# Patient Record
Sex: MALE | Race: WHITE | NOT HISPANIC OR LATINO | Employment: OTHER | URBAN - METROPOLITAN AREA
[De-identification: names, ages, dates, MRNs, and addresses within clinical notes are randomized per-mention and may not be internally consistent; named-entity substitution may affect disease eponyms.]

---

## 2018-01-03 ENCOUNTER — PROCEDURE VISIT CONVERTED (OUTPATIENT)
Dept: PODIATRY | Facility: CLINIC | Age: 58
End: 2018-01-03
Attending: PODIATRIST

## 2018-01-03 ENCOUNTER — CONVERSION ENCOUNTER (OUTPATIENT)
Dept: PODIATRY | Facility: CLINIC | Age: 58
End: 2018-01-03

## 2018-04-04 ENCOUNTER — PROCEDURE VISIT CONVERTED (OUTPATIENT)
Dept: PODIATRY | Facility: CLINIC | Age: 58
End: 2018-04-04
Attending: PODIATRIST

## 2018-04-04 ENCOUNTER — CONVERSION ENCOUNTER (OUTPATIENT)
Dept: PODIATRY | Facility: CLINIC | Age: 58
End: 2018-04-04

## 2018-06-01 ENCOUNTER — CONVERSION ENCOUNTER (OUTPATIENT)
Dept: CARDIOLOGY | Facility: CLINIC | Age: 58
End: 2018-06-01

## 2018-06-01 ENCOUNTER — OFFICE VISIT CONVERTED (OUTPATIENT)
Dept: CARDIOLOGY | Facility: CLINIC | Age: 58
End: 2018-06-01
Attending: INTERNAL MEDICINE

## 2018-06-06 ENCOUNTER — CONVERSION ENCOUNTER (OUTPATIENT)
Dept: CARDIOLOGY | Facility: CLINIC | Age: 58
End: 2018-06-06
Attending: INTERNAL MEDICINE

## 2018-09-13 ENCOUNTER — CONVERSION ENCOUNTER (OUTPATIENT)
Dept: CARDIOLOGY | Facility: CLINIC | Age: 58
End: 2018-09-13

## 2018-09-13 ENCOUNTER — OFFICE VISIT CONVERTED (OUTPATIENT)
Dept: CARDIOLOGY | Facility: CLINIC | Age: 58
End: 2018-09-13
Attending: INTERNAL MEDICINE

## 2019-01-11 ENCOUNTER — HOSPITAL ENCOUNTER (OUTPATIENT)
Dept: WOUND CARE | Facility: HOSPITAL | Age: 59
Setting detail: RECURRING SERIES
Discharge: HOME | End: 2019-01-31
Attending: INTERNAL MEDICINE

## 2019-01-18 ENCOUNTER — OFFICE VISIT CONVERTED (OUTPATIENT)
Dept: SURGERY | Facility: CLINIC | Age: 59
End: 2019-01-18
Attending: SURGERY

## 2019-02-08 ENCOUNTER — HOSPITAL ENCOUNTER (OUTPATIENT)
Dept: WOUND CARE | Facility: HOSPITAL | Age: 59
Setting detail: RECURRING SERIES
Discharge: HOME | End: 2019-02-28
Attending: INTERNAL MEDICINE

## 2019-02-08 ENCOUNTER — OFFICE VISIT CONVERTED (OUTPATIENT)
Dept: SURGERY | Facility: CLINIC | Age: 59
End: 2019-02-08
Attending: SURGERY

## 2019-03-01 ENCOUNTER — HOSPITAL ENCOUNTER (OUTPATIENT)
Dept: WOUND CARE | Facility: HOSPITAL | Age: 59
Setting detail: RECURRING SERIES
Discharge: HOME | End: 2019-03-31
Attending: SURGERY

## 2019-03-11 ENCOUNTER — OFFICE VISIT CONVERTED (OUTPATIENT)
Dept: SURGERY | Facility: CLINIC | Age: 59
End: 2019-03-11
Attending: SURGERY

## 2019-03-11 ENCOUNTER — HOSPITAL ENCOUNTER (OUTPATIENT)
Dept: MRI IMAGING | Facility: HOSPITAL | Age: 59
Discharge: HOME | End: 2019-03-11
Attending: SURGERY

## 2019-03-11 ENCOUNTER — CONVERSION ENCOUNTER (OUTPATIENT)
Dept: SURGERY | Facility: CLINIC | Age: 59
End: 2019-03-11

## 2019-03-11 LAB
CREAT BLD-MCNC: 0.6 MG/DL (ref 0.6–1.4)
GFR SERPLBLD BASED ON 1.73 SQ M-ARVRAT: >60 ML/MIN/{1.73_M2}

## 2019-03-19 ENCOUNTER — HOSPITAL ENCOUNTER (OUTPATIENT)
Dept: OTHER | Facility: HOSPITAL | Age: 59
Discharge: HOME | End: 2019-03-19
Attending: INTERNAL MEDICINE

## 2019-03-25 ENCOUNTER — OFFICE VISIT CONVERTED (OUTPATIENT)
Dept: SURGERY | Facility: CLINIC | Age: 59
End: 2019-03-25
Attending: SURGERY

## 2019-04-11 ENCOUNTER — HOSPITAL ENCOUNTER (OUTPATIENT)
Dept: WOUND CARE | Facility: HOSPITAL | Age: 59
Setting detail: RECURRING SERIES
Discharge: HOME | End: 2019-04-30
Attending: SURGERY

## 2019-04-18 ENCOUNTER — HOSPITAL ENCOUNTER (OUTPATIENT)
Dept: PERIOP | Facility: HOSPITAL | Age: 59
Setting detail: HOSPITAL OUTPATIENT SURGERY
Discharge: HOME | End: 2019-04-18
Attending: SURGERY

## 2019-04-18 LAB
ANION GAP SERPL CALC-SCNC: 15 MMOL/L (ref 8–19)
BUN SERPL-MCNC: 10 MG/DL (ref 5–25)
BUN/CREAT SERPL: 14 {RATIO} (ref 6–20)
CALCIUM SERPL-MCNC: 8.7 MG/DL (ref 8.7–10.4)
CHLORIDE SERPL-SCNC: 102 MMOL/L (ref 99–111)
CONV CO2: 27 MMOL/L (ref 22–32)
CREAT UR-MCNC: 0.69 MG/DL (ref 0.7–1.2)
GFR SERPLBLD BASED ON 1.73 SQ M-ARVRAT: >60 ML/MIN/{1.73_M2}
GLUCOSE BLD-MCNC: 119 MG/DL (ref 70–99)
GLUCOSE SERPL-MCNC: 126 MG/DL (ref 70–99)
OSMOLALITY SERPL CALC.SUM OF ELEC: 291 MOSM/KG (ref 273–304)
POTASSIUM SERPL-SCNC: 4.2 MMOL/L (ref 3.5–5.3)
SODIUM SERPL-SCNC: 140 MMOL/L (ref 135–147)

## 2019-04-19 ENCOUNTER — HOSPITAL ENCOUNTER (OUTPATIENT)
Dept: INFUSION THERAPY | Facility: HOSPITAL | Age: 59
Setting detail: RECURRING SERIES
Discharge: HOME | End: 2019-04-30
Attending: SURGERY

## 2019-04-20 LAB
BACTERIA SPEC AEROBE CULT: NORMAL
BACTERIA SPEC ANAEROBE CULT: NORMAL

## 2019-04-30 ENCOUNTER — OFFICE VISIT CONVERTED (OUTPATIENT)
Dept: SURGERY | Facility: CLINIC | Age: 59
End: 2019-04-30
Attending: SURGERY

## 2019-05-01 ENCOUNTER — HOSPITAL ENCOUNTER (OUTPATIENT)
Dept: INFUSION THERAPY | Facility: HOSPITAL | Age: 59
Setting detail: RECURRING SERIES
Discharge: HOME | End: 2019-05-31
Attending: SURGERY

## 2019-05-02 ENCOUNTER — HOSPITAL ENCOUNTER (OUTPATIENT)
Dept: WOUND CARE | Facility: HOSPITAL | Age: 59
Setting detail: RECURRING SERIES
Discharge: HOME | End: 2019-05-31
Attending: SURGERY

## 2019-05-14 ENCOUNTER — OFFICE VISIT CONVERTED (OUTPATIENT)
Dept: SURGERY | Facility: CLINIC | Age: 59
End: 2019-05-14
Attending: SURGERY

## 2019-06-03 ENCOUNTER — HOSPITAL ENCOUNTER (OUTPATIENT)
Dept: INFUSION THERAPY | Facility: HOSPITAL | Age: 59
Setting detail: RECURRING SERIES
Discharge: HOME | End: 2019-06-30
Attending: SURGERY

## 2019-06-20 ENCOUNTER — HOSPITAL ENCOUNTER (OUTPATIENT)
Dept: WOUND CARE | Facility: HOSPITAL | Age: 59
Setting detail: RECURRING SERIES
Discharge: HOME | End: 2019-06-30
Attending: SURGERY

## 2019-06-21 ENCOUNTER — OFFICE VISIT CONVERTED (OUTPATIENT)
Dept: CARDIOLOGY | Facility: CLINIC | Age: 59
End: 2019-06-21
Attending: INTERNAL MEDICINE

## 2019-07-10 ENCOUNTER — HOSPITAL ENCOUNTER (OUTPATIENT)
Dept: INFUSION THERAPY | Facility: HOSPITAL | Age: 59
Setting detail: RECURRING SERIES
Discharge: HOME | End: 2019-07-31
Attending: SURGERY

## 2019-07-18 ENCOUNTER — HOSPITAL ENCOUNTER (OUTPATIENT)
Dept: WOUND CARE | Facility: HOSPITAL | Age: 59
Setting detail: RECURRING SERIES
Discharge: HOME | End: 2019-07-31
Attending: SURGERY

## 2019-08-08 ENCOUNTER — HOSPITAL ENCOUNTER (OUTPATIENT)
Dept: WOUND CARE | Facility: HOSPITAL | Age: 59
Setting detail: RECURRING SERIES
Discharge: HOME | End: 2019-08-31
Attending: SURGERY

## 2019-09-10 ENCOUNTER — HOSPITAL ENCOUNTER (OUTPATIENT)
Dept: WOUND CARE | Facility: HOSPITAL | Age: 59
Setting detail: RECURRING SERIES
Discharge: HOME | End: 2019-09-30
Attending: SURGERY

## 2019-10-10 ENCOUNTER — HOSPITAL ENCOUNTER (OUTPATIENT)
Dept: WOUND CARE | Facility: HOSPITAL | Age: 59
Setting detail: RECURRING SERIES
Discharge: HOME | End: 2019-10-31
Attending: SURGERY

## 2019-11-15 ENCOUNTER — HOSPITAL ENCOUNTER (OUTPATIENT)
Dept: WOUND CARE | Facility: HOSPITAL | Age: 59
Setting detail: RECURRING SERIES
Discharge: HOME | End: 2019-11-30
Attending: INTERNAL MEDICINE

## 2020-03-10 ENCOUNTER — OFFICE VISIT CONVERTED (OUTPATIENT)
Dept: SURGERY | Facility: CLINIC | Age: 60
End: 2020-03-10
Attending: SURGERY

## 2020-05-07 ENCOUNTER — TELEMEDICINE CONVERTED (OUTPATIENT)
Dept: CARDIOLOGY | Facility: CLINIC | Age: 60
End: 2020-05-07
Attending: INTERNAL MEDICINE

## 2020-10-22 ENCOUNTER — HOSPITAL ENCOUNTER (OUTPATIENT)
Dept: WOUND CARE | Facility: HOSPITAL | Age: 60
Setting detail: RECURRING SERIES
Discharge: STILL ADMITTED OR ADMINISTRATIVE | End: 2021-01-20
Attending: SURGERY

## 2020-11-05 ENCOUNTER — HOSPITAL ENCOUNTER (OUTPATIENT)
Dept: OTHER | Facility: HOSPITAL | Age: 60
Discharge: HOME | End: 2020-11-05
Attending: SURGERY

## 2021-03-12 LAB
BACTERIA SPEC AEROBE CULT: ABNORMAL
CEFAZOLIN SUSC ISLT: 32
CEFTAZIDIME SUSC ISLT: 4
CIPROFLOXACIN SUSC ISLT: 1
GENTAMICIN SUSC ISLT: 2
LEVOFLOXACIN SUSC ISLT: 1
PIP+TAZO SUSC ISLT: <=4
TMP SMX SUSC ISLT: <=20
TOBRAMYCIN SUSC ISLT: <=1

## 2021-04-08 ENCOUNTER — HOSPITAL ENCOUNTER (OUTPATIENT)
Dept: GENERAL RADIOLOGY | Facility: HOSPITAL | Age: 61
Discharge: HOME | End: 2021-04-08
Attending: SURGERY

## 2021-04-09 LAB
BACTERIA SPEC AEROBE CULT: ABNORMAL
CEFAZOLIN SUSC ISLT: 32
CEFTAZIDIME SUSC ISLT: 4
CIPROFLOXACIN SUSC ISLT: >=4
GENTAMICIN SUSC ISLT: 4
LEVOFLOXACIN SUSC ISLT: >=8
PIP+TAZO SUSC ISLT: <=4
TMP SMX SUSC ISLT: <=20
TOBRAMYCIN SUSC ISLT: 2

## 2021-04-22 ENCOUNTER — HOSPITAL ENCOUNTER (OUTPATIENT)
Dept: WOUND CARE | Facility: HOSPITAL | Age: 61
Setting detail: RECURRING SERIES
Discharge: HOME | End: 2021-04-22
Attending: SURGERY

## 2021-05-13 NOTE — PROGRESS NOTES
Progress Note      Patient Name: Jose Shaikh   Patient ID: [id number]   Sex: Male   YOB: 1960    Primary Care Provider: Corazon Gr MD   Referring Provider: Corazon rG MD    Visit Date: May 7, 2020    Provider: Jose Duval MD   Location: [facility]   Location Address: [address] [place] [address], [facility]   [place]  [id number]   Location Phone: [phone]          Chief Complaint  · Atrial fibrillation       History Of Present Illness  Video Conferencing Visit  Jose Shaikh is a 59 year old /White gentleman who is presenting for evaluation via video conferencing. Verbal consent obtained before beginning visit. Telehealth due to COVID-19. He has chronic atrial fibrillation, hypertension and diabetes. He has been doing well. He has stable shortness of breath and chronic lower-extremity edema. No other new complaints or problems.   The following staff were present during this visit: Provider only.   PAST MEDICAL HISTORY: Positive for chronic atrial fibrillation, hypertension and diabetes.   FAMILY HISTORY: Unknown.   PSYCHOSOCIAL HISTORY: Unknown.   CURRENT MEDICATIONS: include Eliquis 5 mg b.i.d.; Metformin 1000 mg b.i.d.; Metoprolol 50 mg daily; Lisinopril 20 mg daily; Simvastatin 20 mg daily; Alfuzosin 10 mg daily; Bupropion 300 mg daily. The dosage and frequency of the medications were reviewed with the patient.       Review of Systems  · Cardiovascular  o Admits  o : swelling (feet, ankles, hands), shortness of breath while walking or lying flat  o Denies  o : palpitations (fast, fluttering, or skipping beats), chest pain or angina pectoris   · Respiratory  o Denies  o : chronic or frequent cough, asthma or wheezing      Vitals     Vitals not available.           Assessment     ASSESSMENT AND PLAN:    1.  Chronic atrial fibrillation:  Symptom-wise controlled.  He is on Eliquis for CVA prevention.  2.  Hypertension:  He is tolerating his medications  well.  3.  Dyslipidemia.    MD Isauro Jeter/viv         This note was transcribed by Yudelka Fang.  viv/isauro   The above service was transcribed by Yudelka Fang, and I attest to the accuracy of the note.  ISAURO.               Electronically Signed by: Yudelka Fang-, -Author on May 14, 2020 04:51:36 AM  Electronically Co-signed by: Jose Duval MD -Reviewer on May 20, 2020 03:54:37 PM

## 2021-05-15 VITALS — BODY MASS INDEX: 40.43 KG/M2 | HEIGHT: 74 IN | WEIGHT: 315 LBS | RESPIRATION RATE: 16 BRPM

## 2021-05-15 VITALS — WEIGHT: 315 LBS | HEIGHT: 73 IN | BODY MASS INDEX: 41.75 KG/M2 | RESPIRATION RATE: 16 BRPM

## 2021-05-15 VITALS
WEIGHT: 315 LBS | HEIGHT: 74 IN | DIASTOLIC BLOOD PRESSURE: 70 MMHG | HEART RATE: 74 BPM | BODY MASS INDEX: 40.43 KG/M2 | SYSTOLIC BLOOD PRESSURE: 110 MMHG

## 2021-05-15 VITALS — BODY MASS INDEX: 40.43 KG/M2 | WEIGHT: 315 LBS | RESPIRATION RATE: 16 BRPM | HEIGHT: 74 IN

## 2021-05-15 VITALS — BODY MASS INDEX: 41.75 KG/M2 | WEIGHT: 315 LBS | RESPIRATION RATE: 14 BRPM | HEIGHT: 73 IN

## 2021-05-15 VITALS — BODY MASS INDEX: 40.43 KG/M2 | RESPIRATION RATE: 16 BRPM | HEIGHT: 74 IN | WEIGHT: 315 LBS

## 2021-05-15 VITALS — WEIGHT: 315 LBS | HEIGHT: 76 IN | RESPIRATION RATE: 14 BRPM | BODY MASS INDEX: 38.36 KG/M2

## 2021-05-15 VITALS — RESPIRATION RATE: 18 BRPM | HEIGHT: 73 IN | BODY MASS INDEX: 41.75 KG/M2 | WEIGHT: 315 LBS

## 2021-05-16 VITALS — WEIGHT: 315 LBS | HEART RATE: 121 BPM | HEIGHT: 74 IN | BODY MASS INDEX: 40.43 KG/M2 | OXYGEN SATURATION: 97 %

## 2021-05-16 VITALS — WEIGHT: 315 LBS | HEART RATE: 145 BPM | OXYGEN SATURATION: 96 % | HEIGHT: 74 IN | BODY MASS INDEX: 40.43 KG/M2

## 2021-05-16 VITALS
SYSTOLIC BLOOD PRESSURE: 100 MMHG | HEIGHT: 74 IN | HEART RATE: 76 BPM | DIASTOLIC BLOOD PRESSURE: 62 MMHG | BODY MASS INDEX: 40.43 KG/M2 | WEIGHT: 315 LBS

## 2021-05-16 VITALS
SYSTOLIC BLOOD PRESSURE: 110 MMHG | BODY MASS INDEX: 40.43 KG/M2 | HEART RATE: 94 BPM | WEIGHT: 315 LBS | DIASTOLIC BLOOD PRESSURE: 86 MMHG | HEIGHT: 74 IN

## 2021-06-21 RX ORDER — SIMVASTATIN 20 MG
20 TABLET ORAL NIGHTLY
COMMUNITY
End: 2023-01-27 | Stop reason: SDUPTHER

## 2021-06-21 RX ORDER — LISINOPRIL 20 MG/1
20 TABLET ORAL DAILY
COMMUNITY
End: 2023-01-27 | Stop reason: SDUPTHER

## 2021-06-21 RX ORDER — HUMAN INSULIN 100 [USP'U]/ML
50 INJECTION, SUSPENSION SUBCUTANEOUS 2 TIMES DAILY
COMMUNITY
End: 2021-09-23

## 2021-06-21 RX ORDER — BUPROPION HYDROCHLORIDE 300 MG/1
300 TABLET ORAL
COMMUNITY
End: 2022-11-08

## 2021-06-21 RX ORDER — SERTRALINE HYDROCHLORIDE 100 MG/1
100 TABLET, FILM COATED ORAL DAILY
COMMUNITY
End: 2022-11-08

## 2021-06-22 ENCOUNTER — OFFICE VISIT (OUTPATIENT)
Dept: WOUND CARE | Facility: HOSPITAL | Age: 61
End: 2021-06-22

## 2021-06-22 VITALS
TEMPERATURE: 86.2 F | DIASTOLIC BLOOD PRESSURE: 70 MMHG | SYSTOLIC BLOOD PRESSURE: 118 MMHG | RESPIRATION RATE: 20 BRPM | HEART RATE: 77 BPM | WEIGHT: 315 LBS | BODY MASS INDEX: 40.43 KG/M2 | HEIGHT: 74 IN

## 2021-06-22 DIAGNOSIS — E11.621 DIABETIC ULCER OF LEFT HEEL ASSOCIATED WITH TYPE 2 DIABETES MELLITUS, WITH FAT LAYER EXPOSED (HCC): Primary | ICD-10-CM

## 2021-06-22 DIAGNOSIS — E11.621 DIABETIC ULCER OF OTHER PART OF RIGHT FOOT ASSOCIATED WITH TYPE 2 DIABETES MELLITUS, LIMITED TO BREAKDOWN OF SKIN (HCC): ICD-10-CM

## 2021-06-22 DIAGNOSIS — L97.422 DIABETIC ULCER OF LEFT HEEL ASSOCIATED WITH TYPE 2 DIABETES MELLITUS, WITH FAT LAYER EXPOSED (HCC): Primary | ICD-10-CM

## 2021-06-22 DIAGNOSIS — L97.511 DIABETIC ULCER OF OTHER PART OF RIGHT FOOT ASSOCIATED WITH TYPE 2 DIABETES MELLITUS, LIMITED TO BREAKDOWN OF SKIN (HCC): ICD-10-CM

## 2021-06-22 PROCEDURE — 87070 CULTURE OTHR SPECIMN AEROBIC: CPT | Performed by: SURGERY

## 2021-06-22 PROCEDURE — 97597 DBRDMT OPN WND 1ST 20 CM/<: CPT | Performed by: SURGERY

## 2021-06-22 PROCEDURE — 87186 SC STD MICRODIL/AGAR DIL: CPT | Performed by: SURGERY

## 2021-06-22 PROCEDURE — G0463 HOSPITAL OUTPT CLINIC VISIT: HCPCS | Performed by: SURGERY

## 2021-06-22 PROCEDURE — 87077 CULTURE AEROBIC IDENTIFY: CPT | Performed by: SURGERY

## 2021-06-22 PROCEDURE — 87205 SMEAR GRAM STAIN: CPT | Performed by: SURGERY

## 2021-06-22 RX ORDER — LEVOFLOXACIN 500 MG/1
500 TABLET, FILM COATED ORAL DAILY
Qty: 10 TABLET | Refills: 0 | Status: SHIPPED | OUTPATIENT
Start: 2021-06-22 | End: 2021-07-02

## 2021-06-22 RX ORDER — CITALOPRAM 10 MG/1
10 TABLET ORAL NIGHTLY
COMMUNITY
Start: 2021-06-11 | End: 2022-10-05 | Stop reason: HOSPADM

## 2021-06-22 RX ORDER — ALFUZOSIN HYDROCHLORIDE 10 MG/1
10 TABLET, EXTENDED RELEASE ORAL DAILY
COMMUNITY
Start: 2021-06-11 | End: 2023-01-27

## 2021-06-22 RX ORDER — METOPROLOL SUCCINATE 50 MG/1
50 TABLET, EXTENDED RELEASE ORAL DAILY
COMMUNITY
Start: 2021-06-11 | End: 2023-01-27 | Stop reason: SDUPTHER

## 2021-06-22 NOTE — SIGNIFICANT NOTE
Epithelial %: 0%    Exposed Bone: no    Exposed Tendon: no    Impression: worsening    Short Term Goals: INCREASE GRANULATION AND DECREASE SIZE

## 2021-06-22 NOTE — SIGNIFICANT NOTE
Epithelial %: 0%    Exposed Bone: no    Exposed Tendon: no    Impression: worsening    Short Term Goals: INCREASE GRANUALTION AND DECREASE SIZE

## 2021-06-22 NOTE — PROGRESS NOTES
"Chief Complaint  Wound Check (dm foot ulcers, bilateral)    Subjective            Objective     Vitals:    06/22/21 1125   BP: 118/70   BP Location: Left arm   Patient Position: Sitting   Pulse: 77   Resp: 20   Temp: (!) 86.2 °F (30.1 °C)   TempSrc: Temporal   Weight: (!) 176 kg (388 lb)   Height: 188 cm (74\")   PainSc:   3   PainLoc: Foot         I have reviewed the HPI and ROS as documented by MA/RN. Narayan Dumont MD    Physical Exam     Wound Description:  Patient continues to have bilateral diabetic ulcers on the bottom of both feet.  The diabetic ulcer on the heel of the left foot has not improved and presently it is appears to be somewhat deeper.  I did culture this wound today and then proceeded with debriding the wound and eliminating the callus around the wound.  I feel that it would be best to discontinue the Aquacel to the wound and start applying wet-to-dry saline soaks to that wound trying to do this at least twice a day.  The diabetic ulcer on the plantar surface of the right foot appears to be slightly improved callus around the wound site was excised the wound's and itself was silver nitrated and I have asked the patient to apply Silvadene to this wound twice a day.  The patient has been counseled that we will proceed to start him on Levaquin 500 mg daily and if the culture returns not consistent with his drug we will call him to apply another drug.    Result Review :   The following data was reviewed by: Narayan Dumont MD on 06/22/2021:      Wound debridement  Performed by: Narayan Dumont MD  Authorized by: Narayan Dumont MD     Correct patient: Identification verified by two methods:    Correct procedure/consent    How many wounds are you performing a debridement on?:  2  Wound 1:     Provider Documentation    Debridement type:  Sharp/excisional    Other:  Alcohol    Other:  10.  Scalpel    Tissue debrided:  Moderate    Type tissue:  Slough    Level removed:  Shallow Full    Patient " tolerance:  Good    Hemostasis achieved by:  Direct pressure and Silver nitrate  Wound 2:       Provider Documentation:     Debridement type:  Sharp/Excisional    Other:  Alcohol    Other:  10.  Scalpel    Tissue debrided:  Moderate    Type tissue:  Slough    Level removed:  Shallow Full    Hemostasis achieved by:  Direct Pressure and Silver Nitrate        Total surface of the skin callus debrided from the wound cannot be determined    Assessment and Plan    Diagnoses and all orders for this visit:    1. Diabetic ulcer of left heel associated with type 2 diabetes mellitus, with fat layer exposed (CMS/HCC) (Primary)  -     Wound Culture - Wound, Foot, Left  -     Wound debridement  -     levoFLOXacin (Levaquin) 500 MG tablet; Take 1 tablet by mouth Daily for 10 days.  Dispense: 10 tablet; Refill: 0    2. Diabetic ulcer of other part of right foot associated with type 2 diabetes mellitus, limited to breakdown of skin (CMS/HCC)  -     Wound debridement  -     levoFLOXacin (Levaquin) 500 MG tablet; Take 1 tablet by mouth Daily for 10 days.  Dispense: 10 tablet; Refill: 0            Follow Up   Return in about 2 weeks (around 7/6/2021).  Patient was given instructions and counseling regarding his condition or for health maintenance advice. Please see specific information pulled into the AVS if appropriate.

## 2021-06-23 ENCOUNTER — TELEPHONE (OUTPATIENT)
Dept: UROLOGY | Facility: CLINIC | Age: 61
End: 2021-06-23

## 2021-06-23 NOTE — TELEPHONE ENCOUNTER
----- Message from Anu Kilgore sent at 6/23/2021 11:23 AM EDT -----  Pt has appt on Monday with heather for BPH and incontinence. Can you move his appt out or move him with sarah

## 2021-06-25 LAB
BACTERIA SPEC AEROBE CULT: ABNORMAL
BACTERIA SPEC AEROBE CULT: ABNORMAL
GRAM STN SPEC: ABNORMAL

## 2021-07-06 ENCOUNTER — OFFICE VISIT (OUTPATIENT)
Dept: WOUND CARE | Facility: HOSPITAL | Age: 61
End: 2021-07-06

## 2021-07-06 VITALS
OXYGEN SATURATION: 96 % | TEMPERATURE: 96.9 F | DIASTOLIC BLOOD PRESSURE: 70 MMHG | RESPIRATION RATE: 18 BRPM | HEART RATE: 85 BPM | SYSTOLIC BLOOD PRESSURE: 102 MMHG

## 2021-07-06 DIAGNOSIS — E11.621 DIABETIC ULCER OF OTHER PART OF RIGHT FOOT ASSOCIATED WITH TYPE 2 DIABETES MELLITUS, LIMITED TO BREAKDOWN OF SKIN (HCC): ICD-10-CM

## 2021-07-06 DIAGNOSIS — E11.621 DIABETIC ULCER OF LEFT HEEL ASSOCIATED WITH TYPE 2 DIABETES MELLITUS, WITH FAT LAYER EXPOSED (HCC): Primary | ICD-10-CM

## 2021-07-06 DIAGNOSIS — L97.422 DIABETIC ULCER OF LEFT HEEL ASSOCIATED WITH TYPE 2 DIABETES MELLITUS, WITH FAT LAYER EXPOSED (HCC): Primary | ICD-10-CM

## 2021-07-06 DIAGNOSIS — L97.511 DIABETIC ULCER OF OTHER PART OF RIGHT FOOT ASSOCIATED WITH TYPE 2 DIABETES MELLITUS, LIMITED TO BREAKDOWN OF SKIN (HCC): ICD-10-CM

## 2021-07-06 PROCEDURE — 99213 OFFICE O/P EST LOW 20 MIN: CPT | Performed by: SURGERY

## 2021-07-06 RX ORDER — GENTAMICIN SULFATE 1 MG/G
OINTMENT TOPICAL 3 TIMES DAILY
Qty: 30 G | Refills: 3 | Status: SHIPPED | OUTPATIENT
Start: 2021-07-06 | End: 2021-09-23

## 2021-07-06 NOTE — PROGRESS NOTES
Chief Complaint  Wound Check (bilateral foot, dm)    Subjective            Objective     Vitals:    07/06/21 1346   BP: 102/70   BP Location: Left arm   Patient Position: Sitting   Pulse: 85   Resp: 18   Temp: 96.9 °F (36.1 °C)   TempSrc: Temporal   SpO2: 96%   PainSc: 10-Worst pain ever  Comment: when walking   PainLoc: Foot  Comment: left         I have reviewed the HPI and ROS as documented by MA/RN. Narayan Dumont MD    Physical Exam     Wound Description:  Patient returns with the diabetic ulcers on the plantar surfaces of both feet.  The ulcer of the left foot is on the heel plantar aspect of the foot.  This ulcer has improved since the last visit.  The wound site is smaller and there is less callus tissue associated with the wound.  Today I did excise the callus around the wound site and I have asked the patient to continue to use wet-to-dry soaks to that wound and also every other day to apply gentamicin to the wound site.  The ulcer on the plantar surface of the right foot is smaller the callus around the wound site was excised today and have asked the patient to continue to use gentamicin ointment on that wound also.  I would like to see this patient back again in the clinic in 3 weeks.    Result Review :   The following data was reviewed by: Narayan Dumont MD on 07/06/2021:      Wound debridement  Performed by: Narayan Dumont MD  Authorized by: Narayan Dumont MD     Correct patient: Identification verified by two methods:     Verbally and Date of birth  How many wounds are you performing a debridement on?:  2  Wound 1:     Provider Documentation    Debridement type:  Sharp/excisional    Other:  Alcohol    Other:  10.  Scalpel     None    Tissue debrided:  Moderate    Level removed:  Shallow Full    Hemostasis achieved by:  Direct pressure  Wound 2:     Wound Location:  Right foot      Provider Documentation:     Debridement type:  Sharp/Excisional    Other:  Alcohol    Other:  10 scalpel      None    Tissue debrided:  Small    Type tissue:  Slough    Level removed:  Shallow Full    Patient tolerance:  Good    Hemostasis achieved by:  Direct Pressure    Total surface area of the skin callus removed from the right foot was 2 cm².  The surface area total surface area of the tissue which was skin with callus removed from the left foot was 2.8 cm²       Assessment and Plan    Diagnoses and all orders for this visit:    1. Diabetic ulcer of left heel associated with type 2 diabetes mellitus, with fat layer exposed (CMS/HCC) (Primary)    2. Diabetic ulcer of other part of right foot associated with type 2 diabetes mellitus, limited to breakdown of skin (CMS/HCC)    Other orders  -     Wound debridement            Follow Up   Return in about 3 weeks (around 7/27/2021).  Patient was given instructions and counseling regarding his condition or for health maintenance advice. Please see specific information pulled into the AVS if appropriate.

## 2021-07-06 NOTE — SIGNIFICANT NOTE
Epithelial %: 0%    Exposed Bone: no    Exposed Tendon: no    Impression: unchanged    Short Term Goals: INCREASE GRANULATION, DECREASE SIZE

## 2021-07-27 ENCOUNTER — OFFICE VISIT (OUTPATIENT)
Dept: WOUND CARE | Facility: HOSPITAL | Age: 61
End: 2021-07-27

## 2021-07-27 VITALS
RESPIRATION RATE: 18 BRPM | DIASTOLIC BLOOD PRESSURE: 74 MMHG | SYSTOLIC BLOOD PRESSURE: 110 MMHG | OXYGEN SATURATION: 95 % | TEMPERATURE: 96.1 F | HEART RATE: 75 BPM

## 2021-07-27 DIAGNOSIS — L97.422 DIABETIC ULCER OF LEFT HEEL ASSOCIATED WITH TYPE 2 DIABETES MELLITUS, WITH FAT LAYER EXPOSED (HCC): Primary | ICD-10-CM

## 2021-07-27 DIAGNOSIS — E11.621 DIABETIC ULCER OF LEFT HEEL ASSOCIATED WITH TYPE 2 DIABETES MELLITUS, WITH FAT LAYER EXPOSED (HCC): Primary | ICD-10-CM

## 2021-07-27 DIAGNOSIS — E11.621 DIABETIC ULCER OF OTHER PART OF RIGHT FOOT ASSOCIATED WITH TYPE 2 DIABETES MELLITUS, LIMITED TO BREAKDOWN OF SKIN (HCC): ICD-10-CM

## 2021-07-27 DIAGNOSIS — L97.511 DIABETIC ULCER OF OTHER PART OF RIGHT FOOT ASSOCIATED WITH TYPE 2 DIABETES MELLITUS, LIMITED TO BREAKDOWN OF SKIN (HCC): ICD-10-CM

## 2021-07-27 PROCEDURE — 99215 OFFICE O/P EST HI 40 MIN: CPT | Performed by: EMERGENCY MEDICINE

## 2021-07-27 PROCEDURE — 97597 DBRDMT OPN WND 1ST 20 CM/<: CPT | Performed by: EMERGENCY MEDICINE

## 2021-07-27 PROCEDURE — 11043 DBRDMT MUSC&/FSCA 1ST 20/<: CPT | Performed by: EMERGENCY MEDICINE

## 2021-07-27 PROCEDURE — 87205 SMEAR GRAM STAIN: CPT | Performed by: EMERGENCY MEDICINE

## 2021-07-27 PROCEDURE — 87070 CULTURE OTHR SPECIMN AEROBIC: CPT | Performed by: EMERGENCY MEDICINE

## 2021-07-27 PROCEDURE — 87077 CULTURE AEROBIC IDENTIFY: CPT | Performed by: EMERGENCY MEDICINE

## 2021-07-27 PROCEDURE — 87186 SC STD MICRODIL/AGAR DIL: CPT | Performed by: EMERGENCY MEDICINE

## 2021-07-27 PROCEDURE — 87147 CULTURE TYPE IMMUNOLOGIC: CPT | Performed by: EMERGENCY MEDICINE

## 2021-07-27 NOTE — PROGRESS NOTES
Chief Complaint  Wound Check (BILATERAL FOOT ULCERS, DM )    Subjective        History of Present Illness    Patient is a 60-year-old type II diabetic with neuropathy and insulin dependence.  He has been followed in the wound care clinic for nonhealing ulcers on his left heel and right distal midfoot.  A culture from June showed Moderate Growth 3+ Alcaligenes faecalis ssp faecalis.  Apparently the patient did not want to have IV antibiotics so he was instructed to apply topical gentamicin ointment and continue dressing changes.  He has not noticed an odor, he says sometimes there seems to be increased drainage but he is not really sure.  He denies any fevers, chills, or other constitutional symptoms.  He still is reticent to consider IV antibiotics but would be willing to take oral antibiotics.  He says if he really has to he would try and do the IV medications.    Allergies:  Adhesive tape      Current Outpatient Medications:   •  alfuzosin (UROXATRAL) 10 MG 24 hr tablet, Take 10 mg by mouth Daily., Disp: , Rfl:   •  apixaban (Eliquis) 5 MG tablet tablet, Take 10 mg by mouth 2 (two) times a day., Disp: , Rfl:   •  buPROPion XL (WELLBUTRIN XL) 300 MG 24 hr tablet, Take 300 mg by mouth Daily., Disp: , Rfl:   •  citalopram (CeleXA) 10 MG tablet, TAKE (1) TABLET BY MOUTH 1 TIME PER DAY AT BEDTIME, Disp: , Rfl:   •  gentamicin (GARAMYCIN) 0.1 % ointment, Apply  topically to the appropriate area as directed 3 (Three) Times a Day., Disp: 30 g, Rfl: 3  •  insulin NPH-insulin regular (NovoLIN 70/30) (70-30) 100 UNIT/ML injection, Inject 10 Units under the skin into the appropriate area as directed 2 (two) times a day., Disp: , Rfl:   •  lisinopril (PRINIVIL,ZESTRIL) 20 MG tablet, Take 20 mg by mouth Daily., Disp: , Rfl:   •  metFORMIN (GLUCOPHAGE) 1000 MG tablet, Take 2,000 mg by mouth 2 (two) times a day., Disp: , Rfl:   •  metoprolol succinate XL (TOPROL-XL) 50 MG 24 hr tablet, Take 50 mg by mouth Daily., Disp: , Rfl:   •   sertraline (ZOLOFT) 100 MG tablet, Take 100 mg by mouth Daily., Disp: , Rfl:   •  simvastatin (Zocor) 20 MG tablet, Take 20 mg by mouth Every Night., Disp: , Rfl:     Past Medical History:   Diagnosis Date   • Abscess    • Absence of toe of right foot (CMS/HCC)    • Anxiety and depression    • Arthritis    • Atrial fibrillation (CMS/HCC)    • Claustrophobia    • Corns and callus    • Difficulty walking    • Fracture    • Hammertoe    • Heel pain    • HTN (hypertension)    • Ingrown toenail    • Left foot pain    • Limb swelling    • Obesity    • Polyneuropathy    • Pressure ulcer, stage 1    • Right foot pain    • Shortness of breath    • Tinea unguium    • Type 2 diabetes mellitus with polyneuropathy (CMS/HCC)      Past Surgical History:   Procedure Laterality Date   • CYST REMOVAL     • INCISION AND DRAINAGE ABSCESS     • OTHER SURGICAL HISTORY      Surgical clips   • TOE SURGERY      Removal   • WRIST SURGERY       Social History     Socioeconomic History   • Marital status: Single     Spouse name: Not on file   • Number of children: Not on file   • Years of education: Not on file   • Highest education level: Not on file   Tobacco Use   • Smoking status: Former Smoker     Packs/day: 20.00   • Smokeless tobacco: Former User   Substance and Sexual Activity   • Alcohol use: Yes     Comment: Rare   • Drug use: Not Currently     Family History   Problem Relation Age of Onset   • Heart disease Mother    • Heart disease Father    • Cancer Father         Unspecified   • Heart disease Brother          Objective     Vitals:    07/27/21 1303   BP: 110/74   BP Location: Left arm   Patient Position: Sitting   Pulse: 75   Resp: 18   Temp: 96.1 °F (35.6 °C)   TempSrc: Temporal   SpO2: 95%   PainSc: 10-Worst pain ever   PainLoc: Foot  Comment: LEFT FOOT, CONSTANT     There is no height or weight on file to calculate BMI.    GULSHAN Fall Risk Assessment has not been completed.     Review of Systems     ROS:  No fevers, chills,  sweats, or body aches. No shortness of breath.     I have reviewed the HPI and ROS as documented by MA/RN. Shea Daniels MD    Physical Exam     NAD, well dressed, well nourished  Normocephalic, atraumatic  EOMI, no scleral icterus  No respiratory distress, no cough  AAOx3, pleasant, cooperative  RRR, dopplerable DP and PT pulses BLE, no pitting edema  Significant callus to BLE plantar foot ulcers.  The LLE heel wound appears worse compared with prior images and there is an odor and some clear drainage as well as some macerated and necrotic tissue noted.  RLE plantar wound with callus buildup, mostly clean wound base but small area of friable necrotic tissue at the distalmost aspect. See photos below for details.                        Result Review :  The following data was reviewed by: Shea Daniels MD on 07/27/2021:    Wound Culture 06/22/2021 Moderate Growth 3+ Alcaligenes faecalis ssp faecalis, office visit [facility] 07/06/2021, prior labs, prior imaging (last MRI 2019)    Wound debridement  Performed by: Shea Daniels MD  Authorized by: Shea Daniels MD     Correct patient: Identification verified by two methods:     Verbally and Date of birth  Correct procedure/consent    Correct site/side    Correct equipment as applicable    How many wounds are you performing a debridement on?:  2  Wound 1:     Provider Documentation    Debridement type:  Sharp/excisional    Betadine      Debridement Kit/Tray      Other:  10 blade scalpel     None    Tissue debrided:  Moderate    Type tissue:  Culture sent, Slough, Eschar and Other    Other tissue type:  Callus    Level removed:  Subcutaneous and Muscle    Patient tolerance:  Good    Hemostasis achieved by:  Direct pressure and Other    Other methods:  Minimal    Wound Bed Post Debridement: See Photo      Description:  LEFT foot, necrotic tissue, slough, callus removed, some tracking noted medially (? Probe to bone? Unable to fully visualize due to location and depth of  this area, suspect at least very close to bone at base of wound), visible muscle, dead tissue debrided, odor and serous discharge noted. Culture obtained. Debridement area 2.5 x 4cm  Wound 2:       Provider Documentation:     Debridement type:  Sharp/Excisional    Betadine      Debridement Kit/Tray      Other:  10 blade scalpel     None    Tissue debrided:  Small    Type tissue:  Culture sent    Level removed:  Shallow Full    Patient tolerance:  Good    Hemostasis achieved by:  Other    Other methods:  None    Wound Bed Post Debridement: See Photo      Description:  RIGHT foot callus only removed, some clear drainage and maceration noted, no subcutaneous or deeper tissues debrided, debridement area 1.5 x 1 cm       Post debridement photos:                     Assessment and Plan   Diagnoses and all orders for this visit:    1. Diabetic ulcer of left heel associated with type 2 diabetes mellitus, with fat layer exposed (CMS/HCC) (Primary)  -     Wound Culture - Wound, Foot, Left  -     MRI Foot Left With Contrast; Future  -     CBC & Differential  -     Basic Metabolic Panel  -     Sedimentation Rate  -     C-reactive Protein; Future  -     MRI Foot Left Without Contrast; Future    2. Diabetic ulcer of other part of right foot associated with type 2 diabetes mellitus, limited to breakdown of skin (CMS/HCC)  -     Wound Culture - Wound, Foot, Right    Other orders  -     Cancel: Wound Culture - Wound, Foot, Right  -     Wound debridement      We attempted to contact the clinical pharmacist to determine appropriate treatment and significance of the positive culture.  Both wounds were recultured today as he has not had any antibiotics for this and there appears to be some drainage from both wounds and the LLE wound has an odor as well as necrotic tissue deep in the wound and along the lateral aspect.  This was all debrided down to healthy tissue.  We were unable to reach the pharmacist but will contact them again  tomorrow and I will also reach out to infectious disease if possible.  MRI of the left foot with and without contrast has been ordered as well as labs to ensure that the patient will be able to undergo contrast safely. Aquacel Ag and dressings applied.    Patient was given instructions and counseling regarding their condition or for health maintenance advice, as well as the wound care plan and recommendations. They understand and agree with the plan.  They will follow back up here in the clinic but are instructed to contact us in the interim should they have any new, returning, or worsening symptoms or concerns. Please see specific information pulled into the AVS if appropriate.     I spent 40 minutes in both face to face and nonface-to-face patient care today.  This includes, but is not limited to, H&P, counseling, extensive review of prior records, research on the patient's unusual bacterial growth from his most recent culture, documenting, ordering and reviewing tests.    Follow Up   Return in about 3 weeks (around 8/17/2021) for Recheck.      Shea Daniels MD

## 2021-07-29 ENCOUNTER — TELEPHONE (OUTPATIENT)
Dept: WOUND CARE | Facility: HOSPITAL | Age: 61
End: 2021-07-29

## 2021-07-29 DIAGNOSIS — T14.8XXA WOUND INFECTION: Primary | ICD-10-CM

## 2021-07-29 DIAGNOSIS — L08.9 WOUND INFECTION: Primary | ICD-10-CM

## 2021-07-29 RX ORDER — SULFAMETHOXAZOLE AND TRIMETHOPRIM 800; 160 MG/1; MG/1
1 TABLET ORAL 2 TIMES DAILY
Qty: 20 TABLET | Refills: 0 | Status: SHIPPED | OUTPATIENT
Start: 2021-07-29 | End: 2021-08-08

## 2021-07-29 NOTE — PROGRESS NOTES
Patient's wound culture from 06/22/2021 grew Alcaligenes faecalis. This was also present on a culture done in April. They patient did not want to undergo IV Abx after the result in June and was placed on Gentamicin ointment topically instead. His wound looked worse at his visit with me this week so a new culture was performed which has grown Staph aureus (NOT MRSA). There is also evidence of gram negative bacilli which appear to most likely be an Alcaligenes species again, although final culture results are still pending. This organism is often multi-drug resistant. As such, I spoke with Micro in the lab at [facility] where the culture was performed. They referred me to the pharmacist handling our facilities questions, Aniya. We spoke at length about this patient and she suggested that a 10d course of Bactrim would be likely to address both organisms. Medication called in to patient's pharmacy. We will monitor his progress and wound closely.

## 2021-07-30 ENCOUNTER — DOCUMENTATION (OUTPATIENT)
Dept: WOUND CARE | Facility: HOSPITAL | Age: 61
End: 2021-07-30

## 2021-07-30 ENCOUNTER — TELEPHONE (OUTPATIENT)
Dept: WOUND CARE | Facility: HOSPITAL | Age: 61
End: 2021-07-30

## 2021-07-30 ENCOUNTER — TRANSCRIBE ORDERS (OUTPATIENT)
Dept: WOUND CARE | Facility: HOSPITAL | Age: 61
End: 2021-07-30

## 2021-07-30 DIAGNOSIS — L97.422 DIABETIC ULCER OF LEFT HEEL ASSOCIATED WITH TYPE 2 DIABETES MELLITUS, WITH FAT LAYER EXPOSED (HCC): Primary | ICD-10-CM

## 2021-07-30 DIAGNOSIS — E11.621 DIABETIC ULCER OF LEFT HEEL ASSOCIATED WITH TYPE 2 DIABETES MELLITUS, WITH FAT LAYER EXPOSED (HCC): Primary | ICD-10-CM

## 2021-07-30 LAB
BACTERIA SPEC AEROBE CULT: ABNORMAL
GRAM STN SPEC: ABNORMAL

## 2021-07-30 NOTE — TELEPHONE ENCOUNTER
INFORMED PATIENT OF WOUND CULTURE AND DR. MAYER'S ORDERS FOR BACTRIM, BLOODWORK, AND MRI. TOLD PATIENT I WOULD CALL AND ATTEMPT TO SCHEDULE MRI.

## 2021-08-01 LAB
BACTERIA SPEC AEROBE CULT: ABNORMAL
GRAM STN SPEC: ABNORMAL

## 2021-08-02 ENCOUNTER — TELEPHONE (OUTPATIENT)
Dept: WOUND CARE | Facility: HOSPITAL | Age: 61
End: 2021-08-02

## 2021-08-04 NOTE — TELEPHONE ENCOUNTER
CALLED PATIENT TO OBTAIN NEW INSURANCE INFORMATION TO OBTAIN AUTHORIZATION FOR MRI. PATIENT NOW HAS [employer].

## 2021-08-11 ENCOUNTER — HOSPITAL ENCOUNTER (OUTPATIENT)
Dept: MRI IMAGING | Facility: HOSPITAL | Age: 61
Discharge: HOME | End: 2021-08-11
Admitting: EMERGENCY MEDICINE

## 2021-08-11 DIAGNOSIS — L97.422 DIABETIC ULCER OF LEFT HEEL ASSOCIATED WITH TYPE 2 DIABETES MELLITUS, WITH FAT LAYER EXPOSED (HCC): ICD-10-CM

## 2021-08-11 DIAGNOSIS — E11.621 DIABETIC ULCER OF LEFT HEEL ASSOCIATED WITH TYPE 2 DIABETES MELLITUS, WITH FAT LAYER EXPOSED (HCC): ICD-10-CM

## 2021-08-11 PROCEDURE — 73720 MRI LWR EXTREMITY W/O&W/DYE: CPT

## 2021-08-11 PROCEDURE — 0 GADOBENATE DIMEGLUMINE 529 MG/ML SOLUTION: Performed by: EMERGENCY MEDICINE

## 2021-08-11 PROCEDURE — A9577 INJ MULTIHANCE: HCPCS | Performed by: EMERGENCY MEDICINE

## 2021-08-11 RX ADMIN — GADOBENATE DIMEGLUMINE 20 ML: 529 INJECTION, SOLUTION INTRAVENOUS at 17:25

## 2021-08-12 ENCOUNTER — DOCUMENTATION (OUTPATIENT)
Dept: WOUND CARE | Facility: HOSPITAL | Age: 61
End: 2021-08-12

## 2021-08-12 DIAGNOSIS — L97.424 DIABETIC ULCER OF LEFT HEEL ASSOCIATED WITH TYPE 2 DIABETES MELLITUS, WITH NECROSIS OF BONE (HCC): Primary | ICD-10-CM

## 2021-08-12 DIAGNOSIS — E11.621 DIABETIC ULCER OF LEFT HEEL ASSOCIATED WITH TYPE 2 DIABETES MELLITUS, WITH NECROSIS OF BONE (HCC): Primary | ICD-10-CM

## 2021-08-12 DIAGNOSIS — M86.172 ACUTE OSTEOMYELITIS OF LEFT CALCANEUS (HCC): Primary | ICD-10-CM

## 2021-08-12 DIAGNOSIS — E11.621 DIABETIC ULCER OF LEFT HEEL ASSOCIATED WITH TYPE 2 DIABETES MELLITUS, WITH NECROSIS OF BONE (HCC): ICD-10-CM

## 2021-08-12 DIAGNOSIS — L97.424 DIABETIC ULCER OF LEFT HEEL ASSOCIATED WITH TYPE 2 DIABETES MELLITUS, WITH NECROSIS OF BONE (HCC): ICD-10-CM

## 2021-08-12 NOTE — PROGRESS NOTES
Patient's MRI result reveals osteomyelitis in the left calcaneus which is what we feared, given his deep plantar wound probing to bone. He also has a wound culture positive for MSSA, Alcaligenes faecalis, and strep anginosis. I spoke with Dr. Olivarez, infectious disease with [facility], and he suggests a 4-week course of IV ertapenem 1 g daily. He suggests a CBC, BMP, ESR, and CRP now and in 3 weeks after starting the ertapenem. I have tried to call the patient a couple times today and left him a message to contact us regarding some of his results. We will need to reach him and have him obtain the labs I ordered previously that he never had done, and also the new labs that I have added on. He would be a good candidate for hyperbaric oxygen therapy which apparently he has undergone in the past, a few years ago. Discussed with nursing staff.

## 2021-08-18 ENCOUNTER — TELEPHONE (OUTPATIENT)
Dept: WOUND CARE | Facility: HOSPITAL | Age: 61
End: 2021-08-18

## 2021-08-18 ENCOUNTER — OFFICE VISIT (OUTPATIENT)
Dept: WOUND CARE | Facility: HOSPITAL | Age: 61
End: 2021-08-18

## 2021-08-18 VITALS
HEIGHT: 74 IN | BODY MASS INDEX: 40.43 KG/M2 | RESPIRATION RATE: 18 BRPM | TEMPERATURE: 97.7 F | DIASTOLIC BLOOD PRESSURE: 80 MMHG | SYSTOLIC BLOOD PRESSURE: 120 MMHG | WEIGHT: 315 LBS | HEART RATE: 74 BPM

## 2021-08-18 DIAGNOSIS — E11.621 DIABETIC ULCER OF LEFT HEEL ASSOCIATED WITH TYPE 2 DIABETES MELLITUS, WITH NECROSIS OF BONE (HCC): Primary | ICD-10-CM

## 2021-08-18 DIAGNOSIS — L97.415 DIABETIC ULCER OF RIGHT MIDFOOT ASSOCIATED WITH TYPE 2 DIABETES MELLITUS, WITH MUSCLE INVOLVEMENT WITHOUT EVIDENCE OF NECROSIS (HCC): ICD-10-CM

## 2021-08-18 DIAGNOSIS — L97.424 DIABETIC ULCER OF LEFT HEEL ASSOCIATED WITH TYPE 2 DIABETES MELLITUS, WITH NECROSIS OF BONE (HCC): Primary | ICD-10-CM

## 2021-08-18 DIAGNOSIS — E11.621 DIABETIC ULCER OF RIGHT MIDFOOT ASSOCIATED WITH TYPE 2 DIABETES MELLITUS, WITH MUSCLE INVOLVEMENT WITHOUT EVIDENCE OF NECROSIS (HCC): ICD-10-CM

## 2021-08-18 DIAGNOSIS — M86.172 ACUTE OSTEOMYELITIS OF LEFT CALCANEUS (HCC): ICD-10-CM

## 2021-08-18 PROCEDURE — 11042 DBRDMT SUBQ TIS 1ST 20SQCM/<: CPT | Performed by: EMERGENCY MEDICINE

## 2021-08-18 PROCEDURE — 99213 OFFICE O/P EST LOW 20 MIN: CPT | Performed by: EMERGENCY MEDICINE

## 2021-08-18 PROCEDURE — 11043 DBRDMT MUSC&/FSCA 1ST 20/<: CPT | Performed by: EMERGENCY MEDICINE

## 2021-08-18 NOTE — TELEPHONE ENCOUNTER
CALLED PATIENT TO NOTIFY OF PICC LINE PLACEMENT, SCHEDULED FOR TOMORROW 8/19/21 AT 11 AM AT [facility]. PATIENT VERBALIZES UNDERSTANDING.

## 2021-08-18 NOTE — PROGRESS NOTES
Chief Complaint  Wound Check (diabetic ulcer(s) follow-up)    Subjective        History of Present Illness    Patient is a 60-year-old type II diabetic with neuropathy and insulin dependence.  He has been followed in the wound care clinic for nonhealing ulcers on his left heel and right distal midfoot.  He had positive cultures and finally got his MRI performed which showed osteomyelitis of the left heel.  He already underwent hyperbaric oxygen therapy for this problem a couple years ago so is not eligible to repeat HBO treatment.  However, IV antibiotics were recommended.  I tried to contact the patient by phone and left him a message as did the nurses.  He has just been using Silvadene to his wounds and did not contact us back regarding the test results.  He also has not had his labs done that I had previously recommended.    His wounds are looking worse and continuing to drain.  He has no pain in these areas due to neuropathy.  He is amenable to getting a PICC line and undergoing IV antibiotics now.    Allergies:  Adhesive tape      Current Outpatient Medications:   •  alfuzosin (UROXATRAL) 10 MG 24 hr tablet, Take 10 mg by mouth Daily., Disp: , Rfl:   •  apixaban (Eliquis) 5 MG tablet tablet, Take 10 mg by mouth 2 (two) times a day., Disp: , Rfl:   •  buPROPion XL (WELLBUTRIN XL) 300 MG 24 hr tablet, Take 300 mg by mouth Daily., Disp: , Rfl:   •  citalopram (CeleXA) 10 MG tablet, TAKE (1) TABLET BY MOUTH 1 TIME PER DAY AT BEDTIME, Disp: , Rfl:   •  gentamicin (GARAMYCIN) 0.1 % ointment, Apply  topically to the appropriate area as directed 3 (Three) Times a Day., Disp: 30 g, Rfl: 3  •  insulin NPH-insulin regular (NovoLIN 70/30) (70-30) 100 UNIT/ML injection, Inject 10 Units under the skin into the appropriate area as directed 2 (two) times a day., Disp: , Rfl:   •  lisinopril (PRINIVIL,ZESTRIL) 20 MG tablet, Take 20 mg by mouth Daily., Disp: , Rfl:   •  metFORMIN (GLUCOPHAGE) 1000 MG tablet, Take 2,000 mg by  mouth 2 (two) times a day., Disp: , Rfl:   •  metoprolol succinate XL (TOPROL-XL) 50 MG 24 hr tablet, Take 50 mg by mouth Daily., Disp: , Rfl:   •  sertraline (ZOLOFT) 100 MG tablet, Take 100 mg by mouth Daily., Disp: , Rfl:   •  simvastatin (Zocor) 20 MG tablet, Take 20 mg by mouth Every Night., Disp: , Rfl:   No current facility-administered medications for this visit.    Past Medical History:   Diagnosis Date   • Abscess    • Absence of toe of right foot (CMS/HCC)    • Anxiety and depression    • Arthritis    • Atrial fibrillation (CMS/HCC)    • Claustrophobia    • Corns and callus    • Difficulty walking    • Fracture    • Hammertoe    • Heel pain    • HTN (hypertension)    • Ingrown toenail    • Left foot pain    • Limb swelling    • Obesity    • Polyneuropathy    • Pressure ulcer, stage 1    • Right foot pain    • Shortness of breath    • Tinea unguium    • Type 2 diabetes mellitus with polyneuropathy (CMS/HCC)      Past Surgical History:   Procedure Laterality Date   • CYST REMOVAL     • INCISION AND DRAINAGE ABSCESS     • OTHER SURGICAL HISTORY      Surgical clips   • TOE SURGERY      Removal   • WRIST SURGERY       Social History     Socioeconomic History   • Marital status: Single     Spouse name: Not on file   • Number of children: Not on file   • Years of education: Not on file   • Highest education level: Not on file   Tobacco Use   • Smoking status: Former Smoker     Packs/day: 20.00   • Smokeless tobacco: Former User   Substance and Sexual Activity   • Alcohol use: Yes     Comment: Rare   • Drug use: Not Currently     Family History   Problem Relation Age of Onset   • Heart disease Mother    • Heart disease Father    • Cancer Father         Unspecified   • Heart disease Brother          Objective     Vitals:    08/18/21 1305   BP: 120/80   BP Location: Left arm   Patient Position: Sitting   Pulse: 74   Resp: 18   Temp: 97.7 °F (36.5 °C)   TempSrc: Temporal   Weight: (!) 163 kg (360 lb)   Height: 188  "cm (74\")   PainSc: 10-Worst pain ever     Body mass index is 46.22 kg/m².    STEADI Fall Risk Assessment has not been completed.     Review of Systems     ROS:  No fevers, chills, sweats, or body aches. No shortness of breath.     I have reviewed the HPI and ROS as documented by MA/RN. Shea Daniels MD    Physical Exam     NAD, well dressed, well nourished  Normocephalic, atraumatic  EOMI, no scleral icterus  No respiratory distress, no cough  AAOx3, pleasant, cooperative  RRR, dopplerable DP and PT pulses BLE, no pitting edema  Significant callus to BLE plantar foot ulcers.  The LLE heel wound appears about the same depth but dusky necrotic muscle tissue noted.  RLE plantar wound with callus buildup, and slough and devitalized tissue noted in the base which is new from his prior visit. See photos below for details.                  Result Review :  The following data was reviewed by: Shea Daniels MD on 08/18/2021:    Prior visit notes, prior wound photos, recent MRI, wound cultures, notes regarding discussion with infectious disease    Wound debridement  Performed by: Shea Daniels MD  Authorized by: Shea Daniels MD     Correct patient: Identification verified by two methods:     Verbally and Date of birth  Correct procedure/consent    Correct site/side    Correct equipment as applicable    How many wounds are you performing a debridement on?:  2  Wound 1:     Provider Documentation    Debridement type:  Sharp/excisional    Betadine      Other:  Sterile 10 blade scalpel     None    Tissue debrided:  Moderate    Type tissue:  Slough and Other    Other tissue type:  Surrounding skin callus and devitalized epidermis, necrotic muscle and fat debrided    Level removed:  Muscle and Subcutaneous    Patient tolerance:  Good    Hemostasis achieved by:  Silver nitrate and Direct pressure    Wound Bed Post Debridement: See Photo      Description:  Left foot 5 x 4 cm wound Surrounding skin callus and devitalized " epidermis, necrotic muscle and fat debrided  Wound 2:       Provider Documentation:     Debridement type:  Sharp/Excisional    Betadine      Other:  Sterile 10 blade scalpel     None    Tissue debrided:  Small    Type tissue:  Slough and Other    Other tissue type:  Surrounding skin callus and devitalized epidermis    Level removed:  Subcutaneous    Patient tolerance:  Good    Hemostasis achieved by:  Direct Pressure and Silver Nitrate    Wound Bed Post Debridement: See Photo      Description:  3.5 x 3 cm area wound surrounding skin callus and devitalized epidermis Right foot debrided       Post debridement photos:                     Assessment and Plan   Diagnoses and all orders for this visit:    1. Diabetic ulcer of left heel associated with type 2 diabetes mellitus, with necrosis of bone (CMS/HCC) (Primary)  -     Ambulatory Referral to [facility] For Infusion Treatment  -     Ambulatory Referral for PICC  -     Cancel: ertapenem (INVanz) 1 g/100 mL 0.9% NS VTB (mbp)  -     Wound debridement    2. Diabetic ulcer of right midfoot associated with type 2 diabetes mellitus, with muscle involvement without evidence of necrosis (CMS/HCC)  -     Wound debridement    3. Acute osteomyelitis of left calcaneus (CMS/HCC)  -     Ambulatory Referral to [facility] For Infusion Treatment  -     Ambulatory Referral for PICC  -     Cancel: ertapenem (INVanz) 1 g/100 mL 0.9% NS VTB (mbp)      Wounds packed with Aquacel Ag after debridement.  Order for PICC line placement and ertapenem 1 g every 24 hours placed.  Wound care orders placed for the patient to receive his dressing changes daily at [facility] when he gets his IV antibiotics.  He says he will not be able to do the dressings himself and needs to have them done when he gets his medications.  I will see him in 2 weeks unless he notes a significant worsening of his wounds.  Counseling regarding appropriate offloading and control of his blood sugars is discussed today.  He is instructed to get  his labs drawn when he has his PICC line placed.  We will repeat them in 3 weeks per ID recommendations.    Patient was given instructions and counseling regarding their condition or for health maintenance advice, as well as the wound care plan and recommendations. They understand and agree with the plan.  They will follow back up here in the clinic but are instructed to contact us in the interim should they have any new, returning, or worsening symptoms or concerns. Please see specific information pulled into the AVS if appropriate.         Follow Up   Return in about 2 weeks (around 9/1/2021) for Recheck.      Shea Daniels MD

## 2021-08-19 ENCOUNTER — APPOINTMENT (OUTPATIENT)
Dept: INFUSION THERAPY | Facility: HOSPITAL | Age: 61
End: 2021-08-19

## 2021-08-20 ENCOUNTER — HOSPITAL ENCOUNTER (OUTPATIENT)
Dept: INFUSION THERAPY | Facility: HOSPITAL | Age: 61
Setting detail: INFUSION SERIES
Discharge: HOME | End: 2021-08-20

## 2021-08-20 VITALS
HEART RATE: 77 BPM | OXYGEN SATURATION: 95 % | RESPIRATION RATE: 20 BRPM | SYSTOLIC BLOOD PRESSURE: 117 MMHG | TEMPERATURE: 98.2 F | HEIGHT: 74 IN | WEIGHT: 315 LBS | DIASTOLIC BLOOD PRESSURE: 85 MMHG | BODY MASS INDEX: 40.43 KG/M2

## 2021-08-20 DIAGNOSIS — L97.424 DIABETIC ULCER OF LEFT HEEL ASSOCIATED WITH TYPE 2 DIABETES MELLITUS, WITH NECROSIS OF BONE (HCC): ICD-10-CM

## 2021-08-20 DIAGNOSIS — M86.172 ACUTE OSTEOMYELITIS OF LEFT CALCANEUS (HCC): Primary | ICD-10-CM

## 2021-08-20 DIAGNOSIS — E11.621 DIABETIC ULCER OF RIGHT MIDFOOT ASSOCIATED WITH TYPE 2 DIABETES MELLITUS, WITH MUSCLE INVOLVEMENT WITHOUT EVIDENCE OF NECROSIS (HCC): ICD-10-CM

## 2021-08-20 DIAGNOSIS — E11.621 DIABETIC ULCER OF LEFT HEEL ASSOCIATED WITH TYPE 2 DIABETES MELLITUS, WITH NECROSIS OF BONE (HCC): ICD-10-CM

## 2021-08-20 DIAGNOSIS — L97.415 DIABETIC ULCER OF RIGHT MIDFOOT ASSOCIATED WITH TYPE 2 DIABETES MELLITUS, WITH MUSCLE INVOLVEMENT WITHOUT EVIDENCE OF NECROSIS (HCC): ICD-10-CM

## 2021-08-20 PROCEDURE — G0463 HOSPITAL OUTPT CLINIC VISIT: HCPCS

## 2021-08-20 PROCEDURE — 25010000002 ERTAPENEM PER 500 MG: Performed by: EMERGENCY MEDICINE

## 2021-08-20 PROCEDURE — 96365 THER/PROPH/DIAG IV INF INIT: CPT

## 2021-08-20 PROCEDURE — C1751 CATH, INF, PER/CENT/MIDLINE: HCPCS

## 2021-08-20 RX ADMIN — ERTAPENEM 1 G: 1 INJECTION INTRAMUSCULAR; INTRAVENOUS at 11:52

## 2021-08-20 NOTE — CONSULTS
PICC Line Insertion Procedure Note    Procedure: Insertion of #4 FR/18G PICC    Indications:  Long Term IV therapy    Procedure Details:  Informed consent was obtained for the procedure.  Risk include, but are not limited to infection, air embolism, catheter tip moving, catheter blockage and phlebitis.     Active Time Out: 1105 Sanjeev Bates RN      Maximum sterile technique was used including antiseptics, cap, gloves, gown, hand hygiene, mask and sheet.    #4 FR/18G PICC inserted to the R Basilic vein per hospital protocol.   Blood return:  yes    Lot #: [id number]  Expiration date: 2022-08-31    Findings:  Catheter inserted to 48 cm, with 0 cm. Exposed. Mid upper arm circumference is 33 cm.  There were no changes to vital signs. Catheter was flushed with 20 cc NS. Patient did tolerate procedure well.    Recommendations:  PICC Brochure given to patient with teaching instruction.

## 2021-08-21 ENCOUNTER — HOSPITAL ENCOUNTER (OUTPATIENT)
Dept: INFUSION THERAPY | Facility: HOSPITAL | Age: 61
Setting detail: INFUSION SERIES
Discharge: HOME | End: 2021-08-21

## 2021-08-21 VITALS
OXYGEN SATURATION: 98 % | TEMPERATURE: 98.3 F | BODY MASS INDEX: 40.43 KG/M2 | HEIGHT: 74 IN | DIASTOLIC BLOOD PRESSURE: 80 MMHG | WEIGHT: 315 LBS | HEART RATE: 84 BPM | SYSTOLIC BLOOD PRESSURE: 129 MMHG | RESPIRATION RATE: 20 BRPM

## 2021-08-21 DIAGNOSIS — E11.621 DIABETIC ULCER OF LEFT HEEL ASSOCIATED WITH TYPE 2 DIABETES MELLITUS, WITH NECROSIS OF BONE (HCC): ICD-10-CM

## 2021-08-21 DIAGNOSIS — L97.424 DIABETIC ULCER OF LEFT HEEL ASSOCIATED WITH TYPE 2 DIABETES MELLITUS, WITH NECROSIS OF BONE (HCC): ICD-10-CM

## 2021-08-21 DIAGNOSIS — L97.415 DIABETIC ULCER OF RIGHT MIDFOOT ASSOCIATED WITH TYPE 2 DIABETES MELLITUS, WITH MUSCLE INVOLVEMENT WITHOUT EVIDENCE OF NECROSIS (HCC): ICD-10-CM

## 2021-08-21 DIAGNOSIS — E11.621 DIABETIC ULCER OF RIGHT MIDFOOT ASSOCIATED WITH TYPE 2 DIABETES MELLITUS, WITH MUSCLE INVOLVEMENT WITHOUT EVIDENCE OF NECROSIS (HCC): ICD-10-CM

## 2021-08-21 DIAGNOSIS — M86.172 ACUTE OSTEOMYELITIS OF LEFT CALCANEUS (HCC): Primary | ICD-10-CM

## 2021-08-21 PROCEDURE — G0463 HOSPITAL OUTPT CLINIC VISIT: HCPCS

## 2021-08-21 PROCEDURE — 25010000002 ERTAPENEM PER 500 MG: Performed by: EMERGENCY MEDICINE

## 2021-08-21 PROCEDURE — 96365 THER/PROPH/DIAG IV INF INIT: CPT

## 2021-08-21 RX ADMIN — ERTAPENEM 1 G: 1 INJECTION INTRAMUSCULAR; INTRAVENOUS at 10:54

## 2021-08-22 ENCOUNTER — HOSPITAL ENCOUNTER (OUTPATIENT)
Dept: INFUSION THERAPY | Facility: HOSPITAL | Age: 61
Setting detail: INFUSION SERIES
Discharge: HOME | End: 2021-08-22

## 2021-08-22 VITALS
SYSTOLIC BLOOD PRESSURE: 101 MMHG | TEMPERATURE: 98.1 F | OXYGEN SATURATION: 97 % | DIASTOLIC BLOOD PRESSURE: 58 MMHG | HEART RATE: 70 BPM | RESPIRATION RATE: 20 BRPM

## 2021-08-22 DIAGNOSIS — L97.424 DIABETIC ULCER OF LEFT HEEL ASSOCIATED WITH TYPE 2 DIABETES MELLITUS, WITH NECROSIS OF BONE (HCC): ICD-10-CM

## 2021-08-22 DIAGNOSIS — E11.621 DIABETIC ULCER OF RIGHT MIDFOOT ASSOCIATED WITH TYPE 2 DIABETES MELLITUS, WITH MUSCLE INVOLVEMENT WITHOUT EVIDENCE OF NECROSIS (HCC): ICD-10-CM

## 2021-08-22 DIAGNOSIS — M86.172 ACUTE OSTEOMYELITIS OF LEFT CALCANEUS (HCC): Primary | ICD-10-CM

## 2021-08-22 DIAGNOSIS — E11.621 DIABETIC ULCER OF LEFT HEEL ASSOCIATED WITH TYPE 2 DIABETES MELLITUS, WITH NECROSIS OF BONE (HCC): ICD-10-CM

## 2021-08-22 DIAGNOSIS — L97.415 DIABETIC ULCER OF RIGHT MIDFOOT ASSOCIATED WITH TYPE 2 DIABETES MELLITUS, WITH MUSCLE INVOLVEMENT WITHOUT EVIDENCE OF NECROSIS (HCC): ICD-10-CM

## 2021-08-22 PROCEDURE — 96365 THER/PROPH/DIAG IV INF INIT: CPT

## 2021-08-22 PROCEDURE — 25010000002 ERTAPENEM PER 500 MG: Performed by: EMERGENCY MEDICINE

## 2021-08-22 RX ADMIN — ERTAPENEM 1 G: 1 INJECTION INTRAMUSCULAR; INTRAVENOUS at 11:51

## 2021-08-23 ENCOUNTER — OFFICE VISIT (OUTPATIENT)
Dept: WOUND CARE | Facility: HOSPITAL | Age: 61
End: 2021-08-23

## 2021-08-23 ENCOUNTER — HOSPITAL ENCOUNTER (OUTPATIENT)
Dept: INFUSION THERAPY | Facility: HOSPITAL | Age: 61
Setting detail: INFUSION SERIES
Discharge: HOME | End: 2021-08-23

## 2021-08-23 ENCOUNTER — TELEPHONE (OUTPATIENT)
Dept: WOUND CARE | Facility: HOSPITAL | Age: 61
End: 2021-08-23

## 2021-08-23 VITALS
HEIGHT: 74 IN | WEIGHT: 315 LBS | TEMPERATURE: 97.3 F | BODY MASS INDEX: 40.43 KG/M2 | DIASTOLIC BLOOD PRESSURE: 70 MMHG | SYSTOLIC BLOOD PRESSURE: 100 MMHG | RESPIRATION RATE: 18 BRPM | HEART RATE: 72 BPM

## 2021-08-23 VITALS
SYSTOLIC BLOOD PRESSURE: 115 MMHG | RESPIRATION RATE: 20 BRPM | OXYGEN SATURATION: 96 % | HEART RATE: 66 BPM | DIASTOLIC BLOOD PRESSURE: 68 MMHG | TEMPERATURE: 98.3 F

## 2021-08-23 DIAGNOSIS — M86.8X7 OTHER OSTEOMYELITIS OF LEFT FOOT (HCC): ICD-10-CM

## 2021-08-23 DIAGNOSIS — M86.172 ACUTE OSTEOMYELITIS OF LEFT CALCANEUS (HCC): Primary | ICD-10-CM

## 2021-08-23 DIAGNOSIS — E11.621 DIABETIC ULCER OF LEFT HEEL ASSOCIATED WITH TYPE 2 DIABETES MELLITUS, WITH NECROSIS OF BONE (HCC): Primary | ICD-10-CM

## 2021-08-23 DIAGNOSIS — T14.8XXA WOUND INFECTION: ICD-10-CM

## 2021-08-23 DIAGNOSIS — E11.621 DIABETIC ULCER OF RIGHT MIDFOOT ASSOCIATED WITH TYPE 2 DIABETES MELLITUS, WITH MUSCLE INVOLVEMENT WITHOUT EVIDENCE OF NECROSIS (HCC): ICD-10-CM

## 2021-08-23 DIAGNOSIS — E11.621 DIABETIC ULCER OF LEFT HEEL ASSOCIATED WITH TYPE 2 DIABETES MELLITUS, WITH NECROSIS OF BONE (HCC): ICD-10-CM

## 2021-08-23 DIAGNOSIS — L97.424 DIABETIC ULCER OF LEFT HEEL ASSOCIATED WITH TYPE 2 DIABETES MELLITUS, WITH NECROSIS OF BONE (HCC): ICD-10-CM

## 2021-08-23 DIAGNOSIS — L08.9 WOUND INFECTION: ICD-10-CM

## 2021-08-23 DIAGNOSIS — L97.415 DIABETIC ULCER OF RIGHT MIDFOOT ASSOCIATED WITH TYPE 2 DIABETES MELLITUS, WITH MUSCLE INVOLVEMENT WITHOUT EVIDENCE OF NECROSIS (HCC): ICD-10-CM

## 2021-08-23 DIAGNOSIS — L97.424 DIABETIC ULCER OF LEFT HEEL ASSOCIATED WITH TYPE 2 DIABETES MELLITUS, WITH NECROSIS OF BONE (HCC): Primary | ICD-10-CM

## 2021-08-23 PROCEDURE — 99213 OFFICE O/P EST LOW 20 MIN: CPT | Performed by: EMERGENCY MEDICINE

## 2021-08-23 PROCEDURE — 96365 THER/PROPH/DIAG IV INF INIT: CPT

## 2021-08-23 PROCEDURE — 25010000002 ERTAPENEM PER 500 MG: Performed by: EMERGENCY MEDICINE

## 2021-08-23 PROCEDURE — 97597 DBRDMT OPN WND 1ST 20 CM/<: CPT | Performed by: EMERGENCY MEDICINE

## 2021-08-23 PROCEDURE — 11043 DBRDMT MUSC&/FSCA 1ST 20/<: CPT | Performed by: EMERGENCY MEDICINE

## 2021-08-23 RX ADMIN — ERTAPENEM 1 G: 1 INJECTION INTRAMUSCULAR; INTRAVENOUS at 11:00

## 2021-08-23 NOTE — PROGRESS NOTES
Chief Complaint  Wound Check (DM ULCERS: BILATERAL FOOT)    Subjective        Wound Check        Patient is a 60-year-old type II diabetic with neuropathy and insulin dependence.  He has been followed in the wound care clinic for nonhealing ulcers on his left heel and right distal midfoot.  He had positive cultures and finally got his MRI performed which showed osteomyelitis of the left heel.  He still has not had his labs done that I had previously recommended.     Apparently home health contacted our nursing staff and felt that his BLE wounds were looking worse and requested that we get him in to be seen today.  He has no pain in these areas due to neuropathy.  He has a PICC line now and is undergoing IV antibiotics (ertapenem).    Allergies:  Adhesive tape      Current Outpatient Medications:   •  alfuzosin (UROXATRAL) 10 MG 24 hr tablet, Take 10 mg by mouth Daily., Disp: , Rfl:   •  apixaban (Eliquis) 5 MG tablet tablet, Take 10 mg by mouth 2 (two) times a day., Disp: , Rfl:   •  buPROPion XL (WELLBUTRIN XL) 300 MG 24 hr tablet, Take 300 mg by mouth Daily., Disp: , Rfl:   •  citalopram (CeleXA) 10 MG tablet, TAKE (1) TABLET BY MOUTH 1 TIME PER DAY AT BEDTIME, Disp: , Rfl:   •  gentamicin (GARAMYCIN) 0.1 % ointment, Apply  topically to the appropriate area as directed 3 (Three) Times a Day., Disp: 30 g, Rfl: 3  •  insulin NPH-insulin regular (NovoLIN 70/30) (70-30) 100 UNIT/ML injection, Inject 10 Units under the skin into the appropriate area as directed 2 (two) times a day., Disp: , Rfl:   •  lisinopril (PRINIVIL,ZESTRIL) 20 MG tablet, Take 20 mg by mouth Daily., Disp: , Rfl:   •  metFORMIN (GLUCOPHAGE) 1000 MG tablet, Take 2,000 mg by mouth 2 (two) times a day., Disp: , Rfl:   •  metoprolol succinate XL (TOPROL-XL) 50 MG 24 hr tablet, Take 50 mg by mouth Daily., Disp: , Rfl:   •  sertraline (ZOLOFT) 100 MG tablet, Take 100 mg by mouth Daily., Disp: , Rfl:   •  simvastatin (Zocor) 20 MG tablet, Take 20 mg by  "mouth Every Night., Disp: , Rfl:   No current facility-administered medications for this visit.    Past Medical History:   Diagnosis Date   • Abscess    • Absence of toe of right foot (CMS/HCC)    • Anxiety and depression    • Arthritis    • Atrial fibrillation (CMS/HCC)    • Claustrophobia    • Corns and callus    • Difficulty walking    • Fracture    • Hammertoe    • Heel pain    • HTN (hypertension)    • Ingrown toenail    • Left foot pain    • Limb swelling    • Obesity    • Polyneuropathy    • Pressure ulcer, stage 1    • Right foot pain    • Shortness of breath    • Tinea unguium    • Type 2 diabetes mellitus with polyneuropathy (CMS/HCC)      Past Surgical History:   Procedure Laterality Date   • CYST REMOVAL     • INCISION AND DRAINAGE ABSCESS     • OTHER SURGICAL HISTORY      Surgical clips   • TOE SURGERY      Removal   • WRIST SURGERY       Social History     Socioeconomic History   • Marital status: Single     Spouse name: Not on file   • Number of children: Not on file   • Years of education: Not on file   • Highest education level: Not on file   Tobacco Use   • Smoking status: Former Smoker     Packs/day: 20.00   • Smokeless tobacco: Former User   Substance and Sexual Activity   • Alcohol use: Yes     Comment: Rare   • Drug use: Not Currently     Family History   Problem Relation Age of Onset   • Heart disease Mother    • Heart disease Father    • Cancer Father         Unspecified   • Heart disease Brother          Objective     Vitals:    08/23/21 1300   BP: 100/70   BP Location: Left arm   Patient Position: Sitting   Pulse: 72   Resp: 18   Temp: 97.3 °F (36.3 °C)   TempSrc: Temporal   Weight: (!) 158 kg (348 lb)   Height: 188 cm (74\")   PainSc: 0-No pain     Body mass index is 44.68 kg/m².    STEADI Fall Risk Assessment has not been completed.     Review of Systems     ROS:  No fevers, chills, sweats, or body aches. No shortness of breath.     I have reviewed the HPI and ROS as documented by MA/RN. " Shea Daniels MD    Physical Exam     NAD, well dressed, well nourished  Normocephalic, atraumatic  EOMI, no scleral icterus  No respiratory distress, no cough  AAOx3, pleasant, cooperative  RRR, dopplerable DP and PT pulses BLE, no pitting edema  Callus to BLE plantar foot ulcers.  The LLE heel wound actually looks slightly healthier than at his visit last week.  RLE plantar wound with callus buildup, slough and devitalized tissue noted in the base which is also slightly improved his prior visit. See photos below for details.                  Result Review :  The following data was reviewed by: Shea Daniels MD on 08/18/2021:    Prior visit notes, prior wound photos, recent MRI, wound cultures, notes regarding discussion with infectious disease    Wound debridement  Performed by: Shea Daniels MD  Authorized by: Shea Daniels MD   Associated Wounds:   Wound 06/22/21 1130 Right midline foot  Wound 06/22/21 1133 Left lateral heel    Actual time of Time Out:  13:06 EDT  Correct patient: Identification verified by two methods:     Verbally and Date of birth  Correct procedure/consent    Correct site/side    Correct equipment as applicable    How many wounds are you performing a debridement on?:  2  Wound 1:     Nursing Documentation:     Wound Location:  LEFT PLANTAR FOOT  Measurements:     Length:  2.8 cm    Width:  2.1 cm    Depth:  1 cm    The total amount debrided on this wound was under 20 cm2.     Culture: No      Stage/Type:  FULL     Photo: Yes    Legend:    Granulation %:     %: 4      Exposed bone: No      Exposed tendon: No      Odor: No      Drainage: Yes      Characteristic:  Serosang    Drainage amount:  Moderate    Wound edges open: Yes      Periwound:  Clear and Intact    Impression:  Improved    Short Term Goal:  INCREASE GRANULATION, DECREASE SIZE    Wound Cleanser:  NS    Primary Dressing:  AQUACEL AG    Secondary Dressing:  GAUZE, ABD PAD, KERLIX    Provider Documentation     Debridement type:  Sharp/excisional    Betadine      Other:  Sterile 10 blade scalpel     None    Tissue debrided:  Small    Type tissue:  Other    Other tissue type:  Skin callus and nonviable epidermis    Level removed:  Shallow Full    Patient tolerance:  Good    Wound Bed Post Debridement: See Photo      Description:  LLE plantar wound, 4.5 x 2.5 cm Skin callus and nonviable epidermis debrided  Wound 2:     Wound Location:  RIGHT PLANTAR FOOT   Measurements:     Length:  1.9 cm    Width:  1.3 cm    Depth:  0.2 cm   The total amount debrided on this wound was under 20 cm2.    Culture: No      Stage/Type:  FULL     Photo: Yes          Legend:    Granulation %:     %: 4     Slough %:     1-25%: 2      Exposed bone: No      Exposed tendon: No      Odor: No      Drainage: Yes      Characteristic:  Serosang    Drainage amount:  Moderate    Wound edges open: Yes      Periwound:  Clear and Intact    Impression:  Improved    Short Term Goal:  INCREASE GRANULATION, DECREASE SIZE    Wound Cleanser:  NS    Primary Dressing:  AQUACEL AG     Secondary Dressing:  GAUZE, KERLIX      Provider Documentation:     Debridement type:  Sharp/Excisional    Betadine      Other:  Sterile 10 blade scalpel     None    Type tissue:  Other    Other tissue type:  Skin callus and nonviable epidermis debrided    Level removed:  Subcutaneous and Muscle    Patient tolerance:  Good    Wound Bed Post Debridement: See Photo      Description:  RLE plantar wound, 2.5 x 3 cm Skin callus and nonviable epidermis debrided as well as small area of nonviable subcutaneous and muscle tissue from the base of the wound       Post debridement photos:                             Assessment and Plan   Diagnoses and all orders for this visit:    1. Diabetic ulcer of left heel associated with type 2 diabetes mellitus, with necrosis of bone (CMS/HCC) (Primary)  -     WOUND CARE HYPERBARIC; Future  -     Wound debridement    2. Diabetic ulcer of right midfoot  associated with type 2 diabetes mellitus, with muscle involvement without evidence of necrosis (CMS/HCC)  -     Wound debridement    3. Wound infection    4. Other osteomyelitis of left foot (CMS/HCC)      He underwent HBO therapy a number of years ago and as such we originally thought he would not be eligible again.  However, we have now determined that he should be eligible to undergo hyperbaric oxygen therapy again for his osteomyelitis of the left calcaneus.  I spoke with the patient about it today and he wishes to proceed with that if we feel it would be beneficial for him.  We reiterated the importance of getting his labs performed and continuing with the IV antibiotics.  We will proceed with trying to get him set up for daily hyperbaric oxygen therapy.    Wounds were again packed with Aquacel Ag after debridement.  I will see him again in 2 weeks unless he notes a significant worsening of his wounds.  Obviously I will see him sooner if he is able to start hyperbaric treatments before then.  Counseling regarding appropriate offloading and control of his blood sugars is discussed today.  Proper nutrition and the importance of protein as building blocks is also discussed.  Prealbumin and hemoglobin A1c ordered.      Patient was given instructions and counseling regarding their condition or for health maintenance advice, as well as the wound care plan and recommendations. They understand and agree with the plan.  They will follow back up here in the clinic but are instructed to contact us in the interim should they have any new, returning, or worsening symptoms or concerns. Please see specific information pulled into the AVS if appropriate.       Follow Up   Return in about 2 weeks (around 9/6/2021) for Recheck.      Shea Daniels MD

## 2021-08-24 ENCOUNTER — HOSPITAL ENCOUNTER (OUTPATIENT)
Dept: INFUSION THERAPY | Facility: HOSPITAL | Age: 61
Setting detail: INFUSION SERIES
Discharge: HOME | End: 2021-08-24

## 2021-08-24 VITALS
SYSTOLIC BLOOD PRESSURE: 118 MMHG | OXYGEN SATURATION: 96 % | RESPIRATION RATE: 20 BRPM | HEART RATE: 76 BPM | TEMPERATURE: 98.4 F | DIASTOLIC BLOOD PRESSURE: 68 MMHG

## 2021-08-24 DIAGNOSIS — L97.422 DIABETIC ULCER OF LEFT HEEL ASSOCIATED WITH TYPE 2 DIABETES MELLITUS, WITH FAT LAYER EXPOSED (HCC): ICD-10-CM

## 2021-08-24 DIAGNOSIS — M86.172 ACUTE OSTEOMYELITIS OF LEFT CALCANEUS (HCC): Primary | ICD-10-CM

## 2021-08-24 DIAGNOSIS — E11.621 DIABETIC ULCER OF LEFT HEEL ASSOCIATED WITH TYPE 2 DIABETES MELLITUS, WITH NECROSIS OF BONE (HCC): ICD-10-CM

## 2021-08-24 DIAGNOSIS — E11.621 DIABETIC ULCER OF LEFT HEEL ASSOCIATED WITH TYPE 2 DIABETES MELLITUS, WITH FAT LAYER EXPOSED (HCC): ICD-10-CM

## 2021-08-24 DIAGNOSIS — L97.415 DIABETIC ULCER OF RIGHT MIDFOOT ASSOCIATED WITH TYPE 2 DIABETES MELLITUS, WITH MUSCLE INVOLVEMENT WITHOUT EVIDENCE OF NECROSIS (HCC): ICD-10-CM

## 2021-08-24 DIAGNOSIS — L97.424 DIABETIC ULCER OF LEFT HEEL ASSOCIATED WITH TYPE 2 DIABETES MELLITUS, WITH NECROSIS OF BONE (HCC): ICD-10-CM

## 2021-08-24 DIAGNOSIS — E11.621 DIABETIC ULCER OF RIGHT MIDFOOT ASSOCIATED WITH TYPE 2 DIABETES MELLITUS, WITH MUSCLE INVOLVEMENT WITHOUT EVIDENCE OF NECROSIS (HCC): ICD-10-CM

## 2021-08-24 LAB
ANION GAP SERPL CALCULATED.3IONS-SCNC: 9.9 MMOL/L (ref 5–15)
BASOPHILS # BLD AUTO: 0.03 10*3/MM3 (ref 0–0.2)
BASOPHILS NFR BLD AUTO: 0.8 % (ref 0–1.5)
BUN SERPL-MCNC: 11 MG/DL (ref 8–23)
BUN/CREAT SERPL: 17.7 (ref 7–25)
CALCIUM SPEC-SCNC: 8.8 MG/DL (ref 8.6–10.5)
CHLORIDE SERPL-SCNC: 102 MMOL/L (ref 98–107)
CO2 SERPL-SCNC: 24.1 MMOL/L (ref 22–29)
CREAT SERPL-MCNC: 0.62 MG/DL (ref 0.76–1.27)
CRP SERPL-MCNC: 1.09 MG/DL (ref 0–0.5)
DEPRECATED RDW RBC AUTO: 44.5 FL (ref 37–54)
EOSINOPHIL # BLD AUTO: 0.05 10*3/MM3 (ref 0–0.4)
EOSINOPHIL NFR BLD AUTO: 1.3 % (ref 0.3–6.2)
ERYTHROCYTE [DISTWIDTH] IN BLOOD BY AUTOMATED COUNT: 14.6 % (ref 12.3–15.4)
ERYTHROCYTE [SEDIMENTATION RATE] IN BLOOD: 8 MM/HR (ref 0–20)
GFR SERPL CREATININE-BSD FRML MDRD: 131 ML/MIN/1.73
GLUCOSE SERPL-MCNC: 303 MG/DL (ref 65–99)
HBA1C MFR BLD: 8.5 % (ref 4.8–5.6)
HCT VFR BLD AUTO: 40.9 % (ref 37.5–51)
HGB BLD-MCNC: 13.5 G/DL (ref 13–17.7)
IMM GRANULOCYTES # BLD AUTO: 0.01 10*3/MM3 (ref 0–0.05)
IMM GRANULOCYTES NFR BLD AUTO: 0.3 % (ref 0–0.5)
LYMPHOCYTES # BLD AUTO: 0.67 10*3/MM3 (ref 0.7–3.1)
LYMPHOCYTES NFR BLD AUTO: 16.8 % (ref 19.6–45.3)
MCH RBC QN AUTO: 28 PG (ref 26.6–33)
MCHC RBC AUTO-ENTMCNC: 33 G/DL (ref 31.5–35.7)
MCV RBC AUTO: 84.9 FL (ref 79–97)
MONOCYTES # BLD AUTO: 0.26 10*3/MM3 (ref 0.1–0.9)
MONOCYTES NFR BLD AUTO: 6.5 % (ref 5–12)
NEUTROPHILS NFR BLD AUTO: 2.97 10*3/MM3 (ref 1.7–7)
NEUTROPHILS NFR BLD AUTO: 74.3 % (ref 42.7–76)
NRBC BLD AUTO-RTO: 0 /100 WBC (ref 0–0.2)
PLATELET # BLD AUTO: 86 10*3/MM3 (ref 140–450)
PMV BLD AUTO: 10.7 FL (ref 6–12)
POTASSIUM SERPL-SCNC: 4.4 MMOL/L (ref 3.5–5.2)
PREALB SERPL-MCNC: 10.2 MG/DL (ref 20–40)
RBC # BLD AUTO: 4.82 10*6/MM3 (ref 4.14–5.8)
SODIUM SERPL-SCNC: 136 MMOL/L (ref 136–145)
WBC # BLD AUTO: 3.99 10*3/MM3 (ref 3.4–10.8)

## 2021-08-24 PROCEDURE — 83036 HEMOGLOBIN GLYCOSYLATED A1C: CPT | Performed by: EMERGENCY MEDICINE

## 2021-08-24 PROCEDURE — 85025 COMPLETE CBC W/AUTO DIFF WBC: CPT | Performed by: EMERGENCY MEDICINE

## 2021-08-24 PROCEDURE — 85652 RBC SED RATE AUTOMATED: CPT | Performed by: EMERGENCY MEDICINE

## 2021-08-24 PROCEDURE — 80048 BASIC METABOLIC PNL TOTAL CA: CPT | Performed by: EMERGENCY MEDICINE

## 2021-08-24 PROCEDURE — 96365 THER/PROPH/DIAG IV INF INIT: CPT

## 2021-08-24 PROCEDURE — 25010000002 ERTAPENEM PER 500 MG: Performed by: EMERGENCY MEDICINE

## 2021-08-24 PROCEDURE — 97602 WOUND(S) CARE NON-SELECTIVE: CPT

## 2021-08-24 PROCEDURE — 86140 C-REACTIVE PROTEIN: CPT | Performed by: EMERGENCY MEDICINE

## 2021-08-24 PROCEDURE — G0463 HOSPITAL OUTPT CLINIC VISIT: HCPCS

## 2021-08-24 PROCEDURE — 84134 ASSAY OF PREALBUMIN: CPT | Performed by: EMERGENCY MEDICINE

## 2021-08-24 RX ADMIN — ERTAPENEM 1 G: 1 INJECTION INTRAMUSCULAR; INTRAVENOUS at 10:53

## 2021-08-25 ENCOUNTER — HOSPITAL ENCOUNTER (OUTPATIENT)
Dept: INFUSION THERAPY | Facility: HOSPITAL | Age: 61
Setting detail: INFUSION SERIES
Discharge: HOME | End: 2021-08-25

## 2021-08-25 ENCOUNTER — TELEPHONE (OUTPATIENT)
Dept: WOUND CARE | Facility: HOSPITAL | Age: 61
End: 2021-08-25

## 2021-08-25 VITALS
OXYGEN SATURATION: 96 % | RESPIRATION RATE: 20 BRPM | HEART RATE: 73 BPM | TEMPERATURE: 98.2 F | DIASTOLIC BLOOD PRESSURE: 67 MMHG | SYSTOLIC BLOOD PRESSURE: 112 MMHG

## 2021-08-25 DIAGNOSIS — E11.621 DIABETIC ULCER OF LEFT HEEL ASSOCIATED WITH TYPE 2 DIABETES MELLITUS, WITH NECROSIS OF BONE (HCC): ICD-10-CM

## 2021-08-25 DIAGNOSIS — M86.172 ACUTE OSTEOMYELITIS OF LEFT CALCANEUS (HCC): Primary | ICD-10-CM

## 2021-08-25 DIAGNOSIS — L97.415 DIABETIC ULCER OF RIGHT MIDFOOT ASSOCIATED WITH TYPE 2 DIABETES MELLITUS, WITH MUSCLE INVOLVEMENT WITHOUT EVIDENCE OF NECROSIS (HCC): ICD-10-CM

## 2021-08-25 DIAGNOSIS — E11.621 DIABETIC ULCER OF RIGHT MIDFOOT ASSOCIATED WITH TYPE 2 DIABETES MELLITUS, WITH MUSCLE INVOLVEMENT WITHOUT EVIDENCE OF NECROSIS (HCC): ICD-10-CM

## 2021-08-25 DIAGNOSIS — L97.424 DIABETIC ULCER OF LEFT HEEL ASSOCIATED WITH TYPE 2 DIABETES MELLITUS, WITH NECROSIS OF BONE (HCC): ICD-10-CM

## 2021-08-25 PROCEDURE — 97602 WOUND(S) CARE NON-SELECTIVE: CPT

## 2021-08-25 PROCEDURE — G0463 HOSPITAL OUTPT CLINIC VISIT: HCPCS

## 2021-08-25 PROCEDURE — 96365 THER/PROPH/DIAG IV INF INIT: CPT

## 2021-08-25 PROCEDURE — 25010000002 ERTAPENEM PER 500 MG: Performed by: EMERGENCY MEDICINE

## 2021-08-25 RX ADMIN — ERTAPENEM 1 G: 1 INJECTION INTRAMUSCULAR; INTRAVENOUS at 11:13

## 2021-08-26 ENCOUNTER — HOSPITAL ENCOUNTER (OUTPATIENT)
Dept: INFUSION THERAPY | Facility: HOSPITAL | Age: 61
Setting detail: INFUSION SERIES
Discharge: HOME | End: 2021-08-26

## 2021-08-26 VITALS
OXYGEN SATURATION: 97 % | SYSTOLIC BLOOD PRESSURE: 94 MMHG | RESPIRATION RATE: 20 BRPM | HEART RATE: 67 BPM | TEMPERATURE: 98.9 F | DIASTOLIC BLOOD PRESSURE: 59 MMHG

## 2021-08-26 DIAGNOSIS — M86.172 ACUTE OSTEOMYELITIS OF LEFT CALCANEUS (HCC): Primary | ICD-10-CM

## 2021-08-26 DIAGNOSIS — E11.621 DIABETIC ULCER OF RIGHT MIDFOOT ASSOCIATED WITH TYPE 2 DIABETES MELLITUS, WITH MUSCLE INVOLVEMENT WITHOUT EVIDENCE OF NECROSIS (HCC): ICD-10-CM

## 2021-08-26 DIAGNOSIS — E11.621 DIABETIC ULCER OF LEFT HEEL ASSOCIATED WITH TYPE 2 DIABETES MELLITUS, WITH NECROSIS OF BONE (HCC): ICD-10-CM

## 2021-08-26 DIAGNOSIS — L97.415 DIABETIC ULCER OF RIGHT MIDFOOT ASSOCIATED WITH TYPE 2 DIABETES MELLITUS, WITH MUSCLE INVOLVEMENT WITHOUT EVIDENCE OF NECROSIS (HCC): ICD-10-CM

## 2021-08-26 DIAGNOSIS — L97.424 DIABETIC ULCER OF LEFT HEEL ASSOCIATED WITH TYPE 2 DIABETES MELLITUS, WITH NECROSIS OF BONE (HCC): ICD-10-CM

## 2021-08-26 PROCEDURE — 96365 THER/PROPH/DIAG IV INF INIT: CPT

## 2021-08-26 PROCEDURE — 97602 WOUND(S) CARE NON-SELECTIVE: CPT

## 2021-08-26 PROCEDURE — 25010000002 ERTAPENEM PER 500 MG: Performed by: EMERGENCY MEDICINE

## 2021-08-26 PROCEDURE — G0463 HOSPITAL OUTPT CLINIC VISIT: HCPCS

## 2021-08-26 RX ADMIN — ERTAPENEM 1 G: 1 INJECTION INTRAMUSCULAR; INTRAVENOUS at 11:27

## 2021-08-27 ENCOUNTER — HOSPITAL ENCOUNTER (OUTPATIENT)
Dept: INFUSION THERAPY | Facility: HOSPITAL | Age: 61
Setting detail: INFUSION SERIES
Discharge: HOME | End: 2021-08-27

## 2021-08-27 VITALS
TEMPERATURE: 97.9 F | RESPIRATION RATE: 20 BRPM | SYSTOLIC BLOOD PRESSURE: 99 MMHG | HEART RATE: 75 BPM | DIASTOLIC BLOOD PRESSURE: 66 MMHG | OXYGEN SATURATION: 98 %

## 2021-08-27 DIAGNOSIS — E11.621 DIABETIC ULCER OF LEFT HEEL ASSOCIATED WITH TYPE 2 DIABETES MELLITUS, WITH NECROSIS OF BONE (HCC): ICD-10-CM

## 2021-08-27 DIAGNOSIS — E11.621 DIABETIC ULCER OF RIGHT MIDFOOT ASSOCIATED WITH TYPE 2 DIABETES MELLITUS, WITH MUSCLE INVOLVEMENT WITHOUT EVIDENCE OF NECROSIS (HCC): ICD-10-CM

## 2021-08-27 DIAGNOSIS — M86.172 ACUTE OSTEOMYELITIS OF LEFT CALCANEUS (HCC): Primary | ICD-10-CM

## 2021-08-27 DIAGNOSIS — L97.415 DIABETIC ULCER OF RIGHT MIDFOOT ASSOCIATED WITH TYPE 2 DIABETES MELLITUS, WITH MUSCLE INVOLVEMENT WITHOUT EVIDENCE OF NECROSIS (HCC): ICD-10-CM

## 2021-08-27 DIAGNOSIS — L97.424 DIABETIC ULCER OF LEFT HEEL ASSOCIATED WITH TYPE 2 DIABETES MELLITUS, WITH NECROSIS OF BONE (HCC): ICD-10-CM

## 2021-08-27 PROCEDURE — G0463 HOSPITAL OUTPT CLINIC VISIT: HCPCS

## 2021-08-27 PROCEDURE — 96365 THER/PROPH/DIAG IV INF INIT: CPT

## 2021-08-27 PROCEDURE — 97602 WOUND(S) CARE NON-SELECTIVE: CPT

## 2021-08-27 PROCEDURE — 25010000002 ERTAPENEM PER 500 MG: Performed by: EMERGENCY MEDICINE

## 2021-08-27 RX ADMIN — ERTAPENEM 1 G: 1 INJECTION INTRAMUSCULAR; INTRAVENOUS at 11:08

## 2021-08-28 ENCOUNTER — HOSPITAL ENCOUNTER (OUTPATIENT)
Dept: INFUSION THERAPY | Facility: HOSPITAL | Age: 61
Setting detail: INFUSION SERIES
Discharge: HOME | End: 2021-08-28

## 2021-08-28 VITALS
OXYGEN SATURATION: 100 % | DIASTOLIC BLOOD PRESSURE: 60 MMHG | HEART RATE: 62 BPM | TEMPERATURE: 98 F | SYSTOLIC BLOOD PRESSURE: 112 MMHG

## 2021-08-28 DIAGNOSIS — E11.621 DIABETIC ULCER OF RIGHT MIDFOOT ASSOCIATED WITH TYPE 2 DIABETES MELLITUS, WITH MUSCLE INVOLVEMENT WITHOUT EVIDENCE OF NECROSIS (HCC): ICD-10-CM

## 2021-08-28 DIAGNOSIS — L97.415 DIABETIC ULCER OF RIGHT MIDFOOT ASSOCIATED WITH TYPE 2 DIABETES MELLITUS, WITH MUSCLE INVOLVEMENT WITHOUT EVIDENCE OF NECROSIS (HCC): ICD-10-CM

## 2021-08-28 DIAGNOSIS — L97.424 DIABETIC ULCER OF LEFT HEEL ASSOCIATED WITH TYPE 2 DIABETES MELLITUS, WITH NECROSIS OF BONE (HCC): ICD-10-CM

## 2021-08-28 DIAGNOSIS — M86.172 ACUTE OSTEOMYELITIS OF LEFT CALCANEUS (HCC): Primary | ICD-10-CM

## 2021-08-28 DIAGNOSIS — E11.621 DIABETIC ULCER OF LEFT HEEL ASSOCIATED WITH TYPE 2 DIABETES MELLITUS, WITH NECROSIS OF BONE (HCC): ICD-10-CM

## 2021-08-28 PROCEDURE — 96365 THER/PROPH/DIAG IV INF INIT: CPT

## 2021-08-28 PROCEDURE — G0463 HOSPITAL OUTPT CLINIC VISIT: HCPCS

## 2021-08-28 PROCEDURE — 97602 WOUND(S) CARE NON-SELECTIVE: CPT

## 2021-08-28 PROCEDURE — 25010000002 ERTAPENEM PER 500 MG: Performed by: EMERGENCY MEDICINE

## 2021-08-28 RX ADMIN — ERTAPENEM 1 G: 1 INJECTION INTRAMUSCULAR; INTRAVENOUS at 11:17

## 2021-08-29 ENCOUNTER — HOSPITAL ENCOUNTER (OUTPATIENT)
Dept: INFUSION THERAPY | Facility: HOSPITAL | Age: 61
Setting detail: INFUSION SERIES
Discharge: HOME | End: 2021-08-29

## 2021-08-29 VITALS
DIASTOLIC BLOOD PRESSURE: 62 MMHG | RESPIRATION RATE: 18 BRPM | OXYGEN SATURATION: 96 % | SYSTOLIC BLOOD PRESSURE: 111 MMHG | TEMPERATURE: 98.1 F | HEART RATE: 62 BPM

## 2021-08-29 DIAGNOSIS — E11.621 DIABETIC ULCER OF RIGHT MIDFOOT ASSOCIATED WITH TYPE 2 DIABETES MELLITUS, WITH MUSCLE INVOLVEMENT WITHOUT EVIDENCE OF NECROSIS (HCC): ICD-10-CM

## 2021-08-29 DIAGNOSIS — L97.415 DIABETIC ULCER OF RIGHT MIDFOOT ASSOCIATED WITH TYPE 2 DIABETES MELLITUS, WITH MUSCLE INVOLVEMENT WITHOUT EVIDENCE OF NECROSIS (HCC): ICD-10-CM

## 2021-08-29 DIAGNOSIS — M86.172 ACUTE OSTEOMYELITIS OF LEFT CALCANEUS (HCC): Primary | ICD-10-CM

## 2021-08-29 DIAGNOSIS — E11.621 DIABETIC ULCER OF LEFT HEEL ASSOCIATED WITH TYPE 2 DIABETES MELLITUS, WITH NECROSIS OF BONE (HCC): ICD-10-CM

## 2021-08-29 DIAGNOSIS — L97.424 DIABETIC ULCER OF LEFT HEEL ASSOCIATED WITH TYPE 2 DIABETES MELLITUS, WITH NECROSIS OF BONE (HCC): ICD-10-CM

## 2021-08-29 PROCEDURE — 97602 WOUND(S) CARE NON-SELECTIVE: CPT

## 2021-08-29 PROCEDURE — G0463 HOSPITAL OUTPT CLINIC VISIT: HCPCS

## 2021-08-29 PROCEDURE — 96365 THER/PROPH/DIAG IV INF INIT: CPT

## 2021-08-29 PROCEDURE — 25010000002 ERTAPENEM PER 500 MG: Performed by: EMERGENCY MEDICINE

## 2021-08-29 RX ADMIN — ERTAPENEM 1 G: 1 INJECTION INTRAMUSCULAR; INTRAVENOUS at 11:00

## 2021-08-30 ENCOUNTER — HOSPITAL ENCOUNTER (OUTPATIENT)
Dept: INFUSION THERAPY | Facility: HOSPITAL | Age: 61
Setting detail: INFUSION SERIES
Discharge: HOME | End: 2021-08-30

## 2021-08-30 VITALS
OXYGEN SATURATION: 96 % | DIASTOLIC BLOOD PRESSURE: 61 MMHG | TEMPERATURE: 98.3 F | RESPIRATION RATE: 20 BRPM | HEART RATE: 68 BPM | SYSTOLIC BLOOD PRESSURE: 102 MMHG

## 2021-08-30 DIAGNOSIS — E11.621 DIABETIC ULCER OF RIGHT MIDFOOT ASSOCIATED WITH TYPE 2 DIABETES MELLITUS, WITH MUSCLE INVOLVEMENT WITHOUT EVIDENCE OF NECROSIS (HCC): ICD-10-CM

## 2021-08-30 DIAGNOSIS — M86.172 ACUTE OSTEOMYELITIS OF LEFT CALCANEUS (HCC): Primary | ICD-10-CM

## 2021-08-30 DIAGNOSIS — L97.424 DIABETIC ULCER OF LEFT HEEL ASSOCIATED WITH TYPE 2 DIABETES MELLITUS, WITH NECROSIS OF BONE (HCC): ICD-10-CM

## 2021-08-30 DIAGNOSIS — E11.621 DIABETIC ULCER OF LEFT HEEL ASSOCIATED WITH TYPE 2 DIABETES MELLITUS, WITH NECROSIS OF BONE (HCC): ICD-10-CM

## 2021-08-30 DIAGNOSIS — L97.415 DIABETIC ULCER OF RIGHT MIDFOOT ASSOCIATED WITH TYPE 2 DIABETES MELLITUS, WITH MUSCLE INVOLVEMENT WITHOUT EVIDENCE OF NECROSIS (HCC): ICD-10-CM

## 2021-08-30 PROCEDURE — G0463 HOSPITAL OUTPT CLINIC VISIT: HCPCS

## 2021-08-30 PROCEDURE — 25010000002 ERTAPENEM PER 500 MG: Performed by: EMERGENCY MEDICINE

## 2021-08-30 PROCEDURE — 96365 THER/PROPH/DIAG IV INF INIT: CPT

## 2021-08-30 PROCEDURE — 97602 WOUND(S) CARE NON-SELECTIVE: CPT

## 2021-08-30 RX ADMIN — ERTAPENEM SODIUM 1 G: 1 INJECTION, POWDER, LYOPHILIZED, FOR SOLUTION INTRAMUSCULAR; INTRAVENOUS at 11:07

## 2021-08-31 ENCOUNTER — HOSPITAL ENCOUNTER (OUTPATIENT)
Dept: INFUSION THERAPY | Facility: HOSPITAL | Age: 61
Setting detail: INFUSION SERIES
Discharge: HOME | End: 2021-08-31

## 2021-08-31 ENCOUNTER — OFFICE VISIT (OUTPATIENT)
Dept: CARDIOLOGY | Facility: CLINIC | Age: 61
End: 2021-08-31

## 2021-08-31 VITALS
HEART RATE: 68 BPM | HEIGHT: 74 IN | SYSTOLIC BLOOD PRESSURE: 98 MMHG | DIASTOLIC BLOOD PRESSURE: 59 MMHG | WEIGHT: 315 LBS | BODY MASS INDEX: 40.43 KG/M2

## 2021-08-31 VITALS
BODY MASS INDEX: 40.43 KG/M2 | TEMPERATURE: 98.4 F | WEIGHT: 315 LBS | HEART RATE: 68 BPM | SYSTOLIC BLOOD PRESSURE: 104 MMHG | RESPIRATION RATE: 20 BRPM | DIASTOLIC BLOOD PRESSURE: 63 MMHG | HEIGHT: 74 IN | OXYGEN SATURATION: 98 %

## 2021-08-31 DIAGNOSIS — E78.5 HYPERLIPIDEMIA LDL GOAL <100: ICD-10-CM

## 2021-08-31 DIAGNOSIS — M86.172 ACUTE OSTEOMYELITIS OF LEFT CALCANEUS (HCC): Primary | ICD-10-CM

## 2021-08-31 DIAGNOSIS — I10 ESSENTIAL HYPERTENSION: ICD-10-CM

## 2021-08-31 DIAGNOSIS — L97.424 DIABETIC ULCER OF LEFT HEEL ASSOCIATED WITH TYPE 2 DIABETES MELLITUS, WITH NECROSIS OF BONE (HCC): ICD-10-CM

## 2021-08-31 DIAGNOSIS — E11.621 DIABETIC ULCER OF LEFT HEEL ASSOCIATED WITH TYPE 2 DIABETES MELLITUS, WITH NECROSIS OF BONE (HCC): ICD-10-CM

## 2021-08-31 DIAGNOSIS — M86.172 ACUTE OSTEOMYELITIS OF LEFT CALCANEUS (HCC): ICD-10-CM

## 2021-08-31 DIAGNOSIS — E11.621 DIABETIC ULCER OF RIGHT MIDFOOT ASSOCIATED WITH TYPE 2 DIABETES MELLITUS, WITH MUSCLE INVOLVEMENT WITHOUT EVIDENCE OF NECROSIS (HCC): ICD-10-CM

## 2021-08-31 DIAGNOSIS — I48.0 PAROXYSMAL ATRIAL FIBRILLATION (HCC): Primary | ICD-10-CM

## 2021-08-31 DIAGNOSIS — L97.415 DIABETIC ULCER OF RIGHT MIDFOOT ASSOCIATED WITH TYPE 2 DIABETES MELLITUS, WITH MUSCLE INVOLVEMENT WITHOUT EVIDENCE OF NECROSIS (HCC): ICD-10-CM

## 2021-08-31 PROCEDURE — 99214 OFFICE O/P EST MOD 30 MIN: CPT | Performed by: INTERNAL MEDICINE

## 2021-08-31 PROCEDURE — 25010000002 ERTAPENEM PER 500 MG: Performed by: EMERGENCY MEDICINE

## 2021-08-31 PROCEDURE — 97602 WOUND(S) CARE NON-SELECTIVE: CPT

## 2021-08-31 PROCEDURE — G0463 HOSPITAL OUTPT CLINIC VISIT: HCPCS

## 2021-08-31 PROCEDURE — 93000 ELECTROCARDIOGRAM COMPLETE: CPT | Performed by: INTERNAL MEDICINE

## 2021-08-31 PROCEDURE — 96365 THER/PROPH/DIAG IV INF INIT: CPT

## 2021-08-31 RX ADMIN — ERTAPENEM 1 G: 1 INJECTION INTRAMUSCULAR; INTRAVENOUS at 10:38

## 2021-08-31 NOTE — PROGRESS NOTES
Chief Complaint  Follow-up and Atrial Fibrillation    Subjective    Patient with no symptomatic chest pain or shortness of breath intermittent tachycardia issues    Past Medical History:   Diagnosis Date   • Absence of toe of right foot    • Acute osteomyelitis of left calcaneus  8/18/2021   • Anxiety and depression    • Arthritis    • Claustrophobia    • Corns and callus    • Diabetic ulcer of left heel associated with type 2 DM 8/18/2021   • Diabetic ulcer of left heel associated with type 2 DM 7/6/2021   • Diabetic ulcer of right midfoot associated with type 2 DM 8/18/2021   • Difficulty walking    • Essential hypertension 8/31/2021   • Hammertoe    • Hyperlipidemia LDL goal <100 8/31/2021   • Ingrown toenail    • Obesity    • Paroxysmal atrial fibrillation 8/31/2021   • Polyneuropathy    • Pressure ulcer, stage 1    • Tinea unguium    • Type 2 diabetes mellitus with polyneuropathy          Current Outpatient Medications:   •  alfuzosin (UROXATRAL) 10 MG 24 hr tablet, Take 10 mg by mouth Daily., Disp: , Rfl:   •  apixaban (Eliquis) 5 MG tablet tablet, Take 10 mg by mouth 2 (two) times a day., Disp: , Rfl:   •  buPROPion XL (WELLBUTRIN XL) 300 MG 24 hr tablet, Take 300 mg by mouth 2 (Two) Times a Day., Disp: , Rfl:   •  citalopram (CeleXA) 10 MG tablet, TAKE (1) TABLET BY MOUTH 1 TIME PER DAY AT BEDTIME, Disp: , Rfl:   •  gentamicin (GARAMYCIN) 0.1 % ointment, Apply  topically to the appropriate area as directed 3 (Three) Times a Day., Disp: 30 g, Rfl: 3  •  insulin NPH-insulin regular (NovoLIN 70/30) (70-30) 100 UNIT/ML injection, Inject 10 Units under the skin into the appropriate area as directed 2 (two) times a day., Disp: , Rfl:   •  lisinopril (PRINIVIL,ZESTRIL) 20 MG tablet, Take 20 mg by mouth Daily., Disp: , Rfl:   •  metFORMIN (GLUCOPHAGE) 1000 MG tablet, Take 2,000 mg by mouth 2 (two) times a day., Disp: , Rfl:   •  metoprolol succinate XL (TOPROL-XL) 50 MG 24 hr tablet, Take 50 mg by mouth Daily.,  "Disp: , Rfl:   •  sertraline (ZOLOFT) 100 MG tablet, Take 100 mg by mouth Daily., Disp: , Rfl:   •  simvastatin (Zocor) 20 MG tablet, Take 20 mg by mouth Every Night., Disp: , Rfl:   No current facility-administered medications for this visit.    There are no discontinued medications.  Allergies   Allergen Reactions   • Adhesive Tape Rash        Social History     Tobacco Use   • Smoking status: Former Smoker     Packs/day: 1.00     Types: Cigarettes     Quit date: 1991     Years since quittin.0   • Smokeless tobacco: Never Used   Vaping Use   • Vaping Use: Never used   Substance Use Topics   • Alcohol use: Yes     Comment: Rare   • Drug use: Not Currently       Family History   Problem Relation Age of Onset   • Heart disease Mother    • Heart disease Father    • Cancer Father         Unspecified   • Heart disease Brother       ldl 70  hdl 56    Objective     BP 98/59   Pulse 68   Ht 188 cm (74\")   Wt (!) 163 kg (360 lb)   BMI 46.22 kg/m²       Physical Exam    General Appearance:   · no acute distress  · Alert and oriented x3  HENT:   · lips not cyanotic  · Atraumatic  Neck:  · No jvd   · supple  Respiratory:  · no respiratory distress  · normal breath sounds  · no rales  Cardiovascular:  · The rate and rhythm  · no S3, no S4   · no murmur  · no rub  Extremities  · No cyanosis  · lower extremity edema: none    Skin:   · warm, dry  · No rashes      Result Review :     No results found for: PROBNP  CMP    CMP 21   Glucose 303 (A)   BUN 11   Creatinine 0.62 (A)   eGFR Non African Am 131   Sodium 136   Potassium 4.4   Chloride 102   Calcium 8.8   (A) Abnormal value            CBC w/diff    CBC w/Diff 21   WBC 3.99   RBC 4.82   Hemoglobin 13.5   Hematocrit 40.9   MCV 84.9   MCH 28.0   MCHC 33.0   RDW 14.6   Platelets 86 (A)   Neutrophil Rel % 74.3   Immature Granulocyte Rel % 0.3   Lymphocyte Rel % 16.8 (A)   Monocyte Rel % 6.5   Eosinophil Rel % 1.3   Basophil Rel % 0.8   (A) Abnormal value     "         No results found for: TSH   No results found for: FREET4   No results found for: DDIMERQUANT  No results found for: MG   No results found for: DIGOXIN   No results found for: TROPONINT          No results found for: POCTROP         ECG 12 Lead    Date/Time: 8/31/2021 4:04 PM  Performed by: Jose Duval MD  Authorized by: Jose Duval MD   Comparison: compared with previous ECG   Similar to previous ECG  Rhythm: sinus rhythm  Other findings: low voltage                   Diagnoses and all orders for this visit:    1. Paroxysmal atrial fibrillation (Primary)  Assessment & Plan:  Patient with intermittent brief breakthrough episodes continue with Toprol 50 for rate control.  Patient is on Eliquis 5 twice daily for CVA prevention check EKG next visit exercise and weight loss      2. Essential hypertension  Assessment & Plan:  Blood pressure well controlled continue lisinopril 20 mg daily      3. Hyperlipidemia LDL goal <100  Assessment & Plan:  LDL level at goal continue with zocor 20 qd      Orders:  -     Lipid Panel; Future    Other orders  -     ECG 12 Lead          Follow Up     Return in about 6 months (around 2/28/2022) for Follow with Eri Herrmann EKG with F/U.          Patient was given instructions and counseling regarding his condition or for health maintenance advice. Please see specific information pulled into the AVS if appropriate.

## 2021-08-31 NOTE — ASSESSMENT & PLAN NOTE
Patient with intermittent brief breakthrough episodes continue with Toprol 50 for rate control.  Patient is on Eliquis 5 twice daily for CVA prevention check EKG next visit exercise and weight loss

## 2021-09-01 ENCOUNTER — HOSPITAL ENCOUNTER (OUTPATIENT)
Dept: INFUSION THERAPY | Facility: HOSPITAL | Age: 61
Setting detail: INFUSION SERIES
Discharge: HOME | End: 2021-09-01

## 2021-09-01 ENCOUNTER — OFFICE VISIT (OUTPATIENT)
Dept: WOUND CARE | Facility: HOSPITAL | Age: 61
End: 2021-09-01

## 2021-09-01 VITALS
OXYGEN SATURATION: 96 % | TEMPERATURE: 98.1 F | DIASTOLIC BLOOD PRESSURE: 63 MMHG | BODY MASS INDEX: 40.43 KG/M2 | HEIGHT: 74 IN | WEIGHT: 315 LBS | HEART RATE: 67 BPM | RESPIRATION RATE: 20 BRPM | SYSTOLIC BLOOD PRESSURE: 114 MMHG

## 2021-09-01 VITALS
RESPIRATION RATE: 18 BRPM | HEART RATE: 82 BPM | TEMPERATURE: 97.3 F | SYSTOLIC BLOOD PRESSURE: 112 MMHG | OXYGEN SATURATION: 92 % | DIASTOLIC BLOOD PRESSURE: 60 MMHG

## 2021-09-01 DIAGNOSIS — L97.415 DIABETIC ULCER OF RIGHT MIDFOOT ASSOCIATED WITH TYPE 2 DIABETES MELLITUS, WITH MUSCLE INVOLVEMENT WITHOUT EVIDENCE OF NECROSIS (HCC): ICD-10-CM

## 2021-09-01 DIAGNOSIS — L97.424 DIABETIC ULCER OF LEFT HEEL ASSOCIATED WITH TYPE 2 DIABETES MELLITUS, WITH NECROSIS OF BONE (HCC): Primary | ICD-10-CM

## 2021-09-01 DIAGNOSIS — E11.621 DIABETIC ULCER OF RIGHT MIDFOOT ASSOCIATED WITH TYPE 2 DIABETES MELLITUS, WITH MUSCLE INVOLVEMENT WITHOUT EVIDENCE OF NECROSIS (HCC): ICD-10-CM

## 2021-09-01 DIAGNOSIS — E11.621 DIABETIC ULCER OF LEFT HEEL ASSOCIATED WITH TYPE 2 DIABETES MELLITUS, WITH NECROSIS OF BONE (HCC): Primary | ICD-10-CM

## 2021-09-01 DIAGNOSIS — M86.172 ACUTE OSTEOMYELITIS OF LEFT CALCANEUS (HCC): Primary | ICD-10-CM

## 2021-09-01 DIAGNOSIS — E11.621 DIABETIC ULCER OF LEFT HEEL ASSOCIATED WITH TYPE 2 DIABETES MELLITUS, WITH NECROSIS OF BONE (HCC): ICD-10-CM

## 2021-09-01 DIAGNOSIS — L97.424 DIABETIC ULCER OF LEFT HEEL ASSOCIATED WITH TYPE 2 DIABETES MELLITUS, WITH NECROSIS OF BONE (HCC): ICD-10-CM

## 2021-09-01 PROCEDURE — 25010000002 ERTAPENEM PER 500 MG: Performed by: EMERGENCY MEDICINE

## 2021-09-01 PROCEDURE — 96365 THER/PROPH/DIAG IV INF INIT: CPT

## 2021-09-01 PROCEDURE — 97597 DBRDMT OPN WND 1ST 20 CM/<: CPT | Performed by: EMERGENCY MEDICINE

## 2021-09-01 RX ADMIN — ERTAPENEM 1 G: 1 INJECTION INTRAMUSCULAR; INTRAVENOUS at 11:13

## 2021-09-01 NOTE — PROGRESS NOTES
Chief Complaint  Wound Check (BILATERAL FOOT ULCERS )    Subjective        Wound Check      Patient is a 60-year-old type II diabetic with neuropathy and insulin dependence.  He has been followed in the wound care clinic for nonhealing ulcers on his left heel and right distal midfoot. MRI showed osteomyelitis of the left heel. He had a PICC line placed 08/20/2021 and is undergoing IV antibiotics (ertapenem).  Wounds have been getting packed with Aquacel Ag. They seem to be improving.  He is awaiting insurance approval to be able to undergo hyperbaric oxygen therapy for his osteomyelitis.      Allergies:  Adhesive tape      Current Outpatient Medications:   •  alfuzosin (UROXATRAL) 10 MG 24 hr tablet, Take 10 mg by mouth Daily., Disp: , Rfl:   •  apixaban (Eliquis) 5 MG tablet tablet, Take 10 mg by mouth 2 (two) times a day., Disp: , Rfl:   •  buPROPion XL (WELLBUTRIN XL) 300 MG 24 hr tablet, Take 300 mg by mouth 2 (Two) Times a Day., Disp: , Rfl:   •  citalopram (CeleXA) 10 MG tablet, TAKE (1) TABLET BY MOUTH 1 TIME PER DAY AT BEDTIME, Disp: , Rfl:   •  gentamicin (GARAMYCIN) 0.1 % ointment, Apply  topically to the appropriate area as directed 3 (Three) Times a Day., Disp: 30 g, Rfl: 3  •  insulin NPH-insulin regular (NovoLIN 70/30) (70-30) 100 UNIT/ML injection, Inject 10 Units under the skin into the appropriate area as directed 2 (two) times a day., Disp: , Rfl:   •  lisinopril (PRINIVIL,ZESTRIL) 20 MG tablet, Take 20 mg by mouth Daily., Disp: , Rfl:   •  metFORMIN (GLUCOPHAGE) 1000 MG tablet, Take 2,000 mg by mouth 2 (two) times a day., Disp: , Rfl:   •  metoprolol succinate XL (TOPROL-XL) 50 MG 24 hr tablet, Take 50 mg by mouth Daily., Disp: , Rfl:   •  sertraline (ZOLOFT) 100 MG tablet, Take 100 mg by mouth Daily., Disp: , Rfl:   •  simvastatin (Zocor) 20 MG tablet, Take 20 mg by mouth Every Night., Disp: , Rfl:   No current facility-administered medications for this visit.    Past Medical History:   Diagnosis  Date   • Absence of toe of right foot    • Acute osteomyelitis of left calcaneus  2021   • Anxiety and depression    • Arthritis    • Claustrophobia    • Corns and callus    • Diabetic ulcer of left heel associated with type 2 DM 2021   • Diabetic ulcer of left heel associated with type 2 DM 2021   • Diabetic ulcer of right midfoot associated with type 2 DM 2021   • Difficulty walking    • Essential hypertension 2021   • Hammertoe    • Hyperlipidemia LDL goal <100 2021   • Ingrown toenail    • Obesity    • Paroxysmal atrial fibrillation 2021   • Polyneuropathy    • Pressure ulcer, stage 1    • Tinea unguium    • Type 2 diabetes mellitus with polyneuropathy      Past Surgical History:   Procedure Laterality Date   • CYST REMOVAL     • INCISION AND DRAINAGE ABSCESS     • OTHER SURGICAL HISTORY      Surgical clips   • TOE SURGERY      Removal   • WRIST SURGERY       Social History     Socioeconomic History   • Marital status: Single     Spouse name: Not on file   • Number of children: Not on file   • Years of education: Not on file   • Highest education level: Not on file   Tobacco Use   • Smoking status: Former Smoker     Packs/day: 1.00     Types: Cigarettes     Quit date: 1991     Years since quittin.0   • Smokeless tobacco: Never Used   Vaping Use   • Vaping Use: Never used   Substance and Sexual Activity   • Alcohol use: Yes     Comment: Rare   • Drug use: Not Currently   • Sexual activity: Defer     Family History   Problem Relation Age of Onset   • Heart disease Mother    • Heart disease Father    • Cancer Father         Unspecified   • Heart disease Brother          Objective     Vitals:    21 1323   BP: 112/60   BP Location: Left arm   Patient Position: Sitting   Pulse: 82   Resp: 18   Temp: 97.3 °F (36.3 °C)   TempSrc: Temporal   SpO2: 92%   PainSc: 0-No pain     There is no height or weight on file to calculate BMI.    GULSHAN Fall Risk Assessment has not been  completed.     Review of Systems     ROS:  No fevers, chills, sweats, or body aches. No shortness of breath.     I have reviewed the HPI and ROS as documented by MA/RN. Shea Daniels MD    Physical Exam     NAD, well dressed, well nourished  Normocephalic, atraumatic  EOMI, no scleral icterus  No respiratory distress, no cough  AAOx3, pleasant, cooperative  RRR, dopplerable DP and PT pulses BLE, no pitting edema  Callus to BLE plantar foot ulcers.  The LLE heel wound actually looks slightly healthier than at his visit last week.  RLE plantar wound with callus buildup, slough and devitalized tissue noted in the base which is also slightly improved his prior visit. See photos below for details.                  Result Review :  The following data was reviewed by: Shea Daniels MD on 08/18/2021:    ESR WNL  CRP slightly elevated at 1.09  Prealbumin 10.2  HgbA1c 8.5        Wound debridement  Performed by: Shea Daniels MD  Authorized by: Shea Daniels MD   Associated Wounds:   Wound 06/22/21 1130 Right midline foot  Wound 06/22/21 1133 Left lateral heel    Actual time of Time Out:  13:33 EDT  Correct patient: Identification verified by two methods:     Verbally and Date of birth  Correct procedure/consent    Correct site/side    Correct equipment as applicable    How many wounds are you performing a debridement on?:  2  Wound 1:     Nursing Documentation:     Wound Location:  Right plantar foot  Measurements:     Length:  1.5 cm    Width:  1.5 cm    Depth:  0.2 cm    The total amount debrided on this wound was under 20 cm2.     Culture: No      Photo: Yes    Legend:    Granulation %:     %: 4      Exposed bone: No      Exposed tendon: No      Odor: No      Drainage: Yes      Characteristic:  Serosang    Drainage amount:  Moderate    Wound edges open: Yes      Periwound:  Clear and Intact    Impression:  Improved    Short Term Goal:  INCREASE GRANULATION, DECREASE SIZE    Wound Cleanser:  NS    Primary  Dressing:  AQUACEL AG    Secondary Dressing:  GAUZE, KERLIX, ACE WRAP    Provider Documentation    Debridement type:  Sharp/excisional    Betadine      Other:  Sterile 10 blade scalpel     None    Tissue debrided:  Small    Type tissue:  Other    Level removed:  Shallow Full    Patient tolerance:  Good    Hemostasis achieved by:  Direct pressure    Wound Bed Post Debridement: See Photo      Description:  2.5 x 1.5 cm area of periwound callus and devitalized epidermis debrided  Wound 2:     Wound Location:  Left plantar foot   Measurements:     Length:  2.6 cm    Width:  1.9 cm    Depth:  0.9 cm   The total amount debrided on this wound was under 20 cm2.    Culture: No      Photo: Yes          Legend:    Granulation %:     %: 4      Exposed bone: No      Exposed tendon: No      Odor: No      Drainage: Yes      Characteristic:  Serosang    Drainage amount:  Moderate    Wound edges open: Yes      Periwound:  Clear and Intact    Impression:  Improved    Short Term Goal:  INCREASE GRANULATION, DECREASE SIZE    Wound Cleanser:  NS    Primary Dressing:  AQUACEL AG, DRY GAUZE PACKING    Secondary Dressing:  GAUZE, KERLIX, ACE WRAP      Provider Documentation:     Debridement type:  Sharp/Excisional    Betadine      Other:  Sterile 10 blade scalpel     None    Tissue debrided:  Moderate    Type tissue:  Other    Other tissue type:  Periwound callus and devitalized epidermis debrided    Level removed:  Shallow Full    Patient tolerance:  Good    Hemostasis achieved by:  Direct Pressure    Wound Bed Post Debridement: See Photo      Description:  4 x 3 cm area of periwound callus and devitalized epidermis debrided       Post debridement photos:                   Assessment and Plan   Diagnoses and all orders for this visit:    1. Diabetic ulcer of left heel associated with type 2 diabetes mellitus, with necrosis of bone (CMS/HCC) (Primary)  -     Wound debridement    2. Diabetic ulcer of right midfoot associated with type  2 diabetes mellitus, with muscle involvement without evidence of necrosis (CMS/HCC)  -     Wound debridement         Patient is still awaiting approval for hyperbaric oxygen therapy.  Continue IV antibiotics via PICC line.  Continue Aquacel Ag to wounds, they appear to be improving at each visit.    Wounds were again packed with Aquacel Ag after debridement.  I will see him again in 2 weeks unless he notes a significant worsening of his wounds.      Patient was given instructions and counseling regarding their condition or for health maintenance advice, as well as the wound care plan and recommendations. They understand and agree with the plan.  They will follow back up here in the clinic but are instructed to contact us in the interim should they have any new, returning, or worsening symptoms or concerns. Please see specific information pulled into the AVS if appropriate.       Follow Up   Return in about 2 weeks (around 9/15/2021) for Recheck.      Shea Daniels MD

## 2021-09-02 ENCOUNTER — HOSPITAL ENCOUNTER (OUTPATIENT)
Dept: INFUSION THERAPY | Facility: HOSPITAL | Age: 61
Setting detail: INFUSION SERIES
Discharge: HOME | End: 2021-09-02

## 2021-09-02 VITALS
SYSTOLIC BLOOD PRESSURE: 106 MMHG | RESPIRATION RATE: 20 BRPM | TEMPERATURE: 98.1 F | HEART RATE: 63 BPM | DIASTOLIC BLOOD PRESSURE: 62 MMHG | OXYGEN SATURATION: 97 %

## 2021-09-02 DIAGNOSIS — M86.172 ACUTE OSTEOMYELITIS OF LEFT CALCANEUS (HCC): Primary | ICD-10-CM

## 2021-09-02 DIAGNOSIS — E11.621 DIABETIC ULCER OF LEFT HEEL ASSOCIATED WITH TYPE 2 DIABETES MELLITUS, WITH NECROSIS OF BONE (HCC): ICD-10-CM

## 2021-09-02 DIAGNOSIS — E11.621 DIABETIC ULCER OF RIGHT MIDFOOT ASSOCIATED WITH TYPE 2 DIABETES MELLITUS, WITH MUSCLE INVOLVEMENT WITHOUT EVIDENCE OF NECROSIS (HCC): ICD-10-CM

## 2021-09-02 DIAGNOSIS — L97.424 DIABETIC ULCER OF LEFT HEEL ASSOCIATED WITH TYPE 2 DIABETES MELLITUS, WITH NECROSIS OF BONE (HCC): ICD-10-CM

## 2021-09-02 DIAGNOSIS — L97.415 DIABETIC ULCER OF RIGHT MIDFOOT ASSOCIATED WITH TYPE 2 DIABETES MELLITUS, WITH MUSCLE INVOLVEMENT WITHOUT EVIDENCE OF NECROSIS (HCC): ICD-10-CM

## 2021-09-02 PROCEDURE — 97602 WOUND(S) CARE NON-SELECTIVE: CPT

## 2021-09-02 PROCEDURE — 96365 THER/PROPH/DIAG IV INF INIT: CPT

## 2021-09-02 PROCEDURE — 25010000002 ERTAPENEM PER 500 MG: Performed by: EMERGENCY MEDICINE

## 2021-09-02 PROCEDURE — G0463 HOSPITAL OUTPT CLINIC VISIT: HCPCS

## 2021-09-02 RX ADMIN — ERTAPENEM SODIUM 1 G: 1 INJECTION, POWDER, LYOPHILIZED, FOR SOLUTION INTRAMUSCULAR; INTRAVENOUS at 10:40

## 2021-09-03 ENCOUNTER — HOSPITAL ENCOUNTER (OUTPATIENT)
Dept: INFUSION THERAPY | Facility: HOSPITAL | Age: 61
Setting detail: INFUSION SERIES
Discharge: HOME | End: 2021-09-03

## 2021-09-03 VITALS
TEMPERATURE: 98.3 F | HEART RATE: 71 BPM | RESPIRATION RATE: 20 BRPM | SYSTOLIC BLOOD PRESSURE: 110 MMHG | DIASTOLIC BLOOD PRESSURE: 68 MMHG | OXYGEN SATURATION: 98 %

## 2021-09-03 DIAGNOSIS — L97.424 DIABETIC ULCER OF LEFT HEEL ASSOCIATED WITH TYPE 2 DIABETES MELLITUS, WITH NECROSIS OF BONE (HCC): ICD-10-CM

## 2021-09-03 DIAGNOSIS — M86.172 ACUTE OSTEOMYELITIS OF LEFT CALCANEUS (HCC): Primary | ICD-10-CM

## 2021-09-03 DIAGNOSIS — L97.415 DIABETIC ULCER OF RIGHT MIDFOOT ASSOCIATED WITH TYPE 2 DIABETES MELLITUS, WITH MUSCLE INVOLVEMENT WITHOUT EVIDENCE OF NECROSIS (HCC): ICD-10-CM

## 2021-09-03 DIAGNOSIS — E11.621 DIABETIC ULCER OF LEFT HEEL ASSOCIATED WITH TYPE 2 DIABETES MELLITUS, WITH NECROSIS OF BONE (HCC): ICD-10-CM

## 2021-09-03 DIAGNOSIS — E11.621 DIABETIC ULCER OF RIGHT MIDFOOT ASSOCIATED WITH TYPE 2 DIABETES MELLITUS, WITH MUSCLE INVOLVEMENT WITHOUT EVIDENCE OF NECROSIS (HCC): ICD-10-CM

## 2021-09-03 PROCEDURE — 96365 THER/PROPH/DIAG IV INF INIT: CPT

## 2021-09-03 PROCEDURE — G0463 HOSPITAL OUTPT CLINIC VISIT: HCPCS

## 2021-09-03 PROCEDURE — 25010000002 ERTAPENEM PER 500 MG: Performed by: EMERGENCY MEDICINE

## 2021-09-03 PROCEDURE — 97602 WOUND(S) CARE NON-SELECTIVE: CPT

## 2021-09-03 RX ADMIN — ERTAPENEM 1 G: 1 INJECTION INTRAMUSCULAR; INTRAVENOUS at 10:46

## 2021-09-04 ENCOUNTER — HOSPITAL ENCOUNTER (OUTPATIENT)
Dept: INFUSION THERAPY | Facility: HOSPITAL | Age: 61
Setting detail: INFUSION SERIES
Discharge: HOME | End: 2021-09-04

## 2021-09-04 VITALS
RESPIRATION RATE: 16 BRPM | OXYGEN SATURATION: 95 % | HEART RATE: 66 BPM | DIASTOLIC BLOOD PRESSURE: 63 MMHG | SYSTOLIC BLOOD PRESSURE: 104 MMHG | TEMPERATURE: 98.1 F

## 2021-09-04 DIAGNOSIS — E11.621 DIABETIC ULCER OF LEFT HEEL ASSOCIATED WITH TYPE 2 DIABETES MELLITUS, WITH NECROSIS OF BONE (HCC): ICD-10-CM

## 2021-09-04 DIAGNOSIS — L97.424 DIABETIC ULCER OF LEFT HEEL ASSOCIATED WITH TYPE 2 DIABETES MELLITUS, WITH NECROSIS OF BONE (HCC): ICD-10-CM

## 2021-09-04 DIAGNOSIS — M86.172 ACUTE OSTEOMYELITIS OF LEFT CALCANEUS (HCC): Primary | ICD-10-CM

## 2021-09-04 DIAGNOSIS — E11.621 DIABETIC ULCER OF RIGHT MIDFOOT ASSOCIATED WITH TYPE 2 DIABETES MELLITUS, WITH MUSCLE INVOLVEMENT WITHOUT EVIDENCE OF NECROSIS (HCC): ICD-10-CM

## 2021-09-04 DIAGNOSIS — L97.415 DIABETIC ULCER OF RIGHT MIDFOOT ASSOCIATED WITH TYPE 2 DIABETES MELLITUS, WITH MUSCLE INVOLVEMENT WITHOUT EVIDENCE OF NECROSIS (HCC): ICD-10-CM

## 2021-09-04 PROCEDURE — 96365 THER/PROPH/DIAG IV INF INIT: CPT

## 2021-09-04 PROCEDURE — 25010000002 ERTAPENEM PER 500 MG: Performed by: EMERGENCY MEDICINE

## 2021-09-04 PROCEDURE — 97602 WOUND(S) CARE NON-SELECTIVE: CPT

## 2021-09-04 PROCEDURE — G0463 HOSPITAL OUTPT CLINIC VISIT: HCPCS

## 2021-09-04 RX ADMIN — ERTAPENEM 1 G: 1 INJECTION INTRAMUSCULAR; INTRAVENOUS at 11:20

## 2021-09-05 ENCOUNTER — HOSPITAL ENCOUNTER (OUTPATIENT)
Dept: INFUSION THERAPY | Facility: HOSPITAL | Age: 61
Setting detail: INFUSION SERIES
Discharge: HOME | End: 2021-09-05

## 2021-09-05 VITALS
SYSTOLIC BLOOD PRESSURE: 106 MMHG | TEMPERATURE: 98.2 F | DIASTOLIC BLOOD PRESSURE: 54 MMHG | HEART RATE: 67 BPM | OXYGEN SATURATION: 97 % | RESPIRATION RATE: 20 BRPM

## 2021-09-05 DIAGNOSIS — L97.424 DIABETIC ULCER OF LEFT HEEL ASSOCIATED WITH TYPE 2 DIABETES MELLITUS, WITH NECROSIS OF BONE (HCC): ICD-10-CM

## 2021-09-05 DIAGNOSIS — L97.415 DIABETIC ULCER OF RIGHT MIDFOOT ASSOCIATED WITH TYPE 2 DIABETES MELLITUS, WITH MUSCLE INVOLVEMENT WITHOUT EVIDENCE OF NECROSIS (HCC): ICD-10-CM

## 2021-09-05 DIAGNOSIS — E11.621 DIABETIC ULCER OF RIGHT MIDFOOT ASSOCIATED WITH TYPE 2 DIABETES MELLITUS, WITH MUSCLE INVOLVEMENT WITHOUT EVIDENCE OF NECROSIS (HCC): ICD-10-CM

## 2021-09-05 DIAGNOSIS — M86.172 ACUTE OSTEOMYELITIS OF LEFT CALCANEUS (HCC): Primary | ICD-10-CM

## 2021-09-05 DIAGNOSIS — E11.621 DIABETIC ULCER OF LEFT HEEL ASSOCIATED WITH TYPE 2 DIABETES MELLITUS, WITH NECROSIS OF BONE (HCC): ICD-10-CM

## 2021-09-05 PROCEDURE — G0463 HOSPITAL OUTPT CLINIC VISIT: HCPCS

## 2021-09-05 PROCEDURE — 25010000002 ERTAPENEM PER 500 MG: Performed by: EMERGENCY MEDICINE

## 2021-09-05 PROCEDURE — 97602 WOUND(S) CARE NON-SELECTIVE: CPT

## 2021-09-05 PROCEDURE — 96365 THER/PROPH/DIAG IV INF INIT: CPT

## 2021-09-05 RX ADMIN — ERTAPENEM 1 G: 1 INJECTION INTRAMUSCULAR; INTRAVENOUS at 11:07

## 2021-09-06 ENCOUNTER — HOSPITAL ENCOUNTER (OUTPATIENT)
Dept: INFUSION THERAPY | Facility: HOSPITAL | Age: 61
Setting detail: INFUSION SERIES
Discharge: HOME | End: 2021-09-06

## 2021-09-06 VITALS
HEART RATE: 63 BPM | DIASTOLIC BLOOD PRESSURE: 67 MMHG | TEMPERATURE: 98.4 F | RESPIRATION RATE: 20 BRPM | SYSTOLIC BLOOD PRESSURE: 115 MMHG | OXYGEN SATURATION: 97 %

## 2021-09-06 DIAGNOSIS — L97.424 DIABETIC ULCER OF LEFT HEEL ASSOCIATED WITH TYPE 2 DIABETES MELLITUS, WITH NECROSIS OF BONE (HCC): ICD-10-CM

## 2021-09-06 DIAGNOSIS — E11.621 DIABETIC ULCER OF RIGHT MIDFOOT ASSOCIATED WITH TYPE 2 DIABETES MELLITUS, WITH MUSCLE INVOLVEMENT WITHOUT EVIDENCE OF NECROSIS (HCC): ICD-10-CM

## 2021-09-06 DIAGNOSIS — M86.172 ACUTE OSTEOMYELITIS OF LEFT CALCANEUS (HCC): Primary | ICD-10-CM

## 2021-09-06 DIAGNOSIS — L97.415 DIABETIC ULCER OF RIGHT MIDFOOT ASSOCIATED WITH TYPE 2 DIABETES MELLITUS, WITH MUSCLE INVOLVEMENT WITHOUT EVIDENCE OF NECROSIS (HCC): ICD-10-CM

## 2021-09-06 DIAGNOSIS — E11.621 DIABETIC ULCER OF LEFT HEEL ASSOCIATED WITH TYPE 2 DIABETES MELLITUS, WITH NECROSIS OF BONE (HCC): ICD-10-CM

## 2021-09-06 PROCEDURE — 25010000002 ERTAPENEM PER 500 MG: Performed by: EMERGENCY MEDICINE

## 2021-09-06 PROCEDURE — 96365 THER/PROPH/DIAG IV INF INIT: CPT

## 2021-09-06 PROCEDURE — G0463 HOSPITAL OUTPT CLINIC VISIT: HCPCS

## 2021-09-06 RX ADMIN — ERTAPENEM SODIUM 1 G: 1 INJECTION, POWDER, LYOPHILIZED, FOR SOLUTION INTRAMUSCULAR; INTRAVENOUS at 10:50

## 2021-09-07 ENCOUNTER — HOSPITAL ENCOUNTER (OUTPATIENT)
Dept: INFUSION THERAPY | Facility: HOSPITAL | Age: 61
Setting detail: INFUSION SERIES
Discharge: HOME | End: 2021-09-07

## 2021-09-07 VITALS
RESPIRATION RATE: 20 BRPM | OXYGEN SATURATION: 96 % | TEMPERATURE: 98.4 F | HEART RATE: 69 BPM | DIASTOLIC BLOOD PRESSURE: 63 MMHG | SYSTOLIC BLOOD PRESSURE: 108 MMHG

## 2021-09-07 DIAGNOSIS — M86.172 ACUTE OSTEOMYELITIS OF LEFT CALCANEUS (HCC): Primary | ICD-10-CM

## 2021-09-07 DIAGNOSIS — E11.621 DIABETIC ULCER OF LEFT HEEL ASSOCIATED WITH TYPE 2 DIABETES MELLITUS, WITH NECROSIS OF BONE (HCC): ICD-10-CM

## 2021-09-07 DIAGNOSIS — L97.415 DIABETIC ULCER OF RIGHT MIDFOOT ASSOCIATED WITH TYPE 2 DIABETES MELLITUS, WITH MUSCLE INVOLVEMENT WITHOUT EVIDENCE OF NECROSIS (HCC): ICD-10-CM

## 2021-09-07 DIAGNOSIS — E11.621 DIABETIC ULCER OF RIGHT MIDFOOT ASSOCIATED WITH TYPE 2 DIABETES MELLITUS, WITH MUSCLE INVOLVEMENT WITHOUT EVIDENCE OF NECROSIS (HCC): ICD-10-CM

## 2021-09-07 DIAGNOSIS — L97.424 DIABETIC ULCER OF LEFT HEEL ASSOCIATED WITH TYPE 2 DIABETES MELLITUS, WITH NECROSIS OF BONE (HCC): ICD-10-CM

## 2021-09-07 PROCEDURE — 96365 THER/PROPH/DIAG IV INF INIT: CPT

## 2021-09-07 PROCEDURE — 25010000002 ERTAPENEM PER 500 MG: Performed by: EMERGENCY MEDICINE

## 2021-09-07 PROCEDURE — G0463 HOSPITAL OUTPT CLINIC VISIT: HCPCS

## 2021-09-07 RX ADMIN — ERTAPENEM 1 G: 1 INJECTION INTRAMUSCULAR; INTRAVENOUS at 11:27

## 2021-09-08 ENCOUNTER — HOSPITAL ENCOUNTER (OUTPATIENT)
Dept: INFUSION THERAPY | Facility: HOSPITAL | Age: 61
Setting detail: INFUSION SERIES
Discharge: HOME | End: 2021-09-08

## 2021-09-08 VITALS
RESPIRATION RATE: 20 BRPM | SYSTOLIC BLOOD PRESSURE: 133 MMHG | TEMPERATURE: 97.8 F | HEART RATE: 64 BPM | DIASTOLIC BLOOD PRESSURE: 74 MMHG | OXYGEN SATURATION: 98 %

## 2021-09-08 DIAGNOSIS — E11.621 DIABETIC ULCER OF LEFT HEEL ASSOCIATED WITH TYPE 2 DIABETES MELLITUS, WITH NECROSIS OF BONE (HCC): ICD-10-CM

## 2021-09-08 DIAGNOSIS — L97.424 DIABETIC ULCER OF LEFT HEEL ASSOCIATED WITH TYPE 2 DIABETES MELLITUS, WITH NECROSIS OF BONE (HCC): ICD-10-CM

## 2021-09-08 DIAGNOSIS — M86.172 ACUTE OSTEOMYELITIS OF LEFT CALCANEUS (HCC): Primary | ICD-10-CM

## 2021-09-08 PROCEDURE — 25010000002 ERTAPENEM PER 500 MG: Performed by: EMERGENCY MEDICINE

## 2021-09-08 PROCEDURE — 96365 THER/PROPH/DIAG IV INF INIT: CPT

## 2021-09-08 RX ADMIN — ERTAPENEM SODIUM 1 G: 1 INJECTION, POWDER, LYOPHILIZED, FOR SOLUTION INTRAMUSCULAR; INTRAVENOUS at 11:05

## 2021-09-09 ENCOUNTER — APPOINTMENT (OUTPATIENT)
Dept: INFUSION THERAPY | Facility: HOSPITAL | Age: 61
End: 2021-09-09

## 2021-09-09 ENCOUNTER — TELEPHONE (OUTPATIENT)
Dept: WOUND CARE | Facility: HOSPITAL | Age: 61
End: 2021-09-09

## 2021-09-09 ENCOUNTER — HOSPITAL ENCOUNTER (OUTPATIENT)
Dept: INFUSION THERAPY | Facility: HOSPITAL | Age: 61
Setting detail: INFUSION SERIES
Discharge: HOME | End: 2021-09-09

## 2021-09-09 VITALS
DIASTOLIC BLOOD PRESSURE: 68 MMHG | SYSTOLIC BLOOD PRESSURE: 117 MMHG | HEART RATE: 62 BPM | OXYGEN SATURATION: 97 % | TEMPERATURE: 98.3 F | RESPIRATION RATE: 16 BRPM

## 2021-09-09 DIAGNOSIS — E11.621 DIABETIC ULCER OF RIGHT MIDFOOT ASSOCIATED WITH TYPE 2 DIABETES MELLITUS, WITH MUSCLE INVOLVEMENT WITHOUT EVIDENCE OF NECROSIS (HCC): ICD-10-CM

## 2021-09-09 DIAGNOSIS — E11.621 DIABETIC ULCER OF LEFT HEEL ASSOCIATED WITH TYPE 2 DIABETES MELLITUS, WITH NECROSIS OF BONE (HCC): ICD-10-CM

## 2021-09-09 DIAGNOSIS — M86.172 ACUTE OSTEOMYELITIS OF LEFT CALCANEUS (HCC): Primary | ICD-10-CM

## 2021-09-09 DIAGNOSIS — L97.424 DIABETIC ULCER OF LEFT HEEL ASSOCIATED WITH TYPE 2 DIABETES MELLITUS, WITH NECROSIS OF BONE (HCC): ICD-10-CM

## 2021-09-09 DIAGNOSIS — L97.415 DIABETIC ULCER OF RIGHT MIDFOOT ASSOCIATED WITH TYPE 2 DIABETES MELLITUS, WITH MUSCLE INVOLVEMENT WITHOUT EVIDENCE OF NECROSIS (HCC): ICD-10-CM

## 2021-09-09 PROCEDURE — 96365 THER/PROPH/DIAG IV INF INIT: CPT

## 2021-09-09 PROCEDURE — 25010000002 ERTAPENEM PER 500 MG: Performed by: EMERGENCY MEDICINE

## 2021-09-09 PROCEDURE — 97602 WOUND(S) CARE NON-SELECTIVE: CPT

## 2021-09-09 PROCEDURE — G0463 HOSPITAL OUTPT CLINIC VISIT: HCPCS

## 2021-09-09 RX ADMIN — ERTAPENEM SODIUM 1 G: 1 INJECTION, POWDER, LYOPHILIZED, FOR SOLUTION INTRAMUSCULAR; INTRAVENOUS at 10:50

## 2021-09-10 ENCOUNTER — HOSPITAL ENCOUNTER (OUTPATIENT)
Dept: INFUSION THERAPY | Facility: HOSPITAL | Age: 61
Setting detail: INFUSION SERIES
Discharge: HOME | End: 2021-09-10

## 2021-09-10 VITALS
SYSTOLIC BLOOD PRESSURE: 114 MMHG | WEIGHT: 315 LBS | BODY MASS INDEX: 40.43 KG/M2 | HEIGHT: 74 IN | DIASTOLIC BLOOD PRESSURE: 56 MMHG | TEMPERATURE: 98.3 F | HEART RATE: 80 BPM | OXYGEN SATURATION: 96 % | RESPIRATION RATE: 18 BRPM

## 2021-09-10 DIAGNOSIS — E11.621 DIABETIC ULCER OF LEFT HEEL ASSOCIATED WITH TYPE 2 DIABETES MELLITUS, WITH NECROSIS OF BONE (HCC): ICD-10-CM

## 2021-09-10 DIAGNOSIS — L97.415 DIABETIC ULCER OF RIGHT MIDFOOT ASSOCIATED WITH TYPE 2 DIABETES MELLITUS, WITH MUSCLE INVOLVEMENT WITHOUT EVIDENCE OF NECROSIS (HCC): ICD-10-CM

## 2021-09-10 DIAGNOSIS — E11.621 DIABETIC ULCER OF RIGHT MIDFOOT ASSOCIATED WITH TYPE 2 DIABETES MELLITUS, WITH MUSCLE INVOLVEMENT WITHOUT EVIDENCE OF NECROSIS (HCC): ICD-10-CM

## 2021-09-10 DIAGNOSIS — M86.172 ACUTE OSTEOMYELITIS OF LEFT CALCANEUS (HCC): Primary | ICD-10-CM

## 2021-09-10 DIAGNOSIS — L97.424 DIABETIC ULCER OF LEFT HEEL ASSOCIATED WITH TYPE 2 DIABETES MELLITUS, WITH NECROSIS OF BONE (HCC): ICD-10-CM

## 2021-09-10 PROCEDURE — 87350 HEPATITIS BE AG IA: CPT | Performed by: INTERNAL MEDICINE

## 2021-09-10 PROCEDURE — 96365 THER/PROPH/DIAG IV INF INIT: CPT

## 2021-09-10 PROCEDURE — 86707 HEPATITIS BE ANTIBODY: CPT | Performed by: INTERNAL MEDICINE

## 2021-09-10 PROCEDURE — 86704 HEP B CORE ANTIBODY TOTAL: CPT | Performed by: INTERNAL MEDICINE

## 2021-09-10 PROCEDURE — G0463 HOSPITAL OUTPT CLINIC VISIT: HCPCS

## 2021-09-10 PROCEDURE — 25010000002 ERTAPENEM PER 500 MG: Performed by: EMERGENCY MEDICINE

## 2021-09-10 PROCEDURE — 86706 HEP B SURFACE ANTIBODY: CPT | Performed by: INTERNAL MEDICINE

## 2021-09-10 PROCEDURE — 86705 HEP B CORE ANTIBODY IGM: CPT | Performed by: INTERNAL MEDICINE

## 2021-09-10 PROCEDURE — 97602 WOUND(S) CARE NON-SELECTIVE: CPT

## 2021-09-10 RX ADMIN — ERTAPENEM SODIUM 1 G: 1 INJECTION, POWDER, LYOPHILIZED, FOR SOLUTION INTRAMUSCULAR; INTRAVENOUS at 10:46

## 2021-09-11 ENCOUNTER — LAB REQUISITION (OUTPATIENT)
Dept: LAB | Facility: HOSPITAL | Age: 61
End: 2021-09-11

## 2021-09-11 ENCOUNTER — HOSPITAL ENCOUNTER (OUTPATIENT)
Dept: INFUSION THERAPY | Facility: HOSPITAL | Age: 61
Setting detail: INFUSION SERIES
Discharge: HOME | End: 2021-09-11

## 2021-09-11 VITALS
SYSTOLIC BLOOD PRESSURE: 136 MMHG | RESPIRATION RATE: 20 BRPM | HEART RATE: 80 BPM | OXYGEN SATURATION: 99 % | DIASTOLIC BLOOD PRESSURE: 90 MMHG | TEMPERATURE: 97.8 F

## 2021-09-11 DIAGNOSIS — J30.1 ALLERGIC RHINITIS DUE TO POLLEN: ICD-10-CM

## 2021-09-11 DIAGNOSIS — I73.9 PERIPHERAL VASCULAR DISEASE, UNSPECIFIED (HCC): ICD-10-CM

## 2021-09-11 DIAGNOSIS — E78.5 HYPERLIPIDEMIA, UNSPECIFIED: ICD-10-CM

## 2021-09-11 DIAGNOSIS — L97.415 DIABETIC ULCER OF RIGHT MIDFOOT ASSOCIATED WITH TYPE 2 DIABETES MELLITUS, WITH MUSCLE INVOLVEMENT WITHOUT EVIDENCE OF NECROSIS (HCC): ICD-10-CM

## 2021-09-11 DIAGNOSIS — G63 POLYNEUROPATHY IN DISEASES CLASSIFIED ELSEWHERE (HCC): ICD-10-CM

## 2021-09-11 DIAGNOSIS — I10 ESSENTIAL (PRIMARY) HYPERTENSION: ICD-10-CM

## 2021-09-11 DIAGNOSIS — E11.621 DIABETIC ULCER OF LEFT HEEL ASSOCIATED WITH TYPE 2 DIABETES MELLITUS, WITH NECROSIS OF BONE (HCC): ICD-10-CM

## 2021-09-11 DIAGNOSIS — E11.621 DIABETIC ULCER OF RIGHT MIDFOOT ASSOCIATED WITH TYPE 2 DIABETES MELLITUS, WITH MUSCLE INVOLVEMENT WITHOUT EVIDENCE OF NECROSIS (HCC): ICD-10-CM

## 2021-09-11 DIAGNOSIS — L97.424 DIABETIC ULCER OF LEFT HEEL ASSOCIATED WITH TYPE 2 DIABETES MELLITUS, WITH NECROSIS OF BONE (HCC): ICD-10-CM

## 2021-09-11 DIAGNOSIS — I70.25 ATHEROSCLEROSIS OF NATIVE ARTERIES OF OTHER EXTREMITIES WITH ULCERATION (HCC): ICD-10-CM

## 2021-09-11 DIAGNOSIS — D69.6 THROMBOCYTOPENIA, UNSPECIFIED (HCC): ICD-10-CM

## 2021-09-11 DIAGNOSIS — E13.9 OTHER SPECIFIED DIABETES MELLITUS WITHOUT COMPLICATIONS (HCC): ICD-10-CM

## 2021-09-11 DIAGNOSIS — M86.172 ACUTE OSTEOMYELITIS OF LEFT CALCANEUS (HCC): Primary | ICD-10-CM

## 2021-09-11 DIAGNOSIS — E11.610 TYPE 2 DIABETES MELLITUS WITH DIABETIC NEUROPATHIC ARTHROPATHY (HCC): ICD-10-CM

## 2021-09-11 DIAGNOSIS — K76.0 FATTY (CHANGE OF) LIVER, NOT ELSEWHERE CLASSIFIED: ICD-10-CM

## 2021-09-11 DIAGNOSIS — E55.9 VITAMIN D DEFICIENCY, UNSPECIFIED: ICD-10-CM

## 2021-09-11 PROCEDURE — 87340 HEPATITIS B SURFACE AG IA: CPT | Performed by: INTERNAL MEDICINE

## 2021-09-11 PROCEDURE — G0463 HOSPITAL OUTPT CLINIC VISIT: HCPCS

## 2021-09-11 PROCEDURE — 97602 WOUND(S) CARE NON-SELECTIVE: CPT

## 2021-09-11 PROCEDURE — 96365 THER/PROPH/DIAG IV INF INIT: CPT

## 2021-09-11 PROCEDURE — 25010000002 ERTAPENEM PER 500 MG: Performed by: EMERGENCY MEDICINE

## 2021-09-11 RX ADMIN — ERTAPENEM 1 G: 1 INJECTION INTRAMUSCULAR; INTRAVENOUS at 11:30

## 2021-09-12 ENCOUNTER — HOSPITAL ENCOUNTER (OUTPATIENT)
Dept: INFUSION THERAPY | Facility: HOSPITAL | Age: 61
Setting detail: INFUSION SERIES
Discharge: HOME | End: 2021-09-12

## 2021-09-12 VITALS
HEART RATE: 90 BPM | RESPIRATION RATE: 18 BRPM | OXYGEN SATURATION: 99 % | TEMPERATURE: 97.9 F | SYSTOLIC BLOOD PRESSURE: 136 MMHG | DIASTOLIC BLOOD PRESSURE: 80 MMHG

## 2021-09-12 DIAGNOSIS — M86.172 ACUTE OSTEOMYELITIS OF LEFT CALCANEUS (HCC): Primary | ICD-10-CM

## 2021-09-12 DIAGNOSIS — L97.415 DIABETIC ULCER OF RIGHT MIDFOOT ASSOCIATED WITH TYPE 2 DIABETES MELLITUS, WITH MUSCLE INVOLVEMENT WITHOUT EVIDENCE OF NECROSIS (HCC): ICD-10-CM

## 2021-09-12 DIAGNOSIS — E11.621 DIABETIC ULCER OF LEFT HEEL ASSOCIATED WITH TYPE 2 DIABETES MELLITUS, WITH NECROSIS OF BONE (HCC): ICD-10-CM

## 2021-09-12 DIAGNOSIS — E11.621 DIABETIC ULCER OF RIGHT MIDFOOT ASSOCIATED WITH TYPE 2 DIABETES MELLITUS, WITH MUSCLE INVOLVEMENT WITHOUT EVIDENCE OF NECROSIS (HCC): ICD-10-CM

## 2021-09-12 DIAGNOSIS — L97.424 DIABETIC ULCER OF LEFT HEEL ASSOCIATED WITH TYPE 2 DIABETES MELLITUS, WITH NECROSIS OF BONE (HCC): ICD-10-CM

## 2021-09-12 LAB
HBV CORE IGM SERPL QL IA: NORMAL
HBV SURFACE AB SER RIA-ACNC: NORMAL
HBV SURFACE AG SERPL QL IA: NORMAL

## 2021-09-12 PROCEDURE — 96365 THER/PROPH/DIAG IV INF INIT: CPT

## 2021-09-12 PROCEDURE — 25010000002 ERTAPENEM PER 500 MG: Performed by: EMERGENCY MEDICINE

## 2021-09-12 PROCEDURE — G0463 HOSPITAL OUTPT CLINIC VISIT: HCPCS

## 2021-09-12 PROCEDURE — 97602 WOUND(S) CARE NON-SELECTIVE: CPT

## 2021-09-12 RX ADMIN — ERTAPENEM 1 G: 1 INJECTION INTRAMUSCULAR; INTRAVENOUS at 10:17

## 2021-09-13 ENCOUNTER — HOSPITAL ENCOUNTER (OUTPATIENT)
Dept: INFUSION THERAPY | Facility: HOSPITAL | Age: 61
Discharge: HOME | End: 2021-09-13
Admitting: EMERGENCY MEDICINE

## 2021-09-13 ENCOUNTER — TELEPHONE (OUTPATIENT)
Dept: WOUND CARE | Facility: HOSPITAL | Age: 61
End: 2021-09-13

## 2021-09-13 VITALS
RESPIRATION RATE: 20 BRPM | DIASTOLIC BLOOD PRESSURE: 60 MMHG | HEART RATE: 79 BPM | SYSTOLIC BLOOD PRESSURE: 105 MMHG | OXYGEN SATURATION: 98 % | TEMPERATURE: 98.3 F

## 2021-09-13 DIAGNOSIS — L97.424 DIABETIC ULCER OF LEFT HEEL ASSOCIATED WITH TYPE 2 DIABETES MELLITUS, WITH NECROSIS OF BONE (HCC): ICD-10-CM

## 2021-09-13 DIAGNOSIS — E11.621 DIABETIC ULCER OF LEFT HEEL ASSOCIATED WITH TYPE 2 DIABETES MELLITUS, WITH NECROSIS OF BONE (HCC): ICD-10-CM

## 2021-09-13 DIAGNOSIS — L97.415 DIABETIC ULCER OF RIGHT MIDFOOT ASSOCIATED WITH TYPE 2 DIABETES MELLITUS, WITH MUSCLE INVOLVEMENT WITHOUT EVIDENCE OF NECROSIS (HCC): ICD-10-CM

## 2021-09-13 DIAGNOSIS — E11.621 DIABETIC ULCER OF RIGHT MIDFOOT ASSOCIATED WITH TYPE 2 DIABETES MELLITUS, WITH MUSCLE INVOLVEMENT WITHOUT EVIDENCE OF NECROSIS (HCC): ICD-10-CM

## 2021-09-13 DIAGNOSIS — M86.172 ACUTE OSTEOMYELITIS OF LEFT CALCANEUS (HCC): Primary | ICD-10-CM

## 2021-09-13 PROCEDURE — G0463 HOSPITAL OUTPT CLINIC VISIT: HCPCS

## 2021-09-13 PROCEDURE — 97602 WOUND(S) CARE NON-SELECTIVE: CPT

## 2021-09-13 PROCEDURE — 96365 THER/PROPH/DIAG IV INF INIT: CPT

## 2021-09-13 PROCEDURE — 25010000002 ERTAPENEM PER 500 MG: Performed by: EMERGENCY MEDICINE

## 2021-09-13 RX ADMIN — ERTAPENEM 1 G: 1 INJECTION INTRAMUSCULAR; INTRAVENOUS at 11:05

## 2021-09-14 ENCOUNTER — HOSPITAL ENCOUNTER (OUTPATIENT)
Dept: INFUSION THERAPY | Facility: HOSPITAL | Age: 61
Setting detail: INFUSION SERIES
Discharge: HOME | End: 2021-09-14

## 2021-09-14 VITALS
TEMPERATURE: 98.2 F | RESPIRATION RATE: 20 BRPM | HEART RATE: 75 BPM | SYSTOLIC BLOOD PRESSURE: 114 MMHG | OXYGEN SATURATION: 96 % | DIASTOLIC BLOOD PRESSURE: 64 MMHG

## 2021-09-14 DIAGNOSIS — L97.424 DIABETIC ULCER OF LEFT HEEL ASSOCIATED WITH TYPE 2 DIABETES MELLITUS, WITH NECROSIS OF BONE (HCC): ICD-10-CM

## 2021-09-14 DIAGNOSIS — E11.621 DIABETIC ULCER OF LEFT HEEL ASSOCIATED WITH TYPE 2 DIABETES MELLITUS, WITH NECROSIS OF BONE (HCC): ICD-10-CM

## 2021-09-14 DIAGNOSIS — E11.621 DIABETIC ULCER OF RIGHT MIDFOOT ASSOCIATED WITH TYPE 2 DIABETES MELLITUS, WITH MUSCLE INVOLVEMENT WITHOUT EVIDENCE OF NECROSIS (HCC): ICD-10-CM

## 2021-09-14 DIAGNOSIS — L97.415 DIABETIC ULCER OF RIGHT MIDFOOT ASSOCIATED WITH TYPE 2 DIABETES MELLITUS, WITH MUSCLE INVOLVEMENT WITHOUT EVIDENCE OF NECROSIS (HCC): ICD-10-CM

## 2021-09-14 DIAGNOSIS — M86.172 ACUTE OSTEOMYELITIS OF LEFT CALCANEUS (HCC): Primary | ICD-10-CM

## 2021-09-14 LAB
HBV CORE AB SERPL QL IA: NEGATIVE
HBV E AB SERPL QL IA: NEGATIVE
HBV E AG SERPL QL IA: NEGATIVE

## 2021-09-14 PROCEDURE — G0463 HOSPITAL OUTPT CLINIC VISIT: HCPCS

## 2021-09-14 PROCEDURE — 25010000002 ERTAPENEM PER 500 MG: Performed by: EMERGENCY MEDICINE

## 2021-09-14 PROCEDURE — 96365 THER/PROPH/DIAG IV INF INIT: CPT

## 2021-09-14 PROCEDURE — 97602 WOUND(S) CARE NON-SELECTIVE: CPT

## 2021-09-14 RX ADMIN — ERTAPENEM 1 G: 1 INJECTION INTRAMUSCULAR; INTRAVENOUS at 11:02

## 2021-09-15 ENCOUNTER — PROCEDURE VISIT (OUTPATIENT)
Dept: WOUND CARE | Facility: HOSPITAL | Age: 61
End: 2021-09-15

## 2021-09-15 ENCOUNTER — HOSPITAL ENCOUNTER (OUTPATIENT)
Dept: INFUSION THERAPY | Facility: HOSPITAL | Age: 61
Setting detail: INFUSION SERIES
Discharge: HOME | End: 2021-09-15

## 2021-09-15 VITALS
SYSTOLIC BLOOD PRESSURE: 102 MMHG | DIASTOLIC BLOOD PRESSURE: 64 MMHG | HEART RATE: 96 BPM | RESPIRATION RATE: 18 BRPM | TEMPERATURE: 96.8 F

## 2021-09-15 DIAGNOSIS — L97.424 DIABETIC ULCER OF LEFT HEEL ASSOCIATED WITH TYPE 2 DIABETES MELLITUS, WITH NECROSIS OF BONE (HCC): ICD-10-CM

## 2021-09-15 DIAGNOSIS — M86.8X7 OTHER OSTEOMYELITIS OF LEFT FOOT (HCC): ICD-10-CM

## 2021-09-15 DIAGNOSIS — E11.621 DIABETIC ULCER OF LEFT HEEL ASSOCIATED WITH TYPE 2 DIABETES MELLITUS, WITH NECROSIS OF BONE (HCC): ICD-10-CM

## 2021-09-15 DIAGNOSIS — M86.172 ACUTE OSTEOMYELITIS OF LEFT CALCANEUS (HCC): Primary | ICD-10-CM

## 2021-09-15 DIAGNOSIS — E11.621 DIABETIC ULCER OF LEFT HEEL ASSOCIATED WITH TYPE 2 DIABETES MELLITUS, WITH NECROSIS OF BONE (HCC): Primary | ICD-10-CM

## 2021-09-15 DIAGNOSIS — L97.424 DIABETIC ULCER OF LEFT HEEL ASSOCIATED WITH TYPE 2 DIABETES MELLITUS, WITH NECROSIS OF BONE (HCC): Primary | ICD-10-CM

## 2021-09-15 LAB
GLUCOSE BLDC GLUCOMTR-MCNC: 170 MG/DL (ref 70–99)
GLUCOSE BLDC GLUCOMTR-MCNC: 190 MG/DL (ref 70–99)

## 2021-09-15 PROCEDURE — 82962 GLUCOSE BLOOD TEST: CPT

## 2021-09-15 PROCEDURE — 99183 HYPERBARIC OXYGEN THERAPY: CPT | Performed by: EMERGENCY MEDICINE

## 2021-09-15 PROCEDURE — G0277 HBOT, FULL BODY CHAMBER, 30M: HCPCS

## 2021-09-15 NOTE — PROCEDURES
Vitals:    09/15/21 1310 09/15/21 1315   BP: 114/64  Comment: pre treatment Comment: pre treatment   BP Location: Left arm    Patient Position: Sitting    Pulse: 95    Resp: 16    Temp: 97.2 °F (36.2 °C)    TempSrc: Temporal    PainSc: 0-No pain         Procedure   WOUND CARE HYPERBARIC  Performed by: Shea Daniels MD  Authorized by: Shea Daniels MD     Treatment number: 1   Mobility  The following were used for the patient's mobility: ambulatory  The patient appears alert and oriented  The lungs are clear.   TM's:     Right Ear:     -Intact      Left Ear:     -Intact  Pre-treatment Actions   The patient was voided/has a catheter.    Safety   A grounding device was attached.  The safety checklist was reviewed.   We identified the patient.  We identified the patient using the following methods: Photo ID and date of birth.    Treatment Profile: 2.0   The total time of the procedure was 92 minutes.   Pressure    The patient's Leave Surface Rate was 10:08 PSI / minutes. The patient's Reach Bottom was 10:18 minutes. The patient's Leave Bottom Rate was 11:30 PSI / minutes. The patient's Return Surface was 11:40 minutes.   Post-Treatment Vital Signs and Assessment   Lungs:  Clear  Right TMs:  Intact  Left TMs:  Intact           Results for orders placed or performed in visit on 09/15/21   1. POC Glucose (Collected: 9/15/2021  3:22 PM)    Specimen: Blood   Result Value Ref Range    Glucose 170 (H) 70 - 99 mg/dL

## 2021-09-15 NOTE — PROGRESS NOTES
Patient tolerated HBO treatment well and there were no complications.  I was present throughout the duration of the treatment for direct supervision.

## 2021-09-16 ENCOUNTER — PROCEDURE VISIT (OUTPATIENT)
Dept: WOUND CARE | Facility: HOSPITAL | Age: 61
End: 2021-09-16

## 2021-09-16 ENCOUNTER — HOSPITAL ENCOUNTER (OUTPATIENT)
Dept: INFUSION THERAPY | Facility: HOSPITAL | Age: 61
Setting detail: INFUSION SERIES
Discharge: HOME | End: 2021-09-16

## 2021-09-16 VITALS
WEIGHT: 315 LBS | HEART RATE: 68 BPM | DIASTOLIC BLOOD PRESSURE: 60 MMHG | SYSTOLIC BLOOD PRESSURE: 119 MMHG | OXYGEN SATURATION: 99 % | RESPIRATION RATE: 20 BRPM | TEMPERATURE: 98.2 F | BODY MASS INDEX: 40.43 KG/M2 | HEIGHT: 74 IN

## 2021-09-16 DIAGNOSIS — L97.424 DIABETIC ULCER OF LEFT HEEL ASSOCIATED WITH TYPE 2 DIABETES MELLITUS, WITH NECROSIS OF BONE (HCC): Primary | ICD-10-CM

## 2021-09-16 DIAGNOSIS — E11.621 DIABETIC ULCER OF RIGHT MIDFOOT ASSOCIATED WITH TYPE 2 DIABETES MELLITUS, WITH MUSCLE INVOLVEMENT WITHOUT EVIDENCE OF NECROSIS (HCC): ICD-10-CM

## 2021-09-16 DIAGNOSIS — E11.621 DIABETIC ULCER OF LEFT HEEL ASSOCIATED WITH TYPE 2 DIABETES MELLITUS, WITH NECROSIS OF BONE (HCC): Primary | ICD-10-CM

## 2021-09-16 DIAGNOSIS — M86.8X7 OTHER OSTEOMYELITIS OF LEFT FOOT (HCC): ICD-10-CM

## 2021-09-16 DIAGNOSIS — E11.621 DIABETIC ULCER OF LEFT HEEL ASSOCIATED WITH TYPE 2 DIABETES MELLITUS, WITH NECROSIS OF BONE (HCC): ICD-10-CM

## 2021-09-16 DIAGNOSIS — L97.415 DIABETIC ULCER OF RIGHT MIDFOOT ASSOCIATED WITH TYPE 2 DIABETES MELLITUS, WITH MUSCLE INVOLVEMENT WITHOUT EVIDENCE OF NECROSIS (HCC): ICD-10-CM

## 2021-09-16 DIAGNOSIS — L97.424 DIABETIC ULCER OF LEFT HEEL ASSOCIATED WITH TYPE 2 DIABETES MELLITUS, WITH NECROSIS OF BONE (HCC): ICD-10-CM

## 2021-09-16 DIAGNOSIS — M86.172 ACUTE OSTEOMYELITIS OF LEFT CALCANEUS (HCC): Primary | ICD-10-CM

## 2021-09-16 LAB
GLUCOSE BLDC GLUCOMTR-MCNC: 116 MG/DL (ref 70–99)
GLUCOSE BLDC GLUCOMTR-MCNC: 96 MG/DL (ref 70–99)
GLUCOSE BLDC GLUCOMTR-MCNC: 99 MG/DL (ref 70–99)

## 2021-09-16 PROCEDURE — G0277 HBOT, FULL BODY CHAMBER, 30M: HCPCS

## 2021-09-16 PROCEDURE — 99183 HYPERBARIC OXYGEN THERAPY: CPT | Performed by: EMERGENCY MEDICINE

## 2021-09-16 PROCEDURE — 82962 GLUCOSE BLOOD TEST: CPT | Performed by: EMERGENCY MEDICINE

## 2021-09-16 PROCEDURE — G0463 HOSPITAL OUTPT CLINIC VISIT: HCPCS

## 2021-09-16 PROCEDURE — 25010000002 ERTAPENEM PER 500 MG: Performed by: EMERGENCY MEDICINE

## 2021-09-16 PROCEDURE — 96365 THER/PROPH/DIAG IV INF INIT: CPT

## 2021-09-16 RX ADMIN — ERTAPENEM 1 G: 1 INJECTION INTRAMUSCULAR; INTRAVENOUS at 10:47

## 2021-09-16 NOTE — PROCEDURES
There were no vitals filed for this visit.     Procedure   WOUND CARE HYPERBARIC  Performed by: Shea Daniels MD  Authorized by: Shea Daniels MD     Treatment number: 2   Mobility  The following were used for the patient's mobility: ambulatory  The patient appears alert and oriented  The lungs are clear.   TM's:     Left Ear:     -Intact  Pre-treatment Actions   The patient was voided/has a catheter.    Safety   A grounding device was attached.  We identified the patient.  We identified the patient using the following methods: Photo ID, verbally and date of birth.    Treatment Profile: 2.0   The total time of the procedure was 110 minutes.   Pressure    The patient's Leave Surface Rate was 12:59 PSI / minutes. The patient's Reach Bottom was 1:09 minutes. The patient's Leave Bottom Rate was 2:39 PSI / minutes. The patient's Return Surface was 2:49 minutes.   Post-Treatment Vital Signs and Assessment   Lungs:  Clear  Right TMs:  Intact  Left TMs:  Intact           Results for orders placed or performed in visit on 09/16/21   1. POC Glucose (Collected: 9/16/2021 12:46 PM)    Specimen: Blood   Result Value Ref Range    Glucose 116 (H) 70 - 99 mg/dL

## 2021-09-17 ENCOUNTER — HOSPITAL ENCOUNTER (OUTPATIENT)
Dept: INFUSION THERAPY | Facility: HOSPITAL | Age: 61
Setting detail: INFUSION SERIES
Discharge: HOME | End: 2021-09-17

## 2021-09-17 ENCOUNTER — PROCEDURE VISIT (OUTPATIENT)
Dept: WOUND CARE | Facility: HOSPITAL | Age: 61
End: 2021-09-17

## 2021-09-17 VITALS
SYSTOLIC BLOOD PRESSURE: 108 MMHG | HEART RATE: 82 BPM | DIASTOLIC BLOOD PRESSURE: 66 MMHG | RESPIRATION RATE: 16 BRPM | TEMPERATURE: 98 F

## 2021-09-17 VITALS
RESPIRATION RATE: 20 BRPM | SYSTOLIC BLOOD PRESSURE: 117 MMHG | OXYGEN SATURATION: 97 % | DIASTOLIC BLOOD PRESSURE: 57 MMHG | HEART RATE: 73 BPM | TEMPERATURE: 98.3 F

## 2021-09-17 DIAGNOSIS — L97.424 DIABETIC ULCER OF LEFT HEEL ASSOCIATED WITH TYPE 2 DIABETES MELLITUS, WITH NECROSIS OF BONE (HCC): ICD-10-CM

## 2021-09-17 DIAGNOSIS — L97.415 DIABETIC ULCER OF RIGHT MIDFOOT ASSOCIATED WITH TYPE 2 DIABETES MELLITUS, WITH MUSCLE INVOLVEMENT WITHOUT EVIDENCE OF NECROSIS (HCC): ICD-10-CM

## 2021-09-17 DIAGNOSIS — E11.621 DIABETIC ULCER OF RIGHT MIDFOOT ASSOCIATED WITH TYPE 2 DIABETES MELLITUS, WITH MUSCLE INVOLVEMENT WITHOUT EVIDENCE OF NECROSIS (HCC): ICD-10-CM

## 2021-09-17 DIAGNOSIS — M86.8X7 OTHER OSTEOMYELITIS OF LEFT FOOT (HCC): ICD-10-CM

## 2021-09-17 DIAGNOSIS — M86.172 ACUTE OSTEOMYELITIS OF LEFT CALCANEUS (HCC): Primary | ICD-10-CM

## 2021-09-17 DIAGNOSIS — E11.621 DIABETIC ULCER OF LEFT HEEL ASSOCIATED WITH TYPE 2 DIABETES MELLITUS, WITH NECROSIS OF BONE (HCC): ICD-10-CM

## 2021-09-17 LAB
GLUCOSE BLDC GLUCOMTR-MCNC: 223 MG/DL (ref 70–99)
GLUCOSE BLDC GLUCOMTR-MCNC: 239 MG/DL (ref 70–99)

## 2021-09-17 PROCEDURE — 99183 HYPERBARIC OXYGEN THERAPY: CPT | Performed by: SURGERY

## 2021-09-17 PROCEDURE — G0463 HOSPITAL OUTPT CLINIC VISIT: HCPCS

## 2021-09-17 PROCEDURE — 82962 GLUCOSE BLOOD TEST: CPT | Performed by: SURGERY

## 2021-09-17 PROCEDURE — 97602 WOUND(S) CARE NON-SELECTIVE: CPT

## 2021-09-17 PROCEDURE — 96365 THER/PROPH/DIAG IV INF INIT: CPT

## 2021-09-17 PROCEDURE — G0277 HBOT, FULL BODY CHAMBER, 30M: HCPCS

## 2021-09-17 PROCEDURE — 25010000002 ERTAPENEM PER 500 MG: Performed by: EMERGENCY MEDICINE

## 2021-09-17 RX ADMIN — ERTAPENEM 1 G: 1 INJECTION INTRAMUSCULAR; INTRAVENOUS at 11:45

## 2021-09-17 NOTE — PROCEDURES
Vitals:    09/17/21 1300   BP: 114/68  Comment: pre treatment   BP Location: Left arm   Patient Position: Sitting   Pulse: 78   Resp: 16   Temp: 97.4 °F (36.3 °C)   TempSrc: Temporal   PainSc: 0-No pain        Procedure   WOUND CARE HYPERBARIC  Performed by: Narayan Dumont MD  Authorized by: Shea Daniels MD     Treatment number: 3   Mobility  The following were used for the patient's mobility: ambulatory  The patient appears oriented  The lungs are clear.   TM's:     Right Ear:     -Intact      Left Ear:     -Intact  Pre-treatment Actions   The patient was voided/has a catheter.    Safety   A grounding device was attached.  The safety checklist was reviewed.   We identified the patient.  We identified the patient using the following methods: Photo ID, verbally and date of birth.    Treatment Profile: 2.0   The total time of the procedure was 110 minutes.   Pressure    The patient's Leave Surface Rate was 1:16 PSI / minutes. The patient's Reach Bottom was 1:26 minutes. The patient's Leave Bottom Rate was 2:56 PSI / minutes. The patient's Return Surface was 3:06 minutes.   Post-Treatment Vital Signs and Assessment   Lungs:  Clear  Right TMs:  Intact  Left TMs:  Intact           Results for orders placed or performed in visit on 09/17/21   1. POC Glucose (Collected: 9/17/2021  3:15 PM)    Specimen: Blood   Result Value Ref Range    Glucose 223 (H) 70 - 99 mg/dL

## 2021-09-18 ENCOUNTER — HOSPITAL ENCOUNTER (OUTPATIENT)
Dept: INFUSION THERAPY | Facility: HOSPITAL | Age: 61
Setting detail: INFUSION SERIES
Discharge: HOME | End: 2021-09-18

## 2021-09-18 VITALS
HEART RATE: 62 BPM | SYSTOLIC BLOOD PRESSURE: 111 MMHG | DIASTOLIC BLOOD PRESSURE: 69 MMHG | TEMPERATURE: 97.5 F | OXYGEN SATURATION: 99 % | RESPIRATION RATE: 20 BRPM

## 2021-09-18 DIAGNOSIS — L97.424 DIABETIC ULCER OF LEFT HEEL ASSOCIATED WITH TYPE 2 DIABETES MELLITUS, WITH NECROSIS OF BONE (HCC): ICD-10-CM

## 2021-09-18 DIAGNOSIS — E11.621 DIABETIC ULCER OF RIGHT MIDFOOT ASSOCIATED WITH TYPE 2 DIABETES MELLITUS, WITH MUSCLE INVOLVEMENT WITHOUT EVIDENCE OF NECROSIS (HCC): ICD-10-CM

## 2021-09-18 DIAGNOSIS — E11.621 DIABETIC ULCER OF LEFT HEEL ASSOCIATED WITH TYPE 2 DIABETES MELLITUS, WITH NECROSIS OF BONE (HCC): ICD-10-CM

## 2021-09-18 DIAGNOSIS — L97.415 DIABETIC ULCER OF RIGHT MIDFOOT ASSOCIATED WITH TYPE 2 DIABETES MELLITUS, WITH MUSCLE INVOLVEMENT WITHOUT EVIDENCE OF NECROSIS (HCC): ICD-10-CM

## 2021-09-18 DIAGNOSIS — M86.172 ACUTE OSTEOMYELITIS OF LEFT CALCANEUS (HCC): Primary | ICD-10-CM

## 2021-09-18 LAB
ANION GAP SERPL CALCULATED.3IONS-SCNC: 8.3 MMOL/L (ref 5–15)
BASOPHILS # BLD AUTO: 0.03 10*3/MM3 (ref 0–0.2)
BASOPHILS NFR BLD AUTO: 0.7 % (ref 0–1.5)
BUN SERPL-MCNC: 11 MG/DL (ref 8–23)
BUN/CREAT SERPL: 18 (ref 7–25)
CALCIUM SPEC-SCNC: 8.9 MG/DL (ref 8.6–10.5)
CHLORIDE SERPL-SCNC: 105 MMOL/L (ref 98–107)
CO2 SERPL-SCNC: 24.7 MMOL/L (ref 22–29)
CREAT SERPL-MCNC: 0.61 MG/DL (ref 0.76–1.27)
CRP SERPL-MCNC: 0.9 MG/DL (ref 0–0.5)
DEPRECATED RDW RBC AUTO: 46.5 FL (ref 37–54)
EOSINOPHIL # BLD AUTO: 0.07 10*3/MM3 (ref 0–0.4)
EOSINOPHIL NFR BLD AUTO: 1.6 % (ref 0.3–6.2)
ERYTHROCYTE [DISTWIDTH] IN BLOOD BY AUTOMATED COUNT: 14.7 % (ref 12.3–15.4)
ERYTHROCYTE [SEDIMENTATION RATE] IN BLOOD: 8 MM/HR (ref 0–20)
GFR SERPL CREATININE-BSD FRML MDRD: 134 ML/MIN/1.73
GLUCOSE SERPL-MCNC: 188 MG/DL (ref 65–99)
HCT VFR BLD AUTO: 40.5 % (ref 37.5–51)
HGB BLD-MCNC: 13.1 G/DL (ref 13–17.7)
IMM GRANULOCYTES # BLD AUTO: 0.01 10*3/MM3 (ref 0–0.05)
IMM GRANULOCYTES NFR BLD AUTO: 0.2 % (ref 0–0.5)
LYMPHOCYTES # BLD AUTO: 0.88 10*3/MM3 (ref 0.7–3.1)
LYMPHOCYTES NFR BLD AUTO: 20 % (ref 19.6–45.3)
MCH RBC QN AUTO: 28 PG (ref 26.6–33)
MCHC RBC AUTO-ENTMCNC: 32.3 G/DL (ref 31.5–35.7)
MCV RBC AUTO: 86.5 FL (ref 79–97)
MONOCYTES # BLD AUTO: 0.34 10*3/MM3 (ref 0.1–0.9)
MONOCYTES NFR BLD AUTO: 7.7 % (ref 5–12)
NEUTROPHILS NFR BLD AUTO: 3.08 10*3/MM3 (ref 1.7–7)
NEUTROPHILS NFR BLD AUTO: 69.8 % (ref 42.7–76)
NRBC BLD AUTO-RTO: 0 /100 WBC (ref 0–0.2)
PLATELET # BLD AUTO: 81 10*3/MM3 (ref 140–450)
PMV BLD AUTO: 12.4 FL (ref 6–12)
POTASSIUM SERPL-SCNC: 4.2 MMOL/L (ref 3.5–5.2)
RBC # BLD AUTO: 4.68 10*6/MM3 (ref 4.14–5.8)
SODIUM SERPL-SCNC: 138 MMOL/L (ref 136–145)
WBC # BLD AUTO: 4.41 10*3/MM3 (ref 3.4–10.8)

## 2021-09-18 PROCEDURE — 85652 RBC SED RATE AUTOMATED: CPT | Performed by: EMERGENCY MEDICINE

## 2021-09-18 PROCEDURE — 85025 COMPLETE CBC W/AUTO DIFF WBC: CPT | Performed by: EMERGENCY MEDICINE

## 2021-09-18 PROCEDURE — 96365 THER/PROPH/DIAG IV INF INIT: CPT

## 2021-09-18 PROCEDURE — 25010000002 ERTAPENEM PER 500 MG: Performed by: EMERGENCY MEDICINE

## 2021-09-18 PROCEDURE — 80048 BASIC METABOLIC PNL TOTAL CA: CPT | Performed by: EMERGENCY MEDICINE

## 2021-09-18 PROCEDURE — 97602 WOUND(S) CARE NON-SELECTIVE: CPT

## 2021-09-18 PROCEDURE — G0463 HOSPITAL OUTPT CLINIC VISIT: HCPCS

## 2021-09-18 PROCEDURE — 86140 C-REACTIVE PROTEIN: CPT | Performed by: EMERGENCY MEDICINE

## 2021-09-18 RX ADMIN — ERTAPENEM 1 G: 1 INJECTION INTRAMUSCULAR; INTRAVENOUS at 10:45

## 2021-09-19 ENCOUNTER — HOSPITAL ENCOUNTER (OUTPATIENT)
Dept: INFUSION THERAPY | Facility: HOSPITAL | Age: 61
Discharge: HOME | End: 2021-09-19
Admitting: EMERGENCY MEDICINE

## 2021-09-19 DIAGNOSIS — M86.172 ACUTE OSTEOMYELITIS OF LEFT CALCANEUS (HCC): Primary | ICD-10-CM

## 2021-09-19 DIAGNOSIS — L97.424 DIABETIC ULCER OF LEFT HEEL ASSOCIATED WITH TYPE 2 DIABETES MELLITUS, WITH NECROSIS OF BONE (HCC): ICD-10-CM

## 2021-09-19 DIAGNOSIS — E11.621 DIABETIC ULCER OF LEFT HEEL ASSOCIATED WITH TYPE 2 DIABETES MELLITUS, WITH NECROSIS OF BONE (HCC): ICD-10-CM

## 2021-09-19 PROCEDURE — G0463 HOSPITAL OUTPT CLINIC VISIT: HCPCS

## 2021-09-19 PROCEDURE — 96365 THER/PROPH/DIAG IV INF INIT: CPT

## 2021-09-20 ENCOUNTER — HOSPITAL ENCOUNTER (OUTPATIENT)
Dept: INFUSION THERAPY | Facility: HOSPITAL | Age: 61
Setting detail: INFUSION SERIES
Discharge: HOME | End: 2021-09-20

## 2021-09-20 ENCOUNTER — PROCEDURE VISIT (OUTPATIENT)
Dept: WOUND CARE | Facility: HOSPITAL | Age: 61
End: 2021-09-20

## 2021-09-20 VITALS
DIASTOLIC BLOOD PRESSURE: 68 MMHG | HEART RATE: 74 BPM | TEMPERATURE: 97.2 F | SYSTOLIC BLOOD PRESSURE: 122 MMHG | RESPIRATION RATE: 18 BRPM

## 2021-09-20 VITALS
HEART RATE: 68 BPM | SYSTOLIC BLOOD PRESSURE: 106 MMHG | RESPIRATION RATE: 20 BRPM | TEMPERATURE: 98 F | OXYGEN SATURATION: 98 % | DIASTOLIC BLOOD PRESSURE: 64 MMHG

## 2021-09-20 DIAGNOSIS — L97.415 DIABETIC ULCER OF RIGHT MIDFOOT ASSOCIATED WITH TYPE 2 DIABETES MELLITUS, WITH MUSCLE INVOLVEMENT WITHOUT EVIDENCE OF NECROSIS (HCC): ICD-10-CM

## 2021-09-20 DIAGNOSIS — L97.424 DIABETIC ULCER OF LEFT HEEL ASSOCIATED WITH TYPE 2 DIABETES MELLITUS, WITH NECROSIS OF BONE (HCC): ICD-10-CM

## 2021-09-20 DIAGNOSIS — E11.621 DIABETIC ULCER OF LEFT HEEL ASSOCIATED WITH TYPE 2 DIABETES MELLITUS, WITH NECROSIS OF BONE (HCC): ICD-10-CM

## 2021-09-20 DIAGNOSIS — M86.172 ACUTE OSTEOMYELITIS OF LEFT CALCANEUS (HCC): Primary | ICD-10-CM

## 2021-09-20 DIAGNOSIS — M86.8X7 OTHER OSTEOMYELITIS OF LEFT FOOT (HCC): ICD-10-CM

## 2021-09-20 DIAGNOSIS — E11.621 DIABETIC ULCER OF RIGHT MIDFOOT ASSOCIATED WITH TYPE 2 DIABETES MELLITUS, WITH MUSCLE INVOLVEMENT WITHOUT EVIDENCE OF NECROSIS (HCC): ICD-10-CM

## 2021-09-20 LAB
GLUCOSE BLDC GLUCOMTR-MCNC: 189 MG/DL (ref 70–99)
GLUCOSE BLDC GLUCOMTR-MCNC: 216 MG/DL (ref 70–99)

## 2021-09-20 PROCEDURE — 96365 THER/PROPH/DIAG IV INF INIT: CPT

## 2021-09-20 PROCEDURE — G0463 HOSPITAL OUTPT CLINIC VISIT: HCPCS

## 2021-09-20 PROCEDURE — 97602 WOUND(S) CARE NON-SELECTIVE: CPT

## 2021-09-20 PROCEDURE — 99183 HYPERBARIC OXYGEN THERAPY: CPT | Performed by: EMERGENCY MEDICINE

## 2021-09-20 PROCEDURE — 82962 GLUCOSE BLOOD TEST: CPT | Performed by: EMERGENCY MEDICINE

## 2021-09-20 PROCEDURE — 25010000002 ERTAPENEM PER 500 MG: Performed by: EMERGENCY MEDICINE

## 2021-09-20 PROCEDURE — G0277 HBOT, FULL BODY CHAMBER, 30M: HCPCS

## 2021-09-20 RX ADMIN — ERTAPENEM 1 G: 1 INJECTION INTRAMUSCULAR; INTRAVENOUS at 12:04

## 2021-09-20 NOTE — PROCEDURES
Vitals:    09/20/21 0845   BP: 104/66  Comment: pre treatment   BP Location: Left arm   Patient Position: Sitting   Pulse: 68   Resp: 16   Temp: 97.6 °F (36.4 °C)   TempSrc: Temporal   PainSc: 0-No pain        Procedure   WOUND CARE HYPERBARIC  Performed by: Shea Daniels MD  Authorized by: Shea Daniels MD     Treatment number: 4   Mobility  The following were used for the patient's mobility: ambulatory  The patient appears alert and oriented  The lungs are clear.   TM's:     Left Ear:     -Intact  Pre-treatment Actions   The patient was voided/has a catheter.    Safety   A grounding device was attached.  We identified the patient.  We identified the patient using the following methods: Photo ID, verbally and date of birth.    Treatment Profile: 2.0   The total time of the procedure was 110 minutes.   Pressure    The patient's Leave Surface Rate was 9:02 PSI / minutes. The patient's Reach Bottom was 9:12 minutes. The patient's Leave Bottom Rate was 10:42 PSI / minutes. The patient's Return Surface was 10:52 minutes.   Post-Treatment Vital Signs and Assessment   Lungs:  Clear  Right TMs:  Intact  Left TMs:  Intact           Results for orders placed or performed in visit on 09/20/21   1. POC Glucose (Collected: 9/20/2021 11:01 AM)    Specimen: Blood   Result Value Ref Range    Glucose 189 (H) 70 - 99 mg/dL

## 2021-09-21 ENCOUNTER — HOSPITAL ENCOUNTER (OUTPATIENT)
Dept: INFUSION THERAPY | Facility: HOSPITAL | Age: 61
Setting detail: INFUSION SERIES
Discharge: HOME | End: 2021-09-21

## 2021-09-21 ENCOUNTER — PROCEDURE VISIT (OUTPATIENT)
Dept: WOUND CARE | Facility: HOSPITAL | Age: 61
End: 2021-09-21

## 2021-09-21 VITALS
SYSTOLIC BLOOD PRESSURE: 114 MMHG | DIASTOLIC BLOOD PRESSURE: 79 MMHG | RESPIRATION RATE: 20 BRPM | OXYGEN SATURATION: 97 % | TEMPERATURE: 97.6 F | HEART RATE: 60 BPM

## 2021-09-21 VITALS
TEMPERATURE: 98.4 F | SYSTOLIC BLOOD PRESSURE: 114 MMHG | HEART RATE: 70 BPM | RESPIRATION RATE: 18 BRPM | DIASTOLIC BLOOD PRESSURE: 78 MMHG

## 2021-09-21 DIAGNOSIS — E11.621 DIABETIC ULCER OF LEFT HEEL ASSOCIATED WITH TYPE 2 DIABETES MELLITUS, WITH NECROSIS OF BONE (HCC): ICD-10-CM

## 2021-09-21 DIAGNOSIS — L97.424 DIABETIC ULCER OF LEFT HEEL ASSOCIATED WITH TYPE 2 DIABETES MELLITUS, WITH NECROSIS OF BONE (HCC): ICD-10-CM

## 2021-09-21 DIAGNOSIS — M86.172 ACUTE OSTEOMYELITIS OF LEFT CALCANEUS (HCC): Primary | ICD-10-CM

## 2021-09-21 DIAGNOSIS — M86.8X7 OTHER OSTEOMYELITIS OF LEFT FOOT (HCC): ICD-10-CM

## 2021-09-21 DIAGNOSIS — E11.621 DIABETIC ULCER OF RIGHT MIDFOOT ASSOCIATED WITH TYPE 2 DIABETES MELLITUS, WITH MUSCLE INVOLVEMENT WITHOUT EVIDENCE OF NECROSIS (HCC): ICD-10-CM

## 2021-09-21 DIAGNOSIS — L97.415 DIABETIC ULCER OF RIGHT MIDFOOT ASSOCIATED WITH TYPE 2 DIABETES MELLITUS, WITH MUSCLE INVOLVEMENT WITHOUT EVIDENCE OF NECROSIS (HCC): ICD-10-CM

## 2021-09-21 LAB
GLUCOSE BLDC GLUCOMTR-MCNC: 179 MG/DL (ref 70–99)
GLUCOSE BLDC GLUCOMTR-MCNC: 249 MG/DL (ref 70–99)

## 2021-09-21 PROCEDURE — 25010000002 ERTAPENEM PER 500 MG: Performed by: EMERGENCY MEDICINE

## 2021-09-21 PROCEDURE — G0463 HOSPITAL OUTPT CLINIC VISIT: HCPCS

## 2021-09-21 PROCEDURE — 97602 WOUND(S) CARE NON-SELECTIVE: CPT

## 2021-09-21 PROCEDURE — G0277 HBOT, FULL BODY CHAMBER, 30M: HCPCS

## 2021-09-21 PROCEDURE — 82962 GLUCOSE BLOOD TEST: CPT

## 2021-09-21 PROCEDURE — 96365 THER/PROPH/DIAG IV INF INIT: CPT

## 2021-09-21 PROCEDURE — 99183 HYPERBARIC OXYGEN THERAPY: CPT | Performed by: EMERGENCY MEDICINE

## 2021-09-21 RX ADMIN — ERTAPENEM 1 G: 1 INJECTION INTRAMUSCULAR; INTRAVENOUS at 11:39

## 2021-09-21 NOTE — PROCEDURES
Vitals:    09/21/21 0845   BP: 118/82  Comment: pre treatment   BP Location: Left arm   Patient Position: Sitting   Pulse: 70   Resp: 18   Temp: 97.5 °F (36.4 °C)   TempSrc: Temporal   PainSc: 0-No pain        Procedure   WOUND CARE HYPERBARIC  Performed by: Shea Daniels MD  Authorized by: Shea Daniels MD     Treatment number: 5   Mobility  The following were used for the patient's mobility: ambulatory  The patient appears alert and oriented  The lungs are clear.   TM's:     Right Ear:     -Intact      Left Ear:     -Intact  Pre-treatment Actions   The patient was voided/has a catheter.    Safety   A grounding device was attached.  The safety checklist was reviewed.   We identified the patient.  We identified the patient using the following methods: Photo ID, verbally and date of birth.    Treatment Profile: 2.0   The total time of the procedure was 110 minutes.   Pressure    The patient's Leave Surface Rate was 9:05 PSI / minutes. The patient's Reach Bottom was 9:15 minutes. The patient's Leave Bottom Rate was 10:45 PSI / minutes. The patient's Return Surface was 10:55 minutes.   Post-Treatment Vital Signs and Assessment   Lungs:  Clear  Right TMs:  Intact  Left TMs:  Intact           Results for orders placed or performed in visit on 09/21/21   1. POC Glucose (Collected: 9/21/2021 11:05 AM)    Specimen: Blood   Result Value Ref Range    Glucose 179 (H) 70 - 99 mg/dL

## 2021-09-22 ENCOUNTER — HOSPITAL ENCOUNTER (OUTPATIENT)
Dept: INFUSION THERAPY | Facility: HOSPITAL | Age: 61
Setting detail: INFUSION SERIES
Discharge: HOME | End: 2021-09-22

## 2021-09-22 ENCOUNTER — OFFICE VISIT (OUTPATIENT)
Dept: WOUND CARE | Facility: HOSPITAL | Age: 61
End: 2021-09-22

## 2021-09-22 VITALS
WEIGHT: 315 LBS | DIASTOLIC BLOOD PRESSURE: 80 MMHG | BODY MASS INDEX: 40.43 KG/M2 | HEIGHT: 74 IN | HEART RATE: 69 BPM | RESPIRATION RATE: 18 BRPM | SYSTOLIC BLOOD PRESSURE: 122 MMHG | TEMPERATURE: 97.5 F

## 2021-09-22 VITALS
HEART RATE: 63 BPM | DIASTOLIC BLOOD PRESSURE: 77 MMHG | TEMPERATURE: 98.1 F | OXYGEN SATURATION: 98 % | SYSTOLIC BLOOD PRESSURE: 117 MMHG | RESPIRATION RATE: 20 BRPM

## 2021-09-22 DIAGNOSIS — E11.621 DIABETIC ULCER OF LEFT HEEL ASSOCIATED WITH TYPE 2 DIABETES MELLITUS, WITH NECROSIS OF BONE (HCC): ICD-10-CM

## 2021-09-22 DIAGNOSIS — M86.172 ACUTE OSTEOMYELITIS OF LEFT CALCANEUS (HCC): Primary | ICD-10-CM

## 2021-09-22 DIAGNOSIS — L97.415 DIABETIC ULCER OF RIGHT MIDFOOT ASSOCIATED WITH TYPE 2 DIABETES MELLITUS, WITH MUSCLE INVOLVEMENT WITHOUT EVIDENCE OF NECROSIS (HCC): ICD-10-CM

## 2021-09-22 DIAGNOSIS — E11.621 DIABETIC ULCER OF RIGHT MIDFOOT ASSOCIATED WITH TYPE 2 DIABETES MELLITUS, WITH MUSCLE INVOLVEMENT WITHOUT EVIDENCE OF NECROSIS (HCC): ICD-10-CM

## 2021-09-22 DIAGNOSIS — D69.6 THROMBOCYTOPENIA (HCC): ICD-10-CM

## 2021-09-22 DIAGNOSIS — E11.42 DIABETIC POLYNEUROPATHY ASSOCIATED WITH TYPE 2 DIABETES MELLITUS (HCC): ICD-10-CM

## 2021-09-22 DIAGNOSIS — E11.621 DIABETIC ULCER OF LEFT HEEL ASSOCIATED WITH TYPE 2 DIABETES MELLITUS, WITH NECROSIS OF BONE (HCC): Primary | ICD-10-CM

## 2021-09-22 DIAGNOSIS — L97.424 DIABETIC ULCER OF LEFT HEEL ASSOCIATED WITH TYPE 2 DIABETES MELLITUS, WITH NECROSIS OF BONE (HCC): Primary | ICD-10-CM

## 2021-09-22 DIAGNOSIS — M86.8X7 OTHER OSTEOMYELITIS OF LEFT FOOT (HCC): ICD-10-CM

## 2021-09-22 DIAGNOSIS — L97.424 DIABETIC ULCER OF LEFT HEEL ASSOCIATED WITH TYPE 2 DIABETES MELLITUS, WITH NECROSIS OF BONE (HCC): ICD-10-CM

## 2021-09-22 PROCEDURE — 96365 THER/PROPH/DIAG IV INF INIT: CPT

## 2021-09-22 PROCEDURE — 99213 OFFICE O/P EST LOW 20 MIN: CPT | Performed by: EMERGENCY MEDICINE

## 2021-09-22 PROCEDURE — G0463 HOSPITAL OUTPT CLINIC VISIT: HCPCS

## 2021-09-22 PROCEDURE — 11043 DBRDMT MUSC&/FSCA 1ST 20/<: CPT | Performed by: EMERGENCY MEDICINE

## 2021-09-22 PROCEDURE — 97597 DBRDMT OPN WND 1ST 20 CM/<: CPT | Performed by: EMERGENCY MEDICINE

## 2021-09-22 PROCEDURE — 25010000002 ERTAPENEM PER 500 MG: Performed by: EMERGENCY MEDICINE

## 2021-09-22 RX ADMIN — ERTAPENEM 1 G: 1 INJECTION INTRAMUSCULAR; INTRAVENOUS at 12:27

## 2021-09-22 NOTE — SIGNIFICANT NOTE
Epithelial %: 0%    Exposed Bone: no    Exposed Tendon: no    Impression: unchanged    Short Term Goals: INCREASE GRANULATION AND DECREASE SIZE

## 2021-09-22 NOTE — PROGRESS NOTES
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  He has been followed in the wound care clinic for nonhealing ulcers on his left heel and right distal midfoot. MRI showed osteomyelitis of the left heel. He had a PICC line placed 08/20/2021 and is undergoing IV antibiotics daily (ertapenem).  He recently started to undergo hyperbaric oxygen therapy for his osteomyelitis and is tolerating it well.  He is trying to control his sugars better and really watch what he eats.  He says he has a hard time offloading the wounds because he still has to do his ADLs and go to the laundromat etc.    Wounds have been getting packed with Aquacel Ag. They seem to be improving. He reports that he has a busy day tomorrow with getting hyperbarics, IV antibiotics, and then seeing the diabetic nurse practitioner.  He says he has been continuing to try to manage his dietary intake and eating more plant-based foods and cutting out fried foods as much as possible to improve his glucose control.      Allergies:  Adhesive tape      Current Outpatient Medications:   •  alfuzosin (UROXATRAL) 10 MG 24 hr tablet, Take 10 mg by mouth Daily., Disp: , Rfl:   •  apixaban (Eliquis) 5 MG tablet tablet, Take 10 mg by mouth 2 (two) times a day., Disp: , Rfl:   •  buPROPion XL (WELLBUTRIN XL) 300 MG 24 hr tablet, Take 300 mg by mouth 2 (Two) Times a Day., Disp: , Rfl:   •  citalopram (CeleXA) 10 MG tablet, TAKE (1) TABLET BY MOUTH 1 TIME PER DAY AT BEDTIME, Disp: , Rfl:   •  gentamicin (GARAMYCIN) 0.1 % ointment, Apply  topically to the appropriate area as directed 3 (Three) Times a Day., Disp: 30 g, Rfl: 3  •  insulin NPH-insulin regular (NovoLIN 70/30) (70-30) 100 UNIT/ML injection, Inject 10 Units under the skin into the appropriate area as directed 2 (two) times a day., Disp: , Rfl:   •  lisinopril (PRINIVIL,ZESTRIL) 20  MG tablet, Take 20 mg by mouth Daily., Disp: , Rfl:   •  metFORMIN (GLUCOPHAGE) 1000 MG tablet, Take 2,000 mg by mouth 2 (two) times a day., Disp: , Rfl:   •  metoprolol succinate XL (TOPROL-XL) 50 MG 24 hr tablet, Take 50 mg by mouth Daily., Disp: , Rfl:   •  sertraline (ZOLOFT) 100 MG tablet, Take 100 mg by mouth Daily., Disp: , Rfl:   •  simvastatin (Zocor) 20 MG tablet, Take 20 mg by mouth Every Night., Disp: , Rfl:   No current facility-administered medications for this visit.    Facility-Administered Medications Ordered in Other Visits:   •  ertapenem (INVanz) 1 g/100 mL 0.9% NS VTB (mbp), 1 g, Intravenous, Once, Shea Daniels MD    Past Medical History:   Diagnosis Date   • Absence of toe of right foot    • Acute osteomyelitis of left calcaneus  2021   • Anxiety and depression    • Arthritis    • Claustrophobia    • Corns and callus    • Diabetic ulcer of left heel associated with type 2 DM 2021   • Diabetic ulcer of left heel associated with type 2 DM 2021   • Diabetic ulcer of right midfoot associated with type 2 DM 2021   • Difficulty walking    • Essential hypertension 2021   • Hammertoe    • Hyperlipidemia LDL goal <100 2021   • Ingrown toenail    • Obesity    • Paroxysmal atrial fibrillation 2021   • Polyneuropathy    • Pressure ulcer, stage 1    • Tinea unguium    • Type 2 diabetes mellitus with polyneuropathy      Past Surgical History:   Procedure Laterality Date   • CYST REMOVAL     • INCISION AND DRAINAGE ABSCESS     • OTHER SURGICAL HISTORY      Surgical clips   • TOE SURGERY      Removal   • WRIST SURGERY       Social History     Socioeconomic History   • Marital status: Single     Spouse name: Not on file   • Number of children: Not on file   • Years of education: Not on file   • Highest education level: Not on file   Tobacco Use   • Smoking status: Former Smoker     Packs/day: 1.00     Types: Cigarettes     Quit date: 1991     Years since quittin.0  "  • Smokeless tobacco: Never Used   Vaping Use   • Vaping Use: Never used   Substance and Sexual Activity   • Alcohol use: Yes     Comment: Rare   • Drug use: Not Currently   • Sexual activity: Defer     Family History   Problem Relation Age of Onset   • Heart disease Mother    • Heart disease Father    • Cancer Father         Unspecified   • Heart disease Brother          Objective     Vitals:    09/22/21 1122   BP: 122/80   BP Location: Left arm   Patient Position: Sitting   Pulse: 69   Resp: 18   Temp: 97.5 °F (36.4 °C)   TempSrc: Temporal   Weight: (!) 163 kg (359 lb)   Height: 188 cm (74\")   PainSc: 0-No pain     Body mass index is 46.09 kg/m².    STEADI Fall Risk Assessment has not been completed.     Review of Systems     ROS:  No fevers, chills, sweats, or body aches. No shortness of breath.     I have reviewed the HPI and ROS as documented by MA/RN. Shea Daniels MD    Physical Exam     NAD, well dressed, well nourished  Normocephalic, atraumatic  EOMI, no scleral icterus  No respiratory distress, no cough  AAOx3, pleasant, cooperative  Callus to BLE plantar foot ulcers.  The LLE heel wound to look smaller and healthier although there is some devitalized muscle in the base of the wound.  RLE plantar wound with decreased callus buildup, and some persistent devitalized tissue in the base, but overall improving.  No further undermining of RLE wound identified today.  Wounds are getting shallower and smaller.   No erythema, purulence, induration, or fluctuance.  LLE wound on heel remains tender with pressure but not with sharp stimuli.  See photos below for details.                  Result Review :  The following data was reviewed by: Shea Daniels MD on 09/16/2021:    Recent labs, prior wound photos, prior office notes.  Nursing measurements.    Patient with thrombocytopenia, worsened since 2019. CRP 0.9, ESR WNL and stable at 8. I contacted his PCP about his results.     Wound debridement  Performed by: " Shea Daniels MD  Authorized by: Shea Daniels MD     Correct patient: Identification verified by two methods:     Verbally and Date of birth  Correct procedure/consent    Correct site/side    Correct equipment as applicable    How many wounds are you performing a debridement on?:  2  Wound 1:     Nursing Documentation:     Wound Location:  LLE foot wound  Measurements:     The total amount debrided on this wound was under 20 cm2.     Provider Documentation    Debridement type:  Sharp/excisional    Betadine      Other:  Sterile 10 blade scalpel     None    Tissue debrided:  Moderate    Type tissue:  Slough and Other    Other tissue type:  Periwound callus, nonviable epidermis, dermis, subQ tissues, and muscle from deepest base of the wound debrided    Level removed:  Subcutaneous, Muscle and Skin    Patient tolerance:  Good    Hemostasis achieved by:  Direct pressure    Wound Bed Post Debridement: See Photo    Wound 2:     Wound Location:  RLE foot wound   Measurements:    The total amount debrided on this wound was under 20 cm2.      Provider Documentation:     Debridement type:  Sharp/Excisional    Betadine      Other:  Sterile 10 blade scalpel     None    Tissue debrided:  Small    Type tissue:  Slough and Other    Other tissue type:  Devitalized dermis and epidermis as well as periwound callus    Level removed:  Skin    Patient tolerance:  Good    Hemostasis achieved by:  Direct Pressure    Wound Bed Post Debridement: See Photo      Good granulation tissue in bases of the wounds.  Repacked with Aquacel Ag.    Post debridement photos:                         Assessment and Plan   Diagnoses and all orders for this visit:    1. 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 Other osteomyelitis of left foot (HCC)    3. Diabetic polyneuropathy associated with type 2 diabetes mellitus (HCC)    4. Diabetic ulcer of right midfoot associated with  type 2 diabetes mellitus, with muscle involvement without evidence of necrosis (HCC)    5. Thrombocytopenia (HCC)    Other orders  -     Wound debridement         Continue full course of hyperbaric oxygen therapy.  Continue IV antibiotics via PICC line.  Continue Aquacel Ag to wounds, they appear to be improving at each visit.  Thrombocytopenia noted on labs which is stable from his most recent visit but diminished since 2019.  PCP notified.    Wounds were again packed with Aquacel Ag after debridement.  I will see him again in 1 week for a recheck.  He is strongly advised to offload his left foot in particular.  He says he has used a walking boot before but did not like it because it was hard to get around.  He is instructed to continue trying to improve his glucose control and weight, and keep his appointment with the diabetic specialist tomorrow.    Patient was given instructions and counseling regarding their condition or for health maintenance advice, as well as the wound care plan and recommendations. They understand and agree with the plan.  They will follow back up here in the clinic but are instructed to contact us in the interim should they have any new, returning, or worsening symptoms or concerns. Please see specific information pulled into the AVS if appropriate.       Follow Up   Return in about 1 week (around 9/29/2021) for Recheck.      Shea Daniels MD

## 2021-09-23 ENCOUNTER — HOSPITAL ENCOUNTER (OUTPATIENT)
Dept: INFUSION THERAPY | Facility: HOSPITAL | Age: 61
Setting detail: INFUSION SERIES
Discharge: HOME | End: 2021-09-23

## 2021-09-23 ENCOUNTER — OFFICE VISIT (OUTPATIENT)
Dept: DIABETES SERVICES | Facility: HOSPITAL | Age: 61
End: 2021-09-23

## 2021-09-23 ENCOUNTER — PROCEDURE VISIT (OUTPATIENT)
Dept: WOUND CARE | Facility: HOSPITAL | Age: 61
End: 2021-09-23

## 2021-09-23 VITALS
TEMPERATURE: 98.2 F | HEART RATE: 69 BPM | DIASTOLIC BLOOD PRESSURE: 79 MMHG | RESPIRATION RATE: 20 BRPM | SYSTOLIC BLOOD PRESSURE: 124 MMHG | OXYGEN SATURATION: 98 %

## 2021-09-23 VITALS
SYSTOLIC BLOOD PRESSURE: 116 MMHG | DIASTOLIC BLOOD PRESSURE: 64 MMHG | RESPIRATION RATE: 16 BRPM | HEART RATE: 74 BPM | TEMPERATURE: 97.6 F

## 2021-09-23 VITALS
DIASTOLIC BLOOD PRESSURE: 81 MMHG | HEART RATE: 71 BPM | SYSTOLIC BLOOD PRESSURE: 97 MMHG | HEIGHT: 74 IN | TEMPERATURE: 96.6 F | WEIGHT: 315 LBS | OXYGEN SATURATION: 99 % | BODY MASS INDEX: 40.43 KG/M2

## 2021-09-23 DIAGNOSIS — L97.424 DIABETIC ULCER OF LEFT HEEL ASSOCIATED WITH TYPE 2 DIABETES MELLITUS, WITH NECROSIS OF BONE (HCC): ICD-10-CM

## 2021-09-23 DIAGNOSIS — E11.621 DIABETIC ULCER OF RIGHT MIDFOOT ASSOCIATED WITH TYPE 2 DIABETES MELLITUS, WITH MUSCLE INVOLVEMENT WITHOUT EVIDENCE OF NECROSIS (HCC): ICD-10-CM

## 2021-09-23 DIAGNOSIS — M86.172 ACUTE OSTEOMYELITIS OF LEFT CALCANEUS (HCC): Primary | ICD-10-CM

## 2021-09-23 DIAGNOSIS — L97.415 DIABETIC ULCER OF RIGHT MIDFOOT ASSOCIATED WITH TYPE 2 DIABETES MELLITUS, WITH MUSCLE INVOLVEMENT WITHOUT EVIDENCE OF NECROSIS (HCC): ICD-10-CM

## 2021-09-23 DIAGNOSIS — L97.422 DIABETIC ULCER OF LEFT HEEL ASSOCIATED WITH TYPE 2 DIABETES MELLITUS, WITH FAT LAYER EXPOSED (HCC): ICD-10-CM

## 2021-09-23 DIAGNOSIS — E66.01 SEVERE OBESITY (BMI >= 40) (HCC): ICD-10-CM

## 2021-09-23 DIAGNOSIS — E11.621 DIABETIC ULCER OF LEFT HEEL ASSOCIATED WITH TYPE 2 DIABETES MELLITUS, WITH NECROSIS OF BONE (HCC): ICD-10-CM

## 2021-09-23 DIAGNOSIS — E11.65 UNCONTROLLED TYPE 2 DIABETES MELLITUS WITH HYPERGLYCEMIA (HCC): Primary | ICD-10-CM

## 2021-09-23 DIAGNOSIS — M86.8X7 OTHER OSTEOMYELITIS OF LEFT FOOT (HCC): ICD-10-CM

## 2021-09-23 DIAGNOSIS — E11.621 DIABETIC ULCER OF LEFT HEEL ASSOCIATED WITH TYPE 2 DIABETES MELLITUS, WITH FAT LAYER EXPOSED (HCC): ICD-10-CM

## 2021-09-23 LAB
GLUCOSE BLDC GLUCOMTR-MCNC: 133 MG/DL (ref 70–99)
GLUCOSE BLDC GLUCOMTR-MCNC: 161 MG/DL (ref 70–99)
GLUCOSE BLDC GLUCOMTR-MCNC: 162 MG/DL (ref 70–99)

## 2021-09-23 PROCEDURE — G0463 HOSPITAL OUTPT CLINIC VISIT: HCPCS | Performed by: NURSE PRACTITIONER

## 2021-09-23 PROCEDURE — 97602 WOUND(S) CARE NON-SELECTIVE: CPT

## 2021-09-23 PROCEDURE — 99214 OFFICE O/P EST MOD 30 MIN: CPT | Performed by: NURSE PRACTITIONER

## 2021-09-23 PROCEDURE — G0277 HBOT, FULL BODY CHAMBER, 30M: HCPCS

## 2021-09-23 PROCEDURE — 82962 GLUCOSE BLOOD TEST: CPT | Performed by: EMERGENCY MEDICINE

## 2021-09-23 PROCEDURE — 25010000002 ERTAPENEM PER 500 MG: Performed by: EMERGENCY MEDICINE

## 2021-09-23 PROCEDURE — 96365 THER/PROPH/DIAG IV INF INIT: CPT

## 2021-09-23 PROCEDURE — 99183 HYPERBARIC OXYGEN THERAPY: CPT | Performed by: EMERGENCY MEDICINE

## 2021-09-23 PROCEDURE — 82962 GLUCOSE BLOOD TEST: CPT | Performed by: NURSE PRACTITIONER

## 2021-09-23 PROCEDURE — G0463 HOSPITAL OUTPT CLINIC VISIT: HCPCS

## 2021-09-23 RX ORDER — DULAGLUTIDE 0.75 MG/.5ML
0.75 INJECTION, SOLUTION SUBCUTANEOUS
Qty: 2 ML | Refills: 1 | Status: SHIPPED | OUTPATIENT
Start: 2021-09-23 | End: 2021-10-18

## 2021-09-23 RX ADMIN — ERTAPENEM 1 G: 1 INJECTION INTRAMUSCULAR; INTRAVENOUS at 11:49

## 2021-09-23 NOTE — PROCEDURES
Vitals:    09/23/21 0845   BP: 110/60  Comment: PRE TREATMENT   BP Location: Left arm   Patient Position: Sitting   Pulse: 71   Resp: 16   Temp: 97.5 °F (36.4 °C)   TempSrc: Temporal   PainSc: 0-No pain        Procedure   WOUND CARE HYPERBARIC  Performed by: Shea Daniels MD  Authorized by: Shea Daniels MD     Treatment number: 6   Mobility  The following were used for the patient's mobility: ambulatory  The patient appears alert and oriented  The lungs are clear.   TM's:     Right Ear:     -Intact      Left Ear:     -Intact  Pre-treatment Actions   The patient was voided/has a catheter.    Safety   A grounding device was attached.  The safety checklist was reviewed.   We identified the patient.  We identified the patient using the following methods: verbally and date of birth.    Treatment Profile: 2.0   The total time of the procedure was 110 minutes.   Pressure    The patient's Leave Surface Rate was 9:01 PSI / minutes. The patient's Reach Bottom was 9:11 minutes. The patient's Leave Bottom Rate was 10:41 PSI / minutes. The patient's Return Surface was 10:51 minutes.   Post-Treatment Vital Signs and Assessment   Lungs:  CLEAR  Right TMs:  INTACT  Left TMs:  INTACT           Results for orders placed or performed in visit on 09/23/21   1. POC Glucose (Collected: 9/23/2021 10:58 AM)    Specimen: Blood   Result Value Ref Range    Glucose 133 (H) 70 - 99 mg/dL

## 2021-09-24 ENCOUNTER — PROCEDURE VISIT (OUTPATIENT)
Dept: WOUND CARE | Facility: HOSPITAL | Age: 61
End: 2021-09-24

## 2021-09-24 ENCOUNTER — HOSPITAL ENCOUNTER (OUTPATIENT)
Dept: INFUSION THERAPY | Facility: HOSPITAL | Age: 61
Setting detail: INFUSION SERIES
Discharge: HOME | End: 2021-09-24

## 2021-09-24 VITALS
RESPIRATION RATE: 18 BRPM | TEMPERATURE: 97.9 F | DIASTOLIC BLOOD PRESSURE: 66 MMHG | HEART RATE: 78 BPM | SYSTOLIC BLOOD PRESSURE: 102 MMHG

## 2021-09-24 VITALS
RESPIRATION RATE: 20 BRPM | OXYGEN SATURATION: 99 % | TEMPERATURE: 98 F | SYSTOLIC BLOOD PRESSURE: 116 MMHG | HEART RATE: 62 BPM | DIASTOLIC BLOOD PRESSURE: 68 MMHG

## 2021-09-24 DIAGNOSIS — M86.172 ACUTE OSTEOMYELITIS OF LEFT CALCANEUS (HCC): Primary | ICD-10-CM

## 2021-09-24 DIAGNOSIS — M86.8X7 OTHER OSTEOMYELITIS OF LEFT FOOT (HCC): ICD-10-CM

## 2021-09-24 DIAGNOSIS — L97.424 DIABETIC ULCER OF LEFT HEEL ASSOCIATED WITH TYPE 2 DIABETES MELLITUS, WITH NECROSIS OF BONE (HCC): ICD-10-CM

## 2021-09-24 DIAGNOSIS — L97.415 DIABETIC ULCER OF RIGHT MIDFOOT ASSOCIATED WITH TYPE 2 DIABETES MELLITUS, WITH MUSCLE INVOLVEMENT WITHOUT EVIDENCE OF NECROSIS (HCC): ICD-10-CM

## 2021-09-24 DIAGNOSIS — E11.621 DIABETIC ULCER OF RIGHT MIDFOOT ASSOCIATED WITH TYPE 2 DIABETES MELLITUS, WITH MUSCLE INVOLVEMENT WITHOUT EVIDENCE OF NECROSIS (HCC): ICD-10-CM

## 2021-09-24 DIAGNOSIS — E11.621 DIABETIC ULCER OF LEFT HEEL ASSOCIATED WITH TYPE 2 DIABETES MELLITUS, WITH NECROSIS OF BONE (HCC): ICD-10-CM

## 2021-09-24 LAB
GLUCOSE BLDC GLUCOMTR-MCNC: 134 MG/DL (ref 70–99)
GLUCOSE BLDC GLUCOMTR-MCNC: 153 MG/DL (ref 70–99)

## 2021-09-24 PROCEDURE — 99183 HYPERBARIC OXYGEN THERAPY: CPT | Performed by: EMERGENCY MEDICINE

## 2021-09-24 PROCEDURE — 25010000002 ERTAPENEM PER 500 MG: Performed by: EMERGENCY MEDICINE

## 2021-09-24 PROCEDURE — G0277 HBOT, FULL BODY CHAMBER, 30M: HCPCS

## 2021-09-24 PROCEDURE — 97602 WOUND(S) CARE NON-SELECTIVE: CPT

## 2021-09-24 PROCEDURE — 82962 GLUCOSE BLOOD TEST: CPT | Performed by: EMERGENCY MEDICINE

## 2021-09-24 PROCEDURE — 96365 THER/PROPH/DIAG IV INF INIT: CPT

## 2021-09-24 PROCEDURE — G0463 HOSPITAL OUTPT CLINIC VISIT: HCPCS

## 2021-09-24 RX ADMIN — ERTAPENEM 1 G: 1 INJECTION INTRAMUSCULAR; INTRAVENOUS at 11:38

## 2021-09-24 NOTE — PROCEDURES
Vitals:    09/24/21 0845   BP: 104/68  Comment: pre treatment   BP Location: Left arm   Patient Position: Sitting   Pulse: 68   Resp: 18   Temp: 97.7 °F (36.5 °C)   TempSrc: Temporal   PainSc: 0-No pain        Procedure   WOUND CARE HYPERBARIC  Performed by: Shea Daniels MD  Authorized by: Shea Daniels MD     Treatment number: 7   Mobility  The following were used for the patient's mobility: ambulatory  The patient appears alert and oriented  The lungs are clear.   TM's:     Right Ear:     -Intact      Left Ear:     -Intact  Pre-treatment Actions   The patient was voided/has a catheter.    Safety   A grounding device was attached.  The safety checklist was reviewed.   We identified the patient.  We identified the patient using the following methods: Photo ID, verbally and date of birth.    Treatment Profile: 2.0   The total time of the procedure was 110 minutes.   Pressure    The patient's Leave Surface Rate was 9:04 PSI / minutes. The patient's Reach Bottom was 9:14 minutes. The patient's Leave Bottom Rate was 10:44 PSI / minutes. The patient's Return Surface was 10:54 minutes.   Post-Treatment Vital Signs and Assessment   Lungs:  Clear  Right TMs:  Intact  Left TMs:  Intact           Results for orders placed or performed in visit on 09/24/21   1. POC Glucose (Collected: 9/24/2021 10:53 AM)    Specimen: Blood   Result Value Ref Range    Glucose 134 (H) 70 - 99 mg/dL

## 2021-09-25 ENCOUNTER — HOSPITAL ENCOUNTER (OUTPATIENT)
Dept: INFUSION THERAPY | Facility: HOSPITAL | Age: 61
Discharge: HOME | End: 2021-09-25
Admitting: EMERGENCY MEDICINE

## 2021-09-25 VITALS
RESPIRATION RATE: 16 BRPM | DIASTOLIC BLOOD PRESSURE: 73 MMHG | SYSTOLIC BLOOD PRESSURE: 117 MMHG | HEART RATE: 68 BPM | OXYGEN SATURATION: 99 % | TEMPERATURE: 97.9 F

## 2021-09-25 DIAGNOSIS — M86.172 ACUTE OSTEOMYELITIS OF LEFT CALCANEUS (HCC): Primary | ICD-10-CM

## 2021-09-25 DIAGNOSIS — E11.621 DIABETIC ULCER OF RIGHT MIDFOOT ASSOCIATED WITH TYPE 2 DIABETES MELLITUS, WITH MUSCLE INVOLVEMENT WITHOUT EVIDENCE OF NECROSIS (HCC): ICD-10-CM

## 2021-09-25 DIAGNOSIS — L97.415 DIABETIC ULCER OF RIGHT MIDFOOT ASSOCIATED WITH TYPE 2 DIABETES MELLITUS, WITH MUSCLE INVOLVEMENT WITHOUT EVIDENCE OF NECROSIS (HCC): ICD-10-CM

## 2021-09-25 DIAGNOSIS — E11.621 DIABETIC ULCER OF LEFT HEEL ASSOCIATED WITH TYPE 2 DIABETES MELLITUS, WITH NECROSIS OF BONE (HCC): ICD-10-CM

## 2021-09-25 DIAGNOSIS — L97.424 DIABETIC ULCER OF LEFT HEEL ASSOCIATED WITH TYPE 2 DIABETES MELLITUS, WITH NECROSIS OF BONE (HCC): ICD-10-CM

## 2021-09-25 PROCEDURE — G0463 HOSPITAL OUTPT CLINIC VISIT: HCPCS

## 2021-09-25 PROCEDURE — 97602 WOUND(S) CARE NON-SELECTIVE: CPT

## 2021-09-25 PROCEDURE — 96365 THER/PROPH/DIAG IV INF INIT: CPT

## 2021-09-25 PROCEDURE — 25010000002 ERTAPENEM PER 500 MG: Performed by: EMERGENCY MEDICINE

## 2021-09-25 RX ADMIN — ERTAPENEM 1 G: 1 INJECTION INTRAMUSCULAR; INTRAVENOUS at 11:23

## 2021-09-26 ENCOUNTER — HOSPITAL ENCOUNTER (OUTPATIENT)
Dept: INFUSION THERAPY | Facility: HOSPITAL | Age: 61
Discharge: HOME | End: 2021-09-26
Admitting: EMERGENCY MEDICINE

## 2021-09-26 VITALS
RESPIRATION RATE: 20 BRPM | HEART RATE: 67 BPM | TEMPERATURE: 97.8 F | SYSTOLIC BLOOD PRESSURE: 91 MMHG | OXYGEN SATURATION: 98 % | DIASTOLIC BLOOD PRESSURE: 77 MMHG

## 2021-09-26 DIAGNOSIS — L97.415 DIABETIC ULCER OF RIGHT MIDFOOT ASSOCIATED WITH TYPE 2 DIABETES MELLITUS, WITH MUSCLE INVOLVEMENT WITHOUT EVIDENCE OF NECROSIS (HCC): ICD-10-CM

## 2021-09-26 DIAGNOSIS — E11.621 DIABETIC ULCER OF RIGHT MIDFOOT ASSOCIATED WITH TYPE 2 DIABETES MELLITUS, WITH MUSCLE INVOLVEMENT WITHOUT EVIDENCE OF NECROSIS (HCC): ICD-10-CM

## 2021-09-26 DIAGNOSIS — E11.621 DIABETIC ULCER OF LEFT HEEL ASSOCIATED WITH TYPE 2 DIABETES MELLITUS, WITH NECROSIS OF BONE (HCC): ICD-10-CM

## 2021-09-26 DIAGNOSIS — L97.424 DIABETIC ULCER OF LEFT HEEL ASSOCIATED WITH TYPE 2 DIABETES MELLITUS, WITH NECROSIS OF BONE (HCC): ICD-10-CM

## 2021-09-26 DIAGNOSIS — M86.172 ACUTE OSTEOMYELITIS OF LEFT CALCANEUS (HCC): Primary | ICD-10-CM

## 2021-09-26 PROCEDURE — 96366 THER/PROPH/DIAG IV INF ADDON: CPT

## 2021-09-26 PROCEDURE — 96365 THER/PROPH/DIAG IV INF INIT: CPT

## 2021-09-26 PROCEDURE — 25010000002 ERTAPENEM PER 500 MG: Performed by: EMERGENCY MEDICINE

## 2021-09-26 RX ADMIN — ERTAPENEM 1 G: 1 INJECTION INTRAMUSCULAR; INTRAVENOUS at 11:21

## 2021-09-28 ENCOUNTER — HOSPITAL ENCOUNTER (OUTPATIENT)
Dept: INFUSION THERAPY | Facility: HOSPITAL | Age: 61
Setting detail: INFUSION SERIES
Discharge: HOME | End: 2021-09-28

## 2021-09-28 VITALS
TEMPERATURE: 97.8 F | HEART RATE: 70 BPM | RESPIRATION RATE: 20 BRPM | SYSTOLIC BLOOD PRESSURE: 126 MMHG | DIASTOLIC BLOOD PRESSURE: 91 MMHG | OXYGEN SATURATION: 98 %

## 2021-09-28 DIAGNOSIS — L97.424 DIABETIC ULCER OF LEFT HEEL ASSOCIATED WITH TYPE 2 DIABETES MELLITUS, WITH NECROSIS OF BONE (HCC): ICD-10-CM

## 2021-09-28 DIAGNOSIS — E11.621 DIABETIC ULCER OF RIGHT MIDFOOT ASSOCIATED WITH TYPE 2 DIABETES MELLITUS, WITH MUSCLE INVOLVEMENT WITHOUT EVIDENCE OF NECROSIS (HCC): ICD-10-CM

## 2021-09-28 DIAGNOSIS — L97.415 DIABETIC ULCER OF RIGHT MIDFOOT ASSOCIATED WITH TYPE 2 DIABETES MELLITUS, WITH MUSCLE INVOLVEMENT WITHOUT EVIDENCE OF NECROSIS (HCC): ICD-10-CM

## 2021-09-28 DIAGNOSIS — E11.621 DIABETIC ULCER OF LEFT HEEL ASSOCIATED WITH TYPE 2 DIABETES MELLITUS, WITH NECROSIS OF BONE (HCC): ICD-10-CM

## 2021-09-28 DIAGNOSIS — M86.172 ACUTE OSTEOMYELITIS OF LEFT CALCANEUS (HCC): Primary | ICD-10-CM

## 2021-09-28 PROCEDURE — 96365 THER/PROPH/DIAG IV INF INIT: CPT

## 2021-09-28 PROCEDURE — 97602 WOUND(S) CARE NON-SELECTIVE: CPT

## 2021-09-28 PROCEDURE — 25010000002 ERTAPENEM PER 500 MG: Performed by: EMERGENCY MEDICINE

## 2021-09-28 PROCEDURE — G0463 HOSPITAL OUTPT CLINIC VISIT: HCPCS

## 2021-09-28 RX ADMIN — ERTAPENEM 1 G: 1 INJECTION INTRAMUSCULAR; INTRAVENOUS at 11:15

## 2021-09-29 ENCOUNTER — PROCEDURE VISIT (OUTPATIENT)
Dept: WOUND CARE | Facility: HOSPITAL | Age: 61
End: 2021-09-29

## 2021-09-29 ENCOUNTER — HOSPITAL ENCOUNTER (OUTPATIENT)
Dept: INFUSION THERAPY | Facility: HOSPITAL | Age: 61
Setting detail: INFUSION SERIES
Discharge: HOME | End: 2021-09-29

## 2021-09-29 ENCOUNTER — OFFICE VISIT (OUTPATIENT)
Dept: WOUND CARE | Facility: HOSPITAL | Age: 61
End: 2021-09-29

## 2021-09-29 ENCOUNTER — NUTRITION (OUTPATIENT)
Dept: DIABETES SERVICES | Facility: HOSPITAL | Age: 61
End: 2021-09-29

## 2021-09-29 VITALS
SYSTOLIC BLOOD PRESSURE: 108 MMHG | TEMPERATURE: 96.8 F | DIASTOLIC BLOOD PRESSURE: 64 MMHG | RESPIRATION RATE: 18 BRPM | BODY MASS INDEX: 40.43 KG/M2 | WEIGHT: 315 LBS | HEART RATE: 68 BPM | HEIGHT: 74 IN

## 2021-09-29 VITALS
SYSTOLIC BLOOD PRESSURE: 105 MMHG | HEART RATE: 66 BPM | TEMPERATURE: 97.8 F | DIASTOLIC BLOOD PRESSURE: 63 MMHG | RESPIRATION RATE: 20 BRPM | OXYGEN SATURATION: 96 %

## 2021-09-29 DIAGNOSIS — L97.424 DIABETIC ULCER OF LEFT HEEL ASSOCIATED WITH TYPE 2 DIABETES MELLITUS, WITH NECROSIS OF BONE (HCC): ICD-10-CM

## 2021-09-29 DIAGNOSIS — E11.65 UNCONTROLLED TYPE 2 DIABETES MELLITUS WITH HYPERGLYCEMIA (HCC): Primary | ICD-10-CM

## 2021-09-29 DIAGNOSIS — E11.621 DIABETIC ULCER OF LEFT HEEL ASSOCIATED WITH TYPE 2 DIABETES MELLITUS, WITH NECROSIS OF BONE (HCC): ICD-10-CM

## 2021-09-29 DIAGNOSIS — L97.424 DIABETIC ULCER OF LEFT HEEL ASSOCIATED WITH TYPE 2 DIABETES MELLITUS, WITH NECROSIS OF BONE (HCC): Primary | ICD-10-CM

## 2021-09-29 DIAGNOSIS — L97.415 DIABETIC ULCER OF RIGHT MIDFOOT ASSOCIATED WITH TYPE 2 DIABETES MELLITUS, WITH MUSCLE INVOLVEMENT WITHOUT EVIDENCE OF NECROSIS (HCC): ICD-10-CM

## 2021-09-29 DIAGNOSIS — E11.621 DIABETIC ULCER OF LEFT HEEL ASSOCIATED WITH TYPE 2 DIABETES MELLITUS, WITH NECROSIS OF BONE (HCC): Primary | ICD-10-CM

## 2021-09-29 DIAGNOSIS — M86.8X7 OTHER OSTEOMYELITIS OF LEFT FOOT (HCC): ICD-10-CM

## 2021-09-29 DIAGNOSIS — E11.621 DIABETIC ULCER OF RIGHT MIDFOOT ASSOCIATED WITH TYPE 2 DIABETES MELLITUS, WITH MUSCLE INVOLVEMENT WITHOUT EVIDENCE OF NECROSIS (HCC): ICD-10-CM

## 2021-09-29 DIAGNOSIS — M86.172 ACUTE OSTEOMYELITIS OF LEFT CALCANEUS (HCC): Primary | ICD-10-CM

## 2021-09-29 DIAGNOSIS — E11.42 DIABETIC POLYNEUROPATHY ASSOCIATED WITH TYPE 2 DIABETES MELLITUS (HCC): ICD-10-CM

## 2021-09-29 LAB
GLUCOSE BLDC GLUCOMTR-MCNC: 152 MG/DL (ref 70–99)
GLUCOSE BLDC GLUCOMTR-MCNC: 161 MG/DL (ref 70–99)

## 2021-09-29 PROCEDURE — 82962 GLUCOSE BLOOD TEST: CPT

## 2021-09-29 PROCEDURE — 99183 HYPERBARIC OXYGEN THERAPY: CPT | Performed by: EMERGENCY MEDICINE

## 2021-09-29 PROCEDURE — 97802 MEDICAL NUTRITION INDIV IN: CPT | Performed by: DIETITIAN, REGISTERED

## 2021-09-29 PROCEDURE — 96365 THER/PROPH/DIAG IV INF INIT: CPT

## 2021-09-29 PROCEDURE — G0277 HBOT, FULL BODY CHAMBER, 30M: HCPCS

## 2021-09-29 PROCEDURE — 11042 DBRDMT SUBQ TIS 1ST 20SQCM/<: CPT | Performed by: EMERGENCY MEDICINE

## 2021-09-29 PROCEDURE — 97597 DBRDMT OPN WND 1ST 20 CM/<: CPT | Performed by: EMERGENCY MEDICINE

## 2021-09-29 PROCEDURE — 25010000002 ERTAPENEM PER 500 MG: Performed by: EMERGENCY MEDICINE

## 2021-09-29 RX ADMIN — ERTAPENEM 1 G: 1 INJECTION INTRAMUSCULAR; INTRAVENOUS at 12:51

## 2021-09-29 NOTE — PROGRESS NOTES
Chief Complaint  Wound Check (DM FOOT ULCERS, OSTEO)    Subjective        Wound Check      Patient is a 60-year-old type II diabetic with neuropathy and insulin dependence.  He has been followed in the wound care clinic for nonhealing ulcers on his left heel and right distal midfoot. MRI showed osteomyelitis of the left heel. He had a PICC line placed 08/20/2021 and is undergoing IV antibiotics daily (ertapenem).  He recently started to undergo hyperbaric oxygen therapy for his osteomyelitis and is tolerating it well.  He is trying to control his sugars better and really watch what he eats.  He says he has a hard time offloading the wounds because he still has to do his ADLs and go to the laundromat etc.    Wounds have been getting packed with Aquacel Ag. They seem to be improving. He reports that he has a busy day tomorrow with getting hyperbarics, IV antibiotics, and then seeing the diabetic nurse practitioner.  He says he has been continuing to try to manage his dietary intake and eating more plant-based foods and cutting out fried foods as much as possible to improve his glucose control.      Allergies:  Adhesive tape      Current Outpatient Medications:   •  alfuzosin (UROXATRAL) 10 MG 24 hr tablet, Take 10 mg by mouth Daily., Disp: , Rfl:   •  apixaban (Eliquis) 5 MG tablet tablet, Take 10 mg by mouth 2 (two) times a day., Disp: , Rfl:   •  buPROPion XL (WELLBUTRIN XL) 300 MG 24 hr tablet, Take 300 mg by mouth 2 (Two) Times a Day., Disp: , Rfl:   •  citalopram (CeleXA) 10 MG tablet, TAKE (1) TABLET BY MOUTH 1 TIME PER DAY AT BEDTIME, Disp: , Rfl:   •  Dulaglutide (Trulicity) 0.75 MG/0.5ML solution pen-injector, Inject 0.75 mg under the skin into the appropriate area as directed Every 7 (Seven) Days for 4 doses., Disp: 2 mL, Rfl: 1  •  glucose blood test strip, Test blood sugar 3 times a day, Disp: 100 each, Rfl: 5  •  insulin aspart (NovoLOG) 100 UNIT/ML injection, Inject 20 Units under the skin into the  appropriate area as directed 3 (Three) Times a Day Before Meals. Take up to 20 units as instructed, Disp: 20 mL, Rfl: 3  •  insulin detemir (LEVEMIR) 100 UNIT/ML injection, Inject 50 Units under the skin into the appropriate area as directed Every Night. Take 40 units and adjust to 50 as instructed, Disp: 20 mL, Rfl: 3  •  lisinopril (PRINIVIL,ZESTRIL) 20 MG tablet, Take 20 mg by mouth Daily., Disp: , Rfl:   •  metFORMIN (GLUCOPHAGE) 1000 MG tablet, Take 1,000 mg by mouth 2 (two) times a day., Disp: , Rfl:   •  metoprolol succinate XL (TOPROL-XL) 50 MG 24 hr tablet, Take 50 mg by mouth Daily., Disp: , Rfl:   •  sertraline (ZOLOFT) 100 MG tablet, Take 100 mg by mouth Daily., Disp: , Rfl:   •  simvastatin (Zocor) 20 MG tablet, Take 20 mg by mouth Every Night., Disp: , Rfl:   No current facility-administered medications for this visit.    Past Medical History:   Diagnosis Date   • Absence of toe of right foot    • Acute osteomyelitis of left calcaneus  8/18/2021   • Anxiety and depression    • Arthritis    • Claustrophobia    • Corns and callus    • Diabetic ulcer of left heel associated with type 2 DM 8/18/2021   • Diabetic ulcer of left heel associated with type 2 DM 7/6/2021   • Diabetic ulcer of right midfoot associated with type 2 DM 8/18/2021   • Difficulty walking    • Essential hypertension 8/31/2021   • Hammertoe    • Hyperlipidemia LDL goal <100 8/31/2021   • Ingrown toenail    • Obesity    • Paroxysmal atrial fibrillation 8/31/2021   • Polyneuropathy    • Pressure ulcer, stage 1    • Tinea unguium    • Type 2 diabetes mellitus with polyneuropathy      Past Surgical History:   Procedure Laterality Date   • CYST REMOVAL      center of back; benign   • INCISION AND DRAINAGE ABSCESS      back   • OTHER SURGICAL HISTORY      Surgical clips left foot   • TOE SURGERY Right     Removal of 5th toe   • WRIST SURGERY Left     repair of injury     Social History     Socioeconomic History   • Marital status: Single      "Spouse name: Not on file   • Number of children: Not on file   • Years of education: Not on file   • Highest education level: Not on file   Tobacco Use   • Smoking status: Former Smoker     Packs/day: 1.00     Types: Cigarettes     Quit date: 1991     Years since quittin.1   • Smokeless tobacco: Never Used   Vaping Use   • Vaping Use: Never used   Substance and Sexual Activity   • Alcohol use: Yes     Comment: Rare   • Drug use: Not Currently   • Sexual activity: Defer     Family History   Problem Relation Age of Onset   • Heart disease Mother    • Heart disease Father    • Cancer Father         Unspecified   • Heart disease Brother          Objective     Vitals:    21 0857   BP: 108/60   BP Location: Left arm   Patient Position: Sitting   Pulse: 66   Resp: 18   Temp: 97.3 °F (36.3 °C)   TempSrc: Temporal   Weight: (!) 166 kg (365 lb)   Height: 188 cm (74\")   PainSc: 10-Worst pain ever     Body mass index is 46.86 kg/m².    STEADI Fall Risk Assessment has not been completed.     Review of Systems     ROS:  No fevers, chills, sweats, or body aches. No shortness of breath.     I have reviewed the HPI and ROS as documented by MA/RN. Shea Daniels MD    Physical Exam     NAD, well dressed, well nourished  Normocephalic, atraumatic  EOMI, no scleral icterus  No respiratory distress, no cough  AAOx3, pleasant, cooperative  Callus to BLE plantar foot ulcers.  The LLE heel wound to look smaller and healthier although there continues to be some devitalized muscle in the base of the wound.  RLE plantar wound with decreased callus buildup, and some persistent devitalized tissue in the base, but overall improving.  No undermining of RLE wound today.  Wounds are getting shallower and smaller.   No erythema, purulence, induration, or fluctuance.  LLE wound on heel remains tender with pressure but not with sharp stimuli.  See photos below for details.                  Result Review :  The following data was " reviewed by: Shea Daniels MD on 09/16/2021:    Prior wound photos, prior office notes.  Nursing measurements.      Wound debridement  Performed by: Shea Daniels MD  Authorized by: Shea Daniels MD     Correct patient: Identification verified by two methods:     Verbally and Date of birth  Correct procedure/consent    Correct site/side    Correct equipment as applicable    How many wounds are you performing a debridement on?:  2  Wound 1:     Nursing Documentation:     Wound Location:  Left plantar heel  Measurements:     The total amount debrided on this wound was under 20 cm2.     Provider Documentation    Debridement type:  Sharp/excisional    Betadine      Other:  Sterile 10 blade scalpel     None    Tissue debrided:  Small    Type tissue:  Other    Other tissue type:  Devitalized skin and subcutaneous tissues    Level removed:  Subcutaneous    Patient tolerance:  Good    Hemostasis achieved by:  Direct pressure    Wound Bed Post Debridement: See Photo    Wound 2:     Wound Location:  Right plantar foot   Measurements:    The total amount debrided on this wound was under 20 cm2.      Provider Documentation:     Debridement type:  Sharp/Excisional    Betadine      Other:  Sterile 10 blade scalpel     None    Tissue debrided:  Small    Type tissue:  Other    Other tissue type:  Periwound callus and devitalized epidermis and dermis    Level removed:  Skin    Patient tolerance:  Good    Wound Bed Post Debridement: See Photo        Post debridement photos:                   Assessment and Plan   Diagnoses and all orders for this visit:    1. 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 Other osteomyelitis of left foot (HCC)    3. Diabetic ulcer of right midfoot associated with type 2 diabetes mellitus, with muscle involvement without evidence of necrosis (HCC)    4. Diabetic polyneuropathy associated with type 2 diabetes mellitus  (HCC)    Other orders  -     Wound debridement         Continue full course of hyperbaric oxygen therapy.  Continue IV antibiotics via PICC line.  He states that his last dose is on Friday.  Continue Aquacel Ag to wounds, they appear to be gradually improving at each visit.      Recheck in 1 week.  Again advised to offload the wounds, particularly on the left heel, but the patient says this is hard to do.    Patient was given instructions and counseling regarding their condition or for health maintenance advice, as well as the wound care plan and recommendations. They understand and agree with the plan.  They will follow back up here in the clinic but are instructed to contact us in the interim should they have any new, returning, or worsening symptoms or concerns. Please see specific information pulled into the AVS if appropriate.       Follow Up   Return in about 1 week (around 10/6/2021) for Recheck.      Shea Daniels MD

## 2021-09-29 NOTE — PROCEDURES
Procedure   WOUND CARE HYPERBARIC  Performed by: Shea Daniels MD  Authorized by: Shea Daniels MD     Treatment number: 8   Mobility  The following were used for the patient's mobility: ambulatory  The patient appears alert and oriented  The lungs are clear.   TM's:     Right Ear:     -Intact      Left Ear:     -Intact  Pre-treatment Actions   The patient was voided/has a catheter.    Safety   A grounding device was attached.  The safety checklist was reviewed.   We identified the patient.  We identified the patient using the following methods: Photo ID, verbally and date of birth.    Treatment Profile: 2.0   The total time of the procedure was 110 minutes.   Pressure    The patient's Leave Surface Rate was 9:46 PSI / minutes. The patient's Reach Bottom was 9:56 minutes. The patient's Leave Bottom Rate was 11:26 PSI / minutes. The patient's Return Surface was 11:36 minutes.   Post-Treatment Vital Signs and Assessment   Lungs:  Clear  Right TMs:  Intact  Left TMs:  Intact

## 2021-09-30 ENCOUNTER — HOSPITAL ENCOUNTER (OUTPATIENT)
Dept: INFUSION THERAPY | Facility: HOSPITAL | Age: 61
Setting detail: INFUSION SERIES
Discharge: HOME | End: 2021-09-30

## 2021-09-30 ENCOUNTER — PROCEDURE VISIT (OUTPATIENT)
Dept: WOUND CARE | Facility: HOSPITAL | Age: 61
End: 2021-09-30

## 2021-09-30 VITALS
SYSTOLIC BLOOD PRESSURE: 99 MMHG | RESPIRATION RATE: 20 BRPM | OXYGEN SATURATION: 97 % | TEMPERATURE: 98.8 F | DIASTOLIC BLOOD PRESSURE: 50 MMHG | HEART RATE: 71 BPM

## 2021-09-30 VITALS
TEMPERATURE: 97.1 F | HEART RATE: 71 BPM | DIASTOLIC BLOOD PRESSURE: 62 MMHG | RESPIRATION RATE: 16 BRPM | SYSTOLIC BLOOD PRESSURE: 106 MMHG

## 2021-09-30 VITALS — HEIGHT: 74 IN | WEIGHT: 315 LBS | BODY MASS INDEX: 40.43 KG/M2

## 2021-09-30 DIAGNOSIS — M86.172 ACUTE OSTEOMYELITIS OF LEFT CALCANEUS (HCC): Primary | ICD-10-CM

## 2021-09-30 DIAGNOSIS — E11.621 DIABETIC ULCER OF LEFT HEEL ASSOCIATED WITH TYPE 2 DIABETES MELLITUS, WITH NECROSIS OF BONE (HCC): ICD-10-CM

## 2021-09-30 DIAGNOSIS — L97.424 DIABETIC ULCER OF LEFT HEEL ASSOCIATED WITH TYPE 2 DIABETES MELLITUS, WITH NECROSIS OF BONE (HCC): ICD-10-CM

## 2021-09-30 DIAGNOSIS — E11.621 DIABETIC ULCER OF RIGHT MIDFOOT ASSOCIATED WITH TYPE 2 DIABETES MELLITUS, WITH MUSCLE INVOLVEMENT WITHOUT EVIDENCE OF NECROSIS (HCC): ICD-10-CM

## 2021-09-30 DIAGNOSIS — M86.8X7 OTHER OSTEOMYELITIS OF LEFT FOOT (HCC): ICD-10-CM

## 2021-09-30 DIAGNOSIS — E11.621 DIABETIC ULCER OF LEFT HEEL ASSOCIATED WITH TYPE 2 DIABETES MELLITUS, WITH NECROSIS OF BONE (HCC): Primary | ICD-10-CM

## 2021-09-30 DIAGNOSIS — L97.424 DIABETIC ULCER OF LEFT HEEL ASSOCIATED WITH TYPE 2 DIABETES MELLITUS, WITH NECROSIS OF BONE (HCC): Primary | ICD-10-CM

## 2021-09-30 DIAGNOSIS — L97.415 DIABETIC ULCER OF RIGHT MIDFOOT ASSOCIATED WITH TYPE 2 DIABETES MELLITUS, WITH MUSCLE INVOLVEMENT WITHOUT EVIDENCE OF NECROSIS (HCC): ICD-10-CM

## 2021-09-30 LAB
GLUCOSE BLDC GLUCOMTR-MCNC: 141 MG/DL (ref 70–99)
GLUCOSE BLDC GLUCOMTR-MCNC: 141 MG/DL (ref 70–99)

## 2021-09-30 PROCEDURE — G0277 HBOT, FULL BODY CHAMBER, 30M: HCPCS

## 2021-09-30 PROCEDURE — G0463 HOSPITAL OUTPT CLINIC VISIT: HCPCS

## 2021-09-30 PROCEDURE — 97602 WOUND(S) CARE NON-SELECTIVE: CPT

## 2021-09-30 PROCEDURE — 96365 THER/PROPH/DIAG IV INF INIT: CPT

## 2021-09-30 PROCEDURE — 25010000002 ERTAPENEM PER 500 MG: Performed by: EMERGENCY MEDICINE

## 2021-09-30 PROCEDURE — 99183 HYPERBARIC OXYGEN THERAPY: CPT | Performed by: EMERGENCY MEDICINE

## 2021-09-30 PROCEDURE — 82962 GLUCOSE BLOOD TEST: CPT

## 2021-09-30 RX ADMIN — ERTAPENEM 1 G: 1 INJECTION INTRAMUSCULAR; INTRAVENOUS at 11:51

## 2021-09-30 NOTE — PROGRESS NOTES
"Jose Shaikh presents to [facility] for nutrition consult r/t diagnosis of T2DM.     General Information  General Information   Referral From:: [place]  Height: 188 cm (74\")  Height Method: Actual  Weight: (!) 166 kg (365 lb 15.4 oz)  Weight Method: Stated  Have you had weight changes?: Yes  Describe weight changes: Pt states over the last year he has lost 55#  Is patient pregnant?: n/a    Diabetes History  Diabetes History  What type of diabetes do you have?: Type 2  Length of Diabetes Diagnosis: 10 + years  Current DM knowledge: good  Have you had diabetes education/teaching in the past?: yes  Do you test your blood sugar at home?: yes  Typical readings: 120-170  Do you have any diabetes complications?: foot ulcers    Education Preferences  Education Preferences  What areas of diabetes would you like to learn about?: diet information    Nutrition Information  Nutrition Information  Enter everything you can remember eating in the last 24 hours (1 day): breakfast- pt skips; lunch- eldridge beans; dinner- chili; snacks- PB and celery; beverages- Pepsi Zero, water; pt states he eats some meats but often chooses plant based meats  How many meals do you eat each day?: 2  How many snacks do you eat each day?: 1  What is the biggest challenge you have with your diet?: Knowledge    Education Needs  DM Education Needs  Meter: Has own  Medication: Oral, Insulin  Healthy Eating: RD consult, Reviewed meal plan, Basic meal plan provided  Motivation: Engaged  Teaching Method: Explanation, Discussion, Handouts  Patient Response: Verbalized understanding    Medications  Current meds for glucose mgmt include Metformin and 70/30 insulin.  Pt is followed by VALENTÍN Francois for DM med mgmt.    Labs   Lab Results   Component Value Date    HGBA1C 8.50 (H) 08/24/2021        Nutrition counseling provided on carbohydrate counting, portion control, measuring and reading labels. Discussed eating out and " gave suggestions on controlling carbohydrate intake and making healthier food choices.     Meal Plan:   Total Carbohydrates per meal: 3-4 carb servings/meal, at least 3 meals/day  Lean protein with meals.  Limit added fats.  Snacks: 1 carbohydrate serving (</= 22 g) + 1 protein serving.     Daily exercise encouraged (as recommended by healthcare provider). Discussed the befits of exercise in lowering blood glucose, blood pressure, cholesterol, stress and controlling body weight.     Advised to continue daily blood glucose monitoring to assist with understanding of factors affecting blood glucose and assist with management of diabetes.  Discussed and provided with target BG ranges.     Literature provided: Diabetes Nutrition Placemat, Choose Your Foods Booklet    Phone number provided and encouraged to call with questions or concerns.     Time spent with patient: 30 minutes    Uyen Yarbrough RDN, LD  09/29/2021

## 2021-09-30 NOTE — PROGRESS NOTES
Patient tolerated HBO treatment well and there were no complications.  I was present throughout the duration of the treatment for direct supervision. Order to d/c PICC line after final Abx placed.

## 2021-09-30 NOTE — PROCEDURES
Vitals:    09/30/21 1319   BP: 106/62  Comment: pre treatment   BP Location: Left arm   Patient Position: Sitting   Pulse: 71   Resp: 16   Temp: 97.1 °F (36.2 °C)   PainSc: 0-No pain        Procedure   WOUND CARE HYPERBARIC  Performed by: Shea Daniels MD  Authorized by: Shea Daniels MD     Treatment number: 9   Mobility  The following were used for the patient's mobility: ambulatory  The patient appears oriented  The lungs are clear.   TM's:     Right Ear:     -Intact      Left Ear:     -Intact  Pre-treatment Actions   The patient was voided/has a catheter.    Safety   A grounding device was attached.  The safety checklist was reviewed.   We identified the patient.  We identified the patient using the following methods: Photo ID, verbally and date of birth.    Treatment Profile: 2.0   The total time of the procedure was 110 minutes.   Pressure    The patient's Leave Surface Rate was 9:02 PSI / minutes. The patient's Reach Bottom was 9:12 minutes. The patient's Leave Bottom Rate was 10:42 PSI / minutes. The patient's Return Surface was 10:52 minutes.   Post-Treatment Vital Signs and Assessment   Lungs:  Clear  Right TMs:  Intact  Left TMs:  Intact           Results for orders placed or performed in visit on 09/30/21   1. POC Glucose (Collected: 9/30/2021 10:58 AM)    Specimen: Blood   Result Value Ref Range    Glucose 141 (H) 70 - 99 mg/dL

## 2021-10-01 ENCOUNTER — TRANSCRIBE ORDERS (OUTPATIENT)
Dept: ADMINISTRATIVE | Facility: HOSPITAL | Age: 61
End: 2021-10-01

## 2021-10-01 ENCOUNTER — HOSPITAL ENCOUNTER (OUTPATIENT)
Dept: INFUSION THERAPY | Facility: HOSPITAL | Age: 61
Setting detail: INFUSION SERIES
Discharge: HOME | End: 2021-10-01

## 2021-10-01 ENCOUNTER — PROCEDURE VISIT (OUTPATIENT)
Dept: WOUND CARE | Facility: HOSPITAL | Age: 61
End: 2021-10-01

## 2021-10-01 VITALS
RESPIRATION RATE: 20 BRPM | OXYGEN SATURATION: 98 % | HEART RATE: 90 BPM | SYSTOLIC BLOOD PRESSURE: 103 MMHG | TEMPERATURE: 97.6 F | DIASTOLIC BLOOD PRESSURE: 63 MMHG

## 2021-10-01 VITALS
DIASTOLIC BLOOD PRESSURE: 62 MMHG | TEMPERATURE: 97.1 F | SYSTOLIC BLOOD PRESSURE: 108 MMHG | HEART RATE: 65 BPM | RESPIRATION RATE: 16 BRPM

## 2021-10-01 DIAGNOSIS — E11.621 DIABETIC ULCER OF LEFT HEEL ASSOCIATED WITH TYPE 2 DIABETES MELLITUS, WITH NECROSIS OF BONE (HCC): ICD-10-CM

## 2021-10-01 DIAGNOSIS — M86.172 ACUTE OSTEOMYELITIS OF LEFT CALCANEUS (HCC): Primary | ICD-10-CM

## 2021-10-01 DIAGNOSIS — Z51.89 ENCOUNTER FOR WOUND CARE: Primary | ICD-10-CM

## 2021-10-01 DIAGNOSIS — M86.8X7 OTHER OSTEOMYELITIS OF LEFT FOOT (HCC): ICD-10-CM

## 2021-10-01 DIAGNOSIS — L97.424 DIABETIC ULCER OF LEFT HEEL ASSOCIATED WITH TYPE 2 DIABETES MELLITUS, WITH NECROSIS OF BONE (HCC): ICD-10-CM

## 2021-10-01 DIAGNOSIS — L97.415 DIABETIC ULCER OF RIGHT MIDFOOT ASSOCIATED WITH TYPE 2 DIABETES MELLITUS, WITH MUSCLE INVOLVEMENT WITHOUT EVIDENCE OF NECROSIS (HCC): ICD-10-CM

## 2021-10-01 DIAGNOSIS — E11.621 DIABETIC ULCER OF RIGHT MIDFOOT ASSOCIATED WITH TYPE 2 DIABETES MELLITUS, WITH MUSCLE INVOLVEMENT WITHOUT EVIDENCE OF NECROSIS (HCC): ICD-10-CM

## 2021-10-01 LAB
GLUCOSE BLDC GLUCOMTR-MCNC: 127 MG/DL (ref 70–99)
GLUCOSE BLDC GLUCOMTR-MCNC: 151 MG/DL (ref 70–99)

## 2021-10-01 PROCEDURE — G0463 HOSPITAL OUTPT CLINIC VISIT: HCPCS

## 2021-10-01 PROCEDURE — 96365 THER/PROPH/DIAG IV INF INIT: CPT

## 2021-10-01 PROCEDURE — 97602 WOUND(S) CARE NON-SELECTIVE: CPT

## 2021-10-01 PROCEDURE — 82962 GLUCOSE BLOOD TEST: CPT | Performed by: EMERGENCY MEDICINE

## 2021-10-01 PROCEDURE — G0277 HBOT, FULL BODY CHAMBER, 30M: HCPCS

## 2021-10-01 PROCEDURE — 25010000002 ERTAPENEM PER 500 MG: Performed by: EMERGENCY MEDICINE

## 2021-10-01 PROCEDURE — 99183 HYPERBARIC OXYGEN THERAPY: CPT | Performed by: EMERGENCY MEDICINE

## 2021-10-01 RX ADMIN — ERTAPENEM 1 G: 1 INJECTION INTRAMUSCULAR; INTRAVENOUS at 11:48

## 2021-10-01 NOTE — PROCEDURES
Vitals:    10/01/21 0845   BP: 114/66  Comment: pre treatment   BP Location: Left arm   Patient Position: Sitting   Pulse: 72   Resp: 16   Temp: 97 °F (36.1 °C)   TempSrc: Temporal   PainSc: 0-No pain        Procedure   WOUND CARE HYPERBARIC  Performed by: Shea Daniels MD  Authorized by: Shea Daniels MD     Treatment number: 10   Mobility  The following were used for the patient's mobility: ambulatory  The patient appears alert and oriented  The lungs are clear.   TM's:     Left Ear:     -Intact  Pre-treatment Actions   The patient was voided/has a catheter.    Safety   A grounding device was attached.  The safety checklist was reviewed.   We identified the patient.  We identified the patient using the following methods: Photo ID, verbally and date of birth.    Treatment Profile: 2.0   The total time of the procedure was 110 minutes.   Pressure    The patient's Leave Surface Rate was 9:00 PSI / minutes. The patient's Reach Bottom was 9:10 minutes. The patient's Leave Bottom Rate was 10:40 PSI / minutes. The patient's Return Surface was 10:50 minutes.   Post-Treatment Vital Signs and Assessment   Lungs:  Clear  Right TMs:  Intact  Left TMs:  Intact           Results for orders placed or performed in visit on 10/01/21   1. POC Glucose (Collected: 10/1/2021 11:00 AM)    Specimen: Blood   Result Value Ref Range    Glucose 151 (H) 70 - 99 mg/dL

## 2021-10-02 ENCOUNTER — APPOINTMENT (OUTPATIENT)
Dept: INFUSION THERAPY | Facility: HOSPITAL | Age: 61
End: 2021-10-02

## 2021-10-02 ENCOUNTER — HOSPITAL ENCOUNTER (OUTPATIENT)
Dept: INFUSION THERAPY | Facility: HOSPITAL | Age: 61
Setting detail: INFUSION SERIES
Discharge: HOME | End: 2021-10-02

## 2021-10-02 VITALS — TEMPERATURE: 97.3 F | HEART RATE: 65 BPM | DIASTOLIC BLOOD PRESSURE: 53 MMHG | SYSTOLIC BLOOD PRESSURE: 121 MMHG

## 2021-10-02 DIAGNOSIS — L97.415 DIABETIC ULCER OF RIGHT MIDFOOT ASSOCIATED WITH TYPE 2 DIABETES MELLITUS, WITH MUSCLE INVOLVEMENT WITHOUT EVIDENCE OF NECROSIS (HCC): ICD-10-CM

## 2021-10-02 DIAGNOSIS — E11.621 DIABETIC ULCER OF LEFT HEEL ASSOCIATED WITH TYPE 2 DIABETES MELLITUS, WITH NECROSIS OF BONE (HCC): ICD-10-CM

## 2021-10-02 DIAGNOSIS — E11.621 DIABETIC ULCER OF LEFT HEEL ASSOCIATED WITH TYPE 2 DIABETES MELLITUS, WITH FAT LAYER EXPOSED (HCC): ICD-10-CM

## 2021-10-02 DIAGNOSIS — L97.424 DIABETIC ULCER OF LEFT HEEL ASSOCIATED WITH TYPE 2 DIABETES MELLITUS, WITH NECROSIS OF BONE (HCC): ICD-10-CM

## 2021-10-02 DIAGNOSIS — L97.422 DIABETIC ULCER OF LEFT HEEL ASSOCIATED WITH TYPE 2 DIABETES MELLITUS, WITH FAT LAYER EXPOSED (HCC): ICD-10-CM

## 2021-10-02 DIAGNOSIS — E11.621 DIABETIC ULCER OF RIGHT MIDFOOT ASSOCIATED WITH TYPE 2 DIABETES MELLITUS, WITH MUSCLE INVOLVEMENT WITHOUT EVIDENCE OF NECROSIS (HCC): ICD-10-CM

## 2021-10-02 DIAGNOSIS — M86.172 ACUTE OSTEOMYELITIS OF LEFT CALCANEUS (HCC): ICD-10-CM

## 2021-10-02 PROCEDURE — G0463 HOSPITAL OUTPT CLINIC VISIT: HCPCS

## 2021-10-02 PROCEDURE — 97602 WOUND(S) CARE NON-SELECTIVE: CPT

## 2021-10-02 NOTE — ADDENDUM NOTE
Encounter addended by: Megan Walker RN on: 10/2/2021 12:09 PM   Actions taken: Flowsheet data copied forward, Flowsheet accepted

## 2021-10-04 ENCOUNTER — PROCEDURE VISIT (OUTPATIENT)
Dept: WOUND CARE | Facility: HOSPITAL | Age: 61
End: 2021-10-04

## 2021-10-04 ENCOUNTER — HOSPITAL ENCOUNTER (OUTPATIENT)
Dept: INFUSION THERAPY | Facility: HOSPITAL | Age: 61
Setting detail: INFUSION SERIES
Discharge: HOME | End: 2021-10-04

## 2021-10-04 VITALS
DIASTOLIC BLOOD PRESSURE: 79 MMHG | RESPIRATION RATE: 20 BRPM | OXYGEN SATURATION: 99 % | TEMPERATURE: 98.1 F | HEART RATE: 82 BPM | SYSTOLIC BLOOD PRESSURE: 143 MMHG

## 2021-10-04 VITALS
RESPIRATION RATE: 16 BRPM | SYSTOLIC BLOOD PRESSURE: 122 MMHG | DIASTOLIC BLOOD PRESSURE: 70 MMHG | TEMPERATURE: 97 F | HEART RATE: 71 BPM

## 2021-10-04 DIAGNOSIS — E11.621 DIABETIC ULCER OF LEFT HEEL ASSOCIATED WITH TYPE 2 DIABETES MELLITUS, WITH NECROSIS OF BONE (HCC): ICD-10-CM

## 2021-10-04 DIAGNOSIS — L97.424 DIABETIC ULCER OF LEFT HEEL ASSOCIATED WITH TYPE 2 DIABETES MELLITUS, WITH NECROSIS OF BONE (HCC): Primary | ICD-10-CM

## 2021-10-04 DIAGNOSIS — M86.8X7 OTHER OSTEOMYELITIS OF LEFT FOOT (HCC): ICD-10-CM

## 2021-10-04 DIAGNOSIS — E11.621 DIABETIC ULCER OF LEFT HEEL ASSOCIATED WITH TYPE 2 DIABETES MELLITUS, WITH NECROSIS OF BONE (HCC): Primary | ICD-10-CM

## 2021-10-04 DIAGNOSIS — E11.621 DIABETIC ULCER OF RIGHT MIDFOOT ASSOCIATED WITH TYPE 2 DIABETES MELLITUS, WITH MUSCLE INVOLVEMENT WITHOUT EVIDENCE OF NECROSIS (HCC): ICD-10-CM

## 2021-10-04 DIAGNOSIS — M86.172 ACUTE OSTEOMYELITIS OF LEFT CALCANEUS (HCC): Primary | ICD-10-CM

## 2021-10-04 DIAGNOSIS — L97.415 DIABETIC ULCER OF RIGHT MIDFOOT ASSOCIATED WITH TYPE 2 DIABETES MELLITUS, WITH MUSCLE INVOLVEMENT WITHOUT EVIDENCE OF NECROSIS (HCC): ICD-10-CM

## 2021-10-04 DIAGNOSIS — L97.424 DIABETIC ULCER OF LEFT HEEL ASSOCIATED WITH TYPE 2 DIABETES MELLITUS, WITH NECROSIS OF BONE (HCC): ICD-10-CM

## 2021-10-04 LAB
GLUCOSE BLDC GLUCOMTR-MCNC: 168 MG/DL (ref 70–99)
GLUCOSE BLDC GLUCOMTR-MCNC: 168 MG/DL (ref 70–99)

## 2021-10-04 PROCEDURE — 82962 GLUCOSE BLOOD TEST: CPT

## 2021-10-04 PROCEDURE — 97602 WOUND(S) CARE NON-SELECTIVE: CPT

## 2021-10-04 PROCEDURE — 99183 HYPERBARIC OXYGEN THERAPY: CPT | Performed by: SURGERY

## 2021-10-04 PROCEDURE — G0277 HBOT, FULL BODY CHAMBER, 30M: HCPCS

## 2021-10-04 PROCEDURE — G0463 HOSPITAL OUTPT CLINIC VISIT: HCPCS

## 2021-10-04 NOTE — PROCEDURES
Vitals:    10/04/21 0845   BP: 124/68  Comment: pre treatment   BP Location: Left arm   Patient Position: Sitting   Pulse: 89   Resp: 16   Temp: 97.4 °F (36.3 °C)   TempSrc: Temporal   PainSc: 0-No pain        Procedure   WOUND CARE HYPERBARIC  Performed by: Narayan Dumont MD  Authorized by: Narayan Dumont MD     Treatment number: 11   Mobility  The following were used for the patient's mobility: ambulatory  The patient appears alert and oriented  The lungs are clear.   TM's:     Right Ear:     -Intact      Left Ear:     -Intact  Pre-treatment Actions   The patient was voided/has a catheter.    Safety   A grounding device was attached.  The safety checklist was reviewed.   We identified the patient.  We identified the patient using the following methods: Photo ID, verbally and date of birth.    Treatment Profile: 2.0   The total time of the procedure was 110 minutes.   Pressure    The patient's Leave Surface Rate was 8:59 PSI / minutes. The patient's Reach Bottom was 9:09 minutes. The patient's Leave Bottom Rate was 10:39 PSI / minutes. The patient's Return Surface was 10:49 minutes.   Post-Treatment Vital Signs and Assessment   Lungs:  Clear  Right TMs:  Intact  Left TMs:  Intact           Results for orders placed or performed in visit on 10/04/21   1. POC Glucose (Collected: 10/4/2021 10:57 AM)    Specimen: Blood   Result Value Ref Range    Glucose 168 (H) 70 - 99 mg/dL

## 2021-10-05 ENCOUNTER — PROCEDURE VISIT (OUTPATIENT)
Dept: WOUND CARE | Facility: HOSPITAL | Age: 61
End: 2021-10-05

## 2021-10-05 ENCOUNTER — HOSPITAL ENCOUNTER (OUTPATIENT)
Dept: INFUSION THERAPY | Facility: HOSPITAL | Age: 61
Setting detail: INFUSION SERIES
Discharge: HOME | End: 2021-10-05

## 2021-10-05 VITALS
RESPIRATION RATE: 16 BRPM | TEMPERATURE: 96.9 F | DIASTOLIC BLOOD PRESSURE: 80 MMHG | SYSTOLIC BLOOD PRESSURE: 122 MMHG | HEART RATE: 96 BPM

## 2021-10-05 VITALS
OXYGEN SATURATION: 98 % | SYSTOLIC BLOOD PRESSURE: 115 MMHG | HEART RATE: 73 BPM | TEMPERATURE: 97.7 F | DIASTOLIC BLOOD PRESSURE: 65 MMHG | RESPIRATION RATE: 20 BRPM

## 2021-10-05 DIAGNOSIS — M86.172 ACUTE OSTEOMYELITIS OF LEFT CALCANEUS (HCC): Primary | ICD-10-CM

## 2021-10-05 DIAGNOSIS — E11.621 DIABETIC ULCER OF LEFT HEEL ASSOCIATED WITH TYPE 2 DIABETES MELLITUS, WITH NECROSIS OF BONE (HCC): ICD-10-CM

## 2021-10-05 DIAGNOSIS — L97.424 DIABETIC ULCER OF LEFT HEEL ASSOCIATED WITH TYPE 2 DIABETES MELLITUS, WITH NECROSIS OF BONE (HCC): ICD-10-CM

## 2021-10-05 DIAGNOSIS — M86.8X7 OTHER OSTEOMYELITIS OF LEFT FOOT (HCC): ICD-10-CM

## 2021-10-05 DIAGNOSIS — E11.621 DIABETIC ULCER OF RIGHT MIDFOOT ASSOCIATED WITH TYPE 2 DIABETES MELLITUS, WITH MUSCLE INVOLVEMENT WITHOUT EVIDENCE OF NECROSIS (HCC): ICD-10-CM

## 2021-10-05 DIAGNOSIS — L97.415 DIABETIC ULCER OF RIGHT MIDFOOT ASSOCIATED WITH TYPE 2 DIABETES MELLITUS, WITH MUSCLE INVOLVEMENT WITHOUT EVIDENCE OF NECROSIS (HCC): ICD-10-CM

## 2021-10-05 LAB
GLUCOSE BLDC GLUCOMTR-MCNC: 116 MG/DL (ref 70–99)
GLUCOSE BLDC GLUCOMTR-MCNC: 143 MG/DL (ref 70–99)

## 2021-10-05 PROCEDURE — 97602 WOUND(S) CARE NON-SELECTIVE: CPT

## 2021-10-05 PROCEDURE — 82962 GLUCOSE BLOOD TEST: CPT

## 2021-10-05 PROCEDURE — 99183 HYPERBARIC OXYGEN THERAPY: CPT | Performed by: EMERGENCY MEDICINE

## 2021-10-05 PROCEDURE — G0463 HOSPITAL OUTPT CLINIC VISIT: HCPCS

## 2021-10-05 PROCEDURE — G0277 HBOT, FULL BODY CHAMBER, 30M: HCPCS

## 2021-10-05 NOTE — PROCEDURES
Vitals:    10/05/21 0845   BP: 124/82  Comment: pre treatment   BP Location: Left arm   Patient Position: Sitting   Pulse: 71   Resp: 16   Temp: 96.6 °F (35.9 °C)   TempSrc: Temporal   PainSc: 0-No pain        Procedure   WOUND CARE HYPERBARIC  Performed by: Shea Daniels MD  Authorized by: Narayan Dumont MD     Treatment number: 12   Mobility  The following were used for the patient's mobility: ambulatory  The patient appears oriented  The lungs are clear.   TM's:     Right Ear:     -Intact      Left Ear:     -Intact  Pre-treatment Actions   The patient was voided/has a catheter.    Safety   The safety checklist was reviewed.   We identified the patient.  We identified the patient using the following methods: Photo ID, verbally and date of birth.    Treatment Profile: 2.0   The total time of the procedure was 110 minutes.   Pressure    The patient's Leave Surface Rate was 8:57 PSI / minutes. The patient's Reach Bottom was 9:07 minutes. The patient's Leave Bottom Rate was 10:37 PSI / minutes. The patient's Return Surface was 10:47 minutes.   Post-Treatment Vital Signs and Assessment   Lungs:  Clear  Right TMs:  Intact  Left TMs:  Intact           Results for orders placed or performed in visit on 10/05/21   1. POC Glucose (Collected: 10/5/2021 10:59 AM)    Specimen: Blood   Result Value Ref Range    Glucose 116 (H) 70 - 99 mg/dL

## 2021-10-06 ENCOUNTER — PROCEDURE VISIT (OUTPATIENT)
Dept: WOUND CARE | Facility: HOSPITAL | Age: 61
End: 2021-10-06

## 2021-10-06 VITALS
SYSTOLIC BLOOD PRESSURE: 98 MMHG | HEART RATE: 66 BPM | TEMPERATURE: 96.1 F | RESPIRATION RATE: 16 BRPM | DIASTOLIC BLOOD PRESSURE: 60 MMHG

## 2021-10-06 DIAGNOSIS — M86.8X7 OTHER OSTEOMYELITIS OF LEFT FOOT (HCC): ICD-10-CM

## 2021-10-06 DIAGNOSIS — E11.621 DIABETIC ULCER OF LEFT HEEL ASSOCIATED WITH TYPE 2 DIABETES MELLITUS, WITH NECROSIS OF BONE (HCC): ICD-10-CM

## 2021-10-06 DIAGNOSIS — L97.424 DIABETIC ULCER OF LEFT HEEL ASSOCIATED WITH TYPE 2 DIABETES MELLITUS, WITH NECROSIS OF BONE (HCC): ICD-10-CM

## 2021-10-06 LAB
GLUCOSE BLDC GLUCOMTR-MCNC: 157 MG/DL (ref 70–99)
GLUCOSE BLDC GLUCOMTR-MCNC: 158 MG/DL (ref 70–99)

## 2021-10-06 PROCEDURE — 99183 HYPERBARIC OXYGEN THERAPY: CPT | Performed by: EMERGENCY MEDICINE

## 2021-10-06 PROCEDURE — 82962 GLUCOSE BLOOD TEST: CPT

## 2021-10-06 PROCEDURE — G0277 HBOT, FULL BODY CHAMBER, 30M: HCPCS

## 2021-10-06 NOTE — PROCEDURES
Vitals:    10/06/21 0845   BP: 118/64  Comment: pre treatment   BP Location: Left arm   Patient Position: Sitting   Pulse: 68   Resp: 16   Temp: 96.7 °F (35.9 °C)   TempSrc: Temporal   PainSc: 0-No pain        Procedure   WOUND CARE HYPERBARIC  Performed by: Shea Daniels MD  Authorized by: Narayan Dumont MD     Treatment number: 13   Mobility  The following were used for the patient's mobility: ambulatory  The patient appears alert and oriented  The lungs are clear.   TM's:     Right Ear:     -Intact      Left Ear:     -Intact  Pre-treatment Actions   The patient was voided/has a catheter.    Safety   A grounding device was attached.  We identified the patient using the following methods: Photo ID, verbally and date of birth.    Treatment Profile: 2.0   The total time of the procedure was 74 minutes.   Pressure    The patient's Leave Surface Rate was 9:04 PSI / minutes. The patient's Reach Bottom was 9:14 minutes. The patient's Leave Bottom Rate was 10:10 PSI / minutes. The patient's Return Surface was 10:20 minutes.   Post-Treatment Vital Signs and Assessment   Lungs:  Clear  Right TMs:  Intact  Left TMs:  Intact           Results for orders placed or performed in visit on 10/06/21   1. POC Glucose (Collected: 10/6/2021 10:27 AM)    Specimen: Blood   Result Value Ref Range    Glucose 157 (H) 70 - 99 mg/dL

## 2021-10-07 ENCOUNTER — PROCEDURE VISIT (OUTPATIENT)
Dept: WOUND CARE | Facility: HOSPITAL | Age: 61
End: 2021-10-07

## 2021-10-07 ENCOUNTER — HOSPITAL ENCOUNTER (OUTPATIENT)
Dept: INFUSION THERAPY | Facility: HOSPITAL | Age: 61
Setting detail: INFUSION SERIES
Discharge: HOME | End: 2021-10-07

## 2021-10-07 VITALS
SYSTOLIC BLOOD PRESSURE: 98 MMHG | DIASTOLIC BLOOD PRESSURE: 64 MMHG | HEART RATE: 82 BPM | TEMPERATURE: 96.8 F | RESPIRATION RATE: 16 BRPM

## 2021-10-07 VITALS
RESPIRATION RATE: 16 BRPM | SYSTOLIC BLOOD PRESSURE: 107 MMHG | TEMPERATURE: 98.2 F | HEART RATE: 68 BPM | DIASTOLIC BLOOD PRESSURE: 66 MMHG

## 2021-10-07 DIAGNOSIS — E11.621 DIABETIC ULCER OF LEFT HEEL ASSOCIATED WITH TYPE 2 DIABETES MELLITUS, WITH NECROSIS OF BONE (HCC): ICD-10-CM

## 2021-10-07 DIAGNOSIS — E11.621 DIABETIC ULCER OF RIGHT MIDFOOT ASSOCIATED WITH TYPE 2 DIABETES MELLITUS, WITH MUSCLE INVOLVEMENT WITHOUT EVIDENCE OF NECROSIS (HCC): ICD-10-CM

## 2021-10-07 DIAGNOSIS — M86.172 ACUTE OSTEOMYELITIS OF LEFT CALCANEUS (HCC): Primary | ICD-10-CM

## 2021-10-07 DIAGNOSIS — L97.424 DIABETIC ULCER OF LEFT HEEL ASSOCIATED WITH TYPE 2 DIABETES MELLITUS, WITH NECROSIS OF BONE (HCC): ICD-10-CM

## 2021-10-07 DIAGNOSIS — L97.415 DIABETIC ULCER OF RIGHT MIDFOOT ASSOCIATED WITH TYPE 2 DIABETES MELLITUS, WITH MUSCLE INVOLVEMENT WITHOUT EVIDENCE OF NECROSIS (HCC): ICD-10-CM

## 2021-10-07 DIAGNOSIS — M86.8X7 OTHER OSTEOMYELITIS OF LEFT FOOT (HCC): ICD-10-CM

## 2021-10-07 LAB
GLUCOSE BLDC GLUCOMTR-MCNC: 176 MG/DL (ref 70–99)
GLUCOSE BLDC GLUCOMTR-MCNC: 190 MG/DL (ref 70–99)

## 2021-10-07 PROCEDURE — 99183 HYPERBARIC OXYGEN THERAPY: CPT | Performed by: EMERGENCY MEDICINE

## 2021-10-07 PROCEDURE — G0277 HBOT, FULL BODY CHAMBER, 30M: HCPCS

## 2021-10-07 PROCEDURE — G0463 HOSPITAL OUTPT CLINIC VISIT: HCPCS

## 2021-10-07 PROCEDURE — 82962 GLUCOSE BLOOD TEST: CPT

## 2021-10-07 NOTE — PROCEDURES
Vitals:    10/07/21 1127   BP: 98/64  Comment: pre treatment   BP Location: Left arm   Patient Position: Sitting   Pulse: 82   Resp: 16   Temp: 96.8 °F (36 °C)   TempSrc: Temporal   PainSc: 0-No pain        Procedure   WOUND CARE HYPERBARIC  Performed by: Shea Daniels MD  Authorized by: Narayan Dumont MD     Treatment number: 14   Mobility  The following were used for the patient's mobility: ambulatory  The patient appears alert and oriented  The lungs are clear.   TM's:     Right Ear:     -Intact      Left Ear:     -Intact  Pre-treatment Actions   The patient was voided/has a catheter.    Safety   A grounding device was attached.  The safety checklist was reviewed.   We identified the patient.  We identified the patient using the following methods: Photo ID, verbally and date of birth.    Treatment Profile: 2.0   The total time of the procedure was 110 minutes.   Pressure    The patient's Leave Surface Rate was 9:00 PSI / minutes. The patient's Reach Bottom was 9:10 minutes. The patient's Leave Bottom Rate was 10:40 PSI / minutes. The patient's Return Surface was 10:50 minutes.   Post-Treatment Vital Signs and Assessment   Lungs:  Clear  Right TMs:  Intact  Left TMs:  Intact           Results for orders placed or performed in visit on 10/07/21   1. POC Glucose (Collected: 10/7/2021 11:06 AM)    Specimen: Blood   Result Value Ref Range    Glucose 176 (H) 70 - 99 mg/dL

## 2021-10-08 ENCOUNTER — HOSPITAL ENCOUNTER (OUTPATIENT)
Dept: INFUSION THERAPY | Facility: HOSPITAL | Age: 61
Setting detail: INFUSION SERIES
Discharge: HOME | End: 2021-10-08

## 2021-10-08 VITALS
SYSTOLIC BLOOD PRESSURE: 97 MMHG | HEART RATE: 80 BPM | DIASTOLIC BLOOD PRESSURE: 61 MMHG | OXYGEN SATURATION: 98 % | RESPIRATION RATE: 20 BRPM | TEMPERATURE: 98 F

## 2021-10-08 DIAGNOSIS — M86.172 ACUTE OSTEOMYELITIS OF LEFT CALCANEUS (HCC): Primary | ICD-10-CM

## 2021-10-08 DIAGNOSIS — E11.621 DIABETIC ULCER OF LEFT HEEL ASSOCIATED WITH TYPE 2 DIABETES MELLITUS, WITH NECROSIS OF BONE (HCC): ICD-10-CM

## 2021-10-08 DIAGNOSIS — E11.621 DIABETIC ULCER OF RIGHT MIDFOOT ASSOCIATED WITH TYPE 2 DIABETES MELLITUS, WITH MUSCLE INVOLVEMENT WITHOUT EVIDENCE OF NECROSIS (HCC): ICD-10-CM

## 2021-10-08 DIAGNOSIS — L97.415 DIABETIC ULCER OF RIGHT MIDFOOT ASSOCIATED WITH TYPE 2 DIABETES MELLITUS, WITH MUSCLE INVOLVEMENT WITHOUT EVIDENCE OF NECROSIS (HCC): ICD-10-CM

## 2021-10-08 DIAGNOSIS — L97.424 DIABETIC ULCER OF LEFT HEEL ASSOCIATED WITH TYPE 2 DIABETES MELLITUS, WITH NECROSIS OF BONE (HCC): ICD-10-CM

## 2021-10-08 PROCEDURE — 97602 WOUND(S) CARE NON-SELECTIVE: CPT

## 2021-10-08 PROCEDURE — G0463 HOSPITAL OUTPT CLINIC VISIT: HCPCS

## 2021-10-09 ENCOUNTER — HOSPITAL ENCOUNTER (OUTPATIENT)
Dept: INFUSION THERAPY | Facility: HOSPITAL | Age: 61
Setting detail: INFUSION SERIES
Discharge: HOME | End: 2021-10-09

## 2021-10-09 VITALS
RESPIRATION RATE: 16 BRPM | OXYGEN SATURATION: 99 % | SYSTOLIC BLOOD PRESSURE: 103 MMHG | TEMPERATURE: 97.7 F | HEART RATE: 72 BPM | DIASTOLIC BLOOD PRESSURE: 64 MMHG

## 2021-10-09 DIAGNOSIS — L97.422 DIABETIC ULCER OF LEFT HEEL ASSOCIATED WITH TYPE 2 DIABETES MELLITUS, WITH FAT LAYER EXPOSED (HCC): Primary | ICD-10-CM

## 2021-10-09 DIAGNOSIS — E11.621 DIABETIC ULCER OF LEFT HEEL ASSOCIATED WITH TYPE 2 DIABETES MELLITUS, WITH NECROSIS OF BONE (HCC): ICD-10-CM

## 2021-10-09 DIAGNOSIS — L97.415 DIABETIC ULCER OF RIGHT MIDFOOT ASSOCIATED WITH TYPE 2 DIABETES MELLITUS, WITH MUSCLE INVOLVEMENT WITHOUT EVIDENCE OF NECROSIS (HCC): ICD-10-CM

## 2021-10-09 DIAGNOSIS — E11.621 DIABETIC ULCER OF LEFT HEEL ASSOCIATED WITH TYPE 2 DIABETES MELLITUS, WITH FAT LAYER EXPOSED (HCC): Primary | ICD-10-CM

## 2021-10-09 DIAGNOSIS — L97.424 DIABETIC ULCER OF LEFT HEEL ASSOCIATED WITH TYPE 2 DIABETES MELLITUS, WITH NECROSIS OF BONE (HCC): ICD-10-CM

## 2021-10-09 DIAGNOSIS — M86.172 ACUTE OSTEOMYELITIS OF LEFT CALCANEUS (HCC): ICD-10-CM

## 2021-10-09 DIAGNOSIS — E11.621 DIABETIC ULCER OF RIGHT MIDFOOT ASSOCIATED WITH TYPE 2 DIABETES MELLITUS, WITH MUSCLE INVOLVEMENT WITHOUT EVIDENCE OF NECROSIS (HCC): ICD-10-CM

## 2021-10-09 PROCEDURE — G0463 HOSPITAL OUTPT CLINIC VISIT: HCPCS

## 2021-10-09 PROCEDURE — 97602 WOUND(S) CARE NON-SELECTIVE: CPT

## 2021-10-11 ENCOUNTER — HOSPITAL ENCOUNTER (OUTPATIENT)
Dept: INFUSION THERAPY | Facility: HOSPITAL | Age: 61
Setting detail: INFUSION SERIES
Discharge: HOME | End: 2021-10-11

## 2021-10-11 ENCOUNTER — PROCEDURE VISIT (OUTPATIENT)
Dept: WOUND CARE | Facility: HOSPITAL | Age: 61
End: 2021-10-11

## 2021-10-11 VITALS
RESPIRATION RATE: 20 BRPM | DIASTOLIC BLOOD PRESSURE: 70 MMHG | OXYGEN SATURATION: 99 % | SYSTOLIC BLOOD PRESSURE: 123 MMHG | HEART RATE: 70 BPM | TEMPERATURE: 97.9 F

## 2021-10-11 VITALS
HEART RATE: 72 BPM | RESPIRATION RATE: 16 BRPM | DIASTOLIC BLOOD PRESSURE: 74 MMHG | SYSTOLIC BLOOD PRESSURE: 122 MMHG | TEMPERATURE: 97 F

## 2021-10-11 DIAGNOSIS — L97.415 DIABETIC ULCER OF RIGHT MIDFOOT ASSOCIATED WITH TYPE 2 DIABETES MELLITUS, WITH MUSCLE INVOLVEMENT WITHOUT EVIDENCE OF NECROSIS (HCC): ICD-10-CM

## 2021-10-11 DIAGNOSIS — L97.424 DIABETIC ULCER OF LEFT HEEL ASSOCIATED WITH TYPE 2 DIABETES MELLITUS, WITH NECROSIS OF BONE (HCC): ICD-10-CM

## 2021-10-11 DIAGNOSIS — M86.8X7 OTHER OSTEOMYELITIS OF LEFT FOOT (HCC): ICD-10-CM

## 2021-10-11 DIAGNOSIS — E11.621 DIABETIC ULCER OF LEFT HEEL ASSOCIATED WITH TYPE 2 DIABETES MELLITUS, WITH NECROSIS OF BONE (HCC): ICD-10-CM

## 2021-10-11 DIAGNOSIS — M86.172 ACUTE OSTEOMYELITIS OF LEFT CALCANEUS (HCC): Primary | ICD-10-CM

## 2021-10-11 DIAGNOSIS — E11.621 DIABETIC ULCER OF RIGHT MIDFOOT ASSOCIATED WITH TYPE 2 DIABETES MELLITUS, WITH MUSCLE INVOLVEMENT WITHOUT EVIDENCE OF NECROSIS (HCC): ICD-10-CM

## 2021-10-11 LAB
GLUCOSE BLDC GLUCOMTR-MCNC: 139 MG/DL (ref 70–99)
GLUCOSE BLDC GLUCOMTR-MCNC: 169 MG/DL (ref 70–99)
GLUCOSE BLDC GLUCOMTR-MCNC: NORMAL MG/DL

## 2021-10-11 PROCEDURE — G0277 HBOT, FULL BODY CHAMBER, 30M: HCPCS

## 2021-10-11 PROCEDURE — G0463 HOSPITAL OUTPT CLINIC VISIT: HCPCS

## 2021-10-11 PROCEDURE — 99183 HYPERBARIC OXYGEN THERAPY: CPT | Performed by: EMERGENCY MEDICINE

## 2021-10-11 PROCEDURE — 97602 WOUND(S) CARE NON-SELECTIVE: CPT

## 2021-10-11 PROCEDURE — 82962 GLUCOSE BLOOD TEST: CPT | Performed by: EMERGENCY MEDICINE

## 2021-10-11 NOTE — PROCEDURES
Vitals:    10/11/21 0845   BP: 128/70  Comment: pre treatment   BP Location: Left arm   Patient Position: Sitting   Pulse: 70   Resp: 16   Temp: 96.2 °F (35.7 °C)   TempSrc: Temporal   PainSc: 0-No pain        Procedure   WOUND CARE HYPERBARIC  Performed by: Shea Daniels MD  Authorized by: Narayan Dumont MD     Treatment number: 15   Mobility  The following were used for the patient's mobility: ambulatory  The patient appears alert and oriented  The lungs are clear.   TM's:     Right Ear:     -Intact      Left Ear:     -Intact  Pre-treatment Actions   The patient was voided/has a catheter.    Safety   A grounding device was attached.  We identified the patient.  We identified the patient using the following methods: Photo ID, verbally and date of birth.    Treatment Profile: 2.0   The total time of the procedure was 110 minutes.   Pressure    The patient's Leave Surface Rate was 9:00 PSI / minutes. The patient's Reach Bottom was 9:10 minutes. The patient's Leave Bottom Rate was 10:40 PSI / minutes. The patient's Return Surface was 10:50 minutes.   Post-Treatment Vital Signs and Assessment   Lungs:  Clear  Right TMs:  Intact  Left TMs:  Intact           Results for orders placed or performed in visit on 10/11/21   1. POC Glucose (Collected: 10/11/2021 11:02 AM)    Specimen: Blood   Result Value Ref Range    Glucose 139 (H) 70 - 99 mg/dL

## 2021-10-12 ENCOUNTER — HOSPITAL ENCOUNTER (OUTPATIENT)
Dept: INFUSION THERAPY | Facility: HOSPITAL | Age: 61
Setting detail: INFUSION SERIES
Discharge: HOME | End: 2021-10-12

## 2021-10-12 VITALS
SYSTOLIC BLOOD PRESSURE: 114 MMHG | OXYGEN SATURATION: 99 % | DIASTOLIC BLOOD PRESSURE: 70 MMHG | TEMPERATURE: 98.2 F | HEART RATE: 85 BPM | HEIGHT: 74 IN | WEIGHT: 315 LBS | RESPIRATION RATE: 20 BRPM | BODY MASS INDEX: 40.43 KG/M2

## 2021-10-12 DIAGNOSIS — L97.415 DIABETIC ULCER OF RIGHT MIDFOOT ASSOCIATED WITH TYPE 2 DIABETES MELLITUS, WITH MUSCLE INVOLVEMENT WITHOUT EVIDENCE OF NECROSIS (HCC): ICD-10-CM

## 2021-10-12 DIAGNOSIS — E11.621 DIABETIC ULCER OF RIGHT MIDFOOT ASSOCIATED WITH TYPE 2 DIABETES MELLITUS, WITH MUSCLE INVOLVEMENT WITHOUT EVIDENCE OF NECROSIS (HCC): ICD-10-CM

## 2021-10-12 DIAGNOSIS — E11.621 DIABETIC ULCER OF LEFT HEEL ASSOCIATED WITH TYPE 2 DIABETES MELLITUS, WITH NECROSIS OF BONE (HCC): ICD-10-CM

## 2021-10-12 DIAGNOSIS — L97.424 DIABETIC ULCER OF LEFT HEEL ASSOCIATED WITH TYPE 2 DIABETES MELLITUS, WITH NECROSIS OF BONE (HCC): ICD-10-CM

## 2021-10-12 DIAGNOSIS — M86.172 ACUTE OSTEOMYELITIS OF LEFT CALCANEUS (HCC): Primary | ICD-10-CM

## 2021-10-12 PROCEDURE — G0463 HOSPITAL OUTPT CLINIC VISIT: HCPCS

## 2021-10-13 ENCOUNTER — OFFICE VISIT (OUTPATIENT)
Dept: WOUND CARE | Facility: HOSPITAL | Age: 61
End: 2021-10-13

## 2021-10-13 VITALS
DIASTOLIC BLOOD PRESSURE: 70 MMHG | OXYGEN SATURATION: 98 % | SYSTOLIC BLOOD PRESSURE: 118 MMHG | HEART RATE: 66 BPM | RESPIRATION RATE: 18 BRPM | TEMPERATURE: 96.9 F

## 2021-10-13 DIAGNOSIS — B35.1 ONYCHOMYCOSIS OF TOENAIL: ICD-10-CM

## 2021-10-13 DIAGNOSIS — E11.621 DIABETIC ULCER OF LEFT HEEL ASSOCIATED WITH TYPE 2 DIABETES MELLITUS, WITH NECROSIS OF BONE (HCC): Primary | ICD-10-CM

## 2021-10-13 DIAGNOSIS — L97.424 DIABETIC ULCER OF LEFT HEEL ASSOCIATED WITH TYPE 2 DIABETES MELLITUS, WITH NECROSIS OF BONE (HCC): Primary | ICD-10-CM

## 2021-10-13 DIAGNOSIS — E11.621 DIABETIC ULCER OF RIGHT MIDFOOT ASSOCIATED WITH TYPE 2 DIABETES MELLITUS, WITH MUSCLE INVOLVEMENT WITHOUT EVIDENCE OF NECROSIS (HCC): ICD-10-CM

## 2021-10-13 DIAGNOSIS — M86.8X7 OTHER OSTEOMYELITIS OF LEFT FOOT (HCC): ICD-10-CM

## 2021-10-13 DIAGNOSIS — E11.42 DIABETIC POLYNEUROPATHY ASSOCIATED WITH TYPE 2 DIABETES MELLITUS (HCC): ICD-10-CM

## 2021-10-13 DIAGNOSIS — L97.415 DIABETIC ULCER OF RIGHT MIDFOOT ASSOCIATED WITH TYPE 2 DIABETES MELLITUS, WITH MUSCLE INVOLVEMENT WITHOUT EVIDENCE OF NECROSIS (HCC): ICD-10-CM

## 2021-10-13 PROCEDURE — 11042 DBRDMT SUBQ TIS 1ST 20SQCM/<: CPT | Performed by: EMERGENCY MEDICINE

## 2021-10-13 PROCEDURE — 99212 OFFICE O/P EST SF 10 MIN: CPT | Performed by: EMERGENCY MEDICINE

## 2021-10-13 PROCEDURE — 11720 DEBRIDE NAIL 1-5: CPT | Performed by: EMERGENCY MEDICINE

## 2021-10-13 NOTE — PROGRESS NOTES
Chief Complaint  Wound Check (BILATERAL FOOT ULCERS, DM)    Subjective        Wound Check      Patient is a 60-year-old type II diabetic with neuropathy and insulin dependence.  He has been followed in the wound care clinic for nonhealing ulcers on his left heel and right distal midfoot. MRI showed osteomyelitis of the left heel. He had a PICC line placed 08/20/2021 and is undergoing IV antibiotics daily (ertapenem).  He recently started to undergo hyperbaric oxygen therapy for his osteomyelitis and is tolerating it well.  He is trying to control his sugars better and really watch what he eats.  He says he has a hard time offloading the wounds because he still has to do his ADLs and go to the laundromat etc.    He says he has been continuing to try to manage his dietary intake and eating more plant-based foods and cutting out fried foods and carbs as much as possible to improve his glucose control.  He has recently seen Kami Garcia for his diabetes and says he has already lost 12 pounds on his new medication.    Wounds have been getting packed with Aquacel Ag.  We had switched him to PolyMem silver at his last visit but did not properly communicate this to ONS so that will be fixed today.  They seem to be improving.  He has been doing hyperbaric oxygen therapy but unfortunately we are awaiting approval from his insurance company to continue.  He completed his full course of IV antibiotics.      He also has very thickened deformed and diseased toenails.  One of them is curving around and starting to press into the skin on the lateral aspect of the toe.  He does not want to go see the podiatrist and says usually they will just break off on their own.      Allergies:  Adhesive tape      Current Outpatient Medications:   •  alfuzosin (UROXATRAL) 10 MG 24 hr tablet, Take 10 mg by mouth Daily., Disp: , Rfl:   •  apixaban (Eliquis) 5 MG tablet tablet, Take 10 mg by mouth 2 (two) times a day., Disp: , Rfl:   •  buPROPion  XL (WELLBUTRIN XL) 300 MG 24 hr tablet, Take 300 mg by mouth 2 (Two) Times a Day., Disp: , Rfl:   •  citalopram (CeleXA) 10 MG tablet, TAKE (1) TABLET BY MOUTH 1 TIME PER DAY AT BEDTIME, Disp: , Rfl:   •  Dulaglutide (Trulicity) 0.75 MG/0.5ML solution pen-injector, Inject 0.75 mg under the skin into the appropriate area as directed Every 7 (Seven) Days for 4 doses., Disp: 2 mL, Rfl: 1  •  glucose blood test strip, Test blood sugar 3 times a day, Disp: 100 each, Rfl: 5  •  insulin aspart (NovoLOG) 100 UNIT/ML injection, Inject 20 Units under the skin into the appropriate area as directed 3 (Three) Times a Day Before Meals. Take up to 20 units as instructed, Disp: 20 mL, Rfl: 3  •  insulin detemir (LEVEMIR) 100 UNIT/ML injection, Inject 50 Units under the skin into the appropriate area as directed Every Night. Take 40 units and adjust to 50 as instructed, Disp: 20 mL, Rfl: 3  •  lisinopril (PRINIVIL,ZESTRIL) 20 MG tablet, Take 20 mg by mouth Daily., Disp: , Rfl:   •  metFORMIN (GLUCOPHAGE) 1000 MG tablet, Take 1,000 mg by mouth 2 (two) times a day., Disp: , Rfl:   •  metoprolol succinate XL (TOPROL-XL) 50 MG 24 hr tablet, Take 50 mg by mouth Daily., Disp: , Rfl:   •  sertraline (ZOLOFT) 100 MG tablet, Take 100 mg by mouth Daily., Disp: , Rfl:   •  simvastatin (Zocor) 20 MG tablet, Take 20 mg by mouth Every Night., Disp: , Rfl:     Past Medical History:   Diagnosis Date   • Absence of toe of right foot    • Acute osteomyelitis of left calcaneus  8/18/2021   • Anxiety and depression    • Arthritis    • Claustrophobia    • Corns and callus    • Diabetic ulcer of left heel associated with type 2 DM 8/18/2021   • Diabetic ulcer of left heel associated with type 2 DM 7/6/2021   • Diabetic ulcer of right midfoot associated with type 2 DM 8/18/2021   • Difficulty walking    • Essential hypertension 8/31/2021   • Hammertoe    • Hyperlipidemia LDL goal <100 8/31/2021   • Ingrown toenail    • Obesity    • Paroxysmal atrial  fibrillation 2021   • Polyneuropathy    • Pressure ulcer, stage 1    • Tinea unguium    • Type 2 diabetes mellitus with polyneuropathy      Past Surgical History:   Procedure Laterality Date   • CYST REMOVAL      center of back; benign   • INCISION AND DRAINAGE ABSCESS      back   • OTHER SURGICAL HISTORY      Surgical clips left foot   • TOE SURGERY Right     Removal of 5th toe   • WRIST SURGERY Left     repair of injury     Social History     Socioeconomic History   • Marital status: Single   Tobacco Use   • Smoking status: Former Smoker     Packs/day: 1.00     Types: Cigarettes     Quit date: 1991     Years since quittin.1   • Smokeless tobacco: Never Used   Vaping Use   • Vaping Use: Never used   Substance and Sexual Activity   • Alcohol use: Yes     Comment: Rare   • Drug use: Not Currently   • Sexual activity: Defer     Family History   Problem Relation Age of Onset   • Heart disease Mother    • Heart disease Father    • Cancer Father         Unspecified   • Heart disease Brother          Objective     Vitals:    10/13/21 1109   BP: 118/70   BP Location: Left arm   Patient Position: Sitting   Pulse: 66   Resp: 18   Temp: 96.9 °F (36.1 °C)   TempSrc: Temporal   SpO2: 98%   PainSc: 0-No pain     There is no height or weight on file to calculate BMI.    STEADI Fall Risk Assessment has not been completed.     Review of Systems     ROS:  No fevers, chills, sweats, or body aches. No shortness of breath.     I have reviewed the HPI and ROS as documented by MA/RN. Shea Daniels MD    Physical Exam     NAD, well dressed, well nourished  Normocephalic, atraumatic  EOMI, no scleral icterus  No respiratory distress, no cough  AAOx3, pleasant, cooperative  Onychomycosis of bilateral toenails.  Left third toenail is curving around laterally and starting to push into the skin of the lateral distal toe.  Right second toe also exhibits overgrowth.  No open wounds yet.  Wounds are improving and there is  decreased callus to BLE plantar foot ulcers.  The LLE heel wound to look smaller and shallower.  RLE plantar wound with decreased callus buildup and also appears to be improving and slowly getting smaller.  No undermining of RLE wound today.  No erythema, purulence, induration, or fluctuance.  LLE wound on heel remains tender with pressure but not with sharp stimuli.  See photos below for details.                    Result Review :  The following data was reviewed by: Shea Daniels MD on 10/13/2021:    Prior wound photos, prior office notes.  Nursing measurements.      Wound debridement  Performed by: Shea Daniels MD  Authorized by: Shea Daniels MD     Correct patient: Identification verified by two methods:     Verbally and Date of birth  Correct procedure/consent    Correct site/side    Correct equipment as applicable    How many wounds are you performing a debridement on?:  2  Wound 1:     Nursing Documentation:     Wound Location:  Left plantar heel  Measurements:     The total amount debrided on this wound was under 20 cm2.     Provider Documentation    Debridement type:  Sharp/excisional    Other:  Alcohol prep    Other:  Sterile 15 blade scalpel     None    Tissue debrided:  Moderate    Level removed:  Subcutaneous    Patient tolerance:  Good    Hemostasis achieved by:  Direct pressure    Wound Bed Post Debridement: See Photo    Wound 2:     Wound Location:  Right plantar foot   Measurements:    The total amount debrided on this wound was under 20 cm2.      Provider Documentation:     Debridement type:  Sharp/Excisional    Other:  Alcohol prep    Other:  Sterile 15 blade scalpel     None    Tissue debrided:  Small    Level removed:  Subcutaneous    Patient tolerance:  Good    Description:      Total combined area of both wounds less than 20 cm²      Left third and right second onychomycotic toenails debrided and sharp edges removed.  No further pressure on the skin.    Post debridement  photos:                                     Assessment and Plan   Diagnoses and all orders for this visit:    1. 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 Other osteomyelitis of left foot (HCC)    3. Diabetic ulcer of right midfoot associated with type 2 diabetes mellitus, with muscle involvement without evidence of necrosis (HCC)    4. Diabetic polyneuropathy associated with type 2 diabetes mellitus (HCC)    5. Onychomycosis of toenail    Other orders  -     Wound debridement         Continue full course of hyperbaric oxygen therapy.  Switch to PolyMem silver to bilateral wounds.  Therapy plan for ONS has been updated.    Recheck in 1 week.  Again advised to offload the wounds, particularly on the left heel, but the patient says this is hard to do.  Continue weight loss and improved diabetic control.    Patient was given instructions and counseling regarding their condition or for health maintenance advice, as well as the wound care plan and recommendations. They understand and agree with the plan.  They will follow back up here in the clinic but are instructed to contact us in the interim should they have any new, returning, or worsening symptoms or concerns. Please see specific information pulled into the AVS if appropriate.       Follow Up   Return in about 1 week (around 10/20/2021) for Recheck.      Shea Daniels MD

## 2021-10-15 ENCOUNTER — HOSPITAL ENCOUNTER (OUTPATIENT)
Dept: INFUSION THERAPY | Facility: HOSPITAL | Age: 61
Setting detail: INFUSION SERIES
Discharge: HOME | End: 2021-10-15

## 2021-10-15 ENCOUNTER — PROCEDURE VISIT (OUTPATIENT)
Dept: WOUND CARE | Facility: HOSPITAL | Age: 61
End: 2021-10-15

## 2021-10-15 VITALS
HEART RATE: 70 BPM | RESPIRATION RATE: 20 BRPM | OXYGEN SATURATION: 98 % | SYSTOLIC BLOOD PRESSURE: 103 MMHG | TEMPERATURE: 98 F | DIASTOLIC BLOOD PRESSURE: 62 MMHG

## 2021-10-15 VITALS
TEMPERATURE: 97 F | RESPIRATION RATE: 16 BRPM | HEART RATE: 66 BPM | SYSTOLIC BLOOD PRESSURE: 114 MMHG | DIASTOLIC BLOOD PRESSURE: 66 MMHG

## 2021-10-15 DIAGNOSIS — L97.415 DIABETIC ULCER OF RIGHT MIDFOOT ASSOCIATED WITH TYPE 2 DIABETES MELLITUS, WITH MUSCLE INVOLVEMENT WITHOUT EVIDENCE OF NECROSIS (HCC): ICD-10-CM

## 2021-10-15 DIAGNOSIS — L97.424 DIABETIC ULCER OF LEFT HEEL ASSOCIATED WITH TYPE 2 DIABETES MELLITUS, WITH NECROSIS OF BONE (HCC): ICD-10-CM

## 2021-10-15 DIAGNOSIS — E11.621 DIABETIC ULCER OF LEFT HEEL ASSOCIATED WITH TYPE 2 DIABETES MELLITUS, WITH NECROSIS OF BONE (HCC): ICD-10-CM

## 2021-10-15 DIAGNOSIS — M86.172 ACUTE OSTEOMYELITIS OF LEFT CALCANEUS (HCC): Primary | ICD-10-CM

## 2021-10-15 DIAGNOSIS — E11.621 DIABETIC ULCER OF RIGHT MIDFOOT ASSOCIATED WITH TYPE 2 DIABETES MELLITUS, WITH MUSCLE INVOLVEMENT WITHOUT EVIDENCE OF NECROSIS (HCC): ICD-10-CM

## 2021-10-15 DIAGNOSIS — M86.8X7 OTHER OSTEOMYELITIS OF LEFT FOOT (HCC): ICD-10-CM

## 2021-10-15 LAB
GLUCOSE BLDC GLUCOMTR-MCNC: 141 MG/DL (ref 70–99)
GLUCOSE BLDC GLUCOMTR-MCNC: 152 MG/DL (ref 70–99)

## 2021-10-15 PROCEDURE — 82962 GLUCOSE BLOOD TEST: CPT | Performed by: SURGERY

## 2021-10-15 PROCEDURE — G0463 HOSPITAL OUTPT CLINIC VISIT: HCPCS

## 2021-10-15 PROCEDURE — 99183 HYPERBARIC OXYGEN THERAPY: CPT | Performed by: SURGERY

## 2021-10-15 PROCEDURE — G0277 HBOT, FULL BODY CHAMBER, 30M: HCPCS

## 2021-10-15 NOTE — PROCEDURES
Vitals:    10/15/21 0840   BP: 94/60  Comment: pre treatment   BP Location: Left arm   Patient Position: Sitting   Pulse: 66   Resp: 16   Temp: 96.9 °F (36.1 °C)   TempSrc: Temporal   PainSc: 0-No pain        Procedure   WOUND CARE HYPERBARIC  Performed by: Narayan Dumont MD  Authorized by: Narayan Dumont MD     Treatment number: 16   Mobility  The following were used for the patient's mobility: ambulatory  The patient appears oriented  The lungs are clear.   Pre-treatment Actions   The patient was voided/has a catheter.    Safety   A grounding device was attached.  The safety checklist was reviewed.   We identified the patient.  We identified the patient using the following methods: Photo ID, verbally and date of birth.    Treatment Profile: 2.0   The total time of the procedure was 98 minutes.   Pressure    The patient's Leave Surface Rate was 8:57 PSI / minutes. The patient's Reach Bottom was 9:07 minutes. The patient's Leave Bottom Rate was 10:25 PSI / minutes. The patient's Return Surface was 10:35 minutes.   Post-Treatment Vital Signs and Assessment   Lungs:  Clear  Right TMs:  Intact  Left TMs:  Intact           Results for orders placed or performed in visit on 10/15/21   1. POC Glucose (Collected: 10/15/2021 10:51 AM)    Specimen: Blood   Result Value Ref Range    Glucose 152 (H) 70 - 99 mg/dL

## 2021-10-18 ENCOUNTER — HOSPITAL ENCOUNTER (OUTPATIENT)
Dept: INFUSION THERAPY | Facility: HOSPITAL | Age: 61
Setting detail: INFUSION SERIES
Discharge: HOME | End: 2021-10-18

## 2021-10-18 ENCOUNTER — PROCEDURE VISIT (OUTPATIENT)
Dept: WOUND CARE | Facility: HOSPITAL | Age: 61
End: 2021-10-18

## 2021-10-18 VITALS
SYSTOLIC BLOOD PRESSURE: 98 MMHG | DIASTOLIC BLOOD PRESSURE: 60 MMHG | OXYGEN SATURATION: 97 % | RESPIRATION RATE: 20 BRPM | TEMPERATURE: 98 F | HEART RATE: 71 BPM

## 2021-10-18 VITALS
RESPIRATION RATE: 16 BRPM | SYSTOLIC BLOOD PRESSURE: 108 MMHG | TEMPERATURE: 97 F | HEART RATE: 55 BPM | DIASTOLIC BLOOD PRESSURE: 66 MMHG

## 2021-10-18 DIAGNOSIS — M86.172 ACUTE OSTEOMYELITIS OF LEFT CALCANEUS (HCC): Primary | ICD-10-CM

## 2021-10-18 DIAGNOSIS — L97.415 DIABETIC ULCER OF RIGHT MIDFOOT ASSOCIATED WITH TYPE 2 DIABETES MELLITUS, WITH MUSCLE INVOLVEMENT WITHOUT EVIDENCE OF NECROSIS (HCC): ICD-10-CM

## 2021-10-18 DIAGNOSIS — L97.424 DIABETIC ULCER OF LEFT HEEL ASSOCIATED WITH TYPE 2 DIABETES MELLITUS, WITH NECROSIS OF BONE (HCC): ICD-10-CM

## 2021-10-18 DIAGNOSIS — E11.621 DIABETIC ULCER OF RIGHT MIDFOOT ASSOCIATED WITH TYPE 2 DIABETES MELLITUS, WITH MUSCLE INVOLVEMENT WITHOUT EVIDENCE OF NECROSIS (HCC): ICD-10-CM

## 2021-10-18 DIAGNOSIS — E11.621 DIABETIC ULCER OF LEFT HEEL ASSOCIATED WITH TYPE 2 DIABETES MELLITUS, WITH NECROSIS OF BONE (HCC): ICD-10-CM

## 2021-10-18 DIAGNOSIS — M86.8X7 OTHER OSTEOMYELITIS OF LEFT FOOT (HCC): ICD-10-CM

## 2021-10-18 LAB
GLUCOSE BLDC GLUCOMTR-MCNC: 144 MG/DL (ref 70–99)
GLUCOSE BLDC GLUCOMTR-MCNC: 175 MG/DL (ref 70–99)

## 2021-10-18 PROCEDURE — 99183 HYPERBARIC OXYGEN THERAPY: CPT | Performed by: EMERGENCY MEDICINE

## 2021-10-18 PROCEDURE — G0277 HBOT, FULL BODY CHAMBER, 30M: HCPCS

## 2021-10-18 PROCEDURE — G0463 HOSPITAL OUTPT CLINIC VISIT: HCPCS

## 2021-10-18 PROCEDURE — 82962 GLUCOSE BLOOD TEST: CPT | Performed by: EMERGENCY MEDICINE

## 2021-10-18 RX ORDER — DULAGLUTIDE 0.75 MG/.5ML
INJECTION, SOLUTION SUBCUTANEOUS
Qty: 2 ML | Refills: 1 | Status: SHIPPED | OUTPATIENT
Start: 2021-10-18 | End: 2021-11-06

## 2021-10-18 NOTE — PROCEDURES
Vitals:    10/18/21 0843   BP: 94/60  Comment: pre treatment   BP Location: Left arm   Patient Position: Sitting   Pulse: 72   Resp: 16   Temp: 97.4 °F (36.3 °C)   TempSrc: Temporal   PainSc: 0-No pain        Procedure   WOUND CARE HYPERBARIC  Performed by: Shea Daniels MD  Authorized by: Narayan Dumont MD     Treatment number: 17   Mobility  The following were used for the patient's mobility: ambulatory  The patient appears alert and oriented  TM's:     Right Ear:     -Intact      Left Ear:     -Intact  Pre-treatment Actions   The patient was voided/has a catheter.    Safety   A grounding device was attached.  The safety checklist was reviewed.   We identified the patient.  We identified the patient using the following methods: Photo ID, verbally and date of birth.    Treatment Profile: 2.0   The total time of the procedure was 110 minutes.   Pressure    The patient's Leave Surface Rate was 8:58 PSI / minutes. The patient's Reach Bottom was 9:08 minutes. The patient's Leave Bottom Rate was 10:38 PSI / minutes. The patient's Return Surface was 10:48 minutes.   Post-Treatment Vital Signs and Assessment   Lungs:  Clear  Right TMs:  Intact  Left TMs:  Intact           Results for orders placed or performed in visit on 10/18/21   1. POC Glucose (Collected: 10/18/2021 11:05 AM)    Specimen: Blood   Result Value Ref Range    Glucose 144 (H) 70 - 99 mg/dL

## 2021-10-19 ENCOUNTER — PROCEDURE VISIT (OUTPATIENT)
Dept: WOUND CARE | Facility: HOSPITAL | Age: 61
End: 2021-10-19

## 2021-10-19 ENCOUNTER — HOSPITAL ENCOUNTER (OUTPATIENT)
Dept: INFUSION THERAPY | Facility: HOSPITAL | Age: 61
Setting detail: INFUSION SERIES
Discharge: HOME | End: 2021-10-19

## 2021-10-19 VITALS
RESPIRATION RATE: 16 BRPM | TEMPERATURE: 96.5 F | DIASTOLIC BLOOD PRESSURE: 64 MMHG | HEART RATE: 69 BPM | SYSTOLIC BLOOD PRESSURE: 108 MMHG

## 2021-10-19 VITALS
DIASTOLIC BLOOD PRESSURE: 64 MMHG | OXYGEN SATURATION: 98 % | SYSTOLIC BLOOD PRESSURE: 106 MMHG | HEART RATE: 68 BPM | RESPIRATION RATE: 20 BRPM | TEMPERATURE: 97.6 F

## 2021-10-19 DIAGNOSIS — M86.8X7 OTHER OSTEOMYELITIS OF LEFT FOOT (HCC): ICD-10-CM

## 2021-10-19 DIAGNOSIS — E11.621 DIABETIC ULCER OF LEFT HEEL ASSOCIATED WITH TYPE 2 DIABETES MELLITUS, WITH NECROSIS OF BONE (HCC): ICD-10-CM

## 2021-10-19 DIAGNOSIS — E11.621 DIABETIC ULCER OF RIGHT MIDFOOT ASSOCIATED WITH TYPE 2 DIABETES MELLITUS, WITH MUSCLE INVOLVEMENT WITHOUT EVIDENCE OF NECROSIS (HCC): ICD-10-CM

## 2021-10-19 DIAGNOSIS — L97.424 DIABETIC ULCER OF LEFT HEEL ASSOCIATED WITH TYPE 2 DIABETES MELLITUS, WITH NECROSIS OF BONE (HCC): ICD-10-CM

## 2021-10-19 DIAGNOSIS — L97.415 DIABETIC ULCER OF RIGHT MIDFOOT ASSOCIATED WITH TYPE 2 DIABETES MELLITUS, WITH MUSCLE INVOLVEMENT WITHOUT EVIDENCE OF NECROSIS (HCC): ICD-10-CM

## 2021-10-19 DIAGNOSIS — M86.172 ACUTE OSTEOMYELITIS OF LEFT CALCANEUS (HCC): Primary | ICD-10-CM

## 2021-10-19 DIAGNOSIS — E11.621 DIABETIC ULCER OF LEFT HEEL ASSOCIATED WITH TYPE 2 DIABETES MELLITUS, WITH NECROSIS OF BONE (HCC): Primary | ICD-10-CM

## 2021-10-19 DIAGNOSIS — L97.424 DIABETIC ULCER OF LEFT HEEL ASSOCIATED WITH TYPE 2 DIABETES MELLITUS, WITH NECROSIS OF BONE (HCC): Primary | ICD-10-CM

## 2021-10-19 LAB
GLUCOSE BLDC GLUCOMTR-MCNC: 157 MG/DL (ref 70–99)
GLUCOSE BLDC GLUCOMTR-MCNC: 163 MG/DL (ref 70–99)

## 2021-10-19 PROCEDURE — G0463 HOSPITAL OUTPT CLINIC VISIT: HCPCS

## 2021-10-19 PROCEDURE — G0277 HBOT, FULL BODY CHAMBER, 30M: HCPCS

## 2021-10-19 PROCEDURE — 99183 HYPERBARIC OXYGEN THERAPY: CPT | Performed by: EMERGENCY MEDICINE

## 2021-10-19 PROCEDURE — 82962 GLUCOSE BLOOD TEST: CPT | Performed by: EMERGENCY MEDICINE

## 2021-10-19 NOTE — PROCEDURES
Vitals:    10/19/21 0840   BP: 118/68  Comment: pre treatment   BP Location: Left arm   Patient Position: Sitting   Pulse: 69   Resp: 16   Temp: 98 °F (36.7 °C)   TempSrc: Temporal   PainSc: 0-No pain        Procedure   WOUND CARE HYPERBARIC  Performed by: Shea Daniels MD  Authorized by: Narayan Dumont MD     Treatment number: 18   Mobility  The following were used for the patient's mobility: ambulatory  The patient appears oriented  The lungs are clear.   TM's:     Right Ear:     -Intact      Left Ear:     -Intact  Pre-treatment Actions   The patient was voided/has a catheter.    Safety   A grounding device was attached.  We identified the patient.  We identified the patient using the following methods: Photo ID, verbally and date of birth.    Treatment Profile: 2.0   The total time of the procedure was 110 minutes.   Pressure    The patient's Leave Surface Rate was 8:56 PSI / minutes. The patient's Reach Bottom was 9:06 minutes. The patient's Leave Bottom Rate was 10:36 PSI / minutes. The patient's Return Surface was 10:46 minutes.   Post-Treatment Vital Signs and Assessment   Lungs:  Clear  Right TMs:  Intact  Left TMs:  Intact           Results for orders placed or performed in visit on 10/19/21   1. POC Glucose (Collected: 10/19/2021 11:03 AM)    Specimen: Blood   Result Value Ref Range    Glucose 163 (H) 70 - 99 mg/dL

## 2021-10-20 ENCOUNTER — OFFICE VISIT (OUTPATIENT)
Dept: WOUND CARE | Facility: HOSPITAL | Age: 61
End: 2021-10-20

## 2021-10-20 ENCOUNTER — PROCEDURE VISIT (OUTPATIENT)
Dept: WOUND CARE | Facility: HOSPITAL | Age: 61
End: 2021-10-20

## 2021-10-20 VITALS
DIASTOLIC BLOOD PRESSURE: 62 MMHG | TEMPERATURE: 97.3 F | HEART RATE: 74 BPM | HEIGHT: 74 IN | SYSTOLIC BLOOD PRESSURE: 102 MMHG | BODY MASS INDEX: 40.43 KG/M2 | WEIGHT: 315 LBS | RESPIRATION RATE: 16 BRPM

## 2021-10-20 VITALS
TEMPERATURE: 97.2 F | RESPIRATION RATE: 18 BRPM | SYSTOLIC BLOOD PRESSURE: 102 MMHG | HEART RATE: 67 BPM | DIASTOLIC BLOOD PRESSURE: 56 MMHG

## 2021-10-20 DIAGNOSIS — M86.8X7 OTHER OSTEOMYELITIS OF LEFT FOOT (HCC): ICD-10-CM

## 2021-10-20 DIAGNOSIS — L97.424 DIABETIC ULCER OF LEFT HEEL ASSOCIATED WITH TYPE 2 DIABETES MELLITUS, WITH NECROSIS OF BONE (HCC): ICD-10-CM

## 2021-10-20 DIAGNOSIS — E11.621 DIABETIC ULCER OF LEFT HEEL ASSOCIATED WITH TYPE 2 DIABETES MELLITUS, WITH NECROSIS OF BONE (HCC): ICD-10-CM

## 2021-10-20 DIAGNOSIS — L97.424 DIABETIC ULCER OF LEFT HEEL ASSOCIATED WITH TYPE 2 DIABETES MELLITUS, WITH NECROSIS OF BONE (HCC): Primary | ICD-10-CM

## 2021-10-20 DIAGNOSIS — E11.621 DIABETIC ULCER OF LEFT HEEL ASSOCIATED WITH TYPE 2 DIABETES MELLITUS, WITH NECROSIS OF BONE (HCC): Primary | ICD-10-CM

## 2021-10-20 DIAGNOSIS — L97.415 DIABETIC ULCER OF RIGHT MIDFOOT ASSOCIATED WITH TYPE 2 DIABETES MELLITUS, WITH MUSCLE INVOLVEMENT WITHOUT EVIDENCE OF NECROSIS (HCC): ICD-10-CM

## 2021-10-20 DIAGNOSIS — E11.621 DIABETIC ULCER OF RIGHT MIDFOOT ASSOCIATED WITH TYPE 2 DIABETES MELLITUS, WITH MUSCLE INVOLVEMENT WITHOUT EVIDENCE OF NECROSIS (HCC): ICD-10-CM

## 2021-10-20 LAB
GLUCOSE BLDC GLUCOMTR-MCNC: 205 MG/DL (ref 70–99)
GLUCOSE BLDC GLUCOMTR-MCNC: 222 MG/DL (ref 70–99)

## 2021-10-20 PROCEDURE — 11042 DBRDMT SUBQ TIS 1ST 20SQCM/<: CPT | Performed by: EMERGENCY MEDICINE

## 2021-10-20 PROCEDURE — 82962 GLUCOSE BLOOD TEST: CPT | Performed by: EMERGENCY MEDICINE

## 2021-10-20 PROCEDURE — 11043 DBRDMT MUSC&/FSCA 1ST 20/<: CPT | Performed by: EMERGENCY MEDICINE

## 2021-10-20 PROCEDURE — G0277 HBOT, FULL BODY CHAMBER, 30M: HCPCS

## 2021-10-20 PROCEDURE — 99183 HYPERBARIC OXYGEN THERAPY: CPT | Performed by: EMERGENCY MEDICINE

## 2021-10-20 NOTE — PROCEDURES
Vitals:    10/20/21 1135   BP: 92/54  Comment: pre treatment   BP Location: Left arm   Patient Position: Sitting   Pulse: 74   Resp: 16   Temp: 97.3 °F (36.3 °C)   TempSrc: Temporal   PainSc: 0-No pain        Procedure   WOUND CARE HYPERBARIC  Performed by: Shea Daniels MD  Authorized by: Narayan Dumont MD     Treatment number: 19   Mobility  The following were used for the patient's mobility: ambulatory  The patient appears alert and oriented  TM's:     Right Ear:     -Intact      Left Ear:     -Intact  Pre-treatment Actions   The patient was voided/has a catheter.    Safety   A grounding device was attached.  We identified the patient.  We identified the patient using the following methods: Photo ID, verbally and date of birth.    Treatment Profile: 2.0   The total time of the procedure was 110 minutes.   Pressure    The patient's Leave Surface Rate was 12:04 PSI / minutes. The patient's Reach Bottom was 12:14 minutes. The patient's Leave Bottom Rate was 1:44 PSI / minutes. The patient's Return Surface was 1:54 minutes.   Post-Treatment Vital Signs and Assessment   Lungs:  Clear  Right TMs:  Intact  Left TMs:  Intact           Results for orders placed or performed in visit on 10/20/21   1. POC Glucose (Collected: 10/20/2021  2:01 PM)    Specimen: Blood   Result Value Ref Range    Glucose 205 (H) 70 - 99 mg/dL

## 2021-10-20 NOTE — PROGRESS NOTES
Chief Complaint  Wound Check (DM BILATERAL FOOT ULCERS (BS: 222))    Subjective        Wound Check      Patient is a 60-year-old type II diabetic with neuropathy and insulin dependence.  He has been followed in the wound care clinic for nonhealing ulcers on his left heel and right distal midfoot. MRI showed osteomyelitis of the left heel. He had a PICC line placed 08/20/2021 and is undergoing IV antibiotics daily (ertapenem).  He recently started to undergo hyperbaric oxygen therapy for his osteomyelitis and is tolerating it well.  He is trying to control his sugars better and really watch what he eats.  He says he has a hard time offloading the wounds because he still has to do his ADLs and go to the laundromat etc.    He says he has been continuing to try to manage his dietary intake and eating more plant-based foods and cutting out fried foods and carbs as much as possible to improve his glucose control.  He has recently seen Kami Garcia for his diabetes and says he has already lost 12 pounds on his new medication.    He completed his full course of IV antibiotics and is continuing with HBO.      He has been doing PolyMem silver to both plantar wounds and they seem to be improving.      Allergies:  Adhesive tape      Current Outpatient Medications:   •  alfuzosin (UROXATRAL) 10 MG 24 hr tablet, Take 10 mg by mouth Daily., Disp: , Rfl:   •  apixaban (Eliquis) 5 MG tablet tablet, Take 10 mg by mouth 2 (two) times a day., Disp: , Rfl:   •  buPROPion XL (WELLBUTRIN XL) 300 MG 24 hr tablet, Take 300 mg by mouth 2 (Two) Times a Day., Disp: , Rfl:   •  citalopram (CeleXA) 10 MG tablet, TAKE (1) TABLET BY MOUTH 1 TIME PER DAY AT BEDTIME, Disp: , Rfl:   •  glucose blood test strip, Test blood sugar 3 times a day, Disp: 100 each, Rfl: 5  •  insulin aspart (NovoLOG) 100 UNIT/ML injection, Inject 20 Units under the skin into the appropriate area as directed 3 (Three) Times a Day Before Meals. Take up to 20 units as  instructed, Disp: 20 mL, Rfl: 3  •  insulin detemir (LEVEMIR) 100 UNIT/ML injection, Inject 50 Units under the skin into the appropriate area as directed Every Night. Take 40 units and adjust to 50 as instructed, Disp: 20 mL, Rfl: 3  •  lisinopril (PRINIVIL,ZESTRIL) 20 MG tablet, Take 20 mg by mouth Daily., Disp: , Rfl:   •  metFORMIN (GLUCOPHAGE) 1000 MG tablet, Take 1,000 mg by mouth 2 (two) times a day., Disp: , Rfl:   •  metoprolol succinate XL (TOPROL-XL) 50 MG 24 hr tablet, Take 50 mg by mouth Daily., Disp: , Rfl:   •  sertraline (ZOLOFT) 100 MG tablet, Take 100 mg by mouth Daily., Disp: , Rfl:   •  simvastatin (Zocor) 20 MG tablet, Take 20 mg by mouth Every Night., Disp: , Rfl:   •  Trulicity 0.75 MG/0.5ML solution pen-injector, INJECT ON SYRINGE INTO THE SKIN WEEKLY, Disp: 2 mL, Rfl: 1    Past Medical History:   Diagnosis Date   • Absence of toe of right foot    • Acute osteomyelitis of left calcaneus  8/18/2021   • Anxiety and depression    • Arthritis    • Claustrophobia    • Corns and callus    • Diabetic ulcer of left heel associated with type 2 DM 8/18/2021   • Diabetic ulcer of left heel associated with type 2 DM 7/6/2021   • Diabetic ulcer of right midfoot associated with type 2 DM 8/18/2021   • Difficulty walking    • Essential hypertension 8/31/2021   • Hammertoe    • Hyperlipidemia LDL goal <100 8/31/2021   • Ingrown toenail    • Obesity    • Paroxysmal atrial fibrillation 8/31/2021   • Polyneuropathy    • Pressure ulcer, stage 1    • Tinea unguium    • Type 2 diabetes mellitus with polyneuropathy      Past Surgical History:   Procedure Laterality Date   • CYST REMOVAL      center of back; benign   • INCISION AND DRAINAGE ABSCESS      back   • OTHER SURGICAL HISTORY      Surgical clips left foot   • TOE SURGERY Right     Removal of 5th toe   • WRIST SURGERY Left     repair of injury     Social History     Socioeconomic History   • Marital status: Single   Tobacco Use   • Smoking status: Former  "Smoker     Packs/day: 1.00     Types: Cigarettes     Quit date: 1991     Years since quittin.1   • Smokeless tobacco: Never Used   Vaping Use   • Vaping Use: Never used   Substance and Sexual Activity   • Alcohol use: Yes     Comment: Rare   • Drug use: Not Currently   • Sexual activity: Defer     Family History   Problem Relation Age of Onset   • Heart disease Mother    • Heart disease Father    • Cancer Father         Unspecified   • Heart disease Brother          Objective     Vitals:    10/20/21 1120 10/20/21 1129   BP: (!) 88/54 102/62   BP Location: Left arm Left arm   Patient Position: Sitting Sitting   Pulse: 74    Resp: 16    Temp: 97.3 °F (36.3 °C)    TempSrc: Temporal    Weight: (!) 160 kg (353 lb)    Height: 188 cm (74\")    PainSc: 0-No pain      Body mass index is 45.32 kg/m².    STEADI Fall Risk Assessment has not been completed.     Review of Systems     ROS:  No fevers, chills, sweats, or body aches. No shortness of breath.     I have reviewed the HPI and ROS as documented by MA/RN. Shea Daniels MD    Physical Exam     NAD, well dressed, well nourished  Normocephalic, atraumatic  EOMI, no scleral icterus  No respiratory distress, no cough  AAOx3, pleasant, cooperative  Wounds are improving and there is decreased callus to BLE plantar foot ulcers.  The LLE heel wound to look smaller and shallower.  RLE plantar wound with decreased callus buildup and also appears to be improving and slowly getting smaller.  No undermining of RLE wound today.  No erythema, purulence, induration, or fluctuance.  LLE wound on heel remains tender with pressure but not with sharp stimuli.  See photos below for details.                          Result Review :  The following data was reviewed by: Shea Daniels MD on 10/13/2021:    Prior wound photos, prior office notes.  Nursing measurements.      Wound debridement  Performed by: Shea Daniels MD  Authorized by: Shea Daniels MD     Correct patient: " Identification verified by two methods:     Verbally and Date of birth  Correct procedure/consent    Correct site/side    Correct equipment as applicable    How many wounds are you performing a debridement on?:  2  Wound 1:     Nursing Documentation:     Wound Location:  Left plantar foot  Measurements:     The total amount debrided on this wound was under 20 cm2.     Provider Documentation    Debridement type:  Sharp/excisional    Betadine      Other:  Sterile 10 blade scalpel     None    Tissue debrided:  Moderate    Level removed:  Muscle    Patient tolerance:  Good    Hemostasis achieved by:  Direct pressure    Wound Bed Post Debridement: See Photo    Wound 2:     Wound Location:  Right plantar foot   Measurements:    The total amount debrided on this wound was under 20 cm2.      Provider Documentation:     Debridement type:  Sharp/Excisional    Betadine      Other:  Sterile 10 blade scalpel     None    Tissue debrided:  Small    Level removed:  Subcutaneous    Patient tolerance:  Good    Hemostasis achieved by:  Direct Pressure    Wound Bed Post Debridement: See Photo        Post debridement photos:                 Assessment and Plan   Diagnoses and all orders for this visit:    1. Diabetic ulcer of left heel associated with type 2 diabetes mellitus, with necrosis of bone (HCC) (Primary)  -     Wound debridement    2. Other osteomyelitis of left foot (HCC)    3. Diabetic ulcer of right midfoot associated with type 2 diabetes mellitus, with muscle involvement without evidence of necrosis (HCC)  -     Wound debridement         Continue full course of hyperbaric oxygen therapy.  PolyMem silver to bilateral wounds every other day.  May consider wound VAC to left plantar wound now that there is no longer any exposed bone.  It seems to be getting smaller but progress is slow and it is still quite deep.     Recheck in 1 week.  Again advised to offload the wounds, particularly on the left heel, but the patient says  this is hard to do.  Continue weight loss and improved diabetic control.    Patient was given instructions and counseling regarding their condition or for health maintenance advice, as well as the wound care plan and recommendations. They understand and agree with the plan.  They will follow back up here in the clinic but are instructed to contact us in the interim should they have any new, returning, or worsening symptoms or concerns. Please see specific information pulled into the AVS if appropriate.       Follow Up   Return in about 1 week (around 10/27/2021) for Recheck.      Shea Daniels MD

## 2021-10-21 ENCOUNTER — OFFICE VISIT (OUTPATIENT)
Dept: DIABETES SERVICES | Facility: HOSPITAL | Age: 61
End: 2021-10-21

## 2021-10-21 VITALS
HEART RATE: 75 BPM | BODY MASS INDEX: 40.43 KG/M2 | OXYGEN SATURATION: 94 % | HEIGHT: 74 IN | SYSTOLIC BLOOD PRESSURE: 125 MMHG | DIASTOLIC BLOOD PRESSURE: 75 MMHG | TEMPERATURE: 98.7 F | WEIGHT: 315 LBS

## 2021-10-21 DIAGNOSIS — E11.621 DIABETIC ULCER OF LEFT HEEL ASSOCIATED WITH TYPE 2 DIABETES MELLITUS, WITH NECROSIS OF BONE (HCC): ICD-10-CM

## 2021-10-21 DIAGNOSIS — E11.40 TYPE 2 DIABETES MELLITUS WITH DIABETIC NEUROPATHY, WITH LONG-TERM CURRENT USE OF INSULIN (HCC): ICD-10-CM

## 2021-10-21 DIAGNOSIS — Z79.4 TYPE 2 DIABETES MELLITUS WITH DIABETIC NEUROPATHY, WITH LONG-TERM CURRENT USE OF INSULIN (HCC): ICD-10-CM

## 2021-10-21 DIAGNOSIS — E11.65 UNCONTROLLED TYPE 2 DIABETES MELLITUS WITH HYPERGLYCEMIA (HCC): Primary | ICD-10-CM

## 2021-10-21 DIAGNOSIS — L97.424 DIABETIC ULCER OF LEFT HEEL ASSOCIATED WITH TYPE 2 DIABETES MELLITUS, WITH NECROSIS OF BONE (HCC): ICD-10-CM

## 2021-10-21 LAB — GLUCOSE BLDC GLUCOMTR-MCNC: 194 MG/DL (ref 70–99)

## 2021-10-21 PROCEDURE — 99214 OFFICE O/P EST MOD 30 MIN: CPT | Performed by: NURSE PRACTITIONER

## 2021-10-21 PROCEDURE — G0463 HOSPITAL OUTPT CLINIC VISIT: HCPCS | Performed by: NURSE PRACTITIONER

## 2021-10-21 PROCEDURE — 82962 GLUCOSE BLOOD TEST: CPT | Performed by: NURSE PRACTITIONER

## 2021-10-22 ENCOUNTER — APPOINTMENT (OUTPATIENT)
Dept: INFUSION THERAPY | Facility: HOSPITAL | Age: 61
End: 2021-10-22

## 2021-10-23 ENCOUNTER — APPOINTMENT (OUTPATIENT)
Dept: INFUSION THERAPY | Facility: HOSPITAL | Age: 61
End: 2021-10-23

## 2021-10-25 ENCOUNTER — APPOINTMENT (OUTPATIENT)
Dept: INFUSION THERAPY | Facility: HOSPITAL | Age: 61
End: 2021-10-25

## 2021-10-26 ENCOUNTER — HOSPITAL ENCOUNTER (OUTPATIENT)
Dept: INFUSION THERAPY | Facility: HOSPITAL | Age: 61
Discharge: HOME | End: 2021-10-26

## 2021-10-26 ENCOUNTER — PROCEDURE VISIT (OUTPATIENT)
Dept: WOUND CARE | Facility: HOSPITAL | Age: 61
End: 2021-10-26

## 2021-10-26 VITALS
SYSTOLIC BLOOD PRESSURE: 102 MMHG | DIASTOLIC BLOOD PRESSURE: 64 MMHG | RESPIRATION RATE: 16 BRPM | TEMPERATURE: 97.2 F | HEART RATE: 64 BPM

## 2021-10-26 VITALS
TEMPERATURE: 97.6 F | SYSTOLIC BLOOD PRESSURE: 108 MMHG | OXYGEN SATURATION: 100 % | HEART RATE: 69 BPM | RESPIRATION RATE: 20 BRPM | DIASTOLIC BLOOD PRESSURE: 59 MMHG

## 2021-10-26 DIAGNOSIS — E11.621 DIABETIC ULCER OF RIGHT MIDFOOT ASSOCIATED WITH TYPE 2 DIABETES MELLITUS, WITH MUSCLE INVOLVEMENT WITHOUT EVIDENCE OF NECROSIS (HCC): ICD-10-CM

## 2021-10-26 DIAGNOSIS — L97.415 DIABETIC ULCER OF RIGHT MIDFOOT ASSOCIATED WITH TYPE 2 DIABETES MELLITUS, WITH MUSCLE INVOLVEMENT WITHOUT EVIDENCE OF NECROSIS (HCC): ICD-10-CM

## 2021-10-26 DIAGNOSIS — M86.172 ACUTE OSTEOMYELITIS OF LEFT CALCANEUS (HCC): Primary | ICD-10-CM

## 2021-10-26 DIAGNOSIS — M86.8X7 OTHER OSTEOMYELITIS OF LEFT FOOT (HCC): ICD-10-CM

## 2021-10-26 DIAGNOSIS — E11.621 DIABETIC ULCER OF LEFT HEEL ASSOCIATED WITH TYPE 2 DIABETES MELLITUS, WITH NECROSIS OF BONE (HCC): ICD-10-CM

## 2021-10-26 DIAGNOSIS — L97.424 DIABETIC ULCER OF LEFT HEEL ASSOCIATED WITH TYPE 2 DIABETES MELLITUS, WITH NECROSIS OF BONE (HCC): ICD-10-CM

## 2021-10-26 LAB
GLUCOSE BLDC GLUCOMTR-MCNC: 128 MG/DL (ref 70–99)
GLUCOSE BLDC GLUCOMTR-MCNC: 130 MG/DL (ref 70–99)

## 2021-10-26 PROCEDURE — 82962 GLUCOSE BLOOD TEST: CPT | Performed by: EMERGENCY MEDICINE

## 2021-10-26 PROCEDURE — G0277 HBOT, FULL BODY CHAMBER, 30M: HCPCS

## 2021-10-26 PROCEDURE — G0463 HOSPITAL OUTPT CLINIC VISIT: HCPCS

## 2021-10-26 PROCEDURE — 99183 HYPERBARIC OXYGEN THERAPY: CPT | Performed by: EMERGENCY MEDICINE

## 2021-10-26 NOTE — PROCEDURES
Vitals:    10/26/21 0845   BP: 102/66  Comment: pre treatment   BP Location: Left arm   Patient Position: Sitting   Pulse: 61   Resp: 16   Temp: 97 °F (36.1 °C)   TempSrc: Temporal   PainSc: 0-No pain        Procedure   WOUND CARE HYPERBARIC  Performed by: Shea Daniels MD  Authorized by: Narayan Dumont MD     Treatment number: 20   Mobility  The following were used for the patient's mobility: ambulatory  The patient appears alert and oriented  The lungs are clear.   TM's:     Right Ear:     -Intact      Left Ear:     -Intact  Pre-treatment Actions   The patient was voided/has a catheter.    Safety   A grounding device was attached.  The safety checklist was reviewed.   We identified the patient.  We identified the patient using the following methods: Photo ID, verbally and date of birth.    Treatment Profile: 2.0   The total time of the procedure was 110 minutes.   Pressure    The patient's Leave Surface Rate was 9:01 PSI / minutes. The patient's Reach Bottom was 9:11 minutes. The patient's Leave Bottom Rate was 10;41 PSI / minutes. The patient's Return Surface was 10:51 minutes.   Post-Treatment Vital Signs and Assessment   Lungs:  CLEAR  Right TMs:  INTACT  Left TMs:  INTACT           Results for orders placed or performed in visit on 10/26/21   1. POC Glucose (Collected: 10/26/2021 10:57 AM)    Specimen: Blood   Result Value Ref Range    Glucose 128 (H) 70 - 99 mg/dL

## 2021-10-27 ENCOUNTER — APPOINTMENT (OUTPATIENT)
Dept: INFUSION THERAPY | Facility: HOSPITAL | Age: 61
End: 2021-10-27

## 2021-10-27 ENCOUNTER — OFFICE VISIT (OUTPATIENT)
Dept: WOUND CARE | Facility: HOSPITAL | Age: 61
End: 2021-10-27

## 2021-10-27 ENCOUNTER — PROCEDURE VISIT (OUTPATIENT)
Dept: WOUND CARE | Facility: HOSPITAL | Age: 61
End: 2021-10-27

## 2021-10-27 VITALS
RESPIRATION RATE: 16 BRPM | DIASTOLIC BLOOD PRESSURE: 62 MMHG | SYSTOLIC BLOOD PRESSURE: 102 MMHG | TEMPERATURE: 96.5 F | HEART RATE: 85 BPM

## 2021-10-27 VITALS
HEART RATE: 85 BPM | HEIGHT: 74 IN | SYSTOLIC BLOOD PRESSURE: 102 MMHG | DIASTOLIC BLOOD PRESSURE: 62 MMHG | RESPIRATION RATE: 18 BRPM | TEMPERATURE: 96.5 F | BODY MASS INDEX: 40.43 KG/M2 | WEIGHT: 315 LBS

## 2021-10-27 DIAGNOSIS — E11.42 DIABETIC POLYNEUROPATHY ASSOCIATED WITH TYPE 2 DIABETES MELLITUS (HCC): ICD-10-CM

## 2021-10-27 DIAGNOSIS — M86.8X7 OTHER OSTEOMYELITIS OF LEFT FOOT (HCC): ICD-10-CM

## 2021-10-27 DIAGNOSIS — E11.621 DIABETIC ULCER OF LEFT HEEL ASSOCIATED WITH TYPE 2 DIABETES MELLITUS, WITH NECROSIS OF BONE (HCC): Primary | ICD-10-CM

## 2021-10-27 DIAGNOSIS — L97.415 DIABETIC ULCER OF RIGHT MIDFOOT ASSOCIATED WITH TYPE 2 DIABETES MELLITUS, WITH MUSCLE INVOLVEMENT WITHOUT EVIDENCE OF NECROSIS (HCC): ICD-10-CM

## 2021-10-27 DIAGNOSIS — E11.621 DIABETIC ULCER OF RIGHT MIDFOOT ASSOCIATED WITH TYPE 2 DIABETES MELLITUS, WITH MUSCLE INVOLVEMENT WITHOUT EVIDENCE OF NECROSIS (HCC): ICD-10-CM

## 2021-10-27 DIAGNOSIS — L97.424 DIABETIC ULCER OF LEFT HEEL ASSOCIATED WITH TYPE 2 DIABETES MELLITUS, WITH NECROSIS OF BONE (HCC): Primary | ICD-10-CM

## 2021-10-27 LAB
GLUCOSE BLDC GLUCOMTR-MCNC: 123 MG/DL (ref 70–99)
GLUCOSE BLDC GLUCOMTR-MCNC: 154 MG/DL (ref 70–99)

## 2021-10-27 PROCEDURE — 11042 DBRDMT SUBQ TIS 1ST 20SQCM/<: CPT | Performed by: EMERGENCY MEDICINE

## 2021-10-27 PROCEDURE — G0277 HBOT, FULL BODY CHAMBER, 30M: HCPCS

## 2021-10-27 PROCEDURE — 99183 HYPERBARIC OXYGEN THERAPY: CPT | Performed by: EMERGENCY MEDICINE

## 2021-10-27 PROCEDURE — 82962 GLUCOSE BLOOD TEST: CPT | Performed by: EMERGENCY MEDICINE

## 2021-10-27 NOTE — PROGRESS NOTES
Chief Complaint  Wound Check (DM FOOT ULCERS (BS: 123))    Subjective        Wound Check      Patient is a 60-year-old type II diabetic with neuropathy and insulin dependence.  He has been followed in the wound care clinic for nonhealing ulcers on his left heel and right distal midfoot. MRI showed osteomyelitis of the left heel. He had a PICC line placed 08/20/2021 and is undergoing IV antibiotics daily (ertapenem).  He recently started to undergo hyperbaric oxygen therapy for his osteomyelitis and is tolerating it well.  He is trying to control his sugars better and really watch what he eats.  He says he has a hard time offloading the wounds because he still has to do his ADLs and go to the laundromat etc.    He says he has been continuing to try to manage his dietary intake and eating more plant-based foods and cutting out fried foods and carbs as much as possible to improve his glucose control.  He has recently seen Kami Garcia for his diabetes and says he has already lost quite a bit of weight on his new medication and diet plan, but would like to lose another 50 pounds to start to really feel better he says.    He completed his full course of IV antibiotics and is continuing with HBO.  He missed a few days last week because he was not feeling well.    He has been doing PolyMem silver to both plantar wounds and they seem to be improving.      Allergies:  Adhesive tape      Current Outpatient Medications:   •  alfuzosin (UROXATRAL) 10 MG 24 hr tablet, Take 10 mg by mouth Daily., Disp: , Rfl:   •  apixaban (Eliquis) 5 MG tablet tablet, Take 10 mg by mouth 2 (two) times a day., Disp: , Rfl:   •  buPROPion XL (WELLBUTRIN XL) 300 MG 24 hr tablet, Take 300 mg by mouth 2 (Two) Times a Day., Disp: , Rfl:   •  citalopram (CeleXA) 10 MG tablet, TAKE (1) TABLET BY MOUTH 1 TIME PER DAY AT BEDTIME, Disp: , Rfl:   •  glucose blood test strip, Test blood sugar 3 times a day, Disp: 100 each, Rfl: 5  •  insulin aspart  (NovoLOG) 100 UNIT/ML injection, Inject 20 Units under the skin into the appropriate area as directed 3 (Three) Times a Day Before Meals. Take up to 20 units as instructed, Disp: 20 mL, Rfl: 3  •  insulin detemir (LEVEMIR) 100 UNIT/ML injection, Inject 50 Units under the skin into the appropriate area as directed Every Night. Take 40 units and adjust to 50 as instructed, Disp: 20 mL, Rfl: 3  •  lisinopril (PRINIVIL,ZESTRIL) 20 MG tablet, Take 20 mg by mouth Daily., Disp: , Rfl:   •  metFORMIN (GLUCOPHAGE) 1000 MG tablet, Take 1,000 mg by mouth 2 (two) times a day., Disp: , Rfl:   •  metoprolol succinate XL (TOPROL-XL) 50 MG 24 hr tablet, Take 50 mg by mouth Daily., Disp: , Rfl:   •  sertraline (ZOLOFT) 100 MG tablet, Take 100 mg by mouth Daily., Disp: , Rfl:   •  simvastatin (Zocor) 20 MG tablet, Take 20 mg by mouth Every Night., Disp: , Rfl:   •  Trulicity 0.75 MG/0.5ML solution pen-injector, INJECT ON SYRINGE INTO THE SKIN WEEKLY, Disp: 2 mL, Rfl: 1    Past Medical History:   Diagnosis Date   • Absence of toe of right foot    • Acute osteomyelitis of left calcaneus  8/18/2021   • Anxiety and depression    • Arthritis    • Claustrophobia    • Corns and callus    • Diabetic ulcer of left heel associated with type 2 DM 8/18/2021   • Diabetic ulcer of left heel associated with type 2 DM 7/6/2021   • Diabetic ulcer of right midfoot associated with type 2 DM 8/18/2021   • Difficulty walking    • Essential hypertension 8/31/2021   • Hammertoe    • Hyperlipidemia LDL goal <100 8/31/2021   • Ingrown toenail    • Obesity    • Paroxysmal atrial fibrillation 8/31/2021   • Polyneuropathy    • Pressure ulcer, stage 1    • Tinea unguium    • Type 2 diabetes mellitus with polyneuropathy      Past Surgical History:   Procedure Laterality Date   • CYST REMOVAL      center of back; benign   • INCISION AND DRAINAGE ABSCESS      back   • OTHER SURGICAL HISTORY      Surgical clips left foot   • TOE SURGERY Right     Removal of 5th toe  "  • WRIST SURGERY Left     repair of injury     Social History     Socioeconomic History   • Marital status: Single   Tobacco Use   • Smoking status: Former Smoker     Packs/day: 1.00     Types: Cigarettes     Quit date: 1991     Years since quittin.1   • Smokeless tobacco: Never Used   Vaping Use   • Vaping Use: Never used   Substance and Sexual Activity   • Alcohol use: Yes     Comment: Rare   • Drug use: Not Currently   • Sexual activity: Defer     Family History   Problem Relation Age of Onset   • Heart disease Mother    • Heart disease Father    • Cancer Father         Unspecified   • Heart disease Brother          Objective     Vitals:    10/27/21 1048   BP: 102/62   BP Location: Left arm   Patient Position: Sitting   Pulse: 85   Resp: 18   Temp: 96.5 °F (35.8 °C)   TempSrc: Temporal   Weight: (!) 161 kg (356 lb)   Height: 188 cm (74\")   PainSc: 10-Worst pain ever     Body mass index is 45.71 kg/m².    STEADI Fall Risk Assessment has not been completed.     Review of Systems     ROS:  No fevers, chills, sweats, or body aches. No shortness of breath.     I have reviewed the HPI and ROS as documented by MA/RN. Shea Daniels MD    Physical Exam     NAD, well dressed, well nourished  Normocephalic, atraumatic  EOMI, no scleral icterus  No respiratory distress, no cough  AAOx3, pleasant, cooperative  Wounds continue to slowly improve and get more shallow.  Recurrent callus forms quickly between visits but is also improving somewhat.  No erythema, purulence, induration, or fluctuance.  LLE wound on heel remains tender with pressure but not with sharp stimuli.  See photos below for details.                    Result Review :  The following data was reviewed by: Shea Daniels MD on 10/27/2021:    Prior wound photos, prior office notes.  Nursing measurements.      Wound debridement  Performed by: Shea Daniels MD  Authorized by: Shea Daniels MD     Correct patient: Identification verified by two " methods:     Verbally and Date of birth  Correct procedure/consent    Correct site/side    Correct equipment as applicable    How many wounds are you performing a debridement on?:  2  Wound 1:     Nursing Documentation:     Wound Location:  Left plantar foot  Measurements:     The total amount debrided on this wound was under 20 cm2.     Provider Documentation    Debridement type:  Sharp/excisional    Betadine      Other:  Sterile 10 blade scalpel, sterile sharp curette     None    Tissue debrided:  Moderate    Level removed:  Subcutaneous    Patient tolerance:  Good    Hemostasis achieved by:  Direct pressure    Wound Bed Post Debridement: See Photo    Wound 2:     Wound Location:  Right plantar foot   Measurements:    The total amount debrided on this wound was under 20 cm2.      Provider Documentation:     Debridement type:  Sharp/Excisional    Betadine      Other:  Sterile 10 blade scalpel     None    Tissue debrided:  Small    Level removed:  Subcutaneous    Patient tolerance:  Good    Hemostasis achieved by:  Direct Pressure and Silver Nitrate    Wound Bed Post Debridement: See Photo      Description:  Total subcutaneous debridement area for both wounds is less than 20 cm²      Post debridement photos:                         Assessment and Plan   Diagnoses and all orders for this visit:    1. 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 Other osteomyelitis of left foot (HCC)    3. Diabetic ulcer of right midfoot associated with type 2 diabetes mellitus, with muscle involvement without evidence of necrosis (HCC)    4. Diabetic polyneuropathy associated with type 2 diabetes mellitus (HCC)    Other orders  -     Wound debridement         Continue full course of hyperbaric oxygen therapy.  PolyMem silver to bilateral wounds every other day.  Wounds are continuing to slowly improve.    Recheck in 1 week.  Again advised to offload the wounds,  particularly on the left heel, but the patient says this is hard to do.  Continue weight loss and improved diabetic control.    Patient was given instructions and counseling regarding their condition or for health maintenance advice, as well as the wound care plan and recommendations. They understand and agree with the plan.  They will follow back up here in the clinic but are instructed to contact us in the interim should they have any new, returning, or worsening symptoms or concerns. Please see specific information pulled into the AVS if appropriate.       Follow Up   Return in about 1 week (around 11/3/2021) for Recheck.      Shea Daniels MD

## 2021-10-27 NOTE — PROCEDURES
Vitals:    10/27/21 0850   BP: 102/66  Comment: pre treatment   BP Location: Left arm   Patient Position: Sitting   Pulse: 82   Resp: 16   Temp: 97.6 °F (36.4 °C)   TempSrc: Temporal   PainSc: 0-No pain        Procedure   WOUND CARE HYPERBARIC  Performed by: Shea Daniels MD  Authorized by: Shea Daniels MD     Treatment number: 21   Mobility  The following were used for the patient's mobility: ambulatory  The patient appears alert and oriented  The lungs are clear.   TM's:     Right Ear:     -Intact      Left Ear:     -Intact  Pre-treatment Actions   The patient was voided/has a catheter.    Safety   A grounding device was attached.  The safety checklist was reviewed.   We identified the patient.  We identified the patient using the following methods: Photo ID, verbally and date of birth.    Treatment Profile: 2.0   The total time of the procedure was 97 minutes.   Pressure    The patient's Leave Surface Rate was 9:03 PSI / minutes. The patient's Reach Bottom was 9:13 minutes. The patient's Leave Bottom Rate was 10:20 PSI / minutes. The patient's Return Surface was 10:30 minutes.   Post-Treatment Vital Signs and Assessment   Lungs:  Clear  Right TMs:  Intact  Left TMs:  Intact           Results for orders placed or performed in visit on 10/27/21   1. POC Glucose (Collected: 10/27/2021 10:40 AM)    Specimen: Blood   Result Value Ref Range    Glucose 123 (H) 70 - 99 mg/dL

## 2021-10-28 ENCOUNTER — HOSPITAL ENCOUNTER (OUTPATIENT)
Dept: INFUSION THERAPY | Facility: HOSPITAL | Age: 61
Discharge: HOME | End: 2021-10-28

## 2021-10-28 ENCOUNTER — PROCEDURE VISIT (OUTPATIENT)
Dept: WOUND CARE | Facility: HOSPITAL | Age: 61
End: 2021-10-28

## 2021-10-28 VITALS
DIASTOLIC BLOOD PRESSURE: 64 MMHG | HEART RATE: 74 BPM | RESPIRATION RATE: 20 BRPM | SYSTOLIC BLOOD PRESSURE: 113 MMHG | OXYGEN SATURATION: 100 % | TEMPERATURE: 97.6 F

## 2021-10-28 VITALS
DIASTOLIC BLOOD PRESSURE: 64 MMHG | HEART RATE: 73 BPM | SYSTOLIC BLOOD PRESSURE: 108 MMHG | RESPIRATION RATE: 16 BRPM | TEMPERATURE: 97.5 F

## 2021-10-28 DIAGNOSIS — M86.8X7 OTHER OSTEOMYELITIS OF LEFT FOOT (HCC): ICD-10-CM

## 2021-10-28 DIAGNOSIS — L97.415 DIABETIC ULCER OF RIGHT MIDFOOT ASSOCIATED WITH TYPE 2 DIABETES MELLITUS, WITH MUSCLE INVOLVEMENT WITHOUT EVIDENCE OF NECROSIS (HCC): ICD-10-CM

## 2021-10-28 DIAGNOSIS — M86.172 ACUTE OSTEOMYELITIS OF LEFT CALCANEUS (HCC): Primary | ICD-10-CM

## 2021-10-28 DIAGNOSIS — E11.621 DIABETIC ULCER OF LEFT HEEL ASSOCIATED WITH TYPE 2 DIABETES MELLITUS, WITH NECROSIS OF BONE (HCC): ICD-10-CM

## 2021-10-28 DIAGNOSIS — E11.621 DIABETIC ULCER OF RIGHT MIDFOOT ASSOCIATED WITH TYPE 2 DIABETES MELLITUS, WITH MUSCLE INVOLVEMENT WITHOUT EVIDENCE OF NECROSIS (HCC): ICD-10-CM

## 2021-10-28 DIAGNOSIS — L97.424 DIABETIC ULCER OF LEFT HEEL ASSOCIATED WITH TYPE 2 DIABETES MELLITUS, WITH NECROSIS OF BONE (HCC): ICD-10-CM

## 2021-10-28 LAB
GLUCOSE BLDC GLUCOMTR-MCNC: 139 MG/DL (ref 70–99)
GLUCOSE BLDC GLUCOMTR-MCNC: 141 MG/DL (ref 70–99)

## 2021-10-28 PROCEDURE — 82962 GLUCOSE BLOOD TEST: CPT | Performed by: EMERGENCY MEDICINE

## 2021-10-28 PROCEDURE — 82962 GLUCOSE BLOOD TEST: CPT

## 2021-10-28 PROCEDURE — G0277 HBOT, FULL BODY CHAMBER, 30M: HCPCS

## 2021-10-28 PROCEDURE — 99183 HYPERBARIC OXYGEN THERAPY: CPT | Performed by: EMERGENCY MEDICINE

## 2021-10-28 PROCEDURE — G0463 HOSPITAL OUTPT CLINIC VISIT: HCPCS

## 2021-10-28 NOTE — PROCEDURES
Vitals:    10/28/21 1114   BP: 108/64  Comment: pre treatment   BP Location: Left arm   Patient Position: Sitting   Pulse: 73   Resp: 16   Temp: 97.5 °F (36.4 °C)   TempSrc: Temporal   PainSc: 0-No pain        Procedure   WOUND CARE HYPERBARIC  Performed by: Shea Daniels MD  Authorized by: Shea Daniels MD     Treatment number: 22   Mobility  The following were used for the patient's mobility: ambulatory  The patient appears alert and oriented  TM's:     Right Ear:     -Intact      Left Ear:     -Tubes  Pre-treatment Actions   The patient was voided/has a catheter.    Safety   A grounding device was attached.  The safety checklist was reviewed.   We identified the patient.  We identified the patient using the following methods: Photo ID, verbally and date of birth.    Treatment Profile: 2.0    The total time of the procedure was 110 minutes.   Pressure    The patient's Leave Surface Rate was 8:58 PSI / minutes. The patient's Reach Bottom was 9:08 minutes. The patient's Leave Bottom Rate was 10:38 PSI / minutes. The patient's Return Surface was 10:48 minutes.   Post-Treatment Vital Signs and Assessment   Lungs:  Clear  Right TMs:  Intact  Left TMs:  Intact           Results for orders placed or performed in visit on 10/28/21   1. POC Glucose (Collected: 10/28/2021 10:58 AM)    Specimen: Blood   Result Value Ref Range    Glucose 139 (H) 70 - 99 mg/dL

## 2021-10-29 ENCOUNTER — APPOINTMENT (OUTPATIENT)
Dept: INFUSION THERAPY | Facility: HOSPITAL | Age: 61
End: 2021-10-29

## 2021-10-29 ENCOUNTER — PROCEDURE VISIT (OUTPATIENT)
Dept: WOUND CARE | Facility: HOSPITAL | Age: 61
End: 2021-10-29

## 2021-10-29 VITALS
SYSTOLIC BLOOD PRESSURE: 94 MMHG | DIASTOLIC BLOOD PRESSURE: 56 MMHG | HEART RATE: 80 BPM | TEMPERATURE: 97.8 F | RESPIRATION RATE: 16 BRPM

## 2021-10-29 DIAGNOSIS — E11.621 DIABETIC ULCER OF LEFT HEEL ASSOCIATED WITH TYPE 2 DIABETES MELLITUS, WITH NECROSIS OF BONE (HCC): ICD-10-CM

## 2021-10-29 DIAGNOSIS — L97.424 DIABETIC ULCER OF LEFT HEEL ASSOCIATED WITH TYPE 2 DIABETES MELLITUS, WITH NECROSIS OF BONE (HCC): ICD-10-CM

## 2021-10-29 DIAGNOSIS — M86.8X7 OTHER OSTEOMYELITIS OF LEFT FOOT (HCC): ICD-10-CM

## 2021-10-29 LAB
GLUCOSE BLDC GLUCOMTR-MCNC: 123 MG/DL (ref 70–99)
GLUCOSE BLDC GLUCOMTR-MCNC: 124 MG/DL (ref 70–99)

## 2021-10-29 PROCEDURE — G0277 HBOT, FULL BODY CHAMBER, 30M: HCPCS

## 2021-10-29 PROCEDURE — 99183 HYPERBARIC OXYGEN THERAPY: CPT | Performed by: SURGERY

## 2021-10-29 PROCEDURE — 82962 GLUCOSE BLOOD TEST: CPT

## 2021-10-29 NOTE — PROCEDURES
Vitals:    10/29/21 0845   BP: 94/56  Comment: pre treatment   BP Location: Left arm   Patient Position: Sitting   Pulse: 78   Resp: 16   Temp: 97.6 °F (36.4 °C)   TempSrc: Temporal   PainSc: 0-No pain        Procedure   WOUND CARE HYPERBARIC  Performed by: Narayan Dumont MD  Authorized by: Shea Daniels MD     Treatment number: 23   Mobility  The following were used for the patient's mobility: ambulatory  The patient appears alert and oriented  The lungs are clear.   TM's:     Right Ear:     -Intact      Left Ear:     -Intact  Pre-treatment Actions   The patient was voided/has a catheter.    Safety   A grounding device was attached.  The safety checklist was reviewed.   We identified the patient.  We identified the patient using the following methods: Photo ID, verbally and date of birth.    Treatment Profile: 2.0   The total time of the procedure was 110 minutes.   Pressure    The patient's Leave Surface Rate was 8:57 PSI / minutes. The patient's Reach Bottom was 9:07 minutes. The patient's Leave Bottom Rate was 10:37 PSI / minutes. The patient's Return Surface was 10:47 minutes.   Post-Treatment Vital Signs and Assessment   Lungs:  Clear  Right TMs:  Intact  Left TMs:  Intact           Results for orders placed or performed in visit on 10/29/21   1. POC Glucose (Collected: 10/29/2021 10:57 AM)    Specimen: Blood   Result Value Ref Range    Glucose 124 (H) 70 - 99 mg/dL

## 2021-11-01 ENCOUNTER — PROCEDURE VISIT (OUTPATIENT)
Dept: WOUND CARE | Facility: HOSPITAL | Age: 61
End: 2021-11-01

## 2021-11-01 ENCOUNTER — HOSPITAL ENCOUNTER (OUTPATIENT)
Dept: INFUSION THERAPY | Facility: HOSPITAL | Age: 61
Setting detail: INFUSION SERIES
Discharge: HOME | End: 2021-11-01

## 2021-11-01 VITALS
HEART RATE: 83 BPM | SYSTOLIC BLOOD PRESSURE: 108 MMHG | RESPIRATION RATE: 20 BRPM | DIASTOLIC BLOOD PRESSURE: 72 MMHG | TEMPERATURE: 97.5 F | OXYGEN SATURATION: 97 %

## 2021-11-01 VITALS
HEART RATE: 79 BPM | SYSTOLIC BLOOD PRESSURE: 108 MMHG | DIASTOLIC BLOOD PRESSURE: 64 MMHG | TEMPERATURE: 98 F | RESPIRATION RATE: 16 BRPM

## 2021-11-01 DIAGNOSIS — M86.172 ACUTE OSTEOMYELITIS OF LEFT CALCANEUS (HCC): Primary | ICD-10-CM

## 2021-11-01 DIAGNOSIS — M86.8X7 OTHER OSTEOMYELITIS OF LEFT FOOT (HCC): ICD-10-CM

## 2021-11-01 DIAGNOSIS — L97.424 DIABETIC ULCER OF LEFT HEEL ASSOCIATED WITH TYPE 2 DIABETES MELLITUS, WITH NECROSIS OF BONE (HCC): ICD-10-CM

## 2021-11-01 DIAGNOSIS — E11.621 DIABETIC ULCER OF RIGHT MIDFOOT ASSOCIATED WITH TYPE 2 DIABETES MELLITUS, WITH MUSCLE INVOLVEMENT WITHOUT EVIDENCE OF NECROSIS (HCC): ICD-10-CM

## 2021-11-01 DIAGNOSIS — E11.621 DIABETIC ULCER OF LEFT HEEL ASSOCIATED WITH TYPE 2 DIABETES MELLITUS, WITH NECROSIS OF BONE (HCC): ICD-10-CM

## 2021-11-01 DIAGNOSIS — L97.415 DIABETIC ULCER OF RIGHT MIDFOOT ASSOCIATED WITH TYPE 2 DIABETES MELLITUS, WITH MUSCLE INVOLVEMENT WITHOUT EVIDENCE OF NECROSIS (HCC): ICD-10-CM

## 2021-11-01 LAB
GLUCOSE BLDC GLUCOMTR-MCNC: 251 MG/DL (ref 70–99)
GLUCOSE BLDC GLUCOMTR-MCNC: 251 MG/DL (ref 70–99)

## 2021-11-01 PROCEDURE — G0463 HOSPITAL OUTPT CLINIC VISIT: HCPCS

## 2021-11-01 PROCEDURE — 99183 HYPERBARIC OXYGEN THERAPY: CPT | Performed by: EMERGENCY MEDICINE

## 2021-11-01 PROCEDURE — G0277 HBOT, FULL BODY CHAMBER, 30M: HCPCS

## 2021-11-01 PROCEDURE — 82962 GLUCOSE BLOOD TEST: CPT | Performed by: EMERGENCY MEDICINE

## 2021-11-01 NOTE — PROCEDURES
Vitals:    11/01/21 0845   BP: 98/56  Comment: pre treatment   BP Location: Left arm   Patient Position: Sitting   Pulse: 52   Resp: 16   Temp: 97.3 °F (36.3 °C)   TempSrc: Temporal   PainSc: 0-No pain        Procedure   WOUND CARE HYPERBARIC  Performed by: Shea Daniels MD  Authorized by: Shea Daniels MD     Treatment number: 24   Mobility  The following were used for the patient's mobility: ambulatory  The patient appears alert and oriented  TM's:     Right Ear:     -Intact      Left Ear:     -Intact  Pre-treatment Actions   The patient was voided/has a catheter.    Safety   A grounding device was attached.  We identified the patient.  We identified the patient using the following methods: Photo ID, verbally and date of birth.    Treatment Profile: 2.0   The total time of the procedure was 110 minutes.   Pressure    The patient's Leave Surface Rate was 9:07 PSI / minutes. The patient's Reach Bottom was 9:17 minutes. The patient's Leave Bottom Rate was 10:42 PSI / minutes. The patient's Return Surface was 10:52 minutes.   Post-Treatment Vital Signs and Assessment   Lungs:  Clear  Right TMs:  Intact  Left TMs:  Intact           Results for orders placed or performed in visit on 11/01/21   1. POC Glucose (Collected: 11/1/2021 11:08 AM)    Specimen: Blood   Result Value Ref Range    Glucose 251 (H) 70 - 99 mg/dL

## 2021-11-01 NOTE — PROGRESS NOTES
Upon arrival the patient said he was feeling tired and a little bit short of breath from walking in.  Oxygen saturation was 100%, vital signs consistent with prior visits.  He says he thought maybe his A. fib was acting up.  He does have slight irregularity in the rhythm but good strong pulses and lungs are clear to auscultation bilaterally.  I advised the patient that if he was not feeling well he should forego hyperbarics and go to the ER to get checked out.  He says he is not feeling badly and wishes to do his hyperbaric oxygen treatment.  He then admits that he did not go to sleep until 5 AM and thinks may be that sweats bothering him.    He did decide to proceed with HBO treatment and tolerated it well and I was present to provide direct supervision.

## 2021-11-02 ENCOUNTER — APPOINTMENT (OUTPATIENT)
Dept: INFUSION THERAPY | Facility: HOSPITAL | Age: 61
End: 2021-11-02

## 2021-11-02 ENCOUNTER — PROCEDURE VISIT (OUTPATIENT)
Dept: WOUND CARE | Facility: HOSPITAL | Age: 61
End: 2021-11-02

## 2021-11-02 VITALS
HEART RATE: 84 BPM | RESPIRATION RATE: 16 BRPM | DIASTOLIC BLOOD PRESSURE: 64 MMHG | SYSTOLIC BLOOD PRESSURE: 102 MMHG | TEMPERATURE: 96.8 F

## 2021-11-02 DIAGNOSIS — L97.424 DIABETIC ULCER OF LEFT HEEL ASSOCIATED WITH TYPE 2 DIABETES MELLITUS, WITH NECROSIS OF BONE (HCC): ICD-10-CM

## 2021-11-02 DIAGNOSIS — E11.621 DIABETIC ULCER OF LEFT HEEL ASSOCIATED WITH TYPE 2 DIABETES MELLITUS, WITH NECROSIS OF BONE (HCC): ICD-10-CM

## 2021-11-02 DIAGNOSIS — M86.8X7 OTHER OSTEOMYELITIS OF LEFT FOOT (HCC): ICD-10-CM

## 2021-11-02 LAB
GLUCOSE BLDC GLUCOMTR-MCNC: 141 MG/DL (ref 70–99)
GLUCOSE BLDC GLUCOMTR-MCNC: 171 MG/DL (ref 70–99)

## 2021-11-02 PROCEDURE — 82962 GLUCOSE BLOOD TEST: CPT | Performed by: EMERGENCY MEDICINE

## 2021-11-02 PROCEDURE — G0277 HBOT, FULL BODY CHAMBER, 30M: HCPCS

## 2021-11-02 PROCEDURE — 99183 HYPERBARIC OXYGEN THERAPY: CPT | Performed by: EMERGENCY MEDICINE

## 2021-11-02 NOTE — PROCEDURES
Vitals:    11/02/21 1215   BP: 102/64  Comment: pre treatment   BP Location: Left arm   Patient Position: Sitting   Pulse: 84   Resp: 16   Temp: 96.8 °F (36 °C)   TempSrc: Temporal   PainSc: 0-No pain        Procedure   WOUND CARE HYPERBARIC  Performed by: Shea Daniels MD  Authorized by: Shea Daniels MD     Treatment number: 25   Mobility  The following were used for the patient's mobility: ambulatory  The patient appears alert and oriented  The lungs are clear.   TM's:     Right Ear:     -Intact      Left Ear:     -Intact  Pre-treatment Actions   The patient was voided/has a catheter.    Safety   The safety checklist was reviewed.   We identified the patient.  We identified the patient using the following methods: Photo ID, verbally and date of birth.    Treatment Profile: 2.0   The total time of the procedure was 110 minutes.   Pressure    The patient's Leave Surface Rate was 9:10 PSI / minutes. The patient's Reach Bottom was 9:20 minutes. The patient's Leave Bottom Rate was 10:50 PSI / minutes. The patient's Return Surface was 11:00 minutes.   Post-Treatment Vital Signs and Assessment   Lungs:  Clear  Right TMs:  Intact  Left TMs:  Intact           Results for orders placed or performed in visit on 11/02/21   1. POC Glucose (Collected: 11/2/2021 11:12 AM)    Specimen: Blood   Result Value Ref Range    Glucose 141 (H) 70 - 99 mg/dL

## 2021-11-03 ENCOUNTER — APPOINTMENT (OUTPATIENT)
Dept: INFUSION THERAPY | Facility: HOSPITAL | Age: 61
End: 2021-11-03

## 2021-11-04 ENCOUNTER — APPOINTMENT (OUTPATIENT)
Dept: INFUSION THERAPY | Facility: HOSPITAL | Age: 61
End: 2021-11-04

## 2021-11-04 ENCOUNTER — PROCEDURE VISIT (OUTPATIENT)
Dept: WOUND CARE | Facility: HOSPITAL | Age: 61
End: 2021-11-04

## 2021-11-04 VITALS
SYSTOLIC BLOOD PRESSURE: 106 MMHG | RESPIRATION RATE: 16 BRPM | HEART RATE: 72 BPM | DIASTOLIC BLOOD PRESSURE: 54 MMHG | TEMPERATURE: 97.7 F

## 2021-11-04 DIAGNOSIS — L97.424 DIABETIC ULCER OF LEFT HEEL ASSOCIATED WITH TYPE 2 DIABETES MELLITUS, WITH NECROSIS OF BONE (HCC): ICD-10-CM

## 2021-11-04 DIAGNOSIS — E11.621 DIABETIC ULCER OF LEFT HEEL ASSOCIATED WITH TYPE 2 DIABETES MELLITUS, WITH NECROSIS OF BONE (HCC): ICD-10-CM

## 2021-11-04 DIAGNOSIS — M86.8X7 OTHER OSTEOMYELITIS OF LEFT FOOT (HCC): ICD-10-CM

## 2021-11-04 LAB
GLUCOSE BLDC GLUCOMTR-MCNC: 206 MG/DL (ref 70–99)
GLUCOSE BLDC GLUCOMTR-MCNC: 217 MG/DL (ref 70–99)

## 2021-11-04 PROCEDURE — 82962 GLUCOSE BLOOD TEST: CPT | Performed by: EMERGENCY MEDICINE

## 2021-11-04 PROCEDURE — 99183 HYPERBARIC OXYGEN THERAPY: CPT | Performed by: EMERGENCY MEDICINE

## 2021-11-04 PROCEDURE — G0277 HBOT, FULL BODY CHAMBER, 30M: HCPCS

## 2021-11-04 NOTE — PROCEDURES
Vitals:    11/04/21 0840   BP: 108/64  Comment: pre treatment   BP Location: Left arm   Patient Position: Sitting   Pulse: 76   Resp: 16   Temp: 97.4 °F (36.3 °C)   TempSrc: Temporal   PainSc: 0-No pain        Procedure   WOUND CARE HYPERBARIC  Performed by: Shea Daniels MD  Authorized by: Shea Daniels MD     Treatment number: 26   Mobility  The following were used for the patient's mobility: ambulatory  The patient appears alert and oriented  The lungs are clear.   TM's:     Right Ear:     -Intact      Left Ear:     -Intact  Pre-treatment Actions   The patient was voided/has a catheter.    Safety   A grounding device was attached.  The safety checklist was reviewed.   We identified the patient.  We identified the patient using the following methods: Photo ID, verbally and date of birth.    Treatment Profile: 2.0    The total time of the procedure was 110 minutes.   Pressure    The patient's Leave Surface Rate was 8:52 PSI / minutes. The patient's Reach Bottom was 9:02 minutes. The patient's Leave Bottom Rate was 10:32 PSI / minutes. The patient's Return Surface was 10:42 minutes.   Post-Treatment Vital Signs and Assessment   Lungs:  Clear  Right TMs:  Intact  Left TMs:  Intact           Results for orders placed or performed in visit on 11/04/21   1. POC Glucose (Collected: 11/4/2021  8:40 AM)    Specimen: Blood   Result Value Ref Range    Glucose 217 (H) 70 - 99 mg/dL

## 2021-11-05 ENCOUNTER — LAB (OUTPATIENT)
Dept: LAB | Facility: HOSPITAL | Age: 61
End: 2021-11-05

## 2021-11-05 ENCOUNTER — TELEPHONE (OUTPATIENT)
Dept: DIABETES SERVICES | Facility: HOSPITAL | Age: 61
End: 2021-11-05

## 2021-11-05 ENCOUNTER — HOSPITAL ENCOUNTER (OUTPATIENT)
Dept: INFUSION THERAPY | Facility: HOSPITAL | Age: 61
Discharge: HOME | End: 2021-11-05

## 2021-11-05 VITALS
RESPIRATION RATE: 20 BRPM | DIASTOLIC BLOOD PRESSURE: 67 MMHG | HEART RATE: 68 BPM | WEIGHT: 315 LBS | HEIGHT: 74 IN | BODY MASS INDEX: 40.43 KG/M2 | TEMPERATURE: 98 F | SYSTOLIC BLOOD PRESSURE: 113 MMHG | OXYGEN SATURATION: 100 %

## 2021-11-05 DIAGNOSIS — T14.8XXA WOUND INFECTION: Primary | ICD-10-CM

## 2021-11-05 DIAGNOSIS — T14.8XXA WOUND INFECTION: ICD-10-CM

## 2021-11-05 DIAGNOSIS — E11.621 DIABETIC ULCER OF RIGHT MIDFOOT ASSOCIATED WITH TYPE 2 DIABETES MELLITUS, WITH MUSCLE INVOLVEMENT WITHOUT EVIDENCE OF NECROSIS (HCC): ICD-10-CM

## 2021-11-05 DIAGNOSIS — L97.424 DIABETIC ULCER OF LEFT HEEL ASSOCIATED WITH TYPE 2 DIABETES MELLITUS, WITH NECROSIS OF BONE (HCC): ICD-10-CM

## 2021-11-05 DIAGNOSIS — L97.415 DIABETIC ULCER OF RIGHT MIDFOOT ASSOCIATED WITH TYPE 2 DIABETES MELLITUS, WITH MUSCLE INVOLVEMENT WITHOUT EVIDENCE OF NECROSIS (HCC): ICD-10-CM

## 2021-11-05 DIAGNOSIS — E11.621 DIABETIC ULCER OF LEFT HEEL ASSOCIATED WITH TYPE 2 DIABETES MELLITUS, WITH NECROSIS OF BONE (HCC): ICD-10-CM

## 2021-11-05 DIAGNOSIS — M86.172 ACUTE OSTEOMYELITIS OF LEFT CALCANEUS (HCC): ICD-10-CM

## 2021-11-05 DIAGNOSIS — L08.9 WOUND INFECTION: Primary | ICD-10-CM

## 2021-11-05 DIAGNOSIS — L08.9 WOUND INFECTION: ICD-10-CM

## 2021-11-05 LAB
ALBUMIN SERPL-MCNC: 3.7 G/DL (ref 3.5–5.2)
ALBUMIN/GLOB SERPL: 1.3 G/DL
ALP SERPL-CCNC: 184 U/L (ref 39–117)
ALT SERPL W P-5'-P-CCNC: 47 U/L (ref 1–41)
ANION GAP SERPL CALCULATED.3IONS-SCNC: 9.9 MMOL/L (ref 5–15)
AST SERPL-CCNC: 51 U/L (ref 1–40)
BILIRUB SERPL-MCNC: 1.2 MG/DL (ref 0–1.2)
BUN SERPL-MCNC: 12 MG/DL (ref 8–23)
BUN/CREAT SERPL: 16.7 (ref 7–25)
CALCIUM SPEC-SCNC: 8.8 MG/DL (ref 8.6–10.5)
CHLORIDE SERPL-SCNC: 102 MMOL/L (ref 98–107)
CO2 SERPL-SCNC: 24.1 MMOL/L (ref 22–29)
CREAT SERPL-MCNC: 0.72 MG/DL (ref 0.76–1.27)
CRP SERPL-MCNC: 1.93 MG/DL (ref 0–0.5)
GFR SERPL CREATININE-BSD FRML MDRD: 110 ML/MIN/1.73
GLOBULIN UR ELPH-MCNC: 2.9 GM/DL
GLUCOSE SERPL-MCNC: 150 MG/DL (ref 65–99)
POTASSIUM SERPL-SCNC: 4.6 MMOL/L (ref 3.5–5.2)
PROT SERPL-MCNC: 6.6 G/DL (ref 6–8.5)
SODIUM SERPL-SCNC: 136 MMOL/L (ref 136–145)

## 2021-11-05 PROCEDURE — 36415 COLL VENOUS BLD VENIPUNCTURE: CPT

## 2021-11-05 PROCEDURE — 86140 C-REACTIVE PROTEIN: CPT

## 2021-11-05 PROCEDURE — 85007 BL SMEAR W/DIFF WBC COUNT: CPT | Performed by: EMERGENCY MEDICINE

## 2021-11-05 PROCEDURE — 80053 COMPREHEN METABOLIC PANEL: CPT | Performed by: EMERGENCY MEDICINE

## 2021-11-05 PROCEDURE — G0463 HOSPITAL OUTPT CLINIC VISIT: HCPCS

## 2021-11-05 PROCEDURE — 85025 COMPLETE CBC W/AUTO DIFF WBC: CPT | Performed by: EMERGENCY MEDICINE

## 2021-11-05 PROCEDURE — 85652 RBC SED RATE AUTOMATED: CPT | Performed by: EMERGENCY MEDICINE

## 2021-11-05 NOTE — TELEPHONE ENCOUNTER
PATIENT REQUESTING A RETURN CALL. PHARMACY HAS NOT FILLED TRULICITY DUE TO INSURANCE DENIAL/FORMULARY.  ALTERNATE LIST WAS PROVIDED  AND HE WANTS TO KNOW WHAT IF ANY DIFFERENT MEDICATION HAS BEEN SUBMITTED.

## 2021-11-06 ENCOUNTER — APPOINTMENT (OUTPATIENT)
Dept: INFUSION THERAPY | Facility: HOSPITAL | Age: 61
End: 2021-11-06

## 2021-11-06 RX ORDER — DULAGLUTIDE 1.5 MG/.5ML
1.5 INJECTION, SOLUTION SUBCUTANEOUS
Qty: 2 ML | Refills: 3 | Status: SHIPPED | OUTPATIENT
Start: 2021-11-06 | End: 2022-05-23

## 2021-11-08 ENCOUNTER — APPOINTMENT (OUTPATIENT)
Dept: INFUSION THERAPY | Facility: HOSPITAL | Age: 61
End: 2021-11-08

## 2021-11-08 ENCOUNTER — PROCEDURE VISIT (OUTPATIENT)
Dept: WOUND CARE | Facility: HOSPITAL | Age: 61
End: 2021-11-08

## 2021-11-08 VITALS
HEART RATE: 65 BPM | SYSTOLIC BLOOD PRESSURE: 104 MMHG | TEMPERATURE: 97.3 F | RESPIRATION RATE: 16 BRPM | DIASTOLIC BLOOD PRESSURE: 64 MMHG

## 2021-11-08 DIAGNOSIS — E11.621 DIABETIC ULCER OF LEFT HEEL ASSOCIATED WITH TYPE 2 DIABETES MELLITUS, WITH NECROSIS OF BONE (HCC): ICD-10-CM

## 2021-11-08 DIAGNOSIS — L97.424 DIABETIC ULCER OF LEFT HEEL ASSOCIATED WITH TYPE 2 DIABETES MELLITUS, WITH NECROSIS OF BONE (HCC): ICD-10-CM

## 2021-11-08 DIAGNOSIS — M86.8X7 OTHER OSTEOMYELITIS OF LEFT FOOT (HCC): ICD-10-CM

## 2021-11-08 LAB
GLUCOSE BLDC GLUCOMTR-MCNC: 126 MG/DL (ref 70–99)
GLUCOSE BLDC GLUCOMTR-MCNC: 157 MG/DL (ref 70–99)

## 2021-11-08 PROCEDURE — G0277 HBOT, FULL BODY CHAMBER, 30M: HCPCS

## 2021-11-08 PROCEDURE — 99183 HYPERBARIC OXYGEN THERAPY: CPT | Performed by: EMERGENCY MEDICINE

## 2021-11-08 PROCEDURE — 82962 GLUCOSE BLOOD TEST: CPT | Performed by: EMERGENCY MEDICINE

## 2021-11-08 NOTE — PROCEDURES
Vitals:    11/08/21 0840   BP: 102/64  Comment: pre treatment   BP Location: Left arm   Patient Position: Sitting   Pulse: 90   Resp: 16   Temp: 97.3 °F (36.3 °C)   TempSrc: Temporal   PainSc: 0-No pain        Procedure   WOUND CARE HYPERBARIC  Performed by: Shea Daniels MD  Authorized by: Shea Daniels MD     Treatment number: 27   Mobility  The following were used for the patient's mobility: ambulatory  The patient appears alert and oriented  The lungs are clear.   TM's:     Right Ear:     -Intact      Left Ear:     -Intact  Pre-treatment Actions   The patient was voided/has a catheter.    Safety   A grounding device was attached.  The safety checklist was reviewed.   We identified the patient.  We identified the patient using the following methods: Photo ID, verbally and date of birth.    Treatment Profile: 2.0   The total time of the procedure was 110 minutes.   Pressure    The patient's Leave Surface Rate was 8:54 PSI / minutes. The patient's Reach Bottom was 9:04 minutes. The patient's Leave Bottom Rate was 10:34 PSI / minutes. The patient's Return Surface was 10:44 minutes.   Post-Treatment Vital Signs and Assessment   Lungs:  Clear  Right TMs:  Intact  Left TMs:  Intact           Results for orders placed or performed in visit on 11/08/21   1. POC Glucose (Collected: 11/8/2021 10:54 AM)    Specimen: Blood   Result Value Ref Range    Glucose 126 (H) 70 - 99 mg/dL

## 2021-11-09 ENCOUNTER — PROCEDURE VISIT (OUTPATIENT)
Dept: WOUND CARE | Facility: HOSPITAL | Age: 61
End: 2021-11-09

## 2021-11-09 ENCOUNTER — HOSPITAL ENCOUNTER (OUTPATIENT)
Dept: INFUSION THERAPY | Facility: HOSPITAL | Age: 61
Discharge: HOME | End: 2021-11-09

## 2021-11-09 VITALS
RESPIRATION RATE: 16 BRPM | TEMPERATURE: 97.1 F | HEART RATE: 72 BPM | SYSTOLIC BLOOD PRESSURE: 98 MMHG | DIASTOLIC BLOOD PRESSURE: 64 MMHG

## 2021-11-09 VITALS
WEIGHT: 315 LBS | TEMPERATURE: 97.9 F | SYSTOLIC BLOOD PRESSURE: 107 MMHG | OXYGEN SATURATION: 99 % | DIASTOLIC BLOOD PRESSURE: 78 MMHG | HEART RATE: 73 BPM | HEIGHT: 74 IN | BODY MASS INDEX: 40.43 KG/M2 | RESPIRATION RATE: 20 BRPM

## 2021-11-09 DIAGNOSIS — M86.8X7 OTHER OSTEOMYELITIS OF LEFT FOOT (HCC): ICD-10-CM

## 2021-11-09 DIAGNOSIS — M86.172 ACUTE OSTEOMYELITIS OF LEFT CALCANEUS (HCC): Primary | ICD-10-CM

## 2021-11-09 DIAGNOSIS — L97.424 DIABETIC ULCER OF LEFT HEEL ASSOCIATED WITH TYPE 2 DIABETES MELLITUS, WITH NECROSIS OF BONE (HCC): ICD-10-CM

## 2021-11-09 DIAGNOSIS — E11.621 DIABETIC ULCER OF LEFT HEEL ASSOCIATED WITH TYPE 2 DIABETES MELLITUS, WITH NECROSIS OF BONE (HCC): ICD-10-CM

## 2021-11-09 DIAGNOSIS — L97.415 DIABETIC ULCER OF RIGHT MIDFOOT ASSOCIATED WITH TYPE 2 DIABETES MELLITUS, WITH MUSCLE INVOLVEMENT WITHOUT EVIDENCE OF NECROSIS (HCC): ICD-10-CM

## 2021-11-09 DIAGNOSIS — E11.621 DIABETIC ULCER OF RIGHT MIDFOOT ASSOCIATED WITH TYPE 2 DIABETES MELLITUS, WITH MUSCLE INVOLVEMENT WITHOUT EVIDENCE OF NECROSIS (HCC): ICD-10-CM

## 2021-11-09 LAB
GLUCOSE BLDC GLUCOMTR-MCNC: 153 MG/DL (ref 70–99)
GLUCOSE BLDC GLUCOMTR-MCNC: 171 MG/DL (ref 70–99)
GLUCOSE BLDC GLUCOMTR-MCNC: NORMAL MG/DL

## 2021-11-09 PROCEDURE — G0277 HBOT, FULL BODY CHAMBER, 30M: HCPCS

## 2021-11-09 PROCEDURE — G0463 HOSPITAL OUTPT CLINIC VISIT: HCPCS

## 2021-11-09 PROCEDURE — 99183 HYPERBARIC OXYGEN THERAPY: CPT | Performed by: EMERGENCY MEDICINE

## 2021-11-09 PROCEDURE — 82962 GLUCOSE BLOOD TEST: CPT

## 2021-11-09 NOTE — PROCEDURES
Vitals:    11/09/21 0840   BP: 102/62  Comment: pre treatment   BP Location: Left arm   Patient Position: Sitting   Pulse: 86   Resp: 16   Temp: 97.1 °F (36.2 °C)   TempSrc: Temporal   PainSc: 0-No pain        Procedure   WOUND CARE HYPERBARIC  Performed by: Shea Daniels MD  Authorized by: Shea Daniels MD     Treatment number: 28   Mobility  The following were used for the patient's mobility: ambulatory  The patient appears alert and oriented  The lungs are clear.   TM's:     Right Ear:     -Intact      Left Ear:     -Intact  Pre-treatment Actions   The patient was voided/has a catheter.    Safety   A grounding device was attached.  We identified the patient.  We identified the patient using the following methods: Photo ID, verbally and date of birth.    Treatment Profile: 2.0   The total time of the procedure was 110 minutes.   Pressure    The patient's Leave Surface Rate was 8:49 PSI / minutes. The patient's Reach Bottom was 8:59 minutes. The patient's Leave Bottom Rate was 10:29 PSI / minutes. The patient's Return Surface was 10:39 minutes.   Post-Treatment Vital Signs and Assessment   Lungs:  Clear  Right TMs:  Intact  Left TMs:  Intact           Results for orders placed or performed in visit on 11/09/21   1. POC Glucose (Collected: 11/9/2021 10:54 AM)    Specimen: Blood   Result Value Ref Range    Glucose 153 (H) 70 - 99 mg/dL

## 2021-11-10 ENCOUNTER — PROCEDURE VISIT (OUTPATIENT)
Dept: WOUND CARE | Facility: HOSPITAL | Age: 61
End: 2021-11-10

## 2021-11-10 ENCOUNTER — APPOINTMENT (OUTPATIENT)
Dept: INFUSION THERAPY | Facility: HOSPITAL | Age: 61
End: 2021-11-10

## 2021-11-10 ENCOUNTER — OFFICE VISIT (OUTPATIENT)
Dept: WOUND CARE | Facility: HOSPITAL | Age: 61
End: 2021-11-10

## 2021-11-10 VITALS
OXYGEN SATURATION: 96 % | SYSTOLIC BLOOD PRESSURE: 112 MMHG | DIASTOLIC BLOOD PRESSURE: 66 MMHG | TEMPERATURE: 96.8 F | RESPIRATION RATE: 18 BRPM | HEART RATE: 64 BPM

## 2021-11-10 VITALS
DIASTOLIC BLOOD PRESSURE: 66 MMHG | HEART RATE: 64 BPM | RESPIRATION RATE: 16 BRPM | TEMPERATURE: 97.8 F | SYSTOLIC BLOOD PRESSURE: 112 MMHG

## 2021-11-10 DIAGNOSIS — L97.424 DIABETIC ULCER OF LEFT HEEL ASSOCIATED WITH TYPE 2 DIABETES MELLITUS, WITH NECROSIS OF BONE (HCC): ICD-10-CM

## 2021-11-10 DIAGNOSIS — L97.424 DIABETIC ULCER OF LEFT HEEL ASSOCIATED WITH TYPE 2 DIABETES MELLITUS, WITH NECROSIS OF BONE (HCC): Primary | ICD-10-CM

## 2021-11-10 DIAGNOSIS — E11.621 DIABETIC ULCER OF LEFT HEEL ASSOCIATED WITH TYPE 2 DIABETES MELLITUS, WITH NECROSIS OF BONE (HCC): Primary | ICD-10-CM

## 2021-11-10 DIAGNOSIS — M86.8X7 OTHER OSTEOMYELITIS OF LEFT FOOT (HCC): ICD-10-CM

## 2021-11-10 DIAGNOSIS — E11.621 DIABETIC ULCER OF RIGHT MIDFOOT ASSOCIATED WITH TYPE 2 DIABETES MELLITUS, WITH MUSCLE INVOLVEMENT WITHOUT EVIDENCE OF NECROSIS (HCC): ICD-10-CM

## 2021-11-10 DIAGNOSIS — E11.42 DIABETIC POLYNEUROPATHY ASSOCIATED WITH TYPE 2 DIABETES MELLITUS (HCC): ICD-10-CM

## 2021-11-10 DIAGNOSIS — E11.621 DIABETIC ULCER OF LEFT HEEL ASSOCIATED WITH TYPE 2 DIABETES MELLITUS, WITH NECROSIS OF BONE (HCC): ICD-10-CM

## 2021-11-10 DIAGNOSIS — L97.415 DIABETIC ULCER OF RIGHT MIDFOOT ASSOCIATED WITH TYPE 2 DIABETES MELLITUS, WITH MUSCLE INVOLVEMENT WITHOUT EVIDENCE OF NECROSIS (HCC): ICD-10-CM

## 2021-11-10 LAB
GLUCOSE BLDC GLUCOMTR-MCNC: 128 MG/DL (ref 70–99)
GLUCOSE BLDC GLUCOMTR-MCNC: 140 MG/DL (ref 70–99)

## 2021-11-10 PROCEDURE — 99183 HYPERBARIC OXYGEN THERAPY: CPT | Performed by: EMERGENCY MEDICINE

## 2021-11-10 PROCEDURE — 82962 GLUCOSE BLOOD TEST: CPT | Performed by: EMERGENCY MEDICINE

## 2021-11-10 PROCEDURE — G0277 HBOT, FULL BODY CHAMBER, 30M: HCPCS

## 2021-11-10 PROCEDURE — 11042 DBRDMT SUBQ TIS 1ST 20SQCM/<: CPT | Performed by: EMERGENCY MEDICINE

## 2021-11-10 NOTE — PROGRESS NOTES
Chief Complaint  Wound Check (BILATERAL FOOT ULCERS, DM)    Subjective        Wound Check      Patient is a 60-year-old type II diabetic with neuropathy and insulin dependence.  He has been followed in the wound care clinic for nonhealing ulcers on his left heel and right distal midfoot. MRI showed osteomyelitis of the left heel. He had a PICC line placed 08/20/2021 and is undergoing IV antibiotics daily (ertapenem).  He recently started to undergo hyperbaric oxygen therapy for his osteomyelitis and is tolerating it well.  He is trying to control his sugars better and really watch what he eats.  He says he has a hard time offloading the wounds because he still has to do his ADLs and go to the laundromat etc.    He says he has been continuing to try to manage his dietary intake and eating more plant-based foods and cutting out fried foods and carbs as much as possible to improve his glucose control.  He has recently seen Kami Garcia for his diabetes and says he has already lost quite a bit of weight on his new medication and diet plan, but would like to lose another 50 pounds to start to really feel better he says.    He completed his full course of IV antibiotics and is continuing with HBO.  He has been doing PolyMem silver to both plantar wounds and they seem to be improving.  The pain he was having last week when he would swing his legs over the edge of the bed is now gone.    Allergies:  Adhesive tape      Current Outpatient Medications:   •  alfuzosin (UROXATRAL) 10 MG 24 hr tablet, Take 10 mg by mouth Daily., Disp: , Rfl:   •  apixaban (Eliquis) 5 MG tablet tablet, Take 10 mg by mouth 2 (two) times a day., Disp: , Rfl:   •  buPROPion XL (WELLBUTRIN XL) 300 MG 24 hr tablet, Take 300 mg by mouth 2 (Two) Times a Day., Disp: , Rfl:   •  citalopram (CeleXA) 10 MG tablet, TAKE (1) TABLET BY MOUTH 1 TIME PER DAY AT BEDTIME, Disp: , Rfl:   •  Dulaglutide (Trulicity) 1.5 MG/0.5ML solution pen-injector, Inject 1.5 mg  under the skin into the appropriate area as directed Every 7 (Seven) Days for 4 doses., Disp: 2 mL, Rfl: 3  •  glucose blood test strip, Test blood sugar 3 times a day, Disp: 100 each, Rfl: 5  •  insulin aspart (NovoLOG) 100 UNIT/ML injection, Inject 20 Units under the skin into the appropriate area as directed 3 (Three) Times a Day Before Meals. Take up to 20 units as instructed, Disp: 20 mL, Rfl: 3  •  insulin detemir (LEVEMIR) 100 UNIT/ML injection, Inject 50 Units under the skin into the appropriate area as directed Every Night. Take 40 units and adjust to 50 as instructed, Disp: 20 mL, Rfl: 3  •  lisinopril (PRINIVIL,ZESTRIL) 20 MG tablet, Take 20 mg by mouth Daily., Disp: , Rfl:   •  metFORMIN (GLUCOPHAGE) 1000 MG tablet, Take 1,000 mg by mouth 2 (two) times a day., Disp: , Rfl:   •  metoprolol succinate XL (TOPROL-XL) 50 MG 24 hr tablet, Take 50 mg by mouth Daily., Disp: , Rfl:   •  sertraline (ZOLOFT) 100 MG tablet, Take 100 mg by mouth Daily., Disp: , Rfl:   •  simvastatin (Zocor) 20 MG tablet, Take 20 mg by mouth Every Night., Disp: , Rfl:   •  sulfamethoxazole-trimethoprim (BACTRIM DS,SEPTRA DS) 800-160 MG per tablet, Take 1 tablet by mouth 2 (Two) Times a Day for 10 days., Disp: 20 tablet, Rfl: 0    Past Medical History:   Diagnosis Date   • Absence of toe of right foot    • Acute osteomyelitis of left calcaneus  8/18/2021   • Anxiety and depression    • Arthritis    • Claustrophobia    • Corns and callus    • Diabetic ulcer of left heel associated with type 2 DM 8/18/2021   • Diabetic ulcer of left heel associated with type 2 DM 7/6/2021   • Diabetic ulcer of right midfoot associated with type 2 DM 8/18/2021   • Difficulty walking    • Essential hypertension 8/31/2021   • Hammertoe    • Hyperlipidemia LDL goal <100 8/31/2021   • Ingrown toenail    • Obesity    • Paroxysmal atrial fibrillation 8/31/2021   • Polyneuropathy    • Pressure ulcer, stage 1    • Tinea unguium    • Type 2 diabetes mellitus with  polyneuropathy      Past Surgical History:   Procedure Laterality Date   • CYST REMOVAL      center of back; benign   • INCISION AND DRAINAGE ABSCESS      back   • OTHER SURGICAL HISTORY      Surgical clips left foot   • TOE SURGERY Right     Removal of 5th toe   • WRIST SURGERY Left     repair of injury     Social History     Socioeconomic History   • Marital status: Single   Tobacco Use   • Smoking status: Former Smoker     Packs/day: 1.00     Types: Cigarettes     Quit date: 1991     Years since quittin.2   • Smokeless tobacco: Never Used   Vaping Use   • Vaping Use: Never used   Substance and Sexual Activity   • Alcohol use: Yes     Comment: Rare   • Drug use: Not Currently   • Sexual activity: Defer     Family History   Problem Relation Age of Onset   • Heart disease Mother    • Heart disease Father    • Cancer Father         Unspecified   • Heart disease Brother          Objective     Vitals:    11/10/21 1027   BP: 112/66   BP Location: Left arm   Patient Position: Sitting   Pulse: 64   Resp: 18   Temp: 96.8 °F (36 °C)   TempSrc: Temporal   SpO2: 96%   PainSc: 0-No pain     There is no height or weight on file to calculate BMI.    STEADI Fall Risk Assessment has not been completed.     Review of Systems     ROS:  No fevers, chills, sweats, or body aches. No shortness of breath.     I have reviewed the HPI and ROS as documented by MA/RN. Shea Daniels MD    Physical Exam     NAD, well dressed, well nourished, looks slightly paler than usual today and diminished skin turgor, appears dehydrated  Normocephalic, atraumatic  EOMI, no scleral icterus  No respiratory distress, no cough  AAOx3, pleasant, cooperative  Wounds are about the same today but there is evidence of deep tissue injury along the proximal margin of the right plantar wound.  Recurrent callus forms quickly between visits but is also diminishing slightly.  No erythema, purulence, induration, or fluctuance.  LLE wound on heel remains  tender with pressure but not with sharp stimuli. See photos below for details.                    Result Review :  The following data was reviewed by: Shea Daniels MD on 11/10/2021:    Prior wound photos, prior office notes.  Nursing measurements.      Wound debridement  Performed by: Shea Daniels MD  Authorized by: Shea Daniels MD     Correct patient: Identification verified by two methods:     Verbally and Date of birth  Correct procedure/consent    Correct site/side    Correct equipment as applicable    How many wounds are you performing a debridement on?:  2  Wound 1:     Nursing Documentation:     Wound Location:  Left plantar foot  Measurements:     The total amount debrided on this wound was under 20 cm2.     Provider Documentation    Debridement type:  Sharp/excisional    Betadine      Other:  Sterile 10 blade scalpel     None    Tissue debrided:  Moderate    Level removed:  Subcutaneous    Patient tolerance:  Good    Hemostasis achieved by:  Silver nitrate    Wound Bed Post Debridement: See Photo    Wound 2:     Wound Location:  Right plantar foot   Measurements:    The total amount debrided on this wound was under 20 cm2.      Provider Documentation:     Debridement type:  Sharp/Excisional    Betadine      Other:  Sterile 10 blade scalpel     None    Tissue debrided:  Small    Level removed:  Subcutaneous    Patient tolerance:  Good    Hemostasis achieved by:  Silver Nitrate and Direct Pressure    Wound Bed Post Debridement: See Photo      Description:    Total combined debridement area for both wounds less than 20 cm²      Post debridement photos:                 Assessment and Plan   Diagnoses and all orders for this visit:    1. 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 Other osteomyelitis of left foot (HCC)    3. Diabetic ulcer of right midfoot associated with type 2 diabetes mellitus, with muscle involvement without  evidence of necrosis (HCC)    4. Diabetic polyneuropathy associated with type 2 diabetes mellitus (HCC)    Other orders  -     Wound debridement         Continue full course of hyperbaric oxygen therapy.  PolyMem silver to bilateral wounds every other day.  Wounds are continuing to slowly improve.    Recheck in 1 week.  Again advised to offload the wounds, particularly on the left heel, but the patient says this is hard to do.  Continue weight loss and improved diabetic control.    Patient was given instructions and counseling regarding their condition or for health maintenance advice, as well as the wound care plan and recommendations. They understand and agree with the plan.  They will follow back up here in the clinic but are instructed to contact us in the interim should they have any new, returning, or worsening symptoms or concerns. Please see specific information pulled into the AVS if appropriate.       Follow Up   Return in about 1 week (around 11/17/2021) for Recheck.      Shea Daniels MD

## 2021-11-10 NOTE — PROCEDURES
Vitals:    11/10/21 0845   BP: 102/64  Comment: pre treatment   BP Location: Left arm   Patient Position: Sitting   Pulse: 66   Resp: 16   Temp: 97.6 °F (36.4 °C)   TempSrc: Temporal   PainSc: 0-No pain        Procedure   WOUND CARE HYPERBARIC  Performed by: Shea Daniels MD  Authorized by: Shea Daniels MD     Treatment number: 29   Mobility  The following were used for the patient's mobility: ambulatory  The patient appears alert and oriented  The lungs are clear.   TM's:     Right Ear:     -Intact      Left Ear:     -Intact  Pre-treatment Actions   The patient was voided/has a catheter.    Safety   A grounding device was attached.  The safety checklist was reviewed.   We identified the patient.  We identified the patient using the following methods: Photo ID, verbally and date of birth.    Treatment Profile: 2.0   The total time of the procedure was 66 minutes.   Pressure    The patient's Leave Surface Rate was 9:00 PSI / minutes. The patient's Reach Bottom was 9:10 minutes. The patient's Leave Bottom Rate was 9:56 PSI / minutes. The patient's Return Surface was 10:06 minutes.   Post-Treatment Vital Signs and Assessment   Lungs:  Clear  Right TMs:  Intact  Left TMs:  Intact           Results for orders placed or performed in visit on 11/10/21   1. POC Glucose (Collected: 11/10/2021 10:18 AM)    Specimen: Blood   Result Value Ref Range    Glucose 140 (H) 70 - 99 mg/dL

## 2021-11-11 DIAGNOSIS — L97.424 DIABETIC ULCER OF LEFT HEEL ASSOCIATED WITH TYPE 2 DIABETES MELLITUS, WITH NECROSIS OF BONE (HCC): Primary | ICD-10-CM

## 2021-11-11 DIAGNOSIS — L97.415 DIABETIC ULCER OF RIGHT MIDFOOT ASSOCIATED WITH TYPE 2 DIABETES MELLITUS, WITH MUSCLE INVOLVEMENT WITHOUT EVIDENCE OF NECROSIS (HCC): ICD-10-CM

## 2021-11-11 DIAGNOSIS — E11.621 DIABETIC ULCER OF LEFT HEEL ASSOCIATED WITH TYPE 2 DIABETES MELLITUS, WITH NECROSIS OF BONE (HCC): Primary | ICD-10-CM

## 2021-11-11 DIAGNOSIS — E11.621 DIABETIC ULCER OF RIGHT MIDFOOT ASSOCIATED WITH TYPE 2 DIABETES MELLITUS, WITH MUSCLE INVOLVEMENT WITHOUT EVIDENCE OF NECROSIS (HCC): ICD-10-CM

## 2021-11-12 ENCOUNTER — APPOINTMENT (OUTPATIENT)
Dept: INFUSION THERAPY | Facility: HOSPITAL | Age: 61
End: 2021-11-12

## 2021-11-13 ENCOUNTER — HOSPITAL ENCOUNTER (OUTPATIENT)
Dept: INFUSION THERAPY | Facility: HOSPITAL | Age: 61
Discharge: HOME | End: 2021-11-13
Admitting: EMERGENCY MEDICINE

## 2021-11-13 DIAGNOSIS — L97.415 DIABETIC ULCER OF RIGHT MIDFOOT ASSOCIATED WITH TYPE 2 DIABETES MELLITUS, WITH MUSCLE INVOLVEMENT WITHOUT EVIDENCE OF NECROSIS (HCC): ICD-10-CM

## 2021-11-13 DIAGNOSIS — E11.621 DIABETIC ULCER OF LEFT HEEL ASSOCIATED WITH TYPE 2 DIABETES MELLITUS, WITH NECROSIS OF BONE (HCC): ICD-10-CM

## 2021-11-13 DIAGNOSIS — E11.621 DIABETIC ULCER OF RIGHT MIDFOOT ASSOCIATED WITH TYPE 2 DIABETES MELLITUS, WITH MUSCLE INVOLVEMENT WITHOUT EVIDENCE OF NECROSIS (HCC): ICD-10-CM

## 2021-11-13 DIAGNOSIS — L97.424 DIABETIC ULCER OF LEFT HEEL ASSOCIATED WITH TYPE 2 DIABETES MELLITUS, WITH NECROSIS OF BONE (HCC): ICD-10-CM

## 2021-11-13 DIAGNOSIS — M86.172 ACUTE OSTEOMYELITIS OF LEFT CALCANEUS (HCC): Primary | ICD-10-CM

## 2021-11-13 PROCEDURE — G0463 HOSPITAL OUTPT CLINIC VISIT: HCPCS

## 2021-11-13 NOTE — CODE DOCUMENTATION
PATIENT REFUSES TO WAIT FOR TRANSPORT TO PICK HIM UP FROM REGISTRATION AND BRING TO FLOOR FOR WOUND CARE. PT LEFT WITHOUT BEING TREATED.

## 2021-11-15 ENCOUNTER — PROCEDURE VISIT (OUTPATIENT)
Dept: WOUND CARE | Facility: HOSPITAL | Age: 61
End: 2021-11-15

## 2021-11-15 ENCOUNTER — HOSPITAL ENCOUNTER (OUTPATIENT)
Dept: INFUSION THERAPY | Facility: HOSPITAL | Age: 61
Discharge: HOME | End: 2021-11-15

## 2021-11-15 VITALS
TEMPERATURE: 97.5 F | DIASTOLIC BLOOD PRESSURE: 68 MMHG | RESPIRATION RATE: 20 BRPM | OXYGEN SATURATION: 97 % | WEIGHT: 315 LBS | BODY MASS INDEX: 40.43 KG/M2 | HEART RATE: 63 BPM | HEIGHT: 74 IN | SYSTOLIC BLOOD PRESSURE: 109 MMHG

## 2021-11-15 VITALS
TEMPERATURE: 97.5 F | SYSTOLIC BLOOD PRESSURE: 98 MMHG | DIASTOLIC BLOOD PRESSURE: 66 MMHG | HEART RATE: 55 BPM | RESPIRATION RATE: 18 BRPM

## 2021-11-15 DIAGNOSIS — L97.424 DIABETIC ULCER OF LEFT HEEL ASSOCIATED WITH TYPE 2 DIABETES MELLITUS, WITH NECROSIS OF BONE (HCC): ICD-10-CM

## 2021-11-15 DIAGNOSIS — E11.621 DIABETIC ULCER OF LEFT HEEL ASSOCIATED WITH TYPE 2 DIABETES MELLITUS, WITH NECROSIS OF BONE (HCC): ICD-10-CM

## 2021-11-15 DIAGNOSIS — L97.415 DIABETIC ULCER OF RIGHT MIDFOOT ASSOCIATED WITH TYPE 2 DIABETES MELLITUS, WITH MUSCLE INVOLVEMENT WITHOUT EVIDENCE OF NECROSIS (HCC): ICD-10-CM

## 2021-11-15 DIAGNOSIS — M86.8X7 OTHER OSTEOMYELITIS OF LEFT FOOT (HCC): ICD-10-CM

## 2021-11-15 DIAGNOSIS — E11.621 DIABETIC ULCER OF RIGHT MIDFOOT ASSOCIATED WITH TYPE 2 DIABETES MELLITUS, WITH MUSCLE INVOLVEMENT WITHOUT EVIDENCE OF NECROSIS (HCC): ICD-10-CM

## 2021-11-15 DIAGNOSIS — M86.172 ACUTE OSTEOMYELITIS OF LEFT CALCANEUS (HCC): Primary | ICD-10-CM

## 2021-11-15 LAB — GLUCOSE BLDC GLUCOMTR-MCNC: 134 MG/DL (ref 70–99)

## 2021-11-15 PROCEDURE — G0277 HBOT, FULL BODY CHAMBER, 30M: HCPCS

## 2021-11-15 PROCEDURE — G0463 HOSPITAL OUTPT CLINIC VISIT: HCPCS

## 2021-11-15 PROCEDURE — 99183 HYPERBARIC OXYGEN THERAPY: CPT | Performed by: INTERNAL MEDICINE

## 2021-11-15 PROCEDURE — 82962 GLUCOSE BLOOD TEST: CPT | Performed by: INTERNAL MEDICINE

## 2021-11-15 NOTE — PROCEDURES
Vitals:    11/15/21 0845   BP: 118/66  Comment: pre treatment   BP Location: Left arm   Patient Position: Sitting   Pulse: 53   Resp: 18   Temp: 97.7 °F (36.5 °C)   TempSrc: Temporal   PainSc: 0-No pain        Procedure   WOUND CARE HYPERBARIC  Performed by: Shea Daniels MD  Authorized by: Shea Daniels MD     Treatment number: 30   Mobility  The following were used for the patient's mobility: ambulatory  The patient appears alert and oriented  The lungs are clear.   TM's:     Right Ear:     -Intact      Left Ear:     -Intact  Pre-treatment Actions   The patient was voided/has a catheter.    Safety   A grounding device was attached.  The safety checklist was reviewed.   We identified the patient.  We identified the patient using the following methods: Photo ID, verbally and date of birth.    Treatment Profile: 2.0   The total time of the procedure was 110 minutes.   Pressure    The patient's Leave Surface Rate was 9:07 PSI / minutes. The patient's Reach Bottom was 9:17 minutes. The patient's Leave Bottom Rate was 10:47 PSI / minutes. The patient's Return Surface was 10:57 minutes.   Post-Treatment Vital Signs and Assessment   Lungs:  Clear  Right TMs:  Intact  Left TMs:  Intact           Results for orders placed or performed in visit on 11/15/21   1. POC Glucose (Collected: 11/15/2021 11:13 AM)    Specimen: Blood   Result Value Ref Range    Glucose 134 (H) 70 - 99 mg/dL

## 2021-11-16 ENCOUNTER — APPOINTMENT (OUTPATIENT)
Dept: INFUSION THERAPY | Facility: HOSPITAL | Age: 61
End: 2021-11-16

## 2021-11-17 ENCOUNTER — APPOINTMENT (OUTPATIENT)
Dept: INFUSION THERAPY | Facility: HOSPITAL | Age: 61
End: 2021-11-17

## 2021-11-17 ENCOUNTER — PROCEDURE VISIT (OUTPATIENT)
Dept: WOUND CARE | Facility: HOSPITAL | Age: 61
End: 2021-11-17

## 2021-11-17 ENCOUNTER — OFFICE VISIT (OUTPATIENT)
Dept: WOUND CARE | Facility: HOSPITAL | Age: 61
End: 2021-11-17

## 2021-11-17 VITALS
DIASTOLIC BLOOD PRESSURE: 60 MMHG | HEART RATE: 68 BPM | RESPIRATION RATE: 16 BRPM | SYSTOLIC BLOOD PRESSURE: 102 MMHG | TEMPERATURE: 97.8 F

## 2021-11-17 VITALS
TEMPERATURE: 96.6 F | SYSTOLIC BLOOD PRESSURE: 102 MMHG | RESPIRATION RATE: 18 BRPM | HEART RATE: 73 BPM | DIASTOLIC BLOOD PRESSURE: 64 MMHG

## 2021-11-17 DIAGNOSIS — E11.621 DIABETIC ULCER OF RIGHT MIDFOOT ASSOCIATED WITH TYPE 2 DIABETES MELLITUS, WITH MUSCLE INVOLVEMENT WITHOUT EVIDENCE OF NECROSIS (HCC): ICD-10-CM

## 2021-11-17 DIAGNOSIS — E11.621 DIABETIC ULCER OF LEFT HEEL ASSOCIATED WITH TYPE 2 DIABETES MELLITUS, WITH NECROSIS OF BONE (HCC): Primary | ICD-10-CM

## 2021-11-17 DIAGNOSIS — L97.415 DIABETIC ULCER OF RIGHT MIDFOOT ASSOCIATED WITH TYPE 2 DIABETES MELLITUS, WITH MUSCLE INVOLVEMENT WITHOUT EVIDENCE OF NECROSIS (HCC): ICD-10-CM

## 2021-11-17 DIAGNOSIS — L97.424 DIABETIC ULCER OF LEFT HEEL ASSOCIATED WITH TYPE 2 DIABETES MELLITUS, WITH NECROSIS OF BONE (HCC): Primary | ICD-10-CM

## 2021-11-17 DIAGNOSIS — M86.172 ACUTE OSTEOMYELITIS OF LEFT CALCANEUS (HCC): ICD-10-CM

## 2021-11-17 LAB
GLUCOSE BLDC GLUCOMTR-MCNC: 115 MG/DL (ref 70–99)
GLUCOSE BLDC GLUCOMTR-MCNC: 138 MG/DL (ref 70–99)

## 2021-11-17 PROCEDURE — 99183 HYPERBARIC OXYGEN THERAPY: CPT | Performed by: INTERNAL MEDICINE

## 2021-11-17 PROCEDURE — G0463 HOSPITAL OUTPT CLINIC VISIT: HCPCS | Performed by: INTERNAL MEDICINE

## 2021-11-17 PROCEDURE — 82962 GLUCOSE BLOOD TEST: CPT | Performed by: INTERNAL MEDICINE

## 2021-11-17 PROCEDURE — G0277 HBOT, FULL BODY CHAMBER, 30M: HCPCS

## 2021-11-17 PROCEDURE — 99213 OFFICE O/P EST LOW 20 MIN: CPT | Performed by: INTERNAL MEDICINE

## 2021-11-17 NOTE — SIGNIFICANT NOTE
Epithelial %: %    Exposed Bone: no    Exposed Tendon: no    Impression: unchanged    Short Term Goals: DECREASE IN SIZE, INCREASE GRANULATION

## 2021-11-17 NOTE — SIGNIFICANT NOTE
Epithelial %: 50-75%    Exposed Bone: no    Exposed Tendon: no    Impression: unchanged    Short Term Goals: DECREASE IN SIZE, INCREASE IN GRANULATION

## 2021-11-17 NOTE — PROCEDURES
Vitals:    11/17/21 1300   BP: 102/64  Comment: pre treatment   BP Location: Left arm   Patient Position: Sitting   Pulse: 73   Resp: 16   Temp: 97.3 °F (36.3 °C)   TempSrc: Temporal   PainSc: 0-No pain        Procedure   WOUND CARE HYPERBARIC  Performed by: Carson Bañuelos MD  Authorized by: Carson Bañuelos MD     Treatment number: 31   Mobility  The following were used for the patient's mobility: ambulatory  The patient appears alert and oriented  The lungs are clear.   TM's:     Right Ear:     -Intact      Left Ear:     -Intact  Pre-treatment Actions   The patient was voided/has a catheter.    Safety   A grounding device was attached.  The safety checklist was reviewed.   We identified the patient.  We identified the patient using the following methods: Photo ID, verbally and date of birth.    Treatment Profile: 2.0   The total time of the procedure was 90 minutes.   Pressure    The patient's Leave Surface Rate was 2:30 PSI / minutes. The patient's Reach Bottom was 2:40 minutes. The patient's Leave Bottom Rate was 3:50 PSI / minutes. The patient's Return Surface was 4:00 minutes.   Post-Treatment Vital Signs and Assessment   Lungs:  Clear  Right TMs:  Intact  Left TMs:  Intact           Results for orders placed or performed in visit on 11/17/21   1. POC Glucose (Collected: 11/17/2021  4:03 PM)    Specimen: Blood   Result Value Ref Range    Glucose 115 (H) 70 - 99 mg/dL

## 2021-11-17 NOTE — PROGRESS NOTES
Chief Complaint  diabetic foot ulcer (Patient is here to have diabetic foot ulcers checked)    Subjective        The patient has had a course of antibiotics and is continuing to do hyperbaric treatments until the target numbers reached.  He has noted improvement in the plantar ulcers that he has on each foot.    Objective     Vitals:    11/17/21 1317   BP: 102/64   BP Location: Left arm   Patient Position: Sitting   Cuff Size: Large Adult   Pulse: 73   Resp: 18   Temp: 96.6 °F (35.9 °C)   TempSrc: Temporal   PainSc: 0-No pain         I have reviewed the HPI and ROS as documented by MA/RN. Carson Bañuelos MD    Physical Exam   There is no edema of the feet.  There is approximately 2 cm with ulcerations on each plantar surface.  The base of these is clean and the periphery does not currently need any debridement.  Wound Description:        Result Review :   The following data was reviewed by: Carson Bañuelos MD on 11/17/2021:               Assessment and Plan    Diagnoses and all orders for this visit:    1. Diabetic ulcer of left heel associated with type 2 DM (Primary)    2. Diabetic ulcer of right midfoot associated with type 2 DM      The patient has previously finished a course of antibiotics for osteomyelitis in the right and the left heel.  He is currently finishing a course of hyperbaric treatment.  He is using PolyMem dressing on the ulcer surface.  He should continue this and be checked again in 1 week.      Follow Up   No follow-ups on file.  Patient was given instructions and counseling regarding his condition or for health maintenance advice. Please see specific information pulled into the AVS if appropriate.

## 2021-11-18 ENCOUNTER — PROCEDURE VISIT (OUTPATIENT)
Dept: WOUND CARE | Facility: HOSPITAL | Age: 61
End: 2021-11-18

## 2021-11-18 ENCOUNTER — APPOINTMENT (OUTPATIENT)
Dept: INFUSION THERAPY | Facility: HOSPITAL | Age: 61
End: 2021-11-18

## 2021-11-18 VITALS
TEMPERATURE: 97.4 F | HEART RATE: 64 BPM | DIASTOLIC BLOOD PRESSURE: 66 MMHG | SYSTOLIC BLOOD PRESSURE: 102 MMHG | RESPIRATION RATE: 16 BRPM

## 2021-11-18 DIAGNOSIS — E11.621 DIABETIC ULCER OF LEFT HEEL ASSOCIATED WITH TYPE 2 DIABETES MELLITUS, WITH NECROSIS OF BONE (HCC): ICD-10-CM

## 2021-11-18 DIAGNOSIS — M86.8X7 OTHER OSTEOMYELITIS OF LEFT FOOT (HCC): ICD-10-CM

## 2021-11-18 DIAGNOSIS — L97.424 DIABETIC ULCER OF LEFT HEEL ASSOCIATED WITH TYPE 2 DIABETES MELLITUS, WITH NECROSIS OF BONE (HCC): ICD-10-CM

## 2021-11-18 LAB
GLUCOSE BLDC GLUCOMTR-MCNC: 121 MG/DL (ref 70–99)
GLUCOSE BLDC GLUCOMTR-MCNC: 138 MG/DL (ref 70–99)

## 2021-11-18 PROCEDURE — 99183 HYPERBARIC OXYGEN THERAPY: CPT | Performed by: INTERNAL MEDICINE

## 2021-11-18 PROCEDURE — 82962 GLUCOSE BLOOD TEST: CPT | Performed by: INTERNAL MEDICINE

## 2021-11-18 PROCEDURE — G0277 HBOT, FULL BODY CHAMBER, 30M: HCPCS

## 2021-11-18 NOTE — PROCEDURES
Vitals:    11/18/21 0745   BP: 96/60  Comment: pre treatment   BP Location: Left arm   Patient Position: Sitting   Pulse: 72   Resp: 16   Temp: 97.4 °F (36.3 °C)   TempSrc: Temporal   PainSc: 0-No pain        Procedure   WOUND CARE HYPERBARIC  Performed by: Carson Bañuelos MD  Authorized by: Carson Bañuelos MD     Treatment number: 32   Mobility  The following were used for the patient's mobility: ambulatory  The patient appears alert and oriented  The lungs are clear.   TM's:     Right Ear:     -Intact      -Bubbles      Left Ear:     -Intact  Pre-treatment Actions   The patient was voided/has a catheter.    Safety   A grounding device was attached.  The safety checklist was reviewed.   We identified the patient.  We identified the patient using the following methods: Photo ID, verbally and date of birth.    Treatment Profile: 2.0   The total time of the procedure was 110 minutes.   Pressure    The patient's Leave Surface Rate was 8:04 PSI / minutes. The patient's Reach Bottom was 8:14 minutes. The patient's Leave Bottom Rate was 9:44 PSI / minutes. The patient's Return Surface was 9:54 minutes.   Post-Treatment Vital Signs and Assessment   Lungs:  Clear  Right TMs:  Intact  Left TMs:  Intact           Results for orders placed or performed in visit on 11/18/21   1. POC Glucose (Collected: 11/18/2021 10:12 AM)    Specimen: Blood   Result Value Ref Range    Glucose 138 (H) 70 - 99 mg/dL

## 2021-11-19 ENCOUNTER — HOSPITAL ENCOUNTER (OUTPATIENT)
Dept: INFUSION THERAPY | Facility: HOSPITAL | Age: 61
Setting detail: INFUSION SERIES
Discharge: HOME | End: 2021-11-19

## 2021-11-19 VITALS
RESPIRATION RATE: 20 BRPM | SYSTOLIC BLOOD PRESSURE: 128 MMHG | DIASTOLIC BLOOD PRESSURE: 74 MMHG | TEMPERATURE: 98.4 F | HEART RATE: 69 BPM | OXYGEN SATURATION: 99 %

## 2021-11-19 DIAGNOSIS — E11.621 DIABETIC ULCER OF RIGHT MIDFOOT ASSOCIATED WITH TYPE 2 DIABETES MELLITUS, WITH MUSCLE INVOLVEMENT WITHOUT EVIDENCE OF NECROSIS (HCC): ICD-10-CM

## 2021-11-19 DIAGNOSIS — L97.415 DIABETIC ULCER OF RIGHT MIDFOOT ASSOCIATED WITH TYPE 2 DIABETES MELLITUS, WITH MUSCLE INVOLVEMENT WITHOUT EVIDENCE OF NECROSIS (HCC): ICD-10-CM

## 2021-11-19 DIAGNOSIS — E11.621 DIABETIC ULCER OF LEFT HEEL ASSOCIATED WITH TYPE 2 DIABETES MELLITUS, WITH NECROSIS OF BONE (HCC): ICD-10-CM

## 2021-11-19 DIAGNOSIS — L97.424 DIABETIC ULCER OF LEFT HEEL ASSOCIATED WITH TYPE 2 DIABETES MELLITUS, WITH NECROSIS OF BONE (HCC): ICD-10-CM

## 2021-11-19 DIAGNOSIS — M86.172 ACUTE OSTEOMYELITIS OF LEFT CALCANEUS (HCC): Primary | ICD-10-CM

## 2021-11-19 PROCEDURE — G0463 HOSPITAL OUTPT CLINIC VISIT: HCPCS

## 2021-11-20 ENCOUNTER — APPOINTMENT (OUTPATIENT)
Dept: INFUSION THERAPY | Facility: HOSPITAL | Age: 61
End: 2021-11-20

## 2021-11-22 ENCOUNTER — APPOINTMENT (OUTPATIENT)
Dept: INFUSION THERAPY | Facility: HOSPITAL | Age: 61
End: 2021-11-22

## 2021-11-22 ENCOUNTER — APPOINTMENT (OUTPATIENT)
Dept: WOUND CARE | Facility: HOSPITAL | Age: 61
End: 2021-11-22

## 2021-11-22 ENCOUNTER — OFFICE VISIT (OUTPATIENT)
Dept: WOUND CARE | Facility: HOSPITAL | Age: 61
End: 2021-11-22

## 2021-11-22 VITALS
RESPIRATION RATE: 16 BRPM | HEART RATE: 79 BPM | DIASTOLIC BLOOD PRESSURE: 72 MMHG | SYSTOLIC BLOOD PRESSURE: 112 MMHG | BODY MASS INDEX: 40.43 KG/M2 | TEMPERATURE: 96.9 F | HEIGHT: 74 IN | WEIGHT: 315 LBS

## 2021-11-22 DIAGNOSIS — L03.115 CELLULITIS OF RIGHT FOOT: ICD-10-CM

## 2021-11-22 DIAGNOSIS — L08.9 TRAUMATIC HEMATOMA OF RIGHT LOWER LEG WITH INFECTION, INITIAL ENCOUNTER: ICD-10-CM

## 2021-11-22 DIAGNOSIS — L97.415 DIABETIC ULCER OF RIGHT MIDFOOT ASSOCIATED WITH TYPE 2 DIABETES MELLITUS, WITH MUSCLE INVOLVEMENT WITHOUT EVIDENCE OF NECROSIS (HCC): ICD-10-CM

## 2021-11-22 DIAGNOSIS — E11.621 DIABETIC ULCER OF RIGHT MIDFOOT ASSOCIATED WITH TYPE 2 DIABETES MELLITUS, WITH MUSCLE INVOLVEMENT WITHOUT EVIDENCE OF NECROSIS (HCC): ICD-10-CM

## 2021-11-22 DIAGNOSIS — E11.621 DIABETIC ULCER OF LEFT HEEL ASSOCIATED WITH TYPE 2 DIABETES MELLITUS, WITH NECROSIS OF BONE (HCC): Primary | ICD-10-CM

## 2021-11-22 DIAGNOSIS — M86.8X7 OTHER OSTEOMYELITIS OF LEFT FOOT (HCC): ICD-10-CM

## 2021-11-22 DIAGNOSIS — L97.424 DIABETIC ULCER OF LEFT HEEL ASSOCIATED WITH TYPE 2 DIABETES MELLITUS, WITH NECROSIS OF BONE (HCC): Primary | ICD-10-CM

## 2021-11-22 DIAGNOSIS — S80.11XA TRAUMATIC HEMATOMA OF RIGHT LOWER LEG WITH INFECTION, INITIAL ENCOUNTER: ICD-10-CM

## 2021-11-22 PROCEDURE — 99214 OFFICE O/P EST MOD 30 MIN: CPT | Performed by: EMERGENCY MEDICINE

## 2021-11-22 PROCEDURE — 87186 SC STD MICRODIL/AGAR DIL: CPT | Performed by: EMERGENCY MEDICINE

## 2021-11-22 PROCEDURE — 11042 DBRDMT SUBQ TIS 1ST 20SQCM/<: CPT | Performed by: EMERGENCY MEDICINE

## 2021-11-22 PROCEDURE — 10060 I&D ABSCESS SIMPLE/SINGLE: CPT | Performed by: EMERGENCY MEDICINE

## 2021-11-22 PROCEDURE — 87205 SMEAR GRAM STAIN: CPT | Performed by: EMERGENCY MEDICINE

## 2021-11-22 PROCEDURE — 87147 CULTURE TYPE IMMUNOLOGIC: CPT | Performed by: EMERGENCY MEDICINE

## 2021-11-22 PROCEDURE — 87070 CULTURE OTHR SPECIMN AEROBIC: CPT | Performed by: EMERGENCY MEDICINE

## 2021-11-22 RX ORDER — DOXYCYCLINE HYCLATE 100 MG/1
100 CAPSULE ORAL 2 TIMES DAILY
Qty: 20 CAPSULE | Refills: 0 | Status: ON HOLD | OUTPATIENT
Start: 2021-11-22 | End: 2021-12-01

## 2021-11-22 NOTE — SIGNIFICANT NOTE
Epithelial %: 0%    Exposed Bone: no    Exposed Tendon: no    Impression:  NEW    Short Term Goals: MAINTAIN SKIN INTEGRITY

## 2021-11-22 NOTE — PROGRESS NOTES
"Chief Complaint  Wound Check (RIGHT FOOT AND LEG PAIN X4 DAYS (BS: 200))    Subjective        Wound Check      Patient is a 60-year-old type II diabetic with neuropathy and insulin dependence.  He has been followed in the wound care clinic for nonhealing ulcers on his left heel and right distal midfoot. MRI showed osteomyelitis of the left heel. He had a PICC line placed 08/20/2021 and is undergoing IV antibiotics daily (ertapenem).  He recently started to undergo hyperbaric oxygen therapy for his osteomyelitis and is tolerating it well.  He is trying to control his sugars better and really watch what he eats.  He says he has a hard time offloading the wounds because he still has to do his ADLs and go to the laundromat etc.    He says he has been continuing to try to manage his dietary intake and eating more plant-based foods and cutting out fried foods and carbs as much as possible to improve his glucose control.  He has recently seen Kami Garcia for his diabetes and says he has already lost quite a bit of weight on his new medication and diet plan, but would like to lose another 50 pounds to start to really feel better he says.    He completed his full course of IV antibiotics and is continuing with HBO.  He has been doing PolyMem silver to both plantar wounds and they seem to be improving.  The pain he was having last week when he would swing his legs over the edge of the bed has returned.    He now also has swelling, redness, and burning on the top of his right foot and swelling of the lateralmost right toe.  He says he did trip and banging this foot but did not notice any injury at the time.  He says he \"has no bones in those toes to break\" and that they are held together by metal rods after prior amputations.    Allergies:  Adhesive tape      Current Outpatient Medications:   •  alfuzosin (UROXATRAL) 10 MG 24 hr tablet, Take 10 mg by mouth Daily., Disp: , Rfl:   •  apixaban (Eliquis) 5 MG tablet tablet, Take " 10 mg by mouth 2 (two) times a day., Disp: , Rfl:   •  buPROPion XL (WELLBUTRIN XL) 300 MG 24 hr tablet, Take 300 mg by mouth 2 (Two) Times a Day., Disp: , Rfl:   •  citalopram (CeleXA) 10 MG tablet, TAKE (1) TABLET BY MOUTH 1 TIME PER DAY AT BEDTIME, Disp: , Rfl:   •  doxycycline (VIBRAMYCIN) 100 MG capsule, Take 1 capsule by mouth 2 (Two) Times a Day for 10 days., Disp: 20 capsule, Rfl: 0  •  Dulaglutide (Trulicity) 1.5 MG/0.5ML solution pen-injector, Inject 1.5 mg under the skin into the appropriate area as directed Every 7 (Seven) Days for 4 doses., Disp: 2 mL, Rfl: 3  •  glucose blood test strip, Test blood sugar 3 times a day, Disp: 100 each, Rfl: 5  •  insulin aspart (NovoLOG) 100 UNIT/ML injection, Inject 20 Units under the skin into the appropriate area as directed 3 (Three) Times a Day Before Meals. Take up to 20 units as instructed, Disp: 20 mL, Rfl: 3  •  insulin detemir (LEVEMIR) 100 UNIT/ML injection, Inject 50 Units under the skin into the appropriate area as directed Every Night. Take 40 units and adjust to 50 as instructed, Disp: 20 mL, Rfl: 3  •  lisinopril (PRINIVIL,ZESTRIL) 20 MG tablet, Take 20 mg by mouth Daily., Disp: , Rfl:   •  metFORMIN (GLUCOPHAGE) 1000 MG tablet, Take 1,000 mg by mouth 2 (two) times a day., Disp: , Rfl:   •  metoprolol succinate XL (TOPROL-XL) 50 MG 24 hr tablet, Take 50 mg by mouth Daily., Disp: , Rfl:   •  sertraline (ZOLOFT) 100 MG tablet, Take 100 mg by mouth Daily., Disp: , Rfl:   •  simvastatin (Zocor) 20 MG tablet, Take 20 mg by mouth Every Night., Disp: , Rfl:     Past Medical History:   Diagnosis Date   • Absence of toe of right foot    • Acute osteomyelitis of left calcaneus  8/18/2021   • Anxiety and depression    • Arthritis    • Claustrophobia    • Corns and callus    • Diabetic ulcer of left heel associated with type 2 DM 8/18/2021   • Diabetic ulcer of left heel associated with type 2 DM 7/6/2021   • Diabetic ulcer of right midfoot associated with type 2 DM  "2021   • Difficulty walking    • Essential hypertension 2021   • Hammertoe    • Hyperlipidemia LDL goal <100 2021   • Ingrown toenail    • Obesity    • Paroxysmal atrial fibrillation 2021   • Polyneuropathy    • Pressure ulcer, stage 1    • Tinea unguium    • Type 2 diabetes mellitus with polyneuropathy      Past Surgical History:   Procedure Laterality Date   • CYST REMOVAL      center of back; benign   • INCISION AND DRAINAGE ABSCESS      back   • OTHER SURGICAL HISTORY      Surgical clips left foot   • TOE SURGERY Right     Removal of 5th toe   • WRIST SURGERY Left     repair of injury     Social History     Socioeconomic History   • Marital status: Single   Tobacco Use   • Smoking status: Former Smoker     Packs/day: 1.00     Types: Cigarettes     Quit date: 1991     Years since quittin.2   • Smokeless tobacco: Never Used   Vaping Use   • Vaping Use: Never used   Substance and Sexual Activity   • Alcohol use: Yes     Comment: Rare   • Drug use: Not Currently   • Sexual activity: Defer     Family History   Problem Relation Age of Onset   • Heart disease Mother    • Heart disease Father    • Cancer Father         Unspecified   • Heart disease Brother          Objective     Vitals:    21 0839   BP: 112/72   BP Location: Left arm   Patient Position: Sitting   Pulse: 79   Resp: 16   Temp: 96.9 °F (36.1 °C)   TempSrc: Temporal   Weight: (!) 157 kg (346 lb)   Height: 188 cm (74\")   PainSc: 10-Worst pain ever     Body mass index is 44.42 kg/m².    STEADI Fall Risk Assessment has not been completed.     Review of Systems     ROS:  No fevers, chills, sweats, or body aches.     I have reviewed the HPI and ROS as documented by MA/RN. Shea Daniels MD    Physical Exam     NAD, well dressed, well nourished, looks slightly paler than usual today and diminished skin turgor, appears dehydrated  Normocephalic, atraumatic  EOMI, no scleral icterus  No respiratory distress, no cough  AAOx3, " pleasant, cooperative  Bilateral plantar wounds appear slightly improved.  Persistent recurrent callus again noted.  No erythema, purulence, induration, or fluctuance plantar wounds.  LLE wound on heel remains tender with pressure but not with sharp stimuli.   Right fourth toe with swelling, erythema, fluctuance, and focal area of dark discoloration laterally.  Erythema and warmth extending onto dorsum of right foot.    See photos below for details.                        Result Review :  The following data was reviewed by: Shea Daniels MD on 11/22/2021:    Prior wound photos, prior office notes.  Nursing measurements.      Wound debridement  Performed by: Shea Daniels MD  Authorized by: Shea Daniels MD     Correct patient: Identification verified by two methods:     Verbally and Date of birth  Correct procedure/consent    Correct site/side    Correct equipment as applicable    How many wounds are you performing a debridement on?:  3  Wound 1:     Nursing Documentation:     Wound Location:  Plantar left foot  Measurements:     The total amount debrided on this wound was under 20 cm2.     Provider Documentation    Debridement type:  Sharp/excisional    Betadine      Other:  Sterile 10 blade scalpel     None    Tissue debrided:  Small    Level removed:  Subcutaneous    Patient tolerance:  Good    Hemostasis achieved by:  Direct pressure    Wound Bed Post Debridement: See Photo    Wound 2:     Wound Location:  Plantar right foot   Measurements:    The total amount debrided on this wound was under 20 cm2.      Provider Documentation:     Debridement type:  Sharp/Excisional    Betadine      Other:  Sterile 10 blade scalpel     None    Tissue debrided:  Small    Level removed:  Subcutaneous    Patient tolerance:  Good    Hemostasis achieved by:  Direct Pressure    Wound Bed Post Debridement: See Photo      Description:  Total combined subcutaneous debridement area for both wounds less than 20 cm²  Wound 3:     Nursing Documentation:     Wound Location:  Lateral right fourth toe   Measurements:    The total amount debrided on this wound was under 20 cm2.     Provider Documentation:     Debridement type:  Sharp/Excisional    Betadine      Other:  Sterile 15 blade scalpel     None    Tissue debrided:  Moderate    Type tissue:  Culture sent    Patient tolerance:  Good    Hemostasis achieved by:  Direct Pressure    Wound Bed Post Debridement: See Photo      Description:  Infected hematoma I&D with large number of clots and small amount of purulent drainage expressed.  Culture sent.  Significant improvement of swelling and discoloration of toe.      Post debridement photos:                             Assessment and Plan   Diagnoses and all orders for this visit:    1. Diabetic ulcer of left heel associated with type 2 diabetes mellitus, with necrosis of bone (HCC) (Primary)  -     Wound debridement    2. Other osteomyelitis of left foot (HCC)    3. Diabetic ulcer of right midfoot associated with type 2 DM  -     Wound debridement    4. Traumatic hematoma of right lower leg with infection, initial encounter  -     doxycycline (VIBRAMYCIN) 100 MG capsule; Take 1 capsule by mouth 2 (Two) Times a Day for 10 days.  Dispense: 20 capsule; Refill: 0  -     Wound Culture - Wound, Toe, Right  -     Wound debridement    5. Cellulitis of right foot  -     doxycycline (VIBRAMYCIN) 100 MG capsule; Take 1 capsule by mouth 2 (Two) Times a Day for 10 days.  Dispense: 20 capsule; Refill: 0  -     Wound Culture - Wound, Toe, Right         Continue full course of hyperbaric oxygen therapy.  PolyMem silver to bilateral plantar wounds every other day.  Wounds are continuing to slowly improve.  Doxycycline for infected hematoma.  Gauze dressings fourth toe.    Recheck in 1 week.  Again advised to offload the wounds, particularly on the left heel, but the patient says this is hard to do.  Continue weight loss and improved diabetic control.    Patient  was given instructions and counseling regarding their condition or for health maintenance advice, as well as the wound care plan and recommendations. They understand and agree with the plan.  They will follow back up here in the clinic but are instructed to contact us in the interim should they have any new, returning, or worsening symptoms or concerns. Please see specific information pulled into the AVS if appropriate.       Follow Up   Return in about 1 week (around 11/29/2021) for Recheck.      Shea Daniels MD

## 2021-11-23 ENCOUNTER — APPOINTMENT (OUTPATIENT)
Dept: INFUSION THERAPY | Facility: HOSPITAL | Age: 61
End: 2021-11-23

## 2021-11-24 ENCOUNTER — APPOINTMENT (OUTPATIENT)
Dept: INFUSION THERAPY | Facility: HOSPITAL | Age: 61
End: 2021-11-24

## 2021-11-24 ENCOUNTER — HOSPITAL ENCOUNTER (OUTPATIENT)
Dept: INFUSION THERAPY | Facility: HOSPITAL | Age: 61
Setting detail: INFUSION SERIES
Discharge: HOME | End: 2021-11-24

## 2021-11-24 VITALS
DIASTOLIC BLOOD PRESSURE: 67 MMHG | RESPIRATION RATE: 20 BRPM | OXYGEN SATURATION: 98 % | TEMPERATURE: 97.9 F | SYSTOLIC BLOOD PRESSURE: 113 MMHG | HEART RATE: 89 BPM

## 2021-11-24 DIAGNOSIS — M86.172 ACUTE OSTEOMYELITIS OF LEFT CALCANEUS (HCC): Primary | ICD-10-CM

## 2021-11-24 DIAGNOSIS — E11.621 DIABETIC ULCER OF RIGHT MIDFOOT ASSOCIATED WITH TYPE 2 DIABETES MELLITUS, WITH MUSCLE INVOLVEMENT WITHOUT EVIDENCE OF NECROSIS (HCC): ICD-10-CM

## 2021-11-24 DIAGNOSIS — L97.424 DIABETIC ULCER OF LEFT HEEL ASSOCIATED WITH TYPE 2 DIABETES MELLITUS, WITH NECROSIS OF BONE (HCC): ICD-10-CM

## 2021-11-24 DIAGNOSIS — L97.415 DIABETIC ULCER OF RIGHT MIDFOOT ASSOCIATED WITH TYPE 2 DIABETES MELLITUS, WITH MUSCLE INVOLVEMENT WITHOUT EVIDENCE OF NECROSIS (HCC): ICD-10-CM

## 2021-11-24 DIAGNOSIS — E11.621 DIABETIC ULCER OF LEFT HEEL ASSOCIATED WITH TYPE 2 DIABETES MELLITUS, WITH NECROSIS OF BONE (HCC): ICD-10-CM

## 2021-11-24 PROCEDURE — G0463 HOSPITAL OUTPT CLINIC VISIT: HCPCS

## 2021-11-25 ENCOUNTER — APPOINTMENT (OUTPATIENT)
Dept: INFUSION THERAPY | Facility: HOSPITAL | Age: 61
End: 2021-11-25

## 2021-11-25 LAB
BACTERIA SPEC AEROBE CULT: ABNORMAL
GRAM STN SPEC: ABNORMAL
GRAM STN SPEC: ABNORMAL

## 2021-11-26 ENCOUNTER — HOSPITAL ENCOUNTER (OUTPATIENT)
Dept: INFUSION THERAPY | Facility: HOSPITAL | Age: 61
Setting detail: INFUSION SERIES
Discharge: HOME | End: 2021-11-26

## 2021-11-26 ENCOUNTER — APPOINTMENT (OUTPATIENT)
Dept: INFUSION THERAPY | Facility: HOSPITAL | Age: 61
End: 2021-11-26

## 2021-11-26 VITALS
DIASTOLIC BLOOD PRESSURE: 69 MMHG | TEMPERATURE: 97.3 F | OXYGEN SATURATION: 97 % | HEART RATE: 71 BPM | RESPIRATION RATE: 20 BRPM | SYSTOLIC BLOOD PRESSURE: 109 MMHG

## 2021-11-26 DIAGNOSIS — L97.424 DIABETIC ULCER OF LEFT HEEL ASSOCIATED WITH TYPE 2 DIABETES MELLITUS, WITH NECROSIS OF BONE (HCC): ICD-10-CM

## 2021-11-26 DIAGNOSIS — M86.172 ACUTE OSTEOMYELITIS OF LEFT CALCANEUS (HCC): Primary | ICD-10-CM

## 2021-11-26 DIAGNOSIS — E11.621 DIABETIC ULCER OF RIGHT MIDFOOT ASSOCIATED WITH TYPE 2 DIABETES MELLITUS, WITH MUSCLE INVOLVEMENT WITHOUT EVIDENCE OF NECROSIS (HCC): ICD-10-CM

## 2021-11-26 DIAGNOSIS — L97.415 DIABETIC ULCER OF RIGHT MIDFOOT ASSOCIATED WITH TYPE 2 DIABETES MELLITUS, WITH MUSCLE INVOLVEMENT WITHOUT EVIDENCE OF NECROSIS (HCC): ICD-10-CM

## 2021-11-26 DIAGNOSIS — E11.621 DIABETIC ULCER OF LEFT HEEL ASSOCIATED WITH TYPE 2 DIABETES MELLITUS, WITH NECROSIS OF BONE (HCC): ICD-10-CM

## 2021-11-26 PROCEDURE — G0463 HOSPITAL OUTPT CLINIC VISIT: HCPCS

## 2021-11-27 ENCOUNTER — APPOINTMENT (OUTPATIENT)
Dept: INFUSION THERAPY | Facility: HOSPITAL | Age: 61
End: 2021-11-27

## 2021-11-29 ENCOUNTER — APPOINTMENT (OUTPATIENT)
Dept: INFUSION THERAPY | Facility: HOSPITAL | Age: 61
End: 2021-11-29

## 2021-11-30 ENCOUNTER — APPOINTMENT (OUTPATIENT)
Dept: INFUSION THERAPY | Facility: HOSPITAL | Age: 61
End: 2021-11-30

## 2021-12-01 ENCOUNTER — HOSPITAL ENCOUNTER (INPATIENT)
Facility: HOSPITAL | Age: 61
LOS: 20 days | Discharge: NURSING, REHAB OR LONG-TERM CARE | End: 2021-12-21
Attending: INTERNAL MEDICINE | Admitting: INTERNAL MEDICINE

## 2021-12-01 ENCOUNTER — OFFICE VISIT (OUTPATIENT)
Dept: WOUND CARE | Facility: HOSPITAL | Age: 61
End: 2021-12-01

## 2021-12-01 ENCOUNTER — APPOINTMENT (OUTPATIENT)
Dept: GENERAL RADIOLOGY | Facility: HOSPITAL | Age: 61
End: 2021-12-01

## 2021-12-01 ENCOUNTER — PREP FOR SURGERY (OUTPATIENT)
Dept: OTHER | Facility: HOSPITAL | Age: 61
End: 2021-12-01

## 2021-12-01 ENCOUNTER — APPOINTMENT (OUTPATIENT)
Dept: CARDIOLOGY | Facility: HOSPITAL | Age: 61
End: 2021-12-01

## 2021-12-01 ENCOUNTER — PROCEDURE VISIT (OUTPATIENT)
Dept: WOUND CARE | Facility: HOSPITAL | Age: 61
End: 2021-12-01

## 2021-12-01 VITALS
SYSTOLIC BLOOD PRESSURE: 110 MMHG | TEMPERATURE: 97.1 F | HEIGHT: 74 IN | DIASTOLIC BLOOD PRESSURE: 68 MMHG | BODY MASS INDEX: 40.43 KG/M2 | RESPIRATION RATE: 18 BRPM | WEIGHT: 315 LBS | HEART RATE: 74 BPM

## 2021-12-01 DIAGNOSIS — E11.621 DIABETIC ULCER OF LEFT HEEL ASSOCIATED WITH TYPE 2 DIABETES MELLITUS, WITH NECROSIS OF BONE (HCC): ICD-10-CM

## 2021-12-01 DIAGNOSIS — L02.619 CELLULITIS AND ABSCESS OF FOOT: ICD-10-CM

## 2021-12-01 DIAGNOSIS — E11.42 DIABETIC POLYNEUROPATHY ASSOCIATED WITH TYPE 2 DIABETES MELLITUS (HCC): ICD-10-CM

## 2021-12-01 DIAGNOSIS — M86.8X7 OTHER OSTEOMYELITIS OF LEFT FOOT (HCC): ICD-10-CM

## 2021-12-01 DIAGNOSIS — L97.415 DIABETIC ULCER OF RIGHT MIDFOOT ASSOCIATED WITH TYPE 2 DIABETES MELLITUS, WITH MUSCLE INVOLVEMENT WITHOUT EVIDENCE OF NECROSIS (HCC): Primary | ICD-10-CM

## 2021-12-01 DIAGNOSIS — E11.621 DIABETIC ULCER OF RIGHT MIDFOOT ASSOCIATED WITH TYPE 2 DIABETES MELLITUS, WITH MUSCLE INVOLVEMENT WITHOUT EVIDENCE OF NECROSIS (HCC): Primary | ICD-10-CM

## 2021-12-01 DIAGNOSIS — L97.415 DIABETIC ULCER OF RIGHT MIDFOOT ASSOCIATED WITH TYPE 2 DIABETES MELLITUS, WITH MUSCLE INVOLVEMENT WITHOUT EVIDENCE OF NECROSIS (HCC): ICD-10-CM

## 2021-12-01 DIAGNOSIS — L03.115 CELLULITIS OF RIGHT FOOT: ICD-10-CM

## 2021-12-01 DIAGNOSIS — R26.2 DIFFICULTY WALKING: ICD-10-CM

## 2021-12-01 DIAGNOSIS — S80.11XD TRAUMATIC HEMATOMA OF RIGHT LOWER LEG WITH INFECTION, SUBSEQUENT ENCOUNTER: Primary | ICD-10-CM

## 2021-12-01 DIAGNOSIS — E11.621 DIABETIC ULCER OF RIGHT MIDFOOT ASSOCIATED WITH TYPE 2 DIABETES MELLITUS, WITH MUSCLE INVOLVEMENT WITHOUT EVIDENCE OF NECROSIS (HCC): ICD-10-CM

## 2021-12-01 DIAGNOSIS — L97.424 DIABETIC ULCER OF LEFT HEEL ASSOCIATED WITH TYPE 2 DIABETES MELLITUS, WITH NECROSIS OF BONE (HCC): ICD-10-CM

## 2021-12-01 DIAGNOSIS — L97.424 DIABETIC ULCER OF LEFT HEEL ASSOCIATED WITH TYPE 2 DIABETES MELLITUS, WITH NECROSIS OF BONE (HCC): Primary | ICD-10-CM

## 2021-12-01 DIAGNOSIS — L03.119 CELLULITIS AND ABSCESS OF FOOT: ICD-10-CM

## 2021-12-01 DIAGNOSIS — Z78.9 FAILURE OF OUTPATIENT TREATMENT: ICD-10-CM

## 2021-12-01 DIAGNOSIS — E11.621 DIABETIC ULCER OF LEFT HEEL ASSOCIATED WITH TYPE 2 DIABETES MELLITUS, WITH NECROSIS OF BONE (HCC): Primary | ICD-10-CM

## 2021-12-01 DIAGNOSIS — L08.9 TRAUMATIC HEMATOMA OF RIGHT LOWER LEG WITH INFECTION, SUBSEQUENT ENCOUNTER: Primary | ICD-10-CM

## 2021-12-01 PROBLEM — E87.20 LACTIC ACIDOSIS: Status: ACTIVE | Noted: 2021-12-01

## 2021-12-01 LAB
ALBUMIN SERPL-MCNC: 3.6 G/DL (ref 3.5–5.2)
ALBUMIN/GLOB SERPL: 1.1 G/DL
ALP SERPL-CCNC: 197 U/L (ref 39–117)
ALT SERPL W P-5'-P-CCNC: 41 U/L (ref 1–41)
ANION GAP SERPL CALCULATED.3IONS-SCNC: 9.1 MMOL/L (ref 5–15)
AST SERPL-CCNC: 48 U/L (ref 1–40)
BASOPHILS # BLD AUTO: 0.02 10*3/MM3 (ref 0–0.2)
BASOPHILS NFR BLD AUTO: 0.3 % (ref 0–1.5)
BILIRUB SERPL-MCNC: 0.8 MG/DL (ref 0–1.2)
BUN SERPL-MCNC: 9 MG/DL (ref 8–23)
BUN/CREAT SERPL: 13 (ref 7–25)
CALCIUM SPEC-SCNC: 9.1 MG/DL (ref 8.6–10.5)
CHLORIDE SERPL-SCNC: 101 MMOL/L (ref 98–107)
CO2 SERPL-SCNC: 23.9 MMOL/L (ref 22–29)
CREAT SERPL-MCNC: 0.69 MG/DL (ref 0.76–1.27)
D-LACTATE SERPL-SCNC: 2.2 MMOL/L (ref 0.5–2)
D-LACTATE SERPL-SCNC: 3 MMOL/L (ref 0.5–2)
DEPRECATED RDW RBC AUTO: 46 FL (ref 37–54)
EOSINOPHIL # BLD AUTO: 0.06 10*3/MM3 (ref 0–0.4)
EOSINOPHIL NFR BLD AUTO: 0.8 % (ref 0.3–6.2)
ERYTHROCYTE [DISTWIDTH] IN BLOOD BY AUTOMATED COUNT: 14.7 % (ref 12.3–15.4)
GFR SERPL CREATININE-BSD FRML MDRD: 116 ML/MIN/1.73
GLOBULIN UR ELPH-MCNC: 3.3 GM/DL
GLUCOSE BLDC GLUCOMTR-MCNC: 120 MG/DL (ref 70–99)
GLUCOSE BLDC GLUCOMTR-MCNC: 141 MG/DL (ref 70–99)
GLUCOSE BLDC GLUCOMTR-MCNC: 171 MG/DL (ref 70–99)
GLUCOSE SERPL-MCNC: 143 MG/DL (ref 65–99)
HCT VFR BLD AUTO: 41.3 % (ref 37.5–51)
HGB BLD-MCNC: 13.9 G/DL (ref 13–17.7)
IMM GRANULOCYTES # BLD AUTO: 0.03 10*3/MM3 (ref 0–0.05)
IMM GRANULOCYTES NFR BLD AUTO: 0.4 % (ref 0–0.5)
LYMPHOCYTES # BLD AUTO: 0.85 10*3/MM3 (ref 0.7–3.1)
LYMPHOCYTES NFR BLD AUTO: 11.8 % (ref 19.6–45.3)
MCH RBC QN AUTO: 28.9 PG (ref 26.6–33)
MCHC RBC AUTO-ENTMCNC: 33.7 G/DL (ref 31.5–35.7)
MCV RBC AUTO: 85.9 FL (ref 79–97)
MONOCYTES # BLD AUTO: 0.5 10*3/MM3 (ref 0.1–0.9)
MONOCYTES NFR BLD AUTO: 7 % (ref 5–12)
NEUTROPHILS NFR BLD AUTO: 5.73 10*3/MM3 (ref 1.7–7)
NEUTROPHILS NFR BLD AUTO: 79.7 % (ref 42.7–76)
NRBC BLD AUTO-RTO: 0 /100 WBC (ref 0–0.2)
PLATELET # BLD AUTO: 100 10*3/MM3 (ref 140–450)
PMV BLD AUTO: 10.7 FL (ref 6–12)
POTASSIUM SERPL-SCNC: 4.5 MMOL/L (ref 3.5–5.2)
PROCALCITONIN SERPL-MCNC: 0.07 NG/ML (ref 0–0.25)
PROT SERPL-MCNC: 6.9 G/DL (ref 6–8.5)
RBC # BLD AUTO: 4.81 10*6/MM3 (ref 4.14–5.8)
RBC MORPH BLD: NORMAL
SMALL PLATELETS BLD QL SMEAR: NORMAL
SODIUM SERPL-SCNC: 134 MMOL/L (ref 136–145)
WBC MORPH BLD: NORMAL
WBC NRBC COR # BLD: 7.19 10*3/MM3 (ref 3.4–10.8)

## 2021-12-01 PROCEDURE — 93922 UPR/L XTREMITY ART 2 LEVELS: CPT | Performed by: SURGERY

## 2021-12-01 PROCEDURE — 25010000002 PIPERACILLIN SOD-TAZOBACTAM PER 1 G: Performed by: INTERNAL MEDICINE

## 2021-12-01 PROCEDURE — 99222 1ST HOSP IP/OBS MODERATE 55: CPT | Performed by: PODIATRIST

## 2021-12-01 PROCEDURE — 85007 BL SMEAR W/DIFF WBC COUNT: CPT | Performed by: INTERNAL MEDICINE

## 2021-12-01 PROCEDURE — 99215 OFFICE O/P EST HI 40 MIN: CPT | Performed by: EMERGENCY MEDICINE

## 2021-12-01 PROCEDURE — 63710000001 INSULIN LISPRO (HUMAN) PER 5 UNITS: Performed by: INTERNAL MEDICINE

## 2021-12-01 PROCEDURE — 25010000002 ENOXAPARIN PER 10 MG: Performed by: INTERNAL MEDICINE

## 2021-12-01 PROCEDURE — 93922 UPR/L XTREMITY ART 2 LEVELS: CPT

## 2021-12-01 PROCEDURE — 82962 GLUCOSE BLOOD TEST: CPT | Performed by: EMERGENCY MEDICINE

## 2021-12-01 PROCEDURE — G0463 HOSPITAL OUTPT CLINIC VISIT: HCPCS | Performed by: EMERGENCY MEDICINE

## 2021-12-01 PROCEDURE — 73630 X-RAY EXAM OF FOOT: CPT

## 2021-12-01 PROCEDURE — 84145 PROCALCITONIN (PCT): CPT | Performed by: INTERNAL MEDICINE

## 2021-12-01 PROCEDURE — 83605 ASSAY OF LACTIC ACID: CPT | Performed by: INTERNAL MEDICINE

## 2021-12-01 PROCEDURE — 82962 GLUCOSE BLOOD TEST: CPT

## 2021-12-01 PROCEDURE — 63710000001 INSULIN DETEMIR PER 5 UNITS: Performed by: INTERNAL MEDICINE

## 2021-12-01 PROCEDURE — 93970 EXTREMITY STUDY: CPT | Performed by: SURGERY

## 2021-12-01 PROCEDURE — 85025 COMPLETE CBC W/AUTO DIFF WBC: CPT | Performed by: INTERNAL MEDICINE

## 2021-12-01 PROCEDURE — 80053 COMPREHEN METABOLIC PANEL: CPT | Performed by: INTERNAL MEDICINE

## 2021-12-01 PROCEDURE — 93970 EXTREMITY STUDY: CPT

## 2021-12-01 RX ORDER — HYDROCODONE BITARTRATE AND ACETAMINOPHEN 5; 325 MG/1; MG/1
1 TABLET ORAL EVERY 4 HOURS PRN
Status: CANCELLED | OUTPATIENT
Start: 2021-12-01 | End: 2021-12-08

## 2021-12-01 RX ORDER — BISACODYL 10 MG
10 SUPPOSITORY, RECTAL RECTAL DAILY PRN
Status: CANCELLED | OUTPATIENT
Start: 2021-12-01

## 2021-12-01 RX ORDER — SODIUM CHLORIDE 0.9 % (FLUSH) 0.9 %
10 SYRINGE (ML) INJECTION AS NEEDED
Status: CANCELLED | OUTPATIENT
Start: 2021-12-01

## 2021-12-01 RX ORDER — POLYETHYLENE GLYCOL 3350 17 G/17G
17 POWDER, FOR SOLUTION ORAL DAILY PRN
Status: CANCELLED | OUTPATIENT
Start: 2021-12-01

## 2021-12-01 RX ORDER — NALOXONE HCL 0.4 MG/ML
0.4 VIAL (ML) INJECTION
Status: DISCONTINUED | OUTPATIENT
Start: 2021-12-01 | End: 2021-12-21 | Stop reason: HOSPADM

## 2021-12-01 RX ORDER — SODIUM CHLORIDE 0.9 % (FLUSH) 0.9 %
10 SYRINGE (ML) INJECTION EVERY 12 HOURS SCHEDULED
Status: DISCONTINUED | OUTPATIENT
Start: 2021-12-01 | End: 2021-12-21 | Stop reason: HOSPADM

## 2021-12-01 RX ORDER — MORPHINE SULFATE 2 MG/ML
2 INJECTION, SOLUTION INTRAMUSCULAR; INTRAVENOUS
Status: DISCONTINUED | OUTPATIENT
Start: 2021-12-01 | End: 2021-12-02

## 2021-12-01 RX ORDER — ONDANSETRON 2 MG/ML
4 INJECTION INTRAMUSCULAR; INTRAVENOUS EVERY 4 HOURS PRN
Status: DISCONTINUED | OUTPATIENT
Start: 2021-12-01 | End: 2021-12-10

## 2021-12-01 RX ORDER — AMOXICILLIN 250 MG
1 CAPSULE ORAL 2 TIMES DAILY
Status: CANCELLED | OUTPATIENT
Start: 2021-12-01

## 2021-12-01 RX ORDER — TEMAZEPAM 15 MG/1
15 CAPSULE ORAL NIGHTLY PRN
Status: ACTIVE | OUTPATIENT
Start: 2021-12-01 | End: 2021-12-08

## 2021-12-01 RX ORDER — ACETAMINOPHEN 325 MG/1
650 TABLET ORAL EVERY 4 HOURS PRN
Status: DISCONTINUED | OUTPATIENT
Start: 2021-12-01 | End: 2021-12-21 | Stop reason: HOSPADM

## 2021-12-01 RX ORDER — NICOTINE POLACRILEX 4 MG
15 LOZENGE BUCCAL
Status: DISCONTINUED | OUTPATIENT
Start: 2021-12-01 | End: 2021-12-21 | Stop reason: HOSPADM

## 2021-12-01 RX ORDER — NALOXONE HCL 0.4 MG/ML
0.4 VIAL (ML) INJECTION
Status: CANCELLED | OUTPATIENT
Start: 2021-12-01

## 2021-12-01 RX ORDER — FAMOTIDINE 20 MG/1
40 TABLET, FILM COATED ORAL DAILY
Status: DISCONTINUED | OUTPATIENT
Start: 2021-12-01 | End: 2021-12-21 | Stop reason: HOSPADM

## 2021-12-01 RX ORDER — ALUMINA, MAGNESIA, AND SIMETHICONE 2400; 2400; 240 MG/30ML; MG/30ML; MG/30ML
15 SUSPENSION ORAL EVERY 6 HOURS PRN
Status: CANCELLED | OUTPATIENT
Start: 2021-12-01

## 2021-12-01 RX ORDER — TAMSULOSIN HYDROCHLORIDE 0.4 MG/1
0.4 CAPSULE ORAL NIGHTLY
Status: DISCONTINUED | OUTPATIENT
Start: 2021-12-01 | End: 2021-12-21 | Stop reason: HOSPADM

## 2021-12-01 RX ORDER — BISACODYL 5 MG/1
5 TABLET, DELAYED RELEASE ORAL DAILY PRN
Status: CANCELLED | OUTPATIENT
Start: 2021-12-01

## 2021-12-01 RX ORDER — ALPRAZOLAM 0.25 MG/1
0.25 TABLET ORAL EVERY 6 HOURS PRN
Status: CANCELLED | OUTPATIENT
Start: 2021-12-01 | End: 2021-12-08

## 2021-12-01 RX ORDER — METOPROLOL SUCCINATE 50 MG/1
50 TABLET, EXTENDED RELEASE ORAL
Status: DISCONTINUED | OUTPATIENT
Start: 2021-12-02 | End: 2021-12-05

## 2021-12-01 RX ORDER — HYDROCODONE BITARTRATE AND ACETAMINOPHEN 5; 325 MG/1; MG/1
1 TABLET ORAL EVERY 4 HOURS PRN
Status: DISCONTINUED | OUTPATIENT
Start: 2021-12-01 | End: 2021-12-06

## 2021-12-01 RX ORDER — DEXTROSE MONOHYDRATE 100 MG/ML
25 INJECTION, SOLUTION INTRAVENOUS
Status: DISCONTINUED | OUTPATIENT
Start: 2021-12-01 | End: 2021-12-21 | Stop reason: HOSPADM

## 2021-12-01 RX ORDER — ALPRAZOLAM 0.25 MG/1
0.25 TABLET ORAL EVERY 6 HOURS PRN
Status: ACTIVE | OUTPATIENT
Start: 2021-12-01 | End: 2021-12-08

## 2021-12-01 RX ORDER — POLYETHYLENE GLYCOL 3350 17 G/17G
17 POWDER, FOR SOLUTION ORAL DAILY PRN
Status: DISCONTINUED | OUTPATIENT
Start: 2021-12-01 | End: 2021-12-21 | Stop reason: HOSPADM

## 2021-12-01 RX ORDER — AMOXICILLIN 250 MG
1 CAPSULE ORAL 2 TIMES DAILY
Status: DISCONTINUED | OUTPATIENT
Start: 2021-12-01 | End: 2021-12-21 | Stop reason: HOSPADM

## 2021-12-01 RX ORDER — ALUMINA, MAGNESIA, AND SIMETHICONE 2400; 2400; 240 MG/30ML; MG/30ML; MG/30ML
15 SUSPENSION ORAL EVERY 6 HOURS PRN
Status: DISCONTINUED | OUTPATIENT
Start: 2021-12-01 | End: 2021-12-21 | Stop reason: HOSPADM

## 2021-12-01 RX ORDER — SODIUM CHLORIDE 0.9 % (FLUSH) 0.9 %
10 SYRINGE (ML) INJECTION AS NEEDED
Status: DISCONTINUED | OUTPATIENT
Start: 2021-12-01 | End: 2021-12-21 | Stop reason: HOSPADM

## 2021-12-01 RX ORDER — MORPHINE SULFATE 2 MG/ML
2 INJECTION, SOLUTION INTRAMUSCULAR; INTRAVENOUS
Status: CANCELLED | OUTPATIENT
Start: 2021-12-01 | End: 2021-12-04

## 2021-12-01 RX ORDER — BUPROPION HYDROCHLORIDE 150 MG/1
150 TABLET ORAL 2 TIMES DAILY
Status: DISCONTINUED | OUTPATIENT
Start: 2021-12-01 | End: 2021-12-21 | Stop reason: HOSPADM

## 2021-12-01 RX ORDER — FAMOTIDINE 20 MG/1
40 TABLET, FILM COATED ORAL DAILY
Status: CANCELLED | OUTPATIENT
Start: 2021-12-01

## 2021-12-01 RX ORDER — ACETAMINOPHEN 325 MG/1
650 TABLET ORAL EVERY 4 HOURS PRN
Status: CANCELLED | OUTPATIENT
Start: 2021-12-01

## 2021-12-01 RX ORDER — BISACODYL 5 MG/1
5 TABLET, DELAYED RELEASE ORAL DAILY PRN
Status: DISCONTINUED | OUTPATIENT
Start: 2021-12-01 | End: 2021-12-21 | Stop reason: HOSPADM

## 2021-12-01 RX ORDER — BISACODYL 10 MG
10 SUPPOSITORY, RECTAL RECTAL DAILY PRN
Status: DISCONTINUED | OUTPATIENT
Start: 2021-12-01 | End: 2021-12-21 | Stop reason: HOSPADM

## 2021-12-01 RX ORDER — SODIUM CHLORIDE 0.9 % (FLUSH) 0.9 %
10 SYRINGE (ML) INJECTION EVERY 12 HOURS SCHEDULED
Status: CANCELLED | OUTPATIENT
Start: 2021-12-01

## 2021-12-01 RX ORDER — CITALOPRAM 20 MG/1
10 TABLET ORAL DAILY
Status: DISCONTINUED | OUTPATIENT
Start: 2021-12-01 | End: 2021-12-21 | Stop reason: HOSPADM

## 2021-12-01 RX ORDER — TEMAZEPAM 15 MG/1
15 CAPSULE ORAL NIGHTLY PRN
Status: CANCELLED | OUTPATIENT
Start: 2021-12-01 | End: 2021-12-08

## 2021-12-01 RX ORDER — LISINOPRIL 20 MG/1
20 TABLET ORAL DAILY
Status: DISCONTINUED | OUTPATIENT
Start: 2021-12-01 | End: 2021-12-21 | Stop reason: HOSPADM

## 2021-12-01 RX ORDER — ONDANSETRON 2 MG/ML
4 INJECTION INTRAMUSCULAR; INTRAVENOUS EVERY 4 HOURS PRN
Status: CANCELLED | OUTPATIENT
Start: 2021-12-01

## 2021-12-01 RX ORDER — ATORVASTATIN CALCIUM 10 MG/1
10 TABLET, FILM COATED ORAL NIGHTLY
Status: DISCONTINUED | OUTPATIENT
Start: 2021-12-01 | End: 2021-12-21 | Stop reason: HOSPADM

## 2021-12-01 RX ADMIN — INSULIN LISPRO 2 UNITS: 100 INJECTION, SOLUTION INTRAVENOUS; SUBCUTANEOUS at 23:03

## 2021-12-01 RX ADMIN — TAMSULOSIN HYDROCHLORIDE 0.4 MG: 0.4 CAPSULE ORAL at 22:44

## 2021-12-01 RX ADMIN — ATORVASTATIN CALCIUM 10 MG: 10 TABLET, FILM COATED ORAL at 22:54

## 2021-12-01 RX ADMIN — INSULIN DETEMIR 25 UNITS: 100 INJECTION, SOLUTION SUBCUTANEOUS at 23:02

## 2021-12-01 RX ADMIN — ENOXAPARIN SODIUM 40 MG: 40 INJECTION SUBCUTANEOUS at 23:58

## 2021-12-01 RX ADMIN — PIPERACILLIN SODIUM AND TAZOBACTAM SODIUM 4.5 G: 4; .5 INJECTION, POWDER, LYOPHILIZED, FOR SOLUTION INTRAVENOUS at 22:44

## 2021-12-01 RX ADMIN — ENOXAPARIN SODIUM 40 MG: 40 INJECTION SUBCUTANEOUS at 14:06

## 2021-12-01 RX ADMIN — Medication: at 22:44

## 2021-12-01 RX ADMIN — SODIUM CHLORIDE, PRESERVATIVE FREE 10 ML: 5 INJECTION INTRAVENOUS at 22:44

## 2021-12-01 RX ADMIN — SODIUM CHLORIDE, PRESERVATIVE FREE 10 ML: 5 INJECTION INTRAVENOUS at 14:07

## 2021-12-01 RX ADMIN — CITALOPRAM HYDROBROMIDE 10 MG: 20 TABLET ORAL at 22:52

## 2021-12-01 RX ADMIN — BUPROPION HYDROCHLORIDE 150 MG: 150 TABLET, EXTENDED RELEASE ORAL at 22:55

## 2021-12-01 RX ADMIN — PIPERACILLIN SODIUM AND TAZOBACTAM SODIUM 4.5 G: 4; .5 INJECTION, POWDER, LYOPHILIZED, FOR SOLUTION INTRAVENOUS at 14:16

## 2021-12-01 NOTE — H&P
[facility]   HISTORY AND PHYSICAL    Patient Name: Jose Shaikh  : 1960  MRN: [id number]  Primary Care Physician:  Corazon Gr MD  Date of admission: 2021    Subjective   Subjective     Chief Complaint:   Right foot ulcer    HPI:    Mr. Shaikh  is 61-year-old male, admitted directly from wound care center for nonhealing wound on the right fourth toe and heel.  Patient has ongoing issues with nonhealing diabetic ulcers, getting hyperbaric treatment, patient was seen by physician Dr. Canales in the wound care center for several days, had lanced wound on the toe, despite oral antibiotics he was not getting better she recommended admission for possible sepsis and wound infection.  She also contacted podiatrist Dr. Leyva for surgical consideration..  When I saw patient he is awake alert /  Complains of right fourth toe ulcer and not healing.  Also reports some drainage.  Pain in the foot.  Heel ulcers also not healing very well left foot heel ulcer healing.  Sugars running good at home        Review of Systems:    No fever chills.  Difficulty walking.  Painful ulcers in the leg and toes.  Swelling of legs.  No hypo or hyperglycemic symptoms    Personal History     Past Medical History:   Diagnosis Date   • Absence of toe of right foot    • Acute osteomyelitis of left calcaneus  2021   • Anxiety and depression    • Arthritis    • Claustrophobia    • Corns and callus    • Diabetic ulcer of left heel associated with type 2 DM 2021   • Diabetic ulcer of left heel associated with type 2 DM 2021   • Diabetic ulcer of right midfoot associated with type 2 DM 2021   • Difficulty walking    • Essential hypertension 2021   • Hammertoe    • Hyperlipidemia LDL goal <100 2021   • Ingrown toenail    • Obesity    • Paroxysmal atrial fibrillation 2021   • Polyneuropathy    • Pressure ulcer, stage 1    • Tinea unguium    • Type 2 diabetes mellitus with polyneuropathy        Past  Surgical History:   Procedure Laterality Date   • CYST REMOVAL      center of back; benign   • INCISION AND DRAINAGE ABSCESS      back   • OTHER SURGICAL HISTORY      Surgical clips left foot   • TOE SURGERY Right     Removal of 5th toe   • WRIST SURGERY Left     repair of injury       Family History: family history includes Cancer in his father; Heart disease in his brother, father, and mother. Otherwise pertinent FHx was reviewed and not pertinent to current issue.    Social History:  reports that he quit smoking about 30 years ago. He smoked 0.00 packs per day for 1.00 year. He has never used smokeless tobacco. He reports current alcohol use. He reports current drug use. Drug: Marijuana.    Home Medications:  alfuzosin, apixaban, buPROPion XL, citalopram, doxycycline, glucose blood, insulin aspart, insulin detemir, lisinopril, metFORMIN, metoprolol succinate XL, sertraline, and simvastatin      Allergies:  Allergies   Allergen Reactions   • Adhesive Tape Rash       Objective   Objective     Vitals:   Temp:  [97.1 °F (36.2 °C)-98.5 °F (36.9 °C)] 98.5 °F (36.9 °C)  Heart Rate:  [72-74] 72  Resp:  [18] 18  BP: (109-110)/(68-69) 109/69    Physical Exam  Elderly morbidly obese male, not in acute distress.  Heart regular.  Lungs clear.  Abdomen obese.  Extremities with 2+ edema of lower extremities.  Ulcers on the right foot and toes.  Also on the left heel  Diminished peripheral pulses        I have personally reviewed the results from the time of this admission to 12/1/2021 14:00 EST and agree with these findings:  [x]  Laboratory  []  Microbiology  []  Radiology  []  EKG/Telemetry   []  Cardiology/Vascular   []  Pathology  []  Old records  []  Other:    CBC:    WBC   Date Value Ref Range Status   12/01/2021 7.19 3.40 - 10.80 10*3/mm3 Final     RBC   Date Value Ref Range Status   12/01/2021 4.81 4.14 - 5.80 10*6/mm3 Final     Hemoglobin   Date Value Ref Range Status   12/01/2021 13.9 13.0 - 17.7 g/dL Final      Hematocrit   Date Value Ref Range Status   12/01/2021 41.3 37.5 - 51.0 % Final     MCV   Date Value Ref Range Status   12/01/2021 85.9 79.0 - 97.0 fL Final     MCH   Date Value Ref Range Status   12/01/2021 28.9 26.6 - 33.0 pg Final     MCHC   Date Value Ref Range Status   12/01/2021 33.7 31.5 - 35.7 g/dL Final     RDW   Date Value Ref Range Status   12/01/2021 14.7 12.3 - 15.4 % Final     RDW-SD   Date Value Ref Range Status   12/01/2021 46.0 37.0 - 54.0 fl Final     MPV   Date Value Ref Range Status   12/01/2021 10.7 6.0 - 12.0 fL Final     Platelets   Date Value Ref Range Status   12/01/2021 100 (L) 140 - 450 10*3/mm3 Final     Neutrophil %   Date Value Ref Range Status   12/01/2021 79.7 (H) 42.7 - 76.0 % Final     Lymphocyte %   Date Value Ref Range Status   12/01/2021 11.8 (L) 19.6 - 45.3 % Final     Monocyte %   Date Value Ref Range Status   12/01/2021 7.0 5.0 - 12.0 % Final     Eosinophil %   Date Value Ref Range Status   12/01/2021 0.8 0.3 - 6.2 % Final     Basophil %   Date Value Ref Range Status   12/01/2021 0.3 0.0 - 1.5 % Final     Immature Grans %   Date Value Ref Range Status   12/01/2021 0.4 0.0 - 0.5 % Final     Neutrophils, Absolute   Date Value Ref Range Status   12/01/2021 5.73 1.70 - 7.00 10*3/mm3 Final     Lymphocytes, Absolute   Date Value Ref Range Status   12/01/2021 0.85 0.70 - 3.10 10*3/mm3 Final     Monocytes, Absolute   Date Value Ref Range Status   12/01/2021 0.50 0.10 - 0.90 10*3/mm3 Final     Eosinophils, Absolute   Date Value Ref Range Status   12/01/2021 0.06 0.00 - 0.40 10*3/mm3 Final     Basophils, Absolute   Date Value Ref Range Status   12/01/2021 0.02 0.00 - 0.20 10*3/mm3 Final     Immature Grans, Absolute   Date Value Ref Range Status   12/01/2021 0.03 0.00 - 0.05 10*3/mm3 Final     nRBC   Date Value Ref Range Status   12/01/2021 0.0 0.0 - 0.2 /100 WBC Final        BMP:    Lab Results   Component Value Date    GLUCOSE 143 (H) 12/01/2021    BUN 9 12/01/2021    CREATININE  0.69 (L) 12/01/2021    EGFRIFNONA 116 12/01/2021    BCR 13.0 12/01/2021    K 4.5 12/01/2021    CO2 23.9 12/01/2021    CALCIUM 9.1 12/01/2021    ALBUMIN 3.60 12/01/2021    LABIL2 1.3 (L) 08/07/2019    AST 48 (H) 12/01/2021    ALT 41 12/01/2021        No radiology results for the last day           Assessment/Plan   Assessment / Plan       Current Diagnosis:  Active Hospital Problems    Diagnosis    • Cellulitis and abscess of foot    • Lactic acidosis    • Essential hypertension    • Diabetic ulcer of right midfoot associated with type 2 DM    • Diabetic ulcer of left heel associated with type 2 DM      Plan:   Patient has diabetic foot ulcer.  Not healing, failed outpatient treatment with antibiotics and hyperbaric treatment.  Admit to hospital.  Lactic acid elevated.  IV antibiotics.  Pro-Gonzalo.  Podiatry consult.  Check x-ray.  Check vascular studies.  Monitor sugars and use sliding scale.  Further management be based on clinical course, lab results and consultant input      DVT prophylaxis:  Medical DVT prophylaxis orders are present.    GI Prophylaxis:    Pepcid  CODE STATUS:    Code Status (Patient has no pulse and is not breathing): CPR (Attempt to Resuscitate)  Medical Interventions (Patient has pulse or is breathing): Full Support    Admission Status:  I believe this patient meets inpatient status.             I have dictated this note utilizing Dragon Dictation.             Please note that portions of this note were completed with a voice recognition program.             Part of this note may be an electronic transcription/translation of spoken language to printed text         using the Dragon Dictation System.       Electronically signed by Yung Grijalva MD, 12/01/21, 12:55 PM EST.    Total time spent with in evaluation and management: 37 minutes

## 2021-12-01 NOTE — PROGRESS NOTES
"Chief Complaint  Wound Check (BILATERAL FOOT WOUNDS (BS: 120))    Subjective        Wound Check      Patient is a 60-year-old type II diabetic with neuropathy and insulin dependence.  He has been followed in the wound care clinic for nonhealing ulcers on his left heel and right distal midfoot. MRI showed osteomyelitis of the left heel. He had a PICC line placed 08/20/2021 and is undergoing IV antibiotics daily (ertapenem).  He recently started to undergo hyperbaric oxygen therapy for his osteomyelitis and is tolerating it well.  He is trying to control his sugars better and really watch what he eats.  He says he has a hard time offloading the wounds because he still has to do his ADLs and go to the laundromat etc.    He says he has been continuing to try to manage his dietary intake and eating more plant-based foods and cutting out fried foods and carbs as much as possible to improve his glucose control.  He has recently seen Kami Garcia for his diabetes and says he has already lost quite a bit of weight on his new medication and diet plan, but would like to lose another 50 pounds to start to really feel better he says.    He completed his full course of IV antibiotics and is continuing with HBO.  He has been doing PolyMem silver to both plantar wounds and they seem to be improving.  The pain he was having last week when he would swing his legs over the edge of the bed has returned.    At his visit last week he had swelling, redness, and burning on the top of his right foot and swelling of the right fourth toe.  He says he did trip and banging this foot but did not notice any injury at the time.  An infected hematoma was I&D did and he was placed on doxycycline.  Culture revealed MSSA, susceptible to doxy.  He says he was under the impression that he \"has no bones in those toes to break\" and that they are held together by metal rods after prior amputations.    For the past week he has not been feeling well and has " Health Maintenance Due   Topic Date Due   • DTaP/Tdap/Td Vaccine (1 - Tdap) 07/17/1957   • Shingles Vaccine (2 of 3) 10/19/2013   • Influenza Vaccine (1) 09/01/2019   • Medicare Wellness 65+  12/20/2019       Patient is due for the topics as listed above and wishes to proceed with them. Orders placed for Immunization(s) Influenza.         had a lot of nausea and intermittent episodes of vomiting.  He feels like the toe is getting worse and he is having a lot of blood in his shoe and on the dressings.  He has not had any fevers that he is aware of.  He is still on doxycycline.      Allergies:  Adhesive tape      Current Outpatient Medications:   •  alfuzosin (UROXATRAL) 10 MG 24 hr tablet, Take 10 mg by mouth Daily., Disp: , Rfl:   •  apixaban (Eliquis) 5 MG tablet tablet, Take 10 mg by mouth 2 (two) times a day., Disp: , Rfl:   •  buPROPion XL (WELLBUTRIN XL) 300 MG 24 hr tablet, Take 300 mg by mouth 2 (Two) Times a Day., Disp: , Rfl:   •  citalopram (CeleXA) 10 MG tablet, TAKE (1) TABLET BY MOUTH 1 TIME PER DAY AT BEDTIME, Disp: , Rfl:   •  doxycycline (VIBRAMYCIN) 100 MG capsule, Take 1 capsule by mouth 2 (Two) Times a Day for 10 days., Disp: 20 capsule, Rfl: 0  •  glucose blood test strip, Test blood sugar 3 times a day, Disp: 100 each, Rfl: 5  •  insulin aspart (NovoLOG) 100 UNIT/ML injection, Inject 20 Units under the skin into the appropriate area as directed 3 (Three) Times a Day Before Meals. Take up to 20 units as instructed, Disp: 20 mL, Rfl: 3  •  insulin detemir (LEVEMIR) 100 UNIT/ML injection, Inject 50 Units under the skin into the appropriate area as directed Every Night. Take 40 units and adjust to 50 as instructed, Disp: 20 mL, Rfl: 3  •  lisinopril (PRINIVIL,ZESTRIL) 20 MG tablet, Take 20 mg by mouth Daily., Disp: , Rfl:   •  metFORMIN (GLUCOPHAGE) 1000 MG tablet, Take 1,000 mg by mouth 2 (two) times a day., Disp: , Rfl:   •  metoprolol succinate XL (TOPROL-XL) 50 MG 24 hr tablet, Take 50 mg by mouth Daily., Disp: , Rfl:   •  sertraline (ZOLOFT) 100 MG tablet, Take 100 mg by mouth Daily., Disp: , Rfl:   •  simvastatin (Zocor) 20 MG tablet, Take 20 mg by mouth Every Night., Disp: , Rfl:     Past Medical History:   Diagnosis Date   • Absence of toe of right foot    • Acute osteomyelitis of left calcaneus  8/18/2021   • Anxiety and  "depression    • Arthritis    • Claustrophobia    • Corns and callus    • Diabetic ulcer of left heel associated with type 2 DM 2021   • Diabetic ulcer of left heel associated with type 2 DM 2021   • Diabetic ulcer of right midfoot associated with type 2 DM 2021   • Difficulty walking    • Essential hypertension 2021   • Hammertoe    • Hyperlipidemia LDL goal <100 2021   • Ingrown toenail    • Obesity    • Paroxysmal atrial fibrillation 2021   • Polyneuropathy    • Pressure ulcer, stage 1    • Tinea unguium    • Type 2 diabetes mellitus with polyneuropathy      Past Surgical History:   Procedure Laterality Date   • CYST REMOVAL      center of back; benign   • INCISION AND DRAINAGE ABSCESS      back   • OTHER SURGICAL HISTORY      Surgical clips left foot   • TOE SURGERY Right     Removal of 5th toe   • WRIST SURGERY Left     repair of injury     Social History     Socioeconomic History   • Marital status: Single   Tobacco Use   • Smoking status: Former Smoker     Packs/day: 1.00     Types: Cigarettes     Quit date: 1991     Years since quittin.2   • Smokeless tobacco: Never Used   Vaping Use   • Vaping Use: Never used   Substance and Sexual Activity   • Alcohol use: Yes     Comment: Rare   • Drug use: Not Currently   • Sexual activity: Defer     Family History   Problem Relation Age of Onset   • Heart disease Mother    • Heart disease Father    • Cancer Father         Unspecified   • Heart disease Brother          Objective     Vitals:    21 0843   BP: 110/68   BP Location: Left arm   Patient Position: Sitting   Pulse: 74   Resp: 18   Temp: 97.1 °F (36.2 °C)   TempSrc: Temporal   Weight: (!) 157 kg (346 lb)   Height: 188 cm (74\")   PainSc: 10-Worst pain ever   PainLoc: Foot     Body mass index is 44.42 kg/m².    STEADI Fall Risk Assessment has not been completed.     Review of Systems     ROS:  No fevers, chills, sweats, or body aches.     I have reviewed the HPI and ROS " as documented by MA/RN. Shea Daniels MD    Physical Exam     NAD, well dressed, well nourished, looks slightly paler than usual today and diminished skin turgor, appears dehydrated  Normocephalic, atraumatic  EOMI, no scleral icterus  No respiratory distress, no cough  AAOx3, pleasant, cooperative  Bilateral plantar wounds appear slightly improved although there is some blood noted at the right plantar wound.  Persistent recurrent callus again noted.  No erythema, purulence, induration, or fluctuance plantar wounds.  LLE wound on heel remains tender with pressure but not with sharp stimuli.     Right third and fourth toe with swelling and erythema.  The fourth toe is oozing some blood and there is also blood oozing from the plantar right foot wound.  I am able to express clots from the lateral aspect of the fourth toe.  Probing with sterile swab reveals that the plantar wound tunnels 3 cm dorsally and communicates with the fourth toe.  Flushing of the lateral toe wound results in saline coming out of the plantar wound.    See photos below for details.                        Result Review :  The following data was reviewed by: Shea Daniels MD on 12/30/2021:    Prior wound photos, prior office notes.  Nursing measurements.           Assessment and Plan   Diagnoses and all orders for this visit:    1. Traumatic hematoma of right lower leg with infection, subsequent encounter (Primary)    2. Cellulitis of right foot    3. 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 Other osteomyelitis of left foot (HCC)    5. Diabetic ulcer of right midfoot associated with type 2 DM    6. Failure of outpatient treatment    7. Diabetic polyneuropathy associated with type 2 diabetes mellitus (HCC)    Other orders  -     POC Glucose         Patient requires inpatient admission due to failure of outpatient antibiotic treatment.  The fourth toe is significantly infected still  in spite of culture directed outpatient antibiotic therapy.  There is now communication between the plantar wound and the fourth toe, with instability of the fourth toe concerning for osteomyelitis and pathological fracture.    Discussed with Dr. MAX Grijalva, patient to be a direct admit.  Discussed with Dr. Leyva, podiatry, who will consult and help direct inpatient treatment and take the patient to the operating room after definitive testing determines the best intervention for the patient.    Recheck within 1 week of discharge from hospital.    Follow Up   Return in about 10 days (around 12/11/2021) for Recheck.      Shea Daniels MD

## 2021-12-01 NOTE — NURSING NOTE
Spoke with Yaritza at [facility] pharmacy. Did clarify medications as prepared in prefilled med packs. Sertraline not mentioned, patient is certain that he takes this medication. I did request that patient have family bring medication packs in for clarification.

## 2021-12-01 NOTE — PLAN OF CARE
Goal Outcome Evaluation:        No complaints of pain since arrival to floor at approx 1200. VSS.

## 2021-12-01 NOTE — PROGRESS NOTES
Patient had wound check with Dr. Shea Daniels and is being admitted to [facility]. No Hyperbaric treatment today.

## 2021-12-02 ENCOUNTER — APPOINTMENT (OUTPATIENT)
Dept: GENERAL RADIOLOGY | Facility: HOSPITAL | Age: 61
End: 2021-12-02

## 2021-12-02 ENCOUNTER — ANESTHESIA (OUTPATIENT)
Dept: PERIOP | Facility: HOSPITAL | Age: 61
End: 2021-12-02

## 2021-12-02 ENCOUNTER — ANESTHESIA EVENT (OUTPATIENT)
Dept: PERIOP | Facility: HOSPITAL | Age: 61
End: 2021-12-02

## 2021-12-02 ENCOUNTER — APPOINTMENT (OUTPATIENT)
Dept: MRI IMAGING | Facility: HOSPITAL | Age: 61
End: 2021-12-02

## 2021-12-02 LAB
BH CV LOWER ARTERIAL LEFT ABI RATIO: 1.61
BH CV LOWER ARTERIAL LEFT DORSALIS PEDIS SYS MAX: 143 MMHG
BH CV LOWER ARTERIAL LEFT GREAT TOE SYS MAX: 115 MMHG
BH CV LOWER ARTERIAL LEFT POST TIBIAL SYS MAX: 179 MMHG
BH CV LOWER ARTERIAL LEFT TBI RATIO: 1.04
BH CV LOWER ARTERIAL RIGHT ABI RATIO: 1.36
BH CV LOWER ARTERIAL RIGHT DORSALIS PEDIS SYS MAX: 131 MMHG
BH CV LOWER ARTERIAL RIGHT GREAT TOE SYS MAX: 111 MMHG
BH CV LOWER ARTERIAL RIGHT POST TIBIAL SYS MAX: 151 MMHG
BH CV LOWER ARTERIAL RIGHT TBI RATIO: 1
BH CV LOWER VASCULAR LEFT COMMON FEMORAL AUGMENT: NORMAL
BH CV LOWER VASCULAR LEFT COMMON FEMORAL COMPETENT: NORMAL
BH CV LOWER VASCULAR LEFT COMMON FEMORAL COMPRESS: NORMAL
BH CV LOWER VASCULAR LEFT COMMON FEMORAL PHASIC: NORMAL
BH CV LOWER VASCULAR LEFT COMMON FEMORAL SPONT: NORMAL
BH CV LOWER VASCULAR LEFT DISTAL FEMORAL AUGMENT: NORMAL
BH CV LOWER VASCULAR LEFT DISTAL FEMORAL COMPETENT: NORMAL
BH CV LOWER VASCULAR LEFT DISTAL FEMORAL PHASIC: NORMAL
BH CV LOWER VASCULAR LEFT DISTAL FEMORAL SPONT: NORMAL
BH CV LOWER VASCULAR LEFT GREATER SAPH AK COMPRESS: NORMAL
BH CV LOWER VASCULAR LEFT MID FEMORAL COMPRESS: NORMAL
BH CV LOWER VASCULAR LEFT PERONEAL COMPRESS: NORMAL
BH CV LOWER VASCULAR LEFT POPLITEAL AUGMENT: NORMAL
BH CV LOWER VASCULAR LEFT POPLITEAL COMPETENT: NORMAL
BH CV LOWER VASCULAR LEFT POPLITEAL COMPRESS: NORMAL
BH CV LOWER VASCULAR LEFT POPLITEAL PHASIC: NORMAL
BH CV LOWER VASCULAR LEFT POPLITEAL SPONT: NORMAL
BH CV LOWER VASCULAR LEFT POSTERIOR TIBIAL AUGMENT: NORMAL
BH CV LOWER VASCULAR LEFT POSTERIOR TIBIAL COMPRESS: NORMAL
BH CV LOWER VASCULAR LEFT PROXIMAL FEMORAL COMPRESS: NORMAL
BH CV LOWER VASCULAR RIGHT COMMON FEMORAL AUGMENT: NORMAL
BH CV LOWER VASCULAR RIGHT COMMON FEMORAL COMPETENT: NORMAL
BH CV LOWER VASCULAR RIGHT COMMON FEMORAL COMPRESS: NORMAL
BH CV LOWER VASCULAR RIGHT COMMON FEMORAL PHASIC: NORMAL
BH CV LOWER VASCULAR RIGHT COMMON FEMORAL SPONT: NORMAL
BH CV LOWER VASCULAR RIGHT DISTAL FEMORAL PHASIC: NORMAL
BH CV LOWER VASCULAR RIGHT DISTAL FEMORAL SPONT: NORMAL
BH CV LOWER VASCULAR RIGHT GREATER SAPH AK COMPRESS: NORMAL
BH CV LOWER VASCULAR RIGHT MID FEMORAL AUGMENT: NORMAL
BH CV LOWER VASCULAR RIGHT MID FEMORAL COMPETENT: NORMAL
BH CV LOWER VASCULAR RIGHT MID FEMORAL PHASIC: NORMAL
BH CV LOWER VASCULAR RIGHT MID FEMORAL SPONT: NORMAL
BH CV LOWER VASCULAR RIGHT PERONEAL COMPRESS: NORMAL
BH CV LOWER VASCULAR RIGHT POPLITEAL AUGMENT: NORMAL
BH CV LOWER VASCULAR RIGHT POPLITEAL COMPETENT: NORMAL
BH CV LOWER VASCULAR RIGHT POPLITEAL COMPRESS: NORMAL
BH CV LOWER VASCULAR RIGHT POPLITEAL PHASIC: NORMAL
BH CV LOWER VASCULAR RIGHT POPLITEAL SPONT: NORMAL
BH CV LOWER VASCULAR RIGHT POSTERIOR TIBIAL AUGMENT: NORMAL
BH CV LOWER VASCULAR RIGHT POSTERIOR TIBIAL COMPRESS: NORMAL
BH CV LOWER VASCULAR RIGHT PROXIMAL FEMORAL COMPRESS: NORMAL
GLUCOSE BLDC GLUCOMTR-MCNC: 119 MG/DL (ref 70–99)
GLUCOSE BLDC GLUCOMTR-MCNC: 127 MG/DL (ref 70–99)
GLUCOSE BLDC GLUCOMTR-MCNC: 135 MG/DL (ref 70–99)
GLUCOSE BLDC GLUCOMTR-MCNC: 198 MG/DL (ref 70–99)
GLUCOSE BLDC GLUCOMTR-MCNC: 284 MG/DL (ref 70–99)
MAXIMAL PREDICTED HEART RATE: 157 BPM
MAXIMAL PREDICTED HEART RATE: 157 BPM
STRESS TARGET HR: 133 BPM
STRESS TARGET HR: 133 BPM
UPPER ARTERIAL LEFT ARM BRACHIAL SYS MAX: 111 MMHG
UPPER ARTERIAL RIGHT ARM BRACHIAL SYS MAX: 110 MMHG

## 2021-12-02 PROCEDURE — 0Y6M0ZC DETACHMENT AT RIGHT FOOT, PARTIAL 3RD RAY, OPEN APPROACH: ICD-10-PCS | Performed by: PODIATRIST

## 2021-12-02 PROCEDURE — 88311 DECALCIFY TISSUE: CPT | Performed by: PODIATRIST

## 2021-12-02 PROCEDURE — 25010000002 DEXAMETHASONE PER 1 MG: Performed by: NURSE ANESTHETIST, CERTIFIED REGISTERED

## 2021-12-02 PROCEDURE — 0Y6M0ZF DETACHMENT AT RIGHT FOOT, PARTIAL 5TH RAY, OPEN APPROACH: ICD-10-PCS | Performed by: PODIATRIST

## 2021-12-02 PROCEDURE — 82962 GLUCOSE BLOOD TEST: CPT

## 2021-12-02 PROCEDURE — 28805 AMPUTATION THRU METATARSAL: CPT | Performed by: PODIATRIST

## 2021-12-02 PROCEDURE — 88307 TISSUE EXAM BY PATHOLOGIST: CPT | Performed by: PODIATRIST

## 2021-12-02 PROCEDURE — A9577 INJ MULTIHANCE: HCPCS | Performed by: INTERNAL MEDICINE

## 2021-12-02 PROCEDURE — 25010000002 HYDROMORPHONE 1 MG/ML SOLUTION: Performed by: NURSE ANESTHETIST, CERTIFIED REGISTERED

## 2021-12-02 PROCEDURE — 25010000002 ONDANSETRON PER 1 MG: Performed by: NURSE ANESTHETIST, CERTIFIED REGISTERED

## 2021-12-02 PROCEDURE — 25010000002 PIPERACILLIN SOD-TAZOBACTAM PER 1 G: Performed by: INTERNAL MEDICINE

## 2021-12-02 PROCEDURE — 0Y6M0ZD DETACHMENT AT RIGHT FOOT, PARTIAL 4TH RAY, OPEN APPROACH: ICD-10-PCS | Performed by: PODIATRIST

## 2021-12-02 PROCEDURE — 25010000002 FENTANYL CITRATE (PF) 50 MCG/ML SOLUTION: Performed by: NURSE ANESTHETIST, CERTIFIED REGISTERED

## 2021-12-02 PROCEDURE — 25010000002 PROPOFOL 10 MG/ML EMULSION: Performed by: NURSE ANESTHETIST, CERTIFIED REGISTERED

## 2021-12-02 PROCEDURE — 63710000001 INSULIN DETEMIR PER 5 UNITS: Performed by: INTERNAL MEDICINE

## 2021-12-02 PROCEDURE — 0Y6M0Z9 DETACHMENT AT RIGHT FOOT, PARTIAL 1ST RAY, OPEN APPROACH: ICD-10-PCS | Performed by: PODIATRIST

## 2021-12-02 PROCEDURE — 0 GADOBENATE DIMEGLUMINE 529 MG/ML SOLUTION: Performed by: INTERNAL MEDICINE

## 2021-12-02 PROCEDURE — 73720 MRI LWR EXTREMITY W/O&W/DYE: CPT

## 2021-12-02 PROCEDURE — 0Y6M0ZB DETACHMENT AT RIGHT FOOT, PARTIAL 2ND RAY, OPEN APPROACH: ICD-10-PCS | Performed by: PODIATRIST

## 2021-12-02 PROCEDURE — 0 LIDOCAINE 1 % SOLUTION: Performed by: PODIATRIST

## 2021-12-02 PROCEDURE — 73620 X-RAY EXAM OF FOOT: CPT

## 2021-12-02 DEVICE — SEAL HEMO SURG ARISTA/AH ABS/PWDR 3GM: Type: IMPLANTABLE DEVICE | Site: FOOT | Status: FUNCTIONAL

## 2021-12-02 RX ORDER — PROMETHAZINE HYDROCHLORIDE 12.5 MG/1
25 TABLET ORAL ONCE AS NEEDED
Status: DISCONTINUED | OUTPATIENT
Start: 2021-12-02 | End: 2021-12-02

## 2021-12-02 RX ORDER — MORPHINE SULFATE 2 MG/ML
2 INJECTION, SOLUTION INTRAMUSCULAR; INTRAVENOUS
Status: DISPENSED | OUTPATIENT
Start: 2021-12-02 | End: 2021-12-09

## 2021-12-02 RX ORDER — MAGNESIUM HYDROXIDE 1200 MG/15ML
LIQUID ORAL AS NEEDED
Status: DISCONTINUED | OUTPATIENT
Start: 2021-12-02 | End: 2021-12-02 | Stop reason: HOSPADM

## 2021-12-02 RX ORDER — LIDOCAINE HYDROCHLORIDE 10 MG/ML
INJECTION, SOLUTION INFILTRATION; PERINEURAL AS NEEDED
Status: DISCONTINUED | OUTPATIENT
Start: 2021-12-02 | End: 2021-12-02 | Stop reason: HOSPADM

## 2021-12-02 RX ORDER — SUCCINYLCHOLINE/SOD CL,ISO/PF 100 MG/5ML
SYRINGE (ML) INTRAVENOUS AS NEEDED
Status: DISCONTINUED | OUTPATIENT
Start: 2021-12-02 | End: 2021-12-02 | Stop reason: SURG

## 2021-12-02 RX ORDER — PHENYLEPHRINE HCL IN 0.9% NACL 1 MG/10 ML
SYRINGE (ML) INTRAVENOUS AS NEEDED
Status: DISCONTINUED | OUTPATIENT
Start: 2021-12-02 | End: 2021-12-02 | Stop reason: SURG

## 2021-12-02 RX ORDER — ONDANSETRON 2 MG/ML
4 INJECTION INTRAMUSCULAR; INTRAVENOUS ONCE AS NEEDED
Status: DISCONTINUED | OUTPATIENT
Start: 2021-12-02 | End: 2021-12-02

## 2021-12-02 RX ORDER — FENTANYL CITRATE 50 UG/ML
INJECTION, SOLUTION INTRAMUSCULAR; INTRAVENOUS AS NEEDED
Status: DISCONTINUED | OUTPATIENT
Start: 2021-12-02 | End: 2021-12-02 | Stop reason: SURG

## 2021-12-02 RX ORDER — ACETAMINOPHEN 650 MG/1
650 SUPPOSITORY RECTAL EVERY 4 HOURS PRN
Status: DISCONTINUED | OUTPATIENT
Start: 2021-12-02 | End: 2021-12-21 | Stop reason: HOSPADM

## 2021-12-02 RX ORDER — DEXAMETHASONE SODIUM PHOSPHATE 4 MG/ML
INJECTION, SOLUTION INTRA-ARTICULAR; INTRALESIONAL; INTRAMUSCULAR; INTRAVENOUS; SOFT TISSUE AS NEEDED
Status: DISCONTINUED | OUTPATIENT
Start: 2021-12-02 | End: 2021-12-02 | Stop reason: SURG

## 2021-12-02 RX ORDER — PROMETHAZINE HYDROCHLORIDE 25 MG/1
25 SUPPOSITORY RECTAL ONCE AS NEEDED
Status: DISCONTINUED | OUTPATIENT
Start: 2021-12-02 | End: 2021-12-02

## 2021-12-02 RX ORDER — ONDANSETRON 2 MG/ML
INJECTION INTRAMUSCULAR; INTRAVENOUS AS NEEDED
Status: DISCONTINUED | OUTPATIENT
Start: 2021-12-02 | End: 2021-12-02 | Stop reason: SURG

## 2021-12-02 RX ORDER — HYDROCODONE BITARTRATE AND ACETAMINOPHEN 10; 325 MG/1; MG/1
1 TABLET ORAL EVERY 4 HOURS PRN
Status: DISPENSED | OUTPATIENT
Start: 2021-12-02 | End: 2021-12-09

## 2021-12-02 RX ORDER — LIDOCAINE HYDROCHLORIDE 20 MG/ML
INJECTION, SOLUTION INFILTRATION; PERINEURAL AS NEEDED
Status: DISCONTINUED | OUTPATIENT
Start: 2021-12-02 | End: 2021-12-02 | Stop reason: SURG

## 2021-12-02 RX ORDER — MEPERIDINE HYDROCHLORIDE 25 MG/ML
12.5 INJECTION INTRAMUSCULAR; INTRAVENOUS; SUBCUTANEOUS
Status: DISCONTINUED | OUTPATIENT
Start: 2021-12-02 | End: 2021-12-02

## 2021-12-02 RX ORDER — SODIUM CHLORIDE, SODIUM LACTATE, POTASSIUM CHLORIDE, CALCIUM CHLORIDE 600; 310; 30; 20 MG/100ML; MG/100ML; MG/100ML; MG/100ML
INJECTION, SOLUTION INTRAVENOUS CONTINUOUS PRN
Status: DISCONTINUED | OUTPATIENT
Start: 2021-12-02 | End: 2021-12-02 | Stop reason: SURG

## 2021-12-02 RX ORDER — ACETAMINOPHEN 325 MG/1
650 TABLET ORAL EVERY 4 HOURS PRN
Status: DISCONTINUED | OUTPATIENT
Start: 2021-12-02 | End: 2021-12-10

## 2021-12-02 RX ORDER — EPHEDRINE SULFATE 50 MG/ML
INJECTION, SOLUTION INTRAVENOUS AS NEEDED
Status: DISCONTINUED | OUTPATIENT
Start: 2021-12-02 | End: 2021-12-02 | Stop reason: SURG

## 2021-12-02 RX ORDER — OXYCODONE HYDROCHLORIDE 5 MG/1
5 TABLET ORAL
Status: DISCONTINUED | OUTPATIENT
Start: 2021-12-02 | End: 2021-12-02

## 2021-12-02 RX ORDER — ROCURONIUM BROMIDE 10 MG/ML
INJECTION, SOLUTION INTRAVENOUS AS NEEDED
Status: DISCONTINUED | OUTPATIENT
Start: 2021-12-02 | End: 2021-12-02 | Stop reason: SURG

## 2021-12-02 RX ORDER — PROPOFOL 10 MG/ML
VIAL (ML) INTRAVENOUS AS NEEDED
Status: DISCONTINUED | OUTPATIENT
Start: 2021-12-02 | End: 2021-12-02 | Stop reason: SURG

## 2021-12-02 RX ORDER — HYDROCODONE BITARTRATE AND ACETAMINOPHEN 5; 325 MG/1; MG/1
1 TABLET ORAL EVERY 4 HOURS PRN
Status: DISPENSED | OUTPATIENT
Start: 2021-12-02 | End: 2021-12-09

## 2021-12-02 RX ADMIN — HYDROMORPHONE HYDROCHLORIDE 0.5 MG: 1 INJECTION, SOLUTION INTRAMUSCULAR; INTRAVENOUS; SUBCUTANEOUS at 21:51

## 2021-12-02 RX ADMIN — PROPOFOL 200 MG: 10 INJECTION, EMULSION INTRAVENOUS at 19:35

## 2021-12-02 RX ADMIN — PIPERACILLIN SODIUM AND TAZOBACTAM SODIUM 4.5 G: 4; .5 INJECTION, POWDER, LYOPHILIZED, FOR SOLUTION INTRAVENOUS at 05:54

## 2021-12-02 RX ADMIN — ROCURONIUM BROMIDE 35 MG: 10 INJECTION INTRAVENOUS at 19:53

## 2021-12-02 RX ADMIN — Medication 100 MCG: at 20:48

## 2021-12-02 RX ADMIN — ATORVASTATIN CALCIUM 10 MG: 10 TABLET, FILM COATED ORAL at 23:39

## 2021-12-02 RX ADMIN — LIDOCAINE HYDROCHLORIDE 50 MG: 20 INJECTION, SOLUTION INFILTRATION; PERINEURAL at 19:35

## 2021-12-02 RX ADMIN — SODIUM CHLORIDE, POTASSIUM CHLORIDE, SODIUM LACTATE AND CALCIUM CHLORIDE: 600; 310; 30; 20 INJECTION, SOLUTION INTRAVENOUS at 19:35

## 2021-12-02 RX ADMIN — GADOBENATE DIMEGLUMINE 20 ML: 529 INJECTION, SOLUTION INTRAVENOUS at 07:00

## 2021-12-02 RX ADMIN — EPHEDRINE SULFATE 10 MG: 50 INJECTION INTRAVENOUS at 20:11

## 2021-12-02 RX ADMIN — EPHEDRINE SULFATE 20 MG: 50 INJECTION INTRAVENOUS at 20:21

## 2021-12-02 RX ADMIN — DEXAMETHASONE SODIUM PHOSPHATE 4 MG: 4 INJECTION INTRA-ARTICULAR; INTRALESIONAL; INTRAMUSCULAR; INTRAVENOUS; SOFT TISSUE at 19:44

## 2021-12-02 RX ADMIN — ROCURONIUM BROMIDE 5 MG: 10 INJECTION INTRAVENOUS at 19:35

## 2021-12-02 RX ADMIN — LISINOPRIL 20 MG: 20 TABLET ORAL at 08:33

## 2021-12-02 RX ADMIN — Medication: at 08:38

## 2021-12-02 RX ADMIN — PIPERACILLIN SODIUM AND TAZOBACTAM SODIUM 4.5 G: 4; .5 INJECTION, POWDER, LYOPHILIZED, FOR SOLUTION INTRAVENOUS at 17:22

## 2021-12-02 RX ADMIN — FENTANYL CITRATE 50 MCG: 50 INJECTION, SOLUTION INTRAMUSCULAR; INTRAVENOUS at 19:35

## 2021-12-02 RX ADMIN — EPHEDRINE SULFATE 10 MG: 50 INJECTION INTRAVENOUS at 20:15

## 2021-12-02 RX ADMIN — SODIUM CHLORIDE, POTASSIUM CHLORIDE, SODIUM LACTATE AND CALCIUM CHLORIDE: 600; 310; 30; 20 INJECTION, SOLUTION INTRAVENOUS at 20:46

## 2021-12-02 RX ADMIN — ONDANSETRON 4 MG: 2 INJECTION INTRAMUSCULAR; INTRAVENOUS at 19:44

## 2021-12-02 RX ADMIN — EPHEDRINE SULFATE 10 MG: 50 INJECTION INTRAVENOUS at 20:01

## 2021-12-02 RX ADMIN — BUPROPION HYDROCHLORIDE 150 MG: 150 TABLET, EXTENDED RELEASE ORAL at 08:33

## 2021-12-02 RX ADMIN — SODIUM CHLORIDE, PRESERVATIVE FREE 10 ML: 5 INJECTION INTRAVENOUS at 08:36

## 2021-12-02 RX ADMIN — TAMSULOSIN HYDROCHLORIDE 0.4 MG: 0.4 CAPSULE ORAL at 23:39

## 2021-12-02 RX ADMIN — METOPROLOL SUCCINATE 50 MG: 50 TABLET, EXTENDED RELEASE ORAL at 08:36

## 2021-12-02 RX ADMIN — HYDROCODONE BITARTRATE AND ACETAMINOPHEN 1 TABLET: 10; 325 TABLET ORAL at 23:39

## 2021-12-02 RX ADMIN — HYDROMORPHONE HYDROCHLORIDE 0.5 MG: 1 INJECTION, SOLUTION INTRAMUSCULAR; INTRAVENOUS; SUBCUTANEOUS at 21:40

## 2021-12-02 RX ADMIN — Medication 100 MCG: at 21:00

## 2021-12-02 RX ADMIN — Medication 180 MG: at 19:35

## 2021-12-02 RX ADMIN — INSULIN DETEMIR 25 UNITS: 100 INJECTION, SOLUTION SUBCUTANEOUS at 23:38

## 2021-12-02 RX ADMIN — CITALOPRAM HYDROBROMIDE 10 MG: 20 TABLET ORAL at 23:39

## 2021-12-02 NOTE — SIGNIFICANT NOTE
12/02/21 1000   Wound 06/22/21 1133 Left lateral heel   Placement Date/Time: 06/22/21 1133   Present on Hospital Admission: Yes  Side: Left  Orientation: lateral  Location: heel   Dressing Appearance intact; dried drainage   Base pink; moist   Periwound dry; intact; indurated   Periwound Temperature warm   Periwound Skin Turgor firm   Edges rolled/closed   Wound Length (cm) 2.9 cm   Wound Width (cm) 2.5 cm   Drainage Characteristics/Odor serosanguineous   Drainage Amount small   Care, Wound cleansed with; sterile normal saline   Dressing Care dressing applied; silver impregnated; foam; dressing reinforced; gauze, dry; gauze; elastic bandage   Periwound Care other (see comments)  (cleansed with normal saline)   Wound 06/22/21 1130 Right midline foot   Placement Date/Time: 06/22/21 1130   Present on Hospital Admission: Yes  Side: Right  Orientation: midline  Location: foot   Dressing Appearance intact; dried drainage   Base gray; slough; dry   Periwound dry; warm   Periwound Temperature warm   Periwound Skin Turgor firm   Edges open   Wound Length (cm) 1.4 cm   Wound Width (cm) 1.8 cm   Drainage Characteristics/Odor serosanguineous   Drainage Amount small   Care, Wound cleansed with; sterile normal saline   Dressing Care dressing changed; gauze, wet-to-moist; other (see comments); dressing reinforced; gauze, dry; dressing applied; gauze; elastic bandage  (1/4 strength Dakins)   Periwound Care other (see comments)  (cleansed with normal saline)   Wound 11/22/21 0856 Right anterior fourth toe   Placement Date/Time: 11/22/21 0856   Present on Hospital Admission: Yes  Side: Right  Orientation: anterior  Location: fourth toe   Dressing Appearance moist drainage; intact   Periwound edematous; warm; redness   Periwound Temperature warm   Periwound Skin Turgor soft   Drainage Characteristics/Odor yellow; tan   Drainage Amount small   Care, Wound cleansed with; sterile normal saline   Dressing Care dressing moistened; other  (see comments); dressing reinforced; gauze, dry; dressing applied; gauze; elastic bandage  (1/4 strength dakins)   Periwound Care other (see comments)  (cleansed with normal saline)     Wound Consult: Patient has been admitted for cellulitis of the foot. History of diabetes mellitus type II, essential hypertension, anxiety, depression, polyneuropathy, hyperlipidemia, and obesity. Patient is scheduled to have a right transmetatarsal amputation done by Dr. Leyva. Left lateral heel wound bed is pink and moist, edges are rolled. Periwound is indurated and tender to touch. Wound bed was cleansed with copious amounts of normal saline. Filled wound bed with silver impregnated foam, covered with dry gauze; wrapped with Kerlix and elastic bandage. Right midline foot wound base was dry and gray in color; some sloughing was present. Periwound was warm and dry. Cleansed wound bed with normal saline and gauze. Filled wound with 1/4 strength Dakins moistened gauze and covered with dry gauze. Right anterior fourth toe was reddened and edematous. Noted small open area on lateral toe; small amount of yellow, tan drainage noted from wound. Cleansed with normal saline and gauze. Placed 1/4 strength moistened gauze to wound and covered with dry gauze; wrapped with Kerlix and an elastic bandage. Recommend to continue wound orders at this time. Karon Bolton RN

## 2021-12-02 NOTE — PLAN OF CARE
Goal Outcome Evaluation:  Plan of Care Reviewed With: patient        Progress: no change  Outcome Summary: Patient to be NPO after breakfast for surgery. Wound care provided per MD orders. VSS. Ambulating in room well with walker.

## 2021-12-02 NOTE — ANESTHESIA PREPROCEDURE EVALUATION
Anesthesia Evaluation     Patient summary reviewed and Nursing notes reviewed   NPO Solid Status: > 4 hours  NPO Liquid Status: > 4 hours           Airway   Mallampati: II  TM distance: >3 FB  Neck ROM: full  Dental      Pulmonary    (+) a smoker Former,   Cardiovascular   Exercise tolerance: poor (<4 METS)    ECG reviewed  Rhythm: irregular    (+) hypertension, dysrhythmias (metoprolol; Eliquis (LD 12/1/21)), hyperlipidemia,       Neuro/Psych  (+) numbness, psychiatric history (claustrophobia) Anxiety and Depression,     GI/Hepatic/Renal/Endo    (+) obesity, morbid obesity,  diabetes mellitus type 2,     Musculoskeletal     Abdominal    Substance History   (+) drug use (marijuana daily)  Alcohol use: marijuana.     OB/GYN          Other   arthritis,      ROS/Med Hx Other: Ate full breakfast at 0830 12/2/21                Anesthesia Plan    ASA 4     general and MAC     intravenous induction     Anesthetic plan, all risks, benefits, and alternatives have been provided, discussed and informed consent has been obtained with: patient.

## 2021-12-02 NOTE — CONSULTS
"[facility] [place]   HISTORY AND PHYSICAL    Patient Name: Jose Shaikh  : 1960  MRN: [id number]  Primary Care Physician:  Corazon Gr MD  Date of admission: 2021    Subjective   Subjective     Chief Complaint: Worsening ulceration on plantar right forefoot    HPI:  Jose Shaikh is a 61 y.o. male admitted directly from wound care center for nonhealing wound on the right fourth toe and heel.  Patient has ongoing issues with nonhealing diabetic ulcers, getting hyperbaric treatment, patient was seen by physician Dr. Daniels in the wound care center for several days, had lanced wound on the toe, despite oral antibiotics he was not getting better.  She recommended admission for possible sepsis and wound infection.  She also contacted podiatrist Dr. Leyva for surgical consideration.      Patient complains of nonhealing wound on right forefoot.  He states his left heel wound is stable and slowly improving.    Patient states his blood sugars are \"good\" at home.    Patient denies any fevers, chills, nausea, vomiting, shortness of breathe, nor any other constitutional signs nor symptoms.    Review of Systems   All systems were reviewed and negative except for: Nonhealing ulcerations bilaterally    Personal History     Past Medical History:   Diagnosis Date   • Absence of toe of right foot    • Acute osteomyelitis of left calcaneus  2021   • Anxiety and depression    • Arthritis    • Claustrophobia    • Corns and callus    • Diabetic ulcer of left heel associated with type 2 DM 2021   • Diabetic ulcer of left heel associated with type 2 DM 2021   • Diabetic ulcer of right midfoot associated with type 2 DM 2021   • Difficulty walking    • Essential hypertension 2021   • Hammertoe    • Hyperlipidemia LDL goal <100 2021   • Ingrown toenail    • Obesity    • Paroxysmal atrial fibrillation 2021   • Polyneuropathy    • Pressure ulcer, stage 1    • Tinea unguium    • Type 2 " diabetes mellitus with polyneuropathy        Past Surgical History:   Procedure Laterality Date   • CYST REMOVAL      center of back; benign   • INCISION AND DRAINAGE ABSCESS      back   • OTHER SURGICAL HISTORY      Surgical clips left foot   • TOE SURGERY Right     Removal of 5th toe   • WRIST SURGERY Left     repair of injury       Family History: family history includes Cancer in his father; Heart disease in his brother, father, and mother. Otherwise pertinent FHx was reviewed and not pertinent to current issue.    Social History:  reports that he quit smoking about 30 years ago. He smoked 0.00 packs per day for 1.00 year. He has never used smokeless tobacco. He reports current alcohol use. He reports current drug use. Drug: Marijuana.    Home Medications:  alfuzosin, apixaban, buPROPion XL, citalopram, glucose blood, insulin aspart, insulin detemir, lisinopril, metFORMIN, metoprolol succinate XL, sertraline, and simvastatin      Allergies:  Allergies   Allergen Reactions   • Adhesive Tape Rash       Objective   Objective     Vitals:   Temp:  [97.1 °F (36.2 °C)-98.6 °F (37 °C)] 98.6 °F (37 °C)  Heart Rate:  [61-74] 73  Resp:  [16-18] 18  BP: (105-110)/(52-69) 105/52  Physical Exam      GEN:   A&Ox3, Patient seen at bedside in no apparent distress.    Physical Exam  Constitutional:       General: He is not in acute distress.     Appearance: He is ill-appearing.   Cardiovascular:      Pulses:           Dorsalis pedis pulses are 1+ on the right side and 1+ on the left side.        Posterior tibial pulses are 1+ on the right side and 1+ on the left side.   Feet:      Right foot:      Protective Sensation: 0 sites sensed.      Skin integrity: Ulcer present.      Left foot:      Protective Sensation: 0 sites sensed.      Skin integrity: Ulcer present.         Foot/Ankle Exam:       General:   Diabetic Foot Exam Performed    Appearance comment:  Morbidly obese  Orientation: AAOx3    Affect: appropriate      VASCULAR       Right Foot Vascularity   Dorsalis pedis:  1+  Posterior tibial:  1+  Skin Temperature: cool    Edema Grading:  None  CFT:  3  Pedal Hair Growth:  Absent  Varicosities: none       Left Foot Vascularity   Dorsalis pedis:  1+  Posterior tibial:  1+  Skin Temperature: cool    Edema Grading:  None  CFT:  3  Pedal Hair Growth:  Absent  Varicosities: none        NEUROLOGIC     Right Foot Neurologic   Light touch sensation:  Absent  Vibratory sensation:  Absent  Hot/Cold sensation: absent    Protective Sensation using [place] Monofilament:  0     Left Foot Neurologic   Light touch sensation:  Absent  Vibratory sensation:  Absent  Hot/cold sensation: absent    Protective Sensation using [place] Monofilament:  0     MUSCULOSKELETAL      Right Foot Musculoskeletal    Amputation   Right toes amputated: Yes    Toes amputated: fifth toe and second toe     DERMATOLOGIC     Right Foot Dermatologic   Skin: ulcer       Left Foot Dermatologic   Skin: ulcer        Right Foot Additional Comments Grade 4 ulcerations on plantar forefoot which grossly probes to bone with surrounding necrotic tissue.  Malodorous.  Local edema and erythema.  No fluctuance.  No lymphangitis.      Left Foot Additional Comments: Plantar left heel.  Granular tissue present.  No probing to bone.  No signs of edema, erythema, lymphangitis, nor signs of infection.      XR Foot 3+ View Right    Result Date: 12/1/2021  Narrative: PROCEDURE: XR FOOT 3+ VW RIGHT  COMPARISON: None  INDICATIONS: Suspected osteomyelitis of right forefoot  FINDINGS:  There is chronic dislocation at the 4th metatarsal-phalangeal joint as previously described.  There is now bony destruction at the it head of the 4th metatarsal suspicious for osteomyelitis.  This is superimposed on postsurgical changes from previous amputations at the 2nd and 3rd metatarsal as well as the proximal shaft of the 5th metatarsal.  There is chronic erosion and destructive change of the  distal proximal 3rd phalanx.  The appearance is similar to the study from 11/5/2020.  There is diffuse bone demineralization.  An acute fracture is not demonstrated.  There is a 1.3 cm linear radiodense foreign body in the plantar soft tissues adjacent to the calcaneus as previously described, unchanged.  CONCLUSION: 1. There are destructive changes at the head of the 4th metatarsal and soft tissue air suspicious for osteomyelitis.  This finding is new since 11/5/2020.  2. There are extensive postsurgical changes from previous amputations, unchanged since the 11/5/2020 study. 3. There is a linear radiopaque foreign body in the plantar soft tissues adjacent to the calcaneus, unchanged.     ADARSH HARRIS DO       Electronically Signed and Approved By: ADARSH HARRIS DO on 12/01/2021 at 17:57             MRI of right foot ordered and pending.    AELE Studies ordered and pending.    Result Review    Result Review:  I have personally reviewed the results from the time of this admission to 12/1/2021 19:38 EST and agree with these findings:  [x]  Laboratory  []  Microbiology  [x]  Radiology  []  EKG/Telemetry   []  Cardiology/Vascular   []  Pathology  []  Old records  []  Other:      WBC   Date Value Ref Range Status   12/01/2021 7.19 3.40 - 10.80 10*3/mm3 Final     RBC   Date Value Ref Range Status   12/01/2021 4.81 4.14 - 5.80 10*6/mm3 Final     Hemoglobin   Date Value Ref Range Status   12/01/2021 13.9 13.0 - 17.7 g/dL Final     Hematocrit   Date Value Ref Range Status   12/01/2021 41.3 37.5 - 51.0 % Final     MCV   Date Value Ref Range Status   12/01/2021 85.9 79.0 - 97.0 fL Final     MCH   Date Value Ref Range Status   12/01/2021 28.9 26.6 - 33.0 pg Final     MCHC   Date Value Ref Range Status   12/01/2021 33.7 31.5 - 35.7 g/dL Final     RDW   Date Value Ref Range Status   12/01/2021 14.7 12.3 - 15.4 % Final     RDW-SD   Date Value Ref Range Status   12/01/2021 46.0 37.0 - 54.0 fl Final     MPV   Date Value Ref Range  Status   12/01/2021 10.7 6.0 - 12.0 fL Final     Platelets   Date Value Ref Range Status   12/01/2021 100 (L) 140 - 450 10*3/mm3 Final     Neutrophil %   Date Value Ref Range Status   12/01/2021 79.7 (H) 42.7 - 76.0 % Final     Lymphocyte %   Date Value Ref Range Status   12/01/2021 11.8 (L) 19.6 - 45.3 % Final     Monocyte %   Date Value Ref Range Status   12/01/2021 7.0 5.0 - 12.0 % Final     Eosinophil %   Date Value Ref Range Status   12/01/2021 0.8 0.3 - 6.2 % Final     Basophil %   Date Value Ref Range Status   12/01/2021 0.3 0.0 - 1.5 % Final     Immature Grans %   Date Value Ref Range Status   12/01/2021 0.4 0.0 - 0.5 % Final     Neutrophils, Absolute   Date Value Ref Range Status   12/01/2021 5.73 1.70 - 7.00 10*3/mm3 Final     Lymphocytes, Absolute   Date Value Ref Range Status   12/01/2021 0.85 0.70 - 3.10 10*3/mm3 Final     Monocytes, Absolute   Date Value Ref Range Status   12/01/2021 0.50 0.10 - 0.90 10*3/mm3 Final     Eosinophils, Absolute   Date Value Ref Range Status   12/01/2021 0.06 0.00 - 0.40 10*3/mm3 Final     Basophils, Absolute   Date Value Ref Range Status   12/01/2021 0.02 0.00 - 0.20 10*3/mm3 Final     Immature Grans, Absolute   Date Value Ref Range Status   12/01/2021 0.03 0.00 - 0.05 10*3/mm3 Final     nRBC   Date Value Ref Range Status   12/01/2021 0.0 0.0 - 0.2 /100 WBC Final        Lab Results   Component Value Date    GLUCOSE 143 (H) 12/01/2021    BUN 9 12/01/2021    CREATININE 0.69 (L) 12/01/2021    EGFRIFNONA 116 12/01/2021    BCR 13.0 12/01/2021    K 4.5 12/01/2021    CO2 23.9 12/01/2021    CALCIUM 9.1 12/01/2021    ALBUMIN 3.60 12/01/2021    LABIL2 1.3 (L) 08/07/2019    AST 48 (H) 12/01/2021    ALT 41 12/01/2021             Most notable findings include: Chronic nonhealing ulcerations on the right forefoot.    Assessment/Plan   Assessment / Plan       Active Hospital Problems:  Active Hospital Problems    Diagnosis    • Cellulitis and abscess of foot    • Lactic acidosis    •  Essential hypertension    • Diabetic ulcer of right midfoot associated with type 2 DM    • Diabetic ulcer of left heel associated with type 2 DM        Patient Active Problem List   Diagnosis   • Diabetic ulcer of left heel associated with type 2 DM   • Acute osteomyelitis of left calcaneus    • Diabetic ulcer of left heel associated with type 2 DM   • Diabetic ulcer of right midfoot associated with type 2 DM   • Paroxysmal atrial fibrillation   • Essential hypertension   • Hyperlipidemia LDL goal <100   • Cellulitis and abscess of foot   • Lactic acidosis         Plan:     Upon completion of the physical exam , findings were discussed with the patient.    Recommendation: Right transmetatarsal amputation.    The risks and benefits of the surgery were discussed with the patient.  This discussion included possible complications of requiring further surgery, possible delayed wound healing, further surgery requiring amputation of the foot or leg, and also included the complication of death.  All the patient's questions were answered to their satisfaction.  Patient states they would like to proceed with the procedure.    Upon discussion of non-surgical conservative option, surgical correction, post-operative requirements along with risk and benefits of the surgery along with expected outcomes, the patient states they would like to proceed with the scheduling surgery.    The surgery is planned for the evening of 2 December 2021.    DVT prophylaxis:  Medical DVT prophylaxis orders are present.    CODE STATUS:    Code Status (Patient has no pulse and is not breathing): CPR (Attempt to Resuscitate)  Medical Interventions (Patient has pulse or is breathing): Full Support      Electronically signed by Ash Leyva DPM, 12/01/21, 7:31 PM EST.

## 2021-12-02 NOTE — H&P (VIEW-ONLY)
"[facility] [place]   [place]ISTORY AND PHYSICAL    Patient Name: Jose Shaikh  : 1960  MRN: [id number]  Primary Care Physician:  Corazon Gr MD  Date of admission: 2021    Subjective   Subjective     Chief Complaint: Worsening ulceration on plantar right forefoot    HPI:  Jose Shaikh is a 61 y.o. male admitted directly from wound care center for nonhealing wound on the right fourth toe and heel.  Patient has ongoing issues with nonhealing diabetic ulcers, getting hyperbaric treatment, patient was seen by physician Dr. Daniels in the wound care center for several days, had lanced wound on the toe, despite oral antibiotics he was not getting better.  She recommended admission for possible sepsis and wound infection.  She also contacted podiatrist Dr. Leyva for surgical consideration.      Patient complains of nonhealing wound on right forefoot.  He states his left heel wound is stable and slowly improving.    Patient states his blood sugars are \"good\" at home.    Patient denies any fevers, chills, nausea, vomiting, shortness of breathe, nor any other constitutional signs nor symptoms.    Review of Systems   All systems were reviewed and negative except for: Nonhealing ulcerations bilaterally    Personal History     Past Medical History:   Diagnosis Date   • Absence of toe of right foot    • Acute osteomyelitis of left calcaneus  2021   • Anxiety and depression    • Arthritis    • Claustrophobia    • Corns and callus    • Diabetic ulcer of left heel associated with type 2 DM 2021   • Diabetic ulcer of left heel associated with type 2 DM 2021   • Diabetic ulcer of right midfoot associated with type 2 DM 2021   • Difficulty walking    • Essential hypertension 2021   • Hammertoe    • Hyperlipidemia LDL goal <100 2021   • Ingrown toenail    • Obesity    • Paroxysmal atrial fibrillation 2021   • Polyneuropathy    • Pressure ulcer, stage 1    • Tinea unguium    • Type 2 " diabetes mellitus with polyneuropathy        Past Surgical History:   Procedure Laterality Date   • CYST REMOVAL      center of back; benign   • INCISION AND DRAINAGE ABSCESS      back   • OTHER SURGICAL HISTORY      Surgical clips left foot   • TOE SURGERY Right     Removal of 5th toe   • WRIST SURGERY Left     repair of injury       Family History: family history includes Cancer in his father; Heart disease in his brother, father, and mother. Otherwise pertinent FHx was reviewed and not pertinent to current issue.    Social History:  reports that he quit smoking about 30 years ago. He smoked 0.00 packs per day for 1.00 year. He has never used smokeless tobacco. He reports current alcohol use. He reports current drug use. Drug: Marijuana.    Home Medications:  alfuzosin, apixaban, buPROPion XL, citalopram, glucose blood, insulin aspart, insulin detemir, lisinopril, metFORMIN, metoprolol succinate XL, sertraline, and simvastatin      Allergies:  Allergies   Allergen Reactions   • Adhesive Tape Rash       Objective   Objective     Vitals:   Temp:  [97.1 °F (36.2 °C)-98.6 °F (37 °C)] 98.6 °F (37 °C)  Heart Rate:  [61-74] 73  Resp:  [16-18] 18  BP: (105-110)/(52-69) 105/52  Physical Exam      GEN:   A&Ox3, Patient seen at bedside in no apparent distress.    Physical Exam  Constitutional:       General: He is not in acute distress.     Appearance: He is ill-appearing.   Cardiovascular:      Pulses:           Dorsalis pedis pulses are 1+ on the right side and 1+ on the left side.        Posterior tibial pulses are 1+ on the right side and 1+ on the left side.   Feet:      Right foot:      Protective Sensation: 0 sites sensed.      Skin integrity: Ulcer present.      Left foot:      Protective Sensation: 0 sites sensed.      Skin integrity: Ulcer present.         Foot/Ankle Exam:       General:   Diabetic Foot Exam Performed    Appearance comment:  Morbidly obese  Orientation: AAOx3    Affect: appropriate      VASCULAR       Right Foot Vascularity   Dorsalis pedis:  1+  Posterior tibial:  1+  Skin Temperature: cool    Edema Grading:  None  CFT:  3  Pedal Hair Growth:  Absent  Varicosities: none       Left Foot Vascularity   Dorsalis pedis:  1+  Posterior tibial:  1+  Skin Temperature: cool    Edema Grading:  None  CFT:  3  Pedal Hair Growth:  Absent  Varicosities: none        NEUROLOGIC     Right Foot Neurologic   Light touch sensation:  Absent  Vibratory sensation:  Absent  Hot/Cold sensation: absent    Protective Sensation using [place] Monofilament:  0     Left Foot Neurologic   Light touch sensation:  Absent  Vibratory sensation:  Absent  Hot/cold sensation: absent    Protective Sensation using [place] Monofilament:  0     MUSCULOSKELETAL      Right Foot Musculoskeletal    Amputation   Right toes amputated: Yes    Toes amputated: fifth toe and second toe     DERMATOLOGIC     Right Foot Dermatologic   Skin: ulcer       Left Foot Dermatologic   Skin: ulcer        Right Foot Additional Comments Grade 4 ulcerations on plantar forefoot which grossly probes to bone with surrounding necrotic tissue.  Malodorous.  Local edema and erythema.  No fluctuance.  No lymphangitis.      Left Foot Additional Comments: Plantar left heel.  Granular tissue present.  No probing to bone.  No signs of edema, erythema, lymphangitis, nor signs of infection.      XR Foot 3+ View Right    Result Date: 12/1/2021  Narrative: PROCEDURE: XR FOOT 3+ VW RIGHT  COMPARISON: None  INDICATIONS: Suspected osteomyelitis of right forefoot  FINDINGS:  There is chronic dislocation at the 4th metatarsal-phalangeal joint as previously described.  There is now bony destruction at the it head of the 4th metatarsal suspicious for osteomyelitis.  This is superimposed on postsurgical changes from previous amputations at the 2nd and 3rd metatarsal as well as the proximal shaft of the 5th metatarsal.  There is chronic erosion and destructive change of the  distal proximal 3rd phalanx.  The appearance is similar to the study from 11/5/2020.  There is diffuse bone demineralization.  An acute fracture is not demonstrated.  There is a 1.3 cm linear radiodense foreign body in the plantar soft tissues adjacent to the calcaneus as previously described, unchanged.  CONCLUSION: 1. There are destructive changes at the head of the 4th metatarsal and soft tissue air suspicious for osteomyelitis.  This finding is new since 11/5/2020.  2. There are extensive postsurgical changes from previous amputations, unchanged since the 11/5/2020 study. 3. There is a linear radiopaque foreign body in the plantar soft tissues adjacent to the calcaneus, unchanged.     ADARSH HARRIS DO       Electronically Signed and Approved By: ADARSH HARRIS DO on 12/01/2021 at 17:57             MRI of right foot ordered and pending.    AELE Studies ordered and pending.    Result Review    Result Review:  I have personally reviewed the results from the time of this admission to 12/1/2021 19:38 EST and agree with these findings:  [x]  Laboratory  []  Microbiology  [x]  Radiology  []  EKG/Telemetry   []  Cardiology/Vascular   []  Pathology  []  Old records  []  Other:      WBC   Date Value Ref Range Status   12/01/2021 7.19 3.40 - 10.80 10*3/mm3 Final     RBC   Date Value Ref Range Status   12/01/2021 4.81 4.14 - 5.80 10*6/mm3 Final     Hemoglobin   Date Value Ref Range Status   12/01/2021 13.9 13.0 - 17.7 g/dL Final     Hematocrit   Date Value Ref Range Status   12/01/2021 41.3 37.5 - 51.0 % Final     MCV   Date Value Ref Range Status   12/01/2021 85.9 79.0 - 97.0 fL Final     MCH   Date Value Ref Range Status   12/01/2021 28.9 26.6 - 33.0 pg Final     MCHC   Date Value Ref Range Status   12/01/2021 33.7 31.5 - 35.7 g/dL Final     RDW   Date Value Ref Range Status   12/01/2021 14.7 12.3 - 15.4 % Final     RDW-SD   Date Value Ref Range Status   12/01/2021 46.0 37.0 - 54.0 fl Final     MPV   Date Value Ref Range  Status   12/01/2021 10.7 6.0 - 12.0 fL Final     Platelets   Date Value Ref Range Status   12/01/2021 100 (L) 140 - 450 10*3/mm3 Final     Neutrophil %   Date Value Ref Range Status   12/01/2021 79.7 (H) 42.7 - 76.0 % Final     Lymphocyte %   Date Value Ref Range Status   12/01/2021 11.8 (L) 19.6 - 45.3 % Final     Monocyte %   Date Value Ref Range Status   12/01/2021 7.0 5.0 - 12.0 % Final     Eosinophil %   Date Value Ref Range Status   12/01/2021 0.8 0.3 - 6.2 % Final     Basophil %   Date Value Ref Range Status   12/01/2021 0.3 0.0 - 1.5 % Final     Immature Grans %   Date Value Ref Range Status   12/01/2021 0.4 0.0 - 0.5 % Final     Neutrophils, Absolute   Date Value Ref Range Status   12/01/2021 5.73 1.70 - 7.00 10*3/mm3 Final     Lymphocytes, Absolute   Date Value Ref Range Status   12/01/2021 0.85 0.70 - 3.10 10*3/mm3 Final     Monocytes, Absolute   Date Value Ref Range Status   12/01/2021 0.50 0.10 - 0.90 10*3/mm3 Final     Eosinophils, Absolute   Date Value Ref Range Status   12/01/2021 0.06 0.00 - 0.40 10*3/mm3 Final     Basophils, Absolute   Date Value Ref Range Status   12/01/2021 0.02 0.00 - 0.20 10*3/mm3 Final     Immature Grans, Absolute   Date Value Ref Range Status   12/01/2021 0.03 0.00 - 0.05 10*3/mm3 Final     nRBC   Date Value Ref Range Status   12/01/2021 0.0 0.0 - 0.2 /100 WBC Final        Lab Results   Component Value Date    GLUCOSE 143 (H) 12/01/2021    BUN 9 12/01/2021    CREATININE 0.69 (L) 12/01/2021    EGFRIFNONA 116 12/01/2021    BCR 13.0 12/01/2021    K 4.5 12/01/2021    CO2 23.9 12/01/2021    CALCIUM 9.1 12/01/2021    ALBUMIN 3.60 12/01/2021    LABIL2 1.3 (L) 08/07/2019    AST 48 (H) 12/01/2021    ALT 41 12/01/2021             Most notable findings include: Chronic nonhealing ulcerations on the right forefoot.    Assessment/Plan   Assessment / Plan       Active Hospital Problems:  Active Hospital Problems    Diagnosis    • Cellulitis and abscess of foot    • Lactic acidosis    •  Essential hypertension    • Diabetic ulcer of right midfoot associated with type 2 DM    • Diabetic ulcer of left heel associated with type 2 DM        Patient Active Problem List   Diagnosis   • Diabetic ulcer of left heel associated with type 2 DM   • Acute osteomyelitis of left calcaneus    • Diabetic ulcer of left heel associated with type 2 DM   • Diabetic ulcer of right midfoot associated with type 2 DM   • Paroxysmal atrial fibrillation   • Essential hypertension   • Hyperlipidemia LDL goal <100   • Cellulitis and abscess of foot   • Lactic acidosis         Plan:     Upon completion of the physical exam , findings were discussed with the patient.    Recommendation: Right transmetatarsal amputation.    The risks and benefits of the surgery were discussed with the patient.  This discussion included possible complications of requiring further surgery, possible delayed wound healing, further surgery requiring amputation of the foot or leg, and also included the complication of death.  All the patient's questions were answered to their satisfaction.  Patient states they would like to proceed with the procedure.    Upon discussion of non-surgical conservative option, surgical correction, post-operative requirements along with risk and benefits of the surgery along with expected outcomes, the patient states they would like to proceed with the scheduling surgery.    The surgery is planned for the evening of 2 December 2021.    DVT prophylaxis:  Medical DVT prophylaxis orders are present.    CODE STATUS:    Code Status (Patient has no pulse and is not breathing): CPR (Attempt to Resuscitate)  Medical Interventions (Patient has pulse or is breathing): Full Support      Electronically signed by Ash Leyva DPM, 12/01/21, 7:31 PM EST.

## 2021-12-03 ENCOUNTER — APPOINTMENT (OUTPATIENT)
Dept: DIABETES SERVICES | Facility: HOSPITAL | Age: 61
End: 2021-12-03

## 2021-12-03 ENCOUNTER — TELEPHONE (OUTPATIENT)
Dept: WOUND CARE | Facility: HOSPITAL | Age: 61
End: 2021-12-03

## 2021-12-03 LAB
GLUCOSE BLDC GLUCOMTR-MCNC: 144 MG/DL (ref 70–99)
GLUCOSE BLDC GLUCOMTR-MCNC: 149 MG/DL (ref 70–99)
GLUCOSE BLDC GLUCOMTR-MCNC: 203 MG/DL (ref 70–99)
GLUCOSE BLDC GLUCOMTR-MCNC: 216 MG/DL (ref 70–99)

## 2021-12-03 PROCEDURE — 99024 POSTOP FOLLOW-UP VISIT: CPT | Performed by: PODIATRIST

## 2021-12-03 PROCEDURE — 25010000002 ENOXAPARIN PER 10 MG: Performed by: INTERNAL MEDICINE

## 2021-12-03 PROCEDURE — 94761 N-INVAS EAR/PLS OXIMETRY MLT: CPT

## 2021-12-03 PROCEDURE — 82962 GLUCOSE BLOOD TEST: CPT

## 2021-12-03 PROCEDURE — 25010000002 PIPERACILLIN SOD-TAZOBACTAM PER 1 G: Performed by: INTERNAL MEDICINE

## 2021-12-03 PROCEDURE — 63710000001 INSULIN LISPRO (HUMAN) PER 5 UNITS: Performed by: INTERNAL MEDICINE

## 2021-12-03 PROCEDURE — 94799 UNLISTED PULMONARY SVC/PX: CPT

## 2021-12-03 PROCEDURE — 63710000001 INSULIN DETEMIR PER 5 UNITS: Performed by: INTERNAL MEDICINE

## 2021-12-03 RX ADMIN — METOPROLOL SUCCINATE 50 MG: 50 TABLET, EXTENDED RELEASE ORAL at 08:26

## 2021-12-03 RX ADMIN — PIPERACILLIN SODIUM AND TAZOBACTAM SODIUM 4.5 G: 4; .5 INJECTION, POWDER, LYOPHILIZED, FOR SOLUTION INTRAVENOUS at 08:26

## 2021-12-03 RX ADMIN — BUPROPION HYDROCHLORIDE 150 MG: 150 TABLET, EXTENDED RELEASE ORAL at 08:25

## 2021-12-03 RX ADMIN — HYDROCODONE BITARTRATE AND ACETAMINOPHEN 1 TABLET: 10; 325 TABLET ORAL at 20:53

## 2021-12-03 RX ADMIN — INSULIN LISPRO 4 UNITS: 100 INJECTION, SOLUTION INTRAVENOUS; SUBCUTANEOUS at 08:27

## 2021-12-03 RX ADMIN — HYDROCODONE BITARTRATE AND ACETAMINOPHEN 1 TABLET: 10; 325 TABLET ORAL at 05:04

## 2021-12-03 RX ADMIN — ENOXAPARIN SODIUM 40 MG: 40 INJECTION SUBCUTANEOUS at 11:54

## 2021-12-03 RX ADMIN — PIPERACILLIN SODIUM AND TAZOBACTAM SODIUM 4.5 G: 4; .5 INJECTION, POWDER, LYOPHILIZED, FOR SOLUTION INTRAVENOUS at 23:23

## 2021-12-03 RX ADMIN — SODIUM CHLORIDE, PRESERVATIVE FREE 10 ML: 5 INJECTION INTRAVENOUS at 22:48

## 2021-12-03 RX ADMIN — CITALOPRAM HYDROBROMIDE 10 MG: 20 TABLET ORAL at 20:53

## 2021-12-03 RX ADMIN — INSULIN DETEMIR 25 UNITS: 100 INJECTION, SOLUTION SUBCUTANEOUS at 20:53

## 2021-12-03 RX ADMIN — SENNOSIDES AND DOCUSATE SODIUM 1 TABLET: 50; 8.6 TABLET ORAL at 08:25

## 2021-12-03 RX ADMIN — HYDROCODONE BITARTRATE AND ACETAMINOPHEN 1 TABLET: 10; 325 TABLET ORAL at 11:53

## 2021-12-03 RX ADMIN — INSULIN LISPRO 4 UNITS: 100 INJECTION, SOLUTION INTRAVENOUS; SUBCUTANEOUS at 11:53

## 2021-12-03 RX ADMIN — BUPROPION HYDROCHLORIDE 150 MG: 150 TABLET, EXTENDED RELEASE ORAL at 20:53

## 2021-12-03 RX ADMIN — ATORVASTATIN CALCIUM 10 MG: 10 TABLET, FILM COATED ORAL at 20:53

## 2021-12-03 RX ADMIN — PIPERACILLIN SODIUM AND TAZOBACTAM SODIUM 4.5 G: 4; .5 INJECTION, POWDER, LYOPHILIZED, FOR SOLUTION INTRAVENOUS at 14:25

## 2021-12-03 RX ADMIN — TAMSULOSIN HYDROCHLORIDE 0.4 MG: 0.4 CAPSULE ORAL at 20:53

## 2021-12-03 RX ADMIN — LISINOPRIL 20 MG: 20 TABLET ORAL at 08:26

## 2021-12-03 RX ADMIN — SODIUM CHLORIDE, PRESERVATIVE FREE 10 ML: 5 INJECTION INTRAVENOUS at 08:35

## 2021-12-03 RX ADMIN — PIPERACILLIN SODIUM AND TAZOBACTAM SODIUM 4.5 G: 4; .5 INJECTION, POWDER, LYOPHILIZED, FOR SOLUTION INTRAVENOUS at 00:58

## 2021-12-03 RX ADMIN — Medication: at 22:49

## 2021-12-03 NOTE — PROGRESS NOTES
[facility] - PODIATRY    Today's Date: 12/03/21    Patient Name: Jose Shaikh  MRN: [id number]  CSN: [id number]  PCP: Corazon Gr MD  Referring Provider: Yung Grijalva MD  Attending Provider: Yung Grijalva MD  Length of Stay: 2    SUBJECTIVE   Chief Complaint: Right foot osteomyelitis    HPI: Jose Shaikh, a 61 y.o.male,     Procedure: Right midfoot amputation  Date: 2 December 2021    Patient states they are doing well without complications.  Patient states they are following post-op instructions.  Patient states pain is controlled.      Patient denies any fevers, chills, nausea, vomiting, shortness of breathe, nor any other constitutional signs nor symptoms.                Past Medical History:   Diagnosis Date   • Absence of toe of right foot    • Acute osteomyelitis of left calcaneus  8/18/2021   • Anxiety and depression    • Arthritis    • Claustrophobia    • Corns and callus    • Diabetic ulcer of left heel associated with type 2 DM 8/18/2021   • Diabetic ulcer of left heel associated with type 2 DM 7/6/2021   • Diabetic ulcer of right midfoot associated with type 2 DM 8/18/2021   • Difficulty walking    • Essential hypertension 8/31/2021   • Hammertoe    • Hyperlipidemia LDL goal <100 8/31/2021   • Ingrown toenail    • Obesity    • Paroxysmal atrial fibrillation 8/31/2021   • Polyneuropathy    • Pressure ulcer, stage 1    • Tinea unguium    • Type 2 diabetes mellitus with polyneuropathy      Past Surgical History:   Procedure Laterality Date   • CYST REMOVAL      center of back; benign   • INCISION AND DRAINAGE ABSCESS      back   • OTHER SURGICAL HISTORY      Surgical clips left foot   • TOE SURGERY Right     Removal of 5th toe   • TRANS METATARSAL AMPUTATION Right 12/2/2021    Procedure: AMPUTATION TRANS METATARSAL;  Surgeon: Ash Leyva DPM;  Location: [facility] MAIN OR;  Service: Podiatry;  Laterality: Right;   • WRIST SURGERY Left     repair of injury     Family History    Problem Relation Age of Onset   • Heart disease Mother    • Heart disease Father    • Cancer Father         Unspecified   • Heart disease Brother      Social History     Socioeconomic History   • Marital status: Single   Tobacco Use   • Smoking status: Former Smoker     Packs/day: 0.00     Years: 1.00     Pack years: 0.00     Quit date: 1991     Years since quittin.2   • Smokeless tobacco: Never Used   • Tobacco comment: quit at age 30   Vaping Use   • Vaping Use: Never used   Substance and Sexual Activity   • Alcohol use: Yes     Comment: Rare   • Drug use: Yes     Types: Marijuana     Comment: Daily   • Sexual activity: Defer     Allergies   Allergen Reactions   • Adhesive Tape Rash     Current Facility-Administered Medications   Medication Dose Route Frequency Provider Last Rate Last Admin   • acetaminophen (TYLENOL) tablet 650 mg  650 mg Oral Q4H PRN Darryn Osullivan MD        Or   • acetaminophen (TYLENOL) suppository 650 mg  650 mg Rectal Q4H PRN Darrny Osullivan MD       • acetaminophen (TYLENOL) tablet 650 mg  650 mg Oral Q4H PRN Darryn Osullivan MD       • ALPRAZolam (XANAX) tablet 0.25 mg  0.25 mg Oral Q6H PRN Darryn Osullivan MD       • aluminum-magnesium hydroxide-simethicone (MAALOX MAX) 400-400-40 MG/5ML suspension 15 mL  15 mL Oral Q6H PRN Darryn Osullivan MD       • atorvastatin (LIPITOR) tablet 10 mg  10 mg Oral Nightly Darryn Osullivan MD   10 mg at 21 2339   • sennosides-docusate (PERICOLACE) 8.6-50 MG per tablet 1 tablet  1 tablet Oral BID Darryn Osullivan MD   1 tablet at 21 0825    And   • polyethylene glycol (MIRALAX) packet 17 g  17 g Oral Daily PRN Darryn Osullivan MD        And   • bisacodyl (DULCOLAX) EC tablet 5 mg  5 mg Oral Daily PRN Darryn Osullivan MD        And   • bisacodyl (DULCOLAX) suppository 10 mg  10 mg Rectal Daily PRN Darryn Osullivan MD       • buPROPion XL (WELLBUTRIN XL) 24 hr tablet 150 mg  150 mg Oral BID Darryn Osullivan MD   150 mg at 21 0825   •  citalopram (CeleXA) tablet 10 mg  10 mg Oral Daily Darryn Osullivan MD   10 mg at 12/02/21 2339   • dextrose (GLUTOSE) oral gel 15 g  15 g Oral Q15 Min PRN Darryn Osullivan MD       • dextrose 10 % infusion  25 g Intravenous Q15 Min PRN Darryn Osullivan MD       • enoxaparin (LOVENOX) syringe 40 mg  40 mg Subcutaneous Q12H Darryn Osullivan MD   40 mg at 12/01/21 2358   • famotidine (PEPCID) tablet 40 mg  40 mg Oral Daily Darryn Osullivan MD       • glucagon (human recombinant) (GLUCAGEN DIAGNOSTIC) injection 1 mg  1 mg Subcutaneous Q15 Min PRN Darryn Osullivan MD       • HYDROcodone-acetaminophen (NORCO)  MG per tablet 1 tablet  1 tablet Oral Q4H PRN Darryn Osullivan MD   1 tablet at 12/03/21 0504   • HYDROcodone-acetaminophen (NORCO) 5-325 MG per tablet 1 tablet  1 tablet Oral Q4H PRN Darryn Osullivan MD       • HYDROcodone-acetaminophen (NORCO) 5-325 MG per tablet 1 tablet  1 tablet Oral Q4H PRN Darryn Osullivan MD       • insulin detemir (LEVEMIR) injection 25 Units  25 Units Subcutaneous Nightly Darryn Osullivan MD   25 Units at 12/02/21 2338   • insulin lispro (humaLOG) injection 0-9 Units  0-9 Units Subcutaneous 4x Daily With Meals & Nightly Darryn Osullivan MD   4 Units at 12/03/21 0827   • lisinopril (PRINIVIL,ZESTRIL) tablet 20 mg  20 mg Oral Daily Darryn Osullivan MD   20 mg at 12/03/21 0826   • metoprolol succinate XL (TOPROL-XL) 24 hr tablet 50 mg  50 mg Oral Q24H Darryn Osullivan MD   50 mg at 12/03/21 0826   • morphine injection 2 mg  2 mg Intravenous Q2H PRN Darryn Osullivan MD       • naloxone (NARCAN) injection 0.4 mg  0.4 mg Intravenous Q5 Min PRN Darryn Osullivan MD       • ondansetron (ZOFRAN) injection 4 mg  4 mg Intravenous Q4H PRN Darryn Osullivan MD       • Pharmacy to Dose enoxaparin (LOVENOX)   Does not apply Continuous PRN Darryn Osullivan MD       • Pharmacy to Dose Zosyn   Does not apply Continuous PRN Darryn Osullivan MD       • piperacillin-tazobactam (ZOSYN) 4.5 g/100 mL 0.9% NS IVPB (mbp)  4.5 g  Intravenous Q8H Darryn Osullivan MD   4.5 g at 12/03/21 0826   • sodium chloride 0.9 % flush 10 mL  10 mL Intravenous Q12H Darryn Osullivan MD   10 mL at 12/02/21 0836   • sodium chloride 0.9 % flush 10 mL  10 mL Intravenous PRN Darryn Osullivan MD       • Sodium Hypochlorite (DAKIN'S) 0.125 % topical solution 0.125% (quarter strength)   Topical Q12H Darryn Osullivan MD   Given at 12/02/21 0838   • tamsulosin (FLOMAX) 24 hr capsule 0.4 mg  0.4 mg Oral Nightly Darryn Osullivan MD   0.4 mg at 12/02/21 2339   • temazepam (RESTORIL) capsule 15 mg  15 mg Oral Nightly PRN Darryn Osullivan MD         Review of Systems    OBJECTIVE     Vitals:    12/03/21 0825   BP:    Pulse: 67   Resp:    Temp:    SpO2:        PHYSICAL EXAM    GEN:   A&Ox3, NAD. Pt presents in hospital bed.     Patient's nurse present.    Neurovascular status unchanged    Ortho: Right midfoot amputation    Right foot shows dressing is dry and intact minimal signs of breakthrough. Right midfoot shows sutures intact with skin edges well-coapted with no signs of dehiscence.  Healthy surgical skin edges.  No drainage present.  No edema, erythema, calor, lymphangitis, nor signs of infection seen.      RADIOLOGY/NUCLEAR:  XR Foot 2 View Right    Result Date: 12/2/2021  Narrative: PROCEDURE: XR FOOT 2 VW RIGHT  COMPARISON: [facility], CR, XR FOOT 3+ VW RIGHT, 12/01/2021, 16:17.  INDICATIONS: POST-OPERATIVE AMPUTATION RIGHT TRANSMETATARSALS.  FINDINGS: Two views of the right foot were obtained.  The patient has undergone transmetatarsal amputation (TMA) of the right foot.  A bandage/dressing is in place, obscuring detail.  Soft tissue swelling and subcutaneous emphysema are seen at the site of the soft tissue stump and are expected after such surgery.  A chronic retained foreign body is identified along the medial plantar aspect of the right hindfoot, seen previously.  CONCLUSION: The patient has undergone transmetatarsal amputation of the right foot, as  discussed.     MALIK BELLA JR, MD       Electronically Signed and Approved By: MALIK BELLA JR, MD on 12/02/2021 at 23:13             LABORATORY/CULTURE RESULTS:  Results from last 7 days   Lab Units 12/01/21  1204   WBC 10*3/mm3 7.19   HEMOGLOBIN g/dL 13.9   HEMATOCRIT % 41.3   PLATELETS 10*3/mm3 100*     Results from last 7 days   Lab Units 12/01/21  1204   SODIUM mmol/L 134*   POTASSIUM mmol/L 4.5   CHLORIDE mmol/L 101   CO2 mmol/L 23.9   BUN mg/dL 9   CREATININE mg/dL 0.69*   CALCIUM mg/dL 9.1   BILIRUBIN mg/dL 0.8   ALK PHOS U/L 197*   ALT (SGPT) U/L 41   AST (SGOT) U/L 48*   GLUCOSE mg/dL 143*         Microbiology Results (last 10 days)     ** No results found for the last 240 hours. **           ASSESSMENT/PLAN     Patient Active Problem List   Diagnosis   • Diabetic ulcer of left heel associated with type 2 DM   • Acute osteomyelitis of left calcaneus    • Diabetic ulcer of left heel associated with type 2 DM   • Diabetic ulcer of right midfoot associated with type 2 DM   • Paroxysmal atrial fibrillation   • Essential hypertension   • Hyperlipidemia LDL goal <100   • Cellulitis and abscess of foot   • Lactic acidosis       Comprehensive lower extremity examination and evaluation was performed.    Discussed findings and treatment plan including risks, benefits, and treatment options with patient in detail. Patient agreed with treatment plan.    Orders written for:  -Dressing changes  -Physical therapy evaluate and treat for strict nonweightbearing to right foot  -Out of bed side commode and chair as needed    Recommend:  -Rehabilitation placement for at least 1 month    Cussed with patient's nurse.      Lab Frequency Next Occurrence   Lipid Panel Once 02/27/2022   WOUND CARE HYPERBARIC weekdays        This document has been electronically signed by Ash Leyva DPM on December 3, 2021 09:15 EST

## 2021-12-03 NOTE — OP NOTE
"PREOPERATIVE DIAGNOSIS: Osteomyelitis, Right forefoot.     POSTOPERATIVE DIAGNOSIS: Osteomyelitis, Right forefoot.     PROCEDURE PERFORMED: Right transmetatarsal amputation; CPT:  05365    ANESTHESIA: General.     HEMOSTASIS:Well-padded pneumatic calf tourniquet to 250 mmHg x 23\"    ESTIMATED BLOOD LOSS: 250mL.     SPECIMEN:  Amputated bone and soft tissue from the Right foot.     DRAIN: none     COMPLICATIONS: none     PREOPERATIVE MEDICATIONS: Scheduled IV antibiotics.     SUTURES: 2-0 Vicryl and 2-0 nylon     INJECTABLES:  20 mL of 0.25% Marcaine plain    DESCRIPTION OF PROCEDURE: Written consent was obtained from the patient prior to any medication or sedation being administered.     Under mild sedation the patient was brought to the operating room and placed on the operating table in the supine position.  General anesthesia was administered by the Anesthesia Department.      A well-padded pneumatic calf tourniquet was placed about the Right calf.    An ankle block was administered with 20 cc of 0.25% Marcaine plain.    The foot was then scrubbed, prepped and draped in the usual aseptic manner.     Attention was directed to the Right forefoot.  With use of a #10 blade scalpel and bovie scalpel, a fish-mouth incision was made to allow for transmetatarsal amputation.  Incision was deepened through subcutaneous tissues using sharp and blunt dissection.  Care was taken to identify and retract all vital neural and vascular structures.  All bleeders were ligated and cauterized as necessary.      Disarticulation was performed at the Lisfranc's joint and continued across the cuneiform and cuboid joints associated with the metatarsal bases.    The tourniquet was deflated with prompt hyperemic response seen to plantar and dorsal surgical skin flaps, with no active bleeding seen with a total tourniquet time of 23\".    The wound was flushed with copious amounts of NSS.  Bone wax was applied to osteotomy sites.    Micha was " applied to using areas of the wound bed.  Subcutaneous tissues reapproximated coapted utilizing 2-0 Vicryl.  The skin edges were reapproximated and coapted utilizing 2-0 and 3-0 nylon using simple interrupted suture technique to close the wound.     Capillary fill time was assessed to the surgical skin edges and the dorsal and plantar surgical flaps noted to be immediate.    A postoperative dressing consisting of Adaptic, 4 x 4's, ABD, Kerlix and Ace wrap were then placed about the Right foot and ankle.     The surgery was performed with Arturo Evangelista RN, First Assist.  He performed as a first assist throughout the entire case with retracting, fixation, suturing, and postoperative dressing.    The patient tolerated the procedure and anesthesia well.  The patient was transferred to the recovery room with vital signs stable and vascular status intact to the surgical site on the Right foot. Following a period of postoperative monitoring the patient will be discharged to his inpatient room and followed accordingly.

## 2021-12-03 NOTE — SIGNIFICANT NOTE
12/03/21 1503   Plan   Plan SW followed up with pt regarding discharge planning. Pt is requesting to go to inpt rehab before returning home. Pt is agreeable to local referrals. SW sent referrals. SW will follow up with pt if bed offers are available.

## 2021-12-03 NOTE — PLAN OF CARE
Goal Outcome Evaluation:      Patient resting comfortably in bed, medicated for pain once during shift, VSS, patient seeking rehab placement, dressing to LLE changed per orders.

## 2021-12-03 NOTE — PROGRESS NOTES
[facility]     Progress Note    Patient Name: Jose Shaikh  : 1960  MRN: [id number]  Primary Care Physician:  Corazon Gr MD  Date of admission: 2021      Subjective   Brief summary.  Patient admitted with diabetic foot ulcer, failed outpatient treatment      HPI:  Patient seen and examined earlier this morning.  Awake alert feeling good.  Imaging studies pending.  Rest of ultrasound and Doppler pending  No fever chills    Review of Systems     Fatigue.  No hypo or hyperglycemia.  Pain in foot.  No drainage.  No fever chills      Objective     Vitals:   Temp:  [97.9 °F (36.6 °C)-98.9 °F (37.2 °C)] 98.2 °F (36.8 °C)  Heart Rate:  [60-73] 60  Resp:  [16-18] 18  BP: ()/(56-67) 91/56    Physical Exam :     Elderly male not in acute distress.  Heart regular and lungs clear.  Abdomen obese.  Right foot essentially unchanged.  Slight edema noted      Result Review:  I have personally reviewed the results from the time of this admission to 2021 19:37 EST and agree with these findings:  [x]  Laboratory  []  Microbiology  []  Radiology  []  EKG/Telemetry   []  Cardiology/Vascular   []  Pathology  []  Old records  []  Other:           Assessment / Plan       Active Hospital Problems:  Active Hospital Problems    Diagnosis    • Cellulitis and abscess of foot    • Lactic acidosis    • Essential hypertension    • Diabetic ulcer of right midfoot associated with type 2 DM    • Diabetic ulcer of left heel associated with type 2 DM        Plan:   Admitted for failed outpatient treatment of cellulitis and possible osteo-.  MRI results pending.  Continue antibiotics.  Vascular studies ordered.  Podiatry seen patient for possible surgery today post results.  Continue current treatment         DVT prophylaxis:  Medical DVT prophylaxis orders are present.    CODE STATUS:   Code Status (Patient has no pulse and is not breathing): CPR (Attempt to Resuscitate)  Medical Interventions (Patient has pulse or  is breathing): Full Support            Electronically signed by Yung Grijalva MD, 12/02/21, 7:37 PM EST.

## 2021-12-03 NOTE — PLAN OF CARE
Goal Outcome Evaluation:   Patient received right metatarsal amputation. Surgical dressing in place. Dressing present monitoring closely. Limb elevated on pillows. Pain controlled. Vitals stable.

## 2021-12-03 NOTE — PROGRESS NOTES
[facility]     Progress Note    Patient Name: Jose Shaikh  : 1960  MRN: [id number]  Primary Care Physician:  Corazon Gr MD  Date of admission: 2021      Subjective   Brief summary.  Patient admitted with nonhealing ulcer and osteomyelitis      HPI:  Patient postop, status post right midfoot amputation, postop day #1.  Awake alert.  Not much complaints of pain.  Some phantom pain noted.  Blood sugar stable    Review of Systems     No chest pain.  No fever chills.  No shortness of breath      Objective     Vitals:   Temp:  [97.2 °F (36.2 °C)-98.6 °F (37 °C)] 98.6 °F (37 °C)  Heart Rate:  [58-77] 68  Resp:  [14-20] 18  BP: ()/(48-70) 94/58  Flow (L/min):  [2] 2  FiO2 (%):  [1 %] 1 %    Physical Exam :     Elderly male looks of his stated age, not in acute distress.  Heart regular and lungs clear.  Abdomen obese.  Right foot in surgical dressing      Result Review:  I have personally reviewed the results from the time of this admission to 12/3/2021 17:14 EST and agree with these findings:  [x]  Laboratory  []  Microbiology  []  Radiology  []  EKG/Telemetry   []  Cardiology/Vascular   []  Pathology  []  Old records  []  Other:           Assessment / Plan       Active Hospital Problems:  Active Hospital Problems    Diagnosis    • Cellulitis and abscess of foot    • Lactic acidosis    • Essential hypertension    • Diabetic ulcer of right midfoot associated with type 2 DM    • Diabetic ulcer of left heel associated with type 2 DM        Plan:   Status post right midfoot amputation, postop day #1.  Stable and improving.  Continue antibiotics and current coverage.  Wound care per Dr. Leyva recommendation.  Increase activity with assistance, no weightbearing on right foot  Social service consult for discharge planning       DVT prophylaxis:  Medical DVT prophylaxis orders are present.    CODE STATUS:   Code Status (Patient has no pulse and is not breathing): CPR (Attempt to  Resuscitate)  Medical Interventions (Patient has pulse or is breathing): Full Support            Electronically signed by Yung Grijalva MD, 12/03/21, 5:14 PM EST.

## 2021-12-03 NOTE — ANESTHESIA POSTPROCEDURE EVALUATION
Patient: Jose Shaikh    Procedure Summary     Date: 12/02/21 Room / Location: [facility] OR 03 / [facility] MAIN OR    Anesthesia Start: 1931 Anesthesia Stop: 2128    Procedure: AMPUTATION TRANS METATARSAL (Right Foot) Diagnosis:       Diabetic ulcer of right midfoot associated with type 2 diabetes mellitus, with muscle involvement without evidence of necrosis (HCC)      (Diabetic ulcer of right midfoot associated with type 2 diabetes mellitus, with muscle involvement without evidence of necrosis (HCC) [E11.621, L97.415])    Surgeons: Ash Leyva DPM Provider:     Anesthesia Type: general, MAC ASA Status: 4          Anesthesia Type: general, MAC    Vitals  Vitals Value Taken Time   /55 12/02/21 2200   Temp 36.2 °C (97.2 °F) 12/02/21 2158   Pulse 65 12/02/21 2203   Resp 20 12/02/21 2158   SpO2 97 % 12/02/21 2203   Vitals shown include unvalidated device data.        Post Anesthesia Care and Evaluation    Patient location during evaluation: bedside  Patient participation: complete - patient participated  Level of consciousness: awake  Pain score: 0  Pain management: adequate  Airway patency: patent  Anesthetic complications: No anesthetic complications  PONV Status: none  Cardiovascular status: acceptable and stable  Respiratory status: acceptable and room air  Hydration status: acceptable    Comments: An Anesthesiologist personally participated in the most demanding procedures (including induction and emergence if applicable) in the anesthesia plan, monitored the course of anesthesia administration at frequent intervals and remained physically present and available for immediate diagnosis and treatment of emergencies.

## 2021-12-04 LAB
GLUCOSE BLDC GLUCOMTR-MCNC: 138 MG/DL (ref 70–99)
GLUCOSE BLDC GLUCOMTR-MCNC: 142 MG/DL (ref 70–99)
GLUCOSE BLDC GLUCOMTR-MCNC: 220 MG/DL (ref 70–99)
GLUCOSE BLDC GLUCOMTR-MCNC: 233 MG/DL (ref 70–99)

## 2021-12-04 PROCEDURE — 25010000002 ENOXAPARIN PER 10 MG: Performed by: INTERNAL MEDICINE

## 2021-12-04 PROCEDURE — 94799 UNLISTED PULMONARY SVC/PX: CPT

## 2021-12-04 PROCEDURE — 63710000001 INSULIN LISPRO (HUMAN) PER 5 UNITS: Performed by: INTERNAL MEDICINE

## 2021-12-04 PROCEDURE — 82962 GLUCOSE BLOOD TEST: CPT

## 2021-12-04 PROCEDURE — 25010000002 MORPHINE PER 10 MG: Performed by: INTERNAL MEDICINE

## 2021-12-04 PROCEDURE — 63710000001 INSULIN DETEMIR PER 5 UNITS: Performed by: INTERNAL MEDICINE

## 2021-12-04 PROCEDURE — 25010000002 PIPERACILLIN SOD-TAZOBACTAM PER 1 G: Performed by: INTERNAL MEDICINE

## 2021-12-04 RX ORDER — PREGABALIN 25 MG/1
25 CAPSULE ORAL EVERY 12 HOURS SCHEDULED
Status: DISCONTINUED | OUTPATIENT
Start: 2021-12-04 | End: 2021-12-21 | Stop reason: HOSPADM

## 2021-12-04 RX ADMIN — PIPERACILLIN SODIUM AND TAZOBACTAM SODIUM 4.5 G: 4; .5 INJECTION, POWDER, LYOPHILIZED, FOR SOLUTION INTRAVENOUS at 21:17

## 2021-12-04 RX ADMIN — INSULIN LISPRO 4 UNITS: 100 INJECTION, SOLUTION INTRAVENOUS; SUBCUTANEOUS at 11:40

## 2021-12-04 RX ADMIN — SODIUM CHLORIDE, PRESERVATIVE FREE 10 ML: 5 INJECTION INTRAVENOUS at 21:19

## 2021-12-04 RX ADMIN — INSULIN DETEMIR 25 UNITS: 100 INJECTION, SOLUTION SUBCUTANEOUS at 21:20

## 2021-12-04 RX ADMIN — FAMOTIDINE 40 MG: 20 TABLET ORAL at 08:16

## 2021-12-04 RX ADMIN — PREGABALIN 25 MG: 25 CAPSULE ORAL at 11:40

## 2021-12-04 RX ADMIN — ENOXAPARIN SODIUM 40 MG: 40 INJECTION SUBCUTANEOUS at 11:45

## 2021-12-04 RX ADMIN — INSULIN LISPRO 4 UNITS: 100 INJECTION, SOLUTION INTRAVENOUS; SUBCUTANEOUS at 21:19

## 2021-12-04 RX ADMIN — MORPHINE SULFATE 2 MG: 2 INJECTION, SOLUTION INTRAMUSCULAR; INTRAVENOUS at 09:52

## 2021-12-04 RX ADMIN — BUPROPION HYDROCHLORIDE 150 MG: 150 TABLET, EXTENDED RELEASE ORAL at 08:16

## 2021-12-04 RX ADMIN — PREGABALIN 25 MG: 25 CAPSULE ORAL at 21:18

## 2021-12-04 RX ADMIN — HYDROCODONE BITARTRATE AND ACETAMINOPHEN 1 TABLET: 10; 325 TABLET ORAL at 05:29

## 2021-12-04 RX ADMIN — MORPHINE SULFATE 2 MG: 2 INJECTION, SOLUTION INTRAMUSCULAR; INTRAVENOUS at 03:33

## 2021-12-04 RX ADMIN — CITALOPRAM HYDROBROMIDE 10 MG: 20 TABLET ORAL at 21:18

## 2021-12-04 RX ADMIN — SENNOSIDES AND DOCUSATE SODIUM 1 TABLET: 50; 8.6 TABLET ORAL at 21:18

## 2021-12-04 RX ADMIN — ENOXAPARIN SODIUM 40 MG: 40 INJECTION SUBCUTANEOUS at 00:59

## 2021-12-04 RX ADMIN — LISINOPRIL 20 MG: 20 TABLET ORAL at 08:16

## 2021-12-04 RX ADMIN — HYDROCODONE BITARTRATE AND ACETAMINOPHEN 1 TABLET: 10; 325 TABLET ORAL at 21:18

## 2021-12-04 RX ADMIN — METOPROLOL SUCCINATE 50 MG: 50 TABLET, EXTENDED RELEASE ORAL at 08:16

## 2021-12-04 RX ADMIN — PIPERACILLIN SODIUM AND TAZOBACTAM SODIUM 4.5 G: 4; .5 INJECTION, POWDER, LYOPHILIZED, FOR SOLUTION INTRAVENOUS at 14:30

## 2021-12-04 RX ADMIN — PIPERACILLIN SODIUM AND TAZOBACTAM SODIUM 4.5 G: 4; .5 INJECTION, POWDER, LYOPHILIZED, FOR SOLUTION INTRAVENOUS at 05:29

## 2021-12-04 RX ADMIN — Medication: at 21:19

## 2021-12-04 RX ADMIN — HYDROCODONE BITARTRATE AND ACETAMINOPHEN 1 TABLET: 10; 325 TABLET ORAL at 09:29

## 2021-12-04 RX ADMIN — HYDROCODONE BITARTRATE AND ACETAMINOPHEN 1 TABLET: 10; 325 TABLET ORAL at 00:59

## 2021-12-04 RX ADMIN — Medication: at 08:16

## 2021-12-04 RX ADMIN — SENNOSIDES AND DOCUSATE SODIUM 1 TABLET: 50; 8.6 TABLET ORAL at 08:16

## 2021-12-04 RX ADMIN — BUPROPION HYDROCHLORIDE 150 MG: 150 TABLET, EXTENDED RELEASE ORAL at 21:18

## 2021-12-04 RX ADMIN — ATORVASTATIN CALCIUM 10 MG: 10 TABLET, FILM COATED ORAL at 21:18

## 2021-12-04 RX ADMIN — TAMSULOSIN HYDROCHLORIDE 0.4 MG: 0.4 CAPSULE ORAL at 21:18

## 2021-12-04 RX ADMIN — SODIUM CHLORIDE, PRESERVATIVE FREE 10 ML: 5 INJECTION INTRAVENOUS at 08:16

## 2021-12-04 NOTE — PROGRESS NOTES
[facility]     Progress Note    Patient Name: Jose Shaikh  : 1960  MRN: [id number]  Primary Care Physician:  Corazon Gr MD  Date of admission: 2021      Subjective   Brief summary.  Patient admitted with nonhealing ulcer and osteomyelitis of the right foot      HPI:  Follow-up on osteomyelitis and nonhealing ulcer.  Status post right midfoot amputation.  Patient continues to have pain issues, feels like somebody is twisting his foot.  No fever chills    Review of Systems     Blood sugars doing good, no fever chills, no nausea vomiting      Objective     Vitals:   Temp:  [98.2 °F (36.8 °C)-99.3 °F (37.4 °C)] 99.1 °F (37.3 °C)  Heart Rate:  [65-71] 65  Resp:  [18] 18  BP: ()/(46-68) 100/56    Physical Exam :     Elderly male not in acute distress  Heart regular lungs clear  Abdomen obese  But foot in surgical dressing      Result Review:  I have personally reviewed the results from the time of this admission to 2021 11:24 EST and agree with these findings:  [x]  Laboratory  []  Microbiology  []  Radiology  []  EKG/Telemetry   []  Cardiology/Vascular   []  Pathology  []  Old records  []  Other:           Assessment / Plan       Active Hospital Problems:  Active Hospital Problems    Diagnosis    • Cellulitis and abscess of foot    • Lactic acidosis    • Essential hypertension    • Diabetic ulcer of right midfoot associated with type 2 DM    • Diabetic ulcer of left heel associated with type 2 DM        Plan:   Improving and stable.  Blood sugars doing good  Check labs.  Add Lyrica for phantom pain.  Waiting for placement       DVT prophylaxis:  Medical DVT prophylaxis orders are present.    CODE STATUS:   Code Status (Patient has no pulse and is not breathing): CPR (Attempt to Resuscitate)  Medical Interventions (Patient has pulse or is breathing): Full Support            Electronically signed by Yung Grijalva MD, 21, 11:24 AM EST.

## 2021-12-04 NOTE — PLAN OF CARE
Goal Outcome Evaluation:  Plan of Care Reviewed With: patient        Progress: improving  Outcome Summary: Patient with complaints of pain this shift. Treated with PRN pain medication. Wound care performed per MD order. VS FILIBERTO

## 2021-12-05 LAB
ALBUMIN SERPL-MCNC: 3.1 G/DL (ref 3.5–5.2)
ALBUMIN/GLOB SERPL: 1 G/DL
ALP SERPL-CCNC: 195 U/L (ref 39–117)
ALT SERPL W P-5'-P-CCNC: 31 U/L (ref 1–41)
ANION GAP SERPL CALCULATED.3IONS-SCNC: 6.5 MMOL/L (ref 5–15)
AST SERPL-CCNC: 35 U/L (ref 1–40)
BASOPHILS # BLD AUTO: 0.03 10*3/MM3 (ref 0–0.2)
BASOPHILS NFR BLD AUTO: 0.4 % (ref 0–1.5)
BILIRUB SERPL-MCNC: 0.9 MG/DL (ref 0–1.2)
BUN SERPL-MCNC: 10 MG/DL (ref 8–23)
BUN/CREAT SERPL: 13.5 (ref 7–25)
CALCIUM SPEC-SCNC: 8.7 MG/DL (ref 8.6–10.5)
CHLORIDE SERPL-SCNC: 102 MMOL/L (ref 98–107)
CO2 SERPL-SCNC: 27.5 MMOL/L (ref 22–29)
CREAT SERPL-MCNC: 0.74 MG/DL (ref 0.76–1.27)
DEPRECATED RDW RBC AUTO: 48.6 FL (ref 37–54)
EOSINOPHIL # BLD AUTO: 0.04 10*3/MM3 (ref 0–0.4)
EOSINOPHIL NFR BLD AUTO: 0.5 % (ref 0.3–6.2)
ERYTHROCYTE [DISTWIDTH] IN BLOOD BY AUTOMATED COUNT: 14.9 % (ref 12.3–15.4)
GFR SERPL CREATININE-BSD FRML MDRD: 107 ML/MIN/1.73
GLOBULIN UR ELPH-MCNC: 3.2 GM/DL
GLUCOSE BLDC GLUCOMTR-MCNC: 131 MG/DL (ref 70–99)
GLUCOSE BLDC GLUCOMTR-MCNC: 204 MG/DL (ref 70–99)
GLUCOSE BLDC GLUCOMTR-MCNC: 218 MG/DL (ref 70–99)
GLUCOSE BLDC GLUCOMTR-MCNC: 301 MG/DL (ref 70–99)
GLUCOSE SERPL-MCNC: 126 MG/DL (ref 65–99)
HCT VFR BLD AUTO: 37.5 % (ref 37.5–51)
HGB BLD-MCNC: 12.1 G/DL (ref 13–17.7)
IMM GRANULOCYTES # BLD AUTO: 0.02 10*3/MM3 (ref 0–0.05)
IMM GRANULOCYTES NFR BLD AUTO: 0.3 % (ref 0–0.5)
LYMPHOCYTES # BLD AUTO: 1.32 10*3/MM3 (ref 0.7–3.1)
LYMPHOCYTES NFR BLD AUTO: 17.4 % (ref 19.6–45.3)
MCH RBC QN AUTO: 28.6 PG (ref 26.6–33)
MCHC RBC AUTO-ENTMCNC: 32.3 G/DL (ref 31.5–35.7)
MCV RBC AUTO: 88.7 FL (ref 79–97)
MONOCYTES # BLD AUTO: 0.9 10*3/MM3 (ref 0.1–0.9)
MONOCYTES NFR BLD AUTO: 11.9 % (ref 5–12)
NEUTROPHILS NFR BLD AUTO: 5.27 10*3/MM3 (ref 1.7–7)
NEUTROPHILS NFR BLD AUTO: 69.5 % (ref 42.7–76)
NRBC BLD AUTO-RTO: 0 /100 WBC (ref 0–0.2)
PLATELET # BLD AUTO: 107 10*3/MM3 (ref 140–450)
PMV BLD AUTO: 11.2 FL (ref 6–12)
POTASSIUM SERPL-SCNC: 4.5 MMOL/L (ref 3.5–5.2)
PROT SERPL-MCNC: 6.3 G/DL (ref 6–8.5)
RBC # BLD AUTO: 4.23 10*6/MM3 (ref 4.14–5.8)
SODIUM SERPL-SCNC: 136 MMOL/L (ref 136–145)
WBC NRBC COR # BLD: 7.58 10*3/MM3 (ref 3.4–10.8)

## 2021-12-05 PROCEDURE — 82962 GLUCOSE BLOOD TEST: CPT

## 2021-12-05 PROCEDURE — 99024 POSTOP FOLLOW-UP VISIT: CPT | Performed by: PODIATRIST

## 2021-12-05 PROCEDURE — 85025 COMPLETE CBC W/AUTO DIFF WBC: CPT | Performed by: INTERNAL MEDICINE

## 2021-12-05 PROCEDURE — 25010000002 MORPHINE PER 10 MG: Performed by: INTERNAL MEDICINE

## 2021-12-05 PROCEDURE — 63710000001 INSULIN LISPRO (HUMAN) PER 5 UNITS: Performed by: INTERNAL MEDICINE

## 2021-12-05 PROCEDURE — 97161 PT EVAL LOW COMPLEX 20 MIN: CPT

## 2021-12-05 PROCEDURE — 63710000001 INSULIN DETEMIR PER 5 UNITS: Performed by: INTERNAL MEDICINE

## 2021-12-05 PROCEDURE — 80053 COMPREHEN METABOLIC PANEL: CPT | Performed by: INTERNAL MEDICINE

## 2021-12-05 PROCEDURE — 99232 SBSQ HOSP IP/OBS MODERATE 35: CPT | Performed by: INTERNAL MEDICINE

## 2021-12-05 PROCEDURE — 25010000002 ENOXAPARIN PER 10 MG: Performed by: INTERNAL MEDICINE

## 2021-12-05 PROCEDURE — 25010000002 PIPERACILLIN SOD-TAZOBACTAM PER 1 G: Performed by: INTERNAL MEDICINE

## 2021-12-05 PROCEDURE — 94799 UNLISTED PULMONARY SVC/PX: CPT

## 2021-12-05 RX ORDER — METOPROLOL SUCCINATE 25 MG/1
25 TABLET, EXTENDED RELEASE ORAL
Status: DISCONTINUED | OUTPATIENT
Start: 2021-12-06 | End: 2021-12-21 | Stop reason: HOSPADM

## 2021-12-05 RX ADMIN — PREGABALIN 25 MG: 25 CAPSULE ORAL at 08:30

## 2021-12-05 RX ADMIN — ENOXAPARIN SODIUM 40 MG: 40 INJECTION SUBCUTANEOUS at 00:47

## 2021-12-05 RX ADMIN — ENOXAPARIN SODIUM 40 MG: 40 INJECTION SUBCUTANEOUS at 12:07

## 2021-12-05 RX ADMIN — Medication: at 08:31

## 2021-12-05 RX ADMIN — INSULIN LISPRO 4 UNITS: 100 INJECTION, SOLUTION INTRAVENOUS; SUBCUTANEOUS at 12:07

## 2021-12-05 RX ADMIN — TAMSULOSIN HYDROCHLORIDE 0.4 MG: 0.4 CAPSULE ORAL at 21:05

## 2021-12-05 RX ADMIN — SENNOSIDES AND DOCUSATE SODIUM 1 TABLET: 50; 8.6 TABLET ORAL at 21:05

## 2021-12-05 RX ADMIN — CITALOPRAM HYDROBROMIDE 10 MG: 20 TABLET ORAL at 21:05

## 2021-12-05 RX ADMIN — INSULIN LISPRO 7 UNITS: 100 INJECTION, SOLUTION INTRAVENOUS; SUBCUTANEOUS at 21:06

## 2021-12-05 RX ADMIN — SODIUM CHLORIDE, PRESERVATIVE FREE 10 ML: 5 INJECTION INTRAVENOUS at 21:05

## 2021-12-05 RX ADMIN — LISINOPRIL 20 MG: 20 TABLET ORAL at 08:30

## 2021-12-05 RX ADMIN — PIPERACILLIN SODIUM AND TAZOBACTAM SODIUM 4.5 G: 4; .5 INJECTION, POWDER, LYOPHILIZED, FOR SOLUTION INTRAVENOUS at 06:10

## 2021-12-05 RX ADMIN — HYDROCODONE BITARTRATE AND ACETAMINOPHEN 1 TABLET: 10; 325 TABLET ORAL at 13:21

## 2021-12-05 RX ADMIN — HYDROCODONE BITARTRATE AND ACETAMINOPHEN 1 TABLET: 10; 325 TABLET ORAL at 18:32

## 2021-12-05 RX ADMIN — BUPROPION HYDROCHLORIDE 150 MG: 150 TABLET, EXTENDED RELEASE ORAL at 21:05

## 2021-12-05 RX ADMIN — Medication: at 21:06

## 2021-12-05 RX ADMIN — ATORVASTATIN CALCIUM 10 MG: 10 TABLET, FILM COATED ORAL at 21:05

## 2021-12-05 RX ADMIN — METOPROLOL SUCCINATE 50 MG: 50 TABLET, EXTENDED RELEASE ORAL at 08:30

## 2021-12-05 RX ADMIN — BUPROPION HYDROCHLORIDE 150 MG: 150 TABLET, EXTENDED RELEASE ORAL at 08:30

## 2021-12-05 RX ADMIN — SODIUM CHLORIDE, PRESERVATIVE FREE 10 ML: 5 INJECTION INTRAVENOUS at 08:31

## 2021-12-05 RX ADMIN — PIPERACILLIN SODIUM AND TAZOBACTAM SODIUM 4.5 G: 4; .5 INJECTION, POWDER, LYOPHILIZED, FOR SOLUTION INTRAVENOUS at 13:22

## 2021-12-05 RX ADMIN — SENNOSIDES AND DOCUSATE SODIUM 1 TABLET: 50; 8.6 TABLET ORAL at 08:31

## 2021-12-05 RX ADMIN — FAMOTIDINE 40 MG: 20 TABLET ORAL at 08:30

## 2021-12-05 RX ADMIN — INSULIN LISPRO 4 UNITS: 100 INJECTION, SOLUTION INTRAVENOUS; SUBCUTANEOUS at 17:03

## 2021-12-05 RX ADMIN — PIPERACILLIN SODIUM AND TAZOBACTAM SODIUM 4.5 G: 4; .5 INJECTION, POWDER, LYOPHILIZED, FOR SOLUTION INTRAVENOUS at 22:57

## 2021-12-05 RX ADMIN — PREGABALIN 25 MG: 25 CAPSULE ORAL at 21:05

## 2021-12-05 RX ADMIN — MORPHINE SULFATE 2 MG: 2 INJECTION, SOLUTION INTRAMUSCULAR; INTRAVENOUS at 21:04

## 2021-12-05 RX ADMIN — INSULIN DETEMIR 25 UNITS: 100 INJECTION, SOLUTION SUBCUTANEOUS at 21:06

## 2021-12-05 NOTE — THERAPY EVALUATION
Acute Care - Physical Therapy Initial Evaluation  KEO Dominguez     Patient Name: Jose Shaikh  : 1960  MRN: [id number]  Today's Date: 2021      Visit Dx:   Admit date: 2021     Referring Physician: Yung Grijalva MD     Surgery Date:2021 - 2021   Procedure(s) (LRB):  AMPUTATION TRANS METATARSAL (Right)         ICD-10-CM ICD-9-CM   1. Diabetic ulcer of right midfoot associated with type 2 DM  E11.621 250.80    L97.415 707.14   2. Diabetic ulcer of right midfoot associated with type 2 diabetes mellitus, with muscle involvement without evidence of necrosis (HCC)  E11.621 250.80    L97.415 707.14   3. Difficulty walking  R26.2 719.7     Patient Active Problem List   Diagnosis   • Diabetic ulcer of left heel associated with type 2 DM   • Acute osteomyelitis of left calcaneus    • Diabetic ulcer of left heel associated with type 2 DM   • Diabetic ulcer of right midfoot associated with type 2 DM   • Paroxysmal atrial fibrillation   • Essential hypertension   • Hyperlipidemia LDL goal <100   • Cellulitis and abscess of foot   • Lactic acidosis     Past Medical History:   Diagnosis Date   • Absence of toe of right foot    • Acute osteomyelitis of left calcaneus  2021   • Anxiety and depression    • Arthritis    • Claustrophobia    • Corns and callus    • Diabetic ulcer of left heel associated with type 2 DM 2021   • Diabetic ulcer of left heel associated with type 2 DM 2021   • Diabetic ulcer of right midfoot associated with type 2 DM 2021   • Difficulty walking    • Essential hypertension 2021   • Hammertoe    • Hyperlipidemia LDL goal <100 2021   • Ingrown toenail    • Obesity    • Paroxysmal atrial fibrillation 2021   • Polyneuropathy    • Pressure ulcer, stage 1    • Tinea unguium    • Type 2 diabetes mellitus with polyneuropathy      Past Surgical History:   Procedure Laterality Date   • CYST REMOVAL      center of back; benign   • INCISION AND DRAINAGE ABSCESS       back   • OTHER SURGICAL HISTORY      Surgical clips left foot   • TOE SURGERY Right     Removal of 5th toe   • TRANS METATARSAL AMPUTATION Right 12/2/2021    Procedure: AMPUTATION TRANS METATARSAL;  Surgeon: Ash Leyva DPM;  Location: [facility] MAIN OR;  Service: Podiatry;  Laterality: Right;   • WRIST SURGERY Left     repair of injury     PT Assessment (last 12 hours)     PT Evaluation and Treatment     Row Name 12/05/21 1009          Physical Therapy Time and Intention    Subjective Information no complaints  -DP     Document Type evaluation  -DP     Mode of Treatment individual therapy; physical therapy  -DP     Patient Effort good  -DP     Row Name 12/05/21 1009          General Information    Patient Profile Reviewed yes  -DP     Patient Observations alert; cooperative; agree to therapy  -DP     Prior Level of Function independent:; gait; transfer; ADL's  -DP     Equipment Currently Used at Home rollator  -DP     Row Name 12/05/21 1009          Living Environment    Current Living Arrangements home/apartment/condo  -DP     Lives With alone  -DP     Row Name 12/05/21 1009          Range of Motion (ROM)    Range of Motion bilateral lower extremities; ROM is WFL  -DP     Row Name 12/05/21 1009          Strength (Manual Muscle Testing)    Strength (Manual Muscle Testing) --  Pt's BLE strength was assessed to be 4/5. R ankle mmt deferred.  -DP     Row Name 12/05/21 1009          Mobility    Extremity Weight-bearing Status right lower extremity  -DP     Right Lower Extremity (Weight-bearing Status) non weight-bearing (NWB)  -DP     Row Name 12/05/21 1009          Bed Mobility    Bed Mobility supine-sit  -DP     Supine-Sit [place] (Bed Mobility) minimum assist (75% patient effort)  -DP     Assistive Device (Bed Mobility) bed rails; head of bed elevated  -DP     Row Name 12/05/21 1009          Transfers    Comment (Transfers) Therapist educated through demonstration how to perform a STS while  maintaining NWB status. Pt attempted STS x2, but stated he felt unable without planting R foot.  -DP     Row Name 12/05/21 1009          Balance    Balance Assessment sitting static balance  -DP     Static Sitting Balance WFL  -DP     Row Name             Wound Right heel    Wound - Properties Group Side: Right  -DM Location: heel  -DM Stage, Pressure Injury : other (see comments)  -DM, covered by dressing at this time      Retired Wound - Properties Group Side: Right  -DM Location: heel  -DM     Row Name             Wound 06/22/21 1133 Left lateral heel    Wound - Properties Group Placement Date: 06/22/21  -FABIOLA Placement Time: 1133  -FABIOLA Present on Hospital Admission: Y  -FABIOLA Side: Left  -FABIOLA Orientation: lateral  -FABIOLA Location: heel  -FABIOLA     Retired Wound - Properties Group Date first assessed: 06/22/21  -FABIOLA Time first assessed: 1133  -FABIOLA Present on Hospital Admission: Y  -FABIOLA Side: Left  -FABIOLA Location: heel  -FABIOLA     Row Name             Wound 12/02/21 Right anterior foot Incision    Wound - Properties Group Placement Date: 12/02/21  -ES Side: Right  -ES Orientation: anterior  -ES Location: foot  -ES Primary Wound Type: Incision  -ES     Retired Wound - Properties Group Date first assessed: 12/02/21  -ES Side: Right  -ES Location: foot  -ES Primary Wound Type: Incision  -ES     Row Name 12/05/21 1009          Plan of Care Review    Plan of Care Reviewed With patient  -DP     Outcome Summary Pt presents with deficits in strength, balance, and mobility. Pt would benefit from skilled physical therapy to address the mentioned impairments and allow the pt to attain optimal functional mobility.  -DP     Row Name 12/05/21 1009          Physical Therapy Goals    Bed Mobility Goal Selection (PT) bed mobility, PT goal 1  -DP     Transfer Goal Selection (PT) transfer, PT goal 1  -DP     Gait Training Goal Selection (PT) gait training, PT goal 1  -DP     Row Name 12/05/21 1009          Bed Mobility Goal 1 (PT)    Activity/Assistive  Device (Bed Mobility Goal 1, PT) supine to sit  -DP     [place] Level/Cues Needed (Bed Mobility Goal 1, PT) modified independence  -DP     Time Frame (Bed Mobility Goal 1, PT) 10 days  -DP     Row Name 12/05/21 1009          Transfer Goal 1 (PT)    Activity/Assistive Device (Transfer Goal 1, PT) sit-to-stand/stand-to-sit; walker, rolling  -DP     [place] Level/Cues Needed (Transfer Goal 1, PT) minimum assist (75% or more patient effort)  -DP     Time Frame (Transfer Goal 1, PT) 10 days  -DP     Row Name 12/05/21 1009          Gait Training Goal 1 (PT)    Activity/Assistive Device (Gait Training Goal 1, PT) gait (walking locomotion); assistive device use; walker, rolling  -DP     [place] Level (Gait Training Goal 1, PT) contact guard assist  -DP     Distance (Gait Training Goal 1, PT) 20'  -DP     Time Frame (Gait Training Goal 1, PT) 10 days  -DP     Row Name 12/05/21 1009          Therapy Assessment/Plan (PT)    Rehab Potential (PT) good, to achieve stated therapy goals  -DP     Criteria for Skilled Interventions Met (PT) yes; meets criteria  -DP     Predicted Duration of Therapy Intervention (PT) 10 days  -DP     Problem List (PT) problems related to; balance; mobility; range of motion (ROM); strength  -DP     Row Name 12/05/21 1009          PT Evaluation Complexity    History, PT Evaluation Complexity 1-2 personal factors and/or comorbidities  -DP     Examination of Body Systems (PT Eval Complexity) total of 4 or more elements  -DP     Clinical Presentation (PT Evaluation Complexity) stable  -DP     Clinical Decision Making (PT Evaluation Complexity) low complexity  -DP     Overall Complexity (PT Evaluation Complexity) low complexity  -DP     Row Name 12/05/21 1009          Therapy Plan Review/Discharge Plan (PT)    Therapy Plan Review (PT) evaluation/treatment results reviewed; patient  -DP           User Key  (r) = Recorded By, (t) = Taken By, (c) = Cosigned By    Initials Name Provider Type     Nelly Pickering, RN Registered Nurse    Marlen Vaughn, RN Registered Nurse    Deedee Dove, PT Physical Therapist    Katlyn Garcia, RN Registered Nurse                Physical Therapy Education                 Title: PT OT SLP Therapies (Done)     Topic: Physical Therapy (Done)     Point: Mobility training (Done)     Learning Progress Summary           Patient Acceptance, E,TB, VU by RAMEZ at 12/5/2021 1019                   Point: Home exercise program (Done)     Learning Progress Summary           Patient Acceptance, E,TB, VU by RAMEZ at 12/5/2021 1019                   Point: Body mechanics (Done)     Learning Progress Summary           Patient Acceptance, E,TB, VU by RAMEZ at 12/5/2021 1019                   Point: Precautions (Done)     Learning Progress Summary           Patient Acceptance, E,TB, VU by RAMEZ at 12/5/2021 1019                               User Key     Initials Effective Dates Name Provider Type Discipline    DP 06/03/21 -  Deedee Landers, PT Physical Therapist PT              PT Recommendation and Plan  Anticipated Discharge Disposition (PT): inpatient rehabilitation facility, sub acute care setting  Planned Therapy Interventions (PT): balance training, bed mobility training, gait training, neuromuscular re-education, ROM (range of motion), stair training, strengthening, transfer training  Therapy Frequency (PT): daily  Plan of Care Reviewed With: patient  Outcome Summary: Pt presents with deficits in strength, balance, and mobility. Pt would benefit from skilled physical therapy to address the mentioned impairments and allow the pt to attain optimal functional mobility.   Outcome Measures     Row Name 12/05/21 1018             How much help from another person do you currently need...    Turning from your back to your side while in flat bed without using bedrails? 3  -DP      Moving from lying on back to sitting on the side of a flat bed without bedrails? 3  -DP      Moving to and from a  bed to a chair (including a wheelchair)? 2  -DP      Standing up from a chair using your arms (e.g., wheelchair, bedside chair)? 2  -DP      Climbing 3-5 steps with a railing? 2  -DP      To walk in hospital room? 2  -DP      AM-PAC 6 Clicks Score (PT) 14  -DP              Functional Assessment    Outcome Measure Options AM-PAC 6 Clicks Basic Mobility (PT)  -DP            User Key  (r) = Recorded By, (t) = Taken By, (c) = Cosigned By    Initials Name Provider Type    Deedee Dove, PT Physical Therapist                 Time Calculation:    PT Charges     Row Name 12/05/21 1020             Time Calculation    PT Received On 12/05/21  -DP      PT Goal Re-Cert Due Date 12/14/21  -DP              Untimed Charges    PT Eval/Re-eval Minutes 35  -DP              Total Minutes    Untimed Charges Total Minutes 35  -DP       Total Minutes 35  -DP            User Key  (r) = Recorded By, (t) = Taken By, (c) = Cosigned By    Initials Name Provider Type    Deedee Dove, PT Physical Therapist              Therapy Charges for Today     Code Description Service Date Service Provider Modifiers Qty    89985947223 HC PT EVAL LOW COMPLEXITY 3 12/5/2021 Deedee Landers, PT GP 1          PT G-Codes  Outcome Measure Options: AM-PAC 6 Clicks Basic Mobility (PT)  AM-PAC 6 Clicks Score (PT): 14    Deedee Landers PT  12/5/2021

## 2021-12-05 NOTE — PLAN OF CARE
Goal Outcome Evaluation:  Plan of Care Reviewed With: patient           Outcome Summary: Pt presents with deficits in strength, balance, and mobility. Pt would benefit from skilled physical therapy to address the mentioned impairments and allow the pt to attain optimal functional mobility.

## 2021-12-05 NOTE — PROGRESS NOTES
[facility]     Progress Note    Patient Name: Jose Shaikh  : 1960  MRN: [id number]  Primary Care Physician:  Corazon Gr MD  Date of admission: 2021      Subjective   Brief summary.  Patient admitted with nonhealing ulcer and osteomyelitis of the right foot      HPI:  Follow-up on osteomyelitis and nonhealing ulcer.  Status post right midfoot amputation.  Patient continues to have pain issues, feels like somebody is twisting his foot.  No fever chills  Does not feel dizzy.  Blood pressure soft  Wants bariatric commode.  Does not feel constipated although no BM    Review of Systems     Blood sugars doing good, no fever chills, no nausea vomiting      Objective     Vitals:   Temp:  [98 °F (36.7 °C)-99.7 °F (37.6 °C)] 98.7 °F (37.1 °C)  Heart Rate:  [62-74] 66  Resp:  [16-18] 18  BP: ()/(53-61) 103/53    Physical Exam :     Elderly male not in acute distress  Heart regular lungs clear  Abdomen obese  But foot in surgical dressing      Result Review:  I have personally reviewed the results from the time of this admission to 2021 16:12 EST and agree with these findings:  [x]  Laboratory  []  Microbiology  []  Radiology  []  EKG/Telemetry   []  Cardiology/Vascular   []  Pathology  []  Old records  []  Other:           Assessment / Plan       Active Hospital Problems:  Active Hospital Problems    Diagnosis    • Cellulitis and abscess of foot    • Lactic acidosis    • Essential hypertension    • Diabetic ulcer of right midfoot associated with type 2 DM    • Diabetic ulcer of left heel associated with type 2 DM        Plan:   Decrease metoprolol by 50% as blood pressure is soft.  Finished IV Zosyn  Blood sugars doing good  Check labs.  Add Lyrica for phantom pain.  Continue Norco and as needed morphine  Waiting for placement.  Discussed with  no arm digit.  Discussed with nursing staff       DVT prophylaxis:  Medical DVT prophylaxis orders are present.    CODE STATUS:   Code  Status (Patient has no pulse and is not breathing): CPR (Attempt to Resuscitate)  Medical Interventions (Patient has pulse or is breathing): Full Support            Electronically signed by Shekhar Au MD, 12/05/21, 4:14 PM EST.

## 2021-12-05 NOTE — PROGRESS NOTES
[facility] - PODIATRY    Today's Date: 12/05/21    Patient Name: Jose Shaikh  MRN: [id number]  CSN: [id number]  PCP: Corazon Gr MD  Referring Provider: Yung Grijalva MD  Attending Provider: Yung Grijalva MD  Length of Stay: 4    SUBJECTIVE   Chief Complaint: Right foot osteomyelitis    HPI: Jose Shaikh, a 61 y.o.male,     Procedure: Right midfoot amputation  Date: 2 December 2021    Patient states they are doing well without complications.  Patient states they are following post-op instructions.  Patient states pain is controlled.      Patient denies any fevers, chills, nausea, vomiting, shortness of breathe, nor any other constitutional signs nor symptoms.      Patient states he is looking forward to going to rehabilitation bed.    Past Medical History:   Diagnosis Date   • Absence of toe of right foot    • Acute osteomyelitis of left calcaneus  8/18/2021   • Anxiety and depression    • Arthritis    • Claustrophobia    • Corns and callus    • Diabetic ulcer of left heel associated with type 2 DM 8/18/2021   • Diabetic ulcer of left heel associated with type 2 DM 7/6/2021   • Diabetic ulcer of right midfoot associated with type 2 DM 8/18/2021   • Difficulty walking    • Essential hypertension 8/31/2021   • Hammertoe    • Hyperlipidemia LDL goal <100 8/31/2021   • Ingrown toenail    • Obesity    • Paroxysmal atrial fibrillation 8/31/2021   • Polyneuropathy    • Pressure ulcer, stage 1    • Tinea unguium    • Type 2 diabetes mellitus with polyneuropathy      Past Surgical History:   Procedure Laterality Date   • CYST REMOVAL      center of back; benign   • INCISION AND DRAINAGE ABSCESS      back   • OTHER SURGICAL HISTORY      Surgical clips left foot   • TOE SURGERY Right     Removal of 5th toe   • TRANS METATARSAL AMPUTATION Right 12/2/2021    Procedure: AMPUTATION TRANS METATARSAL;  Surgeon: Ash Leyva DPM;  Location: [facility] MAIN OR;  Service: Podiatry;  Laterality: Right;   •  WRIST SURGERY Left     repair of injury     Family History   Problem Relation Age of Onset   • Heart disease Mother    • Heart disease Father    • Cancer Father         Unspecified   • Heart disease Brother      Social History     Socioeconomic History   • Marital status: Single   Tobacco Use   • Smoking status: Former Smoker     Packs/day: 0.00     Years: 1.00     Pack years: 0.00     Quit date: 1991     Years since quittin.2   • Smokeless tobacco: Never Used   • Tobacco comment: quit at age 30   Vaping Use   • Vaping Use: Never used   Substance and Sexual Activity   • Alcohol use: Yes     Comment: Rare   • Drug use: Yes     Types: Marijuana     Comment: Daily   • Sexual activity: Defer     Allergies   Allergen Reactions   • Adhesive Tape Rash     Current Facility-Administered Medications   Medication Dose Route Frequency Provider Last Rate Last Admin   • acetaminophen (TYLENOL) tablet 650 mg  650 mg Oral Q4H PRN Darryn Osullivan MD        Or   • acetaminophen (TYLENOL) suppository 650 mg  650 mg Rectal Q4H PRN Darryn Osullivan MD       • acetaminophen (TYLENOL) tablet 650 mg  650 mg Oral Q4H PRN Darryn Osullivan MD       • ALPRAZolam (XANAX) tablet 0.25 mg  0.25 mg Oral Q6H PRN Darryn Osullivan MD       • aluminum-magnesium hydroxide-simethicone (MAALOX MAX) 400-400-40 MG/5ML suspension 15 mL  15 mL Oral Q6H PRN Darryn Osullivan MD       • atorvastatin (LIPITOR) tablet 10 mg  10 mg Oral Nightly Darryn Osullivan MD   10 mg at 21   • sennosides-docusate (PERICOLACE) 8.6-50 MG per tablet 1 tablet  1 tablet Oral BID Darryn Osullivan MD   1 tablet at 21 0831    And   • polyethylene glycol (MIRALAX) packet 17 g  17 g Oral Daily PRN Darryn Osullivan MD        And   • bisacodyl (DULCOLAX) EC tablet 5 mg  5 mg Oral Daily PRN Darryn Osullivan MD        And   • bisacodyl (DULCOLAX) suppository 10 mg  10 mg Rectal Daily PRN Darryn Osullivan MD       • buPROPion XL (WELLBUTRIN XL) 24 hr tablet 150 mg  150 mg  Oral BID Darryn Osullivan MD   150 mg at 12/05/21 0830   • citalopram (CeleXA) tablet 10 mg  10 mg Oral Daily aDrryn Osullivan MD   10 mg at 12/04/21 2118   • dextrose (GLUTOSE) oral gel 15 g  15 g Oral Q15 Min PRN Darryn Osullivan MD       • dextrose 10 % infusion  25 g Intravenous Q15 Min PRN Darryn Osullivan MD       • enoxaparin (LOVENOX) syringe 40 mg  40 mg Subcutaneous Q12H Darryn Osullivan MD   40 mg at 12/05/21 1207   • famotidine (PEPCID) tablet 40 mg  40 mg Oral Daily Darryn Osullivan MD   40 mg at 12/05/21 0830   • glucagon (human recombinant) (GLUCAGEN DIAGNOSTIC) injection 1 mg  1 mg Subcutaneous Q15 Min PRN Darryn Osullivan MD       • HYDROcodone-acetaminophen (NORCO)  MG per tablet 1 tablet  1 tablet Oral Q4H PRN Darryn Osullivan MD   1 tablet at 12/05/21 1321   • HYDROcodone-acetaminophen (NORCO) 5-325 MG per tablet 1 tablet  1 tablet Oral Q4H PRN Darryn Osullivan MD       • HYDROcodone-acetaminophen (NORCO) 5-325 MG per tablet 1 tablet  1 tablet Oral Q4H PRN Darryn Osullivan MD       • insulin detemir (LEVEMIR) injection 25 Units  25 Units Subcutaneous Nightly Darryn Osullivan MD   25 Units at 12/04/21 2120   • insulin lispro (humaLOG) injection 0-9 Units  0-9 Units Subcutaneous 4x Daily With Meals & Nightly Darryn Osullivan MD   4 Units at 12/05/21 1207   • lisinopril (PRINIVIL,ZESTRIL) tablet 20 mg  20 mg Oral Daily Darryn Osullivan MD   20 mg at 12/05/21 0830   • [START ON 12/6/2021] metoprolol succinate XL (TOPROL-XL) 24 hr tablet 25 mg  25 mg Oral Q24H Shekhar Au MD       • morphine injection 2 mg  2 mg Intravenous Q2H PRN Darryn Osullivan MD   2 mg at 12/04/21 0952   • naloxone (NARCAN) injection 0.4 mg  0.4 mg Intravenous Q5 Min PRN Darryn Osullivan MD       • ondansetron (ZOFRAN) injection 4 mg  4 mg Intravenous Q4H PRN Darryn Osullivan MD       • Pharmacy to Dose enoxaparin (LOVENOX)   Does not apply Continuous PRN Darryn Osullivan MD       • Pharmacy to Dose Zosyn   Does not apply Continuous  PRN Darryn Osullivan MD       • piperacillin-tazobactam (ZOSYN) 4.5 g/100 mL 0.9% NS IVPB (mbp)  4.5 g Intravenous Q8H Darryn Osullivan MD 0 mL/hr at 12/03/21 1230 4.5 g at 12/05/21 1322   • pregabalin (LYRICA) capsule 25 mg  25 mg Oral Q12H Yung Grijalva MD   25 mg at 12/05/21 0830   • sodium chloride 0.9 % flush 10 mL  10 mL Intravenous Q12H Darryn Osullivan MD   10 mL at 12/05/21 0831   • sodium chloride 0.9 % flush 10 mL  10 mL Intravenous PRN Darryn Osullivan MD       • Sodium Hypochlorite (DAKIN'S) 0.125 % topical solution 0.125% (quarter strength)   Topical Q12H Darryn Osullivan MD   Given at 12/05/21 0831   • tamsulosin (FLOMAX) 24 hr capsule 0.4 mg  0.4 mg Oral Nightly Darryn Osullivan MD   0.4 mg at 12/04/21 2118   • temazepam (RESTORIL) capsule 15 mg  15 mg Oral Nightly PRN Darryn Osullivan MD         Review of Systems    OBJECTIVE     Vitals:    12/05/21 1100   BP: 110/61   Pulse: 62   Resp: 16   Temp: 98.5 °F (36.9 °C)   SpO2: 94%       PHYSICAL EXAM    GEN:   A&Ox3, NAD. Pt presents in hospital bed.     Patient's nurse present.    Neurovascular status unchanged    Ortho: Right midfoot amputation    Right midfoot shows sutures intact with skin edges well-coapted with no signs of dehiscence.  Small amount of bloody serous drainage present at midpoint of surgical incision site.  Healthy surgical skin edges.  No drainage present.  No edema, erythema, calor, lymphangitis, nor signs of infection seen.    Left plantar heel shows stage III ulceration with granular tissue present.  No signs of edema, erythema, lymphangitis, nor signs of infection.      LABORATORY/CULTURE RESULTS:  Results from last 7 days   Lab Units 12/05/21  0612 12/01/21  1204   WBC 10*3/mm3 7.58 7.19   HEMOGLOBIN g/dL 12.1* 13.9   HEMATOCRIT % 37.5 41.3   PLATELETS 10*3/mm3 107* 100*     Results from last 7 days   Lab Units 12/05/21  0612 12/01/21  1204   SODIUM mmol/L 136 134*   POTASSIUM mmol/L 4.5 4.5   CHLORIDE mmol/L 102 101   CO2 mmol/L 27.5  23.9   BUN mg/dL 10 9   CREATININE mg/dL 0.74* 0.69*   CALCIUM mg/dL 8.7 9.1   BILIRUBIN mg/dL 0.9 0.8   ALK PHOS U/L 195* 197*   ALT (SGPT) U/L 31 41   AST (SGOT) U/L 35 48*   GLUCOSE mg/dL 126* 143*         Microbiology Results (last 10 days)     ** No results found for the last 240 hours. **           ASSESSMENT/PLAN     Patient Active Problem List   Diagnosis   • Diabetic ulcer of left heel associated with type 2 DM   • Acute osteomyelitis of left calcaneus    • Diabetic ulcer of left heel associated with type 2 DM   • Diabetic ulcer of right midfoot associated with type 2 DM   • Paroxysmal atrial fibrillation   • Essential hypertension   • Hyperlipidemia LDL goal <100   • Cellulitis and abscess of foot   • Lactic acidosis       Comprehensive lower extremity examination and evaluation was performed.    Discussed findings and treatment plan including risks, benefits, and treatment options with patient in detail. Patient agreed with treatment plan.    Recommend:  -Rehabilitation placement for at least 1 month   -Daily dressing changes  -Strict nonweightbearing to right foot.    Discussed with patient's nurse.    Message left with Dr. Estrella Grijalva.      Lab Frequency Next Occurrence   Lipid Panel Once 02/27/2022   WOUND CARE HYPERBARIC weekdays        This document has been electronically signed by Ash Leyva DPM on December 5, 2021 14:00 EST

## 2021-12-05 NOTE — PLAN OF CARE
Goal Outcome Evaluation:  Plan of Care Reviewed With: patient        Progress: improving  Outcome Summary: pt here with deficits in mobilty and strength from surgery on right foot, pt would benefit from skilled PT, pt A&O x4, dressing change done per doctor orders, pt up to bsc today with 2 assist, pain meds given once today

## 2021-12-06 LAB
CYTO UR: NORMAL
GLUCOSE BLDC GLUCOMTR-MCNC: 122 MG/DL (ref 70–99)
GLUCOSE BLDC GLUCOMTR-MCNC: 129 MG/DL (ref 70–99)
GLUCOSE BLDC GLUCOMTR-MCNC: 208 MG/DL (ref 70–99)
GLUCOSE BLDC GLUCOMTR-MCNC: 215 MG/DL (ref 70–99)
LAB AP CASE REPORT: NORMAL
LAB AP CLINICAL INFORMATION: NORMAL
PATH REPORT.FINAL DX SPEC: NORMAL
PATH REPORT.GROSS SPEC: NORMAL

## 2021-12-06 PROCEDURE — 82962 GLUCOSE BLOOD TEST: CPT

## 2021-12-06 PROCEDURE — 63710000001 INSULIN LISPRO (HUMAN) PER 5 UNITS: Performed by: INTERNAL MEDICINE

## 2021-12-06 PROCEDURE — 25010000002 PIPERACILLIN SOD-TAZOBACTAM PER 1 G: Performed by: INTERNAL MEDICINE

## 2021-12-06 PROCEDURE — 94799 UNLISTED PULMONARY SVC/PX: CPT

## 2021-12-06 PROCEDURE — 63710000001 INSULIN DETEMIR PER 5 UNITS: Performed by: INTERNAL MEDICINE

## 2021-12-06 PROCEDURE — 25010000002 ENOXAPARIN PER 10 MG: Performed by: INTERNAL MEDICINE

## 2021-12-06 RX ADMIN — SENNOSIDES AND DOCUSATE SODIUM 1 TABLET: 50; 8.6 TABLET ORAL at 21:13

## 2021-12-06 RX ADMIN — FAMOTIDINE 40 MG: 20 TABLET ORAL at 09:56

## 2021-12-06 RX ADMIN — METOPROLOL SUCCINATE 25 MG: 25 TABLET, EXTENDED RELEASE ORAL at 09:56

## 2021-12-06 RX ADMIN — LISINOPRIL 20 MG: 20 TABLET ORAL at 09:56

## 2021-12-06 RX ADMIN — PREGABALIN 25 MG: 25 CAPSULE ORAL at 21:12

## 2021-12-06 RX ADMIN — INSULIN DETEMIR 25 UNITS: 100 INJECTION, SOLUTION SUBCUTANEOUS at 21:13

## 2021-12-06 RX ADMIN — TAMSULOSIN HYDROCHLORIDE 0.4 MG: 0.4 CAPSULE ORAL at 21:13

## 2021-12-06 RX ADMIN — HYDROCODONE BITARTRATE AND ACETAMINOPHEN 1 TABLET: 5; 325 TABLET ORAL at 17:18

## 2021-12-06 RX ADMIN — CITALOPRAM HYDROBROMIDE 10 MG: 20 TABLET ORAL at 21:12

## 2021-12-06 RX ADMIN — PIPERACILLIN SODIUM AND TAZOBACTAM SODIUM 4.5 G: 4; .5 INJECTION, POWDER, LYOPHILIZED, FOR SOLUTION INTRAVENOUS at 14:10

## 2021-12-06 RX ADMIN — INSULIN LISPRO 4 UNITS: 100 INJECTION, SOLUTION INTRAVENOUS; SUBCUTANEOUS at 12:14

## 2021-12-06 RX ADMIN — BUPROPION HYDROCHLORIDE 150 MG: 150 TABLET, EXTENDED RELEASE ORAL at 12:14

## 2021-12-06 RX ADMIN — Medication: at 09:58

## 2021-12-06 RX ADMIN — Medication: at 21:13

## 2021-12-06 RX ADMIN — SODIUM CHLORIDE, PRESERVATIVE FREE 10 ML: 5 INJECTION INTRAVENOUS at 09:00

## 2021-12-06 RX ADMIN — PIPERACILLIN SODIUM AND TAZOBACTAM SODIUM 4.5 G: 4; .5 INJECTION, POWDER, LYOPHILIZED, FOR SOLUTION INTRAVENOUS at 06:07

## 2021-12-06 RX ADMIN — INSULIN LISPRO 4 UNITS: 100 INJECTION, SOLUTION INTRAVENOUS; SUBCUTANEOUS at 21:13

## 2021-12-06 RX ADMIN — ENOXAPARIN SODIUM 40 MG: 40 INJECTION SUBCUTANEOUS at 12:13

## 2021-12-06 RX ADMIN — ENOXAPARIN SODIUM 40 MG: 40 INJECTION SUBCUTANEOUS at 00:52

## 2021-12-06 RX ADMIN — ATORVASTATIN CALCIUM 10 MG: 10 TABLET, FILM COATED ORAL at 21:12

## 2021-12-06 RX ADMIN — BUPROPION HYDROCHLORIDE 150 MG: 150 TABLET, EXTENDED RELEASE ORAL at 21:12

## 2021-12-06 RX ADMIN — PREGABALIN 25 MG: 25 CAPSULE ORAL at 09:57

## 2021-12-06 NOTE — PLAN OF CARE
Goal Outcome Evaluation:  Plan of Care Reviewed With: patient        Progress: improving  Outcome Summary: Patient had complaints of pain throughout shift, see MAR for pain management. Patient's wound care completed per orders. Patient was able to use urinal independently.Patient is hopeful to discharge to a rehab facility soon.

## 2021-12-06 NOTE — PLAN OF CARE
Goal Outcome Evaluation:      No significant changes in patients condition. Patient has complained of pain in right foot. Pain meds were given. Patient slept throughout shift in between care.

## 2021-12-07 ENCOUNTER — TELEPHONE (OUTPATIENT)
Dept: WOUND CARE | Facility: HOSPITAL | Age: 61
End: 2021-12-07

## 2021-12-07 LAB
GLUCOSE BLDC GLUCOMTR-MCNC: 131 MG/DL (ref 70–99)
GLUCOSE BLDC GLUCOMTR-MCNC: 144 MG/DL (ref 70–99)
GLUCOSE BLDC GLUCOMTR-MCNC: 201 MG/DL (ref 70–99)
GLUCOSE BLDC GLUCOMTR-MCNC: 245 MG/DL (ref 70–99)

## 2021-12-07 PROCEDURE — 82962 GLUCOSE BLOOD TEST: CPT

## 2021-12-07 PROCEDURE — 25010000002 ENOXAPARIN PER 10 MG: Performed by: INTERNAL MEDICINE

## 2021-12-07 PROCEDURE — 63710000001 INSULIN LISPRO (HUMAN) PER 5 UNITS: Performed by: INTERNAL MEDICINE

## 2021-12-07 PROCEDURE — 63710000001 INSULIN DETEMIR PER 5 UNITS: Performed by: INTERNAL MEDICINE

## 2021-12-07 PROCEDURE — 99024 POSTOP FOLLOW-UP VISIT: CPT | Performed by: PODIATRIST

## 2021-12-07 RX ADMIN — SENNOSIDES AND DOCUSATE SODIUM 1 TABLET: 50; 8.6 TABLET ORAL at 21:35

## 2021-12-07 RX ADMIN — Medication: at 08:15

## 2021-12-07 RX ADMIN — SENNOSIDES AND DOCUSATE SODIUM 1 TABLET: 50; 8.6 TABLET ORAL at 08:14

## 2021-12-07 RX ADMIN — INSULIN LISPRO 4 UNITS: 100 INJECTION, SOLUTION INTRAVENOUS; SUBCUTANEOUS at 12:06

## 2021-12-07 RX ADMIN — FAMOTIDINE 40 MG: 20 TABLET ORAL at 08:14

## 2021-12-07 RX ADMIN — SODIUM CHLORIDE, PRESERVATIVE FREE 10 ML: 5 INJECTION INTRAVENOUS at 08:15

## 2021-12-07 RX ADMIN — INSULIN DETEMIR 25 UNITS: 100 INJECTION, SOLUTION SUBCUTANEOUS at 21:39

## 2021-12-07 RX ADMIN — TAMSULOSIN HYDROCHLORIDE 0.4 MG: 0.4 CAPSULE ORAL at 21:35

## 2021-12-07 RX ADMIN — Medication: at 20:15

## 2021-12-07 RX ADMIN — ATORVASTATIN CALCIUM 10 MG: 10 TABLET, FILM COATED ORAL at 21:35

## 2021-12-07 RX ADMIN — PREGABALIN 25 MG: 25 CAPSULE ORAL at 08:14

## 2021-12-07 RX ADMIN — METOPROLOL SUCCINATE 25 MG: 25 TABLET, EXTENDED RELEASE ORAL at 08:13

## 2021-12-07 RX ADMIN — SODIUM CHLORIDE, PRESERVATIVE FREE 10 ML: 5 INJECTION INTRAVENOUS at 21:45

## 2021-12-07 RX ADMIN — BUPROPION HYDROCHLORIDE 150 MG: 150 TABLET, EXTENDED RELEASE ORAL at 21:35

## 2021-12-07 RX ADMIN — PREGABALIN 25 MG: 25 CAPSULE ORAL at 21:35

## 2021-12-07 RX ADMIN — ENOXAPARIN SODIUM 40 MG: 40 INJECTION SUBCUTANEOUS at 12:07

## 2021-12-07 RX ADMIN — LISINOPRIL 20 MG: 20 TABLET ORAL at 08:14

## 2021-12-07 RX ADMIN — CITALOPRAM HYDROBROMIDE 10 MG: 20 TABLET ORAL at 21:36

## 2021-12-07 RX ADMIN — APIXABAN 5 MG: 5 TABLET, FILM COATED ORAL at 21:36

## 2021-12-07 RX ADMIN — BUPROPION HYDROCHLORIDE 150 MG: 150 TABLET, EXTENDED RELEASE ORAL at 08:14

## 2021-12-07 RX ADMIN — INSULIN LISPRO 4 UNITS: 100 INJECTION, SOLUTION INTRAVENOUS; SUBCUTANEOUS at 21:39

## 2021-12-07 RX ADMIN — ENOXAPARIN SODIUM 40 MG: 40 INJECTION SUBCUTANEOUS at 01:42

## 2021-12-07 NOTE — PLAN OF CARE
Goal Outcome Evaluation:      When asked about patients pain, patient stated it didn't matter because he was ready to get off pain meds. Patient hasn't requested pain medication all shift. Patient slept throughout the night in between care.

## 2021-12-07 NOTE — PLAN OF CARE
Goal Outcome Evaluation:      No c/o pain throughout shift, wound care provided. VSS. Patient A&O X 4, awaits rehab placement

## 2021-12-07 NOTE — PROGRESS NOTES
[facility]     Progress Note    Patient Name: Jose Shaikh  : 1960  MRN: [id number]  Primary Care Physician:  Corazon Gr MD  Date of admission: 2021      Subjective   Brief summary.  Patient admitted with nonhealing ulcer and osteomyelitis of the right foot      HPI:  Follow-up on osteomyelitis and nonhealing ulcer.    Patient post amputation doing well.  Pain improving.  No fever    Review of Systems     No fever chills.  Less pain today.  Blood sugar stable      Objective     Vitals:   Temp:  [98 °F (36.7 °C)-99.3 °F (37.4 °C)] 98.2 °F (36.8 °C)  Heart Rate:  [65-79] 67  Resp:  [14-18] 18  BP: ()/(58-68) 105/62    Physical Exam :     Elderly male not in acute distress.  Neck supple.  Heart regular lungs clear  Abdomen obese  Right foot in surgical dressing      Result Review:  I have personally reviewed the results from the time of this admission to 2021 14:00 EST and agree with these findings:  [x]  Laboratory  []  Microbiology  []  Radiology  []  EKG/Telemetry   []  Cardiology/Vascular   []  Pathology  []  Old records  []  Other:           Assessment / Plan       Active Hospital Problems:  Active Hospital Problems    Diagnosis    • Cellulitis and abscess of foot    • Lactic acidosis    • Essential hypertension    • Diabetic ulcer of right midfoot associated with type 2 DM    • Diabetic ulcer of left heel associated with type 2 DM        Plan:     Continues to improve slowly.  Blood sugar stable  Discussed with , pre-CERT started, waiting for placement    DVT prophylaxis:  Medical DVT prophylaxis orders are present.    CODE STATUS:   Code Status (Patient has no pulse and is not breathing): CPR (Attempt to Resuscitate)  Medical Interventions (Patient has pulse or is breathing): Full Support            Electronically signed by Yung Grijalva MD, 21, 2:01 PM EST.

## 2021-12-08 LAB
GLUCOSE BLDC GLUCOMTR-MCNC: 135 MG/DL (ref 70–99)
GLUCOSE BLDC GLUCOMTR-MCNC: 163 MG/DL (ref 70–99)
GLUCOSE BLDC GLUCOMTR-MCNC: 185 MG/DL (ref 70–99)
GLUCOSE BLDC GLUCOMTR-MCNC: 200 MG/DL (ref 70–99)

## 2021-12-08 PROCEDURE — 82962 GLUCOSE BLOOD TEST: CPT

## 2021-12-08 PROCEDURE — 94799 UNLISTED PULMONARY SVC/PX: CPT

## 2021-12-08 PROCEDURE — 97110 THERAPEUTIC EXERCISES: CPT

## 2021-12-08 PROCEDURE — 63710000001 INSULIN LISPRO (HUMAN) PER 5 UNITS: Performed by: INTERNAL MEDICINE

## 2021-12-08 PROCEDURE — 63710000001 INSULIN DETEMIR PER 5 UNITS: Performed by: INTERNAL MEDICINE

## 2021-12-08 RX ORDER — LOPERAMIDE HYDROCHLORIDE 2 MG/1
2 CAPSULE ORAL EVERY 4 HOURS PRN
Status: DISCONTINUED | OUTPATIENT
Start: 2021-12-08 | End: 2021-12-21 | Stop reason: HOSPADM

## 2021-12-08 RX ADMIN — ATORVASTATIN CALCIUM 10 MG: 10 TABLET, FILM COATED ORAL at 21:35

## 2021-12-08 RX ADMIN — SODIUM CHLORIDE, PRESERVATIVE FREE 10 ML: 5 INJECTION INTRAVENOUS at 23:13

## 2021-12-08 RX ADMIN — BUPROPION HYDROCHLORIDE 150 MG: 150 TABLET, EXTENDED RELEASE ORAL at 21:35

## 2021-12-08 RX ADMIN — INSULIN LISPRO 4 UNITS: 100 INJECTION, SOLUTION INTRAVENOUS; SUBCUTANEOUS at 11:38

## 2021-12-08 RX ADMIN — Medication: at 23:12

## 2021-12-08 RX ADMIN — METOPROLOL SUCCINATE 25 MG: 25 TABLET, EXTENDED RELEASE ORAL at 09:48

## 2021-12-08 RX ADMIN — LISINOPRIL 20 MG: 20 TABLET ORAL at 09:48

## 2021-12-08 RX ADMIN — CITALOPRAM HYDROBROMIDE 10 MG: 20 TABLET ORAL at 21:35

## 2021-12-08 RX ADMIN — PREGABALIN 25 MG: 25 CAPSULE ORAL at 09:47

## 2021-12-08 RX ADMIN — INSULIN LISPRO 2 UNITS: 100 INJECTION, SOLUTION INTRAVENOUS; SUBCUTANEOUS at 17:43

## 2021-12-08 RX ADMIN — BUPROPION HYDROCHLORIDE 150 MG: 150 TABLET, EXTENDED RELEASE ORAL at 09:47

## 2021-12-08 RX ADMIN — PREGABALIN 25 MG: 25 CAPSULE ORAL at 21:35

## 2021-12-08 RX ADMIN — TAMSULOSIN HYDROCHLORIDE 0.4 MG: 0.4 CAPSULE ORAL at 21:34

## 2021-12-08 RX ADMIN — INSULIN LISPRO 2 UNITS: 100 INJECTION, SOLUTION INTRAVENOUS; SUBCUTANEOUS at 21:33

## 2021-12-08 RX ADMIN — Medication: at 09:50

## 2021-12-08 RX ADMIN — INSULIN DETEMIR 25 UNITS: 100 INJECTION, SOLUTION SUBCUTANEOUS at 21:34

## 2021-12-08 RX ADMIN — APIXABAN 5 MG: 5 TABLET, FILM COATED ORAL at 09:48

## 2021-12-08 RX ADMIN — FAMOTIDINE 40 MG: 20 TABLET ORAL at 09:48

## 2021-12-08 RX ADMIN — SODIUM CHLORIDE, PRESERVATIVE FREE 10 ML: 5 INJECTION INTRAVENOUS at 09:50

## 2021-12-08 RX ADMIN — APIXABAN 5 MG: 5 TABLET, FILM COATED ORAL at 21:35

## 2021-12-08 NOTE — THERAPY TREATMENT NOTE
Acute Care - Physical Therapy Progress Note   [facility]     Patient Name: Jose Shaikh  : 1960  MRN: [id number]  Today's Date: 2021      Visit Dx:     ICD-10-CM ICD-9-CM   1. Diabetic ulcer of right midfoot associated with type 2 DM  E11.621 250.80    L97.415 707.14   2. Diabetic ulcer of right midfoot associated with type 2 diabetes mellitus, with muscle involvement without evidence of necrosis (HCC)  E11.621 250.80    L97.415 707.14   3. Difficulty walking  R26.2 719.7     Patient Active Problem List   Diagnosis   • Diabetic ulcer of left heel associated with type 2 DM   • Acute osteomyelitis of left calcaneus    • Diabetic ulcer of left heel associated with type 2 DM   • Diabetic ulcer of right midfoot associated with type 2 DM   • Paroxysmal atrial fibrillation   • Essential hypertension   • Hyperlipidemia LDL goal <100   • Cellulitis and abscess of foot   • Lactic acidosis     Past Medical History:   Diagnosis Date   • Absence of toe of right foot    • Acute osteomyelitis of left calcaneus  2021   • Anxiety and depression    • Arthritis    • Claustrophobia    • Corns and callus    • Diabetic ulcer of left heel associated with type 2 DM 2021   • Diabetic ulcer of left heel associated with type 2 DM 2021   • Diabetic ulcer of right midfoot associated with type 2 DM 2021   • Difficulty walking    • Essential hypertension 2021   • Hammertoe    • Hyperlipidemia LDL goal <100 2021   • Ingrown toenail    • Obesity    • Paroxysmal atrial fibrillation 2021   • Polyneuropathy    • Pressure ulcer, stage 1    • Tinea unguium    • Type 2 diabetes mellitus with polyneuropathy      Past Surgical History:   Procedure Laterality Date   • CYST REMOVAL      center of back; benign   • INCISION AND DRAINAGE ABSCESS      back   • OTHER SURGICAL HISTORY      Surgical clips left foot   • TOE SURGERY Right     Removal of 5th toe   • TRANS METATARSAL AMPUTATION Right 2021     Procedure: AMPUTATION TRANS METATARSAL;  Surgeon: Ash Leyva DPM;  Location: [facility] MAIN OR;  Service: Podiatry;  Laterality: Right;   • WRIST SURGERY Left     repair of injury     PT Assessment (last 12 hours)     PT Evaluation and Treatment     Row Name 12/08/21 1300          Physical Therapy Time and Intention    Subjective Information no complaints  -CS     Document Type therapy note (daily note)  -CS     Mode of Treatment individual therapy; physical therapy  -CS     Patient Effort good  -CS     Symptoms Noted During/After Treatment none  -CS     Row Name 12/08/21 1300          Bed Mobility    Bed Mobility supine-sit  -CS     Supine-Sit [place] (Bed Mobility) verbal cues; contact guard; 1 person assist  -CS     Assistive Device (Bed Mobility) bed rails; head of bed elevated  -CS     Row Name 12/08/21 1300          Motor Skills    Therapeutic Exercise hip; knee; ankle  -CS     Row Name 12/08/21 1300          Hip (Therapeutic Exercise)    Hip (Therapeutic Exercise) AROM (active range of motion)  -     Hip AROM (Therapeutic Exercise) bilateral; flexion; extension; aBduction; aDduction; sitting; 10 repetitions; 3 repetitions  -CS     Row Name 12/08/21 1300          Knee (Therapeutic Exercise)    Knee (Therapeutic Exercise) AROM (active range of motion)  -     Knee AROM (Therapeutic Exercise) bilateral; LAQ (long arc quad); sitting; 10 repetitions; 3 sets  -CS     Row Name 12/08/21 1300          Ankle (Therapeutic Exercise)    Ankle (Therapeutic Exercise) AROM (active range of motion)  -     Ankle AROM (Therapeutic Exercise) bilateral; dorsiflexion; plantarflexion  -CS     Row Name             Wound Right heel    Wound - Properties Group Side: Right  -DM Location: heel  -DM Stage, Pressure Injury : other (see comments)  -DM, covered by dressing at this time      Retired Wound - Properties Group Side: Right  -DM Location: heel  -DM     Row Name             Wound 06/22/21 1133 Left lateral heel     Wound - Properties Group Placement Date: 06/22/21  -FABIOLA Placement Time: 1133  -FABIOLA Present on Hospital Admission: Y  -FABIOLA Side: Left  -FABIOLA Orientation: lateral  -FABIOLA Location: heel  -FABIOLA     Retired Wound - Properties Group Date first assessed: 06/22/21  -FABIOLA Time first assessed: 1133  -FABIOLA Present on Hospital Admission: Y  -FABIOLA Side: Left  -FABIOLA Location: heel  -FABIOLA     Row Name             Wound 12/02/21 Right anterior foot Incision    Wound - Properties Group Placement Date: 12/02/21  -ES Side: Right  -ES Orientation: anterior  -ES Location: foot  -ES Primary Wound Type: Incision  -ES     Retired Wound - Properties Group Date first assessed: 12/02/21  -ES Side: Right  -ES Location: foot  -ES Primary Wound Type: Incision  -ES     Row Name 12/08/21 1300          Progress Summary (PT)    Daily Progress Summary (PT) Pt. presents in semi stewart position upon entering room.  Pt. agreeable to PT Tx and T/F'd to EOB without external assistance.  Pt. performed seated TE as per this note and then remained at EOB after PT Tx.  -CS           User Key  (r) = Recorded By, (t) = Taken By, (c) = Cosigned By    Initials Name Provider Type    Nelly Pickering, RN Registered Nurse    Marlen Vaughn, RN Registered Nurse    Ronald Huerta PTA Physical Therapy Assistant    Katlyn Garcia, RN Registered Nurse                Physical Therapy Education                 Title: PT OT SLP Therapies (Done)     Topic: Physical Therapy (Done)     Point: Mobility training (Done)     Learning Progress Summary           Patient Acceptance, E,TB, VU by DP at 12/5/2021 1019                   Point: Home exercise program (Done)     Learning Progress Summary           Patient Acceptance, E,TB, VU by DP at 12/5/2021 1019                   Point: Body mechanics (Done)     Learning Progress Summary           Patient Acceptance, E,TB, VU by DP at 12/5/2021 1019                   Point: Precautions (Done)     Learning Progress Summary           Patient  Acceptance, E,TB, VU by DP at 12/5/2021 1019                               User Key     Initials Effective Dates Name Provider Type Discipline    DP 06/03/21 -  Deedee Landers, PT Physical Therapist PT              PT Recommendation and Plan     Progress Summary (PT)  Daily Progress Summary (PT): Pt. presents in semi stewart position upon entering room.  Pt. agreeable to PT Tx and T/F'd to EOB without external assistance.  Pt. performed seated TE as per this note and then remained at EOB after PT Tx.   Outcome Measures     Row Name 12/08/21 1300             How much help from another person do you currently need...    Turning from your back to your side while in flat bed without using bedrails? 3  -CS      Moving from lying on back to sitting on the side of a flat bed without bedrails? 3  -CS      Moving to and from a bed to a chair (including a wheelchair)? 2  -CS      Standing up from a chair using your arms (e.g., wheelchair, bedside chair)? 2  -CS      Climbing 3-5 steps with a railing? 1  -CS      To walk in hospital room? 1  -CS      AM-PAC 6 Clicks Score (PT) 12  -CS              Functional Assessment    Outcome Measure Options AM-PAC 6 Clicks Basic Mobility (PT)  -CS            User Key  (r) = Recorded By, (t) = Taken By, (c) = Cosigned By    Initials Name Provider Type    CS Ronald Fabian, CURTIS Physical Therapy Assistant                 Time Calculation:    PT Charges     Row Name 12/08/21 1334             Time Calculation    Start Time 0908  -CS      PT Received On 12/08/21  -CS      PT Goal Re-Cert Due Date 12/14/21  -CS              Timed Charges    80036 - PT Therapeutic Exercise Minutes 14  -CS              Total Minutes    Timed Charges Total Minutes 14  -CS       Total Minutes 14  -CS            User Key  (r) = Recorded By, (t) = Taken By, (c) = Cosigned By    Initials Name Provider Type    CS Ronald Fabian, CURTIS Physical Therapy Assistant              Therapy Charges for Today     Code  Description Service Date Service Provider Modifiers Qty    [id number] HC PT THER PROC EA 15 MIN 12/8/2021 Ronald Fabian, PTA GP 1          PT G-Codes  Outcome Measure Options: AM-PAC 6 Clicks Basic Mobility (PT)  AM-PAC 6 Clicks Score (PT): 12    Ronald Fabian PTA  12/8/2021

## 2021-12-08 NOTE — CONSULTS
Nutrition Services    Patient Name:  Jose Shaikh  YOB: 1960  MRN: [id number]  Admit Date:  12/1/2021    RD consult for 7 day LOS:  Pt eating 100% meals.  BMI 46.79, class III obesity.  Already on carb consistent diet to help promote BG control & wt loss.  Recommend outpatient medical nutrition therapy for wt reduction & diabetes control    Electronically signed by:  Sharmin Romero RD  12/08/21 13:41 EST

## 2021-12-08 NOTE — PLAN OF CARE
Goal Outcome Evaluation:  Plan of Care Reviewed With: patient        Progress: improving  Outcome Summary: No significant changes with patient throughout shift. Patient is eager to go to rehab. Awaiting placement.

## 2021-12-08 NOTE — PROGRESS NOTES
[facility] - PODIATRY    Today's Date: 12/07/21    Patient Name: Jose Shaikh  MRN: [id number]  CSN: [id number]  PCP: Corazon Gr MD  Referring Provider: Yung Grijalva MD  Attending Provider: Yung Grijalva MD  Length of Stay: 6    SUBJECTIVE   Chief Complaint: Right foot osteomyelitis    HPI: Jose Shaikh, a 61 y.o.male,     Procedure: Right midfoot amputation  Date: 2 December 2021    Patient states they are doing well without complications.  Patient states they are following post-op instructions.  Patient states pain is controlled.  Patient states he is doing well postoperatively.    Patient denies any fevers, chills, nausea, vomiting, shortness of breathe, nor any other constitutional signs nor symptoms.      Patient states he waiting for rehabilitation bed.    Past Medical History:   Diagnosis Date   • Absence of toe of right foot    • Acute osteomyelitis of left calcaneus  8/18/2021   • Anxiety and depression    • Arthritis    • Claustrophobia    • Corns and callus    • Diabetic ulcer of left heel associated with type 2 DM 8/18/2021   • Diabetic ulcer of left heel associated with type 2 DM 7/6/2021   • Diabetic ulcer of right midfoot associated with type 2 DM 8/18/2021   • Difficulty walking    • Essential hypertension 8/31/2021   • Hammertoe    • Hyperlipidemia LDL goal <100 8/31/2021   • Ingrown toenail    • Obesity    • Paroxysmal atrial fibrillation 8/31/2021   • Polyneuropathy    • Pressure ulcer, stage 1    • Tinea unguium    • Type 2 diabetes mellitus with polyneuropathy      Past Surgical History:   Procedure Laterality Date   • CYST REMOVAL      center of back; benign   • INCISION AND DRAINAGE ABSCESS      back   • OTHER SURGICAL HISTORY      Surgical clips left foot   • TOE SURGERY Right     Removal of 5th toe   • TRANS METATARSAL AMPUTATION Right 12/2/2021    Procedure: AMPUTATION TRANS METATARSAL;  Surgeon: Ash Leyva DPM;  Location: [facility] MAIN OR;  Service:  Podiatry;  Laterality: Right;   • WRIST SURGERY Left     repair of injury     Family History   Problem Relation Age of Onset   • Heart disease Mother    • Heart disease Father    • Cancer Father         Unspecified   • Heart disease Brother      Social History     Socioeconomic History   • Marital status: Single   Tobacco Use   • Smoking status: Former Smoker     Packs/day: 0.00     Years: 1.00     Pack years: 0.00     Quit date: 1991     Years since quittin.2   • Smokeless tobacco: Never Used   • Tobacco comment: quit at age 30   Vaping Use   • Vaping Use: Never used   Substance and Sexual Activity   • Alcohol use: Yes     Comment: Rare   • Drug use: Yes     Types: Marijuana     Comment: Daily   • Sexual activity: Defer     Allergies   Allergen Reactions   • Adhesive Tape Rash     Current Facility-Administered Medications   Medication Dose Route Frequency Provider Last Rate Last Admin   • acetaminophen (TYLENOL) tablet 650 mg  650 mg Oral Q4H PRN Darryn Osullivan MD        Or   • acetaminophen (TYLENOL) suppository 650 mg  650 mg Rectal Q4H PRN Darryn Osullivan MD       • acetaminophen (TYLENOL) tablet 650 mg  650 mg Oral Q4H PRN Darryn Osullivan MD       • ALPRAZolam (XANAX) tablet 0.25 mg  0.25 mg Oral Q6H PRN Darryn Osullivan MD       • aluminum-magnesium hydroxide-simethicone (MAALOX MAX) 400-400-40 MG/5ML suspension 15 mL  15 mL Oral Q6H PRN Darryn Osullivan MD       • apixaban (ELIQUIS) tablet 5 mg  5 mg Oral Q12H Yung Grijalva MD       • atorvastatin (LIPITOR) tablet 10 mg  10 mg Oral Nightly Darryn Osullivan MD   10 mg at 21   • sennosides-docusate (PERICOLACE) 8.6-50 MG per tablet 1 tablet  1 tablet Oral BID Darryn Osullivan MD   1 tablet at 21 0814    And   • polyethylene glycol (MIRALAX) packet 17 g  17 g Oral Daily PRN Darryn Osullivan MD        And   • bisacodyl (DULCOLAX) EC tablet 5 mg  5 mg Oral Daily PRN Darryn Osullivan MD        And   • bisacodyl (DULCOLAX) suppository 10 mg  10  mg Rectal Daily PRN Darryn Osullivan MD       • buPROPion XL (WELLBUTRIN XL) 24 hr tablet 150 mg  150 mg Oral BID Darryn Osullivan MD   150 mg at 12/07/21 0814   • citalopram (CeleXA) tablet 10 mg  10 mg Oral Daily Darryn Osullivan MD   10 mg at 12/06/21 2112   • dextrose (GLUTOSE) oral gel 15 g  15 g Oral Q15 Min PRN Darryn Osullivan MD       • dextrose 10 % infusion  25 g Intravenous Q15 Min PRN Darryn Osullivan MD       • enoxaparin (LOVENOX) syringe 40 mg  40 mg Subcutaneous Q12H Yung Grijalva MD   40 mg at 12/07/21 1207   • famotidine (PEPCID) tablet 40 mg  40 mg Oral Daily Darryn Osullivan MD   40 mg at 12/07/21 0814   • glucagon (human recombinant) (GLUCAGEN DIAGNOSTIC) injection 1 mg  1 mg Subcutaneous Q15 Min PRN Darryn Osullivan MD       • HYDROcodone-acetaminophen (NORCO)  MG per tablet 1 tablet  1 tablet Oral Q4H PRN Darryn Osullivan MD   1 tablet at 12/05/21 1832   • HYDROcodone-acetaminophen (NORCO) 5-325 MG per tablet 1 tablet  1 tablet Oral Q4H PRN Darryn Osullivan MD   1 tablet at 12/06/21 1718   • insulin detemir (LEVEMIR) injection 25 Units  25 Units Subcutaneous Nightly Darryn Osullivan MD   25 Units at 12/06/21 2113   • insulin lispro (humaLOG) injection 0-9 Units  0-9 Units Subcutaneous 4x Daily With Meals & Nightly Darryn Osullivan MD   4 Units at 12/07/21 1206   • lisinopril (PRINIVIL,ZESTRIL) tablet 20 mg  20 mg Oral Daily Darryn Osullivan MD   20 mg at 12/07/21 0814   • metoprolol succinate XL (TOPROL-XL) 24 hr tablet 25 mg  25 mg Oral Q24H Shekhar Au MD   25 mg at 12/07/21 0813   • morphine injection 2 mg  2 mg Intravenous Q2H PRN Darryn Osullivan MD   2 mg at 12/05/21 2104   • naloxone (NARCAN) injection 0.4 mg  0.4 mg Intravenous Q5 Min PRN Darryn Osullivan MD       • ondansetron (ZOFRAN) injection 4 mg  4 mg Intravenous Q4H PRN Darryn Osullivan MD       • pregabalin (LYRICA) capsule 25 mg  25 mg Oral Q12H Yung Grijalva MD   25 mg at 12/07/21 0814   • sodium chloride 0.9 % flush 10 mL  10 mL  Intravenous Q12H Darryn Osullivan MD   10 mL at 12/07/21 0815   • sodium chloride 0.9 % flush 10 mL  10 mL Intravenous PRN Darryn Osullivan MD       • Sodium Hypochlorite (DAKIN'S) 0.125 % topical solution 0.125% (quarter strength)   Topical Q12H Darryn Osullivan MD   Given at 12/07/21 0815   • tamsulosin (FLOMAX) 24 hr capsule 0.4 mg  0.4 mg Oral Nightly Darryn Osullivan MD   0.4 mg at 12/06/21 2113   • temazepam (RESTORIL) capsule 15 mg  15 mg Oral Nightly PRN Darryn Osullivan MD         Review of Systems    OBJECTIVE     Vitals:    12/07/21 1910   BP: 117/64   Pulse: 67   Resp: 18   Temp: 98 °F (36.7 °C)   SpO2: 100%       PHYSICAL EXAM    GEN:   A&Ox3, NAD. Pt presents in hospital bed.     Neurovascular status unchanged    Ortho: Right midfoot amputation    Right midfoot shows sutures intact with skin edges well-coapted with no signs of dehiscence.  Small amount of bloody serous drainage present at midpoint of surgical incision site.  Healthy surgical skin edges.  Healthy dorsal and plantar surgical flaps.  No drainage present.  No edema, erythema, calor, lymphangitis, nor signs of infection seen.    Left plantar heel shows stage III ulceration with granular tissue present.  No signs of edema, erythema, lymphangitis, nor signs of infection.      LABORATORY/CULTURE RESULTS:  Results from last 7 days   Lab Units 12/05/21  0612 12/01/21  1204   WBC 10*3/mm3 7.58 7.19   HEMOGLOBIN g/dL 12.1* 13.9   HEMATOCRIT % 37.5 41.3   PLATELETS 10*3/mm3 107* 100*     Results from last 7 days   Lab Units 12/05/21  0612 12/01/21  1204   SODIUM mmol/L 136 134*   POTASSIUM mmol/L 4.5 4.5   CHLORIDE mmol/L 102 101   CO2 mmol/L 27.5 23.9   BUN mg/dL 10 9   CREATININE mg/dL 0.74* 0.69*   CALCIUM mg/dL 8.7 9.1   BILIRUBIN mg/dL 0.9 0.8   ALK PHOS U/L 195* 197*   ALT (SGPT) U/L 31 41   AST (SGOT) U/L 35 48*   GLUCOSE mg/dL 126* 143*         Microbiology Results (last 10 days)     ** No results found for the last 240 hours. **            ASSESSMENT/PLAN     Patient Active Problem List   Diagnosis   • Diabetic ulcer of left heel associated with type 2 DM   • Acute osteomyelitis of left calcaneus    • Diabetic ulcer of left heel associated with type 2 DM   • Diabetic ulcer of right midfoot associated with type 2 DM   • Paroxysmal atrial fibrillation   • Essential hypertension   • Hyperlipidemia LDL goal <100   • Cellulitis and abscess of foot   • Lactic acidosis       Comprehensive lower extremity examination and evaluation was performed.    Discussed findings and treatment plan including risks, benefits, and treatment options with patient in detail. Patient agreed with treatment plan.    Recommend:  -Rehabilitation placement for at least 1 month   -Daily dressing changes  -Strict nonweightbearing to right foot.    Discussed with patient's nurse.      Lab Frequency Next Occurrence   Lipid Panel Once 02/27/2022   WOUND CARE HYPERBARIC weekdays        This document has been electronically signed by Ash Leyva DPM on December 7, 2021 20:10 EST

## 2021-12-09 LAB
GLUCOSE BLDC GLUCOMTR-MCNC: 116 MG/DL (ref 70–99)
GLUCOSE BLDC GLUCOMTR-MCNC: 171 MG/DL (ref 70–99)
GLUCOSE BLDC GLUCOMTR-MCNC: 234 MG/DL (ref 70–99)
GLUCOSE BLDC GLUCOMTR-MCNC: 252 MG/DL (ref 70–99)
PROCALCITONIN SERPL-MCNC: 0.06 NG/ML (ref 0–0.25)

## 2021-12-09 PROCEDURE — S0260 H&P FOR SURGERY: HCPCS | Performed by: PODIATRIST

## 2021-12-09 PROCEDURE — 25010000002 PIPERACILLIN SOD-TAZOBACTAM PER 1 G: Performed by: INTERNAL MEDICINE

## 2021-12-09 PROCEDURE — 63710000001 INSULIN LISPRO (HUMAN) PER 5 UNITS: Performed by: INTERNAL MEDICINE

## 2021-12-09 PROCEDURE — 94760 N-INVAS EAR/PLS OXIMETRY 1: CPT

## 2021-12-09 PROCEDURE — 94799 UNLISTED PULMONARY SVC/PX: CPT

## 2021-12-09 PROCEDURE — 94761 N-INVAS EAR/PLS OXIMETRY MLT: CPT

## 2021-12-09 PROCEDURE — 82962 GLUCOSE BLOOD TEST: CPT

## 2021-12-09 PROCEDURE — 84145 PROCALCITONIN (PCT): CPT | Performed by: INTERNAL MEDICINE

## 2021-12-09 PROCEDURE — 63710000001 INSULIN DETEMIR PER 5 UNITS: Performed by: INTERNAL MEDICINE

## 2021-12-09 RX ADMIN — SENNOSIDES AND DOCUSATE SODIUM 1 TABLET: 50; 8.6 TABLET ORAL at 08:37

## 2021-12-09 RX ADMIN — PIPERACILLIN SODIUM AND TAZOBACTAM SODIUM 4.5 G: 4; .5 INJECTION, POWDER, LYOPHILIZED, FOR SOLUTION INTRAVENOUS at 20:06

## 2021-12-09 RX ADMIN — APIXABAN 5 MG: 5 TABLET, FILM COATED ORAL at 08:37

## 2021-12-09 RX ADMIN — INSULIN LISPRO 2 UNITS: 100 INJECTION, SOLUTION INTRAVENOUS; SUBCUTANEOUS at 11:59

## 2021-12-09 RX ADMIN — FAMOTIDINE 40 MG: 20 TABLET ORAL at 08:37

## 2021-12-09 RX ADMIN — BUPROPION HYDROCHLORIDE 150 MG: 150 TABLET, EXTENDED RELEASE ORAL at 08:37

## 2021-12-09 RX ADMIN — BUPROPION HYDROCHLORIDE 150 MG: 150 TABLET, EXTENDED RELEASE ORAL at 21:02

## 2021-12-09 RX ADMIN — SODIUM CHLORIDE, PRESERVATIVE FREE 10 ML: 5 INJECTION INTRAVENOUS at 08:38

## 2021-12-09 RX ADMIN — APIXABAN 5 MG: 5 TABLET, FILM COATED ORAL at 21:03

## 2021-12-09 RX ADMIN — HYDROCODONE BITARTRATE AND ACETAMINOPHEN 1 TABLET: 10; 325 TABLET ORAL at 20:06

## 2021-12-09 RX ADMIN — INSULIN DETEMIR 25 UNITS: 100 INJECTION, SOLUTION SUBCUTANEOUS at 21:03

## 2021-12-09 RX ADMIN — METOPROLOL SUCCINATE 25 MG: 25 TABLET, EXTENDED RELEASE ORAL at 08:37

## 2021-12-09 RX ADMIN — LISINOPRIL 20 MG: 20 TABLET ORAL at 08:37

## 2021-12-09 RX ADMIN — INSULIN LISPRO 6 UNITS: 100 INJECTION, SOLUTION INTRAVENOUS; SUBCUTANEOUS at 21:04

## 2021-12-09 RX ADMIN — ACETAMINOPHEN 650 MG: 325 TABLET ORAL at 08:42

## 2021-12-09 RX ADMIN — ATORVASTATIN CALCIUM 10 MG: 10 TABLET, FILM COATED ORAL at 21:02

## 2021-12-09 RX ADMIN — SODIUM CHLORIDE, PRESERVATIVE FREE 10 ML: 5 INJECTION INTRAVENOUS at 21:03

## 2021-12-09 RX ADMIN — SENNOSIDES AND DOCUSATE SODIUM 1 TABLET: 50; 8.6 TABLET ORAL at 21:03

## 2021-12-09 RX ADMIN — PREGABALIN 25 MG: 25 CAPSULE ORAL at 21:02

## 2021-12-09 RX ADMIN — TAMSULOSIN HYDROCHLORIDE 0.4 MG: 0.4 CAPSULE ORAL at 21:02

## 2021-12-09 RX ADMIN — INSULIN LISPRO 4 UNITS: 100 INJECTION, SOLUTION INTRAVENOUS; SUBCUTANEOUS at 17:12

## 2021-12-09 RX ADMIN — CITALOPRAM HYDROBROMIDE 10 MG: 20 TABLET ORAL at 21:02

## 2021-12-09 RX ADMIN — PREGABALIN 25 MG: 25 CAPSULE ORAL at 08:37

## 2021-12-09 RX ADMIN — Medication: at 08:39

## 2021-12-09 NOTE — PLAN OF CARE
Goal Outcome Evaluation:           Progress: no change  Outcome Summary: No significant changes this shift. right foot amputation incision site with redness and swelling. dressing changed. Patient tolerated well. awaitiing rehab placement.

## 2021-12-09 NOTE — SIGNIFICANT NOTE
12/09/21 1145   Wound 06/22/21 1133 Left lateral heel   Placement Date/Time: 06/22/21 1133   Present on Hospital Admission: Yes  Side: Left  Orientation: lateral  Location: heel   Wound Image    Dressing Appearance intact; moist drainage   Base slough; red; moist   Periwound pink; intact   Edges open   Wound Length (cm) 2.8 cm   Wound Width (cm) 1.9 cm   Wound Depth (cm) 0.8 cm   Drainage Characteristics/Odor yellow   Drainage Amount small   Care, Wound cleansed with; irrigated with; sterile normal saline   Dressing Care dressing applied; dressing moistened; foam; gauze; elastic bandage   Wound 12/02/21 Right anterior foot Incision   Placement Date: 12/02/21   Side: Right  Orientation: anterior  Location: foot  Primary Wound Type: Incision   Wound Image      Dressing Appearance moist drainage; intact   Closure Surface sutures   Base scab; red   Periwound redness; swelling   Periwound Temperature warm   Periwound Skin Turgor soft   Edges   (sutured)   Drainage Characteristics/Odor serosanguineous   Drainage Amount moderate   Care, Wound cleansed with; irrigated with; sterile normal saline   Dressing Care dressing applied; non-adherent; silver impregnated; foam; gauze; elastic bandage   Wound Check / Follow-up. Patient seen today for routine wound care follow-up. Patient is s/p transmetatarsal amputation on 12/02/21 to right foot. Dressing to right foot with serosanguinous drainage noted to medial aspect. Dressing removed. Serosanguinous drainage actively draining. Incision line from medial to lateral aspect with sutures present. Crusting along sutures. Tissue around incision line very warm, reddened and angry. Cleansed with NS and gauze. Non-adherent gauze and silver impregnated foam applied for protection, treatment and absorption and then secured with gauze roll and elastic bandage. Will notify MD of findings. Patient also with wound to left plantar heel. Wound with more slough this visit. Tissue is pink with  clear shiny coat over tissue. Cleansed and irrigated with NS. Deepest portion of wound with some darker discoloration more gray in color. Recommending to change dressing to fill wound with Hydrafera blue foam at this time. Once wound filled, secured with gauze roll and elastic bandage. Patient had small area to left great toe that had dried serosanguinous drainage but after cleansing no drainage noted. Patient is uncertain but thinks he may have done it on the bed at night or rubbed against wraps.   Bettye Pope RN

## 2021-12-09 NOTE — H&P (VIEW-ONLY)
[facility] - PODIATRY    Today's Date: 12/09/21    Patient Name: Jose Shaikh  MRN: [id number]  CSN: [id number]  PCP: Corazon Gr MD  Referring Provider: Yung Grijalva MD  Attending Provider: Yung Grijalva MD  Length of Stay: 8    SUBJECTIVE   Chief Complaint: Right foot osteomyelitis    HPI: Jose Shaikh, a 61 y.o.male,     Procedure: Right midfoot amputation  Date: 2 December 2021    Patient states he feels well but his right foot is turning edematous and red.    Patient denies any fevers, chills, nausea, vomiting, shortness of breathe, nor any other constitutional signs nor symptoms.        Past Medical History:   Diagnosis Date   • Absence of toe of right foot    • Acute osteomyelitis of left calcaneus  8/18/2021   • Anxiety and depression    • Arthritis    • Claustrophobia    • Corns and callus    • Diabetic ulcer of left heel associated with type 2 DM 8/18/2021   • Diabetic ulcer of left heel associated with type 2 DM 7/6/2021   • Diabetic ulcer of right midfoot associated with type 2 DM 8/18/2021   • Difficulty walking    • Essential hypertension 8/31/2021   • Hammertoe    • Hyperlipidemia LDL goal <100 8/31/2021   • Ingrown toenail    • Obesity    • Paroxysmal atrial fibrillation 8/31/2021   • Polyneuropathy    • Pressure ulcer, stage 1    • Tinea unguium    • Type 2 diabetes mellitus with polyneuropathy      Past Surgical History:   Procedure Laterality Date   • CYST REMOVAL      center of back; benign   • INCISION AND DRAINAGE ABSCESS      back   • OTHER SURGICAL HISTORY      Surgical clips left foot   • TOE SURGERY Right     Removal of 5th toe   • TRANS METATARSAL AMPUTATION Right 12/2/2021    Procedure: AMPUTATION TRANS METATARSAL;  Surgeon: Ash Leyva DPM;  Location: [facility] MAIN OR;  Service: Podiatry;  Laterality: Right;   • WRIST SURGERY Left     repair of injury     Family History   Problem Relation Age of Onset   • Heart disease Mother    • Heart disease Father     • Cancer Father         Unspecified   • Heart disease Brother      Social History     Socioeconomic History   • Marital status: Single   Tobacco Use   • Smoking status: Former Smoker     Packs/day: 0.00     Years: 1.00     Pack years: 0.00     Quit date: 1991     Years since quittin.2   • Smokeless tobacco: Never Used   • Tobacco comment: quit at age 30   Vaping Use   • Vaping Use: Never used   Substance and Sexual Activity   • Alcohol use: Yes     Comment: Rare   • Drug use: Yes     Types: Marijuana     Comment: Daily   • Sexual activity: Defer     Allergies   Allergen Reactions   • Adhesive Tape Rash     Current Facility-Administered Medications   Medication Dose Route Frequency Provider Last Rate Last Admin   • acetaminophen (TYLENOL) tablet 650 mg  650 mg Oral Q4H PRN Darryn Osullivan MD   650 mg at 2142    Or   • acetaminophen (TYLENOL) suppository 650 mg  650 mg Rectal Q4H PRN Darryn Osullivan MD       • acetaminophen (TYLENOL) tablet 650 mg  650 mg Oral Q4H PRN Darryn Osullivan MD       • aluminum-magnesium hydroxide-simethicone (MAALOX MAX) 400-400-40 MG/5ML suspension 15 mL  15 mL Oral Q6H PRN Darryn Osullivan MD       • apixaban (ELIQUIS) tablet 5 mg  5 mg Oral Q12H Yung Grijalva MD   5 mg at 21   • atorvastatin (LIPITOR) tablet 10 mg  10 mg Oral Nightly Darryn Osullivan MD   10 mg at 215   • sennosides-docusate (PERICOLACE) 8.6-50 MG per tablet 1 tablet  1 tablet Oral BID Darryn Osullivan MD   1 tablet at 2137    And   • polyethylene glycol (MIRALAX) packet 17 g  17 g Oral Daily PRN Darryn Osullivan MD        And   • bisacodyl (DULCOLAX) EC tablet 5 mg  5 mg Oral Daily PRN Darryn Osullivan MD        And   • bisacodyl (DULCOLAX) suppository 10 mg  10 mg Rectal Daily PRN Darryn Osullivan MD       • buPROPion XL (WELLBUTRIN XL) 24 hr tablet 150 mg  150 mg Oral BID Darryn Osullivan MD   150 mg at 2137   • citalopram (CeleXA) tablet 10 mg  10 mg Oral Daily  Darryn Osullivan MD   10 mg at 12/08/21 2135   • dextrose (GLUTOSE) oral gel 15 g  15 g Oral Q15 Min PRN Darryn Osullivan MD       • dextrose 10 % infusion  25 g Intravenous Q15 Min PRN Darryn Osullivan MD       • famotidine (PEPCID) tablet 40 mg  40 mg Oral Daily Darryn Osullivan MD   40 mg at 12/09/21 0837   • glucagon (human recombinant) (GLUCAGEN DIAGNOSTIC) injection 1 mg  1 mg Subcutaneous Q15 Min PRN Darryn Osullivan MD       • HYDROcodone-acetaminophen (NORCO)  MG per tablet 1 tablet  1 tablet Oral Q4H PRN Darryn Osullivan MD   1 tablet at 12/05/21 1832   • HYDROcodone-acetaminophen (NORCO) 5-325 MG per tablet 1 tablet  1 tablet Oral Q4H PRN Darryn Osullivan MD   1 tablet at 12/06/21 1718   • insulin detemir (LEVEMIR) injection 25 Units  25 Units Subcutaneous Nightly Darryn Osullivan MD   25 Units at 12/08/21 2134   • insulin lispro (humaLOG) injection 0-9 Units  0-9 Units Subcutaneous 4x Daily With Meals & Nightly Darryn Osullivan MD   4 Units at 12/09/21 1712   • lisinopril (PRINIVIL,ZESTRIL) tablet 20 mg  20 mg Oral Daily Darryn Osullivan MD   20 mg at 12/09/21 0837   • loperamide (IMODIUM) capsule 2 mg  2 mg Oral Q4H PRN Yung Grijalva MD       • metoprolol succinate XL (TOPROL-XL) 24 hr tablet 25 mg  25 mg Oral Q24H Shekhar Au MD   25 mg at 12/09/21 0837   • morphine injection 2 mg  2 mg Intravenous Q2H PRN Darryn Osullivan MD   2 mg at 12/05/21 2104   • naloxone (NARCAN) injection 0.4 mg  0.4 mg Intravenous Q5 Min PRN Darryn Osullivan MD       • ondansetron (ZOFRAN) injection 4 mg  4 mg Intravenous Q4H PRN Darryn Osullivan MD       • pregabalin (LYRICA) capsule 25 mg  25 mg Oral Q12H Yung Grijalva MD   25 mg at 12/09/21 0837   • sodium chloride 0.9 % flush 10 mL  10 mL Intravenous Q12H Darryn Osullivan MD   10 mL at 12/09/21 0838   • sodium chloride 0.9 % flush 10 mL  10 mL Intravenous PRN Darryn Osullivan MD       • tamsulosin (FLOMAX) 24 hr capsule 0.4 mg  0.4 mg Oral Nightly Darryn Osullivan MD   0.4 mg at  12/08/21 2134     Review of Systems    OBJECTIVE     Vitals:    12/09/21 1500   BP: 100/82   Pulse: 60   Resp: 16   Temp: 99 °F (37.2 °C)   SpO2: 95%       PHYSICAL EXAM    GEN:   A&Ox3, NAD. Pt presents in hospital bed.     Neurovascular status unchanged    Ortho: Right midfoot amputation    Right midfoot shows sutures intact.  Surgery is tender to palpation which is different than previous exam.  New onset edema and erythema compared to yesterday's exam.  Minimal amount of serous drainage present.  Edema and erythema limited to the midfoot.  No lymphangitis.  Fluctuance present on the medial aspect of the right midfoot.    Left plantar heel shows stage III ulceration with granular tissue present.  No signs of edema, erythema, lymphangitis, nor signs of infection.      LABORATORY/CULTURE RESULTS:  Results from last 7 days   Lab Units 12/05/21  0612   WBC 10*3/mm3 7.58   HEMOGLOBIN g/dL 12.1*   HEMATOCRIT % 37.5   PLATELETS 10*3/mm3 107*     Results from last 7 days   Lab Units 12/05/21  0612   SODIUM mmol/L 136   POTASSIUM mmol/L 4.5   CHLORIDE mmol/L 102   CO2 mmol/L 27.5   BUN mg/dL 10   CREATININE mg/dL 0.74*   CALCIUM mg/dL 8.7   BILIRUBIN mg/dL 0.9   ALK PHOS U/L 195*   ALT (SGPT) U/L 31   AST (SGOT) U/L 35   GLUCOSE mg/dL 126*         Microbiology Results (last 10 days)     ** No results found for the last 240 hours. **           ASSESSMENT/PLAN     Patient Active Problem List   Diagnosis   • Diabetic ulcer of left heel associated with type 2 DM   • Acute osteomyelitis of left calcaneus    • Diabetic ulcer of left heel associated with type 2 DM   • Diabetic ulcer of right midfoot associated with type 2 DM   • Paroxysmal atrial fibrillation   • Essential hypertension   • Hyperlipidemia LDL goal <100   • Cellulitis and abscess of foot   • Lactic acidosis       Comprehensive lower extremity examination and evaluation was performed.    Discussed findings and treatment plan including risks, benefits, and treatment  options with patient in detail. Patient agreed with treatment plan.    Plan: Surgical I&D of right midfoot.  The risks and benefits of the surgery were discussed with the patient.  This discussion included possible complications of requiring further surgery, possible delayed wound healing, further surgery requiring amputation of the foot or leg, and also included the complication of death.  All the patient's questions were answered to their satisfaction.  Patient states they would like to proceed with the procedure.  Upon discussion of non-surgical conservative option, surgical correction, post-operative requirements along with risk and benefits of the surgery along with expected outcomes, the patient states they would like to proceed with the scheduling surgery.    Surgical I&D of right midfoot is planned for 10 December 2021.    Plan was discussed with the patient's nurse and Dr. MAX Grijalva.    Discussed with patient's nurse.      Lab Frequency Next Occurrence   Lipid Panel Once 02/27/2022   WOUND CARE HYPERBARIC weekdays        This document has been electronically signed by Ash Leyva DPM on December 9, 2021 17:20 EST

## 2021-12-09 NOTE — PROGRESS NOTES
[facility]     Progress Note    Patient Name: Jose Shaikh  : 1960  MRN: [id number]  Primary Care Physician:  Corazon Gr MD  Date of admission: 2021      Subjective   Brief summary.  Patient admitted with nonhealing ulcer and osteomyelitis of the right foot      HPI:  Follow-up on osteomyelitis and nonhealing ulcer.    Patient post amputation doing well.  Feels much better less pain  Review of Systems     No fever chills.  Less pain today.  Blood sugar stable      Objective     Vitals:   Temp:  [98 °F (36.7 °C)-98.8 °F (37.1 °C)] 98 °F (36.7 °C)  Heart Rate:  [67-77] 69  Resp:  [18-20] 20  BP: (102-117)/(59-73) 102/67    Physical Exam :     Elderly male not in acute distress.  Neck supple.  Heart regular lungs clear  Abdomen obese  Right foot in surgical dressing  Extremities with trace edema      Result Review:  I have personally reviewed the results from the time of this admission to 2021 19:00 EST and agree with these findings:  [x]  Laboratory  []  Microbiology  []  Radiology  []  EKG/Telemetry   []  Cardiology/Vascular   []  Pathology  []  Old records  []  Other:           Assessment / Plan       Active Hospital Problems:  Active Hospital Problems    Diagnosis    • Cellulitis and abscess of foot    • Lactic acidosis    • Essential hypertension    • Diabetic ulcer of right midfoot associated with type 2 DM    • Diabetic ulcer of left heel associated with type 2 DM        Plan:     Continues to improve slowly.  Blood sugar stable  Discussed with , pre-CERT started, waiting for placement    DVT prophylaxis:  Medical DVT prophylaxis orders are present.    CODE STATUS:   Code Status (Patient has no pulse and is not breathing): CPR (Attempt to Resuscitate)  Medical Interventions (Patient has pulse or is breathing): Full Support              Electronically signed by Yung Grijalva MD, 21, 7:00 PM EST.

## 2021-12-09 NOTE — PLAN OF CARE
Goal Outcome Evaluation:  Plan of Care Reviewed With: patient        Progress: declining  Outcome Summary: Patient with possible infected surgical incision. Dr. Leyva to do surgery tomorrow afternoon. Patient to be NPO after breakfast in AM. DANYELL DE LOS SANTOS

## 2021-12-09 NOTE — PROGRESS NOTES
[facility] - PODIATRY    Today's Date: 12/09/21    Patient Name: Jose Shaikh  MRN: [id number]  CSN: [id number]  PCP: Corazon Gr MD  Referring Provider: Yung Grijalva MD  Attending Provider: Yung Grijalva MD  Length of Stay: 8    SUBJECTIVE   Chief Complaint: Right foot osteomyelitis    HPI: Jose Shaikh, a 61 y.o.male,     Procedure: Right midfoot amputation  Date: 2 December 2021    Patient states he feels well but his right foot is turning edematous and red.    Patient denies any fevers, chills, nausea, vomiting, shortness of breathe, nor any other constitutional signs nor symptoms.        Past Medical History:   Diagnosis Date   • Absence of toe of right foot    • Acute osteomyelitis of left calcaneus  8/18/2021   • Anxiety and depression    • Arthritis    • Claustrophobia    • Corns and callus    • Diabetic ulcer of left heel associated with type 2 DM 8/18/2021   • Diabetic ulcer of left heel associated with type 2 DM 7/6/2021   • Diabetic ulcer of right midfoot associated with type 2 DM 8/18/2021   • Difficulty walking    • Essential hypertension 8/31/2021   • Hammertoe    • Hyperlipidemia LDL goal <100 8/31/2021   • Ingrown toenail    • Obesity    • Paroxysmal atrial fibrillation 8/31/2021   • Polyneuropathy    • Pressure ulcer, stage 1    • Tinea unguium    • Type 2 diabetes mellitus with polyneuropathy      Past Surgical History:   Procedure Laterality Date   • CYST REMOVAL      center of back; benign   • INCISION AND DRAINAGE ABSCESS      back   • OTHER SURGICAL HISTORY      Surgical clips left foot   • TOE SURGERY Right     Removal of 5th toe   • TRANS METATARSAL AMPUTATION Right 12/2/2021    Procedure: AMPUTATION TRANS METATARSAL;  Surgeon: Ash Leyva DPM;  Location: [facility] MAIN OR;  Service: Podiatry;  Laterality: Right;   • WRIST SURGERY Left     repair of injury     Family History   Problem Relation Age of Onset   • Heart disease Mother    • Heart disease Father     • Cancer Father         Unspecified   • Heart disease Brother      Social History     Socioeconomic History   • Marital status: Single   Tobacco Use   • Smoking status: Former Smoker     Packs/day: 0.00     Years: 1.00     Pack years: 0.00     Quit date: 1991     Years since quittin.2   • Smokeless tobacco: Never Used   • Tobacco comment: quit at age 30   Vaping Use   • Vaping Use: Never used   Substance and Sexual Activity   • Alcohol use: Yes     Comment: Rare   • Drug use: Yes     Types: Marijuana     Comment: Daily   • Sexual activity: Defer     Allergies   Allergen Reactions   • Adhesive Tape Rash     Current Facility-Administered Medications   Medication Dose Route Frequency Provider Last Rate Last Admin   • acetaminophen (TYLENOL) tablet 650 mg  650 mg Oral Q4H PRN Darryn Osullivan MD   650 mg at 2142    Or   • acetaminophen (TYLENOL) suppository 650 mg  650 mg Rectal Q4H PRN Darryn Osullivan MD       • acetaminophen (TYLENOL) tablet 650 mg  650 mg Oral Q4H PRN Darryn Osullivan MD       • aluminum-magnesium hydroxide-simethicone (MAALOX MAX) 400-400-40 MG/5ML suspension 15 mL  15 mL Oral Q6H PRN Darryn Osullivan MD       • apixaban (ELIQUIS) tablet 5 mg  5 mg Oral Q12H Yung Grijalva MD   5 mg at 21   • atorvastatin (LIPITOR) tablet 10 mg  10 mg Oral Nightly Darryn Osullivan MD   10 mg at 215   • sennosides-docusate (PERICOLACE) 8.6-50 MG per tablet 1 tablet  1 tablet Oral BID Darryn Osullivan MD   1 tablet at 2137    And   • polyethylene glycol (MIRALAX) packet 17 g  17 g Oral Daily PRN Darryn Osullivan MD        And   • bisacodyl (DULCOLAX) EC tablet 5 mg  5 mg Oral Daily PRN Darryn Osullivan MD        And   • bisacodyl (DULCOLAX) suppository 10 mg  10 mg Rectal Daily PRN Darryn Osullivan MD       • buPROPion XL (WELLBUTRIN XL) 24 hr tablet 150 mg  150 mg Oral BID Darryn Osullivan MD   150 mg at 2137   • citalopram (CeleXA) tablet 10 mg  10 mg Oral Daily  Darryn Osullivan MD   10 mg at 12/08/21 2135   • dextrose (GLUTOSE) oral gel 15 g  15 g Oral Q15 Min PRN Darryn Osullivan MD       • dextrose 10 % infusion  25 g Intravenous Q15 Min PRN Darryn Osullivan MD       • famotidine (PEPCID) tablet 40 mg  40 mg Oral Daily Darryn Osullivan MD   40 mg at 12/09/21 0837   • glucagon (human recombinant) (GLUCAGEN DIAGNOSTIC) injection 1 mg  1 mg Subcutaneous Q15 Min PRN Darryn Osullivan MD       • HYDROcodone-acetaminophen (NORCO)  MG per tablet 1 tablet  1 tablet Oral Q4H PRN Darryn Osullivan MD   1 tablet at 12/05/21 1832   • HYDROcodone-acetaminophen (NORCO) 5-325 MG per tablet 1 tablet  1 tablet Oral Q4H PRN Darryn Osullivan MD   1 tablet at 12/06/21 1718   • insulin detemir (LEVEMIR) injection 25 Units  25 Units Subcutaneous Nightly Darryn Osullivan MD   25 Units at 12/08/21 2134   • insulin lispro (humaLOG) injection 0-9 Units  0-9 Units Subcutaneous 4x Daily With Meals & Nightly Darryn Osullivan MD   4 Units at 12/09/21 1712   • lisinopril (PRINIVIL,ZESTRIL) tablet 20 mg  20 mg Oral Daily Darryn Osullivan MD   20 mg at 12/09/21 0837   • loperamide (IMODIUM) capsule 2 mg  2 mg Oral Q4H PRN Yung Grijalva MD       • metoprolol succinate XL (TOPROL-XL) 24 hr tablet 25 mg  25 mg Oral Q24H Shekhar Au MD   25 mg at 12/09/21 0837   • morphine injection 2 mg  2 mg Intravenous Q2H PRN Darryn Osullivan MD   2 mg at 12/05/21 2104   • naloxone (NARCAN) injection 0.4 mg  0.4 mg Intravenous Q5 Min PRN Darryn Osullivan MD       • ondansetron (ZOFRAN) injection 4 mg  4 mg Intravenous Q4H PRN Darryn Osullivan MD       • pregabalin (LYRICA) capsule 25 mg  25 mg Oral Q12H Yung Grijalva MD   25 mg at 12/09/21 0837   • sodium chloride 0.9 % flush 10 mL  10 mL Intravenous Q12H Darryn Osullivan MD   10 mL at 12/09/21 0838   • sodium chloride 0.9 % flush 10 mL  10 mL Intravenous PRN Darryn Osullivan MD       • tamsulosin (FLOMAX) 24 hr capsule 0.4 mg  0.4 mg Oral Nightly Darryn Osullivan MD   0.4 mg at  12/08/21 2134     Review of Systems    OBJECTIVE     Vitals:    12/09/21 1500   BP: 100/82   Pulse: 60   Resp: 16   Temp: 99 °F (37.2 °C)   SpO2: 95%       PHYSICAL EXAM    GEN:   A&Ox3, NAD. Pt presents in hospital bed.     Neurovascular status unchanged    Ortho: Right midfoot amputation    Right midfoot shows sutures intact.  Surgery is tender to palpation which is different than previous exam.  New onset edema and erythema compared to yesterday's exam.  Minimal amount of serous drainage present.  Edema and erythema limited to the midfoot.  No lymphangitis.  Fluctuance present on the medial aspect of the right midfoot.    Left plantar heel shows stage III ulceration with granular tissue present.  No signs of edema, erythema, lymphangitis, nor signs of infection.      LABORATORY/CULTURE RESULTS:  Results from last 7 days   Lab Units 12/05/21  0612   WBC 10*3/mm3 7.58   HEMOGLOBIN g/dL 12.1*   HEMATOCRIT % 37.5   PLATELETS 10*3/mm3 107*     Results from last 7 days   Lab Units 12/05/21  0612   SODIUM mmol/L 136   POTASSIUM mmol/L 4.5   CHLORIDE mmol/L 102   CO2 mmol/L 27.5   BUN mg/dL 10   CREATININE mg/dL 0.74*   CALCIUM mg/dL 8.7   BILIRUBIN mg/dL 0.9   ALK PHOS U/L 195*   ALT (SGPT) U/L 31   AST (SGOT) U/L 35   GLUCOSE mg/dL 126*         Microbiology Results (last 10 days)     ** No results found for the last 240 hours. **           ASSESSMENT/PLAN     Patient Active Problem List   Diagnosis   • Diabetic ulcer of left heel associated with type 2 DM   • Acute osteomyelitis of left calcaneus    • Diabetic ulcer of left heel associated with type 2 DM   • Diabetic ulcer of right midfoot associated with type 2 DM   • Paroxysmal atrial fibrillation   • Essential hypertension   • Hyperlipidemia LDL goal <100   • Cellulitis and abscess of foot   • Lactic acidosis       Comprehensive lower extremity examination and evaluation was performed.    Discussed findings and treatment plan including risks, benefits, and treatment  options with patient in detail. Patient agreed with treatment plan.    Plan: Surgical I&D of right midfoot.  The risks and benefits of the surgery were discussed with the patient.  This discussion included possible complications of requiring further surgery, possible delayed wound healing, further surgery requiring amputation of the foot or leg, and also included the complication of death.  All the patient's questions were answered to their satisfaction.  Patient states they would like to proceed with the procedure.  Upon discussion of non-surgical conservative option, surgical correction, post-operative requirements along with risk and benefits of the surgery along with expected outcomes, the patient states they would like to proceed with the scheduling surgery.    Surgical I&D of right midfoot is planned for 10 December 2021.    Plan was discussed with the patient's nurse and Dr. MAX Grijalva.    Discussed with patient's nurse.      Lab Frequency Next Occurrence   Lipid Panel Once 02/27/2022   WOUND CARE HYPERBARIC weekdays        This document has been electronically signed by Ash Leyva DPM on December 9, 2021 17:20 EST

## 2021-12-09 NOTE — NURSING NOTE
Dr. Leyva notified of assessment and wound findings today. He will see patient and re-evaluate.   Bettye Pope RN

## 2021-12-10 ENCOUNTER — ANESTHESIA EVENT (OUTPATIENT)
Dept: PERIOP | Facility: HOSPITAL | Age: 61
End: 2021-12-10

## 2021-12-10 ENCOUNTER — ANESTHESIA (OUTPATIENT)
Dept: PERIOP | Facility: HOSPITAL | Age: 61
End: 2021-12-10

## 2021-12-10 LAB
ALBUMIN SERPL-MCNC: 2.5 G/DL (ref 3.5–5.2)
ALBUMIN/GLOB SERPL: 0.7 G/DL
ALP SERPL-CCNC: 327 U/L (ref 39–117)
ALT SERPL W P-5'-P-CCNC: 50 U/L (ref 1–41)
ANION GAP SERPL CALCULATED.3IONS-SCNC: 7.9 MMOL/L (ref 5–15)
AST SERPL-CCNC: 62 U/L (ref 1–40)
BASOPHILS # BLD AUTO: 0.04 10*3/MM3 (ref 0–0.2)
BASOPHILS NFR BLD AUTO: 0.5 % (ref 0–1.5)
BILIRUB SERPL-MCNC: 0.8 MG/DL (ref 0–1.2)
BUN SERPL-MCNC: 9 MG/DL (ref 8–23)
BUN/CREAT SERPL: 11.1 (ref 7–25)
CALCIUM SPEC-SCNC: 8.3 MG/DL (ref 8.6–10.5)
CHLORIDE SERPL-SCNC: 102 MMOL/L (ref 98–107)
CO2 SERPL-SCNC: 24.1 MMOL/L (ref 22–29)
CREAT SERPL-MCNC: 0.81 MG/DL (ref 0.76–1.27)
D-LACTATE SERPL-SCNC: 1 MMOL/L (ref 0.5–2)
DEPRECATED RDW RBC AUTO: 45.1 FL (ref 37–54)
EOSINOPHIL # BLD AUTO: 0.13 10*3/MM3 (ref 0–0.4)
EOSINOPHIL NFR BLD AUTO: 1.6 % (ref 0.3–6.2)
ERYTHROCYTE [DISTWIDTH] IN BLOOD BY AUTOMATED COUNT: 14.5 % (ref 12.3–15.4)
GFR SERPL CREATININE-BSD FRML MDRD: 96 ML/MIN/1.73
GLOBULIN UR ELPH-MCNC: 3.8 GM/DL
GLUCOSE BLDC GLUCOMTR-MCNC: 111 MG/DL (ref 70–99)
GLUCOSE BLDC GLUCOMTR-MCNC: 127 MG/DL (ref 70–99)
GLUCOSE BLDC GLUCOMTR-MCNC: 147 MG/DL (ref 70–99)
GLUCOSE BLDC GLUCOMTR-MCNC: 186 MG/DL (ref 70–99)
GLUCOSE BLDC GLUCOMTR-MCNC: 301 MG/DL (ref 70–99)
GLUCOSE SERPL-MCNC: 149 MG/DL (ref 65–99)
HCT VFR BLD AUTO: 34.4 % (ref 37.5–51)
HGB BLD-MCNC: 11.4 G/DL (ref 13–17.7)
IMM GRANULOCYTES # BLD AUTO: 0.04 10*3/MM3 (ref 0–0.05)
IMM GRANULOCYTES NFR BLD AUTO: 0.5 % (ref 0–0.5)
LYMPHOCYTES # BLD AUTO: 1.09 10*3/MM3 (ref 0.7–3.1)
LYMPHOCYTES NFR BLD AUTO: 13.3 % (ref 19.6–45.3)
MCH RBC QN AUTO: 28.3 PG (ref 26.6–33)
MCHC RBC AUTO-ENTMCNC: 33.1 G/DL (ref 31.5–35.7)
MCV RBC AUTO: 85.4 FL (ref 79–97)
MONOCYTES # BLD AUTO: 0.89 10*3/MM3 (ref 0.1–0.9)
MONOCYTES NFR BLD AUTO: 10.9 % (ref 5–12)
NEUTROPHILS NFR BLD AUTO: 5.98 10*3/MM3 (ref 1.7–7)
NEUTROPHILS NFR BLD AUTO: 73.2 % (ref 42.7–76)
NRBC BLD AUTO-RTO: 0 /100 WBC (ref 0–0.2)
PLATELET # BLD AUTO: 129 10*3/MM3 (ref 140–450)
PMV BLD AUTO: 10.9 FL (ref 6–12)
POTASSIUM SERPL-SCNC: 4.3 MMOL/L (ref 3.5–5.2)
PROT SERPL-MCNC: 6.3 G/DL (ref 6–8.5)
RBC # BLD AUTO: 4.03 10*6/MM3 (ref 4.14–5.8)
RBC MORPH BLD: NORMAL
SMALL PLATELETS BLD QL SMEAR: NORMAL
SODIUM SERPL-SCNC: 134 MMOL/L (ref 136–145)
WBC MORPH BLD: NORMAL
WBC NRBC COR # BLD: 8.17 10*3/MM3 (ref 3.4–10.8)

## 2021-12-10 PROCEDURE — 82962 GLUCOSE BLOOD TEST: CPT

## 2021-12-10 PROCEDURE — 83605 ASSAY OF LACTIC ACID: CPT | Performed by: INTERNAL MEDICINE

## 2021-12-10 PROCEDURE — 25010000002 PIPERACILLIN SOD-TAZOBACTAM PER 1 G

## 2021-12-10 PROCEDURE — 94799 UNLISTED PULMONARY SVC/PX: CPT

## 2021-12-10 PROCEDURE — 25010000002 PROPOFOL 10 MG/ML EMULSION: Performed by: NURSE ANESTHETIST, CERTIFIED REGISTERED

## 2021-12-10 PROCEDURE — 28003 TREATMENT OF FOOT INFECTION: CPT | Performed by: PODIATRIST

## 2021-12-10 PROCEDURE — 94760 N-INVAS EAR/PLS OXIMETRY 1: CPT

## 2021-12-10 PROCEDURE — 85025 COMPLETE CBC W/AUTO DIFF WBC: CPT | Performed by: INTERNAL MEDICINE

## 2021-12-10 PROCEDURE — 25010000002 MIDAZOLAM PER 1MG: Performed by: ANESTHESIOLOGY

## 2021-12-10 PROCEDURE — 63710000001 INSULIN LISPRO (HUMAN) PER 5 UNITS: Performed by: PODIATRIST

## 2021-12-10 PROCEDURE — 80053 COMPREHEN METABOLIC PANEL: CPT | Performed by: INTERNAL MEDICINE

## 2021-12-10 PROCEDURE — 25010000002 PIPERACILLIN SOD-TAZOBACTAM PER 1 G: Performed by: PODIATRIST

## 2021-12-10 PROCEDURE — 25010000002 PIPERACILLIN SOD-TAZOBACTAM PER 1 G: Performed by: INTERNAL MEDICINE

## 2021-12-10 PROCEDURE — 87070 CULTURE OTHR SPECIMN AEROBIC: CPT | Performed by: PODIATRIST

## 2021-12-10 PROCEDURE — 87076 CULTURE ANAEROBE IDENT EACH: CPT | Performed by: PODIATRIST

## 2021-12-10 PROCEDURE — 0J9Q0ZZ DRAINAGE OF RIGHT FOOT SUBCUTANEOUS TISSUE AND FASCIA, OPEN APPROACH: ICD-10-PCS | Performed by: PODIATRIST

## 2021-12-10 PROCEDURE — 85007 BL SMEAR W/DIFF WBC COUNT: CPT | Performed by: INTERNAL MEDICINE

## 2021-12-10 PROCEDURE — 25010000002 FENTANYL CITRATE (PF) 50 MCG/ML SOLUTION: Performed by: NURSE ANESTHETIST, CERTIFIED REGISTERED

## 2021-12-10 PROCEDURE — 25010000002 MORPHINE PER 10 MG: Performed by: INTERNAL MEDICINE

## 2021-12-10 PROCEDURE — 87205 SMEAR GRAM STAIN: CPT | Performed by: PODIATRIST

## 2021-12-10 PROCEDURE — 63710000001 INSULIN LISPRO (HUMAN) PER 5 UNITS: Performed by: INTERNAL MEDICINE

## 2021-12-10 PROCEDURE — 25010000002 PROCHLORPERAZINE 10 MG/2ML SOLUTION: Performed by: ANESTHESIOLOGY

## 2021-12-10 PROCEDURE — 63710000001 INSULIN DETEMIR PER 5 UNITS: Performed by: PODIATRIST

## 2021-12-10 PROCEDURE — 87075 CULTR BACTERIA EXCEPT BLOOD: CPT | Performed by: PODIATRIST

## 2021-12-10 RX ORDER — ONDANSETRON 2 MG/ML
4 INJECTION INTRAMUSCULAR; INTRAVENOUS EVERY 6 HOURS PRN
Status: DISCONTINUED | OUTPATIENT
Start: 2021-12-10 | End: 2021-12-21 | Stop reason: HOSPADM

## 2021-12-10 RX ORDER — PROPOFOL 10 MG/ML
VIAL (ML) INTRAVENOUS AS NEEDED
Status: DISCONTINUED | OUTPATIENT
Start: 2021-12-10 | End: 2021-12-10 | Stop reason: SURG

## 2021-12-10 RX ORDER — PROMETHAZINE HYDROCHLORIDE 12.5 MG/1
25 TABLET ORAL ONCE AS NEEDED
Status: DISCONTINUED | OUTPATIENT
Start: 2021-12-10 | End: 2021-12-10 | Stop reason: HOSPADM

## 2021-12-10 RX ORDER — ACETAMINOPHEN 325 MG/1
650 TABLET ORAL EVERY 4 HOURS PRN
Status: DISCONTINUED | OUTPATIENT
Start: 2021-12-10 | End: 2021-12-10

## 2021-12-10 RX ORDER — PROMETHAZINE HYDROCHLORIDE 25 MG/1
25 SUPPOSITORY RECTAL ONCE AS NEEDED
Status: DISCONTINUED | OUTPATIENT
Start: 2021-12-10 | End: 2021-12-10 | Stop reason: HOSPADM

## 2021-12-10 RX ORDER — OXYCODONE HYDROCHLORIDE 5 MG/1
5 TABLET ORAL
Status: DISCONTINUED | OUTPATIENT
Start: 2021-12-10 | End: 2021-12-10 | Stop reason: HOSPADM

## 2021-12-10 RX ORDER — MIDAZOLAM HYDROCHLORIDE 2 MG/2ML
2 INJECTION, SOLUTION INTRAMUSCULAR; INTRAVENOUS ONCE
Status: COMPLETED | OUTPATIENT
Start: 2021-12-10 | End: 2021-12-10

## 2021-12-10 RX ORDER — MAGNESIUM HYDROXIDE 1200 MG/15ML
LIQUID ORAL AS NEEDED
Status: DISCONTINUED | OUTPATIENT
Start: 2021-12-10 | End: 2021-12-10 | Stop reason: HOSPADM

## 2021-12-10 RX ORDER — ONDANSETRON 2 MG/ML
4 INJECTION INTRAMUSCULAR; INTRAVENOUS ONCE AS NEEDED
Status: DISCONTINUED | OUTPATIENT
Start: 2021-12-10 | End: 2021-12-10 | Stop reason: HOSPADM

## 2021-12-10 RX ORDER — HYDROCODONE BITARTRATE AND ACETAMINOPHEN 10; 325 MG/1; MG/1
1 TABLET ORAL EVERY 4 HOURS PRN
Status: DISPENSED | OUTPATIENT
Start: 2021-12-10 | End: 2021-12-17

## 2021-12-10 RX ORDER — PROCHLORPERAZINE EDISYLATE 5 MG/ML
5 INJECTION INTRAMUSCULAR; INTRAVENOUS ONCE
Status: COMPLETED | OUTPATIENT
Start: 2021-12-10 | End: 2021-12-10

## 2021-12-10 RX ORDER — ACETAMINOPHEN 650 MG/1
650 SUPPOSITORY RECTAL EVERY 4 HOURS PRN
Status: DISCONTINUED | OUTPATIENT
Start: 2021-12-10 | End: 2021-12-10

## 2021-12-10 RX ORDER — HYDROCODONE BITARTRATE AND ACETAMINOPHEN 5; 325 MG/1; MG/1
1 TABLET ORAL EVERY 4 HOURS PRN
Status: DISPENSED | OUTPATIENT
Start: 2021-12-10 | End: 2021-12-17

## 2021-12-10 RX ORDER — MORPHINE SULFATE 2 MG/ML
2 INJECTION, SOLUTION INTRAMUSCULAR; INTRAVENOUS
Status: DISPENSED | OUTPATIENT
Start: 2021-12-10 | End: 2021-12-17

## 2021-12-10 RX ORDER — HYDROCODONE BITARTRATE AND ACETAMINOPHEN 5; 325 MG/1; MG/1
1 TABLET ORAL EVERY 6 HOURS PRN
Status: DISCONTINUED | OUTPATIENT
Start: 2021-12-10 | End: 2021-12-10

## 2021-12-10 RX ORDER — SODIUM CHLORIDE 9 MG/ML
INJECTION, SOLUTION INTRAVENOUS CONTINUOUS PRN
Status: DISCONTINUED | OUTPATIENT
Start: 2021-12-10 | End: 2021-12-10 | Stop reason: SURG

## 2021-12-10 RX ORDER — MEPERIDINE HYDROCHLORIDE 25 MG/ML
12.5 INJECTION INTRAMUSCULAR; INTRAVENOUS; SUBCUTANEOUS
Status: DISCONTINUED | OUTPATIENT
Start: 2021-12-10 | End: 2021-12-10 | Stop reason: HOSPADM

## 2021-12-10 RX ORDER — LIDOCAINE HYDROCHLORIDE 20 MG/ML
INJECTION, SOLUTION INFILTRATION; PERINEURAL AS NEEDED
Status: DISCONTINUED | OUTPATIENT
Start: 2021-12-10 | End: 2021-12-10 | Stop reason: SURG

## 2021-12-10 RX ORDER — SODIUM CHLORIDE, SODIUM LACTATE, POTASSIUM CHLORIDE, CALCIUM CHLORIDE 600; 310; 30; 20 MG/100ML; MG/100ML; MG/100ML; MG/100ML
9 INJECTION, SOLUTION INTRAVENOUS CONTINUOUS PRN
Status: DISCONTINUED | OUTPATIENT
Start: 2021-12-10 | End: 2021-12-21 | Stop reason: HOSPADM

## 2021-12-10 RX ORDER — FENTANYL CITRATE 50 UG/ML
INJECTION, SOLUTION INTRAMUSCULAR; INTRAVENOUS AS NEEDED
Status: DISCONTINUED | OUTPATIENT
Start: 2021-12-10 | End: 2021-12-10 | Stop reason: SURG

## 2021-12-10 RX ORDER — PHENYLEPHRINE HCL IN 0.9% NACL 1 MG/10 ML
SYRINGE (ML) INTRAVENOUS AS NEEDED
Status: DISCONTINUED | OUTPATIENT
Start: 2021-12-10 | End: 2021-12-10 | Stop reason: SURG

## 2021-12-10 RX ORDER — METOCLOPRAMIDE HYDROCHLORIDE 5 MG/ML
10 INJECTION INTRAMUSCULAR; INTRAVENOUS ONCE AS NEEDED
Status: DISCONTINUED | OUTPATIENT
Start: 2021-12-10 | End: 2021-12-10 | Stop reason: HOSPADM

## 2021-12-10 RX ORDER — BUPIVACAINE HYDROCHLORIDE 2.5 MG/ML
INJECTION, SOLUTION EPIDURAL; INFILTRATION; INTRACAUDAL AS NEEDED
Status: DISCONTINUED | OUTPATIENT
Start: 2021-12-10 | End: 2021-12-10 | Stop reason: HOSPADM

## 2021-12-10 RX ORDER — ONDANSETRON 4 MG/1
4 TABLET, FILM COATED ORAL EVERY 6 HOURS PRN
Status: DISCONTINUED | OUTPATIENT
Start: 2021-12-10 | End: 2021-12-21 | Stop reason: HOSPADM

## 2021-12-10 RX ADMIN — INSULIN LISPRO 7 UNITS: 100 INJECTION, SOLUTION INTRAVENOUS; SUBCUTANEOUS at 11:29

## 2021-12-10 RX ADMIN — APIXABAN 5 MG: 5 TABLET, FILM COATED ORAL at 22:06

## 2021-12-10 RX ADMIN — LISINOPRIL 20 MG: 20 TABLET ORAL at 08:02

## 2021-12-10 RX ADMIN — FENTANYL CITRATE 100 MCG: 50 INJECTION, SOLUTION INTRAMUSCULAR; INTRAVENOUS at 17:56

## 2021-12-10 RX ADMIN — PREGABALIN 25 MG: 25 CAPSULE ORAL at 22:04

## 2021-12-10 RX ADMIN — PIPERACILLIN SODIUM AND TAZOBACTAM SODIUM 4.5 G: 4; .5 INJECTION, POWDER, LYOPHILIZED, FOR SOLUTION INTRAVENOUS at 10:17

## 2021-12-10 RX ADMIN — SODIUM CHLORIDE, PRESERVATIVE FREE 10 ML: 5 INJECTION INTRAVENOUS at 22:07

## 2021-12-10 RX ADMIN — ATORVASTATIN CALCIUM 10 MG: 10 TABLET, FILM COATED ORAL at 22:06

## 2021-12-10 RX ADMIN — TAMSULOSIN HYDROCHLORIDE 0.4 MG: 0.4 CAPSULE ORAL at 22:05

## 2021-12-10 RX ADMIN — FAMOTIDINE 40 MG: 20 TABLET ORAL at 08:01

## 2021-12-10 RX ADMIN — PIPERACILLIN SODIUM AND TAZOBACTAM SODIUM 4.5 G: 4; .5 INJECTION, POWDER, LYOPHILIZED, FOR SOLUTION INTRAVENOUS at 02:41

## 2021-12-10 RX ADMIN — INSULIN LISPRO 2 UNITS: 100 INJECTION, SOLUTION INTRAVENOUS; SUBCUTANEOUS at 22:07

## 2021-12-10 RX ADMIN — SENNOSIDES AND DOCUSATE SODIUM 1 TABLET: 50; 8.6 TABLET ORAL at 08:01

## 2021-12-10 RX ADMIN — PROPOFOL 100 MCG/KG/MIN: 10 INJECTION, EMULSION INTRAVENOUS at 17:56

## 2021-12-10 RX ADMIN — BUPROPION HYDROCHLORIDE 150 MG: 150 TABLET, EXTENDED RELEASE ORAL at 08:02

## 2021-12-10 RX ADMIN — MIDAZOLAM HYDROCHLORIDE 2 MG: 1 INJECTION, SOLUTION INTRAMUSCULAR; INTRAVENOUS at 17:15

## 2021-12-10 RX ADMIN — INSULIN DETEMIR 25 UNITS: 100 INJECTION, SOLUTION SUBCUTANEOUS at 22:07

## 2021-12-10 RX ADMIN — BUPROPION HYDROCHLORIDE 150 MG: 150 TABLET, EXTENDED RELEASE ORAL at 22:05

## 2021-12-10 RX ADMIN — METOPROLOL SUCCINATE 25 MG: 25 TABLET, EXTENDED RELEASE ORAL at 08:01

## 2021-12-10 RX ADMIN — MORPHINE SULFATE 2 MG: 2 INJECTION, SOLUTION INTRAMUSCULAR; INTRAVENOUS at 11:30

## 2021-12-10 RX ADMIN — SENNOSIDES AND DOCUSATE SODIUM 1 TABLET: 50; 8.6 TABLET ORAL at 22:05

## 2021-12-10 RX ADMIN — PREGABALIN 25 MG: 25 CAPSULE ORAL at 08:02

## 2021-12-10 RX ADMIN — PIPERACILLIN SODIUM AND TAZOBACTAM SODIUM 4.5 G: 4; .5 INJECTION, POWDER, LYOPHILIZED, FOR SOLUTION INTRAVENOUS at 22:12

## 2021-12-10 RX ADMIN — LIDOCAINE HYDROCHLORIDE 100 MG: 20 INJECTION, SOLUTION INFILTRATION; PERINEURAL at 17:56

## 2021-12-10 RX ADMIN — Medication 200 MCG: at 18:23

## 2021-12-10 RX ADMIN — SODIUM CHLORIDE: 9 INJECTION, SOLUTION INTRAVENOUS at 17:47

## 2021-12-10 RX ADMIN — PROPOFOL 50 MG: 10 INJECTION, EMULSION INTRAVENOUS at 17:58

## 2021-12-10 RX ADMIN — PROCHLORPERAZINE EDISYLATE 5 MG: 5 INJECTION INTRAMUSCULAR; INTRAVENOUS at 17:24

## 2021-12-10 RX ADMIN — SODIUM CHLORIDE, PRESERVATIVE FREE 10 ML: 5 INJECTION INTRAVENOUS at 08:02

## 2021-12-10 RX ADMIN — CITALOPRAM HYDROBROMIDE 10 MG: 20 TABLET ORAL at 22:05

## 2021-12-10 RX ADMIN — PIPERACILLIN SODIUM AND TAZOBACTAM SODIUM 4.5 G: 4; .5 INJECTION, POWDER, LYOPHILIZED, FOR SOLUTION INTRAVENOUS at 17:50

## 2021-12-10 RX ADMIN — MORPHINE SULFATE 2 MG: 2 INJECTION, SOLUTION INTRAMUSCULAR; INTRAVENOUS at 07:51

## 2021-12-10 NOTE — PROGRESS NOTES
[facility]     Progress Note    Patient Name: Jose Shaikh  : 1960  MRN: [id number]  Primary Care Physician:  Corazon Gr MD  Date of admission: 2021      Subjective   Brief summary.  Patient admitted with nonhealing ulcer and osteomyelitis of the right foot      HPI:  Follow-up on osteomyelitis and nonhealing ulcer.    Feels much better less pain.  RN reports increasing swelling and redness in the surgical site.  Notified podiatrist.        Review of Systems     No fever chills.  Less pain today.  Blood sugar stable  Swelling and redness at the surgical site    Objective     Vitals:   Temp:  [97.9 °F (36.6 °C)-99 °F (37.2 °C)] 99 °F (37.2 °C)  Heart Rate:  [60-74] 60  Resp:  [16-20] 16  BP: (100-117)/(59-90) 100/82    Physical Exam :     Elderly male not in acute distress.  Neck supple.  Heart regular lungs clear  Abdomen obese  Right foot in surgical dressing  Extremities with some edema of the right foot      Result Review:  I have personally reviewed the results from the time of this admission to 2021 19:06 EST and agree with these findings:  [x]  Laboratory  []  Microbiology  []  Radiology  []  EKG/Telemetry   []  Cardiology/Vascular   []  Pathology  []  Old records  []  Other:           Assessment / Plan       Active Hospital Problems:  Active Hospital Problems    Diagnosis    • Cellulitis and abscess of foot    • Lactic acidosis    • Essential hypertension    • Diabetic ulcer of right midfoot associated with type 2 DM    • Diabetic ulcer of left heel associated with type 2 DM        Plan:     Patient feels better but surgical site and leg looks slightly worse.  Podiatrist to see patient today.  Plan for exploration and surgery tomorrow.  Continue current care.  Restart Zosyn  Check labs in a.m..      DVT prophylaxis:  Medical DVT prophylaxis orders are present.    CODE STATUS:   Code Status (Patient has no pulse and is not breathing): CPR (Attempt to Resuscitate)  Medical  Interventions (Patient has pulse or is breathing): Full Support                Electronically signed by Yung Grijalva MD, 12/09/21, 7:08 PM EST.

## 2021-12-10 NOTE — PLAN OF CARE
Goal Outcome Evaluation:  Plan of Care Reviewed With: patient        Progress: no change  Outcome Summary: pt was npo after breakfast today, pt taken to surgery around 5pm for I&D to right foot,  pain meds given twice this shift, VSS

## 2021-12-10 NOTE — PROGRESS NOTES
[facility]     Progress Note    Patient Name: Jose Shaikh  : 1960  MRN: [id number]  Primary Care Physician:  Corazon Gr MD  Date of admission: 2021      Subjective   Brief summary.  Patient admitted with nonhealing ulcer of the right foot, status post midfoot amputation.      HPI:  Follow-up on foot infection.  Patient going for surgery again today for exploration and wound irrigation.  Wound infection gotten worse.  No fever chills.  Pain tolerable    Review of Systems     No nausea vomiting.  No fever chills.  No hypo or hyperglycemia      Objective     Vitals:   Temp:  [98.4 °F (36.9 °C)-99 °F (37.2 °C)] 98.6 °F (37 °C)  Heart Rate:  [60-70] 62  Resp:  [16] 16  BP: (100-143)/(61-82) 143/66    Physical Exam :       Elderly male not in acute distress.  Morbidly obese  Neck supple.  Heart regular lungs clear  Abdomen obese  Right foot in surgical dressing  Extremities with some edema of the right foot    Result Review:  I have personally reviewed the results from the time of this admission to 12/10/2021 11:26 EST and agree with these findings:  [x]  Laboratory  []  Microbiology  []  Radiology  []  EKG/Telemetry   []  Cardiology/Vascular   []  Pathology  []  Old records  []  Other:           Assessment / Plan       Active Hospital Problems:  Active Hospital Problems    Diagnosis    • Cellulitis and abscess of foot    • Lactic acidosis    • Essential hypertension    • Diabetic ulcer of right midfoot associated with type 2 DM    • Diabetic ulcer of left heel associated with type 2 DM        Plan:   Stable.  Restart pain meds(renew expiring orders).  Continue antibiotic.  Surgery today  Discharge to nursing home/rehab next week       DVT prophylaxis:  Medical DVT prophylaxis orders are present.    CODE STATUS:   Code Status (Patient has no pulse and is not breathing): CPR (Attempt to Resuscitate)  Medical Interventions (Patient has pulse or is breathing): Full  Support            Electronically signed by Yung Grijalva MD, 12/10/21, 11:26 AM EST.

## 2021-12-10 NOTE — ANESTHESIA POSTPROCEDURE EVALUATION
Patient: Jose Shaikh    Procedure Summary     Date: 12/10/21 Room / Location: [facility] OR 06 / [facility] MAIN OR    Anesthesia Start: 1749 Anesthesia Stop: 1833    Procedure: INCISION AND DRAINAGE LOWER EXTREMITY (Right ) Diagnosis:       Cellulitis and abscess of foot      (Cellulitis and abscess of foot [L03.119, L02.619])    Surgeons: Ash Leyva DPM Provider: Reynold Chandra MD    Anesthesia Type: general, MAC ASA Status: 3 - Emergent          Anesthesia Type: general, MAC    Vitals  Vitals Value Taken Time   BP 91/52 12/10/21 1841   Temp 36.4 °C (97.6 °F) 12/10/21 1831   Pulse 67 12/10/21 1843   Resp 12 12/10/21 1831   SpO2 100 % 12/10/21 1843   Vitals shown include unvalidated device data.        Post Anesthesia Care and Evaluation    Patient location during evaluation: bedside  Patient participation: complete - patient participated  Level of consciousness: awake, responsive to noxious stimuli, responsive to painful stimuli and responsive to verbal stimuli  Pain score: 2  Pain management: adequate  Airway patency: patent  Anesthetic complications: No anesthetic complications  PONV Status: none  Cardiovascular status: acceptable and stable  Respiratory status: acceptable and nasal cannula  Hydration status: acceptable    Comments: An Anesthesiologist personally participated in the most demanding procedures (including induction and emergence if applicable) in the anesthesia plan, monitored the course of anesthesia administration at frequent intervals and remained physically present and available for immediate diagnosis and treatment of emergencies.

## 2021-12-10 NOTE — OP NOTE
PREOPERATIVE DIAGNOSES:  Abscess right midfoot     PROCEDURES PERFORMED:  Incision and drainage done the level of the bone; CPT: 66101    ANESTHESIA:  Local with MAC     HEMOSTASIS:  None     ESTIMATED BLOOD LOSS:  20 mL     SPECIMENS:  Wound culture     DRAINS:  Half-inch iodoform     COMPLICATIONS:  None.     IMPLANTS:  None     SUTURES:  None     INJECTABLES:  20 mL of 0.25% Marcaine plain     DESCRIPTION OF PROCEDURE:  Written consent was obtained from the patient prior to any medications or sedation being administered.     Under mild sedation, the patient was brought to the operating room and placed on the operating table in the supine position.  A well padded calf tourniquet  was placed about the patient's calf on the surgical limb.    Following IV anesthesia, local anesthesia was obtained around the right ankle utilizing a total of 20 mL of 0.25% Marcaine plain.      The foot was then scrubbed, prepped and draped in the usual aseptic manner.       Attention was directed to the previous right midfoot amputation.    Incremental fluctuance along with edema and erythema were present along with early lymphangitis of the level of the ankle.  Medial aspect of plantar skin flap is extremely dusky.    With use of suture scissors, sutures were cut along the midfoot amputation site.    Copious amounts of bloody purulent necrotic tissue and discharge were expressed from the wound.  Wound culture was taken of this area.    Care was taken to identify and retract all vital and neurovascular structures.  All bleeders were ligated and cauterized as necessary.     The wound was flushed with copious amounts of sterile normal saline.     No residual areas of necrotic tissue or purulence was seen.    Vascular status intact to the tissue immediately proximal to the surgical site with capillary fill time is less than 3 seconds.    Wound was packed with quarter inch iodoform.  The surgical site was dressed with a sterile compressive  dressings consisting of 4 x 4's, 4 x 4's Kerlix, and Coban.     The patient tolerated the procedure and anesthesia well. The patient was transferred to the recovery room with vital signs stable and vascular status intact to right foot.    Following a period of postoperative monitoring, the patient will be transferred to his in-patient room and followed accordingly.

## 2021-12-11 LAB
GLUCOSE BLDC GLUCOMTR-MCNC: 118 MG/DL (ref 70–99)
GLUCOSE BLDC GLUCOMTR-MCNC: 162 MG/DL (ref 70–99)
GLUCOSE BLDC GLUCOMTR-MCNC: 180 MG/DL (ref 70–99)
GLUCOSE BLDC GLUCOMTR-MCNC: 211 MG/DL (ref 70–99)

## 2021-12-11 PROCEDURE — 25010000002 PIPERACILLIN SOD-TAZOBACTAM PER 1 G: Performed by: PODIATRIST

## 2021-12-11 PROCEDURE — 82962 GLUCOSE BLOOD TEST: CPT

## 2021-12-11 PROCEDURE — 94799 UNLISTED PULMONARY SVC/PX: CPT

## 2021-12-11 PROCEDURE — 99024 POSTOP FOLLOW-UP VISIT: CPT | Performed by: PODIATRIST

## 2021-12-11 PROCEDURE — 94761 N-INVAS EAR/PLS OXIMETRY MLT: CPT

## 2021-12-11 PROCEDURE — 63710000001 INSULIN DETEMIR PER 5 UNITS: Performed by: PODIATRIST

## 2021-12-11 PROCEDURE — 63710000001 INSULIN LISPRO (HUMAN) PER 5 UNITS: Performed by: PODIATRIST

## 2021-12-11 PROCEDURE — 25010000002 PIPERACILLIN SOD-TAZOBACTAM PER 1 G

## 2021-12-11 PROCEDURE — 25010000002 MORPHINE PER 10 MG: Performed by: PODIATRIST

## 2021-12-11 RX ADMIN — APIXABAN 5 MG: 5 TABLET, FILM COATED ORAL at 20:16

## 2021-12-11 RX ADMIN — PREGABALIN 25 MG: 25 CAPSULE ORAL at 20:16

## 2021-12-11 RX ADMIN — PREGABALIN 25 MG: 25 CAPSULE ORAL at 08:59

## 2021-12-11 RX ADMIN — LISINOPRIL 20 MG: 20 TABLET ORAL at 08:59

## 2021-12-11 RX ADMIN — FAMOTIDINE 40 MG: 20 TABLET ORAL at 08:59

## 2021-12-11 RX ADMIN — PIPERACILLIN SODIUM AND TAZOBACTAM SODIUM 4.5 G: 4; .5 INJECTION, POWDER, LYOPHILIZED, FOR SOLUTION INTRAVENOUS at 13:50

## 2021-12-11 RX ADMIN — INSULIN DETEMIR 25 UNITS: 100 INJECTION, SOLUTION SUBCUTANEOUS at 20:43

## 2021-12-11 RX ADMIN — MORPHINE SULFATE 2 MG: 2 INJECTION, SOLUTION INTRAMUSCULAR; INTRAVENOUS at 10:14

## 2021-12-11 RX ADMIN — PIPERACILLIN SODIUM AND TAZOBACTAM SODIUM 4.5 G: 4; .5 INJECTION, POWDER, LYOPHILIZED, FOR SOLUTION INTRAVENOUS at 06:28

## 2021-12-11 RX ADMIN — MORPHINE SULFATE 2 MG: 2 INJECTION, SOLUTION INTRAMUSCULAR; INTRAVENOUS at 20:16

## 2021-12-11 RX ADMIN — HYDROCODONE BITARTRATE AND ACETAMINOPHEN 1 TABLET: 5; 325 TABLET ORAL at 09:01

## 2021-12-11 RX ADMIN — INSULIN LISPRO 4 UNITS: 100 INJECTION, SOLUTION INTRAVENOUS; SUBCUTANEOUS at 12:35

## 2021-12-11 RX ADMIN — PIPERACILLIN SODIUM AND TAZOBACTAM SODIUM 4.5 G: 4; .5 INJECTION, POWDER, LYOPHILIZED, FOR SOLUTION INTRAVENOUS at 22:01

## 2021-12-11 RX ADMIN — TAMSULOSIN HYDROCHLORIDE 0.4 MG: 0.4 CAPSULE ORAL at 20:16

## 2021-12-11 RX ADMIN — ATORVASTATIN CALCIUM 10 MG: 10 TABLET, FILM COATED ORAL at 20:27

## 2021-12-11 RX ADMIN — METOPROLOL SUCCINATE 25 MG: 25 TABLET, EXTENDED RELEASE ORAL at 08:59

## 2021-12-11 RX ADMIN — BUPROPION HYDROCHLORIDE 150 MG: 150 TABLET, EXTENDED RELEASE ORAL at 20:27

## 2021-12-11 RX ADMIN — SODIUM CHLORIDE, PRESERVATIVE FREE 10 ML: 5 INJECTION INTRAVENOUS at 20:16

## 2021-12-11 RX ADMIN — INSULIN LISPRO 2 UNITS: 100 INJECTION, SOLUTION INTRAVENOUS; SUBCUTANEOUS at 20:43

## 2021-12-11 RX ADMIN — MORPHINE SULFATE 2 MG: 2 INJECTION, SOLUTION INTRAMUSCULAR; INTRAVENOUS at 14:09

## 2021-12-11 RX ADMIN — APIXABAN 5 MG: 5 TABLET, FILM COATED ORAL at 08:59

## 2021-12-11 RX ADMIN — CITALOPRAM HYDROBROMIDE 10 MG: 20 TABLET ORAL at 20:27

## 2021-12-11 RX ADMIN — ACETAMINOPHEN 650 MG: 325 TABLET ORAL at 22:01

## 2021-12-11 RX ADMIN — BUPROPION HYDROCHLORIDE 150 MG: 150 TABLET, EXTENDED RELEASE ORAL at 08:59

## 2021-12-11 RX ADMIN — INSULIN LISPRO 2 UNITS: 100 INJECTION, SOLUTION INTRAVENOUS; SUBCUTANEOUS at 17:00

## 2021-12-11 NOTE — PLAN OF CARE
Goal Outcome Evaluation:  Plan of Care Reviewed With: patient        Progress: improving  Outcome Summary: Patient came back from surgery VSS, was able to transfer from the strecther to the bed with little assistance, has had no c/o pain, ate and drank with no s/s of nausea.

## 2021-12-11 NOTE — PLAN OF CARE
Goal Outcome Evaluation:      Patient medicated for patient twice during shift for pain in right foot, pain goal met after intervention, VSS, dressing changes to R and L feet due on Monday 12/13/21.

## 2021-12-11 NOTE — PROGRESS NOTES
[facility]   Progress Note    Patient Name: Jose Shaikh  : 1960  MRN: [id number]  Primary Care Physician: Corazon Gr MD  Date of admission: 2021    Subjective   Subjective     Chief Complaint:   Follow-up on diabetic foot infection    History of Present Illness  Underwent reincision and drainage of right foot abscess.  Pain is tolerable since the procedure      Review of Systems   Constitutional: Negative for activity change, chills and fever.   Musculoskeletal: Positive for arthralgias.       Objective   Objective     Vitals:  Temp:  [97.6 °F (36.4 °C)-99.3 °F (37.4 °C)] 98.8 °F (37.1 °C)  Heart Rate:  [60-71] 65  Resp:  [12-32] 18  BP: ()/(45-74) 128/74  Flow (L/min):  [2-3] 3    Physical Exam  Constitutional:       Appearance: Normal appearance.   Cardiovascular:      Rate and Rhythm: Normal rate and regular rhythm.   Musculoskeletal:      Comments: Right foot: Dressing in place   Skin:     General: Skin is warm.   Neurological:      Mental Status: He is alert.         Result Review    Result Review:  I have personally reviewed the results from the time of this admission to 21 2:38 PM EST and agree with these findings:  []  Laboratory  []  Microbiology  []  Radiology  []  EKG/Telemetry   []  Cardiology/Vascular   []  Pathology  []  Old records  []  Other:    Assessment/Plan   Assessment / Plan       Active Hospital Problems:  Active Hospital Problems    Diagnosis    • Cellulitis and abscess of foot    • Lactic acidosis    • Essential hypertension    • Diabetic ulcer of right midfoot associated with type 2 DM    • Diabetic ulcer of left heel associated with type 2 DM        Plan:   Continue current management  Gradually increase activity  May need subacute rehabilitation and skilled nursing care post discharge                  Electronically signed by Darryn Osullivan MD, 21, 2:38 PM EST.

## 2021-12-11 NOTE — PROGRESS NOTES
[facility] - PODIATRY    Today's Date: 12/11/21    Patient Name: Jose Shaikh  MRN: [id number]  CSN: [id number]  PCP: Corazon Gr MD  Referring Provider: Yung Grijalva MD  Attending Provider: Yung Grijalva MD  Length of Stay: 10    SUBJECTIVE   Chief Complaint: Right foot osteomyelitis    HPI: Jose Shaikh, a 61 y.o.male,     Procedure: I&D of right midfoot  Date: 10 December 2021    Procedure: Right midfoot amputation  Date: 2 December 2021    Patient states they are doing well without complications.  Patient states they are following post-op instructions.  Patient states pain is controlled.      Patient denies any fevers, chills, nausea, vomiting, shortness of breathe, nor any other constitutional signs nor symptoms.      Past Medical History:   Diagnosis Date   • Absence of toe of right foot    • Acute osteomyelitis of left calcaneus  8/18/2021   • Anxiety and depression    • Arthritis    • Claustrophobia    • Corns and callus    • Diabetic ulcer of left heel associated with type 2 DM 8/18/2021   • Diabetic ulcer of left heel associated with type 2 DM 7/6/2021   • Diabetic ulcer of right midfoot associated with type 2 DM 8/18/2021   • Difficulty walking    • Essential hypertension 8/31/2021   • Hammertoe    • Hyperlipidemia LDL goal <100 8/31/2021   • Ingrown toenail    • Obesity    • Paroxysmal atrial fibrillation 8/31/2021   • Polyneuropathy    • Pressure ulcer, stage 1    • Tinea unguium    • Type 2 diabetes mellitus with polyneuropathy      Past Surgical History:   Procedure Laterality Date   • CYST REMOVAL      center of back; benign   • INCISION AND DRAINAGE ABSCESS      back   • OTHER SURGICAL HISTORY      Surgical clips left foot   • TOE SURGERY Right     Removal of 5th toe   • TRANS METATARSAL AMPUTATION Right 12/2/2021    Procedure: AMPUTATION TRANS METATARSAL;  Surgeon: Ash Leyva DPM;  Location: [facility] MAIN OR;  Service: Podiatry;  Laterality: Right;   • WRIST  SURGERY Left     repair of injury     Family History   Problem Relation Age of Onset   • Heart disease Mother    • Heart disease Father    • Cancer Father         Unspecified   • Heart disease Brother      Social History     Socioeconomic History   • Marital status: Single   Tobacco Use   • Smoking status: Former Smoker     Packs/day: 0.00     Years: 1.00     Pack years: 0.00     Quit date: 1991     Years since quittin.3   • Smokeless tobacco: Never Used   • Tobacco comment: quit at age 30   Vaping Use   • Vaping Use: Never used   Substance and Sexual Activity   • Alcohol use: Yes     Comment: Rare   • Drug use: Yes     Types: Marijuana     Comment: Daily   • Sexual activity: Defer     Allergies   Allergen Reactions   • Adhesive Tape Rash     Current Facility-Administered Medications   Medication Dose Route Frequency Provider Last Rate Last Admin   • acetaminophen (TYLENOL) suppository 650 mg  650 mg Rectal Q4H PRN Ash Leyva DPM       • acetaminophen (TYLENOL) tablet 650 mg  650 mg Oral Q4H PRN Ash Leyva DPM       • aluminum-magnesium hydroxide-simethicone (MAALOX MAX) 400-400-40 MG/5ML suspension 15 mL  15 mL Oral Q6H PRN Ash Leyva DPM       • apixaban (ELIQUIS) tablet 5 mg  5 mg Oral Q12H Ash Leyva DPM   5 mg at 21 0859   • atorvastatin (LIPITOR) tablet 10 mg  10 mg Oral Nightly Ash Leyva DPM   10 mg at 12/10/21 2206   • sennosides-docusate (PERICOLACE) 8.6-50 MG per tablet 1 tablet  1 tablet Oral BID Ash Leyva DPM   1 tablet at 12/10/21 2205    And   • polyethylene glycol (MIRALAX) packet 17 g  17 g Oral Daily PRN Ash Leyva DPM        And   • bisacodyl (DULCOLAX) EC tablet 5 mg  5 mg Oral Daily PRN Ash Leyva DPM        And   • bisacodyl (DULCOLAX) suppository 10 mg  10 mg Rectal Daily PRN Ash Leyva DPM       • buPROPion XL (WELLBUTRIN XL) 24 hr tablet 150 mg  150 mg Oral  BID Ash Leyva DPM   150 mg at 12/11/21 0859   • citalopram (CeleXA) tablet 10 mg  10 mg Oral Daily Ash Leyva DPM   10 mg at 12/10/21 2205   • dextrose (GLUTOSE) oral gel 15 g  15 g Oral Q15 Min PRN Ash Leyva DPM       • dextrose 10 % infusion  25 g Intravenous Q15 Min PRN Ash Leyva DPM       • famotidine (PEPCID) tablet 40 mg  40 mg Oral Daily Ash Leyva DPM   40 mg at 12/11/21 0859   • glucagon (human recombinant) (GLUCAGEN DIAGNOSTIC) injection 1 mg  1 mg Subcutaneous Q15 Min PRN Ash Leyva DPM       • HYDROcodone-acetaminophen (NORCO)  MG per tablet 1 tablet  1 tablet Oral Q4H PRN Ash Leyva DPM       • HYDROcodone-acetaminophen (NORCO) 5-325 MG per tablet 1 tablet  1 tablet Oral Q4H PRN Ash Leyva DPM   1 tablet at 12/11/21 0901   • insulin detemir (LEVEMIR) injection 25 Units  25 Units Subcutaneous Nightly Ash Leyva DPM   25 Units at 12/10/21 2207   • insulin lispro (humaLOG) injection 0-9 Units  0-9 Units Subcutaneous 4x Daily With Meals & Nightly Ash Leyva DPM   2 Units at 12/10/21 2207   • lactated ringers infusion  9 mL/hr Intravenous Continuous PRN Ash Leyva DPM       • lisinopril (PRINIVIL,ZESTRIL) tablet 20 mg  20 mg Oral Daily Ash Leyva DPM   20 mg at 12/11/21 0859   • loperamide (IMODIUM) capsule 2 mg  2 mg Oral Q4H PRN Ash Leyva DPM       • metoprolol succinate XL (TOPROL-XL) 24 hr tablet 25 mg  25 mg Oral Q24H Ash Leyva DPM   25 mg at 12/11/21 0859   • morphine injection 2 mg  2 mg Intravenous Q2H PRN Ash Leyva DPM   2 mg at 12/11/21 1014   • naloxone (NARCAN) injection 0.4 mg  0.4 mg Intravenous Q5 Min PRN Ash Leyva DPM       • ondansetron (ZOFRAN) tablet 4 mg  4 mg Oral Q6H PRN Ash Leyva DPM        Or   • ondansetron (ZOFRAN) injection 4 mg  4 mg Intravenous Q6H  PRN Ash Leyva DPM       • Pharmacy to Dose Zosyn   Does not apply Continuous PRN Ash Leyva DPM       • piperacillin-tazobactam (ZOSYN) 4.5 g/100 mL 0.9% NS IVPB (mbp)  4.5 g Intravenous Q8H Ash Leyva DPM 0 mL/hr at 12/11/21 0205 4.5 g at 12/11/21 0628   • pregabalin (LYRICA) capsule 25 mg  25 mg Oral Q12H Ash Leyva DPM   25 mg at 12/11/21 0859   • sodium chloride 0.9 % flush 10 mL  10 mL Intravenous Q12H Ash Leyva DPM   10 mL at 12/10/21 2207   • sodium chloride 0.9 % flush 10 mL  10 mL Intravenous PRN Ash Leyva DPM       • tamsulosin (FLOMAX) 24 hr capsule 0.4 mg  0.4 mg Oral Nightly Ash Leyva DPM   0.4 mg at 12/10/21 2205     Review of Systems   Constitutional: Negative.    Skin:        Bilateral foot ulcers   All other systems reviewed and are negative.      OBJECTIVE     Vitals:    12/11/21 1129   BP: 128/74   Pulse: 65   Resp: 18   Temp: 98.8 °F (37.1 °C)   SpO2: 98%       PHYSICAL EXAM    GEN:   A&Ox3, NAD. Pt presents in hospital bed.     Neurovascular status unchanged    Ortho: Right midfoot amputation    Right foot: Dressing is dry and intact no signs of breakthrough.  Upon removal of packing, bone is exposed to the midfoot.  Bloody serous drainage present.  Significant provement edema and erythema in right foot.  No lymphangitis.  No fluctuance.  No necrotic tissue present.    Left plantar heel shows stage III ulceration with granular tissue present.  No signs of edema, erythema, lymphangitis, nor signs of infection.      LABORATORY/CULTURE RESULTS:  Results from last 7 days   Lab Units 12/10/21  0626 12/05/21  0612   WBC 10*3/mm3 8.17 7.58   HEMOGLOBIN g/dL 11.4* 12.1*   HEMATOCRIT % 34.4* 37.5   PLATELETS 10*3/mm3 129* 107*     Results from last 7 days   Lab Units 12/10/21  0626 12/05/21  0612   SODIUM mmol/L 134* 136   POTASSIUM mmol/L 4.3 4.5   CHLORIDE mmol/L 102 102   CO2 mmol/L 24.1 27.5   BUN mg/dL 9  10   CREATININE mg/dL 0.81 0.74*   CALCIUM mg/dL 8.3* 8.7   BILIRUBIN mg/dL 0.8 0.9   ALK PHOS U/L 327* 195*   ALT (SGPT) U/L 50* 31   AST (SGOT) U/L 62* 35   GLUCOSE mg/dL 149* 126*         Microbiology Results (last 10 days)     Procedure Component Value - Date/Time    Wound Culture - Wound, Foot, Right [[id number]] Collected: 12/10/21 1835    Lab Status: Preliminary result Specimen: Wound from Foot, Right Updated: 12/11/21 0916     Wound Culture Growth present, too young to evaluate     Gram Stain Moderate (3+) WBCs seen      Rare (1+) Gram negative bacilli, tiny           ASSESSMENT/PLAN     Patient Active Problem List   Diagnosis   • Diabetic ulcer of left heel associated with type 2 DM   • Acute osteomyelitis of left calcaneus    • Diabetic ulcer of left heel associated with type 2 DM   • Diabetic ulcer of right midfoot associated with type 2 DM   • Paroxysmal atrial fibrillation   • Essential hypertension   • Hyperlipidemia LDL goal <100   • Cellulitis and abscess of foot   • Lactic acidosis       Comprehensive lower extremity examination and evaluation was performed.    Orders:  -Dressing change orders for right foot  -Wound care nurse consult for VAC dressing on right foot  -Up to bedside commode and chair as needed    Recommend:  -Rehabilitation placement after discharge from acute stay    Plan was discussed with the patient's nurse.    Discussed with patient's nurse.      Lab Frequency Next Occurrence   Lipid Panel Once 02/27/2022   WOUND CARE HYPERBARIC weekdays        This document has been electronically signed by Ash Leyva DPM on December 11, 2021 11:41 EST

## 2021-12-12 LAB
BACTERIA SPEC AEROBE CULT: ABNORMAL
GLUCOSE BLDC GLUCOMTR-MCNC: 171 MG/DL (ref 70–99)
GLUCOSE BLDC GLUCOMTR-MCNC: 182 MG/DL (ref 70–99)
GLUCOSE BLDC GLUCOMTR-MCNC: 198 MG/DL (ref 70–99)
GLUCOSE BLDC GLUCOMTR-MCNC: 235 MG/DL (ref 70–99)
GRAM STN SPEC: ABNORMAL
GRAM STN SPEC: ABNORMAL

## 2021-12-12 PROCEDURE — 97110 THERAPEUTIC EXERCISES: CPT

## 2021-12-12 PROCEDURE — 94761 N-INVAS EAR/PLS OXIMETRY MLT: CPT

## 2021-12-12 PROCEDURE — 25010000002 PIPERACILLIN SOD-TAZOBACTAM PER 1 G: Performed by: PODIATRIST

## 2021-12-12 PROCEDURE — 25010000002 MORPHINE PER 10 MG: Performed by: PODIATRIST

## 2021-12-12 PROCEDURE — 94760 N-INVAS EAR/PLS OXIMETRY 1: CPT

## 2021-12-12 PROCEDURE — 82962 GLUCOSE BLOOD TEST: CPT

## 2021-12-12 PROCEDURE — 63710000001 INSULIN LISPRO (HUMAN) PER 5 UNITS: Performed by: PODIATRIST

## 2021-12-12 PROCEDURE — 94799 UNLISTED PULMONARY SVC/PX: CPT

## 2021-12-12 PROCEDURE — 63710000001 INSULIN DETEMIR PER 5 UNITS: Performed by: PODIATRIST

## 2021-12-12 RX ADMIN — SENNOSIDES AND DOCUSATE SODIUM 1 TABLET: 50; 8.6 TABLET ORAL at 20:31

## 2021-12-12 RX ADMIN — INSULIN LISPRO 2 UNITS: 100 INJECTION, SOLUTION INTRAVENOUS; SUBCUTANEOUS at 12:58

## 2021-12-12 RX ADMIN — PREGABALIN 25 MG: 25 CAPSULE ORAL at 08:47

## 2021-12-12 RX ADMIN — INSULIN DETEMIR 25 UNITS: 100 INJECTION, SOLUTION SUBCUTANEOUS at 20:27

## 2021-12-12 RX ADMIN — INSULIN LISPRO 2 UNITS: 100 INJECTION, SOLUTION INTRAVENOUS; SUBCUTANEOUS at 08:48

## 2021-12-12 RX ADMIN — BUPROPION HYDROCHLORIDE 150 MG: 150 TABLET, EXTENDED RELEASE ORAL at 08:47

## 2021-12-12 RX ADMIN — SODIUM CHLORIDE, PRESERVATIVE FREE 10 ML: 5 INJECTION INTRAVENOUS at 22:03

## 2021-12-12 RX ADMIN — MORPHINE SULFATE 2 MG: 2 INJECTION, SOLUTION INTRAMUSCULAR; INTRAVENOUS at 18:12

## 2021-12-12 RX ADMIN — PREGABALIN 25 MG: 25 CAPSULE ORAL at 20:31

## 2021-12-12 RX ADMIN — SENNOSIDES AND DOCUSATE SODIUM 1 TABLET: 50; 8.6 TABLET ORAL at 08:53

## 2021-12-12 RX ADMIN — ATORVASTATIN CALCIUM 10 MG: 10 TABLET, FILM COATED ORAL at 20:31

## 2021-12-12 RX ADMIN — CITALOPRAM HYDROBROMIDE 10 MG: 20 TABLET ORAL at 20:30

## 2021-12-12 RX ADMIN — PIPERACILLIN SODIUM AND TAZOBACTAM SODIUM 4.5 G: 4; .5 INJECTION, POWDER, LYOPHILIZED, FOR SOLUTION INTRAVENOUS at 14:42

## 2021-12-12 RX ADMIN — FAMOTIDINE 40 MG: 20 TABLET ORAL at 08:47

## 2021-12-12 RX ADMIN — APIXABAN 5 MG: 5 TABLET, FILM COATED ORAL at 08:47

## 2021-12-12 RX ADMIN — MORPHINE SULFATE 2 MG: 2 INJECTION, SOLUTION INTRAMUSCULAR; INTRAVENOUS at 02:28

## 2021-12-12 RX ADMIN — PIPERACILLIN SODIUM AND TAZOBACTAM SODIUM 4.5 G: 4; .5 INJECTION, POWDER, LYOPHILIZED, FOR SOLUTION INTRAVENOUS at 05:27

## 2021-12-12 RX ADMIN — MORPHINE SULFATE 2 MG: 2 INJECTION, SOLUTION INTRAMUSCULAR; INTRAVENOUS at 08:53

## 2021-12-12 RX ADMIN — HYDROCODONE BITARTRATE AND ACETAMINOPHEN 1 TABLET: 10; 325 TABLET ORAL at 18:06

## 2021-12-12 RX ADMIN — MORPHINE SULFATE 2 MG: 2 INJECTION, SOLUTION INTRAMUSCULAR; INTRAVENOUS at 15:14

## 2021-12-12 RX ADMIN — HYDROCODONE BITARTRATE AND ACETAMINOPHEN 1 TABLET: 5; 325 TABLET ORAL at 22:19

## 2021-12-12 RX ADMIN — PIPERACILLIN SODIUM AND TAZOBACTAM SODIUM 4.5 G: 4; .5 INJECTION, POWDER, LYOPHILIZED, FOR SOLUTION INTRAVENOUS at 21:59

## 2021-12-12 RX ADMIN — METOPROLOL SUCCINATE 25 MG: 25 TABLET, EXTENDED RELEASE ORAL at 08:47

## 2021-12-12 RX ADMIN — SODIUM CHLORIDE, PRESERVATIVE FREE 10 ML: 5 INJECTION INTRAVENOUS at 08:48

## 2021-12-12 RX ADMIN — MORPHINE SULFATE 2 MG: 2 INJECTION, SOLUTION INTRAMUSCULAR; INTRAVENOUS at 22:19

## 2021-12-12 RX ADMIN — LISINOPRIL 20 MG: 20 TABLET ORAL at 08:47

## 2021-12-12 RX ADMIN — BUPROPION HYDROCHLORIDE 150 MG: 150 TABLET, EXTENDED RELEASE ORAL at 21:25

## 2021-12-12 RX ADMIN — INSULIN LISPRO 4 UNITS: 100 INJECTION, SOLUTION INTRAVENOUS; SUBCUTANEOUS at 20:27

## 2021-12-12 RX ADMIN — INSULIN LISPRO 2 UNITS: 100 INJECTION, SOLUTION INTRAVENOUS; SUBCUTANEOUS at 18:02

## 2021-12-12 RX ADMIN — TAMSULOSIN HYDROCHLORIDE 0.4 MG: 0.4 CAPSULE ORAL at 20:31

## 2021-12-12 RX ADMIN — APIXABAN 5 MG: 5 TABLET, FILM COATED ORAL at 20:31

## 2021-12-12 RX ADMIN — MORPHINE SULFATE 2 MG: 2 INJECTION, SOLUTION INTRAMUSCULAR; INTRAVENOUS at 13:03

## 2021-12-12 NOTE — PLAN OF CARE
Problem: Adult Inpatient Plan of Care  Goal: Plan of Care Review  Outcome: Ongoing, Progressing  Flowsheets  Taken 12/12/2021 0446 by Charla Roque, RN  Progress: no change  Outcome Summary: VSS. C/O pain twice, medicated with prn pain medication. No other complaints. All needs met at this time.  Taken 12/10/2021 1851 by Leidy Orosco, RN  Plan of Care Reviewed With: patient  Goal: Patient-Specific Goal (Individualized)  Outcome: Ongoing, Progressing  Goal: Absence of Hospital-Acquired Illness or Injury  Outcome: Ongoing, Progressing  Goal: Optimal Comfort and Wellbeing  Outcome: Ongoing, Progressing  Goal: Readiness for Transition of Care  Outcome: Ongoing, Progressing     Problem: Skin or Soft Tissue Infection  Goal: Infection Symptom Resolution  Outcome: Ongoing, Progressing     Problem: Fall Injury Risk  Goal: Absence of Fall and Fall-Related Injury  Outcome: Ongoing, Progressing     Problem: Skin Injury Risk Increased  Goal: Skin Health and Integrity  Outcome: Ongoing, Progressing   Goal Outcome Evaluation:

## 2021-12-12 NOTE — PLAN OF CARE
Goal Outcome Evaluation:           Progress: no change  Outcome Summary: VSS. C/O pain twice, medicated with prn pain medication. No other complaints. All needs met at this time.

## 2021-12-12 NOTE — PROGRESS NOTES
[facility]   Progress Note    Patient Name: Jose Shaikh  : 1960  MRN: [id number]  Primary Care Physician: Corazon Gr MD  Date of admission: 2021    Subjective   Subjective     Chief Complaint:   Follow-up on diabetic foot infection    History of Present Illness   No new complaints  States that he is going to have a wound VAC placed on the right foot tomorrow        Review of Systems    Objective   Objective     Vitals:  Temp:  [98.5 °F (36.9 °C)-100.4 °F (38 °C)] 98.5 °F (36.9 °C)  Heart Rate:  [64-75] 64  Resp:  [16-20] 18  BP: (103-119)/(61-71) 116/71    Physical Exam  Constitutional:       Appearance: Normal appearance.   Cardiovascular:      Rate and Rhythm: Normal rate and regular rhythm.   Skin:     General: Skin is warm.   Neurological:      General: No focal deficit present.      Mental Status: He is alert.            Result Review    Result Review:  I have personally reviewed the results from the time of this admission to 21 11:58 AM EST and agree with these findings:  []  Laboratory  []  Microbiology  []  Radiology  []  EKG/Telemetry   []  Cardiology/Vascular   []  Pathology  []  Old records  []  Other:    Assessment/Plan   Assessment / Plan       Active Hospital Problems:  Active Hospital Problems    Diagnosis    • Cellulitis and abscess of foot    • Lactic acidosis    • Essential hypertension    • Diabetic ulcer of right midfoot associated with type 2 DM    • Diabetic ulcer of left heel associated with type 2 DM        Plan:   Continue current management  Increase activity                    Electronically signed by Darryn Osullivan MD, 21, 11:58 AM EST.

## 2021-12-12 NOTE — THERAPY TREATMENT NOTE
Acute Care - Physical Therapy Progress Note   [facility]     Patient Name: Jose Shaikh  : 1960  MRN: [id number]  Today's Date: 2021      Visit Dx:     ICD-10-CM ICD-9-CM   1. Diabetic ulcer of right midfoot associated with type 2 DM  E11.621 250.80    L97.415 707.14   2. Diabetic ulcer of right midfoot associated with type 2 diabetes mellitus, with muscle involvement without evidence of necrosis (HCC)  E11.621 250.80    L97.415 707.14   3. Difficulty walking  R26.2 719.7   4. Cellulitis and abscess of foot  L03.119 682.7    L02.619      Patient Active Problem List   Diagnosis   • Diabetic ulcer of left heel associated with type 2 DM   • Acute osteomyelitis of left calcaneus    • Diabetic ulcer of left heel associated with type 2 DM   • Diabetic ulcer of right midfoot associated with type 2 DM   • Paroxysmal atrial fibrillation   • Essential hypertension   • Hyperlipidemia LDL goal <100   • Cellulitis and abscess of foot   • Lactic acidosis     Past Medical History:   Diagnosis Date   • Absence of toe of right foot    • Acute osteomyelitis of left calcaneus  2021   • Anxiety and depression    • Arthritis    • Claustrophobia    • Corns and callus    • Diabetic ulcer of left heel associated with type 2 DM 2021   • Diabetic ulcer of left heel associated with type 2 DM 2021   • Diabetic ulcer of right midfoot associated with type 2 DM 2021   • Difficulty walking    • Essential hypertension 2021   • Hammertoe    • Hyperlipidemia LDL goal <100 2021   • Ingrown toenail    • Obesity    • Paroxysmal atrial fibrillation 2021   • Polyneuropathy    • Pressure ulcer, stage 1    • Tinea unguium    • Type 2 diabetes mellitus with polyneuropathy      Past Surgical History:   Procedure Laterality Date   • CYST REMOVAL      center of back; benign   • INCISION AND DRAINAGE ABSCESS      back   • INCISION AND DRAINAGE LEG Right 12/10/2021    Procedure: INCISION AND DRAINAGE LOWER  EXTREMITY;  Surgeon: Ash Leyva DPM;  Location: [facility] MAIN OR;  Service: Podiatry;  Laterality: Right;   • OTHER SURGICAL HISTORY      Surgical clips left foot   • TOE SURGERY Right     Removal of 5th toe   • TRANS METATARSAL AMPUTATION Right 12/2/2021    Procedure: AMPUTATION TRANS METATARSAL;  Surgeon: Ash Leyva DPM;  Location: [facility] MAIN OR;  Service: Podiatry;  Laterality: Right;   • WRIST SURGERY Left     repair of injury     PT Assessment (last 12 hours)     PT Evaluation and Treatment     Row Name 12/12/21 1200          Physical Therapy Time and Intention    Subjective Information complains of; pain  -CS     Document Type therapy note (daily note)  -CS     Mode of Treatment individual therapy; physical therapy  -CS     Patient Effort adequate  -CS     Symptoms Noted During/After Treatment other (see comments)  maintained pain in R LE  -CS     Row Name 12/12/21 1200          Pain    Additional Documentation Pain Scale: Numbers Pre/Post-Treatment (Group)  -CS     Row Name 12/12/21 1200          Pain Scale: Numbers Pre/Post-Treatment    Pretreatment Pain Rating --  14/10  -CS     Posttreatment Pain Rating --  14/10  -CS     Pain Location - Side Right  -CS     Pain Location - Orientation distal  -CS     Pain Location foot  -CS     Row Name 12/12/21 1200          Hip (Therapeutic Exercise)    Hip AROM (Therapeutic Exercise) bilateral; flexion; extension; aBduction; aDduction; supine; other (see comments)  x 20 reps  -CS     Row Name 12/12/21 1200          Knee (Therapeutic Exercise)    Knee AROM (Therapeutic Exercise) bilateral; SAQ (short arc quad); supine; 10 repetitions; 2 sets  -CS     Row Name 12/12/21 1200          Ankle (Therapeutic Exercise)    Ankle AROM (Therapeutic Exercise) bilateral; dorsiflexion; plantarflexion; supine; 10 repetitions; 2 sets  -CS     Row Name             Wound Right heel    Wound - Properties Group Side: Right  -DM Location: heel  -DM Stage, Pressure  Injury : other (see comments)  -DM, covered by dressing at this time      Retired Wound - Properties Group Side: Right  -DM Location: heel  -DM     Row Name             Wound 06/22/21 1133 Left lateral heel    Wound - Properties Group Placement Date: 06/22/21  -FABIOLA Placement Time: 1133  -FABIOLA Present on Hospital Admission: Y  -FABIOLA Side: Left  -FABIOLA Orientation: lateral  -FABIOLA Location: heel  -FABIOLA     Retired Wound - Properties Group Date first assessed: 06/22/21  -FABIOLA Time first assessed: 1133  -FABIOLA Present on Hospital Admission: Y  -FABIOLA Side: Left  -FABIOLA Location: heel  -FABIOLA     Row Name             Wound 12/02/21 Right anterior foot Incision    Wound - Properties Group Placement Date: 12/02/21  -ES Side: Right  -ES Orientation: anterior  -ES Location: foot  -ES Primary Wound Type: Incision  -ES     Retired Wound - Properties Group Date first assessed: 12/02/21  -ES Side: Right  -ES Location: foot  -ES Primary Wound Type: Incision  -ES     Row Name 12/12/21 1200          Progress Summary (PT)    Daily Progress Summary (PT) Pt. presents this a.m in semi stewart position upon entering room and pt. reporting significant pain in R LE but agreeable to supine TE.  Supine TE performed as outlined in this progress note.  Pt. remained in supine position after Tx and had call button within reach.  -CS           User Key  (r) = Recorded By, (t) = Taken By, (c) = Cosigned By    Initials Name Provider Type    DM Nelly Cantu, RN Registered Nurse    Marlen Vaughn, RN Registered Nurse    Ronald Huerta PTA Physical Therapy Assistant    Katlyn Garcia RN Registered Nurse                Physical Therapy Education                 Title: PT OT SLP Therapies (Done)     Topic: Physical Therapy (Done)     Point: Mobility training (Done)     Learning Progress Summary           Patient Acceptance, E,TB, VU by DP at 12/5/2021 1019                   Point: Home exercise program (Done)     Learning Progress Summary           Patient  Acceptance, E,TB, VU by DP at 12/5/2021 1019                   Point: Body mechanics (Done)     Learning Progress Summary           Patient Acceptance, E,TB, VU by DP at 12/5/2021 1019                   Point: Precautions (Done)     Learning Progress Summary           Patient Acceptance, E,TB, VU by DP at 12/5/2021 1019                               User Key     Initials Effective Dates Name Provider Type Discipline    DP 06/03/21 -  Deedee Landers, PT Physical Therapist PT              PT Recommendation and Plan     Progress Summary (PT)  Daily Progress Summary (PT): Pt. presents this a.m in semi stewart position upon entering room and pt. reporting significant pain in R LE but agreeable to supine TE.  Supine TE performed as outlined in this progress note.  Pt. remained in supine position after Tx and had call button within reach.   Outcome Measures     Row Name 12/12/21 1200             How much help from another person do you currently need...    Turning from your back to your side while in flat bed without using bedrails? 3  -CS      Moving from lying on back to sitting on the side of a flat bed without bedrails? 3  -CS      Moving to and from a bed to a chair (including a wheelchair)? 2  -CS      Standing up from a chair using your arms (e.g., wheelchair, bedside chair)? 2  -CS      Climbing 3-5 steps with a railing? 1  -CS      To walk in hospital room? 1  -CS      AM-PAC 6 Clicks Score (PT) 12  -CS              Functional Assessment    Outcome Measure Options AM-PAC 6 Clicks Basic Mobility (PT)  -CS            User Key  (r) = Recorded By, (t) = Taken By, (c) = Cosigned By    Initials Name Provider Type    Ronald Huerta, PTA Physical Therapy Assistant                 Time Calculation:    PT Charges     Row Name 12/12/21 1221             Time Calculation    Start Time 1022  -CS      PT Received On 12/12/21  -CS      PT Goal Re-Cert Due Date 12/14/21  -CS              Timed Charges    97983 - PT  Therapeutic Exercise Minutes 12  -CS              Total Minutes    Timed Charges Total Minutes 12  -CS       Total Minutes 12  -CS            User Key  (r) = Recorded By, (t) = Taken By, (c) = Cosigned By    Initials Name Provider Type    CS Ronald Fabian PTA Physical Therapy Assistant              Therapy Charges for Today     Code Description Service Date Service Provider Modifiers Qty    04185216314 HC PT THER PROC EA 15 MIN 12/12/2021 Ronald Fabian PTA GP 1          PT G-Codes  Outcome Measure Options: AM-PAC 6 Clicks Basic Mobility (PT)  AM-PAC 6 Clicks Score (PT): 12    Ronald Fabian PTA  12/12/2021

## 2021-12-13 LAB
GLUCOSE BLDC GLUCOMTR-MCNC: 117 MG/DL (ref 70–99)
GLUCOSE BLDC GLUCOMTR-MCNC: 154 MG/DL (ref 70–99)
GLUCOSE BLDC GLUCOMTR-MCNC: 211 MG/DL (ref 70–99)
GLUCOSE BLDC GLUCOMTR-MCNC: 247 MG/DL (ref 70–99)

## 2021-12-13 PROCEDURE — 63710000001 INSULIN LISPRO (HUMAN) PER 5 UNITS: Performed by: PODIATRIST

## 2021-12-13 PROCEDURE — 25010000002 PIPERACILLIN SOD-TAZOBACTAM PER 1 G

## 2021-12-13 PROCEDURE — 97606 NEG PRS WND THER DME>50 SQCM: CPT

## 2021-12-13 PROCEDURE — 25010000002 MORPHINE PER 10 MG: Performed by: PODIATRIST

## 2021-12-13 PROCEDURE — 63710000001 INSULIN DETEMIR PER 5 UNITS: Performed by: PODIATRIST

## 2021-12-13 PROCEDURE — 94799 UNLISTED PULMONARY SVC/PX: CPT

## 2021-12-13 PROCEDURE — 82962 GLUCOSE BLOOD TEST: CPT

## 2021-12-13 PROCEDURE — 25010000002 PIPERACILLIN SOD-TAZOBACTAM PER 1 G: Performed by: PODIATRIST

## 2021-12-13 RX ADMIN — BUPROPION HYDROCHLORIDE 150 MG: 150 TABLET, EXTENDED RELEASE ORAL at 20:28

## 2021-12-13 RX ADMIN — SODIUM CHLORIDE, PRESERVATIVE FREE 10 ML: 5 INJECTION INTRAVENOUS at 10:56

## 2021-12-13 RX ADMIN — MORPHINE SULFATE 2 MG: 2 INJECTION, SOLUTION INTRAMUSCULAR; INTRAVENOUS at 20:39

## 2021-12-13 RX ADMIN — METOPROLOL SUCCINATE 25 MG: 25 TABLET, EXTENDED RELEASE ORAL at 10:54

## 2021-12-13 RX ADMIN — HYDROCODONE BITARTRATE AND ACETAMINOPHEN 1 TABLET: 5; 325 TABLET ORAL at 05:33

## 2021-12-13 RX ADMIN — INSULIN LISPRO 4 UNITS: 100 INJECTION, SOLUTION INTRAVENOUS; SUBCUTANEOUS at 18:26

## 2021-12-13 RX ADMIN — INSULIN LISPRO 2 UNITS: 100 INJECTION, SOLUTION INTRAVENOUS; SUBCUTANEOUS at 14:01

## 2021-12-13 RX ADMIN — HYDROCODONE BITARTRATE AND ACETAMINOPHEN 1 TABLET: 10; 325 TABLET ORAL at 11:41

## 2021-12-13 RX ADMIN — SODIUM CHLORIDE, PRESERVATIVE FREE 10 ML: 5 INJECTION INTRAVENOUS at 20:29

## 2021-12-13 RX ADMIN — FAMOTIDINE 40 MG: 20 TABLET ORAL at 10:54

## 2021-12-13 RX ADMIN — PIPERACILLIN SODIUM AND TAZOBACTAM SODIUM 4.5 G: 4; .5 INJECTION, POWDER, LYOPHILIZED, FOR SOLUTION INTRAVENOUS at 14:01

## 2021-12-13 RX ADMIN — PREGABALIN 25 MG: 25 CAPSULE ORAL at 20:39

## 2021-12-13 RX ADMIN — APIXABAN 5 MG: 5 TABLET, FILM COATED ORAL at 20:28

## 2021-12-13 RX ADMIN — MORPHINE SULFATE 2 MG: 2 INJECTION, SOLUTION INTRAMUSCULAR; INTRAVENOUS at 05:33

## 2021-12-13 RX ADMIN — MORPHINE SULFATE 2 MG: 2 INJECTION, SOLUTION INTRAMUSCULAR; INTRAVENOUS at 16:26

## 2021-12-13 RX ADMIN — PIPERACILLIN SODIUM AND TAZOBACTAM SODIUM 4.5 G: 4; .5 INJECTION, POWDER, LYOPHILIZED, FOR SOLUTION INTRAVENOUS at 22:25

## 2021-12-13 RX ADMIN — HYDROCODONE BITARTRATE AND ACETAMINOPHEN 1 TABLET: 10; 325 TABLET ORAL at 16:26

## 2021-12-13 RX ADMIN — PREGABALIN 25 MG: 25 CAPSULE ORAL at 11:26

## 2021-12-13 RX ADMIN — LISINOPRIL 20 MG: 20 TABLET ORAL at 10:55

## 2021-12-13 RX ADMIN — APIXABAN 5 MG: 5 TABLET, FILM COATED ORAL at 10:54

## 2021-12-13 RX ADMIN — INSULIN DETEMIR 25 UNITS: 100 INJECTION, SOLUTION SUBCUTANEOUS at 20:28

## 2021-12-13 RX ADMIN — PIPERACILLIN SODIUM AND TAZOBACTAM SODIUM 4.5 G: 4; .5 INJECTION, POWDER, LYOPHILIZED, FOR SOLUTION INTRAVENOUS at 05:33

## 2021-12-13 RX ADMIN — MORPHINE SULFATE 2 MG: 2 INJECTION, SOLUTION INTRAMUSCULAR; INTRAVENOUS at 11:42

## 2021-12-13 RX ADMIN — ATORVASTATIN CALCIUM 10 MG: 10 TABLET, FILM COATED ORAL at 20:28

## 2021-12-13 RX ADMIN — INSULIN LISPRO 4 UNITS: 100 INJECTION, SOLUTION INTRAVENOUS; SUBCUTANEOUS at 20:29

## 2021-12-13 RX ADMIN — TAMSULOSIN HYDROCHLORIDE 0.4 MG: 0.4 CAPSULE ORAL at 20:39

## 2021-12-13 RX ADMIN — BUPROPION HYDROCHLORIDE 150 MG: 150 TABLET, EXTENDED RELEASE ORAL at 10:54

## 2021-12-13 RX ADMIN — CITALOPRAM HYDROBROMIDE 10 MG: 20 TABLET ORAL at 20:28

## 2021-12-13 RX ADMIN — HYDROCODONE BITARTRATE AND ACETAMINOPHEN 1 TABLET: 10; 325 TABLET ORAL at 20:39

## 2021-12-13 NOTE — SIGNIFICANT NOTE
12/13/21 1200   Wound 06/22/21 1133 Left lateral heel   Placement Date/Time: 06/22/21 1133   Present on Hospital Admission: Yes  Side: Left  Orientation: lateral  Location: heel   Wound Image    Dressing Appearance moist drainage; intact; dry   Base red; moist   Periwound dry; intact; pink   Periwound Temperature warm   Periwound Skin Turgor soft   Edges rolled/closed   Wound Length (cm) 2.9 cm   Wound Width (cm) 2.2 cm   Wound Depth (cm) 1.3 cm   Drainage Characteristics/Odor serosanguineous   Drainage Amount small   Care, Wound cleansed with; irrigated with; sterile normal saline   Dressing Care dressing applied; dressing moistened; other (see comments); foam; gauze, dry; gauze; tubular wrap; elastic bandage  (hydrafera blue)   Periwound Care barrier film applied; dry periwound area maintained   Wound 12/02/21 Right anterior foot Incision   Placement Date: 12/02/21   Side: Right  Orientation: anterior  Location: foot  Primary Wound Type: Incision   Wound Image     Dressing Appearance intact; dry; dried drainage; copious drainage   Base red; slough; moist; exposed structure; bleeding   Red (%), Wound Tissue Color 50   Yellow (%), Wound Tissue Color 50   Periwound dry; ecchymotic; redness; intact   Periwound Temperature warm   Periwound Skin Turgor soft   Edges open; irregular   Wound Length (cm) 5.6 cm   Wound Width (cm) 14.6 cm   Wound Depth (cm) 2.9 cm   Drainage Characteristics/Odor serosanguineous   Drainage Amount moderate   Care, Wound cleansed with; irrigated with; sterile normal saline; negative pressure wound therapy   Dressing Care dressing applied; skin barrier agent applied; transparent film   Periwound Care barrier film applied; hydrocolloid barrier applied   NPWT (Negative Pressure Wound Therapy) 12/13/21 1200 Right anterior foot   Placement Date/Time: 12/13/21 1200   Location: Right anterior foot   Therapy Setting continuous therapy   Dressing foam, black   Instillation normal saline   Pressure  Setting 125 mmHg   Sponges Inserted 1     Wound consult/Wound follow-up: Wound consult was placed by MD for possible wound vac placement to right anterior foot r/t midfoot amputation. Dressing to right foot with dried sanguineous drainage. Dressing moistened to loosen adherence to periwound and packing removed, sanguineous drainage noted to packing. Cleansed with copious amount of normal saline, blotted dry with gauze. Wound bed is moist with red and slough tissue present, exposed structure noted to wound bed as well. Periwound is dry and reddened, skin flap with purple tissue; irregular borders noted. Will trial veraflow wound vac to allow cleansing of wound bed. Filled wound bed with 1 black foam, applied skin prep to periwound and covered with silver impregnated foam to borders. Covered with transparent drape, foam compressed and seal obtained. Observed installation phase, no leaking noted. Dwell time of 3 minutes, every 3 hours at 125 mmHg.    Wound bed to left plantar heel is moist, with red granulation noted. Cleansed and irrigated with copious amounts of normal saline. White discoloration noted to edges of wound bed. Wound bed responding well to Hydrafera blue. Filled wound bed with moistened Hydrafera foam, covered with dry gauze. Secured with gauze roll and elastic bandage. Continue with dressing recommendations to left heel. Karon Bolton RN

## 2021-12-13 NOTE — PROGRESS NOTES
[facility]     Progress Note    Patient Name: Jose Shaikh  : 1960  MRN: [id number]  Primary Care Physician:  Corazon Gr MD  Date of admission: 2021      Subjective   Brief summary.  Patient admitted with diabetic foot infection post amputation and exploration      HPI:  Feeling better, patient had wound VAC with the saline irrigation.  No fever chills.  Pain tolerable    Review of Systems     *Fatigue.  Blood sugars doing better.  No fever chills or shortness of breath      Objective     Vitals:   Temp:  [97.7 °F (36.5 °C)-98.6 °F (37 °C)] 98.1 °F (36.7 °C)  Heart Rate:  [60-90] 65  Resp:  [18-20] 20  BP: ()/(51-71) 87/51    Physical Exam :     Morbidly obese male not in acute distress.  Heart regular.  Lungs clear.  Extremity with no significant edema, right foot with wound VAC      Result Review:  I have personally reviewed the results from the time of this admission to 2021 18:37 EST and agree with these findings:  [x]  Laboratory  []  Microbiology  []  Radiology  []  EKG/Telemetry   []  Cardiology/Vascular   []  Pathology  []  Old records  []  Other:           Assessment / Plan       Active Hospital Problems:  Active Hospital Problems    Diagnosis    • Cellulitis and abscess of foot    • Lactic acidosis    • Essential hypertension    • Diabetic ulcer of right midfoot associated with type 2 DM    • Diabetic ulcer of left heel associated with type 2 DM        Plan:   Continue wound care per recommendation from podiatry.  Overall stable.  Check labs.  Continue PT and OT efforts.  Waiting for placement       DVT prophylaxis:  Medical DVT prophylaxis orders are present.    CODE STATUS:   Code Status (Patient has no pulse and is not breathing): CPR (Attempt to Resuscitate)  Medical Interventions (Patient has pulse or is breathing): Full Support            Electronically signed by Yung Grijalva MD, 21, 6:37 PM EST.

## 2021-12-13 NOTE — PLAN OF CARE
Goal Outcome Evaluation:              Pt had no problems on this shift. Medicated with pain meds twice. Wound vac placement today.

## 2021-12-13 NOTE — PLAN OF CARE
Problem: Adult Inpatient Plan of Care  Goal: Plan of Care Review  Outcome: Ongoing, Progressing  Flowsheets  Taken 12/12/2021 0446 by Charla Roque RN  Progress: no change  Outcome Summary: VSS. C/O pain twice, medicated with prn pain medication. No other complaints. All needs met at this time.  Taken 12/10/2021 1851 by Leidy Orosco RN  Plan of Care Reviewed With: patient  Goal: Patient-Specific Goal (Individualized)  Outcome: Ongoing, Progressing  Goal: Absence of Hospital-Acquired Illness or Injury  Outcome: Ongoing, Progressing  Intervention: Prevent and Manage VTE (venous thromboembolism) Risk  Recent Flowsheet Documentation  Taken 12/13/2021 1835 by Katy Esparza RN  VTE Prevention/Management: (lovenox) other (see comments)  Goal: Optimal Comfort and Wellbeing  Outcome: Ongoing, Progressing  Intervention: Provide Person-Centered Care  Recent Flowsheet Documentation  Taken 12/13/2021 1835 by Katy Esparza RN  Trust Relationship/Rapport:   choices provided   emotional support provided   empathic listening provided   questions answered   questions encouraged   reassurance provided  Goal: Readiness for Transition of Care  Outcome: Ongoing, Progressing     Problem: Skin or Soft Tissue Infection  Goal: Infection Symptom Resolution  Outcome: Ongoing, Progressing     Problem: Fall Injury Risk  Goal: Absence of Fall and Fall-Related Injury  Outcome: Ongoing, Progressing     Problem: Skin Injury Risk Increased  Goal: Skin Health and Integrity  Outcome: Ongoing, Progressing   Goal Outcome Evaluation:

## 2021-12-14 LAB
ANION GAP SERPL CALCULATED.3IONS-SCNC: 4.9 MMOL/L (ref 5–15)
BASOPHILS # BLD AUTO: 0.04 10*3/MM3 (ref 0–0.2)
BASOPHILS NFR BLD AUTO: 0.6 % (ref 0–1.5)
BUN SERPL-MCNC: 14 MG/DL (ref 8–23)
BUN/CREAT SERPL: 15.4 (ref 7–25)
CALCIUM SPEC-SCNC: 8.9 MG/DL (ref 8.6–10.5)
CHLORIDE SERPL-SCNC: 101 MMOL/L (ref 98–107)
CO2 SERPL-SCNC: 29.1 MMOL/L (ref 22–29)
CREAT SERPL-MCNC: 0.91 MG/DL (ref 0.76–1.27)
DEPRECATED RDW RBC AUTO: 44.4 FL (ref 37–54)
EOSINOPHIL # BLD AUTO: 0.16 10*3/MM3 (ref 0–0.4)
EOSINOPHIL NFR BLD AUTO: 2.5 % (ref 0.3–6.2)
ERYTHROCYTE [DISTWIDTH] IN BLOOD BY AUTOMATED COUNT: 14.1 % (ref 12.3–15.4)
GFR SERPL CREATININE-BSD FRML MDRD: 84 ML/MIN/1.73
GLUCOSE BLDC GLUCOMTR-MCNC: 134 MG/DL (ref 70–99)
GLUCOSE BLDC GLUCOMTR-MCNC: 138 MG/DL (ref 70–99)
GLUCOSE BLDC GLUCOMTR-MCNC: 201 MG/DL (ref 70–99)
GLUCOSE BLDC GLUCOMTR-MCNC: 217 MG/DL (ref 70–99)
GLUCOSE SERPL-MCNC: 155 MG/DL (ref 65–99)
HCT VFR BLD AUTO: 35.8 % (ref 37.5–51)
HGB BLD-MCNC: 11.7 G/DL (ref 13–17.7)
IMM GRANULOCYTES # BLD AUTO: 0.02 10*3/MM3 (ref 0–0.05)
IMM GRANULOCYTES NFR BLD AUTO: 0.3 % (ref 0–0.5)
LYMPHOCYTES # BLD AUTO: 1.09 10*3/MM3 (ref 0.7–3.1)
LYMPHOCYTES NFR BLD AUTO: 17.1 % (ref 19.6–45.3)
MCH RBC QN AUTO: 28.1 PG (ref 26.6–33)
MCHC RBC AUTO-ENTMCNC: 32.7 G/DL (ref 31.5–35.7)
MCV RBC AUTO: 85.9 FL (ref 79–97)
MONOCYTES # BLD AUTO: 0.82 10*3/MM3 (ref 0.1–0.9)
MONOCYTES NFR BLD AUTO: 12.9 % (ref 5–12)
NEUTROPHILS NFR BLD AUTO: 4.24 10*3/MM3 (ref 1.7–7)
NEUTROPHILS NFR BLD AUTO: 66.6 % (ref 42.7–76)
NRBC BLD AUTO-RTO: 0 /100 WBC (ref 0–0.2)
PLATELET # BLD AUTO: 141 10*3/MM3 (ref 140–450)
PMV BLD AUTO: 11 FL (ref 6–12)
POTASSIUM SERPL-SCNC: 4.7 MMOL/L (ref 3.5–5.2)
RBC # BLD AUTO: 4.17 10*6/MM3 (ref 4.14–5.8)
SODIUM SERPL-SCNC: 135 MMOL/L (ref 136–145)
WBC NRBC COR # BLD: 6.37 10*3/MM3 (ref 3.4–10.8)

## 2021-12-14 PROCEDURE — 94799 UNLISTED PULMONARY SVC/PX: CPT

## 2021-12-14 PROCEDURE — 97164 PT RE-EVAL EST PLAN CARE: CPT

## 2021-12-14 PROCEDURE — 63710000001 DIPHENHYDRAMINE PER 50 MG: Performed by: INTERNAL MEDICINE

## 2021-12-14 PROCEDURE — 97530 THERAPEUTIC ACTIVITIES: CPT

## 2021-12-14 PROCEDURE — 25010000002 PIPERACILLIN SOD-TAZOBACTAM PER 1 G: Performed by: INTERNAL MEDICINE

## 2021-12-14 PROCEDURE — 85025 COMPLETE CBC W/AUTO DIFF WBC: CPT | Performed by: INTERNAL MEDICINE

## 2021-12-14 PROCEDURE — 80048 BASIC METABOLIC PNL TOTAL CA: CPT | Performed by: INTERNAL MEDICINE

## 2021-12-14 PROCEDURE — 25010000002 MORPHINE PER 10 MG: Performed by: PODIATRIST

## 2021-12-14 PROCEDURE — 25010000002 PIPERACILLIN SOD-TAZOBACTAM PER 1 G

## 2021-12-14 PROCEDURE — 99024 POSTOP FOLLOW-UP VISIT: CPT | Performed by: PODIATRIST

## 2021-12-14 PROCEDURE — 97606 NEG PRS WND THER DME>50 SQCM: CPT

## 2021-12-14 PROCEDURE — 82962 GLUCOSE BLOOD TEST: CPT

## 2021-12-14 PROCEDURE — 63710000001 INSULIN DETEMIR PER 5 UNITS: Performed by: PODIATRIST

## 2021-12-14 PROCEDURE — 63710000001 INSULIN LISPRO (HUMAN) PER 5 UNITS: Performed by: PODIATRIST

## 2021-12-14 RX ORDER — DIPHENHYDRAMINE HCL 25 MG
25 CAPSULE ORAL EVERY 6 HOURS PRN
Status: DISCONTINUED | OUTPATIENT
Start: 2021-12-14 | End: 2021-12-21 | Stop reason: HOSPADM

## 2021-12-14 RX ADMIN — BUPROPION HYDROCHLORIDE 150 MG: 150 TABLET, EXTENDED RELEASE ORAL at 20:13

## 2021-12-14 RX ADMIN — LISINOPRIL 20 MG: 20 TABLET ORAL at 09:20

## 2021-12-14 RX ADMIN — CITALOPRAM HYDROBROMIDE 10 MG: 20 TABLET ORAL at 20:13

## 2021-12-14 RX ADMIN — PIPERACILLIN SODIUM AND TAZOBACTAM SODIUM 4.5 G: 4; .5 INJECTION, POWDER, LYOPHILIZED, FOR SOLUTION INTRAVENOUS at 05:47

## 2021-12-14 RX ADMIN — PREGABALIN 25 MG: 25 CAPSULE ORAL at 09:20

## 2021-12-14 RX ADMIN — MORPHINE SULFATE 2 MG: 2 INJECTION, SOLUTION INTRAMUSCULAR; INTRAVENOUS at 09:40

## 2021-12-14 RX ADMIN — PREGABALIN 25 MG: 25 CAPSULE ORAL at 20:13

## 2021-12-14 RX ADMIN — BUPROPION HYDROCHLORIDE 150 MG: 150 TABLET, EXTENDED RELEASE ORAL at 09:20

## 2021-12-14 RX ADMIN — HYDROCODONE BITARTRATE AND ACETAMINOPHEN 1 TABLET: 10; 325 TABLET ORAL at 05:27

## 2021-12-14 RX ADMIN — FAMOTIDINE 40 MG: 20 TABLET ORAL at 09:19

## 2021-12-14 RX ADMIN — APIXABAN 5 MG: 5 TABLET, FILM COATED ORAL at 09:19

## 2021-12-14 RX ADMIN — MORPHINE SULFATE 2 MG: 2 INJECTION, SOLUTION INTRAMUSCULAR; INTRAVENOUS at 19:44

## 2021-12-14 RX ADMIN — INSULIN DETEMIR 25 UNITS: 100 INJECTION, SOLUTION SUBCUTANEOUS at 20:32

## 2021-12-14 RX ADMIN — INSULIN LISPRO 4 UNITS: 100 INJECTION, SOLUTION INTRAVENOUS; SUBCUTANEOUS at 18:24

## 2021-12-14 RX ADMIN — PIPERACILLIN SODIUM AND TAZOBACTAM SODIUM 4.5 G: 4; .5 INJECTION, POWDER, LYOPHILIZED, FOR SOLUTION INTRAVENOUS at 15:52

## 2021-12-14 RX ADMIN — SODIUM CHLORIDE, PRESERVATIVE FREE 10 ML: 5 INJECTION INTRAVENOUS at 20:26

## 2021-12-14 RX ADMIN — TAMSULOSIN HYDROCHLORIDE 0.4 MG: 0.4 CAPSULE ORAL at 20:14

## 2021-12-14 RX ADMIN — MORPHINE SULFATE 2 MG: 2 INJECTION, SOLUTION INTRAMUSCULAR; INTRAVENOUS at 16:14

## 2021-12-14 RX ADMIN — PIPERACILLIN SODIUM AND TAZOBACTAM SODIUM 4.5 G: 4; .5 INJECTION, POWDER, LYOPHILIZED, FOR SOLUTION INTRAVENOUS at 22:03

## 2021-12-14 RX ADMIN — ATORVASTATIN CALCIUM 10 MG: 10 TABLET, FILM COATED ORAL at 20:13

## 2021-12-14 RX ADMIN — INSULIN LISPRO 4 UNITS: 100 INJECTION, SOLUTION INTRAVENOUS; SUBCUTANEOUS at 20:33

## 2021-12-14 RX ADMIN — MORPHINE SULFATE 2 MG: 2 INJECTION, SOLUTION INTRAMUSCULAR; INTRAVENOUS at 05:27

## 2021-12-14 RX ADMIN — DIPHENHYDRAMINE HYDROCHLORIDE 25 MG: 25 CAPSULE ORAL at 16:14

## 2021-12-14 RX ADMIN — APIXABAN 5 MG: 5 TABLET, FILM COATED ORAL at 20:13

## 2021-12-14 RX ADMIN — METOPROLOL SUCCINATE 25 MG: 25 TABLET, EXTENDED RELEASE ORAL at 09:19

## 2021-12-14 RX ADMIN — HYDROCODONE BITARTRATE AND ACETAMINOPHEN 1 TABLET: 10; 325 TABLET ORAL at 16:14

## 2021-12-14 RX ADMIN — HYDROCODONE BITARTRATE AND ACETAMINOPHEN 1 TABLET: 10; 325 TABLET ORAL at 09:40

## 2021-12-14 RX ADMIN — MORPHINE SULFATE 2 MG: 2 INJECTION, SOLUTION INTRAMUSCULAR; INTRAVENOUS at 22:03

## 2021-12-14 RX ADMIN — HYDROCODONE BITARTRATE AND ACETAMINOPHEN 1 TABLET: 10; 325 TABLET ORAL at 20:14

## 2021-12-14 NOTE — PLAN OF CARE
Problem: Adult Inpatient Plan of Care  Goal: Plan of Care Review  Outcome: Ongoing, Progressing  Flowsheets  Taken 12/14/2021 0302 by Tanya Lopez, RN  Outcome Summary: Medicated with pain meds. fluorovac in place. continue with IV antibiotics.  Taken 12/12/2021 0446 by Charla Roque, RN  Progress: no change  Taken 12/10/2021 1851 by Leidy Orosco, RN  Plan of Care Reviewed With: patient  Goal: Patient-Specific Goal (Individualized)  Outcome: Ongoing, Progressing  Goal: Absence of Hospital-Acquired Illness or Injury  Outcome: Ongoing, Progressing  Goal: Optimal Comfort and Wellbeing  Outcome: Ongoing, Progressing  Intervention: Provide Person-Centered Care  Recent Flowsheet Documentation  Taken 12/14/2021 1500 by Katy Esparza, RN  Trust Relationship/Rapport:   care explained   choices provided   emotional support provided   empathic listening provided   questions answered   questions encouraged  Goal: Readiness for Transition of Care  Outcome: Ongoing, Progressing     Problem: Skin or Soft Tissue Infection  Goal: Infection Symptom Resolution  Outcome: Ongoing, Progressing     Problem: Fall Injury Risk  Goal: Absence of Fall and Fall-Related Injury  Outcome: Ongoing, Progressing     Problem: Skin Injury Risk Increased  Goal: Skin Health and Integrity  Outcome: Ongoing, Progressing   Goal Outcome Evaluation:

## 2021-12-14 NOTE — NURSING NOTE
Pt complaining of shooting pain and stabbing pain in right leg. Above where his wound vac is setup. His leg looks to have slight swelling on top of leg and is warm to the touch. Notified dr and he said no concern of clot and to contact podiatrist. Message and received no response.

## 2021-12-14 NOTE — THERAPY RE-EVALUATION
Acute Care - Physical Therapy Re-Evaluation and Treatment Note  KEO Dominguez     Patient Name: Jose Shaikh  : 1960  MRN: [id number]  Today's Date: 2021      Visit Dx:     ICD-10-CM ICD-9-CM   1. Diabetic ulcer of right midfoot associated with type 2 DM  E11.621 250.80    L97.415 707.14   2. Diabetic ulcer of right midfoot associated with type 2 diabetes mellitus, with muscle involvement without evidence of necrosis (HCC)  E11.621 250.80    L97.415 707.14   3. Difficulty walking  R26.2 719.7   4. Cellulitis and abscess of foot  L03.119 682.7    L02.619      Patient Active Problem List   Diagnosis   • Diabetic ulcer of left heel associated with type 2 DM   • Acute osteomyelitis of left calcaneus    • Diabetic ulcer of left heel associated with type 2 DM   • Diabetic ulcer of right midfoot associated with type 2 DM   • Paroxysmal atrial fibrillation   • Essential hypertension   • Hyperlipidemia LDL goal <100   • Cellulitis and abscess of foot   • Lactic acidosis     Past Medical History:   Diagnosis Date   • Absence of toe of right foot    • Acute osteomyelitis of left calcaneus  2021   • Anxiety and depression    • Arthritis    • Claustrophobia    • Corns and callus    • Diabetic ulcer of left heel associated with type 2 DM 2021   • Diabetic ulcer of left heel associated with type 2 DM 2021   • Diabetic ulcer of right midfoot associated with type 2 DM 2021   • Difficulty walking    • Essential hypertension 2021   • Hammertoe    • Hyperlipidemia LDL goal <100 2021   • Ingrown toenail    • Obesity    • Paroxysmal atrial fibrillation 2021   • Polyneuropathy    • Pressure ulcer, stage 1    • Tinea unguium    • Type 2 diabetes mellitus with polyneuropathy      Past Surgical History:   Procedure Laterality Date   • CYST REMOVAL      center of back; benign   • INCISION AND DRAINAGE ABSCESS      back   • INCISION AND DRAINAGE LEG Right 12/10/2021    Procedure: INCISION AND  DRAINAGE LOWER EXTREMITY;  Surgeon: Ash Leyva DPM;  Location: [facility] MAIN OR;  Service: Podiatry;  Laterality: Right;   • OTHER SURGICAL HISTORY      Surgical clips left foot   • TOE SURGERY Right     Removal of 5th toe   • TRANS METATARSAL AMPUTATION Right 12/2/2021    Procedure: AMPUTATION TRANS METATARSAL;  Surgeon: Ash Leyva DPM;  Location: formerly [facility] MAIN OR;  Service: Podiatry;  Laterality: Right;   • WRIST SURGERY Left     repair of injury     PT Assessment (last 12 hours)     PT Evaluation and Treatment     Row Name 12/14/21 1500          Physical Therapy Time and Intention    Subjective Information no complaints  -CS     Document Type re-evaluation; therapy note (daily note)  -CS     Mode of Treatment individual therapy; physical therapy  -CS     Patient Effort fair  -CS     Row Name 12/14/21 1500          General Information    Patient Profile Reviewed yes  -CS     Patient Observations alert; cooperative  -CS     Existing Precautions/Restrictions non-weight bearing; right; other (see comments)  wound vac on right foot  -CS     Row Name 12/14/21 1500          Range of Motion Comprehensive    General Range of Motion bilateral lower extremity ROM WFL  -CS     Comment, General Range of Motion R ankle limited due to post surgical wrap after amputation  -CS     Row Name 12/14/21 1500          Strength Comprehensive (MMT)    General Manual Muscle Testing (MMT) Assessment lower extremity strength deficits identified  -CS     Comment, General Manual Muscle Testing (MMT) Assessment BLEs assessed at 4/5 and R ankle deferred due to post ampuation and wound vac  -CS     Row Name 12/14/21 1500          Mobility    Extremity Weight-bearing Status right lower extremity  -CS     Right Lower Extremity (Weight-bearing Status) non weight-bearing (NWB)  -CS     Row Name 12/14/21 1500          Bed Mobility    Bed Mobility rolling left; rolling right; scooting/bridging; supine-sit  -CS     Rolling Left  [place] (Bed Mobility) contact guard; minimum assist (75% patient effort); verbal cues  -CS     Rolling Right [place] (Bed Mobility) contact guard; minimum assist (75% patient effort); verbal cues  -CS     Scooting/Bridging [place] (Bed Mobility) moderate assist (50% patient effort); maximum assist (25% patient effort)  -CS     Supine-Sit [place] (Bed Mobility) verbal cues; contact guard; minimum assist (75% patient effort)  -CS     Bed Mobility, Safety Issues decreased use of arms for pushing/pulling; decreased use of legs for bridging/pushing  -CS     Row Name 12/14/21 1500          Transfers    Comment (Transfers) Patient declined further transfers today, but stated he has been doing stand pivot sits to and from [facility]  -     Row Name 12/14/21 1500          Safety Issues, Functional Mobility    Safety Issues Affecting Function (Mobility) insight into deficits/self-awareness; awareness of need for assistance  -     Impairments Affecting Function (Mobility) balance; endurance/activity tolerance; strength  -     Row Name 12/14/21 1500          Balance    Balance Assessment sitting dynamic balance  -     Static Sitting Balance WFL  -CS     Dynamic Sitting Balance WFL  -CS     Row Name             Wound 06/22/21 1133 Left lateral heel    Wound - Properties Group Placement Date: 06/22/21  -FABIOLA Placement Time: 1133  -FABIOLA Present on Hospital Admission: Y  -FABIOLA Side: Left  -FABIOLA Orientation: lateral  -FABIOLA Location: heel  -FABIOLA     Retired Wound - Properties Group Date first assessed: 06/22/21  -FABIOLA Time first assessed: 1133  -FABIOLA Present on Hospital Admission: Y  -FABIOLA Side: Left  -FABIOLA Location: heel  -FABIOLA     Row Name             Wound 12/02/21 Right anterior foot Incision    Wound - Properties Group Placement Date: 12/02/21  -ES Side: Right  -ES Orientation: anterior  -ES Location: foot  -ES Primary Wound Type: Incision  -ES     Retired Wound - Properties Group Date first assessed: 12/02/21  -ES Side: Right   -ES Location: foot  -ES Primary Wound Type: Incision  -ES     Row Name             NPWT (Negative Pressure Wound Therapy) 12/13/21 1200 Right anterior foot    NPWT (Negative Pressure Wound Therapy) - Properties Group Placement Date: 12/13/21  -RASHARD Placement Time: 1200  -RASHARD Location: Right anterior foot  -RASHARD     Retired NPWT (Negative Pressure Wound Therapy) - Properties Group Placement Date: 12/13/21  -RASHARD Placement Time: 1200  -RASHARD Location: Right anterior foot  -RASHARD     Row Name 12/14/21 1500          Plan of Care Review    Plan of Care Reviewed With patient  -CS     Progress no change  -CS     Outcome Summary To optimize functional mobility and independence, skilled PT services needed to address mobility needs, strength, endurance, and balance impairments.  -CS     Row Name 12/14/21 1500          Bed Mobility Goal 1 (PT)    Activity/Assistive Device (Bed Mobility Goal 1, PT) bed mobility activities, all  -CS     Time Frame (Bed Mobility Goal 1, PT) 10 days; long term goal (LTG)  -CS     Progress/Outcomes (Bed Mobility Goal 1, PT) goal revised this date; goal not met; continuing progress toward goal  -CS     Row Name 12/14/21 1500          Transfer Goal 1 (PT)    Activity/Assistive Device (Transfer Goal 1, PT) sit-to-stand/stand-to-sit; walker, rolling; bed-to-chair/chair-to-bed  -CS     [place] Level/Cues Needed (Transfer Goal 1, PT) standby assist  -CS     Time Frame (Transfer Goal 1, PT) 10 days; long term goal (LTG)  -CS     Progress/Outcome (Transfer Goal 1, PT) goal not met; goal revised this date; continuing progress toward goal  -CS     Row Name 12/14/21 1500          Gait Training Goal 1 (PT)    Distance (Gait Training Goal 1, PT) 10  -CS     Time Frame (Gait Training Goal 1, PT) 10 days; long term goal (LTG)  -CS     Progress/Outcome (Gait Training Goal 1, PT) goal not met; goal revised this date; continuing progress toward goal  -CS     Row Name 12/14/21 1500          Progress Summary (PT)    Progress  Toward Functional Goals (PT) progress toward functional goals is fair  -CS     Daily Progress Summary (PT) Patient agreeable to treatment but also required increased encouragement and educatio non importance of exercises and functional mobility.  -CS     Row Name 12/14/21 1500          Therapy Plan Review/Discharge Plan (PT)    Therapy Plan Review (PT) patient  -CS           User Key  (r) = Recorded By, (t) = Taken By, (c) = Cosigned By    Initials Name Provider Type    Karon Jordan, RN Registered Nurse    Marlen Vaughn RN Registered Nurse    Kim Avila, PT Physical Therapist    Katlyn Garcia, RN Registered Nurse                Physical Therapy Education                 Title: PT OT SLP Therapies (Done)     Topic: Physical Therapy (Done)     Point: Mobility training (Done)     Learning Progress Summary           Patient Acceptance, E, VU,NR by  at 12/14/2021 1605      Show all documentation for this point (4)                 Point: Home exercise program (Done)     Learning Progress Summary           Patient Acceptance, E,TB, VU by  at 12/14/2021 1529      Show all documentation for this point (3)                 Point: Body mechanics (Done)     Learning Progress Summary           Patient Acceptance, E,TB, VU by  at 12/14/2021 1529      Show all documentation for this point (3)                 Point: Precautions (Done)     Learning Progress Summary           Patient Acceptance, E, VU,NR by  at 12/14/2021 1605      Show all documentation for this point (4)                             User Key     Initials Effective Dates Name Provider Type Discipline     04/25/21 -  Kim Esparza, PT Physical Therapist PT    AW 06/21/21 -  Katy Esparza, RN Registered Nurse Nurse              PT Recommendation and Plan  Anticipated Discharge Disposition (PT): sub acute care setting, extended care facility  Progress Summary (PT)  Progress Toward Functional Goals (PT): progress toward functional  goals is fair  Daily Progress Summary (PT): Patient agreeable to treatment but also required increased encouragement and educatio non importance of exercises and functional mobility.  Plan of Care Reviewed With: patient  Progress: no change  Outcome Summary: To optimize functional mobility and independence, skilled PT services needed to address mobility needs, strength, endurance, and balance impairments.   Outcome Measures     Row Name 12/14/21 1500 12/12/21 1200          How much help from another person do you currently need...    Turning from your back to your side while in flat bed without using bedrails? 3  -CS 3  -CSA     Moving from lying on back to sitting on the side of a flat bed without bedrails? 3  -CS 3  -CSA     Moving to and from a bed to a chair (including a wheelchair)? 2  -CS 2  -CSA     Standing up from a chair using your arms (e.g., wheelchair, bedside chair)? 2  -CS 2  -CSA     Climbing 3-5 steps with a railing? 1  -CS 1  -CSA     To walk in hospital room? 1  -CS 1  -CSA     AM-PAC 6 Clicks Score (PT) 12  -CS 12  -CSA            Functional Assessment    Outcome Measure Options AM-PAC 6 Clicks Basic Mobility (PT)  -CS AM-PAC 6 Clicks Basic Mobility (PT)  -CSA           User Key  (r) = Recorded By, (t) = Taken By, (c) = Cosigned By    Initials Name Provider Type    CS Kim Esparza, PT Physical Therapist    Ronald Fisher, CURTIS Physical Therapy Assistant                 Time Calculation:    PT Charges     Row Name 12/14/21 1543             Time Calculation    PT Received On 12/14/21  -CS      PT Goal Re-Cert Due Date 12/23/21  -CS              Timed Charges    98002 - PT Therapeutic Activity Minutes 10  -CS              Untimed Charges    PT Eval/Re-eval Minutes 15  -CS              Total Minutes    Timed Charges Total Minutes 10  -CS      Untimed Charges Total Minutes 15  -CS       Total Minutes 25  -CS            User Key  (r) = Recorded By, (t) = Taken By, (c) = Cosigned By    Initials  Name Provider Type    CS Kim Esparza, PT Physical Therapist              Therapy Charges for Today     Code Description Service Date Service Provider Modifiers Qty    58089169206 HC PT THERAPEUTIC ACT EA 15 MIN 12/14/2021 Kim Esparza, PT GP 1    86576453177 HC PT RE-EVAL ESTABLISHED PLAN 2 12/14/2021 Kim Esparza, PT GP 1          PT G-Codes  Outcome Measure Options: AM-PAC 6 Clicks Basic Mobility (PT)  AM-PAC 6 Clicks Score (PT): 12    Kim Esparza, PT  12/14/2021

## 2021-12-14 NOTE — PROGRESS NOTES
[facility] - PODIATRY    Today's Date: 12/14/21    Patient Name: Jose Shaikh  MRN: [id number]  CSN: [id number]  PCP: Corazon Gr MD  Referring Provider: Yung Grijalva MD  Attending Provider: Yung Grijalva MD  Length of Stay: 13    SUBJECTIVE   Chief Complaint: Right foot osteomyelitis    HPI: Jose Shaikh, a 61 y.o.male,     Procedure: I&D of right midfoot  Date: 10 December 2021    Procedure: Right midfoot amputation  Date: 2 December 2021    Patient states they are doing well without complications.  Patient states they are following post-op instructions.  Patient states pain is controlled.      Patient states he is doing well with the Radhika flow VAC dressing on his right foot    Patient denies any fevers, chills, nausea, vomiting, shortness of breathe, nor any other constitutional signs nor symptoms.      Past Medical History:   Diagnosis Date   • Absence of toe of right foot    • Acute osteomyelitis of left calcaneus  8/18/2021   • Anxiety and depression    • Arthritis    • Claustrophobia    • Corns and callus    • Diabetic ulcer of left heel associated with type 2 DM 8/18/2021   • Diabetic ulcer of left heel associated with type 2 DM 7/6/2021   • Diabetic ulcer of right midfoot associated with type 2 DM 8/18/2021   • Difficulty walking    • Essential hypertension 8/31/2021   • Hammertoe    • Hyperlipidemia LDL goal <100 8/31/2021   • Ingrown toenail    • Obesity    • Paroxysmal atrial fibrillation 8/31/2021   • Polyneuropathy    • Pressure ulcer, stage 1    • Tinea unguium    • Type 2 diabetes mellitus with polyneuropathy      Past Surgical History:   Procedure Laterality Date   • CYST REMOVAL      center of back; benign   • INCISION AND DRAINAGE ABSCESS      back   • INCISION AND DRAINAGE LEG Right 12/10/2021    Procedure: INCISION AND DRAINAGE LOWER EXTREMITY;  Surgeon: Ash Leyva DPM;  Location: [facility] MAIN OR;  Service: Podiatry;  Laterality: Right;   • OTHER SURGICAL  HISTORY      Surgical clips left foot   • TOE SURGERY Right     Removal of 5th toe   • TRANS METATARSAL AMPUTATION Right 2021    Procedure: AMPUTATION TRANS METATARSAL;  Surgeon: Ash Leyva DPM;  Location: [facility] MAIN OR;  Service: Podiatry;  Laterality: Right;   • WRIST SURGERY Left     repair of injury     Family History   Problem Relation Age of Onset   • Heart disease Mother    • Heart disease Father    • Cancer Father         Unspecified   • Heart disease Brother      Social History     Socioeconomic History   • Marital status: Single   Tobacco Use   • Smoking status: Former Smoker     Packs/day: 0.00     Years: 1.00     Pack years: 0.00     Quit date: 1991     Years since quittin.3   • Smokeless tobacco: Never Used   • Tobacco comment: quit at age 30   Vaping Use   • Vaping Use: Never used   Substance and Sexual Activity   • Alcohol use: Yes     Comment: Rare   • Drug use: Yes     Types: Marijuana     Comment: Daily   • Sexual activity: Defer     Allergies   Allergen Reactions   • Adhesive Tape Rash     Current Facility-Administered Medications   Medication Dose Route Frequency Provider Last Rate Last Admin   • acetaminophen (TYLENOL) suppository 650 mg  650 mg Rectal Q4H PRN Ash Leyva DPM       • acetaminophen (TYLENOL) tablet 650 mg  650 mg Oral Q4H PRN Ash Leyva DPM   650 mg at 21   • aluminum-magnesium hydroxide-simethicone (MAALOX MAX) 400-400-40 MG/5ML suspension 15 mL  15 mL Oral Q6H PRN Ash Leyva DPM       • apixaban (ELIQUIS) tablet 5 mg  5 mg Oral Q12H Ash Leyva DPM   5 mg at 21   • atorvastatin (LIPITOR) tablet 10 mg  10 mg Oral Nightly Ash Leyva DPM   10 mg at 21   • sennosides-docusate (PERICOLACE) 8.6-50 MG per tablet 1 tablet  1 tablet Oral BID Ash Leyva DPM   1 tablet at 21    And   • polyethylene glycol (MIRALAX) packet 17 g  17 g Oral  Daily PRN Ash Leyva DPM        And   • bisacodyl (DULCOLAX) EC tablet 5 mg  5 mg Oral Daily PRN Ash Leyva DPM        And   • bisacodyl (DULCOLAX) suppository 10 mg  10 mg Rectal Daily PRN Ash Leyva DPM       • buPROPion XL (WELLBUTRIN XL) 24 hr tablet 150 mg  150 mg Oral BID Ash Leyva DPM   150 mg at 12/14/21 0920   • citalopram (CeleXA) tablet 10 mg  10 mg Oral Daily Ash Leyva DPM   10 mg at 12/13/21 2028   • dextrose (GLUTOSE) oral gel 15 g  15 g Oral Q15 Min PRN Ash Leyva DPM       • dextrose 10 % infusion  25 g Intravenous Q15 Min PRN Ash Leyva DPM       • diphenhydrAMINE (BENADRYL) capsule 25 mg  25 mg Oral Q6H PRN Yung Grijalva MD       • famotidine (PEPCID) tablet 40 mg  40 mg Oral Daily Ash Leyva DPM   40 mg at 12/14/21 0919   • glucagon (human recombinant) (GLUCAGEN DIAGNOSTIC) injection 1 mg  1 mg Subcutaneous Q15 Min PRN Ash Leyva DPM       • HYDROcodone-acetaminophen (NORCO)  MG per tablet 1 tablet  1 tablet Oral Q4H PRN Ash Leyva DPM   1 tablet at 12/14/21 0940   • HYDROcodone-acetaminophen (NORCO) 5-325 MG per tablet 1 tablet  1 tablet Oral Q4H PRN Ash Leyva DPM   1 tablet at 12/13/21 0533   • insulin detemir (LEVEMIR) injection 25 Units  25 Units Subcutaneous Nightly Ash Leyva DPM   25 Units at 12/13/21 2028   • insulin lispro (humaLOG) injection 0-9 Units  0-9 Units Subcutaneous 4x Daily With Meals & Nightly Ash Leyva DPM   4 Units at 12/13/21 2029   • lactated ringers infusion  9 mL/hr Intravenous Continuous PRN Gordon, Ash Love, DPM       • lisinopril (PRINIVIL,ZESTRIL) tablet 20 mg  20 mg Oral Daily Ash Leyva DPM   20 mg at 12/14/21 0920   • loperamide (IMODIUM) capsule 2 mg  2 mg Oral Q4H PRN Ash Leyva DPM       • metoprolol succinate XL (TOPROL-XL) 24 hr tablet 25 mg  25 mg Oral  Q24H Ash Leyva DPM   25 mg at 12/14/21 0919   • morphine injection 2 mg  2 mg Intravenous Q2H PRN Ash Leyva DPM   2 mg at 12/14/21 0940   • naloxone (NARCAN) injection 0.4 mg  0.4 mg Intravenous Q5 Min PRN Ash Leyva DPM       • ondansetron (ZOFRAN) tablet 4 mg  4 mg Oral Q6H PRN Ash Leyva DPM        Or   • ondansetron (ZOFRAN) injection 4 mg  4 mg Intravenous Q6H PRN Ash Leyva DPM       • Pharmacy to Dose Zosyn   Does not apply Continuous PRN Ash Leyva DPM       • piperacillin-tazobactam (ZOSYN) 4.5 g/100 mL 0.9% NS IVPB (mbp)  4.5 g Intravenous Q8H Yung Grijalva MD 0 mL/hr at 12/13/21 1750 4.5 g at 12/14/21 1552   • pregabalin (LYRICA) capsule 25 mg  25 mg Oral Q12H Ash Leyva DPM   25 mg at 12/14/21 0920   • sodium chloride 0.9 % flush 10 mL  10 mL Intravenous Q12H Ash Leyva DPM   10 mL at 12/13/21 2029   • sodium chloride 0.9 % flush 10 mL  10 mL Intravenous PRN Ash Leyva DPM       • tamsulosin (FLOMAX) 24 hr capsule 0.4 mg  0.4 mg Oral Nightly Ash Leyva DPM   0.4 mg at 12/13/21 2039     Review of Systems   Constitutional: Negative.    Skin:        Bilateral foot ulcers   All other systems reviewed and are negative.      OBJECTIVE     Vitals:    12/14/21 1515   BP: 96/52   Pulse: 66   Resp: 18   Temp: 98.2 °F (36.8 °C)   SpO2: 94%       PHYSICAL EXAM    GEN:   A&Ox3, NAD. Pt presents in hospital bed.     Neurovascular status unchanged    Ortho: Right midfoot amputation    Right foot: VeroFlo VAC dressing intact with no leaks.  Blood-tinged serous drainage present in collection tube and container.  No surrounding edema nor erythema in right foot and lower leg.    Left plantar heel shows stage III ulceration with granular tissue present.  No signs of edema, erythema, lymphangitis, nor signs of infection.      LABORATORY/CULTURE RESULTS:  Results from last 7 days   Lab Units  12/14/21  0513 12/10/21  0626   WBC 10*3/mm3 6.37 8.17   HEMOGLOBIN g/dL 11.7* 11.4*   HEMATOCRIT % 35.8* 34.4*   PLATELETS 10*3/mm3 141 129*     Results from last 7 days   Lab Units 12/14/21  0513 12/10/21  0626   SODIUM mmol/L 135* 134*   POTASSIUM mmol/L 4.7 4.3   CHLORIDE mmol/L 101 102   CO2 mmol/L 29.1* 24.1   BUN mg/dL 14 9   CREATININE mg/dL 0.91 0.81   CALCIUM mg/dL 8.9 8.3*   BILIRUBIN mg/dL  --  0.8   ALK PHOS U/L  --  327*   ALT (SGPT) U/L  --  50*   AST (SGOT) U/L  --  62*   GLUCOSE mg/dL 155* 149*         Microbiology Results (last 10 days)     Procedure Component Value - Date/Time    Anaerobic Culture - Wound, Foot, Right [[id number]]  (Normal) Collected: 12/10/21 1835    Lab Status: Preliminary result Specimen: Wound from Foot, Right Updated: 12/13/21 0827     Anaerobic Culture No anaerobes isolated at 3 days    Wound Culture - Wound, Foot, Right [[id number]]  (Abnormal) Collected: 12/10/21 1835    Lab Status: Final result Specimen: Wound from Foot, Right Updated: 12/12/21 0950     Wound Culture Moderate growth (3+) Corynebacterium species     Comment: No definitive guidelines. All are susceptible to vancomycin. Resistance to penicillins & cephalosporins does occur.        Gram Stain Moderate (3+) WBCs seen      Rare (1+) Gram negative bacilli, tiny           ASSESSMENT/PLAN     Patient Active Problem List   Diagnosis   • Diabetic ulcer of left heel associated with type 2 DM   • Acute osteomyelitis of left calcaneus    • Diabetic ulcer of left heel associated with type 2 DM   • Diabetic ulcer of right midfoot associated with type 2 DM   • Paroxysmal atrial fibrillation   • Essential hypertension   • Hyperlipidemia LDL goal <100   • Cellulitis and abscess of foot   • Lactic acidosis       Comprehensive lower extremity examination and evaluation was performed.    Continue current wound care orders.  Reevaluate tomorrow.    Recommend:  -Rehabilitation placement after discharge from acute stay    Plan was  discussed with the patient's nurse.    Discussed with patient's nurse.      Lab Frequency Next Occurrence   Lipid Panel Once 02/27/2022   WOUND CARE HYPERBARIC weekdays        This document has been electronically signed by Ash Leyva DPM on December 14, 2021 16:11 EST

## 2021-12-14 NOTE — SIGNIFICANT NOTE
12/14/21 1200   NPWT (Negative Pressure Wound Therapy) 12/13/21 1200 Right anterior foot   Placement Date/Time: 12/13/21 1200   Location: Right anterior foot   Therapy Setting intermittent therapy  (Veraflo)   Dressing foam, silver   Instillation normal saline   Pressure Setting 125 mmHg   Sponges Inserted 2   Sponges Removed 1   Finger sweep complete Yes         Wound follow-up:   Wound vac placed yesterday per MD orders.  Today dressing removed to reveal pink bleeding wound bed.  Exposed structure noted to wound bed as well. Periwound skin has improved from previous assessment and is less inflamed today. Plantar flap dusky.   Veraflo wound vac replaced to allow further cleansing of wound bed in preparation for closure.   Filled wound bed with 2 gray foams, applied skin prep to periwound and covered with silver impregnated foam to borders. Covered with transparent drape, foam compressed and seal obtained. Observed installation phase, no leaking noted. Dwell time of 3 minutes, every 3 hours at 125 mmHg.

## 2021-12-14 NOTE — PLAN OF CARE
Goal Outcome Evaluation:  Plan of Care Reviewed With: patient        Progress: no change  Outcome Summary: To optimize functional mobility and independence, skilled PT services needed to address mobility needs, strength, endurance, and balance impairments.

## 2021-12-14 NOTE — PROGRESS NOTES
[facility]     Progress Note    Patient Name: Jose Shaikh  : 1960  MRN: [id number]  Primary Care Physician:  Corazon Gr MD  Date of admission: 2021      Subjective   Brief summary.  Patient admitted with diabetic foot infection post amputation and exploration      HPI:  Feeling better, patient had wound VAC with the saline irrigation.  Blood sugars doing well.  Pain tolerable with current regimen    Review of Systems     Weakness and fatigue  No fever chills or shortness of breath      Objective     Vitals:   Temp:  [97.7 °F (36.5 °C)-98.2 °F (36.8 °C)] 98.2 °F (36.8 °C)  Heart Rate:  [59-97] 59  Resp:  [18-20] 18  BP: ()/(51-80) 100/65    Physical Exam :     Morbidly obese male not in acute distress.  Heart regular.  Lungs clear.  Abdomen obese and soft  Extremity with no significant edema, right foot with wound VAC      Result Review:  I have personally reviewed the results from the time of this admission to 2021 14:01 EST and agree with these findings:  [x]  Laboratory  []  Microbiology  []  Radiology  []  EKG/Telemetry   []  Cardiology/Vascular   []  Pathology  []  Old records  []  Other:           Assessment / Plan       Active Hospital Problems:  Active Hospital Problems    Diagnosis    • Cellulitis and abscess of foot    • Lactic acidosis    • Essential hypertension    • Diabetic ulcer of right midfoot associated with type 2 DM    • Diabetic ulcer of left heel associated with type 2 DM        Plan:   Continue wound care per recommendation from podiatry.  Pictures reviewed, patient has significant wound in the foot .  Continue IV antibiotics labs reviewed .  Stable .  Waiting for placement       DVT prophylaxis:  Medical DVT prophylaxis orders are present.    CODE STATUS:   Code Status (Patient has no pulse and is not breathing): CPR (Attempt to Resuscitate)  Medical Interventions (Patient has pulse or is breathing): Full Support              Electronically signed by  Yung Grijalva MD, 12/14/21, 2:02 PM EST.

## 2021-12-14 NOTE — PLAN OF CARE
Goal Outcome Evaluation:           Progress: no change  Outcome Summary: Medicated with pain meds. fluorovac in place. continue with IV antibiotics.

## 2021-12-15 LAB
BACTERIA SPEC ANAEROBE CULT: NORMAL
GLUCOSE BLDC GLUCOMTR-MCNC: 131 MG/DL (ref 70–99)
GLUCOSE BLDC GLUCOMTR-MCNC: 173 MG/DL (ref 70–99)
GLUCOSE BLDC GLUCOMTR-MCNC: 178 MG/DL (ref 70–99)
GLUCOSE BLDC GLUCOMTR-MCNC: 225 MG/DL (ref 70–99)

## 2021-12-15 PROCEDURE — 97110 THERAPEUTIC EXERCISES: CPT

## 2021-12-15 PROCEDURE — 63710000001 INSULIN DETEMIR PER 5 UNITS: Performed by: PODIATRIST

## 2021-12-15 PROCEDURE — 94799 UNLISTED PULMONARY SVC/PX: CPT

## 2021-12-15 PROCEDURE — 82962 GLUCOSE BLOOD TEST: CPT

## 2021-12-15 PROCEDURE — 25010000002 MORPHINE PER 10 MG: Performed by: PODIATRIST

## 2021-12-15 PROCEDURE — 99024 POSTOP FOLLOW-UP VISIT: CPT | Performed by: PODIATRIST

## 2021-12-15 PROCEDURE — 63710000001 INSULIN LISPRO (HUMAN) PER 5 UNITS: Performed by: PODIATRIST

## 2021-12-15 PROCEDURE — 99221 1ST HOSP IP/OBS SF/LOW 40: CPT | Performed by: NURSE PRACTITIONER

## 2021-12-15 PROCEDURE — 97530 THERAPEUTIC ACTIVITIES: CPT

## 2021-12-15 PROCEDURE — 25010000002 PIPERACILLIN SOD-TAZOBACTAM PER 1 G: Performed by: INTERNAL MEDICINE

## 2021-12-15 RX ADMIN — INSULIN LISPRO 2 UNITS: 100 INJECTION, SOLUTION INTRAVENOUS; SUBCUTANEOUS at 21:39

## 2021-12-15 RX ADMIN — APIXABAN 5 MG: 5 TABLET, FILM COATED ORAL at 21:21

## 2021-12-15 RX ADMIN — INSULIN LISPRO 6 UNITS: 100 INJECTION, SOLUTION INTRAVENOUS; SUBCUTANEOUS at 11:39

## 2021-12-15 RX ADMIN — HYDROCODONE BITARTRATE AND ACETAMINOPHEN 1 TABLET: 10; 325 TABLET ORAL at 02:30

## 2021-12-15 RX ADMIN — HYDROCODONE BITARTRATE AND ACETAMINOPHEN 1 TABLET: 10; 325 TABLET ORAL at 09:55

## 2021-12-15 RX ADMIN — BUPROPION HYDROCHLORIDE 150 MG: 150 TABLET, EXTENDED RELEASE ORAL at 21:21

## 2021-12-15 RX ADMIN — PREGABALIN 25 MG: 25 CAPSULE ORAL at 21:21

## 2021-12-15 RX ADMIN — PIPERACILLIN SODIUM AND TAZOBACTAM SODIUM 4.5 G: 4; .5 INJECTION, POWDER, LYOPHILIZED, FOR SOLUTION INTRAVENOUS at 05:52

## 2021-12-15 RX ADMIN — PREGABALIN 25 MG: 25 CAPSULE ORAL at 08:44

## 2021-12-15 RX ADMIN — ATORVASTATIN CALCIUM 10 MG: 10 TABLET, FILM COATED ORAL at 21:21

## 2021-12-15 RX ADMIN — LISINOPRIL 20 MG: 20 TABLET ORAL at 08:44

## 2021-12-15 RX ADMIN — INSULIN DETEMIR 25 UNITS: 100 INJECTION, SOLUTION SUBCUTANEOUS at 21:38

## 2021-12-15 RX ADMIN — MORPHINE SULFATE 2 MG: 2 INJECTION, SOLUTION INTRAMUSCULAR; INTRAVENOUS at 15:00

## 2021-12-15 RX ADMIN — TAMSULOSIN HYDROCHLORIDE 0.4 MG: 0.4 CAPSULE ORAL at 21:21

## 2021-12-15 RX ADMIN — MORPHINE SULFATE 2 MG: 2 INJECTION, SOLUTION INTRAMUSCULAR; INTRAVENOUS at 22:44

## 2021-12-15 RX ADMIN — INSULIN LISPRO 2 UNITS: 100 INJECTION, SOLUTION INTRAVENOUS; SUBCUTANEOUS at 17:24

## 2021-12-15 RX ADMIN — PIPERACILLIN SODIUM AND TAZOBACTAM SODIUM 4.5 G: 4; .5 INJECTION, POWDER, LYOPHILIZED, FOR SOLUTION INTRAVENOUS at 14:33

## 2021-12-15 RX ADMIN — BUPROPION HYDROCHLORIDE 150 MG: 150 TABLET, EXTENDED RELEASE ORAL at 08:44

## 2021-12-15 RX ADMIN — PIPERACILLIN SODIUM AND TAZOBACTAM SODIUM 4.5 G: 4; .5 INJECTION, POWDER, LYOPHILIZED, FOR SOLUTION INTRAVENOUS at 22:44

## 2021-12-15 RX ADMIN — SODIUM CHLORIDE, PRESERVATIVE FREE 10 ML: 5 INJECTION INTRAVENOUS at 21:22

## 2021-12-15 RX ADMIN — CITALOPRAM HYDROBROMIDE 10 MG: 20 TABLET ORAL at 21:22

## 2021-12-15 RX ADMIN — METOPROLOL SUCCINATE 25 MG: 25 TABLET, EXTENDED RELEASE ORAL at 08:43

## 2021-12-15 RX ADMIN — SODIUM CHLORIDE, PRESERVATIVE FREE 10 ML: 5 INJECTION INTRAVENOUS at 21:49

## 2021-12-15 RX ADMIN — FAMOTIDINE 40 MG: 20 TABLET ORAL at 08:44

## 2021-12-15 RX ADMIN — MORPHINE SULFATE 2 MG: 2 INJECTION, SOLUTION INTRAMUSCULAR; INTRAVENOUS at 08:50

## 2021-12-15 RX ADMIN — HYDROCODONE BITARTRATE AND ACETAMINOPHEN 1 TABLET: 10; 325 TABLET ORAL at 21:21

## 2021-12-15 RX ADMIN — APIXABAN 5 MG: 5 TABLET, FILM COATED ORAL at 08:44

## 2021-12-15 RX ADMIN — MORPHINE SULFATE 2 MG: 2 INJECTION, SOLUTION INTRAMUSCULAR; INTRAVENOUS at 19:39

## 2021-12-15 RX ADMIN — MORPHINE SULFATE 2 MG: 2 INJECTION, SOLUTION INTRAMUSCULAR; INTRAVENOUS at 02:30

## 2021-12-15 NOTE — PROGRESS NOTES
[facility]     Progress Note    Patient Name: Jose Shaikh  : 1960  MRN: [id number]  Primary Care Physician:  Corazon Gr MD  Date of admission: 2021      Subjective   Brief summary.  Patient admitted with cellulitis of the foot status post amputation      HPI:  Follow-up on cellulitis of the foot, patient somewhat anxious, seen by podiatrist earlier and vascular surgeon, for not healing that good.  Currently on wound VAC.    Review of Systems     No fever chills.  No shortness of breath.  Pain tolerable.  No nausea/ Vomitting  Abdominal pain.      Objective     Vitals:   Temp:  [97.88 °F (36.6 °C)-99.2 °F (37.3 °C)] 98.6 °F (37 °C)  Heart Rate:  [54-72] 60  Resp:  [18-20] 18  BP: ()/(51-76) 105/53    Physical Exam :     Morbidly obese male not in acute distress.  Heart regular.  Lungs clear.  Abdomen obese and soft.  Non tender  Extremity with no significant edema, right foot with wound VAC      Result Review:  I have personally reviewed the results from the time of this admission to 12/15/2021 13:59 EST and agree with these findings:  [x]  Laboratory  []  Microbiology  []  Radiology  []  EKG/Telemetry   []  Cardiology/Vascular   []  Pathology  []  Old records  []  Other:           Assessment / Plan       Active Hospital Problems:  Active Hospital Problems    Diagnosis    • Cellulitis and abscess of foot    • Lactic acidosis    • Essential hypertension    • Diabetic ulcer of right midfoot associated with type 2 DM    • Diabetic ulcer of left heel associated with type 2 DM        Plan:   Stable.  Continue ABX.  Continue wound care, monitor labs  Appreciate surgical input.         DVT prophylaxis:  Medical DVT prophylaxis orders are present.    CODE STATUS:   Code Status (Patient has no pulse and is not breathing): CPR (Attempt to Resuscitate)  Medical Interventions (Patient has pulse or is breathing): Full Support            Electronically signed by Yung Grijalva MD, 12/15/21, 1:59 PM  EST.

## 2021-12-15 NOTE — PROGRESS NOTES
[facility] - PODIATRY    Today's Date: 12/15/21    Patient Name: Jose Shaikh  MRN: [id number]  CSN: [id number]  PCP: Corazon Gr MD  Referring Provider: Yung Grijalva MD  Attending Provider: Yung Grijalva MD  Length of Stay: 14    SUBJECTIVE   Chief Complaint: Right foot osteomyelitis    HPI: Jose Shaikh, a 61 y.o.male,     Procedure: I&D of right midfoot  Date: 10 December 2021    Procedure: Right midfoot amputation  Date: 2 December 2021    The patient states that he is starting to have pain in his anterior right calf area along the shin.    I was contacted last night by the patient's nurse at 17:30.  Dr. Lyeva contacted Dr. Grijalva about this and subsequently Dr. Fuller, vascular surgeon, was contacted and the nurse was notified at 18:34 last evening.  A formal Vascular Surgery consult was ordered this morning.    Patient denies any fevers, chills, nausea, vomiting, shortness of breathe, nor any other constitutional signs nor symptoms.      Past Medical History:   Diagnosis Date   • Absence of toe of right foot    • Acute osteomyelitis of left calcaneus  8/18/2021   • Anxiety and depression    • Arthritis    • Claustrophobia    • Corns and callus    • Diabetic ulcer of left heel associated with type 2 DM 8/18/2021   • Diabetic ulcer of left heel associated with type 2 DM 7/6/2021   • Diabetic ulcer of right midfoot associated with type 2 DM 8/18/2021   • Difficulty walking    • Essential hypertension 8/31/2021   • Hammertoe    • Hyperlipidemia LDL goal <100 8/31/2021   • Ingrown toenail    • Obesity    • Paroxysmal atrial fibrillation 8/31/2021   • Polyneuropathy    • Pressure ulcer, stage 1    • Tinea unguium    • Type 2 diabetes mellitus with polyneuropathy      Past Surgical History:   Procedure Laterality Date   • CYST REMOVAL      center of back; benign   • INCISION AND DRAINAGE ABSCESS      back   • INCISION AND DRAINAGE LEG Right 12/10/2021    Procedure: INCISION AND DRAINAGE LOWER  EXTREMITY;  Surgeon: Ash Leyva DPM;  Location: [facility] MAIN OR;  Service: Podiatry;  Laterality: Right;   • OTHER SURGICAL HISTORY      Surgical clips left foot   • TOE SURGERY Right     Removal of 5th toe   • TRANS METATARSAL AMPUTATION Right 2021    Procedure: AMPUTATION TRANS METATARSAL;  Surgeon: Ash Leyva DPM;  Location: [facility] MAIN OR;  Service: Podiatry;  Laterality: Right;   • WRIST SURGERY Left     repair of injury     Family History   Problem Relation Age of Onset   • Heart disease Mother    • Heart disease Father    • Cancer Father         Unspecified   • Heart disease Brother      Social History     Socioeconomic History   • Marital status: Single   Tobacco Use   • Smoking status: Former Smoker     Packs/day: 0.00     Years: 1.00     Pack years: 0.00     Quit date: 1991     Years since quittin.3   • Smokeless tobacco: Never Used   • Tobacco comment: quit at age 30   Vaping Use   • Vaping Use: Never used   Substance and Sexual Activity   • Alcohol use: Yes     Comment: Rare   • Drug use: Yes     Types: Marijuana     Comment: Daily   • Sexual activity: Defer     Allergies   Allergen Reactions   • Adhesive Tape Rash     Current Facility-Administered Medications   Medication Dose Route Frequency Provider Last Rate Last Admin   • acetaminophen (TYLENOL) suppository 650 mg  650 mg Rectal Q4H PRN Ash Leyva DPM       • acetaminophen (TYLENOL) tablet 650 mg  650 mg Oral Q4H PRN Ash Leyva DPM   650 mg at 21   • aluminum-magnesium hydroxide-simethicone (MAALOX MAX) 400-400-40 MG/5ML suspension 15 mL  15 mL Oral Q6H PRN Ash Leyva DPM       • apixaban (ELIQUIS) tablet 5 mg  5 mg Oral Q12H Ash Leyva DPM   5 mg at 21   • atorvastatin (LIPITOR) tablet 10 mg  10 mg Oral Nightly Ash Leyva DPM   10 mg at 21   • sennosides-docusate (PERICOLACE) 8.6-50 MG per tablet 1 tablet  1  tablet Oral BID Ash Leyva DPM   1 tablet at 12/12/21 2031    And   • polyethylene glycol (MIRALAX) packet 17 g  17 g Oral Daily PRN Ash Leyva DPM        And   • bisacodyl (DULCOLAX) EC tablet 5 mg  5 mg Oral Daily PRN Ash Leyva DPM        And   • bisacodyl (DULCOLAX) suppository 10 mg  10 mg Rectal Daily PRN Ash Leyva DPM       • buPROPion XL (WELLBUTRIN XL) 24 hr tablet 150 mg  150 mg Oral BID Ash Leyva DPM   150 mg at 12/14/21 2013   • citalopram (CeleXA) tablet 10 mg  10 mg Oral Daily Ash Leyva DPM   10 mg at 12/14/21 2013   • dextrose (GLUTOSE) oral gel 15 g  15 g Oral Q15 Min PRN Ash Leyva DPM       • dextrose 10 % infusion  25 g Intravenous Q15 Min PRN Ash Leyva DPM       • diphenhydrAMINE (BENADRYL) capsule 25 mg  25 mg Oral Q6H PRN Yung Grijalva MD   25 mg at 12/14/21 1614   • famotidine (PEPCID) tablet 40 mg  40 mg Oral Daily Ash Leyva DPM   40 mg at 12/14/21 0919   • glucagon (human recombinant) (GLUCAGEN DIAGNOSTIC) injection 1 mg  1 mg Subcutaneous Q15 Min PRN Ash Leyva DPM       • HYDROcodone-acetaminophen (NORCO)  MG per tablet 1 tablet  1 tablet Oral Q4H PRN Ash Leyva DPM   1 tablet at 12/15/21 0230   • HYDROcodone-acetaminophen (NORCO) 5-325 MG per tablet 1 tablet  1 tablet Oral Q4H PRN Ash Leyva DPM   1 tablet at 12/13/21 0533   • insulin detemir (LEVEMIR) injection 25 Units  25 Units Subcutaneous Nightly Ash Leyva DPM   25 Units at 12/14/21 2032   • insulin lispro (humaLOG) injection 0-9 Units  0-9 Units Subcutaneous 4x Daily With Meals & Nightly Ash Leyva DPM   4 Units at 12/14/21 2033   • lactated ringers infusion  9 mL/hr Intravenous Continuous PRN Ash Leyva DPM       • lisinopril (PRINIVIL,ZESTRIL) tablet 20 mg  20 mg Oral Daily Ash Leyva DPM   20 mg at 12/14/21 0920    • loperamide (IMODIUM) capsule 2 mg  2 mg Oral Q4H PRN Ash Leyva DPM       • metoprolol succinate XL (TOPROL-XL) 24 hr tablet 25 mg  25 mg Oral Q24H Ash Leyva DPM   25 mg at 12/14/21 0919   • morphine injection 2 mg  2 mg Intravenous Q2H PRN Ash Leyva DPM   2 mg at 12/15/21 0230   • naloxone (NARCAN) injection 0.4 mg  0.4 mg Intravenous Q5 Min PRN Ash Leyva DPM       • ondansetron (ZOFRAN) tablet 4 mg  4 mg Oral Q6H PRN Ash Leyva DPM        Or   • ondansetron (ZOFRAN) injection 4 mg  4 mg Intravenous Q6H PRN Ash Leyva DPM       • piperacillin-tazobactam (ZOSYN) 4.5 g/100 mL 0.9% NS IVPB (mbp)  4.5 g Intravenous Q8H Yung Grijalva MD 0 mL/hr at 12/13/21 1750 4.5 g at 12/15/21 0552   • pregabalin (LYRICA) capsule 25 mg  25 mg Oral Q12H Ash Leyva DPM   25 mg at 12/14/21 2013   • sodium chloride 0.9 % flush 10 mL  10 mL Intravenous Q12H Ash Leyva DPM   10 mL at 12/14/21 2026   • sodium chloride 0.9 % flush 10 mL  10 mL Intravenous PRN Ash Leyva DPM       • tamsulosin (FLOMAX) 24 hr capsule 0.4 mg  0.4 mg Oral Nightly Ahs Leyva DPM   0.4 mg at 12/14/21 2014     Review of Systems   Constitutional: Negative.    Skin:        Bilateral foot ulcers.  Reporting increasing pain in his anterior right calf.   All other systems reviewed and are negative.      OBJECTIVE     Vitals:    12/15/21 0714   BP: 90/51   Pulse: 58   Resp: 18   Temp: 97.9 °F (36.6 °C)   SpO2: 96%       PHYSICAL EXAM    GEN:   A&Ox3, NAD. Pt presents in hospital bed.     Neurovascular status unchanged    Ortho: Right midfoot amputation    Right foot: VeroFlo VAC dressing intact with no leaks.  Blood-tinged serous drainage present in collection tube and container.  Erythema and tenderness to palpation is seen medially proximal to the VAC dressing.  The patient is tender along the midpoint of the anterior right leg along  the shin.  There is small amount of fluctuance.  Local edema with no erythema at this level.    Left plantar heel shows stage III ulceration with granular tissue present.  No signs of edema, erythema, lymphangitis, nor signs of infection.      LABORATORY/CULTURE RESULTS:  Results from last 7 days   Lab Units 12/14/21  0513 12/10/21  0626   WBC 10*3/mm3 6.37 8.17   HEMOGLOBIN g/dL 11.7* 11.4*   HEMATOCRIT % 35.8* 34.4*   PLATELETS 10*3/mm3 141 129*     Results from last 7 days   Lab Units 12/14/21  0513 12/10/21  0626   SODIUM mmol/L 135* 134*   POTASSIUM mmol/L 4.7 4.3   CHLORIDE mmol/L 101 102   CO2 mmol/L 29.1* 24.1   BUN mg/dL 14 9   CREATININE mg/dL 0.91 0.81   CALCIUM mg/dL 8.9 8.3*   BILIRUBIN mg/dL  --  0.8   ALK PHOS U/L  --  327*   ALT (SGPT) U/L  --  50*   AST (SGOT) U/L  --  62*   GLUCOSE mg/dL 155* 149*         Microbiology Results (last 10 days)     Procedure Component Value - Date/Time    Anaerobic Culture - Wound, Foot, Right [[id number]]  (Normal) Collected: 12/10/21 1835    Lab Status: Final result Specimen: Wound from Foot, Right Updated: 12/15/21 0711     Anaerobic Culture No anaerobes isolated at 5 days    Wound Culture - Wound, Foot, Right [[id number]]  (Abnormal) Collected: 12/10/21 1835    Lab Status: Final result Specimen: Wound from Foot, Right Updated: 12/12/21 0950     Wound Culture Moderate growth (3+) Corynebacterium species     Comment: No definitive guidelines. All are susceptible to vancomycin. Resistance to penicillins & cephalosporins does occur.        Gram Stain Moderate (3+) WBCs seen      Rare (1+) Gram negative bacilli, tiny           ASSESSMENT/PLAN     Patient Active Problem List   Diagnosis   • Diabetic ulcer of left heel associated with type 2 DM   • Acute osteomyelitis of left calcaneus    • Diabetic ulcer of left heel associated with type 2 DM   • Diabetic ulcer of right midfoot associated with type 2 DM   • Paroxysmal atrial fibrillation   • Essential hypertension   •  Hyperlipidemia LDL goal <100   • Cellulitis and abscess of foot   • Lactic acidosis       Comprehensive lower extremity examination and evaluation was performed.    Continue current wound care orders.      Vascular surgery is scheduled to reevaluate him today.    Plan was discussed with the patient's nurse.    Discussed with patient's nurse.      Lab Frequency Next Occurrence   Lipid Panel Once 02/27/2022   WOUND CARE HYPERBARIC weekdays        This document has been electronically signed by Ash Leyva DPM on December 15, 2021 07:28 EST

## 2021-12-15 NOTE — SIGNIFICANT NOTE
12/15/21 1015   Wound 06/22/21 1133 Left lateral heel   Placement Date/Time: 06/22/21 1133   Present on Hospital Admission: Yes  Side: Left  Orientation: lateral  Location: heel   Dressing Appearance intact; dry   Wound 12/02/21 Right anterior foot Incision   Placement Date: 12/02/21   Side: Right  Orientation: anterior  Location: foot  Primary Wound Type: Incision   Dressing Appearance intact   Base dressing in place, unable to visualize   Periwound edematous; redness; ecchymotic   Periwound Temperature warm   Drainage Characteristics/Odor serosanguineous   Care, Wound negative pressure wound therapy   NPWT (Negative Pressure Wound Therapy) 12/13/21 1200 Right anterior foot   Placement Date/Time: 12/13/21 1200   Location: Right anterior foot   Therapy Setting continuous therapy   Dressing foam, silver   Instillation normal saline   Pressure Setting 125 mmHg   Wound check / Follow-up / VAC. Patient currently receiving VAC Veraflo treatments. After reviewing photo from yesterdays dressing change, response to Veraflo noted, opting to continue treatment until tomorrow at which time will change dressing and continue to evaluate Vac therapy for effectiveness. Removed gauze dressing from around Vac dressing. Dressing intact with good seal. Able to visual Eakins barrier and Polymem Silver foam barrier along with compressed foam. Patient has serosanguinous drainage to cannister. Erythema and redness to anterior aspect of foot somewhat improved from prior assessment. Patient has edema to Bilateral lower legs with indentions noted from pillows. Fluid noted to right anterior lower leg. Pillow was pressing lateral aspect of right lower leg with indention noted laterally. Chronic discoloration noted to anterior aspects of bilateral lower legs. Vascular to evaluate from vascular standpoint. Will change dressing or remove vac for assessment if needed. Replaced gauze roll around dressing and elevated and repositioned leg.    Bettye Pope RN

## 2021-12-15 NOTE — PLAN OF CARE
Goal Outcome Evaluation:            Patient has had minimal complaints of pain today. Still awaiting placement.

## 2021-12-15 NOTE — PLAN OF CARE
Goal Outcome Evaluation:      Patient is on a regular/carb consistent diet and consuming 100% of meals.  No further nutrition intervention indicated at this time.  RD will continue to monitor as needed per protocol.

## 2021-12-15 NOTE — CONSULTS
[facility] Vascular Surgery In-Patient Consult    Name:  Jose Shaikh  MRN Number:  [id number]  Date of Admission:  12/1/2021  Date of Consultation: December 15, 2021.    Consulting Provider:    PCP: Corazon Gr MD  IP Care Team:  Patient Care Team:  Corazon Gr MD as PCP - General (Internal Medicine)  Kami Garcia APRN as Nurse Practitioner (Endocrinology)    Reason for Consultation: Right foot and leg    History of Present Illness:      Jose Shaikh is a 61 y.o. male with a history of right midfoot amputation on December 2 and I&D on December 10, 2021 performed by Dr. Leyva.  He was a patient at the wound care center for a nonhealing wound on the right fourth toe and heel.  He has also been receiving hyperbaric treatment treatment under the care of Dr. Canales at the wound care clinic.  He denies any fever, chills, nausea or vomiting.  He has a veroFlo wound VAC placed on the right foot that is intact.     Review of Systems:  ROS    Past Medical History:    Past Medical History:   Diagnosis Date   • Absence of toe of right foot    • Acute osteomyelitis of left calcaneus  8/18/2021   • Anxiety and depression    • Arthritis    • Claustrophobia    • Corns and callus    • Diabetic ulcer of left heel associated with type 2 DM 8/18/2021   • Diabetic ulcer of left heel associated with type 2 DM 7/6/2021   • Diabetic ulcer of right midfoot associated with type 2 DM 8/18/2021   • Difficulty walking    • Essential hypertension 8/31/2021   • Hammertoe    • Hyperlipidemia LDL goal <100 8/31/2021   • Ingrown toenail    • Obesity    • Paroxysmal atrial fibrillation 8/31/2021   • Polyneuropathy    • Pressure ulcer, stage 1    • Tinea unguium    • Type 2 diabetes mellitus with polyneuropathy        Past Surgical History:    Past Surgical History:   Procedure Laterality Date   • CYST REMOVAL      center of back; benign   • INCISION AND DRAINAGE ABSCESS      back   • INCISION AND DRAINAGE LEG  Right 12/10/2021    Procedure: INCISION AND DRAINAGE LOWER EXTREMITY;  Surgeon: Ash Leyva DPM;  Location: [facility] MAIN OR;  Service: Podiatry;  Laterality: Right;   • OTHER SURGICAL HISTORY      Surgical clips left foot   • TOE SURGERY Right     Removal of 5th toe   • TRANS METATARSAL AMPUTATION Right 2021    Procedure: AMPUTATION TRANS METATARSAL;  Surgeon: Ash Leyva DPM;  Location: [facility] MAIN OR;  Service: Podiatry;  Laterality: Right;   • WRIST SURGERY Left     repair of injury       Family History:    Family History   Problem Relation Age of Onset   • Heart disease Mother    • Heart disease Father    • Cancer Father         Unspecified   • Heart disease Brother        Social History:    Social History     Socioeconomic History   • Marital status: Single   Tobacco Use   • Smoking status: Former Smoker     Packs/day: 0.00     Years: 1.00     Pack years: 0.00     Quit date: 1991     Years since quittin.3   • Smokeless tobacco: Never Used   • Tobacco comment: quit at age 30   Vaping Use   • Vaping Use: Never used   Substance and Sexual Activity   • Alcohol use: Yes     Comment: Rare   • Drug use: Yes     Types: Marijuana     Comment: Daily   • Sexual activity: Defer       Allergies:  Allergies   Allergen Reactions   • Adhesive Tape Rash       Medications:      Current Facility-Administered Medications:   •  [DISCONTINUED] acetaminophen (TYLENOL) tablet 650 mg, 650 mg, Oral, Q4H PRN, 650 mg at 21 0842 **OR** acetaminophen (TYLENOL) suppository 650 mg, 650 mg, Rectal, Q4H PRN, Ash Leyva DPM  •  acetaminophen (TYLENOL) tablet 650 mg, 650 mg, Oral, Q4H PRN, Ash Leyva DPM, 650 mg at 21 2201  •  aluminum-magnesium hydroxide-simethicone (MAALOX MAX) 400-400-40 MG/5ML suspension 15 mL, 15 mL, Oral, Q6H PRN, Ash Leyva DPM  •  apixaban (ELIQUIS) tablet 5 mg, 5 mg, Oral, Q12H, Ash Leyva DPM, 5 mg at 12/15/21 0844  •   atorvastatin (LIPITOR) tablet 10 mg, 10 mg, Oral, Nightly, Ash Leyva DPM, 10 mg at 12/14/21 2013  •  sennosides-docusate (PERICOLACE) 8.6-50 MG per tablet 1 tablet, 1 tablet, Oral, BID, 1 tablet at 12/12/21 2031 **AND** polyethylene glycol (MIRALAX) packet 17 g, 17 g, Oral, Daily PRN **AND** bisacodyl (DULCOLAX) EC tablet 5 mg, 5 mg, Oral, Daily PRN **AND** bisacodyl (DULCOLAX) suppository 10 mg, 10 mg, Rectal, Daily PRN, Ash Leyva DPM  •  buPROPion XL (WELLBUTRIN XL) 24 hr tablet 150 mg, 150 mg, Oral, BID, Ash Leyva DPM, 150 mg at 12/15/21 0844  •  citalopram (CeleXA) tablet 10 mg, 10 mg, Oral, Daily, Ash Leyva DPM, 10 mg at 12/14/21 2013  •  dextrose (GLUTOSE) oral gel 15 g, 15 g, Oral, Q15 Min PRN, Ash Leyva DPM  •  dextrose 10 % infusion, 25 g, Intravenous, Q15 Min PRN, Ash Leyva DPM  •  diphenhydrAMINE (BENADRYL) capsule 25 mg, 25 mg, Oral, Q6H PRN, Yung Girjalva MD, 25 mg at 12/14/21 1614  •  famotidine (PEPCID) tablet 40 mg, 40 mg, Oral, Daily, Ash Leyva DPM, 40 mg at 12/15/21 0844  •  glucagon (human recombinant) (GLUCAGEN DIAGNOSTIC) injection 1 mg, 1 mg, Subcutaneous, Q15 Min PRN, Ash Leyva DPM  •  HYDROcodone-acetaminophen (NORCO)  MG per tablet 1 tablet, 1 tablet, Oral, Q4H PRN, Ash Leyva DPM, 1 tablet at 12/15/21 0955  •  HYDROcodone-acetaminophen (NORCO) 5-325 MG per tablet 1 tablet, 1 tablet, Oral, Q4H PRN, Ash Leyva DPM, 1 tablet at 12/13/21 0533  •  insulin detemir (LEVEMIR) injection 25 Units, 25 Units, Subcutaneous, Nightly, Ash Leyva DPM, 25 Units at 12/14/21 2032  •  insulin lispro (humaLOG) injection 0-9 Units, 0-9 Units, Subcutaneous, 4x Daily With Meals & Nightly, Ash Leyva DPM, 6 Units at 12/15/21 1139  •  lactated ringers infusion, 9 mL/hr, Intravenous, Continuous PRN, Ash Leyva DPM  •  lisinopril  (PRINIVIL,ZESTRIL) tablet 20 mg, 20 mg, Oral, Daily, Ash Leyva DPM, 20 mg at 12/15/21 0844  •  loperamide (IMODIUM) capsule 2 mg, 2 mg, Oral, Q4H PRN, Ash Leyva DPM  •  metoprolol succinate XL (TOPROL-XL) 24 hr tablet 25 mg, 25 mg, Oral, Q24H, Ash Leyva DPM, 25 mg at 12/15/21 0843  •  morphine injection 2 mg, 2 mg, Intravenous, Q2H PRN, Ash Leyva DPM, 2 mg at 12/15/21 0850  •  [DISCONTINUED] morphine injection 2 mg, 2 mg, Intravenous, Q2H PRN **AND** naloxone (NARCAN) injection 0.4 mg, 0.4 mg, Intravenous, Q5 Min PRN, Ash Leyva DPM  •  ondansetron (ZOFRAN) tablet 4 mg, 4 mg, Oral, Q6H PRN **OR** ondansetron (ZOFRAN) injection 4 mg, 4 mg, Intravenous, Q6H PRN, Ash Leyva DPM  •  Pharmacy to Dose Zosyn, , Does not apply, Continuous PRN, Yung Grijalva MD  •  piperacillin-tazobactam (ZOSYN) 4.5 g/100 mL 0.9% NS IVPB (mbp), 4.5 g, Intravenous, Q8H, Yung Grijalva MD, Last Rate: 0 mL/hr at 12/13/21 1750, 4.5 g at 12/15/21 0552  •  pregabalin (LYRICA) capsule 25 mg, 25 mg, Oral, Q12H, Ash Leyva DPM, 25 mg at 12/15/21 0844  •  sodium chloride 0.9 % flush 10 mL, 10 mL, Intravenous, Q12H, Ash Leyva DPM, 10 mL at 12/14/21 2026  •  sodium chloride 0.9 % flush 10 mL, 10 mL, Intravenous, PRN, Ash Leyva DPM  •  tamsulosin (FLOMAX) 24 hr capsule 0.4 mg, 0.4 mg, Oral, Nightly, Ash Leyva DPM, 0.4 mg at 12/14/21 2014    Physical Exam:  Vital Signs:    Vitals:    12/15/21 0338 12/15/21 0714 12/15/21 1000 12/15/21 1203   BP: 111/69 90/51  105/53   BP Location: Right arm Right arm  Left arm   Patient Position: Lying Lying  Lying   Pulse: 64 58  60   Resp:  18  18   Temp: 98.6 °F (37 °C) 97.9 °F (36.6 °C)  98.6 °F (37 °C)   TempSrc: Oral Oral  Oral   SpO2: 95% 96% 96% 97%   Weight:       Height:         Body mass index is 46.79 kg/m².     Physical Exam    Constitutional: Alert and oriented.  Neck:  Supple  Cardiovascular: Regular rate and rhythm.  Extremities: Right foot: VeroFlo VAC dressing intact no leaks.  Mild erythema medial to proximal.  Mild tenderness anterior mid shin with mild fluctuance, edema with no erythema.  Audible dorsalis pedis pulse.    Labs/Imaging/Procedures:   Results from last 7 days   Lab Units 12/14/21  0513 12/10/21  0626 12/10/21  0626   SODIUM mmol/L 135*   < > 134*   POTASSIUM mmol/L 4.7   < > 4.3   CHLORIDE mmol/L 101   < > 102   CO2 mmol/L 29.1*   < > 24.1   BUN mg/dL 14   < > 9   CREATININE mg/dL 0.91   < > 0.81   CALCIUM mg/dL 8.9   < > 8.3*   BILIRUBIN mg/dL  --   --  0.8   ALK PHOS U/L  --   --  327*   ALT (SGPT) U/L  --   --  50*   AST (SGOT) U/L  --   --  62*   GLUCOSE mg/dL 155*   < > 149*    < > = values in this interval not displayed.         Results from last 7 days   Lab Units 12/14/21  0513 12/10/21  0626   WBC 10*3/mm3 6.37 8.17   HEMOGLOBIN g/dL 11.7* 11.4*   HEMATOCRIT % 35.8* 34.4*   PLATELETS 10*3/mm3 141 129*                 No radiology results for the last 30 days.   LHC: No results found for this or any previous visit.     Echo:    Carotid Duplex: Results for orders placed during the hospital encounter of 12/01/21    Doppler Ankle Brachial Index Single Level CAR    Interpretation Summary  · Left Conclusion: The left RIZWANA is normal. Waveforms are consistent with tib-peroneal disease. Normal digital pressures.  · Right Conclusion: The right RIZWANA is normal. Normal digital pressures.      Assessment / Plan:    Diabetic ulcer of left heel associated with type 2 DM    Diabetic ulcer of right midfoot associated with type 2 DM    Essential hypertension    Cellulitis and abscess of foot    Lactic acidosis    Mr Shaikh has a veroFlo wound VAC in place on the right foot with mild erythema on the medial aspect of the periwound.  He has hyperpigmentation and some mild tenderness with mild fluctuance and edema on the right anterior lower extremity.  He has audible dorsalis  pedis and posterior tibial pulses.  I have reviewed the wound photos and it appears he has made some satisfactory progress with the VeroFlo wound vac. I don't think there is an immediate threat that would require a vascular intervention at this time but agree it should be watched and followed closely. We will continue to monitor.             VALENTÍN Dockery  [facility] Vascular Surgery  14:12 EST  12/15/21     Thank you for allowing us to participate in the care of your patient. Please do not hesitate to contact us with additional questions or concerns.     The patient was reviewed and examined with VALENTÍN Dockery.  I have personally examined the patient and reviewed the data and edited the note as appropriate.

## 2021-12-15 NOTE — THERAPY TREATMENT NOTE
Acute Care - Physical Therapy Treatment Note   [facility]     Patient Name: Jose Sahikh  : 1960  MRN: [id number]  Today's Date: 12/15/2021      Visit Dx:     ICD-10-CM ICD-9-CM   1. Diabetic ulcer of right midfoot associated with type 2 DM  E11.621 250.80    L97.415 707.14   2. Diabetic ulcer of right midfoot associated with type 2 diabetes mellitus, with muscle involvement without evidence of necrosis (HCC)  E11.621 250.80    L97.415 707.14   3. Difficulty walking  R26.2 719.7   4. Cellulitis and abscess of foot  L03.119 682.7    L02.619      Patient Active Problem List   Diagnosis   • Diabetic ulcer of left heel associated with type 2 DM   • Acute osteomyelitis of left calcaneus    • Diabetic ulcer of left heel associated with type 2 DM   • Diabetic ulcer of right midfoot associated with type 2 DM   • Paroxysmal atrial fibrillation   • Essential hypertension   • Hyperlipidemia LDL goal <100   • Cellulitis and abscess of foot   • Lactic acidosis     Past Medical History:   Diagnosis Date   • Absence of toe of right foot    • Acute osteomyelitis of left calcaneus  2021   • Anxiety and depression    • Arthritis    • Claustrophobia    • Corns and callus    • Diabetic ulcer of left heel associated with type 2 DM 2021   • Diabetic ulcer of left heel associated with type 2 DM 2021   • Diabetic ulcer of right midfoot associated with type 2 DM 2021   • Difficulty walking    • Essential hypertension 2021   • Hammertoe    • Hyperlipidemia LDL goal <100 2021   • Ingrown toenail    • Obesity    • Paroxysmal atrial fibrillation 2021   • Polyneuropathy    • Pressure ulcer, stage 1    • Tinea unguium    • Type 2 diabetes mellitus with polyneuropathy      Past Surgical History:   Procedure Laterality Date   • CYST REMOVAL      center of back; benign   • INCISION AND DRAINAGE ABSCESS      back   • INCISION AND DRAINAGE LEG Right 12/10/2021    Procedure: INCISION AND DRAINAGE LOWER  EXTREMITY;  Surgeon: Ash Leyva DPM;  Location: [facility] MAIN OR;  Service: Podiatry;  Laterality: Right;   • OTHER SURGICAL HISTORY      Surgical clips left foot   • TOE SURGERY Right     Removal of 5th toe   • TRANS METATARSAL AMPUTATION Right 12/2/2021    Procedure: AMPUTATION TRANS METATARSAL;  Surgeon: Ash Leyva DPM;  Location: [facility] MAIN OR;  Service: Podiatry;  Laterality: Right;   • WRIST SURGERY Left     repair of injury     PT Assessment (last 12 hours)     PT Evaluation and Treatment     Row Name 12/15/21 1000          Physical Therapy Time and Intention    Subjective Information no complaints  -DP     Document Type therapy note (daily note)  -DP     Mode of Treatment individual therapy; physical therapy  -DP     Patient Effort fair  -DP     Row Name 12/15/21 1000          Bed Mobility    Bed Mobility supine-sit-supine  -DP     Supine-Sit-Supine Ronda (Bed Mobility) moderate assist (50% patient effort)  -DP     Row Name 12/15/21 1000          Transfers    Transfers sit-stand transfer  -DP     Comment (Transfers) deferred  -DP     Row Name             Wound 06/22/21 1133 Left lateral heel    Wound - Properties Group Placement Date: 06/22/21  -FABIOLA Placement Time: 1133  -FABIOLA Present on Hospital Admission: Y  -FABIOLA Side: Left  -FABIOLA Orientation: lateral  -FABIOLA Location: heel  -FABIOLA     Retired Wound - Properties Group Date first assessed: 06/22/21  -FABIOLA Time first assessed: 1133  -FABIOLA Present on Hospital Admission: Y  -FABIOLA Side: Left  -FABIOLA Location: heel  -FABIOLA     Row Name             Wound 12/02/21 Right anterior foot Incision    Wound - Properties Group Placement Date: 12/02/21  -ES Side: Right  -ES Orientation: anterior  -ES Location: foot  -ES Primary Wound Type: Incision  -ES     Retired Wound - Properties Group Date first assessed: 12/02/21  -ES Side: Right  -ES Location: foot  -ES Primary Wound Type: Incision  -ES     Row Name             NPWT (Negative Pressure Wound Therapy)  12/13/21 1200 Right anterior foot    NPWT (Negative Pressure Wound Therapy) - Properties Group Placement Date: 12/13/21  -RASHARD Placement Time: 1200  -RASHARD Location: Right anterior foot  -RASHARD     Retired NPWT (Negative Pressure Wound Therapy) - Properties Group Placement Date: 12/13/21  -RASHARD Placement Time: 1200  -RASHARD Location: Right anterior foot  -RASHARD           User Key  (r) = Recorded By, (t) = Taken By, (c) = Cosigned By    Initials Name Provider Type    Karon Jordan, RN Registered Nurse    Marlen Vaughn RN Registered Nurse    Deedee Dove, PT Physical Therapist    Katlyn Garcia, RN Registered Nurse              Patient participated in sitting exercises on edge of bed: hip knee flexion, LAQ, hip abduction x 10 reps  Physical Therapy Education                 Title: PT OT SLP Therapies (Done)     Topic: Physical Therapy (Done)     Point: Mobility training (Done)     Learning Progress Summary           Patient Acceptance, E, VU,NR by  at 12/14/2021 1605      Show all documentation for this point (4)                 Point: Home exercise program (Done)     Learning Progress Summary           Patient Acceptance, E,TB, VU by  at 12/14/2021 1529      Show all documentation for this point (3)                 Point: Body mechanics (Done)     Learning Progress Summary           Patient Acceptance, E,TB, VU by  at 12/14/2021 1529      Show all documentation for this point (3)                 Point: Precautions (Done)     Learning Progress Summary           Patient Acceptance, E, VU,NR by  at 12/14/2021 1605      Show all documentation for this point (4)                             User Key     Initials Effective Dates Name Provider Type Discipline     04/25/21 -  Kim Esparza, PT Physical Therapist PT    AW 06/21/21 -  Katy Esparza RN Registered Nurse Nurse              PT Recommendation and Plan  Anticipated Discharge Disposition (PT): sub acute care setting  Planned Therapy Interventions  (PT): balance training, bed mobility training, gait training, neuromuscular re-education, ROM (range of motion), stair training, strengthening, transfer training  Therapy Frequency (PT): daily  Plan of Care Reviewed With: patient  Outcome Summary: Pt presents with deficits in strength, balance, and mobility. Pt would benefit from skilled physical therapy to address the mentioned impairments and allow the pt to attain optimal functional mobility.   Outcome Measures     Row Name 12/15/21 1000 12/14/21 1500 12/12/21 1200       How much help from another person do you currently need...    Turning from your back to your side while in flat bed without using bedrails? 3  -DP 3  -CS 3  -CSA    Moving from lying on back to sitting on the side of a flat bed without bedrails? 2  -DP 3  -CS 3  -CSA    Moving to and from a bed to a chair (including a wheelchair)? 2  -DP 2  -CS 2  -CSA    Standing up from a chair using your arms (e.g., wheelchair, bedside chair)? 2  -DP 2  -CS 2  -CSA    Climbing 3-5 steps with a railing? 1  -DP 1  -CS 1  -CSA    To walk in hospital room? 1  -DP 1  -CS 1  -CSA    AM-PAC 6 Clicks Score (PT) 11  -DP 12  -CS 12  -CSA       Functional Assessment    Outcome Measure Options AM-PAC 6 Clicks Basic Mobility (PT)  -DP AM-PAC 6 Clicks Basic Mobility (PT)  -CS AM-PAC 6 Clicks Basic Mobility (PT)  -CSA          User Key  (r) = Recorded By, (t) = Taken By, (c) = Cosigned By    Initials Name Provider Type    Deedee Dove, PT Physical Therapist    Kim Avila, PT Physical Therapist    Ronald Fisher, CURTIS Physical Therapy Assistant                 Time Calculation:    PT Charges     Row Name 12/15/21 1035             Time Calculation    PT Received On 12/15/21  -DP      PT Goal Re-Cert Due Date 12/23/21  -DP              Timed Charges    94627 - PT Therapeutic Exercise Minutes 12  -DP      11955 - PT Therapeutic Activity Minutes 12  -DP              Total Minutes    Timed Charges Total Minutes  24  -DP       Total Minutes 24  -DP            User Key  (r) = Recorded By, (t) = Taken By, (c) = Cosigned By    Initials Name Provider Type    Deedee Dove, PT Physical Therapist              Therapy Charges for Today     Code Description Service Date Service Provider Modifiers Qty    [id number]  PT THER PROC EA 15 MIN 12/15/2021 Deedee Landers, PT GP 1    [id number]  PT THERAPEUTIC ACT EA 15 MIN 12/15/2021 Deedee Landers, PT GP 1          PT G-Codes  Outcome Measure Options: AM-PAC 6 Clicks Basic Mobility (PT)  AM-PAC 6 Clicks Score (PT): 11    Deedee Landers, PT  12/15/2021

## 2021-12-15 NOTE — PLAN OF CARE
Goal Outcome Evaluation:  Plan of Care Reviewed With: patient        Progress: improving  Outcome Summary: patient alert and oriented. prn pain meds given. wound vac without complications (complications)

## 2021-12-16 LAB
ALBUMIN SERPL-MCNC: 2.4 G/DL (ref 3.5–5.2)
ALBUMIN/GLOB SERPL: 0.6 G/DL
ALP SERPL-CCNC: 437 U/L (ref 39–117)
ALT SERPL W P-5'-P-CCNC: 36 U/L (ref 1–41)
ANION GAP SERPL CALCULATED.3IONS-SCNC: 8 MMOL/L (ref 5–15)
AST SERPL-CCNC: 47 U/L (ref 1–40)
BASOPHILS # BLD AUTO: 0.02 10*3/MM3 (ref 0–0.2)
BASOPHILS NFR BLD AUTO: 0.3 % (ref 0–1.5)
BILIRUB SERPL-MCNC: 0.6 MG/DL (ref 0–1.2)
BUN SERPL-MCNC: 12 MG/DL (ref 8–23)
BUN/CREAT SERPL: 15.6 (ref 7–25)
CALCIUM SPEC-SCNC: 8.4 MG/DL (ref 8.6–10.5)
CHLORIDE SERPL-SCNC: 99 MMOL/L (ref 98–107)
CO2 SERPL-SCNC: 27 MMOL/L (ref 22–29)
CREAT SERPL-MCNC: 0.77 MG/DL (ref 0.76–1.27)
D-LACTATE SERPL-SCNC: 0.7 MMOL/L (ref 0.5–2)
DEPRECATED RDW RBC AUTO: 43.4 FL (ref 37–54)
EOSINOPHIL # BLD AUTO: 0.13 10*3/MM3 (ref 0–0.4)
EOSINOPHIL NFR BLD AUTO: 2.2 % (ref 0.3–6.2)
ERYTHROCYTE [DISTWIDTH] IN BLOOD BY AUTOMATED COUNT: 13.9 % (ref 12.3–15.4)
GFR SERPL CREATININE-BSD FRML MDRD: 102 ML/MIN/1.73
GLOBULIN UR ELPH-MCNC: 3.7 GM/DL
GLUCOSE BLDC GLUCOMTR-MCNC: 168 MG/DL (ref 70–99)
GLUCOSE BLDC GLUCOMTR-MCNC: 185 MG/DL (ref 70–99)
GLUCOSE BLDC GLUCOMTR-MCNC: 190 MG/DL (ref 70–99)
GLUCOSE BLDC GLUCOMTR-MCNC: 238 MG/DL (ref 70–99)
GLUCOSE SERPL-MCNC: 175 MG/DL (ref 65–99)
HCT VFR BLD AUTO: 34.8 % (ref 37.5–51)
HGB BLD-MCNC: 11.2 G/DL (ref 13–17.7)
IMM GRANULOCYTES # BLD AUTO: 0.01 10*3/MM3 (ref 0–0.05)
IMM GRANULOCYTES NFR BLD AUTO: 0.2 % (ref 0–0.5)
LYMPHOCYTES # BLD AUTO: 0.91 10*3/MM3 (ref 0.7–3.1)
LYMPHOCYTES NFR BLD AUTO: 15.4 % (ref 19.6–45.3)
MCH RBC QN AUTO: 27.6 PG (ref 26.6–33)
MCHC RBC AUTO-ENTMCNC: 32.2 G/DL (ref 31.5–35.7)
MCV RBC AUTO: 85.7 FL (ref 79–97)
MONOCYTES # BLD AUTO: 0.58 10*3/MM3 (ref 0.1–0.9)
MONOCYTES NFR BLD AUTO: 9.8 % (ref 5–12)
NEUTROPHILS NFR BLD AUTO: 4.24 10*3/MM3 (ref 1.7–7)
NEUTROPHILS NFR BLD AUTO: 72.1 % (ref 42.7–76)
NRBC BLD AUTO-RTO: 0 /100 WBC (ref 0–0.2)
PLATELET # BLD AUTO: 132 10*3/MM3 (ref 140–450)
PMV BLD AUTO: 10.9 FL (ref 6–12)
POTASSIUM SERPL-SCNC: 4.5 MMOL/L (ref 3.5–5.2)
PROT SERPL-MCNC: 6.1 G/DL (ref 6–8.5)
RBC # BLD AUTO: 4.06 10*6/MM3 (ref 4.14–5.8)
SODIUM SERPL-SCNC: 134 MMOL/L (ref 136–145)
WBC NRBC COR # BLD: 5.89 10*3/MM3 (ref 3.4–10.8)

## 2021-12-16 PROCEDURE — 80053 COMPREHEN METABOLIC PANEL: CPT | Performed by: INTERNAL MEDICINE

## 2021-12-16 PROCEDURE — 99024 POSTOP FOLLOW-UP VISIT: CPT | Performed by: PODIATRIST

## 2021-12-16 PROCEDURE — 25010000002 MORPHINE PER 10 MG: Performed by: PODIATRIST

## 2021-12-16 PROCEDURE — 97606 NEG PRS WND THER DME>50 SQCM: CPT

## 2021-12-16 PROCEDURE — 25010000002 PIPERACILLIN SOD-TAZOBACTAM PER 1 G: Performed by: INTERNAL MEDICINE

## 2021-12-16 PROCEDURE — 85025 COMPLETE CBC W/AUTO DIFF WBC: CPT | Performed by: INTERNAL MEDICINE

## 2021-12-16 PROCEDURE — 83605 ASSAY OF LACTIC ACID: CPT | Performed by: INTERNAL MEDICINE

## 2021-12-16 PROCEDURE — 63710000001 INSULIN LISPRO (HUMAN) PER 5 UNITS: Performed by: PODIATRIST

## 2021-12-16 PROCEDURE — 82962 GLUCOSE BLOOD TEST: CPT

## 2021-12-16 PROCEDURE — 63710000001 INSULIN DETEMIR PER 5 UNITS: Performed by: PODIATRIST

## 2021-12-16 PROCEDURE — 97110 THERAPEUTIC EXERCISES: CPT

## 2021-12-16 PROCEDURE — 11042 DBRDMT SUBQ TIS 1ST 20SQCM/<: CPT | Performed by: PODIATRIST

## 2021-12-16 RX ADMIN — INSULIN LISPRO 2 UNITS: 100 INJECTION, SOLUTION INTRAVENOUS; SUBCUTANEOUS at 11:39

## 2021-12-16 RX ADMIN — PREGABALIN 25 MG: 25 CAPSULE ORAL at 08:04

## 2021-12-16 RX ADMIN — HYDROCODONE BITARTRATE AND ACETAMINOPHEN 1 TABLET: 10; 325 TABLET ORAL at 02:51

## 2021-12-16 RX ADMIN — SODIUM CHLORIDE, PRESERVATIVE FREE 10 ML: 5 INJECTION INTRAVENOUS at 20:25

## 2021-12-16 RX ADMIN — BUPROPION HYDROCHLORIDE 150 MG: 150 TABLET, EXTENDED RELEASE ORAL at 20:22

## 2021-12-16 RX ADMIN — INSULIN LISPRO 2 UNITS: 100 INJECTION, SOLUTION INTRAVENOUS; SUBCUTANEOUS at 17:22

## 2021-12-16 RX ADMIN — INSULIN LISPRO 4 UNITS: 100 INJECTION, SOLUTION INTRAVENOUS; SUBCUTANEOUS at 21:51

## 2021-12-16 RX ADMIN — APIXABAN 5 MG: 5 TABLET, FILM COATED ORAL at 08:04

## 2021-12-16 RX ADMIN — PIPERACILLIN SODIUM AND TAZOBACTAM SODIUM 4.5 G: 4; .5 INJECTION, POWDER, LYOPHILIZED, FOR SOLUTION INTRAVENOUS at 22:30

## 2021-12-16 RX ADMIN — PREGABALIN 25 MG: 25 CAPSULE ORAL at 20:23

## 2021-12-16 RX ADMIN — BUPROPION HYDROCHLORIDE 150 MG: 150 TABLET, EXTENDED RELEASE ORAL at 08:04

## 2021-12-16 RX ADMIN — ATORVASTATIN CALCIUM 10 MG: 10 TABLET, FILM COATED ORAL at 20:23

## 2021-12-16 RX ADMIN — PIPERACILLIN SODIUM AND TAZOBACTAM SODIUM 4.5 G: 4; .5 INJECTION, POWDER, LYOPHILIZED, FOR SOLUTION INTRAVENOUS at 13:51

## 2021-12-16 RX ADMIN — APIXABAN 5 MG: 5 TABLET, FILM COATED ORAL at 20:23

## 2021-12-16 RX ADMIN — MORPHINE SULFATE 2 MG: 2 INJECTION, SOLUTION INTRAMUSCULAR; INTRAVENOUS at 11:39

## 2021-12-16 RX ADMIN — TAMSULOSIN HYDROCHLORIDE 0.4 MG: 0.4 CAPSULE ORAL at 20:23

## 2021-12-16 RX ADMIN — INSULIN DETEMIR 25 UNITS: 100 INJECTION, SOLUTION SUBCUTANEOUS at 21:50

## 2021-12-16 RX ADMIN — MORPHINE SULFATE 2 MG: 2 INJECTION, SOLUTION INTRAMUSCULAR; INTRAVENOUS at 18:34

## 2021-12-16 RX ADMIN — CITALOPRAM HYDROBROMIDE 10 MG: 20 TABLET ORAL at 20:23

## 2021-12-16 RX ADMIN — PIPERACILLIN SODIUM AND TAZOBACTAM SODIUM 4.5 G: 4; .5 INJECTION, POWDER, LYOPHILIZED, FOR SOLUTION INTRAVENOUS at 06:15

## 2021-12-16 RX ADMIN — SODIUM CHLORIDE, PRESERVATIVE FREE 10 ML: 5 INJECTION INTRAVENOUS at 08:03

## 2021-12-16 RX ADMIN — HYDROCODONE BITARTRATE AND ACETAMINOPHEN 1 TABLET: 10; 325 TABLET ORAL at 20:23

## 2021-12-16 RX ADMIN — MORPHINE SULFATE 2 MG: 2 INJECTION, SOLUTION INTRAMUSCULAR; INTRAVENOUS at 22:28

## 2021-12-16 RX ADMIN — HYDROCODONE BITARTRATE AND ACETAMINOPHEN 1 TABLET: 10; 325 TABLET ORAL at 13:59

## 2021-12-16 RX ADMIN — INSULIN LISPRO 2 UNITS: 100 INJECTION, SOLUTION INTRAVENOUS; SUBCUTANEOUS at 08:03

## 2021-12-16 RX ADMIN — FAMOTIDINE 40 MG: 20 TABLET ORAL at 08:04

## 2021-12-16 NOTE — SIGNIFICANT NOTE
12/16/21 1150   Wound 06/22/21 1133 Left lateral heel   Placement Date/Time: 06/22/21 1133   Present on Hospital Admission: Yes  Side: Left  Orientation: lateral  Location: heel   Wound Image     Dressing Appearance moist drainage; intact; dry   Base red; moist   Periwound dry; intact   Periwound Temperature warm   Periwound Skin Turgor soft   Wound Length (cm) 2.6 cm   Wound Width (cm) 1.9 cm   Wound Depth (cm) 1.1 cm   Drainage Characteristics/Odor serosanguineous   Drainage Amount small   Care, Wound cleansed with; irrigated with; sterile normal saline   Dressing Care dressing applied; dressing moistened; other (see comments); gauze, dry; silicone; border dressing; gauze; tubular wrap; elastic bandage  (hydrafera blue)   Periwound Care barrier film applied   Wound 12/02/21 Right anterior foot Incision   Placement Date: 12/02/21   Side: Right  Orientation: anterior  Location: foot  Primary Wound Type: Incision   Wound Image     Dressing Appearance intact; dry   Base red; moist; bleeding; exposed structure   Periwound edematous; ecchymotic; redness   Periwound Temperature warm   Periwound Skin Turgor soft   Wound Length (cm) 6.8 cm   Wound Width (cm) 15 cm   Wound Depth (cm) 3.3 cm   Drainage Characteristics/Odor sanguineous   Drainage Amount moderate   Care, Wound cleansed with; irrigated with; sterile normal saline; negative pressure wound therapy   Dressing Care dressing applied; skin barrier agent applied; transparent film; silver impregnated; foam   Periwound Care barrier film applied   NPWT (Negative Pressure Wound Therapy) 12/13/21 1200 Right anterior foot   Placement Date/Time: 12/13/21 1200   Location: Right anterior foot   Therapy Setting continuous therapy   Dressing foam, silver   Instillation normal saline   Pressure Setting 125 mmHg   Sponges Inserted 3   Sponges Removed 2   Finger sweep complete Yes     Wound follow-up/ VAC follow up: Patient receiving Veraflow VAC treatments to right foot.  Patient responding well to Veraflow treatments, will continue this form of therapy, podiatry agreeable. Serosanguineous drainage noted in cannister. Removed 2 sponges from wound bed. Cleansed and irrigated with copious amount of normal saline and gauze. Moderate amount of sanguineous drainage, controlled with normal saline and gauze application. Plantar flap is dusky. Wound bed tissue is moist and beefy red, exposed structure is visible. Placed gray honey comb foam to base of wound bed and covered with solid piece of gray foam. Applied eakins and PolyMem silver foam as barrier along foam to intact skin. Applied skin barrier to periwound. Covered with transparent drape. Seal obtained, foam compressed; no leaks noted. Able to visualize saline installation to foam. 20 mL normal saline installation with a 3 minute dwell time. Discoloration to ankle is much improved, redness present but improved. Left heel had dressing intact. Dried bloody drainage noted to outer ace wrap. Podiatry present and performed debridement to the non-viable tissue around wound margins. Wound bed is red and moist, responding to Hydrafera blue. Cleansed wound bed with normal saline and gauze. Filled wound bed with Hydrafera blue and covered with dry gauze, gauze roll and ace wrap. Patient tolerated dressing changes well, remain in good spirits. Awaiting placement to rehab. Karon Bolton RN

## 2021-12-16 NOTE — PROGRESS NOTES
[facility] - PODIATRY    Today's Date: 12/16/21    Patient Name: Jose Shaikh  MRN: [id number]  CSN: [id number]  PCP: Corazon Gr MD  Referring Provider: Yung Grijalva MD  Attending Provider: Yung Grijalva MD  Length of Stay: 15    SUBJECTIVE   Chief Complaint: Right foot osteomyelitis    HPI: Jose Shaikh, a 61 y.o.male,     Procedure: I&D of right midfoot  Date: 10 December 2021    Procedure: Right midfoot amputation  Date: 2 December 2021    Patient states that his ulcers in both feet lower legs are feeling better today.    Patient sister was present during the exam.    Patient denies any fevers, chills, nausea, vomiting, shortness of breathe, nor any other constitutional signs nor symptoms.      Past Medical History:   Diagnosis Date   • Absence of toe of right foot    • Acute osteomyelitis of left calcaneus  8/18/2021   • Anxiety and depression    • Arthritis    • Claustrophobia    • Corns and callus    • Diabetic ulcer of left heel associated with type 2 DM 8/18/2021   • Diabetic ulcer of left heel associated with type 2 DM 7/6/2021   • Diabetic ulcer of right midfoot associated with type 2 DM 8/18/2021   • Difficulty walking    • Essential hypertension 8/31/2021   • Hammertoe    • Hyperlipidemia LDL goal <100 8/31/2021   • Ingrown toenail    • Obesity    • Paroxysmal atrial fibrillation 8/31/2021   • Polyneuropathy    • Pressure ulcer, stage 1    • Tinea unguium    • Type 2 diabetes mellitus with polyneuropathy      Past Surgical History:   Procedure Laterality Date   • CYST REMOVAL      center of back; benign   • INCISION AND DRAINAGE ABSCESS      back   • INCISION AND DRAINAGE LEG Right 12/10/2021    Procedure: INCISION AND DRAINAGE LOWER EXTREMITY;  Surgeon: Ash Leyva DPM;  Location: [facility] MAIN OR;  Service: Podiatry;  Laterality: Right;   • OTHER SURGICAL HISTORY      Surgical clips left foot   • TOE SURGERY Right     Removal of 5th toe   • TRANS METATARSAL AMPUTATION  Right 2021    Procedure: AMPUTATION TRANS METATARSAL;  Surgeon: Ash Leyva DPM;  Location: [facility] MAIN OR;  Service: Podiatry;  Laterality: Right;   • WRIST SURGERY Left     repair of injury     Family History   Problem Relation Age of Onset   • Heart disease Mother    • Heart disease Father    • Cancer Father         Unspecified   • Heart disease Brother      Social History     Socioeconomic History   • Marital status: Single   Tobacco Use   • Smoking status: Former Smoker     Packs/day: 0.00     Years: 1.00     Pack years: 0.00     Quit date: 1991     Years since quittin.3   • Smokeless tobacco: Never Used   • Tobacco comment: quit at age 30   Vaping Use   • Vaping Use: Never used   Substance and Sexual Activity   • Alcohol use: Yes     Comment: Rare   • Drug use: Yes     Types: Marijuana     Comment: Daily   • Sexual activity: Defer     Allergies   Allergen Reactions   • Adhesive Tape Rash     Current Facility-Administered Medications   Medication Dose Route Frequency Provider Last Rate Last Admin   • acetaminophen (TYLENOL) suppository 650 mg  650 mg Rectal Q4H PRN Ash Leyva DPM       • acetaminophen (TYLENOL) tablet 650 mg  650 mg Oral Q4H PRN Ash Leyva DPM   650 mg at 21   • aluminum-magnesium hydroxide-simethicone (MAALOX MAX) 400-400-40 MG/5ML suspension 15 mL  15 mL Oral Q6H PRN Ash Leyva DPM       • apixaban (ELIQUIS) tablet 5 mg  5 mg Oral Q12H Ash Leyva DPM   5 mg at 21 0804   • atorvastatin (LIPITOR) tablet 10 mg  10 mg Oral Nightly Ash Leyva DPM   10 mg at 12/15/21 2121   • sennosides-docusate (PERICOLACE) 8.6-50 MG per tablet 1 tablet  1 tablet Oral BID Ash Leyva DPM   1 tablet at 21    And   • polyethylene glycol (MIRALAX) packet 17 g  17 g Oral Daily PRN Ash Leyva DPM        And   • bisacodyl (DULCOLAX) EC tablet 5 mg  5 mg Oral Daily PRN Gordon  Ash Aleman DPM        And   • bisacodyl (DULCOLAX) suppository 10 mg  10 mg Rectal Daily PRN Ash Leyva DPM       • buPROPion XL (WELLBUTRIN XL) 24 hr tablet 150 mg  150 mg Oral BID Ash Leyva DPM   150 mg at 12/16/21 0804   • citalopram (CeleXA) tablet 10 mg  10 mg Oral Daily Ash Leyva DPM   10 mg at 12/15/21 2122   • dextrose (GLUTOSE) oral gel 15 g  15 g Oral Q15 Min PRN Ash Leyva DPM       • dextrose 10 % infusion  25 g Intravenous Q15 Min PRN Ash Leyva DPM       • diphenhydrAMINE (BENADRYL) capsule 25 mg  25 mg Oral Q6H PRN Yung Grijalva MD   25 mg at 12/14/21 1614   • famotidine (PEPCID) tablet 40 mg  40 mg Oral Daily Ash Leyva DPM   40 mg at 12/16/21 0804   • glucagon (human recombinant) (GLUCAGEN DIAGNOSTIC) injection 1 mg  1 mg Subcutaneous Q15 Min PRN Ash Leyva DPM       • HYDROcodone-acetaminophen (NORCO)  MG per tablet 1 tablet  1 tablet Oral Q4H PRN Ash Leyva DPM   1 tablet at 12/16/21 0251   • HYDROcodone-acetaminophen (NORCO) 5-325 MG per tablet 1 tablet  1 tablet Oral Q4H PRN Ash Leyva DPM   1 tablet at 12/13/21 0533   • insulin detemir (LEVEMIR) injection 25 Units  25 Units Subcutaneous Nightly Ash Leyva DPM   25 Units at 12/15/21 2138   • insulin lispro (humaLOG) injection 0-9 Units  0-9 Units Subcutaneous 4x Daily With Meals & Nightly Ash Leyva DPM   2 Units at 12/16/21 1139   • lactated ringers infusion  9 mL/hr Intravenous Continuous PRN Ash Leyva DPM       • lisinopril (PRINIVIL,ZESTRIL) tablet 20 mg  20 mg Oral Daily Ash Leyva DPM   20 mg at 12/15/21 0844   • loperamide (IMODIUM) capsule 2 mg  2 mg Oral Q4H PRN Ash Leyva DPM       • metoprolol succinate XL (TOPROL-XL) 24 hr tablet 25 mg  25 mg Oral Q24H Ash Leyva DPM   25 mg at 12/15/21 0843   • morphine injection 2 mg  2 mg  Intravenous Q2H PRN Ash Leyva DPM   2 mg at 12/16/21 1139   • naloxone (NARCAN) injection 0.4 mg  0.4 mg Intravenous Q5 Min PRN Ash Leyva DPM       • ondansetron (ZOFRAN) tablet 4 mg  4 mg Oral Q6H PRN Ash Leyva DPM        Or   • ondansetron (ZOFRAN) injection 4 mg  4 mg Intravenous Q6H PRN Ash Leyva DPM       • Pharmacy to Dose Zosyn   Does not apply Continuous PRN Yung Grijalva MD       • piperacillin-tazobactam (ZOSYN) 4.5 g/100 mL 0.9% NS IVPB (mbp)  4.5 g Intravenous Q8H Yung Grijalva MD 0 mL/hr at 12/13/21 1750 4.5 g at 12/16/21 0615   • pregabalin (LYRICA) capsule 25 mg  25 mg Oral Q12H Ash Leyva DPM   25 mg at 12/16/21 0804   • sodium chloride 0.9 % flush 10 mL  10 mL Intravenous Q12H Ash Leyva DPM   10 mL at 12/16/21 0803   • sodium chloride 0.9 % flush 10 mL  10 mL Intravenous PRN Ash Leyva DPM       • tamsulosin (FLOMAX) 24 hr capsule 0.4 mg  0.4 mg Oral Nightly Ash Leyva DPM   0.4 mg at 12/15/21 2121     Review of Systems   Constitutional: Negative.    Skin:        Bilateral foot ulcers.  Reporting increasing pain in his anterior right calf.   All other systems reviewed and are negative.      OBJECTIVE     Vitals:    12/16/21 1121   BP: 117/70   Pulse: 62   Resp: 18   Temp: 99.4 °F (37.4 °C)   SpO2: 97%       PHYSICAL EXAM    GEN:   A&Ox3, NAD. Pt presents in hospital bed.     Neurovascular status unchanged    Ortho: Right midfoot amputation    Right foot: Improvement in edema and erythema seen in midfoot previously.  Healthy granular wound base with bloody ooze.  Small amount of necrotic tissue on distal lateral aspect of plantar flap.    Left plantar heel: Stage 3 ulceration present.  Nonviable tissue is present.  No surrounding, edema, erythema, lymphangitis, fluctuance, nor signs of infection.  Serous drainage present.  22 mm x 10 mm x 12 mm.  Does not probe to bone.  This area was debrided  via excisional subcutaneous debridement with a 10 scalpel blade.  Post debridement measurements were 25 mm x 14 mm x 13 mm in depth.    LABORATORY/CULTURE RESULTS:  Results from last 7 days   Lab Units 12/16/21  0610 12/14/21  0513 12/10/21  0626   WBC 10*3/mm3 5.89 6.37 8.17   HEMOGLOBIN g/dL 11.2* 11.7* 11.4*   HEMATOCRIT % 34.8* 35.8* 34.4*   PLATELETS 10*3/mm3 132* 141 129*     Results from last 7 days   Lab Units 12/16/21  0610 12/14/21  0513 12/10/21  0626   SODIUM mmol/L 134* 135* 134*   POTASSIUM mmol/L 4.5 4.7 4.3   CHLORIDE mmol/L 99 101 102   CO2 mmol/L 27.0 29.1* 24.1   BUN mg/dL 12 14 9   CREATININE mg/dL 0.77 0.91 0.81   CALCIUM mg/dL 8.4* 8.9 8.3*   BILIRUBIN mg/dL 0.6  --  0.8   ALK PHOS U/L 437*  --  327*   ALT (SGPT) U/L 36  --  50*   AST (SGOT) U/L 47*  --  62*   GLUCOSE mg/dL 175* 155* 149*         Microbiology Results (last 10 days)     Procedure Component Value - Date/Time    Anaerobic Culture - Wound, Foot, Right [[id number]]  (Normal) Collected: 12/10/21 1835    Lab Status: Final result Specimen: Wound from Foot, Right Updated: 12/15/21 0711     Anaerobic Culture No anaerobes isolated at 5 days    Wound Culture - Wound, Foot, Right [[id number]]  (Abnormal) Collected: 12/10/21 1835    Lab Status: Final result Specimen: Wound from Foot, Right Updated: 12/12/21 0950     Wound Culture Moderate growth (3+) Corynebacterium species     Comment: No definitive guidelines. All are susceptible to vancomycin. Resistance to penicillins & cephalosporins does occur.        Gram Stain Moderate (3+) WBCs seen      Rare (1+) Gram negative bacilli, tiny           ASSESSMENT/PLAN     Patient Active Problem List   Diagnosis   • Diabetic ulcer of left heel associated with type 2 DM   • Acute osteomyelitis of left calcaneus    • Diabetic ulcer of left heel associated with type 2 DM   • Diabetic ulcer of right midfoot associated with type 2 DM   • Paroxysmal atrial fibrillation   • Essential hypertension   •  Hyperlipidemia LDL goal <100   • Cellulitis and abscess of foot   • Lactic acidosis       Comprehensive lower extremity examination and evaluation was performed.    Continue current wound care orders.      Patient was seen in conjunction with the wound care nurse.    Basil with the social work for pending rehabilitation bed.    Plan was discussed with the patient's nurse.          Lab Frequency Next Occurrence   Lipid Panel Once 02/27/2022   WOUND CARE HYPERBARIC weekdays        This document has been electronically signed by Ash Leyva DPM on December 16, 2021 13:12 EST

## 2021-12-16 NOTE — THERAPY TREATMENT NOTE
Acute Care - Physical Therapy Treatment Note   [facility]     Patient Name: Jose Shaikh  : 1960  MRN: [id number]  Today's Date: 2021      Visit Dx:   Admit date: 2021     Referring Physician: Yung Grijalva MD     Surgery Date:2021 - 12/10/2021   Procedure(s) (LRB):  INCISION AND DRAINAGE LOWER EXTREMITY (Right)         ICD-10-CM ICD-9-CM   1. Diabetic ulcer of right midfoot associated with type 2 DM  E11.621 250.80    L97.415 707.14   2. Diabetic ulcer of right midfoot associated with type 2 diabetes mellitus, with muscle involvement without evidence of necrosis (HCC)  E11.621 250.80    L97.415 707.14   3. Difficulty walking  R26.2 719.7   4. Cellulitis and abscess of foot  L03.119 682.7    L02.619      Patient Active Problem List   Diagnosis   • Diabetic ulcer of left heel associated with type 2 DM   • Acute osteomyelitis of left calcaneus    • Diabetic ulcer of left heel associated with type 2 DM   • Diabetic ulcer of right midfoot associated with type 2 DM   • Paroxysmal atrial fibrillation   • Essential hypertension   • Hyperlipidemia LDL goal <100   • Cellulitis and abscess of foot   • Lactic acidosis     Past Medical History:   Diagnosis Date   • Absence of toe of right foot    • Acute osteomyelitis of left calcaneus  2021   • Anxiety and depression    • Arthritis    • Claustrophobia    • Corns and callus    • Diabetic ulcer of left heel associated with type 2 DM 2021   • Diabetic ulcer of left heel associated with type 2 DM 2021   • Diabetic ulcer of right midfoot associated with type 2 DM 2021   • Difficulty walking    • Essential hypertension 2021   • Hammertoe    • Hyperlipidemia LDL goal <100 2021   • Ingrown toenail    • Obesity    • Paroxysmal atrial fibrillation 2021   • Polyneuropathy    • Pressure ulcer, stage 1    • Tinea unguium    • Type 2 diabetes mellitus with polyneuropathy      Past Surgical History:   Procedure Laterality Date   • CYST  REMOVAL      center of back; benign   • INCISION AND DRAINAGE ABSCESS      back   • INCISION AND DRAINAGE LEG Right 12/10/2021    Procedure: INCISION AND DRAINAGE LOWER EXTREMITY;  Surgeon: Ash Leyva DPM;  Location: [facility] MAIN OR;  Service: Podiatry;  Laterality: Right;   • OTHER SURGICAL HISTORY      Surgical clips left foot   • TOE SURGERY Right     Removal of 5th toe   • TRANS METATARSAL AMPUTATION Right 12/2/2021    Procedure: AMPUTATION TRANS METATARSAL;  Surgeon: Ash Leyva DPM;  Location: [facility] MAIN OR;  Service: Podiatry;  Laterality: Right;   • WRIST SURGERY Left     repair of injury     PT Assessment (last 12 hours)     PT Evaluation and Treatment     Row Name 12/16/21 1031          Physical Therapy Time and Intention    Subjective Information no complaints (P)   -     Document Type therapy note (daily note) (P)   -CW     Mode of Treatment individual therapy; physical therapy (P)   -CW     Patient Effort good (P)   -     Row Name 12/16/21 1031          Bed Mobility    Comment (Bed Mobility) Pt declined to participate in any bed mobility or transfers (P)   -     Row Name 12/16/21 1031          Motor Skills    Motor Skills therapeutic exercise (P)   -     Therapeutic Exercise hip; knee; ankle (P)   -     Row Name 12/16/21 1031          Hip (Therapeutic Exercise)    Hip (Therapeutic Exercise) isometric exercises; strengthening exercise (P)   -     Hip Isometrics (Therapeutic Exercise) bilateral; gluteal sets; supine; 10 repetitions; 3 second hold (P)   -CW     Hip Strengthening (Therapeutic Exercise) bilateral; aBduction; aDduction; heel slides; supine; 2 sets; 10 repetitions (P)   -     Row Name 12/16/21 1031          Knee (Therapeutic Exercise)    Knee (Therapeutic Exercise) isometric exercises; strengthening exercise (P)   -CW     Knee Isometrics (Therapeutic Exercise) bilateral; quad sets; supine; 2 sets; 10 repetitions (P)   -CW     Knee Strengthening  (Therapeutic Exercise) bilateral; SLR (straight leg raise); SAQ (short arc quad); supine; 2 sets; 10 repetitions (P)   -CW     Row Name 12/16/21 1031          Ankle (Therapeutic Exercise)    Ankle (Therapeutic Exercise) AROM (active range of motion) (P)   -CW     Ankle AROM (Therapeutic Exercise) bilateral; dorsiflexion; plantarflexion; supine; 2 sets; 10 repetitions (P)   -CW     Row Name             Wound 06/22/21 1133 Left lateral heel    Wound - Properties Group Placement Date: 06/22/21  -FABIOLA Placement Time: 1133  -FABIOLA Present on Hospital Admission: Y  -FABIOLA Side: Left  -FABIOLA Orientation: lateral  -FABIOLA Location: heel  -FABIOLA     Retired Wound - Properties Group Date first assessed: 06/22/21  -FABIOLA Time first assessed: 1133  -FABIOLA Present on Hospital Admission: Y  -FABIOLA Side: Left  -FABIOLA Location: heel  -FABIOLA     Row Name             Wound 12/02/21 Right anterior foot Incision    Wound - Properties Group Placement Date: 12/02/21  -ES Side: Right  -ES Orientation: anterior  -ES Location: foot  -ES Primary Wound Type: Incision  -ES     Retired Wound - Properties Group Date first assessed: 12/02/21  -ES Side: Right  -ES Location: foot  -ES Primary Wound Type: Incision  -ES     Row Name             NPWT (Negative Pressure Wound Therapy) 12/13/21 1200 Right anterior foot    NPWT (Negative Pressure Wound Therapy) - Properties Group Placement Date: 12/13/21  -RASHARD Placement Time: 1200  -RASHARD Location: Right anterior foot  -RASHARD     Retired NPWT (Negative Pressure Wound Therapy) - Properties Group Placement Date: 12/13/21  -RASHARD Placement Time: 1200  -RASHARD Location: Right anterior foot  -RASHARD     Row Name 12/16/21 1031          Progress Summary (PT)    Progress Toward Functional Goals (PT) progress toward functional goals is fair (P)   -CW     Daily Progress Summary (PT) Patient was able to complete supine exercises. Pt declined to perform any bed mobility or transfers. Pt continues to benefit from skilled physcial therapy. (P)   -CW           User Key   (r) = Recorded By, (t) = Taken By, (c) = Cosigned By    Initials Name Provider Type    Karon Jordan, RN Registered Nurse    Marlen Vaughn, RN Registered Nurse    CW Jatin Last, PT Student PT Student    Katlyn Garcia, RN Registered Nurse                Physical Therapy Education                 Title: PT OT SLP Therapies (Done)     Topic: Physical Therapy (Done)     Point: Mobility training (Done)     Learning Progress Summary           Patient Acceptance, E, VU,NR by  at 12/14/2021 1605      Show all documentation for this point (4)                 Point: Home exercise program (Done)     Learning Progress Summary           Patient Acceptance, E,TB, VU by  at 12/14/2021 1529      Show all documentation for this point (3)                 Point: Body mechanics (Done)     Learning Progress Summary           Patient Acceptance, E,TB, VU by  at 12/14/2021 1529      Show all documentation for this point (3)                 Point: Precautions (Done)     Learning Progress Summary           Patient Acceptance, E, VU,NR by  at 12/14/2021 1605      Show all documentation for this point (4)                             User Key     Initials Effective Dates Name Provider Type Discipline     04/25/21 -  Kim Esparza, PT Physical Therapist PT    AW 06/21/21 -  Katy Esparza, RN Registered Nurse Nurse              PT Recommendation and Plan     Progress Summary (PT)  Progress Toward Functional Goals (PT): (P) progress toward functional goals is fair  Daily Progress Summary (PT): (P) Patient was able to complete supine exercises. Pt declined to perform any bed mobility or transfers. Pt continues to benefit from skilled physcial therapy.   Outcome Measures     Row Name 12/15/21 1000 12/14/21 1500          How much help from another person do you currently need...    Turning from your back to your side while in flat bed without using bedrails? 3  -DP 3  -CS     Moving from lying on back to sitting on  the side of a flat bed without bedrails? 2  -DP 3  -CS     Moving to and from a bed to a chair (including a wheelchair)? 2  -DP 2  -CS     Standing up from a chair using your arms (e.g., wheelchair, bedside chair)? 2  -DP 2  -CS     Climbing 3-5 steps with a railing? 1  -DP 1  -CS     To walk in hospital room? 1  -DP 1  -CS     AM-PAC 6 Clicks Score (PT) 11  -DP 12  -CS            Functional Assessment    Outcome Measure Options AM-PAC 6 Clicks Basic Mobility (PT)  -DP AM-PAC 6 Clicks Basic Mobility (PT)  -CS           User Key  (r) = Recorded By, (t) = Taken By, (c) = Cosigned By    Initials Name Provider Type    DP Deedee Landers, PT Physical Therapist    Kim Avila, PT Physical Therapist                 Time Calculation:    PT Charges     Row Name 12/16/21 1036             Time Calculation    PT Received On 12/16/21 (P)   -CW              Timed Charges    94555 - PT Therapeutic Exercise Minutes 14 (P)   -CW              Total Minutes    Timed Charges Total Minutes 14 (P)   -CW       Total Minutes 14 (P)   -CW            User Key  (r) = Recorded By, (t) = Taken By, (c) = Cosigned By    Initials Name Provider Type    CW Jatin Last, PT Student PT Student              Therapy Charges for Today     Code Description Service Date Service Provider Modifiers Qty    [id number]  PT THER PROC EA 15 MIN 12/16/2021 Jatin Last, PT Student GP 1          PT G-Codes  Outcome Measure Options: AM-PAC 6 Clicks Basic Mobility (PT)  AM-PAC 6 Clicks Score (PT): 11    Jatin Last PT Student  12/16/2021

## 2021-12-16 NOTE — PLAN OF CARE
Goal Outcome Evaluation:  Plan of Care Reviewed With: patient        Progress: improving  Outcome Summary: patient alert and oriented. prn pain meds given. wound vac without complications (complications)    He requested prn pain meds a few times. 2 assist to bedside commode. He had xlarge stool this shift. Urinating well

## 2021-12-16 NOTE — PROGRESS NOTES
[facility]     Progress Note    Patient Name: Jose Shaikh  : 1960  MRN: [id number]  Primary Care Physician:  Corazon Gr MD  Date of admission: 2021      Subjective   Brief summary.  Patient admitted with cellulitis of the foot status post amputation      HPI:  Follow-up on cellulitis of the foot.  Feeling better   No fever chills    Review of Systems     No fever chills.  No shortness of breath.  Pain tolerable.  No nausea/ Vomitting  BS stable.      Objective     Vitals:   Temp:  [98 °F (36.7 °C)-99.4 °F (37.4 °C)] 98 °F (36.7 °C)  Heart Rate:  [58-73] 63  Resp:  [18] 18  BP: ()/(52-70) 96/58    Physical Exam :     Morbidly obese male not in acute distress.  Heart regular.  Lungs clear.  Abdomen obese and soft.  Extremity with no significant edema, right foot with wound VAC      Result Review:  I have personally reviewed the results from the time of this admission to 2021 18:17 EST and agree with these findings:  [x]  Laboratory  []  Microbiology  []  Radiology  []  EKG/Telemetry   []  Cardiology/Vascular   []  Pathology  []  Old records  []  Other:           Assessment / Plan       Active Hospital Problems:  Active Hospital Problems    Diagnosis    • Cellulitis and abscess of foot    • Lactic acidosis    • Essential hypertension    • Diabetic ulcer of right midfoot associated with type 2 DM    • Diabetic ulcer of left heel associated with type 2 DM        Plan:   Stable.  Continue ABX. Continue veroflo Wound vac.  Continue wound care, monitor labs  Waiting for rehab.       DVT prophylaxis:  Medical DVT prophylaxis orders are present.    CODE STATUS:   Code Status (Patient has no pulse and is not breathing): CPR (Attempt to Resuscitate)  Medical Interventions (Patient has pulse or is breathing): Full Support              Electronically signed by Yung Grijalva MD, 21, 6:19 PM EST.

## 2021-12-16 NOTE — PLAN OF CARE
Goal Outcome Evaluation:  Plan of Care Reviewed With: patient        Progress: no change  Pt dressing change done today per wound nurse, VSS stable, pt still receiving IV antibiotics, wound vac in place on right foot, left foot dressing dry and intact,

## 2021-12-17 LAB
GLUCOSE BLDC GLUCOMTR-MCNC: 174 MG/DL (ref 70–99)
GLUCOSE BLDC GLUCOMTR-MCNC: 183 MG/DL (ref 70–99)
GLUCOSE BLDC GLUCOMTR-MCNC: 197 MG/DL (ref 70–99)
GLUCOSE BLDC GLUCOMTR-MCNC: 283 MG/DL (ref 70–99)

## 2021-12-17 PROCEDURE — 99024 POSTOP FOLLOW-UP VISIT: CPT | Performed by: PODIATRIST

## 2021-12-17 PROCEDURE — 63710000001 INSULIN DETEMIR PER 5 UNITS: Performed by: PODIATRIST

## 2021-12-17 PROCEDURE — 82962 GLUCOSE BLOOD TEST: CPT

## 2021-12-17 PROCEDURE — 25010000002 MORPHINE PER 10 MG: Performed by: PODIATRIST

## 2021-12-17 PROCEDURE — 25010000002 MORPHINE PER 10 MG: Performed by: INTERNAL MEDICINE

## 2021-12-17 PROCEDURE — 63710000001 INSULIN LISPRO (HUMAN) PER 5 UNITS: Performed by: PODIATRIST

## 2021-12-17 PROCEDURE — 97606 NEG PRS WND THER DME>50 SQCM: CPT

## 2021-12-17 PROCEDURE — 25010000002 PIPERACILLIN SOD-TAZOBACTAM PER 1 G: Performed by: INTERNAL MEDICINE

## 2021-12-17 RX ORDER — MORPHINE SULFATE 2 MG/ML
2 INJECTION, SOLUTION INTRAMUSCULAR; INTRAVENOUS
Status: DISCONTINUED | OUTPATIENT
Start: 2021-12-17 | End: 2021-12-18

## 2021-12-17 RX ORDER — LIDOCAINE HYDROCHLORIDE 10 MG/ML
INJECTION, SOLUTION INFILTRATION; PERINEURAL
Status: DISPENSED
Start: 2021-12-17 | End: 2021-12-17

## 2021-12-17 RX ADMIN — PREGABALIN 25 MG: 25 CAPSULE ORAL at 21:44

## 2021-12-17 RX ADMIN — PIPERACILLIN SODIUM AND TAZOBACTAM SODIUM 4.5 G: 4; .5 INJECTION, POWDER, LYOPHILIZED, FOR SOLUTION INTRAVENOUS at 22:00

## 2021-12-17 RX ADMIN — CITALOPRAM HYDROBROMIDE 10 MG: 20 TABLET ORAL at 21:42

## 2021-12-17 RX ADMIN — INSULIN LISPRO 2 UNITS: 100 INJECTION, SOLUTION INTRAVENOUS; SUBCUTANEOUS at 11:41

## 2021-12-17 RX ADMIN — MORPHINE SULFATE 2 MG: 2 INJECTION, SOLUTION INTRAMUSCULAR; INTRAVENOUS at 21:44

## 2021-12-17 RX ADMIN — INSULIN LISPRO 2 UNITS: 100 INJECTION, SOLUTION INTRAVENOUS; SUBCUTANEOUS at 08:05

## 2021-12-17 RX ADMIN — INSULIN DETEMIR 25 UNITS: 100 INJECTION, SOLUTION SUBCUTANEOUS at 22:00

## 2021-12-17 RX ADMIN — TAMSULOSIN HYDROCHLORIDE 0.4 MG: 0.4 CAPSULE ORAL at 21:43

## 2021-12-17 RX ADMIN — PREGABALIN 25 MG: 25 CAPSULE ORAL at 08:05

## 2021-12-17 RX ADMIN — BUPROPION HYDROCHLORIDE 150 MG: 150 TABLET, EXTENDED RELEASE ORAL at 21:43

## 2021-12-17 RX ADMIN — INSULIN LISPRO 2 UNITS: 100 INJECTION, SOLUTION INTRAVENOUS; SUBCUTANEOUS at 22:00

## 2021-12-17 RX ADMIN — HYDROCODONE BITARTRATE AND ACETAMINOPHEN 1 TABLET: 10; 325 TABLET ORAL at 10:40

## 2021-12-17 RX ADMIN — PIPERACILLIN SODIUM AND TAZOBACTAM SODIUM 4.5 G: 4; .5 INJECTION, POWDER, LYOPHILIZED, FOR SOLUTION INTRAVENOUS at 13:39

## 2021-12-17 RX ADMIN — ATORVASTATIN CALCIUM 10 MG: 10 TABLET, FILM COATED ORAL at 21:43

## 2021-12-17 RX ADMIN — INSULIN LISPRO 6 UNITS: 100 INJECTION, SOLUTION INTRAVENOUS; SUBCUTANEOUS at 17:40

## 2021-12-17 RX ADMIN — SODIUM CHLORIDE, PRESERVATIVE FREE 10 ML: 5 INJECTION INTRAVENOUS at 21:42

## 2021-12-17 RX ADMIN — FAMOTIDINE 40 MG: 20 TABLET ORAL at 08:05

## 2021-12-17 RX ADMIN — MORPHINE SULFATE 2 MG: 2 INJECTION, SOLUTION INTRAMUSCULAR; INTRAVENOUS at 06:29

## 2021-12-17 RX ADMIN — BUPROPION HYDROCHLORIDE 150 MG: 150 TABLET, EXTENDED RELEASE ORAL at 08:05

## 2021-12-17 RX ADMIN — MORPHINE SULFATE 2 MG: 2 INJECTION, SOLUTION INTRAMUSCULAR; INTRAVENOUS at 14:06

## 2021-12-17 RX ADMIN — APIXABAN 5 MG: 5 TABLET, FILM COATED ORAL at 21:43

## 2021-12-17 RX ADMIN — APIXABAN 5 MG: 5 TABLET, FILM COATED ORAL at 08:05

## 2021-12-17 RX ADMIN — PIPERACILLIN SODIUM AND TAZOBACTAM SODIUM 4.5 G: 4; .5 INJECTION, POWDER, LYOPHILIZED, FOR SOLUTION INTRAVENOUS at 06:14

## 2021-12-17 NOTE — PROGRESS NOTES
[facility]     Progress Note    Patient Name: Jose Shaikh  : 1960  MRN: [id number]  Primary Care Physician:  Corazon Gr MD  Date of admission: 2021      Subjective   Brief summary.  Patient admitted with cellulitis of the foot status post amputation      HPI:  Follow-up on cellulitis of the foot.  No new issues, sleepy  Feeling better   No fever chills    Review of Systems     Complains of fatigue  Pain tolerable.  No nausea/ Vomitting  BS stable.      Objective     Vitals:   Temp:  [98 °F (36.7 °C)-98.9 °F (37.2 °C)] 98.2 °F (36.8 °C)  Heart Rate:  [62-67] 64  Resp:  [16-20] 16  BP: ()/(50-65) 120/64    Physical Exam :     Morbidly obese male not in acute distress.  Heart regular.  Lungs clear.  Abdomen obese and soft.  Extremity with no significant edema, right foot with wound VAC      Result Review:  I have personally reviewed the results from the time of this admission to 2021 12:27 EST and agree with these findings:  [x]  Laboratory  []  Microbiology  []  Radiology  []  EKG/Telemetry   []  Cardiology/Vascular   []  Pathology  []  Old records  []  Other:           Assessment / Plan       Active Hospital Problems:  Active Hospital Problems    Diagnosis    • Cellulitis and abscess of foot    • Lactic acidosis    • Essential hypertension    • Diabetic ulcer of right midfoot associated with type 2 DM    • Diabetic ulcer of left heel associated with type 2 DM        Plan:   Stable.  Pain control with narcotics.  Continue vacuum pump  Labs in a.m.  Waiting for placement       DVT prophylaxis:  Medical DVT prophylaxis orders are present.    CODE STATUS:   Code Status (Patient has no pulse and is not breathing): CPR (Attempt to Resuscitate)  Medical Interventions (Patient has pulse or is breathing): Full Support                Electronically signed by Yung Grijalva MD, 21, 12:28 PM EST.

## 2021-12-17 NOTE — PROGRESS NOTES
[facility]     Progress Note    Patient Name: Jose Shaikh  : 1960  MRN: [id number]  Primary Care Physician:  Corazon Gr MD  Date of admission: 2021      Subjective   Brief summary.  Patient admitted with cellulitis of the foot status post amputation      HPI:  Follow-up on cellulitis of the foot.  No new issues, sleepy  Feeling better   No fever chills    Review of Systems     Complains of fatigue  Pain tolerable.  No nausea/ Vomitting  BS stable.      Objective     Vitals:   Temp:  [98 °F (36.7 °C)-98.9 °F (37.2 °C)] 98.2 °F (36.8 °C)  Heart Rate:  [62-67] 64  Resp:  [16-20] 16  BP: ()/(50-65) 120/64    Physical Exam :     Morbidly obese male not in acute distress.  Heart regular.  Lungs clear.  Abdomen obese and soft.  Extremity with no significant edema, right foot with wound VAC      Result Review:  I have personally reviewed the results from the time of this admission to 2021 12:26 EST and agree with these findings:  [x]  Laboratory  []  Microbiology  []  Radiology  []  EKG/Telemetry   []  Cardiology/Vascular   []  Pathology  []  Old records  []  Other:           Assessment / Plan       Active Hospital Problems:  Active Hospital Problems    Diagnosis    • Cellulitis and abscess of foot    • Lactic acidosis    • Essential hypertension    • Diabetic ulcer of right midfoot associated with type 2 DM    • Diabetic ulcer of left heel associated with type 2 DM        Plan:   Stable.  Pain control with narcotics.  Continue vacuum pump  Waiting for placement       DVT prophylaxis:  Medical DVT prophylaxis orders are present.    CODE STATUS:   Code Status (Patient has no pulse and is not breathing): CPR (Attempt to Resuscitate)  Medical Interventions (Patient has pulse or is breathing): Full Support              Electronically signed by Yung Grijalva MD, 21, 6:19 PM EST.

## 2021-12-17 NOTE — SIGNIFICANT NOTE
12/17/21 1440   Wound 12/02/21 Right anterior foot Incision   Placement Date: 12/02/21   Side: Right  Orientation: anterior  Location: foot  Primary Wound Type: Incision   Wound Image     Dressing Appearance intact; dry; moist drainage   Base red; moist; bleeding; exposed structure   Red (%), Wound Tissue Color 75   Yellow (%), Wound Tissue Color 25   Periwound pink; blanchable; intact; warm   Periwound Temperature warm   Periwound Skin Turgor soft   Edges open; irregular   Wound Length (cm) 5.9 cm   Wound Width (cm) 14 cm   Wound Depth (cm) 3.3 cm   Drainage Characteristics/Odor sanguineous; bleeding controlled   Drainage Amount small   Care, Wound cleansed with; irrigated with; sterile normal saline; negative pressure wound therapy   Dressing Care dressing applied; skin barrier agent applied; transparent film; silver impregnated; foam   Periwound Care barrier film applied   NPWT (Negative Pressure Wound Therapy) 12/13/21 1200 Right anterior foot   Placement Date/Time: 12/13/21 1200   Location: Right anterior foot   Therapy Setting continuous therapy   Dressing foam, black   Contact Layer other (see comments)  (low- adherant Versatil)   Pressure Setting 125 mmHg   Sponges Inserted 1   Sponges Removed 3   Finger sweep complete Yes     Wound follow-up/Vac follow-up: Primary RN provided pain medication to pain for the dressing change of the wound vac. Switched patient to regular vac therapy, drainage minimal serosanguineous in cannister upon assessment. Removed 3 sponges from wound bed. Cleansed and irrigated with copious amount of normal saline and gauze. Minimal amount of sanguineous drainage noted, controlled with application of saline and gauze. Plantar flap remains dusky, podiatry aware. Wound bed is moist, beefy red; exposed structure visible. Placed low adherent gauze to wound bed and covered with 1 black foam. Periwound is pink and intact, continue to improve. Patient states feeling much better. Applied  skin prep to periwound covered with anon adherent gauze and silver PolyMem foam. Covered with transparent drape; foam compressed, seal obtained and no alarms noted. Applied dry gauze to bolster vac line from skin and wrapped with gauze roll. Left foot dressing remains clean dry and intact. Patient tolerated vac dressing change well with no complaints. Karon Bolton RN

## 2021-12-17 NOTE — PROGRESS NOTES
[facility] - PODIATRY    Today's Date: 12/17/21    Patient Name: Jose Shaikh  MRN: [id number]  CSN: [id number]  PCP: Corazon Gr MD  Referring Provider: Yung Grijalva MD  Attending Provider: Yung Grijalva MD  Length of Stay: 16    SUBJECTIVE   Chief Complaint: Right foot osteomyelitis    HPI: Jose Shaikh, a 61 y.o.male,     Procedure: I&D of right midfoot  Date: 10 December 2021    Procedure: Right midfoot amputation  Date: 2 December 2021    Patient states that his ulcers in both feet lower legs are feeling better today.    Patient states that he is looking forward to rehabilitation placement.    Patient denies any fevers, chills, nausea, vomiting, shortness of breathe, nor any other constitutional signs nor symptoms.      Past Medical History:   Diagnosis Date   • Absence of toe of right foot    • Acute osteomyelitis of left calcaneus  8/18/2021   • Anxiety and depression    • Arthritis    • Claustrophobia    • Corns and callus    • Diabetic ulcer of left heel associated with type 2 DM 8/18/2021   • Diabetic ulcer of left heel associated with type 2 DM 7/6/2021   • Diabetic ulcer of right midfoot associated with type 2 DM 8/18/2021   • Difficulty walking    • Essential hypertension 8/31/2021   • Hammertoe    • Hyperlipidemia LDL goal <100 8/31/2021   • Ingrown toenail    • Obesity    • Paroxysmal atrial fibrillation 8/31/2021   • Polyneuropathy    • Pressure ulcer, stage 1    • Tinea unguium    • Type 2 diabetes mellitus with polyneuropathy      Past Surgical History:   Procedure Laterality Date   • CYST REMOVAL      center of back; benign   • INCISION AND DRAINAGE ABSCESS      back   • INCISION AND DRAINAGE LEG Right 12/10/2021    Procedure: INCISION AND DRAINAGE LOWER EXTREMITY;  Surgeon: Ash Leyva DPM;  Location: [facility] MAIN OR;  Service: Podiatry;  Laterality: Right;   • OTHER SURGICAL HISTORY      Surgical clips left foot   • TOE SURGERY Right     Removal of 5th toe   •  TRANS METATARSAL AMPUTATION Right 2021    Procedure: AMPUTATION TRANS METATARSAL;  Surgeon: Ash Leyva DPM;  Location: [facility] MAIN OR;  Service: Podiatry;  Laterality: Right;   • WRIST SURGERY Left     repair of injury     Family History   Problem Relation Age of Onset   • Heart disease Mother    • Heart disease Father    • Cancer Father         Unspecified   • Heart disease Brother      Social History     Socioeconomic History   • Marital status: Single   Tobacco Use   • Smoking status: Former Smoker     Packs/day: 0.00     Years: 1.00     Pack years: 0.00     Quit date: 1991     Years since quittin.3   • Smokeless tobacco: Never Used   • Tobacco comment: quit at age 30   Vaping Use   • Vaping Use: Never used   Substance and Sexual Activity   • Alcohol use: Yes     Comment: Rare   • Drug use: Yes     Types: Marijuana     Comment: Daily   • Sexual activity: Defer     Allergies   Allergen Reactions   • Adhesive Tape Rash     Current Facility-Administered Medications   Medication Dose Route Frequency Provider Last Rate Last Admin   • acetaminophen (TYLENOL) suppository 650 mg  650 mg Rectal Q4H PRN Ash Leyva DPM       • acetaminophen (TYLENOL) tablet 650 mg  650 mg Oral Q4H PRN Ash Leyva DPM   650 mg at 21   • aluminum-magnesium hydroxide-simethicone (MAALOX MAX) 400-400-40 MG/5ML suspension 15 mL  15 mL Oral Q6H PRN Ash Leyva DPM       • apixaban (ELIQUIS) tablet 5 mg  5 mg Oral Q12H Ash Leyva DPM   5 mg at 21   • atorvastatin (LIPITOR) tablet 10 mg  10 mg Oral Nightly Ash Leyva DPM   10 mg at 21   • sennosides-docusate (PERICOLACE) 8.6-50 MG per tablet 1 tablet  1 tablet Oral BID Ash Leyva DPM   1 tablet at 21    And   • polyethylene glycol (MIRALAX) packet 17 g  17 g Oral Daily PRN Ash Leyva DPM        And   • bisacodyl (DULCOLAX) EC tablet 5 mg  5  mg Oral Daily PRN Ash Leyva DPM        And   • bisacodyl (DULCOLAX) suppository 10 mg  10 mg Rectal Daily PRN Ash Leyva DPM       • buPROPion XL (WELLBUTRIN XL) 24 hr tablet 150 mg  150 mg Oral BID Ash Leyva DPM   150 mg at 12/17/21 0805   • citalopram (CeleXA) tablet 10 mg  10 mg Oral Daily Ash Leyva DPM   10 mg at 12/16/21 2023   • dextrose (GLUTOSE) oral gel 15 g  15 g Oral Q15 Min PRN Ash Leyva DPM       • dextrose 10 % infusion  25 g Intravenous Q15 Min PRN Ash Leyva DPM       • diphenhydrAMINE (BENADRYL) capsule 25 mg  25 mg Oral Q6H PRN Yung Grijalva MD   25 mg at 12/14/21 1614   • famotidine (PEPCID) tablet 40 mg  40 mg Oral Daily Ash Leyva DPM   40 mg at 12/17/21 0805   • glucagon (human recombinant) (GLUCAGEN DIAGNOSTIC) injection 1 mg  1 mg Subcutaneous Q15 Min PRN Ash Leyva DPM       • HYDROcodone-acetaminophen (NORCO)  MG per tablet 1 tablet  1 tablet Oral Q4H PRN Ash Leyva DPM   1 tablet at 12/17/21 1040   • HYDROcodone-acetaminophen (NORCO) 5-325 MG per tablet 1 tablet  1 tablet Oral Q4H PRN Ash Leyva DPM   1 tablet at 12/13/21 0533   • insulin detemir (LEVEMIR) injection 25 Units  25 Units Subcutaneous Nightly Ash Lyeva DPM   25 Units at 12/16/21 2150   • insulin lispro (humaLOG) injection 0-9 Units  0-9 Units Subcutaneous 4x Daily With Meals & Nightly Ash Leyva DPM   2 Units at 12/17/21 1141   • lactated ringers infusion  9 mL/hr Intravenous Continuous PRN Ash Leyva DPM       • lidocaine (XYLOCAINE) 1 % injection  - ADS Override Pull            • lisinopril (PRINIVIL,ZESTRIL) tablet 20 mg  20 mg Oral Daily Ash Leyva DPM   20 mg at 12/15/21 0844   • loperamide (IMODIUM) capsule 2 mg  2 mg Oral Q4H PRN Ash Leyva DPM       • metoprolol succinate XL (TOPROL-XL) 24 hr tablet 25 mg  25 mg Oral  Q24H Ash Leyva DPM   25 mg at 12/15/21 0843   • naloxone (NARCAN) injection 0.4 mg  0.4 mg Intravenous Q5 Min PRN Ash Leyva DPM       • ondansetron (ZOFRAN) tablet 4 mg  4 mg Oral Q6H PRN Ash Leyva DPM        Or   • ondansetron (ZOFRAN) injection 4 mg  4 mg Intravenous Q6H PRN Ash Leyva DPM       • Pharmacy to Dose Zosyn   Does not apply Continuous PRN Yung Grijalva MD       • piperacillin-tazobactam (ZOSYN) 4.5 g/100 mL 0.9% NS IVPB (mbp)  4.5 g Intravenous Q8H Yung Grijalva MD 0 mL/hr at 12/13/21 1750 4.5 g at 12/17/21 0614   • pregabalin (LYRICA) capsule 25 mg  25 mg Oral Q12H Ash Leyva DPM   25 mg at 12/17/21 0805   • sodium chloride 0.9 % flush 10 mL  10 mL Intravenous Q12H Ash Leyva DPM   10 mL at 12/16/21 2025   • sodium chloride 0.9 % flush 10 mL  10 mL Intravenous PRN Ash Leyva DPM       • tamsulosin (FLOMAX) 24 hr capsule 0.4 mg  0.4 mg Oral Nightly Ash Leyva DPM   0.4 mg at 12/16/21 2023     Review of Systems   Constitutional: Negative.    Skin:        Bilateral foot ulcers.     All other systems reviewed and are negative.      OBJECTIVE     Vitals:    12/17/21 1100   BP: 120/64   Pulse: 64   Resp: 16   Temp: 98.2 °F (36.8 °C)   SpO2: 96%       PHYSICAL EXAM    GEN:   A&Ox3, NAD. Pt presents in hospital bed.     Neurovascular status unchanged    Ortho: Right midfoot amputation    Right foot: VeroFlo VAC Dressing intact with no leaks.  Improvement in edema and erythema seen in midfoot medially proximal to the VeroFlo VAC dressing.      Left plantar heel: Heel shows stage III ulceration.  Healthy granular base.  No surrounding edema no erythema seen.    LABORATORY/CULTURE RESULTS:  Results from last 7 days   Lab Units 12/16/21  0610 12/14/21  0513   WBC 10*3/mm3 5.89 6.37   HEMOGLOBIN g/dL 11.2* 11.7*   HEMATOCRIT % 34.8* 35.8*   PLATELETS 10*3/mm3 132* 141     Results from last 7 days   Lab Units  12/16/21  0610 12/14/21  0513   SODIUM mmol/L 134* 135*   POTASSIUM mmol/L 4.5 4.7   CHLORIDE mmol/L 99 101   CO2 mmol/L 27.0 29.1*   BUN mg/dL 12 14   CREATININE mg/dL 0.77 0.91   CALCIUM mg/dL 8.4* 8.9   BILIRUBIN mg/dL 0.6  --    ALK PHOS U/L 437*  --    ALT (SGPT) U/L 36  --    AST (SGOT) U/L 47*  --    GLUCOSE mg/dL 175* 155*         Microbiology Results (last 10 days)     Procedure Component Value - Date/Time    Anaerobic Culture - Wound, Foot, Right [[id number]]  (Normal) Collected: 12/10/21 1835    Lab Status: Final result Specimen: Wound from Foot, Right Updated: 12/15/21 0711     Anaerobic Culture No anaerobes isolated at 5 days    Wound Culture - Wound, Foot, Right [[id number]]  (Abnormal) Collected: 12/10/21 1835    Lab Status: Final result Specimen: Wound from Foot, Right Updated: 12/12/21 0950     Wound Culture Moderate growth (3+) Corynebacterium species     Comment: No definitive guidelines. All are susceptible to vancomycin. Resistance to penicillins & cephalosporins does occur.        Gram Stain Moderate (3+) WBCs seen      Rare (1+) Gram negative bacilli, tiny           ASSESSMENT/PLAN     Patient Active Problem List   Diagnosis   • Diabetic ulcer of left heel associated with type 2 DM   • Acute osteomyelitis of left calcaneus    • Diabetic ulcer of left heel associated with type 2 DM   • Diabetic ulcer of right midfoot associated with type 2 DM   • Paroxysmal atrial fibrillation   • Essential hypertension   • Hyperlipidemia LDL goal <100   • Cellulitis and abscess of foot   • Lactic acidosis       Comprehensive lower extremity examination and evaluation was performed.    Continue current wound care orders.      Recommend rehabilitation placement.    Recommend follow-up with Dr. Shea Daniels at the wound care center after discharge from hospital.          Lab Frequency Next Occurrence   Lipid Panel Once 02/27/2022   WOUND CARE HYPERBARIC weekdays        This document has been electronically signed  by Ash Leyva DPM on December 17, 2021 12:19 EST

## 2021-12-17 NOTE — PLAN OF CARE
Goal Outcome Evaluation:  Plan of Care Reviewed With: patient        Progress: improving  Outcome Summary: patient alert and oriented. prn pain meds given. wound vac without complications (complications) wound care changed dressing to bilateral lower extremities. He shifts  weight and is independent with bed mobility. Max x 2 assist to bedside commode.

## 2021-12-17 NOTE — PLAN OF CARE
Goal Outcome Evaluation:  Plan of Care Reviewed With: patient     Dressing changed today on both lower extremities, next change due 12/20, wound vac still in use on right foot,  working on placement for pt, VSS stable, held blood pressure meds today, pt only required pain meds twice today,

## 2021-12-18 LAB
ALBUMIN SERPL-MCNC: 2.8 G/DL (ref 3.5–5.2)
ALBUMIN/GLOB SERPL: 0.8 G/DL
ALP SERPL-CCNC: 563 U/L (ref 39–117)
ALT SERPL W P-5'-P-CCNC: 60 U/L (ref 1–41)
ANION GAP SERPL CALCULATED.3IONS-SCNC: 6.8 MMOL/L (ref 5–15)
AST SERPL-CCNC: 75 U/L (ref 1–40)
BASOPHILS # BLD AUTO: 0.02 10*3/MM3 (ref 0–0.2)
BASOPHILS NFR BLD AUTO: 0.4 % (ref 0–1.5)
BILIRUB SERPL-MCNC: 0.6 MG/DL (ref 0–1.2)
BUN SERPL-MCNC: 10 MG/DL (ref 8–23)
BUN/CREAT SERPL: 15.2 (ref 7–25)
CALCIUM SPEC-SCNC: 8.8 MG/DL (ref 8.6–10.5)
CHLORIDE SERPL-SCNC: 102 MMOL/L (ref 98–107)
CO2 SERPL-SCNC: 27.2 MMOL/L (ref 22–29)
CREAT SERPL-MCNC: 0.66 MG/DL (ref 0.76–1.27)
DEPRECATED RDW RBC AUTO: 44.2 FL (ref 37–54)
EOSINOPHIL # BLD AUTO: 0.13 10*3/MM3 (ref 0–0.4)
EOSINOPHIL NFR BLD AUTO: 2.4 % (ref 0.3–6.2)
ERYTHROCYTE [DISTWIDTH] IN BLOOD BY AUTOMATED COUNT: 14 % (ref 12.3–15.4)
GFR SERPL CREATININE-BSD FRML MDRD: 122 ML/MIN/1.73
GLOBULIN UR ELPH-MCNC: 3.7 GM/DL
GLUCOSE BLDC GLUCOMTR-MCNC: 129 MG/DL (ref 70–99)
GLUCOSE BLDC GLUCOMTR-MCNC: 178 MG/DL (ref 70–99)
GLUCOSE BLDC GLUCOMTR-MCNC: 201 MG/DL (ref 70–99)
GLUCOSE BLDC GLUCOMTR-MCNC: 305 MG/DL (ref 70–99)
GLUCOSE SERPL-MCNC: 135 MG/DL (ref 65–99)
HCT VFR BLD AUTO: 36.7 % (ref 37.5–51)
HGB BLD-MCNC: 11.9 G/DL (ref 13–17.7)
IMM GRANULOCYTES # BLD AUTO: 0.02 10*3/MM3 (ref 0–0.05)
IMM GRANULOCYTES NFR BLD AUTO: 0.4 % (ref 0–0.5)
LYMPHOCYTES # BLD AUTO: 0.85 10*3/MM3 (ref 0.7–3.1)
LYMPHOCYTES NFR BLD AUTO: 15.9 % (ref 19.6–45.3)
MCH RBC QN AUTO: 28.1 PG (ref 26.6–33)
MCHC RBC AUTO-ENTMCNC: 32.4 G/DL (ref 31.5–35.7)
MCV RBC AUTO: 86.6 FL (ref 79–97)
MONOCYTES # BLD AUTO: 0.51 10*3/MM3 (ref 0.1–0.9)
MONOCYTES NFR BLD AUTO: 9.5 % (ref 5–12)
NEUTROPHILS NFR BLD AUTO: 3.82 10*3/MM3 (ref 1.7–7)
NEUTROPHILS NFR BLD AUTO: 71.4 % (ref 42.7–76)
NRBC BLD AUTO-RTO: 0 /100 WBC (ref 0–0.2)
PLATELET # BLD AUTO: 150 10*3/MM3 (ref 140–450)
PMV BLD AUTO: 10.7 FL (ref 6–12)
POTASSIUM SERPL-SCNC: 4.2 MMOL/L (ref 3.5–5.2)
PROT SERPL-MCNC: 6.5 G/DL (ref 6–8.5)
RBC # BLD AUTO: 4.24 10*6/MM3 (ref 4.14–5.8)
SODIUM SERPL-SCNC: 136 MMOL/L (ref 136–145)
WBC NRBC COR # BLD: 5.35 10*3/MM3 (ref 3.4–10.8)

## 2021-12-18 PROCEDURE — 63710000001 INSULIN LISPRO (HUMAN) PER 5 UNITS: Performed by: PODIATRIST

## 2021-12-18 PROCEDURE — 94799 UNLISTED PULMONARY SVC/PX: CPT

## 2021-12-18 PROCEDURE — 80053 COMPREHEN METABOLIC PANEL: CPT | Performed by: INTERNAL MEDICINE

## 2021-12-18 PROCEDURE — 97110 THERAPEUTIC EXERCISES: CPT

## 2021-12-18 PROCEDURE — 85025 COMPLETE CBC W/AUTO DIFF WBC: CPT | Performed by: INTERNAL MEDICINE

## 2021-12-18 PROCEDURE — 25010000002 MORPHINE PER 10 MG: Performed by: INTERNAL MEDICINE

## 2021-12-18 PROCEDURE — 82962 GLUCOSE BLOOD TEST: CPT

## 2021-12-18 PROCEDURE — 63710000001 INSULIN DETEMIR PER 5 UNITS: Performed by: INTERNAL MEDICINE

## 2021-12-18 PROCEDURE — 94761 N-INVAS EAR/PLS OXIMETRY MLT: CPT

## 2021-12-18 PROCEDURE — 25010000002 PIPERACILLIN SOD-TAZOBACTAM PER 1 G: Performed by: INTERNAL MEDICINE

## 2021-12-18 RX ORDER — MORPHINE SULFATE 2 MG/ML
2 INJECTION, SOLUTION INTRAMUSCULAR; INTRAVENOUS EVERY 6 HOURS PRN
Status: DISCONTINUED | OUTPATIENT
Start: 2021-12-18 | End: 2021-12-21 | Stop reason: HOSPADM

## 2021-12-18 RX ORDER — HYDROCODONE BITARTRATE AND ACETAMINOPHEN 10; 325 MG/1; MG/1
1 TABLET ORAL EVERY 6 HOURS PRN
Status: DISCONTINUED | OUTPATIENT
Start: 2021-12-18 | End: 2021-12-21 | Stop reason: HOSPADM

## 2021-12-18 RX ADMIN — MORPHINE SULFATE 2 MG: 2 INJECTION, SOLUTION INTRAMUSCULAR; INTRAVENOUS at 05:31

## 2021-12-18 RX ADMIN — PREGABALIN 25 MG: 25 CAPSULE ORAL at 20:08

## 2021-12-18 RX ADMIN — APIXABAN 5 MG: 5 TABLET, FILM COATED ORAL at 20:08

## 2021-12-18 RX ADMIN — INSULIN LISPRO 2 UNITS: 100 INJECTION, SOLUTION INTRAVENOUS; SUBCUTANEOUS at 17:47

## 2021-12-18 RX ADMIN — PIPERACILLIN SODIUM AND TAZOBACTAM SODIUM 4.5 G: 4; .5 INJECTION, POWDER, LYOPHILIZED, FOR SOLUTION INTRAVENOUS at 05:24

## 2021-12-18 RX ADMIN — METOPROLOL SUCCINATE 25 MG: 25 TABLET, EXTENDED RELEASE ORAL at 07:53

## 2021-12-18 RX ADMIN — BUPROPION HYDROCHLORIDE 150 MG: 150 TABLET, EXTENDED RELEASE ORAL at 07:52

## 2021-12-18 RX ADMIN — SODIUM CHLORIDE, PRESERVATIVE FREE 10 ML: 5 INJECTION INTRAVENOUS at 20:09

## 2021-12-18 RX ADMIN — INSULIN LISPRO 4 UNITS: 100 INJECTION, SOLUTION INTRAVENOUS; SUBCUTANEOUS at 12:03

## 2021-12-18 RX ADMIN — TAMSULOSIN HYDROCHLORIDE 0.4 MG: 0.4 CAPSULE ORAL at 20:08

## 2021-12-18 RX ADMIN — MORPHINE SULFATE 2 MG: 2 INJECTION, SOLUTION INTRAMUSCULAR; INTRAVENOUS at 07:54

## 2021-12-18 RX ADMIN — LISINOPRIL 20 MG: 20 TABLET ORAL at 07:52

## 2021-12-18 RX ADMIN — INSULIN LISPRO 7 UNITS: 100 INJECTION, SOLUTION INTRAVENOUS; SUBCUTANEOUS at 20:55

## 2021-12-18 RX ADMIN — APIXABAN 5 MG: 5 TABLET, FILM COATED ORAL at 07:52

## 2021-12-18 RX ADMIN — BUPROPION HYDROCHLORIDE 150 MG: 150 TABLET, EXTENDED RELEASE ORAL at 20:08

## 2021-12-18 RX ADMIN — CITALOPRAM HYDROBROMIDE 10 MG: 20 TABLET ORAL at 20:08

## 2021-12-18 RX ADMIN — INSULIN DETEMIR 40 UNITS: 100 INJECTION, SOLUTION SUBCUTANEOUS at 20:55

## 2021-12-18 RX ADMIN — SODIUM CHLORIDE, PRESERVATIVE FREE 10 ML: 5 INJECTION INTRAVENOUS at 09:00

## 2021-12-18 RX ADMIN — ATORVASTATIN CALCIUM 10 MG: 10 TABLET, FILM COATED ORAL at 20:08

## 2021-12-18 RX ADMIN — HYDROCODONE BITARTRATE AND ACETAMINOPHEN 1 TABLET: 10; 325 TABLET ORAL at 17:47

## 2021-12-18 RX ADMIN — PREGABALIN 25 MG: 25 CAPSULE ORAL at 07:52

## 2021-12-18 RX ADMIN — FAMOTIDINE 40 MG: 20 TABLET ORAL at 07:53

## 2021-12-18 RX ADMIN — MORPHINE SULFATE 2 MG: 2 INJECTION, SOLUTION INTRAMUSCULAR; INTRAVENOUS at 23:57

## 2021-12-18 NOTE — PROGRESS NOTES
[facility]     Progress Note    Patient Name: Jose Shaikh  : 1960  MRN: [id number]  Primary Care Physician:  Corazon Gr MD  Date of admission: 2021      Subjective   Brief summary.  Patient admitted with cellulitis of the foot status post amputation      HPI:  Follow-up on cellulitis of the foot.  RN reports patient taking IV morphine, no oral pain meds.  Also sugars running little bit high patient concerned about it.  No fever  Labs with high alk phos    Review of Systems   No fever chills  Complains of fatigue  Pain tolerable.  No nausea/ Vomitting  No abdominal pain  BS high last 24 hours      Objective     Vitals:   Temp:  [97.8 °F (36.6 °C)-98.8 °F (37.1 °C)] 98.6 °F (37 °C)  Heart Rate:  [64-68] 64  Resp:  [18] 18  BP: (101-135)/(56-68) 108/56    Physical Exam :     Morbidly obese male not in acute distress.  HEENT unremarkable  Heart regular.  Lungs clear.  Abdomen obese and soft.  Extremity with no significant edema, right foot with wound VAC      Result Review:  I have personally reviewed the results from the time of this admission to 2021 12:03 EST and agree with these findings:  [x]  Laboratory  []  Microbiology  []  Radiology  []  EKG/Telemetry   []  Cardiology/Vascular   []  Pathology  []  Old records  []  Other:           Assessment / Plan       Active Hospital Problems:  Active Hospital Problems    Diagnosis    • Cellulitis and abscess of foot    • Lactic acidosis    • Essential hypertension    • Diabetic ulcer of right midfoot associated with type 2 DM    • Diabetic ulcer of left heel associated with type 2 DM        Plan:   Stable.  Will start oral pain meds.  Space morphine every 6 hours as needed.  Increase insulin.  Finish antibiotics to auto stop  Continue vacuum pump  Labs in a.m.  Discussed with patient and sister at bedside.  Will get ultrasound right upper quadrant for high alk phos  RN notified  Waiting for placement       DVT prophylaxis:  Medical DVT  prophylaxis orders are present.    CODE STATUS:   Code Status (Patient has no pulse and is not breathing): CPR (Attempt to Resuscitate)  Medical Interventions (Patient has pulse or is breathing): Full Support                Electronically signed by Yung Grijalva MD, 12/17/21, 12:28 PM EST.

## 2021-12-18 NOTE — THERAPY TREATMENT NOTE
Acute Care - Physical Therapy Treatment Note   [facility]     Patient Name: Jose Shaikh  : 1960  MRN: [id number]  Today's Date: 2021      Visit Dx:     ICD-10-CM ICD-9-CM   1. Diabetic ulcer of right midfoot associated with type 2 DM  E11.621 250.80    L97.415 707.14   2. Diabetic ulcer of right midfoot associated with type 2 diabetes mellitus, with muscle involvement without evidence of necrosis (HCC)  E11.621 250.80    L97.415 707.14   3. Difficulty walking  R26.2 719.7   4. Cellulitis and abscess of foot  L03.119 682.7    L02.619      Patient Active Problem List   Diagnosis   • Diabetic ulcer of left heel associated with type 2 DM   • Acute osteomyelitis of left calcaneus    • Diabetic ulcer of left heel associated with type 2 DM   • Diabetic ulcer of right midfoot associated with type 2 DM   • Paroxysmal atrial fibrillation   • Essential hypertension   • Hyperlipidemia LDL goal <100   • Cellulitis and abscess of foot   • Lactic acidosis     Past Medical History:   Diagnosis Date   • Absence of toe of right foot    • Acute osteomyelitis of left calcaneus  2021   • Anxiety and depression    • Arthritis    • Claustrophobia    • Corns and callus    • Diabetic ulcer of left heel associated with type 2 DM 2021   • Diabetic ulcer of left heel associated with type 2 DM 2021   • Diabetic ulcer of right midfoot associated with type 2 DM 2021   • Difficulty walking    • Essential hypertension 2021   • Hammertoe    • Hyperlipidemia LDL goal <100 2021   • Ingrown toenail    • Obesity    • Paroxysmal atrial fibrillation 2021   • Polyneuropathy    • Pressure ulcer, stage 1    • Tinea unguium    • Type 2 diabetes mellitus with polyneuropathy      Past Surgical History:   Procedure Laterality Date   • CYST REMOVAL      center of back; benign   • INCISION AND DRAINAGE ABSCESS      back   • INCISION AND DRAINAGE LEG Right 12/10/2021    Procedure: INCISION AND DRAINAGE LOWER  EXTREMITY;  Surgeon: Ash Leyva DPM;  Location: [facility] MAIN OR;  Service: Podiatry;  Laterality: Right;   • OTHER SURGICAL HISTORY      Surgical clips left foot   • TOE SURGERY Right     Removal of 5th toe   • TRANS METATARSAL AMPUTATION Right 12/2/2021    Procedure: AMPUTATION TRANS METATARSAL;  Surgeon: Ash Leyva DPM;  Location: [facility] MAIN OR;  Service: Podiatry;  Laterality: Right;   • WRIST SURGERY Left     repair of injury     PT Assessment (last 12 hours)     PT Evaluation and Treatment     Row Name 12/18/21 1153          Physical Therapy Time and Intention    Subjective Information complains of; fatigue  -DK     Document Type therapy note (daily note)  -DK     Mode of Treatment individual therapy; physical therapy  -DK     Patient Effort good  -DK     Symptoms Noted During/After Treatment fatigue  -DK     Comment --  Wound vac on right foot.  Pt is NWB right leg.  -     Row Name 12/18/21 1153          Cognition    Affect/Mental Status (Cognitive) WFL  -DK     Orientation Status (Cognition) oriented x 4  -DK     Follows Commands (Cognition) WFL  -DK     Cognitive Function (Cognitive) WFL  -DK     Personal Safety Interventions gait belt; nonskid shoes/slippers when out of bed; supervised activity  -     Row Name 12/18/21 1153          Pain Scale: Numbers Pre/Post-Treatment    Pretreatment Pain Rating 0/10 - no pain  -DK     Posttreatment Pain Rating 0/10 - no pain  -     Row Name 12/18/21 1153          Motor Skills    Motor Skills therapeutic exercise  -     Therapeutic Exercise hip; knee; ankle  -DK     Additional Documentation --  No transfers due to weight bearing restrictions and wound vac.  -     Row Name 12/18/21 1153          Hip (Therapeutic Exercise)    Hip (Therapeutic Exercise) AROM (active range of motion)  -     Hip AROM (Therapeutic Exercise) bilateral; flexion; extension; aBduction; aDduction; supine; 10 repetitions  2 sets  -     Row Name 12/18/21 1151           Knee (Therapeutic Exercise)    Knee (Therapeutic Exercise) AROM (active range of motion)  -DK     Knee AROM (Therapeutic Exercise) bilateral; flexion; extension; SLR (straight leg raise); heel slides; supine; 10 repetitions; 2 sets  -DK     Row Name 12/18/21 1153          Ankle (Therapeutic Exercise)    Ankle (Therapeutic Exercise) AROM (active range of motion)  -     Ankle AROM (Therapeutic Exercise) bilateral; dorsiflexion; plantarflexion; supine; 10 repetitions; 2 sets  -DK     Row Name             Wound 06/22/21 1133 Left lateral heel    Wound - Properties Group Placement Date: 06/22/21  -FABIOLA Placement Time: 1133  -FABIOLA Present on Hospital Admission: Y  -FABIOLA Side: Left  -FABIOLA Orientation: lateral  -FABIOLA Location: heel  -FABIOLA     Retired Wound - Properties Group Date first assessed: 06/22/21  -FABIOLA Time first assessed: 1133  -FABIOLA Present on Hospital Admission: Y  -FABIOLA Side: Left  -FABIOLA Location: heel  -FABIOLA     Row Name             Wound 12/02/21 Right anterior foot Incision    Wound - Properties Group Placement Date: 12/02/21  -ES Side: Right  -ES Orientation: anterior  -ES Location: foot  -ES Primary Wound Type: Incision  -ES     Retired Wound - Properties Group Date first assessed: 12/02/21  -ES Side: Right  -ES Location: foot  -ES Primary Wound Type: Incision  -ES     Row Name             NPWT (Negative Pressure Wound Therapy) 12/13/21 1200 Right anterior foot    NPWT (Negative Pressure Wound Therapy) - Properties Group Placement Date: 12/13/21  -RASHARD Placement Time: 1200  -RASHARD Location: Right anterior foot  -RASHARD     Retired NPWT (Negative Pressure Wound Therapy) - Properties Group Placement Date: 12/13/21  -RASHARD Placement Time: 1200  -RASHARD Location: Right anterior foot  -RASHARD     Row Name 12/18/21 1153          Plan of Care Review    Plan of Care Reviewed With patient  -DK     Progress no change  -DK     Row Name 12/18/21 1153          Positioning and Restraints    Pre-Treatment Position in bed  -DK     Post Treatment  Position bed  -DK     In Bed supine; call light within reach; encouraged to call for assist; exit alarm on; side rails up x2; legs elevated; R heel elevated  -DK     Row Name 12/18/21 1153          Therapy Assessment/Plan (PT)    Rehab Potential (PT) good, to achieve stated therapy goals  -DK     Criteria for Skilled Interventions Met (PT) skilled treatment is necessary  -DK     Problem List (PT) problems related to; cognition; mobility; strength; pain  -DK     Activity Limitations Related to Problem List (PT) unable to ambulate safely; unable to transfer safely  -DK     Row Name 12/18/21 1153          Progress Summary (PT)    Progress Toward Functional Goals (PT) progress toward functional goals is good  -DK           User Key  (r) = Recorded By, (t) = Taken By, (c) = Cosigned By    Initials Name Provider Type    Karon Jordan, RN Registered Nurse    Mckenna Landaverde PTA Physical Therapy Assistant    Marlen Vaughn RN Registered Nurse    Katlyn Garcia RN Registered Nurse                Physical Therapy Education                 Title: PT OT SLP Therapies (Done)     Topic: Physical Therapy (Done)     Point: Mobility training (Done)     Learning Progress Summary           Patient Acceptance, E, VU,NR by  at 12/14/2021 1605      Show all documentation for this point (4)                 Point: Home exercise program (Done)     Learning Progress Summary           Patient Acceptance, E,TB, VU by  at 12/14/2021 1529      Show all documentation for this point (3)                 Point: Body mechanics (Done)     Learning Progress Summary           Patient Acceptance, E,TB, VU by  at 12/14/2021 1529      Show all documentation for this point (3)                 Point: Precautions (Done)     Learning Progress Summary           Patient Acceptance, E, VU,NR by  at 12/14/2021 1605      Show all documentation for this point (4)                             User Key     Initials Effective Dates Name  Provider Type Discipline     04/25/21 -  Kim Esparza, PT Physical Therapist PT    AW 06/21/21 -  Katy Esparza, RN Registered Nurse Nurse              PT Recommendation and Plan  Planned Therapy Interventions (PT): balance training, bed mobility training, gait training, strengthening, transfer training, home exercise program  Therapy Frequency (PT): daily  Progress Summary (PT)  Progress Toward Functional Goals (PT): progress toward functional goals is good  Plan of Care Reviewed With: patient  Progress: no change   Outcome Measures     Row Name 12/18/21 1153             How much help from another person do you currently need...    Turning from your back to your side while in flat bed without using bedrails? 3  -DK      Moving from lying on back to sitting on the side of a flat bed without bedrails? 2  -DK      Moving to and from a bed to a chair (including a wheelchair)? 2  -DK      Standing up from a chair using your arms (e.g., wheelchair, bedside chair)? 2  -DK      Climbing 3-5 steps with a railing? 1  -DK      To walk in hospital room? 1  -DK      AM-PAC 6 Clicks Score (PT) 11  -DK              Functional Assessment    Outcome Measure Options AM-PAC 6 Clicks Basic Mobility (PT)  -DK            User Key  (r) = Recorded By, (t) = Taken By, (c) = Cosigned By    Initials Name Provider Type    Mckenna Landaverde PTA Physical Therapy Assistant                 Time Calculation:    PT Charges     Row Name 12/18/21 1156             Time Calculation    PT Received On 12/18/21  -DK      PT Goal Re-Cert Due Date 12/23/21  -DK              Timed Charges    70859 - PT Therapeutic Exercise Minutes 14  -DK      11249 - PT Therapeutic Activity Minutes 3  -DK              Total Minutes    Timed Charges Total Minutes 17  -DK       Total Minutes 17  -DK            User Key  (r) = Recorded By, (t) = Taken By, (c) = Cosigned By    Initials Name Provider Type    Mckenna Landaverde PTA Physical Therapy Assistant               Therapy Charges for Today     Code Description Service Date Service Provider Modifiers Qty    [id number] HC PT THER PROC EA 15 MIN 12/18/2021 Mckenna Wong, PTA GP 1          PT G-Codes  Outcome Measure Options: AM-PAC 6 Clicks Basic Mobility (PT)  AM-PAC 6 Clicks Score (PT): 11    Mckenna Wong PTA  12/18/2021

## 2021-12-18 NOTE — PLAN OF CARE
Goal Outcome Evaluation:  Plan of Care Reviewed With: patient       Patient had complaints of pain throughout the shift, see MAR for pain management.   Patient is awaiting placement for rehab. No significant changes throughout the shift. Patient VSS.

## 2021-12-18 NOTE — PLAN OF CARE
Goal Outcome Evaluation:  Plan of Care Reviewed With: patient        Progress: no change    VSS. Medicated with IV pain medication x1 this shift. Eager to find out where he will be discharged to.

## 2021-12-19 ENCOUNTER — APPOINTMENT (OUTPATIENT)
Dept: ULTRASOUND IMAGING | Facility: HOSPITAL | Age: 61
End: 2021-12-19

## 2021-12-19 PROBLEM — R74.8 HIGH ALKALINE PHOSPHATASE LEVEL: Status: ACTIVE | Noted: 2021-12-19

## 2021-12-19 LAB
ALBUMIN SERPL-MCNC: 2.7 G/DL (ref 3.5–5.2)
ALBUMIN/GLOB SERPL: 0.7 G/DL
ALP SERPL-CCNC: 584 U/L (ref 39–117)
ALT SERPL W P-5'-P-CCNC: 64 U/L (ref 1–41)
ANION GAP SERPL CALCULATED.3IONS-SCNC: 6.5 MMOL/L (ref 5–15)
AST SERPL-CCNC: 81 U/L (ref 1–40)
BASOPHILS # BLD AUTO: 0.03 10*3/MM3 (ref 0–0.2)
BASOPHILS NFR BLD AUTO: 0.6 % (ref 0–1.5)
BILIRUB SERPL-MCNC: 0.4 MG/DL (ref 0–1.2)
BUN SERPL-MCNC: 12 MG/DL (ref 8–23)
BUN/CREAT SERPL: 17.6 (ref 7–25)
CALCIUM SPEC-SCNC: 8.6 MG/DL (ref 8.6–10.5)
CHLORIDE SERPL-SCNC: 104 MMOL/L (ref 98–107)
CO2 SERPL-SCNC: 26.5 MMOL/L (ref 22–29)
CREAT SERPL-MCNC: 0.68 MG/DL (ref 0.76–1.27)
DEPRECATED RDW RBC AUTO: 43.4 FL (ref 37–54)
EOSINOPHIL # BLD AUTO: 0.16 10*3/MM3 (ref 0–0.4)
EOSINOPHIL NFR BLD AUTO: 3.1 % (ref 0.3–6.2)
ERYTHROCYTE [DISTWIDTH] IN BLOOD BY AUTOMATED COUNT: 13.9 % (ref 12.3–15.4)
GFR SERPL CREATININE-BSD FRML MDRD: 118 ML/MIN/1.73
GLOBULIN UR ELPH-MCNC: 3.7 GM/DL
GLUCOSE BLDC GLUCOMTR-MCNC: 136 MG/DL (ref 70–99)
GLUCOSE BLDC GLUCOMTR-MCNC: 220 MG/DL (ref 70–99)
GLUCOSE BLDC GLUCOMTR-MCNC: 272 MG/DL (ref 70–99)
GLUCOSE BLDC GLUCOMTR-MCNC: 325 MG/DL (ref 70–99)
GLUCOSE SERPL-MCNC: 168 MG/DL (ref 65–99)
HCT VFR BLD AUTO: 35.3 % (ref 37.5–51)
HGB BLD-MCNC: 11.6 G/DL (ref 13–17.7)
IMM GRANULOCYTES # BLD AUTO: 0.01 10*3/MM3 (ref 0–0.05)
IMM GRANULOCYTES NFR BLD AUTO: 0.2 % (ref 0–0.5)
LYMPHOCYTES # BLD AUTO: 0.93 10*3/MM3 (ref 0.7–3.1)
LYMPHOCYTES NFR BLD AUTO: 17.9 % (ref 19.6–45.3)
MCH RBC QN AUTO: 28 PG (ref 26.6–33)
MCHC RBC AUTO-ENTMCNC: 32.9 G/DL (ref 31.5–35.7)
MCV RBC AUTO: 85.3 FL (ref 79–97)
MONOCYTES # BLD AUTO: 0.66 10*3/MM3 (ref 0.1–0.9)
MONOCYTES NFR BLD AUTO: 12.7 % (ref 5–12)
NEUTROPHILS NFR BLD AUTO: 3.42 10*3/MM3 (ref 1.7–7)
NEUTROPHILS NFR BLD AUTO: 65.5 % (ref 42.7–76)
NRBC BLD AUTO-RTO: 0 /100 WBC (ref 0–0.2)
PLATELET # BLD AUTO: 148 10*3/MM3 (ref 140–450)
PMV BLD AUTO: 10.8 FL (ref 6–12)
POTASSIUM SERPL-SCNC: 3.7 MMOL/L (ref 3.5–5.2)
PROT SERPL-MCNC: 6.4 G/DL (ref 6–8.5)
RBC # BLD AUTO: 4.14 10*6/MM3 (ref 4.14–5.8)
SODIUM SERPL-SCNC: 137 MMOL/L (ref 136–145)
WBC NRBC COR # BLD: 5.21 10*3/MM3 (ref 3.4–10.8)

## 2021-12-19 PROCEDURE — 80053 COMPREHEN METABOLIC PANEL: CPT | Performed by: INTERNAL MEDICINE

## 2021-12-19 PROCEDURE — 85025 COMPLETE CBC W/AUTO DIFF WBC: CPT | Performed by: INTERNAL MEDICINE

## 2021-12-19 PROCEDURE — 63710000001 INSULIN DETEMIR PER 5 UNITS: Performed by: INTERNAL MEDICINE

## 2021-12-19 PROCEDURE — 99222 1ST HOSP IP/OBS MODERATE 55: CPT | Performed by: INTERNAL MEDICINE

## 2021-12-19 PROCEDURE — 84075 ASSAY ALKALINE PHOSPHATASE: CPT | Performed by: INTERNAL MEDICINE

## 2021-12-19 PROCEDURE — 25010000002 MORPHINE PER 10 MG: Performed by: INTERNAL MEDICINE

## 2021-12-19 PROCEDURE — 82962 GLUCOSE BLOOD TEST: CPT

## 2021-12-19 PROCEDURE — 84080 ASSAY ALKALINE PHOSPHATASES: CPT | Performed by: INTERNAL MEDICINE

## 2021-12-19 PROCEDURE — 76705 ECHO EXAM OF ABDOMEN: CPT

## 2021-12-19 PROCEDURE — 63710000001 INSULIN LISPRO (HUMAN) PER 5 UNITS: Performed by: PODIATRIST

## 2021-12-19 RX ADMIN — INSULIN DETEMIR 40 UNITS: 100 INJECTION, SOLUTION SUBCUTANEOUS at 22:48

## 2021-12-19 RX ADMIN — MORPHINE SULFATE 2 MG: 2 INJECTION, SOLUTION INTRAMUSCULAR; INTRAVENOUS at 12:30

## 2021-12-19 RX ADMIN — ATORVASTATIN CALCIUM 10 MG: 10 TABLET, FILM COATED ORAL at 20:42

## 2021-12-19 RX ADMIN — INSULIN LISPRO 7 UNITS: 100 INJECTION, SOLUTION INTRAVENOUS; SUBCUTANEOUS at 17:18

## 2021-12-19 RX ADMIN — BUPROPION HYDROCHLORIDE 150 MG: 150 TABLET, EXTENDED RELEASE ORAL at 08:40

## 2021-12-19 RX ADMIN — TAMSULOSIN HYDROCHLORIDE 0.4 MG: 0.4 CAPSULE ORAL at 20:42

## 2021-12-19 RX ADMIN — PREGABALIN 25 MG: 25 CAPSULE ORAL at 20:42

## 2021-12-19 RX ADMIN — APIXABAN 5 MG: 5 TABLET, FILM COATED ORAL at 20:42

## 2021-12-19 RX ADMIN — SODIUM CHLORIDE, PRESERVATIVE FREE 10 ML: 5 INJECTION INTRAVENOUS at 08:42

## 2021-12-19 RX ADMIN — FAMOTIDINE 40 MG: 20 TABLET ORAL at 08:40

## 2021-12-19 RX ADMIN — HYDROCODONE BITARTRATE AND ACETAMINOPHEN 1 TABLET: 10; 325 TABLET ORAL at 17:19

## 2021-12-19 RX ADMIN — APIXABAN 5 MG: 5 TABLET, FILM COATED ORAL at 08:40

## 2021-12-19 RX ADMIN — INSULIN LISPRO 4 UNITS: 100 INJECTION, SOLUTION INTRAVENOUS; SUBCUTANEOUS at 11:12

## 2021-12-19 RX ADMIN — SODIUM CHLORIDE, PRESERVATIVE FREE 10 ML: 5 INJECTION INTRAVENOUS at 20:44

## 2021-12-19 RX ADMIN — INSULIN LISPRO 6 UNITS: 100 INJECTION, SOLUTION INTRAVENOUS; SUBCUTANEOUS at 22:49

## 2021-12-19 RX ADMIN — CITALOPRAM HYDROBROMIDE 10 MG: 20 TABLET ORAL at 20:42

## 2021-12-19 RX ADMIN — PREGABALIN 25 MG: 25 CAPSULE ORAL at 08:40

## 2021-12-19 RX ADMIN — BUPROPION HYDROCHLORIDE 150 MG: 150 TABLET, EXTENDED RELEASE ORAL at 20:42

## 2021-12-19 NOTE — PLAN OF CARE
Goal Outcome Evaluation:  Plan of Care Reviewed With: patient        Progress: improving  Patient is awaiting placement for rehab. Patient is able to pivot and assist staff in getting on BSC. Patient still has wound vac on right foot. Patient complained of throbbing pain in legs from ambulating, see MAR for pain management.   General surgery was consulted for abscess on back.

## 2021-12-19 NOTE — PROGRESS NOTES
[facility]     Progress Note    Patient Name: Jose Shaikh  : 1960  MRN: [id number]  Primary Care Physician:  Corazon Gr MD  Date of admission: 2021      Subjective   Brief summary.  Patient admitted several days ago with cellulitis of the foot status post amputation.      HPI:  Follow-up on foot infection, feeling better as for the foot goes pain control decent with oral meds, continuing on vacuum pump.  Patient reports some swelling in the back, patient has recurrent abscesses in the back and has been drained.  Feels like he is developing an abscess in the back.  Sugars are better after increasing insulin.  Patient also had ultrasound of the liver done for high alk phos.  No abdominal pain    Review of Systems     No nausea vomiting fever  No chest pain,  No abdominal pain  Back pain and possible abscess in the back.  Foot pain tolerable.      Objective     Vitals:   Temp:  [97.9 °F (36.6 °C)-98.7 °F (37.1 °C)] 97.9 °F (36.6 °C)  Heart Rate:  [61-70] 61  Resp:  [18] 18  BP: (100-119)/(47-69) 103/47    Physical Exam :     .Morbidly obese male not in acute distress awake alert and oriented'  Heart regular  Lungs clear  Abdomen obese soft nontender no right upper quadrant tenderness  Extremities with minimal edema in the right leg surgical dressing placed on the right foot  Back with a swelling of approximately 3 x 4 cm fluctuant minimal tenderness.  It is paraspinal to the L1 and T12 level on the right side        Result Review:  I have personally reviewed the results from the time of this admission to 2021 11:20 EST and agree with these findings:  [x]  Laboratory  []  Microbiology  [x]  Radiology  []  EKG/Telemetry   []  Cardiology/Vascular   []  Pathology  []  Old records  []  Other:  Right upper quadrant ultrasound negative      Assessment / Plan       Active H alk phos elevated ospital Problems:  Active Hospital Problems    Diagnosis    • **Cellulitis and abscess of foot    •  High alkaline phosphatase level    • Lactic acidosis    • Essential hypertension    • Diabetic ulcer of right midfoot associated with type 2 DM    • Diabetic ulcer of left heel associated with type 2 DM        Plan:   Patient stable and improving.  Wound VAC in place, wound care nurse and podiatrist following for the cellulitis infection of the foot.  Antibiotics are to finished after prolonged course.  We will follow labs.  High alk phos, right upper quadrant ultrasound negative for gallbladder issues.  We will consult gastro for opinion.  Consult gastroenterologist Dr. Elizabeth  Also will try to get fractionated alk phos levels.  Patient has developed abscess-like swelling on the back.  Will consult general surgeon Dr. Orosco, notified.  Recheck labs in a.m.  Waiting for placement       DVT prophylaxis:  Medical DVT prophylaxis orders are present.    CODE STATUS:   Code Status (Patient has no pulse and is not breathing): CPR (Attempt to Resuscitate)  Medical Interventions (Patient has pulse or is breathing): Full Support            Electronically signed by Yung Grijalva MD, 12/19/21, 11:16 AM EST.

## 2021-12-19 NOTE — PLAN OF CARE
Goal Outcome Evaluation:  Plan of Care Reviewed With: patient        Progress: no change    Patient has been NPO since 0001 for abdominal ultrasound in the am. VSS. Complaitns of pain managed with morphine x1 this shift

## 2021-12-20 PROBLEM — L02.212 ABSCESS OF BACK: Status: ACTIVE | Noted: 2021-12-20

## 2021-12-20 LAB
ALBUMIN SERPL-MCNC: 2.5 G/DL (ref 3.5–5.2)
ALBUMIN/GLOB SERPL: 0.7 G/DL
ALP SERPL-CCNC: 489 U/L (ref 39–117)
ALT SERPL W P-5'-P-CCNC: 55 U/L (ref 1–41)
ANION GAP SERPL CALCULATED.3IONS-SCNC: 7.4 MMOL/L (ref 5–15)
AST SERPL-CCNC: 56 U/L (ref 1–40)
BASOPHILS # BLD AUTO: 0.04 10*3/MM3 (ref 0–0.2)
BASOPHILS NFR BLD AUTO: 0.9 % (ref 0–1.5)
BILIRUB SERPL-MCNC: 0.5 MG/DL (ref 0–1.2)
BUN SERPL-MCNC: 12 MG/DL (ref 8–23)
BUN/CREAT SERPL: 19.7 (ref 7–25)
CALCIUM SPEC-SCNC: 8.4 MG/DL (ref 8.6–10.5)
CHLORIDE SERPL-SCNC: 103 MMOL/L (ref 98–107)
CO2 SERPL-SCNC: 26.6 MMOL/L (ref 22–29)
CREAT SERPL-MCNC: 0.61 MG/DL (ref 0.76–1.27)
DEPRECATED RDW RBC AUTO: 44.6 FL (ref 37–54)
EOSINOPHIL # BLD AUTO: 0.14 10*3/MM3 (ref 0–0.4)
EOSINOPHIL NFR BLD AUTO: 3 % (ref 0.3–6.2)
ERYTHROCYTE [DISTWIDTH] IN BLOOD BY AUTOMATED COUNT: 14.1 % (ref 12.3–15.4)
GFR SERPL CREATININE-BSD FRML MDRD: 134 ML/MIN/1.73
GLOBULIN UR ELPH-MCNC: 3.6 GM/DL
GLUCOSE BLDC GLUCOMTR-MCNC: 172 MG/DL (ref 70–99)
GLUCOSE BLDC GLUCOMTR-MCNC: 208 MG/DL (ref 70–99)
GLUCOSE BLDC GLUCOMTR-MCNC: 231 MG/DL (ref 70–99)
GLUCOSE BLDC GLUCOMTR-MCNC: 253 MG/DL (ref 70–99)
GLUCOSE SERPL-MCNC: 227 MG/DL (ref 65–99)
HAV IGM SERPL QL IA: NORMAL
HBV CORE IGM SERPL QL IA: NORMAL
HBV SURFACE AG SERPL QL IA: NORMAL
HCT VFR BLD AUTO: 34.1 % (ref 37.5–51)
HCV AB SER DONR QL: NORMAL
HGB BLD-MCNC: 11.2 G/DL (ref 13–17.7)
IMM GRANULOCYTES # BLD AUTO: 0.03 10*3/MM3 (ref 0–0.05)
IMM GRANULOCYTES NFR BLD AUTO: 0.6 % (ref 0–0.5)
LYMPHOCYTES # BLD AUTO: 0.86 10*3/MM3 (ref 0.7–3.1)
LYMPHOCYTES NFR BLD AUTO: 18.6 % (ref 19.6–45.3)
MCH RBC QN AUTO: 28.5 PG (ref 26.6–33)
MCHC RBC AUTO-ENTMCNC: 32.8 G/DL (ref 31.5–35.7)
MCV RBC AUTO: 86.8 FL (ref 79–97)
MONOCYTES # BLD AUTO: 0.42 10*3/MM3 (ref 0.1–0.9)
MONOCYTES NFR BLD AUTO: 9.1 % (ref 5–12)
NEUTROPHILS NFR BLD AUTO: 3.13 10*3/MM3 (ref 1.7–7)
NEUTROPHILS NFR BLD AUTO: 67.8 % (ref 42.7–76)
NRBC BLD AUTO-RTO: 0 /100 WBC (ref 0–0.2)
PLATELET # BLD AUTO: 138 10*3/MM3 (ref 140–450)
PMV BLD AUTO: 10.8 FL (ref 6–12)
POTASSIUM SERPL-SCNC: 4.4 MMOL/L (ref 3.5–5.2)
PROT SERPL-MCNC: 6.1 G/DL (ref 6–8.5)
RBC # BLD AUTO: 3.93 10*6/MM3 (ref 4.14–5.8)
SARS-COV-2 RNA PNL SPEC NAA+PROBE: NOT DETECTED
SODIUM SERPL-SCNC: 137 MMOL/L (ref 136–145)
WBC NRBC COR # BLD: 4.62 10*3/MM3 (ref 3.4–10.8)

## 2021-12-20 PROCEDURE — 97606 NEG PRS WND THER DME>50 SQCM: CPT

## 2021-12-20 PROCEDURE — 94799 UNLISTED PULMONARY SVC/PX: CPT

## 2021-12-20 PROCEDURE — 82962 GLUCOSE BLOOD TEST: CPT

## 2021-12-20 PROCEDURE — U0004 COV-19 TEST NON-CDC HGH THRU: HCPCS | Performed by: INTERNAL MEDICINE

## 2021-12-20 PROCEDURE — 63710000001 INSULIN LISPRO (HUMAN) PER 5 UNITS: Performed by: PODIATRIST

## 2021-12-20 PROCEDURE — 84080 ASSAY ALKALINE PHOSPHATASES: CPT | Performed by: INTERNAL MEDICINE

## 2021-12-20 PROCEDURE — 63710000001 INSULIN DETEMIR PER 5 UNITS: Performed by: INTERNAL MEDICINE

## 2021-12-20 PROCEDURE — 80074 ACUTE HEPATITIS PANEL: CPT | Performed by: INTERNAL MEDICINE

## 2021-12-20 PROCEDURE — 85025 COMPLETE CBC W/AUTO DIFF WBC: CPT | Performed by: INTERNAL MEDICINE

## 2021-12-20 PROCEDURE — 99024 POSTOP FOLLOW-UP VISIT: CPT | Performed by: PODIATRIST

## 2021-12-20 PROCEDURE — 80053 COMPREHEN METABOLIC PANEL: CPT | Performed by: INTERNAL MEDICINE

## 2021-12-20 PROCEDURE — 25010000002 MORPHINE PER 10 MG: Performed by: INTERNAL MEDICINE

## 2021-12-20 RX ORDER — LIDOCAINE HYDROCHLORIDE 10 MG/ML
10 INJECTION, SOLUTION INFILTRATION; PERINEURAL ONCE
Status: DISCONTINUED | OUTPATIENT
Start: 2021-12-20 | End: 2021-12-21 | Stop reason: HOSPADM

## 2021-12-20 RX ADMIN — INSULIN LISPRO 4 UNITS: 100 INJECTION, SOLUTION INTRAVENOUS; SUBCUTANEOUS at 12:08

## 2021-12-20 RX ADMIN — INSULIN LISPRO 4 UNITS: 100 INJECTION, SOLUTION INTRAVENOUS; SUBCUTANEOUS at 20:35

## 2021-12-20 RX ADMIN — SENNOSIDES AND DOCUSATE SODIUM 1 TABLET: 50; 8.6 TABLET ORAL at 20:40

## 2021-12-20 RX ADMIN — SODIUM CHLORIDE, PRESERVATIVE FREE 10 ML: 5 INJECTION INTRAVENOUS at 20:34

## 2021-12-20 RX ADMIN — PREGABALIN 25 MG: 25 CAPSULE ORAL at 08:18

## 2021-12-20 RX ADMIN — LISINOPRIL 20 MG: 20 TABLET ORAL at 08:19

## 2021-12-20 RX ADMIN — MORPHINE SULFATE 2 MG: 2 INJECTION, SOLUTION INTRAMUSCULAR; INTRAVENOUS at 12:08

## 2021-12-20 RX ADMIN — ACETAMINOPHEN 650 MG: 325 TABLET ORAL at 08:19

## 2021-12-20 RX ADMIN — FAMOTIDINE 40 MG: 20 TABLET ORAL at 08:18

## 2021-12-20 RX ADMIN — SODIUM CHLORIDE, PRESERVATIVE FREE 10 ML: 5 INJECTION INTRAVENOUS at 08:20

## 2021-12-20 RX ADMIN — TAMSULOSIN HYDROCHLORIDE 0.4 MG: 0.4 CAPSULE ORAL at 20:40

## 2021-12-20 RX ADMIN — INSULIN DETEMIR 40 UNITS: 100 INJECTION, SOLUTION SUBCUTANEOUS at 20:35

## 2021-12-20 RX ADMIN — INSULIN LISPRO 2 UNITS: 100 INJECTION, SOLUTION INTRAVENOUS; SUBCUTANEOUS at 08:17

## 2021-12-20 RX ADMIN — APIXABAN 5 MG: 5 TABLET, FILM COATED ORAL at 20:41

## 2021-12-20 RX ADMIN — MORPHINE SULFATE 2 MG: 2 INJECTION, SOLUTION INTRAMUSCULAR; INTRAVENOUS at 20:34

## 2021-12-20 RX ADMIN — BUPROPION HYDROCHLORIDE 150 MG: 150 TABLET, EXTENDED RELEASE ORAL at 20:40

## 2021-12-20 RX ADMIN — METOPROLOL SUCCINATE 25 MG: 25 TABLET, EXTENDED RELEASE ORAL at 08:18

## 2021-12-20 RX ADMIN — HYDROCODONE BITARTRATE AND ACETAMINOPHEN 1 TABLET: 10; 325 TABLET ORAL at 08:24

## 2021-12-20 RX ADMIN — INSULIN LISPRO 6 UNITS: 100 INJECTION, SOLUTION INTRAVENOUS; SUBCUTANEOUS at 17:42

## 2021-12-20 RX ADMIN — PREGABALIN 25 MG: 25 CAPSULE ORAL at 20:40

## 2021-12-20 RX ADMIN — BUPROPION HYDROCHLORIDE 150 MG: 150 TABLET, EXTENDED RELEASE ORAL at 08:18

## 2021-12-20 RX ADMIN — ATORVASTATIN CALCIUM 10 MG: 10 TABLET, FILM COATED ORAL at 20:40

## 2021-12-20 RX ADMIN — HYDROCODONE BITARTRATE AND ACETAMINOPHEN 1 TABLET: 10; 325 TABLET ORAL at 23:28

## 2021-12-20 RX ADMIN — CITALOPRAM HYDROBROMIDE 10 MG: 20 TABLET ORAL at 20:41

## 2021-12-20 RX ADMIN — MORPHINE SULFATE 2 MG: 2 INJECTION, SOLUTION INTRAMUSCULAR; INTRAVENOUS at 02:14

## 2021-12-20 RX ADMIN — APIXABAN 5 MG: 5 TABLET, FILM COATED ORAL at 08:19

## 2021-12-20 NOTE — PLAN OF CARE
Goal Outcome Evaluation:         Patient awaiting bed placement for inpatient rehab, bed available per  at [facility]. Waiting on covid test results. Patient seen by wound care RN today, wound vac changed and wound care performed. Patient also had abscess on back drained per wound care RN and packing placed, APRN notified. Patient had complaints of pain, prn pain meds given.

## 2021-12-20 NOTE — SIGNIFICANT NOTE
12/20/21 1145   Wound 06/22/21 1133 Left lateral heel   Placement Date/Time: 06/22/21 1133   Present on Hospital Admission: Yes  Side: Left  Orientation: lateral  Location: heel   Wound Image    Dressing Appearance intact; dry   Base red; moist; clean   Periwound dry; intact   Periwound Temperature warm   Periwound Skin Turgor soft   Edges rolled/closed   Wound Length (cm) 1.4 cm   Wound Width (cm) 2.4 cm   Wound Depth (cm) 0.7 cm   Drainage Characteristics/Odor serosanguineous   Drainage Amount small   Care, Wound cleansed with; irrigated with; sterile normal saline   Dressing Care dressing moistened; foam; other (see comments); dressing applied; gauze, dry; silicone; border dressing  (Hydrafera)   Periwound Care barrier film applied   Wound 12/02/21 Right anterior foot Incision   Placement Date: 12/02/21   Side: Right  Orientation: anterior  Location: foot  Primary Wound Type: Incision   Wound Image     Dressing Appearance intact; dry   Base red; moist; granulating; exposed structure   Periwound pink; blanchable; intact; warm   Periwound Temperature warm   Periwound Skin Turgor soft   Edges open; irregular   Wound Length (cm) 5.2 cm   Wound Width (cm) 13.9 cm   Wound Depth (cm) 1.3 cm   Drainage Characteristics/Odor sanguineous; bleeding controlled   Drainage Amount small   Care, Wound cleansed with; irrigated with; sterile normal saline   Dressing Care dressing applied; skin barrier agent applied; transparent film; low-adherent; silver impregnated; foam   Periwound Care barrier film applied   Wound 12/18/21 1829 Right upper back Abcess   Placement Date/Time: 12/18/21 1829   Present on Hospital Admission: No  Side: Right  Orientation: upper  Location: back  Primary Wound Type: Abcess   Wound Image    Dressing Appearance intact; copious drainage   Base red; bleeding   Periwound warm; swelling; redness   Periwound Temperature warm   Periwound Skin Turgor soft   Wound Length (cm) 1.5 cm   Wound Width (cm) 0.3 cm    Wound Depth (cm) 3 cm   Tunneling [Depth (cm)/Location] 2.4 at 6 o'clock   Drainage Characteristics/Odor purulent; sanguineous; creamy; other (see comments)  (chunky)   Drainage Amount copious   Care, Wound cleansed with; irrigated with; sterile normal saline   Dressing Care packed with; gauze, iodoform; dressing applied; gauze, dry; transparent film   Periwound Care dry periwound area maintained   NPWT (Negative Pressure Wound Therapy) 12/13/21 1200 Right anterior foot   Placement Date/Time: 12/13/21 1200   Location: Right anterior foot   Therapy Setting continuous therapy   Dressing foam, black   Contact Layer other (see comments)  (low-adherant versatil)   Pressure Setting 125 mmHg   Sponges Inserted 1   Sponges Removed 1   Finger sweep complete Yes     Wound follow-up/Vac follow-up: Upon reviewing progress notes mention of back noted, inquired with patient. Patient said that Dr. Orosco was consulted to look at abscess on back. Upon removal of silicone border dressing, abscess began to drain copious amounts of creamy, purulent, chunky sanguineous drainage. Cleansed and irrigated wound with copious amount of normal saline. Filled wound bed with iodoform gauze and covered with dry gauze and transparent drape. Primary RN provided patient with pain medication for dressing change. Wound vac cannister half full with serosanguineous drainage. Removed 1 black foam from wound bed and 1 low adherent gauze. Cleansed and irrigated with copious amount of normal saline and gauze. Wound bed is red and moist, exposed structure present; granulation present. Plantar flap dusky, podiatry aware.  Applied 1 low adherent gauze to wound bed and filled with 1 black foam. Periwound pink and intact; patient no longer complains of discomfort. Applied skin prep to periwound and covered with PolyMem foam. Applied transparent drape. Foam compressed, seal obtained, no alarms noted. Applied dry gauze to bolster vac line from skin and  wrapped with gauze roll. Left foot dressing removed; clean dry and intact. Moist drainage to dressing. Wound bed is red and moist, wound edges are white. Periwound is dry and callused. Cleansed and irrigated with normal saline and gauze. Filled wound bed with normal saline moistened Hydrafera blue, covered with dry gauze and silicone border dressing. Wrapped with gauze roll and elastic bandage. Karon Bolton RN

## 2021-12-20 NOTE — PLAN OF CARE
Goal Outcome Evaluation:  Plan of Care Reviewed With: patient        Progress: no change    VSS. No significant changes.

## 2021-12-20 NOTE — PROGRESS NOTES
[facility] - PODIATRY    Today's Date: 12/20/21    Patient Name: Jose Shaikh  MRN: [id number]  CSN: [id number]  PCP: Corazon Gr MD  Referring Provider: Yung rGijalva MD  Attending Provider: Yung Grijalva MD  Length of Stay: 19    SUBJECTIVE   Chief Complaint: Right foot osteomyelitis    HPI: Jose Shaikh, a 61 y.o.male,     Procedure: I&D of right midfoot  Date: 10 December 2021    Procedure: Right midfoot amputation  Date: 2 December 2021    Patient states he is feeling better today.    Patient states that he is looking forward to rehabilitation placement.    Patient denies any fevers, chills, nausea, vomiting, shortness of breathe, nor any other constitutional signs nor symptoms.      Past Medical History:   Diagnosis Date   • Absence of toe of right foot    • Acute osteomyelitis of left calcaneus  8/18/2021   • Anxiety and depression    • Arthritis    • Claustrophobia    • Corns and callus    • Diabetic ulcer of left heel associated with type 2 DM 8/18/2021   • Diabetic ulcer of left heel associated with type 2 DM 7/6/2021   • Diabetic ulcer of right midfoot associated with type 2 DM 8/18/2021   • Difficulty walking    • Essential hypertension 8/31/2021   • Hammertoe    • Hyperlipidemia LDL goal <100 8/31/2021   • Ingrown toenail    • Obesity    • Paroxysmal atrial fibrillation 8/31/2021   • Polyneuropathy    • Pressure ulcer, stage 1    • Tinea unguium    • Type 2 diabetes mellitus with polyneuropathy      Past Surgical History:   Procedure Laterality Date   • CYST REMOVAL      center of back; benign   • INCISION AND DRAINAGE ABSCESS      back   • INCISION AND DRAINAGE LEG Right 12/10/2021    Procedure: INCISION AND DRAINAGE LOWER EXTREMITY;  Surgeon: Ash Leyva DPM;  Location: [facility] MAIN OR;  Service: Podiatry;  Laterality: Right;   • OTHER SURGICAL HISTORY      Surgical clips left foot   • TOE SURGERY Right     Removal of 5th toe   • TRANS METATARSAL AMPUTATION Right  2021    Procedure: AMPUTATION TRANS METATARSAL;  Surgeon: Ash Leyva DPM;  Location: [facility] MAIN OR;  Service: Podiatry;  Laterality: Right;   • WRIST SURGERY Left     repair of injury     Family History   Problem Relation Age of Onset   • Heart disease Mother    • Heart disease Father    • Cancer Father         Unspecified   • Heart disease Brother      Social History     Socioeconomic History   • Marital status: Single   Tobacco Use   • Smoking status: Former Smoker     Packs/day: 0.00     Years: 1.00     Pack years: 0.00     Quit date: 1991     Years since quittin.3   • Smokeless tobacco: Never Used   • Tobacco comment: quit at age 30   Vaping Use   • Vaping Use: Never used   Substance and Sexual Activity   • Alcohol use: Yes     Comment: Rare   • Drug use: Yes     Types: Marijuana     Comment: Daily   • Sexual activity: Defer     Allergies   Allergen Reactions   • Adhesive Tape Rash     Current Facility-Administered Medications   Medication Dose Route Frequency Provider Last Rate Last Admin   • acetaminophen (TYLENOL) suppository 650 mg  650 mg Rectal Q4H PRN Ash Leyva DPM       • acetaminophen (TYLENOL) tablet 650 mg  650 mg Oral Q4H PRN Ash Leyva DPM   650 mg at 21   • aluminum-magnesium hydroxide-simethicone (MAALOX MAX) 400-400-40 MG/5ML suspension 15 mL  15 mL Oral Q6H PRN Ash Leyva DPM       • apixaban (ELIQUIS) tablet 5 mg  5 mg Oral Q12H Ash Leyva DPM   5 mg at 21   • atorvastatin (LIPITOR) tablet 10 mg  10 mg Oral Nightly Ash Leyva DPM   10 mg at 21   • sennosides-docusate (PERICOLACE) 8.6-50 MG per tablet 1 tablet  1 tablet Oral BID Ash Leyva DPM   1 tablet at 21    And   • polyethylene glycol (MIRALAX) packet 17 g  17 g Oral Daily PRN Ash Leyva DPM        And   • bisacodyl (DULCOLAX) EC tablet 5 mg  5 mg Oral Daily PRN Gordon  Ash Aleman DPM        And   • bisacodyl (DULCOLAX) suppository 10 mg  10 mg Rectal Daily PRN Ash Leyva DPM       • buPROPion XL (WELLBUTRIN XL) 24 hr tablet 150 mg  150 mg Oral BID Ash Leyva DPM   150 mg at 12/19/21 2042   • citalopram (CeleXA) tablet 10 mg  10 mg Oral Daily Ash Leyva DPM   10 mg at 12/19/21 2042   • dextrose (GLUTOSE) oral gel 15 g  15 g Oral Q15 Min PRN Ash Leyva DPM       • dextrose 10 % infusion  25 g Intravenous Q15 Min PRN Ash Leyva DPM       • diphenhydrAMINE (BENADRYL) capsule 25 mg  25 mg Oral Q6H PRN Yung Grijalva MD   25 mg at 12/14/21 1614   • famotidine (PEPCID) tablet 40 mg  40 mg Oral Daily Ash Leyva DPM   40 mg at 12/19/21 0840   • glucagon (human recombinant) (GLUCAGEN DIAGNOSTIC) injection 1 mg  1 mg Subcutaneous Q15 Min PRN Ash Leyva DPM       • HYDROcodone-acetaminophen (NORCO)  MG per tablet 1 tablet  1 tablet Oral Q6H PRN Yung Grijalva MD   1 tablet at 12/19/21 1719   • insulin detemir (LEVEMIR) injection 40 Units  40 Units Subcutaneous Nightly Yung Grijalva MD   40 Units at 12/19/21 2248   • insulin lispro (humaLOG) injection 0-9 Units  0-9 Units Subcutaneous 4x Daily With Meals & Nightly Ash Leyva DPM   6 Units at 12/19/21 2249   • lactated ringers infusion  9 mL/hr Intravenous Continuous PRN Ash Leyva DPM       • lisinopril (PRINIVIL,ZESTRIL) tablet 20 mg  20 mg Oral Daily Ash Leyva DPM   20 mg at 12/18/21 0752   • loperamide (IMODIUM) capsule 2 mg  2 mg Oral Q4H PRN Ash Leyva DPM       • metoprolol succinate XL (TOPROL-XL) 24 hr tablet 25 mg  25 mg Oral Q24H Ash Leyva DPM   25 mg at 12/18/21 0753   • morphine injection 2 mg  2 mg Intravenous Q6H PRN Yung Grijalva MD   2 mg at 12/20/21 0214   • naloxone (NARCAN) injection 0.4 mg  0.4 mg Intravenous Q5 Min PRN Ash Leyva DPM       •  ondansetron (ZOFRAN) tablet 4 mg  4 mg Oral Q6H PRN Ash Leyva DPM        Or   • ondansetron (ZOFRAN) injection 4 mg  4 mg Intravenous Q6H PRN Ash Leyva DPM       • pregabalin (LYRICA) capsule 25 mg  25 mg Oral Q12H Ash Leyva DPM   25 mg at 12/19/21 2042   • sodium chloride 0.9 % flush 10 mL  10 mL Intravenous Q12H Ash Leyva DPM   10 mL at 12/19/21 2044   • sodium chloride 0.9 % flush 10 mL  10 mL Intravenous PRN Ash Leyva DPM       • tamsulosin (FLOMAX) 24 hr capsule 0.4 mg  0.4 mg Oral Nightly Ash Leyva DPM   0.4 mg at 12/19/21 2042     Review of Systems   Constitutional: Negative.    Skin:        Bilateral foot ulcers.     All other systems reviewed and are negative.      OBJECTIVE     Vitals:    12/20/21 0600   BP:    Pulse:    Resp:    Temp:    SpO2: 95%       PHYSICAL EXAM    GEN:   A&Ox3, NAD. Pt presents in hospital bed.     Neurovascular status unchanged    Ortho: Right midfoot amputation    Right foot: VeroFlo VAC Dressing intact with no leaks. Continued improvement in edema and erythema seen in midfoot.    Left plantar heel: Heel shows stage III ulceration.  Healthy granular base.  No surrounding edema no erythema seen.    LABORATORY/CULTURE RESULTS:  Results from last 7 days   Lab Units 12/20/21  0616 12/19/21  0545 12/18/21  0714   WBC 10*3/mm3 4.62 5.21 5.35   HEMOGLOBIN g/dL 11.2* 11.6* 11.9*   HEMATOCRIT % 34.1* 35.3* 36.7*   PLATELETS 10*3/mm3 138* 148 150     Results from last 7 days   Lab Units 12/20/21  0616 12/19/21  0545 12/18/21  0714   SODIUM mmol/L 137 137 136   POTASSIUM mmol/L 4.4 3.7 4.2   CHLORIDE mmol/L 103 104 102   CO2 mmol/L 26.6 26.5 27.2   BUN mg/dL 12 12 10   CREATININE mg/dL 0.61* 0.68* 0.66*   CALCIUM mg/dL 8.4* 8.6 8.8   BILIRUBIN mg/dL 0.5 0.4 0.6   ALK PHOS U/L 489* 584* 563*   ALT (SGPT) U/L 55* 64* 60*   AST (SGOT) U/L 56* 81* 75*   GLUCOSE mg/dL 227* 168* 135*         Microbiology Results  (last 10 days)     Procedure Component Value - Date/Time    Anaerobic Culture - Wound, Foot, Right [[id number]]  (Normal) Collected: 12/10/21 1835    Lab Status: Final result Specimen: Wound from Foot, Right Updated: 12/15/21 0711     Anaerobic Culture No anaerobes isolated at 5 days    Wound Culture - Wound, Foot, Right [[id number]]  (Abnormal) Collected: 12/10/21 1835    Lab Status: Final result Specimen: Wound from Foot, Right Updated: 12/12/21 0950     Wound Culture Moderate growth (3+) Corynebacterium species     Comment: No definitive guidelines. All are susceptible to vancomycin. Resistance to penicillins & cephalosporins does occur.        Gram Stain Moderate (3+) WBCs seen      Rare (1+) Gram negative bacilli, tiny           ASSESSMENT/PLAN     Patient Active Problem List   Diagnosis   • Diabetic ulcer of left heel associated with type 2 DM   • Acute osteomyelitis of left calcaneus    • Diabetic ulcer of left heel associated with type 2 DM   • Diabetic ulcer of right midfoot associated with type 2 DM   • Paroxysmal atrial fibrillation   • Essential hypertension   • Hyperlipidemia LDL goal <100   • Cellulitis and abscess of foot   • Lactic acidosis   • High alkaline phosphatase level       Comprehensive lower extremity examination and evaluation was performed.    Continue current wound care orders.      Recommend rehabilitation placement.    Recommend follow-up with Dr. Shea Daniels at the wound care center after discharge from hospital.          Lab Frequency Next Occurrence   Lipid Panel Once 02/27/2022   WOUND CARE HYPERBARIC weekdays        This document has been electronically signed by Ash Leyva DPM on December 20, 2021 07:40 EST

## 2021-12-20 NOTE — CONSULTS
[facility]  Initial Inpatient Consult Note    Referring Provider: Hospitalist    Reason for Consultation: Elevated alkaline phosphatase    Subjective     History of present illness:    61 y.o. male with diabetes who has been admitted here with wound infection of the right foot, osteomyelitis and subsequent amputation of toes on the right.  I am asked to see the patient as he has had a slow increase of his alkaline phosphatase since admission.  His AST and ALT are mildly elevated but appears stable.  His total bilirubin also remains normal.  He reports a long history of elevated liver enzymes and suspected nonalcoholic fatty liver disease.  He does not have a history of heavy alcohol use.  He had a right upper quadrant ultrasound today that showed no focal liver lesions.  He denies any abdominal pain, nausea, vomiting, fevers chills or any other GI complaints.  He has intentionally lost 60 pounds as well.    Past Medical History:  Past Medical History:   Diagnosis Date   • Absence of toe of right foot    • Acute osteomyelitis of left calcaneus  8/18/2021   • Anxiety and depression    • Arthritis    • Claustrophobia    • Corns and callus    • Diabetic ulcer of left heel associated with type 2 DM 8/18/2021   • Diabetic ulcer of left heel associated with type 2 DM 7/6/2021   • Diabetic ulcer of right midfoot associated with type 2 DM 8/18/2021   • Difficulty walking    • Essential hypertension 8/31/2021   • Hammertoe    • Hyperlipidemia LDL goal <100 8/31/2021   • Ingrown toenail    • Obesity    • Paroxysmal atrial fibrillation 8/31/2021   • Polyneuropathy    • Pressure ulcer, stage 1    • Tinea unguium    • Type 2 diabetes mellitus with polyneuropathy      Past Surgical History:  Past Surgical History:   Procedure Laterality Date   • CYST REMOVAL      center of back; benign   • INCISION AND DRAINAGE ABSCESS      back   • INCISION AND DRAINAGE LEG Right 12/10/2021    Procedure: INCISION AND  DRAINAGE LOWER EXTREMITY;  Surgeon: Ash Leyva DPM;  Location: [facility] MAIN OR;  Service: Podiatry;  Laterality: Right;   • OTHER SURGICAL HISTORY      Surgical clips left foot   • TOE SURGERY Right     Removal of 5th toe   • TRANS METATARSAL AMPUTATION Right 2021    Procedure: AMPUTATION TRANS METATARSAL;  Surgeon: Ash Leyva DPM;  Location: [facility] MAIN OR;  Service: Podiatry;  Laterality: Right;   • WRIST SURGERY Left     repair of injury      Social History:   Social History     Tobacco Use   • Smoking status: Former Smoker     Packs/day: 0.00     Years: 1.00     Pack years: 0.00     Quit date: 1991     Years since quittin.3   • Smokeless tobacco: Never Used   • Tobacco comment: quit at age 30   Substance Use Topics   • Alcohol use: Yes     Comment: Rare      Family History:  Family History   Problem Relation Age of Onset   • Heart disease Mother    • Heart disease Father    • Cancer Father         Unspecified   • Heart disease Brother        Home Meds:  Medications Prior to Admission   Medication Sig Dispense Refill Last Dose   • alfuzosin (UROXATRAL) 10 MG 24 hr tablet Take 10 mg by mouth Daily.   2021 at Unknown time   • apixaban (Eliquis) 5 MG tablet tablet Take 5 mg by mouth 2 (two) times a day.   2021 at Unknown time   • buPROPion XL (WELLBUTRIN XL) 300 MG 24 hr tablet Take 300 mg by mouth Every Morning.   2021 at Unknown time   • citalopram (CeleXA) 10 MG tablet Take 10 mg by mouth Every Night.   2021 at Unknown time   • insulin aspart (NovoLOG) 100 UNIT/ML injection Inject 20 Units under the skin into the appropriate area as directed 3 (Three) Times a Day Before Meals. Take up to 20 units as instructed 20 mL 3 2021 at Unknown time   • insulin detemir (LEVEMIR) 100 UNIT/ML injection Inject 50 Units under the skin into the appropriate area as directed Every Night. Take 40 units and adjust to 50 as instructed 20 mL 3 2021 at Unknown time    • lisinopril (PRINIVIL,ZESTRIL) 20 MG tablet Take 20 mg by mouth Daily.   12/1/2021 at Unknown time   • metFORMIN (GLUCOPHAGE) 1000 MG tablet Take 1,000 mg by mouth 2 (two) times a day.   12/1/2021 at Unknown time   • metoprolol succinate XL (TOPROL-XL) 50 MG 24 hr tablet Take 50 mg by mouth Daily.   12/1/2021 at Unknown time   • simvastatin (Zocor) 20 MG tablet Take 20 mg by mouth Every Night.   11/30/2021 at Unknown time   • glucose blood test strip Test blood sugar 3 times a day 100 each 5    • sertraline (ZOLOFT) 100 MG tablet Take 100 mg by mouth Daily. (Patient not taking: Reported on 12/2/2021)   Not Taking at Unknown time     Current Meds:   apixaban, 5 mg, Oral, Q12H  atorvastatin, 10 mg, Oral, Nightly  buPROPion XL, 150 mg, Oral, BID  citalopram, 10 mg, Oral, Daily  famotidine, 40 mg, Oral, Daily  insulin detemir, 40 Units, Subcutaneous, Nightly  insulin lispro, 0-9 Units, Subcutaneous, 4x Daily With Meals & Nightly  lisinopril, 20 mg, Oral, Daily  metoprolol succinate XL, 25 mg, Oral, Q24H  pregabalin, 25 mg, Oral, Q12H  senna-docusate sodium, 1 tablet, Oral, BID  sodium chloride, 10 mL, Intravenous, Q12H  tamsulosin, 0.4 mg, Oral, Nightly      Allergies:  Allergies   Allergen Reactions   • Adhesive Tape Rash     Review of Systems  Pertinent items are noted in HPI, all other systems reviewed and negative         Vital Signs  Temp:  [97.9 °F (36.6 °C)-98.7 °F (37.1 °C)] 98.5 °F (36.9 °C)  Heart Rate:  [61-72] 68  Resp:  [16-18] 16  BP: (100-119)/(47-77) 115/77  Physical Exam:  General Appearance:    Alert, cooperative, in no acute distress   Head:    Normocephalic, without obvious abnormality, atraumatic   Eyes:          conjunctivae and sclerae normal, no   icterus   Throat:   no thrush, oral mucosa moist   Neck:   Supple, no adenopathy   Lungs:     Clear to auscultation bilaterally    Heart:    Regular rhythm and normal rate    Chest Wall:    No abnormalities observed   Abdomen:     Soft, nondistended,  nontender; normal bowel sounds   Extremities:  Right lower extremity amputation of the toes with bandage and wound VAC in place, not removed   Skin:   No bruising or rash   Psychiatric:  normal mood and insight     Results Review:  [x]  Laboratory   [x]  Radiology  []  Pathology      I reviewed the patient's new clinical results.    Results from last 7 days   Lab Units 12/19/21  0545 12/18/21  0714 12/16/21  0610   WBC 10*3/mm3 5.21 5.35 5.89   HEMOGLOBIN g/dL 11.6* 11.9* 11.2*   HEMATOCRIT % 35.3* 36.7* 34.8*   PLATELETS 10*3/mm3 148 150 132*     Results from last 7 days   Lab Units 12/19/21  0545 12/18/21  0714 12/16/21  0610   SODIUM mmol/L 137 136 134*   POTASSIUM mmol/L 3.7 4.2 4.5   CHLORIDE mmol/L 104 102 99   CO2 mmol/L 26.5 27.2 27.0   BUN mg/dL 12 10 12   CREATININE mg/dL 0.68* 0.66* 0.77   CALCIUM mg/dL 8.6 8.8 8.4*   BILIRUBIN mg/dL 0.4 0.6 0.6   ALK PHOS U/L 584* 563* 437*   ALT (SGPT) U/L 64* 60* 36   AST (SGOT) U/L 81* 75* 47*   GLUCOSE mg/dL 168* 135* 175*         No results found for: LIPASE    Radiology:  US Liver   Final Result      XR Foot 2 View Right   Final Result      MRI Foot Right With & Without Contrast   Final Result      XR Foot 3+ View Right   Final Result           Assessment/Plan     Patient Active Problem List   Diagnosis   • Diabetic ulcer of left heel associated with type 2 DM   • Acute osteomyelitis of left calcaneus    • Diabetic ulcer of left heel associated with type 2 DM   • Diabetic ulcer of right midfoot associated with type 2 DM   • Paroxysmal atrial fibrillation   • Essential hypertension   • Hyperlipidemia LDL goal <100   • Cellulitis and abscess of foot   • Lactic acidosis   • High alkaline phosphatase level        Plan:  I suspect the patient's alkaline phosphatase is related to osteomyelitis and recent amputation.  His AST and ALT have reportedly been elevated for quite some time and most likely represent nonalcoholic fatty liver disease.  I would send bone specific  alkaline phosphatase if not previously sent continue to monitor this he continues his recovery from amputation and osteomyelitis.      I discussed the patients findings and my recommendations with patient.    Justice Elizabeth MD

## 2021-12-21 VITALS
OXYGEN SATURATION: 99 % | TEMPERATURE: 98.4 F | WEIGHT: 315 LBS | HEART RATE: 59 BPM | DIASTOLIC BLOOD PRESSURE: 67 MMHG | RESPIRATION RATE: 18 BRPM | BODY MASS INDEX: 42.66 KG/M2 | SYSTOLIC BLOOD PRESSURE: 136 MMHG | HEIGHT: 72 IN

## 2021-12-21 PROBLEM — L02.212 ABSCESS OF BACK: Status: RESOLVED | Noted: 2021-12-20 | Resolved: 2021-12-21

## 2021-12-21 PROBLEM — E87.20 LACTIC ACIDOSIS: Status: RESOLVED | Noted: 2021-12-01 | Resolved: 2021-12-21

## 2021-12-21 LAB
ALP BONE CFR SERPL: 30 % (ref 12–68)
ALP INTEST CFR SERPL: 0 % (ref 0–18)
ALP LIVER CFR SERPL: 70 % (ref 13–88)
ALP SERPL-CCNC: 623 IU/L (ref 44–121)
GLUCOSE BLDC GLUCOMTR-MCNC: 125 MG/DL (ref 70–99)
GLUCOSE BLDC GLUCOMTR-MCNC: 218 MG/DL (ref 70–99)

## 2021-12-21 PROCEDURE — 63710000001 INSULIN LISPRO (HUMAN) PER 5 UNITS: Performed by: PODIATRIST

## 2021-12-21 PROCEDURE — 82962 GLUCOSE BLOOD TEST: CPT

## 2021-12-21 RX ORDER — PREGABALIN 25 MG/1
25 CAPSULE ORAL EVERY 12 HOURS SCHEDULED
Qty: 30 CAPSULE | Refills: 0 | Status: ON HOLD | OUTPATIENT
Start: 2021-12-21 | End: 2022-10-01

## 2021-12-21 RX ORDER — AMOXICILLIN AND CLAVULANATE POTASSIUM 875; 125 MG/1; MG/1
1 TABLET, FILM COATED ORAL 2 TIMES DAILY
Qty: 20 TABLET | Refills: 0 | Status: SHIPPED | OUTPATIENT
Start: 2021-12-21 | End: 2021-12-31

## 2021-12-21 RX ORDER — HYDROCODONE BITARTRATE AND ACETAMINOPHEN 5; 325 MG/1; MG/1
1 TABLET ORAL EVERY 4 HOURS PRN
Qty: 18 TABLET | Refills: 0 | Status: SHIPPED | OUTPATIENT
Start: 2021-12-21 | End: 2022-11-08

## 2021-12-21 RX ADMIN — METOPROLOL SUCCINATE 25 MG: 25 TABLET, EXTENDED RELEASE ORAL at 08:06

## 2021-12-21 RX ADMIN — SODIUM CHLORIDE, PRESERVATIVE FREE 10 ML: 5 INJECTION INTRAVENOUS at 08:07

## 2021-12-21 RX ADMIN — INSULIN LISPRO 4 UNITS: 100 INJECTION, SOLUTION INTRAVENOUS; SUBCUTANEOUS at 11:32

## 2021-12-21 RX ADMIN — BUPROPION HYDROCHLORIDE 150 MG: 150 TABLET, EXTENDED RELEASE ORAL at 08:07

## 2021-12-21 RX ADMIN — APIXABAN 5 MG: 5 TABLET, FILM COATED ORAL at 08:06

## 2021-12-21 RX ADMIN — PREGABALIN 25 MG: 25 CAPSULE ORAL at 08:07

## 2021-12-21 RX ADMIN — FAMOTIDINE 40 MG: 20 TABLET ORAL at 08:07

## 2021-12-21 RX ADMIN — LISINOPRIL 20 MG: 20 TABLET ORAL at 08:06

## 2021-12-21 NOTE — SIGNIFICANT NOTE
12/21/21 1400   Wound 12/02/21 Right anterior foot Incision   Placement Date: 12/02/21   Side: Right  Orientation: anterior  Location: foot  Primary Wound Type: Incision   Dressing Appearance intact   Base red; moist; granulating; slough; bleeding   Periwound macerated; gray; redness; pink; edematous   Edges open; irregular   Wound Length (cm) 5.6 cm   Wound Width (cm) 14.4 cm   Wound Depth (cm) 2.7 cm   Drainage Characteristics/Odor serosanguineous   Drainage Amount moderate   Care, Wound cleansed with; irrigated with; sterile normal saline   Dressing Care dressing applied; dressing moistened; gauze, wet-to-moist; gauze; non-adherent   Wound Check / Follow-up. Patient seen today to remove wound vac as patient will be discharging to rehab Facility. Rehab Facility is aware of Wound Vac need. Per  Wound Vac will be available tomorrow at facility. Wound Vac dressing removed. Wound bed with red moist granulating tissue along with some slough and fibrous tissue noted to wound edges medially and laterally. Patient also continues to have dusky area of posterior flap from middle of foot extending medially. Tissue there is purple and gray with dusky even noted to inner portion of flap. Wound bed with small amount of bleeding controlled with gauze pressure. Wound filled with NS moistened gauze. Entire flap area covered with non-adherent gauze and then dry gauze applied and all secured with gauze roll. Recommending patient to have BID dressing changes until wound vac can be reapplied. Patient awaiting discharge at this time.   Bettye Pope RN

## 2021-12-21 NOTE — DISCHARGE SUMMARY
[facility]         DISCHARGE SUMMARY    Patient Name: Jose Shaikh  : 1960  MRN: [id number]    Date of Admission: 2021  Date of Discharge:   Primary Care Physician: Corazon Gr MD    Consults     Date and Time Order Name Status Description    2021 11:26 AM Inpatient Gastroenterology Consult Completed     2021 11:14 AM Inpatient General Surgery Consult      12/15/2021  7:24 AM Inpatient Vascular Surgery Consult      2021  2:03 PM Inpatient Podiatry Consult            Presenting Problem:   Cellulitis and abscess of foot [L03.119, L02.619]    Active and Resolved Hospital Problems:  Active Hospital Problems    Diagnosis POA   • **Cellulitis and abscess of foot [L03.119, L02.619] Yes   • High alkaline phosphatase level [R74.8] No   • Essential hypertension [I10] Yes   • Diabetic ulcer of right midfoot associated with type 2 DM [E11.621, L97.415] Yes   • Diabetic ulcer of left heel associated with type 2 DM [E11.621, L97.422] Yes      Resolved Hospital Problems    Diagnosis POA   • Abscess of back [L02.212] No   • Lactic acidosis [E87.2] Yes         Hospital Course     Hospital Course:  Jose Shaikh is a 61 y.o. male admitted to hospital for right foot ulcer and nonhealing wound.  Patient was in wound clinic and he was also seeing podiatrist Dr. Leyva.  Despite aggressive efforts wound was not healing.  Patient was admitted started on IV antibiotics.  Further work-up revealed osteomyelitis.  Patient was taken to the OR by  podiatrist.  Patient had surgery on  and had transmetatarsal amputation of the right foot.  Patient had a very slow recovery.  Had continued infection in the wound and had aggressive antibiotics administered.  Further he was taken to the OR again on 10 December by Dr. Leyva For incision and drainage.    Subsequent to that patient remained stable.  He was given antibiotics and blood sugars were controlled.  He  also has difficulty walking as he is not supposed to bear weight on the foot.  He does have problems with other foot also.  He developed some abscess on his back he has chronic recurrent abscess in the back and general surgeon Dr. Orosco was consulted he drained it yesterday.  Patient is doing better today he will be discharged to nursing home for inpatient rehab.  He will be placed back on antibiotics for 10-day for abscess and the foot infection which is all improved now.  Continue PT and OT and wound care with wound VAC.  Follow-up with podiatrist in 1 week    DISCHARGE Follow Up Recommendations for labs and diagnostics:   Discharged to nursing home.  Heart healthy diet 1800 ADA.  PT and OT      Day of Discharge     Vital Signs:  Temp:  [97.1 °F (36.2 °C)-98.6 °F (37 °C)] 97.6 °F (36.4 °C)  Heart Rate:  [62-92] 76  Resp:  [16-18] 16  BP: ()/(37-67) 105/67    Physical Exam:    Elderly male not in acute distress obese.  Heart is regular lungs clear  Right posterior back around the paraspinal area abscess drain in surgical dressing.  Extremities with minimal edema right foot under surgical dressing and wound VAC.  No calf tenderness.  Neuro awake alert and oriented        Pertinent  and/or Most Recent Results     LAB RESULTS:      Lab 12/20/21  0616 12/19/21  0545 12/18/21  0714 12/16/21  0610   WBC 4.62 5.21 5.35 5.89   HEMOGLOBIN 11.2* 11.6* 11.9* 11.2*   HEMATOCRIT 34.1* 35.3* 36.7* 34.8*   PLATELETS 138* 148 150 132*   NEUTROS ABS 3.13 3.42 3.82 4.24   IMMATURE GRANS (ABS) 0.03 0.01 0.02 0.01   LYMPHS ABS 0.86 0.93 0.85 0.91   MONOS ABS 0.42 0.66 0.51 0.58   EOS ABS 0.14 0.16 0.13 0.13   MCV 86.8 85.3 86.6 85.7   LACTATE  --   --   --  0.7         Lab 12/20/21  0616 12/19/21  0545 12/18/21  0714 12/16/21  0610   SODIUM 137 137 136 134*   POTASSIUM 4.4 3.7 4.2 4.5   CHLORIDE 103 104 102 99   CO2 26.6 26.5 27.2 27.0   ANION GAP 7.4 6.5 6.8 8.0   BUN 12 12 10 12   CREATININE 0.61* 0.68* 0.66* 0.77   GLUCOSE  227* 168* 135* 175*   CALCIUM 8.4* 8.6 8.8 8.4*         Lab 12/20/21  0616 12/19/21  0545 12/18/21  0714 12/16/21  0610   TOTAL PROTEIN 6.1 6.4 6.5 6.1   ALBUMIN 2.50* 2.70* 2.80* 2.40*   GLOBULIN 3.6 3.7 3.7 3.7   ALT (SGPT) 55* 64* 60* 36   AST (SGOT) 56* 81* 75* 47*   BILIRUBIN 0.5 0.4 0.6 0.6   ALK PHOS 489* 584* 563* 437*                     Brief Urine Lab Results     None        Microbiology Results (last 10 days)     Procedure Component Value - Date/Time    COVID PRE-OP / PRE-PROCEDURE SCREENING ORDER (NO ISOLATION) - Swab, Nasopharynx [[id number]]  (Normal) Collected: 12/20/21 1345    Lab Status: Final result Specimen: Swab from Nasopharynx Updated: 12/20/21 1839    Narrative:      The following orders were created for panel order COVID PRE-OP / PRE-PROCEDURE SCREENING ORDER (NO ISOLATION) - Swab, Nasopharynx.  Procedure                               Abnormality         Status                     ---------                               -----------         ------                     COVID-19,APTIMA PANTHER(...[[id number]]  Normal              Final result                 Please view results for these tests on the individual orders.    COVID-19,APTIMA PANTHER(LUDIN),[facility], NP/OP SWAB IN UTM/VTM/SALINE TRANSPORT MEDIA,24 HR TAT - Swab, Nasopharynx [[id number]]  (Normal) Collected: 12/20/21 1345    Lab Status: Final result Specimen: Swab from Nasopharynx Updated: 12/20/21 1839     COVID19 Not Detected    Narrative:      Fact sheet for providers: https://www.fda.gov/media/957903/download     Fact sheet for patients: https://www.fda.gov/media/059318/download    Test performed by RT PCR.               Results for orders placed during the hospital encounter of 12/01/21    Doppler Ankle Brachial Index Single Level CAR    Interpretation Summary  · Left Conclusion: The left RIZWANA is normal. Waveforms are consistent with tib-peroneal disease. Normal digital pressures.  · Right Conclusion: The right RIZWANA is normal.  Normal digital pressures.      Results for orders placed during the hospital encounter of 12/01/21    Doppler Ankle Brachial Index Single Level CAR    Interpretation Summary  · Left Conclusion: The left RIZWANA is normal. Waveforms are consistent with tib-peroneal disease. Normal digital pressures.  · Right Conclusion: The right RIZWANA is normal. Normal digital pressures.          Labs Pending at Discharge:  Pending Labs     Order Current Status    Alkaline Phosphatase, Bone Specific In process    Alkaline Phosphatase, Isoenzymes In process            Discharge Details        Discharge Medications      New Medications      Instructions Start Date   amoxicillin-clavulanate 875-125 MG per tablet  Commonly known as: Augmentin   1 tablet, Oral, 2 Times Daily      HYDROcodone-acetaminophen 5-325 MG per tablet  Commonly known as: Norco   1 tablet, Oral, Every 4 Hours PRN      pregabalin 25 MG capsule  Commonly known as: LYRICA   25 mg, Oral, Every 12 Hours Scheduled         Continue These Medications      Instructions Start Date   alfuzosin 10 MG 24 hr tablet  Commonly known as: UROXATRAL   10 mg, Oral, Daily      buPROPion  MG 24 hr tablet  Commonly known as: WELLBUTRIN XL   300 mg, Oral, Every Early Morning      citalopram 10 MG tablet  Commonly known as: CeleXA   10 mg, Oral, Nightly      Eliquis 5 MG tablet tablet  Generic drug: apixaban   5 mg, Oral, 2 times daily      glucose blood test strip   Test blood sugar 3 times a day      insulin aspart 100 UNIT/ML injection  Commonly known as: NovoLOG   20 Units, Subcutaneous, 3 Times Daily Before Meals, Take up to 20 units as instructed      insulin detemir 100 UNIT/ML injection  Commonly known as: LEVEMIR   50 Units, Subcutaneous, Nightly, Take 40 units and adjust to 50 as instructed      lisinopril 20 MG tablet  Commonly known as: PRINIVIL,ZESTRIL   20 mg, Oral, Daily      metFORMIN 1000 MG tablet  Commonly known as: GLUCOPHAGE   1,000 mg, Oral, 2 times daily       metoprolol succinate XL 50 MG 24 hr tablet  Commonly known as: TOPROL-XL   50 mg, Oral, Daily      sertraline 100 MG tablet  Commonly known as: ZOLOFT   100 mg, Daily      Zocor 20 MG tablet  Generic drug: simvastatin   20 mg, Oral, Nightly             Allergies   Allergen Reactions   • Adhesive Tape Rash         Discharge Disposition:    Skilled Nursing Facility (DC - External)    Diet:    Heart healthy 1800 ADA    Discharge Activity:     As tolerated with assistance      Future Appointments   Date Time Provider Department Center   1/24/2022  3:00 PM Kami Garcia APRN [facility]   3/1/2022  2:30 PM Eri Herrmann APRN [facility] CD [facility]       Additional Instructions for the Follow-ups that You Need to Schedule     Discharge Follow-up with PCP   As directed       Currently Documented PCP:    Corazon Gr MD    PCP Phone Number:    [phone]     Follow Up Details: Dr. Gr post discharge from nursing home         Discharge Follow-up with Specified Provider: Dr. Leyva for foot care in 1 week; 1 Week   As directed      To: Dr. Leyva for foot care in 1 week    Follow Up: 1 Week               Time spent on Discharge including face to face service: 33 minutes.            I have dictated this note utilizing Dragon Dictation.             Please note that portions of this note were completed with a voice recognition program.             Part of this note may be an electronic transcription/translation of spoken language to printed text         using the Dragon Dictation System.       Electronically signed by Yung Grijalva MD, 12/21/21, 9:25 AM EST.

## 2021-12-21 NOTE — PROGRESS NOTES
[facility]     Progress Note    Patient Name: Jose Shaikh  : 1960  MRN: [id number]  Primary Care Physician:  Corazon Gr MD  Date of admission: 2021      Subjective   Brief summary.  Follow-up on osteomyelitis of the foot post amputation      HPI:  Patient feeling weak.  Scheduled to have bedside drainage of abscess in the back.  No abdominal pain.  No fever    Review of Systems     No hypo or hyperglycemia.  No chest pain or shortness of breath      Objective     Vitals:   Temp:  [97.1 °F (36.2 °C)-98.7 °F (37.1 °C)] 98.2 °F (36.8 °C)  Heart Rate:  [62-92] 64  Resp:  [16-18] 18  BP: ()/(37-72) 100/61    Physical Exam :     Elderly male not in acute distress.  Heart regular lungs clear.  Abdomen obese and soft.  Extremities trace of edema.  Right foot with wound VAC      Result Review:  I have personally reviewed the results from the time of this admission to 2021 19:42 EST and agree with these findings:  []  Laboratory  []  Microbiology  []  Radiology  []  EKG/Telemetry   []  Cardiology/Vascular   []  Pathology  []  Old records  []  Other:           Assessment / Plan       Active Hospital Problems:  Active Hospital Problems    Diagnosis    • **Cellulitis and abscess of foot    • Abscess of back    • High alkaline phosphatase level    • Lactic acidosis    • Essential hypertension    • Diabetic ulcer of right midfoot associated with type 2 DM    • Diabetic ulcer of left heel associated with type 2 DM        Plan:   Patient seen by general surgeon according to him.  Plan for surgery today.  Continue current meds.  Check labs in a.m..  Possible discharge tomorrow to nursing home when bed available       DVT prophylaxis:  Medical DVT prophylaxis orders are present.    CODE STATUS:   Code Status (Patient has no pulse and is not breathing): CPR (Attempt to Resuscitate)  Medical Interventions (Patient has pulse or is breathing): Full Support            Electronically signed by Yung  MD Valentine, 12/20/21, 7:40 PM EST.

## 2021-12-21 NOTE — PLAN OF CARE
Goal Outcome Evaluation:            Change pt back abscess dressing. Pt has some pain through out the night. PO medication was given and work for patient.

## 2021-12-23 LAB — ALP BONE SERPL-MCNC: 55.1 UG/L (ref 7.5–25.4)

## 2021-12-30 ENCOUNTER — OFFICE VISIT (OUTPATIENT)
Dept: PODIATRY | Facility: CLINIC | Age: 61
End: 2021-12-30

## 2021-12-30 VITALS
SYSTOLIC BLOOD PRESSURE: 105 MMHG | TEMPERATURE: 96.9 F | OXYGEN SATURATION: 95 % | HEIGHT: 72 IN | HEART RATE: 53 BPM | WEIGHT: 315 LBS | BODY MASS INDEX: 42.66 KG/M2 | DIASTOLIC BLOOD PRESSURE: 71 MMHG

## 2021-12-30 DIAGNOSIS — Z79.4 TYPE 2 DIABETES MELLITUS WITH DIABETIC POLYNEUROPATHY, WITH LONG-TERM CURRENT USE OF INSULIN (HCC): ICD-10-CM

## 2021-12-30 DIAGNOSIS — Z89.431 HISTORY OF TRANSMETATARSAL AMPUTATION OF RIGHT FOOT (HCC): ICD-10-CM

## 2021-12-30 DIAGNOSIS — L89.893 PRESSURE INJURY OF RIGHT FOOT, STAGE 3 (HCC): Primary | ICD-10-CM

## 2021-12-30 DIAGNOSIS — M79.671 FOOT PAIN, RIGHT: ICD-10-CM

## 2021-12-30 DIAGNOSIS — E11.42 TYPE 2 DIABETES MELLITUS WITH DIABETIC POLYNEUROPATHY, WITH LONG-TERM CURRENT USE OF INSULIN (HCC): ICD-10-CM

## 2021-12-30 PROCEDURE — 99024 POSTOP FOLLOW-UP VISIT: CPT | Performed by: PODIATRIST

## 2021-12-30 PROCEDURE — 11042 DBRDMT SUBQ TIS 1ST 20SQCM/<: CPT | Performed by: PODIATRIST

## 2021-12-30 NOTE — PROGRESS NOTES
[facility] - PODIATRY    Today's Date: 12/30/21    Patient Name: Jose Shaikh  MRN: [id number]  CSN: [id number]  PCP: Corazon Gr MD  Referring Provider: No ref. provider found    SUBJECTIVE     Chief Complaint   Patient presents with   • Right Foot - Post-op, Amputation     HPI: Jose Shaikh, a 61 y.o.male, presents to clinic.    Procedure: I&D of right midfoot  Date: 10 December 2021     Procedure: Right midfoot amputation  Date: 2 December 2021     Patient states he is  doing well.     Patient states that he is currently in a rehabilitation facility.  Him and the accompanying staff member state that the patient is getting back wound dressings to his right foot on Mondays, Wednesdays, and Fridays.    Patient denies any fevers, chills, nausea, vomiting, shortness of breathe, nor any other constitutional signs nor symptoms.        Past Medical History:   Diagnosis Date   • Absence of toe of right foot    • Acute osteomyelitis of left calcaneus  8/18/2021   • Anxiety and depression    • Arthritis    • Claustrophobia    • Corns and callus    • Diabetic ulcer of left heel associated with type 2 DM 8/18/2021   • Diabetic ulcer of left heel associated with type 2 DM 7/6/2021   • Diabetic ulcer of right midfoot associated with type 2 DM 8/18/2021   • Difficulty walking    • Essential hypertension 8/31/2021   • Hammertoe    • Hyperlipidemia LDL goal <100 8/31/2021   • Ingrown toenail    • Obesity    • Paroxysmal atrial fibrillation 8/31/2021   • Polyneuropathy    • Pressure ulcer, stage 1    • Tinea unguium    • Type 2 diabetes mellitus with polyneuropathy      Past Surgical History:   Procedure Laterality Date   • CYST REMOVAL      center of back; benign   • INCISION AND DRAINAGE ABSCESS      back   • INCISION AND DRAINAGE LEG Right 12/10/2021    Procedure: INCISION AND DRAINAGE LOWER EXTREMITY;  Surgeon: Ash Leyva DPM;  Location: [facility] OR;  Service: Podiatry;  Laterality:  Right;   • OTHER SURGICAL HISTORY      Surgical clips left foot   • TOE SURGERY Right     Removal of 5th toe   • TRANS METATARSAL AMPUTATION Right 2021    Procedure: AMPUTATION TRANS METATARSAL;  Surgeon: Ash Leyva DPM;  Location: [facility] MAIN OR;  Service: Podiatry;  Laterality: Right;   • WRIST SURGERY Left     repair of injury     Family History   Problem Relation Age of Onset   • Heart disease Mother    • Heart disease Father    • Cancer Father         Unspecified   • Heart disease Brother      Social History     Socioeconomic History   • Marital status: Single   Tobacco Use   • Smoking status: Former Smoker     Packs/day: 0.00     Years: 1.00     Pack years: 0.00     Quit date: 1991     Years since quittin.3   • Smokeless tobacco: Never Used   • Tobacco comment: quit at age 30   Vaping Use   • Vaping Use: Never used   Substance and Sexual Activity   • Alcohol use: Yes     Comment: Rare   • Drug use: Yes     Types: Marijuana     Comment: Daily   • Sexual activity: Defer     Allergies   Allergen Reactions   • Adhesive Tape Rash     Current Outpatient Medications   Medication Sig Dispense Refill   • alfuzosin (UROXATRAL) 10 MG 24 hr tablet Take 10 mg by mouth Daily.     • amoxicillin-clavulanate (Augmentin) 875-125 MG per tablet Take 1 tablet by mouth 2 (Two) Times a Day for 10 days. 20 tablet 0   • apixaban (Eliquis) 5 MG tablet tablet Take 5 mg by mouth 2 (two) times a day.     • buPROPion XL (WELLBUTRIN XL) 300 MG 24 hr tablet Take 300 mg by mouth Every Morning.     • citalopram (CeleXA) 10 MG tablet Take 10 mg by mouth Every Night.     • glucose blood test strip Test blood sugar 3 times a day 100 each 5   • HYDROcodone-acetaminophen (Norco) 5-325 MG per tablet Take 1 tablet by mouth Every 4 (Four) Hours As Needed for Moderate Pain  for up to 18 doses. 18 tablet 0   • insulin aspart (NovoLOG) 100 UNIT/ML injection Inject 20 Units under the skin into the appropriate area as directed 3  (Three) Times a Day Before Meals. Take up to 20 units as instructed 20 mL 3   • insulin detemir (LEVEMIR) 100 UNIT/ML injection Inject 50 Units under the skin into the appropriate area as directed Every Night. Take 40 units and adjust to 50 as instructed 20 mL 3   • lisinopril (PRINIVIL,ZESTRIL) 20 MG tablet Take 20 mg by mouth Daily.     • metFORMIN (GLUCOPHAGE) 1000 MG tablet Take 1,000 mg by mouth 2 (two) times a day.     • metoprolol succinate XL (TOPROL-XL) 50 MG 24 hr tablet Take 50 mg by mouth Daily.     • pregabalin (LYRICA) 25 MG capsule Take 1 capsule by mouth Every 12 (Twelve) Hours for 15 days. 30 capsule 0   • simvastatin (Zocor) 20 MG tablet Take 20 mg by mouth Every Night.     • sertraline (ZOLOFT) 100 MG tablet Take 100 mg by mouth Daily. (Patient not taking: Reported on 12/2/2021)       No current facility-administered medications for this visit.     Review of Systems    OBJECTIVE     Vitals:    12/30/21 1431   BP: 105/71   Pulse: 53   Temp: 96.9 °F (36.1 °C)   SpO2: 95%       Patient seen in no apparent distress.      PHYSICAL EXAM:     Neurovascular status intact and unchanged    Orthopedic exam: right transmetatarsal amputation    Upon removal of the wound VAC dressing on the right midfoot, healthy granular tissue was seen on the majority of the wound.  There is necrotic tissue on the plantar medial aspect of the wound.  Stage 3 ulceration present.  Nonviable tissue is present.  No surrounding, edema, erythema, lymphangitis, fluctuance, nor signs of infection.  Bloody-serous drainage present.  100 mm x 80 mm x 10 mm.  Does not probe to bone.  This area was debrided via excisional subcutaneous debridement with a 10 scalpel blade.  Post debridement measurements were 110 mm x 100 mm x 12 mm in depth.    ASSESSMENT/PLAN     Diagnoses and all orders for this visit:    1. Pressure injury of right foot, stage 3 (HCC) (Primary)    2. Foot pain, right    3. Type 2 diabetes mellitus with diabetic  polyneuropathy, with long-term current use of insulin (HCC)    4. History of transmetatarsal amputation of right foot (HCC)        Comprehensive lower extremity examination and evaluation was performed.    Discussed findings and treatment plan including risks, benefits, and treatment options with patient in detail. Patient agreed with treatment plan.    Continue wound VAC dressing as currently prescribed.    Follow-up appointment with Dr. Shea Daniels at the wound care center as scheduled in 2 weeks.    The patient states understanding and agreement with this plan.    An After Visit Summary was printed and given to the patient at discharge, including (if requested) any available informative/educational handouts regarding diagnosis, treatment, or medications. All questions were answered to patient/family satisfaction. Should symptoms fail to improve or worsen they agree to call or return to clinic or to go to the Emergency Department. Discussed the importance of following up with any needed screening tests/labs/specialist appointments and any requested follow-up recommended by me today. Importance of maintaining follow-up discussed and patient accepts that missed appointments can delay diagnosis and potentially lead to worsening of conditions.    Return if symptoms worsen or fail to improve., or sooner if acute issues arise.    This document has been electronically signed by Ash Leyva DPM on December 30, 2021 15:03 EST

## 2022-01-12 ENCOUNTER — OFFICE VISIT (OUTPATIENT)
Dept: WOUND CARE | Facility: HOSPITAL | Age: 62
End: 2022-01-12

## 2022-01-12 VITALS
HEART RATE: 83 BPM | DIASTOLIC BLOOD PRESSURE: 70 MMHG | BODY MASS INDEX: 44.1 KG/M2 | HEIGHT: 71 IN | WEIGHT: 315 LBS | TEMPERATURE: 96.7 F | SYSTOLIC BLOOD PRESSURE: 106 MMHG | RESPIRATION RATE: 18 BRPM

## 2022-01-12 DIAGNOSIS — Z79.01 ANTICOAGULATED: ICD-10-CM

## 2022-01-12 DIAGNOSIS — S98.311A: ICD-10-CM

## 2022-01-12 DIAGNOSIS — E11.42 DIABETIC POLYNEUROPATHY ASSOCIATED WITH TYPE 2 DIABETES MELLITUS: ICD-10-CM

## 2022-01-12 DIAGNOSIS — L97.424 DIABETIC ULCER OF LEFT HEEL ASSOCIATED WITH TYPE 2 DIABETES MELLITUS, WITH NECROSIS OF BONE: Primary | ICD-10-CM

## 2022-01-12 DIAGNOSIS — E11.621 DIABETIC ULCER OF LEFT HEEL ASSOCIATED WITH TYPE 2 DIABETES MELLITUS, WITH NECROSIS OF BONE: Primary | ICD-10-CM

## 2022-01-12 DIAGNOSIS — T81.31XA POSTOPERATIVE WOUND DEHISCENCE, INITIAL ENCOUNTER: ICD-10-CM

## 2022-01-12 PROCEDURE — 11042 DBRDMT SUBQ TIS 1ST 20SQCM/<: CPT | Performed by: EMERGENCY MEDICINE

## 2022-01-12 PROCEDURE — 87147 CULTURE TYPE IMMUNOLOGIC: CPT | Performed by: EMERGENCY MEDICINE

## 2022-01-12 PROCEDURE — 11043 DBRDMT MUSC&/FSCA 1ST 20/<: CPT | Performed by: EMERGENCY MEDICINE

## 2022-01-12 PROCEDURE — 11046 DBRDMT MUSC&/FSCA EA ADDL: CPT | Performed by: EMERGENCY MEDICINE

## 2022-01-12 PROCEDURE — 87205 SMEAR GRAM STAIN: CPT | Performed by: EMERGENCY MEDICINE

## 2022-01-12 PROCEDURE — 87186 SC STD MICRODIL/AGAR DIL: CPT | Performed by: EMERGENCY MEDICINE

## 2022-01-12 PROCEDURE — 87077 CULTURE AEROBIC IDENTIFY: CPT | Performed by: EMERGENCY MEDICINE

## 2022-01-12 PROCEDURE — 87070 CULTURE OTHR SPECIMN AEROBIC: CPT | Performed by: EMERGENCY MEDICINE

## 2022-01-12 NOTE — PROGRESS NOTES
CALLED [facility] AND SPOKE WITH ERIC (NURSE) ABOUT CONCERNS AND INSTRUCTIONS FOR PATIENT'S WOUND VAC. ADVISED TO PLEASE DO NOT LET ANY OF THE BLACK FOAM TOUCH ANY OF THE PATIENT'S GOOD SKIN, TO PLEASE PUT A WINDOW DRAPE DOWN TO PROTECT THE GOOD SKIN, AND THAT THE WOUND HAS HAD A LARGE HEMATOMA REMOVED FROM IT TODAY SO THAT IT IS IMPORTANT TO GET FOAM CUT TO GO INTO THE NEW OPENING THAT IS NOW THERE. ERIC VERBALIZED UNDERSTANDING.

## 2022-01-12 NOTE — SIGNIFICANT NOTE
Epithelial %: 0%    Exposed Bone: no    Exposed Tendon: no    Impression:  new    Short Term Goals: INCREASE GRANULATION AND DECREASE SIZE

## 2022-01-16 LAB
BACTERIA SPEC AEROBE CULT: ABNORMAL
GRAM STN SPEC: ABNORMAL

## 2022-01-17 DIAGNOSIS — T14.8XXA WOUND INFECTION: Primary | ICD-10-CM

## 2022-01-17 DIAGNOSIS — L08.9 WOUND INFECTION: Primary | ICD-10-CM

## 2022-01-17 RX ORDER — DOXYCYCLINE HYCLATE 100 MG/1
100 CAPSULE ORAL 2 TIMES DAILY
Qty: 28 CAPSULE | Refills: 0 | Status: SHIPPED | OUTPATIENT
Start: 2022-01-17 | End: 2022-01-31

## 2022-01-17 NOTE — PROGRESS NOTES
Chief Complaint  Wound Check (follow up after right foot surgery)    Subjective        History of Present Illness    Patient is a 60-year-old type II diabetic with neuropathy and insulin dependence.  He has been followed in the wound care clinic for nonhealing ulcers on his left heel and right distal midfoot. MRI showed osteomyelitis of the left heel. He had a PICC line placed 08/20/2021 and is undergoing IV antibiotics daily (ertapenem).  He recently started to undergo hyperbaric oxygen therapy for his osteomyelitis and is tolerating it well.  He is trying to control his sugars better and really watch what he eats.  He says he has a hard time offloading the wounds because he still has to do his ADLs and go to the laundromat etc.    He says he has been continuing to try to manage his dietary intake and eating more plant-based foods and cutting out fried foods and carbs as much as possible to improve his glucose control.  He has recently seen Kami Garcia for his diabetes and says he has already lost quite a bit of weight on his new medication and diet plan, but would like to lose another 50 pounds to start to really feel better he says.    He completed his full course of IV antibiotics and was undergoing HBO therapy.  Unfortunately he developed a severe infection of the right distal foot with worsening osteomyelitis and abscesses that resulted in a right midfoot amputation.  He originally got a TMA but then required a revision and got a more proximal amputation at the MTT joints.  There was not enough healthy tissue for coverage so he has a wound VAC and is currently at a nursing home.  It appears that they are not applying the wound VAC correctly.  Silvadene is being put in the left plantar foot wound.    He is not currently on any antibiotics.    Allergies:  Adhesive tape      Current Outpatient Medications:   •  alfuzosin (UROXATRAL) 10 MG 24 hr tablet, Take 10 mg by mouth Daily., Disp: , Rfl:   •  apixaban  (Eliquis) 5 MG tablet tablet, Take 5 mg by mouth 2 (two) times a day., Disp: , Rfl:   •  buPROPion XL (WELLBUTRIN XL) 300 MG 24 hr tablet, Take 300 mg by mouth Every Morning., Disp: , Rfl:   •  citalopram (CeleXA) 10 MG tablet, Take 10 mg by mouth Every Night., Disp: , Rfl:   •  doxycycline (VIBRAMYCIN) 100 MG capsule, Take 1 capsule by mouth 2 (Two) Times a Day for 14 days., Disp: 28 capsule, Rfl: 0  •  glucose blood test strip, Test blood sugar 3 times a day, Disp: 100 each, Rfl: 5  •  HYDROcodone-acetaminophen (Norco) 5-325 MG per tablet, Take 1 tablet by mouth Every 4 (Four) Hours As Needed for Moderate Pain  for up to 18 doses., Disp: 18 tablet, Rfl: 0  •  insulin aspart (NovoLOG) 100 UNIT/ML injection, Inject 20 Units under the skin into the appropriate area as directed 3 (Three) Times a Day Before Meals. Take up to 20 units as instructed, Disp: 20 mL, Rfl: 3  •  insulin detemir (LEVEMIR) 100 UNIT/ML injection, Inject 50 Units under the skin into the appropriate area as directed Every Night. Take 40 units and adjust to 50 as instructed, Disp: 20 mL, Rfl: 3  •  lisinopril (PRINIVIL,ZESTRIL) 20 MG tablet, Take 20 mg by mouth Daily., Disp: , Rfl:   •  metFORMIN (GLUCOPHAGE) 1000 MG tablet, Take 1,000 mg by mouth 2 (two) times a day., Disp: , Rfl:   •  metoprolol succinate XL (TOPROL-XL) 50 MG 24 hr tablet, Take 50 mg by mouth Daily., Disp: , Rfl:   •  pregabalin (LYRICA) 25 MG capsule, Take 1 capsule by mouth Every 12 (Twelve) Hours for 15 days., Disp: 30 capsule, Rfl: 0  •  sertraline (ZOLOFT) 100 MG tablet, Take 100 mg by mouth Daily. (Patient not taking: Reported on 12/2/2021), Disp: , Rfl:   •  simvastatin (Zocor) 20 MG tablet, Take 20 mg by mouth Every Night., Disp: , Rfl:     Past Medical History:   Diagnosis Date   • Absence of toe of right foot    • Acute osteomyelitis of left calcaneus  8/18/2021   • Anxiety and depression    • Arthritis    • Claustrophobia    • Corns and callus    • Diabetic ulcer of  left heel associated with type 2 DM 2021   • Diabetic ulcer of left heel associated with type 2 DM 2021   • Diabetic ulcer of right midfoot associated with type 2 DM 2021   • Difficulty walking    • Essential hypertension 2021   • Hammertoe    • Hyperlipidemia LDL goal <100 2021   • Ingrown toenail    • Obesity    • Paroxysmal atrial fibrillation 2021   • Polyneuropathy    • Pressure ulcer, stage 1    • Tinea unguium    • Type 2 diabetes mellitus with polyneuropathy      Past Surgical History:   Procedure Laterality Date   • CYST REMOVAL      center of back; benign   • INCISION AND DRAINAGE ABSCESS      back   • INCISION AND DRAINAGE LEG Right 12/10/2021    Procedure: INCISION AND DRAINAGE LOWER EXTREMITY;  Surgeon: Ash Leyva DPM;  Location: [facility];  Service: Podiatry;  Laterality: Right;   • OTHER SURGICAL HISTORY      Surgical clips left foot   • TOE SURGERY Right     Removal of 5th toe   • TRANS METATARSAL AMPUTATION Right 2021    Procedure: AMPUTATION TRANS METATARSAL;  Surgeon: Ash Leyva DPM;  Location: [facility] OR;  Service: Podiatry;  Laterality: Right;   • WRIST SURGERY Left     repair of injury     Social History     Socioeconomic History   • Marital status: Single   Tobacco Use   • Smoking status: Former Smoker     Packs/day: 0.00     Years: 1.00     Pack years: 0.00     Quit date: 1991     Years since quittin.4   • Smokeless tobacco: Never Used   • Tobacco comment: quit at age 30   Vaping Use   • Vaping Use: Never used   Substance and Sexual Activity   • Alcohol use: Yes     Comment: Rare   • Drug use: Yes     Types: Marijuana     Comment: Daily   • Sexual activity: Defer     Family History   Problem Relation Age of Onset   • Heart disease Mother    • Heart disease Father    • Cancer Father         Unspecified   • Heart disease Brother          Objective     Vitals:    22 1317   BP: 106/70   BP Location: Right arm  "  Patient Position: Sitting   Pulse: 83   Resp: 18   Temp: 96.7 °F (35.9 °C)   TempSrc: Temporal   Weight: (!) 155 kg (341 lb)   Height: 180.3 cm (71\")   PainSc:   7   PainLoc: Foot     Body mass index is 47.56 kg/m².    STEADI Fall Risk Assessment has not been completed.     Review of Systems     ROS:  No fevers, chills, sweats, or body aches.     I have reviewed the HPI and ROS as documented by MA/RN. Shea Daniels MD    Physical Exam     NAD, obese  Normocephalic, atraumatic  EOMI, no scleral icterus  No respiratory distress, no cough  AAOx3, pleasant, cooperative  Bilateral plantar wounds appear slightly improved although there is some blood noted at the right plantar wound.  Persistent recurrent callus again noted.  No erythema, purulence, induration, or fluctuance plantar wounds.  LLE wound on heel remains tender with pressure but not with sharp stimuli.      Right third and fourth toe with swelling and erythema.  The fourth toe is oozing some blood and there is also blood oozing from the plantar right foot wound.  I am able to express clots from the lateral aspect of the fourth toe.  Probing with sterile swab reveals that the plantar wound tunnels 3 cm dorsally and communicates with the fourth toe.  Flushing of the lateral toe wound results in saline coming out of the plantar wound.     See photos below for details.      RLE:            LLE:        Back:            Result Review :  The following data was reviewed by: Shea Daniels MD on 01/12/2022:    Prior notes and photos, recent admission, operative notes, follow-up notes, discharge summary, labs.    Procedure    Wound debridement  Performed by: Shea Daniels MD  Authorized by: Shea Daniels MD     Correct patient: Identification verified by two methods:     Verbally and Date of birth  Correct procedure/consent    Correct site/side    Correct equipment as applicable    How many wounds are you performing a debridement on?:  2  Wound 1:     Nursing " Documentation:     Wound Location:  Right foot amputation site  Measurements:     The total amount debrided on this wound was over 20 cm2.     Provider Documentation    Debridement type:  Sharp/excisional    Betadine      Other:  Sterile 10 blade scalpel    Tissue debrided:  Moderate    Type tissue:  Slough and Eschar    Level removed:  Muscle, Subcutaneous and Skin    Patient tolerance:  Good    Hemostasis achieved by:  Silver nitrate and Direct pressure    Wound Bed Post Debridement: See Photo      Description:  Total debridement area 6 x 15 cm = 90 cm²  Wound 2:     Wound Location:  Left plantar foot   Measurements:    The total amount debrided on this wound was under 20 cm2.      Provider Documentation:     Debridement type:  Sharp/Excisional    Betadine      Other:  Sterile 10 blade scalpel     None    Tissue debrided:  Small    Level removed:  Subcutaneous    Patient tolerance:  Good    Hemostasis achieved by:  Direct Pressure    Wound Bed Post Debridement: See Photo                            Assessment and Plan   Diagnoses and all orders for this visit:    1. 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 Amputation of right midfoot (HCC)    3. Postoperative wound dehiscence, initial encounter  -     Wound Culture - Wound, Foot, Right    4. Diabetic polyneuropathy associated with type 2 diabetes mellitus (HCC)    5. Anticoagulated    Other orders  -     Wound debridement      Continue wound VAC to right foot amputation site.  Nursing home is not applying the VAC appropriately.  Foam MUST be trimmed to go into the deep aspects of the wound along the plantar surface.  Do NOT allow VAC foam to come into contact with healthy skin!  The surrounding skin should be draped prior to cutting the foam and placing the VAC.  The foam MUST be cut to fit all surfaces of the open wound and NOT extend outward onto the skin.     Padmini into the deepest aspect  of the left plantar wound and fill the remaining tissue defect with gauze. Offload is much as possible.     Tight glucose control and strict diabetic diet.  Recheck 2 weeks.          Patient was given instructions and counseling regarding their condition or for health maintenance advice, as well as the wound care plan and recommendations. They understand and agree with the plan.  They will follow back up here in the clinic but are instructed to contact us in the interim should they have any new, returning, or worsening symptoms or concerns. Please see specific information pulled into the AVS if appropriate.       Follow Up   Return in about 2 weeks (around 1/26/2022) for Recheck.      Shea Daniels MD

## 2022-01-18 DIAGNOSIS — Z57.8 EMPLOYEE EXPOSURE TO BLOOD: Primary | ICD-10-CM

## 2022-01-18 SDOH — HEALTH STABILITY - PHYSICAL HEALTH: OCCUPATIONAL EXPOSURE TO OTHER RISK FACTORS: Z57.8

## 2022-01-21 ENCOUNTER — TELEPHONE (OUTPATIENT)
Dept: WOUND CARE | Facility: HOSPITAL | Age: 62
End: 2022-01-21

## 2022-01-21 NOTE — TELEPHONE ENCOUNTER
Received phone call from patient, stating he is being discharged from [facility] because they found out about his past criminal record. He was requesting documentation of where the hospital  had disclosed this information prior to accepting the patient.     Reached out to the social work on [facility] and she does not see any documentation that this information was disclosed, she also states criminal records are not discussed unless the rehab facility inquires about it.    Spoke to the  at [facility] ([facility]) and she confirmed they are discharging the patient tomorrow due to insurance not wanting to pay and they also discovered a couple days ago about the patient's past criminal record.  Inquired about doing an appeal, but due to the facilities policy against having residents with a felony they would not do it.  As a last resort the patient could be sent to [facility] to potentially be admitted, if not utilize the ER social workers. I stressed to [facility] that discharging the patient early to a home that is without proper resources for the patient to continue proper wound care would be detrimental.      Called Jose and informed him of the situation we are at now. He is attempting to get a hold of his sister-in-law to gather a gameplan to either proceed with going home or to send him to the hospital. Stressed to him that he ensures they have a home health referral and some sort of offloading devices (crutches or knee scooter) prior to discharge if he does go home and to contact us with any further assistance we could provide him. He verbalized understanding.

## 2022-01-24 ENCOUNTER — APPOINTMENT (OUTPATIENT)
Dept: DIABETES SERVICES | Facility: HOSPITAL | Age: 62
End: 2022-01-24

## 2022-01-26 ENCOUNTER — OFFICE VISIT (OUTPATIENT)
Dept: WOUND CARE | Facility: HOSPITAL | Age: 62
End: 2022-01-26

## 2022-01-26 VITALS — RESPIRATION RATE: 18 BRPM | DIASTOLIC BLOOD PRESSURE: 68 MMHG | TEMPERATURE: 97.5 F | SYSTOLIC BLOOD PRESSURE: 128 MMHG

## 2022-01-26 DIAGNOSIS — L97.424 DIABETIC ULCER OF LEFT HEEL ASSOCIATED WITH TYPE 2 DIABETES MELLITUS, WITH NECROSIS OF BONE: ICD-10-CM

## 2022-01-26 DIAGNOSIS — S98.311A: Primary | ICD-10-CM

## 2022-01-26 DIAGNOSIS — Z79.01 ANTICOAGULATED: ICD-10-CM

## 2022-01-26 DIAGNOSIS — E11.42 DIABETIC POLYNEUROPATHY ASSOCIATED WITH TYPE 2 DIABETES MELLITUS: ICD-10-CM

## 2022-01-26 DIAGNOSIS — E11.621 DIABETIC ULCER OF LEFT HEEL ASSOCIATED WITH TYPE 2 DIABETES MELLITUS, WITH NECROSIS OF BONE: ICD-10-CM

## 2022-01-26 DIAGNOSIS — T81.31XA POSTOPERATIVE WOUND DEHISCENCE, INITIAL ENCOUNTER: ICD-10-CM

## 2022-01-26 PROCEDURE — G0463 HOSPITAL OUTPT CLINIC VISIT: HCPCS | Performed by: EMERGENCY MEDICINE

## 2022-01-26 PROCEDURE — 99213 OFFICE O/P EST LOW 20 MIN: CPT | Performed by: EMERGENCY MEDICINE

## 2022-01-26 PROCEDURE — 11042 DBRDMT SUBQ TIS 1ST 20SQCM/<: CPT | Performed by: EMERGENCY MEDICINE

## 2022-01-26 NOTE — SIGNIFICANT NOTE
Epithelial %: %    Exposed Bone: no    Exposed Tendon: no    Impression: improved    Short Term Goals: DECREASE IN SIZE, INCREASE GRANULATION.

## 2022-01-26 NOTE — SIGNIFICANT NOTE
Epithelial %: 50-75%    Exposed Bone: no    Exposed Tendon: no    Impression: improved    Short Term Goals: DECREASE IN SIZE, INCREASE IN GRANULATION

## 2022-01-26 NOTE — PROGRESS NOTES
Chief Complaint  No chief complaint on file.    Subjective        History of Present Illness    Patient is a 60-year-old type II diabetic with neuropathy and insulin dependence.  He has been followed in the wound care clinic for nonhealing ulcers on his left heel and right distal midfoot. MRI showed osteomyelitis of the left heel. He had a PICC line placed 08/20/2021 and is undergoing IV antibiotics daily (ertapenem).  He recently started to undergo hyperbaric oxygen therapy for his osteomyelitis and is tolerating it well.  He is trying to control his sugars better and really watch what he eats.  He says he has a hard time offloading the wounds because he still has to do his ADLs and go to the laundromat etc.    He says he has been continuing to try to manage his dietary intake and eating more plant-based foods and cutting out fried foods and carbs as much as possible to improve his glucose control.  He has recently seen Kami Garcia for his diabetes and says he has already lost quite a bit of weight on his new medication and diet plan, but would like to lose another 50 pounds to start to really feel better he says.    He completed his full course of IV antibiotics and was undergoing HBO therapy.  Unfortunately he developed a severe infection of the right distal foot with worsening osteomyelitis and abscesses that resulted in a right midfoot amputation.  He originally got a TMA but then required a revision and got a more proximal amputation at the MTT joints.  There was not enough healthy tissue for coverage so he has a wound VAC and is currently at a nursing home.  It appears that they are still not applying the wound VAC correctly.  Silvadene is being put in the left plantar foot wound although I had requested Padmini to the base of the wound.    He is not currently on any antibiotics.    Allergies:  Adhesive tape      Current Outpatient Medications:   •  alfuzosin (UROXATRAL) 10 MG 24 hr tablet, Take 10 mg by  mouth Daily., Disp: , Rfl:   •  apixaban (Eliquis) 5 MG tablet tablet, Take 5 mg by mouth 2 (two) times a day., Disp: , Rfl:   •  buPROPion XL (WELLBUTRIN XL) 300 MG 24 hr tablet, Take 300 mg by mouth Every Morning., Disp: , Rfl:   •  citalopram (CeleXA) 10 MG tablet, Take 10 mg by mouth Every Night., Disp: , Rfl:   •  doxycycline (VIBRAMYCIN) 100 MG capsule, Take 1 capsule by mouth 2 (Two) Times a Day for 14 days., Disp: 28 capsule, Rfl: 0  •  glucose blood test strip, Test blood sugar 3 times a day, Disp: 100 each, Rfl: 5  •  HYDROcodone-acetaminophen (Norco) 5-325 MG per tablet, Take 1 tablet by mouth Every 4 (Four) Hours As Needed for Moderate Pain  for up to 18 doses., Disp: 18 tablet, Rfl: 0  •  insulin aspart (NovoLOG) 100 UNIT/ML injection, Inject 20 Units under the skin into the appropriate area as directed 3 (Three) Times a Day Before Meals. Take up to 20 units as instructed, Disp: 20 mL, Rfl: 3  •  insulin detemir (LEVEMIR) 100 UNIT/ML injection, Inject 50 Units under the skin into the appropriate area as directed Every Night. Take 40 units and adjust to 50 as instructed, Disp: 20 mL, Rfl: 3  •  lisinopril (PRINIVIL,ZESTRIL) 20 MG tablet, Take 20 mg by mouth Daily., Disp: , Rfl:   •  metFORMIN (GLUCOPHAGE) 1000 MG tablet, Take 1,000 mg by mouth 2 (two) times a day., Disp: , Rfl:   •  metoprolol succinate XL (TOPROL-XL) 50 MG 24 hr tablet, Take 50 mg by mouth Daily., Disp: , Rfl:   •  pregabalin (LYRICA) 25 MG capsule, Take 1 capsule by mouth Every 12 (Twelve) Hours for 15 days., Disp: 30 capsule, Rfl: 0  •  sertraline (ZOLOFT) 100 MG tablet, Take 100 mg by mouth Daily. (Patient not taking: Reported on 12/2/2021), Disp: , Rfl:   •  simvastatin (Zocor) 20 MG tablet, Take 20 mg by mouth Every Night., Disp: , Rfl:     Past Medical History:   Diagnosis Date   • Absence of toe of right foot    • Acute osteomyelitis of left calcaneus  8/18/2021   • Anxiety and depression    • Arthritis    • Claustrophobia    •  Corns and callus    • Diabetic ulcer of left heel associated with type 2 DM 2021   • Diabetic ulcer of left heel associated with type 2 DM 2021   • Diabetic ulcer of right midfoot associated with type 2 DM 2021   • Difficulty walking    • Essential hypertension 2021   • Hammertoe    • Hyperlipidemia LDL goal <100 2021   • Ingrown toenail    • Obesity    • Paroxysmal atrial fibrillation 2021   • Polyneuropathy    • Pressure ulcer, stage 1    • Tinea unguium    • Type 2 diabetes mellitus with polyneuropathy      Past Surgical History:   Procedure Laterality Date   • CYST REMOVAL      center of back; benign   • INCISION AND DRAINAGE ABSCESS      back   • INCISION AND DRAINAGE LEG Right 12/10/2021    Procedure: INCISION AND DRAINAGE LOWER EXTREMITY;  Surgeon: Ash Leyva DPM;  Location: [facility];  Service: Podiatry;  Laterality: Right;   • OTHER SURGICAL HISTORY      Surgical clips left foot   • TOE SURGERY Right     Removal of 5th toe   • TRANS METATARSAL AMPUTATION Right 2021    Procedure: AMPUTATION TRANS METATARSAL;  Surgeon: Ash Leyva DPM;  Location: [facility] OR;  Service: Podiatry;  Laterality: Right;   • WRIST SURGERY Left     repair of injury     Social History     Socioeconomic History   • Marital status: Single   Tobacco Use   • Smoking status: Former Smoker     Packs/day: 0.00     Years: 1.00     Pack years: 0.00     Quit date: 1991     Years since quittin.4   • Smokeless tobacco: Never Used   • Tobacco comment: quit at age 30   Vaping Use   • Vaping Use: Never used   Substance and Sexual Activity   • Alcohol use: Yes     Comment: Rare   • Drug use: Yes     Types: Marijuana     Comment: Daily   • Sexual activity: Defer     Family History   Problem Relation Age of Onset   • Heart disease Mother    • Heart disease Father    • Cancer Father         Unspecified   • Heart disease Brother          Objective     Vitals:    22 1519    BP: 128/68   BP Location: Left arm   Patient Position: Sitting   Cuff Size: Large Adult   Resp: 18   Temp: 97.5 °F (36.4 °C)   TempSrc: Temporal   PainSc:   2   PainLoc: Foot     There is no height or weight on file to calculate BMI.    STEADI Fall Risk Assessment has not been completed.     Review of Systems     ROS:  No fevers, chills, sweats, or body aches.     I have reviewed the HPI and ROS as documented by MA/RN. Shea Daniels MD    Physical Exam     NAD, obese  Normocephalic, atraumatic  EOMI, no scleral icterus  No respiratory distress, no cough  AAOx3, pleasant, cooperative  LLE: Plantar left heel wound continues to improve and gradually heal in.  Significantly decreased callus noted today.  RLE: Excellent granulation tissue in base of amputation site wound.  There is almost no slough noted at today's visit.  Almost no slough noted at today's visit.  Unfortunately the plantar aspect of the wound is not being packed properly and the is appears to be epithelializing over the rolled aspect of the wound without the underlying soft tissue filling in.  If it continues to heal like this the patient will have a persistent defect.  Nursing home notified.  See photos for details.       See photos below for details.      RLE:            LLE:            Result Review :  The following data was reviewed by: Shea Daniels MD on 01/26/2022:    Prior notes and photos.      Wound debridement  Performed by: Shea Daniels MD  Authorized by: Shea Daniels MD     Correct patient: Identification verified by two methods:     Verbally and Date of birth  Correct procedure/consent    Correct site/side    Correct equipment as applicable    How many wounds are you performing a debridement on?:  1  Wound 1:     Nursing Documentation:     Wound Location:  Left plantar foot  Measurements:     The total amount debrided on this wound was under 20 cm2.     Provider Documentation    Debridement type:  Sharp/excisional    Betadine       Other:  Sterile 10 blade scalpel     None    Tissue debrided:  Small    Level removed:  Subcutaneous    Patient tolerance:  Good    Hemostasis achieved by:  Silver nitrate              Assessment and Plan   Diagnoses and all orders for this visit:    1. Amputation of right midfoot (HCC) (Primary)    2. Postoperative wound dehiscence, initial encounter    3. 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 Diabetic polyneuropathy associated with type 2 diabetes mellitus (HCC)    5. Anticoagulated    Other orders  -     Wound debridement      Continue wound VAC to right foot amputation site.      Nursing home is not applying the VAC appropriately.    Foam MUST be trimmed to go into the deep aspects of the wound along the plantar (bottom/walking) surface.  This is still not being done and as such the crevice in the plantar surface of the wound is not filling in and will be difficult to get to heal if they do not start to properly apply the VAC.  I spoke directly with the nursing manager Pauline to let her know what we want done.     Do NOT allow VAC foam to come into contact with healthy skin!  The surrounding skin should be draped prior to cutting the foam and placing the VAC.  The foam MUST be cut to fit all surfaces of the open wound and NOT extend outward onto the skin.     Padmini into the deepest aspect of the left plantar wound and fill the remaining tissue defect with gauze.  This also has not been being done, the patient states they are just putting Silvadene into his wound.     Offload is much as possible.     Tight glucose control and strict diabetic diet.      Recheck 2 weeks.     Patient was given instructions and counseling regarding their condition or for health maintenance advice, as well as the wound care plan and recommendations. They understand and agree with the plan.  They will follow back up here in the clinic but are instructed to contact  us in the interim should they have any new, returning, or worsening symptoms or concerns. Please see specific information pulled into the AVS if appropriate.       Follow Up   Return in about 2 weeks (around 2/9/2022) for Recheck.      Shea Daniels MD

## 2022-01-31 ENCOUNTER — TELEPHONE (OUTPATIENT)
Dept: WOUND CARE | Facility: HOSPITAL | Age: 62
End: 2022-01-31

## 2022-02-09 ENCOUNTER — OFFICE VISIT (OUTPATIENT)
Dept: WOUND CARE | Facility: HOSPITAL | Age: 62
End: 2022-02-09

## 2022-02-09 VITALS
TEMPERATURE: 95.7 F | DIASTOLIC BLOOD PRESSURE: 66 MMHG | HEART RATE: 97 BPM | RESPIRATION RATE: 18 BRPM | SYSTOLIC BLOOD PRESSURE: 108 MMHG

## 2022-02-09 DIAGNOSIS — E11.621 DIABETIC ULCER OF LEFT HEEL ASSOCIATED WITH TYPE 2 DIABETES MELLITUS, WITH NECROSIS OF BONE: ICD-10-CM

## 2022-02-09 DIAGNOSIS — S98.311A: Primary | ICD-10-CM

## 2022-02-09 DIAGNOSIS — E11.42 DIABETIC POLYNEUROPATHY ASSOCIATED WITH TYPE 2 DIABETES MELLITUS: ICD-10-CM

## 2022-02-09 DIAGNOSIS — L97.424 DIABETIC ULCER OF LEFT HEEL ASSOCIATED WITH TYPE 2 DIABETES MELLITUS, WITH NECROSIS OF BONE: ICD-10-CM

## 2022-02-09 DIAGNOSIS — T81.31XD POSTOPERATIVE WOUND DEHISCENCE, SUBSEQUENT ENCOUNTER: ICD-10-CM

## 2022-02-09 PROCEDURE — 11042 DBRDMT SUBQ TIS 1ST 20SQCM/<: CPT | Performed by: EMERGENCY MEDICINE

## 2022-02-09 PROCEDURE — 99213 OFFICE O/P EST LOW 20 MIN: CPT | Performed by: EMERGENCY MEDICINE

## 2022-02-09 NOTE — PROGRESS NOTES
Chief Complaint  Wound Check (BILATERAL FOOT ULCERS; DM, )    Subjective        History of Present Illness    Patient is a 60-year-old type II diabetic with neuropathy and insulin dependence.  He has been followed in the wound care clinic for nonhealing ulcers on his left heel and right distal midfoot. MRI showed osteomyelitis of the left heel. He had a PICC line placed 08/20/2021 and is undergoing IV antibiotics daily (ertapenem).  He recently started to undergo hyperbaric oxygen therapy for his osteomyelitis and is tolerating it well.  He is trying to control his sugars better and really watch what he eats.  He says he has a hard time offloading the wounds because he still has to do his ADLs and go to the laundromat etc.    He says he has been continuing to try to manage his dietary intake and eating more plant-based foods and cutting out fried foods and carbs as much as possible to improve his glucose control.  He has recently seen Kami Garcia for his diabetes and says he has already lost quite a bit of weight on his new medication and diet plan, but would like to lose another 50 pounds to start to really feel better he says.    He completed his full course of IV antibiotics and was undergoing HBO therapy.  Unfortunately he developed a severe infection of the right distal foot with worsening osteomyelitis and abscesses that resulted in a right midfoot amputation.  He originally got a TMA but then required a revision and got a more proximal amputation at the MTT joints.  There was not enough healthy tissue for coverage so he has a wound VAC and is currently at a nursing home.  It appears that they are still not applying the wound VAC correctly.  Silvadene is being put in the left plantar foot wound although I had requested Padmini to the base of the wound.    He is not currently on any antibiotics.    Allergies:  Adhesive tape      Current Outpatient Medications:   •  alfuzosin (UROXATRAL) 10 MG 24 hr  tablet, Take 10 mg by mouth Daily., Disp: , Rfl:   •  apixaban (Eliquis) 5 MG tablet tablet, Take 5 mg by mouth 2 (two) times a day., Disp: , Rfl:   •  buPROPion XL (WELLBUTRIN XL) 300 MG 24 hr tablet, Take 300 mg by mouth Every Morning., Disp: , Rfl:   •  citalopram (CeleXA) 10 MG tablet, Take 10 mg by mouth Every Night., Disp: , Rfl:   •  glucose blood test strip, Test blood sugar 3 times a day, Disp: 100 each, Rfl: 5  •  HYDROcodone-acetaminophen (Norco) 5-325 MG per tablet, Take 1 tablet by mouth Every 4 (Four) Hours As Needed for Moderate Pain  for up to 18 doses., Disp: 18 tablet, Rfl: 0  •  insulin aspart (NovoLOG) 100 UNIT/ML injection, Inject 20 Units under the skin into the appropriate area as directed 3 (Three) Times a Day Before Meals. Take up to 20 units as instructed, Disp: 20 mL, Rfl: 3  •  insulin detemir (LEVEMIR) 100 UNIT/ML injection, Inject 50 Units under the skin into the appropriate area as directed Every Night. Take 40 units and adjust to 50 as instructed, Disp: 20 mL, Rfl: 3  •  lisinopril (PRINIVIL,ZESTRIL) 20 MG tablet, Take 20 mg by mouth Daily., Disp: , Rfl:   •  metFORMIN (GLUCOPHAGE) 1000 MG tablet, Take 1,000 mg by mouth 2 (two) times a day., Disp: , Rfl:   •  metoprolol succinate XL (TOPROL-XL) 50 MG 24 hr tablet, Take 50 mg by mouth Daily., Disp: , Rfl:   •  pregabalin (LYRICA) 25 MG capsule, Take 1 capsule by mouth Every 12 (Twelve) Hours for 15 days., Disp: 30 capsule, Rfl: 0  •  sertraline (ZOLOFT) 100 MG tablet, Take 100 mg by mouth Daily. (Patient not taking: Reported on 12/2/2021), Disp: , Rfl:   •  simvastatin (Zocor) 20 MG tablet, Take 20 mg by mouth Every Night., Disp: , Rfl:     Past Medical History:   Diagnosis Date   • Absence of toe of right foot    • Acute osteomyelitis of left calcaneus  8/18/2021   • Anxiety and depression    • Arthritis    • Claustrophobia    • Corns and callus    • Diabetic ulcer of left heel associated with type 2 DM 8/18/2021   • Diabetic ulcer of  left heel associated with type 2 DM 2021   • Diabetic ulcer of right midfoot associated with type 2 DM 2021   • Difficulty walking    • Essential hypertension 2021   • Hammertoe    • Hyperlipidemia LDL goal <100 2021   • Ingrown toenail    • Obesity    • Paroxysmal atrial fibrillation 2021   • Polyneuropathy    • Pressure ulcer, stage 1    • Tinea unguium    • Type 2 diabetes mellitus with polyneuropathy      Past Surgical History:   Procedure Laterality Date   • CYST REMOVAL      center of back; benign   • INCISION AND DRAINAGE ABSCESS      back   • INCISION AND DRAINAGE LEG Right 12/10/2021    Procedure: INCISION AND DRAINAGE LOWER EXTREMITY;  Surgeon: Ash Leyva DPM;  Location: [facility] MA[facility] OR;  Service: Podiatry;  Laterality: Right;   • OTHER SURGICAL HISTORY      Surgical clips left foot   • TOE SURGERY Right     Removal of 5th toe   • TRANS METATARSAL AMPUTATION Right 2021    Procedure: AMPUTATION TRANS METATARSAL;  Surgeon: Ash Lyeva DPM;  Location: [facility] OR;  Service: Podiatry;  Laterality: Right;   • WRIST SURGERY Left     repair of injury     Social History     Socioeconomic History   • Marital status: Single   Tobacco Use   • Smoking status: Former Smoker     Packs/day: 0.00     Years: 1.00     Pack years: 0.00     Quit date: 1991     Years since quittin.4   • Smokeless tobacco: Never Used   • Tobacco comment: quit at age 30   Vaping Use   • Vaping Use: Never used   Substance and Sexual Activity   • Alcohol use: Yes     Comment: Rare   • Drug use: Yes     Types: Marijuana     Comment: Daily   • Sexual activity: Defer     Family History   Problem Relation Age of Onset   • Heart disease Mother    • Heart disease Father    • Cancer Father         Unspecified   • Heart disease Brother          Objective     Vitals:    22 1202   BP: 108/66   BP Location: Left arm   Patient Position: Sitting   Pulse: 97   Resp: 18   Temp: 95.7 °F (35.4  °C)   TempSrc: Temporal   PainSc: 4  Comment: PHANTOM PAIN   PainLoc: Foot     There is no height or weight on file to calculate BMI.    STEADI Fall Risk Assessment has not been completed.     Review of Systems     ROS:  No fevers, chills, sweats, or body aches.     I have reviewed the HPI and ROS as documented by MA/RN. Shea Daniels MD    Physical Exam     NAD, obese  Normocephalic, atraumatic  EOMI, no scleral icterus  No respiratory distress, no cough  AAOx3, pleasant, cooperative  LLE: Plantar left heel wound continues to improve and gradually heal in.  Decreased callus noted again today.  RLE: Excellent granulation tissue in base of amputation site wound.  There is no slough noted at today's visit.  Unfortunately, the way the skin was allowed to roll under along the plantar aspect of the wound means that the patient will have a chronic defect in this area as it is epithelializing in along the inner margin.  If this is not improved at his next visit we may need to perform a debridement of this area in the hopes that we can get the tissue to heal together.  Improved bony coverage with granulation tissue.  See photos for details.       See photos below for details.      RLE:              LLE:                Result Review :  The following data was reviewed by: Shea Daniels MD on 02/09/2022:    Prior notes and photos.      Wound debridement  Performed by: Shea Daniels MD  Authorized by: Shea Daniels MD     Correct patient: Identification verified by two methods:     Verbally and Date of birth  Correct procedure/consent    Correct site/side    Correct equipment as applicable    How many wounds are you performing a debridement on?:  1  Wound 1:     Nursing Documentation:     Wound Location:  Left plantar foot  Measurements:     The total amount debrided on this wound was under 20 cm2.     Provider Documentation    Debridement type:  Sharp/excisional    Betadine      Other:  Sterile 10 blade scalpel      None    Tissue debrided:  Small    Level removed:  Subcutaneous    Patient tolerance:  Good    Hemostasis achieved by:  Silver nitrate    Wound Bed Post Debridement: See Photo                    Assessment and Plan   Diagnoses and all orders for this visit:    1. Amputation of right midfoot (HCC) (Primary)    2. Postoperative wound dehiscence, subsequent encounter    3. 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 Diabetic polyneuropathy associated with type 2 diabetes mellitus (HCC)    Other orders  -     Wound debridement      Discontinue wound VAC to right foot amputation site.    Start saline wet-to-dry dressing changes twice daily.    Padmini packed into the deepest aspect of plantar right foot wound.    Okay to continue Silvadene to left plantar foot wound.    Offload as much as possible.     Tight glucose control and strict diabetic diet.      Recheck 2 weeks.     Patient was given instructions and counseling regarding their condition or for health maintenance advice, as well as the wound care plan and recommendations. They understand and agree with the plan.  They will follow back up here in the clinic but are instructed to contact us in the interim should they have any new, returning, or worsening symptoms or concerns. Please see specific information pulled into the AVS if appropriate.       Follow Up   Return in about 2 weeks (around 2/23/2022) for Recheck.      Shea Daniels MD

## 2022-02-09 NOTE — SIGNIFICANT NOTE
Epithelial %: 1-25%    Exposed Bone: no    Exposed Tendon: no    Impression: IMPROVING      Short Term Goals: INCREASE GRANULATION, DECREASE SIZE

## 2022-02-11 ENCOUNTER — TELEPHONE (OUTPATIENT)
Dept: WOUND CARE | Facility: HOSPITAL | Age: 62
End: 2022-02-11

## 2022-02-11 NOTE — TELEPHONE ENCOUNTER
Patient called and was wanting to discuss getting a shoe that will make it easier for him to drive.

## 2022-02-23 ENCOUNTER — OFFICE VISIT (OUTPATIENT)
Dept: WOUND CARE | Facility: HOSPITAL | Age: 62
End: 2022-02-23

## 2022-02-23 VITALS
SYSTOLIC BLOOD PRESSURE: 118 MMHG | OXYGEN SATURATION: 96 % | TEMPERATURE: 96 F | DIASTOLIC BLOOD PRESSURE: 62 MMHG | HEART RATE: 94 BPM | RESPIRATION RATE: 18 BRPM

## 2022-02-23 DIAGNOSIS — T81.31XD POSTOPERATIVE WOUND DEHISCENCE, SUBSEQUENT ENCOUNTER: ICD-10-CM

## 2022-02-23 DIAGNOSIS — L03.115 CELLULITIS OF RIGHT FOOT: ICD-10-CM

## 2022-02-23 DIAGNOSIS — S98.311A: Primary | ICD-10-CM

## 2022-02-23 DIAGNOSIS — E11.42 DIABETIC POLYNEUROPATHY ASSOCIATED WITH TYPE 2 DIABETES MELLITUS: ICD-10-CM

## 2022-02-23 DIAGNOSIS — L97.424 DIABETIC ULCER OF LEFT HEEL ASSOCIATED WITH TYPE 2 DIABETES MELLITUS, WITH NECROSIS OF BONE: ICD-10-CM

## 2022-02-23 DIAGNOSIS — E11.621 DIABETIC ULCER OF LEFT HEEL ASSOCIATED WITH TYPE 2 DIABETES MELLITUS, WITH NECROSIS OF BONE: ICD-10-CM

## 2022-02-23 PROCEDURE — 99214 OFFICE O/P EST MOD 30 MIN: CPT | Performed by: EMERGENCY MEDICINE

## 2022-02-23 PROCEDURE — G0463 HOSPITAL OUTPT CLINIC VISIT: HCPCS | Performed by: EMERGENCY MEDICINE

## 2022-02-23 RX ORDER — SODIUM HYPOCHLORITE 1.25 MG/ML
10 SOLUTION TOPICAL 2 TIMES DAILY
Qty: 1000 ML | Refills: 1 | Status: SHIPPED | OUTPATIENT
Start: 2022-02-23 | End: 2023-01-27

## 2022-02-23 RX ORDER — DOXYCYCLINE HYCLATE 100 MG/1
100 CAPSULE ORAL 2 TIMES DAILY
Qty: 28 CAPSULE | Refills: 0 | Status: SHIPPED | OUTPATIENT
Start: 2022-02-23 | End: 2022-03-09

## 2022-02-23 NOTE — PROGRESS NOTES
Chief Complaint  Wound Check (BILATERAL FOOT ULCERS; DM, . PLANS TO DC FROM [facility] 2/25/22)    Subjective        Wound Check        Patient is a 60-year-old type II diabetic with neuropathy and insulin dependence.  He has been followed in the wound care clinic for nonhealing ulcers on his left heel and right distal midfoot. MRI showed osteomyelitis of the left heel.     He completed his full course of IV antibiotics and was undergoing HBO therapy.  Unfortunately he developed a severe infection of the right distal foot with worsening osteomyelitis and abscesses that resulted in a right midfoot amputation.  He originally got a TMA but then required a revision and got a more proximal amputation at the MTT joints.  There was not enough healthy tissue for coverage so he has a wound VAC and is currently at a nursing home.  The wound VAC was discontinued once the wound was filled in and exhibiting healthy granulation tissue.      He was supposed to be getting wet-to-dry dressing changes twice daily but unfortunately the nursing home consistently ignores our recommendations and their physician continues to change the patient back to Silvadene to the wounds on bilateral feet.      He is not currently on any antibiotics.    He is scheduled to be discharged from the nursing home in 2 days.  Unfortunately he will be living in a hotel for about a month as his apartment was given away while he was in the hospital.  He is applying for other apartments to try and find a permanent living solution.  He will have home health assistance upon discharge from the nursing home/rehab facility.    Allergies:  Adhesive tape      Current Outpatient Medications:   •  alfuzosin (UROXATRAL) 10 MG 24 hr tablet, Take 10 mg by mouth Daily., Disp: , Rfl:   •  apixaban (Eliquis) 5 MG tablet tablet, Take 5 mg by mouth 2 (two) times a day., Disp: , Rfl:   •  buPROPion XL (WELLBUTRIN XL) 300 MG 24 hr tablet, Take 300 mg by mouth Every Morning.,  Disp: , Rfl:   •  citalopram (CeleXA) 10 MG tablet, Take 10 mg by mouth Every Night., Disp: , Rfl:   •  doxycycline (VIBRAMYCIN) 100 MG capsule, Take 1 capsule by mouth 2 (Two) Times a Day for 14 days., Disp: 28 capsule, Rfl: 0  •  glucose blood test strip, Test blood sugar 3 times a day, Disp: 100 each, Rfl: 5  •  HYDROcodone-acetaminophen (Norco) 5-325 MG per tablet, Take 1 tablet by mouth Every 4 (Four) Hours As Needed for Moderate Pain  for up to 18 doses., Disp: 18 tablet, Rfl: 0  •  insulin aspart (NovoLOG) 100 UNIT/ML injection, Inject 20 Units under the skin into the appropriate area as directed 3 (Three) Times a Day Before Meals. Take up to 20 units as instructed, Disp: 20 mL, Rfl: 3  •  insulin detemir (LEVEMIR) 100 UNIT/ML injection, Inject 50 Units under the skin into the appropriate area as directed Every Night. Take 40 units and adjust to 50 as instructed, Disp: 20 mL, Rfl: 3  •  lisinopril (PRINIVIL,ZESTRIL) 20 MG tablet, Take 20 mg by mouth Daily., Disp: , Rfl:   •  metFORMIN (GLUCOPHAGE) 1000 MG tablet, Take 1,000 mg by mouth 2 (two) times a day., Disp: , Rfl:   •  metoprolol succinate XL (TOPROL-XL) 50 MG 24 hr tablet, Take 50 mg by mouth Daily., Disp: , Rfl:   •  pregabalin (LYRICA) 25 MG capsule, Take 1 capsule by mouth Every 12 (Twelve) Hours for 15 days., Disp: 30 capsule, Rfl: 0  •  sertraline (ZOLOFT) 100 MG tablet, Take 100 mg by mouth Daily. (Patient not taking: Reported on 12/2/2021), Disp: , Rfl:   •  simvastatin (Zocor) 20 MG tablet, Take 20 mg by mouth Every Night., Disp: , Rfl:   •  sodium hypochlorite (DAKIN'S 1/4 STRENGTH) 0.125 % solution topical solution 0.125%, Apply 10 mL topically to the appropriate area as directed 2 (Two) Times a Day., Disp: 1000 mL, Rfl: 1    Past Medical History:   Diagnosis Date   • Absence of toe of right foot    • Acute osteomyelitis of left calcaneus  8/18/2021   • Anxiety and depression    • Arthritis    • Claustrophobia    • Corns and callus    •  Diabetic ulcer of left heel associated with type 2 DM 2021   • Diabetic ulcer of left heel associated with type 2 DM 2021   • Diabetic ulcer of right midfoot associated with type 2 DM 2021   • Difficulty walking    • Essential hypertension 2021   • Hammertoe    • Hyperlipidemia LDL goal <100 2021   • Ingrown toenail    • Obesity    • Paroxysmal atrial fibrillation 2021   • Polyneuropathy    • Pressure ulcer, stage 1    • Tinea unguium    • Type 2 diabetes mellitus with polyneuropathy      Past Surgical History:   Procedure Laterality Date   • CYST REMOVAL      center of back; benign   • INCISION AND DRAINAGE ABSCESS      back   • INCISION AND DRAINAGE LEG Right 12/10/2021    Procedure: INCISION AND DRAINAGE LOWER EXTREMITY;  Surgeon: Ash Leyva DPM;  Location: [facility];  Service: Podiatry;  Laterality: Right;   • OTHER SURGICAL HISTORY      Surgical clips left foot   • TOE SURGERY Right     Removal of 5th toe   • TRANS METATARSAL AMPUTATION Right 2021    Procedure: AMPUTATION TRANS METATARSAL;  Surgeon: Ash Leyva DPM;  Location: [facility];  Service: Podiatry;  Laterality: Right;   • WRIST SURGERY Left     repair of injury     Social History     Socioeconomic History   • Marital status: Single   Tobacco Use   • Smoking status: Former Smoker     Packs/day: 0.00     Years: 1.00     Pack years: 0.00     Quit date: 1991     Years since quittin.5   • Smokeless tobacco: Never Used   • Tobacco comment: quit at age 30   Vaping Use   • Vaping Use: Never used   Substance and Sexual Activity   • Alcohol use: Yes     Comment: Rare   • Drug use: Yes     Types: Marijuana     Comment: Daily   • Sexual activity: Defer     Family History   Problem Relation Age of Onset   • Heart disease Mother    • Heart disease Father    • Cancer Father         Unspecified   • Heart disease Brother          Objective     Vitals:    22 1031   BP: 118/62   BP  Location: Left arm   Patient Position: Sitting   Pulse: 94   Resp: 18   Temp: 96 °F (35.6 °C)   TempSrc: Temporal   SpO2: 96%   PainSc: 0-No pain   PainLoc: Foot     There is no height or weight on file to calculate BMI.    STEADI Fall Risk Assessment has not been completed.     Review of Systems     ROS:  No fevers, chills, sweats, or body aches.     I have reviewed the HPI and ROS as documented by MA/RN. Shea Daniels MD    Physical Exam     NAD, obese  Normocephalic, atraumatic  EOMI, no scleral icterus  No respiratory distress, no cough  AAOx3, pleasant, cooperative  LLE: Plantar left heel wound continues to improve and gradually heal in.  Decreased callus noted again today and no obvious open wound in the base.  RLE: Wound continues to get smaller but the appearance of the granulation tissue is slightly less healthy today.  There is paler appearance in several areas as well as a film overlying the majority of it.  Moderate maceration along the inferior aspect of the wound.  Increased swelling and erythema along the plantar surface of the distal foot.  Moderate TTP in this area.  No fluctuance or induration.  Continued improvement of the bony coverage with soft tissue.      See photos for details.    RLE:              LLE:            Result Review :  The following data was reviewed by: Shea Daniels MD on 02/23/2022:    Prior notes and photos.      Assessment and Plan   Diagnoses and all orders for this visit:    1. Amputation of right midfoot (HCC) (Primary)    2. Postoperative wound dehiscence, subsequent encounter  -     sodium hypochlorite (DAKIN'S 1/4 STRENGTH) 0.125 % solution topical solution 0.125%; Apply 10 mL topically to the appropriate area as directed 2 (Two) Times a Day.  Dispense: 1000 mL; Refill: 1    3. 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 Diabetic polyneuropathy associated with type 2 diabetes mellitus (HCC)    5. Cellulitis  of right foot  -     doxycycline (VIBRAMYCIN) 100 MG capsule; Take 1 capsule by mouth 2 (Two) Times a Day for 14 days.  Dispense: 28 capsule; Refill: 0      Dakin's wet-to-dry dressing changes twice daily to right foot.    Okay to continue Silvadene to left plantar foot wound.    Offload as much as possible.     Tight glucose control and strict diabetic diet.      Recheck 2 weeks.     Patient was given instructions and counseling regarding their condition or for health maintenance advice, as well as the wound care plan and recommendations. They understand and agree with the plan.  They will follow back up here in the clinic but are instructed to contact us in the interim should they have any new, returning, or worsening symptoms or concerns. Please see specific information pulled into the AVS if appropriate.       Follow Up   Return in about 2 weeks (around 3/9/2022) for Recheck.      Shea Daniels MD

## 2022-02-28 ENCOUNTER — TELEPHONE (OUTPATIENT)
Dept: WOUND CARE | Facility: HOSPITAL | Age: 62
End: 2022-02-28

## 2022-02-28 NOTE — TELEPHONE ENCOUNTER
Returned phone call to patient.. he states home health had come out to the [facility] (where he is residing) and determined they will not be able to accept him as a patient because there is not a caregiver with them they can educate.  Patient's apptointment moved up to this Wednesday.

## 2022-03-01 NOTE — PROGRESS NOTES
Chief Complaint  Wound Check (BILATERAL FOOT ULCERS)    Subjective        Wound Check        Patient is a 60-year-old type II diabetic with neuropathy and insulin dependence.  He has been followed in the wound care clinic for nonhealing ulcers on his left heel and right distal midfoot. MRI showed osteomyelitis of the left heel.     He completed his full course of IV antibiotics and was undergoing HBO therapy.  Unfortunately he developed a severe infection of the right distal foot with worsening osteomyelitis and abscesses that resulted in a right midfoot amputation.  He originally got a TMA but then required a revision and got a more proximal amputation at the MTT joints.  There was not enough healthy tissue for coverage so he has a wound VAC and is currently at a nursing home.  The wound VAC was discontinued once the wound was filled in and exhibiting healthy granulation tissue.      He was supposed to be getting wet-to-dry dressing changes twice daily but unfortunately the nursing home consistently ignores our recommendations and their physician continues to change the patient back to Silvadene to the wounds on bilateral feet.      He is not currently on any antibiotics.    He was discharged from the nursing home and unfortunately is currently living in a hotel. He is applying for other apartments to try and find a permanent living solution.  Fortunately, he is involved with a [employer] and the members have been extremely helpful.  Some retired nurses who attend the [employer] have been coming over every day to dress his wounds and bring him food.    Apparently he had previously been living with his brother for 6 or 7 years but his brother and sister-in-law have kicked him out and said they do not want him back.  He contacted [employer] to try and sushila the nursing home for discharging him when he did not have a place to stay other than a hotel but was told that would not be a viable lawsuit.  However, the  "patient did get them to agree to look into suing his brothers home on her insurance.  He says it is \"my brother's damn fault that I lost my foot\" because there was an area without a good light source and the patient ended up tripping and fracturing his foot and he says that is what started the whole thing.        Allergies:  Adhesive tape      Current Outpatient Medications:   •  alfuzosin (UROXATRAL) 10 MG 24 hr tablet, Take 10 mg by mouth Daily., Disp: , Rfl:   •  apixaban (Eliquis) 5 MG tablet tablet, Take 5 mg by mouth 2 (two) times a day., Disp: , Rfl:   •  buPROPion XL (WELLBUTRIN XL) 300 MG 24 hr tablet, Take 300 mg by mouth Every Morning., Disp: , Rfl:   •  citalopram (CeleXA) 10 MG tablet, Take 10 mg by mouth Every Night., Disp: , Rfl:   •  doxycycline (VIBRAMYCIN) 100 MG capsule, Take 1 capsule by mouth 2 (Two) Times a Day for 14 days., Disp: 28 capsule, Rfl: 0  •  glucose blood test strip, Test blood sugar 3 times a day, Disp: 100 each, Rfl: 5  •  HYDROcodone-acetaminophen (Norco) 5-325 MG per tablet, Take 1 tablet by mouth Every 4 (Four) Hours As Needed for Moderate Pain  for up to 18 doses., Disp: 18 tablet, Rfl: 0  •  insulin aspart (NovoLOG) 100 UNIT/ML injection, Inject 20 Units under the skin into the appropriate area as directed 3 (Three) Times a Day Before Meals. Take up to 20 units as instructed, Disp: 20 mL, Rfl: 3  •  insulin detemir (LEVEMIR) 100 UNIT/ML injection, Inject 50 Units under the skin into the appropriate area as directed Every Night. Take 40 units and adjust to 50 as instructed, Disp: 20 mL, Rfl: 3  •  lisinopril (PRINIVIL,ZESTRIL) 20 MG tablet, Take 20 mg by mouth Daily., Disp: , Rfl:   •  metFORMIN (GLUCOPHAGE) 1000 MG tablet, Take 1,000 mg by mouth 2 (two) times a day., Disp: , Rfl:   •  metoprolol succinate XL (TOPROL-XL) 50 MG 24 hr tablet, Take 50 mg by mouth Daily., Disp: , Rfl:   •  pregabalin (LYRICA) 25 MG capsule, Take 1 capsule by mouth Every 12 (Twelve) Hours for 15 " days., Disp: 30 capsule, Rfl: 0  •  sertraline (ZOLOFT) 100 MG tablet, Take 100 mg by mouth Daily. (Patient not taking: Reported on 12/2/2021), Disp: , Rfl:   •  simvastatin (Zocor) 20 MG tablet, Take 20 mg by mouth Every Night., Disp: , Rfl:   •  sodium hypochlorite (DAKIN'S 1/4 STRENGTH) 0.125 % solution topical solution 0.125%, Apply 10 mL topically to the appropriate area as directed 2 (Two) Times a Day., Disp: 1000 mL, Rfl: 1    Past Medical History:   Diagnosis Date   • Absence of toe of right foot    • Acute osteomyelitis of left calcaneus  8/18/2021   • Anxiety and depression    • Arthritis    • Claustrophobia    • Corns and callus    • Diabetic ulcer of left heel associated with type 2 DM 8/18/2021   • Diabetic ulcer of left heel associated with type 2 DM 7/6/2021   • Diabetic ulcer of right midfoot associated with type 2 DM 8/18/2021   • Difficulty walking    • Essential hypertension 8/31/2021   • Hammertoe    • Hyperlipidemia LDL goal <100 8/31/2021   • Ingrown toenail    • Obesity    • Paroxysmal atrial fibrillation 8/31/2021   • Polyneuropathy    • Pressure ulcer, stage 1    • Tinea unguium    • Type 2 diabetes mellitus with polyneuropathy      Past Surgical History:   Procedure Laterality Date   • CYST REMOVAL      center of back; benign   • INCISION AND DRAINAGE ABSCESS      back   • INCISION AND DRAINAGE LEG Right 12/10/2021    Procedure: INCISION AND DRAINAGE LOWER EXTREMITY;  Surgeon: Ash Leyva DPM;  Location: [facility] MAIN OR;  Service: Podiatry;  Laterality: Right;   • OTHER SURGICAL HISTORY      Surgical clips left foot   • TOE SURGERY Right     Removal of 5th toe   • TRANS METATARSAL AMPUTATION Right 12/2/2021    Procedure: AMPUTATION TRANS METATARSAL;  Surgeon: Ash Leyva DPM;  Location: [facility] MAIN OR;  Service: Podiatry;  Laterality: Right;   • WRIST SURGERY Left     repair of injury     Social History     Socioeconomic History   • Marital status: Single   Tobacco  "Use   • Smoking status: Former Smoker     Packs/day: 0.00     Years: 1.00     Pack years: 0.00     Quit date: 1991     Years since quittin.5   • Smokeless tobacco: Never Used   • Tobacco comment: quit at age 30   Vaping Use   • Vaping Use: Never used   Substance and Sexual Activity   • Alcohol use: Yes     Comment: Rare   • Drug use: Yes     Types: Marijuana     Comment: Daily   • Sexual activity: Defer     Family History   Problem Relation Age of Onset   • Heart disease Mother    • Heart disease Father    • Cancer Father         Unspecified   • Heart disease Brother          Objective     Vitals:    22 1412   BP: 126/84   BP Location: Left arm   Patient Position: Sitting   Pulse: 78   Resp: 18   Temp: 96.7 °F (35.9 °C)   TempSrc: Temporal   Weight: (!) 155 kg (341 lb)   Height: 180.3 cm (71\")   PainSc: 0-No pain     Body mass index is 47.56 kg/m².    STEADI Fall Risk Assessment has not been completed.     Review of Systems     ROS:  No fevers, chills, sweats, or body aches.     I have reviewed the HPI and ROS as documented by MA/RN. Shea Daniels MD    Physical Exam     NAD, obese  Normocephalic, atraumatic  EOMI, no scleral icterus  No respiratory distress, no cough  AAOx3, pleasant, cooperative  LLE: Plantar left heel wound looks to be healed.  Decreased callus noted again today and no obvious open wound in the base.  RLE: Wound continues to get smaller but there is again noted a thin gelatinous film overlying the majority of it.  This is gently cleaned away to reveal healthy granulation tissue in the base.  No maceration today.  No erythema, fluctuance, induration, or swelling.  Padmini applied to entire wound after cleaning off the gel layer.    See photos for details.    RLE:          LLE:          Result Review :  The following data was reviewed by: Shea Daniels MD on 2022:    Prior notes and photos.      Assessment and Plan   Diagnoses and all orders for this visit:    1. " Postoperative wound dehiscence, subsequent encounter (Primary)    2. Amputation of right midfoot (HCC)    3. 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 Diabetic polyneuropathy associated with type 2 diabetes mellitus (HCC)      Patient says he owes $61 to the supply company so cannot use Padmini or PolyMem on his wound.  He still has Dakin's and gauze so once the Padmini absorbs into his wound he intends to go back to using Dakin's wet-to-dry dressing changes to the right foot.  This is being done daily by people from his Religion.    Okay to continue Silvadene to left plantar foot wound.    Offload as much as possible.     Tight glucose control and strict diabetic diet.      Recheck 3 weeks.     Patient was given instructions and counseling regarding their condition or for health maintenance advice, as well as the wound care plan and recommendations. They understand and agree with the plan.  They will follow back up here in the clinic but are instructed to contact us in the interim should they have any new, returning, or worsening symptoms or concerns. Please see specific information pulled into the AVS if appropriate.       Follow Up   Return in about 3 weeks (around 3/23/2022) for Recheck.      Shea Daniels MD

## 2022-03-02 ENCOUNTER — OFFICE VISIT (OUTPATIENT)
Dept: WOUND CARE | Facility: HOSPITAL | Age: 62
End: 2022-03-02

## 2022-03-02 VITALS
RESPIRATION RATE: 18 BRPM | WEIGHT: 315 LBS | SYSTOLIC BLOOD PRESSURE: 126 MMHG | BODY MASS INDEX: 44.1 KG/M2 | HEART RATE: 78 BPM | TEMPERATURE: 96.7 F | HEIGHT: 71 IN | DIASTOLIC BLOOD PRESSURE: 84 MMHG

## 2022-03-02 DIAGNOSIS — E11.621 DIABETIC ULCER OF LEFT HEEL ASSOCIATED WITH TYPE 2 DIABETES MELLITUS, WITH NECROSIS OF BONE: ICD-10-CM

## 2022-03-02 DIAGNOSIS — L97.424 DIABETIC ULCER OF LEFT HEEL ASSOCIATED WITH TYPE 2 DIABETES MELLITUS, WITH NECROSIS OF BONE: ICD-10-CM

## 2022-03-02 DIAGNOSIS — E11.42 DIABETIC POLYNEUROPATHY ASSOCIATED WITH TYPE 2 DIABETES MELLITUS: ICD-10-CM

## 2022-03-02 DIAGNOSIS — T81.31XD POSTOPERATIVE WOUND DEHISCENCE, SUBSEQUENT ENCOUNTER: Primary | ICD-10-CM

## 2022-03-02 DIAGNOSIS — S98.311A: ICD-10-CM

## 2022-03-02 PROCEDURE — 99213 OFFICE O/P EST LOW 20 MIN: CPT | Performed by: EMERGENCY MEDICINE

## 2022-03-02 PROCEDURE — G0463 HOSPITAL OUTPT CLINIC VISIT: HCPCS | Performed by: EMERGENCY MEDICINE

## 2022-03-24 ENCOUNTER — OFFICE VISIT (OUTPATIENT)
Dept: WOUND CARE | Facility: HOSPITAL | Age: 62
End: 2022-03-24

## 2022-03-24 VITALS
TEMPERATURE: 96.8 F | BODY MASS INDEX: 44.1 KG/M2 | DIASTOLIC BLOOD PRESSURE: 82 MMHG | RESPIRATION RATE: 18 BRPM | SYSTOLIC BLOOD PRESSURE: 128 MMHG | HEIGHT: 71 IN | HEART RATE: 86 BPM | WEIGHT: 315 LBS

## 2022-03-24 DIAGNOSIS — S98.311A: ICD-10-CM

## 2022-03-24 DIAGNOSIS — I10 ESSENTIAL HYPERTENSION: ICD-10-CM

## 2022-03-24 DIAGNOSIS — E11.42 DIABETIC POLYNEUROPATHY ASSOCIATED WITH TYPE 2 DIABETES MELLITUS: ICD-10-CM

## 2022-03-24 DIAGNOSIS — L97.424 DIABETIC ULCER OF LEFT HEEL ASSOCIATED WITH TYPE 2 DIABETES MELLITUS, WITH NECROSIS OF BONE: ICD-10-CM

## 2022-03-24 DIAGNOSIS — Z60.9 HIGH RISK SOCIAL SITUATION: ICD-10-CM

## 2022-03-24 DIAGNOSIS — T81.31XD POSTOPERATIVE WOUND DEHISCENCE, SUBSEQUENT ENCOUNTER: Primary | ICD-10-CM

## 2022-03-24 DIAGNOSIS — E11.621 DIABETIC ULCER OF LEFT HEEL ASSOCIATED WITH TYPE 2 DIABETES MELLITUS, WITH NECROSIS OF BONE: ICD-10-CM

## 2022-03-24 DIAGNOSIS — E66.01 CLASS 3 SEVERE OBESITY WITHOUT SERIOUS COMORBIDITY WITH BODY MASS INDEX (BMI) OF 45.0 TO 49.9 IN ADULT, UNSPECIFIED OBESITY TYPE: ICD-10-CM

## 2022-03-24 PROCEDURE — 97597 DBRDMT OPN WND 1ST 20 CM/<: CPT | Performed by: EMERGENCY MEDICINE

## 2022-03-24 PROCEDURE — 99213 OFFICE O/P EST LOW 20 MIN: CPT | Performed by: EMERGENCY MEDICINE

## 2022-03-24 PROCEDURE — 11042 DBRDMT SUBQ TIS 1ST 20SQCM/<: CPT | Performed by: EMERGENCY MEDICINE

## 2022-03-24 SDOH — SOCIAL STABILITY - SOCIAL INSECURITY: PROBLEM RELATED TO SOCIAL ENVIRONMENT, UNSPECIFIED: Z60.9

## 2022-03-24 NOTE — PROGRESS NOTES
Chief Complaint  Wound Check (BILATERAL FOOT WOUNDS)    Subjective        Wound Check        Patient is a 60-year-old type II diabetic with neuropathy and insulin dependence.  He has been followed in the wound care clinic for nonhealing ulcers on his left heel and right distal midfoot. MRI showed osteomyelitis of the left heel.     He completed his full course of IV antibiotics and was undergoing HBO therapy.  Unfortunately he developed a severe infection of the right distal foot with worsening osteomyelitis and abscesses that resulted in a right midfoot amputation.  He originally got a TMA but then required a revision and got a more proximal amputation at the MTT joints.  There was not enough healthy tissue for coverage so he has a wound VAC and is currently at a nursing home.  The wound VAC was discontinued once the wound was filled in and exhibiting healthy granulation tissue.      He is not currently on any antibiotics.    He was discharged from the nursing home and unfortunately is currently living in a hotel. He is applying for other apartments to try and find a permanent living solution.  Fortunately, he is involved with a [employer] and the members have been extremely helpful.  Some retired nurses who attend the [employer] have been coming over morning and evening every day to dress his wounds and bring him food.    Apparently he had previously been living with his brother for 6 or 7 years but his brother and sister-in-law have kicked him out and said they do not want him back.  He is suing them and alleging that his amputation is their fault.    He has a very small amount of money coming in each month and has not been able to find an apartment that he can afford thus far.  He has a very difficult situation currently.    They have not been doing anything to the left plantar wound and have been doing Dakin's wet to dry twice a day on the right postoperative wound.    Allergies:  Adhesive tape      Current Outpatient  Medications:   •  alfuzosin (UROXATRAL) 10 MG 24 hr tablet, Take 10 mg by mouth Daily., Disp: , Rfl:   •  apixaban (Eliquis) 5 MG tablet tablet, Take 5 mg by mouth 2 (two) times a day., Disp: , Rfl:   •  buPROPion XL (WELLBUTRIN XL) 300 MG 24 hr tablet, Take 300 mg by mouth Every Morning., Disp: , Rfl:   •  citalopram (CeleXA) 10 MG tablet, Take 10 mg by mouth Every Night., Disp: , Rfl:   •  glucose blood test strip, Test blood sugar 3 times a day, Disp: 100 each, Rfl: 5  •  HYDROcodone-acetaminophen (Norco) 5-325 MG per tablet, Take 1 tablet by mouth Every 4 (Four) Hours As Needed for Moderate Pain  for up to 18 doses., Disp: 18 tablet, Rfl: 0  •  insulin aspart (NovoLOG) 100 UNIT/ML injection, Inject 20 Units under the skin into the appropriate area as directed 3 (Three) Times a Day Before Meals. Take up to 20 units as instructed, Disp: 20 mL, Rfl: 3  •  insulin detemir (LEVEMIR) 100 UNIT/ML injection, Inject 50 Units under the skin into the appropriate area as directed Every Night. Take 40 units and adjust to 50 as instructed, Disp: 20 mL, Rfl: 3  •  lisinopril (PRINIVIL,ZESTRIL) 20 MG tablet, Take 20 mg by mouth Daily., Disp: , Rfl:   •  metFORMIN (GLUCOPHAGE) 1000 MG tablet, Take 1,000 mg by mouth 2 (two) times a day., Disp: , Rfl:   •  metoprolol succinate XL (TOPROL-XL) 50 MG 24 hr tablet, Take 50 mg by mouth Daily., Disp: , Rfl:   •  pregabalin (LYRICA) 25 MG capsule, Take 1 capsule by mouth Every 12 (Twelve) Hours for 15 days., Disp: 30 capsule, Rfl: 0  •  sertraline (ZOLOFT) 100 MG tablet, Take 100 mg by mouth Daily. (Patient not taking: Reported on 12/2/2021), Disp: , Rfl:   •  simvastatin (Zocor) 20 MG tablet, Take 20 mg by mouth Every Night., Disp: , Rfl:   •  sodium hypochlorite (DAKIN'S 1/4 STRENGTH) 0.125 % solution topical solution 0.125%, Apply 10 mL topically to the appropriate area as directed 2 (Two) Times a Day., Disp: 1000 mL, Rfl: 1    Past Medical History:   Diagnosis Date   • Absence of toe  of right foot    • Acute osteomyelitis of left calcaneus  2021   • Anxiety and depression    • Arthritis    • Claustrophobia    • Corns and callus    • Diabetic ulcer of left heel associated with type 2 DM 2021   • Diabetic ulcer of left heel associated with type 2 DM 2021   • Diabetic ulcer of right midfoot associated with type 2 DM 2021   • Difficulty walking    • Essential hypertension 2021   • Hammertoe    • Hyperlipidemia LDL goal <100 2021   • Ingrown toenail    • Obesity    • Paroxysmal atrial fibrillation 2021   • Polyneuropathy    • Pressure ulcer, stage 1    • Tinea unguium    • Type 2 diabetes mellitus with polyneuropathy      Past Surgical History:   Procedure Laterality Date   • CYST REMOVAL      center of back; benign   • INCISION AND DRAINAGE ABSCESS      back   • INCISION AND DRAINAGE LEG Right 12/10/2021    Procedure: INCISION AND DRAINAGE LOWER EXTREMITY;  Surgeon: Ash Leyva DPM;  Location: [facility] OR;  Service: Podiatry;  Laterality: Right;   • OTHER SURGICAL HISTORY      Surgical clips left foot   • TOE SURGERY Right     Removal of 5th toe   • TRANS METATARSAL AMPUTATION Right 2021    Procedure: AMPUTATION TRANS METATARSAL;  Surgeon: Ash Leyva DPM;  Location: [facility] OR;  Service: Podiatry;  Laterality: Right;   • WRIST SURGERY Left     repair of injury     Social History     Socioeconomic History   • Marital status: Single   Tobacco Use   • Smoking status: Former Smoker     Packs/day: 0.00     Years: 1.00     Pack years: 0.00     Quit date: 1991     Years since quittin.5   • Smokeless tobacco: Never Used   • Tobacco comment: quit at age 30   Vaping Use   • Vaping Use: Never used   Substance and Sexual Activity   • Alcohol use: Yes     Comment: Rare   • Drug use: Yes     Types: Marijuana     Comment: Daily   • Sexual activity: Defer     Family History   Problem Relation Age of Onset   • Heart disease Mother    •  "Heart disease Father    • Cancer Father         Unspecified   • Heart disease Brother          Objective     Vitals:    03/24/22 1432   BP: 128/82   BP Location: Left arm   Patient Position: Sitting   Pulse: 86   Resp: 18   Temp: 96.8 °F (36 °C)   TempSrc: Temporal   Weight: (!) 155 kg (341 lb)   Height: 180.3 cm (71\")   PainSc: 0-No pain     Body mass index is 47.56 kg/m².    STEADI Fall Risk Assessment has not been completed.     Review of Systems     ROS:  No fevers, chills, sweats, or body aches.     I have reviewed the HPI and ROS as documented by MA/RN. Shea Daniels MD    Physical Exam     NAD, obese  Normocephalic, atraumatic  EOMI, no scleral icterus  No respiratory distress, no cough  AAOx3, pleasant, cooperative  LLE: Plantar left heel wound looks to be healed.  Decreased callus noted again today and no obvious open wound in the base.  RLE: Wound continues to get smaller but there is again noted a thin gelatinous film overlying the majority of it.  This is gently cleaned away to reveal healthy granulation tissue in the base.  No maceration today.  No erythema, fluctuance, induration, or swelling.  Padmini applied to entire wound after cleaning off the gel layer.    See photos for details.    RLE:                      LLE:                  Result Review :  The following data was reviewed by: Shea Daniels MD on 03/24/2022:    Prior notes and photos.    Procedure   Wound debridement  Performed by: Shea Daniels MD  Authorized by: Shea Daniels MD     Correct patient: Identification verified by two methods:     Verbally and Date of birth  Correct procedure/consent    Correct site/side    Correct equipment as applicable    How many wounds are you performing a debridement on?:  2  Wound 1:     Nursing Documentation:     Wound Location:  Right TMA site  Measurements:     The total amount debrided on this wound was over 20 cm2.     Provider Documentation    Debridement type:  Sharp/excisional    " Betadine      Other:  10 blade scalpel     None    Tissue debrided:  Small    Level removed:  Subcutaneous    Patient tolerance:  Good    Hemostasis achieved by:  Direct pressure    Wound Bed Post Debridement: See Photo      Description:  10.3 x 2.3 cm = 23.69 cm² total subcutaneous debridement area  Wound 2:     Wound Location:  Left plantar foot   Measurements:    The total amount debrided on this wound was under 20 cm2.      Provider Documentation:     Debridement type:  Sharp/Excisional    Betadine      Other:  10 blade     None    Tissue debrided:  Moderate    Level removed:  Skin    Patient tolerance:  Good    Hemostasis achieved by:  Direct Pressure    Wound Bed Post Debridement: See Photo                     Assessment and Plan   Diagnoses and all orders for this visit:    1. Postoperative wound dehiscence, subsequent encounter (Primary)  -     Wound debridement  -     Ambulatory Referral to Home Health    2. Amputation of right midfoot (HCC)  -     Ambulatory Referral to Home Health    3. 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 Diabetic polyneuropathy associated with type 2 diabetes mellitus ([facility])  -     Ambulatory Referral to Home Health    5. Essential hypertension    6. Class 3 severe obesity without serious comorbidity with body mass index (BMI) of 45.0 to 49.9 in adult, unspecified obesity type (HCC)    7. High risk social situation        PolyMem silver to right TMA postop wound.  This should be done daily or every other day, depending on drainage.  If it becomes saturated it needs to be changed immediately.    Resume Silvadene to left plantar foot wound.  The residual Silvadene must be cleaned from the base of the wound with soap and water and not allowed to build up within the base.    Offload both feet as much as possible.     Tight glucose control and strict diabetic  diet.      Patient is significantly limited by social determinants of health given his living situation and complicated medical problems.  We are trying to find a home health company that will help take care of him at the hotel and be able to get him the supplies he needs and keep an eye on his wound regularly.      Recheck 3 weeks.     Patient was given instructions and counseling regarding their condition or for health maintenance advice, as well as the wound care plan and recommendations. They understand and agree with the plan.  They will follow back up here in the clinic but are instructed to contact us in the interim should they have any new, returning, or worsening symptoms or concerns. Please see specific information pulled into the AVS if appropriate.       Follow Up   Return in about 3 weeks (around 4/14/2022) for Recheck.      Shea Daniels MD

## 2022-04-01 ENCOUNTER — APPOINTMENT (OUTPATIENT)
Dept: DIABETES SERVICES | Facility: HOSPITAL | Age: 62
End: 2022-04-01

## 2022-04-11 NOTE — PROGRESS NOTES
[facility]     Progress Note    Patient Name: Jose Shaikh  : 1960  MRN: [id number]  Primary Care Physician:  Corazon Gr MD  Date of admission: 2021      Subjective   Brief summary.  Patient admitted with nonhealing ulcer and osteomyelitis of the right foot      HPI:  Follow-up on osteomyelitis and nonhealing ulcer.    Patient post amputation doing well.  Pain improving.  No fever    Review of Systems     No fever chills.  Less pain today.  Blood sugar stable      Objective     Vitals:   Temp:  [97.8 °F (36.6 °C)-98.7 °F (37.1 °C)] 98.2 °F (36.8 °C)  Heart Rate:  [65-71] 65  Resp:  [18] 18  BP: ()/(55-76) 93/65    Physical Exam :     Elderly male not in acute distress  Heart regular lungs clear  Abdomen obese  But foot in surgical dressing      Result Review:  I have personally reviewed the results from the time of this admission to 2021 19:33 EST and agree with these findings:  [x]  Laboratory  []  Microbiology  []  Radiology  []  EKG/Telemetry   []  Cardiology/Vascular   []  Pathology  []  Old records  []  Other:           Assessment / Plan       Active Hospital Problems:  Active Hospital Problems    Diagnosis    • Cellulitis and abscess of foot    • Lactic acidosis    • Essential hypertension    • Diabetic ulcer of right midfoot associated with type 2 DM    • Diabetic ulcer of left heel associated with type 2 DM        Plan:     Continues to improve slowly.  Blood sugar stable  Waiting for placement       DVT prophylaxis:  Medical DVT prophylaxis orders are present.    CODE STATUS:   Code Status (Patient has no pulse and is not breathing): CPR (Attempt to Resuscitate)  Medical Interventions (Patient has pulse or is breathing): Full Support              Electronically signed by Yung Grijalva MD, 21, 7:34 PM EST.              Thank you for visiting [facility]    Let's see what your MRI shows.    Depending on results, we might recommend ortho consult, PT, and/or injection.    OK to continue ice, analgesics, etc. To help control your symptoms.      Turn in your colon screening kit.      I do strongly recommend Covid vaccination.  The risks of getting Covid greatly exceed the risks of vaccination.  Let me know what questions and concerns you have.     Contact us or return if questions or concerns.     Have a nice day!    Dr. Pollard     Return in about 4 weeks (around 5/9/2022) for Recheck.      If you need medication refills, please contact your pharmacy 3 days before your prescriptions runs out or download the [facility] ricardo for your smart phone. If you are out of refills, your pharmacy will contact contact the clinic.                                     [facility] [phone]

## 2022-04-13 ENCOUNTER — TELEPHONE (OUTPATIENT)
Dept: WOUND CARE | Facility: HOSPITAL | Age: 62
End: 2022-04-13

## 2022-04-13 NOTE — TELEPHONE ENCOUNTER
Spoke to Anna Marie at [facility] home health on Tuesday, 4-12-22 regardin patient living in a motel and having an address using his pastors home address, so conflicting for home health as far as meeting guidelines. Patient does have an appt with woundcare on Thusday, April 14th, and will reassess how we can assure his woundcare is getting done.

## 2022-04-14 ENCOUNTER — OFFICE VISIT (OUTPATIENT)
Dept: WOUND CARE | Facility: HOSPITAL | Age: 62
End: 2022-04-14

## 2022-04-14 VITALS
TEMPERATURE: 97.7 F | HEART RATE: 98 BPM | SYSTOLIC BLOOD PRESSURE: 138 MMHG | RESPIRATION RATE: 18 BRPM | DIASTOLIC BLOOD PRESSURE: 76 MMHG

## 2022-04-14 DIAGNOSIS — L89.896 PRESSURE INJURY OF DEEP TISSUE OF RIGHT FOOT: ICD-10-CM

## 2022-04-14 DIAGNOSIS — T81.31XD POSTOPERATIVE WOUND DEHISCENCE, SUBSEQUENT ENCOUNTER: Primary | ICD-10-CM

## 2022-04-14 DIAGNOSIS — Z60.9 HIGH RISK SOCIAL SITUATION: ICD-10-CM

## 2022-04-14 DIAGNOSIS — I10 ESSENTIAL HYPERTENSION: ICD-10-CM

## 2022-04-14 DIAGNOSIS — E66.01 CLASS 3 SEVERE OBESITY WITHOUT SERIOUS COMORBIDITY WITH BODY MASS INDEX (BMI) OF 45.0 TO 49.9 IN ADULT, UNSPECIFIED OBESITY TYPE: ICD-10-CM

## 2022-04-14 DIAGNOSIS — E11.42 DIABETIC POLYNEUROPATHY ASSOCIATED WITH TYPE 2 DIABETES MELLITUS: ICD-10-CM

## 2022-04-14 DIAGNOSIS — S98.311A: ICD-10-CM

## 2022-04-14 DIAGNOSIS — L97.424 DIABETIC ULCER OF LEFT HEEL ASSOCIATED WITH TYPE 2 DIABETES MELLITUS, WITH NECROSIS OF BONE: ICD-10-CM

## 2022-04-14 DIAGNOSIS — E11.621 DIABETIC ULCER OF LEFT HEEL ASSOCIATED WITH TYPE 2 DIABETES MELLITUS, WITH NECROSIS OF BONE: ICD-10-CM

## 2022-04-14 PROCEDURE — 11042 DBRDMT SUBQ TIS 1ST 20SQCM/<: CPT | Performed by: EMERGENCY MEDICINE

## 2022-04-14 PROCEDURE — 97597 DBRDMT OPN WND 1ST 20 CM/<: CPT | Performed by: EMERGENCY MEDICINE

## 2022-04-14 PROCEDURE — 11045 DBRDMT SUBQ TISS EACH ADDL: CPT | Performed by: EMERGENCY MEDICINE

## 2022-04-14 SDOH — SOCIAL STABILITY - SOCIAL INSECURITY: PROBLEM RELATED TO SOCIAL ENVIRONMENT, UNSPECIFIED: Z60.9

## 2022-04-14 NOTE — PROGRESS NOTES
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  He has had multiple problems with wounds on bilateral feet that subsequently resulted in osteomyelitis.  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  It has been healing by secondary intention, initially with wound VAC and now with dressing changes..    There are significant social issues contributing to difficulty with his care.  He has members of his [employer] coming every other day to do dressing changes on both feet.  Silvadene is being used on the left plantar foot and PolyMem silver on the right amputation site.  He does not have any type of offloading boot or shoe and says he has to walk on them when he goes to the bathroom and things like that.  He has been walking to the bathroom without using his Rollator.    Allergies:  Adhesive tape      Current Outpatient Medications:   •  alfuzosin (UROXATRAL) 10 MG 24 hr tablet, Take 10 mg by mouth Daily., Disp: , Rfl:   •  apixaban (Eliquis) 5 MG tablet tablet, Take 5 mg by mouth 2 (two) times a day., Disp: , Rfl:   •  buPROPion XL (WELLBUTRIN XL) 300 MG 24 hr tablet, Take 300 mg by mouth Every Morning., Disp: , Rfl:   •  citalopram (CeleXA) 10 MG tablet, Take 10 mg by mouth Every Night., Disp: , Rfl:   •  glucose blood test strip, Test blood sugar 3 times a day, Disp: 100 each, Rfl: 5  •  HYDROcodone-acetaminophen (Norco) 5-325 MG per tablet, Take 1 tablet by mouth Every 4 (Four) Hours As Needed for Moderate Pain  for up to 18 doses., Disp: 18 tablet, Rfl: 0  •  insulin aspart (NovoLOG) 100 UNIT/ML injection, Inject 20 Units under the skin into the appropriate area as directed 3 (Three) Times a Day Before  Meals. Take up to 20 units as instructed, Disp: 20 mL, Rfl: 3  •  insulin detemir (LEVEMIR) 100 UNIT/ML injection, Inject 50 Units under the skin into the appropriate area as directed Every Night. Take 40 units and adjust to 50 as instructed, Disp: 20 mL, Rfl: 3  •  lisinopril (PRINIVIL,ZESTRIL) 20 MG tablet, Take 20 mg by mouth Daily., Disp: , Rfl:   •  metFORMIN (GLUCOPHAGE) 1000 MG tablet, Take 1,000 mg by mouth 2 (two) times a day., Disp: , Rfl:   •  metoprolol succinate XL (TOPROL-XL) 50 MG 24 hr tablet, Take 50 mg by mouth Daily., Disp: , Rfl:   •  pregabalin (LYRICA) 25 MG capsule, Take 1 capsule by mouth Every 12 (Twelve) Hours for 15 days., Disp: 30 capsule, Rfl: 0  •  sertraline (ZOLOFT) 100 MG tablet, Take 100 mg by mouth Daily. (Patient not taking: Reported on 12/2/2021), Disp: , Rfl:   •  simvastatin (Zocor) 20 MG tablet, Take 20 mg by mouth Every Night., Disp: , Rfl:   •  sodium hypochlorite (DAKIN'S 1/4 STRENGTH) 0.125 % solution topical solution 0.125%, Apply 10 mL topically to the appropriate area as directed 2 (Two) Times a Day., Disp: 1000 mL, Rfl: 1    Past Medical History:   Diagnosis Date   • Absence of toe of right foot    • Acute osteomyelitis of left calcaneus  8/18/2021   • Anxiety and depression    • Arthritis    • Claustrophobia    • Corns and callus    • Diabetic ulcer of left heel associated with type 2 DM 8/18/2021   • Diabetic ulcer of left heel associated with type 2 DM 7/6/2021   • Diabetic ulcer of right midfoot associated with type 2 DM 8/18/2021   • Difficulty walking    • Essential hypertension 8/31/2021   • Hammertoe    • Hyperlipidemia LDL goal <100 8/31/2021   • Ingrown toenail    • Obesity    • Paroxysmal atrial fibrillation 8/31/2021   • Polyneuropathy    • Pressure ulcer, stage 1    • Tinea unguium    • Type 2 diabetes mellitus with polyneuropathy      Past Surgical History:   Procedure Laterality Date   • CYST REMOVAL      center of back; benign   • INCISION AND DRAINAGE  ABSCESS      back   • INCISION AND DRAINAGE LEG Right 12/10/2021    Procedure: INCISION AND DRAINAGE LOWER EXTREMITY;  Surgeon: Ash Leyva DPM;  Location: [facility] MAIN OR;  Service: Podiatry;  Laterality: Right;   • OTHER SURGICAL HISTORY      Surgical clips left foot   • TOE SURGERY Right     Removal of 5th toe   • TRANS METATARSAL AMPUTATION Right 2021    Procedure: AMPUTATION TRANS METATARSAL;  Surgeon: Ash Leyva DPM;  Location: [facility] MAIN OR;  Service: Podiatry;  Laterality: Right;   • WRIST SURGERY Left     repair of injury     Social History     Socioeconomic History   • Marital status: Single   Tobacco Use   • Smoking status: Former Smoker     Packs/day: 0.00     Years: 1.00     Pack years: 0.00     Quit date: 1991     Years since quittin.6   • Smokeless tobacco: Never Used   • Tobacco comment: quit at age 30   Vaping Use   • Vaping Use: Never used   Substance and Sexual Activity   • Alcohol use: Yes     Comment: Rare   • Drug use: Yes     Types: Marijuana     Comment: Daily   • Sexual activity: Defer     Family History   Problem Relation Age of Onset   • Heart disease Mother    • Heart disease Father    • Cancer Father         Unspecified   • Heart disease Brother          Objective     Vitals:    22 1504   BP: 138/76   BP Location: Left arm   Patient Position: Sitting   Pulse: 98   Resp: 18   Temp: 97.7 °F (36.5 °C)   TempSrc: Temporal   PainSc:   2   PainLoc: Foot     There is no height or weight on file to calculate BMI.    STEADI Fall Risk Assessment has not been completed.     Review of Systems     ROS:  No fevers, chills, sweats, or body aches.     I have reviewed the HPI and ROS as documented by MA/RN. Shea Daniels MD    Physical Exam     NAD, obese  Normocephalic, atraumatic  EOMI, no scleral icterus  No respiratory distress, no cough  AAOx3, pleasant, cooperative  LLE: Plantar left heel wound covered over with thick callus.  There is undermining and  tunneling able to be probed with sterile swab but wound is not visualized prior to debridement.  RLE: Wound continues to get smaller but today there is evidence of deep tissue injury along the lateral plantar aspect of the wound and soft tissues.  There is separation along the skin and the margin of the wound with periwound maceration.  Mild periwound callus.  No erythema, fluctuance, induration, or swelling.      See photos for details.    RLE:            LLE:            Result Review :  The following data was reviewed by: Shea Daniels MD on 03/24/2022:    Prior notes and photos.    Procedure   Wound debridement  Performed by: Shea Daniels MD  Authorized by: Shea Daniels MD     Correct patient: Identification verified by two methods:     Verbally and Date of birth  Correct procedure/consent    Correct site/side    Correct equipment as applicable    How many wounds are you performing a debridement on?:  2  Wound 1:     Nursing Documentation:     Wound Location:  Right foot amputation site  Measurements:     The total amount debrided on this wound was over 20 cm2.     Provider Documentation    Betadine      Other:  Sterile pickups and 10 blade     None    Tissue debrided:  Large    Level removed:  Subcutaneous    Patient tolerance:  Good    Hemostasis achieved by:  Silver nitrate and Direct pressure    Wound Bed Post Debridement: See Photo      Description:  Total area debrided 9 x 3.5 cm = 31.5 cm²  Wound 2:     Wound Location:  Left plantar foot   Measurements:    The total amount debrided on this wound was under 20 cm2.      Provider Documentation:     Debridement type:  Sharp/Excisional    Betadine      Other:  Sterile 10 blade     None    Tissue debrided:  Moderate    Level removed:  Skin    Patient tolerance:  Good    Wound Bed Post Debridement: See Photo                             Assessment and Plan   Diagnoses and all orders for this visit:    1. Postoperative wound dehiscence, subsequent  encounter (Primary)  -     Wound debridement    2. Amputation of right midfoot (HCC)  -     Wound debridement    3. Diabetic ulcer of left heel associated with type 2 diabetes mellitus, with necrosis of bone (HCC)  -     Wound debridement    4. Diabetic polyneuropathy associated with type 2 diabetes mellitus (HCC)    5. Essential hypertension    6. Class 3 severe obesity without serious comorbidity with body mass index (BMI) of 45.0 to 49.9 in adult, unspecified obesity type (HCC)    7. High risk social situation    8. Pressure injury of deep tissue of right foot        PolyMem silver to right MTT postop wound.  This should be done daily or every other day, depending on drainage.  If it becomes saturated it needs to be changed immediately.    NO weightbearing anterior aspect of right foot!    Padmini packed into the base of the wound on the left plantar foot daily or every other day, depending on absorption.    Offload both feet as much as possible.     Tight glucose control and strict diabetic diet.      Patient is significantly limited by social determinants of health given his living situation and complicated medical problems.  We are trying to find a home health company that will help take care of him at the hotel and be able to get him the supplies he needs and keep an eye on his wound regularly.      Recheck 3 weeks.     Patient was given instructions and counseling regarding their condition or for health maintenance advice, as well as the wound care plan and recommendations. They understand and agree with the plan.  They will follow back up here in the clinic but are instructed to contact us in the interim should they have any new, returning, or worsening symptoms or concerns. Please see specific information pulled into the AVS if appropriate.       Follow Up   Return in about 3 weeks (around 5/5/2022) for Recheck.      Shea Daniels MD

## 2022-04-27 ENCOUNTER — OFFICE VISIT (OUTPATIENT)
Dept: WOUND CARE | Facility: HOSPITAL | Age: 62
End: 2022-04-27

## 2022-04-27 VITALS
SYSTOLIC BLOOD PRESSURE: 108 MMHG | RESPIRATION RATE: 18 BRPM | HEART RATE: 88 BPM | TEMPERATURE: 97.5 F | DIASTOLIC BLOOD PRESSURE: 58 MMHG

## 2022-04-27 DIAGNOSIS — E11.42 DIABETIC POLYNEUROPATHY ASSOCIATED WITH TYPE 2 DIABETES MELLITUS: ICD-10-CM

## 2022-04-27 DIAGNOSIS — L97.424 DIABETIC ULCER OF LEFT HEEL ASSOCIATED WITH TYPE 2 DIABETES MELLITUS, WITH NECROSIS OF BONE: ICD-10-CM

## 2022-04-27 DIAGNOSIS — T81.31XD POSTOPERATIVE WOUND DEHISCENCE, SUBSEQUENT ENCOUNTER: Primary | ICD-10-CM

## 2022-04-27 DIAGNOSIS — L03.115 CELLULITIS OF RIGHT FOOT: ICD-10-CM

## 2022-04-27 DIAGNOSIS — E11.621 DIABETIC ULCER OF LEFT HEEL ASSOCIATED WITH TYPE 2 DIABETES MELLITUS, WITH NECROSIS OF BONE: ICD-10-CM

## 2022-04-27 DIAGNOSIS — S98.311A: ICD-10-CM

## 2022-04-27 DIAGNOSIS — Z60.9 HIGH RISK SOCIAL SITUATION: ICD-10-CM

## 2022-04-27 PROCEDURE — 11046 DBRDMT MUSC&/FSCA EA ADDL: CPT | Performed by: EMERGENCY MEDICINE

## 2022-04-27 PROCEDURE — 87205 SMEAR GRAM STAIN: CPT | Performed by: EMERGENCY MEDICINE

## 2022-04-27 PROCEDURE — 87147 CULTURE TYPE IMMUNOLOGIC: CPT | Performed by: EMERGENCY MEDICINE

## 2022-04-27 PROCEDURE — 87186 SC STD MICRODIL/AGAR DIL: CPT | Performed by: EMERGENCY MEDICINE

## 2022-04-27 PROCEDURE — 11043 DBRDMT MUSC&/FSCA 1ST 20/<: CPT | Performed by: EMERGENCY MEDICINE

## 2022-04-27 PROCEDURE — 11042 DBRDMT SUBQ TIS 1ST 20SQCM/<: CPT | Performed by: EMERGENCY MEDICINE

## 2022-04-27 PROCEDURE — 87070 CULTURE OTHR SPECIMN AEROBIC: CPT | Performed by: EMERGENCY MEDICINE

## 2022-04-27 RX ORDER — DOXYCYCLINE HYCLATE 100 MG/1
100 CAPSULE ORAL 2 TIMES DAILY
Qty: 20 CAPSULE | Refills: 0 | Status: SHIPPED | OUTPATIENT
Start: 2022-04-27 | End: 2022-05-07

## 2022-04-27 SDOH — SOCIAL STABILITY - SOCIAL INSECURITY: PROBLEM RELATED TO SOCIAL ENVIRONMENT, UNSPECIFIED: Z60.9

## 2022-04-27 NOTE — PROGRESS NOTES
Chief Complaint  Wound Check (Wound check to BLE ( )/)    Subjective      History of Present Illness    Jose Shaikh  is a 61 y.o. diabetic male with a right MTT amputation that is healing by secondary intention.  He also has a wound on the left plantar foot.  He has a difficult social situation due to financial constraints and housing issues.  He currently has a nurse from his [employer] coming over every day to do dressing changes.  They have been doing PolyMem silver to the right foot and trying to pack Aquacel AG into the left foot but it is very narrow and has been challenging.  He uses a Rollator but has been unable to use other offloading devices.      Allergies:  Adhesive tape      Current Outpatient Medications:   •  alfuzosin (UROXATRAL) 10 MG 24 hr tablet, Take 10 mg by mouth Daily., Disp: , Rfl:   •  apixaban (Eliquis) 5 MG tablet tablet, Take 5 mg by mouth 2 (two) times a day., Disp: , Rfl:   •  buPROPion XL (WELLBUTRIN XL) 300 MG 24 hr tablet, Take 300 mg by mouth Every Morning., Disp: , Rfl:   •  citalopram (CeleXA) 10 MG tablet, Take 10 mg by mouth Every Night., Disp: , Rfl:   •  doxycycline (VIBRAMYCIN) 100 MG capsule, Take 1 capsule by mouth 2 (Two) Times a Day for 10 days., Disp: 20 capsule, Rfl: 0  •  glucose blood test strip, Test blood sugar 3 times a day, Disp: 100 each, Rfl: 5  •  HYDROcodone-acetaminophen (Norco) 5-325 MG per tablet, Take 1 tablet by mouth Every 4 (Four) Hours As Needed for Moderate Pain  for up to 18 doses., Disp: 18 tablet, Rfl: 0  •  insulin aspart (NovoLOG) 100 UNIT/ML injection, Inject 20 Units under the skin into the appropriate area as directed 3 (Three) Times a Day Before Meals. Take up to 20 units as instructed, Disp: 20 mL, Rfl: 3  •  insulin detemir (LEVEMIR) 100 UNIT/ML injection, Inject 50 Units under the skin into the appropriate area as directed Every Night. Take 40 units and adjust to 50 as instructed, Disp: 20 mL, Rfl: 3  •  lisinopril (PRINIVIL,ZESTRIL)  20 MG tablet, Take 20 mg by mouth Daily., Disp: , Rfl:   •  metFORMIN (GLUCOPHAGE) 1000 MG tablet, Take 1,000 mg by mouth 2 (two) times a day., Disp: , Rfl:   •  metoprolol succinate XL (TOPROL-XL) 50 MG 24 hr tablet, Take 50 mg by mouth Daily., Disp: , Rfl:   •  pregabalin (LYRICA) 25 MG capsule, Take 1 capsule by mouth Every 12 (Twelve) Hours for 15 days., Disp: 30 capsule, Rfl: 0  •  sertraline (ZOLOFT) 100 MG tablet, Take 100 mg by mouth Daily. (Patient not taking: Reported on 12/2/2021), Disp: , Rfl:   •  simvastatin (Zocor) 20 MG tablet, Take 20 mg by mouth Every Night., Disp: , Rfl:   •  sodium hypochlorite (DAKIN'S 1/4 STRENGTH) 0.125 % solution topical solution 0.125%, Apply 10 mL topically to the appropriate area as directed 2 (Two) Times a Day., Disp: 1000 mL, Rfl: 1    Past Medical History:   Diagnosis Date   • Absence of toe of right foot    • Acute osteomyelitis of left calcaneus  8/18/2021   • Anxiety and depression    • Arthritis    • Claustrophobia    • Corns and callus    • Diabetic ulcer of left heel associated with type 2 DM 8/18/2021   • Diabetic ulcer of left heel associated with type 2 DM 7/6/2021   • Diabetic ulcer of right midfoot associated with type 2 DM 8/18/2021   • Difficulty walking    • Essential hypertension 8/31/2021   • Hammertoe    • Hyperlipidemia LDL goal <100 8/31/2021   • Ingrown toenail    • Obesity    • Paroxysmal atrial fibrillation 8/31/2021   • Polyneuropathy    • Pressure ulcer, stage 1    • Tinea unguium    • Type 2 diabetes mellitus with polyneuropathy      Past Surgical History:   Procedure Laterality Date   • CYST REMOVAL      center of back; benign   • INCISION AND DRAINAGE ABSCESS      back   • INCISION AND DRAINAGE LEG Right 12/10/2021    Procedure: INCISION AND DRAINAGE LOWER EXTREMITY;  Surgeon: Ash Leyva DPM;  Location: [facility] MAIN OR;  Service: Podiatry;  Laterality: Right;   • OTHER SURGICAL HISTORY      Surgical clips left foot   • TOE SURGERY  Right     Removal of 5th toe   • TRANS METATARSAL AMPUTATION Right 2021    Procedure: AMPUTATION TRANS METATARSAL;  Surgeon: Ash Leyva DPM;  Location: [facility] OR;  Service: Podiatry;  Laterality: Right;   • WRIST SURGERY Left     repair of injury     Social History     Socioeconomic History   • Marital status: Single   Tobacco Use   • Smoking status: Former Smoker     Packs/day: 0.00     Years: 1.00     Pack years: 0.00     Quit date: 1991     Years since quittin.6   • Smokeless tobacco: Never Used   • Tobacco comment: quit at age 30   Vaping Use   • Vaping Use: Never used   Substance and Sexual Activity   • Alcohol use: Yes     Comment: Rare   • Drug use: Yes     Types: Marijuana     Comment: Daily   • Sexual activity: Defer           Objective     Vitals:    22 1102   BP: 108/58   BP Location: Left arm   Patient Position: Sitting   Pulse: 88   Resp: 18   Temp: 97.5 °F (36.4 °C)   TempSrc: Temporal   PainSc: 0-No pain     There is no height or weight on file to calculate BMI.    STEADI Fall Risk Assessment has not been completed.     Review of Systems     ROS:  No fevers, chills, sweats, or body aches.     I have reviewed the HPI and ROS as documented by MA/RN. Shea Daniels MD    Physical Exam     NAD  Normocephalic, atraumatic  EOMI, no scleral icterus  No respiratory distress, no cough  AAOx3, pleasant, cooperative  Left plantar wound with decreased callus but significant hemosiderin deposition and pressure injury.  Slightly deeper.  Small amount of blood noted.  No evidence of infection.  Right foot with large amount of necrotic skin and underlying tissue.  Mild odor, mild to moderate swelling along the plantar and lateral foot, mild warmth and erythema, no lymphangitis.  No purulent drainage present or able to be expressed.  No tunneling identified.    See photos for details.                  Result Review :  The following data was reviewed by: Shea Daniels MD on  04/27/2022:    Prior notes and images.    Wound debridement  Performed by: Shea Daniels MD  Authorized by: Shea aDniels MD     Correct patient: Identification verified by two methods:     Verbally and Date of birth  Correct procedure/consent    Correct site/side    Correct equipment as applicable    How many wounds are you performing a debridement on?:  2  Wound 1:     Nursing Documentation:     Wound Location:  Right foot  Measurements:     The total amount debrided on this wound was over 20 cm2.     Provider Documentation    Debridement type:  Sharp/excisional    Betadine      Other:  Sterile pickups and 10 blade     None    Tissue debrided:  Large    Type tissue:  Eschar and Slough    Level removed:  Muscle    Patient tolerance:  Good    Hemostasis achieved by:  Silver nitrate    Wound Bed Post Debridement: See Photo      Description:  44.1 cm² wound  Wound 2:     Wound Location:  Left foot   Measurements:    The total amount debrided on this wound was under 20 cm2.      Provider Documentation:     Debridement type:  Sharp/Excisional    Betadine      Other:  Sterile pickups and 10 blade     None    Tissue debrided:  Small    Level removed:  Subcutaneous    Patient tolerance:  Good    Hemostasis achieved by:  Silver Nitrate    Wound Bed Post Debridement: See Photo                      Assessment and Plan   Diagnoses and all orders for this visit:    1. Postoperative wound dehiscence, subsequent encounter (Primary)  -     Wound debridement  -     Wound Culture - Wound, Foot, Right  -     Ambulatory Referral to Home Health    2. Amputation of right midfoot (HCC)    3. 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 Diabetic polyneuropathy associated with type 2 diabetes mellitus (HCC)    5. High risk social situation    6. Cellulitis of right foot  -     doxycycline (VIBRAMYCIN) 100 MG  capsule; Take 1 capsule by mouth 2 (Two) Times a Day for 10 days.  Dispense: 20 capsule; Refill: 0      Aquacel Ag to right foot changed daily and padded well.  Offload distal right foot at all times!    Padmini packed into left plantar foot wound every 1 to 2 days and keep covered and padded.    Doxycycline for cellulitis and wound infection.  Culture pending.  I am concerned there could be an abscess forming based on the swelling along the plantar surface but there is no evidence of tunneling and no purulence to be expressed.  Will monitor closely.  He remains at very high risk for ending up with a BKA.  Based on the appearance of the wound after debridement today I feel that a trial of outpatient treatment is appropriate.    Recheck 1 week, sooner if problems arise.    Patient was given instructions and counseling regarding their condition or for health maintenance advice, as well as the wound care plan and recommendations. They understand and agree with the plan.  They will follow back up here in the clinic but are instructed to contact us in the interim should they have any new, returning, or worsening symptoms or concerns. Please see specific information pulled into the AVS if appropriate.     Dragon Dictation utilized for chart completion.    Follow Up   Return in about 1 week (around 5/4/2022) for Recheck.      Shea Daniels MD

## 2022-05-01 LAB
BACTERIA SPEC AEROBE CULT: ABNORMAL
GRAM STN SPEC: ABNORMAL

## 2022-05-02 DIAGNOSIS — T14.8XXA WOUND INFECTION: Primary | ICD-10-CM

## 2022-05-02 DIAGNOSIS — L08.9 WOUND INFECTION: Primary | ICD-10-CM

## 2022-05-02 RX ORDER — AMOXICILLIN AND CLAVULANATE POTASSIUM 875; 125 MG/1; MG/1
1 TABLET, FILM COATED ORAL 2 TIMES DAILY
Qty: 20 TABLET | Refills: 0 | Status: SHIPPED | OUTPATIENT
Start: 2022-05-02 | End: 2022-05-12

## 2022-05-04 ENCOUNTER — OFFICE VISIT (OUTPATIENT)
Dept: WOUND CARE | Facility: HOSPITAL | Age: 62
End: 2022-05-04

## 2022-05-04 VITALS
SYSTOLIC BLOOD PRESSURE: 156 MMHG | DIASTOLIC BLOOD PRESSURE: 80 MMHG | RESPIRATION RATE: 16 BRPM | HEART RATE: 87 BPM | TEMPERATURE: 96.5 F

## 2022-05-04 DIAGNOSIS — E11.42 DIABETIC POLYNEUROPATHY ASSOCIATED WITH TYPE 2 DIABETES MELLITUS: ICD-10-CM

## 2022-05-04 DIAGNOSIS — E11.621 DIABETIC ULCER OF LEFT HEEL ASSOCIATED WITH TYPE 2 DIABETES MELLITUS, WITH NECROSIS OF BONE: ICD-10-CM

## 2022-05-04 DIAGNOSIS — Z60.9 HIGH RISK SOCIAL SITUATION: ICD-10-CM

## 2022-05-04 DIAGNOSIS — S98.311A: ICD-10-CM

## 2022-05-04 DIAGNOSIS — L97.424 DIABETIC ULCER OF LEFT HEEL ASSOCIATED WITH TYPE 2 DIABETES MELLITUS, WITH NECROSIS OF BONE: ICD-10-CM

## 2022-05-04 DIAGNOSIS — T81.31XD POSTOPERATIVE WOUND DEHISCENCE, SUBSEQUENT ENCOUNTER: Primary | ICD-10-CM

## 2022-05-04 PROCEDURE — 11045 DBRDMT SUBQ TISS EACH ADDL: CPT | Performed by: EMERGENCY MEDICINE

## 2022-05-04 PROCEDURE — 11042 DBRDMT SUBQ TIS 1ST 20SQCM/<: CPT | Performed by: EMERGENCY MEDICINE

## 2022-05-04 PROCEDURE — 97597 DBRDMT OPN WND 1ST 20 CM/<: CPT | Performed by: EMERGENCY MEDICINE

## 2022-05-04 SDOH — SOCIAL STABILITY - SOCIAL INSECURITY: PROBLEM RELATED TO SOCIAL ENVIRONMENT, UNSPECIFIED: Z60.9

## 2022-05-04 NOTE — SIGNIFICANT NOTE
Epithelial %: 1-25%    Exposed Bone: no    Exposed Tendon: no    Impression: improved    Short Term Goals: INCREASE GRANULATION AND DECREASE SIZE

## 2022-05-04 NOTE — SIGNIFICANT NOTE
Epithelial %: 1-25%    Exposed Bone: no    Exposed Tendon: no    Impression: unchanged    Short Term Goals: INCREASE GRANULATION AND DECREASE SIZE

## 2022-05-04 NOTE — PROGRESS NOTES
Chief Complaint  Wound Check (Right foot distal wound (diabetic blood sugars low 200s))    Subjective      History of Present Illness    Jose Shaikh  is a 61 y.o. diabetic male with a right MTT amputation that is healing by secondary intention.  He also has a wound on the left plantar foot.  He has a difficult social situation due to financial constraints and housing issues.  He currently has a nurse from his [employer] coming over every day to do dressing changes and recently moved and now has home health once a week as well.      They have been doing Aquacel Ag to the right foot and packing Padmini into the left foot.  He has been trying to stay off of his feet as much as possible, especially the right one, and has been using a wheelchair more often than he had been previously.    Allergies:  Adhesive tape      Current Outpatient Medications:   •  alfuzosin (UROXATRAL) 10 MG 24 hr tablet, Take 10 mg by mouth Daily., Disp: , Rfl:   •  amoxicillin-clavulanate (Augmentin) 875-125 MG per tablet, Take 1 tablet by mouth 2 (Two) Times a Day for 10 days., Disp: 20 tablet, Rfl: 0  •  apixaban (ELIQUIS) 5 MG tablet tablet, Take 5 mg by mouth 2 (two) times a day., Disp: , Rfl:   •  buPROPion XL (WELLBUTRIN XL) 300 MG 24 hr tablet, Take 300 mg by mouth Every Morning., Disp: , Rfl:   •  citalopram (CeleXA) 10 MG tablet, Take 10 mg by mouth Every Night., Disp: , Rfl:   •  glucose blood test strip, Test blood sugar 3 times a day, Disp: 100 each, Rfl: 5  •  insulin aspart (NovoLOG) 100 UNIT/ML injection, Inject 20 Units under the skin into the appropriate area as directed 3 (Three) Times a Day Before Meals. Take up to 20 units as instructed, Disp: 20 mL, Rfl: 3  •  insulin detemir (LEVEMIR) 100 UNIT/ML injection, Inject 50 Units under the skin into the appropriate area as directed Every Night. Take 40 units and adjust to 50 as instructed, Disp: 20 mL, Rfl: 3  •  lisinopril (PRINIVIL,ZESTRIL) 20 MG tablet, Take 20 mg by mouth Daily.,  Disp: , Rfl:   •  metFORMIN (GLUCOPHAGE) 1000 MG tablet, Take 1,000 mg by mouth 2 (two) times a day., Disp: , Rfl:   •  metoprolol succinate XL (TOPROL-XL) 50 MG 24 hr tablet, Take 50 mg by mouth Daily., Disp: , Rfl:   •  sertraline (ZOLOFT) 100 MG tablet, Take 100 mg by mouth Daily., Disp: , Rfl:   •  simvastatin (ZOCOR) 20 MG tablet, Take 20 mg by mouth Every Night., Disp: , Rfl:   •  doxycycline (VIBRAMYCIN) 100 MG capsule, Take 1 capsule by mouth 2 (Two) Times a Day for 10 days., Disp: 20 capsule, Rfl: 0  •  HYDROcodone-acetaminophen (Norco) 5-325 MG per tablet, Take 1 tablet by mouth Every 4 (Four) Hours As Needed for Moderate Pain  for up to 18 doses., Disp: 18 tablet, Rfl: 0  •  pregabalin (LYRICA) 25 MG capsule, Take 1 capsule by mouth Every 12 (Twelve) Hours for 15 days., Disp: 30 capsule, Rfl: 0  •  sodium hypochlorite (DAKIN'S 1/4 STRENGTH) 0.125 % solution topical solution 0.125%, Apply 10 mL topically to the appropriate area as directed 2 (Two) Times a Day., Disp: 1000 mL, Rfl: 1    Past Medical History:   Diagnosis Date   • Absence of toe of right foot    • Acute osteomyelitis of left calcaneus  8/18/2021   • Anxiety and depression    • Arthritis    • Claustrophobia    • Corns and callus    • Diabetic ulcer of left heel associated with type 2 DM 8/18/2021   • Diabetic ulcer of left heel associated with type 2 DM 7/6/2021   • Diabetic ulcer of right midfoot associated with type 2 DM 8/18/2021   • Difficulty walking    • Essential hypertension 8/31/2021   • Hammertoe    • Hyperlipidemia LDL goal <100 8/31/2021   • Ingrown toenail    • Obesity    • Paroxysmal atrial fibrillation 8/31/2021   • Polyneuropathy    • Pressure ulcer, stage 1    • Tinea unguium    • Type 2 diabetes mellitus with polyneuropathy      Past Surgical History:   Procedure Laterality Date   • CYST REMOVAL      center of back; benign   • INCISION AND DRAINAGE ABSCESS      back   • INCISION AND DRAINAGE LEG Right 12/10/2021    Procedure:  INCISION AND DRAINAGE LOWER EXTREMITY;  Surgeon: Ash Leyva DPM;  Location: [facility] MAIN OR;  Service: Podiatry;  Laterality: Right;   • OTHER SURGICAL HISTORY      Surgical clips left foot   • TOE SURGERY Right     Removal of 5th toe   • TRANS METATARSAL AMPUTATION Right 2021    Procedure: AMPUTATION TRANS METATARSAL;  Surgeon: Ash Leyva DPM;  Location: [facility] MAIN OR;  Service: Podiatry;  Laterality: Right;   • WRIST SURGERY Left     repair of injury     Social History     Socioeconomic History   • Marital status: Single   Tobacco Use   • Smoking status: Former Smoker     Packs/day: 0.00     Years: 1.00     Pack years: 0.00     Quit date: 1991     Years since quittin.6   • Smokeless tobacco: Never Used   • Tobacco comment: quit at age 30   Vaping Use   • Vaping Use: Never used   Substance and Sexual Activity   • Alcohol use: Yes     Comment: Rare   • Drug use: Yes     Types: Marijuana     Comment: Daily   • Sexual activity: Defer           Objective     Vitals:    22 1120   BP: 156/80   BP Location: Left arm   Patient Position: Sitting   Pulse: 87   Resp: 16   Temp: 96.5 °F (35.8 °C)   TempSrc: Temporal   PainSc:   5   PainLoc: Foot  Comment: right foot like phantom pain per patient     There is no height or weight on file to calculate BMI.    STEADI Fall Risk Assessment has not been completed.     Review of Systems     ROS:  No fevers, chills, sweats, or body aches.     I have reviewed the HPI and ROS as documented by MA/RN. Shea Daniels MD    Physical Exam     NAD  Normocephalic, atraumatic  EOMI, no scleral icterus  No respiratory distress, no cough  AAOx3, pleasant, cooperative  Left plantar wound is smaller and exhibits decreased callus but still has significant hemosiderin deposition and pressure injury.  No evidence of infection.  Right foot is greatly improved from last week.  No evidence of infection.  Increased epithelialization around the edges and  decreased necrosis and pressure injury.  There are still several areas of deep pressure injury and devitalized tissues, especially along the inferior and lateral aspects of the wound and directly in the center, which are debrided.    See photos for details.                          Result Review :  The following data was reviewed by: Shea Daniels MD on 05/04/2022:    Prior notes and images.    Wound debridement  Performed by: Shea Daniels MD  Authorized by: Shea Daniels MD     Correct patient: Identification verified by two methods:     Verbally and Date of birth  Correct procedure/consent    Correct site/side    Correct equipment as applicable    How many wounds are you performing a debridement on?:  2  Wound 1:     Nursing Documentation:     Wound Location:  Right foot  Measurements:     The total amount debrided on this wound was over 20 cm2.     Provider Documentation    Debridement type:  Sharp/excisional    Betadine      Other:  Jeremie 10 blade     None    Tissue debrided:  Moderate    Type tissue:  Slough and Eschar    Level removed:  Subcutaneous    Patient tolerance:  Good    Hemostasis achieved by:  Direct pressure and Silver nitrate    Description:  42.84 cm²  No post debridement photo available  Wound 2:     Wound Location:  Left plantar foot   Measurements:    The total amount debrided on this wound was under 20 cm2.      Provider Documentation:     Debridement type:  Sharp/Excisional    Betadine      Other:  Sterile 10 blade     None    Tissue debrided:  Small    Level removed:  Skin    Patient tolerance:  Good    Hemostasis achieved by:  Direct Pressure    Description:  No post debridement photo available              Assessment and Plan   Diagnoses and all orders for this visit:    1. Postoperative wound dehiscence, subsequent encounter (Primary)    2. Amputation of right midfoot (HCC)    3. Diabetic ulcer of left heel associated with type 2 diabetes mellitus, with necrosis of bone  (HCC)    4. Diabetic polyneuropathy associated with type 2 diabetes mellitus (HCC)    5. High risk social situation    Other orders  -     Wound debridement      Continue Aquacel Ag to right foot changed daily and padded well.      Offload distal right foot at all times!    Padmini packed into left plantar foot wound every 1 to 2 days and keep covered and padded.    Finish full course of Augmentin.    Recheck 1 week, sooner if problems arise.    Patient was given instructions and counseling regarding their condition or for health maintenance advice, as well as the wound care plan and recommendations. They understand and agree with the plan.  They will follow back up here in the clinic but are instructed to contact us in the interim should they have any new, returning, or worsening symptoms or concerns. Please see specific information pulled into the AVS if appropriate.     Dragon Dictation utilized for chart completion.    Follow Up   Return in about 2 weeks (around 5/18/2022) for Recheck.      Shea Daniels MD

## 2022-05-18 ENCOUNTER — OFFICE VISIT (OUTPATIENT)
Dept: WOUND CARE | Facility: HOSPITAL | Age: 62
End: 2022-05-18

## 2022-05-18 VITALS
BODY MASS INDEX: 44.1 KG/M2 | DIASTOLIC BLOOD PRESSURE: 66 MMHG | HEART RATE: 81 BPM | RESPIRATION RATE: 18 BRPM | TEMPERATURE: 97.5 F | HEIGHT: 71 IN | SYSTOLIC BLOOD PRESSURE: 124 MMHG | WEIGHT: 315 LBS

## 2022-05-18 DIAGNOSIS — S98.311A: ICD-10-CM

## 2022-05-18 DIAGNOSIS — L97.424 DIABETIC ULCER OF LEFT HEEL ASSOCIATED WITH TYPE 2 DIABETES MELLITUS, WITH NECROSIS OF BONE: ICD-10-CM

## 2022-05-18 DIAGNOSIS — Z60.9 HIGH RISK SOCIAL SITUATION: ICD-10-CM

## 2022-05-18 DIAGNOSIS — T81.31XD POSTOPERATIVE WOUND DEHISCENCE, SUBSEQUENT ENCOUNTER: Primary | ICD-10-CM

## 2022-05-18 DIAGNOSIS — E11.621 DIABETIC ULCER OF LEFT HEEL ASSOCIATED WITH TYPE 2 DIABETES MELLITUS, WITH NECROSIS OF BONE: ICD-10-CM

## 2022-05-18 DIAGNOSIS — E66.01 CLASS 3 SEVERE OBESITY DUE TO EXCESS CALORIES WITH SERIOUS COMORBIDITY AND BODY MASS INDEX (BMI) OF 45.0 TO 49.9 IN ADULT: ICD-10-CM

## 2022-05-18 DIAGNOSIS — E11.42 DIABETIC POLYNEUROPATHY ASSOCIATED WITH TYPE 2 DIABETES MELLITUS: ICD-10-CM

## 2022-05-18 PROCEDURE — 11045 DBRDMT SUBQ TISS EACH ADDL: CPT | Performed by: EMERGENCY MEDICINE

## 2022-05-18 PROCEDURE — 97597 DBRDMT OPN WND 1ST 20 CM/<: CPT | Performed by: EMERGENCY MEDICINE

## 2022-05-18 PROCEDURE — 11042 DBRDMT SUBQ TIS 1ST 20SQCM/<: CPT | Performed by: EMERGENCY MEDICINE

## 2022-05-18 SDOH — SOCIAL STABILITY - SOCIAL INSECURITY: PROBLEM RELATED TO SOCIAL ENVIRONMENT, UNSPECIFIED: Z60.9

## 2022-05-18 NOTE — PROGRESS NOTES
Chief Complaint  Wound Check (BILATERAL FOOT WOUNDS (BS: 145))    Subjective      History of Present Illness    Jose Shaikh  is a 61 y.o. diabetic male with a right MTT amputation that is healing by secondary intention.  He also has a wound on the left plantar foot.  He has a difficult social situation due to financial constraints and housing issues.  He currently has a nurse from his [employer] coming over every day to do dressing changes and recently moved and now has home health once a week as well.      They have been doing Aquacel Ag to the right foot and packing Padmini into the left foot.  He has been trying to stay off of his feet as much as possible, especially the right one, and has been using a wheelchair some, but is using his walker again more.    Allergies:  Adhesive tape      Current Outpatient Medications:   •  alfuzosin (UROXATRAL) 10 MG 24 hr tablet, Take 10 mg by mouth Daily., Disp: , Rfl:   •  apixaban (ELIQUIS) 5 MG tablet tablet, Take 5 mg by mouth 2 (two) times a day., Disp: , Rfl:   •  buPROPion XL (WELLBUTRIN XL) 300 MG 24 hr tablet, Take 300 mg by mouth Every Morning., Disp: , Rfl:   •  citalopram (CeleXA) 10 MG tablet, Take 10 mg by mouth Every Night., Disp: , Rfl:   •  glucose blood test strip, Test blood sugar 3 times a day, Disp: 100 each, Rfl: 5  •  HYDROcodone-acetaminophen (Norco) 5-325 MG per tablet, Take 1 tablet by mouth Every 4 (Four) Hours As Needed for Moderate Pain  for up to 18 doses., Disp: 18 tablet, Rfl: 0  •  insulin aspart (NovoLOG) 100 UNIT/ML injection, Inject 20 Units under the skin into the appropriate area as directed 3 (Three) Times a Day Before Meals. Take up to 20 units as instructed, Disp: 20 mL, Rfl: 3  •  insulin detemir (LEVEMIR) 100 UNIT/ML injection, Inject 50 Units under the skin into the appropriate area as directed Every Night. Take 40 units and adjust to 50 as instructed, Disp: 20 mL, Rfl: 3  •  lisinopril (PRINIVIL,ZESTRIL) 20 MG tablet, Take 20 mg by  mouth Daily., Disp: , Rfl:   •  metFORMIN (GLUCOPHAGE) 1000 MG tablet, Take 1,000 mg by mouth 2 (two) times a day., Disp: , Rfl:   •  metoprolol succinate XL (TOPROL-XL) 50 MG 24 hr tablet, Take 50 mg by mouth Daily., Disp: , Rfl:   •  pregabalin (LYRICA) 25 MG capsule, Take 1 capsule by mouth Every 12 (Twelve) Hours for 15 days., Disp: 30 capsule, Rfl: 0  •  sertraline (ZOLOFT) 100 MG tablet, Take 100 mg by mouth Daily., Disp: , Rfl:   •  simvastatin (ZOCOR) 20 MG tablet, Take 20 mg by mouth Every Night., Disp: , Rfl:   •  sodium hypochlorite (DAKIN'S 1/4 STRENGTH) 0.125 % solution topical solution 0.125%, Apply 10 mL topically to the appropriate area as directed 2 (Two) Times a Day., Disp: 1000 mL, Rfl: 1    Past Medical History:   Diagnosis Date   • Absence of toe of right foot    • Acute osteomyelitis of left calcaneus  8/18/2021   • Anxiety and depression    • Arthritis    • Claustrophobia    • Corns and callus    • Diabetic ulcer of left heel associated with type 2 DM 8/18/2021   • Diabetic ulcer of left heel associated with type 2 DM 7/6/2021   • Diabetic ulcer of right midfoot associated with type 2 DM 8/18/2021   • Difficulty walking    • Essential hypertension 8/31/2021   • Hammertoe    • Hyperlipidemia LDL goal <100 8/31/2021   • Ingrown toenail    • Obesity    • Paroxysmal atrial fibrillation 8/31/2021   • Polyneuropathy    • Pressure ulcer, stage 1    • Tinea unguium    • Type 2 diabetes mellitus with polyneuropathy      Past Surgical History:   Procedure Laterality Date   • CYST REMOVAL      center of back; benign   • INCISION AND DRAINAGE ABSCESS      back   • INCISION AND DRAINAGE LEG Right 12/10/2021    Procedure: INCISION AND DRAINAGE LOWER EXTREMITY;  Surgeon: Ash Leyva DPM;  Location: [facility] MAIN OR;  Service: Podiatry;  Laterality: Right;   • OTHER SURGICAL HISTORY      Surgical clips left foot   • TOE SURGERY Right     Removal of 5th toe   • TRANS METATARSAL AMPUTATION Right  "2021    Procedure: AMPUTATION TRANS METATARSAL;  Surgeon: Ash Leyva DPM;  Location: [facility] MAIN OR;  Service: Podiatry;  Laterality: Right;   • WRIST SURGERY Left     repair of injury     Social History     Socioeconomic History   • Marital status: Single   Tobacco Use   • Smoking status: Former Smoker     Packs/day: 0.00     Years: 1.00     Pack years: 0.00     Quit date: 1991     Years since quittin.7   • Smokeless tobacco: Never Used   • Tobacco comment: quit at age 30   Vaping Use   • Vaping Use: Never used   Substance and Sexual Activity   • Alcohol use: Yes     Comment: Rare   • Drug use: Yes     Types: Marijuana     Comment: Daily   • Sexual activity: Defer           Objective     Vitals:    22 1311   BP: 124/66   BP Location: Left arm   Patient Position: Sitting   Pulse: 81   Resp: 18   Temp: 97.5 °F (36.4 °C)   TempSrc: Temporal   Weight: (!) 155 kg (341 lb)   Height: 180.3 cm (71\")   PainSc:   7     Body mass index is 47.56 kg/m².    STEADI Fall Risk Assessment has not been completed.     Review of Systems     ROS:  No fevers, chills, sweats, or body aches.     I have reviewed the HPI and ROS as documented by MA/RN. Shea Daniels MD    Physical Exam     NAD  Normocephalic, atraumatic  EOMI, no scleral icterus  No respiratory distress, no cough  AAOx3, pleasant, cooperative  Left plantar wound is somewhat smaller again and exhibits decreased callus, but still has significant hemosiderin deposition and deep pressure injury.  TTP with deep palpation but no pain with sharp debridement.  No evidence of infection.  Right foot is greatly improved from last week.  No evidence of infection.  Increased epithelialization around the edges and decreased necrosis and pressure injury.  There are still several areas of deep pressure injury and devitalized tissues, especially along the inferior and lateral aspects of the wound and directly in the center, which are debrided. Moderate " maceration of the tissues along the medial and lateral aspect of the healing wound underlying the callus and bruised tissue.     See photos for details.                Result Review :  The following data was reviewed by: Shea Daniels MD on 05/18/2022:    Prior notes and images.    Wound debridement  Performed by: Shea Daniels MD  Authorized by: Shea Daniels MD     Correct patient: Identification verified by two methods:     Verbally and Date of birth  Correct procedure/consent    Correct site/side    Correct equipment as applicable    How many wounds are you performing a debridement on?:  2  Wound 1:     Nursing Documentation:     Wound Location:  Right foot  Measurements:     The total amount debrided on this wound was over 20 cm2.     Provider Documentation    Debridement type:  Sharp/excisional    Betadine      Other:  Sterile 10 blade     None    Tissue debrided:  Moderate    Level removed:  Subcutaneous    Patient tolerance:  Good    Hemostasis achieved by:  Silver nitrate and Direct pressure    Wound Bed Post Debridement: See Photo      Description:  36 cm²  Wound 2:     Wound Location:  Left plantar foot   Measurements:    The total amount debrided on this wound was under 20 cm2.      Provider Documentation:     Debridement type:  Sharp/Excisional    Betadine      Other:  Sterile 10 blade     None    Tissue debrided:  Moderate    Level removed:  Skin    Patient tolerance:  Good    Hemostasis achieved by:  Direct Pressure    Wound Bed Post Debridement: See Photo                    Assessment and Plan   Diagnoses and all orders for this visit:    1. Postoperative wound dehiscence, subsequent encounter (Primary)  -     Wound debridement    2. Amputation of right midfoot (HCC)    3. Diabetic ulcer of left heel associated with type 2 diabetes mellitus, with necrosis of bone (HCC)  -     Wound debridement    4. Diabetic polyneuropathy associated with type 2 diabetes mellitus (HCC)    5. High risk social  situation    6. Class 3 severe obesity due to excess calories with serious comorbidity and body mass index (BMI) of 45.0 to 49.9 in adult (HCC)      Betadine to macerated healed periwound tissues daily.  Padmini to the central healing area of wound.    Offload distal right foot at all times!    Padmini packed into left plantar foot wound every 1 to 2 days and keep covered and padded.  I cut out the insole of his shoe in the area of the wound today and he is advised to do that for any other shoes that he wears regularly to try and offload this wound.    Finish full course of Augmentin.  He only has a few more doses left.    Recheck 3 weeks, sooner if problems arise.    Patient was given instructions and counseling regarding their condition or for health maintenance advice, as well as the wound care plan and recommendations. They understand and agree with the plan.  They will follow back up here in the clinic but are instructed to contact us in the interim should they have any new, returning, or worsening symptoms or concerns. Please see specific information pulled into the AVS if appropriate.     Dragon Dictation utilized for chart completion.    Follow Up   Return in about 3 weeks (around 6/8/2022) for Recheck.      Shea Daniels MD

## 2022-05-23 RX ORDER — DULAGLUTIDE 1.5 MG/.5ML
INJECTION, SOLUTION SUBCUTANEOUS
Qty: 2 ML | Refills: 2 | Status: SHIPPED | OUTPATIENT
Start: 2022-05-23 | End: 2022-08-25

## 2022-06-06 ENCOUNTER — APPOINTMENT (OUTPATIENT)
Dept: DIABETES SERVICES | Facility: HOSPITAL | Age: 62
End: 2022-06-06

## 2022-06-06 NOTE — PROGRESS NOTES
"Chief Complaint  No chief complaint on file.    Referred By: Corazon Gr MD    Subjective     {Problem List  Visit Diagnosis   Encounters  Notes  Medications  Labs  Result Review Imaging  Media :23}     Jose Shaikh presents to [facility] for diabetes medication management    History of Present Illness    Visit type:  follow-up  Diabetes type:  Type 2  Current diabetes status/concerns/issues: This patient was last seen in 2021  Other health concerns: ***  Diabetes symptoms:    Polyuria: {Yes No:21034::\"No\"}   Polydipsia: {Yes No:47943::\"No\"}   Polyphagia: {Yes No:70327::\"No\"}   Blurred vision: {Yes No:34141::\"No\"}   Excessive fatigue: {Yes No:23278::\"No\"}  Diabetes complications:  Neuro: Neuropathy in the lower extremities  Renal: None  Eyes: None  Amputation/Wounds: nonhealing ulcer on the left heel and right MTT amputation  GI: None  Cardiovascular: Hypertension  ED: None  Other: None  Hypoglycemia:  {Hypoglycemia:42036}  Hypoglycemia Symptoms:  {Hypoglycemia Symptoms:45715}  Current diabetes treatment:  ***Levemir 50 units in the evening with NovoLog taking 10, 15, or 20 units based on small, medium, or large sized meals plus correction for glucose levels greater than 150 mg/dL starting with 2 units and increasing by 1 unit for every 25 mg/dL thereafter.  Trulicity 1.5 mg once weekly  Blood glucose device:  {BG Monitorin}  Blood glucose monitoring frequency:  {Glucose Monitoring Frequency:54499}  Blood glucose range/average:  ***  Diet:  {Diabetes Diet Plan:08670}  Activity/Exercise:  {DM Physical Activity:88494}    Past Medical History:   Diagnosis Date   • Absence of toe of right foot    • Acute osteomyelitis of left calcaneus  2021   • Anxiety and depression    • Arthritis    • Claustrophobia    • Corns and callus    • Diabetic ulcer of left heel associated with type 2 DM 2021   • Diabetic ulcer of left heel associated with type 2 DM " 2021   • Diabetic ulcer of right midfoot associated with type 2 DM 2021   • Difficulty walking    • Essential hypertension 2021   • Hammertoe    • Hyperlipidemia LDL goal <100 2021   • Ingrown toenail    • Obesity    • Paroxysmal atrial fibrillation 2021   • Polyneuropathy    • Pressure ulcer, stage 1    • Tinea unguium    • Type 2 diabetes mellitus with polyneuropathy      Past Surgical History:   Procedure Laterality Date   • CYST REMOVAL      center of back; benign   • INCISION AND DRAINAGE ABSCESS      back   • INCISION AND DRAINAGE LEG Right 12/10/2021    Procedure: INCISION AND DRAINAGE LOWER EXTREMITY;  Surgeon: Ash Leyva DPM;  Location: [facility] MAIN OR;  Service: Podiatry;  Laterality: Right;   • OTHER SURGICAL HISTORY      Surgical clips left foot   • TOE SURGERY Right     Removal of 5th toe   • TRANS METATARSAL AMPUTATION Right 2021    Procedure: AMPUTATION TRANS METATARSAL;  Surgeon: Ash Leyva DPM;  Location: [facility] MAIN OR;  Service: Podiatry;  Laterality: Right;   • WRIST SURGERY Left     repair of injury     Family History   Problem Relation Age of Onset   • Heart disease Mother    • Heart disease Father    • Cancer Father         Unspecified   • Heart disease Brother      Social History     Socioeconomic History   • Marital status: Single   Tobacco Use   • Smoking status: Former Smoker     Packs/day: 0.00     Years: 1.00     Pack years: 0.00     Quit date: 1991     Years since quittin.7   • Smokeless tobacco: Never Used   • Tobacco comment: quit at age 30   Vaping Use   • Vaping Use: Never used   Substance and Sexual Activity   • Alcohol use: Yes     Comment: Rare   • Drug use: Yes     Types: Marijuana     Comment: Daily   • Sexual activity: Defer     Allergies   Allergen Reactions   • Adhesive Tape Rash       Current Outpatient Medications:   •  alfuzosin (UROXATRAL) 10 MG 24 hr tablet, Take 10 mg by mouth Daily., Disp: , Rfl:   •   apixaban (ELIQUIS) 5 MG tablet tablet, Take 5 mg by mouth 2 (two) times a day., Disp: , Rfl:   •  buPROPion XL (WELLBUTRIN XL) 300 MG 24 hr tablet, Take 300 mg by mouth Every Morning., Disp: , Rfl:   •  citalopram (CeleXA) 10 MG tablet, Take 10 mg by mouth Every Night., Disp: , Rfl:   •  glucose blood test strip, Test blood sugar 3 times a day, Disp: 100 each, Rfl: 5  •  HYDROcodone-acetaminophen (Norco) 5-325 MG per tablet, Take 1 tablet by mouth Every 4 (Four) Hours As Needed for Moderate Pain  for up to 18 doses., Disp: 18 tablet, Rfl: 0  •  insulin aspart (NovoLOG) 100 UNIT/ML injection, Inject 20 Units under the skin into the appropriate area as directed 3 (Three) Times a Day Before Meals. Take up to 20 units as instructed, Disp: 20 mL, Rfl: 3  •  insulin detemir (LEVEMIR) 100 UNIT/ML injection, Inject 50 Units under the skin into the appropriate area as directed Every Night. Take 40 units and adjust to 50 as instructed, Disp: 20 mL, Rfl: 3  •  lisinopril (PRINIVIL,ZESTRIL) 20 MG tablet, Take 20 mg by mouth Daily., Disp: , Rfl:   •  metFORMIN (GLUCOPHAGE) 1000 MG tablet, Take 1,000 mg by mouth 2 (two) times a day., Disp: , Rfl:   •  metoprolol succinate XL (TOPROL-XL) 50 MG 24 hr tablet, Take 50 mg by mouth Daily., Disp: , Rfl:   •  pregabalin (LYRICA) 25 MG capsule, Take 1 capsule by mouth Every 12 (Twelve) Hours for 15 days., Disp: 30 capsule, Rfl: 0  •  sertraline (ZOLOFT) 100 MG tablet, Take 100 mg by mouth Daily., Disp: , Rfl:   •  simvastatin (ZOCOR) 20 MG tablet, Take 20 mg by mouth Every Night., Disp: , Rfl:   •  sodium hypochlorite (DAKIN'S 1/4 STRENGTH) 0.125 % solution topical solution 0.125%, Apply 10 mL topically to the appropriate area as directed 2 (Two) Times a Day., Disp: 1000 mL, Rfl: 1  •  Trulicity 1.5 MG/0.5ML solution pen-injector, INJET ONE SYRINGE INTO THE SKIN WEEKLY, Disp: 2 mL, Rfl: 2    Review of Systems     Objective     There were no vitals filed for this visit.  There is no height  or weight on file to calculate BMI.    Physical Exam    Result Review :{Labs  Result Review  Imaging  Med Tab  Media :23}   The following data was reviewed by: VALENTÍN Covington on 06/06/2022:    ***    Most Recent A1C    HGBA1C Most Recent 8/24/21   Hemoglobin A1C 8.50 (A)   (A) Abnormal value              A1C Last 3 Results    HGBA1C Last 3 Results 8/24/21   Hemoglobin A1C 8.50 (A)   (A) Abnormal value              Glucose   Date Value Ref Range Status   12/21/2021 218 (H) 70 - 99 mg/dL Final     Comment:     Serial Number: [id number]:  [id number]        {CC Problem List  Visit Diagnosis  ROS  Review (Popup)  Health Maintenance  Quality  BestPractice  Medications  SmartSets  SnapShot Encounters  Media :23}     Assessment: ***      There are no diagnoses linked to this encounter.    Plan: ***    The patient will monitor his blood glucose levels ***.  If he develops problematic hyperglycemia or hypoglycemia or adverse drug reactions, he will contact the office for further instructions.    {Time Spent (Optional):20936}    Follow Up {Instructions Charge Capture  Follow-up Communications :23}    No follow-ups on file.    Patient was given instructions and counseling regarding his condition or for health maintenance advice. Please see specific information pulled into the AVS if appropriate.     VALENTÍN Covington  06/06/2022

## 2022-06-08 ENCOUNTER — OFFICE VISIT (OUTPATIENT)
Dept: WOUND CARE | Facility: HOSPITAL | Age: 62
End: 2022-06-08

## 2022-06-08 VITALS
HEART RATE: 95 BPM | TEMPERATURE: 97.8 F | DIASTOLIC BLOOD PRESSURE: 68 MMHG | SYSTOLIC BLOOD PRESSURE: 118 MMHG | RESPIRATION RATE: 16 BRPM

## 2022-06-08 DIAGNOSIS — E11.621 DIABETIC ULCER OF LEFT HEEL ASSOCIATED WITH TYPE 2 DIABETES MELLITUS, WITH NECROSIS OF BONE: ICD-10-CM

## 2022-06-08 DIAGNOSIS — E66.01 CLASS 3 SEVERE OBESITY DUE TO EXCESS CALORIES WITH SERIOUS COMORBIDITY AND BODY MASS INDEX (BMI) OF 45.0 TO 49.9 IN ADULT: ICD-10-CM

## 2022-06-08 DIAGNOSIS — E11.42 DIABETIC POLYNEUROPATHY ASSOCIATED WITH TYPE 2 DIABETES MELLITUS: ICD-10-CM

## 2022-06-08 DIAGNOSIS — T81.31XD POSTOPERATIVE WOUND DEHISCENCE, SUBSEQUENT ENCOUNTER: Primary | ICD-10-CM

## 2022-06-08 DIAGNOSIS — S98.311A: ICD-10-CM

## 2022-06-08 DIAGNOSIS — Z60.9 HIGH RISK SOCIAL SITUATION: ICD-10-CM

## 2022-06-08 DIAGNOSIS — L97.424 DIABETIC ULCER OF LEFT HEEL ASSOCIATED WITH TYPE 2 DIABETES MELLITUS, WITH NECROSIS OF BONE: ICD-10-CM

## 2022-06-08 PROCEDURE — 11042 DBRDMT SUBQ TIS 1ST 20SQCM/<: CPT | Performed by: EMERGENCY MEDICINE

## 2022-06-08 PROCEDURE — 11045 DBRDMT SUBQ TISS EACH ADDL: CPT | Performed by: EMERGENCY MEDICINE

## 2022-06-08 SDOH — SOCIAL STABILITY - SOCIAL INSECURITY: PROBLEM RELATED TO SOCIAL ENVIRONMENT, UNSPECIFIED: Z60.9

## 2022-06-08 NOTE — SIGNIFICANT NOTE
Epithelial %: 0%    Exposed Bone: no    Exposed Tendon: no    Impression: improved    Short Term Goals: Short term goal: Increase granultion and decrease size      
Epithelial %: 0%    Exposed Bone: no    Exposed Tendon: no    Impression: improved    Short Term Goals: Short term goal: Increase granultion and decrease size        
spontaneous/unlabored/dyspnea upon exertion

## 2022-06-08 NOTE — PROGRESS NOTES
Chief Complaint  Wound Check (Wound check to bilateral feet ( )/)    Subjective      History of Present Illness    Jose Shaikh  is a 61 y.o. diabetic male with a right MTT amputation that is healing by secondary intention.  He also has a wound on the left plantar foot.  He has a difficult social situation due to financial constraints and housing issues.  He currently has a nurse from his [employer] coming over every day to do dressing changes and recently moved and now has home health once a week as well.      They have been doing Aquacel Ag to the right foot and packing Padmini into the left foot.  He has been trying to stay off of his feet as much as possible, especially the right one, and has been using a wheelchair some, but is using his walker again more.  His wheelchair does not fit well and his bathroom so he does have to walk on it.    Allergies:  Adhesive tape      Current Outpatient Medications:   •  alfuzosin (UROXATRAL) 10 MG 24 hr tablet, Take 10 mg by mouth Daily., Disp: , Rfl:   •  apixaban (ELIQUIS) 5 MG tablet tablet, Take 5 mg by mouth 2 (two) times a day., Disp: , Rfl:   •  buPROPion XL (WELLBUTRIN XL) 300 MG 24 hr tablet, Take 300 mg by mouth Every Morning., Disp: , Rfl:   •  citalopram (CeleXA) 10 MG tablet, Take 10 mg by mouth Every Night., Disp: , Rfl:   •  glucose blood test strip, Test blood sugar 3 times a day, Disp: 100 each, Rfl: 5  •  HYDROcodone-acetaminophen (Norco) 5-325 MG per tablet, Take 1 tablet by mouth Every 4 (Four) Hours As Needed for Moderate Pain  for up to 18 doses., Disp: 18 tablet, Rfl: 0  •  insulin aspart (NovoLOG) 100 UNIT/ML injection, Inject 20 Units under the skin into the appropriate area as directed 3 (Three) Times a Day Before Meals. Take up to 20 units as instructed, Disp: 20 mL, Rfl: 3  •  insulin detemir (LEVEMIR) 100 UNIT/ML injection, Inject 50 Units under the skin into the appropriate area as directed Every Night. Take 40 units and adjust to 50 as  instructed, Disp: 20 mL, Rfl: 3  •  lisinopril (PRINIVIL,ZESTRIL) 20 MG tablet, Take 20 mg by mouth Daily., Disp: , Rfl:   •  metFORMIN (GLUCOPHAGE) 1000 MG tablet, Take 1,000 mg by mouth 2 (two) times a day., Disp: , Rfl:   •  metoprolol succinate XL (TOPROL-XL) 50 MG 24 hr tablet, Take 50 mg by mouth Daily., Disp: , Rfl:   •  pregabalin (LYRICA) 25 MG capsule, Take 1 capsule by mouth Every 12 (Twelve) Hours for 15 days., Disp: 30 capsule, Rfl: 0  •  sertraline (ZOLOFT) 100 MG tablet, Take 100 mg by mouth Daily., Disp: , Rfl:   •  simvastatin (ZOCOR) 20 MG tablet, Take 20 mg by mouth Every Night., Disp: , Rfl:   •  sodium hypochlorite (DAKIN'S 1/4 STRENGTH) 0.125 % solution topical solution 0.125%, Apply 10 mL topically to the appropriate area as directed 2 (Two) Times a Day., Disp: 1000 mL, Rfl: 1  •  Trulicity 1.5 MG/0.5ML solution pen-injector, INJET ONE SYRINGE INTO THE SKIN WEEKLY, Disp: 2 mL, Rfl: 2    Past Medical History:   Diagnosis Date   • Absence of toe of right foot    • Acute osteomyelitis of left calcaneus  8/18/2021   • Anxiety and depression    • Arthritis    • Claustrophobia    • Corns and callus    • Diabetic ulcer of left heel associated with type 2 DM 8/18/2021   • Diabetic ulcer of left heel associated with type 2 DM 7/6/2021   • Diabetic ulcer of right midfoot associated with type 2 DM 8/18/2021   • Difficulty walking    • Essential hypertension 8/31/2021   • Hammertoe    • Hyperlipidemia LDL goal <100 8/31/2021   • Ingrown toenail    • Obesity    • Paroxysmal atrial fibrillation 8/31/2021   • Polyneuropathy    • Pressure ulcer, stage 1    • Tinea unguium    • Type 2 diabetes mellitus with polyneuropathy      Past Surgical History:   Procedure Laterality Date   • CYST REMOVAL      center of back; benign   • INCISION AND DRAINAGE ABSCESS      back   • INCISION AND DRAINAGE LEG Right 12/10/2021    Procedure: INCISION AND DRAINAGE LOWER EXTREMITY;  Surgeon: Ash Leyva DPM;   Location: [facility] OR;  Service: Podiatry;  Laterality: Right;   • OTHER SURGICAL HISTORY      Surgical clips left foot   • TOE SURGERY Right     Removal of 5th toe   • TRANS METATARSAL AMPUTATION Right 2021    Procedure: AMPUTATION TRANS METATARSAL;  Surgeon: Ash Leyva DPM;  Location: [facility] MAIN OR;  Service: Podiatry;  Laterality: Right;   • WRIST SURGERY Left     repair of injury     Social History     Socioeconomic History   • Marital status: Single   Tobacco Use   • Smoking status: Former Smoker     Packs/day: 0.00     Years: 1.00     Pack years: 0.00     Quit date: 1991     Years since quittin.7   • Smokeless tobacco: Never Used   • Tobacco comment: quit at age 30   Vaping Use   • Vaping Use: Never used   Substance and Sexual Activity   • Alcohol use: Yes     Comment: Rare   • Drug use: Yes     Types: Marijuana     Comment: Daily   • Sexual activity: Defer           Objective     Vitals:    22 1328   BP: 118/68   BP Location: Right arm   Patient Position: Sitting   Pulse: 95   Resp: 16   Temp: 97.8 °F (36.6 °C)   TempSrc: Temporal   PainSc: 0-No pain     There is no height or weight on file to calculate BMI.    STEADI Fall Risk Assessment has not been completed.     Review of Systems     ROS:  No fevers, chills, sweats, or body aches.     I have reviewed the HPI and ROS as documented by MA/RN. Shea Daniels MD    Physical Exam     NAD  Normocephalic, atraumatic  EOMI, no scleral icterus  No respiratory distress, no cough  AAOx3, pleasant, cooperative  Left plantar wound is somewhat smaller again and exhibits decreased callus, but still has significant hemosiderin deposition and deep pressure injury.  TTP with deep palpation but no pain with sharp debridement.  No evidence of infection.  Right foot is greatly improved from last week.  No evidence of infection.  Increased epithelialization around the edges and decreased necrosis and pressure injury.  There are still  several areas of deep pressure injury and devitalized tissues, especially along the inferior and lateral aspects of the wound and directly in the center, which are debrided. Moderate maceration of the tissues along the medial and lateral aspect of the healing wound underlying the callus and bruised tissue.     See photos for details.                            Result Review :  The following data was reviewed by: Shea Daniels MD on 06/08/2022:    Prior notes and images.    Wound debridement  Performed by: Shea Daniels MD  Authorized by: Shea Daniels MD     Correct patient: Identification verified by two methods:     Verbally and Date of birth  Correct procedure/consent    Correct site/side    Correct equipment as applicable    How many wounds are you performing a debridement on?:  2  Wound 1:     Nursing Documentation:     Wound Location:  Right TMA  Measurements:     The total amount debrided on this wound was over 20 cm2.     Provider Documentation    Debridement type:  Sharp/excisional    Betadine      Other:  Sterile 10 blade     None    Tissue debrided:  Large    Level removed:  Subcutaneous    Patient tolerance:  Good    Hemostasis achieved by:  Silver nitrate    Wound Bed Post Debridement: See Photo    Wound 2:     Wound Location:  Left plantar foot   Measurements:    The total amount debrided on this wound was under 20 cm2.      Provider Documentation:     Debridement type:  Sharp/Excisional    Betadine      Other:  Sterile 10 blade     None    Tissue debrided:  Large    Level removed:  Subcutaneous    Patient tolerance:  Good    Hemostasis achieved by:  Silver Nitrate    Wound Bed Post Debridement: See Photo      Description:  Total combined subcutaneous debridement area 43.24    Wounds packed with Padmini prior to photos being taken.                           Assessment and Plan   Diagnoses and all orders for this visit:    1. Postoperative wound dehiscence, subsequent encounter (Primary)    2.  Amputation of right midfoot (HCC)    3. 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 Diabetic polyneuropathy associated with type 2 diabetes mellitus (HCC)    5. Class 3 severe obesity due to excess calories with serious comorbidity and body mass index (BMI) of 45.0 to 49.9 in adult (HCC)    6. High risk social situation    Other orders  -     Wound debridement        Betadine to macerated healed periwound tissues daily.  Aquacel Ag to the central healing area of wound.    Offload distal right foot at all times!    Padmini packed into left plantar foot wound every 1 to 2 days and keep covered and padded.     Recheck 3 weeks, sooner if problems arise.    Patient was given instructions and counseling regarding their condition or for health maintenance advice, as well as the wound care plan and recommendations. They understand and agree with the plan.  They will follow back up here in the clinic but are instructed to contact us in the interim should they have any new, returning, or worsening symptoms or concerns. Please see specific information pulled into the AVS if appropriate.     Dragon Dictation utilized for chart completion.    Follow Up   Return in about 3 weeks (around 6/29/2022) for Recheck.      Shea Daniels MD

## 2022-06-29 ENCOUNTER — OFFICE VISIT (OUTPATIENT)
Dept: WOUND CARE | Facility: HOSPITAL | Age: 62
End: 2022-06-29

## 2022-06-29 VITALS
SYSTOLIC BLOOD PRESSURE: 120 MMHG | TEMPERATURE: 97 F | HEART RATE: 74 BPM | DIASTOLIC BLOOD PRESSURE: 68 MMHG | RESPIRATION RATE: 16 BRPM

## 2022-06-29 DIAGNOSIS — E11.42 DIABETIC POLYNEUROPATHY ASSOCIATED WITH TYPE 2 DIABETES MELLITUS: ICD-10-CM

## 2022-06-29 DIAGNOSIS — S98.311A: ICD-10-CM

## 2022-06-29 DIAGNOSIS — E66.01 CLASS 3 SEVERE OBESITY DUE TO EXCESS CALORIES WITH SERIOUS COMORBIDITY AND BODY MASS INDEX (BMI) OF 45.0 TO 49.9 IN ADULT: ICD-10-CM

## 2022-06-29 DIAGNOSIS — E11.621 DIABETIC ULCER OF LEFT HEEL ASSOCIATED WITH TYPE 2 DIABETES MELLITUS, WITH NECROSIS OF BONE: ICD-10-CM

## 2022-06-29 DIAGNOSIS — Z60.9 HIGH RISK SOCIAL SITUATION: ICD-10-CM

## 2022-06-29 DIAGNOSIS — T81.31XD POSTOPERATIVE WOUND DEHISCENCE, SUBSEQUENT ENCOUNTER: Primary | ICD-10-CM

## 2022-06-29 DIAGNOSIS — L97.424 DIABETIC ULCER OF LEFT HEEL ASSOCIATED WITH TYPE 2 DIABETES MELLITUS, WITH NECROSIS OF BONE: ICD-10-CM

## 2022-06-29 PROCEDURE — 97597 DBRDMT OPN WND 1ST 20 CM/<: CPT | Performed by: EMERGENCY MEDICINE

## 2022-06-29 PROCEDURE — 11042 DBRDMT SUBQ TIS 1ST 20SQCM/<: CPT | Performed by: EMERGENCY MEDICINE

## 2022-06-29 SDOH — SOCIAL STABILITY - SOCIAL INSECURITY: PROBLEM RELATED TO SOCIAL ENVIRONMENT, UNSPECIFIED: Z60.9

## 2022-06-29 NOTE — PROGRESS NOTES
Chief Complaint  Wound Check (Diabetic ulcer left and right foot(blood sugar 144 this am))    Subjective      Wound Check        Jose Shaikh  is a 61 y.o. diabetic male with a right MTT amputation that is healing by secondary intention.  He also has a wound on the left plantar foot.  He has a difficult social situation due to financial constraints and housing issues.      He had a nurse from his [employer] coming to do his dressing changes but he says it is gotten to the point where he is able to do them himself now.  He is applying Aquacel Ag to the left plantar foot wound and putting Padmini on the right foot as well as Betadine along the edges of the wound.  He mostly uses a wheelchair to get around and tries to stay off his feet is much as possible.  He thinks the wounds are getting better but he is not sure.      Allergies:  Adhesive tape      Current Outpatient Medications:   •  alfuzosin (UROXATRAL) 10 MG 24 hr tablet, Take 10 mg by mouth Daily., Disp: , Rfl:   •  apixaban (ELIQUIS) 5 MG tablet tablet, Take 5 mg by mouth 2 (two) times a day., Disp: , Rfl:   •  buPROPion XL (WELLBUTRIN XL) 300 MG 24 hr tablet, Take 300 mg by mouth Every Morning., Disp: , Rfl:   •  citalopram (CeleXA) 10 MG tablet, Take 10 mg by mouth Every Night., Disp: , Rfl:   •  glucose blood test strip, Test blood sugar 3 times a day, Disp: 100 each, Rfl: 5  •  HYDROcodone-acetaminophen (Norco) 5-325 MG per tablet, Take 1 tablet by mouth Every 4 (Four) Hours As Needed for Moderate Pain  for up to 18 doses., Disp: 18 tablet, Rfl: 0  •  insulin aspart (NovoLOG) 100 UNIT/ML injection, Inject 20 Units under the skin into the appropriate area as directed 3 (Three) Times a Day Before Meals. Take up to 20 units as instructed, Disp: 20 mL, Rfl: 3  •  insulin detemir (LEVEMIR) 100 UNIT/ML injection, Inject 50 Units under the skin into the appropriate area as directed Every Night. Take 40 units and adjust to 50 as instructed, Disp: 20 mL, Rfl: 3  •   lisinopril (PRINIVIL,ZESTRIL) 20 MG tablet, Take 20 mg by mouth Daily., Disp: , Rfl:   •  metFORMIN (GLUCOPHAGE) 1000 MG tablet, Take 1,000 mg by mouth 2 (two) times a day., Disp: , Rfl:   •  metoprolol succinate XL (TOPROL-XL) 50 MG 24 hr tablet, Take 50 mg by mouth Daily., Disp: , Rfl:   •  pregabalin (LYRICA) 25 MG capsule, Take 1 capsule by mouth Every 12 (Twelve) Hours for 15 days., Disp: 30 capsule, Rfl: 0  •  sertraline (ZOLOFT) 100 MG tablet, Take 100 mg by mouth Daily., Disp: , Rfl:   •  silver sulfadiazine (Silvadene) 1 % cream, Apply 1 application topically to the appropriate area as directed Daily., Disp: 50 g, Rfl: 1  •  simvastatin (ZOCOR) 20 MG tablet, Take 20 mg by mouth Every Night., Disp: , Rfl:   •  sodium hypochlorite (DAKIN'S 1/4 STRENGTH) 0.125 % solution topical solution 0.125%, Apply 10 mL topically to the appropriate area as directed 2 (Two) Times a Day., Disp: 1000 mL, Rfl: 1  •  Trulicity 1.5 MG/0.5ML solution pen-injector, INJET ONE SYRINGE INTO THE SKIN WEEKLY, Disp: 2 mL, Rfl: 2    Past Medical History:   Diagnosis Date   • Absence of toe of right foot    • Acute osteomyelitis of left calcaneus  8/18/2021   • Anxiety and depression    • Arthritis    • Claustrophobia    • Corns and callus    • Diabetic ulcer of left heel associated with type 2 DM 8/18/2021   • Diabetic ulcer of left heel associated with type 2 DM 7/6/2021   • Diabetic ulcer of right midfoot associated with type 2 DM 8/18/2021   • Difficulty walking    • Essential hypertension 8/31/2021   • Hammertoe    • Hyperlipidemia LDL goal <100 8/31/2021   • Ingrown toenail    • Obesity    • Paroxysmal atrial fibrillation 8/31/2021   • Polyneuropathy    • Pressure ulcer, stage 1    • Tinea unguium    • Type 2 diabetes mellitus with polyneuropathy      Past Surgical History:   Procedure Laterality Date   • CYST REMOVAL      center of back; benign   • INCISION AND DRAINAGE ABSCESS      back   • INCISION AND DRAINAGE LEG Right  12/10/2021    Procedure: INCISION AND DRAINAGE LOWER EXTREMITY;  Surgeon: Ash Leyva DPM;  Location: [facility] MAIN OR;  Service: Podiatry;  Laterality: Right;   • OTHER SURGICAL HISTORY      Surgical clips left foot   • TOE SURGERY Right     Removal of 5th toe   • TRANS METATARSAL AMPUTATION Right 2021    Procedure: AMPUTATION TRANS METATARSAL;  Surgeon: Ash Leyva DPM;  Location: [facility] MAIN OR;  Service: Podiatry;  Laterality: Right;   • WRIST SURGERY Left     repair of injury     Social History     Socioeconomic History   • Marital status: Single   Tobacco Use   • Smoking status: Former Smoker     Packs/day: 0.00     Years: 1.00     Pack years: 0.00     Quit date: 1991     Years since quittin.8   • Smokeless tobacco: Never Used   • Tobacco comment: quit at age 30   Vaping Use   • Vaping Use: Never used   Substance and Sexual Activity   • Alcohol use: Yes     Comment: Rare   • Drug use: Yes     Types: Marijuana     Comment: Daily   • Sexual activity: Defer           Objective     Vitals:    22 1430   BP: 120/68   BP Location: Left arm   Patient Position: Sitting   Cuff Size: Adult   Pulse: 74   Resp: 16   Temp: 97 °F (36.1 °C)   TempSrc: Temporal   PainSc: 0-No pain     There is no height or weight on file to calculate BMI.    STEADI Fall Risk Assessment has not been completed.     Review of Systems     ROS:  No fevers, chills, sweats, or body aches.     I have reviewed the HPI and ROS as documented by MA/RN. Shea Daniels MD    Physical Exam     NAD  Normocephalic, atraumatic  EOMI, no scleral icterus  No respiratory distress, no cough  AAOx3, pleasant, cooperative  Left plantar wound is smaller and getting more shallow but still exhibits significant recurrent callus and deep hemosiderin deposition indicating pressure injury.  TTP with deep palpation laterally but no pain with sharp debridement.  No evidence of infection.  Right foot is showing significant  improvement in size and tissue health.  No evidence of infection.  Increased epithelialization around the edges and dramatically decreased necrosis and pressure injury.  There are still a few areas of deep pressure injury and devitalized tissues but overall the wound is much improved.  There is also a significant decrease in the amount of maceration and callus formation.    See photos for details.    RLE:        LLE:          Result Review :  The following data was reviewed by: Shea Daniels MD on 06/29/2022:    Prior notes and images.    Wound debridement  Performed by: Shea Daniels MD  Authorized by: Shea Daniels MD     Correct patient: Identification verified by two methods:     Verbally and Date of birth  Correct procedure/consent    Correct site/side    Correct equipment as applicable    How many wounds are you performing a debridement on?:  2  Wound 1:     Nursing Documentation:     Wound Location:  Right foot  Measurements:     The total amount debrided on this wound was under 20 cm2.     Provider Documentation    Debridement type:  Sharp/excisional    Betadine      Other:  Sterile 10 blade     None    Tissue debrided:  Moderate    Level removed:  Subcutaneous    Patient tolerance:  Good    Hemostasis achieved by:  Silver nitrate    Wound Bed Post Debridement: See Photo    Wound 2:     Wound Location:  Left foot   Measurements:    The total amount debrided on this wound was under 20 cm2.      Provider Documentation:     Debridement type:  Sharp/Excisional    Betadine      Other:  Sterile 10 blade     None    Tissue debrided:  Moderate    Level removed:  Skin    Patient tolerance:  Good    Hemostasis achieved by:  Silver Nitrate    Wound Bed Post Debridement: See Photo                       Assessment and Plan   Diagnoses and all orders for this visit:    1. Postoperative wound dehiscence, subsequent encounter (Primary)  -     Wound debridement  -     silver sulfadiazine (Silvadene) 1 % cream; Apply  1 application topically to the appropriate area as directed Daily.  Dispense: 50 g; Refill: 1    2. Amputation of right midfoot (HCC)    3. Diabetic ulcer of left heel associated with type 2 diabetes mellitus, with necrosis of bone (HCC)  -     Wound debridement    4. Diabetic polyneuropathy associated with type 2 diabetes mellitus (HCC)    5. Class 3 severe obesity due to excess calories with serious comorbidity and body mass index (BMI) of 45.0 to 49.9 in adult ([facility])    6. High risk social situation        Wounds are improving significantly.    Silvadene to lateral right foot incision line.  This area is somewhat firm and there are few small splits but overall it is healed.  He can stop the Betadine for now but can go back to using it if any maceration of the periwound tissues return.  He is instructed on what to look for.      Padmini Ag to right foot wound.    Patient has been using Aquacel Ag to the left foot and would like to continue doing that because he has difficulty packing the Padmini into the wound.  It is improving so he can continue using the Aquacel.    Offload distal right foot at all times!    Recheck 4 weeks, sooner if problems arise.    Patient was given instructions and counseling regarding their condition or for health maintenance advice, as well as the wound care plan and recommendations. They understand and agree with the plan.  They will follow back up here in the clinic but are instructed to contact us in the interim should they have any new, returning, or worsening symptoms or concerns. Please see specific information pulled into the AVS if appropriate.     Dragon Dictation utilized for chart completion.    Follow Up   Return in about 4 weeks (around 7/27/2022) for Recheck.      Shea Daniels MD

## 2022-06-29 NOTE — SIGNIFICANT NOTE
Epithelial %: 25-50%    Exposed Bone: no    Exposed Tendon: no    Impression: improved    Short Term Goals: INCREASE GRANULATION AND DECREASE SIZE

## 2022-06-29 NOTE — SIGNIFICANT NOTE
Epithelial %: 25-50%    Exposed Bone: no    Exposed Tendon: no    Impression: unchanged    Short Term Goals: INCREASE GRANULATION AND DECREASE SIZE

## 2022-07-28 ENCOUNTER — OFFICE VISIT (OUTPATIENT)
Dept: WOUND CARE | Facility: HOSPITAL | Age: 62
End: 2022-07-28

## 2022-07-28 VITALS
SYSTOLIC BLOOD PRESSURE: 142 MMHG | TEMPERATURE: 97.8 F | HEART RATE: 82 BPM | RESPIRATION RATE: 18 BRPM | DIASTOLIC BLOOD PRESSURE: 78 MMHG

## 2022-07-28 DIAGNOSIS — L97.424 DIABETIC ULCER OF LEFT HEEL ASSOCIATED WITH TYPE 2 DIABETES MELLITUS, WITH NECROSIS OF BONE: ICD-10-CM

## 2022-07-28 DIAGNOSIS — S98.311A: ICD-10-CM

## 2022-07-28 DIAGNOSIS — T81.31XD POSTOPERATIVE WOUND DEHISCENCE, SUBSEQUENT ENCOUNTER: Primary | ICD-10-CM

## 2022-07-28 DIAGNOSIS — L03.115 CELLULITIS OF RIGHT FOOT: ICD-10-CM

## 2022-07-28 DIAGNOSIS — E11.621 DIABETIC ULCER OF LEFT HEEL ASSOCIATED WITH TYPE 2 DIABETES MELLITUS, WITH NECROSIS OF BONE: ICD-10-CM

## 2022-07-28 DIAGNOSIS — E66.9 OBESITY WITH SERIOUS COMORBIDITY, UNSPECIFIED CLASSIFICATION, UNSPECIFIED OBESITY TYPE: ICD-10-CM

## 2022-07-28 DIAGNOSIS — E11.42 DIABETIC POLYNEUROPATHY ASSOCIATED WITH TYPE 2 DIABETES MELLITUS: ICD-10-CM

## 2022-07-28 PROCEDURE — 11042 DBRDMT SUBQ TIS 1ST 20SQCM/<: CPT | Performed by: EMERGENCY MEDICINE

## 2022-07-28 PROCEDURE — 97597 DBRDMT OPN WND 1ST 20 CM/<: CPT | Performed by: EMERGENCY MEDICINE

## 2022-07-28 NOTE — PROGRESS NOTES
Chief Complaint  Wound Check (Wound check to RLE)    Subjective      Wound Check        Jose Shaikh  is a 61 y.o. diabetic male with a right MTT amputation that is healing by secondary intention.  He also has a wound on the left plantar foot.  He has a difficult social situation due to financial constraints and housing issues.      He had a nurse from his [employer] coming to do his dressing changes but he says it is gotten to the point where he is able to do them himself now.  He is applying Padmini Ag to both plantar wounds.  He mostly uses a wheelchair to get around and tries to stay off his feet is much as possible. He does have to walk on it sometimes because his wheelchair doesn't fit in the bathroom.     Allergies:  Adhesive tape      Current Outpatient Medications:   •  alfuzosin (UROXATRAL) 10 MG 24 hr tablet, Take 10 mg by mouth Daily., Disp: , Rfl:   •  apixaban (ELIQUIS) 5 MG tablet tablet, Take 5 mg by mouth 2 (two) times a day., Disp: , Rfl:   •  buPROPion XL (WELLBUTRIN XL) 300 MG 24 hr tablet, Take 300 mg by mouth Every Morning., Disp: , Rfl:   •  citalopram (CeleXA) 10 MG tablet, Take 10 mg by mouth Every Night., Disp: , Rfl:   •  doxycycline (VIBRAMYCIN) 100 MG capsule, Take 1 capsule by mouth 2 (Two) Times a Day for 10 days., Disp: 20 capsule, Rfl: 0  •  glucose blood test strip, Test blood sugar 3 times a day, Disp: 100 each, Rfl: 5  •  HYDROcodone-acetaminophen (Norco) 5-325 MG per tablet, Take 1 tablet by mouth Every 4 (Four) Hours As Needed for Moderate Pain  for up to 18 doses., Disp: 18 tablet, Rfl: 0  •  insulin aspart (NovoLOG) 100 UNIT/ML injection, Inject 20 Units under the skin into the appropriate area as directed 3 (Three) Times a Day Before Meals. Take up to 20 units as instructed, Disp: 20 mL, Rfl: 3  •  insulin detemir (LEVEMIR) 100 UNIT/ML injection, Inject 50 Units under the skin into the appropriate area as directed Every Night. Take 40 units and adjust to 50 as instructed, Disp:  20 mL, Rfl: 3  •  lisinopril (PRINIVIL,ZESTRIL) 20 MG tablet, Take 20 mg by mouth Daily., Disp: , Rfl:   •  metFORMIN (GLUCOPHAGE) 1000 MG tablet, Take 1,000 mg by mouth 2 (two) times a day., Disp: , Rfl:   •  metoprolol succinate XL (TOPROL-XL) 50 MG 24 hr tablet, Take 50 mg by mouth Daily., Disp: , Rfl:   •  pregabalin (LYRICA) 25 MG capsule, Take 1 capsule by mouth Every 12 (Twelve) Hours for 15 days., Disp: 30 capsule, Rfl: 0  •  sertraline (ZOLOFT) 100 MG tablet, Take 100 mg by mouth Daily., Disp: , Rfl:   •  silver sulfadiazine (Silvadene) 1 % cream, Apply 1 application topically to the appropriate area as directed Daily., Disp: 50 g, Rfl: 1  •  simvastatin (ZOCOR) 20 MG tablet, Take 20 mg by mouth Every Night., Disp: , Rfl:   •  sodium hypochlorite (DAKIN'S 1/4 STRENGTH) 0.125 % solution topical solution 0.125%, Apply 10 mL topically to the appropriate area as directed 2 (Two) Times a Day., Disp: 1000 mL, Rfl: 1  •  Trulicity 1.5 MG/0.5ML solution pen-injector, INJET ONE SYRINGE INTO THE SKIN WEEKLY, Disp: 2 mL, Rfl: 2    Past Medical History:   Diagnosis Date   • Absence of toe of right foot    • Acute osteomyelitis of left calcaneus  8/18/2021   • Anxiety and depression    • Arthritis    • Claustrophobia    • Corns and callus    • Diabetic ulcer of left heel associated with type 2 DM 8/18/2021   • Diabetic ulcer of left heel associated with type 2 DM 7/6/2021   • Diabetic ulcer of right midfoot associated with type 2 DM 8/18/2021   • Difficulty walking    • Essential hypertension 8/31/2021   • Hammertoe    • Hyperlipidemia LDL goal <100 8/31/2021   • Ingrown toenail    • Obesity    • Paroxysmal atrial fibrillation 8/31/2021   • Polyneuropathy    • Pressure ulcer, stage 1    • Tinea unguium    • Type 2 diabetes mellitus with polyneuropathy      Past Surgical History:   Procedure Laterality Date   • CYST REMOVAL      center of back; benign   • INCISION AND DRAINAGE ABSCESS      back   • INCISION AND DRAINAGE  LEG Right 12/10/2021    Procedure: INCISION AND DRAINAGE LOWER EXTREMITY;  Surgeon: Ash Leyva DPM;  Location: [facility] MAIN OR;  Service: Podiatry;  Laterality: Right;   • OTHER SURGICAL HISTORY      Surgical clips left foot   • TOE SURGERY Right     Removal of 5th toe   • TRANS METATARSAL AMPUTATION Right 2021    Procedure: AMPUTATION TRANS METATARSAL;  Surgeon: Ash Leyva DPM;  Location: [facility] MAIN OR;  Service: Podiatry;  Laterality: Right;   • WRIST SURGERY Left     repair of injury     Social History     Socioeconomic History   • Marital status: Single   Tobacco Use   • Smoking status: Former Smoker     Packs/day: 0.00     Years: 1.00     Pack years: 0.00     Quit date: 1991     Years since quittin.9   • Smokeless tobacco: Never Used   • Tobacco comment: quit at age 30   Vaping Use   • Vaping Use: Never used   Substance and Sexual Activity   • Alcohol use: Yes     Comment: Rare   • Drug use: Yes     Types: Marijuana     Comment: Daily   • Sexual activity: Defer           Objective     Vitals:    22 1421   BP: 142/78   BP Location: Left arm   Patient Position: Sitting   Pulse: 82   Resp: 18   Temp: 97.8 °F (36.6 °C)   TempSrc: Temporal   PainSc:   7   PainLoc: Foot     There is no height or weight on file to calculate BMI.    STEADI Fall Risk Assessment has not been completed.     Review of Systems     ROS:  No fevers, chills, sweats, or body aches.     I have reviewed the HPI and ROS as documented by MA/RN. Shea Daniels MD    Physical Exam     NAD  Normocephalic, atraumatic  EOMI, no scleral icterus  No respiratory distress, no cough  AAOx3, pleasant, cooperative  Left plantar wound still exhibits significant recurrent callus and deep hemosiderin deposition indicating pressure injury.  TTP with deep palpation laterally but no pain with sharp debridement.  No evidence of infection. Wound appears to be getting smaller.   Right foot is similar are slightly worse  again.  Mild erythema and warmth along the dorsal aspect of the wound/foor but no drainage. There is increased deep pressure injury and devitalized tissues but overall the wound is fairly stable.  There is also a significant decrease in the amount of maceration and callus formation along the healed edges of the wound.    See photos for details.    RLE:            LLE:              Result Review :  The following data was reviewed by: Shea Daniels MD on 07/28/2022:    Prior notes and images.    Wound debridement  Performed by: Shea Daniels MD  Authorized by: Shea Daniels MD     Correct patient: Identification verified by two methods:     Verbally and Date of birth  Correct procedure/consent    Correct site/side    Correct equipment as applicable    How many wounds are you performing a debridement on?:  2  Wound 1:     Nursing Documentation:     Wound Location:  Right TMA  Measurements:     The total amount debrided on this wound was under 20 cm2.     Provider Documentation    Debridement type:  Sharp/excisional    Betadine      Other:  Sterile pickups and 10 blade     None    Tissue debrided:  Moderate    Level removed:  Subcutaneous    Patient tolerance:  Good    Hemostasis achieved by:  Silver nitrate    Wound Bed Post Debridement: See Photo    Wound 2:     Wound Location:  Left plantar foot   Measurements:    The total amount debrided on this wound was under 20 cm2.      Provider Documentation:     Debridement type:  Sharp/Excisional    Betadine      Other:  Sterile pickups and 10 blade     None    Tissue debrided:  Large    Level removed:  Skin    Patient tolerance:  Good    Hemostasis achieved by:  Silver Nitrate    Wound Bed Post Debridement: See Photo                       Assessment and Plan   Diagnoses and all orders for this visit:    1. Postoperative wound dehiscence, subsequent encounter (Primary)  -     Wound debridement    2. Amputation of right midfoot (HCC)    3. Diabetic ulcer of left heel  associated with type 2 diabetes mellitus, with necrosis of bone (HCC)  -     Wound debridement    4. Diabetic polyneuropathy associated with type 2 diabetes mellitus (HCC)    5. Obesity with serious comorbidity, unspecified classification, unspecified obesity type    6. Cellulitis of right foot  -     doxycycline (VIBRAMYCIN) 100 MG capsule; Take 1 capsule by mouth 2 (Two) Times a Day for 10 days.  Dispense: 20 capsule; Refill: 0        Wounds are stable or slightly proved.    Padmini Ag packed into bilateral foot wounds. Doxycycline for cellulitis.     Offload distal right foot at all times!  This is very challenging for him to do as    Recheck 4 weeks, sooner if problems arise.    Patient was given instructions and counseling regarding their condition or for health maintenance advice, as well as the wound care plan and recommendations. They understand and agree with the plan.  They will follow back up here in the clinic but are instructed to contact us in the interim should they have any new, returning, or worsening symptoms or concerns. Please see specific information pulled into the AVS if appropriate.     Dragon Dictation utilized for chart completion.    Follow Up   Return in about 4 weeks (around 8/25/2022) for Recheck.      Shea Daniels MD

## 2022-07-29 RX ORDER — DOXYCYCLINE HYCLATE 100 MG/1
100 CAPSULE ORAL 2 TIMES DAILY
Qty: 20 CAPSULE | Refills: 0 | Status: SHIPPED | OUTPATIENT
Start: 2022-07-29 | End: 2022-08-08

## 2022-08-04 ENCOUNTER — TELEPHONE (OUTPATIENT)
Dept: CARDIOLOGY | Facility: CLINIC | Age: 62
End: 2022-08-04

## 2022-08-25 ENCOUNTER — OFFICE VISIT (OUTPATIENT)
Dept: WOUND CARE | Facility: HOSPITAL | Age: 62
End: 2022-08-25

## 2022-08-25 VITALS
DIASTOLIC BLOOD PRESSURE: 64 MMHG | SYSTOLIC BLOOD PRESSURE: 102 MMHG | RESPIRATION RATE: 18 BRPM | HEART RATE: 75 BPM | TEMPERATURE: 97 F

## 2022-08-25 DIAGNOSIS — L97.424 DIABETIC ULCER OF LEFT HEEL ASSOCIATED WITH TYPE 2 DIABETES MELLITUS, WITH NECROSIS OF BONE: Primary | ICD-10-CM

## 2022-08-25 DIAGNOSIS — E11.621 DIABETIC ULCER OF LEFT HEEL ASSOCIATED WITH TYPE 2 DIABETES MELLITUS, WITH NECROSIS OF BONE: Primary | ICD-10-CM

## 2022-08-25 DIAGNOSIS — E66.01 CLASS 3 SEVERE OBESITY DUE TO EXCESS CALORIES WITH SERIOUS COMORBIDITY AND BODY MASS INDEX (BMI) OF 45.0 TO 49.9 IN ADULT: ICD-10-CM

## 2022-08-25 DIAGNOSIS — T81.31XD POSTOPERATIVE WOUND DEHISCENCE, SUBSEQUENT ENCOUNTER: ICD-10-CM

## 2022-08-25 DIAGNOSIS — E11.42 DIABETIC POLYNEUROPATHY ASSOCIATED WITH TYPE 2 DIABETES MELLITUS: ICD-10-CM

## 2022-08-25 DIAGNOSIS — S98.311A: ICD-10-CM

## 2022-08-25 PROCEDURE — 11042 DBRDMT SUBQ TIS 1ST 20SQCM/<: CPT | Performed by: EMERGENCY MEDICINE

## 2022-08-25 PROCEDURE — 97597 DBRDMT OPN WND 1ST 20 CM/<: CPT | Performed by: EMERGENCY MEDICINE

## 2022-08-25 RX ORDER — DULAGLUTIDE 1.5 MG/.5ML
INJECTION, SOLUTION SUBCUTANEOUS
Qty: 6 ML | Refills: 2 | Status: SHIPPED | OUTPATIENT
Start: 2022-08-25 | End: 2022-12-22

## 2022-08-25 NOTE — PROGRESS NOTES
"Chief Complaint  Wound Check (BILATERAL FOOT ULCERS )    Subjective      Wound Check        Jose Shaikh  is a 61 y.o. diabetic male with a right MTT amputation that is healing by secondary intention.  He also has a wound on the left plantar foot.  He has a difficult social situation due to financial constraints and housing issues.      He had a nurse from his [employer] coming to do his dressing changes but he is doing them himself now.      He is applying Betadine around the edges and then Padmini Ag to the right foot wound.  He packs the left plantar foot wound with Aquacel Ag.  He does this usually every other day.  He says \"I am too lazy to do it every day.\"  He mostly uses a wheelchair to get around and tries to stay off his feet is much as possible. He does have to walk on it sometimes because his wheelchair and walker do not fit in the bathroom.     Allergies:  Adhesive tape      Current Outpatient Medications:   •  alfuzosin (UROXATRAL) 10 MG 24 hr tablet, Take 10 mg by mouth Daily., Disp: , Rfl:   •  apixaban (ELIQUIS) 5 MG tablet tablet, Take 5 mg by mouth 2 (two) times a day., Disp: , Rfl:   •  buPROPion XL (WELLBUTRIN XL) 300 MG 24 hr tablet, Take 300 mg by mouth Every Morning., Disp: , Rfl:   •  citalopram (CeleXA) 10 MG tablet, Take 10 mg by mouth Every Night., Disp: , Rfl:   •  glucose blood test strip, Test blood sugar 3 times a day, Disp: 100 each, Rfl: 5  •  HYDROcodone-acetaminophen (Norco) 5-325 MG per tablet, Take 1 tablet by mouth Every 4 (Four) Hours As Needed for Moderate Pain  for up to 18 doses., Disp: 18 tablet, Rfl: 0  •  insulin aspart (NovoLOG) 100 UNIT/ML injection, Inject 20 Units under the skin into the appropriate area as directed 3 (Three) Times a Day Before Meals. Take up to 20 units as instructed, Disp: 20 mL, Rfl: 3  •  insulin detemir (LEVEMIR) 100 UNIT/ML injection, Inject 50 Units under the skin into the appropriate area as directed Every Night. Take 40 units and adjust to 50 as " instructed, Disp: 20 mL, Rfl: 3  •  lisinopril (PRINIVIL,ZESTRIL) 20 MG tablet, Take 20 mg by mouth Daily., Disp: , Rfl:   •  metFORMIN (GLUCOPHAGE) 1000 MG tablet, Take 1,000 mg by mouth 2 (two) times a day., Disp: , Rfl:   •  metoprolol succinate XL (TOPROL-XL) 50 MG 24 hr tablet, Take 50 mg by mouth Daily., Disp: , Rfl:   •  pregabalin (LYRICA) 25 MG capsule, Take 1 capsule by mouth Every 12 (Twelve) Hours for 15 days., Disp: 30 capsule, Rfl: 0  •  sertraline (ZOLOFT) 100 MG tablet, Take 100 mg by mouth Daily., Disp: , Rfl:   •  silver sulfadiazine (Silvadene) 1 % cream, Apply 1 application topically to the appropriate area as directed Daily., Disp: 50 g, Rfl: 1  •  simvastatin (ZOCOR) 20 MG tablet, Take 20 mg by mouth Every Night., Disp: , Rfl:   •  sodium hypochlorite (DAKIN'S 1/4 STRENGTH) 0.125 % solution topical solution 0.125%, Apply 10 mL topically to the appropriate area as directed 2 (Two) Times a Day., Disp: 1000 mL, Rfl: 1  •  Trulicity 1.5 MG/0.5ML solution pen-injector, INJECT ONE SYRINGE UNDER THE SKIN ONCE WEEKLY **APPOINTMENT NEEDED FOR MORE REFILLS**, Disp: 6 mL, Rfl: 2    Past Medical History:   Diagnosis Date   • Absence of toe of right foot    • Acute osteomyelitis of left calcaneus  8/18/2021   • Anxiety and depression    • Arthritis    • Claustrophobia    • Corns and callus    • Diabetic ulcer of left heel associated with type 2 DM 8/18/2021   • Diabetic ulcer of left heel associated with type 2 DM 7/6/2021   • Diabetic ulcer of right midfoot associated with type 2 DM 8/18/2021   • Difficulty walking    • Essential hypertension 8/31/2021   • Hammertoe    • Hyperlipidemia LDL goal <100 8/31/2021   • Ingrown toenail    • Obesity    • Paroxysmal atrial fibrillation 8/31/2021   • Polyneuropathy    • Pressure ulcer, stage 1    • Tinea unguium    • Type 2 diabetes mellitus with polyneuropathy      Past Surgical History:   Procedure Laterality Date   • CYST REMOVAL      center of back; benign   •  INCISION AND DRAINAGE ABSCESS      back   • INCISION AND DRAINAGE LEG Right 12/10/2021    Procedure: INCISION AND DRAINAGE LOWER EXTREMITY;  Surgeon: Ash Leyva DPM;  Location: [facility];  Service: Podiatry;  Laterality: Right;   • OTHER SURGICAL HISTORY      Surgical clips left foot   • TOE SURGERY Right     Removal of 5th toe   • TRANS METATARSAL AMPUTATION Right 2021    Procedure: AMPUTATION TRANS METATARSAL;  Surgeon: Ash Leyva DPM;  Location: [facility];  Service: Podiatry;  Laterality: Right;   • WRIST SURGERY Left     repair of injury     Social History     Socioeconomic History   • Marital status: Single   Tobacco Use   • Smoking status: Former Smoker     Packs/day: 0.00     Years: 1.00     Pack years: 0.00     Quit date: 1991     Years since quittin.0   • Smokeless tobacco: Never Used   • Tobacco comment: quit at age 30   Vaping Use   • Vaping Use: Never used   Substance and Sexual Activity   • Alcohol use: Yes     Comment: Rare   • Drug use: Yes     Types: Marijuana     Comment: Daily   • Sexual activity: Defer           Objective     Vitals:    22 1416   BP: 102/64   BP Location: Left arm   Patient Position: Sitting   Pulse: 75   Resp: 18   Temp: 97 °F (36.1 °C)   TempSrc: Temporal   PainSc: 0-No pain     There is no height or weight on file to calculate BMI.     Height 71 inches, weight 340 pounds, BMI 47.4    STEADI Fall Risk Assessment has not been completed.     Review of Systems     ROS:  No fevers, chills, sweats, or body aches.     I have reviewed the HPI and ROS as documented by MA/RN. Shea Daniels MD    Physical Exam     NAD  Normocephalic, atraumatic  EOMI, no scleral icterus  No respiratory distress, no cough  AAOx3, pleasant, cooperative  Left plantar wound with severe recurrent callus and deep hemosiderin deposition, even more than normal.  TTP with deep palpation laterally but no pain with sharp debridement.  No evidence of  infection. Wound appears about the same or very slightly smaller.    Right foot is similar or slightly worse again.  Tissue appearance is fairly stable.  There continues to be pressure injury of the central tissues of the wound and mild to moderate periwound maceration no TTP.  There is decreased maceration and callus formation along the healed edges of the wound.    See photos for details.    RLE:              LLE:                  Result Review :  The following data was reviewed by: Shea Daniels MD on 08/25/2022:    Prior notes and images.    Wound debridement  Performed by: Shea Daniels MD  Authorized by: Shea Daniels MD     Correct patient: Identification verified by two methods:     Date of birth and Verbally  Correct procedure/consent    Correct site/side    Correct equipment as applicable    How many wounds are you performing a debridement on?:  2  Wound 1:     Nursing Documentation:     Wound Location:  Right foot  Measurements:     The total amount debrided on this wound was under 20 cm2.     Provider Documentation    Debridement type:  Sharp/excisional    Betadine      Other:  Sterile 10 blade and pickups     None    Tissue debrided:  Moderate    Level removed:  Subcutaneous    Patient tolerance:  Good    Hemostasis achieved by:  Silver nitrate and Direct pressure    Wound Bed Post Debridement: See Photo    Wound 2:     Wound Location:  Left foot   Measurements:    The total amount debrided on this wound was under 20 cm2.      Provider Documentation:     Debridement type:  Sharp/Excisional    Betadine      Other:  Sterile 10 blade and pickups     None    Tissue debrided:  Large    Level removed:  Skin    Patient tolerance:  Good    Hemostasis achieved by:  Silver Nitrate and Direct Pressure    Wound Bed Post Debridement: See Photo                       Assessment and Plan   Diagnoses and all orders for this visit:    1. Diabetic ulcer of left heel associated with type 2 diabetes mellitus, with  necrosis of bone (HCC) (Primary)    2. Postoperative wound dehiscence, subsequent encounter    3. Amputation of right midfoot (HCC)    4. Diabetic polyneuropathy associated with type 2 diabetes mellitus (HCC)    5. Class 3 severe obesity due to excess calories with serious comorbidity and body mass index (BMI) of 45.0 to 49.9 in adult (HCC)    Other orders  -     Wound debridement        Wounds are stable or slightly proved.    Padmini Ag applied to right foot wound.  He should continue painting around the wound with Betadine.  He has a lot of Aquacel Ag at home and would like to put that over the wound.  If he would prefer to do that he can apply Padmini to the base of the wound and Aquacel Ag over it to help with moisture management.  Unfortunately, it would be better if he could do the dressing changes daily but this is quite difficult for him.    Padmini Ag packed into the undermined area of the left plantar foot wound.  This should be done daily or every other day depending on absorption.    Offload distal right foot at all times!  This is very challenging for him to do but is very important in determining how well he heals.    Work on improving diabetic control and blood sugar levels.    Recheck 4 weeks, sooner if problems arise.    Patient was given instructions and counseling regarding their condition or for health maintenance advice, as well as the wound care plan and recommendations. They understand and agree with the plan.  They will follow back up here in the clinic but are instructed to contact us in the interim should they have any new, returning, or worsening symptoms or concerns. Please see specific information pulled into the AVS if appropriate.     Dragon Dictation utilized for chart completion.    Follow Up   Return in about 4 weeks (around 9/22/2022) for Recheck.      Shea Daniels MD

## 2022-09-07 RX ORDER — INSULIN DETEMIR 100 [IU]/ML
INJECTION, SOLUTION SUBCUTANEOUS
Qty: 20 ML | Refills: 3 | OUTPATIENT
Start: 2022-09-07

## 2022-09-21 ENCOUNTER — OFFICE VISIT (OUTPATIENT)
Dept: WOUND CARE | Facility: HOSPITAL | Age: 62
End: 2022-09-21

## 2022-09-21 VITALS
DIASTOLIC BLOOD PRESSURE: 86 MMHG | HEART RATE: 92 BPM | TEMPERATURE: 97.4 F | RESPIRATION RATE: 18 BRPM | SYSTOLIC BLOOD PRESSURE: 146 MMHG

## 2022-09-21 DIAGNOSIS — T81.31XD POSTOPERATIVE WOUND DEHISCENCE, SUBSEQUENT ENCOUNTER: ICD-10-CM

## 2022-09-21 DIAGNOSIS — L97.424 DIABETIC ULCER OF LEFT HEEL ASSOCIATED WITH TYPE 2 DIABETES MELLITUS, WITH NECROSIS OF BONE: Primary | ICD-10-CM

## 2022-09-21 DIAGNOSIS — E11.42 DIABETIC POLYNEUROPATHY ASSOCIATED WITH TYPE 2 DIABETES MELLITUS: ICD-10-CM

## 2022-09-21 DIAGNOSIS — S98.311A: ICD-10-CM

## 2022-09-21 DIAGNOSIS — E11.621 DIABETIC ULCER OF LEFT HEEL ASSOCIATED WITH TYPE 2 DIABETES MELLITUS, WITH NECROSIS OF BONE: Primary | ICD-10-CM

## 2022-09-21 DIAGNOSIS — E66.9 OBESITY WITH SERIOUS COMORBIDITY, UNSPECIFIED CLASSIFICATION, UNSPECIFIED OBESITY TYPE: ICD-10-CM

## 2022-09-21 PROCEDURE — 11042 DBRDMT SUBQ TIS 1ST 20SQCM/<: CPT | Performed by: EMERGENCY MEDICINE

## 2022-09-21 PROCEDURE — 97597 DBRDMT OPN WND 1ST 20 CM/<: CPT | Performed by: EMERGENCY MEDICINE

## 2022-09-22 ENCOUNTER — PATIENT ROUNDING (BHMG ONLY) (OUTPATIENT)
Dept: WOUND CARE | Facility: HOSPITAL | Age: 62
End: 2022-09-22

## 2022-09-22 NOTE — PROGRESS NOTES
"Chief Complaint  Wound Check (WOUND CHECK TO BILAT FOOT ())    Subjective      Wound Check        Jose Shaikh  is a 61 y.o. diabetic male with a right MTT amputation that is healing by secondary intention.  He also has a wound on the left plantar foot.  He has a difficult social situation due to financial constraints and housing issues.      He had a nurse from his [employer] coming to do his dressing changes but he is doing them himself now.      He is applying Betadine around the edges and then Padmini Ag to the right foot wound.  He packs the left plantar foot wound with Aquacel Ag.  He does this usually every other day.  He says \"I am too lazy to do it every day.\"  He mostly uses a wheelchair to get around and tries to stay off his feet is much as possible. He does have to walk on it sometimes because his wheelchair and walker do not fit in the bathroom.     Allergies:  Adhesive tape      Current Outpatient Medications:   •  alfuzosin (UROXATRAL) 10 MG 24 hr tablet, Take 10 mg by mouth Daily., Disp: , Rfl:   •  apixaban (ELIQUIS) 5 MG tablet tablet, Take 5 mg by mouth 2 (two) times a day., Disp: , Rfl:   •  buPROPion XL (WELLBUTRIN XL) 300 MG 24 hr tablet, Take 300 mg by mouth Every Morning., Disp: , Rfl:   •  citalopram (CeleXA) 10 MG tablet, Take 10 mg by mouth Every Night., Disp: , Rfl:   •  glucose blood test strip, Test blood sugar 3 times a day, Disp: 100 each, Rfl: 5  •  HYDROcodone-acetaminophen (Norco) 5-325 MG per tablet, Take 1 tablet by mouth Every 4 (Four) Hours As Needed for Moderate Pain  for up to 18 doses., Disp: 18 tablet, Rfl: 0  •  insulin aspart (NovoLOG) 100 UNIT/ML injection, Inject 20 Units under the skin into the appropriate area as directed 3 (Three) Times a Day Before Meals. Take up to 20 units as instructed, Disp: 20 mL, Rfl: 3  •  insulin detemir (LEVEMIR) 100 UNIT/ML injection, Inject 50 Units under the skin into the appropriate area as directed Every Night. Take 40 units and " adjust to 50 as instructed, Disp: 20 mL, Rfl: 3  •  lisinopril (PRINIVIL,ZESTRIL) 20 MG tablet, Take 20 mg by mouth Daily., Disp: , Rfl:   •  metFORMIN (GLUCOPHAGE) 1000 MG tablet, Take 1,000 mg by mouth 2 (two) times a day., Disp: , Rfl:   •  metoprolol succinate XL (TOPROL-XL) 50 MG 24 hr tablet, Take 50 mg by mouth Daily., Disp: , Rfl:   •  pregabalin (LYRICA) 25 MG capsule, Take 1 capsule by mouth Every 12 (Twelve) Hours for 15 days., Disp: 30 capsule, Rfl: 0  •  sertraline (ZOLOFT) 100 MG tablet, Take 100 mg by mouth Daily., Disp: , Rfl:   •  silver sulfadiazine (Silvadene) 1 % cream, Apply 1 application topically to the appropriate area as directed Daily., Disp: 50 g, Rfl: 1  •  simvastatin (ZOCOR) 20 MG tablet, Take 20 mg by mouth Every Night., Disp: , Rfl:   •  sodium hypochlorite (DAKIN'S 1/4 STRENGTH) 0.125 % solution topical solution 0.125%, Apply 10 mL topically to the appropriate area as directed 2 (Two) Times a Day., Disp: 1000 mL, Rfl: 1  •  Trulicity 1.5 MG/0.5ML solution pen-injector, INJECT ONE SYRINGE UNDER THE SKIN ONCE WEEKLY **APPOINTMENT NEEDED FOR MORE REFILLS**, Disp: 6 mL, Rfl: 2    Past Medical History:   Diagnosis Date   • Absence of toe of right foot    • Acute osteomyelitis of left calcaneus  8/18/2021   • Anxiety and depression    • Arthritis    • Claustrophobia    • Corns and callus    • Diabetic ulcer of left heel associated with type 2 DM 8/18/2021   • Diabetic ulcer of left heel associated with type 2 DM 7/6/2021   • Diabetic ulcer of right midfoot associated with type 2 DM 8/18/2021   • Difficulty walking    • Essential hypertension 8/31/2021   • Hammertoe    • Hyperlipidemia LDL goal <100 8/31/2021   • Ingrown toenail    • Obesity    • Paroxysmal atrial fibrillation 8/31/2021   • Polyneuropathy    • Pressure ulcer, stage 1    • Tinea unguium    • Type 2 diabetes mellitus with polyneuropathy      Past Surgical History:   Procedure Laterality Date   • CYST REMOVAL      center of  back; benign   • INCISION AND DRAINAGE ABSCESS      back   • INCISION AND DRAINAGE LEG Right 12/10/2021    Procedure: INCISION AND DRAINAGE LOWER EXTREMITY;  Surgeon: Ash Leyva DPM;  Location: [facility];  Service: Podiatry;  Laterality: Right;   • OTHER SURGICAL HISTORY      Surgical clips left foot   • TOE SURGERY Right     Removal of 5th toe   • TRANS METATARSAL AMPUTATION Right 2021    Procedure: AMPUTATION TRANS METATARSAL;  Surgeon: Ash Leyva DPM;  Location: [facility];  Service: Podiatry;  Laterality: Right;   • WRIST SURGERY Left     repair of injury     Social History     Socioeconomic History   • Marital status: Single   Tobacco Use   • Smoking status: Former Smoker     Packs/day: 0.00     Years: 1.00     Pack years: 0.00     Quit date: 1991     Years since quittin.0   • Smokeless tobacco: Never Used   • Tobacco comment: quit at age 30   Vaping Use   • Vaping Use: Never used   Substance and Sexual Activity   • Alcohol use: Yes     Comment: Rare   • Drug use: Yes     Types: Marijuana     Comment: Daily   • Sexual activity: Defer           Objective     Vitals:    22 1458   BP: 146/86   BP Location: Left arm   Patient Position: Sitting   Pulse: 92   Resp: 18   Temp: 97.4 °F (36.3 °C)   TempSrc: Temporal   PainSc:   8   PainLoc: Foot     There is no height or weight on file to calculate BMI.     Height 71 inches, weight 340 pounds, BMI 47.4    STEADI Fall Risk Assessment has not been completed.     Review of Systems     ROS:  No fevers, chills, sweats, or body aches.     I have reviewed the HPI and ROS as documented by MA/RN. Shea Daniels MD    Physical Exam     NAD  Normocephalic, atraumatic  EOMI, no scleral icterus  No respiratory distress, no cough  AAOx3, pleasant, cooperative  Left plantar wound with severe recurrent callus and deep hemosiderin deposition, even more than normal.  TTP with deep palpation laterally but no pain with sharp  debridement.  No evidence of infection. Wound appears about the same or very slightly smaller.    Right foot is similar or slightly worse again.  Tissue appearance is fairly stable.  There continues to be pressure injury of the central tissues of the wound and mild to moderate periwound maceration no TTP.  There is decreased maceration and callus formation along the healed edges of the wound.    See photos for details.    RLE:          LLE:            Result Review :  The following data was reviewed by: Shea Daniels MD on 09/21/2022:    Prior notes and images.    Wound debridement  Performed by: Shea Daniels MD  Authorized by: Shea Daniels MD     Correct patient: Identification verified by two methods:     Verbally and Date of birth  Correct procedure/consent    Correct site/side    Correct equipment as applicable    How many wounds are you performing a debridement on?:  2  Wound 1:     Nursing Documentation:     Wound Location:  Left plantar heel  Measurements:     The total amount debrided on this wound was under 20 cm2.     Provider Documentation    Debridement type:  Sharp/excisional    Betadine      Other:  Sterile 10 blade     None    Tissue debrided:  Large    Level removed:  Skin    Patient tolerance:  Good    Hemostasis achieved by:  Silver nitrate and Direct pressure    Wound Bed Post Debridement: See Photo    Wound 2:     Wound Location:  Right foot   Measurements:    The total amount debrided on this wound was under 20 cm2.      Provider Documentation:     Debridement type:  Sharp/Excisional    Betadine      Other:  Sterile 10 blade     None    Tissue debrided:  Moderate    Level removed:  Subcutaneous    Patient tolerance:  Good    Hemostasis achieved by:  Silver Nitrate and Direct Pressure    Wound Bed Post Debridement: See Photo                               Assessment and Plan   Diagnoses and all orders for this visit:    1. Diabetic ulcer of left heel associated with type 2 diabetes  mellitus, with necrosis of bone (HCC) (Primary)  -     Wound debridement    2. Postoperative wound dehiscence, subsequent encounter  -     Wound debridement    3. Amputation of right midfoot (HCC)    4. Diabetic polyneuropathy associated with type 2 diabetes mellitus (HCC)    5. Obesity with serious comorbidity, unspecified classification, unspecified obesity type        Wounds are stable or slightly proved.    Padmini Ag applied to both plantar foot wounds.  This should be done daily or every other day depending on absorption.    Offload distal right foot at all times!  This is very challenging for him to do but is very important in determining how well he heals.  He continues to have significant pressure injury and tissue destruction particularly centrally.    Work on improving diabetic control and blood sugar levels.    Recheck 4 weeks, sooner if problems arise.    Patient was given instructions and counseling regarding their condition or for health maintenance advice, as well as the wound care plan and recommendations. They understand and agree with the plan.  They will follow back up here in the clinic but are instructed to contact us in the interim should they have any new, returning, or worsening symptoms or concerns. Please see specific information pulled into the AVS if appropriate.     Dragon Dictation utilized for chart completion.    Follow Up   Return in about 4 weeks (around 10/19/2022) for Recheck.      Shea Daniels MD

## 2022-09-22 NOTE — PROGRESS NOTES
Did we do a good job with your visit?   Yes                     Do your have your meds/supplies?    Yes                   Do you have a follow up appt?  Yes                  Any suggestions to improve our service?  None at this time

## 2022-10-01 ENCOUNTER — APPOINTMENT (OUTPATIENT)
Dept: GENERAL RADIOLOGY | Facility: HOSPITAL | Age: 62
End: 2022-10-01

## 2022-10-01 ENCOUNTER — HOSPITAL ENCOUNTER (INPATIENT)
Facility: HOSPITAL | Age: 62
LOS: 4 days | Discharge: HOME HEALTH | End: 2022-10-05
Attending: EMERGENCY MEDICINE | Admitting: STUDENT IN AN ORGANIZED HEALTH CARE EDUCATION/TRAINING PROGRAM

## 2022-10-01 DIAGNOSIS — R26.2 DIFFICULTY WALKING: ICD-10-CM

## 2022-10-01 DIAGNOSIS — M86.9 OSTEOMYELITIS, UNSPECIFIED SITE, UNSPECIFIED TYPE: Primary | ICD-10-CM

## 2022-10-01 LAB
ALBUMIN SERPL-MCNC: 2.8 G/DL (ref 3.5–5.2)
ALBUMIN/GLOB SERPL: 0.8 G/DL
ALP SERPL-CCNC: 131 U/L (ref 39–117)
ALT SERPL W P-5'-P-CCNC: 44 U/L (ref 1–41)
ANION GAP SERPL CALCULATED.3IONS-SCNC: 6.8 MMOL/L (ref 5–15)
AST SERPL-CCNC: 46 U/L (ref 1–40)
BASOPHILS # BLD AUTO: 0.02 10*3/MM3 (ref 0–0.2)
BASOPHILS NFR BLD AUTO: 0.2 % (ref 0–1.5)
BILIRUB SERPL-MCNC: 2.2 MG/DL (ref 0–1.2)
BUN SERPL-MCNC: 13 MG/DL (ref 8–23)
BUN/CREAT SERPL: 21 (ref 7–25)
CALCIUM SPEC-SCNC: 8.1 MG/DL (ref 8.6–10.5)
CHLORIDE SERPL-SCNC: 102 MMOL/L (ref 98–107)
CO2 SERPL-SCNC: 23.2 MMOL/L (ref 22–29)
CREAT SERPL-MCNC: 0.62 MG/DL (ref 0.76–1.27)
CRP SERPL-MCNC: 13.61 MG/DL (ref 0–0.5)
D-LACTATE SERPL-SCNC: 2.1 MMOL/L (ref 0.5–2)
DEPRECATED RDW RBC AUTO: 47.9 FL (ref 37–54)
EGFRCR SERPLBLD CKD-EPI 2021: 107.4 ML/MIN/1.73
EOSINOPHIL # BLD AUTO: 0.02 10*3/MM3 (ref 0–0.4)
EOSINOPHIL NFR BLD AUTO: 0.2 % (ref 0.3–6.2)
ERYTHROCYTE [DISTWIDTH] IN BLOOD BY AUTOMATED COUNT: 16 % (ref 12.3–15.4)
ERYTHROCYTE [SEDIMENTATION RATE] IN BLOOD: 35 MM/HR (ref 0–20)
GLOBULIN UR ELPH-MCNC: 3.3 GM/DL
GLUCOSE BLDC GLUCOMTR-MCNC: 167 MG/DL (ref 70–99)
GLUCOSE BLDC GLUCOMTR-MCNC: 221 MG/DL (ref 70–99)
GLUCOSE SERPL-MCNC: 201 MG/DL (ref 65–99)
HCT VFR BLD AUTO: 34.8 % (ref 37.5–51)
HGB BLD-MCNC: 11.4 G/DL (ref 13–17.7)
IMM GRANULOCYTES # BLD AUTO: 0.06 10*3/MM3 (ref 0–0.05)
IMM GRANULOCYTES NFR BLD AUTO: 0.6 % (ref 0–0.5)
LYMPHOCYTES # BLD AUTO: 0.92 10*3/MM3 (ref 0.7–3.1)
LYMPHOCYTES NFR BLD AUTO: 9.5 % (ref 19.6–45.3)
MCH RBC QN AUTO: 26.8 PG (ref 26.6–33)
MCHC RBC AUTO-ENTMCNC: 32.8 G/DL (ref 31.5–35.7)
MCV RBC AUTO: 81.7 FL (ref 79–97)
MONOCYTES # BLD AUTO: 0.64 10*3/MM3 (ref 0.1–0.9)
MONOCYTES NFR BLD AUTO: 6.6 % (ref 5–12)
NEUTROPHILS NFR BLD AUTO: 8.04 10*3/MM3 (ref 1.7–7)
NEUTROPHILS NFR BLD AUTO: 82.9 % (ref 42.7–76)
NRBC BLD AUTO-RTO: 0 /100 WBC (ref 0–0.2)
PLATELET # BLD AUTO: 69 10*3/MM3 (ref 140–450)
PMV BLD AUTO: 12.3 FL (ref 6–12)
POTASSIUM SERPL-SCNC: 4.4 MMOL/L (ref 3.5–5.2)
PROT SERPL-MCNC: 6.1 G/DL (ref 6–8.5)
RBC # BLD AUTO: 4.26 10*6/MM3 (ref 4.14–5.8)
SODIUM SERPL-SCNC: 132 MMOL/L (ref 136–145)
WBC NRBC COR # BLD: 9.7 10*3/MM3 (ref 3.4–10.8)

## 2022-10-01 PROCEDURE — 63710000001 INSULIN LISPRO (HUMAN) PER 5 UNITS: Performed by: STUDENT IN AN ORGANIZED HEALTH CARE EDUCATION/TRAINING PROGRAM

## 2022-10-01 PROCEDURE — 25010000002 PIPERACILLIN SOD-TAZOBACTAM PER 1 G: Performed by: EMERGENCY MEDICINE

## 2022-10-01 PROCEDURE — 82962 GLUCOSE BLOOD TEST: CPT

## 2022-10-01 PROCEDURE — 99223 1ST HOSP IP/OBS HIGH 75: CPT | Performed by: STUDENT IN AN ORGANIZED HEALTH CARE EDUCATION/TRAINING PROGRAM

## 2022-10-01 PROCEDURE — 99284 EMERGENCY DEPT VISIT MOD MDM: CPT

## 2022-10-01 PROCEDURE — 94799 UNLISTED PULMONARY SVC/PX: CPT

## 2022-10-01 PROCEDURE — 85652 RBC SED RATE AUTOMATED: CPT | Performed by: EMERGENCY MEDICINE

## 2022-10-01 PROCEDURE — 80053 COMPREHEN METABOLIC PANEL: CPT | Performed by: EMERGENCY MEDICINE

## 2022-10-01 PROCEDURE — 25010000002 PIPERACILLIN SOD-TAZOBACTAM PER 1 G: Performed by: STUDENT IN AN ORGANIZED HEALTH CARE EDUCATION/TRAINING PROGRAM

## 2022-10-01 PROCEDURE — 85025 COMPLETE CBC W/AUTO DIFF WBC: CPT | Performed by: EMERGENCY MEDICINE

## 2022-10-01 PROCEDURE — 83036 HEMOGLOBIN GLYCOSYLATED A1C: CPT | Performed by: STUDENT IN AN ORGANIZED HEALTH CARE EDUCATION/TRAINING PROGRAM

## 2022-10-01 PROCEDURE — 83605 ASSAY OF LACTIC ACID: CPT | Performed by: EMERGENCY MEDICINE

## 2022-10-01 PROCEDURE — 87040 BLOOD CULTURE FOR BACTERIA: CPT | Performed by: EMERGENCY MEDICINE

## 2022-10-01 PROCEDURE — 86140 C-REACTIVE PROTEIN: CPT | Performed by: EMERGENCY MEDICINE

## 2022-10-01 PROCEDURE — 73630 X-RAY EXAM OF FOOT: CPT

## 2022-10-01 PROCEDURE — 63710000001 INSULIN DETEMIR PER 5 UNITS: Performed by: STUDENT IN AN ORGANIZED HEALTH CARE EDUCATION/TRAINING PROGRAM

## 2022-10-01 RX ORDER — INSULIN LISPRO 100 [IU]/ML
0-9 INJECTION, SOLUTION INTRAVENOUS; SUBCUTANEOUS
Status: DISCONTINUED | OUTPATIENT
Start: 2022-10-01 | End: 2022-10-05 | Stop reason: HOSPADM

## 2022-10-01 RX ORDER — ATORVASTATIN CALCIUM 10 MG/1
10 TABLET, FILM COATED ORAL NIGHTLY
Status: DISCONTINUED | OUTPATIENT
Start: 2022-10-01 | End: 2022-10-05 | Stop reason: HOSPADM

## 2022-10-01 RX ORDER — NITROGLYCERIN 0.4 MG/1
0.4 TABLET SUBLINGUAL
Status: DISCONTINUED | OUTPATIENT
Start: 2022-10-01 | End: 2022-10-05 | Stop reason: HOSPADM

## 2022-10-01 RX ORDER — NICOTINE POLACRILEX 4 MG
15 LOZENGE BUCCAL
Status: DISCONTINUED | OUTPATIENT
Start: 2022-10-01 | End: 2022-10-05 | Stop reason: HOSPADM

## 2022-10-01 RX ORDER — LISINOPRIL 20 MG/1
20 TABLET ORAL DAILY
Status: DISCONTINUED | OUTPATIENT
Start: 2022-10-01 | End: 2022-10-05 | Stop reason: HOSPADM

## 2022-10-01 RX ORDER — DEXTROSE MONOHYDRATE 25 G/50ML
25 INJECTION, SOLUTION INTRAVENOUS
Status: DISCONTINUED | OUTPATIENT
Start: 2022-10-01 | End: 2022-10-05 | Stop reason: HOSPADM

## 2022-10-01 RX ORDER — SODIUM CHLORIDE 9 MG/ML
40 INJECTION, SOLUTION INTRAVENOUS AS NEEDED
Status: DISCONTINUED | OUTPATIENT
Start: 2022-10-01 | End: 2022-10-05 | Stop reason: HOSPADM

## 2022-10-01 RX ORDER — CALCIUM CARBONATE 200(500)MG
2 TABLET,CHEWABLE ORAL 3 TIMES DAILY PRN
Status: DISCONTINUED | OUTPATIENT
Start: 2022-10-01 | End: 2022-10-05 | Stop reason: HOSPADM

## 2022-10-01 RX ORDER — SODIUM CHLORIDE 0.9 % (FLUSH) 0.9 %
10 SYRINGE (ML) INJECTION EVERY 12 HOURS SCHEDULED
Status: DISCONTINUED | OUTPATIENT
Start: 2022-10-01 | End: 2022-10-05 | Stop reason: HOSPADM

## 2022-10-01 RX ORDER — SERTRALINE HYDROCHLORIDE 100 MG/1
100 TABLET, FILM COATED ORAL DAILY
Status: DISCONTINUED | OUTPATIENT
Start: 2022-10-01 | End: 2022-10-05 | Stop reason: HOSPADM

## 2022-10-01 RX ORDER — METOPROLOL SUCCINATE 50 MG/1
50 TABLET, EXTENDED RELEASE ORAL DAILY
Status: DISCONTINUED | OUTPATIENT
Start: 2022-10-01 | End: 2022-10-05 | Stop reason: HOSPADM

## 2022-10-01 RX ORDER — BUPROPION HYDROCHLORIDE 150 MG/1
300 TABLET ORAL
Status: DISCONTINUED | OUTPATIENT
Start: 2022-10-02 | End: 2022-10-05 | Stop reason: HOSPADM

## 2022-10-01 RX ORDER — SODIUM CHLORIDE 0.9 % (FLUSH) 0.9 %
10 SYRINGE (ML) INJECTION AS NEEDED
Status: DISCONTINUED | OUTPATIENT
Start: 2022-10-01 | End: 2022-10-05 | Stop reason: HOSPADM

## 2022-10-01 RX ORDER — CITALOPRAM 20 MG/1
10 TABLET ORAL NIGHTLY
Status: DISCONTINUED | OUTPATIENT
Start: 2022-10-01 | End: 2022-10-05 | Stop reason: HOSPADM

## 2022-10-01 RX ORDER — ALUMINA, MAGNESIA, AND SIMETHICONE 2400; 2400; 240 MG/30ML; MG/30ML; MG/30ML
15 SUSPENSION ORAL EVERY 6 HOURS PRN
Status: DISCONTINUED | OUTPATIENT
Start: 2022-10-01 | End: 2022-10-01

## 2022-10-01 RX ORDER — ACETAMINOPHEN 325 MG/1
650 TABLET ORAL EVERY 6 HOURS PRN
Status: DISCONTINUED | OUTPATIENT
Start: 2022-10-01 | End: 2022-10-05 | Stop reason: HOSPADM

## 2022-10-01 RX ADMIN — SODIUM CHLORIDE 1000 ML: 9 INJECTION, SOLUTION INTRAVENOUS at 12:33

## 2022-10-01 RX ADMIN — APIXABAN 5 MG: 5 TABLET, FILM COATED ORAL at 21:12

## 2022-10-01 RX ADMIN — CITALOPRAM HYDROBROMIDE 10 MG: 20 TABLET ORAL at 21:12

## 2022-10-01 RX ADMIN — ATORVASTATIN CALCIUM 10 MG: 10 TABLET, FILM COATED ORAL at 21:12

## 2022-10-01 RX ADMIN — LISINOPRIL 20 MG: 20 TABLET ORAL at 18:37

## 2022-10-01 RX ADMIN — ACETAMINOPHEN 650 MG: 325 TABLET ORAL at 17:45

## 2022-10-01 RX ADMIN — INSULIN DETEMIR 35 UNITS: 100 INJECTION, SOLUTION SUBCUTANEOUS at 21:13

## 2022-10-01 RX ADMIN — TAZOBACTAM SODIUM AND PIPERACILLIN SODIUM 3.38 G: 375; 3 INJECTION, SOLUTION INTRAVENOUS at 12:33

## 2022-10-01 RX ADMIN — Medication 3000 MG: at 12:48

## 2022-10-01 RX ADMIN — METOPROLOL SUCCINATE 50 MG: 50 TABLET, EXTENDED RELEASE ORAL at 17:45

## 2022-10-01 RX ADMIN — INSULIN LISPRO 2 UNITS: 100 INJECTION, SOLUTION INTRAVENOUS; SUBCUTANEOUS at 18:37

## 2022-10-01 RX ADMIN — PIPERACILLIN SODIUM AND TAZOBACTAM SODIUM 4.5 G: 4; 500 INJECTION, POWDER, FOR SOLUTION INTRAVENOUS at 21:12

## 2022-10-01 RX ADMIN — CALCIUM CARBONATE 2 TABLET: 500 TABLET, CHEWABLE ORAL at 21:11

## 2022-10-01 RX ADMIN — Medication 10 ML: at 21:12

## 2022-10-01 RX ADMIN — SERTRALINE HYDROCHLORIDE 100 MG: 100 TABLET ORAL at 18:37

## 2022-10-01 NOTE — ED PROVIDER NOTES
Time: 12:10 PM EDT  Arrived by: ambulance  Chief Complaint: Wound infection  History provided by: Patient  History is limited by: N/A     History of Present Illness:  Patient is a 61 y.o. year old male who presents to the emergency department with a wound infection on his left foot. Patient states that he has a history of diabetes. Patient reports having a fever. Patient denies vomiting, diarrhea.       History provided by:  Patient  Wound Infection  Location:  Left foot   Severity:  Mild  Onset quality:  Sudden  Duration:  3 days  Timing:  Constant  Progression:  Worsening  Relieved by:  Medications  Worsened by:  Palpation  Associated symptoms: fever    Associated symptoms: no abdominal pain, no chest pain, no congestion, no cough, no diarrhea, no headaches, no myalgias, no nausea, no rhinorrhea, no shortness of breath, no sore throat and no vomiting        Similar Symptoms Previously: No  Recently seen: Yes      Patient Care Team  Primary Care Provider: Provider, No Known    Past Medical History:     Allergies   Allergen Reactions   • Adhesive Tape Rash     Past Medical History:   Diagnosis Date   • Absence of toe of right foot    • Acute osteomyelitis of left calcaneus  8/18/2021   • Anxiety and depression    • Arthritis    • Claustrophobia    • Corns and callus    • Diabetic ulcer of left heel associated with type 2 DM 8/18/2021   • Diabetic ulcer of left heel associated with type 2 DM 7/6/2021   • Diabetic ulcer of right midfoot associated with type 2 DM 8/18/2021   • Difficulty walking    • Essential hypertension 8/31/2021   • Hammertoe    • Hyperlipidemia LDL goal <100 8/31/2021   • Ingrown toenail    • Obesity    • Paroxysmal atrial fibrillation 8/31/2021   • Polyneuropathy    • Pressure ulcer, stage 1    • Tinea unguium    • Type 2 diabetes mellitus with polyneuropathy      Past Surgical History:   Procedure Laterality Date   • CYST REMOVAL      center of back; benign   • INCISION AND DRAINAGE ABSCESS       back   • INCISION AND DRAINAGE LEG Right 12/10/2021    Procedure: INCISION AND DRAINAGE LOWER EXTREMITY;  Surgeon: Ash Leyva DPM;  Location: [facility] MAIN OR;  Service: Podiatry;  Laterality: Right;   • OTHER SURGICAL HISTORY      Surgical clips left foot   • TOE SURGERY Right     Removal of 5th toe   • TRANS METATARSAL AMPUTATION Right 12/2/2021    Procedure: AMPUTATION TRANS METATARSAL;  Surgeon: Ash Leyva DPM;  Location: [facility] MAIN OR;  Service: Podiatry;  Laterality: Right;   • WRIST SURGERY Left     repair of injury     Family History   Problem Relation Age of Onset   • Heart disease Mother    • Heart disease Father    • Cancer Father         Unspecified   • Heart disease Brother        Home Medications:  Prior to Admission medications    Medication Sig Start Date End Date Taking? Authorizing Provider   alfuzosin (UROXATRAL) 10 MG 24 hr tablet Take 10 mg by mouth Daily. 6/11/21   Laura Connor MD   apixaban (ELIQUIS) 5 MG tablet tablet Take 5 mg by mouth 2 (two) times a day.    Laura Connor MD   buPROPion XL (WELLBUTRIN XL) 300 MG 24 hr tablet Take 300 mg by mouth Every Morning.    Laura Connor MD   citalopram (CeleXA) 10 MG tablet Take 10 mg by mouth Every Night. 6/11/21   Laura Connor MD   glucose blood test strip Test blood sugar 3 times a day 9/23/21   Kami Garcia APRN   HYDROcodone-acetaminophen (Norco) 5-325 MG per tablet Take 1 tablet by mouth Every 4 (Four) Hours As Needed for Moderate Pain  for up to 18 doses. 12/21/21   Yung Grijalva MD   insulin aspart (NovoLOG) 100 UNIT/ML injection Inject 20 Units under the skin into the appropriate area as directed 3 (Three) Times a Day Before Meals. Take up to 20 units as instructed 9/29/21 9/29/22  Kami Garcia APRN   insulin detemir (LEVEMIR) 100 UNIT/ML injection Inject 50 Units under the skin into the appropriate area as directed Every Night. Take 40 units and adjust to 50 as  instructed 21   Kami Garcia APRN   lisinopril (PRINIVIL,ZESTRIL) 20 MG tablet Take 20 mg by mouth Daily.    Laura Connor MD   metFORMIN (GLUCOPHAGE) 1000 MG tablet Take 1,000 mg by mouth 2 (two) times a day.    Laura Connor MD   metoprolol succinate XL (TOPROL-XL) 50 MG 24 hr tablet Take 50 mg by mouth Daily. 21   Laura Connor MD   pregabalin (LYRICA) 25 MG capsule Take 1 capsule by mouth Every 12 (Twelve) Hours for 15 days. 21  Yung Grijalva MD   sertraline (ZOLOFT) 100 MG tablet Take 100 mg by mouth Daily.    Laura Connor MD   silver sulfadiazine (Silvadene) 1 % cream Apply 1 application topically to the appropriate area as directed Daily. 22   Shea Daniels MD   simvastatin (ZOCOR) 20 MG tablet Take 20 mg by mouth Every Night.    Laura Connor MD   sodium hypochlorite (DAKIN'S 1/4 STRENGTH) 0.125 % solution topical solution 0.125% Apply 10 mL topically to the appropriate area as directed 2 (Two) Times a Day. 22   Shea Daniels MD   Trulicity 1.5 MG/0.5ML solution pen-injector INJECT ONE SYRINGE UNDER THE SKIN ONCE WEEKLY **APPOINTMENT NEEDED FOR MORE REFILLS** 22   Kami Garcia APRN        Social History:   Social History     Tobacco Use   • Smoking status: Former Smoker     Packs/day: 0.00     Years: 1.00     Pack years: 0.00     Quit date: 1991     Years since quittin.1   • Smokeless tobacco: Never Used   • Tobacco comment: quit at age 30   Vaping Use   • Vaping Use: Never used   Substance Use Topics   • Alcohol use: Yes     Comment: Rare   • Drug use: Yes     Types: Marijuana     Comment: Daily         Review of Systems:  Review of Systems   Constitutional: Positive for fever. Negative for chills.   HENT: Negative for congestion, rhinorrhea and sore throat.    Eyes: Negative for pain and visual disturbance.   Respiratory: Negative for apnea, cough, chest tightness and shortness of breath.   "  Cardiovascular: Negative for chest pain and palpitations.   Gastrointestinal: Negative for abdominal pain, diarrhea, nausea and vomiting.   Genitourinary: Negative for difficulty urinating and dysuria.   Musculoskeletal: Negative for joint swelling and myalgias.   Skin: Positive for wound (Infected wound on left foot). Negative for color change.   Neurological: Negative for seizures and headaches.   Psychiatric/Behavioral: Negative.    All other systems reviewed and are negative.       Physical Exam:  /69 (BP Location: Left arm, Patient Position: Sitting)   Pulse 102   Temp 98.2 °F (36.8 °C) (Oral)   Resp 18   Ht 188 cm (74\")   Wt (!) 164 kg (360 lb 10.8 oz)   SpO2 100%   BMI 46.31 kg/m²     Physical Exam  Vitals and nursing note reviewed.   Constitutional:       General: He is not in acute distress.     Appearance: Normal appearance. He is not toxic-appearing.   HENT:      Head: Normocephalic and atraumatic.      Jaw: There is normal jaw occlusion.   Eyes:      General: Lids are normal.      Extraocular Movements: Extraocular movements intact.      Conjunctiva/sclera: Conjunctivae normal.      Pupils: Pupils are equal, round, and reactive to light.   Cardiovascular:      Rate and Rhythm: Normal rate and regular rhythm.      Pulses: Normal pulses.      Heart sounds: Normal heart sounds.   Pulmonary:      Effort: Pulmonary effort is normal. No respiratory distress.      Breath sounds: Normal breath sounds. No wheezing or rhonchi.   Abdominal:      General: Abdomen is flat.      Palpations: Abdomen is soft.      Tenderness: There is no abdominal tenderness. There is no guarding or rebound.   Musculoskeletal:         General: Normal range of motion.      Cervical back: Normal range of motion and neck supple.      Right lower leg: No edema.      Left lower leg: No edema.      Comments: Lower extremity erythema   Feet:      Comments: Mid foot amputation with erythema and drainage; small area of wound " dehiscence.   Skin:     General: Skin is warm and dry.   Neurological:      Mental Status: He is alert and oriented to person, place, and time. Mental status is at baseline.   Psychiatric:         Mood and Affect: Mood normal.                Medications in the Emergency Department:  Medications   vancomycin 3000 mg/500 mL 0.9% NS IVPB ([facility]) (3,000 mg Intravenous New Bag 10/1/22 1248)   piperacillin-tazobactam (ZOSYN) 3.375 g in iso-osmotic dextrose 50 ml (premix) (0 g Intravenous Stopped 10/1/22 1248)   sodium chloride 0.9 % bolus 1,000 mL (0 mL Intravenous Stopped 10/1/22 1336)        Labs  Lab Results (last 24 hours)     Procedure Component Value Units Date/Time    Lactic Acid, Plasma [[id number]]  (Abnormal) Collected: 10/01/22 1226    Specimen: Blood Updated: 10/01/22 1312     Lactate 2.1 mmol/L     Blood Culture - Blood, Arm, Left [[id number]] Collected: 10/01/22 1226    Specimen: Blood from Arm, Left Updated: 10/01/22 1232    Sedimentation Rate [[id number]]  (Abnormal) Collected: 10/01/22 1226    Specimen: Blood Updated: 10/01/22 1326     Sed Rate 35 mm/hr     C-reactive Protein [[id number]]  (Abnormal) Collected: 10/01/22 1226    Specimen: Blood Updated: 10/01/22 1259     C-Reactive Protein 13.61 mg/dL     Comprehensive Metabolic Panel [[id number]]  (Abnormal) Collected: 10/01/22 1335    Specimen: Blood Updated: 10/01/22 1417     Glucose 201 mg/dL      BUN 13 mg/dL      Creatinine 0.62 mg/dL      Sodium 132 mmol/L      Potassium 4.4 mmol/L      Comment: Specimen hemolyzed.  Results may be affected.        Chloride 102 mmol/L      CO2 23.2 mmol/L      Calcium 8.1 mg/dL      Total Protein 6.1 g/dL      Albumin 2.80 g/dL      ALT (SGPT) 44 U/L      Comment: Specimen hemolyzed.  Results may be affected.        AST (SGOT) 46 U/L      Comment: Specimen hemolyzed.  Results may be affected.        Alkaline Phosphatase 131 U/L      Total Bilirubin 2.2 mg/dL      Globulin 3.3 gm/dL      A/G Ratio 0.8 g/dL       BUN/Creatinine Ratio 21.0     Anion Gap 6.8 mmol/L      eGFR 107.4 mL/min/1.73      Comment: National Kidney Foundation and American Society of Nephrology (ASN) Task Force recommended calculation based on the Chronic Kidney Disease Epidemiology Collaboration (CKD-EPI) equation refit without adjustment for race.       Narrative:      GFR Normal >60  Chronic Kidney Disease <60  Kidney Failure <15      CBC & Differential [[id number]]  (Abnormal) Collected: 10/01/22 1342    Specimen: Blood Updated: 10/01/22 1416    Narrative:      The following orders were created for panel order CBC & Differential.  Procedure                               Abnormality         Status                     ---------                               -----------         ------                     CBC Auto Differential[[id number]]        Abnormal            Final result               Scan Slide[[id number]]                                                                    Please view results for these tests on the individual orders.    CBC Auto Differential [[id number]]  (Abnormal) Collected: 10/01/22 1342    Specimen: Blood Updated: 10/01/22 1416     WBC 9.70 10*3/mm3      RBC 4.26 10*6/mm3      Hemoglobin 11.4 g/dL      Hematocrit 34.8 %      MCV 81.7 fL      MCH 26.8 pg      MCHC 32.8 g/dL      RDW 16.0 %      RDW-SD 47.9 fl      MPV 12.3 fL      Platelets 69 10*3/mm3      Neutrophil % 82.9 %      Lymphocyte % 9.5 %      Monocyte % 6.6 %      Eosinophil % 0.2 %      Basophil % 0.2 %      Immature Grans % 0.6 %      Neutrophils, Absolute 8.04 10*3/mm3      Lymphocytes, Absolute 0.92 10*3/mm3      Monocytes, Absolute 0.64 10*3/mm3      Eosinophils, Absolute 0.02 10*3/mm3      Basophils, Absolute 0.02 10*3/mm3      Immature Grans, Absolute 0.06 10*3/mm3      nRBC 0.0 /100 WBC            Imaging:  XR Foot 3+ View Right    Result Date: 10/1/2022  PROCEDURE: XR FOOT 3+ VW RIGHT  COMPARISON: [facility], CR, FOOT >OR= 3V RT, 11/05/2020,  8:53.  [facility], CR, XR FOOT 2 VW RIGHT, 12/02/2021, 21:35.  INDICATIONS: RIGHT FOOT PAIN  FINDINGS:   Prior transmetatarsal amputation of the right foot.  There is new cortical irregularity and sclerosis in the remaining distal tarsals.  Stable linear metallic foreign body in the plantar medial soft tissues.  There is mild generalized soft tissue swelling.  IMPRESSION: New cortical irregularity and sclerosis in the remaining distal right tarsal bones possibly secondary to degenerative change or osteomyelitis.   FILIBERTO OWUSU MD       Electronically Signed and Approved By: FILIBERTO OWUSU MD on 10/01/2022 at 13:06               Procedures:  Procedures    Progress                            Medical Decision Making:  MDM  Number of Diagnoses or Management Options  Osteomyelitis, unspecified site, unspecified type (HCC)  Diagnosis management comments: The patient´s CBC that was reviewed and interpreted by me shows no abnormalities of critical concern. Of note, there is no anemia requiring a blood transfusion and the platelet count is acceptable.  The patient´s CMP that was reviewed and interpretted by me shows no abnormalities of critical concern. Of note, the patient´s sodium and potassium are acceptable. The patient´s liver enzymes are unremarkable. The patient´s renal function (creatinine) is preserved. The patient has a normal anion gap.  Lactic acid is 2.1.  CRP is 13.  Sed rate is 35.  The patient was given vancomycin and Zosyn in the emergency department.  Case was discussed with Dr. Leyva who agrees with admission.  He does agree to consult.  The case was also discussed with Dr. Donahue who agrees to admit the patient.    Sepsis criteria was met in the emergency department and the Sepsis protocol (including antibiotic administration) was initiated.      SIRS criteria considered:   1.  Temperature > 100.4 or <98.6    2.  Heart Rate > 90    3.  Respiratory Rate > 22    4.  WBC > 12K or <4K.              Severe Sepsis:     Respiratory: Mechanical Ventilation or Bipap  Hypotension: SBP > 90 or MAP < 65  Renal: Creatinine > 2  Metabolic: Lactic Acid > 2  Hematologic: Platelets < 100K or INR > 1.5  Hepatic: BILI  >  2  CNS: Sudden AMS     Septic Shock:     Severe Sepsis + Persistent hypotension or Lactic Acid > 4     Normal saline bolus, Antibiotics, and final disposition was based on these definitions.        Sepsis was recognized at 1210    Antibiotics were ordered.     30 cc/kg bolus was not indicated.       Patient did not receive the recommended 30ml/kg fluid bolus for sepsis because it would be harmful or detrimental to the patient.      Total Critical Care time of 35 minutes. Total critical care time documented does not include time spent on separately billed procedures for services of nurses or physician assistants. I personally saw and examined the patient. I have reviewed all diagnostic interpretations and treatment plans as written. I was present for the key portions of any procedures performed and the inclusive time noted in any critical care statement. Critical care time includes patient management by me, time spent at the patients bedside,  time to review lab and imaging results, discussing patient care, documentation in the medical record, and time spent with family or caregiver.       Amount and/or Complexity of Data Reviewed  Clinical lab tests: reviewed  Tests in the radiology section of CPT®: reviewed  Discussion of test results with the performing providers: yes  Discuss the patient with other providers: yes  Independent visualization of images, tracings, or specimens: yes    Risk of Complications, Morbidity, and/or Mortality  Presenting problems: moderate  Management options: moderate    Critical Care  Total time providing critical care: 30-74 minutes    Patient Progress  Patient progress: stable       Final diagnoses:   Osteomyelitis, unspecified site, unspecified type (HCC)         Disposition:  ED Disposition     ED Disposition   Decision to Admit    Condition   --    Comment   Level of Care: Telemetry [5]   Diagnosis: Osteomyelitis (HCC) [410019]   Certification: I Certify That Inpatient Hospital Services Are Medically Necessary For Greater Than 2 Midnights               Deena Barahona  10/01/22 1214       Deena Barahona  10/01/22 1218       Deena Barahona  10/01/22 2660       Brown Vasquez MD  10/01/22 6817

## 2022-10-01 NOTE — PROGRESS NOTES
Pharmacy to Dose Vancomycin Day: 1  Consulting Provider: Dr. Donahue  Clinical Indication: skin and soft tissue infection    Assessment/Plan  Loading dose: 3000mg IV  Regimen: 1500mg IV q12h  Exposure Target: AUC24 (range) 400-600 mg/L.hr  AUC24,ss: 538 mg/L.hr  PAUC*: 71 %  Ctrough,ss: 13.6 mg/L  Pconc*: 33 %  Tox.: 9 %    Note: Pharmacy to dose vancomycin for this 61 year old male for a skin and soft tissue infection.  Patient received a loading dose of vancomycin 3000mg in the emergency department.  Will continue with 1500mg IV q12h per [employer] recommendations and check level prior to the 3rd or 4th dose or as clinically indicated.    Level due: prior to the 3rd or 4th dose or as clinically indicated    Thank you for allowing pharmacy to assist in the care of this patient.     Charisse Hardy, PharmD

## 2022-10-01 NOTE — H&P
[facility] ER HISTORY AND PHYSICAL  Date: 10/1/2022   Patient Name: Jose Shaikh  : 1960  MRN: [id number]  Primary Care Physician:  Provider, No Known  Date of admission: 10/1/2022    Subjective   Subjective     Chief Complaint: Pain at his diabetic ulcer on both feet    HPI:    Jose Shaikh is a 61 y.o. 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  Patient stated that he has a home health nurse that comes every Thursday to change his wound dressing on his right foot with amputation and it fell out yesterday and he been walking at home without the dressing.  He said he felt he had chills and fever and he took Tylenol and that went away.  He is saying that his right foot is very tender to touch and he has becoming progressively red.  Patient denies having any chest pain shortness of breath abdominal pain diarrhea or constipation.  Patient states that he been using his medications religiously.  Patient states that he takes 50 units of insulin nightly and sliding scale during the day.  He said he is lost 80 pounds in the last 1 year.    In the ED he was given vancomycin and Zosyn.  His C-reactive protein was 13.6 and lactate 2.1.  His sed rate was 35.  There was concerns for osteomyelitis and the podiatrist was consulted and they recommended to the patient be admitted for management.      Personal History     Past Medical History:   Diagnosis Date   • Absence of toe of right foot    • Acute osteomyelitis of left calcaneus  2021   • Anxiety and depression    • Arthritis    • Claustrophobia    • Corns and callus    • Diabetic ulcer of left heel associated with type 2 DM 2021   • Diabetic ulcer of left heel associated  with type 2 DM 2021   • Diabetic ulcer of right midfoot associated with type 2 DM 2021   • Difficulty walking    • Essential hypertension 2021   • Hammertoe    • Hyperlipidemia LDL goal <100 2021   • Ingrown toenail    • Obesity    • Paroxysmal atrial fibrillation 2021   • Polyneuropathy    • Pressure ulcer, stage 1    • Tinea unguium    • Type 2 diabetes mellitus with polyneuropathy          Past Surgical History:   Procedure Laterality Date   • CYST REMOVAL      center of back; benign   • INCISION AND DRAINAGE ABSCESS      back   • INCISION AND DRAINAGE LEG Right 12/10/2021    Procedure: INCISION AND DRAINAGE LOWER EXTREMITY;  Surgeon: Ash Leyva DPM;  Location: [facility] MAIN OR;  Service: Podiatry;  Laterality: Right;   • OTHER SURGICAL HISTORY      Surgical clips left foot   • TOE SURGERY Right     Removal of 5th toe   • TRANS METATARSAL AMPUTATION Right 2021    Procedure: AMPUTATION TRANS METATARSAL;  Surgeon: Ash Leyva DPM;  Location: [facility] MAIN OR;  Service: Podiatry;  Laterality: Right;   • WRIST SURGERY Left     repair of injury         Family History   Problem Relation Age of Onset   • Heart disease Mother    • Heart disease Father    • Cancer Father         Unspecified   • Heart disease Brother          Social History     Socioeconomic History   • Marital status: Single   Tobacco Use   • Smoking status: Former Smoker     Packs/day: 0.00     Years: 1.00     Pack years: 0.00     Quit date: 1991     Years since quittin.1   • Smokeless tobacco: Never Used   • Tobacco comment: quit at age 30   Vaping Use   • Vaping Use: Never used   Substance and Sexual Activity   • Alcohol use: Yes     Comment: Rare   • Drug use: Yes     Types: Marijuana     Comment: Daily   • Sexual activity: Defer         Home Medications:  Dulaglutide, HYDROcodone-acetaminophen, alfuzosin, apixaban, buPROPion XL, citalopram, glucose blood, insulin aspart, insulin detemir,  lisinopril, metFORMIN, metoprolol succinate XL, pregabalin, sertraline, silver sulfadiazine, simvastatin, and sodium hypochlorite    Allergies:  Allergies   Allergen Reactions   • Adhesive Tape Rash       Review of Systems   Constitutional: Positive for chills and fever. Negative for fatigue.   HENT: Negative for postnasal drip, sinus pain, sore throat and trouble swallowing.    Eyes: Negative for pain, discharge and itching.   Respiratory: Negative for cough, chest tightness, shortness of breath and wheezing.    Cardiovascular: Negative for chest pain, palpitations and leg swelling.   Gastrointestinal: Negative for abdominal pain, blood in stool, constipation, diarrhea, nausea and vomiting.   Genitourinary: Negative for difficulty urinating, dysuria, frequency, hematuria and urgency.   Skin: Positive for color change and wound. Negative for rash.   Neurological: Negative for dizziness, tremors, seizures, syncope, facial asymmetry, speech difficulty, weakness, light-headedness, numbness and headaches.   Psychiatric/Behavioral: Negative for agitation and confusion. The patient is not nervous/anxious and is not hyperactive.         Objective   Objective     Vitals:   Temp:  [98.2 °F (36.8 °C)] 98.2 °F (36.8 °C)  Heart Rate:  [102] 102  Resp:  [18] 18  BP: (126)/(69) 126/69    Physical Exam    Constitutional: Awake, alert, no acute distress   Eyes: Pupils equal, sclerae anicteric, no conjunctival injection   HENT: NCAT, mucous membranes moist   Neck: Supple, no thyromegaly, no lymphadenopathy, trachea midline   Respiratory: Clear to auscultation bilaterally, nonlabored respirations    Cardiovascular: RRR, no murmurs, rubs, or gallops, palpable pedal pulses bilaterally   Gastrointestinal: Positive bowel sounds, soft, nontender, nondistended   Musculoskeletal: No bilateral ankle edema, no clubbing or cyanosis to extremities   Psychiatric: Appropriate affect, cooperative   Neurologic: Oriented x 3, strength symmetric in  all extremities, Cranial Nerves grossly intact to confrontation, speech clear   Skin: Midfoot amputation, redness elevated to his calf, tender to touch.  There is dressing on his left foot.  Left foot is not red.    Result Review    Result Review:  I have personally reviewed the results from the time of this admission to 10/1/2022 15:12 EDT and agree with these findings:  [x]  Laboratory  []  Microbiology  [x]  Radiology  []  EKG/Telemetry   []  Cardiology/Vascular   []  Pathology  []  Old records  []  Other:    Imaging Results (Last 24 Hours)     Procedure Component Value Units Date/Time    XR Foot 3+ View Right [[id number]] Collected: 10/01/22 1306     Updated: 10/01/22 1309    Narrative:      PROCEDURE: XR FOOT 3+ VW RIGHT     COMPARISON: [facility], CR, FOOT >OR= 3V RT, 11/05/2020, 8:53.  [facility], CR, XR FOOT 2 VW RIGHT, 12/02/2021, 21:35.     INDICATIONS: RIGHT FOOT PAIN     FINDINGS:      Prior transmetatarsal amputation of the right foot.  There is new cortical irregularity and   sclerosis in the remaining distal tarsals.  Stable linear metallic foreign body in the plantar   medial soft tissues.  There is mild generalized soft tissue swelling.     IMPRESSION:  New cortical irregularity and sclerosis in the remaining distal right tarsal bones possibly   secondary to degenerative change or osteomyelitis.       FILIBERTO OWUSU MD         Electronically Signed and Approved By: FILIBERTO OWUSU MD on 10/01/2022 at 13:06                            Assessment & Plan   Assessment / Plan     Assessment  #Cellulitis versus osteomyelitis of right foot  #Lactic acidosis  #History of A. Fib  #History of hypertension  #History of diabetes      Plan:   -Admit to telemetry bed  -Continue Zosyn vancomycin  -Podiatry consult  -Continue Eliquis  -Continue metoprolol  -35 units of insulin with medium sliding scale  -Diabetic diet  -Full code  -Morning labs  -Procalcitonin  -Acetaminophen for  pain  -Blood culture        DVT prophylaxis:  No DVT prophylaxis order currently exists.    CODE STATUS:         Admission Status:  I believe this patient meets inpatient status.    Estefany Donahue MD

## 2022-10-02 ENCOUNTER — APPOINTMENT (OUTPATIENT)
Dept: MRI IMAGING | Facility: HOSPITAL | Age: 62
End: 2022-10-02

## 2022-10-02 PROBLEM — M79.672 FOOT PAIN, BILATERAL: Status: ACTIVE | Noted: 2022-10-02

## 2022-10-02 PROBLEM — M79.671 FOOT PAIN, BILATERAL: Status: ACTIVE | Noted: 2022-10-02

## 2022-10-02 PROBLEM — L60.0 ONYCHOCRYPTOSIS: Status: ACTIVE | Noted: 2022-10-02

## 2022-10-02 PROBLEM — B35.1 ONYCHOMYCOSIS: Chronic | Status: ACTIVE | Noted: 2022-10-02

## 2022-10-02 LAB
ALBUMIN SERPL-MCNC: 3.1 G/DL (ref 3.5–5.2)
ALBUMIN/GLOB SERPL: 1 G/DL
ALP SERPL-CCNC: 134 U/L (ref 39–117)
ALT SERPL W P-5'-P-CCNC: 38 U/L (ref 1–41)
ANION GAP SERPL CALCULATED.3IONS-SCNC: 11.1 MMOL/L (ref 5–15)
AST SERPL-CCNC: 28 U/L (ref 1–40)
BASOPHILS # BLD AUTO: 0.02 10*3/MM3 (ref 0–0.2)
BASOPHILS NFR BLD AUTO: 0.3 % (ref 0–1.5)
BILIRUB SERPL-MCNC: 2.1 MG/DL (ref 0–1.2)
BUN SERPL-MCNC: 14 MG/DL (ref 8–23)
BUN/CREAT SERPL: 22.6 (ref 7–25)
CALCIUM SPEC-SCNC: 8.3 MG/DL (ref 8.6–10.5)
CHLORIDE SERPL-SCNC: 102 MMOL/L (ref 98–107)
CO2 SERPL-SCNC: 21.9 MMOL/L (ref 22–29)
CREAT SERPL-MCNC: 0.62 MG/DL (ref 0.76–1.27)
DEPRECATED RDW RBC AUTO: 47.6 FL (ref 37–54)
EGFRCR SERPLBLD CKD-EPI 2021: 107.4 ML/MIN/1.73
EOSINOPHIL # BLD AUTO: 0.05 10*3/MM3 (ref 0–0.4)
EOSINOPHIL NFR BLD AUTO: 0.6 % (ref 0.3–6.2)
ERYTHROCYTE [DISTWIDTH] IN BLOOD BY AUTOMATED COUNT: 16.1 % (ref 12.3–15.4)
GLOBULIN UR ELPH-MCNC: 3.2 GM/DL
GLUCOSE BLDC GLUCOMTR-MCNC: 185 MG/DL (ref 70–99)
GLUCOSE BLDC GLUCOMTR-MCNC: 191 MG/DL (ref 70–99)
GLUCOSE BLDC GLUCOMTR-MCNC: 201 MG/DL (ref 70–99)
GLUCOSE BLDC GLUCOMTR-MCNC: 210 MG/DL (ref 70–99)
GLUCOSE SERPL-MCNC: 202 MG/DL (ref 65–99)
HBA1C MFR BLD: 7.3 % (ref 4.8–5.6)
HCT VFR BLD AUTO: 34.9 % (ref 37.5–51)
HGB BLD-MCNC: 11.7 G/DL (ref 13–17.7)
IMM GRANULOCYTES # BLD AUTO: 0.03 10*3/MM3 (ref 0–0.05)
IMM GRANULOCYTES NFR BLD AUTO: 0.4 % (ref 0–0.5)
LYMPHOCYTES # BLD AUTO: 0.88 10*3/MM3 (ref 0.7–3.1)
LYMPHOCYTES NFR BLD AUTO: 11 % (ref 19.6–45.3)
MAGNESIUM SERPL-MCNC: 1.7 MG/DL (ref 1.6–2.4)
MCH RBC QN AUTO: 27.5 PG (ref 26.6–33)
MCHC RBC AUTO-ENTMCNC: 33.5 G/DL (ref 31.5–35.7)
MCV RBC AUTO: 81.9 FL (ref 79–97)
MONOCYTES # BLD AUTO: 0.69 10*3/MM3 (ref 0.1–0.9)
MONOCYTES NFR BLD AUTO: 8.7 % (ref 5–12)
NEUTROPHILS NFR BLD AUTO: 6.3 10*3/MM3 (ref 1.7–7)
NEUTROPHILS NFR BLD AUTO: 79 % (ref 42.7–76)
NRBC BLD AUTO-RTO: 0 /100 WBC (ref 0–0.2)
PHOSPHATE SERPL-MCNC: 1.9 MG/DL (ref 2.5–4.5)
PLATELET # BLD AUTO: 73 10*3/MM3 (ref 140–450)
PMV BLD AUTO: 12.2 FL (ref 6–12)
POTASSIUM SERPL-SCNC: 3.8 MMOL/L (ref 3.5–5.2)
PROCALCITONIN SERPL-MCNC: 0.71 NG/ML (ref 0–0.25)
PROT SERPL-MCNC: 6.3 G/DL (ref 6–8.5)
RBC # BLD AUTO: 4.26 10*6/MM3 (ref 4.14–5.8)
SODIUM SERPL-SCNC: 135 MMOL/L (ref 136–145)
WBC NRBC COR # BLD: 7.97 10*3/MM3 (ref 3.4–10.8)

## 2022-10-02 PROCEDURE — 63710000001 INSULIN DETEMIR PER 5 UNITS: Performed by: STUDENT IN AN ORGANIZED HEALTH CARE EDUCATION/TRAINING PROGRAM

## 2022-10-02 PROCEDURE — 25010000002 PIPERACILLIN SOD-TAZOBACTAM PER 1 G: Performed by: STUDENT IN AN ORGANIZED HEALTH CARE EDUCATION/TRAINING PROGRAM

## 2022-10-02 PROCEDURE — 11720 DEBRIDE NAIL 1-5: CPT | Performed by: PODIATRIST

## 2022-10-02 PROCEDURE — 82962 GLUCOSE BLOOD TEST: CPT

## 2022-10-02 PROCEDURE — 94799 UNLISTED PULMONARY SVC/PX: CPT

## 2022-10-02 PROCEDURE — 84145 PROCALCITONIN (PCT): CPT | Performed by: STUDENT IN AN ORGANIZED HEALTH CARE EDUCATION/TRAINING PROGRAM

## 2022-10-02 PROCEDURE — 85025 COMPLETE CBC W/AUTO DIFF WBC: CPT | Performed by: STUDENT IN AN ORGANIZED HEALTH CARE EDUCATION/TRAINING PROGRAM

## 2022-10-02 PROCEDURE — 25010000002 ONDANSETRON PER 1 MG: Performed by: INTERNAL MEDICINE

## 2022-10-02 PROCEDURE — 80053 COMPREHEN METABOLIC PANEL: CPT | Performed by: STUDENT IN AN ORGANIZED HEALTH CARE EDUCATION/TRAINING PROGRAM

## 2022-10-02 PROCEDURE — 25010000002 PROCHLORPERAZINE 10 MG/2ML SOLUTION: Performed by: INTERNAL MEDICINE

## 2022-10-02 PROCEDURE — 0 GADOBENATE DIMEGLUMINE 529 MG/ML SOLUTION: Performed by: INTERNAL MEDICINE

## 2022-10-02 PROCEDURE — 25010000002 VANCOMYCIN 5 G RECONSTITUTED SOLUTION: Performed by: STUDENT IN AN ORGANIZED HEALTH CARE EDUCATION/TRAINING PROGRAM

## 2022-10-02 PROCEDURE — 63710000001 INSULIN LISPRO (HUMAN) PER 5 UNITS: Performed by: STUDENT IN AN ORGANIZED HEALTH CARE EDUCATION/TRAINING PROGRAM

## 2022-10-02 PROCEDURE — 83735 ASSAY OF MAGNESIUM: CPT | Performed by: STUDENT IN AN ORGANIZED HEALTH CARE EDUCATION/TRAINING PROGRAM

## 2022-10-02 PROCEDURE — 84100 ASSAY OF PHOSPHORUS: CPT | Performed by: STUDENT IN AN ORGANIZED HEALTH CARE EDUCATION/TRAINING PROGRAM

## 2022-10-02 PROCEDURE — 99233 SBSQ HOSP IP/OBS HIGH 50: CPT | Performed by: INTERNAL MEDICINE

## 2022-10-02 PROCEDURE — 99232 SBSQ HOSP IP/OBS MODERATE 35: CPT | Performed by: PODIATRIST

## 2022-10-02 PROCEDURE — 11042 DBRDMT SUBQ TIS 1ST 20SQCM/<: CPT | Performed by: PODIATRIST

## 2022-10-02 PROCEDURE — A9577 INJ MULTIHANCE: HCPCS | Performed by: INTERNAL MEDICINE

## 2022-10-02 PROCEDURE — 73720 MRI LWR EXTREMITY W/O&W/DYE: CPT

## 2022-10-02 RX ORDER — ONDANSETRON 2 MG/ML
4 INJECTION INTRAMUSCULAR; INTRAVENOUS EVERY 6 HOURS PRN
Status: DISCONTINUED | OUTPATIENT
Start: 2022-10-02 | End: 2022-10-05 | Stop reason: HOSPADM

## 2022-10-02 RX ORDER — PROCHLORPERAZINE EDISYLATE 5 MG/ML
5 INJECTION INTRAMUSCULAR; INTRAVENOUS EVERY 6 HOURS PRN
Status: DISCONTINUED | OUTPATIENT
Start: 2022-10-02 | End: 2022-10-05 | Stop reason: HOSPADM

## 2022-10-02 RX ORDER — TAMSULOSIN HYDROCHLORIDE 0.4 MG/1
0.4 CAPSULE ORAL NIGHTLY
Status: DISCONTINUED | OUTPATIENT
Start: 2022-10-02 | End: 2022-10-05 | Stop reason: HOSPADM

## 2022-10-02 RX ORDER — SACCHAROMYCES BOULARDII 250 MG
250 CAPSULE ORAL 2 TIMES DAILY
Status: DISCONTINUED | OUTPATIENT
Start: 2022-10-03 | End: 2022-10-05 | Stop reason: HOSPADM

## 2022-10-02 RX ADMIN — INSULIN DETEMIR 35 UNITS: 100 INJECTION, SOLUTION SUBCUTANEOUS at 21:46

## 2022-10-02 RX ADMIN — CITALOPRAM HYDROBROMIDE 10 MG: 20 TABLET ORAL at 21:47

## 2022-10-02 RX ADMIN — INSULIN LISPRO 2 UNITS: 100 INJECTION, SOLUTION INTRAVENOUS; SUBCUTANEOUS at 14:00

## 2022-10-02 RX ADMIN — VANCOMYCIN HYDROCHLORIDE 1500 MG: 5 INJECTION, POWDER, LYOPHILIZED, FOR SOLUTION INTRAVENOUS at 00:35

## 2022-10-02 RX ADMIN — DAKIN'S SOLUTION 0.125% (QUARTER STRENGTH): 0.12 SOLUTION at 23:30

## 2022-10-02 RX ADMIN — Medication 10 ML: at 08:57

## 2022-10-02 RX ADMIN — INSULIN LISPRO 2 UNITS: 100 INJECTION, SOLUTION INTRAVENOUS; SUBCUTANEOUS at 08:57

## 2022-10-02 RX ADMIN — ATORVASTATIN CALCIUM 10 MG: 10 TABLET, FILM COATED ORAL at 21:47

## 2022-10-02 RX ADMIN — GADOBENATE DIMEGLUMINE 20 ML: 529 INJECTION, SOLUTION INTRAVENOUS at 13:11

## 2022-10-02 RX ADMIN — ACETAMINOPHEN 650 MG: 325 TABLET ORAL at 20:22

## 2022-10-02 RX ADMIN — SERTRALINE HYDROCHLORIDE 100 MG: 100 TABLET ORAL at 08:58

## 2022-10-02 RX ADMIN — INSULIN LISPRO 2 UNITS: 100 INJECTION, SOLUTION INTRAVENOUS; SUBCUTANEOUS at 18:33

## 2022-10-02 RX ADMIN — PIPERACILLIN SODIUM AND TAZOBACTAM SODIUM 4.5 G: 4; 500 INJECTION, POWDER, FOR SOLUTION INTRAVENOUS at 20:23

## 2022-10-02 RX ADMIN — APIXABAN 5 MG: 5 TABLET, FILM COATED ORAL at 21:47

## 2022-10-02 RX ADMIN — PIPERACILLIN SODIUM AND TAZOBACTAM SODIUM 4.5 G: 4; 500 INJECTION, POWDER, FOR SOLUTION INTRAVENOUS at 04:38

## 2022-10-02 RX ADMIN — POTASSIUM & SODIUM PHOSPHATES POWDER PACK 280-160-250 MG 1 PACKET: 280-160-250 PACK at 21:50

## 2022-10-02 RX ADMIN — ACETAMINOPHEN 650 MG: 325 TABLET ORAL at 04:39

## 2022-10-02 RX ADMIN — METOPROLOL SUCCINATE 50 MG: 50 TABLET, EXTENDED RELEASE ORAL at 08:57

## 2022-10-02 RX ADMIN — LISINOPRIL 20 MG: 20 TABLET ORAL at 08:57

## 2022-10-02 RX ADMIN — ONDANSETRON 4 MG: 2 INJECTION INTRAMUSCULAR; INTRAVENOUS at 05:25

## 2022-10-02 RX ADMIN — PIPERACILLIN SODIUM AND TAZOBACTAM SODIUM 4.5 G: 4; 500 INJECTION, POWDER, FOR SOLUTION INTRAVENOUS at 14:00

## 2022-10-02 RX ADMIN — PROCHLORPERAZINE EDISYLATE 5 MG: 5 INJECTION INTRAMUSCULAR; INTRAVENOUS at 11:50

## 2022-10-02 RX ADMIN — VANCOMYCIN HYDROCHLORIDE 1500 MG: 5 INJECTION, POWDER, LYOPHILIZED, FOR SOLUTION INTRAVENOUS at 14:54

## 2022-10-02 RX ADMIN — APIXABAN 5 MG: 5 TABLET, FILM COATED ORAL at 08:57

## 2022-10-02 RX ADMIN — TAMSULOSIN HYDROCHLORIDE 0.4 MG: 0.4 CAPSULE ORAL at 21:47

## 2022-10-02 NOTE — PROGRESS NOTES
"Pharmacy to Dose Vancomycin Day: 2    Jose Shaikh is a 61 y.o.male admitted with a skin and soft tissue infection. Pharmacy has been consulted to dose IV Vancomycin     Consulting Provider: Dr. Donahue  Clinical Indication: skin and soft tissue infection  Goal -600 mg/L.hr  Duration of therapy: 5 days per consult    188 cm (74\")       10/01/22  1141      Weight: (!) 164 kg (360 lb 10.8 oz)         Estimated Creatinine Clearance: 198.4 mL/min (A) (by C-G formula based on SCr of 0.62 mg/dL (L)).  Results from last 7 days   Lab Units 10/02/22  0450 10/01/22  1335   BUN mg/dL 14 13   CREATININE mg/dL 0.62* 0.62*     HD/PD/CRRT?: no    Lab Results   Component Value Date    WBC 7.97 10/02/2022      Temperature    10/01/22 2223 10/02/22 0435 10/02/22 0745   Temp: 98.8 °F (37.1 °C) 98.2 °F (36.8 °C) (P) 98.2 °F (36.8 °C)      Contrast Administered: no    Relevant Micro:   Microbiology Results (last 10 days)       ** No results found for the last 240 hours. **          Imaging:   10/1 right foot x-ray: \"New cortical irregularity and sclerosis in the remaining distal right tarsal bones possibly   secondary to degenerative change or osteomyelitis.\"    Other Antimicrobial Therapy: zosyn    Assessment/Plan  Loading dose: 3000mg IV  Regimen: 1500mg IV q12h  Exposure Target: AUC24 (range) 400-600 mg/L.hr  AUC24,ss: 538 mg/L.hr  PAUC*: 71 %  Ctrough,ss: 13.6 mg/L  Pconc*: 33 %  Tox.: 9 %    Note: Will continue current dose of vancomycin and order a vancomycin random for 10/3 at 1000 to assess clearance and need for any dose adjustments.     Level due: vanc random 10/3 at 1000  "

## 2022-10-02 NOTE — CONSULTS
[facility]   HISTORY AND PHYSICAL    Patient Name: Jose Shaikh  : 1960  MRN: [id number]  Primary Care Physician:  Provider, No Known  Date of admission: 10/1/2022    Subjective   Subjective     Chief Complaint: Bilateral foot ulcerations and painful toenails on left foot.    HPI:    Jose Shaikh is a 61 y.o. male  is well-known to me from prior surgeries of his right foot.      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      Patient stated that he has a home health nurse that comes every Thursday to change his wound dressing on his right foot with amputation and it fell off yesterday and he been walking at home without the dressing.  He said he felt he had chills and fever and he took Tylenol and that went away.  He is saying that his right foot is very tender to touch and he has becoming progressively red.  Patient states that he takes 50 units of insulin nightly and sliding scale during the day.  He said he is lost 80 pounds in the last 1 year.     In the ED he was given vancomycin and Zosyn.  His C-reactive protein was 13.6 and lactate 2.1.  His sed rate was 35.  There was concerns for osteomyelitis and the podiatrist was consulted and they recommended to the patient be admitted for management.    Patient is regularly followed by Dr. Shea Daniels at the wound care center.    Patient denies any fevers, chills, nausea, vomiting, shortness of breathe, nor any other constitutional signs nor symptoms.    Review of Systems   All systems were reviewed and negative except for: Bilateral foot ulcerations and elongated toenails on left foot.    Personal History     Past Medical History:   Diagnosis Date   • Absence of toe of right foot    •  Acute osteomyelitis of left calcaneus  8/18/2021   • Anxiety and depression    • Arthritis    • Claustrophobia    • Corns and callus    • Diabetic ulcer of left heel associated with type 2 DM 8/18/2021   • Diabetic ulcer of left heel associated with type 2 DM 7/6/2021   • Diabetic ulcer of right midfoot associated with type 2 DM 8/18/2021   • Difficulty walking    • Essential hypertension 8/31/2021   • Hammertoe    • Hyperlipidemia LDL goal <100 8/31/2021   • Ingrown toenail    • Obesity    • Paroxysmal atrial fibrillation 8/31/2021   • Polyneuropathy    • Pressure ulcer, stage 1    • Tinea unguium    • Type 2 diabetes mellitus with polyneuropathy        Past Surgical History:   Procedure Laterality Date   • CYST REMOVAL      center of back; benign   • INCISION AND DRAINAGE ABSCESS      back   • INCISION AND DRAINAGE LEG Right 12/10/2021    Procedure: INCISION AND DRAINAGE LOWER EXTREMITY;  Surgeon: Ash Leyva DPM;  Location: [facility];  Service: Podiatry;  Laterality: Right;   • OTHER SURGICAL HISTORY      Surgical clips left foot   • TOE SURGERY Right     Removal of 5th toe   • TRANS METATARSAL AMPUTATION Right 12/2/2021    Procedure: AMPUTATION TRANS METATARSAL;  Surgeon: Ash Leyva DPM;  Location: [facility] OR;  Service: Podiatry;  Laterality: Right;   • WRIST SURGERY Left     repair of injury       Family History: family history includes Cancer in his father; Heart disease in his brother, father, and mother. Otherwise pertinent FHx was reviewed and not pertinent to current issue.    Social History:  reports that he quit smoking about 31 years ago. He smoked 0.00 packs per day for 1.00 year. He has never used smokeless tobacco. He reports current alcohol use. He reports current drug use. Drug: Marijuana.    Home Medications:  Dulaglutide, HYDROcodone-acetaminophen, alfuzosin, apixaban, buPROPion XL, citalopram, glucose blood, insulin aspart, insulin detemir, lisinopril,  metFORMIN, metoprolol succinate XL, sertraline, silver sulfadiazine, simvastatin, and sodium hypochlorite      Allergies:  Allergies   Allergen Reactions   • Adhesive Tape Rash       Objective   Objective     Vitals:   Temp:  [98.1 °F (36.7 °C)-100.2 °F (37.9 °C)] (P) 98.1 °F (36.7 °C)  Heart Rate:  [71-92] (P) 71  Resp:  [16-20] (P) 18  BP: (100-117)/(52-64) (P) 123/62  Physical Exam      GEN:   A&Ox3, Patient seen at bedside in no apparent distress.    Physical Exam  Vitals and nursing note reviewed.   Constitutional:       General: He is not in acute distress.     Appearance: He is ill-appearing.   Cardiovascular:      Pulses:           Dorsalis pedis pulses are 1+ on the right side and 1+ on the left side.        Posterior tibial pulses are 1+ on the right side and 1+ on the left side.   Feet:      Right foot:      Protective Sensation: 4 sites sensed.      Skin integrity: Ulcer, erythema and warmth present.      Left foot:      Protective Sensation: 8 sites sensed.      Skin integrity: Ulcer present.      Toenail Condition: Left toenails are abnormally thick and ingrown.         Foot/Ankle Exam:       General:   Appearance comment:  Chronically ill, morbidly obese  Orientation: AAOx3    Affect: appropriate      VASCULAR      Right Foot Vascularity   Dorsalis pedis:  1+  Posterior tibial:  1+  Skin Temperature: warm    Edema Gradin+  CFT:  3  Pedal Hair Growth:  Absent  Varicosities: mild varicosities       Left Foot Vascularity   Dorsalis pedis:  1+  Posterior tibial:  1+  Edema Gradin+ and non-pitting  CFT:  3  Pedal Hair Growth:  Absent  Varicosities: mild varicosities        NEUROLOGIC     Right Foot Neurologic   Light touch sensation:  Diminished  Vibratory sensation:  Diminished  Hot/Cold sensation: diminished    Protective Sensation using [employer]ella Monofilament:  4     Left Foot Neurologic   Light touch sensation:  Diminished  Vibratory sensation:  Diminished  Hot/cold sensation:  diminished    Protective Sensation using [place]-Marcella Monofilament:  8     MUSCULOSKELETAL      Right Foot Musculoskeletal    Amputation   Trans metatarsal right foot: Yes    Ecchymosis:  None  Tenderness comment:  Tenderness to midfoot.     Left Foot Musculoskeletal   Ecchymosis:  None     MUSCLE STRENGTH     Right Foot Muscle Strength   Foot dorsiflexion:  4-  Foot plantar flexion:  4-  Foot inversion:  4-  Foot eversion:  4-     Left Foot Muscle Strength   Foot dorsiflexion:  4-  Foot plantar flexion:  4-  Foot inversion:  4-  Foot eversion:  4-     DERMATOLOGIC     Right Foot Dermatologic   Skin: cellulitis, erythema and ulcer       Left Foot Dermatologic   Skin: ulcer    Nails: onychomycosis, abnormally thick, subungual debris, dystrophic nails and ingrown toenail    Nails comment:  Toenails 1, 2, 3, 4, and 5      Right Foot Additional Comments Stage 3 ulceration on the right plantar midfoot area of the foot.  Nonviable tissue is present.  No surrounding, edema, erythema, lymphangitis, fluctuance, nor signs of infection.  Serous drainage present.  30 mm x 22 mm x 4 mm.  Does probe to bone.  This area was debrided via excisional subcutaneous debridement with a 10 scalpel blade.  Post debridement measurements were 33 mm x 24 mm x 5 mm in depth.        Left Foot Additional Comments: Stage 3 ulceration on the left plantar heel area of the foot.  Nonviable tissue is present.  No surrounding, edema, erythema, lymphangitis, fluctuance, nor signs of infection.  Serous drainage present.  8 mm x 7 mm x 4 mm.  Does probe to bone.  This area was debrided via excisional subcutaneous debridement with a 10 scalpel blade.  Post debridement measurements were 12 mm x 9 mm x 5 mm in depth.      XR Foot 3+ View Right    Result Date: 10/1/2022  Narrative: PROCEDURE: XR FOOT 3+ VW RIGHT  COMPARISON: [facility], [facility], FOOT >OR= 3V RT, 11/05/2020[facility] 8:53[facility]  [facility]  21:35.  INDICATIONS: RIGHT FOOT PAIN  FINDINGS:   Prior transmetatarsal amputation of the right foot.  There is new cortical irregularity and sclerosis in the remaining distal tarsals.  Stable linear metallic foreign body in the plantar medial soft tissues.  There is mild generalized soft tissue swelling.  IMPRESSION: New cortical irregularity and sclerosis in the remaining distal right tarsal bones possibly secondary to degenerative change or osteomyelitis.   FILIBERTO OWUSU MD       Electronically Signed and Approved By: FILIBERTO OWUSU MD on 10/01/2022 at 13:06             Right foot MRI results pending.    Result Review    Result Review:  I have personally reviewed the results from the time of this admission to 10/2/2022 14:36 EDT and agree with these findings:  [x]  Laboratory  []  Microbiology  [x]  Radiology  []  EKG/Telemetry   []  Cardiology/Vascular   []  Pathology  []  Old records  []  Other:      WBC   Date Value Ref Range Status   10/02/2022 7.97 3.40 - 10.80 10*3/mm3 Final     RBC   Date Value Ref Range Status   10/02/2022 4.26 4.14 - 5.80 10*6/mm3 Final     Hemoglobin   Date Value Ref Range Status   10/02/2022 11.7 (L) 13.0 - 17.7 g/dL Final     Hematocrit   Date Value Ref Range Status   10/02/2022 34.9 (L) 37.5 - 51.0 % Final     MCV   Date Value Ref Range Status   10/02/2022 81.9 79.0 - 97.0 fL Final     MCH   Date Value Ref Range Status   10/02/2022 27.5 26.6 - 33.0 pg Final     MCHC   Date Value Ref Range Status   10/02/2022 33.5 31.5 - 35.7 g/dL Final     RDW   Date Value Ref Range Status   10/02/2022 16.1 (H) 12.3 - 15.4 % Final     RDW-SD   Date Value Ref Range Status   10/02/2022 47.6 37.0 - 54.0 fl Final     MPV   Date Value Ref Range Status   10/02/2022 12.2 (H) 6.0 - 12.0 fL Final     Platelets   Date Value Ref Range Status   10/02/2022 73 (L) 140 - 450 10*3/mm3 Final     Neutrophil %   Date Value Ref Range Status   10/02/2022 79.0 (H) 42.7 - 76.0 % Final     Lymphocyte %   Date Value Ref  Range Status   10/02/2022 11.0 (L) 19.6 - 45.3 % Final     Monocyte %   Date Value Ref Range Status   10/02/2022 8.7 5.0 - 12.0 % Final     Eosinophil %   Date Value Ref Range Status   10/02/2022 0.6 0.3 - 6.2 % Final     Basophil %   Date Value Ref Range Status   10/02/2022 0.3 0.0 - 1.5 % Final     Immature Grans %   Date Value Ref Range Status   10/02/2022 0.4 0.0 - 0.5 % Final     Neutrophils, Absolute   Date Value Ref Range Status   10/02/2022 6.30 1.70 - 7.00 10*3/mm3 Final     Lymphocytes, Absolute   Date Value Ref Range Status   10/02/2022 0.88 0.70 - 3.10 10*3/mm3 Final     Monocytes, Absolute   Date Value Ref Range Status   10/02/2022 0.69 0.10 - 0.90 10*3/mm3 Final     Eosinophils, Absolute   Date Value Ref Range Status   10/02/2022 0.05 0.00 - 0.40 10*3/mm3 Final     Basophils, Absolute   Date Value Ref Range Status   10/02/2022 0.02 0.00 - 0.20 10*3/mm3 Final     Immature Grans, Absolute   Date Value Ref Range Status   10/02/2022 0.03 0.00 - 0.05 10*3/mm3 Final     nRBC   Date Value Ref Range Status   10/02/2022 0.0 0.0 - 0.2 /100 WBC Final        Lab Results   Component Value Date    GLUCOSE 202 (H) 10/02/2022    BUN 14 10/02/2022    CREATININE 0.62 (L) 10/02/2022    EGFRIFNONA 134 12/20/2021    BCR 22.6 10/02/2022    K 3.8 10/02/2022    CO2 21.9 (L) 10/02/2022    CALCIUM 8.3 (L) 10/02/2022    ALBUMIN 3.10 (L) 10/02/2022    LABIL2 1.3 (L) 08/07/2019    AST 28 10/02/2022    ALT 38 10/02/2022             Most notable findings include: Bilateral foot ulcerations and painful elongated toenails left foot    Assessment & Plan   Assessment / Plan       Active Hospital Problems:  Active Hospital Problems    Diagnosis    • Onychomycosis    • Onychocryptosis    • Foot pain, bilateral    • Osteomyelitis (HCC)        Patient Active Problem List   Diagnosis   • Diabetic ulcer of left heel associated with type 2 DM   • Acute osteomyelitis of left calcaneus    • Diabetic ulcer of left heel associated with type 2 DM    • Diabetic ulcer of right midfoot associated with type 2 DM   • Paroxysmal atrial fibrillation   • Essential hypertension   • Hyperlipidemia LDL goal <100   • Cellulitis and abscess of foot   • High alkaline phosphatase level   • Osteomyelitis (HCC)   • Onychomycosis   • Onychocryptosis   • Foot pain, bilateral         Plan:     A comprehensive foot exam was completed on this patient.    Toenails 1, 2, 3, 4, 5 on Left were debrided with nail nippers then filed with a Dremel nail magali.  Patient tolerated procedure well without complications.    Orders are written for quarter strength Dakin's wet-to-dry dressings to bilateral heel ulcers.    Wound care nurse consult pending.    Discussed with patient's nurse.    Depending on the MRI results, the patient may result in a below-knee amputation.    Thank you for including me in the care of this patient.      DVT prophylaxis:  Medical DVT prophylaxis orders are present.    CODE STATUS:    Level Of Support Discussed With: Patient  Code Status (Patient has no pulse and is not breathing): CPR (Attempt to Resuscitate)  Medical Interventions (Patient has pulse or is breathing): Full Support      Electronically signed by Ash Leyva DPM, 10/02/22, 2:24 PM EDT.

## 2022-10-02 NOTE — PAYOR COMM NOTE
"Jose Shaikh (61 y.o. Male)     +++REFERENCE / AUTH # [id number]  SUBJECT:  Inpatient Auth request/Initial clinicals   Pt name:     Jose Shaikh  MRN# :[id number]  Admitting MD:  Dr Estefany Donahue  Admitting MD NPI: [id number]  Admitting MD Phone:   [phone]    Admitting ICD - 10:  M86.9; E11.628; L03.119  DOS:  10-1-22  Admitted from: _x__ED;  Facility: [facility] NPI: [id number]; TID: [id number]  Facility address [address] [place] [address]  Facility Case Management Phone: [phone]; Fax: [phone]    DOCUMENTS SENT:  __x___ Face Sheet  __x___ Indicia MCG Review  __x___ H & P  ___x__ ED Records (MD notes)      __x___ LABS   __x___ Imaging  __x___ MED List                Date of Birth   1960    Social Security Number       Address   [address] [id number]    Home Phone   [phone]    MRN   [id number]       Yazidism   Nondenominational    Marital Status   Single                            Admission Date   10/1/22    Admission Type   Emergency    Admitting Provider   Estefany Donahue MD    Attending Provider   Estefany Donahue MD    Department, Room/Bed   [facility] 5TH FLOOR SURGICAL TELEMETRY UNIT, 509/1       Discharge Date       Discharge Disposition       Discharge Destination                               Attending Provider: Estefany Donahue MD    Allergies: Adhesive Tape    Isolation: Contact   Infection: MRSA (01/15/22)   Code Status: CPR   Advance Care Planning Activity    Ht: 188 cm (74\")   Wt: 164 kg (360 lb 10.8 oz)    Admission Cmt: None   Principal Problem: None                Active Insurance as of 10/1/2022     Primary Coverage     Payor Plan Insurance Group Employer/Plan Group    UNITED HEALTHCARE MEDICARE REPLACEMENT [facility] DUAL COMPLETE MEDICARE REPLACEMENT [id number]     Payor Plan Address Payor Plan Phone Number Payor Plan Fax Number Effective Dates    [address] [phone]  5/1/2022 - None Entered    [facility] [id number]    "    Subscriber Name Subscriber Birth Date Member ID       SAMI RAMIREZ 1960 [id number]           Secondary Coverage     Payor Plan Insurance Group Employer/Plan Group    [place]      Payor Plan Address Payor Plan Phone Number Payor Plan Fax Number Effective Dates    [address] [phone]  6/22/2021 - None Entered    [place] [address]       Subscriber Name Subscriber Birth Date Member ID       SAMI RAMIREZ 1960 [id number]                 Emergency Contacts          No emergency contacts on file.            Dominguez: NPI [id number] Tax ID [id number]       Cellulitis RRG - Inpatient Care by Kacye Kemp, RN         Met (Selected): Reviewed on 10/1/2022 by Kacey Kemp RN       Created Using Review Status Review Entered   [employer] Completed 10/1/2022 22:28       Criteria Set Name - Subset   Cellulitis RRG - Inpatient Care       Selected?   Yes - Inpatient Care is selected for the Cellulitis RRG criteria set.      Criteria Review      Inpatient Care    Most Recent : Kacey Kemp Most Recent Date: 10/1/2022 22:28:29 EDST    (X) Admission is indicated for  1 or more  of the following :       (X) Clinical presentation (eg, acuity of infection, rapidity of progression) is judged to require       intensity of patient monitoring (eg, vital sign measurement, checks for infection progression)       that cannot be provided at other than inpatient level of care.       10/1/2022 22:28:29 EDST by Kacey Kemp         On several antibiotics & closely followed by wound care clinic & podiatrist for 9-10 months, when he had the right MTT amputation. Bandage fell off 2 days ago & he has been walking barefoot. + erythema+ drainage. + dehiscence. IV Vanc started in ED ASAP   Notes:    10/1/2022 22:28:29 EDST by Kacey Kemp    Subject: Admission    To ED c/o foot pain & fever. Has been on several antibiotics & closely followed by both the wound care clinic & podiatrist for past 9-10  months, after his Right MTT amputation (Dec 2021).      Poor healing wound. His bandage fell off 2 days ago & he has been walking barefoot since then & wound has opened up & started draining. + erythema+ drainage. + dehiscence. IV Vanc started in ED.      PMHx: DM, Morbid obsesity; Afib; HLD. Foot amputation. Several I&D's. Debridements.      R Foot Xray:NEW cortical irregularity and sclerosis in the remaining distal right tarsal bones --possibly    secondary to osteomyelitis. .      Lactate 2.1; CRP 13.61; Platelets 69.      In ED:  IV NS 1000ml; Zosyn IV; Vanc 3grams IV.      Admit: Wound RN sandie; Podiatrist consult; Eliquis PO q12; IV VANC q12; Zosyn IV q8; Labs in AM                   History & Physical      Estefany Donahue MD at 10/01/22 [address] [place] [address] HISTORY AND PHYSICAL  Date: 10/1/2022   Patient Name: Jose Shaikh  : 1960  MRN: [id number]  Primary Care Physician:  Provider, No Known  Date of admission: 10/1/2022    Subjective   Subjective     Chief Complaint: Pain at his diabetic ulcer on both feet    HPI:    Jose Shaikh is a 61 y.o. 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  Patient stated that he has a home health nurse that comes every Thursday to change his wound dressing on his right foot with amputation and it fell out yesterday and he been walking at home without the dressing.  He said he felt he had chills and fever and he took Tylenol and that went away.  He is saying that his right foot is very tender to touch and he has becoming progressively red.  Patient denies having any chest pain shortness of breath abdominal pain diarrhea or constipation.  Patient states that he been using his  medications religiously.  Patient states that he takes 50 units of insulin nightly and sliding scale during the day.  He said he is lost 80 pounds in the last 1 year.    In the ED he was given vancomycin and Zosyn.  His C-reactive protein was 13.6 and lactate 2.1.  His sed rate was 35.  There was concerns for osteomyelitis and the podiatrist was consulted and they recommended to the patient be admitted for management.      Personal History     Past Medical History:   Diagnosis Date   • Absence of toe of right foot    • Acute osteomyelitis of left calcaneus  8/18/2021   • Anxiety and depression    • Arthritis    • Claustrophobia    • Corns and callus    • Diabetic ulcer of left heel associated with type 2 DM 8/18/2021   • Diabetic ulcer of left heel associated with type 2 DM 7/6/2021   • Diabetic ulcer of right midfoot associated with type 2 DM 8/18/2021   • Difficulty walking    • Essential hypertension 8/31/2021   • Hammertoe    • Hyperlipidemia LDL goal <100 8/31/2021   • Ingrown toenail    • Obesity    • Paroxysmal atrial fibrillation 8/31/2021   • Polyneuropathy    • Pressure ulcer, stage 1    • Tinea unguium    • Type 2 diabetes mellitus with polyneuropathy          Past Surgical History:   Procedure Laterality Date   • CYST REMOVAL      center of back; benign   • INCISION AND DRAINAGE ABSCESS      back   • INCISION AND DRAINAGE LEG Right 12/10/2021    Procedure: INCISION AND DRAINAGE LOWER EXTREMITY;  Surgeon: Ash Leyva DPM;  [facility];  Service: Podiatry;  Laterality: Right;   • OTHER SURGICAL HISTORY      Surgical clips left foot   • TOE SURGERY Right     Removal of 5th toe   • TRANS METATARSAL AMPUTATION Right 12/2/2021    Procedure: AMPUTATION TRANS METATARSAL;  Surgeon: Ash Leyva DPM;  [facility] OR;  Service: Podiatry;  Laterality: Right;   • WRIST SURGERY Left     repair of injury         Family History   Problem Relation Age of Onset   • Heart  disease Mother    • Heart disease Father    • Cancer Father         Unspecified   • Heart disease Brother          Social History     Socioeconomic History   • Marital status: Single   Tobacco Use   • Smoking status: Former Smoker     Packs/day: 0.00     Years: 1.00     Pack years: 0.00     Quit date: 1991     Years since quittin.1   • Smokeless tobacco: Never Used   • Tobacco comment: quit at age 30   Vaping Use   • Vaping Use: Never used   Substance and Sexual Activity   • Alcohol use: Yes     Comment: Rare   • Drug use: Yes     Types: Marijuana     Comment: Daily   • Sexual activity: Defer         Home Medications:  Dulaglutide, HYDROcodone-acetaminophen, alfuzosin, apixaban, buPROPion XL, citalopram, glucose blood, insulin aspart, insulin detemir, lisinopril, metFORMIN, metoprolol succinate XL, pregabalin, sertraline, silver sulfadiazine, simvastatin, and sodium hypochlorite    Allergies:  Allergies   Allergen Reactions   • Adhesive Tape Rash       Review of Systems   Constitutional: Positive for chills and fever. Negative for fatigue.   HENT: Negative for postnasal drip, sinus pain, sore throat and trouble swallowing.    Eyes: Negative for pain, discharge and itching.   Respiratory: Negative for cough, chest tightness, shortness of breath and wheezing.    Cardiovascular: Negative for chest pain, palpitations and leg swelling.   Gastrointestinal: Negative for abdominal pain, blood in stool, constipation, diarrhea, nausea and vomiting.   Genitourinary: Negative for difficulty urinating, dysuria, frequency, hematuria and urgency.   Skin: Positive for color change and wound. Negative for rash.   Neurological: Negative for dizziness, tremors, seizures, syncope, facial asymmetry, speech difficulty, weakness, light-headedness, numbness and headaches.   Psychiatric/Behavioral: Negative for agitation and confusion. The patient is not nervous/anxious and is not hyperactive.         Objective   Objective      Vitals:   Temp:  [98.2 °F (36.8 °C)] 98.2 °F (36.8 °C)  Heart Rate:  [102] 102  Resp:  [18] 18  BP: (126)/(69) 126/69    Physical Exam    Constitutional: Awake, alert, no acute distress   Eyes: Pupils equal, sclerae anicteric, no conjunctival injection   HENT: NCAT, mucous membranes moist   Neck: Supple, no thyromegaly, no lymphadenopathy, trachea midline   Respiratory: Clear to auscultation bilaterally, nonlabored respirations    Cardiovascular: RRR, no murmurs, rubs, or gallops, palpable pedal pulses bilaterally   Gastrointestinal: Positive bowel sounds, soft, nontender, nondistended   Musculoskeletal: No bilateral ankle edema, no clubbing or cyanosis to extremities   Psychiatric: Appropriate affect, cooperative   Neurologic: Oriented x 3, strength symmetric in all extremities, Cranial Nerves grossly intact to confrontation, speech clear   Skin: Midfoot amputation, redness elevated to his calf, tender to touch.  There is dressing on his left foot.  Left foot is not red.    Result Review    Result Review:  I have personally reviewed the results from the time of this admission to 10/1/2022 15:12 EDT and agree with these findings:  [x]  Laboratory  []  Microbiology  [x]  Radiology  []  EKG/Telemetry   []  Cardiology/Vascular   []  Pathology  []  Old records  []  Other:    Imaging Results (Last 24 Hours)     Procedure Component Value Units Date/Time    XR Foot 3+ View Right [[id number]] Collected: 10/01/22 1306     Updated: 10/01/22 1309    Narrative:      PROCEDURE: XR FOOT 3+ VW RIGHT     COMPARISON: [facility], ROLY, FOOT >OR= 3V RT, 11/05/2020, 8:53.  [facility], CR, XR FOOT 2 VW RIGHT, 12/02/2021, 21:35.     INDICATIONS: RIGHT FOOT PAIN     FINDINGS:      Prior transmetatarsal amputation of the right foot.  There is new cortical irregularity and   sclerosis in the remaining distal tarsals.  Stable linear metallic foreign body in the plantar   medial soft tissues.  There is mild  generalized soft tissue swelling.     IMPRESSION:  New cortical irregularity and sclerosis in the remaining distal right tarsal bones possibly   secondary to degenerative change or osteomyelitis.       FILIBERTO OWUSU MD         Electronically Signed and Approved By: FILIBERTO OWUSU MD on 10/01/2022 at 13:06                            Assessment & Plan   Assessment / Plan     Assessment  #Cellulitis versus osteomyelitis of right foot  #Lactic acidosis  #History of A. Fib  #History of hypertension  #History of diabetes      Plan:   -Admit to telemetry bed  -Continue Zosyn vancomycin  -Podiatry consult  -Continue Eliquis  -Continue metoprolol  -35 units of insulin with medium sliding scale  -Diabetic diet  -Full code  -Morning labs  -Procalcitonin  -Acetaminophen for pain  -Blood culture        DVT prophylaxis:  No DVT prophylaxis order currently exists.    CODE STATUS:         Admission Status:  I believe this patient meets inpatient status.    Estefany Donahue MD               Electronically signed by Estefany Donahue MD at 10/01/22 1520          Emergency Department Notes      Brown Vasquez MD at 10/01/22 1205          Time: 12:10 PM EDT  Arrived by: ambulance  Chief Complaint: Wound infection  History provided by: Patient  History is limited by: N/A     History of Present Illness:  Patient is a 61 y.o. year old male who presents to the emergency department with a wound infection on his left foot. Patient states that he has a history of diabetes. Patient reports having a fever. Patient denies vomiting, diarrhea.       History provided by:  Patient  Wound Infection  Location:  Left foot   Severity:  Mild  Onset quality:  Sudden  Duration:  3 days  Timing:  Constant  Progression:  Worsening  Relieved by:  Medications  Worsened by:  Palpation  Associated symptoms: fever    Associated symptoms: no abdominal pain, no chest pain, no congestion, no cough, no diarrhea, no headaches, no myalgias, no nausea, no  rhinorrhea, no shortness of breath, no sore throat and no vomiting        Similar Symptoms Previously: No  Recently seen: Yes      Patient Care Team  Primary Care Provider: Provider, No Known    Past Medical History:     Allergies   Allergen Reactions   • Adhesive Tape Rash     Past Medical History:   Diagnosis Date   • Absence of toe of right foot    • Acute osteomyelitis of left calcaneus  8/18/2021   • Anxiety and depression    • Arthritis    • Claustrophobia    • Corns and callus    • Diabetic ulcer of left heel associated with type 2 DM 8/18/2021   • Diabetic ulcer of left heel associated with type 2 DM 7/6/2021   • Diabetic ulcer of right midfoot associated with type 2 DM 8/18/2021   • Difficulty walking    • Essential hypertension 8/31/2021   • Hammertoe    • Hyperlipidemia LDL goal <100 8/31/2021   • Ingrown toenail    • Obesity    • Paroxysmal atrial fibrillation 8/31/2021   • Polyneuropathy    • Pressure ulcer, stage 1    • Tinea unguium    • Type 2 diabetes mellitus with polyneuropathy      Past Surgical History:   Procedure Laterality Date   • CYST REMOVAL      center of back; benign   • INCISION AND DRAINAGE ABSCESS      back   • INCISION AND DRAINAGE LEG Right 12/10/2021    Procedure: INCISION AND DRAINAGE LOWER EXTREMITY;  Surgeon: Ash Leyva DPM;  Location: [facility] OR;  Service: Podiatry;  Laterality: Right;   • OTHER SURGICAL HISTORY      Surgical clips left foot   • TOE SURGERY Right     Removal of 5th toe   • TRANS METATARSAL AMPUTATION Right 12/2/2021    Procedure: AMPUTATION TRANS METATARSAL;  Surgeon: Ash Leyva DPM;  Location: [facility] OR;  Service: Podiatry;  Laterality: Right;   • WRIST SURGERY Left     repair of injury     Family History   Problem Relation Age of Onset   • Heart disease Mother    • Heart disease Father    • Cancer Father         Unspecified   • Heart disease Brother        Home Medications:  Prior to Admission medications    Medication Sig  Start Date End Date Taking? Authorizing Provider   alfuzosin (UROXATRAL) 10 MG 24 hr tablet Take 10 mg by mouth Daily. 6/11/21   Laura Connor MD   apixaban (ELIQUIS) 5 MG tablet tablet Take 5 mg by mouth 2 (two) times a day.    Laura Connor MD   buPROPion XL (WELLBUTRIN XL) 300 MG 24 hr tablet Take 300 mg by mouth Every Morning.    Laura Connor MD   citalopram (CeleXA) 10 MG tablet Take 10 mg by mouth Every Night. 6/11/21   Laura Connor MD   glucose blood test strip Test blood sugar 3 times a day 9/23/21   Kami Garcia APRN   HYDROcodone-acetaminophen (Norco) 5-325 MG per tablet Take 1 tablet by mouth Every 4 (Four) Hours As Needed for Moderate Pain  for up to 18 doses. 12/21/21   Yung Grijalva MD   insulin aspart (NovoLOG) 100 UNIT/ML injection Inject 20 Units under the skin into the appropriate area as directed 3 (Three) Times a Day Before Meals. Take up to 20 units as instructed 9/29/21 9/29/22  Kami Garcia APRN   insulin detemir (LEVEMIR) 100 UNIT/ML injection Inject 50 Units under the skin into the appropriate area as directed Every Night. Take 40 units and adjust to 50 as instructed 9/23/21   Kami Garcia APRN   lisinopril (PRINIVIL,ZESTRIL) 20 MG tablet Take 20 mg by mouth Daily.    Laura Connor MD   metFORMIN (GLUCOPHAGE) 1000 MG tablet Take 1,000 mg by mouth 2 (two) times a day.    Laura Connor MD   metoprolol succinate XL (TOPROL-XL) 50 MG 24 hr tablet Take 50 mg by mouth Daily. 6/11/21   Laura Connor MD   pregabalin (LYRICA) 25 MG capsule Take 1 capsule by mouth Every 12 (Twelve) Hours for 15 days. 12/21/21 1/5/22  Yung Grijalva MD   sertraline (ZOLOFT) 100 MG tablet Take 100 mg by mouth Daily.    Laura Connor MD   silver sulfadiazine (Silvadene) 1 % cream Apply 1 application topically to the appropriate area as directed Daily. 6/29/22   Shea Daniels MD   simvastatin (ZOCOR) 20 MG tablet Take 20 mg by  "mouth Every Night.    Provider, MD Laura   sodium hypochlorite (DAKIN'S 1/4 STRENGTH) 0.125 % solution topical solution 0.125% Apply 10 mL topically to the appropriate area as directed 2 (Two) Times a Day. 22   Shea Daniels MD   Trulicity 1.5 MG/0.5ML solution pen-injector INJECT ONE SYRINGE UNDER THE SKIN ONCE WEEKLY **APPOINTMENT NEEDED FOR MORE REFILLS** 22   Kami Garcia APRN        Social History:   Social History     Tobacco Use   • Smoking status: Former Smoker     Packs/day: 0.00     Years: 1.00     Pack years: 0.00     Quit date: 1991     Years since quittin.1   • Smokeless tobacco: Never Used   • Tobacco comment: quit at age 30   Vaping Use   • Vaping Use: Never used   Substance Use Topics   • Alcohol use: Yes     Comment: Rare   • Drug use: Yes     Types: Marijuana     Comment: Daily         Review of Systems:  Review of Systems   Constitutional: Positive for fever. Negative for chills.   HENT: Negative for congestion, rhinorrhea and sore throat.    Eyes: Negative for pain and visual disturbance.   Respiratory: Negative for apnea, cough, chest tightness and shortness of breath.    Cardiovascular: Negative for chest pain and palpitations.   Gastrointestinal: Negative for abdominal pain, diarrhea, nausea and vomiting.   Genitourinary: Negative for difficulty urinating and dysuria.   Musculoskeletal: Negative for joint swelling and myalgias.   Skin: Positive for wound (Infected wound on left foot). Negative for color change.   Neurological: Negative for seizures and headaches.   Psychiatric/Behavioral: Negative.    All other systems reviewed and are negative.       Physical Exam:  /69 (BP Location: Left arm, Patient Position: Sitting)   Pulse 102   Temp 98.2 °F (36.8 °C) (Oral)   Resp 18   Ht 188 cm (74\")   Wt (!) 164 kg (360 lb 10.8 oz)   SpO2 100%   BMI 46.31 kg/m²     Physical Exam  Vitals and nursing note reviewed.   Constitutional:       General: He is " not in acute distress.     Appearance: Normal appearance. He is not toxic-appearing.   HENT:      Head: Normocephalic and atraumatic.      Jaw: There is normal jaw occlusion.   Eyes:      General: Lids are normal.      Extraocular Movements: Extraocular movements intact.      Conjunctiva/sclera: Conjunctivae normal.      Pupils: Pupils are equal, round, and reactive to light.   Cardiovascular:      Rate and Rhythm: Normal rate and regular rhythm.      Pulses: Normal pulses.      Heart sounds: Normal heart sounds.   Pulmonary:      Effort: Pulmonary effort is normal. No respiratory distress.      Breath sounds: Normal breath sounds. No wheezing or rhonchi.   Abdominal:      General: Abdomen is flat.      Palpations: Abdomen is soft.      Tenderness: There is no abdominal tenderness. There is no guarding or rebound.   Musculoskeletal:         General: Normal range of motion.      Cervical back: Normal range of motion and neck supple.      Right lower leg: No edema.      Left lower leg: No edema.      Comments: Lower extremity erythema   Feet:      Comments: Mid foot amputation with erythema and drainage; small area of wound dehiscence.   Skin:     General: Skin is warm and dry.   Neurological:      Mental Status: He is alert and oriented to person, place, and time. Mental status is at baseline.   Psychiatric:         Mood and Affect: Mood normal.                Medications in the Emergency Department:  Medications   vancomycin 3000 mg/500 mL 0.9% NS IVPB ([facility]) (3,000 mg Intravenous New Bag 10/1/22 1248)   piperacillin-tazobactam (ZOSYN) 3.375 g in iso-osmotic dextrose 50 ml (premix) (0 g Intravenous Stopped 10/1/22 1248)   sodium chloride 0.9 % bolus 1,000 mL (0 mL Intravenous Stopped 10/1/22 1336)        Labs  Lab Results (last 24 hours)     Procedure Component Value Units Date/Time    Lactic Acid, Plasma [[id number]]  (Abnormal) Collected: 10/01/22 1226    Specimen: Blood Updated: 10/01/22 1312     Lactate 2.1 mmol/L      Blood Culture - Blood, Arm, Left [[id number]] Collected: 10/01/22 1226    Specimen: Blood from Arm, Left Updated: 10/01/22 1232    Sedimentation Rate [[id number]]  (Abnormal) Collected: 10/01/22 1226    Specimen: Blood Updated: 10/01/22 1326     Sed Rate 35 mm/hr     C-reactive Protein [[id number]]  (Abnormal) Collected: 10/01/22 1226    Specimen: Blood Updated: 10/01/22 1259     C-Reactive Protein 13.61 mg/dL     Comprehensive Metabolic Panel [[id number]]  (Abnormal) Collected: 10/01/22 1335    Specimen: Blood Updated: 10/01/22 1417     Glucose 201 mg/dL      BUN 13 mg/dL      Creatinine 0.62 mg/dL      Sodium 132 mmol/L      Potassium 4.4 mmol/L      Comment: Specimen hemolyzed.  Results may be affected.        Chloride 102 mmol/L      CO2 23.2 mmol/L      Calcium 8.1 mg/dL      Total Protein 6.1 g/dL      Albumin 2.80 g/dL      ALT (SGPT) 44 U/L      Comment: Specimen hemolyzed.  Results may be affected.        AST (SGOT) 46 U/L      Comment: Specimen hemolyzed.  Results may be affected.        Alkaline Phosphatase 131 U/L      Total Bilirubin 2.2 mg/dL      Globulin 3.3 gm/dL      A/G Ratio 0.8 g/dL      BUN/Creatinine Ratio 21.0     Anion Gap 6.8 mmol/L      eGFR 107.4 mL/min/1.73      Comment: National Kidney Foundation and American Society of Nephrology (ASN) Task Force recommended calculation based on the Chronic Kidney Disease Epidemiology Collaboration (CKD-EPI) equation refit without adjustment for race.       Narrative:      GFR Normal >60  Chronic Kidney Disease <60  Kidney Failure <15      CBC & Differential [[id number]]  (Abnormal) Collected: 10/01/22 1342    Specimen: Blood Updated: 10/01/22 1416    Narrative:      The following orders were created for panel order CBC & Differential.  Procedure                               Abnormality         Status                     ---------                               -----------         ------                     CBC Auto Differential[[id number]]         Abnormal            Final result               Scan Slide[[id number]]                                                                    Please view results for these tests on the individual orders.    CBC Auto Differential [[id number]]  (Abnormal) Collected: 10/01/22 1342    Specimen: Blood Updated: 10/01/22 1416     WBC 9.70 10*3/mm3      RBC 4.26 10*6/mm3      Hemoglobin 11.4 g/dL      Hematocrit 34.8 %      MCV 81.7 fL      MCH 26.8 pg      MCHC 32.8 g/dL      RDW 16.0 %      RDW-SD 47.9 fl      MPV 12.3 fL      Platelets 69 10*3/mm3      Neutrophil % 82.9 %      Lymphocyte % 9.5 %      Monocyte % 6.6 %      Eosinophil % 0.2 %      Basophil % 0.2 %      Immature Grans % 0.6 %      Neutrophils, Absolute 8.04 10*3/mm3      Lymphocytes, Absolute 0.92 10*3/mm3      Monocytes, Absolute 0.64 10*3/mm3      Eosinophils, Absolute 0.02 10*3/mm3      Basophils, Absolute 0.02 10*3/mm3      Immature Grans, Absolute 0.06 10*3/mm3      nRBC 0.0 /100 WBC            Imaging:  XR Foot 3+ View Right    Result Date: 10/1/2022  PROCEDURE: XR FOOT 3+ VW RIGHT  COMPARISON: [facility], CR, FOOT >OR= 3V RT, 11/05/2020, 8:53.  [facility], CR, XR FOOT 2 VW RIGHT, 12/02/2021, 21:35.  INDICATIONS: RIGHT FOOT PAIN  FINDINGS:   Prior transmetatarsal amputation of the right foot.  There is new cortical irregularity and sclerosis in the remaining distal tarsals.  Stable linear metallic foreign body in the plantar medial soft tissues.  There is mild generalized soft tissue swelling.  IMPRESSION: New cortical irregularity and sclerosis in the remaining distal right tarsal bones possibly secondary to degenerative change or osteomyelitis.   FILIBERTO OWUSU MD       Electronically Signed and Approved By: FILIBERTO OWUSU MD on 10/01/2022 at 13:06               Procedures:  Procedures    Progress                            Medical Decision Making:  MDM  Number of Diagnoses or Management Options  Osteomyelitis, unspecified  site, unspecified type (HCC)  Diagnosis management comments: The patient´s CBC that was reviewed and interpreted by me shows no abnormalities of critical concern. Of note, there is no anemia requiring a blood transfusion and the platelet count is acceptable.  The patient´s CMP that was reviewed and interpretted by me shows no abnormalities of critical concern. Of note, the patient´s sodium and potassium are acceptable. The patient´s liver enzymes are unremarkable. The patient´s renal function (creatinine) is preserved. The patient has a normal anion gap.  Lactic acid is 2.1.  CRP is 13.  Sed rate is 35.  The patient was given vancomycin and Zosyn in the emergency department.  Case was discussed with Dr. Leyva who agrees with admission.  He does agree to consult.  The case was also discussed with Dr. Donahue who agrees to admit the patient.    Sepsis criteria was met in the emergency department and the Sepsis protocol (including antibiotic administration) was initiated.      SIRS criteria considered:   1.  Temperature > 100.4 or <98.6    2.  Heart Rate > 90    3.  Respiratory Rate > 22    4.  WBC > 12K or <4K.             Severe Sepsis:     Respiratory: Mechanical Ventilation or Bipap  Hypotension: SBP > 90 or MAP < 65  Renal: Creatinine > 2  Metabolic: Lactic Acid > 2  Hematologic: Platelets < 100K or INR > 1.5  Hepatic: BILI  >  2  CNS: Sudden AMS     Septic Shock:     Severe Sepsis + Persistent hypotension or Lactic Acid > 4     Normal saline bolus, Antibiotics, and final disposition was based on these definitions.        Sepsis was recognized at 1210    Antibiotics were ordered.     30 cc/kg bolus was not indicated.       Patient did not receive the recommended 30ml/kg fluid bolus for sepsis because it would be harmful or detrimental to the patient.      Total Critical Care time of 35 minutes. Total critical care time documented does not include time spent on separately billed procedures for services of nurses  or physician assistants. I personally saw and examined the patient. I have reviewed all diagnostic interpretations and treatment plans as written. I was present for the key portions of any procedures performed and the inclusive time noted in any critical care statement. Critical care time includes patient management by me, time spent at the patients bedside,  time to review lab and imaging results, discussing patient care, documentation in the medical record, and time spent with family or caregiver.       Amount and/or Complexity of Data Reviewed  Clinical lab tests: reviewed  Tests in the radiology section of CPT®: reviewed  Discussion of test results with the performing providers: yes  Discuss the patient with other providers: yes  Independent visualization of images, tracings, or specimens: yes    Risk of Complications, Morbidity, and/or Mortality  Presenting problems: moderate  Management options: moderate    Critical Care  Total time providing critical care: 30-74 minutes    Patient Progress  Patient progress: stable       Final diagnoses:   Osteomyelitis, unspecified site, unspecified type (HCC)        Disposition:  ED Disposition     ED Disposition   Decision to Admit    Condition   --    Comment   Level of Care: Telemetry [5]   Diagnosis: Osteomyelitis (HCC) [022387]   Certification: I Certify That Inpatient Hospital Services Are Medically Necessary For Greater Than 2 Midnights               Deena Barahona  10/01/22 1214       Deena Barahona  10/01/22 1218       Deena Barahona  10/01/22 1328       Brown Vasquez MD  10/01/22 1451      Electronically signed by Brown Vasquez MD at 10/01/22 1451       COWS (last day)     None          Lab Results (last 24 hours)     Procedure Component Value Units Date/Time    POC Glucose Once [[id number]]  (Abnormal) Collected: 10/01/22 2014    Specimen: Blood Updated: 10/01/22 2015     Glucose 221 mg/dL      Comment: Serial Number: [id number]:  [id number]        POC Glucose Once [[id number]]  (Abnormal) Collected: 10/01/22 1744    Specimen: Blood Updated: 10/01/22 1745     Glucose 167 mg/dL      Comment: Serial Number: [id number]:  [id number]       Hemoglobin A1c [[id number]] Collected: 10/01/22 1342    Specimen: Blood Updated: 10/01/22 1637    Comprehensive Metabolic Panel [[id number]]  (Abnormal) Collected: 10/01/22 1335    Specimen: Blood Updated: 10/01/22 1417     Glucose 201 mg/dL      BUN 13 mg/dL      Creatinine 0.62 mg/dL      Sodium 132 mmol/L      Potassium 4.4 mmol/L      Comment: Specimen hemolyzed.  Results may be affected.        Chloride 102 mmol/L      CO2 23.2 mmol/L      Calcium 8.1 mg/dL      Total Protein 6.1 g/dL      Albumin 2.80 g/dL      ALT (SGPT) 44 U/L      Comment: Specimen hemolyzed.  Results may be affected.        AST (SGOT) 46 U/L      Comment: Specimen hemolyzed.  Results may be affected.        Alkaline Phosphatase 131 U/L      Total Bilirubin 2.2 mg/dL      Globulin 3.3 gm/dL      A/G Ratio 0.8 g/dL      BUN/Creatinine Ratio 21.0     Anion Gap 6.8 mmol/L      eGFR 107.4 mL/min/1.73      Comment: National Kidney Foundation and American Society of Nephrology (ASN) Task Force recommended calculation based on the Chronic Kidney Disease Epidemiology Collaboration (CKD-EPI) equation refit without adjustment for race.       Narrative:      GFR Normal >60  Chronic Kidney Disease <60  Kidney Failure <15      CBC & Differential [[id number]]  (Abnormal) Collected: 10/01/22 1342    Specimen: Blood Updated: 10/01/22 1416    Narrative:      The following orders were created for panel order CBC & Differential.  Procedure                               Abnormality         Status                     ---------                               -----------         ------                     CBC Auto Differential[[id number]]        Abnormal            Final result               Scan Slide[[id number]]                                                                     Please view results for these tests on the individual orders.    CBC Auto Differential [[id number]]  (Abnormal) Collected: 10/01/22 1342    Specimen: Blood Updated: 10/01/22 1416     WBC 9.70 10*3/mm3      RBC 4.26 10*6/mm3      Hemoglobin 11.4 g/dL      Hematocrit 34.8 %      MCV 81.7 fL      MCH 26.8 pg      MCHC 32.8 g/dL      RDW 16.0 %      RDW-SD 47.9 fl      MPV 12.3 fL      Platelets 69 10*3/mm3      Neutrophil % 82.9 %      Lymphocyte % 9.5 %      Monocyte % 6.6 %      Eosinophil % 0.2 %      Basophil % 0.2 %      Immature Grans % 0.6 %      Neutrophils, Absolute 8.04 10*3/mm3      Lymphocytes, Absolute 0.92 10*3/mm3      Monocytes, Absolute 0.64 10*3/mm3      Eosinophils, Absolute 0.02 10*3/mm3      Basophils, Absolute 0.02 10*3/mm3      Immature Grans, Absolute 0.06 10*3/mm3      nRBC 0.0 /100 WBC     Sedimentation Rate [[id number]]  (Abnormal) Collected: 10/01/22 1226    Specimen: Blood Updated: 10/01/22 1326     Sed Rate 35 mm/hr     Lactic Acid, Plasma [[id number]]  (Abnormal) Collected: 10/01/22 1226    Specimen: Blood Updated: 10/01/22 1312     Lactate 2.1 mmol/L     C-reactive Protein [[id number]]  (Abnormal) Collected: 10/01/22 1226    Specimen: Blood Updated: 10/01/22 1259     C-Reactive Protein 13.61 mg/dL     Blood Culture - Blood, Arm, Left [[id number]] Collected: 10/01/22 1226    Specimen: Blood from Arm, Left Updated: 10/01/22 1232        Imaging Results (Last 24 Hours)     Procedure Component Value Units Date/Time    XR Foot 3+ View Right [[id number]] Collected: 10/01/22 1306     Updated: 10/01/22 1309    Narrative:      PROCEDURE: XR FOOT 3+ VW RIGHT     COMPARISON: [facility], CR, FOOT >OR= 3V RT, 11/05/2020, 8:53.  [facility], CR, XR FOOT 2 VW RIGHT, 12/02/2021, 21:35.     INDICATIONS: RIGHT FOOT PAIN     FINDINGS:      Prior transmetatarsal amputation of the right foot.  There is new cortical irregularity and   sclerosis in the remaining distal tarsals.   Stable linear metallic foreign body in the plantar   medial soft tissues.  There is mild generalized soft tissue swelling.     IMPRESSION:  New cortical irregularity and sclerosis in the remaining distal right tarsal bones possibly   secondary to degenerative change or osteomyelitis.       FILIBERTO OWUSU MD         Electronically Signed and Approved By: FILIBERTO OWUSU MD on 10/01/2022 at 13:06                         Orders (last 24 hrs)      Start     Ordered    10/02/22 0600  CBC Auto Differential  Morning Draw         10/01/22 1630    10/02/22 0600  Comprehensive Metabolic Panel  Morning Draw         10/01/22 1630    10/02/22 0600  Magnesium  Morning Draw         10/01/22 1630    10/02/22 0600  Phosphorus  Morning Draw         10/01/22 1630    10/02/22 0600  Procalcitonin  Morning Draw         10/01/22 1630    10/02/22 0600  buPROPion XL (WELLBUTRIN XL) 24 hr tablet 300 mg  Every Early Morning         10/01/22 1630    10/02/22 0100  vancomycin 1500 mg/250 mL 0.9% NS IVPB ([facility])  Every 12 Hours         10/01/22 1705    10/01/22 2100  sodium chloride 0.9 % flush 10 mL  Every 12 Hours Scheduled         10/01/22 1630    10/01/22 2100  insulin detemir (LEVEMIR) injection 35 Units  Nightly         10/01/22 1630    10/01/22 2100  apixaban (ELIQUIS) tablet 5 mg  Every 12 Hours Scheduled         10/01/22 1630    10/01/22 2100  citalopram (CeleXA) tablet 10 mg  Nightly         10/01/22 1630    10/01/22 2100  atorvastatin (LIPITOR) tablet 10 mg  Nightly         10/01/22 1630    10/01/22 2028  calcium carbonate (TUMS) chewable tablet 500 mg (200 mg elemental)  3 Times Daily PRN         10/01/22 2029    10/01/22 2028  PT Consult: Eval & Treat Functional Mobility Below Baseline  Once         10/01/22 2027    10/01/22 2023  aluminum-magnesium hydroxide-simethicone (MAALOX MAX) 400-400-40 MG/5ML suspension 15 mL  Every 6 Hours PRN,   Status:  Discontinued         10/01/22 2023    10/01/22 2016  POC Glucose Once  PROCEDURE  ONCE         10/01/22 2014    10/01/22 2000  Vital Signs  Every 4 Hours       10/01/22 1630    10/01/22 2000  POC Glucose NIGHTLY 8PM  Nightly      Comments: Additional nightly glucose check for patients on basal insulin. If bedtime blood glucose is greater than 350 mg/dl, call MD.      10/01/22 1630    10/01/22 2000  piperacillin-tazobactam (ZOSYN) 4.5 g/100 mL 0.9% NS IVPB ([facility])  Every 8 Hours         10/01/22 1705    10/01/22 1800  Oral Care  2 Times Daily       10/01/22 1630    10/01/22 1749  Inpatient Case Management  Consult  Once        Provider:  (Not yet assigned)    10/01/22 1749    10/01/22 1748  Wound Ostomy Eval & Treat  Once         10/01/22 1748    10/01/22 1746  POC Glucose Once  PROCEDURE ONCE         10/01/22 1744    10/01/22 1730  Insulin Lispro (humaLOG) injection 0-9 Units  3 Times Daily Before Meals         10/01/22 1630    10/01/22 1730  lisinopril (PRINIVIL,ZESTRIL) tablet 20 mg  Daily         10/01/22 1630    10/01/22 1730  metoprolol succinate XL (TOPROL-XL) 24 hr tablet 50 mg  Daily         10/01/22 1630    10/01/22 1730  sertraline (ZOLOFT) tablet 100 mg  Daily         10/01/22 1630    10/01/22 1700  POC Glucose TID AC  3 Times Daily Before Meals       10/01/22 1630    10/01/22 1647  Inpatient Consult to Advance Care Planning  Once        Provider:  (Not yet assigned)    10/01/22 1747    10/01/22 1631  Telemetry - Maintain IV Access  Continuous,   Status:  Canceled         10/01/22 1630    10/01/22 1631  Continuous Cardiac Monitoring  Continuous,   Status:  Canceled         10/01/22 1630    10/01/22 1631  Notify Provider if ACLS Protocol Activated  Until Discontinued         10/01/22 1630    10/01/22 1631  Intake & Output  Every Shift       10/01/22 1630    10/01/22 1631  Weigh Patient  Once         10/01/22 1630    10/01/22 1631  Oxygen Therapy- Nasal Cannula; Titrate for SPO2: 90% - 95%  Continuous         10/01/22 1630    10/01/22 1631  Insert Peripheral IV  Once          10/01/22 1630    10/01/22 1631  Saline Lock & Maintain IV Access  Continuous         10/01/22 1630    10/01/22 1631  Code Status and Medical Interventions:  Continuous         10/01/22 1630    10/01/22 1631  VTE Prophylaxis Not Indicated: Other: Patient Currently Anticoagulated / Receiving Prophylaxis  Once         10/01/22 1630    10/01/22 1631  Cardiac Monitoring  Continuous         10/01/22 1630    10/01/22 1631  Pulse Oximetry, Continuous  Continuous         10/01/22 1630    10/01/22 1631  Diet Regular; Consistent Carbohydrate  Diet Effective Now         10/01/22 1630    10/01/22 1631  Do NOT Hold Basal or Correction Scale Insulin When Patient is NPO, Hold Scheduled Mealtime (Bolus) Insulin if NPO  Continuous         10/01/22 1630    10/01/22 1631  Hemoglobin A1c  Once         10/01/22 1630    10/01/22 1630  Pharmacy to dose vancomycin  Continuous PRN         10/01/22 1630    10/01/22 1630  Pharmacy to Dose Zosyn  Continuous PRN         10/01/22 1630    10/01/22 1630  acetaminophen (TYLENOL) tablet 650 mg  Every 6 Hours PRN         10/01/22 1630    10/01/22 1630  dextrose (GLUTOSE) oral gel 15 g  Every 15 Minutes PRN         10/01/22 1630    10/01/22 1630  dextrose (D50W) (25 g/50 mL) IV injection 25 g  Every 15 Minutes PRN         10/01/22 1630    10/01/22 1630  glucagon (human recombinant) (GLUCAGEN DIAGNOSTIC) injection 1 mg  Every 15 Minutes PRN         10/01/22 1630    10/01/22 1630  Telemetry - Pulse Oximetry  Continuous PRN,   Status:  Canceled      Comments: If Patient Develops Unresponsiveness, Acute Dyspnea, Cyanosis or Suspected Hypoxemia Start Continuous Pulse Ox Monitoring, Apply Oxygen & Notify Provider    10/01/22 1630    10/01/22 1630  Oxygen Therapy- Nasal Cannula; Titrate for SPO2: 90% - 95%  Continuous PRN,   Status:  Canceled      Comments: If Patient Develops Unresponsiveness, Acute Dyspnea, Cyanosis or Suspected Hypoxemia Start Continuous Pulse Ox Monitoring, Apply Oxygen & Notify Provider  "   10/01/22 1630    10/01/22 1630  nitroglycerin (NITROSTAT) SL tablet 0.4 mg  Every 5 Minutes PRN         10/01/22 1630    10/01/22 1630  sodium chloride 0.9 % flush 10 mL  As Needed         10/01/22 1630    10/01/22 1630  sodium chloride 0.9 % infusion 40 mL  As Needed         10/01/22 1630    10/01/22 1526  STAT Lactic Acid, Reflex  PROCEDURE ONCE         10/01/22 1312    10/01/22 1448  Inpatient Admission  Once         10/01/22 1447    10/01/22 1448  Cardiac Monitoring  Continuous,   Status:  Canceled         10/01/22 1447    10/01/22 1420  Inpatient Hospitalist Consult  Once        Specialty:  Hospitalist  Provider:  Estefany Donahue MD    10/01/22 1419    10/01/22 1350  Scan Slide  Once,   Status:  Canceled         10/01/22 1349    10/01/22 1329  CBC Auto Differential  PROCEDURE ONCE         10/01/22 1214    10/01/22 1326  General MD Inpatient Consult  Once        Provider:  Ash Leyva DPM    10/01/22 1326    10/01/22 1315  Comprehensive Metabolic Panel  STAT         10/01/22 1214    10/01/22 1237  Scan Slide  Once,   Status:  Canceled         10/01/22 1236    10/01/22 1230  vancomycin 3000 mg/500 mL 0.9% NS IVPB ([facility])  Once         10/01/22 1214    10/01/22 1230  piperacillin-tazobactam (ZOSYN) 3.375 g in iso-osmotic dextrose 50 ml (premix)  Once         10/01/22 1214    10/01/22 1230  sodium chloride 0.9 % bolus 1,000 mL  Once         10/01/22 1214    10/01/22 1214  CBC & Differential  STAT         10/01/22 1214    10/01/22 1214  Lactic Acid, Plasma  STAT         10/01/22 1214    10/01/22 1214  Blood Culture - Blood,  Once        \"And\" Linked Group Details    10/01/22 1214    10/01/22 1214  Blood Culture - Blood, Arm, Left  Once        Comments: From a different site than #1.     \"And\" Linked Group Details    10/01/22 1214    10/01/22 1214  XR Foot 3+ View Right  1 Time Imaging         10/01/22 1214    10/01/22 1214  Sedimentation Rate  Once         10/01/22 1214    10/01/22 1214  C-reactive " Protein  STAT         10/01/22 1214    Unscheduled  ECG 12 Lead  As Needed      Comments: Nurse to Release if Patient Expericences Acute Chest Pain or Dysrhythmias    10/01/22 1630    Unscheduled  Potassium  As Needed      Comments: For Ventricular Arrhythmias      10/01/22 1630    Unscheduled  Magnesium  As Needed      Comments: For Ventricular Arrhythmias      10/01/22 1630    Unscheduled  Troponin  As Needed      Comments: For Chest Pain      10/01/22 1630    Unscheduled  Blood Gas, Arterial -With Co-Ox Panel: Yes  As Needed      Comments: Draw for Acute Dyspnea, Cyanosis, Suspected Hypoxemia or UnresponsivenessNotify Provider of Results      10/01/22 1630    Unscheduled  ACLS Protocol For Life Threatening Dysrhythmias (Unless Code Status Indicates Otherwise)  As Needed       10/01/22 1630    Unscheduled  Wound Care  As Needed       10/01/22 1630    Unscheduled  Follow Hypoglycemia Standing Orders (Blood Glucose <70)  As Needed      Comments: Follow Protocol As Outlined in Process Instructions (Open Order Report to View Full Instructions)    10/01/22 1630

## 2022-10-03 ENCOUNTER — APPOINTMENT (OUTPATIENT)
Dept: CT IMAGING | Facility: HOSPITAL | Age: 62
End: 2022-10-03

## 2022-10-03 LAB
ANION GAP SERPL CALCULATED.3IONS-SCNC: 5.3 MMOL/L (ref 5–15)
BASOPHILS # BLD AUTO: 0.02 10*3/MM3 (ref 0–0.2)
BASOPHILS NFR BLD AUTO: 0.4 % (ref 0–1.5)
BUN SERPL-MCNC: 13 MG/DL (ref 8–23)
BUN/CREAT SERPL: 19.7 (ref 7–25)
CALCIUM SPEC-SCNC: 8.1 MG/DL (ref 8.6–10.5)
CHLORIDE SERPL-SCNC: 104 MMOL/L (ref 98–107)
CO2 SERPL-SCNC: 27.7 MMOL/L (ref 22–29)
CREAT SERPL-MCNC: 0.66 MG/DL (ref 0.76–1.27)
DEPRECATED RDW RBC AUTO: 50.1 FL (ref 37–54)
EGFRCR SERPLBLD CKD-EPI 2021: 105.4 ML/MIN/1.73
EOSINOPHIL # BLD AUTO: 0.12 10*3/MM3 (ref 0–0.4)
EOSINOPHIL NFR BLD AUTO: 2.5 % (ref 0.3–6.2)
ERYTHROCYTE [DISTWIDTH] IN BLOOD BY AUTOMATED COUNT: 16.2 % (ref 12.3–15.4)
GLUCOSE BLDC GLUCOMTR-MCNC: 135 MG/DL (ref 70–99)
GLUCOSE BLDC GLUCOMTR-MCNC: 156 MG/DL (ref 70–99)
GLUCOSE BLDC GLUCOMTR-MCNC: 156 MG/DL (ref 70–99)
GLUCOSE BLDC GLUCOMTR-MCNC: 214 MG/DL (ref 70–99)
GLUCOSE SERPL-MCNC: 152 MG/DL (ref 65–99)
HCT VFR BLD AUTO: 36 % (ref 37.5–51)
HGB BLD-MCNC: 11.4 G/DL (ref 13–17.7)
IMM GRANULOCYTES # BLD AUTO: 0.03 10*3/MM3 (ref 0–0.05)
IMM GRANULOCYTES NFR BLD AUTO: 0.6 % (ref 0–0.5)
LYMPHOCYTES # BLD AUTO: 0.83 10*3/MM3 (ref 0.7–3.1)
LYMPHOCYTES NFR BLD AUTO: 17.4 % (ref 19.6–45.3)
MCH RBC QN AUTO: 26.7 PG (ref 26.6–33)
MCHC RBC AUTO-ENTMCNC: 31.7 G/DL (ref 31.5–35.7)
MCV RBC AUTO: 84.3 FL (ref 79–97)
MONOCYTES # BLD AUTO: 0.48 10*3/MM3 (ref 0.1–0.9)
MONOCYTES NFR BLD AUTO: 10 % (ref 5–12)
MRSA DNA SPEC QL NAA+PROBE: NORMAL
NEUTROPHILS NFR BLD AUTO: 3.3 10*3/MM3 (ref 1.7–7)
NEUTROPHILS NFR BLD AUTO: 69.1 % (ref 42.7–76)
NRBC BLD AUTO-RTO: 0 /100 WBC (ref 0–0.2)
PHOSPHATE SERPL-MCNC: 2.6 MG/DL (ref 2.5–4.5)
PLATELET # BLD AUTO: 86 10*3/MM3 (ref 140–450)
PMV BLD AUTO: 11.4 FL (ref 6–12)
POTASSIUM SERPL-SCNC: 4.1 MMOL/L (ref 3.5–5.2)
RBC # BLD AUTO: 4.27 10*6/MM3 (ref 4.14–5.8)
SODIUM SERPL-SCNC: 137 MMOL/L (ref 136–145)
VANCOMYCIN SERPL-MCNC: 20.48 MCG/ML (ref 5–40)
WBC NRBC COR # BLD: 4.78 10*3/MM3 (ref 3.4–10.8)

## 2022-10-03 PROCEDURE — 94799 UNLISTED PULMONARY SVC/PX: CPT

## 2022-10-03 PROCEDURE — 63710000001 INSULIN DETEMIR PER 5 UNITS: Performed by: STUDENT IN AN ORGANIZED HEALTH CARE EDUCATION/TRAINING PROGRAM

## 2022-10-03 PROCEDURE — 80048 BASIC METABOLIC PNL TOTAL CA: CPT | Performed by: INTERNAL MEDICINE

## 2022-10-03 PROCEDURE — 25010000002 VANCOMYCIN 5 G RECONSTITUTED SOLUTION: Performed by: STUDENT IN AN ORGANIZED HEALTH CARE EDUCATION/TRAINING PROGRAM

## 2022-10-03 PROCEDURE — 82962 GLUCOSE BLOOD TEST: CPT

## 2022-10-03 PROCEDURE — 25010000002 ONDANSETRON PER 1 MG: Performed by: INTERNAL MEDICINE

## 2022-10-03 PROCEDURE — 99222 1ST HOSP IP/OBS MODERATE 55: CPT | Performed by: SURGERY

## 2022-10-03 PROCEDURE — 25010000002 PROCHLORPERAZINE 10 MG/2ML SOLUTION: Performed by: INTERNAL MEDICINE

## 2022-10-03 PROCEDURE — 0 IOPAMIDOL PER 1 ML: Performed by: INTERNAL MEDICINE

## 2022-10-03 PROCEDURE — 84100 ASSAY OF PHOSPHORUS: CPT | Performed by: INTERNAL MEDICINE

## 2022-10-03 PROCEDURE — 80202 ASSAY OF VANCOMYCIN: CPT | Performed by: INTERNAL MEDICINE

## 2022-10-03 PROCEDURE — 99233 SBSQ HOSP IP/OBS HIGH 50: CPT | Performed by: INTERNAL MEDICINE

## 2022-10-03 PROCEDURE — 63710000001 INSULIN LISPRO (HUMAN) PER 5 UNITS: Performed by: STUDENT IN AN ORGANIZED HEALTH CARE EDUCATION/TRAINING PROGRAM

## 2022-10-03 PROCEDURE — 87641 MR-STAPH DNA AMP PROBE: CPT | Performed by: INTERNAL MEDICINE

## 2022-10-03 PROCEDURE — 85025 COMPLETE CBC W/AUTO DIFF WBC: CPT | Performed by: INTERNAL MEDICINE

## 2022-10-03 PROCEDURE — 25010000002 PIPERACILLIN SOD-TAZOBACTAM PER 1 G: Performed by: STUDENT IN AN ORGANIZED HEALTH CARE EDUCATION/TRAINING PROGRAM

## 2022-10-03 PROCEDURE — 75635 CT ANGIO ABDOMINAL ARTERIES: CPT

## 2022-10-03 RX ADMIN — VANCOMYCIN HYDROCHLORIDE 1500 MG: 5 INJECTION, POWDER, LYOPHILIZED, FOR SOLUTION INTRAVENOUS at 01:56

## 2022-10-03 RX ADMIN — PROCHLORPERAZINE EDISYLATE 5 MG: 5 INJECTION INTRAMUSCULAR; INTRAVENOUS at 20:23

## 2022-10-03 RX ADMIN — Medication 250 MG: at 20:13

## 2022-10-03 RX ADMIN — METOPROLOL SUCCINATE 50 MG: 50 TABLET, EXTENDED RELEASE ORAL at 09:17

## 2022-10-03 RX ADMIN — PIPERACILLIN SODIUM AND TAZOBACTAM SODIUM 4.5 G: 4; 500 INJECTION, POWDER, FOR SOLUTION INTRAVENOUS at 13:48

## 2022-10-03 RX ADMIN — PIPERACILLIN SODIUM AND TAZOBACTAM SODIUM 4.5 G: 4; 500 INJECTION, POWDER, FOR SOLUTION INTRAVENOUS at 20:13

## 2022-10-03 RX ADMIN — SERTRALINE HYDROCHLORIDE 100 MG: 100 TABLET ORAL at 09:17

## 2022-10-03 RX ADMIN — LISINOPRIL 20 MG: 20 TABLET ORAL at 09:17

## 2022-10-03 RX ADMIN — ATORVASTATIN CALCIUM 10 MG: 10 TABLET, FILM COATED ORAL at 20:13

## 2022-10-03 RX ADMIN — DAKIN'S SOLUTION 0.125% (QUARTER STRENGTH): 0.12 SOLUTION at 11:55

## 2022-10-03 RX ADMIN — DAKIN'S SOLUTION 0.125% (QUARTER STRENGTH): 0.12 SOLUTION at 20:13

## 2022-10-03 RX ADMIN — INSULIN DETEMIR 35 UNITS: 100 INJECTION, SOLUTION SUBCUTANEOUS at 20:13

## 2022-10-03 RX ADMIN — VANCOMYCIN HYDROCHLORIDE 1750 MG: 5 INJECTION, POWDER, LYOPHILIZED, FOR SOLUTION INTRAVENOUS at 11:32

## 2022-10-03 RX ADMIN — INSULIN LISPRO 2 UNITS: 100 INJECTION, SOLUTION INTRAVENOUS; SUBCUTANEOUS at 17:48

## 2022-10-03 RX ADMIN — Medication 10 ML: at 20:13

## 2022-10-03 RX ADMIN — IOPAMIDOL 100 ML: 755 INJECTION, SOLUTION INTRAVENOUS at 22:10

## 2022-10-03 RX ADMIN — CALCIUM CARBONATE 2 TABLET: 500 TABLET, CHEWABLE ORAL at 13:52

## 2022-10-03 RX ADMIN — ACETAMINOPHEN 650 MG: 325 TABLET ORAL at 09:17

## 2022-10-03 RX ADMIN — CITALOPRAM HYDROBROMIDE 10 MG: 20 TABLET ORAL at 20:13

## 2022-10-03 RX ADMIN — PROCHLORPERAZINE EDISYLATE 5 MG: 5 INJECTION INTRAMUSCULAR; INTRAVENOUS at 10:18

## 2022-10-03 RX ADMIN — TAMSULOSIN HYDROCHLORIDE 0.4 MG: 0.4 CAPSULE ORAL at 20:13

## 2022-10-03 RX ADMIN — APIXABAN 5 MG: 5 TABLET, FILM COATED ORAL at 20:13

## 2022-10-03 RX ADMIN — PIPERACILLIN SODIUM AND TAZOBACTAM SODIUM 4.5 G: 4; 500 INJECTION, POWDER, FOR SOLUTION INTRAVENOUS at 05:41

## 2022-10-03 RX ADMIN — INSULIN LISPRO 4 UNITS: 100 INJECTION, SOLUTION INTRAVENOUS; SUBCUTANEOUS at 11:33

## 2022-10-03 RX ADMIN — VANCOMYCIN HYDROCHLORIDE 1750 MG: 5 INJECTION, POWDER, LYOPHILIZED, FOR SOLUTION INTRAVENOUS at 23:49

## 2022-10-03 RX ADMIN — APIXABAN 5 MG: 5 TABLET, FILM COATED ORAL at 09:17

## 2022-10-03 RX ADMIN — ONDANSETRON 4 MG: 2 INJECTION INTRAMUSCULAR; INTRAVENOUS at 17:48

## 2022-10-03 RX ADMIN — Medication 250 MG: at 09:17

## 2022-10-03 NOTE — PROGRESS NOTES
[facility]   Hospitalist Progress Note    Date of admission: 10/1/2022  Patient Name: Jose Shaikh  1960  Date: 10/3/2022      Subjective     Chief Complaint   Patient presents with   • Wound Infection       Summary: 61 y.o. M with IDDM2, pafib on eliquis, and chronic diabetic foot ulcers/wounds, multiple podiatry surgeries, follows chronically with wound clinic, morbid obesity and baseline debility who presents worsening pain in his bilateral feet.  Patient notes he walks on his right stump site to and from the bathroom in his house because his wheelchair can fit into the bathroom     Osteomyelitis on MRI, concern will need BKA, vascular surgery assisting, CTA pending and will base bka need based on additional testing.    Interval Followup: occasional foot pain, no fevers or chills.  Discussed plan of care    Review of Systems  No chest pain or palpitations  No fevers or chills    Objective     Vitals:   Temp:  [97.8 °F (36.6 °C)-98.5 °F (36.9 °C)] 98.5 °F (36.9 °C)  Heart Rate:  [68-89] 89  Resp:  [18] 18  BP: (100-131)/(47-79) 127/69    Physical Exam  Gen: awake, resting in bed, conversant  HENT: NCAT, mmm  Resp: ctab, no wrr  CV: RRR, trace LE pitting edema  GI: Abdomen soft, NT, ND, no guarding, +BS  Psych: appropriate mood and affect, aox3  Skin: warm, dry, right foot stump site dressed, some prior bleeding on bandage noted, left foot wound dressed as well    Result Review:  Vital signs, labs and recent relevant imaging reviewed.      apixaban, 5 mg, Oral, Q12H  atorvastatin, 10 mg, Oral, Nightly  buPROPion XL, 300 mg, Oral, Q AM  citalopram, 10 mg, Oral, Nightly  insulin detemir, 35 Units, Subcutaneous, Nightly  insulin lispro, 0-9 Units, Subcutaneous, TID AC  lisinopril, 20 mg, Oral, Daily  metoprolol succinate XL, 50 mg, Oral, Daily  piperacillin-tazobactam, 4.5 g, Intravenous, Q8H  saccharomyces boulardii, 250 mg, Oral, BID  sertraline, 100 mg, Oral, Daily  sodium chloride, 10 mL, Intravenous,  Q12H  Sodium Hypochlorite, , Topical, Q12H  tamsulosin, 0.4 mg, Oral, Nightly  vancomycin, 1,750 mg, Intravenous, Q12H        •  acetaminophen  •  calcium carbonate  •  dextrose  •  dextrose  •  glucagon (human recombinant)  •  influenza vaccine  •  nitroglycerin  •  ondansetron  •  Pharmacy to dose vancomycin  •  Pharmacy to Dose Zosyn  •  prochlorperazine  •  sodium chloride  •  sodium chloride      Assessment / Plan     Assessment/Plan:  Right diabetic foot infection, cellulitis and osteomyelitis of the middle cuneiform bone and navicular bone.  There is also less pronounced involvement of the   residual osseous stump of the inner most cuneiform bone.  pAfib on eliquis  Thrombocytopenia, chronic   IDDM2 a1c 7.3   Chronic diabetic foot ulcers/wounds,   HTN  BPH  Morbid obesity   Depression  HLD  Hypophosphatemia     -mri foot with osteo findings, esr and crp elevated  -d/w dr mireles - suspects may require bka  -d/w dr salinas - checking cta to evaluate for possible revascularization vs bka  -f/u cultures  -cont vanc/zosyn for now , florastor while on abx, monitor renal fxn and vanc levels, reasonable thus far   -levemir, ssi, a1c 7.3, poc gluc monitoring, reasonable control overall   -wound care  -pt/ot  -cont bp meds   -cont psych meds  -cont flomax, monitor for retention  -pt may benefit from additional dme at home     dispo pending cta imaging and further surgical eval.  Would strongly benefit from rehab if approved/able.       DVT prophylaxis:  Medical DVT prophylaxis orders are present.    Level Of Support Discussed With: Patient  Code Status (Patient has no pulse and is not breathing): CPR (Attempt to Resuscitate)  Medical Interventions (Patient has pulse or is breathing): Full Support        CBC    CBC 10/1/22 10/2/22 10/3/22   WBC 9.70 7.97 4.78   RBC 4.26 4.26 4.27   Hemoglobin 11.4 (A) 11.7 (A) 11.4 (A)   Hematocrit 34.8 (A) 34.9 (A) 36.0 (A)   MCV 81.7 81.9 84.3   MCH 26.8 27.5 26.7   MCHC 32.8 33.5 31.7    RDW 16.0 (A) 16.1 (A) 16.2 (A)   Platelets 69 (A) 73 (A) 86 (A)   (A) Abnormal value              CMP    CMP 10/1/22 10/2/22 10/3/22   Glucose 201 (A) 202 (A) 152 (A)   BUN 13 14 13   Creatinine 0.62 (A) 0.62 (A) 0.66 (A)   Sodium 132 (A) 135 (A) 137   Potassium 4.4 3.8 4.1   Chloride 102 102 104   Calcium 8.1 (A) 8.3 (A) 8.1 (A)   Albumin 2.80 (A) 3.10 (A)    Total Bilirubin 2.2 (A) 2.1 (A)    Alkaline Phosphatase 131 (A) 134 (A)    AST (SGOT) 46 (A) 28    ALT (SGPT) 44 (A) 38    (A) Abnormal value       Comments are available for some flowsheets but are not being displayed.             Dispo: pending stability in clinical condition and appropriate discharge location.

## 2022-10-03 NOTE — PLAN OF CARE
Goal Outcome Evaluation:      No significant changes overnight. VSS. Wound care completed per orders. Medicated x1 for pain with tylenol. Awaiting vascular consult to determine further POC. Divine Patino RN

## 2022-10-03 NOTE — PLAN OF CARE
Goal Outcome Evaluation:  Plan of Care Reviewed With: patient        Progress: improving    VSS.  AAOx4.  Vascular consulted today.  Will obtain CT this evening to help determine further intervention planning.  Patient has had minimal pain.  Nauseated at times but improved with antiemetics.  Continue to monitor.    Qasim GARCIAN, RN

## 2022-10-03 NOTE — PROGRESS NOTES
"Pharmacy to Dose Vancomycin Day: 3    Jose Shaikh is a 61 y.o.male admitted with a skin and soft tissue infection. Pharmacy has been consulted to dose IV Vancomycin     Consulting Provider: Dr. Donahue  Clinical Indication: SSTI  osteomyelitis  Goal -600 mg/L.hr  Duration of therapy: 5 days per consult (through 10/6)    188 cm (74\")       10/01/22  1141      Weight: (!) 164 kg (360 lb 10.8 oz)         Estimated Creatinine Clearance: 186.3 mL/min (A) (by C-G formula based on SCr of 0.66 mg/dL (L)).  Results from last 7 days   Lab Units 10/03/22  0604 10/02/22  0450 10/01/22  1335   BUN mg/dL 13 14 13   CREATININE mg/dL 0.66* 0.62* 0.62*     HD/PD/CRRT?: no    Lab Results   Component Value Date    WBC 4.78 10/03/2022      Temperature    10/02/22 2012 10/03/22 0003 10/03/22 0429   Temp: 98.4 °F (36.9 °C) 98.1 °F (36.7 °C) 97.8 °F (36.6 °C)      Contrast Administered: Yes, Multihance on 10/2    Relevant Micro:   Microbiology Results (last 10 days)       Procedure Component Value - Date/Time    Blood Culture - Blood, Arm, Left [[id number]]  (Normal) Collected: 10/01/22 1226    Lab Status: Preliminary result Specimen: Blood from Arm, Left Updated: 10/02/22 1247     Blood Culture No growth at 24 hours          Imaging:   10/3 MRI Foot Right with and without contrast:   1. There is a wound or ulceration involving the soft tissues at the site of the surgical stump of   the right midfoot.  There is edema and enhancement throughout the soft tissues of the surgical   stump suggesting changes of soft tissue cellulitis.  No well-defined fluid collection is seen to   suggest abscess.  2. Findings indicating changes of osteomyelitis involving the underlying surgical osseous stump of   the middle cuneiform bone and navicular bone.  There is also less pronounced involvement of the   residual osseous stump of the inner most cuneiform bone.  3. Susceptibility artifact is noted at the medial aspect of the hindfoot corresponding " to the   curvilinear metallic foreign body in the soft tissues in this region on the radiographs.    10/1 right foot x-ray: New cortical irregularity and sclerosis in the remaining distal right tarsal bones possibly secondary to degenerative change or osteomyelitis.    Other Antimicrobial Therapy: Zosyn    Assessment/Plan  Loading dose: 3000mg IV administered 10/1@1248  Current Regimen: 1500mg IV q12h    Vancomycin random level obtained with AM labs 10/3@0604 resulted at 20.48mcg/ml (approximated 4 hours after last dose).  Current regimen of vancomycin 1500mg IV q12h is allowing for estimated AUC of 423 with PAUC of 63%.  Due to parameters being on lower end and presence of osteomyelitis, will increase dosing regimen to vancomcyin 1750mg IV q12h to ensure patient remains in therapeutic range with parameters shown as follows:     Regimen: 1750 mg IV every 12 hours.  Start time: 11:00 on 10/03/2022  Exposure target: AUC24 (range)400-600 mg/L.hr   AUC24,ss: 494 mg/L.hr  PAUC*: 90 %  Ctrough,ss: 12.3 mg/L  Pconc*: 20 %  Tox.: 8 %    Will obtain repeat random level with AM labs on 10/4 for assessment of regimen.      Level due: Vancomycin random level 10/4 AM labs  Lab: BMP 10/4 AM labs

## 2022-10-03 NOTE — CONSULTS
[facility]   VASCULAR SURGERY CONSULT    Patient Name: Jose Shaikh  : 1960  MRN: [id number]  Primary Care Physician:  Provider, No Known  Date of admission: 10/1/2022    Subjective   Subjective     Chief Complaint: Evaluation for possible right BKA    HPI:    Jose Shaikh is a 61 y.o. male admitted due to diabetic ulcers.  Most significantly there is ulceration in the right TMA.  I have been asked to evaluate from the vascular standpoint.    Review of Systems    Noncontributory except for the history of present illness.    Personal History     Past Medical History:   Diagnosis Date   • Absence of toe of right foot    • Acute osteomyelitis of left calcaneus  2021   • Anxiety and depression    • Arthritis    • Claustrophobia    • Corns and callus    • Diabetic ulcer of left heel associated with type 2 DM 2021   • Diabetic ulcer of left heel associated with type 2 DM 2021   • Diabetic ulcer of right midfoot associated with type 2 DM 2021   • Difficulty walking    • Essential hypertension 2021   • Hammertoe    • Hyperlipidemia LDL goal <100 2021   • Ingrown toenail    • Obesity    • Paroxysmal atrial fibrillation 2021   • Polyneuropathy    • Pressure ulcer, stage 1    • Tinea unguium    • Type 2 diabetes mellitus with polyneuropathy        Past Surgical History:   Procedure Laterality Date   • CYST REMOVAL      center of back; benign   • INCISION AND DRAINAGE ABSCESS      back   • INCISION AND DRAINAGE LEG Right 12/10/2021    Procedure: INCISION AND DRAINAGE LOWER EXTREMITY;  Surgeon: Ash Leyva DPM;  Location: [facility] MAIN OR;  Service: Podiatry;  Laterality: Right;   • OTHER SURGICAL HISTORY      Surgical clips left foot   • TOE SURGERY Right     Removal of 5th toe   • TRANS METATARSAL AMPUTATION Right 2021    Procedure: AMPUTATION TRANS METATARSAL;  Surgeon: Ash Leyva DPM;  Location: [facility] MAIN OR;  Service: Podiatry;  Laterality:  Right;   • WRIST SURGERY Left     repair of injury       Family History: family history includes Cancer in his father; Heart disease in his brother, father, and mother. Otherwise pertinent FHx was reviewed and not pertinent to current issue.    Social History:  reports that he quit smoking about 31 years ago. He smoked 0.00 packs per day for 1.00 year. He has never used smokeless tobacco. He reports current alcohol use. He reports current drug use. Drug: Marijuana.    Home Medications:  Dulaglutide, HYDROcodone-acetaminophen, alfuzosin, apixaban, buPROPion XL, citalopram, glucose blood, insulin aspart, insulin detemir, lisinopril, metFORMIN, metoprolol succinate XL, sertraline, silver sulfadiazine, simvastatin, and sodium hypochlorite      Allergies:  Allergies   Allergen Reactions   • Adhesive Tape Rash       Objective   Objective     Vitals:   Temp:  [97.8 °F (36.6 °C)-98.5 °F (36.9 °C)] 98.5 °F (36.9 °C)  Heart Rate:  [68-89] 89  Resp:  [18] 18  BP: (100-131)/(47-79) 127/69    Physical Exam   General: Alert, no acute distress.  Neck: Supple  Heart: Regular rate  Lungs: Clear  Abdomen: Benign  Extremities: Symmetric.  Moderate to severe right leg edema when compared to the left.  Very superficial ulceration in the plantar aspect of the left foot; ulceration in the midportion of the right TMA.  Pulses: Unable to palpate pedal pulses.     Diagnostic studies: An MRI of the right foot dated 10/2/2022 demonstrates findings of ulceration and soft tissue cellulitis.  There are also changes indicative of osteomyelitis of the medial cuneiform and navicular bones.  There is also less pronounced involvement of the innermost cuneiform bone.  Plain films of the right foot dated 10/1/2022 demonstrate areas of cortical bone changes possibly secondary to osteomyelitis.    Assessment & Plan   Assessment / Plan     Active Hospital Problems:  Active Hospital Problems    Diagnosis    • Onychomycosis    • Onychocryptosis    • Foot  pain, bilateral    • Osteomyelitis (HCC)        Assessment/plan:   Mr. Shaikh has findings by MRI that are indicative of the possibility of osteomyelitis.  I had a discussion with him regarding management options.  His previous vascular evaluation dates back to December 2021 where ABIs of 1.36 and 1.61 on the right and left respectively were identified.  Waveforms appeared triphasic and biphasic in the right leg distally.  This would suggest some significant level of atherosclerotic change with patent vessels.  I had a discussion with him regarding management options.  At this time the plan is to obtain a CTA to better define if any vascular options exist that may modify his course.  If not we will plan on proceeding to a right BKA.      Electronically signed by Jakob Fuller MD, 10/03/22, 4:22 PM EDT.

## 2022-10-03 NOTE — NURSING NOTE
Wound Eval / Progress Noted    KEO Dominguez     Patient Name: Jose Shaikh  : 1960  MRN: [id number]  Today's Date: 10/3/2022                 Admit Date: 10/1/2022    Visit Dx:    ICD-10-CM ICD-9-CM   1. Osteomyelitis, unspecified site, unspecified type (HCC)  M86.9 730.20       Patient Active Problem List   Diagnosis   • Diabetic ulcer of left heel associated with type 2 DM   • Acute osteomyelitis of left calcaneus    • Diabetic ulcer of left heel associated with type 2 DM   • Diabetic ulcer of right midfoot associated with type 2 DM   • Paroxysmal atrial fibrillation   • Essential hypertension   • Hyperlipidemia LDL goal <100   • Cellulitis and abscess of foot   • High alkaline phosphatase level   • Osteomyelitis (HCC)   • Onychomycosis   • Onychocryptosis   • Foot pain, bilateral        Past Medical History:   Diagnosis Date   • Absence of toe of right foot    • Acute osteomyelitis of left calcaneus  2021   • Anxiety and depression    • Arthritis    • Claustrophobia    • Corns and callus    • Diabetic ulcer of left heel associated with type 2 DM 2021   • Diabetic ulcer of left heel associated with type 2 DM 2021   • Diabetic ulcer of right midfoot associated with type 2 DM 2021   • Difficulty walking    • Essential hypertension 2021   • Hammertoe    • Hyperlipidemia LDL goal <100 2021   • Ingrown toenail    • Obesity    • Paroxysmal atrial fibrillation 2021   • Polyneuropathy    • Pressure ulcer, stage 1    • Tinea unguium    • Type 2 diabetes mellitus with polyneuropathy         Past Surgical History:   Procedure Laterality Date   • CYST REMOVAL      center of back; benign   • INCISION AND DRAINAGE ABSCESS      back   • INCISION AND DRAINAGE LEG Right 12/10/2021    Procedure: INCISION AND DRAINAGE LOWER EXTREMITY;  Surgeon: Ash Leyva DPM;  Location: [facility] MAIN OR;  Service: Podiatry;  Laterality: Right;   • OTHER SURGICAL HISTORY      Surgical clips left  foot   • TOE SURGERY Right     Removal of 5th toe   • TRANS METATARSAL AMPUTATION Right 12/2/2021    Procedure: AMPUTATION TRANS METATARSAL;  Surgeon: Ash Leyva DPM;  Location: [facility] MAIN OR;  Service: Podiatry;  Laterality: Right;   • WRIST SURGERY Left     repair of injury         Physical Assessment:  Wound 06/22/21 1133 Left lateral heel (Active)   Wound Image    10/03/22 1145   Dressing Appearance intact;dry 10/03/22 1145   Closure None 10/03/22 1145   Base moist;red;bleeding 10/03/22 1145   Red (%), Wound Tissue Color 100 10/03/22 1145   Periwound pink;dry 10/03/22 1145   Periwound Temperature warm 10/03/22 1145   Periwound Skin Turgor soft 10/03/22 1145   Edges callused 10/03/22 1145   Wound Length (cm) 1 cm 10/03/22 1145   Wound Width (cm) 1 cm 10/03/22 1145   Wound Depth (cm) 0.2 cm 10/03/22 1145   Wound Surface Area (cm^2) 1 cm^2 10/03/22 1145   Wound Volume (cm^3) 0.2 cm^3 10/03/22 1145   Drainage Characteristics/Odor serosanguineous 10/03/22 1145   Drainage Amount scant 10/03/22 1145   Care, Wound cleansed with;sterile normal saline 10/03/22 1145   Dressing Care dressing applied;silver impregnated;hydrofiber;gauze, dry;gauze;tubular wrap 10/03/22 1145   Periwound Care absorptive dressing applied 10/03/22 1145       Wound 12/02/21 Right anterior foot Incision (Active)   Wound Image    10/03/22 1145   Dressing Appearance intact;dry 10/03/22 1145   Closure None 10/03/22 1145   Base moist;red;slough 10/03/22 1145   Red (%), Wound Tissue Color 95 10/03/22 1145   Yellow (%), Wound Tissue Color 5 10/03/22 1145   Periwound swelling;redness;intact;dry 10/03/22 1145   Periwound Temperature warm 10/03/22 1145   Periwound Skin Turgor firm 10/03/22 1145   Edges open 10/03/22 1145   Wound Length (cm) 3.9 cm 10/03/22 1145   Wound Width (cm) 0.5 cm 10/03/22 1145   Wound Surface Area (cm^2) 1.95 cm^2 10/03/22 1145   Drainage Characteristics/Odor serosanguineous 10/03/22 1145   Drainage Amount small  10/03/22 1145   Care, Wound cleansed with;sterile normal saline 10/03/22 1145   Dressing Care dressing applied;silver impregnated;hydrofiber;gauze, dry;gauze;tubular wrap 10/03/22 1145   Periwound Care dry periwound area maintained;absorptive dressing applied 10/03/22 1145        Wound Check / Follow-up:   Patient seen today for a wound consult. Patient with chronic non-healing surgical wound to right foot and ulceration to left plantar aspect of foot. Patient is seen at the wound clinic. Wound bases are red and moist with some evidence of sloughing tissue noted. Applied silver impregnated hydrofiber to wound bases and secured with dry gauze and gauze roll. Recommend BID daily dressing changes with 1/4 strength Dakins moistened gauze. Offload patient bilateral heels using pillows.     Podiatry consulted and awaiting MRI results at this time    Impression: chronic delayed wound healing    Short term goals:  Regain skin integrity, pressure reduction    Karon Bolton RN    10/3/2022    13:31 EDT

## 2022-10-03 NOTE — PROGRESS NOTES
[facility]   Hospitalist Progress Note    Date of admission: 10/1/2022  Patient Name: Jose Shaikh  1960  Date: 10/2/2022      Subjective     Chief Complaint   Patient presents with   • Wound Infection       Summary: 61 y.o. M with IDDM2, pafib on eliquis, and chronic diabetic foot ulcers/wounds, multiple podiatry surgeries, follows chronically with wound clinic, morbid obesity and baseline debility who presents worsening pain in his bilateral feet.  Patient notes he walks on his right stump site to and from the bathroom in his house because his wheelchair can fit into the bathroom     Interval Followup: having some pain in right foot, no acute drainage.  Discussed further mri testing and potential need for amputation - he is hesitant about this.  Discussed his limitations in getting around at home but also how he will have progressive decline and difficulty with wound healing if he is not able to stay off his wound.      Review of Systems  No chest pain or palpitations  No fevers or chills    Objective     Vitals:   Temp:  [97.5 °F (36.4 °C)-98.8 °F (37.1 °C)] 98.4 °F (36.9 °C)  Heart Rate:  [71-77] 73  Resp:  [16-20] 18  BP: (100-125)/(52-65) 125/65    Physical Exam  Gen: awake, resting in bed, conversant  HENT: NCAT, mmm  Resp: ctab, no wrr  CV: RRR, trace LE pitting edema  GI: Abdomen soft, NT, ND, no guarding, +BS  Psych: appropriate mood and affect, aox3  Skin: warm, dry, right foot stump site with open stage 3 ulcerated area on bottom with a small amt of blood noted, probes to bone, left foot small ulceration, no drainage     Result Review:  Vital signs, labs and recent relevant imaging reviewed.      apixaban, 5 mg, Oral, Q12H  atorvastatin, 10 mg, Oral, Nightly  buPROPion XL, 300 mg, Oral, Q AM  citalopram, 10 mg, Oral, Nightly  insulin detemir, 35 Units, Subcutaneous, Nightly  insulin lispro, 0-9 Units, Subcutaneous, TID AC  lisinopril, 20 mg, Oral, Daily  metoprolol succinate XL, 50 mg, Oral,  Daily  piperacillin-tazobactam, 4.5 g, Intravenous, Q8H  sertraline, 100 mg, Oral, Daily  sodium chloride, 10 mL, Intravenous, Q12H  Sodium Hypochlorite, , Topical, Q12H  tamsulosin, 0.4 mg, Oral, Nightly  vancomycin, 1,500 mg, Intravenous, Q12H        •  acetaminophen  •  calcium carbonate  •  dextrose  •  dextrose  •  glucagon (human recombinant)  •  influenza vaccine  •  nitroglycerin  •  ondansetron  •  Pharmacy to dose vancomycin  •  Pharmacy to Dose Zosyn  •  prochlorperazine  •  sodium chloride  •  sodium chloride      Assessment / Plan     Assessment/Plan:  Right diabetic foot infection, cellulitis and likely osteomyelitis   pAfib on eliquis  Thrombocytopenia, chronic   IDDM2 a1c 7.3   Chronic diabetic foot ulcers/wounds,   HTN  BPH  Morbid obesity   Depression  HLD  Hypophosphatemia     Telemetry: nsr no afib noted  *dc telemetry    -check mri of right foot, esr and crp elevated, suspect osteo  -d/w dr mireles, possible vascular surgery c/s needed based on mri findings if requires bka; pt resistant to bka idea   -f/u cultures  -cont vanc/zosyn for now , florastor while on abx, monitor renal fxn  -levemir, ssi, a1c 7.3   -wound care  -cont bp meds   -cont psych meds  -cont flomax, monitor for retention  -pt may benefit from additional dme at home     dispo pending imaging and further surgical eval        DVT prophylaxis:  Medical DVT prophylaxis orders are present.    Level Of Support Discussed With: Patient  Code Status (Patient has no pulse and is not breathing): CPR (Attempt to Resuscitate)  Medical Interventions (Patient has pulse or is breathing): Full Support        CBC    CBC 12/20/21 10/1/22 10/2/22   WBC 4.62 9.70 7.97   RBC 3.93 (A) 4.26 4.26   Hemoglobin 11.2 (A) 11.4 (A) 11.7 (A)   Hematocrit 34.1 (A) 34.8 (A) 34.9 (A)   MCV 86.8 81.7 81.9   MCH 28.5 26.8 27.5   MCHC 32.8 32.8 33.5   RDW 14.1 16.0 (A) 16.1 (A)   Platelets 138 (A) 69 (A) 73 (A)   (A) Abnormal value              CMP    CMP  12/20/21 10/1/22 10/2/22   Glucose 227 (A) 201 (A) 202 (A)   BUN 12 13 14   Creatinine 0.61 (A) 0.62 (A) 0.62 (A)   eGFR Non African Am 134     Sodium 137 132 (A) 135 (A)   Potassium 4.4 4.4 3.8   Chloride 103 102 102   Calcium 8.4 (A) 8.1 (A) 8.3 (A)   Albumin 2.50 (A) 2.80 (A) 3.10 (A)   Total Bilirubin 0.5 2.2 (A) 2.1 (A)   Alkaline Phosphatase 489 (A) 131 (A) 134 (A)   AST (SGOT) 56 (A) 46 (A) 28   ALT (SGPT) 55 (A) 44 (A) 38   (A) Abnormal value       Comments are available for some flowsheets but are not being displayed.             Dispo: pending stability in clinical condition and appropriate discharge location.

## 2022-10-04 LAB
ANION GAP SERPL CALCULATED.3IONS-SCNC: 6.4 MMOL/L (ref 5–15)
BASOPHILS # BLD AUTO: 0.02 10*3/MM3 (ref 0–0.2)
BASOPHILS NFR BLD AUTO: 0.4 % (ref 0–1.5)
BUN SERPL-MCNC: 10 MG/DL (ref 8–23)
BUN/CREAT SERPL: 14.3 (ref 7–25)
CALCIUM SPEC-SCNC: 8.5 MG/DL (ref 8.6–10.5)
CHLORIDE SERPL-SCNC: 104 MMOL/L (ref 98–107)
CO2 SERPL-SCNC: 26.6 MMOL/L (ref 22–29)
CREAT SERPL-MCNC: 0.7 MG/DL (ref 0.76–1.27)
DEPRECATED RDW RBC AUTO: 48.1 FL (ref 37–54)
EGFRCR SERPLBLD CKD-EPI 2021: 103.5 ML/MIN/1.73
EOSINOPHIL # BLD AUTO: 0.15 10*3/MM3 (ref 0–0.4)
EOSINOPHIL NFR BLD AUTO: 2.7 % (ref 0.3–6.2)
ERYTHROCYTE [DISTWIDTH] IN BLOOD BY AUTOMATED COUNT: 15.8 % (ref 12.3–15.4)
GLUCOSE BLDC GLUCOMTR-MCNC: 113 MG/DL (ref 70–99)
GLUCOSE BLDC GLUCOMTR-MCNC: 155 MG/DL (ref 70–99)
GLUCOSE BLDC GLUCOMTR-MCNC: 202 MG/DL (ref 70–99)
GLUCOSE BLDC GLUCOMTR-MCNC: 214 MG/DL (ref 70–99)
GLUCOSE SERPL-MCNC: 126 MG/DL (ref 65–99)
HCT VFR BLD AUTO: 35.8 % (ref 37.5–51)
HGB BLD-MCNC: 11.6 G/DL (ref 13–17.7)
IMM GRANULOCYTES # BLD AUTO: 0.02 10*3/MM3 (ref 0–0.05)
IMM GRANULOCYTES NFR BLD AUTO: 0.4 % (ref 0–0.5)
LYMPHOCYTES # BLD AUTO: 1.02 10*3/MM3 (ref 0.7–3.1)
LYMPHOCYTES NFR BLD AUTO: 18.6 % (ref 19.6–45.3)
MCH RBC QN AUTO: 26.9 PG (ref 26.6–33)
MCHC RBC AUTO-ENTMCNC: 32.4 G/DL (ref 31.5–35.7)
MCV RBC AUTO: 83.1 FL (ref 79–97)
MONOCYTES # BLD AUTO: 0.53 10*3/MM3 (ref 0.1–0.9)
MONOCYTES NFR BLD AUTO: 9.7 % (ref 5–12)
NEUTROPHILS NFR BLD AUTO: 3.73 10*3/MM3 (ref 1.7–7)
NEUTROPHILS NFR BLD AUTO: 68.2 % (ref 42.7–76)
NRBC BLD AUTO-RTO: 0 /100 WBC (ref 0–0.2)
PLATELET # BLD AUTO: 98 10*3/MM3 (ref 140–450)
PMV BLD AUTO: 11 FL (ref 6–12)
POTASSIUM SERPL-SCNC: 3.9 MMOL/L (ref 3.5–5.2)
RBC # BLD AUTO: 4.31 10*6/MM3 (ref 4.14–5.8)
SODIUM SERPL-SCNC: 137 MMOL/L (ref 136–145)
VANCOMYCIN SERPL-MCNC: 21.5 MCG/ML (ref 5–40)
WBC NRBC COR # BLD: 5.47 10*3/MM3 (ref 3.4–10.8)

## 2022-10-04 PROCEDURE — 63710000001 INSULIN DETEMIR PER 5 UNITS: Performed by: STUDENT IN AN ORGANIZED HEALTH CARE EDUCATION/TRAINING PROGRAM

## 2022-10-04 PROCEDURE — 99232 SBSQ HOSP IP/OBS MODERATE 35: CPT | Performed by: INTERNAL MEDICINE

## 2022-10-04 PROCEDURE — 82962 GLUCOSE BLOOD TEST: CPT

## 2022-10-04 PROCEDURE — 80202 ASSAY OF VANCOMYCIN: CPT | Performed by: STUDENT IN AN ORGANIZED HEALTH CARE EDUCATION/TRAINING PROGRAM

## 2022-10-04 PROCEDURE — 99231 SBSQ HOSP IP/OBS SF/LOW 25: CPT | Performed by: SURGERY

## 2022-10-04 PROCEDURE — 25010000002 PIPERACILLIN SOD-TAZOBACTAM PER 1 G: Performed by: STUDENT IN AN ORGANIZED HEALTH CARE EDUCATION/TRAINING PROGRAM

## 2022-10-04 PROCEDURE — 94799 UNLISTED PULMONARY SVC/PX: CPT

## 2022-10-04 PROCEDURE — 25010000002 VANCOMYCIN 5 G RECONSTITUTED SOLUTION: Performed by: STUDENT IN AN ORGANIZED HEALTH CARE EDUCATION/TRAINING PROGRAM

## 2022-10-04 PROCEDURE — 85025 COMPLETE CBC W/AUTO DIFF WBC: CPT | Performed by: INTERNAL MEDICINE

## 2022-10-04 PROCEDURE — 63710000001 INSULIN LISPRO (HUMAN) PER 5 UNITS: Performed by: STUDENT IN AN ORGANIZED HEALTH CARE EDUCATION/TRAINING PROGRAM

## 2022-10-04 PROCEDURE — 94760 N-INVAS EAR/PLS OXIMETRY 1: CPT

## 2022-10-04 PROCEDURE — 80048 BASIC METABOLIC PNL TOTAL CA: CPT | Performed by: INTERNAL MEDICINE

## 2022-10-04 RX ADMIN — METOPROLOL SUCCINATE 50 MG: 50 TABLET, EXTENDED RELEASE ORAL at 09:50

## 2022-10-04 RX ADMIN — APIXABAN 5 MG: 5 TABLET, FILM COATED ORAL at 21:59

## 2022-10-04 RX ADMIN — DAKIN'S SOLUTION 0.125% (QUARTER STRENGTH): 0.12 SOLUTION at 09:50

## 2022-10-04 RX ADMIN — DAKIN'S SOLUTION 0.125% (QUARTER STRENGTH): 0.12 SOLUTION at 22:26

## 2022-10-04 RX ADMIN — INSULIN LISPRO 2 UNITS: 100 INJECTION, SOLUTION INTRAVENOUS; SUBCUTANEOUS at 13:07

## 2022-10-04 RX ADMIN — Medication 250 MG: at 09:50

## 2022-10-04 RX ADMIN — ATORVASTATIN CALCIUM 10 MG: 10 TABLET, FILM COATED ORAL at 22:00

## 2022-10-04 RX ADMIN — VANCOMYCIN HYDROCHLORIDE 1750 MG: 5 INJECTION, POWDER, LYOPHILIZED, FOR SOLUTION INTRAVENOUS at 10:05

## 2022-10-04 RX ADMIN — PIPERACILLIN SODIUM AND TAZOBACTAM SODIUM 4.5 G: 4; 500 INJECTION, POWDER, FOR SOLUTION INTRAVENOUS at 20:47

## 2022-10-04 RX ADMIN — INSULIN DETEMIR 35 UNITS: 100 INJECTION, SOLUTION SUBCUTANEOUS at 22:00

## 2022-10-04 RX ADMIN — INSULIN LISPRO 2 UNITS: 100 INJECTION, SOLUTION INTRAVENOUS; SUBCUTANEOUS at 18:15

## 2022-10-04 RX ADMIN — LISINOPRIL 20 MG: 20 TABLET ORAL at 09:51

## 2022-10-04 RX ADMIN — PIPERACILLIN SODIUM AND TAZOBACTAM SODIUM 4.5 G: 4; 500 INJECTION, POWDER, FOR SOLUTION INTRAVENOUS at 13:07

## 2022-10-04 RX ADMIN — TAMSULOSIN HYDROCHLORIDE 0.4 MG: 0.4 CAPSULE ORAL at 22:00

## 2022-10-04 RX ADMIN — Medication 250 MG: at 22:00

## 2022-10-04 RX ADMIN — APIXABAN 5 MG: 5 TABLET, FILM COATED ORAL at 09:50

## 2022-10-04 RX ADMIN — PIPERACILLIN SODIUM AND TAZOBACTAM SODIUM 4.5 G: 4; 500 INJECTION, POWDER, FOR SOLUTION INTRAVENOUS at 03:21

## 2022-10-04 RX ADMIN — Medication 10 ML: at 09:51

## 2022-10-04 NOTE — PLAN OF CARE
Goal Outcome Evaluation:      No significant changes overnight. Wound care completed per orders. CTA obtained, awaiting results and vascular opinion to determine further plan of care for patient. VSS. Medicated x1 for nausea with relief. No complaints of pain. Divine Patino RN

## 2022-10-04 NOTE — PROGRESS NOTES
[facility]   Hospitalist Progress Note    Date of admission: 10/1/2022  Patient Name: Jose Shaikh  1960  Date: 10/4/2022      Subjective     Chief Complaint   Patient presents with   • Wound Infection       Summary: 61 y.o. M with IDDM2, pafib on eliquis, and chronic diabetic foot ulcers/wounds, multiple podiatry surgeries, follows chronically with wound clinic, morbid obesity and baseline debility who presents worsening pain in his bilateral feet.  Patient notes he walks on his right stump site to and from the bathroom in his house because his wheelchair can fit into the bathroom     Osteomyelitis on MRI, concern will need BKA, vascular surgery assisting, CTA pending and will base bka need based on additional testing.    Interval Followup:   Patient continues to do well.  He occasionally has pain in his bilateral lower feet currently waiting for the plan from vascular and podiatry  Review of Systems  All 10 systems reviewed and negative except for bilateral lower feet pain and wounds  Objective     Vitals:   Temp:  [97.7 °F (36.5 °C)-98.5 °F (36.9 °C)] 97.7 °F (36.5 °C)  Heart Rate:  [63-89] 69  Resp:  [16-18] 18  BP: (102-131)/(52-73) 127/61    Physical Exam  Gen: awake, resting in bed, watching tv. No distress   HENT: NCAT, mmm  Resp: ctab, no wrr  CV: RRR, trace LE pitting edema  GI: Abdomen soft, NT, ND, no guarding, +BS  Psych: appropriate mood and affect, aox3  Skin: warm, dry, b/l feet dressed     Result Review:  Vital signs, labs and recent relevant imaging reviewed.      apixaban, 5 mg, Oral, Q12H  atorvastatin, 10 mg, Oral, Nightly  buPROPion XL, 300 mg, Oral, Q AM  citalopram, 10 mg, Oral, Nightly  insulin detemir, 35 Units, Subcutaneous, Nightly  insulin lispro, 0-9 Units, Subcutaneous, TID AC  lisinopril, 20 mg, Oral, Daily  metoprolol succinate XL, 50 mg, Oral, Daily  piperacillin-tazobactam, 4.5 g, Intravenous, Q8H  saccharomyces boulardii, 250 mg, Oral, BID  sertraline, 100 mg, Oral,  Daily  sodium chloride, 10 mL, Intravenous, Q12H  Sodium Hypochlorite, , Topical, Q12H  tamsulosin, 0.4 mg, Oral, Nightly  vancomycin, 1,750 mg, Intravenous, Q12H        •  acetaminophen  •  calcium carbonate  •  dextrose  •  dextrose  •  glucagon (human recombinant)  •  influenza vaccine  •  nitroglycerin  •  ondansetron  •  Pharmacy to dose vancomycin  •  Pharmacy to Dose Zosyn  •  prochlorperazine  •  sodium chloride  •  sodium chloride      Assessment / Plan     Assessment/Plan:  Right diabetic foot infection, cellulitis and osteomyelitis of the middle cuneiform bone and navicular bone.  There is also less pronounced involvement of the   residual osseous stump of the inner most cuneiform bone.  pAfib on eliquis  Thrombocytopenia, chronic   IDDM2 a1c 7.3   Chronic diabetic foot ulcers/wounds,   HTN  BPH  Morbid obesity   Depression  HLD  Hypophosphatemia     PLAN   -mri foot with osteo findings, esr and crp elevated  -d/w dr mireles - suspects may require bka  -d/w dr salinas - CTA done awaiting plan from vascular   -f/u cultures  - mrsa screen is negative. Will stop vanc and continue patient on zosyn   -levemir, ssi, a1c 7.3, poc gluc monitoring, reasonable control overall   -wound care  -pt/ot  -cont bp meds   -cont psych meds  -cont flomax, monitor for retention    dispo pending cta imaging and further surgical eval.  Would strongly benefit from rehab if approved/able.       DVT prophylaxis:  Medical DVT prophylaxis orders are present.    Level Of Support Discussed With: Patient  Code Status (Patient has no pulse and is not breathing): CPR (Attempt to Resuscitate)  Medical Interventions (Patient has pulse or is breathing): Full Support        CBC    CBC 10/2/22 10/3/22 10/4/22   WBC 7.97 4.78 5.47   RBC 4.26 4.27 4.31   Hemoglobin 11.7 (A) 11.4 (A) 11.6 (A)   Hematocrit 34.9 (A) 36.0 (A) 35.8 (A)   MCV 81.9 84.3 83.1   MCH 27.5 26.7 26.9   MCHC 33.5 31.7 32.4   RDW 16.1 (A) 16.2 (A) 15.8 (A)   Platelets 73 (A)  86 (A) 98 (A)   (A) Abnormal value              CMP    CMP 10/2/22 10/3/22 10/4/22   Glucose 202 (A) 152 (A) 126 (A)   BUN 14 13 10   Creatinine 0.62 (A) 0.66 (A) 0.70 (A)   Sodium 135 (A) 137 137   Potassium 3.8 4.1 3.9   Chloride 102 104 104   Calcium 8.3 (A) 8.1 (A) 8.5 (A)   Albumin 3.10 (A)     Total Bilirubin 2.1 (A)     Alkaline Phosphatase 134 (A)     AST (SGOT) 28     ALT (SGPT) 38     (A) Abnormal value              Dispo: pending stability in clinical condition and appropriate discharge location.

## 2022-10-04 NOTE — PROGRESS NOTES
[facility]     Progress Note    Patient Name: Jose Shaikh  : 1960  MRN: [id number]  Primary Care Physician:  Provider, No Known  Date of admission: 10/1/2022    Subjective   Subjective     Here to follow-up on patient due to right foot wounds and results of CTA.  Doing well.  No complaints.    Objective   Objective     Vitals:   Temp:  [97.7 °F (36.5 °C)-98.2 °F (36.8 °C)] 97.9 °F (36.6 °C)  Heart Rate:  [62-73] 67  Resp:  [16-18] 18  BP: (102-132)/(52-70) 114/64    Physical Exam   General: Alert, no acute distress.  Extremities: Symmetric.    Diagnostic studies: A CTA demonstrates excellent and continuous flow via 2 vessels runoff, anterior and posterior tibials all the way down to the foot.        Assessment & Plan   Assessment / Plan     Assessment/Plan:    I have discussed the findings on the CTA with Mr. Shaikh.  I advised him that there is no vascular intervention indicated at this time from my standpoint.  We discussed regarding amputation.  He is not inclined to do an amputation at this time.  He feels that there is many other things that can be done before proceeding to an amputation and he feels that he may have failed to do his part with walking on his TMA and other issues.  We will follow-up peripherally at this time.    Active Hospital Problems:  Active Hospital Problems    Diagnosis    • Onychomycosis    • Onychocryptosis    • Foot pain, bilateral    • Osteomyelitis (HCC)            Electronically signed by Jakob Fuller MD, 10/04/22, 6:31 PM EDT.

## 2022-10-04 NOTE — PLAN OF CARE
Goal Outcome Evaluation:  Plan of Care Reviewed With: patient        Progress: improving    VSS.  AAOx4.  Patient met with Dr. Fuller today.  No surgical plans at this time.  Patient stable on room air.  No complaints of pain.  Continue to monitor.    Qasim GARCIAN, RN

## 2022-10-05 ENCOUNTER — READMISSION MANAGEMENT (OUTPATIENT)
Dept: CALL CENTER | Facility: HOSPITAL | Age: 62
End: 2022-10-05

## 2022-10-05 VITALS
BODY MASS INDEX: 40.43 KG/M2 | HEIGHT: 74 IN | HEART RATE: 59 BPM | RESPIRATION RATE: 16 BRPM | DIASTOLIC BLOOD PRESSURE: 58 MMHG | OXYGEN SATURATION: 93 % | WEIGHT: 315 LBS | SYSTOLIC BLOOD PRESSURE: 112 MMHG | TEMPERATURE: 97.9 F

## 2022-10-05 PROBLEM — L03.115 CELLULITIS OF RIGHT FOOT: Status: ACTIVE | Noted: 2022-10-05

## 2022-10-05 PROBLEM — M86.171 OSTEOMYELITIS OF FOOT, RIGHT, ACUTE (HCC): Status: ACTIVE | Noted: 2022-10-05

## 2022-10-05 LAB
ANION GAP SERPL CALCULATED.3IONS-SCNC: 6.4 MMOL/L (ref 5–15)
BASOPHILS # BLD AUTO: 0.03 10*3/MM3 (ref 0–0.2)
BASOPHILS NFR BLD AUTO: 0.7 % (ref 0–1.5)
BUN SERPL-MCNC: 12 MG/DL (ref 8–23)
BUN/CREAT SERPL: 16.9 (ref 7–25)
CALCIUM SPEC-SCNC: 8.4 MG/DL (ref 8.6–10.5)
CHLORIDE SERPL-SCNC: 106 MMOL/L (ref 98–107)
CO2 SERPL-SCNC: 25.6 MMOL/L (ref 22–29)
CREAT SERPL-MCNC: 0.71 MG/DL (ref 0.76–1.27)
DEPRECATED RDW RBC AUTO: 49.1 FL (ref 37–54)
EGFRCR SERPLBLD CKD-EPI 2021: 103.1 ML/MIN/1.73
EOSINOPHIL # BLD AUTO: 0.1 10*3/MM3 (ref 0–0.4)
EOSINOPHIL NFR BLD AUTO: 2.2 % (ref 0.3–6.2)
ERYTHROCYTE [DISTWIDTH] IN BLOOD BY AUTOMATED COUNT: 16.4 % (ref 12.3–15.4)
GLUCOSE BLDC GLUCOMTR-MCNC: 192 MG/DL (ref 70–99)
GLUCOSE BLDC GLUCOMTR-MCNC: 204 MG/DL (ref 70–99)
GLUCOSE SERPL-MCNC: 264 MG/DL (ref 65–99)
HCT VFR BLD AUTO: 36.8 % (ref 37.5–51)
HGB BLD-MCNC: 11.7 G/DL (ref 13–17.7)
IMM GRANULOCYTES # BLD AUTO: 0.03 10*3/MM3 (ref 0–0.05)
IMM GRANULOCYTES NFR BLD AUTO: 0.7 % (ref 0–0.5)
LYMPHOCYTES # BLD AUTO: 1 10*3/MM3 (ref 0.7–3.1)
LYMPHOCYTES NFR BLD AUTO: 22.1 % (ref 19.6–45.3)
MCH RBC QN AUTO: 26.5 PG (ref 26.6–33)
MCHC RBC AUTO-ENTMCNC: 31.8 G/DL (ref 31.5–35.7)
MCV RBC AUTO: 83.4 FL (ref 79–97)
MONOCYTES # BLD AUTO: 0.49 10*3/MM3 (ref 0.1–0.9)
MONOCYTES NFR BLD AUTO: 10.8 % (ref 5–12)
NEUTROPHILS NFR BLD AUTO: 2.88 10*3/MM3 (ref 1.7–7)
NEUTROPHILS NFR BLD AUTO: 63.5 % (ref 42.7–76)
NRBC BLD AUTO-RTO: 0 /100 WBC (ref 0–0.2)
PLATELET # BLD AUTO: 110 10*3/MM3 (ref 140–450)
PMV BLD AUTO: 11.3 FL (ref 6–12)
POTASSIUM SERPL-SCNC: 4 MMOL/L (ref 3.5–5.2)
RBC # BLD AUTO: 4.41 10*6/MM3 (ref 4.14–5.8)
SODIUM SERPL-SCNC: 138 MMOL/L (ref 136–145)
WBC NRBC COR # BLD: 4.53 10*3/MM3 (ref 3.4–10.8)

## 2022-10-05 PROCEDURE — 99239 HOSP IP/OBS DSCHRG MGMT >30: CPT | Performed by: INTERNAL MEDICINE

## 2022-10-05 PROCEDURE — 90686 IIV4 VACC NO PRSV 0.5 ML IM: CPT | Performed by: STUDENT IN AN ORGANIZED HEALTH CARE EDUCATION/TRAINING PROGRAM

## 2022-10-05 PROCEDURE — 85025 COMPLETE CBC W/AUTO DIFF WBC: CPT | Performed by: INTERNAL MEDICINE

## 2022-10-05 PROCEDURE — 25010000002 PIPERACILLIN SOD-TAZOBACTAM PER 1 G: Performed by: STUDENT IN AN ORGANIZED HEALTH CARE EDUCATION/TRAINING PROGRAM

## 2022-10-05 PROCEDURE — G0008 ADMIN INFLUENZA VIRUS VAC: HCPCS | Performed by: STUDENT IN AN ORGANIZED HEALTH CARE EDUCATION/TRAINING PROGRAM

## 2022-10-05 PROCEDURE — 63710000001 INSULIN LISPRO (HUMAN) PER 5 UNITS: Performed by: STUDENT IN AN ORGANIZED HEALTH CARE EDUCATION/TRAINING PROGRAM

## 2022-10-05 PROCEDURE — 25010000002 INFLUENZA VAC SPLIT QUAD 0.5 ML SUSPENSION PREFILLED SYRINGE: Performed by: STUDENT IN AN ORGANIZED HEALTH CARE EDUCATION/TRAINING PROGRAM

## 2022-10-05 PROCEDURE — 82962 GLUCOSE BLOOD TEST: CPT

## 2022-10-05 PROCEDURE — 97161 PT EVAL LOW COMPLEX 20 MIN: CPT

## 2022-10-05 PROCEDURE — 25010000002 VANCOMYCIN 5 G RECONSTITUTED SOLUTION: Performed by: EMERGENCY MEDICINE

## 2022-10-05 PROCEDURE — 80048 BASIC METABOLIC PNL TOTAL CA: CPT | Performed by: INTERNAL MEDICINE

## 2022-10-05 RX ORDER — DOXYCYCLINE HYCLATE 100 MG/1
100 CAPSULE ORAL 2 TIMES DAILY
Qty: 60 CAPSULE | Refills: 0 | Status: SHIPPED | OUTPATIENT
Start: 2022-10-05 | End: 2022-11-04

## 2022-10-05 RX ORDER — AMOXICILLIN AND CLAVULANATE POTASSIUM 875; 125 MG/1; MG/1
1 TABLET, FILM COATED ORAL 2 TIMES DAILY
Qty: 60 TABLET | Refills: 0 | Status: SHIPPED | OUTPATIENT
Start: 2022-10-05 | End: 2022-11-04

## 2022-10-05 RX ADMIN — SERTRALINE HYDROCHLORIDE 100 MG: 100 TABLET ORAL at 08:21

## 2022-10-05 RX ADMIN — PIPERACILLIN SODIUM AND TAZOBACTAM SODIUM 4.5 G: 4; 500 INJECTION, POWDER, FOR SOLUTION INTRAVENOUS at 04:56

## 2022-10-05 RX ADMIN — Medication 250 MG: at 08:21

## 2022-10-05 RX ADMIN — Medication 10 ML: at 08:22

## 2022-10-05 RX ADMIN — METOPROLOL SUCCINATE 50 MG: 50 TABLET, EXTENDED RELEASE ORAL at 08:21

## 2022-10-05 RX ADMIN — PIPERACILLIN SODIUM AND TAZOBACTAM SODIUM 4.5 G: 4; 500 INJECTION, POWDER, FOR SOLUTION INTRAVENOUS at 12:47

## 2022-10-05 RX ADMIN — INSULIN LISPRO 4 UNITS: 100 INJECTION, SOLUTION INTRAVENOUS; SUBCUTANEOUS at 12:54

## 2022-10-05 RX ADMIN — INSULIN LISPRO 2 UNITS: 100 INJECTION, SOLUTION INTRAVENOUS; SUBCUTANEOUS at 09:06

## 2022-10-05 RX ADMIN — APIXABAN 5 MG: 5 TABLET, FILM COATED ORAL at 08:22

## 2022-10-05 RX ADMIN — ACETAMINOPHEN 650 MG: 325 TABLET ORAL at 08:21

## 2022-10-05 RX ADMIN — INFLUENZA VIRUS VACCINE 0.5 ML: 15; 15; 15; 15 SUSPENSION INTRAMUSCULAR at 15:36

## 2022-10-05 RX ADMIN — LISINOPRIL 20 MG: 20 TABLET ORAL at 08:21

## 2022-10-05 RX ADMIN — DAKIN'S SOLUTION 0.125% (QUARTER STRENGTH): 0.12 SOLUTION at 12:47

## 2022-10-05 NOTE — DISCHARGE SUMMARY
[facility]         HOSPITALIST  DISCHARGE SUMMARY    Patient Name: Jose Shaikh  : 1960  MRN: [id number]    Date of Admission: 10/1/2022  Date of Discharge:  10/5/2022    Primary Care Physician: Provider, No Known    Consults     Date and Time Order Name Status Description    10/1/2022  2:19 PM Inpatient Hospitalist Consult      10/1/2022  1:26 PM General MD Inpatient Consult Completed           Active and Resolved Hospital Problems:  Active Hospital Problems    Diagnosis POA   • Osteomyelitis of foot, right, acute (HCC) [M86.171] Yes   • Cellulitis of right foot [L03.115] Yes   • Onychomycosis [B35.1] Unknown   • Onychocryptosis [L60.0] Unknown   • Foot pain, bilateral [M79.671, M79.672] Unknown   • Osteomyelitis (HCC) [M86.9] Yes      Resolved Hospital Problems   No resolved problems to display.       Hospital Course     Hospital Course:  Jose Shaikh is a 61 y.o. male  with IDDM2, pafib on eliquis, and chronic diabetic foot ulcers/wounds, multiple podiatry surgeries, follows chronically with wound clinic, morbid obesity and baseline debility who presents worsening pain in his bilateral feet.  Patient notes he walks on his right stump site to and from the bathroom in his house because his wheelchair can fit into the bathroom.  Patient was admitted to the hospital patient was started on IV antibiotics patient did have an MRI of his foot which did confirm osteomyelitis patient was seen by vascular surgery as well as podiatry.  Vascular surgery performed a CTA and advised the patient that there is no vascular intervention indicated at this time.  Amputation was discussed with patient however patient declined to have an amputation at this time.  Patient wanted to continue with wound care and antibiotics.  I did speak with the infectious disease doctor Dr. Olivarez who recommended 4 weeks of Augmentin/doxycycline and to repeat ESR/CRP at the end of antibiotics.  At some point patient will  likely need amputation however he does not want to have it currently.  Patient will be discharged home to continue home health services and wound care clinic.  Patient should present back to the hospital with any worsening symptoms.          DISCHARGE Follow Up Recommendations for labs and diagnostics:   Wound care clinic       Day of Discharge     Vital Signs:  Temp:  [97.9 °F (36.6 °C)-98.1 °F (36.7 °C)] 97.9 °F (36.6 °C)  Heart Rate:  [59-71] 59  Resp:  [16-18] 16  BP: (112-120)/(49-66) 112/58  Physical Exam:   Gen: awake, resting in bed, watching tv. No distress   HENT: NCAT, mmm  Resp: ctab, no wrr  CV: RRR, trace LE pitting edema  GI: Abdomen soft, NT, ND, no guarding, +BS  Psych: appropriate mood and affect, aox3  Skin: warm, dry, b/l feet dressed       Discharge Details        Discharge Medications      New Medications      Instructions Start Date   amoxicillin-clavulanate 875-125 MG per tablet  Commonly known as: Augmentin   1 tablet, Oral, 2 Times Daily      doxycycline 100 MG capsule  Commonly known as: VIBRAMYCIN   100 mg, Oral, 2 Times Daily         Continue These Medications      Instructions Start Date   alfuzosin 10 MG 24 hr tablet  Commonly known as: UROXATRAL   10 mg, Oral, Daily      apixaban 5 MG tablet tablet  Commonly known as: ELIQUIS   5 mg, Oral, 2 times daily      buPROPion  MG 24 hr tablet  Commonly known as: WELLBUTRIN XL   300 mg, Oral, Every Early Morning      glucose blood test strip   Test blood sugar 3 times a day      HYDROcodone-acetaminophen 5-325 MG per tablet  Commonly known as: Norco   1 tablet, Oral, Every 4 Hours PRN      insulin aspart 100 UNIT/ML injection  Commonly known as: NovoLOG   20 Units, Subcutaneous, 3 Times Daily Before Meals, Take up to 20 units as instructed      insulin detemir 100 UNIT/ML injection  Commonly known as: LEVEMIR   50 Units, Subcutaneous, Nightly, Take 40 units and adjust to 50 as instructed      lisinopril 20 MG tablet  Commonly known as:  PRINIVIL,ZESTRIL   20 mg, Oral, Daily      metFORMIN 1000 MG tablet  Commonly known as: GLUCOPHAGE   1,000 mg, Oral, 2 times daily      metoprolol succinate XL 50 MG 24 hr tablet  Commonly known as: TOPROL-XL   50 mg, Oral, Daily      sertraline 100 MG tablet  Commonly known as: ZOLOFT   100 mg, Oral, Daily      silver sulfadiazine 1 % cream  Commonly known as: Silvadene   1 application, Topical, Daily      simvastatin 20 MG tablet  Commonly known as: ZOCOR   20 mg, Oral, Nightly      sodium hypochlorite 0.125 % solution topical solution 0.125%  Commonly known as: DAKIN'S 1/4 STRENGTH   10 mL, Topical, 2 Times Daily      Trulicity 1.5 MG/0.5ML solution pen-injector  Generic drug: Dulaglutide   INJECT ONE SYRINGE UNDER THE SKIN ONCE WEEKLY **APPOINTMENT NEEDED FOR MORE REFILLS**         Stop These Medications    citalopram 10 MG tablet  Commonly known as: CeleXA            Allergies   Allergen Reactions   • Adhesive Tape Rash       Discharge Disposition:  Home-Health Care [facility]    Diet:  Hospital:  Diet Order   Procedures   • Diet Regular; Consistent Carbohydrate       Discharge Activity: as tolerated       CODE STATUS:  Code Status and Medical Interventions:   Ordered at: 10/01/22 1630     Level Of Support Discussed With:    Patient     Code Status (Patient has no pulse and is not breathing):    CPR (Attempt to Resuscitate)     Medical Interventions (Patient has pulse or is breathing):    Full Support         Future Appointments   Date Time Provider Department Center   10/10/2022  2:00 PM Kami Garcia APRN [facility] DIAB ET [facility]   10/20/2022  2:00 PM ROOM 1 NURSE [facility] WOUND CARE [facility]       Additional Instructions for the Follow-ups that You Need to Schedule     Discharge Follow-up with PCP   As directed       Currently Documented PCP:    Provider, No Known    PCP Phone Number:    None     Follow Up Details: in 1 week         Discharge Follow-up with Specified Provider: Wound care Dr Daniels; 1 Week   As  directed      To: Wound care Dr Daniels    Follow Up: 1 Week               Pertinent  and/or Most Recent Results     PROCEDURES:   None     LAB RESULTS:      Lab 10/05/22  0407 10/04/22  0348 10/03/22  0604 10/02/22  0450 10/01/22  1342 10/01/22  1226   WBC 4.53 5.47 4.78 7.97 9.70  --    HEMOGLOBIN 11.7* 11.6* 11.4* 11.7* 11.4*  --    HEMATOCRIT 36.8* 35.8* 36.0* 34.9* 34.8*  --    PLATELETS 110* 98* 86* 73* 69*  --    NEUTROS ABS 2.88 3.73 3.30 6.30 8.04*  --    IMMATURE GRANS (ABS) 0.03 0.02 0.03 0.03 0.06*  --    LYMPHS ABS 1.00 1.02 0.83 0.88 0.92  --    MONOS ABS 0.49 0.53 0.48 0.69 0.64  --    EOS ABS 0.10 0.15 0.12 0.05 0.02  --    MCV 83.4 83.1 84.3 81.9 81.7  --    SED RATE  --   --   --   --   --  35*   CRP  --   --   --   --   --  13.61*   PROCALCITONIN  --   --   --  0.71*  --   --    LACTATE  --   --   --   --   --  2.1*         Lab 10/05/22  0407 10/04/22  0348 10/03/22  0604 10/02/22  0450 10/01/22  1342 10/01/22  1335   SODIUM 138 137 137 135*  --  132*   POTASSIUM 4.0 3.9 4.1 3.8  --  4.4   CHLORIDE 106 104 104 102  --  102   CO2 25.6 26.6 27.7 21.9*  --  23.2   ANION GAP 6.4 6.4 5.3 11.1  --  6.8   BUN 12 10 13 14  --  13   CREATININE 0.71* 0.70* 0.66* 0.62*  --  0.62*   EGFR 103.1 103.5 105.4 107.4  --  107.4   GLUCOSE 264* 126* 152* 202*  --  201*   CALCIUM 8.4* 8.5* 8.1* 8.3*  --  8.1*   MAGNESIUM  --   --   --  1.7  --   --    PHOSPHORUS  --   --  2.6 1.9*  --   --    HEMOGLOBIN A1C  --   --   --   --  7.30*  --          Lab 10/02/22  0450 10/01/22  1335   TOTAL PROTEIN 6.3 6.1   ALBUMIN 3.10* 2.80*   GLOBULIN 3.2 3.3   ALT (SGPT) 38 44*   AST (SGOT) 28 46*   BILIRUBIN 2.1* 2.2*   ALK PHOS 134* 131*                     Brief Urine Lab Results     None        Microbiology Results (last 10 days)     Procedure Component Value - Date/Time    MRSA Screen, PCR (Inpatient) - Swab, Nares [[id number]]  (Normal) Collected: 10/03/22 0223    Lab Status: Final result Specimen: Swab from Nares Updated:  10/03/22 1917     MRSA PCR No MRSA Detected    Narrative:      The negative predictive value of this diagnostic test is high and should only be used to consider de-escalating anti-MRSA therapy. A positive result may indicate colonization with MRSA and must be correlated clinically.    Blood Culture - Blood, Arm, Left [[id number]]  (Normal) Collected: 10/01/22 1226    Lab Status: Preliminary result Specimen: Blood from Arm, Left Updated: 10/05/22 1247     Blood Culture No growth at 4 days          CT Angio Abdominal Aorta Bilateral Iliofem Runoff    Result Date: 10/4/2022  Impression:   1. Diffuse calcified plaque throughout the lower extremity arterial systems bilaterally.  There is occlusion of the right peroneal artery near its origin.  Right anterior and posterior tibial arteries appear patent into the foot.  The left anterior tibial artery appears to be occluded near the level of the mid lower leg.  The left peroneal and posterior tibial arteries are patent into the foot.  2. Nodular appearance of the liver with splenic enlargement.  Findings would be consistent with changes of cirrhosis and portal venous hypertension 3. Thick-walled gallbladder which is nonspecific but can be seen with acute or chronic cholecystitis 4. Mildly enlarged right iliac lymph nodes which are likely reactive in etiology  SANJIV NJ MD       Electronically Signed and Approved By: SANJIV NJ MD on 10/04/2022 at 8:48             MRI Foot Right With & Without Contrast    Result Date: 10/2/2022  Impression:   1. There is a wound or ulceration involving the soft tissues at the site of the surgical stump of the right midfoot.  There is edema and enhancement throughout the soft tissues of the surgical stump suggesting changes of soft tissue cellulitis.  No well-defined fluid collection is seen to suggest abscess. 2. Findings indicating changes of osteomyelitis involving the underlying surgical osseous stump of the middle cuneiform bone  and navicular bone.  There is also less pronounced involvement of the residual osseous stump of the inner most cuneiform bone. 3. Susceptibility artifact is noted at the medial aspect of the hindfoot corresponding to the curvilinear metallic foreign body in the soft tissues in this region on the radiographs.     ELDER GARRISON MD       Electronically Signed and Approved By: ELDER GARRISON MD on 10/02/2022 at 21:49               Results for orders placed during the hospital encounter of 12/01/21    Doppler Ankle Brachial Index Single Level CAR    Interpretation Summary  · Left Conclusion: The left RIZWANA is normal. Waveforms are consistent with tib-peroneal disease. Normal digital pressures.  · Right Conclusion: The right RIZWANA is normal. Normal digital pressures.      Results for orders placed during the hospital encounter of 12/01/21    Doppler Ankle Brachial Index Single Level CAR    Interpretation Summary  · Left Conclusion: The left RIZWANA is normal. Waveforms are consistent with tib-peroneal disease. Normal digital pressures.  · Right Conclusion: The right RIZWANA is normal. Normal digital pressures.          Labs Pending at Discharge:  Pending Labs     Order Current Status    Blood Culture - Blood, Arm, Left Preliminary result            Time spent on Discharge including face to face service:  More than 35 minutes    Electronically signed by Bandar Sheth DO, 10/05/22, 1:23 PM EDT.

## 2022-10-05 NOTE — PLAN OF CARE
Goal Outcome Evaluation:  Plan of Care Reviewed With: patient        Progress: no change  Outcome Evaluation: Patient reports he is at baseline functional mobility and does not need skilled PT services at this time. Patient educated on maintaining NWB to RLE to promote healing and he reports he is unable to maintain weightbearing status at all times. D/C PT order at this time as patient declines the need for PT services.

## 2022-10-05 NOTE — THERAPY EVALUATION
Acute Care - Physical Therapy Initial Evaluation  [facility]     Patient Name: Jose Shaikh  : 1960  MRN: [id number]  Today's Date: 10/5/2022      Visit Dx:     ICD-10-CM ICD-9-CM   1. Osteomyelitis, unspecified site, unspecified type (HCC)  M86.9 730.20   2. Difficulty walking  R26.2 719.7     Patient Active Problem List   Diagnosis   • Diabetic ulcer of left heel associated with type 2 DM   • Acute osteomyelitis of left calcaneus    • Diabetic ulcer of left heel associated with type 2 DM   • Diabetic ulcer of right midfoot associated with type 2 DM   • Paroxysmal atrial fibrillation   • Essential hypertension   • Hyperlipidemia LDL goal <100   • Cellulitis and abscess of foot   • High alkaline phosphatase level   • Osteomyelitis ([facility])   • Onychomycosis   • Onychocryptosis   • Foot pain, bilateral   • Osteomyelitis of foot, right, acute ([facility])   • Cellulitis of right foot     Past Medical History:   Diagnosis Date   • Absence of toe of right foot    • Acute osteomyelitis of left calcaneus  2021   • Anxiety and depression    • Arthritis    • Claustrophobia    • Corns and callus    • Diabetic ulcer of left heel associated with type 2 DM 2021   • Diabetic ulcer of left heel associated with type 2 DM 2021   • Diabetic ulcer of right midfoot associated with type 2 DM 2021   • Difficulty walking    • Essential hypertension 2021   • Hammertoe    • Hyperlipidemia LDL goal <100 2021   • Ingrown toenail    • Obesity    • Paroxysmal atrial fibrillation 2021   • Polyneuropathy    • Pressure ulcer, stage 1    • Tinea unguium    • Type 2 diabetes mellitus with polyneuropathy      Past Surgical History:   Procedure Laterality Date   • CYST REMOVAL      center of back; benign   • INCISION AND DRAINAGE ABSCESS      back   • INCISION AND DRAINAGE LEG Right 12/10/2021    Procedure: INCISION AND DRAINAGE LOWER EXTREMITY;  Surgeon: Ash Leyva DPM;  Location: [facility] MAIN OR;   Service: Podiatry;  Laterality: Right;   • OTHER SURGICAL HISTORY      Surgical clips left foot   • TOE SURGERY Right     Removal of 5th toe   • TRANS METATARSAL AMPUTATION Right 12/2/2021    Procedure: AMPUTATION TRANS METATARSAL;  Surgeon: Ash Leyva DPM;  Location: [facility] MAIN OR;  Service: Podiatry;  Laterality: Right;   • WRIST SURGERY Left     repair of injury     PT Assessment (last 12 hours)     PT Evaluation and Treatment     Row Name 10/05/22 1300          Physical Therapy Time and Intention    Document Type evaluation  -AV     Mode of Treatment individual therapy;physical therapy  -AV     Row Name 10/05/22 1300          General Information    Patient Profile Reviewed yes  -AV     Prior Level of Function --  Reports independence with ADLs. Primarily uses w/c for mobility but reports ambulating small distances even though he is supposed to be NWB on RLE. No home O2.  -AV     Equipment Currently Used at Home walker, rolling  -AV     Row Name 10/05/22 1300          Living Environment    Current Living Arrangements home  -AV     Home Accessibility stairs within home  -AV     People in Home alone  -AV     Row Name 10/05/22 1300          Stairs Within Home, Primary    Stairs, Within Home, Primary Flight to basement where laundry is located.  -AV     Row Name 10/05/22 1300          Range of Motion (ROM)    Range of Motion bilateral lower extremities;ROM is WFL  -AV     Row Name 10/05/22 1300          Strength (Manual Muscle Testing)    Strength (Manual Muscle Testing) bilateral lower extremities;strength is WFL  except R ankle not tested due to bandaging  -AV     Row Name 10/05/22 1300          Mobility    Extremity Weight-bearing Status right lower extremity  -AV     Right Lower Extremity (Weight-bearing Status) non weight-bearing (NWB)  per report  -AV     Row Name 10/05/22 1300          Bed Mobility    Bed Mobility supine-sit-supine  -AV     Supine-Sit-Supine [place] (Bed Mobility) modified  independence  -AV     Row Name 10/05/22 1300          Transfers    Transfers sit-stand transfer  -AV     Maintains Weight-bearing Status (Transfers) other (see comments)  does not attempt to maintain  -AV     Comment, (Transfers) Patient does not maintain NWB to RLE despite education. He reports he is unable to do so although he does not attempt to stand while maintaining NWB.  -AV     Sit-Stand [place] (Transfers) supervision  -AV     Row Name 10/05/22 1300          Sit-Stand Transfer    Assistive Device (Sit-Stand Transfers) bariatric;walker, standard  -AV     Row Name 10/05/22 1300          Gait/Stairs (Locomotion)    Comment, (Gait/Stairs) Patient begins ambulating impulsively despite education and cueing to maintain NWB precautions. Patient reports he has been ambulating to/from bathroom ad jace.  -AV     Row Name 10/05/22 1300          Safety Issues, Functional Mobility    Safety Issues Affecting Function (Mobility) judgment;insight into deficits/self-awareness;safety precautions follow-through/compliance  -AV     Row Name 10/05/22 1300          Balance    Balance Assessment standing static balance  -AV     Static Standing Balance supervision  -AV     Position/Device Used, Standing Balance supported;walker, standard  -AV     Row Name             Wound 12/02/21 Right anterior foot Incision    Wound - Properties Group Placement Date: 12/02/21  -ES Side: Right  -ES Orientation: anterior  -ES Location: foot  -ES Primary Wound Type: Incision  -ES     Retired Wound - Properties Group Placement Date: 12/02/21  -ES Side: Right  -ES Orientation: anterior  -ES Location: foot  -ES Primary Wound Type: Incision  -ES     Retired Wound - Properties Group Date first assessed: 12/02/21  -ES Side: Right  -ES Location: foot  -ES Primary Wound Type: Incision  -ES     Row Name             Wound 06/22/21 1133 Left lateral heel    Wound - Properties Group Placement Date: 06/22/21  -FABIOLA Placement Time: 1133  -FABIOLA Present on  Hospital Admission: Y  -FABIOLA Side: Left  -FABIOLA Orientation: lateral  -FABIOLA Location: heel  -FABIOLA     Retired Wound - Properties Group Placement Date: 06/22/21  -FABIOLA Placement Time: 1133  -FABIOLA Present on Hospital Admission: Y  -FABIOLA Side: Left  -FABIOLA Orientation: lateral  -FABIOLA Location: heel  -FABIOLA     Retired Wound - Properties Group Date first assessed: 06/22/21  -FABIOLA Time first assessed: 1133  -FABIOLA Present on Hospital Admission: Y  -FABIOLA Side: Left  -FABIOLA Location: heel  -FABIOLA     Row Name 10/05/22 1300          Plan of Care Review    Plan of Care Reviewed With patient  -AV     Progress no change  -AV     Outcome Evaluation Patient reports he is at baseline functional mobility and does not need skilled PT services at this time. Patient educated on maintaining NWB to RLE to promote healing and he reports he is unable to maintain weightbearing status at all times. D/C PT order at this time as patient declines the need for PT services.  -AV     Row Name 10/05/22 1300          Therapy Assessment/Plan (PT)    Criteria for Skilled Interventions Met (PT) patient/family refuse skilled intervention at this time  -AV     Therapy Frequency (PT) evaluation only  -AV     Problem List (PT) problems related to;balance;mobility  -AV     Activity Limitations Related to Problem List (PT) unable to ambulate safely;unable to transfer safely  -AV     Row Name 10/05/22 1300          PT Evaluation Complexity    History, PT Evaluation Complexity 1-2 personal factors and/or comorbidities  -AV     Examination of Body Systems (PT Eval Complexity) total of 4 or more elements  -AV     Clinical Presentation (PT Evaluation Complexity) stable  -AV     Clinical Decision Making (PT Evaluation Complexity) low complexity  -AV     Overall Complexity (PT Evaluation Complexity) low complexity  -AV     Row Name 10/05/22 1300          Therapy Plan Review/Discharge Plan (PT)    Therapy Plan Review (PT) evaluation/treatment results reviewed;patient  -AV           User Key  (r) =  Recorded By, (t) = Taken By, (c) = Cosigned By    Initials Name Provider Type    Marlen Vaughn, RN Registered Nurse    AV Frankie Ospina, PT Physical Therapist    Katlyn Garcia, RN Registered Nurse                Physical Therapy Education                 Title: PT OT SLP Therapies (In Progress)     Topic: Physical Therapy (In Progress)     Point: Mobility training (Done)     Learning Progress Summary           Patient Acceptance, E,TB, VU by AV at 10/5/2022 1349                   Point: Home exercise program (Not Started)     Learner Progress:  Not documented in this visit.          Point: Body mechanics (Done)     Learning Progress Summary           Patient Acceptance, E,TB, VU by AV at 10/5/2022 1349                   Point: Precautions (Done)     Learning Progress Summary           Patient Acceptance, E,TB, VU by AV at 10/5/2022 1349                               User Key     Initials Effective Dates Name Provider Type Discipline     06/11/21 -  Frankie Ospina, PT Physical Therapist PT              PT Recommendation and Plan  Anticipated Discharge Disposition (PT): home with home health  Therapy Frequency (PT): evaluation only  Plan of Care Reviewed With: patient  Progress: no change  Outcome Evaluation: Patient reports he is at baseline functional mobility and does not need skilled PT services at this time. Patient educated on maintaining NWB to RLE to promote healing and he reports he is unable to maintain weightbearing status at all times. D/C PT order at this time as patient declines the need for PT services.   Outcome Measures     Row Name 10/05/22 1300             How much help from another person do you currently need...    Turning from your back to your side while in flat bed without using bedrails? 4  -AV      Moving from lying on back to sitting on the side of a flat bed without bedrails? 4  -AV      Moving to and from a bed to a chair (including a wheelchair)? 3  -AV      Standing up  from a chair using your arms (e.g., wheelchair, bedside chair)? 3  -AV      Climbing 3-5 steps with a railing? 2  -AV      To walk in hospital room? 2  -AV      AM-PAC 6 Clicks Score (PT) 18  -AV              Functional Assessment    Outcome Measure Options AM-PAC 6 Clicks Basic Mobility (PT)  -AV            User Key  (r) = Recorded By, (t) = Taken By, (c) = Cosigned By    Initials Name Provider Type    Frankie Brunson, PT Physical Therapist                 Time Calculation:    PT Charges     Row Name 10/05/22 1346             Time Calculation    PT Received On 10/05/22  -AV              Untimed Charges    PT Eval/Re-eval Minutes 32  -AV              Total Minutes    Untimed Charges Total Minutes 32  -AV       Total Minutes 32  -AV            User Key  (r) = Recorded By, (t) = Taken By, (c) = Cosigned By    Initials Name Provider Type    Frankie Brunson, PT Physical Therapist              Therapy Charges for Today     Code Description Service Date Service Provider Modifiers Qty    [id number] HC PT EVAL LOW COMPLEXITY 3 10/5/2022 Frankie Ospina, PT GP 1          PT G-Codes  Outcome Measure Options: AM-PAC 6 Clicks Basic Mobility (PT)  AM-PAC 6 Clicks Score (PT): 18    Frankie Ospina PT  10/5/2022

## 2022-10-06 LAB — BACTERIA SPEC AEROBE CULT: NORMAL

## 2022-10-06 NOTE — OUTREACH NOTE
Prep Survey    Flowsheet Row Responses   [facility]   Is LACE score < 7 ? No   Emergency Room discharge w/ pulse ox? No   Eligibility Readm Mgmt   Discharge diagnosis Osteomyelitis of foot, right, acute   Does the patient have one of the following disease processes/diagnoses(primary or secondary)? Other   Does the patient have Home health ordered? No   Is there a DME ordered? No   Prep survey completed? Yes          MARSHALL SLATER - Registered Nurse

## 2022-10-07 ENCOUNTER — TELEPHONE (OUTPATIENT)
Dept: WOUND CARE | Facility: HOSPITAL | Age: 62
End: 2022-10-07

## 2022-10-07 NOTE — TELEPHONE ENCOUNTER
ASMITA WITH [facility] CALLED TO ASK ABOUT WOUND CARE ORDERS. HE IS CURRENTLY DOING [facility] WET-TO-DRY BID, BUT SHE FEELS HE MAY DO BETTER GOING BACK TO HIS PREVIOUS WOUND CARE OF [facility]. PER DR. MAYER: IT IS OK TO GO BACK TO THE [facility], BUT THE MAIN THING IS FOR HIM TO STAY OFF OF HIS FOOT. ASMITA ADVISED OF ABOVE AND THE DATE OF THE PATIENT'S NEXT FOLLOW-UP WITH US.

## 2022-10-10 ENCOUNTER — APPOINTMENT (OUTPATIENT)
Dept: DIABETES SERVICES | Facility: HOSPITAL | Age: 62
End: 2022-10-10

## 2022-10-10 NOTE — PROGRESS NOTES
"Chief Complaint  No chief complaint on file.    Referred By: Corazon Gr MD    Subjective     {Problem List  Visit Diagnosis   Encounters  Notes  Medications  Labs  Result Review Imaging  Media :23}     Jose Shaikh presents to [facility] for diabetes medication management    History of Present Illness    Visit type:  follow-up  Diabetes type:  Type 2  Current diabetes status/concerns/issues:  ***  Other health concerns: ***  Diabetes symptoms:    Polyuria: {Yes No:10509::\"No\"}   Polydipsia: {Yes No:68134::\"No\"}   Polyphagia: {Yes No:58005::\"No\"}   Blurred vision: {Yes No:99216::\"No\"}   Excessive fatigue: {Yes No:96094::\"No\"}  Diabetes complications:    Hypoglycemia:  {Hypoglycemia:13793}  Hypoglycemia Symptoms:  shaking/tremors  Current diabetes treatment:  ***Neuropathy:Yes, In the lower extremities  Nephropathy:No  Retinopathy:No  Amputation/Wounds:Yes, He has a nonhealing ulcer on the left heel and another on the right distal midfoot with osteomyelitis of the left heel he is undergoing hyperbaric oxygen therapy for treatment of the osteomyelitis  Gastroparesis:No  Cardiovascular Disease: Hypertension  Erectile Dysfunction:No  Blood glucose device:  Meter  Blood glucose monitoring frequency:  2 -3  Blood glucose range/average:  ***  Diet:  {Diabetes Diet Plan:67759}  Activity/Exercise:  None    Past Medical History:   Diagnosis Date   • Absence of toe of right foot    • Acute osteomyelitis of left calcaneus  8/18/2021   • Anxiety and depression    • Arthritis    • Claustrophobia    • Corns and callus    • Diabetic ulcer of left heel associated with type 2 DM 8/18/2021   • Diabetic ulcer of left heel associated with type 2 DM 7/6/2021   • Diabetic ulcer of right midfoot associated with type 2 DM 8/18/2021   • Difficulty walking    • Essential hypertension 8/31/2021   • Hammertoe    • Hyperlipidemia LDL goal <100 8/31/2021   • Ingrown toenail    • Obesity    • " Paroxysmal atrial fibrillation 2021   • Polyneuropathy    • Pressure ulcer, stage 1    • Tinea unguium    • Type 2 diabetes mellitus with polyneuropathy      Past Surgical History:   Procedure Laterality Date   • CYST REMOVAL      center of back; benign   • INCISION AND DRAINAGE ABSCESS      back   • INCISION AND DRAINAGE LEG Right 12/10/2021    Procedure: INCISION AND DRAINAGE LOWER EXTREMITY;  Surgeon: Ash Leyva DPM;  Location: [facility] MAIN OR;  Service: Podiatry;  Laterality: Right;   • OTHER SURGICAL HISTORY      Surgical clips left foot   • TOE SURGERY Right     Removal of 5th toe   • TRANS METATARSAL AMPUTATION Right 2021    Procedure: AMPUTATION TRANS METATARSAL;  Surgeon: Ash Leyva DPM;  Location: [facility] MAIN OR;  Service: Podiatry;  Laterality: Right;   • WRIST SURGERY Left     repair of injury     Family History   Problem Relation Age of Onset   • Heart disease Mother    • Heart disease Father    • Cancer Father         Unspecified   • Heart disease Brother      Social History     Socioeconomic History   • Marital status: Single   Tobacco Use   • Smoking status: Former     Packs/day: 0.00     Years: 1.00     Pack years: 0.00     Types: Cigarettes     Quit date: 1991     Years since quittin.1   • Smokeless tobacco: Never   • Tobacco comments:     quit at age 30   Vaping Use   • Vaping Use: Never used   Substance and Sexual Activity   • Alcohol use: Yes     Comment: Rare   • Drug use: Yes     Types: Marijuana     Comment: Daily   • Sexual activity: Defer     Allergies   Allergen Reactions   • Adhesive Tape Rash       Current Outpatient Medications:   •  alfuzosin (UROXATRAL) 10 MG 24 hr tablet, Take 10 mg by mouth Daily., Disp: , Rfl:   •  amoxicillin-clavulanate (Augmentin) 875-125 MG per tablet, Take 1 tablet by mouth 2 (Two) Times a Day for 30 days., Disp: 60 tablet, Rfl: 0  •  apixaban (ELIQUIS) 5 MG tablet tablet, Take 5 mg by mouth 2 (two) times a day.,  Disp: , Rfl:   •  buPROPion XL (WELLBUTRIN XL) 300 MG 24 hr tablet, Take 300 mg by mouth Every Morning., Disp: , Rfl:   •  doxycycline (VIBRAMYCIN) 100 MG capsule, Take 1 capsule by mouth 2 (Two) Times a Day for 30 days., Disp: 60 capsule, Rfl: 0  •  glucose blood test strip, Test blood sugar 3 times a day, Disp: 100 each, Rfl: 5  •  HYDROcodone-acetaminophen (Norco) 5-325 MG per tablet, Take 1 tablet by mouth Every 4 (Four) Hours As Needed for Moderate Pain  for up to 18 doses., Disp: 18 tablet, Rfl: 0  •  insulin aspart (NovoLOG) 100 UNIT/ML injection, Inject 20 Units under the skin into the appropriate area as directed 3 (Three) Times a Day Before Meals. Take up to 20 units as instructed, Disp: 20 mL, Rfl: 3  •  insulin detemir (LEVEMIR) 100 UNIT/ML injection, Inject 50 Units under the skin into the appropriate area as directed Every Night. Take 40 units and adjust to 50 as instructed, Disp: 20 mL, Rfl: 3  •  lisinopril (PRINIVIL,ZESTRIL) 20 MG tablet, Take 20 mg by mouth Daily., Disp: , Rfl:   •  metFORMIN (GLUCOPHAGE) 1000 MG tablet, Take 1,000 mg by mouth 2 (two) times a day., Disp: , Rfl:   •  metoprolol succinate XL (TOPROL-XL) 50 MG 24 hr tablet, Take 50 mg by mouth Daily., Disp: , Rfl:   •  sertraline (ZOLOFT) 100 MG tablet, Take 100 mg by mouth Daily., Disp: , Rfl:   •  silver sulfadiazine (Silvadene) 1 % cream, Apply 1 application topically to the appropriate area as directed Daily., Disp: 50 g, Rfl: 1  •  simvastatin (ZOCOR) 20 MG tablet, Take 20 mg by mouth Every Night., Disp: , Rfl:   •  sodium hypochlorite (DAKIN'S 1/4 STRENGTH) 0.125 % solution topical solution 0.125%, Apply 10 mL topically to the appropriate area as directed 2 (Two) Times a Day., Disp: 1000 mL, Rfl: 1  •  Trulicity 1.5 MG/0.5ML solution pen-injector, INJECT ONE SYRINGE UNDER THE SKIN ONCE WEEKLY **APPOINTMENT NEEDED FOR MORE REFILLS**, Disp: 6 mL, Rfl: 2    Review of Systems     Objective     There were no vitals filed for this  visit.  There is no height or weight on file to calculate BMI.    Physical Exam    Result Review :{Labs  Result Review  Imaging  Med Tab  Media :23}   The following data was reviewed by: Uyen Dillon MA on 10/10/2022:    Most Recent A1C    HGBA1C Most Recent 10/1/22   Hemoglobin A1C 7.30 (A)   (A) Abnormal value              A1C Last 3 Results    HGBA1C Last 3 Results 10/1/22   Hemoglobin A1C 7.30 (A)   (A) Abnormal value            ***    Glucose   Date Value Ref Range Status   10/05/2022 204 (H) 70 - 99 mg/dL Final     Comment:     Serial Number: [id number]:  [id number]     ***    Creatinine   Date Value Ref Range Status   10/05/2022 0.71 (L) 0.76 - 1.27 mg/dL Final   10/04/2022 0.70 (L) 0.76 - 1.27 mg/dL Final     eGFR   Date Value Ref Range Status   10/05/2022 103.1 >60.0 mL/min/1.73 Final     Comment:     National Kidney Foundation and American Society of Nephrology (ASN) Task Force recommended calculation based on the Chronic Kidney Disease Epidemiology Collaboration (CKD-EPI) equation refit without adjustment for race.   10/04/2022 103.5 >60.0 mL/min/1.73 Final     Comment:     National Kidney Foundation and American Society of Nephrology (ASN) Task Force recommended calculation based on the Chronic Kidney Disease Epidemiology Collaboration (CKD-EPI) equation refit without adjustment for race.     ***    No results found for: MICROALBUR  No results found for: CREATININEUR  No results found for: MALBCRERATIO  ***    No results found for: CHOL  No results found for: TRIG  No results found for: HDL  No results found for: LDL  ***       {CC Problem List  Visit Diagnosis  ROS  Review (Popup)  Health Maintenance  Quality  BestPractice  Medications  SmartSets  SnapShot Encounters  Media :23}     Assessment: ***      There are no diagnoses linked to this encounter.    Plan: ***    The patient will monitor his blood glucose levels ***.  If he develops problematic hyperglycemia or hypoglycemia  or adverse drug reactions, he will contact the office for further instructions.    {Time Spent (Optional):69823}    Follow Up {Instructions Charge Capture  Follow-up Communications :23}    No follow-ups on file.    Patient was given instructions and counseling regarding his condition or for health maintenance advice. Please see specific information pulled into the AVS if appropriate.     Uyen Dillon MA  10/10/2022      Dictated Utilizing Dragon Dictation.  Please note that portions of this note were completed with a voice recognition program.  Part of this note may be an electronic transcription/translation of spoken language to printed text using the Dragon Dictation System.

## 2022-10-18 ENCOUNTER — READMISSION MANAGEMENT (OUTPATIENT)
Dept: CALL CENTER | Facility: HOSPITAL | Age: 62
End: 2022-10-18

## 2022-10-18 NOTE — OUTREACH NOTE
Medical Week 2 Survey    Flowsheet Row Responses   [facility] patient discharged from? [facility]   Does the patient have one of the following disease processes/diagnoses(primary or secondary)? Other   Week 2 attempt successful? Yes   Call start time 1515   Discharge diagnosis Osteomyelitis of foot, right, acute   Call end time 1517   Meds reviewed with patient/caregiver? Yes   Is the patient having any side effects they believe may be caused by any medication additions or changes? No   Does the patient have all medications ordered at discharge? Yes   Is the patient taking all medications as directed (includes completed medication regime)? Yes   Does the patient have a primary care provider?  Yes   Does the patient have an appointment with their PCP within 7 days of discharge? No   What is preventing the patient from scheduling follow up appointments within 7 days of discharge? Earlier appointment not available   Has the patient kept scheduled appointments due by today? N/A   Has home health visited the patient within 72 hours of discharge? N/A   Psychosocial issues? No   Did the patient receive a copy of their discharge instructions? Yes   Nursing interventions Reviewed instructions with patient   What is the patient's perception of their health status since discharge? Improving   Is the patient/caregiver able to teach back the hierarchy of who to call/visit for symptoms/problems? PCP, Specialist, Home health nurse, Urgent Care, ED, 911 Yes   If the patient is a current smoker, are they able to teach back resources for cessation? Not a smoker   Week 2 Call Completed? Yes          HAROLDO HOOD - Registered Nurse

## 2022-10-20 ENCOUNTER — OFFICE VISIT (OUTPATIENT)
Dept: WOUND CARE | Facility: HOSPITAL | Age: 62
End: 2022-10-20

## 2022-10-20 VITALS
HEART RATE: 93 BPM | RESPIRATION RATE: 18 BRPM | SYSTOLIC BLOOD PRESSURE: 138 MMHG | TEMPERATURE: 97.8 F | DIASTOLIC BLOOD PRESSURE: 78 MMHG

## 2022-10-20 DIAGNOSIS — T81.31XD POSTOPERATIVE WOUND DEHISCENCE, SUBSEQUENT ENCOUNTER: ICD-10-CM

## 2022-10-20 DIAGNOSIS — E66.01 CLASS 3 SEVERE OBESITY DUE TO EXCESS CALORIES WITH SERIOUS COMORBIDITY AND BODY MASS INDEX (BMI) OF 45.0 TO 49.9 IN ADULT: ICD-10-CM

## 2022-10-20 DIAGNOSIS — S98.311A: ICD-10-CM

## 2022-10-20 DIAGNOSIS — M86.671 OTHER CHRONIC OSTEOMYELITIS, RIGHT ANKLE AND FOOT: ICD-10-CM

## 2022-10-20 DIAGNOSIS — L97.424 DIABETIC ULCER OF LEFT HEEL ASSOCIATED WITH TYPE 2 DIABETES MELLITUS, WITH NECROSIS OF BONE: Primary | ICD-10-CM

## 2022-10-20 DIAGNOSIS — E11.42 DIABETIC POLYNEUROPATHY ASSOCIATED WITH TYPE 2 DIABETES MELLITUS: ICD-10-CM

## 2022-10-20 DIAGNOSIS — E11.621 DIABETIC ULCER OF LEFT HEEL ASSOCIATED WITH TYPE 2 DIABETES MELLITUS, WITH NECROSIS OF BONE: Primary | ICD-10-CM

## 2022-10-20 DIAGNOSIS — Z60.9 HIGH RISK SOCIAL SITUATION: ICD-10-CM

## 2022-10-20 PROCEDURE — 99215 OFFICE O/P EST HI 40 MIN: CPT | Performed by: EMERGENCY MEDICINE

## 2022-10-20 PROCEDURE — 11042 DBRDMT SUBQ TIS 1ST 20SQCM/<: CPT | Performed by: EMERGENCY MEDICINE

## 2022-10-20 PROCEDURE — 97597 DBRDMT OPN WND 1ST 20 CM/<: CPT | Performed by: EMERGENCY MEDICINE

## 2022-10-20 SDOH — SOCIAL STABILITY - SOCIAL INSECURITY: PROBLEM RELATED TO SOCIAL ENVIRONMENT, UNSPECIFIED: Z60.9
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
## 2022-10-27 ENCOUNTER — READMISSION MANAGEMENT (OUTPATIENT)
Dept: CALL CENTER | Facility: HOSPITAL | Age: 62
End: 2022-10-27

## 2022-10-27 NOTE — OUTREACH NOTE
Medical Week 3 Survey    Flowsheet Row Responses   [facility] patient discharged from? [facility]   Does the patient have one of the following disease processes/diagnoses(primary or secondary)? Other   Week 3 attempt successful? Yes   Call start time 1106   Call end time 1107   Discharge diagnosis Osteomyelitis of foot, right, acute   Meds reviewed with patient/caregiver? Yes   Is the patient taking all medications as directed (includes completed medication regime)? Yes   Comments regarding PCP New PCP appt is on 11/1/22   Has the patient kept scheduled appointments due by today? N/A   What is the patient's perception of their health status since discharge? Returned to baseline/stable   Week 3 Call Completed? Yes   Revoked No further contact(revokes)-requires comment   Graduated/Revoked comments Pt did not want further calls          DAYRON GRAFF - Registered Nurse

## 2022-11-03 ENCOUNTER — TELEPHONE (OUTPATIENT)
Dept: WOUND CARE | Facility: HOSPITAL | Age: 62
End: 2022-11-03

## 2022-11-03 NOTE — TELEPHONE ENCOUNTER
ASMITA CALLED TO GET A VERBAL ORDER TO CONTACT A  TO ASSIST PATIENT WITH HIS HOUSING NEEDS. VERBAL ORDER WAS GIVEN.

## 2022-11-08 ENCOUNTER — OFFICE VISIT (OUTPATIENT)
Dept: INTERNAL MEDICINE | Facility: CLINIC | Age: 62
End: 2022-11-08

## 2022-11-08 VITALS
BODY MASS INDEX: 40.43 KG/M2 | HEIGHT: 74 IN | WEIGHT: 315 LBS | RESPIRATION RATE: 18 BRPM | DIASTOLIC BLOOD PRESSURE: 78 MMHG | HEART RATE: 94 BPM | SYSTOLIC BLOOD PRESSURE: 122 MMHG | TEMPERATURE: 97.5 F | OXYGEN SATURATION: 97 %

## 2022-11-08 DIAGNOSIS — E11.621 DIABETIC ULCER OF RIGHT MIDFOOT ASSOCIATED WITH TYPE 2 DIABETES MELLITUS, WITH MUSCLE INVOLVEMENT WITHOUT EVIDENCE OF NECROSIS: ICD-10-CM

## 2022-11-08 DIAGNOSIS — L97.415 DIABETIC ULCER OF RIGHT MIDFOOT ASSOCIATED WITH TYPE 2 DIABETES MELLITUS, WITH MUSCLE INVOLVEMENT WITHOUT EVIDENCE OF NECROSIS: ICD-10-CM

## 2022-11-08 DIAGNOSIS — L97.509 TYPE 2 DIABETES MELLITUS WITH FOOT ULCER, WITH LONG-TERM CURRENT USE OF INSULIN: ICD-10-CM

## 2022-11-08 DIAGNOSIS — I48.0 PAROXYSMAL ATRIAL FIBRILLATION: ICD-10-CM

## 2022-11-08 DIAGNOSIS — E66.01 CLASS 3 SEVERE OBESITY DUE TO EXCESS CALORIES WITH SERIOUS COMORBIDITY AND BODY MASS INDEX (BMI) OF 45.0 TO 49.9 IN ADULT: Chronic | ICD-10-CM

## 2022-11-08 DIAGNOSIS — I10 ESSENTIAL HYPERTENSION: Primary | ICD-10-CM

## 2022-11-08 DIAGNOSIS — E11.621 TYPE 2 DIABETES MELLITUS WITH FOOT ULCER, WITH LONG-TERM CURRENT USE OF INSULIN: ICD-10-CM

## 2022-11-08 DIAGNOSIS — Z79.4 TYPE 2 DIABETES MELLITUS WITH FOOT ULCER, WITH LONG-TERM CURRENT USE OF INSULIN: ICD-10-CM

## 2022-11-08 DIAGNOSIS — M79.2 NEUROPATHIC PAIN: ICD-10-CM

## 2022-11-08 PROBLEM — E11.9 TYPE 2 DIABETES MELLITUS, WITH LONG-TERM CURRENT USE OF INSULIN: Status: ACTIVE | Noted: 2022-11-08

## 2022-11-08 PROBLEM — E66.813 CLASS 3 SEVERE OBESITY DUE TO EXCESS CALORIES WITH SERIOUS COMORBIDITY AND BODY MASS INDEX (BMI) OF 45.0 TO 49.9 IN ADULT: Chronic | Status: ACTIVE | Noted: 2022-11-08

## 2022-11-08 LAB
ALBUMIN SERPL-MCNC: 3.6 G/DL (ref 3.5–5.2)
ALBUMIN/GLOB SERPL: 1.1 G/DL
ALP SERPL-CCNC: 163 U/L (ref 39–117)
ALT SERPL W P-5'-P-CCNC: 74 U/L (ref 1–41)
ANION GAP SERPL CALCULATED.3IONS-SCNC: 10 MMOL/L (ref 5–15)
AST SERPL-CCNC: 77 U/L (ref 1–40)
BASOPHILS # BLD AUTO: 0.03 10*3/MM3 (ref 0–0.2)
BASOPHILS NFR BLD AUTO: 0.7 % (ref 0–1.5)
BILIRUB SERPL-MCNC: 0.7 MG/DL (ref 0–1.2)
BUN SERPL-MCNC: 13 MG/DL (ref 8–23)
BUN/CREAT SERPL: 21 (ref 7–25)
CALCIUM SPEC-SCNC: 9.4 MG/DL (ref 8.6–10.5)
CHLORIDE SERPL-SCNC: 102 MMOL/L (ref 98–107)
CHOLEST SERPL-MCNC: 162 MG/DL (ref 0–200)
CO2 SERPL-SCNC: 24 MMOL/L (ref 22–29)
CREAT SERPL-MCNC: 0.62 MG/DL (ref 0.76–1.27)
DEPRECATED RDW RBC AUTO: 43 FL (ref 37–54)
EGFRCR SERPLBLD CKD-EPI 2021: 106.7 ML/MIN/1.73
EOSINOPHIL # BLD AUTO: 0.06 10*3/MM3 (ref 0–0.4)
EOSINOPHIL NFR BLD AUTO: 1.4 % (ref 0.3–6.2)
ERYTHROCYTE [DISTWIDTH] IN BLOOD BY AUTOMATED COUNT: 14.2 % (ref 12.3–15.4)
GLOBULIN UR ELPH-MCNC: 3.3 GM/DL
GLUCOSE SERPL-MCNC: 241 MG/DL (ref 65–99)
HCT VFR BLD AUTO: 43.6 % (ref 37.5–51)
HDLC SERPL-MCNC: 61 MG/DL (ref 40–60)
HGB BLD-MCNC: 13.9 G/DL (ref 13–17.7)
IMM GRANULOCYTES # BLD AUTO: 0.01 10*3/MM3 (ref 0–0.05)
IMM GRANULOCYTES NFR BLD AUTO: 0.2 % (ref 0–0.5)
LDLC SERPL CALC-MCNC: 89 MG/DL (ref 0–100)
LDLC/HDLC SERPL: 1.46 {RATIO}
LYMPHOCYTES # BLD AUTO: 0.74 10*3/MM3 (ref 0.7–3.1)
LYMPHOCYTES NFR BLD AUTO: 16.8 % (ref 19.6–45.3)
MCH RBC QN AUTO: 26.9 PG (ref 26.6–33)
MCHC RBC AUTO-ENTMCNC: 31.9 G/DL (ref 31.5–35.7)
MCV RBC AUTO: 84.3 FL (ref 79–97)
MONOCYTES # BLD AUTO: 0.37 10*3/MM3 (ref 0.1–0.9)
MONOCYTES NFR BLD AUTO: 8.4 % (ref 5–12)
NEUTROPHILS NFR BLD AUTO: 3.2 10*3/MM3 (ref 1.7–7)
NEUTROPHILS NFR BLD AUTO: 72.5 % (ref 42.7–76)
NRBC BLD AUTO-RTO: 0 /100 WBC (ref 0–0.2)
PLATELET # BLD AUTO: 82 10*3/MM3 (ref 140–450)
PMV BLD AUTO: 12.6 FL (ref 6–12)
POTASSIUM SERPL-SCNC: 4.7 MMOL/L (ref 3.5–5.2)
PROT SERPL-MCNC: 6.9 G/DL (ref 6–8.5)
RBC # BLD AUTO: 5.17 10*6/MM3 (ref 4.14–5.8)
SODIUM SERPL-SCNC: 136 MMOL/L (ref 136–145)
TRIGL SERPL-MCNC: 60 MG/DL (ref 0–150)
TSH SERPL DL<=0.05 MIU/L-ACNC: 1.75 UIU/ML (ref 0.27–4.2)
VLDLC SERPL-MCNC: 12 MG/DL (ref 5–40)
WBC NRBC COR # BLD: 4.41 10*3/MM3 (ref 3.4–10.8)

## 2022-11-08 PROCEDURE — 80061 LIPID PANEL: CPT | Performed by: NURSE PRACTITIONER

## 2022-11-08 PROCEDURE — 84443 ASSAY THYROID STIM HORMONE: CPT | Performed by: NURSE PRACTITIONER

## 2022-11-08 PROCEDURE — 99204 OFFICE O/P NEW MOD 45 MIN: CPT | Performed by: NURSE PRACTITIONER

## 2022-11-08 PROCEDURE — 85025 COMPLETE CBC W/AUTO DIFF WBC: CPT | Performed by: NURSE PRACTITIONER

## 2022-11-08 PROCEDURE — 80053 COMPREHEN METABOLIC PANEL: CPT | Performed by: NURSE PRACTITIONER

## 2022-11-08 PROCEDURE — 36415 COLL VENOUS BLD VENIPUNCTURE: CPT | Performed by: NURSE PRACTITIONER

## 2022-11-08 RX ORDER — DOXYCYCLINE HYCLATE 100 MG/1
100 CAPSULE ORAL 2 TIMES DAILY
COMMUNITY
End: 2023-01-27

## 2022-11-08 RX ORDER — AMOXICILLIN AND CLAVULANATE POTASSIUM 875; 125 MG/1; MG/1
1 TABLET, FILM COATED ORAL 2 TIMES DAILY
COMMUNITY
End: 2023-01-27

## 2022-11-08 RX ORDER — PEN NEEDLE, DIABETIC 32GX 5/32"
NEEDLE, DISPOSABLE MISCELLANEOUS
COMMUNITY
Start: 2022-08-30

## 2022-11-08 NOTE — PROGRESS NOTES
"Chief Complaint  Establish Care, Annual Exam, Diabetes, Hypertension, and Hyperlipidemia    Subjective          Jose Shaikh presents to [facility]  History of Present Illness  Patient here today for establish care visit.  Previously saw Dr. Gr  DM-sees Kami Garcia, difficulty making to appts; plans to start using a CGM  Reports that his pastors father takes him to appts  He does not wish to have additional resources such as tack    He has had toes amputated, he is still seeing Dr. Daniels with wound care. He reports still walking at times at home. Mostly uses the wheelchair  Has home health nurse that comes in on thurs that he does not go to woundcare    He reports more issues with neuropathy in feet and hands  He does not wish to take other medications to help with this    Afib-on eliquis  Sees Dr. Duval      Objective   Vital Signs:   /78 (BP Location: Right arm, Patient Position: Sitting, Cuff Size: Adult)   Pulse 94   Temp 97.5 °F (36.4 °C)   Resp 18   Ht 188 cm (74.02\")   Wt (!) 161 kg (355 lb)   SpO2 97%   BMI 45.56 kg/m²     Physical Exam   Result Review :          Procedures      Assessment and Plan    Diagnoses and all orders for this visit:    1. Essential hypertension (Primary)  Comments:  Well-controlled.  Continue current meds.    2. Type 2 diabetes mellitus with foot ulcer, with long-term current use of insulin (HCC)  Comments:  Managed by Ms. Garcia.  Eating habits discussed at length  Orders:  -     CBC & Differential  -     Comprehensive Metabolic Panel  -     Lipid Panel  -     TSH    3. Paroxysmal atrial fibrillation  Comments:  Rate controlled today.  Continuing on Eliquis    4. Diabetic ulcer of right midfoot associated with type 2 DM  Comments:  We will continue seeing wound care and Dr. Daniels    5. Neuropathic pain  Comments:  Has previously tried gabapentin another medications but prefers not to take these.    6. Class 3 " severe obesity due to excess calories with serious comorbidity and body mass index (BMI) of 45.0 to 49.9 in adult ([facility])  Assessment & Plan:  Patient's (Body mass index is 45.56 kg/m².) indicates that they are morbidly obese (BMI > 40 or > 35 with obesity - related health condition) with health conditions that include hypertension and diabetes mellitus . Weight is newly identified. BMI is is above average; BMI management plan is completed. We discussed low calorie, low carb based diet program, portion control and increasing exercise. Discussed weight loss benefits of trulicity. Discussed options for increasing activity without weight bearing. Recommending arm exercises while watching tv      Other orders  -     Diclofenac Sodium (VOLTAREN) 1 % gel gel; Apply 4 g topically to the appropriate area as directed 4 (Four) Times a Day As Needed (hip pain).  Dispense: 100 g; Refill: 2            Follow Up   Return in about 2 months (around 1/8/2023) for Medicare Wellness.  Patient was given instructions and counseling regarding his condition or for health maintenance advice. Please see specific information pulled into the AVS if appropriate.

## 2022-11-08 NOTE — ASSESSMENT & PLAN NOTE
Patient's (Body mass index is 45.56 kg/m².) indicates that they are morbidly obese (BMI > 40 or > 35 with obesity - related health condition) with health conditions that include hypertension and diabetes mellitus . Weight is newly identified. BMI is is above average; BMI management plan is completed. We discussed low calorie, low carb based diet program, portion control and increasing exercise. Discussed weight loss benefits of trulicity. Discussed options for increasing activity without weight bearing. Recommending arm exercises while watching tv

## 2022-11-10 ENCOUNTER — OFFICE VISIT (OUTPATIENT)
Dept: WOUND CARE | Facility: HOSPITAL | Age: 62
End: 2022-11-10

## 2022-11-10 VITALS
HEART RATE: 89 BPM | TEMPERATURE: 96.9 F | DIASTOLIC BLOOD PRESSURE: 72 MMHG | RESPIRATION RATE: 18 BRPM | SYSTOLIC BLOOD PRESSURE: 122 MMHG

## 2022-11-10 DIAGNOSIS — S98.311A: ICD-10-CM

## 2022-11-10 DIAGNOSIS — L97.421 DIABETIC ULCER OF LEFT HEEL ASSOCIATED WITH TYPE 2 DIABETES MELLITUS, LIMITED TO BREAKDOWN OF SKIN: Primary | ICD-10-CM

## 2022-11-10 DIAGNOSIS — E11.42 DIABETIC POLYNEUROPATHY ASSOCIATED WITH TYPE 2 DIABETES MELLITUS: ICD-10-CM

## 2022-11-10 DIAGNOSIS — Z60.9 HIGH RISK SOCIAL SITUATION: ICD-10-CM

## 2022-11-10 DIAGNOSIS — Z13.9 ENCOUNTER FOR SCREENING INVOLVING SOCIAL DETERMINANTS OF HEALTH (SDOH): ICD-10-CM

## 2022-11-10 DIAGNOSIS — E66.01 CLASS 3 SEVERE OBESITY DUE TO EXCESS CALORIES WITH SERIOUS COMORBIDITY AND BODY MASS INDEX (BMI) OF 45.0 TO 49.9 IN ADULT: ICD-10-CM

## 2022-11-10 DIAGNOSIS — T81.31XD POSTOPERATIVE WOUND DEHISCENCE, SUBSEQUENT ENCOUNTER: ICD-10-CM

## 2022-11-10 DIAGNOSIS — E11.621 DIABETIC ULCER OF LEFT HEEL ASSOCIATED WITH TYPE 2 DIABETES MELLITUS, LIMITED TO BREAKDOWN OF SKIN: Primary | ICD-10-CM

## 2022-11-10 PROCEDURE — 97597 DBRDMT OPN WND 1ST 20 CM/<: CPT | Performed by: EMERGENCY MEDICINE

## 2022-11-10 PROCEDURE — 11042 DBRDMT SUBQ TIS 1ST 20SQCM/<: CPT | Performed by: EMERGENCY MEDICINE

## 2022-11-10 SDOH — SOCIAL STABILITY - SOCIAL INSECURITY: PROBLEM RELATED TO SOCIAL ENVIRONMENT, UNSPECIFIED: Z60.9

## 2022-11-10 NOTE — PROGRESS NOTES
"Chief Complaint  Wound Check (WOUND CHECK TO LEFT FOOT ())    Subjective      Wound Check        Jose Shaikh  is a 62 y.o. diabetic male with a right MTT amputation that is healing by secondary intention.  He also has a wound on the left plantar foot.  He has a difficult social situation due to financial constraints and housing issues.      He was recently admitted to the hospital from 10/01 to 10/05/2021 for cellulitis and osteomyelitis of the right foot.  MRI revealed osteomyelitis of bones of the midfoot.  CTA showed two-vessel runoff to the foot.  Dr. Fuller did not feel that there was any vascular intervention that would help the patient heal better and that he would most likely need to proceed to a right sided BKA.  Patient declined at this time and said he has been walking on it too much and would try to do better to avoid an amputation.  He was sent home on 4 weeks of Augmentin and doxycycline.    He had a nurse from his [facility] coming to do his dressing changes but he is doing them himself now.  He is applying Padmini Ag to both foot wounds every other day.  He says \"I am too lazy to do it every day.\"  He mostly uses a wheelchair to get around and tries to stay off his feet is much as possible. He does have to walk on it sometimes because his wheelchair and walker do not fit in the bathroom.     Patient still has a severe housing crisis.  I have been in contact with social work as have the nurses to try and see what options we have to assist him.  Unfortunately he has a very difficult situation with his prior felony conviction and significant financial constraints.  He is not sure if his wounds are getting better, as he is unable to see them.    Allergies:  Adhesive tape      Current Outpatient Medications:   •  alfuzosin (UROXATRAL) 10 MG 24 hr tablet, Take 10 mg by mouth Daily., Disp: , Rfl:   •  amoxicillin-clavulanate (AUGMENTIN) 875-125 MG per tablet, Take 1 tablet by mouth 2 (Two) Times a Day., " Disp: , Rfl:   •  apixaban (ELIQUIS) 5 MG tablet tablet, Take 5 mg by mouth 2 (two) times a day., Disp: , Rfl:   •  Diclofenac Sodium (VOLTAREN) 1 % gel gel, Apply 4 g topically to the appropriate area as directed 4 (Four) Times a Day As Needed (hip pain)., Disp: 100 g, Rfl: 2  •  doxycycline (VIBRAMYCIN) 100 MG capsule, Take 1 capsule by mouth 2 (Two) Times a Day., Disp: , Rfl:   •  glucose blood test strip, Test blood sugar 3 times a day, Disp: 100 each, Rfl: 5  •  insulin aspart (NovoLOG) 100 UNIT/ML injection, Inject 20 Units under the skin into the appropriate area as directed 3 (Three) Times a Day Before Meals. Take up to 20 units as instructed, Disp: 20 mL, Rfl: 3  •  insulin detemir (LEVEMIR) 100 UNIT/ML injection, Inject 50 Units under the skin into the appropriate area as directed Every Night. Take 40 units and adjust to 50 as instructed (Patient taking differently: Inject 50 Units under the skin into the appropriate area as directed Every Night.), Disp: 20 mL, Rfl: 3  •  lisinopril (PRINIVIL,ZESTRIL) 20 MG tablet, Take 20 mg by mouth Daily., Disp: , Rfl:   •  metFORMIN (GLUCOPHAGE) 1000 MG tablet, Take 1,000 mg by mouth 2 (two) times a day., Disp: , Rfl:   •  metoprolol succinate XL (TOPROL-XL) 50 MG 24 hr tablet, Take 50 mg by mouth Daily., Disp: , Rfl:   •  Pro Comfort Lancets 31G misc, , Disp: , Rfl:   •  simvastatin (ZOCOR) 20 MG tablet, Take 20 mg by mouth Every Night., Disp: , Rfl:   •  sodium hypochlorite (DAKIN'S 1/4 STRENGTH) 0.125 % solution topical solution 0.125%, Apply 10 mL topically to the appropriate area as directed 2 (Two) Times a Day., Disp: 1000 mL, Rfl: 1  •  Trulicity 1.5 MG/0.5ML solution pen-injector, INJECT ONE SYRINGE UNDER THE SKIN ONCE WEEKLY **APPOINTMENT NEEDED FOR MORE REFILLS**, Disp: 6 mL, Rfl: 2    Past Medical History:   Diagnosis Date   • Absence of toe of right foot    • Acute osteomyelitis of left calcaneus  8/18/2021   • Anxiety and depression    • Arthritis    •  Claustrophobia    • Corns and callus    • Diabetic ulcer of left heel associated with type 2 DM 2021   • Diabetic ulcer of left heel associated with type 2 DM 2021   • Diabetic ulcer of right midfoot associated with type 2 DM 2021   • Difficulty walking    • Essential hypertension 2021   • Hammertoe    • Hyperlipidemia LDL goal <100 2021   • Ingrown toenail    • Obesity    • Paroxysmal atrial fibrillation 2021   • Polyneuropathy    • Pressure ulcer, stage 1    • Tinea unguium    • Type 2 diabetes mellitus with polyneuropathy      Past Surgical History:   Procedure Laterality Date   • CYST REMOVAL      center of back; benign   • INCISION AND DRAINAGE ABSCESS      back   • INCISION AND DRAINAGE LEG Right 12/10/2021    Procedure: INCISION AND DRAINAGE LOWER EXTREMITY;  Surgeon: Ash Leyav DPM;  Location: [facility];  Service: Podiatry;  Laterality: Right;   • OTHER SURGICAL HISTORY      Surgical clips left foot   • TOE SURGERY Right     Removal of 5th toe   • TRANS METATARSAL AMPUTATION Right 2021    Procedure: AMPUTATION TRANS METATARSAL;  Surgeon: Ash Leyva DPM;  Location: [facility];  Service: Podiatry;  Laterality: Right;   • WRIST SURGERY Left     repair of injury     Social History     Socioeconomic History   • Marital status: Single   Tobacco Use   • Smoking status: Former     Packs/day: 0.00     Years: 1.00     Pack years: 0.00     Types: Cigarettes     Quit date: 1991     Years since quittin.2   • Smokeless tobacco: Never   • Tobacco comments:     quit at age 30   Vaping Use   • Vaping Use: Never used   Substance and Sexual Activity   • Alcohol use: Yes     Comment: Rare   • Drug use: Yes     Types: Marijuana     Comment: Daily   • Sexual activity: Defer           Objective     Vitals:    11/10/22 1441   BP: 122/72   BP Location: Left arm   Patient Position: Sitting   Pulse: 89   Resp: 18   Temp: 96.9 °F (36.1 °C)   TempSrc:  Temporal   PainSc: 0-No pain     There is no height or weight on file to calculate BMI.     Height 74 inches, weight 355 pounds, BMI 45.56    STEADI Fall Risk Assessment has not been completed.     Review of Systems     ROS:  No fevers, chills, sweats, or body aches.     I have reviewed the HPI and ROS as documented by MA/RN. Shea Daniels MD    Physical Exam     NAD  Normocephalic, atraumatic  EOMI, no scleral icterus  No respiratory distress, no cough  AAOx3, pleasant, cooperative  Left plantar wound with severe recurrent callus and deep hemosiderin deposition, somewhat less.  TTP with deep palpation laterally but no pain with sharp debridement.  Wound largely unchanged.  No evidence of infection.   Right foot wound base is similar but there is decreased periwound maceration at today's visit.  There continues to be hemosiderin deposition and evidence of pressure injury of the central tissues of the wound and severe deeper pressure injury under the callus noted during debridement.  No TTP.  No evidence of infection.    See photos for details.    RLE:          LLE:            Result Review :  The following data was reviewed by: Shea Daniels MD on 11/10/2022:    Prior notes and images.          Wound debridement  Performed by: Shea Daniels MD  Authorized by: Shea Daniels MD     Correct patient: Identification verified by two methods:     Verbally and Date of birth  Correct procedure/consent    Correct site/side    Correct equipment as applicable    How many wounds are you performing a debridement on?:  2  Wound 1:     Nursing Documentation:     Wound Location:  Right foot  Measurements:     The total amount debrided on this wound was under 20 cm2.     Provider Documentation    Debridement type:  Sharp/excisional    Betadine      Other:  Sterile pickups and 10 blade     None    Tissue debrided:  Large    Type tissue:  Slough and Eschar    Level removed:  Subcutaneous    Patient tolerance:  Good     Hemostasis achieved by:  Direct pressure and Silver nitrate    Wound Bed Post Debridement: See Photo    Wound 2:     Wound Location:  Left foot   Measurements:    The total amount debrided on this wound was under 20 cm2.      Provider Documentation:     Debridement type:  Sharp/Excisional    Betadine      Other:  Sterile pickups and 10 blade     None    Tissue debrided:  Large    Level removed:  Skin    Patient tolerance:  Good    Hemostasis achieved by:  Direct Pressure and Silver Nitrate    Wound Bed Post Debridement: See Photo                         Assessment and Plan   Diagnoses and all orders for this visit:    1. Diabetic ulcer of left heel associated with type 2 diabetes mellitus, limited to breakdown of skin ([facility]) (Primary)    2. Postoperative wound dehiscence, subsequent encounter    3. Amputation of right midfoot ([facility])    4. Diabetic polyneuropathy associated with type 2 diabetes mellitus ([facility])    5. Class 3 severe obesity due to excess calories with serious comorbidity and body mass index (BMI) of 45.0 to 49.9 in adult ([facility])    6. High risk social situation    7. Encounter for screening involving social determinants of health ([facility])    Other orders  -     Wound debridement        He can continue applying Padmini Ag applied to both plantar foot wounds.  This should be done daily.  He is to change the outer gauze bandage at least once a day if not twice a day to try and remove moisture from the area and improve the maceration of the tissues.  He has continued to do the dressings every other day which is keeping too much moisture in the tissues, although the maceration is better than at his last visit.    Offload distal right foot at all times!  This is very challenging for him to do but is very important in determining if he is able to heel and keep what is left of his foot.  He continues to have significant pressure injury and tissue destruction particularly centrally.    Work on improving diabetic  control and blood sugar levels.      Patient continues to have a very challenging and difficult social situation.  Social work has been trying to work with him to help him find appropriate housing.  He says his current landlord is trying to kick him out so that his girlfriend can move in but patient states he will need to go through the appropriate eviction channels to do so.    He does have family in [place] and [place] and has considered moving in with one of his children if necessary to try and get better.    Recheck 4 weeks, sooner if problems arise.    Patient was given instructions and counseling regarding their condition or for health maintenance advice, as well as the wound care plan and recommendations. They understand and agree with the plan.  They will follow back up here in the clinic but are instructed to contact us in the interim should they have any new, returning, or worsening symptoms or concerns. Please see specific information pulled into the AVS if appropriate.     Dragon Dictation utilized for chart completion.      Follow Up   Return in about 4 weeks (around 12/8/2022) for Recheck.      Shea Daniels MD

## 2022-12-06 ENCOUNTER — OFFICE VISIT (OUTPATIENT)
Dept: WOUND CARE | Facility: HOSPITAL | Age: 62
End: 2022-12-06

## 2022-12-06 VITALS
DIASTOLIC BLOOD PRESSURE: 74 MMHG | SYSTOLIC BLOOD PRESSURE: 142 MMHG | TEMPERATURE: 97.6 F | RESPIRATION RATE: 16 BRPM | HEART RATE: 93 BPM

## 2022-12-06 DIAGNOSIS — E11.621 DIABETIC ULCER OF LEFT HEEL ASSOCIATED WITH TYPE 2 DIABETES MELLITUS, LIMITED TO BREAKDOWN OF SKIN: ICD-10-CM

## 2022-12-06 DIAGNOSIS — L97.421 DIABETIC ULCER OF LEFT HEEL ASSOCIATED WITH TYPE 2 DIABETES MELLITUS, LIMITED TO BREAKDOWN OF SKIN: ICD-10-CM

## 2022-12-06 DIAGNOSIS — S98.311A: ICD-10-CM

## 2022-12-06 DIAGNOSIS — E11.42 DIABETIC POLYNEUROPATHY ASSOCIATED WITH TYPE 2 DIABETES MELLITUS: Primary | ICD-10-CM

## 2022-12-06 DIAGNOSIS — T81.31XD POSTOPERATIVE WOUND DEHISCENCE, SUBSEQUENT ENCOUNTER: ICD-10-CM

## 2022-12-06 PROCEDURE — 99213 OFFICE O/P EST LOW 20 MIN: CPT | Performed by: SURGERY

## 2022-12-06 PROCEDURE — 97597 DBRDMT OPN WND 1ST 20 CM/<: CPT | Performed by: SURGERY

## 2022-12-06 NOTE — PROGRESS NOTES
Chief Complaint  Wound Check (WOUND CHECK TO BILATERAL FEET ( NOT MONITORING BS))    Subjective            Objective     Vitals:    12/06/22 1334   BP: 142/74   BP Location: Left arm   Patient Position: Sitting   Pulse: 93   Resp: 16   Temp: 97.6 °F (36.4 °C)   TempSrc: Temporal   PainSc:   7   PainLoc: Foot         I have reviewed the HPI and ROS as documented by MA/RN. Narayan Dumont MD    Physical Exam     Wound Description:  The patient has bilateral foot wounds.  Patient has had previous right foot transmetatarsal amputation.  There is a large open wound where the amputation was performed.  There is a large callus around the wound site which I excised today.  The patient has been applying Padmini to the wound for fairly lengthy period of time.  I have asked him to discontinue the use of Padmini and to have Betadine soaked gauze applied to the wound site and then wrapped the right lower extremity in a Unna boot.  We will have this left on for approximately 10 days and see the patient again next Friday.  Patient also has a diabetic ulcer on the heel of his left foot.  I excised the callus around this wound site and of asked the patient to continue to apply Padmini to that wound.    Result Review :   The following data was reviewed by: Narayan Dumont MD on 12/06/2022:      Wound debridement  Performed by: Narayan Dumont MD  Authorized by: Narayan Dumont MD     How many wounds are you performing a debridement on?:  2  Wound 1:     Nursing Documentation:   Measurements:     The total amount debrided on this wound was under 20 cm2.     Provider Documentation    Other:  Alcohol    Other:  10.  Scalpel     None    Tissue debrided:  Large    Type tissue:  Slough    Level removed:  Skin    Patient tolerance:  Good    Hemostasis achieved by:  Silver nitrate    Wound Bed Post Debridement: See Photo      Description:  The total surface area of the wound debrided on the transverse metatarsal amputation site of the  right foot was less than 20 cm².  Wound 2:    Measurements:    The total amount debrided on this wound was under 20 cm2.      Provider Documentation:     Other:  Alcohol    Other:  10.  Scalpel     None    Tissue debrided:  Moderate    Type tissue:  Slough    Level removed:  Skin    Patient tolerance:  Good    Description:  The total surface area of the wound debrided on the heel of the left foot was less than 20 cm².            Assessment and Plan    Diagnoses and all orders for this visit:    1. Diabetic ulcer of left heel associated with type 2 diabetes mellitus, limited to breakdown of skin (HCC) (Primary)    2. Postoperative wound dehiscence, subsequent encounter    3. Amputation of right midfoot (HCC)    4. Diabetic polyneuropathy associated with type 2 diabetes mellitus (HCC)    Other orders  -     Wound debridement            Follow Up   Return in about 1 week (around 12/13/2022).  Patient was given instructions and counseling regarding his condition or for health maintenance advice. Please see specific information pulled into the AVS if appropriate.

## 2022-12-16 ENCOUNTER — OFFICE VISIT (OUTPATIENT)
Dept: WOUND CARE | Facility: HOSPITAL | Age: 62
End: 2022-12-16

## 2022-12-16 VITALS
HEART RATE: 87 BPM | TEMPERATURE: 97.5 F | SYSTOLIC BLOOD PRESSURE: 146 MMHG | DIASTOLIC BLOOD PRESSURE: 74 MMHG | RESPIRATION RATE: 18 BRPM

## 2022-12-16 DIAGNOSIS — L97.421 DIABETIC ULCER OF LEFT HEEL ASSOCIATED WITH TYPE 2 DIABETES MELLITUS, LIMITED TO BREAKDOWN OF SKIN: ICD-10-CM

## 2022-12-16 DIAGNOSIS — Z89.431 STATUS POST AMPUTATION OF RIGHT FOOT THROUGH METATARSAL BONE: Primary | ICD-10-CM

## 2022-12-16 DIAGNOSIS — E11.621 DIABETIC ULCER OF LEFT HEEL ASSOCIATED WITH TYPE 2 DIABETES MELLITUS, LIMITED TO BREAKDOWN OF SKIN: ICD-10-CM

## 2022-12-16 PROCEDURE — 87077 CULTURE AEROBIC IDENTIFY: CPT | Performed by: SURGERY

## 2022-12-16 PROCEDURE — 87205 SMEAR GRAM STAIN: CPT | Performed by: SURGERY

## 2022-12-16 PROCEDURE — 87070 CULTURE OTHR SPECIMN AEROBIC: CPT | Performed by: SURGERY

## 2022-12-16 PROCEDURE — 87186 SC STD MICRODIL/AGAR DIL: CPT | Performed by: SURGERY

## 2022-12-16 PROCEDURE — 97597 DBRDMT OPN WND 1ST 20 CM/<: CPT | Performed by: SURGERY

## 2022-12-16 NOTE — PROGRESS NOTES
Chief Complaint  Wound Check (BILATERAL FOOT WOUNDS)    Subjective            Objective     Vitals:    12/16/22 1301   BP: 146/74   BP Location: Left arm   Patient Position: Sitting   Pulse: 87   Resp: 18   Temp: 97.5 °F (36.4 °C)   TempSrc: Temporal   PainSc:   8         I have reviewed the HPI and ROS as documented by MA/RN. Narayan Dumont MD    Physical Exam     Wound Description:  The dehisced wound on the right foot where a transmetatarsal resection was performed appears to be smaller today.  I excised the callus from around the wound site.  I will continue to apply Betadine soaked gauze to the wound site wrapped that area good with gauze and then apply an Unna boot for 1 week.  The diabetic ulcer on the heel of the left foot shows minimal change.  Thick callus around the wound site was excised today.  We will continue to have the patient apply Padmini to that wound on a daily basis.  Asked the patient to return to the clinic in 1 week.  The left heel wound was cultured today.    Result Review :   The following data was reviewed by: Narayan Dumont MD on 12/16/2022:      Wound debridement  Performed by: Narayan Dumont MD  Authorized by: Narayan Dumont MD     Correct patient: Identification verified by two methods:     Verbally and Date of birth  Correct procedure/consent    How many wounds are you performing a debridement on?:  2  Wound 1:     Provider Documentation    Other:  Alcohol    Other:  10.  Scalpel     None    Tissue debrided:  Large    Type tissue:  Slough    Level removed:  Skin    Patient tolerance:  Good    Hemostasis achieved by:  Direct pressure and Silver nitrate    Description:  The total surface area of the wound debrided on the right foot was less than 20 cm².  Wound 2:       Provider Documentation:     Debridement type:  Sharp/Excisional     None    Tissue debrided:  Moderate    Type tissue:  Slough    Level removed:  Skin    Patient tolerance:  Good    Hemostasis achieved by:   Direct Pressure    Description:  Total surface area of the wound debrided on the heel of the left foot was less than 20 cm².            Assessment and Plan    Diagnoses and all orders for this visit:    1. Diabetic ulcer of left heel associated with type 2 diabetes mellitus, limited to breakdown of skin (HCC) (Primary)  -     Wound Culture - Wound, Foot, Left    2. Status post amputation of right foot through metatarsal bone (HCC)    Other orders  -     Wound debridement            Follow Up   Return in about 1 week (around 12/23/2022).  Patient was given instructions and counseling regarding his condition or for health maintenance advice. Please see specific information pulled into the AVS if appropriate.

## 2022-12-19 ENCOUNTER — TELEPHONE (OUTPATIENT)
Dept: INTERNAL MEDICINE | Facility: CLINIC | Age: 62
End: 2022-12-19

## 2022-12-19 DIAGNOSIS — L08.9 WOUND INFECTION: Primary | ICD-10-CM

## 2022-12-19 DIAGNOSIS — T14.8XXA WOUND INFECTION: Primary | ICD-10-CM

## 2022-12-19 LAB
BACTERIA SPEC AEROBE CULT: ABNORMAL
GRAM STN SPEC: ABNORMAL
GRAM STN SPEC: ABNORMAL

## 2022-12-19 RX ORDER — CEFPODOXIME PROXETIL 200 MG/1
400 TABLET, FILM COATED ORAL EVERY 12 HOURS
Qty: 28 TABLET | Refills: 0 | Status: SHIPPED | OUTPATIENT
Start: 2022-12-19 | End: 2022-12-26

## 2022-12-19 NOTE — TELEPHONE ENCOUNTER
Caller: Jose Shaikh    Relationship to patient: Self    Best call back number: [phone]    Patient is needing: PATIENT CALLED TO SEE IF HE COULD BE SEEN SOONER, HIS APPOINTMENT IS SCHEDULED FOR 1.9.2022. PATIENT STATES HE IS IN SEVERE PAIN OF HIS LEFT HIP. HE IS NEEDING TO BE SEEN AND POSSIBLY CALLED IN A MEDICATION.         PLEASE ADVISE

## 2022-12-20 ENCOUNTER — APPOINTMENT (OUTPATIENT)
Dept: GENERAL RADIOLOGY | Facility: HOSPITAL | Age: 62
End: 2022-12-20

## 2022-12-20 ENCOUNTER — HOSPITAL ENCOUNTER (EMERGENCY)
Facility: HOSPITAL | Age: 62
Discharge: HOME | End: 2022-12-20
Attending: EMERGENCY MEDICINE | Admitting: EMERGENCY MEDICINE

## 2022-12-20 VITALS
DIASTOLIC BLOOD PRESSURE: 60 MMHG | TEMPERATURE: 98.2 F | SYSTOLIC BLOOD PRESSURE: 122 MMHG | OXYGEN SATURATION: 99 % | BODY MASS INDEX: 45.58 KG/M2 | HEIGHT: 74 IN | HEART RATE: 96 BPM | RESPIRATION RATE: 18 BRPM
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
## 2022-12-20 DIAGNOSIS — M25.552 LEFT HIP PAIN: ICD-10-CM

## 2022-12-20 DIAGNOSIS — M25.562 ACUTE PAIN OF LEFT KNEE: ICD-10-CM

## 2022-12-20 PROCEDURE — 73502 X-RAY EXAM HIP UNI 2-3 VIEWS: CPT

## 2022-12-20 PROCEDURE — 73562 X-RAY EXAM OF KNEE 3: CPT

## 2022-12-20 PROCEDURE — 99283 EMERGENCY DEPT VISIT LOW MDM: CPT

## 2022-12-20 RX ORDER — MELOXICAM 7.5 MG/1
7.5 TABLET ORAL DAILY
Qty: 10 TABLET | Refills: 0 | Status: SHIPPED | OUTPATIENT
Start: 2022-12-20 | End: 2023-01-27 | Stop reason: SDUPTHER

## 2022-12-20 RX ORDER — DICLOFENAC SODIUM 75 MG/1
75 TABLET, DELAYED RELEASE ORAL 2 TIMES DAILY
Qty: 180 TABLET | Refills: 0 | Status: CANCELLED | OUTPATIENT
Start: 2022-12-20

## 2022-12-20 RX ORDER — OXYCODONE HYDROCHLORIDE AND ACETAMINOPHEN 5; 325 MG/1; MG/1
1 TABLET ORAL ONCE
Status: COMPLETED | OUTPATIENT
Start: 2022-12-20 | End: 2022-12-20

## 2022-12-20 RX ADMIN — OXYCODONE HYDROCHLORIDE AND ACETAMINOPHEN 1 TABLET: 5; 325 TABLET ORAL at 19:32

## 2022-12-20 NOTE — TELEPHONE ENCOUNTER
Spoke with patient who understood. He is currently at the ER now. He said the topical did not work for him.

## 2022-12-20 NOTE — TELEPHONE ENCOUNTER
Pt is calling to see if there is anything other then Ibuprofen he can take for his hip pain to get him to his apt on 01/03

## 2022-12-20 NOTE — TELEPHONE ENCOUNTER
Delia cervantes, VALENTÍN  [facility] 4 hours ago (12:13 PM)     JL  We could try a diclofenac 75mg bidprn instead of ibuprofen.

## 2022-12-21 ENCOUNTER — TELEPHONE (OUTPATIENT)
Dept: INTERNAL MEDICINE | Facility: CLINIC | Age: 62
End: 2022-12-21

## 2022-12-21 NOTE — TELEPHONE ENCOUNTER
Caller: Jose Shaikh    Relationship: Self    Best call back number: [phone]    What is the best time to reach you: ANY    Who are you requesting to speak with (clinical staff, provider,  specific staff member): CLINICAL    What was the call regarding:PATIENT STATED THAT THE LEFT HIP PAIN IS KEEPING HIM AWAKE AND THAT IT MAKE THE THIGH MUSCLE AND LEFT KNEE HURT.     Do you require a callback:YES

## 2022-12-21 NOTE — ED PROVIDER NOTES
Time: 8:38 PM EST    Chief Complaint: Fall, hip and knee pain  History provided by: Patient  History is limited by: N/A     History of Present Illness:  Patient is a 62 y.o. year old male who presents to the emergency department with left hip and knee pain after fall    Patient presents to the emergency department today complaining of left hip and knee pain.  Patient states he had a fall 2 weeks ago.  Patient states the fall occurred because he was trying to move a heavy object any became off balance and fell forward.  Patient states during the fall he initially hit his left hip on the wall and then fell forward and impacted his left knee.  Patient states he has been able to tolerate weightbearing since that time.  Patient states he has been taking ibuprofen at home for pain, but today states she taking Tylenol because ibuprofen was not controlling his pain any longer.  Patient states today when he went to get in the shower he noticed that his left knee seemed very swollen compared to the right states had to come to the emergency department for further evaluation.  Patient does state he has an appointment with his primary care provider on 3 January, but this primary care provider was going to call him in pain medications tomorrow from the fall.  No other complaints.      History provided by:  Patient   used: No          Patient Care Team  Primary Care Provider: Delia Cervantes APRN    Past Medical History:     Allergies   Allergen Reactions   • Adhesive Tape Rash     Past Medical History:   Diagnosis Date   • Absence of toe of right foot    • Acute osteomyelitis of left calcaneus  8/18/2021   • Anxiety and depression    • Arthritis    • Claustrophobia    • Corns and callus    • Diabetic ulcer of left heel associated with type 2 DM 8/18/2021   • Diabetic ulcer of left heel associated with type 2 DM 7/6/2021   • Diabetic ulcer of right midfoot associated with type 2 DM 8/18/2021   • Difficulty  walking    • Essential hypertension 8/31/2021   • Hammertoe    • Hyperlipidemia LDL goal <100 8/31/2021   • Ingrown toenail    • Obesity    • Paroxysmal atrial fibrillation 8/31/2021   • Polyneuropathy    • Pressure ulcer, stage 1    • Tinea unguium    • Type 2 diabetes mellitus with polyneuropathy      Past Surgical History:   Procedure Laterality Date   • CYST REMOVAL      center of back; benign   • INCISION AND DRAINAGE ABSCESS      back   • INCISION AND DRAINAGE LEG Right 12/10/2021    Procedure: INCISION AND DRAINAGE LOWER EXTREMITY;  Surgeon: Ash Leyva DPM;  Location: [facility] MAIN OR;  Service: Podiatry;  Laterality: Right;   • OTHER SURGICAL HISTORY      Surgical clips left foot   • TOE SURGERY Right     Removal of 5th toe   • TRANS METATARSAL AMPUTATION Right 12/2/2021    Procedure: AMPUTATION TRANS METATARSAL;  Surgeon: Ash Leyva DPM;  Location: [facility] MAIN OR;  Service: Podiatry;  Laterality: Right;   • WRIST SURGERY Left     repair of injury     Family History   Problem Relation Age of Onset   • Heart disease Mother    • Heart disease Father    • Cancer Father         Unspecified   • Heart disease Brother        Home Medications:  Prior to Admission medications    Medication Sig Start Date End Date Taking? Authorizing Provider   alfuzosin (UROXATRAL) 10 MG 24 hr tablet Take 10 mg by mouth Daily. 6/11/21   ProviderLaura MD   amoxicillin-clavulanate (AUGMENTIN) 875-125 MG per tablet Take 1 tablet by mouth 2 (Two) Times a Day.    ProviderLaura MD   apixaban (ELIQUIS) 5 MG tablet tablet Take 5 mg by mouth 2 (two) times a day.    ProviderLaura MD   cefpodoxime (VANTIN) 200 MG tablet Take 2 tablets by mouth Every 12 (Twelve) Hours for 7 days. 12/19/22 12/26/22  Shea Daniels MD   Diclofenac Sodium (VOLTAREN) 1 % gel gel Apply 4 g topically to the appropriate area as directed 4 (Four) Times a Day As Needed (hip pain). 11/8/22   Delia Cervantes, APRN    doxycycline (VIBRAMYCIN) 100 MG capsule Take 1 capsule by mouth 2 (Two) Times a Day.    Laura Connor MD   glucose blood test strip Test blood sugar 3 times a day 21   Kami Garcia APRN   insulin aspart (NovoLOG) 100 UNIT/ML injection Inject 20 Units under the skin into the appropriate area as directed 3 (Three) Times a Day Before Meals. Take up to 20 units as instructed 21  Kami Garcia APRN   insulin detemir (LEVEMIR) 100 UNIT/ML injection Inject 50 Units under the skin into the appropriate area as directed Every Night. Take 40 units and adjust to 50 as instructed  Patient taking differently: Inject 50 Units under the skin into the appropriate area as directed Every Night. 21   Kami Garcia APRN   lisinopril (PRINIVIL,ZESTRIL) 20 MG tablet Take 20 mg by mouth Daily.    Laura Connor MD   meloxicam (MOBIC) 7.5 MG tablet Take 1 tablet by mouth Daily. 22   Travis Rao PA-C   metFORMIN (GLUCOPHAGE) 1000 MG tablet Take 1,000 mg by mouth 2 (two) times a day.    Laura Connor MD   metoprolol succinate XL (TOPROL-XL) 50 MG 24 hr tablet Take 50 mg by mouth Daily. 21   Laura Connor MD   Pro Comfort Lancets 31G misc  22   Laura Connor MD   simvastatin (ZOCOR) 20 MG tablet Take 20 mg by mouth Every Night.    Laura Connor MD   sodium hypochlorite (DAKIN'S 1/4 STRENGTH) 0.125 % solution topical solution 0.125% Apply 10 mL topically to the appropriate area as directed 2 (Two) Times a Day. 22   Shea Daniels MD   Trulicity 1.5 MG/0.5ML solution pen-injector INJECT ONE SYRINGE UNDER THE SKIN ONCE WEEKLY **APPOINTMENT NEEDED FOR MORE REFILLS** 22   Kami Garcia APRN        Social History:   Social History     Tobacco Use   • Smoking status: Former     Packs/day: 0.00     Years: 1.00     Pack years: 0.00     Types: Cigarettes     Quit date: 1991     Years since quittin.3   •  "Smokeless tobacco: Never   • Tobacco comments:     quit at age 30   Vaping Use   • Vaping Use: Never used   Substance Use Topics   • Alcohol use: Yes     Comment: Rare   • Drug use: Yes     Types: Marijuana     Comment: Daily         Review of Systems:  Review of Systems   Musculoskeletal: Positive for arthralgias and joint swelling.   Skin: Negative for wound.   All other systems reviewed and are negative.       Physical Exam:  /60 (BP Location: Left arm, Patient Position: Sitting)   Pulse 96   Temp 98.2 °F (36.8 °C) (Oral)   Resp 18   Ht 188 cm (74\")   SpO2 99%   BMI 45.58 kg/m²     Physical Exam  Vitals and nursing note reviewed.   Constitutional:       General: He is not in acute distress.     Appearance: Normal appearance. He is obese. He is not ill-appearing, toxic-appearing or diaphoretic.   HENT:      Head: Normocephalic and atraumatic.      Nose: Nose normal.   Eyes:      Extraocular Movements: Extraocular movements intact.      Conjunctiva/sclera: Conjunctivae normal.      Pupils: Pupils are equal, round, and reactive to light.   Pulmonary:      Effort: Pulmonary effort is normal. No respiratory distress.   Musculoskeletal:         General: Normal range of motion.      Cervical back: Normal range of motion and neck supple.      Comments: There is normal range of motion of the left hip left knee.  There is mild tenderness palpation noted to the lateral left hip.  There is no obvious laceration, swelling, ecchymosis, or deformity noted of the left knee.  There is no tenderness to palpation of the left knee.  Left leg is neurovascularly intact.  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   Skin:     General: Skin is warm and dry.   Neurological:      General: No focal deficit present.      Mental Status: He is alert and oriented to person, place, and time.   Psychiatric:         Mood and Affect: Mood normal.         Behavior: Behavior normal.         Thought Content: Thought content normal.        "  Judgment: Judgment normal.                Medications in the Emergency Department:  Medications   oxyCODONE-acetaminophen (PERCOCET) 5-325 MG per tablet 1 tablet (1 tablet Oral Given 12/20/22 1932)        Labs  Lab Results (last 24 hours)     ** No results found for the last 24 hours. **           Imaging:  XR Knee 3 View Left    Result Date: 12/20/2022  PROCEDURE: XR KNEE 3 VW LEFT  COMPARISON: [facility], CR, BILAT KNEES AP STANDING ONLY, 6/17/2016, 9:11.  INDICATIONS: generalized left knee pain after fall 2 weeks ago  FINDINGS:   Moderate tricompartment degenerative changes are present in the knees.  No fractures, dislocations or acute osseous abnormalities are identified.  IMPRESSION: Moderate degenerative change.  No acute osseous abnormalities are identified.   FILIBERTO OWUSU MD       Electronically Signed and Approved By: FILIBERTO OWUSU MD on 12/20/2022 at 19:40             XR Hip With or Without Pelvis 2 - 3 View Left    Result Date: 12/20/2022  PROCEDURE: XR HIP W OR WO PELVIS 2-3 VIEW LEFT  COMPARISON: [facility], CT, CT ANGIO ABDOMINAL AORTA BILAT ILIOFEM RUNOFF, 10/03/2022, 21:32.  [facility], MR, MRI FOOT RIGHT W WO CONTRAST, 10/02/2022, 12:24.  INDICATIONS: generalized left hip pain after fall 2 weeks ago  FINDINGS:   There are moderate degenerative changes in the hips left greater than right.  No evidence of acute fracture or dislocation.  There are no lytic or sclerotic lesions.  IMPRESSION: Moderate degenerative changes in the hips left greater than right.  No acute osseous abnormalities are identified.   FILIBERTO OWUSU MD       Electronically Signed and Approved By: FILIBERTO OWUSU MD on 12/20/2022 at 19:37               Procedures:  Procedures    Progress                            Medical Decision Making:  MDM  Number of Diagnoses or Management Options  Acute pain of left knee  Fall, initial encounter  Left hip pain  Diagnosis management  comments: I have spoken with patient. I have explained the patient´s condition, diagnoses and treatment plan based on the information available to me at this time. I have answered the patient's questions and addressed any concerns. The patient has a good  understanding of the patient´s diagnosis, condition, and treatment plan as can be expected at this point. The vital signs have been stable. The patient´s condition is stable and appropriate for discharge from the emergency department.      The patient will pursue further outpatient evaluation with the primary care physician or other designated or consulting physician as outlined in the discharge instructions. They are agreeable to this plan of care and follow-up instructions have been explained in detail. The patient has received these instructions in written format and have expressed an understanding of the discharge instructions. The patient is aware that any significant change in condition or worsening of symptoms should prompt an immediate return to this or the closest emergency department or call to 911.       Amount and/or Complexity of Data Reviewed  Tests in the radiology section of CPT®: reviewed and ordered  Independent visualization of images, tracings, or specimens: yes    Risk of Complications, Morbidity, and/or Mortality  Presenting problems: moderate  Diagnostic procedures: low  Management options: low    Patient Progress  Patient progress: stable       Final diagnoses:   Fall, initial encounter   Acute pain of left knee   Left hip pain        Disposition:  ED Disposition     ED Disposition   Discharge    Condition   Stable    Comment   --             This medical record created using voice recognition software.           Travis Rao PA-C  12/20/22 2040

## 2022-12-22 ENCOUNTER — OFFICE VISIT (OUTPATIENT)
Dept: DIABETES SERVICES | Facility: HOSPITAL | Age: 62
End: 2022-12-22

## 2022-12-22 VITALS
BODY MASS INDEX: 40.43 KG/M2 | HEART RATE: 93 BPM | SYSTOLIC BLOOD PRESSURE: 138 MMHG | WEIGHT: 315 LBS | HEIGHT: 74 IN | OXYGEN SATURATION: 98 % | TEMPERATURE: 98.1 F | DIASTOLIC BLOOD PRESSURE: 70 MMHG

## 2022-12-22 DIAGNOSIS — E66.01 SEVERE OBESITY (BMI >= 40): ICD-10-CM

## 2022-12-22 DIAGNOSIS — L97.424 DIABETIC ULCER OF LEFT HEEL ASSOCIATED WITH TYPE 2 DIABETES MELLITUS, WITH NECROSIS OF BONE: ICD-10-CM

## 2022-12-22 DIAGNOSIS — Z79.4 TYPE 2 DIABETES MELLITUS WITH DIABETIC NEUROPATHY, WITH LONG-TERM CURRENT USE OF INSULIN: ICD-10-CM

## 2022-12-22 DIAGNOSIS — E11.621 DIABETIC ULCER OF RIGHT MIDFOOT ASSOCIATED WITH TYPE 2 DIABETES MELLITUS, WITH MUSCLE INVOLVEMENT WITHOUT EVIDENCE OF NECROSIS: ICD-10-CM

## 2022-12-22 DIAGNOSIS — E11.65 UNCONTROLLED TYPE 2 DIABETES MELLITUS WITH HYPERGLYCEMIA: Primary | ICD-10-CM

## 2022-12-22 DIAGNOSIS — E11.621 DIABETIC ULCER OF LEFT HEEL ASSOCIATED WITH TYPE 2 DIABETES MELLITUS, WITH NECROSIS OF BONE: ICD-10-CM

## 2022-12-22 DIAGNOSIS — E11.40 TYPE 2 DIABETES MELLITUS WITH DIABETIC NEUROPATHY, WITH LONG-TERM CURRENT USE OF INSULIN: ICD-10-CM

## 2022-12-22 DIAGNOSIS — L97.415 DIABETIC ULCER OF RIGHT MIDFOOT ASSOCIATED WITH TYPE 2 DIABETES MELLITUS, WITH MUSCLE INVOLVEMENT WITHOUT EVIDENCE OF NECROSIS: ICD-10-CM

## 2022-12-22 PROBLEM — F32.A DEPRESSION, UNSPECIFIED: Status: ACTIVE | Noted: 2022-05-02

## 2022-12-22 PROBLEM — D69.6 THROMBOCYTOPENIA, UNSPECIFIED: Status: ACTIVE | Noted: 2022-10-07

## 2022-12-22 PROBLEM — F41.9 ANXIETY DISORDER, UNSPECIFIED: Status: ACTIVE | Noted: 2022-10-07

## 2022-12-22 PROBLEM — F40.240 CLAUSTROPHOBIA: Status: ACTIVE | Noted: 2022-05-02

## 2022-12-22 PROBLEM — M86.671 OTHER CHRONIC OSTEOMYELITIS, RIGHT ANKLE AND FOOT (HCC): Status: ACTIVE | Noted: 2022-10-07

## 2022-12-22 PROBLEM — Z79.84 LONG TERM (CURRENT) USE OF ORAL HYPOGLYCEMIC DRUGS: Status: ACTIVE | Noted: 2022-05-02

## 2022-12-22 PROBLEM — S91.309A WOUND OF FOOT: Status: ACTIVE | Noted: 2022-05-02

## 2022-12-22 PROBLEM — Z99.3 DEPENDENCE ON WHEELCHAIR: Status: ACTIVE | Noted: 2022-10-27

## 2022-12-22 PROBLEM — L97.511: Status: ACTIVE | Noted: 2022-05-02

## 2022-12-22 PROBLEM — I95.1 ORTHOSTATIC HYPOTENSION: Status: ACTIVE | Noted: 2022-10-07

## 2022-12-22 PROBLEM — Z79.01 LONG TERM (CURRENT) USE OF ANTICOAGULANTS: Status: ACTIVE | Noted: 2022-05-02

## 2022-12-22 PROBLEM — Z87.891 PERSONAL HISTORY OF NICOTINE DEPENDENCE: Status: ACTIVE | Noted: 2022-05-02

## 2022-12-22 LAB
EXPIRATION DATE: ABNORMAL
GLUCOSE BLDC GLUCOMTR-MCNC: 194 MG/DL (ref 70–99)
HBA1C MFR BLD: 7.6 %
Lab: ABNORMAL

## 2022-12-22 PROCEDURE — 82962 GLUCOSE BLOOD TEST: CPT | Performed by: NURSE PRACTITIONER

## 2022-12-22 PROCEDURE — 99214 OFFICE O/P EST MOD 30 MIN: CPT | Performed by: NURSE PRACTITIONER

## 2022-12-22 PROCEDURE — G0463 HOSPITAL OUTPT CLINIC VISIT: HCPCS | Performed by: NURSE PRACTITIONER

## 2022-12-22 RX ORDER — INSULIN DETEMIR 100 [IU]/ML
60 INJECTION, SOLUTION SUBCUTANEOUS DAILY
Qty: 60 ML | Refills: 1 | Status: SHIPPED | OUTPATIENT
Start: 2022-12-22 | End: 2023-01-27 | Stop reason: SDUPTHER

## 2022-12-22 RX ORDER — INSULIN LISPRO 100 [IU]/ML
INJECTION, SOLUTION INTRAVENOUS; SUBCUTANEOUS
COMMUNITY
Start: 2022-11-29 | End: 2022-12-22

## 2022-12-22 RX ORDER — DULAGLUTIDE 3 MG/.5ML
3 INJECTION, SOLUTION SUBCUTANEOUS
Qty: 6 ML | Refills: 1 | Status: SHIPPED | OUTPATIENT
Start: 2022-12-22 | End: 2023-01-27

## 2022-12-22 RX ORDER — INSULIN DETEMIR 100 [IU]/ML
50 INJECTION, SOLUTION SUBCUTANEOUS DAILY
Qty: 45 ML | Refills: 1 | Status: SHIPPED | OUTPATIENT
Start: 2022-12-22 | End: 2022-12-22 | Stop reason: SDUPTHER

## 2022-12-22 RX ORDER — BLOOD SUGAR DIAGNOSTIC
1 STRIP MISCELLANEOUS 4 TIMES DAILY
Qty: 400 EACH | Refills: 1 | Status: SHIPPED | OUTPATIENT
Start: 2022-12-22

## 2022-12-22 RX ORDER — INSULIN LISPRO 200 [IU]/ML
30 INJECTION, SOLUTION SUBCUTANEOUS
Qty: 45 ML | Refills: 1 | Status: SHIPPED | OUTPATIENT
Start: 2022-12-22

## 2022-12-22 NOTE — PATIENT INSTRUCTIONS
Increase the Levemir to 60 units once a day    Increase the Trulicity to 3 mg once weekly    Before each meal take Novolog insulin as follows:    10 units for “small meal” (30 - 45 grams of carbohydrate)        (may take ½ dose if eating less than 30 grams)  15 units for “medium meal” (45 - 60 grams of carbohydrate)  20 units for “large meal” (60 - 90 grams of carbohydrate)    Add to the meal dose as follows:    If blood glucose is less than 150 mg/dl - 0 units  If blood glucose is between 151 and 175 - 2 units  If blood glucose is between 176 and 200 - 3 units  If blood glucose is between 201 and 225 - 4 units  If blood glucose is between 226 and 250 - 5 units  If blood glucose is between 251 and 275 - 6 units  If blood glucose is between 276 and 300 - 7 units  If blood glucose is between 301 and 325 - 8 units  If blood glucose is between 326 and 350 - 9 units  If blood glucose is 351 or above - 10 units

## 2022-12-22 NOTE — PROGRESS NOTES
Chief Complaint  Diabetes (Follow up, med mgt, a1c eval)    Referred By: VALENTÍN Hoffman presents to [facility] for diabetes medication management    History of Present Illness    Visit type:  follow-up  Diabetes type:  Type 2  Current diabetes status/concerns/issues: He was last seen in our office in October 2021.  He had difficulty arranging transportation to see us and had to cancel appointments plus he has had some hospitalizations as well  Other health concerns: He was hospitalized on 10/5/2022 with osteomyelitis and cellulitis in the right foot.  He was seen in the emergency room last night because of a recent fall that has caused subsequent hip and knee pain on the left side.  They attributed to significant arthritis.  Diabetes symptoms:    Polyuria: No   Polydipsia: Yes   Polyphagia: No   Blurred vision: Yes   Excessive fatigue: No   Diabetes complications:  Neuro: Neuropathy in the extremities  Renal: None  Eyes: None  Amputation/Wounds:He has a ulcer on the left heel; he has undergone amputation of the toes of the right foot with an open wound.  He is being managed by the wound clinic  GI: None  Cardiovascular: hypertension  ED: N/A   Other: None  Hypoglycemia:  None reported at this time  Hypoglycemia Symptoms:  No hypoglycemia at this time  Current diabetes treatment:   Levemir 50 units in the evening with NovoLog taking 10, 15, or 20 units based on small, medium, or large sized meals plus correction for glucose levels greater than 150 mg/dL starting with 2 units and increasing by 1 unit for every 25 mg/dL thereafter.  Trulicity 1.5 mg was added at his last appointment in October 2021.  He is also taking metformin 1000 mg twice a day  Blood glucose device:  Meter  Blood glucose monitoring frequency:  2 -3  Blood glucose range/average:  200  Diet:  Limits high carb/sweet foods, Avoids sugary drinks  Activity/Exercise:  None    Past  Medical History:   Diagnosis Date   • Absence of toe of right foot    • Acute osteomyelitis of left calcaneus  2021   • Anxiety and depression    • Arthritis    • Claustrophobia    • Corns and callus    • Diabetic ulcer of left heel associated with type 2 DM 2021   • Diabetic ulcer of left heel associated with type 2 DM 2021   • Diabetic ulcer of right midfoot associated with type 2 DM 2021   • Difficulty walking    • Essential hypertension 2021   • Hammertoe    • Hyperlipidemia LDL goal <100 2021   • Ingrown toenail    • Obesity    • Paroxysmal atrial fibrillation 2021   • Polyneuropathy    • Pressure ulcer, stage 1    • Tinea unguium    • Type 2 diabetes mellitus with polyneuropathy      Past Surgical History:   Procedure Laterality Date   • CYST REMOVAL      center of back; benign   • INCISION AND DRAINAGE ABSCESS      back   • INCISION AND DRAINAGE LEG Right 12/10/2021    Procedure: INCISION AND DRAINAGE LOWER EXTREMITY;  Surgeon: Ash Leyva DPM;  Location: [facility];  Service: Podiatry;  Laterality: Right;   • OTHER SURGICAL HISTORY      Surgical clips left foot   • TOE SURGERY Right     Removal of 5th toe   • TRANS METATARSAL AMPUTATION Right 2021    Procedure: AMPUTATION TRANS METATARSAL;  Surgeon: Ash Leyva DPM;  Location: [facility];  Service: Podiatry;  Laterality: Right;   • WRIST SURGERY Left     repair of injury     Family History   Problem Relation Age of Onset   • Heart disease Mother    • Heart disease Father    • Cancer Father         Unspecified   • Heart disease Brother      Social History     Socioeconomic History   • Marital status: Single   Tobacco Use   • Smoking status: Former     Packs/day: 0.00     Years: 1.00     Pack years: 0.00     Types: Cigarettes     Quit date: 1991     Years since quittin.3   • Smokeless tobacco: Never   • Tobacco comments:     quit at age 30   Vaping Use   • Vaping Use: Never used    Substance and Sexual Activity   • Alcohol use: Yes     Comment: Rare   • Drug use: Yes     Types: Marijuana     Comment: Daily   • Sexual activity: Defer     Allergies   Allergen Reactions   • Adhesive Tape Rash       Current Outpatient Medications:   •  alfuzosin (UROXATRAL) 10 MG 24 hr tablet, Take 10 mg by mouth Daily., Disp: , Rfl:   •  amoxicillin-clavulanate (AUGMENTIN) 875-125 MG per tablet, Take 1 tablet by mouth 2 (Two) Times a Day., Disp: , Rfl:   •  apixaban (ELIQUIS) 5 MG tablet tablet, Take 5 mg by mouth 2 (two) times a day., Disp: , Rfl:   •  cefpodoxime (VANTIN) 200 MG tablet, Take 2 tablets by mouth Every 12 (Twelve) Hours for 7 days., Disp: 28 tablet, Rfl: 0  •  Diclofenac Sodium (VOLTAREN) 1 % gel gel, Apply 4 g topically to the appropriate area as directed 4 (Four) Times a Day As Needed (hip pain)., Disp: 100 g, Rfl: 2  •  doxycycline (VIBRAMYCIN) 100 MG capsule, Take 1 capsule by mouth 2 (Two) Times a Day., Disp: , Rfl:   •  glucose blood test strip, Test blood sugar 3 times a day, Disp: 100 each, Rfl: 5  •  insulin detemir (Levemir FlexTouch) 100 UNIT/ML injection, Inject 60 Units under the skin into the appropriate area as directed Daily., Disp: 60 mL, Rfl: 1  •  lisinopril (PRINIVIL,ZESTRIL) 20 MG tablet, Take 20 mg by mouth Daily., Disp: , Rfl:   •  meloxicam (MOBIC) 7.5 MG tablet, Take 1 tablet by mouth Daily., Disp: 10 tablet, Rfl: 0  •  metFORMIN (GLUCOPHAGE) 1000 MG tablet, Take 1,000 mg by mouth 2 (two) times a day., Disp: , Rfl:   •  metoprolol succinate XL (TOPROL-XL) 50 MG 24 hr tablet, Take 50 mg by mouth Daily., Disp: , Rfl:   •  Pro Comfort Lancets 31G misc, , Disp: , Rfl:   •  simvastatin (ZOCOR) 20 MG tablet, Take 20 mg by mouth Every Night., Disp: , Rfl:   •  sodium hypochlorite (DAKIN'S 1/4 STRENGTH) 0.125 % solution topical solution 0.125%, Apply 10 mL topically to the appropriate area as directed 2 (Two) Times a Day., Disp: 1000 mL, Rfl: 1  •  Dulaglutide (Trulicity) 3  "MG/0.5ML solution pen-injector, Inject 0.5 mL under the skin into the appropriate area as directed Every 7 (Seven) Days for 4 doses., Disp: 6 mL, Rfl: 1  •  insulin aspart (NovoLOG) 100 UNIT/ML injection, Inject 20 Units under the skin into the appropriate area as directed 3 (Three) Times a Day Before Meals. Take up to 20 units as instructed, Disp: 20 mL, Rfl: 3  •  Insulin Lispro (HumaLOG KwikPen) 200 UNIT/ML solution pen-injector, Inject 30 Units under the skin into the appropriate area as directed 3 (Three) Times a Day Before Meals., Disp: 45 mL, Rfl: 1  •  Insulin Pen Needle (Pen Needles) 32G X 5 MM misc, 1 each 4 (Four) Times a Day., Disp: 400 each, Rfl: 1    Review of Systems   Constitutional: Positive for fatigue and unexpected weight gain (20 pounds). Negative for activity change, appetite change and unexpected weight loss.   Eyes: Positive for blurred vision. Negative for visual disturbance.   Gastrointestinal: Positive for constipation. Negative for abdominal pain, diarrhea, nausea, vomiting, GERD and indigestion.   Endocrine: Positive for polydipsia. Negative for polyphagia and polyuria.   Skin: Positive for wound (feet).   Neurological: Positive for numbness.        Objective     Vitals:    12/22/22 1501   BP: 138/70   BP Location: Left arm   Patient Position: Sitting   Cuff Size: Adult   Pulse: 93   Temp: 98.1 °F (36.7 °C)   SpO2: 98%   Weight: (!) 171 kg (376 lb)   Height: 188 cm (74\")   PainSc:   4     Body mass index is 48.28 kg/m².    Physical Exam  Constitutional:       Appearance: Normal appearance. He is obese.      Comments: Severe obesity with BMI of 48.2-   HENT:      Head: Normocephalic and atraumatic.      Right Ear: External ear normal.      Left Ear: External ear normal.      Nose: Nose normal.   Eyes:      Extraocular Movements: Extraocular movements intact.      Conjunctiva/sclera: Conjunctivae normal.   Pulmonary:      Effort: Pulmonary effort is normal.   Musculoskeletal:         " General: Normal range of motion.      Cervical back: Normal range of motion.      Comments: He is using a rolling walker for assistance with ambulation   Skin:     General: Skin is warm and dry.   Neurological:      General: No focal deficit present.      Mental Status: He is alert and oriented to person, place, and time. Mental status is at baseline.   Psychiatric:         Mood and Affect: Mood normal.         Behavior: Behavior normal.         Thought Content: Thought content normal.         Judgment: Judgment normal.         Result Review :   The following data was reviewed by: VALENTÍN Covington on 12/22/2022:    Most Recent A1C    HGBA1C Most Recent 12/22/22   Hemoglobin A1C 7.6 (A)   (A) Abnormal value              A1C Last 3 Results    HGBA1C Last 3 Results 10/1/22 12/22/22   Hemoglobin A1C 7.30 (A) 7.6 (A)   (A) Abnormal value            Point-of-care A1c in the office today was 7.6% indicating uncontrolled type 2 diabetes.  This is up from the prior result of 7.3 collected in October of this year.    Glucose   Date Value Ref Range Status   12/22/2022 194 (H) 70 - 99 mg/dL Final     Comment:     Serial Number: [id number]:  [id number]     Point-of-care glucose level in the office today is elevated at 194 mg/dL    Creatinine   Date Value Ref Range Status   11/08/2022 0.62 (L) 0.76 - 1.27 mg/dL Final   10/05/2022 0.71 (L) 0.76 - 1.27 mg/dL Final     eGFR   Date Value Ref Range Status   11/08/2022 106.7 >60.0 mL/min/1.73 Final     Comment:     National Kidney Foundation and American Society of Nephrology (ASN) Task Force recommended calculation based on the Chronic Kidney Disease Epidemiology Collaboration (CKD-EPI) equation refit without adjustment for race.   10/05/2022 103.1 >60.0 mL/min/1.73 Final     Comment:     National Kidney Foundation and American Society of Nephrology (ASN) Task Force recommended calculation based on the Chronic Kidney Disease Epidemiology Collaboration (CKD-EPI) equation  refit without adjustment for race.     Labs collected 11/8/2022 show normal renal function      Total Cholesterol   Date Value Ref Range Status   11/08/2022 162 0 - 200 mg/dL Final     Triglycerides   Date Value Ref Range Status   11/08/2022 60 0 - 150 mg/dL Final     HDL Cholesterol   Date Value Ref Range Status   11/08/2022 61 (H) 40 - 60 mg/dL Final     LDL Cholesterol    Date Value Ref Range Status   11/08/2022 89 0 - 100 mg/dL Final     Lipid panel collected 11/8/2022 shows normal lipid panel       {CC Problem List  Visit Diagnosis  ROS  Review (Popup)  Quill Content Maintenance  Quality  BestPractice  Medications  SmartSets  SnapShot Encounters  Media :23}     Assessment: Patient's had a slight increase in his A1c since last being seen 1 year ago.  He has had significant health issues including amputation of the toes on the right foot.  He continues to struggle with wounds on his left heel as well as on the surgical site of the right foot.  He has had a 20 pound weight gain since last being seen in this office.  He is interested in a continuous glucose sensor to help him monitor his glucose levels more carefully      Diagnoses and all orders for this visit:    1. Uncontrolled type 2 diabetes mellitus with hyperglycemia (HCC) (Primary)  -     POC Glycosylated Hemoglobin (Hb A1C)  -     Insulin Lispro (HumaLOG KwikPen) 200 UNIT/ML solution pen-injector; Inject 30 Units under the skin into the appropriate area as directed 3 (Three) Times a Day Before Meals.  Dispense: 45 mL; Refill: 1  -     Discontinue: insulin detemir (Levemir FlexTouch) 100 UNIT/ML injection; Inject 50 Units under the skin into the appropriate area as directed Daily.  Dispense: 45 mL; Refill: 1  -     insulin detemir (Levemir FlexTouch) 100 UNIT/ML injection; Inject 60 Units under the skin into the appropriate area as directed Daily.  Dispense: 60 mL; Refill: 1    2. Type 2 diabetes mellitus with diabetic neuropathy, with long-term  current use of insulin ([facility])    3. Diabetic ulcer of left heel associated with type 2 DM    4. Diabetic ulcer of right midfoot associated with type 2 DM    5. Severe obesity (BMI >= 40) ([facility])    Other orders  -     POC Glucose  -     Insulin Pen Needle (Pen Needles) 32G X 5 MM misc; 1 each 4 (Four) Times a Day.  Dispense: 400 each; Refill: 1  -     Dulaglutide (Trulicity) 3 MG/0.5ML solution pen-injector; Inject 0.5 mL under the skin into the appropriate area as directed Every 7 (Seven) Days for 4 doses.  Dispense: 6 mL; Refill: 1        Plan: Patient instructed to increase his Levemir to 60 units once a day.  He was reinstructed on the appropriate use of the mealtime insulin and correction scale.  A written copy was provided and reviewed with the patient.  We will also increase his Trulicity to 3 mg once weekly.  If he has any problems with glucose levels he is to contact our office.  He will be scheduled for routine follow-up appointment.  We we will request a bella continuous glucose sensor to help facilitate closer monitoring of glucose levels.    The patient will monitor his blood glucose levels to 3 times a day or using the continuous glucose sensor if approved.  If he develops problematic hyperglycemia or hypoglycemia or adverse drug reactions, he will contact the office for further instructions.        Follow Up     Return in about 3 months (around 3/22/2023) for Medication Management.    Patient was given instructions and counseling regarding his condition or for health maintenance advice. Please see specific information pulled into the AVS if appropriate.     VALENTÍN Covington  12/22/2022      Dictated Utilizing Dragon Dictation.  Please note that portions of this note were completed with a voice recognition program.  Part of this note may be an electronic transcription/translation of spoken language to printed text using the Dragon Dictation System.

## 2022-12-27 ENCOUNTER — TELEPHONE (OUTPATIENT)
Dept: DIABETES SERVICES | Facility: HOSPITAL | Age: 62
End: 2022-12-27

## 2022-12-27 NOTE — TELEPHONE ENCOUNTER
Called patient back to follow up. Recommended that he make an appointment with provider to discuss his hip pain. He states that he has gotten some relief finally, and will discuss with provider at appointment next week.

## 2022-12-30 ENCOUNTER — OFFICE VISIT (OUTPATIENT)
Dept: WOUND CARE | Facility: HOSPITAL | Age: 62
End: 2022-12-30

## 2022-12-30 VITALS
SYSTOLIC BLOOD PRESSURE: 138 MMHG | DIASTOLIC BLOOD PRESSURE: 64 MMHG | RESPIRATION RATE: 16 BRPM | TEMPERATURE: 97.2 F | HEART RATE: 90 BPM

## 2022-12-30 DIAGNOSIS — E11.621 DIABETIC ULCER OF LEFT HEEL ASSOCIATED WITH TYPE 2 DIABETES MELLITUS, LIMITED TO BREAKDOWN OF SKIN: ICD-10-CM

## 2022-12-30 DIAGNOSIS — T81.31XD POSTOPERATIVE WOUND DEHISCENCE, SUBSEQUENT ENCOUNTER: Primary | ICD-10-CM

## 2022-12-30 DIAGNOSIS — L97.211 VENOUS STASIS ULCER OF RIGHT CALF LIMITED TO BREAKDOWN OF SKIN WITHOUT VARICOSE VEINS: ICD-10-CM

## 2022-12-30 DIAGNOSIS — L97.421 DIABETIC ULCER OF LEFT HEEL ASSOCIATED WITH TYPE 2 DIABETES MELLITUS, LIMITED TO BREAKDOWN OF SKIN: ICD-10-CM

## 2022-12-30 DIAGNOSIS — I87.2 VENOUS STASIS ULCER OF RIGHT CALF LIMITED TO BREAKDOWN OF SKIN WITHOUT VARICOSE VEINS: ICD-10-CM

## 2022-12-30 PROCEDURE — 87186 SC STD MICRODIL/AGAR DIL: CPT | Performed by: SURGERY

## 2022-12-30 PROCEDURE — 87070 CULTURE OTHR SPECIMN AEROBIC: CPT | Performed by: SURGERY

## 2022-12-30 PROCEDURE — 97597 DBRDMT OPN WND 1ST 20 CM/<: CPT | Performed by: SURGERY

## 2022-12-30 PROCEDURE — 87205 SMEAR GRAM STAIN: CPT | Performed by: SURGERY

## 2022-12-30 PROCEDURE — 99213 OFFICE O/P EST LOW 20 MIN: CPT | Performed by: SURGERY

## 2022-12-30 NOTE — PROGRESS NOTES
Chief Complaint  Wound Check (Wound check to bilateral feet )    Subjective            Objective     Vitals:    12/30/22 0932   BP: 138/64   BP Location: Left arm   Patient Position: Sitting   Pulse: 90   Resp: 16   Temp: 97.2 °F (36.2 °C)   TempSrc: Temporal   PainSc: 0-No pain         I have reviewed the HPI and ROS as documented by MA/RN. Narayan Dumont MD    Physical Exam     Wound Description:  The open wound on the distal right foot where patient's had a transmetatarsal amputation continues to closing in..  The callus around the wound site was excised today.  Continue to apply Betadine to the wound site and then apply a Unna boot to that area lower extremity and leave it for 1 week.  The patient now has what appears to be stasis ulceration of the medial aspect of his calf on the right lower extremity.  We will cover that with Hydrofera Blue I when the Unna boot is applied to the extremity.  The ulcer on the heel of the left foot appears to be more superficial.  The thick callus around the wound site was excised today.  We will continue to have him apply Padmini to that wound site.  The ulceration on the medial calf of the right lower extremity was cultured today.  Patient's been asked to see me again in 1 week.    Result Review :   The following data was reviewed by: Narayan Dumont MD on 12/30/2022:      Wound debridement  Performed by: Narayan Dumont MD  Authorized by: Narayan Dumont MD     Correct patient: Identification verified by two methods:     Verbally and Date of birth  Correct procedure/consent    How many wounds are you performing a debridement on?:  2  Wound 1:     Nursing Documentation:   Measurements:     The total amount debrided on this wound was under 20 cm2.     Provider Documentation    Debridement type:  Sharp/excisional    Other:  Alcohol    Other:  10.  Scalpel     None    Tissue debrided:  Moderate    Type tissue:  Slough    Level removed:  Skin    Patient tolerance:  Good     Hemostasis achieved by:  Silver nitrate    Description:  The total surface area of the wound debrided on the distal right foot was less than 20 cm².  Wound 2:       Provider Documentation:     Debridement type:  Sharp/Excisional    Other:  Alcohol    Other:  10.  Scalpel     None    Tissue debrided:  Large    Type tissue:  Slough    Level removed:  Skin    Patient tolerance:  Good    Hemostasis achieved by:  Silver Nitrate    Wound Bed Post Debridement: See Photo      Description:  The total surface area of the wound debrided on the heel of the left foot was less than 20 cm².            Assessment and Plan    Diagnoses and all orders for this visit:    1. Venous stasis ulcer of right calf limited to breakdown of skin without varicose veins (HCC) (Primary)  -     Wound Culture - Wound, Leg, Right    2. Diabetic ulcer of left heel associated with type 2 diabetes mellitus, limited to breakdown of skin (HCC)    3. Postoperative wound dehiscence, subsequent encounter    Other orders  -     Wound debridement            Follow Up   Return in about 1 week (around 1/6/2023).  Patient was given instructions and counseling regarding his condition or for health maintenance advice. Please see specific information pulled into the AVS if appropriate.

## 2023-01-02 LAB
BACTERIA SPEC AEROBE CULT: ABNORMAL
BACTERIA SPEC AEROBE CULT: ABNORMAL
GRAM STN SPEC: ABNORMAL
GRAM STN SPEC: ABNORMAL

## 2023-01-03 DIAGNOSIS — T14.8XXA WOUND INFECTION: Primary | ICD-10-CM

## 2023-01-03 DIAGNOSIS — L08.9 WOUND INFECTION: Primary | ICD-10-CM

## 2023-01-03 RX ORDER — LEVOFLOXACIN 750 MG/1
750 TABLET ORAL DAILY
Qty: 7 TABLET | Refills: 0 | Status: SHIPPED | OUTPATIENT
Start: 2023-01-03 | End: 2023-01-10

## 2023-01-06 ENCOUNTER — OFFICE VISIT (OUTPATIENT)
Dept: WOUND CARE | Facility: HOSPITAL | Age: 63
End: 2023-01-06
Payer: MEDICARE

## 2023-01-06 VITALS
TEMPERATURE: 97.1 F | DIASTOLIC BLOOD PRESSURE: 70 MMHG | SYSTOLIC BLOOD PRESSURE: 120 MMHG | RESPIRATION RATE: 18 BRPM | HEART RATE: 83 BPM

## 2023-01-06 DIAGNOSIS — I87.2 VENOUS STASIS ULCER OF RIGHT CALF LIMITED TO BREAKDOWN OF SKIN WITHOUT VARICOSE VEINS: ICD-10-CM

## 2023-01-06 DIAGNOSIS — T81.31XD POSTOPERATIVE WOUND DEHISCENCE, SUBSEQUENT ENCOUNTER: ICD-10-CM

## 2023-01-06 DIAGNOSIS — L97.211 VENOUS STASIS ULCER OF RIGHT CALF LIMITED TO BREAKDOWN OF SKIN WITHOUT VARICOSE VEINS: ICD-10-CM

## 2023-01-06 DIAGNOSIS — L97.421 DIABETIC ULCER OF LEFT HEEL ASSOCIATED WITH TYPE 2 DIABETES MELLITUS, LIMITED TO BREAKDOWN OF SKIN: Primary | ICD-10-CM

## 2023-01-06 DIAGNOSIS — E11.621 DIABETIC ULCER OF LEFT HEEL ASSOCIATED WITH TYPE 2 DIABETES MELLITUS, LIMITED TO BREAKDOWN OF SKIN: Primary | ICD-10-CM

## 2023-01-06 PROCEDURE — 97597 DBRDMT OPN WND 1ST 20 CM/<: CPT | Performed by: SURGERY

## 2023-01-06 NOTE — PROGRESS NOTES
Chief Complaint  Wound Check (WOUND CHECK TO RIGHT FOOT ( ))    Subjective            Objective     Vitals:    01/06/23 1510   BP: 120/70   BP Location: Left arm   Patient Position: Sitting   Pulse: 83   Resp: 18   Temp: 97.1 °F (36.2 °C)   TempSrc: Temporal   PainSc: 0-No pain         I have reviewed the HPI and ROS as documented by MA/RN. Narayan Dumont MD    Physical Exam     Wound Description:  The amputation wound on the distal aspect of the right foot where patient had had a metatarsal amputation continues to improve.  I did excised the callus around the wound site.  The area of superficial ulceration on the distal medial aspect of the right leg is also improved and the Hydrofera Blue appears to have helped that considerably.  We will continue to use Hydrofera Blue on the distal medial aspect of the right leg and continue to apply Betadine to the distal foot wound.  Will wrap the leg with an Unna boot.  And patient will be seen here again in 1 week for that to be changed.  The wound on the heel of the left foot shows minimal change.  I did excise of the thick callus around that wound site and will continue to apply Padmini to that wound.    Result Review :   The following data was reviewed by: Narayan Dumont MD on 01/06/2023:      Wound debridement  Performed by: Narayan Dumont MD  Authorized by: Narayan Dumont MD     Correct patient: Identification verified by two methods:     Verbally  Correct procedure/consent    How many wounds are you performing a debridement on?:  2  Wound 1:     Nursing Documentation:   Measurements:     The total amount debrided on this wound was under 20 cm2.     Provider Documentation    Debridement type:  Sharp/excisional    Other:  Alcohol    Other:  10.  Scalp     None    Tissue debrided:  Large    Type tissue:  Slough    Level removed:  Skin    Patient tolerance:  Good    Hemostasis achieved by:  Silver nitrate    Wound Bed Post Debridement: See Photo       Description:  The total surface area of the wound debrided on the distal aspect of the right foot was less than 20 cm².  Wound 2:       Provider Documentation:     Debridement type:  Sharp/Excisional    Other:  Alcohol    Other:  10.  Scalpel     None    Tissue debrided:  Moderate    Type tissue:  Slough    Level removed:  Skin    Patient tolerance:  Good    Hemostasis achieved by:  Silver Nitrate    Description:  Total surface area of the wound debrided on the heel of the left foot was less than 20 cm².            Assessment and Plan    Diagnoses and all orders for this visit:    1. Diabetic ulcer of left heel associated with type 2 diabetes mellitus, limited to breakdown of skin (HCC) (Primary)  -     Bandage UNNA Boot With / ZINC With / O CALAMINE LF 4IN 10YD  -     Wound debridement    2. Postoperative wound dehiscence, subsequent encounter  -     Bandage UNNA Boot With / ZINC With / O CALAMINE LF 4IN 10YD  -     Wound debridement    3. Venous stasis ulcer of right calf limited to breakdown of skin without varicose veins (HCC)  -     Bandage UNNA Boot With / ZINC With / O CALAMINE LF 4IN 10YD  -     Wound debridement            Follow Up   Return in about 1 week (around 1/13/2023).  Patient was given instructions and counseling regarding his condition or for health maintenance advice. Please see specific information pulled into the AVS if appropriate.

## 2023-01-11 ENCOUNTER — TELEPHONE (OUTPATIENT)
Dept: INTERNAL MEDICINE | Facility: CLINIC | Age: 63
End: 2023-01-11
Payer: MEDICARE

## 2023-01-11 DIAGNOSIS — M79.2 NEUROPATHIC PAIN: ICD-10-CM

## 2023-01-11 DIAGNOSIS — E66.01 CLASS 3 SEVERE OBESITY DUE TO EXCESS CALORIES WITH SERIOUS COMORBIDITY AND BODY MASS INDEX (BMI) OF 45.0 TO 49.9 IN ADULT: Primary | ICD-10-CM

## 2023-01-11 DIAGNOSIS — M25.562 CHRONIC PAIN OF BOTH KNEES: ICD-10-CM

## 2023-01-11 DIAGNOSIS — M25.552 CHRONIC HIP PAIN, BILATERAL: ICD-10-CM

## 2023-01-11 DIAGNOSIS — G89.29 CHRONIC HIP PAIN, BILATERAL: ICD-10-CM

## 2023-01-11 DIAGNOSIS — M25.551 CHRONIC HIP PAIN, BILATERAL: ICD-10-CM

## 2023-01-11 DIAGNOSIS — R53.1 WEAKNESS: ICD-10-CM

## 2023-01-11 DIAGNOSIS — M25.561 CHRONIC PAIN OF BOTH KNEES: ICD-10-CM

## 2023-01-11 DIAGNOSIS — G89.29 CHRONIC PAIN OF BOTH KNEES: ICD-10-CM

## 2023-01-11 DIAGNOSIS — M19.90 ARTHRITIS: ICD-10-CM

## 2023-01-11 NOTE — TELEPHONE ENCOUNTER
Caller: Jose Shaikh    Relationship: Self    Best call back number: [phone]    What orders are you requesting (i.e. lab or imaging): ORDER FOR CHAIR TO HELP STAND UP    In what timeframe would the patient need to come in: AS SOON AS POSSIBLE    Where will you receive your lab/imaging services: THROUGH MEDICARE    Additional notes: PATIENT IS REQUESTING ORDER FOR CHAIR TO HELP HIM STAND UP DUE TO ARTHRITIS IN HIP. MEDICARE IS NEEDING THIS ORDER.

## 2023-01-13 NOTE — TELEPHONE ENCOUNTER
Spoke with patient who is requesting a recliner lift chair due to his bilateral knee and hip pain. Contacting [facility] to see what the order needed to be placed. Pended referral.

## 2023-01-16 ENCOUNTER — OFFICE VISIT (OUTPATIENT)
Dept: WOUND CARE | Facility: HOSPITAL | Age: 63
End: 2023-01-16
Payer: MEDICARE

## 2023-01-16 VITALS
RESPIRATION RATE: 18 BRPM | TEMPERATURE: 97.4 F | DIASTOLIC BLOOD PRESSURE: 78 MMHG | HEART RATE: 79 BPM | SYSTOLIC BLOOD PRESSURE: 130 MMHG

## 2023-01-16 DIAGNOSIS — E11.621 DIABETIC ULCER OF LEFT HEEL ASSOCIATED WITH TYPE 2 DIABETES MELLITUS, LIMITED TO BREAKDOWN OF SKIN: ICD-10-CM

## 2023-01-16 DIAGNOSIS — E66.01 CLASS 3 SEVERE OBESITY DUE TO EXCESS CALORIES WITH SERIOUS COMORBIDITY AND BODY MASS INDEX (BMI) OF 45.0 TO 49.9 IN ADULT: ICD-10-CM

## 2023-01-16 DIAGNOSIS — T81.31XD POSTOPERATIVE WOUND DEHISCENCE, SUBSEQUENT ENCOUNTER: Primary | ICD-10-CM

## 2023-01-16 DIAGNOSIS — L08.9 INFECTED BLISTER OF RIGHT FOOT, INITIAL ENCOUNTER: ICD-10-CM

## 2023-01-16 DIAGNOSIS — L97.421 DIABETIC ULCER OF LEFT HEEL ASSOCIATED WITH TYPE 2 DIABETES MELLITUS, LIMITED TO BREAKDOWN OF SKIN: ICD-10-CM

## 2023-01-16 DIAGNOSIS — E11.42 DIABETIC POLYNEUROPATHY ASSOCIATED WITH TYPE 2 DIABETES MELLITUS: ICD-10-CM

## 2023-01-16 DIAGNOSIS — S90.821A INFECTED BLISTER OF RIGHT FOOT, INITIAL ENCOUNTER: ICD-10-CM

## 2023-01-16 DIAGNOSIS — Z89.431 STATUS POST AMPUTATION OF RIGHT FOOT THROUGH METATARSAL BONE: ICD-10-CM

## 2023-01-16 PROCEDURE — 87070 CULTURE OTHR SPECIMN AEROBIC: CPT | Performed by: EMERGENCY MEDICINE

## 2023-01-16 PROCEDURE — 99214 OFFICE O/P EST MOD 30 MIN: CPT | Performed by: EMERGENCY MEDICINE

## 2023-01-16 PROCEDURE — 87205 SMEAR GRAM STAIN: CPT | Performed by: EMERGENCY MEDICINE

## 2023-01-16 PROCEDURE — 11042 DBRDMT SUBQ TIS 1ST 20SQCM/<: CPT | Performed by: EMERGENCY MEDICINE

## 2023-01-16 RX ORDER — DOXYCYCLINE HYCLATE 100 MG/1
100 CAPSULE ORAL 2 TIMES DAILY
Qty: 14 CAPSULE | Refills: 0 | Status: SHIPPED | OUTPATIENT
Start: 2023-01-16 | End: 2023-01-23

## 2023-01-16 NOTE — PROGRESS NOTES
Chief Complaint  Wound Check (Wound check to bilateral feet ( ))    Subjective      Wound Check        Jose Shaikh  is a 62 y.o. diabetic male with a right MTT amputation that is healing by secondary intention.  He also has a wound on the left plantar foot.  He has a difficult social situation due to financial constraints and housing issues.      He was recently admitted to the hospital from 10/01 to 10/05/2021 for cellulitis and osteomyelitis of the right foot.  MRI revealed osteomyelitis of bones of the midfoot.  CTA showed two-vessel runoff to the foot.  Dr. Fuller did not feel that there was any vascular intervention that would help the patient heal better and that he would most likely need to proceed to a right sided BKA.  Patient declined.    Patient's housing issues have been addressed and resolved and he now has his own apartment.    He is using Padmini on the left heel wound and the right leg is getting a Betadine soaked gauze applied to the distal foot and the leg is being wrapped with an Unna boot.  He has home health coming out twice a week.  He has difficulty reaching the foot wounds himself.    Allergies:  Adhesive tape      Current Outpatient Medications:   •  alfuzosin (UROXATRAL) 10 MG 24 hr tablet, Take 10 mg by mouth Daily., Disp: , Rfl:   •  amoxicillin-clavulanate (AUGMENTIN) 875-125 MG per tablet, Take 1 tablet by mouth 2 (Two) Times a Day., Disp: , Rfl:   •  apixaban (ELIQUIS) 5 MG tablet tablet, Take 5 mg by mouth 2 (two) times a day., Disp: , Rfl:   •  Diclofenac Sodium (VOLTAREN) 1 % gel gel, APPLY 4 G TOPICALLY TO THE APPROPRIATE AREA AS DIRECTED 4 (FOUR) TIMES A DAY AS NEEDED (HIP PAIN)., Disp: 100 g, Rfl: 2  •  doxycycline (VIBRAMYCIN) 100 MG capsule, Take 1 capsule by mouth 2 (Two) Times a Day., Disp: , Rfl:   •  doxycycline (VIBRAMYCIN) 100 MG capsule, Take 1 capsule by mouth 2 (Two) Times a Day for 7 days. Do not take at the same time as any vitamin or mineral supplements., Disp:  14 capsule, Rfl: 0  •  glucose blood test strip, Test blood sugar 3 times a day, Disp: 100 each, Rfl: 5  •  insulin aspart (NovoLOG) 100 UNIT/ML injection, Inject 20 Units under the skin into the appropriate area as directed 3 (Three) Times a Day Before Meals. Take up to 20 units as instructed, Disp: 20 mL, Rfl: 3  •  insulin detemir (Levemir FlexTouch) 100 UNIT/ML injection, Inject 60 Units under the skin into the appropriate area as directed Daily., Disp: 60 mL, Rfl: 1  •  Insulin Lispro (HumaLOG KwikPen) 200 UNIT/ML solution pen-injector, Inject 30 Units under the skin into the appropriate area as directed 3 (Three) Times a Day Before Meals., Disp: 45 mL, Rfl: 1  •  Insulin Pen Needle (Pen Needles) 32G X 5 MM misc, 1 each 4 (Four) Times a Day., Disp: 400 each, Rfl: 1  •  lisinopril (PRINIVIL,ZESTRIL) 20 MG tablet, Take 20 mg by mouth Daily., Disp: , Rfl:   •  meloxicam (MOBIC) 7.5 MG tablet, Take 1 tablet by mouth Daily., Disp: 10 tablet, Rfl: 0  •  metFORMIN (GLUCOPHAGE) 1000 MG tablet, Take 1,000 mg by mouth 2 (two) times a day., Disp: , Rfl:   •  metoprolol succinate XL (TOPROL-XL) 50 MG 24 hr tablet, Take 50 mg by mouth Daily., Disp: , Rfl:   •  Pro Comfort Lancets 31G misc, , Disp: , Rfl:   •  simvastatin (ZOCOR) 20 MG tablet, Take 20 mg by mouth Every Night., Disp: , Rfl:   •  sodium hypochlorite (DAKIN'S 1/4 STRENGTH) 0.125 % solution topical solution 0.125%, Apply 10 mL topically to the appropriate area as directed 2 (Two) Times a Day., Disp: 1000 mL, Rfl: 1    Past Medical History:   Diagnosis Date   • Absence of toe of right foot    • Acute osteomyelitis of left calcaneus  8/18/2021   • Anxiety and depression    • Arthritis    • Claustrophobia    • Corns and callus    • Diabetic ulcer of left heel associated with type 2 DM 8/18/2021   • Diabetic ulcer of left heel associated with type 2 DM 7/6/2021   • Diabetic ulcer of right midfoot associated with type 2 DM 8/18/2021   • Difficulty walking    •  Essential hypertension 2021   • Hammertoe    • Hyperlipidemia LDL goal <100 2021   • Ingrown toenail    • Obesity    • Paroxysmal atrial fibrillation 2021   • Polyneuropathy    • Pressure ulcer, stage 1    • Tinea unguium    • Type 2 diabetes mellitus with polyneuropathy      Past Surgical History:   Procedure Laterality Date   • CYST REMOVAL      center of back; benign   • INCISION AND DRAINAGE ABSCESS      back   • INCISION AND DRAINAGE LEG Right 12/10/2021    Procedure: INCISION AND DRAINAGE LOWER EXTREMITY;  Surgeon: Ash Leyva DPM;  Location: [facility] OR;  Service: Podiatry;  Laterality: Right;   • OTHER SURGICAL HISTORY      Surgical clips left foot   • TOE SURGERY Right     Removal of 5th toe   • TRANS METATARSAL AMPUTATION Right 2021    Procedure: AMPUTATION TRANS METATARSAL;  Surgeon: Ash Leyva DPM;  Location: [facility] OR;  Service: Podiatry;  Laterality: Right;   • WRIST SURGERY Left     repair of injury     Social History     Socioeconomic History   • Marital status: Single   Tobacco Use   • Smoking status: Former     Packs/day: 0.00     Years: 1.00     Pack years: 0.00     Types: Cigarettes     Quit date: 1991     Years since quittin.4   • Smokeless tobacco: Never   • Tobacco comments:     quit at age 30   Vaping Use   • Vaping Use: Never used   Substance and Sexual Activity   • Alcohol use: Yes     Comment: Rare   • Drug use: Yes     Types: Marijuana     Comment: Daily   • Sexual activity: Defer           Objective     Vitals:    23 0929   BP: 130/78   BP Location: Left arm   Patient Position: Sitting   Pulse: 79   Resp: 18   Temp: 97.4 °F (36.3 °C)   TempSrc: Temporal   PainSc: 0-No pain     There is no height or weight on file to calculate BMI.     Height 74 inches, weight 355 pounds, BMI 45.56    STEADI Fall Risk Assessment has not been completed.     Review of Systems     ROS:  No fevers, chills, sweats, or body aches.     I have  reviewed the HPI and ROS as documented by MA/RN. Shea Daniels MD    Physical Exam     NAD  Normocephalic, atraumatic  EOMI, no scleral icterus  No respiratory distress, no cough  AAOx3, pleasant, cooperative  LLE: Left plantar wound with severe recurrent callus and deep hemosiderin deposition, periwound maceration, and significant wound.  Wound is much larger than when I last saw the patient in the fall but slightly smaller from his most recent [facility] visit.  TTP with deep palpation laterally but no pain with sharp debridement.  No evidence of infection.   RLE: Right foot wound base healthier and there is decreased periwound maceration and damage.  Wound is smaller, no TTP, no evidence of infection.  Unfortunately, toward the posterior portion of the foot towards the heel there are multiple small blisters scattered diffusely that contain thin purulent drainage.  Culture obtained.    See photos for details.    RLE:                          LLE:                    Result Review :  The following data was reviewed by: Shea Daniels MD on 01/16/2023:    Prior notes and images.  Prior wound culture.        Wound debridement  Performed by: Shea Daniels MD  Authorized by: Shea Daniels MD     Correct patient: Identification verified by two methods:     Verbally and Date of birth  Correct procedure/consent    Correct site/side    Correct equipment as applicable    How many wounds are you performing a debridement on?:  1  Wound 1:     Nursing Documentation:     Wound Location:  Left foot  Measurements:     The total amount debrided on this wound was under 20 cm2.     Provider Documentation    Debridement type:  Sharp/excisional    Betadine      Other:  Sterile pickups and 10 blade     None    Tissue debrided:  Moderate    Level removed:  Subcutaneous    Patient tolerance:  Good    Hemostasis achieved by:  Silver nitrate    Wound Bed Post Debridement: See Photo                     Assessment and Plan   Diagnoses and  all orders for this visit:    1. Postoperative wound dehiscence, subsequent encounter (Primary)  -     Bandage UNNA Boot With / ZINC With / O CALAMINE LF 4IN 10YD    2. Infected blister of right foot, initial encounter  -     Wound Culture - Wound, Foot, Right  -     doxycycline (VIBRAMYCIN) 100 MG capsule; Take 1 capsule by mouth 2 (Two) Times a Day for 7 days. Do not take at the same time as any vitamin or mineral supplements.  Dispense: 14 capsule; Refill: 0    3. Diabetic ulcer of left heel associated with type 2 diabetes mellitus, limited to breakdown of skin ([facility])  -     Bandage UNNA Boot With / ZINC With / O CALAMINE LF 4IN 10YD  -     Wound debridement    4. 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 Diabetic polyneuropathy associated with type 2 diabetes mellitus ([facility])    6. Class 3 severe obesity due to excess calories with serious comorbidity and body mass index (BMI) of 45.0 to 49.9 in adult ([facility])        Continue Betadine soaked gauze to right foot and wrapped with an Unna boot.  We will have them do this on the left foot as well. All the small blistered areas along the proximal right foot can have Betadine applied to them as well. This can be done twice weekly by home health.      Doxycycline for infected blisters of right foot, monitor closely for changes.  Culture pending.  Patient had a recent wound culture positive for Pseudomonas but the drainage from the wounds is cloudy pale white, no green drainage from any of the wounds or blisters, will start doxycycline for now.    Offloading of both wounds is much as possible.    Work on improving diabetic control and blood sugar levels.      Recheck 4 weeks, sooner if problems arise.    Patient was given instructions and counseling regarding their condition or for health maintenance advice, as well as the wound care plan and recommendations. They understand and agree with the plan.  They will follow back up here in the clinic  but are instructed to contact us in the interim should they have any new, returning, or worsening symptoms or concerns. Please see specific information pulled into the AVS if appropriate.     Dragon Dictation utilized for chart completion.      Follow Up   Return in about 4 weeks (around 2/13/2023) for Recheck.      Shea Daniels MD

## 2023-01-19 LAB
BACTERIA SPEC AEROBE CULT: NORMAL
GRAM STN SPEC: NORMAL

## 2023-01-27 ENCOUNTER — OFFICE VISIT (OUTPATIENT)
Dept: INTERNAL MEDICINE | Facility: CLINIC | Age: 63
End: 2023-01-27
Payer: MEDICARE

## 2023-01-27 ENCOUNTER — TELEPHONE (OUTPATIENT)
Dept: WOUND CARE | Facility: HOSPITAL | Age: 63
End: 2023-01-27
Payer: MEDICARE

## 2023-01-27 VITALS
RESPIRATION RATE: 18 BRPM | HEIGHT: 74 IN | OXYGEN SATURATION: 96 % | HEART RATE: 91 BPM | SYSTOLIC BLOOD PRESSURE: 122 MMHG | WEIGHT: 315 LBS | BODY MASS INDEX: 40.43 KG/M2 | DIASTOLIC BLOOD PRESSURE: 62 MMHG | TEMPERATURE: 98.2 F

## 2023-01-27 DIAGNOSIS — E11.621 TYPE 2 DIABETES MELLITUS WITH FOOT ULCER, WITH LONG-TERM CURRENT USE OF INSULIN: ICD-10-CM

## 2023-01-27 DIAGNOSIS — M65.341 TRIGGER RING FINGER OF RIGHT HAND: ICD-10-CM

## 2023-01-27 DIAGNOSIS — I10 ESSENTIAL HYPERTENSION: Primary | ICD-10-CM

## 2023-01-27 DIAGNOSIS — L98.9 SKIN LESION: ICD-10-CM

## 2023-01-27 DIAGNOSIS — E66.01 CLASS 3 SEVERE OBESITY DUE TO EXCESS CALORIES WITH SERIOUS COMORBIDITY AND BODY MASS INDEX (BMI) OF 45.0 TO 49.9 IN ADULT: Chronic | ICD-10-CM

## 2023-01-27 DIAGNOSIS — Z79.4 TYPE 2 DIABETES MELLITUS WITH FOOT ULCER, WITH LONG-TERM CURRENT USE OF INSULIN: ICD-10-CM

## 2023-01-27 DIAGNOSIS — L97.509 TYPE 2 DIABETES MELLITUS WITH FOOT ULCER, WITH LONG-TERM CURRENT USE OF INSULIN: ICD-10-CM

## 2023-01-27 DIAGNOSIS — M70.62 TROCHANTERIC BURSITIS OF LEFT HIP: ICD-10-CM

## 2023-01-27 PROCEDURE — 99214 OFFICE O/P EST MOD 30 MIN: CPT | Performed by: NURSE PRACTITIONER

## 2023-01-27 RX ORDER — METOPROLOL SUCCINATE 50 MG/1
50 TABLET, EXTENDED RELEASE ORAL DAILY
Qty: 90 TABLET | Refills: 1 | Status: SHIPPED | OUTPATIENT
Start: 2023-01-27

## 2023-01-27 RX ORDER — LISINOPRIL 20 MG/1
20 TABLET ORAL DAILY
Qty: 90 TABLET | Refills: 1 | Status: SHIPPED | OUTPATIENT
Start: 2023-01-27

## 2023-01-27 RX ORDER — MELOXICAM 7.5 MG/1
7.5 TABLET ORAL DAILY PRN
Qty: 20 TABLET | Refills: 0 | Status: SHIPPED | OUTPATIENT
Start: 2023-01-27 | End: 2023-01-27

## 2023-01-27 RX ORDER — SIMVASTATIN 20 MG
20 TABLET ORAL NIGHTLY
Qty: 90 TABLET | Refills: 1 | Status: SHIPPED | OUTPATIENT
Start: 2023-01-27

## 2023-01-27 RX ORDER — INSULIN DETEMIR 100 [IU]/ML
60 INJECTION, SOLUTION SUBCUTANEOUS DAILY
Qty: 60 ML | Refills: 1 | Status: SHIPPED | OUTPATIENT
Start: 2023-01-27

## 2023-01-27 RX ORDER — DULAGLUTIDE 3 MG/.5ML
INJECTION, SOLUTION SUBCUTANEOUS
Qty: 2 ML | Refills: 1 | Status: SHIPPED | OUTPATIENT
Start: 2023-01-27 | End: 2023-02-07

## 2023-01-27 RX ORDER — SENNOSIDES 8.6 MG
650 CAPSULE ORAL EVERY 8 HOURS PRN
COMMUNITY

## 2023-01-27 NOTE — PROGRESS NOTES
"Chief Complaint  Diabetes (2 month follow up ), Hypertension, Obesity, Hip Pain (Left hip- Fall- last month), and Groin Pain (2 month)    Subjective          Jose Shaikh presents to [facility]  History of Present Illness  DM-managed by Kami  Foot ulcer-recent f/u with vasc/wound care  Hip pain--fall onto left hip into door frame. He had xray at that time   Hip pain of lateral hip and anterior hip/groin  He reports pain is better with sitting up but worse with lying flat  He reports taking a lot of motrin  Reports that he is not sleeping well due to hip    Skin lesion of face, present and growing  Does not wish to wait for derm appt  Objective   Vital Signs:   /62 (BP Location: Right arm, Patient Position: Sitting, Cuff Size: Adult)   Pulse 91   Temp 98.2 °F (36.8 °C)   Resp 18   Ht 188 cm (74\")   Wt (!) 168 kg (370 lb)   SpO2 96%   BMI 47.51 kg/m²     Physical Exam  Vitals and nursing note reviewed.   Constitutional:       General: He is not in acute distress.     Appearance: Normal appearance.   HENT:      Head: Normocephalic and atraumatic.      Right Ear: External ear normal.      Left Ear: External ear normal.      Nose: Nose normal.      Mouth/Throat:      Mouth: Mucous membranes are moist.   Eyes:      Conjunctiva/sclera: Conjunctivae normal.   Cardiovascular:      Rate and Rhythm: Normal rate and regular rhythm.      Pulses: Normal pulses.      Heart sounds: Normal heart sounds. No murmur heard.    No friction rub. No gallop.   Pulmonary:      Effort: Pulmonary effort is normal. No respiratory distress.      Breath sounds: No wheezing, rhonchi or rales.   Musculoskeletal:      Cervical back: Neck supple.      Right lower leg: No edema.      Left lower leg: No edema.   Skin:     General: Skin is warm and dry.      Findings: Lesion (raised lesion of right cheek) present.   Neurological:      General: No focal deficit present.      Mental Status: " He is alert and oriented to person, place, and time.   Psychiatric:         Mood and Affect: Mood normal.         Behavior: Behavior normal.        Result Review :          Cryotherapy, Skin Lesion    Date/Time: 1/27/2023 3:45 PM  Performed by: Delia Cervantes APRN  Authorized by: Delia Cervantes APRN   Local anesthesia used: no    Anesthesia:  Local anesthesia used: no    Sedation:  Patient sedated: no    Patient tolerance: patient tolerated the procedure well with no immediate complications  Comments: Cryotherapy performed today after risk-benefit discussion and consent.  Lesions sprayed with liquid nitrogen for 5 pulsatile sec x 3 rounds.  Patient tolerated well.  Care instructions provided.            Assessment and Plan    Diagnoses and all orders for this visit:    1. Essential hypertension (Primary)  Comments:  Well-controlled.  Continue current meds.    2. Class 3 severe obesity due to excess calories with serious comorbidity and body mass index (BMI) of 45.0 to 49.9 in adult ([facility])  Comments:  Find to be more active after foot is healed in the coming weeks.    3. Type 2 diabetes mellitus with foot ulcer, with long-term current use of insulin ([facility])  Comments:  Managed by Kami.  He reports eating healthier in the last few weeks    4. Trigger ring finger of right hand  Comments:  Ortho for injection  Orders:  -     Ambulatory Referral to Orthopedic Surgery    5. Trochanteric bursitis of left hip  Comments:  Likely trochanteric bursitis.  Given body habitus, will send to Ortho for treatment/injection  Orders:  -     Ambulatory Referral to Orthopedic Surgery    6. Skin lesion  Comments:  He describes therapy in the office.  Patient tolerated well.    Other orders  -     insulin detemir (Levemir FlexTouch) 100 UNIT/ML injection; Inject 60 Units under the skin into the appropriate area as directed Daily.  Dispense: 60 mL; Refill: 1  -     lisinopril (PRINIVIL,ZESTRIL) 20 MG tablet; Take 1 tablet by mouth Daily.   Dispense: 90 tablet; Refill: 1  -     metFORMIN (GLUCOPHAGE) 1000 MG tablet; Take 1 tablet by mouth 2 (Two) Times a Day With Meals.  Dispense: 360 tablet; Refill: 1  -     Discontinue: meloxicam (MOBIC) 7.5 MG tablet; Take 1 tablet by mouth Daily As Needed for Moderate Pain.  Dispense: 20 tablet; Refill: 0  -     metoprolol succinate XL (TOPROL-XL) 50 MG 24 hr tablet; Take 1 tablet by mouth Daily.  Dispense: 90 tablet; Refill: 1  -     simvastatin (ZOCOR) 20 MG tablet; Take 1 tablet by mouth Every Night.  Dispense: 90 tablet; Refill: 1  -     Cryotherapy, Skin Lesion              Follow Up   Return in about 6 weeks (around 3/10/2023).  Patient was given instructions and counseling regarding his condition or for health maintenance advice. Please see specific information pulled into the AVS if appropriate.

## 2023-01-30 ENCOUNTER — OFFICE VISIT (OUTPATIENT)
Dept: WOUND CARE | Facility: HOSPITAL | Age: 63
End: 2023-01-30
Payer: MEDICARE

## 2023-01-30 VITALS
HEART RATE: 97 BPM | DIASTOLIC BLOOD PRESSURE: 84 MMHG | SYSTOLIC BLOOD PRESSURE: 158 MMHG | RESPIRATION RATE: 20 BRPM | TEMPERATURE: 97.2 F

## 2023-01-30 DIAGNOSIS — T81.31XD POSTOPERATIVE WOUND DEHISCENCE, SUBSEQUENT ENCOUNTER: Primary | ICD-10-CM

## 2023-01-30 DIAGNOSIS — Z89.431 STATUS POST AMPUTATION OF RIGHT FOOT THROUGH METATARSAL BONE: ICD-10-CM

## 2023-01-30 DIAGNOSIS — E66.01 CLASS 3 SEVERE OBESITY DUE TO EXCESS CALORIES WITH SERIOUS COMORBIDITY AND BODY MASS INDEX (BMI) OF 45.0 TO 49.9 IN ADULT: ICD-10-CM

## 2023-01-30 DIAGNOSIS — E11.42 DIABETIC POLYNEUROPATHY ASSOCIATED WITH TYPE 2 DIABETES MELLITUS: ICD-10-CM

## 2023-01-30 DIAGNOSIS — E11.621 DIABETIC ULCER OF LEFT HEEL ASSOCIATED WITH TYPE 2 DIABETES MELLITUS, LIMITED TO BREAKDOWN OF SKIN: ICD-10-CM

## 2023-01-30 DIAGNOSIS — L97.421 DIABETIC ULCER OF LEFT HEEL ASSOCIATED WITH TYPE 2 DIABETES MELLITUS, LIMITED TO BREAKDOWN OF SKIN: ICD-10-CM

## 2023-01-30 PROCEDURE — 97597 DBRDMT OPN WND 1ST 20 CM/<: CPT | Performed by: EMERGENCY MEDICINE

## 2023-01-30 PROCEDURE — 11042 DBRDMT SUBQ TIS 1ST 20SQCM/<: CPT | Performed by: EMERGENCY MEDICINE

## 2023-01-30 PROCEDURE — 99212 OFFICE O/P EST SF 10 MIN: CPT | Performed by: EMERGENCY MEDICINE

## 2023-01-30 NOTE — PROGRESS NOTES
Chief Complaint  Wound Check (BILATERAL FOOT ULCERS (BS:164) )    Subjective      Wound Check        Jose Shaikh  is a 62 y.o. diabetic male with a right MTT amputation that is healing by secondary intention.  He also has a wound on the left plantar foot.  He has a difficult social situation due to financial constraints and housing issues.      He was recently admitted to the hospital from 10/01 to 10/05/2022 for cellulitis and osteomyelitis of the right foot.  MRI revealed osteomyelitis of bones of the midfoot.  CTA showed two-vessel runoff to the foot.  Dr. Fuller did not feel that there was any vascular intervention that would help the patient heal better and that he would most likely need to proceed to a right sided BKA.  Patient declined.  He got 4 weeks of doxycycline and Augmentin.  He has previously undergone hyperbaric oxygen therapy and did not want to do so again.    Patient's housing issues have been addressed and resolved and he now has his own apartment.    He is using a Betadine soaked gauze applied to bilateral foot wounds and the legs are being wrapped with Unna boots.  He has home health coming out twice a week.  He has difficulty reaching the foot wounds himself.  He says most recently they ran out of Betadine and so an Unna boot got put on his legs without any other dressing.  He also did not want to have it changed when they came back out and did not have more of the material so his current Unna boots have been on for a full week.    Allergies:  Adhesive tape      Current Outpatient Medications:   •  acetaminophen (Arthritis Pain) 650 MG 8 hr tablet, Take 650 mg by mouth Every 8 (Eight) Hours As Needed for Mild Pain., Disp: , Rfl:   •  apixaban (ELIQUIS) 5 MG tablet tablet, Take 5 mg by mouth 2 (two) times a day., Disp: , Rfl:   •  glucose blood test strip, Test blood sugar 3 times a day, Disp: 100 each, Rfl: 5  •  insulin aspart (NovoLOG) 100 UNIT/ML injection, Inject 20 Units under the skin  into the appropriate area as directed 3 (Three) Times a Day Before Meals. Take up to 20 units as instructed, Disp: 20 mL, Rfl: 3  •  insulin detemir (Levemir FlexTouch) 100 UNIT/ML injection, Inject 60 Units under the skin into the appropriate area as directed Daily., Disp: 60 mL, Rfl: 1  •  Insulin Lispro (HumaLOG KwikPen) 200 UNIT/ML solution pen-injector, Inject 30 Units under the skin into the appropriate area as directed 3 (Three) Times a Day Before Meals., Disp: 45 mL, Rfl: 1  •  Insulin Pen Needle (Pen Needles) 32G X 5 MM misc, 1 each 4 (Four) Times a Day., Disp: 400 each, Rfl: 1  •  lisinopril (PRINIVIL,ZESTRIL) 20 MG tablet, Take 1 tablet by mouth Daily., Disp: 90 tablet, Rfl: 1  •  metFORMIN (GLUCOPHAGE) 1000 MG tablet, Take 1 tablet by mouth 2 (Two) Times a Day With Meals., Disp: 360 tablet, Rfl: 1  •  metoprolol succinate XL (TOPROL-XL) 50 MG 24 hr tablet, Take 1 tablet by mouth Daily., Disp: 90 tablet, Rfl: 1  •  Pro Comfort Lancets 31G misc, , Disp: , Rfl:   •  simvastatin (ZOCOR) 20 MG tablet, Take 1 tablet by mouth Every Night., Disp: 90 tablet, Rfl: 1  •  Trulicity 3 MG/0.5ML solution pen-injector, INJECT ONE SYRINGE UNDER THE SKIN ONCE WEEKLY, Disp: 2 mL, Rfl: 1    Past Medical History:   Diagnosis Date   • Absence of toe of right foot    • Acute osteomyelitis of left calcaneus  8/18/2021   • Anxiety and depression    • Arthritis    • Claustrophobia    • Corns and callus    • Diabetic ulcer of left heel associated with type 2 DM 8/18/2021   • Diabetic ulcer of left heel associated with type 2 DM 7/6/2021   • Diabetic ulcer of right midfoot associated with type 2 DM 8/18/2021   • Difficulty walking    • Essential hypertension 8/31/2021   • Hammertoe    • Hyperlipidemia LDL goal <100 8/31/2021   • Ingrown toenail    • Obesity    • Paroxysmal atrial fibrillation 8/31/2021   • Polyneuropathy    • Pressure ulcer, stage 1    • Tinea unguium    • Type 2 diabetes mellitus with polyneuropathy      Past  Surgical History:   Procedure Laterality Date   • CYST REMOVAL      center of back; benign   • INCISION AND DRAINAGE ABSCESS      back   • INCISION AND DRAINAGE LEG Right 12/10/2021    Procedure: INCISION AND DRAINAGE LOWER EXTREMITY;  Surgeon: Ash Leyva DPM;  Location: [facility] OR;  Service: Podiatry;  Laterality: Right;   • OTHER SURGICAL HISTORY      Surgical clips left foot   • TOE SURGERY Right     Removal of 5th toe   • TRANS METATARSAL AMPUTATION Right 2021    Procedure: AMPUTATION TRANS METATARSAL;  Surgeon: Ash Leyva DPM;  Location: [facility] MAIN OR;  Service: Podiatry;  Laterality: Right;   • WRIST SURGERY Left     repair of injury     Social History     Socioeconomic History   • Marital status: Single   Tobacco Use   • Smoking status: Former     Packs/day: 0.00     Years: 1.00     Pack years: 0.00     Types: Cigarettes     Quit date: 1991     Years since quittin.4   • Smokeless tobacco: Never   • Tobacco comments:     quit at age 30   Vaping Use   • Vaping Use: Never used   Substance and Sexual Activity   • Alcohol use: Yes     Comment: Rare   • Drug use: Yes     Types: Marijuana     Comment: Daily   • Sexual activity: Defer           Objective     Vitals:    23 1009   BP: 158/84   BP Location: Left arm   Patient Position: Sitting   Pulse: 97   Resp: 20   Temp: 97.2 °F (36.2 °C)   TempSrc: Temporal   PainSc: 0-No pain     There is no height or weight on file to calculate BMI.     Height 74 inches, weight 355 pounds, BMI 45.56    STEADI Fall Risk Assessment has not been completed.     Review of Systems     ROS:  No fevers, chills, sweats, or body aches.     I have reviewed the HPI and ROS as documented by MA/RN. Shea Daniels MD    Physical Exam     NAD  Normocephalic, atraumatic  EOMI, no scleral icterus  No respiratory distress, no cough  AAOx3, pleasant, cooperative  LLE: Left plantar wound with severely macerated diffuse recurrent callus and deep  hemosiderin deposition, periwound maceration, and significant wound.  Wound has slightly decreased depth and the base appears relatively healthy but the periwound maceration is severe and problematic.  RLE: Right foot wound base appears fairly healthy and the wound is slightly smaller.  Unfortunately there is significantly increased periwound maceration and callus.  The small blisters the patient had at his last visit have resolved.  No evidence of acute infection of either wound.    See photos for details.    RLE:          LLE:            Result Review :  The following data was reviewed by: Shea Daniels MD on 01/30/2023:    Prior notes and images.  Prior wound culture.        Wound debridement  Performed by: Shea Daniels MD  Authorized by: Shea Daniels MD     Correct patient: Identification verified by two methods:     Verbally and Date of birth  Correct procedure/consent    Correct site/side    Correct equipment as applicable    How many wounds are you performing a debridement on?:  2  Wound 1:     Nursing Documentation:     Wound Location:  Right foot  Measurements:     The total amount debrided on this wound was under 20 cm2.     Provider Documentation    Debridement type:  Sharp/excisional    Betadine      Other:  Sterile pickups and 10 blade     None    Tissue debrided:  Moderate    Level removed:  Skin    Patient tolerance:  Good    Hemostasis achieved by:  Silver nitrate    Wound Bed Post Debridement: See Photo    Wound 2:     Wound Location:  Left foot   Measurements:    The total amount debrided on this wound was under 20 cm2.      Provider Documentation:     Debridement type:  Sharp/Excisional    Betadine      Other:  Sterile pickups and 10 blade     None    Tissue debrided:  Large    Level removed:  Subcutaneous    Patient tolerance:  Good    Hemostasis achieved by:  Silver Nitrate    Wound Bed Post Debridement: See Photo                             Assessment and Plan   Diagnoses and all  orders for this visit:    1. Postoperative wound dehiscence, subsequent encounter (Primary)  -     Bandage UNNA Boot With / ZINC With / O CALAMINE LF 4IN 10YD  -     Wound debridement    2. Diabetic ulcer of left heel associated with type 2 diabetes mellitus, limited to breakdown of skin ([facility])  -     Wound debridement    3. Status post amputation of right foot through metatarsal bone ([facility])    4. Diabetic polyneuropathy associated with type 2 diabetes mellitus ([facility])    5. Class 3 severe obesity due to excess calories with serious comorbidity and body mass index (BMI) of 45.0 to 49.9 in adult ([facility])        Patient has been doing well with the Unna boots but unfortunately he has significant maceration today.  There was issues with the necessary supplies.  He and he declines having the Unna boots changed at his most recent home health visit so they have been on an entire week.  He is advised to allow it to be changed twice a week as recommended.  We will have them apply Betadine around the edges of the wound and the macerated tissues and then cover the entire area with dry Aquacel Ag.  Aquacel Ag or similar Hydrofiber with silver should be packed into the full depth of the left foot wound.    Both legs then wrapped with Unna boots, all dressings performed twice weekly.    Offloading of both wounds is much as possible.    Work on improving diabetic control and blood sugar levels.      Recheck 4 weeks, sooner if problems arise.    Patient was given instructions and counseling regarding their condition or for health maintenance advice, as well as the wound care plan and recommendations. They understand and agree with the plan.  They will follow back up here in the clinic but are instructed to contact us in the interim should they have any new, returning, or worsening symptoms or concerns. Please see specific information pulled into the AVS if appropriate.     Dragon Dictation utilized for chart completion.      Follow Up    Return in about 4 weeks (around 2/27/2023) for Recheck.      Shea Daniels MD

## 2023-01-31 ENCOUNTER — TELEPHONE (OUTPATIENT)
Dept: INTERNAL MEDICINE | Facility: CLINIC | Age: 63
End: 2023-01-31
Payer: MEDICARE

## 2023-01-31 NOTE — TELEPHONE ENCOUNTER
Caller: Jose Shaikh    Relationship: Self    Best call back number: [phone]    What is the best time to reach you: ANYTIME    Who are you requesting to speak with (clinical staff, provider,  specific staff member): NURSE         What was the call regarding: PATIENT IS CALLING STATING THAT HE HAS NOT HAD HIS ELIQUIS FOR 3 DAYS NOW AND JUST WANTED TO MAKE SURE THAT IT WAS GETTING TAKEN CARE OF TODAY IF POSSIBLE.  PLEASE ADVISE PATIENT WHEN SENT TO PHARMACY

## 2023-02-01 ENCOUNTER — PATIENT ROUNDING (BHMG ONLY) (OUTPATIENT)
Dept: WOUND CARE | Facility: HOSPITAL | Age: 63
End: 2023-02-01
Payer: MEDICARE

## 2023-02-01 NOTE — PROGRESS NOTES
Did we do a good job with your visit?  Yes      Do you have your medications and/or supplies?  Yes      Do you have a follow-up appointment? Yes      Do you have any suggestions to improve our service?  No

## 2023-02-06 ENCOUNTER — TELEPHONE (OUTPATIENT)
Dept: DIABETES SERVICES | Facility: HOSPITAL | Age: 63
End: 2023-02-06
Payer: MEDICARE

## 2023-02-06 NOTE — TELEPHONE ENCOUNTER
Pt called and stated he is not able to get his Trulicity due to the back order,  and is asking if you want him to try another medication, Please advise

## 2023-02-07 RX ORDER — DULAGLUTIDE 1.5 MG/.5ML
1.5 INJECTION, SOLUTION SUBCUTANEOUS
Qty: 4 ML | Refills: 2 | Status: SHIPPED | OUTPATIENT
Start: 2023-02-07 | End: 2023-03-01

## 2023-02-07 NOTE — TELEPHONE ENCOUNTER
Pt stated he would like you to send the 1.5 strength to [place] [facility] and he will take the 2 injections weekly until the 3mg becomes darío.

## 2023-02-14 ENCOUNTER — TELEPHONE (OUTPATIENT)
Dept: WOUND CARE | Facility: HOSPITAL | Age: 63
End: 2023-02-14
Payer: MEDICARE

## 2023-02-14 NOTE — TELEPHONE ENCOUNTER
PATIENT CALLED TO LET US KNOW THAT HE TOLD HIS HOME HEALTH TO NOT DO AN UNNA BOOT ON HIS LEFT LEG. SHE WRAPPED IT WITH BETADINE AND AQUACEL INSTEAD. HE STATED HE WILL LET HER APPLY THE UNNA BOOT ON Friday 02/17/2023. HE JUST WANTED TO LET US KNOW

## 2023-02-20 ENCOUNTER — TELEPHONE (OUTPATIENT)
Dept: DIABETES SERVICES | Facility: HOSPITAL | Age: 63
End: 2023-02-20
Payer: MEDICARE

## 2023-02-22 ENCOUNTER — TELEPHONE (OUTPATIENT)
Dept: INTERNAL MEDICINE | Facility: CLINIC | Age: 63
End: 2023-02-22
Payer: MEDICARE

## 2023-02-22 NOTE — TELEPHONE ENCOUNTER
Do you want me to just re order the lift chair on the [employer] [employer] website. We have been doing this and it is simple and quick.

## 2023-02-22 NOTE — TELEPHONE ENCOUNTER
Caller: RJ- [facility]    Relationship: Other    Best call back number: [phone]      What was the call regarding:   RJ SAID THAT THE PATIENT SAID HE IS IN  NEED OF A LIFT CHAIR. SHE SAID THAT A PA IS NEEDED FOR THAT.    SHE SAID THE PA NEEDS TO BE SENT TO [facility]. SHE SAID THEY WILL BE SUPPLYING THE LIFT CHAIR.       PLEASE ADVISE PATIENT

## 2023-02-23 NOTE — TELEPHONE ENCOUNTER
[facility]: [facility]    Relationship: Other    What was the call regarding: PATIENT STATES HE DOESN'T WANT TO USE [facility] BECAUSE OF A BAD EXPERIENCE HE HAD WITH THEM IN THE PAST. PATIENT STATES HE WOULD PREFER IT GO TO [place] OR ANOTHER PLACE IN [place].

## 2023-02-23 NOTE — TELEPHONE ENCOUNTER
I have spoken with [facility]: [facility], [facility], [facility], and [facility] and all either do not carry chair lifts or insurance will not cover the chair lift. Patient will have to get the chair lift out of pocket.

## 2023-02-24 ENCOUNTER — TELEPHONE (OUTPATIENT)
Dept: WOUND CARE | Facility: HOSPITAL | Age: 63
End: 2023-02-24
Payer: MEDICARE

## 2023-02-24 NOTE — TELEPHONE ENCOUNTER
Nancy, nurse from , called stating pt is still refusing unna boot wraps to LLE. She is packing the LLE wound with jose ag, painting the wound edges with betadine and wrapping with 4x4 gauze and rolled gauze.     Nancy also states right anterior foot is irritated and she applied betadine gauze pad to anterior foot under unna boot.     Pt is scheduled with wound care this coming up Monday 2/27/23. She did not feel there is an immediate need for wound assessment, and she felt Monday's appointment was sufficient.

## 2023-02-27 ENCOUNTER — OFFICE VISIT (OUTPATIENT)
Dept: WOUND CARE | Facility: HOSPITAL | Age: 63
End: 2023-02-27
Payer: MEDICARE

## 2023-02-27 ENCOUNTER — TELEPHONE (OUTPATIENT)
Dept: INTERNAL MEDICINE | Facility: CLINIC | Age: 63
End: 2023-02-27
Payer: MEDICARE

## 2023-02-27 VITALS
TEMPERATURE: 98.8 F | RESPIRATION RATE: 18 BRPM | SYSTOLIC BLOOD PRESSURE: 158 MMHG | HEART RATE: 88 BPM | DIASTOLIC BLOOD PRESSURE: 80 MMHG

## 2023-02-27 DIAGNOSIS — Z60.9 HIGH RISK SOCIAL SITUATION: ICD-10-CM

## 2023-02-27 DIAGNOSIS — Z89.431 STATUS POST AMPUTATION OF RIGHT FOOT THROUGH METATARSAL BONE: ICD-10-CM

## 2023-02-27 DIAGNOSIS — E11.42 DIABETIC POLYNEUROPATHY ASSOCIATED WITH TYPE 2 DIABETES MELLITUS: ICD-10-CM

## 2023-02-27 DIAGNOSIS — E11.621 DIABETIC ULCER OF LEFT HEEL ASSOCIATED WITH TYPE 2 DIABETES MELLITUS, LIMITED TO BREAKDOWN OF SKIN: ICD-10-CM

## 2023-02-27 DIAGNOSIS — T81.31XD POSTOPERATIVE WOUND DEHISCENCE, SUBSEQUENT ENCOUNTER: Primary | ICD-10-CM

## 2023-02-27 DIAGNOSIS — E66.01 CLASS 3 SEVERE OBESITY DUE TO EXCESS CALORIES WITH SERIOUS COMORBIDITY AND BODY MASS INDEX (BMI) OF 45.0 TO 49.9 IN ADULT: ICD-10-CM

## 2023-02-27 DIAGNOSIS — L97.421 DIABETIC ULCER OF LEFT HEEL ASSOCIATED WITH TYPE 2 DIABETES MELLITUS, LIMITED TO BREAKDOWN OF SKIN: ICD-10-CM

## 2023-02-27 PROCEDURE — 11045 DBRDMT SUBQ TISS EACH ADDL: CPT | Performed by: EMERGENCY MEDICINE

## 2023-02-27 PROCEDURE — 87077 CULTURE AEROBIC IDENTIFY: CPT | Performed by: EMERGENCY MEDICINE

## 2023-02-27 PROCEDURE — 87186 SC STD MICRODIL/AGAR DIL: CPT | Performed by: EMERGENCY MEDICINE

## 2023-02-27 PROCEDURE — 87070 CULTURE OTHR SPECIMN AEROBIC: CPT | Performed by: EMERGENCY MEDICINE

## 2023-02-27 PROCEDURE — 11042 DBRDMT SUBQ TIS 1ST 20SQCM/<: CPT | Performed by: EMERGENCY MEDICINE

## 2023-02-27 PROCEDURE — 97597 DBRDMT OPN WND 1ST 20 CM/<: CPT | Performed by: EMERGENCY MEDICINE

## 2023-02-27 PROCEDURE — 87205 SMEAR GRAM STAIN: CPT | Performed by: EMERGENCY MEDICINE

## 2023-02-27 SDOH — SOCIAL STABILITY - SOCIAL INSECURITY: PROBLEM RELATED TO SOCIAL ENVIRONMENT, UNSPECIFIED: Z60.9

## 2023-02-27 NOTE — TELEPHONE ENCOUNTER
Left detailed message that the insurance will not pay for the lift chair and the medical supply places near here do not carry them or insurance will not pay for it.

## 2023-03-06 DIAGNOSIS — L08.9 WOUND INFECTION: Primary | ICD-10-CM

## 2023-03-06 DIAGNOSIS — T14.8XXA WOUND INFECTION: Primary | ICD-10-CM

## 2023-03-06 RX ORDER — AMOXICILLIN AND CLAVULANATE POTASSIUM 875; 125 MG/1; MG/1
1 TABLET, FILM COATED ORAL 2 TIMES DAILY
Qty: 14 TABLET | Refills: 0 | Status: SHIPPED | OUTPATIENT
Start: 2023-03-06 | End: 2023-03-13

## 2023-03-09 ENCOUNTER — TELEPHONE (OUTPATIENT)
Dept: WOUND CARE | Facility: HOSPITAL | Age: 63
End: 2023-03-09
Payer: MEDICARE

## 2023-03-09 NOTE — TELEPHONE ENCOUNTER
[facility] NURSE CALLED TO LET US KNOW THAT THE PATIENT IS HAVING MORE MACERATION ON HIS RIGHT FOOT. PATIENT HAS APPOINTMENT TO SEE US ON 03/13/2023. I ADVISED HER TO MAKE SURE HE IS CHANGING THE AQUACEL AG EVERYDAY IF HE IS HAVING COPIOUS AMOUNTS OF DRAINAGE, AND TO TELL THE PATIENT TO PLEASE GO TO URGENT CARE OR THE ER IF HE STARTS HAVING ANY REDNESS, WARMTH, OR ANY OTHER SIGNS OF WORSENING OR INFECTION BETWEEN NOW AND WHEN HE COMES IN TO SEE US ON 03/13/2023. ASMITA VERBALIZED UNDERSTANDING

## 2023-03-13 ENCOUNTER — OFFICE VISIT (OUTPATIENT)
Dept: WOUND CARE | Facility: HOSPITAL | Age: 63
End: 2023-03-13
Payer: MEDICARE

## 2023-03-13 VITALS
HEART RATE: 89 BPM | SYSTOLIC BLOOD PRESSURE: 142 MMHG | RESPIRATION RATE: 18 BRPM | TEMPERATURE: 97.4 F | DIASTOLIC BLOOD PRESSURE: 84 MMHG

## 2023-03-13 DIAGNOSIS — Z89.431 STATUS POST AMPUTATION OF RIGHT FOOT THROUGH METATARSAL BONE: ICD-10-CM

## 2023-03-13 DIAGNOSIS — L08.9 WOUND INFECTION: Primary | ICD-10-CM

## 2023-03-13 DIAGNOSIS — E11.621 DIABETIC ULCER OF LEFT HEEL ASSOCIATED WITH TYPE 2 DIABETES MELLITUS, LIMITED TO BREAKDOWN OF SKIN: ICD-10-CM

## 2023-03-13 DIAGNOSIS — E66.01 CLASS 3 SEVERE OBESITY DUE TO EXCESS CALORIES WITH SERIOUS COMORBIDITY AND BODY MASS INDEX (BMI) OF 45.0 TO 49.9 IN ADULT: ICD-10-CM

## 2023-03-13 DIAGNOSIS — I87.2 VENOUS STASIS DERMATITIS OF BOTH LOWER EXTREMITIES: ICD-10-CM

## 2023-03-13 DIAGNOSIS — T14.8XXA WOUND INFECTION: Primary | ICD-10-CM

## 2023-03-13 DIAGNOSIS — T81.31XD POSTOPERATIVE WOUND DEHISCENCE, SUBSEQUENT ENCOUNTER: ICD-10-CM

## 2023-03-13 DIAGNOSIS — L03.115 CELLULITIS OF RIGHT FOOT: ICD-10-CM

## 2023-03-13 DIAGNOSIS — L97.421 DIABETIC ULCER OF LEFT HEEL ASSOCIATED WITH TYPE 2 DIABETES MELLITUS, LIMITED TO BREAKDOWN OF SKIN: ICD-10-CM

## 2023-03-13 DIAGNOSIS — E11.42 DIABETIC POLYNEUROPATHY ASSOCIATED WITH TYPE 2 DIABETES MELLITUS: ICD-10-CM

## 2023-03-13 PROCEDURE — 11042 DBRDMT SUBQ TIS 1ST 20SQCM/<: CPT | Performed by: EMERGENCY MEDICINE

## 2023-03-13 PROCEDURE — 3077F SYST BP >= 140 MM HG: CPT | Performed by: EMERGENCY MEDICINE

## 2023-03-13 PROCEDURE — 97597 DBRDMT OPN WND 1ST 20 CM/<: CPT | Performed by: EMERGENCY MEDICINE

## 2023-03-13 PROCEDURE — 11045 DBRDMT SUBQ TISS EACH ADDL: CPT | Performed by: EMERGENCY MEDICINE

## 2023-03-13 PROCEDURE — 1159F MED LIST DOCD IN RCRD: CPT | Performed by: EMERGENCY MEDICINE

## 2023-03-13 PROCEDURE — 1160F RVW MEDS BY RX/DR IN RCRD: CPT | Performed by: EMERGENCY MEDICINE

## 2023-03-13 PROCEDURE — 99214 OFFICE O/P EST MOD 30 MIN: CPT | Performed by: EMERGENCY MEDICINE

## 2023-03-13 PROCEDURE — 3079F DIAST BP 80-89 MM HG: CPT | Performed by: EMERGENCY MEDICINE

## 2023-03-13 RX ORDER — AMOXICILLIN AND CLAVULANATE POTASSIUM 875; 125 MG/1; MG/1
1 TABLET, FILM COATED ORAL 2 TIMES DAILY
Qty: 28 TABLET | Refills: 0 | Status: SHIPPED | OUTPATIENT
Start: 2023-03-13 | End: 2023-03-27

## 2023-03-13 RX ORDER — DOXYCYCLINE HYCLATE 100 MG/1
100 CAPSULE ORAL 2 TIMES DAILY
Qty: 28 CAPSULE | Refills: 0 | Status: SHIPPED | OUTPATIENT
Start: 2023-03-13 | End: 2023-03-27

## 2023-03-13 NOTE — PROGRESS NOTES
Chief Complaint  Wound Check (BILATERAL FOOT WOUNDS (BS: 173))    Subjective      Wound Check        Jose Shaikh  is a 62 y.o. diabetic male with a right MTT amputation that is healing by secondary intention.  He also has a wound on the left plantar foot.  He has a difficult social situation due to financial constraints and housing issues.      He was recently admitted to the hospital from 10/01 to 10/05/2022 for cellulitis and osteomyelitis of the right foot.  MRI revealed osteomyelitis of bones of the midfoot.  CTA showed two-vessel runoff to the foot.  Dr. Fuller did not feel that there was any vascular intervention that would help the patient heal better and that he would most likely need to proceed to a right sided BKA.  Patient declined.  He got 4 weeks of doxycycline and Augmentin.  He has previously undergone hyperbaric oxygen therapy and did not want to do so again.    Patient's housing issues have been addressed and resolved and he now has his own apartment.    He is using dry Hydrofiber with silver.  He has home health coming out twice a week which is how often the left foot wound gets bandaged.  Patient has a bad hip and is not able to do that dressing change himself.  He is changing the right foot dressing every other day.  He says he is no longer wrapping his foot with a diaper but he puts it inside the blue surgical style booty that he puts on that foot so that it still catches any drainage but does not trap moisture against his foot.      He developed some redness and discomfort on his right leg but was recently started on Augmentin due to a positive wound culture and says that the leg started improving.  Both feet still periodically have shooting pains.    Allergies:  Adhesive tape      Current Outpatient Medications:   •  acetaminophen (Arthritis Pain) 650 MG 8 hr tablet, Take 650 mg by mouth Every 8 (Eight) Hours As Needed for Mild Pain., Disp: , Rfl:   •  amoxicillin-clavulanate (Augmentin)  875-125 MG per tablet, Take 1 tablet by mouth 2 (Two) Times a Day for 7 days., Disp: 14 tablet, Rfl: 0  •  amoxicillin-clavulanate (Augmentin) 875-125 MG per tablet, Take 1 tablet by mouth 2 (Two) Times a Day for 14 days., Disp: 28 tablet, Rfl: 0  •  apixaban (ELIQUIS) 5 MG tablet tablet, Take 1 tablet by mouth Every 12 (Twelve) Hours., Disp: 180 tablet, Rfl: 1  •  doxycycline (VIBRAMYCIN) 100 MG capsule, Take 1 capsule by mouth 2 (Two) Times a Day for 14 days. Do not take at the same time as any vitamin or mineral supplements., Disp: 28 capsule, Rfl: 0  •  glucose blood test strip, Test blood sugar 3 times a day, Disp: 100 each, Rfl: 5  •  insulin aspart (NovoLOG) 100 UNIT/ML injection, Inject 20 Units under the skin into the appropriate area as directed 3 (Three) Times a Day Before Meals. Take up to 20 units as instructed, Disp: 20 mL, Rfl: 3  •  insulin detemir (Levemir FlexTouch) 100 UNIT/ML injection, Inject 60 Units under the skin into the appropriate area as directed Daily., Disp: 60 mL, Rfl: 1  •  Insulin Lispro (HumaLOG KwikPen) 200 UNIT/ML solution pen-injector, Inject 30 Units under the skin into the appropriate area as directed 3 (Three) Times a Day Before Meals., Disp: 45 mL, Rfl: 1  •  Insulin Pen Needle (Pen Needles) 32G X 5 MM misc, 1 each 4 (Four) Times a Day., Disp: 400 each, Rfl: 1  •  lisinopril (PRINIVIL,ZESTRIL) 20 MG tablet, Take 1 tablet by mouth Daily., Disp: 90 tablet, Rfl: 1  •  metFORMIN (GLUCOPHAGE) 1000 MG tablet, Take 1 tablet by mouth 2 (Two) Times a Day With Meals., Disp: 360 tablet, Rfl: 1  •  metoprolol succinate XL (TOPROL-XL) 50 MG 24 hr tablet, Take 1 tablet by mouth Daily., Disp: 90 tablet, Rfl: 1  •  Pro Comfort Lancets 31G misc, , Disp: , Rfl:   •  simvastatin (ZOCOR) 20 MG tablet, Take 1 tablet by mouth Every Night., Disp: 90 tablet, Rfl: 1    Past Medical History:   Diagnosis Date   • Absence of toe of right foot    • Acute osteomyelitis of left calcaneus  8/18/2021   •  Anxiety and depression    • Arthritis    • Claustrophobia    • Corns and callus    • Diabetic ulcer of left heel associated with type 2 DM 2021   • Diabetic ulcer of left heel associated with type 2 DM 2021   • Diabetic ulcer of right midfoot associated with type 2 DM 2021   • Difficulty walking    • Essential hypertension 2021   • Hammertoe    • Hyperlipidemia LDL goal <100 2021   • Ingrown toenail    • Obesity    • Paroxysmal atrial fibrillation 2021   • Polyneuropathy    • Pressure ulcer, stage 1    • Tinea unguium    • Type 2 diabetes mellitus with polyneuropathy      Past Surgical History:   Procedure Laterality Date   • CYST REMOVAL      center of back; benign   • INCISION AND DRAINAGE ABSCESS      back   • INCISION AND DRAINAGE LEG Right 12/10/2021    Procedure: INCISION AND DRAINAGE LOWER EXTREMITY;  Surgeon: Ash Leyva DPM;  Location: [facility];  Service: Podiatry;  Laterality: Right;   • OTHER SURGICAL HISTORY      Surgical clips left foot   • TOE SURGERY Right     Removal of 5th toe   • TRANS METATARSAL AMPUTATION Right 2021    Procedure: AMPUTATION TRANS METATARSAL;  Surgeon: Ash Leyva DPM;  Location: [facility];  Service: Podiatry;  Laterality: Right;   • WRIST SURGERY Left     repair of injury     Social History     Socioeconomic History   • Marital status: Single   Tobacco Use   • Smoking status: Former     Packs/day: 0.00     Years: 1.00     Pack years: 0.00     Types: Cigarettes     Quit date: 1991     Years since quittin.5   • Smokeless tobacco: Never   • Tobacco comments:     quit at age 30   Vaping Use   • Vaping Use: Never used   Substance and Sexual Activity   • Alcohol use: Yes     Comment: Rare   • Drug use: Yes     Types: Marijuana     Comment: Daily   • Sexual activity: Defer           Objective     Vitals:    23 1040   BP: 142/84   BP Location: Left arm   Patient Position: Sitting   Pulse: 89   Resp: 18    Temp: 97.4 °F (36.3 °C)   TempSrc: Temporal   PainSc: 0-No pain     There is no height or weight on file to calculate BMI.     STEADI Fall Risk Assessment has not been completed.     Review of Systems     ROS:  No fevers, chills, sweats, or body aches.     I have reviewed the HPI and ROS as documented by MA/RN. Shea Daniels MD    Physical Exam     NAD  Normocephalic, atraumatic  EOMI, no scleral icterus  No respiratory distress, no cough  AAOx3, pleasant, cooperative  LLE: Left plantar wound is slightly smaller and has less depth.  There is decreased maceration, callus formation, and hemosiderin deposition.  Patient continues to have tenderness along the lateral aspect of the left foot adjacent to the wound.  RLE: Right foot wound is measuring larger again but the tissues appear somewhat healthier.  There is decreased amount of maceration which was quite severe at his last visit.  There is some erythema and swelling of the periwound tissues particularly along the proximal lateral and plantar margins.  Stasis changes anterior and medial distal RLE with no open wounds.  Mild TTP along the medial aspect.    See photos for details.    RLE:              LLE:              Result Review :  The following data was reviewed by: Shea Daniels MD on 03/13/2023:    Prior notes and images.          Wound debridement  Performed by: Shea Daniels MD  Authorized by: Shea Daniels MD     Correct patient: Identification verified by two methods:     Verbally and Date of birth  Correct procedure/consent    Correct site/side    Correct equipment as applicable    How many wounds are you performing a debridement on?:  2  Wound 1:     Nursing Documentation:     Wound Location:  Right foot  Measurements:     The total amount debrided on this wound was over 20 cm2.     Provider Documentation    Debridement type:  Sharp/excisional    Betadine      Other:  Sterile pickups and 10 blade     None    Tissue debrided:  Moderate    Level  removed:  Subcutaneous    Patient tolerance:  Good    Hemostasis achieved by:  Direct pressure    Wound Bed Post Debridement: See Photo      Description:  34.77 cm²  Wound 2:     Wound Location:  Left foot   Measurements:    The total amount debrided on this wound was under 20 cm2.      Provider Documentation:     Debridement type:  Sharp/Excisional    Betadine      Other:  Sterile pickups and 10 blade     None    Tissue debrided:  Moderate    Level removed:  Skin    Patient tolerance:  Good    Hemostasis achieved by:  Direct Pressure    Wound Bed Post Debridement: See Photo                       Assessment and Plan   Diagnoses and all orders for this visit:    1. Wound infection (Primary)  -     amoxicillin-clavulanate (Augmentin) 875-125 MG per tablet; Take 1 tablet by mouth 2 (Two) Times a Day for 14 days.  Dispense: 28 tablet; Refill: 0  -     doxycycline (VIBRAMYCIN) 100 MG capsule; Take 1 capsule by mouth 2 (Two) Times a Day for 14 days. Do not take at the same time as any vitamin or mineral supplements.  Dispense: 28 capsule; Refill: 0    2. Postoperative wound dehiscence, subsequent encounter    3. 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 Diabetic ulcer of left heel associated with type 2 diabetes mellitus, limited to breakdown of skin ([facility])    5. Cellulitis of right foot    6. Class 3 severe obesity due to excess calories with serious comorbidity and body mass index (BMI) of 45.0 to 49.9 in adult ([facility])    7. Diabetic polyneuropathy associated with type 2 diabetes mellitus ([facility])    8. Venous stasis dermatitis of both lower extremities    Other orders  -     Wound debridement        Decreased maceration and slight improvement in tissue health.  We will have them continue to apply Betadine copiously around the edges of the wound and the macerated tissues and then cover the entire area with dry Aquacel Ag.  Aquacel Ag or similar Hydrofiber with silver should be packed into the  full depth of the left foot wound.  Reinforce thoroughly with gauze and ABD pads and gently wrap in place with Kerlix and Ace.      Patient should be offloading both wounds as much as possible.    Work on improving diabetic control and blood sugar levels.      Patient with evidence of cellulitis and wound infection of the right foot.  I have prescribed 2 additional weeks of Augmentin and 2 weeks of doxycycline.  We will see him back and decide if he needs further antibiotics.  I had a long talk with him and him strongly to proceed to the emergency department immediately should the symptoms worsen or not start improving within 24 to 48 hours of starting the additional antibiotics.    He is also advised to keep wrapping and elevating his legs, particularly the RLE.    Recheck 2 weeks.    Patient was given instructions and counseling regarding their condition or for health maintenance advice, as well as the wound care plan and recommendations. They understand and agree with the plan.  They will follow back up here in the clinic but are instructed to contact us in the interim should they have any new, returning, or worsening symptoms or concerns. Please see specific information pulled into the AVS if appropriate.     Dragon Dictation utilized for chart completion.      Follow Up   Return in about 2 weeks (around 3/27/2023).      Shea Daniels MD

## 2023-03-15 ENCOUNTER — TELEPHONE (OUTPATIENT)
Dept: DIABETES SERVICES | Facility: HOSPITAL | Age: 63
End: 2023-03-15
Payer: MEDICARE

## 2023-03-17 ENCOUNTER — OFFICE VISIT (OUTPATIENT)
Dept: ORTHOPEDIC SURGERY | Facility: CLINIC | Age: 63
End: 2023-03-17
Payer: MEDICARE

## 2023-03-17 VITALS — OXYGEN SATURATION: 97 % | HEIGHT: 74 IN | HEART RATE: 101 BPM | WEIGHT: 315 LBS | BODY MASS INDEX: 40.43 KG/M2

## 2023-03-17 DIAGNOSIS — M65.341 TRIGGER RING FINGER OF RIGHT HAND: Primary | ICD-10-CM

## 2023-03-17 DIAGNOSIS — M16.12 PRIMARY OSTEOARTHRITIS OF LEFT HIP: ICD-10-CM

## 2023-03-17 DIAGNOSIS — M17.12 PRIMARY OSTEOARTHRITIS OF LEFT KNEE: ICD-10-CM

## 2023-03-17 PROCEDURE — 99203 OFFICE O/P NEW LOW 30 MIN: CPT | Performed by: ORTHOPAEDIC SURGERY

## 2023-03-17 PROCEDURE — 20610 DRAIN/INJ JOINT/BURSA W/O US: CPT | Performed by: ORTHOPAEDIC SURGERY

## 2023-03-17 RX ORDER — METHYLPREDNISOLONE ACETATE 80 MG/ML
80 INJECTION, SUSPENSION INTRA-ARTICULAR; INTRALESIONAL; INTRAMUSCULAR; SOFT TISSUE
Status: COMPLETED | OUTPATIENT
Start: 2023-03-17 | End: 2023-03-17

## 2023-03-17 RX ORDER — MELOXICAM 7.5 MG/1
TABLET ORAL
COMMUNITY
Start: 2023-01-27 | End: 2023-03-17 | Stop reason: SDUPTHER

## 2023-03-17 RX ORDER — MELOXICAM 7.5 MG/1
15 TABLET ORAL DAILY
Qty: 90 TABLET | Refills: 1 | Status: SHIPPED | OUTPATIENT
Start: 2023-03-17

## 2023-03-17 RX ORDER — LIDOCAINE HYDROCHLORIDE 10 MG/ML
5 INJECTION, SOLUTION EPIDURAL; INFILTRATION; INTRACAUDAL; PERINEURAL
Status: COMPLETED | OUTPATIENT
Start: 2023-03-17 | End: 2023-03-17

## 2023-03-17 RX ADMIN — LIDOCAINE HYDROCHLORIDE 5 ML: 10 INJECTION, SOLUTION EPIDURAL; INFILTRATION; INTRACAUDAL; PERINEURAL at 12:36

## 2023-03-17 RX ADMIN — METHYLPREDNISOLONE ACETATE 80 MG: 80 INJECTION, SUSPENSION INTRA-ARTICULAR; INTRALESIONAL; INTRAMUSCULAR; SOFT TISSUE at 12:36

## 2023-03-17 NOTE — PROGRESS NOTES
"Chief Complaint  Initial Evaluation and Pain of the Left Hip and Initial Evaluation and Pain of the Left Knee     Subjective      Jose Shaikh presents to [facility] for evaluation of the left lower extremity. The patient reports left hip and left knee pain. He also reports a right ring finger trigger finger. He had x-ray at the ER of his knee and his hip. He reports pain with weightbearing. He has pain to the lateral hip. He had a right toe amputation due to diabetic.     Allergies   Allergen Reactions   • Adhesive Tape Rash        Social History     Socioeconomic History   • Marital status: Single   Tobacco Use   • Smoking status: Former     Packs/day: 0.00     Years: 1.00     Pack years: 0.00     Types: Cigarettes     Quit date: 1991     Years since quittin.5   • Smokeless tobacco: Never   • Tobacco comments:     quit at age 30   Vaping Use   • Vaping Use: Never used   Substance and Sexual Activity   • Alcohol use: Yes     Comment: Rare   • Drug use: Yes     Types: Marijuana     Comment: Daily   • Sexual activity: Defer        Review of Systems     Objective   Vital Signs:   Pulse 101   Ht 188 cm (74\")   Wt (!) 168 kg (370 lb)   SpO2 97%   BMI 47.51 kg/m²       Physical Exam  Constitutional:       Appearance: Normal appearance. Patient is well-developed and normal weight.   HENT:      Head: Normocephalic.      Right Ear: Hearing and external ear normal.      Left Ear: Hearing and external ear normal.      Nose: Nose normal.   Eyes:      Conjunctiva/sclera: Conjunctivae normal.   Cardiovascular:      Rate and Rhythm: Normal rate.   Pulmonary:      Effort: Pulmonary effort is normal.      Breath sounds: No wheezing or rales.   Abdominal:      Palpations: Abdomen is soft.      Tenderness: There is no abdominal tenderness.   Musculoskeletal:      Cervical back: Normal range of motion.   Skin:     Findings: No rash.   Neurological:      Mental Status: Patient  is alert and " oriented to person, place, and time.   Psychiatric:         Mood and Affect: Mood and affect normal.         Judgment: Judgment normal.       Ortho Exam      Left lower extremity- tender to the lateral and anterior hip. Pain with hip ROM. Flexion 80. External Rotation 30. Positive EHL, FHL, GS and TA. Sensation intact to all 5 nerves of the foot. Positive pulses. Neurovascularly intact. Knee ROM -5 to 110 degrees. Stable to varus/valgus stress. Stable to anterior/posterior drawer. Positive crepitus.     Right hand- triggering and locking of the right ring finger. Tender to the A-1 pulley. Neurovascularly intact. Sensation to light touch median, radial, ulnar nerve. Positive AIN, PIN, ulnar nerve motor function. Positive pulses. Good capillary refill      Large Joint LEFT KNEE: L knee  Date/Time: 3/17/2023 12:36 PM  Consent given by: patient  Site marked: site marked  Timeout: Immediately prior to procedure a time out was called to verify the correct patient, procedure, equipment, support staff and site/side marked as required   Supporting Documentation  Indications: pain   Procedure Details  Location: knee - L knee  Preparation: Patient was prepped and draped in the usual sterile fashion  Needle gauge: 21G.  Medications administered: 5 mL lidocaine PF 1% 1 %; 80 mg methylPREDNISolone acetate 80 MG/ML  Patient tolerance: patient tolerated the procedure well with no immediate complications              Imaging Results (Most Recent)     None           Result Review :       No results found.           Assessment and Plan     Diagnoses and all orders for this visit:    1. Trigger ring finger of right hand (Primary)    2. Primary osteoarthritis of left knee    3. Primary osteoarthritis of left hip        Discussed the treatment plan with the patient.  I reviewed the x-rays that were previously obtained. Discussed the risks and benefits of a left knee steroid injection. The patient expressed understanding and wished to  proceed. He tolerated the injection well. May consider intra-articular hip injection and trigger finger injection in the future.     Educated on risk of elevated BMI.  Discussed options for weight loss/decreasing BMI prior to procedure including dietician consult, weight loss options and exercise program. and Call or return if worsening symptoms.    Follow Up     1 week      Patient was given instructions and counseling regarding his condition or for health maintenance advice. Please see specific information pulled into the AVS if appropriate.     Scribed for Max Parkinson MD by Beverley Schaefer.  03/17/23   12:07 EDT      I have personally performed the services described in this document as scribed by the above individual and it is both accurate and complete. Max Parkinson MD 03/17/23

## 2023-03-27 ENCOUNTER — HOSPITAL ENCOUNTER (EMERGENCY)
Facility: HOSPITAL | Age: 63
Discharge: HOME | End: 2023-03-28
Attending: EMERGENCY MEDICINE | Admitting: EMERGENCY MEDICINE
Payer: MEDICARE

## 2023-03-27 ENCOUNTER — HOSPITAL ENCOUNTER (EMERGENCY)
Facility: HOSPITAL | Age: 63
Discharge: HOME | End: 2023-03-27
Attending: EMERGENCY MEDICINE | Admitting: EMERGENCY MEDICINE
Payer: MEDICARE

## 2023-03-27 ENCOUNTER — APPOINTMENT (OUTPATIENT)
Dept: GENERAL RADIOLOGY | Facility: HOSPITAL | Age: 63
End: 2023-03-27
Payer: MEDICARE

## 2023-03-27 ENCOUNTER — APPOINTMENT (OUTPATIENT)
Dept: CARDIOLOGY | Facility: HOSPITAL | Age: 63
End: 2023-03-27
Payer: MEDICARE

## 2023-03-27 VITALS
BODY MASS INDEX: 41.75 KG/M2 | WEIGHT: 315 LBS | SYSTOLIC BLOOD PRESSURE: 138 MMHG | HEIGHT: 73 IN | OXYGEN SATURATION: 97 % | DIASTOLIC BLOOD PRESSURE: 65 MMHG | RESPIRATION RATE: 21 BRPM | TEMPERATURE: 99.1 F | HEART RATE: 87 BPM

## 2023-03-27 DIAGNOSIS — M79.605 PAIN OF LEFT LOWER EXTREMITY: Primary | ICD-10-CM

## 2023-03-27 DIAGNOSIS — B34.9 VIRAL SYNDROME: Primary | ICD-10-CM

## 2023-03-27 LAB
ALBUMIN SERPL-MCNC: 2.8 G/DL (ref 3.5–5.2)
ALBUMIN SERPL-MCNC: 3.4 G/DL (ref 3.5–5.2)
ALBUMIN/GLOB SERPL: 0.8 G/DL
ALBUMIN/GLOB SERPL: 1.1 G/DL
ALP SERPL-CCNC: 166 U/L (ref 39–117)
ALP SERPL-CCNC: 211 U/L (ref 39–117)
ALT SERPL W P-5'-P-CCNC: 31 U/L (ref 1–41)
ALT SERPL W P-5'-P-CCNC: 41 U/L (ref 1–41)
ANION GAP SERPL CALCULATED.3IONS-SCNC: 10.2 MMOL/L (ref 5–15)
ANION GAP SERPL CALCULATED.3IONS-SCNC: 8.8 MMOL/L (ref 5–15)
ANISOCYTOSIS BLD QL: NORMAL
AST SERPL-CCNC: 39 U/L (ref 1–40)
AST SERPL-CCNC: 52 U/L (ref 1–40)
BASOPHILS # BLD AUTO: 0.03 10*3/MM3 (ref 0–0.2)
BASOPHILS # BLD AUTO: 0.03 10*3/MM3 (ref 0–0.2)
BASOPHILS NFR BLD AUTO: 0.5 % (ref 0–1.5)
BASOPHILS NFR BLD AUTO: 0.5 % (ref 0–1.5)
BH CV LOWER VASCULAR LEFT COMMON FEMORAL AUGMENT: NORMAL
BH CV LOWER VASCULAR LEFT COMMON FEMORAL COMPETENT: NORMAL
BH CV LOWER VASCULAR LEFT COMMON FEMORAL COMPRESS: NORMAL
BH CV LOWER VASCULAR LEFT COMMON FEMORAL PHASIC: NORMAL
BH CV LOWER VASCULAR LEFT COMMON FEMORAL SPONT: NORMAL
BH CV LOWER VASCULAR LEFT MID FEMORAL COMPETENT: NORMAL
BH CV LOWER VASCULAR LEFT MID FEMORAL PHASIC: NORMAL
BH CV LOWER VASCULAR LEFT MID FEMORAL SPONT: NORMAL
BH CV LOWER VASCULAR LEFT POPLITEAL AUGMENT: NORMAL
BH CV LOWER VASCULAR LEFT POPLITEAL COMPETENT: NORMAL
BH CV LOWER VASCULAR LEFT POPLITEAL COMPRESS: NORMAL
BH CV LOWER VASCULAR LEFT POPLITEAL PHASIC: NORMAL
BH CV LOWER VASCULAR LEFT POPLITEAL SPONT: NORMAL
BH CV LOWER VASCULAR LEFT POSTERIOR TIBIAL COMPRESS: NORMAL
BH CV LOWER VASCULAR RIGHT COMMON FEMORAL AUGMENT: NORMAL
BH CV LOWER VASCULAR RIGHT COMMON FEMORAL COMPETENT: NORMAL
BH CV LOWER VASCULAR RIGHT COMMON FEMORAL COMPRESS: NORMAL
BH CV LOWER VASCULAR RIGHT COMMON FEMORAL PHASIC: NORMAL
BH CV LOWER VASCULAR RIGHT COMMON FEMORAL SPONT: NORMAL
BH CV VAS PRELIMINARY FINDINGS SCRIPTING: 1
BILIRUB SERPL-MCNC: 1.2 MG/DL (ref 0–1.2)
BILIRUB SERPL-MCNC: 1.6 MG/DL (ref 0–1.2)
BILIRUB UR QL STRIP: NEGATIVE
BUN SERPL-MCNC: 13 MG/DL (ref 8–23)
BUN SERPL-MCNC: 13 MG/DL (ref 8–23)
BUN/CREAT SERPL: 19.1 (ref 7–25)
BUN/CREAT SERPL: 26.5 (ref 7–25)
CALCIUM SPEC-SCNC: 7.1 MG/DL (ref 8.6–10.5)
CALCIUM SPEC-SCNC: 9 MG/DL (ref 8.6–10.5)
CHLORIDE SERPL-SCNC: 107 MMOL/L (ref 98–107)
CHLORIDE SERPL-SCNC: 97 MMOL/L (ref 98–107)
CLARITY UR: CLEAR
CO2 SERPL-SCNC: 22.2 MMOL/L (ref 22–29)
CO2 SERPL-SCNC: 24.8 MMOL/L (ref 22–29)
COLOR UR: ABNORMAL
CREAT SERPL-MCNC: 0.49 MG/DL (ref 0.76–1.27)
CREAT SERPL-MCNC: 0.68 MG/DL (ref 0.76–1.27)
D-LACTATE SERPL-SCNC: 1.4 MMOL/L (ref 0.5–2)
D-LACTATE SERPL-SCNC: 2.1 MMOL/L (ref 0.5–2)
DEPRECATED RDW RBC AUTO: 52.4 FL (ref 37–54)
DEPRECATED RDW RBC AUTO: 54.7 FL (ref 37–54)
EGFRCR SERPLBLD CKD-EPI 2021: 103.8 ML/MIN/1.73
EGFRCR SERPLBLD CKD-EPI 2021: 114.6 ML/MIN/1.73
EOSINOPHIL # BLD AUTO: 0 10*3/MM3 (ref 0–0.4)
EOSINOPHIL # BLD AUTO: 0.02 10*3/MM3 (ref 0–0.4)
EOSINOPHIL NFR BLD AUTO: 0 % (ref 0.3–6.2)
EOSINOPHIL NFR BLD AUTO: 0.3 % (ref 0.3–6.2)
ERYTHROCYTE [DISTWIDTH] IN BLOOD BY AUTOMATED COUNT: 18.5 % (ref 12.3–15.4)
ERYTHROCYTE [DISTWIDTH] IN BLOOD BY AUTOMATED COUNT: 19.3 % (ref 12.3–15.4)
FLUAV AG NPH QL: NEGATIVE
FLUBV AG NPH QL IA: NEGATIVE
GEN 5 2HR TROPONIN T REFLEX: 9 NG/L
GLOBULIN UR ELPH-MCNC: 2.6 GM/DL
GLOBULIN UR ELPH-MCNC: 4.3 GM/DL
GLUCOSE SERPL-MCNC: 174 MG/DL (ref 65–99)
GLUCOSE SERPL-MCNC: 189 MG/DL (ref 65–99)
GLUCOSE UR STRIP-MCNC: ABNORMAL MG/DL
HCT VFR BLD AUTO: 35.9 % (ref 37.5–51)
HCT VFR BLD AUTO: 37.3 % (ref 37.5–51)
HGB BLD-MCNC: 11.4 G/DL (ref 13–17.7)
HGB BLD-MCNC: 12.1 G/DL (ref 13–17.7)
HGB UR QL STRIP.AUTO: NEGATIVE
HOLD SPECIMEN: NORMAL
IMM GRANULOCYTES # BLD AUTO: 0.01 10*3/MM3 (ref 0–0.05)
IMM GRANULOCYTES # BLD AUTO: 0.01 10*3/MM3 (ref 0–0.05)
IMM GRANULOCYTES NFR BLD AUTO: 0.2 % (ref 0–0.5)
IMM GRANULOCYTES NFR BLD AUTO: 0.2 % (ref 0–0.5)
KETONES UR QL STRIP: ABNORMAL
LARGE PLATELETS: NORMAL
LEUKOCYTE ESTERASE UR QL STRIP.AUTO: NEGATIVE
LYMPHOCYTES # BLD AUTO: 0.24 10*3/MM3 (ref 0.7–3.1)
LYMPHOCYTES # BLD AUTO: 0.42 10*3/MM3 (ref 0.7–3.1)
LYMPHOCYTES NFR BLD AUTO: 3.9 % (ref 19.6–45.3)
LYMPHOCYTES NFR BLD AUTO: 7.2 % (ref 19.6–45.3)
MAGNESIUM SERPL-MCNC: 1.5 MG/DL (ref 1.6–2.4)
MAXIMAL PREDICTED HEART RATE: 156 BPM
MCH RBC QN AUTO: 25.7 PG (ref 26.6–33)
MCH RBC QN AUTO: 25.7 PG (ref 26.6–33)
MCHC RBC AUTO-ENTMCNC: 31.8 G/DL (ref 31.5–35.7)
MCHC RBC AUTO-ENTMCNC: 32.4 G/DL (ref 31.5–35.7)
MCV RBC AUTO: 79.2 FL (ref 79–97)
MCV RBC AUTO: 80.9 FL (ref 79–97)
MONOCYTES # BLD AUTO: 0.24 10*3/MM3 (ref 0.1–0.9)
MONOCYTES # BLD AUTO: 0.39 10*3/MM3 (ref 0.1–0.9)
MONOCYTES NFR BLD AUTO: 3.9 % (ref 5–12)
MONOCYTES NFR BLD AUTO: 6.7 % (ref 5–12)
NEUTROPHILS NFR BLD AUTO: 4.95 10*3/MM3 (ref 1.7–7)
NEUTROPHILS NFR BLD AUTO: 5.54 10*3/MM3 (ref 1.7–7)
NEUTROPHILS NFR BLD AUTO: 85.4 % (ref 42.7–76)
NEUTROPHILS NFR BLD AUTO: 91.2 % (ref 42.7–76)
NITRITE UR QL STRIP: NEGATIVE
NRBC BLD AUTO-RTO: 0 /100 WBC (ref 0–0.2)
NRBC BLD AUTO-RTO: 0 /100 WBC (ref 0–0.2)
PH UR STRIP.AUTO: 6 [PH] (ref 5–8)
PLATELET # BLD AUTO: 110 10*3/MM3 (ref 140–450)
PLATELET # BLD AUTO: 96 10*3/MM3 (ref 140–450)
PMV BLD AUTO: 10.9 FL (ref 6–12)
PMV BLD AUTO: 11.5 FL (ref 6–12)
POTASSIUM SERPL-SCNC: 3.5 MMOL/L (ref 3.5–5.2)
POTASSIUM SERPL-SCNC: 4.4 MMOL/L (ref 3.5–5.2)
PROT SERPL-MCNC: 5.4 G/DL (ref 6–8.5)
PROT SERPL-MCNC: 7.7 G/DL (ref 6–8.5)
PROT UR QL STRIP: NEGATIVE
RBC # BLD AUTO: 4.44 10*6/MM3 (ref 4.14–5.8)
RBC # BLD AUTO: 4.71 10*6/MM3 (ref 4.14–5.8)
SMALL PLATELETS BLD QL SMEAR: NORMAL
SODIUM SERPL-SCNC: 132 MMOL/L (ref 136–145)
SODIUM SERPL-SCNC: 138 MMOL/L (ref 136–145)
SP GR UR STRIP: 1.02 (ref 1–1.03)
STRESS TARGET HR: 133 BPM
TROPONIN T DELTA: 1 NG/L
TROPONIN T SERPL HS-MCNC: 12 NG/L
TROPONIN T SERPL HS-MCNC: 8 NG/L
UROBILINOGEN UR QL STRIP: ABNORMAL
WBC MORPH BLD: NORMAL
WBC NRBC COR # BLD: 5.8 10*3/MM3 (ref 3.4–10.8)
WBC NRBC COR # BLD: 6.08 10*3/MM3 (ref 3.4–10.8)
WHOLE BLOOD HOLD COAG: NORMAL
WHOLE BLOOD HOLD COAG: NORMAL
WHOLE BLOOD HOLD SPECIMEN: NORMAL
WHOLE BLOOD HOLD SPECIMEN: NORMAL

## 2023-03-27 PROCEDURE — 87804 INFLUENZA ASSAY W/OPTIC: CPT | Performed by: EMERGENCY MEDICINE

## 2023-03-27 PROCEDURE — 87040 BLOOD CULTURE FOR BACTERIA: CPT | Performed by: EMERGENCY MEDICINE

## 2023-03-27 PROCEDURE — 85025 COMPLETE CBC W/AUTO DIFF WBC: CPT

## 2023-03-27 PROCEDURE — 84484 ASSAY OF TROPONIN QUANT: CPT | Performed by: EMERGENCY MEDICINE

## 2023-03-27 PROCEDURE — 99284 EMERGENCY DEPT VISIT MOD MDM: CPT

## 2023-03-27 PROCEDURE — 36415 COLL VENOUS BLD VENIPUNCTURE: CPT

## 2023-03-27 PROCEDURE — 93971 EXTREMITY STUDY: CPT

## 2023-03-27 PROCEDURE — 80053 COMPREHEN METABOLIC PANEL: CPT | Performed by: EMERGENCY MEDICINE

## 2023-03-27 PROCEDURE — 80053 COMPREHEN METABOLIC PANEL: CPT

## 2023-03-27 PROCEDURE — 81003 URINALYSIS AUTO W/O SCOPE: CPT

## 2023-03-27 PROCEDURE — 83605 ASSAY OF LACTIC ACID: CPT | Performed by: EMERGENCY MEDICINE

## 2023-03-27 PROCEDURE — 25010000002 ONDANSETRON PER 1 MG: Performed by: REGISTERED NURSE

## 2023-03-27 PROCEDURE — 71045 X-RAY EXAM CHEST 1 VIEW: CPT

## 2023-03-27 PROCEDURE — 84484 ASSAY OF TROPONIN QUANT: CPT

## 2023-03-27 PROCEDURE — 85025 COMPLETE CBC W/AUTO DIFF WBC: CPT | Performed by: EMERGENCY MEDICINE

## 2023-03-27 PROCEDURE — 85007 BL SMEAR W/DIFF WBC COUNT: CPT | Performed by: EMERGENCY MEDICINE

## 2023-03-27 PROCEDURE — 96374 THER/PROPH/DIAG INJ IV PUSH: CPT

## 2023-03-27 PROCEDURE — 93010 ELECTROCARDIOGRAM REPORT: CPT | Performed by: INTERNAL MEDICINE

## 2023-03-27 PROCEDURE — U0004 COV-19 TEST NON-CDC HGH THRU: HCPCS | Performed by: EMERGENCY MEDICINE

## 2023-03-27 PROCEDURE — 93005 ELECTROCARDIOGRAM TRACING: CPT | Performed by: EMERGENCY MEDICINE

## 2023-03-27 PROCEDURE — 93005 ELECTROCARDIOGRAM TRACING: CPT

## 2023-03-27 PROCEDURE — 93971 EXTREMITY STUDY: CPT | Performed by: SURGERY

## 2023-03-27 PROCEDURE — 83735 ASSAY OF MAGNESIUM: CPT

## 2023-03-27 RX ORDER — SODIUM CHLORIDE 0.9 % (FLUSH) 0.9 %
10 SYRINGE (ML) INJECTION AS NEEDED
Status: DISCONTINUED | OUTPATIENT
Start: 2023-03-27 | End: 2023-03-27 | Stop reason: HOSPADM

## 2023-03-27 RX ORDER — ACETAMINOPHEN 325 MG/1
650 TABLET ORAL ONCE
Status: COMPLETED | OUTPATIENT
Start: 2023-03-27 | End: 2023-03-27

## 2023-03-27 RX ORDER — SODIUM CHLORIDE 0.9 % (FLUSH) 0.9 %
10 SYRINGE (ML) INJECTION AS NEEDED
Status: DISCONTINUED | OUTPATIENT
Start: 2023-03-27 | End: 2023-03-28 | Stop reason: HOSPADM

## 2023-03-27 RX ORDER — ONDANSETRON 2 MG/ML
4 INJECTION INTRAMUSCULAR; INTRAVENOUS ONCE
Status: COMPLETED | OUTPATIENT
Start: 2023-03-27 | End: 2023-03-27

## 2023-03-27 RX ADMIN — SODIUM CHLORIDE 500 ML: 9 INJECTION, SOLUTION INTRAVENOUS at 22:45

## 2023-03-27 RX ADMIN — ACETAMINOPHEN 650 MG: 325 TABLET ORAL at 22:46

## 2023-03-27 RX ADMIN — ONDANSETRON 4 MG: 2 INJECTION INTRAMUSCULAR; INTRAVENOUS at 22:46

## 2023-03-27 NOTE — ED PROVIDER NOTES
Time: 10:33 AM EDT  Date of encounter:  3/27/2023  Independent Historian/Clinical History and Information was obtained by:   Patient  Chief Complaint: leg swelling    History is limited by: N/A    History of Present Illness:  Patient is a 62 y.o. year old male who presents to the emergency department from wound care center across the street for evaluation of LLE bilateral leg swelling and ulcer on bottom of left foot. Patient has had increasing bruising and swelling in last two weeks. Patient is on Eliquis.  Patient states he had knee injections which caused the bruising and tenderness to left knee.      History provided by:  Patient   used: No        Patient Care Team  Primary Care Provider: Delia Cervantes APRN    Past Medical History:     Allergies   Allergen Reactions   • Adhesive Tape Rash     Past Medical History:   Diagnosis Date   • Absence of toe of right foot    • Acute osteomyelitis of left calcaneus  8/18/2021   • Anxiety and depression    • Arthritis    • Claustrophobia    • Corns and callus    • Diabetic ulcer of left heel associated with type 2 DM 8/18/2021   • Diabetic ulcer of left heel associated with type 2 DM 7/6/2021   • Diabetic ulcer of right midfoot associated with type 2 DM 8/18/2021   • Difficulty walking    • Essential hypertension 8/31/2021   • Hammertoe    • Hyperlipidemia LDL goal <100 8/31/2021   • Ingrown toenail    • Obesity    • Paroxysmal atrial fibrillation 8/31/2021   • Polyneuropathy    • Pressure ulcer, stage 1    • Tinea unguium    • Type 2 diabetes mellitus with polyneuropathy      Past Surgical History:   Procedure Laterality Date   • CYST REMOVAL      center of back; benign   • INCISION AND DRAINAGE ABSCESS      back   • INCISION AND DRAINAGE LEG Right 12/10/2021    Procedure: INCISION AND DRAINAGE LOWER EXTREMITY;  Surgeon: Ash Leyva DPM;  Location: [facility] MAIN OR;  Service: Podiatry;  Laterality: Right;   • OTHER SURGICAL HISTORY       Surgical clips left foot   • TOE SURGERY Right     Removal of 5th toe   • TRANS METATARSAL AMPUTATION Right 12/2/2021    Procedure: AMPUTATION TRANS METATARSAL;  Surgeon: Ash Leyva DPM;  Location: [facility] MAIN OR;  Service: Podiatry;  Laterality: Right;   • WRIST SURGERY Left     repair of injury     Family History   Problem Relation Age of Onset   • Heart disease Mother    • Heart disease Father    • Cancer Father         Unspecified   • Heart disease Brother        Home Medications:  Prior to Admission medications    Medication Sig Start Date End Date Taking? Authorizing Provider   acetaminophen (TYLENOL) 650 MG 8 hr tablet Take 1 tablet by mouth Every 8 (Eight) Hours As Needed for Mild Pain.    Laura Connor MD   amoxicillin-clavulanate (Augmentin) 875-125 MG per tablet Take 1 tablet by mouth 2 (Two) Times a Day for 14 days. 3/13/23 3/27/23  Shea Daniels MD   apixaban (ELIQUIS) 5 MG tablet tablet Take 1 tablet by mouth Every 12 (Twelve) Hours. 1/31/23   Delia Cervantes APRN   Diclofenac Sodium (VOLTAREN) 1 % gel gel  3/6/23   ProviderLaura MD   doxycycline (VIBRAMYCIN) 100 MG capsule Take 1 capsule by mouth 2 (Two) Times a Day for 14 days. Do not take at the same time as any vitamin or mineral supplements. 3/13/23 3/27/23  Shea Daniels MD   glucose blood test strip Test blood sugar 3 times a day 3/15/23   Kami Garcia APRN   insulin aspart (NovoLOG) 100 UNIT/ML injection Inject 20 Units under the skin into the appropriate area as directed 3 (Three) Times a Day Before Meals. Take up to 20 units as instructed 9/29/21 1/27/23  Kami Garcia APRN   insulin detemir (Levemir FlexTouch) 100 UNIT/ML injection Inject 60 Units under the skin into the appropriate area as directed Daily. 1/27/23   Delia Cervantes APRN   Insulin Lispro (HumaLOG KwikPen) 200 UNIT/ML solution pen-injector Inject 30 Units under the skin into the appropriate area as directed 3 (Three) Times a  Day Before Meals. 22   Kami Garcia APRN   Insulin Pen Needle (Pen Needles) 32G X 5 MM misc 1 each 4 (Four) Times a Day. 22   Kami Garcia APRN   lisinopril (PRINIVIL,ZESTRIL) 20 MG tablet Take 1 tablet by mouth Daily. 23   Delia Cervantes APRN   meloxicam (MOBIC) 7.5 MG tablet Take 2 tablets by mouth Daily. 3/17/23   Max Parkinson MD   metFORMIN (GLUCOPHAGE) 1000 MG tablet Take 1 tablet by mouth 2 (Two) Times a Day With Meals. 23   Delia Cervantes APRN   metoprolol succinate XL (TOPROL-XL) 50 MG 24 hr tablet Take 1 tablet by mouth Daily. 23   Delia Cervantes APRN   Pro Comfort Lancets 31G misc  22   Laura Connor MD   simvastatin (ZOCOR) 20 MG tablet Take 1 tablet by mouth Every Night. 23   Delia Cervantes APRN   Trulicity 1.5 MG/0.5ML solution pen-injector  3/6/23   ProviderLaura MD        Social History:   Social History     Tobacco Use   • Smoking status: Former     Packs/day: 0.00     Years: 1.00     Pack years: 0.00     Types: Cigarettes     Quit date: 1991     Years since quittin.5   • Smokeless tobacco: Never   • Tobacco comments:     quit at age 30   Vaping Use   • Vaping Use: Never used   Substance Use Topics   • Alcohol use: Yes     Comment: Rare   • Drug use: Yes     Types: Marijuana     Comment: Daily         Review of Systems:  Review of Systems   Constitutional: Negative for chills and fever.   HENT: Negative for congestion, rhinorrhea and sore throat.    Eyes: Negative for photophobia.   Respiratory: Negative for apnea, cough, chest tightness and shortness of breath.    Cardiovascular: Positive for leg swelling (bilateral). Negative for chest pain and palpitations.   Gastrointestinal: Negative for abdominal pain, diarrhea, nausea and vomiting.   Endocrine: Negative.    Genitourinary: Negative for difficulty urinating and dysuria.   Musculoskeletal: Negative for back pain, joint swelling and myalgias.   Skin: Positive  "for wound (ulcer bottom of left foot). Negative for color change.   Allergic/Immunologic: Negative.    Neurological: Negative for seizures and headaches.   Psychiatric/Behavioral: Negative.    All other systems reviewed and are negative.       Physical Exam:  /65 (BP Location: Left arm, Patient Position: Lying)   Pulse 88   Temp 99.1 °F (37.3 °C) (Oral)   Resp 21   Ht 185.4 cm (73\")   Wt (!) 170 kg (373 lb 10.9 oz)   SpO2 95%   BMI 49.30 kg/m²     Physical Exam  Vitals and nursing note reviewed.   Constitutional:       General: He is awake.      Appearance: Normal appearance.   HENT:      Head: Normocephalic and atraumatic.      Nose: Nose normal.      Mouth/Throat:      Mouth: Mucous membranes are moist.   Eyes:      Extraocular Movements: Extraocular movements intact.      Pupils: Pupils are equal, round, and reactive to light.   Cardiovascular:      Rate and Rhythm: Normal rate and regular rhythm.      Heart sounds: Normal heart sounds.   Pulmonary:      Effort: Pulmonary effort is normal. No respiratory distress.      Breath sounds: Normal breath sounds. No wheezing, rhonchi or rales.   Abdominal:      General: Bowel sounds are normal.      Palpations: Abdomen is soft.      Tenderness: There is no abdominal tenderness. There is no guarding or rebound.      Comments: No rigidity   Musculoskeletal:         General: Normal range of motion.      Cervical back: Normal range of motion and neck supple.      Right lower leg: Edema present.      Left lower leg: Tenderness present. Edema present.      Comments: Ecchymosis left knee   Skin:     General: Skin is warm and dry.      Coloration: Skin is not jaundiced.      Comments: Ulcer under bilateral feet   Neurological:      General: No focal deficit present.      Mental Status: He is alert and oriented to person, place, and time. Mental status is at baseline.      Sensory: Sensation is intact.      Motor: Motor function is intact.      Coordination: " Coordination is intact.   Psychiatric:         Attention and Perception: Attention and perception normal.         Mood and Affect: Mood and affect normal.         Speech: Speech normal.         Behavior: Behavior normal.         Judgment: Judgment normal.                  Procedures:  Procedures      Medical Decision Making:      Comorbidities that affect care:    Diabetes    External Notes reviewed:    Previous Clinic Note: Wound care for bilateral foot ulcer management      The following orders were placed and all results were independently analyzed by me:  Orders Placed This Encounter   Procedures   • Blood Culture - Blood,   • Blood Culture - Blood,   • Comprehensive Metabolic Panel   • Lactic Acid, Plasma   • [place] Draw   • CBC Auto Differential   • Scan Slide   • STAT Lactic Acid, Reflex   • Undress & Gown   • Continuous Pulse Oximetry   • Vital Signs   • Oxygen Therapy- Nasal Cannula; 2 LPM; Titrate for SPO2: 92%, Greater Than or Equal To   • Insert Peripheral IV   • CBC & Differential   • Green Top (Gel)   • Lavender Top   • Gold Top - SST   • Light Blue Top       Medications Given in the Emergency Department:  Medications   sodium chloride 0.9 % flush 10 mL (has no administration in time range)        ED Course:         Labs:    Lab Results (last 24 hours)     Procedure Component Value Units Date/Time    CBC & Differential [[id number]]  (Abnormal) Collected: 03/27/23 1001    Specimen: Blood Updated: 03/27/23 1110    Narrative:      The following orders were created for panel order CBC & Differential.  Procedure                               Abnormality         Status                     ---------                               -----------         ------                     CBC Auto Differential[[id number]]        Abnormal            Final result               Scan Slide[[id number]]                                       Final result                 Please view results for these tests on the individual orders.     Comprehensive Metabolic Panel [[id number]]  (Abnormal) Collected: 03/27/23 1001    Specimen: Blood Updated: 03/27/23 1041     Glucose 174 mg/dL      BUN 13 mg/dL      Creatinine 0.49 mg/dL      Sodium 138 mmol/L      Potassium 3.5 mmol/L      Chloride 107 mmol/L      CO2 22.2 mmol/L      Calcium 7.1 mg/dL      Total Protein 5.4 g/dL      Albumin 2.8 g/dL      ALT (SGPT) 31 U/L      AST (SGOT) 39 U/L      Alkaline Phosphatase 166 U/L      Total Bilirubin 1.2 mg/dL      Globulin 2.6 gm/dL      A/G Ratio 1.1 g/dL      BUN/Creatinine Ratio 26.5     Anion Gap 8.8 mmol/L      eGFR 114.6 mL/min/1.73     Narrative:      GFR Normal >60  Chronic Kidney Disease <60  Kidney Failure <15      Lactic Acid, Plasma [[id number]]  (Abnormal) Collected: 03/27/23 1001    Specimen: Blood Updated: 03/27/23 1052     Lactate 2.1 mmol/L     Blood Culture - Blood, Arm, Left [[id number]] Collected: 03/27/23 1001    Specimen: Blood from Arm, Left Updated: 03/27/23 1014    CBC Auto Differential [[id number]]  (Abnormal) Collected: 03/27/23 1001    Specimen: Blood Updated: 03/27/23 1108     WBC 6.08 10*3/mm3      RBC 4.44 10*6/mm3      Hemoglobin 11.4 g/dL      Hematocrit 35.9 %      MCV 80.9 fL      MCH 25.7 pg      MCHC 31.8 g/dL      RDW 19.3 %      RDW-SD 54.7 fl      MPV 11.5 fL      Platelets 96 10*3/mm3      Neutrophil % 91.2 %      Lymphocyte % 3.9 %      Monocyte % 3.9 %      Eosinophil % 0.3 %      Basophil % 0.5 %      Immature Grans % 0.2 %      Neutrophils, Absolute 5.54 10*3/mm3      Lymphocytes, Absolute 0.24 10*3/mm3      Monocytes, Absolute 0.24 10*3/mm3      Eosinophils, Absolute 0.02 10*3/mm3      Basophils, Absolute 0.03 10*3/mm3      Immature Grans, Absolute 0.01 10*3/mm3      nRBC 0.0 /100 WBC     Scan Slide [[id number]] Collected: 03/27/23 1001    Specimen: Blood Updated: 03/27/23 1110     Anisocytosis Slight/1+     WBC Morphology Normal     Platelet Estimate Decreased     Large Platelets Slight/1+    Blood Culture - Blood,  Arm, Left [[id number]] Collected: 03/27/23 1020    Specimen: Blood from Arm, Left Updated: 03/27/23 1022           Imaging:    No Radiology Exams Resulted Within Past 24 Hours      Differential Diagnosis and Discussion:    Extremity Pain: Differential diagnosis includes but is not limited to soft tissue sprain, tendonitis, tendon injury, dislocation, fracture, deep vein thrombosis, arterial insufficiency, osteoarthritis, bursitis, and ligamentous damage.    All labs were reviewed and interpreted by me.  Ultrasound interpretation was reviewed by me.     MDM  Number of Diagnoses or Management Options  Pain of left lower extremity  Diagnosis management comments: In summary this is a 62-year-old male who presents to the emerged department for evaluation of left leg pain.  He reports having an injection of that side in the past weeks.  Clinically he has significant ecchymosis to the left leg with tenderness in the calf.  Ultrasound of the left leg was negative for DVT.  CBC independently reviewed by me and shows no critical abnormalities.  CMP independently reviewed by me and shows no critical abnormalities.  Patient is otherwise well-appearing and in no acute distress very strict return to ER and follow-up instructions have been provided to the patient.             Patient Care Considerations:    CT EXTREMITY: I considered ordering an extremity CT, however Ultrasound negative and patient has reason for the ecchymosis.      Consultants/Shared Management Plan:    None    Social Determinants of Health:    Patient is independent, reliable, and has access to care.       Disposition and Care Coordination:    Discharged: The patient is suitable and stable for discharge with no need for consideration of observation or admission.    I have explained the patient´s condition, diagnoses and treatment plan based on the information available to me at this time. I have answered questions and addressed any concerns. The patient has a  good  understanding of the patient´s diagnosis, condition, and treatment plan as can be expected at this point. The vital signs have been stable. The patient´s condition is stable and appropriate for discharge from the emergency department.      The patient will pursue further outpatient evaluation with the primary care physician or other designated or consulting physician as outlined in the discharge instructions. They are agreeable to this plan of care and follow-up instructions have been explained in detail. The patient has received these instructions in written format and have expressed an understanding of the discharge instructions. The patient is aware that any significant change in condition or worsening of symptoms should prompt an immediate return to this or the closest emergency department or call to 911.  I have explained discharge medications and the need for follow up with the patient/caretakers. This was also printed in the discharge instructions. Patient was discharged with the following medications and follow up:      Medication List      No changes were made to your prescriptions during this visit.      Delia Cervantes, APRN  [address] [address]  [phone]    In 3 days         Final diagnoses:   Pain of left lower extremity        ED Disposition     ED Disposition   Discharge    Condition   Stable    Comment   --             This medical record created using voice recognition software.        Documentation assistance provided by Patricio Pisano acting as scribe for Sergio De La Paz MD. Information recorded by the scribe was done at my direction and has been verified and validated by me.          Patricio Pisano  03/27/23 1042       Sergio De La Paz MD  03/27/23 6311

## 2023-03-28 ENCOUNTER — APPOINTMENT (OUTPATIENT)
Dept: GENERAL RADIOLOGY | Facility: HOSPITAL | Age: 63
End: 2023-03-28
Payer: MEDICARE

## 2023-03-28 ENCOUNTER — TELEPHONE (OUTPATIENT)
Dept: INTERNAL MEDICINE | Facility: CLINIC | Age: 63
End: 2023-03-28
Payer: MEDICARE

## 2023-03-28 VITALS
HEART RATE: 82 BPM | DIASTOLIC BLOOD PRESSURE: 81 MMHG | TEMPERATURE: 101.4 F | RESPIRATION RATE: 20 BRPM | OXYGEN SATURATION: 96 % | SYSTOLIC BLOOD PRESSURE: 162 MMHG

## 2023-03-28 LAB
QT INTERVAL: 328 MS
SARS-COV-2 RNA RESP QL NAA+PROBE: NOT DETECTED

## 2023-03-28 PROCEDURE — 73630 X-RAY EXAM OF FOOT: CPT

## 2023-03-28 PROCEDURE — 73502 X-RAY EXAM HIP UNI 2-3 VIEWS: CPT

## 2023-03-28 RX ORDER — HYDROCODONE BITARTRATE AND ACETAMINOPHEN 7.5; 325 MG/1; MG/1
1 TABLET ORAL ONCE
Status: COMPLETED | OUTPATIENT
Start: 2023-03-28 | End: 2023-03-28

## 2023-03-28 RX ADMIN — HYDROCODONE BITARTRATE AND ACETAMINOPHEN 1 TABLET: 7.5; 325 TABLET ORAL at 02:36

## 2023-03-28 NOTE — TELEPHONE ENCOUNTER
Caller: Jose Shaikh    Relationship: Self    Best call back number: [phone]    What medication are you requesting: POTASSIUM     What are your current symptoms: LOW POTASSIUM     How long have you been experiencing symptoms: UNKNOWN     Have you had these symptoms before:    [] Yes  [x] No    Have you been treated for these symptoms before:   [] Yes  [x] No    If a prescription is needed, what is your preferred pharmacy and phone number: [facility], [address] [phone] [facility] [phone] FX     Additional notes: PATIENT WAS SEEN IN THE ER AND THEY STATED HE HAD LOW POTASSIUM AND NEEDED TO TAKE THE VITAMIN TO HELP BRING HIS LEVELS BACK UP. HE IS WANTING TO KNOW IF A PRESCRIPTION CAN BE CALLED IN SO HIS INSURANCE CAN COVER THIS VITAMIN. PLEASE ADVISE.

## 2023-03-28 NOTE — ED PROVIDER NOTES
Time: 1:50 AM EDT  Date of encounter:  3/27/2023  Independent Historian/Clinical History and Information was obtained by:   Patient  Chief Complaint   Patient presents with   • Weakness - Generalized       History is limited by: N/A    History of Present Illness:    Patient is a 62 y.o. year old male who presents to the emergency department for evaluation of generalized weakness this afternoon along with nausea.  Patient denies fever/chills, urinary complaints, abdominal pain, sore throat, cough, headache, body aches.    Pt reports presenting to the ED today to rule out blood clots in his legs. Pt reports that after going home, he was too weak to get out of bed. Pt reports similar symptoms in the past and states that he had an electrolyte issue at that time. Pt reports knee and hip pain and nausea. Pt denies chest pain, diarrhea, and a cough. Pt does admit to being diabetic.        History provided by:  Patient   used: No        Patient Care Team  Primary Care Provider: Delia Cervantes APRN    Past Medical History:     Allergies   Allergen Reactions   • Adhesive Tape Rash     Past Medical History:   Diagnosis Date   • Absence of toe of right foot    • Acute osteomyelitis of left calcaneus  8/18/2021   • Anxiety and depression    • Arthritis    • Claustrophobia    • Corns and callus    • Diabetic ulcer of left heel associated with type 2 DM 8/18/2021   • Diabetic ulcer of left heel associated with type 2 DM 7/6/2021   • Diabetic ulcer of right midfoot associated with type 2 DM 8/18/2021   • Difficulty walking    • Essential hypertension 8/31/2021   • Hammertoe    • Hyperlipidemia LDL goal <100 8/31/2021   • Ingrown toenail    • Obesity    • Paroxysmal atrial fibrillation 8/31/2021   • Polyneuropathy    • Pressure ulcer, stage 1    • Tinea unguium    • Type 2 diabetes mellitus with polyneuropathy      Past Surgical History:   Procedure Laterality Date   • CYST REMOVAL      center of back; benign    • INCISION AND DRAINAGE ABSCESS      back   • INCISION AND DRAINAGE LEG Right 12/10/2021    Procedure: INCISION AND DRAINAGE LOWER EXTREMITY;  Surgeon: Ash Leyva DPM;  Location: [facility] MAIN OR;  Service: Podiatry;  Laterality: Right;   • OTHER SURGICAL HISTORY      Surgical clips left foot   • TOE SURGERY Right     Removal of 5th toe   • TRANS METATARSAL AMPUTATION Right 12/2/2021    Procedure: AMPUTATION TRANS METATARSAL;  Surgeon: Ash Leyva DPM;  Location: [facility] MAIN OR;  Service: Podiatry;  Laterality: Right;   • WRIST SURGERY Left     repair of injury     Family History   Problem Relation Age of Onset   • Heart disease Mother    • Heart disease Father    • Cancer Father         Unspecified   • Heart disease Brother        Home Medications:  Prior to Admission medications    Medication Sig Start Date End Date Taking? Authorizing Provider   acetaminophen (TYLENOL) 650 MG 8 hr tablet Take 1 tablet by mouth Every 8 (Eight) Hours As Needed for Mild Pain.    ProviderLaura MD   amoxicillin-clavulanate (Augmentin) 875-125 MG per tablet Take 1 tablet by mouth 2 (Two) Times a Day for 14 days. 3/13/23 3/27/23  Shea Daniels MD   apixaban (ELIQUIS) 5 MG tablet tablet Take 1 tablet by mouth Every 12 (Twelve) Hours. 1/31/23   Delia Cervantes APRN   Diclofenac Sodium (VOLTAREN) 1 % gel gel  3/6/23   ProviderLaura MD   doxycycline (VIBRAMYCIN) 100 MG capsule Take 1 capsule by mouth 2 (Two) Times a Day for 14 days. Do not take at the same time as any vitamin or mineral supplements. 3/13/23 3/27/23  Shea Daniels MD   glucose blood test strip Test blood sugar 3 times a day 3/15/23   Kami Garcia APRN   insulin aspart (NovoLOG) 100 UNIT/ML injection Inject 20 Units under the skin into the appropriate area as directed 3 (Three) Times a Day Before Meals. Take up to 20 units as instructed 9/29/21 1/27/23  Kami Garcia APRN   insulin detemir (Levemir FlexTouch) 100  UNIT/ML injection Inject 60 Units under the skin into the appropriate area as directed Daily. 23   Delia Cervantes APRN   Insulin Lispro (HumaLOG KwikPen) 200 UNIT/ML solution pen-injector Inject 30 Units under the skin into the appropriate area as directed 3 (Three) Times a Day Before Meals. 22   Kami Garcia APRN   Insulin Pen Needle (Pen Needles) 32G X 5 MM misc 1 each 4 (Four) Times a Day. 22   Kami Garcia APRN   lisinopril (PRINIVIL,ZESTRIL) 20 MG tablet Take 1 tablet by mouth Daily. 23   Delia Cervantes APRN   meloxicam (MOBIC) 7.5 MG tablet Take 2 tablets by mouth Daily. 3/17/23   Max Parkinson MD   metFORMIN (GLUCOPHAGE) 1000 MG tablet Take 1 tablet by mouth 2 (Two) Times a Day With Meals. 23   Delia Cervantes APRN   metoprolol succinate XL (TOPROL-XL) 50 MG 24 hr tablet Take 1 tablet by mouth Daily. 23   Delia Cervantes APRN   Pro Comfort Lancets 31G misc  22   Laura Connor MD   simvastatin (ZOCOR) 20 MG tablet Take 1 tablet by mouth Every Night. 23   Delia Cervantes APRN   Trulicity 1.5 MG/0.5ML solution pen-injector  3/6/23   ProviderLaura MD        Social History:   Social History     Tobacco Use   • Smoking status: Former     Packs/day: 0.00     Years: 1.00     Pack years: 0.00     Types: Cigarettes     Quit date: 1991     Years since quittin.5   • Smokeless tobacco: Never   • Tobacco comments:     quit at age 30   Vaping Use   • Vaping Use: Never used   Substance Use Topics   • Alcohol use: Yes     Comment: Rare   • Drug use: Yes     Types: Marijuana     Comment: Daily         Review of Systems:  Review of Systems   Constitutional: Negative for chills and fever.   HENT: Negative for congestion, ear pain and sore throat.    Eyes: Negative for pain.   Respiratory: Negative for cough, chest tightness and shortness of breath.    Cardiovascular: Negative for chest pain.   Gastrointestinal: Positive for nausea.  Negative for abdominal pain, diarrhea and vomiting.   Genitourinary: Negative for flank pain and hematuria.   Musculoskeletal: Positive for arthralgias. Negative for joint swelling.   Skin: Negative for pallor.   Neurological: Positive for weakness. Negative for seizures and headaches.   All other systems reviewed and are negative.       Physical Exam:  /81   Pulse 82   Temp (!) 101.4 °F (38.6 °C) (Oral)   Resp 20   SpO2 96%     Physical Exam  Vitals and nursing note reviewed.   Constitutional:       General: He is not in acute distress.     Appearance: Normal appearance. He is not toxic-appearing.   HENT:      Head: Normocephalic and atraumatic.      Mouth/Throat:      Mouth: Mucous membranes are moist.   Eyes:      General: No scleral icterus.     Extraocular Movements: Extraocular movements intact.      Conjunctiva/sclera: Conjunctivae normal.   Cardiovascular:      Rate and Rhythm: Normal rate and regular rhythm.      Pulses: Normal pulses.      Heart sounds: Normal heart sounds.   Pulmonary:      Effort: Pulmonary effort is normal. No respiratory distress.      Breath sounds: Normal breath sounds.   Abdominal:      General: Abdomen is flat. There is no distension.      Palpations: Abdomen is soft.      Tenderness: There is no abdominal tenderness.   Musculoskeletal:         General: Normal range of motion.      Cervical back: Normal range of motion and neck supple.   Feet:      Comments: Amputation to right forefoot;healing wound with no purulent drainage. Wound does not probe to bone  Skin:     General: Skin is warm and dry.      Coloration: Skin is not cyanotic.      Findings: Ecchymosis (left knee) and erythema present.   Neurological:      Mental Status: He is alert and oriented to person, place, and time. Mental status is at baseline.   Psychiatric:         Attention and Perception: Attention and perception normal.         Mood and Affect: Mood normal.                   Procedures:  Procedures      Medical Decision Making:      Comorbidities that affect care:    Atrial Fibrillation, Diabetes, Hypertension, Obesity    External Notes reviewed:    Previous Clinic Note: patient seen by ortho on 3/17/23 for hip and knee pain      The following orders were placed and all results were independently analyzed by me:  Orders Placed This Encounter   Procedures   • COVID-19,APTIMA PANTHER(LUDIN),[facility]/ [facility], NP/OP SWAB IN UTM/VTM/SALINE TRANSPORT MEDIA,24 HR TAT - Swab, Nasal Cavity   • Influenza Antigen, Rapid - Swab, Nasopharynx   • Blood Culture - Blood,   • Blood Culture - Blood,   • XR Chest 1 View   • XR Foot 3+ View Right   • XR Hip With or Without Pelvis 2 - 3 View Left   • [place] Draw   • Comprehensive Metabolic Panel   • Single High Sensitivity Troponin T   • Magnesium   • Urinalysis With Microscopic If Indicated (No Culture) - Urine, Clean Catch   • CBC Auto Differential   • High Sensitivity Troponin T   • High Sensitivity Troponin T 2Hr   • Lactic Acid, Plasma   • Undress & Gown   • Continuous Pulse Oximetry   • Vital Signs   • Wound dressing   • ECG 12 Lead ED Triage Standing Order; Weak / Dizzy / AMS   • CBC & Differential   • Green Top (Gel)   • Lavender Top   • Gold Top - SST   • Light Blue Top       Medications Given in the Emergency Department:  Medications   acetaminophen (TYLENOL) tablet 650 mg (650 mg Oral Given 3/27/23 2246)   sodium chloride 0.9 % bolus 500 mL (0 mL Intravenous Stopped 3/28/23 0118)   ondansetron (ZOFRAN) injection 4 mg (4 mg Intravenous Given 3/27/23 2246)   HYDROcodone-acetaminophen (NORCO) 7.5-325 MG per tablet 1 tablet (1 tablet Oral Given 3/28/23 0236)        ED Course:    The patient was initially evaluated in the triage area where orders were placed. The patient was later dispositioned by Deedee Manning MD.      The patient was advised to stay for completion of workup which includes but is not limited to communication of labs and  radiological results, reassessment and plan. The patient was advised that leaving prior to disposition by a provider could result in critical findings that are not communicated to the patient.          Labs:    Lab Results (last 24 hours)     Procedure Component Value Units Date/Time    CBC & Differential [[id number]]  (Abnormal) Collected: 03/27/23 1904    Specimen: Blood Updated: 03/27/23 2018    Narrative:      The following orders were created for panel order CBC & Differential.  Procedure                               Abnormality         Status                     ---------                               -----------         ------                     CBC Auto Differential[[id number]]        Abnormal            Final result               Scan Slide[[id number]]                                                                    Please view results for these tests on the individual orders.    Comprehensive Metabolic Panel [[id number]]  (Abnormal) Collected: 03/27/23 1904    Specimen: Blood Updated: 03/27/23 2041     Glucose 189 mg/dL      BUN 13 mg/dL      Creatinine 0.68 mg/dL      Sodium 132 mmol/L      Potassium 4.4 mmol/L      Chloride 97 mmol/L      CO2 24.8 mmol/L      Calcium 9.0 mg/dL      Total Protein 7.7 g/dL      Albumin 3.4 g/dL      ALT (SGPT) 41 U/L      AST (SGOT) 52 U/L      Alkaline Phosphatase 211 U/L      Total Bilirubin 1.6 mg/dL      Globulin 4.3 gm/dL      A/G Ratio 0.8 g/dL      BUN/Creatinine Ratio 19.1     Anion Gap 10.2 mmol/L      eGFR 103.8 mL/min/1.73     Narrative:      GFR Normal >60  Chronic Kidney Disease <60  Kidney Failure <15      Single High Sensitivity Troponin T [[id number]]  (Normal) Collected: 03/27/23 1904    Specimen: Blood Updated: 03/27/23 2027     HS Troponin T 12 ng/L     Narrative:      High Sensitive Troponin T Reference Range:  <10.0 ng/L- Negative Female for AMI  <15.0 ng/L- Negative Male for AMI  >=10 - Abnormal Female indicating possible myocardial injury.  >=15 -  Abnormal Male indicating possible myocardial injury.   Clinicians would have to utilize clinical acumen, EKG, Troponin, and serial changes to determine if it is an Acute Myocardial Infarction or myocardial injury due to an underlying chronic condition.         Magnesium [[id number]]  (Abnormal) Collected: 03/27/23 1904    Specimen: Blood Updated: 03/27/23 2027     Magnesium 1.5 mg/dL     CBC Auto Differential [[id number]]  (Abnormal) Collected: 03/27/23 1904    Specimen: Blood Updated: 03/27/23 2018     WBC 5.80 10*3/mm3      RBC 4.71 10*6/mm3      Hemoglobin 12.1 g/dL      Hematocrit 37.3 %      MCV 79.2 fL      MCH 25.7 pg      MCHC 32.4 g/dL      RDW 18.5 %      RDW-SD 52.4 fl      MPV 10.9 fL      Platelets 110 10*3/mm3      Neutrophil % 85.4 %      Lymphocyte % 7.2 %      Monocyte % 6.7 %      Eosinophil % 0.0 %      Basophil % 0.5 %      Immature Grans % 0.2 %      Neutrophils, Absolute 4.95 10*3/mm3      Lymphocytes, Absolute 0.42 10*3/mm3      Monocytes, Absolute 0.39 10*3/mm3      Eosinophils, Absolute 0.00 10*3/mm3      Basophils, Absolute 0.03 10*3/mm3      Immature Grans, Absolute 0.01 10*3/mm3      nRBC 0.0 /100 WBC     High Sensitivity Troponin T [[id number]]  (Normal) Collected: 03/27/23 2131    Specimen: Blood Updated: 03/27/23 2202     HS Troponin T 8 ng/L     Narrative:      High Sensitive Troponin T Reference Range:  <10.0 ng/L- Negative Female for AMI  <15.0 ng/L- Negative Male for AMI  >=10 - Abnormal Female indicating possible myocardial injury.  >=15 - Abnormal Male indicating possible myocardial injury.   Clinicians would have to utilize clinical acumen, EKG, Troponin, and serial changes to determine if it is an Acute Myocardial Infarction or myocardial injury due to an underlying chronic condition.         Urinalysis With Microscopic If Indicated (No Culture) - Urine, Clean Catch [[id number]]  (Abnormal) Collected: 03/27/23 2149    Specimen: Urine, Clean Catch Updated: 03/27/23 2214     Color,  UA Dark Yellow     Appearance, UA Clear     pH, UA 6.0     Specific Gravity, UA 1.024     Glucose,  mg/dL (Trace)     Ketones, UA 15 mg/dL (1+)     Bilirubin, UA Negative     Blood, UA Negative     Protein, UA Negative     Leuk Esterase, UA Negative     Nitrite, UA Negative     Urobilinogen, UA 1.0 E.U./dL    Narrative:      Urine microscopic not indicated.    COVID-19,APTIMA PANTHER(LUDIN),[facility], NP/OP SWAB IN UTM/VTM/SALINE TRANSPORT MEDIA,24 HR TAT - Swab, Nasal Cavity [[id number]]  (Normal) Collected: 03/27/23 2256    Specimen: Swab from Nasal Cavity Updated: 03/28/23 0634     COVID19 Not Detected    Narrative:      Fact sheet for providers: https://www.fda.gov/media/189165/download     Fact sheet for patients: https://www.fda.gov/media/194014/download    Test performed by RT PCR.    Influenza Antigen, Rapid - Swab, Nasopharynx [[id number]]  (Normal) Collected: 03/27/23 2256    Specimen: Swab from Nasopharynx Updated: 03/27/23 2338     Influenza A Ag, EIA Negative     Influenza B Ag, EIA Negative    High Sensitivity Troponin T 2Hr [[id number]]  (Normal) Collected: 03/27/23 2257    Specimen: Blood Updated: 03/27/23 2343     HS Troponin T 9 ng/L      Troponin T Delta 1 ng/L     Narrative:      High Sensitive Troponin T Reference Range:  <10.0 ng/L- Negative Female for AMI  <15.0 ng/L- Negative Male for AMI  >=10 - Abnormal Female indicating possible myocardial injury.  >=15 - Abnormal Male indicating possible myocardial injury.   Clinicians would have to utilize clinical acumen, EKG, Troponin, and serial changes to determine if it is an Acute Myocardial Infarction or myocardial injury due to an underlying chronic condition.         Blood Culture - Blood, Arm, Left [[id number]] Collected: 03/27/23 2257    Specimen: Blood from Arm, Left Updated: 03/27/23 2312    Blood Culture - Blood, Arm, Left [[id number]] Collected: 03/27/23 2257    Specimen: Blood from Arm, Left Updated: 03/27/23 2312    Lactic Acid,  Plasma [[id number]]  (Normal) Collected: 03/27/23 2257    Specimen: Blood Updated: 03/27/23 2337     Lactate 1.4 mmol/L            Imaging:    XR Foot 3+ View Right    Result Date: 3/28/2023  PROCEDURE: XR FOOT 3+ VW RIGHT  COMPARISON: [facility], , XR FOOT 3+ VW RIGHT, 10/01/2022, 12:52.  INDICATIONS: RIGHT FOOT OPEN WOUNDS X 1 YEAR  FINDINGS:  No acute fracture is identified.  There are changes of amputation at the midfoot.  No osseous destructive changes are present.  There is a linear radiopaque density within the soft tissues of the heel.        1. No acute osseous process identified. 2. Linear radiopaque density within soft tissues of heel, possibly a foreign body.      ELDER CASILLAS MD       Electronically Signed and Approved By: ELDER CASILLAS MD on 3/28/2023 at 3:05             XR Chest 1 View    Result Date: 3/27/2023  PROCEDURE: XR CHEST 1 VW  COMPARISON: None  INDICATIONS: Weak/Dizzy/AMS triage protocol  FINDINGS:  No acute infiltrate or effusion is seen.  The cardiac and mediastinal silhouettes appear normal.        1. No acute cardiopulmonary disease       Slava Galvan M.D.       Electronically Signed and Approved By: Slava Galvan M.D. on 3/27/2023 at 20:15             XR Hip With or Without Pelvis 2 - 3 View Left    Result Date: 3/28/2023  PROCEDURE: XR HIP W OR WO PELVIS 2-3 VIEW LEFT  COMPARISON: [facility], , XR HIP W OR WO PELVIS 2-3 VIEW LEFT, 12/20/2022, 19:08.  INDICATIONS: LEFT HIP PAIN X 4 MONTHS NKI  FINDINGS:  No acute fracture is identified.  The pelvic ring appears intact.  Mild degenerative changes are present.  The soft tissues are unremarkable.       No acute osseous process identified.      ELDER CASILLAS MD       Electronically Signed and Approved By: ELDER CASILLAS MD on 3/28/2023 at 3:04                 Differential Diagnosis and Discussion:      Weakness: Based on the patient's history, signs, and symptoms, the diffential diagnosis  includes but is not limited to meningitis, stroke, sepsis, subarachnoid hemorrhage, intracranial bleeding, encephalitis, acute uti, dehydration, MS, myasthenia gravis, Guillan [place], migraine variant, neuromuscular disorders vertigo, electrolyte imbalance, and metabolic disorders.    All labs were reviewed and interpreted by me.  All X-rays were independently reviewed by me.  EKG was interpreted by me.    MDM  Number of Diagnoses or Management Options  Viral syndrome  Diagnosis management comments: On arrival patient is febrile with a temp of 100.6.  Patient states he was seen earlier in the ER for evaluation for possible DVT.  Per patient study was negative.  He returned because of generalized weakness.  Labs on arrival showed normal white count of 5.8.  Hemoglobin 12.  Hemoglobin up from previous.  Creatinine 0.68.  Similar to previous.  Patient does have mildly elevated LFTs.  This again is similar to previous. No abdominal tenderness on exam. Chest x-ray showed no acute finding.  Patient does have a chronic wound to his right foot.  X-ray did not show any obvious osteomyelitis.  He also reports chronic hip pain.  X-ray did not show any acute findings.  Patient was swabbed for flu and COVID.  Patient was given Tylenol.  At time of evaluation fever resolved.  Patient reports feeling better.  He is tolerating p.o. intake.  He is able to ambulate. At this time no definite source of infection.  Patient possibly had a viral infection recommend symptomatic treatment.  At this time he appears well.  Recommend close follow-up with his primary physician.  Discussed return precautions, discharge instructions and answered all his questions.       Amount and/or Complexity of Data Reviewed  Clinical lab tests: reviewed  Tests in the radiology section of CPT®: reviewed  Review and summarize past medical records: yes  Independent visualization of images, tracings, or specimens: yes    Risk of Complications, Morbidity, and/or  Mortality  Presenting problems: moderate  Management options: moderate             Patient Care Considerations:    ANTIBIOTICS: I considered prescribing antibiotics as an outpatient however no definite source of infection identified. Patient likely has a viral infection      Consultants/Shared Management Plan:    None    Social Determinants of Health:    Patient is independent, reliable, and has access to care.       Disposition and Care Coordination:    Discharged: I considered escalation of care by admitting this patient for observation, however the patient has improved and is suitable and  stable for discharge.    I have explained the patient´s condition, diagnoses and treatment plan based on the information available to me at this time. I have answered questions and addressed any concerns. The patient has a good  understanding of the patient´s diagnosis, condition, and treatment plan as can be expected at this point. The vital signs have been stable. The patient´s condition is stable and appropriate for discharge from the emergency department.      The patient will pursue further outpatient evaluation with the primary care physician or other designated or consulting physician as outlined in the discharge instructions. They are agreeable to this plan of care and follow-up instructions have been explained in detail. The patient has received these instructions in written format and have expressed an understanding of the discharge instructions. The patient is aware that any significant change in condition or worsening of symptoms should prompt an immediate return to this or the closest emergency department or call to 911.  I have explained discharge medications and the need for follow up with the patient/caretakers. This was also printed in the discharge instructions. Patient was discharged with the following medications and follow up:      Medication List      Stop    amoxicillin-clavulanate 875-125 MG per  tablet  Commonly known as: Augmentin     doxycycline 100 MG capsule  Commonly known as: VIBRAMYCIN         Billy Delia M, APRN  [address] [address]  [phone]    In 2 days         Final diagnoses:   Viral syndrome        ED Disposition     ED Disposition   Discharge    Condition   Stable    Comment   --             This medical record created using voice recognition software.      Documentation assistance provided by Gustavo Ramirez acting as scribe for Deedee Manning MD. Information recorded by the scribe was done at my direction and has been verified and validated by me.        Gustavo Ramirez  03/28/23 0220       Deedee Manning MD  03/28/23 9616

## 2023-03-29 ENCOUNTER — TELEPHONE (OUTPATIENT)
Dept: INTERNAL MEDICINE | Facility: CLINIC | Age: 63
End: 2023-03-29
Payer: MEDICARE

## 2023-03-29 DIAGNOSIS — E87.8 ELECTROLYTE IMBALANCE: Primary | ICD-10-CM

## 2023-03-29 NOTE — TELEPHONE ENCOUNTER
Patient was seen in ER on 3/27/23. Stated he was told his potassium was low. Patient has an appt with Keena on 3/31/23.     Glucose 189 mg/dL           BUN 13 mg/dL         Creatinine 0.68 mg/dL         Sodium 132 mmol/L         Potassium 4.4 mmol/L         Chloride 97 mmol/L         CO2 24.8 mmol/L         Calcium 9.0 mg/dL         Total Protein 7.7 g/dL         Albumin 3.4 g/dL         ALT (SGPT) 41 U/L         AST (SGOT) 52 U/L         Alkaline Phosphatase 211 U/L         Total Bilirubin 1.6 mg/dL         Globulin 4.3 gm/dL         A/G Ratio 0.8 g/dL         BUN/Creatinine Ratio 19.1       Anion Gap 10.2 mmol/L         eGFR 103.8 mL/min/1.73       Narrative:

## 2023-03-31 ENCOUNTER — TELEPHONE (OUTPATIENT)
Dept: INTERNAL MEDICINE | Facility: CLINIC | Age: 63
End: 2023-03-31
Payer: MEDICARE

## 2023-03-31 NOTE — TELEPHONE ENCOUNTER
Caller: Jose Shaikh    Relationship to patient: Self    Best call back number: [phone]     PATIENT STATED HIS HOME HEALTH NURSE IS ABLE TO COME OUT TO THE HOUSE TO DRAW BLOOD FOR HIS BLOOD WORK ORDER. PATIENT WAS NOT ABLE TO MAKE APPOINTMENT TODAY 3-31-23. PATIENT WAS NOT FEELING WELL ENOUGH TO COME IN.    PLEASE ADVISE

## 2023-04-01 LAB
BACTERIA SPEC AEROBE CULT: NORMAL

## 2023-04-03 ENCOUNTER — APPOINTMENT (OUTPATIENT)
Dept: MRI IMAGING | Facility: HOSPITAL | Age: 63
DRG: 623 | End: 2023-04-03
Payer: MEDICARE

## 2023-04-03 ENCOUNTER — OFFICE VISIT (OUTPATIENT)
Dept: WOUND CARE | Facility: HOSPITAL | Age: 63
DRG: 623 | End: 2023-04-03
Payer: MEDICARE

## 2023-04-03 ENCOUNTER — HOSPITAL ENCOUNTER (INPATIENT)
Facility: HOSPITAL | Age: 63
LOS: 3 days | Discharge: HOME HEALTH | DRG: 623 | End: 2023-04-06
Attending: INTERNAL MEDICINE | Admitting: INTERNAL MEDICINE
Payer: MEDICARE

## 2023-04-03 ENCOUNTER — PREP FOR SURGERY (OUTPATIENT)
Dept: OTHER | Facility: HOSPITAL | Age: 63
End: 2023-04-03
Payer: MEDICARE

## 2023-04-03 VITALS
SYSTOLIC BLOOD PRESSURE: 130 MMHG | DIASTOLIC BLOOD PRESSURE: 74 MMHG | HEART RATE: 99 BPM | TEMPERATURE: 98.2 F | RESPIRATION RATE: 18 BRPM

## 2023-04-03 DIAGNOSIS — Z78.9 DECREASED ACTIVITIES OF DAILY LIVING (ADL): Primary | ICD-10-CM

## 2023-04-03 DIAGNOSIS — E11.42 DIABETIC POLYNEUROPATHY ASSOCIATED WITH TYPE 2 DIABETES MELLITUS: ICD-10-CM

## 2023-04-03 DIAGNOSIS — E11.621 DIABETIC ULCER OF LEFT HEEL ASSOCIATED WITH TYPE 2 DIABETES MELLITUS, LIMITED TO BREAKDOWN OF SKIN: ICD-10-CM

## 2023-04-03 DIAGNOSIS — L08.9 WOUND INFECTION: ICD-10-CM

## 2023-04-03 DIAGNOSIS — L03.115 CELLULITIS OF RIGHT FOOT: ICD-10-CM

## 2023-04-03 DIAGNOSIS — Z60.9 HIGH RISK SOCIAL SITUATION: ICD-10-CM

## 2023-04-03 DIAGNOSIS — R26.2 DIFFICULTY WALKING: ICD-10-CM

## 2023-04-03 DIAGNOSIS — T14.8XXA WOUND INFECTION: ICD-10-CM

## 2023-04-03 DIAGNOSIS — E66.01 CLASS 3 SEVERE OBESITY DUE TO EXCESS CALORIES WITH SERIOUS COMORBIDITY AND BODY MASS INDEX (BMI) OF 45.0 TO 49.9 IN ADULT: ICD-10-CM

## 2023-04-03 DIAGNOSIS — T81.31XD POSTOPERATIVE WOUND DEHISCENCE, SUBSEQUENT ENCOUNTER: Primary | ICD-10-CM

## 2023-04-03 DIAGNOSIS — L97.421 DIABETIC ULCER OF LEFT HEEL ASSOCIATED WITH TYPE 2 DIABETES MELLITUS, LIMITED TO BREAKDOWN OF SKIN: ICD-10-CM

## 2023-04-03 DIAGNOSIS — Z89.431 STATUS POST AMPUTATION OF RIGHT FOOT THROUGH METATARSAL BONE: ICD-10-CM

## 2023-04-03 DIAGNOSIS — M86.671 OTHER CHRONIC OSTEOMYELITIS, RIGHT ANKLE AND FOOT: ICD-10-CM

## 2023-04-03 DIAGNOSIS — L89.893 PRESSURE INJURY OF RIGHT FOOT, STAGE 3: ICD-10-CM

## 2023-04-03 PROBLEM — E11.628 DIABETIC FOOT INFECTION: Status: ACTIVE | Noted: 2023-04-03

## 2023-04-03 PROBLEM — S91.301A UNSPECIFIED OPEN WOUND, RIGHT FOOT, INITIAL ENCOUNTER: Status: ACTIVE | Noted: 2023-04-03

## 2023-04-03 LAB
ALBUMIN SERPL-MCNC: 3.1 G/DL (ref 3.5–5.2)
ALBUMIN/GLOB SERPL: 0.8 G/DL
ALP SERPL-CCNC: 173 U/L (ref 39–117)
ALT SERPL W P-5'-P-CCNC: 32 U/L (ref 1–41)
ANION GAP SERPL CALCULATED.3IONS-SCNC: 11.9 MMOL/L (ref 5–15)
AST SERPL-CCNC: 38 U/L (ref 1–40)
BASOPHILS # BLD AUTO: 0.04 10*3/MM3 (ref 0–0.2)
BASOPHILS NFR BLD AUTO: 0.6 % (ref 0–1.5)
BILIRUB SERPL-MCNC: 1.1 MG/DL (ref 0–1.2)
BUN SERPL-MCNC: 12 MG/DL (ref 8–23)
BUN/CREAT SERPL: 17.6 (ref 7–25)
CALCIUM SPEC-SCNC: 8.4 MG/DL (ref 8.6–10.5)
CHLORIDE SERPL-SCNC: 99 MMOL/L (ref 98–107)
CO2 SERPL-SCNC: 23.1 MMOL/L (ref 22–29)
CREAT SERPL-MCNC: 0.68 MG/DL (ref 0.76–1.27)
CRP SERPL-MCNC: 7.4 MG/DL (ref 0–0.5)
DEPRECATED RDW RBC AUTO: 53 FL (ref 37–54)
EGFRCR SERPLBLD CKD-EPI 2021: 103.8 ML/MIN/1.73
EOSINOPHIL # BLD AUTO: 0.06 10*3/MM3 (ref 0–0.4)
EOSINOPHIL NFR BLD AUTO: 0.9 % (ref 0.3–6.2)
ERYTHROCYTE [DISTWIDTH] IN BLOOD BY AUTOMATED COUNT: 18.1 % (ref 12.3–15.4)
ERYTHROCYTE [SEDIMENTATION RATE] IN BLOOD: 42 MM/HR (ref 0–20)
GLOBULIN UR ELPH-MCNC: 3.8 GM/DL
GLUCOSE BLDC GLUCOMTR-MCNC: 145 MG/DL (ref 70–99)
GLUCOSE BLDC GLUCOMTR-MCNC: 195 MG/DL (ref 70–99)
GLUCOSE SERPL-MCNC: 188 MG/DL (ref 65–99)
HCT VFR BLD AUTO: 36.7 % (ref 37.5–51)
HGB BLD-MCNC: 11.8 G/DL (ref 13–17.7)
IMM GRANULOCYTES # BLD AUTO: 0.02 10*3/MM3 (ref 0–0.05)
IMM GRANULOCYTES NFR BLD AUTO: 0.3 % (ref 0–0.5)
LYMPHOCYTES # BLD AUTO: 0.91 10*3/MM3 (ref 0.7–3.1)
LYMPHOCYTES NFR BLD AUTO: 13.8 % (ref 19.6–45.3)
MAGNESIUM SERPL-MCNC: 1.9 MG/DL (ref 1.6–2.4)
MCH RBC QN AUTO: 25.7 PG (ref 26.6–33)
MCHC RBC AUTO-ENTMCNC: 32.2 G/DL (ref 31.5–35.7)
MCV RBC AUTO: 80 FL (ref 79–97)
MONOCYTES # BLD AUTO: 0.68 10*3/MM3 (ref 0.1–0.9)
MONOCYTES NFR BLD AUTO: 10.3 % (ref 5–12)
MRSA DNA SPEC QL NAA+PROBE: NORMAL
NEUTROPHILS NFR BLD AUTO: 4.9 10*3/MM3 (ref 1.7–7)
NEUTROPHILS NFR BLD AUTO: 74.1 % (ref 42.7–76)
NRBC BLD AUTO-RTO: 0 /100 WBC (ref 0–0.2)
PHOSPHATE SERPL-MCNC: 2.9 MG/DL (ref 2.5–4.5)
PLATELET # BLD AUTO: 132 10*3/MM3 (ref 140–450)
PMV BLD AUTO: 11.8 FL (ref 6–12)
POTASSIUM SERPL-SCNC: 3.9 MMOL/L (ref 3.5–5.2)
PROT SERPL-MCNC: 6.9 G/DL (ref 6–8.5)
RBC # BLD AUTO: 4.59 10*6/MM3 (ref 4.14–5.8)
SODIUM SERPL-SCNC: 134 MMOL/L (ref 136–145)
WBC NRBC COR # BLD: 6.61 10*3/MM3 (ref 3.4–10.8)

## 2023-04-03 PROCEDURE — 99223 1ST HOSP IP/OBS HIGH 75: CPT | Performed by: STUDENT IN AN ORGANIZED HEALTH CARE EDUCATION/TRAINING PROGRAM

## 2023-04-03 PROCEDURE — 25010000002 VANCOMYCIN 5 G RECONSTITUTED SOLUTION: Performed by: STUDENT IN AN ORGANIZED HEALTH CARE EDUCATION/TRAINING PROGRAM

## 2023-04-03 PROCEDURE — 87040 BLOOD CULTURE FOR BACTERIA: CPT | Performed by: STUDENT IN AN ORGANIZED HEALTH CARE EDUCATION/TRAINING PROGRAM

## 2023-04-03 PROCEDURE — 84100 ASSAY OF PHOSPHORUS: CPT | Performed by: STUDENT IN AN ORGANIZED HEALTH CARE EDUCATION/TRAINING PROGRAM

## 2023-04-03 PROCEDURE — 80053 COMPREHEN METABOLIC PANEL: CPT | Performed by: STUDENT IN AN ORGANIZED HEALTH CARE EDUCATION/TRAINING PROGRAM

## 2023-04-03 PROCEDURE — 63710000001 INSULIN LISPRO (HUMAN) PER 5 UNITS: Performed by: STUDENT IN AN ORGANIZED HEALTH CARE EDUCATION/TRAINING PROGRAM

## 2023-04-03 PROCEDURE — 11045 DBRDMT SUBQ TISS EACH ADDL: CPT | Performed by: EMERGENCY MEDICINE

## 2023-04-03 PROCEDURE — 80184 ASSAY OF PHENOBARBITAL: CPT | Performed by: STUDENT IN AN ORGANIZED HEALTH CARE EDUCATION/TRAINING PROGRAM

## 2023-04-03 PROCEDURE — 73718 MRI LOWER EXTREMITY W/O DYE: CPT

## 2023-04-03 PROCEDURE — 0JBQ0ZZ EXCISION OF RIGHT FOOT SUBCUTANEOUS TISSUE AND FASCIA, OPEN APPROACH: ICD-10-PCS | Performed by: EMERGENCY MEDICINE

## 2023-04-03 PROCEDURE — 3078F DIAST BP <80 MM HG: CPT | Performed by: EMERGENCY MEDICINE

## 2023-04-03 PROCEDURE — 86140 C-REACTIVE PROTEIN: CPT | Performed by: STUDENT IN AN ORGANIZED HEALTH CARE EDUCATION/TRAINING PROGRAM

## 2023-04-03 PROCEDURE — 97597 DBRDMT OPN WND 1ST 20 CM/<: CPT | Performed by: EMERGENCY MEDICINE

## 2023-04-03 PROCEDURE — 25010000002 CEFEPIME PER 500 MG: Performed by: STUDENT IN AN ORGANIZED HEALTH CARE EDUCATION/TRAINING PROGRAM

## 2023-04-03 PROCEDURE — 87205 SMEAR GRAM STAIN: CPT | Performed by: EMERGENCY MEDICINE

## 2023-04-03 PROCEDURE — G0480 DRUG TEST DEF 1-7 CLASSES: HCPCS | Performed by: STUDENT IN AN ORGANIZED HEALTH CARE EDUCATION/TRAINING PROGRAM

## 2023-04-03 PROCEDURE — 3075F SYST BP GE 130 - 139MM HG: CPT | Performed by: EMERGENCY MEDICINE

## 2023-04-03 PROCEDURE — 85652 RBC SED RATE AUTOMATED: CPT | Performed by: STUDENT IN AN ORGANIZED HEALTH CARE EDUCATION/TRAINING PROGRAM

## 2023-04-03 PROCEDURE — 80179 DRUG ASSAY SALICYLATE: CPT | Performed by: STUDENT IN AN ORGANIZED HEALTH CARE EDUCATION/TRAINING PROGRAM

## 2023-04-03 PROCEDURE — 87186 SC STD MICRODIL/AGAR DIL: CPT | Performed by: EMERGENCY MEDICINE

## 2023-04-03 PROCEDURE — 87641 MR-STAPH DNA AMP PROBE: CPT | Performed by: STUDENT IN AN ORGANIZED HEALTH CARE EDUCATION/TRAINING PROGRAM

## 2023-04-03 PROCEDURE — 25010000002 MORPHINE PER 10 MG: Performed by: STUDENT IN AN ORGANIZED HEALTH CARE EDUCATION/TRAINING PROGRAM

## 2023-04-03 PROCEDURE — 99215 OFFICE O/P EST HI 40 MIN: CPT | Performed by: EMERGENCY MEDICINE

## 2023-04-03 PROCEDURE — 87077 CULTURE AEROBIC IDENTIFY: CPT | Performed by: EMERGENCY MEDICINE

## 2023-04-03 PROCEDURE — 11042 DBRDMT SUBQ TIS 1ST 20SQCM/<: CPT | Performed by: EMERGENCY MEDICINE

## 2023-04-03 PROCEDURE — 82962 GLUCOSE BLOOD TEST: CPT

## 2023-04-03 PROCEDURE — 87070 CULTURE OTHR SPECIMN AEROBIC: CPT | Performed by: EMERGENCY MEDICINE

## 2023-04-03 PROCEDURE — 25010000002 ENOXAPARIN PER 10 MG: Performed by: STUDENT IN AN ORGANIZED HEALTH CARE EDUCATION/TRAINING PROGRAM

## 2023-04-03 PROCEDURE — 83735 ASSAY OF MAGNESIUM: CPT | Performed by: STUDENT IN AN ORGANIZED HEALTH CARE EDUCATION/TRAINING PROGRAM

## 2023-04-03 PROCEDURE — 85025 COMPLETE CBC W/AUTO DIFF WBC: CPT | Performed by: STUDENT IN AN ORGANIZED HEALTH CARE EDUCATION/TRAINING PROGRAM

## 2023-04-03 RX ORDER — ALUMINA, MAGNESIA, AND SIMETHICONE 2400; 2400; 240 MG/30ML; MG/30ML; MG/30ML
15 SUSPENSION ORAL EVERY 6 HOURS PRN
Status: DISCONTINUED | OUTPATIENT
Start: 2023-04-03 | End: 2023-04-06 | Stop reason: HOSPADM

## 2023-04-03 RX ORDER — NALOXONE HCL 0.4 MG/ML
0.4 VIAL (ML) INJECTION
Status: DISCONTINUED | OUTPATIENT
Start: 2023-04-03 | End: 2023-04-06 | Stop reason: HOSPADM

## 2023-04-03 RX ORDER — OXYCODONE AND ACETAMINOPHEN 7.5; 325 MG/1; MG/1
1 TABLET ORAL EVERY 6 HOURS PRN
Status: DISCONTINUED | OUTPATIENT
Start: 2023-04-03 | End: 2023-04-06 | Stop reason: HOSPADM

## 2023-04-03 RX ORDER — ACETAMINOPHEN 160 MG/5ML
650 SOLUTION ORAL EVERY 4 HOURS PRN
Status: DISCONTINUED | OUTPATIENT
Start: 2023-04-03 | End: 2023-04-06 | Stop reason: HOSPADM

## 2023-04-03 RX ORDER — POLYETHYLENE GLYCOL 3350 17 G/17G
17 POWDER, FOR SOLUTION ORAL DAILY PRN
Status: DISCONTINUED | OUTPATIENT
Start: 2023-04-03 | End: 2023-04-06 | Stop reason: HOSPADM

## 2023-04-03 RX ORDER — SODIUM CHLORIDE 0.9 % (FLUSH) 0.9 %
10 SYRINGE (ML) INJECTION EVERY 12 HOURS SCHEDULED
Status: DISCONTINUED | OUTPATIENT
Start: 2023-04-03 | End: 2023-04-06 | Stop reason: HOSPADM

## 2023-04-03 RX ORDER — SODIUM CHLORIDE 9 MG/ML
40 INJECTION, SOLUTION INTRAVENOUS AS NEEDED
Status: DISCONTINUED | OUTPATIENT
Start: 2023-04-03 | End: 2023-04-06 | Stop reason: HOSPADM

## 2023-04-03 RX ORDER — SODIUM CHLORIDE 0.9 % (FLUSH) 0.9 %
10 SYRINGE (ML) INJECTION AS NEEDED
Status: DISCONTINUED | OUTPATIENT
Start: 2023-04-03 | End: 2023-04-06 | Stop reason: HOSPADM

## 2023-04-03 RX ORDER — INSULIN LISPRO 100 [IU]/ML
2-9 INJECTION, SOLUTION INTRAVENOUS; SUBCUTANEOUS
Status: DISCONTINUED | OUTPATIENT
Start: 2023-04-03 | End: 2023-04-06 | Stop reason: HOSPADM

## 2023-04-03 RX ORDER — ENOXAPARIN SODIUM 100 MG/ML
1 INJECTION SUBCUTANEOUS EVERY 12 HOURS SCHEDULED
Status: DISCONTINUED | OUTPATIENT
Start: 2023-04-03 | End: 2023-04-06 | Stop reason: HOSPADM

## 2023-04-03 RX ORDER — MORPHINE SULFATE 2 MG/ML
2 INJECTION, SOLUTION INTRAMUSCULAR; INTRAVENOUS EVERY 4 HOURS PRN
Status: DISCONTINUED | OUTPATIENT
Start: 2023-04-03 | End: 2023-04-06 | Stop reason: HOSPADM

## 2023-04-03 RX ORDER — BISACODYL 5 MG/1
5 TABLET, DELAYED RELEASE ORAL DAILY PRN
Status: DISCONTINUED | OUTPATIENT
Start: 2023-04-03 | End: 2023-04-06 | Stop reason: HOSPADM

## 2023-04-03 RX ORDER — ATORVASTATIN CALCIUM 10 MG/1
10 TABLET, FILM COATED ORAL DAILY
Status: DISCONTINUED | OUTPATIENT
Start: 2023-04-03 | End: 2023-04-06

## 2023-04-03 RX ORDER — BISACODYL 10 MG
10 SUPPOSITORY, RECTAL RECTAL DAILY PRN
Status: DISCONTINUED | OUTPATIENT
Start: 2023-04-03 | End: 2023-04-06 | Stop reason: HOSPADM

## 2023-04-03 RX ORDER — METOPROLOL SUCCINATE 50 MG/1
50 TABLET, EXTENDED RELEASE ORAL DAILY
Status: DISCONTINUED | OUTPATIENT
Start: 2023-04-03 | End: 2023-04-06 | Stop reason: HOSPADM

## 2023-04-03 RX ORDER — NICOTINE POLACRILEX 4 MG
15 LOZENGE BUCCAL
Status: DISCONTINUED | OUTPATIENT
Start: 2023-04-03 | End: 2023-04-06 | Stop reason: HOSPADM

## 2023-04-03 RX ORDER — AMOXICILLIN 250 MG
2 CAPSULE ORAL 2 TIMES DAILY
Status: DISCONTINUED | OUTPATIENT
Start: 2023-04-03 | End: 2023-04-06 | Stop reason: HOSPADM

## 2023-04-03 RX ORDER — ONDANSETRON 2 MG/ML
4 INJECTION INTRAMUSCULAR; INTRAVENOUS EVERY 6 HOURS PRN
Status: DISCONTINUED | OUTPATIENT
Start: 2023-04-03 | End: 2023-04-06 | Stop reason: HOSPADM

## 2023-04-03 RX ORDER — ACETAMINOPHEN 325 MG/1
650 TABLET ORAL EVERY 4 HOURS PRN
Status: DISCONTINUED | OUTPATIENT
Start: 2023-04-03 | End: 2023-04-06 | Stop reason: HOSPADM

## 2023-04-03 RX ORDER — DEXTROSE MONOHYDRATE 25 G/50ML
25 INJECTION, SOLUTION INTRAVENOUS
Status: DISCONTINUED | OUTPATIENT
Start: 2023-04-03 | End: 2023-04-06 | Stop reason: HOSPADM

## 2023-04-03 RX ORDER — ACETAMINOPHEN 650 MG/1
650 SUPPOSITORY RECTAL EVERY 4 HOURS PRN
Status: DISCONTINUED | OUTPATIENT
Start: 2023-04-03 | End: 2023-04-06 | Stop reason: HOSPADM

## 2023-04-03 RX ADMIN — VANCOMYCIN HYDROCHLORIDE 3000 MG: 5 INJECTION, POWDER, LYOPHILIZED, FOR SOLUTION INTRAVENOUS at 18:30

## 2023-04-03 RX ADMIN — MORPHINE SULFATE 2 MG: 2 INJECTION, SOLUTION INTRAMUSCULAR; INTRAVENOUS at 21:14

## 2023-04-03 RX ADMIN — ENOXAPARIN SODIUM 170 MG: 100 INJECTION SUBCUTANEOUS at 21:12

## 2023-04-03 RX ADMIN — CEFEPIME HYDROCHLORIDE 1 G: 1 INJECTION, POWDER, FOR SOLUTION INTRAMUSCULAR; INTRAVENOUS at 16:52

## 2023-04-03 RX ADMIN — DAKIN'S SOLUTION 0.125% (QUARTER STRENGTH): 0.12 SOLUTION at 21:13

## 2023-04-03 RX ADMIN — INSULIN LISPRO 2 UNITS: 100 INJECTION, SOLUTION INTRAVENOUS; SUBCUTANEOUS at 21:12

## 2023-04-03 RX ADMIN — METOPROLOL SUCCINATE 50 MG: 50 TABLET, EXTENDED RELEASE ORAL at 16:52

## 2023-04-03 RX ADMIN — DICLOFENAC SODIUM 2 G: 10 GEL TOPICAL at 21:13

## 2023-04-03 RX ADMIN — MORPHINE SULFATE 2 MG: 2 INJECTION, SOLUTION INTRAMUSCULAR; INTRAVENOUS at 16:52

## 2023-04-03 RX ADMIN — ATORVASTATIN CALCIUM 10 MG: 10 TABLET, FILM COATED ORAL at 16:52

## 2023-04-03 RX ADMIN — Medication 10 ML: at 21:12

## 2023-04-03 SDOH — SOCIAL STABILITY - SOCIAL INSECURITY: PROBLEM RELATED TO SOCIAL ENVIRONMENT, UNSPECIFIED: Z60.9

## 2023-04-03 NOTE — PROGRESS NOTES
[facility] Clinical Pharmacy Services: Vancomycin Pharmacokinetic Initial Consult Note    Jose Shaikh is a 62 y.o. male who is on day 1 of pharmacy to dose vancomycin for Skin and Soft Tissue.    Consult Information  Consulting Provider: MARI  Planned Duration of Therapy: 7 DAYS  Was Patient Receiving Prior to Admission/Consult?: No  Loading Dose Ordered: 3000 mg  PK/PD Target: -600 mg/L.hr   Relevant ID History: 23 RIGHT FOOT WOUND + FOR PROTEUS MIRABILIS & ENTEROCOCCUS FAECALIS  Other Antimicrobials: Cefepime    Imaging Reviewed?: N/A    Microbiology Data  MRSA PCR performed: 23; Result: Pending  Culture/Source:   3-27-23: BLOOD CX, LEFT ARM: NGD5  3-27-23: BLOOD CX, LEFT ARM: NGD5  3-27-23: COVID-19 PCR: NOT DETECTED  3-27-23: INFLUENZA A&B AG: NEGATIVE  3-27-23: BLOOD CX, LEFT ARM: NGD5  3-27-23: BLOOD CX, LEFT ARM: NGD5    Vitals/Labs  Ht: 185.4 cm  ; Wt: 170 kg     Temp (24hrs), Av °F (36.7 °C), Min:97.8 °F (36.6 °C), Max:98.2 °F (36.8 °C)    Estimated Creatinine Clearance: 180.1 mL/min (A) (by C-G formula based on SCr of 0.68 mg/dL (L)).       Results from last 7 days   Lab Units 23  1904   CREATININE mg/dL 0.68*   WBC 10*3/mm3 5.80     Assessment/Plan:    Vancomycin Dose: Loading dose: 3000 mg followed by 1250 mg IV every 12 hours should provide the following predicted parameters:  AUC24,ss: 448 mg/L.hr  PAUC*: 58 %  Ctrough,ss: 11.2 mg/L  Pconc*: 25 %  Tox.: 7 %  Vanc Random ordered for tomorrow at 0600  Patient has order for Basic Metabolic Panel    Pharmacy will follow patient's kidney function and will adjust doses and obtain levels as necessary. Thank you for involving pharmacy in this patient's care. Please contact pharmacy with any questions or concerns.                           Irma Kramer  Clinical Pharmacist

## 2023-04-03 NOTE — H&P
[facility] HISTORY AND PHYSICAL  Date: 4/3/2023   Patient Name: Jose Shaikh  : 1960  MRN: [id number]  Primary Care Physician:  Delia Cervantes APRN  Date of admission: 4/3/2023    Subjective   Subjective      Chief Complaint: Right-sided nonhealing foot wound, left plantar ulcer    HPI:    Jose Shaikh is a 62 y.o. male with past medical history stable for previous history of osteomyelitis status post transmetatarsal amputation of the right foot, paroxysmal atrial fibrillation on Eliquis, essential hypertension, hyperlipidemia, depression, and anxiety who presents with chief complaint of worsening right foot infection.  Patient underwent previous transmetatarsal amputation    Personal History     Past Medical History:  Past Medical History:   Diagnosis Date   • Absence of toe of right foot    • Acute osteomyelitis of left calcaneus  2021   • Anxiety and depression    • Arthritis    • Claustrophobia    • Corns and callus    • Diabetic ulcer of left heel associated with type 2 DM 2021   • Diabetic ulcer of left heel associated with type 2 DM 2021   • Diabetic ulcer of right midfoot associated with type 2 DM 2021   • Difficulty walking    • Essential hypertension 2021   • Hammertoe    • Hyperlipidemia LDL goal <100 2021   • Ingrown toenail    • Obesity    • Paroxysmal atrial fibrillation 2021   • Polyneuropathy    • Pressure ulcer, stage 1    • Tinea unguium    • Type 2 diabetes mellitus with polyneuropathy          Past Surgical History:  Past Surgical History:   Procedure Laterality Date   • CYST REMOVAL      center of back; benign   • INCISION AND DRAINAGE ABSCESS      back   • INCISION AND DRAINAGE LEG Right 12/10/2021    Procedure: INCISION AND DRAINAGE LOWER EXTREMITY;  Surgeon: Ash Leyva DPM;  Location: [facility] OR;  Service: Podiatry;  Laterality: Right;   • OTHER SURGICAL HISTORY      Surgical clips left foot   • TOE SURGERY  Right     Removal of 5th toe   • TRANS METATARSAL AMPUTATION Right 12/2/2021    Procedure: AMPUTATION TRANS METATARSAL;  Surgeon: Ash Leyva DPM;  Location: [facility];  Service: Podiatry;  Laterality: Right;   • WRIST SURGERY Left     repair of injury         Family History:   Patient is a positive family history of heart disease in his brother and father.    Social History:   Patient is a former tobacco user.  He denies any alcohol or illicit drug use.    Home Medications:  Diclofenac Sodium, Dulaglutide, Insulin Lispro, Pen Needles, Pro Comfort Lancets 31G, acetaminophen, apixaban, glucose blood, insulin aspart, insulin detemir, lisinopril, meloxicam, metFORMIN, metoprolol succinate XL, and simvastatin    Allergies:  Allergies   Allergen Reactions   • Adhesive Tape Rash       Review of Systems  CONSTITUTIONAL:  No recent weight changes, fevers, night sweats or fatigue.  DERMATOLOGIC: No lesions, rashes, ecchymosis  HEENT: Eyes: No change in vision, no drainage, no discharge  ENT: No dysphagia, no hoarseness, no throat pain  CARDIOVASCULAR: No chest pain, chest pressure, palpitations.  RESPIRATORY: No wheezing, coughing, shortness of breath  GASTROINTESTINAL: No nausea or vomiting and no abdominal pain or diarrhea.  GENITOURINARY: No frequency, burning or flank pain and no vaginal symptoms.  MUSCULOSKELETAL: No joint pain, erythema, edema.  ENDOCRINE: No cold or heat intolerance or excessive thirst or urination.  HEMATOLOGIC: No excessive bleeding or clotting.  NEUROLOGIC: No headaches, vision changes, dizziness, fainting or numbness, tingling or weakness.    Objective   Objective     Vitals:   Temp:  [97.8 °F (36.6 °C)-98.2 °F (36.8 °C)] 97.8 °F (36.6 °C)  Heart Rate:  [98-99] 98  Resp:  [18] 18  BP: (130-141)/(74-83) 141/83    Physical Exam   Constitutional: Awake, sitting up in bed, appears chronically ill, morbidly obese  HEENT:  PERRLA, EOMI; No Scleral icterus  Neck:  Supple; No Mass, No  Lymphadenopathy  Cardiovascular:  No Rubs, No Edema, No JVD  Respiratory: No respiratory distress, unlabored breathing, saturating well on room air  Abdomen:  Normal BS all 4 Quadrants; No Guarding, No Tenderness  Musculoskeletal: Right lower extremity notable for transmetatarsal amputation, status post debridement with pain discoloration, foul odor, drainage, left plantar ulcer appears deep, drainage, no surrounding erythema   Neurological: Follows commands, answers questions appropriately, no slurred speech  Skin: For full description of patient's wounds please refer to media section of his chart    Result Review    Result Review:  I have personally reviewed the results from the time of this admission to 4/3/2023 15:21 EDT and agree with these findings:  []  Laboratory  []  Microbiology  []  Radiology  []  EKG/Telemetry   []  Cardiology/Vascular   []  Pathology  []  Old records  []  Other:      Assessment & Plan   Assessment / Plan     Assessment:  Nonhealing transmetatarsal amputation of the right foot with osteomyelitis  Paroxysmal atrial fibrillation on Eliquis  Essential pretension  Type 2 diabetes mellitus  Essential hypertension  Hyperlipidemia  Morbid obesity, BMI 49.3  Anxiety/depression    Plan:   Admit for further observation  Patient has nonhealing transmetatarsal amputation of the right foot status post debridement at Dr. Canales's office on 4/3/2023, patient's left heel was also debrided  Will start antibiotics with vancomycin and cefepime, de-escalate pending culture data  Additionally will obtain blood cultures  Check CBC, CMP, exam, phosphorus, ESR, CRP  Cultures obtained from wound care, will continue to follow-up and tailor antibiotics pending results  Obtain MRI of the right foot and left foot, patient had previous evidence of osteomyelitis in the right foot on prior MRI, suspect this is still the case  Patient will likely need further amputation as his wound is not healing, will await imaging and  likely consult vascular surgery for possible amputation  Added Percocet, morphine as needed for pain relief  We will place on remote telemetry, patient on Eliquis as outpatient, will start full dose Lovenox for now as he will more likely require surgical intervention, restart Eliquis when appropriate   Patient is a lot of social barriers given his history of felony conviction and being unable to be placed in a rehab for which he really needs.   Patient management consulted as we will try to see if there are any resources available for the patient, however in the past this has not been the case  Reviewed CBC, CMP, magnesium, phosphorus  Further recommendations pending, course    Discussed with RN.    DVT prophylaxis:  Mechanical DVT prophylaxis orders are present.    CODE STATUS:    Code Status (Patient has no pulse and is not breathing): CPR (Attempt to Resuscitate)  Medical Interventions (Patient has pulse or is breathing): Full Support      Electronically signed by Omar Juarez DO, 04/03/23, 3:21 PM EDT.

## 2023-04-03 NOTE — PROGRESS NOTES
Direct admit from wound care clinic by Dr. Canales.  Patient has chronic right foot infection since right transmetatarsal amputation which has not healed.  Patient has recently finished course of Augmentin.  Right foot has gotten worse with redness, swelling and malodor.  Also has wound on the left foot.  Cultures obtained at the wound care clinic today.  Direct admission requested to hospitalist service.  Patient has history of diabetes mellitus.  Apparently patient is a convicted felon and rehab would not take him for wound care.  Patient has social issues with no family member around in this area causing wound not to heal.  Apparently a BKA was suggested in the past.  Per Dr. Canales no vascular compromise in right leg.  Patient has seen vascular and had CTA last year.  Patient needs IV antibiotics and MRI of the right foot.    Electronically signed by Shekhar Au MD, 04/03/23, 10:55 AM EDT.

## 2023-04-03 NOTE — PROGRESS NOTES
Chief Complaint  Wound Check (BILATERAL FOOT ULCERS (BS: 157))    Subjective      Wound Check        Jose Shaikh  is a 62 y.o. diabetic male with a right MTT amputation that is healing by secondary intention.  He also has a wound on the left plantar foot.  He has a difficult social situation due to financial constraints and housing issues.  Patient has a remote prior felony conviction which makes finding him housing or outpatient rehab placement challenging as most facilities turn him down.    He was recently admitted to the hospital from 10/01 to 10/05/2022 for cellulitis and osteomyelitis of the right foot.  MRI revealed osteomyelitis of bones of the midfoot.  CTA showed two-vessel runoff to the foot.  Dr. Fuller did not feel that there was any vascular intervention that would help the patient heal better and that he would most likely need to proceed to a right sided BKA.  Patient declined.  He got 4 weeks of doxycycline and Augmentin.  He has previously undergone hyperbaric oxygen therapy and did not want to do so again.    Patient's housing issues have been addressed and resolved and he now has his own apartment.    He is using dry Aquacel Ag to the wounds.  Unfortunately the patient was not feeling well and went to the ED twice last week.  As such he has not felt like doing anything and has not changed his dressing in 4 days.  He does have home health twice a week for assistance.    He is having increasing pain in both feet and still in general does not feel well but has not had any fevers, body aches, nausea, vomiting, or abdominal pain.  No URI symptoms.  He just does not have any energy and does not feel like doing anything.      Patient recently finished a 2-week course of doxycycline and Augmentin less than a week ago.    Allergies:  Adhesive tape      Current Outpatient Medications:   •  acetaminophen (TYLENOL) 650 MG 8 hr tablet, Take 1 tablet by mouth Every 8 (Eight) Hours As Needed for Mild Pain.,  Disp: , Rfl:   •  apixaban (ELIQUIS) 5 MG tablet tablet, Take 1 tablet by mouth Every 12 (Twelve) Hours., Disp: 180 tablet, Rfl: 1  •  Diclofenac Sodium (VOLTAREN) 1 % gel gel, , Disp: , Rfl:   •  glucose blood test strip, Test blood sugar 3 times a day, Disp: 100 each, Rfl: 5  •  insulin aspart (NovoLOG) 100 UNIT/ML injection, Inject 20 Units under the skin into the appropriate area as directed 3 (Three) Times a Day Before Meals. Take up to 20 units as instructed, Disp: 20 mL, Rfl: 3  •  insulin detemir (Levemir FlexTouch) 100 UNIT/ML injection, Inject 60 Units under the skin into the appropriate area as directed Daily., Disp: 60 mL, Rfl: 1  •  Insulin Lispro (HumaLOG KwikPen) 200 UNIT/ML solution pen-injector, Inject 30 Units under the skin into the appropriate area as directed 3 (Three) Times a Day Before Meals., Disp: 45 mL, Rfl: 1  •  Insulin Pen Needle (Pen Needles) 32G X 5 MM misc, 1 each 4 (Four) Times a Day., Disp: 400 each, Rfl: 1  •  lisinopril (PRINIVIL,ZESTRIL) 20 MG tablet, Take 1 tablet by mouth Daily., Disp: 90 tablet, Rfl: 1  •  meloxicam (MOBIC) 7.5 MG tablet, Take 2 tablets by mouth Daily., Disp: 90 tablet, Rfl: 1  •  metFORMIN (GLUCOPHAGE) 1000 MG tablet, Take 1 tablet by mouth 2 (Two) Times a Day With Meals., Disp: 360 tablet, Rfl: 1  •  metoprolol succinate XL (TOPROL-XL) 50 MG 24 hr tablet, Take 1 tablet by mouth Daily., Disp: 90 tablet, Rfl: 1  •  Pro Comfort Lancets 31G misc, , Disp: , Rfl:   •  simvastatin (ZOCOR) 20 MG tablet, Take 1 tablet by mouth Every Night., Disp: 90 tablet, Rfl: 1  •  Trulicity 1.5 MG/0.5ML solution pen-injector, , Disp: , Rfl:     Past Medical History:   Diagnosis Date   • Absence of toe of right foot    • Acute osteomyelitis of left calcaneus  8/18/2021   • Anxiety and depression    • Arthritis    • Claustrophobia    • Corns and callus    • Diabetic ulcer of left heel associated with type 2 DM 8/18/2021   • Diabetic ulcer of left heel associated with type 2 DM  2021   • Diabetic ulcer of right midfoot associated with type 2 DM 2021   • Difficulty walking    • Essential hypertension 2021   • Hammertoe    • Hyperlipidemia LDL goal <100 2021   • Ingrown toenail    • Obesity    • Paroxysmal atrial fibrillation 2021   • Polyneuropathy    • Pressure ulcer, stage 1    • Tinea unguium    • Type 2 diabetes mellitus with polyneuropathy      Past Surgical History:   Procedure Laterality Date   • CYST REMOVAL      center of back; benign   • INCISION AND DRAINAGE ABSCESS      back   • INCISION AND DRAINAGE LEG Right 12/10/2021    Procedure: INCISION AND DRAINAGE LOWER EXTREMITY;  Surgeon: Ash Leyva DPM;  Location: [facility] MAIN OR;  Service: Podiatry;  Laterality: Right;   • OTHER SURGICAL HISTORY      Surgical clips left foot   • TOE SURGERY Right     Removal of 5th toe   • TRANS METATARSAL AMPUTATION Right 2021    Procedure: AMPUTATION TRANS METATARSAL;  Surgeon: Ash Leyva DPM;  Location: [facility] MAIN OR;  Service: Podiatry;  Laterality: Right;   • WRIST SURGERY Left     repair of injury     Social History     Socioeconomic History   • Marital status:    Tobacco Use   • Smoking status: Former     Packs/day: 0.00     Years: 1.00     Pack years: 0.00     Types: Cigarettes     Quit date: 1991     Years since quittin.6   • Smokeless tobacco: Never   • Tobacco comments:     quit at age 30   Vaping Use   • Vaping Use: Never used   Substance and Sexual Activity   • Alcohol use: Yes     Comment: Rare   • Drug use: Yes     Types: Marijuana     Comment: Daily   • Sexual activity: Defer           Objective     Vitals:    23 0938   BP: 130/74   BP Location: Left arm   Patient Position: Sitting   Pulse: 99   Resp: 18   Temp: 98.2 °F (36.8 °C)   TempSrc: Temporal   PainSc: 10-Worst pain ever     There is no height or weight on file to calculate BMI.     STEFADI Fall Risk Assessment has not been completed.     Review of  Systems     ROS:  No fevers, chills, sweats, or body aches.     I have reviewed the HPI and ROS as documented by MA/RN. Shea Daniels MD    Physical Exam     NAD  Normocephalic, atraumatic  EOMI, no scleral icterus  No respiratory distress, no cough  AAOx3, pleasant, cooperative  LLE: Left plantar wound is much worse.  There is severe recurrent macerated thick rubbery callus of the periwound and the maceration extends diffusely around the wound, particularly on the plantar surface of the foot, which is new.  Patient continues to have severe tenderness along the lateral aspect of the left foot adjacent to the wound.  Mild redness, no purulence, fluctuance, or heat.  RLE: Right foot wound is much worse.  There is a severe odor and increased tissue destruction, periwound maceration, and size of the wound.  There is diffuse swelling and erythema of the entire right foot with severe TTP along the plantar midfoot and heel.  No palpable abscess, no purulent drainage.  Cultures obtained of both wounds.    See photos for details.    RLE:          LLE:                  Result Review :  The following data was reviewed by: Shea Daniels MD on 04/03/2023:    Prior notes and images.  Recent ED visit x2, CBC, CMP, lactic acid, right foot x-ray, LLE venous Duplex      Wound debridement  Performed by: Shea Daniels MD  Authorized by: Shea Daniels MD     Correct patient: Identification verified by two methods:     Verbally and Date of birth  Correct procedure/consent    Correct site/side    Correct equipment as applicable    How many wounds are you performing a debridement on?:  2  Wound 1:     Nursing Documentation:     Wound Location:  Right foot  Measurements:     The total amount debrided on this wound was over 20 cm2.     Provider Documentation    Debridement type:  Sharp/excisional    Betadine      Other:  Sterile pickups and 10 blade     None    Tissue debrided:  Large    Level removed:  Subcutaneous    Patient  tolerance:  Good    Hemostasis achieved by:  Direct pressure and Silver nitrate    Wound Bed Post Debridement: See Photo      Description:  56.09 cm²  Wound 2:     Wound Location:  Left foot   Measurements:    The total amount debrided on this wound was under 20 cm2.      Provider Documentation:     Debridement type:  Sharp/Excisional    Betadine      Other:  Sterile pickups and 10 blade     None    Tissue debrided:  Large    Level removed:  Skin    Patient tolerance:  Good    Hemostasis achieved by:  Silver Nitrate and Direct Pressure    Wound Bed Post Debridement: See Photo                       Assessment and Plan   Diagnoses and all orders for this visit:    1. Postoperative wound dehiscence, subsequent encounter (Primary)  -     Wound Culture - Wound, Foot, Left    2. Diabetic ulcer of left heel associated with type 2 diabetes mellitus, limited to breakdown of skin  -     Wound Culture - Wound, Foot, Right    3. Other chronic osteomyelitis, right ankle and foot    4. Wound infection    5. Cellulitis of right foot    6. Diabetic polyneuropathy associated with type 2 diabetes mellitus    7. Class 3 severe obesity due to excess calories with serious comorbidity and body mass index (BMI) of 45.0 to 49.9 in adult    8. High risk social situation    9. Status post amputation of right foot through metatarsal bone    Other orders  -     Wound debridement        Patient's wounds are both worse but the right foot in particular is swollen, erythematous, warm, and increasingly painful.  The wound is significantly worse and the patient is feeling poorly overall.  I feel that he requires admission to the hospital and I am concerned that he will end up needing a right BKA.  This was recommended to him previously but he declined which is understandable.  Unfortunately, we have essentially exhausted resources to try and get it to heal and the wound continues to get worse and he continues to get recurrent infections.    In  addition to a more thorough work-up and IV antibiotics, a repeat MRI should be performed, particularly of the right foot but may also be justified for the left foot.  He has had vascular surgery evaluation in the past and may need further evaluation in that regard as well.    Unfortunately, the patient also has significant social challenges in addition to his physical ailments.  He states that his prior felony conviction means that he is turned down for most housing options and at his last hospitalization they were unable to find him rehab placement as a result.  He is very discouraged and does not have consistent local help.  He does have family members that live out of state.    Patient discussed with Dr. Au, hospitalist, who accepts the patient for admission.    Recheck within 1-2 weeks of discharge from hospital.    Patient was given instructions and counseling regarding their condition or for health maintenance advice, as well as the wound care plan and recommendations. They understand and agree with the plan.  They will follow back up here in the clinic but are instructed to contact us in the interim should they have any new, returning, or worsening symptoms or concerns. Please see specific information pulled into the AVS if appropriate.     Dragon Dictation utilized for chart completion.      Follow Up   Return in about 2 weeks (around 4/17/2023).      Shea Daniels MD

## 2023-04-03 NOTE — PLAN OF CARE
Goal Outcome Evaluation:           Progress: no change   Patient arrived to the unit around 1430 today from wound care. Patient A&Ox4. VSS. IV antibiotics complete per MAR. Patient complained of pain once this shift and was medicated per MAR. No new orders at this time.

## 2023-04-04 LAB
ALBUMIN SERPL-MCNC: 3 G/DL (ref 3.5–5.2)
ALBUMIN/GLOB SERPL: 0.8 G/DL
ALP SERPL-CCNC: 170 U/L (ref 39–117)
ALT SERPL W P-5'-P-CCNC: 31 U/L (ref 1–41)
ANION GAP SERPL CALCULATED.3IONS-SCNC: 9.9 MMOL/L (ref 5–15)
AST SERPL-CCNC: 38 U/L (ref 1–40)
BASOPHILS # BLD AUTO: 0.04 10*3/MM3 (ref 0–0.2)
BASOPHILS NFR BLD AUTO: 0.7 % (ref 0–1.5)
BILIRUB SERPL-MCNC: 1 MG/DL (ref 0–1.2)
BUN SERPL-MCNC: 12 MG/DL (ref 8–23)
BUN/CREAT SERPL: 19.7 (ref 7–25)
CALCIUM SPEC-SCNC: 8.2 MG/DL (ref 8.6–10.5)
CHLORIDE SERPL-SCNC: 100 MMOL/L (ref 98–107)
CO2 SERPL-SCNC: 25.1 MMOL/L (ref 22–29)
CREAT SERPL-MCNC: 0.61 MG/DL (ref 0.76–1.27)
DEPRECATED RDW RBC AUTO: 50.7 FL (ref 37–54)
EGFRCR SERPLBLD CKD-EPI 2021: 107.3 ML/MIN/1.73
EOSINOPHIL # BLD AUTO: 0.16 10*3/MM3 (ref 0–0.4)
EOSINOPHIL NFR BLD AUTO: 2.9 % (ref 0.3–6.2)
ERYTHROCYTE [DISTWIDTH] IN BLOOD BY AUTOMATED COUNT: 17.7 % (ref 12.3–15.4)
GLOBULIN UR ELPH-MCNC: 3.7 GM/DL
GLUCOSE BLDC GLUCOMTR-MCNC: 179 MG/DL (ref 70–99)
GLUCOSE BLDC GLUCOMTR-MCNC: 188 MG/DL (ref 70–99)
GLUCOSE BLDC GLUCOMTR-MCNC: 200 MG/DL (ref 70–99)
GLUCOSE BLDC GLUCOMTR-MCNC: 215 MG/DL (ref 70–99)
GLUCOSE BLDC GLUCOMTR-MCNC: 233 MG/DL (ref 70–99)
GLUCOSE SERPL-MCNC: 189 MG/DL (ref 65–99)
HCT VFR BLD AUTO: 35.1 % (ref 37.5–51)
HGB BLD-MCNC: 11.4 G/DL (ref 13–17.7)
IMM GRANULOCYTES # BLD AUTO: 0.03 10*3/MM3 (ref 0–0.05)
IMM GRANULOCYTES NFR BLD AUTO: 0.5 % (ref 0–0.5)
LYMPHOCYTES # BLD AUTO: 1.31 10*3/MM3 (ref 0.7–3.1)
LYMPHOCYTES NFR BLD AUTO: 23.7 % (ref 19.6–45.3)
MAGNESIUM SERPL-MCNC: 1.8 MG/DL (ref 1.6–2.4)
MCH RBC QN AUTO: 25.6 PG (ref 26.6–33)
MCHC RBC AUTO-ENTMCNC: 32.5 G/DL (ref 31.5–35.7)
MCV RBC AUTO: 78.9 FL (ref 79–97)
MONOCYTES # BLD AUTO: 0.71 10*3/MM3 (ref 0.1–0.9)
MONOCYTES NFR BLD AUTO: 12.9 % (ref 5–12)
NEUTROPHILS NFR BLD AUTO: 3.27 10*3/MM3 (ref 1.7–7)
NEUTROPHILS NFR BLD AUTO: 59.3 % (ref 42.7–76)
NRBC BLD AUTO-RTO: 0 /100 WBC (ref 0–0.2)
PHOSPHATE SERPL-MCNC: 3 MG/DL (ref 2.5–4.5)
PLATELET # BLD AUTO: 148 10*3/MM3 (ref 140–450)
PMV BLD AUTO: 10.9 FL (ref 6–12)
POTASSIUM SERPL-SCNC: 4 MMOL/L (ref 3.5–5.2)
PROT SERPL-MCNC: 6.7 G/DL (ref 6–8.5)
RBC # BLD AUTO: 4.45 10*6/MM3 (ref 4.14–5.8)
SODIUM SERPL-SCNC: 135 MMOL/L (ref 136–145)
UFH PPP CHRO-ACNC: 0.56 IU/ML
VANCOMYCIN SERPL-MCNC: 12.05 MCG/ML (ref 5–40)
WBC NRBC COR # BLD: 5.52 10*3/MM3 (ref 3.4–10.8)

## 2023-04-04 PROCEDURE — 84100 ASSAY OF PHOSPHORUS: CPT | Performed by: STUDENT IN AN ORGANIZED HEALTH CARE EDUCATION/TRAINING PROGRAM

## 2023-04-04 PROCEDURE — 80202 ASSAY OF VANCOMYCIN: CPT | Performed by: STUDENT IN AN ORGANIZED HEALTH CARE EDUCATION/TRAINING PROGRAM

## 2023-04-04 PROCEDURE — 25010000002 ENOXAPARIN PER 10 MG: Performed by: STUDENT IN AN ORGANIZED HEALTH CARE EDUCATION/TRAINING PROGRAM

## 2023-04-04 PROCEDURE — 85520 HEPARIN ASSAY: CPT | Performed by: STUDENT IN AN ORGANIZED HEALTH CARE EDUCATION/TRAINING PROGRAM

## 2023-04-04 PROCEDURE — 97165 OT EVAL LOW COMPLEX 30 MIN: CPT

## 2023-04-04 PROCEDURE — 82962 GLUCOSE BLOOD TEST: CPT

## 2023-04-04 PROCEDURE — 80053 COMPREHEN METABOLIC PANEL: CPT | Performed by: STUDENT IN AN ORGANIZED HEALTH CARE EDUCATION/TRAINING PROGRAM

## 2023-04-04 PROCEDURE — 83735 ASSAY OF MAGNESIUM: CPT | Performed by: STUDENT IN AN ORGANIZED HEALTH CARE EDUCATION/TRAINING PROGRAM

## 2023-04-04 PROCEDURE — 25010000002 MORPHINE PER 10 MG: Performed by: STUDENT IN AN ORGANIZED HEALTH CARE EDUCATION/TRAINING PROGRAM

## 2023-04-04 PROCEDURE — 99232 SBSQ HOSP IP/OBS MODERATE 35: CPT | Performed by: INTERNAL MEDICINE

## 2023-04-04 PROCEDURE — 99221 1ST HOSP IP/OBS SF/LOW 40: CPT | Performed by: SURGERY

## 2023-04-04 PROCEDURE — 25010000002 VANCOMYCIN 5 G RECONSTITUTED SOLUTION: Performed by: STUDENT IN AN ORGANIZED HEALTH CARE EDUCATION/TRAINING PROGRAM

## 2023-04-04 PROCEDURE — 25010000002 CEFEPIME PER 500 MG: Performed by: STUDENT IN AN ORGANIZED HEALTH CARE EDUCATION/TRAINING PROGRAM

## 2023-04-04 PROCEDURE — 85025 COMPLETE CBC W/AUTO DIFF WBC: CPT | Performed by: STUDENT IN AN ORGANIZED HEALTH CARE EDUCATION/TRAINING PROGRAM

## 2023-04-04 PROCEDURE — 63710000001 INSULIN LISPRO (HUMAN) PER 5 UNITS: Performed by: STUDENT IN AN ORGANIZED HEALTH CARE EDUCATION/TRAINING PROGRAM

## 2023-04-04 RX ORDER — VANCOMYCIN 2 GRAM/500 ML IN 0.9 % SODIUM CHLORIDE INTRAVENOUS
2000 EVERY 12 HOURS
Status: DISCONTINUED | OUTPATIENT
Start: 2023-04-04 | End: 2023-04-05

## 2023-04-04 RX ADMIN — INSULIN LISPRO 2 UNITS: 100 INJECTION, SOLUTION INTRAVENOUS; SUBCUTANEOUS at 18:20

## 2023-04-04 RX ADMIN — INSULIN LISPRO 2 UNITS: 100 INJECTION, SOLUTION INTRAVENOUS; SUBCUTANEOUS at 21:19

## 2023-04-04 RX ADMIN — VANCOMYCIN HYDROCHLORIDE 1250 MG: 5 INJECTION, POWDER, LYOPHILIZED, FOR SOLUTION INTRAVENOUS at 06:05

## 2023-04-04 RX ADMIN — METOPROLOL SUCCINATE 50 MG: 50 TABLET, EXTENDED RELEASE ORAL at 08:27

## 2023-04-04 RX ADMIN — CEFEPIME HYDROCHLORIDE 1 G: 1 INJECTION, POWDER, FOR SOLUTION INTRAMUSCULAR; INTRAVENOUS at 00:12

## 2023-04-04 RX ADMIN — MORPHINE SULFATE 2 MG: 2 INJECTION, SOLUTION INTRAMUSCULAR; INTRAVENOUS at 20:36

## 2023-04-04 RX ADMIN — MORPHINE SULFATE 2 MG: 2 INJECTION, SOLUTION INTRAMUSCULAR; INTRAVENOUS at 12:36

## 2023-04-04 RX ADMIN — CEFEPIME HYDROCHLORIDE 1 G: 1 INJECTION, POWDER, FOR SOLUTION INTRAMUSCULAR; INTRAVENOUS at 08:44

## 2023-04-04 RX ADMIN — ENOXAPARIN SODIUM 170 MG: 100 INJECTION SUBCUTANEOUS at 08:43

## 2023-04-04 RX ADMIN — INSULIN LISPRO 4 UNITS: 100 INJECTION, SOLUTION INTRAVENOUS; SUBCUTANEOUS at 12:20

## 2023-04-04 RX ADMIN — ENOXAPARIN SODIUM 170 MG: 100 INJECTION SUBCUTANEOUS at 21:20

## 2023-04-04 RX ADMIN — DAKIN'S SOLUTION 0.125% (QUARTER STRENGTH): 0.12 SOLUTION at 21:20

## 2023-04-04 RX ADMIN — CEFEPIME HYDROCHLORIDE 2 G: 2 INJECTION, POWDER, FOR SOLUTION INTRAVENOUS at 18:20

## 2023-04-04 RX ADMIN — MORPHINE SULFATE 2 MG: 2 INJECTION, SOLUTION INTRAMUSCULAR; INTRAVENOUS at 02:40

## 2023-04-04 RX ADMIN — Medication 10 ML: at 21:56

## 2023-04-04 RX ADMIN — ATORVASTATIN CALCIUM 10 MG: 10 TABLET, FILM COATED ORAL at 08:28

## 2023-04-04 RX ADMIN — INSULIN LISPRO 4 UNITS: 100 INJECTION, SOLUTION INTRAVENOUS; SUBCUTANEOUS at 08:26

## 2023-04-04 RX ADMIN — MORPHINE SULFATE 2 MG: 2 INJECTION, SOLUTION INTRAMUSCULAR; INTRAVENOUS at 06:40

## 2023-04-04 RX ADMIN — DAKIN'S SOLUTION 0.125% (QUARTER STRENGTH): 0.12 SOLUTION at 10:13

## 2023-04-04 NOTE — PROGRESS NOTES
[facility]   Hospitalist Progress Note  Date: 2023  Patient Name: Jose Shaikh  : 1960  MRN: [id number]  Date of admission: 4/3/2023      Subjective   Subjective     Chief Complaint: bilateral feet wounds     Summary: 62 y.o. male with past medical history stable for previous history of osteomyelitis status post transmetatarsal amputation of the right foot, paroxysmal atrial fibrillation on Eliquis, essential hypertension, hyperlipidemia, depression, and anxiety who presents with chief complaint of worsening right foot infection.  Patient underwent previous transmetatarsal amputation      Interval Followup:   MRI reviewed.  I have consulted vascular as well as podiatry today given abnormal results of MRI and possible need for further amputation    Review of Systems   All systems were reviewed and negative except for: Bilateral foot wounds    Objective   Objective     Vitals:   Temp:  [97.8 °F (36.6 °C)-98.8 °F (37.1 °C)] 98 °F (36.7 °C)  Heart Rate:  [68-99] 68  Resp:  [16-18] 16  BP: (104-141)/(57-83) 121/59  Physical Exam    Constitutional: Awake, alert, no acute distress, resting in bed watching TV   Eyes: Pupils equal, sclerae anicteric, no conjunctival injection   HENT: NCAT, mucous membranes moist   Neck: Supple, no thyromegaly, no lymphadenopathy, trachea midline   Respiratory: Clear to auscultation bilaterally, nonlabored respirations    Cardiovascular: RRR, no murmurs, rubs, or gallops, palpable pedal pulses bilaterally   Gastrointestinal: Positive bowel sounds, soft, nontender, nondistended   Musculoskeletal: No bilateral ankle edema, no clubbing or cyanosis to extremities   Psychiatric: Appropriate affect, cooperative   Neurologic: Oriented x 3, strength symmetric in all extremities, Cranial Nerves grossly intact to confrontation, speech clear   Skin: No rashes.  Bilateral foot room wounds. See media section of chat     Result Review    Result Review:  I have personally reviewed the  results from the time of this admission to 4/4/2023 09:22 EDT and agree with these findings:  [x]  Laboratory  CBC    CBC 3/27/23 3/27/23 4/3/23 4/4/23    1001 1904     WBC 6.08 5.80 6.61 5.52   RBC 4.44 4.71 4.59 4.45   Hemoglobin 11.4 (A) 12.1 (A) 11.8 (A) 11.4 (A)   Hematocrit 35.9 (A) 37.3 (A) 36.7 (A) 35.1 (A)   MCV 80.9 79.2 80.0 78.9 (A)   MCH 25.7 (A) 25.7 (A) 25.7 (A) 25.6 (A)   MCHC 31.8 32.4 32.2 32.5   RDW 19.3 (A) 18.5 (A) 18.1 (A) 17.7 (A)   Platelets 96 (A) 110 (A) 132 (A) 148   (A) Abnormal value            BMP    BMP 3/27/23 3/27/23 4/3/23 4/4/23    1001 1904     BUN 13 13 12 12   Creatinine 0.49 (A) 0.68 (A) 0.68 (A) 0.61 (A)   Sodium 138 132 (A) 134 (A) 135 (A)   Potassium 3.5 4.4 3.9 4.0   Chloride 107 97 (A) 99 100   CO2 22.2 24.8 23.1 25.1   Calcium 7.1 (A) 9.0 8.4 (A) 8.2 (A)   (A) Abnormal value              [x]  Microbiology   No results found for: ACANTHNAEG, AFBCX, BPERTUSSISCX, BLOODCX  No results found for: BCIDPCR, CXREFLEX, CSFCX, CULTURETIS  No results found for: CULTURES, HSVCX, URCX  No results found for: EYECULTURE, GCCX, HSVCULTURE, LABHSV  No results found for: LEGIONELLA, MRSACX, MUMPSCX, MYCOPLASCX  No results found for: NOCARDIACX, STOOLCX  No results found for: THROATCX, UNSTIMCULT, URINECX, CULTURE, VZVCULTUR  Wound Culture   Date Value Ref Range Status   04/03/2023 Growth present, too young to evaluate  Preliminary       [x]  Radiology    MRI of left foot  1. Limited examination, as above   2. Soft tissue ulceration along the heel pad   3. 2.3 cm x 1.6 cm T2 high signal focus medial to the calcaneal cuboid joint.  This may represent a   dilated venous varix or sterile fluid collection.  Abscess is considered relatively unlikely   secondary to the lack of surrounding inflammatory change   4. Mild soft tissue edema noted elsewhere in the ankle probably representing dependent edema.   5. Edema in the calcaneal body could be secondary to osteomyelitis or reactive in nature.   The   amount of edema here appears mildly less prominent than on the 8/11/2021 exam.   6. Postsurgical changes in the posterior calcaneus                Slava Galvan M.D.         Electronically Signed and Approved By: Slvaa Galvan M.D. on 4/04/2023 at 10:34      MRI of right foot     Impression:       1. 1.3 cm suspected wire fragment imbedded in the medial aspect of the heel pad   2. Previous amputation of the forefoot.  Osseous edema in the adjacent navicular, cuneiform and   cuboid appears worse in the interval, suspected to represent osteomyelitis.   3. Mild edema in the distal fibula, worse in the interval.  This edema may be reactive given the   location.   4. Soft tissue edema in the visualized lower extremity could represent cellulitis and or dependent   edema.   5. No soft tissue abscess identified                Slava Galvan M.D.         Electronically Signed and Approved By: Slava Galvan M.D. on 4/04/2023 at 10:22               []  EKG/Telemetry   []  Cardiology/Vascular   []  Pathology  []  Old records  []  Other:    Assessment & Plan   Assessment / Plan     Assessment/Plan:  Assessment:  Nonhealing transmetatarsal amputation of the right foot with osteomyelitis  Paroxysmal atrial fibrillation on Eliquis  Essential pretension  Type 2 diabetes mellitus  Essential hypertension  Hyperlipidemia  Morbid obesity, BMI 49.3  Anxiety/depression     Plan:   Consulted Vascular and Podiatry   Patient has nonhealing transmetatarsal amputation of the right foot status post debridement at Dr. Canales's office on 4/3/2023, patient's left heel was also debrided  Continue antibiotics with vancomycin and cefepime, de-escalate pending culture data  Blood cultures are negative so far   MRI shown as above   Continue on remote telemetry, patient on Eliquis as outpatient, will start full dose Lovenox for now as he will more likely require surgical intervention, restart Eliquis when appropriate   Patient is a lot of social  barriers given his history of felony conviction (sex offender although patient denies any wrongdoing) and being unable to be placed in a rehab for which he really needs.        Discussed plan with RN.    DVT prophylaxis:  Medical and mechanical DVT prophylaxis orders are present.    CODE STATUS:   Code Status (Patient has no pulse and is not breathing): CPR (Attempt to Resuscitate)  Medical Interventions (Patient has pulse or is breathing): Full Support        Electronically signed by Bandar Sheth DO, 04/04/23, 9:22 AM EDT.

## 2023-04-04 NOTE — PLAN OF CARE
Goal Outcome Evaluation:  Plan of Care Reviewed With: patient        Progress: no change (first session: evaluation)  Outcome Evaluation: Patient presents with limitations of balance and endurance/ activity tolerance which are impacting ADL/transfer independence. Skilled OT is indicated to remediate/compensate for deficits to maximize independence and safety with functional tasks.

## 2023-04-04 NOTE — SIGNIFICANT NOTE
Wound Eval / Progress Noted    KEO Dominguez     Patient Name: Jose Shaikh  : 1960  MRN: [id number]  Today's Date: 2023                 Admit Date: 4/3/2023    Visit Dx:    ICD-10-CM ICD-9-CM   1. Decreased activities of daily living (ADL)  Z78.9 V49.89       Patient Active Problem List   Diagnosis   • Diabetic ulcer of left heel associated with type 2 DM   • Acute osteomyelitis of left calcaneus    • Diabetic ulcer of left heel associated with type 2 DM   • Diabetic ulcer of right midfoot associated with type 2 DM   • Paroxysmal atrial fibrillation   • Essential hypertension   • Hyperlipidemia LDL goal <100   • Cellulitis and abscess of foot   • High alkaline phosphatase level   • Osteomyelitis   • Onychomycosis   • Onychocryptosis   • Foot pain, bilateral   • Osteomyelitis of foot, right, acute   • Cellulitis of right foot   • Type 2 diabetes mellitus, with long-term current use of insulin   • Class 3 severe obesity due to excess calories with serious comorbidity and body mass index (BMI) of 45.0 to 49.9 in adult   • Anxiety disorder, unspecified   • Claustrophobia   • Dependence on wheelchair   • Depression, unspecified   • Long term (current) use of anticoagulants   • Long term (current) use of oral hypoglycemic drugs   • Wound of foot   • Non-prs chronic ulcer oth prt r foot limited to brkdwn skin   • Orthostatic hypotension   • Other chronic osteomyelitis, right ankle and foot   • Personal history of nicotine dependence   • Thrombocytopenia, unspecified   • Unspecified open wound, right foot, initial encounter   • Diabetic foot infection        Past Medical History:   Diagnosis Date   • Absence of toe of right foot    • Acute osteomyelitis of left calcaneus  2021   • Anxiety and depression    • Arthritis    • Claustrophobia    • Corns and callus    • Diabetic ulcer of left heel associated with type 2 DM 2021   • Diabetic ulcer of left heel associated with type 2 DM 2021   • Diabetic  ulcer of right midfoot associated with type 2 DM 8/18/2021   • Difficulty walking    • Essential hypertension 8/31/2021   • Hammertoe    • Hyperlipidemia LDL goal <100 8/31/2021   • Ingrown toenail    • Obesity    • Paroxysmal atrial fibrillation 8/31/2021   • Polyneuropathy    • Pressure ulcer, stage 1    • Tinea unguium    • Type 2 diabetes mellitus with polyneuropathy         Past Surgical History:   Procedure Laterality Date   • CYST REMOVAL      center of back; benign   • INCISION AND DRAINAGE ABSCESS      back   • INCISION AND DRAINAGE LEG Right 12/10/2021    Procedure: INCISION AND DRAINAGE LOWER EXTREMITY;  Surgeon: Ash Leyva DPM;  Location: [facility] MAIN OR;  Service: Podiatry;  Laterality: Right;   • OTHER SURGICAL HISTORY      Surgical clips left foot   • TOE SURGERY Right     Removal of 5th toe   • TRANS METATARSAL AMPUTATION Right 12/2/2021    Procedure: AMPUTATION TRANS METATARSAL;  Surgeon: Ash Leyva DPM;  Location: [facility] MAIN OR;  Service: Podiatry;  Laterality: Right;   • WRIST SURGERY Left     repair of injury         Physical Assessment:  Wound 06/22/21 1133 Left lateral heel (Active)   Wound Image   04/03/23 1503   Dressing Appearance dry;intact 04/04/23 1129   Closure None 04/04/23 1129   Base moist;red;pink 04/04/23 1129   Periwound intact 04/04/23 1129   Periwound Temperature warm 04/04/23 1129   Periwound Skin Turgor soft 04/04/23 1129   Edges open;callused 04/04/23 1129   Wound Length (cm) 2.3 cm 04/04/23 1129   Wound Width (cm) 1.7 cm 04/04/23 1129   Wound Depth (cm) 1.1 cm 04/04/23 1129   Wound Surface Area (cm^2) 3.91 cm^2 04/04/23 1129   Wound Volume (cm^3) 4.301 cm^3 04/04/23 1129   Drainage Characteristics/Odor serosanguineous 04/04/23 1129   Drainage Amount small 04/04/23 1129   Care, Wound cleansed with;irrigated with;sterile normal saline 04/04/23 1129   Dressing Care dressing applied;dressing moistened;antimicrobial agent applied;gauze,  wet-to-moist;abdominal pad;gauze, dry 04/04/23 1129   Periwound Care absorptive dressing applied 04/04/23 1129       Wound 12/02/21 Right anterior foot Incision (Active)   Wound Image   04/03/23 1502   Dressing Appearance dry;intact 04/04/23 1129   Closure None 04/04/23 1129   Base moist;red;slough;gray 04/04/23 1129   Periwound dry;intact 04/04/23 1129   Periwound Temperature warm 04/04/23 1129   Periwound Skin Turgor soft 04/04/23 1129   Edges open 04/04/23 1129   Wound Length (cm) 7.1 cm 04/04/23 1129   Wound Width (cm) 6 cm 04/04/23 1129   Wound Depth (cm) 0.8 cm 04/04/23 1129   Wound Surface Area (cm^2) 42.6 cm^2 04/04/23 1129   Wound Volume (cm^3) 34.08 cm^3 04/04/23 1129   Drainage Characteristics/Odor serosanguineous;yellow 04/04/23 1129   Drainage Amount small 04/04/23 1129   Care, Wound cleansed with;sterile normal saline 04/04/23 1129   Dressing Care dressing applied;dressing moistened;antimicrobial agent applied;gauze, wet-to-moist;abdominal pad;gauze, dry 04/04/23 1129   Periwound Care absorptive dressing applied 04/04/23 1129      Wound Check / Follow-up:  Patient seen today for wound consult. Patient with chronic wounds to bilateral feet. He is established at the wound care clinic. Postoperative wound dehiscence to right foot and diabetic ulcer of left heel per wound physician. Wound base to right foot with red, moist tissue. There is area of gray slough that depresses to 0.8cm of depth. Cleansed normal saline and gauze. Applied Dakin's moistened fluffed gauze to wound bed, ABD pad, and secured with gauze roll. Left heel ulceration with moist red and pink tissue. Callused edges are noted. Cleansed and irrigated with normal saline and gauze. Filled wound bed with Dakin's moistened fluffed gauze, applied ABD pad, and secured with gauze roll. Recommending to continue current care.      Impression: Chronic ulcerations to bilateral feet.      Short term goals:  Regain skin integrity, skin protection, BID  dressing changes.    Dottie Guevara RN    4/4/2023    12:31 EDT

## 2023-04-04 NOTE — PLAN OF CARE
Goal Outcome Evaluation:           Progress: no change   Patient A&Ox4. VSS. IV antibiotics complete per MAR. Wound care complete per MAR. Patient complained of pain this shift, see MAR. No new orders at this time.

## 2023-04-04 NOTE — CONSULTS
[facility]   VASCULAR SURGERY CONSULT    Patient Name: Jose Shaikh  : 1960  MRN: [id number]  Primary Care Physician:  Delia Cervantes APRN  Date of admission: 4/3/2023    Subjective   Subjective     Chief Complaint: Osteomyelitis    HPI:    Jose Shaikh is a 62 y.o. male with chronic bilateral foot wounds.  He has been seen by our service in the past back in 2021 and again in 2022.  When I last saw him he started going to the wound center in has been going to the wound center since then but the wounds have failed to heal.  Back in October a CTA was obtained which revealed that there was adequate blood flow to both lower extremities.  I have been asked to evaluate for possible BKA due to concern for osteomyelitis in the MRI.    Review of Systems    Noncontributory except for the history of present illness.    Personal History     Past Medical History:   Diagnosis Date   • Absence of toe of right foot    • Acute osteomyelitis of left calcaneus  2021   • Anxiety and depression    • Arthritis    • Claustrophobia    • Corns and callus    • Diabetic ulcer of left heel associated with type 2 DM 2021   • Diabetic ulcer of left heel associated with type 2 DM 2021   • Diabetic ulcer of right midfoot associated with type 2 DM 2021   • Difficulty walking    • Essential hypertension 2021   • Hammertoe    • Hyperlipidemia LDL goal <100 2021   • Ingrown toenail    • Obesity    • Paroxysmal atrial fibrillation 2021   • Polyneuropathy    • Pressure ulcer, stage 1    • Tinea unguium    • Type 2 diabetes mellitus with polyneuropathy        Past Surgical History:   Procedure Laterality Date   • CYST REMOVAL      center of back; benign   • INCISION AND DRAINAGE ABSCESS      back   • INCISION AND DRAINAGE LEG Right 12/10/2021    Procedure: INCISION AND DRAINAGE LOWER EXTREMITY;  Surgeon: Ash Leyva DPM;  Location: [facility] MAIN OR;  Service: Podiatry;   Laterality: Right;   • OTHER SURGICAL HISTORY      Surgical clips left foot   • TOE SURGERY Right     Removal of 5th toe   • TRANS METATARSAL AMPUTATION Right 12/2/2021    Procedure: AMPUTATION TRANS METATARSAL;  Surgeon: Ash Leyva DPM;  Location: [facility] OR;  Service: Podiatry;  Laterality: Right;   • WRIST SURGERY Left     repair of injury       Family History: family history includes Cancer in his father; Heart disease in his brother, father, and mother. Otherwise pertinent FHx was reviewed and not pertinent to current issue.    Social History:  reports that he quit smoking about 31 years ago. His smoking use included cigarettes. He has never used smokeless tobacco. He reports current alcohol use. He reports current drug use. Drug: Marijuana.    Home Medications:  Diclofenac Sodium, Dulaglutide, Insulin Lispro, Pen Needles, Pro Comfort Lancets 31G, acetaminophen, apixaban, glucose blood, insulin detemir, lisinopril, meloxicam, metFORMIN, metoprolol succinate XL, and simvastatin      Allergies:  Allergies   Allergen Reactions   • Adhesive Tape Rash       Objective   Objective     Vitals:   Temp:  [97.6 °F (36.4 °C)-98.8 °F (37.1 °C)] 98.4 °F (36.9 °C)  Heart Rate:  [66-98] 66  Resp:  [16-18] 16  BP: (101-141)/(57-83) 103/58    Physical Exam   General: Alert, no acute distress, obese.  Neck: Supple  Heart: Regular rate  Lungs: Clear  Abdomen: Benign  Extremities: Symmetric.  Dressed.  Photographs of the wounds of both lower extremities have been reviewed.    Diagnostic studies:  MRIs during this admission of both feet reveal findings that could represent osteomyelitis.    Assessment & Plan   Assessment / Plan     Active Hospital Problems:  Active Hospital Problems    Diagnosis    • **Diabetic foot infection        Assessment/plan:   Mr. Shaikh has nonhealing wounds in both feet.  This wounds have been present for a prolonged period of time and not able to heal.  He indicates that he is unable to  not walk as he lives by himself and there are certain activities that he needs to perform.  At some point in the past he had an intervention into one of his calcaneus, left side, and they were able to get wounds to heal.  He inquires as to whether this is an option at this time.  Furthermore when I discussed the MRI results with him I advised him that there is suspicion of osteomyelitis, I cannot definitively say that there is indeed osteomyelitis.  The possibility of a bone biopsy was brought up by me although I advised him that that would only be beneficial if he would want to proceed with an amputation should the biopsy show evidence of osteomyelitis.  In either case after our discussion the thought was that he would benefit from evaluation by podiatry.  I would also suggest the possibility of him wearing an offloading shoe.  I will follow peripherally.        Electronically signed by Jakob Fuller MD, 04/04/23, 5:08 PM EDT.

## 2023-04-04 NOTE — PROGRESS NOTES
"[facility] Clinical Pharmacy Services: Vancomycin Monitoring Note    Jose Shaikh is a 62 y.o. male who is on day  of pharmacy to dose vancomycin for Skin and Soft Tissue.    Previous Vancomycin Dose:   1250 mg IV every  12  hours  Imaging Reviewed?: Yes (possible presence of osteomyelitis on  MRI)  Updated Cultures and Sensitivities:   3/27 Blood cx: NGTD (2)  3/27 Influenza antigen: negative  3/27 COVID-19: not detected  3/27 Blood cx : NGTD ()  4/3 Wound cx: Few gram negative bacilli, few gram positive cocci in pair few gram positive bacilli, few WBC seen  4/3 MRSA PCR swab: No MRSA detected    Vitals/Labs  Ht: 185.4 cm (73\"); Wt: (!) 168 kg (370 lb)   Temp (24hrs), Av.1 °F (36.7 °C), Min:97.6 °F (36.4 °C), Max:98.8 °F (37.1 °C)   Estimated Creatinine Clearance: 199 mL/min (A) (by C-G formula based on SCr of 0.61 mg/dL (L)).       Results from last 7 days   Lab Units 23  0948 23  0448 23  0447 23  1602   VANCOMYCIN RM mcg/mL 12.05  --   --   --    CREATININE mg/dL  --   --  0.61* 0.68*   WBC 10*3/mm3  --  5.52  --  6.61     Assessment/Plan    Current Vancomycin Dose:  2000 mg IV every 12 hours; which provides the following predicted parameters:  AUC24,ss: 314 mg/L.hr  PAUC*: 1 %  Ctrough,ss: 3.3 mg/L  Pconc*: 4 %  Tox.: 3 %  **Pt previously on vancomycin 1250mg IV q12h.  Vancomycin level obtained @0948 (about 3.5 hours after dose) resulted at 12.05mcg/ml.  Due to obesity and likelihood of accumulation, will increase dose to vancomycin 2000mg IV q12 and reassess with repeat level. Current dosing provides low expected AUC, but likely does not take into account accumulation  - expect higher AUC and trough.   Next Vanc Random ordered for 4/5 at 1500  We will continue to monitor patient changes and renal function     Thank you for involving pharmacy in this patient's care. Please contact pharmacy with any questions or concerns.    Dinorah Valdez RPH  Clinical " Pharmacist

## 2023-04-04 NOTE — PLAN OF CARE
Goal Outcome Evaluation:      Patient is A&O x4. VSS. Medicated twice for pain on shift, see MAR. Wound care performed per order. No complaints from patient at this time. Will continue with the plan of care.

## 2023-04-05 LAB
ACETONE SERPL-MCNC: <.01 G/DL (ref 0–0.01)
ANION GAP SERPL CALCULATED.3IONS-SCNC: 6.2 MMOL/L (ref 5–15)
BUN SERPL-MCNC: 11 MG/DL (ref 8–23)
BUN/CREAT SERPL: 19 (ref 7–25)
BUTALBITAL SERPL-MCNC: <1 UG/ML (ref 1–10)
CALCIUM SPEC-SCNC: 8.3 MG/DL (ref 8.6–10.5)
CHLORDIAZEP SERPL-MCNC: <0.1 UG/ML (ref 0.1–0.9)
CHLORIDE SERPL-SCNC: 103 MMOL/L (ref 98–107)
CO2 SERPL-SCNC: 25.8 MMOL/L (ref 22–29)
CREAT SERPL-MCNC: 0.58 MG/DL (ref 0.76–1.27)
DEPRECATED RDW RBC AUTO: 50.2 FL (ref 37–54)
DIAZEPAM SERPL-MCNC: <0.1 UG/ML (ref 0.1–0.9)
EGFRCR SERPLBLD CKD-EPI 2021: 108.9 ML/MIN/1.73
ERYTHROCYTE [DISTWIDTH] IN BLOOD BY AUTOMATED COUNT: 17.2 % (ref 12.3–15.4)
ETHANOL BLD GC-MCNC: <.01 G/DL (ref 0–0.01)
GLUCOSE BLDC GLUCOMTR-MCNC: 168 MG/DL (ref 70–99)
GLUCOSE BLDC GLUCOMTR-MCNC: 193 MG/DL (ref 70–99)
GLUCOSE BLDC GLUCOMTR-MCNC: 216 MG/DL (ref 70–99)
GLUCOSE BLDC GLUCOMTR-MCNC: 231 MG/DL (ref 70–99)
GLUCOSE SERPL-MCNC: 170 MG/DL (ref 65–99)
HCT VFR BLD AUTO: 34.7 % (ref 37.5–51)
HGB BLD-MCNC: 11.3 G/DL (ref 13–17.7)
ISOPROPANOL SERPL-MCNC: <.01 G/DL (ref 0–0.01)
Lab: ABNORMAL
MAGNESIUM SERPL-MCNC: 1.8 MG/DL (ref 1.6–2.4)
MCH RBC QN AUTO: 25.9 PG (ref 26.6–33)
MCHC RBC AUTO-ENTMCNC: 32.6 G/DL (ref 31.5–35.7)
MCV RBC AUTO: 79.4 FL (ref 79–97)
METHANOL SERPL-MCNC: <.01 G/DL (ref 0–0.01)
NORCHLORDIAZEP SERPL-MCNC: <0.1 UG/ML (ref 0.1–0.6)
NORDIAZEPAM SERPL-MCNC: <0.1 UG/ML (ref 0.1–1.4)
PENTOBARB SERPL-MCNC: <1 UG/ML (ref 1–5)
PHENOBARB SERPL-MCNC: <1 UG/ML (ref 15–40)
PLATELET # BLD AUTO: 153 10*3/MM3 (ref 140–450)
PMV BLD AUTO: 11.8 FL (ref 6–12)
POTASSIUM SERPL-SCNC: 4.2 MMOL/L (ref 3.5–5.2)
RBC # BLD AUTO: 4.37 10*6/MM3 (ref 4.14–5.8)
SALICYLATES SERPL-MCNC: ABNORMAL UG/ML (ref 30–250)
SODIUM SERPL-SCNC: 135 MMOL/L (ref 136–145)
UFH PPP CHRO-ACNC: 0.75 IU/ML
VANCOMYCIN SERPL-MCNC: 8.8 MCG/ML (ref 5–40)
WBC NRBC COR # BLD: 4.69 10*3/MM3 (ref 3.4–10.8)

## 2023-04-05 PROCEDURE — 97162 PT EVAL MOD COMPLEX 30 MIN: CPT

## 2023-04-05 PROCEDURE — 25010000002 CEFEPIME PER 500 MG: Performed by: STUDENT IN AN ORGANIZED HEALTH CARE EDUCATION/TRAINING PROGRAM

## 2023-04-05 PROCEDURE — 83735 ASSAY OF MAGNESIUM: CPT | Performed by: INTERNAL MEDICINE

## 2023-04-05 PROCEDURE — 99231 SBSQ HOSP IP/OBS SF/LOW 25: CPT | Performed by: SURGERY

## 2023-04-05 PROCEDURE — 99232 SBSQ HOSP IP/OBS MODERATE 35: CPT | Performed by: PODIATRIST

## 2023-04-05 PROCEDURE — 85027 COMPLETE CBC AUTOMATED: CPT | Performed by: INTERNAL MEDICINE

## 2023-04-05 PROCEDURE — 80048 BASIC METABOLIC PNL TOTAL CA: CPT | Performed by: INTERNAL MEDICINE

## 2023-04-05 PROCEDURE — 97110 THERAPEUTIC EXERCISES: CPT

## 2023-04-05 PROCEDURE — 82962 GLUCOSE BLOOD TEST: CPT

## 2023-04-05 PROCEDURE — 80202 ASSAY OF VANCOMYCIN: CPT | Performed by: STUDENT IN AN ORGANIZED HEALTH CARE EDUCATION/TRAINING PROGRAM

## 2023-04-05 PROCEDURE — 85520 HEPARIN ASSAY: CPT | Performed by: STUDENT IN AN ORGANIZED HEALTH CARE EDUCATION/TRAINING PROGRAM

## 2023-04-05 PROCEDURE — 25010000002 MORPHINE PER 10 MG: Performed by: STUDENT IN AN ORGANIZED HEALTH CARE EDUCATION/TRAINING PROGRAM

## 2023-04-05 PROCEDURE — 97161 PT EVAL LOW COMPLEX 20 MIN: CPT

## 2023-04-05 PROCEDURE — 25010000002 VANCOMYCIN 5 G RECONSTITUTED SOLUTION: Performed by: STUDENT IN AN ORGANIZED HEALTH CARE EDUCATION/TRAINING PROGRAM

## 2023-04-05 PROCEDURE — 63710000001 INSULIN LISPRO (HUMAN) PER 5 UNITS: Performed by: STUDENT IN AN ORGANIZED HEALTH CARE EDUCATION/TRAINING PROGRAM

## 2023-04-05 PROCEDURE — 99232 SBSQ HOSP IP/OBS MODERATE 35: CPT | Performed by: INTERNAL MEDICINE

## 2023-04-05 PROCEDURE — 25010000002 ENOXAPARIN PER 10 MG: Performed by: STUDENT IN AN ORGANIZED HEALTH CARE EDUCATION/TRAINING PROGRAM

## 2023-04-05 RX ADMIN — INSULIN LISPRO 2 UNITS: 100 INJECTION, SOLUTION INTRAVENOUS; SUBCUTANEOUS at 17:48

## 2023-04-05 RX ADMIN — INSULIN LISPRO 4 UNITS: 100 INJECTION, SOLUTION INTRAVENOUS; SUBCUTANEOUS at 12:24

## 2023-04-05 RX ADMIN — INSULIN LISPRO 2 UNITS: 100 INJECTION, SOLUTION INTRAVENOUS; SUBCUTANEOUS at 08:24

## 2023-04-05 RX ADMIN — MORPHINE SULFATE 2 MG: 2 INJECTION, SOLUTION INTRAMUSCULAR; INTRAVENOUS at 15:50

## 2023-04-05 RX ADMIN — METOPROLOL SUCCINATE 50 MG: 50 TABLET, EXTENDED RELEASE ORAL at 08:24

## 2023-04-05 RX ADMIN — SENNOSIDES AND DOCUSATE SODIUM 2 TABLET: 8.6; 5 TABLET ORAL at 20:20

## 2023-04-05 RX ADMIN — MORPHINE SULFATE 2 MG: 2 INJECTION, SOLUTION INTRAMUSCULAR; INTRAVENOUS at 08:24

## 2023-04-05 RX ADMIN — Medication 10 ML: at 09:00

## 2023-04-05 RX ADMIN — CEFEPIME HYDROCHLORIDE 2 G: 2 INJECTION, POWDER, FOR SOLUTION INTRAVENOUS at 01:09

## 2023-04-05 RX ADMIN — DAKIN'S SOLUTION 0.125% (QUARTER STRENGTH): 0.12 SOLUTION at 10:00

## 2023-04-05 RX ADMIN — CEFEPIME HYDROCHLORIDE 2 G: 2 INJECTION, POWDER, FOR SOLUTION INTRAVENOUS at 10:05

## 2023-04-05 RX ADMIN — CEFEPIME HYDROCHLORIDE 2 G: 2 INJECTION, POWDER, FOR SOLUTION INTRAVENOUS at 17:48

## 2023-04-05 RX ADMIN — INSULIN LISPRO 4 UNITS: 100 INJECTION, SOLUTION INTRAVENOUS; SUBCUTANEOUS at 20:20

## 2023-04-05 RX ADMIN — DAKIN'S SOLUTION 0.125% (QUARTER STRENGTH): 0.12 SOLUTION at 21:39

## 2023-04-05 RX ADMIN — MORPHINE SULFATE 2 MG: 2 INJECTION, SOLUTION INTRAMUSCULAR; INTRAVENOUS at 20:20

## 2023-04-05 RX ADMIN — VANCOMYCIN 2 GRAM/500 ML IN 0.9 % SODIUM CHLORIDE INTRAVENOUS 2000 MG: at 05:40

## 2023-04-05 RX ADMIN — ENOXAPARIN SODIUM 170 MG: 100 INJECTION SUBCUTANEOUS at 08:24

## 2023-04-05 RX ADMIN — ATORVASTATIN CALCIUM 10 MG: 10 TABLET, FILM COATED ORAL at 08:24

## 2023-04-05 RX ADMIN — Medication 10 ML: at 20:20

## 2023-04-05 RX ADMIN — MORPHINE SULFATE 2 MG: 2 INJECTION, SOLUTION INTRAMUSCULAR; INTRAVENOUS at 03:23

## 2023-04-05 RX ADMIN — ENOXAPARIN SODIUM 170 MG: 100 INJECTION SUBCUTANEOUS at 21:39

## 2023-04-05 NOTE — THERAPY EVALUATION
Acute Care - Physical Therapy Initial Evaluation   [facility]     Patient Name: Jose Shaikh  : 1960  MRN: [id number]  Today's Date: 2023      Visit Dx:     ICD-10-CM ICD-9-CM   1. Decreased activities of daily living (ADL)  Z78.9 V49.89   2. Difficulty walking  R26.2 719.7     Patient Active Problem List   Diagnosis   • Diabetic ulcer of left heel associated with type 2 DM   • Acute osteomyelitis of left calcaneus    • Diabetic ulcer of left heel associated with type 2 DM   • Diabetic ulcer of right midfoot associated with type 2 DM   • Paroxysmal atrial fibrillation   • Essential hypertension   • Hyperlipidemia LDL goal <100   • Cellulitis and abscess of foot   • High alkaline phosphatase level   • Osteomyelitis   • Onychomycosis   • Onychocryptosis   • Foot pain, bilateral   • Osteomyelitis of foot, right, acute   • Cellulitis of right foot   • Type 2 diabetes mellitus, with long-term current use of insulin   • Class 3 severe obesity due to excess calories with serious comorbidity and body mass index (BMI) of 45.0 to 49.9 in adult   • Anxiety disorder, unspecified   • Claustrophobia   • Dependence on wheelchair   • Depression, unspecified   • Long term (current) use of anticoagulants   • Long term (current) use of oral hypoglycemic drugs   • Wound of foot   • Non-prs chronic ulcer oth prt r foot limited to brkdwn skin   • Orthostatic hypotension   • Other chronic osteomyelitis, right ankle and foot   • Personal history of nicotine dependence   • Thrombocytopenia, unspecified   • Unspecified open wound, right foot, initial encounter   • Diabetic foot infection     Past Medical History:   Diagnosis Date   • Absence of toe of right foot    • Acute osteomyelitis of left calcaneus  2021   • Anxiety and depression    • Arthritis    • Claustrophobia    • Corns and callus    • Diabetic ulcer of left heel associated with type 2 DM 2021   • Diabetic ulcer of left heel associated with type 2 DM  7/6/2021   • Diabetic ulcer of right midfoot associated with type 2 DM 8/18/2021   • Difficulty walking    • Essential hypertension 8/31/2021   • Hammertoe    • Hyperlipidemia LDL goal <100 8/31/2021   • Ingrown toenail    • Obesity    • Paroxysmal atrial fibrillation 8/31/2021   • Polyneuropathy    • Pressure ulcer, stage 1    • Tinea unguium    • Type 2 diabetes mellitus with polyneuropathy      Past Surgical History:   Procedure Laterality Date   • CYST REMOVAL      center of back; benign   • INCISION AND DRAINAGE ABSCESS      back   • INCISION AND DRAINAGE LEG Right 12/10/2021    Procedure: INCISION AND DRAINAGE LOWER EXTREMITY;  Surgeon: Ash Leyva DPM;  Location: [facility] MAIN OR;  Service: Podiatry;  Laterality: Right;   • OTHER SURGICAL HISTORY      Surgical clips left foot   • TOE SURGERY Right     Removal of 5th toe   • TRANS METATARSAL AMPUTATION Right 12/2/2021    Procedure: AMPUTATION TRANS METATARSAL;  Surgeon: Ash Leyva DPM;  Location: [facility] MAIN OR;  Service: Podiatry;  Laterality: Right;   • WRIST SURGERY Left     repair of injury     PT Assessment (last 12 hours)     PT Evaluation and Treatment     Row Name 04/05/23 1500          Physical Therapy Time and Intention    Subjective Information complains of;fatigue;pain  -CS     Document Type evaluation  -CS     Mode of Treatment individual therapy;physical therapy  -CS     Patient Effort fair  -CS     Symptoms Noted During/After Treatment fatigue;increased pain  -CS     Row Name 04/05/23 1500          General Information    Patient Profile Reviewed yes  -CS     Patient Observations alert;cooperative  -CS     Prior Level of Function independent:;all household mobility;gait;transfer;bed mobility;ADL's  -CS     Equipment Currently Used at Home rollator;raised toilet;bath bench  -CS     Pertinent History of Current Functional Problem Pt with previous right transmet amputation with non-healing wounds  -CS     Existing  Precautions/Restrictions fall  non healing wounds in bilateral feet and lower calves  -CS     Barriers to Rehab none identified  -CS     Row Name 04/05/23 1500          Living Environment    Current Living Arrangements apartment  -CS     Home Accessibility stairs to enter home;stairs within home  -CS     People in Home alone  -CS     Primary Care Provided by self  -CS     Row Name 04/05/23 1500          Home Main Entrance    Number of Stairs, Main Entrance two  -CS     Stair Railings, Main Entrance none  -CS     Row Name 04/05/23 1500          Stairs Within Home, Primary    Stairs, Within Home, Primary Patient reports there [facility]  -CS     Stair Railings, Within Home, Primary railings safe and in good condition  -CS     Row Name 04/05/23 1500          Pain    Pain Intervention(s) Nursing Notified  -CS     Additional Documentation Pain Scale: FACES Pre/Post-Treatment (Group)  -     Row Name 04/05/23 1500          Pain Scale: FACES Pre/Post-Treatment    Pain: FACES Scale, Pretreatment 4-->hurts little more  -CS     Posttreatment Pain Rating 8-->hurts whole lot  -CS     Pain Location - Side/Orientation Left  -CS     Pain Location lower  -CS     Pain Location - extremity  specifically muscle spasms in left hip and left calf  -CS     Row Name 04/05/23 1500          Cognition    Orientation Status (Cognition) oriented x 3  -CS     Row Name 04/05/23 1500          Range of Motion Comprehensive    General Range of Motion lower extremity range of motion deficits identified  -CS     Comment, General Range of Motion BLEs limited due to increased tissue approximation and R ankle limited due to wrapping from transmetatarsal amputation and current non-healing wounds  -CS     Row Name 04/05/23 1500          Strength Comprehensive (MMT)    General Manual Muscle Testing (MMT) Assessment lower extremity strength deficits identified  -CS     Comment, General Manual Muscle  Testing (MMT) Assessment RLE assessed at 3/5 and LLE assessed at 3-/5  -     Row Name 04/05/23 1500          Bed Mobility    Bed Mobility bed mobility (all) activities  -     All Activities, Johnston (Bed Mobility) verbal cues;standby assist;contact guard  -     Bed Mobility, Safety Issues decreased use of legs for bridging/pushing  -     Assistive Device (Bed Mobility) bed rails;head of bed elevated  -     Row Name 04/05/23 1500          Transfers    Transfers sit-stand transfer;stand-sit transfer  -     Row Name 04/05/23 1500          Sit-Stand Transfer    Sit-Stand Johnston (Transfers) verbal cues;contact guard;minimum assist (75% patient effort)  -     Assistive Device (Sit-Stand Transfers) walker, front-wheeled  -     Row Name 04/05/23 1500          Stand-Sit Transfer    Stand-Sit Johnston (Transfers) verbal cues;contact guard;minimum assist (75% patient effort)  -     Assistive Device (Stand-Sit Transfers) walker, front-wheeled  -     Row Name 04/05/23 1500          Gait/Stairs (Locomotion)    Comment, (Gait/Stairs) Patient declined ambulation but has been ambulating to/from the bathroom with nursing staff present for safety and assist as needed. Pt using rolling walker for mobility.  -     Row Name 04/05/23 1500          Safety Issues, Functional Mobility    Safety Issues Affecting Function (Mobility) awareness of need for assistance;insight into deficits/self-awareness;safety precautions follow-through/compliance  -     Impairments Affecting Function (Mobility) balance;endurance/activity tolerance;strength;pain  -     Row Name 04/05/23 1500          Balance    Balance Assessment sitting dynamic balance  -     Dynamic Sitting Balance supervision;standby assist  -     Position, Sitting Balance supported;sitting edge of bed  -     Row Name             Wound 12/02/21 Right anterior foot Incision    Wound - Properties Group Placement Date: 12/02/21  -ES Side: Right  -ES  Orientation: anterior  -ES Location: foot  -ES Primary Wound Type: Incision  -ES    Retired Wound - Properties Group Placement Date: 12/02/21  -ES Side: Right  -ES Orientation: anterior  -ES Location: foot  -ES Primary Wound Type: Incision  -ES    Retired Wound - Properties Group Date first assessed: 12/02/21  -ES Side: Right  -ES Location: foot  -ES Primary Wound Type: Incision  -ES    Row Name             Wound 06/22/21 1133 Left lateral heel    Wound - Properties Group Placement Date: 06/22/21  -FABIOLA Placement Time: 1133  -FABIOLA Present on Hospital Admission: Y  -FABIOLA Side: Left  -FABIOLA Orientation: lateral  -FABIOLA Location: heel  -FABIOLA    Retired Wound - Properties Group Placement Date: 06/22/21  -FABIOLA Placement Time: 1133  -FABIOLA Present on Hospital Admission: Y  -FABIOLA Side: Left  -FABIOLA Orientation: lateral  -FABIOLA Location: heel  -FABIOLA    Retired Wound - Properties Group Date first assessed: 06/22/21  -FABIOLA Time first assessed: 1133  -FABIOLA Present on Hospital Admission: Y  -FABIOLA Side: Left  -FABIOLA Location: heel  -FABIOLA    Row Name 04/05/23 1500          Plan of Care Review    Plan of Care Reviewed With patient  -CS     Progress no change  -CS     Outcome Evaluation Pt presents with limitations that impede their ability to safely and independently transfer and ambulate. The skills of a therapist will be required to safely and effectively implement the following treatment plan to restore maximal level of function.  -CS     Row Name 04/05/23 1500          PT Evaluation Complexity    History, PT Evaluation Complexity 1-2 personal factors and/or comorbidities  -CS     Examination of Body Systems (PT Eval Complexity) total of 4 or more elements  -CS     Clinical Presentation (PT Evaluation Complexity) evolving  -CS     Clinical Decision Making (PT Evaluation Complexity) moderate complexity  -CS     Overall Complexity (PT Evaluation Complexity) moderate complexity  -CS     Row Name 04/05/23 1500          Therapy Plan Review/Discharge Plan (PT)    Therapy  Plan Review (PT) evaluation/treatment results reviewed;patient  -CS     Row Name 04/05/23 1500          Physical Therapy Goals    Bed Mobility Goal Selection (PT) bed mobility, PT goal 1  -CS     Transfer Goal Selection (PT) transfer, PT goal 1  -CS     Gait Training Goal Selection (PT) gait training, PT goal 1  -CS     Row Name 04/05/23 1500          Bed Mobility Goal 1 (PT)    Activity/Assistive Device (Bed Mobility Goal 1, PT) bed mobility activities, all  -CS     [place] Level/Cues Needed (Bed Mobility Goal 1, PT) modified independence  -CS     Time Frame (Bed Mobility Goal 1, PT) long term goal (LTG);10 days  -CS     Row Name 04/05/23 1500          Transfer Goal 1 (PT)    Activity/Assistive Device (Transfer Goal 1, PT) bed-to-chair/chair-to-bed;sit-to-stand/stand-to-sit;walker, rolling  -CS     [place] Level/Cues Needed (Transfer Goal 1, PT) modified independence  -CS     Time Frame (Transfer Goal 1, PT) long term goal (LTG);10 days  -CS     Row Name 04/05/23 1500          Gait Training Goal 1 (PT)    Activity/Assistive Device (Gait Training Goal 1, PT) gait (walking locomotion);assistive device use;walker, rolling  -CS     [place] Level (Gait Training Goal 1, PT) modified independence  -CS     Distance (Gait Training Goal 1, PT) 25  -CS     Time Frame (Gait Training Goal 1, PT) long term goal (LTG);10 days  -CS           User Key  (r) = Recorded By, (t) = Taken By, (c) = Cosigned By    Initials Name Provider Type    Marlen Vaughn, RN Registered Nurse    Kim Avila, PT Physical Therapist    Katlyn Garcia RN Registered Nurse                Physical Therapy Education     Title: PT OT SLP Therapies (In Progress)     Topic: Physical Therapy (In Progress)     Point: Mobility training (In Progress)     Learning Progress Summary           Patient Acceptance, E, NR by MARSHAL at 4/5/2023 0987                   Point: Home exercise program (Not Started)     Learner Progress:  Not documented in  this visit.          Point: Body mechanics (Not Started)     Learner Progress:  Not documented in this visit.          Point: Precautions (In Progress)     Learning Progress Summary           Patient Acceptance, E, NR by  at 4/5/2023 4537                               User Key     Initials Effective Dates Name Provider Type Discipline     04/25/21 -  Kim Esparza PT Physical Therapist PT              PT Recommendation and Plan  Anticipated Discharge Disposition (PT): sub acute care setting, home with home health, home with outpatient therapy services  Plan of Care Reviewed With: patient  Progress: no change  Outcome Evaluation: Pt presents with limitations that impede their ability to safely and independently transfer and ambulate. The skills of a therapist will be required to safely and effectively implement the following treatment plan to restore maximal level of function.   Outcome Measures     Row Name 04/05/23 1500             How much help from another person do you currently need...    Turning from your back to your side while in flat bed without using bedrails? 4  -CS      Moving from lying on back to sitting on the side of a flat bed without bedrails? 4  -CS      Moving to and from a bed to a chair (including a wheelchair)? 3  -CS      Standing up from a chair using your arms (e.g., wheelchair, bedside chair)? 4  -CS      Climbing 3-5 steps with a railing? 2  -CS      To walk in hospital room? 3  -CS      AM-PAC 6 Clicks Score (PT) 20  -CS         Functional Assessment    Outcome Measure Options AM-PAC 6 Clicks Basic Mobility (PT)  -            User Key  (r) = Recorded By, (t) = Taken By, (c) = Cosigned By    Initials Name Provider Type    Kim Avila PT Physical Therapist                 Time Calculation:    PT Charges     Row Name 04/05/23 1523             Time Calculation    PT Received On 04/05/23  -      PT Goal Re-Cert Due Date 04/14/23  -         Untimed Charges    PT  Eval/Re-eval Minutes 32  -CS         Total Minutes    Untimed Charges Total Minutes 32  -CS       Total Minutes 32  -CS            User Key  (r) = Recorded By, (t) = Taken By, (c) = Cosigned By    Initials Name Provider Type    CS Kim Esparza, MERLIN Physical Therapist              Therapy Charges for Today     Code Description Service Date Service Provider Modifiers Qty    [id number] HC PT EVAL MOD COMPLEXITY 3 4/5/2023 Kim Esparza PT GP 1          PT G-Codes  Outcome Measure Options: AM-PAC 6 Clicks Basic Mobility (PT)  AM-PAC 6 Clicks Score (PT): 20  AM-PAC 6 Clicks Score (OT): 19    Kim Esparza PT  4/5/2023

## 2023-04-05 NOTE — PROGRESS NOTES
[facility]     Progress Note    Patient Name: Jose Shaikh  : 1960  MRN: [id number]  Primary Care Physician:  Delia Cervantes, APRN  Date of admission: 4/3/2023    Subjective   Subjective     Here for follow-up for nonhealing bilateral foot wounds due to osteomyelitis.    The patient discussed his case with podiatry and he feels that if he was able to go to rehab then he would be agreeable to proceeding with a right below-knee amputation.    Arrangements are being made to determine if he will be able to go to rehab postoperatively.    Objective   Objective     Vitals:   Temp:  [97.3 °F (36.3 °C)-98.5 °F (36.9 °C)] 98.5 °F (36.9 °C)  Heart Rate:  [66-70] 70  Resp:  [16-17] 16  BP: ()/(52-63) 106/53    Physical Exam   General: Alert, no acute distress.  Extremities: Symmetric.        Assessment & Plan   Assessment / Plan     Assessment/Plan:    We will proceed to a right BKA if patient agreeable pending on his ability to rehabilitate postoperatively.    Active Hospital Problems:  Active Hospital Problems    Diagnosis    • **Diabetic foot infection            Electronically signed by Jakob Fuller MD, 23, 1:36 PM EDT.

## 2023-04-05 NOTE — THERAPY TREATMENT NOTE
Patient Name: Jose Shaikh  : 1960    MRN: [id number]                              Today's Date: 2023       Admit Date: 4/3/2023    Visit Dx:     ICD-10-CM ICD-9-CM   1. Decreased activities of daily living (ADL)  Z78.9 V49.89     Patient Active Problem List   Diagnosis   • Diabetic ulcer of left heel associated with type 2 DM   • Acute osteomyelitis of left calcaneus    • Diabetic ulcer of left heel associated with type 2 DM   • Diabetic ulcer of right midfoot associated with type 2 DM   • Paroxysmal atrial fibrillation   • Essential hypertension   • Hyperlipidemia LDL goal <100   • Cellulitis and abscess of foot   • High alkaline phosphatase level   • Osteomyelitis   • Onychomycosis   • Onychocryptosis   • Foot pain, bilateral   • Osteomyelitis of foot, right, acute   • Cellulitis of right foot   • Type 2 diabetes mellitus, with long-term current use of insulin   • Class 3 severe obesity due to excess calories with serious comorbidity and body mass index (BMI) of 45.0 to 49.9 in adult   • Anxiety disorder, unspecified   • Claustrophobia   • Dependence on wheelchair   • Depression, unspecified   • Long term (current) use of anticoagulants   • Long term (current) use of oral hypoglycemic drugs   • Wound of foot   • Non-prs chronic ulcer oth prt r foot limited to brkdwn skin   • Orthostatic hypotension   • Other chronic osteomyelitis, right ankle and foot   • Personal history of nicotine dependence   • Thrombocytopenia, unspecified   • Unspecified open wound, right foot, initial encounter   • Diabetic foot infection     Past Medical History:   Diagnosis Date   • Absence of toe of right foot    • Acute osteomyelitis of left calcaneus  2021   • Anxiety and depression    • Arthritis    • Claustrophobia    • Corns and callus    • Diabetic ulcer of left heel associated with type 2 DM 2021   • Diabetic ulcer of left heel associated with type 2 DM 2021   • Diabetic ulcer of right midfoot  "associated with type 2 DM 8/18/2021   • Difficulty walking    • Essential hypertension 8/31/2021   • Hammertoe    • Hyperlipidemia LDL goal <100 8/31/2021   • Ingrown toenail    • Obesity    • Paroxysmal atrial fibrillation 8/31/2021   • Polyneuropathy    • Pressure ulcer, stage 1    • Tinea unguium    • Type 2 diabetes mellitus with polyneuropathy      Past Surgical History:   Procedure Laterality Date   • CYST REMOVAL      center of back; benign   • INCISION AND DRAINAGE ABSCESS      back   • INCISION AND DRAINAGE LEG Right 12/10/2021    Procedure: INCISION AND DRAINAGE LOWER EXTREMITY;  Surgeon: Ash Leyva DPM;  Location: [facility] MAIN OR;  Service: Podiatry;  Laterality: Right;   • OTHER SURGICAL HISTORY      Surgical clips left foot   • TOE SURGERY Right     Removal of 5th toe   • TRANS METATARSAL AMPUTATION Right 12/2/2021    Procedure: AMPUTATION TRANS METATARSAL;  Surgeon: Ash Leyva DPM;  Location: [facility] MAIN OR;  Service: Podiatry;  Laterality: Right;   • WRIST SURGERY Left     repair of injury      General Information     Row Name 04/05/23 1134          OT Time and Intention    Document Type therapy note (daily note)  -EG     Mode of Treatment individual therapy;occupational therapy  -EG     Row Name 04/05/23 1134          Safety Issues, Functional Mobility    Impairments Affecting Function (Mobility) balance;endurance/activity tolerance  -EG           User Key  (r) = Recorded By, (t) = Taken By, (c) = Cosigned By    Initials Name Provider Type    EG Maren Heard OT Occupational Therapist                 Mobility/ADL's     Row Name 04/05/23 1134          Transfers    Comment, (Transfers) Declines performance; \"I know I aint getting up on my feet today, until they took my foot\"  -EG           User Key  (r) = Recorded By, (t) = Taken By, (c) = Cosigned By    Initials Name Provider Type    EG Maren Heard, BEKAH Occupational Therapist               Obj/Interventions     Row " Name 04/05/23 1135          Vision Assessment/Intervention    Visual Impairment/Limitations WFL;corrective lenses for reading  -EG     Row Name 04/05/23 1135          Shoulder (Therapeutic Exercise)    Shoulder (Therapeutic Exercise) strengthening exercise  -EG     Shoulder Strengthening (Therapeutic Exercise) bilateral;scapular stabilization;flexion;extension;horizontal aBduction/aDduction;2 lb free weight;3 sets;15 repititions  SemiFowlers in bed  -EG     Row Name 04/05/23 1135          Elbow/Forearm (Therapeutic Exercise)    Elbow/Forearm (Therapeutic Exercise) strengthening exercise  -EG     Elbow/Forearm Strengthening (Therapeutic Exercise) bilateral;flexion;extension;15 repititions;3 sets;2 lb free weight  Semifowlers in bed  -EG     Row Name 04/05/23 1135          Motor Skills    Therapeutic Exercise shoulder;elbow/forearm  -EG     Row Name 04/05/23 1135          Balance    Balance Interventions sitting  -EG           User Key  (r) = Recorded By, (t) = Taken By, (c) = Cosigned By    Initials Name Provider Type    EG Maren Heard, OT Occupational Therapist               Goals/Plan    No documentation.                Clinical Impression     Row Name 04/05/23 1136          Pain Scale: FACES Pre/Post-Treatment    Pain: FACES Scale, Pretreatment 2-->hurts little bit  -EG     Posttreatment Pain Rating 4-->hurts little more  -EG     Row Name 04/05/23 1136          Plan of Care Review    Plan of Care Reviewed With patient  -EG     Progress no change  -EG     Outcome Evaluation Patient declines OOB activity this date; Agreeable to minimal therex participation; OT services continue to be beneficial to patient to facilitate improvements in decreased OOB tolerance, balance and strength for improved ADL task ind. skills  -EG     Row Name 04/05/23 1136          Therapy Assessment/Plan (OT)    Rehab Potential (OT) good, to achieve stated therapy goals  -EG     Criteria for Skilled Therapeutic Interventions Met (OT)  yes;meets criteria;skilled treatment is necessary  -EG     Therapy Frequency (OT) 5 times/wk  -EG           User Key  (r) = Recorded By, (t) = Taken By, (c) = Cosigned By    Initials Name Provider Type    Maren Luu OT Occupational Therapist               Outcome Measures     Row Name 04/05/23 1139          How much help from another is currently needed...    Putting on and taking off regular lower body clothing? 2  -EG     Bathing (including washing, rinsing, and drying) 2  -EG     Toileting (which includes using toilet bed pan or urinal) 3  -EG     Putting on and taking off regular upper body clothing 4  -EG     Taking care of personal grooming (such as brushing teeth) 4  -EG     Eating meals 4  -EG     AM-PAC 6 Clicks Score (OT) 19  -EG     Row Name 04/05/23 1139          Functional Assessment    Outcome Measure Options AM-PAC 6 Clicks Daily Activity (OT);Optimal Instrument  -EG     Row Name 04/05/23 1139          Optimal Instrument    Optimal Instrument Optimal - 3  -EG     Bending/Stooping 3  -EG     Standing 2  -EG     Reaching 1  -EG           User Key  (r) = Recorded By, (t) = Taken By, (c) = Cosigned By    Initials Name Provider Type    Maren Luu OT Occupational Therapist                Occupational Therapy Education     Title: PT OT SLP Therapies (In Progress)     Topic: Occupational Therapy (In Progress)     Point: ADL training (Done)     Description:   Instruct learner(s) on proper safety adaptation and remediation techniques during self care or transfers.   Instruct in proper use of assistive devices.              Learning Progress Summary           Patient Acceptance, E VU by BRIANNA at 4/4/2023 1026                   Point: Home exercise program (Not Started)     Description:   Instruct learner(s) on appropriate technique for monitoring, assisting and/or progressing therapeutic exercises/activities.              Learner Progress:  Not documented in this visit.          Point:  Precautions (Not Started)     Description:   Instruct learner(s) on prescribed precautions during self-care and functional transfers.              Learner Progress:  Not documented in this visit.          Point: Body mechanics (Not Started)     Description:   Instruct learner(s) on proper positioning and spine alignment during self-care, functional mobility activities and/or exercises.              Learner Progress:  Not documented in this visit.                      User Key     Initials Effective Dates Name Provider Type Discipline    AV 06/16/21 -  Uvaldo Christine OT Occupational Therapist OT              OT Recommendation and Plan  Therapy Frequency (OT): 5 times/wk  Plan of Care Review  Plan of Care Reviewed With: patient  Progress: no change  Outcome Evaluation: Patient declines OOB activity this date; Agreeable to minimal therex participation; OT services continue to be beneficial to patient to facilitate improvements in decreased OOB tolerance, balance and strength for improved ADL task ind. skills     Time Calculation:    Time Calculation- OT     Row Name 04/05/23 1140             Time Calculation- OT    OT Received On 04/05/23  -EG      OT Goal Re-Cert Due Date 04/13/23  -EG         Timed Charges    12814 - OT Therapeutic Exercise Minutes 11  -EG         Total Minutes    Timed Charges Total Minutes 11  -EG       Total Minutes 11  -EG            User Key  (r) = Recorded By, (t) = Taken By, (c) = Cosigned By    Initials Name Provider Type    EG Marne Heard OT Occupational Therapist              Therapy Charges for Today     Code Description Service Date Service Provider Modifiers Qty    [id number]  OT THER PROC EA 15 MIN 4/5/2023 Maren Heard OT GO 1               Maren Heard OT  4/5/2023

## 2023-04-05 NOTE — CONSULTS
04/05/23   Foot and Ankle Surgery - Consult  Provider: Ha Nick DPM  Location: [facility]    Subjective:  Jose Shaikh is a 62 y.o. male.     No chief complaint on file.      HPI: Patient is a 62-year-old diabetic male presenting with right and left foot wounds.  Patient states he has had them since 2021.  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  He states that it gets better but never fully heals.  He lives by himself.  He also has a chronic wound on his left foot.  He had a previous partial calcanectomy done and the wound does not heal completely.  He states he came to the hospital due to worsening redness and erythema to his right foot.  Patient denies any nausea vomiting or chills.  He states he understands that he likely needs an amputation on his right foot but is concerned due to him living at home and rehab placement.    Allergies   Allergen Reactions   • Adhesive Tape Rash       Past Medical History:   Diagnosis Date   • Absence of toe of right foot    • Acute osteomyelitis of left calcaneus  8/18/2021   • Anxiety and depression    • Arthritis    • Claustrophobia    • Corns and callus    • Diabetic ulcer of left heel associated with type 2 DM 8/18/2021   • Diabetic ulcer of left heel associated with type 2 DM 7/6/2021   • Diabetic ulcer of right midfoot associated with type 2 DM 8/18/2021   • Difficulty walking    • Essential hypertension 8/31/2021   • Hammertoe    • Hyperlipidemia LDL goal <100 8/31/2021   • Ingrown toenail    • Obesity    • Paroxysmal atrial fibrillation 8/31/2021   • Polyneuropathy    • Pressure ulcer, stage 1    • Tinea unguium    • Type 2 diabetes mellitus with polyneuropathy        Past Surgical History:   Procedure Laterality Date   • CYST REMOVAL      center of back; benign   • INCISION AND DRAINAGE ABSCESS      back   • INCISION AND DRAINAGE LEG Right 12/10/2021    Procedure: INCISION  AND DRAINAGE LOWER EXTREMITY;  Surgeon: Ash Leyva DPM;  Location: [facility] MAIN OR;  Service: Podiatry;  Laterality: Right;   • OTHER SURGICAL HISTORY      Surgical clips left foot   • TOE SURGERY Right     Removal of 5th toe   • TRANS METATARSAL AMPUTATION Right 2021    Procedure: AMPUTATION TRANS METATARSAL;  Surgeon: Ash Leyva DPM;  Location: [facility] MAIN OR;  Service: Podiatry;  Laterality: Right;   • WRIST SURGERY Left     repair of injury       Family History   Problem Relation Age of Onset   • Heart disease Mother    • Heart disease Father    • Cancer Father         Unspecified   • Heart disease Brother        Social History     Socioeconomic History   • Marital status:    Tobacco Use   • Smoking status: Former     Packs/day: 0.00     Years: 1.00     Pack years: 0.00     Types: Cigarettes     Quit date: 1991     Years since quittin.6   • Smokeless tobacco: Never   • Tobacco comments:     quit at age 30   Vaping Use   • Vaping Use: Never used   Substance and Sexual Activity   • Alcohol use: Yes     Comment: Rare   • Drug use: Yes     Types: Marijuana     Comment: Daily   • Sexual activity: Defer          Current Facility-Administered Medications:   •  acetaminophen (TYLENOL) tablet 650 mg, 650 mg, Oral, Q4H PRN **OR** acetaminophen (TYLENOL) 160 MG/5ML solution 650 mg, 650 mg, Oral, Q4H PRN **OR** acetaminophen (TYLENOL) suppository 650 mg, 650 mg, Rectal, Q4H PRN, Omar Juarez, DO  •  aluminum-magnesium hydroxide-simethicone (MAALOX MAX) 400-400-40 MG/5ML suspension 15 mL, 15 mL, Oral, Q6H PRN, Omar Juarez, DO  •  atorvastatin (LIPITOR) tablet 10 mg, 10 mg, Oral, Daily, Omar Juarez, , 10 mg at 23 0828  •  sennosides-docusate (PERICOLACE) 8.6-50 MG per tablet 2 tablet, 2 tablet, Oral, BID **AND** polyethylene glycol (MIRALAX) packet 17 g, 17 g, Oral, Daily PRN **AND** bisacodyl (DULCOLAX) EC tablet 5 mg, 5 mg, Oral, Daily PRN **AND** bisacodyl  (DULCOLAX) suppository 10 mg, 10 mg, Rectal, Daily PRN, Omar Juarez, DO  •  cefepime (MAXIPIME) 2 g/100 mL 0.9% NS (mbp), 2 g, Intravenous, Q8H, Omar Juarez DO, Stopped at 04/05/23 0520  •  dextrose (D50W) (25 g/50 mL) IV injection 25 g, 25 g, Intravenous, Q15 Min PRN, Omar Juarez,   •  dextrose (GLUTOSE) oral gel 15 g, 15 g, Oral, Q15 Min PRN, Omar Juarez,   •  Diclofenac Sodium (VOLTAREN) 1 % gel 2 g, 2 g, Topical, 4x Daily, Omar Juarez DO, 2 g at 04/03/23 2113  •  Enoxaparin Sodium (LOVENOX) syringe 170 mg, 1 mg/kg, Subcutaneous, Q12H, Omar Juarez DO, 170 mg at 04/04/23 2120  •  glucagon (GLUCAGEN) injection 1 mg, 1 mg, Intramuscular, Q15 Min PRN, Omar Juarez,   •  Insulin Lispro (humaLOG) injection 2-9 Units, 2-9 Units, Subcutaneous, 4x Daily With Meals & Nightly, Omar Juarez DO, 2 Units at 04/04/23 2119  •  metoprolol succinate XL (TOPROL-XL) 24 hr tablet 50 mg, 50 mg, Oral, Daily, Omar Juarez DO, 50 mg at 04/04/23 0827  •  morphine injection 2 mg, 2 mg, Intravenous, Q4H PRN, 2 mg at 04/05/23 0323 **AND** naloxone (NARCAN) injection 0.4 mg, 0.4 mg, Intravenous, Q5 Min PRN, Omar Juarez, DO  •  ondansetron (ZOFRAN) injection 4 mg, 4 mg, Intravenous, Q6H PRN, Omar Juarez, DO  •  oxyCODONE-acetaminophen (PERCOCET) 7.5-325 MG per tablet 1 tablet, 1 tablet, Oral, Q6H PRN, Omar Juarez, DO  •  Pharmacy to Dose Cefepime, , Does not apply, Continuous PRN, Omar Juarez, DO  •  Pharmacy to Dose enoxaparin (LOVENOX), , Does not apply, Continuous PRN, Omar Juarez,   •  Pharmacy to dose vancomycin, , Does not apply, Continuous PRN, Omar Juarez,   •  sodium chloride 0.9 % flush 10 mL, 10 mL, Intravenous, PRN, Omar Juarez, DO  •  sodium chloride 0.9 % flush 10 mL, 10 mL, Intravenous, Q12H, Omar Juarez DO, 10 mL at 04/04/23 2156  •  sodium chloride 0.9 % infusion 40 mL, 40 mL, Intravenous, PRN, Omar Juarez, DO  •  Sodium Hypochlorite (DAKIN'S)  "0.125 % topical solution 0.125% (quarter strength), , Topical, 2 times per day, Omar Juarez DO, Given at 04/04/23 2120  •  vancomycin IVPB 2000 mg in 0.9% Sodium Chloride (premix) 500 mL, 2,000 mg, Intravenous, Q12H, Omar Juarez DO, 2,000 mg at 04/05/23 0540    Review of Systems:  General: Denies fever, chills, fatigue, and weakness.  Eyes: Denies vision loss, blurry vision, and excessive redness.  ENT: Denies hearing issues and difficulty swallowing.  Cardiovascular: Denies palpitations, chest pain, or syncopal episodes.  Respiratory: Denies shortness of breath, wheezing, and coughing.  GI: Denies abdominal pain, nausea, and vomiting.   : Denies frequency, hematuria, and urgency.  Musculoskeletal: Denies muscle cramps, joint pains, and stiffness.  Derm: Denies rash, open wounds, or suspicious lesions.  Neuro: Denies headaches, numbness, loss of coordination, and tremors.  Psych: Denies anxiety and depression.  Endocrine: Denies temperature intolerance and changes in appetite.  Heme: Denies bleeding disorders or abnormal bruising.     Objective   /63 (BP Location: Right arm, Patient Position: Lying)   Pulse 69   Temp 98.5 °F (36.9 °C) (Oral)   Resp 16   Ht 185.4 cm (73\")   Wt (!) 168 kg (370 lb)   SpO2 96%   BMI 48.82 kg/m²     Foot/Ankle Exam:       General:   Appearance: appears stated age and healthy    Orientation: AAOx3    Affect: appropriate    Gait: unimpaired    Shoe Gear:  Casual shoes    VASCULAR      Right Foot Vascularity   Normal vascular exam    Dorsalis pedis:  1+  Posterior tibial:  1+  Skin Temperature: warm    Edema Grading:  None  CFT:  < 3 seconds  Pedal Hair Growth:  Present  Varicosities: none       Left Foot Vascularity   Normal vascular exam    Dorsalis pedis:  2+  Posterior tibial:  2+  Skin Temperature: warm    Edema Grading:  None  CFT:  < 3 seconds  Pedal Hair Growth:  Present  Varicosities: none        NEUROLOGIC     Right Foot Neurologic   Light touch sensation: "  Absent  Vibratory sensation:  Absent  Hot/Cold sensation: absent    Protective Sensation using [place] Monofilament:  Diminished     Left Foot Neurologic   Light touch sensation:  Absent  Vibratory sensation:  Absent  Hot/cold sensation: absent    Protective Sensation using [place] Monofilament:  Diminished     MUSCULOSKELETAL      Right Foot Musculoskeletal    Amputation   Trans metatarsal right foot: Yes (Lisfranc amputation)       MUSCLE STRENGTH     Right Foot Muscle Strength   Foot dorsiflexion:  3  Foot plantar flexion:  3     Left Foot Muscle Strength   Foot dorsiflexion:  4  Foot plantar flexion:  4  Foot inversion:  4  Foot eversion:  4     RANGE OF MOTION      Right Foot Range of Motion   Foot and ankle ROM within normal limits       Left Foot Range of Motion   Foot and ankle ROM within normal limits       DERMATOLOGIC     Right Foot Dermatologic   Skin: skin intact    Nails: normal       Left Foot Dermatologic   Skin comment:  Ulcer to plantar aspect of left heel 100% granular no malodor no drainage  Nails: onychomycosis and abnormally thick        Culture results from last 30 days   Lab 04/03/23  1155 04/03/23  1153   WOUNDCX Growth present, too young to evaluate Growth present, too young to evaluate       Results from last 7 days   Lab Units 04/05/23  0519   WBC 10*3/mm3 4.69   HEMOGLOBIN g/dL 11.3*   HEMATOCRIT % 34.7*   PLATELETS 10*3/mm3 153       Assessment & Plan     Active Hospital Problems    Diagnosis    • **Diabetic foot infection          Patient with previous Lisfranc amputation on right lower extremity with concern for osteomyelitis in his remaining cuneiforms and navicular.  Discussed with patient the salvageability of his right foot along with functionality.  Discussed potential of worsening infection and sepsis.  Recommend amputation from podiatry standpoint.  Patient is concerned with rehab and acceptance if he were to have this done, since he lives at home by  himself.    Left foot wound stable.  MRI reviewed, improved calcaneal edema from previous MRI.  Likely reactive secondary to patient's previous calcanectomy.      Continue IV antibiotic    Discussed with Dr. Sheth    Thank you for the consultation and allowing me to participate in this patient's care. Please call with any additional questions or concerns.     Part of this note may be an electronic transcription/translation of spoken language to printed text using the Dragon Dictation System.

## 2023-04-05 NOTE — PROGRESS NOTES
[facility]   Hospitalist Progress Note  Date: 2023  Patient Name: Jose Shaikh  : 1960  MRN: [id number]  Date of admission: 4/3/2023      Subjective   Subjective     Chief Complaint: bilateral feet wounds     Summary: 62 y.o. male with past medical history stable for previous history of osteomyelitis status post transmetatarsal amputation of the right foot, paroxysmal atrial fibrillation on Eliquis, essential hypertension, hyperlipidemia, depression, and anxiety who presents with chief complaint of worsening right foot infection.  Patient underwent previous transmetatarsal amputation      Interval Followup:   MRI reviewed.    Vascular and podiatry following  Podiatry is recommending amputation on the right leg.  Patient is okay with this as long as he is able to go to rehab facility.  Social work is currently working on what facilities will take him    Review of Systems   All systems were reviewed and negative except for: Bilateral foot wounds    Objective   Objective     Vitals:   Temp:  [97.3 °F (36.3 °C)-98.5 °F (36.9 °C)] 98.5 °F (36.9 °C)  Heart Rate:  [66-70] 69  Resp:  [16-17] 16  BP: ()/(52-63) 109/63  Physical Exam    Constitutional: Awake, alert, no acute distress, resting in bed watching TV   Eyes: Pupils equal, sclerae anicteric, no conjunctival injection   HENT: NCAT, mucous membranes moist   Neck: Supple, no thyromegaly, no lymphadenopathy, trachea midline   Respiratory: Clear to auscultation bilaterally, nonlabored respirations    Cardiovascular: RRR, no murmurs, rubs, or gallops, palpable pedal pulses bilaterally.  No edema   Gastrointestinal: Positive bowel sounds, soft, nontender, nondistended   Musculoskeletal: No bilateral ankle edema, no clubbing or cyanosis to extremities   Psychiatric: Appropriate affect, cooperative   Neurologic: Oriented x 3, strength symmetric in all extremities, Cranial Nerves grossly intact to confrontation, speech clear   Skin: No rashes.   Bilateral foot room wounds. See media section of chat     Result Review    Result Review:  I have personally reviewed the results from the time of this admission to 4/5/2023 09:20 EDT and agree with these findings:  [x]  Laboratory  CBC    CBC 4/3/23 4/4/23 4/5/23   WBC 6.61 5.52 4.69   RBC 4.59 4.45 4.37   Hemoglobin 11.8 (A) 11.4 (A) 11.3 (A)   Hematocrit 36.7 (A) 35.1 (A) 34.7 (A)   MCV 80.0 78.9 (A) 79.4   MCH 25.7 (A) 25.6 (A) 25.9 (A)   MCHC 32.2 32.5 32.6   RDW 18.1 (A) 17.7 (A) 17.2 (A)   Platelets 132 (A) 148 153   (A) Abnormal value            BMP    BMP 4/3/23 4/4/23 4/5/23   BUN 12 12 11   Creatinine 0.68 (A) 0.61 (A) 0.58 (A)   Sodium 134 (A) 135 (A) 135 (A)   Potassium 3.9 4.0 4.2   Chloride 99 100 103   CO2 23.1 25.1 25.8   Calcium 8.4 (A) 8.2 (A) 8.3 (A)   (A) Abnormal value              [x]  Microbiology   No results found for: ACANTHNAEG, AFBCX, BPERTUSSISCX, BLOODCX  No results found for: BCIDPCR, CXREFLEX, CSFCX, CULTURETIS  No results found for: CULTURES, HSVCX, URCX  No results found for: EYECULTURE, GCCX, HSVCULTURE, LABHSV  No results found for: LEGIONELLA, MRSACX, MUMPSCX, MYCOPLASCX  No results found for: NOCARDIACX, STOOLCX  No results found for: THROATCX, UNSTIMCULT, URINECX, CULTURE, VZVCULTUR  Wound Culture   Date Value Ref Range Status   04/03/2023 Growth present, too young to evaluate  Preliminary       [x]  Radiology    MRI of left foot  1. Limited examination, as above   2. Soft tissue ulceration along the heel pad   3. 2.3 cm x 1.6 cm T2 high signal focus medial to the calcaneal cuboid joint.  This may represent a   dilated venous varix or sterile fluid collection.  Abscess is considered relatively unlikely   secondary to the lack of surrounding inflammatory change   4. Mild soft tissue edema noted elsewhere in the ankle probably representing dependent edema.   5. Edema in the calcaneal body could be secondary to osteomyelitis or reactive in nature.  The   amount of edema here  appears mildly less prominent than on the 8/11/2021 exam.   6. Postsurgical changes in the posterior calcaneus                Slava Galvan M.D.         Electronically Signed and Approved By: Slava Galvan M.D. on 4/04/2023 at 10:34      MRI of right foot     Impression:       1. 1.3 cm suspected wire fragment imbedded in the medial aspect of the heel pad   2. Previous amputation of the forefoot.  Osseous edema in the adjacent navicular, cuneiform and   cuboid appears worse in the interval, suspected to represent osteomyelitis.   3. Mild edema in the distal fibula, worse in the interval.  This edema may be reactive given the   location.   4. Soft tissue edema in the visualized lower extremity could represent cellulitis and or dependent   edema.   5. No soft tissue abscess identified                Slava Galvan M.D.         Electronically Signed and Approved By: Slava Galvan M.D. on 4/04/2023 at 10:22               []  EKG/Telemetry   []  Cardiology/Vascular   []  Pathology  []  Old records  []  Other:    Assessment & Plan   Assessment / Plan     Assessment/Plan:  Assessment:  Nonhealing transmetatarsal amputation of the right foot with osteomyelitis  Paroxysmal atrial fibrillation on Eliquis  Essential pretension  Type 2 diabetes mellitus  Essential hypertension  Hyperlipidemia  Morbid obesity, BMI 49.3  Anxiety/depression     Plan:   Vascular and surgery are following.  At this time patient is agreeable to have a amputation on the right.  Currently awaiting social work to make sure that patient will have  Patient has nonhealing transmetatarsal amputation of the right foot status post debridement at Dr. Canales's office on 4/3/2023, patient's left heel was also debrided  Continue antibiotics with vancomycin and cefepime, de-escalate pending culture data  Blood cultures are negative so far   MRI shown as above   Continue on remote telemetry, patient on Eliquis as outpatient, will start full dose Lovenox for now as he  will more likely require surgical intervention, restart Eliquis when appropriate   Patient is a lot of social barriers given his history of felony conviction (sex offender although patient denies any wrongdoing) and being unable to be placed in a rehab for which he really needs.        Discussed plan with RN.    DVT prophylaxis:  Medical and mechanical DVT prophylaxis orders are present.    CODE STATUS:   Code Status (Patient has no pulse and is not breathing): CPR (Attempt to Resuscitate)  Medical Interventions (Patient has pulse or is breathing): Full Support

## 2023-04-05 NOTE — PLAN OF CARE
Goal Outcome Evaluation:      Patient A&O x4. VSS. Medicated twice for pain on shift, see MAR. Wound care perform per orders. No significant change in patient's status on shift. Will continue to monitor.

## 2023-04-06 ENCOUNTER — READMISSION MANAGEMENT (OUTPATIENT)
Dept: CALL CENTER | Facility: HOSPITAL | Age: 63
End: 2023-04-06
Payer: MEDICARE

## 2023-04-06 VITALS
HEART RATE: 68 BPM | SYSTOLIC BLOOD PRESSURE: 123 MMHG | TEMPERATURE: 98.3 F | RESPIRATION RATE: 20 BRPM | HEIGHT: 73 IN | OXYGEN SATURATION: 97 % | DIASTOLIC BLOOD PRESSURE: 53 MMHG | BODY MASS INDEX: 41.75 KG/M2 | WEIGHT: 315 LBS

## 2023-04-06 PROBLEM — M86.271 SUBACUTE OSTEOMYELITIS OF RIGHT FOOT: Status: ACTIVE | Noted: 2023-04-06

## 2023-04-06 LAB
ANION GAP SERPL CALCULATED.3IONS-SCNC: 7.6 MMOL/L (ref 5–15)
BACTERIA SPEC AEROBE CULT: ABNORMAL
BACTERIA SPEC AEROBE CULT: ABNORMAL
BUN SERPL-MCNC: 11 MG/DL (ref 8–23)
BUN/CREAT SERPL: 16.9 (ref 7–25)
CALCIUM SPEC-SCNC: 8.3 MG/DL (ref 8.6–10.5)
CHLORIDE SERPL-SCNC: 102 MMOL/L (ref 98–107)
CO2 SERPL-SCNC: 25.4 MMOL/L (ref 22–29)
CREAT SERPL-MCNC: 0.65 MG/DL (ref 0.76–1.27)
EGFRCR SERPLBLD CKD-EPI 2021: 105.2 ML/MIN/1.73
GLUCOSE BLDC GLUCOMTR-MCNC: 143 MG/DL (ref 70–99)
GLUCOSE BLDC GLUCOMTR-MCNC: 218 MG/DL (ref 70–99)
GLUCOSE SERPL-MCNC: 150 MG/DL (ref 65–99)
GRAM STN SPEC: ABNORMAL
POTASSIUM SERPL-SCNC: 4.5 MMOL/L (ref 3.5–5.2)
SODIUM SERPL-SCNC: 135 MMOL/L (ref 136–145)
WHOLE BLOOD HOLD SPECIMEN: NORMAL

## 2023-04-06 PROCEDURE — 99239 HOSP IP/OBS DSCHRG MGMT >30: CPT | Performed by: INTERNAL MEDICINE

## 2023-04-06 PROCEDURE — 25010000002 ENOXAPARIN PER 10 MG: Performed by: STUDENT IN AN ORGANIZED HEALTH CARE EDUCATION/TRAINING PROGRAM

## 2023-04-06 PROCEDURE — 25010000002 MORPHINE PER 10 MG: Performed by: STUDENT IN AN ORGANIZED HEALTH CARE EDUCATION/TRAINING PROGRAM

## 2023-04-06 PROCEDURE — 80048 BASIC METABOLIC PNL TOTAL CA: CPT | Performed by: STUDENT IN AN ORGANIZED HEALTH CARE EDUCATION/TRAINING PROGRAM

## 2023-04-06 PROCEDURE — 25010000002 CEFEPIME PER 500 MG: Performed by: STUDENT IN AN ORGANIZED HEALTH CARE EDUCATION/TRAINING PROGRAM

## 2023-04-06 PROCEDURE — 82962 GLUCOSE BLOOD TEST: CPT

## 2023-04-06 PROCEDURE — 99231 SBSQ HOSP IP/OBS SF/LOW 25: CPT | Performed by: SURGERY

## 2023-04-06 PROCEDURE — 63710000001 INSULIN LISPRO (HUMAN) PER 5 UNITS: Performed by: STUDENT IN AN ORGANIZED HEALTH CARE EDUCATION/TRAINING PROGRAM

## 2023-04-06 RX ORDER — ATORVASTATIN CALCIUM 10 MG/1
10 TABLET, FILM COATED ORAL NIGHTLY
Status: DISCONTINUED | OUTPATIENT
Start: 2023-04-06 | End: 2023-04-06 | Stop reason: HOSPADM

## 2023-04-06 RX ORDER — CIPROFLOXACIN 500 MG/1
500 TABLET, FILM COATED ORAL 2 TIMES DAILY
Qty: 60 TABLET | Refills: 0 | Status: SHIPPED | OUTPATIENT
Start: 2023-04-06 | End: 2023-05-06

## 2023-04-06 RX ORDER — DOXYCYCLINE HYCLATE 100 MG/1
100 CAPSULE ORAL 2 TIMES DAILY
Qty: 60 CAPSULE | Refills: 0 | Status: SHIPPED | OUTPATIENT
Start: 2023-04-06 | End: 2023-05-06

## 2023-04-06 RX ADMIN — CEFEPIME HYDROCHLORIDE 2 G: 2 INJECTION, POWDER, FOR SOLUTION INTRAVENOUS at 08:32

## 2023-04-06 RX ADMIN — ENOXAPARIN SODIUM 170 MG: 100 INJECTION SUBCUTANEOUS at 08:51

## 2023-04-06 RX ADMIN — INSULIN LISPRO 4 UNITS: 100 INJECTION, SOLUTION INTRAVENOUS; SUBCUTANEOUS at 12:04

## 2023-04-06 RX ADMIN — Medication 10 ML: at 08:33

## 2023-04-06 RX ADMIN — MORPHINE SULFATE 2 MG: 2 INJECTION, SOLUTION INTRAMUSCULAR; INTRAVENOUS at 01:36

## 2023-04-06 RX ADMIN — MORPHINE SULFATE 2 MG: 2 INJECTION, SOLUTION INTRAMUSCULAR; INTRAVENOUS at 06:27

## 2023-04-06 RX ADMIN — DAKIN'S SOLUTION 0.125% (QUARTER STRENGTH): 0.12 SOLUTION at 09:29

## 2023-04-06 RX ADMIN — CEFEPIME HYDROCHLORIDE 2 G: 2 INJECTION, POWDER, FOR SOLUTION INTRAVENOUS at 01:36

## 2023-04-06 RX ADMIN — METOPROLOL SUCCINATE 50 MG: 50 TABLET, EXTENDED RELEASE ORAL at 08:04

## 2023-04-06 NOTE — SIGNIFICANT NOTE
04/06/23 1005   Coping/Psychosocial   Observed Emotional State calm;cooperative;happy   Verbalized Emotional State hopefulness;relief   Trust Relationship/Rapport empathic listening provided   Involvement in Care interacting with patient   Additional Documentation Spiritual Care (Group)   Spiritual Care   Use of Spiritual Resources prayer;spirituality for coping, indicated strong use of   Spiritual Care Source  initiative   Spiritual Care Follow-Up follow-up, none required as presently assessed   Response to Spiritual Care engaged in conversation;receptive of support;thanks expressed   Spiritual Care Interventions supportive conversation provided;prayer support provided   Spiritual Care Visit Type initial   Receptivity to Spiritual Care visit welcomed

## 2023-04-06 NOTE — PROGRESS NOTES
[facility]     Progress Note    Patient Name: Jose Shaikh  : 1960  MRN: [id number]  Primary Care Physician:  Delia Cervantes, APRN  Date of admission: 4/3/2023    Subjective   Subjective     Here for follow-up for nonhealing bilateral foot wounds due to osteomyelitis.    Doing okay.  No new complaints.  He would not be agreeable to proceeding with below-knee amputation if he is not preapproved to go to rehab.    Objective   Objective     Vitals:   Temp:  [98.3 °F (36.8 °C)-99.8 °F (37.7 °C)] 98.3 °F (36.8 °C)  Heart Rate:  [68-76] 68  Resp:  [16-20] 20  BP: (108-129)/(47-88) 123/53    Physical Exam   General: Alert, no acute distress.  Extremities: Symmetric.        Assessment & Plan   Assessment / Plan     Assessment/Plan:    We will continue to follow-up peripherally pending outcome of postsurgical planning.      Active Hospital Problems:  Active Hospital Problems    Diagnosis    • **Diabetic foot infection            Electronically signed by Jakob Fuller MD, 23, 1:36 PM EDT.

## 2023-04-06 NOTE — PROGRESS NOTES
"Enter Query Response Below      Query Response: Cellulitis Related to Diabetes Mellitus             If applicable, please update the problem list.   Patient: Jose Shaikh        : 1960  Account: [id number]           Admit Date: 4/3/2023        How to Respond to this query:       a. Click New Note     b. Answer query within the yellow box.                c. Update the Problem List, if applicable.      If you have any questions about this query contact me at: [email or web address]     Dr. Daniels,     62 year male patient with PMHx that includes Type 2 Diabetes Mellitus with Polyneuropathy, status post Right Transmetatarsal amputation of the right foot admitted 23 with Nonhealing Transmetatarsal Amputation of Right Foot with Osteomyelitis, Left Plantar Foot Wound.        Progress note states, \"Cellulitis of right foot\"    Labs:  23 Glucose  188    Glucose  189    Glucose  170   Glucose 150     Treatment has included Excisional Debridement of Left and Right Foot Wounds, IV Antibiotics, Sliding Scale Insulin Protocol.     Please clarify the following :  > Cellulitis Related to Diabetes Mellitus  > Cellulitis Not Related to Diabetes Mellitus  > Other Explanation: please specify  ______________  > Unable to Determine     By submitting this query, we are merely seeking further clarification of documentation to accurately reflect all conditions that you are monitoring, evaluating, treating or that extend the hospitalization or utilize additional resources of care. Please utilize your independent clinical judgment when addressing the question(s) above.     This query and your response, once completed, will be entered into the legal medical record.    Sincerely,  Karlee Latif RN, CCDS  Clinical Documentation Integrity Program   [email or web address]   "

## 2023-04-06 NOTE — OUTREACH NOTE
Prep Survey    Flowsheet Row Responses   [facility] facility patient discharged from? [facility]   Is LACE score < 7 ? No   Eligibility [facility]   Date of Admission 04/03/23   Date of Discharge 04/06/23   Discharge Disposition Home-Health Care Svc   Discharge diagnosis Diabetic foot infection    Does the patient have one of the following disease processes/diagnoses(primary or secondary)? Other   Does the patient have Home health ordered? Yes   What is the Home health agency?  [facility]    Is there a DME ordered? No   Prep survey completed? Yes          CHUCK GRAFF - Registered Nurse

## 2023-04-07 ENCOUNTER — TRANSITIONAL CARE MANAGEMENT TELEPHONE ENCOUNTER (OUTPATIENT)
Dept: CALL CENTER | Facility: HOSPITAL | Age: 63
End: 2023-04-07
Payer: MEDICARE

## 2023-04-07 NOTE — OUTREACH NOTE
Call Center TCM Note    Flowsheet Row Responses   [facility] patient discharged from? [facility]   Does the patient have one of the following disease processes/diagnoses(primary or secondary)? Other   TCM attempt successful? Yes   Call start time 1123   Call end time 1126   Discharge diagnosis Diabetic foot infection    Meds reviewed with patient/caregiver? Yes   Does the patient have all medications ordered at discharge? Yes   Is the patient taking all medications as directed (includes completed medication regime)? No   What is preventing the patient from taking all medications as directed? Other  [Not picked up yet]   Nursing Interventions Nurse provided patient education   Comments Hospital d/c f/u appt is on 4/11/23 at 2:00 PM    Does the patient have an appointment with their PCP within 7 days of discharge? Yes   What is the Home health agency?  [facility]    Has home health visited the patient within 72 hours of discharge? No   Home health comments  is waiting on paperwork from the hospital    Psychosocial issues? No   Did the patient receive a copy of their discharge instructions? Yes   Nursing interventions Reviewed instructions with patient   What is the patient's perception of their health status since discharge? Improving   Is the patient/caregiver able to teach back signs and symptoms related to disease process for when to call PCP? Yes   Is the patient/caregiver able to teach back signs and symptoms related to disease process for when to call 911? Yes   Is the patient/caregiver able to teach back the hierarchy of who to call/visit for symptoms/problems? PCP, Specialist, Home health nurse, Urgent Care, ED, 911 Yes   TCM call completed? Yes   Call end time 1126   Would this patient benefit from a Referral to Amb Social Work? No   Is the patient interested in additional calls from an ambulatory ?  NOTE:  applies to high risk patients requiring additional follow-up. Cindy RAMOS  RONNIE Davis    4/7/2023, 11:26 EDT

## 2023-04-08 LAB
BACTERIA SPEC AEROBE CULT: ABNORMAL
BACTERIA SPEC AEROBE CULT: NORMAL
BACTERIA SPEC AEROBE CULT: NORMAL
GRAM STN SPEC: ABNORMAL

## 2023-04-10 ENCOUNTER — TELEPHONE (OUTPATIENT)
Dept: INTERNAL MEDICINE | Facility: CLINIC | Age: 63
End: 2023-04-10
Payer: MEDICARE

## 2023-04-10 NOTE — TELEPHONE ENCOUNTER
Bryan from home health was wondering if you would like Jose to be assiged a . There is an order for one but he never utilized the care provided from his last one. Please call her if you have any questions.  [phone]

## 2023-04-12 ENCOUNTER — OFFICE VISIT (OUTPATIENT)
Dept: WOUND CARE | Facility: HOSPITAL | Age: 63
End: 2023-04-12
Payer: MEDICARE

## 2023-04-12 VITALS
HEART RATE: 113 BPM | DIASTOLIC BLOOD PRESSURE: 80 MMHG | SYSTOLIC BLOOD PRESSURE: 138 MMHG | RESPIRATION RATE: 18 BRPM | TEMPERATURE: 98.2 F

## 2023-04-12 DIAGNOSIS — E66.01 CLASS 3 SEVERE OBESITY DUE TO EXCESS CALORIES WITH SERIOUS COMORBIDITY AND BODY MASS INDEX (BMI) OF 45.0 TO 49.9 IN ADULT: ICD-10-CM

## 2023-04-12 DIAGNOSIS — Z89.431 STATUS POST AMPUTATION OF RIGHT FOOT THROUGH METATARSAL BONE: ICD-10-CM

## 2023-04-12 DIAGNOSIS — L97.422 DIABETIC ULCER OF LEFT HEEL ASSOCIATED WITH TYPE 2 DIABETES MELLITUS, WITH FAT LAYER EXPOSED: ICD-10-CM

## 2023-04-12 DIAGNOSIS — Z60.9 HIGH RISK SOCIAL SITUATION: ICD-10-CM

## 2023-04-12 DIAGNOSIS — T81.31XD POSTOPERATIVE WOUND DEHISCENCE, SUBSEQUENT ENCOUNTER: Primary | ICD-10-CM

## 2023-04-12 DIAGNOSIS — E11.621 DIABETIC ULCER OF LEFT HEEL ASSOCIATED WITH TYPE 2 DIABETES MELLITUS, WITH FAT LAYER EXPOSED: ICD-10-CM

## 2023-04-12 DIAGNOSIS — M86.671 OTHER CHRONIC OSTEOMYELITIS, RIGHT ANKLE AND FOOT: ICD-10-CM

## 2023-04-12 PROCEDURE — 3079F DIAST BP 80-89 MM HG: CPT | Performed by: EMERGENCY MEDICINE

## 2023-04-12 PROCEDURE — 1159F MED LIST DOCD IN RCRD: CPT | Performed by: EMERGENCY MEDICINE

## 2023-04-12 PROCEDURE — 3075F SYST BP GE 130 - 139MM HG: CPT | Performed by: EMERGENCY MEDICINE

## 2023-04-12 PROCEDURE — 1160F RVW MEDS BY RX/DR IN RCRD: CPT | Performed by: EMERGENCY MEDICINE

## 2023-04-12 SDOH — SOCIAL STABILITY - SOCIAL INSECURITY: PROBLEM RELATED TO SOCIAL ENVIRONMENT, UNSPECIFIED: Z60.9

## 2023-04-12 NOTE — PROGRESS NOTES
Chief Complaint  Wound Check (WOUND CHECK TO BILATERAL FEET - HOSPITAL FOLLOW UP ())    Subjective      Wound Check        Jose Shaikh  is a 62 y.o. diabetic male with a right MTT amputation that is healing by secondary intention.  He also has a wound on the left plantar foot.  He has a difficult social situation due to financial constraints and housing issues.  Patient has a remote prior felony sex offender conviction which makes finding him housing or outpatient rehab placement challenging as most facilities turn him down. Patient now has his own apartment.    He was recently admitted to the hospital from 10/01 to 10/05/2022 for cellulitis and osteomyelitis of the right foot.  MRI revealed osteomyelitis of bones of the midfoot.  CTA showed two-vessel runoff to the foot.  Dr. Fuller did not feel that there was any vascular intervention that would help the patient heal better and that he would most likely need to proceed to a right sided BKA.  Patient declined.  He got 4 weeks of doxycycline and Augmentin.  He has previously undergone hyperbaric oxygen therapy and did not want to do so again.    Unfortunately the patient was feeling very poorly at his last visit and had evidence of wound infection and cellulitis and I was concerned about abscess and worsening osteomyelitis.  He was admitted from 04/03 to 04/06/2023.  Once again a right-sided BKA was recommended as it is evident that the right-sided amputation is not going to heal.  The patient declined this again because they could not guarantee him placement.  They did tell him that the SNF at [facility] except his insurance and would be willing to take him if the insurance approved it but he did not want to proceed with the BKA if it was not guaranteed so decided that he would wait until he has to have it done emergently at some point in the future.    He still has home health and currently the wounds are being painted with Betadine and covered with dry  gauze.  He says he is still finishing up some antibiotics that I gave him and then has a 1 month supply of Cipro and doxycycline that was prescribed at discharge from the hospital.  He continues to have discomfort in his feet from the wounds.  Home health does his dressings twice a week and he tries to do them at least 1 other time but they are hard for him to reach.    Allergies:  Adhesive tape      Current Outpatient Medications:   •  acetaminophen (TYLENOL) 650 MG 8 hr tablet, Take 1 tablet by mouth Every 8 (Eight) Hours As Needed for Mild Pain., Disp: , Rfl:   •  apixaban (ELIQUIS) 5 MG tablet tablet, Take 1 tablet by mouth Every 12 (Twelve) Hours., Disp: 180 tablet, Rfl: 1  •  ciprofloxacin (Cipro) 500 MG tablet, Take 1 tablet by mouth 2 (Two) Times a Day for 30 days., Disp: 60 tablet, Rfl: 0  •  Diclofenac Sodium (VOLTAREN) 1 % gel gel, Apply 4 g topically to the appropriate area as directed 4 (Four) Times a Day., Disp: , Rfl:   •  doxycycline (VIBRAMYCIN) 100 MG capsule, Take 1 capsule by mouth 2 (Two) Times a Day for 30 days., Disp: 60 capsule, Rfl: 0  •  Dulaglutide 3 MG/0.5ML solution pen-injector, Inject 0.5 mg under the skin into the appropriate area as directed 1 (One) Time Per Week., Disp: , Rfl:   •  glucose blood test strip, Test blood sugar 3 times a day, Disp: 100 each, Rfl: 5  •  insulin detemir (Levemir FlexTouch) 100 UNIT/ML injection, Inject 60 Units under the skin into the appropriate area as directed Daily., Disp: 60 mL, Rfl: 1  •  Insulin Lispro (HumaLOG KwikPen) 200 UNIT/ML solution pen-injector, Inject 30 Units under the skin into the appropriate area as directed 3 (Three) Times a Day Before Meals., Disp: 45 mL, Rfl: 1  •  Insulin Pen Needle (Pen Needles) 32G X 5 MM misc, 1 each 4 (Four) Times a Day., Disp: 400 each, Rfl: 1  •  lisinopril (PRINIVIL,ZESTRIL) 20 MG tablet, Take 1 tablet by mouth Daily., Disp: 90 tablet, Rfl: 1  •  meloxicam (MOBIC) 7.5 MG tablet, Take 2 tablets by mouth  Daily., Disp: 90 tablet, Rfl: 1  •  metFORMIN (GLUCOPHAGE) 1000 MG tablet, Take 1 tablet by mouth 2 (Two) Times a Day With Meals., Disp: 360 tablet, Rfl: 1  •  metoprolol succinate XL (TOPROL-XL) 50 MG 24 hr tablet, Take 1 tablet by mouth Daily., Disp: 90 tablet, Rfl: 1  •  Pro Comfort Lancets 31G misc, , Disp: , Rfl:   •  simvastatin (ZOCOR) 20 MG tablet, Take 1 tablet by mouth Every Night., Disp: 90 tablet, Rfl: 1    Past Medical History:   Diagnosis Date   • Absence of toe of right foot    • Acute osteomyelitis of left calcaneus  8/18/2021   • Anxiety and depression    • Arthritis    • Claustrophobia    • Corns and callus    • Diabetic ulcer of left heel associated with type 2 DM 8/18/2021   • Diabetic ulcer of left heel associated with type 2 DM 7/6/2021   • Diabetic ulcer of right midfoot associated with type 2 DM 8/18/2021   • Difficulty walking    • Essential hypertension 8/31/2021   • Hammertoe    • Hyperlipidemia LDL goal <100 8/31/2021   • Ingrown toenail    • Obesity    • Paroxysmal atrial fibrillation 8/31/2021   • Polyneuropathy    • Pressure ulcer, stage 1    • Tinea unguium    • Type 2 diabetes mellitus with polyneuropathy      Past Surgical History:   Procedure Laterality Date   • CYST REMOVAL      center of back; benign   • INCISION AND DRAINAGE ABSCESS      back   • INCISION AND DRAINAGE LEG Right 12/10/2021    Procedure: INCISION AND DRAINAGE LOWER EXTREMITY;  Surgeon: Ash Leyva DPM;  Location: [facility] OR;  Service: Podiatry;  Laterality: Right;   • OTHER SURGICAL HISTORY      Surgical clips left foot   • TOE SURGERY Right     Removal of 5th toe   • TRANS METATARSAL AMPUTATION Right 12/2/2021    Procedure: AMPUTATION TRANS METATARSAL;  Surgeon: Ash Leyva DPM;  Location: [facility] MAIN OR;  Service: Podiatry;  Laterality: Right;   • WRIST SURGERY Left     repair of injury     Social History     Socioeconomic History   • Marital status:    Tobacco Use   • Smoking  status: Former     Packs/day: 0.00     Years: 1.00     Pack years: 0.00     Types: Cigarettes     Quit date: 1991     Years since quittin.6   • Smokeless tobacco: Never   • Tobacco comments:     quit at age 30   Vaping Use   • Vaping Use: Never used   Substance and Sexual Activity   • Alcohol use: Yes     Comment: Rare   • Drug use: Yes     Types: Marijuana     Comment: Daily   • Sexual activity: Defer           Objective     Vitals:    23 1351   BP: 138/80   BP Location: Left arm   Patient Position: Sitting   Pulse: 113   Resp: 18   Temp: 98.2 °F (36.8 °C)   TempSrc: Temporal   PainSc: 10-Worst pain ever  Comment: 10/10 [facility] WALKING ON FEET, 5/10 AT REST   PainLoc: Foot     There is no height or weight on file to calculate BMI.     STEADI Fall Risk Assessment has not been completed.     Review of Systems     ROS:  No fevers, chills, sweats, or body aches.     I have reviewed the HPI and ROS as documented by MA/RN. Shea Daniels MD    Physical Exam     NAD  Normocephalic, atraumatic  EOMI, no scleral icterus  No respiratory distress, no cough  AAOx3, pleasant, cooperative  LLE: Left plantar wound is similar in size but there is decreased maceration on the bottom of the foot.  There continues to be periwound maceration and thick pale rubbery callus with surrounding hemosiderin deposition.  Chronic persistent TTP in and around the wound, particularly lateral aspect.  No evidence of acute infection.    RLE: Right foot wound is larger and there is a large area of exposed bone along the distal medial aspect.  Tissues in the base do appear somewhat healthier.  There is persistent surrounding redness and tenderness diffusely of the foot especially along the plantar and lateral aspect.  Decreased odor, no evidence of acute infection.    See photos for details.    RLE:                  LLE:            Result Review :  The following data was reviewed by: Shea Daniels MD on 2023:    Prior notes and  images.  Discharge summary, vascular surgery notes, podiatry note, MRI right foot, MRI left foot, CBC, CMP, ESR, CRP      Wound debridement  Performed by: Shea Daniels MD  Authorized by: Shea Daniels MD     Correct patient: Identification verified by two methods:     Date of birth and Verbally  Correct procedure/consent    Correct site/side    Correct equipment as applicable    How many wounds are you performing a debridement on?:  2  Wound 1:     Nursing Documentation:     Wound Location:  Right foot  Measurements:     The total amount debrided on this wound was over 20 cm2.     Provider Documentation    Debridement type:  Sharp/excisional    Betadine      Other:  Sterile pickups and 10 blade     None    Tissue debrided:  Moderate    Type tissue:  Slough and Eschar    Level removed:  Subcutaneous    Patient tolerance:  Good    Hemostasis achieved by:  Direct pressure and Silver nitrate    Wound Bed Post Debridement: See Photo      Description:  53.3 cm²  Wound 2:     Wound Location:  Left foot   Measurements:    The total amount debrided on this wound was under 20 cm2.      Provider Documentation:     Debridement type:  Sharp/Excisional    Betadine      Other:  Sterile pickups and 10 blade     None    Tissue debrided:  Moderate    Level removed:  Skin    Patient tolerance:  Good    Hemostasis achieved by:  Direct Pressure and Silver Nitrate    Wound Bed Post Debridement: See Photo                       Assessment and Plan   There are no diagnoses linked to this encounter.    Patient's wounds are chronic and severe.  The right foot wounds are larger and there is a new and increased area of exposed bone along the medial aspect.  Bases of most of the wounds appear healthier than prior to his hospitalization.  We will have him continue applying Betadine soaked gauze to the wound on the right foot and packing the left foot with Betadine soaked gauze.  This should be done every day.  Home health to continue  treating the patient twice a week and patient to do the wraps the rest of the time.      After the patient left the clinic I had more time to review his admission records.  The bacteria from his wound cultures was only a minimal amount but was resistant to tetracycline/doxycycline.  All the bacteria were susceptible to Levaquin.  Patient was sent home with doxycycline and ciprofloxacin 500 mg twice daily.  I spoke with Ke Yoon, infectious disease pharmacist, who reviewed the microbiology results and states that the bacteria are susceptible to the Cipro but given the patient's weight may need a higher dose and there may be better compliance by using Levaquin 750 mg daily instead.  I tried to contact the patient by calling him 3 different times and left him a message to call me back to discuss this as he had said he had not started the hospital antibiotics yet.  Unfortunately I did not hear back so hopefully the dosing of Cipro that he is on will be sufficient if we are unable to reach him.    Nonweightbearing of wounds is much as possible.    Recheck 4 weeks.    Patient was given instructions and counseling regarding their condition or for health maintenance advice, as well as the wound care plan and recommendations. They understand and agree with the plan.  They will follow back up here in the clinic but are instructed to contact us in the interim should they have any new, returning, or worsening symptoms or concerns. Please see specific information pulled into the AVS if appropriate.     Dragon Dictation utilized for chart completion.      Follow Up   No follow-ups on file.      Shea Daniels MD

## 2023-04-14 ENCOUNTER — READMISSION MANAGEMENT (OUTPATIENT)
Dept: CALL CENTER | Facility: HOSPITAL | Age: 63
End: 2023-04-14
Payer: MEDICARE

## 2023-04-14 NOTE — OUTREACH NOTE
Medical Week 2 Survey    Flowsheet Row Responses   [facility] patient discharged from? [facility]   Does the patient have one of the following disease processes/diagnoses(primary or secondary)? Other   Week 2 attempt successful? Yes   Call start time 0850   Discharge diagnosis Diabetic foot infection    Call end time 0855   Meds reviewed with patient/caregiver? Yes   Is the patient having any side effects they believe may be caused by any medication additions or changes? No   Does the patient have all medications ordered at discharge? Yes   Is the patient taking all medications as directed (includes completed medication regime)? Yes   Medication comments Pt is still taking ATBs--received one month supply   Comments regarding appointments Pt has wound care on 4/12/23   Does the patient have a primary care provider?  Yes   Does the patient have an appointment with their PCP within 7 days of discharge? Greater than 7 days   Comments regarding PCP Hospital f/u on 4/19/23   Nursing Interventions Verified appointment date/time/provider   Has the patient kept scheduled appointments due by today? N/A   What is the Home health agency?  [facility]    Home health comments HH is visting twice weekly   Psychosocial issues? No   Did the patient receive a copy of their discharge instructions? Yes   Nursing interventions Reviewed instructions with patient   What is the patient's perception of their health status since discharge? Improving  [pt reports yesterday was a good day for him--feeling better.  Pt has been to wound care, has HH assisting with wound mgmt and v/u of increased s/s of wound infection .]   Is the patient/caregiver able to teach back signs and symptoms related to disease process for when to call PCP? Yes   Is the patient/caregiver able to teach back signs and symptoms related to disease process for when to call 911? Yes   If the patient is a current smoker, are they able to teach back resources for cessation? Not a  smoker   Week 2 Call Completed? Yes   Graduated Yes  [Denies needs for further calls--HH is following patient]          PRIETO ARIAS - Registered Nurse

## 2023-04-19 ENCOUNTER — OFFICE VISIT (OUTPATIENT)
Dept: INTERNAL MEDICINE | Facility: CLINIC | Age: 63
End: 2023-04-19
Payer: MEDICARE

## 2023-04-19 VITALS
HEART RATE: 79 BPM | OXYGEN SATURATION: 97 % | RESPIRATION RATE: 18 BRPM | HEIGHT: 73 IN | BODY MASS INDEX: 41.75 KG/M2 | DIASTOLIC BLOOD PRESSURE: 78 MMHG | SYSTOLIC BLOOD PRESSURE: 124 MMHG | WEIGHT: 315 LBS | TEMPERATURE: 97.6 F

## 2023-04-19 DIAGNOSIS — M25.552 CHRONIC LEFT HIP PAIN: Primary | ICD-10-CM

## 2023-04-19 DIAGNOSIS — Z89.431 PARTIAL NONTRAUMATIC AMPUTATION OF RIGHT FOOT: ICD-10-CM

## 2023-04-19 DIAGNOSIS — L97.509 TYPE 2 DIABETES MELLITUS WITH FOOT ULCER, WITH LONG-TERM CURRENT USE OF INSULIN: ICD-10-CM

## 2023-04-19 DIAGNOSIS — E66.01 CLASS 3 SEVERE OBESITY DUE TO EXCESS CALORIES WITH SERIOUS COMORBIDITY AND BODY MASS INDEX (BMI) OF 45.0 TO 49.9 IN ADULT: Chronic | ICD-10-CM

## 2023-04-19 DIAGNOSIS — E11.621 TYPE 2 DIABETES MELLITUS WITH FOOT ULCER, WITH LONG-TERM CURRENT USE OF INSULIN: ICD-10-CM

## 2023-04-19 DIAGNOSIS — G89.29 CHRONIC LEFT HIP PAIN: Primary | ICD-10-CM

## 2023-04-19 DIAGNOSIS — Z79.4 TYPE 2 DIABETES MELLITUS WITH FOOT ULCER, WITH LONG-TERM CURRENT USE OF INSULIN: ICD-10-CM

## 2023-04-19 LAB
ALBUMIN SERPL-MCNC: 3.5 G/DL (ref 3.5–5.2)
ALBUMIN/GLOB SERPL: 0.9 G/DL
ALP SERPL-CCNC: 160 U/L (ref 39–117)
ALT SERPL W P-5'-P-CCNC: 31 U/L (ref 1–41)
ANION GAP SERPL CALCULATED.3IONS-SCNC: 9.9 MMOL/L (ref 5–15)
AST SERPL-CCNC: 47 U/L (ref 1–40)
BASOPHILS # BLD AUTO: 0.05 10*3/MM3 (ref 0–0.2)
BASOPHILS NFR BLD AUTO: 1.2 % (ref 0–1.5)
BILIRUB SERPL-MCNC: 0.5 MG/DL (ref 0–1.2)
BUN SERPL-MCNC: 17 MG/DL (ref 8–23)
BUN/CREAT SERPL: 28.3 (ref 7–25)
CALCIUM SPEC-SCNC: 9.3 MG/DL (ref 8.6–10.5)
CHLORIDE SERPL-SCNC: 106 MMOL/L (ref 98–107)
CHOLEST SERPL-MCNC: 134 MG/DL (ref 0–200)
CO2 SERPL-SCNC: 25.1 MMOL/L (ref 22–29)
CREAT SERPL-MCNC: 0.6 MG/DL (ref 0.76–1.27)
DEPRECATED RDW RBC AUTO: 43.2 FL (ref 37–54)
EGFRCR SERPLBLD CKD-EPI 2021: 107.8 ML/MIN/1.73
EOSINOPHIL # BLD AUTO: 0.09 10*3/MM3 (ref 0–0.4)
EOSINOPHIL NFR BLD AUTO: 2.1 % (ref 0.3–6.2)
ERYTHROCYTE [DISTWIDTH] IN BLOOD BY AUTOMATED COUNT: 14.6 % (ref 12.3–15.4)
GLOBULIN UR ELPH-MCNC: 3.9 GM/DL
GLUCOSE SERPL-MCNC: 247 MG/DL (ref 65–99)
HBA1C MFR BLD: 6.6 % (ref 4.8–5.6)
HCT VFR BLD AUTO: 39.5 % (ref 37.5–51)
HDLC SERPL-MCNC: 52 MG/DL (ref 40–60)
HGB BLD-MCNC: 12.4 G/DL (ref 13–17.7)
LDLC SERPL CALC-MCNC: 68 MG/DL (ref 0–100)
LDLC/HDLC SERPL: 1.31 {RATIO}
LYMPHOCYTES # BLD AUTO: 0.82 10*3/MM3 (ref 0.7–3.1)
LYMPHOCYTES NFR BLD AUTO: 19.2 % (ref 19.6–45.3)
MCH RBC QN AUTO: 25.6 PG (ref 26.6–33)
MCHC RBC AUTO-ENTMCNC: 31.4 G/DL (ref 31.5–35.7)
MCV RBC AUTO: 81.4 FL (ref 79–97)
MONOCYTES # BLD AUTO: 0.43 10*3/MM3 (ref 0.1–0.9)
MONOCYTES NFR BLD AUTO: 10.1 % (ref 5–12)
NEUTROPHILS NFR BLD AUTO: 2.87 10*3/MM3 (ref 1.7–7)
NEUTROPHILS NFR BLD AUTO: 67.2 % (ref 42.7–76)
PLATELET # BLD AUTO: 118 10*3/MM3 (ref 140–450)
POTASSIUM SERPL-SCNC: 4.5 MMOL/L (ref 3.5–5.2)
PROT SERPL-MCNC: 7.4 G/DL (ref 6–8.5)
RBC # BLD AUTO: 4.85 10*6/MM3 (ref 4.14–5.8)
SODIUM SERPL-SCNC: 141 MMOL/L (ref 136–145)
TRIGL SERPL-MCNC: 69 MG/DL (ref 0–150)
TSH SERPL DL<=0.05 MIU/L-ACNC: 2.7 UIU/ML (ref 0.27–4.2)
VLDLC SERPL-MCNC: 14 MG/DL (ref 5–40)
WBC NRBC COR # BLD: 4.27 10*3/MM3 (ref 3.4–10.8)

## 2023-04-19 PROCEDURE — 83036 HEMOGLOBIN GLYCOSYLATED A1C: CPT | Performed by: NURSE PRACTITIONER

## 2023-04-19 PROCEDURE — 84443 ASSAY THYROID STIM HORMONE: CPT | Performed by: NURSE PRACTITIONER

## 2023-04-19 PROCEDURE — 80053 COMPREHEN METABOLIC PANEL: CPT | Performed by: NURSE PRACTITIONER

## 2023-04-19 PROCEDURE — 80061 LIPID PANEL: CPT | Performed by: NURSE PRACTITIONER

## 2023-04-19 PROCEDURE — 85025 COMPLETE CBC W/AUTO DIFF WBC: CPT | Performed by: NURSE PRACTITIONER

## 2023-04-19 NOTE — PROGRESS NOTES
Chief Complaint  Hospital Follow Up Visit (diabetic foot infection and subacute osteomyelitis of right foot//) and Hip Pain (Left- spasms when laying down )    Subjective          Jose Shaikh presents to [facility]  History of Present Illness  Recent hospitalization. D/c on 4/6/23.    Hospital Course:  Jose Shaikh is a 62 y.o. male male with past medical history stable for previous history of osteomyelitis status post transmetatarsal amputation of the right foot, paroxysmal atrial fibrillation on Eliquis, essential hypertension, hyperlipidemia, depression, and anxiety who presents with chief complaint of worsening right foot infection.  Patient underwent previous transmetatarsal amputation.  Patient is followed by the wound care clinic.  Patient was admitted to the hospital started on IV antibiotics.  Patient lives by himself.  Patient has had these chronic wounds on bilateral feet for quite some time.  Patient also is a convicted felon.  Patient is a sex offender therefore it is difficult to find any rehab placement because most places will not take him due to his past convictions.  Patient was seen by podiatry who states that his left foot wound is stable however his right lower extremity does show concern for osteomyelitis and that that right foot is not salvageable and podiatry did recommend amputation.  Vascular surgery was consulted.  Patient spoke with vascular surgery however at this time does not want to go ahead with amputation as he is concerned about not being able to have rehab placement although I did discuss with him that our skilled nursing facility does take his insurance and would be open to accepting patient to the facility should his insurance approve a rehab stay.  However patient continues to refuse amputation.  Patient is aware that his wounds will never heal.  Patient is aware that they will continue to get worse however he would like to go  "ahead and go home and continue with oral antibiotics and wound care until the time comes when he needs an immediate amputation.  At this time patient will be discharged home in stable condition.  Patient is aware of the risks such as worsening infection and death should he not get his wounds taken care of appropriately which would include surgical amputation.    He has follow up with wound care clinic next week and vascular surgery 5/8/23    He reports feeling much better. Reports weakness at discharge but this has improved.  Denies fever, chills, body aches  Denies worsening infection  HH coming twice weekly healing well    He would like to see pain management due to ongoing hip pain and right foot pain/amputation. Reports previous imaging of hip    Objective   Vital Signs:   /78 (BP Location: Left arm, Patient Position: Sitting, Cuff Size: Adult)   Pulse 79   Temp 97.6 °F (36.4 °C)   Resp 18   Ht 185.4 cm (73\")   Wt (!) 163 kg (359 lb)   SpO2 97%   BMI 47.36 kg/m²     Physical Exam  Vitals and nursing note reviewed.   Constitutional:       General: He is not in acute distress.     Appearance: Normal appearance.   HENT:      Head: Normocephalic and atraumatic.      Right Ear: External ear normal.      Left Ear: External ear normal.      Nose: Nose normal.      Mouth/Throat:      Mouth: Mucous membranes are moist.   Eyes:      Conjunctiva/sclera: Conjunctivae normal.   Cardiovascular:      Rate and Rhythm: Normal rate and regular rhythm.      Pulses: Normal pulses.      Heart sounds: Normal heart sounds. No murmur heard.    No friction rub. No gallop.   Pulmonary:      Effort: Pulmonary effort is normal. No respiratory distress.      Breath sounds: No wheezing, rhonchi or rales.   Musculoskeletal:      Cervical back: Neck supple.      Right lower leg: No edema.      Left lower leg: No edema.   Skin:     General: Skin is warm and dry.   Neurological:      General: No focal deficit present.      Mental " Status: He is alert and oriented to person, place, and time.   Psychiatric:         Mood and Affect: Mood normal.         Behavior: Behavior normal.        Result Review :          Procedures      Assessment and Plan    Diagnoses and all orders for this visit:    1. Chronic left hip pain (Primary)  Comments:  will send to pain management   Orders:  -     Ambulatory Referral to Pain Management    2. Partial nontraumatic amputation of right foot  Comments:  he will cont to follow up with wound care and vascular surgery. HH to continue  Orders:  -     Ambulatory Referral to Pain Management    3. Class 3 severe obesity due to excess calories with serious comorbidity and body mass index (BMI) of 45.0 to 49.9 in adult  Comments:  Discussed possibly increasing Trulicity to 4.5 pending labs.  Continue with diet modification.    4. Type 2 diabetes mellitus with foot ulcer, with long-term current use of insulin  Comments:  We will check labs in the office today.  Will review and adjust medications as needed  Orders:  -     CBC & Differential  -     Comprehensive Metabolic Panel  -     Hemoglobin A1c  -     Lipid Panel  -     TSH              Follow Up   Return in about 8 weeks (around 6/14/2023) for Medicare Wellness.  Patient was given instructions and counseling regarding his condition or for health maintenance advice. Please see specific information pulled into the AVS if appropriate.

## 2023-05-06 ENCOUNTER — APPOINTMENT (OUTPATIENT)
Dept: CT IMAGING | Facility: HOSPITAL | Age: 63
End: 2023-05-06
Payer: MEDICARE

## 2023-05-06 ENCOUNTER — HOSPITAL ENCOUNTER (EMERGENCY)
Facility: HOSPITAL | Age: 63
Discharge: HOME | End: 2023-05-06
Attending: EMERGENCY MEDICINE
Payer: MEDICARE

## 2023-05-06 ENCOUNTER — APPOINTMENT (OUTPATIENT)
Dept: GENERAL RADIOLOGY | Facility: HOSPITAL | Age: 63
End: 2023-05-06
Payer: MEDICARE

## 2023-05-06 VITALS
RESPIRATION RATE: 18 BRPM | BODY MASS INDEX: 40.43 KG/M2 | OXYGEN SATURATION: 96 % | HEIGHT: 74 IN | HEART RATE: 86 BPM | TEMPERATURE: 100.3 F | DIASTOLIC BLOOD PRESSURE: 60 MMHG | WEIGHT: 315 LBS | SYSTOLIC BLOOD PRESSURE: 120 MMHG

## 2023-05-06 DIAGNOSIS — S70.02XA CONTUSION OF LEFT HIP, INITIAL ENCOUNTER: ICD-10-CM
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
## 2023-05-06 DIAGNOSIS — S80.02XA CONTUSION OF LEFT KNEE, INITIAL ENCOUNTER: ICD-10-CM

## 2023-05-06 PROCEDURE — 99284 EMERGENCY DEPT VISIT MOD MDM: CPT

## 2023-05-06 NOTE — DISCHARGE INSTRUCTIONS
Ice to affected areas.    Tylenol for pain.    Follow-up with your orthopedic doctor to discuss your left knee and hip pain.    Return to emergency department for any new or worsening symptoms

## 2023-05-06 NOTE — ED PROVIDER NOTES
Time: 3:18 AM EDT  Date of encounter:  5/6/2023  Independent Historian/Clinical History and Information was obtained by:   Patient and EMS  Chief Complaint: Fall out of bed    History is limited by: N/A    History of Present Illness:  Patient is a 62 y.o. year old male who presents to the emergency department for evaluation of fall from bed.  Patient states he was trying to get out of bed and he was trying to show me his body to the side but did not realize the mattress was hanging off the frame so when he got to the edge mattress gave way and he fell into the floor landing on his left side.  Patient denies hitting his head as he remembers.  Patient denies LOC.  Patient had complained of left hip and knee pain.  EMS reported that the patient had laid on the floor for 30 minutes before his life alert was activated.  Patient denies any numbness or tingling.  Patient denies any severe headache or visual disturbance.  Patient has no vomiting.    HPI    Patient Care Team  Primary Care Provider: Delia Cervantes APRN    Past Medical History:     Allergies   Allergen Reactions   • Adhesive Tape Rash     Past Medical History:   Diagnosis Date   • Absence of toe of right foot    • Acute osteomyelitis of left calcaneus  8/18/2021   • Anxiety and depression    • Arthritis    • Claustrophobia    • Corns and callus    • Diabetic ulcer of left heel associated with type 2 DM 8/18/2021   • Diabetic ulcer of left heel associated with type 2 DM 7/6/2021   • Diabetic ulcer of right midfoot associated with type 2 DM 8/18/2021   • Difficulty walking    • Essential hypertension 8/31/2021   • Hammertoe    • Hyperlipidemia LDL goal <100 8/31/2021   • Ingrown toenail    • Obesity    • Paroxysmal atrial fibrillation 8/31/2021   • Polyneuropathy    • Pressure ulcer, stage 1    • Tinea unguium    • Type 2 diabetes mellitus with polyneuropathy      Past Surgical History:   Procedure Laterality Date   • CYST REMOVAL      center of back; benign    • INCISION AND DRAINAGE ABSCESS      back   • INCISION AND DRAINAGE LEG Right 12/10/2021    Procedure: INCISION AND DRAINAGE LOWER EXTREMITY;  Surgeon: Ash Leyva DPM;  Location: [facility] MAIN OR;  Service: Podiatry;  Laterality: Right;   • OTHER SURGICAL HISTORY      Surgical clips left foot   • TOE SURGERY Right     Removal of 5th toe   • TRANS METATARSAL AMPUTATION Right 12/2/2021    Procedure: AMPUTATION TRANS METATARSAL;  Surgeon: Ash Leyva DPM;  Location: [facility] MAIN OR;  Service: Podiatry;  Laterality: Right;   • WRIST SURGERY Left     repair of injury     Family History   Problem Relation Age of Onset   • Heart disease Mother    • Heart disease Father    • Cancer Father         Unspecified   • Heart disease Brother        Home Medications:  Prior to Admission medications    Medication Sig Start Date End Date Taking? Authorizing Provider   acetaminophen (TYLENOL) 650 MG 8 hr tablet Take 1 tablet by mouth Every 8 (Eight) Hours As Needed for Mild Pain.    ProviderLaura MD   apixaban (ELIQUIS) 5 MG tablet tablet Take 1 tablet by mouth Every 12 (Twelve) Hours. 1/31/23   Delia Cervantes APRN   ciprofloxacin (Cipro) 500 MG tablet Take 1 tablet by mouth 2 (Two) Times a Day for 30 days. 4/6/23 5/6/23  Bandar Sheth DO   doxycycline (VIBRAMYCIN) 100 MG capsule Take 1 capsule by mouth 2 (Two) Times a Day for 30 days. 4/6/23 5/6/23  Bandar Sheth DO   Dulaglutide 3 MG/0.5ML solution pen-injector Inject 0.5 mg under the skin into the appropriate area as directed 1 (One) Time Per Week. 3/6/23   ProviderLuara MD   glucose blood test strip Test blood sugar 3 times a day 3/15/23   Kami Garcia APRN   insulin detemir (Levemir FlexTouch) 100 UNIT/ML injection Inject 60 Units under the skin into the appropriate area as directed Daily. 1/27/23   Delia Cervantes APRN   Insulin Lispro (HumaLOG KwikPen) 200 UNIT/ML solution pen-injector Inject 30 Units under the skin into the  "appropriate area as directed 3 (Three) Times a Day Before Meals. 22   Kami Garcia APRN   Insulin Pen Needle (Pen Needles) 32G X 5 MM misc 1 each 4 (Four) Times a Day. 22   Kami Garcia APRN   lisinopril (PRINIVIL,ZESTRIL) 20 MG tablet Take 1 tablet by mouth Daily.  Patient not taking: Reported on 2023   Delia Cervantes APRN   meloxicam (MOBIC) 7.5 MG tablet Take 2 tablets by mouth Daily. 3/17/23   Max Parkinson MD   metFORMIN (GLUCOPHAGE) 1000 MG tablet Take 1 tablet by mouth 2 (Two) Times a Day With Meals.  Patient not taking: Reported on 2023   Delia Cervantes APRN   metoprolol succinate XL (TOPROL-XL) 50 MG 24 hr tablet Take 1 tablet by mouth Daily.  Patient not taking: Reported on 2023   Delia Cervantes APRN   Pro Comfort Lancets 31G misc  22   Provider, MD Laura   simvastatin (ZOCOR) 20 MG tablet Take 1 tablet by mouth Every Night. 23   Delia Cervantes APRN        Social History:   Social History     Tobacco Use   • Smoking status: Former     Packs/day: 0.00     Years: 1.00     Pack years: 0.00     Types: Cigarettes     Quit date: 1991     Years since quittin.7   • Smokeless tobacco: Never   • Tobacco comments:     quit at age 30   Vaping Use   • Vaping Use: Never used   Substance Use Topics   • Alcohol use: Yes     Comment: Rare   • Drug use: Yes     Types: Marijuana     Comment: Daily         Review of Systems:  Review of Systems   HENT: Negative for facial swelling.    Eyes: Negative for photophobia and visual disturbance.   Respiratory: Negative for shortness of breath.    Cardiovascular: Negative for chest pain.   Gastrointestinal: Negative for abdominal pain.   Genitourinary: Negative for flank pain.   Musculoskeletal: Positive for arthralgias ( Left knee and hip.  Patient has chronic issues and sees Dr. Parkinson for \"bone-on-bone\") and gait problem ( Patient normally uses a walker due to knee and hip pain). " "Negative for joint swelling.   Skin: Negative.    Neurological: Negative for dizziness, weakness, light-headedness, numbness and headaches.   Hematological: Negative.    Psychiatric/Behavioral: Negative.    All other systems reviewed and are negative.       Physical Exam:  /60 (BP Location: Left arm, Patient Position: Lying)   Pulse 85   Temp 100.3 °F (37.9 °C) (Oral)   Resp 18   Ht 188 cm (74\")   Wt (!) 170 kg (375 lb 8 oz)   SpO2 93%   BMI 48.21 kg/m²     Physical Exam  Vitals and nursing note reviewed.   Constitutional:       General: He is not in acute distress.     Appearance: Normal appearance. He is not toxic-appearing.   HENT:      Head: Normocephalic and atraumatic.      Right Ear: Tympanic membrane, ear canal and external ear normal.      Left Ear: Tympanic membrane, ear canal and external ear normal.      Nose: Nose normal.      Mouth/Throat:      Mouth: Mucous membranes are moist.   Eyes:      General: No scleral icterus.     Extraocular Movements: Extraocular movements intact.      Conjunctiva/sclera: Conjunctivae normal.      Pupils: Pupils are equal, round, and reactive to light.   Cardiovascular:      Rate and Rhythm: Normal rate and regular rhythm.      Heart sounds: Normal heart sounds.   Pulmonary:      Effort: Pulmonary effort is normal. No respiratory distress.      Breath sounds: Normal breath sounds.   Chest:      Chest wall: No tenderness.   Abdominal:      General: Bowel sounds are normal. There is no distension.      Palpations: Abdomen is soft.      Tenderness: There is no abdominal tenderness.   Musculoskeletal:         General: Tenderness ( Left lateral hip and left patella and left lateral knee) present. Normal range of motion.      Cervical back: Normal range of motion and neck supple. No tenderness.   Skin:     General: Skin is warm and dry.      Capillary Refill: Capillary refill takes less than 2 seconds.   Neurological:      Mental Status: He is alert and oriented to " person, place, and time.      Cranial Nerves: No cranial nerve deficit.      Sensory: No sensory deficit.      Motor: No weakness.   Psychiatric:         Mood and Affect: Mood normal.         Behavior: Behavior normal.                  Procedures:  Procedures      Medical Decision Making:      Comorbidities that affect care:    Diabetes, Hypertension, Substance Abuse, Obesity    External Notes reviewed:    Previous Clinic Note: Patient was most recently seen April 19 with Delia Cervantes for chronic left hip pain      The following orders were placed and all results were independently analyzed by me:  Orders Placed This Encounter   Procedures   • CT Head Without Contrast   • XR Hip With or Without Pelvis 2 - 3 View Left   • XR Knee 3 View Left       Medications Given in the Emergency Department:  Medications - No data to display     ED Course:    ED Course as of 05/06/23 0445   Sat May 06, 2023   0326 Patient refusing all imaging.  Patient states he did not hit his head at all despite falling onto the floor on his left side. [DS]   0409 Patient states he does not want imaging on his hip and knee because the pain is the same as it always is and he has already been told it is bone-on-bone and that he needs replacements.  Patient sees Dr. Parkinson.  [DS]   0431 Patient able to ambulate in the hallway [DS]      ED Course User Index  [DS] Maude, Yudelka, VALENTÍN       Labs:    Lab Results (last 24 hours)     ** No results found for the last 24 hours. **           Imaging:    No Radiology Exams Resulted Within Past 24 Hours      Differential Diagnosis and Discussion:    Extremity Pain: Differential diagnosis includes but is not limited to soft tissue sprain, tendonitis, tendon injury, dislocation, fracture, deep vein thrombosis, arterial insufficiency, osteoarthritis, bursitis, and ligamentous damage.        MDM  Number of Diagnoses or Management Options  Contusion of left hip, initial encounter  Contusion of left knee, initial  encounter  Fall, initial encounter  Diagnosis management comments: I have explained the patient´s condition, diagnoses and treatment plan based on the information available to me at this time. I have answered questions and addressed any concerns. The patient has a good  understanding of the patient´s diagnosis, condition, and treatment plan as can be expected at this point. The vital signs have been stable. The patient´s condition is stable and appropriate for discharge from the emergency department.      The patient will pursue further outpatient evaluation with the primary care physician or other designated or consulting physician as outlined in the discharge instructions. They are agreeable to this plan of care and follow-up instructions have been explained in detail. The patient has received these instructions in written format and have expressed an understanding of the discharge instructions. The patient is aware that any significant change in condition or worsening of symptoms should prompt an immediate return to this or the closest emergency department or call to 911.         Amount and/or Complexity of Data Reviewed  Tests in the radiology section of CPT®: ordered    Risk of Complications, Morbidity, and/or Mortality  Presenting problems: moderate  Diagnostic procedures: low  Management options: low    Patient Progress  Patient progress: stable       Patient Care Considerations:    I considered ordering x-rays and head CT but the patient has refused      Consultants/Shared Management Plan:    None    Social Determinants of Health:    Patient is independent, reliable, and has access to care.       Disposition and Care Coordination:    Discharged: The patient is suitable and stable for discharge with no need for consideration of observation or admission.    I have explained the patient´s condition, diagnoses and treatment plan based on the information available to me at this time. I have answered questions and  addressed any concerns. The patient has a good  understanding of the patient´s diagnosis, condition, and treatment plan as can be expected at this point. The vital signs have been stable. The patient´s condition is stable and appropriate for discharge from the emergency department.      The patient will pursue further outpatient evaluation with the primary care physician or other designated or consulting physician as outlined in the discharge instructions. They are agreeable to this plan of care and follow-up instructions have been explained in detail. The patient has received these instructions in written format and have expressed an understanding of the discharge instructions. The patient is aware that any significant change in condition or worsening of symptoms should prompt an immediate return to this or the closest emergency department or call to 911.    Final diagnoses:   Fall, initial encounter   Contusion of left hip, initial encounter   Contusion of left knee, initial encounter        ED Disposition     ED Disposition   Discharge    Condition   Stable    Comment   --             This medical record created using voice recognition software.           Yudelka Santoro, VALENTÍN  05/06/23 0440

## 2023-05-08 ENCOUNTER — TELEPHONE (OUTPATIENT)
Dept: WOUND CARE | Facility: HOSPITAL | Age: 63
End: 2023-05-08
Payer: MEDICARE

## 2023-05-08 NOTE — TELEPHONE ENCOUNTER
" nurse, Nancy, called stating pt had a fever (102.7)  today (5/8/23), nurse stated she encouraged pt to got to ER. Pt refused to go to the ER.     Nancy stated the pt later called and stated the \"fever went down\".      "

## 2023-05-10 ENCOUNTER — EXTERNAL PBMM DATA (OUTPATIENT)
Dept: PHARMACY | Facility: OTHER | Age: 63
End: 2023-05-10
Payer: MEDICARE

## 2023-05-12 ENCOUNTER — APPOINTMENT (OUTPATIENT)
Dept: GENERAL RADIOLOGY | Facility: HOSPITAL | Age: 63
End: 2023-05-12
Payer: MEDICARE

## 2023-05-12 ENCOUNTER — HOSPITAL ENCOUNTER (INPATIENT)
Facility: HOSPITAL | Age: 63
LOS: 34 days | Discharge: HOME HEALTH | End: 2023-06-15
Attending: EMERGENCY MEDICINE | Admitting: INTERNAL MEDICINE
Payer: MEDICARE

## 2023-05-12 DIAGNOSIS — J18.9 PNEUMONIA OF RIGHT UPPER LOBE DUE TO INFECTIOUS ORGANISM: ICD-10-CM

## 2023-05-12 DIAGNOSIS — M79.672 FOOT PAIN, LEFT: ICD-10-CM

## 2023-05-12 DIAGNOSIS — M86.171 ACUTE OSTEOMYELITIS OF RIGHT FOOT: ICD-10-CM

## 2023-05-12 DIAGNOSIS — I48.91 ATRIAL FIBRILLATION WITH RAPID VENTRICULAR RESPONSE: ICD-10-CM

## 2023-05-12 DIAGNOSIS — M86.271 SUBACUTE OSTEOMYELITIS OF RIGHT FOOT: ICD-10-CM

## 2023-05-12 DIAGNOSIS — R26.2 DIFFICULTY IN WALKING: ICD-10-CM

## 2023-05-12 DIAGNOSIS — R50.9 ACUTE FEBRILE ILLNESS: Primary | ICD-10-CM

## 2023-05-12 DIAGNOSIS — Z78.9 DECREASED ACTIVITIES OF DAILY LIVING (ADL): ICD-10-CM

## 2023-05-12 DIAGNOSIS — A41.9 SEPSIS WITHOUT ACUTE ORGAN DYSFUNCTION, DUE TO UNSPECIFIED ORGANISM: ICD-10-CM

## 2023-05-12 DIAGNOSIS — E11.42 DIABETIC PERIPHERAL NEUROPATHY ASSOCIATED WITH TYPE 2 DIABETES MELLITUS: ICD-10-CM

## 2023-05-12 DIAGNOSIS — E11.65 UNCONTROLLED TYPE 2 DIABETES MELLITUS WITH HYPERGLYCEMIA: ICD-10-CM

## 2023-05-12 PROBLEM — M79.671 RIGHT FOOT PAIN: Status: ACTIVE | Noted: 2023-05-12

## 2023-05-12 LAB
ACETONE BLD QL: NEGATIVE
ALBUMIN SERPL-MCNC: 2.6 G/DL (ref 3.5–5.2)
ALBUMIN/GLOB SERPL: 0.7 G/DL
ALP SERPL-CCNC: 162 U/L (ref 39–117)
ALT SERPL W P-5'-P-CCNC: 23 U/L (ref 1–41)
AMORPH URATE CRY URNS QL MICRO: ABNORMAL /HPF
ANION GAP SERPL CALCULATED.3IONS-SCNC: 12.1 MMOL/L (ref 5–15)
AST SERPL-CCNC: 31 U/L (ref 1–40)
BACTERIA UR QL AUTO: ABNORMAL /HPF
BASOPHILS # BLD AUTO: 0.06 10*3/MM3 (ref 0–0.2)
BASOPHILS NFR BLD AUTO: 0.3 % (ref 0–1.5)
BILIRUB SERPL-MCNC: 1.5 MG/DL (ref 0–1.2)
BILIRUB UR QL STRIP: ABNORMAL
BUN SERPL-MCNC: 15 MG/DL (ref 8–23)
BUN/CREAT SERPL: 19.7 (ref 7–25)
CALCIUM SPEC-SCNC: 8.2 MG/DL (ref 8.6–10.5)
CHLORIDE SERPL-SCNC: 98 MMOL/L (ref 98–107)
CLARITY UR: ABNORMAL
CO2 SERPL-SCNC: 19.9 MMOL/L (ref 22–29)
COLOR UR: ABNORMAL
CREAT SERPL-MCNC: 0.76 MG/DL (ref 0.76–1.27)
CRP SERPL-MCNC: 20.53 MG/DL (ref 0–0.5)
D-LACTATE SERPL-SCNC: 2 MMOL/L (ref 0.5–2)
D-LACTATE SERPL-SCNC: 2.6 MMOL/L (ref 0.5–2)
DEPRECATED RDW RBC AUTO: 45 FL (ref 37–54)
EGFRCR SERPLBLD CKD-EPI 2021: 100.4 ML/MIN/1.73
EOSINOPHIL # BLD AUTO: 0.14 10*3/MM3 (ref 0–0.4)
EOSINOPHIL NFR BLD AUTO: 0.7 % (ref 0.3–6.2)
ERYTHROCYTE [DISTWIDTH] IN BLOOD BY AUTOMATED COUNT: 16.5 % (ref 12.3–15.4)
ERYTHROCYTE [SEDIMENTATION RATE] IN BLOOD: 75 MM/HR (ref 0–20)
FLUAV AG NPH QL: NEGATIVE
FLUBV AG NPH QL IA: NEGATIVE
GLOBULIN UR ELPH-MCNC: 3.9 GM/DL
GLUCOSE BLDC GLUCOMTR-MCNC: 185 MG/DL (ref 70–99)
GLUCOSE SERPL-MCNC: 214 MG/DL (ref 65–99)
GLUCOSE UR STRIP-MCNC: ABNORMAL MG/DL
HCT VFR BLD AUTO: 35.6 % (ref 37.5–51)
HGB BLD-MCNC: 11.5 G/DL (ref 13–17.7)
HGB UR QL STRIP.AUTO: NEGATIVE
HYALINE CASTS UR QL AUTO: ABNORMAL /LPF
IMM GRANULOCYTES # BLD AUTO: 0.27 10*3/MM3 (ref 0–0.05)
IMM GRANULOCYTES NFR BLD AUTO: 1.3 % (ref 0–0.5)
KETONES UR QL STRIP: ABNORMAL
L PNEUMO1 AG UR QL IA: NEGATIVE
LEUKOCYTE ESTERASE UR QL STRIP.AUTO: ABNORMAL
LYMPHOCYTES # BLD AUTO: 0.76 10*3/MM3 (ref 0.7–3.1)
LYMPHOCYTES NFR BLD AUTO: 3.7 % (ref 19.6–45.3)
MCH RBC QN AUTO: 24.9 PG (ref 26.6–33)
MCHC RBC AUTO-ENTMCNC: 32.3 G/DL (ref 31.5–35.7)
MCV RBC AUTO: 77.1 FL (ref 79–97)
MONOCYTES # BLD AUTO: 1.19 10*3/MM3 (ref 0.1–0.9)
MONOCYTES NFR BLD AUTO: 5.8 % (ref 5–12)
NEUTROPHILS NFR BLD AUTO: 18.13 10*3/MM3 (ref 1.7–7)
NEUTROPHILS NFR BLD AUTO: 88.2 % (ref 42.7–76)
NITRITE UR QL STRIP: POSITIVE
NRBC BLD AUTO-RTO: 0 /100 WBC (ref 0–0.2)
PH UR STRIP.AUTO: <=5 [PH] (ref 5–8)
PLATELET # BLD AUTO: 183 10*3/MM3 (ref 140–450)
PMV BLD AUTO: 11.8 FL (ref 6–12)
POTASSIUM SERPL-SCNC: 4.2 MMOL/L (ref 3.5–5.2)
PROCALCITONIN SERPL-MCNC: 1.3 NG/ML (ref 0–0.25)
PROT SERPL-MCNC: 6.5 G/DL (ref 6–8.5)
PROT UR QL STRIP: ABNORMAL
RBC # BLD AUTO: 4.62 10*6/MM3 (ref 4.14–5.8)
RBC # UR STRIP: ABNORMAL /HPF
REF LAB TEST METHOD: ABNORMAL
S PNEUM AG SPEC QL LA: NEGATIVE
SARS-COV-2 RNA RESP QL NAA+PROBE: NOT DETECTED
SODIUM SERPL-SCNC: 130 MMOL/L (ref 136–145)
SP GR UR STRIP: >1.03 (ref 1–1.03)
SQUAMOUS #/AREA URNS HPF: ABNORMAL /HPF
UROBILINOGEN UR QL STRIP: ABNORMAL
WBC # UR STRIP: ABNORMAL /HPF
WBC NRBC COR # BLD: 20.55 10*3/MM3 (ref 3.4–10.8)
WHOLE BLOOD HOLD COAG: NORMAL

## 2023-05-12 PROCEDURE — 80053 COMPREHEN METABOLIC PANEL: CPT | Performed by: EMERGENCY MEDICINE

## 2023-05-12 PROCEDURE — 93010 ELECTROCARDIOGRAM REPORT: CPT | Performed by: INTERNAL MEDICINE

## 2023-05-12 PROCEDURE — 99223 1ST HOSP IP/OBS HIGH 75: CPT | Performed by: INTERNAL MEDICINE

## 2023-05-12 PROCEDURE — 84145 PROCALCITONIN (PCT): CPT | Performed by: INTERNAL MEDICINE

## 2023-05-12 PROCEDURE — 87150 DNA/RNA AMPLIFIED PROBE: CPT | Performed by: EMERGENCY MEDICINE

## 2023-05-12 PROCEDURE — 25010000002 PIPERACILLIN SOD-TAZOBACTAM PER 1 G: Performed by: INTERNAL MEDICINE

## 2023-05-12 PROCEDURE — 93005 ELECTROCARDIOGRAM TRACING: CPT | Performed by: EMERGENCY MEDICINE

## 2023-05-12 PROCEDURE — 36415 COLL VENOUS BLD VENIPUNCTURE: CPT | Performed by: EMERGENCY MEDICINE

## 2023-05-12 PROCEDURE — 85652 RBC SED RATE AUTOMATED: CPT | Performed by: EMERGENCY MEDICINE

## 2023-05-12 PROCEDURE — 93005 ELECTROCARDIOGRAM TRACING: CPT

## 2023-05-12 PROCEDURE — 25010000002 ENOXAPARIN PER 10 MG: Performed by: INTERNAL MEDICINE

## 2023-05-12 PROCEDURE — 87086 URINE CULTURE/COLONY COUNT: CPT | Performed by: INTERNAL MEDICINE

## 2023-05-12 PROCEDURE — 87804 INFLUENZA ASSAY W/OPTIC: CPT | Performed by: EMERGENCY MEDICINE

## 2023-05-12 PROCEDURE — 25010000002 VANCOMYCIN 5 G RECONSTITUTED SOLUTION: Performed by: EMERGENCY MEDICINE

## 2023-05-12 PROCEDURE — 99284 EMERGENCY DEPT VISIT MOD MDM: CPT | Performed by: PODIATRIST

## 2023-05-12 PROCEDURE — 25010000002 MORPHINE PER 10 MG: Performed by: FAMILY MEDICINE

## 2023-05-12 PROCEDURE — 87635 SARS-COV-2 COVID-19 AMP PRB: CPT | Performed by: EMERGENCY MEDICINE

## 2023-05-12 PROCEDURE — 73502 X-RAY EXAM HIP UNI 2-3 VIEWS: CPT

## 2023-05-12 PROCEDURE — 82948 REAGENT STRIP/BLOOD GLUCOSE: CPT

## 2023-05-12 PROCEDURE — 87186 SC STD MICRODIL/AGAR DIL: CPT | Performed by: EMERGENCY MEDICINE

## 2023-05-12 PROCEDURE — 82009 KETONE BODYS QUAL: CPT | Performed by: EMERGENCY MEDICINE

## 2023-05-12 PROCEDURE — 87449 NOS EACH ORGANISM AG IA: CPT | Performed by: INTERNAL MEDICINE

## 2023-05-12 PROCEDURE — 25010000002 MORPHINE PER 10 MG: Performed by: EMERGENCY MEDICINE

## 2023-05-12 PROCEDURE — 87040 BLOOD CULTURE FOR BACTERIA: CPT | Performed by: EMERGENCY MEDICINE

## 2023-05-12 PROCEDURE — 25010000002 PIPERACILLIN SOD-TAZOBACTAM PER 1 G: Performed by: EMERGENCY MEDICINE

## 2023-05-12 PROCEDURE — 81001 URINALYSIS AUTO W/SCOPE: CPT | Performed by: EMERGENCY MEDICINE

## 2023-05-12 PROCEDURE — 73030 X-RAY EXAM OF SHOULDER: CPT

## 2023-05-12 PROCEDURE — 25010000002 LINEZOLID 600 MG/300ML SOLUTION: Performed by: INTERNAL MEDICINE

## 2023-05-12 PROCEDURE — 73620 X-RAY EXAM OF FOOT: CPT

## 2023-05-12 PROCEDURE — 83605 ASSAY OF LACTIC ACID: CPT | Performed by: EMERGENCY MEDICINE

## 2023-05-12 PROCEDURE — 85025 COMPLETE CBC W/AUTO DIFF WBC: CPT | Performed by: EMERGENCY MEDICINE

## 2023-05-12 PROCEDURE — 86140 C-REACTIVE PROTEIN: CPT | Performed by: EMERGENCY MEDICINE

## 2023-05-12 PROCEDURE — 87899 AGENT NOS ASSAY W/OPTIC: CPT | Performed by: INTERNAL MEDICINE

## 2023-05-12 PROCEDURE — 87147 CULTURE TYPE IMMUNOLOGIC: CPT | Performed by: EMERGENCY MEDICINE

## 2023-05-12 PROCEDURE — 99285 EMERGENCY DEPT VISIT HI MDM: CPT

## 2023-05-12 PROCEDURE — 71045 X-RAY EXAM CHEST 1 VIEW: CPT

## 2023-05-12 RX ORDER — DEXTROSE MONOHYDRATE 25 G/50ML
25 INJECTION, SOLUTION INTRAVENOUS
Status: DISCONTINUED | OUTPATIENT
Start: 2023-05-12 | End: 2023-06-15 | Stop reason: HOSPADM

## 2023-05-12 RX ORDER — MORPHINE SULFATE 2 MG/ML
2 INJECTION, SOLUTION INTRAMUSCULAR; INTRAVENOUS ONCE AS NEEDED
Status: COMPLETED | OUTPATIENT
Start: 2023-05-12 | End: 2023-05-12

## 2023-05-12 RX ORDER — MORPHINE SULFATE 2 MG/ML
1 INJECTION, SOLUTION INTRAMUSCULAR; INTRAVENOUS ONCE AS NEEDED
Status: COMPLETED | OUTPATIENT
Start: 2023-05-12 | End: 2023-05-12

## 2023-05-12 RX ORDER — FLUTICASONE PROPIONATE 50 MCG
2 SPRAY, SUSPENSION (ML) NASAL DAILY
Status: ON HOLD | COMMUNITY

## 2023-05-12 RX ORDER — ACETAMINOPHEN 500 MG
1000 TABLET ORAL EVERY 8 HOURS
Status: DISCONTINUED | OUTPATIENT
Start: 2023-05-12 | End: 2023-06-12

## 2023-05-12 RX ORDER — ACETAMINOPHEN 325 MG/1
650 TABLET ORAL EVERY 4 HOURS PRN
Status: DISCONTINUED | OUTPATIENT
Start: 2023-05-12 | End: 2023-05-12

## 2023-05-12 RX ORDER — GABAPENTIN 300 MG/1
300 CAPSULE ORAL 3 TIMES DAILY
COMMUNITY
Start: 2023-04-28 | End: 2023-06-15 | Stop reason: HOSPADM

## 2023-05-12 RX ORDER — DILTIAZEM HYDROCHLORIDE 5 MG/ML
10 INJECTION INTRAVENOUS ONCE
Status: COMPLETED | OUTPATIENT
Start: 2023-05-12 | End: 2023-05-12

## 2023-05-12 RX ORDER — ACETAMINOPHEN 325 MG/1
650 TABLET ORAL ONCE
Status: COMPLETED | OUTPATIENT
Start: 2023-05-12 | End: 2023-05-12

## 2023-05-12 RX ORDER — SODIUM CHLORIDE 9 MG/ML
40 INJECTION, SOLUTION INTRAVENOUS AS NEEDED
Status: DISCONTINUED | OUTPATIENT
Start: 2023-05-12 | End: 2023-06-15 | Stop reason: HOSPADM

## 2023-05-12 RX ORDER — DILTIAZEM HCL IN NACL,ISO-OSM 125 MG/125
5-15 PLASTIC BAG, INJECTION (ML) INTRAVENOUS
Status: DISCONTINUED | OUTPATIENT
Start: 2023-05-12 | End: 2023-05-14

## 2023-05-12 RX ORDER — ENOXAPARIN SODIUM 100 MG/ML
40 INJECTION SUBCUTANEOUS DAILY
Status: DISCONTINUED | OUTPATIENT
Start: 2023-05-12 | End: 2023-05-13

## 2023-05-12 RX ORDER — DOXYCYCLINE HYCLATE 100 MG/1
100 CAPSULE ORAL 2 TIMES DAILY
COMMUNITY
End: 2023-06-15 | Stop reason: HOSPADM

## 2023-05-12 RX ORDER — SODIUM CHLORIDE 0.9 % (FLUSH) 0.9 %
10 SYRINGE (ML) INJECTION EVERY 12 HOURS SCHEDULED
Status: DISCONTINUED | OUTPATIENT
Start: 2023-05-12 | End: 2023-06-15 | Stop reason: HOSPADM

## 2023-05-12 RX ORDER — NICOTINE POLACRILEX 4 MG
15 LOZENGE BUCCAL
Status: DISCONTINUED | OUTPATIENT
Start: 2023-05-12 | End: 2023-06-15 | Stop reason: HOSPADM

## 2023-05-12 RX ORDER — LINEZOLID 2 MG/ML
600 INJECTION, SOLUTION INTRAVENOUS EVERY 12 HOURS
Status: DISCONTINUED | OUTPATIENT
Start: 2023-05-12 | End: 2023-05-14

## 2023-05-12 RX ORDER — DILTIAZEM HYDROCHLORIDE 5 MG/ML
20 INJECTION INTRAVENOUS ONCE
Status: COMPLETED | OUTPATIENT
Start: 2023-05-12 | End: 2023-05-12

## 2023-05-12 RX ORDER — ONDANSETRON 2 MG/ML
4 INJECTION INTRAMUSCULAR; INTRAVENOUS EVERY 6 HOURS PRN
Status: DISCONTINUED | OUTPATIENT
Start: 2023-05-12 | End: 2023-06-15 | Stop reason: HOSPADM

## 2023-05-12 RX ORDER — CIPROFLOXACIN 500 MG/1
500 TABLET, FILM COATED ORAL 2 TIMES DAILY
COMMUNITY
End: 2023-06-15 | Stop reason: HOSPADM

## 2023-05-12 RX ORDER — SODIUM CHLORIDE 0.9 % (FLUSH) 0.9 %
10 SYRINGE (ML) INJECTION AS NEEDED
Status: DISCONTINUED | OUTPATIENT
Start: 2023-05-12 | End: 2023-06-15 | Stop reason: HOSPADM

## 2023-05-12 RX ORDER — OXYCODONE HYDROCHLORIDE 5 MG/1
5 TABLET ORAL EVERY 4 HOURS PRN
Status: DISCONTINUED | OUTPATIENT
Start: 2023-05-12 | End: 2023-05-13

## 2023-05-12 RX ORDER — INSULIN LISPRO 100 [IU]/ML
2-7 INJECTION, SOLUTION INTRAVENOUS; SUBCUTANEOUS
Status: DISCONTINUED | OUTPATIENT
Start: 2023-05-12 | End: 2023-05-14

## 2023-05-12 RX ORDER — HYDROCODONE BITARTRATE AND ACETAMINOPHEN 5; 325 MG/1; MG/1
1 TABLET ORAL EVERY 6 HOURS PRN
Status: DISPENSED | OUTPATIENT
Start: 2023-05-12 | End: 2023-05-24

## 2023-05-12 RX ADMIN — LINEZOLID 600 MG: 600 INJECTION, SOLUTION INTRAVENOUS at 21:02

## 2023-05-12 RX ADMIN — DILTIAZEM HYDROCHLORIDE 10 MG: 5 INJECTION INTRAVENOUS at 16:54

## 2023-05-12 RX ADMIN — SODIUM CHLORIDE 1000 ML: 9 INJECTION, SOLUTION INTRAVENOUS at 15:23

## 2023-05-12 RX ADMIN — PIPERACILLIN SODIUM AND TAZOBACTAM SODIUM 3.38 G: 3; .375 INJECTION, POWDER, LYOPHILIZED, FOR SOLUTION INTRAVENOUS at 15:22

## 2023-05-12 RX ADMIN — ACETAMINOPHEN 1000 MG: 325 TABLET ORAL at 22:12

## 2023-05-12 RX ADMIN — VANCOMYCIN HYDROCHLORIDE 3000 MG: 5 INJECTION, POWDER, LYOPHILIZED, FOR SOLUTION INTRAVENOUS at 15:47

## 2023-05-12 RX ADMIN — SODIUM CHLORIDE, POTASSIUM CHLORIDE, SODIUM LACTATE AND CALCIUM CHLORIDE 1000 ML: 600; 310; 30; 20 INJECTION, SOLUTION INTRAVENOUS at 17:26

## 2023-05-12 RX ADMIN — OXYCODONE HYDROCHLORIDE 5 MG: 5 TABLET ORAL at 19:11

## 2023-05-12 RX ADMIN — ACETAMINOPHEN 650 MG: 325 TABLET ORAL at 16:05

## 2023-05-12 RX ADMIN — PIPERACILLIN AND TAZOBACTAM 4.5 G: 4; .5 INJECTION, POWDER, FOR SOLUTION INTRAVENOUS at 20:56

## 2023-05-12 RX ADMIN — MORPHINE SULFATE 1 MG: 2 INJECTION, SOLUTION INTRAMUSCULAR; INTRAVENOUS at 20:07

## 2023-05-12 RX ADMIN — MORPHINE SULFATE 2 MG: 2 INJECTION, SOLUTION INTRAMUSCULAR; INTRAVENOUS at 22:24

## 2023-05-12 RX ADMIN — ENOXAPARIN SODIUM 40 MG: 100 INJECTION SUBCUTANEOUS at 20:56

## 2023-05-12 RX ADMIN — DILTIAZEM HYDROCHLORIDE 10 MG: 5 INJECTION INTRAVENOUS at 17:17

## 2023-05-12 RX ADMIN — DILTIAZEM HYDROCHLORIDE 20 MG: 5 INJECTION INTRAVENOUS at 15:54

## 2023-05-12 RX ADMIN — DILTIAZEM HYDROCHLORIDE 5 MG/HR: 5 INJECTION INTRAVENOUS at 16:17

## 2023-05-12 RX ADMIN — MORPHINE SULFATE 4 MG: 4 INJECTION, SOLUTION INTRAMUSCULAR; INTRAVENOUS at 16:17

## 2023-05-12 RX ADMIN — Medication 10 ML: at 20:57

## 2023-05-12 RX ADMIN — DILTIAZEM HYDROCHLORIDE 15 MG/HR: 5 INJECTION INTRAVENOUS at 22:28

## 2023-05-12 RX ADMIN — HYDROCODONE BITARTRATE AND ACETAMINOPHEN 1 TABLET: 5; 325 TABLET ORAL at 14:40

## 2023-05-12 NOTE — H&P
[facility] HISTORY AND PHYSICAL  Date: 2023   Patient Name: Jose Shaikh  : 1960  MRN: [id number]  Primary Care Physician:  Delia Cervantes, VALENTÍN  Date of admission: 2023    Subjective   Subjective     Chief Complaint: Hip pain    HPI:    Jose Shaikh is a 62 y.o. male past medical history of osteomyelitis, type 2 diabetes, hypertension, A-fib, dyslipidemia, obesity with BMI of 48, who was recently hospital for osteomyelitis and he returns due to hip pain.      Patient was recently admitted to the hospital in early April for foot infection.  There was recommendation for amputation due to osteomyelitis in the foot and the patient refused.  He was unable to be placed at that time.  He is a sex offender making it very difficult for placement although [facility] was an option.  As result, he opted to go home on oral antibiotics to treat Alcaligenes facialis and Morganella morganii with doxycycline and ciprofloxacin.  The patient was seen in wound care in middle of April and at that time Dr. Daniels attempted to switch antibiotics to Levaquin per pharmacy recommendations based off culture data but was unable to get hold the patient so he was never switched.  The patient states the only reason he came to the emergency department was because his left hip was hurting.  Unaware of fevers.  Chills.  Cough.  Shortness of breath.  The patient came to the ER for hip pain.      In the emergency department the patient's vital signs are as follows temperature is 103.4, pulse is 139, blood pressure is 92/65, 95% on room air.  CBC shows a white blood cell count of 20.55 with neutrophils of 88.2.  CRP is 20.53, which is up from 7.4 in April and lactate is 2.6.  Sed rate was 42 in April and is currently 75.  Bicarb is 19.9 and sodium is 130.  Urinalysis shows positive nitrite and small leuk esterase but no bacteria.  Patient also seems to have a pneumonia in his right upper lobe.  X-ray of  the right foot shows evidence of osteomyelitis involving the distal aspect of the cuboid and gas in the soft tissue along the plantar aspect of the foot just proximal amputation site and periostitis involving the cuneiform.  Patient got 1 L of normal saline.  Patient received Zosyn and vancomycin for antibiotics.  Podiatry was consulted and did not feel like the foot was the source.  Patient will be admitted to the hospital for severe sepsis with unclear source at this time.  The source could be pneumonia, urinary tract infection or most likely the foot.  Patient will also be managed with A-fib with RVR    All systems reviewed abnormalities noted above    Personal History     Past Medical History:  Osteomyelitis  Type 2 diabetes  Hypertension  A-fib   Dyslipidemia  Obesity with BMI 48    Past Surgical History:  Cyst removal  Incision and drainage  Incision and drainage of the leg  Other surgical history  Toe surgery  Transmetatarsal amputation    Family History:   Cancer  Heart disease    Social History:   Former cigarette smoker.  Rare alcohol use    Home Medications:  Dulaglutide, Insulin Lispro, acetaminophen, apixaban, ciprofloxacin, doxycycline, fluticasone, gabapentin, insulin detemir, lisinopril, meloxicam, metFORMIN, metoprolol succinate XL, and simvastatin    Allergies:  Allergies   Allergen Reactions   • Adhesive Tape Rash       Objective   Objective     Vitals:   Temp:  [99.2 °F (37.3 °C)-103.4 °F (39.7 °C)] 102.4 °F (39.1 °C)  Heart Rate:  [] 148  Resp:  [18] 18  BP: ()/(59-99) 117/75    Physical Exam    Constitutional: Awake, alert, no acute distress   Eyes: Pupils equal, sclerae anicteric, no conjunctival injection   HENT: NCAT, mucous membranes moist   Neck: Supple, no thyromegaly, no lymphadenopathy, trachea midline   Respiratory coarse breath sounds.  Body habitus makes it difficult to get a good lung exam   Cardiovascular: RRR, no murmurs, rubs, or gallops, palpable pedal pulses  bilaterally   Gastrointestinal: Positive bowel sounds, soft, nontender, nondistended   Musculoskeletal: No bilateral ankle edema, no clubbing or cyanosis to extremities   Psychiatric: Appropriate affect, cooperative   Neurologic: Oriented x 3, strength symmetric in all extremities, Cranial Nerves grossly intact to confrontation, speech clear   Skin: No rashes.  Right foot shows amputation transmetatarsal.  There is a dehisced wound there is area of warmth and redness.    Result Review    Result Review:  I have personally reviewed the results from the time of this admission to 5/12/2023 17:03 EDT and agree with these findings: White blood cell count is 20,000  White blood cell count is 20,000  CRP is 20  ESR 75  Lactate is 2.6  Procalcitonin is 1.3  Bicarb is 19.9 with mild anion gap of 12    X-ray of right foot shows evidence of osteomyelitis involving the distal aspect of the cuboid gas and soft tissue along the plantar aspect of the foot just proximal amputation site  Periostitis of the cuneiform.    Chest x-ray shows right upper lobe pneumonia      Assessment & Plan   Assessment / Plan     Assessment/Plan:   Severe sepsis present on admission (fever, tachycardia, leukocytosis)  Osteomyelitis of the right foot (increased CRP, increased sed rate from April) with wound culture of Alcaligenes and Morganella from 4/3  Right upper lobe pneumonia  A-fib with RVR  Abnormal urinalysis with nitrite positive and leuk esterase small  Type 2 diabetes with hyperglycemia  Hyponatremia  Lactic acidosis    Jose is a 62-year-old gentleman with past medical history of obesity, type 2 diabetes, and osteomyelitis of the right foot status post transmetatarsal amputation.  He was recently admitted to the hospital early April with recommendation to do a further amputation but he refused as he could not be placed into a rehab facility.  He chose to be discharged home on oral antibiotics with Doxy and Cipro.  The wound culture grew  Alcaligenes and Morganella which is susceptible to Levaquin and resistant to Doxy.  The patient presents today with a fever to 103, tachycardia with A-fib with RVR, and a leukocytosis to 20,000.  CRP is up from 7->20 and ESR is up from 46->75.  X-ray shows findings consistent with osteomyelitis and then there is an area concerning for gas.  Patient also has a chest x-ray that shows right upper lobe pneumonia.  With a procalcitonin of 1.30.  Patient was seen by podiatry in the emergency department and did not feel that the foot wound was causing the patient's presentation.  However, I am concerned as the patient has no other signs symptoms of pneumonia at this time.  I will treat him as if the foot is the source with Zyvox for toxin mediating properties and Zosyn to cover gram-negative's and anaerobes.  Patient will be treated for sepsis due to unclear source at this time.        Plan:  -- Admit to hospitalist service  -- Consult podiatry  -- We will start with Zyvox and Zosyn due to the concern for toxin forming bacteria in the foot wound and lack of access to clindamycin  -- We will bolus a second liter of lactated Ringer's  -- Strep and Legionella urine antigen for possible pneumonia  -- Respiratory culture and blood culture  -- Procalcitonin now and again in the morning  -- Oxygen for saturations less than 90% although he does not require oxygen at this time  -- Repeat CRP in the morning  -- We will continue diltiazem drip for A-fib with RVR  -- Sliding scale      DVT prophylaxis:  Lovenox      CODE STATUS:     Full code    Admission Status:  I believe this patient meets admission status.    Electronically signed by Darrin Lamar MD, 05/12/23, 5:03 PM EDT.

## 2023-05-12 NOTE — LETTER
[facility]  [address]  [place] [address]  [phone]        May 25, 2023      Patient: Jose Shaikh  YOB: 1960  Date of Visit: 5/12/2023    Rehab referral-    Thank you,  Ella May, MSW  [phone]

## 2023-05-12 NOTE — ED PROVIDER NOTES
Time: 1:07 PM EDT  Date of encounter:  5/12/2023  Independent Historian/Clinical History and Information was obtained by:   Patient  Chief Complaint   Patient presents with   • Hip Pain   • Shoulder Pain       History is limited by: N/A    History of Present Illness:  Patient is a 62 y.o. year old male who presents to the emergency department for evaluation of right hip and right shoulder pain.  Patient states he fell out of his bed approximately 1 week ago and has had worsening pain since that time.  Patient states he sees Dr. Parkinson for the left hip.  Patient notes that the pain in the shoulder hip have been chronic but worsened since the fall.  The patient has chronic left hip pain.  The patient notes that he has home nursing.  The patient has had a left diabetic foot infection which they are doing dressing changes.  He is also had a right foot amputation that is getting wound care as well.  The patient states at home nursing told him he had a fever on Monday.  He believes it was 102.  Patient also notes that he is a diabetic and his blood sugars been high.  Patient also notes a cough.  The patient is unable to tell me whether the wounds are any worse than normal.  He thinks his left foot is actually healing.  He denies any other rash.  He does have urinary frequency but no dysuria      Patient Care Team  Primary Care Provider: Delia Cervantes APRN    Past Medical History:     Allergies   Allergen Reactions   • Adhesive Tape Rash     Past Medical History:   Diagnosis Date   • Absence of toe of right foot    • Acute osteomyelitis of left calcaneus  8/18/2021   • Anxiety and depression    • Arthritis    • Claustrophobia    • Corns and callus    • Diabetic ulcer of left heel associated with type 2 DM 8/18/2021   • Diabetic ulcer of left heel associated with type 2 DM 7/6/2021   • Diabetic ulcer of right midfoot associated with type 2 DM 8/18/2021   • Difficulty walking    • Essential hypertension 8/31/2021   •  Jaron    • Hyperlipidemia LDL goal <100 8/31/2021   • Ingrown toenail    • Obesity    • Paroxysmal atrial fibrillation 8/31/2021   • Polyneuropathy    • Pressure ulcer, stage 1    • Tinea unguium    • Type 2 diabetes mellitus with polyneuropathy      Past Surgical History:   Procedure Laterality Date   • CYST REMOVAL      center of back; benign   • INCISION AND DRAINAGE ABSCESS      back   • INCISION AND DRAINAGE LEG Right 12/10/2021    Procedure: INCISION AND DRAINAGE LOWER EXTREMITY;  Surgeon: Ash Leyva DPM;  Location: [facility] MAIN OR;  Service: Podiatry;  Laterality: Right;   • OTHER SURGICAL HISTORY      Surgical clips left foot   • TOE SURGERY Right     Removal of 5th toe   • TRANS METATARSAL AMPUTATION Right 12/2/2021    Procedure: AMPUTATION TRANS METATARSAL;  Surgeon: Ash Leyva DPM;  Location: [facility] MAIN OR;  Service: Podiatry;  Laterality: Right;   • WRIST SURGERY Left     repair of injury     Family History   Problem Relation Age of Onset   • Heart disease Mother    • Heart disease Father    • Cancer Father         Unspecified   • Heart disease Brother        Home Medications:  Prior to Admission medications    Medication Sig Start Date End Date Taking? Authorizing Provider   acetaminophen (TYLENOL) 650 MG 8 hr tablet Take 1 tablet by mouth Every 8 (Eight) Hours As Needed for Mild Pain.    ProviderLaura MD   apixaban (ELIQUIS) 5 MG tablet tablet Take 1 tablet by mouth Every 12 (Twelve) Hours. 1/31/23   Delia Cervantes APRN   Dulaglutide 3 MG/0.5ML solution pen-injector Inject 0.5 mg under the skin into the appropriate area as directed 1 (One) Time Per Week. 3/6/23   ProviderLaura MD   glucose blood test strip Test blood sugar 3 times a day 3/15/23   Kami Garcia APRN   insulin detemir (Levemir FlexTouch) 100 UNIT/ML injection Inject 60 Units under the skin into the appropriate area as directed Daily. 1/27/23   Delia Cervantes APRN   Insulin Lispro  (HumaLOG KwikPen) 200 UNIT/ML solution pen-injector Inject 30 Units under the skin into the appropriate area as directed 3 (Three) Times a Day Before Meals. 22   Kami Garcia APRN   Insulin Pen Needle (Pen Needles) 32G X 5 MM misc 1 each 4 (Four) Times a Day. 22   Kami Garcia APRN   lisinopril (PRINIVIL,ZESTRIL) 20 MG tablet Take 1 tablet by mouth Daily.  Patient not taking: Reported on 2023   Delia Cervantes APRN   meloxicam (MOBIC) 7.5 MG tablet Take 2 tablets by mouth Daily. 3/17/23   Max Parkinson MD   metFORMIN (GLUCOPHAGE) 1000 MG tablet Take 1 tablet by mouth 2 (Two) Times a Day With Meals.  Patient not taking: Reported on 2023   Delia Cervantes APRN   metoprolol succinate XL (TOPROL-XL) 50 MG 24 hr tablet Take 1 tablet by mouth Daily.  Patient not taking: Reported on 2023   Delia Cervantes APRN   Pro Comfort Lancets 31G misc  22   Provider, MD Laura   simvastatin (ZOCOR) 20 MG tablet Take 1 tablet by mouth Every Night. 23   Delia Cervantes APRN        Social History:   Social History     Tobacco Use   • Smoking status: Former     Packs/day: 0.00     Years: 1.00     Pack years: 0.00     Types: Cigarettes     Quit date: 1991     Years since quittin.7   • Smokeless tobacco: Never   • Tobacco comments:     quit at age 30   Vaping Use   • Vaping Use: Never used   Substance Use Topics   • Alcohol use: Yes     Comment: Rare   • Drug use: Yes     Types: Marijuana     Comment: Daily         Review of Systems:  Review of Systems   Constitutional: Positive for fatigue and fever. Negative for chills and diaphoresis.   HENT: Negative for congestion, postnasal drip, rhinorrhea and sore throat.    Eyes: Negative for photophobia.   Respiratory: Positive for cough. Negative for chest tightness and shortness of breath.    Cardiovascular: Negative for chest pain, palpitations and leg swelling.   Gastrointestinal: Negative for  "abdominal pain, diarrhea, nausea and vomiting.   Genitourinary: Positive for frequency. Negative for difficulty urinating, dysuria, flank pain, hematuria and urgency.   Musculoskeletal: Positive for arthralgias. Negative for neck pain and neck stiffness.   Skin: Positive for wound. Negative for pallor and rash.   Neurological: Negative for dizziness, syncope, weakness, numbness and headaches.   Hematological: Negative for adenopathy. Does not bruise/bleed easily.   Psychiatric/Behavioral: Negative.         Physical Exam:  /71 (BP Location: Left arm, Patient Position: Lying)   Pulse (!) 124   Temp 98.8 °F (37.1 °C) (Oral)   Resp 16   Ht 188 cm (74\")   Wt (!) 170 kg (375 lb)   SpO2 97%   BMI 48.15 kg/m²     Physical Exam  Vitals and nursing note reviewed.   Constitutional:       General: He is not in acute distress.     Appearance: Normal appearance. He is not ill-appearing, toxic-appearing or diaphoretic.   HENT:      Head: Normocephalic and atraumatic.      Mouth/Throat:      Mouth: Mucous membranes are moist.   Eyes:      Extraocular Movements: Extraocular movements intact.      Conjunctiva/sclera: Conjunctivae normal.      Pupils: Pupils are equal, round, and reactive to light.   Cardiovascular:      Rate and Rhythm: Tachycardia present. Rhythm irregular.      Pulses: Normal pulses.           Carotid pulses are 2+ on the right side and 2+ on the left side.       Radial pulses are 2+ on the right side and 2+ on the left side.        Femoral pulses are 2+ on the right side and 2+ on the left side.       Popliteal pulses are 2+ on the right side and 2+ on the left side.        Dorsalis pedis pulses are 2+ on the right side and 2+ on the left side.        Posterior tibial pulses are 2+ on the right side and 2+ on the left side.      Heart sounds: Normal heart sounds. No murmur heard.  Pulmonary:      Effort: Pulmonary effort is normal. No accessory muscle usage, respiratory distress or retractions.      " Breath sounds: Examination of the right-upper field reveals decreased breath sounds. Examination of the left-upper field reveals decreased breath sounds. Examination of the right-middle field reveals decreased breath sounds. Examination of the left-middle field reveals decreased breath sounds. Examination of the right-lower field reveals decreased breath sounds. Examination of the left-lower field reveals decreased breath sounds. Decreased breath sounds present. No wheezing, rhonchi or rales.   Abdominal:      General: Abdomen is flat. There is no distension.      Palpations: Abdomen is soft. There is no mass or pulsatile mass.      Tenderness: There is no abdominal tenderness. There is no right CVA tenderness, left CVA tenderness, guarding or rebound.      Comments: No rigidity   Musculoskeletal:         General: No swelling.      Right shoulder: No swelling, deformity, effusion, tenderness or bony tenderness. Decreased range of motion.      Cervical back: Neck supple. No tenderness.      Right hip: No deformity, tenderness or bony tenderness. Decreased range of motion.      Left hip: Tenderness and bony tenderness present. No deformity. Decreased range of motion.      Right knee: Deformity present. No swelling or bony tenderness. Decreased range of motion. No tenderness.      Right lower leg: No edema.      Left lower leg: No edema.      Comments: The patient only has pain at the right shoulder with movement    Patient has chronic deformity of the right knee from osteoarthritis    The patient initially had wound dressings to both feet.  I will have them remove   Skin:     General: Skin is warm and dry.      Capillary Refill: Capillary refill takes less than 2 seconds.      Coloration: Skin is not cyanotic, jaundiced or pale.      Findings: No erythema.   Neurological:      General: No focal deficit present.      Mental Status: He is alert and oriented to person, place, and time. Mental status is at baseline.       Cranial Nerves: Cranial nerves 2-12 are intact. No cranial nerve deficit.      Sensory: Sensation is intact. No sensory deficit.      Motor: Motor function is intact. No weakness or pronator drift.      Coordination: Coordination is intact. Coordination normal.   Psychiatric:         Attention and Perception: Attention and perception normal.         Mood and Affect: Mood normal.         Behavior: Behavior normal.                  Procedures:  Procedures      Medical Decision Making:      Comorbidities that affect care:    Atrial fibrillation, diabetic foot infection, hypertension, diabetes, hyperlipidemia left calcaneus osteomyelitis    External Notes reviewed:    None      The following orders were placed and all results were independently analyzed by me:  Orders Placed This Encounter   Procedures   • COVID-19,APTIMA PANTHER(LUDIN),[facility]/ [facility], NP/OP SWAB IN UTM/VTM/SALINE TRANSPORT MEDIA,24 HR TAT - Swab, Nasal Cavity   • Influenza Antigen, Rapid - Swab, Nasopharynx   • Blood Culture - Blood,   • Blood Culture - Blood,   • S. Pneumo Ag Urine or CSF - Urine, Urine, Clean Catch   • Legionella Antigen, Urine - Urine, Urine, Clean Catch   • Respiratory Culture - Sputum, Cough   • Urine Culture - Urine, Urine, Clean Catch   • XR Shoulder 2+ View Right   • XR Hip With or Without Pelvis 2 - 3 View Left   • XR Chest 1 View   • XR Foot 2 View Right   • Comprehensive Metabolic Panel   • Acetone   • CBC Auto Differential   • Urinalysis With Culture If Indicated - Urine, Clean Catch   • C-reactive Protein   • Sedimentation Rate   • Lactic Acid, Plasma   • Urinalysis, Microscopic Only - Urine, Clean Catch   • STAT Lactic Acid, Reflex   • [place] Draw   • Procalcitonin   • CBC Auto Differential   • Comprehensive Metabolic Panel   • Magnesium   • Procalcitonin   • C-reactive Protein   • NPO Diet NPO Type: Strict NPO   • Diet: Cardiac Diets, Diabetic Diets; Healthy Heart (2-3 Na+); Consistent Carbohydrate; Texture: Regular  Texture (IDDSI 7); Fluid Consistency: Thin (IDDSI 0)   • Please perform rectal temp and notify physician  Nursing Communication   • Straight cath   • Wound Care Follow-up   • Vital Signs   • Intake & Output   • Weigh Patient   • Oral Care   • Telemetry - Maintain IV Access   • Continuous Cardiac Monitoring   • Daily Weights   • Strict Intake & Output   • Discontinue Insulin Infusion at Specified Time After Basal Dose Administered   • Discontinue Glucommander IV Insulin Order Set After Transition Complete   • Discontinue Glucommander After Transition Complete   • Continuous pulse oximetry   • Code Status and Medical Interventions:   • General MD Inpatient Consult   • Inpatient Hospitalist Consult   • Inpatient Podiatry Consult   • Inpatient Consult to Advance Care Planning   • Inpatient Case Management  Consult   • PT Consult: Eval & Treat Functional Mobility Below Baseline   • Oxygen Therapy- Nasal Cannula; Titrate for SPO2: 90% - 95%   • Oxygen Therapy- Nasal Cannula; Titrate for SPO2: 90% - 95%   • POC Glucose TID AC   • POC Glucose Once   • ECG 12 Lead Tachycardia   • ECG 12 Lead Tachycardia   • Wound Ostomy Eval & Treat   • Insert peripheral IV   • Insert Peripheral IV   • Inpatient Admission   • CBC & Differential   • Light Blue Top       Medications Given in the Emergency Department:  Medications   HYDROcodone-acetaminophen (NORCO) 5-325 MG per tablet 1 tablet (1 tablet Oral Given 5/12/23 1440)   sodium chloride 0.9 % flush 10 mL (has no administration in time range)   dilTIAZem (CARDIZEM) 125 mg in 125 mL sodium chloride  infusion (15 mg/hr Intravenous New Bag 5/12/23 2228)   sodium chloride 0.9 % flush 10 mL (has no administration in time range)   sodium chloride 0.9 % flush 10 mL (10 mL Intravenous Given 5/12/23 2057)   sodium chloride 0.9 % infusion 40 mL (has no administration in time range)   Enoxaparin Sodium (LOVENOX) syringe 40 mg (40 mg Subcutaneous Given 5/12/23 2056)   oxyCODONE  (ROXICODONE) immediate release tablet 5 mg (5 mg Oral Given 5/12/23 1911)   ondansetron (ZOFRAN) injection 4 mg (has no administration in time range)   Linezolid (ZYVOX) 600 mg 300 mL (600 mg Intravenous New Bag 5/12/23 2102)   Pharmacy to Dose Zosyn (has no administration in time range)   dextrose (GLUTOSE) oral gel 15 g (has no administration in time range)   dextrose (D50W) (25 g/50 mL) IV injection 25 g (has no administration in time range)   glucagon (GLUCAGEN) injection 1 mg (has no administration in time range)   Insulin Lispro (humaLOG) injection 2-7 Units (has no administration in time range)   piperacillin-tazobactam (ZOSYN) 4.5 g/100 mL 0.9% NS IVPB (mbp) (has no administration in time range)   acetaminophen (TYLENOL) tablet 1,000 mg (1,000 mg Oral Given 5/12/23 2212)   sodium chloride 0.9 % bolus 1,000 mL (0 mL Intravenous Stopped 5/12/23 1617)   piperacillin-tazobactam (ZOSYN) 3.375 g/100 mL 0.9% NS IVPB (mbp) (0 g Intravenous Stopped 5/12/23 1607)   vancomycin 3000 mg/500 mL 0.9% NS IVPB ([facility]) (3,000 mg Intravenous New Bag 5/12/23 1547)   dilTIAZem (CARDIZEM) injection 20 mg (20 mg Intravenous Given 5/12/23 1554)   acetaminophen (TYLENOL) tablet 650 mg (650 mg Oral Given 5/12/23 1605)   morphine injection 4 mg (4 mg Intravenous Given 5/12/23 1617)   dilTIAZem (CARDIZEM) injection 10 mg (10 mg Intravenous Given 5/12/23 1654)   lactated ringers bolus 1,000 mL (1,000 mL Intravenous New Bag 5/12/23 1726)   dilTIAZem (CARDIZEM) injection 10 mg (10 mg Intravenous Given 5/12/23 1717)   dilTIAZem (CARDIZEM) bolus from bag 1 mg/mL 10 mg (10 mg Intravenous Bolus from Bag 5/12/23 1759)   piperacillin-tazobactam (ZOSYN) 4.5 g/100 mL 0.9% NS IVPB (mbp) (4.5 g Intravenous New Bag 5/12/23 2056)   morphine injection 1 mg (1 mg Intravenous Given 5/12/23 2007)   morphine injection 2 mg (2 mg Intravenous Given 5/12/23 2224)        ED Course:    The patient was initially evaluated in the triage area where orders were  placed. The patient was later dispositioned by Ha Schwab DO.      The patient was advised to stay for completion of workup which includes but is not limited to communication of labs and radiological results, reassessment and plan. The patient was advised that leaving prior to disposition by a provider could result in critical findings that are not communicated to the patient.     ED Course as of 05/12/23 2332   Fri May 12, 2023   1308 PROVIDER IN TRIAGE  Patient was evaluated by me in triage, Travis Rao PA-C.  Orders were placed and patient is currently awaiting final results and disposition.  [MD]      ED Course User Index  [MD] Travis Rao PA-C       Labs:    Lab Results (last 24 hours)     Procedure Component Value Units Date/Time    CBC & Differential [[id number]]  (Abnormal) Collected: 05/12/23 1447    Specimen: Blood Updated: 05/12/23 1459    Narrative:      The following orders were created for panel order CBC & Differential.  Procedure                               Abnormality         Status                     ---------                               -----------         ------                     CBC Auto Differential[[id number]]        Abnormal            Final result                 Please view results for these tests on the individual orders.    Comprehensive Metabolic Panel [[id number]]  (Abnormal) Collected: 05/12/23 1447    Specimen: Blood Updated: 05/12/23 1522     Glucose 214 mg/dL      BUN 15 mg/dL      Creatinine 0.76 mg/dL      Sodium 130 mmol/L      Potassium 4.2 mmol/L      Chloride 98 mmol/L      CO2 19.9 mmol/L      Calcium 8.2 mg/dL      Total Protein 6.5 g/dL      Albumin 2.6 g/dL      ALT (SGPT) 23 U/L      AST (SGOT) 31 U/L      Alkaline Phosphatase 162 U/L      Total Bilirubin 1.5 mg/dL      Globulin 3.9 gm/dL      A/G Ratio 0.7 g/dL      BUN/Creatinine Ratio 19.7     Anion Gap 12.1 mmol/L      eGFR 100.4 mL/min/1.73     Narrative:      GFR Normal >60  Chronic Kidney Disease  <60  Kidney Failure <15      Acetone [[id number]]  (Normal) Collected: 05/12/23 1447    Specimen: Blood Updated: 05/12/23 1514     Acetone Negative    CBC Auto Differential [[id number]]  (Abnormal) Collected: 05/12/23 1447    Specimen: Blood Updated: 05/12/23 1459     WBC 20.55 10*3/mm3      RBC 4.62 10*6/mm3      Hemoglobin 11.5 g/dL      Hematocrit 35.6 %      MCV 77.1 fL      MCH 24.9 pg      MCHC 32.3 g/dL      RDW 16.5 %      RDW-SD 45.0 fl      MPV 11.8 fL      Platelets 183 10*3/mm3      Neutrophil % 88.2 %      Lymphocyte % 3.7 %      Monocyte % 5.8 %      Eosinophil % 0.7 %      Basophil % 0.3 %      Immature Grans % 1.3 %      Neutrophils, Absolute 18.13 10*3/mm3      Lymphocytes, Absolute 0.76 10*3/mm3      Monocytes, Absolute 1.19 10*3/mm3      Eosinophils, Absolute 0.14 10*3/mm3      Basophils, Absolute 0.06 10*3/mm3      Immature Grans, Absolute 0.27 10*3/mm3      nRBC 0.0 /100 WBC     C-reactive Protein [[id number]]  (Abnormal) Collected: 05/12/23 1447    Specimen: Blood Updated: 05/12/23 1522     C-Reactive Protein 20.53 mg/dL     Sedimentation Rate [[id number]]  (Abnormal) Collected: 05/12/23 1447    Specimen: Blood Updated: 05/12/23 1518     Sed Rate 75 mm/hr     Blood Culture - Blood, Arm, Left [[id number]] Collected: 05/12/23 1447    Specimen: Blood from Arm, Left Updated: 05/12/23 1457    Blood Culture - Blood, Arm, Right [[id number]] Collected: 05/12/23 1447    Specimen: Blood from Arm, Right Updated: 05/12/23 1458    Lactic Acid, Plasma [[id number]]  (Abnormal) Collected: 05/12/23 1447    Specimen: Blood Updated: 05/12/23 1534     Lactate 2.6 mmol/L     Procalcitonin [[id number]]  (Abnormal) Collected: 05/12/23 1447    Specimen: Blood Updated: 05/12/23 1738     Procalcitonin 1.30 ng/mL     Narrative:      As a Marker for Sepsis (Non-Neonates):    1. <0.5 ng/mL represents a low risk of severe sepsis and/or septic shock.  2. >2 ng/mL represents a high risk of severe sepsis and/or septic shock.    As a  "Marker for Lower Respiratory Tract Infections that require antibiotic therapy:    PCT on Admission    Antibiotic Therapy       6-12 Hrs later    >0.5                Strongly Recommended  >0.25 - <0.5        Recommended  0.1 - 0.25          Discouraged              Remeasure/reassess PCT  <0.1                Strongly Discouraged     Remeasure/reassess PCT    As 28 day mortality risk marker: \"Change in Procalcitonin Result\" (>80% or <=80%) if Day 0 (or Day 1) and Day 4 values are available. Refer to http://[email or web address]-pct-[email or web address]    Change in PCT <=80%  A decrease of PCT levels below or equal to 80% defines a positive change in PCT test result representing a higher risk for 28-day all-cause mortality of patients diagnosed with severe sepsis for septic shock.    Change in PCT >80%  A decrease of PCT levels of more than 80% defines a negative change in PCT result representing a lower risk for 28-day all-cause mortality of patients diagnosed with severe sepsis or septic shock.    This test is Prognostic not Diagnostic, if elevated correlate with clinical findings before administering antibiotic treatment.        COVID-19,APTIMA PANTHER(LUDIN),[facility], NP/OP SWAB IN UTM/VTM/SALINE TRANSPORT MEDIA,24 HR TAT - Swab, Nasal Cavity [[id number]]  (Normal) Collected: 05/12/23 1449    Specimen: Swab from Nasal Cavity Updated: 05/12/23 1917     COVID19 Not Detected    Narrative:      Fact sheet for providers: https://www.fda.gov/media/454432/download     Fact sheet for patients: https://www.fda.gov/media/086291/download    Test performed by RT PCR.    Influenza Antigen, Rapid - Swab, Nasopharynx [[id number]]  (Normal) Collected: 05/12/23 1449    Specimen: Swab from Nasopharynx Updated: 05/12/23 1518     Influenza A Ag, EIA Negative     Influenza B Ag, EIA Negative    Urinalysis With Culture If Indicated - Urine, Clean Catch [[id number]]  (Abnormal) Collected: 05/12/23 1454    Specimen: Urine, Clean Catch Updated: 05/12/23 " 1533     Color, UA [place]     Appearance, UA Cloudy     pH, UA <=5.0     Specific Gravity, UA >1.030     Glucose, UA --     Comment: Result not available due to interfering substances.        Ketones, UA Trace     Bilirubin, UA Moderate (2+)     Blood, UA Negative     Protein, UA --     Comment: Result not available due to interfering substances.        Leuk Esterase, UA Small (1+)     Nitrite, UA Positive     Urobilinogen, UA 1.0 E.U./dL    Narrative:      In absence of clinical symptoms, the presence of pyuria, bacteria, and/or nitrites on the urinalysis result does not correlate with infection.    Urinalysis, Microscopic Only - Urine, Clean Catch [[id number]]  (Abnormal) Collected: 05/12/23 1454    Specimen: Urine, Clean Catch Updated: 05/12/23 1533     RBC, UA None Seen /HPF      WBC, UA 3-5 /HPF      Comment: Urine culture not indicated.        Bacteria, UA None Seen /HPF      Squamous Epithelial Cells, UA 0-2 /HPF      Hyaline Casts, UA None Seen /LPF      Amorphous Crystals, UA Small/1+ /HPF      Methodology Manual Light Microscopy    Urine Culture - Urine, Urine, Clean Catch [[id number]] Collected: 05/12/23 1454    Specimen: Urine, Clean Catch Updated: 05/12/23 1757    S. Pneumo Ag Urine or CSF - Urine, Urine, Clean Catch [[id number]]  (Normal) Collected: 05/12/23 1454    Specimen: Urine, Clean Catch Updated: 05/12/23 1939     Strep Pneumo Ag Negative    Legionella Antigen, Urine - Urine, Urine, Clean Catch [[id number]]  (Normal) Collected: 05/12/23 1454    Specimen: Urine, Clean Catch Updated: 05/12/23 1938     LEGIONELLA ANTIGEN, URINE Negative    STAT Lactic Acid, Reflex [[id number]]  (Normal) Collected: 05/12/23 1829    Specimen: Blood Updated: 05/12/23 1858     Lactate 2.0 mmol/L     POC Glucose Once [[id number]]  (Abnormal) Collected: 05/12/23 2038    Specimen: Blood Updated: 05/12/23 2039     Glucose 185 mg/dL      Comment: Serial Number: [id number]:  [id number]              Imaging:    XR Shoulder 2+  View Right    Result Date: 5/12/2023  PROCEDURE: XR SHOULDER 2+ VW RIGHT  COMPARISON: None  INDICATIONS: CHRONIC RIGHT SHOULDER PAIN  FINDINGS:  There is joint space narrowing at the glenohumeral joint with mild reactive sclerosis and marginal osteophyte formation.  There are marked hypertrophic changes at the AC joint.  No fractures are identified.        1. Radiographic changes of osteoarthritis involving the glenohumeral and AC joints.      Jose Waterman MD       Electronically Signed and Approved By: Jose Waterman MD on 5/12/2023 at 13:21             XR Foot 2 View Right    Result Date: 5/12/2023  PROCEDURE: XR FOOT 2 VW RIGHT  COMPARISON: [facility], CR, XR FOOT 3+ VW RIGHT, 3/28/2023, 2:50.  INDICATIONS: Right foot Infection.  FINDINGS:  There are postoperative changes of prior transmetatarsal amputation.  Air is identified in the soft tissues both along the plantar surface of the foot just proximal to the amputation site and within the soft tissues immediately adjacent to the cuboid.  There is increasing irregularity and bone destruction involving the distal aspect of the cuboid.  There is an increase in periosteal reaction around the amputated bases of the cuneiforms.        1. Postop changes of transmetatarsal amputation of the right foot 2. Evidence of osteomyelitis involving the distal aspect of the cuboid 3. Gas in the soft tissues along the plantar aspect of the foot just proximal to the amputation site. 4. Periostitis involving the cuneiform is which may be infectious or postsurgical.      Jose Waterman MD       Electronically Signed and Approved By: Jose Waterman MD on 5/12/2023 at 15:19             XR Chest 1 View    Result Date: 5/12/2023  PROCEDURE: XR CHEST 1 VW  COMPARISON: [facility], CR, XR CHEST 1 VW, 3/27/2023, 19:35.  INDICATIONS: cough  FINDINGS:  The heart is enlarged.  There is a right upper lobe airspace opacity consistent with pneumonia.  Density at the  left base could be atelectasis.       Right upper lobe pneumonia.  ADALBERTO HASSAN MD       Electronically Signed and Approved By: ADALBERTO HASSAN MD on 5/12/2023 at 14:37             XR Hip With or Without Pelvis 2 - 3 View Left    Result Date: 5/12/2023  PROCEDURE: XR HIP W OR WO PELVIS 2-3 VIEW LEFT  COMPARISON: [facility], CR, XR HIP W OR WO PELVIS 2-3 VIEW LEFT, 3/28/2023, 2:50.  INDICATIONS: CHRONIC LEFT HIP PAIN  FINDINGS:  There is joint space narrowing identified in both the right and left hip, worse on the left.  There is reactive sclerosis in the femoral heads and acetabular roofs.  There is marginal spur formation.  No fractures are identified.        1. Osteoarthritis of both hips, worse on the left.      Jose Waterman MD       Electronically Signed and Approved By: Jose Waterman MD on 5/12/2023 at 13:20                 Differential Diagnosis and Discussion:      Joint Pain: Differential diagnosis includes but is not limited to polyarticular arthritis, gout, tendinitis, hemarthrosis, septic arthritis, rheumatoid arthritis, bursitis, degenerative joint disease, joint effusion, autoimmune disorder, trauma, and occult neoplasm.    All labs were reviewed and interpreted by me.  All X-rays impressions were independently interpreted by me.    MDM  Number of Diagnoses or Management Options  Acute febrile illness  Acute osteomyelitis of right foot  Atrial fibrillation with rapid ventricular response  Pneumonia of right upper lobe due to infectious organism  Sepsis without acute organ dysfunction, due to unspecified organism  Uncontrolled type 2 diabetes mellitus with hyperglycemia  Diagnosis management comments: I had the nursing staff perform a rectal temperature.  He was reported to be 103.    14:50 PM the nursing staff undressed the patient's wounds at this time.  I feel that the patient most likely has a soft tissue infection versus osteomyelitis of the right foot..  The patient's white blood cell  count returned at 20,000 at this time as well.  I feel the patient is probably septic.  Broad-spectrum antibiotics and IV fluids were prescribed at this time    Sepsis criteria was met in the emergency department and the Sepsis protocol (including antibiotic administration) was initiated.      SIRS criteria considered:   1.                     Temperature > 100.4 or <98.6    2.                     Heart Rate > 90    3.                     Respiratory Rate > 22    4.                     WBC > 12K or <4K.             Severe Sepsis:     Respiratory: Mechanical Ventilation or Bipap  Hypotension: SBP > 90 or MAP < 65  Renal: Creatinine > 2  Metabolic: Lactic Acid > 2  Hematologic: Platelets < 100K or INR > 1.5  Hepatic: BILI  >  2  CNS: Sudden AMS     Septic Shock:     Severe Sepsis + Persistent hypotension or Lactic Acid > 4     Normal saline bolus, Antibiotics, and final disposition was based on these definitions.        The patient developed A-fib with rapid ventricular spots while in the emergency room.  The patient does have a long history of A-fib..  The patient was given initial bolus of 20 Cardizem.  This did not help to control the patient's rate.  The patient was subsequently started on a Cardizem drip       Amount and/or Complexity of Data Reviewed  Clinical lab tests: reviewed  Tests in the radiology section of CPT®: reviewed  Tests in the medicine section of CPT®: reviewed  Decide to obtain previous medical records or to obtain history from someone other than the patient: yes  Discuss the patient with other providers: yes (4:00 PM : I discussed the case with Dr. Leyva, podiatrist.  We have reviewed the patient's physical exam, x-ray and laboratory findings.  Dr. Leyva was in the hospital and came down to evaluate the patient's foot.  He feels that the patient's foot represents more chronic osteomyelitis and acute myelitis.  He does not feel the patient needs to go the operating room at this time.  He  will see the patient in consultation please see his note for his evaluation    16:59 EDT  I discussed the case with the hospitalist, Dr Lamar.  We have discussed the patient's presenting symptoms, laboratory values, imaging and condition at the time of admission.  They will evaluate the patient in the emergency room and admit the patient to the hospital)    Critical Care  Total time providing critical care: 45 minutes (Total critical care time of.  Total critical care time documented does not include time spent on separately billed procedures for services of nurses or physician assistants.  I personally saw and examined the patient.  I have reviewed all diagnostic interpretations and treatment plans as written.  I was present for the key portions of any procedures performed and the inclusive time noted in any critical care statement.  Critical care time includes patient management by me, time spent at the patient bedside, time to review labs/ ABG's, imaging results, discussing patient care, documentation in the medical record and time spent with family or caregiver)             Social Determinants of Health:    Patient is independent, reliable, and has access to care.       Disposition and Care Coordination:    Admit:   Through independent evaluation of the patient's history, physical, and imperical data, the patient meets criteria for observation/admission to the hospital.    I have explained discharge medications and the need for follow up with the patient/caretakers. This was also printed in the discharge instructions. Patient was discharged with the following medications and follow up:      Medication List      No changes were made to your prescriptions during this visit.      No follow-up provider specified.     Final diagnoses:   Acute febrile illness   Sepsis without acute organ dysfunction, due to unspecified organism   Pneumonia of right upper lobe due to infectious organism   Uncontrolled type 2 diabetes  mellitus with hyperglycemia   Atrial fibrillation with rapid ventricular response   Acute osteomyelitis of right foot        ED Disposition     ED Disposition   Decision to Admit    Condition   --    Comment   Level of Care: Telemetry [5]   Diagnosis: Sepsis [5060063]   Isolate for COVID?: No [0]   Certification: I Certify That Inpatient Hospital Services Are Medically Necessary For Greater Than 2 Midnights               This medical record created using voice recognition software.           Ha Schwab DO  05/12/23 [id number]

## 2023-05-12 NOTE — CONSULTS
[facility]   HISTORY AND PHYSICAL    Patient Name: Jose Shaikh  : 1960  MRN: [id number]  Primary Care Physician:  Delia Cervantes, VALENTÍN  Date of admission: 2023    Subjective   Subjective     Chief Complaint: Consulted for right foot and left heel ulcers    HPI:    Jose Shaikh is a 62 y.o. male to the emergency room with shortness of breath.    Patient is well-known to the podiatry service.  His past medical history shows a diabetic male with a right midfoot amputation that is healing by secondary intention.  He also has a wound on the left plantar foot.  He has a difficult social situation due to financial constraints and housing issues.  Patient has a remote prior felony sex offender conviction which makes finding him housing or outpatient rehab placement challenging as most facilities turn him down. Patient now has his own apartment.     He was recently admitted to the hospital from 10/01 to 10/05/2022 for cellulitis and osteomyelitis of the right foot.  MRI revealed osteomyelitis of bones of the midfoot.  CTA showed two-vessel runoff to the foot.  Dr. Fuller did not feel that there was any vascular intervention that would help the patient heal better and that he would most likely need to proceed to a right sided BKA.  Patient declined.  He got 4 weeks of doxycycline and Augmentin.  He has previously undergone hyperbaric oxygen therapy and did not want to do so again.     Unfortunately the patient was feeling very poorly at his last visit and had evidence of wound infection and cellulitis and I was concerned about abscess and worsening osteomyelitis.  He was admitted from  to 2023.  Once again a right-sided BKA was recommended as it is evident that the right-sided amputation is not going to heal.  The patient declined this again because they could not guarantee him placement.  They did tell him that the SNF at [facility] except his insurance and would be willing to take him if  the insurance approved it but he did not want to proceed with the BKA if it was not guaranteed so decided that he would wait until he has to have it done emergently at some point in the future.     He still has home health and currently the wounds are being painted with Betadine and covered with dry gauze.  He says he is still finishing up some antibiotics that I gave him and then has a 1 month supply of Cipro and doxycycline that was prescribed at discharge from the hospital.  He continues to have discomfort in his feet from the wounds.  Home health does his dressings twice a week and he tries to do them at least 1 other time but they are hard for him to reach.    Due to shortness of breath the patient presented to the emergency room.    Dr. Leyva was contacted by the emergency room physician, Dr. Ha Schwab.  Dr. Sutherland was concerned if his foot was the source for his elevated white count.    I immediately came to the emergency room to evaluate the patient.    Patient states he has shortness of breath and generalized malaise.  Otherwise the patient denies any fevers, chills, nausea, nor vomiting.      Review of Systems   All systems were reviewed and negative except for: Right midfoot ulceration    Personal History     Past Medical History:   Diagnosis Date   • Absence of toe of right foot    • Acute osteomyelitis of left calcaneus  8/18/2021   • Anxiety and depression    • Arthritis    • Claustrophobia    • Corns and callus    • Diabetic ulcer of left heel associated with type 2 DM 8/18/2021   • Diabetic ulcer of left heel associated with type 2 DM 7/6/2021   • Diabetic ulcer of right midfoot associated with type 2 DM 8/18/2021   • Difficulty walking    • Essential hypertension 8/31/2021   • Hammertoe    • Hyperlipidemia LDL goal <100 8/31/2021   • Ingrown toenail    • Obesity    • Paroxysmal atrial fibrillation 8/31/2021   • Polyneuropathy    • Pressure ulcer, stage 1    • Tinea unguium    • Type 2 diabetes  mellitus with polyneuropathy        Past Surgical History:   Procedure Laterality Date   • CYST REMOVAL      center of back; benign   • INCISION AND DRAINAGE ABSCESS      back   • INCISION AND DRAINAGE LEG Right 12/10/2021    Procedure: INCISION AND DRAINAGE LOWER EXTREMITY;  Surgeon: Ash Leyva DPM;  Location: [facility];  Service: Podiatry;  Laterality: Right;   • OTHER SURGICAL HISTORY      Surgical clips left foot   • TOE SURGERY Right     Removal of 5th toe   • TRANS METATARSAL AMPUTATION Right 12/2/2021    Procedure: AMPUTATION TRANS METATARSAL;  Surgeon: Ash Leyva DPM;  Location: [facility] OR;  Service: Podiatry;  Laterality: Right;   • WRIST SURGERY Left     repair of injury       Family History: family history includes Cancer in his father; Heart disease in his brother, father, and mother. Otherwise pertinent FHx was reviewed and not pertinent to current issue.    Social History:  reports that he quit smoking about 31 years ago. His smoking use included cigarettes. He has never used smokeless tobacco. He reports current alcohol use. He reports current drug use. Drug: Marijuana.    Home Medications:  Dulaglutide, Insulin Lispro, acetaminophen, apixaban, ciprofloxacin, doxycycline, fluticasone, gabapentin, insulin detemir, lisinopril, meloxicam, metFORMIN, metoprolol succinate XL, and simvastatin      Allergies:  Allergies   Allergen Reactions   • Adhesive Tape Rash       Objective   Objective     Vitals:   Temp:  [97.3 °F (36.3 °C)-98.5 °F (36.9 °C)] 97.9 °F (36.6 °C)  Heart Rate:  [63-73] 63  Resp:  [16-18] 16  BP: (104-128)/(52-72) 128/57  Physical Exam      GEN:   A&Ox3, Patient seen at bedside in no apparent distress.    Physical Exam  Vitals reviewed.   Constitutional:       Appearance: He is ill-appearing.   Cardiovascular:      Pulses:           Dorsalis pedis pulses are absent on the right side and absent on the left side.        Posterior tibial pulses are absent on  the right side and absent on the left side.   Feet:      Right foot:      Protective Sensation: 4 sites tested.      Skin integrity: Ulcer present.      Left foot:      Protective Sensation: 10 sites tested.   Neurological:      Mental Status: He is alert.         Foot/Ankle Exam    GENERAL  Diabetic foot exam performed    Appearance:  appears ill (Morbidly obese)  Orientation:  AAOx3  Affect:  appropriate    VASCULAR     Right Foot Vascularity   Dorsalis pedis:  Absent  Posterior tibial:  Absent  Skin temperature:  cool  Edema gradin+ and pitting  Pedal hair growth:  Absent  Varicosities:  moderate varicosities     Left Foot Vascularity   Dorsalis pedis:  Absent  Posterior tibial:  Absent  Skin temperature:  cool  Edema gradin+ and pitting  CFT:  3  Pedal hair growth:  Absent  Varicosities:  moderate varicosities     NEUROLOGIC     Right Foot Neurologic   Light touch sensation: absent  Vibratory sensation: absent  Hot/Cold sensation: absent  Protective Sensation using Hedrick-Marcella Monofilament:   Sites tested: 4     Left Foot Neurologic   Light touch sensation: absent  Vibratory sensation: absent  Hot/Cold sensation:  absent  Protective Sensation using Hedrick-Marcella Monofilament:   Sites tested: 10    MUSCULOSKELETAL     Right Foot Musculoskeletal    Amputation   Trans metatarsal right foot: Yes      DERMATOLOGIC      Right Foot Dermatologic   Skin  Positive for ulcer.      Right foot additional comments: Stage III ulceration on right midfoot.  Serous drainage present.  No fluctuance.  No lymphangitis.  No edema no erythema seen.     Left foot additional comments: Stage III ulceration on left heel.  Serous drainage present.  No fluctuance.  No lymphangitis.  No edema no erythema seen.          XR Foot 2 View Right    Result Date: 2023  Narrative: PROCEDURE: XR FOOT 2 VW RIGHT  COMPARISON: [facility], CR, XR FOOT 3+ VW RIGHT, 3/28/2023, 2:50.  INDICATIONS: Right foot Infection.   FINDINGS:  There are postoperative changes of prior transmetatarsal amputation.  Air is identified in the soft tissues both along the plantar surface of the foot just proximal to the amputation site and within the soft tissues immediately adjacent to the cuboid.  There is increasing irregularity and bone destruction involving the distal aspect of the cuboid.  There is an increase in periosteal reaction around the amputated bases of the cuneiforms.      Impression:   1. Postop changes of transmetatarsal amputation of the right foot 2. Evidence of osteomyelitis involving the distal aspect of the cuboid 3. Gas in the soft tissues along the plantar aspect of the foot just proximal to the amputation site. 4. Periostitis involving the cuneiform is which may be infectious or postsurgical.      Jose Waterman MD       Electronically Signed and Approved By: Jose Waterman MD on 5/12/2023 at 15:19                  Result Review    Result Review:  I have personally reviewed the results from the time of this admission to 5/22/2023 12:57 EDT and agree with these findings:  [x]  Laboratory  []  Microbiology  [x]  Radiology  []  EKG/Telemetry   []  Cardiology/Vascular   []  Pathology  []  Old records  []  Other:      WBC   Date Value Ref Range Status   05/22/2023 7.57 3.40 - 10.80 10*3/mm3 Final     RBC   Date Value Ref Range Status   05/22/2023 4.63 4.14 - 5.80 10*6/mm3 Final     Hemoglobin   Date Value Ref Range Status   05/22/2023 11.3 (L) 13.0 - 17.7 g/dL Final     Hematocrit   Date Value Ref Range Status   05/22/2023 37.0 (L) 37.5 - 51.0 % Final     MCV   Date Value Ref Range Status   05/22/2023 79.9 79.0 - 97.0 fL Final     MCH   Date Value Ref Range Status   05/22/2023 24.4 (L) 26.6 - 33.0 pg Final     MCHC   Date Value Ref Range Status   05/22/2023 30.5 (L) 31.5 - 35.7 g/dL Final     RDW   Date Value Ref Range Status   05/22/2023 16.4 (H) 12.3 - 15.4 % Final     RDW-SD   Date Value Ref Range Status   05/22/2023 46.8 37.0 -  54.0 fl Final     MPV   Date Value Ref Range Status   05/22/2023 9.8 6.0 - 12.0 fL Final     Platelets   Date Value Ref Range Status   05/22/2023 257 140 - 450 10*3/mm3 Final     Neutrophil %   Date Value Ref Range Status   05/22/2023 75.3 42.7 - 76.0 % Final     Lymphocyte %   Date Value Ref Range Status   05/22/2023 14.8 (L) 19.6 - 45.3 % Final     Monocyte %   Date Value Ref Range Status   05/22/2023 7.9 5.0 - 12.0 % Final     Eosinophil %   Date Value Ref Range Status   05/22/2023 0.9 0.3 - 6.2 % Final     Basophil %   Date Value Ref Range Status   05/22/2023 0.4 0.0 - 1.5 % Final     Immature Grans %   Date Value Ref Range Status   05/22/2023 0.7 (H) 0.0 - 0.5 % Final     Neutrophils, Absolute   Date Value Ref Range Status   05/22/2023 5.70 1.70 - 7.00 10*3/mm3 Final     Lymphocytes, Absolute   Date Value Ref Range Status   05/22/2023 1.12 0.70 - 3.10 10*3/mm3 Final     Monocytes, Absolute   Date Value Ref Range Status   05/22/2023 0.60 0.10 - 0.90 10*3/mm3 Final     Eosinophils, Absolute   Date Value Ref Range Status   05/22/2023 0.07 0.00 - 0.40 10*3/mm3 Final     Basophils, Absolute   Date Value Ref Range Status   05/22/2023 0.03 0.00 - 0.20 10*3/mm3 Final     Immature Grans, Absolute   Date Value Ref Range Status   05/22/2023 0.05 0.00 - 0.05 10*3/mm3 Final     nRBC   Date Value Ref Range Status   05/22/2023 0.0 0.0 - 0.2 /100 WBC Final        Lab Results   Component Value Date    GLUCOSE 205 (H) 05/22/2023    BUN 10 05/22/2023    CREATININE 0.53 (L) 05/22/2023    EGFRIFNONA 134 12/20/2021    BCR 18.9 05/22/2023    K 4.3 05/22/2023    CO2 30.8 (H) 05/22/2023    CALCIUM 8.6 05/22/2023    ALBUMIN 2.3 (L) 05/22/2023    LABIL2 1.3 (L) 08/07/2019    AST 64 (H) 05/22/2023    ALT 37 05/22/2023             Most notable findings include: Right foot ulcer    Assessment & Plan   Assessment / Plan       Active Hospital Problems:  Active Hospital Problems    Diagnosis    • **Sepsis    • Right foot pain    •  Osteomyelitis of foot, right, acute    • Wound of foot    • Diabetic ulcer of right midfoot associated with type 2 DM        Plan:    Comprehensive lower extremity examination and evaluation was performed.    Discussed findings and treatment plan including risks, benefits, and treatment options with patient in detail. Patient agreed with treatment plan.    Dr. Leyva discussed findings with Dr. Ha Schwab.  This felt that his elevated white blood cell count is not from his foot and most likely from pulmonary source.    Wound care nurse will be consulted.    Patient to be followed upon admission.    Thank you for including me in the care of this patient.    DVT prophylaxis:  Medical DVT prophylaxis orders are present.    CODE STATUS:    Level Of Support Discussed With: Patient  Code Status (Patient has no pulse and is not breathing): CPR (Attempt to Resuscitate)  Medical Interventions (Patient has pulse or is breathing): Full Support      Electronically signed by Ash Leyva DPM, 05/12/23, 4:27 PM EDT.

## 2023-05-12 NOTE — LETTER
[facility]  [address]  [place] [address]  [phone]        June 6, 2023      Patient: Jose Shaikh  YOB: 1960  Date of Visit: 5/12/2023    Rehab placement-    Thank you,  Ella May, MSW  [phone]

## 2023-05-13 LAB
ALBUMIN SERPL-MCNC: 2.3 G/DL (ref 3.5–5.2)
ALBUMIN/GLOB SERPL: 0.7 G/DL
ALP SERPL-CCNC: 160 U/L (ref 39–117)
ALT SERPL W P-5'-P-CCNC: 18 U/L (ref 1–41)
ANION GAP SERPL CALCULATED.3IONS-SCNC: 10 MMOL/L (ref 5–15)
AST SERPL-CCNC: 22 U/L (ref 1–40)
BACTERIA BLD CULT: ABNORMAL
BACTERIA SPEC AEROBE CULT: NO GROWTH
BASOPHILS # BLD AUTO: 0.04 10*3/MM3 (ref 0–0.2)
BASOPHILS NFR BLD AUTO: 0.3 % (ref 0–1.5)
BILIRUB SERPL-MCNC: 1.2 MG/DL (ref 0–1.2)
BUN SERPL-MCNC: 17 MG/DL (ref 8–23)
BUN/CREAT SERPL: 23.6 (ref 7–25)
CALCIUM SPEC-SCNC: 7.6 MG/DL (ref 8.6–10.5)
CHLORIDE SERPL-SCNC: 98 MMOL/L (ref 98–107)
CO2 SERPL-SCNC: 21 MMOL/L (ref 22–29)
CREAT SERPL-MCNC: 0.72 MG/DL (ref 0.76–1.27)
CRP SERPL-MCNC: 24.94 MG/DL (ref 0–0.5)
DEPRECATED RDW RBC AUTO: 46.6 FL (ref 37–54)
EGFRCR SERPLBLD CKD-EPI 2021: 102 ML/MIN/1.73
EOSINOPHIL # BLD AUTO: 0.06 10*3/MM3 (ref 0–0.4)
EOSINOPHIL NFR BLD AUTO: 0.4 % (ref 0.3–6.2)
ERYTHROCYTE [DISTWIDTH] IN BLOOD BY AUTOMATED COUNT: 16.5 % (ref 12.3–15.4)
GLOBULIN UR ELPH-MCNC: 3.5 GM/DL
GLUCOSE BLDC GLUCOMTR-MCNC: 181 MG/DL (ref 70–99)
GLUCOSE BLDC GLUCOMTR-MCNC: 200 MG/DL (ref 70–99)
GLUCOSE BLDC GLUCOMTR-MCNC: 267 MG/DL (ref 70–99)
GLUCOSE BLDC GLUCOMTR-MCNC: 277 MG/DL (ref 70–99)
GLUCOSE SERPL-MCNC: 203 MG/DL (ref 65–99)
HCT VFR BLD AUTO: 33 % (ref 37.5–51)
HGB BLD-MCNC: 10.4 G/DL (ref 13–17.7)
IMM GRANULOCYTES # BLD AUTO: 0.41 10*3/MM3 (ref 0–0.05)
IMM GRANULOCYTES NFR BLD AUTO: 2.7 % (ref 0–0.5)
LYMPHOCYTES # BLD AUTO: 1.17 10*3/MM3 (ref 0.7–3.1)
LYMPHOCYTES NFR BLD AUTO: 7.8 % (ref 19.6–45.3)
MAGNESIUM SERPL-MCNC: 1.7 MG/DL (ref 1.6–2.4)
MCH RBC QN AUTO: 24.8 PG (ref 26.6–33)
MCHC RBC AUTO-ENTMCNC: 31.5 G/DL (ref 31.5–35.7)
MCV RBC AUTO: 78.8 FL (ref 79–97)
MONOCYTES # BLD AUTO: 1.02 10*3/MM3 (ref 0.1–0.9)
MONOCYTES NFR BLD AUTO: 6.8 % (ref 5–12)
MRSA DNA SPEC QL NAA+PROBE: NORMAL
NEUTROPHILS NFR BLD AUTO: 12.39 10*3/MM3 (ref 1.7–7)
NEUTROPHILS NFR BLD AUTO: 82 % (ref 42.7–76)
NRBC BLD AUTO-RTO: 0 /100 WBC (ref 0–0.2)
PLATELET # BLD AUTO: 168 10*3/MM3 (ref 140–450)
PMV BLD AUTO: 11.6 FL (ref 6–12)
POTASSIUM SERPL-SCNC: 3.9 MMOL/L (ref 3.5–5.2)
PROCALCITONIN SERPL-MCNC: 1.38 NG/ML (ref 0–0.25)
PROT SERPL-MCNC: 5.8 G/DL (ref 6–8.5)
QT INTERVAL: 277 MS
QT INTERVAL: 326 MS
RBC # BLD AUTO: 4.19 10*6/MM3 (ref 4.14–5.8)
SODIUM SERPL-SCNC: 129 MMOL/L (ref 136–145)
WBC NRBC COR # BLD: 15.09 10*3/MM3 (ref 3.4–10.8)

## 2023-05-13 PROCEDURE — 87077 CULTURE AEROBIC IDENTIFY: CPT | Performed by: INTERNAL MEDICINE

## 2023-05-13 PROCEDURE — 85025 COMPLETE CBC W/AUTO DIFF WBC: CPT | Performed by: INTERNAL MEDICINE

## 2023-05-13 PROCEDURE — 87205 SMEAR GRAM STAIN: CPT | Performed by: INTERNAL MEDICINE

## 2023-05-13 PROCEDURE — 94799 UNLISTED PULMONARY SVC/PX: CPT

## 2023-05-13 PROCEDURE — 63710000001 INSULIN LISPRO (HUMAN) PER 5 UNITS: Performed by: INTERNAL MEDICINE

## 2023-05-13 PROCEDURE — G8404 LOW EXTEMITY NEUR EXAM DOCUM: HCPCS | Performed by: PODIATRIST

## 2023-05-13 PROCEDURE — 99233 SBSQ HOSP IP/OBS HIGH 50: CPT | Performed by: PODIATRIST

## 2023-05-13 PROCEDURE — 87147 CULTURE TYPE IMMUNOLOGIC: CPT | Performed by: INTERNAL MEDICINE

## 2023-05-13 PROCEDURE — 87641 MR-STAPH DNA AMP PROBE: CPT | Performed by: INTERNAL MEDICINE

## 2023-05-13 PROCEDURE — 80053 COMPREHEN METABOLIC PANEL: CPT | Performed by: INTERNAL MEDICINE

## 2023-05-13 PROCEDURE — 25010000002 LINEZOLID 600 MG/300ML SOLUTION: Performed by: INTERNAL MEDICINE

## 2023-05-13 PROCEDURE — 25010000002 DIGOXIN PER 500 MCG: Performed by: INTERNAL MEDICINE

## 2023-05-13 PROCEDURE — 25010000002 MORPHINE PER 10 MG: Performed by: INTERNAL MEDICINE

## 2023-05-13 PROCEDURE — 87040 BLOOD CULTURE FOR BACTERIA: CPT | Performed by: INTERNAL MEDICINE

## 2023-05-13 PROCEDURE — 86140 C-REACTIVE PROTEIN: CPT | Performed by: INTERNAL MEDICINE

## 2023-05-13 PROCEDURE — 63710000001 INSULIN DETEMIR PER 5 UNITS: Performed by: INTERNAL MEDICINE

## 2023-05-13 PROCEDURE — 25010000002 ENOXAPARIN PER 10 MG: Performed by: INTERNAL MEDICINE

## 2023-05-13 PROCEDURE — 99233 SBSQ HOSP IP/OBS HIGH 50: CPT | Performed by: INTERNAL MEDICINE

## 2023-05-13 PROCEDURE — 25010000002 PIPERACILLIN SOD-TAZOBACTAM PER 1 G: Performed by: INTERNAL MEDICINE

## 2023-05-13 PROCEDURE — 84145 PROCALCITONIN (PCT): CPT | Performed by: INTERNAL MEDICINE

## 2023-05-13 PROCEDURE — 87070 CULTURE OTHR SPECIMN AEROBIC: CPT | Performed by: INTERNAL MEDICINE

## 2023-05-13 PROCEDURE — 11720 DEBRIDE NAIL 1-5: CPT | Performed by: PODIATRIST

## 2023-05-13 PROCEDURE — 87185 SC STD ENZYME DETCJ PER NZM: CPT | Performed by: INTERNAL MEDICINE

## 2023-05-13 PROCEDURE — 11042 DBRDMT SUBQ TIS 1ST 20SQCM/<: CPT | Performed by: PODIATRIST

## 2023-05-13 PROCEDURE — 25010000002 KETOROLAC TROMETHAMINE PER 15 MG: Performed by: INTERNAL MEDICINE

## 2023-05-13 PROCEDURE — 83735 ASSAY OF MAGNESIUM: CPT | Performed by: INTERNAL MEDICINE

## 2023-05-13 PROCEDURE — 82948 REAGENT STRIP/BLOOD GLUCOSE: CPT

## 2023-05-13 RX ORDER — PREGABALIN 25 MG/1
25 CAPSULE ORAL EVERY 8 HOURS SCHEDULED
Status: DISCONTINUED | OUTPATIENT
Start: 2023-05-13 | End: 2023-06-15 | Stop reason: HOSPADM

## 2023-05-13 RX ORDER — METOPROLOL SUCCINATE 50 MG/1
50 TABLET, EXTENDED RELEASE ORAL DAILY
Status: DISCONTINUED | OUTPATIENT
Start: 2023-05-13 | End: 2023-05-15

## 2023-05-13 RX ORDER — ALUMINA, MAGNESIA, AND SIMETHICONE 2400; 2400; 240 MG/30ML; MG/30ML; MG/30ML
15 SUSPENSION ORAL EVERY 6 HOURS PRN
Status: DISCONTINUED | OUTPATIENT
Start: 2023-05-13 | End: 2023-06-15 | Stop reason: HOSPADM

## 2023-05-13 RX ORDER — BISACODYL 5 MG/1
5 TABLET, DELAYED RELEASE ORAL DAILY PRN
Status: DISCONTINUED | OUTPATIENT
Start: 2023-05-13 | End: 2023-06-15 | Stop reason: HOSPADM

## 2023-05-13 RX ORDER — DIGOXIN 0.25 MG/ML
250 INJECTION INTRAMUSCULAR; INTRAVENOUS ONCE
Status: COMPLETED | OUTPATIENT
Start: 2023-05-13 | End: 2023-05-13

## 2023-05-13 RX ORDER — BISACODYL 10 MG
10 SUPPOSITORY, RECTAL RECTAL DAILY PRN
Status: DISCONTINUED | OUTPATIENT
Start: 2023-05-13 | End: 2023-06-15 | Stop reason: HOSPADM

## 2023-05-13 RX ORDER — AMOXICILLIN 250 MG
2 CAPSULE ORAL 2 TIMES DAILY
Status: DISCONTINUED | OUTPATIENT
Start: 2023-05-13 | End: 2023-06-06

## 2023-05-13 RX ORDER — KETOROLAC TROMETHAMINE 15 MG/ML
15 INJECTION, SOLUTION INTRAMUSCULAR; INTRAVENOUS EVERY 6 HOURS PRN
Status: DISCONTINUED | OUTPATIENT
Start: 2023-05-13 | End: 2023-05-17

## 2023-05-13 RX ORDER — POLYETHYLENE GLYCOL 3350 17 G/17G
17 POWDER, FOR SOLUTION ORAL DAILY PRN
Status: DISCONTINUED | OUTPATIENT
Start: 2023-05-13 | End: 2023-06-15 | Stop reason: HOSPADM

## 2023-05-13 RX ORDER — MORPHINE SULFATE 2 MG/ML
1 INJECTION, SOLUTION INTRAMUSCULAR; INTRAVENOUS EVERY 4 HOURS PRN
Status: DISCONTINUED | OUTPATIENT
Start: 2023-05-13 | End: 2023-05-15

## 2023-05-13 RX ORDER — KETOTIFEN FUMARATE 0.35 MG/ML
1 SOLUTION/ DROPS OPHTHALMIC 2 TIMES DAILY
Status: DISCONTINUED | OUTPATIENT
Start: 2023-05-13 | End: 2023-05-16

## 2023-05-13 RX ORDER — CALCIUM CARBONATE 500 MG/1
2 TABLET, CHEWABLE ORAL 3 TIMES DAILY PRN
Status: DISCONTINUED | OUTPATIENT
Start: 2023-05-13 | End: 2023-06-15 | Stop reason: HOSPADM

## 2023-05-13 RX ORDER — GABAPENTIN 300 MG/1
300 CAPSULE ORAL 3 TIMES DAILY
Status: DISCONTINUED | OUTPATIENT
Start: 2023-05-13 | End: 2023-05-13

## 2023-05-13 RX ORDER — ATORVASTATIN CALCIUM 10 MG/1
10 TABLET, FILM COATED ORAL NIGHTLY
Status: DISCONTINUED | OUTPATIENT
Start: 2023-05-13 | End: 2023-06-15 | Stop reason: HOSPADM

## 2023-05-13 RX ORDER — FAMOTIDINE 20 MG/1
40 TABLET, FILM COATED ORAL DAILY
Status: DISCONTINUED | OUTPATIENT
Start: 2023-05-13 | End: 2023-06-15 | Stop reason: HOSPADM

## 2023-05-13 RX ORDER — OXYCODONE HYDROCHLORIDE 5 MG/1
10 TABLET ORAL EVERY 6 HOURS PRN
Status: DISCONTINUED | OUTPATIENT
Start: 2023-05-13 | End: 2023-06-15 | Stop reason: HOSPADM

## 2023-05-13 RX ORDER — NALOXONE HCL 0.4 MG/ML
0.4 VIAL (ML) INJECTION
Status: DISCONTINUED | OUTPATIENT
Start: 2023-05-13 | End: 2023-05-15

## 2023-05-13 RX ADMIN — PIPERACILLIN SODIUM AND TAZOBACTAM SODIUM 4.5 G: 4; .5 INJECTION, POWDER, LYOPHILIZED, FOR SOLUTION INTRAVENOUS at 17:15

## 2023-05-13 RX ADMIN — PIPERACILLIN SODIUM AND TAZOBACTAM SODIUM 4.5 G: 4; .5 INJECTION, POWDER, LYOPHILIZED, FOR SOLUTION INTRAVENOUS at 01:50

## 2023-05-13 RX ADMIN — DILTIAZEM HYDROCHLORIDE 30 MG: 30 TABLET, FILM COATED ORAL at 17:16

## 2023-05-13 RX ADMIN — LINEZOLID 600 MG: 600 INJECTION, SOLUTION INTRAVENOUS at 08:37

## 2023-05-13 RX ADMIN — INSULIN DETEMIR 15 UNITS: 100 INJECTION, SOLUTION SUBCUTANEOUS at 20:25

## 2023-05-13 RX ADMIN — DIGOXIN 250 MCG: 0.25 INJECTION INTRAMUSCULAR; INTRAVENOUS at 16:12

## 2023-05-13 RX ADMIN — METOPROLOL TARTRATE 2.5 MG: 1 INJECTION, SOLUTION INTRAVENOUS at 00:37

## 2023-05-13 RX ADMIN — FAMOTIDINE 40 MG: 20 TABLET ORAL at 11:14

## 2023-05-13 RX ADMIN — ATORVASTATIN CALCIUM 10 MG: 10 TABLET, FILM COATED ORAL at 20:24

## 2023-05-13 RX ADMIN — ACETAMINOPHEN 1000 MG: 325 TABLET ORAL at 06:11

## 2023-05-13 RX ADMIN — PREGABALIN 25 MG: 25 CAPSULE ORAL at 21:36

## 2023-05-13 RX ADMIN — INSULIN LISPRO 4 UNITS: 100 INJECTION, SOLUTION INTRAVENOUS; SUBCUTANEOUS at 17:15

## 2023-05-13 RX ADMIN — ENOXAPARIN SODIUM 40 MG: 100 INJECTION SUBCUTANEOUS at 08:37

## 2023-05-13 RX ADMIN — KETOTIFEN FUMARATE 1 DROP: 0.25 SOLUTION OPHTHALMIC at 20:28

## 2023-05-13 RX ADMIN — DILTIAZEM HYDROCHLORIDE 30 MG: 30 TABLET, FILM COATED ORAL at 23:08

## 2023-05-13 RX ADMIN — APIXABAN 5 MG: 5 TABLET, FILM COATED ORAL at 11:15

## 2023-05-13 RX ADMIN — MORPHINE SULFATE 1 MG: 2 INJECTION, SOLUTION INTRAMUSCULAR; INTRAVENOUS at 16:12

## 2023-05-13 RX ADMIN — HYDROCODONE BITARTRATE AND ACETAMINOPHEN 1 TABLET: 5; 325 TABLET ORAL at 09:25

## 2023-05-13 RX ADMIN — APIXABAN 5 MG: 5 TABLET, FILM COATED ORAL at 20:24

## 2023-05-13 RX ADMIN — Medication 10 ML: at 08:38

## 2023-05-13 RX ADMIN — MORPHINE SULFATE 1 MG: 2 INJECTION, SOLUTION INTRAMUSCULAR; INTRAVENOUS at 23:08

## 2023-05-13 RX ADMIN — MORPHINE SULFATE 1 MG: 2 INJECTION, SOLUTION INTRAMUSCULAR; INTRAVENOUS at 11:14

## 2023-05-13 RX ADMIN — DILTIAZEM HYDROCHLORIDE 7.5 MG/HR: 5 INJECTION INTRAVENOUS at 07:39

## 2023-05-13 RX ADMIN — PIPERACILLIN SODIUM AND TAZOBACTAM SODIUM 4.5 G: 4; .5 INJECTION, POWDER, LYOPHILIZED, FOR SOLUTION INTRAVENOUS at 11:14

## 2023-05-13 RX ADMIN — LINEZOLID 600 MG: 600 INJECTION, SOLUTION INTRAVENOUS at 19:23

## 2023-05-13 RX ADMIN — HYDROCODONE BITARTRATE AND ACETAMINOPHEN 1 TABLET: 5; 325 TABLET ORAL at 03:27

## 2023-05-13 RX ADMIN — METOPROLOL SUCCINATE 50 MG: 50 TABLET, EXTENDED RELEASE ORAL at 11:15

## 2023-05-13 RX ADMIN — KETOROLAC TROMETHAMINE 15 MG: 15 INJECTION, SOLUTION INTRAMUSCULAR; INTRAVENOUS at 20:25

## 2023-05-13 RX ADMIN — Medication 10 ML: at 20:26

## 2023-05-13 RX ADMIN — INSULIN LISPRO 3 UNITS: 100 INJECTION, SOLUTION INTRAVENOUS; SUBCUTANEOUS at 11:33

## 2023-05-13 RX ADMIN — INSULIN DETEMIR 15 UNITS: 100 INJECTION, SOLUTION SUBCUTANEOUS at 11:33

## 2023-05-13 RX ADMIN — PREGABALIN 25 MG: 25 CAPSULE ORAL at 16:12

## 2023-05-13 NOTE — PROGRESS NOTES
[facility]   Hospitalist Progress Note  Date: 2023  Patient Name: Jose Shaikh  : 1960  MRN: [id number]  Date of admission: 2023      Subjective   Subjective     Chief Complaint: Left hip pain    Summary:   Jose Shaikh is a 62 y.o. male past medical history of osteomyelitis, type 2 diabetes, hypertension, A-fib, dyslipidemia, obesity with BMI of 48, who was recently hospital for osteomyelitis and he returns due to hip pain.        Patient was recently admitted to the hospital in early April for foot infection.  There was recommendation for amputation due to osteomyelitis in the foot and the patient refused.  He was unable to be placed at that time.  He is a sex offender making it very difficult for placement although [facility] was an option.  As result, he opted to go home on oral antibiotics to treat Alcaligenes facialis and Morganella morganii with doxycycline and ciprofloxacin.  The patient was seen in wound care in middle of April and at that time Dr. Daniels attempted to switch antibiotics to Levaquin per pharmacy recommendations based off culture data but was unable to get hold the patient so he was never switched.  The patient states the only reason he came to the emergency department was because his left hip was hurting.  Unaware of fevers.  Chills.  Cough.  Shortness of breath.  The patient came to the ER for hip pain.        In the emergency department the patient's vital signs are as follows temperature is 103.4, pulse is 139, blood pressure is 92/65, 95% on room air.  CBC shows a white blood cell count of 20.55 with neutrophils of 88.2.  CRP is 20.53, which is up from 7.4 in April and lactate is 2.6.  Sed rate was 42 in April and is currently 75.  Bicarb is 19.9 and sodium is 130.  Urinalysis shows positive nitrite and small leuk esterase but no bacteria.  Patient also seems to have a pneumonia in his right upper lobe.  X-ray of the right foot shows evidence of  osteomyelitis involving the distal aspect of the cuboid and gas in the soft tissue along the plantar aspect of the foot just proximal amputation site and periostitis involving the cuneiform.  Patient got 1 L of normal saline.  Patient received Zosyn and vancomycin for antibiotics.  Podiatry was consulted and did not feel like the foot was the source.  Patient will be admitted to the hospital for severe sepsis with unclear source at this time.  The source could be pneumonia, urinary tract infection or most likely the foot.  Patient will also be managed with A-fib with RVR.  Patient blood culture grew Streptococcus agalactiae.  Zyvox DC'd.  Patient does not want any amputation.    Interval Followup:   A-fib rate controlled remains on Cardizem drip.  Tmax 103.  Remains on room air with 91% saturation  Denies any palpitations or chest pain.  Does have cough with mucus.  Complaining of pain in left hip more than right and asking for pain medications.  Patient has bad arthritis and oral pain medication does not help much.    Review of Systems   All systems were reviewed and negative except for: Summary and interval follow-up    Objective   Objective     Vitals:   Temp:  [98.2 °F (36.8 °C)-102.4 °F (39.1 °C)] 98.2 °F (36.8 °C)  Heart Rate:  [] 82  Resp:  [16] 16  BP: ()/() 106/74  Physical Exam      Constitutional: Awake, alert, no acute distress              Eyes: Pupils equal, sclerae anicteric, no conjunctival injection              HENT: NCAT, mucous membranes moist              Neck: Supple, no thyromegaly, no lymphadenopathy, trachea midline              Respiratory coarse breath sounds.  Body habitus makes it difficult to get a good lung exam              Cardiovascular: Irregularly irregular, no murmurs, rubs, or gallops, palpable pedal pulses bilaterally              Gastrointestinal: Positive bowel sounds, soft, nontender, nondistended              Musculoskeletal: No bilateral ankle edema, no  clubbing or cyanosis to extremities              Psychiatric: Appropriate affect, cooperative              Neurologic: Oriented x 3, strength symmetric in all extremities, Cranial Nerves grossly intact to confrontation, speech clear              Skin: No rashes.  Right foot shows amputation transmetatarsal.  There is a dehisced wound there is area of warmth and redness.  Right foot is not dressed.       Result Review    Result Review:  I have personally reviewed the results for the past 24 hours and agree with these findings:  [x]  Laboratory  [x]  Microbiology  [x]  Radiology  [x]  EKG/Telemetry A-fib 140  []  Cardiology/Vascular   []  Pathology  [x]  Old records  [x]  Other: Medications    Assessment & Plan   Assessment / Plan     Assessment:  Sepsis present on admission.  Patient has fever, tachycardia lactic acidosis, elevated procalcitonin and leukocytosis.  Right upper lobe healthcare associated pneumonia possibly gram-negative.  Chronic osteomyelitis of right foot involving cuboid and may be cuneiform.  Paroxysmal A-fib now with RVR on Eliquis.  Bilateral diabetic foot ulcers.  NIDDM.  Morbid obesity BMI of 48.1.  S/p TMA of right foot.  Hyponatremia.  Likely from hyperglycemia.  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    Plan:  Continue IV Zosyn.  DC Zyvox based on blood culture data.  Check wound culture.  Await culture data  Appreciate podiatry input.  Per podiatry right foot wound not source of infection.  Patient does not want below-knee amputation.  Wean off Cardizem drip.  IV dig x1.  P.o. Cardizem.  Resumed home metoprolol.  IV and oral narcotics for pain control.  We will start Lyrica.  Check MRSA PCR.  Sliding-scale insulin and carb consistent diet.  Wound nurse to evaluate patient.  PT OT.  Continue telemetry    Discussed plan with RN.  Return home when stable    DVT prophylaxis:  Medical DVT prophylaxis orders are present.  Home Eliquis    CODE STATUS:   Level Of Support Discussed With:  Patient  Code Status (Patient has no pulse and is not breathing): CPR (Attempt to Resuscitate)  Medical Interventions (Patient has pulse or is breathing): Full Support      Part of this note may be an electronic transcription/translation of spoken language to printed text using the Dragon Dictation System.     Electronically signed by Shekhar Au MD, 05/13/23, 3:44 PM EDT.

## 2023-05-13 NOTE — PLAN OF CARE
Goal Outcome Evaluation:    Patient is alert and oriented x 4, admitted from ed, on room air, with cardizem drip at 15 mg/hr, heart rate is on 150's blood pressure is 125/90 ,lopressor iv given as needed, decreased the rate of iv Cardizem to 7.5 mg/hr, patient is frequently seeking for pain medication, pain medication given as needed, no pertinent change in condition, dressing applied to his right foot and left heel,to continue care plan.

## 2023-05-13 NOTE — PROGRESS NOTES
[facility] - PODIATRY    Today's Date: 05/22/23    Patient Name: Jose Shaikh  MRN: [id number]  CSN: [id number]  PCP: Delia Cervantes APRN  Referring Provider: Shekhar Au MD  Attending Provider: Max Mehta MD  Length of Stay: 10    SUBJECTIVE   Chief Complaint: Bilateral foot ulcerations    HPI: Jose Shaikh, a 62 y.o.male,     Patient complains of toenails on his left foot in addition to his ulcerations bilaterally.    Patient still continues to refuse a right below-knee amputation.    Complains of painful toenails on left foot.    Otherwise patient has no new podiatric complaints since he was seen yesterday afternoon.    Past Medical History:   Diagnosis Date   • Absence of toe of right foot    • Acute osteomyelitis of left calcaneus  8/18/2021   • Anxiety and depression    • Arthritis    • Claustrophobia    • Corns and callus    • Diabetic ulcer of left heel associated with type 2 DM 8/18/2021   • Diabetic ulcer of left heel associated with type 2 DM 7/6/2021   • Diabetic ulcer of right midfoot associated with type 2 DM 8/18/2021   • Difficulty walking    • Essential hypertension 8/31/2021   • Hammertoe    • Hyperlipidemia LDL goal <100 8/31/2021   • Ingrown toenail    • Obesity    • Paroxysmal atrial fibrillation 8/31/2021   • Polyneuropathy    • Pressure ulcer, stage 1    • Tinea unguium    • Type 2 diabetes mellitus with polyneuropathy      Past Surgical History:   Procedure Laterality Date   • CYST REMOVAL      center of back; benign   • INCISION AND DRAINAGE ABSCESS      back   • INCISION AND DRAINAGE LEG Right 12/10/2021    Procedure: INCISION AND DRAINAGE LOWER EXTREMITY;  Surgeon: Ash Leyva DPM;  Location: [facility] MAIN OR;  Service: Podiatry;  Laterality: Right;   • OTHER SURGICAL HISTORY      Surgical clips left foot   • TOE SURGERY Right     Removal of 5th toe   • TRANS METATARSAL AMPUTATION Right 12/2/2021    Procedure: AMPUTATION TRANS METATARSAL;   Surgeon: Ash Leyva DPM;  Location: [facility] MAIN OR;  Service: Podiatry;  Laterality: Right;   • WRIST SURGERY Left     repair of injury     Family History   Problem Relation Age of Onset   • Heart disease Mother    • Heart disease Father    • Cancer Father         Unspecified   • Heart disease Brother      Social History     Socioeconomic History   • Marital status:    Tobacco Use   • Smoking status: Former     Packs/day: 0.00     Years: 1.00     Pack years: 0.00     Types: Cigarettes     Quit date: 1991     Years since quittin.7   • Smokeless tobacco: Never   • Tobacco comments:     quit at age 30   Vaping Use   • Vaping Use: Never used   Substance and Sexual Activity   • Alcohol use: Yes     Comment: Rare   • Drug use: Yes     Types: Marijuana     Comment: Daily   • Sexual activity: Defer     Allergies   Allergen Reactions   • Adhesive Tape Rash     Current Facility-Administered Medications   Medication Dose Route Frequency Provider Last Rate Last Admin   • acetaminophen (TYLENOL) tablet 1,000 mg  1,000 mg Oral Q8H Anahi Vilchis PA-C   1,000 mg at 23 0550   • aluminum-magnesium hydroxide-simethicone (MAALOX MAX) 400-400-40 MG/5ML suspension 15 mL  15 mL Oral Q6H PRN Shekhar Au MD       • amoxicillin (AMOXIL) capsule 1,000 mg  1,000 mg Oral Q8H Shekhar Au MD   1,000 mg at 23 0550   • apixaban (ELIQUIS) tablet 5 mg  5 mg Oral Q12H Shekhar Au MD   5 mg at 23 0934   • atorvastatin (LIPITOR) tablet 10 mg  10 mg Oral Nightly Shekhar Au MD   10 mg at 23 2157   • sennosides-docusate (PERICOLACE) 8.6-50 MG per tablet 2 tablet  2 tablet Oral BID Shekhar Au MD   2 tablet at 23 0934    And   • polyethylene glycol (MIRALAX) packet 17 g  17 g Oral Daily PRN Shekhar Au MD        And   • bisacodyl (DULCOLAX) EC tablet 5 mg  5 mg Oral Daily PRN Shekhar Au MD        And   • bisacodyl (DULCOLAX) suppository 10 mg  10  mg Rectal Daily PRN Shekhar Au MD       • calcium carbonate (TUMS) chewable tablet 500 mg (200 mg elemental)  2 tablet Oral TID PRN Shekhar Au MD       • dextrose (D50W) (25 g/50 mL) IV injection 25 g  25 g Intravenous Q15 Min PRN Darrin Lamar MD       • dextrose (GLUTOSE) oral gel 15 g  15 g Oral Q15 Min PRN Darrin Lamar MD       • digoxin (LANOXIN) tablet 125 mcg  125 mcg Oral Daily Shekhar Au MD   125 mcg at 05/22/23 1136   • dilTIAZem CD (CARDIZEM CD) 24 hr capsule 120 mg  120 mg Oral Q24H Shekhar Au MD   120 mg at 05/22/23 0934   • famotidine (PEPCID) tablet 40 mg  40 mg Oral Daily Shekhar Au MD   40 mg at 05/22/23 0934   • glucagon (GLUCAGEN) injection 1 mg  1 mg Intramuscular Q15 Min PRN Darrin Lamar MD       • HYDROcodone-acetaminophen (NORCO) 5-325 MG per tablet 1 tablet  1 tablet Oral Q6H PRN Shekhar Au MD   1 tablet at 05/19/23 0423   • insulin detemir (LEVEMIR) injection 25 Units  25 Units Subcutaneous Q12H Cayla Mcdonald MD   25 Units at 05/22/23 0935   • Insulin Lispro (humaLOG) injection 2-7 Units  2-7 Units Subcutaneous 4x Daily With Meals & Nightly Anahi Vilchis PA-C   3 Units at 05/22/23 1136   • metoprolol succinate XL (TOPROL-XL) 24 hr tablet 50 mg  50 mg Oral Q12H Shekhar Au MD   50 mg at 05/22/23 0934   • metoprolol tartrate (LOPRESSOR) injection 5 mg  5 mg Intravenous Q4H PRN Shekhar Au MD       • midodrine (PROAMATINE) tablet 5 mg  5 mg Oral TID AC Shekhar Au MD   5 mg at 05/22/23 1114   • naloxone (NARCAN) injection 0.4 mg  0.4 mg Intravenous Q5 Min PRN Shekhar Au MD       • ondansetron (ZOFRAN) injection 4 mg  4 mg Intravenous Q6H PRN Darrin Lamar MD       • oxyCODONE (ROXICODONE) immediate release tablet 10 mg  10 mg Oral Q6H PRN Shekhar Au MD   10 mg at 05/22/23 1013   • pregabalin (LYRICA) capsule 25 mg  25 mg Oral Q8H Shekhar Au MD   25 mg at 05/22/23 0550   • sodium chloride 0.9 % flush 10 mL  10  mL Intravenous PRDarrin Pérez MD       • sodium chloride 0.9 % flush 10 mL  10 mL Intravenous PRDarrin Pérez MD       • sodium chloride 0.9 % flush 10 mL  10 mL Intravenous Q12H Darrin Lamar MD   10 mL at 05/21/23 0846   • sodium chloride 0.9 % infusion 40 mL  40 mL Intravenous Darrin Mendoza MD         Review of Systems   Constitutional: Negative.    Skin:        Ulcerations on bilateral feet.  Painful toenails on left foot.   All other systems reviewed and are negative.      OBJECTIVE     Vitals:    05/22/23 1149   BP: 128/57   Pulse: 63   Resp: 16   Temp: 97.9 °F (36.6 °C)   SpO2: 96%       PHYSICAL EXAM    GEN:   A&Ox3, NAD. Pt presents in hospital bed. Accompanied by his nurse.    Neurovascular status: Unchanged    Orthopedic exam:  Right metatarsal amputation    Derm:      Right foot: Stage 3 ulceration on the along the transmetatarsal amputation site area of the foot.  Nonviable tissue is present.  No surrounding, edema, erythema, lymphangitis, fluctuance, nor signs of infection.  Serous drainage present.  80 mm x 22 mm x 4 mm.  Does not probe to bone.  This area was debrided via excisional subcutaneous debridement with a 10 scalpel blade.  Post debridement measurements were 82 mm x 30 mm x 5 mm in depth.    Left foot: Stage 3 ulceration on the plantar heel area of the foot.  Nonviable tissue is present.  No surrounding, edema, erythema, lymphangitis, fluctuance, nor signs of infection.  Serous drainage present.  25 mm x 15 mm x 4 mm.  Does not probe to bone.  This area was debrided via excisional subcutaneous debridement with a 10 scalpel blade.  Post debridement measurements were 27 mm x 16 mm x 5 mm in depth.    Toenails 1 through 5 on the left foot are incurvated, elongated, thickened, yellow, chalky, and painful to palpation.  Is 1 through 5 on the left foot were debrided with toenail clippers.    LABORATORY/CULTURE RESULTS:  Results from last 7 days   Lab Units 05/22/23  0559   WBC 10*3/mm3 7.57    HEMOGLOBIN g/dL 11.3*   HEMATOCRIT % 37.0*   PLATELETS 10*3/mm3 257     Results from last 7 days   Lab Units 05/22/23  0559 05/18/23  0441 05/17/23  0455   SODIUM mmol/L 138 132* 134*   POTASSIUM mmol/L 4.3 4.7 4.9   CHLORIDE mmol/L 102 97* 100   CO2 mmol/L 30.8* 27.0 28.2   BUN mg/dL 10 11 12   CREATININE mg/dL 0.53* 0.59* 0.58*   CALCIUM mg/dL 8.6 8.1* 8.5*   BILIRUBIN mg/dL 0.7  --   --    ALK PHOS U/L 623*  --   --    ALT (SGPT) U/L 37  --   --    AST (SGOT) U/L 64*  --   --    GLUCOSE mg/dL 205* 298* 284*         Microbiology Results (last 10 days)     Procedure Component Value - Date/Time    Blood Culture - Blood, Hand, Right [[id number]]  (Normal) Collected: 05/13/23 1351    Lab Status: Final result Specimen: Blood from Hand, Right Updated: 05/18/23 1415     Blood Culture No growth at 5 days    Wound Culture - Wound, Foot, Right [[id number]]  (Abnormal) Collected: 05/13/23 1325    Lab Status: Final result Specimen: Wound from Foot, Right Updated: 05/15/23 0901     Wound Culture Rare Streptococcus agalactiae (Group B)     Comment:   This organism is considered to be universally susceptible to penicillin.  No further antibiotic testing will be performed. If Clindamycin or Erythromycin is the drug of choice, notify the laboratory within 7 days to request susceptibility testing.         Scant growth (1+) Normal Skin Berenice     Gram Stain Moderate (3+) WBCs seen      No organisms seen    MRSA Screen, PCR (Inpatient) - Swab, Nares [[id number]]  (Normal) Collected: 05/13/23 1320    Lab Status: Final result Specimen: Swab from Nares Updated: 05/13/23 1447     MRSA PCR No MRSA Detected    Narrative:      The negative predictive value of this diagnostic test is high and should only be used to consider de-escalating anti-MRSA therapy. A positive result may indicate colonization with MRSA and must be correlated clinically.    Blood Culture - Blood, Hand, Right [[id number]]  (Normal) Collected: 05/13/23 1054    Lab Status:  Final result Specimen: Blood from Hand, Right Updated: 05/18/23 1100     Blood Culture No growth at 5 days    Respiratory Culture - Sputum, Cough [[id number]] Collected: 05/13/23 0200    Lab Status: Final result Specimen: Sputum from Cough Updated: 05/15/23 1139     Respiratory Culture Moderate growth (3+) Normal respiratory jewels. No S. aureus or Pseudomonas aeruginosa detected. Final report.     Gram Stain Moderate (3+) Gram positive cocci in pairs      Few (2+) Budding yeast with hyphae seen      Few (2+) Gram positive bacilli    Narrative:            Urine Culture - Urine, Urine, Clean Catch [[id number]]  (Normal) Collected: 05/12/23 1454    Lab Status: Final result Specimen: Urine, Clean Catch Updated: 05/13/23 1809     Urine Culture No growth    S. Pneumo Ag Urine or CSF - Urine, Urine, Clean Catch [[id number]]  (Normal) Collected: 05/12/23 1454    Lab Status: Final result Specimen: Urine, Clean Catch Updated: 05/12/23 1939     Strep Pneumo Ag Negative    Legionella Antigen, Urine - Urine, Urine, Clean Catch [[id number]]  (Normal) Collected: 05/12/23 1454    Lab Status: Final result Specimen: Urine, Clean Catch Updated: 05/12/23 1938     LEGIONELLA ANTIGEN, URINE Negative    COVID-19,APTIMA PANTHER(LUDIN),[facility], NP/OP SWAB IN UTM/VTM/SALINE TRANSPORT MEDIA,24 HR TAT - Swab, Nasal Cavity [[id number]]  (Normal) Collected: 05/12/23 1449    Lab Status: Final result Specimen: Swab from Nasal Cavity Updated: 05/12/23 1917     COVID19 Not Detected    Narrative:      Fact sheet for providers: https://www.fda.gov/media/096310/download     Fact sheet for patients: https://www.fda.gov/media/373477/download    Test performed by RT PCR.    Influenza Antigen, Rapid - Swab, Nasopharynx [[id number]]  (Normal) Collected: 05/12/23 1449    Lab Status: Final result Specimen: Swab from Nasopharynx Updated: 05/12/23 1518     Influenza A Ag, EIA Negative     Influenza B Ag, EIA Negative    Blood Culture - Blood, Arm, Left  [[id number]]  (Abnormal) Collected: 05/12/23 1447    Lab Status: Final result Specimen: Blood from Arm, Left Updated: 05/14/23 0713     Blood Culture Streptococcus agalactiae (Group B)     Isolated from Aerobic and Anaerobic Bottles     Gram Stain Aerobic Bottle Gram positive cocci in chains      Anaerobic Bottle Gram positive cocci in chains    Narrative:      Refer to previous blood culture collected on 5/12 for karan's      Blood Culture - Blood, Arm, Right [[id number]]  (Abnormal)  (Susceptibility) Collected: 05/12/23 1447    Lab Status: Final result Specimen: Blood from Arm, Right Updated: 05/14/23 0712     Blood Culture Streptococcus agalactiae (Group B)     Isolated from --     Gram Stain Aerobic Bottle Gram positive cocci in chains      Anaerobic Bottle Gram positive cocci in chains    Susceptibility      Streptococcus agalactiae (Group B)      KARAN      Ceftriaxone Susceptible      Levofloxacin Susceptible      Penicillin G Susceptible      Vancomycin Susceptible                           Blood Culture ID, PCR - Blood, Arm, Right [[id number]]  (Abnormal) Collected: 05/12/23 1447    Lab Status: Final result Specimen: Blood from Arm, Right Updated: 05/13/23 0716     BCID, PCR Streptococcus agalactiae (Group B). Identification by BCID2 PCR.           ASSESSMENT/PLAN     Active Hospital Problems:  Active Hospital Problems    Diagnosis    • **Sepsis    • Onychomycosis    • Foot pain, left    • Right foot pain    • Osteomyelitis of foot, right, acute    • Wound of foot    • Diabetic ulcer of right midfoot associated with type 2 DM        Comprehensive lower extremity examination and evaluation was performed.    Discussed findings and treatment plan including risks, benefits, and treatment options with patient in detail. Patient agreed with treatment plan.    Toenails 1, 2, 3, 4, 5 on Left were debrided with nail nippers.  Patient tolerated procedure well without complications.    Continue current wound care regimen to  both feet.    Wound care nurse consult pending.    Discussed with patient's nurse.    Thank you for including me in the care of this patient.    Lab Frequency Next Occurrence       This document has been electronically signed by Ash Leyva DPM on May 22, 2023 12:59 EDT

## 2023-05-14 LAB
ALBUMIN SERPL-MCNC: 2.2 G/DL (ref 3.5–5.2)
ANION GAP SERPL CALCULATED.3IONS-SCNC: 8.2 MMOL/L (ref 5–15)
BACTERIA SPEC AEROBE CULT: ABNORMAL
BACTERIA SPEC AEROBE CULT: ABNORMAL
BASOPHILS # BLD AUTO: 0.05 10*3/MM3 (ref 0–0.2)
BASOPHILS NFR BLD AUTO: 0.4 % (ref 0–1.5)
BUN SERPL-MCNC: 18 MG/DL (ref 8–23)
BUN/CREAT SERPL: 25 (ref 7–25)
CALCIUM SPEC-SCNC: 7.9 MG/DL (ref 8.6–10.5)
CHLORIDE SERPL-SCNC: 95 MMOL/L (ref 98–107)
CO2 SERPL-SCNC: 23.8 MMOL/L (ref 22–29)
CREAT SERPL-MCNC: 0.72 MG/DL (ref 0.76–1.27)
DEPRECATED RDW RBC AUTO: 46.4 FL (ref 37–54)
EGFRCR SERPLBLD CKD-EPI 2021: 102 ML/MIN/1.73
EOSINOPHIL # BLD AUTO: 0.14 10*3/MM3 (ref 0–0.4)
EOSINOPHIL NFR BLD AUTO: 1.3 % (ref 0.3–6.2)
ERYTHROCYTE [DISTWIDTH] IN BLOOD BY AUTOMATED COUNT: 16.4 % (ref 12.3–15.4)
GLUCOSE BLDC GLUCOMTR-MCNC: 209 MG/DL (ref 70–99)
GLUCOSE BLDC GLUCOMTR-MCNC: 298 MG/DL (ref 70–99)
GLUCOSE BLDC GLUCOMTR-MCNC: 340 MG/DL (ref 70–99)
GLUCOSE BLDC GLUCOMTR-MCNC: 393 MG/DL (ref 70–99)
GLUCOSE SERPL-MCNC: 215 MG/DL (ref 65–99)
GRAM STN SPEC: ABNORMAL
HCT VFR BLD AUTO: 33.1 % (ref 37.5–51)
HGB BLD-MCNC: 10.4 G/DL (ref 13–17.7)
IMM GRANULOCYTES # BLD AUTO: 0.18 10*3/MM3 (ref 0–0.05)
IMM GRANULOCYTES NFR BLD AUTO: 1.6 % (ref 0–0.5)
ISOLATED FROM: ABNORMAL
ISOLATED FROM: ABNORMAL
LYMPHOCYTES # BLD AUTO: 1.06 10*3/MM3 (ref 0.7–3.1)
LYMPHOCYTES NFR BLD AUTO: 9.5 % (ref 19.6–45.3)
MCH RBC QN AUTO: 24.6 PG (ref 26.6–33)
MCHC RBC AUTO-ENTMCNC: 31.4 G/DL (ref 31.5–35.7)
MCV RBC AUTO: 78.4 FL (ref 79–97)
MONOCYTES # BLD AUTO: 0.91 10*3/MM3 (ref 0.1–0.9)
MONOCYTES NFR BLD AUTO: 8.2 % (ref 5–12)
NEUTROPHILS NFR BLD AUTO: 79 % (ref 42.7–76)
NEUTROPHILS NFR BLD AUTO: 8.8 10*3/MM3 (ref 1.7–7)
NRBC BLD AUTO-RTO: 0 /100 WBC (ref 0–0.2)
OSMOLALITY SERPL: 285 MOSM/KG (ref 280–301)
OSMOLALITY UR: 647 MOSM/KG (ref 50–1400)
PHOSPHATE SERPL-MCNC: 1.7 MG/DL (ref 2.5–4.5)
PLATELET # BLD AUTO: 168 10*3/MM3 (ref 140–450)
PMV BLD AUTO: 10.9 FL (ref 6–12)
POTASSIUM SERPL-SCNC: 4.1 MMOL/L (ref 3.5–5.2)
RBC # BLD AUTO: 4.22 10*6/MM3 (ref 4.14–5.8)
SODIUM SERPL-SCNC: 127 MMOL/L (ref 136–145)
WBC NRBC COR # BLD: 11.14 10*3/MM3 (ref 3.4–10.8)

## 2023-05-14 PROCEDURE — 84300 ASSAY OF URINE SODIUM: CPT | Performed by: INTERNAL MEDICINE

## 2023-05-14 PROCEDURE — 82948 REAGENT STRIP/BLOOD GLUCOSE: CPT

## 2023-05-14 PROCEDURE — 25010000002 PIPERACILLIN SOD-TAZOBACTAM PER 1 G: Performed by: INTERNAL MEDICINE

## 2023-05-14 PROCEDURE — 85025 COMPLETE CBC W/AUTO DIFF WBC: CPT | Performed by: INTERNAL MEDICINE

## 2023-05-14 PROCEDURE — 25010000002 LINEZOLID 600 MG/300ML SOLUTION: Performed by: INTERNAL MEDICINE

## 2023-05-14 PROCEDURE — 83935 ASSAY OF URINE OSMOLALITY: CPT | Performed by: INTERNAL MEDICINE

## 2023-05-14 PROCEDURE — 63710000001 INSULIN LISPRO (HUMAN) PER 5 UNITS

## 2023-05-14 PROCEDURE — 80069 RENAL FUNCTION PANEL: CPT | Performed by: INTERNAL MEDICINE

## 2023-05-14 PROCEDURE — 99233 SBSQ HOSP IP/OBS HIGH 50: CPT | Performed by: INTERNAL MEDICINE

## 2023-05-14 PROCEDURE — 0 CEFTRIAXONE PER 250 MG: Performed by: INTERNAL MEDICINE

## 2023-05-14 PROCEDURE — 63710000001 INSULIN DETEMIR PER 5 UNITS: Performed by: INTERNAL MEDICINE

## 2023-05-14 PROCEDURE — 63710000001 INSULIN LISPRO (HUMAN) PER 5 UNITS: Performed by: INTERNAL MEDICINE

## 2023-05-14 PROCEDURE — 83930 ASSAY OF BLOOD OSMOLALITY: CPT | Performed by: INTERNAL MEDICINE

## 2023-05-14 PROCEDURE — 94799 UNLISTED PULMONARY SVC/PX: CPT

## 2023-05-14 PROCEDURE — 25010000002 MORPHINE PER 10 MG: Performed by: INTERNAL MEDICINE

## 2023-05-14 RX ORDER — CEFTRIAXONE SODIUM 2 G/50ML
2 INJECTION, SOLUTION INTRAVENOUS EVERY 24 HOURS
Status: DISCONTINUED | OUTPATIENT
Start: 2023-05-14 | End: 2023-05-17

## 2023-05-14 RX ORDER — SODIUM PHOSPHATE IN D5W 15MMOL/250
15 PLASTIC BAG, INJECTION (ML) INTRAVENOUS ONCE
Status: COMPLETED | OUTPATIENT
Start: 2023-05-14 | End: 2023-05-14

## 2023-05-14 RX ORDER — INSULIN LISPRO 100 [IU]/ML
2-7 INJECTION, SOLUTION INTRAVENOUS; SUBCUTANEOUS
Status: DISCONTINUED | OUTPATIENT
Start: 2023-05-14 | End: 2023-06-15 | Stop reason: HOSPADM

## 2023-05-14 RX ORDER — MIDODRINE HYDROCHLORIDE 5 MG/1
5 TABLET ORAL
Status: DISCONTINUED | OUTPATIENT
Start: 2023-05-14 | End: 2023-05-15

## 2023-05-14 RX ORDER — SODIUM PHOSPHATE IN D5W 15MMOL/250
15 PLASTIC BAG, INJECTION (ML) INTRAVENOUS EVERY 4 HOURS
Status: COMPLETED | OUTPATIENT
Start: 2023-05-14 | End: 2023-05-14

## 2023-05-14 RX ADMIN — INSULIN LISPRO 4 UNITS: 100 INJECTION, SOLUTION INTRAVENOUS; SUBCUTANEOUS at 13:23

## 2023-05-14 RX ADMIN — INSULIN LISPRO 3 UNITS: 100 INJECTION, SOLUTION INTRAVENOUS; SUBCUTANEOUS at 08:08

## 2023-05-14 RX ADMIN — APIXABAN 5 MG: 5 TABLET, FILM COATED ORAL at 08:09

## 2023-05-14 RX ADMIN — INSULIN DETEMIR 15 UNITS: 100 INJECTION, SOLUTION SUBCUTANEOUS at 21:43

## 2023-05-14 RX ADMIN — PREGABALIN 25 MG: 25 CAPSULE ORAL at 13:24

## 2023-05-14 RX ADMIN — APIXABAN 5 MG: 5 TABLET, FILM COATED ORAL at 21:43

## 2023-05-14 RX ADMIN — KETOTIFEN FUMARATE 1 DROP: 0.25 SOLUTION OPHTHALMIC at 21:44

## 2023-05-14 RX ADMIN — INSULIN DETEMIR 15 UNITS: 100 INJECTION, SOLUTION SUBCUTANEOUS at 08:08

## 2023-05-14 RX ADMIN — MORPHINE SULFATE 1 MG: 2 INJECTION, SOLUTION INTRAMUSCULAR; INTRAVENOUS at 13:38

## 2023-05-14 RX ADMIN — SODIUM PHOSPHATE, MONOBASIC, MONOHYDRATE 15 MMOL: 276; 142 INJECTION, SOLUTION INTRAVENOUS at 16:52

## 2023-05-14 RX ADMIN — MORPHINE SULFATE 1 MG: 2 INJECTION, SOLUTION INTRAMUSCULAR; INTRAVENOUS at 19:33

## 2023-05-14 RX ADMIN — LINEZOLID 600 MG: 600 INJECTION, SOLUTION INTRAVENOUS at 08:10

## 2023-05-14 RX ADMIN — MIDODRINE HYDROCHLORIDE 5 MG: 5 TABLET ORAL at 13:38

## 2023-05-14 RX ADMIN — PREGABALIN 25 MG: 25 CAPSULE ORAL at 21:43

## 2023-05-14 RX ADMIN — INSULIN LISPRO 5 UNITS: 100 INJECTION, SOLUTION INTRAVENOUS; SUBCUTANEOUS at 17:02

## 2023-05-14 RX ADMIN — MORPHINE SULFATE 1 MG: 2 INJECTION, SOLUTION INTRAMUSCULAR; INTRAVENOUS at 08:09

## 2023-05-14 RX ADMIN — DILTIAZEM HYDROCHLORIDE 30 MG: 30 TABLET, FILM COATED ORAL at 05:03

## 2023-05-14 RX ADMIN — Medication 10 ML: at 21:45

## 2023-05-14 RX ADMIN — INSULIN LISPRO 6 UNITS: 100 INJECTION, SOLUTION INTRAVENOUS; SUBCUTANEOUS at 23:03

## 2023-05-14 RX ADMIN — DILTIAZEM HYDROCHLORIDE 30 MG: 30 TABLET, FILM COATED ORAL at 13:24

## 2023-05-14 RX ADMIN — ATORVASTATIN CALCIUM 10 MG: 10 TABLET, FILM COATED ORAL at 21:43

## 2023-05-14 RX ADMIN — FAMOTIDINE 40 MG: 20 TABLET ORAL at 08:09

## 2023-05-14 RX ADMIN — METOPROLOL SUCCINATE 50 MG: 50 TABLET, EXTENDED RELEASE ORAL at 08:09

## 2023-05-14 RX ADMIN — PREGABALIN 25 MG: 25 CAPSULE ORAL at 05:03

## 2023-05-14 RX ADMIN — SODIUM PHOSPHATE, MONOBASIC, MONOHYDRATE 15 MMOL: 276; 142 INJECTION, SOLUTION INTRAVENOUS at 13:24

## 2023-05-14 RX ADMIN — PIPERACILLIN SODIUM AND TAZOBACTAM SODIUM 4.5 G: 4; .5 INJECTION, POWDER, LYOPHILIZED, FOR SOLUTION INTRAVENOUS at 09:51

## 2023-05-14 RX ADMIN — DILTIAZEM HYDROCHLORIDE 30 MG: 30 TABLET, FILM COATED ORAL at 17:02

## 2023-05-14 RX ADMIN — SODIUM PHOSPHATE, MONOBASIC, MONOHYDRATE 15 MMOL: 276; 142 INJECTION, SOLUTION INTRAVENOUS at 06:32

## 2023-05-14 RX ADMIN — MORPHINE SULFATE 1 MG: 2 INJECTION, SOLUTION INTRAMUSCULAR; INTRAVENOUS at 03:42

## 2023-05-14 RX ADMIN — PIPERACILLIN SODIUM AND TAZOBACTAM SODIUM 4.5 G: 4; .5 INJECTION, POWDER, LYOPHILIZED, FOR SOLUTION INTRAVENOUS at 01:20

## 2023-05-14 RX ADMIN — CEFTRIAXONE SODIUM 2 G: 2 INJECTION, SOLUTION INTRAVENOUS at 13:24

## 2023-05-14 RX ADMIN — MIDODRINE HYDROCHLORIDE 5 MG: 5 TABLET ORAL at 17:02

## 2023-05-14 NOTE — PROGRESS NOTES
[facility]   Hospitalist Progress Note  Date: 2023  Patient Name: Jose Shaikh  : 1960  MRN: [id number]  Date of admission: 2023      Subjective   Subjective     Chief Complaint: Left hip pain    Summary:   Jose Shaikh is a 62 y.o. male past medical history of osteomyelitis, type 2 diabetes, hypertension, A-fib, dyslipidemia, obesity with BMI of 48, who was recently hospital for osteomyelitis and he returns due to hip pain.        Patient was recently admitted to the hospital in early April for foot infection.  There was recommendation for amputation due to osteomyelitis in the foot and the patient refused.  He was unable to be placed at that time.  He is a sex offender making it very difficult for placement although [facility] was an option.  As result, he opted to go home on oral antibiotics to treat Alcaligenes facialis and Morganella morganii with doxycycline and ciprofloxacin.  The patient was seen in wound care in middle of April and at that time Dr. Daniels attempted to switch antibiotics to Levaquin per pharmacy recommendations based off culture data but was unable to get hold the patient so he was never switched.  The patient states the only reason he came to the emergency department was because his left hip was hurting.  Unaware of fevers.  Chills.  Cough.  Shortness of breath.  The patient came to the ER for hip pain.        In the emergency department the patient's vital signs are as follows temperature is 103.4, pulse is 139, blood pressure is 92/65, 95% on room air.  CBC shows a white blood cell count of 20.55 with neutrophils of 88.2.  CRP is 20.53, which is up from 7.4 in April and lactate is 2.6.  Sed rate was 42 in April and is currently 75.  Bicarb is 19.9 and sodium is 130.  Urinalysis shows positive nitrite and small leuk esterase but no bacteria.  Patient also seems to have a pneumonia in his right upper lobe.  X-ray of the right foot shows evidence of  osteomyelitis involving the distal aspect of the cuboid and gas in the soft tissue along the plantar aspect of the foot just proximal amputation site and periostitis involving the cuneiform.  Patient got 1 L of normal saline.  Patient received Zosyn and vancomycin for antibiotics.  Podiatry was consulted and did not feel like the foot was the source.  Patient will be admitted to the hospital for severe sepsis with unclear source at this time.  The source could be pneumonia, urinary tract infection or most likely the foot.  Patient will also be managed with A-fib with RVR.  Patient blood culture grew Streptococcus agalactiae.  Zyvox DC'd.  Patient does not want any amputation.    Interval Followup:   A-fib rate controlled, off cardizem drip.  bp soft  No fever.  Remains on room air with 91% saturation  Denies any palpitations or chest pain.  Itching of the eyes better  Does have cough with mucus.  And it makes his hip hurt  Complaining of pain in left hip more than right and asking for pain medications.  Patient has bad arthritis and oral pain medication does not help much.  IV morphine helps most    Review of Systems   All systems were reviewed and negative except for: Summary and interval follow-up    Objective   Objective     Vitals:   Temp:  [97.7 °F (36.5 °C)-99.2 °F (37.3 °C)] 98.2 °F (36.8 °C)  Heart Rate:  [74-87] 81  Resp:  [17-20] 18  BP: ()/(52-65) 112/52  Physical Exam      Constitutional: Awake, alert, no acute distress              Eyes: Pupils equal, sclerae anicteric, no conjunctival injection              HENT: NCAT, mucous membranes moist              Neck: Supple, no thyromegaly, no lymphadenopathy, trachea midline              Respiratory coarse breath sounds.  Body habitus makes it difficult to get a good lung exam              Cardiovascular: Irregularly irregular, no murmurs, rubs, or gallops, palpable pedal pulses bilaterally              Gastrointestinal: Positive bowel sounds, soft,  nontender, nondistended              Musculoskeletal: +1 bilateral ankle edema, no clubbing or cyanosis to extremities              Psychiatric: Appropriate affect, cooperative              Neurologic: Oriented x 3, strength symmetric in all extremities, Cranial Nerves grossly intact to confrontation, speech clear              Skin: No rashes.  Right foot shows amputation transmetatarsal.  There is a dehisced wound there is area of warmth and redness.  Right foot is  dressed.       Result Review    Result Review:  I have personally reviewed the results for the past 24 hours and agree with these findings:  [x]  Laboratory  [x]  Microbiology  [x]  Radiology  [x]  EKG/Telemetry A-fib 140  []  Cardiology/Vascular   []  Pathology  [x]  Old records  [x]  Other: Medications    Assessment & Plan   Assessment / Plan     Assessment:  Sepsis present on admission.  Patient has fever, tachycardia, lactic acidosis, elevated procalcitonin and leukocytosis.  Improving  Right upper lobe healthcare associated pneumonia .  Streptococcus agalactiae bacteremia  Chronic osteomyelitis of right foot involving cuboid and may be cuneiform.  Paroxysmal A-fib with intermittent RVR on Eliquis.  Bilateral diabetic foot ulcers.  NIDDM.  Morbid obesity BMI of 48.1.  S/p TMA of right foot.  Hyponatremia.  Likely from hyperglycemia/volume overload.  Clinically significant.  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    Plan:  Fluid restriction for hyponatremia  Check urine sodium.  Antibiotics changed to IV Rocephin  Check wound culture.  Await final culture data  Appreciate podiatry input.  Per podiatry right foot wound not source of infection.  Patient does not want below-knee amputation.  Midodrine  S/p IV dig x1.  P.o. Cardizem.  IV Cardizem DC'd  Resumed home metoprolol.  IV and oral narcotics for pain control.  As needed Toradol started on Lyrica.  Allergy eyedrops for 2 days   MRSA PCR.,  Strep pneumonia and Legionella  antigen negative  Sliding-scale insulin and carb consistent diet.  Wound nurse to evaluate patient.  PT OT.  Continue telemetry    Discussed plan with RN.  Return home when stable    DVT prophylaxis:  Medical DVT prophylaxis orders are present.  Home Eliquis    CODE STATUS:   Level Of Support Discussed With: Patient  Code Status (Patient has no pulse and is not breathing): CPR (Attempt to Resuscitate)  Medical Interventions (Patient has pulse or is breathing): Full Support      Part of this note may be an electronic transcription/translation of spoken language to printed text using the Dragon Dictation System.       Electronically signed by Shekhar Au MD, 05/14/23, 4:51 PM EDT.

## 2023-05-14 NOTE — PLAN OF CARE
Goal Outcome Evaluation:     Patient is alert and oriented x 4, still complaining of left hip pain, pain medication given as  needed, cardizem drip on hold, bp and heart rate is maintaining within normal range.transferred to bariatric bed,wound dressing changed as needed.no pertinent change in condition, to continue care plan.

## 2023-05-14 NOTE — PLAN OF CARE
Goal Outcome Evaluation:      Pt complained of pain throughout shift. See mar. VSS. Will continue with plan of care.

## 2023-05-15 LAB
ALBUMIN SERPL-MCNC: 2.3 G/DL (ref 3.5–5.2)
ANION GAP SERPL CALCULATED.3IONS-SCNC: 6.8 MMOL/L (ref 5–15)
BACTERIA SPEC AEROBE CULT: ABNORMAL
BACTERIA SPEC AEROBE CULT: ABNORMAL
BACTERIA SPEC RESP CULT: NORMAL
BASOPHILS # BLD AUTO: 0.05 10*3/MM3 (ref 0–0.2)
BASOPHILS NFR BLD AUTO: 0.5 % (ref 0–1.5)
BUN SERPL-MCNC: 12 MG/DL (ref 8–23)
BUN/CREAT SERPL: 20 (ref 7–25)
CALCIUM SPEC-SCNC: 7.9 MG/DL (ref 8.6–10.5)
CHLORIDE SERPL-SCNC: 100 MMOL/L (ref 98–107)
CO2 SERPL-SCNC: 23.2 MMOL/L (ref 22–29)
CREAT SERPL-MCNC: 0.6 MG/DL (ref 0.76–1.27)
DEPRECATED RDW RBC AUTO: 45.1 FL (ref 37–54)
EGFRCR SERPLBLD CKD-EPI 2021: 107.8 ML/MIN/1.73
EOSINOPHIL # BLD AUTO: 0.11 10*3/MM3 (ref 0–0.4)
EOSINOPHIL NFR BLD AUTO: 1.1 % (ref 0.3–6.2)
ERYTHROCYTE [DISTWIDTH] IN BLOOD BY AUTOMATED COUNT: 16.1 % (ref 12.3–15.4)
GLUCOSE BLDC GLUCOMTR-MCNC: 202 MG/DL (ref 70–99)
GLUCOSE BLDC GLUCOMTR-MCNC: 247 MG/DL (ref 70–99)
GLUCOSE BLDC GLUCOMTR-MCNC: 254 MG/DL (ref 70–99)
GLUCOSE BLDC GLUCOMTR-MCNC: 284 MG/DL (ref 70–99)
GLUCOSE BLDC GLUCOMTR-MCNC: 310 MG/DL (ref 70–99)
GLUCOSE BLDC GLUCOMTR-MCNC: 318 MG/DL (ref 70–99)
GLUCOSE SERPL-MCNC: 207 MG/DL (ref 65–99)
GRAM STN SPEC: ABNORMAL
GRAM STN SPEC: ABNORMAL
GRAM STN SPEC: NORMAL
HCT VFR BLD AUTO: 32 % (ref 37.5–51)
HGB BLD-MCNC: 10.4 G/DL (ref 13–17.7)
IMM GRANULOCYTES # BLD AUTO: 0.19 10*3/MM3 (ref 0–0.05)
IMM GRANULOCYTES NFR BLD AUTO: 2 % (ref 0–0.5)
LYMPHOCYTES # BLD AUTO: 1.08 10*3/MM3 (ref 0.7–3.1)
LYMPHOCYTES NFR BLD AUTO: 11.2 % (ref 19.6–45.3)
MAGNESIUM SERPL-MCNC: 1.9 MG/DL (ref 1.6–2.4)
MCH RBC QN AUTO: 25.2 PG (ref 26.6–33)
MCHC RBC AUTO-ENTMCNC: 32.5 G/DL (ref 31.5–35.7)
MCV RBC AUTO: 77.5 FL (ref 79–97)
MONOCYTES # BLD AUTO: 0.77 10*3/MM3 (ref 0.1–0.9)
MONOCYTES NFR BLD AUTO: 8 % (ref 5–12)
NEUTROPHILS NFR BLD AUTO: 7.47 10*3/MM3 (ref 1.7–7)
NEUTROPHILS NFR BLD AUTO: 77.2 % (ref 42.7–76)
NRBC BLD AUTO-RTO: 0 /100 WBC (ref 0–0.2)
PHOSPHATE SERPL-MCNC: 2.3 MG/DL (ref 2.5–4.5)
PLATELET # BLD AUTO: 174 10*3/MM3 (ref 140–450)
PMV BLD AUTO: 10.9 FL (ref 6–12)
POTASSIUM SERPL-SCNC: 4.2 MMOL/L (ref 3.5–5.2)
PROCALCITONIN SERPL-MCNC: 0.49 NG/ML (ref 0–0.25)
RBC # BLD AUTO: 4.13 10*6/MM3 (ref 4.14–5.8)
SODIUM SERPL-SCNC: 130 MMOL/L (ref 136–145)
SODIUM UR-SCNC: <20 MMOL/L
WBC NRBC COR # BLD: 9.67 10*3/MM3 (ref 3.4–10.8)

## 2023-05-15 PROCEDURE — 94799 UNLISTED PULMONARY SVC/PX: CPT

## 2023-05-15 PROCEDURE — 63710000001 INSULIN DETEMIR PER 5 UNITS: Performed by: INTERNAL MEDICINE

## 2023-05-15 PROCEDURE — 25010000002 MORPHINE PER 10 MG: Performed by: INTERNAL MEDICINE

## 2023-05-15 PROCEDURE — 25010000002 DIGOXIN PER 500 MCG: Performed by: INTERNAL MEDICINE

## 2023-05-15 PROCEDURE — 63710000001 INSULIN LISPRO (HUMAN) PER 5 UNITS

## 2023-05-15 PROCEDURE — 97165 OT EVAL LOW COMPLEX 30 MIN: CPT

## 2023-05-15 PROCEDURE — 85025 COMPLETE CBC W/AUTO DIFF WBC: CPT | Performed by: INTERNAL MEDICINE

## 2023-05-15 PROCEDURE — 80069 RENAL FUNCTION PANEL: CPT | Performed by: INTERNAL MEDICINE

## 2023-05-15 PROCEDURE — 0 CEFTRIAXONE PER 250 MG: Performed by: INTERNAL MEDICINE

## 2023-05-15 PROCEDURE — 99233 SBSQ HOSP IP/OBS HIGH 50: CPT | Performed by: INTERNAL MEDICINE

## 2023-05-15 PROCEDURE — 84145 PROCALCITONIN (PCT): CPT | Performed by: INTERNAL MEDICINE

## 2023-05-15 PROCEDURE — 82948 REAGENT STRIP/BLOOD GLUCOSE: CPT

## 2023-05-15 PROCEDURE — 83735 ASSAY OF MAGNESIUM: CPT | Performed by: INTERNAL MEDICINE

## 2023-05-15 RX ORDER — METOPROLOL SUCCINATE 50 MG/1
50 TABLET, EXTENDED RELEASE ORAL EVERY 12 HOURS SCHEDULED
Status: DISCONTINUED | OUTPATIENT
Start: 2023-05-15 | End: 2023-06-06

## 2023-05-15 RX ORDER — SODIUM PHOSPHATE IN D5W 15MMOL/250
15 PLASTIC BAG, INJECTION (ML) INTRAVENOUS ONCE
Status: COMPLETED | OUTPATIENT
Start: 2023-05-15 | End: 2023-05-15

## 2023-05-15 RX ORDER — INSULIN LISPRO 100 [IU]/ML
5 INJECTION, SOLUTION INTRAVENOUS; SUBCUTANEOUS ONCE
Status: COMPLETED | OUTPATIENT
Start: 2023-05-15 | End: 2023-05-15

## 2023-05-15 RX ORDER — NALOXONE HCL 0.4 MG/ML
0.4 VIAL (ML) INJECTION
Status: DISCONTINUED | OUTPATIENT
Start: 2023-05-15 | End: 2023-06-15 | Stop reason: HOSPADM

## 2023-05-15 RX ORDER — MORPHINE SULFATE 2 MG/ML
2 INJECTION, SOLUTION INTRAMUSCULAR; INTRAVENOUS EVERY 4 HOURS PRN
Status: DISPENSED | OUTPATIENT
Start: 2023-05-15 | End: 2023-05-20

## 2023-05-15 RX ORDER — DIGOXIN 0.25 MG/ML
250 INJECTION INTRAMUSCULAR; INTRAVENOUS ONCE
Status: COMPLETED | OUTPATIENT
Start: 2023-05-15 | End: 2023-05-15

## 2023-05-15 RX ORDER — DILTIAZEM HYDROCHLORIDE 120 MG/1
120 CAPSULE, COATED, EXTENDED RELEASE ORAL
Status: DISCONTINUED | OUTPATIENT
Start: 2023-05-15 | End: 2023-05-16

## 2023-05-15 RX ADMIN — CEFTRIAXONE SODIUM 2 G: 2 INJECTION, SOLUTION INTRAVENOUS at 15:02

## 2023-05-15 RX ADMIN — MORPHINE SULFATE 2 MG: 2 INJECTION, SOLUTION INTRAMUSCULAR; INTRAVENOUS at 18:36

## 2023-05-15 RX ADMIN — DILTIAZEM HYDROCHLORIDE 30 MG: 30 TABLET, FILM COATED ORAL at 00:15

## 2023-05-15 RX ADMIN — PREGABALIN 25 MG: 25 CAPSULE ORAL at 22:24

## 2023-05-15 RX ADMIN — ATORVASTATIN CALCIUM 10 MG: 10 TABLET, FILM COATED ORAL at 22:17

## 2023-05-15 RX ADMIN — MORPHINE SULFATE 1 MG: 2 INJECTION, SOLUTION INTRAMUSCULAR; INTRAVENOUS at 05:08

## 2023-05-15 RX ADMIN — APIXABAN 5 MG: 5 TABLET, FILM COATED ORAL at 22:24

## 2023-05-15 RX ADMIN — SODIUM PHOSPHATE, MONOBASIC, MONOHYDRATE 15 MMOL: 276; 142 INJECTION, SOLUTION INTRAVENOUS at 13:06

## 2023-05-15 RX ADMIN — INSULIN LISPRO 4 UNITS: 100 INJECTION, SOLUTION INTRAVENOUS; SUBCUTANEOUS at 18:36

## 2023-05-15 RX ADMIN — PREGABALIN 25 MG: 25 CAPSULE ORAL at 14:40

## 2023-05-15 RX ADMIN — INSULIN LISPRO 3 UNITS: 100 INJECTION, SOLUTION INTRAVENOUS; SUBCUTANEOUS at 08:50

## 2023-05-15 RX ADMIN — DIGOXIN 250 MCG: 0.25 INJECTION INTRAMUSCULAR; INTRAVENOUS at 18:36

## 2023-05-15 RX ADMIN — ACETAMINOPHEN 1000 MG: 325 TABLET ORAL at 22:23

## 2023-05-15 RX ADMIN — MORPHINE SULFATE 1 MG: 2 INJECTION, SOLUTION INTRAMUSCULAR; INTRAVENOUS at 00:15

## 2023-05-15 RX ADMIN — ACETAMINOPHEN 1000 MG: 325 TABLET ORAL at 14:40

## 2023-05-15 RX ADMIN — INSULIN DETEMIR 15 UNITS: 100 INJECTION, SOLUTION SUBCUTANEOUS at 23:03

## 2023-05-15 RX ADMIN — Medication 10 ML: at 08:51

## 2023-05-15 RX ADMIN — DILTIAZEM HYDROCHLORIDE 30 MG: 30 TABLET, FILM COATED ORAL at 05:08

## 2023-05-15 RX ADMIN — MORPHINE SULFATE 2 MG: 2 INJECTION, SOLUTION INTRAMUSCULAR; INTRAVENOUS at 23:03

## 2023-05-15 RX ADMIN — MORPHINE SULFATE 1 MG: 2 INJECTION, SOLUTION INTRAMUSCULAR; INTRAVENOUS at 14:39

## 2023-05-15 RX ADMIN — INSULIN LISPRO 4 UNITS: 100 INJECTION, SOLUTION INTRAVENOUS; SUBCUTANEOUS at 23:03

## 2023-05-15 RX ADMIN — DILTIAZEM HYDROCHLORIDE 120 MG: 120 CAPSULE, COATED, EXTENDED RELEASE ORAL at 12:09

## 2023-05-15 RX ADMIN — APIXABAN 5 MG: 5 TABLET, FILM COATED ORAL at 08:51

## 2023-05-15 RX ADMIN — FAMOTIDINE 40 MG: 20 TABLET ORAL at 08:52

## 2023-05-15 RX ADMIN — Medication 10 ML: at 22:18

## 2023-05-15 RX ADMIN — INSULIN DETEMIR 15 UNITS: 100 INJECTION, SOLUTION SUBCUTANEOUS at 08:51

## 2023-05-15 RX ADMIN — METOPROLOL SUCCINATE 50 MG: 50 TABLET, EXTENDED RELEASE ORAL at 22:30

## 2023-05-15 RX ADMIN — MORPHINE SULFATE 1 MG: 2 INJECTION, SOLUTION INTRAMUSCULAR; INTRAVENOUS at 09:13

## 2023-05-15 RX ADMIN — PREGABALIN 25 MG: 25 CAPSULE ORAL at 05:08

## 2023-05-15 RX ADMIN — METOPROLOL SUCCINATE 50 MG: 50 TABLET, EXTENDED RELEASE ORAL at 08:51

## 2023-05-15 RX ADMIN — INSULIN LISPRO 3 UNITS: 100 INJECTION, SOLUTION INTRAVENOUS; SUBCUTANEOUS at 12:09

## 2023-05-15 RX ADMIN — INSULIN LISPRO 5 UNITS: 100 INJECTION, SOLUTION INTRAVENOUS; SUBCUTANEOUS at 00:47

## 2023-05-15 RX ADMIN — MIDODRINE HYDROCHLORIDE 5 MG: 5 TABLET ORAL at 08:51

## 2023-05-15 NOTE — CONSULTS
Consult for Advance Directive information. SW met with pt at the bedside and provided pt with AD booklet. Pt expressed interest and understanding of educational booklet. Pt asked SW to leave booklet at the bedside and states he would review it later. Pt states he wants to take a nap right now.

## 2023-05-15 NOTE — PROGRESS NOTES
[facility]   Hospitalist Progress Note  Date: 5/15/2023  Patient Name: Jose Shaikh  : 1960  MRN: [id number]  Date of admission: 2023      Subjective   Subjective     Chief Complaint: Left hip pain    Summary:   Jose Shaikh is a 62 y.o. male past medical history of osteomyelitis, type 2 diabetes, hypertension, A-fib, dyslipidemia, obesity with BMI of 48, who was recently hospital for osteomyelitis and he returns due to hip pain.        Patient was recently admitted to the hospital in early April for foot infection.  There was recommendation for amputation due to osteomyelitis in the foot and the patient refused.  He was unable to be placed at that time.  He is a sex offender making it very difficult for placement although [facility] was an option.  As result, he opted to go home on oral antibiotics to treat Alcaligenes facialis and Morganella morganii with doxycycline and ciprofloxacin.  The patient was seen in wound care in middle of April and at that time Dr. Daniels attempted to switch antibiotics to Levaquin per pharmacy recommendations based off culture data but was unable to get hold the patient so he was never switched.  The patient states the only reason he came to the emergency department was because his left hip was hurting.  Unaware of fevers.  Chills.  Cough.  Shortness of breath.  The patient came to the ER for hip pain.        In the emergency department the patient's vital signs are as follows temperature is 103.4, pulse is 139, blood pressure is 92/65, 95% on room air.  CBC shows a white blood cell count of 20.55 with neutrophils of 88.2.  CRP is 20.53, which is up from 7.4 in April and lactate is 2.6.  Sed rate was 42 in April and is currently 75.  Bicarb is 19.9 and sodium is 130.  Urinalysis shows positive nitrite and small leuk esterase but no bacteria.  Patient also seems to have a pneumonia in his right upper lobe.  X-ray of the right foot shows evidence of  osteomyelitis involving the distal aspect of the cuboid and gas in the soft tissue along the plantar aspect of the foot just proximal amputation site and periostitis involving the cuneiform.  Patient got 1 L of normal saline.  Patient received Zosyn and vancomycin for antibiotics.  Podiatry was consulted and did not feel like the foot was the source.  Patient will be admitted to the hospital for severe sepsis with unclear source at this time.  The source could be pneumonia, urinary tract infection or most likely the foot.  Patient will also be managed with A-fib with RVR.  Patient blood culture grew Streptococcus agalactiae.  Zyvox DC'd.  Patient does not want any amputation..  Patient wound culture from right foot also grew Streptococcus.    Interval Followup:   A-fib rate mostly controlled, episode of RVR up to 150 resolves spontaneously, off cardizem drip.  bp better  No fever.  Remains on room air with 91% saturation  Denies any palpitations or chest pain.  Itching of the eyes better  Does have cough with mucus.  And it makes his hip hurt  Complaining of pain in left hip more than right and asking for pain medications.  Patient has bad arthritis and oral pain medication does not help much.  IV morphine helps most but does not last long.    Review of Systems   All systems were reviewed and negative except for: Summary and interval follow-up    Objective   Objective     Vitals:   Temp:  [98.1 °F (36.7 °C)-98.6 °F (37 °C)] 98.2 °F (36.8 °C)  Heart Rate:  [] 104  Resp:  [18-20] 18  BP: (114-129)/(69-87) 114/69  Physical Exam      Constitutional: Awake, alert, no acute distress              Eyes: Pupils equal, sclerae anicteric, no conjunctival injection              HENT: NCAT, mucous membranes moist              Neck: Supple, no thyromegaly, no lymphadenopathy, trachea midline              Respiratory coarse breath sounds.  Body habitus makes it difficult to get a good lung exam              Cardiovascular:  Irregularly irregular, no murmurs, rubs, or gallops, palpable pedal pulses bilaterally              Gastrointestinal: Positive bowel sounds, soft, nontender, nondistended              Musculoskeletal: +1 bilateral ankle edema, no clubbing or cyanosis to extremities              Psychiatric: Appropriate affect, cooperative              Neurologic: Oriented x 3, strength symmetric in all extremities, Cranial Nerves grossly intact to confrontation, speech clear              Skin: No rashes.  Right foot shows amputation transmetatarsal.  There is a dehisced wound there is area of warmth and redness.  Right foot is  dressed.       Result Review    Result Review:  I have personally reviewed the results for the past 24 hours and agree with these findings:  [x]  Laboratory  [x]  Microbiology  [x]  Radiology  [x]  EKG/Telemetry A-fib 140  []  Cardiology/Vascular   []  Pathology  [x]  Old records  [x]  Other: Medications    Assessment & Plan   Assessment / Plan     Assessment:  Sepsis present on admission.  Patient has fever, tachycardia, lactic acidosis, elevated procalcitonin and leukocytosis.  Improving  Right upper lobe healthcare associated pneumonia .  Streptococcus agalactiae bacteremia  Chronic osteomyelitis of right foot involving cuboid and may be cuneiform.  Cellulitis of right foot due to Streptococcus agalactiae.  Paroxysmal A-fib with intermittent RVR on Eliquis.  Bilateral diabetic foot ulcers.  NIDDM.  Morbid obesity BMI of 48.1.  S/p TMA of right foot.  Hyponatremia.  Likely from hyperglycemia/volume overload.  Clinically significant.  Improving  Hypophosphatemia.  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    Plan:  Fluid restriction for hyponatremia  Check urine sodium.  Antibiotics changed to IV Rocephin  Check wound culture.  Await final culture data  Appreciate podiatry input.  Per podiatry right foot wound not source of infection.  Patient does not want below-knee amputation.  DC  midodrine  Repeat IV dig x1.  P.o. Cardizem.  Switch to long-acting  Resumed home metoprolol.  Dose doubled  IV and oral narcotics for pain control.  As needed Toradol started on Lyrica.  Allergy eyedrops for 2 days   MRSA PCR.,  Strep pneumonia and Legionella antigen negative  Sliding-scale insulin and carb consistent diet.  Wound nurse to evaluate patient.  PT OT.  Continue telemetry    Discussed plan with RN.  Return home when stable likely in the next day or so    DVT prophylaxis:  Medical DVT prophylaxis orders are present.  Home Eliquis    CODE STATUS:   Level Of Support Discussed With: Patient  Code Status (Patient has no pulse and is not breathing): CPR (Attempt to Resuscitate)  Medical Interventions (Patient has pulse or is breathing): Full Support      Part of this note may be an electronic transcription/translation of spoken language to printed text using the Dragon Dictation System.         Electronically signed by Shekhar Au MD, 05/15/23, 5:32 PM EDT.

## 2023-05-15 NOTE — PLAN OF CARE
Problem: Adult Inpatient Plan of Care  Goal: Plan of Care Review  Outcome: Ongoing, Progressing  Flowsheets (Taken 5/15/2023 1611)  Progress: improving  Plan of Care Reviewed With: patient  Outcome Evaluation: Patient AAO throughout shift. VSS. Patient is on RA. Pain treated per MAR. Wound care completed per wound nurse. Abx administered per MAR. BM this shift. Continuing with plan of care.   Goal Outcome Evaluation:  Plan of Care Reviewed With: patient        Progress: improving  Outcome Evaluation: Patient AAO throughout shift. VSS. Patient is on RA. Pain treated per MAR. Wound care completed per wound nurse. Abx administered per MAR. BM this shift. Continuing with plan of care.

## 2023-05-15 NOTE — THERAPY EVALUATION
Patient Name: Jose Shaikh  : 1960    MRN: [id number]                              Today's Date: 5/15/2023       Admit Date: 2023    Visit Dx:     ICD-10-CM ICD-9-CM   1. Acute febrile illness  R50.9 780.60   2. Sepsis without acute organ dysfunction, due to unspecified organism  A41.9 038.9     995.91   3. Pneumonia of right upper lobe due to infectious organism  J18.9 486   4. Uncontrolled type 2 diabetes mellitus with hyperglycemia  E11.65 250.02   5. Atrial fibrillation with rapid ventricular response  I48.91 427.31   6. Acute osteomyelitis of right foot  M86.171 730.07   7. Decreased activities of daily living (ADL)  Z78.9 V49.89     Patient Active Problem List   Diagnosis   • Diabetic ulcer of left heel associated with type 2 DM   • Acute osteomyelitis of left calcaneus    • Diabetic ulcer of left heel associated with type 2 DM   • Diabetic ulcer of right midfoot associated with type 2 DM   • Paroxysmal atrial fibrillation   • Essential hypertension   • Hyperlipidemia LDL goal <100   • Cellulitis and abscess of foot   • High alkaline phosphatase level   • Osteomyelitis   • Onychomycosis   • Onychocryptosis   • Foot pain, bilateral   • Osteomyelitis of foot, right, acute   • Cellulitis of right foot   • Type 2 diabetes mellitus, with long-term current use of insulin   • Class 3 severe obesity due to excess calories with serious comorbidity and body mass index (BMI) of 45.0 to 49.9 in adult   • Anxiety disorder, unspecified   • Claustrophobia   • Dependence on wheelchair   • Depression, unspecified   • Long term (current) use of anticoagulants   • Long term (current) use of oral hypoglycemic drugs   • Wound of foot   • Non-prs chronic ulcer oth prt r foot limited to brkdwn skin   • Orthostatic hypotension   • Other chronic osteomyelitis, right ankle and foot   • Personal history of nicotine dependence   • Thrombocytopenia, unspecified   • Unspecified open wound, right foot, initial encounter   •  Diabetic foot infection   • Subacute osteomyelitis of right foot   • Right foot pain   • Sepsis     Past Medical History:   Diagnosis Date   • Absence of toe of right foot    • Acute osteomyelitis of left calcaneus  8/18/2021   • Anxiety and depression    • Arthritis    • Claustrophobia    • Corns and callus    • Diabetic ulcer of left heel associated with type 2 DM 8/18/2021   • Diabetic ulcer of left heel associated with type 2 DM 7/6/2021   • Diabetic ulcer of right midfoot associated with type 2 DM 8/18/2021   • Difficulty walking    • Essential hypertension 8/31/2021   • Hammertoe    • Hyperlipidemia LDL goal <100 8/31/2021   • Ingrown toenail    • Obesity    • Paroxysmal atrial fibrillation 8/31/2021   • Polyneuropathy    • Pressure ulcer, stage 1    • Tinea unguium    • Type 2 diabetes mellitus with polyneuropathy      Past Surgical History:   Procedure Laterality Date   • CYST REMOVAL      center of back; benign   • INCISION AND DRAINAGE ABSCESS      back   • INCISION AND DRAINAGE LEG Right 12/10/2021    Procedure: INCISION AND DRAINAGE LOWER EXTREMITY;  Surgeon: Ash Leyva DPM;  Location: [facility] OR;  Service: Podiatry;  Laterality: Right;   • OTHER SURGICAL HISTORY      Surgical clips left foot   • TOE SURGERY Right     Removal of 5th toe   • TRANS METATARSAL AMPUTATION Right 12/2/2021    Procedure: AMPUTATION TRANS METATARSAL;  Surgeon: Ash Leyva DPM;  Location: [facility] OR;  Service: Podiatry;  Laterality: Right;   • WRIST SURGERY Left     repair of injury      General Information     Row Name 05/15/23 1123          OT Time and Intention    Document Type evaluation  -PG     Mode of Treatment individual therapy;occupational therapy  -PG     Row Name 05/15/23 1123          General Information    Patient Profile Reviewed yes  LIving alone. uses walker and receives mom's meals.  Reports he was walking at home and independ with all self-care.  -PG     Prior Level of Function  independent:;transfer;ADL's  -PG     Existing Precautions/Restrictions fall  -PG     Barriers to Rehab ineffective coping  -PG     Row Name 05/15/23 1123          Occupational Profile    Reason for Services/Referral (Occupational Profile) Pt is a 63 yo male admitted for sepsis and infected right foot.  Previous OT services unknown.  Pt being seen by OT due to recent decline in ADL function.  -PG     Row Name 05/15/23 1123          Living Environment    People in Home alone  -PG     Row Name 05/15/23 1123          Cognition    Orientation Status (Cognition) oriented x 3  -PG     Row Name 05/15/23 1123          Safety Issues, Functional Mobility    Impairments Affecting Function (Mobility) balance;endurance/activity tolerance;pain;strength  -PG           User Key  (r) = Recorded By, (t) = Taken By, (c) = Cosigned By    Initials Name Provider Type    PG Kodak Matos, OT Occupational Therapist                 Mobility/ADL's     Row Name 05/15/23 1126          Bed Mobility    Bed Mobility supine-sit  -PG     Supine-Sit [place] (Bed Mobility) maximum assist (25% patient effort)  -PG     Comment, (Bed Mobility) per nsg, max A x 1 supine to sit, 6 person assist sit to supine  -PG     Row Name 05/15/23 1126          Activities of Daily Living    BADL Assessment/Intervention bathing;upper body dressing;lower body dressing;grooming;toileting  -PG     Row Name 05/15/23 1126          Bathing Assessment/Intervention    [place] Level (Bathing) bathing skills;maximum assist (25% patient effort)  -PG     Row Name 05/15/23 1126          Upper Body Dressing Assessment/Training    [place] Level (Upper Body Dressing) upper body dressing skills;minimum assist (75% patient effort)  -PG     Row Name 05/15/23 1126          Lower Body Dressing Assessment/Training    [place] Level (Lower Body Dressing) lower body dressing skills;dependent (less than 25% patient effort)  -PG     Row Name 05/15/23 1126          Grooming  Assessment/Training    [place] Level (Grooming) grooming skills;set up  -PG     Row Name 05/15/23 1126          Toileting Assessment/Training    [place] Level (Toileting) toileting skills;dependent (less than 25% patient effort)  -PG           User Key  (r) = Recorded By, (t) = Taken By, (c) = Cosigned By    Initials Name Provider Type    PG Kodak Matos OT Occupational Therapist               Obj/Interventions     Row Name 05/15/23 1259          Sensory Assessment (Somatosensory)    Sensory Assessment (Somatosensory) sensation intact  -PG     Row Name 05/15/23 1259          Vision Assessment/Intervention    Visual Impairment/Limitations WFL  -PG     Row Name 05/15/23 1259          Range of Motion Comprehensive    General Range of Motion no range of motion deficits identified  -PG     Row Name 05/15/23 1259          Strength Comprehensive (MMT)    Comment, General Manual Muscle Testing (MMT) Assessment Left upper extremity 4+, right shoulder 3+/5  -PG     Row Name 05/15/23 1259          Motor Skills    Motor Skills coordination;functional endurance  -PG     Coordination WFL  -PG     Functional Endurance Fair minus  -PG           User Key  (r) = Recorded By, (t) = Taken By, (c) = Cosigned By    Initials Name Provider Type    PG Kodak Matos, OT Occupational Therapist               Goals/Plan     Row Name 05/15/23 1301          Transfer Goal 1 (OT)    Activity/Assistive Device (Transfer Goal 1, OT) transfers, all  -PG     [place] Level/Cues Needed (Transfer Goal 1, OT) modified independence  -PG     Time Frame (Transfer Goal 1, OT) long term goal (LTG);10 days  -PG     Row Name 05/15/23 1301          Bathing Goal 1 (OT)    Activity/Device (Bathing Goal 1, OT) bathing skills, all  -PG     [place] Level/Cues Needed (Bathing Goal 1, OT) modified independence  -PG     Time Frame (Bathing Goal 1, OT) long term goal (LTG);10 days  -PG     Row Name 05/15/23 1301          Dressing Goal 1 (OT)     Activity/Device (Dressing Goal 1, OT) dressing skills, all  -PG     [place]/Cues Needed (Dressing Goal 1, OT) modified independence  -PG     Time Frame (Dressing Goal 1, OT) long term goal (LTG);10 days  -PG     Row Name 05/15/23 1301          Toileting Goal 1 (OT)    Activity/Device (Toileting Goal 1, OT) toileting skills, all  -PG     [place] Level/Cues Needed (Toileting Goal 1, OT) modified independence  -PG     Time Frame (Toileting Goal 1, OT) long term goal (LTG);10 days  -PG     Row Name 05/15/23 1301          Grooming Goal 1 (OT)    Activity/Device (Grooming Goal 1, OT) grooming skills, all  -PG     [place] (Grooming Goal 1, OT) modified independence  -PG     Time Frame (Grooming Goal 1, OT) long term goal (LTG);10 days  -PG     Row Name 05/15/23 1301          Strength Goal 1 (OT)    Strength Goal 1 (OT) Patient will improve right shoulder strength to 4/5 to support independence with self-care activities and functional transfers  -PG     Time Frame (Strength Goal 1, OT) long term goal (LTG);10 days  -PG     Row Name 05/15/23 1301          Problem Specific Goal 1 (OT)    Problem Specific Goal 1 (OT) Patient will improve activity tolerance to fair plus to support independence with ADL activities  -PG     Time Frame (Problem Specific Goal 1, OT) long term goal (LTG);10 days  -PG     Row Name 05/15/23 1301          Therapy Assessment/Plan (OT)    Planned Therapy Interventions (OT) activity tolerance training;BADL retraining;strengthening exercise;transfer/mobility retraining;patient/caregiver education/training;occupation/activity based interventions  -PG           User Key  (r) = Recorded By, (t) = Taken By, (c) = Cosigned By    Initials Name Provider Type    PG Kodak Matos OT Occupational Therapist               Clinical Impression     Row Name 05/15/23 1300          Pain Assessment    Pretreatment Pain Rating 8/10  -PG     Posttreatment Pain Rating 8/10  -PG     Pain Location -  Side/Orientation Right  -PG     Pain Location - foot  -PG     Pain Intervention(s) Nursing Notified  -PG     Row Name 05/15/23 1300          Plan of Care Review    Plan of Care Reviewed With patient  -PG     Progress no change  -PG     Outcome Evaluation Patient presents with limitations affecting strength, activity tolerance, and balance impacting patient's ability to return home safely and independently.  The skills of a therapist will be required to safely and effectively implement the following treatment plan to restore maximal level of function  -PG     Row Name 05/15/23 1300          Therapy Assessment/Plan (OT)    Patient/Family Therapy Goal Statement (OT) Return home and be able to take care of himself independently  -PG     Rehab Potential (OT) good, to achieve stated therapy goals  -PG     Criteria for Skilled Therapeutic Interventions Met (OT) yes;meets criteria;skilled treatment is necessary  -PG     Therapy Frequency (OT) 5 times/wk  -PG     Row Name 05/15/23 1300          Therapy Plan Review/Discharge Plan (OT)    Anticipated Discharge Disposition (OT) extended care facility;sub acute care setting  -PG           User Key  (r) = Recorded By, (t) = Taken By, (c) = Cosigned By    Initials Name Provider Type    PG Kodak Matos, OT Occupational Therapist               Outcome Measures     Row Name 05/15/23 1302          How much help from another is currently needed...    Putting on and taking off regular lower body clothing? 1  -PG     Bathing (including washing, rinsing, and drying) 2  -PG     Toileting (which includes using toilet bed pan or urinal) 1  -PG     Putting on and taking off regular upper body clothing 3  -PG     Taking care of personal grooming (such as brushing teeth) 3  -PG     Eating meals 4  -PG     AM-PAC 6 Clicks Score (OT) 14  -PG     Row Name 05/15/23 0913          How much help from another person do you currently need...    Turning from your back to your side while in flat bed  without using bedrails? 3  -KD     Moving from lying on back to sitting on the side of a flat bed without bedrails? 2  -KD     Moving to and from a bed to a chair (including a wheelchair)? 2  -KD     Standing up from a chair using your arms (e.g., wheelchair, bedside chair)? 1  -KD     Climbing 3-5 steps with a railing? 1  -KD     To walk in hospital room? 1  -KD     AM-PAC 6 Clicks Score (PT) 10  -KD     Highest level of mobility 4 --> Transferred to chair/commode  -KD     Row Name 05/15/23 1302          Functional Assessment    Outcome Measure Options AM-PAC 6 Clicks Daily Activity (OT);Optimal Instrument  -PG     Row Name 05/15/23 1302          Optimal Instrument    Optimal Instrument Optimal - 3  -PG     Bending/Stooping 5  -PG     Standing 5  -PG     Reaching 1  -PG     From the list, choose the 3 activities you would most like to be able to do without any difficulty Bending/stooping;Standing;Reaching  -PG     Total Score Optimal - 3 11  -PG           User Key  (r) = Recorded By, (t) = Taken By, (c) = Cosigned By    Initials Name Provider Type    PG Kodak Matos OT Occupational Therapist    KD Devorah Martínez, RN Registered Nurse                Occupational Therapy Education     Title: PT OT SLP Therapies (Done)     Topic: Occupational Therapy (Done)     Point: ADL training (Done)     Description:   Instruct learner(s) on proper safety adaptation and remediation techniques during self care or transfers.   Instruct in proper use of assistive devices.              Learning Progress Summary           Patient Acceptance, E,D, DU by PG at 5/15/2023 1303                   Point: Home exercise program (Done)     Description:   Instruct learner(s) on appropriate technique for monitoring, assisting and/or progressing therapeutic exercises/activities.              Learning Progress Summary           Patient Acceptance, E,D, DU by PG at 5/15/2023 1303                   Point: Precautions (Done)     Description:    Instruct learner(s) on prescribed precautions during self-care and functional transfers.              Learning Progress Summary           Patient Acceptance, E,D, DU by PG at 5/15/2023 1303                   Point: Body mechanics (Done)     Description:   Instruct learner(s) on proper positioning and spine alignment during self-care, functional mobility activities and/or exercises.              Learning Progress Summary           Patient Acceptance, E,D, DU by PG at 5/15/2023 1303                               User Key     Initials Effective Dates Name Provider Type Discipline    PG 06/16/21 -  Kodak Matos OT Occupational Therapist OT              OT Recommendation and Plan  Planned Therapy Interventions (OT): activity tolerance training, BADL retraining, strengthening exercise, transfer/mobility retraining, patient/caregiver education/training, occupation/activity based interventions  Therapy Frequency (OT): 5 times/wk  Plan of Care Review  Plan of Care Reviewed With: patient  Progress: no change  Outcome Evaluation: Patient presents with limitations affecting strength, activity tolerance, and balance impacting patient's ability to return home safely and independently.  The skills of a therapist will be required to safely and effectively implement the following treatment plan to restore maximal level of function     Time Calculation:    Time Calculation- OT     Row Name 05/15/23 1304             Time Calculation- OT    OT Received On 05/15/23  -PG      OT Goal Re-Cert Due Date 05/24/23  -PG         Untimed Charges    OT Eval/Re-eval Minutes 30  -PG         Total Minutes    Untimed Charges Total Minutes 30  -PG       Total Minutes 30  -PG            User Key  (r) = Recorded By, (t) = Taken By, (c) = Cosigned By    Initials Name Provider Type    PG Kodak Matos OT Occupational Therapist              Therapy Charges for Today     Code Description Service Date Service Provider Modifiers Qty    [id number]  OT  EVAL LOW COMPLEXITY 2 5/15/2023 Kodak Matos, OT GO 1               Kodak Matos, BEKAH  5/15/2023

## 2023-05-15 NOTE — SIGNIFICANT NOTE
Wound Eval / Progress Noted    KEO Dominguez     Patient Name: Jose Shaikh  : 1960  MRN: [id number]  Today's Date: 5/15/2023                 Admit Date: 2023    Visit Dx:    ICD-10-CM ICD-9-CM   1. Acute febrile illness  R50.9 780.60   2. Sepsis without acute organ dysfunction, due to unspecified organism  A41.9 038.9     995.91   3. Pneumonia of right upper lobe due to infectious organism  J18.9 486   4. Uncontrolled type 2 diabetes mellitus with hyperglycemia  E11.65 250.02   5. Atrial fibrillation with rapid ventricular response  I48.91 427.31   6. Acute osteomyelitis of right foot  M86.171 730.07   7. Decreased activities of daily living (ADL)  Z78.9 V49.89       Patient Active Problem List   Diagnosis   • Diabetic ulcer of left heel associated with type 2 DM   • Acute osteomyelitis of left calcaneus    • Diabetic ulcer of left heel associated with type 2 DM   • Diabetic ulcer of right midfoot associated with type 2 DM   • Paroxysmal atrial fibrillation   • Essential hypertension   • Hyperlipidemia LDL goal <100   • Cellulitis and abscess of foot   • High alkaline phosphatase level   • Osteomyelitis   • Onychomycosis   • Onychocryptosis   • Foot pain, bilateral   • Osteomyelitis of foot, right, acute   • Cellulitis of right foot   • Type 2 diabetes mellitus, with long-term current use of insulin   • Class 3 severe obesity due to excess calories with serious comorbidity and body mass index (BMI) of 45.0 to 49.9 in adult   • Anxiety disorder, unspecified   • Claustrophobia   • Dependence on wheelchair   • Depression, unspecified   • Long term (current) use of anticoagulants   • Long term (current) use of oral hypoglycemic drugs   • Wound of foot   • Non-prs chronic ulcer oth prt r foot limited to brkdwn skin   • Orthostatic hypotension   • Other chronic osteomyelitis, right ankle and foot   • Personal history of nicotine dependence   • Thrombocytopenia, unspecified   • Unspecified open wound, right  foot, initial encounter   • Diabetic foot infection   • Subacute osteomyelitis of right foot   • Right foot pain   • Sepsis        Past Medical History:   Diagnosis Date   • Absence of toe of right foot    • Acute osteomyelitis of left calcaneus  8/18/2021   • Anxiety and depression    • Arthritis    • Claustrophobia    • Corns and callus    • Diabetic ulcer of left heel associated with type 2 DM 8/18/2021   • Diabetic ulcer of left heel associated with type 2 DM 7/6/2021   • Diabetic ulcer of right midfoot associated with type 2 DM 8/18/2021   • Difficulty walking    • Essential hypertension 8/31/2021   • Hammertoe    • Hyperlipidemia LDL goal <100 8/31/2021   • Ingrown toenail    • Obesity    • Paroxysmal atrial fibrillation 8/31/2021   • Polyneuropathy    • Pressure ulcer, stage 1    • Tinea unguium    • Type 2 diabetes mellitus with polyneuropathy         Past Surgical History:   Procedure Laterality Date   • CYST REMOVAL      center of back; benign   • INCISION AND DRAINAGE ABSCESS      back   • INCISION AND DRAINAGE LEG Right 12/10/2021    Procedure: INCISION AND DRAINAGE LOWER EXTREMITY;  Surgeon: Ash Leyva DPM;  Location: [facility];  Service: Podiatry;  Laterality: Right;   • OTHER SURGICAL HISTORY      Surgical clips left foot   • TOE SURGERY Right     Removal of 5th toe   • TRANS METATARSAL AMPUTATION Right 12/2/2021    Procedure: AMPUTATION TRANS METATARSAL;  Surgeon: Ash Leyva DPM;  Location: [facility];  Service: Podiatry;  Laterality: Right;   • WRIST SURGERY Left     repair of injury         Physical Assessment:  Wound 06/22/21 1133 Left lateral heel (Active)   Wound Image   05/15/23 1120   Dressing Appearance intact;dry 05/15/23 1120   Closure None 05/15/23 1120   Base red;moist;bleeding 05/15/23 1120   Red (%), Wound Tissue Color 100 05/15/23 1120   Periwound redness;moist;pink 05/15/23 1120   Periwound Temperature warm 05/15/23 1120   Periwound Skin Turgor firm  05/15/23 1120   Edges callused;open 05/15/23 1120   Wound Length (cm) 2 cm 05/15/23 1120   Wound Width (cm) 1.9 cm 05/15/23 1120   Wound Depth (cm) 1.3 cm 05/15/23 1120   Wound Surface Area (cm^2) 3.8 cm^2 05/15/23 1120   Wound Volume (cm^3) 4.94 cm^3 05/15/23 1120   Drainage Characteristics/Odor bleeding controlled;sanguineous 05/15/23 1120   Drainage Amount scant 05/15/23 1120   Care, Wound cleansed with;irrigated with;sterile normal saline 05/15/23 1120   Dressing Care dressing applied;gauze, antimicrobial;gauze, wet-to-moist;silicone;border dressing 05/15/23 1120   Periwound Care absorptive dressing applied 05/15/23 1120       Wound 12/02/21 Right anterior foot Incision (Active)   Wound Image   05/15/23 1120   Dressing Appearance intact;dry 05/15/23 1120   Closure None 05/15/23 1120   Base red;moist;bleeding 05/15/23 1120   Red (%), Wound Tissue Color 100 05/15/23 1120   Periwound redness;pink;moist 05/15/23 1120   Periwound Temperature warm 05/15/23 1120   Periwound Skin Turgor soft 05/15/23 1120   Edges open;callused 05/15/23 1120   Wound Length (cm) 6 cm 05/15/23 1120   Wound Width (cm) 4.5 cm 05/15/23 1120   Wound Depth (cm) 1.5 cm 05/15/23 1120   Wound Surface Area (cm^2) 27 cm^2 05/15/23 1120   Wound Volume (cm^3) 40.5 cm^3 05/15/23 1120   Drainage Characteristics/Odor sanguineous;bleeding controlled 05/15/23 1120   Drainage Amount small 05/15/23 1120   Care, Wound cleansed with;irrigated with;sterile normal saline 05/15/23 1120   Dressing Care dressing applied;gauze, antimicrobial;gauze, wet-to-moist;gauze, dry;tubular wrap;gauze 05/15/23 1120   Periwound Care absorptive dressing applied 05/15/23 1120        Wound Check / Follow-up:  Patient seen today for a wound consult. This patient is seen at the wound clinic for treatment to his chronic non healing wound to bilateral feet. Patient with a diabetic ulceration to the plantar heel on the left foot and a postoperative wound dehiscence to the right foot  per wound physician. Cleansed with NS. Wound bases are red moist. Tissue is boggy with some depth noted. No tunnels evident at this time. Applied betadine moistened fluffed gauze to the wound bases and the covered with dry gauze and gauze roll. Recommend BID dressing changes with betadine moistened fluffed gauze to the wound bases.    Impression: chronic ulceration to bilateral feet     Short term goals:  Regain skin integrity, pressure reduction, skin protection, BID dressing changes    Karon Bolton RN    5/15/2023    14:20 EDT

## 2023-05-15 NOTE — PLAN OF CARE
Goal Outcome Evaluation:    Patient is alert and oriented x 4, complaining of left hip pain throughout the shift, pain medication given as needed, wound dressing changed,no pertinent change in condition, to continue care plan.

## 2023-05-16 LAB
ALBUMIN SERPL-MCNC: 2.3 G/DL (ref 3.5–5.2)
ANION GAP SERPL CALCULATED.3IONS-SCNC: 4.6 MMOL/L (ref 5–15)
BUN SERPL-MCNC: 12 MG/DL (ref 8–23)
BUN/CREAT SERPL: 21.1 (ref 7–25)
CALCIUM SPEC-SCNC: 8.4 MG/DL (ref 8.6–10.5)
CHLORIDE SERPL-SCNC: 101 MMOL/L (ref 98–107)
CO2 SERPL-SCNC: 28.4 MMOL/L (ref 22–29)
CREAT SERPL-MCNC: 0.57 MG/DL (ref 0.76–1.27)
EGFRCR SERPLBLD CKD-EPI 2021: 109.5 ML/MIN/1.73
GLUCOSE BLDC GLUCOMTR-MCNC: 201 MG/DL (ref 70–99)
GLUCOSE BLDC GLUCOMTR-MCNC: 228 MG/DL (ref 70–99)
GLUCOSE BLDC GLUCOMTR-MCNC: 288 MG/DL (ref 70–99)
GLUCOSE BLDC GLUCOMTR-MCNC: 295 MG/DL (ref 70–99)
GLUCOSE SERPL-MCNC: 235 MG/DL (ref 65–99)
PHOSPHATE SERPL-MCNC: 2.6 MG/DL (ref 2.5–4.5)
POTASSIUM SERPL-SCNC: 4.8 MMOL/L (ref 3.5–5.2)
SODIUM SERPL-SCNC: 134 MMOL/L (ref 136–145)
WHOLE BLOOD HOLD SPECIMEN: NORMAL

## 2023-05-16 PROCEDURE — 63710000001 INSULIN DETEMIR PER 5 UNITS: Performed by: INTERNAL MEDICINE

## 2023-05-16 PROCEDURE — 25010000002 MORPHINE PER 10 MG: Performed by: INTERNAL MEDICINE

## 2023-05-16 PROCEDURE — 63710000001 INSULIN LISPRO (HUMAN) PER 5 UNITS

## 2023-05-16 PROCEDURE — 80069 RENAL FUNCTION PANEL: CPT | Performed by: INTERNAL MEDICINE

## 2023-05-16 PROCEDURE — 99233 SBSQ HOSP IP/OBS HIGH 50: CPT | Performed by: INTERNAL MEDICINE

## 2023-05-16 PROCEDURE — 97161 PT EVAL LOW COMPLEX 20 MIN: CPT

## 2023-05-16 PROCEDURE — 0 CEFTRIAXONE PER 250 MG: Performed by: INTERNAL MEDICINE

## 2023-05-16 PROCEDURE — 97110 THERAPEUTIC EXERCISES: CPT

## 2023-05-16 PROCEDURE — 94799 UNLISTED PULMONARY SVC/PX: CPT

## 2023-05-16 PROCEDURE — 82948 REAGENT STRIP/BLOOD GLUCOSE: CPT

## 2023-05-16 RX ORDER — DILTIAZEM HYDROCHLORIDE 120 MG/1
120 CAPSULE, COATED, EXTENDED RELEASE ORAL
Status: DISCONTINUED | OUTPATIENT
Start: 2023-05-17 | End: 2023-06-08

## 2023-05-16 RX ORDER — MIDODRINE HYDROCHLORIDE 5 MG/1
5 TABLET ORAL
Status: DISCONTINUED | OUTPATIENT
Start: 2023-05-16 | End: 2023-06-07

## 2023-05-16 RX ORDER — MIDAZOLAM HYDROCHLORIDE 2 MG/2ML
2 INJECTION, SOLUTION INTRAMUSCULAR; INTRAVENOUS
Status: DISCONTINUED | OUTPATIENT
Start: 2023-05-17 | End: 2023-05-17

## 2023-05-16 RX ORDER — DIGOXIN 125 MCG
125 TABLET ORAL
Status: DISCONTINUED | OUTPATIENT
Start: 2023-05-16 | End: 2023-06-15 | Stop reason: HOSPADM

## 2023-05-16 RX ADMIN — ATORVASTATIN CALCIUM 10 MG: 10 TABLET, FILM COATED ORAL at 22:16

## 2023-05-16 RX ADMIN — ACETAMINOPHEN 1000 MG: 325 TABLET ORAL at 13:11

## 2023-05-16 RX ADMIN — PREGABALIN 25 MG: 25 CAPSULE ORAL at 13:10

## 2023-05-16 RX ADMIN — METOPROLOL SUCCINATE 50 MG: 50 TABLET, EXTENDED RELEASE ORAL at 08:05

## 2023-05-16 RX ADMIN — Medication 10 ML: at 22:18

## 2023-05-16 RX ADMIN — INSULIN LISPRO 4 UNITS: 100 INJECTION, SOLUTION INTRAVENOUS; SUBCUTANEOUS at 22:16

## 2023-05-16 RX ADMIN — INSULIN LISPRO 4 UNITS: 100 INJECTION, SOLUTION INTRAVENOUS; SUBCUTANEOUS at 11:51

## 2023-05-16 RX ADMIN — MORPHINE SULFATE 2 MG: 2 INJECTION, SOLUTION INTRAMUSCULAR; INTRAVENOUS at 11:52

## 2023-05-16 RX ADMIN — INSULIN DETEMIR 15 UNITS: 100 INJECTION, SOLUTION SUBCUTANEOUS at 22:17

## 2023-05-16 RX ADMIN — MORPHINE SULFATE 2 MG: 2 INJECTION, SOLUTION INTRAMUSCULAR; INTRAVENOUS at 19:43

## 2023-05-16 RX ADMIN — MIDODRINE HYDROCHLORIDE 5 MG: 5 TABLET ORAL at 18:07

## 2023-05-16 RX ADMIN — PREGABALIN 25 MG: 25 CAPSULE ORAL at 05:34

## 2023-05-16 RX ADMIN — DILTIAZEM HYDROCHLORIDE 120 MG: 120 CAPSULE, COATED, EXTENDED RELEASE ORAL at 08:05

## 2023-05-16 RX ADMIN — CEFTRIAXONE SODIUM 2 G: 2 INJECTION, SOLUTION INTRAVENOUS at 13:10

## 2023-05-16 RX ADMIN — ACETAMINOPHEN 1000 MG: 325 TABLET ORAL at 22:16

## 2023-05-16 RX ADMIN — APIXABAN 5 MG: 5 TABLET, FILM COATED ORAL at 08:05

## 2023-05-16 RX ADMIN — Medication 10 ML: at 11:52

## 2023-05-16 RX ADMIN — DIGOXIN 125 MCG: 125 TABLET ORAL at 11:52

## 2023-05-16 RX ADMIN — MORPHINE SULFATE 2 MG: 2 INJECTION, SOLUTION INTRAMUSCULAR; INTRAVENOUS at 05:34

## 2023-05-16 RX ADMIN — APIXABAN 5 MG: 5 TABLET, FILM COATED ORAL at 22:16

## 2023-05-16 RX ADMIN — INSULIN DETEMIR 15 UNITS: 100 INJECTION, SOLUTION SUBCUTANEOUS at 08:05

## 2023-05-16 RX ADMIN — METOPROLOL SUCCINATE 50 MG: 50 TABLET, EXTENDED RELEASE ORAL at 22:16

## 2023-05-16 RX ADMIN — INSULIN LISPRO 3 UNITS: 100 INJECTION, SOLUTION INTRAVENOUS; SUBCUTANEOUS at 18:07

## 2023-05-16 RX ADMIN — PREGABALIN 25 MG: 25 CAPSULE ORAL at 22:16

## 2023-05-16 RX ADMIN — INSULIN LISPRO 3 UNITS: 100 INJECTION, SOLUTION INTRAVENOUS; SUBCUTANEOUS at 08:05

## 2023-05-16 RX ADMIN — HYDROCODONE BITARTRATE AND ACETAMINOPHEN 1 TABLET: 5; 325 TABLET ORAL at 22:16

## 2023-05-16 RX ADMIN — FAMOTIDINE 40 MG: 20 TABLET ORAL at 08:05

## 2023-05-16 NOTE — THERAPY TREATMENT NOTE
Patient Name: Jose Shaikh  : 1960    MRN: [id number]                              Today's Date: 2023       Admit Date: 2023    Visit Dx:     ICD-10-CM ICD-9-CM   1. Acute febrile illness  R50.9 780.60   2. Sepsis without acute organ dysfunction, due to unspecified organism  A41.9 038.9     995.91   3. Pneumonia of right upper lobe due to infectious organism  J18.9 486   4. Uncontrolled type 2 diabetes mellitus with hyperglycemia  E11.65 250.02   5. Atrial fibrillation with rapid ventricular response  I48.91 427.31   6. Acute osteomyelitis of right foot  M86.171 730.07   7. Decreased activities of daily living (ADL)  Z78.9 V49.89     Patient Active Problem List   Diagnosis   • Diabetic ulcer of left heel associated with type 2 DM   • Acute osteomyelitis of left calcaneus    • Diabetic ulcer of left heel associated with type 2 DM   • Diabetic ulcer of right midfoot associated with type 2 DM   • Paroxysmal atrial fibrillation   • Essential hypertension   • Hyperlipidemia LDL goal <100   • Cellulitis and abscess of foot   • High alkaline phosphatase level   • Osteomyelitis   • Onychomycosis   • Onychocryptosis   • Foot pain, bilateral   • Osteomyelitis of foot, right, acute   • Cellulitis of right foot   • Type 2 diabetes mellitus, with long-term current use of insulin   • Class 3 severe obesity due to excess calories with serious comorbidity and body mass index (BMI) of 45.0 to 49.9 in adult   • Anxiety disorder, unspecified   • Claustrophobia   • Dependence on wheelchair   • Depression, unspecified   • Long term (current) use of anticoagulants   • Long term (current) use of oral hypoglycemic drugs   • Wound of foot   • Non-prs chronic ulcer oth prt r foot limited to brkdwn skin   • Orthostatic hypotension   • Other chronic osteomyelitis, right ankle and foot   • Personal history of nicotine dependence   • Thrombocytopenia, unspecified   • Unspecified open wound, right foot, initial encounter   •  Diabetic foot infection   • Subacute osteomyelitis of right foot   • Right foot pain   • Sepsis     Past Medical History:   Diagnosis Date   • Absence of toe of right foot    • Acute osteomyelitis of left calcaneus  8/18/2021   • Anxiety and depression    • Arthritis    • Claustrophobia    • Corns and callus    • Diabetic ulcer of left heel associated with type 2 DM 8/18/2021   • Diabetic ulcer of left heel associated with type 2 DM 7/6/2021   • Diabetic ulcer of right midfoot associated with type 2 DM 8/18/2021   • Difficulty walking    • Essential hypertension 8/31/2021   • Hammertoe    • Hyperlipidemia LDL goal <100 8/31/2021   • Ingrown toenail    • Obesity    • Paroxysmal atrial fibrillation 8/31/2021   • Polyneuropathy    • Pressure ulcer, stage 1    • Tinea unguium    • Type 2 diabetes mellitus with polyneuropathy      Past Surgical History:   Procedure Laterality Date   • CYST REMOVAL      center of back; benign   • INCISION AND DRAINAGE ABSCESS      back   • INCISION AND DRAINAGE LEG Right 12/10/2021    Procedure: INCISION AND DRAINAGE LOWER EXTREMITY;  Surgeon: Ash Leyva DPM;  Location: [facility] OR;  Service: Podiatry;  Laterality: Right;   • OTHER SURGICAL HISTORY      Surgical clips left foot   • TOE SURGERY Right     Removal of 5th toe   • TRANS METATARSAL AMPUTATION Right 12/2/2021    Procedure: AMPUTATION TRANS METATARSAL;  Surgeon: Ash Leyva DPM;  Location: [facility] OR;  Service: Podiatry;  Laterality: Right;   • WRIST SURGERY Left     repair of injury      General Information     Row Name 05/16/23 1128          OT Time and Intention    Document Type therapy note (daily note)  -AV     Mode of Treatment individual therapy;occupational therapy  -AV     Row Name 05/16/23 1128          General Information    Existing Precautions/Restrictions fall  -AV     Row Name 05/16/23 1128          Cognition    Orientation Status (Cognition) --  alert and cooperative. able to  perform therapeutic exercises wi minimal cues/ demonstration.  -AV     [facility] Name 05/16/23 1128          Safety Issues, Functional Mobility    Impairments Affecting Function (Mobility) balance;endurance/activity tolerance;strength  -AV           User Key  (r) = Recorded By, (t) = Taken By, (c) = Cosigned By    Initials Name Provider Type    Uvaldo Hardy OT Occupational Therapist                 Mobility/ADL's    No documentation.                Obj/Interventions     [facility] Name 05/16/23 1129          Shoulder (Therapeutic Exercise)    Shoulder (Therapeutic Exercise) strengthening exercise  -AV     Shoulder Strengthening (Therapeutic Exercise) bilateral;horizontal aBduction/aDduction;1 lb free weight;20 repititions  -\[facility]\"" Name 05/16/23 1129          Elbow/Forearm (Therapeutic Exercise)    Elbow/Forearm (Therapeutic Exercise) strengthening exercise  -AV     Elbow/Forearm Strengthening (Therapeutic Exercise) bilateral;flexion;extension;supination;pronation;1 lb free weight;20 repititions  -AV     Row Name 05/16/23 1129          Motor Skills    Therapeutic Exercise shoulder;elbow/forearm  performed in high-Walkre's/ room air. slow, deliberate movements.  -AV           User Key  (r) = Recorded By, (t) = Taken By, (c) = Cosigned By    Initials Name Provider Type    Uvaldo Hardy OT Occupational Therapist               Goals/Plan    No documentation.                Clinical Impression     Row Name 05/16/23 1130          Pain Assessment    Additional Documentation Pain Scale: FACES Pre/Post-Treatment (Group)  -AV     Row Name 05/16/23 1130          Pain Scale: FACES Pre/Post-Treatment    Pain: FACES Scale, Pretreatment 0-->no hurt  -AV     Posttreatment Pain Rating 0-->no hurt  -AV     Row Name 05/16/23 1130          Plan of Care Review    Progress no change  -     Outcome Evaluation Patient performed upper extremity therapeutic exercises with 1# resistance. Continued OT is indicated to remediate/compensate  for deficits to maximize independence and safety with functional tasks.  -AV     Row Name 05/16/23 1130          Vital Signs    O2 Delivery Pre Treatment room air  -AV     O2 Delivery Intra Treatment room air  -AV     O2 Delivery Post Treatment room air  -AV           User Key  (r) = Recorded By, (t) = Taken By, (c) = Cosigned By    Initials Name Provider Type    Uvaldo Hardy OT Occupational Therapist               Outcome Measures     Row Name 05/16/23 1131          How much help from another is currently needed...    Putting on and taking off regular lower body clothing? 1  -AV     Bathing (including washing, rinsing, and drying) 2  -AV     Toileting (which includes using toilet bed pan or urinal) 1  -AV     Putting on and taking off regular upper body clothing 3  -AV     Taking care of personal grooming (such as brushing teeth) 3  -AV     Eating meals 4  -AV     AM-PAC 6 Clicks Score (OT) 14  -AV     Row Name 05/16/23 0805          How much help from another person do you currently need...    Turning from your back to your side while in flat bed without using bedrails? 3  -KD     Moving from lying on back to sitting on the side of a flat bed without bedrails? 2  -KD     Moving to and from a bed to a chair (including a wheelchair)? 2  -KD     Standing up from a chair using your arms (e.g., wheelchair, bedside chair)? 1  -KD     Climbing 3-5 steps with a railing? 1  -KD     To walk in hospital room? 1  -KD     AM-PAC 6 Clicks Score (PT) 10  -KD     Highest level of mobility 4 --> Transferred to chair/commode  -KD     Row Name 05/16/23 1131          Optimal Instrument    Bending/Stooping 5  -AV     Standing 5  -AV     Reaching 1  -AV           User Key  (r) = Recorded By, (t) = Taken By, (c) = Cosigned By    Initials Name Provider Type    Uvaldo Hardy OT Occupational Therapist    Devorah Caldera RN Registered Nurse                Occupational Therapy Education     Title: PT OT SLP Therapies (Done)      Topic: Occupational Therapy (Done)     Point: ADL training (Done)     Description:   Instruct learner(s) on proper safety adaptation and remediation techniques during self care or transfers.   Instruct in proper use of assistive devices.              Learning Progress Summary           Patient Acceptance, E,D, DU by PG at 5/15/2023 1303                   Point: Home exercise program (Done)     Description:   Instruct learner(s) on appropriate technique for monitoring, assisting and/or progressing therapeutic exercises/activities.              Learning Progress Summary           Patient Acceptance, E,D, DU by PG at 5/15/2023 1303                   Point: Precautions (Done)     Description:   Instruct learner(s) on prescribed precautions during self-care and functional transfers.              Learning Progress Summary           Patient Acceptance, E,D, DU by PG at 5/15/2023 1303                   Point: Body mechanics (Done)     Description:   Instruct learner(s) on proper positioning and spine alignment during self-care, functional mobility activities and/or exercises.              Learning Progress Summary           Patient Acceptance, E,D, DU by PG at 5/15/2023 1303                               User Key     Initials Effective Dates Name Provider Type Discipline    PG 06/16/21 -  Kodak Matos OT Occupational Therapist OT              OT Recommendation and Plan     Plan of Care Review  Progress: no change  Outcome Evaluation: Patient performed upper extremity therapeutic exercises with 1# resistance. Continued OT is indicated to remediate/compensate for deficits to maximize independence and safety with functional tasks.     Time Calculation:    Time Calculation- OT     Row Name 05/16/23 1133             Time Calculation- OT    OT Received On 05/16/23  -AV      OT Goal Re-Cert Due Date 05/24/23  -AV         Timed Charges    64799 - OT Therapeutic Exercise Minutes 10  -AV         Total Minutes    Timed Charges  Total Minutes 10  -AV       Total Minutes 10  -AV            User Key  (r) = Recorded By, (t) = Taken By, (c) = Cosigned By    Initials Name Provider Type    Uvaldo Hardy OT Occupational Therapist              Therapy Charges for Today     Code Description Service Date Service Provider Modifiers Qty    56332189302  OT THER PROC EA 15 MIN 5/16/2023 Uvaldo Christine OT GO 1               Uvaldo Christine OT  5/16/2023

## 2023-05-16 NOTE — THERAPY EVALUATION
Acute Care - Physical Therapy Initial Evaluation  [facility]     Patient Name: Jose Shaikh  : 1960  MRN: [id number]  Today's Date: 2023      Visit Dx: Admit date: 2023     Referring Physician: Shekhar Au MD     Surgery Date:* No surgery found *            ICD-10-CM ICD-9-CM   1. Acute febrile illness  R50.9 780.60   2. Sepsis without acute organ dysfunction, due to unspecified organism  A41.9 038.9     995.91   3. Pneumonia of right upper lobe due to infectious organism  J18.9 486   4. Uncontrolled type 2 diabetes mellitus with hyperglycemia  E11.65 250.02   5. Atrial fibrillation with rapid ventricular response  I48.91 427.31   6. Acute osteomyelitis of right foot  M86.171 730.07   7. Decreased activities of daily living (ADL)  Z78.9 V49.89   8. Difficulty in walking  R26.2 719.7     Patient Active Problem List   Diagnosis   • Diabetic ulcer of left heel associated with type 2 DM   • Acute osteomyelitis of left calcaneus    • Diabetic ulcer of left heel associated with type 2 DM   • Diabetic ulcer of right midfoot associated with type 2 DM   • Paroxysmal atrial fibrillation   • Essential hypertension   • Hyperlipidemia LDL goal <100   • Cellulitis and abscess of foot   • High alkaline phosphatase level   • Osteomyelitis   • Onychomycosis   • Onychocryptosis   • Foot pain, bilateral   • Osteomyelitis of foot, right, acute   • Cellulitis of right foot   • Type 2 diabetes mellitus, with long-term current use of insulin   • Class 3 severe obesity due to excess calories with serious comorbidity and body mass index (BMI) of 45.0 to 49.9 in adult   • Anxiety disorder, unspecified   • Claustrophobia   • Dependence on wheelchair   • Depression, unspecified   • Long term (current) use of anticoagulants   • Long term (current) use of oral hypoglycemic drugs   • Wound of foot   • Non-prs chronic ulcer oth prt r foot limited to brkdwn skin   • Orthostatic hypotension   • Other chronic osteomyelitis,  right ankle and foot   • Personal history of nicotine dependence   • Thrombocytopenia, unspecified   • Unspecified open wound, right foot, initial encounter   • Diabetic foot infection   • Subacute osteomyelitis of right foot   • Right foot pain   • Sepsis     Past Medical History:   Diagnosis Date   • Absence of toe of right foot    • Acute osteomyelitis of left calcaneus  8/18/2021   • Anxiety and depression    • Arthritis    • Claustrophobia    • Corns and callus    • Diabetic ulcer of left heel associated with type 2 DM 8/18/2021   • Diabetic ulcer of left heel associated with type 2 DM 7/6/2021   • Diabetic ulcer of right midfoot associated with type 2 DM 8/18/2021   • Difficulty walking    • Essential hypertension 8/31/2021   • Hammertoe    • Hyperlipidemia LDL goal <100 8/31/2021   • Ingrown toenail    • Obesity    • Paroxysmal atrial fibrillation 8/31/2021   • Polyneuropathy    • Pressure ulcer, stage 1    • Tinea unguium    • Type 2 diabetes mellitus with polyneuropathy      Past Surgical History:   Procedure Laterality Date   • CYST REMOVAL      center of back; benign   • INCISION AND DRAINAGE ABSCESS      back   • INCISION AND DRAINAGE LEG Right 12/10/2021    Procedure: INCISION AND DRAINAGE LOWER EXTREMITY;  Surgeon: Ash Leyva DPM;  Location: [facility];  Service: Podiatry;  Laterality: Right;   • OTHER SURGICAL HISTORY      Surgical clips left foot   • TOE SURGERY Right     Removal of 5th toe   • TRANS METATARSAL AMPUTATION Right 12/2/2021    Procedure: AMPUTATION TRANS METATARSAL;  Surgeon: Ash Leyva DPM;  Location: [facility];  Service: Podiatry;  Laterality: Right;   • WRIST SURGERY Left     repair of injury     PT Assessment (last 12 hours)     PT Evaluation and Treatment     Row Name 05/16/23 1449          Physical Therapy Time and Intention    Subjective Information no complaints (P)   -JF     Document Type evaluation (P)   -JF     Mode of Treatment individual  therapy;physical therapy (P)   -     Patient Effort adequate (P)   -     Symptoms Noted During/After Treatment increased pain (P)   increased pain in L leg  -[facility] Name 05/16/23 1449          General Information    Patient Profile Reviewed yes (P)   -     Patient Observations alert;cooperative;agree to therapy (P)   -     Prior Level of Function independent:;gait;transfer;ADL's;bed mobility (P)   -     Equipment Currently Used at Home walker, rolling (P)   -     Existing Precautions/Restrictions fall (P)   -     Barriers to Rehab none identified (P)   -[facility] Name 05/16/23 1449          Living Environment    Current Living Arrangements home (P)   -     Home Accessibility stairs to enter home (P)   -     People in Home alone (P)   -     Primary Care Provided by self (P)   -[facility] Name 05/16/23 1449          Home Main Entrance    Number of Stairs, Main Entrance two (P)   -[facility] Name 05/16/23 1449          Range of Motion (ROM)    Range of Motion ROM is WFL;bilateral lower extremities (P)   -[facility] Name 05/16/23 1449          Strength (Manual Muscle Testing)    Strength (Manual Muscle Testing) bilateral lower extremities (P)   RLE: grossly 3/5, LLE: grossly 1/5  -[facility] Name 05/16/23 1449          Mobility    Extremity Weight-bearing Status left lower extremity;right lower extremity (P)   -     Left Lower Extremity (Weight-bearing Status) non weight-bearing (NWB) (P)   -     Right Lower Extremity (Weight-bearing Status) non weight-bearing (NWB) (P)   -[facility] Name 05/16/23 1449          Bed Mobility    Bed Mobility bed mobility (all) activities (P)   -     All Activities, [place] (Bed Mobility) not tested (P)   Patient adamantly declined sitting EOB due to almost sliding out of bed yesterday. He said he did not feel safe sitting up right now.  -[facility] Name 05/16/23 1449          Transfers    Transfers other (see comments) (P)   Not tested - patient  currently NWB bilaterally  -     Row Name 05/16/23 1449          Safety Issues, Functional Mobility    Impairments Affecting Function (Mobility) balance;endurance/activity tolerance;strength (P)   -     Row Name 05/16/23 1449          Motor Skills    Therapeutic Exercise hip;knee;ankle (P)   -     Row Name 05/16/23 1449          Hip (Therapeutic Exercise)    Hip (Therapeutic Exercise) AROM (active range of motion);AAROM (active assistive range of motion) (P)   -     Hip AROM (Therapeutic Exercise) right;flexion;supine;20 repititions (P)   -     Hip AAROM (Therapeutic Exercise) left;flexion;supine;10 repetitions (P)   -     Row Name 05/16/23 1449          Knee (Therapeutic Exercise)    Knee (Therapeutic Exercise) AROM (active range of motion) (P)   -     Knee AROM (Therapeutic Exercise) bilateral;SAQ (short arc quad);supine;20 repititions (P)   -     Row Name 05/16/23 1449          Ankle (Therapeutic Exercise)    Ankle (Therapeutic Exercise) AROM (active range of motion) (P)   -     Ankle AROM (Therapeutic Exercise) right;dorsiflexion;plantarflexion;supine;20 repititions;2 sets (P)   -     Row Name             Wound 12/02/21 Right anterior foot Incision    Wound - Properties Group Placement Date: 12/02/21  -ES Side: Right  -ES Orientation: anterior  -ES Location: foot  -ES Primary Wound Type: Incision  -ES    Retired Wound - Properties Group Placement Date: 12/02/21  -ES Side: Right  -ES Orientation: anterior  -ES Location: foot  -ES Primary Wound Type: Incision  -ES    Retired Wound - Properties Group Date first assessed: 12/02/21  -ES Side: Right  -ES Location: foot  -ES Primary Wound Type: Incision  -ES    Row Name             Wound 06/22/21 1133 Left lateral heel    Wound - Properties Group Placement Date: 06/22/21  -FABIOLA Placement Time: 1133  -FABIOLA Present on Hospital Admission: Y  -FABIOLA Side: Left  -FABIOLA Orientation: lateral  -FABIOLA Location: heel  -FABIOLA    Retired Wound - Properties Group Placement  Date: 06/22/21  -FABIOLA Placement Time: 1133  -FABIOLA Present on Hospital Admission: Y  -FABIOLA Side: Left  -FABIOLA Orientation: lateral  -FABIOLA Location: heel  -FABIOLA    Retired Wound - Properties Group Date first assessed: 06/22/21  -FABIOLA Time first assessed: 1133  -FABIOLA Present on Hospital Admission: Y  -FABIOLA Side: Left  -FABIOLA Location: heel  -FABIOLA    Row Name 05/16/23 1449          Plan of Care Review    Plan of Care Reviewed With patient (P)   -     Outcome Evaluation Patient presents with limitations including balance, endurance, strength, and mobility. Patient expressed not wanting to sit on the side of the bed today due to fear of falling. He will benefit from physical therapy services while in the hospital. Upon discharge, it is recommended he goes to a rehab facility. (P)   -     Row Name 05/16/23 1449          Therapy Assessment/Plan (PT)    Rehab Potential (PT) fair, will monitor progress closely (P)   -     Criteria for Skilled Interventions Met (PT) skilled treatment is necessary (P)   -     Therapy Frequency (PT) daily (P)   -JF     Predicted Duration of Therapy Intervention (PT) 10 days (P)   -JF     Problem List (PT) problems related to;balance;mobility;strength (P)   -JF     Activity Limitations Related to Problem List (PT) unable to transfer safely (P)   -     Row Name 05/16/23 1449          PT Evaluation Complexity    History, PT Evaluation Complexity 1-2 personal factors and/or comorbidities (P)   -JF     Examination of Body Systems (PT Eval Complexity) total of 4 or more elements (P)   -JF     Clinical Presentation (PT Evaluation Complexity) stable (P)   -JF     Clinical Decision Making (PT Evaluation Complexity) low complexity (P)   -JF     Overall Complexity (PT Evaluation Complexity) low complexity (P)   -     Row Name 05/16/23 1449          Therapy Plan Review/Discharge Plan (PT)    Therapy Plan Review (PT) evaluation/treatment results reviewed;patient (P)   -     Row Name 05/16/23 1449          Physical  Therapy Goals    Bed Mobility Goal Selection (PT) bed mobility, PT goal 1 (P)   -     Transfer Goal Selection (PT) transfer, PT goal 1 (P)   -     Row Name 05/16/23 1449          Bed Mobility Goal 1 (PT)    Activity/Assistive Device (Bed Mobility Goal 1, PT) bed mobility activities, all (P)   -     [place] Level/Cues Needed (Bed Mobility Goal 1, PT) minimum assist (75% or more patient effort) (P)   -     Time Frame (Bed Mobility Goal 1, PT) 10 days (P)   -     Row Name 05/16/23 1449          Transfer Goal 1 (PT)    Activity/Assistive Device (Transfer Goal 1, PT) sit-to-stand/stand-to-sit;bed-to-chair/chair-to-bed;walker, rolling (P)   -     [place] Level/Cues Needed (Transfer Goal 1, PT) minimum assist (75% or more patient effort) (P)   -     Time Frame (Transfer Goal 1, PT) 10 days (P)   -           User Key  (r) = Recorded By, (t) = Taken By, (c) = Cosigned By    Initials Name Provider Type    Marlen Vaughn, RN Registered Nurse    Katlyn Garcia RN Registered Nurse    Corey Swanson, PT Student PT Student                  PT Recommendation and Plan  Anticipated Discharge Disposition (PT): (P) inpatient rehabilitation facility  Planned Therapy Interventions (PT): (P) balance training, bed mobility training, gait training, neuromuscular re-education, motor coordination training, strengthening, transfer training  Therapy Frequency (PT): (P) daily  Plan of Care Reviewed With: (P) patient  Outcome Evaluation: (P) Patient presents with limitations including balance, endurance, strength, and mobility. Patient expressed not wanting to sit on the side of the bed today due to fear of falling. He will benefit from physical therapy services while in the hospital. Upon discharge, it is recommended he goes to a rehab facility.   Outcome Measures     Row Name 05/16/23 1400             How much help from another person do you currently need...    Turning from your back to your side while in  flat bed without using bedrails? 3 (P)   -JF      Moving from lying on back to sitting on the side of a flat bed without bedrails? 2 (P)   -JF      Moving to and from a bed to a chair (including a wheelchair)? 2 (P)   -JF      Standing up from a chair using your arms (e.g., wheelchair, bedside chair)? 1 (P)   -JF      Climbing 3-5 steps with a railing? 1 (P)   -JF      To walk in hospital room? 1 (P)   -JF      AM-PAC 6 Clicks Score (PT) 10 (P)   -JF         Functional Assessment    Outcome Measure Options AM-PAC 6 Clicks Basic Mobility (PT) (P)   -JF            User Key  (r) = Recorded By, (t) = Taken By, (c) = Cosigned By    Initials Name Provider Type    Corey Swanson, PT Student PT Student                 Time Calculation:    PT Charges     Row Name 05/16/23 1459             Time Calculation    PT Received On 05/16/23 (P)   -JF      PT Goal Re-Cert Due Date 05/25/23 (P)   -JF         Timed Charges    57247 - PT Therapeutic Exercise Minutes 10 (P)   -JF         Untimed Charges    PT Eval/Re-eval Minutes 25 (P)   -JF         Total Minutes    Timed Charges Total Minutes 10 (P)   -JF      Untimed Charges Total Minutes 25 (P)   -JF       Total Minutes 35 (P)   -JF            User Key  (r) = Recorded By, (t) = Taken By, (c) = Cosigned By    Initials Name Provider Type    Corey Swanson, PT Student PT Student              Therapy Charges for Today     Code Description Service Date Service Provider Modifiers Qty    48139017759 HC PT THER PROC EA 15 MIN 5/16/2023 Corey Martinez, PT Student GP 1    51452999307 HC PT EVAL LOW COMPLEXITY 2 5/16/2023 Corey Martinez, PT Student GP 1          PT G-Codes  Outcome Measure Options: (P) AM-PAC 6 Clicks Basic Mobility (PT)  AM-PAC 6 Clicks Score (PT): (P) 10  AM-PAC 6 Clicks Score (OT): 14    Corey Martinez PT Student  5/16/2023

## 2023-05-16 NOTE — PLAN OF CARE
Problem: Adult Inpatient Plan of Care  Goal: Plan of Care Review  Outcome: Ongoing, Progressing  Flowsheets (Taken 5/16/2023 1712)  Progress: improving  Plan of Care Reviewed With: patient  Outcome Evaluation: Patient AAO throughout shift. VSS. Patient is on RA. Pain treated per MAR. Abx administered per MAR. Wound care completed per order. 1,500mL FR. Patient HR jumped to 150-160s not sustained, MD notified, PRN lopressor ordered. Continuing with plan of care.   Goal Outcome Evaluation:  Plan of Care Reviewed With: patient        Progress: improving  Outcome Evaluation: Patient AAO throughout shift. VSS. Patient is on RA. Pain treated per MAR. Abx administered per MAR. Wound care completed per order. 1,500mL FR. Patient HR jumped to 150-160s not sustained, MD notified, PRN lopressor ordered. Continuing with plan of care.

## 2023-05-16 NOTE — PLAN OF CARE
Goal Outcome Evaluation:  Plan of Care Reviewed With: (P) patient           Outcome Evaluation: (P) Patient presents with limitations including balance, endurance, strength, and mobility. Patient expressed not wanting to sit on the side of the bed today due to fear of falling. He will benefit from physical therapy services while in the hospital. Upon discharge, it is recommended he goes to a rehab facility.

## 2023-05-16 NOTE — PLAN OF CARE
Goal Outcome Evaluation:           Progress: no change    VSS this shift with no significant changes to report. Will continue POC

## 2023-05-16 NOTE — PROGRESS NOTES
[facility]   Hospitalist Progress Note  Date: 2023  Patient Name: Jose Shaikh  : 1960  MRN: [id number]  Date of admission: 2023      Subjective   Subjective     Chief Complaint: Left hip pain    Summary:   Jose Shaikh is a 62 y.o. male past medical history of osteomyelitis, type 2 diabetes, hypertension, A-fib, dyslipidemia, obesity with BMI of 48, who was recently hospital for osteomyelitis and he returns due to hip pain.        Patient was recently admitted to the hospital in early April for foot infection.  There was recommendation for amputation due to osteomyelitis in the foot and the patient refused.  He was unable to be placed at that time.  He is a sex offender making it very difficult for placement although [facility] was an option.  As result, he opted to go home on oral antibiotics to treat Alcaligenes facialis and Morganella morganii with doxycycline and ciprofloxacin.  The patient was seen in wound care in middle of April and at that time Dr. Daniels attempted to switch antibiotics to Levaquin per pharmacy recommendations based off culture data but was unable to get hold the patient so he was never switched.  The patient states the only reason he came to the emergency department was because his left hip was hurting.  Unaware of fevers.  Chills.  Cough.  Shortness of breath.  The patient came to the ER for hip pain.        In the emergency department the patient's vital signs are as follows temperature is 103.4, pulse is 139, blood pressure is 92/65, 95% on room air.  CBC shows a white blood cell count of 20.55 with neutrophils of 88.2.  CRP is 20.53, which is up from 7.4 in April and lactate is 2.6.  Sed rate was 42 in April and is currently 75.  Bicarb is 19.9 and sodium is 130.  Urinalysis shows positive nitrite and small leuk esterase but no bacteria.  Patient also seems to have a pneumonia in his right upper lobe.  X-ray of the right foot shows evidence of  osteomyelitis involving the distal aspect of the cuboid and gas in the soft tissue along the plantar aspect of the foot just proximal amputation site and periostitis involving the cuneiform.  Patient got 1 L of normal saline.  Patient received Zosyn and vancomycin for antibiotics.  Podiatry was consulted and did not feel like the foot was the source.  Patient will be admitted to the hospital for severe sepsis with unclear source at this time.  The source could be pneumonia, urinary tract infection or most likely the foot.  Patient will also be managed with A-fib with RVR.  Patient blood culture grew Streptococcus agalactiae.  Zyvox DC'd.  Patient does not want any amputation..  Patient wound culture from right foot also grew Streptococcus.  X-ray of pelvis and hip shows DJD of bilateral hips left worse than right.    Interval Followup:   A-fib rate mostly controlled, episode of RVR up to 150 resolves spontaneously, off cardizem drip.  bp  soft   No fever.  Remains on room air with 91% saturation  Denies any palpitations or chest pain.  Itching of the eyes better  Does have cough with mucus.  And it makes his hip hurt  Continues complaining of pain in left hip more than right and asking for pain medications.  Patient has bad arthritis and oral pain medication does not help much.  IV morphine helps most but does not last long.  Having difficulty sitting at the side of the bed.    Review of Systems   All systems were reviewed and negative except for: Summary and interval follow-up    Objective   Objective     Vitals:   Temp:  [97.3 °F (36.3 °C)-98.4 °F (36.9 °C)] 98.1 °F (36.7 °C)  Heart Rate:  [] 66  Resp:  [18] 18  BP: ()/(56-77) 112/75  Physical Exam      Constitutional: Awake, alert, no acute distress              Eyes: Pupils equal, sclerae anicteric, no conjunctival injection              HENT: NCAT, mucous membranes moist              Neck: Supple, no thyromegaly, no lymphadenopathy, trachea  midline              Respiratory coarse breath sounds.  Body habitus makes it difficult to get a good lung exam              Cardiovascular: Irregularly irregular, no murmurs, rubs, or gallops, palpable pedal pulses bilaterally              Gastrointestinal: Positive bowel sounds, soft, nontender, nondistended              Musculoskeletal: +1 bilateral ankle edema, no clubbing or cyanosis to extremities              Psychiatric: Appropriate affect, cooperative              Neurologic: Oriented x 3, strength symmetric in all extremities, Cranial Nerves grossly intact to confrontation, speech clear              Skin: No rashes.  Right foot shows amputation transmetatarsal.  There is a dehisced wound there is area of warmth and redness.  Right foot is  dressed.       Result Review    Result Review:  I have personally reviewed the results for the past 24 hours and agree with these findings:  [x]  Laboratory  [x]  Microbiology  [x]  Radiology  [x]  EKG/Telemetry A-fib 140  []  Cardiology/Vascular   []  Pathology  [x]  Old records  [x]  Other: Medications    Assessment & Plan   Assessment / Plan     Assessment:  Sepsis present on admission.  Patient has fever, tachycardia, lactic acidosis, elevated procalcitonin and leukocytosis.  Improving  Right upper lobe healthcare associated pneumonia .  Streptococcus agalactiae bacteremia.  Subsequent cultures negative  Chronic osteomyelitis of right foot involving cuboid and may be cuneiform.  Cellulitis of right foot due to Streptococcus agalactiae.  Paroxysmal A-fib with intermittent RVR on Eliquis.  Bilateral diabetic foot ulcers.  NIDDM.  Morbid obesity BMI of 48.1.  S/p TMA of right foot.  Hyponatremia.  Likely from hyperglycemia/volume overload.  Clinically significant.  Improving  Hypophosphatemia.  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    Plan:  MRI of left hip.  X-ray showed arthritis  Fluid restriction for hyponatremia  urine sodium.  Antibiotics  changed to IV Rocephin  Check wound culture.  Await final culture data   Appreciate podiatry input.  Per podiatry right foot wound not source of infection.  Patient does not want below-knee amputation.  Resume midodrine  Started scheduled p.o. digoxin  Continue long-acting p.o. Cardizem.    Resumed home metoprolol.  Dose doubled  IV and oral narcotics for pain control.  As needed Toradol started on Lyrica.  Finished allergy eyedrops for 2 days   MRSA PCR.,  Strep pneumonia and Legionella antigen negative  Sliding-scale insulin and carb consistent diet.  Wound nurse to evaluate patient.  PT OT.  Continue telemetry    Discussed plan with RN.  Return home when stable likely in the next day or so  Patient open to the idea of rehab placement but due to past history has difficulty in placement.    DVT prophylaxis:  Medical DVT prophylaxis orders are present.  Home Eliquis    CODE STATUS:   Level Of Support Discussed With: Patient  Code Status (Patient has no pulse and is not breathing): CPR (Attempt to Resuscitate)  Medical Interventions (Patient has pulse or is breathing): Full Support      Part of this note may be an electronic transcription/translation of spoken language to printed text using the Dragon Dictation System.         Electronically signed by Shekhar Au MD, 05/16/23, 5:19 PM EDT.  .

## 2023-05-17 ENCOUNTER — TELEPHONE (OUTPATIENT)
Dept: INTERNAL MEDICINE | Facility: CLINIC | Age: 63
End: 2023-05-17

## 2023-05-17 LAB
ALBUMIN SERPL-MCNC: 2.2 G/DL (ref 3.5–5.2)
ANION GAP SERPL CALCULATED.3IONS-SCNC: 5.8 MMOL/L (ref 5–15)
BUN SERPL-MCNC: 12 MG/DL (ref 8–23)
BUN/CREAT SERPL: 20.7 (ref 7–25)
CALCIUM SPEC-SCNC: 8.5 MG/DL (ref 8.6–10.5)
CHLORIDE SERPL-SCNC: 100 MMOL/L (ref 98–107)
CO2 SERPL-SCNC: 28.2 MMOL/L (ref 22–29)
CREAT SERPL-MCNC: 0.58 MG/DL (ref 0.76–1.27)
EGFRCR SERPLBLD CKD-EPI 2021: 108.9 ML/MIN/1.73
GLUCOSE BLDC GLUCOMTR-MCNC: 235 MG/DL (ref 70–99)
GLUCOSE BLDC GLUCOMTR-MCNC: 246 MG/DL (ref 70–99)
GLUCOSE BLDC GLUCOMTR-MCNC: 250 MG/DL (ref 70–99)
GLUCOSE BLDC GLUCOMTR-MCNC: 273 MG/DL (ref 70–99)
GLUCOSE BLDC GLUCOMTR-MCNC: 278 MG/DL (ref 70–99)
GLUCOSE SERPL-MCNC: 284 MG/DL (ref 65–99)
PHOSPHATE SERPL-MCNC: 2.9 MG/DL (ref 2.5–4.5)
POTASSIUM SERPL-SCNC: 4.9 MMOL/L (ref 3.5–5.2)
SODIUM SERPL-SCNC: 134 MMOL/L (ref 136–145)

## 2023-05-17 PROCEDURE — 99233 SBSQ HOSP IP/OBS HIGH 50: CPT | Performed by: INTERNAL MEDICINE

## 2023-05-17 PROCEDURE — 80069 RENAL FUNCTION PANEL: CPT | Performed by: INTERNAL MEDICINE

## 2023-05-17 PROCEDURE — 25010000002 MORPHINE PER 10 MG: Performed by: INTERNAL MEDICINE

## 2023-05-17 PROCEDURE — 63710000001 INSULIN DETEMIR PER 5 UNITS: Performed by: INTERNAL MEDICINE

## 2023-05-17 PROCEDURE — 94799 UNLISTED PULMONARY SVC/PX: CPT

## 2023-05-17 PROCEDURE — 82948 REAGENT STRIP/BLOOD GLUCOSE: CPT

## 2023-05-17 PROCEDURE — 63710000001 INSULIN LISPRO (HUMAN) PER 5 UNITS

## 2023-05-17 RX ORDER — AMOXICILLIN 500 MG/1
1000 CAPSULE ORAL EVERY 8 HOURS SCHEDULED
Status: DISCONTINUED | OUTPATIENT
Start: 2023-05-17 | End: 2023-06-15 | Stop reason: HOSPADM

## 2023-05-17 RX ADMIN — ACETAMINOPHEN 1000 MG: 325 TABLET ORAL at 05:23

## 2023-05-17 RX ADMIN — DIGOXIN 125 MCG: 125 TABLET ORAL at 13:02

## 2023-05-17 RX ADMIN — INSULIN DETEMIR 15 UNITS: 100 INJECTION, SOLUTION SUBCUTANEOUS at 10:28

## 2023-05-17 RX ADMIN — INSULIN LISPRO 4 UNITS: 100 INJECTION, SOLUTION INTRAVENOUS; SUBCUTANEOUS at 18:07

## 2023-05-17 RX ADMIN — APIXABAN 5 MG: 5 TABLET, FILM COATED ORAL at 10:28

## 2023-05-17 RX ADMIN — DILTIAZEM HYDROCHLORIDE 120 MG: 120 CAPSULE, COATED, EXTENDED RELEASE ORAL at 10:27

## 2023-05-17 RX ADMIN — PREGABALIN 25 MG: 25 CAPSULE ORAL at 22:32

## 2023-05-17 RX ADMIN — AMOXICILLIN 1000 MG: 500 CAPSULE ORAL at 22:58

## 2023-05-17 RX ADMIN — INSULIN DETEMIR 17 UNITS: 100 INJECTION, SOLUTION SUBCUTANEOUS at 20:42

## 2023-05-17 RX ADMIN — MIDODRINE HYDROCHLORIDE 5 MG: 5 TABLET ORAL at 10:26

## 2023-05-17 RX ADMIN — ATORVASTATIN CALCIUM 10 MG: 10 TABLET, FILM COATED ORAL at 20:43

## 2023-05-17 RX ADMIN — METOPROLOL SUCCINATE 50 MG: 50 TABLET, EXTENDED RELEASE ORAL at 20:42

## 2023-05-17 RX ADMIN — OXYCODONE HYDROCHLORIDE 10 MG: 5 TABLET ORAL at 16:29

## 2023-05-17 RX ADMIN — INSULIN LISPRO 3 UNITS: 100 INJECTION, SOLUTION INTRAVENOUS; SUBCUTANEOUS at 13:01

## 2023-05-17 RX ADMIN — PREGABALIN 25 MG: 25 CAPSULE ORAL at 05:23

## 2023-05-17 RX ADMIN — INSULIN LISPRO 4 UNITS: 100 INJECTION, SOLUTION INTRAVENOUS; SUBCUTANEOUS at 20:42

## 2023-05-17 RX ADMIN — AMOXICILLIN 1000 MG: 500 CAPSULE ORAL at 16:29

## 2023-05-17 RX ADMIN — Medication 10 ML: at 20:43

## 2023-05-17 RX ADMIN — MORPHINE SULFATE 2 MG: 2 INJECTION, SOLUTION INTRAMUSCULAR; INTRAVENOUS at 20:41

## 2023-05-17 RX ADMIN — PREGABALIN 25 MG: 25 CAPSULE ORAL at 16:00

## 2023-05-17 RX ADMIN — INSULIN LISPRO 3 UNITS: 100 INJECTION, SOLUTION INTRAVENOUS; SUBCUTANEOUS at 10:29

## 2023-05-17 RX ADMIN — FAMOTIDINE 40 MG: 20 TABLET ORAL at 10:26

## 2023-05-17 RX ADMIN — MIDODRINE HYDROCHLORIDE 5 MG: 5 TABLET ORAL at 18:06

## 2023-05-17 RX ADMIN — ACETAMINOPHEN 1000 MG: 325 TABLET ORAL at 16:00

## 2023-05-17 RX ADMIN — APIXABAN 5 MG: 5 TABLET, FILM COATED ORAL at 20:42

## 2023-05-17 RX ADMIN — OXYCODONE HYDROCHLORIDE 10 MG: 5 TABLET ORAL at 10:28

## 2023-05-17 RX ADMIN — OXYCODONE HYDROCHLORIDE 10 MG: 5 TABLET ORAL at 22:32

## 2023-05-17 RX ADMIN — MORPHINE SULFATE 2 MG: 2 INJECTION, SOLUTION INTRAMUSCULAR; INTRAVENOUS at 05:23

## 2023-05-17 RX ADMIN — MIDODRINE HYDROCHLORIDE 5 MG: 5 TABLET ORAL at 13:02

## 2023-05-17 RX ADMIN — MORPHINE SULFATE 2 MG: 2 INJECTION, SOLUTION INTRAMUSCULAR; INTRAVENOUS at 00:33

## 2023-05-17 RX ADMIN — ACETAMINOPHEN 1000 MG: 325 TABLET ORAL at 22:32

## 2023-05-17 RX ADMIN — METOPROLOL SUCCINATE 50 MG: 50 TABLET, EXTENDED RELEASE ORAL at 10:27

## 2023-05-17 NOTE — PROGRESS NOTES
[facility]   Hospitalist Progress Note  Date: 2023  Patient Name: Jose Shaikh  : 1960  MRN: [id number]  Date of admission: 2023      Subjective   Subjective     Chief Complaint: Left hip pain    Summary:   Jose Shaikh is a 62 y.o. male past medical history of osteomyelitis, type 2 diabetes, hypertension, A-fib, dyslipidemia, obesity with BMI of 48, who was recently hospital for osteomyelitis and he returns due to hip pain.        Patient was recently admitted to the hospital in early April for foot infection.  There was recommendation for amputation due to osteomyelitis in the foot and the patient refused.  He was unable to be placed at that time.  He is a sex offender making it very difficult for placement although [facility] was an option.  As result, he opted to go home on oral antibiotics to treat Alcaligenes facialis and Morganella morganii with doxycycline and ciprofloxacin.  The patient was seen in wound care in middle of April and at that time Dr. Daniels attempted to switch antibiotics to Levaquin per pharmacy recommendations based off culture data but was unable to get hold the patient so he was never switched.  The patient states the only reason he came to the emergency department was because his left hip was hurting.  Unaware of fevers.  Chills.  Cough.  Shortness of breath.  The patient came to the ER for hip pain.        In the emergency department the patient's vital signs are as follows temperature is 103.4, pulse is 139, blood pressure is 92/65, 95% on room air.  CBC shows a white blood cell count of 20.55 with neutrophils of 88.2.  CRP is 20.53, which is up from 7.4 in April and lactate is 2.6.  Sed rate was 42 in April and is currently 75.  Bicarb is 19.9 and sodium is 130.  Urinalysis shows positive nitrite and small leuk esterase but no bacteria.  Patient also seems to have a pneumonia in his right upper lobe.  X-ray of the right foot shows evidence of  osteomyelitis involving the distal aspect of the cuboid and gas in the soft tissue along the plantar aspect of the foot just proximal amputation site and periostitis involving the cuneiform.  Patient got 1 L of normal saline.  Patient received Zosyn and vancomycin for antibiotics.  Podiatry was consulted and did not feel like the foot was the source.  Patient will be admitted to the hospital for severe sepsis with unclear source at this time.  The source could be pneumonia, urinary tract infection or most likely the foot.  Patient will also be managed with A-fib with RVR.  Patient blood culture grew Streptococcus agalactiae.  Zyvox DC'd.  Patient does not want any amputation..  Patient wound culture from right foot also grew Streptococcus.  X-ray of pelvis and hip shows DJD of bilateral hips left worse than right.  Patient refused MRI of the left hip and order was canceled as patient is very claustrophobic.  Per ID recommendation needs 6 weeks of oral amoxicillin for osteomyelitis of right foot.    Interval Followup:   A-fib rate mostly controlled, episode of RVR up to 150 resolves spontaneously, off cardizem drip.  bp better with midodrine  No fever.  Remains on room air with 91% saturation  Denies any palpitations or chest pain.  Itching of the eyes better  Does have cough with mucus.  And it makes his hip hurt  Continues complaining of pain in left hip more than right and asking for pain medications.  Patient has bad arthritis and oral pain medication does not help much.  IV morphine helps most but does not last long.  Having difficulty sitting at the side of the bed.    Review of Systems   All systems were reviewed and negative except for: Summary and interval follow-up    Objective   Objective     Vitals:   Temp:  [97.8 °F (36.6 °C)-98.3 °F (36.8 °C)] 97.9 °F (36.6 °C)  Heart Rate:  [66-97] 83  Resp:  [18-20] 18  BP: (103-127)/(43-86) 120/43  Physical Exam      Constitutional: Awake, alert, no acute distress               Eyes: Pupils equal, sclerae anicteric, no conjunctival injection              HENT: NCAT, mucous membranes moist              Neck: Supple, no thyromegaly, no lymphadenopathy, trachea midline              Respiratory coarse breath sounds.  Body habitus makes it difficult to get a good lung exam              Cardiovascular: Irregularly irregular, no murmurs, rubs, or gallops, palpable pedal pulses bilaterally              Gastrointestinal: Positive bowel sounds, soft, nontender, nondistended              Musculoskeletal: +1 bilateral ankle edema, no clubbing or cyanosis to extremities              Psychiatric: Appropriate affect, cooperative              Neurologic: Oriented x 3, strength symmetric in all extremities, Cranial Nerves grossly intact to confrontation, speech clear              Skin: No rashes.  Right foot shows amputation transmetatarsal.  There is a dehisced wound there is area of warmth and redness.  Right foot is  dressed.       Result Review    Result Review:  I have personally reviewed the results for the past 24 hours and agree with these findings:  [x]  Laboratory  [x]  Microbiology  [x]  Radiology  [x]  EKG/Telemetry A-fib 140  []  Cardiology/Vascular   []  Pathology  [x]  Old records  [x]  Other: Medications    Assessment & Plan   Assessment / Plan     Assessment:  Sepsis present on admission.  Patient has fever, tachycardia, lactic acidosis, elevated procalcitonin and leukocytosis.  Improving  Right upper lobe healthcare associated pneumonia .  Streptococcus agalactiae bacteremia.  Subsequent cultures negative  Chronic osteomyelitis of right foot involving cuboid and may be cuneiform.  On p.o. amoxicillin for 6 weeks  Cellulitis of right foot due to Streptococcus agalactiae.  Paroxysmal A-fib with intermittent RVR on Eliquis.  Bilateral diabetic foot ulcers.  NIDDM.  Morbid obesity BMI of 48.1.  S/p TMA of right foot.  Hyponatremia.  Likely from hyperglycemia/volume overload.   Clinically significant.  Improving  Hypophosphatemia.  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    Plan:  Patient refused MRI of left hip.  X-ray showed arthritis  Fluid restriction for hyponatremia.  Improved  Finished 6 days of Rocephin.  Antibiotics changed to p.o. amoxicillin for 6 weeks for osteomyelitis as per ID input.  Right foot is likely source of bacteremia as per ID.  Wound culture and blood culture noted  Appreciate podiatry input.  Per podiatry right foot wound not source of infection.  Patient does not want below-knee amputation.  Resumed midodrine  Started scheduled p.o. digoxin  Continue long-acting p.o. Cardizem.    Resumed home metoprolol.  Dose doubled  IV and oral narcotics for pain control.  Finished as needed Toradol.  Started on Lyrica.  Visine eyedrops   MRSA PCR.,  Strep pneumonia and Legionella antigen negative  Sliding-scale insulin and carb consistent diet.  Wound nurse to evaluate patient.  PT OT.   telemetry    Discussed plan with RN.  Awaiting input from [facility].  Discharge if accepted otherwise will go home with home health.  Patient is ready for discharge    DVT prophylaxis:  Medical DVT prophylaxis orders are present.  Home Eliquis    CODE STATUS:   Level Of Support Discussed With: Patient  Code Status (Patient has no pulse and is not breathing): CPR (Attempt to Resuscitate)  Medical Interventions (Patient has pulse or is breathing): Full Support      Part of this note may be an electronic transcription/translation of spoken language to printed text using the Dragon Dictation System.       Electronically signed by Shekhar Au MD, 05/17/23, 3:54 PM EDT.

## 2023-05-17 NOTE — PLAN OF CARE
Goal Outcome Evaluation:    Patient is alert and oriented x 4,still complaining of left hip pain throughout the shift, pain medication given as needed, wound dressing changed, no other complaint, no pertinent change in condition, to continue care plan.

## 2023-05-17 NOTE — TELEPHONE ENCOUNTER
Caller: Jose Shaikh    Relationship to patient: Self    Best call back number: [phone]    Patient is needing: PATIENT IS CURRENTLY ADMITTED IN THE HOSPITAL WITH PNEUMONIA.  PATIENT WILL BE GOING FOR PHYSICAL THERAPY AT [facility] ONCE HE IS DISCHARGED. PATIENT IS JUST CALLING TO ADVISE PROVIDER.

## 2023-05-18 LAB
ALBUMIN SERPL-MCNC: 2.3 G/DL (ref 3.5–5.2)
ANION GAP SERPL CALCULATED.3IONS-SCNC: 8 MMOL/L (ref 5–15)
BACTERIA SPEC AEROBE CULT: NORMAL
BACTERIA SPEC AEROBE CULT: NORMAL
BUN SERPL-MCNC: 11 MG/DL (ref 8–23)
BUN/CREAT SERPL: 18.6 (ref 7–25)
CALCIUM SPEC-SCNC: 8.1 MG/DL (ref 8.6–10.5)
CHLORIDE SERPL-SCNC: 97 MMOL/L (ref 98–107)
CO2 SERPL-SCNC: 27 MMOL/L (ref 22–29)
CREAT SERPL-MCNC: 0.59 MG/DL (ref 0.76–1.27)
EGFRCR SERPLBLD CKD-EPI 2021: 108.3 ML/MIN/1.73
GLUCOSE BLDC GLUCOMTR-MCNC: 210 MG/DL (ref 70–99)
GLUCOSE BLDC GLUCOMTR-MCNC: 236 MG/DL (ref 70–99)
GLUCOSE BLDC GLUCOMTR-MCNC: 263 MG/DL (ref 70–99)
GLUCOSE BLDC GLUCOMTR-MCNC: 292 MG/DL (ref 70–99)
GLUCOSE SERPL-MCNC: 298 MG/DL (ref 65–99)
PHOSPHATE SERPL-MCNC: 3.5 MG/DL (ref 2.5–4.5)
POTASSIUM SERPL-SCNC: 4.7 MMOL/L (ref 3.5–5.2)
SODIUM SERPL-SCNC: 132 MMOL/L (ref 136–145)

## 2023-05-18 PROCEDURE — 94799 UNLISTED PULMONARY SVC/PX: CPT

## 2023-05-18 PROCEDURE — 97530 THERAPEUTIC ACTIVITIES: CPT

## 2023-05-18 PROCEDURE — 63710000001 INSULIN LISPRO (HUMAN) PER 5 UNITS

## 2023-05-18 PROCEDURE — 82948 REAGENT STRIP/BLOOD GLUCOSE: CPT

## 2023-05-18 PROCEDURE — 99232 SBSQ HOSP IP/OBS MODERATE 35: CPT | Performed by: INTERNAL MEDICINE

## 2023-05-18 PROCEDURE — 63710000001 INSULIN DETEMIR PER 5 UNITS: Performed by: INTERNAL MEDICINE

## 2023-05-18 PROCEDURE — 80069 RENAL FUNCTION PANEL: CPT | Performed by: INTERNAL MEDICINE

## 2023-05-18 RX ADMIN — APIXABAN 5 MG: 5 TABLET, FILM COATED ORAL at 21:51

## 2023-05-18 RX ADMIN — INSULIN DETEMIR 17 UNITS: 100 INJECTION, SOLUTION SUBCUTANEOUS at 09:07

## 2023-05-18 RX ADMIN — DIGOXIN 125 MCG: 125 TABLET ORAL at 12:42

## 2023-05-18 RX ADMIN — HYDROCODONE BITARTRATE AND ACETAMINOPHEN 1 TABLET: 5; 325 TABLET ORAL at 09:07

## 2023-05-18 RX ADMIN — INSULIN LISPRO 3 UNITS: 100 INJECTION, SOLUTION INTRAVENOUS; SUBCUTANEOUS at 17:22

## 2023-05-18 RX ADMIN — OXYCODONE HYDROCHLORIDE 10 MG: 5 TABLET ORAL at 14:22

## 2023-05-18 RX ADMIN — DOCUSATE SODIUM 50MG AND SENNOSIDES 8.6MG 2 TABLET: 8.6; 5 TABLET, FILM COATED ORAL at 09:07

## 2023-05-18 RX ADMIN — APIXABAN 5 MG: 5 TABLET, FILM COATED ORAL at 09:07

## 2023-05-18 RX ADMIN — HYDROCODONE BITARTRATE AND ACETAMINOPHEN 1 TABLET: 5; 325 TABLET ORAL at 21:51

## 2023-05-18 RX ADMIN — Medication 10 ML: at 09:09

## 2023-05-18 RX ADMIN — AMOXICILLIN 1000 MG: 500 CAPSULE ORAL at 05:00

## 2023-05-18 RX ADMIN — Medication 10 ML: at 21:51

## 2023-05-18 RX ADMIN — INSULIN LISPRO 3 UNITS: 100 INJECTION, SOLUTION INTRAVENOUS; SUBCUTANEOUS at 09:09

## 2023-05-18 RX ADMIN — ACETAMINOPHEN 1000 MG: 325 TABLET ORAL at 05:00

## 2023-05-18 RX ADMIN — ACETAMINOPHEN 1000 MG: 325 TABLET ORAL at 21:51

## 2023-05-18 RX ADMIN — MIDODRINE HYDROCHLORIDE 5 MG: 5 TABLET ORAL at 17:22

## 2023-05-18 RX ADMIN — AMOXICILLIN 1000 MG: 500 CAPSULE ORAL at 21:51

## 2023-05-18 RX ADMIN — INSULIN LISPRO 4 UNITS: 100 INJECTION, SOLUTION INTRAVENOUS; SUBCUTANEOUS at 12:41

## 2023-05-18 RX ADMIN — INSULIN LISPRO 4 UNITS: 100 INJECTION, SOLUTION INTRAVENOUS; SUBCUTANEOUS at 21:51

## 2023-05-18 RX ADMIN — MIDODRINE HYDROCHLORIDE 5 MG: 5 TABLET ORAL at 09:07

## 2023-05-18 RX ADMIN — DILTIAZEM HYDROCHLORIDE 120 MG: 120 CAPSULE, COATED, EXTENDED RELEASE ORAL at 09:07

## 2023-05-18 RX ADMIN — INSULIN DETEMIR 17 UNITS: 100 INJECTION, SOLUTION SUBCUTANEOUS at 21:51

## 2023-05-18 RX ADMIN — AMOXICILLIN 1000 MG: 500 CAPSULE ORAL at 14:19

## 2023-05-18 RX ADMIN — ATORVASTATIN CALCIUM 10 MG: 10 TABLET, FILM COATED ORAL at 21:51

## 2023-05-18 RX ADMIN — PREGABALIN 25 MG: 25 CAPSULE ORAL at 14:17

## 2023-05-18 RX ADMIN — FAMOTIDINE 40 MG: 20 TABLET ORAL at 09:07

## 2023-05-18 RX ADMIN — OXYCODONE HYDROCHLORIDE 10 MG: 5 TABLET ORAL at 04:58

## 2023-05-18 RX ADMIN — METOPROLOL SUCCINATE 50 MG: 50 TABLET, EXTENDED RELEASE ORAL at 21:51

## 2023-05-18 RX ADMIN — PREGABALIN 25 MG: 25 CAPSULE ORAL at 21:51

## 2023-05-18 RX ADMIN — MIDODRINE HYDROCHLORIDE 5 MG: 5 TABLET ORAL at 12:42

## 2023-05-18 RX ADMIN — METOPROLOL SUCCINATE 50 MG: 50 TABLET, EXTENDED RELEASE ORAL at 09:07

## 2023-05-18 RX ADMIN — PREGABALIN 25 MG: 25 CAPSULE ORAL at 05:00

## 2023-05-18 RX ADMIN — DOCUSATE SODIUM 50MG AND SENNOSIDES 8.6MG 2 TABLET: 8.6; 5 TABLET, FILM COATED ORAL at 21:51

## 2023-05-18 NOTE — PLAN OF CARE
Problem: Adult Inpatient Plan of Care  Goal: Plan of Care Review  Outcome: Ongoing, Progressing  Flowsheets (Taken 5/18/2023 1632)  Progress: improving  Plan of Care Reviewed With: patient  Outcome Evaluation: Patient alert and oriented throughout shift. VSS. Pt continues on room air. Pain management and antibiotic therapy continued per MAR. Continuing with plan of care.   Goal Outcome Evaluation:  Plan of Care Reviewed With: patient        Progress: improving  Outcome Evaluation: Patient alert and oriented throughout shift. VSS. Pt continues on room air. Pain management and antibiotic therapy continued per MAR. Continuing with plan of care.

## 2023-05-18 NOTE — THERAPY TREATMENT NOTE
Acute Care - Physical Therapy Treatment Note  KEO Dominguez     Patient Name: Jose Shaikh  : 1960  MRN: [id number]  Today's Date: 2023      Visit Dx:     ICD-10-CM ICD-9-CM   1. Acute febrile illness  R50.9 780.60   2. Sepsis without acute organ dysfunction, due to unspecified organism  A41.9 038.9     995.91   3. Pneumonia of right upper lobe due to infectious organism  J18.9 486   4. Uncontrolled type 2 diabetes mellitus with hyperglycemia  E11.65 250.02   5. Atrial fibrillation with rapid ventricular response  I48.91 427.31   6. Acute osteomyelitis of right foot  M86.171 730.07   7. Decreased activities of daily living (ADL)  Z78.9 V49.89   8. Difficulty in walking  R26.2 719.7     Patient Active Problem List   Diagnosis   • Diabetic ulcer of left heel associated with type 2 DM   • Acute osteomyelitis of left calcaneus    • Diabetic ulcer of left heel associated with type 2 DM   • Diabetic ulcer of right midfoot associated with type 2 DM   • Paroxysmal atrial fibrillation   • Essential hypertension   • Hyperlipidemia LDL goal <100   • Cellulitis and abscess of foot   • High alkaline phosphatase level   • Osteomyelitis   • Onychomycosis   • Onychocryptosis   • Foot pain, bilateral   • Osteomyelitis of foot, right, acute   • Cellulitis of right foot   • Type 2 diabetes mellitus, with long-term current use of insulin   • Class 3 severe obesity due to excess calories with serious comorbidity and body mass index (BMI) of 45.0 to 49.9 in adult   • Anxiety disorder, unspecified   • Claustrophobia   • Dependence on wheelchair   • Depression, unspecified   • Long term (current) use of anticoagulants   • Long term (current) use of oral hypoglycemic drugs   • Wound of foot   • Non-prs chronic ulcer oth prt r foot limited to brkdwn skin   • Orthostatic hypotension   • Other chronic osteomyelitis, right ankle and foot   • Personal history of nicotine dependence   • Thrombocytopenia, unspecified   • Unspecified  open wound, right foot, initial encounter   • Diabetic foot infection   • Subacute osteomyelitis of right foot   • Right foot pain   • Sepsis     Past Medical History:   Diagnosis Date   • Absence of toe of right foot    • Acute osteomyelitis of left calcaneus  8/18/2021   • Anxiety and depression    • Arthritis    • Claustrophobia    • Corns and callus    • Diabetic ulcer of left heel associated with type 2 DM 8/18/2021   • Diabetic ulcer of left heel associated with type 2 DM 7/6/2021   • Diabetic ulcer of right midfoot associated with type 2 DM 8/18/2021   • Difficulty walking    • Essential hypertension 8/31/2021   • Hammertoe    • Hyperlipidemia LDL goal <100 8/31/2021   • Ingrown toenail    • Obesity    • Paroxysmal atrial fibrillation 8/31/2021   • Polyneuropathy    • Pressure ulcer, stage 1    • Tinea unguium    • Type 2 diabetes mellitus with polyneuropathy      Past Surgical History:   Procedure Laterality Date   • CYST REMOVAL      center of back; benign   • INCISION AND DRAINAGE ABSCESS      back   • INCISION AND DRAINAGE LEG Right 12/10/2021    Procedure: INCISION AND DRAINAGE LOWER EXTREMITY;  Surgeon: Ash Leyva DPM;  Location: [facility] (formerly [facility]);  Service: Podiatry;  Laterality: Right;   • OTHER SURGICAL HISTORY      Surgical clips left foot   • TOE SURGERY Right     Removal of 5th toe   • TRANS METATARSAL AMPUTATION Right 12/2/2021    Procedure: AMPUTATION TRANS METATARSAL;  Surgeon: Ash Leyva DPM;  Location: [facility] (formerly [facility]);  Service: Podiatry;  Laterality: Right;   • WRIST SURGERY Left     repair of injury     PT Assessment (last 12 hours)     PT Evaluation and Treatment     Row Name 05/18/23 1400          Physical Therapy Time and Intention    Subjective Information complains of;pain  Pt is very fearful of fllin  -DP     Document Type therapy note (daily note)  -DP     Mode of Treatment individual therapy;physical therapy  -DP     Patient Effort good  -DP     Row Name  05/18/23 1400          Mobility    Extremity Weight-bearing Status left lower extremity;right lower extremity  -DP     Left Lower Extremity (Weight-bearing Status) non weight-bearing (NWB)  -DP     Right Lower Extremity (Weight-bearing Status) non weight-bearing (NWB)  -DP     Row Name 05/18/23 1400          Bed Mobility    Bed Mobility supine-sit  -DP     Supine-Sit [place] (Bed Mobility) moderate assist (50% patient effort)  -DP     Assistive Device (Bed Mobility) bed rails;draw sheet  -DP     Comment, (Bed Mobility) Patient sat edge of the bed unsupported and participated in dynamic sitting activities x15 minutes.  Therapist left patient sitting on the edge of the bed for lunch.  Patient was not able to participate in sit to and from stand transfer as he is nonweightbearing in bilateral lower extremities.  -DP     Row Name             Wound 12/02/21 Right anterior foot Incision    Wound - Properties Group Placement Date: 12/02/21  -ES Side: Right  -ES Orientation: anterior  -ES Location: foot  -ES Primary Wound Type: Incision  -ES    Retired Wound - Properties Group Placement Date: 12/02/21  -ES Side: Right  -ES Orientation: anterior  -ES Location: foot  -ES Primary Wound Type: Incision  -ES    Retired Wound - Properties Group Date first assessed: 12/02/21  -ES Side: Right  -ES Location: foot  -ES Primary Wound Type: Incision  -ES    Row Name             Wound 06/22/21 1133 Left lateral heel    Wound - Properties Group Placement Date: 06/22/21  -FABIOLA Placement Time: 1133  -FABIOLA Present on Hospital Admission: Y  -FABIOLA Side: Left  -FABIOLA Orientation: lateral  -FABIOLA Location: heel  -FABIOLA    Retired Wound - Properties Group Placement Date: 06/22/21  -FABIOLA Placement Time: 1133  -FABIOLA Present on Hospital Admission: Y  -FABIOLA Side: Left  -FABIOLA Orientation: lateral  -FABIOLA Location: heel  -FABIOLA    Retired Wound - Properties Group Date first assessed: 06/22/21  -FABIOLA Time first assessed: 1133  -FABIOLA Present on Hospital Admission: Y  -FABIOLA Side: Left   - Location: heel  -FABIOLA    Row Name 05/18/23 1400          Progress Summary (PT)    Progress Toward Functional Goals (PT) progress toward functional goals is good  -DP     Daily Progress Summary (PT) Patient showed progress in assistance needed for supine to sit transfer.  He needed only moderate assistance today and he also showed progress with participation.  He was very fearful of falling as he had had a recent incident a few days ago.  Patient was not able to participate in sit to and from stand transfer due to being nonweightbearing in bilateral lower extremity, but was able to participate in dynamic sitting activities.  He was left sitting on the edge of the bed for lunch.  -DP           User Key  (r) = Recorded By, (t) = Taken By, (c) = Cosigned By    Initials Name Provider Type    Marlen Vaughn, RN Registered Nurse    Deedee Dove, PT Physical Therapist    Katlyn Garcia RN Registered Nurse                  PT Recommendation and Plan     Progress Summary (PT)  Progress Toward Functional Goals (PT): progress toward functional goals is good  Daily Progress Summary (PT): Patient showed progress in assistance needed for supine to sit transfer.  He needed only moderate assistance today and he also showed progress with participation.  He was very fearful of falling as he had had a recent incident a few days ago.  Patient was not able to participate in sit to and from stand transfer due to being nonweightbearing in bilateral lower extremity, but was able to participate in dynamic sitting activities.  He was left sitting on the edge of the bed for lunch.   Outcome Measures     Row Name 05/18/23 1400 05/16/23 1400          How much help from another person do you currently need...    Turning from your back to your side while in flat bed without using bedrails? 3  -DP 3  -MOE (r) AMARI (t) MOE (c)     Moving from lying on back to sitting on the side of a flat bed without bedrails? 2  -DP 2  -MOE (r) AMARI (t) MOE  (c)     Moving to and from a bed to a chair (including a wheelchair)? 2  -DP 2  -MOE (r) JF (t) MOE (c)     Standing up from a chair using your arms (e.g., wheelchair, bedside chair)? 2  -DP 1  -MOE (r) JF (t) MOE (c)     Climbing 3-5 steps with a railing? 1  -DP 1  -MOE (r) JF (t) MOE (c)     To walk in hospital room? 1  -DP 1  -MOE (r) JF (t) MOE (c)     AM-PAC 6 Clicks Score (PT) 11  -DP 10  -MOE (r) JF (t)        Functional Assessment    Outcome Measure Options AM-PAC 6 Clicks Basic Mobility (PT)  -DP AM-PAC 6 Clicks Basic Mobility (PT)  -MOE (r) JF (t) MOE (c)           User Key  (r) = Recorded By, (t) = Taken By, (c) = Cosigned By    Initials Name Provider Type    DP Deedee Landers, PT Physical Therapist    Merlin De La O, PT Physical Therapist    Corey Swanson, PT Student PT Student                 Time Calculation:    PT Charges     Row Name 05/18/23 1419             Time Calculation    PT Received On 05/18/23  -DP         Timed Charges    39965 - PT Therapeutic Activity Minutes 23  -DP         Total Minutes    Timed Charges Total Minutes 23  -DP       Total Minutes 23  -DP            User Key  (r) = Recorded By, (t) = Taken By, (c) = Cosigned By    Initials Name Provider Type    DP Deedee Landers, PT Physical Therapist              Therapy Charges for Today     Code Description Service Date Service Provider Modifiers Qty    [id number]  PT THERAPEUTIC ACT EA 15 MIN 5/18/2023 Deedee Landers, PT GP 2          PT G-Codes  Outcome Measure Options: AM-PAC 6 Clicks Basic Mobility (PT)  AM-PAC 6 Clicks Score (PT): 11  AM-PAC 6 Clicks Score (OT): 14    Deedee Landers, PT  5/18/2023

## 2023-05-18 NOTE — PROGRESS NOTES
[facility]   Hospitalist Progress Note  Date: 2023  Patient Name: Jose Shaikh  : 1960  MRN: [id number]  Date of admission: 2023      Subjective   Subjective     Chief Complaint: Left hip pain    Summary:   Jose Shaikh is a 62 y.o. male past medical history of osteomyelitis, type 2 diabetes, hypertension, A-fib, dyslipidemia, obesity with BMI of 48, who was recently hospital for osteomyelitis and he returns due to hip pain.        Patient was recently admitted to the hospital in early April for foot infection.  There was recommendation for amputation due to osteomyelitis in the foot and the patient refused.  He was unable to be placed at that time.  He is a sex offender making it very difficult for placement although [facility] was an option.  As result, he opted to go home on oral antibiotics to treat Alcaligenes facialis and Morganella morganii with doxycycline and ciprofloxacin.  The patient was seen in wound care in middle of April and at that time Dr. Daniels attempted to switch antibiotics to Levaquin per pharmacy recommendations based off culture data but was unable to get hold the patient so he was never switched.  The patient states the only reason he came to the emergency department was because his left hip was hurting.  Unaware of fevers.  Chills.  Cough.  Shortness of breath.  The patient came to the ER for hip pain.        In the emergency department the patient's vital signs are as follows temperature is 103.4, pulse is 139, blood pressure is 92/65, 95% on room air.  CBC shows a white blood cell count of 20.55 with neutrophils of 88.2.  CRP is 20.53, which is up from 7.4 in April and lactate is 2.6.  Sed rate was 42 in April and is currently 75.  Bicarb is 19.9 and sodium is 130.  Urinalysis shows positive nitrite and small leuk esterase but no bacteria.  Patient also seems to have a pneumonia in his right upper lobe.  X-ray of the right foot shows evidence of  osteomyelitis involving the distal aspect of the cuboid and gas in the soft tissue along the plantar aspect of the foot just proximal amputation site and periostitis involving the cuneiform.  Patient got 1 L of normal saline.  Patient received Zosyn and vancomycin for antibiotics.  Podiatry was consulted and did not feel like the foot was the source.  Patient will be admitted to the hospital for severe sepsis with unclear source at this time.  The source could be pneumonia, urinary tract infection or most likely the foot.  Patient will also be managed with A-fib with RVR.  Patient blood culture grew Streptococcus agalactiae.  Zyvox DC'd.  Patient does not want any amputation..  Patient wound culture from right foot also grew Streptococcus.  X-ray of pelvis and hip shows DJD of bilateral hips left worse than right.  Patient refused MRI of the left hip and order was canceled as patient is very claustrophobic.  Per ID recommendation needs 6 weeks of oral amoxicillin for osteomyelitis of right foot.    Interval Followup: Patient is without any complaints at the time of my evaluation.  Staff is at bedside assisting patient with bathing.    Review of Systems   All systems were reviewed and negative except for: Summary and interval follow-up    Objective   Objective     Vitals:   Temp:  [97.3 °F (36.3 °C)-98.6 °F (37 °C)] 97.3 °F (36.3 °C)  Heart Rate:  [63-83] 81  Resp:  [16-20] 16  BP: (109-133)/(43-87) 129/60  Physical Exam      Constitutional: Awake, alert, sitting up in the bed in no acute distress              Eyes: Pupils equal, sclerae anicteric, no conjunctival injection              HENT: NCAT, mucous membranes moist              Neck: Supple, no thyromegaly, no lymphadenopathy, trachea midline              Respiratory: coarse breath sounds.                Cardiovascular: Irregularly irregular, no murmurs, rubs, or gallops, palpable pedal pulses bilaterally              Gastrointestinal: Positive bowel sounds,  soft, nontender, nondistended              Musculoskeletal: +1 bilateral ankle edema, no clubbing or cyanosis to extremities              Psychiatric: Appropriate affect, cooperative              Neurologic: Oriented x 3, strength symmetric in all extremities, Cranial Nerves grossly intact to confrontation, speech clear              Skin: No rashes.  Right foot shows amputation transmetatarsal.  There is a dehisced wound there is area of warmth and redness.  Right foot is  dressed.       Result Review    Result Review:  I have personally reviewed the results for the past 24 hours and agree with these findings:  [x]  Laboratory  [x]  Microbiology  [x]  Radiology  [x]  EKG/Telemetry   []  Cardiology/Vascular   []  Pathology  [x]  Old records  [x]  Other: Medications    Assessment & Plan   Assessment / Plan     Assessment:  Sepsis present on admission.  Patient has fever, tachycardia, lactic acidosis, elevated procalcitonin and leukocytosis.  Improving  Right upper lobe healthcare associated pneumonia .  Streptococcus agalactiae bacteremia.  Subsequent cultures negative  Chronic osteomyelitis of right foot involving cuboid and may be cuneiform.  On p.o. amoxicillin for 6 weeks  Cellulitis of right foot due to Streptococcus agalactiae.  Paroxysmal A-fib with intermittent RVR on Eliquis.  Bilateral diabetic foot ulcers.  NIDDM.  Most recent A1c 6.6 in April 2023  Morbid obesity BMI of 48.1.  S/p TMA of right foot.  Hyponatremia.  likely from hyperglycemia/volume overload.  Clinically significant.  Improved  Hypophosphatemia.  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    Plan:  Patient refused MRI of left hip.  X-ray showed arthritis  Fluid restriction for hyponatremia.  Overall improved  Finished 6 days of Rocephin.  Antibiotics changed to p.o. amoxicillin for 6 weeks for osteomyelitis as per ID input.  Right foot is likely source of bacteremia as per ID.  Wound culture and blood culture noted.  Repeat  blood cultures final results with no growth.  Appreciate podiatry input.  Per podiatry right foot wound not source of infection.  Patient does not want below-knee amputation.  Resumed midodrine  Continue p.o. digoxin  Continue long-acting p.o. Cardizem.    Resumed home metoprolol.  Dose doubled  IV and oral narcotics for pain control.  Finished as needed Toradol.  Started on Lyrica.  Visine eyedrops   MRSA PCR.,  Strep pneumonia and Legionella antigen negative  Continue basal bolus insulin therapy.  Continue wound care recommendations.    Continue PT/OT  Telemetry      Awaiting decision from [facility].  Discharge if accepted otherwise will go home with home health.  Patient is ready for discharge    DVT prophylaxis:  Medical DVT prophylaxis orders are present.  Home Eliquis    CODE STATUS:   Level Of Support Discussed With: Patient  Code Status (Patient has no pulse and is not breathing): CPR (Attempt to Resuscitate)  Medical Interventions (Patient has pulse or is breathing): Full Support      Part of this note may be an electronic transcription/translation of spoken language to printed text using the Dragon Dictation System.       Electronically signed by Cayla Mcdonald MD, 05/18/23, 9:03 AM EDT.

## 2023-05-18 NOTE — PLAN OF CARE
Goal Outcome Evaluation:    Patient is alert and oriented x 4, on room air, still complaining of pain ,but it seems like he is tolerating it more than the previous days, pain medication given as needed. No other complain, no pertinent change in condition.patient refused to change his wound dressing because he is in pain,no sign and symptoms of distress, to continue care plan.

## 2023-05-19 ENCOUNTER — PATIENT ROUNDING (BHMG ONLY) (OUTPATIENT)
Dept: WOUND CARE | Facility: HOSPITAL | Age: 63
End: 2023-05-19
Payer: MEDICARE

## 2023-05-19 LAB
GLUCOSE BLDC GLUCOMTR-MCNC: 267 MG/DL (ref 70–99)
GLUCOSE BLDC GLUCOMTR-MCNC: 275 MG/DL (ref 70–99)
GLUCOSE BLDC GLUCOMTR-MCNC: 295 MG/DL (ref 70–99)
GLUCOSE BLDC GLUCOMTR-MCNC: 331 MG/DL (ref 70–99)

## 2023-05-19 PROCEDURE — 99231 SBSQ HOSP IP/OBS SF/LOW 25: CPT | Performed by: INTERNAL MEDICINE

## 2023-05-19 PROCEDURE — 63710000001 INSULIN LISPRO (HUMAN) PER 5 UNITS

## 2023-05-19 PROCEDURE — 94799 UNLISTED PULMONARY SVC/PX: CPT

## 2023-05-19 PROCEDURE — 63710000001 INSULIN DETEMIR PER 5 UNITS: Performed by: INTERNAL MEDICINE

## 2023-05-19 PROCEDURE — 82948 REAGENT STRIP/BLOOD GLUCOSE: CPT

## 2023-05-19 PROCEDURE — 97110 THERAPEUTIC EXERCISES: CPT

## 2023-05-19 RX ADMIN — OXYCODONE HYDROCHLORIDE 10 MG: 5 TABLET ORAL at 09:01

## 2023-05-19 RX ADMIN — OXYCODONE HYDROCHLORIDE 10 MG: 5 TABLET ORAL at 02:15

## 2023-05-19 RX ADMIN — ACETAMINOPHEN 1000 MG: 325 TABLET ORAL at 13:04

## 2023-05-19 RX ADMIN — DILTIAZEM HYDROCHLORIDE 120 MG: 120 CAPSULE, COATED, EXTENDED RELEASE ORAL at 08:55

## 2023-05-19 RX ADMIN — Medication 10 ML: at 20:11

## 2023-05-19 RX ADMIN — MIDODRINE HYDROCHLORIDE 5 MG: 5 TABLET ORAL at 08:54

## 2023-05-19 RX ADMIN — AMOXICILLIN 1000 MG: 500 CAPSULE ORAL at 13:04

## 2023-05-19 RX ADMIN — INSULIN LISPRO 4 UNITS: 100 INJECTION, SOLUTION INTRAVENOUS; SUBCUTANEOUS at 08:53

## 2023-05-19 RX ADMIN — OXYCODONE HYDROCHLORIDE 10 MG: 5 TABLET ORAL at 20:12

## 2023-05-19 RX ADMIN — METOPROLOL SUCCINATE 50 MG: 50 TABLET, EXTENDED RELEASE ORAL at 08:55

## 2023-05-19 RX ADMIN — AMOXICILLIN 1000 MG: 500 CAPSULE ORAL at 05:19

## 2023-05-19 RX ADMIN — PREGABALIN 25 MG: 25 CAPSULE ORAL at 13:04

## 2023-05-19 RX ADMIN — INSULIN LISPRO 4 UNITS: 100 INJECTION, SOLUTION INTRAVENOUS; SUBCUTANEOUS at 13:04

## 2023-05-19 RX ADMIN — MIDODRINE HYDROCHLORIDE 5 MG: 5 TABLET ORAL at 13:03

## 2023-05-19 RX ADMIN — METOPROLOL SUCCINATE 50 MG: 50 TABLET, EXTENDED RELEASE ORAL at 20:12

## 2023-05-19 RX ADMIN — ACETAMINOPHEN 1000 MG: 325 TABLET ORAL at 05:19

## 2023-05-19 RX ADMIN — APIXABAN 5 MG: 5 TABLET, FILM COATED ORAL at 20:13

## 2023-05-19 RX ADMIN — Medication 10 ML: at 08:55

## 2023-05-19 RX ADMIN — INSULIN LISPRO 5 UNITS: 100 INJECTION, SOLUTION INTRAVENOUS; SUBCUTANEOUS at 20:24

## 2023-05-19 RX ADMIN — ATORVASTATIN CALCIUM 10 MG: 10 TABLET, FILM COATED ORAL at 20:11

## 2023-05-19 RX ADMIN — DIGOXIN 125 MCG: 125 TABLET ORAL at 13:03

## 2023-05-19 RX ADMIN — MIDODRINE HYDROCHLORIDE 5 MG: 5 TABLET ORAL at 17:35

## 2023-05-19 RX ADMIN — HYDROCODONE BITARTRATE AND ACETAMINOPHEN 1 TABLET: 5; 325 TABLET ORAL at 04:23

## 2023-05-19 RX ADMIN — INSULIN LISPRO 4 UNITS: 100 INJECTION, SOLUTION INTRAVENOUS; SUBCUTANEOUS at 17:35

## 2023-05-19 RX ADMIN — DOCUSATE SODIUM 50MG AND SENNOSIDES 8.6MG 2 TABLET: 8.6; 5 TABLET, FILM COATED ORAL at 08:54

## 2023-05-19 RX ADMIN — FAMOTIDINE 40 MG: 20 TABLET ORAL at 08:55

## 2023-05-19 RX ADMIN — PREGABALIN 25 MG: 25 CAPSULE ORAL at 20:14

## 2023-05-19 RX ADMIN — APIXABAN 5 MG: 5 TABLET, FILM COATED ORAL at 08:55

## 2023-05-19 RX ADMIN — INSULIN DETEMIR 20 UNITS: 100 INJECTION, SOLUTION SUBCUTANEOUS at 20:15

## 2023-05-19 RX ADMIN — INSULIN DETEMIR 17 UNITS: 100 INJECTION, SOLUTION SUBCUTANEOUS at 08:54

## 2023-05-19 RX ADMIN — PREGABALIN 25 MG: 25 CAPSULE ORAL at 05:19

## 2023-05-19 NOTE — PLAN OF CARE
Problem: Adult Inpatient Plan of Care  Goal: Plan of Care Review  Outcome: Ongoing, Progressing  Flowsheets  Taken 5/19/2023 1440 by Aileen Pradhan, RN  Progress: no change  Outcome Evaluation: No acute changes throughout shift today, VSS, will continue to monitor.  Taken 5/18/2023 1632 by Rodolfo Lerner, RN  Plan of Care Reviewed With: patient   Goal Outcome Evaluation:           Progress: no change  Outcome Evaluation: No acute changes throughout shift today, VSS, will continue to monitor.

## 2023-05-19 NOTE — PLAN OF CARE
Goal Outcome Evaluation:  Plan of Care Reviewed With: patient           Outcome Evaluation: Pt transferred to 4NT this shift. Pt was A&Ox4 this shift. Also, was on room air maintaining stable oxygen saturation. Blood glucose readings were 267, 275, and 295. Slliding scale insulin administered, as per order, see MAR. Wound care completed by Wound/Ostomy RN this shift. Pt has denied pain since transfer to floor. Fluid restriction remains in place. All other vital signs remained stable.

## 2023-05-19 NOTE — PROGRESS NOTES
[facility]   Hospitalist Progress Note  Date: 2023  Patient Name: Jose Shaikh  : 1960  MRN: [id number]  Date of admission: 2023      Subjective   Subjective     Chief Complaint: Left hip pain    Summary:   Jose Shaikh is a 62 y.o. male past medical history of osteomyelitis, type 2 diabetes, hypertension, A-fib, dyslipidemia, obesity with BMI of 48, who was recently hospital for osteomyelitis and he returns due to hip pain.        Patient was recently admitted to the hospital in early April for foot infection.  There was recommendation for amputation due to osteomyelitis in the foot and the patient refused.  He was unable to be placed at that time.  He is a sex offender making it very difficult for placement although [facility] was an option.  As result, he opted to go home on oral antibiotics to treat Alcaligenes facialis and Morganella morganii with doxycycline and ciprofloxacin.  The patient was seen in wound care in middle of April and at that time Dr. Daniels attempted to switch antibiotics to Levaquin per pharmacy recommendations based off culture data but was unable to get hold the patient so he was never switched.  The patient states the only reason he came to the emergency department was because his left hip was hurting.  Unaware of fevers.  Chills.  Cough.  Shortness of breath.  The patient came to the ER for hip pain.        In the emergency department the patient's vital signs are as follows temperature is 103.4, pulse is 139, blood pressure is 92/65, 95% on room air.  CBC shows a white blood cell count of 20.55 with neutrophils of 88.2.  CRP is 20.53, which is up from 7.4 in April and lactate is 2.6.  Sed rate was 42 in April and is currently 75.  Bicarb is 19.9 and sodium is 130.  Urinalysis shows positive nitrite and small leuk esterase but no bacteria.  Patient also seems to have a pneumonia in his right upper lobe.  X-ray of the right foot shows evidence of  osteomyelitis involving the distal aspect of the cuboid and gas in the soft tissue along the plantar aspect of the foot just proximal amputation site and periostitis involving the cuneiform.  Patient got 1 L of normal saline.  Patient received Zosyn and vancomycin for antibiotics.  Podiatry was consulted and did not feel like the foot was the source.  Patient will be admitted to the hospital for severe sepsis with unclear source at this time.  The source could be pneumonia, urinary tract infection or most likely the foot.  Patient will also be managed with A-fib with RVR.  Patient blood culture grew Streptococcus agalactiae.  Zyvox DC'd.  Patient does not want any amputation..  Patient wound culture from right foot also grew Streptococcus.  X-ray of pelvis and hip shows DJD of bilateral hips left worse than right.  Patient refused MRI of the left hip and order was canceled as patient is very claustrophobic.  Per ID recommendation needs 6 weeks of oral amoxicillin for osteomyelitis of right foot.    Interval Followup: Patient is without any complaints at the time of my evaluation.  Staff is at bedside assisting patient with bathing.    Review of Systems   All systems were reviewed and negative except for: Summary and interval follow-up    Objective   Objective     Vitals:   Temp:  [97.2 °F (36.2 °C)-98.8 °F (37.1 °C)] 97.6 °F (36.4 °C)  Heart Rate:  [66-79] 70  Resp:  [18] 18  BP: (114-136)/(43-67) 124/53  Physical Exam      Constitutional: Awake, alert, sitting up in the bed in no acute distress              Eyes: Pupils equal, sclerae anicteric, no conjunctival injection              HENT: NCAT, mucous membranes moist              Neck: Supple, no thyromegaly, no lymphadenopathy, trachea midline              Respiratory: coarse breath sounds.                Cardiovascular: Irregularly irregular, no murmurs, rubs, or gallops, palpable pedal pulses bilaterally              Gastrointestinal: Positive bowel sounds,  soft, nontender, nondistended              Musculoskeletal: +1 bilateral ankle edema, no clubbing or cyanosis to extremities              Psychiatric: Appropriate affect, cooperative              Neurologic: Oriented x 3, strength symmetric in all extremities, Cranial Nerves grossly intact to confrontation, speech clear              Skin: No rashes.  Right foot shows amputation transmetatarsal.  There is a dehisced wound there is area of warmth and redness.  Right foot is  dressed.       Result Review    Result Review:  I have personally reviewed the results for the past 24 hours and agree with these findings:  [x]  Laboratory  [x]  Microbiology  [x]  Radiology  [x]  EKG/Telemetry   []  Cardiology/Vascular   []  Pathology  [x]  Old records  [x]  Other: Medications    Assessment & Plan   Assessment / Plan     Assessment:  Sepsis present on admission.  Patient has fever, tachycardia, lactic acidosis, elevated procalcitonin and leukocytosis.  Improving  Right upper lobe healthcare associated pneumonia .  Streptococcus agalactiae bacteremia.  Subsequent cultures negative  Chronic osteomyelitis of right foot involving cuboid and may be cuneiform.  On p.o. amoxicillin for 6 weeks  Cellulitis of right foot due to Streptococcus agalactiae.  Paroxysmal A-fib with intermittent RVR on Eliquis.  Bilateral diabetic foot ulcers.  NIDDM.  Most recent A1c 6.6 in April 2023  Morbid obesity BMI of 48.1.  S/p TMA of right foot.  Hyponatremia.  likely from hyperglycemia/volume overload.  Clinically significant.  Improved  Hypophosphatemia.  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    Plan:  Patient refused MRI of left hip.  X-ray showed arthritis  Continue to fluid restriction for hyponatremia.  Overall improved  Finished 6 days of Rocephin.  Antibiotics changed to p.o. amoxicillin for 6 weeks for osteomyelitis as per ID input.  Right foot is likely source of bacteremia as per ID.  Wound culture and blood culture  noted.  Repeat blood cultures final results with no growth.  Appreciate podiatry input.  Per podiatry right foot wound not source of infection.  Patient does not want below-knee amputation.  Resumed midodrine  Continue p.o. digoxin  Continue long-acting p.o. Cardizem.    Resumed home metoprolol at increased dose   IV and oral narcotics for pain control.  Finished as needed Toradol.  Started on Lyrica.  Visine eyedrops   MRSA PCR.,  Strep pneumonia and Legionella antigen negative  Continue basal bolus insulin therapy.  Although will increase Levemir to 20 units twice daily.   Previous home dose was 60 units of Levemir twice daily  Continue wound care recommendations.    Continue PT/OT  Telemetry    Unfortunately [facility] is unable to accept the patient for rehab purposes.  Case management is still working to find a facility which can accept the patient for rehab.  Patient is ready for discharge    DVT prophylaxis:  Medical DVT prophylaxis orders are present.  Home Eliquis    CODE STATUS:   Level Of Support Discussed With: Patient  Code Status (Patient has no pulse and is not breathing): CPR (Attempt to Resuscitate)  Medical Interventions (Patient has pulse or is breathing): Full Support      Part of this note may be an electronic transcription/translation of spoken language to printed text using the Dragon Dictation System.       Electronically signed by Cayla Mcdonald MD, 05/19/23, 2:48 PM EDT.

## 2023-05-19 NOTE — CONSULTS
"Nutrition Services    Patient Name: Jose Shaikh  YOB: 1960  MRN: [id number]  Admission date: 5/12/2023      CLINICAL NUTRITION ASSESSMENT      Reason for Assessment  LOS   H&P:    Past Medical History:   Diagnosis Date   • Absence of toe of right foot    • Acute osteomyelitis of left calcaneus  8/18/2021   • Anxiety and depression    • Arthritis    • Claustrophobia    • Corns and callus    • Diabetic ulcer of left heel associated with type 2 DM 8/18/2021   • Diabetic ulcer of left heel associated with type 2 DM 7/6/2021   • Diabetic ulcer of right midfoot associated with type 2 DM 8/18/2021   • Difficulty walking    • Essential hypertension 8/31/2021   • Hammertoe    • Hyperlipidemia LDL goal <100 8/31/2021   • Ingrown toenail    • Obesity    • Paroxysmal atrial fibrillation 8/31/2021   • Polyneuropathy    • Pressure ulcer, stage 1    • Tinea unguium    • Type 2 diabetes mellitus with polyneuropathy         Current Problems:   Active Hospital Problems    Diagnosis    • **Sepsis    • Right foot pain    • Osteomyelitis of foot, right, acute    • Wound of foot    • Diabetic ulcer of right midfoot associated with type 2 DM         Nutrition/Diet History         Narrative     Pt followed by RD for LOS x 7 days. Intake documentation reveal pt consuming 100% of meals. Weight stability. Pt is at low risk per nutrition risk screening. No acute nutrition concerns or interventions at this time. RD will continue to follow and monitor per protocol.     Anthropometrics        Current Height, Weight Height: 188 cm (74\")  Weight: (!) 168 kg (370 lb 14.4 oz)   Current BMI Body mass index is 47.62 kg/m².       Weight Hx  Wt Readings from Last 30 Encounters:   05/19/23 0300 (!) 168 kg (370 lb 14.4 oz)   05/18/23 1912 (!) 168 kg (371 lb 7.6 oz)   05/18/23 0600 (!) 169 kg (371 lb 11.1 oz)   05/16/23 0700 (!) 171 kg (377 lb 4.8 oz)   05/14/23 0500 (!) 171 kg (377 lb 3.3 oz)   05/12/23 1143 (!) 170 kg (375 lb)   05/06/23 " 0258 (!) 170 kg (375 lb 8 oz)   04/19/23 0909 (!) 163 kg (359 lb)   04/03/23 1906 (!) 168 kg (370 lb)   03/27/23 0938 (!) 170 kg (373 lb 10.9 oz)   03/17/23 1153 (!) 168 kg (370 lb)   01/27/23 1501 (!) 168 kg (370 lb)   12/22/22 1501 (!) 171 kg (376 lb)   11/08/22 1035 (!) 161 kg (355 lb)   10/01/22 1141 (!) 164 kg (360 lb 10.8 oz)   05/18/22 1311 (!) 155 kg (341 lb)   03/24/22 1432 (!) 155 kg (341 lb)   03/02/22 1412 (!) 155 kg (341 lb)   01/12/22 1317 (!) 155 kg (341 lb)   12/30/21 1431 (!) 155 kg (341 lb)   12/01/21 1144 (!) 155 kg (341 lb 11.4 oz)   12/01/21 0843 (!) 157 kg (346 lb)   11/22/21 0839 (!) 157 kg (346 lb)   11/15/21 1148 (!) 157 kg (346 lb 12.5 oz)   11/09/21 1139 (!) 157 kg (345 lb 7.4 oz)   11/05/21 1130 (!) 159 kg (351 lb 3.1 oz)   11/02/21 1121 (!) 161 kg (356 lb)   10/27/21 1048 (!) 161 kg (356 lb)   10/21/21 1418 (!) 162 kg (356 lb 14.8 oz)   10/20/21 1120 (!) 160 kg (353 lb)   10/12/21 1240 (!) 160 kg (353 lb 13.4 oz)   10/06/21 1035 (!) 161 kg (354 lb)   09/29/21 1339 (!) 166 kg (365 lb 15.4 oz)   09/29/21 0857 (!) 166 kg (365 lb)   09/23/21 1333 (!) 166 kg (365 lb 1.3 oz)            Wt Change Observation Weight stability with fluctuations     Estimated/Assessed Needs       Energy Requirements 25 kcal/kg adj BW   EST Needs (kcal/day) 2595 kcal       Protein Requirements 1.0-1.2 g/kg adj BW   EST Daily Needs (g/day) 104-125 g       Fluid Requirements 25 ml/kg IBW    Estimated Needs (mL/day) 2055 ml     Labs/Medications         Pertinent Labs Reviewed.   Results from last 7 days   Lab Units 05/18/23  0441 05/17/23  0455 05/16/23  0820 05/14/23  0534 05/13/23  0451 05/12/23  1447   SODIUM mmol/L 132* 134* 134*   < > 129* 130*   POTASSIUM mmol/L 4.7 4.9 4.8   < > 3.9 4.2   CHLORIDE mmol/L 97* 100 101   < > 98 98   CO2 mmol/L 27.0 28.2 28.4   < > 21.0* 19.9*   BUN mg/dL 11 12 12   < > 17 15   CREATININE mg/dL 0.59* 0.58* 0.57*   < > 0.72* 0.76   CALCIUM mg/dL 8.1* 8.5* 8.4*   < > 7.6* 8.2*    BILIRUBIN mg/dL  --   --   --   --  1.2 1.5*   ALK PHOS U/L  --   --   --   --  160* 162*   ALT (SGPT) U/L  --   --   --   --  18 23   AST (SGOT) U/L  --   --   --   --  22 31   GLUCOSE mg/dL 298* 284* 235*   < > 203* 214*    < > = values in this interval not displayed.     Results from last 7 days   Lab Units 05/18/23  0441 05/16/23  0820 05/15/23  0448 05/14/23  0534 05/13/23  0451   MAGNESIUM mg/dL  --   --  1.9  --  1.7   PHOSPHORUS mg/dL 3.5   < > 2.3*   < >  --    HEMOGLOBIN g/dL  --   --  10.4*   < > 10.4*   HEMATOCRIT %  --   --  32.0*   < > 33.0*    < > = values in this interval not displayed.     COVID19   Date Value Ref Range Status   05/12/2023 Not Detected Not Detected - Ref. Range Final     Lab Results   Component Value Date    HGBA1C 6.60 (H) 04/19/2023         Pertinent Medications Reviewed.     Current Nutrition Orders & Evaluation of Intake       Oral Nutrition     Current PO Diet Diet: Cardiac Diets, Diabetic Diets, Fluid Restriction (240 mL/tray) Diets; Healthy Heart (2-3 Na+); Consistent Carbohydrate; 1500 mL/day; Texture: Regular Texture (IDDSI 7); Fluid Consistency: Thin (IDDSI 0)   Supplement No active supplement orders       Malnutrition Severity Assessment                Nutrition Diagnosis         Nutrition Dx Problem 1 No nutrition diagnosis at this time.     Nutrition Intervention         No intervention indicated.      Medical Nutrition Therapy/Nutrition Education          Learner     Readiness N/A  N/A     Method     Response N/A  N/A     Monitor/Evaluation        Monitor Per protocol.     Nutrition Discharge Plan         No nutrition needs identified at this time.     Electronically signed by:  Brigitte Mcclellan RD  05/19/23 09:33 EDT

## 2023-05-19 NOTE — THERAPY TREATMENT NOTE
Acute Care - Physical Therapy Progress Note  [facility]     Patient Name: Jose Shaikh  : 1960  MRN: [id number]  Today's Date: 2023      Visit Dx:     ICD-10-CM ICD-9-CM   1. Acute febrile illness  R50.9 780.60   2. Sepsis without acute organ dysfunction, due to unspecified organism  A41.9 038.9     995.91   3. Pneumonia of right upper lobe due to infectious organism  J18.9 486   4. Uncontrolled type 2 diabetes mellitus with hyperglycemia  E11.65 250.02   5. Atrial fibrillation with rapid ventricular response  I48.91 427.31   6. Acute osteomyelitis of right foot  M86.171 730.07   7. Decreased activities of daily living (ADL)  Z78.9 V49.89   8. Difficulty in walking  R26.2 719.7     Patient Active Problem List   Diagnosis   • Diabetic ulcer of left heel associated with type 2 DM   • Acute osteomyelitis of left calcaneus    • Diabetic ulcer of left heel associated with type 2 DM   • Diabetic ulcer of right midfoot associated with type 2 DM   • Paroxysmal atrial fibrillation   • Essential hypertension   • Hyperlipidemia LDL goal <100   • Cellulitis and abscess of foot   • High alkaline phosphatase level   • Osteomyelitis   • Onychomycosis   • Onychocryptosis   • Foot pain, bilateral   • Osteomyelitis of foot, right, acute   • Cellulitis of right foot   • Type 2 diabetes mellitus, with long-term current use of insulin   • Class 3 severe obesity due to excess calories with serious comorbidity and body mass index (BMI) of 45.0 to 49.9 in adult   • Anxiety disorder, unspecified   • Claustrophobia   • Dependence on wheelchair   • Depression, unspecified   • Long term (current) use of anticoagulants   • Long term (current) use of oral hypoglycemic drugs   • Wound of foot   • Non-prs chronic ulcer oth prt r foot limited to brkdwn skin   • Orthostatic hypotension   • Other chronic osteomyelitis, right ankle and foot   • Personal history of nicotine dependence   • Thrombocytopenia, unspecified   • Unspecified open  wound, right foot, initial encounter   • Diabetic foot infection   • Subacute osteomyelitis of right foot   • Right foot pain   • Sepsis     Past Medical History:   Diagnosis Date   • Absence of toe of right foot    • Acute osteomyelitis of left calcaneus  8/18/2021   • Anxiety and depression    • Arthritis    • Claustrophobia    • Corns and callus    • Diabetic ulcer of left heel associated with type 2 DM 8/18/2021   • Diabetic ulcer of left heel associated with type 2 DM 7/6/2021   • Diabetic ulcer of right midfoot associated with type 2 DM 8/18/2021   • Difficulty walking    • Essential hypertension 8/31/2021   • Hammertoe    • Hyperlipidemia LDL goal <100 8/31/2021   • Ingrown toenail    • Obesity    • Paroxysmal atrial fibrillation 8/31/2021   • Polyneuropathy    • Pressure ulcer, stage 1    • Tinea unguium    • Type 2 diabetes mellitus with polyneuropathy      Past Surgical History:   Procedure Laterality Date   • CYST REMOVAL      center of back; benign   • INCISION AND DRAINAGE ABSCESS      back   • INCISION AND DRAINAGE LEG Right 12/10/2021    Procedure: INCISION AND DRAINAGE LOWER EXTREMITY;  Surgeon: Ash Leyva DPM;  Location: [facility];  Service: Podiatry;  Laterality: Right;   • OTHER SURGICAL HISTORY      Surgical clips left foot   • TOE SURGERY Right     Removal of 5th toe   • TRANS METATARSAL AMPUTATION Right 12/2/2021    Procedure: AMPUTATION TRANS METATARSAL;  Surgeon: Ash Leyva DPM;  Location: [facility];  Service: Podiatry;  Laterality: Right;   • WRIST SURGERY Left     repair of injury     PT Assessment (last 12 hours)     PT Evaluation and Treatment     Row Name 05/19/23 1200          Physical Therapy Time and Intention    Subjective Information complains of;pain  -CS     Document Type therapy note (daily note)  -CS     Mode of Treatment individual therapy;physical therapy  -CS     Patient Effort good  -CS     Symptoms Noted During/After Treatment increased  pain  -     Row Name 05/19/23 1200          Pain    Pretreatment Pain Rating 3/10  -CS     Posttreatment Pain Rating 6/10  -     Pain Location - Side/Orientation Left  -CS     Pain Location - hip  -CS     Pre/Posttreatment Pain Comment Pt. reports getting cramps in his L hip  -     Row Name 05/19/23 1200          Hip (Therapeutic Exercise)    Hip (Therapeutic Exercise) isometric exercises  -     Hip AAROM (Therapeutic Exercise) bilateral;supine;10 repetitions;2 sets  hip abd/add, hip IR/ER, heel slides  -     Hip Isometrics (Therapeutic Exercise) bilateral;gluteal sets;supine;10 repetitions;3 second hold;2 sets  -     Row Name 05/19/23 1200          Knee (Therapeutic Exercise)    Knee (Therapeutic Exercise) isometric exercises;AAROM (active assistive range of motion)  -     Knee AAROM (Therapeutic Exercise) bilateral;supine;10 repetitions;2 sets  SAQ's, SLR's  -     Knee Isometrics (Therapeutic Exercise) bilateral;quad sets;supine;10 repetitions;3 second hold;2 sets  -     Row Name 05/19/23 1200          Ankle (Therapeutic Exercise)    Ankle AROM (Therapeutic Exercise) bilateral;dorsiflexion;plantarflexion;supine;20 repititions  -     Row Name             Wound 12/02/21 Right anterior foot Incision    Wound - Properties Group Placement Date: 12/02/21  -ES Side: Right  -ES Orientation: anterior  -ES Location: foot  -ES Primary Wound Type: Incision  -ES    Retired Wound - Properties Group Placement Date: 12/02/21  -ES Side: Right  -ES Orientation: anterior  -ES Location: foot  -ES Primary Wound Type: Incision  -ES    Retired Wound - Properties Group Date first assessed: 12/02/21  -ES Side: Right  -ES Location: foot  -ES Primary Wound Type: Incision  -ES    Row Name             Wound 06/22/21 1133 Left lateral heel    Wound - Properties Group Placement Date: 06/22/21  -FABIOLA Placement Time: 1133  -FABIOLA Present on Hospital Admission: Y  -FABIOLA Side: Left  -FABIOLA Orientation: lateral  -FABIOLA Location: heel  -FABIOLA     Retired Wound - Properties Group Placement Date: 06/22/21  -FABIOLA Placement Time: 1133  -FABIOLA Present on Hospital Admission: Y  -FABIOLA Side: Left  -FABIOLA Orientation: lateral  -FABIOLA Location: heel  -FABIOLA    Retired Wound - Properties Group Date first assessed: 06/22/21  -FABIOLA Time first assessed: 1133  -FABIOLA Present on Hospital Admission: Y  -FABIOLA Side: Left  -FABIOLA Location: heel  -FABIOLA    Row Name 05/19/23 1200          Positioning and Restraints    Pre-Treatment Position in bed  -CS     Post Treatment Position bed  -CS     In Bed supine;call light within reach;encouraged to call for assist  -CS     Row Name 05/19/23 1200          Progress Summary (PT)    Progress Toward Functional Goals (PT) progress toward functional goals is good  -CS           User Key  (r) = Recorded By, (t) = Taken By, (c) = Cosigned By    Initials Name Provider Type    Marlen Vaughn, RN Registered Nurse    Ronald Huerta PTA Physical Therapist Assistant    Katlyn Garcia RN Registered Nurse                  PT Recommendation and Plan     Progress Summary (PT)  Progress Toward Functional Goals (PT): progress toward functional goals is good   Outcome Measures     Row Name 05/19/23 1200 05/18/23 1400 05/16/23 1400       How much help from another person do you currently need...    Turning from your back to your side while in flat bed without using bedrails? 3  -CS 3  -DP 3  -MOE (r) JF (t) MOE (c)    Moving from lying on back to sitting on the side of a flat bed without bedrails? 2  -CS 2  -DP 2  -MOE (r) JF (t) MOE (c)    Moving to and from a bed to a chair (including a wheelchair)? 2  -CS 2  -DP 2  -MOE (r) JF (t) MOE (c)    Standing up from a chair using your arms (e.g., wheelchair, bedside chair)? 2  -CS 2  -DP 1  -MOE (r) JF (t) MOE (c)    Climbing 3-5 steps with a railing? 1  -CS 1  -DP 1  -MOE (r) JF (t) MOE (c)    To walk in hospital room? 1  -CS 1  -DP 1  -MOE (r) JF (t) MOE (c)    AM-PAC 6 Clicks Score (PT) 11  -CS 11  -DP 10  -MOE (r) JF (t)       Functional  Assessment    Outcome Measure Options AM-PAC 6 Clicks Basic Mobility (PT)  -CS AM-PAC 6 Clicks Basic Mobility (PT)  -DP AM-PAC 6 Clicks Basic Mobility (PT)  -MOE (r) AMARI (t) MOE (c)          User Key  (r) = Recorded By, (t) = Taken By, (c) = Cosigned By    Initials Name Provider Type    DP Deedee Landers, PT Physical Therapist    MOEMerlin Muñiz, PT Physical Therapist    CS Ronald Fabian PTA Physical Therapist Assistant    Corey Swanson, PT Student PT Student                 Time Calculation:    PT Charges     Row Name 05/19/23 1255             Time Calculation    Start Time 1127  -CS      PT Received On 05/19/23  -CS         Timed Charges    67599 - PT Therapeutic Exercise Minutes 24  -CS         Total Minutes    Timed Charges Total Minutes 24  -CS       Total Minutes 24  -CS            User Key  (r) = Recorded By, (t) = Taken By, (c) = Cosigned By    Initials Name Provider Type    CS Ronald Fabian PTA Physical Therapist Assistant              Therapy Charges for Today     Code Description Service Date Service Provider Modifiers Qty    [id number] HC PT THER PROC EA 15 MIN 5/19/2023 Ronald Fabian PTA GP 2          PT G-Codes  Outcome Measure Options: AM-PAC 6 Clicks Basic Mobility (PT)  AM-PAC 6 Clicks Score (PT): 11  AM-PAC 6 Clicks Score (OT): 14    Ronald Fabian PTA  5/19/2023

## 2023-05-19 NOTE — PLAN OF CARE
Problem: Fall Injury Risk  Goal: Absence of Fall and Fall-Related Injury  Outcome: Ongoing, Progressing  Intervention: Identify and Manage Contributors  Recent Flowsheet Documentation  Taken 5/18/2023 2151 by Tiff Logan RN  Medication Review/Management: medications reviewed  Self-Care Promotion: independence encouraged  Intervention: Promote Injury-Free Environment  Recent Flowsheet Documentation  Taken 5/18/2023 2151 by Tiff Logan, RN  Safety Promotion/Fall Prevention: safety round/check completed   Goal Outcome Evaluation:                      Pt a&o x4. Given pain medications per MAR several times. Pt refused wound care to his RLE last night but allowed wound care to be done to LLE.

## 2023-05-19 NOTE — SIGNIFICANT NOTE
Wound Eval / Progress Noted    KEO Dominguez     Patient Name: Jose Shaikh  : 1960  MRN: [id number]  Today's Date: 2023                 Admit Date: 2023    Visit Dx:    ICD-10-CM ICD-9-CM   1. Acute febrile illness  R50.9 780.60   2. Sepsis without acute organ dysfunction, due to unspecified organism  A41.9 038.9     995.91   3. Pneumonia of right upper lobe due to infectious organism  J18.9 486   4. Uncontrolled type 2 diabetes mellitus with hyperglycemia  E11.65 250.02   5. Atrial fibrillation with rapid ventricular response  I48.91 427.31   6. Acute osteomyelitis of right foot  M86.171 730.07   7. Decreased activities of daily living (ADL)  Z78.9 V49.89   8. Difficulty in walking  R26.2 719.7       Patient Active Problem List   Diagnosis   • Diabetic ulcer of left heel associated with type 2 DM   • Acute osteomyelitis of left calcaneus    • Diabetic ulcer of left heel associated with type 2 DM   • Diabetic ulcer of right midfoot associated with type 2 DM   • Paroxysmal atrial fibrillation   • Essential hypertension   • Hyperlipidemia LDL goal <100   • Cellulitis and abscess of foot   • High alkaline phosphatase level   • Osteomyelitis   • Onychomycosis   • Onychocryptosis   • Foot pain, bilateral   • Osteomyelitis of foot, right, acute   • Cellulitis of right foot   • Type 2 diabetes mellitus, with long-term current use of insulin   • Class 3 severe obesity due to excess calories with serious comorbidity and body mass index (BMI) of 45.0 to 49.9 in adult   • Anxiety disorder, unspecified   • Claustrophobia   • Dependence on wheelchair   • Depression, unspecified   • Long term (current) use of anticoagulants   • Long term (current) use of oral hypoglycemic drugs   • Wound of foot   • Non-prs chronic ulcer oth prt r foot limited to brkdwn skin   • Orthostatic hypotension   • Other chronic osteomyelitis, right ankle and foot   • Personal history of nicotine dependence   • Thrombocytopenia,  unspecified   • Unspecified open wound, right foot, initial encounter   • Diabetic foot infection   • Subacute osteomyelitis of right foot   • Right foot pain   • Sepsis        Past Medical History:   Diagnosis Date   • Absence of toe of right foot    • Acute osteomyelitis of left calcaneus  8/18/2021   • Anxiety and depression    • Arthritis    • Claustrophobia    • Corns and callus    • Diabetic ulcer of left heel associated with type 2 DM 8/18/2021   • Diabetic ulcer of left heel associated with type 2 DM 7/6/2021   • Diabetic ulcer of right midfoot associated with type 2 DM 8/18/2021   • Difficulty walking    • Essential hypertension 8/31/2021   • Hammertoe    • Hyperlipidemia LDL goal <100 8/31/2021   • Ingrown toenail    • Obesity    • Paroxysmal atrial fibrillation 8/31/2021   • Polyneuropathy    • Pressure ulcer, stage 1    • Tinea unguium    • Type 2 diabetes mellitus with polyneuropathy         Past Surgical History:   Procedure Laterality Date   • CYST REMOVAL      center of back; benign   • INCISION AND DRAINAGE ABSCESS      back   • INCISION AND DRAINAGE LEG Right 12/10/2021    Procedure: INCISION AND DRAINAGE LOWER EXTREMITY;  Surgeon: Ash Leyva DPM;  Location: [facility];  Service: Podiatry;  Laterality: Right;   • OTHER SURGICAL HISTORY      Surgical clips left foot   • TOE SURGERY Right     Removal of 5th toe   • TRANS METATARSAL AMPUTATION Right 12/2/2021    Procedure: AMPUTATION TRANS METATARSAL;  Surgeon: Ash Leyva DPM;  Location: [facility] OR;  Service: Podiatry;  Laterality: Right;   • WRIST SURGERY Left     repair of injury         Physical Assessment:  Wound 06/22/21 1133 Left lateral heel (Active)   Wound Image   05/19/23 1105   Dressing Appearance intact;dry 05/19/23 1105   Closure None 05/19/23 1105   Base red;moist;pink;bleeding 05/19/23 1105   Red (%), Wound Tissue Color 100 05/19/23 1105   Periwound dry;intact 05/19/23 1105   Periwound Temperature warm  05/19/23 1105   Periwound Skin Turgor firm 05/19/23 1105   Edges callused;open 05/19/23 1105   Drainage Characteristics/Odor sanguineous;serosanguineous;bleeding controlled 05/19/23 1105   Drainage Amount small 05/19/23 1105   Care, Wound cleansed with;irrigated with;sterile normal saline 05/19/23 1105   Dressing Care dressing applied;antimicrobial agent applied;dressing moistened;gauze, [id number];silicone;border dressing 05/19/23 1105   Periwound Care absorptive dressing applied 05/19/23 1105       Wound 12/02/21 Right anterior foot Incision (Active)   Dressing Appearance intact 05/19/23 1105   Closure None 05/19/23 1105   Base bleeding;red;moist 05/19/23 1105   Red (%), Wound Tissue Color 100 05/19/23 1105   Periwound redness;pink;moist 05/19/23 1105   Periwound Temperature warm 05/19/23 1105   Periwound Skin Turgor firm 05/19/23 1105   Edges callused;open 05/19/23 1105   Drainage Characteristics/Odor sanguineous;serosanguineous 05/19/23 1105   Drainage Amount small 05/19/23 1105   Care, Wound cleansed with;irrigated with;sterile normal saline 05/19/23 1105   Dressing Care dressing applied;antimicrobial agent applied;dressing moistened;gauze, [id number];gauze, dry;tubular wrap;elastic bandage 05/19/23 1105   Periwound Care absorptive dressing applied 05/19/23 1105       Wound 05/19/23 1317 gluteal Blisters (Active)   Wound Image   05/19/23 1105   Dressing Appearance open to air 05/19/23 1105   Closure None 05/19/23 1105   Base pink;dry 05/19/23 1105   Periwound intact;dry;blistered 05/19/23 1105   Periwound Temperature warm 05/19/23 1105   Periwound Skin Turgor soft 05/19/23 1105   Edges rolled/closed 05/19/23 1105   Drainage Amount none 05/19/23 1105   Care, Wound cleansed with;sterile normal saline 05/19/23 1105   Dressing Care open to air 05/19/23 1105   Periwound Care barrier ointment applied 05/19/23 1105        Wound Check / Follow-up:  Patient seen today for a wound check and dressing change. Patient  with two small serous filled blisters to the right gluteal aspect. No drainage currently. Recommending to cover with non-adherent petroleum gauze and change daily. Otherwise buttocks is intact and blanches.  Wound bases to the left heel and right anterior foot with beefy red granular tissue present. Periwound is yellow and thickened with callused tissue present. Cleansed with NS. Patient continues to respond appropriately to betadine, would recommend to continue current treatment at this time.    Impression: chronic non-healing wounds to bilateral feet, blistering    Short term goals: regain skin integrity, pressure reduction, skin protection, BID dressing changes    Karon Bolton RN    5/19/2023    13:18 EDT

## 2023-05-20 LAB
GLUCOSE BLDC GLUCOMTR-MCNC: 202 MG/DL (ref 70–99)
GLUCOSE BLDC GLUCOMTR-MCNC: 253 MG/DL (ref 70–99)
GLUCOSE BLDC GLUCOMTR-MCNC: 272 MG/DL (ref 70–99)
GLUCOSE BLDC GLUCOMTR-MCNC: 292 MG/DL (ref 70–99)

## 2023-05-20 PROCEDURE — 25010000002 MORPHINE PER 10 MG: Performed by: INTERNAL MEDICINE

## 2023-05-20 PROCEDURE — 63710000001 INSULIN LISPRO (HUMAN) PER 5 UNITS

## 2023-05-20 PROCEDURE — 82948 REAGENT STRIP/BLOOD GLUCOSE: CPT

## 2023-05-20 PROCEDURE — 63710000001 INSULIN DETEMIR PER 5 UNITS: Performed by: INTERNAL MEDICINE

## 2023-05-20 PROCEDURE — 99231 SBSQ HOSP IP/OBS SF/LOW 25: CPT | Performed by: INTERNAL MEDICINE

## 2023-05-20 PROCEDURE — 94799 UNLISTED PULMONARY SVC/PX: CPT

## 2023-05-20 RX ADMIN — ATORVASTATIN CALCIUM 10 MG: 10 TABLET, FILM COATED ORAL at 21:21

## 2023-05-20 RX ADMIN — ACETAMINOPHEN 1000 MG: 325 TABLET ORAL at 06:31

## 2023-05-20 RX ADMIN — INSULIN LISPRO 4 UNITS: 100 INJECTION, SOLUTION INTRAVENOUS; SUBCUTANEOUS at 21:20

## 2023-05-20 RX ADMIN — INSULIN DETEMIR 20 UNITS: 100 INJECTION, SOLUTION SUBCUTANEOUS at 08:42

## 2023-05-20 RX ADMIN — INSULIN LISPRO 4 UNITS: 100 INJECTION, SOLUTION INTRAVENOUS; SUBCUTANEOUS at 17:13

## 2023-05-20 RX ADMIN — DIGOXIN 125 MCG: 125 TABLET ORAL at 12:44

## 2023-05-20 RX ADMIN — ACETAMINOPHEN 1000 MG: 325 TABLET ORAL at 13:52

## 2023-05-20 RX ADMIN — Medication 10 ML: at 08:45

## 2023-05-20 RX ADMIN — AMOXICILLIN 1000 MG: 500 CAPSULE ORAL at 13:52

## 2023-05-20 RX ADMIN — OXYCODONE HYDROCHLORIDE 10 MG: 5 TABLET ORAL at 17:14

## 2023-05-20 RX ADMIN — DILTIAZEM HYDROCHLORIDE 120 MG: 120 CAPSULE, COATED, EXTENDED RELEASE ORAL at 08:44

## 2023-05-20 RX ADMIN — MORPHINE SULFATE 2 MG: 2 INJECTION, SOLUTION INTRAMUSCULAR; INTRAVENOUS at 00:31

## 2023-05-20 RX ADMIN — PREGABALIN 25 MG: 25 CAPSULE ORAL at 13:52

## 2023-05-20 RX ADMIN — MIDODRINE HYDROCHLORIDE 5 MG: 5 TABLET ORAL at 08:10

## 2023-05-20 RX ADMIN — APIXABAN 5 MG: 5 TABLET, FILM COATED ORAL at 21:21

## 2023-05-20 RX ADMIN — APIXABAN 5 MG: 5 TABLET, FILM COATED ORAL at 08:42

## 2023-05-20 RX ADMIN — PREGABALIN 25 MG: 25 CAPSULE ORAL at 06:31

## 2023-05-20 RX ADMIN — OXYCODONE HYDROCHLORIDE 10 MG: 5 TABLET ORAL at 08:10

## 2023-05-20 RX ADMIN — METOPROLOL SUCCINATE 50 MG: 50 TABLET, EXTENDED RELEASE ORAL at 08:42

## 2023-05-20 RX ADMIN — MIDODRINE HYDROCHLORIDE 5 MG: 5 TABLET ORAL at 12:44

## 2023-05-20 RX ADMIN — AMOXICILLIN 1000 MG: 500 CAPSULE ORAL at 06:30

## 2023-05-20 RX ADMIN — MIDODRINE HYDROCHLORIDE 5 MG: 5 TABLET ORAL at 17:14

## 2023-05-20 RX ADMIN — ACETAMINOPHEN 1000 MG: 325 TABLET ORAL at 21:20

## 2023-05-20 RX ADMIN — PREGABALIN 25 MG: 25 CAPSULE ORAL at 21:20

## 2023-05-20 RX ADMIN — INSULIN DETEMIR 25 UNITS: 100 INJECTION, SOLUTION SUBCUTANEOUS at 21:20

## 2023-05-20 RX ADMIN — FAMOTIDINE 40 MG: 20 TABLET ORAL at 08:42

## 2023-05-20 RX ADMIN — INSULIN LISPRO 3 UNITS: 100 INJECTION, SOLUTION INTRAVENOUS; SUBCUTANEOUS at 08:42

## 2023-05-20 RX ADMIN — METOPROLOL SUCCINATE 50 MG: 50 TABLET, EXTENDED RELEASE ORAL at 21:20

## 2023-05-20 RX ADMIN — DOCUSATE SODIUM 50MG AND SENNOSIDES 8.6MG 2 TABLET: 8.6; 5 TABLET, FILM COATED ORAL at 21:21

## 2023-05-20 RX ADMIN — AMOXICILLIN 1000 MG: 500 CAPSULE ORAL at 00:25

## 2023-05-20 RX ADMIN — INSULIN LISPRO 4 UNITS: 100 INJECTION, SOLUTION INTRAVENOUS; SUBCUTANEOUS at 12:44

## 2023-05-20 RX ADMIN — AMOXICILLIN 1000 MG: 500 CAPSULE ORAL at 21:20

## 2023-05-20 RX ADMIN — Medication 10 ML: at 21:21

## 2023-05-20 NOTE — PLAN OF CARE
Goal Outcome Evaluation:  Plan of Care Reviewed With: patient        Progress: no change  Outcome Evaluation: Pt c/o pain/discomfort this shift, administered prn and scheduled pain meds as ordered. Provided wound care as ordered. Pt refused turns majority of the night, educated importance of reposition/turns to prevent skin injuries and promote comfort, pt still refused intervention. Pt on fluid restrictions as ordered. No new issues or needs noted at this time.

## 2023-05-20 NOTE — PROGRESS NOTES
[facility]   Hospitalist Progress Note  Date: 2023  Patient Name: Jose Shaikh  : 1960  MRN: [id number]  Date of admission: 2023      Subjective   Subjective     Chief Complaint: Left hip pain    Summary:   Jose Shaikh is a 62 y.o. male past medical history of osteomyelitis, type 2 diabetes, hypertension, A-fib, dyslipidemia, obesity with BMI of 48, who was recently hospital for osteomyelitis and he returns due to hip pain.        Patient was recently admitted to the hospital in early April for foot infection.  There was recommendation for amputation due to osteomyelitis in the foot and the patient refused.  He was unable to be placed at that time.  He is a sex offender making it very difficult for placement although [facility] was an option.  As result, he opted to go home on oral antibiotics to treat Alcaligenes facialis and Morganella morganii with doxycycline and ciprofloxacin.  The patient was seen in wound care in middle of April and at that time Dr. Daniels attempted to switch antibiotics to Levaquin per pharmacy recommendations based off culture data but was unable to get hold the patient so he was never switched.  The patient states the only reason he came to the emergency department was because his left hip was hurting.  Unaware of fevers.  Chills.  Cough.  Shortness of breath.  The patient came to the ER for hip pain.        In the emergency department the patient's vital signs are as follows temperature is 103.4, pulse is 139, blood pressure is 92/65, 95% on room air.  CBC shows a white blood cell count of 20.55 with neutrophils of 88.2.  CRP is 20.53, which is up from 7.4 in April and lactate is 2.6.  Sed rate was 42 in April and is currently 75.  Bicarb is 19.9 and sodium is 130.  Urinalysis shows positive nitrite and small leuk esterase but no bacteria.  Patient also seems to have a pneumonia in his right upper lobe.  X-ray of the right foot shows evidence of  osteomyelitis involving the distal aspect of the cuboid and gas in the soft tissue along the plantar aspect of the foot just proximal amputation site and periostitis involving the cuneiform.  Patient got 1 L of normal saline.  Patient received Zosyn and vancomycin for antibiotics.  Podiatry was consulted and did not feel like the foot was the source.  Patient will be admitted to the hospital for severe sepsis with unclear source at this time.  The source could be pneumonia, urinary tract infection or most likely the foot.  Patient will also be managed with A-fib with RVR.  Patient blood culture grew Streptococcus agalactiae.  Zyvox DC'd.  Patient does not want any amputation..  Patient wound culture from right foot also grew Streptococcus.  X-ray of pelvis and hip shows DJD of bilateral hips left worse than right.  Patient refused MRI of the left hip and order was canceled as patient is very claustrophobic.  Per ID recommendation needs 6 weeks of oral amoxicillin for osteomyelitis of right foot.    Interval Followup: Patient is without any complaints at the time of my evaluation.  Staff is at bedside assisting patient with bathing and dressing.    Review of Systems   All systems were reviewed and negative except for: Summary and interval follow-up    Objective   Objective     Vitals:   Temp:  [97.6 °F (36.4 °C)-98.3 °F (36.8 °C)] 97.7 °F (36.5 °C)  Heart Rate:  [68-73] 70  Resp:  [18-20] 20  BP: (119-134)/(51-64) 119/57  Physical Exam      Constitutional: Awake, alert, lying in the bed in no acute distress              Eyes: Pupils equal, sclerae anicteric, no conjunctival injection              HENT: NCAT, mucous membranes moist              Neck: Supple, no thyromegaly, no lymphadenopathy, trachea midline              Respiratory: coarse breath sounds.                Cardiovascular: Irregularly irregular, no murmurs, rubs, or gallops, palpable pedal pulses bilaterally              Gastrointestinal: Positive bowel  sounds, soft, nontender, nondistended              Musculoskeletal: +1 bilateral ankle edema, no clubbing or cyanosis to extremities.               Psychiatric: Appropriate affect, cooperative              Neurologic: Oriented x 3, strength symmetric in all extremities, Cranial Nerves grossly intact to confrontation, speech clear              Skin: No rashes.  Right foot is  dressed.       Result Review    Result Review:  I have personally reviewed the results for the past 24 hours and agree with these findings:  [x]  Laboratory  [x]  Microbiology  [x]  Radiology  [x]  EKG/Telemetry   []  Cardiology/Vascular   []  Pathology  [x]  Old records  [x]  Other: Medications    Assessment & Plan   Assessment / Plan     Assessment:  Sepsis present on admission.  Patient presented with fever, tachycardia, lactic acidosis, elevated procalcitonin and leukocytosis.  Resolved  Right upper lobe healthcare associated pneumonia .  Streptococcus agalactiae bacteremia.  Subsequent cultures negative  Chronic osteomyelitis of right foot involving cuboid and may be cuneiform.  On p.o. amoxicillin for 6 weeks  Cellulitis of right foot due to Streptococcus agalactiae.  Paroxysmal A-fib with intermittent RVR on Eliquis.  Bilateral diabetic foot ulcers.  NIDDM.  Most recent A1c 6.6 in April 2023  Morbid obesity BMI of 48.1.  S/p TMA of right foot.  Hyponatremia.  likely from hyperglycemia/volume overload.  Clinically significant.  Improved  Hypophosphatemia.  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    Plan:  Patient refused MRI of left hip.  X-ray showed arthritis  Continue to fluid restriction for hyponatremia.  Overall improved  Finished 6 days of Rocephin.  Antibiotics changed to p.o. amoxicillin for 6 weeks for osteomyelitis as per ID input.  Right foot is likely source of bacteremia as per ID.  Wound culture and blood culture noted.  Repeat blood cultures final results with no growth.  Appreciate podiatry input.  Per  podiatry right foot wound not source of infection.  Patient does not want below-knee amputation.  Resumed midodrine  Continue p.o. digoxin  Continue long-acting p.o. Cardizem.    Resumed home metoprolol at increased dose   IV and oral narcotics for pain control.  Finished as needed Toradol.  Started on Lyrica.  Visine eyedrops   MRSA PCR.,  Strep pneumonia and Legionella antigen negative  Continue basal bolus insulin therapy.  Although will further adjust patient's Levemir.  We will increase to 25 units twice daily.  Previous home dose was 60 units of Levemir daily.  Continue wound care recommendations.    Continue PT/OT  Telemetry    Unfortunately [facility] is unable to accept the patient for rehab purposes.  Case management is still working to find a facility which can accept the patient for rehab.  Patient is otherwise ready for discharge    DVT prophylaxis:  Medical DVT prophylaxis orders are present.  Home Eliquis    CODE STATUS:   Level Of Support Discussed With: Patient  Code Status (Patient has no pulse and is not breathing): CPR (Attempt to Resuscitate)  Medical Interventions (Patient has pulse or is breathing): Full Support      Part of this note may be an electronic transcription/translation of spoken language to printed text using the Dragon Dictation System.       Electronically signed by Cayla Mcdonald MD, 05/20/23, 9:26 AM EDT.

## 2023-05-21 LAB
GLUCOSE BLDC GLUCOMTR-MCNC: 173 MG/DL (ref 70–99)
GLUCOSE BLDC GLUCOMTR-MCNC: 229 MG/DL (ref 70–99)
GLUCOSE BLDC GLUCOMTR-MCNC: 282 MG/DL (ref 70–99)
GLUCOSE BLDC GLUCOMTR-MCNC: 307 MG/DL (ref 70–99)

## 2023-05-21 PROCEDURE — 82948 REAGENT STRIP/BLOOD GLUCOSE: CPT

## 2023-05-21 PROCEDURE — 99232 SBSQ HOSP IP/OBS MODERATE 35: CPT | Performed by: INTERNAL MEDICINE

## 2023-05-21 PROCEDURE — 94799 UNLISTED PULMONARY SVC/PX: CPT

## 2023-05-21 PROCEDURE — 63710000001 INSULIN DETEMIR PER 5 UNITS: Performed by: INTERNAL MEDICINE

## 2023-05-21 PROCEDURE — 63710000001 INSULIN LISPRO (HUMAN) PER 5 UNITS

## 2023-05-21 RX ADMIN — ACETAMINOPHEN 1000 MG: 325 TABLET ORAL at 07:01

## 2023-05-21 RX ADMIN — INSULIN DETEMIR 25 UNITS: 100 INJECTION, SOLUTION SUBCUTANEOUS at 21:57

## 2023-05-21 RX ADMIN — PREGABALIN 25 MG: 25 CAPSULE ORAL at 07:00

## 2023-05-21 RX ADMIN — INSULIN LISPRO 5 UNITS: 100 INJECTION, SOLUTION INTRAVENOUS; SUBCUTANEOUS at 17:44

## 2023-05-21 RX ADMIN — MIDODRINE HYDROCHLORIDE 5 MG: 5 TABLET ORAL at 12:30

## 2023-05-21 RX ADMIN — METOPROLOL SUCCINATE 50 MG: 50 TABLET, EXTENDED RELEASE ORAL at 08:46

## 2023-05-21 RX ADMIN — DOCUSATE SODIUM 50MG AND SENNOSIDES 8.6MG 2 TABLET: 8.6; 5 TABLET, FILM COATED ORAL at 08:46

## 2023-05-21 RX ADMIN — OXYCODONE HYDROCHLORIDE 10 MG: 5 TABLET ORAL at 09:58

## 2023-05-21 RX ADMIN — APIXABAN 5 MG: 5 TABLET, FILM COATED ORAL at 08:46

## 2023-05-21 RX ADMIN — DILTIAZEM HYDROCHLORIDE 120 MG: 120 CAPSULE, COATED, EXTENDED RELEASE ORAL at 08:45

## 2023-05-21 RX ADMIN — MIDODRINE HYDROCHLORIDE 5 MG: 5 TABLET ORAL at 17:44

## 2023-05-21 RX ADMIN — ACETAMINOPHEN 1000 MG: 325 TABLET ORAL at 21:57

## 2023-05-21 RX ADMIN — FAMOTIDINE 40 MG: 20 TABLET ORAL at 08:45

## 2023-05-21 RX ADMIN — INSULIN LISPRO 2 UNITS: 100 INJECTION, SOLUTION INTRAVENOUS; SUBCUTANEOUS at 08:45

## 2023-05-21 RX ADMIN — INSULIN LISPRO 3 UNITS: 100 INJECTION, SOLUTION INTRAVENOUS; SUBCUTANEOUS at 12:33

## 2023-05-21 RX ADMIN — ACETAMINOPHEN 1000 MG: 325 TABLET ORAL at 15:11

## 2023-05-21 RX ADMIN — PREGABALIN 25 MG: 25 CAPSULE ORAL at 21:57

## 2023-05-21 RX ADMIN — Medication 10 ML: at 08:46

## 2023-05-21 RX ADMIN — METOPROLOL SUCCINATE 50 MG: 50 TABLET, EXTENDED RELEASE ORAL at 21:57

## 2023-05-21 RX ADMIN — MIDODRINE HYDROCHLORIDE 5 MG: 5 TABLET ORAL at 06:59

## 2023-05-21 RX ADMIN — DIGOXIN 125 MCG: 125 TABLET ORAL at 12:30

## 2023-05-21 RX ADMIN — INSULIN DETEMIR 25 UNITS: 100 INJECTION, SOLUTION SUBCUTANEOUS at 08:45

## 2023-05-21 RX ADMIN — OXYCODONE HYDROCHLORIDE 10 MG: 5 TABLET ORAL at 19:40

## 2023-05-21 RX ADMIN — AMOXICILLIN 1000 MG: 500 CAPSULE ORAL at 15:11

## 2023-05-21 RX ADMIN — AMOXICILLIN 1000 MG: 500 CAPSULE ORAL at 07:00

## 2023-05-21 RX ADMIN — APIXABAN 5 MG: 5 TABLET, FILM COATED ORAL at 21:57

## 2023-05-21 RX ADMIN — ATORVASTATIN CALCIUM 10 MG: 10 TABLET, FILM COATED ORAL at 21:57

## 2023-05-21 RX ADMIN — PREGABALIN 25 MG: 25 CAPSULE ORAL at 15:12

## 2023-05-21 RX ADMIN — AMOXICILLIN 1000 MG: 500 CAPSULE ORAL at 21:57

## 2023-05-21 RX ADMIN — OXYCODONE HYDROCHLORIDE 10 MG: 5 TABLET ORAL at 04:00

## 2023-05-21 RX ADMIN — INSULIN LISPRO 4 UNITS: 100 INJECTION, SOLUTION INTRAVENOUS; SUBCUTANEOUS at 22:09

## 2023-05-21 NOTE — PROGRESS NOTES
[facility]   Hospitalist Progress Note  Date: 2023  Patient Name: Jose Shaikh  : 1960  MRN: [id number]  Date of admission: 2023      Subjective   Subjective     Chief Complaint: Left hip pain    Summary:   Jose Shaikh is a 62 y.o. male past medical history of osteomyelitis, type 2 diabetes, hypertension, A-fib, dyslipidemia, obesity with BMI of 48, who was recently hospital for osteomyelitis and he returns due to hip pain.        Patient was recently admitted to the hospital in early April for foot infection.  There was recommendation for amputation due to osteomyelitis in the foot and the patient refused.  He was unable to be placed at that time.  He is a sex offender making it very difficult for placement although [facility] was an option.  As result, he opted to go home on oral antibiotics to treat Alcaligenes facialis and Morganella morganii with doxycycline and ciprofloxacin.  The patient was seen in wound care in middle of April and at that time Dr. Daniels attempted to switch antibiotics to Levaquin per pharmacy recommendations based off culture data but was unable to get hold the patient so he was never switched.  The patient states the only reason he came to the emergency department was because his left hip was hurting.  Unaware of fevers.  Chills.  Cough.  Shortness of breath.  The patient came to the ER for hip pain.        In the emergency department the patient's vital signs are as follows temperature is 103.4, pulse is 139, blood pressure is 92/65, 95% on room air.  CBC shows a white blood cell count of 20.55 with neutrophils of 88.2.  CRP is 20.53, which is up from 7.4 in April and lactate is 2.6.  Sed rate was 42 in April and is currently 75.  Bicarb is 19.9 and sodium is 130.  Urinalysis shows positive nitrite and small leuk esterase but no bacteria.  Patient also seems to have a pneumonia in his right upper lobe.  X-ray of the right foot shows evidence of  osteomyelitis involving the distal aspect of the cuboid and gas in the soft tissue along the plantar aspect of the foot just proximal amputation site and periostitis involving the cuneiform.  Patient got 1 L of normal saline.  Patient received Zosyn and vancomycin for antibiotics.  Podiatry was consulted and did not feel like the foot was the source.  Patient will be admitted to the hospital for severe sepsis with unclear source at this time.  The source could be pneumonia, urinary tract infection or most likely the foot.  Patient will also be managed with A-fib with RVR.  Patient blood culture grew Streptococcus agalactiae.  Zyvox DC'd.  Patient does not want any amputation..  Patient wound culture from right foot also grew Streptococcus.  X-ray of pelvis and hip shows DJD of bilateral hips left worse than right.  Patient refused MRI of the left hip and order was canceled as patient is very claustrophobic.  Per ID recommendation needs 6 weeks of oral amoxicillin for osteomyelitis of right foot.    Interval Followup: No acute events overnight, patient resting comfortably in bed    Objective   Objective     Vitals:   Temp:  [97.9 °F (36.6 °C)-98.8 °F (37.1 °C)] 98.1 °F (36.7 °C)  Heart Rate:  [65-66] 65  Resp:  [18-20] 18  BP: (108-127)/(46-60) 108/46  Physical Exam      GEN: No acute distress  HEENT: Moist mucous membranes  LUNGS: Equal chest rise bilaterally  CARDIAC: Regular rate and rhythm  NEURO: Moving all 4 extremities spontaneously     Result Review    Result Review:  I have personally reviewed the results for the past 24 hours and agree with these findings:  [x]  Laboratory  [x]  Microbiology  [x]  Radiology  [x]  EKG/Telemetry   []  Cardiology/Vascular   []  Pathology  [x]  Old records  [x]  Other: Medications    Assessment & Plan   Assessment / Plan     Assessment:  Sepsis present on admission.  Patient presented with fever, tachycardia, lactic acidosis, elevated procalcitonin and leukocytosis.   Resolved  Right upper lobe healthcare associated pneumonia .  Streptococcus agalactiae bacteremia.  Subsequent cultures negative  Chronic osteomyelitis of right foot involving cuboid and may be cuneiform.  On p.o. amoxicillin for 6 weeks  Cellulitis of right foot due to Streptococcus agalactiae.  Paroxysmal A-fib with intermittent RVR on Eliquis.  Bilateral diabetic foot ulcers.  NIDDM.  Most recent A1c 6.6 in April 2023  Morbid obesity BMI of 48.1.  S/p TMA of right foot.  Hyponatremia.  likely from hyperglycemia/volume overload.  Clinically significant.  Improved  Hypophosphatemia.  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    Plan:  Patient refused MRI of left hip.  X-ray showed arthritis  Continue to fluid restriction for hyponatremia.  Overall improved  Finished 6 days of Rocephin, continue 6 weeks of oral Amoxil for osteomyelitis  Right foot is likely source of bacteremia as per ID.  Wound culture and blood culture noted.  Repeat blood cultures final results with no growth.  Appreciate podiatry input.  Per podiatry right foot wound not source of infection?  Patient does not want below-knee amputation.  Continue midodrine  Continue p.o. digoxin  Continue long-acting p.o. Cardizem.    Resumed home metoprolol at increased dose, titrate to effect  IV and oral narcotics for pain control.  Finished as needed Toradol.  Started on continue Lyrica.  Visine eyedrops   MRSA PCR.,  Strep pneumonia and Legionella antigen negative  Continue basal bolus insulin therapy.  Although will further adjust patient's Levemir.  We will increase to 25 units twice daily.  Previous home dose was 60 units of Levemir daily.  Continue wound care recommendations.    Continue PT/OT    Reviewed patients labs and imaging, and discussed with patient and nurse at bedside.    DVT prophylaxis:  Medical DVT prophylaxis orders are present.  Home Eliquis    CODE STATUS:   Level Of Support Discussed With: Patient  Code Status (Patient  has no pulse and is not breathing): CPR (Attempt to Resuscitate)  Medical Interventions (Patient has pulse or is breathing): Full Support        Electronically signed by Max Mehta MD, 05/21/23, 10:56 AM EDT.

## 2023-05-22 PROBLEM — B35.1 ONYCHOMYCOSIS: Status: ACTIVE | Noted: 2023-05-22

## 2023-05-22 PROBLEM — M79.672 FOOT PAIN, LEFT: Status: ACTIVE | Noted: 2023-05-22

## 2023-05-22 LAB
ALBUMIN SERPL-MCNC: 2.3 G/DL (ref 3.5–5.2)
ALBUMIN/GLOB SERPL: 0.5 G/DL
ALP SERPL-CCNC: 623 U/L (ref 39–117)
ALT SERPL W P-5'-P-CCNC: 37 U/L (ref 1–41)
ANION GAP SERPL CALCULATED.3IONS-SCNC: 5.2 MMOL/L (ref 5–15)
AST SERPL-CCNC: 64 U/L (ref 1–40)
BASOPHILS # BLD AUTO: 0.03 10*3/MM3 (ref 0–0.2)
BASOPHILS NFR BLD AUTO: 0.4 % (ref 0–1.5)
BILIRUB SERPL-MCNC: 0.7 MG/DL (ref 0–1.2)
BUN SERPL-MCNC: 10 MG/DL (ref 8–23)
BUN/CREAT SERPL: 18.9 (ref 7–25)
CALCIUM SPEC-SCNC: 8.6 MG/DL (ref 8.6–10.5)
CHLORIDE SERPL-SCNC: 102 MMOL/L (ref 98–107)
CO2 SERPL-SCNC: 30.8 MMOL/L (ref 22–29)
CREAT SERPL-MCNC: 0.53 MG/DL (ref 0.76–1.27)
DEPRECATED RDW RBC AUTO: 46.8 FL (ref 37–54)
EGFRCR SERPLBLD CKD-EPI 2021: 111.9 ML/MIN/1.73
EOSINOPHIL # BLD AUTO: 0.07 10*3/MM3 (ref 0–0.4)
EOSINOPHIL NFR BLD AUTO: 0.9 % (ref 0.3–6.2)
ERYTHROCYTE [DISTWIDTH] IN BLOOD BY AUTOMATED COUNT: 16.4 % (ref 12.3–15.4)
GLOBULIN UR ELPH-MCNC: 4.4 GM/DL
GLUCOSE BLDC GLUCOMTR-MCNC: 194 MG/DL (ref 70–99)
GLUCOSE BLDC GLUCOMTR-MCNC: 204 MG/DL (ref 70–99)
GLUCOSE BLDC GLUCOMTR-MCNC: 220 MG/DL (ref 70–99)
GLUCOSE BLDC GLUCOMTR-MCNC: 235 MG/DL (ref 70–99)
GLUCOSE SERPL-MCNC: 205 MG/DL (ref 65–99)
HCT VFR BLD AUTO: 37 % (ref 37.5–51)
HGB BLD-MCNC: 11.3 G/DL (ref 13–17.7)
IMM GRANULOCYTES # BLD AUTO: 0.05 10*3/MM3 (ref 0–0.05)
IMM GRANULOCYTES NFR BLD AUTO: 0.7 % (ref 0–0.5)
LYMPHOCYTES # BLD AUTO: 1.12 10*3/MM3 (ref 0.7–3.1)
LYMPHOCYTES NFR BLD AUTO: 14.8 % (ref 19.6–45.3)
MAGNESIUM SERPL-MCNC: 1.7 MG/DL (ref 1.6–2.4)
MCH RBC QN AUTO: 24.4 PG (ref 26.6–33)
MCHC RBC AUTO-ENTMCNC: 30.5 G/DL (ref 31.5–35.7)
MCV RBC AUTO: 79.9 FL (ref 79–97)
MONOCYTES # BLD AUTO: 0.6 10*3/MM3 (ref 0.1–0.9)
MONOCYTES NFR BLD AUTO: 7.9 % (ref 5–12)
NEUTROPHILS NFR BLD AUTO: 5.7 10*3/MM3 (ref 1.7–7)
NEUTROPHILS NFR BLD AUTO: 75.3 % (ref 42.7–76)
NRBC BLD AUTO-RTO: 0 /100 WBC (ref 0–0.2)
PHOSPHATE SERPL-MCNC: 3.4 MG/DL (ref 2.5–4.5)
PLATELET # BLD AUTO: 257 10*3/MM3 (ref 140–450)
PMV BLD AUTO: 9.8 FL (ref 6–12)
POTASSIUM SERPL-SCNC: 4.3 MMOL/L (ref 3.5–5.2)
PROT SERPL-MCNC: 6.7 G/DL (ref 6–8.5)
RBC # BLD AUTO: 4.63 10*6/MM3 (ref 4.14–5.8)
SODIUM SERPL-SCNC: 138 MMOL/L (ref 136–145)
WBC NRBC COR # BLD: 7.57 10*3/MM3 (ref 3.4–10.8)

## 2023-05-22 PROCEDURE — 83735 ASSAY OF MAGNESIUM: CPT | Performed by: INTERNAL MEDICINE

## 2023-05-22 PROCEDURE — 84100 ASSAY OF PHOSPHORUS: CPT | Performed by: INTERNAL MEDICINE

## 2023-05-22 PROCEDURE — 97110 THERAPEUTIC EXERCISES: CPT

## 2023-05-22 PROCEDURE — 99232 SBSQ HOSP IP/OBS MODERATE 35: CPT | Performed by: INTERNAL MEDICINE

## 2023-05-22 PROCEDURE — 82948 REAGENT STRIP/BLOOD GLUCOSE: CPT

## 2023-05-22 PROCEDURE — 63710000001 INSULIN LISPRO (HUMAN) PER 5 UNITS

## 2023-05-22 PROCEDURE — 85025 COMPLETE CBC W/AUTO DIFF WBC: CPT | Performed by: INTERNAL MEDICINE

## 2023-05-22 PROCEDURE — 63710000001 INSULIN DETEMIR PER 5 UNITS: Performed by: INTERNAL MEDICINE

## 2023-05-22 PROCEDURE — 97530 THERAPEUTIC ACTIVITIES: CPT

## 2023-05-22 PROCEDURE — 80053 COMPREHEN METABOLIC PANEL: CPT | Performed by: INTERNAL MEDICINE

## 2023-05-22 PROCEDURE — 94799 UNLISTED PULMONARY SVC/PX: CPT

## 2023-05-22 RX ORDER — CYCLOBENZAPRINE HCL 10 MG
10 TABLET ORAL 3 TIMES DAILY PRN
Status: DISCONTINUED | OUTPATIENT
Start: 2023-05-22 | End: 2023-06-15 | Stop reason: HOSPADM

## 2023-05-22 RX ADMIN — INSULIN DETEMIR 25 UNITS: 100 INJECTION, SOLUTION SUBCUTANEOUS at 22:34

## 2023-05-22 RX ADMIN — INSULIN LISPRO 2 UNITS: 100 INJECTION, SOLUTION INTRAVENOUS; SUBCUTANEOUS at 22:44

## 2023-05-22 RX ADMIN — PREGABALIN 25 MG: 25 CAPSULE ORAL at 05:50

## 2023-05-22 RX ADMIN — AMOXICILLIN 1000 MG: 500 CAPSULE ORAL at 05:50

## 2023-05-22 RX ADMIN — FAMOTIDINE 40 MG: 20 TABLET ORAL at 09:34

## 2023-05-22 RX ADMIN — ACETAMINOPHEN 1000 MG: 325 TABLET ORAL at 05:50

## 2023-05-22 RX ADMIN — OXYCODONE HYDROCHLORIDE 10 MG: 5 TABLET ORAL at 18:10

## 2023-05-22 RX ADMIN — DILTIAZEM HYDROCHLORIDE 120 MG: 120 CAPSULE, COATED, EXTENDED RELEASE ORAL at 09:34

## 2023-05-22 RX ADMIN — PREGABALIN 25 MG: 25 CAPSULE ORAL at 22:35

## 2023-05-22 RX ADMIN — INSULIN LISPRO 3 UNITS: 100 INJECTION, SOLUTION INTRAVENOUS; SUBCUTANEOUS at 11:36

## 2023-05-22 RX ADMIN — DIGOXIN 125 MCG: 125 TABLET ORAL at 11:36

## 2023-05-22 RX ADMIN — METOPROLOL SUCCINATE 50 MG: 50 TABLET, EXTENDED RELEASE ORAL at 22:35

## 2023-05-22 RX ADMIN — METOPROLOL SUCCINATE 50 MG: 50 TABLET, EXTENDED RELEASE ORAL at 09:34

## 2023-05-22 RX ADMIN — ATORVASTATIN CALCIUM 10 MG: 10 TABLET, FILM COATED ORAL at 22:35

## 2023-05-22 RX ADMIN — OXYCODONE HYDROCHLORIDE 10 MG: 5 TABLET ORAL at 02:50

## 2023-05-22 RX ADMIN — AMOXICILLIN 1000 MG: 500 CAPSULE ORAL at 22:34

## 2023-05-22 RX ADMIN — CYCLOBENZAPRINE 10 MG: 10 TABLET, FILM COATED ORAL at 18:56

## 2023-05-22 RX ADMIN — MIDODRINE HYDROCHLORIDE 5 MG: 5 TABLET ORAL at 11:14

## 2023-05-22 RX ADMIN — PREGABALIN 25 MG: 25 CAPSULE ORAL at 14:21

## 2023-05-22 RX ADMIN — AMOXICILLIN 1000 MG: 500 CAPSULE ORAL at 14:21

## 2023-05-22 RX ADMIN — INSULIN LISPRO 3 UNITS: 100 INJECTION, SOLUTION INTRAVENOUS; SUBCUTANEOUS at 17:58

## 2023-05-22 RX ADMIN — INSULIN DETEMIR 25 UNITS: 100 INJECTION, SOLUTION SUBCUTANEOUS at 09:35

## 2023-05-22 RX ADMIN — OXYCODONE HYDROCHLORIDE 10 MG: 5 TABLET ORAL at 10:13

## 2023-05-22 RX ADMIN — DOCUSATE SODIUM 50MG AND SENNOSIDES 8.6MG 2 TABLET: 8.6; 5 TABLET, FILM COATED ORAL at 09:34

## 2023-05-22 RX ADMIN — APIXABAN 5 MG: 5 TABLET, FILM COATED ORAL at 09:34

## 2023-05-22 RX ADMIN — APIXABAN 5 MG: 5 TABLET, FILM COATED ORAL at 22:35

## 2023-05-22 RX ADMIN — ACETAMINOPHEN 1000 MG: 325 TABLET ORAL at 22:35

## 2023-05-22 RX ADMIN — MIDODRINE HYDROCHLORIDE 5 MG: 5 TABLET ORAL at 17:58

## 2023-05-22 RX ADMIN — MIDODRINE HYDROCHLORIDE 5 MG: 5 TABLET ORAL at 06:41

## 2023-05-22 RX ADMIN — ACETAMINOPHEN 1000 MG: 325 TABLET ORAL at 14:21

## 2023-05-22 RX ADMIN — INSULIN LISPRO 2 UNITS: 100 INJECTION, SOLUTION INTRAVENOUS; SUBCUTANEOUS at 09:34

## 2023-05-22 NOTE — THERAPY TREATMENT NOTE
Acute Care - Physical Therapy Treatment Note  KEO Dominguez     Patient Name: Jose Shaikh  : 1960  MRN: [id number]  Today's Date: 2023      Visit Dx:     ICD-10-CM ICD-9-CM   1. Acute febrile illness  R50.9 780.60   2. Sepsis without acute organ dysfunction, due to unspecified organism  A41.9 038.9     995.91   3. Pneumonia of right upper lobe due to infectious organism  J18.9 486   4. Uncontrolled type 2 diabetes mellitus with hyperglycemia  E11.65 250.02   5. Atrial fibrillation with rapid ventricular response  I48.91 427.31   6. Acute osteomyelitis of right foot  M86.171 730.07   7. Decreased activities of daily living (ADL)  Z78.9 V49.89   8. Difficulty in walking  R26.2 719.7     Patient Active Problem List   Diagnosis   • Diabetic ulcer of left heel associated with type 2 DM   • Acute osteomyelitis of left calcaneus    • Diabetic ulcer of left heel associated with type 2 DM   • Diabetic ulcer of right midfoot associated with type 2 DM   • Paroxysmal atrial fibrillation   • Essential hypertension   • Hyperlipidemia LDL goal <100   • Cellulitis and abscess of foot   • High alkaline phosphatase level   • Osteomyelitis   • Onychomycosis   • Onychocryptosis   • Foot pain, bilateral   • Osteomyelitis of foot, right, acute   • Cellulitis of right foot   • Type 2 diabetes mellitus, with long-term current use of insulin   • Class 3 severe obesity due to excess calories with serious comorbidity and body mass index (BMI) of 45.0 to 49.9 in adult   • Anxiety disorder, unspecified   • Claustrophobia   • Dependence on wheelchair   • Depression, unspecified   • Long term (current) use of anticoagulants   • Long term (current) use of oral hypoglycemic drugs   • Wound of foot   • Non-prs chronic ulcer oth prt r foot limited to brkdwn skin   • Orthostatic hypotension   • Other chronic osteomyelitis, right ankle and foot   • Personal history of nicotine dependence   • Thrombocytopenia, unspecified   • Unspecified  open wound, right foot, initial encounter   • Diabetic foot infection   • Subacute osteomyelitis of right foot   • Right foot pain   • Sepsis     Past Medical History:   Diagnosis Date   • Absence of toe of right foot    • Acute osteomyelitis of left calcaneus  8/18/2021   • Anxiety and depression    • Arthritis    • Claustrophobia    • Corns and callus    • Diabetic ulcer of left heel associated with type 2 DM 8/18/2021   • Diabetic ulcer of left heel associated with type 2 DM 7/6/2021   • Diabetic ulcer of right midfoot associated with type 2 DM 8/18/2021   • Difficulty walking    • Essential hypertension 8/31/2021   • Hammertoe    • Hyperlipidemia LDL goal <100 8/31/2021   • Ingrown toenail    • Obesity    • Paroxysmal atrial fibrillation 8/31/2021   • Polyneuropathy    • Pressure ulcer, stage 1    • Tinea unguium    • Type 2 diabetes mellitus with polyneuropathy      Past Surgical History:   Procedure Laterality Date   • CYST REMOVAL      center of back; benign   • INCISION AND DRAINAGE ABSCESS      back   • INCISION AND DRAINAGE LEG Right 12/10/2021    Procedure: INCISION AND DRAINAGE LOWER EXTREMITY;  Surgeon: Ash Leyva DPM;  Location: [facility]  Service: Podiatry;  Laterality: Right;   • OTHER SURGICAL HISTORY      Surgical clips left foot   • TOE SURGERY Right     Removal of 5th toe   • TRANS METATARSAL AMPUTATION Right 12/2/2021    Procedure: AMPUTATION TRANS METATARSAL;  Surgeon: Ash Leyva DPM;  Location: [facility]  Service: Podiatry;  Laterality: Right;   • WRIST SURGERY Left     repair of injury     PT Assessment (last 12 hours)     PT Evaluation and Treatment     Row Name 05/22/23 0904          Physical Therapy Time and Intention    Subjective Information complains of;weakness;fatigue;pain  -DK     Document Type therapy note (daily note)  -DK     Mode of Treatment individual therapy;physical therapy  -DK     Patient Effort good  -DK     Symptoms Noted During/After  Treatment fatigue;increased pain  -DK     Comment Pt was able to tolerate exercises and sitting EOB for a few minutes.  He did not feel up to attempting to stand this session.  Pt does report fear of falling / sliding off the side of the bed.  -     Row Name 05/22/23 0904          Pain    Pretreatment Pain Rating 4/10  -DK     Posttreatment Pain Rating 4/10  -DK     Pain Location generalized  -DK     Pain Location - back;foot  -DK     Pain Intervention(s) Repositioned;Ambulation/increased activity;Distraction;Therapeutic presence  -     Row Name 05/22/23 0904          Cognition    Affect/Mental Status (Cognition) WFL  -DK     Orientation Status (Cognition) oriented x 4  -DK     Follows Commands (Cognition) WFL  -DK     Cognitive Function WFL  -DK     Personal Safety Interventions nonskid shoes/slippers when out of bed;supervised activity  -     Row Name 05/22/23 0904          Mobility    Extremity Weight-bearing Status left lower extremity;right lower extremity  -DK     Left Lower Extremity (Weight-bearing Status) non weight-bearing (NWB)  Pt can be R heel/L forefoot WB as needed.  -DK     Right Lower Extremity (Weight-bearing Status) non weight-bearing (NWB)  Pt can be R heel/L forefoot WB as needed.  -     Row Name 05/22/23 0904          Bed Mobility    Bed Mobility supine-sit-supine;scooting/bridging;supine-sit;sit-supine  -DK     All Activities, [place] (Bed Mobility) contact guard;moderate assist (50% patient effort);1 person assist;2 person assist  -DK     Scooting/Bridging [place] (Bed Mobility) maximum assist (25% patient effort);2 person assist  -DK     Supine-Sit [place] (Bed Mobility) supervision;contact guard;1 person assist  -DK     Sit-Supine [place] (Bed Mobility) contact guard;minimum assist (75% patient effort);1 person assist  -DK     Supine-Sit-Supine [place] (Bed Mobility) contact guard;minimum assist (75% patient effort);1 person assist  -DK     Bed Mobility,  Safety Issues decreased use of arms for pushing/pulling;decreased use of legs for bridging/pushing  -     Assistive Device (Bed Mobility) bed rails;draw sheet  -     Comment, (Bed Mobility) Pt tolerated sitting EOB x 4-5 minutes with SBA.  He returned to bed on alert post treatment.  -     Row Name 05/22/23 0904          Safety Issues, Functional Mobility    Safety Issues Affecting Function (Mobility) safety precaution awareness  -     Impairments Affecting Function (Mobility) balance;endurance/activity tolerance;pain;range of motion (ROM);strength  -     Row Name 05/22/23 0904          Balance    Balance Assessment sitting static balance;sitting dynamic balance  -     Static Sitting Balance standby assist  -     Dynamic Sitting Balance standby assist  -     Position, Sitting Balance unsupported;sitting edge of bed  -     Balance Interventions sitting;static;dynamic  -     Row Name 05/22/23 0904          Motor Skills    Motor Skills --  therapeutic exercises  -     Coordination WFL  -     Therapeutic Exercise hip;knee;ankle  -     Row Name 05/22/23 0904          Hip (Therapeutic Exercise)    Hip (Therapeutic Exercise) AAROM (active assistive range of motion)  -     Hip AAROM (Therapeutic Exercise) bilateral;flexion;extension;aBduction;aDduction;supine;10 repetitions;2 sets  -     Row Name 05/22/23 0904          Knee (Therapeutic Exercise)    Knee (Therapeutic Exercise) AAROM (active assistive range of motion)  -     Knee AAROM (Therapeutic Exercise) bilateral;flexion;extension;supine;10 repetitions;2 sets  -     Row Name 05/22/23 0904          Ankle (Therapeutic Exercise)    Ankle (Therapeutic Exercise) AROM (active range of motion)  -     Ankle AROM (Therapeutic Exercise) bilateral;dorsiflexion;plantarflexion;supine;20 repititions  -     Row Name             Wound 12/02/21 Right anterior foot Incision    Wound - Properties Group Placement Date: 12/02/21  -ES Side: Right  -ES  Orientation: anterior  -ES Location: foot  -ES Primary Wound Type: Incision  -ES    Retired Wound - Properties Group Placement Date: 12/02/21  -ES Side: Right  -ES Orientation: anterior  -ES Location: foot  -ES Primary Wound Type: Incision  -ES    Retired Wound - Properties Group Date first assessed: 12/02/21  -ES Side: Right  -ES Location: foot  -ES Primary Wound Type: Incision  -ES    Row Name             Wound 06/22/21 1133 Left lateral heel    Wound - Properties Group Placement Date: 06/22/21  -FABIOLA Placement Time: 1133  -FABIOLA Present on Hospital Admission: Y  -FABIOLA Side: Left  -FABIOLA Orientation: lateral  -FABIOLA Location: heel  -FABIOLA    Retired Wound - Properties Group Placement Date: 06/22/21  -FABIOLA Placement Time: 1133  -FABIOLA Present on Hospital Admission: Y  -FABIOLA Side: Left  -FABIOLA Orientation: lateral  -FABIOLA Location: heel  -FABIOLA    Retired Wound - Properties Group Date first assessed: 06/22/21  -FABIOLA Time first assessed: 1133  -FABIOLA Present on Hospital Admission: Y  -FABIOLA Side: Left  -FABIOLA Location: heel  -FABIOLA    Row Name             Wound 05/19/23 1317 gluteal Blisters    Wound - Properties Group Placement Date: 05/19/23  -RASHARD Placement Time: 1317  -RASHARD Present on Hospital Admission: N  -RASHARD Location: gluteal  -RASHARD Primary Wound Type: Blisters  -RASHARD    Retired Wound - Properties Group Placement Date: 05/19/23  -RASHARD Placement Time: 1317  -RASHARD Present on Hospital Admission: N  -RASHARD Location: gluteal  -RASHARD Primary Wound Type: Blisters  -RASHARD    Retired Wound - Properties Group Date first assessed: 05/19/23  -RASHARD Time first assessed: 1317  -RASHARD Present on Hospital Admission: N  -RASHARD Location: gluteal  -RASHARD Primary Wound Type: Blisters  -RASHARD    Row Name 05/22/23 0904          Plan of Care Review    Plan of Care Reviewed With patient  -DK     Progress improving  -DK     Row Name 05/22/23 0904          Positioning and Restraints    Pre-Treatment Position in bed  -DK     Post Treatment Position bed  -DK     In Bed supine;call light within reach;encouraged to call  for assist;exit alarm on;legs elevated;heels elevated  side rails up x 4  -DK     Row Name 05/22/23 0904          Therapy Assessment/Plan (PT)    Rehab Potential (PT) fair, will monitor progress closely  -DK     Criteria for Skilled Interventions Met (PT) skilled treatment is necessary  -DK     Therapy Frequency (PT) daily  -DK     Problem List (PT) problems related to;balance;mobility;range of motion (ROM);strength;pain  -DK     Activity Limitations Related to Problem List (PT) unable to ambulate safely;unable to transfer safely  -DK     Row Name 05/22/23 0904          Progress Summary (PT)    Progress Toward Functional Goals (PT) progress toward functional goals is fair  -           User Key  (r) = Recorded By, (t) = Taken By, (c) = Cosigned By    Initials Name Provider Type    Karon Jodran, RN Registered Nurse    Mckenna Landaverde PTA Physical Therapist Assistant    Marlen Vaughn RN Registered Nurse    Katlyn Garcia RN Registered Nurse                  PT Recommendation and Plan  Planned Therapy Interventions (PT): balance training, bed mobility training, home exercise program, strengthening, transfer training  Therapy Frequency (PT): daily  Progress Summary (PT)  Progress Toward Functional Goals (PT): progress toward functional goals is fair  Plan of Care Reviewed With: patient  Progress: improving   Outcome Measures     Row Name 05/22/23 0904 05/19/23 1200          How much help from another person do you currently need...    Turning from your back to your side while in flat bed without using bedrails? 3  -DK 3  -CS     Moving from lying on back to sitting on the side of a flat bed without bedrails? 3  -DK 2  -CS     Moving to and from a bed to a chair (including a wheelchair)? 1  -DK 2  -CS     Standing up from a chair using your arms (e.g., wheelchair, bedside chair)? 1  -DK 2  -CS     Climbing 3-5 steps with a railing? 1  -DK 1  -CS     To walk in hospital room? 1  -DK 1  -CS     AM-PAC  6 Clicks Score (PT) 10  -DK 11  -CS        Functional Assessment    Outcome Measure Options AM-PAC 6 Clicks Basic Mobility (PT)  -DK AM-PAC 6 Clicks Basic Mobility (PT)  -CS           User Key  (r) = Recorded By, (t) = Taken By, (c) = Cosigned By    Initials Name Provider Type    Mckenna Landaverde PTA Physical Therapist Assistant    Ronald Huerta PTA Physical Therapist Assistant                 Time Calculation:    PT Charges     Row Name 05/22/23 0912             Time Calculation    PT Received On 05/22/23  -DK      PT Goal Re-Cert Due Date 05/25/23  -DK         Timed Charges    24685 - PT Therapeutic Exercise Minutes 14  -DK      75783 - PT Therapeutic Activity Minutes 12  -DK         Total Minutes    Timed Charges Total Minutes 26  -DK       Total Minutes 26  -DK            User Key  (r) = Recorded By, (t) = Taken By, (c) = Cosigned By    Initials Name Provider Type    Mckenna Landaverde, CURTIS Physical Therapist Assistant              Therapy Charges for Today     Code Description Service Date Service Provider Modifiers Qty    72481931507 HC PT THER PROC EA 15 MIN 5/22/2023 Mckenna Wong, CURTIS GP 1    01908263304 HC PT THERAPEUTIC ACT EA 15 MIN 5/22/2023 Mckenna Wong, PTA GP 1          PT G-Codes  Outcome Measure Options: AM-PAC 6 Clicks Basic Mobility (PT)  AM-PAC 6 Clicks Score (PT): 10  AM-PAC 6 Clicks Score (OT): 14    Mckenna Wong PTA  5/22/2023

## 2023-05-22 NOTE — PROGRESS NOTES
[facility]   Hospitalist Progress Note  Date: 2023  Patient Name: Jose Shaikh  : 1960  MRN: [id number]  Date of admission: 2023      Subjective   Subjective     Chief Complaint: Left hip pain    Summary:   Jose Shaikh is a 62 y.o. male past medical history of osteomyelitis, type 2 diabetes, hypertension, A-fib, dyslipidemia, obesity with BMI of 48, who was recently hospital for osteomyelitis and he returns due to hip pain.        Patient was recently admitted to the hospital in early April for foot infection.  There was recommendation for amputation due to osteomyelitis in the foot and the patient refused.  He was unable to be placed at that time.  He is a sex offender making it very difficult for placement although [facility] was an option.  As result, he opted to go home on oral antibiotics to treat Alcaligenes facialis and Morganella morganii with doxycycline and ciprofloxacin.  The patient was seen in wound care in middle of April and at that time Dr. Daniels attempted to switch antibiotics to Levaquin per pharmacy recommendations based off culture data but was unable to get hold the patient so he was never switched.  The patient states the only reason he came to the emergency department was because his left hip was hurting.  Unaware of fevers.  Chills.  Cough.  Shortness of breath.  The patient came to the ER for hip pain.        In the emergency department the patient's vital signs are as follows temperature is 103.4, pulse is 139, blood pressure is 92/65, 95% on room air.  CBC shows a white blood cell count of 20.55 with neutrophils of 88.2.  CRP is 20.53, which is up from 7.4 in April and lactate is 2.6.  Sed rate was 42 in April and is currently 75.  Bicarb is 19.9 and sodium is 130.  Urinalysis shows positive nitrite and small leuk esterase but no bacteria.  Patient also seems to have a pneumonia in his right upper lobe.  X-ray of the right foot shows evidence of  osteomyelitis involving the distal aspect of the cuboid and gas in the soft tissue along the plantar aspect of the foot just proximal amputation site and periostitis involving the cuneiform.  Patient got 1 L of normal saline.  Patient received Zosyn and vancomycin for antibiotics.  Podiatry was consulted and did not feel like the foot was the source.  Patient will be admitted to the hospital for severe sepsis with unclear source at this time.  The source could be pneumonia, urinary tract infection or most likely the foot.  Patient will also be managed with A-fib with RVR.  Patient blood culture grew Streptococcus agalactiae.  Zyvox DC'd.  Patient does not want any amputation..  Patient wound culture from right foot also grew Streptococcus.  X-ray of pelvis and hip shows DJD of bilateral hips left worse than right.  Patient refused MRI of the left hip and order was canceled as patient is very claustrophobic.  Per ID recommendation needs 6 weeks of oral amoxicillin for osteomyelitis of right foot.    Interval Followup: No acute events overnight, patient complaining of neck pain    Objective   Objective     Vitals:   Temp:  [97.3 °F (36.3 °C)-98.5 °F (36.9 °C)] 97.7 °F (36.5 °C)  Heart Rate:  [65-73] 71  Resp:  [18] 18  BP: (104-123)/(52-72) 104/61  Physical Exam      GEN: No acute distress  HEENT: Moist mucous membranes  LUNGS: Equal chest rise bilaterally  CARDIAC: Regular rate and rhythm  NEURO: Moving all 4 extremities spontaneously     Result Review    Result Review:  I have personally reviewed the results for the past 24 hours and agree with these findings:  [x]  Laboratory  [x]  Microbiology  [x]  Radiology  [x]  EKG/Telemetry   []  Cardiology/Vascular   []  Pathology  [x]  Old records  [x]  Other: Medications    Assessment & Plan   Assessment / Plan     Assessment:  Sepsis present on admission.  Patient presented with fever, tachycardia, lactic acidosis, elevated procalcitonin and leukocytosis.  Resolved  Right  upper lobe healthcare associated pneumonia .  Streptococcus agalactiae bacteremia.  Subsequent cultures negative  Chronic osteomyelitis of right foot involving cuboid and may be cuneiform.  On p.o. amoxicillin for 6 weeks  Cellulitis of right foot due to Streptococcus agalactiae.  Paroxysmal A-fib with intermittent RVR on Eliquis.  Bilateral diabetic foot ulcers.  NIDDM.  Most recent A1c 6.6 in April 2023  Morbid obesity BMI of 48.1.  S/p TMA of right foot.  Hyponatremia.  likely from hyperglycemia/volume overload.  Clinically significant.  Improved  Hypophosphatemia.  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  Chronic shoulder neck pain    Plan:  Patient refused MRI of left hip.  X-ray showed arthritis  Continue to fluid restriction for hyponatremia.  Overall improved  Finished 6 days of Rocephin, continue 6 weeks of oral Amoxil for osteomyelitis  Right foot is likely source of bacteremia as per ID.  Wound culture and blood culture noted.  Repeat blood cultures final results with no growth.  Appreciate podiatry input.  Per podiatry right foot wound not source of infection?  Patient does not want below-knee amputation, will start treatment with antibiotics for now  Continue midodrine  Continue p.o. digoxin  Continue long-acting p.o. Cardizem.    Resumed home metoprolol at increased dose, titrate to effect  IV and oral narcotics for pain control.  Finished as needed Toradol.  Started on continue Lyrica.  Visine eyedrops   MRSA PCR.,  Strep pneumonia and Legionella antigen negative  Continue basal bolus insulin therapy.  Although will further adjust patient's Levemir.  We will increase to 25 units twice daily.  Previous home dose was 60 units of Levemir daily.  Heating pad to neck and shoulder pain areas  Continue wound care recommendations.    Continue PT/OT    Reviewed patients labs and imaging, and discussed with patient and nurse at bedside.    DVT prophylaxis:  Medical DVT prophylaxis orders are  present.  Home Eliquis    CODE STATUS:   Level Of Support Discussed With: Patient  Code Status (Patient has no pulse and is not breathing): CPR (Attempt to Resuscitate)  Medical Interventions (Patient has pulse or is breathing): Full Support        Electronically signed by Max Mehta MD, 05/22/23, 11:05 AM EDT.

## 2023-05-22 NOTE — PLAN OF CARE
Goal Outcome Evaluation:   Patient alert and oriented x 4. Medicated with prn pain medication per mar instructions for back and foot pain. Continuing fluid restriction.

## 2023-05-23 LAB
ALBUMIN SERPL-MCNC: 2 G/DL (ref 3.5–5.2)
ALBUMIN/GLOB SERPL: 0.4 G/DL
ALP SERPL-CCNC: 566 U/L (ref 39–117)
ALT SERPL W P-5'-P-CCNC: 34 U/L (ref 1–41)
ANION GAP SERPL CALCULATED.3IONS-SCNC: 7.9 MMOL/L (ref 5–15)
AST SERPL-CCNC: 49 U/L (ref 1–40)
BASOPHILS # BLD AUTO: 0.05 10*3/MM3 (ref 0–0.2)
BASOPHILS NFR BLD AUTO: 0.7 % (ref 0–1.5)
BILIRUB SERPL-MCNC: 0.6 MG/DL (ref 0–1.2)
BUN SERPL-MCNC: 10 MG/DL (ref 8–23)
BUN/CREAT SERPL: 16.9 (ref 7–25)
CALCIUM SPEC-SCNC: 8.5 MG/DL (ref 8.6–10.5)
CHLORIDE SERPL-SCNC: 101 MMOL/L (ref 98–107)
CO2 SERPL-SCNC: 30.1 MMOL/L (ref 22–29)
CREAT SERPL-MCNC: 0.59 MG/DL (ref 0.76–1.27)
DEPRECATED RDW RBC AUTO: 47.6 FL (ref 37–54)
EGFRCR SERPLBLD CKD-EPI 2021: 108.3 ML/MIN/1.73
EOSINOPHIL # BLD AUTO: 0.09 10*3/MM3 (ref 0–0.4)
EOSINOPHIL NFR BLD AUTO: 1.3 % (ref 0.3–6.2)
ERYTHROCYTE [DISTWIDTH] IN BLOOD BY AUTOMATED COUNT: 16.4 % (ref 12.3–15.4)
GLOBULIN UR ELPH-MCNC: 4.6 GM/DL
GLUCOSE BLDC GLUCOMTR-MCNC: 143 MG/DL (ref 70–99)
GLUCOSE BLDC GLUCOMTR-MCNC: 227 MG/DL (ref 70–99)
GLUCOSE BLDC GLUCOMTR-MCNC: 244 MG/DL (ref 70–99)
GLUCOSE BLDC GLUCOMTR-MCNC: 355 MG/DL (ref 70–99)
GLUCOSE SERPL-MCNC: 147 MG/DL (ref 65–99)
HCT VFR BLD AUTO: 33.9 % (ref 37.5–51)
HGB BLD-MCNC: 10.3 G/DL (ref 13–17.7)
IMM GRANULOCYTES # BLD AUTO: 0.04 10*3/MM3 (ref 0–0.05)
IMM GRANULOCYTES NFR BLD AUTO: 0.6 % (ref 0–0.5)
LYMPHOCYTES # BLD AUTO: 1.16 10*3/MM3 (ref 0.7–3.1)
LYMPHOCYTES NFR BLD AUTO: 17 % (ref 19.6–45.3)
MAGNESIUM SERPL-MCNC: 1.6 MG/DL (ref 1.6–2.4)
MCH RBC QN AUTO: 24.5 PG (ref 26.6–33)
MCHC RBC AUTO-ENTMCNC: 30.4 G/DL (ref 31.5–35.7)
MCV RBC AUTO: 80.5 FL (ref 79–97)
MONOCYTES # BLD AUTO: 0.54 10*3/MM3 (ref 0.1–0.9)
MONOCYTES NFR BLD AUTO: 7.9 % (ref 5–12)
NEUTROPHILS NFR BLD AUTO: 4.95 10*3/MM3 (ref 1.7–7)
NEUTROPHILS NFR BLD AUTO: 72.5 % (ref 42.7–76)
NRBC BLD AUTO-RTO: 0 /100 WBC (ref 0–0.2)
PHOSPHATE SERPL-MCNC: 3.6 MG/DL (ref 2.5–4.5)
PLATELET # BLD AUTO: 234 10*3/MM3 (ref 140–450)
PMV BLD AUTO: 10.7 FL (ref 6–12)
POTASSIUM SERPL-SCNC: 3.8 MMOL/L (ref 3.5–5.2)
PROT SERPL-MCNC: 6.6 G/DL (ref 6–8.5)
RBC # BLD AUTO: 4.21 10*6/MM3 (ref 4.14–5.8)
SODIUM SERPL-SCNC: 139 MMOL/L (ref 136–145)
WBC NRBC COR # BLD: 6.83 10*3/MM3 (ref 3.4–10.8)

## 2023-05-23 PROCEDURE — 97110 THERAPEUTIC EXERCISES: CPT

## 2023-05-23 PROCEDURE — 80053 COMPREHEN METABOLIC PANEL: CPT | Performed by: INTERNAL MEDICINE

## 2023-05-23 PROCEDURE — 94799 UNLISTED PULMONARY SVC/PX: CPT

## 2023-05-23 PROCEDURE — 83735 ASSAY OF MAGNESIUM: CPT | Performed by: INTERNAL MEDICINE

## 2023-05-23 PROCEDURE — 94760 N-INVAS EAR/PLS OXIMETRY 1: CPT

## 2023-05-23 PROCEDURE — 63710000001 INSULIN DETEMIR PER 5 UNITS: Performed by: INTERNAL MEDICINE

## 2023-05-23 PROCEDURE — 84100 ASSAY OF PHOSPHORUS: CPT | Performed by: INTERNAL MEDICINE

## 2023-05-23 PROCEDURE — 99232 SBSQ HOSP IP/OBS MODERATE 35: CPT | Performed by: INTERNAL MEDICINE

## 2023-05-23 PROCEDURE — 63710000001 INSULIN LISPRO (HUMAN) PER 5 UNITS

## 2023-05-23 PROCEDURE — 82948 REAGENT STRIP/BLOOD GLUCOSE: CPT

## 2023-05-23 PROCEDURE — 85025 COMPLETE CBC W/AUTO DIFF WBC: CPT | Performed by: INTERNAL MEDICINE

## 2023-05-23 RX ADMIN — MIDODRINE HYDROCHLORIDE 5 MG: 5 TABLET ORAL at 11:44

## 2023-05-23 RX ADMIN — ACETAMINOPHEN 1000 MG: 325 TABLET ORAL at 06:25

## 2023-05-23 RX ADMIN — APIXABAN 5 MG: 5 TABLET, FILM COATED ORAL at 21:31

## 2023-05-23 RX ADMIN — PREGABALIN 25 MG: 25 CAPSULE ORAL at 21:31

## 2023-05-23 RX ADMIN — INSULIN LISPRO 3 UNITS: 100 INJECTION, SOLUTION INTRAVENOUS; SUBCUTANEOUS at 17:34

## 2023-05-23 RX ADMIN — MIDODRINE HYDROCHLORIDE 5 MG: 5 TABLET ORAL at 06:54

## 2023-05-23 RX ADMIN — INSULIN LISPRO 3 UNITS: 100 INJECTION, SOLUTION INTRAVENOUS; SUBCUTANEOUS at 11:50

## 2023-05-23 RX ADMIN — APIXABAN 5 MG: 5 TABLET, FILM COATED ORAL at 08:39

## 2023-05-23 RX ADMIN — OXYCODONE HYDROCHLORIDE 10 MG: 5 TABLET ORAL at 00:42

## 2023-05-23 RX ADMIN — METOPROLOL SUCCINATE 50 MG: 50 TABLET, EXTENDED RELEASE ORAL at 08:39

## 2023-05-23 RX ADMIN — DILTIAZEM HYDROCHLORIDE 120 MG: 120 CAPSULE, COATED, EXTENDED RELEASE ORAL at 08:39

## 2023-05-23 RX ADMIN — ATORVASTATIN CALCIUM 10 MG: 10 TABLET, FILM COATED ORAL at 21:31

## 2023-05-23 RX ADMIN — DIGOXIN 125 MCG: 125 TABLET ORAL at 11:45

## 2023-05-23 RX ADMIN — INSULIN LISPRO 6 UNITS: 100 INJECTION, SOLUTION INTRAVENOUS; SUBCUTANEOUS at 21:31

## 2023-05-23 RX ADMIN — AMOXICILLIN 1000 MG: 500 CAPSULE ORAL at 21:31

## 2023-05-23 RX ADMIN — OXYCODONE HYDROCHLORIDE 10 MG: 5 TABLET ORAL at 11:44

## 2023-05-23 RX ADMIN — INSULIN DETEMIR 25 UNITS: 100 INJECTION, SOLUTION SUBCUTANEOUS at 08:39

## 2023-05-23 RX ADMIN — AMOXICILLIN 1000 MG: 500 CAPSULE ORAL at 06:25

## 2023-05-23 RX ADMIN — FAMOTIDINE 40 MG: 20 TABLET ORAL at 08:39

## 2023-05-23 RX ADMIN — OXYCODONE HYDROCHLORIDE 10 MG: 5 TABLET ORAL at 22:01

## 2023-05-23 RX ADMIN — INSULIN DETEMIR 25 UNITS: 100 INJECTION, SOLUTION SUBCUTANEOUS at 21:31

## 2023-05-23 RX ADMIN — ACETAMINOPHEN 1000 MG: 325 TABLET ORAL at 21:31

## 2023-05-23 RX ADMIN — CYCLOBENZAPRINE 10 MG: 10 TABLET, FILM COATED ORAL at 02:54

## 2023-05-23 RX ADMIN — PREGABALIN 25 MG: 25 CAPSULE ORAL at 13:37

## 2023-05-23 RX ADMIN — PREGABALIN 25 MG: 25 CAPSULE ORAL at 06:25

## 2023-05-23 RX ADMIN — AMOXICILLIN 1000 MG: 500 CAPSULE ORAL at 13:37

## 2023-05-23 RX ADMIN — MIDODRINE HYDROCHLORIDE 5 MG: 5 TABLET ORAL at 17:34

## 2023-05-23 RX ADMIN — ACETAMINOPHEN 1000 MG: 325 TABLET ORAL at 13:37

## 2023-05-23 RX ADMIN — CYCLOBENZAPRINE 10 MG: 10 TABLET, FILM COATED ORAL at 11:44

## 2023-05-23 NOTE — PLAN OF CARE
Goal Outcome Evaluation:      Patient remains a&ox4. Medicated with prn pain medication for back, hip, and shoulder pain. Blood glucose monitored per orders; insulin administered per MAR instructions. Fluid restriction continued.

## 2023-05-23 NOTE — PROGRESS NOTES
[facility]   Hospitalist Progress Note  Date: 2023  Patient Name: Jose Shaikh  : 1960  MRN: [id number]  Date of admission: 2023      Subjective   Subjective     Chief Complaint: Left hip pain    Summary:   Jose Shaikh is a 62 y.o. male past medical history of osteomyelitis, type 2 diabetes, hypertension, A-fib, dyslipidemia, obesity with BMI of 48, who was recently hospital for osteomyelitis and he returns due to hip pain.      Patient was recently admitted to the hospital in early April for foot infection.  There was recommendation for amputation due to osteomyelitis in the foot and the patient refused.  He was unable to be placed at that time.  He is a sex offender making it very difficult for placement although [facility] was an option.  As result, he opted to go home on oral antibiotics to treat Alcaligenes facialis and Morganella morganii with doxycycline and ciprofloxacin.  The patient was seen in wound care in middle of April and at that time Dr. Daniels attempted to switch antibiotics to Levaquin per pharmacy recommendations based off culture data but was unable to get hold the patient so he was never switched.  The patient states the only reason he came to the emergency department was because his left hip was hurting.  Unaware of fevers.  Chills.  Cough.  Shortness of breath.  The patient came to the ER for hip pain.    Patient blood culture grew Streptococcus agalactiae.  Zyvox DC'd.  Patient does not want any amputation.  Patient wound culture from right foot also grew Streptococcus.  X-ray of pelvis and hip shows DJD of bilateral hips left worse than right.  Patient refused MRI of the left hip and order was canceled as patient is very claustrophobic.  Per ID recommendation needs 6 weeks of oral amoxicillin for osteomyelitis of right foot.  Patient is doing well, he is weak and debilitated however and Social work is arranging placement in a rehab facility for  patient    Interval Followup: No acute events overnight, patient resting comfortably in bed    Objective   Objective     Vitals:   Temp:  [97.5 °F (36.4 °C)-97.9 °F (36.6 °C)] 97.5 °F (36.4 °C)  Heart Rate:  [62-68] 62  Resp:  [16-20] 20  BP: (112-133)/(53-64) 112/55  Physical Exam      GEN: No acute distress  HEENT: Moist mucous membranes  LUNGS: Equal chest rise bilaterally  CARDIAC: Regular rate and rhythm  NEURO: Moving all 4 extremities spontaneously     Result Review    Result Review:  I have personally reviewed the results for the past 24 hours and agree with these findings:  [x]  Laboratory  [x]  Microbiology  [x]  Radiology  [x]  EKG/Telemetry   []  Cardiology/Vascular   []  Pathology  [x]  Old records  [x]  Other: Medications    Assessment & Plan   Assessment / Plan     Assessment:  Sepsis present on admission.  Patient presented with fever, tachycardia, lactic acidosis, elevated procalcitonin and leukocytosis.  Resolved  Right upper lobe healthcare associated pneumonia .  Streptococcus agalactiae bacteremia.  Subsequent cultures negative  Chronic osteomyelitis of right foot involving cuboid and may be cuneiform.  On p.o. amoxicillin for 6 weeks  Cellulitis of right foot due to Streptococcus agalactiae.  Paroxysmal A-fib with intermittent RVR on Eliquis.  Bilateral diabetic foot ulcers.  NIDDM.  Most recent A1c 6.6 in April 2023  Morbid obesity BMI of 48.1.  S/p TMA of right foot.  Hyponatremia.  likely from hyperglycemia/volume overload.  Clinically significant.  Improved  Hypophosphatemia.  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  Chronic shoulder neck pain    Plan:  Patient refused MRI of left hip.  X-ray showed arthritis  Continue to fluid restriction for hyponatremia.  Overall improved  Finished 6 days of Rocephin, continue 6 weeks of oral Amoxil for osteomyelitis  Right foot is likely source of bacteremia as per ID.  Wound culture and blood culture noted.  Repeat blood cultures  final results with no growth.  Appreciate podiatry input.  Per podiatry right foot wound not source of infection?  Patient does not want below-knee amputation, will start treatment with antibiotics for now  Continue midodrine  Continue p.o. digoxin  Continue long-acting p.o. Cardizem.    Resumed home metoprolol at increased dose, titrate to effect  IV and oral narcotics for pain control.  Finished as needed Toradol.  Started on continue Lyrica.  Visine eyedrops   MRSA PCR.,  Strep pneumonia and Legionella antigen negative  Continue basal bolus insulin continue increased dose of Levemir to to 25 units twice daily.  Previous home dose was 60 units of Levemir daily.  Heating pad to neck and shoulder pain areas, pain improving  Add Flexeril for muscle spasm  Continue wound care recommendations.    Continue PT/OT    Reviewed patients labs and imaging, and discussed with patient and nurse at bedside.    DVT prophylaxis:  Medical DVT prophylaxis orders are present.  Home Eliquis    CODE STATUS:   Level Of Support Discussed With: Patient  Code Status (Patient has no pulse and is not breathing): CPR (Attempt to Resuscitate)  Medical Interventions (Patient has pulse or is breathing): Full Support    Electronically signed by Max Mehta MD, 05/23/23, 10:16 AM EDT.

## 2023-05-23 NOTE — THERAPY TREATMENT NOTE
Acute Care - Physical Therapy Treatment Note  [facility]     Patient Name: Jose Shaikh  : 1960  MRN: [id number]  Today's Date: 2023      Visit Dx:     ICD-10-CM ICD-9-CM   1. Acute febrile illness  R50.9 780.60   2. Sepsis without acute organ dysfunction, due to unspecified organism  A41.9 038.9     995.91   3. Pneumonia of right upper lobe due to infectious organism  J18.9 486   4. Uncontrolled type 2 diabetes mellitus with hyperglycemia  E11.65 250.02   5. Atrial fibrillation with rapid ventricular response  I48.91 427.31   6. Acute osteomyelitis of right foot  M86.171 730.07   7. Decreased activities of daily living (ADL)  Z78.9 V49.89   8. Difficulty in walking  R26.2 719.7     Patient Active Problem List   Diagnosis   • Diabetic ulcer of left heel associated with type 2 DM   • Acute osteomyelitis of left calcaneus    • Diabetic ulcer of left heel associated with type 2 DM   • Diabetic ulcer of right midfoot associated with type 2 DM   • Paroxysmal atrial fibrillation   • Essential hypertension   • Hyperlipidemia LDL goal <100   • Cellulitis and abscess of foot   • High alkaline phosphatase level   • Osteomyelitis   • Onychomycosis   • Onychocryptosis   • Foot pain, bilateral   • Osteomyelitis of foot, right, acute   • Cellulitis of right foot   • Type 2 diabetes mellitus, with long-term current use of insulin   • Class 3 severe obesity due to excess calories with serious comorbidity and body mass index (BMI) of 45.0 to 49.9 in adult   • Anxiety disorder, unspecified   • Claustrophobia   • Dependence on wheelchair   • Depression, unspecified   • Long term (current) use of anticoagulants   • Long term (current) use of oral hypoglycemic drugs   • Wound of foot   • Non-prs chronic ulcer oth prt r foot limited to brkdwn skin   • Orthostatic hypotension   • Other chronic osteomyelitis, right ankle and foot   • Personal history of nicotine dependence   • Thrombocytopenia, unspecified   • Unspecified  open wound, right foot, initial encounter   • Diabetic foot infection   • Subacute osteomyelitis of right foot   • Right foot pain   • Sepsis   • Onychomycosis   • Foot pain, left     Past Medical History:   Diagnosis Date   • Absence of toe of right foot    • Acute osteomyelitis of left calcaneus  8/18/2021   • Anxiety and depression    • Arthritis    • Claustrophobia    • Corns and callus    • Diabetic ulcer of left heel associated with type 2 DM 8/18/2021   • Diabetic ulcer of left heel associated with type 2 DM 7/6/2021   • Diabetic ulcer of right midfoot associated with type 2 DM 8/18/2021   • Difficulty walking    • Essential hypertension 8/31/2021   • Hammertoe    • Hyperlipidemia LDL goal <100 8/31/2021   • Ingrown toenail    • Obesity    • Paroxysmal atrial fibrillation 8/31/2021   • Polyneuropathy    • Pressure ulcer, stage 1    • Tinea unguium    • Type 2 diabetes mellitus with polyneuropathy      Past Surgical History:   Procedure Laterality Date   • CYST REMOVAL      center of back; benign   • INCISION AND DRAINAGE ABSCESS      back   • INCISION AND DRAINAGE LEG Right 12/10/2021    Procedure: INCISION AND DRAINAGE LOWER EXTREMITY;  Surgeon: Ash Leyva DPM;  Location: [facility];  Service: Podiatry;  Laterality: Right;   • OTHER SURGICAL HISTORY      Surgical clips left foot   • TOE SURGERY Right     Removal of 5th toe   • TRANS METATARSAL AMPUTATION Right 12/2/2021    Procedure: AMPUTATION TRANS METATARSAL;  Surgeon: Ash Leyva DPM;  Location: [facility] OR;  Service: Podiatry;  Laterality: Right;   • WRIST SURGERY Left     repair of injury     PT Assessment (last 12 hours)     PT Evaluation and Treatment     Row Name 05/23/23 1020          Physical Therapy Time and Intention    Subjective Information complains of;weakness;fatigue;pain  -DK     Document Type therapy note (daily note)  -DK     Mode of Treatment individual therapy;physical therapy  -DK     Patient Effort  good  -DK     Symptoms Noted During/After Treatment fatigue;increased pain  -DK     Comment Pt is reporting greatly increased pain levels today.  -     Row Name 05/23/23 1020          Pain    Pretreatment Pain Rating 9/10  -DK     Posttreatment Pain Rating 9/10  -DK     Pain Location - Side/Orientation Bilateral  -DK     Pain Location generalized  -DK     Pain Location - hip;knee;foot  -DK     Pain Intervention(s) Distraction;Therapeutic presence  -     Row Name 05/23/23 1020          Cognition    Affect/Mental Status (Cognition) WFL  -DK     Orientation Status (Cognition) oriented x 4  -DK     Follows Commands (Cognition) WFL  -DK     Cognitive Function WFL  -DK     Personal Safety Interventions gait belt;nonskid shoes/slippers when out of bed;supervised activity  -     Row Name 05/23/23 1020          Motor Skills    Motor Skills --  therapeutic exercises  -DK     Coordination WFL  -DK     Therapeutic Exercise hip;knee;ankle  -DK     Additional Documentation --  Pt declined transfers this session due to increased pain levels.  -     Row Name 05/23/23 1020          Hip (Therapeutic Exercise)    Hip (Therapeutic Exercise) AAROM (active assistive range of motion)  -     Hip AAROM (Therapeutic Exercise) bilateral;flexion;extension;aBduction;aDduction;supine;10 repetitions;2 sets  -     Row Name 05/23/23 1020          Knee (Therapeutic Exercise)    Knee (Therapeutic Exercise) AAROM (active assistive range of motion)  -     Knee AAROM (Therapeutic Exercise) bilateral;flexion;extension;supine;10 repetitions;2 sets  -     Row Name 05/23/23 1020          Ankle (Therapeutic Exercise)    Ankle (Therapeutic Exercise) AROM (active range of motion)  -     Ankle AROM (Therapeutic Exercise) bilateral;dorsiflexion;plantarflexion;supine;20 repititions  -     Row Name             Wound 12/02/21 Right anterior foot Incision    Wound - Properties Group Placement Date: 12/02/21  -ES Side: Right  -ES Orientation:  anterior  -ES Location: foot  -ES Primary Wound Type: Incision  -ES    Retired Wound - Properties Group Placement Date: 12/02/21  -ES Side: Right  -ES Orientation: anterior  -ES Location: foot  -ES Primary Wound Type: Incision  -ES    Retired Wound - Properties Group Date first assessed: 12/02/21  -ES Side: Right  -ES Location: foot  -ES Primary Wound Type: Incision  -ES    Row Name             Wound 06/22/21 1133 Left lateral heel    Wound - Properties Group Placement Date: 06/22/21  -FABIOLA Placement Time: 1133  -FABIOLA Present on Hospital Admission: Y  -FABIOLA Side: Left  -FABIOLA Orientation: lateral  -FABIOLA Location: heel  -FABIOLA    Retired Wound - Properties Group Placement Date: 06/22/21  -FABIOLA Placement Time: 1133  -FABIOLA Present on Hospital Admission: Y  -FABIOLA Side: Left  -FABIOLA Orientation: lateral  -FABIOLA Location: heel  -FABIOLA    Retired Wound - Properties Group Date first assessed: 06/22/21  -FABIOLA Time first assessed: 1133  -FABIOLA Present on Hospital Admission: Y  -FABIOLA Side: Left  -FABIOLA Location: heel  -FABIOLA    Row Name             Wound 05/19/23 1317 gluteal Blisters    Wound - Properties Group Placement Date: 05/19/23  -RASHARD Placement Time: 1317  -RASHARD Present on Hospital Admission: N  -RASHARD Location: gluteal  -RASHARD Primary Wound Type: Blisters  -RASHARD    Retired Wound - Properties Group Placement Date: 05/19/23  -RASHARD Placement Time: 1317  -RASHARD Present on Hospital Admission: N  -RASHARD Location: gluteal  -RASHARD Primary Wound Type: Blisters  -RASHARD    Retired Wound - Properties Group Date first assessed: 05/19/23  -RASHARD Time first assessed: 1317  -RASHARD Present on Hospital Admission: N  -RASHARD Location: gluteal  -RASHARD Primary Wound Type: Blisters  -RASHARD    Row Name 05/23/23 1020          Plan of Care Review    Plan of Care Reviewed With patient  -DK     Progress no change  -DK     Row Name 05/23/23 1020          Positioning and Restraints    Pre-Treatment Position in bed  -DK     Post Treatment Position bed  -DK     In Bed supine;call light within reach;encouraged to call for  assist;exit alarm on;side rails up x2;legs elevated;heels elevated  -DK     Row Name 05/23/23 1020          Therapy Assessment/Plan (PT)    Rehab Potential (PT) fair, will monitor progress closely  -DK     Criteria for Skilled Interventions Met (PT) skilled treatment is necessary  -DK     Therapy Frequency (PT) daily  -DK     Problem List (PT) problems related to;balance;mobility;range of motion (ROM);strength;pain  -DK     Activity Limitations Related to Problem List (PT) unable to ambulate safely;unable to transfer safely  -DK     Row Name 05/23/23 1020          Progress Summary (PT)    Progress Toward Functional Goals (PT) progress toward functional goals is fair  -DK           User Key  (r) = Recorded By, (t) = Taken By, (c) = Cosigned By    Initials Name Provider Type    Karon Jordan, RN Registered Nurse    Mckenna Landaverde PTA Physical Therapist Assistant    Marlen Vaughn RN Registered Nurse    Katlyn Garcia RN Registered Nurse                  PT Recommendation and Plan  Planned Therapy Interventions (PT): balance training, bed mobility training, home exercise program, strengthening, transfer training  Therapy Frequency (PT): daily  Progress Summary (PT)  Progress Toward Functional Goals (PT): progress toward functional goals is fair  Plan of Care Reviewed With: patient  Progress: no change   Outcome Measures     Row Name 05/23/23 1019 05/22/23 0904          How much help from another person do you currently need...    Turning from your back to your side while in flat bed without using bedrails? 3  -DK 3  -DK     Moving from lying on back to sitting on the side of a flat bed without bedrails? 3  -DK 3  -DK     Moving to and from a bed to a chair (including a wheelchair)? 2  -DK 1  -DK     Standing up from a chair using your arms (e.g., wheelchair, bedside chair)? 2  -DK 1  -DK     Climbing 3-5 steps with a railing? 1  -DK 1  -DK     To walk in hospital room? 1  -DK 1  -DK     AM-PAC 6  Clicks Score (PT) 12  -DK 10  -DK        Functional Assessment    Outcome Measure Options AM-PAC 6 Clicks Basic Mobility (PT)  -DK AM-PAC 6 Clicks Basic Mobility (PT)  -DK           User Key  (r) = Recorded By, (t) = Taken By, (c) = Cosigned By    Initials Name Provider Type    Mckenna Landaverde PTA Physical Therapist Assistant                 Time Calculation:    PT Charges     Row Name 05/23/23 1022             Time Calculation    PT Received On 05/23/23  -DK      PT Goal Re-Cert Due Date 05/25/23  -DK         Timed Charges    47258 - PT Therapeutic Exercise Minutes 14  -DK      31686 - PT Therapeutic Activity Minutes 3  -DK         Total Minutes    Timed Charges Total Minutes 17  -DK       Total Minutes 17  -DK            User Key  (r) = Recorded By, (t) = Taken By, (c) = Cosigned By    Initials Name Provider Type    Mckenna Landaverde PTA Physical Therapist Assistant              Therapy Charges for Today     Code Description Service Date Service Provider Modifiers Qty    34632938529 HC PT THER PROC EA 15 MIN 5/22/2023 Mckenna Wong, CURTIS GP 1    90606955018 HC PT THERAPEUTIC ACT EA 15 MIN 5/22/2023 Mckenna Wong, CURTIS GP 1    46229503687 HC PT THER PROC EA 15 MIN 5/23/2023 Mckenna Wong, CURTIS GP 1          PT G-Codes  Outcome Measure Options: AM-PAC 6 Clicks Basic Mobility (PT)  AM-PAC 6 Clicks Score (PT): 12  AM-PAC 6 Clicks Score (OT): 14    Mckenna Wong PTA  5/23/2023

## 2023-05-23 NOTE — PLAN OF CARE
Goal Outcome Evaluation:  Plan of Care Reviewed With: patient        Progress: no change  Outcome Evaluation: Wound care completed per orders. Pt had c/o pain and muscle spasms, medicated per MAR. Pt resting comfortably with call light in reach, denies any needs currently.

## 2023-05-24 LAB
ALBUMIN SERPL-MCNC: 2 G/DL (ref 3.5–5.2)
ALBUMIN/GLOB SERPL: 0.4 G/DL
ALP SERPL-CCNC: 662 U/L (ref 39–117)
ALT SERPL W P-5'-P-CCNC: 38 U/L (ref 1–41)
ANION GAP SERPL CALCULATED.3IONS-SCNC: 6.5 MMOL/L (ref 5–15)
AST SERPL-CCNC: 63 U/L (ref 1–40)
BASOPHILS # BLD AUTO: 0.05 10*3/MM3 (ref 0–0.2)
BASOPHILS NFR BLD AUTO: 0.7 % (ref 0–1.5)
BILIRUB SERPL-MCNC: 0.6 MG/DL (ref 0–1.2)
BUN SERPL-MCNC: 10 MG/DL (ref 8–23)
BUN/CREAT SERPL: 20 (ref 7–25)
CALCIUM SPEC-SCNC: 8.4 MG/DL (ref 8.6–10.5)
CHLORIDE SERPL-SCNC: 101 MMOL/L (ref 98–107)
CO2 SERPL-SCNC: 27.5 MMOL/L (ref 22–29)
CREAT SERPL-MCNC: 0.5 MG/DL (ref 0.76–1.27)
DEPRECATED RDW RBC AUTO: 49.4 FL (ref 37–54)
EGFRCR SERPLBLD CKD-EPI 2021: 113.9 ML/MIN/1.73
EOSINOPHIL # BLD AUTO: 0.04 10*3/MM3 (ref 0–0.4)
EOSINOPHIL NFR BLD AUTO: 0.6 % (ref 0.3–6.2)
ERYTHROCYTE [DISTWIDTH] IN BLOOD BY AUTOMATED COUNT: 16.6 % (ref 12.3–15.4)
GLOBULIN UR ELPH-MCNC: 4.8 GM/DL
GLUCOSE BLDC GLUCOMTR-MCNC: 210 MG/DL (ref 70–99)
GLUCOSE BLDC GLUCOMTR-MCNC: 219 MG/DL (ref 70–99)
GLUCOSE BLDC GLUCOMTR-MCNC: 233 MG/DL (ref 70–99)
GLUCOSE BLDC GLUCOMTR-MCNC: 322 MG/DL (ref 70–99)
GLUCOSE SERPL-MCNC: 252 MG/DL (ref 65–99)
HCT VFR BLD AUTO: 38.5 % (ref 37.5–51)
HGB BLD-MCNC: 11.3 G/DL (ref 13–17.7)
IMM GRANULOCYTES # BLD AUTO: 0.02 10*3/MM3 (ref 0–0.05)
IMM GRANULOCYTES NFR BLD AUTO: 0.3 % (ref 0–0.5)
LYMPHOCYTES # BLD AUTO: 1.28 10*3/MM3 (ref 0.7–3.1)
LYMPHOCYTES NFR BLD AUTO: 18.8 % (ref 19.6–45.3)
MAGNESIUM SERPL-MCNC: 1.6 MG/DL (ref 1.6–2.4)
MCH RBC QN AUTO: 24.5 PG (ref 26.6–33)
MCHC RBC AUTO-ENTMCNC: 29.4 G/DL (ref 31.5–35.7)
MCV RBC AUTO: 83.3 FL (ref 79–97)
MONOCYTES # BLD AUTO: 0.54 10*3/MM3 (ref 0.1–0.9)
MONOCYTES NFR BLD AUTO: 7.9 % (ref 5–12)
NEUTROPHILS NFR BLD AUTO: 4.87 10*3/MM3 (ref 1.7–7)
NEUTROPHILS NFR BLD AUTO: 71.7 % (ref 42.7–76)
NRBC BLD AUTO-RTO: 0 /100 WBC (ref 0–0.2)
PHOSPHATE SERPL-MCNC: 3.5 MG/DL (ref 2.5–4.5)
PLATELET # BLD AUTO: 216 10*3/MM3 (ref 140–450)
PMV BLD AUTO: 10 FL (ref 6–12)
POTASSIUM SERPL-SCNC: 4.5 MMOL/L (ref 3.5–5.2)
PROT SERPL-MCNC: 6.8 G/DL (ref 6–8.5)
RBC # BLD AUTO: 4.62 10*6/MM3 (ref 4.14–5.8)
SODIUM SERPL-SCNC: 135 MMOL/L (ref 136–145)
WBC NRBC COR # BLD: 6.8 10*3/MM3 (ref 3.4–10.8)

## 2023-05-24 PROCEDURE — 84100 ASSAY OF PHOSPHORUS: CPT | Performed by: INTERNAL MEDICINE

## 2023-05-24 PROCEDURE — 63710000001 INSULIN DETEMIR PER 5 UNITS: Performed by: INTERNAL MEDICINE

## 2023-05-24 PROCEDURE — 63710000001 INSULIN LISPRO (HUMAN) PER 5 UNITS

## 2023-05-24 PROCEDURE — 83735 ASSAY OF MAGNESIUM: CPT | Performed by: INTERNAL MEDICINE

## 2023-05-24 PROCEDURE — 99232 SBSQ HOSP IP/OBS MODERATE 35: CPT | Performed by: INTERNAL MEDICINE

## 2023-05-24 PROCEDURE — 82948 REAGENT STRIP/BLOOD GLUCOSE: CPT

## 2023-05-24 PROCEDURE — 85025 COMPLETE CBC W/AUTO DIFF WBC: CPT | Performed by: INTERNAL MEDICINE

## 2023-05-24 PROCEDURE — 94799 UNLISTED PULMONARY SVC/PX: CPT

## 2023-05-24 PROCEDURE — 97110 THERAPEUTIC EXERCISES: CPT

## 2023-05-24 PROCEDURE — 80053 COMPREHEN METABOLIC PANEL: CPT | Performed by: INTERNAL MEDICINE

## 2023-05-24 RX ADMIN — MIDODRINE HYDROCHLORIDE 5 MG: 5 TABLET ORAL at 17:37

## 2023-05-24 RX ADMIN — DIGOXIN 125 MCG: 125 TABLET ORAL at 12:40

## 2023-05-24 RX ADMIN — INSULIN LISPRO 3 UNITS: 100 INJECTION, SOLUTION INTRAVENOUS; SUBCUTANEOUS at 10:10

## 2023-05-24 RX ADMIN — METOPROLOL SUCCINATE 50 MG: 50 TABLET, EXTENDED RELEASE ORAL at 10:11

## 2023-05-24 RX ADMIN — PREGABALIN 25 MG: 25 CAPSULE ORAL at 05:12

## 2023-05-24 RX ADMIN — APIXABAN 5 MG: 5 TABLET, FILM COATED ORAL at 21:15

## 2023-05-24 RX ADMIN — INSULIN DETEMIR 25 UNITS: 100 INJECTION, SOLUTION SUBCUTANEOUS at 21:18

## 2023-05-24 RX ADMIN — OXYCODONE HYDROCHLORIDE 10 MG: 5 TABLET ORAL at 17:37

## 2023-05-24 RX ADMIN — ACETAMINOPHEN 1000 MG: 325 TABLET ORAL at 15:09

## 2023-05-24 RX ADMIN — MIDODRINE HYDROCHLORIDE 5 MG: 5 TABLET ORAL at 10:11

## 2023-05-24 RX ADMIN — APIXABAN 5 MG: 5 TABLET, FILM COATED ORAL at 10:11

## 2023-05-24 RX ADMIN — AMOXICILLIN 1000 MG: 500 CAPSULE ORAL at 21:15

## 2023-05-24 RX ADMIN — AMOXICILLIN 1000 MG: 500 CAPSULE ORAL at 15:09

## 2023-05-24 RX ADMIN — ACETAMINOPHEN 1000 MG: 325 TABLET ORAL at 21:15

## 2023-05-24 RX ADMIN — INSULIN LISPRO 3 UNITS: 100 INJECTION, SOLUTION INTRAVENOUS; SUBCUTANEOUS at 17:37

## 2023-05-24 RX ADMIN — INSULIN DETEMIR 25 UNITS: 100 INJECTION, SOLUTION SUBCUTANEOUS at 10:10

## 2023-05-24 RX ADMIN — DILTIAZEM HYDROCHLORIDE 120 MG: 120 CAPSULE, COATED, EXTENDED RELEASE ORAL at 10:11

## 2023-05-24 RX ADMIN — ACETAMINOPHEN 1000 MG: 325 TABLET ORAL at 05:13

## 2023-05-24 RX ADMIN — PREGABALIN 25 MG: 25 CAPSULE ORAL at 21:15

## 2023-05-24 RX ADMIN — OXYCODONE HYDROCHLORIDE 10 MG: 5 TABLET ORAL at 04:06

## 2023-05-24 RX ADMIN — AMOXICILLIN 1000 MG: 500 CAPSULE ORAL at 05:12

## 2023-05-24 RX ADMIN — FAMOTIDINE 40 MG: 20 TABLET ORAL at 10:11

## 2023-05-24 RX ADMIN — OXYCODONE HYDROCHLORIDE 10 MG: 5 TABLET ORAL at 10:10

## 2023-05-24 RX ADMIN — ATORVASTATIN CALCIUM 10 MG: 10 TABLET, FILM COATED ORAL at 21:15

## 2023-05-24 RX ADMIN — INSULIN LISPRO 5 UNITS: 100 INJECTION, SOLUTION INTRAVENOUS; SUBCUTANEOUS at 21:15

## 2023-05-24 RX ADMIN — INSULIN LISPRO 3 UNITS: 100 INJECTION, SOLUTION INTRAVENOUS; SUBCUTANEOUS at 12:46

## 2023-05-24 RX ADMIN — PREGABALIN 25 MG: 25 CAPSULE ORAL at 15:09

## 2023-05-24 NOTE — THERAPY RE-EVALUATION
Patient Name: Jose Shaikh  : 1960    MRN: [id number]                              Today's Date: 2023       Admit Date: 2023    Visit Dx:     ICD-10-CM ICD-9-CM   1. Acute febrile illness  R50.9 780.60   2. Sepsis without acute organ dysfunction, due to unspecified organism  A41.9 038.9     995.91   3. Pneumonia of right upper lobe due to infectious organism  J18.9 486   4. Uncontrolled type 2 diabetes mellitus with hyperglycemia  E11.65 250.02   5. Atrial fibrillation with rapid ventricular response  I48.91 427.31   6. Acute osteomyelitis of right foot  M86.171 730.07   7. Decreased activities of daily living (ADL)  Z78.9 V49.89   8. Difficulty in walking  R26.2 719.7     Patient Active Problem List   Diagnosis   • Diabetic ulcer of left heel associated with type 2 DM   • Acute osteomyelitis of left calcaneus    • Diabetic ulcer of left heel associated with type 2 DM   • Diabetic ulcer of right midfoot associated with type 2 DM   • Paroxysmal atrial fibrillation   • Essential hypertension   • Hyperlipidemia LDL goal <100   • Cellulitis and abscess of foot   • High alkaline phosphatase level   • Osteomyelitis   • Onychomycosis   • Onychocryptosis   • Foot pain, bilateral   • Osteomyelitis of foot, right, acute   • Cellulitis of right foot   • Type 2 diabetes mellitus, with long-term current use of insulin   • Class 3 severe obesity due to excess calories with serious comorbidity and body mass index (BMI) of 45.0 to 49.9 in adult   • Anxiety disorder, unspecified   • Claustrophobia   • Dependence on wheelchair   • Depression, unspecified   • Long term (current) use of anticoagulants   • Long term (current) use of oral hypoglycemic drugs   • Wound of foot   • Non-prs chronic ulcer oth prt r foot limited to brkdwn skin   • Orthostatic hypotension   • Other chronic osteomyelitis, right ankle and foot   • Personal history of nicotine dependence   • Thrombocytopenia, unspecified   • Unspecified open  wound, right foot, initial encounter   • Diabetic foot infection   • Subacute osteomyelitis of right foot   • Right foot pain   • Sepsis   • Onychomycosis   • Foot pain, left     Past Medical History:   Diagnosis Date   • Absence of toe of right foot    • Acute osteomyelitis of left calcaneus  8/18/2021   • Anxiety and depression    • Arthritis    • Claustrophobia    • Corns and callus    • Diabetic ulcer of left heel associated with type 2 DM 8/18/2021   • Diabetic ulcer of left heel associated with type 2 DM 7/6/2021   • Diabetic ulcer of right midfoot associated with type 2 DM 8/18/2021   • Difficulty walking    • Essential hypertension 8/31/2021   • Hammertoe    • Hyperlipidemia LDL goal <100 8/31/2021   • Ingrown toenail    • Obesity    • Paroxysmal atrial fibrillation 8/31/2021   • Polyneuropathy    • Pressure ulcer, stage 1    • Tinea unguium    • Type 2 diabetes mellitus with polyneuropathy      Past Surgical History:   Procedure Laterality Date   • CYST REMOVAL      center of back; benign   • INCISION AND DRAINAGE ABSCESS      back   • INCISION AND DRAINAGE LEG Right 12/10/2021    Procedure: INCISION AND DRAINAGE LOWER EXTREMITY;  Surgeon: Ash Leyva DPM;  Location: [facility] OR;  Service: Podiatry;  Laterality: Right;   • OTHER SURGICAL HISTORY      Surgical clips left foot   • TOE SURGERY Right     Removal of 5th toe   • TRANS METATARSAL AMPUTATION Right 12/2/2021    Procedure: AMPUTATION TRANS METATARSAL;  Surgeon: Ash Leyva DPM;  Location: [facility] OR;  Service: Podiatry;  Laterality: Right;   • WRIST SURGERY Left     repair of injury      General Information     Row Name 05/24/23 1438 05/24/23 1420       OT Time and Intention    Document Type therapy note (daily note)  -LF re-evaluation  -LF    Mode of Treatment individual therapy;occupational therapy  -LF individual therapy;occupational therapy  -LF    Row Name 05/24/23 1420          General Information    Existing  Precautions/Restrictions fall  -[facility] Name 05/24/23 1420          Cognition    Orientation Status (Cognition) oriented x 4  -[facility] Name 05/24/23 1420          Safety Issues, Functional Mobility    Impairments Affecting Function (Mobility) balance;endurance/activity tolerance;pain;range of motion (ROM);strength  -           User Key  (r) = Recorded By, (t) = Taken By, (c) = Cosigned By    Initials Name Provider Type     Michelle Howard OT Occupational Therapist                 Mobility/ADL's     [facility] Name 05/24/23 1439          Bed Mobility    Comment, (Bed Mobility) Declined transfers this session. Will continue to assess.  -[facility] Name 05/24/23 1439          Activities of Daily Living    BADL Assessment/Intervention bathing;upper body dressing;lower body dressing;grooming;feeding;toileting  -[facility] Name 05/24/23 1439          Mobility    Extremity Weight-bearing Status left lower extremity;right lower extremity  -     Left Lower Extremity (Weight-bearing Status) non weight-bearing (NWB)  Pt can be R heel/L forefoot WB as needed.  -     Right Lower Extremity (Weight-bearing Status) non weight-bearing (NWB)  Pt can be R heel/L forefoot WB as needed.  -[facility] Name 05/24/23 1439          Bathing Assessment/Intervention    [place] Level (Bathing) bathing skills;maximum assist (25% patient effort)  -LF     Row Name 05/24/23 1439          Upper Body Dressing Assessment/Training    [place] Level (Upper Body Dressing) upper body dressing skills;minimum assist (75% patient effort)  -LF     Row Name 05/24/23 1439          Lower Body Dressing Assessment/Training    [place] Level (Lower Body Dressing) lower body dressing skills;dependent (less than 25% patient effort)  -LF     Row Name 05/24/23 1439          Grooming Assessment/Training    [place] Level (Grooming) grooming skills;set up  -LF     Row Name 05/24/23 1439          Toileting Assessment/Training    [place] Level  (Toileting) toileting skills;dependent (less than 25% patient effort)  -     Row Name 05/24/23 1439          Self-Feeding Assessment/Training    [place] Level (Feeding) feeding skills;set up  -           User Key  (r) = Recorded By, (t) = Taken By, (c) = Cosigned By    Initials Name Provider Type     Michelle Howard OT Occupational Therapist               Obj/Interventions     Row Name 05/24/23 1440          Sensory Assessment (Somatosensory)    Sensory Assessment (Somatosensory) UE sensation intact  -[facility] Name 05/24/23 1440          Vision Assessment/Intervention    Visual Impairment/Limitations WFL  -     Row Name 05/24/23 1440          Range of Motion Comprehensive    General Range of Motion no range of motion deficits identified  -[facility] Name 05/24/23 1440          Strength Comprehensive (MMT)    Comment, General Manual Muscle Testing (MMT) Assessment 4+/5 LUE and 3+/5 RUE  -[facility] Name 05/24/23 1420          Shoulder (Therapeutic Exercise)    Shoulder (Therapeutic Exercise) strengthening exercise  -     Shoulder Strengthening (Therapeutic Exercise) bilateral;resistance band;yellow;20 repititions;2 sets  Shoulder flexion/extension, forward reach, and horizontal abduction/adduction 20 reps x 2 sets. Unable to perform overhead shoulder flexion/extension using RUE due to prior impairment.  -     Row Name 05/24/23 1420          Elbow/Forearm (Therapeutic Exercise)    Elbow/Forearm (Therapeutic Exercise) strengthening exercise  -     Elbow/Forearm Strengthening (Therapeutic Exercise) bilateral;flexion;extension;resistance band;yellow;20 repititions;2 sets  -[facility] Name 05/24/23 1440 05/24/23 1420       Motor Skills    Motor Skills coordination;functional endurance  -LF functional endurance  -LF    Coordination WFL  -LF --    Functional Endurance Fair  -LF Fair  -LF    Therapeutic Exercise -- shoulder;elbow/forearm  -    Row Name 05/24/23 1440          Balance    Comment,  Balance Not assessed, likely impaired.  -LF           User Key  (r) = Recorded By, (t) = Taken By, (c) = Cosigned By    Initials Name Provider Type    LF Michelle Howard OT Occupational Therapist               Goals/Plan     Row Name 05/24/23 1430          Bed Mobility Goal 1 (OT)    Activity/Assistive Device (Bed Mobility Goal 1, OT) bed mobility activities, all  -LF     [place] Level/Cues Needed (Bed Mobility Goal 1, OT) modified independence  -LF     Time Frame (Bed Mobility Goal 1, OT) long term goal (LTG);10 days  -LF     Progress/Outcomes (Bed Mobility Goal 1, OT) new goal  -LF     Row Name 05/24/23 1430          Transfer Goal 1 (OT)    Activity/Assistive Device (Transfer Goal 1, OT) transfers, all  -LF     [place] Level/Cues Needed (Transfer Goal 1, OT) modified independence  -LF     Time Frame (Transfer Goal 1, OT) long term goal (LTG);10 days  -LF     Progress/Outcome (Transfer Goal 1, OT) continuing progress toward goal  -LF     Row Name 05/24/23 1430          Bathing Goal 1 (OT)    Activity/Device (Bathing Goal 1, OT) bathing skills, all  -LF     [place] Level/Cues Needed (Bathing Goal 1, OT) modified independence  -LF     Time Frame (Bathing Goal 1, OT) long term goal (LTG);10 days  -LF     Progress/Outcomes (Bathing Goal 1, OT) continuing progress toward goal  -LF     Row Name 05/24/23 1430          Dressing Goal 1 (OT)    Activity/Device (Dressing Goal 1, OT) dressing skills, all  -LF     [place]/Cues Needed (Dressing Goal 1, OT) modified independence  -LF     Time Frame (Dressing Goal 1, OT) long term goal (LTG);10 days  -LF     Progress/Outcome (Dressing Goal 1, OT) continuing progress toward goal  -LF     Row Name 05/24/23 1430          Toileting Goal 1 (OT)    Activity/Device (Toileting Goal 1, OT) toileting skills, all  -LF     [place] Level/Cues Needed (Toileting Goal 1, OT) modified independence  -LF     Time Frame (Toileting Goal 1, OT) long term goal (LTG);10 days   -LF     Progress/Outcome (Toileting Goal 1, OT) continuing progress toward goal  -LF     Row Name 05/24/23 1430          Grooming Goal 1 (OT)    Activity/Device (Grooming Goal 1, OT) grooming skills, all  -LF     [place] (Grooming Goal 1, OT) modified independence  -LF     Time Frame (Grooming Goal 1, OT) long term goal (LTG);10 days  -LF     Progress/Outcome (Grooming Goal 1, OT) continuing progress toward goal  -LF     Row Name 05/24/23 1430          Strength Goal 1 (OT)    Strength Goal 1 (OT) Patient will improve right shoulder strength to 4/5 to support independence with self-care activities and functional transfers  -LF     Time Frame (Strength Goal 1, OT) long term goal (LTG);10 days  -LF     Progress/Outcome (Strength Goal 1, OT) continuing progress toward goal  -LF     Row Name 05/24/23 1430          Problem Specific Goal 1 (OT)    Problem Specific Goal 1 (OT) Patient will improve activity tolerance to fair plus to support independence with ADL activities  -LF     Time Frame (Problem Specific Goal 1, OT) long term goal (LTG);10 days  -LF     Progress/Outcome (Problem Specific Goal 1, OT) continuing progress toward goal  -LF     Row Name 05/24/23 1430          Therapy Assessment/Plan (OT)    Planned Therapy Interventions (OT) activity tolerance training;patient/caregiver education/training;BADL retraining;functional balance retraining;occupation/activity based interventions;strengthening exercise;transfer/mobility retraining  -           User Key  (r) = Recorded By, (t) = Taken By, (c) = Cosigned By    Initials Name Provider Type     Michelle Howard OT Occupational Therapist               Clinical Impression     Row Name 05/24/23 1423          Pain Assessment    Additional Documentation Pain Scale: FACES Pre/Post-Treatment (Group)  -     Row Name 05/24/23 1423          Pain Scale: FACES Pre/Post-Treatment    Pain: FACES Scale, Pretreatment 0-->no hurt  -LF     Posttreatment Pain Rating 2-->hurts  little bit  -     Pain Location - Side/Orientation Right  -     Pain Location - shoulder  -LF     Row Name 05/24/23 1423          Plan of Care Review    Plan of Care Reviewed With patient  -     Progress improving  -     Outcome Evaluation Patient continues to present with limitations in self-care, functional transfers, balance, and endurance. He would benefit from continued skilled occupational therapy services to maximize independence with ADLs/functional transfers. Subacute rehab recommended upon discharge from hospital. Bed mobility goal added, otherwise continue with established plan of care and goals.  -     Row Name 05/24/23 1423          Therapy Assessment/Plan (OT)    Rehab Potential (OT) good, to achieve stated therapy goals  -     Criteria for Skilled Therapeutic Interventions Met (OT) yes;meets criteria;skilled treatment is necessary  -     Therapy Frequency (OT) 5 times/wk  -     Row Name 05/24/23 1423          Therapy Plan Review/Discharge Plan (OT)    Anticipated Discharge Disposition (OT) sub acute care setting  -     Row Name 05/24/23 1423          Vital Signs    O2 Delivery Pre Treatment room air  -LF     O2 Delivery Intra Treatment room air  -LF     O2 Delivery Post Treatment room air  -LF           User Key  (r) = Recorded By, (t) = Taken By, (c) = Cosigned By    Initials Name Provider Type    LF Michelle Howard, OT Occupational Therapist               Outcome Measures     Row Name 05/24/23 1425          How much help from another is currently needed...    Putting on and taking off regular lower body clothing? 1  -LF     Bathing (including washing, rinsing, and drying) 2  -LF     Toileting (which includes using toilet bed pan or urinal) 1  -LF     Putting on and taking off regular upper body clothing 3  -LF     Taking care of personal grooming (such as brushing teeth) 3  -LF     Eating meals 4  -LF     AM-PAC 6 Clicks Score (OT) 14  -     Row Name 05/24/23 1428           Functional Assessment    Outcome Measure Options AM-PAC 6 Clicks Daily Activity (OT);Optimal Instrument  -LF     Row Name 05/24/23 1425          Optimal Instrument    Optimal Instrument Optimal - 3  -LF     Bending/Stooping 5  -LF     Standing 5  -LF     Reaching 1  -LF     From the list, choose the 3 activities you would most like to be able to do without any difficulty Bending/stooping;Standing;Reaching  -LF     Total Score Optimal - 3 11  -LF           User Key  (r) = Recorded By, (t) = Taken By, (c) = Cosigned By    Initials Name Provider Type    Michelle Chaney OT Occupational Therapist                Occupational Therapy Education     Title: PT OT SLP Therapies (Done)     Topic: Occupational Therapy (Done)     Point: ADL training (Done)     Description:   Instruct learner(s) on proper safety adaptation and remediation techniques during self care or transfers.   Instruct in proper use of assistive devices.              Learning Progress Summary           Patient Acceptance, E,D, DU by PG at 5/15/2023 1303                   Point: Home exercise program (Done)     Description:   Instruct learner(s) on appropriate technique for monitoring, assisting and/or progressing therapeutic exercises/activities.              Learning Progress Summary           Patient Acceptance, E,D, DU by PG at 5/15/2023 1303                   Point: Precautions (Done)     Description:   Instruct learner(s) on prescribed precautions during self-care and functional transfers.              Learning Progress Summary           Patient Acceptance, E,D, DU by PG at 5/15/2023 1303                   Point: Body mechanics (Done)     Description:   Instruct learner(s) on proper positioning and spine alignment during self-care, functional mobility activities and/or exercises.              Learning Progress Summary           Patient Acceptance, E,D, DU by PG at 5/15/2023 1303                               User Key     Initials Effective Dates  Name Provider Type Discipline    PG 06/16/21 -  [place] OT Occupational Therapist OT              OT Recommendation and Plan  Planned Therapy Interventions (OT): activity tolerance training, patient/caregiver education/training, BADL retraining, functional balance retraining, occupation/activity based interventions, strengthening exercise, transfer/mobility retraining  Therapy Frequency (OT): 5 times/wk  Plan of Care Review  Plan of Care Reviewed With: patient  Progress: improving  Outcome Evaluation: Patient continues to present with limitations in self-care, functional transfers, balance, and endurance. He would benefit from continued skilled occupational therapy services to maximize independence with ADLs/functional transfers. Subacute rehab recommended upon discharge from hospital. Bed mobility goal added, otherwise continue with established plan of care and goals.     Time Calculation:    Time Calculation- OT     Row Name 05/24/23 1428             Time Calculation- OT    OT Received On 05/24/23  -LF      OT Goal Re-Cert Due Date 06/02/23  -LF         Timed Charges    29390 - OT Therapeutic Exercise Minutes 10  -LF         Untimed Charges    OT Eval/Re-eval Minutes 10  -LF         Total Minutes    Timed Charges Total Minutes 10  -LF      Untimed Charges Total Minutes 10  -LF       Total Minutes 20  -LF            User Key  (r) = Recorded By, (t) = Taken By, (c) = Cosigned By    Initials Name Provider Type    LF Michelle Howard OT Occupational Therapist              Therapy Charges for Today     Code Description Service Date Service Provider Modifiers Qty    [id number] HC OT THER PROC EA 15 MIN 5/24/2023 Michelle Howard OT GO 1               Michelle Howard OT  5/24/2023

## 2023-05-24 NOTE — PROGRESS NOTES
[facility]   Hospitalist Progress Note  Date: 2023  Patient Name: Jose Shaikh  : 1960  MRN: [id number]  Date of admission: 2023      Subjective   Subjective     Chief Complaint: Left hip pain    Summary:   Jose Shaikh is a 62 y.o. male past medical history of osteomyelitis, type 2 diabetes, hypertension, A-fib, dyslipidemia, obesity with BMI of 48, who was recently hospital for osteomyelitis and he returns due to hip pain.      Patient was recently admitted to the hospital in early April for foot infection.  There was recommendation for amputation due to osteomyelitis in the foot and the patient refused.  He was unable to be placed at that time.  He is a sex offender making it very difficult for placement although [facility] was an option.  As result, he opted to go home on oral antibiotics to treat Alcaligenes facialis and Morganella morganii with doxycycline and ciprofloxacin.  The patient was seen in wound care in middle of April and at that time Dr. Daniels attempted to switch antibiotics to Levaquin per pharmacy recommendations based off culture data but was unable to get hold the patient so he was never switched.  The patient states the only reason he came to the emergency department was because his left hip was hurting.  Unaware of fevers.  Chills.  Cough.  Shortness of breath.  The patient came to the ER for hip pain.    Patient blood culture grew Streptococcus agalactiae.  Zyvox DC'd.  Patient does not want any amputation.  Patient wound culture from right foot also grew Streptococcus.  X-ray of pelvis and hip shows DJD of bilateral hips left worse than right.  Patient refused MRI of the left hip and order was canceled as patient is very claustrophobic.  Per ID recommendation needs 6 weeks of oral amoxicillin for osteomyelitis of right foot.  Patient is doing well, he is weak and debilitated however and Social work is arranging placement in a rehab facility for  patient    Interval Followup: No acute events overnight, patient resting comfortably in bed    Objective   Objective     Vitals:   Temp:  [97.7 °F (36.5 °C)-98.2 °F (36.8 °C)] 97.7 °F (36.5 °C)  Heart Rate:  [65-70] 65  Resp:  [18] 18  BP: (106-127)/(50-63) 108/53  Physical Exam      GEN: No acute distress, resting comfortably  HEENT: Moist mucous membranes  LUNGS: Equal chest rise bilaterally  CARDIAC: Regular rate and rhythm  NEURO: Moving all 4 extremities spontaneously     Result Review    Result Review:  I have personally reviewed the results for the past 24 hours and agree with these findings:  [x]  Laboratory  [x]  Microbiology  [x]  Radiology  [x]  EKG/Telemetry   []  Cardiology/Vascular   []  Pathology  [x]  Old records  [x]  Other: Medications    Assessment & Plan   Assessment / Plan     Assessment:  Sepsis present on admission.  Patient presented with fever, tachycardia, lactic acidosis, elevated procalcitonin and leukocytosis.  Resolved  Right upper lobe healthcare associated pneumonia .  Streptococcus agalactiae bacteremia.  Subsequent cultures negative  Chronic osteomyelitis of right foot involving cuboid and may be cuneiform.  On p.o. amoxicillin for 6 weeks  Cellulitis of right foot due to Streptococcus agalactiae.  Paroxysmal A-fib with intermittent RVR on Eliquis.  Bilateral diabetic foot ulcers.  NIDDM.  Most recent A1c 6.6 in April 2023  Morbid obesity BMI of 48.1.  S/p TMA of right foot.  Hyponatremia.  likely from hyperglycemia/volume overload.  Clinically significant.  Improved  Hypophosphatemia.  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  Chronic shoulder neck pain    Plan:  Patient refused MRI of left hip.  X-ray showed arthritis  Continue to fluid restriction for hyponatremia.  This is improved  Finished 6 days of Rocephin, continue 6 weeks of oral Amoxil for osteomyelitis  Right foot is likely source of bacteremia as per ID.  Wound culture and blood culture noted.  Repeat  blood cultures final results with no growth.  Appreciate podiatry input.  Per podiatry right foot wound not source of infection?  Patient does not want below-knee amputation, will start treatment with antibiotics for now  Continue midodrine, monitor blood pressure  Continue p.o. digoxin  Continue long-acting p.o. Cardizem.    Continue home metoprolol at increased dose, titrate to effect  Continue IV and oral narcotics for pain control.  Continue on Lyrica.  Continue basal bolus insulin, titrate to effect  Heating pad to neck and shoulder pain areas, pain improving  Continue Flexeril for muscle spasm, monitor for sedation  Continue wound care recommendations.    Continue PT/OT    Reviewed patients labs and imaging, and discussed with patient and nurse at bedside.    DVT prophylaxis:  Medical DVT prophylaxis orders are present.  Home Eliquis    CODE STATUS:   Level Of Support Discussed With: Patient  Code Status (Patient has no pulse and is not breathing): CPR (Attempt to Resuscitate)  Medical Interventions (Patient has pulse or is breathing): Full Support    Electronically signed by Max Mehta MD, 05/24/23, 10:18 AM EDT.

## 2023-05-25 LAB
ALBUMIN SERPL-MCNC: 2.1 G/DL (ref 3.5–5.2)
ALBUMIN/GLOB SERPL: 0.5 G/DL
ALP SERPL-CCNC: 565 U/L (ref 39–117)
ALT SERPL W P-5'-P-CCNC: 35 U/L (ref 1–41)
ANION GAP SERPL CALCULATED.3IONS-SCNC: 5.6 MMOL/L (ref 5–15)
AST SERPL-CCNC: 52 U/L (ref 1–40)
BASOPHILS # BLD AUTO: 0.05 10*3/MM3 (ref 0–0.2)
BASOPHILS NFR BLD AUTO: 0.8 % (ref 0–1.5)
BILIRUB SERPL-MCNC: 0.7 MG/DL (ref 0–1.2)
BUN SERPL-MCNC: 10 MG/DL (ref 8–23)
BUN/CREAT SERPL: 25.6 (ref 7–25)
CALCIUM SPEC-SCNC: 8.4 MG/DL (ref 8.6–10.5)
CHLORIDE SERPL-SCNC: 101 MMOL/L (ref 98–107)
CO2 SERPL-SCNC: 28.4 MMOL/L (ref 22–29)
CREAT SERPL-MCNC: 0.39 MG/DL (ref 0.76–1.27)
DEPRECATED RDW RBC AUTO: 47.8 FL (ref 37–54)
EGFRCR SERPLBLD CKD-EPI 2021: 122.8 ML/MIN/1.73
EOSINOPHIL # BLD AUTO: 0.07 10*3/MM3 (ref 0–0.4)
EOSINOPHIL NFR BLD AUTO: 1.1 % (ref 0.3–6.2)
ERYTHROCYTE [DISTWIDTH] IN BLOOD BY AUTOMATED COUNT: 16.3 % (ref 12.3–15.4)
GLOBULIN UR ELPH-MCNC: 4.5 GM/DL
GLUCOSE BLDC GLUCOMTR-MCNC: 219 MG/DL (ref 70–99)
GLUCOSE BLDC GLUCOMTR-MCNC: 241 MG/DL (ref 70–99)
GLUCOSE BLDC GLUCOMTR-MCNC: 251 MG/DL (ref 70–99)
GLUCOSE BLDC GLUCOMTR-MCNC: 282 MG/DL (ref 70–99)
GLUCOSE SERPL-MCNC: 179 MG/DL (ref 65–99)
HCT VFR BLD AUTO: 35.8 % (ref 37.5–51)
HGB BLD-MCNC: 11 G/DL (ref 13–17.7)
IMM GRANULOCYTES # BLD AUTO: 0.03 10*3/MM3 (ref 0–0.05)
IMM GRANULOCYTES NFR BLD AUTO: 0.5 % (ref 0–0.5)
LYMPHOCYTES # BLD AUTO: 1.11 10*3/MM3 (ref 0.7–3.1)
LYMPHOCYTES NFR BLD AUTO: 17.5 % (ref 19.6–45.3)
MAGNESIUM SERPL-MCNC: 1.7 MG/DL (ref 1.6–2.4)
MCH RBC QN AUTO: 24.9 PG (ref 26.6–33)
MCHC RBC AUTO-ENTMCNC: 30.7 G/DL (ref 31.5–35.7)
MCV RBC AUTO: 81.2 FL (ref 79–97)
MONOCYTES # BLD AUTO: 0.66 10*3/MM3 (ref 0.1–0.9)
MONOCYTES NFR BLD AUTO: 10.4 % (ref 5–12)
NEUTROPHILS NFR BLD AUTO: 4.42 10*3/MM3 (ref 1.7–7)
NEUTROPHILS NFR BLD AUTO: 69.7 % (ref 42.7–76)
NRBC BLD AUTO-RTO: 0 /100 WBC (ref 0–0.2)
PHOSPHATE SERPL-MCNC: 3.3 MG/DL (ref 2.5–4.5)
PLATELET # BLD AUTO: 198 10*3/MM3 (ref 140–450)
PMV BLD AUTO: 10.8 FL (ref 6–12)
POTASSIUM SERPL-SCNC: 4 MMOL/L (ref 3.5–5.2)
PROT SERPL-MCNC: 6.6 G/DL (ref 6–8.5)
RBC # BLD AUTO: 4.41 10*6/MM3 (ref 4.14–5.8)
SODIUM SERPL-SCNC: 135 MMOL/L (ref 136–145)
WBC NRBC COR # BLD: 6.34 10*3/MM3 (ref 3.4–10.8)

## 2023-05-25 PROCEDURE — 94799 UNLISTED PULMONARY SVC/PX: CPT

## 2023-05-25 PROCEDURE — 83735 ASSAY OF MAGNESIUM: CPT | Performed by: INTERNAL MEDICINE

## 2023-05-25 PROCEDURE — 63710000001 INSULIN LISPRO (HUMAN) PER 5 UNITS

## 2023-05-25 PROCEDURE — 97110 THERAPEUTIC EXERCISES: CPT

## 2023-05-25 PROCEDURE — 63710000001 INSULIN DETEMIR PER 5 UNITS: Performed by: INTERNAL MEDICINE

## 2023-05-25 PROCEDURE — 85025 COMPLETE CBC W/AUTO DIFF WBC: CPT | Performed by: INTERNAL MEDICINE

## 2023-05-25 PROCEDURE — 99232 SBSQ HOSP IP/OBS MODERATE 35: CPT | Performed by: INTERNAL MEDICINE

## 2023-05-25 PROCEDURE — 84100 ASSAY OF PHOSPHORUS: CPT | Performed by: INTERNAL MEDICINE

## 2023-05-25 PROCEDURE — 82948 REAGENT STRIP/BLOOD GLUCOSE: CPT

## 2023-05-25 PROCEDURE — 80053 COMPREHEN METABOLIC PANEL: CPT | Performed by: INTERNAL MEDICINE

## 2023-05-25 PROCEDURE — 97164 PT RE-EVAL EST PLAN CARE: CPT

## 2023-05-25 RX ADMIN — METOPROLOL SUCCINATE 50 MG: 50 TABLET, EXTENDED RELEASE ORAL at 08:42

## 2023-05-25 RX ADMIN — DOCUSATE SODIUM 50MG AND SENNOSIDES 8.6MG 2 TABLET: 8.6; 5 TABLET, FILM COATED ORAL at 08:41

## 2023-05-25 RX ADMIN — PREGABALIN 25 MG: 25 CAPSULE ORAL at 21:22

## 2023-05-25 RX ADMIN — INSULIN DETEMIR 25 UNITS: 100 INJECTION, SOLUTION SUBCUTANEOUS at 08:42

## 2023-05-25 RX ADMIN — AMOXICILLIN 1000 MG: 500 CAPSULE ORAL at 21:22

## 2023-05-25 RX ADMIN — OXYCODONE HYDROCHLORIDE 10 MG: 5 TABLET ORAL at 12:23

## 2023-05-25 RX ADMIN — CYCLOBENZAPRINE 10 MG: 10 TABLET, FILM COATED ORAL at 06:32

## 2023-05-25 RX ADMIN — DIGOXIN 125 MCG: 125 TABLET ORAL at 12:23

## 2023-05-25 RX ADMIN — OXYCODONE HYDROCHLORIDE 10 MG: 5 TABLET ORAL at 21:22

## 2023-05-25 RX ADMIN — ACETAMINOPHEN 1000 MG: 325 TABLET ORAL at 05:01

## 2023-05-25 RX ADMIN — DILTIAZEM HYDROCHLORIDE 120 MG: 120 CAPSULE, COATED, EXTENDED RELEASE ORAL at 08:42

## 2023-05-25 RX ADMIN — MIDODRINE HYDROCHLORIDE 5 MG: 5 TABLET ORAL at 06:32

## 2023-05-25 RX ADMIN — INSULIN LISPRO 4 UNITS: 100 INJECTION, SOLUTION INTRAVENOUS; SUBCUTANEOUS at 21:22

## 2023-05-25 RX ADMIN — AMOXICILLIN 1000 MG: 500 CAPSULE ORAL at 13:52

## 2023-05-25 RX ADMIN — INSULIN LISPRO 3 UNITS: 100 INJECTION, SOLUTION INTRAVENOUS; SUBCUTANEOUS at 12:23

## 2023-05-25 RX ADMIN — ACETAMINOPHEN 1000 MG: 325 TABLET ORAL at 13:52

## 2023-05-25 RX ADMIN — INSULIN DETEMIR 27 UNITS: 100 INJECTION, SOLUTION SUBCUTANEOUS at 21:22

## 2023-05-25 RX ADMIN — PREGABALIN 25 MG: 25 CAPSULE ORAL at 05:01

## 2023-05-25 RX ADMIN — MIDODRINE HYDROCHLORIDE 5 MG: 5 TABLET ORAL at 12:23

## 2023-05-25 RX ADMIN — OXYCODONE HYDROCHLORIDE 10 MG: 5 TABLET ORAL at 03:51

## 2023-05-25 RX ADMIN — AMOXICILLIN 1000 MG: 500 CAPSULE ORAL at 05:01

## 2023-05-25 RX ADMIN — APIXABAN 5 MG: 5 TABLET, FILM COATED ORAL at 08:42

## 2023-05-25 RX ADMIN — FAMOTIDINE 40 MG: 20 TABLET ORAL at 08:41

## 2023-05-25 RX ADMIN — ATORVASTATIN CALCIUM 10 MG: 10 TABLET, FILM COATED ORAL at 21:22

## 2023-05-25 RX ADMIN — INSULIN LISPRO 4 UNITS: 100 INJECTION, SOLUTION INTRAVENOUS; SUBCUTANEOUS at 08:42

## 2023-05-25 RX ADMIN — ACETAMINOPHEN 1000 MG: 325 TABLET ORAL at 21:22

## 2023-05-25 RX ADMIN — APIXABAN 5 MG: 5 TABLET, FILM COATED ORAL at 21:22

## 2023-05-25 RX ADMIN — MIDODRINE HYDROCHLORIDE 5 MG: 5 TABLET ORAL at 17:40

## 2023-05-25 RX ADMIN — PREGABALIN 25 MG: 25 CAPSULE ORAL at 13:52

## 2023-05-25 RX ADMIN — INSULIN LISPRO 3 UNITS: 100 INJECTION, SOLUTION INTRAVENOUS; SUBCUTANEOUS at 17:40

## 2023-05-25 NOTE — DISCHARGE PLACEMENT REQUEST
"Sami Shaikh (62 y.o. Male)     Date of Birth   1960    Social Security Number       Address   [address] [place] [address]    Home Phone   [phone]    MRN   [id number]       Yazdanism   None    Marital Status                               Admission Date   5/12/23    Admission Type   Emergency    Admitting Provider   Shekhar Au MD    Attending Provider   Shekhar Au MD    Department, Room/Bed   [address], 4021/1       Discharge Date       Discharge Disposition       Discharge Destination                               Attending Provider: Shekhar Au MD    Allergies: Adhesive Tape    Isolation: None   Infection: None   Code Status: CPR    Ht: 188 cm (74\")   Wt: 168 kg (370 lb 3.2 oz)    Admission Cmt: None   Principal Problem: Sepsis [A41.9]                 Active Insurance as of 5/12/2023     Primary Coverage     Payor Plan Insurance Group Employer/Plan Group    [employer] MEDICARE REPLACEMENT [facility] DUAL COMPLETE MEDICARE REPLACEMENT [id number]     Payor Plan Address Payor Plan Phone Number Payor Plan Fax Number Effective Dates    [address] [phone]  5/1/2022 - None Entered    [facility] [address]       Subscriber Name Subscriber Birth Date Member ID       SAMI SHAIKH 1960 [id number]           Secondary Coverage     Payor Plan Insurance Group Employer/Plan Group    [place]      Payor Plan Address Payor Plan Phone Number Payor Plan Fax Number Effective Dates    [address] [phone]  6/22/2021 - None Entered    [facility] [id number]       Subscriber Name Subscriber Birth Date Member ID       SAMI SHAIKH 1960 [id number]                 Emergency Contacts      (Rel.) Home Phone Work Phone Mobile Phone    Jermaine Huff (Son) -- -- [phone]    HuffRadha jiménez (Daughter) -- -- [phone]            Emergency Contact Information     Name Relation Home Work Mobile "    Jermaine Huff Son   [phone]    Radha Huff Daughter   [phone]          Insurance Information                [facility] MEDICARE REPLACEMENT/[facility] DUAL COMPLETE MEDICARE REPLACEMENT Phone: [phone]    Subscriber: Jose Shaikh Subscriber#: [id number]    Group#: [id number] Precert#: --        [place] Phone: [phone]    Subscriber: Jose Shaikh Subscriber#: [id number]    Group#: -- Precert#: [id number]             History & Physical      Darrin Lamar MD at 23 1703           [facility] HISTORY AND PHYSICAL  Date: 2023   Patient Name: Jose Shaikh  : 1960  MRN: [id number]  Primary Care Physician:  Delia Cervantes, VALENTÍN  Date of admission: 2023    Subjective   Subjective     Chief Complaint: Hip pain    HPI:    Jose Shaikh is a 62 y.o. male past medical history of osteomyelitis, type 2 diabetes, hypertension, A-fib, dyslipidemia, obesity with BMI of 48, who was recently hospital for osteomyelitis and he returns due to hip pain.      Patient was recently admitted to the hospital in early April for foot infection.  There was recommendation for amputation due to osteomyelitis in the foot and the patient refused.  He was unable to be placed at that time.  He is a sex offender making it very difficult for placement although [facility] was an option.  As result, he opted to go home on oral antibiotics to treat Alcaligenes facialis and Morganella morganii with doxycycline and ciprofloxacin.  The patient was seen in wound care in middle of April and at that time Dr. Daniels attempted to switch antibiotics to Levaquin per pharmacy recommendations based off culture data but was unable to get hold the patient so he was never switched.  The patient states the only reason he came to the emergency department was because his left hip was hurting.  Unaware of fevers.  Chills.  Cough.  Shortness of breath.  The patient came to  the ER for hip pain.      In the emergency department the patient's vital signs are as follows temperature is 103.4, pulse is 139, blood pressure is 92/65, 95% on room air.  CBC shows a white blood cell count of 20.55 with neutrophils of 88.2.  CRP is 20.53, which is up from 7.4 in April and lactate is 2.6.  Sed rate was 42 in April and is currently 75.  Bicarb is 19.9 and sodium is 130.  Urinalysis shows positive nitrite and small leuk esterase but no bacteria.  Patient also seems to have a pneumonia in his right upper lobe.  X-ray of the right foot shows evidence of osteomyelitis involving the distal aspect of the cuboid and gas in the soft tissue along the plantar aspect of the foot just proximal amputation site and periostitis involving the cuneiform.  Patient got 1 L of normal saline.  Patient received Zosyn and vancomycin for antibiotics.  Podiatry was consulted and did not feel like the foot was the source.  Patient will be admitted to the hospital for severe sepsis with unclear source at this time.  The source could be pneumonia, urinary tract infection or most likely the foot.  Patient will also be managed with A-fib with RVR    All systems reviewed abnormalities noted above    Personal History     Past Medical History:  Osteomyelitis  Type 2 diabetes  Hypertension  A-fib   Dyslipidemia  Obesity with BMI 48    Past Surgical History:  Cyst removal  Incision and drainage  Incision and drainage of the leg  Other surgical history  Toe surgery  Transmetatarsal amputation    Family History:   Cancer  Heart disease    Social History:   Former cigarette smoker.  Rare alcohol use    Home Medications:  Dulaglutide, Insulin Lispro, acetaminophen, apixaban, ciprofloxacin, doxycycline, fluticasone, gabapentin, insulin detemir, lisinopril, meloxicam, metFORMIN, metoprolol succinate XL, and simvastatin    Allergies:  Allergies   Allergen Reactions   • Adhesive Tape Rash       Objective   Objective     Vitals:   Temp:   [99.2 °F (37.3 °C)-103.4 °F (39.7 °C)] 102.4 °F (39.1 °C)  Heart Rate:  [] 148  Resp:  [18] 18  BP: ()/(59-99) 117/75    Physical Exam    Constitutional: Awake, alert, no acute distress   Eyes: Pupils equal, sclerae anicteric, no conjunctival injection   HENT: NCAT, mucous membranes moist   Neck: Supple, no thyromegaly, no lymphadenopathy, trachea midline   Respiratory coarse breath sounds.  Body habitus makes it difficult to get a good lung exam   Cardiovascular: RRR, no murmurs, rubs, or gallops, palpable pedal pulses bilaterally   Gastrointestinal: Positive bowel sounds, soft, nontender, nondistended   Musculoskeletal: No bilateral ankle edema, no clubbing or cyanosis to extremities   Psychiatric: Appropriate affect, cooperative   Neurologic: Oriented x 3, strength symmetric in all extremities, Cranial Nerves grossly intact to confrontation, speech clear   Skin: No rashes.  Right foot shows amputation transmetatarsal.  There is a dehisced wound there is area of warmth and redness.    Result Review    Result Review:  I have personally reviewed the results from the time of this admission to 5/12/2023 17:03 EDT and agree with these findings: White blood cell count is 20,000  White blood cell count is 20,000  CRP is 20  ESR 75  Lactate is 2.6  Procalcitonin is 1.3  Bicarb is 19.9 with mild anion gap of 12    X-ray of right foot shows evidence of osteomyelitis involving the distal aspect of the cuboid gas and soft tissue along the plantar aspect of the foot just proximal amputation site  Periostitis of the cuneiform.    Chest x-ray shows right upper lobe pneumonia      Assessment & Plan   Assessment / Plan     Assessment/Plan:   Severe sepsis present on admission (fever, tachycardia, leukocytosis)  Osteomyelitis of the right foot (increased CRP, increased sed rate from April) with wound culture of Alcaligenes and Morganella from 4/3  Right upper lobe pneumonia  A-fib with RVR  Abnormal urinalysis with  nitrite positive and leuk esterase small  Type 2 diabetes with hyperglycemia  Hyponatremia  Lactic acidosis    Jose is a 62-year-old gentleman with past medical history of obesity, type 2 diabetes, and osteomyelitis of the right foot status post transmetatarsal amputation.  He was recently admitted to the hospital early April with recommendation to do a further amputation but he refused as he could not be placed into a rehab facility.  He chose to be discharged home on oral antibiotics with Doxy and Cipro.  The wound culture grew Alcaligenes and Morganella which is susceptible to Levaquin and resistant to Doxy.  The patient presents today with a fever to 103, tachycardia with A-fib with RVR, and a leukocytosis to 20,000.  CRP is up from 7->20 and ESR is up from 46->75.  X-ray shows findings consistent with osteomyelitis and then there is an area concerning for gas.  Patient also has a chest x-ray that shows right upper lobe pneumonia.  With a procalcitonin of 1.30.  Patient was seen by podiatry in the emergency department and did not feel that the foot wound was causing the patient's presentation.  However, I am concerned as the patient has no other signs symptoms of pneumonia at this time.  I will treat him as if the foot is the source with Zyvox for toxin mediating properties and Zosyn to cover gram-negative's and anaerobes.  Patient will be treated for sepsis due to unclear source at this time.        Plan:  -- Admit to hospitalist service  -- Consult podiatry  -- We will start with Zyvox and Zosyn due to the concern for toxin forming bacteria in the foot wound and lack of access to clindamycin  -- We will bolus a second liter of lactated Ringer's  -- Strep and Legionella urine antigen for possible pneumonia  -- Respiratory culture and blood culture  -- Procalcitonin now and again in the morning  -- Oxygen for saturations less than 90% although he does not require oxygen at this time  -- Repeat CRP in the  morning  -- We will continue diltiazem drip for A-fib with RVR  -- Sliding scale      DVT prophylaxis:  Lovenox      CODE STATUS:     Full code    Admission Status:  I believe this patient meets admission status.    Electronically signed by Darrin Lamar MD, 23, 5:03 PM EDT.             Electronically signed by Darrin Lamar MD at 23 1134          Physician Progress Notes (most recent note)      Max Mehta MD at 23 1018           [facility] Progress Note  Date: 2023  Patient Name: Jose Shaikh  : 1960  MRN: [id number]  Date of admission: 2023      Subjective   Subjective     Chief Complaint: Left hip pain    Summary:   Jose Shaikh is a 62 y.o. male past medical history of osteomyelitis, type 2 diabetes, hypertension, A-fib, dyslipidemia, obesity with BMI of 48, who was recently hospital for osteomyelitis and he returns due to hip pain.      Patient was recently admitted to the hospital in early April for foot infection.  There was recommendation for amputation due to osteomyelitis in the foot and the patient refused.  He was unable to be placed at that time.  He is a sex offender making it very difficult for placement although [facility] was an option.  As result, he opted to go home on oral antibiotics to treat Alcaligenes facialis and Morganella morganii with doxycycline and ciprofloxacin.  The patient was seen in wound care in middle of April and at that time Dr. Daniels attempted to switch antibiotics to Levaquin per pharmacy recommendations based off culture data but was unable to get hold the patient so he was never switched.  The patient states the only reason he came to the emergency department was because his left hip was hurting.  Unaware of fevers.  Chills.  Cough.  Shortness of breath.  The patient came to the ER for hip pain.    Patient blood culture grew Streptococcus agalactiae.  Zyvox DC'd.  Patient does not want any amputation.   Patient wound culture from right foot also grew Streptococcus.  X-ray of pelvis and hip shows DJD of bilateral hips left worse than right.  Patient refused MRI of the left hip and order was canceled as patient is very claustrophobic.  Per ID recommendation needs 6 weeks of oral amoxicillin for osteomyelitis of right foot.  Patient is doing well, he is weak and debilitated however and Social work is arranging placement in a rehab facility for patient    Interval Followup: No acute events overnight, patient resting comfortably in bed    Objective   Objective     Vitals:   Temp:  [97.7 °F (36.5 °C)-98.2 °F (36.8 °C)] 97.7 °F (36.5 °C)  Heart Rate:  [65-70] 65  Resp:  [18] 18  BP: (106-127)/(50-63) 108/53  Physical Exam      GEN: No acute distress, resting comfortably  HEENT: Moist mucous membranes  LUNGS: Equal chest rise bilaterally  CARDIAC: Regular rate and rhythm  NEURO: Moving all 4 extremities spontaneously     Result Review    Result Review:  I have personally reviewed the results for the past 24 hours and agree with these findings:  [x]  Laboratory  [x]  Microbiology  [x]  Radiology  [x]  EKG/Telemetry   []  Cardiology/Vascular   []  Pathology  [x]  Old records  [x]  Other: Medications    Assessment & Plan   Assessment / Plan     Assessment:  Sepsis present on admission.  Patient presented with fever, tachycardia, lactic acidosis, elevated procalcitonin and leukocytosis.  Resolved  Right upper lobe healthcare associated pneumonia .  Streptococcus agalactiae bacteremia.  Subsequent cultures negative  Chronic osteomyelitis of right foot involving cuboid and may be cuneiform.  On p.o. amoxicillin for 6 weeks  Cellulitis of right foot due to Streptococcus agalactiae.  Paroxysmal A-fib with intermittent RVR on Eliquis.  Bilateral diabetic foot ulcers.  NIDDM.  Most recent A1c 6.6 in April 2023  Morbid obesity BMI of 48.1.  S/p TMA of right foot.  Hyponatremia.  likely from hyperglycemia/volume overload.  Clinically  significant.  Improved  Hypophosphatemia.  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  Chronic shoulder neck pain    Plan:  Patient refused MRI of left hip.  X-ray showed arthritis  Continue to fluid restriction for hyponatremia.  This is improved  Finished 6 days of Rocephin, continue 6 weeks of oral Amoxil for osteomyelitis  Right foot is likely source of bacteremia as per ID.  Wound culture and blood culture noted.  Repeat blood cultures final results with no growth.  Appreciate podiatry input.  Per podiatry right foot wound not source of infection?  Patient does not want below-knee amputation, will start treatment with antibiotics for now  Continue midodrine, monitor blood pressure  Continue p.o. digoxin  Continue long-acting p.o. Cardizem.    Continue home metoprolol at increased dose, titrate to effect  Continue IV and oral narcotics for pain control.  Continue on Lyrica.  Continue basal bolus insulin, titrate to effect  Heating pad to neck and shoulder pain areas, pain improving  Continue Flexeril for muscle spasm, monitor for sedation  Continue wound care recommendations.    Continue PT/OT    Reviewed patients labs and imaging, and discussed with patient and nurse at bedside.    DVT prophylaxis:  Medical DVT prophylaxis orders are present.  Home Eliquis    CODE STATUS:   Level Of Support Discussed With: Patient  Code Status (Patient has no pulse and is not breathing): CPR (Attempt to Resuscitate)  Medical Interventions (Patient has pulse or is breathing): Full Support    Electronically signed by Max Mehta MD, 23, 10:18 AM EDT.                                                       Electronically signed by Max Mehta MD at 23 1020          Physical Therapy Notes (most recent note)      Mckenna Wong PTA at 23 1023  Version 1 of 1         Acute Care - Physical Therapy Treatment Note  KEO Dominguez     Patient Name: Jose Shaikh  : 1960  MRN:  [id number]  Today's Date: 5/23/2023      Visit Dx:     ICD-10-CM ICD-9-CM   1. Acute febrile illness  R50.9 780.60   2. Sepsis without acute organ dysfunction, due to unspecified organism  A41.9 038.9     995.91   3. Pneumonia of right upper lobe due to infectious organism  J18.9 486   4. Uncontrolled type 2 diabetes mellitus with hyperglycemia  E11.65 250.02   5. Atrial fibrillation with rapid ventricular response  I48.91 427.31   6. Acute osteomyelitis of right foot  M86.171 730.07   7. Decreased activities of daily living (ADL)  Z78.9 V49.89   8. Difficulty in walking  R26.2 719.7     Patient Active Problem List   Diagnosis   • Diabetic ulcer of left heel associated with type 2 DM   • Acute osteomyelitis of left calcaneus    • Diabetic ulcer of left heel associated with type 2 DM   • Diabetic ulcer of right midfoot associated with type 2 DM   • Paroxysmal atrial fibrillation   • Essential hypertension   • Hyperlipidemia LDL goal <100   • Cellulitis and abscess of foot   • High alkaline phosphatase level   • Osteomyelitis   • Onychomycosis   • Onychocryptosis   • Foot pain, bilateral   • Osteomyelitis of foot, right, acute   • Cellulitis of right foot   • Type 2 diabetes mellitus, with long-term current use of insulin   • Class 3 severe obesity due to excess calories with serious comorbidity and body mass index (BMI) of 45.0 to 49.9 in adult   • Anxiety disorder, unspecified   • Claustrophobia   • Dependence on wheelchair   • Depression, unspecified   • Long term (current) use of anticoagulants   • Long term (current) use of oral hypoglycemic drugs   • Wound of foot   • Non-prs chronic ulcer oth prt r foot limited to brkdwn skin   • Orthostatic hypotension   • Other chronic osteomyelitis, right ankle and foot   • Personal history of nicotine dependence   • Thrombocytopenia, unspecified   • Unspecified open wound, right foot, initial encounter   • Diabetic foot infection   • Subacute osteomyelitis of right foot   •  Right foot pain   • Sepsis   • Onychomycosis   • Foot pain, left     Past Medical History:   Diagnosis Date   • Absence of toe of right foot    • Acute osteomyelitis of left calcaneus  8/18/2021   • Anxiety and depression    • Arthritis    • Claustrophobia    • Corns and callus    • Diabetic ulcer of left heel associated with type 2 DM 8/18/2021   • Diabetic ulcer of left heel associated with type 2 DM 7/6/2021   • Diabetic ulcer of right midfoot associated with type 2 DM 8/18/2021   • Difficulty walking    • Essential hypertension 8/31/2021   • Hammertoe    • Hyperlipidemia LDL goal <100 8/31/2021   • Ingrown toenail    • Obesity    • Paroxysmal atrial fibrillation 8/31/2021   • Polyneuropathy    • Pressure ulcer, stage 1    • Tinea unguium    • Type 2 diabetes mellitus with polyneuropathy      Past Surgical History:   Procedure Laterality Date   • CYST REMOVAL      center of back; benign   • INCISION AND DRAINAGE ABSCESS      back   • INCISION AND DRAINAGE LEG Right 12/10/2021    Procedure: INCISION AND DRAINAGE LOWER EXTREMITY;  Surgeon: Ash Leyva DPM;  Location: [facility] OR;  Service: Podiatry;  Laterality: Right;   • OTHER SURGICAL HISTORY      Surgical clips left foot   • TOE SURGERY Right     Removal of 5th toe   • TRANS METATARSAL AMPUTATION Right 12/2/2021    Procedure: AMPUTATION TRANS METATARSAL;  Surgeon: Ash Leyva DPM;  Location: [facility] OR;  Service: Podiatry;  Laterality: Right;   • WRIST SURGERY Left     repair of injury     PT Assessment (last 12 hours)     PT Evaluation and Treatment     Row Name 05/23/23 1020          Physical Therapy Time and Intention    Subjective Information complains of;weakness;fatigue;pain  -DK     Document Type therapy note (daily note)  -DK     Mode of Treatment individual therapy;physical therapy  -DK     Patient Effort good  -DK     Symptoms Noted During/After Treatment fatigue;increased pain  -DK     Comment Pt is reporting greatly  increased pain levels today.  -     Row Name 05/23/23 1020          Pain    Pretreatment Pain Rating 9/10  -DK     Posttreatment Pain Rating 9/10  -DK     Pain Location - Side/Orientation Bilateral  -DK     Pain Location generalized  -DK     Pain Location - hip;knee;foot  -DK     Pain Intervention(s) Distraction;Therapeutic presence  -     Row Name 05/23/23 1020          Cognition    Affect/Mental Status (Cognition) WFL  -DK     Orientation Status (Cognition) oriented x 4  -DK     Follows Commands (Cognition) WFL  -DK     Cognitive Function WFL  -DK     Personal Safety Interventions gait belt;nonskid shoes/slippers when out of bed;supervised activity  -     Row Name 05/23/23 1020          Motor Skills    Motor Skills --  therapeutic exercises  -DK     Coordination WFL  -DK     Therapeutic Exercise hip;knee;ankle  -     Additional Documentation --  Pt declined transfers this session due to increased pain levels.  -     Row Name 05/23/23 1020          Hip (Therapeutic Exercise)    Hip (Therapeutic Exercise) AAROM (active assistive range of motion)  -     Hip AAROM (Therapeutic Exercise) bilateral;flexion;extension;aBduction;aDduction;supine;10 repetitions;2 sets  -     Row Name 05/23/23 1020          Knee (Therapeutic Exercise)    Knee (Therapeutic Exercise) AAROM (active assistive range of motion)  -     Knee AAROM (Therapeutic Exercise) bilateral;flexion;extension;supine;10 repetitions;2 sets  -     Row Name 05/23/23 1020          Ankle (Therapeutic Exercise)    Ankle (Therapeutic Exercise) AROM (active range of motion)  -     Ankle AROM (Therapeutic Exercise) bilateral;dorsiflexion;plantarflexion;supine;20 repititions  -     Row Name             Wound 12/02/21 Right anterior foot Incision    Wound - Properties Group Placement Date: 12/02/21  -ES Side: Right  -ES Orientation: anterior  -ES Location: foot  -ES Primary Wound Type: Incision  -ES    Retired Wound - Properties Group Placement Date:  12/02/21  -ES Side: Right  -ES Orientation: anterior  -ES Location: foot  -ES Primary Wound Type: Incision  -ES    Retired Wound - Properties Group Date first assessed: 12/02/21  -ES Side: Right  -ES Location: foot  -ES Primary Wound Type: Incision  -ES    Row Name             Wound 06/22/21 1133 Left lateral heel    Wound - Properties Group Placement Date: 06/22/21  -FABIOLA Placement Time: 1133  -FABIOLA Present on Hospital Admission: Y  -FABIOLA Side: Left  -FABIOLA Orientation: lateral  -FABIOLA Location: heel  -FABIOLA    Retired Wound - Properties Group Placement Date: 06/22/21  -FABIOLA Placement Time: 1133  -FABIOLA Present on Hospital Admission: Y  -FABIOLA Side: Left  -FABIOLA Orientation: lateral  -FABIOLA Location: heel  -FABIOLA    Retired Wound - Properties Group Date first assessed: 06/22/21  -FABIOLA Time first assessed: 1133  -FABIOLA Present on Hospital Admission: Y  -FABIOLA Side: Left  -FABIOLA Location: heel  -FABIOLA    Row Name             Wound 05/19/23 1317 gluteal Blisters    Wound - Properties Group Placement Date: 05/19/23  -RASHARD Placement Time: 1317  -RASHARD Present on Hospital Admission: N  -RASHARD Location: gluteal  -RASHARD Primary Wound Type: Blisters  -RASHARD    Retired Wound - Properties Group Placement Date: 05/19/23  -RASHARD Placement Time: 1317  -RASHARD Present on Hospital Admission: N  -RASHARD Location: gluteal  -RASHARD Primary Wound Type: Blisters  -RASHARD    Retired Wound - Properties Group Date first assessed: 05/19/23  -RASHARD Time first assessed: 1317  -RASHARD Present on Hospital Admission: N  -RASHARD Location: gluteal  -RASHARD Primary Wound Type: Blisters  -RASHARD    Row Name 05/23/23 1020          Plan of Care Review    Plan of Care Reviewed With patient  -DK     Progress no change  -DK     Row Name 05/23/23 1020          Positioning and Restraints    Pre-Treatment Position in bed  -DK     Post Treatment Position bed  -DK     In Bed supine;call light within reach;encouraged to call for assist;exit alarm on;side rails up x2;legs elevated;heels elevated  -DK     Row Name 05/23/23 1020          Therapy  Assessment/Plan (PT)    Rehab Potential (PT) fair, will monitor progress closely  -DK     Criteria for Skilled Interventions Met (PT) skilled treatment is necessary  -DK     Therapy Frequency (PT) daily  -DK     Problem List (PT) problems related to;balance;mobility;range of motion (ROM);strength;pain  -DK     Activity Limitations Related to Problem List (PT) unable to ambulate safely;unable to transfer safely  -DK     Row Name 05/23/23 1020          Progress Summary (PT)    Progress Toward Functional Goals (PT) progress toward functional goals is fair  -DK           User Key  (r) = Recorded By, (t) = Taken By, (c) = Cosigned By    Initials Name Provider Type    Karon Jordan, RN Registered Nurse    Mckenna Landaverde PTA Physical Therapist Assistant    Marlen Vaughn RN Registered Nurse    Katlyn Garcia RN Registered Nurse                  PT Recommendation and Plan  Planned Therapy Interventions (PT): balance training, bed mobility training, home exercise program, strengthening, transfer training  Therapy Frequency (PT): daily  Progress Summary (PT)  Progress Toward Functional Goals (PT): progress toward functional goals is fair  Plan of Care Reviewed With: patient  Progress: no change   Outcome Measures     Row Name 05/23/23 1019 05/22/23 0904          How much help from another person do you currently need...    Turning from your back to your side while in flat bed without using bedrails? 3  -DK 3  -DK     Moving from lying on back to sitting on the side of a flat bed without bedrails? 3  -DK 3  -DK     Moving to and from a bed to a chair (including a wheelchair)? 2  -DK 1  -DK     Standing up from a chair using your arms (e.g., wheelchair, bedside chair)? 2  -DK 1  -DK     Climbing 3-5 steps with a railing? 1  -DK 1  -DK     To walk in hospital room? 1  -DK 1  -DK     AM-PAC 6 Clicks Score (PT) 12  -DK 10  -DK        Functional Assessment    Outcome Measure Options AM-PAC 6 Clicks Basic  Mobility (PT)  -DK AM-PAC 6 Clicks Basic Mobility (PT)  -DK           User Key  (r) = Recorded By, (t) = Taken By, (c) = Cosigned By    Initials Name Provider Type    Mckenna Landaverde PTA Physical Therapist Assistant                 Time Calculation:    PT Charges     Row Name 23 1022             Time Calculation    PT Received On 23  -DK      PT Goal Re-Cert Due Date 23  -DK         Timed Charges    28728 - PT Therapeutic Exercise Minutes 14  -DK      63868 - PT Therapeutic Activity Minutes 3  -DK         Total Minutes    Timed Charges Total Minutes 17  -DK       Total Minutes 17  -DK            User Key  (r) = Recorded By, (t) = Taken By, (c) = Cosigned By    Initials Name Provider Type    Mckenna Landaverde PTA Physical Therapist Assistant              Therapy Charges for Today     Code Description Service Date Service Provider Modifiers Qty    47876392981 HC PT THER PROC EA 15 MIN 2023 Mckenna Wong PTA GP 1    80769874324 HC PT THERAPEUTIC ACT EA 15 MIN 2023 Mckenna Wong PTA GP 1    51883090235 HC PT THER PROC EA 15 MIN 2023 Mckenna Wong, CURT[place] GP 1          PT G-Codes  Outcome Measure Options: AM-PAC 6 Clicks Basic Mobility (PT)  AM-PAC 6 Clicks Score (PT): 12  AM-PAC 6 Clicks Score (OT): 14    Mckenna Wong PTA  2023      Electronically signed by Mckenna Wong PTA at 23 1023          Occupational Therapy Notes (most recent note)      Michelle Howard, OT at 23 1440          Patient Name: Jose Shaikh  : 1960    MRN: [id number]                              Today's Date: 2023       Admit Date: 2023    Visit Dx:     ICD-10-CM ICD-9-CM   1. Acute febrile illness  R50.9 780.60   2. Sepsis without acute organ dysfunction, due to unspecified organism  A41.9 038.9     995.91   3. Pneumonia of right upper lobe due to infectious organism  J18.9 486   4. Uncontrolled type 2 diabetes mellitus with hyperglycemia  E11.65 250.02   5. Atrial  fibrillation with rapid ventricular response  I48.91 427.31   6. Acute osteomyelitis of right foot  M86.171 730.07   7. Decreased activities of daily living (ADL)  Z78.9 V49.89   8. Difficulty in walking  R26.2 719.7     Patient Active Problem List   Diagnosis   • Diabetic ulcer of left heel associated with type 2 DM   • Acute osteomyelitis of left calcaneus    • Diabetic ulcer of left heel associated with type 2 DM   • Diabetic ulcer of right midfoot associated with type 2 DM   • Paroxysmal atrial fibrillation   • Essential hypertension   • Hyperlipidemia LDL goal <100   • Cellulitis and abscess of foot   • High alkaline phosphatase level   • Osteomyelitis   • Onychomycosis   • Onychocryptosis   • Foot pain, bilateral   • Osteomyelitis of foot, right, acute   • Cellulitis of right foot   • Type 2 diabetes mellitus, with long-term current use of insulin   • Class 3 severe obesity due to excess calories with serious comorbidity and body mass index (BMI) of 45.0 to 49.9 in adult   • Anxiety disorder, unspecified   • Claustrophobia   • Dependence on wheelchair   • Depression, unspecified   • Long term (current) use of anticoagulants   • Long term (current) use of oral hypoglycemic drugs   • Wound of foot   • Non-prs chronic ulcer oth prt r foot limited to brkdwn skin   • Orthostatic hypotension   • Other chronic osteomyelitis, right ankle and foot   • Personal history of nicotine dependence   • Thrombocytopenia, unspecified   • Unspecified open wound, right foot, initial encounter   • Diabetic foot infection   • Subacute osteomyelitis of right foot   • Right foot pain   • Sepsis   • Onychomycosis   • Foot pain, left     Past Medical History:   Diagnosis Date   • Absence of toe of right foot    • Acute osteomyelitis of left calcaneus  8/18/2021   • Anxiety and depression    • Arthritis    • Claustrophobia    • Corns and callus    • Diabetic ulcer of left heel associated with type 2 DM 8/18/2021   • Diabetic ulcer of  left heel associated with type 2 DM 7/6/2021   • Diabetic ulcer of right midfoot associated with type 2 DM 8/18/2021   • Difficulty walking    • Essential hypertension 8/31/2021   • Hammertoe    • Hyperlipidemia LDL goal <100 8/31/2021   • Ingrown toenail    • Obesity    • Paroxysmal atrial fibrillation 8/31/2021   • Polyneuropathy    • Pressure ulcer, stage 1    • Tinea unguium    • Type 2 diabetes mellitus with polyneuropathy      Past Surgical History:   Procedure Laterality Date   • CYST REMOVAL      center of back; benign   • INCISION AND DRAINAGE ABSCESS      back   • INCISION AND DRAINAGE LEG Right 12/10/2021    Procedure: INCISION AND DRAINAGE LOWER EXTREMITY;  Surgeon: Ash Leyva DPM;  Location: [facility] MAIN OR;  Service: Podiatry;  Laterality: Right;   • OTHER SURGICAL HISTORY      Surgical clips left foot   • TOE SURGERY Right     Removal of 5th toe   • TRANS METATARSAL AMPUTATION Right 12/2/2021    Procedure: AMPUTATION TRANS METATARSAL;  Surgeon: Ash Leyva DPM;  Location: [facility] MAIN OR;  Service: Podiatry;  Laterality: Right;   • WRIST SURGERY Left     repair of injury      General Information     Row Name 05/24/23 1438 05/24/23 1420       OT Time and Intention    Document Type therapy note (daily note)  - re-evaluation  -    Mode of Treatment individual therapy;occupational therapy  - individual therapy;occupational therapy  -    Row Name 05/24/23 1420          General Information    Existing Precautions/Restrictions fall  -     Row Name 05/24/23 1420          Cognition    Orientation Status (Cognition) oriented x 4  -     Row Name 05/24/23 1420          Safety Issues, Functional Mobility    Impairments Affecting Function (Mobility) balance;endurance/activity tolerance;pain;range of motion (ROM);strength  -           User Key  (r) = Recorded By, (t) = Taken By, (c) = Cosigned By    Initials Name Provider Type     Michelle Howard OT Occupational Therapist                  Mobility/ADL's     Row Name 05/24/23 1439          Bed Mobility    Comment, (Bed Mobility) Declined transfers this session. Will continue to assess.  -[facility] Name 05/24/23 1439          Activities of Daily Living    BADL Assessment/Intervention bathing;upper body dressing;lower body dressing;grooming;feeding;toileting  -[facility] Name 05/24/23 1439          Mobility    Extremity Weight-bearing Status left lower extremity;right lower extremity  -     Left Lower Extremity (Weight-bearing Status) non weight-bearing (NWB)  Pt can be R heel/L forefoot WB as needed.  -     Right Lower Extremity (Weight-bearing Status) non weight-bearing (NWB)  Pt can be R heel/L forefoot WB as needed.  -[facility] Name 05/24/23 1439          Bathing Assessment/Intervention    [place] Level (Bathing) bathing skills;maximum assist (25% patient effort)  -[facility] Name 05/24/23 1439          Upper Body Dressing Assessment/Training    [place] Level (Upper Body Dressing) upper body dressing skills;minimum assist (75% patient effort)  -[facility] Name 05/24/23 1439          Lower Body Dressing Assessment/Training    [place] Level (Lower Body Dressing) lower body dressing skills;dependent (less than 25% patient effort)  -[facility] Name 05/24/23 1439          Grooming Assessment/Training    [place] Level (Grooming) grooming skills;set up  -LF     Row Name 05/24/23 1439          Toileting Assessment/Training    [place] Level (Toileting) toileting skills;dependent (less than 25% patient effort)  -LF     Row Name 05/24/23 1439          Self-Feeding Assessment/Training    [place] Level (Feeding) feeding skills;set up  -           User Key  (r) = Recorded By, (t) = Taken By, (c) = Cosigned By    Initials Name Provider Type     Michelle Howard OT Occupational Therapist               Obj/Interventions     [facility] Name 05/24/23 1440          Sensory Assessment (Somatosensory)    Sensory Assessment  (Somatosensory) UE sensation intact  -     Row Name 05/24/23 1440          Vision Assessment/Intervention    Visual Impairment/Limitations WFL  -[facility] Name 05/24/23 1440          Range of Motion Comprehensive    General Range of Motion no range of motion deficits identified  -LF     Row Name 05/24/23 1440          Strength Comprehensive (MMT)    Comment, General Manual Muscle Testing (MMT) Assessment 4+/5 LUE and 3+/5 RUE  -[facility] Name 05/24/23 1420          Shoulder (Therapeutic Exercise)    Shoulder (Therapeutic Exercise) strengthening exercise  -     Shoulder Strengthening (Therapeutic Exercise) bilateral;resistance band;yellow;20 repititions;2 sets  Shoulder flexion/extension, forward reach, and horizontal abduction/adduction 20 reps x 2 sets. Unable to perform overhead shoulder flexion/extension using RUE due to prior impairment.  -[facility] Name 05/24/23 1420          Elbow/Forearm (Therapeutic Exercise)    Elbow/Forearm (Therapeutic Exercise) strengthening exercise  -     Elbow/Forearm Strengthening (Therapeutic Exercise) bilateral;flexion;extension;resistance band;yellow;20 repititions;2 sets  -[facility] Name 05/24/23 1440 05/24/23 1420       Motor Skills    Motor Skills coordination;functional endurance  -LF functional endurance  -LF    Coordination WFL  -LF --    Functional Endurance Fair  -LF Fair  -LF    Therapeutic Exercise -- shoulder;elbow/forearm  -[facility] Name 05/24/23 1440          Balance    Comment, Balance Not assessed, likely impaired.  -LF           User Key  (r) = Recorded By, (t) = Taken By, (c) = Cosigned By    Initials Name Provider Type     Michelle Howard OT Occupational Therapist               Goals/Plan     Row Name 05/24/23 1430          Bed Mobility Goal 1 (OT)    Activity/Assistive Device (Bed Mobility Goal 1, OT) bed mobility activities, all  -LF     [place] Level/Cues Needed (Bed Mobility Goal 1, OT) modified independence  -LF     Time Frame (Bed  Mobility Goal 1, OT) long term goal (LTG);10 days  -LF     Progress/Outcomes (Bed Mobility Goal 1, OT) new goal  -LF     Row Name 05/24/23 1430          Transfer Goal 1 (OT)    Activity/Assistive Device (Transfer Goal 1, OT) transfers, all  -LF     [place] Level/Cues Needed (Transfer Goal 1, OT) modified independence  -LF     Time Frame (Transfer Goal 1, OT) long term goal (LTG);10 days  -LF     Progress/Outcome (Transfer Goal 1, OT) continuing progress toward goal  -LF     Row Name 05/24/23 1430          Bathing Goal 1 (OT)    Activity/Device (Bathing Goal 1, OT) bathing skills, all  -LF     [place] Level/Cues Needed (Bathing Goal 1, OT) modified independence  -LF     Time Frame (Bathing Goal 1, OT) long term goal (LTG);10 days  -LF     Progress/Outcomes (Bathing Goal 1, OT) continuing progress toward goal  -LF     Row Name 05/24/23 1430          Dressing Goal 1 (OT)    Activity/Device (Dressing Goal 1, OT) dressing skills, all  -LF     [place]/Cues Needed (Dressing Goal 1, OT) modified independence  -LF     Time Frame (Dressing Goal 1, OT) long term goal (LTG);10 days  -LF     Progress/Outcome (Dressing Goal 1, OT) continuing progress toward goal  -LF     Row Name 05/24/23 1430          Toileting Goal 1 (OT)    Activity/Device (Toileting Goal 1, OT) toileting skills, all  -LF     [place] Level/Cues Needed (Toileting Goal 1, OT) modified independence  -LF     Time Frame (Toileting Goal 1, OT) long term goal (LTG);10 days  -LF     Progress/Outcome (Toileting Goal 1, OT) continuing progress toward goal  -LF     Row Name 05/24/23 1430          Grooming Goal 1 (OT)    Activity/Device (Grooming Goal 1, OT) grooming skills, all  -LF     [place] (Grooming Goal 1, OT) modified independence  -LF     Time Frame (Grooming Goal 1, OT) long term goal (LTG);10 days  -LF     Progress/Outcome (Grooming Goal 1, OT) continuing progress toward goal  -LF     Row Name 05/24/23 1430          Strength Goal 1 (OT)     Strength Goal 1 (OT) Patient will improve right shoulder strength to 4/5 to support independence with self-care activities and functional transfers  -LF     Time Frame (Strength Goal 1, OT) long term goal (LTG);10 days  -LF     Progress/Outcome (Strength Goal 1, OT) continuing progress toward goal  -LF     Row Name 05/24/23 1430          Problem Specific Goal 1 (OT)    Problem Specific Goal 1 (OT) Patient will improve activity tolerance to fair plus to support independence with ADL activities  -LF     Time Frame (Problem Specific Goal 1, OT) long term goal (LTG);10 days  -LF     Progress/Outcome (Problem Specific Goal 1, OT) continuing progress toward goal  -LF     Row Name 05/24/23 1430          Therapy Assessment/Plan (OT)    Planned Therapy Interventions (OT) activity tolerance training;patient/caregiver education/training;BADL retraining;functional balance retraining;occupation/activity based interventions;strengthening exercise;transfer/mobility retraining  -LF           User Key  (r) = Recorded By, (t) = Taken By, (c) = Cosigned By    Initials Name Provider Type    LF Michelle Howard OT Occupational Therapist               Clinical Impression     Row Name 05/24/23 1423          Pain Assessment    Additional Documentation Pain Scale: FACES Pre/Post-Treatment (Group)  -LF     Row Name 05/24/23 1421          Pain Scale: FACES Pre/Post-Treatment    Pain: FACES Scale, Pretreatment 0-->no hurt  -LF     Posttreatment Pain Rating 2-->hurts little bit  -LF     Pain Location - Side/Orientation Right  -LF     Pain Location - shoulder  -LF     Row Name 05/24/23 1423          Plan of Care Review    Plan of Care Reviewed With patient  -LF     Progress improving  -LF     Outcome Evaluation Patient continues to present with limitations in self-care, functional transfers, balance, and endurance. He would benefit from continued skilled occupational therapy services to maximize independence with ADLs/functional transfers.  Subacute rehab recommended upon discharge from hospital. Bed mobility goal added, otherwise continue with established plan of care and goals.  -     Row Name 05/24/23 1423          Therapy Assessment/Plan (OT)    Rehab Potential (OT) good, to achieve stated therapy goals  -     Criteria for Skilled Therapeutic Interventions Met (OT) yes;meets criteria;skilled treatment is necessary  -     Therapy Frequency (OT) 5 times/wk  -     Row Name 05/24/23 1423          Therapy Plan Review/Discharge Plan (OT)    Anticipated Discharge Disposition (OT) sub acute care setting  -     Row Name 05/24/23 1423          Vital Signs    O2 Delivery Pre Treatment room air  -LF     O2 Delivery Intra Treatment room air  -LF     O2 Delivery Post Treatment room air  -LF           User Key  (r) = Recorded By, (t) = Taken By, (c) = Cosigned By    Initials Name Provider Type    LF Michelle Howard, OT Occupational Therapist               Outcome Measures     Row Name 05/24/23 1425          How much help from another is currently needed...    Putting on and taking off regular lower body clothing? 1  -LF     Bathing (including washing, rinsing, and drying) 2  -LF     Toileting (which includes using toilet bed pan or urinal) 1  -LF     Putting on and taking off regular upper body clothing 3  -LF     Taking care of personal grooming (such as brushing teeth) 3  -LF     Eating meals 4  -LF     AM-PAC 6 Clicks Score (OT) 14  -LF     Row Name 05/24/23 1425          Functional Assessment    Outcome Measure Options AM-PAC 6 Clicks Daily Activity (OT);Optimal Instrument  -LF     Row Name 05/24/23 1425          Optimal Instrument    Optimal Instrument Optimal - 3  -LF     Bending/Stooping 5  -LF     Standing 5  -LF     Reaching 1  -LF     From the list, choose the 3 activities you would most like to be able to do without any difficulty Bending/stooping;Standing;Reaching  -LF     Total Score Optimal - 3 11  -LF           User Key  (r) = Recorded  By, (t) = Taken By, (c) = Cosigned By    Initials Name Provider Type     Michelle Howard OT Occupational Therapist                Occupational Therapy Education     Title: PT OT SLP Therapies (Done)     Topic: Occupational Therapy (Done)     Point: ADL training (Done)     Description:   Instruct learner(s) on proper safety adaptation and remediation techniques during self care or transfers.   Instruct in proper use of assistive devices.              Learning Progress Summary           Patient Acceptance, E,D, DU by PG at 5/15/2023 1303                   Point: Home exercise program (Done)     Description:   Instruct learner(s) on appropriate technique for monitoring, assisting and/or progressing therapeutic exercises/activities.              Learning Progress Summary           Patient Acceptance, E,D, DU by PG at 5/15/2023 1303                   Point: Precautions (Done)     Description:   Instruct learner(s) on prescribed precautions during self-care and functional transfers.              Learning Progress Summary           Patient Acceptance, E,D, DU by PG at 5/15/2023 1303                   Point: Body mechanics (Done)     Description:   Instruct learner(s) on proper positioning and spine alignment during self-care, functional mobility activities and/or exercises.              Learning Progress Summary           Patient Acceptance, E,D, DU by PG at 5/15/2023 1303                               User Key     Initials Effective Dates Name Provider Type Discipline     06/16/21 -  [place] OT Occupational Therapist OT              OT Recommendation and Plan  Planned Therapy Interventions (OT): activity tolerance training, patient/caregiver education/training, BADL retraining, functional balance retraining, occupation/activity based interventions, strengthening exercise, transfer/mobility retraining  Therapy Frequency (OT): 5 times/wk  Plan of Care Review  Plan of Care Reviewed With: patient  Progress:  improving  Outcome Evaluation: Patient continues to present with limitations in self-care, functional transfers, balance, and endurance. He would benefit from continued skilled occupational therapy services to maximize independence with ADLs/functional transfers. Subacute rehab recommended upon discharge from hospital. Bed mobility goal added, otherwise continue with established plan of care and goals.     Time Calculation:    Time Calculation- OT     Row Name 05/24/23 1428             Time Calculation- OT    OT Received On 05/24/23  -LF      OT Goal Re-Cert Due Date 06/02/23  -LF         Timed Charges    39918 - OT Therapeutic Exercise Minutes 10  -LF         Untimed Charges    OT Eval/Re-eval Minutes 10  -LF         Total Minutes    Timed Charges Total Minutes 10  -LF      Untimed Charges Total Minutes 10  -LF       Total Minutes 20  -LF            User Key  (r) = Recorded By, (t) = Taken By, (c) = Cosigned By    Initials Name Provider Type    LF Michelle Howard OT Occupational Therapist              Therapy Charges for Today     Code Description Service Date Service Provider Modifiers Qty    35553371497 HC OT THER PROC EA 15 MIN 5/24/2023 Michelle Howard OT GO 1               Michelle Howard OT  5/24/2023    Electronically signed by Michelle Howard OT at 05/24/23 2140

## 2023-05-25 NOTE — THERAPY RE-EVALUATION
Acute Care - Physical Therapy Re-Evaluation   Angelica     Patient Name: Jose Shaikh  : 1960  MRN: [id number]  Today's Date: 2023      Visit Dx:     ICD-10-CM ICD-9-CM   1. Acute febrile illness  R50.9 780.60   2. Sepsis without acute organ dysfunction, due to unspecified organism  A41.9 038.9     995.91   3. Pneumonia of right upper lobe due to infectious organism  J18.9 486   4. Uncontrolled type 2 diabetes mellitus with hyperglycemia  E11.65 250.02   5. Atrial fibrillation with rapid ventricular response  I48.91 427.31   6. Acute osteomyelitis of right foot  M86.171 730.07   7. Decreased activities of daily living (ADL)  Z78.9 V49.89   8. Difficulty in walking  R26.2 719.7     Patient Active Problem List   Diagnosis   • Diabetic ulcer of left heel associated with type 2 DM   • Acute osteomyelitis of left calcaneus    • Diabetic ulcer of left heel associated with type 2 DM   • Diabetic ulcer of right midfoot associated with type 2 DM   • Paroxysmal atrial fibrillation   • Essential hypertension   • Hyperlipidemia LDL goal <100   • Cellulitis and abscess of foot   • High alkaline phosphatase level   • Osteomyelitis   • Onychomycosis   • Onychocryptosis   • Foot pain, bilateral   • Osteomyelitis of foot, right, acute   • Cellulitis of right foot   • Type 2 diabetes mellitus, with long-term current use of insulin   • Class 3 severe obesity due to excess calories with serious comorbidity and body mass index (BMI) of 45.0 to 49.9 in adult   • Anxiety disorder, unspecified   • Claustrophobia   • Dependence on wheelchair   • Depression, unspecified   • Long term (current) use of anticoagulants   • Long term (current) use of oral hypoglycemic drugs   • Wound of foot   • Non-prs chronic ulcer oth prt r foot limited to brkdwn skin   • Orthostatic hypotension   • Other chronic osteomyelitis, right ankle and foot   • Personal history of nicotine dependence   • Thrombocytopenia, unspecified   • Unspecified open  wound, right foot, initial encounter   • Diabetic foot infection   • Subacute osteomyelitis of right foot   • Right foot pain   • Sepsis   • Onychomycosis   • Foot pain, left     Past Medical History:   Diagnosis Date   • Absence of toe of right foot    • Acute osteomyelitis of left calcaneus  8/18/2021   • Anxiety and depression    • Arthritis    • Claustrophobia    • Corns and callus    • Diabetic ulcer of left heel associated with type 2 DM 8/18/2021   • Diabetic ulcer of left heel associated with type 2 DM 7/6/2021   • Diabetic ulcer of right midfoot associated with type 2 DM 8/18/2021   • Difficulty walking    • Essential hypertension 8/31/2021   • Hammertoe    • Hyperlipidemia LDL goal <100 8/31/2021   • Ingrown toenail    • Obesity    • Paroxysmal atrial fibrillation 8/31/2021   • Polyneuropathy    • Pressure ulcer, stage 1    • Tinea unguium    • Type 2 diabetes mellitus with polyneuropathy      Past Surgical History:   Procedure Laterality Date   • CYST REMOVAL      center of back; benign   • INCISION AND DRAINAGE ABSCESS      back   • INCISION AND DRAINAGE LEG Right 12/10/2021    Procedure: INCISION AND DRAINAGE LOWER EXTREMITY;  Surgeon: Ash Leyva DPM;  Location: [facility] OR;  Service: Podiatry;  Laterality: Right;   • OTHER SURGICAL HISTORY      Surgical clips left foot   • TOE SURGERY Right     Removal of 5th toe   • TRANS METATARSAL AMPUTATION Right 12/2/2021    Procedure: AMPUTATION TRANS METATARSAL;  Surgeon: Ash Leyva DPM;  Location: [facility] OR;  Service: Podiatry;  Laterality: Right;   • WRIST SURGERY Left     repair of injury     PT Assessment (last 12 hours)     PT Evaluation and Treatment     Row Name 05/25/23 1000          Physical Therapy Time and Intention    Subjective Information complains of;weakness;fatigue  -MOE     Document Type re-evaluation  -MOE     Mode of Treatment individual therapy;physical therapy  -MOE     Patient Effort good  -MOE     Row Name  05/25/23 1000          Mobility    Extremity Weight-bearing Status right lower extremity;left lower extremity  -MOE     Left Lower Extremity (Weight-bearing Status) non weight-bearing (NWB)  -MOE     Right Lower Extremity (Weight-bearing Status) non weight-bearing (NWB)  -MOE     Row Name 05/25/23 1000          Safety Issues, Functional Mobility    Impairments Affecting Function (Mobility) balance;endurance/activity tolerance;pain;range of motion (ROM);strength  -MOE     Row Name 05/25/23 1000          Hip (Therapeutic Exercise)    Hip AROM (Therapeutic Exercise) bilateral;aBduction;15 repititions;supine  -MOE     Hip Isometrics (Therapeutic Exercise) bilateral;aBduction;10 repetitions;3 second hold;supine  -MOE     Row Name 05/25/23 1000          Knee (Therapeutic Exercise)    Knee Isometrics (Therapeutic Exercise) bilateral;quad sets;gluteal sets;10 repetitions;3 second hold;2 sets;supine  -MOE     Row Name 05/25/23 1000          Ankle (Therapeutic Exercise)    Ankle AROM (Therapeutic Exercise) bilateral;other (see comments);supine  Ankle pumps x20, and Ankle clock x20  -MOE     Row Name             Wound 12/02/21 Right anterior foot Incision    Wound - Properties Group Placement Date: 12/02/21  -ES Side: Right  -ES Orientation: anterior  -ES Location: foot  -ES Primary Wound Type: Incision  -ES    Retired Wound - Properties Group Placement Date: 12/02/21  -ES Side: Right  -ES Orientation: anterior  -ES Location: foot  -ES Primary Wound Type: Incision  -ES    Retired Wound - Properties Group Date first assessed: 12/02/21  -ES Side: Right  -ES Location: foot  -ES Primary Wound Type: Incision  -ES    Row Name             Wound 06/22/21 1133 Left lateral heel    Wound - Properties Group Placement Date: 06/22/21  -FABIOLA Placement Time: 1133  -FABIOLA Present on Hospital Admission: Y  -FABIOLA Side: Left  -FABIOLA Orientation: lateral  -FABIOLA Location: heel  -FABIOLA    Retired Wound - Properties Group Placement Date: 06/22/21  -FABIOLA Placement Time: 1133   -FABIOLA Present on Hospital Admission: Y  -FABIOLA Side: Left  -FABIOLA Orientation: lateral  -FABIOLA Location: heel  -FABIOLA    Retired Wound - Properties Group Date first assessed: 06/22/21  -FABIOLA Time first assessed: 1133  -FABIOLA Present on Hospital Admission: Y  -FABIOLA Side: Left  -FABIOLA Location: heel  -FABIOLA    Row Name             Wound 05/19/23 1317 gluteal Blisters    Wound - Properties Group Placement Date: 05/19/23  -RASHARD Placement Time: 1317  -RASHARD Present on Hospital Admission: N  -RASHARD Location: gluteal  -RASHARD Primary Wound Type: Blisters  -RASHARD    Retired Wound - Properties Group Placement Date: 05/19/23  -RASHARD Placement Time: 1317  -RASHARD Present on Hospital Admission: N  -RASHARD Location: gluteal  -RASHARD Primary Wound Type: Blisters  -RASHARD    Retired Wound - Properties Group Date first assessed: 05/19/23  -RASHARD Time first assessed: 1317  -RASHARD Present on Hospital Admission: N  -RASHARD Location: gluteal  -RASHARD Primary Wound Type: Blisters  -RASHARD    Row Name 05/25/23 1000          Positioning and Restraints    Pre-Treatment Position in bed  -MOE     Post Treatment Position bed  -MOE     Row Name 05/25/23 1000          Therapy Assessment/Plan (PT)    Rehab Potential (PT) fair, will monitor progress closely  -MOE     Criteria for Skilled Interventions Met (PT) skilled treatment is necessary  -MOE     Therapy Frequency (PT) daily  -MOE     Predicted Duration of Therapy Intervention (PT) 10  -MOE     Problem List (PT) balance;mobility;range of motion (ROM);strength;pain  -MOE     Activity Limitations Related to Problem List (PT) unable to ambulate safely;unable to transfer safely  -MOE     Row Name 05/25/23 1000          Progress Summary (PT)    Progress Toward Functional Goals (PT) unable to show any progress toward functional goals  -MOE     Daily Progress Summary (PT) Since beginning therapy, the patient has regressed functionally due to change in weight-bearing status. The patient has made little improvement toward functional goals since change to non-weight-bearing on the  bilateral lower extremities. Plan of care to be updated to address changes.  -MOE     Row Name 05/25/23 1000          Bed Mobility Goal 1 (PT)    Activity/Assistive Device (Bed Mobility Goal 1, PT) bed mobility activities, all  -MOE     [place] Level/Cues Needed (Bed Mobility Goal 1, PT) minimum assist (75% or more patient effort)  -MOE     Time Frame (Bed Mobility Goal 1, PT) 10 days  -MOE     Progress/Outcomes (Bed Mobility Goal 1, PT) progress slower than expected  -MOE     Row Name 05/25/23 1047 05/25/23 1000       Transfer Goal 1 (PT)    Activity/Assistive Device (Transfer Goal 1, PT) bed-to-chair/chair-to-bed;sliding board  -MOE sit-to-stand/stand-to-sit;bed-to-chair/chair-to-bed;walker, rolling  -MOE    [place] Level/Cues Needed (Transfer Goal 1, PT) maximum assist (25-49% patient effort)  -MOE minimum assist (75% or more patient effort)  -MOE    Time Frame (Transfer Goal 1, PT) long term goal (LTG);10 days  -MOE 10 days  -MOE    Progress/Outcome (Transfer Goal 1, PT) new goal  -MOE goal revised this date  -MOE          User Key  (r) = Recorded By, (t) = Taken By, (c) = Cosigned By    Initials Name Provider Type    Karon Jordan, RN Registered Nurse    Marlen Vaughn RN Registered Nurse    Merlin De La O, PT Physical Therapist    Katlyn Garcia RN Registered Nurse                  PT Recommendation and Plan  Anticipated Discharge Disposition (PT): sub acute care setting  Planned Therapy Interventions (PT): balance training, bed mobility training, neuromuscular re-education, ROM (range of motion), strengthening, stretching, transfer training  Therapy Frequency (PT): daily  Progress Summary (PT)  Progress Toward Functional Goals (PT): unable to show any progress toward functional goals  Daily Progress Summary (PT): Since beginning therapy, the patient has regressed functionally due to change in weight-bearing status. The patient has made little improvement toward functional goals since change  to non-weight-bearing on the bilateral lower extremities. Plan of care to be updated to address changes.   Outcome Measures     Row Name 05/25/23 1000 05/23/23 1019          How much help from another person do you currently need...    Turning from your back to your side while in flat bed without using bedrails? 3  -MOE 3  -DK     Moving from lying on back to sitting on the side of a flat bed without bedrails? 2  -MOE 3  -DK     Moving to and from a bed to a chair (including a wheelchair)? 1  -MOE 2  -DK     Standing up from a chair using your arms (e.g., wheelchair, bedside chair)? 1  -MOE 2  -DK     Climbing 3-5 steps with a railing? 1  -MOE 1  -DK     To walk in hospital room? 1  -MOE 1  -DK     AM-PAC 6 Clicks Score (PT) 9  -MOE 12  -DK        Functional Assessment    Outcome Measure Options -- AM-PAC 6 Clicks Basic Mobility (PT)  -DK           User Key  (r) = Recorded By, (t) = Taken By, (c) = Cosigned By    Initials Name Provider Type    Mckenna Landaverde, PTA Physical Therapist Assistant    Merlin De La O, PT Physical Therapist                 Time Calculation:    PT Charges     Row Name 05/25/23 1031             Time Calculation    PT Received On 05/25/23  -MOE      PT Goal Re-Cert Due Date 06/03/23  -MOE         Timed Charges    04583 - PT Therapeutic Exercise Minutes 15  -MOE         Untimed Charges    PT Eval/Re-eval Minutes 20  -MOE         Total Minutes    Timed Charges Total Minutes 15  -MOE      Untimed Charges Total Minutes 20  -MOE       Total Minutes 35  -MOE            User Key  (r) = Recorded By, (t) = Taken By, (c) = Cosigned By    Initials Name Provider Type    Merlin De La O, PT Physical Therapist              Therapy Charges for Today     Code Description Service Date Service Provider Modifiers Qty    62634701465 HC PT THER PROC EA 15 MIN 5/25/2023 Merlin Rao, PT GP 1    73302213296 HC PT RE-EVAL ESTABLISHED PLAN 2 5/25/2023 Merlin Rao, PT GP 1          PT G-Codes  Outcome Measure  Options: AM-PAC 6 Clicks Daily Activity (OT), Optimal Instrument  AM-PAC 6 Clicks Score (PT): 9  AM-PAC 6 Clicks Score (OT): 14    Merlin Rao, PT  5/25/2023

## 2023-05-25 NOTE — SIGNIFICANT NOTE
Wound Eval / Progress Noted    KEO Dominguez     Patient Name: Jose Shaikh  : 1960  MRN: [id number]  Today's Date: 2023                 Admit Date: 2023    Visit Dx:    ICD-10-CM ICD-9-CM   1. Acute febrile illness  R50.9 780.60   2. Sepsis without acute organ dysfunction, due to unspecified organism  A41.9 038.9     995.91   3. Pneumonia of right upper lobe due to infectious organism  J18.9 486   4. Uncontrolled type 2 diabetes mellitus with hyperglycemia  E11.65 250.02   5. Atrial fibrillation with rapid ventricular response  I48.91 427.31   6. Acute osteomyelitis of right foot  M86.171 730.07   7. Decreased activities of daily living (ADL)  Z78.9 V49.89   8. Difficulty in walking  R26.2 719.7       Patient Active Problem List   Diagnosis   • Diabetic ulcer of left heel associated with type 2 DM   • Acute osteomyelitis of left calcaneus    • Diabetic ulcer of left heel associated with type 2 DM   • Diabetic ulcer of right midfoot associated with type 2 DM   • Paroxysmal atrial fibrillation   • Essential hypertension   • Hyperlipidemia LDL goal <100   • Cellulitis and abscess of foot   • High alkaline phosphatase level   • Osteomyelitis   • Onychomycosis   • Onychocryptosis   • Foot pain, bilateral   • Osteomyelitis of foot, right, acute   • Cellulitis of right foot   • Type 2 diabetes mellitus, with long-term current use of insulin   • Class 3 severe obesity due to excess calories with serious comorbidity and body mass index (BMI) of 45.0 to 49.9 in adult   • Anxiety disorder, unspecified   • Claustrophobia   • Dependence on wheelchair   • Depression, unspecified   • Long term (current) use of anticoagulants   • Long term (current) use of oral hypoglycemic drugs   • Wound of foot   • Non-prs chronic ulcer oth prt r foot limited to brkdwn skin   • Orthostatic hypotension   • Other chronic osteomyelitis, right ankle and foot   • Personal history of nicotine dependence   • Thrombocytopenia,  unspecified   • Unspecified open wound, right foot, initial encounter   • Diabetic foot infection   • Subacute osteomyelitis of right foot   • Right foot pain   • Sepsis   • Onychomycosis   • Foot pain, left        Past Medical History:   Diagnosis Date   • Absence of toe of right foot    • Acute osteomyelitis of left calcaneus  8/18/2021   • Anxiety and depression    • Arthritis    • Claustrophobia    • Corns and callus    • Diabetic ulcer of left heel associated with type 2 DM 8/18/2021   • Diabetic ulcer of left heel associated with type 2 DM 7/6/2021   • Diabetic ulcer of right midfoot associated with type 2 DM 8/18/2021   • Difficulty walking    • Essential hypertension 8/31/2021   • Hammertoe    • Hyperlipidemia LDL goal <100 8/31/2021   • Ingrown toenail    • Obesity    • Paroxysmal atrial fibrillation 8/31/2021   • Polyneuropathy    • Pressure ulcer, stage 1    • Tinea unguium    • Type 2 diabetes mellitus with polyneuropathy         Past Surgical History:   Procedure Laterality Date   • CYST REMOVAL      center of back; benign   • INCISION AND DRAINAGE ABSCESS      back   • INCISION AND DRAINAGE LEG Right 12/10/2021    Procedure: INCISION AND DRAINAGE LOWER EXTREMITY;  Surgeon: Ash Leyva DPM;  Location: [facility];  Service: Podiatry;  Laterality: Right;   • OTHER SURGICAL HISTORY      Surgical clips left foot   • TOE SURGERY Right     Removal of 5th toe   • TRANS METATARSAL AMPUTATION Right 12/2/2021    Procedure: AMPUTATION TRANS METATARSAL;  Surgeon: Ash Leyva DPM;  Location: [facility] OR;  Service: Podiatry;  Laterality: Right;   • WRIST SURGERY Left     repair of injury         Physical Assessment:  Wound 06/22/21 1133 Left lateral heel (Active)   Wound Image   05/25/23 1519   Dressing Appearance dry;intact 05/25/23 1519   Closure None 05/25/23 1519   Base moist;red;pink 05/25/23 1519   Periwound dry;intact 05/25/23 1519   Periwound Temperature warm 05/25/23 1519    Periwound Skin Turgor firm 05/25/23 1519   Edges callused;open 05/25/23 1519   Wound Length (cm) 2.1 cm 05/25/23 1519   Wound Width (cm) 1.8 cm 05/25/23 1519   Wound Depth (cm) 0.9 cm 05/25/23 1519   Wound Surface Area (cm^2) 3.78 cm^2 05/25/23 1519   Wound Volume (cm^3) 3.402 cm^3 05/25/23 1519   Drainage Characteristics/Odor serosanguineous 05/25/23 1519   Drainage Amount scant 05/25/23 1519   Care, Wound cleansed with;irrigated with;sterile normal saline 05/25/23 1519   Dressing Care dressing applied;dressing moistened;antimicrobial agent applied;gauze, wet-to-moist;gauze, dry;elastic bandage 05/25/23 1519   Periwound Care absorptive dressing applied 05/25/23 1519       Wound 12/02/21 Right anterior foot Incision (Active)   Wound Image   05/25/23 1519   Dressing Appearance dry;intact 05/25/23 1519   Closure None 05/25/23 1519   Base dry;moist;red 05/25/23 1519   Periwound dry;intact 05/25/23 1519   Periwound Temperature warm 05/25/23 1519   Periwound Skin Turgor firm 05/25/23 1519   Edges callused;open 05/25/23 1519   Drainage Characteristics/Odor serosanguineous 05/25/23 1519   Drainage Amount scant 05/25/23 1519   Care, Wound cleansed with;irrigated with;sterile normal saline 05/25/23 1519   Dressing Care dressing applied;dressing moistened;gauze, wet-to-moist;gauze, dry;elastic bandage 05/25/23 1519   Periwound Care absorptive dressing applied 05/25/23 1519       Wound 05/19/23 1317 gluteal Blisters (Active)   Wound Image   05/25/23 1519   Dressing Appearance dry;intact 05/25/23 1519   Closure None 05/25/23 1519   Base dry;pink;maroon/purple 05/25/23 1519   Periwound dry;intact 05/25/23 1519   Periwound Temperature warm 05/25/23 1519   Periwound Skin Turgor soft 05/25/23 1519   Edges rolled/closed 05/25/23 1519   Drainage Characteristics/Odor serous 05/24/23 1835   Drainage Amount none 05/25/23 1519   Care, Wound cleansed with;sterile normal saline;barrier applied 05/25/23 1519   Dressing Care open to air  05/25/23 1519   Periwound Care barrier ointment applied 05/25/23 1519      Wound Check / Follow-up:  Patient seen today for wound follow-up and dressing change. Blistering to right gluteal aspect has resolved. There is now intact pink tissue and purple/dark red discoloration. Cleansed with normal saline and gauze. Applied barrier cream. Recommending TID application. Wound bases to left heel and right anterior foot with red moist tissue. Cleansed and irrigated with normal saline. Applied betadine moistened gauze to wound beds then covered with dry gauze and secured with gauze roll and elastic bandage. Recommending to continue current treatment. Discussed importance of every two hour turns with patient. Patient refusing to turn every two hours and states he will not be turning due to being in pain when he turns. He states he understands the risks.       Impression: Chronic wounds to bilateral feet. Resolved blistering with discolored tissue.      Short term goals:  Regain skin integrity, skin protection, pressure reduction, moisture prevention, BID dressing changes.      Dottie Guevara RN    5/25/2023    15:55 EDT

## 2023-05-25 NOTE — PLAN OF CARE
Goal Outcome Evaluation:           Progress: no change  Outcome Evaluation: Wound care done as ordered. PRN pain medication given as needed. Patient refused Q2 turns. Vital signs stable, will continue to monitor.

## 2023-05-25 NOTE — PROGRESS NOTES
[facility]   Hospitalist Progress Note  Date: 2023  Patient Name: Jose Shaikh  : 1960  MRN: [id number]  Date of admission: 2023      Subjective   Subjective     Chief Complaint: Left hip pain    Summary:   Jose Shaikh is a 62 y.o. male past medical history of osteomyelitis, type 2 diabetes, hypertension, A-fib, dyslipidemia, obesity with BMI of 48, who was recently hospital for osteomyelitis and he returns due to hip pain.      Patient was recently admitted to the hospital in early April for foot infection.  There was recommendation for amputation due to osteomyelitis in the foot and the patient refused.  He was unable to be placed at that time.  He is a sex offender making it very difficult for placement although [facility] was an option.  As result, he opted to go home on oral antibiotics to treat Alcaligenes facialis and Morganella morganii with doxycycline and ciprofloxacin.  The patient was seen in wound care in middle of April and at that time Dr. Daniels attempted to switch antibiotics to Levaquin per pharmacy recommendations based off culture data but was unable to get hold the patient so he was never switched.  The patient states the only reason he came to the emergency department was because his left hip was hurting.  Unaware of fevers.  Chills.  Cough.  Shortness of breath.  The patient came to the ER for hip pain.    Patient blood culture grew Streptococcus agalactiae.  Zyvox DC'd.  Patient does not want any amputation.  Patient wound culture from right foot also grew Streptococcus.  X-ray of pelvis and hip shows DJD of bilateral hips left worse than right.  Patient refused MRI of the left hip and order was canceled as patient is very claustrophobic.  Per ID recommendation needs 6 weeks of oral amoxicillin for osteomyelitis of right foot.  Patient is doing well, he is weak and debilitated however and Social work is arranging placement in a rehab facility for  patient    Interval Followup: No acute events overnight, patient resting comfortably in bed.  Continues to have left hip pain.  No new issues    Objective   Objective     Vitals:   Temp:  [97.7 °F (36.5 °C)-98.2 °F (36.8 °C)] 97.7 °F (36.5 °C)  Heart Rate:  [63-66] 63  Resp:  [18] 18  BP: (103-117)/(44-59) 108/49  Physical Exam      GEN: No acute distress, resting comfortably  HEENT: Moist mucous membranes  LUNGS: Equal chest rise bilaterally  CARDIAC: Regular rate and rhythm  NEURO: Moving all 4 extremities spontaneously     Result Review    Result Review:  I have personally reviewed the results for the past 24 hours and agree with these findings:  [x]  Laboratory  [x]  Microbiology  [x]  Radiology  [x]  EKG/Telemetry   []  Cardiology/Vascular   []  Pathology  [x]  Old records  [x]  Other: Medications    Assessment & Plan   Assessment / Plan     Assessment:  Sepsis present on admission.  Patient presented with fever, tachycardia, lactic acidosis, elevated procalcitonin and leukocytosis.  Resolved  Right upper lobe healthcare associated pneumonia .  Streptococcus agalactiae bacteremia.  Subsequent cultures negative  Chronic osteomyelitis of right foot involving cuboid and may be cuneiform.  On p.o. amoxicillin for 6 weeks  Cellulitis of right foot due to Streptococcus agalactiae.  Paroxysmal A-fib with intermittent RVR on Eliquis.  Bilateral diabetic foot ulcers.  NIDDM.  Most recent A1c 6.6 in April 2023  Morbid obesity BMI of 48.1.  S/p TMA of right foot.  Hyponatremia.  likely from hyperglycemia/volume overload.  Clinically significant.  Improved  Hypophosphatemia.  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  Chronic shoulder neck pain    Plan:  Patient refused MRI of left hip.  X-ray showed arthritis  Continue to fluid restriction for hyponatremia.  This is improved  Finished 6 days of Rocephin, continue 6 weeks of oral Amoxil for osteomyelitis  Right foot is likely source of bacteremia as per  ID.  Wound culture and blood culture noted.  Repeat blood cultures final results with no growth.  Appreciate podiatry input.  Per podiatry right foot wound not source of infection?  Patient does not want below-knee amputation, will start treatment with antibiotics for now  Continue midodrine, monitor blood pressure  Continue p.o. digoxin  Continue long-acting p.o. Cardizem.    Continue home metoprolol at increased dose, titrate to effect  Continue IV and oral narcotics for pain control.  Continue on Lyrica.  Continue basal bolus insulin, titrate to effect  Heating pad to neck and shoulder pain areas, pain improving  Continue Flexeril for muscle spasm, monitor for sedation  Continue wound care recommendations.    Continue PT/OT    Reviewed patients labs and imaging, and discussed with patient and nurse at bedside.    DVT prophylaxis:  Medical DVT prophylaxis orders are present.  Home Eliquis    CODE STATUS:   Level Of Support Discussed With: Patient  Code Status (Patient has no pulse and is not breathing): CPR (Attempt to Resuscitate)  Medical Interventions (Patient has pulse or is breathing): Full Support  .      Electronically signed by Shekhar Au MD, 05/25/23, 4:44 PM EDT.

## 2023-05-26 LAB
GLUCOSE BLDC GLUCOMTR-MCNC: 195 MG/DL (ref 70–99)
GLUCOSE BLDC GLUCOMTR-MCNC: 282 MG/DL (ref 70–99)
GLUCOSE BLDC GLUCOMTR-MCNC: 301 MG/DL (ref 70–99)
GLUCOSE BLDC GLUCOMTR-MCNC: 315 MG/DL (ref 70–99)

## 2023-05-26 PROCEDURE — 99232 SBSQ HOSP IP/OBS MODERATE 35: CPT | Performed by: INTERNAL MEDICINE

## 2023-05-26 PROCEDURE — 82948 REAGENT STRIP/BLOOD GLUCOSE: CPT

## 2023-05-26 PROCEDURE — 94799 UNLISTED PULMONARY SVC/PX: CPT

## 2023-05-26 PROCEDURE — 63710000001 INSULIN DETEMIR PER 5 UNITS: Performed by: INTERNAL MEDICINE

## 2023-05-26 PROCEDURE — 63710000001 INSULIN LISPRO (HUMAN) PER 5 UNITS

## 2023-05-26 RX ADMIN — DOCUSATE SODIUM 50MG AND SENNOSIDES 8.6MG 2 TABLET: 8.6; 5 TABLET, FILM COATED ORAL at 21:28

## 2023-05-26 RX ADMIN — METOPROLOL SUCCINATE 50 MG: 50 TABLET, EXTENDED RELEASE ORAL at 08:05

## 2023-05-26 RX ADMIN — INSULIN DETEMIR 27 UNITS: 100 INJECTION, SOLUTION SUBCUTANEOUS at 21:28

## 2023-05-26 RX ADMIN — OXYCODONE HYDROCHLORIDE 10 MG: 5 TABLET ORAL at 03:31

## 2023-05-26 RX ADMIN — INSULIN LISPRO 2 UNITS: 100 INJECTION, SOLUTION INTRAVENOUS; SUBCUTANEOUS at 08:05

## 2023-05-26 RX ADMIN — MIDODRINE HYDROCHLORIDE 5 MG: 5 TABLET ORAL at 17:10

## 2023-05-26 RX ADMIN — OXYCODONE HYDROCHLORIDE 10 MG: 5 TABLET ORAL at 12:10

## 2023-05-26 RX ADMIN — PREGABALIN 25 MG: 25 CAPSULE ORAL at 21:28

## 2023-05-26 RX ADMIN — DIGOXIN 125 MCG: 125 TABLET ORAL at 12:10

## 2023-05-26 RX ADMIN — DILTIAZEM HYDROCHLORIDE 120 MG: 120 CAPSULE, COATED, EXTENDED RELEASE ORAL at 08:05

## 2023-05-26 RX ADMIN — PREGABALIN 25 MG: 25 CAPSULE ORAL at 14:34

## 2023-05-26 RX ADMIN — ACETAMINOPHEN 1000 MG: 325 TABLET ORAL at 21:29

## 2023-05-26 RX ADMIN — ATORVASTATIN CALCIUM 10 MG: 10 TABLET, FILM COATED ORAL at 21:28

## 2023-05-26 RX ADMIN — INSULIN LISPRO 5 UNITS: 100 INJECTION, SOLUTION INTRAVENOUS; SUBCUTANEOUS at 21:28

## 2023-05-26 RX ADMIN — MIDODRINE HYDROCHLORIDE 5 MG: 5 TABLET ORAL at 08:05

## 2023-05-26 RX ADMIN — ACETAMINOPHEN 1000 MG: 325 TABLET ORAL at 14:34

## 2023-05-26 RX ADMIN — METOPROLOL SUCCINATE 50 MG: 50 TABLET, EXTENDED RELEASE ORAL at 21:28

## 2023-05-26 RX ADMIN — INSULIN DETEMIR 27 UNITS: 100 INJECTION, SOLUTION SUBCUTANEOUS at 08:05

## 2023-05-26 RX ADMIN — FAMOTIDINE 40 MG: 20 TABLET ORAL at 08:05

## 2023-05-26 RX ADMIN — INSULIN LISPRO 5 UNITS: 100 INJECTION, SOLUTION INTRAVENOUS; SUBCUTANEOUS at 17:10

## 2023-05-26 RX ADMIN — MIDODRINE HYDROCHLORIDE 5 MG: 5 TABLET ORAL at 12:10

## 2023-05-26 RX ADMIN — AMOXICILLIN 1000 MG: 500 CAPSULE ORAL at 15:38

## 2023-05-26 RX ADMIN — PREGABALIN 25 MG: 25 CAPSULE ORAL at 05:01

## 2023-05-26 RX ADMIN — ACETAMINOPHEN 1000 MG: 325 TABLET ORAL at 05:01

## 2023-05-26 RX ADMIN — DOCUSATE SODIUM 50MG AND SENNOSIDES 8.6MG 2 TABLET: 8.6; 5 TABLET, FILM COATED ORAL at 08:05

## 2023-05-26 RX ADMIN — APIXABAN 5 MG: 5 TABLET, FILM COATED ORAL at 21:28

## 2023-05-26 RX ADMIN — AMOXICILLIN 1000 MG: 500 CAPSULE ORAL at 22:10

## 2023-05-26 RX ADMIN — APIXABAN 5 MG: 5 TABLET, FILM COATED ORAL at 08:05

## 2023-05-26 RX ADMIN — AMOXICILLIN 1000 MG: 500 CAPSULE ORAL at 05:00

## 2023-05-26 RX ADMIN — INSULIN LISPRO 4 UNITS: 100 INJECTION, SOLUTION INTRAVENOUS; SUBCUTANEOUS at 12:10

## 2023-05-26 NOTE — PROGRESS NOTES
[facility]   Hospitalist Progress Note  Date: 2023  Patient Name: Jose Shaikh  : 1960  MRN: [id number]  Date of admission: 2023      Subjective   Subjective     Chief Complaint: Left hip pain    Summary:   Jose Shaikh is a 62 y.o. male past medical history of osteomyelitis, type 2 diabetes, hypertension, A-fib, dyslipidemia, obesity with BMI of 48, who was recently hospital for osteomyelitis and he returns due to hip pain.      Patient was recently admitted to the hospital in early April for foot infection.  There was recommendation for amputation due to osteomyelitis in the foot and the patient refused.  He was unable to be placed at that time.  He is a sex offender making it very difficult for placement although [facility] was an option.  As result, he opted to go home on oral antibiotics to treat Alcaligenes facialis and Morganella morganii with doxycycline and ciprofloxacin.  The patient was seen in wound care in middle of April and at that time Dr. Daniels attempted to switch antibiotics to Levaquin per pharmacy recommendations based off culture data but was unable to get hold the patient so he was never switched.  The patient states the only reason he came to the emergency department was because his left hip was hurting.  Unaware of fevers.  Chills.  Cough.  Shortness of breath.  The patient came to the ER for hip pain.    Patient blood culture grew Streptococcus agalactiae.  Zyvox DC'd.  Patient does not want any amputation.  Patient wound culture from right foot also grew Streptococcus.  X-ray of pelvis and hip shows DJD of bilateral hips left worse than right.  Patient refused MRI of the left hip and order was canceled as patient is very claustrophobic.  Per ID recommendation needs 6 weeks of oral amoxicillin for osteomyelitis of right foot.  Patient is doing well, he is weak and debilitated however and Social work is arranging placement in a rehab facility for  patient    Interval Followup: No acute events overnight, patient resting comfortably in bed.  Continues to have left hip pain.  No new issues.  Patient has been nonweightbearing due to feet wounds    Objective   Objective     Vitals:   Temp:  [97.5 °F (36.4 °C)-98.3 °F (36.8 °C)] 98.3 °F (36.8 °C)  Heart Rate:  [65-69] 67  Resp:  [18] 18  BP: (100-123)/(48-66) 120/58  Physical Exam      GEN: No acute distress, resting comfortably  HEENT: Moist mucous membranes  LUNGS: Equal chest rise bilaterally  CARDIAC: Regular rate and rhythm  NEURO: Moving all 4 extremities spontaneously     Result Review    Result Review:  I have personally reviewed the results for the past 24 hours and agree with these findings:  [x]  Laboratory  [x]  Microbiology  [x]  Radiology  [x]  EKG/Telemetry   []  Cardiology/Vascular   []  Pathology  [x]  Old records  [x]  Other: Medications    Assessment & Plan   Assessment / Plan     Assessment:  Sepsis present on admission.  Patient presented with fever, tachycardia, lactic acidosis, elevated procalcitonin and leukocytosis.  Resolved  Right upper lobe healthcare associated pneumonia .  Streptococcus agalactiae bacteremia.  Subsequent cultures negative  Chronic osteomyelitis of right foot involving cuboid and may be cuneiform.  On p.o. amoxicillin for 6 weeks  Cellulitis of right foot due to Streptococcus agalactiae.  Paroxysmal A-fib with intermittent RVR on Eliquis.  Bilateral diabetic foot ulcers.  NIDDM.  Most recent A1c 6.6 in April 2023  Morbid obesity BMI of 48.1.  S/p TMA of right foot.  Hyponatremia.  likely from hyperglycemia/volume overload.  Clinically significant.  Improved  Hypophosphatemia.  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  Chronic shoulder neck pain    Plan:  Patient refused MRI of left hip.  X-ray showed arthritis  Continue to fluid restriction for hyponatremia.  This is improved  Finished 6 days of Rocephin, continue 6 weeks of oral Amoxil for  osteomyelitis  Right foot is likely source of bacteremia as per ID.  Wound culture and blood culture noted.  Repeat blood cultures final results with no growth.  Appreciate podiatry input.  Per podiatry right foot wound not source of infection?  Patient does not want below-knee amputation, will start treatment with antibiotics for now  Continue midodrine, monitor blood pressure  Continue p.o. digoxin  Continue long-acting p.o. Cardizem.    Continue home metoprolol at increased dose, titrate to effect  Continue IV and oral narcotics for pain control.  Continue on Lyrica.  Continue basal bolus insulin, titrate to effect  Heating pad to neck and shoulder pain areas, pain improving  Continue Flexeril for muscle spasm, monitor for sedation  Continue wound care recommendations.    Continue PT/OT    Reviewed patients labs and imaging, and discussed with patient and nurse at bedside.    DVT prophylaxis:  Medical DVT prophylaxis orders are present.  Home Eliquis    CODE STATUS:   Level Of Support Discussed With: Patient  Code Status (Patient has no pulse and is not breathing): CPR (Attempt to Resuscitate)  Medical Interventions (Patient has pulse or is breathing): Full Support  .      Electronically signed by Shekhar Au MD, 05/26/23, 4:41 PM EDT.

## 2023-05-26 NOTE — PLAN OF CARE
Goal Outcome Evaluation:      AOx4. Wound care completed as ordered. Educated provided on importance of turning and repositioning, patient continuing to refuse with staff assistance. Patient does slightly reposition self independently while in bed. Blood glucose monitored. Medicated for c/o pain per mar.

## 2023-05-27 LAB
GLUCOSE BLDC GLUCOMTR-MCNC: 223 MG/DL (ref 70–99)
GLUCOSE BLDC GLUCOMTR-MCNC: 231 MG/DL (ref 70–99)
GLUCOSE BLDC GLUCOMTR-MCNC: 254 MG/DL (ref 70–99)
GLUCOSE BLDC GLUCOMTR-MCNC: 312 MG/DL (ref 70–99)

## 2023-05-27 PROCEDURE — 99232 SBSQ HOSP IP/OBS MODERATE 35: CPT | Performed by: INTERNAL MEDICINE

## 2023-05-27 PROCEDURE — 82948 REAGENT STRIP/BLOOD GLUCOSE: CPT

## 2023-05-27 PROCEDURE — 63710000001 INSULIN LISPRO (HUMAN) PER 5 UNITS

## 2023-05-27 PROCEDURE — 63710000001 INSULIN DETEMIR PER 5 UNITS: Performed by: INTERNAL MEDICINE

## 2023-05-27 RX ADMIN — FAMOTIDINE 40 MG: 20 TABLET ORAL at 08:46

## 2023-05-27 RX ADMIN — AMOXICILLIN 1000 MG: 500 CAPSULE ORAL at 15:00

## 2023-05-27 RX ADMIN — DIGOXIN 125 MCG: 125 TABLET ORAL at 11:29

## 2023-05-27 RX ADMIN — MIDODRINE HYDROCHLORIDE 5 MG: 5 TABLET ORAL at 11:28

## 2023-05-27 RX ADMIN — INSULIN LISPRO 3 UNITS: 100 INJECTION, SOLUTION INTRAVENOUS; SUBCUTANEOUS at 17:11

## 2023-05-27 RX ADMIN — AMOXICILLIN 1000 MG: 500 CAPSULE ORAL at 21:33

## 2023-05-27 RX ADMIN — ATORVASTATIN CALCIUM 10 MG: 10 TABLET, FILM COATED ORAL at 21:33

## 2023-05-27 RX ADMIN — METOPROLOL SUCCINATE 50 MG: 50 TABLET, EXTENDED RELEASE ORAL at 21:33

## 2023-05-27 RX ADMIN — ACETAMINOPHEN 1000 MG: 325 TABLET ORAL at 15:00

## 2023-05-27 RX ADMIN — CYCLOBENZAPRINE 10 MG: 10 TABLET, FILM COATED ORAL at 21:33

## 2023-05-27 RX ADMIN — PREGABALIN 25 MG: 25 CAPSULE ORAL at 06:17

## 2023-05-27 RX ADMIN — ACETAMINOPHEN 1000 MG: 325 TABLET ORAL at 21:33

## 2023-05-27 RX ADMIN — OXYCODONE HYDROCHLORIDE 10 MG: 5 TABLET ORAL at 01:12

## 2023-05-27 RX ADMIN — PREGABALIN 25 MG: 25 CAPSULE ORAL at 21:33

## 2023-05-27 RX ADMIN — MIDODRINE HYDROCHLORIDE 5 MG: 5 TABLET ORAL at 07:54

## 2023-05-27 RX ADMIN — INSULIN LISPRO 3 UNITS: 100 INJECTION, SOLUTION INTRAVENOUS; SUBCUTANEOUS at 08:46

## 2023-05-27 RX ADMIN — PREGABALIN 25 MG: 25 CAPSULE ORAL at 15:00

## 2023-05-27 RX ADMIN — INSULIN LISPRO 5 UNITS: 100 INJECTION, SOLUTION INTRAVENOUS; SUBCUTANEOUS at 21:36

## 2023-05-27 RX ADMIN — APIXABAN 5 MG: 5 TABLET, FILM COATED ORAL at 08:46

## 2023-05-27 RX ADMIN — DILTIAZEM HYDROCHLORIDE 120 MG: 120 CAPSULE, COATED, EXTENDED RELEASE ORAL at 08:46

## 2023-05-27 RX ADMIN — ALUMINUM HYDROXIDE, MAGNESIUM HYDROXIDE, AND DIMETHICONE 15 ML: 400; 400; 40 SUSPENSION ORAL at 22:08

## 2023-05-27 RX ADMIN — INSULIN DETEMIR 30 UNITS: 100 INJECTION, SOLUTION SUBCUTANEOUS at 21:36

## 2023-05-27 RX ADMIN — ACETAMINOPHEN 1000 MG: 325 TABLET ORAL at 06:17

## 2023-05-27 RX ADMIN — METOPROLOL SUCCINATE 50 MG: 50 TABLET, EXTENDED RELEASE ORAL at 08:46

## 2023-05-27 RX ADMIN — INSULIN DETEMIR 27 UNITS: 100 INJECTION, SOLUTION SUBCUTANEOUS at 08:46

## 2023-05-27 RX ADMIN — AMOXICILLIN 1000 MG: 500 CAPSULE ORAL at 07:55

## 2023-05-27 RX ADMIN — APIXABAN 5 MG: 5 TABLET, FILM COATED ORAL at 21:33

## 2023-05-27 RX ADMIN — CYCLOBENZAPRINE 10 MG: 10 TABLET, FILM COATED ORAL at 12:18

## 2023-05-27 RX ADMIN — OXYCODONE HYDROCHLORIDE 10 MG: 5 TABLET ORAL at 16:25

## 2023-05-27 RX ADMIN — MIDODRINE HYDROCHLORIDE 5 MG: 5 TABLET ORAL at 17:11

## 2023-05-27 RX ADMIN — INSULIN LISPRO 4 UNITS: 100 INJECTION, SOLUTION INTRAVENOUS; SUBCUTANEOUS at 12:18

## 2023-05-27 NOTE — PROGRESS NOTES
[facility]   Hospitalist Progress Note  Date: 2023  Patient Name: Jose Shaikh  : 1960  MRN: [id number]  Date of admission: 2023      Subjective   Subjective     Chief Complaint: Left hip pain    Summary:   Jose Shaikh is a 62 y.o. male past medical history of osteomyelitis, type 2 diabetes, hypertension, A-fib, dyslipidemia, obesity with BMI of 48, who was recently hospital for osteomyelitis and he returns due to hip pain.      Patient was recently admitted to the hospital in early April for foot infection.  There was recommendation for amputation due to osteomyelitis in the foot and the patient refused.  He was unable to be placed at that time.  He is a sex offender making it very difficult for placement although [facility] was an option.  As result, he opted to go home on oral antibiotics to treat Alcaligenes facialis and Morganella morganii with doxycycline and ciprofloxacin.  The patient was seen in wound care in middle of April and at that time Dr. Daniels attempted to switch antibiotics to Levaquin per pharmacy recommendations based off culture data but was unable to get hold the patient so he was never switched.  The patient states the only reason he came to the emergency department was because his left hip was hurting.  Unaware of fevers.  Chills.  Cough.  Shortness of breath.  The patient came to the ER for hip pain.    Patient blood culture grew Streptococcus agalactiae.  Zyvox DC'd.  Patient does not want any amputation.  Patient wound culture from right foot also grew Streptococcus.  X-ray of pelvis and hip shows DJD of bilateral hips left worse than right.  Patient refused MRI of the left hip and order was canceled as patient is very claustrophobic.  Per ID recommendation needs 6 weeks of oral amoxicillin for osteomyelitis of right foot.  Patient is doing well, he is weak and debilitated however and Social work is arranging placement in a rehab facility for  patient    Interval Followup: No acute events overnight, patient resting comfortably in bed.  Continues to have left hip pain.  No new issues.  Patient stated he is going to get out of the bed tomorrow with help of therapist as he wants to start walking again  Blood sugar up  Patient has been nonweightbearing due to feet wounds    Objective   Objective     Vitals:   Temp:  [97.7 °F (36.5 °C)-98.1 °F (36.7 °C)] 98 °F (36.7 °C)  Heart Rate:  [66-75] 75  Resp:  [14-20] 14  BP: (113-131)/(57-85) 131/85  Physical Exam      GEN: No acute distress, resting comfortably  HEENT: Moist mucous membranes  LUNGS: Equal chest rise bilaterally  CARDIAC: Regular rate and rhythm  NEURO: Moving all 4 extremities spontaneously     Result Review    Result Review:  I have personally reviewed the results for the past 24 hours and agree with these findings:  [x]  Laboratory  [x]  Microbiology  [x]  Radiology  [x]  EKG/Telemetry   []  Cardiology/Vascular   []  Pathology  [x]  Old records  [x]  Other: Medications    Assessment & Plan   Assessment / Plan     Assessment:  Sepsis present on admission.  Patient presented with fever, tachycardia, lactic acidosis, elevated procalcitonin and leukocytosis.  Resolved  Right upper lobe healthcare associated pneumonia .  Streptococcus agalactiae bacteremia.  Subsequent cultures negative  Chronic osteomyelitis of right foot involving cuboid and may be cuneiform.  On p.o. amoxicillin for 6 weeks  Cellulitis of right foot due to Streptococcus agalactiae.  Paroxysmal A-fib with intermittent RVR on Eliquis.  Bilateral diabetic foot ulcers.  NIDDM.  Most recent A1c 6.6 in April 2023  Morbid obesity BMI of 48.1.  S/p TMA of right foot.  Hyponatremia.  likely from hyperglycemia/volume overload.  Clinically significant.  Improved  Hypophosphatemia.  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  Chronic shoulder neck pain    Plan:  Patient refused MRI of left hip.  X-ray showed arthritis  Continue  to fluid restriction for hyponatremia.  This is improved  Finished 6 days of Rocephin, continue 6 weeks of oral Amoxil for osteomyelitis  Right foot is likely source of bacteremia as per ID.  Wound culture and blood culture noted.  Repeat blood cultures final results with no growth.  Appreciate podiatry input.  Per podiatry right foot wound not source of infection?  Patient does not want below-knee amputation, will start treatment with antibiotics for now  Continue midodrine, monitor blood pressure  Continue p.o. digoxin  Continue long-acting p.o. Cardizem.    Continue home metoprolol at increased dose, titrate to effect  Continue IV and oral narcotics for pain control.  Continue on Lyrica.  Continue basal bolus insulin, titrate to effect  Heating pad to neck and shoulder pain areas, pain improving  Continue Flexeril for muscle spasm, monitor for sedation  Continue wound care recommendations.    Continue PT/OT    Reviewed patients labs and imaging, and discussed with patient and nurse at bedside.    DVT prophylaxis:  Medical DVT prophylaxis orders are present.  Home Eliquis    CODE STATUS:   Level Of Support Discussed With: Patient  Code Status (Patient has no pulse and is not breathing): CPR (Attempt to Resuscitate)  Medical Interventions (Patient has pulse or is breathing): Full Support  .        Electronically signed by Shekhar Au MD, 05/27/23, 4:55 PM EDT.

## 2023-05-27 NOTE — PLAN OF CARE
Goal Outcome Evaluation:   Patient alert and oriented x 4. Medicated with prn pain medication per MAR instructions for back and hip pain. Blood glucose monitored and supplemental insulin administered per MAR instructions. Wound care and skin care completed. Patient expressed frustration due to still being required to adhere to fluid restriction, education provided to patient.

## 2023-05-27 NOTE — PLAN OF CARE
Goal Outcome Evaluation:  Plan of Care Reviewed With: patient        Progress: no change  Outcome Evaluation: No acute changes noted this shift. Medicated for pain and muscle spasms x1, see MAR. Wound care completed per orders. Pt educated on the need for movement to help increase strength, pt agreed to get up to chair tomorrow. Pt in no apparent distress at this time, denies any needs currently, call light in reach.

## 2023-05-28 LAB
GLUCOSE BLDC GLUCOMTR-MCNC: 196 MG/DL (ref 70–99)
GLUCOSE BLDC GLUCOMTR-MCNC: 197 MG/DL (ref 70–99)
GLUCOSE BLDC GLUCOMTR-MCNC: 210 MG/DL (ref 70–99)
GLUCOSE BLDC GLUCOMTR-MCNC: 232 MG/DL (ref 70–99)

## 2023-05-28 PROCEDURE — 97110 THERAPEUTIC EXERCISES: CPT

## 2023-05-28 PROCEDURE — 97530 THERAPEUTIC ACTIVITIES: CPT

## 2023-05-28 PROCEDURE — 99232 SBSQ HOSP IP/OBS MODERATE 35: CPT | Performed by: INTERNAL MEDICINE

## 2023-05-28 PROCEDURE — 63710000001 INSULIN LISPRO (HUMAN) PER 5 UNITS

## 2023-05-28 PROCEDURE — 82948 REAGENT STRIP/BLOOD GLUCOSE: CPT

## 2023-05-28 PROCEDURE — 63710000001 INSULIN DETEMIR PER 5 UNITS: Performed by: INTERNAL MEDICINE

## 2023-05-28 PROCEDURE — 94799 UNLISTED PULMONARY SVC/PX: CPT

## 2023-05-28 RX ADMIN — FAMOTIDINE 40 MG: 20 TABLET ORAL at 08:32

## 2023-05-28 RX ADMIN — APIXABAN 5 MG: 5 TABLET, FILM COATED ORAL at 20:30

## 2023-05-28 RX ADMIN — ACETAMINOPHEN 1000 MG: 325 TABLET ORAL at 15:02

## 2023-05-28 RX ADMIN — PREGABALIN 25 MG: 25 CAPSULE ORAL at 06:39

## 2023-05-28 RX ADMIN — AMOXICILLIN 1000 MG: 500 CAPSULE ORAL at 06:39

## 2023-05-28 RX ADMIN — AMOXICILLIN 1000 MG: 500 CAPSULE ORAL at 16:46

## 2023-05-28 RX ADMIN — MIDODRINE HYDROCHLORIDE 5 MG: 5 TABLET ORAL at 06:39

## 2023-05-28 RX ADMIN — ACETAMINOPHEN 1000 MG: 325 TABLET ORAL at 06:39

## 2023-05-28 RX ADMIN — ATORVASTATIN CALCIUM 10 MG: 10 TABLET, FILM COATED ORAL at 20:29

## 2023-05-28 RX ADMIN — DOCUSATE SODIUM 50MG AND SENNOSIDES 8.6MG 2 TABLET: 8.6; 5 TABLET, FILM COATED ORAL at 08:31

## 2023-05-28 RX ADMIN — OXYCODONE HYDROCHLORIDE 10 MG: 5 TABLET ORAL at 08:37

## 2023-05-28 RX ADMIN — INSULIN LISPRO 3 UNITS: 100 INJECTION, SOLUTION INTRAVENOUS; SUBCUTANEOUS at 17:53

## 2023-05-28 RX ADMIN — DIGOXIN 125 MCG: 125 TABLET ORAL at 15:02

## 2023-05-28 RX ADMIN — METOPROLOL SUCCINATE 50 MG: 50 TABLET, EXTENDED RELEASE ORAL at 20:29

## 2023-05-28 RX ADMIN — MIDODRINE HYDROCHLORIDE 5 MG: 5 TABLET ORAL at 12:44

## 2023-05-28 RX ADMIN — INSULIN LISPRO 3 UNITS: 100 INJECTION, SOLUTION INTRAVENOUS; SUBCUTANEOUS at 20:29

## 2023-05-28 RX ADMIN — INSULIN DETEMIR 30 UNITS: 100 INJECTION, SOLUTION SUBCUTANEOUS at 20:29

## 2023-05-28 RX ADMIN — ALUMINUM HYDROXIDE, MAGNESIUM HYDROXIDE, AND DIMETHICONE 15 ML: 400; 400; 40 SUSPENSION ORAL at 16:48

## 2023-05-28 RX ADMIN — OXYCODONE HYDROCHLORIDE 10 MG: 5 TABLET ORAL at 03:02

## 2023-05-28 RX ADMIN — MIDODRINE HYDROCHLORIDE 5 MG: 5 TABLET ORAL at 16:46

## 2023-05-28 RX ADMIN — AMOXICILLIN 1000 MG: 500 CAPSULE ORAL at 21:53

## 2023-05-28 RX ADMIN — METOPROLOL SUCCINATE 50 MG: 50 TABLET, EXTENDED RELEASE ORAL at 08:32

## 2023-05-28 RX ADMIN — PREGABALIN 25 MG: 25 CAPSULE ORAL at 21:53

## 2023-05-28 RX ADMIN — INSULIN DETEMIR 30 UNITS: 100 INJECTION, SOLUTION SUBCUTANEOUS at 08:31

## 2023-05-28 RX ADMIN — APIXABAN 5 MG: 5 TABLET, FILM COATED ORAL at 08:32

## 2023-05-28 RX ADMIN — INSULIN LISPRO 2 UNITS: 100 INJECTION, SOLUTION INTRAVENOUS; SUBCUTANEOUS at 08:31

## 2023-05-28 RX ADMIN — DILTIAZEM HYDROCHLORIDE 120 MG: 120 CAPSULE, COATED, EXTENDED RELEASE ORAL at 08:32

## 2023-05-28 RX ADMIN — DOCUSATE SODIUM 50MG AND SENNOSIDES 8.6MG 2 TABLET: 8.6; 5 TABLET, FILM COATED ORAL at 20:30

## 2023-05-28 RX ADMIN — ACETAMINOPHEN 1000 MG: 325 TABLET ORAL at 21:53

## 2023-05-28 RX ADMIN — INSULIN LISPRO 2 UNITS: 100 INJECTION, SOLUTION INTRAVENOUS; SUBCUTANEOUS at 12:45

## 2023-05-28 RX ADMIN — PREGABALIN 25 MG: 25 CAPSULE ORAL at 15:02

## 2023-05-28 NOTE — PLAN OF CARE
Goal Outcome Evaluation:  Plan of Care Reviewed With: patient        Progress: no change  Outcome Evaluation: No changes pt condition this shift. Medicated for pain x1. Wound care completed. Pt encouraged to turn every 2 hours and educated on skin breakdown. Verbalizes understanding. VS WDL. Will continue to monitor.

## 2023-05-28 NOTE — PROGRESS NOTES
[facility]   Hospitalist Progress Note  Date: 2023  Patient Name: Jose Shaikh  : 1960  MRN: [id number]  Date of admission: 2023      Subjective   Subjective     Chief Complaint: Left hip pain    Summary:   Jose Shaikh is a 62 y.o. male past medical history of osteomyelitis, type 2 diabetes, hypertension, A-fib, dyslipidemia, obesity with BMI of 48, who was recently hospital for osteomyelitis and he returns due to hip pain.      Patient was recently admitted to the hospital in early April for foot infection.  There was recommendation for amputation due to osteomyelitis in the foot and the patient refused.  He was unable to be placed at that time.  He is a sex offender making it very difficult for placement although [facility] was an option.  As result, he opted to go home on oral antibiotics to treat Alcaligenes facialis and Morganella morganii with doxycycline and ciprofloxacin.  The patient was seen in wound care in middle of April and at that time Dr. Daniels attempted to switch antibiotics to Levaquin per pharmacy recommendations based off culture data but was unable to get hold the patient so he was never switched.  The patient states the only reason he came to the emergency department was because his left hip was hurting.  Unaware of fevers.  Chills.  Cough.  Shortness of breath.  The patient came to the ER for hip pain.    Patient blood culture grew Streptococcus agalactiae.  Zyvox DC'd.  Patient does not want any amputation.  Patient wound culture from right foot also grew Streptococcus.  X-ray of pelvis and hip shows DJD of bilateral hips left worse than right.  Patient refused MRI of the left hip and order was canceled as patient is very claustrophobic.  Per ID recommendation needs 6 weeks of oral amoxicillin for osteomyelitis of right foot.  Patient is doing well, he is weak and debilitated however and Social work is arranging placement in a rehab facility for  patient    Interval Followup: No acute events overnight, patient resting comfortably in bed.  Continues to have left hip pain.  No new issues.  Patient attempted to stand up with help of physical therapy at the side of the bed.  His knees started hurting bad and was not able to stand up.  Patient is going to attempt again.  Patient also wants to be eased off on fluid restriction placed in due to hyponatremia  Blood sugar up  Patient has been nonweightbearing due to feet wounds    Objective   Objective     Vitals:   Temp:  [97.5 °F (36.4 °C)-98.2 °F (36.8 °C)] 98.2 °F (36.8 °C)  Heart Rate:  [67-71] 67  Resp:  [16-18] 18  BP: (116-122)/(48-88) 119/48  Physical Exam      GEN: No acute distress, resting comfortably  HEENT: Moist mucous membranes  LUNGS: Equal chest rise bilaterally  CARDIAC: Regular rate and rhythm  NEURO: Moving all 4 extremities spontaneously     Result Review    Result Review:  I have personally reviewed the results for the past 24 hours and agree with these findings:  [x]  Laboratory  [x]  Microbiology  [x]  Radiology  [x]  EKG/Telemetry   []  Cardiology/Vascular   []  Pathology  [x]  Old records  [x]  Other: Medications    Assessment & Plan   Assessment / Plan     Assessment:  Sepsis present on admission.  Patient presented with fever, tachycardia, lactic acidosis, elevated procalcitonin and leukocytosis.  Resolved  Right upper lobe healthcare associated pneumonia .  Streptococcus agalactiae bacteremia.  Subsequent cultures negative  Chronic osteomyelitis of right foot involving cuboid and may be cuneiform.  On p.o. amoxicillin for 6 weeks  Cellulitis of right foot due to Streptococcus agalactiae.  Paroxysmal A-fib with intermittent RVR on Eliquis.  Bilateral diabetic foot ulcers.  NIDDM.  Most recent A1c 6.6 in April 2023  Morbid obesity BMI of 48.1.  S/p TMA of right foot.  Hyponatremia.  likely from hyperglycemia/volume overload.  Clinically significant.  Improved  Hypophosphatemia.   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  Chronic shoulder neck pain    Plan:  Patient refused MRI of left hip.  X-ray showed arthritis  Continue to fluid restriction for hyponatremia, decreased to 2 L/day.  This is improved  Finished 6 days of Rocephin, continue 6 weeks of oral Amoxil for osteomyelitis  Right foot is likely source of bacteremia as per ID.  Wound culture and blood culture noted.  Repeat blood cultures final results with no growth.  Appreciate podiatry input.  Per podiatry right foot wound not source of infection?  Patient does not want below-knee amputation, will start treatment with antibiotics for now  Continue midodrine, monitor blood pressure  Continue p.o. digoxin  Continue long-acting p.o. Cardizem.    Continue home metoprolol at increased dose, titrate to effect  Continue IV and oral narcotics for pain control.  Continue on Lyrica.  Continue basal bolus insulin, titrate to effect  Heating pad to neck and shoulder pain areas, pain improving  Continue Flexeril for muscle spasm, monitor for sedation  Continue wound care recommendations.    Continue PT/OT    Reviewed patients labs and imaging, and discussed with patient and nurse at bedside.    DVT prophylaxis:  Medical DVT prophylaxis orders are present.  Home Eliquis    CODE STATUS:   Level Of Support Discussed With: Patient  Code Status (Patient has no pulse and is not breathing): CPR (Attempt to Resuscitate)  Medical Interventions (Patient has pulse or is breathing): Full Support  .      Electronically signed by Shekhar Au MD, 05/28/23, 2:38 PM EDT.

## 2023-05-28 NOTE — THERAPY TREATMENT NOTE
Acute Care - Physical Therapy Treatment Note  KEO Dominguez     Patient Name: Jose Shaikh  : 1960  MRN: [id number]  Today's Date: 2023      Visit Dx:     ICD-10-CM ICD-9-CM   1. Acute febrile illness  R50.9 780.60   2. Sepsis without acute organ dysfunction, due to unspecified organism  A41.9 038.9     995.91   3. Pneumonia of right upper lobe due to infectious organism  J18.9 486   4. Uncontrolled type 2 diabetes mellitus with hyperglycemia  E11.65 250.02   5. Atrial fibrillation with rapid ventricular response  I48.91 427.31   6. Acute osteomyelitis of right foot  M86.171 730.07   7. Decreased activities of daily living (ADL)  Z78.9 V49.89   8. Difficulty in walking  R26.2 719.7     Patient Active Problem List   Diagnosis   • Diabetic ulcer of left heel associated with type 2 DM   • Acute osteomyelitis of left calcaneus    • Diabetic ulcer of left heel associated with type 2 DM   • Diabetic ulcer of right midfoot associated with type 2 DM   • Paroxysmal atrial fibrillation   • Essential hypertension   • Hyperlipidemia LDL goal <100   • Cellulitis and abscess of foot   • High alkaline phosphatase level   • Osteomyelitis   • Onychomycosis   • Onychocryptosis   • Foot pain, bilateral   • Osteomyelitis of foot, right, acute   • Cellulitis of right foot   • Type 2 diabetes mellitus, with long-term current use of insulin   • Class 3 severe obesity due to excess calories with serious comorbidity and body mass index (BMI) of 45.0 to 49.9 in adult   • Anxiety disorder, unspecified   • Claustrophobia   • Dependence on wheelchair   • Depression, unspecified   • Long term (current) use of anticoagulants   • Long term (current) use of oral hypoglycemic drugs   • Wound of foot   • Non-prs chronic ulcer oth prt r foot limited to brkdwn skin   • Orthostatic hypotension   • Other chronic osteomyelitis, right ankle and foot   • Personal history of nicotine dependence   • Thrombocytopenia, unspecified   • Unspecified  open wound, right foot, initial encounter   • Diabetic foot infection   • Subacute osteomyelitis of right foot   • Right foot pain   • Sepsis   • Onychomycosis   • Foot pain, left     Past Medical History:   Diagnosis Date   • Absence of toe of right foot    • Acute osteomyelitis of left calcaneus  8/18/2021   • Anxiety and depression    • Arthritis    • Claustrophobia    • Corns and callus    • Diabetic ulcer of left heel associated with type 2 DM 8/18/2021   • Diabetic ulcer of left heel associated with type 2 DM 7/6/2021   • Diabetic ulcer of right midfoot associated with type 2 DM 8/18/2021   • Difficulty walking    • Essential hypertension 8/31/2021   • Hammertoe    • Hyperlipidemia LDL goal <100 8/31/2021   • Ingrown toenail    • Obesity    • Paroxysmal atrial fibrillation 8/31/2021   • Polyneuropathy    • Pressure ulcer, stage 1    • Tinea unguium    • Type 2 diabetes mellitus with polyneuropathy      Past Surgical History:   Procedure Laterality Date   • CYST REMOVAL      center of back; benign   • INCISION AND DRAINAGE ABSCESS      back   • INCISION AND DRAINAGE LEG Right 12/10/2021    Procedure: INCISION AND DRAINAGE LOWER EXTREMITY;  Surgeon: Ash Leyva DPM;  Location: [facility] OR;  Service: Podiatry;  Laterality: Right;   • OTHER SURGICAL HISTORY      Surgical clips left foot   • TOE SURGERY Right     Removal of 5th toe   • TRANS METATARSAL AMPUTATION Right 12/2/2021    Procedure: AMPUTATION TRANS METATARSAL;  Surgeon: Ash Leyva DPM;  Location: [facility] OR;  Service: Podiatry;  Laterality: Right;   • WRIST SURGERY Left     repair of injury     PT Assessment (last 12 hours)     PT Evaluation and Treatment     Row Name 05/28/23 0846          Physical Therapy Time and Intention    Subjective Information complains of;weakness;fatigue;pain;dizziness  -DK     Document Type therapy note (daily note)  -DK     Mode of Treatment individual therapy;physical therapy  -DK     Patient  "Effort adequate  -DK     Symptoms Noted During/After Treatment fatigue;increased pain;dizziness  -DK     Comment Pt reports wanting to try standing / walking this session.  He was able to sit EOB and was c/o dizziness.  Bed level was raised quite a bit, and pt leaned forward to push up, but c/o severe bilateral knee pain and was unable to stand.  He was returned to bed post treatment.  Pt was advised of weight bearing restrictions on both feet, \"can\" weight bear on the left forefoot and right heel, with pt responding, \"I'm just going to stand.\".  -     Row Name 05/28/23 0846          Pain    Pretreatment Pain Rating 9/10  -DK     Posttreatment Pain Rating 9/10  -DK     Pain Location - Side/Orientation Bilateral  -DK     Pain Location generalized  -DK     Pain Location - back;hip;knee;foot  -DK     Pain Intervention(s) Repositioned;Ambulation/increased activity;Distraction;Therapeutic presence  -     Row Name 05/28/23 0846          Cognition    Affect/Mental Status (Cognition) WFL  -DK     Orientation Status (Cognition) oriented x 4  -DK     Follows Commands (Cognition) WFL  -DK     Cognitive Function WFL  -DK     Personal Safety Interventions gait belt;nonskid shoes/slippers when out of bed;supervised activity  -     Row Name 05/28/23 0846          Mobility    Extremity Weight-bearing Status left lower extremity;right lower extremity  -DK     Left Lower Extremity (Weight-bearing Status) non weight-bearing (NWB)  -DK     Right Lower Extremity (Weight-bearing Status) non weight-bearing (NWB)  -DK     Additional Documentation --  If necessary pt can weight bear on the left forefoot and the right heel.  -     Row Name 05/28/23 0846          Bed Mobility    Bed Mobility scooting/bridging;supine-sit-supine  -DK     All Activities, [place] (Bed Mobility) maximum assist (25% patient effort);2 person assist  -DK     Scooting/Bridging [place] (Bed Mobility) maximum assist (25% patient effort);2 person assist "  -DK     Supine-Sit [place] (Bed Mobility) contact guard;minimum assist (75% patient effort);1 person assist  -DK     Sit-Supine [place] (Bed Mobility) contact guard;minimum assist (75% patient effort);1 person assist  -DK     Supine-Sit-Supine [place] (Bed Mobility) contact guard;minimum assist (75% patient effort);1 person assist  -DK     Bed Mobility, Safety Issues decreased use of arms for pushing/pulling;decreased use of legs for bridging/pushing  -DK     Assistive Device (Bed Mobility) bed rails;draw sheet  -PRIYANKA     Comment, (Bed Mobility) Pt sat EOB x 3-4 minutes with supervision.  Bed level was elevated, and pt leaned forward to attempt standing, both feet were blocked and walker was placed in front of pt, but pt was unsuccessful due to c/o bilateral knee pain.  He was returned to bed on alert post treatment.  -     Row Name 05/28/23 0846          Safety Issues, Functional Mobility    Safety Issues Affecting Function (Mobility) judgment;safety precaution awareness  -     Impairments Affecting Function (Mobility) balance;endurance/activity tolerance;pain;range of motion (ROM);strength  -     Row Name 05/28/23 0846          Balance    Balance Assessment sitting static balance;sitting dynamic balance  -     Static Sitting Balance standby assist  -     Dynamic Sitting Balance standby assist  -     Position, Sitting Balance unsupported;sitting edge of bed  -     Balance Interventions sitting;static;dynamic  -     Row Name 05/28/23 0846          Motor Skills    Motor Skills --  therapeutic exercises  -     Coordination WFL  -     Therapeutic Exercise hip;knee;ankle  -     Row Name 05/28/23 0846          Hip (Therapeutic Exercise)    Hip (Therapeutic Exercise) AAROM (active assistive range of motion)  -     Hip AAROM (Therapeutic Exercise) bilateral;flexion;extension;aBduction;aDduction;supine;10 repetitions;2 sets  -     Row Name 05/28/23 0846          Knee (Therapeutic  Exercise)    Knee (Therapeutic Exercise) AAROM (active assistive range of motion)  -     Knee AAROM (Therapeutic Exercise) bilateral;flexion;extension;supine;10 repetitions;2 sets  -     Row Name 05/28/23 0846          Ankle (Therapeutic Exercise)    Ankle (Therapeutic Exercise) AROM (active range of motion)  -     Ankle AROM (Therapeutic Exercise) bilateral;dorsiflexion;plantarflexion;supine;20 repititions  -     Row Name             Wound 12/02/21 Right anterior foot Incision    Wound - Properties Group Placement Date: 12/02/21  -ES Side: Right  -ES Orientation: anterior  -ES Location: foot  -ES Primary Wound Type: Incision  -ES    Retired Wound - Properties Group Placement Date: 12/02/21  -ES Side: Right  -ES Orientation: anterior  -ES Location: foot  -ES Primary Wound Type: Incision  -ES    Retired Wound - Properties Group Date first assessed: 12/02/21  -ES Side: Right  -ES Location: foot  -ES Primary Wound Type: Incision  -ES    Row Name             Wound 06/22/21 1133 Left lateral heel    Wound - Properties Group Placement Date: 06/22/21  -FABIOLA Placement Time: 1133  -FABIOLA Present on Hospital Admission: Y  -FABIOLA Side: Left  -FABIOLA Orientation: lateral  -FABIOLA Location: heel  -FABIOLA    Retired Wound - Properties Group Placement Date: 06/22/21  -FABIOLA Placement Time: 1133  -FABIOLA Present on Hospital Admission: Y  -FABIOLA Side: Left  -FABIOLA Orientation: lateral  -FABIOLA Location: heel  -FABIOLA    Retired Wound - Properties Group Date first assessed: 06/22/21  -FABIOLA Time first assessed: 1133  -FABIOLA Present on Hospital Admission: Y  -FABIOLA Side: Left  -FABIOLA Location: heel  -FABIOLA    Row Name             Wound 05/19/23 1317 gluteal Blisters    Wound - Properties Group Placement Date: 05/19/23  -RASHARD Placement Time: 1317  -RASHARD Present on Hospital Admission: N  -RASHARD Location: gluteal  -RASHARD Primary Wound Type: Blisters  -RASHARD    Retired Wound - Properties Group Placement Date: 05/19/23  -RASHARD Placement Time: 1317  -RASHARD Present on Hospital Admission: N  -RASHARD Location:  gluteal  -RASHARD Primary Wound Type: Blisters  -RASHARD    Retired Wound - Properties Group Date first assessed: 05/19/23  -RASHARD Time first assessed: 1317  -RASHARD Present on Hospital Admission: N  -RASHARD Location: gluteal  -RASHARD Primary Wound Type: Blisters  -RASHARD    Row Name 05/28/23 0846          Plan of Care Review    Plan of Care Reviewed With patient  -DK     Progress no change  -DK     Row Name 05/28/23 0846          Positioning and Restraints    Pre-Treatment Position in bed  -DK     Post Treatment Position bed  -DK     In Bed supine;encouraged to call for assist;exit alarm on;legs elevated;heels elevated  side rails up x 4  -DK     Row Name 05/28/23 0846          Therapy Assessment/Plan (PT)    Rehab Potential (PT) fair, will monitor progress closely  -DK     Criteria for Skilled Interventions Met (PT) skilled treatment is necessary  -DK     Therapy Frequency (PT) daily  -DK     Problem List (PT) problems related to;balance;mobility;range of motion (ROM);strength;pain  -DK     Activity Limitations Related to Problem List (PT) unable to ambulate safely;unable to transfer safely  -DK     Row Name 05/28/23 0846          Progress Summary (PT)    Progress Toward Functional Goals (PT) progress toward functional goals is fair  -DK           User Key  (r) = Recorded By, (t) = Taken By, (c) = Cosigned By    Initials Name Provider Type    Karon Jordan, RN Registered Nurse    Mckenna Landaverde PTA Physical Therapist Assistant    Marlen Vaughn RN Registered Nurse    Ktalyn Garcia RN Registered Nurse                  PT Recommendation and Plan  Planned Therapy Interventions (PT): balance training, bed mobility training, gait training, home exercise program, strengthening, transfer training  Therapy Frequency (PT): daily  Progress Summary (PT)  Progress Toward Functional Goals (PT): progress toward functional goals is fair  Plan of Care Reviewed With: patient  Progress: no change   Outcome Measures     Row Name 05/28/23  0846 05/25/23 1000          How much help from another person do you currently need...    Turning from your back to your side while in flat bed without using bedrails? 2  -DK 3  -MOE     Moving from lying on back to sitting on the side of a flat bed without bedrails? 2  -DK 2  -MOE     Moving to and from a bed to a chair (including a wheelchair)? 1  -DK 1  -MOE     Standing up from a chair using your arms (e.g., wheelchair, bedside chair)? 1  -DK 1  -MOE     Climbing 3-5 steps with a railing? 1  -DK 1  -MOE     To walk in hospital room? 1  -DK 1  -MOE     AM-PAC 6 Clicks Score (PT) 8  -DK 9  -MOE        Functional Assessment    Outcome Measure Options AM-PAC 6 Clicks Basic Mobility (PT)  -DK --           User Key  (r) = Recorded By, (t) = Taken By, (c) = Cosigned By    Initials Name Provider Type    Mckenna Landaverde PTA Physical Therapist Assistant    Merlin De La O, PT Physical Therapist                 Time Calculation:    PT Charges     Row Name 05/28/23 0855             Time Calculation    PT Received On 05/28/23  -DK      PT Goal Re-Cert Due Date 06/03/23  -DK         Timed Charges    43440 - PT Therapeutic Exercise Minutes 14  -DK      67452 - PT Therapeutic Activity Minutes 14  -DK         Total Minutes    Timed Charges Total Minutes 28  -DK       Total Minutes 28  -DK            User Key  (r) = Recorded By, (t) = Taken By, (c) = Cosigned By    Initials Name Provider Type    Mckenna Landaverde PTA Physical Therapist Assistant              Therapy Charges for Today     Code Description Service Date Service Provider Modifiers Qty    33152488708 HC PT THER PROC EA 15 MIN 5/28/2023 Mckenna Wong PTA GP 1    27469574711 HC PT THERAPEUTIC ACT EA 15 MIN 5/28/2023 Mckenna Wong PTA GP 1          PT G-Codes  Outcome Measure Options: AM-PAC 6 Clicks Basic Mobility (PT)  AM-PAC 6 Clicks Score (PT): 8  AM-PAC 6 Clicks Score (OT): 14    Mckenna Wong PTA  5/28/2023

## 2023-05-29 LAB
GLUCOSE BLDC GLUCOMTR-MCNC: 154 MG/DL (ref 70–99)
GLUCOSE BLDC GLUCOMTR-MCNC: 195 MG/DL (ref 70–99)
GLUCOSE BLDC GLUCOMTR-MCNC: 252 MG/DL (ref 70–99)
GLUCOSE BLDC GLUCOMTR-MCNC: 297 MG/DL (ref 70–99)

## 2023-05-29 PROCEDURE — 94799 UNLISTED PULMONARY SVC/PX: CPT

## 2023-05-29 PROCEDURE — 63710000001 INSULIN LISPRO (HUMAN) PER 5 UNITS

## 2023-05-29 PROCEDURE — 82948 REAGENT STRIP/BLOOD GLUCOSE: CPT

## 2023-05-29 PROCEDURE — 99232 SBSQ HOSP IP/OBS MODERATE 35: CPT | Performed by: INTERNAL MEDICINE

## 2023-05-29 PROCEDURE — 63710000001 INSULIN DETEMIR PER 5 UNITS: Performed by: INTERNAL MEDICINE

## 2023-05-29 RX ORDER — CHOLECALCIFEROL (VITAMIN D3) 125 MCG
10 CAPSULE ORAL NIGHTLY PRN
Status: DISCONTINUED | OUTPATIENT
Start: 2023-05-29 | End: 2023-06-15 | Stop reason: HOSPADM

## 2023-05-29 RX ADMIN — INSULIN DETEMIR 30 UNITS: 100 INJECTION, SOLUTION SUBCUTANEOUS at 22:39

## 2023-05-29 RX ADMIN — METOPROLOL SUCCINATE 50 MG: 50 TABLET, EXTENDED RELEASE ORAL at 22:38

## 2023-05-29 RX ADMIN — INSULIN LISPRO 4 UNITS: 100 INJECTION, SOLUTION INTRAVENOUS; SUBCUTANEOUS at 22:39

## 2023-05-29 RX ADMIN — PREGABALIN 25 MG: 25 CAPSULE ORAL at 05:19

## 2023-05-29 RX ADMIN — MIDODRINE HYDROCHLORIDE 5 MG: 5 TABLET ORAL at 17:20

## 2023-05-29 RX ADMIN — OXYCODONE HYDROCHLORIDE 10 MG: 5 TABLET ORAL at 17:20

## 2023-05-29 RX ADMIN — INSULIN LISPRO 2 UNITS: 100 INJECTION, SOLUTION INTRAVENOUS; SUBCUTANEOUS at 12:46

## 2023-05-29 RX ADMIN — ACETAMINOPHEN 1000 MG: 325 TABLET ORAL at 13:41

## 2023-05-29 RX ADMIN — ACETAMINOPHEN 1000 MG: 325 TABLET ORAL at 05:19

## 2023-05-29 RX ADMIN — MIDODRINE HYDROCHLORIDE 5 MG: 5 TABLET ORAL at 06:38

## 2023-05-29 RX ADMIN — OXYCODONE HYDROCHLORIDE 10 MG: 5 TABLET ORAL at 22:37

## 2023-05-29 RX ADMIN — DIGOXIN 125 MCG: 125 TABLET ORAL at 12:46

## 2023-05-29 RX ADMIN — MIDODRINE HYDROCHLORIDE 5 MG: 5 TABLET ORAL at 13:41

## 2023-05-29 RX ADMIN — AMOXICILLIN 1000 MG: 500 CAPSULE ORAL at 15:02

## 2023-05-29 RX ADMIN — METOPROLOL SUCCINATE 50 MG: 50 TABLET, EXTENDED RELEASE ORAL at 08:50

## 2023-05-29 RX ADMIN — INSULIN LISPRO 4 UNITS: 100 INJECTION, SOLUTION INTRAVENOUS; SUBCUTANEOUS at 17:20

## 2023-05-29 RX ADMIN — AMOXICILLIN 1000 MG: 500 CAPSULE ORAL at 22:38

## 2023-05-29 RX ADMIN — ATORVASTATIN CALCIUM 10 MG: 10 TABLET, FILM COATED ORAL at 22:38

## 2023-05-29 RX ADMIN — ACETAMINOPHEN 1000 MG: 325 TABLET ORAL at 22:39

## 2023-05-29 RX ADMIN — OXYCODONE HYDROCHLORIDE 10 MG: 5 TABLET ORAL at 01:08

## 2023-05-29 RX ADMIN — APIXABAN 5 MG: 5 TABLET, FILM COATED ORAL at 22:38

## 2023-05-29 RX ADMIN — DILTIAZEM HYDROCHLORIDE 120 MG: 120 CAPSULE, COATED, EXTENDED RELEASE ORAL at 08:50

## 2023-05-29 RX ADMIN — PREGABALIN 25 MG: 25 CAPSULE ORAL at 13:41

## 2023-05-29 RX ADMIN — APIXABAN 5 MG: 5 TABLET, FILM COATED ORAL at 08:50

## 2023-05-29 RX ADMIN — PREGABALIN 25 MG: 25 CAPSULE ORAL at 22:38

## 2023-05-29 RX ADMIN — Medication 10 ML: at 22:39

## 2023-05-29 RX ADMIN — INSULIN DETEMIR 30 UNITS: 100 INJECTION, SOLUTION SUBCUTANEOUS at 08:49

## 2023-05-29 RX ADMIN — INSULIN LISPRO 2 UNITS: 100 INJECTION, SOLUTION INTRAVENOUS; SUBCUTANEOUS at 08:49

## 2023-05-29 RX ADMIN — AMOXICILLIN 1000 MG: 500 CAPSULE ORAL at 05:19

## 2023-05-29 RX ADMIN — FAMOTIDINE 40 MG: 20 TABLET ORAL at 08:49

## 2023-05-29 NOTE — PLAN OF CARE
Goal Outcome Evaluation:           Progress: no change  Outcome Evaluation: Patient a&o x4 this shift. Medicated with prn pain medication per MAR instructions. Blood glucose monitored per prders and insulin administered as ordered. Wound care completed. Patient expressed sadness due to not being able to get out of bed and ambulate; patient educated about other means of getting out of bed and about range of motion exercises while in the bed.

## 2023-05-29 NOTE — PLAN OF CARE
Goal Outcome Evaluation:  Plan of Care Reviewed With: patient        Progress: improving  Outcome Evaluation: patient is alert and oriented this shift. medicated with prn pain medication. blood glucose monitored. wound care performed per orders. patient was only able to tolerate standing at the bedside for about 5 seconds with x2 assist and walker in place. educated patient on importance of turning in the bed to prevent further skin breakdown. no other changes at this time.

## 2023-05-29 NOTE — CONSULTS
"Nutrition Services    Patient Name: Jose Shaikh  YOB: 1960  MRN: [id number]  Admission date: 5/12/2023      CLINICAL NUTRITION ASSESSMENT      Reason for Assessment  Follow-up protocol   H&P:    Past Medical History:   Diagnosis Date   • Absence of toe of right foot    • Acute osteomyelitis of left calcaneus  8/18/2021   • Anxiety and depression    • Arthritis    • Claustrophobia    • Corns and callus    • Diabetic ulcer of left heel associated with type 2 DM 8/18/2021   • Diabetic ulcer of left heel associated with type 2 DM 7/6/2021   • Diabetic ulcer of right midfoot associated with type 2 DM 8/18/2021   • Difficulty walking    • Essential hypertension 8/31/2021   • Hammertoe    • Hyperlipidemia LDL goal <100 8/31/2021   • Ingrown toenail    • Obesity    • Paroxysmal atrial fibrillation 8/31/2021   • Polyneuropathy    • Pressure ulcer, stage 1    • Tinea unguium    • Type 2 diabetes mellitus with polyneuropathy         Current Problems:   Active Hospital Problems    Diagnosis    • **Sepsis    • Onychomycosis    • Foot pain, left    • Right foot pain    • Osteomyelitis of foot, right, acute    • Wound of foot    • Diabetic ulcer of right midfoot associated with type 2 DM         Nutrition/Diet History         Narrative     Nutrition follow up. Patient is consuming 100% of meals. Weight stable. Pt is at low risk per nutrition risk screening. No acute nutrition concerns or interventions at this time. RD will continue to follow and monitor per protocol.     Anthropometrics        Current Height, Weight Height: 188 cm (74\")  Weight: (!) 167 kg (368 lb 2.7 oz)   Current BMI Body mass index is 47.27 kg/m².       Weight Hx  Wt Readings from Last 30 Encounters:   05/29/23 0500 (!) 167 kg (368 lb 2.7 oz)   05/28/23 0600 (!) 167 kg (367 lb 11.6 oz)   05/27/23 0600 (!) 167 kg (369 lb 0.8 oz)   05/26/23 0529 (!) 167 kg (369 lb 3.2 oz)   05/25/23 0600 (!) 168 kg (370 lb 3.2 oz)   05/24/23 0600 (!) 166 kg (366 " lb 9.6 oz)   05/22/23 0529 (!) 169 kg (373 lb 7.4 oz)   05/21/23 0600 (!) 169 kg (371 lb 12.8 oz)   05/20/23 0600 (!) 171 kg (376 lb 5.2 oz)   05/19/23 0300 (!) 168 kg (370 lb 14.4 oz)   05/18/23 1912 (!) 168 kg (371 lb 7.6 oz)   05/18/23 0600 (!) 169 kg (371 lb 11.1 oz)   05/16/23 0700 (!) 171 kg (377 lb 4.8 oz)   05/14/23 0500 (!) 171 kg (377 lb 3.3 oz)   05/12/23 1143 (!) 170 kg (375 lb)   05/06/23 0258 (!) 170 kg (375 lb 8 oz)   04/19/23 0909 (!) 163 kg (359 lb)   04/03/23 1906 (!) 168 kg (370 lb)   03/27/23 0938 (!) 170 kg (373 lb 10.9 oz)   03/17/23 1153 (!) 168 kg (370 lb)   01/27/23 1501 (!) 168 kg (370 lb)   12/22/22 1501 (!) 171 kg (376 lb)   11/08/22 1035 (!) 161 kg (355 lb)   10/01/22 1141 (!) 164 kg (360 lb 10.8 oz)   05/18/22 1311 (!) 155 kg (341 lb)   03/24/22 1432 (!) 155 kg (341 lb)   03/02/22 1412 (!) 155 kg (341 lb)   01/12/22 1317 (!) 155 kg (341 lb)   12/30/21 1431 (!) 155 kg (341 lb)   12/01/21 1144 (!) 155 kg (341 lb 11.4 oz)   12/01/21 0843 (!) 157 kg (346 lb)   11/22/21 0839 (!) 157 kg (346 lb)   11/15/21 1148 (!) 157 kg (346 lb 12.5 oz)   11/09/21 1139 (!) 157 kg (345 lb 7.4 oz)   11/05/21 1130 (!) 159 kg (351 lb 3.1 oz)   11/02/21 1121 (!) 161 kg (356 lb)   10/27/21 1048 (!) 161 kg (356 lb)   10/21/21 1418 (!) 162 kg (356 lb 14.8 oz)   10/20/21 1120 (!) 160 kg (353 lb)   10/12/21 1240 (!) 160 kg (353 lb 13.4 oz)   10/06/21 1035 (!) 161 kg (354 lb)   09/29/21 1339 (!) 166 kg (365 lb 15.4 oz)   09/29/21 0857 (!) 166 kg (365 lb)   09/23/21 1333 (!) 166 kg (365 lb 1.3 oz)            Wt Change Observation Weight stability with fluctuations     Estimated/Assessed Needs       Energy Requirements 25 kcal/kg adj BW (103.4 kg)   EST Needs (kcal/day) 2585 kcal       Protein Requirements 1.0-1.2 g/kg adj BW   EST Daily Needs (g/day) 103-124 g       Fluid Requirements 25 ml/kg IBW    Estimated Needs (mL/day) 2055 ml     Labs/Medications         Pertinent Labs Reviewed.   Results from last 7 days   Lab  Units 05/25/23  0506 05/24/23  0513 05/23/23  0413   SODIUM mmol/L 135* 135* 139   POTASSIUM mmol/L 4.0 4.5 3.8   CHLORIDE mmol/L 101 101 101   CO2 mmol/L 28.4 27.5 30.1*   BUN mg/dL 10 10 10   CREATININE mg/dL 0.39* 0.50* 0.59*   CALCIUM mg/dL 8.4* 8.4* 8.5*   BILIRUBIN mg/dL 0.7 0.6 0.6   ALK PHOS U/L 565* 662* 566*   ALT (SGPT) U/L 35 38 34   AST (SGOT) U/L 52* 63* 49*   GLUCOSE mg/dL 179* 252* 147*     Results from last 7 days   Lab Units 05/25/23  0506 05/24/23  0513 05/23/23 0413   MAGNESIUM mg/dL 1.7 1.6 1.6   PHOSPHORUS mg/dL 3.3 3.5 3.6   HEMOGLOBIN g/dL 11.0* 11.3* 10.3*   HEMATOCRIT % 35.8* 38.5 33.9*     COVID19   Date Value Ref Range Status   05/12/2023 Not Detected Not Detected - Ref. Range Final     Lab Results   Component Value Date    HGBA1C 6.60 (H) 04/19/2023         Pertinent Medications Reviewed.     Current Nutrition Orders & Evaluation of Intake       Oral Nutrition     Current PO Diet Diet: Cardiac Diets, Fluid Restriction (240 mL/tray) Diets, Diabetic Diets; Healthy Heart (2-3 Na+); Consistent Carbohydrate; 2000 mL/day; Texture: Regular Texture (IDDSI 7); Fluid Consistency: Thin (IDDSI 0)   Supplement No active supplement orders       Malnutrition Severity Assessment                Nutrition Diagnosis         Nutrition Dx Problem 1 No nutrition diagnosis at this time.     Nutrition Intervention         No intervention indicated.      Medical Nutrition Therapy/Nutrition Education          Learner     Readiness N/A  N/A     Method     Response N/A  N/A     Monitor/Evaluation        Monitor Per protocol.     Nutrition Discharge Plan         No nutrition needs identified at this time.     Electronically signed by:  River Clark RD  05/29/23 09:38 EDT

## 2023-05-29 NOTE — PROGRESS NOTES
[facility]   Hospitalist Progress Note  Date: 2023  Patient Name: Jose Shaikh  : 1960  MRN: [id number]  Date of admission: 2023      Subjective   Subjective     Chief Complaint: Left hip pain    Summary:   Jose Shaikh is a 62 y.o. male past medical history of osteomyelitis, type 2 diabetes, hypertension, A-fib, dyslipidemia, obesity with BMI of 48, who was recently hospital for osteomyelitis and he returns due to hip pain.      Patient was recently admitted to the hospital in early April for foot infection.  There was recommendation for amputation due to osteomyelitis in the foot and the patient refused.  He was unable to be placed at that time.  He is a sex offender making it very difficult for placement although [facility] was an option.  As result, he opted to go home on oral antibiotics to treat Alcaligenes facialis and Morganella morganii with doxycycline and ciprofloxacin.  The patient was seen in wound care in middle of April and at that time Dr. Daniels attempted to switch antibiotics to Levaquin per pharmacy recommendations based off culture data but was unable to get hold the patient so he was never switched.  The patient states the only reason he came to the emergency department was because his left hip was hurting.  Unaware of fevers.  Chills.  Cough.  Shortness of breath.  The patient came to the ER for hip pain.    Patient blood culture grew Streptococcus agalactiae.  Zyvox DC'd.  Patient does not want any amputation.  Patient wound culture from right foot also grew Streptococcus.  X-ray of pelvis and hip shows DJD of bilateral hips left worse than right.  Patient refused MRI of the left hip and order was canceled as patient is very claustrophobic.  Per ID recommendation needs 6 weeks of oral amoxicillin for osteomyelitis of right foot.  Patient is doing well, he is weak and debilitated however and Social work is arranging placement in a rehab facility for  patient    Interval Followup: No acute events overnight, patient resting comfortably in bed.  Continues to have left hip pain.  No new issues.  Patient attempted to stand up with help of physical therapy at the side of the bed.  His knees started hurting bad and was not able to stand up.  Patient is going to attempt again.  Patient also wants to be eased off on fluid restriction placed in due to hyponatremia  Blood sugar up  Patient has been nonweightbearing due to feet wounds    Objective   Objective     Vitals:   Temp:  [97.34 °F (36.3 °C)-98.6 °F (37 °C)] 97.7 °F (36.5 °C)  Heart Rate:  [66-71] 71  Resp:  [18] 18  BP: (120-143)/(53-72) 130/62  Physical Exam      GEN: No acute distress, resting comfortably  HEENT: Moist mucous membranes  LUNGS: Equal chest rise bilaterally  CARDIAC: Regular rate and rhythm  NEURO: Moving all 4 extremities spontaneously     Result Review    Result Review:  I have personally reviewed the results for the past 24 hours and agree with these findings:  [x]  Laboratory  [x]  Microbiology  [x]  Radiology  [x]  EKG/Telemetry   []  Cardiology/Vascular   []  Pathology  [x]  Old records  [x]  Other: Medications    Assessment & Plan   Assessment / Plan     Assessment:  Sepsis present on admission.  Patient presented with fever, tachycardia, lactic acidosis, elevated procalcitonin and leukocytosis.  Resolved  Right upper lobe healthcare associated pneumonia .  Streptococcus agalactiae bacteremia.  Subsequent cultures negative  Chronic osteomyelitis of right foot involving cuboid and may be cuneiform.  On p.o. amoxicillin for 6 weeks  Cellulitis of right foot due to Streptococcus agalactiae.  Paroxysmal A-fib with intermittent RVR on Eliquis.  Bilateral diabetic foot ulcers.  NIDDM.  Most recent A1c 6.6 in April 2023  Morbid obesity BMI of 48.1.  S/p TMA of right foot.  Hyponatremia.  likely from hyperglycemia/volume overload.  Clinically significant.  Improved  Hypophosphatemia.   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  Chronic shoulder neck pain    Plan:  Patient refused MRI of left hip.  X-ray showed arthritis  Continue to fluid restriction for hyponatremia, decreased to 2 L/day.  This is improved  Finished 6 days of Rocephin, continue 6 weeks of oral Amoxil for osteomyelitis  Right foot is likely source of bacteremia as per ID.  Wound culture and blood culture noted.  Repeat blood cultures final results with no growth.  Appreciate podiatry input.  Per podiatry right foot wound not source of infection?  Patient does not want below-knee amputation, will start treatment with antibiotics for now  Continue midodrine, monitor blood pressure  Continue p.o. digoxin  Continue long-acting p.o. Cardizem.    Continue home metoprolol at increased dose, titrate to effect  Continue IV and oral narcotics for pain control.  Continue on Lyrica.  Continue basal bolus insulin, titrate to effect  Heating pad to neck and shoulder pain areas, pain improving  Continue Flexeril for muscle spasm, monitor for sedation  Continue wound care recommendations.    Continue PT/OT    Reviewed patients labs and imaging, and discussed with patient and nurse at bedside.    DVT prophylaxis:  Medical DVT prophylaxis orders are present.  Home Eliquis    CODE STATUS:   Level Of Support Discussed With: Patient  Code Status (Patient has no pulse and is not breathing): CPR (Attempt to Resuscitate)  Medical Interventions (Patient has pulse or is breathing): Full Support  .        Electronically signed by Shekhar Au MD, 05/29/23, 4:25 PM EDT.

## 2023-05-30 LAB
GLUCOSE BLDC GLUCOMTR-MCNC: 185 MG/DL (ref 70–99)
GLUCOSE BLDC GLUCOMTR-MCNC: 232 MG/DL (ref 70–99)
GLUCOSE BLDC GLUCOMTR-MCNC: 233 MG/DL (ref 70–99)
GLUCOSE BLDC GLUCOMTR-MCNC: 322 MG/DL (ref 70–99)
GLUCOSE BLDC GLUCOMTR-MCNC: 324 MG/DL (ref 70–99)

## 2023-05-30 PROCEDURE — 99232 SBSQ HOSP IP/OBS MODERATE 35: CPT | Performed by: INTERNAL MEDICINE

## 2023-05-30 PROCEDURE — 82948 REAGENT STRIP/BLOOD GLUCOSE: CPT

## 2023-05-30 PROCEDURE — 63710000001 INSULIN LISPRO (HUMAN) PER 5 UNITS

## 2023-05-30 PROCEDURE — 97110 THERAPEUTIC EXERCISES: CPT

## 2023-05-30 PROCEDURE — 94799 UNLISTED PULMONARY SVC/PX: CPT

## 2023-05-30 PROCEDURE — 63710000001 INSULIN DETEMIR PER 5 UNITS: Performed by: INTERNAL MEDICINE

## 2023-05-30 RX ADMIN — OXYCODONE HYDROCHLORIDE 10 MG: 5 TABLET ORAL at 04:55

## 2023-05-30 RX ADMIN — INSULIN LISPRO 5 UNITS: 100 INJECTION, SOLUTION INTRAVENOUS; SUBCUTANEOUS at 17:59

## 2023-05-30 RX ADMIN — ACETAMINOPHEN 1000 MG: 325 TABLET ORAL at 06:19

## 2023-05-30 RX ADMIN — INSULIN DETEMIR 30 UNITS: 100 INJECTION, SOLUTION SUBCUTANEOUS at 21:33

## 2023-05-30 RX ADMIN — INSULIN DETEMIR 30 UNITS: 100 INJECTION, SOLUTION SUBCUTANEOUS at 09:16

## 2023-05-30 RX ADMIN — APIXABAN 5 MG: 5 TABLET, FILM COATED ORAL at 09:17

## 2023-05-30 RX ADMIN — OXYCODONE HYDROCHLORIDE 10 MG: 5 TABLET ORAL at 20:37

## 2023-05-30 RX ADMIN — AMOXICILLIN 1000 MG: 500 CAPSULE ORAL at 21:32

## 2023-05-30 RX ADMIN — ACETAMINOPHEN 1000 MG: 325 TABLET ORAL at 14:54

## 2023-05-30 RX ADMIN — DOCUSATE SODIUM 50MG AND SENNOSIDES 8.6MG 2 TABLET: 8.6; 5 TABLET, FILM COATED ORAL at 21:33

## 2023-05-30 RX ADMIN — FAMOTIDINE 40 MG: 20 TABLET ORAL at 09:17

## 2023-05-30 RX ADMIN — CYCLOBENZAPRINE 10 MG: 10 TABLET, FILM COATED ORAL at 04:55

## 2023-05-30 RX ADMIN — ATORVASTATIN CALCIUM 10 MG: 10 TABLET, FILM COATED ORAL at 21:33

## 2023-05-30 RX ADMIN — MIDODRINE HYDROCHLORIDE 5 MG: 5 TABLET ORAL at 17:59

## 2023-05-30 RX ADMIN — METOPROLOL SUCCINATE 50 MG: 50 TABLET, EXTENDED RELEASE ORAL at 21:32

## 2023-05-30 RX ADMIN — MIDODRINE HYDROCHLORIDE 5 MG: 5 TABLET ORAL at 06:19

## 2023-05-30 RX ADMIN — MIDODRINE HYDROCHLORIDE 5 MG: 5 TABLET ORAL at 12:22

## 2023-05-30 RX ADMIN — PREGABALIN 25 MG: 25 CAPSULE ORAL at 14:54

## 2023-05-30 RX ADMIN — AMOXICILLIN 1000 MG: 500 CAPSULE ORAL at 06:19

## 2023-05-30 RX ADMIN — PREGABALIN 25 MG: 25 CAPSULE ORAL at 06:19

## 2023-05-30 RX ADMIN — METOPROLOL SUCCINATE 50 MG: 50 TABLET, EXTENDED RELEASE ORAL at 09:17

## 2023-05-30 RX ADMIN — INSULIN LISPRO 3 UNITS: 100 INJECTION, SOLUTION INTRAVENOUS; SUBCUTANEOUS at 11:57

## 2023-05-30 RX ADMIN — INSULIN LISPRO 2 UNITS: 100 INJECTION, SOLUTION INTRAVENOUS; SUBCUTANEOUS at 09:16

## 2023-05-30 RX ADMIN — ACETAMINOPHEN 1000 MG: 325 TABLET ORAL at 21:33

## 2023-05-30 RX ADMIN — DILTIAZEM HYDROCHLORIDE 120 MG: 120 CAPSULE, COATED, EXTENDED RELEASE ORAL at 09:17

## 2023-05-30 RX ADMIN — Medication 10 MG: at 21:32

## 2023-05-30 RX ADMIN — INSULIN LISPRO 5 UNITS: 100 INJECTION, SOLUTION INTRAVENOUS; SUBCUTANEOUS at 21:33

## 2023-05-30 RX ADMIN — APIXABAN 5 MG: 5 TABLET, FILM COATED ORAL at 21:32

## 2023-05-30 RX ADMIN — DIGOXIN 125 MCG: 125 TABLET ORAL at 11:57

## 2023-05-30 RX ADMIN — AMOXICILLIN 1000 MG: 500 CAPSULE ORAL at 14:54

## 2023-05-30 RX ADMIN — PREGABALIN 25 MG: 25 CAPSULE ORAL at 21:32

## 2023-05-30 NOTE — THERAPY TREATMENT NOTE
Acute Care - Physical Therapy Treatment Note  [facility]     Patient Name: Jose Shaikh  : 1960  MRN: [id number]  Today's Date: 2023      Visit Dx:     ICD-10-CM ICD-9-CM   1. Acute febrile illness  R50.9 780.60   2. Sepsis without acute organ dysfunction, due to unspecified organism  A41.9 038.9     995.91   3. Pneumonia of right upper lobe due to infectious organism  J18.9 486   4. Uncontrolled type 2 diabetes mellitus with hyperglycemia  E11.65 250.02   5. Atrial fibrillation with rapid ventricular response  I48.91 427.31   6. Acute osteomyelitis of right foot  M86.171 730.07   7. Decreased activities of daily living (ADL)  Z78.9 V49.89   8. Difficulty in walking  R26.2 719.7     Patient Active Problem List   Diagnosis   • Diabetic ulcer of left heel associated with type 2 DM   • Acute osteomyelitis of left calcaneus    • Diabetic ulcer of left heel associated with type 2 DM   • Diabetic ulcer of right midfoot associated with type 2 DM   • Paroxysmal atrial fibrillation   • Essential hypertension   • Hyperlipidemia LDL goal <100   • Cellulitis and abscess of foot   • High alkaline phosphatase level   • Osteomyelitis   • Onychomycosis   • Onychocryptosis   • Foot pain, bilateral   • Osteomyelitis of foot, right, acute   • Cellulitis of right foot   • Type 2 diabetes mellitus, with long-term current use of insulin   • Class 3 severe obesity due to excess calories with serious comorbidity and body mass index (BMI) of 45.0 to 49.9 in adult   • Anxiety disorder, unspecified   • Claustrophobia   • Dependence on wheelchair   • Depression, unspecified   • Long term (current) use of anticoagulants   • Long term (current) use of oral hypoglycemic drugs   • Wound of foot   • Non-prs chronic ulcer oth prt r foot limited to brkdwn skin   • Orthostatic hypotension   • Other chronic osteomyelitis, right ankle and foot   • Personal history of nicotine dependence   • Thrombocytopenia, unspecified   • Unspecified  open wound, right foot, initial encounter   • Diabetic foot infection   • Subacute osteomyelitis of right foot   • Right foot pain   • Sepsis   • Onychomycosis   • Foot pain, left     Past Medical History:   Diagnosis Date   • Absence of toe of right foot    • Acute osteomyelitis of left calcaneus  8/18/2021   • Anxiety and depression    • Arthritis    • Claustrophobia    • Corns and callus    • Diabetic ulcer of left heel associated with type 2 DM 8/18/2021   • Diabetic ulcer of left heel associated with type 2 DM 7/6/2021   • Diabetic ulcer of right midfoot associated with type 2 DM 8/18/2021   • Difficulty walking    • Essential hypertension 8/31/2021   • Hammertoe    • Hyperlipidemia LDL goal <100 8/31/2021   • Ingrown toenail    • Obesity    • Paroxysmal atrial fibrillation 8/31/2021   • Polyneuropathy    • Pressure ulcer, stage 1    • Tinea unguium    • Type 2 diabetes mellitus with polyneuropathy      Past Surgical History:   Procedure Laterality Date   • CYST REMOVAL      center of back; benign   • INCISION AND DRAINAGE ABSCESS      back   • INCISION AND DRAINAGE LEG Right 12/10/2021    Procedure: INCISION AND DRAINAGE LOWER EXTREMITY;  Surgeon: Ash Leyva DPM;  Location: [facility];  Service: Podiatry;  Laterality: Right;   • OTHER SURGICAL HISTORY      Surgical clips left foot   • TOE SURGERY Right     Removal of 5th toe   • TRANS METATARSAL AMPUTATION Right 12/2/2021    Procedure: AMPUTATION TRANS METATARSAL;  Surgeon: Ash Leyva DPM;  Location: [facility] OR;  Service: Podiatry;  Laterality: Right;   • WRIST SURGERY Left     repair of injury     PT Assessment (last 12 hours)     PT Evaluation and Treatment     Row Name 05/30/23 5596          Physical Therapy Time and Intention    Subjective Information complains of;weakness;fatigue;pain  -DK     Document Type therapy note (daily note)  -DK     Mode of Treatment individual therapy;physical therapy  -DK     Patient Effort  adequate  -DK     Symptoms Noted During/After Treatment fatigue;increased pain  -DK     Comment Pt c/o increased pain levels.  Pt declined all transfers this session.  -     Row Name 05/30/23 1126          Pain    Pretreatment Pain Rating 8/10  -DK     Posttreatment Pain Rating 8/10  -DK     Pain Location - Side/Orientation Bilateral  -DK     Pain Location generalized  -DK     Pain Location - back;hip;knee;foot  -DK     Pain Intervention(s) Distraction;Therapeutic presence  -     Row Name 05/30/23 1126          Cognition    Affect/Mental Status (Cognition) WFL  -DK     Orientation Status (Cognition) oriented x 4  -DK     Follows Commands (Cognition) WFL  -DK     Cognitive Function WFL  -DK     Personal Safety Interventions gait belt;nonskid shoes/slippers when out of bed;supervised activity  -     Row Name 05/30/23 1126          Motor Skills    Motor Skills --  therapeutic exercises  -DK     Coordination WFL  -DK     Therapeutic Exercise hip;knee;ankle  -DK     Additional Documentation --  Pt declined all transfers this session.  -     Row Name 05/30/23 1126          Hip (Therapeutic Exercise)    Hip (Therapeutic Exercise) AAROM (active assistive range of motion)  -     Hip AAROM (Therapeutic Exercise) bilateral;flexion;extension;aBduction;aDduction;supine;10 repetitions;2 sets  -     Row Name 05/30/23 1126          Knee (Therapeutic Exercise)    Knee (Therapeutic Exercise) AAROM (active assistive range of motion)  -     Knee AAROM (Therapeutic Exercise) bilateral;flexion;extension;supine;10 repetitions;2 sets  -     Row Name 05/30/23 1126          Ankle (Therapeutic Exercise)    Ankle (Therapeutic Exercise) AROM (active range of motion)  -     Ankle AROM (Therapeutic Exercise) bilateral;dorsiflexion;plantarflexion;supine;20 repititions  -DK     Row Name             Wound 12/02/21 Right anterior foot Incision    Wound - Properties Group Placement Date: 12/02/21  -ES Side: Right  -ES Orientation:  anterior  -ES Location: foot  -ES Primary Wound Type: Incision  -ES    Retired Wound - Properties Group Placement Date: 12/02/21  -ES Side: Right  -ES Orientation: anterior  -ES Location: foot  -ES Primary Wound Type: Incision  -ES    Retired Wound - Properties Group Date first assessed: 12/02/21  -ES Side: Right  -ES Location: foot  -ES Primary Wound Type: Incision  -ES    Row Name             Wound 06/22/21 1133 Left lateral heel    Wound - Properties Group Placement Date: 06/22/21  -FABIOLA Placement Time: 1133  -FABIOLA Present on Hospital Admission: Y  -FABIOLA Side: Left  -FABIOLA Orientation: lateral  -FABIOLA Location: heel  -FABIOLA    Retired Wound - Properties Group Placement Date: 06/22/21  -FABIOLA Placement Time: 1133  -FABIOLA Present on Hospital Admission: Y  -FABIOLA Side: Left  -FABIOLA Orientation: lateral  -FABIOLA Location: heel  -FABIOLA    Retired Wound - Properties Group Date first assessed: 06/22/21  -FABIOLA Time first assessed: 1133  -FABIOLA Present on Hospital Admission: Y  -FABIOLA Side: Left  -FABIOLA Location: heel  -FABIOLA    Row Name             Wound 05/19/23 1317 gluteal Blisters    Wound - Properties Group Placement Date: 05/19/23  -RASHARD Placement Time: 1317  -RASHARD Present on Hospital Admission: N  -RASHARD Location: gluteal  -RASHARD Primary Wound Type: Blisters  -RASHARD    Retired Wound - Properties Group Placement Date: 05/19/23  -RASHARD Placement Time: 1317  -RASHARD Present on Hospital Admission: N  -RASHARD Location: gluteal  -RASHARD Primary Wound Type: Blisters  -RASHARD    Retired Wound - Properties Group Date first assessed: 05/19/23  -RASHARD Time first assessed: 1317  -RASHARD Present on Hospital Admission: N  -RASHARD Location: gluteal  -RASHARD Primary Wound Type: Blisters  -RASHARD    Row Name 05/30/23 1126          Plan of Care Review    Plan of Care Reviewed With patient  -DK     Progress no change  -DK     Row Name 05/30/23 1126          Positioning and Restraints    Pre-Treatment Position in bed  -DK     Post Treatment Position bed  -DK     In Bed supine;call light within reach;encouraged to call for  assist;exit alarm on;side rails up x3;legs elevated;heels elevated  -DK     Row Name 05/30/23 1126          Therapy Assessment/Plan (PT)    Rehab Potential (PT) fair, will monitor progress closely  -DK     Criteria for Skilled Interventions Met (PT) skilled treatment is necessary  -DK     Therapy Frequency (PT) daily  -DK     Problem List (PT) problems related to;balance;mobility;range of motion (ROM);strength;pain  -DK     Activity Limitations Related to Problem List (PT) unable to ambulate safely;unable to transfer safely  -DK     Row Name 05/30/23 1126          Progress Summary (PT)    Progress Toward Functional Goals (PT) progress toward functional goals is fair  -DK           User Key  (r) = Recorded By, (t) = Taken By, (c) = Cosigned By    Initials Name Provider Type    Karon Jordan, RN Registered Nurse    Mckenna Landaverde PTA Physical Therapist Assistant    Marlen Vaughn RN Registered Nurse    Katlyn Garcia RN Registered Nurse                  PT Recommendation and Plan  Planned Therapy Interventions (PT): balance training, bed mobility training, gait training, strengthening, transfer training  Therapy Frequency (PT): daily  Progress Summary (PT)  Progress Toward Functional Goals (PT): progress toward functional goals is fair  Plan of Care Reviewed With: patient  Progress: no change   Outcome Measures     Row Name 05/30/23 1126 05/28/23 0846          How much help from another person do you currently need...    Turning from your back to your side while in flat bed without using bedrails? 3  -DK 2  -DK     Moving from lying on back to sitting on the side of a flat bed without bedrails? 2  -DK 2  -DK     Moving to and from a bed to a chair (including a wheelchair)? 1  -DK 1  -DK     Standing up from a chair using your arms (e.g., wheelchair, bedside chair)? 1  -DK 1  -DK     Climbing 3-5 steps with a railing? 1  -DK 1  -DK     To walk in hospital room? 1  -DK 1  -DK     AM-PAC 6 Clicks  Score (PT) 9  -DK 8  -DK        Functional Assessment    Outcome Measure Options AM-PAC 6 Clicks Basic Mobility (PT)  -DK AM-PAC 6 Clicks Basic Mobility (PT)  -DK           User Key  (r) = Recorded By, (t) = Taken By, (c) = Cosigned By    Initials Name Provider Type    Mckenna Landaverde PTA Physical Therapist Assistant                 Time Calculation:    PT Charges     Row Name 05/30/23 1130             Time Calculation    PT Received On 05/30/23  -DK      PT Goal Re-Cert Due Date 06/03/23  -DK         Timed Charges    20168 - PT Therapeutic Exercise Minutes 14  -DK      37775 - PT Therapeutic Activity Minutes 4  -DK         Total Minutes    Timed Charges Total Minutes 18  -DK       Total Minutes 18  -DK            User Key  (r) = Recorded By, (t) = Taken By, (c) = Cosigned By    Initials Name Provider Type    Mckenna Landaverde PTA Physical Therapist Assistant              Therapy Charges for Today     Code Description Service Date Service Provider Modifiers Qty    [id number] HC PT THER PROC EA 15 MIN 5/30/2023 Mckenna Wong PTA GP 1          PT G-Codes  Outcome Measure Options: AM-PAC 6 Clicks Basic Mobility (PT)  AM-PAC 6 Clicks Score (PT): 9  AM-PAC 6 Clicks Score (OT): 14    Mckenna Wong PTA  5/30/2023

## 2023-05-30 NOTE — PROGRESS NOTES
[facility]   Hospitalist Progress Note  Date: 2023  Patient Name: Jose Shaikh  : 1960  MRN: [id number]  Date of admission: 2023      Subjective   Subjective     Chief Complaint: Left hip pain    Summary:   Jose Shaikh is a 62 y.o. male past medical history of osteomyelitis, type 2 diabetes, hypertension, A-fib, dyslipidemia, obesity with BMI of 48, who was recently hospital for osteomyelitis and he returns due to hip pain.      Patient was recently admitted to the hospital in early April for foot infection.  There was recommendation for amputation due to osteomyelitis in the foot and the patient refused.  He was unable to be placed at that time.  He is a sex offender making it very difficult for placement although [facility] was an option.  As result, he opted to go home on oral antibiotics to treat Alcaligenes facialis and Morganella morganii with doxycycline and ciprofloxacin.  The patient was seen in wound care in middle of April and at that time Dr. Daniels attempted to switch antibiotics to Levaquin per pharmacy recommendations based off culture data but was unable to get hold the patient so he was never switched.  The patient states the only reason he came to the emergency department was because his left hip was hurting.  Unaware of fevers.  Chills.  Cough.  Shortness of breath.  The patient came to the ER for hip pain.    Patient blood culture grew Streptococcus agalactiae.  Zyvox DC'd.  Patient does not want any amputation.  Patient wound culture from right foot also grew Streptococcus.  X-ray of pelvis and hip shows DJD of bilateral hips left worse than right.  Patient refused MRI of the left hip and order was canceled as patient is very claustrophobic.  Per ID recommendation needs 6 weeks of oral amoxicillin for osteomyelitis of right foot.  Patient is doing well, he is weak and debilitated however and Social work is arranging placement in a rehab facility for  patient    Interval Followup: No acute events overnight, patient resting comfortably in bed.  Continues to have left hip pain.  No new issues.  Patient attempted to stand up with help of nursing aide at the side of the bed.  His knees started hurting bad and was not able to stand up.  Stood for about 5 seconds.   Patient feels like quitting trying to stand up and let him go.  Feels down  Blood sugar better  Patient has been nonweightbearing due to feet wounds.  Has new boot for left foot    Objective   Objective     Vitals:   Temp:  [97.3 °F (36.3 °C)-97.9 °F (36.6 °C)] 97.7 °F (36.5 °C)  Heart Rate:  [] 69  Resp:  [16-20] 20  BP: (109-124)/(50-68) 124/55  Physical Exam      GEN: No acute distress, resting comfortably  HEENT: Moist mucous membranes  LUNGS: Equal chest rise bilaterally  CARDIAC: Regular rate and rhythm  NEURO: Moving all 4 extremities spontaneously     Result Review    Result Review:  I have personally reviewed the results for the past 24 hours and agree with these findings:  [x]  Laboratory  [x]  Microbiology  [x]  Radiology  [x]  EKG/Telemetry   []  Cardiology/Vascular   []  Pathology  [x]  Old records  [x]  Other: Medications    Assessment & Plan   Assessment / Plan     Assessment:  Sepsis present on admission.  Patient presented with fever, tachycardia, lactic acidosis, elevated procalcitonin and leukocytosis.  Resolved  Right upper lobe healthcare associated pneumonia .  Streptococcus agalactiae bacteremia.  Subsequent cultures negative  Chronic osteomyelitis of right foot involving cuboid and may be cuneiform.  On p.o. amoxicillin for 6 weeks  Cellulitis of right foot due to Streptococcus agalactiae.  Paroxysmal A-fib with intermittent RVR on Eliquis.  Bilateral diabetic foot ulcers.  NIDDM.  Most recent A1c 6.6 in April 2023  Morbid obesity BMI of 48.1.  S/p TMA of right foot.  Hyponatremia.  likely from hyperglycemia/volume overload.  Clinically significant.  Improved  Hypophosphatemia.   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  Chronic shoulder neck pain    Plan:  May need psych eval as patient feeling hopeless due to not able to get up.  Patient refused MRI of left hip.  X-ray showed arthritis  Continue to fluid restriction for hyponatremia, eased to 2 L/day.    Finished 6 days of Rocephin, continue 6 weeks of oral Amoxil for osteomyelitis  Right foot is likely source of bacteremia as per ID.  Wound culture and blood culture noted.  Repeat blood cultures final results with no growth.  Appreciate podiatry input.  Per podiatry right foot wound not source of infection?  Patient does not want below-knee amputation.  Continue midodrine, monitor blood pressure  Continue p.o. digoxin  Continue long-acting p.o. Cardizem.    Continue home metoprolol at increased dose, titrate to effect  Continue IV and oral narcotics for pain control.  Continue on Lyrica.  Continue basal bolus insulin, titrate to effect  Heating pad to neck and shoulder pain areas, pain improving  Continue Flexeril for muscle spasm, monitor for sedation  Continue wound care recommendations.    Continue PT/OT    Reviewed patients labs and imaging, and discussed with patient and nurse at bedside.  Patient has been declining steadily and not able to get up due to severe arthritis involving left hip and bilateral knees.  Await rehab placement.  Difficult due to his past history and inability to do much therapy    DVT prophylaxis:  Medical DVT prophylaxis orders are present.  Home Eliquis    CODE STATUS:   Level Of Support Discussed With: Patient  Code Status (Patient has no pulse and is not breathing): CPR (Attempt to Resuscitate)  Medical Interventions (Patient has pulse or is breathing): Full Support  .      Electronically signed by Shekhar Au MD, 05/30/23, 4:54 PM EDT.

## 2023-05-30 NOTE — PLAN OF CARE
Goal Outcome Evaluation:  Plan of Care Reviewed With: patient        Progress: no change  Outcome Evaluation: Pt AOX4 this shift. Pt was educated on importance of turning throughout the night. Pt. continues to be on room air. Pt medicated PRN with Oxycodone for pain. Wound care provided per order. No new issues/needs noted at this time.

## 2023-05-30 NOTE — PLAN OF CARE
Goal Outcome Evaluation:  Plan of Care Reviewed With: patient        Progress: no change  Outcome Evaluation: patient is alert and oriented this shift. no c/o pain. wound care completed per orders. blood glucose monitored. educated patient on importance of physical therapy and turning in the bed. no other changes at this time.

## 2023-05-31 LAB
GLUCOSE BLDC GLUCOMTR-MCNC: 161 MG/DL (ref 70–99)
GLUCOSE BLDC GLUCOMTR-MCNC: 249 MG/DL (ref 70–99)
GLUCOSE BLDC GLUCOMTR-MCNC: 260 MG/DL (ref 70–99)
GLUCOSE BLDC GLUCOMTR-MCNC: 281 MG/DL (ref 70–99)

## 2023-05-31 PROCEDURE — 63710000001 INSULIN DETEMIR PER 5 UNITS: Performed by: INTERNAL MEDICINE

## 2023-05-31 PROCEDURE — 99232 SBSQ HOSP IP/OBS MODERATE 35: CPT | Performed by: INTERNAL MEDICINE

## 2023-05-31 PROCEDURE — 63710000001 INSULIN LISPRO (HUMAN) PER 5 UNITS

## 2023-05-31 PROCEDURE — 97530 THERAPEUTIC ACTIVITIES: CPT

## 2023-05-31 PROCEDURE — 94799 UNLISTED PULMONARY SVC/PX: CPT

## 2023-05-31 PROCEDURE — 82948 REAGENT STRIP/BLOOD GLUCOSE: CPT

## 2023-05-31 PROCEDURE — 97110 THERAPEUTIC EXERCISES: CPT

## 2023-05-31 RX ADMIN — CYCLOBENZAPRINE 10 MG: 10 TABLET, FILM COATED ORAL at 05:12

## 2023-05-31 RX ADMIN — OXYCODONE HYDROCHLORIDE 10 MG: 5 TABLET ORAL at 21:17

## 2023-05-31 RX ADMIN — INSULIN DETEMIR 30 UNITS: 100 INJECTION, SOLUTION SUBCUTANEOUS at 08:28

## 2023-05-31 RX ADMIN — FAMOTIDINE 40 MG: 20 TABLET ORAL at 08:28

## 2023-05-31 RX ADMIN — MIDODRINE HYDROCHLORIDE 5 MG: 5 TABLET ORAL at 11:54

## 2023-05-31 RX ADMIN — OXYCODONE HYDROCHLORIDE 10 MG: 5 TABLET ORAL at 09:22

## 2023-05-31 RX ADMIN — OXYCODONE HYDROCHLORIDE 10 MG: 5 TABLET ORAL at 03:22

## 2023-05-31 RX ADMIN — ACETAMINOPHEN 1000 MG: 325 TABLET ORAL at 05:12

## 2023-05-31 RX ADMIN — INSULIN DETEMIR 35 UNITS: 100 INJECTION, SOLUTION SUBCUTANEOUS at 21:17

## 2023-05-31 RX ADMIN — ATORVASTATIN CALCIUM 10 MG: 10 TABLET, FILM COATED ORAL at 21:17

## 2023-05-31 RX ADMIN — INSULIN LISPRO 3 UNITS: 100 INJECTION, SOLUTION INTRAVENOUS; SUBCUTANEOUS at 12:54

## 2023-05-31 RX ADMIN — ACETAMINOPHEN 1000 MG: 325 TABLET ORAL at 13:48

## 2023-05-31 RX ADMIN — MIDODRINE HYDROCHLORIDE 5 MG: 5 TABLET ORAL at 18:06

## 2023-05-31 RX ADMIN — ACETAMINOPHEN 1000 MG: 325 TABLET ORAL at 21:17

## 2023-05-31 RX ADMIN — INSULIN LISPRO 2 UNITS: 100 INJECTION, SOLUTION INTRAVENOUS; SUBCUTANEOUS at 09:23

## 2023-05-31 RX ADMIN — MIDODRINE HYDROCHLORIDE 5 MG: 5 TABLET ORAL at 09:22

## 2023-05-31 RX ADMIN — PREGABALIN 25 MG: 25 CAPSULE ORAL at 21:17

## 2023-05-31 RX ADMIN — AMOXICILLIN 1000 MG: 500 CAPSULE ORAL at 05:12

## 2023-05-31 RX ADMIN — INSULIN LISPRO 4 UNITS: 100 INJECTION, SOLUTION INTRAVENOUS; SUBCUTANEOUS at 18:06

## 2023-05-31 RX ADMIN — INSULIN LISPRO 4 UNITS: 100 INJECTION, SOLUTION INTRAVENOUS; SUBCUTANEOUS at 21:17

## 2023-05-31 RX ADMIN — DILTIAZEM HYDROCHLORIDE 120 MG: 120 CAPSULE, COATED, EXTENDED RELEASE ORAL at 08:28

## 2023-05-31 RX ADMIN — AMOXICILLIN 1000 MG: 500 CAPSULE ORAL at 21:17

## 2023-05-31 RX ADMIN — AMOXICILLIN 1000 MG: 500 CAPSULE ORAL at 13:48

## 2023-05-31 RX ADMIN — PREGABALIN 25 MG: 25 CAPSULE ORAL at 13:48

## 2023-05-31 RX ADMIN — METOPROLOL SUCCINATE 50 MG: 50 TABLET, EXTENDED RELEASE ORAL at 08:28

## 2023-05-31 RX ADMIN — APIXABAN 5 MG: 5 TABLET, FILM COATED ORAL at 21:17

## 2023-05-31 RX ADMIN — PREGABALIN 25 MG: 25 CAPSULE ORAL at 05:12

## 2023-05-31 RX ADMIN — APIXABAN 5 MG: 5 TABLET, FILM COATED ORAL at 08:28

## 2023-05-31 RX ADMIN — DIGOXIN 125 MCG: 125 TABLET ORAL at 11:54

## 2023-05-31 NOTE — THERAPY TREATMENT NOTE
Acute Care - Physical Therapy Treatment Note  KEO Dominguez     Patient Name: Jose Shaikh  : 1960  MRN: [id number]  Today's Date: 2023      Visit Dx:     ICD-10-CM ICD-9-CM   1. Acute febrile illness  R50.9 780.60   2. Sepsis without acute organ dysfunction, due to unspecified organism  A41.9 038.9     995.91   3. Pneumonia of right upper lobe due to infectious organism  J18.9 486   4. Uncontrolled type 2 diabetes mellitus with hyperglycemia  E11.65 250.02   5. Atrial fibrillation with rapid ventricular response  I48.91 427.31   6. Acute osteomyelitis of right foot  M86.171 730.07   7. Decreased activities of daily living (ADL)  Z78.9 V49.89   8. Difficulty in walking  R26.2 719.7     Patient Active Problem List   Diagnosis   • Diabetic ulcer of left heel associated with type 2 DM   • Acute osteomyelitis of left calcaneus    • Diabetic ulcer of left heel associated with type 2 DM   • Diabetic ulcer of right midfoot associated with type 2 DM   • Paroxysmal atrial fibrillation   • Essential hypertension   • Hyperlipidemia LDL goal <100   • Cellulitis and abscess of foot   • High alkaline phosphatase level   • Osteomyelitis   • Onychomycosis   • Onychocryptosis   • Foot pain, bilateral   • Osteomyelitis of foot, right, acute   • Cellulitis of right foot   • Type 2 diabetes mellitus, with long-term current use of insulin   • Class 3 severe obesity due to excess calories with serious comorbidity and body mass index (BMI) of 45.0 to 49.9 in adult   • Anxiety disorder, unspecified   • Claustrophobia   • Dependence on wheelchair   • Depression, unspecified   • Long term (current) use of anticoagulants   • Long term (current) use of oral hypoglycemic drugs   • Wound of foot   • Non-prs chronic ulcer oth prt r foot limited to brkdwn skin   • Orthostatic hypotension   • Other chronic osteomyelitis, right ankle and foot   • Personal history of nicotine dependence   • Thrombocytopenia, unspecified   • Unspecified  open wound, right foot, initial encounter   • Diabetic foot infection   • Subacute osteomyelitis of right foot   • Right foot pain   • Sepsis   • Onychomycosis   • Foot pain, left     Past Medical History:   Diagnosis Date   • Absence of toe of right foot    • Acute osteomyelitis of left calcaneus  8/18/2021   • Anxiety and depression    • Arthritis    • Claustrophobia    • Corns and callus    • Diabetic ulcer of left heel associated with type 2 DM 8/18/2021   • Diabetic ulcer of left heel associated with type 2 DM 7/6/2021   • Diabetic ulcer of right midfoot associated with type 2 DM 8/18/2021   • Difficulty walking    • Essential hypertension 8/31/2021   • Hammertoe    • Hyperlipidemia LDL goal <100 8/31/2021   • Ingrown toenail    • Obesity    • Paroxysmal atrial fibrillation 8/31/2021   • Polyneuropathy    • Pressure ulcer, stage 1    • Tinea unguium    • Type 2 diabetes mellitus with polyneuropathy      Past Surgical History:   Procedure Laterality Date   • CYST REMOVAL      center of back; benign   • INCISION AND DRAINAGE ABSCESS      back   • INCISION AND DRAINAGE LEG Right 12/10/2021    Procedure: INCISION AND DRAINAGE LOWER EXTREMITY;  Surgeon: Ash Leyva DPM;  Location: [facility];  Service: Podiatry;  Laterality: Right;   • OTHER SURGICAL HISTORY      Surgical clips left foot   • TOE SURGERY Right     Removal of 5th toe   • TRANS METATARSAL AMPUTATION Right 12/2/2021    Procedure: AMPUTATION TRANS METATARSAL;  Surgeon: Ash Leyva DPM;  Location: [facility] OR;  Service: Podiatry;  Laterality: Right;   • WRIST SURGERY Left     repair of injury     PT Assessment (last 12 hours)     PT Evaluation and Treatment     Row Name 05/31/23 0844          Physical Therapy Time and Intention    Subjective Information complains of;weakness;fatigue;pain  -DK     Document Type therapy note (daily note)  -DK     Mode of Treatment individual therapy;physical therapy  -DK     Patient Effort  good  -DK     Symptoms Noted During/After Treatment increased pain;fatigue  -DK     Comment Pt was rather forceful with requests to stand/raise bed level, bed mobs.  Pt states he wants to just get up and walk around the room, but is unable to stand beyond 7 seconds currently.  -DK     Row Name 05/31/23 0844          Pain    Pretreatment Pain Rating 7/10  -DK     Posttreatment Pain Rating 7/10  -DK     Pain Location - Side/Orientation Bilateral  -DK     Pain Location generalized  -DK     Pain Location - back;hip;knee;foot  -DK     Pain Intervention(s) Repositioned;Ambulation/increased activity;Distraction;Therapeutic presence  -DK     Row Name 05/31/23 0844          Cognition    Affect/Mental Status (Cognition) WFL  -DK     Orientation Status (Cognition) oriented x 4  -DK     Follows Commands (Cognition) WFL  -DK     Cognitive Function WFL  -DK     Personal Safety Interventions gait belt;nonskid shoes/slippers when out of bed;supervised activity  -     Row Name 05/31/23 0844          Mobility    Extremity Weight-bearing Status left lower extremity;right lower extremity  -DK     Left Lower Extremity (Weight-bearing Status) non weight-bearing (NWB)  -DK     Right Lower Extremity (Weight-bearing Status) non weight-bearing (NWB)  -DK     Additional Documentation --  If necessary, pt can weight bear on the right heel, left forefoot.  -     Row Name 05/31/23 0844          Bed Mobility    Bed Mobility scooting/bridging;supine-sit-supine  -DK     All Activities, [place] (Bed Mobility) maximum assist (25% patient effort);2 person assist;moderate assist (50% patient effort)  -DK     Scooting/Bridging [place] (Bed Mobility) maximum assist (25% patient effort);2 person assist  -DK     Supine-Sit [place] (Bed Mobility) contact guard;1 person assist;standby assist  -DK     Sit-Supine [place] (Bed Mobility) contact guard;1 person assist;minimum assist (75% patient effort)  -DK     Supine-Sit-Supine  [place] (Bed Mobility) contact guard;1 person assist;standby assist  -DK     Bed Mobility, Safety Issues decreased use of arms for pushing/pulling;decreased use of legs for bridging/pushing  -     Assistive Device (Bed Mobility) bed rails;draw sheet  -     Row Name 05/31/23 0844          Transfers    Transfers sit-stand transfer;stand-sit transfer  -DK     Maintains Weight-bearing Status (Transfers) unable to maintain weight-bearing status  -     Comment, (Transfers) Pt attempts to decline assistance with standing transfers.  He was able to stand erect x 7 seconds with a rolling walker, on room air, with SUHA x 2, but was unable to take steps.  He returned to bed on alert post treatment.  -     Row Name 05/31/23 0844          Sit-Stand Transfer    Sit-Stand [place] (Transfers) contact guard;minimum assist (75% patient effort);2 person assist  -DK     Assistive Device (Sit-Stand Transfers) walker, front-wheeled  -     Row Name 05/31/23 0844          Stand-Sit Transfer    Stand-Sit [place] (Transfers) contact guard;minimum assist (75% patient effort);2 person assist  -     Row Name 05/31/23 0844          Safety Issues, Functional Mobility    Safety Issues Affecting Function (Mobility) awareness of need for assistance;judgment;safety precaution awareness  -     Impairments Affecting Function (Mobility) balance;endurance/activity tolerance;pain;range of motion (ROM);strength  -     Row Name 05/31/23 0844          Balance    Balance Assessment sitting static balance;sitting dynamic balance;standing static balance  -     Static Sitting Balance standby assist  -     Dynamic Sitting Balance standby assist  -DK     Position, Sitting Balance unsupported;sitting edge of bed  -DK     Static Standing Balance contact guard;2-person assist;minimal assist  -DK     Position/Device Used, Standing Balance walker, front-wheeled  -     Balance Interventions standing;supported;static;dynamic;tandem  standing  -     Row Name 05/31/23 0844          Motor Skills    Motor Skills --  therapeutic exercises  -     Coordination WFL  -     Therapeutic Exercise hip;knee;ankle  -     Row Name 05/31/23 0844          Hip (Therapeutic Exercise)    Hip (Therapeutic Exercise) AAROM (active assistive range of motion)  -     Hip AAROM (Therapeutic Exercise) bilateral;flexion;extension;aBduction;aDduction;supine;10 repetitions;2 sets  -     Row Name 05/31/23 0844          Knee (Therapeutic Exercise)    Knee (Therapeutic Exercise) AAROM (active assistive range of motion)  -     Knee AAROM (Therapeutic Exercise) bilateral;flexion;extension;supine;10 repetitions;2 sets  -     Row Name 05/31/23 0844          Ankle (Therapeutic Exercise)    Ankle (Therapeutic Exercise) AROM (active range of motion)  -     Ankle AROM (Therapeutic Exercise) bilateral;dorsiflexion;plantarflexion;supine;20 repititions  -     Row Name             Wound 12/02/21 Right anterior foot Incision    Wound - Properties Group Placement Date: 12/02/21  -ES Side: Right  -ES Orientation: anterior  -ES Location: foot  -ES Primary Wound Type: Incision  -ES    Retired Wound - Properties Group Placement Date: 12/02/21  -ES Side: Right  -ES Orientation: anterior  -ES Location: foot  -ES Primary Wound Type: Incision  -ES    Retired Wound - Properties Group Date first assessed: 12/02/21  -ES Side: Right  -ES Location: foot  -ES Primary Wound Type: Incision  -ES    Row Name             Wound 06/22/21 1133 Left lateral heel    Wound - Properties Group Placement Date: 06/22/21  -FABIOLA Placement Time: 1133  -FABIOLA Present on Hospital Admission: Y  -FABIOLA Side: Left  -FABIOLA Orientation: lateral  -FABIOLA Location: heel  -FABIOLA    Retired Wound - Properties Group Placement Date: 06/22/21  -FABIOLA Placement Time: 1133  -FABIOLA Present on Hospital Admission: Y  -FABIOLA Side: Left  -FABIOLA Orientation: lateral  -FABIOLA Location: heel  -FABIOLA    Retired Wound - Properties Group Date first assessed: 06/22/21   -FABIOLA Time first assessed: 1133  -FABIOLA Present on Hospital Admission: Y  -FABIOLA Side: Left  -FABIOLA Location: heel  -FABIOLA    Row Name             Wound 05/19/23 1317 gluteal Blisters    Wound - Properties Group Placement Date: 05/19/23  -RASHARD Placement Time: 1317  -RASHARD Present on Hospital Admission: N  -RASHARD Location: gluteal  -RASHARD Primary Wound Type: Blisters  -RASHARD    Retired Wound - Properties Group Placement Date: 05/19/23  -RASHARD Placement Time: 1317  -RASHARD Present on Hospital Admission: N  -RASHARD Location: gluteal  -RASHARD Primary Wound Type: Blisters  -RASHARD    Retired Wound - Properties Group Date first assessed: 05/19/23  -RASHARD Time first assessed: 1317  -RASHARD Present on Hospital Admission: N  -RASHARD Location: gluteal  -RASHARD Primary Wound Type: Blisters  -RASHARD    Row Name 05/31/23 0844          Plan of Care Review    Plan of Care Reviewed With patient  -DK     Progress improving  -DK     Row Name 05/31/23 0844          Positioning and Restraints    Pre-Treatment Position in bed  -DK     Post Treatment Position bed  -DK     In Bed supine;call light within reach;encouraged to call for assist;exit alarm on;side rails up x3;legs elevated;heels elevated  -DK     Row Name 05/31/23 0844          Therapy Assessment/Plan (PT)    Rehab Potential (PT) fair, will monitor progress closely  -DK     Criteria for Skilled Interventions Met (PT) skilled treatment is necessary  -DK     Therapy Frequency (PT) daily  -DK     Problem List (PT) problems related to;balance;mobility;range of motion (ROM);strength;pain  -DK     Activity Limitations Related to Problem List (PT) unable to ambulate safely;unable to transfer safely  -DK     Row Name 05/31/23 0844          Progress Summary (PT)    Progress Toward Functional Goals (PT) progress toward functional goals is fair  -           User Key  (r) = Recorded By, (t) = Taken By, (c) = Cosigned By    Initials Name Provider Type    Karon Jordan RN Registered Nurse    Mckenna Landaverde PTA Physical Therapist Assistant     Marlen Vaughn, RN Registered Nurse    Katlyn Garcia RN Registered Nurse                  PT Recommendation and Plan  Planned Therapy Interventions (PT): balance training, bed mobility training, gait training, home exercise program, strengthening, transfer training  Therapy Frequency (PT): daily  Progress Summary (PT)  Progress Toward Functional Goals (PT): progress toward functional goals is fair  Plan of Care Reviewed With: patient  Progress: improving   Outcome Measures     Row Name 05/31/23 0844 05/30/23 1126          How much help from another person do you currently need...    Turning from your back to your side while in flat bed without using bedrails? 4  -DK 3  -DK     Moving from lying on back to sitting on the side of a flat bed without bedrails? 3  -DK 2  -DK     Moving to and from a bed to a chair (including a wheelchair)? 1  -DK 1  -DK     Standing up from a chair using your arms (e.g., wheelchair, bedside chair)? 2  -DK 1  -DK     Climbing 3-5 steps with a railing? 1  -DK 1  -DK     To walk in hospital room? 1  -DK 1  -DK     AM-PAC 6 Clicks Score (PT) 12  -DK 9  -DK        Functional Assessment    Outcome Measure Options AM-PAC 6 Clicks Basic Mobility (PT)  -DK AM-PAC 6 Clicks Basic Mobility (PT)  -DK           User Key  (r) = Recorded By, (t) = Taken By, (c) = Cosigned By    Initials Name Provider Type    Mckenna Landaverde PTA Physical Therapist Assistant                 Time Calculation:    PT Charges     Row Name 05/31/23 0851             Time Calculation    PT Received On 05/31/23  -DK      PT Goal Re-Cert Due Date 06/03/23  -DK         Timed Charges    63845 - PT Therapeutic Exercise Minutes 14  -DK      82707 - Gait Training Minutes  1  -DK      58664 - PT Therapeutic Activity Minutes 12  -DK         Total Minutes    Timed Charges Total Minutes 27  -DK       Total Minutes 27  -DK            User Key  (r) = Recorded By, (t) = Taken By, (c) = Cosigned By    Initials Name Provider Type     Mckenna Landaverde PTA Physical Therapist Assistant              Therapy Charges for Today     Code Description Service Date Service Provider Modifiers Qty    40547304146 HC PT THER PROC EA 15 MIN 5/30/2023 Mckenna Wong, CURTIS GP 1    87582502161 HC PT THER PROC EA 15 MIN 5/31/2023 Mckenna Wong, CURTIS GP 1    45319010545 HC PT THERAPEUTIC ACT EA 15 MIN 5/31/2023 Mckenna Wong, CURTIS GP 1          PT G-Codes  Outcome Measure Options: AM-PAC 6 Clicks Basic Mobility (PT)  AM-PAC 6 Clicks Score (PT): 12  AM-PAC 6 Clicks Score (OT): 14    Mckenna Wong PTA  5/31/2023

## 2023-05-31 NOTE — TREATMENT PLAN
Right buttocks, no longer fluid filled, dried, flat, non-tender. Barrier cream applied after hygiene performed. Air mattress in use         Red moist smooth granular base noted, callus border, noted, betadine applied and dry dressing and ace per orders, foot washed . Floated .  2.6CMLx2.8cmWx0.3cmD       Left heel noted with 0.7CM undermining along 7oclock location    Red moist base noted, small linear area of white tissue present, thick border noted.   Betadine , dry dressing, ace, floated whole foot washed.   2.4cmLx1.6cmWx0.5cmD          patient reports is current with outpatient wound care clinic,  encouraged to continue scheduled appointments.

## 2023-05-31 NOTE — PROGRESS NOTES
[facility]   Hospitalist Progress Note  Date: 2023  Patient Name: Jose Shaikh  : 1960  MRN: [id number]  Date of admission: 2023      Subjective   Subjective     Chief Complaint: Left hip pain    Summary:   Jose Shaikh is a 62 y.o. male past medical history of osteomyelitis, type 2 diabetes, hypertension, A-fib, dyslipidemia, obesity with BMI of 48, who was recently hospital for osteomyelitis and he returns due to hip pain.      Patient was recently admitted to the hospital in early April for foot infection.  There was recommendation for amputation due to osteomyelitis in the foot and the patient refused.  He was unable to be placed at that time.  He is a sex offender making it very difficult for placement although [facility] was an option.  As result, he opted to go home on oral antibiotics to treat Alcaligenes facialis and Morganella morganii with doxycycline and ciprofloxacin.  The patient was seen in wound care in middle of April and at that time Dr. Daniels attempted to switch antibiotics to Levaquin per pharmacy recommendations based off culture data but was unable to get hold the patient so he was never switched.  The patient states the only reason he came to the emergency department was because his left hip was hurting.  Unaware of fevers.  Chills.  Cough.  Shortness of breath.  The patient came to the ER for hip pain.    Patient blood culture grew Streptococcus agalactiae.  Zyvox DC'd.  Patient does not want any amputation.  Patient wound culture from right foot also grew Streptococcus.  X-ray of pelvis and hip shows DJD of bilateral hips left worse than right.  Patient refused MRI of the left hip and order was canceled as patient is very claustrophobic.  Per ID recommendation needs 6 weeks of oral amoxicillin for osteomyelitis of right foot.  Patient is doing well, he is weak and debilitated however and Social work is arranging placement in a rehab facility for  patient    Interval Followup: Resting. Took some steps with PT today. Eating ok. No fevers    Objective   Objective     Vitals:   Temp:  [97.3 °F (36.3 °C)-97.9 °F (36.6 °C)] 97.5 °F (36.4 °C)  Heart Rate:  [66-71] 66  Resp:  [16-20] 16  BP: (105-124)/(48-63) 107/52  Physical Exam      GEN: No acute distress, resting comfortably  HEENT: Moist mucous membranes  LUNGS: Equal chest rise bilaterally  CARDIAC: Regular rate and rhythm  NEURO: Moving all 4 extremities spontaneously     Result Review    Result Review:  I have personally reviewed the results for the past 24 hours and agree with these findings:  [x]  Laboratory  [x]  Microbiology  [x]  Radiology  [x]  EKG/Telemetry   []  Cardiology/Vascular   []  Pathology  [x]  Old records  [x]  Other: Medications    Assessment & Plan   Assessment / Plan     Assessment:  Sepsis present on admission.  Patient presented with fever, tachycardia, lactic acidosis, elevated procalcitonin and leukocytosis.  Resolved  Right upper lobe healthcare associated pneumonia .  Streptococcus agalactiae bacteremia.  Subsequent cultures negative  Chronic osteomyelitis of right foot involving cuboid and may be cuneiform.  On p.o. amoxicillin for 6 weeks  Cellulitis of right foot due to Streptococcus agalactiae.  Paroxysmal A-fib with intermittent RVR on Eliquis.  Bilateral diabetic foot ulcers.  NIDDM.  Most recent A1c 6.6 in April 2023  Morbid obesity BMI of 48.1.  S/p TMA of right foot.  Hyponatremia.  likely from hyperglycemia/volume overload.  Clinically significant.  Improved  Hypophosphatemia.  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  Chronic shoulder neck pain    Plan:  May need psych eval as patient feeling hopeless due to not able to get up.  Patient refused MRI of left hip.  X-ray showed arthritis  Continue to fluid restriction for hyponatremia, eased to 2 L/day.    Recheck BMP in am  Right foot is likely source of bacteremia as per ID.  Wound culture and blood  culture noted.  Repeat blood cultures final results with no growth.  CTX --> Amoxil with plans for 6 weeks of therapy for presumed OM from 5/13. Last dose 6/23  Appreciate podiatry input.  Per podiatry right foot wound not source of infection?  Patient does not want below-knee amputation.  Continue midodrine, monitor blood pressure  Continue p.o. digoxin  Continue long-acting p.o. Cardizem.    Continue home metoprolol at increased dose, titrate to effect  Continue IV and oral narcotics for pain control.  Continue on Lyrica.  Continue basal bolus insulin, titrate to effect. Inc levemir today  Heating pad to neck and shoulder pain areas, pain improving  Continue Flexeril for muscle spasm, monitor for sedation  Continue wound care recommendations.    Continue PT/OT    Reviewed patients labs and imaging, and discussed with patient and nurse at bedside.  Patient has been declining steadily and not able to get up due to severe arthritis involving left hip and bilateral knees.  Await rehab placement.  Difficult due to his past history and inability to do much therapy    DVT prophylaxis:  Medical DVT prophylaxis orders are present.  Home Eliquis    CODE STATUS:   Level Of Support Discussed With: Patient  Code Status (Patient has no pulse and is not breathing): CPR (Attempt to Resuscitate)  Medical Interventions (Patient has pulse or is breathing): Full Support  .  Electronically signed by Ashvin Tyler MD, 05/31/23, 12:15 PM EDT.

## 2023-05-31 NOTE — PLAN OF CARE
Goal Outcome Evaluation:  Plan of Care Reviewed With: patient        Progress: no change  Outcome Evaluation: Pt remained A&Ox4 this shift. Also, pt remained on room air maintaining stable oxygen saturation. Pt was medicated with PRN Oxycodone to help achieve an accpetable pain tolerance level. Pt refused turns and wound care this shift. Education provided on skin breakdown and need for wound care. Blood glucose readinings were 161, 249, and 260. Sliding scale insulin administered, as per order, see MAR. Pt worked with PT this shift and stood with x2 assist and walker. All other vital signs remained stable.

## 2023-06-01 LAB
ANION GAP SERPL CALCULATED.3IONS-SCNC: 6.2 MMOL/L (ref 5–15)
BUN SERPL-MCNC: 9 MG/DL (ref 8–23)
BUN/CREAT SERPL: 17 (ref 7–25)
CALCIUM SPEC-SCNC: 8.4 MG/DL (ref 8.6–10.5)
CHLORIDE SERPL-SCNC: 100 MMOL/L (ref 98–107)
CO2 SERPL-SCNC: 29.8 MMOL/L (ref 22–29)
CREAT SERPL-MCNC: 0.53 MG/DL (ref 0.76–1.27)
DEPRECATED RDW RBC AUTO: 48.7 FL (ref 37–54)
EGFRCR SERPLBLD CKD-EPI 2021: 111.9 ML/MIN/1.73
ERYTHROCYTE [DISTWIDTH] IN BLOOD BY AUTOMATED COUNT: 16.9 % (ref 12.3–15.4)
GLUCOSE BLDC GLUCOMTR-MCNC: 186 MG/DL (ref 70–99)
GLUCOSE BLDC GLUCOMTR-MCNC: 197 MG/DL (ref 70–99)
GLUCOSE BLDC GLUCOMTR-MCNC: 242 MG/DL (ref 70–99)
GLUCOSE BLDC GLUCOMTR-MCNC: 257 MG/DL (ref 70–99)
GLUCOSE SERPL-MCNC: 203 MG/DL (ref 65–99)
HCT VFR BLD AUTO: 35 % (ref 37.5–51)
HGB BLD-MCNC: 10.6 G/DL (ref 13–17.7)
MCH RBC QN AUTO: 24.3 PG (ref 26.6–33)
MCHC RBC AUTO-ENTMCNC: 30.3 G/DL (ref 31.5–35.7)
MCV RBC AUTO: 80.1 FL (ref 79–97)
PLATELET # BLD AUTO: 153 10*3/MM3 (ref 140–450)
PMV BLD AUTO: 11 FL (ref 6–12)
POTASSIUM SERPL-SCNC: 4.5 MMOL/L (ref 3.5–5.2)
RBC # BLD AUTO: 4.37 10*6/MM3 (ref 4.14–5.8)
SODIUM SERPL-SCNC: 136 MMOL/L (ref 136–145)
WBC NRBC COR # BLD: 4.2 10*3/MM3 (ref 3.4–10.8)

## 2023-06-01 PROCEDURE — 63710000001 INSULIN LISPRO (HUMAN) PER 5 UNITS

## 2023-06-01 PROCEDURE — 82948 REAGENT STRIP/BLOOD GLUCOSE: CPT

## 2023-06-01 PROCEDURE — 97110 THERAPEUTIC EXERCISES: CPT

## 2023-06-01 PROCEDURE — 63710000001 INSULIN DETEMIR PER 5 UNITS: Performed by: INTERNAL MEDICINE

## 2023-06-01 PROCEDURE — 80048 BASIC METABOLIC PNL TOTAL CA: CPT | Performed by: INTERNAL MEDICINE

## 2023-06-01 PROCEDURE — 94799 UNLISTED PULMONARY SVC/PX: CPT

## 2023-06-01 PROCEDURE — 85027 COMPLETE CBC AUTOMATED: CPT | Performed by: INTERNAL MEDICINE

## 2023-06-01 PROCEDURE — 97530 THERAPEUTIC ACTIVITIES: CPT

## 2023-06-01 PROCEDURE — 63710000001 INSULIN LISPRO (HUMAN) PER 5 UNITS: Performed by: INTERNAL MEDICINE

## 2023-06-01 PROCEDURE — 99232 SBSQ HOSP IP/OBS MODERATE 35: CPT | Performed by: INTERNAL MEDICINE

## 2023-06-01 RX ORDER — INSULIN LISPRO 100 [IU]/ML
5 INJECTION, SOLUTION INTRAVENOUS; SUBCUTANEOUS
Status: DISCONTINUED | OUTPATIENT
Start: 2023-06-01 | End: 2023-06-01

## 2023-06-01 RX ORDER — INSULIN LISPRO 100 [IU]/ML
8 INJECTION, SOLUTION INTRAVENOUS; SUBCUTANEOUS
Status: DISCONTINUED | OUTPATIENT
Start: 2023-06-01 | End: 2023-06-08

## 2023-06-01 RX ADMIN — AMOXICILLIN 1000 MG: 500 CAPSULE ORAL at 21:23

## 2023-06-01 RX ADMIN — PREGABALIN 25 MG: 25 CAPSULE ORAL at 21:23

## 2023-06-01 RX ADMIN — INSULIN LISPRO 4 UNITS: 100 INJECTION, SOLUTION INTRAVENOUS; SUBCUTANEOUS at 21:24

## 2023-06-01 RX ADMIN — PREGABALIN 25 MG: 25 CAPSULE ORAL at 05:03

## 2023-06-01 RX ADMIN — INSULIN LISPRO 3 UNITS: 100 INJECTION, SOLUTION INTRAVENOUS; SUBCUTANEOUS at 17:46

## 2023-06-01 RX ADMIN — ACETAMINOPHEN 1000 MG: 325 TABLET ORAL at 05:03

## 2023-06-01 RX ADMIN — DILTIAZEM HYDROCHLORIDE 120 MG: 120 CAPSULE, COATED, EXTENDED RELEASE ORAL at 09:29

## 2023-06-01 RX ADMIN — PREGABALIN 25 MG: 25 CAPSULE ORAL at 14:59

## 2023-06-01 RX ADMIN — APIXABAN 5 MG: 5 TABLET, FILM COATED ORAL at 21:24

## 2023-06-01 RX ADMIN — INSULIN LISPRO 5 UNITS: 100 INJECTION, SOLUTION INTRAVENOUS; SUBCUTANEOUS at 12:41

## 2023-06-01 RX ADMIN — AMOXICILLIN 1000 MG: 500 CAPSULE ORAL at 05:03

## 2023-06-01 RX ADMIN — MIDODRINE HYDROCHLORIDE 5 MG: 5 TABLET ORAL at 12:15

## 2023-06-01 RX ADMIN — DOCUSATE SODIUM 50MG AND SENNOSIDES 8.6MG 1 TABLET: 8.6; 5 TABLET, FILM COATED ORAL at 09:29

## 2023-06-01 RX ADMIN — INSULIN LISPRO 2 UNITS: 100 INJECTION, SOLUTION INTRAVENOUS; SUBCUTANEOUS at 12:41

## 2023-06-01 RX ADMIN — OXYCODONE HYDROCHLORIDE 10 MG: 5 TABLET ORAL at 09:34

## 2023-06-01 RX ADMIN — AMOXICILLIN 1000 MG: 500 CAPSULE ORAL at 15:26

## 2023-06-01 RX ADMIN — INSULIN LISPRO 8 UNITS: 100 INJECTION, SOLUTION INTRAVENOUS; SUBCUTANEOUS at 17:46

## 2023-06-01 RX ADMIN — ACETAMINOPHEN 1000 MG: 325 TABLET ORAL at 14:59

## 2023-06-01 RX ADMIN — METOPROLOL SUCCINATE 50 MG: 50 TABLET, EXTENDED RELEASE ORAL at 09:29

## 2023-06-01 RX ADMIN — ACETAMINOPHEN 1000 MG: 325 TABLET ORAL at 21:24

## 2023-06-01 RX ADMIN — Medication 10 MG: at 21:24

## 2023-06-01 RX ADMIN — INSULIN DETEMIR 45 UNITS: 100 INJECTION, SOLUTION SUBCUTANEOUS at 21:24

## 2023-06-01 RX ADMIN — MIDODRINE HYDROCHLORIDE 5 MG: 5 TABLET ORAL at 09:29

## 2023-06-01 RX ADMIN — APIXABAN 5 MG: 5 TABLET, FILM COATED ORAL at 09:29

## 2023-06-01 RX ADMIN — FAMOTIDINE 40 MG: 20 TABLET ORAL at 09:29

## 2023-06-01 RX ADMIN — ATORVASTATIN CALCIUM 10 MG: 10 TABLET, FILM COATED ORAL at 21:23

## 2023-06-01 RX ADMIN — INSULIN DETEMIR 35 UNITS: 100 INJECTION, SOLUTION SUBCUTANEOUS at 09:30

## 2023-06-01 RX ADMIN — INSULIN LISPRO 2 UNITS: 100 INJECTION, SOLUTION INTRAVENOUS; SUBCUTANEOUS at 09:29

## 2023-06-01 RX ADMIN — MIDODRINE HYDROCHLORIDE 5 MG: 5 TABLET ORAL at 17:46

## 2023-06-01 RX ADMIN — DIGOXIN 125 MCG: 125 TABLET ORAL at 12:15

## 2023-06-01 RX ADMIN — OXYCODONE HYDROCHLORIDE 10 MG: 5 TABLET ORAL at 03:30

## 2023-06-01 RX ADMIN — OXYCODONE HYDROCHLORIDE 10 MG: 5 TABLET ORAL at 21:24

## 2023-06-01 NOTE — THERAPY TREATMENT NOTE
Acute Care - Physical Therapy Treatment Note  KEO Dominguez     Patient Name: Jose Shaikh  : 1960  MRN: [id number]  Today's Date: 2023      Visit Dx:     ICD-10-CM ICD-9-CM   1. Acute febrile illness  R50.9 780.60   2. Sepsis without acute organ dysfunction, due to unspecified organism  A41.9 038.9     995.91   3. Pneumonia of right upper lobe due to infectious organism  J18.9 486   4. Uncontrolled type 2 diabetes mellitus with hyperglycemia  E11.65 250.02   5. Atrial fibrillation with rapid ventricular response  I48.91 427.31   6. Acute osteomyelitis of right foot  M86.171 730.07   7. Decreased activities of daily living (ADL)  Z78.9 V49.89   8. Difficulty in walking  R26.2 719.7     Patient Active Problem List   Diagnosis   • Diabetic ulcer of left heel associated with type 2 DM   • Acute osteomyelitis of left calcaneus    • Diabetic ulcer of left heel associated with type 2 DM   • Diabetic ulcer of right midfoot associated with type 2 DM   • Paroxysmal atrial fibrillation   • Essential hypertension   • Hyperlipidemia LDL goal <100   • Cellulitis and abscess of foot   • High alkaline phosphatase level   • Osteomyelitis   • Onychomycosis   • Onychocryptosis   • Foot pain, bilateral   • Osteomyelitis of foot, right, acute   • Cellulitis of right foot   • Type 2 diabetes mellitus, with long-term current use of insulin   • Class 3 severe obesity due to excess calories with serious comorbidity and body mass index (BMI) of 45.0 to 49.9 in adult   • Anxiety disorder, unspecified   • Claustrophobia   • Dependence on wheelchair   • Depression, unspecified   • Long term (current) use of anticoagulants   • Long term (current) use of oral hypoglycemic drugs   • Wound of foot   • Non-prs chronic ulcer oth prt r foot limited to brkdwn skin   • Orthostatic hypotension   • Other chronic osteomyelitis, right ankle and foot   • Personal history of nicotine dependence   • Thrombocytopenia, unspecified   • Unspecified open  wound, right foot, initial encounter   • Diabetic foot infection   • Subacute osteomyelitis of right foot   • Right foot pain   • Sepsis   • Onychomycosis   • Foot pain, left     Past Medical History:   Diagnosis Date   • Absence of toe of right foot    • Acute osteomyelitis of left calcaneus  8/18/2021   • Anxiety and depression    • Arthritis    • Claustrophobia    • Corns and callus    • Diabetic ulcer of left heel associated with type 2 DM 8/18/2021   • Diabetic ulcer of left heel associated with type 2 DM 7/6/2021   • Diabetic ulcer of right midfoot associated with type 2 DM 8/18/2021   • Difficulty walking    • Essential hypertension 8/31/2021   • Hammertoe    • Hyperlipidemia LDL goal <100 8/31/2021   • Ingrown toenail    • Obesity    • Paroxysmal atrial fibrillation 8/31/2021   • Polyneuropathy    • Pressure ulcer, stage 1    • Tinea unguium    • Type 2 diabetes mellitus with polyneuropathy      Past Surgical History:   Procedure Laterality Date   • CYST REMOVAL      center of back; benign   • INCISION AND DRAINAGE ABSCESS      back   • INCISION AND DRAINAGE LEG Right 12/10/2021    Procedure: INCISION AND DRAINAGE LOWER EXTREMITY;  Surgeon: Ash Leyva DPM;  Location: [facility];  Service: Podiatry;  Laterality: Right;   • OTHER SURGICAL HISTORY      Surgical clips left foot   • TOE SURGERY Right     Removal of 5th toe   • TRANS METATARSAL AMPUTATION Right 12/2/2021    Procedure: AMPUTATION TRANS METATARSAL;  Surgeon: Ash Leyva DPM;  Location: [facility] OR;  Service: Podiatry;  Laterality: Right;   • WRIST SURGERY Left     repair of injury     PT Assessment (last 12 hours)     PT Evaluation and Treatment     Row Name 06/01/23 1128          Physical Therapy Time and Intention    Subjective Information complains of;weakness;fatigue;pain  -DK     Document Type therapy note (daily note)  -DK     Mode of Treatment individual therapy;physical therapy  -DK     Patient Effort good   -DK     Symptoms Noted During/After Treatment fatigue;increased pain  -DK     Comment Pt was somewhat grumpy during treatment session.  He reports fearful of falling.  -     Row Name 06/01/23 1128          Pain    Pretreatment Pain Rating 7/10  -DK     Posttreatment Pain Rating 7/10  -DK     Pain Location - Side/Orientation Bilateral  -DK     Pain Location generalized  -DK     Pain Location - hip;knee;foot  -DK     Pain Intervention(s) Repositioned;Ambulation/increased activity;Distraction;Therapeutic presence  -     Row Name 06/01/23 1128          Cognition    Affect/Mental Status (Cognition) WFL  -DK     Orientation Status (Cognition) oriented x 4  -DK     Follows Commands (Cognition) WFL  -DK     Cognitive Function WFL  -DK     Personal Safety Interventions gait belt;nonskid shoes/slippers when out of bed;supervised activity  -     Row Name 06/01/23 1128          Mobility    Extremity Weight-bearing Status left lower extremity;right lower extremity  -DK     Left Lower Extremity (Weight-bearing Status) non weight-bearing (NWB)  -DK     Right Lower Extremity (Weight-bearing Status) non weight-bearing (NWB)  -DK     Additional Documentation --  MD bradley right heel and left forefoot weight bearing.  -     Row Name 06/01/23 1128          Bed Mobility    Bed Mobility scooting/bridging;supine-sit-supine  -DK     All Activities, [place] (Bed Mobility) maximum assist (25% patient effort);2 person assist  -DK     Scooting/Bridging [place] (Bed Mobility) maximum assist (25% patient effort);2 person assist  -DK     Supine-Sit [place] (Bed Mobility) contact guard;1 person assist;standby assist  -DK     Sit-Supine [place] (Bed Mobility) contact guard;1 person assist;minimum assist (75% patient effort)  -DK     Supine-Sit-Supine [place] (Bed Mobility) contact guard;1 person assist;standby assist;minimum assist (75% patient effort)  -DK     Bed Mobility, Safety Issues decreased use of arms for  pushing/pulling;decreased use of legs for bridging/pushing  -     Assistive Device (Bed Mobility) bed rails;draw sheet  -     Comment, (Bed Mobility) Pt sat EOB x 2-3 minutes with SBA  -     Row Name 06/01/23 1128          Transfers    Transfers sit-stand transfer;stand-sit transfer  -DK     Comment, (Transfers) Pt stood x 4-5 seconds rolling walker and room air, assist x 2 persons, then c/o pain, fear of falling and was returned to bed on alert post treatment.  He requires the bed level greatly elevated to attempt standing.  -     Row Name 06/01/23 1128          Sit-Stand Transfer    Sit-Stand [place] (Transfers) contact guard;minimum assist (75% patient effort);2 person assist  -     Assistive Device (Sit-Stand Transfers) walker, front-wheeled  -     Row Name 06/01/23 1128          Stand-Sit Transfer    Stand-Sit [place] (Transfers) contact guard;minimum assist (75% patient effort);2 person assist  -     Row Name 06/01/23 1128          Safety Issues, Functional Mobility    Safety Issues Affecting Function (Mobility) awareness of need for assistance;impulsivity;ability to follow commands;judgment;safety precaution awareness  -     Impairments Affecting Function (Mobility) balance;endurance/activity tolerance;pain;range of motion (ROM);strength  -     Row Name 06/01/23 1128          Balance    Balance Assessment sitting static balance;sitting dynamic balance;standing static balance  -     Static Sitting Balance standby assist  -     Dynamic Sitting Balance standby assist  -     Position, Sitting Balance unsupported;sitting edge of bed  -     Static Standing Balance minimal assist;moderate assist;2-person assist  -     Position/Device Used, Standing Balance walker, front-wheeled  -     Balance Interventions standing;supported;static;tandem standing  -     Row Name 06/01/23 1128          Motor Skills    Motor Skills --  therapeutic exercises  -     Coordination WFL  -DK      Therapeutic Exercise hip;knee;ankle  -DK     Row Name 06/01/23 1128          Hip (Therapeutic Exercise)    Hip (Therapeutic Exercise) AAROM (active assistive range of motion)  -     Hip AAROM (Therapeutic Exercise) bilateral;flexion;extension;aBduction;aDduction;supine;10 repetitions;2 sets  -DK     Row Name 06/01/23 1128          Knee (Therapeutic Exercise)    Knee (Therapeutic Exercise) AAROM (active assistive range of motion)  -     Knee AAROM (Therapeutic Exercise) bilateral;flexion;extension;supine;10 repetitions;2 sets  -DK     Row Name 06/01/23 1128          Ankle (Therapeutic Exercise)    Ankle (Therapeutic Exercise) AROM (active range of motion)  -     Ankle AROM (Therapeutic Exercise) bilateral;dorsiflexion;plantarflexion;supine;20 repititions  -DK     Row Name             Wound 12/02/21 Right anterior foot Incision    Wound - Properties Group Placement Date: 12/02/21  -ES Side: Right  -ES Orientation: anterior  -ES Location: foot  -ES Primary Wound Type: Incision  -ES    Retired Wound - Properties Group Placement Date: 12/02/21  -ES Side: Right  -ES Orientation: anterior  -ES Location: foot  -ES Primary Wound Type: Incision  -ES    Retired Wound - Properties Group Date first assessed: 12/02/21  -ES Side: Right  -ES Location: foot  -ES Primary Wound Type: Incision  -ES    Row Name             Wound 06/22/21 1133 Left lateral heel    Wound - Properties Group Placement Date: 06/22/21  -FABIOLA Placement Time: 1133  -FABIOLA Present on Hospital Admission: Y  -FABIOLA Side: Left  -FABIOLA Orientation: lateral  -FABIOLA Location: heel  -FABIOLA    Retired Wound - Properties Group Placement Date: 06/22/21  -FABIOLA Placement Time: 1133  -FABIOLA Present on Hospital Admission: Y  -FABIOLA Side: Left  -FABIOLA Orientation: lateral  -FABIOLA Location: heel  -FABIOLA    Retired Wound - Properties Group Date first assessed: 06/22/21  -FABIOLA Time first assessed: 1133  -FABIOLA Present on Hospital Admission: Y  -FABIOLA Side: Left  -FABIOLA Location: heel  -FABIOLA    Row Name             Wound  05/19/23 1317 gluteal Blisters    Wound - Properties Group Placement Date: 05/19/23  -RASHARD Placement Time: 1317  -RASHARD Present on Hospital Admission: N  -RASHARD Location: gluteal  -RASHARD Primary Wound Type: Blisters  -RASHARD    Retired Wound - Properties Group Placement Date: 05/19/23  -RASHARD Placement Time: 1317  -RASHARD Present on Hospital Admission: N  -RASHARD Location: gluteal  -RASHARD Primary Wound Type: Blisters  -RASHARD    Retired Wound - Properties Group Date first assessed: 05/19/23  -RASHARD Time first assessed: 1317  -RASHARD Present on Hospital Admission: N  -RASHARD Location: gluteal  -RASHARD Primary Wound Type: Blisters  -RASHARD    Row Name 06/01/23 1128          Plan of Care Review    Plan of Care Reviewed With patient  -DK     Progress no change  -DK     Row Name 06/01/23 1128          Positioning and Restraints    Pre-Treatment Position in bed  -DK     Post Treatment Position bed  -DK     In Bed supine;call light within reach;encouraged to call for assist;exit alarm on;legs elevated;heels elevated  side rails up x 4  -DK     Row Name 06/01/23 1128          Therapy Assessment/Plan (PT)    Rehab Potential (PT) fair, will monitor progress closely  -DK     Criteria for Skilled Interventions Met (PT) skilled treatment is necessary  -DK     Therapy Frequency (PT) daily  -DK     Problem List (PT) problems related to;balance;mobility;range of motion (ROM);strength;pain  -DK     Activity Limitations Related to Problem List (PT) unable to ambulate safely;unable to transfer safely  -DK     Row Name 06/01/23 1128          Progress Summary (PT)    Progress Toward Functional Goals (PT) progress toward functional goals is fair  -DK           User Key  (r) = Recorded By, (t) = Taken By, (c) = Cosigned By    Initials Name Provider Type    Karon Jordan RN Registered Nurse    Mckenna Ladnaverde PTA Physical Therapist Assistant    Marlen Vaughn, RN Registered Nurse    Katlyn Garcia RN Registered Nurse                  PT Recommendation and Plan  Planned  Therapy Interventions (PT): balance training, bed mobility training, gait training, home exercise program, strengthening, transfer training  Therapy Frequency (PT): daily  Progress Summary (PT)  Progress Toward Functional Goals (PT): progress toward functional goals is fair  Plan of Care Reviewed With: patient  Progress: no change   Outcome Measures     Row Name 06/01/23 1128 05/31/23 0844 05/30/23 1126       How much help from another person do you currently need...    Turning from your back to your side while in flat bed without using bedrails? 3  -DK 4  -DK 3  -DK    Moving from lying on back to sitting on the side of a flat bed without bedrails? 2  -DK 3  -DK 2  -DK    Moving to and from a bed to a chair (including a wheelchair)? 1  -DK 1  -DK 1  -DK    Standing up from a chair using your arms (e.g., wheelchair, bedside chair)? 2  -DK 2  -DK 1  -DK    Climbing 3-5 steps with a railing? 1  -DK 1  -DK 1  -DK    To walk in hospital room? 1  -DK 1  -DK 1  -DK    AM-PAC 6 Clicks Score (PT) 10  -DK 12  -DK 9  -DK       Functional Assessment    Outcome Measure Options AM-PAC 6 Clicks Basic Mobility (PT)  -DK AM-PAC 6 Clicks Basic Mobility (PT)  -DK AM-PAC 6 Clicks Basic Mobility (PT)  -DK          User Key  (r) = Recorded By, (t) = Taken By, (c) = Cosigned By    Initials Name Provider Type    Mckenna Landaverde PTA Physical Therapist Assistant                 Time Calculation:    PT Charges     Row Name 06/01/23 1137             Time Calculation    PT Received On 06/01/23  -DK      PT Goal Re-Cert Due Date 06/03/23  -DK         Timed Charges    69042 - PT Therapeutic Exercise Minutes 14  -DK      16497 - Gait Training Minutes  1  -DK      69271 - PT Therapeutic Activity Minutes 25  -DK         Total Minutes    Timed Charges Total Minutes 40  -DK       Total Minutes 40  -DK            User Key  (r) = Recorded By, (t) = Taken By, (c) = Cosigned By    Initials Name Provider Type    Mckenna Landaverde PTA Physical  Therapist Assistant              Therapy Charges for Today     Code Description Service Date Service Provider Modifiers Qty    16252475636 HC PT THER PROC EA 15 MIN 5/31/2023 Mckenna Wong, PTA GP 1    38485421445 HC PT THERAPEUTIC ACT EA 15 MIN 5/31/2023 Mckenna Wong, PTA GP 1    32101860035 HC PT THER PROC EA 15 MIN 6/1/2023 Mckenna Wong, PTA GP 1    19389761989 HC PT THERAPEUTIC ACT EA 15 MIN 6/1/2023 Mckenna Wong, PTA GP 2          PT G-Codes  Outcome Measure Options: AM-PAC 6 Clicks Basic Mobility (PT)  AM-PAC 6 Clicks Score (PT): 10  AM-PAC 6 Clicks Score (OT): 14    Mckenna Wong PTA  6/1/2023

## 2023-06-01 NOTE — PROGRESS NOTES
[facility]   Hospitalist Progress Note  Date: 2023  Patient Name: Jose Shaikh  : 1960  MRN: [id number]  Date of admission: 2023      Subjective   Subjective     Chief Complaint: Left hip pain    Summary:   Jose Shaikh is a 62 y.o. male past medical history of osteomyelitis, type 2 diabetes, hypertension, A-fib, dyslipidemia, obesity with BMI of 48, who was recently hospital for osteomyelitis and he returns due to hip pain.      Patient was recently admitted to the hospital in early April for foot infection.  There was recommendation for amputation due to osteomyelitis in the foot and the patient refused.  He was unable to be placed at that time.  He is a sex offender making it very difficult for placement although [facility] was an option.  As result, he opted to go home on oral antibiotics to treat Alcaligenes facialis and Morganella morganii with doxycycline and ciprofloxacin.  The patient was seen in wound care in middle of April and at that time Dr. Daniels attempted to switch antibiotics to Levaquin per pharmacy recommendations based off culture data but was unable to get hold the patient so he was never switched.  The patient states the only reason he came to the emergency department was because his left hip was hurting.  Unaware of fevers.  Chills.  Cough.  Shortness of breath.  The patient came to the ER for hip pain.    Patient blood culture grew Streptococcus agalactiae.  Zyvox DC'd.  Patient does not want any amputation.  Patient wound culture from right foot also grew Streptococcus.  X-ray of pelvis and hip shows DJD of bilateral hips left worse than right.  Patient refused MRI of the left hip and order was canceled as patient is very claustrophobic.  Per ID recommendation needs 6 weeks of oral amoxicillin for osteomyelitis of right foot.  Patient is doing well, he is weak and debilitated however and Social work is arranging placement in a rehab facility for  patient    Interval Followup: Resting. No new issues. No fevers.     Objective   Objective     Vitals:   Temp:  [97.3 °F (36.3 °C)-97.9 °F (36.6 °C)] 97.9 °F (36.6 °C)  Heart Rate:  [64-72] 72  Resp:  [16-18] 18  BP: (103-128)/(51-68) 111/61  Physical Exam      GEN: No acute distress, resting comfortably  HEENT: Moist mucous membranes  LUNGS: Equal chest rise bilaterally  CARDIAC: Regular rate and rhythm  NEURO: Moving all 4 extremities spontaneously     Result Review    Result Review:  I have personally reviewed the results for the past 24 hours and agree with these findings:  [x]  Laboratory  [x]  Microbiology  [x]  Radiology  [x]  EKG/Telemetry   []  Cardiology/Vascular   []  Pathology  [x]  Old records  [x]  Other: Medications    Assessment & Plan   Assessment / Plan     Assessment:  Sepsis present on admission.  Patient presented with fever, tachycardia, lactic acidosis, elevated procalcitonin and leukocytosis.  Resolved  Right upper lobe healthcare associated pneumonia .  Streptococcus agalactiae bacteremia.  Subsequent cultures negative  Chronic osteomyelitis of right foot involving cuboid and may be cuneiform.  On p.o. amoxicillin for 6 weeks  Cellulitis of right foot due to Streptococcus agalactiae.  Paroxysmal A-fib with intermittent RVR on Eliquis.  Bilateral diabetic foot ulcers.  NIDDM.  Most recent A1c 6.6 in April 2023  Morbid obesity BMI of 48.1.  S/p TMA of right foot.  Hyponatremia.  likely from hyperglycemia/volume overload.  Clinically significant.  Improved  Hypophosphatemia.  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  Chronic shoulder neck pain    Plan:  May need psych eval as patient feeling hopeless due to not able to get up.  Patient refused MRI of left hip.  X-ray showed arthritis  Continue to fluid restriction for hyponatremia, eased to 2 L/day.    Recheck BMP in am  Right foot is likely source of bacteremia as per ID.  Wound culture and blood culture noted.  Repeat blood  cultures final results with no growth.  CTX --> Amoxil with plans for 6 weeks of therapy for presumed OM from 5/13. Last dose 6/23  Appreciate podiatry input.  Per podiatry right foot wound not source of infection?  Patient does not want below-knee amputation.  Continue midodrine, monitor blood pressure  Continue p.o. digoxin  Continue long-acting p.o. Cardizem.    Continue home metoprolol at increased dose, titrate to effect  Continue IV and oral narcotics for pain control.  Continue on Lyrica.  Continue basal bolus insulin, titrate to effect. Inc levemir and scheduled humalog today  Heating pad to neck and shoulder pain areas, pain improving  Continue Flexeril for muscle spasm, monitor for sedation  Continue wound care recommendations.    Continue PT/OT    Reviewed patients labs and imaging, and discussed with patient and nurse at bedside.  Patient has been declining steadily and not able to get up due to severe arthritis involving left hip and bilateral knees.  Await rehab placement.  Difficult due to his past history and inability to do much therapy    DVT prophylaxis:  Medical DVT prophylaxis orders are present.  Home Eliquis    CODE STATUS:   Level Of Support Discussed With: Patient  Code Status (Patient has no pulse and is not breathing): CPR (Attempt to Resuscitate)  Medical Interventions (Patient has pulse or is breathing): Full Support  .  Electronically signed by Ashvin Tyler MD, 06/01/23, 12:17 PM EDT.

## 2023-06-01 NOTE — PLAN OF CARE
Goal Outcome Evaluation:  Plan of Care Reviewed With: patient        Progress: no change  Outcome Evaluation: Pt remained A&Ox4 this shift. Also, pt remained on room air maintaining stable oxygen saturation. Pt was medicated with PRN Oxycodone once to help achieve an accpetable pain tolerance level. Wound and skin care completed, as per order. Pt toelrated well. Blood glucose readinings were 186, 197, and 242. Sliding scale insulin and schedulued insulin administered, as per order. All other vital signs remained stable.

## 2023-06-02 LAB
GLUCOSE BLDC GLUCOMTR-MCNC: 147 MG/DL (ref 70–99)
GLUCOSE BLDC GLUCOMTR-MCNC: 164 MG/DL (ref 70–99)
GLUCOSE BLDC GLUCOMTR-MCNC: 177 MG/DL (ref 70–99)
GLUCOSE BLDC GLUCOMTR-MCNC: 276 MG/DL (ref 70–99)

## 2023-06-02 PROCEDURE — 63710000001 INSULIN LISPRO (HUMAN) PER 5 UNITS

## 2023-06-02 PROCEDURE — 97110 THERAPEUTIC EXERCISES: CPT

## 2023-06-02 PROCEDURE — 99231 SBSQ HOSP IP/OBS SF/LOW 25: CPT | Performed by: INTERNAL MEDICINE

## 2023-06-02 PROCEDURE — 94799 UNLISTED PULMONARY SVC/PX: CPT

## 2023-06-02 PROCEDURE — 97530 THERAPEUTIC ACTIVITIES: CPT

## 2023-06-02 PROCEDURE — 63710000001 INSULIN DETEMIR PER 5 UNITS: Performed by: INTERNAL MEDICINE

## 2023-06-02 PROCEDURE — 82948 REAGENT STRIP/BLOOD GLUCOSE: CPT

## 2023-06-02 PROCEDURE — 63710000001 INSULIN LISPRO (HUMAN) PER 5 UNITS: Performed by: INTERNAL MEDICINE

## 2023-06-02 RX ADMIN — PREGABALIN 25 MG: 25 CAPSULE ORAL at 15:25

## 2023-06-02 RX ADMIN — INSULIN LISPRO 2 UNITS: 100 INJECTION, SOLUTION INTRAVENOUS; SUBCUTANEOUS at 17:44

## 2023-06-02 RX ADMIN — MIDODRINE HYDROCHLORIDE 5 MG: 5 TABLET ORAL at 09:29

## 2023-06-02 RX ADMIN — INSULIN LISPRO 8 UNITS: 100 INJECTION, SOLUTION INTRAVENOUS; SUBCUTANEOUS at 17:44

## 2023-06-02 RX ADMIN — OXYCODONE HYDROCHLORIDE 10 MG: 5 TABLET ORAL at 09:29

## 2023-06-02 RX ADMIN — AMOXICILLIN 1000 MG: 500 CAPSULE ORAL at 06:09

## 2023-06-02 RX ADMIN — AMOXICILLIN 1000 MG: 500 CAPSULE ORAL at 15:25

## 2023-06-02 RX ADMIN — INSULIN DETEMIR 45 UNITS: 100 INJECTION, SOLUTION SUBCUTANEOUS at 09:28

## 2023-06-02 RX ADMIN — DILTIAZEM HYDROCHLORIDE 120 MG: 120 CAPSULE, COATED, EXTENDED RELEASE ORAL at 09:29

## 2023-06-02 RX ADMIN — ACETAMINOPHEN 1000 MG: 325 TABLET ORAL at 06:09

## 2023-06-02 RX ADMIN — INSULIN LISPRO 8 UNITS: 100 INJECTION, SOLUTION INTRAVENOUS; SUBCUTANEOUS at 09:28

## 2023-06-02 RX ADMIN — INSULIN LISPRO 8 UNITS: 100 INJECTION, SOLUTION INTRAVENOUS; SUBCUTANEOUS at 12:56

## 2023-06-02 RX ADMIN — ATORVASTATIN CALCIUM 10 MG: 10 TABLET, FILM COATED ORAL at 23:14

## 2023-06-02 RX ADMIN — OXYCODONE HYDROCHLORIDE 10 MG: 5 TABLET ORAL at 23:50

## 2023-06-02 RX ADMIN — APIXABAN 5 MG: 5 TABLET, FILM COATED ORAL at 09:29

## 2023-06-02 RX ADMIN — OXYCODONE HYDROCHLORIDE 10 MG: 5 TABLET ORAL at 03:30

## 2023-06-02 RX ADMIN — MIDODRINE HYDROCHLORIDE 5 MG: 5 TABLET ORAL at 12:57

## 2023-06-02 RX ADMIN — METOPROLOL SUCCINATE 50 MG: 50 TABLET, EXTENDED RELEASE ORAL at 23:15

## 2023-06-02 RX ADMIN — ACETAMINOPHEN 1000 MG: 325 TABLET ORAL at 23:14

## 2023-06-02 RX ADMIN — AMOXICILLIN 1000 MG: 500 CAPSULE ORAL at 23:00

## 2023-06-02 RX ADMIN — ACETAMINOPHEN 1000 MG: 325 TABLET ORAL at 15:24

## 2023-06-02 RX ADMIN — MIDODRINE HYDROCHLORIDE 5 MG: 5 TABLET ORAL at 17:44

## 2023-06-02 RX ADMIN — METOPROLOL SUCCINATE 50 MG: 50 TABLET, EXTENDED RELEASE ORAL at 09:29

## 2023-06-02 RX ADMIN — APIXABAN 5 MG: 5 TABLET, FILM COATED ORAL at 23:15

## 2023-06-02 RX ADMIN — PREGABALIN 25 MG: 25 CAPSULE ORAL at 23:15

## 2023-06-02 RX ADMIN — DIGOXIN 125 MCG: 125 TABLET ORAL at 12:57

## 2023-06-02 RX ADMIN — FAMOTIDINE 40 MG: 20 TABLET ORAL at 09:29

## 2023-06-02 RX ADMIN — DOCUSATE SODIUM 50MG AND SENNOSIDES 8.6MG 2 TABLET: 8.6; 5 TABLET, FILM COATED ORAL at 09:29

## 2023-06-02 RX ADMIN — INSULIN LISPRO 2 UNITS: 100 INJECTION, SOLUTION INTRAVENOUS; SUBCUTANEOUS at 09:29

## 2023-06-02 RX ADMIN — INSULIN DETEMIR 45 UNITS: 100 INJECTION, SOLUTION SUBCUTANEOUS at 23:13

## 2023-06-02 RX ADMIN — CYCLOBENZAPRINE 10 MG: 10 TABLET, FILM COATED ORAL at 23:50

## 2023-06-02 RX ADMIN — INSULIN LISPRO 4 UNITS: 100 INJECTION, SOLUTION INTRAVENOUS; SUBCUTANEOUS at 23:50

## 2023-06-02 RX ADMIN — PREGABALIN 25 MG: 25 CAPSULE ORAL at 06:09

## 2023-06-02 RX ADMIN — OXYCODONE HYDROCHLORIDE 10 MG: 5 TABLET ORAL at 17:43

## 2023-06-02 RX ADMIN — ALUMINUM HYDROXIDE, MAGNESIUM HYDROXIDE, AND DIMETHICONE 15 ML: 400; 400; 40 SUSPENSION ORAL at 23:51

## 2023-06-02 NOTE — PROGRESS NOTES
[facility]   Hospitalist Progress Note  Date: 2023  Patient Name: Jose Shaikh  : 1960  MRN: [id number]  Date of admission: 2023      Subjective   Subjective     Chief Complaint: Left hip pain    Summary:   Jose Shaikh is a 62 y.o. male past medical history of osteomyelitis, type 2 diabetes, hypertension, A-fib, dyslipidemia, obesity with BMI of 48, who was recently hospital for osteomyelitis and he returns due to hip pain.      Patient was recently admitted to the hospital in early April for foot infection.  There was recommendation for amputation due to osteomyelitis in the foot and the patient refused.  He was unable to be placed at that time.  He is a sex offender making it very difficult for placement although [facility] was an option.  As result, he opted to go home on oral antibiotics to treat Alcaligenes facialis and Morganella morganii with doxycycline and ciprofloxacin.  The patient was seen in wound care in middle of April and at that time Dr. Daniels attempted to switch antibiotics to Levaquin per pharmacy recommendations based off culture data but was unable to get hold the patient so he was never switched.  The patient states the only reason he came to the emergency department was because his left hip was hurting.  Unaware of fevers.  Chills.  Cough.  Shortness of breath.  The patient came to the ER for hip pain.    Patient blood culture grew Streptococcus agalactiae.  Zyvox DC'd.  Patient does not want any amputation.  Patient wound culture from right foot also grew Streptococcus.  X-ray of pelvis and hip shows DJD of bilateral hips left worse than right.  Patient refused MRI of the left hip and order was canceled as patient is very claustrophobic.  Per ID recommendation needs 6 weeks of oral amoxicillin for osteomyelitis of right foot.  Patient is doing well, he is weak and debilitated however and Social work is arranging placement in a rehab facility for  patient    Interval Followup: Resting. No new issues. No fevers.  Reports that he believes that he worked well today with PT.    Objective   Objective     Vitals:   Temp:  [97.4 °F (36.3 °C)-97.9 °F (36.6 °C)] 97.7 °F (36.5 °C)  Heart Rate:  [65-71] 71  Resp:  [16-18] 18  BP: ()/(40-59) 107/59  Physical Exam      GEN: No acute distress, and up in the bed to be resting comfortably  HEENT: Moist mucous membranes  LUNGS: Equal chest rise bilaterally  CARDIAC: Regular rate and rhythm  NEURO: Moving all 4 extremities spontaneously     Result Review    Result Review:  I have personally reviewed the results for the past 24 hours and agree with these findings:  [x]  Laboratory  [x]  Microbiology  [x]  Radiology  [x]  EKG/Telemetry   []  Cardiology/Vascular   []  Pathology  [x]  Old records  [x]  Other: Medications    Assessment & Plan   Assessment / Plan     Assessment:  Sepsis present on admission.  Patient presented with fever, tachycardia, lactic acidosis, elevated procalcitonin and leukocytosis.  Resolved  Right upper lobe healthcare associated pneumonia .  Streptococcus agalactiae bacteremia.  Subsequent cultures negative  Chronic osteomyelitis of right foot involving cuboid and maybe cuneiform.  On p.o. amoxicillin for 6 weeks  Cellulitis of right foot due to Streptococcus agalactiae.  Paroxysmal A-fib with intermittent RVR on Eliquis.  Bilateral diabetic foot ulcers.  NIDDM.  Most recent A1c 6.6 in April 2023  Morbid obesity BMI of 46.1, down from 48.1 on admission  S/p TMA of right foot.  Hyponatremia.  likely from hyperglycemia/volume overload.  Clinically significant.  Resolved.  Hypophosphatemia.  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  Chronic shoulder neck pain    Plan:  May need psych eval as patient feeling hopeless due to not able to get up.  Patient refused MRI of left hip.  X-ray showed arthritis  Continue to fluid restriction for hyponatremia, eased to 2 L/day.    Right foot is  likely source of bacteremia as per ID.  Wound culture and blood culture noted.  Repeat blood cultures final results with no growth.  CTX --> Amoxil with plans for 6 weeks of therapy for presumed OM from 5/13. Last dose 6/23  Appreciate podiatry input.  Per podiatry right foot wound not source of infection?  Patient does not want below-knee amputation.  Continue midodrine, monitor blood pressure  Continue p.o. digoxin  Continue long-acting p.o. Cardizem.    Continue home metoprolol at increased dose, titrate to effect  Continue oral narcotics for pain control.  Continue on Lyrica.  Continue basal bolus insulin, titrate to effect. Inc levemir and scheduled humalog on 6/1  Heating pad to neck and shoulder pain areas, pain improving  Continue Flexeril for muscle spasm, monitor for sedation  Continue wound care recommendations.    Continue PT/OT    Of care discussed with patient and patient nurse.  Patient has been declining steadily and not able to get up due to severe arthritis involving left hip and bilateral knees.  Awaiing rehab placement.  Difficult due to his past history and inability to do much therapy    DVT prophylaxis:  Medical DVT prophylaxis orders are present.  Home Eliquis    CODE STATUS:   Level Of Support Discussed With: Patient  Code Status (Patient has no pulse and is not breathing): CPR (Attempt to Resuscitate)  Medical Interventions (Patient has pulse or is breathing): Full Support  .  Electronically signed by Cayla Mcdonald MD, 06/02/23, 3:06 PM EDT.

## 2023-06-02 NOTE — THERAPY TREATMENT NOTE
Acute Care - Physical Therapy Progress Note  [facility]     Patient Name: Jose Shaikh  : 1960  MRN: [id number]  Today's Date: 2023      Visit Dx:     ICD-10-CM ICD-9-CM   1. Acute febrile illness  R50.9 780.60   2. Sepsis without acute organ dysfunction, due to unspecified organism  A41.9 038.9     995.91   3. Pneumonia of right upper lobe due to infectious organism  J18.9 486   4. Uncontrolled type 2 diabetes mellitus with hyperglycemia  E11.65 250.02   5. Atrial fibrillation with rapid ventricular response  I48.91 427.31   6. Acute osteomyelitis of right foot  M86.171 730.07   7. Decreased activities of daily living (ADL)  Z78.9 V49.89   8. Difficulty in walking  R26.2 719.7     Patient Active Problem List   Diagnosis   • Diabetic ulcer of left heel associated with type 2 DM   • Acute osteomyelitis of left calcaneus    • Diabetic ulcer of left heel associated with type 2 DM   • Diabetic ulcer of right midfoot associated with type 2 DM   • Paroxysmal atrial fibrillation   • Essential hypertension   • Hyperlipidemia LDL goal <100   • Cellulitis and abscess of foot   • High alkaline phosphatase level   • Osteomyelitis   • Onychomycosis   • Onychocryptosis   • Foot pain, bilateral   • Osteomyelitis of foot, right, acute   • Cellulitis of right foot   • Type 2 diabetes mellitus, with long-term current use of insulin   • Class 3 severe obesity due to excess calories with serious comorbidity and body mass index (BMI) of 45.0 to 49.9 in adult   • Anxiety disorder, unspecified   • Claustrophobia   • Dependence on wheelchair   • Depression, unspecified   • Long term (current) use of anticoagulants   • Long term (current) use of oral hypoglycemic drugs   • Wound of foot   • Non-prs chronic ulcer oth prt r foot limited to brkdwn skin   • Orthostatic hypotension   • Other chronic osteomyelitis, right ankle and foot   • Personal history of nicotine dependence   • Thrombocytopenia, unspecified   • Unspecified open  wound, right foot, initial encounter   • Diabetic foot infection   • Subacute osteomyelitis of right foot   • Right foot pain   • Sepsis   • Onychomycosis   • Foot pain, left     Past Medical History:   Diagnosis Date   • Absence of toe of right foot    • Acute osteomyelitis of left calcaneus  8/18/2021   • Anxiety and depression    • Arthritis    • Claustrophobia    • Corns and callus    • Diabetic ulcer of left heel associated with type 2 DM 8/18/2021   • Diabetic ulcer of left heel associated with type 2 DM 7/6/2021   • Diabetic ulcer of right midfoot associated with type 2 DM 8/18/2021   • Difficulty walking    • Essential hypertension 8/31/2021   • Hammertoe    • Hyperlipidemia LDL goal <100 8/31/2021   • Ingrown toenail    • Obesity    • Paroxysmal atrial fibrillation 8/31/2021   • Polyneuropathy    • Pressure ulcer, stage 1    • Tinea unguium    • Type 2 diabetes mellitus with polyneuropathy      Past Surgical History:   Procedure Laterality Date   • CYST REMOVAL      center of back; benign   • INCISION AND DRAINAGE ABSCESS      back   • INCISION AND DRAINAGE LEG Right 12/10/2021    Procedure: INCISION AND DRAINAGE LOWER EXTREMITY;  Surgeon: Ash Leyva DPM;  Location: [facility];  Service: Podiatry;  Laterality: Right;   • OTHER SURGICAL HISTORY      Surgical clips left foot   • TOE SURGERY Right     Removal of 5th toe   • TRANS METATARSAL AMPUTATION Right 12/2/2021    Procedure: AMPUTATION TRANS METATARSAL;  Surgeon: Ash Leyva DPM;  Location: [facility] OR;  Service: Podiatry;  Laterality: Right;   • WRIST SURGERY Left     repair of injury     PT Assessment (last 12 hours)     PT Evaluation and Treatment     Row Name 06/02/23 1300          Physical Therapy Time and Intention    Subjective Information complains of;weakness  -CS     Document Type therapy note (daily note)  -CS     Mode of Treatment individual therapy;physical therapy  -CS     Patient Effort good  -CS      Symptoms Noted During/After Treatment fatigue  -CS     Row Name 06/02/23 1300          Pain    Pretreatment Pain Rating 0/10 - no pain  -CS     Posttreatment Pain Rating 0/10 - no pain  -CS     Row Name 06/02/23 1300          Hip (Therapeutic Exercise)    Hip AAROM (Therapeutic Exercise) bilateral;supine;10 repetitions;3 sets  hip abd/add, hip IR/ER, heel slides  -     Hip Isometrics (Therapeutic Exercise) bilateral;gluteal sets;supine;10 repetitions;3 second hold;3 sets  -CS     Row Name 06/02/23 1300          Knee (Therapeutic Exercise)    Knee AROM (Therapeutic Exercise) bilateral;SAQ (short arc quad);supine;30 repititions  -     Knee AAROM (Therapeutic Exercise) bilateral;supine;10 repetitions;3 sets  SLR's  -     Knee Isometrics (Therapeutic Exercise) bilateral;quad sets;supine;10 repetitions;3 second hold;3 sets  -     Row Name 06/02/23 1300          Ankle (Therapeutic Exercise)    Ankle AROM (Therapeutic Exercise) bilateral;dorsiflexion;plantarflexion;supine;30 repititions  -     Row Name             Wound 12/02/21 Right anterior foot Incision    Wound - Properties Group Placement Date: 12/02/21  -ES Side: Right  -ES Orientation: anterior  -ES Location: foot  -ES Primary Wound Type: Incision  -ES    Retired Wound - Properties Group Placement Date: 12/02/21  -ES Side: Right  -ES Orientation: anterior  -ES Location: foot  -ES Primary Wound Type: Incision  -ES    Retired Wound - Properties Group Date first assessed: 12/02/21  -ES Side: Right  -ES Location: foot  -ES Primary Wound Type: Incision  -ES    Row Name             Wound 06/22/21 1133 Left lateral heel    Wound - Properties Group Placement Date: 06/22/21  -FABIOLA Placement Time: 1133  -FABIOLA Present on Hospital Admission: Y  -FABIOLA Side: Left  -FABIOLA Orientation: lateral  -FABIOLA Location: heel  -FABIOLA    Retired Wound - Properties Group Placement Date: 06/22/21  -FABIOLA Placement Time: 1133  -FABIOLA Present on Hospital Admission: Y  -FABIOLA Side: Left  -FABIOLA Orientation:  lateral  -FABIOLA Location: heel  -FABIOLA    Retired Wound - Properties Group Date first assessed: 06/22/21  -FABIOLA Time first assessed: 1133  -FABIOLA Present on Hospital Admission: Y  -FABIOLA Side: Left  -FABIOLA Location: heel  -FABIOLA    Row Name             Wound 05/19/23 1317 gluteal Blisters    Wound - Properties Group Placement Date: 05/19/23  -RASHARD Placement Time: 1317  -RASHARD Present on Hospital Admission: N  -RASHARD Location: gluteal  -RASHARD Primary Wound Type: Blisters  -RASHARD    Retired Wound - Properties Group Placement Date: 05/19/23  -RASHARD Placement Time: 1317  -RASHARD Present on Hospital Admission: N  -RASHARD Location: gluteal  -RASHARD Primary Wound Type: Blisters  -RASHARD    Retired Wound - Properties Group Date first assessed: 05/19/23  -RASHARD Time first assessed: 1317  -RASHARD Present on Hospital Admission: N  -RASHARD Location: gluteal  -RASHARD Primary Wound Type: Blisters  -RASHARD    Row Name 06/02/23 1300          Positioning and Restraints    Pre-Treatment Position in bed  -CS     Post Treatment Position bed  -CS     In Bed sitting;call light within reach;encouraged to call for assist;exit alarm on;heels elevated  -CS     Row Name 06/02/23 1300          Progress Summary (PT)    Progress Toward Functional Goals (PT) progress toward functional goals is fair  -CS     Daily Progress Summary (PT) Multiple attempts made today to encourage pt. to T/F to chair or sit on side of bed; however, pt. declined these activities and states that he would like to do leg exercises to get stronger first.  After supine exerciese, bed was placed in chair position by pressing the chair button.  -CS           User Key  (r) = Recorded By, (t) = Taken By, (c) = Cosigned By    Initials Name Provider Type    Karon Jordan, RN Registered Nurse    Marlen Vaughn, RN Registered Nurse    Ronald Huerta PTA Physical Therapist Assistant    Katlyn Garcia RN Registered Nurse                  PT Recommendation and Plan     Progress Summary (PT)  Progress Toward Functional Goals (PT):  progress toward functional goals is fair  Daily Progress Summary (PT): Multiple attempts made today to encourage pt. to T/F to chair or sit on side of bed; however, pt. declined these activities and states that he would like to do leg exercises to get stronger first.  After supine exerciese, bed was placed in chair position by pressing the chair button.   Outcome Measures     Row Name 06/02/23 1300 06/01/23 1128 05/31/23 0844       How much help from another person do you currently need...    Turning from your back to your side while in flat bed without using bedrails? 3  -CS 3  -DK 4  -DK    Moving from lying on back to sitting on the side of a flat bed without bedrails? 2  -CS 2  -DK 3  -DK    Moving to and from a bed to a chair (including a wheelchair)? 2  -CS 1  -DK 1  -DK    Standing up from a chair using your arms (e.g., wheelchair, bedside chair)? 2  -CS 2  -DK 2  -DK    Climbing 3-5 steps with a railing? 1  -CS 1  -DK 1  -DK    To walk in hospital room? 1  -CS 1  -DK 1  -DK    AM-PAC 6 Clicks Score (PT) 11  -CS 10  -DK 12  -DK       Functional Assessment    Outcome Measure Options AM-PAC 6 Clicks Basic Mobility (PT)  -CS AM-PAC 6 Clicks Basic Mobility (PT)  -DK AM-PAC 6 Clicks Basic Mobility (PT)  -DK          User Key  (r) = Recorded By, (t) = Taken By, (c) = Cosigned By    Initials Name Provider Type    Mckenna Landaverde PTA Physical Therapist Assistant    Ronald Huerta PTA Physical Therapist Assistant                 Time Calculation:    PT Charges     Row Name 06/02/23 1318             Time Calculation    Start Time 1110  -CS      PT Received On 06/02/23  -CS         Timed Charges    52564 - PT Therapeutic Exercise Minutes 25  -CS      60840 - PT Therapeutic Activity Minutes 13  -CS         Total Minutes    Timed Charges Total Minutes 38  -CS       Total Minutes 38  -CS            User Key  (r) = Recorded By, (t) = Taken By, (c) = Cosigned By    Initials Name Provider Type    Ronald Huerta  [facility] Physical Therapist Assistant              Therapy Charges for Today     Code Description Service Date Service Provider Modifiers Qty    66226676322 HC PT THER PROC EA 15 MIN 6/2/2023 Ronald Fabian PTA GP 2    47399067636 HC PT THERAPEUTIC ACT EA 15 MIN 6/2/2023 Ronald Fabian PTA GP 1          PT G-Codes  Outcome Measure Options: AM-PAC 6 Clicks Basic Mobility (PT)  AM-PAC 6 Clicks Score (PT): 11  AM-PAC 6 Clicks Score (OT): 14    Ronald Fabian PTA  6/2/2023

## 2023-06-03 LAB
GLUCOSE BLDC GLUCOMTR-MCNC: 175 MG/DL (ref 70–99)
GLUCOSE BLDC GLUCOMTR-MCNC: 183 MG/DL (ref 70–99)
GLUCOSE BLDC GLUCOMTR-MCNC: 192 MG/DL (ref 70–99)
GLUCOSE BLDC GLUCOMTR-MCNC: 267 MG/DL (ref 70–99)

## 2023-06-03 PROCEDURE — 94799 UNLISTED PULMONARY SVC/PX: CPT

## 2023-06-03 PROCEDURE — 63710000001 INSULIN LISPRO (HUMAN) PER 5 UNITS

## 2023-06-03 PROCEDURE — 99231 SBSQ HOSP IP/OBS SF/LOW 25: CPT | Performed by: INTERNAL MEDICINE

## 2023-06-03 PROCEDURE — 63710000001 INSULIN LISPRO (HUMAN) PER 5 UNITS: Performed by: INTERNAL MEDICINE

## 2023-06-03 PROCEDURE — 63710000001 INSULIN DETEMIR PER 5 UNITS: Performed by: INTERNAL MEDICINE

## 2023-06-03 PROCEDURE — 82948 REAGENT STRIP/BLOOD GLUCOSE: CPT

## 2023-06-03 RX ADMIN — ACETAMINOPHEN 1000 MG: 325 TABLET ORAL at 14:34

## 2023-06-03 RX ADMIN — INSULIN LISPRO 2 UNITS: 100 INJECTION, SOLUTION INTRAVENOUS; SUBCUTANEOUS at 09:42

## 2023-06-03 RX ADMIN — MIDODRINE HYDROCHLORIDE 5 MG: 5 TABLET ORAL at 06:49

## 2023-06-03 RX ADMIN — MIDODRINE HYDROCHLORIDE 5 MG: 5 TABLET ORAL at 12:12

## 2023-06-03 RX ADMIN — INSULIN DETEMIR 45 UNITS: 100 INJECTION, SOLUTION SUBCUTANEOUS at 21:59

## 2023-06-03 RX ADMIN — INSULIN LISPRO 8 UNITS: 100 INJECTION, SOLUTION INTRAVENOUS; SUBCUTANEOUS at 09:42

## 2023-06-03 RX ADMIN — INSULIN LISPRO 4 UNITS: 100 INJECTION, SOLUTION INTRAVENOUS; SUBCUTANEOUS at 21:58

## 2023-06-03 RX ADMIN — INSULIN LISPRO 2 UNITS: 100 INJECTION, SOLUTION INTRAVENOUS; SUBCUTANEOUS at 17:24

## 2023-06-03 RX ADMIN — DIGOXIN 125 MCG: 125 TABLET ORAL at 12:12

## 2023-06-03 RX ADMIN — Medication 10 ML: at 21:58

## 2023-06-03 RX ADMIN — INSULIN LISPRO 2 UNITS: 100 INJECTION, SOLUTION INTRAVENOUS; SUBCUTANEOUS at 12:12

## 2023-06-03 RX ADMIN — APIXABAN 5 MG: 5 TABLET, FILM COATED ORAL at 09:41

## 2023-06-03 RX ADMIN — AMOXICILLIN 1000 MG: 500 CAPSULE ORAL at 06:49

## 2023-06-03 RX ADMIN — ACETAMINOPHEN 1000 MG: 325 TABLET ORAL at 21:58

## 2023-06-03 RX ADMIN — PREGABALIN 25 MG: 25 CAPSULE ORAL at 14:34

## 2023-06-03 RX ADMIN — AMOXICILLIN 1000 MG: 500 CAPSULE ORAL at 21:57

## 2023-06-03 RX ADMIN — METOPROLOL SUCCINATE 50 MG: 50 TABLET, EXTENDED RELEASE ORAL at 21:58

## 2023-06-03 RX ADMIN — DOCUSATE SODIUM 50MG AND SENNOSIDES 8.6MG 2 TABLET: 8.6; 5 TABLET, FILM COATED ORAL at 09:41

## 2023-06-03 RX ADMIN — FAMOTIDINE 40 MG: 20 TABLET ORAL at 09:41

## 2023-06-03 RX ADMIN — METOPROLOL SUCCINATE 50 MG: 50 TABLET, EXTENDED RELEASE ORAL at 09:41

## 2023-06-03 RX ADMIN — INSULIN LISPRO 8 UNITS: 100 INJECTION, SOLUTION INTRAVENOUS; SUBCUTANEOUS at 17:24

## 2023-06-03 RX ADMIN — PREGABALIN 25 MG: 25 CAPSULE ORAL at 21:58

## 2023-06-03 RX ADMIN — INSULIN DETEMIR 45 UNITS: 100 INJECTION, SOLUTION SUBCUTANEOUS at 09:43

## 2023-06-03 RX ADMIN — AMOXICILLIN 1000 MG: 500 CAPSULE ORAL at 14:33

## 2023-06-03 RX ADMIN — ATORVASTATIN CALCIUM 10 MG: 10 TABLET, FILM COATED ORAL at 21:57

## 2023-06-03 RX ADMIN — PREGABALIN 25 MG: 25 CAPSULE ORAL at 06:49

## 2023-06-03 RX ADMIN — OXYCODONE HYDROCHLORIDE 10 MG: 5 TABLET ORAL at 06:49

## 2023-06-03 RX ADMIN — MIDODRINE HYDROCHLORIDE 5 MG: 5 TABLET ORAL at 17:23

## 2023-06-03 RX ADMIN — INSULIN LISPRO 8 UNITS: 100 INJECTION, SOLUTION INTRAVENOUS; SUBCUTANEOUS at 12:12

## 2023-06-03 RX ADMIN — DILTIAZEM HYDROCHLORIDE 120 MG: 120 CAPSULE, COATED, EXTENDED RELEASE ORAL at 09:41

## 2023-06-03 RX ADMIN — APIXABAN 5 MG: 5 TABLET, FILM COATED ORAL at 21:58

## 2023-06-03 NOTE — PROGRESS NOTES
[facility]   Hospitalist Progress Note  Date: 6/3/2023  Patient Name: Jose Shaikh  : 1960  MRN: [id number]  Date of admission: 2023      Subjective   Subjective     Chief Complaint: Left hip pain    Summary:   Jose Shaikh is a 62 y.o. male past medical history of osteomyelitis, type 2 diabetes, hypertension, A-fib, dyslipidemia, obesity with BMI of 48, who was recently hospital for osteomyelitis and he returns due to hip pain.      Patient was recently admitted to the hospital in early April for foot infection.  There was recommendation for amputation due to osteomyelitis in the foot and the patient refused.  He was unable to be placed at that time.  He is a sex offender making it very difficult for placement although [facility] was an option.  As result, he opted to go home on oral antibiotics to treat Alcaligenes facialis and Morganella morganii with doxycycline and ciprofloxacin.  The patient was seen in wound care in middle of April and at that time Dr. Daniels attempted to switch antibiotics to Levaquin per pharmacy recommendations based off culture data but was unable to get hold the patient so he was never switched.  The patient states the only reason he came to the emergency department was because his left hip was hurting.  Unaware of fevers.  Chills.  Cough.  Shortness of breath.  The patient came to the ER for hip pain.    Patient blood culture grew Streptococcus agalactiae.  Zyvox DC'd.  Patient does not want any amputation.  Patient wound culture from right foot also grew Streptococcus.  X-ray of pelvis and hip shows DJD of bilateral hips left worse than right.  Patient refused MRI of the left hip and order was canceled as patient is very claustrophobic.  Per ID recommendation needs 6 weeks of oral amoxicillin for osteomyelitis of right foot.  Patient is doing well, he is weak and debilitated however and Social work is arranging placement in a rehab facility for  patient    Interval Followup: Complains of some bloating and gassiness today.  States he has been on the toilet a couple times thinking he is can have a bowel movement but it was all gas.  Reports last normal bowel movement was on yesterday.  He denies any nausea or vomiting.      Objective   Objective     Vitals:   Temp:  [97.3 °F (36.3 °C)-97.8 °F (36.6 °C)] 97.7 °F (36.5 °C)  Heart Rate:  [64-71] 66  Resp:  [16-20] 20  BP: (107-119)/(48-82) 116/61  Physical Exam      GEN: No acute distress, sitting up in the bed, resting comfortably  HEENT: Moist mucous membranes  LUNGS: Equal chest rise bilaterally  ABD: Soft nontender nondistended bowel sounds present.  CARDIAC: Regular rate and rhythm  NEURO: Moving all 4 extremities spontaneously, cranial 2-12 grossly intact, speech clear     Result Review    Result Review:  I have personally reviewed the results for the past 24 hours and agree with these findings:  [x]  Laboratory  []  Microbiology  []  Radiology  []  EKG/Telemetry   []  Cardiology/Vascular   []  Pathology  [x]  Old records  [x]  Other: Medications    Assessment & Plan   Assessment / Plan     Assessment:  Sepsis present on admission.  Patient presented with fever, tachycardia, lactic acidosis, elevated procalcitonin and leukocytosis.  Resolved  Right upper lobe healthcare associated pneumonia .  Streptococcus agalactiae bacteremia.  Subsequent cultures negative  Chronic osteomyelitis of right foot involving cuboid and maybe cuneiform.  On p.o. amoxicillin for 6 weeks  Cellulitis of right foot due to Streptococcus agalactiae.  Paroxysmal A-fib with intermittent RVR on Eliquis.  Bilateral diabetic foot ulcers.  NIDDM.  Most recent A1c 6.6 in April 2023  Morbid obesity BMI of 44.8, down from 48.1 on admission  S/p TMA of right foot.  Hyponatremia.  likely from hyperglycemia/volume overload.  Clinically significant.  Resolved.  Hypophosphatemia.  Supplemented  DJD of bilateral hips left worse than  right.  Chronic shoulder neck pain  Hypertension.  Blood pressure stable.    Plan:  May need psych eval as patient feeling hopeless due to not able to get up.  Patient refused MRI of left hip.  X-ray showed arthritis  Continue to fluid restriction for hyponatremia, eased to 2 L/day.    Right foot is likely source of bacteremia as per ID.  Wound culture and blood culture noted.  Repeat blood cultures final results with no growth.  CTX --> Amoxil with plans for 6 weeks of therapy for presumed OM from 5/13. Last dose 6/23  Appreciate podiatry input.  Per podiatry right foot wound not source of infection?  Patient does not want below-knee amputation.  Continue midodrine, monitor blood pressure  Continue p.o. digoxin  Continue long-acting p.o. Cardizem.    Continue home metoprolol at increased dose, titrate to effect  Continue oral narcotics for pain control.  Continue on Lyrica.  Continue basal bolus insulin, titrate to effect. Inc levemir and scheduled humalog on 6/1  Heating pad to neck and shoulder pain areas, pain improving  Continue Flexeril for muscle spasm, monitor for sedation  Continue wound care recommendations.    Continue PT/OT    Of care discussed with patient and patient nurse.  Patient has been declining steadily and not able to get up due to severe arthritis involving left hip and bilateral knees.  Awaiing rehab placement.  Difficult due to his past history and inability to do much therapy    DVT prophylaxis:  Medical DVT prophylaxis orders are present.  Home Eliquis    CODE STATUS:   Level Of Support Discussed With: Patient  Code Status (Patient has no pulse and is not breathing): CPR (Attempt to Resuscitate)  Medical Interventions (Patient has pulse or is breathing): Full Support    Electronically signed by Cayla Mcdonald MD, 06/03/23, 9:58 AM EDT.

## 2023-06-03 NOTE — PLAN OF CARE
Goal Outcome Evaluation:  Plan of Care Reviewed With: patient        Progress: no change  Outcome Evaluation: VS WNL, , C/O 8/10 PAIN, MEDICATED PER MAR. DENIES ANY NEW COMPLAINTS AT THIS TIME         Problem: Adult Inpatient Plan of Care  Goal: Plan of Care Review  Outcome: Unable to Meet, Plan Revised  Flowsheets (Taken 6/3/2023 3911)  Progress: no change  Plan of Care Reviewed With: patient  Outcome Evaluation: VS WNL, , C/O 8/10 PAIN, MEDICATED PER MAR. DENIES ANY NEW COMPLAINTS AT THIS TIME  Goal: Patient-Specific Goal (Individualized)  Outcome: Unable to Meet, Plan Revised  Goal: Absence of Hospital-Acquired Illness or Injury  Outcome: Unable to Meet, Plan Revised  Goal: Optimal Comfort and Wellbeing  Outcome: Unable to Meet, Plan Revised  Goal: Readiness for Transition of Care  Outcome: Unable to Meet, Plan Revised     Problem: Fall Injury Risk  Goal: Absence of Fall and Fall-Related Injury  Outcome: Unable to Meet, Plan Revised     Problem: Skin Injury Risk Increased  Goal: Skin Health and Integrity  Outcome: Unable to Meet, Plan Revised

## 2023-06-04 LAB
027 TOXIN: NORMAL
C DIFF TOX GENS STL QL NAA+PROBE: NEGATIVE
GLUCOSE BLDC GLUCOMTR-MCNC: 141 MG/DL (ref 70–99)
GLUCOSE BLDC GLUCOMTR-MCNC: 152 MG/DL (ref 70–99)
GLUCOSE BLDC GLUCOMTR-MCNC: 153 MG/DL (ref 70–99)
GLUCOSE BLDC GLUCOMTR-MCNC: 198 MG/DL (ref 70–99)

## 2023-06-04 PROCEDURE — 63710000001 INSULIN LISPRO (HUMAN) PER 5 UNITS: Performed by: INTERNAL MEDICINE

## 2023-06-04 PROCEDURE — 82948 REAGENT STRIP/BLOOD GLUCOSE: CPT

## 2023-06-04 PROCEDURE — 99231 SBSQ HOSP IP/OBS SF/LOW 25: CPT | Performed by: INTERNAL MEDICINE

## 2023-06-04 PROCEDURE — 94799 UNLISTED PULMONARY SVC/PX: CPT

## 2023-06-04 PROCEDURE — 63710000001 INSULIN DETEMIR PER 5 UNITS: Performed by: INTERNAL MEDICINE

## 2023-06-04 PROCEDURE — 63710000001 INSULIN LISPRO (HUMAN) PER 5 UNITS

## 2023-06-04 PROCEDURE — 87493 C DIFF AMPLIFIED PROBE: CPT | Performed by: PHYSICIAN ASSISTANT

## 2023-06-04 RX ORDER — LOPERAMIDE HYDROCHLORIDE 2 MG/1
4 CAPSULE ORAL ONCE
Status: COMPLETED | OUTPATIENT
Start: 2023-06-04 | End: 2023-06-04

## 2023-06-04 RX ORDER — LOPERAMIDE HYDROCHLORIDE 2 MG/1
2 CAPSULE ORAL 4 TIMES DAILY PRN
Status: DISCONTINUED | OUTPATIENT
Start: 2023-06-04 | End: 2023-06-15 | Stop reason: HOSPADM

## 2023-06-04 RX ORDER — SACCHAROMYCES BOULARDII 250 MG
250 CAPSULE ORAL 2 TIMES DAILY
Status: DISCONTINUED | OUTPATIENT
Start: 2023-06-04 | End: 2023-06-15 | Stop reason: HOSPADM

## 2023-06-04 RX ADMIN — METOPROLOL SUCCINATE 50 MG: 50 TABLET, EXTENDED RELEASE ORAL at 22:07

## 2023-06-04 RX ADMIN — INSULIN DETEMIR 45 UNITS: 100 INJECTION, SOLUTION SUBCUTANEOUS at 08:34

## 2023-06-04 RX ADMIN — INSULIN DETEMIR 45 UNITS: 100 INJECTION, SOLUTION SUBCUTANEOUS at 22:08

## 2023-06-04 RX ADMIN — PREGABALIN 25 MG: 25 CAPSULE ORAL at 22:07

## 2023-06-04 RX ADMIN — APIXABAN 5 MG: 5 TABLET, FILM COATED ORAL at 08:34

## 2023-06-04 RX ADMIN — AMOXICILLIN 1000 MG: 500 CAPSULE ORAL at 06:27

## 2023-06-04 RX ADMIN — DILTIAZEM HYDROCHLORIDE 120 MG: 120 CAPSULE, COATED, EXTENDED RELEASE ORAL at 08:34

## 2023-06-04 RX ADMIN — MIDODRINE HYDROCHLORIDE 5 MG: 5 TABLET ORAL at 12:51

## 2023-06-04 RX ADMIN — LOPERAMIDE HYDROCHLORIDE 2 MG: 2 CAPSULE ORAL at 22:11

## 2023-06-04 RX ADMIN — INSULIN LISPRO 2 UNITS: 100 INJECTION, SOLUTION INTRAVENOUS; SUBCUTANEOUS at 18:27

## 2023-06-04 RX ADMIN — APIXABAN 5 MG: 5 TABLET, FILM COATED ORAL at 22:07

## 2023-06-04 RX ADMIN — FAMOTIDINE 40 MG: 20 TABLET ORAL at 08:34

## 2023-06-04 RX ADMIN — METOPROLOL SUCCINATE 50 MG: 50 TABLET, EXTENDED RELEASE ORAL at 08:34

## 2023-06-04 RX ADMIN — MIDODRINE HYDROCHLORIDE 5 MG: 5 TABLET ORAL at 18:27

## 2023-06-04 RX ADMIN — ACETAMINOPHEN 1000 MG: 325 TABLET ORAL at 06:27

## 2023-06-04 RX ADMIN — AMOXICILLIN 1000 MG: 500 CAPSULE ORAL at 15:05

## 2023-06-04 RX ADMIN — INSULIN LISPRO 8 UNITS: 100 INJECTION, SOLUTION INTRAVENOUS; SUBCUTANEOUS at 18:27

## 2023-06-04 RX ADMIN — OXYCODONE HYDROCHLORIDE 10 MG: 5 TABLET ORAL at 19:46

## 2023-06-04 RX ADMIN — ATORVASTATIN CALCIUM 10 MG: 10 TABLET, FILM COATED ORAL at 22:07

## 2023-06-04 RX ADMIN — INSULIN LISPRO 2 UNITS: 100 INJECTION, SOLUTION INTRAVENOUS; SUBCUTANEOUS at 22:08

## 2023-06-04 RX ADMIN — MIDODRINE HYDROCHLORIDE 5 MG: 5 TABLET ORAL at 06:27

## 2023-06-04 RX ADMIN — PREGABALIN 25 MG: 25 CAPSULE ORAL at 15:05

## 2023-06-04 RX ADMIN — PREGABALIN 25 MG: 25 CAPSULE ORAL at 06:27

## 2023-06-04 RX ADMIN — DIGOXIN 125 MCG: 125 TABLET ORAL at 12:52

## 2023-06-04 RX ADMIN — Medication 250 MG: at 22:07

## 2023-06-04 RX ADMIN — OXYCODONE HYDROCHLORIDE 10 MG: 5 TABLET ORAL at 03:53

## 2023-06-04 RX ADMIN — ACETAMINOPHEN 1000 MG: 325 TABLET ORAL at 15:05

## 2023-06-04 RX ADMIN — OXYCODONE HYDROCHLORIDE 10 MG: 5 TABLET ORAL at 11:16

## 2023-06-04 RX ADMIN — Medication 250 MG: at 12:51

## 2023-06-04 RX ADMIN — Medication 10 MG: at 22:07

## 2023-06-04 RX ADMIN — LOPERAMIDE HYDROCHLORIDE 4 MG: 2 CAPSULE ORAL at 12:51

## 2023-06-04 RX ADMIN — AMOXICILLIN 1000 MG: 500 CAPSULE ORAL at 22:07

## 2023-06-04 RX ADMIN — Medication 10 ML: at 22:11

## 2023-06-04 RX ADMIN — CYCLOBENZAPRINE 10 MG: 10 TABLET, FILM COATED ORAL at 00:55

## 2023-06-04 RX ADMIN — ACETAMINOPHEN 1000 MG: 325 TABLET ORAL at 22:07

## 2023-06-04 NOTE — PLAN OF CARE
Goal Outcome Evaluation:      Patient complained of pain x1, medicated per orders, had a bath today, wound care completed per orders, vitals stable.

## 2023-06-04 NOTE — PROGRESS NOTES
[facility]   Hospitalist Progress Note  Date: 2023  Patient Name: Jose Shaikh  : 1960  MRN: [id number]  Date of admission: 2023      Subjective   Subjective     Chief Complaint: Left hip pain    Summary:   Jose Shaikh is a 62 y.o. male past medical history of osteomyelitis, type 2 diabetes, hypertension, A-fib, dyslipidemia, obesity with BMI of 48, who was recently hospital for osteomyelitis and he returns due to hip pain.      Patient was recently admitted to the hospital in early April for foot infection.  There was recommendation for amputation due to osteomyelitis in the foot and the patient refused.  He was unable to be placed at that time.  He is a sex offender making it very difficult for placement although [facility] was an option.  As result, he opted to go home on oral antibiotics to treat Alcaligenes facialis and Morganella morganii with doxycycline and ciprofloxacin.  The patient was seen in wound care in middle of April and at that time Dr. Daniels attempted to switch antibiotics to Levaquin per pharmacy recommendations based off culture data but was unable to get hold the patient so he was never switched.  The patient states the only reason he came to the emergency department was because his left hip was hurting.  Unaware of fevers.  Chills.  Cough.  Shortness of breath.  The patient came to the ER for hip pain.    Patient blood culture grew Streptococcus agalactiae.  Zyvox DC'd.  Patient does not want any amputation.  Patient wound culture from right foot also grew Streptococcus.  X-ray of pelvis and hip shows DJD of bilateral hips left worse than right.  Patient refused MRI of the left hip and order was canceled as patient is very claustrophobic.  Per ID recommendation needs 6 weeks of oral amoxicillin for osteomyelitis of right foot.  Patient is doing well, he is weak and debilitated however and Social work is arranging placement in a rehab facility for  patient    Interval Followup: Complains having had several loose bowel movements since yesterday.  C. difficile was ordered and was found to be negative.  Patient denies any abdominal pain nausea or vomiting.    Objective   Objective     Vitals:   Temp:  [97.5 °F (36.4 °C)-98.1 °F (36.7 °C)] 97.7 °F (36.5 °C)  Heart Rate:  [67-74] 70  Resp:  [16-20] 16  BP: (124-142)/(53-81) 137/68  Physical Exam      GEN: No acute distress, laying in the bed, resting comfortably  HEENT: Moist mucous membranes  LUNGS: Equal chest rise bilaterally  ABD: Soft nontender nondistended bowel sounds present.  CARDIAC: Regular rate and rhythm  NEURO: Moving all 4 extremities spontaneously, cranial 2-12 grossly intact, speech clear     Result Review    Result Review:  I have personally reviewed the results for the past 24 hours and agree with these findings:  [x]  Laboratory  [x]  Microbiology  []  Radiology  []  EKG/Telemetry   []  Cardiology/Vascular   []  Pathology  [x]  Old records  [x]  Other: Medications    Assessment & Plan   Assessment / Plan     Assessment:  Sepsis present on admission.  Patient presented with fever, tachycardia, lactic acidosis, elevated procalcitonin and leukocytosis.  Resolved  Right upper lobe healthcare associated pneumonia .  Streptococcus agalactiae bacteremia.  Subsequent cultures negative  Chronic osteomyelitis of right foot involving cuboid and maybe cuneiform.  On p.o. amoxicillin for 6 weeks  Cellulitis of right foot due to Streptococcus agalactiae.  Paroxysmal A-fib with intermittent RVR on Eliquis.  Heart rates have been well controlled  Bilateral diabetic foot ulcers.  NIDDM.  Most recent A1c 6.6 in April 2023  Morbid obesity BMI of 44.8, down from 48.1 on admission  S/p TMA of right foot.  Hyponatremia.  likely from hyperglycemia/volume overload.  Clinically significant.  Resolved.  Hypophosphatemia.  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  Chronic shoulder neck  pain  Hypertension.  Blood pressure stable.  Diarrhea most likely antibiotic associated    Plan:  May need psych eval as patient feeling hopeless due to not able to get up.  Patient refused MRI of left hip.  X-ray showed arthritis  Continue to fluid restriction for hyponatremia, eased to 2 L/day.    Right foot is likely source of bacteremia as per ID.  Wound culture and blood culture noted.  Repeat blood cultures final results with no growth.  CTX --> Amoxil with plans for 6 weeks of therapy for presumed OM from 5/13. Last dose 6/23  Appreciate podiatry input.  Per podiatry right foot wound not source of infection?  Patient does not want below-knee amputation.  Continue midodrine, monitor blood pressure  Continue p.o. digoxin  Continue long-acting p.o. Cardizem.    Continue home metoprolol at increased dose, titrate to effect  Continue oral narcotics for pain control.  Continue on Lyrica.  Continue basal bolus insulin, titrate to effect. Inc levemir and scheduled humalog on 6/1  Heating pad to neck and shoulder pain areas, pain improving  Continue Flexeril for muscle spasm, monitor for sedation  Continue wound care recommendations.    Continue PT/OT  We will add Imodium as needed.  We will also initiate probiotic.    Of care discussed with patient and patient nurse.  Patient has been declining steadily and not able to get up due to severe arthritis involving left hip and bilateral knees.  Awaiing rehab placement.  Difficult due to his past history and inability to do much therapy    DVT prophylaxis:  Medical DVT prophylaxis orders are present.  Home Eliquis    CODE STATUS:   Level Of Support Discussed With: Patient  Code Status (Patient has no pulse and is not breathing): CPR (Attempt to Resuscitate)  Medical Interventions (Patient has pulse or is breathing): Full Support    Electronically signed by Cayla Mcdonald MD, 06/04/23, 9:51 AM EDT.

## 2023-06-04 NOTE — PLAN OF CARE
Problem: Skin Injury Risk Increased  Goal: Skin Health and Integrity  6/4/2023 0204 by Selena Lindquist, RONNIE  Outcome: Ongoing, Not Progressing  6/4/2023 0204 by Selena Lindquist RN  Outcome: Ongoing, Not Progressing  Intervention: Promote and Optimize Oral Intake  Intervention: Optimize Skin Protection     Problem: Fall Injury Risk  Goal: Absence of Fall and Fall-Related Injury  6/4/2023 0204 by Selena Lindquist, RONNIE  Outcome: Ongoing, Not Progressing  6/4/2023 0204 by Selena Lindquist RN  Outcome: Ongoing, Not Progressing  Intervention: Identify and Manage Contributors  Intervention: Promote Injury-Free Environment   Goal Outcome Evaluation:

## 2023-06-04 NOTE — PLAN OF CARE
Problem: Skin Injury Risk Increased  Goal: Skin Health and Integrity  6/4/2023 0829 by Selena Lindquist, RN  Outcome: Ongoing, Not Progressing  6/4/2023 0204 by Selena Lindquist, RN  Outcome: Ongoing, Not Progressing  6/4/2023 0204 by Selena Lindquist, RN  Outcome: Ongoing, Not Progressing  Intervention: Promote and Optimize Oral Intake  Intervention: Optimize Skin Protection   Goal Outcome Evaluation:

## 2023-06-05 LAB
GLUCOSE BLDC GLUCOMTR-MCNC: 144 MG/DL (ref 70–99)
GLUCOSE BLDC GLUCOMTR-MCNC: 151 MG/DL (ref 70–99)
GLUCOSE BLDC GLUCOMTR-MCNC: 183 MG/DL (ref 70–99)
GLUCOSE BLDC GLUCOMTR-MCNC: 235 MG/DL (ref 70–99)

## 2023-06-05 PROCEDURE — 82948 REAGENT STRIP/BLOOD GLUCOSE: CPT

## 2023-06-05 PROCEDURE — 63710000001 INSULIN LISPRO (HUMAN) PER 5 UNITS: Performed by: INTERNAL MEDICINE

## 2023-06-05 PROCEDURE — 63710000001 INSULIN LISPRO (HUMAN) PER 5 UNITS

## 2023-06-05 PROCEDURE — 94799 UNLISTED PULMONARY SVC/PX: CPT

## 2023-06-05 PROCEDURE — 63710000001 INSULIN DETEMIR PER 5 UNITS: Performed by: INTERNAL MEDICINE

## 2023-06-05 PROCEDURE — 97164 PT RE-EVAL EST PLAN CARE: CPT

## 2023-06-05 PROCEDURE — 99232 SBSQ HOSP IP/OBS MODERATE 35: CPT | Performed by: INTERNAL MEDICINE

## 2023-06-05 RX ADMIN — ACETAMINOPHEN 1000 MG: 325 TABLET ORAL at 14:34

## 2023-06-05 RX ADMIN — MIDODRINE HYDROCHLORIDE 5 MG: 5 TABLET ORAL at 17:39

## 2023-06-05 RX ADMIN — INSULIN DETEMIR 45 UNITS: 100 INJECTION, SOLUTION SUBCUTANEOUS at 08:11

## 2023-06-05 RX ADMIN — APIXABAN 5 MG: 5 TABLET, FILM COATED ORAL at 08:11

## 2023-06-05 RX ADMIN — Medication 250 MG: at 21:09

## 2023-06-05 RX ADMIN — METOPROLOL SUCCINATE 50 MG: 50 TABLET, EXTENDED RELEASE ORAL at 08:10

## 2023-06-05 RX ADMIN — INSULIN DETEMIR 45 UNITS: 100 INJECTION, SOLUTION SUBCUTANEOUS at 21:07

## 2023-06-05 RX ADMIN — APIXABAN 5 MG: 5 TABLET, FILM COATED ORAL at 21:09

## 2023-06-05 RX ADMIN — INSULIN LISPRO 2 UNITS: 100 INJECTION, SOLUTION INTRAVENOUS; SUBCUTANEOUS at 17:38

## 2023-06-05 RX ADMIN — AMOXICILLIN 1000 MG: 500 CAPSULE ORAL at 21:09

## 2023-06-05 RX ADMIN — OXYCODONE HYDROCHLORIDE 10 MG: 5 TABLET ORAL at 03:04

## 2023-06-05 RX ADMIN — PREGABALIN 25 MG: 25 CAPSULE ORAL at 05:51

## 2023-06-05 RX ADMIN — ACETAMINOPHEN 1000 MG: 325 TABLET ORAL at 05:51

## 2023-06-05 RX ADMIN — AMOXICILLIN 1000 MG: 500 CAPSULE ORAL at 14:34

## 2023-06-05 RX ADMIN — PREGABALIN 25 MG: 25 CAPSULE ORAL at 21:08

## 2023-06-05 RX ADMIN — AMOXICILLIN 1000 MG: 500 CAPSULE ORAL at 05:51

## 2023-06-05 RX ADMIN — Medication 250 MG: at 08:14

## 2023-06-05 RX ADMIN — DILTIAZEM HYDROCHLORIDE 120 MG: 120 CAPSULE, COATED, EXTENDED RELEASE ORAL at 08:10

## 2023-06-05 RX ADMIN — MIDODRINE HYDROCHLORIDE 5 MG: 5 TABLET ORAL at 08:10

## 2023-06-05 RX ADMIN — INSULIN LISPRO 2 UNITS: 100 INJECTION, SOLUTION INTRAVENOUS; SUBCUTANEOUS at 12:31

## 2023-06-05 RX ADMIN — INSULIN LISPRO 8 UNITS: 100 INJECTION, SOLUTION INTRAVENOUS; SUBCUTANEOUS at 17:39

## 2023-06-05 RX ADMIN — METOPROLOL SUCCINATE 50 MG: 50 TABLET, EXTENDED RELEASE ORAL at 21:09

## 2023-06-05 RX ADMIN — ACETAMINOPHEN 1000 MG: 325 TABLET ORAL at 22:32

## 2023-06-05 RX ADMIN — INSULIN LISPRO 8 UNITS: 100 INJECTION, SOLUTION INTRAVENOUS; SUBCUTANEOUS at 08:11

## 2023-06-05 RX ADMIN — PREGABALIN 25 MG: 25 CAPSULE ORAL at 14:34

## 2023-06-05 RX ADMIN — INSULIN LISPRO 8 UNITS: 100 INJECTION, SOLUTION INTRAVENOUS; SUBCUTANEOUS at 12:31

## 2023-06-05 RX ADMIN — FAMOTIDINE 40 MG: 20 TABLET ORAL at 08:10

## 2023-06-05 RX ADMIN — MIDODRINE HYDROCHLORIDE 5 MG: 5 TABLET ORAL at 12:31

## 2023-06-05 RX ADMIN — INSULIN LISPRO 3 UNITS: 100 INJECTION, SOLUTION INTRAVENOUS; SUBCUTANEOUS at 21:07

## 2023-06-05 RX ADMIN — DIGOXIN 125 MCG: 125 TABLET ORAL at 12:31

## 2023-06-05 RX ADMIN — ATORVASTATIN CALCIUM 10 MG: 10 TABLET, FILM COATED ORAL at 21:09

## 2023-06-05 RX ADMIN — OXYCODONE HYDROCHLORIDE 10 MG: 5 TABLET ORAL at 21:08

## 2023-06-05 NOTE — SIGNIFICANT NOTE
Wound Eval / Progress Noted    KEO Dominguez     Patient Name: Jose Shaikh  : 1960  MRN: [id number]  Today's Date: 2023                 Admit Date: 2023    Visit Dx:    ICD-10-CM ICD-9-CM   1. Acute febrile illness  R50.9 780.60   2. Sepsis without acute organ dysfunction, due to unspecified organism  A41.9 038.9     995.91   3. Pneumonia of right upper lobe due to infectious organism  J18.9 486   4. Uncontrolled type 2 diabetes mellitus with hyperglycemia  E11.65 250.02   5. Atrial fibrillation with rapid ventricular response  I48.91 427.31   6. Acute osteomyelitis of right foot  M86.171 730.07   7. Decreased activities of daily living (ADL)  Z78.9 V49.89   8. Difficulty in walking  R26.2 719.7       Patient Active Problem List   Diagnosis    Diabetic ulcer of left heel associated with type 2 DM    Acute osteomyelitis of left calcaneus     Diabetic ulcer of left heel associated with type 2 DM    Diabetic ulcer of right midfoot associated with type 2 DM    Paroxysmal atrial fibrillation    Essential hypertension    Hyperlipidemia LDL goal <100    Cellulitis and abscess of foot    High alkaline phosphatase level    Osteomyelitis    Onychomycosis    Onychocryptosis    Foot pain, bilateral    Osteomyelitis of foot, right, acute    Cellulitis of right foot    Type 2 diabetes mellitus, with long-term current use of insulin    Class 3 severe obesity due to excess calories with serious comorbidity and body mass index (BMI) of 45.0 to 49.9 in adult    Anxiety disorder, unspecified    Claustrophobia    Dependence on wheelchair    Depression, unspecified    Long term (current) use of anticoagulants    Long term (current) use of oral hypoglycemic drugs    Wound of foot    Non-prs chronic ulcer oth prt r foot limited to brkdwn skin    Orthostatic hypotension    Other chronic osteomyelitis, right ankle and foot    Personal history of nicotine dependence    Thrombocytopenia, unspecified    Unspecified open wound,  right foot, initial encounter    Diabetic foot infection    Subacute osteomyelitis of right foot    Right foot pain    Sepsis    Onychomycosis    Foot pain, left        Past Medical History:   Diagnosis Date    Absence of toe of right foot     Acute osteomyelitis of left calcaneus  8/18/2021    Anxiety and depression     Arthritis     Claustrophobia     Corns and callus     Diabetic ulcer of left heel associated with type 2 DM 8/18/2021    Diabetic ulcer of left heel associated with type 2 DM 7/6/2021    Diabetic ulcer of right midfoot associated with type 2 DM 8/18/2021    Difficulty walking     Essential hypertension 8/31/2021    Hammertoe     Hyperlipidemia LDL goal <100 8/31/2021    Ingrown toenail     Obesity     Paroxysmal atrial fibrillation 8/31/2021    Polyneuropathy     Pressure ulcer, stage 1     Tinea unguium     Type 2 diabetes mellitus with polyneuropathy         Past Surgical History:   Procedure Laterality Date    CYST REMOVAL      center of back; benign    INCISION AND DRAINAGE ABSCESS      back    INCISION AND DRAINAGE LEG Right 12/10/2021    Procedure: INCISION AND DRAINAGE LOWER EXTREMITY;  Surgeon: Ash Leyva DPM;  Location: [facility];  Service: Podiatry;  Laterality: Right;    OTHER SURGICAL HISTORY      Surgical clips left foot    TOE SURGERY Right     Removal of 5th toe    TRANS METATARSAL AMPUTATION Right 12/2/2021    Procedure: AMPUTATION TRANS METATARSAL;  Surgeon: Ash Leyva DPM;  Location: [facility];  Service: Podiatry;  Laterality: Right;    WRIST SURGERY Left     repair of injury         Physical Assessment:     06/05/23 1125   Wound 12/02/21 Right anterior foot Incision   Placement Date: 12/02/21   Side: Right  Orientation: anterior  Location: foot  Primary Wound Type: Incision   Wound Image    Dressing Appearance intact;moist drainage   Closure None   Base moist;red;dry;scab   Periwound dry   Periwound Temperature warm   Periwound Skin Turgor firm    Edges callused;irregular;open   Wound Length (cm) 1.5 cm  (left medial aspect 1.4cm)   Wound Width (cm) 2 cm  (left medial aspect 1.4cm)   Wound Depth (cm) 0.1 cm  (left medial aspect 0.2cm)   Wound Surface Area (cm^2) 3 cm^2   Wound Volume (cm^3) 0.3 cm^3   Drainage Characteristics/Odor serosanguineous   Drainage Amount small   Care, Wound cleansed with;irrigated with;sterile normal saline   Dressing Care dressing applied;dressing moistened;gauze, dry;elastic bandage  (betadine moistened gauze)   Periwound Care absorptive dressing applied   Wound 06/22/21 1133 Left lateral heel   Placement Date/Time: 06/22/21 1133   Present on Hospital Admission: Yes  Side: Left  Orientation: lateral  Location: heel   Wound Image    Dressing Appearance intact;moist drainage   Closure None   Base moist;pink;red   Periwound dry   Periwound Temperature warm   Periwound Skin Turgor firm   Edges open;callused   Wound Length (cm) 3.7 cm   Wound Width (cm) 1.8 cm   Wound Depth (cm) 1 cm   Wound Surface Area (cm^2) 6.66 cm^2   Wound Volume (cm^3) 6.66 cm^3   Drainage Characteristics/Odor serous   Drainage Amount none   Care, Wound cleansed with;sterile normal saline   Dressing Care dressing applied;dressing moistened;packed with;gauze, dry  (betadine moistened gauze)   Periwound Care absorptive dressing applied          Wound Check / Follow-up:  Patient seen today for wound follow-up. Patient has chronic wounds to right amputation site. He has thickened callus formation with open ulceration to lateral aspect with red moist tissue to base with sero-sanguinous drainage. Patient also has thickened callused ulceration to lateral aspect of foot with no visible moist wound bed only dried thickened tissue. Cleansed foot with NS and gauze. Blotted dry. Moisturizer applied to dry skin. Betadine moistened fluffed gauze applied to open tissue and dried ulceration and then covered with dry gauze and secured with gauze roll and elastic wrap.    Patient also with chronic wound to left heel. He has thickened callus formation that partially lifts from base with split at 6 o'clock area. Base is red and moist. Cleansed and irrigated wound with NS and cleansed yonis-wound with NS and gauze. Moisturizer applied to dry skin around wound and then open wound filled with betadine moistened gauze and then covered with dry gauze and secured with gauze roll.   Requested to assess gluteal aspects. Patient states it is good and was assessed by primary RN. Need for further assessment declined at this time. Per primary RN, it is improving with only visible crusting but she will attempt to obtain a new photo for further review.   Crusting noted to left knee. Patient states it was from a traumatic injury with a cab door.     Impression: Chronic wounds to right foot amputation site and left heel    Short term goals:  Regain skin integrity. Daily dressing changes. Skin protection, moisture prevention and pressure reduction.    Bettye Pope RN    6/5/2023    14:53 EDT

## 2023-06-05 NOTE — PLAN OF CARE
Goal Outcome Evaluation:         Pt aox4, Vs stable. Prn oxycodone given per mar for left hip/knee pain. Pt given immodium for diarrhea which has improved. Wound care performed as ordered. No new issues at this time .    Pt requests all 4 bedrails up, educated on restraints. Pt v/u and pt still requests all 4 rails.

## 2023-06-05 NOTE — THERAPY RE-EVALUATION
Acute Care - Physical Therapy Re-Evaluation   Angelica     Patient Name: Jose Shaikh  : 1960  MRN: [id number]  Today's Date: 2023      Visit Dx:     ICD-10-CM ICD-9-CM   1. Acute febrile illness  R50.9 780.60   2. Sepsis without acute organ dysfunction, due to unspecified organism  A41.9 038.9     995.91   3. Pneumonia of right upper lobe due to infectious organism  J18.9 486   4. Uncontrolled type 2 diabetes mellitus with hyperglycemia  E11.65 250.02   5. Atrial fibrillation with rapid ventricular response  I48.91 427.31   6. Acute osteomyelitis of right foot  M86.171 730.07   7. Decreased activities of daily living (ADL)  Z78.9 V49.89   8. Difficulty in walking  R26.2 719.7     Patient Active Problem List   Diagnosis    Diabetic ulcer of left heel associated with type 2 DM    Acute osteomyelitis of left calcaneus     Diabetic ulcer of left heel associated with type 2 DM    Diabetic ulcer of right midfoot associated with type 2 DM    Paroxysmal atrial fibrillation    Essential hypertension    Hyperlipidemia LDL goal <100    Cellulitis and abscess of foot    High alkaline phosphatase level    Osteomyelitis    Onychomycosis    Onychocryptosis    Foot pain, bilateral    Osteomyelitis of foot, right, acute    Cellulitis of right foot    Type 2 diabetes mellitus, with long-term current use of insulin    Class 3 severe obesity due to excess calories with serious comorbidity and body mass index (BMI) of 45.0 to 49.9 in adult    Anxiety disorder, unspecified    Claustrophobia    Dependence on wheelchair    Depression, unspecified    Long term (current) use of anticoagulants    Long term (current) use of oral hypoglycemic drugs    Wound of foot    Non-prs chronic ulcer oth prt r foot limited to brkdwn skin    Orthostatic hypotension    Other chronic osteomyelitis, right ankle and foot    Personal history of nicotine dependence    Thrombocytopenia, unspecified    Unspecified open wound, right foot, initial  encounter    Diabetic foot infection    Subacute osteomyelitis of right foot    Right foot pain    Sepsis    Onychomycosis    Foot pain, left     Past Medical History:   Diagnosis Date    Absence of toe of right foot     Acute osteomyelitis of left calcaneus  8/18/2021    Anxiety and depression     Arthritis     Claustrophobia     Corns and callus     Diabetic ulcer of left heel associated with type 2 DM 8/18/2021    Diabetic ulcer of left heel associated with type 2 DM 7/6/2021    Diabetic ulcer of right midfoot associated with type 2 DM 8/18/2021    Difficulty walking     Essential hypertension 8/31/2021    Hammertoe     Hyperlipidemia LDL goal <100 8/31/2021    Ingrown toenail     Obesity     Paroxysmal atrial fibrillation 8/31/2021    Polyneuropathy     Pressure ulcer, stage 1     Tinea unguium     Type 2 diabetes mellitus with polyneuropathy      Past Surgical History:   Procedure Laterality Date    CYST REMOVAL      center of back; benign    INCISION AND DRAINAGE ABSCESS      back    INCISION AND DRAINAGE LEG Right 12/10/2021    Procedure: INCISION AND DRAINAGE LOWER EXTREMITY;  Surgeon: Ash Leyva DPM;  Location: [facility];  Service: Podiatry;  Laterality: Right;    OTHER SURGICAL HISTORY      Surgical clips left foot    TOE SURGERY Right     Removal of 5th toe    TRANS METATARSAL AMPUTATION Right 12/2/2021    Procedure: AMPUTATION TRANS METATARSAL;  Surgeon: Ash Leyva DPM;  Location: [facility] OR;  Service: Podiatry;  Laterality: Right;    WRIST SURGERY Left     repair of injury     PT Assessment (last 12 hours)       PT Evaluation and Treatment       Row Name 06/05/23 1500          Physical Therapy Time and Intention    Subjective Information no complaints (P)   -     Document Type re-evaluation (P)   -     Mode of Treatment individual therapy;physical therapy (P)   -     Patient Effort adequate (P)   -     Symptoms Noted During/After Treatment none (P)   -        Row Name 06/05/23 1500          General Information    Patient Profile Reviewed yes (P)   -     Patient Observations alert;cooperative;agree to therapy (P)   -       Row Name 06/05/23 1500          Motor Skills    Therapeutic Exercise hip;knee;ankle (P)   -       Row Name 06/05/23 1500          Hip (Therapeutic Exercise)    Hip (Therapeutic Exercise) AROM (active range of motion) (P)   -     Hip AROM (Therapeutic Exercise) bilateral;flexion;20 repititions (P)   -       Row Name 06/05/23 1500          Knee (Therapeutic Exercise)    Knee (Therapeutic Exercise) AROM (active range of motion) (P)   -     Knee AROM (Therapeutic Exercise) bilateral;SAQ (short arc quad);SLR (straight leg raise);heel slides;2 sets;10 repetitions (P)   -       Row Name 06/05/23 1500          Ankle (Therapeutic Exercise)    Ankle (Therapeutic Exercise) AROM (active range of motion) (P)   -     Ankle AROM (Therapeutic Exercise) left;dorsiflexion;plantarflexion;20 repititions (P)   -       Row Name             Wound 12/02/21 Right anterior foot Incision    Wound - Properties Group Placement Date: 12/02/21  -ES Side: Right  -ES Orientation: anterior  -ES Location: foot  -ES Primary Wound Type: Incision  -ES    Retired Wound - Properties Group Placement Date: 12/02/21  -ES Side: Right  -ES Orientation: anterior  -ES Location: foot  -ES Primary Wound Type: Incision  -ES    Retired Wound - Properties Group Date first assessed: 12/02/21  -ES Side: Right  -ES Location: foot  -ES Primary Wound Type: Incision  -ES      Row Name             Wound 06/22/21 1133 Left lateral heel    Wound - Properties Group Placement Date: 06/22/21  -FABIOLA Placement Time: 1133  -FABIOLA Present on Hospital Admission: Y  -FABIOLA Side: Left  -FABIOLA Orientation: lateral  -FABIOLA Location: heel  -FABIOLA    Retired Wound - Properties Group Placement Date: 06/22/21  -FABIOLA Placement Time: 1133  -FABIOLA Present on Hospital Admission: Y  -FABIOLA Side: Left  -FABIOLA Orientation: lateral  -FABIOLA Location:  heel  -FABIOLA    Retired Wound - Properties Group Date first assessed: 06/22/21  -FABIOLA Time first assessed: 1133  -FABIOLA Present on Hospital Admission: Y  -FABIOLA Side: Left  -FABIOLA Location: heel  -FABIOLA      Row Name             Wound 05/19/23 1317 gluteal Blisters    Wound - Properties Group Placement Date: 05/19/23  -RASHARD Placement Time: 1317 -RASHARD Present on Hospital Admission: N  -RASHARD Location: gluteal  -RASHARD Primary Wound Type: Blisters  -RASHARD    Retired Wound - Properties Group Placement Date: 05/19/23  -RASHARD Placement Time: 1317  -RASHARD Present on Hospital Admission: N  -RASHARD Location: gluteal  -RASHARD Primary Wound Type: Blisters  -RASHARD    Retired Wound - Properties Group Date first assessed: 05/19/23  -RASHARD Time first assessed: 1317 -RASHARD Present on Hospital Admission: N  -RASHARD Location: gluteal  -RASHARD Primary Wound Type: Blisters  -RASHARD      Row Name 06/05/23 1500          Positioning and Restraints    Pre-Treatment Position in bed (P)   -     Post Treatment Position bed (P)   -     In Bed call light within reach;exit alarm on (P)   -       Row Name 06/05/23 1500          Progress Summary (PT)    Progress Toward Functional Goals (PT) progress toward functional goals is fair (P)   -     Daily Progress Summary (PT) Pt presents with decreased strength and endurance in BLE. Pt only agreed to bed exercises due to weightbearing percautions he states the doctor gave him due to ulcers on both feet. Pt will continue to benefit from skilled physical therapy in order to increase transfer and bed mobility independence. Goals updated as necessary. (P)   -       Row Name 06/05/23 1500          Physical Therapy Goals    Bed Mobility Goal Selection (PT) bed mobility, PT goal 1 (P)   -     Transfer Goal Selection (PT) transfer, PT goal 1 (P)   -       Row Name 06/05/23 1500          Bed Mobility Goal 1 (PT)    Activity/Assistive Device (Bed Mobility Goal 1, PT) bed mobility activities, all (P)   -     [place] Level/Cues Needed (Bed Mobility Goal 1,  PT) minimum assist (75% or more patient effort) (P)   -JH     Time Frame (Bed Mobility Goal 1, PT) 10 days (P)   -JH     Progress/Outcomes (Bed Mobility Goal 1, PT) progress slower than expected (P)   -       Row Name 06/05/23 1500          Transfer Goal 1 (PT)    Activity/Assistive Device (Transfer Goal 1, PT) bed-to-chair/chair-to-bed;sliding board (P)   -     [place] Level/Cues Needed (Transfer Goal 1, PT) maximum assist (25-49% patient effort) (P)   -JH     Time Frame (Transfer Goal 1, PT) long term goal (LTG);10 days (P)   -JH     Progress/Outcome (Transfer Goal 1, PT) progress slower than expected (P)   -               User Key  (r) = Recorded By, (t) = Taken By, (c) = Cosigned By      Initials Name Provider Type    Karon Jordan, RN Registered Nurse    Marlen Vaughn, RN Registered Nurse    Katlyn Garcia RN Registered Nurse    Aguilar Valladares, PT Physical Therapist                      PT Recommendation and Plan     Progress Summary (PT)  Progress Toward Functional Goals (PT): (P) progress toward functional goals is fair  Daily Progress Summary (PT): (P) Pt presents with decreased strength and endurance in BLE. Pt only agreed to bed exercises due to weightbearing percautions he states the doctor gave him due to ulcers on both feet. Pt will continue to benefit from skilled physical therapy in order to increase transfer and bed mobility independence. Goals updated as necessary.   Outcome Measures       Row Name 06/05/23 1500             How much help from another person do you currently need...    Turning from your back to your side while in flat bed without using bedrails? 3 (P)   -JH      Moving from lying on back to sitting on the side of a flat bed without bedrails? 2 (P)   -JH      Moving to and from a bed to a chair (including a wheelchair)? 2 (P)   -JH      Standing up from a chair using your arms (e.g., wheelchair, bedside chair)? 2 (P)   -JH      Climbing 3-5 steps with a  railing? 1 (P)   -      To walk in hospital room? 1 (P)   -      AM-PAC 6 Clicks Score (PT) 11 (P)   -         Functional Assessment    Outcome Measure Options AM-PAC 6 Clicks Basic Mobility (PT) (P)   -                User Key  (r) = Recorded By, (t) = Taken By, (c) = Cosigned By      Initials Name Provider Type    Aguilar Valladares, MERLIN Physical Therapist                     Time Calculation:    PT Charges       Row Name 06/05/23 1532             Untimed Charges    PT Eval/Re-eval Minutes 25 (P)   -         Total Minutes    Untimed Charges Total Minutes 25 (P)   -       Total Minutes 25 (P)   -                User Key  (r) = Recorded By, (t) = Taken By, (c) = Cosigned By      Initials Name Provider Type    Aguilar Valladares PT Physical Therapist                  Therapy Charges for Today       Code Description Service Date Service Provider Modifiers Qty    [id number] HC PT RE-EVAL ESTABLISHED PLAN 2 6/5/2023 Aguilar Scott, MERLIN GP 1            PT G-Codes  Outcome Measure Options: (P) AM-PAC 6 Clicks Basic Mobility (PT)  AM-PAC 6 Clicks Score (PT): (P) 11  AM-PAC 6 Clicks Score (OT): 14    Aguilar Scott PT  6/5/2023

## 2023-06-05 NOTE — PLAN OF CARE
Goal Outcome Evaluation:         Patient had no complaints of pain or discomfort at this time, vitals stable, wound care completed per WOC nurse.

## 2023-06-05 NOTE — PROGRESS NOTES
[facility]   Hospitalist Progress Note  Date: 2023  Patient Name: Jose Shaikh  : 1960  MRN: [id number]  Date of admission: 2023      Subjective   Subjective     Chief Complaint: Left hip pain    Summary:   Jose Shaikh is a 62 y.o. male past medical history of osteomyelitis, type 2 diabetes, hypertension, A-fib, dyslipidemia, obesity with BMI of 48, who was recently hospital for osteomyelitis and he returns due to hip pain.      Patient was recently admitted to the hospital in early April for foot infection.  There was recommendation for amputation due to osteomyelitis in the foot and the patient refused.  He was unable to be placed at that time.  He is a sex offender making it very difficult for placement although [facility] was an option.  As result, he opted to go home on oral antibiotics to treat Alcaligenes facialis and Morganella morganii with doxycycline and ciprofloxacin.  The patient was seen in wound care in middle of April and at that time Dr. Daniels attempted to switch antibiotics to Levaquin per pharmacy recommendations based off culture data but was unable to get hold the patient so he was never switched.  The patient states the only reason he came to the emergency department was because his left hip was hurting.  Unaware of fevers.  Chills.  Cough.  Shortness of breath.  The patient came to the ER for hip pain.    Patient blood culture grew Streptococcus agalactiae.  Zyvox DC'd.  Patient does not want any amputation.  Patient wound culture from right foot also grew Streptococcus.  X-ray of pelvis and hip shows DJD of bilateral hips left worse than right.  Patient refused MRI of the left hip and order was canceled as patient is very claustrophobic.  Per ID recommendation needs 6 weeks of oral amoxicillin for osteomyelitis of right foot.  Patient is doing well, he is weak and debilitated however and Social work is arranging placement in a rehab facility for  patient    Interval Followup: Patient resting comfortably in bed, patient remains weak and debilitated, social work is searching for rehab center however difficult based off of his social situation  Objective   Objective     Vitals:   Temp:  [97.5 °F (36.4 °C)-97.9 °F (36.6 °C)] 97.5 °F (36.4 °C)  Heart Rate:  [61-67] 61  Resp:  [16-18] 16  BP: ()/(40-68) 119/63  Physical Exam      GEN: No acute distress resting comfortably in bed  HEENT: Membranes moist  LUNGS: Equal chest rise bilaterally  ABD: Obese abdomen  CARDIAC: Regular rate and rhythm  NEURO: Moving all 4 extremities spontaneously     Result Review    Result Review:  I have personally reviewed the results for the past 24 hours and agree with these findings:  [x]  Laboratory  [x]  Microbiology  []  Radiology  []  EKG/Telemetry   []  Cardiology/Vascular   []  Pathology  [x]  Old records  [x]  Other: Medications    Assessment & Plan   Assessment / Plan     Assessment:  Sepsis present on admission.  Patient presented with fever, tachycardia, lactic acidosis, elevated procalcitonin and leukocytosis.  Resolved  Right upper lobe healthcare associated pneumonia .  Streptococcus agalactiae bacteremia.  Subsequent cultures negative  Chronic osteomyelitis of right foot involving cuboid and maybe cuneiform.  On p.o. amoxicillin for 6 weeks  Cellulitis of right foot due to Streptococcus agalactiae.  Paroxysmal A-fib with intermittent RVR on Eliquis.  Heart rates have been well controlled  Bilateral diabetic foot ulcers.  NIDDM.  Most recent A1c 6.6 in April 2023  Morbid obesity BMI of 44.8, down from 48.1 on admission  S/p TMA of right foot.  Hyponatremia.  likely from hyperglycemia/volume overload.  Clinically significant.  Resolved.  Hypophosphatemia.  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  Chronic shoulder neck pain  Hypertension.  Blood pressure stable.  Diarrhea most likely antibiotic associated    Plan:  May need psych eval as patient  feeling hopeless due to not able to get up.  Patient refused MRI of left hip.  X-ray showed arthritis  Continue 2 L fluid restriction  Right foot is likely source of bacteremia as per ID.  Wound culture and blood culture noted.  Repeat blood cultures final results with no growth.  CTX --> Amoxil with plans for 6 weeks of therapy for presumed OM from 5/13. Last dose 6/23  Appreciate podiatry input.  Per podiatry right foot wound not source of infection?  Patient does not want below-knee amputation.  Continue midodrine, monitor blood pressure  Continue p.o. digoxin  Continue long-acting p.o. Cardizem.    Continue home metoprolol at increased dose, titrate to effect  Continue oral narcotics for pain control.  Continue on Lyrica.  Continue basal bolus insulin, titrate to effect. Inc levemir and scheduled humalog on 6/1  Heating pad to neck and shoulder pain areas, pain improving  Continue Flexeril for muscle spasm, monitor for sedation  Continue wound care recommendations.    Continue PT/OT  We will add Imodium as needed.  We will also initiate probiotic.    Of care discussed with patient and patient nurse.  Patient has been declining steadily and not able to get up due to severe arthritis involving left hip and bilateral knees.  Awaiing rehab placement.  Difficult due to his past history and inability to do much therapy    DVT prophylaxis:  Medical DVT prophylaxis orders are present.  Home Eliquis    CODE STATUS:   Level Of Support Discussed With: Patient  Code Status (Patient has no pulse and is not breathing): CPR (Attempt to Resuscitate)  Medical Interventions (Patient has pulse or is breathing): Full Support      Electronically signed by Max Mehta MD, 06/05/23, 12:26 PM EDT.

## 2023-06-05 NOTE — CONSULTS
"Nutrition Services    Patient Name: Jose Shaikh  YOB: 1960  MRN: [id number]  Admission date: 5/12/2023      CLINICAL NUTRITION ASSESSMENT      Reason for Assessment  Follow-up protocol   H&P:    Past Medical History:   Diagnosis Date    Absence of toe of right foot     Acute osteomyelitis of left calcaneus  8/18/2021    Anxiety and depression     Arthritis     Claustrophobia     Corns and callus     Diabetic ulcer of left heel associated with type 2 DM 8/18/2021    Diabetic ulcer of left heel associated with type 2 DM 7/6/2021    Diabetic ulcer of right midfoot associated with type 2 DM 8/18/2021    Difficulty walking     Essential hypertension 8/31/2021    Hammertoe     Hyperlipidemia LDL goal <100 8/31/2021    Ingrown toenail     Obesity     Paroxysmal atrial fibrillation 8/31/2021    Polyneuropathy     Pressure ulcer, stage 1     Tinea unguium     Type 2 diabetes mellitus with polyneuropathy         Current Problems:   Active Hospital Problems    Diagnosis     **Sepsis     Onychomycosis     Foot pain, left     Right foot pain     Osteomyelitis of foot, right, acute     Wound of foot     Diabetic ulcer of right midfoot associated with type 2 DM         Nutrition/Diet History         Narrative     Nutrition follow up.   61 yo Male admitted with sepsis - R upper lobe pneumonia and a significant diabetic ulcer on left lower extremity with amputations to right foot.  Fluid filled blisters noted to gluteal area.    Patient feeds self 100% Heart Healthy Diet, Consistent Carbohydrates/ regular texture with a 2000 ml fluid restriction. Weight fluctuations related to fluid volume changes.    BMI 43.8 Obese  Pt is 188% of #    MST score = 0    Nutrition Acuity Score = 10 Moderate Risk for Decline    RD will continue to follow and monitor per protocol.     Anthropometrics        Current Height, Weight Height: 188 cm (74\")  Weight: (!) 155 kg (341 lb 9.6 oz)   Current BMI Body mass index is 43.86 " kg/m².       Weight Hx  Wt Readings from Last 30 Encounters:   06/05/23 0445 (!) 155 kg (341 lb 9.6 oz)   06/05/23 0348 (!) 158 kg (349 lb 3.3 oz)   06/04/23 0555 (!) 157 kg (346 lb 3.2 oz)   06/03/23 0539 (!) 158 kg (349 lb)   06/02/23 0548 (!) 163 kg (359 lb 3.2 oz)   06/01/23 0600 (!) 164 kg (362 lb)   05/31/23 0507 (!) 164 kg (362 lb 4.8 oz)   05/30/23 0635 (!) 164 kg (362 lb 7 oz)   05/29/23 0500 (!) 167 kg (368 lb 2.7 oz)   05/28/23 0600 (!) 167 kg (367 lb 11.6 oz)   05/27/23 0600 (!) 167 kg (369 lb 0.8 oz)   05/26/23 0529 (!) 167 kg (369 lb 3.2 oz)   05/25/23 0600 (!) 168 kg (370 lb 3.2 oz)   05/24/23 0600 (!) 166 kg (366 lb 9.6 oz)   05/22/23 0529 (!) 169 kg (373 lb 7.4 oz)   05/21/23 0600 (!) 169 kg (371 lb 12.8 oz)   05/20/23 0600 (!) 171 kg (376 lb 5.2 oz)   05/19/23 0300 (!) 168 kg (370 lb 14.4 oz)   05/18/23 1912 (!) 168 kg (371 lb 7.6 oz)   05/18/23 0600 (!) 169 kg (371 lb 11.1 oz)   05/16/23 0700 (!) 171 kg (377 lb 4.8 oz)   05/14/23 0500 (!) 171 kg (377 lb 3.3 oz)   05/12/23 1143 (!) 170 kg (375 lb)   05/06/23 0258 (!) 170 kg (375 lb 8 oz)   04/19/23 0909 (!) 163 kg (359 lb)   04/03/23 1906 (!) 168 kg (370 lb)   03/27/23 0938 (!) 170 kg (373 lb 10.9 oz)   03/17/23 1153 (!) 168 kg (370 lb)   01/27/23 1501 (!) 168 kg (370 lb)   12/22/22 1501 (!) 171 kg (376 lb)   11/08/22 1035 (!) 161 kg (355 lb)   10/01/22 1141 (!) 164 kg (360 lb 10.8 oz)   05/18/22 1311 (!) 155 kg (341 lb)   03/24/22 1432 (!) 155 kg (341 lb)   03/02/22 1412 (!) 155 kg (341 lb)   01/12/22 1317 (!) 155 kg (341 lb)   12/30/21 1431 (!) 155 kg (341 lb)   12/01/21 1144 (!) 155 kg (341 lb 11.4 oz)   12/01/21 0843 (!) 157 kg (346 lb)   11/22/21 0839 (!) 157 kg (346 lb)   11/15/21 1148 (!) 157 kg (346 lb 12.5 oz)   11/09/21 1139 (!) 157 kg (345 lb 7.4 oz)   11/05/21 1130 (!) 159 kg (351 lb 3.1 oz)   11/02/21 1121 (!) 161 kg (356 lb)   10/27/21 1048 (!) 161 kg (356 lb)   10/21/21 1418 (!) 162 kg (356 lb 14.8 oz)   10/20/21 1120 (!) 160 kg (353  lb)   10/12/21 1240 (!) 160 kg (353 lb 13.4 oz)   10/06/21 1035 (!) 161 kg (354 lb)   09/29/21 1339 (!) 166 kg (365 lb 15.4 oz)   09/29/21 0857 (!) 166 kg (365 lb)   09/23/21 1333 (!) 166 kg (365 lb 1.3 oz)            Wt Change Observation Weight is down 34# in 30 days (9%).  Wt loss and gain is fluid related.       Estimated/Assessed Needs       Energy Requirements 25 kcal/kg adj BW (103.4 kg)   EST Needs (kcal/day) 2585 kcal       Protein Requirements 1.0-1.2 g/kg adj BW   EST Daily Needs (g/day) 103-124 g       Fluid Requirements 25 ml/kg IBW    Estimated Needs (mL/day) 2055 ml     Labs/Medications         Pertinent Labs Reviewed.   Results from last 7 days   Lab Units 06/01/23  0629   SODIUM mmol/L 136   POTASSIUM mmol/L 4.5   CHLORIDE mmol/L 100   CO2 mmol/L 29.8*   BUN mg/dL 9   CREATININE mg/dL 0.53*   CALCIUM mg/dL 8.4*   GLUCOSE mg/dL 203*       Results from last 7 days   Lab Units 06/01/23  0629   HEMOGLOBIN g/dL 10.6*   HEMATOCRIT % 35.0*       COVID19   Date Value Ref Range Status   05/12/2023 Not Detected Not Detected - Ref. Range Final     Lab Results   Component Value Date    HGBA1C 6.60 (H) 04/19/2023         Pertinent Medications Reviewed.     Current Nutrition Orders & Evaluation of Intake       Oral Nutrition     Current PO Diet Diet: Cardiac Diets, Fluid Restriction (240 mL/tray) Diets, Diabetic Diets; Healthy Heart (2-3 Na+); Consistent Carbohydrate; 2000 mL/day; Texture: Regular Texture (IDDSI 7); Fluid Consistency: Thin (IDDSI 0)   Supplement No active supplement orders       Malnutrition Severity Assessment                Nutrition Diagnosis         Nutrition Dx Problem 1 No nutrition diagnosis at this time.     Nutrition Intervention         No intervention indicated.      Medical Nutrition Therapy/Nutrition Education          Learner     Readiness N/A  N/A     Method     Response N/A  N/A     Monitor/Evaluation        Monitor Per protocol.     Nutrition Discharge Plan         No nutrition  needs identified at this time.     Electronically signed by:  Carmel Patino RD  06/05/23 14:51 EDT

## 2023-06-06 LAB
ALBUMIN SERPL-MCNC: 2.7 G/DL (ref 3.5–5.2)
ALBUMIN/GLOB SERPL: 0.6 G/DL
ALP SERPL-CCNC: 321 U/L (ref 39–117)
ALT SERPL W P-5'-P-CCNC: 18 U/L (ref 1–41)
ANION GAP SERPL CALCULATED.3IONS-SCNC: 7.3 MMOL/L (ref 5–15)
AST SERPL-CCNC: 32 U/L (ref 1–40)
BASOPHILS # BLD AUTO: 0.03 10*3/MM3 (ref 0–0.2)
BASOPHILS NFR BLD AUTO: 0.8 % (ref 0–1.5)
BILIRUB SERPL-MCNC: 0.6 MG/DL (ref 0–1.2)
BUN SERPL-MCNC: 11 MG/DL (ref 8–23)
BUN/CREAT SERPL: 18.6 (ref 7–25)
CALCIUM SPEC-SCNC: 8.8 MG/DL (ref 8.6–10.5)
CHLORIDE SERPL-SCNC: 101 MMOL/L (ref 98–107)
CO2 SERPL-SCNC: 29.7 MMOL/L (ref 22–29)
CREAT SERPL-MCNC: 0.59 MG/DL (ref 0.76–1.27)
DEPRECATED RDW RBC AUTO: 47.8 FL (ref 37–54)
EGFRCR SERPLBLD CKD-EPI 2021: 108.3 ML/MIN/1.73
EOSINOPHIL # BLD AUTO: 0.13 10*3/MM3 (ref 0–0.4)
EOSINOPHIL NFR BLD AUTO: 3.6 % (ref 0.3–6.2)
ERYTHROCYTE [DISTWIDTH] IN BLOOD BY AUTOMATED COUNT: 16.5 % (ref 12.3–15.4)
GLOBULIN UR ELPH-MCNC: 4.2 GM/DL
GLUCOSE BLDC GLUCOMTR-MCNC: 178 MG/DL (ref 70–99)
GLUCOSE BLDC GLUCOMTR-MCNC: 241 MG/DL (ref 70–99)
GLUCOSE BLDC GLUCOMTR-MCNC: 267 MG/DL (ref 70–99)
GLUCOSE BLDC GLUCOMTR-MCNC: 278 MG/DL (ref 70–99)
GLUCOSE SERPL-MCNC: 217 MG/DL (ref 65–99)
HCT VFR BLD AUTO: 36.7 % (ref 37.5–51)
HGB BLD-MCNC: 11.3 G/DL (ref 13–17.7)
IMM GRANULOCYTES # BLD AUTO: 0.01 10*3/MM3 (ref 0–0.05)
IMM GRANULOCYTES NFR BLD AUTO: 0.3 % (ref 0–0.5)
LYMPHOCYTES # BLD AUTO: 1.07 10*3/MM3 (ref 0.7–3.1)
LYMPHOCYTES NFR BLD AUTO: 29.6 % (ref 19.6–45.3)
MAGNESIUM SERPL-MCNC: 1.7 MG/DL (ref 1.6–2.4)
MCH RBC QN AUTO: 24.5 PG (ref 26.6–33)
MCHC RBC AUTO-ENTMCNC: 30.8 G/DL (ref 31.5–35.7)
MCV RBC AUTO: 79.6 FL (ref 79–97)
MONOCYTES # BLD AUTO: 0.55 10*3/MM3 (ref 0.1–0.9)
MONOCYTES NFR BLD AUTO: 15.2 % (ref 5–12)
NEUTROPHILS NFR BLD AUTO: 1.83 10*3/MM3 (ref 1.7–7)
NEUTROPHILS NFR BLD AUTO: 50.5 % (ref 42.7–76)
NRBC BLD AUTO-RTO: 0 /100 WBC (ref 0–0.2)
PHOSPHATE SERPL-MCNC: 3.6 MG/DL (ref 2.5–4.5)
PLATELET # BLD AUTO: 152 10*3/MM3 (ref 140–450)
PMV BLD AUTO: 10.7 FL (ref 6–12)
POTASSIUM SERPL-SCNC: 3.8 MMOL/L (ref 3.5–5.2)
PROT SERPL-MCNC: 6.9 G/DL (ref 6–8.5)
RBC # BLD AUTO: 4.61 10*6/MM3 (ref 4.14–5.8)
SODIUM SERPL-SCNC: 138 MMOL/L (ref 136–145)
WBC NRBC COR # BLD: 3.62 10*3/MM3 (ref 3.4–10.8)

## 2023-06-06 PROCEDURE — 99233 SBSQ HOSP IP/OBS HIGH 50: CPT | Performed by: INTERNAL MEDICINE

## 2023-06-06 PROCEDURE — 63710000001 INSULIN DETEMIR PER 5 UNITS: Performed by: INTERNAL MEDICINE

## 2023-06-06 PROCEDURE — 63710000001 INSULIN LISPRO (HUMAN) PER 5 UNITS

## 2023-06-06 PROCEDURE — 83735 ASSAY OF MAGNESIUM: CPT | Performed by: INTERNAL MEDICINE

## 2023-06-06 PROCEDURE — 85025 COMPLETE CBC W/AUTO DIFF WBC: CPT | Performed by: INTERNAL MEDICINE

## 2023-06-06 PROCEDURE — 63710000001 INSULIN LISPRO (HUMAN) PER 5 UNITS: Performed by: INTERNAL MEDICINE

## 2023-06-06 PROCEDURE — 84100 ASSAY OF PHOSPHORUS: CPT | Performed by: INTERNAL MEDICINE

## 2023-06-06 PROCEDURE — 94799 UNLISTED PULMONARY SVC/PX: CPT

## 2023-06-06 PROCEDURE — 82948 REAGENT STRIP/BLOOD GLUCOSE: CPT

## 2023-06-06 PROCEDURE — 80053 COMPREHEN METABOLIC PANEL: CPT | Performed by: INTERNAL MEDICINE

## 2023-06-06 RX ORDER — METOPROLOL SUCCINATE 25 MG/1
37.5 TABLET, EXTENDED RELEASE ORAL EVERY 12 HOURS SCHEDULED
Status: DISCONTINUED | OUTPATIENT
Start: 2023-06-06 | End: 2023-06-15 | Stop reason: HOSPADM

## 2023-06-06 RX ADMIN — PREGABALIN 25 MG: 25 CAPSULE ORAL at 21:23

## 2023-06-06 RX ADMIN — APIXABAN 5 MG: 5 TABLET, FILM COATED ORAL at 09:56

## 2023-06-06 RX ADMIN — MIDODRINE HYDROCHLORIDE 5 MG: 5 TABLET ORAL at 06:46

## 2023-06-06 RX ADMIN — INSULIN LISPRO 8 UNITS: 100 INJECTION, SOLUTION INTRAVENOUS; SUBCUTANEOUS at 17:30

## 2023-06-06 RX ADMIN — INSULIN DETEMIR 45 UNITS: 100 INJECTION, SOLUTION SUBCUTANEOUS at 09:52

## 2023-06-06 RX ADMIN — METOPROLOL SUCCINATE 50 MG: 50 TABLET, EXTENDED RELEASE ORAL at 09:56

## 2023-06-06 RX ADMIN — PREGABALIN 25 MG: 25 CAPSULE ORAL at 05:14

## 2023-06-06 RX ADMIN — Medication 250 MG: at 21:23

## 2023-06-06 RX ADMIN — AMOXICILLIN 1000 MG: 500 CAPSULE ORAL at 05:14

## 2023-06-06 RX ADMIN — OXYCODONE HYDROCHLORIDE 10 MG: 5 TABLET ORAL at 09:55

## 2023-06-06 RX ADMIN — INSULIN LISPRO 4 UNITS: 100 INJECTION, SOLUTION INTRAVENOUS; SUBCUTANEOUS at 21:23

## 2023-06-06 RX ADMIN — Medication 250 MG: at 09:54

## 2023-06-06 RX ADMIN — ACETAMINOPHEN 1000 MG: 325 TABLET ORAL at 05:14

## 2023-06-06 RX ADMIN — DICLOFENAC 2 G: 10 GEL TOPICAL at 21:24

## 2023-06-06 RX ADMIN — FAMOTIDINE 40 MG: 20 TABLET ORAL at 09:56

## 2023-06-06 RX ADMIN — AMOXICILLIN 1000 MG: 500 CAPSULE ORAL at 21:22

## 2023-06-06 RX ADMIN — METOPROLOL SUCCINATE 37.5 MG: 25 TABLET, EXTENDED RELEASE ORAL at 21:22

## 2023-06-06 RX ADMIN — Medication 10 MG: at 21:23

## 2023-06-06 RX ADMIN — LOPERAMIDE HYDROCHLORIDE 2 MG: 2 CAPSULE ORAL at 17:29

## 2023-06-06 RX ADMIN — AMOXICILLIN 1000 MG: 500 CAPSULE ORAL at 13:12

## 2023-06-06 RX ADMIN — INSULIN LISPRO 8 UNITS: 100 INJECTION, SOLUTION INTRAVENOUS; SUBCUTANEOUS at 12:11

## 2023-06-06 RX ADMIN — MIDODRINE HYDROCHLORIDE 5 MG: 5 TABLET ORAL at 17:29

## 2023-06-06 RX ADMIN — INSULIN LISPRO 8 UNITS: 100 INJECTION, SOLUTION INTRAVENOUS; SUBCUTANEOUS at 09:54

## 2023-06-06 RX ADMIN — LOPERAMIDE HYDROCHLORIDE 2 MG: 2 CAPSULE ORAL at 09:56

## 2023-06-06 RX ADMIN — CYCLOBENZAPRINE 10 MG: 10 TABLET, FILM COATED ORAL at 09:55

## 2023-06-06 RX ADMIN — INSULIN LISPRO 3 UNITS: 100 INJECTION, SOLUTION INTRAVENOUS; SUBCUTANEOUS at 12:11

## 2023-06-06 RX ADMIN — INSULIN LISPRO 2 UNITS: 100 INJECTION, SOLUTION INTRAVENOUS; SUBCUTANEOUS at 09:52

## 2023-06-06 RX ADMIN — DILTIAZEM HYDROCHLORIDE 120 MG: 120 CAPSULE, COATED, EXTENDED RELEASE ORAL at 09:55

## 2023-06-06 RX ADMIN — OXYCODONE HYDROCHLORIDE 10 MG: 5 TABLET ORAL at 17:29

## 2023-06-06 RX ADMIN — ATORVASTATIN CALCIUM 10 MG: 10 TABLET, FILM COATED ORAL at 21:23

## 2023-06-06 RX ADMIN — PREGABALIN 25 MG: 25 CAPSULE ORAL at 13:12

## 2023-06-06 RX ADMIN — APIXABAN 5 MG: 5 TABLET, FILM COATED ORAL at 21:23

## 2023-06-06 RX ADMIN — INSULIN LISPRO 4 UNITS: 100 INJECTION, SOLUTION INTRAVENOUS; SUBCUTANEOUS at 17:30

## 2023-06-06 RX ADMIN — INSULIN DETEMIR 48 UNITS: 100 INJECTION, SOLUTION SUBCUTANEOUS at 21:23

## 2023-06-06 RX ADMIN — MIDODRINE HYDROCHLORIDE 5 MG: 5 TABLET ORAL at 12:12

## 2023-06-06 RX ADMIN — DIGOXIN 125 MCG: 125 TABLET ORAL at 12:12

## 2023-06-06 NOTE — PROGRESS NOTES
[facility]   Hospitalist Progress Note    Date of admission: 5/12/2023  Patient Name: Jose Shaikh  1960  Date: 6/6/2023      Subjective     Chief Complaint   Patient presents with    Hip Pain    Shoulder Pain       Summary: 62 y.o. male with hx of osteomyelitis (recent discharge for similar), dm2 and recurrent/chronic foot infections, afib, morbid obesity who presented with worsening hip pain.         Patient was recently admitted to the hospital in early April for foot infection.  There was recommendation for amputation due to osteomyelitis in the foot and the patient refused.  He was unable to be placed at that time.  He is a sex offender making it very difficult for placement.  Pt treated with oral abx with doxy/cipro for Alcaligenes facialis and Morganella morganii last admission. He follows with wound care/Dr Daniels and had attempted to switch antibiotics to Levaquin per pharmacy recommendations based off culture data but was unable to get hold the patient so he was never switched.      Treating for right foot osteomyelitis and bacteremia with 6 week course with amoxicillin per ID.  Debilitated and working on rehab placement.  For hip pain - X-ray of pelvis and hip shows DJD of bilateral hips left worse than right.  Patient refused MRI of the left hip and order was canceled as patient is very claustrophobic.      Interval Followup: having persistent joint pain but maybe slightly better today.  No fevers.  Diarrhea slightly better.       Objective     Vitals:   Temp:  [97.7 °F (36.5 °C)-98.6 °F (37 °C)] 98.1 °F (36.7 °C)  Heart Rate:  [64-74] 67  Resp:  [16] 16  BP: (104-131)/(57-84) 121/67    Physical Exam  Awake, conversant, morbidly obese, appears older than stated age  B/l foot wounds dressed, no acute drainage  Left knee mild ttp, no acute effusion noted   Breathing comfortably on room air  Rrr  Aox3     Result Review:  Vital signs, labs and recent relevant imaging reviewed.      acetaminophen,  1,000 mg, Oral, Q8H  amoxicillin, 1,000 mg, Oral, Q8H  apixaban, 5 mg, Oral, Q12H  atorvastatin, 10 mg, Oral, Nightly  Diclofenac Sodium, 2 g, Topical, 4x Daily  digoxin, 125 mcg, Oral, Daily  dilTIAZem CD, 120 mg, Oral, Q24H  famotidine, 40 mg, Oral, Daily  insulin detemir, 45 Units, Subcutaneous, Q12H  insulin lispro, 2-7 Units, Subcutaneous, 4x Daily With Meals & Nightly  insulin lispro, 8 Units, Subcutaneous, TID With Meals  metoprolol succinate XL, 50 mg, Oral, Q12H  midodrine, 5 mg, Oral, TID AC  pregabalin, 25 mg, Oral, Q8H  saccharomyces boulardii, 250 mg, Oral, BID  sodium chloride, 10 mL, Intravenous, Q12H          aluminum-magnesium hydroxide-simethicone    [DISCONTINUED] senna-docusate sodium **AND** polyethylene glycol **AND** bisacodyl **AND** bisacodyl    calcium carbonate    cyclobenzaprine    dextrose    dextrose    glucagon (human recombinant)    loperamide    melatonin    metoprolol tartrate    [] Morphine **AND** naloxone    ondansetron    oxyCODONE    phenylephrine-mineral oil-petrolatum    [COMPLETED] Insert peripheral IV **AND** sodium chloride    sodium chloride    sodium chloride      Assessment / Plan     Assessment/Plan:  Sepsis poa 2/2 pna, bacteremia  RUL healthcare associated pneumonia  Streptococcus agalactiae bacteremia  Chronic osteomyelitis of right foot involving cuboid and maybe cuneiform.  On p.o. amoxicillin for 6 weeks  Cellulitis of right foot due to Streptococcus agalactiae.  Paroxysmal A-fib with intermittent RVR on Eliquis.  Heart rates have been well controlled  Bilateral diabetic foot ulcers.  NIDDM.  Most recent A1c 6.6 in 2023  Morbid obesity BMI of 44.8, down from 48.1 on admission  S/p TMA of right foot.  Hyponatremia.  likely from hyperglycemia/volume overload.  Clinically significant.  Resolved.  Hypophosphatemia.  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  Chronic shoulder neck pain  Hypertension.  Blood pressure stable.  Diarrhea most  likely antibiotic associated  Prolonged hospitalization, admitted 5/12/23    Add voltaren for joint pain, continue additional prn/pain medications as above  Still requiring midodrine for blood pressure support  Cont diltiazem, digoxin, will decrease metop succinate from 50 bid to 37.5 bid and see if pt able to wean off of midodrine, monitor HR - has been 60-70s most recently  Cont apixaban for pafib   Cont po amoxicillin for osteomyelitis 6/23, florastor   Levemir, ssi, lyrica, monitor blood glucose, titrate further today   Cont famotidine  C diff negative, prn loperamide if needed, discontinue scheduled bowel reg  Previous cxr reviewed, rul pna noted on my review  Check a.m. CBC, BMP, magnesium, phosphorus  Continue hospital monitoring and treatment at current level of care - does not need upgraded at this time.  Discuss with kalen, rn     Rehab placement difficulties, cont tx and monitoring as above       DVT prophylaxis:  Medical DVT prophylaxis orders are present.    Level Of Support Discussed With: Patient  Code Status (Patient has no pulse and is not breathing): CPR (Attempt to Resuscitate)  Medical Interventions (Patient has pulse or is breathing): Full Support        CBC          5/25/2023    05:06 6/1/2023    06:29 6/6/2023    04:52   CBC   WBC 6.34  4.20  3.62    RBC 4.41  4.37  4.61    Hemoglobin 11.0  10.6  11.3    Hematocrit 35.8  35.0  36.7    MCV 81.2  80.1  79.6    MCH 24.9  24.3  24.5    MCHC 30.7  30.3  30.8    RDW 16.3  16.9  16.5    Platelets 198  153  152        CMP          5/25/2023    05:06 6/1/2023    06:29 6/6/2023    04:52   CMP   Glucose 179  203  217    BUN 10  9  11    Creatinine 0.39  0.53  0.59    EGFR 122.8  111.9  108.3    Sodium 135  136  C 138    Potassium 4.0  4.5  C 3.8    Chloride 101  100  C 101    Calcium 8.4  8.4  8.8    Total Protein 6.6   6.9    Albumin 2.1   2.7    Globulin 4.5   4.2    Total Bilirubin 0.7   0.6    Alkaline Phosphatase 565   321    AST (SGOT) 52   32    ALT  (SGPT) 35   18    Albumin/Globulin Ratio 0.5   0.6    BUN/Creatinine Ratio 25.6  17.0  18.6    Anion Gap 5.6  6.2  C 7.3       Details         C Corrected result

## 2023-06-06 NOTE — DISCHARGE PLACEMENT REQUEST
"Sami Shaikh (62 y.o. Male)       Date of Birth   1960    Social Security Number       Address   [address] [facility] [address] [facility] [address]    Home Phone   [phone]    MRN   [id number]       Islam   None    Marital Status                               Admission Date   5/12/23    Admission Type   Emergency    Admitting Provider   Ashvin Tyler MD    Attending Provider   John Emerson MD    Department, Room/Bed   [address], 4021/1       Discharge Date       Discharge Disposition       Discharge Destination                                 Attending Provider: John Emerson MD    Allergies: Adhesive Tape    Isolation: None   Infection: None   Code Status: CPR    Ht: 188 cm (74\")   Wt: 155 kg (341 lb 14.9 oz)    Admission Cmt: None   Principal Problem: Sepsis [A41.9]                   Active Insurance as of 5/12/2023       Primary Coverage       Payor Plan Insurance Group Employer/Plan Group    [facility] MEDICARE REPLACEMENT [facility] DUAL COMPLETE MEDICARE REPLACEMENT [id number]       Payor Plan Address Payor Plan Phone Number Payor Plan Fax Number Effective Dates    [address] [phone]  5/1/2022 - None Entered    [facility] [address]         Subscriber Name Subscriber Birth Date Member ID       SAMI SHAIKH 1960 [id number]               Secondary Coverage       Payor Plan Insurance Group Employer/Plan Group    [place]        Payor Plan Address Payor Plan Phone Number Payor Plan Fax Number Effective Dates    [address] [phone]  6/22/2021 - None Entered    [facility] [address]         Subscriber Name Subscriber Birth Date Member ID       SAMI SHAIKH 1960 [id number]                     Emergency Contacts        (Rel.) Home Phone Work Phone Mobile Phone    Jermaine Huff (Son) -- -- [phone]    RefugioRadha (Daughter) -- -- [phone]              Emergency Contact " Information       Name Relation Home Work Mobile    Jermaine Huff Son   [phone]    Radha Huff Daughter   [phone]          Insurance Information                  [facility] MEDICARE REPLACEMENT/[facility] DUAL COMPLETE MEDICARE REPLACEMENT Phone: [phone]    Subscriber: Jose Shaikh Subscriber#: [id number]    Group#: [id number] Precert#: --        [place] Phone: [phone]    Subscriber: Jose Shaikh Subscriber#: [id number]    Group#: -- Precert#: [id number]             History & Physical        Darrin Lamar MD at 23 1703           [facility]  [facility]   Patient Name: Jose Shaikh  : 1960  MRN: [id number]  Primary Care Physician:  Delia Cervantes, VALENTÍN  Date of admission: 2023    Subjective   Subjective     Chief Complaint: Hip pain    HPI:    Jose Shaikh is a 62 y.o. male past medical history of osteomyelitis, type 2 diabetes, hypertension, A-fib, dyslipidemia, obesity with BMI of 48, who was recently hospital for osteomyelitis and he returns due to hip pain.      Patient was recently admitted to the hospital in early April for foot infection.  There was recommendation for amputation due to osteomyelitis in the foot and the patient refused.  He was unable to be placed at that time.  He is a sex offender making it very difficult for placement although [facility] was an option.  As result, he opted to go home on oral antibiotics to treat Alcaligenes facialis and Morganella morganii with doxycycline and ciprofloxacin.  The patient was seen in wound care in middle of April and at that time Dr. Daniels attempted to switch antibiotics to Levaquin per pharmacy recommendations based off culture data but was unable to get hold the patient so he was never switched.  The patient states the only reason he came to the emergency department was because his left hip was hurting.  Unaware of fevers.  Chills.   Cough.  Shortness of breath.  The patient came to the ER for hip pain.      In the emergency department the patient's vital signs are as follows temperature is 103.4, pulse is 139, blood pressure is 92/65, 95% on room air.  CBC shows a white blood cell count of 20.55 with neutrophils of 88.2.  CRP is 20.53, which is up from 7.4 in April and lactate is 2.6.  Sed rate was 42 in April and is currently 75.  Bicarb is 19.9 and sodium is 130.  Urinalysis shows positive nitrite and small leuk esterase but no bacteria.  Patient also seems to have a pneumonia in his right upper lobe.  X-ray of the right foot shows evidence of osteomyelitis involving the distal aspect of the cuboid and gas in the soft tissue along the plantar aspect of the foot just proximal amputation site and periostitis involving the cuneiform.  Patient got 1 L of normal saline.  Patient received Zosyn and vancomycin for antibiotics.  Podiatry was consulted and did not feel like the foot was the source.  Patient will be admitted to the hospital for severe sepsis with unclear source at this time.  The source could be pneumonia, urinary tract infection or most likely the foot.  Patient will also be managed with A-fib with RVR    All systems reviewed abnormalities noted above    Personal History     Past Medical History:  Osteomyelitis  Type 2 diabetes  Hypertension  A-fib   Dyslipidemia  Obesity with BMI 48    Past Surgical History:  Cyst removal  Incision and drainage  Incision and drainage of the leg  Other surgical history  Toe surgery  Transmetatarsal amputation    Family History:   Cancer  Heart disease    Social History:   Former cigarette smoker.  Rare alcohol use    Home Medications:  Dulaglutide, Insulin Lispro, acetaminophen, apixaban, ciprofloxacin, doxycycline, fluticasone, gabapentin, insulin detemir, lisinopril, meloxicam, metFORMIN, metoprolol succinate XL, and simvastatin    Allergies:  Allergies   Allergen Reactions    Adhesive Tape Rash        Objective   Objective     Vitals:   Temp:  [99.2 °F (37.3 °C)-103.4 °F (39.7 °C)] 102.4 °F (39.1 °C)  Heart Rate:  [] 148  Resp:  [18] 18  BP: ()/(59-99) 117/75    Physical Exam    Constitutional: Awake, alert, no acute distress   Eyes: Pupils equal, sclerae anicteric, no conjunctival injection   HENT: NCAT, mucous membranes moist   Neck: Supple, no thyromegaly, no lymphadenopathy, trachea midline   Respiratory coarse breath sounds.  Body habitus makes it difficult to get a good lung exam   Cardiovascular: RRR, no murmurs, rubs, or gallops, palpable pedal pulses bilaterally   Gastrointestinal: Positive bowel sounds, soft, nontender, nondistended   Musculoskeletal: No bilateral ankle edema, no clubbing or cyanosis to extremities   Psychiatric: Appropriate affect, cooperative   Neurologic: Oriented x 3, strength symmetric in all extremities, Cranial Nerves grossly intact to confrontation, speech clear   Skin: No rashes.  Right foot shows amputation transmetatarsal.  There is a dehisced wound there is area of warmth and redness.    Result Review    Result Review:  I have personally reviewed the results from the time of this admission to 5/12/2023 17:03 EDT and agree with these findings: White blood cell count is 20,000  White blood cell count is 20,000  CRP is 20  ESR 75  Lactate is 2.6  Procalcitonin is 1.3  Bicarb is 19.9 with mild anion gap of 12    X-ray of right foot shows evidence of osteomyelitis involving the distal aspect of the cuboid gas and soft tissue along the plantar aspect of the foot just proximal amputation site  Periostitis of the cuneiform.    Chest x-ray shows right upper lobe pneumonia      Assessment & Plan   Assessment / Plan     Assessment/Plan:   Severe sepsis present on admission (fever, tachycardia, leukocytosis)  Osteomyelitis of the right foot (increased CRP, increased sed rate from April) with wound culture of Alcaligenes and Morganella from 4/3  Right upper lobe  pneumonia  A-fib with RVR  Abnormal urinalysis with nitrite positive and leuk esterase small  Type 2 diabetes with hyperglycemia  Hyponatremia  Lactic acidosis    Jose is a 62-year-old gentleman with past medical history of obesity, type 2 diabetes, and osteomyelitis of the right foot status post transmetatarsal amputation.  He was recently admitted to the hospital early April with recommendation to do a further amputation but he refused as he could not be placed into a rehab facility.  He chose to be discharged home on oral antibiotics with Doxy and Cipro.  The wound culture grew Alcaligenes and Morganella which is susceptible to Levaquin and resistant to Doxy.  The patient presents today with a fever to 103, tachycardia with A-fib with RVR, and a leukocytosis to 20,000.  CRP is up from 7->20 and ESR is up from 46->75.  X-ray shows findings consistent with osteomyelitis and then there is an area concerning for gas.  Patient also has a chest x-ray that shows right upper lobe pneumonia.  With a procalcitonin of 1.30.  Patient was seen by podiatry in the emergency department and did not feel that the foot wound was causing the patient's presentation.  However, I am concerned as the patient has no other signs symptoms of pneumonia at this time.  I will treat him as if the foot is the source with Zyvox for toxin mediating properties and Zosyn to cover gram-negative's and anaerobes.  Patient will be treated for sepsis due to unclear source at this time.        Plan:  -- Admit to hospitalist service  -- Consult podiatry  -- We will start with Zyvox and Zosyn due to the concern for toxin forming bacteria in the foot wound and lack of access to clindamycin  -- We will bolus a second liter of lactated Ringer's  -- Strep and Legionella urine antigen for possible pneumonia  -- Respiratory culture and blood culture  -- Procalcitonin now and again in the morning  -- Oxygen for saturations less than 90% although he does not  require oxygen at this time  -- Repeat CRP in the morning  -- We will continue diltiazem drip for A-fib with RVR  -- Sliding scale      DVT prophylaxis:  Lovenox      CODE STATUS:     Full code    Admission Status:  I believe this patient meets admission status.    Electronically signed by Darrin Lamar MD, 23, 5:03 PM EDT.             Electronically signed by Darrin Lamar MD at 23 1807       Physician Progress Notes (last 24 hours)  Notes from 23 1318 through 23 1318   No notes of this type exist for this encounter.          Physical Therapy Notes (last 7 days)             Mckenna Wong PTA at 23 0852  Version 1 of 1         Acute Care - Physical Therapy Treatment Note  [facility]ing[facility]     Patient Name: Jose Shaikh  : 1960  MRN: [id number]  Today's Date: 2023      Visit Dx:     ICD-10-CM ICD-9-CM   1. Acute febrile illness  R50.9 780.60   2. Sepsis without acute organ dysfunction, due to unspecified organism  A41.9 038.9     995.91   3. Pneumonia of right upper lobe due to infectious organism  J18.9 486   4. Uncontrolled type 2 diabetes mellitus with hyperglycemia  E11.65 250.02   5. Atrial fibrillation with rapid ventricular response  I48.91 427.31   6. Acute osteomyelitis of right foot  M86.171 730.07   7. Decreased activities of daily living (ADL)  Z78.9 V49.89   8. Difficulty in walking  R26.2 719.7     Patient Active Problem List   Diagnosis    Diabetic ulcer of left heel associated with type 2 DM    Acute osteomyelitis of left calcaneus     Diabetic ulcer of left heel associated with type 2 DM    Diabetic ulcer of right midfoot associated with type 2 DM    Paroxysmal atrial fibrillation    Essential hypertension    Hyperlipidemia LDL goal <100    Cellulitis and abscess of foot    High alkaline phosphatase level    Osteomyelitis    Onychomycosis    Onychocryptosis    Foot pain, bilateral    Osteomyelitis of foot, right, acute    Cellulitis of right foot    Type 2 diabetes  mellitus, with long-term current use of insulin    Class 3 severe obesity due to excess calories with serious comorbidity and body mass index (BMI) of 45.0 to 49.9 in adult    Anxiety disorder, unspecified    Claustrophobia    Dependence on wheelchair    Depression, unspecified    Long term (current) use of anticoagulants    Long term (current) use of oral hypoglycemic drugs    Wound of foot    Non-prs chronic ulcer oth prt r foot limited to brkdwn skin    Orthostatic hypotension    Other chronic osteomyelitis, right ankle and foot    Personal history of nicotine dependence    Thrombocytopenia, unspecified    Unspecified open wound, right foot, initial encounter    Diabetic foot infection    Subacute osteomyelitis of right foot    Right foot pain    Sepsis    Onychomycosis    Foot pain, left     Past Medical History:   Diagnosis Date    Absence of toe of right foot     Acute osteomyelitis of left calcaneus  8/18/2021    Anxiety and depression     Arthritis     Claustrophobia     Corns and callus     Diabetic ulcer of left heel associated with type 2 DM 8/18/2021    Diabetic ulcer of left heel associated with type 2 DM 7/6/2021    Diabetic ulcer of right midfoot associated with type 2 DM 8/18/2021    Difficulty walking     Essential hypertension 8/31/2021    Hammertoe     Hyperlipidemia LDL goal <100 8/31/2021    Ingrown toenail     Obesity     Paroxysmal atrial fibrillation 8/31/2021    Polyneuropathy     Pressure ulcer, stage 1     Tinea unguium     Type 2 diabetes mellitus with polyneuropathy      Past Surgical History:   Procedure Laterality Date    CYST REMOVAL      center of back; benign    INCISION AND DRAINAGE ABSCESS      back    INCISION AND DRAINAGE LEG Right 12/10/2021    Procedure: INCISION AND DRAINAGE LOWER EXTREMITY;  Surgeon: Ash Leyva DPM;  Location: [facility] OR;  Service: Podiatry;  Laterality: Right;    OTHER SURGICAL HISTORY      Surgical clips left foot    TOE SURGERY Right      Removal of 5th toe    TRANS METATARSAL AMPUTATION Right 12/2/2021    Procedure: AMPUTATION TRANS METATARSAL;  Surgeon: Ash Leyva DPM;  Location: [facility] MAIN OR;  Service: Podiatry;  Laterality: Right;    WRIST SURGERY Left     repair of injury     PT Assessment (last 12 hours)       PT Evaluation and Treatment       Row Name 05/31/23 0844          Physical Therapy Time and Intention    Subjective Information complains of;weakness;fatigue;pain  -DK     Document Type therapy note (daily note)  -DK     Mode of Treatment individual therapy;physical therapy  -DK     Patient Effort good  -DK     Symptoms Noted During/After Treatment increased pain;fatigue  -DK     Comment Pt was rather forceful with requests to stand/raise bed level, bed mobs.  Pt states he wants to just get up and walk around the room, but is unable to stand beyond 7 seconds currently.  -       Row Name 05/31/23 0844          Pain    Pretreatment Pain Rating 7/10  -DK     Posttreatment Pain Rating 7/10  -DK     Pain Location - Side/Orientation Bilateral  -DK     Pain Location generalized  -DK     Pain Location - back;hip;knee;foot  -DK     Pain Intervention(s) Repositioned;Ambulation/increased activity;Distraction;Therapeutic presence  -DK       Row Name 05/31/23 0844          Cognition    Affect/Mental Status (Cognition) WFL  -DK     Orientation Status (Cognition) oriented x 4  -DK     Follows Commands (Cognition) WFL  -DK     Cognitive Function WFL  -DK     Personal Safety Interventions gait belt;nonskid shoes/slippers when out of bed;supervised activity  -       Row Name 05/31/23 0844          Mobility    Extremity Weight-bearing Status left lower extremity;right lower extremity  -DK     Left Lower Extremity (Weight-bearing Status) non weight-bearing (NWB)  -DK     Right Lower Extremity (Weight-bearing Status) non weight-bearing (NWB)  -DK     Additional Documentation --  If necessary, pt can weight bear on the right heel, left  forefoot.  -DK       Row Name 05/31/23 0844          Bed Mobility    Bed Mobility scooting/bridging;supine-sit-supine  -DK     All Activities, [place] (Bed Mobility) maximum assist (25% patient effort);2 person assist;moderate assist (50% patient effort)  -DK     Scooting/Bridging [place] (Bed Mobility) maximum assist (25% patient effort);2 person assist  -DK     Supine-Sit [place] (Bed Mobility) contact guard;1 person assist;standby assist  -DK     Sit-Supine [place] (Bed Mobility) contact guard;1 person assist;minimum assist (75% patient effort)  -DK     Supine-Sit-Supine [place] (Bed Mobility) contact guard;1 person assist;standby assist  -DK     Bed Mobility, Safety Issues decreased use of arms for pushing/pulling;decreased use of legs for bridging/pushing  -DK     Assistive Device (Bed Mobility) bed rails;draw sheet  -       Row Name 05/31/23 0844          Transfers    Transfers sit-stand transfer;stand-sit transfer  -DK     Maintains Weight-bearing Status (Transfers) unable to maintain weight-bearing status  -     Comment, (Transfers) Pt attempts to decline assistance with standing transfers.  He was able to stand erect x 7 seconds with a rolling walker, on room air, with SUHA x 2, but was unable to take steps.  He returned to bed on alert post treatment.  -       Row Name 05/31/23 0844          Sit-Stand Transfer    Sit-Stand [place] (Transfers) contact guard;minimum assist (75% patient effort);2 person assist  -     Assistive Device (Sit-Stand Transfers) walker, front-wheeled  -       Row Name 05/31/23 0844          Stand-Sit Transfer    Stand-Sit [place] (Transfers) contact guard;minimum assist (75% patient effort);2 person assist  -DK       Row Name 05/31/23 0844          Safety Issues, Functional Mobility    Safety Issues Affecting Function (Mobility) awareness of need for assistance;judgment;safety precaution awareness  -     Impairments Affecting Function  (Mobility) balance;endurance/activity tolerance;pain;range of motion (ROM);strength  -       Row Name 05/31/23 0844          Balance    Balance Assessment sitting static balance;sitting dynamic balance;standing static balance  -     Static Sitting Balance standby assist  -     Dynamic Sitting Balance standby assist  -     Position, Sitting Balance unsupported;sitting edge of bed  -     Static Standing Balance contact guard;2-person assist;minimal assist  -     Position/Device Used, Standing Balance walker, front-wheeled  -     Balance Interventions standing;supported;static;dynamic;tandem standing  -       Row Name 05/31/23 0844          Motor Skills    Motor Skills --  therapeutic exercises  -     Coordination WFL  -     Therapeutic Exercise hip;knee;ankle  -       Row Name 05/31/23 0844          Hip (Therapeutic Exercise)    Hip (Therapeutic Exercise) AAROM (active assistive range of motion)  -     Hip AAROM (Therapeutic Exercise) bilateral;flexion;extension;aBduction;aDduction;supine;10 repetitions;2 sets  -       Row Name 05/31/23 0844          Knee (Therapeutic Exercise)    Knee (Therapeutic Exercise) AAROM (active assistive range of motion)  -     Knee AAROM (Therapeutic Exercise) bilateral;flexion;extension;supine;10 repetitions;2 sets  -       Row Name 05/31/23 0844          Ankle (Therapeutic Exercise)    Ankle (Therapeutic Exercise) AROM (active range of motion)  -     Ankle AROM (Therapeutic Exercise) bilateral;dorsiflexion;plantarflexion;supine;20 repititions  -       Row Name             Wound 12/02/21 Right anterior foot Incision    Wound - Properties Group Placement Date: 12/02/21  -ES Side: Right  -ES Orientation: anterior  -ES Location: foot  -ES Primary Wound Type: Incision  -ES    Retired Wound - Properties Group Placement Date: 12/02/21  -ES Side: Right  -ES Orientation: anterior  -ES Location: foot  -ES Primary Wound Type: Incision  -ES    Retired Wound -  Properties Group Date first assessed: 12/02/21  -ES Side: Right  -ES Location: foot  -ES Primary Wound Type: Incision  -ES      Row Name             Wound 06/22/21 1133 Left lateral heel    Wound - Properties Group Placement Date: 06/22/21  -FABIOLA Placement Time: 1133  -FABIOLA Present on Hospital Admission: Y  -FABIOLA Side: Left  -FABIOLA Orientation: lateral  -FABIOLA Location: heel  -FABIOLA    Retired Wound - Properties Group Placement Date: 06/22/21  -FABIOLA Placement Time: 1133  -FABIOLA Present on Hospital Admission: Y  -FABIOLA Side: Left  -FABIOLA Orientation: lateral  -FABIOLA Location: heel  -FABIOLA    Retired Wound - Properties Group Date first assessed: 06/22/21  -FABIOLA Time first assessed: 1133  -FABIOLA Present on Hospital Admission: Y  -FABIOLA Side: Left  -FABIOLA Location: heel  -FABIOLA      Row Name             Wound 05/19/23 1317 gluteal Blisters    Wound - Properties Group Placement Date: 05/19/23  -RASHARD Placement Time: 1317  -RAHSARD Present on Hospital Admission: N  -RASHARD Location: gluteal  -RASHARD Primary Wound Type: Blisters  -RASHARD    Retired Wound - Properties Group Placement Date: 05/19/23  -RASHARD Placement Time: 1317  -RASHARD Present on Hospital Admission: N  -RASHARD Location: gluteal  -RASHARD Primary Wound Type: Blisters  -RASHARD    Retired Wound - Properties Group Date first assessed: 05/19/23  -RASHARD Time first assessed: 1317  -RASHARD Present on Hospital Admission: N  -RASHARD Location: gluteal  -RASHARD Primary Wound Type: Blisters  -RASHARD      Row Name 05/31/23 0844          Plan of Care Review    Plan of Care Reviewed With patient  -DK     Progress improving  -DK       Row Name 05/31/23 0844          Positioning and Restraints    Pre-Treatment Position in bed  -DK     Post Treatment Position bed  -DK     In Bed supine;call light within reach;encouraged to call for assist;exit alarm on;side rails up x3;legs elevated;heels elevated  -DK       Row Name 05/31/23 0844          Therapy Assessment/Plan (PT)    Rehab Potential (PT) fair, will monitor progress closely  -DK     Criteria for Skilled Interventions Met (PT)  skilled treatment is necessary  -DK     Therapy Frequency (PT) daily  -DK     Problem List (PT) problems related to;balance;mobility;range of motion (ROM);strength;pain  -DK     Activity Limitations Related to Problem List (PT) unable to ambulate safely;unable to transfer safely  -DK       Row Name 05/31/23 0844          Progress Summary (PT)    Progress Toward Functional Goals (PT) progress toward functional goals is fair  -DK               User Key  (r) = Recorded By, (t) = Taken By, (c) = Cosigned By      Initials Name Provider Type    Karon Jordan, RN Registered Nurse    Mckenna Landaverde PTA Physical Therapist Assistant    Marlen Vaughn RN Registered Nurse    Katlyn Garcia RN Registered Nurse                      PT Recommendation and Plan  Planned Therapy Interventions (PT): balance training, bed mobility training, gait training, home exercise program, strengthening, transfer training  Therapy Frequency (PT): daily  Progress Summary (PT)  Progress Toward Functional Goals (PT): progress toward functional goals is fair  Plan of Care Reviewed With: patient  Progress: improving   Outcome Measures       Row Name 05/31/23 0844 05/30/23 1126          How much help from another person do you currently need...    Turning from your back to your side while in flat bed without using bedrails? 4  -DK 3  -DK     Moving from lying on back to sitting on the side of a flat bed without bedrails? 3  -DK 2  -DK     Moving to and from a bed to a chair (including a wheelchair)? 1  -DK 1  -DK     Standing up from a chair using your arms (e.g., wheelchair, bedside chair)? 2  -DK 1  -DK     Climbing 3-5 steps with a railing? 1  -DK 1  -DK     To walk in hospital room? 1  -DK 1  -DK     AM-PAC 6 Clicks Score (PT) 12  -DK 9  -DK        Functional Assessment    Outcome Measure Options AM-PAC 6 Clicks Basic Mobility (PT)  -DK AM-PAC 6 Clicks Basic Mobility (PT)  -DK               User Key  (r) = Recorded By, (t) =  Taken By, (c) = Cosigned By      Initials Name Provider Type    Mckenna Landaverde PTA Physical Therapist Assistant                     Time Calculation:    PT Charges       Row Name 23 0851             Time Calculation    PT Received On 23  -DK      PT Goal Re-Cert Due Date 23  -DK         Timed Charges    01855 - PT Therapeutic Exercise Minutes 14  -DK      61024 - Gait Training Minutes  1  -DK      27749 - PT Therapeutic Activity Minutes 12  -DK         Total Minutes    Timed Charges Total Minutes 27  -DK       Total Minutes 27  -DK                User Key  (r) = Recorded By, (t) = Taken By, (c) = Cosigned By      Initials Name Provider Type    Mckenna Landaverde PTA Physical Therapist Assistant                  Therapy Charges for Today       Code Description Service Date Service Provider Modifiers Qty    63143440358 HC PT THER PROC EA 15 MIN 2023 Mckenna Wong PTA GP 1    47054725114 HC PT THER PROC EA 15 MIN 2023 Mckenna Wong, CURTIS GP 1    68691819275 HC PT THERAPEUTIC ACT EA 15 MIN 2023 Mckenna Wong PTA GP 1            PT G-Codes  Outcome Measure Options: AM-PAC 6 Clicks Basic Mobility (PT)  AM-PAC 6 Clicks Score (PT): 12  AM-PAC 6 Clicks Score (OT): 14    Mckenna Wong PTA  2023      Electronically signed by Mckenna Wong PTA at 23 0852       Mckenna Wong PTA at 23 1139  Version 1 of 1         Acute Care - Physical Therapy Treatment Note  [facility]     Patient Name: Jose Shaikh  : 1960  MRN: [id number]  Today's Date: 2023      Visit Dx:     ICD-10-CM ICD-9-CM   1. Acute febrile illness  R50.9 780.60   2. Sepsis without acute organ dysfunction, due to unspecified organism  A41.9 038.9     995.91   3. Pneumonia of right upper lobe due to infectious organism  J18.9 486   4. Uncontrolled type 2 diabetes mellitus with hyperglycemia  E11.65 250.02   5. Atrial fibrillation with rapid ventricular response  I48.91 427.31   6. Acute  osteomyelitis of right foot  M86.171 730.07   7. Decreased activities of daily living (ADL)  Z78.9 V49.89   8. Difficulty in walking  R26.2 719.7     Patient Active Problem List   Diagnosis    Diabetic ulcer of left heel associated with type 2 DM    Acute osteomyelitis of left calcaneus     Diabetic ulcer of left heel associated with type 2 DM    Diabetic ulcer of right midfoot associated with type 2 DM    Paroxysmal atrial fibrillation    Essential hypertension    Hyperlipidemia LDL goal <100    Cellulitis and abscess of foot    High alkaline phosphatase level    Osteomyelitis    Onychomycosis    Onychocryptosis    Foot pain, bilateral    Osteomyelitis of foot, right, acute    Cellulitis of right foot    Type 2 diabetes mellitus, with long-term current use of insulin    Class 3 severe obesity due to excess calories with serious comorbidity and body mass index (BMI) of 45.0 to 49.9 in adult    Anxiety disorder, unspecified    Claustrophobia    Dependence on wheelchair    Depression, unspecified    Long term (current) use of anticoagulants    Long term (current) use of oral hypoglycemic drugs    Wound of foot    Non-prs chronic ulcer oth prt r foot limited to brkdwn skin    Orthostatic hypotension    Other chronic osteomyelitis, right ankle and foot    Personal history of nicotine dependence    Thrombocytopenia, unspecified    Unspecified open wound, right foot, initial encounter    Diabetic foot infection    Subacute osteomyelitis of right foot    Right foot pain    Sepsis    Onychomycosis    Foot pain, left     Past Medical History:   Diagnosis Date    Absence of toe of right foot     Acute osteomyelitis of left calcaneus  8/18/2021    Anxiety and depression     Arthritis     Claustrophobia     Corns and callus     Diabetic ulcer of left heel associated with type 2 DM 8/18/2021    Diabetic ulcer of left heel associated with type 2 DM 7/6/2021    Diabetic ulcer of right midfoot associated with type 2 DM 8/18/2021     Difficulty walking     Essential hypertension 8/31/2021    Hammertoe     Hyperlipidemia LDL goal <100 8/31/2021    Ingrown toenail     Obesity     Paroxysmal atrial fibrillation 8/31/2021    Polyneuropathy     Pressure ulcer, stage 1     Tinea unguium     Type 2 diabetes mellitus with polyneuropathy      Past Surgical History:   Procedure Laterality Date    CYST REMOVAL      center of back; benign    INCISION AND DRAINAGE ABSCESS      back    INCISION AND DRAINAGE LEG Right 12/10/2021    Procedure: INCISION AND DRAINAGE LOWER EXTREMITY;  Surgeon: Ash Leyva DPM;  Location: [facility] MAIN OR;  Service: Podiatry;  Laterality: Right;    OTHER SURGICAL HISTORY      Surgical clips left foot    TOE SURGERY Right     Removal of 5th toe    TRANS METATARSAL AMPUTATION Right 12/2/2021    Procedure: AMPUTATION TRANS METATARSAL;  Surgeon: Ash Leyva DPM;  Location: [facility] MAIN OR;  Service: Podiatry;  Laterality: Right;    WRIST SURGERY Left     repair of injury     PT Assessment (last 12 hours)       PT Evaluation and Treatment       Row Name 06/01/23 1128          Physical Therapy Time and Intention    Subjective Information complains of;weakness;fatigue;pain  -DK     Document Type therapy note (daily note)  -DK     Mode of Treatment individual therapy;physical therapy  -DK     Patient Effort good  -DK     Symptoms Noted During/After Treatment fatigue;increased pain  -DK     Comment Pt was somewhat grumpy during treatment session.  He reports fearful of falling.  -       Row Name 06/01/23 1128          Pain    Pretreatment Pain Rating 7/10  -DK     Posttreatment Pain Rating 7/10  -DK     Pain Location - Side/Orientation Bilateral  -DK     Pain Location generalized  -DK     Pain Location - hip;knee;foot  -DK     Pain Intervention(s) Repositioned;Ambulation/increased activity;Distraction;Therapeutic presence  -       Row Name 06/01/23 1128          Cognition    Affect/Mental Status (Cognition) [facility]   -DK     Orientation Status (Cognition) oriented x 4  -DK     Follows Commands (Cognition) WFL  -DK     Cognitive Function WFL  -DK     Personal Safety Interventions gait belt;nonskid shoes/slippers when out of bed;supervised activity  -       Row Name 06/01/23 1128          Mobility    Extremity Weight-bearing Status left lower extremity;right lower extremity  -DK     Left Lower Extremity (Weight-bearing Status) non weight-bearing (NWB)  -DK     Right Lower Extremity (Weight-bearing Status) non weight-bearing (NWB)  -DK     Additional Documentation --  MD bradley right heel and left forefoot weight bearing.  -       Row Name 06/01/23 1128          Bed Mobility    Bed Mobility scooting/bridging;supine-sit-supine  -DK     All Activities, [place] (Bed Mobility) maximum assist (25% patient effort);2 person assist  -DK     Scooting/Bridging [place] (Bed Mobility) maximum assist (25% patient effort);2 person assist  -DK     Supine-Sit [place] (Bed Mobility) contact guard;1 person assist;standby assist  -DK     Sit-Supine [place] (Bed Mobility) contact guard;1 person assist;minimum assist (75% patient effort)  -DK     Supine-Sit-Supine [place] (Bed Mobility) contact guard;1 person assist;standby assist;minimum assist (75% patient effort)  -DK     Bed Mobility, Safety Issues decreased use of arms for pushing/pulling;decreased use of legs for bridging/pushing  -DK     Assistive Device (Bed Mobility) bed rails;draw sheet  -     Comment, (Bed Mobility) Pt sat EOB x 2-3 minutes with SBA  -       Row Name 06/01/23 1128          Transfers    Transfers sit-stand transfer;stand-sit transfer  -DK     Comment, (Transfers) Pt stood x 4-5 seconds rolling walker and room air, assist x 2 persons, then c/o pain, fear of falling and was returned to bed on alert post treatment.  He requires the bed level greatly elevated to attempt standing.  -       Row Name 06/01/23 1128          Sit-Stand Transfer     Sit-Stand [place] (Transfers) contact guard;minimum assist (75% patient effort);2 person assist  -     Assistive Device (Sit-Stand Transfers) walker, front-wheeled  -DK       Row Name 06/01/23 1128          Stand-Sit Transfer    Stand-Sit [place] (Transfers) contact guard;minimum assist (75% patient effort);2 person assist  -       Row Name 06/01/23 1128          Safety Issues, Functional Mobility    Safety Issues Affecting Function (Mobility) awareness of need for assistance;impulsivity;ability to follow commands;judgment;safety precaution awareness  -     Impairments Affecting Function (Mobility) balance;endurance/activity tolerance;pain;range of motion (ROM);strength  -       Row Name 06/01/23 1128          Balance    Balance Assessment sitting static balance;sitting dynamic balance;standing static balance  -     Static Sitting Balance standby assist  -     Dynamic Sitting Balance standby assist  -     Position, Sitting Balance unsupported;sitting edge of bed  -     Static Standing Balance minimal assist;moderate assist;2-person assist  -     Position/Device Used, Standing Balance walker, front-wheeled  -     Balance Interventions standing;supported;static;tandem standing  -       Row Name 06/01/23 1128          Motor Skills    Motor Skills --  therapeutic exercises  -     Coordination WFL  -     Therapeutic Exercise hip;knee;ankle  -       Row Name 06/01/23 1128          Hip (Therapeutic Exercise)    Hip (Therapeutic Exercise) AAROM (active assistive range of motion)  -     Hip AAROM (Therapeutic Exercise) bilateral;flexion;extension;aBduction;aDduction;supine;10 repetitions;2 sets  -       Row Name 06/01/23 1128          Knee (Therapeutic Exercise)    Knee (Therapeutic Exercise) AAROM (active assistive range of motion)  -     Knee AAROM (Therapeutic Exercise) bilateral;flexion;extension;supine;10 repetitions;2 sets  -       Row Name 06/01/23 1128          Ankle  (Therapeutic Exercise)    Ankle (Therapeutic Exercise) AROM (active range of motion)  -DK     Ankle AROM (Therapeutic Exercise) bilateral;dorsiflexion;plantarflexion;supine;20 repititions  -DK       Row Name             Wound 12/02/21 Right anterior foot Incision    Wound - Properties Group Placement Date: 12/02/21  -ES Side: Right  -ES Orientation: anterior  -ES Location: foot  -ES Primary Wound Type: Incision  -ES    Retired Wound - Properties Group Placement Date: 12/02/21  -ES Side: Right  -ES Orientation: anterior  -ES Location: foot  -ES Primary Wound Type: Incision  -ES    Retired Wound - Properties Group Date first assessed: 12/02/21  -ES Side: Right  -ES Location: foot  -ES Primary Wound Type: Incision  -ES      Row Name             Wound 06/22/21 1133 Left lateral heel    Wound - Properties Group Placement Date: 06/22/21  -FABIOLA Placement Time: 1133  -FABIOLA Present on Hospital Admission: Y  -FABIOLA Side: Left  -FABIOLA Orientation: lateral  -FABIOLA Location: heel  -FABIOLA    Retired Wound - Properties Group Placement Date: 06/22/21  -FABIOLA Placement Time: 1133  -FABIOLA Present on Hospital Admission: Y  -FABIOLA Side: Left  -FABIOLA Orientation: lateral  -FABIOLA Location: heel  -FABIOLA    Retired Wound - Properties Group Date first assessed: 06/22/21  -FABIOLA Time first assessed: 1133  -FABIOLA Present on Hospital Admission: Y  -FABIOLA Side: Left  -FABIOLA Location: heel  -FABIOLA      Row Name             Wound 05/19/23 1317 gluteal Blisters    Wound - Properties Group Placement Date: 05/19/23  -RASHARD Placement Time: 1317  -RASHARD Present on Hospital Admission: N  -RASHARD Location: gluteal  -RASHARD Primary Wound Type: Blisters  -RASHARD    Retired Wound - Properties Group Placement Date: 05/19/23  -RASHARD Placement Time: 1317  -RASHARD Present on Hospital Admission: N  -RASHARD Location: gluteal  -RASHARD Primary Wound Type: Blisters  -RASHARD    Retired Wound - Properties Group Date first assessed: 05/19/23  -RASHARD Time first assessed: 1317  -RASHARD Present on Hospital Admission: N  -RASHARD Location: gluteal  -RASHARD Primary Wound  Type: Blisters  -RASHARD      Row Name 06/01/23 1128          Plan of Care Review    Plan of Care Reviewed With patient  -DK     Progress no change  -DK       Row Name 06/01/23 1128          Positioning and Restraints    Pre-Treatment Position in bed  -DK     Post Treatment Position bed  -DK     In Bed supine;call light within reach;encouraged to call for assist;exit alarm on;legs elevated;heels elevated  side rails up x 4  -DK       Row Name 06/01/23 1128          Therapy Assessment/Plan (PT)    Rehab Potential (PT) fair, will monitor progress closely  -DK     Criteria for Skilled Interventions Met (PT) skilled treatment is necessary  -DK     Therapy Frequency (PT) daily  -DK     Problem List (PT) problems related to;balance;mobility;range of motion (ROM);strength;pain  -DK     Activity Limitations Related to Problem List (PT) unable to ambulate safely;unable to transfer safely  -DK       Row Name 06/01/23 1128          Progress Summary (PT)    Progress Toward Functional Goals (PT) progress toward functional goals is fair  -DK               User Key  (r) = Recorded By, (t) = Taken By, (c) = Cosigned By      Initials Name Provider Type    Karon Jordan, RN Registered Nurse    Mckenna Landaverde, CURTIS Physical Therapist Assistant    Marlen Vaughn RN Registered Nurse    Katlyn Garcia RN Registered Nurse                      PT Recommendation and Plan  Planned Therapy Interventions (PT): balance training, bed mobility training, gait training, home exercise program, strengthening, transfer training  Therapy Frequency (PT): daily  Progress Summary (PT)  Progress Toward Functional Goals (PT): progress toward functional goals is fair  Plan of Care Reviewed With: patient  Progress: no change   Outcome Measures       Row Name 06/01/23 1128 05/31/23 0844 05/30/23 1126       How much help from another person do you currently need...    Turning from your back to your side while in flat bed without using bedrails? 3   -DK 4  -DK 3  -DK    Moving from lying on back to sitting on the side of a flat bed without bedrails? 2  -DK 3  -DK 2  -DK    Moving to and from a bed to a chair (including a wheelchair)? 1  -DK 1  -DK 1  -DK    Standing up from a chair using your arms (e.g., wheelchair, bedside chair)? 2  -DK 2  -DK 1  -DK    Climbing 3-5 steps with a railing? 1  -DK 1  -DK 1  -DK    To walk in hospital room? 1  -DK 1  -DK 1  -DK    AM-PAC 6 Clicks Score (PT) 10  -DK 12  -DK 9  -DK       Functional Assessment    Outcome Measure Options AM-PAC 6 Clicks Basic Mobility (PT)  -DK AM-PAC 6 Clicks Basic Mobility (PT)  -DK AM-PAC 6 Clicks Basic Mobility (PT)  -DK              User Key  (r) = Recorded By, (t) = Taken By, (c) = Cosigned By      Initials Name Provider Type    Mckenna Landaverde PTA Physical Therapist Assistant                     Time Calculation:    PT Charges       Row Name 06/01/23 1137             Time Calculation    PT Received On 06/01/23  -DK      PT Goal Re-Cert Due Date 06/03/23  -DK         Timed Charges    10763 - PT Therapeutic Exercise Minutes 14  -DK      66087 - Gait Training Minutes  1  -DK      82136 - PT Therapeutic Activity Minutes 25  -DK         Total Minutes    Timed Charges Total Minutes 40  -DK       Total Minutes 40  -DK                User Key  (r) = Recorded By, (t) = Taken By, (c) = Cosigned By      Initials Name Provider Type    Mckenna Landaverde PTA Physical Therapist Assistant                  Therapy Charges for Today       Code Description Service Date Service Provider Modifiers Qty    65353341014 HC PT THER PROC EA 15 MIN 5/31/2023 Mckenna Wong, PTA GP 1    06866465368 HC PT THERAPEUTIC ACT EA 15 MIN 5/31/2023 Mckenna Wong, PTA GP 1    47531780974 HC PT THER PROC EA 15 MIN 6/1/2023 Mckenna Wong, PTA GP 1    63212088196 HC PT THERAPEUTIC ACT EA 15 MIN 6/1/2023 Mckenna Wong, PTA GP 2            PT G-Codes  Outcome Measure Options: AM-PAC 6 Clicks Basic Mobility (PT)  AM-PAC 6 Clicks  Score (PT): 10  AM-PAC 6 Clicks Score (OT): 14    Mckenna Wong, CURTIS  2023      Electronically signed by Mckenna Wong, PTA at 23 1138         Aguilar Scott, PT at 23 6145  Version 1 of       Attestation signed by Merlin Rao, PT at 23 0731              Acute Care - Physical Therapy Re-Evaluation   [facility]     Patient Name: Jose Shaikh  : 1960  MRN: [id number]  Today's Date: 2023      Visit Dx:     ICD-10-CM ICD-9-CM   1. Acute febrile illness  R50.9 780.60   2. Sepsis without acute organ dysfunction, due to unspecified organism  A41.9 038.9     995.91   3. Pneumonia of right upper lobe due to infectious organism  J18.9 486   4. Uncontrolled type 2 diabetes mellitus with hyperglycemia  E11.65 250.02   5. Atrial fibrillation with rapid ventricular response  I48.91 427.31   6. Acute osteomyelitis of right foot  M86.171 730.07   7. Decreased activities of daily living (ADL)  Z78.9 V49.89   8. Difficulty in walking  R26.2 719.7     Patient Active Problem List   Diagnosis    Diabetic ulcer of left heel associated with type 2 DM    Acute osteomyelitis of left calcaneus     Diabetic ulcer of left heel associated with type 2 DM    Diabetic ulcer of right midfoot associated with type 2 DM    Paroxysmal atrial fibrillation    Essential hypertension    Hyperlipidemia LDL goal <100    Cellulitis and abscess of foot    High alkaline phosphatase level    Osteomyelitis    Onychomycosis    Onychocryptosis    Foot pain, bilateral    Osteomyelitis of foot, right, acute    Cellulitis of right foot    Type 2 diabetes mellitus, with long-term current use of insulin    Class 3 severe obesity due to excess calories with serious comorbidity and body mass index (BMI) of 45.0 to 49.9 in adult    Anxiety disorder, unspecified    Claustrophobia    Dependence on wheelchair    Depression, unspecified    Long term (current) use of anticoagulants    Long term (current) use of oral hypoglycemic drugs     Wound of foot    Non-prs chronic ulcer oth prt r foot limited to brkdwn skin    Orthostatic hypotension    Other chronic osteomyelitis, right ankle and foot    Personal history of nicotine dependence    Thrombocytopenia, unspecified    Unspecified open wound, right foot, initial encounter    Diabetic foot infection    Subacute osteomyelitis of right foot    Right foot pain    Sepsis    Onychomycosis    Foot pain, left     Past Medical History:   Diagnosis Date    Absence of toe of right foot     Acute osteomyelitis of left calcaneus  8/18/2021    Anxiety and depression     Arthritis     Claustrophobia     Corns and callus     Diabetic ulcer of left heel associated with type 2 DM 8/18/2021    Diabetic ulcer of left heel associated with type 2 DM 7/6/2021    Diabetic ulcer of right midfoot associated with type 2 DM 8/18/2021    Difficulty walking     Essential hypertension 8/31/2021    Hammertoe     Hyperlipidemia LDL goal <100 8/31/2021    Ingrown toenail     Obesity     Paroxysmal atrial fibrillation 8/31/2021    Polyneuropathy     Pressure ulcer, stage 1     Tinea unguium     Type 2 diabetes mellitus with polyneuropathy      Past Surgical History:   Procedure Laterality Date    CYST REMOVAL      center of back; benign    INCISION AND DRAINAGE ABSCESS      back    INCISION AND DRAINAGE LEG Right 12/10/2021    Procedure: INCISION AND DRAINAGE LOWER EXTREMITY;  Surgeon: Ash Leyva DPM;  Location: [facility] MAIN OR;  Service: Podiatry;  Laterality: Right;    OTHER SURGICAL HISTORY      Surgical clips left foot    TOE SURGERY Right     Removal of 5th toe    TRANS METATARSAL AMPUTATION Right 12/2/2021    Procedure: AMPUTATION TRANS METATARSAL;  Surgeon: Ash Leyva DPM;  Location: [facility] OR;  Service: Podiatry;  Laterality: Right;    WRIST SURGERY Left     repair of injury     PT Assessment (last 12 hours)       PT Evaluation and Treatment       Row Name 06/05/23 9578          Physical Therapy  Time and Intention    Subjective Information no complaints (P)   -     Document Type re-evaluation (P)   -     Mode of Treatment individual therapy;physical therapy (P)   -     Patient Effort adequate (P)   -     Symptoms Noted During/After Treatment none (P)   -       Row Name 06/05/23 1500          General Information    Patient Profile Reviewed yes (P)   -     Patient Observations alert;cooperative;agree to therapy (P)   -       Row Name 06/05/23 1500          Motor Skills    Therapeutic Exercise hip;knee;ankle (P)   -       Row Name 06/05/23 1500          Hip (Therapeutic Exercise)    Hip (Therapeutic Exercise) AROM (active range of motion) (P)   -     Hip AROM (Therapeutic Exercise) bilateral;flexion;20 repititions (P)   -       Row Name 06/05/23 1500          Knee (Therapeutic Exercise)    Knee (Therapeutic Exercise) AROM (active range of motion) (P)   -     Knee AROM (Therapeutic Exercise) bilateral;SAQ (short arc quad);SLR (straight leg raise);heel slides;2 sets;10 repetitions (P)   -       Row Name 06/05/23 1500          Ankle (Therapeutic Exercise)    Ankle (Therapeutic Exercise) AROM (active range of motion) (P)   -     Ankle AROM (Therapeutic Exercise) left;dorsiflexion;plantarflexion;20 repititions (P)   -       Row Name             Wound 12/02/21 Right anterior foot Incision    Wound - Properties Group Placement Date: 12/02/21  -ES Side: Right  -ES Orientation: anterior  -ES Location: foot  -ES Primary Wound Type: Incision  -ES    Retired Wound - Properties Group Placement Date: 12/02/21  -ES Side: Right  -ES Orientation: anterior  -ES Location: foot  -ES Primary Wound Type: Incision  -ES    Retired Wound - Properties Group Date first assessed: 12/02/21  -ES Side: Right  -ES Location: foot  -ES Primary Wound Type: Incision  -ES      Row Name             Wound 06/22/21 1133 Left lateral heel    Wound - Properties Group Placement Date: 06/22/21  -FABIOLA Placement Time: 1133  -FABIOLA  Present on Hospital Admission: Y  -FABIOLA Side: Left  -FABIOLA Orientation: lateral  -FABIOLA Location: heel  -FABIOLA    Retired Wound - Properties Group Placement Date: 06/22/21  -FABIOLA Placement Time: 1133  -FABIOLA Present on Hospital Admission: Y  -FABIOLA Side: Left  -FABIOLA Orientation: lateral  -FABIOLA Location: heel  -FABIOLA    Retired Wound - Properties Group Date first assessed: 06/22/21  -FABIOLA Time first assessed: 1133  -FABIOLA Present on Hospital Admission: Y  -FABIOLA Side: Left  -FABIOLA Location: heel  -FABIOLA      Row Name             Wound 05/19/23 1317 gluteal Blisters    Wound - Properties Group Placement Date: 05/19/23  -RASHARD Placement Time: 1317  -RASHARD Present on Hospital Admission: N  -RASHARD Location: gluteal  -RASHARD Primary Wound Type: Blisters  -RASHARD    Retired Wound - Properties Group Placement Date: 05/19/23  -RASHARD Placement Time: 1317  -RASHARD Present on Hospital Admission: N  -RASHARD Location: gluteal  -RASHARD Primary Wound Type: Blisters  -RASHARD    Retired Wound - Properties Group Date first assessed: 05/19/23  -RASHARD Time first assessed: 1317  -RASHARD Present on Hospital Admission: N  -RASHARD Location: gluteal  -RASHARD Primary Wound Type: Blisters  -RASHARD      Row Name 06/05/23 1500          Positioning and Restraints    Pre-Treatment Position in bed (P)   -     Post Treatment Position bed (P)   -     In Bed call light within reach;exit alarm on (P)   -       Row Name 06/05/23 1500          Progress Summary (PT)    Progress Toward Functional Goals (PT) progress toward functional goals is fair (P)   -     Daily Progress Summary (PT) Pt presents with decreased strength and endurance in BLE. Pt only agreed to bed exercises due to weightbearing percautions he states the doctor gave him due to ulcers on both feet. Pt will continue to benefit from skilled physical therapy in order to increase transfer and bed mobility independence. Goals updated as necessary. (P)   -       Row Name 06/05/23 1500          Physical Therapy Goals    Bed Mobility Goal Selection (PT) bed mobility, PT goal 1 (P)    -     Transfer Goal Selection (PT) transfer, PT goal 1 (P)   -       Row Name 06/05/23 1500          Bed Mobility Goal 1 (PT)    Activity/Assistive Device (Bed Mobility Goal 1, PT) bed mobility activities, all (P)   -     [place] Level/Cues Needed (Bed Mobility Goal 1, PT) minimum assist (75% or more patient effort) (P)   -     Time Frame (Bed Mobility Goal 1, PT) 10 days (P)   -     Progress/Outcomes (Bed Mobility Goal 1, PT) progress slower than expected (P)   -       Row Name 06/05/23 1500          Transfer Goal 1 (PT)    Activity/Assistive Device (Transfer Goal 1, PT) bed-to-chair/chair-to-bed;sliding board (P)   -     [place] Level/Cues Needed (Transfer Goal 1, PT) maximum assist (25-49% patient effort) (P)   -     Time Frame (Transfer Goal 1, PT) long term goal (LTG);10 days (P)   -     Progress/Outcome (Transfer Goal 1, PT) progress slower than expected (P)   -               User Key  (r) = Recorded By, (t) = Taken By, (c) = Cosigned By      Initials Name Provider Type    Karon Jordan, RN Registered Nurse    Marlen Vaughn, RN Registered Nurse    Katlyn Garcia RN Registered Nurse    Aguilar Valladares, PT Physical Therapist                      PT Recommendation and Plan     Progress Summary (PT)  Progress Toward Functional Goals (PT): (P) progress toward functional goals is fair  Daily Progress Summary (PT): (P) Pt presents with decreased strength and endurance in BLE. Pt only agreed to bed exercises due to weightbearing percautions he states the doctor gave him due to ulcers on both feet. Pt will continue to benefit from skilled physical therapy in order to increase transfer and bed mobility independence. Goals updated as necessary.   Outcome Measures       Row Name 06/05/23 1500             How much help from another person do you currently need...    Turning from your back to your side while in flat bed without using bedrails? 3 (P)   -      Moving from  lying on back to sitting on the side of a flat bed without bedrails? 2 (P)   -JH      Moving to and from a bed to a chair (including a wheelchair)? 2 (P)   -JH      Standing up from a chair using your arms (e.g., wheelchair, bedside chair)? 2 (P)   -JH      Climbing 3-5 steps with a railing? 1 (P)   -JH      To walk in hospital room? 1 (P)   -JH      AM-PAC 6 Clicks Score (PT) 11 (P)   -JH         Functional Assessment    Outcome Measure Options AM-PAC 6 Clicks Basic Mobility (PT) (P)   -                User Key  (r) = Recorded By, (t) = Taken By, (c) = Cosigned By      Initials Name Provider Type    Aguilar Valladares, PT Physical Therapist                     Time Calculation:    PT Charges       Row Name 23 1532             Untimed Charges    PT Eval/Re-eval Minutes 25 (P)   -         Total Minutes    Untimed Charges Total Minutes 25 (P)   -JH       Total Minutes 25 (P)   -                User Key  (r) = Recorded By, (t) = Taken By, (c) = Cosigned By      Initials Name Provider Type    Aguilar Valladares, PT Physical Therapist                  Therapy Charges for Today       Code Description Service Date Service Provider Modifiers Qty    16503088231 HC PT RE-EVAL ESTABLISHED PLAN 2 2023 Aguilar Scott, MERLIN GP 1            PT G-Codes  Outcome Measure Options: (P) AM-PAC 6 Clicks Basic Mobility (PT)  AM-PAC 6 Clicks Score (PT): (P) 11  AM-PAC 6 Clicks Score (OT): 14    Aguilar Scott PT  2023      Electronically signed by Merlin Rao PT at 23 0731          Occupational Therapy Notes (most recent note)        Michelle Howard, OT at 23 1440          Patient Name: Jose Shaikh  : 1960    MRN: [id number]                              Today's Date: 2023       Admit Date: 2023    Visit Dx:     ICD-10-CM ICD-9-CM   1. Acute febrile illness  R50.9 780.60   2. Sepsis without acute organ dysfunction, due to unspecified organism  A41.9 038.9     995.91   3. Pneumonia of right  upper lobe due to infectious organism  J18.9 486   4. Uncontrolled type 2 diabetes mellitus with hyperglycemia  E11.65 250.02   5. Atrial fibrillation with rapid ventricular response  I48.91 427.31   6. Acute osteomyelitis of right foot  M86.171 730.07   7. Decreased activities of daily living (ADL)  Z78.9 V49.89   8. Difficulty in walking  R26.2 719.7     Patient Active Problem List   Diagnosis    Diabetic ulcer of left heel associated with type 2 DM    Acute osteomyelitis of left calcaneus     Diabetic ulcer of left heel associated with type 2 DM    Diabetic ulcer of right midfoot associated with type 2 DM    Paroxysmal atrial fibrillation    Essential hypertension    Hyperlipidemia LDL goal <100    Cellulitis and abscess of foot    High alkaline phosphatase level    Osteomyelitis    Onychomycosis    Onychocryptosis    Foot pain, bilateral    Osteomyelitis of foot, right, acute    Cellulitis of right foot    Type 2 diabetes mellitus, with long-term current use of insulin    Class 3 severe obesity due to excess calories with serious comorbidity and body mass index (BMI) of 45.0 to 49.9 in adult    Anxiety disorder, unspecified    Claustrophobia    Dependence on wheelchair    Depression, unspecified    Long term (current) use of anticoagulants    Long term (current) use of oral hypoglycemic drugs    Wound of foot    Non-prs chronic ulcer oth prt r foot limited to brkdwn skin    Orthostatic hypotension    Other chronic osteomyelitis, right ankle and foot    Personal history of nicotine dependence    Thrombocytopenia, unspecified    Unspecified open wound, right foot, initial encounter    Diabetic foot infection    Subacute osteomyelitis of right foot    Right foot pain    Sepsis    Onychomycosis    Foot pain, left     Past Medical History:   Diagnosis Date    Absence of toe of right foot     Acute osteomyelitis of left calcaneus  8/18/2021    Anxiety and depression     Arthritis     Claustrophobia     Corns and  callus     Diabetic ulcer of left heel associated with type 2 DM 8/18/2021    Diabetic ulcer of left heel associated with type 2 DM 7/6/2021    Diabetic ulcer of right midfoot associated with type 2 DM 8/18/2021    Difficulty walking     Essential hypertension 8/31/2021    Hammertoe     Hyperlipidemia LDL goal <100 8/31/2021    Ingrown toenail     Obesity     Paroxysmal atrial fibrillation 8/31/2021    Polyneuropathy     Pressure ulcer, stage 1     Tinea unguium     Type 2 diabetes mellitus with polyneuropathy      Past Surgical History:   Procedure Laterality Date    CYST REMOVAL      center of back; benign    INCISION AND DRAINAGE ABSCESS      back    INCISION AND DRAINAGE LEG Right 12/10/2021    Procedure: INCISION AND DRAINAGE LOWER EXTREMITY;  Surgeon: Ash Leyva DPM;  Location: [facility] MAIN OR;  Service: Podiatry;  Laterality: Right;    OTHER SURGICAL HISTORY      Surgical clips left foot    TOE SURGERY Right     Removal of 5th toe    TRANS METATARSAL AMPUTATION Right 12/2/2021    Procedure: AMPUTATION TRANS METATARSAL;  Surgeon: Ash Leyva DPM;  Location: [facility] MAIN OR;  Service: Podiatry;  Laterality: Right;    WRIST SURGERY Left     repair of injury      General Information       Row Name 05/24/23 1438 05/24/23 1420       OT Time and Intention    Document Type therapy note (daily note)  - re-evaluation  -    Mode of Treatment individual therapy;occupational therapy  - individual therapy;occupational therapy  -      Row Name 05/24/23 1420          General Information    Existing Precautions/Restrictions fall  -       Row Name 05/24/23 1420          Cognition    Orientation Status (Cognition) oriented x 4  -       Row Name 05/24/23 1420          Safety Issues, Functional Mobility    Impairments Affecting Function (Mobility) balance;endurance/activity tolerance;pain;range of motion (ROM);strength  -               User Key  (r) = Recorded By, (t) = Taken By, (c) =  Cosigned By      Initials Name Provider Type    Michelle Chaney OT Occupational Therapist                     Mobility/ADL's       [facility] Name 05/24/23 1439          Bed Mobility    Comment, (Bed Mobility) Declined transfers this session. Will continue to assess.  -LF       Row Name 05/24/23 [facility]9          Activities of Daily Living    BADL Assessment/Intervention bathing;upper body dressing;lower body dressing;grooming;feeding;toileting  -LF       Row Name 05/24/23 1439          Mobility    Extremity Weight-bearing Status left lower extremity;right lower extremity  -     Left Lower Extremity (Weight-bearing Status) non weight-bearing (NWB)  Pt can be R heel/L forefoot WB as needed.  -     Right Lower Extremity (Weight-bearing Status) non weight-bearing (NWB)  Pt can be R heel/L forefoot WB as needed.  -LF       Row Name 05/24/23 [facility]9          Bathing Assessment/Intervention    [place] Level (Bathing) bathing skills;maximum assist (25% patient effort)  -LF       Row Name 05/24/23 [facility]          Upper Body Dressing Assessment/Training    [place] Level (Upper Body Dressing) upper body dressing skills;minimum assist (75% patient effort)  -LF       Row Name 05/24/23 [facility]          Lower Body Dressing Assessment/Training    [place] Level (Lower Body Dressing) lower body dressing skills;dependent (less than 25% patient effort)  -LF       Row Name 05/24/23 [facility]          Grooming Assessment/Training    [place] Level (Grooming) grooming skills;set up  -LF       Row Name 05/24/23 [facility]9          Toileting Assessment/Training    [place] Level (Toileting) toileting skills;dependent (less than 25% patient effort)  -LF       Row Name 05/24/23 143          Self-Feeding Assessment/Training    [place] Level (Feeding) feeding skills;set up  -               User Key  (r) = Recorded By, (t) = Taken By, (c) = Cosigned By      Initials Name Provider Type    Michelle Chaney OT Occupational Therapist                    Obj/Interventions       [facility] Name 05/24/23 1440          Sensory Assessment (Somatosensory)    Sensory Assessment (Somatosensory) UE sensation intact  -LF       Row Name 05/24/23 1440          Vision Assessment/Intervention    Visual Impairment/Limitations WFL  -LF       Row Name 05/24/23 1440          Range of Motion Comprehensive    General Range of Motion no range of motion deficits identified  -LF       Row Name 05/24/23 1440          Strength Comprehensive (MMT)    Comment, General Manual Muscle Testing (MMT) Assessment 4+/5 LUE and 3+/5 RUE  -LF       Row Name 05/24/23 1420          Shoulder (Therapeutic Exercise)    Shoulder (Therapeutic Exercise) strengthening exercise  -     Shoulder Strengthening (Therapeutic Exercise) bilateral;resistance band;yellow;20 repititions;2 sets  Shoulder flexion/extension, forward reach, and horizontal abduction/adduction 20 reps x 2 sets. Unable to perform overhead shoulder flexion/extension using RUE due to prior impairment.  -LF       Row Name 05/24/23 1420          Elbow/Forearm (Therapeutic Exercise)    Elbow/Forearm (Therapeutic Exercise) strengthening exercise  -     Elbow/Forearm Strengthening (Therapeutic Exercise) bilateral;flexion;extension;resistance band;yellow;20 repititions;2 sets  -LF       Row Name 05/24/23 1440 05/24/23 1420       Motor Skills    Motor Skills coordination;functional endurance  -LF functional endurance  -LF    Coordination WFL  -LF --    Functional Endurance Fair  -LF Fair  -LF    Therapeutic Exercise -- shoulder;elbow/forearm  -LF      Row Name 05/24/23 1440          Balance    Comment, Balance Not assessed, likely impaired.  -               User Key  (r) = Recorded By, (t) = Taken By, (c) = Cosigned By      Initials Name Provider Type     Michelle Howard OT Occupational Therapist                   Goals/Plan       Row Name 05/24/23 1430          Bed Mobility Goal 1 (OT)    Activity/Assistive Device (Bed Mobility  Goal 1, OT) bed mobility activities, all  -LF     [place] Level/Cues Needed (Bed Mobility Goal 1, OT) modified independence  -LF     Time Frame (Bed Mobility Goal 1, OT) long term goal (LTG);10 days  -LF     Progress/Outcomes (Bed Mobility Goal 1, OT) new goal  -LF       Row Name 05/24/23 1430          Transfer Goal 1 (OT)    Activity/Assistive Device (Transfer Goal 1, OT) transfers, all  -LF     [place] Level/Cues Needed (Transfer Goal 1, OT) modified independence  -LF     Time Frame (Transfer Goal 1, OT) long term goal (LTG);10 days  -LF     Progress/Outcome (Transfer Goal 1, OT) continuing progress toward goal  -LF       Row Name 05/24/23 1430          Bathing Goal 1 (OT)    Activity/Device (Bathing Goal 1, OT) bathing skills, all  -LF     [place] Level/Cues Needed (Bathing Goal 1, OT) modified independence  -LF     Time Frame (Bathing Goal 1, OT) long term goal (LTG);10 days  -LF     Progress/Outcomes (Bathing Goal 1, OT) continuing progress toward goal  -LF       Row Name 05/24/23 1430          Dressing Goal 1 (OT)    Activity/Device (Dressing Goal 1, OT) dressing skills, all  -LF     [place]/Cues Needed (Dressing Goal 1, OT) modified independence  -LF     Time Frame (Dressing Goal 1, OT) long term goal (LTG);10 days  -LF     Progress/Outcome (Dressing Goal 1, OT) continuing progress toward goal  -LF       Row Name 05/24/23 1430          Toileting Goal 1 (OT)    Activity/Device (Toileting Goal 1, OT) toileting skills, all  -LF     [place] Level/Cues Needed (Toileting Goal 1, OT) modified independence  -LF     Time Frame (Toileting Goal 1, OT) long term goal (LTG);10 days  -LF     Progress/Outcome (Toileting Goal 1, OT) continuing progress toward goal  -LF       Row Name 05/24/23 1430          Grooming Goal 1 (OT)    Activity/Device (Grooming Goal 1, OT) grooming skills, all  -LF     [place] (Grooming Goal 1, OT) modified independence  -LF     Time Frame (Grooming Goal 1, OT) long  term goal (LTG);10 days  -LF     Progress/Outcome (Grooming Goal 1, OT) continuing progress toward goal  -LF       Row Name 05/24/23 1430          Strength Goal 1 (OT)    Strength Goal 1 (OT) Patient will improve right shoulder strength to 4/5 to support independence with self-care activities and functional transfers  -LF     Time Frame (Strength Goal 1, OT) long term goal (LTG);10 days  -LF     Progress/Outcome (Strength Goal 1, OT) continuing progress toward goal  -LF       Row Name 05/24/23 1430          Problem Specific Goal 1 (OT)    Problem Specific Goal 1 (OT) Patient will improve activity tolerance to fair plus to support independence with ADL activities  -LF     Time Frame (Problem Specific Goal 1, OT) long term goal (LTG);10 days  -LF     Progress/Outcome (Problem Specific Goal 1, OT) continuing progress toward goal  -LF       Row Name 05/24/23 1430          Therapy Assessment/Plan (OT)    Planned Therapy Interventions (OT) activity tolerance training;patient/caregiver education/training;BADL retraining;functional balance retraining;occupation/activity based interventions;strengthening exercise;transfer/mobility retraining  -LF               User Key  (r) = Recorded By, (t) = Taken By, (c) = Cosigned By      Initials Name Provider Type    LF Michelle Howard OT Occupational Therapist                   Clinical Impression       Row Name 05/24/23 1423          Pain Assessment    Additional Documentation Pain Scale: FACES Pre/Post-Treatment (Group)  -LF       Row Name 05/24/23 1423          Pain Scale: FACES Pre/Post-Treatment    Pain: FACES Scale, Pretreatment 0-->no hurt  -LF     Posttreatment Pain Rating 2-->hurts little bit  -LF     Pain Location - Side/Orientation Right  -LF     Pain Location - shoulder  -LF       Row Name 05/24/23 1423          Plan of Care Review    Plan of Care Reviewed With patient  -LF     Progress improving  -LF     Outcome Evaluation Patient continues to present with limitations  in self-care, functional transfers, balance, and endurance. He would benefit from continued skilled occupational therapy services to maximize independence with ADLs/functional transfers. Subacute rehab recommended upon discharge from hospital. Bed mobility goal added, otherwise continue with established plan of care and goals.  -       Row Name 05/24/23 1423          Therapy Assessment/Plan (OT)    Rehab Potential (OT) good, to achieve stated therapy goals  -     Criteria for Skilled Therapeutic Interventions Met (OT) yes;meets criteria;skilled treatment is necessary  -     Therapy Frequency (OT) 5 times/wk  -       Row Name 05/24/23 1423          Therapy Plan Review/Discharge Plan (OT)    Anticipated Discharge Disposition (OT) sub acute care setting  -       Row Name 05/24/23 1423          Vital Signs    O2 Delivery Pre Treatment room air  -LF     O2 Delivery Intra Treatment room air  -LF     O2 Delivery Post Treatment room air  -LF               User Key  (r) = Recorded By, (t) = Taken By, (c) = Cosigned By      Initials Name Provider Type     Michelle Howard, OT Occupational Therapist                   Outcome Measures       Row Name 05/24/23 1425          How much help from another is currently needed...    Putting on and taking off regular lower body clothing? 1  -LF     Bathing (including washing, rinsing, and drying) 2  -LF     Toileting (which includes using toilet bed pan or urinal) 1  -LF     Putting on and taking off regular upper body clothing 3  -LF     Taking care of personal grooming (such as brushing teeth) 3  -LF     Eating meals 4  -LF     AM-PAC 6 Clicks Score (OT) 14  -LF       Row Name 05/24/23 1425          Functional Assessment    Outcome Measure Options AM-PAC 6 Clicks Daily Activity (OT);Optimal Instrument  -       Row Name 05/24/23 1425          Optimal Instrument    Optimal Instrument Optimal - 3  -LF     Bending/Stooping 5  -LF     Standing 5  -LF     Reaching 1  -LF      From the list, choose the 3 activities you would most like to be able to do without any difficulty Bending/stooping;Standing;Reaching  -LF     Total Score Optimal - 3 11  -LF               User Key  (r) = Recorded By, (t) = Taken By, (c) = Cosigned By      Initials Name Provider Type     Michelle Howard OT Occupational Therapist                    Occupational Therapy Education       Title: PT OT SLP Therapies (Done)       Topic: Occupational Therapy (Done)       Point: ADL training (Done)       Description:   Instruct learner(s) on proper safety adaptation and remediation techniques during self care or transfers.   Instruct in proper use of assistive devices.                  Learning Progress Summary             Patient Acceptance, E,D, DU by PG at 5/15/2023 1303                         Point: Home exercise program (Done)       Description:   Instruct learner(s) on appropriate technique for monitoring, assisting and/or progressing therapeutic exercises/activities.                  Learning Progress Summary             Patient Acceptance, E,D, DU by PG at 5/15/2023 1303                         Point: Precautions (Done)       Description:   Instruct learner(s) on prescribed precautions during self-care and functional transfers.                  Learning Progress Summary             Patient Acceptance, E,D, DU by PG at 5/15/2023 1303                         Point: Body mechanics (Done)       Description:   Instruct learner(s) on proper positioning and spine alignment during self-care, functional mobility activities and/or exercises.                  Learning Progress Summary             Patient Acceptance, E,D, DU by PG at 5/15/2023 1303                                         User Key       Initials Effective Dates Name Provider Type Discipline    PG 06/16/21 -  [place] OT Occupational Therapist OT                  OT Recommendation and Plan  Planned Therapy Interventions (OT): activity tolerance  training, patient/caregiver education/training, BADL retraining, functional balance retraining, occupation/activity based interventions, strengthening exercise, transfer/mobility retraining  Therapy Frequency (OT): 5 times/wk  Plan of Care Review  Plan of Care Reviewed With: patient  Progress: improving  Outcome Evaluation: Patient continues to present with limitations in self-care, functional transfers, balance, and endurance. He would benefit from continued skilled occupational therapy services to maximize independence with ADLs/functional transfers. Subacute rehab recommended upon discharge from hospital. Bed mobility goal added, otherwise continue with established plan of care and goals.     Time Calculation:    Time Calculation- OT       Row Name 05/24/23 1428             Time Calculation- OT    OT Received On 05/24/23  -LF      OT Goal Re-Cert Due Date 06/02/23  -LF         Timed Charges    51001 - OT Therapeutic Exercise Minutes 10  -LF         Untimed Charges    OT Eval/Re-eval Minutes 10  -LF         Total Minutes    Timed Charges Total Minutes 10  -LF      Untimed Charges Total Minutes 10  -LF       Total Minutes 20  -LF                User Key  (r) = Recorded By, (t) = Taken By, (c) = Cosigned By      Initials Name Provider Type    LF Michelle Howard OT Occupational Therapist                  Therapy Charges for Today       Code Description Service Date Service Provider Modifiers Qty    53516287498 HC OT THER PROC EA 15 MIN 5/24/2023 Michelle Howard OT GO 1                 Michelle Howard OT  5/24/2023    Electronically signed by Michelle Howard OT at 05/24/23 3000

## 2023-06-06 NOTE — PLAN OF CARE
Goal Outcome Evaluation:  Alert and oriented x 4. Blood glucose monitored. Medicated for pain, muscle spasms, and diarrhea per orders. Wound/skin care performed per orders this shift. No other complaints at this time.

## 2023-06-06 NOTE — PLAN OF CARE
Goal Outcome Evaluation:   Patient a&ox4. Medicated once with prn oxycodone per MAR instructions. Wound dressings dry and intact. Fluid restriction still in place and followed. No other complaints per the patient this shift.

## 2023-06-07 LAB
GLUCOSE BLDC GLUCOMTR-MCNC: 209 MG/DL (ref 70–99)
GLUCOSE BLDC GLUCOMTR-MCNC: 228 MG/DL (ref 70–99)
GLUCOSE BLDC GLUCOMTR-MCNC: 280 MG/DL (ref 70–99)
GLUCOSE BLDC GLUCOMTR-MCNC: 287 MG/DL (ref 70–99)

## 2023-06-07 PROCEDURE — 82948 REAGENT STRIP/BLOOD GLUCOSE: CPT

## 2023-06-07 PROCEDURE — 97110 THERAPEUTIC EXERCISES: CPT

## 2023-06-07 PROCEDURE — 94799 UNLISTED PULMONARY SVC/PX: CPT

## 2023-06-07 PROCEDURE — 63710000001 INSULIN LISPRO (HUMAN) PER 5 UNITS: Performed by: INTERNAL MEDICINE

## 2023-06-07 PROCEDURE — 63710000001 INSULIN LISPRO (HUMAN) PER 5 UNITS

## 2023-06-07 PROCEDURE — 63710000001 INSULIN DETEMIR PER 5 UNITS: Performed by: INTERNAL MEDICINE

## 2023-06-07 PROCEDURE — 99233 SBSQ HOSP IP/OBS HIGH 50: CPT | Performed by: INTERNAL MEDICINE

## 2023-06-07 RX ORDER — SIMETHICONE 80 MG
80 TABLET,CHEWABLE ORAL
Status: DISCONTINUED | OUTPATIENT
Start: 2023-06-07 | End: 2023-06-15 | Stop reason: HOSPADM

## 2023-06-07 RX ORDER — MIDODRINE HYDROCHLORIDE 2.5 MG/1
2.5 TABLET ORAL
Status: DISCONTINUED | OUTPATIENT
Start: 2023-06-07 | End: 2023-06-08

## 2023-06-07 RX ADMIN — INSULIN LISPRO 3 UNITS: 100 INJECTION, SOLUTION INTRAVENOUS; SUBCUTANEOUS at 21:45

## 2023-06-07 RX ADMIN — AMOXICILLIN 1000 MG: 500 CAPSULE ORAL at 21:58

## 2023-06-07 RX ADMIN — SIMETHICONE 80 MG: 80 TABLET, CHEWABLE ORAL at 21:57

## 2023-06-07 RX ADMIN — APIXABAN 5 MG: 5 TABLET, FILM COATED ORAL at 08:14

## 2023-06-07 RX ADMIN — MIDODRINE HYDROCHLORIDE 2.5 MG: 2.5 TABLET ORAL at 17:23

## 2023-06-07 RX ADMIN — Medication 250 MG: at 08:14

## 2023-06-07 RX ADMIN — Medication 10 MG: at 21:44

## 2023-06-07 RX ADMIN — MIDODRINE HYDROCHLORIDE 5 MG: 5 TABLET ORAL at 08:16

## 2023-06-07 RX ADMIN — SIMETHICONE 80 MG: 80 TABLET, CHEWABLE ORAL at 17:23

## 2023-06-07 RX ADMIN — INSULIN LISPRO 4 UNITS: 100 INJECTION, SOLUTION INTRAVENOUS; SUBCUTANEOUS at 17:23

## 2023-06-07 RX ADMIN — DILTIAZEM HYDROCHLORIDE 120 MG: 120 CAPSULE, COATED, EXTENDED RELEASE ORAL at 08:14

## 2023-06-07 RX ADMIN — INSULIN LISPRO 3 UNITS: 100 INJECTION, SOLUTION INTRAVENOUS; SUBCUTANEOUS at 08:15

## 2023-06-07 RX ADMIN — INSULIN DETEMIR 48 UNITS: 100 INJECTION, SOLUTION SUBCUTANEOUS at 21:45

## 2023-06-07 RX ADMIN — SIMETHICONE 80 MG: 80 TABLET, CHEWABLE ORAL at 14:24

## 2023-06-07 RX ADMIN — INSULIN LISPRO 8 UNITS: 100 INJECTION, SOLUTION INTRAVENOUS; SUBCUTANEOUS at 17:24

## 2023-06-07 RX ADMIN — DIGOXIN 125 MCG: 125 TABLET ORAL at 11:49

## 2023-06-07 RX ADMIN — INSULIN LISPRO 4 UNITS: 100 INJECTION, SOLUTION INTRAVENOUS; SUBCUTANEOUS at 11:49

## 2023-06-07 RX ADMIN — INSULIN DETEMIR 48 UNITS: 100 INJECTION, SOLUTION SUBCUTANEOUS at 08:15

## 2023-06-07 RX ADMIN — INSULIN LISPRO 8 UNITS: 100 INJECTION, SOLUTION INTRAVENOUS; SUBCUTANEOUS at 08:15

## 2023-06-07 RX ADMIN — PREGABALIN 25 MG: 25 CAPSULE ORAL at 05:53

## 2023-06-07 RX ADMIN — CYCLOBENZAPRINE 10 MG: 10 TABLET, FILM COATED ORAL at 08:13

## 2023-06-07 RX ADMIN — AMOXICILLIN 1000 MG: 500 CAPSULE ORAL at 05:53

## 2023-06-07 RX ADMIN — PREGABALIN 25 MG: 25 CAPSULE ORAL at 21:44

## 2023-06-07 RX ADMIN — MIDODRINE HYDROCHLORIDE 2.5 MG: 2.5 TABLET ORAL at 11:49

## 2023-06-07 RX ADMIN — OXYCODONE HYDROCHLORIDE 10 MG: 5 TABLET ORAL at 08:14

## 2023-06-07 RX ADMIN — Medication 250 MG: at 21:57

## 2023-06-07 RX ADMIN — METOPROLOL SUCCINATE 37.5 MG: 25 TABLET, EXTENDED RELEASE ORAL at 21:44

## 2023-06-07 RX ADMIN — ATORVASTATIN CALCIUM 10 MG: 10 TABLET, FILM COATED ORAL at 21:58

## 2023-06-07 RX ADMIN — FAMOTIDINE 40 MG: 20 TABLET ORAL at 08:14

## 2023-06-07 RX ADMIN — APIXABAN 5 MG: 5 TABLET, FILM COATED ORAL at 21:44

## 2023-06-07 RX ADMIN — PREGABALIN 25 MG: 25 CAPSULE ORAL at 14:22

## 2023-06-07 RX ADMIN — AMOXICILLIN 1000 MG: 500 CAPSULE ORAL at 14:22

## 2023-06-07 RX ADMIN — OXYCODONE HYDROCHLORIDE 10 MG: 5 TABLET ORAL at 17:23

## 2023-06-07 RX ADMIN — OXYCODONE HYDROCHLORIDE 10 MG: 5 TABLET ORAL at 00:29

## 2023-06-07 RX ADMIN — METOPROLOL SUCCINATE 37.5 MG: 25 TABLET, EXTENDED RELEASE ORAL at 08:14

## 2023-06-07 RX ADMIN — DICLOFENAC 2 G: 10 GEL TOPICAL at 21:58

## 2023-06-07 RX ADMIN — INSULIN LISPRO 8 UNITS: 100 INJECTION, SOLUTION INTRAVENOUS; SUBCUTANEOUS at 11:49

## 2023-06-07 NOTE — PLAN OF CARE
Goal Outcome Evaluation:  Alert and oriented x 4. Pt refused repositioning throughout shift, education/encouragement provided. Blood glucose monitored. Skin/wound care performed per orders. Medicated for pain and muscle spasms per orders. No other complaints at this time.

## 2023-06-07 NOTE — THERAPY TREATMENT NOTE
Acute Care - Physical Therapy Treatment Note  KEO Dominguez     Patient Name: Jose Shaikh  : 1960  MRN: [id number]  Today's Date: 2023      Visit Dx:     ICD-10-CM ICD-9-CM   1. Acute febrile illness  R50.9 780.60   2. Sepsis without acute organ dysfunction, due to unspecified organism  A41.9 038.9     995.91   3. Pneumonia of right upper lobe due to infectious organism  J18.9 486   4. Uncontrolled type 2 diabetes mellitus with hyperglycemia  E11.65 250.02   5. Atrial fibrillation with rapid ventricular response  I48.91 427.31   6. Acute osteomyelitis of right foot  M86.171 730.07   7. Decreased activities of daily living (ADL)  Z78.9 V49.89   8. Difficulty in walking  R26.2 719.7     Patient Active Problem List   Diagnosis    Diabetic ulcer of left heel associated with type 2 DM    Acute osteomyelitis of left calcaneus     Diabetic ulcer of left heel associated with type 2 DM    Diabetic ulcer of right midfoot associated with type 2 DM    Paroxysmal atrial fibrillation    Essential hypertension    Hyperlipidemia LDL goal <100    Cellulitis and abscess of foot    High alkaline phosphatase level    Osteomyelitis    Onychomycosis    Onychocryptosis    Foot pain, bilateral    Osteomyelitis of foot, right, acute    Cellulitis of right foot    Type 2 diabetes mellitus, with long-term current use of insulin    Class 3 severe obesity due to excess calories with serious comorbidity and body mass index (BMI) of 45.0 to 49.9 in adult    Anxiety disorder, unspecified    Claustrophobia    Dependence on wheelchair    Depression, unspecified    Long term (current) use of anticoagulants    Long term (current) use of oral hypoglycemic drugs    Wound of foot    Non-prs chronic ulcer oth prt r foot limited to brkdwn skin    Orthostatic hypotension    Other chronic osteomyelitis, right ankle and foot    Personal history of nicotine dependence    Thrombocytopenia, unspecified    Unspecified open wound, right foot, initial  encounter    Diabetic foot infection    Subacute osteomyelitis of right foot    Right foot pain    Sepsis    Onychomycosis    Foot pain, left     Past Medical History:   Diagnosis Date    Absence of toe of right foot     Acute osteomyelitis of left calcaneus  8/18/2021    Anxiety and depression     Arthritis     Claustrophobia     Corns and callus     Diabetic ulcer of left heel associated with type 2 DM 8/18/2021    Diabetic ulcer of left heel associated with type 2 DM 7/6/2021    Diabetic ulcer of right midfoot associated with type 2 DM 8/18/2021    Difficulty walking     Essential hypertension 8/31/2021    Hammertoe     Hyperlipidemia LDL goal <100 8/31/2021    Ingrown toenail     Obesity     Paroxysmal atrial fibrillation 8/31/2021    Polyneuropathy     Pressure ulcer, stage 1     Tinea unguium     Type 2 diabetes mellitus with polyneuropathy      Past Surgical History:   Procedure Laterality Date    CYST REMOVAL      center of back; benign    INCISION AND DRAINAGE ABSCESS      back    INCISION AND DRAINAGE LEG Right 12/10/2021    Procedure: INCISION AND DRAINAGE LOWER EXTREMITY;  Surgeon: Ash Leyva DPM;  Location: [facility];  Service: Podiatry;  Laterality: Right;    OTHER SURGICAL HISTORY      Surgical clips left foot    TOE SURGERY Right     Removal of 5th toe    TRANS METATARSAL AMPUTATION Right 12/2/2021    Procedure: AMPUTATION TRANS METATARSAL;  Surgeon: Ash Leyva DPM;  Location: [facility] OR;  Service: Podiatry;  Laterality: Right;    WRIST SURGERY Left     repair of injury     PT Assessment (last 12 hours)       PT Evaluation and Treatment       Row Name 06/07/23 1123          Physical Therapy Time and Intention    Subjective Information complains of;weakness;fatigue;pain  diarrhea/constipation  -DK     Document Type therapy note (daily note)  -DK     Mode of Treatment individual therapy;physical therapy  -DK     Patient Effort good  -DK     Symptoms Noted During/After  "Treatment fatigue;increased pain  -DK     Comment Pt was c/o alternating diarrhea/constipation.  -       Row Name 06/07/23 1123          Pain    Pretreatment Pain Rating 6/10  -DK     Posttreatment Pain Rating 6/10  -DK     Pain Location - Side/Orientation Bilateral  -DK     Pain Location generalized  -DK     Pain Location - back;hip;knee;foot  -DK     Pain Intervention(s) Distraction;Therapeutic presence  -DK       Row Name 06/07/23 1123          Cognition    Affect/Mental Status (Cognition) WFL  -DK     Orientation Status (Cognition) oriented x 4  -DK     Follows Commands (Cognition) WFL  -DK     Cognitive Function WFL  -DK     Personal Safety Interventions gait belt;nonskid shoes/slippers when out of bed;supervised activity  -       Row Name 06/07/23 1123          Mobility    Extremity Weight-bearing Status left lower extremity;right lower extremity  -DK     Left Lower Extremity (Weight-bearing Status) non weight-bearing (NWB)  -DK     Right Lower Extremity (Weight-bearing Status) non weight-bearing (NWB)  -DK     Additional Documentation --  If necessary, pt \"can\" be right heel and left forefoot weight bearing, per MDs.  -       Row Name 06/07/23 1123          Bed Mobility    Bed Mobility scooting/bridging;supine-sit-supine  -DK     All Activities, [place] (Bed Mobility) maximum assist (25% patient effort);2 person assist  -DK     Scooting/Bridging [place] (Bed Mobility) maximum assist (25% patient effort);2 person assist  -DK     Supine-Sit [place] (Bed Mobility) contact guard;1 person assist;standby assist  -DK     Sit-Supine [place] (Bed Mobility) contact guard;1 person assist;minimum assist (75% patient effort)  -DK     Supine-Sit-Supine [place] (Bed Mobility) contact guard;1 person assist;standby assist;minimum assist (75% patient effort)  -DK     Bed Mobility, Safety Issues decreased use of arms for pushing/pulling;decreased use of legs for bridging/pushing  -DK     Assistive " Device (Bed Mobility) bed rails;draw sheet  -       Row Name 06/07/23 1123          Transfers    Transfers sit-stand transfer;stand-sit transfer  -       Row Name 06/07/23 1123          Sit-Stand Transfer    Sit-Stand [place] (Transfers) contact guard;minimum assist (75% patient effort);2 person assist  -     Assistive Device (Sit-Stand Transfers) walker, front-wheeled  -       Row Name 06/07/23 1123          Stand-Sit Transfer    Stand-Sit [place] (Transfers) contact guard;minimum assist (75% patient effort);2 person assist  -       Row Name 06/07/23 1123          Safety Issues, Functional Mobility    Safety Issues Affecting Function (Mobility) impulsivity;judgment;safety precaution awareness  -     Impairments Affecting Function (Mobility) balance;endurance/activity tolerance;pain;range of motion (ROM);strength  -       Row Name 06/07/23 1123          Motor Skills    Motor Skills --  therapeutic exercises  -     Coordination WFL  -     Therapeutic Exercise hip;knee;ankle  -     Additional Documentation --  Pt declined transfers this session due to c/o diarrhea.  -       Row Name 06/07/23 1123          Hip (Therapeutic Exercise)    Hip (Therapeutic Exercise) AAROM (active assistive range of motion)  -     Hip AAROM (Therapeutic Exercise) bilateral;flexion;extension;aBduction;aDduction;supine;10 repetitions;2 sets  -       Row Name 06/07/23 1123          Knee (Therapeutic Exercise)    Knee (Therapeutic Exercise) AAROM (active assistive range of motion)  -     Knee AAROM (Therapeutic Exercise) bilateral;flexion;extension;supine;10 repetitions;2 sets  -       Row Name 06/07/23 1123          Ankle (Therapeutic Exercise)    Ankle (Therapeutic Exercise) AROM (active range of motion)  -     Ankle AROM (Therapeutic Exercise) bilateral;dorsiflexion;plantarflexion;supine;20 repititions  -       Row Name             Wound 12/02/21 Right anterior foot Incision    Wound - Properties  Group Placement Date: 12/02/21  -ES Side: Right  -ES Orientation: anterior  -ES Location: foot  -ES Primary Wound Type: Incision  -ES    Retired Wound - Properties Group Placement Date: 12/02/21  -ES Side: Right  -ES Orientation: anterior  -ES Location: foot  -ES Primary Wound Type: Incision  -ES    Retired Wound - Properties Group Date first assessed: 12/02/21  -ES Side: Right  -ES Location: foot  -ES Primary Wound Type: Incision  -ES      Row Name             Wound 06/22/21 1133 Left lateral heel    Wound - Properties Group Placement Date: 06/22/21  -FABIOLA Placement Time: 1133  -FABIOLA Present on Hospital Admission: Y  -FABIOLA Side: Left  -FABIOLA Orientation: lateral  -FABIOLA Location: heel  -FABIOLA    Retired Wound - Properties Group Placement Date: 06/22/21  -FABIOLA Placement Time: 1133  -FABIOLA Present on Hospital Admission: Y  -FABIOLA Side: Left  -FABIOLA Orientation: lateral  -FABIOLA Location: heel  -FABIOLA    Retired Wound - Properties Group Date first assessed: 06/22/21  -FABIOLA Time first assessed: 1133  -FABIOLA Present on Hospital Admission: Y  -FABIOLA Side: Left  -FABIOLA Location: heel  -FABIOLA      Row Name             Wound 05/19/23 1317 gluteal Blisters    Wound - Properties Group Placement Date: 05/19/23  -RASHARD Placement Time: 1317  -RASHARD Present on Hospital Admission: N  -RASHARD Location: gluteal  -RASHARD Primary Wound Type: Blisters  -RASHARD    Retired Wound - Properties Group Placement Date: 05/19/23  -RASHARD Placement Time: 1317  -RASHARD Present on Hospital Admission: N  -RASHARD Location: gluteal  -RASHARD Primary Wound Type: Blisters  -RASHARD    Retired Wound - Properties Group Date first assessed: 05/19/23  -RASHARD Time first assessed: 1317  -RASHARD Present on Hospital Admission: N  -RASHARD Location: gluteal  -RASHARD Primary Wound Type: Blisters  -RASHARD      Row Name 06/07/23 1123          Plan of Care Review    Plan of Care Reviewed With patient  -DK     Progress no change  -DK       Row Name 06/07/23 1123          Positioning and Restraints    Pre-Treatment Position in bed  -DK     Post Treatment Position bed  -DK      In Bed supine;call light within reach;encouraged to call for assist;exit alarm on;legs elevated;heels elevated  side rails up x 4  -DK       Row Name 06/07/23 1123          Therapy Assessment/Plan (PT)    Rehab Potential (PT) fair, will monitor progress closely  -DK     Criteria for Skilled Interventions Met (PT) skilled treatment is necessary  -DK     Therapy Frequency (PT) daily  -DK     Problem List (PT) problems related to;balance;mobility;range of motion (ROM);strength;pain  -DK     Activity Limitations Related to Problem List (PT) unable to ambulate safely;unable to transfer safely  -DK       Row Name 06/07/23 1123          Progress Summary (PT)    Progress Toward Functional Goals (PT) progress toward functional goals is fair  -DK               User Key  (r) = Recorded By, (t) = Taken By, (c) = Cosigned By      Initials Name Provider Type    Karon Jordan RN Registered Nurse    Mckenna Landaverde PTA Physical Therapist Assistant    Marlen Vaughn RN Registered Nurse    Katlyn Garcia RN Registered Nurse                      PT Recommendation and Plan  Planned Therapy Interventions (PT): balance training, bed mobility training, gait training, home exercise program, strengthening, transfer training  Therapy Frequency (PT): daily  Progress Summary (PT)  Progress Toward Functional Goals (PT): progress toward functional goals is fair  Plan of Care Reviewed With: patient  Progress: no change   Outcome Measures       Row Name 06/07/23 1122 06/05/23 1500          How much help from another person do you currently need...    Turning from your back to your side while in flat bed without using bedrails? 3  -DK 3  -MOE (r) JH (t) MOE (c)     Moving from lying on back to sitting on the side of a flat bed without bedrails? 3  -DK 2  -MOE (r) JH (t) MOE (c)     Moving to and from a bed to a chair (including a wheelchair)? 2  -DK 2  -MEO (r) JH (t) MOE (c)     Standing up from a chair using your arms (e.g.,  wheelchair, bedside chair)? 2  -DK 2  -MOE (r) JH (t) MOE (c)     Climbing 3-5 steps with a railing? 1  -DK 1  -MOE (r) JH (t) MOE (c)     To walk in hospital room? 1  -DK 1  -MOE (r) JH (t) MOE (c)     AM-PAC 6 Clicks Score (PT) 12  -DK 11  -MOE (r) JH (t)        Functional Assessment    Outcome Measure Options AM-PAC 6 Clicks Basic Mobility (PT)  -DK AM-PAC 6 Clicks Basic Mobility (PT)  -MOE (r) JH (t) MOE (c)               User Key  (r) = Recorded By, (t) = Taken By, (c) = Cosigned By      Initials Name Provider Type    Mckenna Landaverde PTA Physical Therapist Assistant    Merlin De La O, PT Physical Therapist     Aguilar Scott, PT Physical Therapist                     Time Calculation:    PT Charges       Row Name 06/07/23 1128             Time Calculation    PT Received On 06/07/23  -DK      PT Goal Re-Cert Due Date 06/14/23  -DK         Timed Charges    13455 - PT Therapeutic Exercise Minutes 14  -DK      55971 - PT Therapeutic Activity Minutes 4  -DK         Total Minutes    Timed Charges Total Minutes 18  -DK       Total Minutes 18  -DK                User Key  (r) = Recorded By, (t) = Taken By, (c) = Cosigned By      Initials Name Provider Type    Mckenna Landaverde PTA Physical Therapist Assistant                  Therapy Charges for Today       Code Description Service Date Service Provider Modifiers Qty    06087125013 HC PT THER PROC EA 15 MIN 6/7/2023 Mckenna Wong PTA GP 1            PT G-Codes  Outcome Measure Options: AM-PAC 6 Clicks Basic Mobility (PT)  AM-PAC 6 Clicks Score (PT): 12  AM-PAC 6 Clicks Score (OT): 17    Mckenna Wong PTA  6/7/2023

## 2023-06-07 NOTE — THERAPY PROGRESS REPORT/RE-CERT
Patient Name: Jose Shaikh  : 1960    MRN: [id number]                              Today's Date: 2023       Admit Date: 2023    Visit Dx:     ICD-10-CM ICD-9-CM   1. Acute febrile illness  R50.9 780.60   2. Sepsis without acute organ dysfunction, due to unspecified organism  A41.9 038.9     995.91   3. Pneumonia of right upper lobe due to infectious organism  J18.9 486   4. Uncontrolled type 2 diabetes mellitus with hyperglycemia  E11.65 250.02   5. Atrial fibrillation with rapid ventricular response  I48.91 427.31   6. Acute osteomyelitis of right foot  M86.171 730.07   7. Decreased activities of daily living (ADL)  Z78.9 V49.89   8. Difficulty in walking  R26.2 719.7     Patient Active Problem List   Diagnosis    Diabetic ulcer of left heel associated with type 2 DM    Acute osteomyelitis of left calcaneus     Diabetic ulcer of left heel associated with type 2 DM    Diabetic ulcer of right midfoot associated with type 2 DM    Paroxysmal atrial fibrillation    Essential hypertension    Hyperlipidemia LDL goal <100    Cellulitis and abscess of foot    High alkaline phosphatase level    Osteomyelitis    Onychomycosis    Onychocryptosis    Foot pain, bilateral    Osteomyelitis of foot, right, acute    Cellulitis of right foot    Type 2 diabetes mellitus, with long-term current use of insulin    Class 3 severe obesity due to excess calories with serious comorbidity and body mass index (BMI) of 45.0 to 49.9 in adult    Anxiety disorder, unspecified    Claustrophobia    Dependence on wheelchair    Depression, unspecified    Long term (current) use of anticoagulants    Long term (current) use of oral hypoglycemic drugs    Wound of foot    Non-prs chronic ulcer oth prt r foot limited to brkdwn skin    Orthostatic hypotension    Other chronic osteomyelitis, right ankle and foot    Personal history of nicotine dependence    Thrombocytopenia, unspecified    Unspecified open wound, right foot, initial  encounter    Diabetic foot infection    Subacute osteomyelitis of right foot    Right foot pain    Sepsis    Onychomycosis    Foot pain, left     Past Medical History:   Diagnosis Date    Absence of toe of right foot     Acute osteomyelitis of left calcaneus  8/18/2021    Anxiety and depression     Arthritis     Claustrophobia     Corns and callus     Diabetic ulcer of left heel associated with type 2 DM 8/18/2021    Diabetic ulcer of left heel associated with type 2 DM 7/6/2021    Diabetic ulcer of right midfoot associated with type 2 DM 8/18/2021    Difficulty walking     Essential hypertension 8/31/2021    Hammertoe     Hyperlipidemia LDL goal <100 8/31/2021    Ingrown toenail     Obesity     Paroxysmal atrial fibrillation 8/31/2021    Polyneuropathy     Pressure ulcer, stage 1     Tinea unguium     Type 2 diabetes mellitus with polyneuropathy      Past Surgical History:   Procedure Laterality Date    CYST REMOVAL      center of back; benign    INCISION AND DRAINAGE ABSCESS      back    INCISION AND DRAINAGE LEG Right 12/10/2021    Procedure: INCISION AND DRAINAGE LOWER EXTREMITY;  Surgeon: Ash Leyva DPM;  Location: [facility] MAIN OR;  Service: Podiatry;  Laterality: Right;    OTHER SURGICAL HISTORY      Surgical clips left foot    TOE SURGERY Right     Removal of 5th toe    TRANS METATARSAL AMPUTATION Right 12/2/2021    Procedure: AMPUTATION TRANS METATARSAL;  Surgeon: Ash Leyva DPM;  Location: [facility] MAIN OR;  Service: Podiatry;  Laterality: Right;    WRIST SURGERY Left     repair of injury      General Information       Row Name 06/07/23 1010          OT Time and Intention    Document Type progress note/recertification  -AV     Mode of Treatment individual therapy;occupational therapy  -AV       Row Name 06/07/23 1010          General Information    Patient Profile Reviewed yes  -AV     Prior Level of Function independent:;ADL's;transfer  Independent at baseline.  -AV     Existing  Precautions/Restrictions fall  -AV     Barriers to Rehab none identified  -AV       Row Name 06/07/23 1010          Occupational Profile    Reason for Services/Referral (Occupational Profile) OT re-certification as patient remains hospitalized and presents with limitations impacting ADL and transfer independence.  -AV       Row Name 06/07/23 1010          Living Environment    People in Home alone  -AV       Row Name 06/07/23 1010          Home Main Entrance    Number of Stairs, Main Entrance two  -AV       Row Name 06/07/23 1010          Cognition    Orientation Status (Cognition) --  alert and able to follow commands. performed upper extremity therapeutic exercises with minimal cues/ demonstration.  -AV       Row Name 06/07/23 1010          Safety Issues, Functional Mobility    Impairments Affecting Function (Mobility) balance;endurance/activity tolerance;strength  -AV               User Key  (r) = Recorded By, (t) = Taken By, (c) = Cosigned By      Initials Name Provider Type    Uvaldo Hardy OT Occupational Therapist                     Mobility/ADL's       Row Name 06/07/23 1012          Transfers    Comment, (Transfers) assist of 2  -AV       Row Name 06/07/23 1012          Activities of Daily Living    BADL Assessment/Intervention --  Independent feeding and grooming with setup in bed. Mod assist bathing. Max assist dressing and toilet hygiene (urinal, bedpan).  -AV       Row Name 06/07/23 1012          Mobility    Left Lower Extremity (Weight-bearing Status) non weight-bearing (NWB)  -AV     Right Lower Extremity (Weight-bearing Status) non weight-bearing (NWB)  -AV               User Key  (r) = Recorded By, (t) = Taken By, (c) = Cosigned By      Initials Name Provider Type    Uvaldo Hardy OT Occupational Therapist                   Obj/Interventions       Row Name 06/07/23 1014          Sensory Assessment (Somatosensory)    Sensory Assessment (Somatosensory) UE sensation intact  -AV       Row  Name 06/07/23 1014          Vision Assessment/Intervention    Visual Impairment/Limitations WFL;corrective lenses for reading  -AV       Row Name 06/07/23 1014          Range of Motion Comprehensive    General Range of Motion bilateral upper extremity ROM WFL  -AV     Comment, General Range of Motion AROM  -AV       Row Name 06/07/23 1014          Strength Comprehensive (MMT)    Comment, General Manual Muscle Testing (MMT) Assessment 4/5 bilateral biceps, triceps and   -AV       Row Name 06/07/23 1014          Shoulder (Therapeutic Exercise)    Shoulder Strengthening (Therapeutic Exercise) bilateral;flexion;horizontal aBduction/aDduction;1 lb free weight  x25  -AV       Row Name 06/07/23 1014          Elbow/Forearm (Therapeutic Exercise)    Elbow/Forearm Strengthening (Therapeutic Exercise) bilateral;flexion;extension;supination;pronation;1 lb free weight  x25  -AV       Row Name 06/07/23 1014          Motor Skills    Motor Skills coordination;functional endurance  -AV     Coordination WFL  right dominant  -AV     Functional Endurance fair minus  -AV       Row Name 06/07/23 1014          Balance    Comment, Balance impaired  -AV               User Key  (r) = Recorded By, (t) = Taken By, (c) = Cosigned By      Initials Name Provider Type    Uvaldo Hardy OT Occupational Therapist                   Goals/Plan       Row Name 06/07/23 1017          Transfer Goal 1 (OT)    Activity/Assistive Device (Transfer Goal 1, OT) transfers, all  -AV     [place] Level/Cues Needed (Transfer Goal 1, OT) modified independence  -AV     Time Frame (Transfer Goal 1, OT) long term goal (LTG);10 days  -AV     Progress/Outcome (Transfer Goal 1, OT) goal ongoing  -AV       Row Name 06/07/23 1017          Bathing Goal 1 (OT)    Activity/Device (Bathing Goal 1, OT) bathing skills, all  -AV     [place] Level/Cues Needed (Bathing Goal 1, OT) modified independence  -AV     Time Frame (Bathing Goal 1, OT) long term goal  (LTG);10 days  -AV     Progress/Outcomes (Bathing Goal 1, OT) goal ongoing  -AV       Row Name 06/07/23 1017          Dressing Goal 1 (OT)    Activity/Device (Dressing Goal 1, OT) dressing skills, all  -AV     [place]/Cues Needed (Dressing Goal 1, OT) modified independence  -AV     Time Frame (Dressing Goal 1, OT) long term goal (LTG);10 days  -AV     Progress/Outcome (Dressing Goal 1, OT) goal ongoing  -AV       Row Name 06/07/23 1017          Toileting Goal 1 (OT)    Activity/Device (Toileting Goal 1, OT) toileting skills, all  -AV     [place] Level/Cues Needed (Toileting Goal 1, OT) modified independence  -AV     Time Frame (Toileting Goal 1, OT) long term goal (LTG);10 days  -AV     Progress/Outcome (Toileting Goal 1, OT) goal ongoing  -AV       Row Name 06/07/23 1017          Grooming Goal 1 (OT)    Activity/Device (Grooming Goal 1, OT) grooming skills, all  -AV     [place] (Grooming Goal 1, OT) modified independence  -AV     Time Frame (Grooming Goal 1, OT) long term goal (LTG);10 days  -AV     Progress/Outcome (Grooming Goal 1, OT) goal ongoing  -AV       Row Name 06/07/23 1017          Strength Goal 1 (OT)    Strength Goal 1 (OT) Patient will demonstrate 4+/5 bilateral upper extremities to increase ADL/ transfer independence.  -AV     Time Frame (Strength Goal 1, OT) long term goal (LTG);10 days  -AV     Progress/Outcome (Strength Goal 1, OT) good progress toward goal;goal revised this date  -AV       Row Name 06/07/23 1017          Problem Specific Goal 1 (OT)    Problem Specific Goal 1 (OT) Patient will improve activity tolerance to fair plus to support independence with ADL activities  -AV     Time Frame (Problem Specific Goal 1, OT) long term goal (LTG);10 days  -AV     Progress/Outcome (Problem Specific Goal 1, OT) goal ongoing  -AV       Row Name 06/07/23 1017          Therapy Assessment/Plan (OT)    Planned Therapy Interventions (OT) activity tolerance training;BADL  retraining;functional balance retraining;occupation/activity based interventions;patient/caregiver education/training;transfer/mobility retraining;strengthening exercise  -AV               User Key  (r) = Recorded By, (t) = Taken By, (c) = Cosigned By      Initials Name Provider Type    Uvaldo Hardy OT Occupational Therapist                   Clinical Impression       Row Name 06/07/23 1015          Pain Scale: FACES Pre/Post-Treatment    Pain: FACES Scale, Pretreatment 0-->no hurt  -AV     Posttreatment Pain Rating 0-->no hurt  -AV       Row Name 06/07/23 1015          Plan of Care Review    Plan of Care Reviewed With patient  -AV     Progress improving  slight progress with ADL independence  -AV     Outcome Evaluation Patient continues to present with limitations of balance, strength and endurance/ activity tolerance which are impacting ADL and transfer independence. Skilled OT is indicated to remediate/compensate for deficits to maximize independence and safety with functional tasks.  -AV       Row Name 06/07/23 1015          Therapy Assessment/Plan (OT)    Patient/Family Therapy Goal Statement (OT) regain independence  -AV     Rehab Potential (OT) good, to achieve stated therapy goals  -AV     Criteria for Skilled Therapeutic Interventions Met (OT) yes;meets criteria;skilled treatment is necessary  -AV     Therapy Frequency (OT) 5 times/wk  -AV       Row Name 06/07/23 1015          Therapy Plan Review/Discharge Plan (OT)    Anticipated Discharge Disposition (OT) sub acute care setting;inpatient rehabilitation facility  -AV       Row Name 06/07/23 1015          Vital Signs    O2 Delivery Pre Treatment room air  -AV     O2 Delivery Intra Treatment room air  -AV     O2 Delivery Post Treatment room air  -AV               User Key  (r) = Recorded By, (t) = Taken By, (c) = Cosigned By      Initials Name Provider Type    Uvaldo Hardy OT Occupational Therapist                   Outcome Measures       Row  Name 06/07/23 1018          How much help from another is currently needed...    Putting on and taking off regular lower body clothing? 2  -AV     Bathing (including washing, rinsing, and drying) 2  -AV     Toileting (which includes using toilet bed pan or urinal) 2  -AV     Putting on and taking off regular upper body clothing 3  -AV     Taking care of personal grooming (such as brushing teeth) 4  -AV     Eating meals 4  -AV     AM-PAC 6 Clicks Score (OT) 17  -AV       Row Name 06/06/23 2300          How much help from another person do you currently need...    Turning from your back to your side while in flat bed without using bedrails? 3  -KL     Moving from lying on back to sitting on the side of a flat bed without bedrails? 3  -KL     Moving to and from a bed to a chair (including a wheelchair)? 2  -KL     Standing up from a chair using your arms (e.g., wheelchair, bedside chair)? 2  -KL     Climbing 3-5 steps with a railing? 1  -KL     To walk in hospital room? 1  -KL     AM-PAC 6 Clicks Score (PT) 12  -KL     Highest level of mobility 4 --> Transferred to chair/commode  -KL       Row Name 06/07/23 1018          Functional Assessment    Outcome Measure Options AM-PAC 6 Clicks Daily Activity (OT);Optimal Instrument  -AV       Row Name 06/07/23 1018          Optimal Instrument    Bending/Stooping 4  -AV     Standing 4  -AV     Reaching 1  -AV               User Key  (r) = Recorded By, (t) = Taken By, (c) = Cosigned By      Initials Name Provider Type    AV Uvaldo Christine OT Occupational Therapist    Shannan Cedillo, RN Registered Nurse                    Occupational Therapy Education       Title: PT OT SLP Therapies (Done)       Topic: Occupational Therapy (Done)       Point: ADL training (Done)       Description:   Instruct learner(s) on proper safety adaptation and remediation techniques during self care or transfers.   Instruct in proper use of assistive devices.                  Learning Progress  Summary             Patient Acceptance, E, VU,DU by RF at 5/26/2023 0807    Acceptance, E,D, DU by PG at 5/15/2023 1303                         Point: Home exercise program (Done)       Description:   Instruct learner(s) on appropriate technique for monitoring, assisting and/or progressing therapeutic exercises/activities.                  Learning Progress Summary             Patient Acceptance, E, VU,DU by RF at 5/26/2023 0807    Acceptance, E,D, DU by PG at 5/15/2023 1303                         Point: Precautions (Done)       Description:   Instruct learner(s) on prescribed precautions during self-care and functional transfers.                  Learning Progress Summary             Patient Acceptance, E, VU,DU by RF at 5/26/2023 0807    Acceptance, E,D, DU by PG at 5/15/2023 1303                         Point: Body mechanics (Done)       Description:   Instruct learner(s) on proper positioning and spine alignment during self-care, functional mobility activities and/or exercises.                  Learning Progress Summary             Patient Acceptance, E, VU,DU by  at 5/26/2023 0807    Acceptance, E,D, DU by PG at 5/15/2023 1303                                         User Key       Initials Effective Dates Name Provider Type Discipline     06/16/21 -  Kodak Matos OT Occupational Therapist OT     01/19/22 -  Karl Baker RN Registered Nurse Nurse                  OT Recommendation and Plan  Planned Therapy Interventions (OT): activity tolerance training, BADL retraining, functional balance retraining, occupation/activity based interventions, patient/caregiver education/training, transfer/mobility retraining, strengthening exercise  Therapy Frequency (OT): 5 times/wk  Plan of Care Review  Plan of Care Reviewed With: patient  Progress: improving (slight progress with ADL independence)  Outcome Evaluation: Patient continues to present with limitations of balance, strength and endurance/ activity  tolerance which are impacting ADL and transfer independence. Skilled OT is indicated to remediate/compensate for deficits to maximize independence and safety with functional tasks.     Time Calculation:    Time Calculation- OT       Row Name 06/07/23 1021             Time Calculation- OT    OT Received On 06/07/23  -AV      OT Goal Re-Cert Due Date 06/16/23  -AV         Timed Charges    19875 - OT Therapeutic Exercise Minutes 25  -AV         Total Minutes    Timed Charges Total Minutes 25  -AV       Total Minutes 25  -AV                User Key  (r) = Recorded By, (t) = Taken By, (c) = Cosigned By      Initials Name Provider Type    Uvaldo Hardy OT Occupational Therapist                  Therapy Charges for Today       Code Description Service Date Service Provider Modifiers Qty    [id number] HC OT THER PROC EA 15 MIN 6/7/2023 Uvaldo Christine OT GO 2                 Uvaldo Christine OT  6/7/2023

## 2023-06-08 LAB
ANION GAP SERPL CALCULATED.3IONS-SCNC: 8.1 MMOL/L (ref 5–15)
BASOPHILS # BLD AUTO: 0.03 10*3/MM3 (ref 0–0.2)
BASOPHILS NFR BLD AUTO: 0.7 % (ref 0–1.5)
BUN SERPL-MCNC: 9 MG/DL (ref 8–23)
BUN/CREAT SERPL: 17.3 (ref 7–25)
CALCIUM SPEC-SCNC: 8.4 MG/DL (ref 8.6–10.5)
CHLORIDE SERPL-SCNC: 102 MMOL/L (ref 98–107)
CO2 SERPL-SCNC: 25.9 MMOL/L (ref 22–29)
CREAT SERPL-MCNC: 0.52 MG/DL (ref 0.76–1.27)
DEPRECATED RDW RBC AUTO: 47.5 FL (ref 37–54)
EGFRCR SERPLBLD CKD-EPI 2021: 112.6 ML/MIN/1.73
EOSINOPHIL # BLD AUTO: 0.14 10*3/MM3 (ref 0–0.4)
EOSINOPHIL NFR BLD AUTO: 3.5 % (ref 0.3–6.2)
ERYTHROCYTE [DISTWIDTH] IN BLOOD BY AUTOMATED COUNT: 16.4 % (ref 12.3–15.4)
GLUCOSE BLDC GLUCOMTR-MCNC: 173 MG/DL (ref 70–99)
GLUCOSE BLDC GLUCOMTR-MCNC: 227 MG/DL (ref 70–99)
GLUCOSE BLDC GLUCOMTR-MCNC: 239 MG/DL (ref 70–99)
GLUCOSE BLDC GLUCOMTR-MCNC: 341 MG/DL (ref 70–99)
GLUCOSE BLDC GLUCOMTR-MCNC: 370 MG/DL (ref 70–99)
GLUCOSE SERPL-MCNC: 215 MG/DL (ref 65–99)
HCT VFR BLD AUTO: 36 % (ref 37.5–51)
HGB BLD-MCNC: 10.7 G/DL (ref 13–17.7)
IMM GRANULOCYTES # BLD AUTO: 0.01 10*3/MM3 (ref 0–0.05)
IMM GRANULOCYTES NFR BLD AUTO: 0.2 % (ref 0–0.5)
LYMPHOCYTES # BLD AUTO: 1.2 10*3/MM3 (ref 0.7–3.1)
LYMPHOCYTES NFR BLD AUTO: 29.9 % (ref 19.6–45.3)
MAGNESIUM SERPL-MCNC: 1.7 MG/DL (ref 1.6–2.4)
MCH RBC QN AUTO: 23.8 PG (ref 26.6–33)
MCHC RBC AUTO-ENTMCNC: 29.7 G/DL (ref 31.5–35.7)
MCV RBC AUTO: 80 FL (ref 79–97)
MONOCYTES # BLD AUTO: 0.56 10*3/MM3 (ref 0.1–0.9)
MONOCYTES NFR BLD AUTO: 14 % (ref 5–12)
NEUTROPHILS NFR BLD AUTO: 2.07 10*3/MM3 (ref 1.7–7)
NEUTROPHILS NFR BLD AUTO: 51.7 % (ref 42.7–76)
NRBC BLD AUTO-RTO: 0 /100 WBC (ref 0–0.2)
PLATELET # BLD AUTO: 152 10*3/MM3 (ref 140–450)
PMV BLD AUTO: 11 FL (ref 6–12)
POTASSIUM SERPL-SCNC: 4.2 MMOL/L (ref 3.5–5.2)
RBC # BLD AUTO: 4.5 10*6/MM3 (ref 4.14–5.8)
SODIUM SERPL-SCNC: 136 MMOL/L (ref 136–145)
WBC NRBC COR # BLD: 4.01 10*3/MM3 (ref 3.4–10.8)

## 2023-06-08 PROCEDURE — 94799 UNLISTED PULMONARY SVC/PX: CPT

## 2023-06-08 PROCEDURE — 63710000001 INSULIN LISPRO (HUMAN) PER 5 UNITS: Performed by: INTERNAL MEDICINE

## 2023-06-08 PROCEDURE — 85025 COMPLETE CBC W/AUTO DIFF WBC: CPT | Performed by: INTERNAL MEDICINE

## 2023-06-08 PROCEDURE — 25010000002 MAGNESIUM SULFATE 2 GM/50ML SOLUTION: Performed by: INTERNAL MEDICINE

## 2023-06-08 PROCEDURE — 82948 REAGENT STRIP/BLOOD GLUCOSE: CPT

## 2023-06-08 PROCEDURE — 63710000001 INSULIN LISPRO (HUMAN) PER 5 UNITS

## 2023-06-08 PROCEDURE — 80048 BASIC METABOLIC PNL TOTAL CA: CPT | Performed by: INTERNAL MEDICINE

## 2023-06-08 PROCEDURE — 99232 SBSQ HOSP IP/OBS MODERATE 35: CPT | Performed by: INTERNAL MEDICINE

## 2023-06-08 PROCEDURE — 63710000001 INSULIN DETEMIR PER 5 UNITS: Performed by: INTERNAL MEDICINE

## 2023-06-08 PROCEDURE — 83735 ASSAY OF MAGNESIUM: CPT | Performed by: INTERNAL MEDICINE

## 2023-06-08 PROCEDURE — 97110 THERAPEUTIC EXERCISES: CPT

## 2023-06-08 RX ORDER — MAGNESIUM SULFATE HEPTAHYDRATE 40 MG/ML
2 INJECTION, SOLUTION INTRAVENOUS ONCE
Status: COMPLETED | OUTPATIENT
Start: 2023-06-08 | End: 2023-06-08

## 2023-06-08 RX ORDER — INSULIN LISPRO 100 [IU]/ML
10 INJECTION, SOLUTION INTRAVENOUS; SUBCUTANEOUS
Status: DISCONTINUED | OUTPATIENT
Start: 2023-06-08 | End: 2023-06-10

## 2023-06-08 RX ADMIN — OXYCODONE HYDROCHLORIDE 10 MG: 5 TABLET ORAL at 00:09

## 2023-06-08 RX ADMIN — AMOXICILLIN 1000 MG: 500 CAPSULE ORAL at 13:27

## 2023-06-08 RX ADMIN — INSULIN LISPRO 8 UNITS: 100 INJECTION, SOLUTION INTRAVENOUS; SUBCUTANEOUS at 09:08

## 2023-06-08 RX ADMIN — DICLOFENAC 2 G: 10 GEL TOPICAL at 21:14

## 2023-06-08 RX ADMIN — PREGABALIN 25 MG: 25 CAPSULE ORAL at 06:46

## 2023-06-08 RX ADMIN — OXYCODONE HYDROCHLORIDE 10 MG: 5 TABLET ORAL at 09:09

## 2023-06-08 RX ADMIN — PREGABALIN 25 MG: 25 CAPSULE ORAL at 21:14

## 2023-06-08 RX ADMIN — SIMETHICONE 80 MG: 80 TABLET, CHEWABLE ORAL at 17:15

## 2023-06-08 RX ADMIN — SIMETHICONE 80 MG: 80 TABLET, CHEWABLE ORAL at 21:13

## 2023-06-08 RX ADMIN — ATORVASTATIN CALCIUM 10 MG: 10 TABLET, FILM COATED ORAL at 21:14

## 2023-06-08 RX ADMIN — Medication 10 ML: at 21:14

## 2023-06-08 RX ADMIN — CYCLOBENZAPRINE 10 MG: 10 TABLET, FILM COATED ORAL at 09:08

## 2023-06-08 RX ADMIN — FAMOTIDINE 40 MG: 20 TABLET ORAL at 09:09

## 2023-06-08 RX ADMIN — INSULIN LISPRO 2 UNITS: 100 INJECTION, SOLUTION INTRAVENOUS; SUBCUTANEOUS at 09:07

## 2023-06-08 RX ADMIN — DILTIAZEM HYDROCHLORIDE 120 MG: 120 CAPSULE, COATED, EXTENDED RELEASE ORAL at 09:08

## 2023-06-08 RX ADMIN — MIDODRINE HYDROCHLORIDE 2.5 MG: 2.5 TABLET ORAL at 17:15

## 2023-06-08 RX ADMIN — DIGOXIN 125 MCG: 125 TABLET ORAL at 13:28

## 2023-06-08 RX ADMIN — SIMETHICONE 80 MG: 80 TABLET, CHEWABLE ORAL at 13:28

## 2023-06-08 RX ADMIN — APIXABAN 5 MG: 5 TABLET, FILM COATED ORAL at 21:14

## 2023-06-08 RX ADMIN — APIXABAN 5 MG: 5 TABLET, FILM COATED ORAL at 09:09

## 2023-06-08 RX ADMIN — INSULIN DETEMIR 52 UNITS: 100 INJECTION, SOLUTION SUBCUTANEOUS at 21:13

## 2023-06-08 RX ADMIN — METOPROLOL SUCCINATE 37.5 MG: 25 TABLET, EXTENDED RELEASE ORAL at 21:13

## 2023-06-08 RX ADMIN — INSULIN LISPRO 10 UNITS: 100 INJECTION, SOLUTION INTRAVENOUS; SUBCUTANEOUS at 17:16

## 2023-06-08 RX ADMIN — PREGABALIN 25 MG: 25 CAPSULE ORAL at 13:28

## 2023-06-08 RX ADMIN — INSULIN LISPRO 3 UNITS: 100 INJECTION, SOLUTION INTRAVENOUS; SUBCUTANEOUS at 21:13

## 2023-06-08 RX ADMIN — MIDODRINE HYDROCHLORIDE 2.5 MG: 2.5 TABLET ORAL at 13:28

## 2023-06-08 RX ADMIN — CYCLOBENZAPRINE 10 MG: 10 TABLET, FILM COATED ORAL at 17:16

## 2023-06-08 RX ADMIN — INSULIN LISPRO 3 UNITS: 100 INJECTION, SOLUTION INTRAVENOUS; SUBCUTANEOUS at 17:15

## 2023-06-08 RX ADMIN — MIDODRINE HYDROCHLORIDE 2.5 MG: 2.5 TABLET ORAL at 06:46

## 2023-06-08 RX ADMIN — INSULIN LISPRO 8 UNITS: 100 INJECTION, SOLUTION INTRAVENOUS; SUBCUTANEOUS at 13:28

## 2023-06-08 RX ADMIN — SIMETHICONE 80 MG: 80 TABLET, CHEWABLE ORAL at 06:47

## 2023-06-08 RX ADMIN — INSULIN LISPRO 5 UNITS: 100 INJECTION, SOLUTION INTRAVENOUS; SUBCUTANEOUS at 13:29

## 2023-06-08 RX ADMIN — AMOXICILLIN 1000 MG: 500 CAPSULE ORAL at 21:13

## 2023-06-08 RX ADMIN — METOPROLOL SUCCINATE 37.5 MG: 25 TABLET, EXTENDED RELEASE ORAL at 09:09

## 2023-06-08 RX ADMIN — AMOXICILLIN 1000 MG: 500 CAPSULE ORAL at 06:47

## 2023-06-08 RX ADMIN — Medication 250 MG: at 21:14

## 2023-06-08 RX ADMIN — OXYCODONE HYDROCHLORIDE 10 MG: 5 TABLET ORAL at 23:49

## 2023-06-08 RX ADMIN — INSULIN DETEMIR 48 UNITS: 100 INJECTION, SOLUTION SUBCUTANEOUS at 09:08

## 2023-06-08 RX ADMIN — Medication 250 MG: at 09:09

## 2023-06-08 RX ADMIN — OXYCODONE HYDROCHLORIDE 10 MG: 5 TABLET ORAL at 17:16

## 2023-06-08 RX ADMIN — MAGNESIUM SULFATE HEPTAHYDRATE 2 G: 2 INJECTION, SOLUTION INTRAVENOUS at 13:43

## 2023-06-08 NOTE — PLAN OF CARE
Goal Outcome Evaluation:      Patient alert and oriented x4. Medicated with prn pain medication per mar instructions for pain. Blood glucose monitored as ordered, insulin administered per MAR instructions. No other complaints per the patient this shift.

## 2023-06-08 NOTE — PROGRESS NOTES
[facility]   Hospitalist Progress Note    Date of admission: 5/12/2023  Patient Name: Jose Shaikh  1960  Date: 6/8/2023      Subjective     Chief Complaint   Patient presents with    Hip Pain    Shoulder Pain       Summary: 62 y.o. male with hx of osteomyelitis (recent discharge for similar), dm2 and recurrent/chronic foot infections, afib, morbid obesity who presented with worsening hip pain.         Patient was recently admitted to the hospital in early April for foot infection.  There was recommendation for amputation due to osteomyelitis in the foot and the patient refused.  He was unable to be placed at that time.  He is a sex offender making it very difficult for placement.  Pt treated with oral abx with doxy/cipro for Alcaligenes facialis and Morganella morganii last admission. He follows with wound care/Dr Daniels and had attempted to switch antibiotics to Levaquin per pharmacy recommendations based off culture data but was unable to get hold the patient so he was never switched.      Treating for right foot osteomyelitis and bacteremia with 6 week course with amoxicillin per ID.  Debilitated and working on rehab placement.  For hip pain - X-ray of pelvis and hip shows DJD of bilateral hips left worse than right.  Patient refused MRI of the left hip and order was canceled as patient is very claustrophobic.      Interval Followup: bm doing slightly better still with some gas. No cp/light headedness    Objective     Vitals:   Temp:  [97.6 °F (36.4 °C)-98.8 °F (37.1 °C)] 98.7 °F (37.1 °C)  Heart Rate:  [67-72] 69  Resp:  [16-17] 17  BP: (120-133)/(56-70) 131/59    Physical Exam  Awake, conversant, morbidly obese, appears older than stated age  B/l foot wounds dressed, no acute drainage  Abd obese, soft, no guarding, no acute ttp  Breathing comfortably on room air  Rrr  Aox3     Result Review:  Vital signs, labs and recent relevant imaging reviewed.      acetaminophen, 1,000 mg, Oral,  Q8H  amoxicillin, 1,000 mg, Oral, Q8H  apixaban, 5 mg, Oral, Q12H  atorvastatin, 10 mg, Oral, Nightly  Diclofenac Sodium, 2 g, Topical, 4x Daily  digoxin, 125 mcg, Oral, Daily  famotidine, 40 mg, Oral, Daily  insulin detemir, 52 Units, Subcutaneous, Q12H  insulin lispro, 10 Units, Subcutaneous, TID With Meals  insulin lispro, 2-7 Units, Subcutaneous, 4x Daily With Meals & Nightly  metoprolol succinate XL, 37.5 mg, Oral, Q12H  midodrine, 2.5 mg, Oral, TID AC  pregabalin, 25 mg, Oral, Q8H  saccharomyces boulardii, 250 mg, Oral, BID  simethicone, 80 mg, Oral, 4x Daily AC & at Bedtime  sodium chloride, 10 mL, Intravenous, Q12H          aluminum-magnesium hydroxide-simethicone    [DISCONTINUED] senna-docusate sodium **AND** polyethylene glycol **AND** bisacodyl **AND** bisacodyl    calcium carbonate    cyclobenzaprine    dextrose    dextrose    glucagon (human recombinant)    loperamide    melatonin    metoprolol tartrate    [] Morphine **AND** naloxone    ondansetron    oxyCODONE    phenylephrine-mineral oil-petrolatum    [COMPLETED] Insert peripheral IV **AND** sodium chloride    sodium chloride    sodium chloride      Assessment / Plan     Assessment/Plan:  Sepsis poa 2/2 pna, bacteremia  RUL healthcare associated pneumonia  Streptococcus agalactiae bacteremia  Chronic osteomyelitis of right foot involving cuboid and maybe cuneiform.  On p.o. amoxicillin for 6 weeks  Cellulitis of right foot due to Streptococcus agalactiae.  Paroxysmal A-fib with intermittent RVR on Eliquis.  Heart rates have been well controlled  Bilateral diabetic foot ulcers.  NIDDM.  Most recent A1c 6.6 in 2023  Morbid obesity BMI of 44.8, down from 48.1 on admission  S/p TMA of right foot.  Hyponatremia.  likely from hyperglycemia/volume overload.  Clinically significant.  Resolved.  Hypophosphatemia.  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  Chronic shoulder neck pain  Hypertension.  Blood pressure  stable.  Diarrhea most likely antibiotic associated  Prolonged hospitalization, admitted 5/12/23    continue scheduled simethicone, monitor bowels  voltaren for joint pain, continue additional prn/pain medications as above  Blood pressure starting to improve further, HR still adequately controlled  Dc diltiazem tomorrow  Cont digoxin and meto succinate   Dc midodrine and watch bp closely  Cont apixaban for pafib   Cont po amoxicillin for osteomyelitis until 6/23, florastor   Levemir, ssi, lyrica, monitor blood glucose, titrate further today   Cont famotidine  C diff negative, prn loperamide if needed, discontinued scheduled bowel reg  Continue hospital monitoring and treatment at current level of care - does not need upgraded at this time.  Discuss with kalen, rn     Rehab placement difficulties, cont tx and monitoring as above       DVT prophylaxis:  Medical DVT prophylaxis orders are present.    Level Of Support Discussed With: Patient  Code Status (Patient has no pulse and is not breathing): CPR (Attempt to Resuscitate)  Medical Interventions (Patient has pulse or is breathing): Full Support        CBC          6/1/2023    06:29 6/6/2023    04:52 6/8/2023    05:20   CBC   WBC 4.20  3.62  4.01    RBC 4.37  4.61  4.50    Hemoglobin 10.6  11.3  10.7    Hematocrit 35.0  36.7  36.0    MCV 80.1  79.6  80.0    MCH 24.3  24.5  23.8    MCHC 30.3  30.8  29.7    RDW 16.9  16.5  16.4    Platelets 153  152  152      CMP          6/1/2023    06:29 6/6/2023    04:52 6/8/2023    05:20   CMP   Glucose 203  217  215    BUN 9  11  9    Creatinine 0.53  0.59  0.52    EGFR 111.9  108.3  112.6    Sodium 136  C 138  136    Potassium 4.5  C 3.8  4.2    Chloride 100  C 101  102    Calcium 8.4  8.8  8.4    Total Protein  6.9     Albumin  2.7     Globulin  4.2     Total Bilirubin  0.6     Alkaline Phosphatase  321     AST (SGOT)  32     ALT (SGPT)  18     Albumin/Globulin Ratio  0.6     BUN/Creatinine Ratio 17.0  18.6  17.3    Anion Gap 6.2   C 7.3  8.1       Details         C Corrected result

## 2023-06-08 NOTE — PROGRESS NOTES
[facility]   Hospitalist Progress Note    Date of admission: 5/12/2023  Patient Name: Jose Shaikh  1960  Date: 6/7/2023      Subjective     Chief Complaint   Patient presents with    Hip Pain    Shoulder Pain       Summary: 62 y.o. male with hx of osteomyelitis (recent discharge for similar), dm2 and recurrent/chronic foot infections, afib, morbid obesity who presented with worsening hip pain.         Patient was recently admitted to the hospital in early April for foot infection.  There was recommendation for amputation due to osteomyelitis in the foot and the patient refused.  He was unable to be placed at that time.  He is a sex offender making it very difficult for placement.  Pt treated with oral abx with doxy/cipro for Alcaligenes facialis and Morganella morganii last admission. He follows with wound care/Dr Daniels and had attempted to switch antibiotics to Levaquin per pharmacy recommendations based off culture data but was unable to get hold the patient so he was never switched.      Treating for right foot osteomyelitis and bacteremia with 6 week course with amoxicillin per ID.  Debilitated and working on rehab placement.  For hip pain - X-ray of pelvis and hip shows DJD of bilateral hips left worse than right.  Patient refused MRI of the left hip and order was canceled as patient is very claustrophobic.      Interval Followup:  notes some gas/bloating. Some occasional diarrhea still.   No other acute complaints      Objective     Vitals:   Temp:  [97.9 °F (36.6 °C)-98.6 °F (37 °C)] 98.1 °F (36.7 °C)  Heart Rate:  [65-72] 69  Resp:  [16] 16  BP: (116-133)/(59-68) 133/68    Physical Exam  Awake, conversant, morbidly obese, appears older than stated age  B/l foot wounds dressed, no acute drainage  Abd obese, soft, no guarding, no acute ttp  Breathing comfortably on room air  Rrr  Aox3     Result Review:  Vital signs, labs and recent relevant imaging reviewed.      acetaminophen, 1,000 mg, Oral,  Q8H  amoxicillin, 1,000 mg, Oral, Q8H  apixaban, 5 mg, Oral, Q12H  atorvastatin, 10 mg, Oral, Nightly  Diclofenac Sodium, 2 g, Topical, 4x Daily  digoxin, 125 mcg, Oral, Daily  dilTIAZem CD, 120 mg, Oral, Q24H  famotidine, 40 mg, Oral, Daily  insulin detemir, 48 Units, Subcutaneous, Q12H  insulin lispro, 2-7 Units, Subcutaneous, 4x Daily With Meals & Nightly  insulin lispro, 8 Units, Subcutaneous, TID With Meals  metoprolol succinate XL, 37.5 mg, Oral, Q12H  midodrine, 2.5 mg, Oral, TID AC  pregabalin, 25 mg, Oral, Q8H  saccharomyces boulardii, 250 mg, Oral, BID  simethicone, 80 mg, Oral, 4x Daily AC & at Bedtime  sodium chloride, 10 mL, Intravenous, Q12H          aluminum-magnesium hydroxide-simethicone    [DISCONTINUED] senna-docusate sodium **AND** polyethylene glycol **AND** bisacodyl **AND** bisacodyl    calcium carbonate    cyclobenzaprine    dextrose    dextrose    glucagon (human recombinant)    loperamide    melatonin    metoprolol tartrate    [] Morphine **AND** naloxone    ondansetron    oxyCODONE    phenylephrine-mineral oil-petrolatum    [COMPLETED] Insert peripheral IV **AND** sodium chloride    sodium chloride    sodium chloride      Assessment / Plan     Assessment/Plan:  Sepsis poa 2/2 pna, bacteremia  RUL healthcare associated pneumonia  Streptococcus agalactiae bacteremia  Chronic osteomyelitis of right foot involving cuboid and maybe cuneiform.  On p.o. amoxicillin for 6 weeks  Cellulitis of right foot due to Streptococcus agalactiae.  Paroxysmal A-fib with intermittent RVR on Eliquis.  Heart rates have been well controlled  Bilateral diabetic foot ulcers.  NIDDM.  Most recent A1c 6.6 in 2023  Morbid obesity BMI of 44.8, down from 48.1 on admission  S/p TMA of right foot.  Hyponatremia.  likely from hyperglycemia/volume overload.  Clinically significant.  Resolved.  Hypophosphatemia.  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  Chronic shoulder neck  pain  Hypertension.  Blood pressure stable.  Diarrhea most likely antibiotic associated  Prolonged hospitalization, admitted 5/12/23    Add scheduled simethicone, monitor bowels  Add voltaren for joint pain, continue additional prn/pain medications as above  Still requiring midodrine for blood pressure support, bp slightly better, titrate down to 2.5mg tid   Cont diltiazem, digoxin, cont on slightly decreased metop succinate from 50 bid to 37.5 bid and see if pt able to fully wean off of midodrine, monitor HR   Cont apixaban for pafib   Cont po amoxicillin for osteomyelitis until 6/23, florastor   Levemir, ssi, lyrica, monitor blood glucose, titrate further today   Cont famotidine  C diff negative, prn loperamide if needed, discontinued scheduled bowel reg  Previous cxr reviewed, rul pna noted on my review  Check a.m. CBC, BMP, magnesium, phosphorus  Continue hospital monitoring and treatment at current level of care - does not need upgraded at this time.  Discuss with kalen, rn     Rehab placement difficulties, cont tx and monitoring as above       DVT prophylaxis:  Medical DVT prophylaxis orders are present.    Level Of Support Discussed With: Patient  Code Status (Patient has no pulse and is not breathing): CPR (Attempt to Resuscitate)  Medical Interventions (Patient has pulse or is breathing): Full Support        CBC          5/25/2023    05:06 6/1/2023    06:29 6/6/2023    04:52   CBC   WBC 6.34  4.20  3.62    RBC 4.41  4.37  4.61    Hemoglobin 11.0  10.6  11.3    Hematocrit 35.8  35.0  36.7    MCV 81.2  80.1  79.6    MCH 24.9  24.3  24.5    MCHC 30.7  30.3  30.8    RDW 16.3  16.9  16.5    Platelets 198  153  152      CMP          5/25/2023    05:06 6/1/2023    06:29 6/6/2023    04:52   CMP   Glucose 179  203  217    BUN 10  9  11    Creatinine 0.39  0.53  0.59    EGFR 122.8  111.9  108.3    Sodium 135  136  C 138    Potassium 4.0  4.5  C 3.8    Chloride 101  100  C 101    Calcium 8.4  8.4  8.8    Total Protein  6.6   6.9    Albumin 2.1   2.7    Globulin 4.5   4.2    Total Bilirubin 0.7   0.6    Alkaline Phosphatase 565   321    AST (SGOT) 52   32    ALT (SGPT) 35   18    Albumin/Globulin Ratio 0.5   0.6    BUN/Creatinine Ratio 25.6  17.0  18.6    Anion Gap 5.6  6.2  C 7.3       Details         C Corrected result

## 2023-06-08 NOTE — THERAPY TREATMENT NOTE
Acute Care - Physical Therapy Initial Evaluation   [facility]     Patient Name: Jose Shaikh  : 1960  MRN: [id number]  Today's Date: 2023      Visit Dx:     ICD-10-CM ICD-9-CM   1. Acute febrile illness  R50.9 780.60   2. Sepsis without acute organ dysfunction, due to unspecified organism  A41.9 038.9     995.91   3. Pneumonia of right upper lobe due to infectious organism  J18.9 486   4. Uncontrolled type 2 diabetes mellitus with hyperglycemia  E11.65 250.02   5. Atrial fibrillation with rapid ventricular response  I48.91 427.31   6. Acute osteomyelitis of right foot  M86.171 730.07   7. Decreased activities of daily living (ADL)  Z78.9 V49.89   8. Difficulty in walking  R26.2 719.7     Patient Active Problem List   Diagnosis    Diabetic ulcer of left heel associated with type 2 DM    Acute osteomyelitis of left calcaneus     Diabetic ulcer of left heel associated with type 2 DM    Diabetic ulcer of right midfoot associated with type 2 DM    Paroxysmal atrial fibrillation    Essential hypertension    Hyperlipidemia LDL goal <100    Cellulitis and abscess of foot    High alkaline phosphatase level    Osteomyelitis    Onychomycosis    Onychocryptosis    Foot pain, bilateral    Osteomyelitis of foot, right, acute    Cellulitis of right foot    Type 2 diabetes mellitus, with long-term current use of insulin    Class 3 severe obesity due to excess calories with serious comorbidity and body mass index (BMI) of 45.0 to 49.9 in adult    Anxiety disorder, unspecified    Claustrophobia    Dependence on wheelchair    Depression, unspecified    Long term (current) use of anticoagulants    Long term (current) use of oral hypoglycemic drugs    Wound of foot    Non-prs chronic ulcer oth prt r foot limited to brkdwn skin    Orthostatic hypotension    Other chronic osteomyelitis, right ankle and foot    Personal history of nicotine dependence    Thrombocytopenia, unspecified    Unspecified open wound, right foot,  "initial encounter    Diabetic foot infection    Subacute osteomyelitis of right foot    Right foot pain    Sepsis    Onychomycosis    Foot pain, left     Past Medical History:   Diagnosis Date    Absence of toe of right foot     Acute osteomyelitis of left calcaneus  8/18/2021    Anxiety and depression     Arthritis     Claustrophobia     Corns and callus     Diabetic ulcer of left heel associated with type 2 DM 8/18/2021    Diabetic ulcer of left heel associated with type 2 DM 7/6/2021    Diabetic ulcer of right midfoot associated with type 2 DM 8/18/2021    Difficulty walking     Essential hypertension 8/31/2021    Hammertoe     Hyperlipidemia LDL goal <100 8/31/2021    Ingrown toenail     Obesity     Paroxysmal atrial fibrillation 8/31/2021    Polyneuropathy     Pressure ulcer, stage 1     Tinea unguium     Type 2 diabetes mellitus with polyneuropathy      Past Surgical History:   Procedure Laterality Date    CYST REMOVAL      center of back; benign    INCISION AND DRAINAGE ABSCESS      back    INCISION AND DRAINAGE LEG Right 12/10/2021    Procedure: INCISION AND DRAINAGE LOWER EXTREMITY;  Surgeon: Ash Leyva DPM;  Location: [facility];  Service: Podiatry;  Laterality: Right;    OTHER SURGICAL HISTORY      Surgical clips left foot    TOE SURGERY Right     Removal of 5th toe    TRANS METATARSAL AMPUTATION Right 12/2/2021    Procedure: AMPUTATION TRANS METATARSAL;  Surgeon: Ash Leyva DPM;  Location: [facility];  Service: Podiatry;  Laterality: Right;    WRIST SURGERY Left     repair of injury     PT Assessment (last 12 hours)       PT Evaluation and Treatment       Row Name 06/08/23 1456          Physical Therapy Time and Intention    Subjective Information complains of;pain (P)   pain in stomach due to \"gas\"  -     Document Type therapy note (daily note) (P)   -     Mode of Treatment individual therapy;physical therapy (P)   -     Patient Effort adequate (P)   -     " Symptoms Noted During/After Treatment none (P)   -       Row Name 06/08/23 1408          General Information    Patient Profile Reviewed yes (P)   -     Patient Observations alert;cooperative;agree to therapy (P)   -     Barriers to Rehab none identified (P)   -[facility] Name 06/08/23 1408          Motor Skills    Therapeutic Exercise hip;knee;ankle (P)   -[facility] Name 06/08/23 1408          Hip (Therapeutic Exercise)    Hip (Therapeutic Exercise) AROM (active range of motion) (P)   -     Hip AROM (Therapeutic Exercise) bilateral;flexion (P)   -[facility] Name 06/08/23 1408          Knee (Therapeutic Exercise)    Knee (Therapeutic Exercise) AROM (active range of motion) (P)   -     Knee AROM (Therapeutic Exercise) bilateral;LAQ (long arc quad);SLR (straight leg raise);heel slides;20 repititions (P)   -[facility] Name 06/08/23 1408          Ankle (Therapeutic Exercise)    Ankle (Therapeutic Exercise) AROM (active range of motion) (P)   [facility]     Ankle AROM (Therapeutic Exercise) dorsiflexion;plantarflexion;left;20 repititions (P)   -       Row Name             Wound 12/02/21 Right anterior foot Incision    Wound - Properties Group Placement Date: 12/02/21  -ES Side: Right  -ES Orientation: anterior  -ES Location: foot  -ES Primary Wound Type: Incision  -ES    Retired Wound - Properties Group Placement Date: 12/02/21  -ES Side: Right  -ES Orientation: anterior  -ES Location: foot  -ES Primary Wound Type: Incision  -ES    Retired Wound - Properties Group Date first assessed: 12/02/21  -ES Side: Right  -ES Location: foot  -ES Primary Wound Type: Incision  -ES      Row Name             Wound 06/22/21 1133 Left lateral heel    Wound - Properties Group Placement Date: 06/22/21  -FABIOLA Placement Time: 1133  -FABIOLA Present on Hospital Admission: Y  -FABIOLA Side: Left  -FABIOLA Orientation: lateral  -FABIOLA Location: heel  -FABIOLA    Retired Wound - Properties Group Placement Date: 06/22/21  -FABIOLA Placement Time: 1133  -FABIOLA  Present on Hospital Admission: Y  -FABIOLA Side: Left  -FABIOLA Orientation: lateral  -FABIOLA Location: heel  -FABIOLA    Retired Wound - Properties Group Date first assessed: 06/22/21  -FABIOLA Time first assessed: 1133  -FABIOLA Present on Hospital Admission: Y  -FABIOLA Side: Left  -FABIOLA Location: heel  -FABIOLA      Row Name             Wound 05/19/23 1317 gluteal Blisters    Wound - Properties Group Placement Date: 05/19/23  -RASHARD Placement Time: 1317  -RASHARD Present on Hospital Admission: N  -RASHARD Location: gluteal  -RASHARD Primary Wound Type: Blisters  -RASHARD    Retired Wound - Properties Group Placement Date: 05/19/23  -RASHARD Placement Time: 1317  -RASHARD Present on Hospital Admission: N  -RASHARD Location: gluteal  -RASHARD Primary Wound Type: Blisters  -RASHARD    Retired Wound - Properties Group Date first assessed: 05/19/23  -RASHARD Time first assessed: 1317  -RASHARD Present on Hospital Admission: N  -RASHARD Location: gluteal  -RASHARD Primary Wound Type: Blisters  -RASHARD      Row Name 06/08/23 1408          Progress Summary (PT)    Daily Progress Summary (PT) Pt declined sitting edge of bed due to abdominal pain he attributed to gas. Pt continues to demonstrate decreased BLE strength and activity tolerance. Pt will continue to benefit from skilled physical therapy in order to increase strength and endurance. (P)   -               User Key  (r) = Recorded By, (t) = Taken By, (c) = Cosigned By      Initials Name Provider Type    Karon Jordan, RN Registered Nurse    Marlen Vaughn, RN Registered Nurse    Katlyn Garcia RN Registered Nurse    Aguilar Valladares, MERLIN Physical Therapist                      PT Recommendation and Plan     Progress Summary (PT)  Progress Toward Functional Goals (PT): progress toward functional goals is fair  Daily Progress Summary (PT): (P) Pt declined sitting edge of bed due to abdominal pain he attributed to gas. Pt continues to demonstrate decreased BLE strength and activity tolerance. Pt will continue to benefit from skilled physical therapy in order to  increase strength and endurance.   Outcome Measures       Row Name 06/07/23 1122 06/05/23 1500          How much help from another person do you currently need...    Turning from your back to your side while in flat bed without using bedrails? 3  -DK 3  -MOE (r) JH (t) MOE (c)     Moving from lying on back to sitting on the side of a flat bed without bedrails? 3  -DK 2  -MOE (r) JH (t) MOE (c)     Moving to and from a bed to a chair (including a wheelchair)? 2  -DK 2  -MOE (r) JH (t) MOE (c)     Standing up from a chair using your arms (e.g., wheelchair, bedside chair)? 2  -DK 2  -MOE (r) JH (t) MOE (c)     Climbing 3-5 steps with a railing? 1  -DK 1  -MOE (r) JH (t) MOE (c)     To walk in hospital room? 1  -DK 1  -MOE (r) JH (t) MOE (c)     AM-PAC 6 Clicks Score (PT) 12  -DK 11  -MOE (r) JH (t)        Functional Assessment    Outcome Measure Options AM-PAC 6 Clicks Basic Mobility (PT)  -DK AM-PAC 6 Clicks Basic Mobility (PT)  -MOE (r) JH (t) MOE (c)               User Key  (r) = Recorded By, (t) = Taken By, (c) = Cosigned By      Initials Name Provider Type    Mckenna Landaverde, PTA Physical Therapist Assistant    Merlin De La O, PT Physical Therapist    Aguilar Valladares PT Physical Therapist                     Time Calculation:    PT Charges       Row Name 06/08/23 1405             Time Calculation    PT Received On 06/08/23 (P)   -         Timed Charges    33346 - PT Therapeutic Exercise Minutes 15 (P)   -         Total Minutes    Timed Charges Total Minutes 15 (P)   -       Total Minutes 15 (P)   -                User Key  (r) = Recorded By, (t) = Taken By, (c) = Cosigned By      Initials Name Provider Type    Aguilar Valladares, MERLIN Physical Therapist                  Therapy Charges for Today       Code Description Service Date Service Provider Modifiers Qty    74975896883 HC PT THER PROC EA 15 MIN 6/8/2023 Hurst, Aguilar, PT GP 1            PT G-Codes  Outcome Measure Options: AM-PAC 6 Clicks Basic Mobility  (PT)  AM-PAC 6 Clicks Score (PT): 11  AM-PAC 6 Clicks Score (OT): 17    Aguilar Scott, PT  6/8/2023

## 2023-06-08 NOTE — PLAN OF CARE
Goal Outcome Evaluation:  Alert and oriented x 4. Pt encouraged to participate in activity and reposition in bed. Blood glucose monitored. Medicated for pain and muscle spasms per orders. Skin/wound care performed per orders. No other complaints at this time.

## 2023-06-09 LAB
GLUCOSE BLDC GLUCOMTR-MCNC: 154 MG/DL (ref 70–99)
GLUCOSE BLDC GLUCOMTR-MCNC: 242 MG/DL (ref 70–99)
GLUCOSE BLDC GLUCOMTR-MCNC: 257 MG/DL (ref 70–99)
GLUCOSE BLDC GLUCOMTR-MCNC: 283 MG/DL (ref 70–99)

## 2023-06-09 PROCEDURE — 63710000001 INSULIN LISPRO (HUMAN) PER 5 UNITS: Performed by: INTERNAL MEDICINE

## 2023-06-09 PROCEDURE — 94799 UNLISTED PULMONARY SVC/PX: CPT

## 2023-06-09 PROCEDURE — 97110 THERAPEUTIC EXERCISES: CPT

## 2023-06-09 PROCEDURE — 63710000001 INSULIN DETEMIR PER 5 UNITS: Performed by: INTERNAL MEDICINE

## 2023-06-09 PROCEDURE — 99232 SBSQ HOSP IP/OBS MODERATE 35: CPT | Performed by: INTERNAL MEDICINE

## 2023-06-09 PROCEDURE — 82948 REAGENT STRIP/BLOOD GLUCOSE: CPT

## 2023-06-09 PROCEDURE — 63710000001 INSULIN LISPRO (HUMAN) PER 5 UNITS

## 2023-06-09 RX ADMIN — Medication 250 MG: at 21:45

## 2023-06-09 RX ADMIN — DICLOFENAC 2 G: 10 GEL TOPICAL at 22:23

## 2023-06-09 RX ADMIN — AMOXICILLIN 1000 MG: 500 CAPSULE ORAL at 21:44

## 2023-06-09 RX ADMIN — APIXABAN 5 MG: 5 TABLET, FILM COATED ORAL at 21:45

## 2023-06-09 RX ADMIN — Medication 10 ML: at 21:44

## 2023-06-09 RX ADMIN — INSULIN LISPRO 10 UNITS: 100 INJECTION, SOLUTION INTRAVENOUS; SUBCUTANEOUS at 08:36

## 2023-06-09 RX ADMIN — INSULIN LISPRO 10 UNITS: 100 INJECTION, SOLUTION INTRAVENOUS; SUBCUTANEOUS at 12:13

## 2023-06-09 RX ADMIN — Medication 10 ML: at 08:36

## 2023-06-09 RX ADMIN — INSULIN LISPRO 10 UNITS: 100 INJECTION, SOLUTION INTRAVENOUS; SUBCUTANEOUS at 18:45

## 2023-06-09 RX ADMIN — DICLOFENAC 2 G: 10 GEL TOPICAL at 18:46

## 2023-06-09 RX ADMIN — LOPERAMIDE HYDROCHLORIDE 2 MG: 2 CAPSULE ORAL at 12:12

## 2023-06-09 RX ADMIN — Medication 250 MG: at 08:37

## 2023-06-09 RX ADMIN — OXYCODONE HYDROCHLORIDE 10 MG: 5 TABLET ORAL at 08:44

## 2023-06-09 RX ADMIN — INSULIN LISPRO 4 UNITS: 100 INJECTION, SOLUTION INTRAVENOUS; SUBCUTANEOUS at 08:36

## 2023-06-09 RX ADMIN — SIMETHICONE 80 MG: 80 TABLET, CHEWABLE ORAL at 21:45

## 2023-06-09 RX ADMIN — PREGABALIN 25 MG: 25 CAPSULE ORAL at 07:37

## 2023-06-09 RX ADMIN — INSULIN DETEMIR 52 UNITS: 100 INJECTION, SOLUTION SUBCUTANEOUS at 22:23

## 2023-06-09 RX ADMIN — PREGABALIN 25 MG: 25 CAPSULE ORAL at 13:49

## 2023-06-09 RX ADMIN — INSULIN LISPRO 3 UNITS: 100 INJECTION, SOLUTION INTRAVENOUS; SUBCUTANEOUS at 18:45

## 2023-06-09 RX ADMIN — AMOXICILLIN 1000 MG: 500 CAPSULE ORAL at 07:36

## 2023-06-09 RX ADMIN — INSULIN LISPRO 4 UNITS: 100 INJECTION, SOLUTION INTRAVENOUS; SUBCUTANEOUS at 22:22

## 2023-06-09 RX ADMIN — SIMETHICONE 80 MG: 80 TABLET, CHEWABLE ORAL at 12:12

## 2023-06-09 RX ADMIN — SIMETHICONE 80 MG: 80 TABLET, CHEWABLE ORAL at 07:36

## 2023-06-09 RX ADMIN — DIGOXIN 125 MCG: 125 TABLET ORAL at 12:12

## 2023-06-09 RX ADMIN — OXYCODONE HYDROCHLORIDE 10 MG: 5 TABLET ORAL at 21:44

## 2023-06-09 RX ADMIN — PREGABALIN 25 MG: 25 CAPSULE ORAL at 21:45

## 2023-06-09 RX ADMIN — METOPROLOL SUCCINATE 37.5 MG: 25 TABLET, EXTENDED RELEASE ORAL at 21:44

## 2023-06-09 RX ADMIN — INSULIN LISPRO 2 UNITS: 100 INJECTION, SOLUTION INTRAVENOUS; SUBCUTANEOUS at 12:13

## 2023-06-09 RX ADMIN — APIXABAN 5 MG: 5 TABLET, FILM COATED ORAL at 08:37

## 2023-06-09 RX ADMIN — AMOXICILLIN 1000 MG: 500 CAPSULE ORAL at 13:49

## 2023-06-09 RX ADMIN — METOPROLOL SUCCINATE 37.5 MG: 25 TABLET, EXTENDED RELEASE ORAL at 08:37

## 2023-06-09 RX ADMIN — FAMOTIDINE 40 MG: 20 TABLET ORAL at 08:37

## 2023-06-09 RX ADMIN — INSULIN DETEMIR 52 UNITS: 100 INJECTION, SOLUTION SUBCUTANEOUS at 08:37

## 2023-06-09 RX ADMIN — ATORVASTATIN CALCIUM 10 MG: 10 TABLET, FILM COATED ORAL at 22:22

## 2023-06-09 NOTE — PLAN OF CARE
Goal Outcome Evaluation:    This nurse took over patient care at 1715. AAOx4, VSS. Remains on SSI, insulin given as ordered. Wound care completed by previous nurse this shift. Remains on fluid restriction and tolerating well. Rehab placement pending. Continue to monitor with current POC.

## 2023-06-09 NOTE — THERAPY TREATMENT NOTE
Acute Care - Physical Therapy Treatment Note  KEO Dominguez     Patient Name: Jose Shaikh  : 1960  MRN: [id number]  Today's Date: 2023      Visit Dx:     ICD-10-CM ICD-9-CM   1. Acute febrile illness  R50.9 780.60   2. Sepsis without acute organ dysfunction, due to unspecified organism  A41.9 038.9     995.91   3. Pneumonia of right upper lobe due to infectious organism  J18.9 486   4. Uncontrolled type 2 diabetes mellitus with hyperglycemia  E11.65 250.02   5. Atrial fibrillation with rapid ventricular response  I48.91 427.31   6. Acute osteomyelitis of right foot  M86.171 730.07   7. Decreased activities of daily living (ADL)  Z78.9 V49.89   8. Difficulty in walking  R26.2 719.7     Patient Active Problem List   Diagnosis   • Diabetic ulcer of left heel associated with type 2 DM   • Acute osteomyelitis of left calcaneus    • Diabetic ulcer of left heel associated with type 2 DM   • Diabetic ulcer of right midfoot associated with type 2 DM   • Paroxysmal atrial fibrillation   • Essential hypertension   • Hyperlipidemia LDL goal <100   • Cellulitis and abscess of foot   • High alkaline phosphatase level   • Osteomyelitis   • Onychomycosis   • Onychocryptosis   • Foot pain, bilateral   • Osteomyelitis of foot, right, acute   • Cellulitis of right foot   • Type 2 diabetes mellitus, with long-term current use of insulin   • Class 3 severe obesity due to excess calories with serious comorbidity and body mass index (BMI) of 45.0 to 49.9 in adult   • Anxiety disorder, unspecified   • Claustrophobia   • Dependence on wheelchair   • Depression, unspecified   • Long term (current) use of anticoagulants   • Long term (current) use of oral hypoglycemic drugs   • Wound of foot   • Non-prs chronic ulcer oth prt r foot limited to brkdwn skin   • Orthostatic hypotension   • Other chronic osteomyelitis, right ankle and foot   • Personal history of nicotine dependence   • Thrombocytopenia, unspecified   • Unspecified open  wound, right foot, initial encounter   • Diabetic foot infection   • Subacute osteomyelitis of right foot   • Right foot pain   • Sepsis   • Onychomycosis   • Foot pain, left     Past Medical History:   Diagnosis Date   • Absence of toe of right foot    • Acute osteomyelitis of left calcaneus  8/18/2021   • Anxiety and depression    • Arthritis    • Claustrophobia    • Corns and callus    • Diabetic ulcer of left heel associated with type 2 DM 8/18/2021   • Diabetic ulcer of left heel associated with type 2 DM 7/6/2021   • Diabetic ulcer of right midfoot associated with type 2 DM 8/18/2021   • Difficulty walking    • Essential hypertension 8/31/2021   • Hammertoe    • Hyperlipidemia LDL goal <100 8/31/2021   • Ingrown toenail    • Obesity    • Paroxysmal atrial fibrillation 8/31/2021   • Polyneuropathy    • Pressure ulcer, stage 1    • Tinea unguium    • Type 2 diabetes mellitus with polyneuropathy      Past Surgical History:   Procedure Laterality Date   • CYST REMOVAL      center of back; benign   • INCISION AND DRAINAGE ABSCESS      back   • INCISION AND DRAINAGE LEG Right 12/10/2021    Procedure: INCISION AND DRAINAGE LOWER EXTREMITY;  Surgeon: Ash Leyva DPM;  Location: [facility];  Service: Podiatry;  Laterality: Right;   • OTHER SURGICAL HISTORY      Surgical clips left foot   • TOE SURGERY Right     Removal of 5th toe   • TRANS METATARSAL AMPUTATION Right 12/2/2021    Procedure: AMPUTATION TRANS METATARSAL;  Surgeon: Ash Leyva DPM;  Location: [facility];  Service: Podiatry;  Laterality: Right;   • WRIST SURGERY Left     repair of injury     PT Assessment (last 12 hours)     PT Evaluation and Treatment     Row Name 06/09/23 1500          Physical Therapy Time and Intention    Subjective Information no complaints (P)   -MB     Document Type therapy note (daily note) (P)   -MB     Mode of Treatment individual therapy (P)   -MB     Patient Effort good (P)   -MB     Symptoms  Noted During/After Treatment none (P)   -MB     Row Name 06/09/23 1500          Motor Skills    Therapeutic Exercise other (see comments) (P)   Pt performed BLE in supine including SLR 2x15 AA required on RLE, ankle pumps x30, Heel slides x15  -MB     Row Name             Wound 12/02/21 Right anterior foot Incision    Wound - Properties Group Placement Date: 12/02/21  -ES Side: Right  -ES Orientation: anterior  -ES Location: foot  -ES Primary Wound Type: Incision  -ES    Retired Wound - Properties Group Placement Date: 12/02/21  -ES Side: Right  -ES Orientation: anterior  -ES Location: foot  -ES Primary Wound Type: Incision  -ES    Retired Wound - Properties Group Date first assessed: 12/02/21  -ES Side: Right  -ES Location: foot  -ES Primary Wound Type: Incision  -ES    Row Name             Wound 06/22/21 1133 Left lateral heel    Wound - Properties Group Placement Date: 06/22/21  -FABIOLA Placement Time: 1133  -FABIOLA Present on Hospital Admission: Y  -FABIOLA Side: Left  -FABIOLA Orientation: lateral  -FABIOLA Location: heel  -FABIOLA    Retired Wound - Properties Group Placement Date: 06/22/21  -FABIOLA Placement Time: 1133  -FABIOLA Present on Hospital Admission: Y  -FABIOLA Side: Left  -FABIOLA Orientation: lateral  -FABIOLA Location: heel  -FABIOLA    Retired Wound - Properties Group Date first assessed: 06/22/21  -FABIOLA Time first assessed: 1133  -FABIOLA Present on Hospital Admission: Y  -FABIOLA Side: Left  -FABIOLA Location: heel  -FABIOLA    Row Name             Wound 05/19/23 1317 gluteal Blisters    Wound - Properties Group Placement Date: 05/19/23  -RASHARD Placement Time: 1317  -RASHARD Present on Hospital Admission: N  -RASHARD Location: gluteal  -RASHARD Primary Wound Type: Blisters  -RASHARD    Retired Wound - Properties Group Placement Date: 05/19/23  -RASHARD Placement Time: 1317  -RASHARD Present on Hospital Admission: N  -RASHARD Location: gluteal  -RASHARD Primary Wound Type: Blisters  -RASHARD    Retired Wound - Properties Group Date first assessed: 05/19/23  -RASHARD Time first assessed: 1317  -RASHARD Present on Hospital  Admission: N  -RASHARD Location: gluteal  -RASHARD Primary Wound Type: Blisters  -RASHARD    Row Name 06/09/23 1500          Plan of Care Review    Plan of Care Reviewed With patient (P)   -MB     Progress no change (P)   -MB     Outcome Evaluation Pt tolerated treatment fairly well today. He declined to attempt bed mobility and transfers but  was willing to perform TherEx. He will benefit from continued PT intervention. (P)   -MB     Row Name 06/09/23 1500          Positioning and Restraints    Pre-Treatment Position in bed (P)   -MB     Post Treatment Position bed (P)   -MB     In Bed supine (P)   -MB           User Key  (r) = Recorded By, (t) = Taken By, (c) = Cosigned By    Initials Name Provider Type    Karon Jordan, RN Registered Nurse    Marlen Vaughn RN Registered Nurse    Katlyn Garcia RN Registered Nurse    Gilbert Bennett, PT Student PT Student                  PT Recommendation and Plan     Plan of Care Reviewed With: (P) patient  Progress: (P) no change  Outcome Evaluation: (P) Pt tolerated treatment fairly well today. He declined to attempt bed mobility and transfers but  was willing to perform TherEx. He will benefit from continued PT intervention.   Outcome Measures     Row Name 06/09/23 1500 06/07/23 1122          How much help from another person do you currently need...    Turning from your back to your side while in flat bed without using bedrails? 3 (P)   -MB 3  -DK     Moving from lying on back to sitting on the side of a flat bed without bedrails? 2 (P)   -MB 3  -DK     Moving to and from a bed to a chair (including a wheelchair)? 2 (P)   -MB 2  -DK     Standing up from a chair using your arms (e.g., wheelchair, bedside chair)? 2 (P)   -MB 2  -DK     Climbing 3-5 steps with a railing? 1 (P)   -MB 1  -DK     To walk in hospital room? 2 (P)   -MB 1  -DK     AM-PAC 6 Clicks Score (PT) 12 (P)   -MB 12  -DK        Functional Assessment    Outcome Measure Options AM-PAC 6 Clicks Basic Mobility  (PT) (P)   -MB AM-PAC 6 Clicks Basic Mobility (PT)  -DK           User Key  (r) = Recorded By, (t) = Taken By, (c) = Cosigned By    Initials Name Provider Type    Mckenna Landaverde, PTA Physical Therapist Assistant    Gilbert Bennett, PT Student PT Student                 Time Calculation:    PT Charges     Row Name 06/09/23 1506             Time Calculation    PT Received On 06/09/23 (P)   -MB         Timed Charges    60764 - PT Therapeutic Exercise Minutes 8 (P)   -MB         Total Minutes    Timed Charges Total Minutes 8 (P)   -MB       Total Minutes 8 (P)   -MB            User Key  (r) = Recorded By, (t) = Taken By, (c) = Cosigned By    Initials Name Provider Type    Gilbert Bennett, PT Student PT Student              Therapy Charges for Today     Code Description Service Date Service Provider Modifiers Qty    69878220777 HC PT THER PROC EA 15 MIN 6/9/2023 Gilbert Seymour, PT Student GP 1          PT G-Codes  Outcome Measure Options: (P) AM-PAC 6 Clicks Basic Mobility (PT)  AM-PAC 6 Clicks Score (PT): (P) 12  AM-PAC 6 Clicks Score (OT): 17    Gilbert Seymour PT Student  6/9/2023

## 2023-06-09 NOTE — PLAN OF CARE
Goal Outcome Evaluation:      Patient alert and oriented x4. Medicated once with prn pain medication per MAR instructions. Patient utilizing urinal independently this shift.

## 2023-06-09 NOTE — PROGRESS NOTES
[facility]   Hospitalist Progress Note    Date of admission: 5/12/2023  Patient Name: Jose Shaikh  1960  Date: 6/9/2023      Subjective     Chief Complaint   Patient presents with   • Hip Pain   • Shoulder Pain       Summary: 62 y.o. male with hx of osteomyelitis (recent discharge for similar), dm2 and recurrent/chronic foot infections, afib, morbid obesity who presented with worsening hip pain.         Patient was recently admitted to the hospital in early April for foot infection.  There was recommendation for amputation due to osteomyelitis in the foot and the patient refused.  He was unable to be placed at that time.  He is a sex offender making it very difficult for placement.  Pt treated with oral abx with doxy/cipro for Alcaligenes facialis and Morganella morganii last admission. He follows with wound care/Dr Daniels and had attempted to switch antibiotics to Levaquin per pharmacy recommendations based off culture data but was unable to get hold the patient so he was never switched.      Treating for right foot osteomyelitis and bacteremia with 6 week course with amoxicillin per ID.  Debilitated and working on rehab placement.  For hip pain - X-ray of pelvis and hip shows DJD of bilateral hips left worse than right.  Patient refused MRI of the left hip and order was canceled as patient is very claustrophobic.      Interval Followup: bm reasonable; no abd pain.  No n/v     Objective     Vitals:   Temp:  [97.34 °F (36.3 °C)-98.7 °F (37.1 °C)] 97.7 °F (36.5 °C)  Heart Rate:  [66-72] 72  Resp:  [18-20] 18  BP: (115-134)/(55-68) 134/56    Physical Exam  Awake, conversant, morbidly obese, appears older than stated age  B/l foot wounds dressed, no acute drainage  Abd obese, soft, no guarding, no acute ttp  Breathing comfortably on room air  Rrr  Aox3     Result Review:  Vital signs, labs and recent relevant imaging reviewed.      acetaminophen, 1,000 mg, Oral, Q8H  amoxicillin, 1,000 mg, Oral,  Q8H  apixaban, 5 mg, Oral, Q12H  atorvastatin, 10 mg, Oral, Nightly  Diclofenac Sodium, 2 g, Topical, 4x Daily  digoxin, 125 mcg, Oral, Daily  famotidine, 40 mg, Oral, Daily  insulin detemir, 52 Units, Subcutaneous, Q12H  insulin lispro, 10 Units, Subcutaneous, TID With Meals  insulin lispro, 2-7 Units, Subcutaneous, 4x Daily With Meals & Nightly  metoprolol succinate XL, 37.5 mg, Oral, Q12H  pregabalin, 25 mg, Oral, Q8H  saccharomyces boulardii, 250 mg, Oral, BID  simethicone, 80 mg, Oral, 4x Daily AC & at Bedtime  sodium chloride, 10 mL, Intravenous, Q12H        •  aluminum-magnesium hydroxide-simethicone  •  [DISCONTINUED] senna-docusate sodium **AND** polyethylene glycol **AND** bisacodyl **AND** bisacodyl  •  calcium carbonate  •  cyclobenzaprine  •  dextrose  •  dextrose  •  glucagon (human recombinant)  •  loperamide  •  melatonin  •  metoprolol tartrate  •  [] Morphine **AND** naloxone  •  ondansetron  •  oxyCODONE  •  phenylephrine-mineral oil-petrolatum  •  [COMPLETED] Insert peripheral IV **AND** sodium chloride  •  sodium chloride  •  sodium chloride      Assessment / Plan     Assessment/Plan:  Sepsis poa 2/2 pna, bacteremia  RUL healthcare associated pneumonia  Streptococcus agalactiae bacteremia  Chronic osteomyelitis of right foot involving cuboid and maybe cuneiform.  On p.o. amoxicillin for 6 weeks  Cellulitis of right foot due to Streptococcus agalactiae.  Paroxysmal A-fib with intermittent RVR on Eliquis.  Heart rates have been well controlled  Bilateral diabetic foot ulcers.  NIDDM.  Most recent A1c 6.6 in 2023  Morbid obesity BMI of 44.8, down from 48.1 on admission  S/p TMA of right foot.  Hyponatremia.  likely from hyperglycemia/volume overload.  Clinically significant.  Resolved.  Hypophosphatemia.  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  Chronic shoulder neck pain  Hypertension.  Blood pressure stable.  Diarrhea most likely antibiotic associated  Prolonged  hospitalization, admitted 5/12/23    • continue scheduled simethicone, monitor bowels  • voltaren for joint pain, continue additional prn/pain medications as above  • HR reasonable on current regimen, cont digoxin, metop succinate,   o bp reasonable off midodrine currently, cont to monitor  o Cont apixaban for AC  • Cont po amoxicillin for osteomyelitis until 6/23, florastor   • Levemir, ssi, lyrica, monitor blood glucose, titrate   • Cont famotidine  • C diff negative, prn loperamide if needed, discontinued scheduled bowel reg  • Continue hospital monitoring and treatment at current level of care - does not need upgraded at this time.  • Discuss with kalen, rn     Rehab placement difficulties, cont tx and monitoring as above       DVT prophylaxis:  Medical DVT prophylaxis orders are present.    Level Of Support Discussed With: Patient  Code Status (Patient has no pulse and is not breathing): CPR (Attempt to Resuscitate)  Medical Interventions (Patient has pulse or is breathing): Full Support        CBC        6/1/2023    06:29 6/6/2023    04:52 6/8/2023    05:20   CBC   WBC 4.20  3.62  4.01    RBC 4.37  4.61  4.50    Hemoglobin 10.6  11.3  10.7    Hematocrit 35.0  36.7  36.0    MCV 80.1  79.6  80.0    MCH 24.3  24.5  23.8    MCHC 30.3  30.8  29.7    RDW 16.9  16.5  16.4    Platelets 153  152  152      CMP        6/1/2023    06:29 6/6/2023    04:52 6/8/2023    05:20   CMP   Glucose 203  217  215    BUN 9  11  9    Creatinine 0.53  0.59  0.52    EGFR 111.9  108.3  112.6    Sodium 136  C 138  136    Potassium 4.5  C 3.8  4.2    Chloride 100  C 101  102    Calcium 8.4  8.8  8.4    Total Protein  6.9     Albumin  2.7     Globulin  4.2     Total Bilirubin  0.6     Alkaline Phosphatase  321     AST (SGOT)  32     ALT (SGPT)  18     Albumin/Globulin Ratio  0.6     BUN/Creatinine Ratio 17.0  18.6  17.3    Anion Gap 6.2  C 7.3  8.1     Details         C Corrected result

## 2023-06-10 LAB
GLUCOSE BLDC GLUCOMTR-MCNC: 174 MG/DL (ref 70–99)
GLUCOSE BLDC GLUCOMTR-MCNC: 186 MG/DL (ref 70–99)
GLUCOSE BLDC GLUCOMTR-MCNC: 210 MG/DL (ref 70–99)
GLUCOSE BLDC GLUCOMTR-MCNC: 216 MG/DL (ref 70–99)

## 2023-06-10 PROCEDURE — 63710000001 INSULIN LISPRO (HUMAN) PER 5 UNITS

## 2023-06-10 PROCEDURE — 94799 UNLISTED PULMONARY SVC/PX: CPT

## 2023-06-10 PROCEDURE — 99232 SBSQ HOSP IP/OBS MODERATE 35: CPT | Performed by: INTERNAL MEDICINE

## 2023-06-10 PROCEDURE — 63710000001 INSULIN LISPRO (HUMAN) PER 5 UNITS: Performed by: INTERNAL MEDICINE

## 2023-06-10 PROCEDURE — 82948 REAGENT STRIP/BLOOD GLUCOSE: CPT

## 2023-06-10 PROCEDURE — 63710000001 INSULIN DETEMIR PER 5 UNITS: Performed by: INTERNAL MEDICINE

## 2023-06-10 RX ORDER — INSULIN LISPRO 100 [IU]/ML
12 INJECTION, SOLUTION INTRAVENOUS; SUBCUTANEOUS
Status: DISCONTINUED | OUTPATIENT
Start: 2023-06-10 | End: 2023-06-14

## 2023-06-10 RX ADMIN — INSULIN LISPRO 12 UNITS: 100 INJECTION, SOLUTION INTRAVENOUS; SUBCUTANEOUS at 12:50

## 2023-06-10 RX ADMIN — Medication 10 ML: at 22:22

## 2023-06-10 RX ADMIN — INSULIN LISPRO 3 UNITS: 100 INJECTION, SOLUTION INTRAVENOUS; SUBCUTANEOUS at 22:14

## 2023-06-10 RX ADMIN — Medication 10 ML: at 08:22

## 2023-06-10 RX ADMIN — ACETAMINOPHEN 1000 MG: 325 TABLET ORAL at 22:21

## 2023-06-10 RX ADMIN — AMOXICILLIN 1000 MG: 500 CAPSULE ORAL at 14:04

## 2023-06-10 RX ADMIN — INSULIN LISPRO 2 UNITS: 100 INJECTION, SOLUTION INTRAVENOUS; SUBCUTANEOUS at 12:50

## 2023-06-10 RX ADMIN — METOPROLOL SUCCINATE 37.5 MG: 25 TABLET, EXTENDED RELEASE ORAL at 22:19

## 2023-06-10 RX ADMIN — SIMETHICONE 80 MG: 80 TABLET, CHEWABLE ORAL at 22:18

## 2023-06-10 RX ADMIN — INSULIN DETEMIR 52 UNITS: 100 INJECTION, SOLUTION SUBCUTANEOUS at 22:15

## 2023-06-10 RX ADMIN — INSULIN DETEMIR 52 UNITS: 100 INJECTION, SOLUTION SUBCUTANEOUS at 08:22

## 2023-06-10 RX ADMIN — INSULIN LISPRO 10 UNITS: 100 INJECTION, SOLUTION INTRAVENOUS; SUBCUTANEOUS at 08:21

## 2023-06-10 RX ADMIN — ATORVASTATIN CALCIUM 10 MG: 10 TABLET, FILM COATED ORAL at 22:19

## 2023-06-10 RX ADMIN — PREGABALIN 25 MG: 25 CAPSULE ORAL at 22:20

## 2023-06-10 RX ADMIN — INSULIN LISPRO 2 UNITS: 100 INJECTION, SOLUTION INTRAVENOUS; SUBCUTANEOUS at 17:53

## 2023-06-10 RX ADMIN — INSULIN LISPRO 12 UNITS: 100 INJECTION, SOLUTION INTRAVENOUS; SUBCUTANEOUS at 17:53

## 2023-06-10 RX ADMIN — SIMETHICONE 80 MG: 80 TABLET, CHEWABLE ORAL at 08:21

## 2023-06-10 RX ADMIN — Medication 250 MG: at 22:18

## 2023-06-10 RX ADMIN — SIMETHICONE 80 MG: 80 TABLET, CHEWABLE ORAL at 12:50

## 2023-06-10 RX ADMIN — CYCLOBENZAPRINE 10 MG: 10 TABLET, FILM COATED ORAL at 03:57

## 2023-06-10 RX ADMIN — CYCLOBENZAPRINE 10 MG: 10 TABLET, FILM COATED ORAL at 22:20

## 2023-06-10 RX ADMIN — DIGOXIN 125 MCG: 125 TABLET ORAL at 12:50

## 2023-06-10 RX ADMIN — AMOXICILLIN 1000 MG: 500 CAPSULE ORAL at 22:19

## 2023-06-10 RX ADMIN — Medication 250 MG: at 08:21

## 2023-06-10 RX ADMIN — FAMOTIDINE 40 MG: 20 TABLET ORAL at 08:21

## 2023-06-10 RX ADMIN — INSULIN LISPRO 3 UNITS: 100 INJECTION, SOLUTION INTRAVENOUS; SUBCUTANEOUS at 08:22

## 2023-06-10 RX ADMIN — METOPROLOL SUCCINATE 37.5 MG: 25 TABLET, EXTENDED RELEASE ORAL at 08:21

## 2023-06-10 RX ADMIN — SIMETHICONE 80 MG: 80 TABLET, CHEWABLE ORAL at 17:53

## 2023-06-10 RX ADMIN — APIXABAN 5 MG: 5 TABLET, FILM COATED ORAL at 22:15

## 2023-06-10 RX ADMIN — PREGABALIN 25 MG: 25 CAPSULE ORAL at 06:00

## 2023-06-10 RX ADMIN — APIXABAN 5 MG: 5 TABLET, FILM COATED ORAL at 08:21

## 2023-06-10 RX ADMIN — PREGABALIN 25 MG: 25 CAPSULE ORAL at 14:04

## 2023-06-10 RX ADMIN — PREGABALIN 25 MG: 25 CAPSULE ORAL at 22:15

## 2023-06-10 RX ADMIN — ACETAMINOPHEN 1000 MG: 325 TABLET ORAL at 06:00

## 2023-06-10 RX ADMIN — AMOXICILLIN 1000 MG: 500 CAPSULE ORAL at 06:00

## 2023-06-10 NOTE — PLAN OF CARE
Goal Outcome Evaluation:      PT RESTING QUIETLY IN BED, NO REPORTS OF PAIN AT THIS TIME. CALL LIGHT IN REACH.

## 2023-06-10 NOTE — PROGRESS NOTES
[facility]   Hospitalist Progress Note    Date of admission: 5/12/2023  Patient Name: Jose Shaikh  1960  Date: 6/10/2023      Subjective     Chief Complaint   Patient presents with    Hip Pain    Shoulder Pain       Summary: 62 y.o. male with hx of osteomyelitis (recent discharge for similar), dm2 and recurrent/chronic foot infections, afib, morbid obesity who presented with worsening hip pain.         Patient was recently admitted to the hospital in early April for foot infection.  There was recommendation for amputation due to osteomyelitis in the foot and the patient refused.  He was unable to be placed at that time.  He is a sex offender making it very difficult for placement.  Pt treated with oral abx with doxy/cipro for Alcaligenes facialis and Morganella morganii last admission. He follows with wound care/Dr Daniels and had attempted to switch antibiotics to Levaquin per pharmacy recommendations based off culture data but was unable to get hold the patient so he was never switched.      Treating for right foot osteomyelitis and bacteremia with 6 week course with amoxicillin per ID.  Debilitated and working on rehab placement.  For hip pain - X-ray of pelvis and hip shows DJD of bilateral hips left worse than right.  Patient refused MRI of the left hip and order was canceled as patient is very claustrophobic.      Interval Followup: no aeon.    Objective     Vitals:   Temp:  [97.7 °F (36.5 °C)-98.7 °F (37.1 °C)] 98.2 °F (36.8 °C)  Heart Rate:  [72-84] 72  Resp:  [18-20] 18  BP: (120-142)/(56-75) 132/62    Physical Exam  Resting comfortably  Nonlabored breathing on room air   rrr  B/l foot wounds dressed, no acute drainage    Result Review:  Vital signs, labs and recent relevant imaging reviewed.      acetaminophen, 1,000 mg, Oral, Q8H  amoxicillin, 1,000 mg, Oral, Q8H  apixaban, 5 mg, Oral, Q12H  atorvastatin, 10 mg, Oral, Nightly  Diclofenac Sodium, 2 g, Topical, 4x Daily  digoxin, 125 mcg, Oral,  Daily  famotidine, 40 mg, Oral, Daily  insulin detemir, 52 Units, Subcutaneous, Q12H  insulin lispro, 12 Units, Subcutaneous, TID With Meals  insulin lispro, 2-7 Units, Subcutaneous, 4x Daily With Meals & Nightly  metoprolol succinate XL, 37.5 mg, Oral, Q12H  pregabalin, 25 mg, Oral, Q8H  saccharomyces boulardii, 250 mg, Oral, BID  simethicone, 80 mg, Oral, 4x Daily AC & at Bedtime  sodium chloride, 10 mL, Intravenous, Q12H          aluminum-magnesium hydroxide-simethicone    [DISCONTINUED] senna-docusate sodium **AND** polyethylene glycol **AND** bisacodyl **AND** bisacodyl    calcium carbonate    cyclobenzaprine    dextrose    dextrose    glucagon (human recombinant)    loperamide    melatonin    metoprolol tartrate    [] Morphine **AND** naloxone    ondansetron    oxyCODONE    phenylephrine-mineral oil-petrolatum    [COMPLETED] Insert peripheral IV **AND** sodium chloride    sodium chloride    sodium chloride      Assessment / Plan     Assessment/Plan:  Sepsis poa 2/2 pna, bacteremia  RUL healthcare associated pneumonia  Streptococcus agalactiae bacteremia  Chronic osteomyelitis of right foot involving cuboid and maybe cuneiform.  On p.o. amoxicillin for 6 weeks  Cellulitis of right foot due to Streptococcus agalactiae.  Paroxysmal A-fib with intermittent RVR on Eliquis.  Heart rates have been well controlled  Bilateral diabetic foot ulcers.  NIDDM.  Most recent A1c 6.6 in 2023  Morbid obesity BMI of 44.8, down from 48.1 on admission  S/p TMA of right foot.  Hyponatremia.  likely from hyperglycemia/volume overload.  Clinically significant.  Resolved.  Hypophosphatemia.  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  Chronic shoulder neck pain  Hypertension.  Blood pressure stable.  Diarrhea most likely antibiotic associated  Prolonged hospitalization, admitted 23    continue scheduled simethicone, monitor bowels  voltaren for joint pain, continue additional prn/pain medications as  above  HR reasonable on current regimen, cont digoxin, metop succinate, BP stable off of midodrine  Continue apixaban for pafib  Cont po amoxicillin for osteomyelitis until 6/23, florastor   Levemir, ssi, lyrica, monitor blood glucose, titrate further today for higher blood glucose   Cont famotidine  C diff negative, prn loperamide if needed, discontinued scheduled bowel reg  Continue hospital monitoring and treatment at current level of care - does not need upgraded at this time.  Discuss with kalen, rn     Rehab placement difficulties, cont tx and monitoring as above       DVT prophylaxis:  Medical DVT prophylaxis orders are present.    Level Of Support Discussed With: Patient  Code Status (Patient has no pulse and is not breathing): CPR (Attempt to Resuscitate)  Medical Interventions (Patient has pulse or is breathing): Full Support        CBC          6/1/2023    06:29 6/6/2023    04:52 6/8/2023    05:20   CBC   WBC 4.20  3.62  4.01    RBC 4.37  4.61  4.50    Hemoglobin 10.6  11.3  10.7    Hematocrit 35.0  36.7  36.0    MCV 80.1  79.6  80.0    MCH 24.3  24.5  23.8    MCHC 30.3  30.8  29.7    RDW 16.9  16.5  16.4    Platelets 153  152  152      CMP          6/1/2023    06:29 6/6/2023    04:52 6/8/2023    05:20   CMP   Glucose 203  217  215    BUN 9  11  9    Creatinine 0.53  0.59  0.52    EGFR 111.9  108.3  112.6    Sodium 136  C 138  136    Potassium 4.5  C 3.8  4.2    Chloride 100  C 101  102    Calcium 8.4  8.8  8.4    Total Protein  6.9     Albumin  2.7     Globulin  4.2     Total Bilirubin  0.6     Alkaline Phosphatase  321     AST (SGOT)  32     ALT (SGPT)  18     Albumin/Globulin Ratio  0.6     BUN/Creatinine Ratio 17.0  18.6  17.3    Anion Gap 6.2  C 7.3  8.1       Details         C Corrected result

## 2023-06-10 NOTE — PLAN OF CARE
Goal Outcome Evaluation:  Patient is alert and oriented. Blood glucose monitored. No c/o pain currently. Wound care performed per orders. Encouraged patient to turn. No other changes at this time.

## 2023-06-11 LAB
GLUCOSE BLDC GLUCOMTR-MCNC: 154 MG/DL (ref 70–99)
GLUCOSE BLDC GLUCOMTR-MCNC: 163 MG/DL (ref 70–99)
GLUCOSE BLDC GLUCOMTR-MCNC: 184 MG/DL (ref 70–99)
GLUCOSE BLDC GLUCOMTR-MCNC: 194 MG/DL (ref 70–99)

## 2023-06-11 PROCEDURE — 63710000001 INSULIN LISPRO (HUMAN) PER 5 UNITS

## 2023-06-11 PROCEDURE — 82948 REAGENT STRIP/BLOOD GLUCOSE: CPT

## 2023-06-11 PROCEDURE — 97110 THERAPEUTIC EXERCISES: CPT

## 2023-06-11 PROCEDURE — 63710000001 INSULIN DETEMIR PER 5 UNITS: Performed by: INTERNAL MEDICINE

## 2023-06-11 PROCEDURE — 63710000001 INSULIN LISPRO (HUMAN) PER 5 UNITS: Performed by: INTERNAL MEDICINE

## 2023-06-11 PROCEDURE — 94799 UNLISTED PULMONARY SVC/PX: CPT

## 2023-06-11 PROCEDURE — 99232 SBSQ HOSP IP/OBS MODERATE 35: CPT | Performed by: INTERNAL MEDICINE

## 2023-06-11 RX ORDER — CALCIUM POLYCARBOPHIL 625 MG
625 TABLET ORAL DAILY
Status: DISCONTINUED | OUTPATIENT
Start: 2023-06-11 | End: 2023-06-15 | Stop reason: HOSPADM

## 2023-06-11 RX ORDER — DOCUSATE SODIUM 100 MG/1
100 CAPSULE, LIQUID FILLED ORAL 2 TIMES DAILY
Status: DISCONTINUED | OUTPATIENT
Start: 2023-06-11 | End: 2023-06-15 | Stop reason: HOSPADM

## 2023-06-11 RX ADMIN — AMOXICILLIN 1000 MG: 500 CAPSULE ORAL at 22:03

## 2023-06-11 RX ADMIN — DOCUSATE SODIUM 100 MG: 100 CAPSULE, LIQUID FILLED ORAL at 12:24

## 2023-06-11 RX ADMIN — INSULIN LISPRO 12 UNITS: 100 INJECTION, SOLUTION INTRAVENOUS; SUBCUTANEOUS at 17:23

## 2023-06-11 RX ADMIN — SIMETHICONE 80 MG: 80 TABLET, CHEWABLE ORAL at 06:56

## 2023-06-11 RX ADMIN — METOPROLOL SUCCINATE 37.5 MG: 25 TABLET, EXTENDED RELEASE ORAL at 08:21

## 2023-06-11 RX ADMIN — SIMETHICONE 80 MG: 80 TABLET, CHEWABLE ORAL at 12:24

## 2023-06-11 RX ADMIN — SIMETHICONE 80 MG: 80 TABLET, CHEWABLE ORAL at 17:23

## 2023-06-11 RX ADMIN — Medication 250 MG: at 22:03

## 2023-06-11 RX ADMIN — SIMETHICONE 80 MG: 80 TABLET, CHEWABLE ORAL at 22:03

## 2023-06-11 RX ADMIN — ATORVASTATIN CALCIUM 10 MG: 10 TABLET, FILM COATED ORAL at 22:02

## 2023-06-11 RX ADMIN — INSULIN LISPRO 12 UNITS: 100 INJECTION, SOLUTION INTRAVENOUS; SUBCUTANEOUS at 12:24

## 2023-06-11 RX ADMIN — INSULIN LISPRO 2 UNITS: 100 INJECTION, SOLUTION INTRAVENOUS; SUBCUTANEOUS at 08:20

## 2023-06-11 RX ADMIN — PREGABALIN 25 MG: 25 CAPSULE ORAL at 13:26

## 2023-06-11 RX ADMIN — DIGOXIN 125 MCG: 125 TABLET ORAL at 12:24

## 2023-06-11 RX ADMIN — Medication 10 ML: at 22:04

## 2023-06-11 RX ADMIN — Medication 250 MG: at 08:21

## 2023-06-11 RX ADMIN — PREGABALIN 25 MG: 25 CAPSULE ORAL at 06:56

## 2023-06-11 RX ADMIN — APIXABAN 5 MG: 5 TABLET, FILM COATED ORAL at 22:03

## 2023-06-11 RX ADMIN — Medication 10 ML: at 08:22

## 2023-06-11 RX ADMIN — AMOXICILLIN 1000 MG: 500 CAPSULE ORAL at 06:56

## 2023-06-11 RX ADMIN — INSULIN LISPRO 12 UNITS: 100 INJECTION, SOLUTION INTRAVENOUS; SUBCUTANEOUS at 08:21

## 2023-06-11 RX ADMIN — INSULIN LISPRO 2 UNITS: 100 INJECTION, SOLUTION INTRAVENOUS; SUBCUTANEOUS at 17:23

## 2023-06-11 RX ADMIN — AMOXICILLIN 1000 MG: 500 CAPSULE ORAL at 13:26

## 2023-06-11 RX ADMIN — FAMOTIDINE 40 MG: 20 TABLET ORAL at 08:21

## 2023-06-11 RX ADMIN — INSULIN DETEMIR 52 UNITS: 100 INJECTION, SOLUTION SUBCUTANEOUS at 22:03

## 2023-06-11 RX ADMIN — INSULIN LISPRO 2 UNITS: 100 INJECTION, SOLUTION INTRAVENOUS; SUBCUTANEOUS at 12:23

## 2023-06-11 RX ADMIN — PREGABALIN 25 MG: 25 CAPSULE ORAL at 22:03

## 2023-06-11 RX ADMIN — INSULIN LISPRO 2 UNITS: 100 INJECTION, SOLUTION INTRAVENOUS; SUBCUTANEOUS at 22:03

## 2023-06-11 RX ADMIN — INSULIN DETEMIR 52 UNITS: 100 INJECTION, SOLUTION SUBCUTANEOUS at 08:21

## 2023-06-11 RX ADMIN — APIXABAN 5 MG: 5 TABLET, FILM COATED ORAL at 08:21

## 2023-06-11 RX ADMIN — METOPROLOL SUCCINATE 37.5 MG: 25 TABLET, EXTENDED RELEASE ORAL at 22:03

## 2023-06-11 RX ADMIN — OXYCODONE HYDROCHLORIDE 10 MG: 5 TABLET ORAL at 22:11

## 2023-06-11 RX ADMIN — CALCIUM POLYCARBOPHIL 625 MG: 625 TABLET, FILM COATED ORAL at 13:26

## 2023-06-11 RX ADMIN — OXYCODONE HYDROCHLORIDE 10 MG: 5 TABLET ORAL at 04:33

## 2023-06-11 NOTE — THERAPY TREATMENT NOTE
Acute Care - Physical Therapy Treatment Note  KEO Dominguez     Patient Name: Jose Shaikh  : 1960  MRN: [id number]  Today's Date: 2023      Visit Dx:     ICD-10-CM ICD-9-CM   1. Acute febrile illness  R50.9 780.60   2. Sepsis without acute organ dysfunction, due to unspecified organism  A41.9 038.9     995.91   3. Pneumonia of right upper lobe due to infectious organism  J18.9 486   4. Uncontrolled type 2 diabetes mellitus with hyperglycemia  E11.65 250.02   5. Atrial fibrillation with rapid ventricular response  I48.91 427.31   6. Acute osteomyelitis of right foot  M86.171 730.07   7. Decreased activities of daily living (ADL)  Z78.9 V49.89   8. Difficulty in walking  R26.2 719.7     Patient Active Problem List   Diagnosis    Diabetic ulcer of left heel associated with type 2 DM    Acute osteomyelitis of left calcaneus     Diabetic ulcer of left heel associated with type 2 DM    Diabetic ulcer of right midfoot associated with type 2 DM    Paroxysmal atrial fibrillation    Essential hypertension    Hyperlipidemia LDL goal <100    Cellulitis and abscess of foot    High alkaline phosphatase level    Osteomyelitis    Onychomycosis    Onychocryptosis    Foot pain, bilateral    Osteomyelitis of foot, right, acute    Cellulitis of right foot    Type 2 diabetes mellitus, with long-term current use of insulin    Class 3 severe obesity due to excess calories with serious comorbidity and body mass index (BMI) of 45.0 to 49.9 in adult    Anxiety disorder, unspecified    Claustrophobia    Dependence on wheelchair    Depression, unspecified    Long term (current) use of anticoagulants    Long term (current) use of oral hypoglycemic drugs    Wound of foot    Non-prs chronic ulcer oth prt r foot limited to brkdwn skin    Orthostatic hypotension    Other chronic osteomyelitis, right ankle and foot    Personal history of nicotine dependence    Thrombocytopenia, unspecified    Unspecified open wound, right foot, initial  encounter    Diabetic foot infection    Subacute osteomyelitis of right foot    Right foot pain    Sepsis    Onychomycosis    Foot pain, left     Past Medical History:   Diagnosis Date    Absence of toe of right foot     Acute osteomyelitis of left calcaneus  8/18/2021    Anxiety and depression     Arthritis     Claustrophobia     Corns and callus     Diabetic ulcer of left heel associated with type 2 DM 8/18/2021    Diabetic ulcer of left heel associated with type 2 DM 7/6/2021    Diabetic ulcer of right midfoot associated with type 2 DM 8/18/2021    Difficulty walking     Essential hypertension 8/31/2021    Hammertoe     Hyperlipidemia LDL goal <100 8/31/2021    Ingrown toenail     Obesity     Paroxysmal atrial fibrillation 8/31/2021    Polyneuropathy     Pressure ulcer, stage 1     Tinea unguium     Type 2 diabetes mellitus with polyneuropathy      Past Surgical History:   Procedure Laterality Date    CYST REMOVAL      center of back; benign    INCISION AND DRAINAGE ABSCESS      back    INCISION AND DRAINAGE LEG Right 12/10/2021    Procedure: INCISION AND DRAINAGE LOWER EXTREMITY;  Surgeon: Ash Leyva DPM;  Location: [facility];  Service: Podiatry;  Laterality: Right;    OTHER SURGICAL HISTORY      Surgical clips left foot    TOE SURGERY Right     Removal of 5th toe    TRANS METATARSAL AMPUTATION Right 12/2/2021    Procedure: AMPUTATION TRANS METATARSAL;  Surgeon: Ash Leyva DPM;  Location: [facility];  Service: Podiatry;  Laterality: Right;    WRIST SURGERY Left     repair of injury     PT Assessment (last 12 hours)       PT Evaluation and Treatment       Row Name 06/11/23 0930          Physical Therapy Time and Intention    Subjective Information complains of;weakness;fatigue;pain  -DK     Document Type therapy note (daily note)  -DK     Mode of Treatment individual therapy;physical therapy  -DK     Patient Effort good  -DK     Symptoms Noted During/After Treatment  fatigue;increased pain  -DK     Comment Pt was c/o hemorroid pain.  -       Row Name 06/11/23 0939          Pain    Pretreatment Pain Rating 7/10  -DK     Posttreatment Pain Rating 7/10  -DK     Pain Location generalized  -DK     Pain Location - --  hemorroids  -     Pain Intervention(s) Repositioned;Ambulation/increased activity;Distraction;Therapeutic presence  -       Row Name 06/11/23 0939          Cognition    Affect/Mental Status (Cognition) WFL  -DK     Orientation Status (Cognition) oriented x 4  -DK     Follows Commands (Cognition) WFL  -DK     Cognitive Function WFL  -DK     Personal Safety Interventions gait belt;nonskid shoes/slippers when out of bed;supervised activity  -       Row Name 06/11/23 0939          Motor Skills    Motor Skills --  therapeutic exercises  -DK     Coordination WFL  -     Therapeutic Exercise hip;knee;ankle  -     Additional Documentation --  Pt declined transfers due to c/o hemorroid pain.  -       Row Name 06/11/23 0939          Hip (Therapeutic Exercise)    Hip (Therapeutic Exercise) AAROM (active assistive range of motion)  -     Hip AAROM (Therapeutic Exercise) bilateral;flexion;extension;aBduction;aDduction;supine;10 repetitions;2 sets  -       Row Name 06/11/23 0939          Knee (Therapeutic Exercise)    Knee (Therapeutic Exercise) AAROM (active assistive range of motion)  -     Knee AAROM (Therapeutic Exercise) bilateral;flexion;extension;supine;10 repetitions;2 sets  -       Row Name 06/11/23 0939          Ankle (Therapeutic Exercise)    Ankle (Therapeutic Exercise) AROM (active range of motion)  -     Ankle AROM (Therapeutic Exercise) bilateral;dorsiflexion;plantarflexion;supine;20 repititions  -DK       Row Name             Wound 12/02/21 Right anterior foot Incision    Wound - Properties Group Placement Date: 12/02/21  -ES Side: Right  -ES Orientation: anterior  -ES Location: foot  -ES Primary Wound Type: Incision  -ES    Retired Wound -  Properties Group Placement Date: 12/02/21  -ES Side: Right  -ES Orientation: anterior  -ES Location: foot  -ES Primary Wound Type: Incision  -ES    Retired Wound - Properties Group Date first assessed: 12/02/21  -ES Side: Right  -ES Location: foot  -ES Primary Wound Type: Incision  -ES      Row Name             Wound 06/22/21 1133 Left lateral heel    Wound - Properties Group Placement Date: 06/22/21  -FABIOLA Placement Time: 1133  -FABIOLA Present on Hospital Admission: Y  -FABIOLA Side: Left  -FABIOLA Orientation: lateral  -FABIOLA Location: heel  -FABIOLA    Retired Wound - Properties Group Placement Date: 06/22/21  -FABIOLA Placement Time: 1133  -FABIOLA Present on Hospital Admission: Y  -FABIOLA Side: Left  -FABIOLA Orientation: lateral  -FABIOLA Location: heel  -FABIOLA    Retired Wound - Properties Group Date first assessed: 06/22/21  -FABIOLA Time first assessed: 1133  -FABIOLA Present on Hospital Admission: Y  -FABIOLA Side: Left  -FABIOLA Location: heel  -FABIOLA      Row Name             Wound 05/19/23 1317 gluteal Blisters    Wound - Properties Group Placement Date: 05/19/23  -RASHARD Placement Time: 1317  -RASHARD Present on Hospital Admission: N  -RASHARD Location: gluteal  -RASHARD Primary Wound Type: Blisters  -RASHARD    Retired Wound - Properties Group Placement Date: 05/19/23  -RASHARD Placement Time: 1317  -RASHARD Present on Hospital Admission: N  -RASHARD Location: gluteal  -RASHARD Primary Wound Type: Blisters  -RASHARD    Retired Wound - Properties Group Date first assessed: 05/19/23  -RASHARD Time first assessed: 1317  -RASHARD Present on Hospital Admission: N  -RASHARD Location: gluteal  -RASHARD Primary Wound Type: Blisters  -RASHARD      Row Name 06/11/23 0939          Plan of Care Review    Plan of Care Reviewed With patient  -DK     Progress no change  -DK       Row Name 06/11/23 0939          Positioning and Restraints    Pre-Treatment Position in bed  -DK     Post Treatment Position bed  -DK     In Bed supine;call light within reach;encouraged to call for assist;exit alarm on;legs elevated;heels elevated  side rails up x 4  -DK       Row  Name 06/11/23 0939          Therapy Assessment/Plan (PT)    Rehab Potential (PT) fair, will monitor progress closely  -DK     Criteria for Skilled Interventions Met (PT) skilled treatment is necessary  -DK     Therapy Frequency (PT) daily  -DK     Problem List (PT) problems related to;balance;mobility;range of motion (ROM);strength;pain  -DK     Activity Limitations Related to Problem List (PT) unable to ambulate safely;unable to transfer safely  -DK       Row Name 06/11/23 0939          Progress Summary (PT)    Progress Toward Functional Goals (PT) progress toward functional goals is fair  -DK               User Key  (r) = Recorded By, (t) = Taken By, (c) = Cosigned By      Initials Name Provider Type    Karon Jordan, RN Registered Nurse    Mckenna Landaverde PTA Physical Therapist Assistant    Marlen Vaughn RN Registered Nurse    Katlyn Garcia RN Registered Nurse                      PT Recommendation and Plan  Planned Therapy Interventions (PT): balance training, bed mobility training, gait training, home exercise program, strengthening, transfer training  Therapy Frequency (PT): daily  Progress Summary (PT)  Progress Toward Functional Goals (PT): progress toward functional goals is fair  Plan of Care Reviewed With: patient  Progress: no change   Outcome Measures       Row Name 06/11/23 0900 06/09/23 1500          How much help from another person do you currently need...    Turning from your back to your side while in flat bed without using bedrails? 3  -DK 3  -MOE (r) MB (t) MOE (c)     Moving from lying on back to sitting on the side of a flat bed without bedrails? 2  -DK 2  -MOE (r) MB (t) MOE (c)     Moving to and from a bed to a chair (including a wheelchair)? 2  -DK 2  -MOE (r) MB (t) MOE (c)     Standing up from a chair using your arms (e.g., wheelchair, bedside chair)? 2  -DK 2  -MOE (r) MB (t) MOE (c)     Climbing 3-5 steps with a railing? 1  -DK 1  -MOE (r) MB (t) MOE (c)     To walk in  hospital room? 1  -DK 2  -MOE (r) MB (t) MOE (c)     AM-PAC 6 Clicks Score (PT) 11  -DK 12  -MOE (r) MB (t)        Functional Assessment    Outcome Measure Options AM-PAC 6 Clicks Basic Mobility (PT)  -DK AM-PAC 6 Clicks Basic Mobility (PT)  -MOE (r) MB (t) MOE (c)               User Key  (r) = Recorded By, (t) = Taken By, (c) = Cosigned By      Initials Name Provider Type    Mckenna Landaverde PTA Physical Therapist Assistant    Merlin De La O, PT Physical Therapist    Gilbert Bennett, PT Student PT Student                     Time Calculation:    PT Charges       Row Name 06/11/23 0942             Time Calculation    PT Received On 06/11/23  -DK      PT Goal Re-Cert Due Date 06/14/23  -DK         Timed Charges    66705 - PT Therapeutic Exercise Minutes 14  -DK      40116 - PT Therapeutic Activity Minutes 3  -DK         Total Minutes    Timed Charges Total Minutes 17  -DK       Total Minutes 17  -DK                User Key  (r) = Recorded By, (t) = Taken By, (c) = Cosigned By      Initials Name Provider Type    Mckenna Landaverde PTA Physical Therapist Assistant                  Therapy Charges for Today       Code Description Service Date Service Provider Modifiers Qty    55145899081 HC PT THER PROC EA 15 MIN 6/11/2023 Mckenna Wong PTA GP 1            PT G-Codes  Outcome Measure Options: AM-PAC 6 Clicks Basic Mobility (PT)  AM-PAC 6 Clicks Score (PT): 11  AM-PAC 6 Clicks Score (OT): 17    Mckenna Wong PTA  6/11/2023

## 2023-06-11 NOTE — PROGRESS NOTES
[facility]   Hospitalist Progress Note    Date of admission: 5/12/2023  Patient Name: Jose Shaikh  1960  Date: 6/11/2023      Subjective     Chief Complaint   Patient presents with    Hip Pain    Shoulder Pain       Summary: 62 y.o. male with hx of osteomyelitis (recent discharge for similar), dm2 and recurrent/chronic foot infections, afib, morbid obesity who presented with worsening hip pain.         Patient was recently admitted to the hospital in early April for foot infection.  There was recommendation for amputation due to osteomyelitis in the foot and the patient refused.  He was unable to be placed at that time.  He is a sex offender making it very difficult for placement.  Pt treated with oral abx with doxy/cipro for Alcaligenes facialis and Morganella morganii last admission. He follows with wound care/Dr Daniels and had attempted to switch antibiotics to Levaquin per pharmacy recommendations based off culture data but was unable to get hold the patient so he was never switched.      Treating for right foot osteomyelitis and bacteremia with 6 week course with amoxicillin per ID.  Debilitated and working on rehab placement.  For hip pain - X-ray of pelvis and hip shows DJD of bilateral hips left worse than right.  Patient refused MRI of the left hip and order was canceled as patient is very claustrophobic.      Interval Followup: no aeon. Notes some discomfort from hemorrhoids. Declines annusol/suppository currently but is interested in using fiber. No other ac compl's    Objective     Vitals:   Temp:  [97.7 °F (36.5 °C)-99.1 °F (37.3 °C)] 97.9 °F (36.6 °C)  Heart Rate:  [74-84] 78  Resp:  [18-20] 20  BP: (108-148)/(63-86) 148/72    Physical Exam  Resting comfortably in bed   Nonlabored breathing on room air   rrr  B/l foot wounds dressed, no acute drainage, no proximal erythema noted    Result Review:  Vital signs, labs and recent relevant imaging reviewed.      acetaminophen, 1,000 mg, Oral,  Q8H  amoxicillin, 1,000 mg, Oral, Q8H  apixaban, 5 mg, Oral, Q12H  atorvastatin, 10 mg, Oral, Nightly  digoxin, 125 mcg, Oral, Daily  docusate sodium, 100 mg, Oral, BID  famotidine, 40 mg, Oral, Daily  insulin detemir, 52 Units, Subcutaneous, Q12H  insulin lispro, 12 Units, Subcutaneous, TID With Meals  insulin lispro, 2-7 Units, Subcutaneous, 4x Daily With Meals & Nightly  metoprolol succinate XL, 37.5 mg, Oral, Q12H  polycarbophil, 625 mg, Oral, Daily  pregabalin, 25 mg, Oral, Q8H  saccharomyces boulardii, 250 mg, Oral, BID  simethicone, 80 mg, Oral, 4x Daily AC & at Bedtime  sodium chloride, 10 mL, Intravenous, Q12H          aluminum-magnesium hydroxide-simethicone    [DISCONTINUED] senna-docusate sodium **AND** polyethylene glycol **AND** bisacodyl **AND** bisacodyl    calcium carbonate    cyclobenzaprine    dextrose    dextrose    glucagon (human recombinant)    loperamide    melatonin    metoprolol tartrate    [] Morphine **AND** naloxone    ondansetron    oxyCODONE    phenylephrine-mineral oil-petrolatum    [COMPLETED] Insert peripheral IV **AND** sodium chloride    sodium chloride    sodium chloride      Assessment / Plan     Assessment/Plan:  Sepsis poa 2/2 pna, bacteremia  RUL healthcare associated pneumonia  Streptococcus agalactiae bacteremia  Chronic osteomyelitis of right foot involving cuboid and maybe cuneiform.  On p.o. amoxicillin for 6 weeks  Cellulitis of right foot due to Streptococcus agalactiae.  Paroxysmal A-fib with intermittent RVR on Eliquis.  Heart rates have been well controlled  Bilateral diabetic foot ulcers.  NIDDM.  Most recent A1c 6.6 in 2023  Morbid obesity BMI of 44.8, down from 48.1 on admission  S/p TMA of right foot.  Hyponatremia.  likely from hyperglycemia/volume overload.  Clinically significant.  Resolved.  Hypophosphatemia.  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  Chronic shoulder neck pain  Hypertension.  Blood pressure stable.  Diarrhea  most likely antibiotic associated  Hemorrhoids, nonbleeding  Prolonged hospitalization, admitted 5/12/23    Add fiber and docusate per patient preference for hemorrhoid discomfort, monitor; declines annusol or suppository/cream for now  Monitor bowel regimen  Prn pain medications  HR reasonable on current regimen, cont digoxin, metop succinate, BP stable off of midodrine - may be able to uptitrate metoprolol some.    Continue apixaban for pafib  Cont po amoxicillin for osteomyelitis until 6/23, florastor   Levemir, ssi, lyrica, monitor blood glucose, titrate further today for higher blood glucose   Cont famotidine  C diff negative, prn loperamide if needed, discontinued scheduled bowel reg  Continue hospital monitoring and treatment at current level of care - does not need upgraded at this time.  Discuss with kalen, rn     Rehab placement difficulties, cont tx and monitoring as above. Too weak/debilitated to safely go home.       DVT prophylaxis:  Medical DVT prophylaxis orders are present.    Level Of Support Discussed With: Patient  Code Status (Patient has no pulse and is not breathing): CPR (Attempt to Resuscitate)  Medical Interventions (Patient has pulse or is breathing): Full Support        CBC          6/1/2023    06:29 6/6/2023    04:52 6/8/2023    05:20   CBC   WBC 4.20  3.62  4.01    RBC 4.37  4.61  4.50    Hemoglobin 10.6  11.3  10.7    Hematocrit 35.0  36.7  36.0    MCV 80.1  79.6  80.0    MCH 24.3  24.5  23.8    MCHC 30.3  30.8  29.7    RDW 16.9  16.5  16.4    Platelets 153  152  152      CMP          6/1/2023    06:29 6/6/2023    04:52 6/8/2023    05:20   CMP   Glucose 203  217  215    BUN 9  11  9    Creatinine 0.53  0.59  0.52    EGFR 111.9  108.3  112.6    Sodium 136  C 138  136    Potassium 4.5  C 3.8  4.2    Chloride 100  C 101  102    Calcium 8.4  8.8  8.4    Total Protein  6.9     Albumin  2.7     Globulin  4.2     Total Bilirubin  0.6     Alkaline Phosphatase  321     AST (SGOT)  32     ALT  (SGPT)  18     Albumin/Globulin Ratio  0.6     BUN/Creatinine Ratio 17.0  18.6  17.3    Anion Gap 6.2  C 7.3  8.1       Details         C Corrected result

## 2023-06-11 NOTE — PLAN OF CARE
Goal Outcome Evaluation:  Plan of Care Reviewed With: patient     Patient is alert and oriented this shift. Blood glucose monitored. Educated patient on importance of frequently turning in bed to prevent further skin breakdown. Wound care performed per orders. No other changes at this time.

## 2023-06-12 LAB
GLUCOSE BLDC GLUCOMTR-MCNC: 126 MG/DL (ref 70–99)
GLUCOSE BLDC GLUCOMTR-MCNC: 164 MG/DL (ref 70–99)
GLUCOSE BLDC GLUCOMTR-MCNC: 172 MG/DL (ref 70–99)
GLUCOSE BLDC GLUCOMTR-MCNC: 216 MG/DL (ref 70–99)

## 2023-06-12 PROCEDURE — 63710000001 INSULIN LISPRO (HUMAN) PER 5 UNITS: Performed by: INTERNAL MEDICINE

## 2023-06-12 PROCEDURE — 63710000001 INSULIN LISPRO (HUMAN) PER 5 UNITS

## 2023-06-12 PROCEDURE — 99232 SBSQ HOSP IP/OBS MODERATE 35: CPT | Performed by: INTERNAL MEDICINE

## 2023-06-12 PROCEDURE — 63710000001 INSULIN DETEMIR PER 5 UNITS: Performed by: INTERNAL MEDICINE

## 2023-06-12 PROCEDURE — 97110 THERAPEUTIC EXERCISES: CPT

## 2023-06-12 PROCEDURE — 82948 REAGENT STRIP/BLOOD GLUCOSE: CPT

## 2023-06-12 PROCEDURE — 94799 UNLISTED PULMONARY SVC/PX: CPT

## 2023-06-12 RX ORDER — ACETAMINOPHEN 325 MG/1
650 TABLET ORAL EVERY 6 HOURS PRN
Status: DISCONTINUED | OUTPATIENT
Start: 2023-06-12 | End: 2023-06-15 | Stop reason: HOSPADM

## 2023-06-12 RX ADMIN — APIXABAN 5 MG: 5 TABLET, FILM COATED ORAL at 09:12

## 2023-06-12 RX ADMIN — SIMETHICONE 80 MG: 80 TABLET, CHEWABLE ORAL at 21:10

## 2023-06-12 RX ADMIN — SIMETHICONE 80 MG: 80 TABLET, CHEWABLE ORAL at 17:29

## 2023-06-12 RX ADMIN — AMOXICILLIN 1000 MG: 500 CAPSULE ORAL at 06:45

## 2023-06-12 RX ADMIN — METOPROLOL SUCCINATE 37.5 MG: 25 TABLET, EXTENDED RELEASE ORAL at 21:11

## 2023-06-12 RX ADMIN — FAMOTIDINE 40 MG: 20 TABLET ORAL at 09:12

## 2023-06-12 RX ADMIN — DOCUSATE SODIUM 100 MG: 100 CAPSULE, LIQUID FILLED ORAL at 09:12

## 2023-06-12 RX ADMIN — INSULIN DETEMIR 52 UNITS: 100 INJECTION, SOLUTION SUBCUTANEOUS at 21:10

## 2023-06-12 RX ADMIN — INSULIN LISPRO 2 UNITS: 100 INJECTION, SOLUTION INTRAVENOUS; SUBCUTANEOUS at 17:29

## 2023-06-12 RX ADMIN — PREGABALIN 25 MG: 25 CAPSULE ORAL at 06:45

## 2023-06-12 RX ADMIN — SIMETHICONE 80 MG: 80 TABLET, CHEWABLE ORAL at 06:45

## 2023-06-12 RX ADMIN — AMOXICILLIN 1000 MG: 500 CAPSULE ORAL at 12:47

## 2023-06-12 RX ADMIN — PREGABALIN 25 MG: 25 CAPSULE ORAL at 21:11

## 2023-06-12 RX ADMIN — INSULIN LISPRO 12 UNITS: 100 INJECTION, SOLUTION INTRAVENOUS; SUBCUTANEOUS at 09:12

## 2023-06-12 RX ADMIN — OXYCODONE HYDROCHLORIDE 10 MG: 5 TABLET ORAL at 21:11

## 2023-06-12 RX ADMIN — OXYCODONE HYDROCHLORIDE 10 MG: 5 TABLET ORAL at 09:12

## 2023-06-12 RX ADMIN — Medication 250 MG: at 09:12

## 2023-06-12 RX ADMIN — Medication 10 ML: at 09:13

## 2023-06-12 RX ADMIN — APIXABAN 5 MG: 5 TABLET, FILM COATED ORAL at 21:11

## 2023-06-12 RX ADMIN — INSULIN DETEMIR 52 UNITS: 100 INJECTION, SOLUTION SUBCUTANEOUS at 09:12

## 2023-06-12 RX ADMIN — CYCLOBENZAPRINE 10 MG: 10 TABLET, FILM COATED ORAL at 04:35

## 2023-06-12 RX ADMIN — INSULIN LISPRO 3 UNITS: 100 INJECTION, SOLUTION INTRAVENOUS; SUBCUTANEOUS at 21:10

## 2023-06-12 RX ADMIN — ATORVASTATIN CALCIUM 10 MG: 10 TABLET, FILM COATED ORAL at 21:10

## 2023-06-12 RX ADMIN — AMOXICILLIN 1000 MG: 500 CAPSULE ORAL at 21:10

## 2023-06-12 RX ADMIN — SIMETHICONE 80 MG: 80 TABLET, CHEWABLE ORAL at 12:47

## 2023-06-12 RX ADMIN — DIGOXIN 125 MCG: 125 TABLET ORAL at 12:47

## 2023-06-12 RX ADMIN — Medication 250 MG: at 21:11

## 2023-06-12 RX ADMIN — INSULIN LISPRO 2 UNITS: 100 INJECTION, SOLUTION INTRAVENOUS; SUBCUTANEOUS at 12:46

## 2023-06-12 RX ADMIN — PREGABALIN 25 MG: 25 CAPSULE ORAL at 12:47

## 2023-06-12 RX ADMIN — Medication 10 MG: at 21:11

## 2023-06-12 RX ADMIN — INSULIN LISPRO 12 UNITS: 100 INJECTION, SOLUTION INTRAVENOUS; SUBCUTANEOUS at 17:29

## 2023-06-12 RX ADMIN — CALCIUM POLYCARBOPHIL 625 MG: 625 TABLET, FILM COATED ORAL at 11:21

## 2023-06-12 RX ADMIN — METOPROLOL SUCCINATE 37.5 MG: 25 TABLET, EXTENDED RELEASE ORAL at 09:12

## 2023-06-12 RX ADMIN — INSULIN LISPRO 12 UNITS: 100 INJECTION, SOLUTION INTRAVENOUS; SUBCUTANEOUS at 12:46

## 2023-06-12 NOTE — THERAPY TREATMENT NOTE
Acute Care - Physical Therapy Treatment Note  KEO Dominguez     Patient Name: Jose Shaikh  : 1960  MRN: [id number]  Today's Date: 2023      Visit Dx:     ICD-10-CM ICD-9-CM   1. Acute febrile illness  R50.9 780.60   2. Sepsis without acute organ dysfunction, due to unspecified organism  A41.9 038.9     995.91   3. Pneumonia of right upper lobe due to infectious organism  J18.9 486   4. Uncontrolled type 2 diabetes mellitus with hyperglycemia  E11.65 250.02   5. Atrial fibrillation with rapid ventricular response  I48.91 427.31   6. Acute osteomyelitis of right foot  M86.171 730.07   7. Decreased activities of daily living (ADL)  Z78.9 V49.89   8. Difficulty in walking  R26.2 719.7     Patient Active Problem List   Diagnosis    Diabetic ulcer of left heel associated with type 2 DM    Acute osteomyelitis of left calcaneus     Diabetic ulcer of left heel associated with type 2 DM    Diabetic ulcer of right midfoot associated with type 2 DM    Paroxysmal atrial fibrillation    Essential hypertension    Hyperlipidemia LDL goal <100    Cellulitis and abscess of foot    High alkaline phosphatase level    Osteomyelitis    Onychomycosis    Onychocryptosis    Foot pain, bilateral    Osteomyelitis of foot, right, acute    Cellulitis of right foot    Type 2 diabetes mellitus, with long-term current use of insulin    Class 3 severe obesity due to excess calories with serious comorbidity and body mass index (BMI) of 45.0 to 49.9 in adult    Anxiety disorder, unspecified    Claustrophobia    Dependence on wheelchair    Depression, unspecified    Long term (current) use of anticoagulants    Long term (current) use of oral hypoglycemic drugs    Wound of foot    Non-prs chronic ulcer oth prt r foot limited to brkdwn skin    Orthostatic hypotension    Other chronic osteomyelitis, right ankle and foot    Personal history of nicotine dependence    Thrombocytopenia, unspecified    Unspecified open wound, right foot, initial  encounter    Diabetic foot infection    Subacute osteomyelitis of right foot    Right foot pain    Sepsis    Onychomycosis    Foot pain, left     Past Medical History:   Diagnosis Date    Absence of toe of right foot     Acute osteomyelitis of left calcaneus  8/18/2021    Anxiety and depression     Arthritis     Claustrophobia     Corns and callus     Diabetic ulcer of left heel associated with type 2 DM 8/18/2021    Diabetic ulcer of left heel associated with type 2 DM 7/6/2021    Diabetic ulcer of right midfoot associated with type 2 DM 8/18/2021    Difficulty walking     Essential hypertension 8/31/2021    Hammertoe     Hyperlipidemia LDL goal <100 8/31/2021    Ingrown toenail     Obesity     Paroxysmal atrial fibrillation 8/31/2021    Polyneuropathy     Pressure ulcer, stage 1     Tinea unguium     Type 2 diabetes mellitus with polyneuropathy      Past Surgical History:   Procedure Laterality Date    CYST REMOVAL      center of back; benign    INCISION AND DRAINAGE ABSCESS      back    INCISION AND DRAINAGE LEG Right 12/10/2021    Procedure: INCISION AND DRAINAGE LOWER EXTREMITY;  Surgeon: Ash Leyva DPM;  Location: [facility] (formerly [facility]);  Service: Podiatry;  Laterality: Right;    OTHER SURGICAL HISTORY      Surgical clips left foot    TOE SURGERY Right     Removal of 5th toe    TRANS METATARSAL AMPUTATION Right 12/2/2021    Procedure: AMPUTATION TRANS METATARSAL;  Surgeon: Ash Leyva DPM;  Location: [facility] OR;  Service: Podiatry;  Laterality: Right;    WRIST SURGERY Left     repair of injury     PT Assessment (last 12 hours)       PT Evaluation and Treatment       Row Name 06/12/23 1100          Physical Therapy Time and Intention    Subjective Information no complaints (P)   -MB     Document Type therapy note (daily note) (P)   -MB     Mode of Treatment individual therapy (P)   -MB     Patient Effort good (P)   -MB     Symptoms Noted During/After Treatment none (P)   -MB       Row Name  06/12/23 1100          Bed Mobility    Bed Mobility sit-supine;supine-sit;scooting/bridging (P)   -MB     Scooting/Bridging [place] (Bed Mobility) minimum assist (75% patient effort) (P)   -MB     Supine-Sit [place] (Bed Mobility) minimum assist (75% patient effort) (P)   -MB     Sit-Supine [place] (Bed Mobility) minimum assist (75% patient effort) (P)   -MB     Assistive Device (Bed Mobility) bed rails (P)   -MB       Row Name 06/12/23 1100          Balance    Balance Assessment sitting dynamic balance (P)   -MB     Dynamic Sitting Balance standby assist (P)   -MB       Row Name 06/12/23 1100          Motor Skills    Therapeutic Exercise other (see comments) (P)   Pt performed exercises in sitting and supine x15 ea including ankle pumps, LAQ, Hip IR/ER, Hip ab/ad, and SLR. Pt required AA for LLE during SLR  -MB       Row Name             Wound 12/02/21 Right anterior foot Incision    Wound - Properties Group Placement Date: 12/02/21  -ES Side: Right  -ES Orientation: anterior  -ES Location: foot  -ES Primary Wound Type: Incision  -ES    Retired Wound - Properties Group Placement Date: 12/02/21  -ES Side: Right  -ES Orientation: anterior  -ES Location: foot  -ES Primary Wound Type: Incision  -ES    Retired Wound - Properties Group Date first assessed: 12/02/21  -ES Side: Right  -ES Location: foot  -ES Primary Wound Type: Incision  -ES      Row Name             Wound 06/22/21 1133 Left lateral heel    Wound - Properties Group Placement Date: 06/22/21  -FABIOLA Placement Time: 1133  -FABIOLA Present on Hospital Admission: Y  -FABIOLA Side: Left  -FABIOLA Orientation: lateral  -FABIOLA Location: heel  -FABIOLA    Retired Wound - Properties Group Placement Date: 06/22/21  -FABIOLA Placement Time: 1133  -FABIOLA Present on Hospital Admission: Y  -FABIOLA Side: Left  -FABIOLA Orientation: lateral  -FABIOLA Location: heel  -FABIOLA    Retired Wound - Properties Group Date first assessed: 06/22/21  -FABIOLA Time first assessed: 1133  -FABIOLA Present on Hospital Admission: Y   -FABIOLA Side: Left  -FABIOLA Location: heel  -FABIOLA      Row Name             Wound 05/19/23 1317 gluteal Blisters    Wound - Properties Group Placement Date: 05/19/23  -RASHARD Placement Time: 1317  -RASHARD Present on Hospital Admission: N  -RASHARD Location: gluteal  -RASHARD Primary Wound Type: Blisters  -RASHARD    Retired Wound - Properties Group Placement Date: 05/19/23  -RASHARD Placement Time: 1317  -RASHARD Present on Hospital Admission: N  -RASHARD Location: gluteal  -RASHARD Primary Wound Type: Blisters  -RASHARD    Retired Wound - Properties Group Date first assessed: 05/19/23  -RASHARD Time first assessed: 1317  -RASHARD Present on Hospital Admission: N  -RASHARD Location: gluteal  -RASHARD Primary Wound Type: Blisters  -RASHARD      Row Name 06/12/23 1100          Plan of Care Review    Plan of Care Reviewed With patient (P)   -MB     Progress improving (P)   -MB     Outcome Evaluation Pt tolerated treatment well today. He demonstrated good effort and is making progress toward meeting his goals. He will continue to benefit from skilled PT intervention. (P)   -MB       Row Name 06/12/23 1100          Positioning and Restraints    Pre-Treatment Position in bed (P)   -MB     Post Treatment Position bed (P)   -MB     In Bed supine (P)   -MB               User Key  (r) = Recorded By, (t) = Taken By, (c) = Cosigned By      Initials Name Provider Type    Karon Jordan, RN Registered Nurse    Marlen Vaughn, RN Registered Nurse    Katlyn Garcia RN Registered Nurse    Gilbert Bennett, PT Student PT Student                      PT Recommendation and Plan     Plan of Care Reviewed With: (P) patient  Progress: (P) improving  Outcome Evaluation: (P) Pt tolerated treatment well today. He demonstrated good effort and is making progress toward meeting his goals. He will continue to benefit from skilled PT intervention.   Outcome Measures       Row Name 06/12/23 1100 06/11/23 0900 06/09/23 1500       How much help from another person do you currently need...    Turning from your back to  your side while in flat bed without using bedrails? 3 (P)   -MB 3  -DK 3  -MOE (r) MB (t) MOE (c)    Moving from lying on back to sitting on the side of a flat bed without bedrails? 3 (P)   -MB 2  -DK 2  -MOE (r) MB (t) MOE (c)    Moving to and from a bed to a chair (including a wheelchair)? 2 (P)   -MB 2  -DK 2  -MOE (r) MB (t) MOE (c)    Standing up from a chair using your arms (e.g., wheelchair, bedside chair)? 2 (P)   -MB 2  -DK 2  -MOE (r) MB (t) MOE (c)    Climbing 3-5 steps with a railing? 1 (P)   -MB 1  -DK 1  -MOE (r) MB (t) MOE (c)    To walk in hospital room? 1 (P)   -MB 1  -DK 2  -MOE (r) MB (t) MOE (c)    AM-PAC 6 Clicks Score (PT) 12 (P)   -MB 11  -DK 12  -MOE (r) MB (t)       Functional Assessment    Outcome Measure Options AM-PAC 6 Clicks Basic Mobility (PT) (P)   -MB AM-PAC 6 Clicks Basic Mobility (PT)  -DK AM-PAC 6 Clicks Basic Mobility (PT)  -MOE (r) MB (t) MOE (c)              User Key  (r) = Recorded By, (t) = Taken By, (c) = Cosigned By      Initials Name Provider Type    Mckenna Landaverde, PTA Physical Therapist Assistant    Merlin De La O, PT Physical Therapist    Gilbert Bennett, PT Student PT Student                     Time Calculation:    PT Charges       Row Name 06/12/23 1147             Time Calculation    PT Received On 06/12/23 (P)   -MB         Timed Charges    48112 - PT Therapeutic Exercise Minutes 11 (P)   -MB         Total Minutes    Timed Charges Total Minutes 11 (P)   -MB       Total Minutes 11 (P)   -MB                User Key  (r) = Recorded By, (t) = Taken By, (c) = Cosigned By      Initials Name Provider Type    Gilbert Bennett, PT Student PT Student                      PT G-Codes  Outcome Measure Options: (P) AM-PAC 6 Clicks Basic Mobility (PT)  AM-PAC 6 Clicks Score (PT): (P) 12  AM-PAC 6 Clicks Score (OT): 17    Gilbert Seymour, PT Student  6/12/2023

## 2023-06-12 NOTE — PROGRESS NOTES
[facility]   Hospitalist Progress Note    Date of admission: 5/12/2023  Patient Name: Jose Shaikh  1960  Date: 6/12/2023      Subjective     Chief Complaint   Patient presents with    Hip Pain    Shoulder Pain       Summary: 62 y.o. male with hx of osteomyelitis (recent discharge for similar), dm2 and recurrent/chronic foot infections, afib, morbid obesity who presented with worsening hip pain.         Patient was recently admitted to the hospital in early April for foot infection.  There was recommendation for amputation due to osteomyelitis in the foot and the patient refused.  He was unable to be placed at that time.  He is a sex offender making it very difficult for placement.  Pt treated with oral abx with doxy/cipro for Alcaligenes facialis and Morganella morganii last admission. He follows with wound care/Dr Daniels and had attempted to switch antibiotics to Levaquin per pharmacy recommendations based off culture data but was unable to get hold the patient so he was never switched.      Treating for right foot osteomyelitis and bacteremia with 6 week course with amoxicillin per ID.  Debilitated and working on rehab placement.  For hip pain - X-ray of pelvis and hip shows DJD of bilateral hips left worse than right.  Patient refused MRI of the left hip and order was canceled as patient is very claustrophobic.  Symptoms have stabilized and he is working intermittently with PT    Pt is stable awaiting rehab placement but having pm issues 2/2 his social hx    Interval Followup: no aeon. Worked with pt today and did better      Objective     Vitals:   Temp:  [97.3 °F (36.3 °C)-98.8 °F (37.1 °C)] 98.6 °F (37 °C)  Heart Rate:  [72-81] 72  Resp:  [18-20] 18  BP: (105-136)/(60-78) 113/72    Physical Exam  Awake Resting comfortably in bed   Nonlabored breathing on room air   rrr  B/l foot wounds dressed, no acute drainage, no proximal erythema noted    Result Review:  Vital signs, labs and recent relevant  imaging reviewed.      amoxicillin, 1,000 mg, Oral, Q8H  apixaban, 5 mg, Oral, Q12H  atorvastatin, 10 mg, Oral, Nightly  digoxin, 125 mcg, Oral, Daily  docusate sodium, 100 mg, Oral, BID  famotidine, 40 mg, Oral, Daily  insulin detemir, 52 Units, Subcutaneous, Q12H  insulin lispro, 12 Units, Subcutaneous, TID With Meals  insulin lispro, 2-7 Units, Subcutaneous, 4x Daily With Meals & Nightly  metoprolol succinate XL, 37.5 mg, Oral, Q12H  polycarbophil, 625 mg, Oral, Daily  pregabalin, 25 mg, Oral, Q8H  saccharomyces boulardii, 250 mg, Oral, BID  simethicone, 80 mg, Oral, 4x Daily AC & at Bedtime  sodium chloride, 10 mL, Intravenous, Q12H          acetaminophen    aluminum-magnesium hydroxide-simethicone    [DISCONTINUED] senna-docusate sodium **AND** polyethylene glycol **AND** bisacodyl **AND** bisacodyl    calcium carbonate    cyclobenzaprine    dextrose    dextrose    glucagon (human recombinant)    loperamide    melatonin    metoprolol tartrate    [] Morphine **AND** naloxone    ondansetron    oxyCODONE    phenylephrine-mineral oil-petrolatum    [COMPLETED] Insert peripheral IV **AND** sodium chloride    sodium chloride    sodium chloride      Assessment / Plan     Assessment/Plan:  Sepsis poa 2/2 pna, bacteremia  RUL healthcare associated pneumonia  Streptococcus agalactiae bacteremia  Chronic osteomyelitis of right foot involving cuboid and maybe cuneiform.  On p.o. amoxicillin for 6 weeks  Cellulitis of right foot due to Streptococcus agalactiae.  Paroxysmal A-fib with intermittent RVR on Eliquis.  Heart rates have been well controlled  Bilateral diabetic foot ulcers.  NIDDM.  Most recent A1c 6.6 in 2023  Morbid obesity BMI of 44.8, down from 48.1 on admission  S/p TMA of right foot.  Hyponatremia.  likely from hyperglycemia/volume overload.  Clinically significant.  Resolved.  Hypophosphatemia.  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  Chronic shoulder neck pain  Hypertension.   Blood pressure stable.  Diarrhea most likely antibiotic associated  Hemorrhoids, nonbleeding  Prolonged hospitalization, admitted 5/12/23    Continue fiber and docusate per patient preference for hemorrhoid discomfort, monitor; declines annusol or suppository/cream for now  Monitor bowel regimen  Prn pain medications  HR reasonable on current regimen, cont digoxin, metop succinate, BP stable off of midodrine - may be able to uptitrate metoprolol some if needed   Continue apixaban for pafib  Cont po amoxicillin for osteomyelitis until 6/23, florastor   Levemir, ssi, lyrica, monitor blood glucose, titrate   Cont famotidine  C diff negative, prn loperamide if needed, discontinued scheduled bowel reg  Continue hospital monitoring and treatment at current level of care - does not need upgraded at this time.  Discuss with kalen, rn     Rehab placement difficulties, cont tx and monitoring as above. Too weak/debilitated to safely go home.       DVT prophylaxis:  Medical DVT prophylaxis orders are present.    Level Of Support Discussed With: Patient  Code Status (Patient has no pulse and is not breathing): CPR (Attempt to Resuscitate)  Medical Interventions (Patient has pulse or is breathing): Full Support        CBC          6/1/2023    06:29 6/6/2023    04:52 6/8/2023    05:20   CBC   WBC 4.20  3.62  4.01    RBC 4.37  4.61  4.50    Hemoglobin 10.6  11.3  10.7    Hematocrit 35.0  36.7  36.0    MCV 80.1  79.6  80.0    MCH 24.3  24.5  23.8    MCHC 30.3  30.8  29.7    RDW 16.9  16.5  16.4    Platelets 153  152  152      CMP          6/1/2023    06:29 6/6/2023    04:52 6/8/2023    05:20   CMP   Glucose 203  217  215    BUN 9  11  9    Creatinine 0.53  0.59  0.52    EGFR 111.9  108.3  112.6    Sodium 136  C 138  136    Potassium 4.5  C 3.8  4.2    Chloride 100  C 101  102    Calcium 8.4  8.8  8.4    Total Protein  6.9     Albumin  2.7     Globulin  4.2     Total Bilirubin  0.6     Alkaline Phosphatase  321     AST (SGOT)  32      ALT (SGPT)  18     Albumin/Globulin Ratio  0.6     BUN/Creatinine Ratio 17.0  18.6  17.3    Anion Gap 6.2  C 7.3  8.1       Details         C Corrected result

## 2023-06-12 NOTE — PLAN OF CARE
"Goal Outcome Evaluation:  Plan of Care Reviewed With: patient     Pt AOX4. Pt refused to turn. Pt educated on importance of turning. Pt stated \" I have been shifting throughout the night in bed\". Pt received PRN Oxycodone for pain. Pt. Refused bed alarm. Pt educated on importance of bed alarm and pt stated \" I ain't going anywhere\".      "

## 2023-06-13 LAB
GLUCOSE BLDC GLUCOMTR-MCNC: 124 MG/DL (ref 70–99)
GLUCOSE BLDC GLUCOMTR-MCNC: 149 MG/DL (ref 70–99)
GLUCOSE BLDC GLUCOMTR-MCNC: 150 MG/DL (ref 70–99)
GLUCOSE BLDC GLUCOMTR-MCNC: 218 MG/DL (ref 70–99)

## 2023-06-13 PROCEDURE — 63710000001 INSULIN LISPRO (HUMAN) PER 5 UNITS: Performed by: INTERNAL MEDICINE

## 2023-06-13 PROCEDURE — 99232 SBSQ HOSP IP/OBS MODERATE 35: CPT | Performed by: INTERNAL MEDICINE

## 2023-06-13 PROCEDURE — 63710000001 INSULIN DETEMIR PER 5 UNITS: Performed by: INTERNAL MEDICINE

## 2023-06-13 PROCEDURE — 97110 THERAPEUTIC EXERCISES: CPT

## 2023-06-13 PROCEDURE — 94799 UNLISTED PULMONARY SVC/PX: CPT

## 2023-06-13 PROCEDURE — 82948 REAGENT STRIP/BLOOD GLUCOSE: CPT

## 2023-06-13 PROCEDURE — 63710000001 INSULIN LISPRO (HUMAN) PER 5 UNITS

## 2023-06-13 RX ADMIN — SIMETHICONE 80 MG: 80 TABLET, CHEWABLE ORAL at 18:32

## 2023-06-13 RX ADMIN — SIMETHICONE 80 MG: 80 TABLET, CHEWABLE ORAL at 21:07

## 2023-06-13 RX ADMIN — SIMETHICONE 80 MG: 80 TABLET, CHEWABLE ORAL at 12:42

## 2023-06-13 RX ADMIN — ATORVASTATIN CALCIUM 10 MG: 10 TABLET, FILM COATED ORAL at 21:07

## 2023-06-13 RX ADMIN — INSULIN LISPRO 12 UNITS: 100 INJECTION, SOLUTION INTRAVENOUS; SUBCUTANEOUS at 12:42

## 2023-06-13 RX ADMIN — APIXABAN 5 MG: 5 TABLET, FILM COATED ORAL at 21:07

## 2023-06-13 RX ADMIN — Medication 10 ML: at 09:46

## 2023-06-13 RX ADMIN — DIGOXIN 125 MCG: 125 TABLET ORAL at 12:42

## 2023-06-13 RX ADMIN — Medication 250 MG: at 21:07

## 2023-06-13 RX ADMIN — INSULIN LISPRO 12 UNITS: 100 INJECTION, SOLUTION INTRAVENOUS; SUBCUTANEOUS at 09:44

## 2023-06-13 RX ADMIN — OXYCODONE HYDROCHLORIDE 10 MG: 5 TABLET ORAL at 18:33

## 2023-06-13 RX ADMIN — METOPROLOL SUCCINATE 37.5 MG: 25 TABLET, EXTENDED RELEASE ORAL at 21:07

## 2023-06-13 RX ADMIN — AMOXICILLIN 1000 MG: 500 CAPSULE ORAL at 05:14

## 2023-06-13 RX ADMIN — CALCIUM POLYCARBOPHIL 625 MG: 625 TABLET, FILM COATED ORAL at 09:45

## 2023-06-13 RX ADMIN — SIMETHICONE 80 MG: 80 TABLET, CHEWABLE ORAL at 06:18

## 2023-06-13 RX ADMIN — INSULIN LISPRO 12 UNITS: 100 INJECTION, SOLUTION INTRAVENOUS; SUBCUTANEOUS at 18:32

## 2023-06-13 RX ADMIN — OXYCODONE HYDROCHLORIDE 10 MG: 5 TABLET ORAL at 05:14

## 2023-06-13 RX ADMIN — METOPROLOL SUCCINATE 37.5 MG: 25 TABLET, EXTENDED RELEASE ORAL at 09:45

## 2023-06-13 RX ADMIN — INSULIN LISPRO 2 UNITS: 100 INJECTION, SOLUTION INTRAVENOUS; SUBCUTANEOUS at 12:42

## 2023-06-13 RX ADMIN — Medication 250 MG: at 09:45

## 2023-06-13 RX ADMIN — Medication 10 ML: at 21:08

## 2023-06-13 RX ADMIN — AMOXICILLIN 1000 MG: 500 CAPSULE ORAL at 21:07

## 2023-06-13 RX ADMIN — INSULIN LISPRO 3 UNITS: 100 INJECTION, SOLUTION INTRAVENOUS; SUBCUTANEOUS at 21:08

## 2023-06-13 RX ADMIN — PREGABALIN 25 MG: 25 CAPSULE ORAL at 21:07

## 2023-06-13 RX ADMIN — PREGABALIN 25 MG: 25 CAPSULE ORAL at 14:12

## 2023-06-13 RX ADMIN — AMOXICILLIN 1000 MG: 500 CAPSULE ORAL at 14:12

## 2023-06-13 RX ADMIN — APIXABAN 5 MG: 5 TABLET, FILM COATED ORAL at 09:45

## 2023-06-13 RX ADMIN — INSULIN DETEMIR 52 UNITS: 100 INJECTION, SOLUTION SUBCUTANEOUS at 09:46

## 2023-06-13 RX ADMIN — FAMOTIDINE 40 MG: 20 TABLET ORAL at 09:45

## 2023-06-13 RX ADMIN — PREGABALIN 25 MG: 25 CAPSULE ORAL at 05:14

## 2023-06-13 RX ADMIN — INSULIN DETEMIR 52 UNITS: 100 INJECTION, SOLUTION SUBCUTANEOUS at 21:08

## 2023-06-13 NOTE — THERAPY TREATMENT NOTE
Acute Care - Physical Therapy Treatment Note  KEO Dominguez     Patient Name: Jose Shaikh  : 1960  MRN: [id number]  Today's Date: 2023      Visit Dx:     ICD-10-CM ICD-9-CM   1. Acute febrile illness  R50.9 780.60   2. Sepsis without acute organ dysfunction, due to unspecified organism  A41.9 038.9     995.91   3. Pneumonia of right upper lobe due to infectious organism  J18.9 486   4. Uncontrolled type 2 diabetes mellitus with hyperglycemia  E11.65 250.02   5. Atrial fibrillation with rapid ventricular response  I48.91 427.31   6. Acute osteomyelitis of right foot  M86.171 730.07   7. Decreased activities of daily living (ADL)  Z78.9 V49.89   8. Difficulty in walking  R26.2 719.7     Patient Active Problem List   Diagnosis    Diabetic ulcer of left heel associated with type 2 DM    Acute osteomyelitis of left calcaneus     Diabetic ulcer of left heel associated with type 2 DM    Diabetic ulcer of right midfoot associated with type 2 DM    Paroxysmal atrial fibrillation    Essential hypertension    Hyperlipidemia LDL goal <100    Cellulitis and abscess of foot    High alkaline phosphatase level    Osteomyelitis    Onychomycosis    Onychocryptosis    Foot pain, bilateral    Osteomyelitis of foot, right, acute    Cellulitis of right foot    Type 2 diabetes mellitus, with long-term current use of insulin    Class 3 severe obesity due to excess calories with serious comorbidity and body mass index (BMI) of 45.0 to 49.9 in adult    Anxiety disorder, unspecified    Claustrophobia    Dependence on wheelchair    Depression, unspecified    Long term (current) use of anticoagulants    Long term (current) use of oral hypoglycemic drugs    Wound of foot    Non-prs chronic ulcer oth prt r foot limited to brkdwn skin    Orthostatic hypotension    Other chronic osteomyelitis, right ankle and foot    Personal history of nicotine dependence    Thrombocytopenia, unspecified    Unspecified open wound, right foot, initial  encounter    Diabetic foot infection    Subacute osteomyelitis of right foot    Right foot pain    Sepsis    Onychomycosis    Foot pain, left     Past Medical History:   Diagnosis Date    Absence of toe of right foot     Acute osteomyelitis of left calcaneus  8/18/2021    Anxiety and depression     Arthritis     Claustrophobia     Corns and callus     Diabetic ulcer of left heel associated with type 2 DM 8/18/2021    Diabetic ulcer of left heel associated with type 2 DM 7/6/2021    Diabetic ulcer of right midfoot associated with type 2 DM 8/18/2021    Difficulty walking     Essential hypertension 8/31/2021    Hammertoe     Hyperlipidemia LDL goal <100 8/31/2021    Ingrown toenail     Obesity     Paroxysmal atrial fibrillation 8/31/2021    Polyneuropathy     Pressure ulcer, stage 1     Tinea unguium     Type 2 diabetes mellitus with polyneuropathy      Past Surgical History:   Procedure Laterality Date    CYST REMOVAL      center of back; benign    INCISION AND DRAINAGE ABSCESS      back    INCISION AND DRAINAGE LEG Right 12/10/2021    Procedure: INCISION AND DRAINAGE LOWER EXTREMITY;  Surgeon: Ash Leyva DPM;  Location: [facility];  Service: Podiatry;  Laterality: Right;    OTHER SURGICAL HISTORY      Surgical clips left foot    TOE SURGERY Right     Removal of 5th toe    TRANS METATARSAL AMPUTATION Right 12/2/2021    Procedure: AMPUTATION TRANS METATARSAL;  Surgeon: Ash Leyva DPM;  Location: [facility] OR;  Service: Podiatry;  Laterality: Right;    WRIST SURGERY Left     repair of injury     PT Assessment (last 12 hours)       PT Evaluation and Treatment       Row Name 06/13/23 1115          Physical Therapy Time and Intention    Subjective Information complains of;weakness;fatigue  -DK     Document Type therapy note (daily note)  -DK     Mode of Treatment individual therapy;physical therapy  -DK     Patient Effort good  -DK     Symptoms Noted During/After Treatment none  -DK        Row Name 06/13/23 1113          Pain    Pretreatment Pain Rating 5/10  -DK     Posttreatment Pain Rating 5/10  -DK     Pain Location - Side/Orientation Bilateral  -DK     Pain Location generalized  -DK     Pain Location - back;hip;knee;foot  -DK     Pain Intervention(s) Distraction;Therapeutic presence  -       Row Name 06/13/23 1113          Cognition    Affect/Mental Status (Cognition) WFL  -DK     Orientation Status (Cognition) oriented x 4  -DK     Follows Commands (Cognition) WFL  -DK     Cognitive Function WFL  -DK     Personal Safety Interventions nonskid shoes/slippers when out of bed;supervised activity  -       Row Name 06/13/23 1113          Motor Skills    Motor Skills --  therapeutic exercises  -DK     Coordination WFL  -DK     Therapeutic Exercise hip;knee;ankle  -DK     Additional Documentation --  Pt flatly declined all transfers.  -       Row Name 06/13/23 1113          Hip (Therapeutic Exercise)    Hip (Therapeutic Exercise) AAROM (active assistive range of motion)  -DK     Hip AAROM (Therapeutic Exercise) bilateral;flexion;extension;aBduction;aDduction;supine;10 repetitions;2 sets  -       Row Name 06/13/23 1113          Knee (Therapeutic Exercise)    Knee (Therapeutic Exercise) AAROM (active assistive range of motion)  -DK     Knee AAROM (Therapeutic Exercise) bilateral;flexion;extension;supine;10 repetitions;2 sets  -       Row Name 06/13/23 1113          Ankle (Therapeutic Exercise)    Ankle (Therapeutic Exercise) AAROM (active assistive range of motion)  -DK     Ankle AAROM (Therapeutic Exercise) bilateral;dorsiflexion;plantarflexion;supine;10 repetitions;2 sets  -       Row Name             Wound 12/02/21 Right anterior foot Incision    Wound - Properties Group Placement Date: 12/02/21  -ES Side: Right  -ES Orientation: anterior  -ES Location: foot  -ES Primary Wound Type: Incision  -ES    Retired Wound - Properties Group Placement Date: 12/02/21  -ES Side: Right  -ES Orientation:  anterior  -ES Location: foot  -ES Primary Wound Type: Incision  -ES    Retired Wound - Properties Group Date first assessed: 12/02/21  -ES Side: Right  -ES Location: foot  -ES Primary Wound Type: Incision  -ES      Row Name             Wound 06/22/21 1133 Left lateral heel    Wound - Properties Group Placement Date: 06/22/21  -FABIOLA Placement Time: 1133  -FABIOLA Present on Hospital Admission: Y  -FABIOLA Side: Left  -FABIOLA Orientation: lateral  -FABIOLA Location: heel  -FABIOLA    Retired Wound - Properties Group Placement Date: 06/22/21  -FABIOLA Placement Time: 1133  -FABIOLA Present on Hospital Admission: Y  -FABIOLA Side: Left  -FABIOLA Orientation: lateral  -FABIOLA Location: heel  -FABIOLA    Retired Wound - Properties Group Date first assessed: 06/22/21  -FABIOLA Time first assessed: 1133  -FABIOLA Present on Hospital Admission: Y  -FABIOLA Side: Left  -FABIOLA Location: heel  -FABIOLA      Row Name             Wound 05/19/23 1317 gluteal Blisters    Wound - Properties Group Placement Date: 05/19/23  -RASHARD Placement Time: 1317  -RASHARD Present on Hospital Admission: N  -RASHARD Location: gluteal  -RASHARD Primary Wound Type: Blisters  -RASHARD    Retired Wound - Properties Group Placement Date: 05/19/23  -RASHARD Placement Time: 1317  -RASHARD Present on Hospital Admission: N  -RASHARD Location: gluteal  -RASHARD Primary Wound Type: Blisters  -RASHARD    Retired Wound - Properties Group Date first assessed: 05/19/23  -RASHARD Time first assessed: 1317  -RASHARD Present on Hospital Admission: N  -RASHARD Location: gluteal  -RASHARD Primary Wound Type: Blisters  -RASHARD      Row Name 06/13/23 1113          Plan of Care Review    Plan of Care Reviewed With patient  -DK     Progress no change  -DK       Row Name 06/13/23 1113          Positioning and Restraints    Pre-Treatment Position in bed  -DK     Post Treatment Position bed  -DK     In Bed supine;call light within reach;encouraged to call for assist;exit alarm on;with nsg;legs elevated;heels elevated  side rails up x 4  -DK       Row Name 06/13/23 1113          Therapy Assessment/Plan (PT)    Rehab  Potential (PT) fair, will monitor progress closely  -DK     Criteria for Skilled Interventions Met (PT) skilled treatment is necessary  -DK     Therapy Frequency (PT) daily  -DK     Problem List (PT) problems related to;balance;mobility;range of motion (ROM);strength;pain  -DK     Activity Limitations Related to Problem List (PT) unable to ambulate safely;unable to transfer safely  -DK       Row Name 06/13/23 1113          Progress Summary (PT)    Progress Toward Functional Goals (PT) progress toward functional goals is gradual  -DK               User Key  (r) = Recorded By, (t) = Taken By, (c) = Cosigned By      Initials Name Provider Type    Karon Jordan, RN Registered Nurse    Mckenna Landaverde PTA Physical Therapist Assistant    Marlen Vaughn RN Registered Nurse    Katlyn Garcia RN Registered Nurse                      PT Recommendation and Plan  Planned Therapy Interventions (PT): balance training, bed mobility training, home exercise program, strengthening, transfer training  Therapy Frequency (PT): daily  Progress Summary (PT)  Progress Toward Functional Goals (PT): progress toward functional goals is gradual  Plan of Care Reviewed With: patient  Progress: no change   Outcome Measures       Row Name 06/13/23 1113 06/12/23 1100 06/11/23 0900       How much help from another person do you currently need...    Turning from your back to your side while in flat bed without using bedrails? 3  -DK 3  -AV (r) MB (t) AV (c) 3  -DK    Moving from lying on back to sitting on the side of a flat bed without bedrails? 3  -DK 3  -AV (r) MB (t) AV (c) 2  -DK    Moving to and from a bed to a chair (including a wheelchair)? 2  -DK 2  -AV (r) MB (t) AV (c) 2  -DK    Standing up from a chair using your arms (e.g., wheelchair, bedside chair)? 2  -DK 2  -AV (r) MB (t) AV (c) 2  -DK    Climbing 3-5 steps with a railing? 1  -DK 1  -AV (r) MB (t) AV (c) 1  -DK    To walk in hospital room? 1  -DK 1  -AV (r) MB (t)  AV (c) 1  -DK    AM-PAC 6 Clicks Score (PT) 12  -DK 12  -AV (r) MB (t) 11  -DK       Functional Assessment    Outcome Measure Options AM-PAC 6 Clicks Basic Mobility (PT)  -DK AM-PAC 6 Clicks Basic Mobility (PT)  -AV (r) MB (t) AV (c) AM-PAC 6 Clicks Basic Mobility (PT)  -DK              User Key  (r) = Recorded By, (t) = Taken By, (c) = Cosigned By      Initials Name Provider Type    Mckenna Landaverde PTA Physical Therapist Assistant    AV Frankie Ospina, PT Physical Therapist    Gilbert Bennett, PT Student PT Student                     Time Calculation:    PT Charges       Row Name 06/13/23 1116             Time Calculation    PT Received On 06/13/23  -DK      PT Goal Re-Cert Due Date 06/14/23  -DK         Timed Charges    27172 - PT Therapeutic Exercise Minutes 14  -DK      39391 - PT Therapeutic Activity Minutes 3  -DK         Total Minutes    Timed Charges Total Minutes 17  -DK       Total Minutes 17  -DK                User Key  (r) = Recorded By, (t) = Taken By, (c) = Cosigned By      Initials Name Provider Type    Mckenna Landaverde PTA Physical Therapist Assistant                  Therapy Charges for Today       Code Description Service Date Service Provider Modifiers Qty    [id number] HC PT THER PROC EA 15 MIN 6/13/2023 Mckenna Wong PTA GP 1            PT G-Codes  Outcome Measure Options: AM-PAC 6 Clicks Basic Mobility (PT)  AM-PAC 6 Clicks Score (PT): 12  AM-PAC 6 Clicks Score (OT): 17    Mckenna Wong PTA  6/13/2023

## 2023-06-13 NOTE — PLAN OF CARE
Goal Outcome Evaluation:           Progress: no change    Medicated patient X2 with PRN Oxycodone. Patient resting comfortably in bed. Vital signs stable, will continue to monitor.       Progress Notes by Luis Sahu MD at 02/05/18 11:53 AM     Author:  Luis Sahu MD Service:  (none) Author Type:  Physician     Filed:  02/05/18 03:54 PM Encounter Date:  2/5/2018 Status:  Signed     :  Luis Sahu MD (Physician)              PEDIATRIC ILLNESS VISIT       2/5/2018    Roomed by: LORENE Alanis 11:53 AM      SUBJECTIVE  Accompanied by:[EJ1.1T]  Mother ok to leave message [phone][EJ1.1M]  Maxx is a 4 month old male who is complaining of[EJ1.1T] cough and wheezing[EJ1.1M].  Present for[EJ1.1T] several days[KB1.1M] and is[EJ1.1T] stable[KB1.1M].  Present treatments include -[EJ1.1T] home care[KB1.1M].   Previous medical contacts for the problem -[EJ1.1T] Walk in Care yesterday - advised to be seen again today[KB1.1M]  Symptoms:  Fever:[EJ1.1T]     No elevation of temperature[KB1.1M]  General:[EJ1.1T] No irritability or lethargy noted  Heent+Mouth:       NOSE problems: clear drainage  Pulmonary:    COUGHING described as: congested[KB1.1M]    ROS  All other ROS negative unless indicated otherwise above.    Allergies:  Review of patient's allergies indicates no known allergies.    No current outpatient prescriptions on file.       Family history:[EJ1.1T] No other family members have acute illnesses[KB1.1M]    Social history:[EJ1.1T] Not in  or school[KB1.1M]       OBJECTIVE:   Physical exam[EJ1.1T]  Pulse 138  Temp 97.4 °F (36.3 °C) (Temporal)   Resp 30  Wt 15 lb 6.4 oz (6.985 kg)  SpO2 99%[EJ1.2T]    GENERAL:[EJ1.1T] alert, no distress HEAD & SCALP: No lesions, swelling, tenderness or abnormalities.  HEAD & SCALP: anterior fontanelle soft and flat  EYES: No redness, swelling, drainage or abnormalities.  EARS: No abnormalities of external ears, canals or tm's.  NOSE: No swelling or drainage..  MOUTH: No abnormalities of tongue or mucosal membranes.  THROAT: No redness or lesions.  NECK: No masses, swelling or tenderness. No abnormal lymph nodes.  CHEST:  Lungs are clear to auscultation and no retractions.  CARDIO: No murmur or cardiac rhythm irregularities.  SKIN: No rashes, lesions.    ASSESSME[KB1.1M]NT/PLAN[EJ1.1T]  URI:..................................Push fluids, Call if worsens, Suction nose with bulb syringe, Saline nose drops tid to help remove mucous and Upright positioning to help with breathing and secretions[KB1.1M]  Reassurance & Empathy given   Discussed pathophysiology and expected course of illness.[KB1.1T]   Medication changes:[EJ1.1T] No[KB1.1M]    Immunizations given today?[EJ1.1T] No.[KB1.1M]  See Orders:  Instructed to call if the problem worsens or does not improve within the next 24 to 48 hours.    Schedule follow-up:[EJ1.1T] prn[KB1.1M]    Electronically Signed by:    Luis Sahu MD , 2/5/2018[KB1.1T]         Revision History        User Key Date/Time User Provider Type Action    > KB1.1 02/05/18 03:54 PM Luis Sahu MD Physician Sign     EJ1.2 02/05/18 11:57 AM Anel Becerra NCMA Certified Medical Assistant Sign at close encounter     EJ1.1 02/05/18 11:53 AM Anel Becerra NCMA Certified Medical Assistant     M - Manual, T - Template

## 2023-06-13 NOTE — SIGNIFICANT NOTE
Wound Eval / Progress Noted    KEO Dominguez     Patient Name: Jose Shaikh  : 1960  MRN: [id number]  Today's Date: 2023                 Admit Date: 2023    Visit Dx:    ICD-10-CM ICD-9-CM   1. Acute febrile illness  R50.9 780.60   2. Sepsis without acute organ dysfunction, due to unspecified organism  A41.9 038.9     995.91   3. Pneumonia of right upper lobe due to infectious organism  J18.9 486   4. Uncontrolled type 2 diabetes mellitus with hyperglycemia  E11.65 250.02   5. Atrial fibrillation with rapid ventricular response  I48.91 427.31   6. Acute osteomyelitis of right foot  M86.171 730.07   7. Decreased activities of daily living (ADL)  Z78.9 V49.89   8. Difficulty in walking  R26.2 719.7       Patient Active Problem List   Diagnosis    Diabetic ulcer of left heel associated with type 2 DM    Acute osteomyelitis of left calcaneus     Diabetic ulcer of left heel associated with type 2 DM    Diabetic ulcer of right midfoot associated with type 2 DM    Paroxysmal atrial fibrillation    Essential hypertension    Hyperlipidemia LDL goal <100    Cellulitis and abscess of foot    High alkaline phosphatase level    Osteomyelitis    Onychomycosis    Onychocryptosis    Foot pain, bilateral    Osteomyelitis of foot, right, acute    Cellulitis of right foot    Type 2 diabetes mellitus, with long-term current use of insulin    Class 3 severe obesity due to excess calories with serious comorbidity and body mass index (BMI) of 45.0 to 49.9 in adult    Anxiety disorder, unspecified    Claustrophobia    Dependence on wheelchair    Depression, unspecified    Long term (current) use of anticoagulants    Long term (current) use of oral hypoglycemic drugs    Wound of foot    Non-prs chronic ulcer oth prt r foot limited to brkdwn skin    Orthostatic hypotension    Other chronic osteomyelitis, right ankle and foot    Personal history of nicotine dependence    Thrombocytopenia, unspecified    Unspecified open  wound, right foot, initial encounter    Diabetic foot infection    Subacute osteomyelitis of right foot    Right foot pain    Sepsis    Onychomycosis    Foot pain, left        Past Medical History:   Diagnosis Date    Absence of toe of right foot     Acute osteomyelitis of left calcaneus  8/18/2021    Anxiety and depression     Arthritis     Claustrophobia     Corns and callus     Diabetic ulcer of left heel associated with type 2 DM 8/18/2021    Diabetic ulcer of left heel associated with type 2 DM 7/6/2021    Diabetic ulcer of right midfoot associated with type 2 DM 8/18/2021    Difficulty walking     Essential hypertension 8/31/2021    Hammertoe     Hyperlipidemia LDL goal <100 8/31/2021    Ingrown toenail     Obesity     Paroxysmal atrial fibrillation 8/31/2021    Polyneuropathy     Pressure ulcer, stage 1     Tinea unguium     Type 2 diabetes mellitus with polyneuropathy         Past Surgical History:   Procedure Laterality Date    CYST REMOVAL      center of back; benign    INCISION AND DRAINAGE ABSCESS      back    INCISION AND DRAINAGE LEG Right 12/10/2021    Procedure: INCISION AND DRAINAGE LOWER EXTREMITY;  Surgeon: Ash Leyva DPM;  Location: [facility];  Service: Podiatry;  Laterality: Right;    OTHER SURGICAL HISTORY      Surgical clips left foot    TOE SURGERY Right     Removal of 5th toe    TRANS METATARSAL AMPUTATION Right 12/2/2021    Procedure: AMPUTATION TRANS METATARSAL;  Surgeon: Ash Leyva DPM;  Location: [facility];  Service: Podiatry;  Laterality: Right;    WRIST SURGERY Left     repair of injury         Physical Assessment:  Wound 06/22/21 1133 Left lateral heel (Active)   Wound Image   06/13/23 1200   Dressing Appearance intact;dry 06/13/23 1200   Closure None 06/13/23 1200   Base dry;scab;yellow 06/13/23 1200   Periwound dry;intact 06/13/23 1200   Periwound Temperature warm 06/13/23 1200   Periwound Skin Turgor firm 06/13/23 1200   Edges open;rolled/closed  06/13/23 1200   Drainage Amount none 06/13/23 1200   Care, Wound cleansed with;sterile normal saline 06/13/23 1200   Dressing Care dressing applied;dressing moistened;antimicrobial agent applied;packed with;gauze, dry;gauze;tubular wrap;other (see comments) 06/13/23 1200   Periwound Care absorptive dressing applied 06/13/23 1200       Wound 12/02/21 Right anterior foot Incision (Active)   Wound Image   06/13/23 1200   Dressing Appearance intact;dry 06/13/23 1200   Closure None 06/13/23 1200   Base dry;other (see comments);scab 06/13/23 1200   Periwound dry;redness 06/13/23 1200   Periwound Temperature cool 06/13/23 1200   Periwound Skin Turgor firm 06/13/23 1200   Edges callused 06/13/23 1200   Drainage Amount none 06/13/23 1200   Care, Wound cleansed with;sterile normal saline 06/13/23 1200   Dressing Care dressing applied;dressing moistened;antimicrobial agent applied;gauze, dry;gauze;tubular wrap;elastic bandage;other (see comments) 06/13/23 1200   Periwound Care absorptive dressing applied 06/13/23 1200       Wound 05/19/23 1317 gluteal Blisters (Active)   Periwound Care barrier ointment applied 06/12/23 2109        Wound Check / Follow-up: Patient seen today for a wound follow up and dressing change. Chronic wounds to the bilateral lower amputation sites. All tissue is dry, with thickened callused tissue present. No open wounds noted to the right foot; open wound base to the left plantar foot with extremely dry base. Periwound's to bilateral feet with thickened dry callused tissue. Cleansed and irrigated all tissue with NS and gauze. Allowed for NS moistened gauze to sit on foot to soften callus. Applied betadine moistened gauze to wound site and covered with dry gauze and gauze roll. Will discuss wound findings with MD.  Patient would not allow for assessment of buttocks despite education and discussion. Staff and  patient difficulty with compliance and allowing for staff to turn  "patient. When asked patient stated, \"Its my a** and if I was an hole in it, that's my business.\" Educated the repercussions of not allowing staff to turn and how quickly injuries can occur and progress. Patient verbalizes understanding but further refuses at this time. Encourage staff to continue education and attempting to turn patient    Impression: chronic wounds to bilateral feet    Short term goals:  regain skin integrity, pressure reduction, skin protection, pressure reduction    Karon Bolton RN    6/13/2023    14:42 EDT   "

## 2023-06-13 NOTE — CONSULTS
Nutrition Services    Patient Name: Jose Shaikh  YOB: 1960  MRN: [id number]  Admission date: 5/12/2023      CLINICAL NUTRITION ASSESSMENT      Reason for Assessment  Follow-up protocol   H&P:    Past Medical History:   Diagnosis Date    Absence of toe of right foot     Acute osteomyelitis of left calcaneus  8/18/2021    Anxiety and depression     Arthritis     Claustrophobia     Corns and callus     Diabetic ulcer of left heel associated with type 2 DM 8/18/2021    Diabetic ulcer of left heel associated with type 2 DM 7/6/2021    Diabetic ulcer of right midfoot associated with type 2 DM 8/18/2021    Difficulty walking     Essential hypertension 8/31/2021    Hammertoe     Hyperlipidemia LDL goal <100 8/31/2021    Ingrown toenail     Obesity     Paroxysmal atrial fibrillation 8/31/2021    Polyneuropathy     Pressure ulcer, stage 1     Tinea unguium     Type 2 diabetes mellitus with polyneuropathy         Current Problems:   Active Hospital Problems    Diagnosis     **Sepsis     Onychomycosis     Foot pain, left     Right foot pain     Osteomyelitis of foot, right, acute     Wound of foot     Diabetic ulcer of right midfoot associated with type 2 DM         Nutrition/Diet History         Narrative     Nutrition follow up.   63 yo Male admitted with sepsis - R upper lobe pneumonia and sepsis/osteomyelitis of right foot.  Right TMA.  Significant diabetic ulcer on left lower extremity.  DJD mike hips, Diabetes Mellitus, HTN    Continues on antibiotics for osteomyelitis of diabetic ulers.  Wound to Left Heel is 1 cm deep.  Right TMA is healing   Andrew Score 13    Patient feeds self 100% Heart Healthy Diet, Consistent Carbohydrates/ regular texture with a 2000 ml fluid restriction. Pt reports he order Door Dash if unhappy with the meal served.  Provided limited consultation regarding the need for fluid restriction as well as sodium restriction.    Weight is fluctuating significantly.  Wt currently up 43#  "in two days, (12.9% increase).  Wt had previously dropped from 375# to 341# in a 30 day time period (9% decrease).  Discussed wt gain with pt and nurse and it is felt that the weights are inaccurate.  Nursing to reweigh today for further assessment.     BMI 48.12 Obese  Pt is 188% of #    MST score = 0    Nutrition Acuity Score = 12 High Risk for further decline    RD will continue to follow and monitor per protocol.    NFPE conducted without significant findings.  Does show some modest loss of muscle at the dorsal, acromion and clavicle area.  Not significant.     Anthropometrics        Current Height, Weight Height: 188 cm (74\")  Weight: (!) 170 kg (374 lb 12.5 oz)   Current BMI Body mass index is 48.12 kg/m².       Weight Hx  Wt Readings from Last 30 Encounters:   06/13/23 0600 (!) 170 kg (374 lb 12.5 oz)   06/12/23 0420 (!) 160 kg (352 lb 11.8 oz)   06/11/23 0447 (!) 150 kg (331 lb 5.6 oz)   06/10/23 0500 (!) 150 kg (330 lb 14.6 oz)   06/09/23 0536 (!) 156 kg (343 lb 7.6 oz)   06/08/23 1826 (!) 156 kg (344 lb 11.2 oz)   06/08/23 0522 (!) 158 kg (348 lb 8.8 oz)   06/07/23 0548 (!) 155 kg (342 lb 9.5 oz)   06/06/23 0515 (!) 155 kg (341 lb 14.9 oz)   06/05/23 0445 (!) 155 kg (341 lb 9.6 oz)   06/05/23 0348 (!) 158 kg (349 lb 3.3 oz)   06/04/23 0555 (!) 157 kg (346 lb 3.2 oz)   06/03/23 0539 (!) 158 kg (349 lb)   06/02/23 0548 (!) 163 kg (359 lb 3.2 oz)   06/01/23 0600 (!) 164 kg (362 lb)   05/31/23 0507 (!) 164 kg (362 lb 4.8 oz)   05/30/23 0635 (!) 164 kg (362 lb 7 oz)   05/29/23 0500 (!) 167 kg (368 lb 2.7 oz)   05/28/23 0600 (!) 167 kg (367 lb 11.6 oz)   05/27/23 0600 (!) 167 kg (369 lb 0.8 oz)   05/26/23 0529 (!) 167 kg (369 lb 3.2 oz)   05/25/23 0600 (!) 168 kg (370 lb 3.2 oz)   05/24/23 0600 (!) 166 kg (366 lb 9.6 oz)   05/22/23 0529 (!) 169 kg (373 lb 7.4 oz)   05/21/23 0600 (!) 169 kg (371 lb 12.8 oz)   05/20/23 0600 (!) 171 kg (376 lb 5.2 oz)   05/19/23 0300 (!) 168 kg (370 lb 14.4 oz)   05/18/23 " 1912 (!) 168 kg (371 lb 7.6 oz)   05/18/23 0600 (!) 169 kg (371 lb 11.1 oz)   05/16/23 0700 (!) 171 kg (377 lb 4.8 oz)   05/14/23 0500 (!) 171 kg (377 lb 3.3 oz)   05/12/23 1143 (!) 170 kg (375 lb)   05/06/23 0258 (!) 170 kg (375 lb 8 oz)   04/19/23 0909 (!) 163 kg (359 lb)   04/03/23 1906 (!) 168 kg (370 lb)   03/27/23 0938 (!) 170 kg (373 lb 10.9 oz)   03/17/23 1153 (!) 168 kg (370 lb)   01/27/23 1501 (!) 168 kg (370 lb)   12/22/22 1501 (!) 171 kg (376 lb)   11/08/22 1035 (!) 161 kg (355 lb)   10/01/22 1141 (!) 164 kg (360 lb 10.8 oz)   05/18/22 1311 (!) 155 kg (341 lb)   03/24/22 1432 (!) 155 kg (341 lb)   03/02/22 1412 (!) 155 kg (341 lb)   01/12/22 1317 (!) 155 kg (341 lb)   12/30/21 1431 (!) 155 kg (341 lb)   12/01/21 1144 (!) 155 kg (341 lb 11.4 oz)   12/01/21 0843 (!) 157 kg (346 lb)   11/22/21 0839 (!) 157 kg (346 lb)   11/15/21 1148 (!) 157 kg (346 lb 12.5 oz)   11/09/21 1139 (!) 157 kg (345 lb 7.4 oz)   11/05/21 1130 (!) 159 kg (351 lb 3.1 oz)   11/02/21 1121 (!) 161 kg (356 lb)   10/27/21 1048 (!) 161 kg (356 lb)   10/21/21 1418 (!) 162 kg (356 lb 14.8 oz)   10/20/21 1120 (!) 160 kg (353 lb)   10/12/21 1240 (!) 160 kg (353 lb 13.4 oz)   10/06/21 1035 (!) 161 kg (354 lb)   09/29/21 1339 (!) 166 kg (365 lb 15.4 oz)   09/29/21 0857 (!) 166 kg (365 lb)   09/23/21 1333 (!) 166 kg (365 lb 1.3 oz)            Wt Change Observation Weight is down 34# in 30 days (9%).  Wt loss and gain is fluid related.       Estimated/Assessed Needs       Energy Requirements 25 kcal/kg adj BW (103.4 kg)   EST Needs (kcal/day) 2585 kcal       Protein Requirements 1.0-1.2 g/kg adj BW   EST Daily Needs (g/day) 103-124 g       Fluid Requirements 25 ml/kg IBW    Estimated Needs (mL/day) 2055 ml     Labs/Medications         Pertinent Labs Reviewed.   Results from last 7 days   Lab Units 06/08/23  0520   SODIUM mmol/L 136   POTASSIUM mmol/L 4.2   CHLORIDE mmol/L 102   CO2 mmol/L 25.9   BUN mg/dL 9   CREATININE mg/dL 0.52*   CALCIUM  mg/dL 8.4*   GLUCOSE mg/dL 215*       Results from last 7 days   Lab Units 06/08/23  0520   MAGNESIUM mg/dL 1.7   HEMOGLOBIN g/dL 10.7*   HEMATOCRIT % 36.0*       COVID19   Date Value Ref Range Status   05/12/2023 Not Detected Not Detected - Ref. Range Final     Lab Results   Component Value Date    HGBA1C 6.60 (H) 04/19/2023         Pertinent Medications Reviewed.     Current Nutrition Orders & Evaluation of Intake       Oral Nutrition     Current PO Diet Diet: Cardiac Diets, Fluid Restriction (240 mL/tray) Diets, Diabetic Diets; Healthy Heart (2-3 Na+); Consistent Carbohydrate; 2000 mL/day; Texture: Regular Texture (IDDSI 7); Fluid Consistency: Thin (IDDSI 0)   Supplement No active supplement orders       Malnutrition Severity Assessment                Nutrition Diagnosis         Nutrition Dx Problem 1 No nutrition diagnosis at this time.     Nutrition Intervention         No intervention indicated.      Medical Nutrition Therapy/Nutrition Education          Learner     Readiness Patient  Acceptance/reluctance     Method     Response Explanation  Needs reinforcement     Monitor/Evaluation        Monitor Per protocol.     Nutrition Discharge Plan         No nutrition needs identified at this time.     Electronically signed by:  Carmel Patino RD  06/13/23 09:00 EDT

## 2023-06-13 NOTE — PROGRESS NOTES
[facility]   Hospitalist Progress Note  Date: 2023  Patient Name: Jose Shaikh  : 1960  MRN: [id number]  Date of admission: 2023  Consultants:   -Podiatry: Dr. Ash Leyva    Subjective   Subjective     Chief Complaint: Hip and shoulder pain    Summary:   Jose Shaikh is a 62 y.o. male with history of osteomyelitis, type 2 diabetes mellitus, obesity (BMI: 48.12), paroxysmal atrial fibrillation and recurrent/chronic foot infections who presented with complaints of worsening hip pain.  Of note, patient had been admitted to hospital in early 2023 for foot infection.  At that time there was recommendation for amputation due to osteomyelitis in the foot but the patient refused.  Placement attempted at that time but unsuccessful as patient is a sex offender and makes placement difficult.  He was treated with oral antibiotics with doxycycline and ciprofloxacin for Alcaligenes facialis and Morganella morganii after last admission.  He follows with wound care/Dr. Canales and attention made to switch antibiotics to Levaquin per pharmacy recommendation based on culture data but there is difficulty getting a hold of the patient therefore antibiotics were never switched.  During this admission treatment for right foot osteomyelitis and bacteremia initiated with 6-week course of amoxicillin per ID.  For hip pain, x-ray pelvis and hip obtained and showed DJD of bilateral hips left worse than right.  MRI was refused by the patient due to claustrophobia.  Symptoms stabilized and patient intermittently working with PT services.    Interval Followup:   No acute events overnight.  Patient denies chest pain or shortness of breath.  Patient receiving as needed oxycodone for pain management.  Nursing with no additional acute issues to report.    Antibiotics:   -Amoxicillin    Pain Medication:   -Oxycodone    Review of Systems   All systems reviewed and negative unless stated otherwise under  subjective.    Objective   Objective     Vitals:   Temp:  [97.7 °F (36.5 °C)-98.4 °F (36.9 °C)] 97.9 °F (36.6 °C)  Heart Rate:  [] 71  Resp:  [16-18] 16  BP: (101-133)/(59-77) 123/70  Physical Exam   Gen: No acute distress, Conversant, sitting up in bed watching TV  Resp: CTAB, No w/r/r, No respiratory distress appreciated  Card: RRR, No m/r/g  Abd: Soft, Nontender, Nondistended, + bowel sounds    Result Review    Result Review:  I have personally reviewed the results as below and agree with these findings:  []  Laboratory:   CMP          6/1/2023    06:29 6/6/2023    04:52 6/8/2023    05:20   CMP   Glucose 203  217  215    BUN 9  11  9    Creatinine 0.53  0.59  0.52    EGFR 111.9  108.3  112.6    Sodium 136  C 138  136    Potassium 4.5  C 3.8  4.2    Chloride 100  C 101  102    Calcium 8.4  8.8  8.4    Total Protein  6.9     Albumin  2.7     Globulin  4.2     Total Bilirubin  0.6     Alkaline Phosphatase  321     AST (SGOT)  32     ALT (SGPT)  18     Albumin/Globulin Ratio  0.6     BUN/Creatinine Ratio 17.0  18.6  17.3    Anion Gap 6.2  C 7.3  8.1       Details         C Corrected result             CBC          6/1/2023    06:29 6/6/2023    04:52 6/8/2023    05:20   CBC   WBC 4.20  3.62  4.01    RBC 4.37  4.61  4.50    Hemoglobin 10.6  11.3  10.7    Hematocrit 35.0  36.7  36.0    MCV 80.1  79.6  80.0    MCH 24.3  24.5  23.8    MCHC 30.3  30.8  29.7    RDW 16.9  16.5  16.4    Platelets 153  152  152      Blood glucose well controlled.    []  Microbiology:   []  Radiology:   []  EKG/Telemetry:    []  Cardiology/Vascular:    []  Pathology:  []  Old records:  []  Other:    Assessment & Plan   Assessment / Plan     Assessment:  Sepsis poa 2/2 pna, bacteremia  RUL healthcare associated pneumonia  Streptococcus agalactiae bacteremia  Chronic osteomyelitis of right foot involving cuboid and maybe cuneiform.  On p.o. amoxicillin for 6 weeks  Cellulitis of right foot due to Streptococcus agalactiae.  Paroxysmal A-fib  with intermittent RVR on Eliquis.  Heart rates have been well controlled  Bilateral diabetic foot ulcers.  NIDDM.  Most recent A1c 6.6 in April 2023  Morbid obesity BMI of 44.8, down from 48.1 on admission  S/p TMA of right foot.  Hyponatremia.  likely from hyperglycemia/volume overload.  Clinically significant.  Resolved.  Hypophosphatemia.  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  Chronic shoulder neck pain  Hypertension.  Blood pressure stable.  Diarrhea most likely antibiotic associated  Hemorrhoids, nonbleeding  Prolonged hospitalization, admitted 5/12/23    Plan:  -Podiatry consulted.  Wound evaluated by wound care service who noted that left amputation site and thickened tissue to cover the wound there is some concern for masking of the ulceration.  Podiatry recontacted for evaluation  -Continue amoxicillin with plan to treat for total of 6 weeks  -Continue pain management as needed oxycodone  -Continue current insulin regimen  -Continue digoxin, metoprolol at current rate  -Continue apixaban  -PT/OT consulted and following  -Management discussed with podiatry.  They will reevaluate wounds to see if patient requires additional debridement.  -Will monitor electrolytes and renal function with BMP and magnesium level in the AM  -Will monitor WBC and Hgb with CBC in the AM  -Clinical course will dictate further management     DVT Prophylaxis: Apixaban  GI Prophylaxis: Famotidine  Diet: Cardiac/fluid restriction/diabetic  Dispo: Social work actively working on placement  Code Status: Full code     Personally reviewed patients labs and imaging, discussed with patient and nurse at bedside. Discussed management with the following consultants: Podiatry.     Part of this note may be an electronic transcription/translation of spoken language to printed text using the Dragon dictation system.    DVT prophylaxis:  Medical DVT prophylaxis orders are present.    CODE STATUS:   Level Of Support Discussed  With: Patient  Code Status (Patient has no pulse and is not breathing): CPR (Attempt to Resuscitate)  Medical Interventions (Patient has pulse or is breathing): Full Support        Electronically signed by Joe Haynes MD, 06/13/23, 3:51 PM EDT.

## 2023-06-14 LAB
ANION GAP SERPL CALCULATED.3IONS-SCNC: 6.4 MMOL/L (ref 5–15)
BUN SERPL-MCNC: 9 MG/DL (ref 8–23)
BUN/CREAT SERPL: 15.5 (ref 7–25)
CALCIUM SPEC-SCNC: 8.7 MG/DL (ref 8.6–10.5)
CHLORIDE SERPL-SCNC: 102 MMOL/L (ref 98–107)
CO2 SERPL-SCNC: 29.6 MMOL/L (ref 22–29)
CREAT SERPL-MCNC: 0.58 MG/DL (ref 0.76–1.27)
DEPRECATED RDW RBC AUTO: 47.5 FL (ref 37–54)
EGFRCR SERPLBLD CKD-EPI 2021: 108.9 ML/MIN/1.73
ERYTHROCYTE [DISTWIDTH] IN BLOOD BY AUTOMATED COUNT: 16.5 % (ref 12.3–15.4)
GLUCOSE BLDC GLUCOMTR-MCNC: 146 MG/DL (ref 70–99)
GLUCOSE BLDC GLUCOMTR-MCNC: 147 MG/DL (ref 70–99)
GLUCOSE BLDC GLUCOMTR-MCNC: 158 MG/DL (ref 70–99)
GLUCOSE BLDC GLUCOMTR-MCNC: 206 MG/DL (ref 70–99)
GLUCOSE SERPL-MCNC: 174 MG/DL (ref 65–99)
HCT VFR BLD AUTO: 38.3 % (ref 37.5–51)
HGB BLD-MCNC: 11.5 G/DL (ref 13–17.7)
MAGNESIUM SERPL-MCNC: 1.7 MG/DL (ref 1.6–2.4)
MCH RBC QN AUTO: 23.8 PG (ref 26.6–33)
MCHC RBC AUTO-ENTMCNC: 30 G/DL (ref 31.5–35.7)
MCV RBC AUTO: 79.3 FL (ref 79–97)
PLATELET # BLD AUTO: 147 10*3/MM3 (ref 140–450)
PMV BLD AUTO: 10.8 FL (ref 6–12)
POTASSIUM SERPL-SCNC: 4.3 MMOL/L (ref 3.5–5.2)
RBC # BLD AUTO: 4.83 10*6/MM3 (ref 4.14–5.8)
SODIUM SERPL-SCNC: 138 MMOL/L (ref 136–145)
WBC NRBC COR # BLD: 4.59 10*3/MM3 (ref 3.4–10.8)

## 2023-06-14 PROCEDURE — 94799 UNLISTED PULMONARY SVC/PX: CPT

## 2023-06-14 PROCEDURE — 63710000001 INSULIN LISPRO (HUMAN) PER 5 UNITS: Performed by: INTERNAL MEDICINE

## 2023-06-14 PROCEDURE — 63710000001 INSULIN LISPRO (HUMAN) PER 5 UNITS

## 2023-06-14 PROCEDURE — 85027 COMPLETE CBC AUTOMATED: CPT | Performed by: INTERNAL MEDICINE

## 2023-06-14 PROCEDURE — 83735 ASSAY OF MAGNESIUM: CPT | Performed by: INTERNAL MEDICINE

## 2023-06-14 PROCEDURE — 82948 REAGENT STRIP/BLOOD GLUCOSE: CPT

## 2023-06-14 PROCEDURE — 63710000001 INSULIN DETEMIR PER 5 UNITS: Performed by: INTERNAL MEDICINE

## 2023-06-14 PROCEDURE — 99232 SBSQ HOSP IP/OBS MODERATE 35: CPT | Performed by: INTERNAL MEDICINE

## 2023-06-14 PROCEDURE — 97530 THERAPEUTIC ACTIVITIES: CPT

## 2023-06-14 PROCEDURE — 80048 BASIC METABOLIC PNL TOTAL CA: CPT | Performed by: INTERNAL MEDICINE

## 2023-06-14 PROCEDURE — 97162 PT EVAL MOD COMPLEX 30 MIN: CPT

## 2023-06-14 PROCEDURE — 25010000002 MAGNESIUM SULFATE 2 GM/50ML SOLUTION: Performed by: INTERNAL MEDICINE

## 2023-06-14 RX ORDER — MAGNESIUM SULFATE HEPTAHYDRATE 40 MG/ML
2 INJECTION, SOLUTION INTRAVENOUS ONCE
Status: COMPLETED | OUTPATIENT
Start: 2023-06-14 | End: 2023-06-14

## 2023-06-14 RX ORDER — INSULIN LISPRO 100 [IU]/ML
15 INJECTION, SOLUTION INTRAVENOUS; SUBCUTANEOUS
Status: DISCONTINUED | OUTPATIENT
Start: 2023-06-14 | End: 2023-06-15 | Stop reason: HOSPADM

## 2023-06-14 RX ADMIN — AMOXICILLIN 1000 MG: 500 CAPSULE ORAL at 21:09

## 2023-06-14 RX ADMIN — PREGABALIN 25 MG: 25 CAPSULE ORAL at 05:12

## 2023-06-14 RX ADMIN — METOPROLOL SUCCINATE 37.5 MG: 25 TABLET, EXTENDED RELEASE ORAL at 09:14

## 2023-06-14 RX ADMIN — Medication 10 ML: at 09:15

## 2023-06-14 RX ADMIN — APIXABAN 5 MG: 5 TABLET, FILM COATED ORAL at 21:09

## 2023-06-14 RX ADMIN — INSULIN DETEMIR 55 UNITS: 100 INJECTION, SOLUTION SUBCUTANEOUS at 21:09

## 2023-06-14 RX ADMIN — SIMETHICONE 80 MG: 80 TABLET, CHEWABLE ORAL at 05:12

## 2023-06-14 RX ADMIN — AMOXICILLIN 1000 MG: 500 CAPSULE ORAL at 05:12

## 2023-06-14 RX ADMIN — Medication 10 ML: at 21:08

## 2023-06-14 RX ADMIN — Medication 250 MG: at 21:09

## 2023-06-14 RX ADMIN — PREGABALIN 25 MG: 25 CAPSULE ORAL at 21:14

## 2023-06-14 RX ADMIN — APIXABAN 5 MG: 5 TABLET, FILM COATED ORAL at 09:14

## 2023-06-14 RX ADMIN — INSULIN LISPRO 15 UNITS: 100 INJECTION, SOLUTION INTRAVENOUS; SUBCUTANEOUS at 17:55

## 2023-06-14 RX ADMIN — SIMETHICONE 80 MG: 80 TABLET, CHEWABLE ORAL at 21:09

## 2023-06-14 RX ADMIN — ATORVASTATIN CALCIUM 10 MG: 10 TABLET, FILM COATED ORAL at 21:09

## 2023-06-14 RX ADMIN — PREGABALIN 25 MG: 25 CAPSULE ORAL at 14:06

## 2023-06-14 RX ADMIN — OXYCODONE HYDROCHLORIDE 10 MG: 5 TABLET ORAL at 09:22

## 2023-06-14 RX ADMIN — AMOXICILLIN 1000 MG: 500 CAPSULE ORAL at 14:06

## 2023-06-14 RX ADMIN — INSULIN LISPRO 15 UNITS: 100 INJECTION, SOLUTION INTRAVENOUS; SUBCUTANEOUS at 12:51

## 2023-06-14 RX ADMIN — DOCUSATE SODIUM 100 MG: 100 CAPSULE, LIQUID FILLED ORAL at 21:09

## 2023-06-14 RX ADMIN — MAGNESIUM SULFATE HEPTAHYDRATE 2 G: 2 INJECTION, SOLUTION INTRAVENOUS at 11:03

## 2023-06-14 RX ADMIN — SIMETHICONE 80 MG: 80 TABLET, CHEWABLE ORAL at 17:55

## 2023-06-14 RX ADMIN — OXYCODONE HYDROCHLORIDE 10 MG: 5 TABLET ORAL at 15:20

## 2023-06-14 RX ADMIN — INSULIN LISPRO 3 UNITS: 100 INJECTION, SOLUTION INTRAVENOUS; SUBCUTANEOUS at 09:15

## 2023-06-14 RX ADMIN — CALCIUM POLYCARBOPHIL 625 MG: 625 TABLET, FILM COATED ORAL at 09:14

## 2023-06-14 RX ADMIN — Medication 250 MG: at 09:14

## 2023-06-14 RX ADMIN — FAMOTIDINE 40 MG: 20 TABLET ORAL at 09:14

## 2023-06-14 RX ADMIN — DIGOXIN 125 MCG: 125 TABLET ORAL at 12:51

## 2023-06-14 RX ADMIN — INSULIN LISPRO 2 UNITS: 100 INJECTION, SOLUTION INTRAVENOUS; SUBCUTANEOUS at 17:56

## 2023-06-14 RX ADMIN — METOPROLOL SUCCINATE 37.5 MG: 25 TABLET, EXTENDED RELEASE ORAL at 21:09

## 2023-06-14 RX ADMIN — SIMETHICONE 80 MG: 80 TABLET, CHEWABLE ORAL at 12:51

## 2023-06-14 RX ADMIN — INSULIN DETEMIR 52 UNITS: 100 INJECTION, SOLUTION SUBCUTANEOUS at 09:15

## 2023-06-14 RX ADMIN — INSULIN LISPRO 12 UNITS: 100 INJECTION, SOLUTION INTRAVENOUS; SUBCUTANEOUS at 09:15

## 2023-06-14 RX ADMIN — OXYCODONE HYDROCHLORIDE 10 MG: 5 TABLET ORAL at 00:35

## 2023-06-14 NOTE — THERAPY EVALUATION
Acute Care - Physical Therapy Re-Evaluation   [facility]     Patient Name: Jose Shaikh  : 1960  MRN: [id number]  Today's Date: 2023      Visit Dx:     ICD-10-CM ICD-9-CM   1. Acute febrile illness  R50.9 780.60   2. Sepsis without acute organ dysfunction, due to unspecified organism  A41.9 038.9     995.91   3. Pneumonia of right upper lobe due to infectious organism  J18.9 486   4. Uncontrolled type 2 diabetes mellitus with hyperglycemia  E11.65 250.02   5. Atrial fibrillation with rapid ventricular response  I48.91 427.31   6. Acute osteomyelitis of right foot  M86.171 730.07   7. Decreased activities of daily living (ADL)  Z78.9 V49.89   8. Difficulty in walking  R26.2 719.7     Patient Active Problem List   Diagnosis    Diabetic ulcer of left heel associated with type 2 DM    Acute osteomyelitis of left calcaneus     Diabetic ulcer of left heel associated with type 2 DM    Diabetic ulcer of right midfoot associated with type 2 DM    Paroxysmal atrial fibrillation    Essential hypertension    Hyperlipidemia LDL goal <100    Cellulitis and abscess of foot    High alkaline phosphatase level    Osteomyelitis    Onychomycosis    Onychocryptosis    Foot pain, bilateral    Osteomyelitis of foot, right, acute    Cellulitis of right foot    Type 2 diabetes mellitus, with long-term current use of insulin    Class 3 severe obesity due to excess calories with serious comorbidity and body mass index (BMI) of 45.0 to 49.9 in adult    Anxiety disorder, unspecified    Claustrophobia    Dependence on wheelchair    Depression, unspecified    Long term (current) use of anticoagulants    Long term (current) use of oral hypoglycemic drugs    Wound of foot    Non-prs chronic ulcer oth prt r foot limited to brkdwn skin    Orthostatic hypotension    Other chronic osteomyelitis, right ankle and foot    Personal history of nicotine dependence    Thrombocytopenia, unspecified    Unspecified open wound, right foot, initial  encounter    Diabetic foot infection    Subacute osteomyelitis of right foot    Right foot pain    Sepsis    Onychomycosis    Foot pain, left     Past Medical History:   Diagnosis Date    Absence of toe of right foot     Acute osteomyelitis of left calcaneus  8/18/2021    Anxiety and depression     Arthritis     Claustrophobia     Corns and callus     Diabetic ulcer of left heel associated with type 2 DM 8/18/2021    Diabetic ulcer of left heel associated with type 2 DM 7/6/2021    Diabetic ulcer of right midfoot associated with type 2 DM 8/18/2021    Difficulty walking     Essential hypertension 8/31/2021    Hammertoe     Hyperlipidemia LDL goal <100 8/31/2021    Ingrown toenail     Obesity     Paroxysmal atrial fibrillation 8/31/2021    Polyneuropathy     Pressure ulcer, stage 1     Tinea unguium     Type 2 diabetes mellitus with polyneuropathy      Past Surgical History:   Procedure Laterality Date    CYST REMOVAL      center of back; benign    INCISION AND DRAINAGE ABSCESS      back    INCISION AND DRAINAGE LEG Right 12/10/2021    Procedure: INCISION AND DRAINAGE LOWER EXTREMITY;  Surgeon: Ash Leyva DPM;  Location: [facility];  Service: Podiatry;  Laterality: Right;    OTHER SURGICAL HISTORY      Surgical clips left foot    TOE SURGERY Right     Removal of 5th toe    TRANS METATARSAL AMPUTATION Right 12/2/2021    Procedure: AMPUTATION TRANS METATARSAL;  Surgeon: Ash Leyva DPM;  Location: [facility] OR;  Service: Podiatry;  Laterality: Right;    WRIST SURGERY Left     repair of injury     PT Assessment (last 12 hours)       PT Evaluation and Treatment       Row Name 06/14/23 1000          Physical Therapy Time and Intention    Subjective Information complains of;weakness (P)   -MB     Document Type re-evaluation (P)   -MB     Mode of Treatment individual therapy;physical therapy  -AV     Patient Effort good (P)   -MB     Symptoms Noted During/After Treatment fatigue (P)   -MB        Row Name 06/14/23 1000          Mobility    Extremity Weight-bearing Status left lower extremity;right lower extremity (P)   -MB     Left Lower Extremity (Weight-bearing Status) non weight-bearing (NWB) (P)   -MB     Right Lower Extremity (Weight-bearing Status) non weight-bearing (NWB) (P)   -MB     Additional Documentation -- (P)   If necessary pt can weight bear on the left forefoot and the right heel.  -MB       Row Name 06/14/23 1000          Bed Mobility    Bed Mobility sit-supine;supine-sit;scooting/bridging (P)   -MB     Scooting/Bridging [place] (Bed Mobility) minimum assist (75% patient effort) (P)   -MB     Supine-Sit [place] (Bed Mobility) standby assist (P)   -MB     Sit-Supine [place] (Bed Mobility) minimum assist (75% patient effort) (P)   -MB     Bed Mobility, Safety Issues decreased use of arms for pushing/pulling;decreased use of legs for bridging/pushing (P)   -MB     Assistive Device (Bed Mobility) bed rails (P)   -MB       Row Name 06/14/23 1000          Transfers    Transfers sit-stand transfer;stand-sit transfer (P)   -MB     Maintains Weight-bearing Status (Transfers) unable to maintain weight-bearing status (P)   -MB       Row Name 06/14/23 1000          Sit-Stand Transfer    Sit-Stand [place] (Transfers) standby assist (P)   -MB     Assistive Device (Sit-Stand Transfers) walker, front-wheeled (P)   -MB     Comment, (Sit-Stand Transfer) Pt made aware of WB precautions and importance of using gait belt. Pt refused belt and stood in spite of warning for 3-4 seconds, Pt had difficulty maintaining WB status and returned to sitting, Pt verbally acknowledged usefulness of gait belt after further discussion. (P)   -MB       Row Name 06/14/23 1000          Stand-Sit Transfer    Stand-Sit [place] (Transfers) standby assist (P)   -MB     Assistive Device (Stand-Sit Transfers) walker, front-wheeled (P)   -MB       Row Name 06/14/23 1000          Safety Issues, Functional  Mobility    Safety Issues Affecting Function (Mobility) impulsivity;judgment;safety precaution awareness;unable to maintain weight-bearing restrictions;safety precautions follow-through/compliance (P)   -MB     Impairments Affecting Function (Mobility) balance;endurance/activity tolerance;strength (P)   -MB     Comment, Safety Issues/Impairments (Mobility) Patient is fearful of falling. (P)   -MB       Row Name 06/14/23 1000          Balance    Balance Assessment standing static balance;sitting dynamic balance (P)   -MB     Dynamic Sitting Balance standby assist (P)   -MB     Static Standing Balance unable to assess (P)   See comments under sit-to-stand transfer.  -MB       Row Name 06/14/23 1000          Motor Skills    Therapeutic Exercise other (see comments) (P)   Pt performed BLE exercises in supine including ankle pumps and hip ab/ad x20 ea.  -MB       Row Name             Wound 12/02/21 Right anterior foot Incision    Wound - Properties Group Placement Date: 12/02/21  -ES Side: Right  -ES Orientation: anterior  -ES Location: foot  -ES Primary Wound Type: Incision  -ES    Retired Wound - Properties Group Placement Date: 12/02/21  -ES Side: Right  -ES Orientation: anterior  -ES Location: foot  -ES Primary Wound Type: Incision  -ES    Retired Wound - Properties Group Date first assessed: 12/02/21  -ES Side: Right  -ES Location: foot  -ES Primary Wound Type: Incision  -ES      Row Name             Wound 06/22/21 1133 Left lateral heel    Wound - Properties Group Placement Date: 06/22/21  -FABIOLA Placement Time: 1133  -FABIOLA Present on Hospital Admission: Y  -FABIOLA Side: Left  -FABIOLA Orientation: lateral  -FABIOLA Location: heel  -FABIOLA    Retired Wound - Properties Group Placement Date: 06/22/21  -FABIOLA Placement Time: 1133  -FABIOLA Present on Hospital Admission: Y  -FABIOLA Side: Left  -FABIOLA Orientation: lateral  -FABIOLA Location: heel  -FABIOLA    Retired Wound - Properties Group Date first assessed: 06/22/21  -FABIOLA Time first assessed: 1133  -FABIOLA Present  on Hospital Admission: Y  -FABIOLA Side: Left  -FABIOLA Location: heel  -FABIOLA      Row Name             Wound 05/19/23 1317 gluteal Blisters    Wound - Properties Group Placement Date: 05/19/23  -JP Placement Time: 1317 -JP Present on Hospital Admission: N  -RASHARD Location: gluteal  -RASHARD Primary Wound Type: Blisters  -RASHARD    Retired Wound - Properties Group Placement Date: 05/19/23  -JP Placement Time: 1317  -RASHARD Present on Hospital Admission: N  -RASHARD Location: gluteal  -RASHARD Primary Wound Type: Blisters  -RASHARD    Retired Wound - Properties Group Date first assessed: 05/19/23  -JP Time first assessed: 1317  -RASHARD Present on Hospital Admission: N  -RASHARD Location: gluteal  -RASHARD Primary Wound Type: Blisters  -RASHARD      Row Name 06/14/23 1000          Plan of Care Review    Plan of Care Reviewed With patient (P)   -MB     Progress no change (P)   -MB     Outcome Evaluation Pt presents with deficits in gait, functional transfers, and strength. He has not made significant progress toward meeting his goals as of yet. He will continue to benefit from skilled PT intervention. Recommend IPR following discharge. (P)   -MB       Row Name 06/14/23 1000          Positioning and Restraints    Pre-Treatment Position in bed (P)   -MB     Post Treatment Position bed (P)   -MB     In Bed supine (P)   -MB       Row Name 06/14/23 1000          Physical Therapy Goals    Bed Mobility Goal Selection (PT) bed mobility, PT goal 1 (P)   -MB     Transfer Goal Selection (PT) transfer, PT goal 1 (P)   -MB       Row Name 06/14/23 1000          Bed Mobility Goal 1 (PT)    Activity/Assistive Device (Bed Mobility Goal 1, PT) bed mobility activities, all  -AV     [place] Level/Cues Needed (Bed Mobility Goal 1, PT) minimum assist (75% or more patient effort)  -AV     Time Frame (Bed Mobility Goal 1, PT) 10 days  -AV     Progress/Outcomes (Bed Mobility Goal 1, PT) progress slower than expected (P)   -MB       Row Name 06/14/23 1000          Transfer Goal 1 (PT)     Activity/Assistive Device (Transfer Goal 1, PT) bed-to-chair/chair-to-bed;sliding board  -AV     [place] Level/Cues Needed (Transfer Goal 1, PT) maximum assist (25-49% patient effort)  -AV     Time Frame (Transfer Goal 1, PT) long term goal (LTG);10 days  -AV     Progress/Outcome (Transfer Goal 1, PT) progress slower than expected (P)   -MB               User Key  (r) = Recorded By, (t) = Taken By, (c) = Cosigned By      Initials Name Provider Type    Karon Jordan, RN Registered Nurse    Marlen Vaughn, RN Registered Nurse    Frankie Brunson, PT Physical Therapist    Katlyn Garcia RN Registered Nurse    Gilbert Bennett, PT Student PT Student                      PT Recommendation and Plan         Outcome Measures       Row Name 06/13/23 1113 06/12/23 1100          How much help from another person do you currently need...    Turning from your back to your side while in flat bed without using bedrails? 3  -DK 3  -AV (r) MB (t) AV (c)     Moving from lying on back to sitting on the side of a flat bed without bedrails? 3  -DK 3  -AV (r) MB (t) AV (c)     Moving to and from a bed to a chair (including a wheelchair)? 2  -DK 2  -AV (r) MB (t) AV (c)     Standing up from a chair using your arms (e.g., wheelchair, bedside chair)? 2  -DK 2  -AV (r) MB (t) AV (c)     Climbing 3-5 steps with a railing? 1  -DK 1  -AV (r) MB (t) AV (c)     To walk in hospital room? 1  -DK 1  -AV (r) MB (t) AV (c)     AM-PAC 6 Clicks Score (PT) 12  -DK 12  -AV (r) MB (t)        Functional Assessment    Outcome Measure Options AM-PAC 6 Clicks Basic Mobility (PT)  -DK AM-PAC 6 Clicks Basic Mobility (PT)  -AV (r) MB (t) AV (c)               User Key  (r) = Recorded By, (t) = Taken By, (c) = Cosigned By      Initials Name Provider Type    Mckenna Landaverde, PTA Physical Therapist Assistant    Frankie Brunson, PT Physical Therapist    Gilbert Bennett, PT Student PT Student                     Time Calculation:    PT  Charges       Row Name 06/14/23 1004             Time Calculation    PT Received On 06/14/23 (P)   -MB      PT Goal Re-Cert Due Date 06/23/23 (P)   -MB         Timed Charges    81765 - PT Therapeutic Exercise Minutes 5 (P)   -MB      95150 - PT Therapeutic Activity Minutes 10 (P)   -MB         Untimed Charges    PT Eval/Re-eval Minutes 36 (P)   -MB         Total Minutes    Timed Charges Total Minutes 15 (P)   -MB      Untimed Charges Total Minutes 36 (P)   -MB       Total Minutes 51 (P)   -MB                User Key  (r) = Recorded By, (t) = Taken By, (c) = Cosigned By      Initials Name Provider Type    Gilbert Bennett, PT Student PT Student                        PT G-Codes  Outcome Measure Options: AM-PAC 6 Clicks Basic Mobility (PT)  AM-PAC 6 Clicks Score (PT): 12  AM-PAC 6 Clicks Score (OT): 17    Frankie Ospina, PT  6/14/2023

## 2023-06-14 NOTE — PROGRESS NOTES
[facility]   Hospitalist Progress Note  Date: 2023  Patient Name: Jose Shaikh  : 1960  MRN: [id number]  Date of admission: 2023  Consultants:   -Podiatry: Dr. Ash Leyva    Subjective   Subjective     Chief Complaint: Hip and shoulder pain    Summary:   Jose Shaikh is a 62 y.o. male with history of osteomyelitis, type 2 diabetes mellitus, obesity (BMI: 48.12), paroxysmal atrial fibrillation and recurrent/chronic foot infections who presented with complaints of worsening hip pain.  Of note, patient had been admitted to hospital in early 2023 for foot infection.  At that time there was recommendation for amputation due to osteomyelitis in the foot but the patient refused.  Placement attempted at that time but unsuccessful as patient is a sex offender and makes placement difficult.  He was treated with oral antibiotics with doxycycline and ciprofloxacin for Alcaligenes facialis and Morganella morganii after last admission.  He follows with wound care/Dr. Canales and attention made to switch antibiotics to Levaquin per pharmacy recommendation based on culture data but there is difficulty getting a hold of the patient therefore antibiotics were never switched.  During this admission treatment for right foot osteomyelitis and bacteremia initiated with 6-week course of amoxicillin per ID.  For hip pain, x-ray pelvis and hip obtained and showed DJD of bilateral hips left worse than right.  MRI was refused by the patient due to claustrophobia.  Symptoms stabilized and patient intermittently working with PT services.    Interval Followup:   No acute events overnight.  Patient denied any active chest pain or shortness of breath.  Did endorse some knee discomfort when working with therapy.  Nursing with no additional acute issues to report.    Antibiotics:   -Amoxicillin    Pain Medication:   -Oxycodone    Review of Systems   All systems reviewed and negative unless stated otherwise under  subjective.    Objective   Objective     Vitals:   Temp:  [97.5 °F (36.4 °C)-98.6 °F (37 °C)] 98.6 °F (37 °C)  Heart Rate:  [71-81] 81  Resp:  [16-18] 18  BP: (115-137)/(67-77) 137/68  Physical Exam   Gen: No acute distress, Conversant, lying in bed, alert  Resp: CTAB, No w/r/r, good aeration, equal chest rise bilaterally  Card: RRR, No m/r/g  Abd: Soft, Nontender, Nondistended, + bowel sounds    Result Review    Result Review:  I have personally reviewed the results as below and agree with these findings:  []  Laboratory:   CMP          6/6/2023    04:52 6/8/2023    05:20 6/14/2023    06:04   CMP   Glucose 217  215  174    BUN 11  9  9    Creatinine 0.59  0.52  0.58    EGFR 108.3  112.6  108.9    Sodium 138  136  138    Potassium 3.8  4.2  4.3    Chloride 101  102  102    Calcium 8.8  8.4  8.7    Total Protein 6.9      Albumin 2.7      Globulin 4.2      Total Bilirubin 0.6      Alkaline Phosphatase 321      AST (SGOT) 32      ALT (SGPT) 18      Albumin/Globulin Ratio 0.6      BUN/Creatinine Ratio 18.6  17.3  15.5    Anion Gap 7.3  8.1  6.4      CBC          6/6/2023    04:52 6/8/2023    05:20 6/14/2023    06:04   CBC   WBC 3.62  4.01  4.59    RBC 4.61  4.50  4.83    Hemoglobin 11.3  10.7  11.5    Hematocrit 36.7  36.0  38.3    MCV 79.6  80.0  79.3    MCH 24.5  23.8  23.8    MCHC 30.8  29.7  30.0    RDW 16.5  16.4  16.5    Platelets 152  152  147      Magnesium low.  Blood glucose elevated.    []  Microbiology:   []  Radiology:   []  EKG/Telemetry:    []  Cardiology/Vascular:    []  Pathology:  []  Old records:  []  Other:    Assessment & Plan   Assessment / Plan     Assessment:  Sepsis poa 2/2 pna, bacteremia  RUL healthcare associated pneumonia  Streptococcus agalactiae bacteremia  Chronic osteomyelitis of right foot involving cuboid and maybe cuneiform.  On p.o. amoxicillin for 6 weeks  Cellulitis of right foot due to Streptococcus agalactiae.  Paroxysmal A-fib with intermittent RVR on Eliquis.  Heart rates have  been well controlled  Bilateral diabetic foot ulcers.  NIDDM.  Most recent A1c 6.6 in April 2023  Morbid obesity BMI of 44.8, down from 48.1 on admission  S/p TMA of right foot.  Hyponatremia.  likely from hyperglycemia/volume overload.  Clinically significant.  Resolved.  Hypophosphatemia.  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  Chronic shoulder neck pain  Hypertension.  Blood pressure stable.  Diarrhea most likely antibiotic associated  Hemorrhoids, nonbleeding  Prolonged hospitalization, admitted 5/12/23    Plan:  -Podiatry consulted.  Wound evaluated by wound care service who noted that left amputation site and thickened tissue to cover the wound there is some concern for masking of the ulceration.  Podiatry recontacted for evaluation  -Continue amoxicillin with plan to treat for total of 6 weeks  -Continue pain management as needed oxycodone  -Given hyperglycemia increase Levemir and scheduled Humalog dosing.  Monitor blood glucose level and SSI requirement and make additional adjustments to regimen accordingly  -Replace magnesium IV  -Continue digoxin, metoprolol at current rate  -Continue apixaban  -PT/OT consulted and following  -Management discussed with podiatry.  Plan to reevaluate wounds and see if patient requires additional debridement.  -Monitor electrolytes and renal function with BMP, phosphorus level and magnesium level in the AM  -Clinical course will dictate further management     DVT Prophylaxis: Apixaban  GI Prophylaxis: Famotidine  Diet: Cardiac/fluid restriction/diabetic  Dispo: Social work actively working on placement  Code Status: Full code     Personally reviewed patients labs and imaging, discussed with patient and nurse at bedside. Discussed management with the following consultants: Podiatry.     Part of this note may be an electronic transcription/translation of spoken language to printed text using the Dragon dictation system.    DVT  prophylaxis:  Medical DVT prophylaxis orders are present.    CODE STATUS:   Level Of Support Discussed With: Patient  Code Status (Patient has no pulse and is not breathing): CPR (Attempt to Resuscitate)  Medical Interventions (Patient has pulse or is breathing): Full Support      Electronically signed by Joe Haynes MD, 06/14/23, 9:47 AM EDT.

## 2023-06-14 NOTE — PLAN OF CARE
Goal Outcome Evaluation:                 Patient remains alert and oriented x4. Medicated with PRN pain medication per MAR. Wound and skin care performed as ordered. No new issues at this time.

## 2023-06-15 ENCOUNTER — READMISSION MANAGEMENT (OUTPATIENT)
Dept: CALL CENTER | Facility: HOSPITAL | Age: 63
End: 2023-06-15
Payer: MEDICARE

## 2023-06-15 VITALS
RESPIRATION RATE: 18 BRPM | DIASTOLIC BLOOD PRESSURE: 58 MMHG | HEART RATE: 71 BPM | TEMPERATURE: 98.2 F | WEIGHT: 315 LBS | BODY MASS INDEX: 40.43 KG/M2 | OXYGEN SATURATION: 96 % | SYSTOLIC BLOOD PRESSURE: 115 MMHG | HEIGHT: 74 IN

## 2023-06-15 LAB
ANION GAP SERPL CALCULATED.3IONS-SCNC: 3.5 MMOL/L (ref 5–15)
BUN SERPL-MCNC: 9 MG/DL (ref 8–23)
BUN/CREAT SERPL: 17.6 (ref 7–25)
CALCIUM SPEC-SCNC: 8.7 MG/DL (ref 8.6–10.5)
CHLORIDE SERPL-SCNC: 100 MMOL/L (ref 98–107)
CO2 SERPL-SCNC: 29.5 MMOL/L (ref 22–29)
CREAT SERPL-MCNC: 0.51 MG/DL (ref 0.76–1.27)
EGFRCR SERPLBLD CKD-EPI 2021: 113.2 ML/MIN/1.73
GLUCOSE BLDC GLUCOMTR-MCNC: 185 MG/DL (ref 70–99)
GLUCOSE BLDC GLUCOMTR-MCNC: 225 MG/DL (ref 70–99)
GLUCOSE SERPL-MCNC: 228 MG/DL (ref 65–99)
MAGNESIUM SERPL-MCNC: 1.8 MG/DL (ref 1.6–2.4)
PHOSPHATE SERPL-MCNC: 3.1 MG/DL (ref 2.5–4.5)
POTASSIUM SERPL-SCNC: 4.4 MMOL/L (ref 3.5–5.2)
SODIUM SERPL-SCNC: 133 MMOL/L (ref 136–145)

## 2023-06-15 PROCEDURE — 99239 HOSP IP/OBS DSCHRG MGMT >30: CPT | Performed by: INTERNAL MEDICINE

## 2023-06-15 PROCEDURE — 63710000001 INSULIN LISPRO (HUMAN) PER 5 UNITS: Performed by: INTERNAL MEDICINE

## 2023-06-15 PROCEDURE — 63710000001 INSULIN DETEMIR PER 5 UNITS: Performed by: INTERNAL MEDICINE

## 2023-06-15 PROCEDURE — 84100 ASSAY OF PHOSPHORUS: CPT | Performed by: INTERNAL MEDICINE

## 2023-06-15 PROCEDURE — 63710000001 INSULIN LISPRO (HUMAN) PER 5 UNITS

## 2023-06-15 PROCEDURE — 83735 ASSAY OF MAGNESIUM: CPT | Performed by: INTERNAL MEDICINE

## 2023-06-15 PROCEDURE — 80048 BASIC METABOLIC PNL TOTAL CA: CPT | Performed by: INTERNAL MEDICINE

## 2023-06-15 PROCEDURE — 97110 THERAPEUTIC EXERCISES: CPT

## 2023-06-15 PROCEDURE — 82948 REAGENT STRIP/BLOOD GLUCOSE: CPT

## 2023-06-15 RX ORDER — DIGOXIN 125 MCG
125 TABLET ORAL
Qty: 30 TABLET | Refills: 0 | Status: ON HOLD | OUTPATIENT
Start: 2023-06-15 | End: 2023-07-15

## 2023-06-15 RX ORDER — NALOXONE HYDROCHLORIDE 4 MG/.1ML
SPRAY NASAL
Qty: 2 EACH | Refills: 0 | Status: ON HOLD | OUTPATIENT
Start: 2023-06-15

## 2023-06-15 RX ORDER — PREGABALIN 25 MG/1
25 CAPSULE ORAL 3 TIMES DAILY
Qty: 9 CAPSULE | Refills: 0 | Status: ON HOLD | OUTPATIENT
Start: 2023-06-15 | End: 2023-06-18

## 2023-06-15 RX ORDER — FAMOTIDINE 40 MG/1
40 TABLET, FILM COATED ORAL DAILY
Qty: 30 TABLET | Refills: 0 | Status: ON HOLD | OUTPATIENT
Start: 2023-06-16 | End: 2023-07-16

## 2023-06-15 RX ORDER — METOPROLOL SUCCINATE 25 MG/1
37.5 TABLET, EXTENDED RELEASE ORAL 2 TIMES DAILY
Qty: 90 TABLET | Refills: 0 | Status: ON HOLD | OUTPATIENT
Start: 2023-06-15 | End: 2023-07-15

## 2023-06-15 RX ORDER — INSULIN DETEMIR 100 [IU]/ML
60 INJECTION, SOLUTION SUBCUTANEOUS 2 TIMES DAILY
Qty: 30 ML | Refills: 0 | Status: ON HOLD | OUTPATIENT
Start: 2023-06-15 | End: 2023-07-15

## 2023-06-15 RX ORDER — AMOXICILLIN 500 MG/1
1000 CAPSULE ORAL EVERY 8 HOURS SCHEDULED
Qty: 58 CAPSULE | Refills: 0 | Status: ON HOLD | OUTPATIENT
Start: 2023-06-15 | End: 2023-06-25

## 2023-06-15 RX ORDER — DOCUSATE SODIUM 100 MG/1
100 CAPSULE, LIQUID FILLED ORAL 2 TIMES DAILY
Qty: 60 CAPSULE | Refills: 0 | Status: ON HOLD | OUTPATIENT
Start: 2023-06-15 | End: 2023-07-15

## 2023-06-15 RX ORDER — OXYCODONE HYDROCHLORIDE 10 MG/1
10 TABLET ORAL EVERY 6 HOURS PRN
Qty: 12 TABLET | Refills: 0 | Status: ON HOLD | OUTPATIENT
Start: 2023-06-15 | End: 2023-06-18

## 2023-06-15 RX ADMIN — AMOXICILLIN 1000 MG: 500 CAPSULE ORAL at 05:06

## 2023-06-15 RX ADMIN — OXYCODONE HYDROCHLORIDE 10 MG: 5 TABLET ORAL at 08:26

## 2023-06-15 RX ADMIN — INSULIN LISPRO 3 UNITS: 100 INJECTION, SOLUTION INTRAVENOUS; SUBCUTANEOUS at 08:26

## 2023-06-15 RX ADMIN — INSULIN LISPRO 15 UNITS: 100 INJECTION, SOLUTION INTRAVENOUS; SUBCUTANEOUS at 08:25

## 2023-06-15 RX ADMIN — FAMOTIDINE 40 MG: 20 TABLET ORAL at 08:26

## 2023-06-15 RX ADMIN — SIMETHICONE 80 MG: 80 TABLET, CHEWABLE ORAL at 06:42

## 2023-06-15 RX ADMIN — APIXABAN 5 MG: 5 TABLET, FILM COATED ORAL at 08:26

## 2023-06-15 RX ADMIN — OXYCODONE HYDROCHLORIDE 10 MG: 5 TABLET ORAL at 01:34

## 2023-06-15 RX ADMIN — INSULIN LISPRO 2 UNITS: 100 INJECTION, SOLUTION INTRAVENOUS; SUBCUTANEOUS at 13:17

## 2023-06-15 RX ADMIN — CALCIUM POLYCARBOPHIL 625 MG: 625 TABLET, FILM COATED ORAL at 08:26

## 2023-06-15 RX ADMIN — PREGABALIN 25 MG: 25 CAPSULE ORAL at 05:06

## 2023-06-15 RX ADMIN — Medication 250 MG: at 08:27

## 2023-06-15 RX ADMIN — INSULIN DETEMIR 55 UNITS: 100 INJECTION, SOLUTION SUBCUTANEOUS at 08:25

## 2023-06-15 RX ADMIN — PREGABALIN 25 MG: 25 CAPSULE ORAL at 13:17

## 2023-06-15 RX ADMIN — DIGOXIN 125 MCG: 125 TABLET ORAL at 13:17

## 2023-06-15 RX ADMIN — SIMETHICONE 80 MG: 80 TABLET, CHEWABLE ORAL at 13:17

## 2023-06-15 RX ADMIN — AMOXICILLIN 1000 MG: 500 CAPSULE ORAL at 13:18

## 2023-06-15 RX ADMIN — Medication 10 ML: at 08:27

## 2023-06-15 RX ADMIN — METOPROLOL SUCCINATE 37.5 MG: 25 TABLET, EXTENDED RELEASE ORAL at 08:26

## 2023-06-15 RX ADMIN — INSULIN LISPRO 15 UNITS: 100 INJECTION, SOLUTION INTRAVENOUS; SUBCUTANEOUS at 13:17

## 2023-06-15 RX ADMIN — DOCUSATE SODIUM 100 MG: 100 CAPSULE, LIQUID FILLED ORAL at 08:26

## 2023-06-15 NOTE — DISCHARGE SUMMARY
[facility]         HOSPITALIST  DISCHARGE SUMMARY    Patient Name: Jose Shaikh  : 1960  MRN: [id number]    Date of Admission: 2023  Date of Discharge:  06/15/23  Primary Care Physician: Delia Cervantes, VALENTÍN    Consultants:  -Podiatry: Dr. Aleman [facility] Problems:  Sepsis poa 2/2 pna, bacteremia  RUL healthcare associated pneumonia  Streptococcus agalactiae bacteremia  Chronic osteomyelitis of right foot involving cuboid and maybe cuneiform.  On p.o. amoxicillin for 6 weeks  Cellulitis of right foot due to Streptococcus agalactiae.  Paroxysmal A-fib with intermittent RVR on Eliquis.  Heart rates have been well controlled  Bilateral diabetic foot ulcers.  NIDDM.  Most recent A1c 6.6 in 2023  Morbid obesity BMI of 44.8, down from 48.1 on admission  S/p TMA of right foot.  Hyponatremia.  likely from hyperglycemia/volume overload.  Clinically significant.  Resolved.  Hypophosphatemia.  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  Chronic shoulder neck pain  Hypertension.  Blood pressure stable.  Diarrhea most likely antibiotic associated  Hemorrhoids, nonbleeding  Prolonged hospitalization, admitted 23    Hospital Course     Hospital Course:  Jose Shaikh is a 62 y.o. male with history of osteomyelitis, type 2 diabetes mellitus, obesity (BMI: 48.12), paroxysmal atrial fibrillation and recurrent/chronic foot infections who presented with complaints of worsening hip pain. Of note, patient had been admitted to hospital in early 2023 for foot infection. At that time there was recommendation for amputation due to osteomyelitis in the foot but the patient refused. Placement attempted at that time but unsuccessful as patient is a sex offender and makes placement difficult. He was treated with oral antibiotics with doxycycline and ciprofloxacin for Alcaligenes facialis and Morganella morganii after last admission. He follows with  wound care/Dr. Daniels and decision made to switch antibiotics to Levaquin per pharmacy recommendation based on culture data but there is difficulty getting a hold of the patient therefore antibiotics were never switched. During this admission treatment for right foot osteomyelitis and bacteremia initiated with 6-week course of amoxicillin per ID. For hip pain, x-ray pelvis and hip obtained and showed DJD of bilateral hips left worse than right. MRI was refused by the patient due to claustrophobia. Symptoms stabilized and patient intermittently working with PT services.  Multiple attempts made to get patient accepted at rehab facility but these were unsuccessful as patient would best intermittently work with physical therapy and continue to refuse transfers and ambulation attempts.  Importance of doing the things was stressed at length with the patient numerous times who voiced understanding.  Given patient's medical comorbidities and debility rehab placement would have been preferred but due to patient's lack of compliance and participation in his own therapy rehab placement was not able to be obtained.  Patient set up with home health prior to discharge home.  Patient is very high risk for readmission especially since patient was not able to be placed at rehab facility and will be discharging home with home health.  Again, all attempts were made to get rehab placement for the patient but due to his lack of participation with therapy even after multiple discussions rehab placement was unsuccessful.  On day of discharge patient hemodynamically stable and no additional inpatient evaluation or work-up necessary at this time.    DISCHARGE Follow Up Recommendations for labs and diagnostics:   -Follow-up with PCP in 3 to 5 days  -Patient needs to follow-up in wound care clinic in 1-2 weeks  -Podiatry follow-up in 1-2 weeks    Day of Discharge     Vital Signs:  Temp:  [97.5 °F (36.4 °C)-98.6 °F (37 °C)] 98.2 °F (36.8  °C)  Heart Rate:  [71-77] 71  Resp:  [16-18] 18  BP: (106-121)/(53-80) 115/58  Physical Exam:   Gen: No acute distress, alert, conversant, lying in bed  Resp: CTAB, No w/r/r, normal respiratory effort  Card: RRR, No m/r/g  Abd: Soft, Nontender, Nondistended, + bowel sounds     Discharge Details        Discharge Medications        New Medications        Instructions Start Date   amoxicillin 500 MG capsule  Commonly known as: AMOXIL   1,000 mg, Oral, Every 8 Hours Scheduled      digoxin 125 MCG tablet  Commonly known as: LANOXIN   125 mcg, Oral, Daily Digoxin      docusate sodium 100 MG capsule   100 mg, Oral, 2 Times Daily      famotidine 40 MG tablet  Commonly known as: PEPCID   40 mg, Oral, Daily   Start Date: June 16, 2023     naloxone 4 MG/0.1ML nasal spray  Commonly known as: NARCAN   Call 911. Don't prime. [place] in 1 nostril for overdose. Repeat in 2-3 minutes in other nostril if no or minimal breathing/responsiveness.      oxyCODONE 10 MG tablet  Commonly known as: ROXICODONE   10 mg, Oral, Every 6 Hours PRN      pregabalin 25 MG capsule  Commonly known as: LYRICA   25 mg, Oral, 3 Times Daily             Changes to Medications        Instructions Start Date   Levemir FlexTouch 100 UNIT/ML injection  Generic drug: insulin detemir  What changed: when to take this   60 Units, Subcutaneous, 2 Times Daily      metoprolol succinate XL 25 MG 24 hr tablet  Commonly known as: TOPROL-XL  What changed:   medication strength  how much to take  when to take this   37.5 mg, Oral, 2 Times Daily             Continue These Medications        Instructions Start Date   acetaminophen 650 MG 8 hr tablet  Commonly known as: TYLENOL   650 mg, Oral, Every 8 Hours PRN      apixaban 5 MG tablet tablet  Commonly known as: ELIQUIS   5 mg, Oral, Every 12 Hours Scheduled      Dulaglutide 3 MG/0.5ML solution pen-injector   0.5 mg, Subcutaneous, Weekly      fluticasone 50 MCG/ACT nasal spray  Commonly known as: FLONASE   2 sprays, Nasal,  Daily      HumaLOG KwikPen 200 UNIT/ML solution pen-injector  Generic drug: Insulin Lispro   30 Units, Subcutaneous, 3 Times Daily Before Meals      metFORMIN 1000 MG tablet  Commonly known as: GLUCOPHAGE   1,000 mg, Oral, 2 Times Daily With Meals      simvastatin 20 MG tablet  Commonly known as: ZOCOR   20 mg, Oral, Nightly             Stop These Medications      ciprofloxacin 500 MG tablet  Commonly known as: CIPRO     doxycycline 100 MG capsule  Commonly known as: VIBRAMYCIN     gabapentin 300 MG capsule  Commonly known as: NEURONTIN     lisinopril 20 MG tablet  Commonly known as: PRINIVIL,ZESTRIL     meloxicam 7.5 MG tablet  Commonly known as: MOBIC              Allergies   Allergen Reactions   • Adhesive Tape Rash     The patient's mobility limitation impairs ability to participate in all ADL's. Mobility limitation cannot be resolved by a cane or walker. Patient can safely and independently maneuver the wheelchair and the home provides adequate access between rooms. Use of wheelchair will improve patient's ability to participate in MRADL's and patient will be using inside the home. A caregiver is available if patient does not have the upper body strength and mental capacity to self-propel a wheelchair inside the home. Due to patient's BMI he will require a bariatric wheelchair.    The patient needs a bedside commode at home for the following reason: the patient is confined to a single room.        Discharge Disposition:  Home-Health Care [facility]    Diet:  Hospital:  Diet Order   Procedures   • Diet: Cardiac Diets, Fluid Restriction (240 mL/tray) Diets, Diabetic Diets; Healthy Heart (2-3 Na+); Consistent Carbohydrate; 2000 mL/day; Texture: Regular Texture (IDDSI 7); Fluid Consistency: Thin (IDDSI 0)       Discharge Activity:   Activity Instructions       Activity as Tolerated              CODE STATUS:  Code Status and Medical Interventions:   Ordered at: 05/12/23 7261     Level Of Support Discussed With:    Patient      Code Status (Patient has no pulse and is not breathing):    CPR (Attempt to Resuscitate)     Medical Interventions (Patient has pulse or is breathing):    Full Support       No future appointments.    Additional Instructions for the Follow-ups that You Need to Schedule       Discharge Follow-up with PCP   As directed       Currently Documented PCP:    Delia Cervantes APRN    PCP Phone Number:    [phone]     Follow Up Details: Follow-up in 3-5 days         Discharge Follow-up with Specified Provider: Dr. Shea Daniels; 1 Week   As directed      To: Dr. Shea Daniels    Follow Up: 1 Week         Discharge Follow-up with Specified Provider: Dr. Ash Leyva; 2 Weeks   As directed      To: Dr. Ash Leyva    Follow Up: 2 Weeks                 Pertinent  and/or Most Recent Results     RADIOLOGY:  XR Shoulder 2+ View Right    Result Date: 5/12/2023    1. Radiographic changes of osteoarthritis involving the glenohumeral and AC joints.      Jose Waterman MD       Electronically Signed and Approved By: Jose Waterman MD on 5/12/2023 at 13:21             XR Foot 2 View Right    Result Date: 5/12/2023    1. Postop changes of transmetatarsal amputation of the right foot 2. Evidence of osteomyelitis involving the distal aspect of the cuboid 3. Gas in the soft tissues along the plantar aspect of the foot just proximal to the amputation site. 4. Periostitis involving the cuneiform is which may be infectious or postsurgical.      Jose Waterman MD       Electronically Signed and Approved By: Jose Waterman MD on 5/12/2023 at 15:19             XR Foot 3+ View Right    Result Date: 3/28/2023    1. No acute osseous process identified. 2. Linear radiopaque density within soft tissues of heel, possibly a foreign body.      ELDER CASILLAS MD       Electronically Signed and Approved By: ELDER CASILLAS MD on 3/28/2023 at 3:05             XR Chest 1 View    Result Date: 5/12/2023   Right upper lobe pneumonia.  ADALBERTO  MD MO       Electronically Signed and Approved By: ADALBERTO HASSAN MD on 5/12/2023 at 14:37             XR Chest 1 View    Result Date: 3/27/2023    1. No acute cardiopulmonary disease       Slava Galvan M.D.       Electronically Signed and Approved By: Slava Galvan M.D. on 3/27/2023 at 20:15             MRI Foot Right Without Contrast    Result Date: 4/4/2023    1. 1.3 cm suspected wire fragment imbedded in the medial aspect of the heel pad 2. Previous amputation of the forefoot.  Osseous edema in the adjacent navicular, cuneiform and cuboid appears worse in the interval, suspected to represent osteomyelitis. 3. Mild edema in the distal fibula, worse in the interval.  This edema may be reactive given the location. 4. Soft tissue edema in the visualized lower extremity could represent cellulitis and or dependent edema. 5. No soft tissue abscess identified     Slava Galvan M.D.       Electronically Signed and Approved By: Slava Galvan M.D. on 4/04/2023 at 10:22             MRI Foot Left Without Contrast    Result Date: 4/4/2023    1. Limited examination, as above 2. Soft tissue ulceration along the heel pad 3. 2.3 cm x 1.6 cm T2 high signal focus medial to the calcaneal cuboid joint.  This may represent a dilated venous varix or sterile fluid collection.  Abscess is considered relatively unlikely secondary to the lack of surrounding inflammatory change 4. Mild soft tissue edema noted elsewhere in the ankle probably representing dependent edema. 5. Edema in the calcaneal body could be secondary to osteomyelitis or reactive in nature.  The amount of edema here appears mildly less prominent than on the 8/11/2021 exam. 6. Postsurgical changes in the posterior calcaneus     Slava Galvan M.D.       Electronically Signed and Approved By: Slava Galvan M.D. on 4/04/2023 at 10:34             XR Hip With or Without Pelvis 2 - 3 View Left    Result Date: 5/12/2023    1. Osteoarthritis of both hips, worse on the left.       Jose Waterman MD       Electronically Signed and Approved By: Jose Waterman MD on 5/12/2023 at 13:20             XR Hip With or Without Pelvis 2 - 3 View Left    Result Date: 3/28/2023   No acute osseous process identified.      ELDER CASILLAS MD       Electronically Signed and Approved By: ELDER CASILLAS MD on 3/28/2023 at 3:04              LAB RESULTS:      Lab 06/14/23  0604   WBC 4.59   HEMOGLOBIN 11.5*   HEMATOCRIT 38.3   PLATELETS 147   MCV 79.3         Lab 06/15/23  0644 06/14/23  0604   SODIUM 133* 138   POTASSIUM 4.4 4.3   CHLORIDE 100 102   CO2 29.5* 29.6*   ANION GAP 3.5* 6.4   BUN 9 9   CREATININE 0.51* 0.58*   EGFR 113.2 108.9   GLUCOSE 228* 174*   CALCIUM 8.7 8.7   MAGNESIUM 1.8 1.7   PHOSPHORUS 3.1  --            Brief Urine Lab Results  (Last result in the past 365 days)        Color   Clarity   Blood   Leuk Est   Nitrite   Protein   CREAT   Urine HCG        05/12/23 1454 Orange   Cloudy   Negative   Small (1+)   Positive   --  Comment: Result not available due to interfering substances.                 Microbiology Results (last 10 days)       ** No results found for the last 240 hours. **              Results for orders placed during the hospital encounter of 03/27/23    Duplex Venous Lower Extremity - Left CAR    Interpretation Summary  •  Technically limited study.  No evidence of deep venous thrombosis in the left common femoral, popliteal, or posterior tibial veins.  Other left lower extremity veins were suboptimally imaged      Results for orders placed during the hospital encounter of 03/27/23    Duplex Venous Lower Extremity - Left CAR    Interpretation Summary  •  Technically limited study.  No evidence of deep venous thrombosis in the left common femoral, popliteal, or posterior tibial veins.  Other left lower extremity veins were suboptimally imaged      Time spent on Discharge including face to face service: Greater than 30 minutes    Electronically signed by Joe Haynes MD,  06/15/23, 12:07 PM EDT.

## 2023-06-15 NOTE — CONSULTS
"Discharge Planning Assessment   Angelica     Patient Name: Jose Shaikh  MRN: [id number]  Today's Date: 6/15/2023    Admit Date: 5/12/2023   Discharge Plan       Row Name 06/15/23 1303       Plan    Final Note Long conversation had with pt about discharge. Pt concerned about going home alone. States he has no family support. SW discussed pt's [employer] members as he has mentioned them in the past. Pt states they can call and check in on him but cannot assist physically. SW notified pt that home health care will provide continued therapy at home. Pt was unaware of this and states that makes him feel better. [facility] and SW notified him that they also have caregiver services through Medicaid. Encouraged pt to speak to his LTADD  regarding this. Pt feels he would be able to transfer to and from a wheelchair but is concerned he cannot walk. DANYEL reiterated that therapy has worked with him for over a month and he does not show improvement. Pt feels [facility] is 'kicking him to the curb based on previous criminal charges.\" SW ensured pt that this was not the case and that he is medically stable to be released from inpatient care. Bedside commode and wheelchair ordered through [employer].      Row Name 06/15/23 113       Plan    Final Discharge Disposition Code 06 - home with home health care    Final Note Pt continues to have no bed offers at this time and continues to decline transfers with therapy. Pt to discharge home today. [facility] notified. Pt will discharge via EMS. Requesting a bedside commode- [employer] notified and will deliver to home.              Home Medical Care       Service Provider Request Status Selected Services Address Phone Fax Patient Preferred    [facility] [place] Accepted N/A [address] [place] [address] [phone] --             Expected Discharge Date and Time       Expected Discharge Date Expected Discharge Time    Han 15, 2023  "     Ella May MSW

## 2023-06-15 NOTE — OUTREACH NOTE
Prep Survey      Flowsheet Row Responses   [facility] discharged [facility]? [facility]   Is LACE score < 7 ? No   Eligibility [facility]   Date of Admission 06/12/23   Date of Discharge 06/15/23   Discharge Disposition Home-Health Care Sv   Discharge diagnosis Sepsis   Does the patient have one of the following disease processes/diagnoses(primary or secondary)? Sepsis   Does the patient have Home health ordered? No   Is there a DME ordered? No   Prep survey completed? Yes            KAPIL A - Registered Nurse

## 2023-06-15 NOTE — THERAPY TREATMENT NOTE
Acute Care - Physical Therapy Treatment Note  [facility]     Patient Name: Jose Shaikh  : 1960  MRN: [id number]  Today's Date: 6/15/2023      Visit Dx:     ICD-10-CM ICD-9-CM   1. Acute febrile illness  R50.9 780.60   2. Sepsis without acute organ dysfunction, due to unspecified organism  A41.9 038.9     995.91   3. Pneumonia of right upper lobe due to infectious organism  J18.9 486   4. Uncontrolled type 2 diabetes mellitus with hyperglycemia  E11.65 250.02   5. Atrial fibrillation with rapid ventricular response  I48.91 427.31   6. Acute osteomyelitis of right foot  M86.171 730.07   7. Decreased activities of daily living (ADL)  Z78.9 V49.89   8. Difficulty in walking  R26.2 719.7     Patient Active Problem List   Diagnosis    Diabetic ulcer of left heel associated with type 2 DM    Acute osteomyelitis of left calcaneus     Diabetic ulcer of left heel associated with type 2 DM    Diabetic ulcer of right midfoot associated with type 2 DM    Paroxysmal atrial fibrillation    Essential hypertension    Hyperlipidemia LDL goal <100    Cellulitis and abscess of foot    High alkaline phosphatase level    Osteomyelitis    Onychomycosis    Onychocryptosis    Foot pain, bilateral    Osteomyelitis of foot, right, acute    Cellulitis of right foot    Type 2 diabetes mellitus, with long-term current use of insulin    Class 3 severe obesity due to excess calories with serious comorbidity and body mass index (BMI) of 45.0 to 49.9 in adult    Anxiety disorder, unspecified    Claustrophobia    Dependence on wheelchair    Depression, unspecified    Long term (current) use of anticoagulants    Long term (current) use of oral hypoglycemic drugs    Wound of foot    Non-prs chronic ulcer oth prt r foot limited to brkdwn skin    Orthostatic hypotension    Other chronic osteomyelitis, right ankle and foot    Personal history of nicotine dependence    Thrombocytopenia, unspecified    Unspecified open wound, right foot, initial  encounter    Diabetic foot infection    Subacute osteomyelitis of right foot    Right foot pain    Sepsis    Onychomycosis    Foot pain, left     Past Medical History:   Diagnosis Date    Absence of toe of right foot     Acute osteomyelitis of left calcaneus  8/18/2021    Anxiety and depression     Arthritis     Claustrophobia     Corns and callus     Diabetic ulcer of left heel associated with type 2 DM 8/18/2021    Diabetic ulcer of left heel associated with type 2 DM 7/6/2021    Diabetic ulcer of right midfoot associated with type 2 DM 8/18/2021    Difficulty walking     Essential hypertension 8/31/2021    Hammertoe     Hyperlipidemia LDL goal <100 8/31/2021    Ingrown toenail     Obesity     Paroxysmal atrial fibrillation 8/31/2021    Polyneuropathy     Pressure ulcer, stage 1     Tinea unguium     Type 2 diabetes mellitus with polyneuropathy      Past Surgical History:   Procedure Laterality Date    CYST REMOVAL      center of back; benign    INCISION AND DRAINAGE ABSCESS      back    INCISION AND DRAINAGE LEG Right 12/10/2021    Procedure: INCISION AND DRAINAGE LOWER EXTREMITY;  Surgeon: Ash Leyva DPM;  Location: [facility];  Service: Podiatry;  Laterality: Right;    OTHER SURGICAL HISTORY      Surgical clips left foot    TOE SURGERY Right     Removal of 5th toe    TRANS METATARSAL AMPUTATION Right 12/2/2021    Procedure: AMPUTATION TRANS METATARSAL;  Surgeon: Ash Leyva DPM;  Location: [facility] OR;  Service: Podiatry;  Laterality: Right;    WRIST SURGERY Left     repair of injury     PT Assessment (last 12 hours)       PT Evaluation and Treatment       Row Name 06/15/23 0724          Physical Therapy Time and Intention    Subjective Information complains of;weakness;fatigue;pain  -DK     Document Type therapy note (daily note)  -DK     Mode of Treatment individual therapy;physical therapy  -DK     Patient Effort good  -DK     Symptoms Noted During/After Treatment  fatigue;increased pain  -       Row Name 06/15/23 1118          Pain    Pretreatment Pain Rating 5/10  -DK     Posttreatment Pain Rating 5/10  -DK     Pain Location - Side/Orientation Bilateral  -DK     Pain Location generalized  -DK     Pain Location - knee  -DK     Pain Intervention(s) Distraction;Therapeutic presence  -       Row Name 06/15/23 1118          Cognition    Affect/Mental Status (Cognition) WFL  -DK     Orientation Status (Cognition) oriented x 4  -DK     Follows Commands (Cognition) WFL  -DK     Cognitive Function WFL  -DK     Personal Safety Interventions gait belt;nonskid shoes/slippers when out of bed;supervised activity  -       Row Name 06/15/23 1118          Motor Skills    Motor Skills --  therapeutic exercises  -DK     Coordination WFL  -     Therapeutic Exercise hip;knee;ankle  -     Additional Documentation --  Pt declined transfers due to c/o bilateral knee pain.  -       Row Name 06/15/23 1118          Hip (Therapeutic Exercise)    Hip (Therapeutic Exercise) AAROM (active assistive range of motion)  -     Hip AAROM (Therapeutic Exercise) bilateral;flexion;extension;aBduction;aDduction;supine;10 repetitions;2 sets  -       Row Name 06/15/23 1118          Knee (Therapeutic Exercise)    Knee (Therapeutic Exercise) AAROM (active assistive range of motion)  -     Knee AAROM (Therapeutic Exercise) bilateral;flexion;extension;supine;10 repetitions;2 sets  -       Row Name 06/15/23 1118          Ankle (Therapeutic Exercise)    Ankle (Therapeutic Exercise) AAROM (active assistive range of motion)  -     Ankle AAROM (Therapeutic Exercise) bilateral;dorsiflexion;plantarflexion;supine;10 repetitions;2 sets  -       Row Name             Wound 12/02/21 Right anterior foot Incision    Wound - Properties Group Placement Date: 12/02/21  -ES Side: Right  -ES Orientation: anterior  -ES Location: foot  -ES Primary Wound Type: Incision  -ES    Retired Wound - Properties Group Placement  Date: 12/02/21  -ES Side: Right  -ES Orientation: anterior  -ES Location: foot  -ES Primary Wound Type: Incision  -ES    Retired Wound - Properties Group Date first assessed: 12/02/21  -ES Side: Right  -ES Location: foot  -ES Primary Wound Type: Incision  -ES      Row Name             Wound 06/22/21 1133 Left lateral heel    Wound - Properties Group Placement Date: 06/22/21  -FABIOLA Placement Time: 1133  -FABIOLA Present on Hospital Admission: Y  -FABIOLA Side: Left  -FABIOLA Orientation: lateral  -FABIOLA Location: heel  -FABIOLA    Retired Wound - Properties Group Placement Date: 06/22/21  -FABIOLA Placement Time: 1133  -FABIOLA Present on Hospital Admission: Y  -FABIOLA Side: Left  -FABIOLA Orientation: lateral  -FABIOLA Location: heel  -FABIOLA    Retired Wound - Properties Group Date first assessed: 06/22/21  -FAIBOLA Time first assessed: 1133  -FABIOLA Present on Hospital Admission: Y  -FABIOLA Side: Left  -FABIOLA Location: heel  -FABIOLA      Row Name             Wound 05/19/23 1317 gluteal Blisters    Wound - Properties Group Placement Date: 05/19/23  -RASHARD Placement Time: 1317  -RASHARD Present on Hospital Admission: N  -RASHARD Location: gluteal  -RASHARD Primary Wound Type: Blisters  -RASHARD    Retired Wound - Properties Group Placement Date: 05/19/23  -RASHARD Placement Time: 1317  -RASHARD Present on Hospital Admission: N  -RASHARD Location: gluteal  -RASHARD Primary Wound Type: Blisters  -RASHARD    Retired Wound - Properties Group Date first assessed: 05/19/23  -RASHARD Time first assessed: 1317  -RASHARD Present on Hospital Admission: N  -RASHARD Location: gluteal  -RASHARD Primary Wound Type: Blisters  -RASHARD      Row Name 06/15/23 1118          Plan of Care Review    Plan of Care Reviewed With patient  -DK     Progress no change  -DK       Row Name 06/15/23 1118          Positioning and Restraints    Pre-Treatment Position in bed  -DK     Post Treatment Position bed  -DK     In Bed supine;call light within reach;encouraged to call for assist;exit alarm on;legs elevated;heels elevated  side rails up x 4  -DK       Row Name 06/15/23 1118           Therapy Assessment/Plan (PT)    Rehab Potential (PT) fair, will monitor progress closely  -DK     Criteria for Skilled Interventions Met (PT) skilled treatment is necessary  -DK     Therapy Frequency (PT) daily  -DK     Problem List (PT) problems related to;balance;mobility;range of motion (ROM);strength;pain  -DK     Activity Limitations Related to Problem List (PT) unable to ambulate safely;unable to transfer safely  -DK       Row Name 06/15/23 1118          Progress Summary (PT)    Progress Toward Functional Goals (PT) progress toward functional goals is fair  -DK               User Key  (r) = Recorded By, (t) = Taken By, (c) = Cosigned By      Initials Name Provider Type    Karon Jordan, RN Registered Nurse    Mckenna Landaverde PTA Physical Therapist Assistant    Marlen Vaughn RN Registered Nurse    Katlyn Garcia RN Registered Nurse                      PT Recommendation and Plan  Planned Therapy Interventions (PT): balance training, bed mobility training, gait training, home exercise program, strengthening, transfer training  Therapy Frequency (PT): daily  Progress Summary (PT)  Progress Toward Functional Goals (PT): progress toward functional goals is fair  Plan of Care Reviewed With: patient  Progress: no change   Outcome Measures       Row Name 06/15/23 1118 06/13/23 1113          How much help from another person do you currently need...    Turning from your back to your side while in flat bed without using bedrails? 3  -DK 3  -DK     Moving from lying on back to sitting on the side of a flat bed without bedrails? 3  -DK 3  -DK     Moving to and from a bed to a chair (including a wheelchair)? 1  -DK 2  -DK     Standing up from a chair using your arms (e.g., wheelchair, bedside chair)? 2  -DK 2  -DK     Climbing 3-5 steps with a railing? 1  -DK 1  -DK     To walk in hospital room? 1  -DK 1  -DK     AM-PAC 6 Clicks Score (PT) 11  -DK 12  -DK        Functional Assessment    Outcome Measure  Options AM-PAC 6 Clicks Basic Mobility (PT)  -DK AM-PAC 6 Clicks Basic Mobility (PT)  -DK               User Key  (r) = Recorded By, (t) = Taken By, (c) = Cosigned By      Initials Name Provider Type    Mckenna Landaverde PTA Physical Therapist Assistant                     Time Calculation:    PT Charges       Row Name 06/15/23 1121             Time Calculation    PT Received On 06/15/23  -DK      PT Goal Re-Cert Due Date 06/23/23  -DK         Timed Charges    95450 - PT Therapeutic Exercise Minutes 14  -DK      05902 - PT Therapeutic Activity Minutes 3  -DK         Total Minutes    Timed Charges Total Minutes 17  -DK       Total Minutes 17  -DK                User Key  (r) = Recorded By, (t) = Taken By, (c) = Cosigned By      Initials Name Provider Type    Mckenna Landaverde PTA Physical Therapist Assistant                  Therapy Charges for Today       Code Description Service Date Service Provider Modifiers Qty    10755509007 HC PT THER PROC EA 15 MIN 6/15/2023 Mckenna Wong PTA GP 1            PT G-Codes  Outcome Measure Options: AM-PAC 6 Clicks Basic Mobility (PT)  AM-PAC 6 Clicks Score (PT): 11  AM-PAC 6 Clicks Score (OT): 17    Mckenna Wong PTA  6/15/2023

## 2023-06-16 ENCOUNTER — TELEPHONE (OUTPATIENT)
Dept: INTERNAL MEDICINE | Facility: CLINIC | Age: 63
End: 2023-06-16

## 2023-06-16 ENCOUNTER — TRANSITIONAL CARE MANAGEMENT TELEPHONE ENCOUNTER (OUTPATIENT)
Dept: CALL CENTER | Facility: HOSPITAL | Age: 63
End: 2023-06-16
Payer: MEDICARE

## 2023-06-16 PROBLEM — Z74.09 IMPAIRED MOBILITY AND ADLS: Status: ACTIVE | Noted: 2023-06-16

## 2023-06-16 PROBLEM — Z78.9 IMPAIRED MOBILITY AND ADLS: Status: ACTIVE | Noted: 2023-06-16

## 2023-06-16 NOTE — TELEPHONE ENCOUNTER
Received a call from home health, patient was discharged from the hospital unable to walk or move. Pt is now not answering the telephone so home health is sending a welfare check.

## 2023-06-16 NOTE — OUTREACH NOTE
"Call Center TCM Note      Flowsheet Row Responses   [facility] patient discharged from? [facility]   Does the patient have one of the following disease processes/diagnoses(primary or secondary)? Sepsis   TCM attempt successful? Yes  [no names listed on verbal release]   Call start time 0826   Call end time 0838   Discharge diagnosis Sepsis   Meds reviewed with patient/caregiver? Yes   Is the patient having any side effects they believe may be caused by any medication additions or changes? No   Does the patient have all medications related to this admission filled (includes all antibiotics, inhalers, nebulizers,steroids,etc.) Yes   Is the patient taking all medications as directed (includes completed medication regime)? Yes   Comments Hospital f/u on 6/23/23@0900am   Does the patient have an appointment with their PCP within 7 days of discharge? Yes   What is the Home health agency?  [facility]   Has home health visited the patient within 72 hours of discharge? Call prior to 72 hours   Home health interventions Called Home Health agency   Home health comments Pt inquired about HH visits and told him to expect a call--this RN went ahead and called [facility] to alert of situation and inquire when they could visit,  they have already talked with pt this am and are aware of situation.   What DME was ordered? [facility]   Has all DME been delivered? No   Psychosocial issues? Yes  [Placement issues, self harm statements, inability to care for self, no caregiver/family support]   Nursing interventions Other   Notified Case Management Psychosocial (Urgent)  [Pt expressed to this RN that he was unable to care for himself at home and couldn't get out of bed.  He expressed ideation of self harm and said he may just \"put a bullet in my head.\" Allowed time for active listening/probem solving]   Comments Pt agreeable for this RN to reach out to [facility] and also to the extended services of [facility] for his placement/caregiving/psychosocial needs. " Offered number for mental health crisis,  pt declined and reports no number would stop him.  This Rn did go ahead and tell him [phone] is number.  Pt reports that he would be ok as long as he heard from someone in a few days to offer aid/assistance.  This Rn worked with nurse/DANYEL valdez who advised a welfare check based on pt statements of self-harm,  call made to [place] [employer].   What is the patient's perception of their health status since discharge? Worsening  [Pt would not even go into detail of how he was feeling as he was so concerned as to how he was going to take care of himself. He reports the pain in his knee is what prohibits his movement and that he needs surgery.]   Nursing interventions Nurse provided patient education  [This RN reinforced with pt the importance of compliance with P.T. and rehab recommendations if accepted to facilities--he reports he would do whatever he needs to do at this time.  He has some issues/misunderstanding apparently w/ gait belt  in hospital.]   Nursing interventions Nurse provided patient education  [This Rn explained to him the reason gait belt is used--v/u.]   Additional teach back comments Pt anticipates HH visit and has called [facility] himself this am to request reconsideration for admission.   TCM call completed? Yes   Call end time 0838   Would this patient benefit from a Referral to [facility]? Yes  [Placement issues, no caregiving support, inability to care for self, self harm concerns]            Kamilla Rodriguez RN    6/16/2023, 09:41 EDT

## 2023-07-11 PROBLEM — M79.89 LIMB SWELLING: Status: ACTIVE | Noted: 2023-07-11

## 2023-07-11 PROBLEM — R06.02 SHORTNESS OF BREATH: Status: ACTIVE | Noted: 2023-07-11

## 2023-07-11 PROBLEM — Z89.421 ABSENCE OF TOE OF RIGHT FOOT: Status: ACTIVE | Noted: 2023-07-11

## 2023-07-11 PROBLEM — R26.2 DISABILITY OF WALKING: Status: ACTIVE | Noted: 2023-07-11

## 2023-07-11 PROBLEM — L84 CORNS AND CALLOSITY: Status: ACTIVE | Noted: 2023-07-11

## 2023-07-11 PROBLEM — T14.8XXA FRACTURE: Status: ACTIVE | Noted: 2023-07-11

## 2023-07-11 PROBLEM — G62.9 POLYNEUROPATHY: Status: ACTIVE | Noted: 2023-07-11

## 2023-07-11 PROBLEM — L89.91 PRESSURE ULCER, STAGE 1: Status: ACTIVE | Noted: 2023-07-11

## 2023-07-31 ENCOUNTER — HOSPITAL ENCOUNTER (EMERGENCY)
Facility: HOSPITAL | Age: 63
Discharge: HOME | End: 2023-08-01
Attending: EMERGENCY MEDICINE | Admitting: EMERGENCY MEDICINE
Payer: MEDICARE

## 2023-07-31 ENCOUNTER — APPOINTMENT (OUTPATIENT)
Dept: GENERAL RADIOLOGY | Facility: HOSPITAL | Age: 63
End: 2023-07-31
Payer: MEDICARE

## 2023-07-31 DIAGNOSIS — M16.12 OSTEOARTHRITIS OF LEFT HIP, UNSPECIFIED OSTEOARTHRITIS TYPE: Primary | ICD-10-CM

## 2023-07-31 PROCEDURE — 73562 X-RAY EXAM OF KNEE 3: CPT

## 2023-07-31 PROCEDURE — 73502 X-RAY EXAM HIP UNI 2-3 VIEWS: CPT

## 2023-07-31 PROCEDURE — 99283 EMERGENCY DEPT VISIT LOW MDM: CPT

## 2023-08-01 ENCOUNTER — APPOINTMENT (OUTPATIENT)
Dept: GENERAL RADIOLOGY | Facility: HOSPITAL | Age: 63
End: 2023-08-01
Payer: MEDICARE

## 2023-08-01 ENCOUNTER — APPOINTMENT (OUTPATIENT)
Dept: CT IMAGING | Facility: HOSPITAL | Age: 63
End: 2023-08-01
Payer: MEDICARE

## 2023-08-01 VITALS
BODY MASS INDEX: 40.43 KG/M2 | HEIGHT: 74 IN | WEIGHT: 315 LBS | HEART RATE: 96 BPM | SYSTOLIC BLOOD PRESSURE: 117 MMHG | RESPIRATION RATE: 16 BRPM | DIASTOLIC BLOOD PRESSURE: 59 MMHG | OXYGEN SATURATION: 98 % | TEMPERATURE: 99.6 F

## 2023-08-01 VITALS
SYSTOLIC BLOOD PRESSURE: 94 MMHG | RESPIRATION RATE: 18 BRPM | OXYGEN SATURATION: 96 % | TEMPERATURE: 99.2 F | HEIGHT: 74 IN | HEART RATE: 87 BPM | DIASTOLIC BLOOD PRESSURE: 62 MMHG | BODY MASS INDEX: 40.43 KG/M2 | WEIGHT: 315 LBS

## 2023-08-01 DIAGNOSIS — S09.90XA MINOR HEAD INJURY, INITIAL ENCOUNTER: ICD-10-CM
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
## 2023-08-01 LAB
ALBUMIN SERPL-MCNC: 2.9 G/DL (ref 3.5–5.2)
ALBUMIN/GLOB SERPL: 0.9 G/DL
ALP SERPL-CCNC: 171 U/L (ref 39–117)
ALT SERPL W P-5'-P-CCNC: 18 U/L (ref 1–41)
ANION GAP SERPL CALCULATED.3IONS-SCNC: 12.9 MMOL/L (ref 5–15)
AST SERPL-CCNC: 31 U/L (ref 1–40)
BASOPHILS # BLD AUTO: 0.02 10*3/MM3 (ref 0–0.2)
BASOPHILS NFR BLD AUTO: 0.3 % (ref 0–1.5)
BILIRUB SERPL-MCNC: 1.5 MG/DL (ref 0–1.2)
BUN SERPL-MCNC: 15 MG/DL (ref 8–23)
BUN/CREAT SERPL: 20.3 (ref 7–25)
CALCIUM SPEC-SCNC: 8.4 MG/DL (ref 8.6–10.5)
CHLORIDE SERPL-SCNC: 103 MMOL/L (ref 98–107)
CO2 SERPL-SCNC: 21.1 MMOL/L (ref 22–29)
CREAT SERPL-MCNC: 0.74 MG/DL (ref 0.76–1.27)
DEPRECATED RDW RBC AUTO: 49.4 FL (ref 37–54)
EGFRCR SERPLBLD CKD-EPI 2021: 101.2 ML/MIN/1.73
EOSINOPHIL # BLD AUTO: 0.01 10*3/MM3 (ref 0–0.4)
EOSINOPHIL NFR BLD AUTO: 0.2 % (ref 0.3–6.2)
ERYTHROCYTE [DISTWIDTH] IN BLOOD BY AUTOMATED COUNT: 17.5 % (ref 12.3–15.4)
GLOBULIN UR ELPH-MCNC: 3.4 GM/DL
GLUCOSE SERPL-MCNC: 199 MG/DL (ref 65–99)
HCT VFR BLD AUTO: 33 % (ref 37.5–51)
HGB BLD-MCNC: 10.1 G/DL (ref 13–17.7)
HOLD SPECIMEN: NORMAL
HOLD SPECIMEN: NORMAL
IMM GRANULOCYTES # BLD AUTO: 0.03 10*3/MM3 (ref 0–0.05)
IMM GRANULOCYTES NFR BLD AUTO: 0.5 % (ref 0–0.5)
LYMPHOCYTES # BLD AUTO: 0.33 10*3/MM3 (ref 0.7–3.1)
LYMPHOCYTES NFR BLD AUTO: 5.6 % (ref 19.6–45.3)
MAGNESIUM SERPL-MCNC: 1.5 MG/DL (ref 1.6–2.4)
MCH RBC QN AUTO: 24.1 PG (ref 26.6–33)
MCHC RBC AUTO-ENTMCNC: 30.6 G/DL (ref 31.5–35.7)
MCV RBC AUTO: 78.8 FL (ref 79–97)
MONOCYTES # BLD AUTO: 0.25 10*3/MM3 (ref 0.1–0.9)
MONOCYTES NFR BLD AUTO: 4.3 % (ref 5–12)
NEUTROPHILS NFR BLD AUTO: 5.21 10*3/MM3 (ref 1.7–7)
NEUTROPHILS NFR BLD AUTO: 89.1 % (ref 42.7–76)
NRBC BLD AUTO-RTO: 0 /100 WBC (ref 0–0.2)
PLATELET # BLD AUTO: 107 10*3/MM3 (ref 140–450)
PMV BLD AUTO: 11 FL (ref 6–12)
POTASSIUM SERPL-SCNC: 4.1 MMOL/L (ref 3.5–5.2)
PROT SERPL-MCNC: 6.3 G/DL (ref 6–8.5)
RBC # BLD AUTO: 4.19 10*6/MM3 (ref 4.14–5.8)
SODIUM SERPL-SCNC: 137 MMOL/L (ref 136–145)
TROPONIN T SERPL HS-MCNC: 17 NG/L
WBC NRBC COR # BLD: 5.85 10*3/MM3 (ref 3.4–10.8)
WHOLE BLOOD HOLD COAG: NORMAL
WHOLE BLOOD HOLD SPECIMEN: NORMAL

## 2023-08-01 PROCEDURE — 85025 COMPLETE CBC W/AUTO DIFF WBC: CPT | Performed by: EMERGENCY MEDICINE

## 2023-08-01 PROCEDURE — 70450 CT HEAD/BRAIN W/O DYE: CPT

## 2023-08-01 PROCEDURE — 80053 COMPREHEN METABOLIC PANEL: CPT | Performed by: EMERGENCY MEDICINE

## 2023-08-01 PROCEDURE — 83735 ASSAY OF MAGNESIUM: CPT | Performed by: EMERGENCY MEDICINE

## 2023-08-01 PROCEDURE — 99284 EMERGENCY DEPT VISIT MOD MDM: CPT

## 2023-08-01 PROCEDURE — 93010 ELECTROCARDIOGRAM REPORT: CPT | Performed by: INTERNAL MEDICINE

## 2023-08-01 PROCEDURE — 71045 X-RAY EXAM CHEST 1 VIEW: CPT

## 2023-08-01 PROCEDURE — 93005 ELECTROCARDIOGRAM TRACING: CPT | Performed by: EMERGENCY MEDICINE

## 2023-08-01 PROCEDURE — 84484 ASSAY OF TROPONIN QUANT: CPT | Performed by: EMERGENCY MEDICINE

## 2023-08-01 RX ORDER — HYDROCODONE BITARTRATE AND ACETAMINOPHEN 5; 325 MG/1; MG/1
1 TABLET ORAL EVERY 6 HOURS PRN
Qty: 12 TABLET | Refills: 0 | Status: SHIPPED | OUTPATIENT
Start: 2023-08-01

## 2023-08-01 RX ORDER — HYDROCODONE BITARTRATE AND ACETAMINOPHEN 5; 325 MG/1; MG/1
2 TABLET ORAL ONCE
Status: COMPLETED | OUTPATIENT
Start: 2023-08-01 | End: 2023-08-01

## 2023-08-01 RX ORDER — SODIUM CHLORIDE 0.9 % (FLUSH) 0.9 %
10 SYRINGE (ML) INJECTION AS NEEDED
Status: DISCONTINUED | OUTPATIENT
Start: 2023-08-01 | End: 2023-08-01 | Stop reason: HOSPADM

## 2023-08-01 RX ADMIN — HYDROCODONE BITARTRATE AND ACETAMINOPHEN 2 TABLET: 5; 325 TABLET ORAL at 01:08

## 2023-08-01 NOTE — ED PROVIDER NOTES
Time: 10:08 AM EDT  Date of encounter:  8/1/2023  Independent Historian/Clinical History and Information was obtained by:   Patient    History is limited by: N/A    Chief Complaint: Fall      History of Present Illness:  Patient is a 62 y.o. year old male who presents to the emergency department for evaluation of headache after fall.  Patient does report being on Eliquis and he fell in the bathroom hitting his head on the bathtub.  He denies loss of consciousness.  He reports he fell because his legs gave out.  He denies preceding chest pain, shortness of breath.    HPI    Patient Care Team  Primary Care Provider: Delia Cervantes APRN    Past Medical History:     Allergies   Allergen Reactions    Adhesive Tape Rash     Past Medical History:   Diagnosis Date    Absence of toe of right foot     Acute osteomyelitis of left calcaneus  8/18/2021    Anxiety and depression     Arthritis     Claustrophobia     Corns and callus     Diabetic ulcer of left heel associated with type 2 DM 8/18/2021    Diabetic ulcer of left heel associated with type 2 DM 7/6/2021    Diabetic ulcer of right midfoot associated with type 2 DM 8/18/2021    Difficulty walking     Essential hypertension 8/31/2021    Hammertoe     Hyperlipidemia LDL goal <100 8/31/2021    Ingrown toenail     Obesity     Paroxysmal atrial fibrillation 8/31/2021    Polyneuropathy     Pressure ulcer, stage 1     Tinea unguium     Type 2 diabetes mellitus with polyneuropathy      Past Surgical History:   Procedure Laterality Date    CYST REMOVAL      center of back; benign    INCISION AND DRAINAGE ABSCESS      back    INCISION AND DRAINAGE LEG Right 12/10/2021    Procedure: INCISION AND DRAINAGE LOWER EXTREMITY;  Surgeon: Ash Leyva DPM;  Location: [facility];  Service: Podiatry;  Laterality: Right;    OTHER SURGICAL HISTORY      Surgical clips left foot    TOE SURGERY Right     Removal of 5th toe    TRANS METATARSAL AMPUTATION Right 12/2/2021     Procedure: AMPUTATION TRANS METATARSAL;  Surgeon: Ash Leyva DPM;  Location: [facility] MAIN OR;  Service: Podiatry;  Laterality: Right;    WRIST SURGERY Left     repair of injury     Family History   Problem Relation Age of Onset    Heart disease Mother     Heart disease Father     Cancer Father         Unspecified    Heart disease Brother        Home Medications:  Prior to Admission medications    Medication Sig Start Date End Date Taking? Authorizing Provider   acetaminophen (TYLENOL) 650 MG 8 hr tablet Take 1 tablet by mouth Every 8 (Eight) Hours As Needed for Mild Pain.    Provider, MD Laura   apixaban (ELIQUIS) 5 MG tablet tablet Take 1 tablet by mouth Every 12 (Twelve) Hours. 1/31/23   Delia Cervantes APRN   cyclobenzaprine (FLEXERIL) 5 MG tablet Take 1 tablet by mouth 2 (Two) Times a Day As Needed for Muscle Spasms. 6/23/23   Cailin Acosta MD   digoxin (LANOXIN) 125 MCG tablet Take 1 tablet by mouth Daily for 30 days. 6/15/23 7/15/23  Joe Haynes MD   fluticasone (FLONASE) 50 MCG/ACT nasal spray 2 sprays into the nostril(s) as directed by provider Daily.    Provider, MD Laura   HumaLOG KwikPen 100 UNIT/ML solution pen-injector INJECT 30 UNITS UNDER THE SKIN INTO THE APPROPRIATE AREA AS DIRECTED 3 (THREE) TIMES A DAY BEFORE MEALS. 7/19/23   Kami Garcia APRN   HYDROcodone-acetaminophen (NORCO) 5-325 MG per tablet Take 1 tablet by mouth Every 6 (Six) Hours As Needed for Moderate Pain. 8/1/23   Deedee Manning MD   insulin detemir (Levemir FlexPen) 100 UNIT/ML injection Inject 40 Units under the skin into the appropriate area as directed 2 (Two) Times a Day for 30 days. 6/23/23 7/23/23  Cailin Acosta MD   Insulin Lispro (humaLOG) 100 UNIT/ML injection Inject 10 Units under the skin into the appropriate area as directed 3 (Three) Times a Day With Meals. 6/23/23   Cailin Acosta MD   metFORMIN (GLUCOPHAGE) 1000 MG tablet Take 1 tablet by mouth 2 (Two) Times a  Day With Meals. 23   Delia Cervantes APRN   metoprolol succinate XL (TOPROL-XL) 25 MG 24 hr tablet Take 1 tablet by mouth 2 (Two) Times a Day. 23   Cailin Acosta MD   naloxone (NARCAN) 4 MG/0.1ML nasal spray Call 911. Don't prime. [place] in 1 nostril for overdose. Repeat in 2-3 minutes in other nostril if no or minimal breathing/responsiveness. 6/15/23   Joe Haynes MD   pregabalin (LYRICA) 25 MG capsule Take 1 capsule by mouth 3 (Three) Times a Day for 3 days. 6/15/23 6/18/23  Joe Haynes MD   simvastatin (ZOCOR) 20 MG tablet Take 1 tablet by mouth Every Night. 23   Delia Cervantes APRN   Trulicity 3 MG/0.5ML solution pen-injector INJECT ONE SYRINGE UNDER THE SKIN ONCE WEEKLY 23   Kami Garcia APRN        Social History:   Social History     Tobacco Use    Smoking status: Former     Packs/day: 0.00     Years: 1.00     Pack years: 0.00     Types: Cigarettes     Quit date: 1991     Years since quittin.9    Smokeless tobacco: Never    Tobacco comments:     quit at age 30   Vaping Use    Vaping Use: Never used   Substance Use Topics    Alcohol use: Yes     Comment: rare    Drug use: Yes     Types: Marijuana     Comment: occasionally         Review of Systems:  Review of Systems   Constitutional:  Negative for chills and fever.   HENT:  Negative for congestion, rhinorrhea and sore throat.    Eyes:  Negative for pain and visual disturbance.   Respiratory:  Negative for apnea, cough, chest tightness and shortness of breath.    Cardiovascular:  Negative for chest pain and palpitations.   Gastrointestinal:  Negative for abdominal pain, diarrhea, nausea and vomiting.   Genitourinary:  Negative for difficulty urinating and dysuria.   Musculoskeletal:  Negative for joint swelling and myalgias.   Skin:  Negative for color change.   Neurological:  Negative for seizures and headaches.   Psychiatric/Behavioral: Negative.     All other systems reviewed and are negative.  "    Physical Exam:  /59   Pulse 94   Temp 99.6 øF (37.6 øC) (Oral)   Resp 16   Ht 188 cm (74\")   Wt (!) 158 kg (347 lb 10.7 oz)   SpO2 99%   BMI 44.64 kg/mý     Physical Exam  Vitals and nursing note reviewed.   Constitutional:       General: He is not in acute distress.     Appearance: Normal appearance. He is not toxic-appearing.   HENT:      Head: Normocephalic and atraumatic.      Jaw: There is normal jaw occlusion.   Eyes:      General: Lids are normal.      Extraocular Movements: Extraocular movements intact.      Conjunctiva/sclera: Conjunctivae normal.      Pupils: Pupils are equal, round, and reactive to light.   Cardiovascular:      Rate and Rhythm: Normal rate and regular rhythm.      Pulses: Normal pulses.      Heart sounds: Normal heart sounds.   Pulmonary:      Effort: Pulmonary effort is normal. No respiratory distress.      Breath sounds: Normal breath sounds. No wheezing or rhonchi.   Abdominal:      General: Abdomen is flat.      Palpations: Abdomen is soft.      Tenderness: There is no abdominal tenderness. There is no guarding or rebound.   Musculoskeletal:         General: Normal range of motion.      Cervical back: Normal range of motion and neck supple.      Right lower leg: No edema.      Left lower leg: No edema.   Skin:     General: Skin is warm and dry.   Neurological:      Mental Status: He is alert and oriented to person, place, and time. Mental status is at baseline.   Psychiatric:         Mood and Affect: Mood normal.                Procedures:  Procedures      Medical Decision Making:      Comorbidities that affect care:    osteomyelitis, Atrial Fibrillation, Diabetes, Hypertension    External Notes reviewed:    Previous Clinic Note: Internal medicine office visit for general medical management      The following orders were placed and all results were independently analyzed by me:  Orders Placed This Encounter   Procedures    XR Chest 1 View    CT Head Without Contrast "    [place] Draw    Comprehensive Metabolic Panel    Single High Sensitivity Troponin T    Magnesium    Urinalysis With Microscopic If Indicated (No Culture) - Urine, Clean Catch    CBC Auto Differential    NPO Diet NPO Type: Strict NPO    Undress & Gown    Continuous Pulse Oximetry    Vital Signs    Orthostatic Blood Pressure    Oxygen Therapy- Nasal Cannula; Titrate 1-6 LPM Per SpO2; 90 - 95%    POC Glucose Once    ECG 12 Lead ED Triage Standing Order; Weak / Dizzy / AMS    Insert Peripheral IV    Fall Precautions    CBC & Differential    Green Top (Gel)    Lavender Top    Gold Top - SST    Light Blue Top       Medications Given in the Emergency Department:  Medications   sodium chloride 0.9 % flush 10 mL (has no administration in time range)        ED Course:         Labs:    Lab Results (last 24 hours)       Procedure Component Value Units Date/Time    CBC & Differential [[id number]]  (Abnormal) Collected: 08/01/23 0949    Specimen: Blood Updated: 08/01/23 0959    Narrative:      The following orders were created for panel order CBC & Differential.  Procedure                               Abnormality         Status                     ---------                               -----------         ------                     CBC Auto Differential[[id number]]        Abnormal            Final result                 Please view results for these tests on the individual orders.    Comprehensive Metabolic Panel [[id number]]  (Abnormal) Collected: 08/01/23 0949    Specimen: Blood Updated: 08/01/23 1025     Glucose 199 mg/dL      BUN 15 mg/dL      Creatinine 0.74 mg/dL      Sodium 137 mmol/L      Potassium 4.1 mmol/L      Chloride 103 mmol/L      CO2 21.1 mmol/L      Calcium 8.4 mg/dL      Total Protein 6.3 g/dL      Albumin 2.9 g/dL      ALT (SGPT) 18 U/L      AST (SGOT) 31 U/L      Alkaline Phosphatase 171 U/L      Total Bilirubin 1.5 mg/dL      Globulin 3.4 gm/dL      A/G Ratio 0.9 g/dL      BUN/Creatinine Ratio 20.3      Anion Gap 12.9 mmol/L      eGFR 101.2 mL/min/1.73     Narrative:      GFR Normal >60  Chronic Kidney Disease <60  Kidney Failure <15      Single High Sensitivity Troponin T [[id number]]  (Abnormal) Collected: 08/01/23 0949    Specimen: Blood Updated: 08/01/23 1025     HS Troponin T 17 ng/L     Narrative:      High Sensitive Troponin T Reference Range:  <10.0 ng/L- Negative Female for AMI  <15.0 ng/L- Negative Male for AMI  >=10 - Abnormal Female indicating possible myocardial injury.  >=15 - Abnormal Male indicating possible myocardial injury.   Clinicians would have to utilize clinical acumen, EKG, Troponin, and serial changes to determine if it is an Acute Myocardial Infarction or myocardial injury due to an underlying chronic condition.         Magnesium [[id number]]  (Abnormal) Collected: 08/01/23 0949    Specimen: Blood Updated: 08/01/23 1025     Magnesium 1.5 mg/dL     CBC Auto Differential [[id number]]  (Abnormal) Collected: 08/01/23 0949    Specimen: Blood Updated: 08/01/23 0959     WBC 5.85 10*3/mm3      RBC 4.19 10*6/mm3      Hemoglobin 10.1 g/dL      Hematocrit 33.0 %      MCV 78.8 fL      MCH 24.1 pg      MCHC 30.6 g/dL      RDW 17.5 %      RDW-SD 49.4 fl      MPV 11.0 fL      Platelets 107 10*3/mm3      Neutrophil % 89.1 %      Lymphocyte % 5.6 %      Monocyte % 4.3 %      Eosinophil % 0.2 %      Basophil % 0.3 %      Immature Grans % 0.5 %      Neutrophils, Absolute 5.21 10*3/mm3      Lymphocytes, Absolute 0.33 10*3/mm3      Monocytes, Absolute 0.25 10*3/mm3      Eosinophils, Absolute 0.01 10*3/mm3      Basophils, Absolute 0.02 10*3/mm3      Immature Grans, Absolute 0.03 10*3/mm3      nRBC 0.0 /100 WBC              Imaging:    XR Knee 3 View Left    Result Date: 8/1/2023  PROCEDURE: XR KNEE 3 VW LEFT  COMPARISON: 12/20/2022.  INDICATIONS: LEFT KNEE PAIN; NO KNOWN INJURY.  FINDINGS: Four nonweightbearing views of the left knee were obtained.  The views are limited.  The best possible images were obtained.   External artifacts particularly obscure detail.  No acute fracture or acute malalignment is identified.  Generalized osteopenia is suggested.  Minimal, if any, left knee joint effusion is suggested.  Moderate-to-severe tricompartmental osteoarthritis is suspected.  Diffuse soft tissue swelling is suggested.  There are suspected arterial calcifications.  No subcutaneous emphysema.  No retained radiopaque foreign body.  If symptoms or clinical concerns persist, consider imaging follow-up.       No acute fracture or acute malalignment is identified.     Please note that portions of this note were completed with a voice recognition program.  MALIK BELLA JR, MD       Electronically Signed and Approved By: MALIK BELLA JR, MD on 8/01/2023 at 1:30              CT Head Without Contrast    Result Date: 8/1/2023  PROCEDURE: CT HEAD WO CONTRAST  COMPARISON:  None INDICATIONS: hit back of head on bathtub  PROTOCOL:   Standard imaging protocol performed    RADIATION:   DLP: 1145.2mGy*cm   MA and/or KV was adjusted to minimize radiation dose.     TECHNIQUE: After obtaining the patient's consent, CT images were obtained without non-ionic intravenous contrast material.  FINDINGS:  There is no acute displaced calvarial fracture.  There is no acute intracranial hemorrhage, mass lesion, mass effect, midline shift or evidence of acute or evolving infarct.  The ventricular configuration is normal.  Paranasal sinuses and mastoid air cells are clear.  Mild intracranial carotid artery calcifications are present.  Impression:  No acute intracranial findings.       ART CAMPOVERDE MD       Electronically Signed and Approved By: ART CAMPOVERDE MD on 8/01/2023 at 11:12             XR Chest 1 View    Result Date: 8/1/2023  PROCEDURE: XR CHEST 1 VW  COMPARISON: [facility], ROLY, XR CHEST 1 VW, 3/27/2023, 19:35.  [facility], ROLY, XR CHEST 1 VW, 5/12/2023, 14:14.  INDICATIONS: Weak/Dizzy/AMS triage protocol  FINDINGS:   No new consolidations or pleural effusions are observed. The cardiac silhouette and mediastinum are unchanged. No definitive acute osseous abnormalities are seen on this single view.        1. No change from the previous study with no evidence for acute cardiopulmonary process.         ELDER GARRISON MD       Electronically Signed and Approved By: ELDER GARRISON MD on 8/01/2023 at 10:21             XR Hip With or Without Pelvis 2 - 3 View Left    Result Date: 8/1/2023  PROCEDURE: XR HIP W OR WO PELVIS 2-3 VIEW LEFT  COMPARISONS: 5/12/2023; 3/28/2023; 12/20/2022.  INDICATIONS: LEFT HIP PAIN; NO KNOWN INJURY.  FINDINGS: Three views were obtained.  Worsening osteoarthritis involves the left hip joint when compared with the prior 5/12/2023 exam.  Increased joint space narrowing is seen, especially superiorly.  Associated osteonecrosis of the left femoral head cannot be excluded.  No definite acute fracture or acute malalignment is suggested.  The other findings are as described in prior exam reports.       Worsening (severe) osteoarthritis of the left hip joint is suggested.  No acute fracture.  No dislocation.     Please note that portions of this note were completed with a voice recognition program.  MALIK BELLA JR, MD       Electronically Signed and Approved By: MALIK BELLA JR, MD on 8/01/2023 at 1:27                 Differential Diagnosis and Discussion:    Trauma:  Differential diagnosis considered but not limited to were subarachnoid hemorrhage, intracranial bleeding, pneumothorax, cardiac contusion, lung contusion, intra-abdominal bleeding, and compartment syndrome of any extremity or other significant traumatic pathology    All labs were reviewed and interpreted by me.  All X-rays impressions were independently interpreted by me.    MDM  Number of Diagnoses or Management Options  Fall, initial encounter  Minor head injury, initial encounter  Diagnosis management comments: In summary this is a 62-year-old  male who presents to the emergency department for evaluation.  He reports his legs became weak and gave out causing him to fall in the bathroom hitting his head on the shower.  Denies loss of consciousness.  Does take anticoagulation.  CT the brain was unremarkable for acute traumatic injuries.  Chest x-ray unremarkable.  CBC independently reviewed by me and shows no critical abnormalities.  CMP independently reviewed by me and shows no critical abnormalities.  Patient is otherwise well-appearing and in no acute distress.  Very strict return to ER and follow-up instructions have been provided to the patient.               Patient Care Considerations:    NARCOTICS: I considered prescribing opiate pain medication as an outpatient, however no narcotic requiring condition identified      Consultants/Shared Management Plan:    None    Social Determinants of Health:    Patient is independent, reliable, and has access to care.       Disposition and Care Coordination:    Discharged: The patient is suitable and stable for discharge with no need for consideration of observation or admission.    I have explained the patient's condition, diagnoses and treatment plan based on the information available to me at this time. I have answered questions and addressed any concerns. The patient has a good  understanding of the patient's diagnosis, condition, and treatment plan as can be expected at this point. The vital signs have been stable. The patient's condition is stable and appropriate for discharge from the emergency department.      The patient will pursue further outpatient evaluation with the primary care physician or other designated or consulting physician as outlined in the discharge instructions. They are agreeable to this plan of care and follow-up instructions have been explained in detail. The patient has received these instructions in written format and have expressed an understanding of the discharge instructions. The  patient is aware that any significant change in condition or worsening of symptoms should prompt an immediate return to this or the closest emergency department or call to 911.  I have explained discharge medications and the need for follow up with the patient/caretakers. This was also printed in the discharge instructions. Patient was discharged with the following medications and follow up:      Medication List      No changes were made to your prescriptions during this visit.      Delia Cervantes, APRN [address] [address]  [phone]    In 1 week         Final diagnoses:   Fall, initial encounter   Minor head injury, initial encounter        ED Disposition       ED Disposition   Discharge    Condition   Stable    Comment   --               This medical record created using voice recognition software.             Sergio De La Paz MD  08/01/23 1213       Sergio De La Paz MD  08/01/23 121

## 2023-08-01 NOTE — ED PROVIDER NOTES
Time: 12:51 AM EDT  Date of encounter:  7/31/2023  Independent Historian/Clinical History and Information was obtained by:   Patient    History is limited by: N/A    Chief Complaint: left hip and knee pain      History of Present Illness:  Patient is a 62 y.o. year old male who presents to the emergency department for evaluation of Left hip pain.  Patient reports chronic left hip pain.  He is scheduled to see Dr. Clinton on 8/8/2023.  He states he has been told he is really bad arthritis.  He says he also has bad arthritis in bilateral knees.  He states has been taking Tylenol and this has not helped the pain.  He is currently in nursing facility.  He states having difficulty doing physical therapy due to pain.  He denies any other complaints.  Reports chronic wound to left foot that he is being closely monitored for.      HPI    Patient Care Team  Primary Care Provider: Delia Cervantes APRN    Past Medical History:     Allergies   Allergen Reactions    Adhesive Tape Rash     Past Medical History:   Diagnosis Date    Absence of toe of right foot     Acute osteomyelitis of left calcaneus  8/18/2021    Anxiety and depression     Arthritis     Claustrophobia     Corns and callus     Diabetic ulcer of left heel associated with type 2 DM 8/18/2021    Diabetic ulcer of left heel associated with type 2 DM 7/6/2021    Diabetic ulcer of right midfoot associated with type 2 DM 8/18/2021    Difficulty walking     Essential hypertension 8/31/2021    Hammertoe     Hyperlipidemia LDL goal <100 8/31/2021    Ingrown toenail     Obesity     Paroxysmal atrial fibrillation 8/31/2021    Polyneuropathy     Pressure ulcer, stage 1     Tinea unguium     Type 2 diabetes mellitus with polyneuropathy      Past Surgical History:   Procedure Laterality Date    CYST REMOVAL      center of back; benign    INCISION AND DRAINAGE ABSCESS      back    INCISION AND DRAINAGE LEG Right 12/10/2021    Procedure: INCISION AND DRAINAGE LOWER EXTREMITY;   Surgeon: Ash Leyva DPM;  Location: [facility] MAIN OR;  Service: Podiatry;  Laterality: Right;    OTHER SURGICAL HISTORY      Surgical clips left foot    TOE SURGERY Right     Removal of 5th toe    TRANS METATARSAL AMPUTATION Right 12/2/2021    Procedure: AMPUTATION TRANS METATARSAL;  Surgeon: Ash Leyva DPM;  Location: [facility] MAIN OR;  Service: Podiatry;  Laterality: Right;    WRIST SURGERY Left     repair of injury     Family History   Problem Relation Age of Onset    Heart disease Mother     Heart disease Father     Cancer Father         Unspecified    Heart disease Brother        Home Medications:  Prior to Admission medications    Medication Sig Start Date End Date Taking? Authorizing Provider   acetaminophen (TYLENOL) 650 MG 8 hr tablet Take 1 tablet by mouth Every 8 (Eight) Hours As Needed for Mild Pain.    Provider, MD Laura   apixaban (ELIQUIS) 5 MG tablet tablet Take 1 tablet by mouth Every 12 (Twelve) Hours. 1/31/23   Delia Cervantes APRN   cyclobenzaprine (FLEXERIL) 5 MG tablet Take 1 tablet by mouth 2 (Two) Times a Day As Needed for Muscle Spasms. 6/23/23   Cailin Acosta MD   digoxin (LANOXIN) 125 MCG tablet Take 1 tablet by mouth Daily for 30 days. 6/15/23 7/15/23  Joe Haynes MD   fluticasone (FLONASE) 50 MCG/ACT nasal spray 2 sprays into the nostril(s) as directed by provider Daily.    Provider, MD Laura   HumaLOG KwikPen 100 UNIT/ML solution pen-injector INJECT 30 UNITS UNDER THE SKIN INTO THE APPROPRIATE AREA AS DIRECTED 3 (THREE) TIMES A DAY BEFORE MEALS. 7/19/23   Kami Garcia APRN   insulin detemir (Levemir FlexPen) 100 UNIT/ML injection Inject 40 Units under the skin into the appropriate area as directed 2 (Two) Times a Day for 30 days. 6/23/23 7/23/23  Cailin Acosta MD   Insulin Lispro (humaLOG) 100 UNIT/ML injection Inject 10 Units under the skin into the appropriate area as directed 3 (Three) Times a Day With Meals. 6/23/23    Cailin Acosta MD   metFORMIN (GLUCOPHAGE) 1000 MG tablet Take 1 tablet by mouth 2 (Two) Times a Day With Meals. 23   Delia Cervantes APRN   metoprolol succinate XL (TOPROL-XL) 25 MG 24 hr tablet Take 1 tablet by mouth 2 (Two) Times a Day. 23   Cailin Acosta MD   naloxone (NARCAN) 4 MG/0.1ML nasal spray Call 911. Don't prime. [place] in 1 nostril for overdose. Repeat in 2-3 minutes in other nostril if no or minimal breathing/responsiveness. 6/15/23   Joe Haynes MD   pregabalin (LYRICA) 25 MG capsule Take 1 capsule by mouth 3 (Three) Times a Day for 3 days. 6/15/23 6/18/23  Joe Haynes MD   simvastatin (ZOCOR) 20 MG tablet Take 1 tablet by mouth Every Night. 23   Delia Cervantes APRN   Trulicity 3 MG/0.5ML solution pen-injector INJECT ONE SYRINGE UNDER THE SKIN ONCE WEEKLY 23   Kami Garcia APRN        Social History:   Social History     Tobacco Use    Smoking status: Former     Packs/day: 0.00     Years: 1.00     Pack years: 0.00     Types: Cigarettes     Quit date: 1991     Years since quittin.9    Smokeless tobacco: Never    Tobacco comments:     quit at age 30   Vaping Use    Vaping Use: Never used   Substance Use Topics    Alcohol use: Yes     Comment: rare    Drug use: Yes     Types: Marijuana     Comment: occasionally         Review of Systems:  Review of Systems   Constitutional:  Negative for chills and fever.   HENT:  Negative for congestion, ear pain and sore throat.    Eyes:  Negative for pain.   Respiratory:  Negative for cough, chest tightness and shortness of breath.    Cardiovascular:  Positive for leg swelling. Negative for chest pain.   Gastrointestinal:  Negative for abdominal pain, diarrhea, nausea and vomiting.   Genitourinary:  Negative for flank pain and hematuria.   Musculoskeletal:  Positive for arthralgias. Negative for joint swelling.   Skin:  Negative for pallor.   Neurological:  Negative for seizures and headaches.   All other  "systems reviewed and are negative.     Physical Exam:  /55 (BP Location: Right arm, Patient Position: Lying)   Pulse 99   Temp 99.4 øF (37.4 øC) (Oral)   Resp 16   Ht 188 cm (74\")   Wt (!) 163 kg (358 lb 7.5 oz)   SpO2 93%   BMI 46.02 kg/mý     Physical Exam  Constitutional:       Appearance: Normal appearance.   HENT:      Head: Normocephalic and atraumatic.      Nose: Nose normal.      Mouth/Throat:      Mouth: Mucous membranes are moist.   Eyes:      Extraocular Movements: Extraocular movements intact.      Conjunctiva/sclera: Conjunctivae normal.      Pupils: Pupils are equal, round, and reactive to light.   Cardiovascular:      Rate and Rhythm: Normal rate and regular rhythm.      Pulses: Normal pulses.      Heart sounds: Normal heart sounds.   Pulmonary:      Effort: Pulmonary effort is normal.      Breath sounds: Normal breath sounds.   Abdominal:      General: There is no distension.      Palpations: Abdomen is soft.      Tenderness: There is no abdominal tenderness.   Musculoskeletal:         General: Normal range of motion.      Cervical back: Normal range of motion.      Right lower leg: Edema present.      Left lower leg: Edema present.      Comments: Pain with range of motion of the left hip and left knee.  Legs neurovascularly intact.  He also is pain with range of motion of right hip and right knee.  Legs neurovascularly intact.  He has bilateral lower extremity edema.  Amputation of the right forefoot   Skin:     General: Skin is warm and dry.      Capillary Refill: Capillary refill takes less than 2 seconds.   Neurological:      General: No focal deficit present.      Mental Status: He is alert and oriented to person, place, and time. Mental status is at baseline.   Psychiatric:         Mood and Affect: Mood normal.         Behavior: Behavior normal.                Procedures:  Procedures      Medical Decision Making:      Comorbidities that affect care:    Diabetes, Obesity    External " Notes reviewed:    Previous Clinic Note: Patient was seen by wound care on 7/6/2023 for evaluation of postoperative wound dehiscence, diabetic ulcer of left heel, chronic osteomyelitis of right ankle and foot s/p amputation of right foot through metatarsal bone.      The following orders were placed and all results were independently analyzed by me:  Orders Placed This Encounter   Procedures    XR Hip With or Without Pelvis 2 - 3 View Left    XR Knee 3 View Left       Medications Given in the Emergency Department:  Medications   HYDROcodone-acetaminophen (NORCO) 5-325 MG per tablet 2 tablet (2 tablets Oral Given 8/1/23 0108)        ED Course:         Labs:    Lab Results (last 24 hours)       ** No results found for the last 24 hours. **             Imaging:    XR Knee 3 View Left    Result Date: 8/1/2023  PROCEDURE: XR KNEE 3 VW LEFT  COMPARISON: 12/20/2022.  INDICATIONS: LEFT KNEE PAIN; NO KNOWN INJURY.  FINDINGS: Four nonweightbearing views of the left knee were obtained.  The views are limited.  The best possible images were obtained.  External artifacts particularly obscure detail.  No acute fracture or acute malalignment is identified.  Generalized osteopenia is suggested.  Minimal, if any, left knee joint effusion is suggested.  Moderate-to-severe tricompartmental osteoarthritis is suspected.  Diffuse soft tissue swelling is suggested.  There are suspected arterial calcifications.  No subcutaneous emphysema.  No retained radiopaque foreign body.  If symptoms or clinical concerns persist, consider imaging follow-up.       No acute fracture or acute malalignment is identified.     Please note that portions of this note were completed with a voice recognition program.  MALIK BELLA JR, MD       Electronically Signed and Approved By: MALIK BELLA JR, MD on 8/01/2023 at 1:30              XR Hip With or Without Pelvis 2 - 3 View Left    Result Date: 8/1/2023  PROCEDURE: XR HIP W OR WO PELVIS 2-3 VIEW LEFT   COMPARISONS: 5/12/2023; 3/28/2023; 12/20/2022.  INDICATIONS: LEFT HIP PAIN; NO KNOWN INJURY.  FINDINGS: Three views were obtained.  Worsening osteoarthritis involves the left hip joint when compared with the prior 5/12/2023 exam.  Increased joint space narrowing is seen, especially superiorly.  Associated osteonecrosis of the left femoral head cannot be excluded.  No definite acute fracture or acute malalignment is suggested.  The other findings are as described in prior exam reports.       Worsening (severe) osteoarthritis of the left hip joint is suggested.  No acute fracture.  No dislocation.     Please note that portions of this note were completed with a voice recognition program.  MALIK BELLA JR, MD       Electronically Signed and Approved By: MALIK BELLA JR, MD on 8/01/2023 at 1:27                 Differential Diagnosis and Discussion:    Extremity Pain: Differential diagnosis includes but is not limited to soft tissue sprain, tendonitis, tendon injury, dislocation, fracture, deep vein thrombosis, arterial insufficiency, osteoarthritis, bursitis, and ligamentous damage.    All X-rays impressions were independently interpreted by me.    MDM  Number of Diagnoses or Management Options  Diagnosis management comments: Patient is afebrile and nontoxic-appearing.  Vital signs stable.  Patient reports chronic left hip pain.  He reports this has been getting progressively worse.  He has upcoming appointment with orthopedic surgery.  X-ray showed worsening osteoarthritis of left hip no acute fracture no dislocation.  Patient was given pain medication.  Discussed these findings with the patient.  Recommend follow-up with orthopedic surgery as scheduled.  Will give prescription for pain medication.  Discussed return precautions, discharge instructions and answered all his questions.       Amount and/or Complexity of Data Reviewed  Tests in the radiology section of [facility]: reviewed  Review and summarize past medical  records: yes  Independent visualization of images, tracings, or specimens: yes    Risk of Complications, Morbidity, and/or Mortality  Presenting problems: moderate  Management options: moderate    Patient Progress  Patient progress: stable           Patient Care Considerations:    CHEST X-RAY: I considered ordering a chest x-ray however no respiratory symptoms at this time      Consultants/Shared Management Plan:    None    Social Determinants of Health:    Patient is a nursing home/assisted living resident and has reliable access to care.      Disposition and Care Coordination:    Discharged: The patient is suitable and stable for discharge with no need for consideration of observation or admission.    I have explained the patient's condition, diagnoses and treatment plan based on the information available to me at this time. I have answered questions and addressed any concerns. The patient has a good  understanding of the patient's diagnosis, condition, and treatment plan as can be expected at this point. The vital signs have been stable. The patient's condition is stable and appropriate for discharge from the emergency department.      The patient will pursue further outpatient evaluation with the primary care physician or other designated or consulting physician as outlined in the discharge instructions. They are agreeable to this plan of care and follow-up instructions have been explained in detail. The patient has received these instructions in written format and have expressed an understanding of the discharge instructions. The patient is aware that any significant change in condition or worsening of symptoms should prompt an immediate return to this or the closest emergency department or call to 911.  I have explained discharge medications and the need for follow up with the patient/caretakers. This was also printed in the discharge instructions. Patient was discharged with the following medications and  follow up:      Medication List        New Prescriptions      HYDROcodone-acetaminophen 5-325 MG per tablet  Commonly known as: NORCO  Take 1 tablet by mouth Every 6 (Six) Hours As Needed for Moderate Pain.               Where to Get Your Medications        These medications were sent to [facility] - [place], [place] - [address] - [phone]  - [phone] FX  [address], [place] [address]      Phone: [phone]   HYDROcodone-acetaminophen 5-325 MG per tablet      Delia Cervantes, APRN  [address]  [place] [id number] [phone]    In 2 days      Been, Max GALVAN MD  [address]  [place] [address] [phone]      as scheduled       Final diagnoses:   Osteoarthritis of left hip, unspecified osteoarthritis type        ED Disposition       ED Disposition   Discharge    Condition   Stable    Comment   --               This medical record created using voice recognition software.             Deedee Manning MD  08/01/23 0156

## 2023-08-02 ENCOUNTER — TELEPHONE (OUTPATIENT)
Dept: INTERNAL MEDICINE | Facility: CLINIC | Age: 63
End: 2023-08-02

## 2023-08-02 LAB — QT INTERVAL: 329 MS

## 2023-08-02 NOTE — TELEPHONE ENCOUNTER
Provider: JAMES BREWER  Caller: SAMI RAMIREZ  Relationship to Patient: SELF  Pharmacy:   Phone Number: [phone]   Reason for Call: PATIENT FELL IN BATHROOM ON 8/1/23 WAS TRANSPORTED TO [facility], THEY DIDN'T DO AN XRAY. RELEASED HIM, BUT EMT WOULDN'T TAKE HIM HOME, THEY TRANSPORTED HIM TO [facility] WHERE THEY ADMITTED HIM AND SAID HE HAS CELLULITIS IN HIS RIGHT LEG AND IT LOOKS LIKE THERE IS A PIECE OF METAL IN THE TIP OF RIGHT FOOT.    PATIENT STATES HE NEEDS TO SEE JAMES SO HE CAN KEEP HIS HOME HEALTH.    HE WILL RESCHEDULE AS SOON AS HE DISCHARGED FROM [facility]

## 2023-08-10 ENCOUNTER — PATIENT OUTREACH (OUTPATIENT)
Dept: CASE MANAGEMENT | Facility: OTHER | Age: 63
End: 2023-08-10
Payer: MEDICARE

## 2023-08-21 ENCOUNTER — TELEPHONE (OUTPATIENT)
Dept: WOUND CARE | Facility: HOSPITAL | Age: 63
End: 2023-08-21
Payer: MEDICARE

## 2023-08-21 NOTE — TELEPHONE ENCOUNTER
PATIENT CALLED AND WOULD LIKE US TO LIFT HIS NON-WEIGHT BEARING RESTRICTION SO THAT HE CAN DO PHYSICAL THERAPY. HE IS UNABLE TO WALK AND CAN BARELY GET OUT OF HIS WHEELCHAIR BY HIMSELF, SO THEY ARE WANTING TO WORK WITH HIM TO IMPROVE THAT, BUT CAN'T IF HE IS NON-WEIGHT BEARING. (IF WE DO MAKE THIS CHANGE, HE REQUESTS WE LET  VICENTE KNOW)    I ADVISED HIM THAT HE NEEDED TO MAKE A FOLLOW-UP APPOINTMENT, BUT PATIENT CALLED BACK AND LEFT A MESSAGE STATING THAT HE HAS A LOT GOING ON RIGHT NOW, AND WASN'T SURE WHEN HE WOULD BE ABLE TO COME IN.

## 2023-08-21 NOTE — SIGNIFICANT NOTE
Epithelial %: 0%    Exposed Bone: no    Exposed Tendon: no    Impression: unchanged    Short Term Goals: INCREASE GRANULATION, DECREASE SIZE         Detail Level: Generalized Detail Level: Detailed

## 2023-08-24 ENCOUNTER — OUTSIDE FACILITY SERVICE (OUTPATIENT)
Dept: INTERNAL MEDICINE | Facility: CLINIC | Age: 63
End: 2023-08-24
Payer: MEDICARE

## 2023-08-25 ENCOUNTER — PATIENT OUTREACH (OUTPATIENT)
Dept: CASE MANAGEMENT | Facility: OTHER | Age: 63
End: 2023-08-25
Payer: MEDICARE

## 2023-08-25 NOTE — OUTREACH NOTE
AMBULATORY CASE MANAGEMENT NOTE    Name and Relationship of Patient/Support Person: Jose Shaikh - Self    Patient Outreach    Patient currently at [facility] for Rehab since discharge from hospital.     Patient on non weight bearing status, unable to participate in rehab until her sees Wound Care on 8/29/23.       Will continue to follow until discharge home and will reevaluate needs once home.       Ella RAMOS  Ambulatory Case Management    8/25/2023, 11:41 EDT

## 2023-08-29 ENCOUNTER — OFFICE VISIT (OUTPATIENT)
Dept: WOUND CARE | Facility: HOSPITAL | Age: 63
End: 2023-08-29
Payer: MEDICARE

## 2023-08-29 VITALS
SYSTOLIC BLOOD PRESSURE: 112 MMHG | HEART RATE: 93 BPM | TEMPERATURE: 97 F | RESPIRATION RATE: 18 BRPM | DIASTOLIC BLOOD PRESSURE: 60 MMHG

## 2023-08-29 DIAGNOSIS — E11.42 DIABETIC POLYNEUROPATHY ASSOCIATED WITH TYPE 2 DIABETES MELLITUS: ICD-10-CM

## 2023-08-29 DIAGNOSIS — G89.29 CHRONIC LEFT HIP PAIN: ICD-10-CM

## 2023-08-29 DIAGNOSIS — Z89.431 STATUS POST AMPUTATION OF RIGHT FOOT THROUGH METATARSAL BONE: ICD-10-CM

## 2023-08-29 DIAGNOSIS — E66.01 CLASS 3 SEVERE OBESITY DUE TO EXCESS CALORIES WITH SERIOUS COMORBIDITY AND BODY MASS INDEX (BMI) OF 40.0 TO 44.9 IN ADULT: ICD-10-CM

## 2023-08-29 DIAGNOSIS — E11.621 DIABETIC ULCER OF LEFT HEEL ASSOCIATED WITH TYPE 2 DIABETES MELLITUS, LIMITED TO BREAKDOWN OF SKIN: ICD-10-CM

## 2023-08-29 DIAGNOSIS — L97.421 DIABETIC ULCER OF LEFT HEEL ASSOCIATED WITH TYPE 2 DIABETES MELLITUS, LIMITED TO BREAKDOWN OF SKIN: ICD-10-CM

## 2023-08-29 DIAGNOSIS — T81.31XA POSTOPERATIVE WOUND DEHISCENCE, INITIAL ENCOUNTER: Primary | ICD-10-CM

## 2023-08-29 DIAGNOSIS — M25.552 CHRONIC LEFT HIP PAIN: ICD-10-CM

## 2023-08-29 PROCEDURE — 3078F DIAST BP <80 MM HG: CPT | Performed by: EMERGENCY MEDICINE

## 2023-08-29 PROCEDURE — 1159F MED LIST DOCD IN RCRD: CPT | Performed by: EMERGENCY MEDICINE

## 2023-08-29 PROCEDURE — 3074F SYST BP LT 130 MM HG: CPT | Performed by: EMERGENCY MEDICINE

## 2023-08-29 PROCEDURE — 1160F RVW MEDS BY RX/DR IN RCRD: CPT | Performed by: EMERGENCY MEDICINE

## 2023-08-29 NOTE — PROGRESS NOTES
Chief Complaint  Wound Check (FOLLOW-UP ON BILATERAL FOOT WOUNDS (BS: 178))    Subjective      Wound Check      Jose Shaikh  is a 62 y.o. diabetic male with a right MTT amputation that is healing by secondary intention.  He also has a wound on the left plantar foot.  He has a difficult social situation due to financial constraints and housing issues.  Patient has a remote prior felony sex offender conviction which makes finding him housing or rehab placement challenging as most facilities turn him down.     He was admitted to the hospital from 10/01 to 10/05/2022 for cellulitis and osteomyelitis of the right foot.  MRI revealed osteomyelitis of bones of the midfoot.  CTA showed two-vessel runoff to the foot.  Dr. Fuller did not feel that there was any vascular intervention that would help the patient heal better and that he would most likely need to proceed to a right sided BKA.  Patient declined.  He got 4 weeks of doxycycline and Augmentin.  He has previously undergone hyperbaric oxygen therapy and did not want to do so again.    He was admitted from 04/03 to 04/06/2023.  Once again a right-sided BKA was recommended as it is evident that the right-sided amputation is not going to heal.  The patient declined this again because they could not guarantee him rehab placement.  He received a 1 month supply of Cipro and doxycycline.    Unfortunately, the patient has a lot of other issues and comorbidities including severe joint pain and arthritis.  He was admitted to the hospital from 05/12 to 06/15//2023 and then again from 06/16 to 06/23/2023.  They were able to get him rehab placement since he is unable to care for himself and he is now residing at [facility].  He says he wants to work hard to get his strength back so he can move back home because he does not want to end up staying there as a nursing home patient long-term.     Patient has been nonweightbearing on his lower extremities due to the wounds.  He  says Angie has been applying Betadine to his wounds but he told them they do not need it.  He continues to have severe chronic left hip pain as well as knee pain.  He is not sure what can be done for him and is supposed to see his orthopedic surgeon again soon.  His pain is so severe today that he is unable to get out of the wheelchair or even raise his left foot up high enough to see it.  We used a mirror to be able to examine the bottom of his left foot.    Patient would like to have orders that he can weight-bear as tolerated on his feet so he can begin doing more aggressive physical therapy.    Allergies:  Adhesive tape      Current Outpatient Medications:     acetaminophen (TYLENOL) 650 MG 8 hr tablet, Take 1 tablet by mouth Every 8 (Eight) Hours As Needed for Mild Pain., Disp: , Rfl:     apixaban (ELIQUIS) 5 MG tablet tablet, Take 1 tablet by mouth Every 12 (Twelve) Hours., Disp: 180 tablet, Rfl: 1    cyclobenzaprine (FLEXERIL) 5 MG tablet, Take 1 tablet by mouth 2 (Two) Times a Day As Needed for Muscle Spasms., Disp: , Rfl:     digoxin (LANOXIN) 125 MCG tablet, Take 1 tablet by mouth Daily for 30 days., Disp: 30 tablet, Rfl: 0    fluticasone (FLONASE) 50 MCG/ACT nasal spray, 2 sprays into the nostril(s) as directed by provider Daily., Disp: , Rfl:     HumaLOG KwikPen 100 UNIT/ML solution pen-injector, INJECT 30 UNITS UNDER THE SKIN INTO THE APPROPRIATE AREA AS DIRECTED 3 (THREE) TIMES A DAY BEFORE MEALS., Disp: 45 mL, Rfl: 1    HYDROcodone-acetaminophen (NORCO) 5-325 MG per tablet, Take 1 tablet by mouth Every 6 (Six) Hours As Needed for Moderate Pain., Disp: 12 tablet, Rfl: 0    insulin detemir (Levemir FlexPen) 100 UNIT/ML injection, Inject 40 Units under the skin into the appropriate area as directed 2 (Two) Times a Day for 30 days., Disp: 30 mL, Rfl: 0    Insulin Lispro (humaLOG) 100 UNIT/ML injection, Inject 10 Units under the skin into the appropriate area as directed 3 (Three) Times a Day With  Meals., Disp: , Rfl: 12    metFORMIN (GLUCOPHAGE) 1000 MG tablet, Take 1 tablet by mouth 2 (Two) Times a Day With Meals., Disp: 360 tablet, Rfl: 1    metoprolol succinate XL (TOPROL-XL) 25 MG 24 hr tablet, Take 1 tablet by mouth 2 (Two) Times a Day., Disp: , Rfl:     naloxone (NARCAN) 4 MG/0.1ML nasal spray, Call 911. Don't prime. [place] in 1 nostril for overdose. Repeat in 2-3 minutes in other nostril if no or minimal breathing/responsiveness., Disp: 2 each, Rfl: 0    pregabalin (LYRICA) 25 MG capsule, Take 1 capsule by mouth 3 (Three) Times a Day for 3 days., Disp: 9 capsule, Rfl: 0    simvastatin (ZOCOR) 20 MG tablet, Take 1 tablet by mouth Every Night., Disp: 90 tablet, Rfl: 1    Trulicity 3 MG/0.5ML solution pen-injector, INJECT ONE SYRINGE UNDER THE SKIN ONCE WEEKLY, Disp: 2 mL, Rfl: 1    Past Medical History:   Diagnosis Date    Absence of toe of right foot     Acute osteomyelitis of left calcaneus  8/18/2021    Anxiety and depression     Arthritis     Claustrophobia     Corns and callus     Diabetic ulcer of left heel associated with type 2 DM 8/18/2021    Diabetic ulcer of left heel associated with type 2 DM 7/6/2021    Diabetic ulcer of right midfoot associated with type 2 DM 8/18/2021    Difficulty walking     Essential hypertension 8/31/2021    Hammertoe     Hyperlipidemia LDL goal <100 8/31/2021    Ingrown toenail     Obesity     Paroxysmal atrial fibrillation 8/31/2021    Polyneuropathy     Pressure ulcer, stage 1     Tinea unguium     Type 2 diabetes mellitus with polyneuropathy      Past Surgical History:   Procedure Laterality Date    CYST REMOVAL      center of back; benign    INCISION AND DRAINAGE ABSCESS      back    INCISION AND DRAINAGE LEG Right 12/10/2021    Procedure: INCISION AND DRAINAGE LOWER EXTREMITY;  Surgeon: Ash Leyva DPM;  Location: [facility] MAIN OR;  Service: Podiatry;  Laterality: Right;    OTHER SURGICAL HISTORY      Surgical clips left foot    TOE SURGERY Right      Removal of 5th toe    TRANS METATARSAL AMPUTATION Right 2021    Procedure: AMPUTATION TRANS METATARSAL;  Surgeon: Ash Leyva DPM;  Location: [facility] MAIN OR;  Service: Podiatry;  Laterality: Right;    WRIST SURGERY Left     repair of injury     Social History     Socioeconomic History    Marital status:    Tobacco Use    Smoking status: Former     Packs/day: 0.00     Years: 1.00     Pack years: 0.00     Types: Cigarettes     Quit date: 1991     Years since quittin.0    Smokeless tobacco: Never    Tobacco comments:     quit at age 30   Vaping Use    Vaping Use: Never used   Substance and Sexual Activity    Alcohol use: Yes     Comment: rare    Drug use: Yes     Types: Marijuana     Comment: occasionally    Sexual activity: Defer           Objective     Vitals:    23 1028   BP: 112/60   BP Location: Right arm   Patient Position: Sitting   Pulse: 93   Resp: 18   Temp: 97 øF (36.1 øC)   TempSrc: Temporal   PainSc: 0-No pain     There is no height or weight on file to calculate BMI.     STEADI Fall Risk Assessment has not been completed.     Review of Systems     ROS:  No fevers, chills, sweats, or body aches.     I have reviewed the HPI and ROS as documented by MA/RN. Shea Daniels MD    Physical Exam     NAD  AAOx3, pleasant, cooperative  LLE: Left plantar wound is very difficult to assess due to limited mobility from the patient's severe left hip pain.  He is unable to stand and transfer to the examination chair.  He is unable to elevate his left foot up high enough to examine the bottom of it.  I was able to crouch down and partially visualize the wound as well as then look at it in a mirror.  There is no evidence of infection.  There is a thick dry callus centrally and no obvious open wound although I suspect there is still a wound under the callus.  There is central hemosiderin deposition.  No debridement able to be performed due to decreased ROM of proximal LLE.  RLE:  Right foot wound is once again significantly improved and almost fully healed.  There is recurrent firm dry callus along the plantar mid and lateral foot.  There are some peeling areas of dried callus also present.  There is some deep tissue injury along the dorsal aspect of the healed TMA incision and a small area of old blood centrally but no obvious open wound in that area.    Skin of both feet and legs is still dry and flaking but slightly improved.  No evidence of acute infection of either foot.    See photos for details.    RLE:          LLE:            Result Review :  The following data was reviewed by: Shea Daniels MD on 08/29/2023:    Prior notes and images.  ED note x2, left hip x-ray, and left knee x-ray      Wound debridement  Performed by: Shea Daniels MD  Authorized by: Shea Daniels MD     Correct patient: Identification verified by two methods:     Verbally and Date of birth  Correct procedure/consent    Correct site/side    Correct equipment as applicable    How many wounds are you performing a debridement on?:  1  Wound 1:     Nursing Documentation:     Wound Location:  Right foot  Measurements:     The total amount debrided on this wound was under 20 cm2.     Provider Documentation    Debridement type:  Sharp/excisional    Betadine      Other:  Sterile 10 blade     None    Tissue debrided:  Moderate    Level removed:  Subcutaneous    Patient tolerance:  Good    Hemostasis achieved by:  Silver nitrate    Wound Bed Post Debridement: See Photo             Assessment and Plan   Diagnoses and all orders for this visit:    1. Postoperative wound dehiscence, initial encounter (Primary)  -     Wound debridement    2. Diabetic ulcer of left heel associated with type 2 diabetes mellitus, limited to breakdown of skin    3. Class 3 severe obesity due to excess calories with serious comorbidity and body mass index (BMI) of 40.0 to 44.9 in adult    4. Diabetic polyneuropathy associated with type 2  diabetes mellitus    5. Chronic left hip pain    6. Status post amputation of right foot through metatarsal bone      Patient's wounds are improved.  The right foot wound is almost fully healed.  The left plantar wound is callused over but unable to be debrided today due to the patient's limitations from left hip and knee pain.  They can apply Aquaphor moisturizer to the left plantar foot callus and any other areas of dry skin.  Betadine twice daily to right foot wound.    Patient can begin weightbearing as tolerated BLE but wounds should be monitored closely for breakdown.  Recommend padding the plantar proximal aspect of the right foot and heel when the patient is to ambulate to try and help offload the distal wound and prevent recurrent breakdown.    Recheck 8 weeks, sooner if problems arise.    Patient was given instructions and counseling regarding their condition or for health maintenance advice, as well as the wound care plan and recommendations. They understand and agree with the plan.  They will follow back up here in the clinic but are instructed to contact us in the interim should they have any new, returning, or worsening symptoms or concerns. Please see specific information pulled into the AVS if appropriate.     Dragon Dictation utilized for chart completion.      Follow Up   Return in about 8 weeks (around 10/24/2023).      Shea Daniels MD

## 2023-08-30 ENCOUNTER — OFFICE VISIT (OUTPATIENT)
Dept: ORTHOPEDIC SURGERY | Facility: CLINIC | Age: 63
End: 2023-08-30
Payer: MEDICARE

## 2023-08-30 VITALS — HEIGHT: 74 IN | WEIGHT: 315 LBS | BODY MASS INDEX: 40.43 KG/M2

## 2023-08-30 DIAGNOSIS — E66.01 OBESITY, MORBID, BMI 40.0-49.9: ICD-10-CM

## 2023-08-30 DIAGNOSIS — M25.552 LEFT HIP PAIN: Primary | ICD-10-CM

## 2023-08-30 RX ORDER — TRAMADOL HYDROCHLORIDE 50 MG/1
50 TABLET ORAL EVERY 6 HOURS PRN
Qty: 28 TABLET | Refills: 0 | Status: SHIPPED | OUTPATIENT
Start: 2023-08-30

## 2023-08-30 NOTE — PROGRESS NOTES
"Chief Complaint  Initial Evaluation of the Left Leg     Subjective      Jose Shaikh presents to [facility] for initial evaluation of the left leg.  He dislocated his hip in .  He has had hip pain in the left leg for years.  He had a fall in the bathroom on 23 and is in a rehab facility at this time for strengthening.   His left legs feel like a 50 # bag of cement when he tries to lift it. He has had no recent injury to the left leg.  He states he is in pain all the time. He is unsure of his living situation after leaving the nursing home. He is diabetic and has some loss of toes on the right foot.  He had ulcers on the left foot that has healed up.      Allergies   Allergen Reactions    Adhesive Tape Rash        Social History     Socioeconomic History    Marital status:    Tobacco Use    Smoking status: Former     Packs/day: 0.00     Years: 1.00     Pack years: 0.00     Types: Cigarettes     Quit date: 1991     Years since quittin.0    Smokeless tobacco: Never    Tobacco comments:     quit at age 30   Vaping Use    Vaping Use: Never used   Substance and Sexual Activity    Alcohol use: Yes     Comment: rare    Drug use: Yes     Types: Marijuana     Comment: occasionally    Sexual activity: Defer        I reviewed the patient's chief complaint, history of present illness, review of systems, past medical history, surgical history, family history, social history, medications, and allergy list.     Review of Systems     Constitutional: Denies fevers, chills, weight loss  Cardiovascular: Denies chest pain, shortness of breath  Skin: Denies rashes, acute skin changes  Neurologic: Denies headache, loss of consciousness        Vital Signs:   Ht 188 cm (74\")   Wt (!) 157 kg (347 lb)   BMI 44.55 kg/mý          Physical Exam  General: Alert. No acute distress    Ortho Exam        LEFT HIP  Limited hip flexion.  He can do a straight leg raise. No skin discoloration, " atrophy or swelling. Positive EHL, FHL, GS, and TA. Sensation intact to all 5 nerves of the foot. Positive straight leg raise. Leg lengths appear equal. Ambulates with Antalgic gait Negative Syed. Negative Lex. Good strength to hamstrings, quadriceps, dorsiflexors, and plantar flexors. Knee Extensor Mechanism  intact         Procedures        Imaging Results (Most Recent)       None             Result Review :         XR Knee 3 View Left    Result Date: 8/1/2023  Narrative: PROCEDURE: XR KNEE 3 VW LEFT  COMPARISON: 12/20/2022.  INDICATIONS: LEFT KNEE PAIN; NO KNOWN INJURY.  FINDINGS: Four nonweightbearing views of the left knee were obtained.  The views are limited.  The best possible images were obtained.  External artifacts particularly obscure detail.  No acute fracture or acute malalignment is identified.  Generalized osteopenia is suggested.  Minimal, if any, left knee joint effusion is suggested.  Moderate-to-severe tricompartmental osteoarthritis is suspected.  Diffuse soft tissue swelling is suggested.  There are suspected arterial calcifications.  No subcutaneous emphysema.  No retained radiopaque foreign body.  If symptoms or clinical concerns persist, consider imaging follow-up.      Impression:  No acute fracture or acute malalignment is identified.     Please note that portions of this note were completed with a voice recognition program.  MALIK BELLA JR, MD       Electronically Signed and Approved By: MALIK BELLA JR, MD on 8/01/2023 at 1:30              CT Head Without Contrast    Result Date: 8/1/2023  Narrative: PROCEDURE: CT HEAD WO CONTRAST  COMPARISON:  None INDICATIONS: hit back of head on bathtub  PROTOCOL:   Standard imaging protocol performed    RADIATION:   DLP: 1145.2mGy*cm   MA and/or KV was adjusted to minimize radiation dose.     TECHNIQUE: After obtaining the patient's consent, CT images were obtained without non-ionic intravenous contrast material.  FINDINGS:  There is no acute  displaced calvarial fracture.  There is no acute intracranial hemorrhage, mass lesion, mass effect, midline shift or evidence of acute or evolving infarct.  The ventricular configuration is normal.  Paranasal sinuses and mastoid air cells are clear.  Mild intracranial carotid artery calcifications are present.  Impression:  No acute intracranial findings.       ART CAMPOVERDE MD       Electronically Signed and Approved By: ART CAMPOVERDE MD on 8/01/2023 at 11:12             XR Chest 1 View    Result Date: 8/1/2023  Narrative: PROCEDURE: XR CHEST 1 VW  COMPARISON: [facility], CR, XR CHEST 1 VW, 3/27/2023, 19:35.  [facility], CR, XR CHEST 1 VW, 5/12/2023, 14:14.  INDICATIONS: Weak/Dizzy/AMS triage protocol  FINDINGS:  No new consolidations or pleural effusions are observed. The cardiac silhouette and mediastinum are unchanged. No definitive acute osseous abnormalities are seen on this single view.      Impression:   1. No change from the previous study with no evidence for acute cardiopulmonary process.         ELDER GARRISON MD       Electronically Signed and Approved By: ELDER GARRISON MD on 8/01/2023 at 10:21             XR tibia fibula 2 views right    Result Date: 8/1/2023  Narrative: Right tibia-fibula radiography ACCESSION NUMBER: [id number] DATE: 8/1/2023 16:17 PROVIDED INDICATION: Osteomyelitis evaluation. COMPARISON: None TECHNIQUE: AP and lateral radiographs of the right tibia and fibula. FINDINGS: EXAM QUALITY: Adequate for interpretation. BONES: No evidence of an acute fracture, cortical erosion or bone destruction. Postoperative changes from prior midfoot amputation JOINTS: Negative for dislocation. Mild-to-moderate tricompartmental degenerative change of the right knee includes joint space loss, spurring of the tibial spines, and marginal osteophytosis. SOFT TISSUES: Linear radiopaque foreign body measuring approximately 1.5 cm within the plantar soft tissues of the heel.  "Diffuse soft tissue edema. OTHER: No additional relevant findings. IMPRESSION: 1. Prior right midfoot amputation without definitive radiographic evidence of active osteomyelitis. 2. Linear radiopaque foreign body measuring approximately 1.5 cm within the plantar soft tissues of the heel. \"I, the attending/teaching physician, have personally reviewed, discussed, and supervised this radiological examination with the resident and this report reflects my agreement.\" Dictated by: Summer Mittal M.D. Signed by Merlin Ramsay M.D. on 8/1/2023 16:57 ##### Final ##### Dictated by:    SUMMER MITTAL MD-TAHIR Dictated DT/TM: 08/01/2023 4:57 pm Interpreted and electronically signed by:  MERLIN RAMSAY MD-RAD Signed DT/TM:  08/01/2023 4:57 pm    XR Hip With or Without Pelvis 2 - 3 View Left    Result Date: 8/1/2023  Narrative: PROCEDURE: XR HIP W OR WO PELVIS 2-3 VIEW LEFT  COMPARISONS: 5/12/2023; 3/28/2023; 12/20/2022.  INDICATIONS: LEFT HIP PAIN; NO KNOWN INJURY.  FINDINGS: Three views were obtained.  Worsening osteoarthritis involves the left hip joint when compared with the prior 5/12/2023 exam.  Increased joint space narrowing is seen, especially superiorly.  Associated osteonecrosis of the left femoral head cannot be excluded.  No definite acute fracture or acute malalignment is suggested.  The other findings are as described in prior exam reports.      Impression:  Worsening (severe) osteoarthritis of the left hip joint is suggested.  No acute fracture.  No dislocation.     Please note that portions of this note were completed with a voice recognition program.  MALIK BELLA JR, MD       Electronically Signed and Approved By: MALIK BELLA JR, MD on 8/01/2023 at 1:27                      Assessment and Plan     Diagnoses and all orders for this visit:    1. Left hip pain (Primary)  -     MRI Hip Left Without Contrast; Future  -     traMADol (ULTRAM) 50 MG tablet; Take 1 tablet by mouth Every 6 (Six) Hours As " Needed for Moderate Pain.  Dispense: 28 tablet; Refill: 0    2. Obesity, morbid, BMI 40.0-49.9      Discussed the treatment plan with the patient.     Discussed intra articular injection.  Discussed weight loss of 20 - 30 pounds for possible candidate for left total hip arthroplasty      Prescribed Tramadol    Prescribed intra articular injection under radiology.      Call or return if worsening symptoms.    Follow Up     PRN      Patient was given instructions and counseling regarding his condition or for health maintenance advice. Please see specific information pulled into the AVS if appropriate.     Scribed for Max Parkinson MD by Tiff Yin MA.  08/30/23   11:26 EDT    I have personally performed the services described in this document as scribed by the above individual and it is both accurate and complete. Max Parkinson MD 08/30/23

## 2023-09-10 ENCOUNTER — HOSPITAL ENCOUNTER (INPATIENT)
Facility: HOSPITAL | Age: 63
LOS: 57 days | DRG: 554 | End: 2023-11-06
Attending: EMERGENCY MEDICINE | Admitting: FAMILY MEDICINE
Payer: MEDICARE

## 2023-09-10 ENCOUNTER — APPOINTMENT (OUTPATIENT)
Dept: CT IMAGING | Facility: HOSPITAL | Age: 63
DRG: 554 | End: 2023-09-10
Payer: MEDICARE

## 2023-09-10 DIAGNOSIS — Z78.9 DECREASED ACTIVITIES OF DAILY LIVING (ADL): ICD-10-CM

## 2023-09-10 DIAGNOSIS — R26.2 DIFFICULTY IN WALKING: ICD-10-CM

## 2023-09-10 DIAGNOSIS — R53.1 GENERALIZED WEAKNESS: Primary | ICD-10-CM

## 2023-09-10 DIAGNOSIS — E87.1 HYPONATREMIA: ICD-10-CM

## 2023-09-10 DIAGNOSIS — R26.2 DIFFICULTY WALKING: ICD-10-CM

## 2023-09-10 LAB
ALBUMIN SERPL-MCNC: 3.5 G/DL (ref 3.5–5.2)
ALBUMIN/GLOB SERPL: 1 G/DL
ALP SERPL-CCNC: 178 U/L (ref 39–117)
ALT SERPL W P-5'-P-CCNC: 30 U/L (ref 1–41)
ANION GAP SERPL CALCULATED.3IONS-SCNC: 11.4 MMOL/L (ref 5–15)
AST SERPL-CCNC: 40 U/L (ref 1–40)
BASOPHILS # BLD AUTO: 0.07 10*3/MM3 (ref 0–0.2)
BASOPHILS NFR BLD AUTO: 0.8 % (ref 0–1.5)
BILIRUB SERPL-MCNC: 1.2 MG/DL (ref 0–1.2)
BUN SERPL-MCNC: 8 MG/DL (ref 8–23)
BUN/CREAT SERPL: 13.8 (ref 7–25)
CALCIUM SPEC-SCNC: 9.6 MG/DL (ref 8.6–10.5)
CHLORIDE SERPL-SCNC: 87 MMOL/L (ref 98–107)
CO2 SERPL-SCNC: 22.6 MMOL/L (ref 22–29)
CREAT SERPL-MCNC: 0.58 MG/DL (ref 0.76–1.27)
DEPRECATED RDW RBC AUTO: 59.7 FL (ref 37–54)
DIGOXIN SERPL-MCNC: 0.44 NG/ML (ref 0.6–1.2)
EGFRCR SERPLBLD CKD-EPI 2021: 108.9 ML/MIN/1.73
EOSINOPHIL # BLD AUTO: 0.08 10*3/MM3 (ref 0–0.4)
EOSINOPHIL NFR BLD AUTO: 0.9 % (ref 0.3–6.2)
ERYTHROCYTE [DISTWIDTH] IN BLOOD BY AUTOMATED COUNT: 21.2 % (ref 12.3–15.4)
GLOBULIN UR ELPH-MCNC: 3.5 GM/DL
GLUCOSE SERPL-MCNC: 170 MG/DL (ref 65–99)
HCT VFR BLD AUTO: 44.3 % (ref 37.5–51)
HGB BLD-MCNC: 13.8 G/DL (ref 13–17.7)
IMM GRANULOCYTES # BLD AUTO: 0.03 10*3/MM3 (ref 0–0.05)
IMM GRANULOCYTES NFR BLD AUTO: 0.3 % (ref 0–0.5)
LYMPHOCYTES # BLD AUTO: 1.44 10*3/MM3 (ref 0.7–3.1)
LYMPHOCYTES NFR BLD AUTO: 16.2 % (ref 19.6–45.3)
MAGNESIUM SERPL-MCNC: 1.6 MG/DL (ref 1.6–2.4)
MCH RBC QN AUTO: 25 PG (ref 26.6–33)
MCHC RBC AUTO-ENTMCNC: 31.2 G/DL (ref 31.5–35.7)
MCV RBC AUTO: 80.3 FL (ref 79–97)
MONOCYTES # BLD AUTO: 0.7 10*3/MM3 (ref 0.1–0.9)
MONOCYTES NFR BLD AUTO: 7.9 % (ref 5–12)
NEUTROPHILS NFR BLD AUTO: 6.59 10*3/MM3 (ref 1.7–7)
NEUTROPHILS NFR BLD AUTO: 73.9 % (ref 42.7–76)
NRBC BLD AUTO-RTO: 0 /100 WBC (ref 0–0.2)
PLATELET # BLD AUTO: 156 10*3/MM3 (ref 140–450)
PMV BLD AUTO: 10.6 FL (ref 6–12)
POTASSIUM SERPL-SCNC: 4.1 MMOL/L (ref 3.5–5.2)
PROT SERPL-MCNC: 7 G/DL (ref 6–8.5)
RBC # BLD AUTO: 5.52 10*6/MM3 (ref 4.14–5.8)
SODIUM SERPL-SCNC: 121 MMOL/L (ref 136–145)
WBC NRBC COR # BLD: 8.91 10*3/MM3 (ref 3.4–10.8)

## 2023-09-10 PROCEDURE — 36415 COLL VENOUS BLD VENIPUNCTURE: CPT | Performed by: PHYSICIAN ASSISTANT

## 2023-09-10 PROCEDURE — 93010 ELECTROCARDIOGRAM REPORT: CPT | Performed by: INTERNAL MEDICINE

## 2023-09-10 PROCEDURE — 83735 ASSAY OF MAGNESIUM: CPT | Performed by: PHYSICIAN ASSISTANT

## 2023-09-10 PROCEDURE — 93005 ELECTROCARDIOGRAM TRACING: CPT | Performed by: EMERGENCY MEDICINE

## 2023-09-10 PROCEDURE — 80162 ASSAY OF DIGOXIN TOTAL: CPT | Performed by: EMERGENCY MEDICINE

## 2023-09-10 PROCEDURE — 80053 COMPREHEN METABOLIC PANEL: CPT | Performed by: PHYSICIAN ASSISTANT

## 2023-09-10 PROCEDURE — 99285 EMERGENCY DEPT VISIT HI MDM: CPT

## 2023-09-10 PROCEDURE — 85025 COMPLETE CBC W/AUTO DIFF WBC: CPT | Performed by: PHYSICIAN ASSISTANT

## 2023-09-10 RX ORDER — HYDROCODONE BITARTRATE AND ACETAMINOPHEN 7.5; 325 MG/1; MG/1
1 TABLET ORAL EVERY 4 HOURS PRN
Status: ON HOLD | COMMUNITY
Start: 2023-09-08

## 2023-09-10 NOTE — LETTER
[facility]  [address]  [place] [address]  [phone]        September 22, 2023      Patient: Jose Shaikh  YOB: 1960  Date of Visit: 9/10/2023    Referral for rehab to potential LTC.      Thank you,  Ella May, MSW  [phone]

## 2023-09-10 NOTE — LETTER
[facility]  [address]  [place] [address]  [phone]        September 22, 2023      Patient: Jose Shaikh  YOB: 1960  Date of Visit: 9/10/2023      Referral sent for rehab to potential LTC needs.    Thank you,  Ella May, MSW  [phone]

## 2023-09-10 NOTE — LETTER
[facility]  [address]  [place] [address]  [phone]        October 24, 2023      Patient: Jose Shaikh  YOB: 1960  Date of Visit: 9/10/2023    Rehab to LTC    Thank you,  Ella May, MSW  [phone]

## 2023-09-10 NOTE — LETTER
[facility]  [address]  [place] [address]  [phone]        October 6, 2023      Patient: Jose Shaikh  YOB: 1960  Date of Visit: 9/10/2023    Patient height and weight.    Thank you,  Ella May, MSPRERNA  [phone]

## 2023-09-10 NOTE — LETTER
[facility]  [address]  [place] [address]  [phone]        October 17, 2023      Patient: Jose Shaikh  YOB: 1960  Date of Visit: 9/10/2023    Referral for rehab placement-    Thank you,  Ella May, MSW  [phone]

## 2023-09-10 NOTE — LETTER
[facility]  [address]  [place] [address]  [phone]        October 17, 2023      Patient: Jose Shaikh  YOB: 1960  Date of Visit: 9/10/2023    Referral for rehab-    Thank you,  Ella May, MSW  [phone]

## 2023-09-10 NOTE — LETTER
[facility]  [address]  [place] [address]  [phone]        October 24, 2023      Patient: Jose Shaikh  YOB: 1960  Date of Visit: 9/10/2023    Rehab to LTC needs    Thank you,  Ella May, MSW  [phone]

## 2023-09-10 NOTE — LETTER
[facility]  [address]  [place] [address]  [phone]        October 17, 2023      Patient: Jose Shaikh  YOB: 1960  Date of Visit: 9/10/2023    Referral for rehab-    Thank you,  Ella May, MSW  [phone]

## 2023-09-10 NOTE — LETTER
[facility]  [address]  [place] [address]  [phone]        October 27, 2023      Patient: Jose Shaikh  YOB: 1960  Date of Visit: 9/10/2023      Rehab referral-    Thank you,  Ella May, MSW  [phone]

## 2023-09-10 NOTE — LETTER
[facility]  [address]  [place] [address]  [phone]        October 24, 2023      Patient: Jose Shaikh  YOB: 1960  Date of Visit: 9/10/2023    Rehab to LTC    Thank you,  Ella May, MSW  [phone]

## 2023-09-10 NOTE — LETTER
[facility]  [address]  [place] [address]  [phone]        October 20, 2023      Patient: Jose Shaikh  YOB: 1960  Date of Visit: 9/10/2023    LTC placement-    Thank you,  Ella May, MSW  [phone]

## 2023-09-10 NOTE — LETTER
[facility]  [address]  [place] [address]  [phone]        November 6, 2023      Patient: Jose Shaikh  YOB: 1960  Date of Visit: 9/10/2023    Medication list.    Thank you,  Ella May, MSW  [phone]

## 2023-09-10 NOTE — LETTER
[facility]  [address]  [place] [address]  [phone]        October 17, 2023      Patient: Jose Shaikh  YOB: 1960  Date of Visit: 9/10/2023    Rehab referral-    Thank you,  Ella May, MSW  [phone]

## 2023-09-10 NOTE — LETTER
[facility]  [address]  [place] [address]  [phone]        September 22, 2023      Patient: Jose Shaikh  YOB: 1960  Date of Visit: 9/10/2023    Referral for rehab to potential LTC.    Thank you,  Ella May, MSW  [phone]

## 2023-09-10 NOTE — ED PROVIDER NOTES
Time: 5:47 PM EDT  Date of encounter:  9/10/2023  Independent Historian/Clinical History and Information was obtained by:   Patient    History is limited by: N/A    Chief Complaint   Patient presents with    Weakness - Generalized         History of Present Illness:  Patient is a 62 y.o. year old male who presents to the emergency department for evaluation of Generalized weakness.  Patient has chronic left hip and left knee pain.  He states he was at [facility] for rehab and was just released.  He states he was sent home but when he got home was unable to even get out of the car.  He called EMS and was brought here.  He denies any chest pain, fever, chills, cough, congestion.  History pain is chronic and unchanged.        HPI    Patient Care Team  Primary Care Provider: Delia Cervantes APRN    Past Medical History:     Allergies   Allergen Reactions    Adhesive Tape Rash     Past Medical History:   Diagnosis Date    Absence of toe of right foot     Acute osteomyelitis of left calcaneus  8/18/2021    Anxiety and depression     Arthritis     Claustrophobia     Corns and callus     Diabetic ulcer of left heel associated with type 2 DM 8/18/2021    Diabetic ulcer of left heel associated with type 2 DM 7/6/2021    Diabetic ulcer of right midfoot associated with type 2 DM 8/18/2021    Difficulty walking     Essential hypertension 8/31/2021    Hammertoe     Hyperlipidemia LDL goal <100 8/31/2021    Ingrown toenail     Obesity     Paroxysmal atrial fibrillation 8/31/2021    Polyneuropathy     Pressure ulcer, stage 1     Tinea unguium     Type 2 diabetes mellitus with polyneuropathy      Past Surgical History:   Procedure Laterality Date    CYST REMOVAL      center of back; benign    INCISION AND DRAINAGE ABSCESS      back    INCISION AND DRAINAGE LEG Right 12/10/2021    Procedure: INCISION AND DRAINAGE LOWER EXTREMITY;  Surgeon: Ash Leyva DPM;  Location: [facility] MAIN OR;  Service: Podiatry;  Laterality: Right;     OTHER SURGICAL HISTORY      Surgical clips left foot    TOE SURGERY Right     Removal of 5th toe    TRANS METATARSAL AMPUTATION Right 12/2/2021    Procedure: AMPUTATION TRANS METATARSAL;  Surgeon: Ash Leyva DPM;  Location: [facility] MAIN OR;  Service: Podiatry;  Laterality: Right;    WRIST SURGERY Left     repair of injury     Family History   Problem Relation Age of Onset    Heart disease Mother     Heart disease Father     Cancer Father         Unspecified    Heart disease Brother        Home Medications:  Prior to Admission medications    Medication Sig Start Date End Date Taking? Authorizing Provider   acetaminophen (TYLENOL) 650 MG 8 hr tablet Take 1 tablet by mouth Every 8 (Eight) Hours As Needed for Mild Pain.    Provider, MD Laura   apixaban (ELIQUIS) 5 MG tablet tablet Take 1 tablet by mouth Every 12 (Twelve) Hours. 1/31/23   Delia Cervantes APRN   cyclobenzaprine (FLEXERIL) 5 MG tablet Take 1 tablet by mouth 2 (Two) Times a Day As Needed for Muscle Spasms. 6/23/23   Cailin cAosta MD   digoxin (LANOXIN) 125 MCG tablet Take 1 tablet by mouth Daily for 30 days. 6/15/23 7/15/23  Joe Haynes MD   fluticasone (FLONASE) 50 MCG/ACT nasal spray 2 sprays into the nostril(s) as directed by provider Daily.    Provider, MD Laura   HumaLOG KwikPen 100 UNIT/ML solution pen-injector INJECT 30 UNITS UNDER THE SKIN INTO THE APPROPRIATE AREA AS DIRECTED 3 (THREE) TIMES A DAY BEFORE MEALS. 7/19/23   Kami Garcia APRN   HYDROcodone-acetaminophen (NORCO) 5-325 MG per tablet Take 1 tablet by mouth Every 6 (Six) Hours As Needed for Moderate Pain. 8/1/23   Deedee Manning MD   insulin detemir (Levemir FlexPen) 100 UNIT/ML injection Inject 40 Units under the skin into the appropriate area as directed 2 (Two) Times a Day for 30 days. 6/23/23 7/23/23  Cailin Acosta MD   Insulin Lispro (humaLOG) 100 UNIT/ML injection Inject 10 Units under the skin into the appropriate area as  directed 3 (Three) Times a Day With Meals. 23   Cailin Acosta MD   metFORMIN (GLUCOPHAGE) 1000 MG tablet Take 1 tablet by mouth 2 (Two) Times a Day With Meals. 23   Delia Cervantes APRN   metoprolol succinate XL (TOPROL-XL) 25 MG 24 hr tablet Take 1 tablet by mouth 2 (Two) Times a Day. 23   Cailin Acosta MD   naloxone (NARCAN) 4 MG/0.1ML nasal spray Call 911. Don't prime. Loami in 1 nostril for overdose. Repeat in 2-3 minutes in other nostril if no or minimal breathing/responsiveness. 6/15/23   Joe Haynes MD   pregabalin (LYRICA) 25 MG capsule Take 1 capsule by mouth 3 (Three) Times a Day for 3 days. 6/15/23 6/18/23  Joe Haynes MD   simvastatin (ZOCOR) 20 MG tablet Take 1 tablet by mouth Every Night. 23   Delia Cervantes APRN   traMADol (ULTRAM) 50 MG tablet Take 1 tablet by mouth Every 6 (Six) Hours As Needed for Moderate Pain. 23   Max Parkinson MD   Trulicity 3 MG/0.5ML solution pen-injector INJECT ONE SYRINGE UNDER THE SKIN ONCE WEEKLY 23   Kami Garcia APRN        Social History:   Social History     Tobacco Use    Smoking status: Former     Packs/day: 0.00     Years: 1.00     Pack years: 0.00     Types: Cigarettes     Quit date: 1991     Years since quittin.0    Smokeless tobacco: Never    Tobacco comments:     quit at age 30   Vaping Use    Vaping Use: Never used   Substance Use Topics    Alcohol use: Yes     Comment: rare    Drug use: Yes     Types: Marijuana     Comment: occasionally         Review of Systems:  Review of Systems   Constitutional:  Negative for chills and fever.   HENT:  Negative for congestion, ear pain and sore throat.    Eyes:  Negative for pain.   Respiratory:  Negative for cough, chest tightness and shortness of breath.    Cardiovascular:  Negative for chest pain.   Gastrointestinal:  Negative for abdominal pain, diarrhea, nausea and vomiting.   Genitourinary:  Negative for flank pain and hematuria.  "  Musculoskeletal:  Positive for arthralgias and myalgias. Negative for joint swelling.   Skin:  Negative for pallor.   Neurological:  Positive for weakness (generalized). Negative for seizures, numbness and headaches.   All other systems reviewed and are negative.     Physical Exam:  BP 94/48 (BP Location: Right arm, Patient Position: Lying)   Pulse 59   Temp 97.9 °F (36.6 °C) (Oral)   Resp 18   Ht 188 cm (74\")   Wt (!) 151 kg (334 lb)   SpO2 96%   BMI 42.88 kg/m²         Physical Exam  Constitutional:       Appearance: Normal appearance. He is obese.   HENT:      Head: Normocephalic and atraumatic.      Nose: Nose normal.      Mouth/Throat:      Mouth: Mucous membranes are moist.   Eyes:      Extraocular Movements: Extraocular movements intact.      Conjunctiva/sclera: Conjunctivae normal.      Pupils: Pupils are equal, round, and reactive to light.   Cardiovascular:      Rate and Rhythm: Normal rate.      Heart sounds: Normal heart sounds.      Comments: Left DP/PT present with Doppler.  Pulmonary:      Effort: Pulmonary effort is normal.      Breath sounds: Normal breath sounds.   Abdominal:      General: There is no distension.      Palpations: Abdomen is soft.      Tenderness: There is no abdominal tenderness.   Musculoskeletal:         General: Normal range of motion.      Cervical back: Normal range of motion.      Comments: Unable to range left hip secondary to pain.  Prior right foot amputation.   Skin:     General: Skin is warm and dry.      Capillary Refill: Capillary refill takes less than 2 seconds.      Coloration: Skin is not cyanotic.      Comments: Small ulcerated wounds below pannus on the right.   Neurological:      General: No focal deficit present.      Mental Status: He is alert and oriented to person, place, and time. Mental status is at baseline.   Psychiatric:         Attention and Perception: Attention and perception normal.         Mood and Affect: Mood normal.         Behavior: " Behavior normal.                    Procedures:  Procedures      Medical Decision Making:      Comorbidities that affect care:    Atrial Fibrillation, Diabetes, Hypertension, Obesity    External Notes reviewed:    Previous Clinic Note: Patient was seen by orthopedic surgery on 8/30/2023 for left hip pain.  Plan was to obtain a MRI of the hip.      The following orders were placed and all results were independently analyzed by me:  Orders Placed This Encounter   Procedures    Comprehensive Metabolic Panel    Magnesium    CBC Auto Differential    Digoxin Level    Basic Metabolic Panel    Urinalysis With Culture If Indicated - Urine, Clean Catch    Diet: Diabetic Diets; Consistent Carbohydrate; Texture: Regular Texture (IDDSI 7); Fluid Consistency: Thin (IDDSI 0)    Vital Signs    Intake & Output    Weigh Patient    Oral Care    Saline Lock & Maintain IV Access    Inpatient Hospitalist Consult    Inpatient Case Management  Consult    PT Consult: Eval & Treat Functional Mobility Below Baseline    POC Glucose 4x Daily Before Meals & at Bedtime    ECG 12 Lead Electrolyte Imbalance    Consult to Wound / Ostomy Care    Insert Peripheral IV    Inpatient Admission    CBC & Differential    CBC & Differential       Medications Given in the Emergency Department:  Medications   morphine injection 4 mg (0 mg Intravenous Hold 9/10/23 2329)   sodium chloride 0.9 % flush 10 mL (10 mL Intravenous Given 9/11/23 0049)   sodium chloride 0.9 % flush 10 mL (has no administration in time range)   sodium chloride 0.9 % infusion 40 mL (has no administration in time range)   sennosides-docusate (PERICOLACE) 8.6-50 MG per tablet 2 tablet (2 tablets Oral Not Given 9/11/23 0049)     And   polyethylene glycol (MIRALAX) packet 17 g (has no administration in time range)     And   bisacodyl (DULCOLAX) EC tablet 5 mg (has no administration in time range)     And   bisacodyl (DULCOLAX) suppository 10 mg (has no administration in time  range)   ondansetron (ZOFRAN) injection 4 mg (has no administration in time range)   famotidine (PEPCID) tablet 40 mg (has no administration in time range)   sodium chloride 0.9 % infusion (100 mL/hr Intravenous New Bag 9/11/23 0047)   acetaminophen (TYLENOL) tablet 650 mg (has no administration in time range)   HYDROcodone-acetaminophen (NORCO) 7.5-325 MG per tablet 1 tablet (1 tablet Oral Given 9/11/23 0307)   dextrose (GLUTOSE) oral gel 15 g (has no administration in time range)   dextrose (D50W) (25 g/50 mL) IV injection 25 g (has no administration in time range)   glucagon (GLUCAGEN) injection 1 mg (has no administration in time range)   Insulin Lispro (humaLOG) injection 2-9 Units (has no administration in time range)        ED Course:    The patient was initially evaluated in the triage area where orders were placed. The patient was later dispositioned by Deedee Manning MD.      The patient was advised to stay for completion of workup which includes but is not limited to communication of labs and radiological results, reassessment and plan. The patient was advised that leaving prior to disposition by a provider could result in critical findings that are not communicated to the patient.     ED Course as of 09/11/23 0646   Mon Sep 11, 2023   0644 ECG 12 Lead Electrolyte Imbalance  Atrial fibrillation with rate of 100.  No acute ST elevation.  Nonspecific T wave abnormalities.  QTc of 491.  EKG interpreted by me. [LD]      ED Course User Index  [LD] Deedee Manning MD       Labs:    Lab Results (last 24 hours)       Procedure Component Value Units Date/Time    CBC & Differential [[id number]]  (Abnormal) Collected: 09/10/23 1802    Specimen: Blood from Arm, Right Updated: 09/10/23 1818    Narrative:      The following orders were created for panel order CBC & Differential.  Procedure                               Abnormality         Status                     ---------                                -----------         ------                     CBC Auto Differential[[id number]]        Abnormal            Final result                 Please view results for these tests on the individual orders.    Comprehensive Metabolic Panel [[id number]]  (Abnormal) Collected: 09/10/23 1802    Specimen: Blood from Arm, Right Updated: 09/10/23 1854     Glucose 170 mg/dL      BUN 8 mg/dL      Creatinine 0.58 mg/dL      Sodium 121 mmol/L      Potassium 4.1 mmol/L      Chloride 87 mmol/L      CO2 22.6 mmol/L      Calcium 9.6 mg/dL      Total Protein 7.0 g/dL      Albumin 3.5 g/dL      ALT (SGPT) 30 U/L      AST (SGOT) 40 U/L      Alkaline Phosphatase 178 U/L      Total Bilirubin 1.2 mg/dL      Globulin 3.5 gm/dL      A/G Ratio 1.0 g/dL      BUN/Creatinine Ratio 13.8     Anion Gap 11.4 mmol/L      eGFR 108.9 mL/min/1.73     Narrative:      GFR Normal >60  Chronic Kidney Disease <60  Kidney Failure <15      Magnesium [[id number]]  (Normal) Collected: 09/10/23 1802    Specimen: Blood from Arm, Right Updated: 09/10/23 1854     Magnesium 1.6 mg/dL     CBC Auto Differential [[id number]]  (Abnormal) Collected: 09/10/23 1802    Specimen: Blood from Arm, Right Updated: 09/10/23 1818     WBC 8.91 10*3/mm3      RBC 5.52 10*6/mm3      Hemoglobin 13.8 g/dL      Hematocrit 44.3 %      MCV 80.3 fL      MCH 25.0 pg      MCHC 31.2 g/dL      RDW 21.2 %      RDW-SD 59.7 fl      MPV 10.6 fL      Platelets 156 10*3/mm3      Neutrophil % 73.9 %      Lymphocyte % 16.2 %      Monocyte % 7.9 %      Eosinophil % 0.9 %      Basophil % 0.8 %      Immature Grans % 0.3 %      Neutrophils, Absolute 6.59 10*3/mm3      Lymphocytes, Absolute 1.44 10*3/mm3      Monocytes, Absolute 0.70 10*3/mm3      Eosinophils, Absolute 0.08 10*3/mm3      Basophils, Absolute 0.07 10*3/mm3      Immature Grans, Absolute 0.03 10*3/mm3      nRBC 0.0 /100 WBC     Digoxin Level [[id number]]  (Abnormal) Collected: 09/10/23 1802    Specimen: Blood from Arm, Right Updated: 09/10/23 2914      Digoxin 0.44 ng/mL     Basic Metabolic Panel [[id number]]  (Abnormal) Collected: 09/11/23 0523    Specimen: Blood from Arm, Right Updated: 09/11/23 0632     Glucose 178 mg/dL      BUN 8 mg/dL      Creatinine 0.45 mg/dL      Sodium 140 mmol/L      Potassium 3.7 mmol/L      Chloride 104 mmol/L      CO2 26.3 mmol/L      Calcium 8.6 mg/dL      BUN/Creatinine Ratio 17.8     Anion Gap 9.7 mmol/L      eGFR 117.6 mL/min/1.73     Narrative:      GFR Normal >60  Chronic Kidney Disease <60  Kidney Failure <15               Imaging:    No Radiology Exams Resulted Within Past 24 Hours      Differential Diagnosis and Discussion:      Weakness: Based on the patient's history, signs, and symptoms, the diffential diagnosis includes but is not limited to meningitis, stroke, sepsis, subarachnoid hemorrhage, intracranial bleeding, encephalitis, acute uti, dehydration, MS, myasthenia gravis, Guillan [place], migraine variant, neuromuscular disorders vertigo, electrolyte imbalance, and metabolic disorders.    All labs were reviewed and interpreted by me.    MDM  Number of Diagnoses or Management Options  Diagnosis management comments: Patient is afebrile nontoxic-appearing.  Vital signs are stable.  At time of evaluation he is lying in bed in no acute distress.  Patient reports chronic left hip pain.  This is unchanged.  He states he is unable to ambulate.  I am unable to even range left hip secondary to pain.  Patient states he lives alone and is unable to even get inside the house.  Labs showed low sodium of 121.  Glucose also elevated at 170.  Rest of labs showed no significant abnormality.  He denies any other symptoms.  I discussed patient with hospitalist and will admit for further care.       Amount and/or Complexity of Data Reviewed  Clinical lab tests: reviewed  Tests in the radiology section of CPT®: reviewed  Review and summarize past medical records: yes  Independent visualization of images, tracings, or specimens: yes    Risk  of Complications, Morbidity, and/or Mortality  Presenting problems: moderate  Management options: moderate             Patient Care Considerations:    CHEST X-RAY: I considered ordering a chest x-ray however no respiratory symptoms      Consultants/Shared Management Plan:    Hospitalist: I have discussed the case with Dr Dodson who agrees to accept the patient for admission.    Social Determinants of Health:    Patient is a nursing home/assisted living resident and has reliable access to care.      Disposition and Care Coordination:    Admit:   Through independent evaluation of the patient's history, physical, and imperical data, the patient meets criteria for observation/admission to the hospital.        Final diagnoses:   Generalized weakness   Hyponatremia        ED Disposition       ED Disposition   Decision to Admit    Condition   --    Comment   Level of Care: Med/Surg [1]   Diagnosis: Generalized weakness [164966]   Attending Physician: SAOLME DODSON [[id number]]   Certification: I Certify That Inpatient Hospital Services Are Medically Necessary For Greater Than 2 Midnights                 This medical record created using voice recognition software.             Deedee Manning MD  09/11/23 0646

## 2023-09-10 NOTE — LETTER
[facility]  [address]  [place] [address]  [phone]        October 27, 2023      Patient: Jose Shaikh  YOB: 1960  Date of Visit: 9/10/2023    Rehab referral-    Thank you,  Ella May, MSW  [phone]

## 2023-09-10 NOTE — LETTER
[facility]  [address]  [place] [address]  [phone]        October 27, 2023      Patient: Jose Shaikh  YOB: 1960  Date of Visit: 9/10/2023    Rehab referral-    Thank you,  Ella May, MSW  [phone]

## 2023-09-10 NOTE — LETTER
[facility]  [address]  [place] [address]  [phone]        October 20, 2023      Patient: Jose Shaikh  YOB: 1960  Date of Visit: 9/10/2023    C needed-    Thank you,  Ella May, MSW  [phone]

## 2023-09-10 NOTE — LETTER
[facility]  [address]  [place] [address]  [phone]        October 24, 2023      Patient: Jose Shaikh  YOB: 1960  Date of Visit: 9/10/2023      Referral for rehab to LTC needs.    Thank you,  Ella May, MSPRERNA  [phone]

## 2023-09-10 NOTE — LETTER
[facility]  [address]  [place] [address]  [phone]        October 17, 2023      Patient: Jose Shaikh  YOB: 1960  Date of Visit: 9/10/2023    Referral for rehab-    Thank you,  Ella May, MSW  [phone]

## 2023-09-10 NOTE — LETTER
[facility]  [address]  [place] [address]  [phone]        October 27, 2023      Patient: Jose Shaikh  YOB: 1960  Date of Visit: 9/10/2023    Rehab referral    Thank you,  Ella May, MSW  [phone]

## 2023-09-10 NOTE — LETTER
[facility]  [address]  [place] [address]  [phone]        September 27, 2023      Patient: Jose Shaikh  YOB: 1960  Date of Visit: 9/10/2023    Rehab referral-    Thank you,  Ella May, MSW  [phone]

## 2023-09-10 NOTE — Clinical Note
Level of Care: Med/Surg [1]   Diagnosis: Generalized weakness [670633]   Certification: I Certify That Inpatient Hospital Services Are Medically Necessary For Greater Than 2 Midnights

## 2023-09-10 NOTE — LETTER
[facility]  [address]  [place] [address]  [phone]        September 22, 2023      Patient: Jose Shaikh  YOB: 1960  Date of Visit: 9/10/2023    Rehab to potential LTC needs.    Thank you,  Ella May, MSW  [phone]

## 2023-09-10 NOTE — LETTER
[facility]  [address]  [place] [address]  [phone]        October 18, 2023      Patient: Jose Shaikh  YOB: 1960  Date of Visit: 9/10/2023    Updates therapy, nursing, and progress notes for auth.    Thank you,  Ella May, MSPRERNA  [phone]

## 2023-09-11 LAB
ANION GAP SERPL CALCULATED.3IONS-SCNC: 9.7 MMOL/L (ref 5–15)
BILIRUB UR QL STRIP: NEGATIVE
BUN SERPL-MCNC: 8 MG/DL (ref 8–23)
BUN/CREAT SERPL: 17.8 (ref 7–25)
CALCIUM SPEC-SCNC: 8.6 MG/DL (ref 8.6–10.5)
CHLORIDE SERPL-SCNC: 104 MMOL/L (ref 98–107)
CLARITY UR: CLEAR
CO2 SERPL-SCNC: 26.3 MMOL/L (ref 22–29)
COLOR UR: ABNORMAL
CREAT SERPL-MCNC: 0.45 MG/DL (ref 0.76–1.27)
EGFRCR SERPLBLD CKD-EPI 2021: 117.6 ML/MIN/1.73
GLUCOSE BLDC GLUCOMTR-MCNC: 143 MG/DL (ref 70–99)
GLUCOSE BLDC GLUCOMTR-MCNC: 186 MG/DL (ref 70–99)
GLUCOSE BLDC GLUCOMTR-MCNC: 191 MG/DL (ref 70–99)
GLUCOSE BLDC GLUCOMTR-MCNC: 277 MG/DL (ref 70–99)
GLUCOSE SERPL-MCNC: 178 MG/DL (ref 65–99)
GLUCOSE UR STRIP-MCNC: NEGATIVE MG/DL
HGB UR QL STRIP.AUTO: NEGATIVE
KETONES UR QL STRIP: NEGATIVE
LEUKOCYTE ESTERASE UR QL STRIP.AUTO: NEGATIVE
NITRITE UR QL STRIP: NEGATIVE
PH UR STRIP.AUTO: 6.5 [PH] (ref 5–8)
POTASSIUM SERPL-SCNC: 3.7 MMOL/L (ref 3.5–5.2)
PROT UR QL STRIP: NEGATIVE
QT INTERVAL: 380 MS
QTC INTERVAL: 491 MS
SODIUM SERPL-SCNC: 140 MMOL/L (ref 136–145)
SP GR UR STRIP: 1.01 (ref 1–1.03)
UROBILINOGEN UR QL STRIP: ABNORMAL
WHOLE BLOOD HOLD SPECIMEN: NORMAL

## 2023-09-11 PROCEDURE — 80048 BASIC METABOLIC PNL TOTAL CA: CPT | Performed by: FAMILY MEDICINE

## 2023-09-11 PROCEDURE — 99232 SBSQ HOSP IP/OBS MODERATE 35: CPT | Performed by: INTERNAL MEDICINE

## 2023-09-11 PROCEDURE — 81003 URINALYSIS AUTO W/O SCOPE: CPT | Performed by: FAMILY MEDICINE

## 2023-09-11 PROCEDURE — 82948 REAGENT STRIP/BLOOD GLUCOSE: CPT

## 2023-09-11 PROCEDURE — 63710000001 INSULIN LISPRO (HUMAN) PER 5 UNITS: Performed by: FAMILY MEDICINE

## 2023-09-11 RX ORDER — ACETAMINOPHEN 325 MG/1
650 TABLET ORAL EVERY 4 HOURS PRN
Status: DISCONTINUED | OUTPATIENT
Start: 2023-09-11 | End: 2023-11-06 | Stop reason: HOSPADM

## 2023-09-11 RX ORDER — BISACODYL 5 MG/1
5 TABLET, DELAYED RELEASE ORAL DAILY PRN
Status: DISCONTINUED | OUTPATIENT
Start: 2023-09-11 | End: 2023-10-08

## 2023-09-11 RX ORDER — AMOXICILLIN 250 MG
2 CAPSULE ORAL 2 TIMES DAILY
Status: DISCONTINUED | OUTPATIENT
Start: 2023-09-11 | End: 2023-10-08

## 2023-09-11 RX ORDER — FAMOTIDINE 20 MG/1
40 TABLET, FILM COATED ORAL DAILY
Status: DISCONTINUED | OUTPATIENT
Start: 2023-09-11 | End: 2023-11-06 | Stop reason: HOSPADM

## 2023-09-11 RX ORDER — HYDROCODONE BITARTRATE AND ACETAMINOPHEN 7.5; 325 MG/1; MG/1
1 TABLET ORAL EVERY 4 HOURS PRN
Status: DISCONTINUED | OUTPATIENT
Start: 2023-09-11 | End: 2023-10-02

## 2023-09-11 RX ORDER — ONDANSETRON 2 MG/ML
4 INJECTION INTRAMUSCULAR; INTRAVENOUS EVERY 6 HOURS PRN
Status: DISCONTINUED | OUTPATIENT
Start: 2023-09-11 | End: 2023-11-06 | Stop reason: HOSPADM

## 2023-09-11 RX ORDER — SODIUM CHLORIDE 9 MG/ML
100 INJECTION, SOLUTION INTRAVENOUS CONTINUOUS
Status: DISCONTINUED | OUTPATIENT
Start: 2023-09-11 | End: 2023-09-11

## 2023-09-11 RX ORDER — INSULIN LISPRO 100 [IU]/ML
2-9 INJECTION, SOLUTION INTRAVENOUS; SUBCUTANEOUS
Status: DISCONTINUED | OUTPATIENT
Start: 2023-09-11 | End: 2023-10-05

## 2023-09-11 RX ORDER — POLYETHYLENE GLYCOL 3350 17 G/17G
17 POWDER, FOR SOLUTION ORAL DAILY PRN
Status: DISCONTINUED | OUTPATIENT
Start: 2023-09-11 | End: 2023-10-08

## 2023-09-11 RX ORDER — SENNOSIDES 8.6 MG
650 CAPSULE ORAL EVERY 8 HOURS PRN
Status: DISCONTINUED | OUTPATIENT
Start: 2023-09-11 | End: 2023-09-11 | Stop reason: SDUPTHER

## 2023-09-11 RX ORDER — SODIUM CHLORIDE 0.9 % (FLUSH) 0.9 %
10 SYRINGE (ML) INJECTION AS NEEDED
Status: DISCONTINUED | OUTPATIENT
Start: 2023-09-11 | End: 2023-11-06 | Stop reason: HOSPADM

## 2023-09-11 RX ORDER — DEXTROSE MONOHYDRATE 25 G/50ML
25 INJECTION, SOLUTION INTRAVENOUS
Status: DISCONTINUED | OUTPATIENT
Start: 2023-09-11 | End: 2023-11-06 | Stop reason: HOSPADM

## 2023-09-11 RX ORDER — SODIUM CHLORIDE 0.9 % (FLUSH) 0.9 %
10 SYRINGE (ML) INJECTION EVERY 12 HOURS SCHEDULED
Status: DISCONTINUED | OUTPATIENT
Start: 2023-09-11 | End: 2023-11-06 | Stop reason: HOSPADM

## 2023-09-11 RX ORDER — SODIUM CHLORIDE 9 MG/ML
40 INJECTION, SOLUTION INTRAVENOUS AS NEEDED
Status: DISCONTINUED | OUTPATIENT
Start: 2023-09-11 | End: 2023-11-06 | Stop reason: HOSPADM

## 2023-09-11 RX ORDER — AMMONIUM LACTATE 12 G/100G
LOTION TOPICAL
Status: DISCONTINUED | OUTPATIENT
Start: 2023-09-11 | End: 2023-11-06 | Stop reason: HOSPADM

## 2023-09-11 RX ORDER — BACLOFEN 10 MG/1
10 TABLET ORAL 2 TIMES DAILY PRN
Status: DISCONTINUED | OUTPATIENT
Start: 2023-09-11 | End: 2023-10-10

## 2023-09-11 RX ORDER — NICOTINE POLACRILEX 4 MG
15 LOZENGE BUCCAL
Status: DISCONTINUED | OUTPATIENT
Start: 2023-09-11 | End: 2023-11-06 | Stop reason: HOSPADM

## 2023-09-11 RX ORDER — PREGABALIN 25 MG/1
25 CAPSULE ORAL 3 TIMES DAILY
Status: DISCONTINUED | OUTPATIENT
Start: 2023-09-11 | End: 2023-09-16

## 2023-09-11 RX ORDER — ATORVASTATIN CALCIUM 10 MG/1
10 TABLET, FILM COATED ORAL DAILY
Status: DISCONTINUED | OUTPATIENT
Start: 2023-09-11 | End: 2023-09-13

## 2023-09-11 RX ORDER — BISACODYL 10 MG
10 SUPPOSITORY, RECTAL RECTAL DAILY PRN
Status: DISCONTINUED | OUTPATIENT
Start: 2023-09-11 | End: 2023-10-08

## 2023-09-11 RX ADMIN — SODIUM CHLORIDE 100 ML/HR: 9 INJECTION, SOLUTION INTRAVENOUS at 00:47

## 2023-09-11 RX ADMIN — ATORVASTATIN CALCIUM 10 MG: 10 TABLET, FILM COATED ORAL at 14:08

## 2023-09-11 RX ADMIN — APIXABAN 5 MG: 5 TABLET, FILM COATED ORAL at 14:08

## 2023-09-11 RX ADMIN — Medication: at 16:41

## 2023-09-11 RX ADMIN — HYDROCODONE BITARTRATE AND ACETAMINOPHEN 1 TABLET: 7.5; 325 TABLET ORAL at 08:33

## 2023-09-11 RX ADMIN — INSULIN LISPRO 2 UNITS: 100 INJECTION, SOLUTION INTRAVENOUS; SUBCUTANEOUS at 14:08

## 2023-09-11 RX ADMIN — Medication 10 ML: at 08:34

## 2023-09-11 RX ADMIN — Medication 10 ML: at 21:11

## 2023-09-11 RX ADMIN — INSULIN LISPRO 6 UNITS: 100 INJECTION, SOLUTION INTRAVENOUS; SUBCUTANEOUS at 16:40

## 2023-09-11 RX ADMIN — HYDROCODONE BITARTRATE AND ACETAMINOPHEN 1 TABLET: 7.5; 325 TABLET ORAL at 16:41

## 2023-09-11 RX ADMIN — HYDROCODONE BITARTRATE AND ACETAMINOPHEN 1 TABLET: 7.5; 325 TABLET ORAL at 03:07

## 2023-09-11 RX ADMIN — BACLOFEN 10 MG: 10 TABLET ORAL at 16:19

## 2023-09-11 RX ADMIN — Medication 10 ML: at 00:49

## 2023-09-11 RX ADMIN — FAMOTIDINE 40 MG: 20 TABLET, FILM COATED ORAL at 08:33

## 2023-09-11 RX ADMIN — APIXABAN 5 MG: 5 TABLET, FILM COATED ORAL at 21:11

## 2023-09-11 NOTE — PAYOR COMM NOTE
"Sami Shaikh (62 y.o. Male)       Date of Birth   1960    Social Security Number       Address   [address] [place] [id number]    Home Phone   [phone]    MRN   [id number]       Gnosticism   None    Marital Status                               Admission Date   9/10/23    Admission Type   Emergency    Admitting Provider   Levi Finney MD    Attending Provider   Sami Maya MD    Department, Room/Bed   [address], 4027/1       Discharge Date       Discharge Disposition       Discharge Destination                                 Attending Provider: Sami Maya MD    Allergies: Adhesive Tape    Isolation: None   Infection: None   Code Status: Prior    Ht: 188 cm (74\")   Wt: 151 kg (334 lb)    Admission Cmt: None   Principal Problem: Generalized weakness [R53.1]                   Active Insurance as of 9/10/2023       Primary Coverage       Payor Plan Insurance Group Employer/Plan Group    [employer] MEDICARE REPLACEMENT [facility] DUAL COMPLETE MEDICARE REPLACEMENT [id number]       Payor Plan Address Payor Plan Phone Number Payor Plan Fax Number Effective Dates    [address] [phone]  5/1/2022 - None Entered    [facility] [address]         Subscriber Name Subscriber Birth Date Member ID       SAMI SHAIKH 1960 [id number]               Secondary Coverage       Payor Plan Insurance Group Employer/Plan Group    [place]        Payor Plan Address Payor Plan Phone Number Payor Plan Fax Number Effective Dates    [address] [phone]  6/22/2021 - None Entered    [facility] [id number]         Subscriber Name Subscriber Birth Date Member ID       SAMI SHAIKH 1960 [id number]                     Emergency Contacts        (Rel.) Home Phone Work Phone Mobile Phone    RAMONITA SHAIKH JR (Son) -- -- [phone]    MICHELLE SHAIKH (Daughter) -- -- [phone]    Michelle Chandra (Friend) -- -- " [phone]        [facility] # [id number] pended case    Ky Mcaid secondary insurance     AUTHORIZATION REQUEST for EMERGENCY DEPARTMENT ADMISSION        PATIENT INFORMATION  Name:             Jose Shaikh  MRN#:            [id number]        ADMISSION INFORMATION  CLASS:          Inpatient   DOS:               9/10/2023        ADMISSION DIAGNOSIS AND HOSPITAL PROBLEMS    Problems Addressed this Visit          Other    * (Principal) Generalized weakness - Primary     Other Visit Diagnoses       Hyponatremia              Diagnoses         Codes Comments    Generalized weakness    -  Primary ICD-10-CM: R53.1  ICD-9-CM: 780.79     Hyponatremia     ICD-10-CM: E87.1  ICD-9-CM: 276.1                  ADMITTING PROVIDER INFORMATION  Name/NPI       Jose Maya MD [[id number]]  Phone:            Phone: [phone] rendering FACILITY  Name:             [facility]   NPI:                 [id number]  TID:                 [id number]  Address:        [address] [place], [place] [address] [address]        CASE MANAGEMENT CONTACT INFORMATION  Name:             DAVID Ho  Phone:            [phone]  Fax:                [phone]         History & Physical        Levi Finney MD at 23 0237           [facility]   HISTORY AND PHYSICAL    Patient Name: Jose Shaikh  : 1960  MRN: [id number]  Primary Care Physician:  Delia Cervantes APRN  Date of admission: 9/10/2023    Subjective   Subjective     Chief Complaint: Generalized weakness    HPI:    Jose Shaikh is a 62 y.o. male with past medical history of diabetes with polyneuropathy and foot amputation, hypertension, chronic wounds, morbid obesity, anxiety/depression, and GERD presented via EMS with generalized weakness.  Patient was discharged from [facility] yesterday and stated that he could barely move or get out of his car so EMS was called to bring him to the ED.  Patient states that due to nonweightbearing restrictions that was  implemented by wound care during his rehab, physical therapy did not work on strengthening his left lower extremity.  Due to the restriction patient feels as though his rehab was not adequate resulted in him being unable to care for himself upon discharge.  In the ED patient's vitals were all within normal limits on arrival.  Labs showed that he did have significant hyponatremia with remaining labs being relatively unremarkable given his chronic conditions.  When asked he denied any recent fevers, chills, headaches, chest pain, palpitation, shortness of breath, cough, abdominal pain, nausea, vomiting, diarrhea, constipation, dysuria, hematuria, hematochezia, melena, or anxiety.  Patient was admitted for further evaluation and treatment.    Review of Systems   All systems were reviewed and negative except for: As specified in HPI    Personal History     Past Medical History:   Diagnosis Date    Absence of toe of right foot     Acute osteomyelitis of left calcaneus  8/18/2021    Anxiety and depression     Arthritis     Claustrophobia     Corns and callus     Diabetic ulcer of left heel associated with type 2 DM 8/18/2021    Diabetic ulcer of left heel associated with type 2 DM 7/6/2021    Diabetic ulcer of right midfoot associated with type 2 DM 8/18/2021    Difficulty walking     Essential hypertension 8/31/2021    Hammertoe     Hyperlipidemia LDL goal <100 8/31/2021    Ingrown toenail     Obesity     Paroxysmal atrial fibrillation 8/31/2021    Polyneuropathy     Pressure ulcer, stage 1     Tinea unguium     Type 2 diabetes mellitus with polyneuropathy        Past Surgical History:   Procedure Laterality Date    CYST REMOVAL      center of back; benign    INCISION AND DRAINAGE ABSCESS      back    INCISION AND DRAINAGE LEG Right 12/10/2021    Procedure: INCISION AND DRAINAGE LOWER EXTREMITY;  Surgeon: Ash Leyva DPM;  Location: [facility] OR;  Service: Podiatry;  Laterality: Right;    OTHER SURGICAL  HISTORY      Surgical clips left foot    TOE SURGERY Right     Removal of 5th toe    TRANS METATARSAL AMPUTATION Right 12/2/2021    Procedure: AMPUTATION TRANS METATARSAL;  Surgeon: Ash Leyva DPM;  Location: [facility] OR;  Service: Podiatry;  Laterality: Right;    WRIST SURGERY Left     repair of injury       Family History: family history includes Cancer in his father; Heart disease in his brother, father, and mother. Otherwise pertinent FHx was reviewed and not pertinent to current issue.    Social History:  reports that he quit smoking about 32 years ago. His smoking use included cigarettes. He has never used smokeless tobacco. He reports current alcohol use. He reports current drug use. Drug: Marijuana.    Home Medications:  Dulaglutide, HYDROcodone-acetaminophen, Insulin Lispro (1 Unit Dial), acetaminophen, apixaban, digoxin, insulin detemir, metFORMIN, metoprolol succinate XL, naloxone, pregabalin, and simvastatin      Allergies:  Allergies   Allergen Reactions    Adhesive Tape Rash       Objective   Objective     Vitals:   Temp:  [97.6 °F (36.4 °C)-98 °F (36.7 °C)] 98 °F (36.7 °C)  Heart Rate:  [] 83  Resp:  [18] 18  BP: ()/() 97/44  Physical Exam    Constitutional: Awake, alert   Eyes: PERRLA, sclerae anicteric, no conjunctival injection   HENT: NCAT, mucous membranes moist   Neck: Supple, no thyromegaly, no lymphadenopathy, trachea midline   Respiratory: Clear to auscultation bilaterally, nonlabored respirations    Cardiovascular: RRR, no murmurs, rubs, or gallops, palpable pedal pulses bilaterally   Gastrointestinal: Positive bowel sounds, soft, nontender, nondistended   Musculoskeletal: Transmetatarsal amputation, no clubbing or cyanosis to extremities   Psychiatric: Appropriate affect, cooperative   Neurologic: Oriented x 3, strength symmetric in all extremities, Cranial Nerves grossly intact to confrontation, speech clear   Skin: Healing lower extremity wounds    Result  Review    Result Review:  I have personally reviewed the results from the time of this admission to 9/11/2023 02:37 EDT and agree with these findings:  [x]  Laboratory list / accordion  []  Microbiology  []  Radiology  [x]  EKG/Telemetry   []  Cardiology/Vascular   []  Pathology  []  Old records  []  Other:  Most notable findings include: Hyponatremia      Assessment & Plan   Assessment / Plan     Brief Patient Summary:  Jose Shaikh is a 62 y.o. male who ith past medical history of diabetes with polyneuropathy and foot amputation, hypertension, chronic wounds, morbid obesity, anxiety/depression, and GERD presented via EMS with generalized weakness    Active Hospital Problems:  Active Hospital Problems    Diagnosis     **Generalized weakness     Type 2 diabetes mellitus, with long-term current use of insulin     Class 3 severe obesity due to excess calories with serious comorbidity and body mass index (BMI) of 45.0 to 49.9 in adult     Dependence on wheelchair     Foot pain, bilateral     Paroxysmal atrial fibrillation     Essential hypertension     Diabetic ulcer of left heel associated with type 2 DM      Plan:     Generalized weakness  -Admit to medical floor  -Discharged from inpatient rehab yesterday  -Patient unable to perform ADLs.   -Patient morbidly obese  -Fall precautions  -PT OT  -Wound care  -Case management consulted for likely placement  -Supportive care    Hyponatremia  -Patient euvolemic, asymptomatic  -Glucose mildly elevated  -Superimposed hypochloremia  -EKG reviewed  -Urinalysis pending  -Admits to diarrhea  -NS IVF for now  -Further work-up per clinical course  -Follow labs    DM2  -ISS  -Titrate as needed    HTN  -Currently well controlled  -PRN BP meds  -Resume home meds when available  -Titrate if needed    Hx A-fib  -Resume home AC when available    Hx CKD  Hx CAD  Hx Morbid Obesity  Hx Chronic Wound: Wound care consulted  Debility/Wheelchair dependence     GI ppx  DVT ppx      DVT  prophylaxis:  No DVT prophylaxis order currently exists.    CODE STATUS:       Admission Status:  I believe this patient meets inpatient status.      Electronically signed by Levi Finney MD, 09/11/23, 2:37 AM EDT.    Electronically signed by Levi Finney MD at 09/11/23 0654          Emergency Department Notes        Levi Celaya RN Extern at 09/10/23 2234          PT REQUESTING MEDICATION FOR MUSCLE SPASMS IN LEGS    Electronically signed by Levi Celaya RN Extern at 09/10/23 1825       Deedee Manning MD at 09/10/23 1747          Time: 5:47 PM EDT  Date of encounter:  9/10/2023  Independent Historian/Clinical History and Information was obtained by:   Patient    History is limited by: N/A    Chief Complaint   Patient presents with    Weakness - Generalized         History of Present Illness:  Patient is a 62 y.o. year old male who presents to the emergency department for evaluation of Generalized weakness.  Patient has chronic left hip and left knee pain.  He states he was at [facility] for rehab and was just released.  He states he was sent home but when he got home was unable to even get out of the car.  He called EMS and was brought here.  He denies any chest pain, fever, chills, cough, congestion.  History pain is chronic and unchanged.        HPI    Patient Care Team  Primary Care Provider: Delia Cervantes APRN    Past Medical History:     Allergies   Allergen Reactions    Adhesive Tape Rash     Past Medical History:   Diagnosis Date    Absence of toe of right foot     Acute osteomyelitis of left calcaneus  8/18/2021    Anxiety and depression     Arthritis     Claustrophobia     Corns and callus     Diabetic ulcer of left heel associated with type 2 DM 8/18/2021    Diabetic ulcer of left heel associated with type 2 DM 7/6/2021    Diabetic ulcer of right midfoot associated with type 2 DM 8/18/2021    Difficulty walking     Essential hypertension 8/31/2021    Hammertoe     Hyperlipidemia LDL  goal <100 8/31/2021    Ingrown toenail     Obesity     Paroxysmal atrial fibrillation 8/31/2021    Polyneuropathy     Pressure ulcer, stage 1     Tinea unguium     Type 2 diabetes mellitus with polyneuropathy      Past Surgical History:   Procedure Laterality Date    CYST REMOVAL      center of back; benign    INCISION AND DRAINAGE ABSCESS      back    INCISION AND DRAINAGE LEG Right 12/10/2021    Procedure: INCISION AND DRAINAGE LOWER EXTREMITY;  Surgeon: Ash Leyva DPM;  Location: [facility] OR;  Service: Podiatry;  Laterality: Right;    OTHER SURGICAL HISTORY      Surgical clips left foot    TOE SURGERY Right     Removal of 5th toe    TRANS METATARSAL AMPUTATION Right 12/2/2021    Procedure: AMPUTATION TRANS METATARSAL;  Surgeon: Ash Leyva DPM;  Location: [facility] MAIN OR;  Service: Podiatry;  Laterality: Right;    WRIST SURGERY Left     repair of injury     Family History   Problem Relation Age of Onset    Heart disease Mother     Heart disease Father     Cancer Father         Unspecified    Heart disease Brother        Home Medications:  Prior to Admission medications    Medication Sig Start Date End Date Taking? Authorizing Provider   acetaminophen (TYLENOL) 650 MG 8 hr tablet Take 1 tablet by mouth Every 8 (Eight) Hours As Needed for Mild Pain.    ProviderLaura MD   apixaban (ELIQUIS) 5 MG tablet tablet Take 1 tablet by mouth Every 12 (Twelve) Hours. 1/31/23   Delia Cervantes APRN   cyclobenzaprine (FLEXERIL) 5 MG tablet Take 1 tablet by mouth 2 (Two) Times a Day As Needed for Muscle Spasms. 6/23/23   Cailin Acosta MD   digoxin (LANOXIN) 125 MCG tablet Take 1 tablet by mouth Daily for 30 days. 6/15/23 7/15/23  Joe Haynes MD   fluticasone (FLONASE) 50 MCG/ACT nasal spray 2 sprays into the nostril(s) as directed by provider Daily.    ProviderLaura MD   HumaLOG KwikPen 100 UNIT/ML solution pen-injector INJECT 30 UNITS UNDER THE SKIN INTO THE APPROPRIATE AREA AS  DIRECTED 3 (THREE) TIMES A DAY BEFORE MEALS. 23   Kami Garcia APRN   HYDROcodone-acetaminophen (NORCO) 5-325 MG per tablet Take 1 tablet by mouth Every 6 (Six) Hours As Needed for Moderate Pain. 23   Deedee Manning MD   insulin detemir (Levemir FlexPen) 100 UNIT/ML injection Inject 40 Units under the skin into the appropriate area as directed 2 (Two) Times a Day for 30 days. 23  Cailin Acosta MD   Insulin Lispro (humaLOG) 100 UNIT/ML injection Inject 10 Units under the skin into the appropriate area as directed 3 (Three) Times a Day With Meals. 23   Cailin Acosta MD   metFORMIN (GLUCOPHAGE) 1000 MG tablet Take 1 tablet by mouth 2 (Two) Times a Day With Meals. 23   Delia Cervantes APRN   metoprolol succinate XL (TOPROL-XL) 25 MG 24 hr tablet Take 1 tablet by mouth 2 (Two) Times a Day. 23   Cailin Acosta MD   naloxone (NARCAN) 4 MG/0.1ML nasal spray Call 911. Don't prime. [place] in 1 nostril for overdose. Repeat in 2-3 minutes in other nostril if no or minimal breathing/responsiveness. 6/15/23   Joe Haynes MD   pregabalin (LYRICA) 25 MG capsule Take 1 capsule by mouth 3 (Three) Times a Day for 3 days. 6/15/23 6/18/23  Joe Haynes MD   simvastatin (ZOCOR) 20 MG tablet Take 1 tablet by mouth Every Night. 23   Delia Cervantes APRN   traMADol (ULTRAM) 50 MG tablet Take 1 tablet by mouth Every 6 (Six) Hours As Needed for Moderate Pain. 23   Max Parkinson MD   Trulicity 3 MG/0.5ML solution pen-injector INJECT ONE SYRINGE UNDER THE SKIN ONCE WEEKLY 23   Kami Garcia APRN        Social History:   Social History     Tobacco Use    Smoking status: Former     Packs/day: 0.00     Years: 1.00     Pack years: 0.00     Types: Cigarettes     Quit date: 1991     Years since quittin.0    Smokeless tobacco: Never    Tobacco comments:     quit at age 30   Vaping Use    Vaping Use: Never used   Substance Use Topics    Alcohol  "use: Yes     Comment: rare    Drug use: Yes     Types: Marijuana     Comment: occasionally         Review of Systems:  Review of Systems   Constitutional:  Negative for chills and fever.   HENT:  Negative for congestion, ear pain and sore throat.    Eyes:  Negative for pain.   Respiratory:  Negative for cough, chest tightness and shortness of breath.    Cardiovascular:  Negative for chest pain.   Gastrointestinal:  Negative for abdominal pain, diarrhea, nausea and vomiting.   Genitourinary:  Negative for flank pain and hematuria.   Musculoskeletal:  Positive for arthralgias and myalgias. Negative for joint swelling.   Skin:  Negative for pallor.   Neurological:  Positive for weakness (generalized). Negative for seizures, numbness and headaches.   All other systems reviewed and are negative.     Physical Exam:  BP 94/48 (BP Location: Right arm, Patient Position: Lying)   Pulse 59   Temp 97.9 °F (36.6 °C) (Oral)   Resp 18   Ht 188 cm (74\")   Wt (!) 151 kg (334 lb)   SpO2 96%   BMI 42.88 kg/m²         Physical Exam  Constitutional:       Appearance: Normal appearance. He is obese.   HENT:      Head: Normocephalic and atraumatic.      Nose: Nose normal.      Mouth/Throat:      Mouth: Mucous membranes are moist.   Eyes:      Extraocular Movements: Extraocular movements intact.      Conjunctiva/sclera: Conjunctivae normal.      Pupils: Pupils are equal, round, and reactive to light.   Cardiovascular:      Rate and Rhythm: Normal rate.      Heart sounds: Normal heart sounds.      Comments: Left DP/PT present with Doppler.  Pulmonary:      Effort: Pulmonary effort is normal.      Breath sounds: Normal breath sounds.   Abdominal:      General: There is no distension.      Palpations: Abdomen is soft.      Tenderness: There is no abdominal tenderness.   Musculoskeletal:         General: Normal range of motion.      Cervical back: Normal range of motion.      Comments: Unable to range left hip secondary to pain.  Prior " right foot amputation.   Skin:     General: Skin is warm and dry.      Capillary Refill: Capillary refill takes less than 2 seconds.      Coloration: Skin is not cyanotic.      Comments: Small ulcerated wounds below pannus on the right.   Neurological:      General: No focal deficit present.      Mental Status: He is alert and oriented to person, place, and time. Mental status is at baseline.   Psychiatric:         Attention and Perception: Attention and perception normal.         Mood and Affect: Mood normal.         Behavior: Behavior normal.                    Procedures:  Procedures      Medical Decision Making:      Comorbidities that affect care:    Atrial Fibrillation, Diabetes, Hypertension, Obesity    External Notes reviewed:    Previous Clinic Note: Patient was seen by orthopedic surgery on 8/30/2023 for left hip pain.  Plan was to obtain a MRI of the hip.      The following orders were placed and all results were independently analyzed by me:  Orders Placed This Encounter   Procedures    Comprehensive Metabolic Panel    Magnesium    CBC Auto Differential    Digoxin Level    Basic Metabolic Panel    Urinalysis With Culture If Indicated - Urine, Clean Catch    Diet: Diabetic Diets; Consistent Carbohydrate; Texture: Regular Texture (IDDSI 7); Fluid Consistency: Thin (IDDSI 0)    Vital Signs    Intake & Output    Weigh Patient    Oral Care    Saline Lock & Maintain IV Access    Inpatient Hospitalist Consult    Inpatient Case Management  Consult    PT Consult: Eval & Treat Functional Mobility Below Baseline    POC Glucose 4x Daily Before Meals & at Bedtime    ECG 12 Lead Electrolyte Imbalance    Consult to Wound / Ostomy Care    Insert Peripheral IV    Inpatient Admission    CBC & Differential    CBC & Differential       Medications Given in the Emergency Department:  Medications   morphine injection 4 mg (0 mg Intravenous Hold 9/10/23 8840)   sodium chloride 0.9 % flush 10 mL (10 mL Intravenous  Given 9/11/23 0049)   sodium chloride 0.9 % flush 10 mL (has no administration in time range)   sodium chloride 0.9 % infusion 40 mL (has no administration in time range)   sennosides-docusate (PERICOLACE) 8.6-50 MG per tablet 2 tablet (2 tablets Oral Not Given 9/11/23 0049)     And   polyethylene glycol (MIRALAX) packet 17 g (has no administration in time range)     And   bisacodyl (DULCOLAX) EC tablet 5 mg (has no administration in time range)     And   bisacodyl (DULCOLAX) suppository 10 mg (has no administration in time range)   ondansetron (ZOFRAN) injection 4 mg (has no administration in time range)   famotidine (PEPCID) tablet 40 mg (has no administration in time range)   sodium chloride 0.9 % infusion (100 mL/hr Intravenous New Bag 9/11/23 0047)   acetaminophen (TYLENOL) tablet 650 mg (has no administration in time range)   HYDROcodone-acetaminophen (NORCO) 7.5-325 MG per tablet 1 tablet (1 tablet Oral Given 9/11/23 0307)   dextrose (GLUTOSE) oral gel 15 g (has no administration in time range)   dextrose (D50W) (25 g/50 mL) IV injection 25 g (has no administration in time range)   glucagon (GLUCAGEN) injection 1 mg (has no administration in time range)   Insulin Lispro (humaLOG) injection 2-9 Units (has no administration in time range)        ED Course:    The patient was initially evaluated in the triage area where orders were placed. The patient was later dispositioned by Deedee Manning MD.      The patient was advised to stay for completion of workup which includes but is not limited to communication of labs and radiological results, reassessment and plan. The patient was advised that leaving prior to disposition by a provider could result in critical findings that are not communicated to the patient.     ED Course as of 09/11/23 0646   Mon Sep 11, 2023   0644 ECG 12 Lead Electrolyte Imbalance  Atrial fibrillation with rate of 100.  No acute ST elevation.  Nonspecific T wave abnormalities.  QTc of  491.  EKG interpreted by me. [LD]      ED Course User Index  [LD] Deedee Manning MD       Labs:    Lab Results (last 24 hours)       Procedure Component Value Units Date/Time    CBC & Differential [[id number]]  (Abnormal) Collected: 09/10/23 1802    Specimen: Blood from Arm, Right Updated: 09/10/23 1818    Narrative:      The following orders were created for panel order CBC & Differential.  Procedure                               Abnormality         Status                     ---------                               -----------         ------                     CBC Auto Differential[[id number]]        Abnormal            Final result                 Please view results for these tests on the individual orders.    Comprehensive Metabolic Panel [[id number]]  (Abnormal) Collected: 09/10/23 1802    Specimen: Blood from Arm, Right Updated: 09/10/23 1854     Glucose 170 mg/dL      BUN 8 mg/dL      Creatinine 0.58 mg/dL      Sodium 121 mmol/L      Potassium 4.1 mmol/L      Chloride 87 mmol/L      CO2 22.6 mmol/L      Calcium 9.6 mg/dL      Total Protein 7.0 g/dL      Albumin 3.5 g/dL      ALT (SGPT) 30 U/L      AST (SGOT) 40 U/L      Alkaline Phosphatase 178 U/L      Total Bilirubin 1.2 mg/dL      Globulin 3.5 gm/dL      A/G Ratio 1.0 g/dL      BUN/Creatinine Ratio 13.8     Anion Gap 11.4 mmol/L      eGFR 108.9 mL/min/1.73     Narrative:      GFR Normal >60  Chronic Kidney Disease <60  Kidney Failure <15      Magnesium [[id number]]  (Normal) Collected: 09/10/23 1802    Specimen: Blood from Arm, Right Updated: 09/10/23 1854     Magnesium 1.6 mg/dL     CBC Auto Differential [[id number]]  (Abnormal) Collected: 09/10/23 1802    Specimen: Blood from Arm, Right Updated: 09/10/23 1818     WBC 8.91 10*3/mm3      RBC 5.52 10*6/mm3      Hemoglobin 13.8 g/dL      Hematocrit 44.3 %      MCV 80.3 fL      MCH 25.0 pg      MCHC 31.2 g/dL      RDW 21.2 %      RDW-SD 59.7 fl      MPV 10.6 fL      Platelets 156 10*3/mm3      Neutrophil %  73.9 %      Lymphocyte % 16.2 %      Monocyte % 7.9 %      Eosinophil % 0.9 %      Basophil % 0.8 %      Immature Grans % 0.3 %      Neutrophils, Absolute 6.59 10*3/mm3      Lymphocytes, Absolute 1.44 10*3/mm3      Monocytes, Absolute 0.70 10*3/mm3      Eosinophils, Absolute 0.08 10*3/mm3      Basophils, Absolute 0.07 10*3/mm3      Immature Grans, Absolute 0.03 10*3/mm3      nRBC 0.0 /100 WBC     Digoxin Level [[id number]]  (Abnormal) Collected: 09/10/23 1802    Specimen: Blood from Arm, Right Updated: 09/10/23 2208     Digoxin 0.44 ng/mL     Basic Metabolic Panel [[id number]]  (Abnormal) Collected: 09/11/23 0523    Specimen: Blood from Arm, Right Updated: 09/11/23 0632     Glucose 178 mg/dL      BUN 8 mg/dL      Creatinine 0.45 mg/dL      Sodium 140 mmol/L      Potassium 3.7 mmol/L      Chloride 104 mmol/L      CO2 26.3 mmol/L      Calcium 8.6 mg/dL      BUN/Creatinine Ratio 17.8     Anion Gap 9.7 mmol/L      eGFR 117.6 mL/min/1.73     Narrative:      GFR Normal >60  Chronic Kidney Disease <60  Kidney Failure <15               Imaging:    No Radiology Exams Resulted Within Past 24 Hours      Differential Diagnosis and Discussion:      Weakness: Based on the patient's history, signs, and symptoms, the diffential diagnosis includes but is not limited to meningitis, stroke, sepsis, subarachnoid hemorrhage, intracranial bleeding, encephalitis, acute uti, dehydration, MS, myasthenia gravis, Guillan [place], migraine variant, neuromuscular disorders vertigo, electrolyte imbalance, and metabolic disorders.    All labs were reviewed and interpreted by me.    MDM  Number of Diagnoses or Management Options  Diagnosis management comments: Patient is afebrile nontoxic-appearing.  Vital signs are stable.  At time of evaluation he is lying in bed in no acute distress.  Patient reports chronic left hip pain.  This is unchanged.  He states he is unable to ambulate.  I am unable to even range left hip secondary to pain.  Patient states  he lives alone and is unable to even get inside the house.  Labs showed low sodium of 121.  Glucose also elevated at 170.  Rest of labs showed no significant abnormality.  He denies any other symptoms.  I discussed patient with hospitalist and will admit for further care.       Amount and/or Complexity of Data Reviewed  Clinical lab tests: reviewed  Tests in the radiology section of CPT®: reviewed  Review and summarize past medical records: yes  Independent visualization of images, tracings, or specimens: yes    Risk of Complications, Morbidity, and/or Mortality  Presenting problems: moderate  Management options: moderate             Patient Care Considerations:    CHEST X-RAY: I considered ordering a chest x-ray however no respiratory symptoms      Consultants/Shared Management Plan:    Hospitalist: I have discussed the case with Dr Dodson who agrees to accept the patient for admission.    Social Determinants of Health:    Patient is a nursing home/assisted living resident and has reliable access to care.      Disposition and Care Coordination:    Admit:   Through independent evaluation of the patient's history, physical, and imperical data, the patient meets criteria for observation/admission to the hospital.        Final diagnoses:   Generalized weakness   Hyponatremia        ED Disposition       ED Disposition   Decision to Admit    Condition   --    Comment   Level of Care: Med/Surg [1]   Diagnosis: Generalized weakness [008405]   Attending Physician: SALOME DODSON [[id number]]   Certification: I Certify That Inpatient Hospital Services Are Medically Necessary For Greater Than 2 Midnights                 This medical record created using voice recognition software.             Deedee Manning MD  09/11/23 0646      Electronically signed by Deedee Manning MD at 09/11/23 0646       Physician Progress Notes (last 24 hours)  Notes from 09/10/23 1539 through 09/11/23 1539   No notes of this type exist for  this encounter.       Consult Notes (last 24 hours)  Notes from 09/10/23 1539 through 09/11/23 1539   No notes of this type exist for this encounter.        Neurology [facility] Clinical Indications for Admission to Inpatient Care by Trudi Zazueta RN       Indications Met: Reviewed on 9/11/2023 by Trudi Zazueta RN       Created Using Review Status Review Entered   [facility] Primary Completed 9/11/2023 0906       Created By   Trudi Zazueta RN       Criteria Set Name - Subset   Neurology [facility]G Clinical Indications for Admission to Inpatient Care      Criteria Review   Neurology [facility] Clinical Indications for Admission to Inpatient Care     Overall Determination: Indications Met     Criteria:  [×] Hospital admission is needed for appropriate care of the patient because of  1 or more  of the following  (1):      [×] Neurologic finding requiring inpatient care, as indicated by  1 or more  of the following  (6) (25) (26) (27) (28) (29) (30) (31):          [×] Weakness that is progressive or severe (eg, inpatient care needed due to functional disability, concern for safety) (32) (33) (34) (35)              9/11/2023  9:06 AM                  -- 9/11/2023  9:06 AM by Trudi Zazueta RN --                      was at [facility] for rehab and was just released. He states he was sent home but when he got home was unable to even get out of the car. He called EMS and was brought here     Notes:  -- 9/11/2023  9:06 AM by Trudi Zazueta RN --      Medications Given in the Emergency Department:      Medications      morphine injection 4 mg (0 mg Intravenous Hold 9/10/23 2329)      sodium chloride 0.9 % flush 10 mL (10 mL Intravenous Given 9/11/23 0049)      sodium chloride 0.9 % flush 10 mL (has no administration in time range)      sodium chloride 0.9 % infusion 40 mL (has no administration in time range)      sennosides-docusate (PERICOLACE) 8.6-50 MG per tablet 2 tablet (2 tablets Oral Not Given 9/11/23 0049)       And      polyethylene glycol (MIRALAX) packet 17 g (has no administration in time range)      And      bisacodyl (DULCOLAX) EC tablet 5 mg (has no administration in time range)      And      bisacodyl (DULCOLAX) suppository 10 mg (has no administration in time range)      ondansetron (ZOFRAN) injection 4 mg (has no administration in time range)      famotidine (PEPCID) tablet 40 mg (has no administration in time range)      sodium chloride 0.9 % infusion (100 mL/hr Intravenous New Bag 9/11/23 0047)      acetaminophen (TYLENOL) tablet 650 mg (has no administration in time range)      HYDROcodone-acetaminophen (NORCO) 7.5-325 MG per tablet 1 tablet (1 tablet Oral Given 9/11/23 0307)      dextrose (GLUTOSE) oral gel 15 g (has no administration in time range)      dextrose (D50W) (25 g/50 mL) IV injection 25 g (has no administration in time range)      glucagon (GLUCAGEN) injection 1 mg (has no administration in time range)      Insulin Lispro (humaLOG) injection 2-9 Units (has no administration in time range)                    -- 9/11/2023  9:06 AM by Trudi Zazueta RN --      Hyponatremia      -Patient euvolemic, asymptomatic      -Glucose mildly elevated      -Superimposed hypochloremia      -EKG reviewed      -Urinalysis pending      -Admits to diarrhea      -NS IVF for now      -Further work-up per clinical course      -Follow labs

## 2023-09-11 NOTE — PLAN OF CARE
Goal Outcome Evaluation:  Plan of Care Reviewed With: patient           Outcome Evaluation: BP hypotensive, started NS at 100ml/hr. Medicated once with PRN pain medication. Staff transferred pt to bariatric bed with air mattress. Pt denies any new issues at this time

## 2023-09-11 NOTE — SIGNIFICANT NOTE
Wound Eval / Progress Noted     Angelica     Patient Name: Jose Shaikh  : 1960  MRN: [id number]  Today's Date: 2023                 Admit Date: 9/10/2023    Visit Dx:    ICD-10-CM ICD-9-CM   1. Generalized weakness  R53.1 780.79   2. Hyponatremia  E87.1 276.1         Generalized weakness    Diabetic ulcer of left heel associated with type 2 DM    Paroxysmal atrial fibrillation    Essential hypertension    Foot pain, bilateral    Type 2 diabetes mellitus, with long-term current use of insulin    Class 3 severe obesity due to excess calories with serious comorbidity and body mass index (BMI) of 45.0 to 49.9 in adult    Dependence on wheelchair        Past Medical History:   Diagnosis Date    Absence of toe of right foot     Acute osteomyelitis of left calcaneus  2021    Anxiety and depression     Arthritis     Claustrophobia     Corns and callus     Diabetic ulcer of left heel associated with type 2 DM 2021    Diabetic ulcer of left heel associated with type 2 DM 2021    Diabetic ulcer of right midfoot associated with type 2 DM 2021    Difficulty walking     Essential hypertension 2021    Hammertoe     Hyperlipidemia LDL goal <100 2021    Ingrown toenail     Obesity     Paroxysmal atrial fibrillation 2021    Polyneuropathy     Pressure ulcer, stage 1     Tinea unguium     Type 2 diabetes mellitus with polyneuropathy         Past Surgical History:   Procedure Laterality Date    CYST REMOVAL      center of back; benign    INCISION AND DRAINAGE ABSCESS      back    INCISION AND DRAINAGE LEG Right 12/10/2021    Procedure: INCISION AND DRAINAGE LOWER EXTREMITY;  Surgeon: Ash Leyva DPM;  Location: [facility] OR;  Service: Podiatry;  Laterality: Right;    OTHER SURGICAL HISTORY      Surgical clips left foot    TOE SURGERY Right     Removal of 5th toe    TRANS METATARSAL AMPUTATION Right 2021    Procedure: AMPUTATION TRANS METATARSAL;  Surgeon: Gordon  Ash Aleman DPM;  Location: [facility] OR;  Service: Podiatry;  Laterality: Right;    WRIST SURGERY Left     repair of injury         Physical Assessment:  Wound 06/22/21 1133 Left lateral heel (Active)   Wound Image   09/11/23 0737   Dressing Appearance open to air 09/11/23 1115   Closure None 09/11/23 1115   Base scab;yellow 09/11/23 1115   Periwound intact;dry 09/11/23 1115   Periwound Temperature warm 09/11/23 1115   Periwound Skin Turgor soft 09/11/23 1115   Edges rolled/closed 09/11/23 1115   Drainage Amount none 09/11/23 1115   Care, Wound cleansed with;sterile normal saline 09/11/23 1115   Dressing Care open to air 09/11/23 1115   Periwound Care moisturizer applied 09/11/23 1115       Wound 12/02/21 Right anterior foot Incision (Active)   Dressing Appearance intact;dry 09/11/23 1115   Closure None 09/11/23 1115   Base red;moist;maroon/purple 09/11/23 1115   Periwound intact;dry;redness;edematous 09/11/23 1115   Periwound Temperature warm 09/11/23 1115   Periwound Skin Turgor soft 09/11/23 1115   Edges open 09/11/23 1115   Drainage Characteristics/Odor serosanguineous 09/11/23 1115   Drainage Amount small 09/11/23 1115   Care, Wound cleansed with;sterile normal saline 09/11/23 1115   Dressing Care dressing applied;non-adherent;petroleum-based;gauze;silver impregnated;hydrofiber;silicone;border dressing 09/11/23 1115   Periwound Care absorptive dressing applied 09/11/23 1115       Wound 09/10/23 2016 Right anterior other (see comments) Pressure Injury (Active)   Dressing Appearance open to air 09/10/23 2016   Drainage Characteristics/Odor serosanguineous 09/10/23 2016   Drainage Amount scant 09/10/23 2016       Wound 09/10/23 2345 Left proximal arm Skin Tear (Active)   Wound Image   09/11/23 0733       Wound 09/10/23 2345 Left distal calf Diabetic Ulcer (Active)   Wound Image   09/11/23 0738   Dressing Appearance open to air 09/11/23 1115   Closure None 09/11/23 1115   Base dry;red 09/11/23 1115   Periwound  intact;dry;edematous 09/11/23 1115   Periwound Temperature warm 09/11/23 1115   Periwound Skin Turgor soft 09/11/23 1115   Edges rolled/closed 09/11/23 1115   Drainage Amount none 09/11/23 1115   Care, Wound cleansed with;sterile normal saline 09/11/23 1115   Dressing Care open to air 09/11/23 1115   Periwound Care moisturizer applied 09/11/23 1115       Wound 09/10/23 2345 Right distal calf MASD (Moisture associated skin damage) (Active)   Wound Image   09/11/23 0739   Dressing Appearance intact;dry 09/11/23 1115   Closure None 09/11/23 1115   Base dry;red 09/11/23 1115   Periwound intact;edematous;redness 09/11/23 1115   Periwound Temperature warm 09/11/23 1115   Periwound Skin Turgor soft 09/11/23 1115   Edges rolled/closed 09/11/23 1115   Drainage Amount none 09/11/23 1115   Care, Wound cleansed with;sterile normal saline 09/11/23 1115   Dressing Care open to air 09/11/23 1115       Wound 09/10/23 2345 Right anterior hip MASD (Moisture associated skin damage) (Active)   Wound Image   09/11/23 0733   Dressing Appearance open to air 09/11/23 1115   Closure None 09/11/23 1115   Base bleeding;moist;red 09/11/23 1115   Red (%), Wound Tissue Color 100 09/11/23 1115   Periwound intact;dry;pink 09/11/23 1115   Periwound Temperature warm 09/11/23 1115   Periwound Skin Turgor soft 09/11/23 1115   Edges open 09/11/23 1115   Drainage Characteristics/Odor serosanguineous 09/11/23 1115   Drainage Amount scant 09/11/23 1115   Care, Wound cleansed with;sterile normal saline 09/11/23 1115   Dressing Care open to air;skin barrier agent applied 09/11/23 1115   Periwound Care topical treatment applied 09/11/23 1115       Wound 09/10/23 2345 Left anterior hip MASD (Moisture associated skin damage) (Active)   Wound Image   09/11/23 0735   Dressing Appearance open to air 09/11/23 1115   Closure None 09/11/23 1115   Base red;moist 09/11/23 1115   Periwound intact;dry;pink 09/11/23 1115   Periwound Temperature warm 09/11/23 1115    Periwound Skin Turgor soft 09/11/23 1115   Edges open 09/11/23 1115   Drainage Characteristics/Odor serosanguineous 09/11/23 1115   Drainage Amount scant 09/11/23 1115   Care, Wound cleansed with;sterile normal saline 09/11/23 1115   Dressing Care open to air;skin barrier agent applied 09/11/23 1115   Periwound Care topical treatment applied 09/11/23 1115      Wound Check / Follow-up:  Patient seen today for a wound consult. Patient with a chronic non-healing surgical wound to the right anterior foot. Majority of the surgical incision is closed with a small open are to the middle of the incision. Wound base is red and moist with maroon purple periwound. Cleansed with NS. Recommend daily dressing changes with silver impregnated hydrofiber.  Bilateral lower lower legs and feet with dry flaking / peeling skin; areas of the bilateral lower legs with red superficial tissue loss. Ulceration to the left plantar foot with thickened callused present; unable to visualize wound base due to presence of callused tissue. Recommend to provide quality skin care and application ammonium lactate BID.   Bilateral groin and abdominal folds with red moist tissue intermittent areas with superficial tissue loss. Recommend to cleanse with soap and water. Apply a light dusting of the cornstarch powder and then apply dry fit sheet or moisture wicking fabric.  Patient denied this nurse to assess buttocks at this time. Encouraged patient to turn and offload every two hours.     Impression: ulceration to the right surgical incision, dry / flaking skin to bilateral legs, MASD    Short term goals:  regain skin integrity, skin protection, moisture management, skin protection, daily dressing changes, pressure reduction, topical treatment    Karon Bolton RN    9/11/2023    14:02 EDT

## 2023-09-11 NOTE — PROGRESS NOTES
[facility]   Hospitalist Progress Note  Date: 2023  Patient Name: Jose Shaikh  : 1960  MRN: [id number]  Date of admission: 9/10/2023  Room/Bed: [facility]/      Subjective   Subjective     Chief Complaint: Weakness    Summary:Jose Shaikh is a 62 y.o. male with multiple medical problems [facility].  He stated that he could barely move or get out of his car so EMS was called.  He said that due to nonweightbearing restrictions he was unable to work on his lower extremity weakness at rehab and was unable to care for himself upon discharge.    Interval Followup: leg cramps earlier.  Otherwise no new complaints.    Review of Systems    All systems reviewed and negative except for what is outlined above.      Objective   Objective     Vitals:   Temp:  [97.6 °F (36.4 °C)-98.2 °F (36.8 °C)] 98.2 °F (36.8 °C)  Heart Rate:  [] 83  Resp:  [18] 18  BP: ()/() 97/44    Physical Exam   General: Awake, alert, NAD  HENT: NCAT, MMM  Eyes: pupils equal, no scleral icterus  Cardiovascular: RRR, no murmurs   Pulmonary: CTA bilaterally; no wheezes; no conversational dyspnea  Gastrointestinal: S/ND/NT, +BS  Musculoskeletal: s/p partial amputation right foot  Skin: No jaundice, no rash on exposed skin appreciated  Neuro: CN II through XII grossly intact; speech clear; no tremor  Psych: Mood and affect appropriate      Result Review    Result Review:  I have personally reviewed these results:  [x]  Laboratory      Lab 09/10/23  1802   WBC 8.91   HEMOGLOBIN 13.8   HEMATOCRIT 44.3   PLATELETS 156   NEUTROS ABS 6.59   IMMATURE GRANS (ABS) 0.03   LYMPHS ABS 1.44   MONOS ABS 0.70   EOS ABS 0.08   MCV 80.3         Lab 23  0523 09/10/23  1802   SODIUM 140 121*   POTASSIUM 3.7 4.1   CHLORIDE 104 87*   CO2 26.3 22.6   ANION GAP 9.7 11.4   BUN 8 8   CREATININE 0.45* 0.58*   EGFR 117.6 108.9   GLUCOSE 178* 170*   CALCIUM 8.6 9.6   MAGNESIUM  --  1.6          Lab 09/10/23  1802   TOTAL PROTEIN 7.0   ALBUMIN 3.5   GLOBULIN 3.5   ALT (SGPT) 30   AST (SGOT) 40   BILIRUBIN 1.2   ALK PHOS 178*                     Brief Urine Lab Results  (Last result in the past 365 days)        Color   Clarity   Blood   Leuk Est   Nitrite   Protein   CREAT   Urine HCG        05/12/23 1454 Orange   Cloudy   Negative   Small (1+)   Positive   --  Comment: Result not available due to interfering substances.                 [x]  Microbiology   Microbiology Results (last 10 days)       ** No results found for the last 240 hours. **          [x]  Radiology  No radiology results for the last 7 days  []  EKG/Telemetry   []  Cardiology/Vascular   []  Pathology  []  Old records  []  Other:    Assessment & Plan   Assessment / Plan     Assessment:  Generalized weakness  Diabetes  Low sodium likely a lab error given drastic change this morning  Hypertension  History of atrial fibrillation  Morbid obesity  Debility with wheelchair dependent  Chronic wound    Plan:  Patient placed back in the hospital  PT/OT  Can resume chronic medications  Will need placed back in rehab or long-term care due to inability to care for himself     Discussed with RN.    DVT prophylaxis:  No DVT prophylaxis order currently exists.    CODE STATUS:        Electronically signed by Jose Maya MD, 09/11/23, 6:47 PM EDT.

## 2023-09-11 NOTE — H&P
[facility]   HISTORY AND PHYSICAL    Patient Name: Jose Shaikh  : 1960  MRN: [id number]  Primary Care Physician:  Delia Cervantes APRN  Date of admission: 9/10/2023    Subjective   Subjective     Chief Complaint: Generalized weakness    HPI:    Jose Shaikh is a 62 y.o. male with past medical history of diabetes with polyneuropathy and foot amputation, hypertension, chronic wounds, morbid obesity, anxiety/depression, and GERD presented via EMS with generalized weakness.  Patient was discharged from [facility] yesterday and stated that he could barely move or get out of his car so EMS was called to bring him to the ED.  Patient states that due to nonweightbearing restrictions that was implemented by wound care during his rehab, physical therapy did not work on strengthening his left lower extremity.  Due to the restriction patient feels as though his rehab was not adequate resulted in him being unable to care for himself upon discharge.  In the ED patient's vitals were all within normal limits on arrival.  Labs showed that he did have significant hyponatremia with remaining labs being relatively unremarkable given his chronic conditions.  When asked he denied any recent fevers, chills, headaches, chest pain, palpitation, shortness of breath, cough, abdominal pain, nausea, vomiting, diarrhea, constipation, dysuria, hematuria, hematochezia, melena, or anxiety.  Patient was admitted for further evaluation and treatment.    Review of Systems   All systems were reviewed and negative except for: As specified in HPI    Personal History     Past Medical History:   Diagnosis Date   • Absence of toe of right foot    • Acute osteomyelitis of left calcaneus  2021   • Anxiety and depression    • Arthritis    • Claustrophobia    • Corns and callus    • Diabetic ulcer of left heel associated with type 2 DM 2021   • Diabetic ulcer of left heel associated with type 2 DM 2021   • Diabetic ulcer of  right midfoot associated with type 2 DM 8/18/2021   • Difficulty walking    • Essential hypertension 8/31/2021   • Hammertoe    • Hyperlipidemia LDL goal <100 8/31/2021   • Ingrown toenail    • Obesity    • Paroxysmal atrial fibrillation 8/31/2021   • Polyneuropathy    • Pressure ulcer, stage 1    • Tinea unguium    • Type 2 diabetes mellitus with polyneuropathy        Past Surgical History:   Procedure Laterality Date   • CYST REMOVAL      center of back; benign   • INCISION AND DRAINAGE ABSCESS      back   • INCISION AND DRAINAGE LEG Right 12/10/2021    Procedure: INCISION AND DRAINAGE LOWER EXTREMITY;  Surgeon: Ash Leyva DPM;  Location: [facility] MAIN OR;  Service: Podiatry;  Laterality: Right;   • OTHER SURGICAL HISTORY      Surgical clips left foot   • TOE SURGERY Right     Removal of 5th toe   • TRANS METATARSAL AMPUTATION Right 12/2/2021    Procedure: AMPUTATION TRANS METATARSAL;  Surgeon: Ash Leyva DPM;  Location: [facility] OR;  Service: Podiatry;  Laterality: Right;   • WRIST SURGERY Left     repair of injury       Family History: family history includes Cancer in his father; Heart disease in his brother, father, and mother. Otherwise pertinent FHx was reviewed and not pertinent to current issue.    Social History:  reports that he quit smoking about 32 years ago. His smoking use included cigarettes. He has never used smokeless tobacco. He reports current alcohol use. He reports current drug use. Drug: Marijuana.    Home Medications:  Dulaglutide, HYDROcodone-acetaminophen, Insulin Lispro (1 Unit Dial), acetaminophen, apixaban, digoxin, insulin detemir, metFORMIN, metoprolol succinate XL, naloxone, pregabalin, and simvastatin      Allergies:  Allergies   Allergen Reactions   • Adhesive Tape Rash       Objective   Objective     Vitals:   Temp:  [97.6 °F (36.4 °C)-98 °F (36.7 °C)] 98 °F (36.7 °C)  Heart Rate:  [] 83  Resp:  [18] 18  BP: ()/() 97/44  Physical  Exam    Constitutional: Awake, alert   Eyes: PERRLA, sclerae anicteric, no conjunctival injection   HENT: NCAT, mucous membranes moist   Neck: Supple, no thyromegaly, no lymphadenopathy, trachea midline   Respiratory: Clear to auscultation bilaterally, nonlabored respirations    Cardiovascular: RRR, no murmurs, rubs, or gallops, palpable pedal pulses bilaterally   Gastrointestinal: Positive bowel sounds, soft, nontender, nondistended   Musculoskeletal: Transmetatarsal amputation, no clubbing or cyanosis to extremities   Psychiatric: Appropriate affect, cooperative   Neurologic: Oriented x 3, strength symmetric in all extremities, Cranial Nerves grossly intact to confrontation, speech clear   Skin: Healing lower extremity wounds    Result Review    Result Review:  I have personally reviewed the results from the time of this admission to 9/11/2023 02:37 EDT and agree with these findings:  [x]  Laboratory list / accordion  []  Microbiology  []  Radiology  [x]  EKG/Telemetry   []  Cardiology/Vascular   []  Pathology  []  Old records  []  Other:  Most notable findings include: Hyponatremia      Assessment & Plan   Assessment / Plan     Brief Patient Summary:  Jose Shaikh is a 62 y.o. male who ith past medical history of diabetes with polyneuropathy and foot amputation, hypertension, chronic wounds, morbid obesity, anxiety/depression, and GERD presented via EMS with generalized weakness    Active Hospital Problems:  Active Hospital Problems    Diagnosis    • **Generalized weakness    • Type 2 diabetes mellitus, with long-term current use of insulin    • Class 3 severe obesity due to excess calories with serious comorbidity and body mass index (BMI) of 45.0 to 49.9 in adult    • Dependence on wheelchair    • Foot pain, bilateral    • Paroxysmal atrial fibrillation    • Essential hypertension    • Diabetic ulcer of left heel associated with type 2 DM      Plan:     Generalized weakness  -Admit to medical  floor  -Discharged from inpatient rehab yesterday  -Patient unable to perform ADLs.   -Patient morbidly obese  -Fall precautions  -PT OT  -Wound care  -Case management consulted for likely placement  -Supportive care    Hyponatremia  -Patient euvolemic, asymptomatic  -Glucose mildly elevated  -Superimposed hypochloremia  -EKG reviewed  -Urinalysis pending  -Admits to diarrhea  -NS IVF for now  -Further work-up per clinical course  -Follow labs    DM2  -ISS  -Titrate as needed    HTN  -Currently well controlled  -PRN BP meds  -Resume home meds when available  -Titrate if needed    Hx A-fib  -Resume home AC when available    Hx CKD  Hx CAD  Hx Morbid Obesity  Hx Chronic Wound: Wound care consulted  Debility/Wheelchair dependence     GI ppx  DVT ppx      DVT prophylaxis:  No DVT prophylaxis order currently exists.    CODE STATUS:       Admission Status:  I believe this patient meets inpatient status.      Electronically signed by Levi Finney MD, 09/11/23, 2:37 AM EDT.

## 2023-09-12 ENCOUNTER — TELEPHONE (OUTPATIENT)
Dept: INTERNAL MEDICINE | Facility: CLINIC | Age: 63
End: 2023-09-12

## 2023-09-12 LAB
ALBUMIN SERPL-MCNC: 2.9 G/DL (ref 3.5–5.2)
ANION GAP SERPL CALCULATED.3IONS-SCNC: 9.7 MMOL/L (ref 5–15)
BASOPHILS # BLD AUTO: 0.02 10*3/MM3 (ref 0–0.2)
BASOPHILS NFR BLD AUTO: 0.6 % (ref 0–1.5)
BUN SERPL-MCNC: 8 MG/DL (ref 8–23)
BUN/CREAT SERPL: 15.4 (ref 7–25)
CALCIUM SPEC-SCNC: 8.6 MG/DL (ref 8.6–10.5)
CHLORIDE SERPL-SCNC: 104 MMOL/L (ref 98–107)
CO2 SERPL-SCNC: 24.3 MMOL/L (ref 22–29)
CREAT SERPL-MCNC: 0.52 MG/DL (ref 0.76–1.27)
DEPRECATED RDW RBC AUTO: 60.4 FL (ref 37–54)
EGFRCR SERPLBLD CKD-EPI 2021: 112.6 ML/MIN/1.73
EOSINOPHIL # BLD AUTO: 0.11 10*3/MM3 (ref 0–0.4)
EOSINOPHIL NFR BLD AUTO: 3.2 % (ref 0.3–6.2)
ERYTHROCYTE [DISTWIDTH] IN BLOOD BY AUTOMATED COUNT: 21 % (ref 12.3–15.4)
GLUCOSE BLDC GLUCOMTR-MCNC: 190 MG/DL (ref 70–99)
GLUCOSE BLDC GLUCOMTR-MCNC: 200 MG/DL (ref 70–99)
GLUCOSE BLDC GLUCOMTR-MCNC: 207 MG/DL (ref 70–99)
GLUCOSE SERPL-MCNC: 215 MG/DL (ref 65–99)
HCT VFR BLD AUTO: 36.8 % (ref 37.5–51)
HGB BLD-MCNC: 11.9 G/DL (ref 13–17.7)
IMM GRANULOCYTES # BLD AUTO: 0.01 10*3/MM3 (ref 0–0.05)
IMM GRANULOCYTES NFR BLD AUTO: 0.3 % (ref 0–0.5)
LYMPHOCYTES # BLD AUTO: 1 10*3/MM3 (ref 0.7–3.1)
LYMPHOCYTES NFR BLD AUTO: 29.1 % (ref 19.6–45.3)
MCH RBC QN AUTO: 25.8 PG (ref 26.6–33)
MCHC RBC AUTO-ENTMCNC: 32.3 G/DL (ref 31.5–35.7)
MCV RBC AUTO: 79.7 FL (ref 79–97)
MONOCYTES # BLD AUTO: 0.46 10*3/MM3 (ref 0.1–0.9)
MONOCYTES NFR BLD AUTO: 13.4 % (ref 5–12)
NEUTROPHILS NFR BLD AUTO: 1.84 10*3/MM3 (ref 1.7–7)
NEUTROPHILS NFR BLD AUTO: 53.4 % (ref 42.7–76)
NRBC BLD AUTO-RTO: 0 /100 WBC (ref 0–0.2)
PHOSPHATE SERPL-MCNC: 3.9 MG/DL (ref 2.5–4.5)
PLATELET # BLD AUTO: 106 10*3/MM3 (ref 140–450)
PMV BLD AUTO: 10.7 FL (ref 6–12)
POTASSIUM SERPL-SCNC: 3.7 MMOL/L (ref 3.5–5.2)
RBC # BLD AUTO: 4.62 10*6/MM3 (ref 4.14–5.8)
SODIUM SERPL-SCNC: 138 MMOL/L (ref 136–145)
WBC NRBC COR # BLD: 3.44 10*3/MM3 (ref 3.4–10.8)

## 2023-09-12 PROCEDURE — 85025 COMPLETE CBC W/AUTO DIFF WBC: CPT | Performed by: FAMILY MEDICINE

## 2023-09-12 PROCEDURE — 63710000001 INSULIN DETEMIR PER 5 UNITS: Performed by: INTERNAL MEDICINE

## 2023-09-12 PROCEDURE — 82948 REAGENT STRIP/BLOOD GLUCOSE: CPT

## 2023-09-12 PROCEDURE — 80069 RENAL FUNCTION PANEL: CPT | Performed by: INTERNAL MEDICINE

## 2023-09-12 PROCEDURE — 97161 PT EVAL LOW COMPLEX 20 MIN: CPT

## 2023-09-12 PROCEDURE — 63710000001 INSULIN LISPRO (HUMAN) PER 5 UNITS: Performed by: FAMILY MEDICINE

## 2023-09-12 PROCEDURE — 99232 SBSQ HOSP IP/OBS MODERATE 35: CPT | Performed by: INTERNAL MEDICINE

## 2023-09-12 RX ADMIN — APIXABAN 5 MG: 5 TABLET, FILM COATED ORAL at 20:43

## 2023-09-12 RX ADMIN — PREGABALIN 25 MG: 25 CAPSULE ORAL at 20:43

## 2023-09-12 RX ADMIN — BACLOFEN 10 MG: 10 TABLET ORAL at 05:27

## 2023-09-12 RX ADMIN — PREGABALIN 25 MG: 25 CAPSULE ORAL at 09:24

## 2023-09-12 RX ADMIN — INSULIN DETEMIR 20 UNITS: 100 INJECTION, SOLUTION SUBCUTANEOUS at 20:43

## 2023-09-12 RX ADMIN — HYDROCODONE BITARTRATE AND ACETAMINOPHEN 1 TABLET: 7.5; 325 TABLET ORAL at 09:24

## 2023-09-12 RX ADMIN — INSULIN LISPRO 2 UNITS: 100 INJECTION, SOLUTION INTRAVENOUS; SUBCUTANEOUS at 17:33

## 2023-09-12 RX ADMIN — INSULIN LISPRO 2 UNITS: 100 INJECTION, SOLUTION INTRAVENOUS; SUBCUTANEOUS at 13:56

## 2023-09-12 RX ADMIN — HYDROCODONE BITARTRATE AND ACETAMINOPHEN 1 TABLET: 7.5; 325 TABLET ORAL at 03:18

## 2023-09-12 RX ADMIN — BACLOFEN 10 MG: 10 TABLET ORAL at 17:33

## 2023-09-12 RX ADMIN — HYDROCODONE BITARTRATE AND ACETAMINOPHEN 1 TABLET: 7.5; 325 TABLET ORAL at 13:56

## 2023-09-12 RX ADMIN — Medication 10 ML: at 09:24

## 2023-09-12 RX ADMIN — PREGABALIN 25 MG: 25 CAPSULE ORAL at 16:19

## 2023-09-12 RX ADMIN — INSULIN DETEMIR 15 UNITS: 100 INJECTION, SOLUTION SUBCUTANEOUS at 09:24

## 2023-09-12 RX ADMIN — APIXABAN 5 MG: 5 TABLET, FILM COATED ORAL at 09:24

## 2023-09-12 RX ADMIN — ACETAMINOPHEN 650 MG: 325 TABLET ORAL at 23:46

## 2023-09-12 RX ADMIN — FAMOTIDINE 40 MG: 20 TABLET, FILM COATED ORAL at 09:23

## 2023-09-12 RX ADMIN — Medication: at 17:34

## 2023-09-12 RX ADMIN — Medication 10 ML: at 20:44

## 2023-09-12 RX ADMIN — INSULIN LISPRO 4 UNITS: 100 INJECTION, SOLUTION INTRAVENOUS; SUBCUTANEOUS at 09:24

## 2023-09-12 RX ADMIN — ATORVASTATIN CALCIUM 10 MG: 10 TABLET, FILM COATED ORAL at 09:24

## 2023-09-12 RX ADMIN — INSULIN LISPRO 4 UNITS: 100 INJECTION, SOLUTION INTRAVENOUS; SUBCUTANEOUS at 20:43

## 2023-09-12 RX ADMIN — HYDROCODONE BITARTRATE AND ACETAMINOPHEN 1 TABLET: 7.5; 325 TABLET ORAL at 20:43

## 2023-09-12 NOTE — PLAN OF CARE
Goal Outcome Evaluation:  Plan of Care Reviewed With: (P) patient           Outcome Evaluation: (P) Patient presents with severe bilateral LE strength deficits and endurance impairments that have affected his functional mobility status below his baseline. He reports that he has been NWB for weeks but was able to get around independently with an assistive device prior to that. Skilled physical therapy is required to address his deficits.      Anticipated Discharge Disposition (PT): (P) inpatient rehabilitation facility

## 2023-09-12 NOTE — PLAN OF CARE
Goal Outcome Evaluation:           Progress: no change  Outcome Evaluation: Patient refused night time Levemir, Humalog, and Lyrica. Education provided. BP soft, but stable for patient. Medicated with PRN Encinal. UA colected and sent to lab. Will continue to monitor.

## 2023-09-12 NOTE — PROGRESS NOTES
[facility]   Hospitalist Progress Note  Date: 2023  Patient Name: Jose Shaikh  : 1960  MRN: [id number]  Date of admission: 9/10/2023  Room/Bed: [facility]/      Subjective   Subjective     Chief Complaint: Weakness    Summary:Jose Shaikh is a 62 y.o. male with multiple medical problems [facility].  He stated that he could barely move or get out of his car so EMS was called.  He said that due to nonweightbearing restrictions he was unable to work on his lower extremity weakness at rehab and was unable to care for himself upon discharge.    Interval Followup: Has occ leg cramps, no new complaints.  Declined insulin last night reportedly because he wanted a higher dose?    Review of Systems    All systems reviewed and negative except for what is outlined above.      Objective   Objective     Vitals:   Temp:  [97.5 °F (36.4 °C)-98.2 °F (36.8 °C)] 97.6 °F (36.4 °C)  Heart Rate:  [51-87] 53  Resp:  [18] 18  BP: ()/(45-91) 112/45    Physical Exam   General: Awake, alert, NAD  HENT: NCAT, MMM  Eyes: pupils equal, no scleral icterus  Cardiovascular: RRR, no murmurs   Pulmonary: CTA bilaterally; no wheezes; no conversational dyspnea  Gastrointestinal: S/ND/NT, +BS  Musculoskeletal: s/p partial amputation right foot  Skin: No jaundice, no rash on exposed skin appreciated  Neuro: CN II through XII grossly intact; speech clear; no tremor  Psych: Mood and affect appropriate      Result Review    Result Review:  I have personally reviewed these results:  [x]  Laboratory      Lab 23  0623 09/10/23  1802   WBC 3.44 8.91   HEMOGLOBIN 11.9* 13.8   HEMATOCRIT 36.8* 44.3   PLATELETS 106* 156   NEUTROS ABS 1.84 6.59   IMMATURE GRANS (ABS) 0.01 0.03   LYMPHS ABS 1.00 1.44   MONOS ABS 0.46 0.70   EOS ABS 0.11 0.08   MCV 79.7 80.3           Lab 23  0623 23  0523 09/10/23  1802   SODIUM 138 140 121*   POTASSIUM 3.7 3.7 4.1   CHLORIDE 104 104 87*    CO2 24.3 26.3 22.6   ANION GAP 9.7 9.7 11.4   BUN 8 8 8   CREATININE 0.52* 0.45* 0.58*   EGFR 112.6 117.6 108.9   GLUCOSE 215* 178* 170*   CALCIUM 8.6 8.6 9.6   MAGNESIUM  --   --  1.6   PHOSPHORUS 3.9  --   --            Lab 09/12/23  0623 09/10/23  1802   TOTAL PROTEIN  --  7.0   ALBUMIN 2.9* 3.5   GLOBULIN  --  3.5   ALT (SGPT)  --  30   AST (SGOT)  --  40   BILIRUBIN  --  1.2   ALK PHOS  --  178*                       Brief Urine Lab Results  (Last result in the past 365 days)        Color   Clarity   Blood   Leuk Est   Nitrite   Protein   CREAT   Urine HCG        09/11/23 2200 Dark Yellow   Clear   Negative   Negative   Negative   Negative                 [x]  Microbiology   Microbiology Results (last 10 days)       ** No results found for the last 240 hours. **          [x]  Radiology  No radiology results for the last 7 days  []  EKG/Telemetry   []  Cardiology/Vascular   []  Pathology  []  Old records  []  Other:    Assessment & Plan   Assessment / Plan     Assessment:  Generalized weakness  Diabetes  Low sodium likely a lab error given drastic change this morning  Hypertension  History of atrial fibrillation  Morbid obesity  Debility with wheelchair dependent  Chronic wound    Plan:  Patient placed back in the hospital  PT/OT  Can resume chronic medications  Increase insulin  Will need placed back in rehab or long-term care due to inability to care for himself     Discussed with RN.    DVT prophylaxis:  Medical DVT prophylaxis orders are present.    CODE STATUS:        Electronically signed by Jose Maya MD, 09/12/23, 1:47 PM EDT.

## 2023-09-12 NOTE — THERAPY EVALUATION
Acute Care - Physical Therapy Initial Evaluation   Angelica     Patient Name: Jose Shaikh  : 1960  MRN: [id number]  Today's Date: 2023   Admit date: 9/10/2023     Referring Physician: Jose Maya MD     Surgery Date:* No surgery found *           Visit Dx:     ICD-10-CM ICD-9-CM   1. Generalized weakness  R53.1 780.79   2. Hyponatremia  E87.1 276.1   3. Difficulty in walking  R26.2 719.7     Patient Active Problem List   Diagnosis    Diabetic ulcer of left heel associated with type 2 DM    Acute osteomyelitis of left calcaneus     Diabetic ulcer of left heel associated with type 2 DM    Diabetic ulcer of right midfoot associated with type 2 DM    Paroxysmal atrial fibrillation    Essential hypertension    Hyperlipidemia LDL goal <100    Cellulitis and abscess of foot    High alkaline phosphatase level    Osteomyelitis    Onychomycosis    Onychocryptosis    Foot pain, bilateral    Osteomyelitis of foot, right, acute    Cellulitis of right foot    Type 2 diabetes mellitus, with long-term current use of insulin    Class 3 severe obesity due to excess calories with serious comorbidity and body mass index (BMI) of 45.0 to 49.9 in adult    Anxiety disorder, unspecified    Claustrophobia    Dependence on wheelchair    Depression, unspecified    Long term (current) use of anticoagulants    Long term (current) use of oral hypoglycemic drugs    Wound of foot    Non-prs chronic ulcer oth prt r foot limited to brkdwn skin    Orthostatic hypotension    Other chronic osteomyelitis, right ankle and foot    Personal history of nicotine dependence    Thrombocytopenia, unspecified    Unspecified open wound, right foot, initial encounter    Diabetic foot infection    Subacute osteomyelitis of right foot    Right foot pain    Sepsis    Onychomycosis    Foot pain, left    Impaired mobility and ADLs    Absence of toe of right foot    Corns and callosity    Disability of walking    Fracture    Limb swelling     Polyneuropathy    Pressure ulcer, stage 1    Shortness of breath    Generalized weakness     Past Medical History:   Diagnosis Date    Absence of toe of right foot     Acute osteomyelitis of left calcaneus  8/18/2021    Anxiety and depression     Arthritis     Claustrophobia     Corns and callus     Diabetic ulcer of left heel associated with type 2 DM 8/18/2021    Diabetic ulcer of left heel associated with type 2 DM 7/6/2021    Diabetic ulcer of right midfoot associated with type 2 DM 8/18/2021    Difficulty walking     Essential hypertension 8/31/2021    Hammertoe     Hyperlipidemia LDL goal <100 8/31/2021    Ingrown toenail     Obesity     Paroxysmal atrial fibrillation 8/31/2021    Polyneuropathy     Pressure ulcer, stage 1     Tinea unguium     Type 2 diabetes mellitus with polyneuropathy      Past Surgical History:   Procedure Laterality Date    CYST REMOVAL      center of back; benign    INCISION AND DRAINAGE ABSCESS      back    INCISION AND DRAINAGE LEG Right 12/10/2021    Procedure: INCISION AND DRAINAGE LOWER EXTREMITY;  Surgeon: Ash Leyva DPM;  Location: [facility] OR;  Service: Podiatry;  Laterality: Right;    OTHER SURGICAL HISTORY      Surgical clips left foot    TOE SURGERY Right     Removal of 5th toe    TRANS METATARSAL AMPUTATION Right 12/2/2021    Procedure: AMPUTATION TRANS METATARSAL;  Surgeon: Ash Leyva DPM;  Location: [facility] MAIN OR;  Service: Podiatry;  Laterality: Right;    WRIST SURGERY Left     repair of injury     PT Assessment (last 12 hours)       PT Evaluation and Treatment       Row Name 09/12/23 1252          Physical Therapy Time and Intention    Subjective Information complains of;weakness;fatigue;pain (P)   -RH     Document Type evaluation (P)   -RH     Mode of Treatment individual therapy;group therapy (P)   -RH     Patient Effort poor (P)   -RH     Symptoms Noted During/After Treatment increased pain (P)   -RH       Row Name 09/12/23 2509           General Information    Patient Profile Reviewed yes (P)   -     Patient Observations alert;poorly cooperative (P)   -     Prior Level of Function max assist:;all household mobility;community mobility;ADL's (P)   -     Equipment Currently Used at Home wheelchair;walker, rolling (P)   -     Existing Precautions/Restrictions fall (P)   -       Row Name 09/12/23 1259          Living Environment    Current Living Arrangements other (see comments) (P)   [facility]  -     People in Home alone (P)   -     Primary Care Provided by self;other (see comments) (P)   Staff at [facility]  -[facility] Name 09/12/23 1259          Home Use of Assistive/Adaptive Equipment    Equipment Currently Used at Home wheelchair;walker, rolling (P)   -       Row Name 09/12/23 1259          Range of Motion (ROM)    Range of Motion bilateral lower extremities (P)   AROM limited bilaterally due to weakness  -[facility] Name 09/12/23 1259          Strength (Manual Muscle Testing)    Strength (Manual Muscle Testing) bilateral lower extremities (P)   LLE: 2/5   RLE: 3/5; all tested in a supine position  -[facility] Name 09/12/23 1259          Mobility    Extremity Weight-bearing Status right lower extremity (P)   -     Right Lower Extremity (Weight-bearing Status) non weight-bearing (NWB);other (see comments) (P)   Patient reports he has been told he has NWB status due to infection.  -       Row Name 09/12/23 1259          Bed Mobility    Bed Mobility other (see comments) (P)   Patient declined all bed mobility due to pain.  -     Bed Mobility, Safety Issues decreased use of arms for pushing/pulling;decreased use of legs for bridging/pushing;unable to safely maintain weight bearing restrictions (P)   -       Row Name 09/12/23 1259          Transfers    Transfers other (see comments) (P)   Patient declined all transfers due to pain.  -[facility] Name 09/12/23 1259          Gait/Stairs (Locomotion)    Patient  was able to Ambulate no, other medical factors prevent ambulation (P)   -RH     Reason Patient was unable to Ambulate Uncontrolled Pain (P)   -RH       Row Name 09/12/23 1259          Safety Issues, Functional Mobility    Impairments Affecting Function (Mobility) endurance/activity tolerance;strength (P)   -RH       Row Name 09/12/23 1259          Balance    Balance Assessment sitting static balance (P)   -RH     Static Sitting Balance standby assist (P)   -RH     Position, Sitting Balance long sitting;other (see comments) (P)   in bed  -RH       Row Name             Wound 09/10/23 2016 Right anterior other (see comments) Pressure Injury    Wound - Properties Group Placement Date: 09/10/23  -KE Placement Time: 2016  -KE Present on Hospital Admission: Y  -KE Side: Right  -KE Orientation: anterior  -KE Location: other (see comments)  -KE Primary Wound Type: Pressure inj  -KE, to tip of amputated stump.     Retired Wound - Properties Group Placement Date: 09/10/23  -KE Placement Time: 2016  -KE Present on Hospital Admission: Y  -KE Side: Right  -KE Orientation: anterior  -KE Location: other (see comments)  -KE Primary Wound Type: Pressure inj  -KE, to tip of amputated stump.     Retired Wound - Properties Group Date first assessed: 09/10/23  -KE Time first assessed: 2016  -KE Present on Hospital Admission: Y  -KE Side: Right  -KE Location: other (see comments)  -KE Primary Wound Type: Pressure inj  -KE, to tip of amputated stump.       Row Name             Wound 09/10/23 2345 Left proximal arm Skin Tear    Wound - Properties Group Placement Date: 09/10/23  -BL Placement Time: 2345 -BL Present on Hospital Admission: Y  -BL Side: Left  -BL Orientation: proximal  -BL Location: arm  -BL Primary Wound Type: Skin tear  -BL, old healing skin tear     Retired Wound - Properties Group Placement Date: 09/10/23  -BL Placement Time: 2345 -BL Present on Hospital Admission: Y  -BL Side: Left  -BL Orientation: proximal  -BL  Location: arm  -BL Primary Wound Type: Skin tear  -BL, old healing skin tear     Retired Wound - Properties Group Date first assessed: 09/10/23  -BL Time first assessed: 2345  -BL Present on Hospital Admission: Y  -BL Side: Left  -BL Location: arm  -BL Primary Wound Type: Skin tear  -BL, old healing skin tear       Row Name             Wound 09/10/23 2345 Left posterior foot Diabetic Ulcer    Wound - Properties Group Placement Date: 09/10/23  -BL Placement Time: 2345  -BL Side: Left  -BL Orientation: posterior  -BL Location: foot  -BL Primary Wound Type: Diabetic ulc  -BL    Retired Wound - Properties Group Placement Date: 09/10/23  -BL Placement Time: 2345  -BL Side: Left  -BL Orientation: posterior  -BL Location: foot  -BL Primary Wound Type: Diabetic ulc  -BL    Retired Wound - Properties Group Date first assessed: 09/10/23  -BL Time first assessed: 2345  -BL Side: Left  -BL Location: foot  -BL Primary Wound Type: Diabetic ulc  -BL      Row Name             Wound 09/10/23 2345 Left distal calf Diabetic Ulcer    Wound - Properties Group Placement Date: 09/10/23  -BL Placement Time: 2345  -BL Side: Left  -BL Orientation: distal  -BL Location: calf  -BL Primary Wound Type: Diabetic ulc  -BL    Retired Wound - Properties Group Placement Date: 09/10/23  -BL Placement Time: 2345  -BL Side: Left  -BL Orientation: distal  -BL Location: calf  -BL Primary Wound Type: Diabetic ulc  -BL    Retired Wound - Properties Group Date first assessed: 09/10/23  -BL Time first assessed: 2345  -BL Side: Left  -BL Location: calf  -BL Primary Wound Type: Diabetic ulc  -BL      Row Name             Wound 09/10/23 2345 Right distal calf MASD (Moisture associated skin damage)    Wound - Properties Group Placement Date: 09/10/23  -BL Placement Time: 2345  -BL Side: Right  -BL Orientation: distal  -BL Location: calf  -BL Primary Wound Type: MASD  -BL    Retired Wound - Properties Group Placement Date: 09/10/23  -BL Placement Time: 2345  -BL  Side: Right  -BL Orientation: distal  -BL Location: calf  -BL Primary Wound Type: MASD  -BL    Retired Wound - Properties Group Date first assessed: 09/10/23  -BL Time first assessed: 2345  -BL Side: Right  -BL Location: calf  -BL Primary Wound Type: MASD  -BL      Row Name             Wound 09/10/23 2345 Right anterior hip MASD (Moisture associated skin damage)    Wound - Properties Group Placement Date: 09/10/23  -BL Placement Time: 2345  -BL Present on Hospital Admission: Y  -BL Side: Right  -BL Orientation: anterior  -BL Location: hip  -BL Primary Wound Type: MASD  -BL    Retired Wound - Properties Group Placement Date: 09/10/23  -BL Placement Time: 2345  -BL Present on Hospital Admission: Y  -BL Side: Right  -BL Orientation: anterior  -BL Location: hip  -BL Primary Wound Type: MASD  -BL    Retired Wound - Properties Group Date first assessed: 09/10/23  -BL Time first assessed: 2345  -BL Present on Hospital Admission: Y  -BL Side: Right  -BL Location: hip  -BL Primary Wound Type: MASD  -BL      Row Name             Wound 09/10/23 2345 Left anterior hip MASD (Moisture associated skin damage)    Wound - Properties Group Placement Date: 09/10/23  -BL Placement Time: 2345  -BL Present on Hospital Admission: Y  -BL Side: Left  -BL Orientation: anterior  -BL Location: hip  -BL Primary Wound Type: MASD  -BL    Retired Wound - Properties Group Placement Date: 09/10/23  -BL Placement Time: 2345  -BL Present on Hospital Admission: Y  -BL Side: Left  -BL Orientation: anterior  -BL Location: hip  -BL Primary Wound Type: MASD  -BL    Retired Wound - Properties Group Date first assessed: 09/10/23  -BL Time first assessed: 2345  -BL Present on Hospital Admission: Y  -BL Side: Left  -BL Location: hip  -BL Primary Wound Type: MASD  -BL      Row Name             Wound 06/22/21 1133 Left lateral heel    Wound - Properties Group Placement Date: 06/22/21  -FABIOLA Placement Time: 1133  -FABIOLA Present on Hospital Admission: Y  -FABIOLA Side: Left   -FABIOLA Orientation: lateral  -FABIOLA Location: heel  -FABIOLA    Retired Wound - Properties Group Placement Date: 06/22/21  -FABIOLA Placement Time: 1133  -FABIOLA Present on Hospital Admission: Y  -FABIOLA Side: Left  -FABIOLA Orientation: lateral  -FABIOLA Location: heel  -FABIOLA    Retired Wound - Properties Group Date first assessed: 06/22/21  -FABIOLA Time first assessed: 1133  -FABIOLA Present on Hospital Admission: Y  -FABIOLA Side: Left  -FABIOLA Location: heel  -FABIOLA      Row Name             Wound 12/02/21 Right anterior foot Incision    Wound - Properties Group Placement Date: 12/02/21  -ES Side: Right  -ES Orientation: anterior  -ES Location: foot  -ES Primary Wound Type: Incision  -ES    Retired Wound - Properties Group Placement Date: 12/02/21  -ES Side: Right  -ES Orientation: anterior  -ES Location: foot  -ES Primary Wound Type: Incision  -ES    Retired Wound - Properties Group Date first assessed: 12/02/21  -ES Side: Right  -ES Location: foot  -ES Primary Wound Type: Incision  -ES      Row Name 09/12/23 1259          Plan of Care Review    Plan of Care Reviewed With patient (P)   -RH     Outcome Evaluation Patient presents with severe bilateral LE strength deficits and endurance impairments that have affected his functional mobility status below his baseline. He reports that he has been NWB for weeks but was able to get around independently with an assistive device prior to that. Skilled physical therapy is required to address his deficits. (P)   -RH       Row Name 09/12/23 1259          Therapy Assessment/Plan (PT)    Rehab Potential (PT) fair, will monitor progress closely (P)   -RH     Criteria for Skilled Interventions Met (PT) skilled treatment is necessary (P)   -RH     Therapy Frequency (PT) daily (P)   -RH     Predicted Duration of Therapy Intervention (PT) 10 days (P)   -RH     Problem List (PT) problems related to;balance;mobility;range of motion (ROM);pain;strength (P)   -RH     Activity Limitations Related to Problem List (PT) unable to ambulate  safely;unable to transfer safely;BADLs not performed adequately or safely;IADLs not performed adequately or safely;community activities not performed adequately or safely (P)   -       Row Name 09/12/23 1259          Therapy Plan Review/Discharge Plan (PT)    Therapy Plan Review (PT) evaluation/treatment results reviewed;care plan/treatment goals reviewed;participants included;patient;participants voiced agreement with care plan (P)   -[facility] Name 09/12/23 1259          Physical Therapy Goals    Bed Mobility Goal Selection (PT) bed mobility, PT goal 1 (P)   -RH     Transfer Goal Selection (PT) transfer, PT goal 1 (P)   -RH     Gait Training Goal Selection (PT) gait training, PT goal 1 (P)   -RH     Strength Goal Selection (PT) strength, PT goal 1 (P)   -       Row Name 09/12/23 1259          Bed Mobility Goal 1 (PT)    Activity/Assistive Device (Bed Mobility Goal 1, PT) bed mobility activities, all (P)   -RH     Dubois Level/Cues Needed (Bed Mobility Goal 1, PT) independent (P)   -RH     Time Frame (Bed Mobility Goal 1, PT) long term goal (LTG);10 days (P)   -[facility] Name 09/12/23 1259          Transfer Goal 1 (PT)    Activity/Assistive Device (Transfer Goal 1, PT) transfers, all (P)   -RH     Dubois Level/Cues Needed (Transfer Goal 1, PT) independent (P)   -RH     Time Frame (Transfer Goal 1, PT) long term goal (LTG);10 days (P)   -[facility] Name 09/12/23 1259          Strength Goal 1 (PT)    Strength Goal 1 (PT) 5/5 bilateral LE strength (P)   -RH     Time Frame (Strength Goal 1, PT) long term goal (LTG);10 days (P)   -RH               User Key  (r) = Recorded By, (t) = Taken By, (c) = Cosigned By      Initials Name Provider Type    BL Sarah Martinez, RN Registered Nurse    Marlen Vaughn, RN Registered Nurse    Katlyn Garcia, RN Registered Nurse    Ciro Frey RN Registered Nurse    Gamal Calero, PT Student PT Student                    Physical Therapy  Education       Title: PT OT SLP Therapies (Done)       Topic: Physical Therapy (Done)       Point: Mobility training (Done)       Learning Progress Summary             Patient Acceptance, E,TB, VU by  at 9/12/2023 1308                         Point: Home exercise program (Done)       Learning Progress Summary             Patient Acceptance, E,TB, VU by  at 9/12/2023 1308                         Point: Body mechanics (Done)       Learning Progress Summary             Patient Acceptance, E,TB, VU by  at 9/12/2023 1308                         Point: Precautions (Done)       Learning Progress Summary             Patient Acceptance, E,TB, VU by  at 9/12/2023 1308                                         User Key       Initials Effective Dates Name Provider Type Discipline     08/16/23 -  Gamal Simmons, PT Student PT Student PT                  PT Recommendation and Plan  Anticipated Discharge Disposition (PT): (P) inpatient rehabilitation facility  Planned Therapy Interventions (PT): (P) balance training, bed mobility training, gait training, home exercise program, neuromuscular re-education, motor coordination training, ROM (range of motion), stair training, strengthening, stretching, transfer training  Therapy Frequency (PT): (P) daily  Plan of Care Reviewed With: (P) patient  Outcome Evaluation: (P) Patient presents with severe bilateral LE strength deficits and endurance impairments that have affected his functional mobility status below his baseline. He reports that he has been NWB for weeks but was able to get around independently with an assistive device prior to that. Skilled physical therapy is required to address his deficits.   Outcome Measures       Row Name 09/12/23 1300             How much help from another person do you currently need...    Turning from your back to your side while in flat bed without using bedrails? 2 (P)   -      Moving from lying on back to sitting on the side of a  flat bed without bedrails? 2 (P)   -RH      Moving to and from a bed to a chair (including a wheelchair)? 1 (P)   -RH      Standing up from a chair using your arms (e.g., wheelchair, bedside chair)? 1 (P)   -RH      Climbing 3-5 steps with a railing? 1 (P)   -RH      To walk in hospital room? 1 (P)   -RH      AM-PAC 6 Clicks Score (PT) 8 (P)   -RH                User Key  (r) = Recorded By, (t) = Taken By, (c) = Cosigned By      Initials Name Provider Type     Gamal Simmons, PT Student PT Student                     Time Calculation:    PT Charges       Row Name 09/12/23 8209             Time Calculation    PT Received On 09/12/23 (P)   -      PT Goal Re-Cert Due Date 09/21/23 (P)   -RH         Untimed Charges    PT Eval/Re-eval Minutes 25 (P)   -RH         Total Minutes    Untimed Charges Total Minutes 25 (P)   -RH       Total Minutes 25 (P)   -RH                User Key  (r) = Recorded By, (t) = Taken By, (c) = Cosigned By      Initials Name Provider Type     Gamal Simmons, PT Student PT Student                      PT G-Codes  AM-PAC 6 Clicks Score (PT): (P) 8    Gamal Simmons, PT Student  9/12/2023

## 2023-09-12 NOTE — TELEPHONE ENCOUNTER
Caller: [facility]    Relationship to patient: Other    Best call back number: [phone]     DONATO FROM [facility] WANTED TO REACH OUT AND LET PROVIDER KNOW THAT PATIENT WAS ADMITTED TO [facility]  ON 9-10-23

## 2023-09-12 NOTE — CONSULTS
Discharge Planning Assessment  KEO Dominguez     Patient Name: Jose Shaikh  MRN: [id number]  Today's Date: 9/12/2023    Admit Date: 9/10/2023  Plan: Pt admitted due to generalized weakness. Pt had been at [facility] for rehab since June 23. Pt was discharged from [facility] September 10 and states he could not get out of the vehicle once he got home. States he called EMS to bring him to the hospital. Pt states he worked with therapy while at [facility], however the wound care center put him on non weight bearing status due to his foot wound and was unable to do aggressive treatment. Pt states he would be agreeable in returning to [facility] if insurance will approve again. Pt does have Medicaid secondary but reports that [facility] told him he would have to pay $300/day for therapy. SW to reach out to [facility] to see if they can accept pt back at discharge. PTOT pending. Will follow.     Discharge Needs Assessment       Row Name 09/12/23 1045       Living Environment    People in Home alone    Current Living Arrangements extended care facility    Duration at Residence June 2023- discharged home on 09/10    Primary Care Provided by self    Provides Primary Care For no one, unable/limited ability to care for self    Family Caregiver if Needed none    Quality of Family Relationships non-existent    Able to Return to Prior Arrangements other (see comments)       Resource/Environmental Concerns    Resource/Environmental Concerns home accessibility    Home Accessibility Concerns other (see comments)  Physically impaired       Transition Planning    Patient/Family Anticipates Transition to inpatient rehabilitation facility    Patient/Family Anticipated Services at Transition rehabilitation services;skilled nursing    Transportation Anticipated health plan transportation       Discharge Needs Assessment    Readmission Within the Last 30 Days no previous admission in last 30 days    Equipment Currently Used at Home  wheelchair    Concerns to be Addressed basic needs;discharge planning    Anticipated Changes Related to Illness inability to care for self    Discharge Facility/Level of Care Needs nursing facility, skilled    Current Discharge Risk dependent with mobility/activities of daily living              Discharge Plan       Row Name 09/12/23 1047       Plan    Plan Pt admitted due to generalized weakness. Pt had been at [facility] for rehab since June 23. Pt was discharged from [facility] September 10 and states he could not get out of the vehicle once he got home. States he called EMS to bring him to the hospital. Pt states he worked with therapy while at [facility], however the wound care center put him on non weight bearing status due to his foot wound and was unable to do aggressive treatment. Pt states he would be agreeable in returning to [facility] if insurance will approve again. Pt does have Medicaid secondary but reports that [facility] told him he would have to pay $300/day for therapy. SW to reach out to [facility] to see if they can accept pt back at discharge. PTOT pending. Will follow.    Patient/Family in Agreement with Plan yes              Demographic Summary       Row Name 09/12/23 1033       General Information    Admission Type inpatient    Arrived From emergency department    Referral Source admission list    Reason for Consult discharge planning    Preferred Language English                   Functional Status       Row Name 09/12/23 1042       Functional Status    Usual Activity Tolerance poor    Current Activity Tolerance poor       Functional Status, IADL    Medications independent    Meal Preparation assistive person    Housekeeping completely dependent    Laundry completely dependent    Shopping completely dependent       Mental Status    General Appearance WDL WDL                   Psychosocial       Row Name 09/12/23 1043       Behavior WDL    Behavior WDL WDL       Emotion Mood WDL     Emotion/Mood/Affect WDL WDL       Speech WDL    Speech WDL WDL       Perceptual State WDL    Perceptual State WDL WDL       Thought Process WDL    Thought Process WDL WDL       Intellectual Performance WDL    Intellectual Performance WDL WDL    Level of Consciousness Alert       Coping/Stress    Sources of Support Scientologist/[employer]    Techniques to [employer] with Loss/Stress/Change not applicable    Reaction to Health Status accepting    Understanding of Condition and Treatment adequate understanding of medical condition       Developmental Stage (Eriksson's)    Developmental Stage Stage 7 (35-65 years/Middle Adulthood) Generativity vs. Stagnation           KIMBERLY Mckeon

## 2023-09-12 NOTE — CONSULTS
Nutrition Services    Patient Name: Jose Shaikh  YOB: 1960  MRN: [id number]  Admission date: 9/10/2023      CLINICAL NUTRITION ASSESSMENT      Reason for Assessment  Identified at risk by screening criteria, Nonhealing wound or pressure ulcer     H&P:    Past Medical History:   Diagnosis Date    Absence of toe of right foot     Acute osteomyelitis of left calcaneus  8/18/2021    Anxiety and depression     Arthritis     Claustrophobia     Corns and callus     Diabetic ulcer of left heel associated with type 2 DM 8/18/2021    Diabetic ulcer of left heel associated with type 2 DM 7/6/2021    Diabetic ulcer of right midfoot associated with type 2 DM 8/18/2021    Difficulty walking     Essential hypertension 8/31/2021    Hammertoe     Hyperlipidemia LDL goal <100 8/31/2021    Ingrown toenail     Obesity     Paroxysmal atrial fibrillation 8/31/2021    Polyneuropathy     Pressure ulcer, stage 1     Tinea unguium     Type 2 diabetes mellitus with polyneuropathy         Current Problems:   Active Hospital Problems    Diagnosis     **Generalized weakness     Type 2 diabetes mellitus, with long-term current use of insulin     Class 3 severe obesity due to excess calories with serious comorbidity and body mass index (BMI) of 45.0 to 49.9 in adult     Dependence on wheelchair     Foot pain, bilateral     Paroxysmal atrial fibrillation     Essential hypertension     Diabetic ulcer of left heel associated with type 2 DM         Nutrition/Diet History         Narrative     62-year-old male admitted for generalized weakness.  Status post amputation of toes, to right foot, following osteomyelitis.  Past medical history described above.  Followed by RD related to nonhealing foot ulcer/surgical site.    Ulcer to left heel with significant improvement.  Nursing has noted Andrew score = 14.    Patient with elevated glucose level 215 mg/dL.  Blood glucose controlled with with insulin.A-fib treated with Eliquis.   "Hyperlipidemia treated with Lipitor.  MNT regarding drug nutrient interactions provided.    RD visited after breakfast reviewed weight loss from past 6 months.  Patient has intentionally modified diet to manage blood glucose and support weight loss.  RD reviewed success with patient.    Is indicative of obese for age.  Patient is 194% of ideal body weight 181 pounds.  Patient has lost 36 pounds in the past 6 months (9.7%).    Patient feeds self 75 to 100% of consistent carbohydrate diet, regular texture, thin liquids.  RD recommends increased protein with p.o. diet to aid in wound healing and skin integrity.    Nutrition acuity risk score = 8-moderate risk for decline.     Anthropometrics        Current Height, Weight Height: 188 cm (74\")  Weight: (!) 151 kg (334 lb)   Current BMI Body mass index is 42.88 kg/m².       Weight Hx  Wt Readings from Last 30 Encounters:   09/11/23 0030 (!) 151 kg (334 lb)   09/10/23 1844 (!) 154 kg (338 lb 10 oz)   08/30/23 1112 (!) 157 kg (347 lb)   08/01/23 0932 (!) 158 kg (347 lb 10.7 oz)   07/31/23 2347 (!) 163 kg (358 lb 7.5 oz)   06/16/23 2333 (!) 155 kg (342 lb 2.5 oz)   06/16/23 1053 (!) 155 kg (342 lb 3.2 oz)   06/15/23 0505 (!) 149 kg (328 lb 14.4 oz)   06/14/23 0455 (!) 149 kg (328 lb 4.8 oz)   06/13/23 1703 (!) 149 kg (328 lb 11.2 oz)   06/13/23 0600 (!) 170 kg (374 lb 12.5 oz)   06/12/23 0420 (!) 160 kg (352 lb 11.8 oz)   06/11/23 0447 (!) 150 kg (331 lb 5.6 oz)   06/10/23 0500 (!) 150 kg (330 lb 14.6 oz)   06/09/23 0536 (!) 156 kg (343 lb 7.6 oz)   06/08/23 1826 (!) 156 kg (344 lb 11.2 oz)   06/08/23 0522 (!) 158 kg (348 lb 8.8 oz)   06/07/23 0548 (!) 155 kg (342 lb 9.5 oz)   06/06/23 0515 (!) 155 kg (341 lb 14.9 oz)   06/05/23 0445 (!) 155 kg (341 lb 9.6 oz)   06/05/23 0348 (!) 158 kg (349 lb 3.3 oz)   06/04/23 0555 (!) 157 kg (346 lb 3.2 oz)   06/03/23 0539 (!) 158 kg (349 lb)   06/02/23 0548 (!) 163 kg (359 lb 3.2 oz)   06/01/23 0600 (!) 164 kg (362 lb)   05/31/23 0507 " (!) 164 kg (362 lb 4.8 oz)   05/30/23 0635 (!) 164 kg (362 lb 7 oz)   05/29/23 0500 (!) 167 kg (368 lb 2.7 oz)   05/28/23 0600 (!) 167 kg (367 lb 11.6 oz)   05/27/23 0600 (!) 167 kg (369 lb 0.8 oz)   05/26/23 0529 (!) 167 kg (369 lb 3.2 oz)   05/25/23 0600 (!) 168 kg (370 lb 3.2 oz)   05/24/23 0600 (!) 166 kg (366 lb 9.6 oz)   05/22/23 0529 (!) 169 kg (373 lb 7.4 oz)   05/21/23 0600 (!) 169 kg (371 lb 12.8 oz)   05/20/23 0600 (!) 171 kg (376 lb 5.2 oz)   05/19/23 0300 (!) 168 kg (370 lb 14.4 oz)   05/18/23 1912 (!) 168 kg (371 lb 7.6 oz)   05/18/23 0600 (!) 169 kg (371 lb 11.1 oz)   05/16/23 0700 (!) 171 kg (377 lb 4.8 oz)   05/14/23 0500 (!) 171 kg (377 lb 3.3 oz)   05/12/23 1143 (!) 170 kg (375 lb)   05/06/23 0258 (!) 170 kg (375 lb 8 oz)   04/19/23 0909 (!) 163 kg (359 lb)   04/03/23 1906 (!) 168 kg (370 lb)   03/27/23 0938 (!) 170 kg (373 lb 10.9 oz)   03/17/23 1153 (!) 168 kg (370 lb)   01/27/23 1501 (!) 168 kg (370 lb)   12/22/22 1501 (!) 171 kg (376 lb)   11/08/22 1035 (!) 161 kg (355 lb)   10/01/22 1141 (!) 164 kg (360 lb 10.8 oz)   05/18/22 1311 (!) 155 kg (341 lb)   03/24/22 1432 (!) 155 kg (341 lb)   03/02/22 1412 (!) 155 kg (341 lb)   01/12/22 1317 (!) 155 kg (341 lb)   12/30/21 1431 (!) 155 kg (341 lb)   12/01/21 1144 (!) 155 kg (341 lb 11.4 oz)   12/01/21 0843 (!) 157 kg (346 lb)   11/22/21 0839 (!) 157 kg (346 lb)   11/15/21 1148 (!) 157 kg (346 lb 12.5 oz)   11/09/21 1139 (!) 157 kg (345 lb 7.4 oz)   11/05/21 1130 (!) 159 kg (351 lb 3.1 oz)   11/02/21 1121 (!) 161 kg (356 lb)   10/27/21 1048 (!) 161 kg (356 lb)   10/21/21 1418 (!) 162 kg (356 lb 14.8 oz)   10/20/21 1120 (!) 160 kg (353 lb)            Wt Change Observation Patient has lost 36 pounds in the past 6 months (9.7%).     Estimated/Assessed Needs       Energy Requirements Estimated nutrient needs calculated using adjusted body weight 242 pounds / 110 kg   EST Needs (kcal/day) 25 kcals per kilogram = 2750 aracelis       Protein Requirements    EST  Daily Needs (g/day) 1.0 to 1.2 g/kg = 110 to 132 g of protein       Fluid Requirements     Estimated Needs (mL/day) 25 mL/kg = 2750 mL (11 to 12 cups)     Labs/Medications         Pertinent Labs Reviewed.   Results from last 7 days   Lab Units 09/12/23  0623 09/11/23  0523 09/10/23  1802   SODIUM mmol/L 138 140 121*   POTASSIUM mmol/L 3.7 3.7 4.1   CHLORIDE mmol/L 104 104 87*   CO2 mmol/L 24.3 26.3 22.6   BUN mg/dL 8 8 8   CREATININE mg/dL 0.52* 0.45* 0.58*   CALCIUM mg/dL 8.6 8.6 9.6   BILIRUBIN mg/dL  --   --  1.2   ALK PHOS U/L  --   --  178*   ALT (SGPT) U/L  --   --  30   AST (SGOT) U/L  --   --  40   GLUCOSE mg/dL 215* 178* 170*     Results from last 7 days   Lab Units 09/12/23  0623 09/10/23  1802   MAGNESIUM mg/dL  --  1.6   PHOSPHORUS mg/dL 3.9  --    HEMOGLOBIN g/dL 11.9* 13.8   HEMATOCRIT % 36.8* 44.3     COVID19   Date Value Ref Range Status   05/12/2023 Not Detected Not Detected - Ref. Range Final     Lab Results   Component Value Date    HGBA1C 6.60 (H) 04/19/2023         Pertinent Medications Reviewed.     Current Nutrition Orders & Evaluation of Intake       Oral Nutrition     Current PO Diet Diet: Diabetic Diets; Consistent Carbohydrate; Texture: Regular Texture (IDDSI 7); Fluid Consistency: Thin (IDDSI 0)   Supplement No active supplement orders       Malnutrition Severity Assessment                Nutrition Diagnosis         Nutrition Dx Problem 1 Increased nutrient needs related to increased nutrient needs due to catabolic disease as evidenced by  Non healing foot ulcer.       Nutrition Intervention         Consistent Carbohydrate Diet, regular texture, thin liquids  High protein diet.     Medical Nutrition Therapy/Nutrition Education          Learner     Readiness Patient  Acceptance     Method     Response Explanation  Verbalizes understanding     Monitor/Evaluation        Monitor Per protocol, PO intake, Weight, Skin status, POC/GOC       Nutrition Discharge Plan         To be determined        Electronically signed by:  Carmel Patino RD  09/12/23 08:03 EDT

## 2023-09-12 NOTE — SIGNIFICANT NOTE
09/12/23 1245   Coping/Psychosocial   Observed Emotional State calm;cooperative   Verbalized Emotional State hopefulness;acceptance   Trust Relationship/Rapport empathic listening provided   Involvement in Care interacting with patient   Additional Documentation Spiritual Care (Group)   Spiritual Care   Use of Spiritual Resources non-Mandaen use of spiritual care   Spiritual Care Source  initiative   Response to Spiritual Care receptive of support;thanks expressed   Spiritual Care Interventions supportive conversation provided   Spiritual Care Visit Type initial   Receptivity to Spiritual Care visit welcomed

## 2023-09-12 NOTE — PLAN OF CARE
Goal Outcome Evaluation:              Outcome Evaluation: aox4. medicated for c/o pain per mar. frequently turned and repositioned. woc rn evaluated patient this shift, wound and skin care provided per orders. blood glucose monitored.

## 2023-09-13 LAB
ANION GAP SERPL CALCULATED.3IONS-SCNC: 6.5 MMOL/L (ref 5–15)
BASOPHILS # BLD AUTO: 0.04 10*3/MM3 (ref 0–0.2)
BASOPHILS NFR BLD AUTO: 1 % (ref 0–1.5)
BUN SERPL-MCNC: 8 MG/DL (ref 8–23)
BUN/CREAT SERPL: 11.6 (ref 7–25)
CALCIUM SPEC-SCNC: 8.5 MG/DL (ref 8.6–10.5)
CHLORIDE SERPL-SCNC: 106 MMOL/L (ref 98–107)
CO2 SERPL-SCNC: 26.5 MMOL/L (ref 22–29)
CREAT SERPL-MCNC: 0.69 MG/DL (ref 0.76–1.27)
DEPRECATED RDW RBC AUTO: 61.1 FL (ref 37–54)
EGFRCR SERPLBLD CKD-EPI 2021: 103.3 ML/MIN/1.73
EOSINOPHIL # BLD AUTO: 0.15 10*3/MM3 (ref 0–0.4)
EOSINOPHIL NFR BLD AUTO: 3.8 % (ref 0.3–6.2)
ERYTHROCYTE [DISTWIDTH] IN BLOOD BY AUTOMATED COUNT: 20.8 % (ref 12.3–15.4)
GLUCOSE BLDC GLUCOMTR-MCNC: 185 MG/DL (ref 70–99)
GLUCOSE BLDC GLUCOMTR-MCNC: 186 MG/DL (ref 70–99)
GLUCOSE BLDC GLUCOMTR-MCNC: 199 MG/DL (ref 70–99)
GLUCOSE BLDC GLUCOMTR-MCNC: 212 MG/DL (ref 70–99)
GLUCOSE BLDC GLUCOMTR-MCNC: 227 MG/DL (ref 70–99)
GLUCOSE SERPL-MCNC: 202 MG/DL (ref 65–99)
HCT VFR BLD AUTO: 38.2 % (ref 37.5–51)
HGB BLD-MCNC: 11.9 G/DL (ref 13–17.7)
IMM GRANULOCYTES # BLD AUTO: 0.01 10*3/MM3 (ref 0–0.05)
IMM GRANULOCYTES NFR BLD AUTO: 0.3 % (ref 0–0.5)
LYMPHOCYTES # BLD AUTO: 1.23 10*3/MM3 (ref 0.7–3.1)
LYMPHOCYTES NFR BLD AUTO: 31 % (ref 19.6–45.3)
MCH RBC QN AUTO: 25.4 PG (ref 26.6–33)
MCHC RBC AUTO-ENTMCNC: 31.2 G/DL (ref 31.5–35.7)
MCV RBC AUTO: 81.6 FL (ref 79–97)
MONOCYTES # BLD AUTO: 0.55 10*3/MM3 (ref 0.1–0.9)
MONOCYTES NFR BLD AUTO: 13.9 % (ref 5–12)
NEUTROPHILS NFR BLD AUTO: 1.99 10*3/MM3 (ref 1.7–7)
NEUTROPHILS NFR BLD AUTO: 50 % (ref 42.7–76)
NRBC BLD AUTO-RTO: 0 /100 WBC (ref 0–0.2)
PLATELET # BLD AUTO: 120 10*3/MM3 (ref 140–450)
PMV BLD AUTO: 11 FL (ref 6–12)
POTASSIUM SERPL-SCNC: 4.5 MMOL/L (ref 3.5–5.2)
RBC # BLD AUTO: 4.68 10*6/MM3 (ref 4.14–5.8)
SODIUM SERPL-SCNC: 139 MMOL/L (ref 136–145)
WBC NRBC COR # BLD: 3.97 10*3/MM3 (ref 3.4–10.8)

## 2023-09-13 PROCEDURE — 97165 OT EVAL LOW COMPLEX 30 MIN: CPT

## 2023-09-13 PROCEDURE — 80048 BASIC METABOLIC PNL TOTAL CA: CPT | Performed by: FAMILY MEDICINE

## 2023-09-13 PROCEDURE — 63710000001 INSULIN LISPRO (HUMAN) PER 5 UNITS: Performed by: FAMILY MEDICINE

## 2023-09-13 PROCEDURE — 85025 COMPLETE CBC W/AUTO DIFF WBC: CPT | Performed by: FAMILY MEDICINE

## 2023-09-13 PROCEDURE — 63710000001 INSULIN DETEMIR PER 5 UNITS: Performed by: INTERNAL MEDICINE

## 2023-09-13 PROCEDURE — 82948 REAGENT STRIP/BLOOD GLUCOSE: CPT

## 2023-09-13 PROCEDURE — 99232 SBSQ HOSP IP/OBS MODERATE 35: CPT | Performed by: INTERNAL MEDICINE

## 2023-09-13 RX ORDER — ATORVASTATIN CALCIUM 10 MG/1
10 TABLET, FILM COATED ORAL NIGHTLY
Status: DISCONTINUED | OUTPATIENT
Start: 2023-09-13 | End: 2023-11-06 | Stop reason: HOSPADM

## 2023-09-13 RX ADMIN — FAMOTIDINE 40 MG: 20 TABLET, FILM COATED ORAL at 09:14

## 2023-09-13 RX ADMIN — INSULIN DETEMIR 20 UNITS: 100 INJECTION, SOLUTION SUBCUTANEOUS at 09:13

## 2023-09-13 RX ADMIN — APIXABAN 5 MG: 5 TABLET, FILM COATED ORAL at 21:15

## 2023-09-13 RX ADMIN — PREGABALIN 25 MG: 25 CAPSULE ORAL at 15:38

## 2023-09-13 RX ADMIN — HYDROCODONE BITARTRATE AND ACETAMINOPHEN 1 TABLET: 7.5; 325 TABLET ORAL at 02:06

## 2023-09-13 RX ADMIN — APIXABAN 5 MG: 5 TABLET, FILM COATED ORAL at 09:14

## 2023-09-13 RX ADMIN — Medication 10 ML: at 21:15

## 2023-09-13 RX ADMIN — Medication: at 12:50

## 2023-09-13 RX ADMIN — HYDROCODONE BITARTRATE AND ACETAMINOPHEN 1 TABLET: 7.5; 325 TABLET ORAL at 09:13

## 2023-09-13 RX ADMIN — BACLOFEN 10 MG: 10 TABLET ORAL at 09:12

## 2023-09-13 RX ADMIN — PREGABALIN 25 MG: 25 CAPSULE ORAL at 21:15

## 2023-09-13 RX ADMIN — INSULIN LISPRO 4 UNITS: 100 INJECTION, SOLUTION INTRAVENOUS; SUBCUTANEOUS at 18:20

## 2023-09-13 RX ADMIN — INSULIN LISPRO 4 UNITS: 100 INJECTION, SOLUTION INTRAVENOUS; SUBCUTANEOUS at 21:15

## 2023-09-13 RX ADMIN — INSULIN LISPRO 2 UNITS: 100 INJECTION, SOLUTION INTRAVENOUS; SUBCUTANEOUS at 12:50

## 2023-09-13 RX ADMIN — INSULIN LISPRO 2 UNITS: 100 INJECTION, SOLUTION INTRAVENOUS; SUBCUTANEOUS at 09:13

## 2023-09-13 RX ADMIN — HYDROCODONE BITARTRATE AND ACETAMINOPHEN 1 TABLET: 7.5; 325 TABLET ORAL at 21:15

## 2023-09-13 RX ADMIN — INSULIN DETEMIR 23 UNITS: 100 INJECTION, SOLUTION SUBCUTANEOUS at 21:15

## 2023-09-13 RX ADMIN — PREGABALIN 25 MG: 25 CAPSULE ORAL at 09:14

## 2023-09-13 RX ADMIN — ATORVASTATIN CALCIUM 10 MG: 10 TABLET, FILM COATED ORAL at 21:15

## 2023-09-13 RX ADMIN — Medication 10 ML: at 09:15

## 2023-09-13 RX ADMIN — BACLOFEN 10 MG: 10 TABLET ORAL at 21:15

## 2023-09-13 RX ADMIN — HYDROCODONE BITARTRATE AND ACETAMINOPHEN 1 TABLET: 7.5; 325 TABLET ORAL at 15:38

## 2023-09-13 NOTE — PLAN OF CARE
Goal Outcome Evaluation:  Plan of Care Reviewed With: patient           Outcome Evaluation: patient medicated for pain x2 due to left leg. Patients weight bearing status clarified with Dr. Maya. Patient is weight bearing as tolerated. Patient refusing turns regardless of education. Explaind the risk of not turning and it resulting in a wound or deep tissue injury on his backside/bottom. VSS. medicated for blood sugars as per EMAR

## 2023-09-13 NOTE — PLAN OF CARE
Goal Outcome Evaluation:  Plan of Care Reviewed With: patient        Progress: no change (first session: evaluation)  Outcome Evaluation: Patient presents with limitations of endurance/ activity tolerance, balance and cognition/safety awareness which are impacting ADL/transfer independence. Skilled OT is indicated to remediate/compensate for deficits to maximize independence and safety with functional tasks.      Anticipated Discharge Disposition (OT): inpatient rehabilitation facility, sub acute care setting

## 2023-09-13 NOTE — PROGRESS NOTES
[facility]   Hospitalist Progress Note  Date: 2023  Patient Name: Jose Shaikh  : 1960  MRN: [id number]  Date of admission: 9/10/2023  Room/Bed: [facility]/      Subjective   Subjective     Chief Complaint: Weakness    Summary:Jose Shaikh is a 62 y.o. male with multiple medical problems [facility].  He stated that he could barely move or get out of his car so EMS was called.  He said that due to nonweightbearing restrictions he was unable to work on his lower extremity weakness at rehab and was unable to care for himself upon discharge.    Interval Followup: Leg cramps gone.  Awaiting placement.    Review of Systems    All systems reviewed and negative except for what is outlined above.      Objective   Objective     Vitals:   Temp:  [97.5 °F (36.4 °C)-98.4 °F (36.9 °C)] 97.5 °F (36.4 °C)  Heart Rate:  [85-94] 86  Resp:  [18] 18  BP: ()/(31-60) 104/31    Physical Exam   General: Awake, alert, NAD  HENT: NCAT, MMM  Eyes: pupils equal, no scleral icterus  Cardiovascular: RRR, no murmurs   Pulmonary: CTA bilaterally; no wheezes; no conversational dyspnea  Gastrointestinal: S/ND/NT, +BS  Musculoskeletal: s/p partial amputation right foot  Skin: No jaundice, no rash on exposed skin appreciated  Neuro: CN II through XII grossly intact; speech clear; no tremor  Psych: Mood and affect appropriate      Result Review    Result Review:  I have personally reviewed these results:  [x]  Laboratory      Lab 23  0531 23  0623 09/10/23  1802   WBC 3.97 3.44 8.91   HEMOGLOBIN 11.9* 11.9* 13.8   HEMATOCRIT 38.2 36.8* 44.3   PLATELETS 120* 106* 156   NEUTROS ABS 1.99 1.84 6.59   IMMATURE GRANS (ABS) 0.01 0.01 0.03   LYMPHS ABS 1.23 1.00 1.44   MONOS ABS 0.55 0.46 0.70   EOS ABS 0.15 0.11 0.08   MCV 81.6 79.7 80.3           Lab 23  0531 23  0623 23  0523 09/10/23  1802   SODIUM 139 138 140 121*   POTASSIUM 4.5 3.7 3.7 4.1    CHLORIDE 106 104 104 87*   CO2 26.5 24.3 26.3 22.6   ANION GAP 6.5 9.7 9.7 11.4   BUN 8 8 8 8   CREATININE 0.69* 0.52* 0.45* 0.58*   EGFR 103.3 112.6 117.6 108.9   GLUCOSE 202* 215* 178* 170*   CALCIUM 8.5* 8.6 8.6 9.6   MAGNESIUM  --   --   --  1.6   PHOSPHORUS  --  3.9  --   --            Lab 09/12/23  0623 09/10/23  1802   TOTAL PROTEIN  --  7.0   ALBUMIN 2.9* 3.5   GLOBULIN  --  3.5   ALT (SGPT)  --  30   AST (SGOT)  --  40   BILIRUBIN  --  1.2   ALK PHOS  --  178*                       Brief Urine Lab Results  (Last result in the past 365 days)        Color   Clarity   Blood   Leuk Est   Nitrite   Protein   CREAT   Urine HCG        09/11/23 2200 Dark Yellow   Clear   Negative   Negative   Negative   Negative                 [x]  Microbiology   Microbiology Results (last 10 days)       ** No results found for the last 240 hours. **          [x]  Radiology  No radiology results for the last 7 days  []  EKG/Telemetry   []  Cardiology/Vascular   []  Pathology  []  Old records  []  Other:    Assessment & Plan   Assessment / Plan     Assessment:  Generalized weakness  Diabetes  Low sodium likely a lab error given drastic change this morning  Hypertension  History of atrial fibrillation  Morbid obesity  Debility with wheelchair dependent  Chronic wound    Plan:  Patient placed back in the hospital  PT/OT  Can be WBAT on left knee.  It appears he has OA and says he needs a knee replacement but has been told by ortho he has to lose weight.  Await rehab     Discussed with RN.    DVT prophylaxis:  Medical DVT prophylaxis orders are present.    CODE STATUS:        Electronically signed by Jose Maya MD, 09/13/23, 11:49 AM EDT.

## 2023-09-13 NOTE — THERAPY EVALUATION
Patient Name: Jose Shaikh  : 1960    MRN: [id number]                              Today's Date: 2023       Admit Date: 9/10/2023    Visit Dx:     ICD-10-CM ICD-9-CM   1. Generalized weakness  R53.1 780.79   2. Hyponatremia  E87.1 276.1   3. Difficulty in walking  R26.2 719.7   4. Decreased activities of daily living (ADL)  Z78.9 V49.89     Patient Active Problem List   Diagnosis    Diabetic ulcer of left heel associated with type 2 DM    Acute osteomyelitis of left calcaneus     Diabetic ulcer of left heel associated with type 2 DM    Diabetic ulcer of right midfoot associated with type 2 DM    Paroxysmal atrial fibrillation    Essential hypertension    Hyperlipidemia LDL goal <100    Cellulitis and abscess of foot    High alkaline phosphatase level    Osteomyelitis    Onychomycosis    Onychocryptosis    Foot pain, bilateral    Osteomyelitis of foot, right, acute    Cellulitis of right foot    Type 2 diabetes mellitus, with long-term current use of insulin    Class 3 severe obesity due to excess calories with serious comorbidity and body mass index (BMI) of 45.0 to 49.9 in adult    Anxiety disorder, unspecified    Claustrophobia    Dependence on wheelchair    Depression, unspecified    Long term (current) use of anticoagulants    Long term (current) use of oral hypoglycemic drugs    Wound of foot    Non-prs chronic ulcer oth prt r foot limited to brkdwn skin    Orthostatic hypotension    Other chronic osteomyelitis, right ankle and foot    Personal history of nicotine dependence    Thrombocytopenia, unspecified    Unspecified open wound, right foot, initial encounter    Diabetic foot infection    Subacute osteomyelitis of right foot    Right foot pain    Sepsis    Onychomycosis    Foot pain, left    Impaired mobility and ADLs    Absence of toe of right foot    Corns and callosity    Disability of walking    Fracture    Limb swelling    Polyneuropathy    Pressure ulcer, stage 1    Shortness of breath     Generalized weakness     Past Medical History:   Diagnosis Date    Absence of toe of right foot     Acute osteomyelitis of left calcaneus  8/18/2021    Anxiety and depression     Arthritis     Claustrophobia     Corns and callus     Diabetic ulcer of left heel associated with type 2 DM 8/18/2021    Diabetic ulcer of left heel associated with type 2 DM 7/6/2021    Diabetic ulcer of right midfoot associated with type 2 DM 8/18/2021    Difficulty walking     Essential hypertension 8/31/2021    Hammertoe     Hyperlipidemia LDL goal <100 8/31/2021    Ingrown toenail     Obesity     Paroxysmal atrial fibrillation 8/31/2021    Polyneuropathy     Pressure ulcer, stage 1     Tinea unguium     Type 2 diabetes mellitus with polyneuropathy      Past Surgical History:   Procedure Laterality Date    CYST REMOVAL      center of back; benign    INCISION AND DRAINAGE ABSCESS      back    INCISION AND DRAINAGE LEG Right 12/10/2021    Procedure: INCISION AND DRAINAGE LOWER EXTREMITY;  Surgeon: Ash Leyva DPM;  Location: [facility];  Service: Podiatry;  Laterality: Right;    OTHER SURGICAL HISTORY      Surgical clips left foot    TOE SURGERY Right     Removal of 5th toe    TRANS METATARSAL AMPUTATION Right 12/2/2021    Procedure: AMPUTATION TRANS METATARSAL;  Surgeon: Ash Leyva DPM;  Location: [facility] OR;  Service: Podiatry;  Laterality: Right;    WRIST SURGERY Left     repair of injury      General Information       Row Name 09/13/23 1038          OT Time and Intention    Document Type evaluation  -AV     Mode of Treatment individual therapy;occupational therapy  -AV       Row Name 09/13/23 1038          General Information    Patient Profile Reviewed yes  -AV     Prior Level of Function --  (I) ADL/ functional mobility w RW at baseline (May 2023). Recent dc sub-acute rehab 9/10/23 with pt reporting: (I) feeding, grooming, UB bath/ dress. (I) urinal in bed/ was still using bedpan. Reports mod  assist LB dress. Inconsistent info/ questionable.  -AV     Barriers to Rehab previous functional deficit  -AV       Row Name 09/13/23 1038          Occupational Profile    Reason for Services/Referral (Occupational Profile) Patient is a 62 year old male admitted to [facility] on 9/10/23. He was discharged from [facility] same day after lengthy stay at sub-acute rehab (since 6/23/23). He is currently on 4NT/ room air. OT consulted due to impaired ADL/transfer independence. No previous OT services for current condition.  -AV       Row Name 09/13/23 1038          Living Environment    People in Home alone  -AV       Row Name 09/13/23 1038          Home Main Entrance    Number of Stairs, Main Entrance two  -AV       Row Name 09/13/23 1038          Cognition    Orientation Status (Cognition) --  alert. able to follow commands. questionable insight/ safety awareness due to responses throughout evaluation.  -AV       Row Name 09/13/23 1038          Safety Issues, Functional Mobility    Impairments Affecting Function (Mobility) balance;endurance/activity tolerance;cognition  likely balance- declined testing  -AV               User Key  (r) = Recorded By, (t) = Taken By, (c) = Cosigned By      Initials Name Provider Type    Uvaldo Hardy OT Occupational Therapist                     Mobility/ADL's       Row Name 09/13/23 1053          Bed Mobility    Bed Mobility sit-sidelying  -AV     Comment, (Bed Mobility) unable to transfer suping to long-sitting with trapeze bar/rails  -AV       Row Name 09/13/23 1053          Activities of Daily Living    BADL Assessment/Intervention --  (I) feeding, grooming with setup in bed. Mod assist bathing/ max assist dressing. (I) urinal in bed/ bedpan.  -AV               User Key  (r) = Recorded By, (t) = Taken By, (c) = Cosigned By      Initials Name Provider Type    Uvaldo Hardy OT Occupational Therapist                   Obj/Interventions       Row Name 09/13/23  1054          Sensory Assessment (Somatosensory)    Sensory Assessment (Somatosensory) UE sensation intact  -AV       [facility] Name 09/13/23 1054          Vision Assessment/Intervention    Visual Impairment/Limitations WFL;corrective lenses for reading  -AV       [facility] Name 09/13/23 1054          Range of Motion Comprehensive    General Range of Motion upper extremity range of motion deficits identified  -AV     Comment, General Range of Motion left #5 flexion contracture  -AV       [facility] Name 09/13/23 1054          Strength Comprehensive (MMT)    Comment, General Manual Muscle Testing (MMT) Assessment 4+/5 bilateral biceps, triceps and   -AV       [facility] Name 09/13/23 1054          Motor Skills    Motor Skills coordination;functional endurance  -AV     Coordination --  right dominant- diminished prior to onset. compensates with pullover clothing.  -AV     Functional Endurance fair minus  -AV       [facility] Name 09/13/23 1054          Balance    Comment, Balance impaired  -AV               User Key  (r) = Recorded By, (t) = Taken By, (c) = Cosigned By      Initials Name Provider Type    AV Uvaldo Christine OT Occupational Therapist                   Goals/Plan       Row Name 09/13/23 1057          Transfer Goal 1 (OT)    Activity/Assistive Device (Transfer Goal 1, OT) transfers, all  -AV     [place] Level/Cues Needed (Transfer Goal 1, OT) modified independence  -AV     Time Frame (Transfer Goal 1, OT) long term goal (LTG);10 days  -AV       Row Name 09/13/23 1057          Bathing Goal 1 (OT)    Activity/Device (Bathing Goal 1, OT) bathing skills, all  -AV     [place] Level/Cues Needed (Bathing Goal 1, OT) modified independence  -AV     Time Frame (Bathing Goal 1, OT) long term goal (LTG);10 days  -AV       Row Name 09/13/23 1057          Dressing Goal 1 (OT)    Activity/Device (Dressing Goal 1, OT) dressing skills, all  -AV     [place]/Cues Needed (Dressing Goal 1, OT) modified independence  -AV     Time Frame  (Dressing Goal 1, OT) long term goal (LTG);10 days  -AV       Row Name 09/13/23 1057          Toileting Goal 1 (OT)    Activity/Device (Toileting Goal 1, OT) toileting skills, all;raised toilet seat  -AV     [place] Level/Cues Needed (Toileting Goal 1, OT) modified independence  -AV     Time Frame (Toileting Goal 1, OT) long term goal (LTG);10 days  -AV       Row Name 09/13/23 1057          Grooming Goal 1 (OT)    Activity/Device (Grooming Goal 1, OT) grooming skills, all  -AV     [place] (Grooming Goal 1, OT) modified independence  partial stand at sink  -AV     Time Frame (Grooming Goal 1, OT) long term goal (LTG);10 days  -AV       Row Name 09/13/23 1057          Problem Specific Goal 1 (OT)    Problem Specific Goal 1 (OT) Patient will demonstrate fair plus endurance/activity tolerance needed to support ADLs.  -AV     Time Frame (Problem Specific Goal 1, OT) long term goal (LTG);10 days  -AV       Row Name 09/13/23 1057          Therapy Assessment/Plan (OT)    Planned Therapy Interventions (OT) activity tolerance training;BADL retraining;functional balance retraining;occupation/activity based interventions;patient/caregiver education/training;transfer/mobility retraining  -AV               User Key  (r) = Recorded By, (t) = Taken By, (c) = Cosigned By      Initials Name Provider Type    Uvaldo Hardy OT Occupational Therapist                   Clinical Impression       Row Name 09/13/23 1056          Pain Assessment    Pain Intervention(s) Nursing Notified;Repositioned  -AV     Additional Documentation Pain Scale: FACES Pre/Post-Treatment (Group)  -AV       Row Name 09/13/23 1056          Pain Scale: FACES Pre/Post-Treatment    Pain: FACES Scale, Pretreatment 6-->hurts even more  -AV     Posttreatment Pain Rating 6-->hurts even more  -AV     Pain Location - Side/Orientation Left  -AV     Pain Location lower  -AV     Pain Location - extremity  -AV     Pre/Posttreatment Pain Comment with minimal  movement in attempt for position change  -AV       Row Name 09/13/23 1056          Plan of Care Review    Plan of Care Reviewed With patient  -AV     Progress no change  first session: evaluation  -AV     Outcome Evaluation Patient presents with limitations of endurance/ activity tolerance, balance and cognition/safety awareness which are impacting ADL/transfer independence. Skilled OT is indicated to remediate/compensate for deficits to maximize independence and safety with functional tasks.  -AV       Row Name 09/13/23 1056          Therapy Assessment/Plan (OT)    Patient/Family Therapy Goal Statement (OT) none stated  -AV     Rehab Potential (OT) fair, will monitor progress closely  -AV     Criteria for Skilled Therapeutic Interventions Met (OT) yes;meets criteria;skilled treatment is necessary  -AV     Therapy Frequency (OT) 5 times/wk  -AV       Row Name 09/13/23 1056          Therapy Plan Review/Discharge Plan (OT)    Anticipated Discharge Disposition (OT) inpatient rehabilitation facility;sub acute care setting  -AV       Row Name 09/13/23 1056          Vital Signs    O2 Delivery Pre Treatment room air  -AV     O2 Delivery Intra Treatment room air  -AV     O2 Delivery Post Treatment room air  -AV               User Key  (r) = Recorded By, (t) = Taken By, (c) = Cosigned By      Initials Name Provider Type    AV Uvaldo Christine, OT Occupational Therapist                   Outcome Measures       Row Name 09/13/23 1052          How much help from another is currently needed...    Putting on and taking off regular lower body clothing? 2  -AV     Bathing (including washing, rinsing, and drying) 2  -AV     Toileting (which includes using toilet bed pan or urinal) 2  -AV     Putting on and taking off regular upper body clothing 3  -AV     Taking care of personal grooming (such as brushing teeth) 4  -AV     Eating meals 4  -AV     AM-PAC 6 Clicks Score (OT) 17  -AV       Row Name 09/13/23 1056          Functional  Assessment    Outcome Measure Options AM-PAC 6 Clicks Daily Activity (OT);Optimal Instrument  -AV       Row Name 09/13/23 1059          Optimal Instrument    Optimal Instrument Optimal - 3  -AV     Bending/Stooping 5  -AV     Standing 5  -AV     Reaching 1  -AV     From the list, choose the 3 activities you would most like to be able to do without any difficulty Bending/stooping;Standing;Reaching  -AV     Total Score Optimal - 3 11  -AV               User Key  (r) = Recorded By, (t) = Taken By, (c) = Cosigned By      Initials Name Provider Type    Uvaldo Hardy OT Occupational Therapist                    Occupational Therapy Education       Title: PT OT SLP Therapies (Done)       Topic: Occupational Therapy (Done)       Point: ADL training (Done)       Description:   Instruct learner(s) on proper safety adaptation and remediation techniques during self care or transfers.   Instruct in proper use of assistive devices.                  Learning Progress Summary             Patient Acceptance, E, VU by AV at 9/13/2023 1059                         Point: Home exercise program (Done)       Description:   Instruct learner(s) on appropriate technique for monitoring, assisting and/or progressing therapeutic exercises/activities.                  Learning Progress Summary             Patient Acceptance, E, VU by AV at 9/13/2023 1059                         Point: Precautions (Done)       Description:   Instruct learner(s) on prescribed precautions during self-care and functional transfers.                  Learning Progress Summary             Patient Acceptance, E, VU by AV at 9/13/2023 1059                         Point: Body mechanics (Done)       Description:   Instruct learner(s) on proper positioning and spine alignment during self-care, functional mobility activities and/or exercises.                  Learning Progress Summary             Patient Acceptance, E, VU by AV at 9/13/2023 1059                                          User Key       Initials Effective Dates Name Provider Type Discipline    AV 06/16/21 -  Uvaldo Christine OT Occupational Therapist OT                  OT Recommendation and Plan  Planned Therapy Interventions (OT): activity tolerance training, BADL retraining, functional balance retraining, occupation/activity based interventions, patient/caregiver education/training, transfer/mobility retraining  Therapy Frequency (OT): 5 times/wk  Plan of Care Review  Plan of Care Reviewed With: patient  Progress: no change (first session: evaluation)  Outcome Evaluation: Patient presents with limitations of endurance/ activity tolerance, balance and cognition/safety awareness which are impacting ADL/transfer independence. Skilled OT is indicated to remediate/compensate for deficits to maximize independence and safety with functional tasks.     Time Calculation:   Evaluation Complexity (OT)  Review Occupational Profile/Medical/Therapy History Complexity: expanded/moderate complexity  Assessment, Occupational Performance/Identification of Deficit Complexity: 3-5 performance deficits  Clinical Decision Making Complexity (OT): problem focused assessment/low complexity  Overall Complexity of Evaluation (OT): low complexity     Time Calculation- OT       Row Name 09/13/23 1100             Time Calculation- OT    OT Received On 09/13/23  -AV      OT Goal Re-Cert Due Date 09/22/23  -AV         Untimed Charges    OT Eval/Re-eval Minutes 35  -AV         Total Minutes    Untimed Charges Total Minutes 35  -AV       Total Minutes 35  -AV                User Key  (r) = Recorded By, (t) = Taken By, (c) = Cosigned By      Initials Name Provider Type    AV Uvaldo Christine OT Occupational Therapist                  Therapy Charges for Today       Code Description Service Date Service Provider Modifiers Qty    93842592743 HC OT EVAL LOW COMPLEXITY 3 9/13/2023 Uvaldo Christine OT GO 1                 Uvaldo Christine OT  9/13/2023

## 2023-09-14 LAB
BASOPHILS # BLD AUTO: 0.03 10*3/MM3 (ref 0–0.2)
BASOPHILS NFR BLD AUTO: 0.7 % (ref 0–1.5)
DEPRECATED RDW RBC AUTO: 59.7 FL (ref 37–54)
EOSINOPHIL # BLD AUTO: 0.1 10*3/MM3 (ref 0–0.4)
EOSINOPHIL NFR BLD AUTO: 2.5 % (ref 0.3–6.2)
ERYTHROCYTE [DISTWIDTH] IN BLOOD BY AUTOMATED COUNT: 20.4 % (ref 12.3–15.4)
FOLATE SERPL-MCNC: 7.26 NG/ML (ref 4.78–24.2)
GLUCOSE BLDC GLUCOMTR-MCNC: 207 MG/DL (ref 70–99)
GLUCOSE BLDC GLUCOMTR-MCNC: 230 MG/DL (ref 70–99)
GLUCOSE BLDC GLUCOMTR-MCNC: 249 MG/DL (ref 70–99)
GLUCOSE BLDC GLUCOMTR-MCNC: 251 MG/DL (ref 70–99)
HCT VFR BLD AUTO: 38.7 % (ref 37.5–51)
HGB BLD-MCNC: 11.9 G/DL (ref 13–17.7)
IMM GRANULOCYTES # BLD AUTO: 0.01 10*3/MM3 (ref 0–0.05)
IMM GRANULOCYTES NFR BLD AUTO: 0.2 % (ref 0–0.5)
IRON 24H UR-MRATE: 58 MCG/DL (ref 59–158)
IRON SATN MFR SERPL: 17 % (ref 20–50)
LYMPHOCYTES # BLD AUTO: 0.96 10*3/MM3 (ref 0.7–3.1)
LYMPHOCYTES NFR BLD AUTO: 23.7 % (ref 19.6–45.3)
MCH RBC QN AUTO: 24.9 PG (ref 26.6–33)
MCHC RBC AUTO-ENTMCNC: 30.7 G/DL (ref 31.5–35.7)
MCV RBC AUTO: 81.1 FL (ref 79–97)
MONOCYTES # BLD AUTO: 0.41 10*3/MM3 (ref 0.1–0.9)
MONOCYTES NFR BLD AUTO: 10.1 % (ref 5–12)
NEUTROPHILS NFR BLD AUTO: 2.54 10*3/MM3 (ref 1.7–7)
NEUTROPHILS NFR BLD AUTO: 62.8 % (ref 42.7–76)
NRBC BLD AUTO-RTO: 0 /100 WBC (ref 0–0.2)
PLATELET # BLD AUTO: 128 10*3/MM3 (ref 140–450)
PMV BLD AUTO: 11.8 FL (ref 6–12)
RBC # BLD AUTO: 4.77 10*6/MM3 (ref 4.14–5.8)
TIBC SERPL-MCNC: 340 MCG/DL (ref 298–536)
TRANSFERRIN SERPL-MCNC: 228 MG/DL (ref 200–360)
VIT B12 BLD-MCNC: 466 PG/ML (ref 211–946)
WBC NRBC COR # BLD: 4.05 10*3/MM3 (ref 3.4–10.8)

## 2023-09-14 PROCEDURE — 97530 THERAPEUTIC ACTIVITIES: CPT

## 2023-09-14 PROCEDURE — 63710000001 INSULIN DETEMIR PER 5 UNITS: Performed by: INTERNAL MEDICINE

## 2023-09-14 PROCEDURE — 83540 ASSAY OF IRON: CPT | Performed by: INTERNAL MEDICINE

## 2023-09-14 PROCEDURE — 63710000001 INSULIN LISPRO (HUMAN) PER 5 UNITS: Performed by: FAMILY MEDICINE

## 2023-09-14 PROCEDURE — 84466 ASSAY OF TRANSFERRIN: CPT | Performed by: INTERNAL MEDICINE

## 2023-09-14 PROCEDURE — 99232 SBSQ HOSP IP/OBS MODERATE 35: CPT | Performed by: INTERNAL MEDICINE

## 2023-09-14 PROCEDURE — 85025 COMPLETE CBC W/AUTO DIFF WBC: CPT | Performed by: FAMILY MEDICINE

## 2023-09-14 PROCEDURE — 82948 REAGENT STRIP/BLOOD GLUCOSE: CPT

## 2023-09-14 PROCEDURE — 82746 ASSAY OF FOLIC ACID SERUM: CPT | Performed by: INTERNAL MEDICINE

## 2023-09-14 PROCEDURE — 82607 VITAMIN B-12: CPT | Performed by: INTERNAL MEDICINE

## 2023-09-14 RX ADMIN — BACLOFEN 10 MG: 10 TABLET ORAL at 22:09

## 2023-09-14 RX ADMIN — INSULIN DETEMIR 23 UNITS: 100 INJECTION, SOLUTION SUBCUTANEOUS at 08:51

## 2023-09-14 RX ADMIN — INSULIN DETEMIR 23 UNITS: 100 INJECTION, SOLUTION SUBCUTANEOUS at 20:28

## 2023-09-14 RX ADMIN — ATORVASTATIN CALCIUM 10 MG: 10 TABLET, FILM COATED ORAL at 20:32

## 2023-09-14 RX ADMIN — INSULIN LISPRO 4 UNITS: 100 INJECTION, SOLUTION INTRAVENOUS; SUBCUTANEOUS at 20:28

## 2023-09-14 RX ADMIN — HYDROCODONE BITARTRATE AND ACETAMINOPHEN 1 TABLET: 7.5; 325 TABLET ORAL at 13:43

## 2023-09-14 RX ADMIN — FAMOTIDINE 40 MG: 20 TABLET, FILM COATED ORAL at 08:49

## 2023-09-14 RX ADMIN — APIXABAN 5 MG: 5 TABLET, FILM COATED ORAL at 20:29

## 2023-09-14 RX ADMIN — BACLOFEN 10 MG: 10 TABLET ORAL at 08:49

## 2023-09-14 RX ADMIN — APIXABAN 5 MG: 5 TABLET, FILM COATED ORAL at 08:49

## 2023-09-14 RX ADMIN — Medication 10 ML: at 08:55

## 2023-09-14 RX ADMIN — INSULIN LISPRO 4 UNITS: 100 INJECTION, SOLUTION INTRAVENOUS; SUBCUTANEOUS at 08:50

## 2023-09-14 RX ADMIN — PREGABALIN 25 MG: 25 CAPSULE ORAL at 16:58

## 2023-09-14 RX ADMIN — Medication 10 ML: at 20:28

## 2023-09-14 RX ADMIN — PREGABALIN 25 MG: 25 CAPSULE ORAL at 20:29

## 2023-09-14 RX ADMIN — PREGABALIN 25 MG: 25 CAPSULE ORAL at 08:49

## 2023-09-14 RX ADMIN — INSULIN LISPRO 6 UNITS: 100 INJECTION, SOLUTION INTRAVENOUS; SUBCUTANEOUS at 13:42

## 2023-09-14 RX ADMIN — HYDROCODONE BITARTRATE AND ACETAMINOPHEN 1 TABLET: 7.5; 325 TABLET ORAL at 08:49

## 2023-09-14 RX ADMIN — INSULIN LISPRO 4 UNITS: 100 INJECTION, SOLUTION INTRAVENOUS; SUBCUTANEOUS at 17:18

## 2023-09-14 RX ADMIN — HYDROCODONE BITARTRATE AND ACETAMINOPHEN 1 TABLET: 7.5; 325 TABLET ORAL at 21:35

## 2023-09-14 RX ADMIN — Medication: at 13:44

## 2023-09-14 NOTE — PROGRESS NOTES
[facility]   Hospitalist Progress Note  Date: 2023  Patient Name: Jose Shaikh  : 1960  MRN: [id number]  Date of admission: 9/10/2023  Room/Bed: [facility]/      Subjective   Subjective     Chief Complaint: Weakness    Summary:Jose Shaikh is a 62 y.o. male with multiple medical problems [facility].  He stated that he could barely move or get out of his car so EMS was called.  He said that due to nonweightbearing restrictions he was unable to work on his lower extremity weakness at rehab and was unable to care for himself upon discharge.    Interval Followup: no new complaints    Review of Systems    All systems reviewed and negative except for what is outlined above.      Objective   Objective     Vitals:   Temp:  [97.9 °F (36.6 °C)-98.4 °F (36.9 °C)] 97.9 °F (36.6 °C)  Heart Rate:  [86-95] 95  Resp:  [18-20] 20  BP: (108-131)/(44-88) 115/44    Physical Exam   General: Awake, alert, NAD  HENT: NCAT, MMM  Eyes: pupils equal, no scleral icterus  Cardiovascular: RRR, no murmurs   Pulmonary: CTA bilaterally; no wheezes; no conversational dyspnea  Gastrointestinal: S/ND/NT, +BS  Musculoskeletal: s/p partial amputation right foot  Skin: No jaundice, no rash on exposed skin appreciated  Neuro: CN II through XII grossly intact; speech clear; no tremor  Psych: Mood and affect appropriate      Result Review    Result Review:  I have personally reviewed these results:  [x]  Laboratory      Lab 23  0643 2331 23   WBC 4.05 3.97 3.44   HEMOGLOBIN 11.9* 11.9* 11.9*   HEMATOCRIT 38.7 38.2 36.8*   PLATELETS 128* 120* 106*   NEUTROS ABS 2.54 1.99 1.84   IMMATURE GRANS (ABS) 0.01 0.01 0.01   LYMPHS ABS 0.96 1.23 1.00   MONOS ABS 0.41 0.55 0.46   EOS ABS 0.10 0.15 0.11   MCV 81.1 81.6 79.7           Lab 23  0531 23  0623 23  0523 09/10/23  1802   SODIUM 139 138 140 121*   POTASSIUM 4.5 3.7 3.7 4.1   CHLORIDE 106 104 104  87*   CO2 26.5 24.3 26.3 22.6   ANION GAP 6.5 9.7 9.7 11.4   BUN 8 8 8 8   CREATININE 0.69* 0.52* 0.45* 0.58*   EGFR 103.3 112.6 117.6 108.9   GLUCOSE 202* 215* 178* 170*   CALCIUM 8.5* 8.6 8.6 9.6   MAGNESIUM  --   --   --  1.6   PHOSPHORUS  --  3.9  --   --            Lab 09/12/23  0623 09/10/23  1802   TOTAL PROTEIN  --  7.0   ALBUMIN 2.9* 3.5   GLOBULIN  --  3.5   ALT (SGPT)  --  30   AST (SGOT)  --  40   BILIRUBIN  --  1.2   ALK PHOS  --  178*                   Lab 09/14/23  0643   IRON 58*   IRON SATURATION (TSAT) 17*   TIBC 340   TRANSFERRIN 228   FOLATE 7.26   VITAMIN B 12 466         Brief Urine Lab Results  (Last result in the past 365 days)        Color   Clarity   Blood   Leuk Est   Nitrite   Protein   CREAT   Urine HCG        09/11/23 2200 Dark Yellow   Clear   Negative   Negative   Negative   Negative                 [x]  Microbiology   Microbiology Results (last 10 days)       ** No results found for the last 240 hours. **          [x]  Radiology  No radiology results for the last 7 days  []  EKG/Telemetry   []  Cardiology/Vascular   []  Pathology  []  Old records  []  Other:    Assessment & Plan   Assessment / Plan     Assessment:  Generalized weakness  Diabetes  Low sodium likely a lab error given drastic change this morning  Hypertension  History of atrial fibrillation  Morbid obesity  Debility with wheelchair dependent  Chronic wound    Plan:  Patient placed back in the hospital  PT/OT  Continue current medications  Placement may be a challenge    Discussed with RN.    DVT prophylaxis:  Medical DVT prophylaxis orders are present.    CODE STATUS:        Electronically signed by Jose Maya MD, 09/14/23, 4:23 PM EDT.

## 2023-09-14 NOTE — THERAPY TREATMENT NOTE
Acute Care - Physical Therapy Treatment Note  KEO Dominguez     Patient Name: Jose Shaikh  : 1960  MRN: [id number]  Today's Date: 2023      Visit Dx:     ICD-10-CM ICD-9-CM   1. Generalized weakness  R53.1 780.79   2. Hyponatremia  E87.1 276.1   3. Difficulty in walking  R26.2 719.7   4. Decreased activities of daily living (ADL)  Z78.9 V49.89     Patient Active Problem List   Diagnosis    Diabetic ulcer of left heel associated with type 2 DM    Acute osteomyelitis of left calcaneus     Diabetic ulcer of left heel associated with type 2 DM    Diabetic ulcer of right midfoot associated with type 2 DM    Paroxysmal atrial fibrillation    Essential hypertension    Hyperlipidemia LDL goal <100    Cellulitis and abscess of foot    High alkaline phosphatase level    Osteomyelitis    Onychomycosis    Onychocryptosis    Foot pain, bilateral    Osteomyelitis of foot, right, acute    Cellulitis of right foot    Type 2 diabetes mellitus, with long-term current use of insulin    Class 3 severe obesity due to excess calories with serious comorbidity and body mass index (BMI) of 45.0 to 49.9 in adult    Anxiety disorder, unspecified    Claustrophobia    Dependence on wheelchair    Depression, unspecified    Long term (current) use of anticoagulants    Long term (current) use of oral hypoglycemic drugs    Wound of foot    Non-prs chronic ulcer oth prt r foot limited to brkdwn skin    Orthostatic hypotension    Other chronic osteomyelitis, right ankle and foot    Personal history of nicotine dependence    Thrombocytopenia, unspecified    Unspecified open wound, right foot, initial encounter    Diabetic foot infection    Subacute osteomyelitis of right foot    Right foot pain    Sepsis    Onychomycosis    Foot pain, left    Impaired mobility and ADLs    Absence of toe of right foot    Corns and callosity    Disability of walking    Fracture    Limb swelling    Polyneuropathy    Pressure ulcer, stage 1    Shortness of  breath    Generalized weakness     Past Medical History:   Diagnosis Date    Absence of toe of right foot     Acute osteomyelitis of left calcaneus  8/18/2021    Anxiety and depression     Arthritis     Claustrophobia     Corns and callus     Diabetic ulcer of left heel associated with type 2 DM 8/18/2021    Diabetic ulcer of left heel associated with type 2 DM 7/6/2021    Diabetic ulcer of right midfoot associated with type 2 DM 8/18/2021    Difficulty walking     Essential hypertension 8/31/2021    Hammertoe     Hyperlipidemia LDL goal <100 8/31/2021    Ingrown toenail     Obesity     Paroxysmal atrial fibrillation 8/31/2021    Polyneuropathy     Pressure ulcer, stage 1     Tinea unguium     Type 2 diabetes mellitus with polyneuropathy      Past Surgical History:   Procedure Laterality Date    CYST REMOVAL      center of back; benign    INCISION AND DRAINAGE ABSCESS      back    INCISION AND DRAINAGE LEG Right 12/10/2021    Procedure: INCISION AND DRAINAGE LOWER EXTREMITY;  Surgeon: Ash Leyva DPM;  Location: [facility];  Service: Podiatry;  Laterality: Right;    OTHER SURGICAL HISTORY      Surgical clips left foot    TOE SURGERY Right     Removal of 5th toe    TRANS METATARSAL AMPUTATION Right 12/2/2021    Procedure: AMPUTATION TRANS METATARSAL;  Surgeon: Ash Leyva DPM;  Location: [facility];  Service: Podiatry;  Laterality: Right;    WRIST SURGERY Left     repair of injury     PT Assessment (last 12 hours)       PT Evaluation and Treatment       Row Name 09/14/23 5319          Physical Therapy Time and Intention    Document Type therapy note (daily note)  -AV     Mode of Treatment individual therapy;physical therapy  -AV     Comment Education and encouragement provided to patient to participate in activity/repositioning with nursing and PCAs. Also encouraged patient to continue working with thearpy services to increase strength and mobility.  -AV       Row Name 09/14/23 6310           Bed Mobility    Bed Mobility supine-sit;sit-supine  -AV     Supine-Sit Cecil (Bed Mobility) minimum assist (75% patient effort);moderate assist (50% patient effort)  -AV     Sit-Supine Cecil (Bed Mobility) moderate assist (50% patient effort)  -AV     Bed Mobility, Safety Issues decreased use of arms for pushing/pulling;decreased use of legs for bridging/pushing  -AV     Assistive Device (Bed Mobility) head of bed elevated;overhead trapeze;bed rails  -AV       Row Name 09/14/23 1500          Transfers    Comment, (Transfers) Sit to stand attempted x4. Patient able to bring hips off edge of bed but is unable to bring trunk upright. Bed elevated to assist with transfer.  -AV       Row Name 09/14/23 1500          Balance    Balance Assessment standing static balance  -AV     Static Standing Balance maximum assist  -AV     Position/Device Used, Standing Balance supported;walker, front-wheeled  -AV       Row Name             Wound 09/10/23 2016 Right anterior other (see comments) Pressure Injury    Wound - Properties Group Placement Date: 09/10/23  -KE Placement Time: 2016  -KE Present on Hospital Admission: Y  -KE Side: Right  -KE Orientation: anterior  -KE Location: other (see comments)  -KE Primary Wound Type: Pressure inj  -KE, to tip of amputated stump.     Retired Wound - Properties Group Placement Date: 09/10/23  -KE Placement Time: 2016  -KE Present on Hospital Admission: Y  -KE Side: Right  -KE Orientation: anterior  -KE Location: other (see comments)  -KE Primary Wound Type: Pressure inj  -KE, to tip of amputated stump.     Retired Wound - Properties Group Date first assessed: 09/10/23  -KE Time first assessed: 2016  -KE Present on Hospital Admission: Y  -KE Side: Right  -KE Location: other (see comments)  -KE Primary Wound Type: Pressure inj  -KE, to tip of amputated stump.       Row Name             Wound 09/10/23 2675 Left proximal arm Skin Tear    Wound - Properties Group Placement Date:  09/10/23  -BL Placement Time: 2345  -BL Present on Hospital Admission: Y  -BL Side: Left  -BL Orientation: proximal  -BL Location: arm  -BL Primary Wound Type: Skin tear  -BL, old healing skin tear     Retired Wound - Properties Group Placement Date: 09/10/23  -BL Placement Time: 2345  -BL Present on Hospital Admission: Y  -BL Side: Left  -BL Orientation: proximal  -BL Location: arm  -BL Primary Wound Type: Skin tear  -BL, old healing skin tear     Retired Wound - Properties Group Date first assessed: 09/10/23  -BL Time first assessed: 2345  -BL Present on Hospital Admission: Y  -BL Side: Left  -BL Location: arm  -BL Primary Wound Type: Skin tear  -BL, old healing skin tear       Row Name             Wound 09/10/23 2345 Left posterior foot Diabetic Ulcer    Wound - Properties Group Placement Date: 09/10/23  -BL Placement Time: 2345  -BL Side: Left  -BL Orientation: posterior  -BL Location: foot  -BL Primary Wound Type: Diabetic ulc  -BL    Retired Wound - Properties Group Placement Date: 09/10/23  -BL Placement Time: 2345  -BL Side: Left  -BL Orientation: posterior  -BL Location: foot  -BL Primary Wound Type: Diabetic ulc  -BL    Retired Wound - Properties Group Date first assessed: 09/10/23  -BL Time first assessed: 2345  -BL Side: Left  -BL Location: foot  -BL Primary Wound Type: Diabetic ulc  -BL      Row Name             Wound 09/10/23 2345 Left distal calf Diabetic Ulcer    Wound - Properties Group Placement Date: 09/10/23  -BL Placement Time: 2345  -BL Side: Left  -BL Orientation: distal  -BL Location: calf  -BL Primary Wound Type: Diabetic ulc  -BL    Retired Wound - Properties Group Placement Date: 09/10/23  -BL Placement Time: 2345  -BL Side: Left  -BL Orientation: distal  -BL Location: calf  -BL Primary Wound Type: Diabetic ulc  -BL    Retired Wound - Properties Group Date first assessed: 09/10/23  -BL Time first assessed: 2345  -BL Side: Left  -BL Location: calf  -BL Primary Wound Type: Diabetic ulc  -BL       Row Name             Wound 09/10/23 2345 Right distal calf MASD (Moisture associated skin damage)    Wound - Properties Group Placement Date: 09/10/23  -BL Placement Time: 2345  -BL Side: Right  -BL Orientation: distal  -BL Location: calf  -BL Primary Wound Type: MASD  -BL    Retired Wound - Properties Group Placement Date: 09/10/23  -BL Placement Time: 2345  -BL Side: Right  -BL Orientation: distal  -BL Location: calf  -BL Primary Wound Type: MASD  -BL    Retired Wound - Properties Group Date first assessed: 09/10/23  -BL Time first assessed: 2345  -BL Side: Right  -BL Location: calf  -BL Primary Wound Type: MASD  -BL      Row Name             Wound 09/10/23 2345 Right anterior hip MASD (Moisture associated skin damage)    Wound - Properties Group Placement Date: 09/10/23  -BL Placement Time: 2345  -BL Present on Hospital Admission: Y  -BL Side: Right  -BL Orientation: anterior  -BL Location: hip  -BL Primary Wound Type: MASD  -BL    Retired Wound - Properties Group Placement Date: 09/10/23  -BL Placement Time: 2345  -BL Present on Hospital Admission: Y  -BL Side: Right  -BL Orientation: anterior  -BL Location: hip  -BL Primary Wound Type: MASD  -BL    Retired Wound - Properties Group Date first assessed: 09/10/23  -BL Time first assessed: 2345  -BL Present on Hospital Admission: Y  -BL Side: Right  -BL Location: hip  -BL Primary Wound Type: MASD  -BL      Row Name             Wound 09/10/23 2345 Left anterior hip MASD (Moisture associated skin damage)    Wound - Properties Group Placement Date: 09/10/23  -BL Placement Time: 2345  -BL Present on Hospital Admission: Y  -BL Side: Left  -BL Orientation: anterior  -BL Location: hip  -BL Primary Wound Type: MASD  -BL    Retired Wound - Properties Group Placement Date: 09/10/23  -BL Placement Time: 2345  -BL Present on Hospital Admission: Y  -BL Side: Left  -BL Orientation: anterior  -BL Location: hip  -BL Primary Wound Type: MASD  -BL    Retired Wound - Properties  Group Date first assessed: 09/10/23  -BL Time first assessed: 2345  -BL Present on Hospital Admission: Y  -BL Side: Left  -BL Location: hip  -BL Primary Wound Type: MASD  -BL      Row Name             Wound 06/22/21 1133 Left lateral heel    Wound - Properties Group Placement Date: 06/22/21  -FABIOLA Placement Time: 1133  -FABIOLA Present on Hospital Admission: Y  -FABIOLA Side: Left  -FABIOLA Orientation: lateral  -FABIOLA Location: heel  -FABIOLA    Retired Wound - Properties Group Placement Date: 06/22/21  -FABIOLA Placement Time: 1133  -FABIOLA Present on Hospital Admission: Y  -FABIOLA Side: Left  -FABIOLA Orientation: lateral  -FABIOLA Location: heel  -FABIOLA    Retired Wound - Properties Group Date first assessed: 06/22/21  -FABIOLA Time first assessed: 1133  -FABIOLA Present on Hospital Admission: Y  -FABIOLA Side: Left  -FABIOLA Location: heel  -FABIOLA      Row Name             Wound 12/02/21 Right anterior foot Incision    Wound - Properties Group Placement Date: 12/02/21  -ES Side: Right  -ES Orientation: anterior  -ES Location: foot  -ES Primary Wound Type: Incision  -ES    Retired Wound - Properties Group Placement Date: 12/02/21  -ES Side: Right  -ES Orientation: anterior  -ES Location: foot  -ES Primary Wound Type: Incision  -ES    Retired Wound - Properties Group Date first assessed: 12/02/21  -ES Side: Right  -ES Location: foot  -ES Primary Wound Type: Incision  -ES      Row Name 09/14/23 1500          Progress Summary (PT)    Progress Toward Functional Goals (PT) progress toward functional goals is fair  -AV               User Key  (r) = Recorded By, (t) = Taken By, (c) = Cosigned By      Initials Name Provider Type    Sarah Bernstein, RN Registered Nurse    Marlen Vauhgn, RN Registered Nurse    Frankie Brunson, PT Physical Therapist    ES Katlyn Escobar, RN Registered Nurse    Ciro Frey, RN Registered Nurse                    Physical Therapy Education       Title: PT OT SLP Therapies (Done)       Topic: Physical Therapy (Done)       Point: Mobility  training (Done)       Learning Progress Summary             Patient Acceptance, E, VU by  at 9/13/2023 1059    Acceptance, E,TB, VU by  at 9/12/2023 1308                         Point: Home exercise program (Done)       Learning Progress Summary             Patient Acceptance, E, VU by AV at 9/13/2023 1059    Acceptance, E,TB, VU by  at 9/12/2023 1308                         Point: Body mechanics (Done)       Learning Progress Summary             Patient Acceptance, E, VU by  at 9/13/2023 1059    Acceptance, E,TB, VU by  at 9/12/2023 1308                         Point: Precautions (Done)       Learning Progress Summary             Patient Acceptance, E, VU by  at 9/13/2023 1059    Acceptance, E,TB, VU by  at 9/12/2023 1308                                         User Key       Initials Effective Dates Name Provider Type Discipline     06/16/21 -  Uvaldo Christine, OT Occupational Therapist OT     08/16/23 -  Gamal Simmons, MER[employer] Student PT Student PT                  PT Recommendation and Plan     Progress Summary (PT)  Progress Toward Functional Goals (PT): progress toward functional goals is fair   Outcome Measures       Row Name 09/14/23 1500 09/12/23 1300          How much help from another person do you currently need...    Turning from your back to your side while in flat bed without using bedrails? 2  -AV 2  -DP (r) RH (t) DP (c)     Moving from lying on back to sitting on the side of a flat bed without bedrails? 2  -AV 2  -DP (r) RH (t) DP (c)     Moving to and from a bed to a chair (including a wheelchair)? 1  -AV 1  -DP (r) RH (t) DP (c)     Standing up from a chair using your arms (e.g., wheelchair, bedside chair)? 2  -AV 1  -DP (r) RH (t) DP (c)     Climbing 3-5 steps with a railing? 1  -AV 1  -DP (r) RH (t) DP (c)     To walk in hospital room? 1  -AV 1  -DP (r) RH (t) DP (c)     AM-PAC 6 Clicks Score (PT) 9  -AV 8  -DP (r) RH (t)        Functional Assessment    Outcome Measure  Options AM-PAC 6 Clicks Basic Mobility (PT)  -AV --               User Key  (r) = Recorded By, (t) = Taken By, (c) = Cosigned By      Initials Name Provider Type    Deedee Dove, PT Physical Therapist    AV Frankie Ospina, PT Physical Therapist    Gamal Calero, PT Student PT Student                     Time Calculation:    PT Charges       Row Name 09/14/23 1522             Time Calculation    PT Received On 09/14/23  -AV         Timed Charges    90910 - PT Therapeutic Activity Minutes 23  -AV         Total Minutes    Timed Charges Total Minutes 23  -AV       Total Minutes 23  -AV                User Key  (r) = Recorded By, (t) = Taken By, (c) = Cosigned By      Initials Name Provider Type    AV Frankie Ospina, PT Physical Therapist                  Therapy Charges for Today       Code Description Service Date Service Provider Modifiers Qty    [id number]  PT THERAPEUTIC ACT EA 15 MIN 9/14/2023 Frankie Ospina, PT GP 2            PT G-Codes  Outcome Measure Options: AM-PAC 6 Clicks Basic Mobility (PT)  AM-PAC 6 Clicks Score (PT): 9  AM-PAC 6 Clicks Score (OT): 17    Frankie Ospina PT  9/14/2023

## 2023-09-14 NOTE — PLAN OF CARE
"Goal Outcome Evaluation:  Plan of Care Reviewed With: patient        Progress: no change  Outcome Evaluation: pt medicated for c/o pain this shift. pt agitated and refusing to turn regardles of education. pt refused skin care orders this shift states would \"rather get blisters on butt than turn and reposition.\" Wound care provided per orders. pt refused bowel regimen regardless of education on no bm since 9/11/23.         "

## 2023-09-15 LAB
GLUCOSE BLDC GLUCOMTR-MCNC: 209 MG/DL (ref 70–99)
GLUCOSE BLDC GLUCOMTR-MCNC: 234 MG/DL (ref 70–99)
GLUCOSE BLDC GLUCOMTR-MCNC: 250 MG/DL (ref 70–99)
GLUCOSE BLDC GLUCOMTR-MCNC: 252 MG/DL (ref 70–99)
GLUCOSE BLDC GLUCOMTR-MCNC: 265 MG/DL (ref 70–99)

## 2023-09-15 PROCEDURE — 99232 SBSQ HOSP IP/OBS MODERATE 35: CPT | Performed by: INTERNAL MEDICINE

## 2023-09-15 PROCEDURE — 63710000001 INSULIN LISPRO (HUMAN) PER 5 UNITS: Performed by: FAMILY MEDICINE

## 2023-09-15 PROCEDURE — 82948 REAGENT STRIP/BLOOD GLUCOSE: CPT

## 2023-09-15 PROCEDURE — 63710000001 INSULIN DETEMIR PER 5 UNITS: Performed by: INTERNAL MEDICINE

## 2023-09-15 RX ADMIN — BACLOFEN 10 MG: 10 TABLET ORAL at 09:25

## 2023-09-15 RX ADMIN — INSULIN LISPRO 6 UNITS: 100 INJECTION, SOLUTION INTRAVENOUS; SUBCUTANEOUS at 17:33

## 2023-09-15 RX ADMIN — HYDROCODONE BITARTRATE AND ACETAMINOPHEN 1 TABLET: 7.5; 325 TABLET ORAL at 09:25

## 2023-09-15 RX ADMIN — INSULIN LISPRO 4 UNITS: 100 INJECTION, SOLUTION INTRAVENOUS; SUBCUTANEOUS at 08:39

## 2023-09-15 RX ADMIN — SENNOSIDES AND DOCUSATE SODIUM 2 TABLET: 50; 8.6 TABLET ORAL at 21:36

## 2023-09-15 RX ADMIN — INSULIN DETEMIR 23 UNITS: 100 INJECTION, SOLUTION SUBCUTANEOUS at 21:35

## 2023-09-15 RX ADMIN — APIXABAN 5 MG: 5 TABLET, FILM COATED ORAL at 08:40

## 2023-09-15 RX ADMIN — INSULIN LISPRO 4 UNITS: 100 INJECTION, SOLUTION INTRAVENOUS; SUBCUTANEOUS at 13:04

## 2023-09-15 RX ADMIN — HYDROCODONE BITARTRATE AND ACETAMINOPHEN 1 TABLET: 7.5; 325 TABLET ORAL at 01:42

## 2023-09-15 RX ADMIN — INSULIN LISPRO 6 UNITS: 100 INJECTION, SOLUTION INTRAVENOUS; SUBCUTANEOUS at 21:35

## 2023-09-15 RX ADMIN — ATORVASTATIN CALCIUM 10 MG: 10 TABLET, FILM COATED ORAL at 21:43

## 2023-09-15 RX ADMIN — Medication 10 ML: at 08:42

## 2023-09-15 RX ADMIN — HYDROCODONE BITARTRATE AND ACETAMINOPHEN 1 TABLET: 7.5; 325 TABLET ORAL at 14:20

## 2023-09-15 RX ADMIN — BACLOFEN 10 MG: 10 TABLET ORAL at 21:43

## 2023-09-15 RX ADMIN — HYDROCODONE BITARTRATE AND ACETAMINOPHEN 1 TABLET: 7.5; 325 TABLET ORAL at 21:36

## 2023-09-15 RX ADMIN — PREGABALIN 25 MG: 25 CAPSULE ORAL at 17:33

## 2023-09-15 RX ADMIN — PREGABALIN 25 MG: 25 CAPSULE ORAL at 08:39

## 2023-09-15 RX ADMIN — SENNOSIDES AND DOCUSATE SODIUM 2 TABLET: 50; 8.6 TABLET ORAL at 13:03

## 2023-09-15 RX ADMIN — Medication 10 ML: at 21:37

## 2023-09-15 RX ADMIN — INSULIN DETEMIR 23 UNITS: 100 INJECTION, SOLUTION SUBCUTANEOUS at 08:39

## 2023-09-15 RX ADMIN — APIXABAN 5 MG: 5 TABLET, FILM COATED ORAL at 21:36

## 2023-09-15 RX ADMIN — Medication: at 09:26

## 2023-09-15 RX ADMIN — FAMOTIDINE 40 MG: 20 TABLET, FILM COATED ORAL at 08:39

## 2023-09-15 RX ADMIN — PREGABALIN 25 MG: 25 CAPSULE ORAL at 21:36

## 2023-09-15 NOTE — PROGRESS NOTES
[facility]   Hospitalist Progress Note  Date: 9/15/2023  Patient Name: Jose Shaikh  : 1960  MRN: [id number]  Date of admission: 9/10/2023  Room/Bed: [facility]/      Subjective   Subjective     Chief Complaint: Weakness    Summary:Jose Shaikh is a 62 y.o. male with multiple medical problems just discharged from [facility] the day prior to admission.  He stated that he could barely move or get out of his car so EMS was called.  He said that due to nonweightbearing restrictions he was unable to work on his lower extremity weakness at rehab and was unable to care for himself upon discharge.    Interval Followup: Denies new complaints.  He was denied for [facility]    Review of Systems    All systems reviewed and negative except for what is outlined above.      Objective   Objective     Vitals:   Temp:  [97.3 °F (36.3 °C)-98.2 °F (36.8 °C)] 97.3 °F (36.3 °C)  Heart Rate:  [83-96] 96  Resp:  [16-18] 18  BP: ()/(54-76) 135/76    Physical Exam   General: Awake, alert, NAD  HENT: NCAT, MMM  Eyes: pupils equal, no scleral icterus  Cardiovascular: RRR, no murmurs   Pulmonary: CTA bilaterally; no wheezes; no conversational dyspnea  Gastrointestinal: S/ND/NT, +BS  Musculoskeletal: s/p partial amputation right foot  Skin: No jaundice, no rash on exposed skin appreciated  Neuro: CN II through XII grossly intact; speech clear; no tremor  Psych: Mood and affect appropriate      Result Review    Result Review:  I have personally reviewed these results:  [x]  Laboratory      Lab 23  0643 23  0531 23  0623   WBC 4.05 3.97 3.44   HEMOGLOBIN 11.9* 11.9* 11.9*   HEMATOCRIT 38.7 38.2 36.8*   PLATELETS 128* 120* 106*   NEUTROS ABS 2.54 1.99 1.84   IMMATURE GRANS (ABS) 0.01 0.01 0.01   LYMPHS ABS 0.96 1.23 1.00   MONOS ABS 0.41 0.55 0.46   EOS ABS 0.10 0.15 0.11   MCV 81.1 81.6 79.7           Lab 23  0531 23  0623 23  0523 09/10/23  1802   SODIUM 139 138 140 121*   POTASSIUM 4.5  3.7 3.7 4.1   CHLORIDE 106 104 104 87*   CO2 26.5 24.3 26.3 22.6   ANION GAP 6.5 9.7 9.7 11.4   BUN 8 8 8 8   CREATININE 0.69* 0.52* 0.45* 0.58*   EGFR 103.3 112.6 117.6 108.9   GLUCOSE 202* 215* 178* 170*   CALCIUM 8.5* 8.6 8.6 9.6   MAGNESIUM  --   --   --  1.6   PHOSPHORUS  --  3.9  --   --            Lab 09/12/23  0623 09/10/23  1802   TOTAL PROTEIN  --  7.0   ALBUMIN 2.9* 3.5   GLOBULIN  --  3.5   ALT (SGPT)  --  30   AST (SGOT)  --  40   BILIRUBIN  --  1.2   ALK PHOS  --  178*                   Lab 09/14/23  0643   IRON 58*   IRON SATURATION (TSAT) 17*   TIBC 340   TRANSFERRIN 228   FOLATE 7.26   VITAMIN B 12 466           Brief Urine Lab Results  (Last result in the past 365 days)        Color   Clarity   Blood   Leuk Est   Nitrite   Protein   CREAT   Urine HCG        09/11/23 2200 Dark Yellow   Clear   Negative   Negative   Negative   Negative                 [x]  Microbiology   Microbiology Results (last 10 days)       ** No results found for the last 240 hours. **          [x]  Radiology  No radiology results for the last 7 days  []  EKG/Telemetry   []  Cardiology/Vascular   []  Pathology  []  Old records  []  Other:    Assessment & Plan   Assessment / Plan     Assessment:  Generalized weakness  Diabetes  Low sodium likely a lab error given drastic change this morning  Hypertension  History of atrial fibrillation  Morbid obesity  Debility with wheelchair dependent  Chronic wound    Plan:  Patient placed back in the hospital  PT/OT  Continue current medications  It appears that discharge placement will be a challenge    Discussed with RN.    DVT prophylaxis:  Medical DVT prophylaxis orders are present.    CODE STATUS:   Electronically signed by Jose Maya MD, 09/15/23, 6:46 PM EDT.

## 2023-09-15 NOTE — PLAN OF CARE
"Goal Outcome Evaluation:              Outcome Evaluation: patient is alert and oriented x4 and on room air. prn paid medication given x2 during shift due to pain in his lower extremtities. patient is agitated and complains about frequent glucose checks. patient refuses his bowel regimen as well as q2 turns. pt states that he \"doesnt care\" about the wounds on his gluteals. No new issues at this time.          "

## 2023-09-15 NOTE — SIGNIFICANT NOTE
Wound Eval / Progress Noted    KEO Dominguez     Patient Name: Jose Shaikh  : 1960  MRN: [id number]  Today's Date: 9/15/2023                 Admit Date: 9/10/2023    Visit Dx:    ICD-10-CM ICD-9-CM   1. Generalized weakness  R53.1 780.79   2. Hyponatremia  E87.1 276.1   3. Difficulty in walking  R26.2 719.7   4. Decreased activities of daily living (ADL)  Z78.9 V49.89         Generalized weakness    Diabetic ulcer of left heel associated with type 2 DM    Paroxysmal atrial fibrillation    Essential hypertension    Foot pain, bilateral    Type 2 diabetes mellitus, with long-term current use of insulin    Class 3 severe obesity due to excess calories with serious comorbidity and body mass index (BMI) of 45.0 to 49.9 in adult    Dependence on wheelchair        Past Medical History:   Diagnosis Date    Absence of toe of right foot     Acute osteomyelitis of left calcaneus  2021    Anxiety and depression     Arthritis     Claustrophobia     Corns and callus     Diabetic ulcer of left heel associated with type 2 DM 2021    Diabetic ulcer of left heel associated with type 2 DM 2021    Diabetic ulcer of right midfoot associated with type 2 DM 2021    Difficulty walking     Essential hypertension 2021    Hammertoe     Hyperlipidemia LDL goal <100 2021    Ingrown toenail     Obesity     Paroxysmal atrial fibrillation 2021    Polyneuropathy     Pressure ulcer, stage 1     Tinea unguium     Type 2 diabetes mellitus with polyneuropathy         Past Surgical History:   Procedure Laterality Date    CYST REMOVAL      center of back; benign    INCISION AND DRAINAGE ABSCESS      back    INCISION AND DRAINAGE LEG Right 12/10/2021    Procedure: INCISION AND DRAINAGE LOWER EXTREMITY;  Surgeon: Ash Leyva DPM;  Location: [facility] MAIN OR;  Service: Podiatry;  Laterality: Right;    OTHER SURGICAL HISTORY      Surgical clips left foot    TOE SURGERY Right     Removal of 5th toe    TRANS  METATARSAL AMPUTATION Right 12/2/2021    Procedure: AMPUTATION TRANS METATARSAL;  Surgeon: Ash Leyva DPM;  Location: [facility] MAIN OR;  Service: Podiatry;  Laterality: Right;    WRIST SURGERY Left     repair of injury         Physical Assessment:  Wound 12/02/21 Right anterior foot Incision (Active)   Wound Image   09/15/23 1337   Dressing Appearance dry;intact 09/15/23 1337   Closure None 09/15/23 1337   Base maroon/purple;scab;red;moist 09/15/23 1337   Periwound dry;intact 09/15/23 1337   Periwound Temperature warm 09/15/23 1337   Periwound Skin Turgor soft 09/15/23 1337   Edges open 09/15/23 1337   Drainage Characteristics/Odor serosanguineous 09/15/23 1337   Drainage Amount scant 09/15/23 1337   Care, Wound cleansed with;sterile normal saline 09/15/23 1337   Dressing Care dressing applied;hydrofiber;silver impregnated;gauze, dry 09/15/23 1337   Periwound Care absorptive dressing applied 09/15/23 1337       Wound 09/10/23 2345 Right anterior hip MASD (Moisture associated skin damage) (Active)   Dressing Appearance open to air 09/15/23 1337   Closure None 09/15/23 1337   Base moist;pink 09/15/23 1337   Periwound moist;pink;intact 09/15/23 1337   Periwound Temperature warm 09/15/23 1337   Edges open;rolled/closed 09/15/23 1337   Drainage Amount none 09/15/23 1337       Wound 09/15/23 Right gluteal (Active)   Wound Image   09/15/23 1337   Dressing Appearance open to air 09/15/23 1337   Closure None 09/15/23 1337   Base moist;red;blanchable 09/15/23 1337   Periwound dry;intact;blistered 09/15/23 1337   Periwound Temperature warm 09/15/23 1337   Periwound Skin Turgor soft 09/15/23 1337   Edges open 09/15/23 1337   Drainage Characteristics/Odor serosanguineous 09/15/23 1337   Drainage Amount scant 09/15/23 1337   Care, Wound cleansed with;sterile normal saline 09/15/23 1337   Dressing Care dressing applied;petroleum-based;non-adherent;gauze;silicone;border dressing 09/15/23 1336   Periwound Care absorptive  dressing applied 09/15/23 0822      Wound Check / Follow-up:  Patient seen today for wound follow-up and dressing change. Patient remains with chronic wound to right anterior/distal foot. Small open area remains with moist red tissue and maroon/purple tissue to periwound. Cleansed with normal saline and gauze. Applied silver impregnated hydrofiber then dry gauze and secured with gauze roll. Recommending to continue current care. Dry skin remains to BLE. Recommending to continue current skin care/topical treatment. Abdominal and groin folds/creases remain with moisture and intermittent superficial tissue loss. Recommending to continue skin care and application of cornstarch powder followed by moisture wicking fabric. Patient with new open area to right gluteal aspect. Moist, red, blanchable wound base present with blistering noted to periwound tissue. Cleansed with normal saline and gauze. Recommending daily dressing changes with non-adherent petroleum based gauze and silicone border dressing. Recommending to continue every two hour turns, offload heels, and keep patient free from moisture/moisture managed.      Impression: Chronic wound to right foot. Dry skin. MASD. Friction/shearing.      Short term goals: Regain skin integrity, skin protection, pressure reduction, moisture prevention/management, daily dressing changes, topical treatment, skin care.       Dottie Guevara RN    9/15/2023    15:16 EDT

## 2023-09-16 LAB
GLUCOSE BLDC GLUCOMTR-MCNC: 243 MG/DL (ref 70–99)
GLUCOSE BLDC GLUCOMTR-MCNC: 247 MG/DL (ref 70–99)
GLUCOSE BLDC GLUCOMTR-MCNC: 270 MG/DL (ref 70–99)
GLUCOSE BLDC GLUCOMTR-MCNC: 296 MG/DL (ref 70–99)

## 2023-09-16 PROCEDURE — 63710000001 INSULIN LISPRO (HUMAN) PER 5 UNITS: Performed by: FAMILY MEDICINE

## 2023-09-16 PROCEDURE — 82948 REAGENT STRIP/BLOOD GLUCOSE: CPT

## 2023-09-16 PROCEDURE — 63710000001 INSULIN DETEMIR PER 5 UNITS: Performed by: INTERNAL MEDICINE

## 2023-09-16 PROCEDURE — 99232 SBSQ HOSP IP/OBS MODERATE 35: CPT | Performed by: INTERNAL MEDICINE

## 2023-09-16 RX ORDER — PREGABALIN 25 MG/1
50 CAPSULE ORAL 3 TIMES DAILY
Status: DISCONTINUED | OUTPATIENT
Start: 2023-09-16 | End: 2023-10-13

## 2023-09-16 RX ORDER — SIMETHICONE 80 MG
80 TABLET,CHEWABLE ORAL 4 TIMES DAILY PRN
Status: DISCONTINUED | OUTPATIENT
Start: 2023-09-16 | End: 2023-11-06 | Stop reason: HOSPADM

## 2023-09-16 RX ADMIN — INSULIN LISPRO 4 UNITS: 100 INJECTION, SOLUTION INTRAVENOUS; SUBCUTANEOUS at 12:44

## 2023-09-16 RX ADMIN — INSULIN DETEMIR 23 UNITS: 100 INJECTION, SOLUTION SUBCUTANEOUS at 20:10

## 2023-09-16 RX ADMIN — INSULIN DETEMIR 23 UNITS: 100 INJECTION, SOLUTION SUBCUTANEOUS at 09:27

## 2023-09-16 RX ADMIN — PREGABALIN 50 MG: 25 CAPSULE ORAL at 20:10

## 2023-09-16 RX ADMIN — BISACODYL 5 MG: 5 TABLET, COATED ORAL at 09:28

## 2023-09-16 RX ADMIN — SIMETHICONE 80 MG: 80 TABLET, CHEWABLE ORAL at 17:56

## 2023-09-16 RX ADMIN — BACLOFEN 10 MG: 10 TABLET ORAL at 12:44

## 2023-09-16 RX ADMIN — PREGABALIN 25 MG: 25 CAPSULE ORAL at 09:28

## 2023-09-16 RX ADMIN — HYDROCODONE BITARTRATE AND ACETAMINOPHEN 1 TABLET: 7.5; 325 TABLET ORAL at 22:45

## 2023-09-16 RX ADMIN — Medication: at 12:44

## 2023-09-16 RX ADMIN — INSULIN LISPRO 4 UNITS: 100 INJECTION, SOLUTION INTRAVENOUS; SUBCUTANEOUS at 17:57

## 2023-09-16 RX ADMIN — APIXABAN 5 MG: 5 TABLET, FILM COATED ORAL at 20:10

## 2023-09-16 RX ADMIN — INSULIN LISPRO 6 UNITS: 100 INJECTION, SOLUTION INTRAVENOUS; SUBCUTANEOUS at 09:27

## 2023-09-16 RX ADMIN — Medication 10 ML: at 20:10

## 2023-09-16 RX ADMIN — FAMOTIDINE 40 MG: 20 TABLET, FILM COATED ORAL at 09:28

## 2023-09-16 RX ADMIN — ATORVASTATIN CALCIUM 10 MG: 10 TABLET, FILM COATED ORAL at 20:10

## 2023-09-16 RX ADMIN — APIXABAN 5 MG: 5 TABLET, FILM COATED ORAL at 09:28

## 2023-09-16 RX ADMIN — HYDROCODONE BITARTRATE AND ACETAMINOPHEN 1 TABLET: 7.5; 325 TABLET ORAL at 07:23

## 2023-09-16 RX ADMIN — PREGABALIN 50 MG: 25 CAPSULE ORAL at 17:56

## 2023-09-16 RX ADMIN — HYDROCODONE BITARTRATE AND ACETAMINOPHEN 1 TABLET: 7.5; 325 TABLET ORAL at 17:57

## 2023-09-16 RX ADMIN — INSULIN LISPRO 6 UNITS: 100 INJECTION, SOLUTION INTRAVENOUS; SUBCUTANEOUS at 20:10

## 2023-09-16 RX ADMIN — Medication 10 ML: at 09:28

## 2023-09-16 NOTE — PLAN OF CARE
Goal Outcome Evaluation:           Progress: improving  Outcome Evaluation: pt is alert and oriented x4 and on room air. skin care provided per orders. pt is more accepting to q2 turns and wound/skin care today. wound dressing changed on right gluteal. patient was medicated for muscle spasms and pain. no new issues or concerns at this time.

## 2023-09-16 NOTE — PROGRESS NOTES
[facility]   Hospitalist Progress Note  Date: 2023  Patient Name: Jose Shaikh  : 1960  MRN: [id number]  Date of admission: 9/10/2023  Room/Bed: [facility]/      Subjective   Subjective     Chief Complaint: Weakness    Summary:Jose Shaikh is a 62 y.o. male with multiple medical problems just discharged from [facility] the day prior to admission.  He stated that he could barely move or get out of his car so EMS was called.  He said that due to nonweightbearing restrictions he was unable to work on his lower extremity weakness at rehab and was unable to care for himself upon discharge.    Interval Followup: Denies new complaints.  He was denied for [facility]    Review of Systems    All systems reviewed and negative except for what is outlined above.      Objective   Objective     Vitals:   Temp:  [97.3 °F (36.3 °C)-98.3 °F (36.8 °C)] 98.3 °F (36.8 °C)  Heart Rate:  [] 94  Resp:  [16-20] 20  BP: (131-148)/(63-98) 131/98    Physical Exam   General: Awake, alert, NAD  HENT: NCAT, MMM  Eyes: pupils equal, no scleral icterus  Cardiovascular: RRR, no murmurs   Pulmonary: CTA bilaterally; no wheezes; no conversational dyspnea  Gastrointestinal: S/ND/NT, +BS  Musculoskeletal: s/p partial amputation right foot  Skin: No jaundice, no rash on exposed skin appreciated  Neuro: CN II through XII grossly intact; speech clear; no tremor  Psych: Mood and affect appropriate      Result Review    Result Review:  I have personally reviewed these results:  [x]  Laboratory      Lab 23  0643 23  0531 23  0623   WBC 4.05 3.97 3.44   HEMOGLOBIN 11.9* 11.9* 11.9*   HEMATOCRIT 38.7 38.2 36.8*   PLATELETS 128* 120* 106*   NEUTROS ABS 2.54 1.99 1.84   IMMATURE GRANS (ABS) 0.01 0.01 0.01   LYMPHS ABS 0.96 1.23 1.00   MONOS ABS 0.41 0.55 0.46   EOS ABS 0.10 0.15 0.11   MCV 81.1 81.6 79.7           Lab 23  0531 23  0623 23  0523 09/10/23  1802   SODIUM 139 138 140 121*   POTASSIUM 4.5  3.7 3.7 4.1   CHLORIDE 106 104 104 87*   CO2 26.5 24.3 26.3 22.6   ANION GAP 6.5 9.7 9.7 11.4   BUN 8 8 8 8   CREATININE 0.69* 0.52* 0.45* 0.58*   EGFR 103.3 112.6 117.6 108.9   GLUCOSE 202* 215* 178* 170*   CALCIUM 8.5* 8.6 8.6 9.6   MAGNESIUM  --   --   --  1.6   PHOSPHORUS  --  3.9  --   --            Lab 09/12/23  0623 09/10/23  1802   TOTAL PROTEIN  --  7.0   ALBUMIN 2.9* 3.5   GLOBULIN  --  3.5   ALT (SGPT)  --  30   AST (SGOT)  --  40   BILIRUBIN  --  1.2   ALK PHOS  --  178*                   Lab 09/14/23  0643   IRON 58*   IRON SATURATION (TSAT) 17*   TIBC 340   TRANSFERRIN 228   FOLATE 7.26   VITAMIN B 12 466           Brief Urine Lab Results  (Last result in the past 365 days)        Color   Clarity   Blood   Leuk Est   Nitrite   Protein   CREAT   Urine HCG        09/11/23 2200 Dark Yellow   Clear   Negative   Negative   Negative   Negative                 [x]  Microbiology   Microbiology Results (last 10 days)       ** No results found for the last 240 hours. **          [x]  Radiology  No radiology results for the last 7 days  []  EKG/Telemetry   []  Cardiology/Vascular   []  Pathology  []  Old records  []  Other:    Assessment & Plan   Assessment / Plan     Assessment:  Generalized weakness  Diabetes  Low sodium likely a lab error given drastic change this morning  Hypertension  History of atrial fibrillation  Morbid obesity  Debility with wheelchair dependent  Chronic wound    Plan:  Patient placed back in the hospital  PT/OT  Continue current medications  Can add Elavil for some sciatic pain  It appears that discharge placement will be a challenge    Discussed with RN.    DVT prophylaxis:  Medical DVT prophylaxis orders are present.    CODE STATUS:     Electronically signed by Jose Maya MD, 09/16/23, 1:17 PM EDT.

## 2023-09-16 NOTE — PLAN OF CARE
Goal Outcome Evaluation:  Plan of Care Reviewed With: patient        Progress: no change  Outcome Evaluation: pt medicated for c/o pain this shift. skin care provided per orders. pt willing to do frequent turns and repositions this shift. Wound care provided by wound care nurse. no new issues at this time.

## 2023-09-16 NOTE — PLAN OF CARE
Goal Outcome Evaluation:              Outcome Evaluation: aox4. intermittently refusing turns and care, educated on importance of turning and repositioning to prevent further skin breakdown. wound and skin care provided per orders. medicated for c/o pain and gas discomfort per mar. blood glucose monitored.

## 2023-09-17 LAB
GLUCOSE BLDC GLUCOMTR-MCNC: 241 MG/DL (ref 70–99)
GLUCOSE BLDC GLUCOMTR-MCNC: 262 MG/DL (ref 70–99)
GLUCOSE BLDC GLUCOMTR-MCNC: 265 MG/DL (ref 70–99)
GLUCOSE BLDC GLUCOMTR-MCNC: 305 MG/DL (ref 70–99)

## 2023-09-17 PROCEDURE — 63710000001 INSULIN LISPRO (HUMAN) PER 5 UNITS: Performed by: FAMILY MEDICINE

## 2023-09-17 PROCEDURE — 99232 SBSQ HOSP IP/OBS MODERATE 35: CPT | Performed by: INTERNAL MEDICINE

## 2023-09-17 PROCEDURE — 82948 REAGENT STRIP/BLOOD GLUCOSE: CPT

## 2023-09-17 PROCEDURE — 97110 THERAPEUTIC EXERCISES: CPT

## 2023-09-17 PROCEDURE — 63710000001 INSULIN DETEMIR PER 5 UNITS: Performed by: INTERNAL MEDICINE

## 2023-09-17 RX ADMIN — INSULIN LISPRO 7 UNITS: 100 INJECTION, SOLUTION INTRAVENOUS; SUBCUTANEOUS at 12:11

## 2023-09-17 RX ADMIN — BACLOFEN 10 MG: 10 TABLET ORAL at 00:56

## 2023-09-17 RX ADMIN — MICONAZOLE NITRATE 1 APPLICATION: 2 POWDER TOPICAL at 00:11

## 2023-09-17 RX ADMIN — INSULIN LISPRO 4 UNITS: 100 INJECTION, SOLUTION INTRAVENOUS; SUBCUTANEOUS at 17:19

## 2023-09-17 RX ADMIN — PREGABALIN 50 MG: 25 CAPSULE ORAL at 17:19

## 2023-09-17 RX ADMIN — HYDROCODONE BITARTRATE AND ACETAMINOPHEN 1 TABLET: 7.5; 325 TABLET ORAL at 08:17

## 2023-09-17 RX ADMIN — INSULIN LISPRO 6 UNITS: 100 INJECTION, SOLUTION INTRAVENOUS; SUBCUTANEOUS at 20:08

## 2023-09-17 RX ADMIN — APIXABAN 5 MG: 5 TABLET, FILM COATED ORAL at 08:17

## 2023-09-17 RX ADMIN — APIXABAN 5 MG: 5 TABLET, FILM COATED ORAL at 20:08

## 2023-09-17 RX ADMIN — SIMETHICONE 80 MG: 80 TABLET, CHEWABLE ORAL at 00:56

## 2023-09-17 RX ADMIN — Medication 10 ML: at 08:17

## 2023-09-17 RX ADMIN — ATORVASTATIN CALCIUM 10 MG: 10 TABLET, FILM COATED ORAL at 20:08

## 2023-09-17 RX ADMIN — FAMOTIDINE 40 MG: 20 TABLET, FILM COATED ORAL at 08:17

## 2023-09-17 RX ADMIN — PREGABALIN 50 MG: 25 CAPSULE ORAL at 08:17

## 2023-09-17 RX ADMIN — BACLOFEN 10 MG: 10 TABLET ORAL at 14:46

## 2023-09-17 RX ADMIN — Medication 10 ML: at 20:11

## 2023-09-17 RX ADMIN — HYDROCODONE BITARTRATE AND ACETAMINOPHEN 1 TABLET: 7.5; 325 TABLET ORAL at 14:46

## 2023-09-17 RX ADMIN — MICONAZOLE NITRATE 1 APPLICATION: 2 POWDER TOPICAL at 08:17

## 2023-09-17 RX ADMIN — PREGABALIN 50 MG: 25 CAPSULE ORAL at 20:08

## 2023-09-17 RX ADMIN — INSULIN LISPRO 6 UNITS: 100 INJECTION, SOLUTION INTRAVENOUS; SUBCUTANEOUS at 08:17

## 2023-09-17 RX ADMIN — INSULIN DETEMIR 28 UNITS: 100 INJECTION, SOLUTION SUBCUTANEOUS at 20:08

## 2023-09-17 RX ADMIN — MICONAZOLE NITRATE 1 APPLICATION: 2 POWDER TOPICAL at 20:11

## 2023-09-17 RX ADMIN — INSULIN DETEMIR 23 UNITS: 100 INJECTION, SOLUTION SUBCUTANEOUS at 08:17

## 2023-09-17 RX ADMIN — Medication: at 10:00

## 2023-09-17 NOTE — PROGRESS NOTES
[facility]   Hospitalist Progress Note  Date: 2023  Patient Name: Jose Shaikh  : 1960  MRN: [id number]  Date of admission: 9/10/2023  Room/Bed: [facility]/      Subjective   Subjective     Chief Complaint: Weakness    Summary:Jose Shaikh is a 62 y.o. male with multiple medical problems [facility] to [facility].  He stated that he could barely move or get out of his car so EMS was called.  He said that due to nonweightbearing restrictions he was unable to work on his lower extremity weakness at rehab and was unable to care for himself upon discharge.    Interval Followup: no new issues.    Review of Systems    All systems reviewed and negative except for what is outlined above.      Objective   Objective     Vitals:   Temp:  [97.2 °F (36.2 °C)-98.6 °F (37 °C)] 97.7 °F (36.5 °C)  Heart Rate:  [] 100  Resp:  [18] 18  BP: (124-139)/(66-80) 134/73    Physical Exam   General: Awake, alert, NAD  HENT: NCAT, MMM  Eyes: pupils equal, no scleral icterus  Cardiovascular: RRR, no murmurs   Pulmonary: CTA bilaterally; no wheezes; no conversational dyspnea  Gastrointestinal: S/ND/NT, +BS  Musculoskeletal: s/p partial amputation right foot  Skin: No jaundice, no rash on exposed skin appreciated  Neuro: CN II through XII grossly intact; speech clear; no tremor  Psych: Mood and affect appropriate      Result Review    Result Review:  I have personally reviewed these results:  [x]  Laboratory      Lab 2343 23   WBC 4.05 3.97 3.44   HEMOGLOBIN 11.9* 11.9* 11.9*   HEMATOCRIT 38.7 38.2 36.8*   PLATELETS 128* 120* 106*   NEUTROS ABS 2.54 1.99 1.84   IMMATURE GRANS (ABS) 0.01 0.01 0.01   LYMPHS ABS 0.96 1.23 1.00   MONOS ABS 0.41 0.55 0.46   EOS ABS 0.10 0.15 0.11   MCV 81.1 81.6 79.7           Lab 2331 23/11/23  0523 09/10/23  1802   SODIUM 139 138 140 121*   POTASSIUM 4.5 3.7 3.7 4.1   CHLORIDE 106 104 104 87*    CO2 26.5 24.3 26.3 22.6   ANION GAP 6.5 9.7 9.7 11.4   BUN 8 8 8 8   CREATININE 0.69* 0.52* 0.45* 0.58*   EGFR 103.3 112.6 117.6 108.9   GLUCOSE 202* 215* 178* 170*   CALCIUM 8.5* 8.6 8.6 9.6   MAGNESIUM  --   --   --  1.6   PHOSPHORUS  --  3.9  --   --            Lab 09/12/23  0623 09/10/23  1802   TOTAL PROTEIN  --  7.0   ALBUMIN 2.9* 3.5   GLOBULIN  --  3.5   ALT (SGPT)  --  30   AST (SGOT)  --  40   BILIRUBIN  --  1.2   ALK PHOS  --  178*                   Lab 09/14/23  0643   IRON 58*   IRON SATURATION (TSAT) 17*   TIBC 340   TRANSFERRIN 228   FOLATE 7.26   VITAMIN B 12 466           Brief Urine Lab Results  (Last result in the past 365 days)        Color   Clarity   Blood   Leuk Est   Nitrite   Protein   CREAT   Urine HCG        09/11/23 2200 Dark Yellow   Clear   Negative   Negative   Negative   Negative                 [x]  Microbiology   Microbiology Results (last 10 days)       ** No results found for the last 240 hours. **          [x]  Radiology  No radiology results for the last 7 days  []  EKG/Telemetry   []  Cardiology/Vascular   []  Pathology  []  Old records  []  Other:    Assessment & Plan   Assessment / Plan     Assessment:  Generalized weakness  Diabetes  Low sodium likely a lab error given drastic change this morning  Hypertension  History of atrial fibrillation  Morbid obesity  Debility with wheelchair dependent  Chronic wound    Plan:  Patient placed back in the hospital  PT/OT  Continue current medications  Increased Lyrica for more neuropathic pain control  Awaiting rehab arrangements.    Discussed with RN.    DVT prophylaxis:  Medical DVT prophylaxis orders are present.    CODE STATUS:     Electronically signed by Jose Maya MD, 09/17/23, 1:26 PM EDT.

## 2023-09-17 NOTE — PLAN OF CARE
Goal Outcome Evaluation:  Plan of Care Reviewed With: patient        Progress: improving  Outcome Evaluation: patient is alert and oriented x4 and on room air. patient allowed turns throughout the shift. medicated per mar for pain, gas discomfort and muscle spasms. pt has had small bowel movements throughout the night. blood glucose monitored. wound and skin care provided per orders. no new issues or concerns at this time.

## 2023-09-17 NOTE — PLAN OF CARE
Goal Outcome Evaluation:              Outcome Evaluation: aox4. medicated for c/o pain per mar. patient starting to have larger, more formed BMs. blood glucose monitored. wound and skin care provided per orders.

## 2023-09-17 NOTE — THERAPY TREATMENT NOTE
Acute Care - Physical Therapy Treatment Note  KEO Dominguez     Patient Name: Jose Shaikh  : 1960  MRN: [id number]  Today's Date: 2023      Visit Dx:     ICD-10-CM ICD-9-CM   1. Generalized weakness  R53.1 780.79   2. Hyponatremia  E87.1 276.1   3. Difficulty in walking  R26.2 719.7   4. Decreased activities of daily living (ADL)  Z78.9 V49.89     Patient Active Problem List   Diagnosis    Diabetic ulcer of left heel associated with type 2 DM    Acute osteomyelitis of left calcaneus     Diabetic ulcer of left heel associated with type 2 DM    Diabetic ulcer of right midfoot associated with type 2 DM    Paroxysmal atrial fibrillation    Essential hypertension    Hyperlipidemia LDL goal <100    Cellulitis and abscess of foot    High alkaline phosphatase level    Osteomyelitis    Onychomycosis    Onychocryptosis    Foot pain, bilateral    Osteomyelitis of foot, right, acute    Cellulitis of right foot    Type 2 diabetes mellitus, with long-term current use of insulin    Class 3 severe obesity due to excess calories with serious comorbidity and body mass index (BMI) of 45.0 to 49.9 in adult    Anxiety disorder, unspecified    Claustrophobia    Dependence on wheelchair    Depression, unspecified    Long term (current) use of anticoagulants    Long term (current) use of oral hypoglycemic drugs    Wound of foot    Non-prs chronic ulcer oth prt r foot limited to brkdwn skin    Orthostatic hypotension    Other chronic osteomyelitis, right ankle and foot    Personal history of nicotine dependence    Thrombocytopenia, unspecified    Unspecified open wound, right foot, initial encounter    Diabetic foot infection    Subacute osteomyelitis of right foot    Right foot pain    Sepsis    Onychomycosis    Foot pain, left    Impaired mobility and ADLs    Absence of toe of right foot    Corns and callosity    Disability of walking    Fracture    Limb swelling    Polyneuropathy    Pressure ulcer, stage 1    Shortness of  breath    Generalized weakness     Past Medical History:   Diagnosis Date    Absence of toe of right foot     Acute osteomyelitis of left calcaneus  8/18/2021    Anxiety and depression     Arthritis     Claustrophobia     Corns and callus     Diabetic ulcer of left heel associated with type 2 DM 8/18/2021    Diabetic ulcer of left heel associated with type 2 DM 7/6/2021    Diabetic ulcer of right midfoot associated with type 2 DM 8/18/2021    Difficulty walking     Essential hypertension 8/31/2021    Hammertoe     Hyperlipidemia LDL goal <100 8/31/2021    Ingrown toenail     Obesity     Paroxysmal atrial fibrillation 8/31/2021    Polyneuropathy     Pressure ulcer, stage 1     Tinea unguium     Type 2 diabetes mellitus with polyneuropathy      Past Surgical History:   Procedure Laterality Date    CYST REMOVAL      center of back; benign    INCISION AND DRAINAGE ABSCESS      back    INCISION AND DRAINAGE LEG Right 12/10/2021    Procedure: INCISION AND DRAINAGE LOWER EXTREMITY;  Surgeon: Ash Leyva DPM;  Location: [facility];  Service: Podiatry;  Laterality: Right;    OTHER SURGICAL HISTORY      Surgical clips left foot    TOE SURGERY Right     Removal of 5th toe    TRANS METATARSAL AMPUTATION Right 12/2/2021    Procedure: AMPUTATION TRANS METATARSAL;  Surgeon: Ash Leyva DPM;  Location: [facility];  Service: Podiatry;  Laterality: Right;    WRIST SURGERY Left     repair of injury     PT Assessment (last 12 hours)       PT Evaluation and Treatment       Row Name 09/17/23 1056          Physical Therapy Time and Intention    Subjective Information complains of;weakness;fatigue;pain  -DK     Document Type therapy note (daily note)  -DK     Mode of Treatment individual therapy;physical therapy  -DK     Patient Effort fair  -DK     Symptoms Noted During/After Treatment fatigue;increased pain  -DK     Comment Pt reports having a BM in the bed, requiring cleanup.  -DK       Row Name 09/17/23  1053          Pain    Pretreatment Pain Rating 6/10  -DK     Posttreatment Pain Rating 7/10  -DK     Pain Location - Side/Orientation Left  -DK     Pain Location generalized  -DK     Pain Location - back;hip;knee;foot  -DK     Pain Intervention(s) Distraction;Therapeutic presence  -       Row Name 09/17/23 1053          Cognition    Affect/Mental Status (Cognition) confabulatory  -     Orientation Status (Cognition) oriented x 4  -DK     Follows Commands (Cognition) WFL  -DK     Cognitive Function WFL  -DK     Personal Safety Interventions nonskid shoes/slippers when out of bed;supervised activity;gait belt  -       Row Name 09/17/23 1053          Motor Skills    Motor Skills --  therapeutic exercises  -DK     Coordination WFL  -DK     Therapeutic Exercise hip;knee;ankle  -DK     Additional Documentation --  Pt declined transfers.  -       Row Name 09/17/23 1053          Hip (Therapeutic Exercise)    Hip (Therapeutic Exercise) AAROM (active assistive range of motion)  -     Hip AAROM (Therapeutic Exercise) bilateral;flexion;extension;aBduction;aDduction;supine;10 repetitions;2 sets  -       Row Name 09/17/23 1053          Knee (Therapeutic Exercise)    Knee (Therapeutic Exercise) AAROM (active assistive range of motion)  -     Knee AAROM (Therapeutic Exercise) bilateral;flexion;extension;supine;10 repetitions;2 sets  -       Row Name 09/17/23 1053          Ankle (Therapeutic Exercise)    Ankle (Therapeutic Exercise) AROM (active range of motion)  -     Ankle AROM (Therapeutic Exercise) bilateral;dorsiflexion;plantarflexion;supine;20 repititions  -       Row Name             Wound 09/10/23 2345 Left proximal arm Skin Tear    Wound - Properties Group Placement Date: 09/10/23  -BL Placement Time: 2345 -BL Present on Hospital Admission: Y  -BL Side: Left  -BL Orientation: proximal  -BL Location: arm  -BL Primary Wound Type: Skin tear  -BL, old healing skin tear     Retired Wound - Properties Group  Placement Date: 09/10/23  -BL Placement Time: 2345  -BL Present on Hospital Admission: Y  -BL Side: Left  -BL Orientation: proximal  -BL Location: arm  -BL Primary Wound Type: Skin tear  -BL, old healing skin tear     Retired Wound - Properties Group Date first assessed: 09/10/23  -BL Time first assessed: 2345  -BL Present on Hospital Admission: Y  -BL Side: Left  -BL Location: arm  -BL Primary Wound Type: Skin tear  -BL, old healing skin tear       Row Name             Wound 09/10/23 2345 Left posterior foot Diabetic Ulcer    Wound - Properties Group Placement Date: 09/10/23  -BL Placement Time: 2345  -BL Side: Left  -BL Orientation: posterior  -BL Location: foot  -BL Primary Wound Type: Diabetic ulc  -BL    Retired Wound - Properties Group Placement Date: 09/10/23  -BL Placement Time: 2345  -BL Side: Left  -BL Orientation: posterior  -BL Location: foot  -BL Primary Wound Type: Diabetic ulc  -BL    Retired Wound - Properties Group Date first assessed: 09/10/23  -BL Time first assessed: 2345  -BL Side: Left  -BL Location: foot  -BL Primary Wound Type: Diabetic ulc  -BL      Row Name             Wound 09/10/23 2345 Left distal calf Diabetic Ulcer    Wound - Properties Group Placement Date: 09/10/23  -BL Placement Time: 2345  -BL Side: Left  -BL Orientation: distal  -BL Location: calf  -BL Primary Wound Type: Diabetic ulc  -BL    Retired Wound - Properties Group Placement Date: 09/10/23  -BL Placement Time: 2345  -BL Side: Left  -BL Orientation: distal  -BL Location: calf  -BL Primary Wound Type: Diabetic ulc  -BL    Retired Wound - Properties Group Date first assessed: 09/10/23  -BL Time first assessed: 2345  -BL Side: Left  -BL Location: calf  -BL Primary Wound Type: Diabetic ulc  -BL      Row Name             Wound 09/10/23 2345 Right distal calf MASD (Moisture associated skin damage)    Wound - Properties Group Placement Date: 09/10/23  -BL Placement Time: 2345  -BL Side: Right  -BL Orientation: distal  -BL  Location: calf  -BL Primary Wound Type: MASD  -BL    Retired Wound - Properties Group Placement Date: 09/10/23  -BL Placement Time: 2345  -BL Side: Right  -BL Orientation: distal  -BL Location: calf  -BL Primary Wound Type: MASD  -BL    Retired Wound - Properties Group Date first assessed: 09/10/23  -BL Time first assessed: 2345  -BL Side: Right  -BL Location: calf  -BL Primary Wound Type: MASD  -BL      Row Name             Wound 09/10/23 2345 Right anterior hip MASD (Moisture associated skin damage)    Wound - Properties Group Placement Date: 09/10/23  -BL Placement Time: 2345  -BL Present on Hospital Admission: Y  -BL Side: Right  -BL Orientation: anterior  -BL Location: hip  -BL Primary Wound Type: MASD  -BL    Retired Wound - Properties Group Placement Date: 09/10/23  -BL Placement Time: 2345  -BL Present on Hospital Admission: Y  -BL Side: Right  -BL Orientation: anterior  -BL Location: hip  -BL Primary Wound Type: MASD  -BL    Retired Wound - Properties Group Date first assessed: 09/10/23  -BL Time first assessed: 2345  -BL Present on Hospital Admission: Y  -BL Side: Right  -BL Location: hip  -BL Primary Wound Type: MASD  -BL      Row Name             Wound 06/22/21 1133 Left lateral heel    Wound - Properties Group Placement Date: 06/22/21  -FABIOLA Placement Time: 1133  -FABIOLA Present on Hospital Admission: Y  -FABIOLA Side: Left  -FABIOLA Orientation: lateral  -FABIOLA Location: heel  -FABIOLA    Retired Wound - Properties Group Placement Date: 06/22/21  -FABIOLA Placement Time: 1133  -FABIOLA Present on Hospital Admission: Y  -FABIOLA Side: Left  -FABIOLA Orientation: lateral  -FABIOLA Location: heel  -FABIOLA    Retired Wound - Properties Group Date first assessed: 06/22/21  -FABIOLA Time first assessed: 1133  -FABIOLA Present on Hospital Admission: Y  -FABIOLA Side: Left  -FABIOLA Location: heel  -FABIOLA      Row Name             Wound 12/02/21 Right anterior foot Incision    Wound - Properties Group Placement Date: 12/02/21  -ES Side: Right  -ES Orientation: anterior  -ES Location:  foot  -ES Primary Wound Type: Incision  -ES    Retired Wound - Properties Group Placement Date: 12/02/21  -ES Side: Right  -ES Orientation: anterior  -ES Location: foot  -ES Primary Wound Type: Incision  -ES    Retired Wound - Properties Group Date first assessed: 12/02/21  -ES Side: Right  -ES Location: foot  -ES Primary Wound Type: Incision  -ES      Row Name             Wound 09/15/23 Right gluteal    Wound - Properties Group Placement Date: 09/15/23  -NH Side: Right  -NH Location: gluteal  -NH    Retired Wound - Properties Group Placement Date: 09/15/23  -NH Side: Right  -NH Location: gluteal  -NH    Retired Wound - Properties Group Date first assessed: 09/15/23  -NH Side: Right  -NH Location: gluteal  -NH      Row Name 09/17/23 1053          Plan of Care Review    Plan of Care Reviewed With patient  -DK     Progress no change  -DK       Row Name 09/17/23 1053          Positioning and Restraints    Pre-Treatment Position in bed  -DK     Post Treatment Position bed  -DK     In Bed supine;call light within reach;encouraged to call for assist;exit alarm on;side rails up x2;legs elevated;heels elevated  -DK       Row Name 09/17/23 1053          Therapy Assessment/Plan (PT)    Rehab Potential (PT) fair, will monitor progress closely  -DK     Criteria for Skilled Interventions Met (PT) skilled treatment is necessary  -DK     Therapy Frequency (PT) daily  -DK     Problem List (PT) problems related to;balance;mobility;range of motion (ROM);strength;pain  -DK     Activity Limitations Related to Problem List (PT) unable to ambulate safely;unable to transfer safely  -DK       Row Name 09/17/23 1053          Progress Summary (PT)    Progress Toward Functional Goals (PT) progress toward functional goals is gradual  -DK               User Key  (r) = Recorded By, (t) = Taken By, (c) = Cosigned By      Initials Name Provider Type    Sarah Bernstein, RN Registered Nurse    Dottie Foster RN Registered Nurse    PRIYANKA  Mckenna Wong, CURTIS Physical Therapist Assistant    Marlen Vaughn, RN Registered Nurse    Katlyn Garcia, RN Registered Nurse                    Physical Therapy Education       Title: PT OT SLP Therapies (Done)       Topic: Physical Therapy (Done)       Point: Mobility training (Done)       Learning Progress Summary             Patient Acceptance, E, VU,DU by RF at 9/16/2023 1804    Acceptance, E, VU by AV at 9/13/2023 1059    Acceptance, E,TB, VU by  at 9/12/2023 1308                         Point: Home exercise program (Done)       Learning Progress Summary             Patient Acceptance, E, VU,DU by RF at 9/16/2023 1804    Acceptance, E, VU by AV at 9/13/2023 1059    Acceptance, E,TB, VU by  at 9/12/2023 1308                         Point: Body mechanics (Done)       Learning Progress Summary             Patient Acceptance, E, VU,DU by RF at 9/16/2023 1804    Acceptance, E, VU by AV at 9/13/2023 1059    Acceptance, E,TB, VU by  at 9/12/2023 1308                         Point: Precautions (Done)       Learning Progress Summary             Patient Acceptance, E, VU,DU by RF at 9/16/2023 1804    Acceptance, E, VU by AV at 9/13/2023 1059    Acceptance, E,TB, VU by  at 9/12/2023 1308                                         User Key       Initials Effective Dates Name Provider Type Discipline     06/16/21 -  Uvaldo Christine OT Occupational Therapist OT     01/19/22 -  Karl Baker, RN Registered Nurse Nurse     08/16/23 -  Gamal Simmons PT Student PT Student PT                  PT Recommendation and Plan  Planned Therapy Interventions (PT): balance training, bed mobility training, gait training, home exercise program, strengthening, transfer training  Therapy Frequency (PT): daily  Progress Summary (PT)  Progress Toward Functional Goals (PT): progress toward functional goals is gradual  Plan of Care Reviewed With: patient  Progress: no change   Outcome Measures       Row Name 09/17/23  1053 09/14/23 1500          How much help from another person do you currently need...    Turning from your back to your side while in flat bed without using bedrails? 3  -DK 2  -AV     Moving from lying on back to sitting on the side of a flat bed without bedrails? 2  -DK 2  -AV     Moving to and from a bed to a chair (including a wheelchair)? 1  -DK 1  -AV     Standing up from a chair using your arms (e.g., wheelchair, bedside chair)? 1  -DK 2  -AV     Climbing 3-5 steps with a railing? 1  -DK 1  -AV     To walk in hospital room? 1  -DK 1  -AV     AM-PAC 6 Clicks Score (PT) 9  -DK 9  -AV        Functional Assessment    Outcome Measure Options AM-PAC 6 Clicks Basic Mobility (PT)  -DK AM-PAC 6 Clicks Basic Mobility (PT)  -AV               User Key  (r) = Recorded By, (t) = Taken By, (c) = Cosigned By      Initials Name Provider Type    Mckenna Landaverde PTA Physical Therapist Assistant    AV Frankie Ospina, MERLIN Physical Therapist                     Time Calculation:    PT Charges       Row Name 09/17/23 1056             Time Calculation    PT Received On 09/17/23  -DK      PT Goal Re-Cert Due Date 09/21/23  -DK         Timed Charges    66825 - PT Therapeutic Exercise Minutes 14  -DK      63362 - PT Therapeutic Activity Minutes 3  -DK         Total Minutes    Timed Charges Total Minutes 17  -DK       Total Minutes 17  -DK                User Key  (r) = Recorded By, (t) = Taken By, (c) = Cosigned By      Initials Name Provider Type    Mckenna Landaverde PTA Physical Therapist Assistant                  Therapy Charges for Today       Code Description Service Date Service Provider Modifiers Qty    45027061626 HC PT THER PROC EA 15 MIN 9/17/2023 Mckenna Wong PTA GP 1            PT G-Codes  Outcome Measure Options: AM-PAC 6 Clicks Basic Mobility (PT)  AM-PAC 6 Clicks Score (PT): 9  AM-PAC 6 Clicks Score (OT): 17    Mckenna Wong PTA  9/17/2023

## 2023-09-18 LAB
ALBUMIN SERPL-MCNC: 3 G/DL (ref 3.5–5.2)
ANION GAP SERPL CALCULATED.3IONS-SCNC: 8.7 MMOL/L (ref 5–15)
BUN SERPL-MCNC: 8 MG/DL (ref 8–23)
BUN/CREAT SERPL: 13.8 (ref 7–25)
CALCIUM SPEC-SCNC: 8.8 MG/DL (ref 8.6–10.5)
CHLORIDE SERPL-SCNC: 104 MMOL/L (ref 98–107)
CO2 SERPL-SCNC: 25.3 MMOL/L (ref 22–29)
CREAT SERPL-MCNC: 0.58 MG/DL (ref 0.76–1.27)
DEPRECATED RDW RBC AUTO: 58.6 FL (ref 37–54)
EGFRCR SERPLBLD CKD-EPI 2021: 108.9 ML/MIN/1.73
ERYTHROCYTE [DISTWIDTH] IN BLOOD BY AUTOMATED COUNT: 20.1 % (ref 12.3–15.4)
GLUCOSE BLDC GLUCOMTR-MCNC: 172 MG/DL (ref 70–99)
GLUCOSE BLDC GLUCOMTR-MCNC: 224 MG/DL (ref 70–99)
GLUCOSE BLDC GLUCOMTR-MCNC: 269 MG/DL (ref 70–99)
GLUCOSE BLDC GLUCOMTR-MCNC: 324 MG/DL (ref 70–99)
GLUCOSE SERPL-MCNC: 212 MG/DL (ref 65–99)
HCT VFR BLD AUTO: 36.6 % (ref 37.5–51)
HGB BLD-MCNC: 11.4 G/DL (ref 13–17.7)
MCH RBC QN AUTO: 25.2 PG (ref 26.6–33)
MCHC RBC AUTO-ENTMCNC: 31.1 G/DL (ref 31.5–35.7)
MCV RBC AUTO: 81 FL (ref 79–97)
PHOSPHATE SERPL-MCNC: 4.3 MG/DL (ref 2.5–4.5)
PLATELET # BLD AUTO: 105 10*3/MM3 (ref 140–450)
PMV BLD AUTO: 11.2 FL (ref 6–12)
POTASSIUM SERPL-SCNC: 3.8 MMOL/L (ref 3.5–5.2)
RBC # BLD AUTO: 4.52 10*6/MM3 (ref 4.14–5.8)
SODIUM SERPL-SCNC: 138 MMOL/L (ref 136–145)
WBC NRBC COR # BLD: 3.78 10*3/MM3 (ref 3.4–10.8)

## 2023-09-18 PROCEDURE — 99232 SBSQ HOSP IP/OBS MODERATE 35: CPT | Performed by: INTERNAL MEDICINE

## 2023-09-18 PROCEDURE — 85027 COMPLETE CBC AUTOMATED: CPT | Performed by: INTERNAL MEDICINE

## 2023-09-18 PROCEDURE — 80069 RENAL FUNCTION PANEL: CPT | Performed by: INTERNAL MEDICINE

## 2023-09-18 PROCEDURE — 63710000001 INSULIN LISPRO (HUMAN) PER 5 UNITS: Performed by: FAMILY MEDICINE

## 2023-09-18 PROCEDURE — 63710000001 INSULIN DETEMIR PER 5 UNITS: Performed by: INTERNAL MEDICINE

## 2023-09-18 PROCEDURE — 82948 REAGENT STRIP/BLOOD GLUCOSE: CPT

## 2023-09-18 RX ORDER — CHOLECALCIFEROL (VITAMIN D3) 125 MCG
1000 CAPSULE ORAL DAILY
Status: DISCONTINUED | OUTPATIENT
Start: 2023-09-18 | End: 2023-11-06 | Stop reason: HOSPADM

## 2023-09-18 RX ADMIN — PREGABALIN 50 MG: 25 CAPSULE ORAL at 15:04

## 2023-09-18 RX ADMIN — BACLOFEN 10 MG: 10 TABLET ORAL at 17:55

## 2023-09-18 RX ADMIN — INSULIN LISPRO 4 UNITS: 100 INJECTION, SOLUTION INTRAVENOUS; SUBCUTANEOUS at 17:55

## 2023-09-18 RX ADMIN — INSULIN DETEMIR 30 UNITS: 100 INJECTION, SOLUTION SUBCUTANEOUS at 21:12

## 2023-09-18 RX ADMIN — ATORVASTATIN CALCIUM 10 MG: 10 TABLET, FILM COATED ORAL at 21:23

## 2023-09-18 RX ADMIN — APIXABAN 5 MG: 5 TABLET, FILM COATED ORAL at 09:55

## 2023-09-18 RX ADMIN — INSULIN DETEMIR 30 UNITS: 100 INJECTION, SOLUTION SUBCUTANEOUS at 09:54

## 2023-09-18 RX ADMIN — INSULIN LISPRO 3 UNITS: 100 INJECTION, SOLUTION INTRAVENOUS; SUBCUTANEOUS at 12:11

## 2023-09-18 RX ADMIN — FAMOTIDINE 40 MG: 20 TABLET, FILM COATED ORAL at 09:54

## 2023-09-18 RX ADMIN — INSULIN LISPRO 2 UNITS: 100 INJECTION, SOLUTION INTRAVENOUS; SUBCUTANEOUS at 09:54

## 2023-09-18 RX ADMIN — Medication 10 ML: at 21:13

## 2023-09-18 RX ADMIN — HYDROCODONE BITARTRATE AND ACETAMINOPHEN 1 TABLET: 7.5; 325 TABLET ORAL at 03:44

## 2023-09-18 RX ADMIN — PREGABALIN 50 MG: 25 CAPSULE ORAL at 21:12

## 2023-09-18 RX ADMIN — APIXABAN 5 MG: 5 TABLET, FILM COATED ORAL at 21:12

## 2023-09-18 RX ADMIN — Medication: at 12:12

## 2023-09-18 RX ADMIN — HYDROCODONE BITARTRATE AND ACETAMINOPHEN 1 TABLET: 7.5; 325 TABLET ORAL at 21:12

## 2023-09-18 RX ADMIN — HYDROCODONE BITARTRATE AND ACETAMINOPHEN 1 TABLET: 7.5; 325 TABLET ORAL at 15:04

## 2023-09-18 RX ADMIN — CYANOCOBALAMIN TAB 500 MCG 1000 MCG: 500 TAB at 15:04

## 2023-09-18 RX ADMIN — MICONAZOLE NITRATE 1 APPLICATION: 2 POWDER TOPICAL at 21:13

## 2023-09-18 RX ADMIN — HYDROCODONE BITARTRATE AND ACETAMINOPHEN 1 TABLET: 7.5; 325 TABLET ORAL at 10:02

## 2023-09-18 RX ADMIN — PREGABALIN 50 MG: 25 CAPSULE ORAL at 09:55

## 2023-09-18 RX ADMIN — BACLOFEN 10 MG: 10 TABLET ORAL at 03:42

## 2023-09-18 RX ADMIN — INSULIN LISPRO 7 UNITS: 100 INJECTION, SOLUTION INTRAVENOUS; SUBCUTANEOUS at 21:12

## 2023-09-18 RX ADMIN — Medication 10 ML: at 09:54

## 2023-09-18 RX ADMIN — MICONAZOLE NITRATE 1 APPLICATION: 2 POWDER TOPICAL at 12:12

## 2023-09-18 NOTE — PROGRESS NOTES
[facility]   Hospitalist Progress Note  Date: 2023  Patient Name: Jose Shaikh  : 1960  MRN: [id number]  Date of admission: 9/10/2023  Room/Bed: [facility]/      Subjective   Subjective     Chief Complaint: Weakness    Summary:Jose Shaikh is a 62 y.o. male with multiple medical problems [facility].  He stated that he could barely move or get out of his car so EMS was called.  He said that due to nonweightbearing restrictions he was unable to work on his lower extremity weakness at rehab and was unable to care for himself upon discharge.    Interval Followup: no new issues.  Awaiting placement.    Review of Systems    All systems reviewed and negative except for what is outlined above.      Objective   Objective     Vitals:   Temp:  [97.2 °F (36.2 °C)-98.4 °F (36.9 °C)] 97.2 °F (36.2 °C)  Heart Rate:  [] 88  Resp:  [16-20] 18  BP: (114-141)/(55-77) 129/69    Physical Exam   General: Awake, alert, NAD  HENT: NCAT, MMM  Eyes: pupils equal, no scleral icterus  Cardiovascular: RRR, no murmurs   Pulmonary: CTA bilaterally; no wheezes; no conversational dyspnea  Gastrointestinal: S/ND/NT, +BS  Musculoskeletal: s/p partial amputation right foot  Skin: No jaundice, no rash on exposed skin appreciated  Neuro: CN II through XII grossly intact; speech clear; no tremor  Psych: Mood and affect appropriate      Result Review    Result Review:  I have personally reviewed these results:  [x]  Laboratory      Lab 23  0643 2331 23   WBC 4.05 3.97 3.44   HEMOGLOBIN 11.9* 11.9* 11.9*   HEMATOCRIT 38.7 38.2 36.8*   PLATELETS 128* 120* 106*   NEUTROS ABS 2.54 1.99 1.84   IMMATURE GRANS (ABS) 0.01 0.01 0.01   LYMPHS ABS 0.96 1.23 1.00   MONOS ABS 0.41 0.55 0.46   EOS ABS 0.10 0.15 0.11   MCV 81.1 81.6 79.7           Lab 2331 23  0623   SODIUM 139 138   POTASSIUM 4.5 3.7   CHLORIDE 106 104   CO2 26.5 24.3   ANION GAP 6.5  9.7   BUN 8 8   CREATININE 0.69* 0.52*   EGFR 103.3 112.6   GLUCOSE 202* 215*   CALCIUM 8.5* 8.6   PHOSPHORUS  --  3.9           Lab 09/12/23  0623   ALBUMIN 2.9*                   Lab 09/14/23  0643   IRON 58*   IRON SATURATION (TSAT) 17*   TIBC 340   TRANSFERRIN 228   FOLATE 7.26   VITAMIN B 12 466           Brief Urine Lab Results  (Last result in the past 365 days)        Color   Clarity   Blood   Leuk Est   Nitrite   Protein   CREAT   Urine HCG        09/11/23 2200 Dark Yellow   Clear   Negative   Negative   Negative   Negative                 [x]  Microbiology   Microbiology Results (last 10 days)       ** No results found for the last 240 hours. **          [x]  Radiology  No radiology results for the last 7 days  []  EKG/Telemetry   []  Cardiology/Vascular   []  Pathology  []  Old records  []  Other:    Assessment & Plan   Assessment / Plan     Assessment:  Generalized weakness  Diabetes  Low sodium likely a lab error given drastic change this morning  Hypertension  History of atrial fibrillation  Morbid obesity  Debility with wheelchair dependent  Chronic wound    Plan:  Patient placed back in the hospital  PT/OT  Continue current medications  Increased Lyrica for more neuropathic pain control  Awaiting rehab arrangements.    Discussed with RN.    DVT prophylaxis:  Medical DVT prophylaxis orders are present.    CODE STATUS:     Electronically signed by Jose Maya MD, 09/18/23, 11:06 AM EDT.

## 2023-09-18 NOTE — PLAN OF CARE
Goal Outcome Evaluation:              Outcome Evaluation: aox4. medicated for c/o pain per mar. wound and skin care provided per orders. frequently turned and repositioned. blood glucose monitored. awaiting rehab placement.

## 2023-09-18 NOTE — PLAN OF CARE
Goal Outcome Evaluation:  Plan of Care Reviewed With: patient        Progress: improving  Outcome Evaluation: pt alert and oriented x4. medicated per order for c/o pain in the lower extremities. blood glucose monitored. skin care preformed per order. no other concerns at this time.

## 2023-09-19 LAB
GLUCOSE BLDC GLUCOMTR-MCNC: 181 MG/DL (ref 70–99)
GLUCOSE BLDC GLUCOMTR-MCNC: 195 MG/DL (ref 70–99)
GLUCOSE BLDC GLUCOMTR-MCNC: 282 MG/DL (ref 70–99)
GLUCOSE BLDC GLUCOMTR-MCNC: 315 MG/DL (ref 70–99)

## 2023-09-19 PROCEDURE — 82948 REAGENT STRIP/BLOOD GLUCOSE: CPT

## 2023-09-19 PROCEDURE — 63710000001 INSULIN DETEMIR PER 5 UNITS: Performed by: INTERNAL MEDICINE

## 2023-09-19 PROCEDURE — 99231 SBSQ HOSP IP/OBS SF/LOW 25: CPT | Performed by: INTERNAL MEDICINE

## 2023-09-19 PROCEDURE — 63710000001 INSULIN LISPRO (HUMAN) PER 5 UNITS: Performed by: FAMILY MEDICINE

## 2023-09-19 RX ADMIN — INSULIN DETEMIR 30 UNITS: 100 INJECTION, SOLUTION SUBCUTANEOUS at 21:26

## 2023-09-19 RX ADMIN — INSULIN LISPRO 2 UNITS: 100 INJECTION, SOLUTION INTRAVENOUS; SUBCUTANEOUS at 08:22

## 2023-09-19 RX ADMIN — MICONAZOLE NITRATE 1 APPLICATION: 2 POWDER TOPICAL at 21:28

## 2023-09-19 RX ADMIN — Medication 10 ML: at 08:23

## 2023-09-19 RX ADMIN — INSULIN LISPRO 7 UNITS: 100 INJECTION, SOLUTION INTRAVENOUS; SUBCUTANEOUS at 21:26

## 2023-09-19 RX ADMIN — Medication 10 ML: at 21:28

## 2023-09-19 RX ADMIN — Medication: at 10:07

## 2023-09-19 RX ADMIN — ATORVASTATIN CALCIUM 10 MG: 10 TABLET, FILM COATED ORAL at 21:26

## 2023-09-19 RX ADMIN — PREGABALIN 50 MG: 25 CAPSULE ORAL at 16:14

## 2023-09-19 RX ADMIN — APIXABAN 5 MG: 5 TABLET, FILM COATED ORAL at 08:22

## 2023-09-19 RX ADMIN — PREGABALIN 50 MG: 25 CAPSULE ORAL at 08:22

## 2023-09-19 RX ADMIN — HYDROCODONE BITARTRATE AND ACETAMINOPHEN 1 TABLET: 7.5; 325 TABLET ORAL at 12:38

## 2023-09-19 RX ADMIN — HYDROCODONE BITARTRATE AND ACETAMINOPHEN 1 TABLET: 7.5; 325 TABLET ORAL at 07:33

## 2023-09-19 RX ADMIN — MICONAZOLE NITRATE 1 APPLICATION: 2 POWDER TOPICAL at 08:24

## 2023-09-19 RX ADMIN — CYANOCOBALAMIN TAB 500 MCG 1000 MCG: 500 TAB at 08:22

## 2023-09-19 RX ADMIN — HYDROCODONE BITARTRATE AND ACETAMINOPHEN 1 TABLET: 7.5; 325 TABLET ORAL at 17:25

## 2023-09-19 RX ADMIN — PREGABALIN 50 MG: 25 CAPSULE ORAL at 21:26

## 2023-09-19 RX ADMIN — INSULIN LISPRO 6 UNITS: 100 INJECTION, SOLUTION INTRAVENOUS; SUBCUTANEOUS at 17:25

## 2023-09-19 RX ADMIN — INSULIN LISPRO 2 UNITS: 100 INJECTION, SOLUTION INTRAVENOUS; SUBCUTANEOUS at 12:38

## 2023-09-19 RX ADMIN — FAMOTIDINE 40 MG: 20 TABLET, FILM COATED ORAL at 08:22

## 2023-09-19 RX ADMIN — INSULIN DETEMIR 30 UNITS: 100 INJECTION, SOLUTION SUBCUTANEOUS at 08:22

## 2023-09-19 RX ADMIN — HYDROCODONE BITARTRATE AND ACETAMINOPHEN 1 TABLET: 7.5; 325 TABLET ORAL at 21:27

## 2023-09-19 RX ADMIN — APIXABAN 5 MG: 5 TABLET, FILM COATED ORAL at 21:26

## 2023-09-19 NOTE — PROGRESS NOTES
[facility]   Hospitalist Progress Note  Date: 2023  Patient Name: Jose Shaikh  : 1960  MRN: [id number]  Date of admission: 9/10/2023  Room/Bed: [facility]      Subjective   Subjective     Chief Complaint: Weakness    Summary:Jose Shaikh is a 62 y.o. male with multiple medical problems just discharged from [facility] the day prior to admission.  He stated that he could barely move or get out of his car so EMS was called.  He said that due to nonweightbearing restrictions he was unable to work on his lower extremity weakness at rehab and was unable to care for himself upon discharge.    Interval Followup: No acute events overnight, patient resting comfortably in bed      Objective   Objective     Vitals:   Temp:  [97.3 °F (36.3 °C)-98.3 °F (36.8 °C)] 98.1 °F (36.7 °C)  Heart Rate:  [89-96] 96  Resp:  [18-20] 20  BP: (122-135)/(58-72) 122/58    Physical Exam   GEN: No acute distress  HEENT: Moist mucous membranes  LUNGS: Equal chest rise bilaterally  CARDIAC: Regular rate and rhythm  NEURO: Moving all 4 extremities spontaneously  SKIN: No obvious breakdown      Result Review    Result Review:  I have personally reviewed these results:  [x]  Laboratory      Lab 23  0930 23  0643 23  0531   WBC 3.78 4.05 3.97   HEMOGLOBIN 11.4* 11.9* 11.9*   HEMATOCRIT 36.6* 38.7 38.2   PLATELETS 105* 128* 120*   NEUTROS ABS  --  2.54 1.99   IMMATURE GRANS (ABS)  --  0.01 0.01   LYMPHS ABS  --  0.96 1.23   MONOS ABS  --  0.41 0.55   EOS ABS  --  0.10 0.15   MCV 81.0 81.1 81.6           Lab 23  0930 23  0531   SODIUM 138 139   POTASSIUM 3.8 4.5   CHLORIDE 104 106   CO2 25.3 26.5   ANION GAP 8.7 6.5   BUN 8 8   CREATININE 0.58* 0.69*   EGFR 108.9 103.3   GLUCOSE 212* 202*   CALCIUM 8.8 8.5*   PHOSPHORUS 4.3  --            Lab 23  0930   ALBUMIN 3.0*                   Lab 23  0643   IRON 58*   IRON SATURATION (TSAT) 17*   TIBC 340   TRANSFERRIN 228   FOLATE 7.26   VITAMIN B  12 466           Brief Urine Lab Results  (Last result in the past 365 days)        Color   Clarity   Blood   Leuk Est   Nitrite   Protein   CREAT   Urine HCG        09/11/23 2200 Dark Yellow   Clear   Negative   Negative   Negative   Negative                 [x]  Microbiology   Microbiology Results (last 10 days)       ** No results found for the last 240 hours. **          [x]  Radiology  No radiology results for the last 7 days  []  EKG/Telemetry   []  Cardiology/Vascular   []  Pathology  []  Old records  []  Other:    Assessment & Plan   Assessment / Plan     Assessment:  Generalized weakness  Diabetes  Low sodium likely a lab error given drastic change this morning  Hypertension  History of atrial fibrillation  Morbid obesity  Debility with wheelchair dependent  Chronic wound    Plan:  Patient placed back in the hospital  Continue PT/OT  Continue current medications  Continue increased dose of Lyrica for more neuropathic pain control  Awaiting rehab arrangements.    Discussed with RN.    DVT prophylaxis:  Medical DVT prophylaxis orders are present.    CODE STATUS:       Electronically signed by Max Mehta MD, 09/19/23, 1:35 PM EDT.

## 2023-09-19 NOTE — CONSULTS
"Nutrition Services    Patient Name: Jose Shaikh  YOB: 1960  MRN: [id number]  Admission date: 9/10/2023      CLINICAL NUTRITION ASSESSMENT      Reason for Assessment  Follow-up protocol     H&P:    Past Medical History:   Diagnosis Date    Absence of toe of right foot     Acute osteomyelitis of left calcaneus  8/18/2021    Anxiety and depression     Arthritis     Claustrophobia     Corns and callus     Diabetic ulcer of left heel associated with type 2 DM 8/18/2021    Diabetic ulcer of left heel associated with type 2 DM 7/6/2021    Diabetic ulcer of right midfoot associated with type 2 DM 8/18/2021    Difficulty walking     Essential hypertension 8/31/2021    Hammertoe     Hyperlipidemia LDL goal <100 8/31/2021    Ingrown toenail     Obesity     Paroxysmal atrial fibrillation 8/31/2021    Polyneuropathy     Pressure ulcer, stage 1     Tinea unguium     Type 2 diabetes mellitus with polyneuropathy         Current Problems:   Active Hospital Problems    Diagnosis     **Generalized weakness     Type 2 diabetes mellitus, with long-term current use of insulin     Class 3 severe obesity due to excess calories with serious comorbidity and body mass index (BMI) of 45.0 to 49.9 in adult     Dependence on wheelchair     Foot pain, bilateral     Paroxysmal atrial fibrillation     Essential hypertension     Diabetic ulcer of left heel associated with type 2 DM         Nutrition/Diet History         Narrative     62-year-old male admitted for generalized weakness.  Status post amputation of toes, to right foot, following osteomyelitis.  Past medical history described above.      Nutrition follow up.  Patient continues to consume 100% of most meals.  No nutrition interventions indicated at this time.       Anthropometrics        Current Height, Weight Height: 188 cm (74\")  Weight: (!) 151 kg (334 lb)   Current BMI Body mass index is 42.88 kg/m².       Weight Hx  Wt Readings from Last 30 Encounters:   09/11/23 " 0030 (!) 151 kg (334 lb)   09/10/23 1844 (!) 154 kg (338 lb 10 oz)   08/30/23 1112 (!) 157 kg (347 lb)   08/01/23 0932 (!) 158 kg (347 lb 10.7 oz)   07/31/23 2347 (!) 163 kg (358 lb 7.5 oz)   06/16/23 2333 (!) 155 kg (342 lb 2.5 oz)   06/16/23 1053 (!) 155 kg (342 lb 3.2 oz)   06/15/23 0505 (!) 149 kg (328 lb 14.4 oz)   06/14/23 0455 (!) 149 kg (328 lb 4.8 oz)   06/13/23 1703 (!) 149 kg (328 lb 11.2 oz)   06/13/23 0600 (!) 170 kg (374 lb 12.5 oz)   06/12/23 0420 (!) 160 kg (352 lb 11.8 oz)   06/11/23 0447 (!) 150 kg (331 lb 5.6 oz)   06/10/23 0500 (!) 150 kg (330 lb 14.6 oz)   06/09/23 0536 (!) 156 kg (343 lb 7.6 oz)   06/08/23 1826 (!) 156 kg (344 lb 11.2 oz)   06/08/23 0522 (!) 158 kg (348 lb 8.8 oz)   06/07/23 0548 (!) 155 kg (342 lb 9.5 oz)   06/06/23 0515 (!) 155 kg (341 lb 14.9 oz)   06/05/23 0445 (!) 155 kg (341 lb 9.6 oz)   06/05/23 0348 (!) 158 kg (349 lb 3.3 oz)   06/04/23 0555 (!) 157 kg (346 lb 3.2 oz)   06/03/23 0539 (!) 158 kg (349 lb)   06/02/23 0548 (!) 163 kg (359 lb 3.2 oz)   06/01/23 0600 (!) 164 kg (362 lb)   05/31/23 0507 (!) 164 kg (362 lb 4.8 oz)   05/30/23 0635 (!) 164 kg (362 lb 7 oz)   05/29/23 0500 (!) 167 kg (368 lb 2.7 oz)   05/28/23 0600 (!) 167 kg (367 lb 11.6 oz)   05/27/23 0600 (!) 167 kg (369 lb 0.8 oz)   05/26/23 0529 (!) 167 kg (369 lb 3.2 oz)   05/25/23 0600 (!) 168 kg (370 lb 3.2 oz)   05/24/23 0600 (!) 166 kg (366 lb 9.6 oz)   05/22/23 0529 (!) 169 kg (373 lb 7.4 oz)   05/21/23 0600 (!) 169 kg (371 lb 12.8 oz)   05/20/23 0600 (!) 171 kg (376 lb 5.2 oz)   05/19/23 0300 (!) 168 kg (370 lb 14.4 oz)   05/18/23 1912 (!) 168 kg (371 lb 7.6 oz)   05/18/23 0600 (!) 169 kg (371 lb 11.1 oz)   05/16/23 0700 (!) 171 kg (377 lb 4.8 oz)   05/14/23 0500 (!) 171 kg (377 lb 3.3 oz)   05/12/23 1143 (!) 170 kg (375 lb)   05/06/23 0258 (!) 170 kg (375 lb 8 oz)   04/19/23 0909 (!) 163 kg (359 lb)   04/03/23 1906 (!) 168 kg (370 lb)   03/27/23 0938 (!) 170 kg (373 lb 10.9 oz)   03/17/23 1153 (!) 168  kg (370 lb)   01/27/23 1501 (!) 168 kg (370 lb)   12/22/22 1501 (!) 171 kg (376 lb)   11/08/22 1035 (!) 161 kg (355 lb)   10/01/22 1141 (!) 164 kg (360 lb 10.8 oz)   05/18/22 1311 (!) 155 kg (341 lb)   03/24/22 1432 (!) 155 kg (341 lb)   03/02/22 1412 (!) 155 kg (341 lb)   01/12/22 1317 (!) 155 kg (341 lb)   12/30/21 1431 (!) 155 kg (341 lb)   12/01/21 1144 (!) 155 kg (341 lb 11.4 oz)   12/01/21 0843 (!) 157 kg (346 lb)   11/22/21 0839 (!) 157 kg (346 lb)   11/15/21 1148 (!) 157 kg (346 lb 12.5 oz)   11/09/21 1139 (!) 157 kg (345 lb 7.4 oz)   11/05/21 1130 (!) 159 kg (351 lb 3.1 oz)   11/02/21 1121 (!) 161 kg (356 lb)   10/27/21 1048 (!) 161 kg (356 lb)   10/21/21 1418 (!) 162 kg (356 lb 14.8 oz)   10/20/21 1120 (!) 160 kg (353 lb)            Wt Change Observation Patient has lost 36 pounds in the past 6 months (9.7%).     Estimated/Assessed Needs       Energy Requirements Estimated nutrient needs using adj body weight 110 kg   EST Needs (kcal/day) 25 kcals per kilogram = 2750 aracelis       Protein Requirements    EST Daily Needs (g/day) 1.0 to 1.2 g/kg = 110 to 132 g of protein       Fluid Requirements     Estimated Needs (mL/day) 25 mL/kg = 2750 mL (11 to 12 cups)     Labs/Medications         Pertinent Labs Reviewed.   Results from last 7 days   Lab Units 09/18/23  0930 09/13/23  0531   SODIUM mmol/L 138 139   POTASSIUM mmol/L 3.8 4.5   CHLORIDE mmol/L 104 106   CO2 mmol/L 25.3 26.5   BUN mg/dL 8 8   CREATININE mg/dL 0.58* 0.69*   CALCIUM mg/dL 8.8 8.5*   GLUCOSE mg/dL 212* 202*       Results from last 7 days   Lab Units 09/18/23  0930   PHOSPHORUS mg/dL 4.3   HEMOGLOBIN g/dL 11.4*   HEMATOCRIT % 36.6*       COVID19   Date Value Ref Range Status   05/12/2023 Not Detected Not Detected - Ref. Range Final     Lab Results   Component Value Date    HGBA1C 6.60 (H) 04/19/2023         Pertinent Medications Reviewed.     Current Nutrition Orders & Evaluation of Intake       Oral Nutrition     Current PO Diet Diet: Diabetic  Diets, High Protein Diet; Consistent Carbohydrate; Texture: Regular Texture (IDDSI 7); Fluid Consistency: Thin (IDDSI 0)   Supplement No active supplement orders       Malnutrition Severity Assessment                Nutrition Diagnosis         Nutrition Dx Problem 1 Increased nutrient needs related to increased nutrient needs due to catabolic disease as evidenced by  Non healing foot ulcer.       Nutrition Intervention         Consistent Carbohydrate Diet, regular texture, thin liquids  High protein diet.     Medical Nutrition Therapy/Nutrition Education          Learner     Readiness Patient  Acceptance     Method     Response Explanation  Verbalizes understanding     Monitor/Evaluation        Monitor Per protocol, PO intake, Weight, Skin status, POC/GOC       Nutrition Discharge Plan         To be determined       Electronically signed by:  Kacey Montoya RD  09/19/23 13:36 EDT

## 2023-09-19 NOTE — PLAN OF CARE
Goal Outcome Evaluation:  Plan of Care Reviewed With: patient        Progress: no change  Outcome Evaluation: Patient remains alert and oriented x4. Medicated with PRN pain medication per MAR. Blood glucose monitored. Wound care performed as ordered. Patient turned and repositioned frequently throughout the shift. No new issues at this time.

## 2023-09-20 LAB
GLUCOSE BLDC GLUCOMTR-MCNC: 199 MG/DL (ref 70–99)
GLUCOSE BLDC GLUCOMTR-MCNC: 241 MG/DL (ref 70–99)
GLUCOSE BLDC GLUCOMTR-MCNC: 261 MG/DL (ref 70–99)
GLUCOSE BLDC GLUCOMTR-MCNC: 265 MG/DL (ref 70–99)

## 2023-09-20 PROCEDURE — 99232 SBSQ HOSP IP/OBS MODERATE 35: CPT | Performed by: INTERNAL MEDICINE

## 2023-09-20 PROCEDURE — 97110 THERAPEUTIC EXERCISES: CPT

## 2023-09-20 PROCEDURE — 63710000001 INSULIN DETEMIR PER 5 UNITS: Performed by: INTERNAL MEDICINE

## 2023-09-20 PROCEDURE — 82948 REAGENT STRIP/BLOOD GLUCOSE: CPT

## 2023-09-20 PROCEDURE — 63710000001 INSULIN LISPRO (HUMAN) PER 5 UNITS: Performed by: FAMILY MEDICINE

## 2023-09-20 RX ORDER — DIPHENOXYLATE HYDROCHLORIDE AND ATROPINE SULFATE 2.5; .025 MG/1; MG/1
1 TABLET ORAL 4 TIMES DAILY PRN
Status: CANCELLED | OUTPATIENT
Start: 2023-09-20

## 2023-09-20 RX ADMIN — INSULIN DETEMIR 30 UNITS: 100 INJECTION, SOLUTION SUBCUTANEOUS at 21:17

## 2023-09-20 RX ADMIN — ATORVASTATIN CALCIUM 10 MG: 10 TABLET, FILM COATED ORAL at 21:20

## 2023-09-20 RX ADMIN — PREGABALIN 50 MG: 25 CAPSULE ORAL at 21:17

## 2023-09-20 RX ADMIN — INSULIN DETEMIR 30 UNITS: 100 INJECTION, SOLUTION SUBCUTANEOUS at 08:44

## 2023-09-20 RX ADMIN — INSULIN LISPRO 6 UNITS: 100 INJECTION, SOLUTION INTRAVENOUS; SUBCUTANEOUS at 12:18

## 2023-09-20 RX ADMIN — MICONAZOLE NITRATE 1 APPLICATION: 2 POWDER TOPICAL at 09:44

## 2023-09-20 RX ADMIN — MICONAZOLE NITRATE 1 APPLICATION: 2 POWDER TOPICAL at 21:21

## 2023-09-20 RX ADMIN — INSULIN LISPRO 6 UNITS: 100 INJECTION, SOLUTION INTRAVENOUS; SUBCUTANEOUS at 21:17

## 2023-09-20 RX ADMIN — APIXABAN 5 MG: 5 TABLET, FILM COATED ORAL at 08:44

## 2023-09-20 RX ADMIN — FAMOTIDINE 40 MG: 20 TABLET, FILM COATED ORAL at 08:44

## 2023-09-20 RX ADMIN — INSULIN LISPRO 2 UNITS: 100 INJECTION, SOLUTION INTRAVENOUS; SUBCUTANEOUS at 08:44

## 2023-09-20 RX ADMIN — Medication 10 ML: at 08:45

## 2023-09-20 RX ADMIN — Medication 10 ML: at 21:17

## 2023-09-20 RX ADMIN — APIXABAN 5 MG: 5 TABLET, FILM COATED ORAL at 21:17

## 2023-09-20 RX ADMIN — PREGABALIN 50 MG: 25 CAPSULE ORAL at 08:44

## 2023-09-20 RX ADMIN — PREGABALIN 50 MG: 25 CAPSULE ORAL at 16:40

## 2023-09-20 RX ADMIN — HYDROCODONE BITARTRATE AND ACETAMINOPHEN 1 TABLET: 7.5; 325 TABLET ORAL at 11:15

## 2023-09-20 RX ADMIN — CYANOCOBALAMIN TAB 500 MCG 1000 MCG: 500 TAB at 08:44

## 2023-09-20 RX ADMIN — HYDROCODONE BITARTRATE AND ACETAMINOPHEN 1 TABLET: 7.5; 325 TABLET ORAL at 16:36

## 2023-09-20 RX ADMIN — Medication: at 09:44

## 2023-09-20 RX ADMIN — HYDROCODONE BITARTRATE AND ACETAMINOPHEN 1 TABLET: 7.5; 325 TABLET ORAL at 21:18

## 2023-09-20 RX ADMIN — INSULIN LISPRO 4 UNITS: 100 INJECTION, SOLUTION INTRAVENOUS; SUBCUTANEOUS at 17:44

## 2023-09-20 NOTE — PROGRESS NOTES
[facility]   Hospitalist Progress Note  Date: 2023  Patient Name: Jose Shaikh  : 1960  MRN: [id number]  Date of admission: 9/10/2023  Room/Bed: [facility]      Subjective   Subjective     Chief Complaint: Weakness    Summary:Jose Shaikh is a 62 y.o. male with multiple medical problems just discharged from [facility] the day prior to admission.  He stated that he could barely move or get out of his car so EMS was called.  He said that due to nonweightbearing restrictions he was unable to work on his lower extremity weakness at rehab and was unable to care for himself upon discharge.    Interval Followup: Patient complaining of frequent bowel movements, denies abdominal pain, still very weak and debilitated      Objective   Objective     Vitals:   Temp:  [97.9 °F (36.6 °C)-98.8 °F (37.1 °C)] 97.9 °F (36.6 °C)  Heart Rate:  [89-96] 92  Resp:  [18-20] 18  BP: (116-139)/(53-70) 134/70    Physical Exam   GEN: No acute distress  HEENT: Moist mucous membranes  LUNGS: Equal chest rise bilaterally  CARDIAC: Regular rate and rhythm  NEURO: Moving all 4 extremities spontaneously  SKIN: No obvious breakdown      Result Review    Result Review:  I have personally reviewed these results:  [x]  Laboratory      Lab 23  0930 23  0643   WBC 3.78 4.05   HEMOGLOBIN 11.4* 11.9*   HEMATOCRIT 36.6* 38.7   PLATELETS 105* 128*   NEUTROS ABS  --  2.54   IMMATURE GRANS (ABS)  --  0.01   LYMPHS ABS  --  0.96   MONOS ABS  --  0.41   EOS ABS  --  0.10   MCV 81.0 81.1           Lab 23  0930   SODIUM 138   POTASSIUM 3.8   CHLORIDE 104   CO2 25.3   ANION GAP 8.7   BUN 8   CREATININE 0.58*   EGFR 108.9   GLUCOSE 212*   CALCIUM 8.8   PHOSPHORUS 4.3           Lab 23  0930   ALBUMIN 3.0*                   Lab 23  0643   IRON 58*   IRON SATURATION (TSAT) 17*   TIBC 340   TRANSFERRIN 228   FOLATE 7.26   VITAMIN B 12 466           Brief Urine Lab Results  (Last result in the past 365 days)        Color    Clarity   Blood   Leuk Est   Nitrite   Protein   CREAT   Urine HCG        09/11/23 2200 Dark Yellow   Clear   Negative   Negative   Negative   Negative                 [x]  Microbiology   Microbiology Results (last 10 days)       ** No results found for the last 240 hours. **          [x]  Radiology  No radiology results for the last 7 days  []  EKG/Telemetry   []  Cardiology/Vascular   []  Pathology  []  Old records  []  Other:    Assessment & Plan   Assessment / Plan     Assessment:  Generalized weakness  Diabetes  Low sodium likely a lab error given drastic change this morning  Hypertension  History of atrial fibrillation  Morbid obesity  Debility with wheelchair dependent  Chronic wound    Plan:  Patient placed back in the hospital  Continue PT/OT  Continue current medications  Add Lomotil today for loose bowel movements as needed  Continue wound care  Vitals reviewed  Continue increased dose of Lyrica for more neuropathic pain control  Awaiting rehab arrangements.    Reviewed patients labs and imaging, and discussed with patient and nurse at bedside.      DVT prophylaxis:  Medical DVT prophylaxis orders are present.    CODE STATUS:       Electronically signed by Max Mehta MD, 09/20/23, 10:12 AM EDT.

## 2023-09-20 NOTE — PLAN OF CARE
Goal Outcome Evaluation:  Plan of Care Reviewed With: patient        Progress: no change  Outcome Evaluation: Patienr remains alert and oriented x4. Medicated with PRN pain medication per MAR. Blood glucose monitored. Wound and skin care performed as ordered. No new issues at this time. Pending rehab placement.

## 2023-09-20 NOTE — THERAPY TREATMENT NOTE
Acute Care - Physical Therapy Treatment Note  KEO Dominguez     Patient Name: Jose Shaikh  : 1960  MRN: [id number]  Today's Date: 2023      Visit Dx:     ICD-10-CM ICD-9-CM   1. Generalized weakness  R53.1 780.79   2. Hyponatremia  E87.1 276.1   3. Difficulty in walking  R26.2 719.7   4. Decreased activities of daily living (ADL)  Z78.9 V49.89     Patient Active Problem List   Diagnosis    Diabetic ulcer of left heel associated with type 2 DM    Acute osteomyelitis of left calcaneus     Diabetic ulcer of left heel associated with type 2 DM    Diabetic ulcer of right midfoot associated with type 2 DM    Paroxysmal atrial fibrillation    Essential hypertension    Hyperlipidemia LDL goal <100    Cellulitis and abscess of foot    High alkaline phosphatase level    Osteomyelitis    Onychomycosis    Onychocryptosis    Foot pain, bilateral    Osteomyelitis of foot, right, acute    Cellulitis of right foot    Type 2 diabetes mellitus, with long-term current use of insulin    Class 3 severe obesity due to excess calories with serious comorbidity and body mass index (BMI) of 45.0 to 49.9 in adult    Anxiety disorder, unspecified    Claustrophobia    Dependence on wheelchair    Depression, unspecified    Long term (current) use of anticoagulants    Long term (current) use of oral hypoglycemic drugs    Wound of foot    Non-prs chronic ulcer oth prt r foot limited to brkdwn skin    Orthostatic hypotension    Other chronic osteomyelitis, right ankle and foot    Personal history of nicotine dependence    Thrombocytopenia, unspecified    Unspecified open wound, right foot, initial encounter    Diabetic foot infection    Subacute osteomyelitis of right foot    Right foot pain    Sepsis    Onychomycosis    Foot pain, left    Impaired mobility and ADLs    Absence of toe of right foot    Corns and callosity    Disability of walking    Fracture    Limb swelling    Polyneuropathy    Pressure ulcer, stage 1    Shortness of  breath    Generalized weakness     Past Medical History:   Diagnosis Date    Absence of toe of right foot     Acute osteomyelitis of left calcaneus  8/18/2021    Anxiety and depression     Arthritis     Claustrophobia     Corns and callus     Diabetic ulcer of left heel associated with type 2 DM 8/18/2021    Diabetic ulcer of left heel associated with type 2 DM 7/6/2021    Diabetic ulcer of right midfoot associated with type 2 DM 8/18/2021    Difficulty walking     Essential hypertension 8/31/2021    Hammertoe     Hyperlipidemia LDL goal <100 8/31/2021    Ingrown toenail     Obesity     Paroxysmal atrial fibrillation 8/31/2021    Polyneuropathy     Pressure ulcer, stage 1     Tinea unguium     Type 2 diabetes mellitus with polyneuropathy      Past Surgical History:   Procedure Laterality Date    CYST REMOVAL      center of back; benign    INCISION AND DRAINAGE ABSCESS      back    INCISION AND DRAINAGE LEG Right 12/10/2021    Procedure: INCISION AND DRAINAGE LOWER EXTREMITY;  Surgeon: Ash Leyva DPM;  Location: [facility], [place];  Service: Podiatry;  Laterality: Right;    OTHER SURGICAL HISTORY      Surgical clips left foot    TOE SURGERY Right     Removal of 5th toe    TRANS METATARSAL AMPUTATION Right 12/2/2021    Procedure: AMPUTATION TRANS METATARSAL;  Surgeon: Ash Leyva DPM;  Location: [facility], [place];  Service: Podiatry;  Laterality: Right;    WRIST SURGERY Left     repair of injury     PT Assessment (last 12 hours)       PT Evaluation and Treatment       Row Name 09/20/23 1100          Physical Therapy Time and Intention    Subjective Information fatigue;pain  -DK     Document Type therapy note (daily note)  -DK     Mode of Treatment individual therapy;physical therapy  -DK     Patient Effort fair  -DK     Symptoms Noted During/After Treatment fatigue;increased pain  -DK     Comment Pt c/o pain after moving with cleanup, diarrhea.  -DK       Row Name 09/20/23 1100          Pain     Pretreatment Pain Rating 6/10  -DK     Posttreatment Pain Rating 7/10  -DK     Pain Location - Side/Orientation Bilateral  -DK     Pain Location generalized  -DK     Pain Location - back;hip;knee  -DK     Pain Intervention(s) Distraction;Therapeutic presence  -DK       Row Name 09/20/23 1100          Cognition    Affect/Mental Status (Cognition) confabulatory  -DK     Orientation Status (Cognition) oriented x 4  -DK     Follows Commands (Cognition) WFL  -DK     Cognitive Function WFL  -DK     Personal Safety Interventions nonskid shoes/slippers when out of bed;supervised activity;gait belt  -       Row Name 09/20/23 1100          Motor Skills    Motor Skills --  therapeutic exercises  -DK     Coordination WFL  -DK     Therapeutic Exercise hip;knee;ankle  -DK     Additional Documentation --  Pt declined all transfers due to reports of diarrhea with movement.  -       Row Name 09/20/23 1100          Hip (Therapeutic Exercise)    Hip (Therapeutic Exercise) AAROM (active assistive range of motion)  -DK     Hip AAROM (Therapeutic Exercise) bilateral;flexion;extension;aBduction;aDduction;supine;10 repetitions;2 sets  -       Row Name 09/20/23 1100          Knee (Therapeutic Exercise)    Knee (Therapeutic Exercise) AAROM (active assistive range of motion)  -DK     Knee AAROM (Therapeutic Exercise) bilateral;flexion;extension;supine;10 repetitions;2 sets  -       Row Name 09/20/23 1100          Ankle (Therapeutic Exercise)    Ankle (Therapeutic Exercise) AAROM (active assistive range of motion)  -DK     Ankle AAROM (Therapeutic Exercise) bilateral;dorsiflexion;plantarflexion;supine;10 repetitions;2 sets  -       Row Name             Wound 09/10/23 2345 Left proximal arm Skin Tear    Wound - Properties Group Placement Date: 09/10/23  -BL Placement Time: 2345 -BL Present on Hospital Admission: Y  -BL Side: Left  -BL Orientation: proximal  -BL Location: arm  -BL Primary Wound Type: Skin tear  -BL, old healing skin  tear     Retired Wound - Properties Group Placement Date: 09/10/23  -BL Placement Time: 2345  -BL Present on Hospital Admission: Y  -BL Side: Left  -BL Orientation: proximal  -BL Location: arm  -BL Primary Wound Type: Skin tear  -BL, old healing skin tear     Retired Wound - Properties Group Date first assessed: 09/10/23  -BL Time first assessed: 2345  -BL Present on Hospital Admission: Y  -BL Side: Left  -BL Location: arm  -BL Primary Wound Type: Skin tear  -BL, old healing skin tear       Row Name             Wound 09/10/23 2345 Left posterior foot Diabetic Ulcer    Wound - Properties Group Placement Date: 09/10/23  -BL Placement Time: 2345  -BL Side: Left  -BL Orientation: posterior  -BL Location: foot  -BL Primary Wound Type: Diabetic ulc  -BL    Retired Wound - Properties Group Placement Date: 09/10/23  -BL Placement Time: 2345  -BL Side: Left  -BL Orientation: posterior  -BL Location: foot  -BL Primary Wound Type: Diabetic ulc  -BL    Retired Wound - Properties Group Date first assessed: 09/10/23  -BL Time first assessed: 2345  -BL Side: Left  -BL Location: foot  -BL Primary Wound Type: Diabetic ulc  -BL      Row Name             Wound 09/10/23 2345 Left distal calf Diabetic Ulcer    Wound - Properties Group Placement Date: 09/10/23  -BL Placement Time: 2345  -BL Side: Left  -BL Orientation: distal  -BL Location: calf  -BL Primary Wound Type: Diabetic ulc  -BL    Retired Wound - Properties Group Placement Date: 09/10/23  -BL Placement Time: 2345  -BL Side: Left  -BL Orientation: distal  -BL Location: calf  -BL Primary Wound Type: Diabetic ulc  -BL    Retired Wound - Properties Group Date first assessed: 09/10/23  -BL Time first assessed: 2345  -BL Side: Left  -BL Location: calf  -BL Primary Wound Type: Diabetic ulc  -BL      Row Name             Wound 09/10/23 2345 Right distal calf MASD (Moisture associated skin damage)    Wound - Properties Group Placement Date: 09/10/23  -BL Placement Time: 2345  -BL Side:  Right  -BL Orientation: distal  -BL Location: calf  -BL Primary Wound Type: MASD  -BL    Retired Wound - Properties Group Placement Date: 09/10/23  -BL Placement Time: 2345  -BL Side: Right  -BL Orientation: distal  -BL Location: calf  -BL Primary Wound Type: MASD  -BL    Retired Wound - Properties Group Date first assessed: 09/10/23  -BL Time first assessed: 2345  -BL Side: Right  -BL Location: calf  -BL Primary Wound Type: MASD  -BL      Row Name             Wound 09/10/23 2345 Right anterior hip MASD (Moisture associated skin damage)    Wound - Properties Group Placement Date: 09/10/23  -BL Placement Time: 2345  -BL Present on Hospital Admission: Y  -BL Side: Right  -BL Orientation: anterior  -BL Location: hip  -BL Primary Wound Type: MASD  -BL    Retired Wound - Properties Group Placement Date: 09/10/23  -BL Placement Time: 2345  -BL Present on Hospital Admission: Y  -BL Side: Right  -BL Orientation: anterior  -BL Location: hip  -BL Primary Wound Type: MASD  -BL    Retired Wound - Properties Group Date first assessed: 09/10/23  -BL Time first assessed: 2345  -BL Present on Hospital Admission: Y  -BL Side: Right  -BL Location: hip  -BL Primary Wound Type: MASD  -BL      Row Name             Wound 06/22/21 1133 Left lateral heel    Wound - Properties Group Placement Date: 06/22/21  -FABIOLA Placement Time: 1133  -FABIOLA Present on Hospital Admission: Y  -FABIOLA Side: Left  -FABIOLA Orientation: lateral  -FABIOLA Location: heel  -FABIOLA    Retired Wound - Properties Group Placement Date: 06/22/21  -FABIOLA Placement Time: 1133  -FABIOLA Present on Hospital Admission: Y  -FABIOLA Side: Left  -FABIOLA Orientation: lateral  -FABIOLA Location: heel  -FABIOLA    Retired Wound - Properties Group Date first assessed: 06/22/21  -FABIOLA Time first assessed: 1133  -FABIOLA Present on Hospital Admission: Y  -FABIOLA Side: Left  -FABIOLA Location: heel  -FABIOLA      Row Name             Wound 12/02/21 Right anterior foot Incision    Wound - Properties Group Placement Date: 12/02/21  -ES Side: Right  -ES  Orientation: anterior  -ES Location: foot  -ES Primary Wound Type: Incision  -ES    Retired Wound - Properties Group Placement Date: 12/02/21  -ES Side: Right  -ES Orientation: anterior  -ES Location: foot  -ES Primary Wound Type: Incision  -ES    Retired Wound - Properties Group Date first assessed: 12/02/21  -ES Side: Right  -ES Location: foot  -ES Primary Wound Type: Incision  -ES      Row Name             Wound 09/15/23 Right gluteal    Wound - Properties Group Placement Date: 09/15/23  -NH Side: Right  -NH Location: gluteal  -NH    Retired Wound - Properties Group Placement Date: 09/15/23  -NH Side: Right  -NH Location: gluteal  -NH    Retired Wound - Properties Group Date first assessed: 09/15/23  -NH Side: Right  -NH Location: gluteal  -NH      Row Name 09/20/23 1100          Plan of Care Review    Plan of Care Reviewed With patient  -DK     Progress no change  -DK       Row Name 09/20/23 1100          Positioning and Restraints    Pre-Treatment Position in bed  -DK     Post Treatment Position bed  -DK     In Bed supine;call light within reach;encouraged to call for assist;exit alarm on;side rails up x3;legs elevated;heels elevated  -DK       Row Name 09/20/23 1100          Therapy Assessment/Plan (PT)    Rehab Potential (PT) fair, will monitor progress closely  -DK     Criteria for Skilled Interventions Met (PT) skilled treatment is necessary  -DK     Therapy Frequency (PT) daily  -DK     Problem List (PT) problems related to;balance;mobility;range of motion (ROM);strength;pain  -DK     Activity Limitations Related to Problem List (PT) unable to ambulate safely;unable to transfer safely  -DK       Row Name 09/20/23 1100          Progress Summary (PT)    Progress Toward Functional Goals (PT) progress toward functional goals is gradual  -DK               User Key  (r) = Recorded By, (t) = Taken By, (c) = Cosigned By      Initials Name Provider Type    Sarah Bernstein, RN Registered Nurse    HERB Guevara  Dottie, RONNIE Registered Nurse    Mckenna Landaverde PTA Physical Therapist Assistant    Marlen Vaughn RN Registered Nurse    Katlyn Garcia RN Registered Nurse                    Physical Therapy Education       Title: PT OT SLP Therapies (Done)       Topic: Physical Therapy (Done)       Point: Mobility training (Done)       Learning Progress Summary             Patient Acceptance, E, VU,DU by RF at 9/18/2023 1537    Acceptance, E, VU,DU by RF at 9/17/2023 1515    Acceptance, E, VU,DU by RF at 9/16/2023 1804    Acceptance, E, VU by AV at 9/13/2023 1059    Acceptance, E,TB, VU by  at 9/12/2023 1308                         Point: Home exercise program (Done)       Learning Progress Summary             Patient Acceptance, E, VU,DU by RF at 9/18/2023 1537    Acceptance, E, VU,DU by RF at 9/17/2023 1515    Acceptance, E, VU,DU by RF at 9/16/2023 1804    Acceptance, E, VU by AV at 9/13/2023 1059    Acceptance, E,TB, VU by  at 9/12/2023 1308                         Point: Body mechanics (Done)       Learning Progress Summary             Patient Acceptance, E, VU,DU by RF at 9/18/2023 1537    Acceptance, E, VU,DU by RF at 9/17/2023 1515    Acceptance, E, VU,DU by  at 9/16/2023 1804    Acceptance, E, VU by AV at 9/13/2023 1059    Acceptance, E,TB, VU by  at 9/12/2023 1308                         Point: Precautions (Done)       Learning Progress Summary             Patient Acceptance, E, VU,DU by RF at 9/18/2023 1537    Acceptance, E, VU,DU by RF at 9/17/2023 1515    Acceptance, E, VU,DU by RF at 9/16/2023 1804    Acceptance, E, VU by AV at 9/13/2023 1059    Acceptance, E,TB, VU by  at 9/12/2023 1308                                         User Key       Initials Effective Dates Name Provider Type Discipline    AV 06/16/21 -  Uvaldo Christine OT Occupational Therapist OT     01/19/22 -  Karl Baker, RN Registered Nurse Nurse     08/16/23 -  Gamal Simmons, PT Student PT Student PT                   PT Recommendation and Plan  Planned Therapy Interventions (PT): balance training, bed mobility training, strengthening, transfer training  Therapy Frequency (PT): daily  Progress Summary (PT)  Progress Toward Functional Goals (PT): progress toward functional goals is gradual  Plan of Care Reviewed With: patient  Progress: no change   Outcome Measures       Row Name 09/20/23 1100             How much help from another person do you currently need...    Turning from your back to your side while in flat bed without using bedrails? 3  -DK      Moving from lying on back to sitting on the side of a flat bed without bedrails? 2  -DK      Moving to and from a bed to a chair (including a wheelchair)? 1  -DK      Standing up from a chair using your arms (e.g., wheelchair, bedside chair)? 1  -DK      Climbing 3-5 steps with a railing? 1  -DK      To walk in hospital room? 1  -DK      AM-PAC 6 Clicks Score (PT) 9  -DK         Functional Assessment    Outcome Measure Options AM-PAC 6 Clicks Basic Mobility (PT)  -DK                User Key  (r) = Recorded By, (t) = Taken By, (c) = Cosigned By      Initials Name Provider Type    Mckenna Landaverde PTA Physical Therapist Assistant                     Time Calculation:    PT Charges       Row Name 09/20/23 1103             Time Calculation    PT Received On 09/20/23  -DK      PT Goal Re-Cert Due Date 09/21/23  -DK         Timed Charges    86409 - PT Therapeutic Exercise Minutes 14  -DK      45431 - PT Therapeutic Activity Minutes 3  -DK         Total Minutes    Timed Charges Total Minutes 17  -DK       Total Minutes 17  -DK                User Key  (r) = Recorded By, (t) = Taken By, (c) = Cosigned By      Initials Name Provider Type    Mckenna Landaverde PTA Physical Therapist Assistant                  Therapy Charges for Today       Code Description Service Date Service Provider Modifiers Qty    70613725261 HC PT THER PROC EA 15 MIN 9/20/2023 Mckenna Wong PTA GP 1             PT G-Codes  Outcome Measure Options: AM-PAC 6 Clicks Basic Mobility (PT)  AM-PAC 6 Clicks Score (PT): 9  AM-PAC 6 Clicks Score (OT): 17    Mckenna Wong, PTA  9/20/2023

## 2023-09-20 NOTE — PLAN OF CARE
Goal Outcome Evaluation:  Plan of Care Reviewed With: patient        Progress: no change  Outcome Evaluation: patient is alert and oriented x4 and on room air. pt pleasant throughout shift. pt medicated with prn pain medication per mar. blood glucose monitored. no new issues or concerns at this time.

## 2023-09-21 LAB
GLUCOSE BLDC GLUCOMTR-MCNC: 199 MG/DL (ref 70–99)
GLUCOSE BLDC GLUCOMTR-MCNC: 210 MG/DL (ref 70–99)

## 2023-09-21 PROCEDURE — 82948 REAGENT STRIP/BLOOD GLUCOSE: CPT

## 2023-09-21 PROCEDURE — 97110 THERAPEUTIC EXERCISES: CPT

## 2023-09-21 PROCEDURE — 97164 PT RE-EVAL EST PLAN CARE: CPT

## 2023-09-21 PROCEDURE — 63710000001 INSULIN LISPRO (HUMAN) PER 5 UNITS: Performed by: FAMILY MEDICINE

## 2023-09-21 PROCEDURE — 99232 SBSQ HOSP IP/OBS MODERATE 35: CPT | Performed by: INTERNAL MEDICINE

## 2023-09-21 PROCEDURE — 63710000001 INSULIN DETEMIR PER 5 UNITS: Performed by: INTERNAL MEDICINE

## 2023-09-21 RX ORDER — DIPHENOXYLATE HYDROCHLORIDE AND ATROPINE SULFATE 2.5; .025 MG/1; MG/1
1 TABLET ORAL 4 TIMES DAILY PRN
Status: DISCONTINUED | OUTPATIENT
Start: 2023-09-21 | End: 2023-11-06 | Stop reason: HOSPADM

## 2023-09-21 RX ADMIN — PREGABALIN 50 MG: 25 CAPSULE ORAL at 20:15

## 2023-09-21 RX ADMIN — APIXABAN 5 MG: 5 TABLET, FILM COATED ORAL at 20:15

## 2023-09-21 RX ADMIN — HYDROCODONE BITARTRATE AND ACETAMINOPHEN 1 TABLET: 7.5; 325 TABLET ORAL at 20:15

## 2023-09-21 RX ADMIN — MICONAZOLE NITRATE 1 APPLICATION: 2 POWDER TOPICAL at 10:32

## 2023-09-21 RX ADMIN — APIXABAN 5 MG: 5 TABLET, FILM COATED ORAL at 09:19

## 2023-09-21 RX ADMIN — INSULIN LISPRO 6 UNITS: 100 INJECTION, SOLUTION INTRAVENOUS; SUBCUTANEOUS at 09:19

## 2023-09-21 RX ADMIN — INSULIN LISPRO 6 UNITS: 100 INJECTION, SOLUTION INTRAVENOUS; SUBCUTANEOUS at 20:14

## 2023-09-21 RX ADMIN — HYDROCODONE BITARTRATE AND ACETAMINOPHEN 1 TABLET: 7.5; 325 TABLET ORAL at 13:01

## 2023-09-21 RX ADMIN — INSULIN LISPRO 2 UNITS: 100 INJECTION, SOLUTION INTRAVENOUS; SUBCUTANEOUS at 13:01

## 2023-09-21 RX ADMIN — DIPHENOXYLATE HYDROCHLORIDE AND ATROPINE SULFATE 1 TABLET: 2.5; .025 TABLET ORAL at 10:32

## 2023-09-21 RX ADMIN — Medication 10 ML: at 09:19

## 2023-09-21 RX ADMIN — MICONAZOLE NITRATE 1 APPLICATION: 2 POWDER TOPICAL at 20:51

## 2023-09-21 RX ADMIN — CYANOCOBALAMIN TAB 500 MCG 1000 MCG: 500 TAB at 09:19

## 2023-09-21 RX ADMIN — PREGABALIN 50 MG: 25 CAPSULE ORAL at 09:19

## 2023-09-21 RX ADMIN — INSULIN LISPRO 4 UNITS: 100 INJECTION, SOLUTION INTRAVENOUS; SUBCUTANEOUS at 16:46

## 2023-09-21 RX ADMIN — Medication 10 ML: at 20:51

## 2023-09-21 RX ADMIN — Medication: at 10:32

## 2023-09-21 RX ADMIN — BACLOFEN 10 MG: 10 TABLET ORAL at 06:46

## 2023-09-21 RX ADMIN — HYDROCODONE BITARTRATE AND ACETAMINOPHEN 1 TABLET: 7.5; 325 TABLET ORAL at 05:53

## 2023-09-21 RX ADMIN — ATORVASTATIN CALCIUM 10 MG: 10 TABLET, FILM COATED ORAL at 20:15

## 2023-09-21 RX ADMIN — FAMOTIDINE 40 MG: 20 TABLET, FILM COATED ORAL at 09:19

## 2023-09-21 RX ADMIN — INSULIN DETEMIR 30 UNITS: 100 INJECTION, SOLUTION SUBCUTANEOUS at 20:14

## 2023-09-21 RX ADMIN — INSULIN DETEMIR 30 UNITS: 100 INJECTION, SOLUTION SUBCUTANEOUS at 09:19

## 2023-09-21 RX ADMIN — PREGABALIN 50 MG: 25 CAPSULE ORAL at 16:46

## 2023-09-21 NOTE — THERAPY RE-EVALUATION
Acute Care - Physical Therapy Re-Evaluation and Treatment Note  KEO Dominguez     Patient Name: Jose Shaikh  : 1960  MRN: [id number]  Today's Date: 2023      Visit Dx:     ICD-10-CM ICD-9-CM   1. Generalized weakness  R53.1 780.79   2. Hyponatremia  E87.1 276.1   3. Difficulty in walking  R26.2 719.7   4. Decreased activities of daily living (ADL)  Z78.9 V49.89   5. Difficulty walking  R26.2 719.7     Patient Active Problem List   Diagnosis    Diabetic ulcer of left heel associated with type 2 DM    Acute osteomyelitis of left calcaneus     Diabetic ulcer of left heel associated with type 2 DM    Diabetic ulcer of right midfoot associated with type 2 DM    Paroxysmal atrial fibrillation    Essential hypertension    Hyperlipidemia LDL goal <100    Cellulitis and abscess of foot    High alkaline phosphatase level    Osteomyelitis    Onychomycosis    Onychocryptosis    Foot pain, bilateral    Osteomyelitis of foot, right, acute    Cellulitis of right foot    Type 2 diabetes mellitus, with long-term current use of insulin    Class 3 severe obesity due to excess calories with serious comorbidity and body mass index (BMI) of 45.0 to 49.9 in adult    Anxiety disorder, unspecified    Claustrophobia    Dependence on wheelchair    Depression, unspecified    Long term (current) use of anticoagulants    Long term (current) use of oral hypoglycemic drugs    Wound of foot    Non-prs chronic ulcer oth prt r foot limited to brkdwn skin    Orthostatic hypotension    Other chronic osteomyelitis, right ankle and foot    Personal history of nicotine dependence    Thrombocytopenia, unspecified    Unspecified open wound, right foot, initial encounter    Diabetic foot infection    Subacute osteomyelitis of right foot    Right foot pain    Sepsis    Onychomycosis    Foot pain, left    Impaired mobility and ADLs    Absence of toe of right foot    Corns and callosity    Disability of walking    Fracture    Limb swelling     Polyneuropathy    Pressure ulcer, stage 1    Shortness of breath    Generalized weakness     Past Medical History:   Diagnosis Date    Absence of toe of right foot     Acute osteomyelitis of left calcaneus  8/18/2021    Anxiety and depression     Arthritis     Claustrophobia     Corns and callus     Diabetic ulcer of left heel associated with type 2 DM 8/18/2021    Diabetic ulcer of left heel associated with type 2 DM 7/6/2021    Diabetic ulcer of right midfoot associated with type 2 DM 8/18/2021    Difficulty walking     Essential hypertension 8/31/2021    Hammertoe     Hyperlipidemia LDL goal <100 8/31/2021    Ingrown toenail     Obesity     Paroxysmal atrial fibrillation 8/31/2021    Polyneuropathy     Pressure ulcer, stage 1     Tinea unguium     Type 2 diabetes mellitus with polyneuropathy      Past Surgical History:   Procedure Laterality Date    CYST REMOVAL      center of back; benign    INCISION AND DRAINAGE ABSCESS      back    INCISION AND DRAINAGE LEG Right 12/10/2021    Procedure: INCISION AND DRAINAGE LOWER EXTREMITY;  Surgeon: Ash Leyva DPM;  Location: [facility] MAIN OR;  Service: Podiatry;  Laterality: Right;    OTHER SURGICAL HISTORY      Surgical clips left foot    TOE SURGERY Right     Removal of 5th toe    TRANS METATARSAL AMPUTATION Right 12/2/2021    Procedure: AMPUTATION TRANS METATARSAL;  Surgeon: Ash Leyva DPM;  Location: [facility] MAIN OR;  Service: Podiatry;  Laterality: Right;    WRIST SURGERY Left     repair of injury     PT Assessment (last 12 hours)       PT Evaluation and Treatment       Row Name 09/21/23 1400          Physical Therapy Time and Intention    Document Type re-evaluation;therapy note (daily note)  -AV     Mode of Treatment individual therapy;physical therapy  -AV       Row Name 09/21/23 1400          Motor Skills    Therapeutic Exercise hip;knee;ankle  -AV       Row Name 09/21/23 1400          Hip (Therapeutic Exercise)    Hip (Therapeutic Exercise)  AAROM (active assistive range of motion);isometric exercises  -AV     Hip AAROM (Therapeutic Exercise) bilateral;flexion;extension;aBduction;aDduction;supine;2 sets;10 repetitions  -AV     Hip Isometrics (Therapeutic Exercise) bilateral;gluteal sets;supine;2 sets;10 repetitions;3 second hold  -AV       Row Name 09/21/23 1400          Knee (Therapeutic Exercise)    Knee (Therapeutic Exercise) isometric exercises  -AV     Knee Isometrics (Therapeutic Exercise) bilateral;quad sets;supine;2 sets;10 repetitions;3 second hold  -AV       Row Name 09/21/23 1400          Ankle (Therapeutic Exercise)    Ankle (Therapeutic Exercise) AROM (active range of motion)  -AV     Ankle AROM (Therapeutic Exercise) bilateral;dorsiflexion;plantarflexion;supine;2 sets;10 repetitions  -AV       Row Name             Wound 09/10/23 2345 Left proximal arm Skin Tear    Wound - Properties Group Placement Date: 09/10/23  -BL Placement Time: 2345 -BL Present on Hospital Admission: Y  -BL Side: Left  -BL Orientation: proximal  -BL Location: arm  -BL Primary Wound Type: Skin tear  -BL, old healing skin tear     Retired Wound - Properties Group Placement Date: 09/10/23  -BL Placement Time: 2345  -BL Present on Hospital Admission: Y  -BL Side: Left  -BL Orientation: proximal  -BL Location: arm  -BL Primary Wound Type: Skin tear  -BL, old healing skin tear     Retired Wound - Properties Group Date first assessed: 09/10/23  -BL Time first assessed: 2345  -BL Present on Hospital Admission: Y  -BL Side: Left  -BL Location: arm  -BL Primary Wound Type: Skin tear  -BL, old healing skin tear       Row Name             Wound 09/10/23 2345 Left posterior foot Diabetic Ulcer    Wound - Properties Group Placement Date: 09/10/23  -BL Placement Time: 2345  -BL Side: Left  -BL Orientation: posterior  -BL Location: foot  -BL Primary Wound Type: Diabetic ulc  -BL    Retired Wound - Properties Group Placement Date: 09/10/23  -BL Placement Time: 2345  -BL Side: Left   -BL Orientation: posterior  -BL Location: foot  -BL Primary Wound Type: Diabetic ulc  -BL    Retired Wound - Properties Group Date first assessed: 09/10/23  -BL Time first assessed: 2345  -BL Side: Left  -BL Location: foot  -BL Primary Wound Type: Diabetic ulc  -BL      Row Name             Wound 09/10/23 2345 Left distal calf Diabetic Ulcer    Wound - Properties Group Placement Date: 09/10/23  -BL Placement Time: 2345  -BL Side: Left  -BL Orientation: distal  -BL Location: calf  -BL Primary Wound Type: Diabetic ulc  -BL    Retired Wound - Properties Group Placement Date: 09/10/23  -BL Placement Time: 2345  -BL Side: Left  -BL Orientation: distal  -BL Location: calf  -BL Primary Wound Type: Diabetic ulc  -BL    Retired Wound - Properties Group Date first assessed: 09/10/23  -BL Time first assessed: 2345  -BL Side: Left  -BL Location: calf  -BL Primary Wound Type: Diabetic ulc  -BL      Row Name             Wound 09/10/23 2345 Right distal calf MASD (Moisture associated skin damage)    Wound - Properties Group Placement Date: 09/10/23  -BL Placement Time: 2345  -BL Side: Right  -BL Orientation: distal  -BL Location: calf  -BL Primary Wound Type: MASD  -BL    Retired Wound - Properties Group Placement Date: 09/10/23  -BL Placement Time: 2345  -BL Side: Right  -BL Orientation: distal  -BL Location: calf  -BL Primary Wound Type: MASD  -BL    Retired Wound - Properties Group Date first assessed: 09/10/23  -BL Time first assessed: 2345  -BL Side: Right  -BL Location: calf  -BL Primary Wound Type: MASD  -BL      Row Name             Wound 09/10/23 2345 Right anterior hip MASD (Moisture associated skin damage)    Wound - Properties Group Placement Date: 09/10/23  -BL Placement Time: 2345  -BL Present on Hospital Admission: Y  -BL Side: Right  -BL Orientation: anterior  -BL Location: hip  -BL Primary Wound Type: MASD  -BL    Retired Wound - Properties Group Placement Date: 09/10/23  -BL Placement Time: 2345  -BL Present on  Hospital Admission: Y  -BL Side: Right  -BL Orientation: anterior  -BL Location: hip  -BL Primary Wound Type: MASD  -BL    Retired Wound - Properties Group Date first assessed: 09/10/23  -BL Time first assessed: 2345  -BL Present on Hospital Admission: Y  -BL Side: Right  -BL Location: hip  -BL Primary Wound Type: MASD  -BL      Row Name             Wound 06/22/21 1133 Left lateral heel    Wound - Properties Group Placement Date: 06/22/21  -FABIOLA Placement Time: 1133  -FABIOLA Present on Hospital Admission: Y  -FABIOLA Side: Left  -FABIOLA Orientation: lateral  -FABIOLA Location: heel  -FABIOLA    Retired Wound - Properties Group Placement Date: 06/22/21  -FABIOLA Placement Time: 1133  -FABIOLA Present on Hospital Admission: Y  -FABIOLA Side: Left  -FABIOLA Orientation: lateral  -FABIOLA Location: heel  -FABIOLA    Retired Wound - Properties Group Date first assessed: 06/22/21  -FABIOLA Time first assessed: 1133  -FABIOLA Present on Hospital Admission: Y  -FABIOLA Side: Left  -FABIOLA Location: heel  -FABIOLA      Row Name             Wound 12/02/21 Right anterior foot Incision    Wound - Properties Group Placement Date: 12/02/21  -ES Side: Right  -ES Orientation: anterior  -ES Location: foot  -ES Primary Wound Type: Incision  -ES    Retired Wound - Properties Group Placement Date: 12/02/21  -ES Side: Right  -ES Orientation: anterior  -ES Location: foot  -ES Primary Wound Type: Incision  -ES    Retired Wound - Properties Group Date first assessed: 12/02/21  -ES Side: Right  -ES Location: foot  -ES Primary Wound Type: Incision  -ES      Row Name             Wound 09/15/23 Right gluteal    Wound - Properties Group Placement Date: 09/15/23  -NH Side: Right  -NH Location: gluteal  -NH    Retired Wound - Properties Group Placement Date: 09/15/23  -NH Side: Right  -NH Location: gluteal  -NH    Retired Wound - Properties Group Date first assessed: 09/15/23  -NH Side: Right  -NH Location: gluteal  -NH      Row Name 09/21/23 1400          Progress Summary (PT)    Progress Toward Functional Goals (PT) --   -AV     Daily Progress Summary (PT) Patient making slow progress towards functional mobility goals. All goals established during initial evaluation have been revised based on patient's current functional mobility status. Patient will continue to benefit from skilled PT services to address functional mobility deficits and decrease risk of falls. Continue plan of care for an additional 10 days.  -AV       Row Name 09/21/23 1400          Physical Therapy Goals    Bed Mobility Goal Selection (PT) bed mobility, PT goal 1  -AV     Transfer Goal Selection (PT) transfer, PT goal 1  -AV     Strength Goal Selection (PT) strength, PT goal 1  -AV       Row Name 09/21/23 1431 09/21/23 1400       Bed Mobility Goal 1 (PT)    Activity/Assistive Device (Bed Mobility Goal 1, PT) sit to supine/supine to sit  -AV bed mobility activities, all  -AV    [place] Level/Cues Needed (Bed Mobility Goal 1, PT) minimum assist (75% or more patient effort)  -AV independent  -AV    Time Frame (Bed Mobility Goal 1, PT) 10 days  -AV long term goal (LTG);10 days  -AV    Progress/Outcomes (Bed Mobility Goal 1, PT) new goal  -AV goal not met  -AV      Row Name 09/21/23 1431 09/21/23 1400       Transfer Goal 1 (PT)    Activity/Assistive Device (Transfer Goal 1, PT) sit-to-stand/stand-to-sit;bed-to-chair/chair-to-bed;walker, rolling  -AV transfers, all  -AV    [place] Level/Cues Needed (Transfer Goal 1, PT) minimum assist (75% or more patient effort)  -AV independent  -AV    Time Frame (Transfer Goal 1, PT) 10 days  -AV long term goal (LTG);10 days  -AV    Progress/Outcome (Transfer Goal 1, PT) new goal  -AV goal not met  -AV      Row Name 09/21/23 1431 09/21/23 1400       Strength Goal 1 (PT)    Strength Goal 1 (PT) Patient will demonstrate improvement in gross bilateral lower extremity strength to 3+/5  -AV 5/5 bilateral LE strength  -AV    Time Frame (Strength Goal 1, PT) 10 days  -AV long term goal (LTG);10 days  -AV    Progress/Outcome  (Strength Goal 1, PT) new goal  -AV goal not met  -AV              User Key  (r) = Recorded By, (t) = Taken By, (c) = Cosigned By      Initials Name Provider Type    Sarah Bernstein, RN Registered Nurse    Dottie Foster, RN Registered Nurse    Marlen Vaughn, RN Registered Nurse    Frankie Brunson, PT Physical Therapist    Katlyn Garcia RN Registered Nurse                    Physical Therapy Education       Title: PT OT SLP Therapies (Done)       Topic: Physical Therapy (Done)       Point: Mobility training (Done)       Learning Progress Summary             Patient Acceptance, E, VU,DU by RF at 9/18/2023 1537    Acceptance, E, VU,DU by RF at 9/17/2023 1515    Acceptance, E, VU,DU by RF at 9/16/2023 1804    Acceptance, E, VU by AV at 9/13/2023 1059    Acceptance, E,TB, VU by  at 9/12/2023 1308                         Point: Home exercise program (Done)       Learning Progress Summary             Patient Acceptance, E, VU,DU by RF at 9/18/2023 1537    Acceptance, E, VU,DU by RF at 9/17/2023 1515    Acceptance, E, VU,DU by RF at 9/16/2023 1804    Acceptance, E, VU by AV at 9/13/2023 1059    Acceptance, E,TB, VU by  at 9/12/2023 1308                         Point: Body mechanics (Done)       Learning Progress Summary             Patient Acceptance, E, VU,DU by RF at 9/18/2023 1537    Acceptance, E, VU,DU by RF at 9/17/2023 1515    Acceptance, E, VU,DU by RF at 9/16/2023 1804    Acceptance, E, VU by AV at 9/13/2023 1059    Acceptance, E,TB, VU by RH at 9/12/2023 1308                         Point: Precautions (Done)       Learning Progress Summary             Patient Acceptance, E, VU,DU by RF at 9/18/2023 1537    Acceptance, E, VU,DU by RF at 9/17/2023 1515    Acceptance, E, VU,DU by RF at 9/16/2023 1804    Acceptance, E, VU by AV at 9/13/2023 1059    Acceptance, E,TB, VU by  at 9/12/2023 7507                                         User Key       Initials Effective Dates Name Provider  Type Discipline    AV 06/16/21 -  Uvaldo Christine OT Occupational Therapist OT    RF 01/19/22 -  Karl Baker, RN Registered Nurse Nurse     08/16/23 -  Gamal Simmons, [place] Student PT Student PT                  PT Recommendation and Plan     Progress Summary (PT)  Progress Toward Functional Goals (PT): progress toward functional goals is fair  Daily Progress Summary (PT): Patient making slow progress towards functional mobility goals. All goals established during initial evaluation have been revised based on patient's current functional mobility status. Patient will continue to benefit from skilled PT services to address functional mobility deficits and decrease risk of falls. Continue plan of care for an additional 10 days.   Outcome Measures       Row Name 09/21/23 1400 09/20/23 1100          How much help from another person do you currently need...    Turning from your back to your side while in flat bed without using bedrails? 2  -AV 3  -DK     Moving from lying on back to sitting on the side of a flat bed without bedrails? 2  -AV 2  -DK     Moving to and from a bed to a chair (including a wheelchair)? 1  -AV 1  -DK     Standing up from a chair using your arms (e.g., wheelchair, bedside chair)? 1  -AV 1  -DK     Climbing 3-5 steps with a railing? 1  -AV 1  -DK     To walk in hospital room? 1  -AV 1  -DK     AM-PAC 6 Clicks Score (PT) 8  -AV 9  -DK        Functional Assessment    Outcome Measure Options AM-PAC 6 Clicks Basic Mobility (PT)  -AV AM-PAC 6 Clicks Basic Mobility (PT)  -DK               User Key  (r) = Recorded By, (t) = Taken By, (c) = Cosigned By      Initials Name Provider Type    DK Mckenna Wong, [place] Physical Therapist Assistant    AV Frankie Ospina, PT Physical Therapist                     Time Calculation:    PT Charges       Row Name 09/21/23 3494             Time Calculation    PT Received On 09/21/23  -AV         Timed Charges    11221 - PT Therapeutic Exercise Minutes 15   -AV         Untimed Charges    PT Eval/Re-eval Minutes 10  -AV         Total Minutes    Timed Charges Total Minutes 15  -AV      Untimed Charges Total Minutes 10  -AV       Total Minutes 25  -AV                User Key  (r) = Recorded By, (t) = Taken By, (c) = Cosigned By      Initials Name Provider Type    AV Frankie Ospina, PT Physical Therapist                  Therapy Charges for Today       Code Description Service Date Service Provider Modifiers Qty    59270764881 HC PT THER PROC EA 15 MIN 9/21/2023 Frankie Ospina, PT GP 1    49296368048 HC PT RE-EVAL ESTABLISHED PLAN 2 9/21/2023 Frankie Ospina, PT GP 1            PT G-Codes  Outcome Measure Options: AM-PAC 6 Clicks Basic Mobility (PT)  AM-PAC 6 Clicks Score (PT): 8  AM-PAC 6 Clicks Score (OT): 17    Frankie Ospina PT  9/21/2023

## 2023-09-21 NOTE — PLAN OF CARE
Goal Outcome Evaluation:  Plan of Care Reviewed With: patient        Progress: no change  Outcome Evaluation: Patient remains alert and oriented x4. Medicated with PRN pain and diarrhea medication. Wound and skin care performed as ordered. Blood glucose monitored. No new issues at this time.

## 2023-09-21 NOTE — THERAPY TREATMENT NOTE
Patient Name: Jose Shaikh  : 1960    MRN: [id number]                              Today's Date: 2023       Admit Date: 9/10/2023    Visit Dx:     ICD-10-CM ICD-9-CM   1. Generalized weakness  R53.1 780.79   2. Hyponatremia  E87.1 276.1   3. Difficulty in walking  R26.2 719.7   4. Decreased activities of daily living (ADL)  Z78.9 V49.89     Patient Active Problem List   Diagnosis    Diabetic ulcer of left heel associated with type 2 DM    Acute osteomyelitis of left calcaneus     Diabetic ulcer of left heel associated with type 2 DM    Diabetic ulcer of right midfoot associated with type 2 DM    Paroxysmal atrial fibrillation    Essential hypertension    Hyperlipidemia LDL goal <100    Cellulitis and abscess of foot    High alkaline phosphatase level    Osteomyelitis    Onychomycosis    Onychocryptosis    Foot pain, bilateral    Osteomyelitis of foot, right, acute    Cellulitis of right foot    Type 2 diabetes mellitus, with long-term current use of insulin    Class 3 severe obesity due to excess calories with serious comorbidity and body mass index (BMI) of 45.0 to 49.9 in adult    Anxiety disorder, unspecified    Claustrophobia    Dependence on wheelchair    Depression, unspecified    Long term (current) use of anticoagulants    Long term (current) use of oral hypoglycemic drugs    Wound of foot    Non-prs chronic ulcer oth prt r foot limited to brkdwn skin    Orthostatic hypotension    Other chronic osteomyelitis, right ankle and foot    Personal history of nicotine dependence    Thrombocytopenia, unspecified    Unspecified open wound, right foot, initial encounter    Diabetic foot infection    Subacute osteomyelitis of right foot    Right foot pain    Sepsis    Onychomycosis    Foot pain, left    Impaired mobility and ADLs    Absence of toe of right foot    Corns and callosity    Disability of walking    Fracture    Limb swelling    Polyneuropathy    Pressure ulcer, stage 1    Shortness of breath     Generalized weakness     Past Medical History:   Diagnosis Date    Absence of toe of right foot     Acute osteomyelitis of left calcaneus  8/18/2021    Anxiety and depression     Arthritis     Claustrophobia     Corns and callus     Diabetic ulcer of left heel associated with type 2 DM 8/18/2021    Diabetic ulcer of left heel associated with type 2 DM 7/6/2021    Diabetic ulcer of right midfoot associated with type 2 DM 8/18/2021    Difficulty walking     Essential hypertension 8/31/2021    Hammertoe     Hyperlipidemia LDL goal <100 8/31/2021    Ingrown toenail     Obesity     Paroxysmal atrial fibrillation 8/31/2021    Polyneuropathy     Pressure ulcer, stage 1     Tinea unguium     Type 2 diabetes mellitus with polyneuropathy      Past Surgical History:   Procedure Laterality Date    CYST REMOVAL      center of back; benign    INCISION AND DRAINAGE ABSCESS      back    INCISION AND DRAINAGE LEG Right 12/10/2021    Procedure: INCISION AND DRAINAGE LOWER EXTREMITY;  Surgeon: Ash Leyva DPM;  Location: [facility];  Service: Podiatry;  Laterality: Right;    OTHER SURGICAL HISTORY      Surgical clips left foot    TOE SURGERY Right     Removal of 5th toe    TRANS METATARSAL AMPUTATION Right 12/2/2021    Procedure: AMPUTATION TRANS METATARSAL;  Surgeon: Ahs Leyva DPM;  Location: [facility] OR;  Service: Podiatry;  Laterality: Right;    WRIST SURGERY Left     repair of injury      General Information       Row Name 09/21/23 0901          OT Time and Intention    Document Type therapy note (daily note)  -AV     Mode of Treatment individual therapy;occupational therapy  -AV       Row Name 09/21/23 0901          General Information    Existing Precautions/Restrictions fall  WBAT (order 9/13/2023)  -AV       Row Name 09/21/23 0901          Cognition    Orientation Status (Cognition) --  Alert.  Able to perform therapeutic exercises with minimal cues and demonstration.  -AV       Row Name  09/21/23 0901          Safety Issues, Functional Mobility    Impairments Affecting Function (Mobility) balance;endurance/activity tolerance;cognition  -AV               User Key  (r) = Recorded By, (t) = Taken By, (c) = Cosigned By      Initials Name Provider Type    Uvaldo Hardy OT Occupational Therapist                     Mobility/ADL's    No documentation.                  Obj/Interventions       Row Name 09/21/23 0902          Shoulder (Therapeutic Exercise)    Shoulder (Therapeutic Exercise) strengthening exercise  -AV     Shoulder Strengthening (Therapeutic Exercise) bilateral;flexion;horizontal aBduction/aDduction;1 lb free weight;20 repititions  -AV       Row Name 09/21/23 0902          Elbow/Forearm (Therapeutic Exercise)    Elbow/Forearm (Therapeutic Exercise) strengthening exercise  -AV     Elbow/Forearm Strengthening (Therapeutic Exercise) bilateral;flexion;extension;supination;pronation;1 lb free weight;20 repititions  -AV       Row Name 09/21/23 0902          Motor Skills    Therapeutic Exercise shoulder;elbow/forearm  Performed in high Fowlers/on room air  -AV               User Key  (r) = Recorded By, (t) = Taken By, (c) = Cosigned By      Initials Name Provider Type    Uvaldo Hardy OT Occupational Therapist                   Goals/Plan    No documentation.                  Clinical Impression       [facility] Name 09/21/23 0903          Pain Scale: FACES Pre/Post-Treatment    Pain: FACES Scale, Pretreatment 2-->hurts little bit  -AV     Posttreatment Pain Rating 2-->hurts little bit  -AV     Pain Location generalized  -AV       [facility] Name 09/21/23 0903          Plan of Care Review    Outcome Evaluation Patient performed upper extremity therapeutic exercises to improve endurance/activity tolerance needed to support ADLs.  Continued OT is indicated to remediate/compensate for deficits to maximize independence.  -AV       Row Name 09/21/23 0903          Vital Signs    O2 Delivery Pre Treatment room  air  -AV     O2 Delivery Intra Treatment room air  -AV     O2 Delivery Post Treatment room air  -AV               User Key  (r) = Recorded By, (t) = Taken By, (c) = Cosigned By      Initials Name Provider Type    Uvaldo Hardy OT Occupational Therapist                   Outcome Measures       Row Name 09/21/23 0903          How much help from another is currently needed...    Putting on and taking off regular lower body clothing? 2  -AV     Bathing (including washing, rinsing, and drying) 2  -AV     Toileting (which includes using toilet bed pan or urinal) 2  -AV     Putting on and taking off regular upper body clothing 3  -AV     Taking care of personal grooming (such as brushing teeth) 4  -AV     Eating meals 4  -AV     AM-PAC 6 Clicks Score (OT) 17  -AV       Row Name 09/21/23 0749 09/20/23 4116       How much help from another person do you currently need...    Turning from your back to your side while in flat bed without using bedrails? 3  -MP 3  -HA    Moving from lying on back to sitting on the side of a flat bed without bedrails? 3  -MP 2  -HA    Moving to and from a bed to a chair (including a wheelchair)? 1  -MP 1  -HA    Standing up from a chair using your arms (e.g., wheelchair, bedside chair)? 1  -MP 1  -HA    Climbing 3-5 steps with a railing? 1  -MP 1  -HA    To walk in hospital room? 1  -MP 1  -HA    AM-PAC 6 Clicks Score (PT) 10  -MP 9  -HA    Highest level of mobility 4 --> Transferred to chair/commode  -MP 3 --> Sat at edge of bed  -HA      Row Name 09/21/23 0903          Optimal Instrument    Bending/Stooping 5  -AV     Standing 4  -AV     Reaching 1  -AV               User Key  (r) = Recorded By, (t) = Taken By, (c) = Cosigned By      Initials Name Provider Type    Uvaldo Hardy OT Occupational Therapist    Kay Velarde, RN Registered Nurse    Gina Bower RNA Registered Nurse                    Occupational Therapy Education       Title: PT OT SLP Therapies (Done)        Topic: Occupational Therapy (Done)       Point: ADL training (Done)       Description:   Instruct learner(s) on proper safety adaptation and remediation techniques during self care or transfers.   Instruct in proper use of assistive devices.                  Learning Progress Summary             Patient Acceptance, E, VU,DU by RF at 9/18/2023 1537    Acceptance, E, VU,DU by RF at 9/17/2023 1515    Acceptance, E, VU,DU by RF at 9/16/2023 1804    Acceptance, E, VU by AV at 9/13/2023 1059                         Point: Home exercise program (Done)       Description:   Instruct learner(s) on appropriate technique for monitoring, assisting and/or progressing therapeutic exercises/activities.                  Learning Progress Summary             Patient Acceptance, E, VU,DU by RF at 9/18/2023 1537    Acceptance, E, VU,DU by RF at 9/17/2023 1515    Acceptance, E, VU,DU by RF at 9/16/2023 1804    Acceptance, E, VU by AV at 9/13/2023 1059                         Point: Precautions (Done)       Description:   Instruct learner(s) on prescribed precautions during self-care and functional transfers.                  Learning Progress Summary             Patient Acceptance, E, VU,DU by RF at 9/18/2023 1537    Acceptance, E, VU,DU by RF at 9/17/2023 1515    Acceptance, E, VU,DU by RF at 9/16/2023 1804    Acceptance, E, VU by AV at 9/13/2023 1059                         Point: Body mechanics (Done)       Description:   Instruct learner(s) on proper positioning and spine alignment during self-care, functional mobility activities and/or exercises.                  Learning Progress Summary             Patient Acceptance, E, VU,DU by RF at 9/18/2023 1537    Acceptance, E, VU,DU by RF at 9/17/2023 1515    Acceptance, E, VU,DU by RF at 9/16/2023 1804    Acceptance, E, VU by AV at 9/13/2023 1059                                         User Key       Initials Effective Dates Name Provider Type Discipline    AV 06/16/21 -  Uvaldo Christine  OT Occupational Therapist OT     01/19/22 -  Karl Baker, RN Registered Nurse Nurse                  OT Recommendation and Plan  Planned Therapy Interventions (OT): activity tolerance training, BADL retraining, functional balance retraining, occupation/activity based interventions, patient/caregiver education/training, transfer/mobility retraining  Therapy Frequency (OT): 5 times/wk  Plan of Care Review  Plan of Care Reviewed With: patient  Progress: no change (first session: evaluation)  Outcome Evaluation: Patient performed upper extremity therapeutic exercises to improve endurance/activity tolerance needed to support ADLs.  Continued OT is indicated to remediate/compensate for deficits to maximize independence.     Time Calculation:   Evaluation Complexity (OT)  Review Occupational Profile/Medical/Therapy History Complexity: expanded/moderate complexity  Assessment, Occupational Performance/Identification of Deficit Complexity: 3-5 performance deficits  Clinical Decision Making Complexity (OT): problem focused assessment/low complexity  Overall Complexity of Evaluation (OT): low complexity     Time Calculation- OT       Row Name 09/21/23 0904             Time Calculation- OT    OT Received On 09/21/23  -AV      OT Goal Re-Cert Due Date 09/22/23  -AV         Timed Charges    12491 - OT Therapeutic Exercise Minutes 10  -AV         Total Minutes    Timed Charges Total Minutes 10  -AV       Total Minutes 10  -AV                User Key  (r) = Recorded By, (t) = Taken By, (c) = Cosigned By      Initials Name Provider Type    AV Uvaldo Christine OT Occupational Therapist                  Therapy Charges for Today       Code Description Service Date Service Provider Modifiers Qty    [id number]  OT THER PROC EA 15 MIN 9/21/2023 Uvaldo Christine OT GO 1                 Uvaldo Christine OT  9/21/2023

## 2023-09-21 NOTE — PLAN OF CARE
Goal Outcome Evaluation:  Plan of Care Reviewed With: patient        Progress: no change  Outcome Evaluation: patient is alert and oriented x4 and on room air. patient medicated with prn pain medication per mar. skin care performed per mar. blood gluocose monitored throughout shift. no new concerns at this time.

## 2023-09-21 NOTE — PROGRESS NOTES
[facility]   Hospitalist Progress Note  Date: 2023  Patient Name: Jose Shaikh  : 1960  MRN: [id number]  Date of admission: 9/10/2023  Room/Bed: [facility]/      Subjective   Subjective     Chief Complaint: Weakness    Summary:Jose Shaikh is a 62 y.o. male with multiple medical problems [facility].  He stated that he could barely move or get out of his car so EMS was called.  He said that due to nonweightbearing restrictions he was unable to work on his lower extremity weakness at rehab and was unable to care for himself upon discharge.    Interval Followup: Overnight patient complaining of left hip and left knee pain, patient states that he had spoken with orthopedic surgery about receiving injections in the past, patient feels like this is a big component of him being able to ambulate successfully.      Objective   Objective     Vitals:   Temp:  [97.7 °F (36.5 °C)-99 °F (37.2 °C)] 98.1 °F (36.7 °C)  Heart Rate:  [80-89] 80  Resp:  [16-18] 18  BP: (113-133)/(56-66) 118/56    Physical Exam   GEN: No acute distress  HEENT: Moist mucous membranes  LUNGS: Equal chest rise bilaterally  CARDIAC: Regular rate and rhythm  NEURO: Moving all 4 extremities spontaneously  SKIN: No obvious breakdown      Result Review    Result Review:  I have personally reviewed these results:  [x]  Laboratory      Lab 23  0930   WBC 3.78   HEMOGLOBIN 11.4*   HEMATOCRIT 36.6*   PLATELETS 105*   MCV 81.0           Lab 23  0930   SODIUM 138   POTASSIUM 3.8   CHLORIDE 104   CO2 25.3   ANION GAP 8.7   BUN 8   CREATININE 0.58*   EGFR 108.9   GLUCOSE 212*   CALCIUM 8.8   PHOSPHORUS 4.3           Lab 23  0930   ALBUMIN 3.0*                         Brief Urine Lab Results  (Last result in the past 365 days)        Color   Clarity   Blood   Leuk Est   Nitrite   Protein   CREAT   Urine HCG        230 Dark Yellow   Clear   Negative   Negative   Negative    Negative                 [x]  Microbiology   Microbiology Results (last 10 days)       ** No results found for the last 240 hours. **          [x]  Radiology  No radiology results for the last 7 days  []  EKG/Telemetry   []  Cardiology/Vascular   []  Pathology  []  Old records  []  Other:    Assessment & Plan   Assessment / Plan     Assessment:  Generalized weakness  Diabetes  Low sodium likely a lab error given drastic change this morning  Hypertension  History of atrial fibrillation  Morbid obesity  Debility with wheelchair dependent  Chronic wound    Plan:  Patient placed back in the hospital  Continue PT/OT  Continue current medications  Lomotil added for loose bowel movements  Continue wound care  Vitals reviewed  Continue increased dose of Lyrica for more neuropathic pain control  Consider orthopedic surgery consultation for possible joint injection  Awaiting rehab arrangements.    Reviewed patients labs and imaging, and discussed with patient and nurse at bedside.      DVT prophylaxis:  Medical DVT prophylaxis orders are present.    CODE STATUS:       Electronically signed by Max Mehta MD, 09/21/23, 10:00 AM EDT.

## 2023-09-22 ENCOUNTER — APPOINTMENT (OUTPATIENT)
Dept: INTERVENTIONAL RADIOLOGY/VASCULAR | Facility: HOSPITAL | Age: 63
DRG: 554 | End: 2023-09-22
Payer: MEDICARE

## 2023-09-22 LAB
GLUCOSE BLDC GLUCOMTR-MCNC: 209 MG/DL (ref 70–99)
GLUCOSE BLDC GLUCOMTR-MCNC: 235 MG/DL (ref 70–99)
GLUCOSE BLDC GLUCOMTR-MCNC: 252 MG/DL (ref 70–99)
GLUCOSE BLDC GLUCOMTR-MCNC: 278 MG/DL (ref 70–99)
GLUCOSE BLDC GLUCOMTR-MCNC: 363 MG/DL (ref 70–99)

## 2023-09-22 PROCEDURE — 3E0U33Z INTRODUCTION OF ANTI-INFLAMMATORY INTO JOINTS, PERCUTANEOUS APPROACH: ICD-10-PCS | Performed by: RADIOLOGY

## 2023-09-22 PROCEDURE — 82948 REAGENT STRIP/BLOOD GLUCOSE: CPT

## 2023-09-22 PROCEDURE — 25010000002 METHYLPREDNISOLONE PER 40 MG: Performed by: STUDENT IN AN ORGANIZED HEALTH CARE EDUCATION/TRAINING PROGRAM

## 2023-09-22 PROCEDURE — 0 LIDOCAINE 1 % SOLUTION: Performed by: STUDENT IN AN ORGANIZED HEALTH CARE EDUCATION/TRAINING PROGRAM

## 2023-09-22 PROCEDURE — 25510000001 IOPAMIDOL 61 % SOLUTION: Performed by: INTERNAL MEDICINE

## 2023-09-22 PROCEDURE — 63710000001 INSULIN DETEMIR PER 5 UNITS: Performed by: INTERNAL MEDICINE

## 2023-09-22 PROCEDURE — 63710000001 INSULIN LISPRO (HUMAN) PER 5 UNITS: Performed by: FAMILY MEDICINE

## 2023-09-22 PROCEDURE — 20610 DRAIN/INJ JOINT/BURSA W/O US: CPT | Performed by: STUDENT IN AN ORGANIZED HEALTH CARE EDUCATION/TRAINING PROGRAM

## 2023-09-22 PROCEDURE — 77002 NEEDLE LOCALIZATION BY XRAY: CPT

## 2023-09-22 PROCEDURE — 25010000002 METHYLPREDNISOLONE PER 80 MG: Performed by: INTERNAL MEDICINE

## 2023-09-22 PROCEDURE — 25010000002 BUPIVACAINE (PF) 0.5 % SOLUTION: Performed by: INTERNAL MEDICINE

## 2023-09-22 PROCEDURE — 99232 SBSQ HOSP IP/OBS MODERATE 35: CPT | Performed by: INTERNAL MEDICINE

## 2023-09-22 PROCEDURE — 99221 1ST HOSP IP/OBS SF/LOW 40: CPT | Performed by: STUDENT IN AN ORGANIZED HEALTH CARE EDUCATION/TRAINING PROGRAM

## 2023-09-22 RX ORDER — BUPIVACAINE HYDROCHLORIDE 5 MG/ML
10 INJECTION, SOLUTION EPIDURAL; INTRACAUDAL ONCE
Status: COMPLETED | OUTPATIENT
Start: 2023-09-22 | End: 2023-09-22

## 2023-09-22 RX ORDER — IOPAMIDOL 612 MG/ML
15 INJECTION, SOLUTION INTRATHECAL
Status: COMPLETED | OUTPATIENT
Start: 2023-09-22 | End: 2023-09-22

## 2023-09-22 RX ORDER — LIDOCAINE HYDROCHLORIDE 10 MG/ML
10 INJECTION, SOLUTION INFILTRATION; PERINEURAL ONCE
Status: COMPLETED | OUTPATIENT
Start: 2023-09-22 | End: 2023-09-22

## 2023-09-22 RX ORDER — METHYLPREDNISOLONE ACETATE 40 MG/ML
40 INJECTION, SUSPENSION INTRA-ARTICULAR; INTRALESIONAL; INTRAMUSCULAR; SOFT TISSUE ONCE
Status: COMPLETED | OUTPATIENT
Start: 2023-09-22 | End: 2023-09-22

## 2023-09-22 RX ORDER — LIDOCAINE HYDROCHLORIDE 20 MG/ML
20 INJECTION, SOLUTION INFILTRATION; PERINEURAL ONCE
Status: COMPLETED | OUTPATIENT
Start: 2023-09-22 | End: 2023-09-22

## 2023-09-22 RX ORDER — METHYLPREDNISOLONE ACETATE 80 MG/ML
80 INJECTION, SUSPENSION INTRA-ARTICULAR; INTRALESIONAL; INTRAMUSCULAR; SOFT TISSUE ONCE
Status: COMPLETED | OUTPATIENT
Start: 2023-09-22 | End: 2023-09-22

## 2023-09-22 RX ADMIN — APIXABAN 5 MG: 5 TABLET, FILM COATED ORAL at 08:49

## 2023-09-22 RX ADMIN — BACLOFEN 10 MG: 10 TABLET ORAL at 02:45

## 2023-09-22 RX ADMIN — LIDOCAINE HYDROCHLORIDE 20 ML: 20 INJECTION, SOLUTION INFILTRATION; PERINEURAL at 14:55

## 2023-09-22 RX ADMIN — Medication 10 ML: at 08:49

## 2023-09-22 RX ADMIN — Medication 10 ML: at 22:48

## 2023-09-22 RX ADMIN — PREGABALIN 50 MG: 25 CAPSULE ORAL at 08:49

## 2023-09-22 RX ADMIN — HYDROCODONE BITARTRATE AND ACETAMINOPHEN 1 TABLET: 7.5; 325 TABLET ORAL at 02:49

## 2023-09-22 RX ADMIN — MICONAZOLE NITRATE 1 APPLICATION: 2 POWDER TOPICAL at 08:50

## 2023-09-22 RX ADMIN — INSULIN LISPRO 4 UNITS: 100 INJECTION, SOLUTION INTRAVENOUS; SUBCUTANEOUS at 12:47

## 2023-09-22 RX ADMIN — HYDROCODONE BITARTRATE AND ACETAMINOPHEN 1 TABLET: 7.5; 325 TABLET ORAL at 17:39

## 2023-09-22 RX ADMIN — INSULIN LISPRO 8 UNITS: 100 INJECTION, SOLUTION INTRAVENOUS; SUBCUTANEOUS at 22:47

## 2023-09-22 RX ADMIN — INSULIN LISPRO 4 UNITS: 100 INJECTION, SOLUTION INTRAVENOUS; SUBCUTANEOUS at 08:50

## 2023-09-22 RX ADMIN — HYDROCODONE BITARTRATE AND ACETAMINOPHEN 1 TABLET: 7.5; 325 TABLET ORAL at 22:48

## 2023-09-22 RX ADMIN — HYDROCODONE BITARTRATE AND ACETAMINOPHEN 1 TABLET: 7.5; 325 TABLET ORAL at 08:49

## 2023-09-22 RX ADMIN — METHYLPREDNISOLONE ACETATE 80 MG: 80 INJECTION, SUSPENSION INTRA-ARTICULAR; INTRALESIONAL; INTRAMUSCULAR; SOFT TISSUE at 15:25

## 2023-09-22 RX ADMIN — METHYLPREDNISOLONE ACETATE 40 MG: 40 INJECTION, SUSPENSION INTRA-ARTICULAR; INTRALESIONAL; INTRAMUSCULAR; INTRASYNOVIAL; SOFT TISSUE at 12:37

## 2023-09-22 RX ADMIN — BUPIVACAINE HYDROCHLORIDE 2 ML: 5 INJECTION, SOLUTION EPIDURAL; INTRACAUDAL; PERINEURAL at 15:20

## 2023-09-22 RX ADMIN — ATORVASTATIN CALCIUM 10 MG: 10 TABLET, FILM COATED ORAL at 22:48

## 2023-09-22 RX ADMIN — SENNOSIDES AND DOCUSATE SODIUM 2 TABLET: 50; 8.6 TABLET ORAL at 22:48

## 2023-09-22 RX ADMIN — INSULIN DETEMIR 30 UNITS: 100 INJECTION, SOLUTION SUBCUTANEOUS at 08:50

## 2023-09-22 RX ADMIN — PREGABALIN 50 MG: 25 CAPSULE ORAL at 22:48

## 2023-09-22 RX ADMIN — FAMOTIDINE 40 MG: 20 TABLET, FILM COATED ORAL at 08:49

## 2023-09-22 RX ADMIN — PREGABALIN 50 MG: 25 CAPSULE ORAL at 17:39

## 2023-09-22 RX ADMIN — APIXABAN 5 MG: 5 TABLET, FILM COATED ORAL at 22:48

## 2023-09-22 RX ADMIN — BACLOFEN 10 MG: 10 TABLET ORAL at 19:24

## 2023-09-22 RX ADMIN — LIDOCAINE HYDROCHLORIDE 10 ML: 10 INJECTION, SOLUTION INFILTRATION; PERINEURAL at 12:37

## 2023-09-22 RX ADMIN — IOPAMIDOL 15 ML: 612 INJECTION, SOLUTION INTRATHECAL at 15:03

## 2023-09-22 RX ADMIN — INSULIN DETEMIR 30 UNITS: 100 INJECTION, SOLUTION SUBCUTANEOUS at 22:48

## 2023-09-22 RX ADMIN — Medication: at 12:46

## 2023-09-22 RX ADMIN — INSULIN LISPRO 6 UNITS: 100 INJECTION, SOLUTION INTRAVENOUS; SUBCUTANEOUS at 17:39

## 2023-09-22 RX ADMIN — SODIUM BICARBONATE 1 MEQ: 84 INJECTION INTRAVENOUS at 14:55

## 2023-09-22 RX ADMIN — CYANOCOBALAMIN TAB 500 MCG 1000 MCG: 500 TAB at 08:51

## 2023-09-22 RX ADMIN — MICONAZOLE NITRATE 1 APPLICATION: 2 POWDER TOPICAL at 22:51

## 2023-09-22 NOTE — PROGRESS NOTES
[facility]   Hospitalist Progress Note  Date: 2023  Patient Name: Jose Shaikh  : 1960  MRN: [id number]  Date of admission: 9/10/2023  Room/Bed: [facility]      Subjective   Subjective     Chief Complaint: Weakness    Summary:Jose Shaikh is a 62 y.o. male with multiple medical problems [facility].  He stated that he could barely move or get out of his car so EMS was called.  He said that due to nonweightbearing restrictions he was unable to work on his lower extremity weakness at rehab and was unable to care for himself upon discharge.    Interval Followup: Patient still complaining of left hip and left knee pain, patient rested well overnight    Objective   Objective     Vitals:   Temp:  [97.7 °F (36.5 °C)-98.2 °F (36.8 °C)] 97.9 °F (36.6 °C)  Heart Rate:  [84-89] 89  Resp:  [16-18] 16  BP: (117-132)/(60-70) 117/66    Physical Exam   GEN: No acute distress  HEENT: Moist mucous membranes  LUNGS: Equal chest rise bilaterally  CARDIAC: Regular rate and rhythm  NEURO: Moving all 4 extremities spontaneously  SKIN: No obvious breakdown      Result Review    Result Review:  I have personally reviewed these results:  [x]  Laboratory      Lab 23  0930   WBC 3.78   HEMOGLOBIN 11.4*   HEMATOCRIT 36.6*   PLATELETS 105*   MCV 81.0           Lab 23  0930   SODIUM 138   POTASSIUM 3.8   CHLORIDE 104   CO2 25.3   ANION GAP 8.7   BUN 8   CREATININE 0.58*   EGFR 108.9   GLUCOSE 212*   CALCIUM 8.8   PHOSPHORUS 4.3           Lab 23  0930   ALBUMIN 3.0*                         Brief Urine Lab Results  (Last result in the past 365 days)        Color   Clarity   Blood   Leuk Est   Nitrite   Protein   CREAT   Urine HCG        23 2200 Dark Yellow   Clear   Negative   Negative   Negative   Negative                 [x]  Microbiology   Microbiology Results (last 10 days)       ** No results found for the last 240 hours. **          [x]   Radiology  No radiology results for the last 7 days  []  EKG/Telemetry   []  Cardiology/Vascular   []  Pathology  []  Old records  []  Other:    Assessment & Plan   Assessment / Plan     Assessment:  Generalized weakness  Diabetes  Low sodium likely a lab error given drastic change this morning  Hypertension  History of atrial fibrillation  Morbid obesity  Debility with wheelchair dependent  Chronic wound    Plan:  Patient placed back in the hospital  Continue PT/OT  Continue current medications  Lomotil added for loose bowel movements, continue  Continue wound care as needed  IR consulted for hip injection with steroids and numbing agent given severe osteoarthritis  Vitals reviewed  Continue increased dose of Lyrica for more neuropathic pain control  Consider orthopedic surgery consultation for possible joint injection  Awaiting rehab arrangements.    Reviewed patients labs and imaging, and discussed with patient and nurse at bedside.      DVT prophylaxis:  Medical DVT prophylaxis orders are present.    CODE STATUS:       Electronically signed by Max Mehta MD, 09/22/23, 11:43 AM EDT.

## 2023-09-22 NOTE — DISCHARGE PLACEMENT REQUEST
"Sami Shaikh (62 y.o. Male)       Date of Birth   1960    Social Security Number       Address   [address] [place] [id number]    Home Phone   [phone]    MRN   [id number]       Buddhist   None    Marital Status                               Admission Date   9/10/23    Admission Type   Emergency    Admitting Provider   Levi Finney MD    Attending Provider   Max Mehta MD    Department, Room/Bed   [address], 4027/1       Discharge Date       Discharge Disposition       Discharge Destination                                 Attending Provider: Max Mehta MD    Allergies: Adhesive Tape    Isolation: None   Infection: None   Code Status: CPR    Ht: 188 cm (74\")   Wt: 151 kg (334 lb)    Admission Cmt: None   Principal Problem: Generalized weakness [R53.1]                   Active Insurance as of 9/10/2023       Primary Coverage       Payor Plan Insurance Group Employer/Plan Group    [place] [employer] MEDICARE REPLACEMENT UNITED [facility] DUAL COMPLETE MEDICARE REPLACEMENT [id number]       Payor Plan Address Payor Plan Phone Number Payor Plan Fax Number Effective Dates    [address] [phone]  5/1/2022 - None Entered    [facility] [address]         Subscriber Name Subscriber Birth Date Member ID       SAMI SHAIKH 1960 [id number]               Secondary Coverage       Payor Plan Insurance Group Employer/Plan Group    [place]        Payor Plan Address Payor Plan Phone Number Payor Plan Fax Number Effective Dates    [address] [phone]  6/22/2021 - None Entered    [facility] [address]         Subscriber Name Subscriber Birth Date Member ID       SAMI SHAIKH 1960 [id number]                     Emergency Contacts        (Rel.) Home Phone Work Phone Mobile Phone    RAMONITA SHAIKH JR (Son) -- -- [phone]    MICHELLE SHAIKH (Daughter) -- -- [phone]    Michelle Chandra " (Friend) -- -- [phone]              Emergency Contact Information       Name Relation Home Work Mobile    RAMONITA SHAIKH JR Son   [phone]    MICHELLE SHAIKH Daughter   [phone]    Michelle Chandra Friend   [phone]          Insurance Information                  [facility] MEDICARE REPLACEMENT/[facility] DUAL COMPLETE MEDICARE REPLACEMENT Phone: [phone]    Subscriber: Jose Shaikh Subscriber#: [id number]    Group#: [id number] Precert#: --        [place] Phone: [phone]    Subscriber: Jose Shaikh Subscriber#: [id number]    Group#: -- Precert#: --             History & Physical        Levi Finney MD at 23 0237           [facility]   HISTORY AND PHYSICAL    Patient Name: Jose Shaikh  : 1960  MRN: [id number]  Primary Care Physician:  Delia Cervantes APRN  Date of admission: 9/10/2023    Subjective   Subjective     Chief Complaint: Generalized weakness    HPI:    Jose Shaikh is a 62 y.o. male with past medical history of diabetes with polyneuropathy and foot amputation, hypertension, chronic wounds, morbid obesity, anxiety/depression, and GERD presented via EMS with generalized weakness.  Patient was discharged from [facility] yesterday and stated that he could barely move or get out of his car so EMS was called to bring him to the ED.  Patient states that due to nonweightbearing restrictions that was implemented by wound care during his rehab, physical therapy did not work on strengthening his left lower extremity.  Due to the restriction patient feels as though his rehab was not adequate resulted in him being unable to care for himself upon discharge.  In the ED patient's vitals were all within normal limits on arrival.  Labs showed that he did have significant hyponatremia with remaining labs being relatively unremarkable given his chronic conditions.  When asked he denied any recent fevers, chills, headaches, chest  pain, palpitation, shortness of breath, cough, abdominal pain, nausea, vomiting, diarrhea, constipation, dysuria, hematuria, hematochezia, melena, or anxiety.  Patient was admitted for further evaluation and treatment.    Review of Systems   All systems were reviewed and negative except for: As specified in HPI    Personal History     Past Medical History:   Diagnosis Date    Absence of toe of right foot     Acute osteomyelitis of left calcaneus  8/18/2021    Anxiety and depression     Arthritis     Claustrophobia     Corns and callus     Diabetic ulcer of left heel associated with type 2 DM 8/18/2021    Diabetic ulcer of left heel associated with type 2 DM 7/6/2021    Diabetic ulcer of right midfoot associated with type 2 DM 8/18/2021    Difficulty walking     Essential hypertension 8/31/2021    Hammertoe     Hyperlipidemia LDL goal <100 8/31/2021    Ingrown toenail     Obesity     Paroxysmal atrial fibrillation 8/31/2021    Polyneuropathy     Pressure ulcer, stage 1     Tinea unguium     Type 2 diabetes mellitus with polyneuropathy        Past Surgical History:   Procedure Laterality Date    CYST REMOVAL      center of back; benign    INCISION AND DRAINAGE ABSCESS      back    INCISION AND DRAINAGE LEG Right 12/10/2021    Procedure: INCISION AND DRAINAGE LOWER EXTREMITY;  Surgeon: Ash Leyva DPM;  Location: [facility];  Service: Podiatry;  Laterality: Right;    OTHER SURGICAL HISTORY      Surgical clips left foot    TOE SURGERY Right     Removal of 5th toe    TRANS METATARSAL AMPUTATION Right 12/2/2021    Procedure: AMPUTATION TRANS METATARSAL;  Surgeon: Ash Leyva DPM;  Location: [facility];  Service: Podiatry;  Laterality: Right;    WRIST SURGERY Left     repair of injury       Family History: family history includes Cancer in his father; Heart disease in his brother, father, and mother. Otherwise pertinent FHx was reviewed and not pertinent to current issue.    Social History:   reports that he quit smoking about 32 years ago. His smoking use included cigarettes. He has never used smokeless tobacco. He reports current alcohol use. He reports current drug use. Drug: Marijuana.    Home Medications:  Dulaglutide, HYDROcodone-acetaminophen, Insulin Lispro (1 Unit Dial), acetaminophen, apixaban, digoxin, insulin detemir, metFORMIN, metoprolol succinate XL, naloxone, pregabalin, and simvastatin      Allergies:  Allergies   Allergen Reactions    Adhesive Tape Rash       Objective   Objective     Vitals:   Temp:  [97.6 °F (36.4 °C)-98 °F (36.7 °C)] 98 °F (36.7 °C)  Heart Rate:  [] 83  Resp:  [18] 18  BP: ()/() 97/44  Physical Exam    Constitutional: Awake, alert   Eyes: PERRLA, sclerae anicteric, no conjunctival injection   HENT: NCAT, mucous membranes moist   Neck: Supple, no thyromegaly, no lymphadenopathy, trachea midline   Respiratory: Clear to auscultation bilaterally, nonlabored respirations    Cardiovascular: RRR, no murmurs, rubs, or gallops, palpable pedal pulses bilaterally   Gastrointestinal: Positive bowel sounds, soft, nontender, nondistended   Musculoskeletal: Transmetatarsal amputation, no clubbing or cyanosis to extremities   Psychiatric: Appropriate affect, cooperative   Neurologic: Oriented x 3, strength symmetric in all extremities, Cranial Nerves grossly intact to confrontation, speech clear   Skin: Healing lower extremity wounds    Result Review    Result Review:  I have personally reviewed the results from the time of this admission to 9/11/2023 02:37 EDT and agree with these findings:  [x]  Laboratory list / accordion  []  Microbiology  []  Radiology  [x]  EKG/Telemetry   []  Cardiology/Vascular   []  Pathology  []  Old records  []  Other:  Most notable findings include: Hyponatremia      Assessment & Plan   Assessment / Plan     Brief Patient Summary:  Jose Shaikh is a 62 y.o. male who ith past medical history of diabetes with polyneuropathy and foot  amputation, hypertension, chronic wounds, morbid obesity, anxiety/depression, and GERD presented via EMS with generalized weakness    Active Hospital Problems:  Active Hospital Problems    Diagnosis     **Generalized weakness     Type 2 diabetes mellitus, with long-term current use of insulin     Class 3 severe obesity due to excess calories with serious comorbidity and body mass index (BMI) of 45.0 to 49.9 in adult     Dependence on wheelchair     Foot pain, bilateral     Paroxysmal atrial fibrillation     Essential hypertension     Diabetic ulcer of left heel associated with type 2 DM      Plan:     Generalized weakness  -Admit to medical floor  -Discharged from inpatient rehab yesterday  -Patient unable to perform ADLs.   -Patient morbidly obese  -Fall precautions  -PT OT  -Wound care  -Case management consulted for likely placement  -Supportive care    Hyponatremia  -Patient euvolemic, asymptomatic  -Glucose mildly elevated  -Superimposed hypochloremia  -EKG reviewed  -Urinalysis pending  -Admits to diarrhea  -NS IVF for now  -Further work-up per clinical course  -Follow labs    DM2  -ISS  -Titrate as needed    HTN  -Currently well controlled  -PRN BP meds  -Resume home meds when available  -Titrate if needed    Hx A-fib  -Resume home AC when available    Hx CKD  Hx CAD  Hx Morbid Obesity  Hx Chronic Wound: Wound care consulted  Debility/Wheelchair dependence     GI ppx  DVT ppx      DVT prophylaxis:  No DVT prophylaxis order currently exists.    CODE STATUS:       Admission Status:  I believe this patient meets inpatient status.      Electronically signed by Levi Finney MD, 23, 2:37 AM EDT.    Electronically signed by Levi Finney MD at 23 0654          Physician Progress Notes (most recent note)        Max Mehta MD at 23 1142           [facility] Progress Note  Date: 2023  Patient Name: Jose Shaikh  : 1960  MRN: [id number]  Date of  admission: 9/10/2023  Room/Bed: 40271      Subjective   Subjective     Chief Complaint: Weakness    Summary:Jose Shaikh is a 62 y.o. male with multiple medical problems [facility] to [facility].  He stated that he could barely move or get out of his car so EMS was called.  He said that due to nonweightbearing restrictions he was unable to work on his lower extremity weakness at rehab and was unable to care for himself upon discharge.    Interval Followup: Patient still complaining of left hip and left knee pain, patient rested well overnight    Objective   Objective     Vitals:   Temp:  [97.7 °F (36.5 °C)-98.2 °F (36.8 °C)] 97.9 °F (36.6 °C)  Heart Rate:  [84-89] 89  Resp:  [16-18] 16  BP: (117-132)/(60-70) 117/66    Physical Exam   GEN: No acute distress  HEENT: Moist mucous membranes  LUNGS: Equal chest rise bilaterally  CARDIAC: Regular rate and rhythm  NEURO: Moving all 4 extremities spontaneously  SKIN: No obvious breakdown      Result Review    Result Review:  I have personally reviewed these results:  [x]  Laboratory      Lab 09/18/23  0930   WBC 3.78   HEMOGLOBIN 11.4*   HEMATOCRIT 36.6*   PLATELETS 105*   MCV 81.0           Lab 09/18/23  0930   SODIUM 138   POTASSIUM 3.8   CHLORIDE 104   CO2 25.3   ANION GAP 8.7   BUN 8   CREATININE 0.58*   EGFR 108.9   GLUCOSE 212*   CALCIUM 8.8   PHOSPHORUS 4.3           Lab 09/18/23  0930   ALBUMIN 3.0*                         Brief Urine Lab Results  (Last result in the past 365 days)        Color   Clarity   Blood   Leuk Est   Nitrite   Protein   CREAT   Urine HCG        09/11/23 2200 Dark Yellow   Clear   Negative   Negative   Negative   Negative                 [x]  Microbiology   Microbiology Results (last 10 days)       ** No results found for the last 240 hours. **          [x]  Radiology  No radiology results for the last 7 days  []  EKG/Telemetry   []  Cardiology/Vascular   []  Pathology  []  Old records  []   Other:    Assessment & Plan   Assessment / Plan     Assessment:  Generalized weakness  Diabetes  Low sodium likely a lab error given drastic change this morning  Hypertension  History of atrial fibrillation  Morbid obesity  Debility with wheelchair dependent  Chronic wound    Plan:  Patient placed back in the hospital  Continue PT/OT  Continue current medications  Lomotil added for loose bowel movements, continue  Continue wound care as needed  IR consulted for hip injection with steroids and numbing agent given severe osteoarthritis  Vitals reviewed  Continue increased dose of Lyrica for more neuropathic pain control  Consider orthopedic surgery consultation for possible joint injection  Awaiting rehab arrangements.    Reviewed patients labs and imaging, and discussed with patient and nurse at bedside.      DVT prophylaxis:  Medical DVT prophylaxis orders are present.    CODE STATUS:       Electronically signed by Max Mehta MD, 23, 11:43 AM EDT.                                                Electronically signed by Max Mehta MD at 23 1143          Physical Therapy Notes (most recent note)        Frankie Ospina, PT at 23 1435  Version 1 of 1         Acute Care - Physical Therapy Re-Evaluation and Treatment Note  KEO Dominguez     Patient Name: Jose Shaikh  : 1960  MRN: [id number]  Today's Date: 2023      Visit Dx:     ICD-10-CM ICD-9-CM   1. Generalized weakness  R53.1 780.79   2. Hyponatremia  E87.1 276.1   3. Difficulty in walking  R26.2 719.7   4. Decreased activities of daily living (ADL)  Z78.9 V49.89   5. Difficulty walking  R26.2 719.7     Patient Active Problem List   Diagnosis    Diabetic ulcer of left heel associated with type 2 DM    Acute osteomyelitis of left calcaneus     Diabetic ulcer of left heel associated with type 2 DM    Diabetic ulcer of right midfoot associated with type 2 DM    Paroxysmal atrial fibrillation    Essential hypertension     Hyperlipidemia LDL goal <100    Cellulitis and abscess of foot    High alkaline phosphatase level    Osteomyelitis    Onychomycosis    Onychocryptosis    Foot pain, bilateral    Osteomyelitis of foot, right, acute    Cellulitis of right foot    Type 2 diabetes mellitus, with long-term current use of insulin    Class 3 severe obesity due to excess calories with serious comorbidity and body mass index (BMI) of 45.0 to 49.9 in adult    Anxiety disorder, unspecified    Claustrophobia    Dependence on wheelchair    Depression, unspecified    Long term (current) use of anticoagulants    Long term (current) use of oral hypoglycemic drugs    Wound of foot    Non-prs chronic ulcer oth prt r foot limited to brkdwn skin    Orthostatic hypotension    Other chronic osteomyelitis, right ankle and foot    Personal history of nicotine dependence    Thrombocytopenia, unspecified    Unspecified open wound, right foot, initial encounter    Diabetic foot infection    Subacute osteomyelitis of right foot    Right foot pain    Sepsis    Onychomycosis    Foot pain, left    Impaired mobility and ADLs    Absence of toe of right foot    Corns and callosity    Disability of walking    Fracture    Limb swelling    Polyneuropathy    Pressure ulcer, stage 1    Shortness of breath    Generalized weakness     Past Medical History:   Diagnosis Date    Absence of toe of right foot     Acute osteomyelitis of left calcaneus  8/18/2021    Anxiety and depression     Arthritis     Claustrophobia     Corns and callus     Diabetic ulcer of left heel associated with type 2 DM 8/18/2021    Diabetic ulcer of left heel associated with type 2 DM 7/6/2021    Diabetic ulcer of right midfoot associated with type 2 DM 8/18/2021    Difficulty walking     Essential hypertension 8/31/2021    Hammertoe     Hyperlipidemia LDL goal <100 8/31/2021    Ingrown toenail     Obesity     Paroxysmal atrial fibrillation 8/31/2021    Polyneuropathy     Pressure ulcer, stage 1      Tinea unguium     Type 2 diabetes mellitus with polyneuropathy      Past Surgical History:   Procedure Laterality Date    CYST REMOVAL      center of back; benign    INCISION AND DRAINAGE ABSCESS      back    INCISION AND DRAINAGE LEG Right 12/10/2021    Procedure: INCISION AND DRAINAGE LOWER EXTREMITY;  Surgeon: Ash Leyva DPM;  Location: [facility] MAIN OR;  Service: Podiatry;  Laterality: Right;    OTHER SURGICAL HISTORY      Surgical clips left foot    TOE SURGERY Right     Removal of 5th toe    TRANS METATARSAL AMPUTATION Right 12/2/2021    Procedure: AMPUTATION TRANS METATARSAL;  Surgeon: Ash Leyva DPM;  Location: [facility] MAIN OR;  Service: Podiatry;  Laterality: Right;    WRIST SURGERY Left     repair of injury     PT Assessment (last 12 hours)       PT Evaluation and Treatment       Row Name 09/21/23 1400          Physical Therapy Time and Intention    Document Type re-evaluation;therapy note (daily note)  -AV     Mode of Treatment individual therapy;physical therapy  -AV       Row Name 09/21/23 1400          Motor Skills    Therapeutic Exercise hip;knee;ankle  -AV       Row Name 09/21/23 1400          Hip (Therapeutic Exercise)    Hip (Therapeutic Exercise) AAROM (active assistive range of motion);isometric exercises  -AV     Hip AAROM (Therapeutic Exercise) bilateral;flexion;extension;aBduction;aDduction;supine;2 sets;10 repetitions  -AV     Hip Isometrics (Therapeutic Exercise) bilateral;gluteal sets;supine;2 sets;10 repetitions;3 second hold  -AV       Row Name 09/21/23 1400          Knee (Therapeutic Exercise)    Knee (Therapeutic Exercise) isometric exercises  -AV     Knee Isometrics (Therapeutic Exercise) bilateral;quad sets;supine;2 sets;10 repetitions;3 second hold  -AV       Row Name 09/21/23 1400          Ankle (Therapeutic Exercise)    Ankle (Therapeutic Exercise) AROM (active range of motion)  -AV     Ankle AROM (Therapeutic Exercise)  bilateral;dorsiflexion;plantarflexion;supine;2 sets;10 repetitions  -AV       Row Name             Wound 09/10/23 2345 Left proximal arm Skin Tear    Wound - Properties Group Placement Date: 09/10/23  -BL Placement Time: 2345  -BL Present on Hospital Admission: Y  -BL Side: Left  -BL Orientation: proximal  -BL Location: arm  -BL Primary Wound Type: Skin tear  -BL, old healing skin tear     Retired Wound - Properties Group Placement Date: 09/10/23  -BL Placement Time: 2345  -BL Present on Hospital Admission: Y  -BL Side: Left  -BL Orientation: proximal  -BL Location: arm  -BL Primary Wound Type: Skin tear  -BL, old healing skin tear     Retired Wound - Properties Group Date first assessed: 09/10/23  -BL Time first assessed: 2345  -BL Present on Hospital Admission: Y  -BL Side: Left  -BL Location: arm  -BL Primary Wound Type: Skin tear  -BL, old healing skin tear       Row Name             Wound 09/10/23 2345 Left posterior foot Diabetic Ulcer    Wound - Properties Group Placement Date: 09/10/23  -BL Placement Time: 2345  -BL Side: Left  -BL Orientation: posterior  -BL Location: foot  -BL Primary Wound Type: Diabetic ulc  -BL    Retired Wound - Properties Group Placement Date: 09/10/23  -BL Placement Time: 2345  -BL Side: Left  -BL Orientation: posterior  -BL Location: foot  -BL Primary Wound Type: Diabetic ulc  -BL    Retired Wound - Properties Group Date first assessed: 09/10/23  -BL Time first assessed: 2345  -BL Side: Left  -BL Location: foot  -BL Primary Wound Type: Diabetic ulc  -BL      Row Name             Wound 09/10/23 2345 Left distal calf Diabetic Ulcer    Wound - Properties Group Placement Date: 09/10/23  -BL Placement Time: 2345  -BL Side: Left  -BL Orientation: distal  -BL Location: calf  -BL Primary Wound Type: Diabetic ulc  -BL    Retired Wound - Properties Group Placement Date: 09/10/23  -BL Placement Time: 2345  -BL Side: Left  -BL Orientation: distal  -BL Location: calf  -BL Primary Wound Type:  Diabetic ulc  -BL    Retired Wound - Properties Group Date first assessed: 09/10/23  -BL Time first assessed: 2345  -BL Side: Left  -BL Location: calf  -BL Primary Wound Type: Diabetic ulc  -BL      Row Name             Wound 09/10/23 2345 Right distal calf MASD (Moisture associated skin damage)    Wound - Properties Group Placement Date: 09/10/23  -BL Placement Time: 2345  -BL Side: Right  -BL Orientation: distal  -BL Location: calf  -BL Primary Wound Type: MASD  -BL    Retired Wound - Properties Group Placement Date: 09/10/23  -BL Placement Time: 2345  -BL Side: Right  -BL Orientation: distal  -BL Location: calf  -BL Primary Wound Type: MASD  -BL    Retired Wound - Properties Group Date first assessed: 09/10/23  -BL Time first assessed: 2345  -BL Side: Right  -BL Location: calf  -BL Primary Wound Type: MASD  -BL      Row Name             Wound 09/10/23 2345 Right anterior hip MASD (Moisture associated skin damage)    Wound - Properties Group Placement Date: 09/10/23  -BL Placement Time: 2345  -BL Present on Hospital Admission: Y  -BL Side: Right  -BL Orientation: anterior  -BL Location: hip  -BL Primary Wound Type: MASD  -BL    Retired Wound - Properties Group Placement Date: 09/10/23  -BL Placement Time: 2345  -BL Present on Hospital Admission: Y  -BL Side: Right  -BL Orientation: anterior  -BL Location: hip  -BL Primary Wound Type: MASD  -BL    Retired Wound - Properties Group Date first assessed: 09/10/23  -BL Time first assessed: 2345  -BL Present on Hospital Admission: Y  -BL Side: Right  -BL Location: hip  -BL Primary Wound Type: MASD  -BL      Row Name             Wound 06/22/21 1133 Left lateral heel    Wound - Properties Group Placement Date: 06/22/21  -FABIOLA Placement Time: 1133  -FABIOLA Present on Hospital Admission: Y  -FABIOLA Side: Left  -FABIOLA Orientation: lateral  -FABIOLA Location: heel  -FABIOLA    Retired Wound - Properties Group Placement Date: 06/22/21  -FABIOLA Placement Time: 1133  -FABIOLA Present on Hospital Admission: Y   -FABIOLA Side: Left  -FABIOLA Orientation: lateral  -FABIOLA Location: heel  -FABIOLA    Retired Wound - Properties Group Date first assessed: 06/22/21  -FABIOLA Time first assessed: 1133  -FABIOLA Present on Hospital Admission: Y  -FABIOLA Side: Left  -FABIOLA Location: heel  -FABIOLA      Row Name             Wound 12/02/21 Right anterior foot Incision    Wound - Properties Group Placement Date: 12/02/21  -ES Side: Right  -ES Orientation: anterior  -ES Location: foot  -ES Primary Wound Type: Incision  -ES    Retired Wound - Properties Group Placement Date: 12/02/21  -ES Side: Right  -ES Orientation: anterior  -ES Location: foot  -ES Primary Wound Type: Incision  -ES    Retired Wound - Properties Group Date first assessed: 12/02/21  -ES Side: Right  -ES Location: foot  -ES Primary Wound Type: Incision  -ES      Row Name             Wound 09/15/23 Right gluteal    Wound - Properties Group Placement Date: 09/15/23  -NH Side: Right  -NH Location: gluteal  -NH    Retired Wound - Properties Group Placement Date: 09/15/23  -NH Side: Right  -NH Location: gluteal  -NH    Retired Wound - Properties Group Date first assessed: 09/15/23  -NH Side: Right  -NH Location: gluteal  -NH      Row Name 09/21/23 1400          Progress Summary (PT)    Progress Toward Functional Goals (PT) --  -AV     Daily Progress Summary (PT) Patient making slow progress towards functional mobility goals. All goals established during initial evaluation have been revised based on patient's current functional mobility status. Patient will continue to benefit from skilled PT services to address functional mobility deficits and decrease risk of falls. Continue plan of care for an additional 10 days.  -AV       Row Name 09/21/23 1400          Physical Therapy Goals    Bed Mobility Goal Selection (PT) bed mobility, PT goal 1  -AV     Transfer Goal Selection (PT) transfer, PT goal 1  -AV     Strength Goal Selection (PT) strength, PT goal 1  -AV       Row Name 09/21/23 1431 09/21/23 1400       Bed  Mobility Goal 1 (PT)    Activity/Assistive Device (Bed Mobility Goal 1, PT) sit to supine/supine to sit  -AV bed mobility activities, all  -AV    Darke Level/Cues Needed (Bed Mobility Goal 1, PT) minimum assist (75% or more patient effort)  -AV independent  -AV    Time Frame (Bed Mobility Goal 1, PT) 10 days  -AV long term goal (LTG);10 days  -AV    Progress/Outcomes (Bed Mobility Goal 1, PT) new goal  -AV goal not met  -AV      Row Name 09/21/23 1431 09/21/23 1400       Transfer Goal 1 (PT)    Activity/Assistive Device (Transfer Goal 1, PT) sit-to-stand/stand-to-sit;bed-to-chair/chair-to-bed;walker, rolling  -AV transfers, all  -AV    Darke Level/Cues Needed (Transfer Goal 1, PT) minimum assist (75% or more patient effort)  -AV independent  -AV    Time Frame (Transfer Goal 1, PT) 10 days  -AV long term goal (LTG);10 days  -AV    Progress/Outcome (Transfer Goal 1, PT) new goal  -AV goal not met  -AV      Row Name 09/21/23 1431 09/21/23 1400       Strength Goal 1 (PT)    Strength Goal 1 (PT) Patient will demonstrate improvement in gross bilateral lower extremity strength to 3+/5  -AV 5/5 bilateral LE strength  -AV    Time Frame (Strength Goal 1, PT) 10 days  -AV long term goal (LTG);10 days  -AV    Progress/Outcome (Strength Goal 1, PT) new goal  -AV goal not met  -AV              User Key  (r) = Recorded By, (t) = Taken By, (c) = Cosigned By      Initials Name Provider Type    Sarah Bernstein, RN Registered Nurse    Dottie Foster, RN Registered Nurse    Marlen Vaughn RN Registered Nurse    Frankie Brunson PT Physical Therapist    Katlyn Garcia RN Registered Nurse                    Physical Therapy Education       Title: PT OT SLP Therapies (Done)       Topic: Physical Therapy (Done)       Point: Mobility training (Done)       Learning Progress Summary             Patient Acceptance, E, VU,DU by RF at 9/18/2023 1537    Acceptance, E, VU,DU by RF at 9/17/2023 1513     Acceptance, E, VU,DU by RF at 9/16/2023 1804    Acceptance, E, VU by AV at 9/13/2023 1059    Acceptance, E,TB, VU by  at 9/12/2023 1308                         Point: Home exercise program (Done)       Learning Progress Summary             Patient Acceptance, E, VU,DU by RF at 9/18/2023 1537    Acceptance, E, VU,DU by RF at 9/17/2023 1515    Acceptance, E, VU,DU by RF at 9/16/2023 1804    Acceptance, E, VU by AV at 9/13/2023 1059    Acceptance, E,TB, VU by  at 9/12/2023 1308                         Point: Body mechanics (Done)       Learning Progress Summary             Patient Acceptance, E, VU,DU by RF at 9/18/2023 1537    Acceptance, E, VU,DU by RF at 9/17/2023 1515    Acceptance, E, VU,DU by RF at 9/16/2023 1804    Acceptance, E, VU by AV at 9/13/2023 1059    Acceptance, E,TB, VU by  at 9/12/2023 1308                         Point: Precautions (Done)       Learning Progress Summary             Patient Acceptance, E, VU,DU by RF at 9/18/2023 1537    Acceptance, E, VU,DU by RF at 9/17/2023 1515    Acceptance, E, VU,DU by RF at 9/16/2023 1804    Acceptance, E, VU by AV at 9/13/2023 1059    Acceptance, E,TB, VU by  at 9/12/2023 1308                                         User Key       Initials Effective Dates Name Provider Type Discipline     06/16/21 -  Uvaldo Christine OT Occupational Therapist OT     01/19/22 -  Karl Baker, RN Registered Nurse Nurse     08/16/23 -  Gamal Simmons, MERLIN Student PT Student PT                  PT Recommendation and Plan     Progress Summary (PT)  Progress Toward Functional Goals (PT): progress toward functional goals is fair  Daily Progress Summary (PT): Patient making slow progress towards functional mobility goals. All goals established during initial evaluation have been revised based on patient's current functional mobility status. Patient will continue to benefit from skilled PT services to address functional mobility deficits and decrease risk of falls.  Continue plan of care for an additional 10 days.   Outcome Measures       Row Name 09/21/23 1400 09/20/23 1100          How much help from another person do you currently need...    Turning from your back to your side while in flat bed without using bedrails? 2  -AV 3  -DK     Moving from lying on back to sitting on the side of a flat bed without bedrails? 2  -AV 2  -DK     Moving to and from a bed to a chair (including a wheelchair)? 1  -AV 1  -DK     Standing up from a chair using your arms (e.g., wheelchair, bedside chair)? 1  -AV 1  -DK     Climbing 3-5 steps with a railing? 1  -AV 1  -DK     To walk in hospital room? 1  -AV 1  -DK     AM-PAC 6 Clicks Score (PT) 8  -AV 9  -DK        Functional Assessment    Outcome Measure Options AM-PAC 6 Clicks Basic Mobility (PT)  -AV AM-PAC 6 Clicks Basic Mobility (PT)  -DK               User Key  (r) = Recorded By, (t) = Taken By, (c) = Cosigned By      Initials Name Provider Type    Mckenna Landaverde, PTA Physical Therapist Assistant    AV Frankie Ospina, PT Physical Therapist                     Time Calculation:    PT Charges       Row Name 09/21/23 1428             Time Calculation    PT Received On 09/21/23  -AV         Timed Charges    64390 - PT Therapeutic Exercise Minutes 15  -AV         Untimed Charges    PT Eval/Re-eval Minutes 10  -AV         Total Minutes    Timed Charges Total Minutes 15  -AV      Untimed Charges Total Minutes 10  -AV       Total Minutes 25  -AV                User Key  (r) = Recorded By, (t) = Taken By, (c) = Cosigned By      Initials Name Provider Type    AV Frankie Ospina, PT Physical Therapist                  Therapy Charges for Today       Code Description Service Date Service Provider Modifiers Qty    88035445113 HC PT THER PROC EA 15 MIN 9/21/2023 Frankie Ospina, PT GP 1    84618519073 HC PT RE-EVAL ESTABLISHED PLAN 2 9/21/2023 Frankie Ospina, PT GP 1            PT G-Codes  Outcome Measure Options: AM-PAC 6 Clicks Basic  Mobility (PT)  AM-PAC 6 Clicks Score (PT): 8  AM-PAC 6 Clicks Score (OT): 17    Frankie Ospina, PT  2023      Electronically signed by Frankie Ospina, PT at 23 1436          Occupational Therapy Notes (most recent note)        Uvaldo Christine, OT at 23 0904          Patient Name: Jose Shaikh  : 1960    MRN: [id number]                              Today's Date: 2023       Admit Date: 9/10/2023    Visit Dx:     ICD-10-CM ICD-9-CM   1. Generalized weakness  R53.1 780.79   2. Hyponatremia  E87.1 276.1   3. Difficulty in walking  R26.2 719.7   4. Decreased activities of daily living (ADL)  Z78.9 V49.89     Patient Active Problem List   Diagnosis    Diabetic ulcer of left heel associated with type 2 DM    Acute osteomyelitis of left calcaneus     Diabetic ulcer of left heel associated with type 2 DM    Diabetic ulcer of right midfoot associated with type 2 DM    Paroxysmal atrial fibrillation    Essential hypertension    Hyperlipidemia LDL goal <100    Cellulitis and abscess of foot    High alkaline phosphatase level    Osteomyelitis    Onychomycosis    Onychocryptosis    Foot pain, bilateral    Osteomyelitis of foot, right, acute    Cellulitis of right foot    Type 2 diabetes mellitus, with long-term current use of insulin    Class 3 severe obesity due to excess calories with serious comorbidity and body mass index (BMI) of 45.0 to 49.9 in adult    Anxiety disorder, unspecified    Claustrophobia    Dependence on wheelchair    Depression, unspecified    Long term (current) use of anticoagulants    Long term (current) use of oral hypoglycemic drugs    Wound of foot    Non-prs chronic ulcer oth prt r foot limited to brkdwn skin    Orthostatic hypotension    Other chronic osteomyelitis, right ankle and foot    Personal history of nicotine dependence    Thrombocytopenia, unspecified    Unspecified open wound, right foot, initial encounter    Diabetic foot infection    Subacute  osteomyelitis of right foot    Right foot pain    Sepsis    Onychomycosis    Foot pain, left    Impaired mobility and ADLs    Absence of toe of right foot    Corns and callosity    Disability of walking    Fracture    Limb swelling    Polyneuropathy    Pressure ulcer, stage 1    Shortness of breath    Generalized weakness     Past Medical History:   Diagnosis Date    Absence of toe of right foot     Acute osteomyelitis of left calcaneus  8/18/2021    Anxiety and depression     Arthritis     Claustrophobia     Corns and callus     Diabetic ulcer of left heel associated with type 2 DM 8/18/2021    Diabetic ulcer of left heel associated with type 2 DM 7/6/2021    Diabetic ulcer of right midfoot associated with type 2 DM 8/18/2021    Difficulty walking     Essential hypertension 8/31/2021    Hammertoe     Hyperlipidemia LDL goal <100 8/31/2021    Ingrown toenail     Obesity     Paroxysmal atrial fibrillation 8/31/2021    Polyneuropathy     Pressure ulcer, stage 1     Tinea unguium     Type 2 diabetes mellitus with polyneuropathy      Past Surgical History:   Procedure Laterality Date    CYST REMOVAL      center of back; benign    INCISION AND DRAINAGE ABSCESS      back    INCISION AND DRAINAGE LEG Right 12/10/2021    Procedure: INCISION AND DRAINAGE LOWER EXTREMITY;  Surgeon: Ash Leyva DPM;  Location: [facility];  Service: Podiatry;  Laterality: Right;    OTHER SURGICAL HISTORY      Surgical clips left foot    TOE SURGERY Right     Removal of 5th toe    TRANS METATARSAL AMPUTATION Right 12/2/2021    Procedure: AMPUTATION TRANS METATARSAL;  Surgeon: Ash Leyva DPM;  Location: [facility] OR;  Service: Podiatry;  Laterality: Right;    WRIST SURGERY Left     repair of injury      General Information       Row Name 09/21/23 0901          OT Time and Intention    Document Type therapy note (daily note)  -AV     Mode of Treatment individual therapy;occupational therapy  -AV       Row Name  09/21/23 0901          General Information    Existing Precautions/Restrictions fall  WBAT (order 9/13/2023)  -AV       [facility] Name 09/21/23 0901          Cognition    Orientation Status (Cognition) --  Alert.  Able to perform therapeutic exercises with minimal cues and demonstration.  -       Row Name 09/21/23 0901          Safety Issues, Functional Mobility    Impairments Affecting Function (Mobility) balance;endurance/activity tolerance;cognition  -AV               User Key  (r) = Recorded By, (t) = Taken By, (c) = Cosigned By      Initials Name Provider Type    Uvaldo Hardy OT Occupational Therapist                     Mobility/ADL's    No documentation.                  Obj/Interventions       [facility] Name 09/21/23 0902          Shoulder (Therapeutic Exercise)    Shoulder (Therapeutic Exercise) strengthening exercise  -AV     Shoulder Strengthening (Therapeutic Exercise) bilateral;flexion;horizontal aBduction/aDduction;1 lb free weight;20 repititions  -[facility] Name 09/21/23 0902          Elbow/Forearm (Therapeutic Exercise)    Elbow/Forearm (Therapeutic Exercise) strengthening exercise  -AV     Elbow/Forearm Strengthening (Therapeutic Exercise) bilateral;flexion;extension;supination;pronation;1 lb free weight;20 repititions  -[facility] Name 09/21/23 0902          Motor Skills    Therapeutic Exercise shoulder;elbow/forearm  Performed in high Fowlers/on room air  -AV               User Key  (r) = Recorded By, (t) = Taken By, (c) = Cosigned By      Initials Name Provider Type    Uvaldo Hardy OT Occupational Therapist                   Goals/Plan    No documentation.                  Clinical Impression       [facility] Name 09/21/23 0903          Pain Scale: FACES Pre/Post-Treatment    Pain: FACES Scale, Pretreatment 2-->hurts little bit  -AV     Posttreatment Pain Rating 2-->hurts little bit  -AV     Pain Location generalized  -[facility] Name 09/21/23 0903          Plan of Care Review    Outcome  Evaluation Patient performed upper extremity therapeutic exercises to improve endurance/activity tolerance needed to support ADLs.  Continued OT is indicated to remediate/compensate for deficits to maximize independence.  -AV       Row Name 09/21/23 0903          Vital Signs    O2 Delivery Pre Treatment room air  -AV     O2 Delivery Intra Treatment room air  -AV     O2 Delivery Post Treatment room air  -AV               User Key  (r) = Recorded By, (t) = Taken By, (c) = Cosigned By      Initials Name Provider Type    Uvaldo Hardy OT Occupational Therapist                   Outcome Measures       Row Name 09/21/23 0903          How much help from another is currently needed...    Putting on and taking off regular lower body clothing? 2  -AV     Bathing (including washing, rinsing, and drying) 2  -AV     Toileting (which includes using toilet bed pan or urinal) 2  -AV     Putting on and taking off regular upper body clothing 3  -AV     Taking care of personal grooming (such as brushing teeth) 4  -AV     Eating meals 4  -AV     AM-PAC 6 Clicks Score (OT) 17  -AV       Row Name 09/21/23 0749 09/20/23 1041       How much help from another person do you currently need...    Turning from your back to your side while in flat bed without using bedrails? 3  -MP 3  -HA    Moving from lying on back to sitting on the side of a flat bed without bedrails? 3  -MP 2  -HA    Moving to and from a bed to a chair (including a wheelchair)? 1  -MP 1  -HA    Standing up from a chair using your arms (e.g., wheelchair, bedside chair)? 1  -MP 1  -HA    Climbing 3-5 steps with a railing? 1  -MP 1  -HA    To walk in hospital room? 1  -MP 1  -HA    AM-PAC 6 Clicks Score (PT) 10  -MP 9  -HA    Highest level of mobility 4 --> Transferred to chair/commode  -MP 3 --> Sat at edge of bed  -HA      Row Name 09/21/23 0903          Optimal Instrument    Bending/Stooping 5  -AV     Standing 4  -AV     Reaching 1  -AV               User Key  (r) =  Recorded By, (t) = Taken By, (c) = Cosigned By      Initials Name Provider Type    Uvaldo Hardy, BEKAH Occupational Therapist    Kay Velarde, RN Registered Nurse    Gina Bower RNA Registered Nurse                    Occupational Therapy Education       Title: PT OT SLP Therapies (Done)       Topic: Occupational Therapy (Done)       Point: ADL training (Done)       Description:   Instruct learner(s) on proper safety adaptation and remediation techniques during self care or transfers.   Instruct in proper use of assistive devices.                  Learning Progress Summary             Patient Acceptance, E, VU,DU by RF at 9/18/2023 1537    Acceptance, E, VU,DU by RF at 9/17/2023 1515    Acceptance, E, VU,DU by RF at 9/16/2023 1804    Acceptance, E, VU by AV at 9/13/2023 1059                         Point: Home exercise program (Done)       Description:   Instruct learner(s) on appropriate technique for monitoring, assisting and/or progressing therapeutic exercises/activities.                  Learning Progress Summary             Patient Acceptance, E, VU,DU by RF at 9/18/2023 1537    Acceptance, E, VU,DU by RF at 9/17/2023 1515    Acceptance, E, VU,DU by RF at 9/16/2023 1804    Acceptance, E, VU by AV at 9/13/2023 1059                         Point: Precautions (Done)       Description:   Instruct learner(s) on prescribed precautions during self-care and functional transfers.                  Learning Progress Summary             Patient Acceptance, E, VU,DU by RF at 9/18/2023 1537    Acceptance, E, VU,DU by RF at 9/17/2023 1515    Acceptance, E, VU,DU by RF at 9/16/2023 1804    Acceptance, E, VU by AV at 9/13/2023 1059                         Point: Body mechanics (Done)       Description:   Instruct learner(s) on proper positioning and spine alignment during self-care, functional mobility activities and/or exercises.                  Learning Progress Summary             Patient Acceptance, E,  VU,DU by RF at 9/18/2023 1537    Acceptance, E, VU,DU by RF at 9/17/2023 1515    Acceptance, E, VU,DU by RF at 9/16/2023 1804    Acceptance, E, VU by AV at 9/13/2023 1059                                         User Key       Initials Effective Dates Name Provider Type Discipline     06/16/21 -  Uvaldo Christine OT Occupational Therapist OT     01/19/22 -  Karl Baker, RN Registered Nurse Nurse                  OT Recommendation and Plan  Planned Therapy Interventions (OT): activity tolerance training, BADL retraining, functional balance retraining, occupation/activity based interventions, patient/caregiver education/training, transfer/mobility retraining  Therapy Frequency (OT): 5 times/wk  Plan of Care Review  Plan of Care Reviewed With: patient  Progress: no change (first session: evaluation)  Outcome Evaluation: Patient performed upper extremity therapeutic exercises to improve endurance/activity tolerance needed to support ADLs.  Continued OT is indicated to remediate/compensate for deficits to maximize independence.     Time Calculation:   Evaluation Complexity (OT)  Review Occupational Profile/Medical/Therapy History Complexity: expanded/moderate complexity  Assessment, Occupational Performance/Identification of Deficit Complexity: 3-5 performance deficits  Clinical Decision Making Complexity (OT): problem focused assessment/low complexity  Overall Complexity of Evaluation (OT): low complexity     Time Calculation- OT       Row Name 09/21/23 0904             Time Calculation- OT    OT Received On 09/21/23  -AV      OT Goal Re-Cert Due Date 09/22/23  -AV         Timed Charges    30990 - OT Therapeutic Exercise Minutes 10  -AV         Total Minutes    Timed Charges Total Minutes 10  -AV       Total Minutes 10  -AV                User Key  (r) = Recorded By, (t) = Taken By, (c) = Cosigned By      Initials Name Provider Type    AV Uvaldo Christine OT Occupational Therapist                  Therapy Charges for  Today       Code Description Service Date Service Provider Modifiers Qty    53277740496 HC OT THER PROC EA 15 MIN 9/21/2023 Uvaldo Christine OT GO 1                 Uvaldo Christine OT  9/21/2023    Electronically signed by Uvaldo Christine OT at 09/21/23 0905

## 2023-09-22 NOTE — PLAN OF CARE
Goal Outcome Evaluation:  Plan of Care Reviewed With: patient           Outcome Evaluation: Patient A/Ox4. PRN Norco and Baclofen administered as ordered. On room air. Wound care and skin care completed as ordered. No other issues/needs at this time.

## 2023-09-22 NOTE — CONSULTS
[facility]   Consult Note    Patient Name: Jose Shaikh  : 1960  MRN: [id number]  Primary Care Physician:  Delia Cervantes APRN  Referring Physician: No ref. provider found  Date of admission: 9/10/2023    Subjective   Subjective     Reason for Consult/ Chief Complaint: Left hip and knee pain    HPI:  Jose Shaikh is a 62 y.o. male with advanced left hip and knee arthritis.  He was admitted with weakness and has been unable to care for himself given his pain.  He is morbidly obese and has a history of poorly controlled diabetes.  He has a right transmetatarsal amputation and a left diabetic heel wound.  No acute injuries to the knee or hip.  He currently states the knee pain is worse of the hip.    Review of Systems   14 Point ROS is negative except as noted above.     Personal History     Past Medical History:   Diagnosis Date    Absence of toe of right foot     Acute osteomyelitis of left calcaneus  2021    Anxiety and depression     Arthritis     Claustrophobia     Corns and callus     Diabetic ulcer of left heel associated with type 2 DM 2021    Diabetic ulcer of left heel associated with type 2 DM 2021    Diabetic ulcer of right midfoot associated with type 2 DM 2021    Difficulty walking     Essential hypertension 2021    Hammertoe     Hyperlipidemia LDL goal <100 2021    Ingrown toenail     Obesity     Paroxysmal atrial fibrillation 2021    Polyneuropathy     Pressure ulcer, stage 1     Tinea unguium     Type 2 diabetes mellitus with polyneuropathy        Past Surgical History:   Procedure Laterality Date    CYST REMOVAL      center of back; benign    INCISION AND DRAINAGE ABSCESS      back    INCISION AND DRAINAGE LEG Right 12/10/2021    Procedure: INCISION AND DRAINAGE LOWER EXTREMITY;  Surgeon: Ash Leyva DPM;  Location: [facility];  Service: Podiatry;  Laterality: Right;    OTHER SURGICAL HISTORY      Surgical clips left foot    TOE  SURGERY Right     Removal of 5th toe    TRANS METATARSAL AMPUTATION Right 12/2/2021    Procedure: AMPUTATION TRANS METATARSAL;  Surgeon: Ash Leyva DPM;  Location: [facility]  Service: Podiatry;  Laterality: Right;    WRIST SURGERY Left     repair of injury       Family History: family history includes Cancer in his father; Heart disease in his brother, father, and mother. Otherwise pertinent FHx was reviewed and not pertinent to current issue.    Social History:  reports that he quit smoking about 32 years ago. His smoking use included cigarettes. He has never used smokeless tobacco. He reports current alcohol use. He reports current drug use. Drug: Marijuana.    Home Medications:  HYDROcodone-acetaminophen, Insulin Lispro (1 Unit Dial), acetaminophen, apixaban, digoxin, insulin detemir, naloxone, pregabalin, and simvastatin    Allergies:  Allergies   Allergen Reactions    Adhesive Tape Rash       Objective    Objective     Vitals:   Temp:  [97.7 °F (36.5 °C)-97.9 °F (36.6 °C)] 97.9 °F (36.6 °C)  Heart Rate:  [84-89] 89  Resp:  [16-18] 16  BP: (117-132)/(60-69) 117/66    Physical Exam:   Constitutional: Awake, alert   HENT: Atraumatic, Normocephalic   Respiratory: Nonlabored respirations    Cardiovascular: Intact peripheral pulses    Musculoskeletal:     Left lower extremity: Externally rotated contracture about the hip.  Hip held in flexion.  Stiffness and pain with attempted motion.  Irritated appearing skin in the groin.  No lateral wounds about the hip.  Upper and lower leg compartments soft.  Stiffness, crepitus, pain with attempted knee motion.  No signs of infection.  Scabbed diabetic wound over the plantar surface of the calcaneus.  Palpable pedal pulse.     Imaging:  Imaging Results (Last 24 Hours)       ** No results found for the last 24 hours. **             Result Review    Result Review:  I have personally reviewed the results from the time of this admission to 9/22/2023 12:50 EDT  and agree with these findings:  [x]  Laboratory  []  Microbiology  [x]  Radiology  []  EKG/Telemetry   []  Cardiology/Vascular   []  Pathology  []  Old records  []  Other:      Assessment & Plan   Assessment / Plan     Brief Patient Summary:  Jose Shaikh is a 62 y.o. male with morbid obesity and poorly controlled diabetes.  History of right transmetatarsal amputation and left diabetic heel wound.  Advanced left hip and knee arthritis.    Active Hospital Problems:  Active Hospital Problems    Diagnosis     **Generalized weakness     Type 2 diabetes mellitus, with long-term current use of insulin     Class 3 severe obesity due to excess calories with serious comorbidity and body mass index (BMI) of 45.0 to 49.9 in adult     Dependence on wheelchair     Foot pain, bilateral     Paroxysmal atrial fibrillation     Essential hypertension     Diabetic ulcer of left heel associated with type 2 DM      Plan:     We discussed his hip and knee arthritis.  We discussed nonoperative management.  We discussed the risk, benefits, indications, and alternatives to a left knee steroid injection.  He elected to proceed and tolerated the injection well.  See the injection note below.  He may also have an intra-articular hip injection with IR if this is felt to be beneficial for his progress.  He may weight-bear as tolerated on the left lower extremity.  Progress as able with PT/OT.  Wound care consultation for his history of diabetic wounds as noted above.  He is not a surgical candidate given his morbid obesity and poorly controlled diabetes.  Further care per primary team.  Please call with any concerns.    Procedure note:    The risk, benefits, indications, and alternatives to a left knee steroid injection were discussed with the patient.  He elected to proceed.  Timeout was taken to ensure the appropriate patient, procedure, and procedural site.  The lateral aspect of the left knee was prepped with Betadine solution x3.  A 10 cc  mixture of 40 mg Depo-Medrol and 1 cc lidocaine was then injected with a 22-gauge 1/2 inch needle into the suprapatellar pouch.  The needle was withdrawn in its entirety.  The patient tolerated the injection well and remained neurovascular intact post procedure.  A sterile bandage was applied after adequate hemostasis was obtained.        Electronically signed by Riki Davis MD, 09/22/23, 12:50 PM EDT.

## 2023-09-23 LAB
ALBUMIN SERPL-MCNC: 3.2 G/DL (ref 3.5–5.2)
ALBUMIN/GLOB SERPL: 1 G/DL
ALP SERPL-CCNC: 193 U/L (ref 39–117)
ALT SERPL W P-5'-P-CCNC: 32 U/L (ref 1–41)
ANION GAP SERPL CALCULATED.3IONS-SCNC: 7.8 MMOL/L (ref 5–15)
AST SERPL-CCNC: 40 U/L (ref 1–40)
BASOPHILS # BLD AUTO: 0.02 10*3/MM3 (ref 0–0.2)
BASOPHILS NFR BLD AUTO: 0.3 % (ref 0–1.5)
BILIRUB SERPL-MCNC: 0.9 MG/DL (ref 0–1.2)
BUN SERPL-MCNC: 10 MG/DL (ref 8–23)
BUN/CREAT SERPL: 19.2 (ref 7–25)
CALCIUM SPEC-SCNC: 8.8 MG/DL (ref 8.6–10.5)
CHLORIDE SERPL-SCNC: 102 MMOL/L (ref 98–107)
CO2 SERPL-SCNC: 24.2 MMOL/L (ref 22–29)
CREAT SERPL-MCNC: 0.52 MG/DL (ref 0.76–1.27)
DEPRECATED RDW RBC AUTO: 55.2 FL (ref 37–54)
EGFRCR SERPLBLD CKD-EPI 2021: 112.6 ML/MIN/1.73
EOSINOPHIL # BLD AUTO: 0.01 10*3/MM3 (ref 0–0.4)
EOSINOPHIL NFR BLD AUTO: 0.2 % (ref 0.3–6.2)
ERYTHROCYTE [DISTWIDTH] IN BLOOD BY AUTOMATED COUNT: 18.6 % (ref 12.3–15.4)
GLOBULIN UR ELPH-MCNC: 3.2 GM/DL
GLUCOSE BLDC GLUCOMTR-MCNC: 203 MG/DL (ref 70–99)
GLUCOSE BLDC GLUCOMTR-MCNC: 276 MG/DL (ref 70–99)
GLUCOSE BLDC GLUCOMTR-MCNC: 308 MG/DL (ref 70–99)
GLUCOSE BLDC GLUCOMTR-MCNC: 323 MG/DL (ref 70–99)
GLUCOSE BLDC GLUCOMTR-MCNC: 339 MG/DL (ref 70–99)
GLUCOSE SERPL-MCNC: 307 MG/DL (ref 65–99)
HCT VFR BLD AUTO: 36.9 % (ref 37.5–51)
HGB BLD-MCNC: 11.6 G/DL (ref 13–17.7)
IMM GRANULOCYTES # BLD AUTO: 0.01 10*3/MM3 (ref 0–0.05)
IMM GRANULOCYTES NFR BLD AUTO: 0.2 % (ref 0–0.5)
LYMPHOCYTES # BLD AUTO: 0.86 10*3/MM3 (ref 0.7–3.1)
LYMPHOCYTES NFR BLD AUTO: 14 % (ref 19.6–45.3)
MAGNESIUM SERPL-MCNC: 1.8 MG/DL (ref 1.6–2.4)
MCH RBC QN AUTO: 25.4 PG (ref 26.6–33)
MCHC RBC AUTO-ENTMCNC: 31.4 G/DL (ref 31.5–35.7)
MCV RBC AUTO: 80.9 FL (ref 79–97)
MONOCYTES # BLD AUTO: 0.38 10*3/MM3 (ref 0.1–0.9)
MONOCYTES NFR BLD AUTO: 6.2 % (ref 5–12)
NEUTROPHILS NFR BLD AUTO: 4.86 10*3/MM3 (ref 1.7–7)
NEUTROPHILS NFR BLD AUTO: 79.1 % (ref 42.7–76)
NRBC BLD AUTO-RTO: 0 /100 WBC (ref 0–0.2)
PLATELET # BLD AUTO: 95 10*3/MM3 (ref 140–450)
PMV BLD AUTO: 11.4 FL (ref 6–12)
POTASSIUM SERPL-SCNC: 4.4 MMOL/L (ref 3.5–5.2)
PROT SERPL-MCNC: 6.4 G/DL (ref 6–8.5)
RBC # BLD AUTO: 4.56 10*6/MM3 (ref 4.14–5.8)
SODIUM SERPL-SCNC: 134 MMOL/L (ref 136–145)
WBC NRBC COR # BLD: 6.14 10*3/MM3 (ref 3.4–10.8)

## 2023-09-23 PROCEDURE — 63710000001 INSULIN LISPRO (HUMAN) PER 5 UNITS: Performed by: PHYSICIAN ASSISTANT

## 2023-09-23 PROCEDURE — 63710000001 INSULIN DETEMIR PER 5 UNITS: Performed by: INTERNAL MEDICINE

## 2023-09-23 PROCEDURE — 82948 REAGENT STRIP/BLOOD GLUCOSE: CPT

## 2023-09-23 PROCEDURE — 63710000001 INSULIN LISPRO (HUMAN) PER 5 UNITS: Performed by: FAMILY MEDICINE

## 2023-09-23 PROCEDURE — 83735 ASSAY OF MAGNESIUM: CPT | Performed by: INTERNAL MEDICINE

## 2023-09-23 PROCEDURE — 99232 SBSQ HOSP IP/OBS MODERATE 35: CPT | Performed by: INTERNAL MEDICINE

## 2023-09-23 PROCEDURE — 85025 COMPLETE CBC W/AUTO DIFF WBC: CPT | Performed by: INTERNAL MEDICINE

## 2023-09-23 PROCEDURE — 80053 COMPREHEN METABOLIC PANEL: CPT | Performed by: INTERNAL MEDICINE

## 2023-09-23 RX ORDER — INSULIN LISPRO 100 [IU]/ML
9 INJECTION, SOLUTION INTRAVENOUS; SUBCUTANEOUS ONCE
Status: COMPLETED | OUTPATIENT
Start: 2023-09-23 | End: 2023-09-23

## 2023-09-23 RX ADMIN — ATORVASTATIN CALCIUM 10 MG: 10 TABLET, FILM COATED ORAL at 21:43

## 2023-09-23 RX ADMIN — INSULIN LISPRO 7 UNITS: 100 INJECTION, SOLUTION INTRAVENOUS; SUBCUTANEOUS at 12:06

## 2023-09-23 RX ADMIN — INSULIN LISPRO 6 UNITS: 100 INJECTION, SOLUTION INTRAVENOUS; SUBCUTANEOUS at 08:28

## 2023-09-23 RX ADMIN — SIMETHICONE 80 MG: 80 TABLET, CHEWABLE ORAL at 17:15

## 2023-09-23 RX ADMIN — APIXABAN 5 MG: 5 TABLET, FILM COATED ORAL at 08:29

## 2023-09-23 RX ADMIN — HYDROCODONE BITARTRATE AND ACETAMINOPHEN 1 TABLET: 7.5; 325 TABLET ORAL at 02:37

## 2023-09-23 RX ADMIN — PREGABALIN 50 MG: 25 CAPSULE ORAL at 21:41

## 2023-09-23 RX ADMIN — APIXABAN 5 MG: 5 TABLET, FILM COATED ORAL at 21:41

## 2023-09-23 RX ADMIN — Medication 10 ML: at 21:42

## 2023-09-23 RX ADMIN — Medication 10 ML: at 08:30

## 2023-09-23 RX ADMIN — INSULIN LISPRO 9 UNITS: 100 INJECTION, SOLUTION INTRAVENOUS; SUBCUTANEOUS at 01:18

## 2023-09-23 RX ADMIN — PREGABALIN 50 MG: 25 CAPSULE ORAL at 16:55

## 2023-09-23 RX ADMIN — MICONAZOLE NITRATE 1 APPLICATION: 2 POWDER TOPICAL at 21:45

## 2023-09-23 RX ADMIN — SIMETHICONE 80 MG: 80 TABLET, CHEWABLE ORAL at 02:14

## 2023-09-23 RX ADMIN — PREGABALIN 50 MG: 25 CAPSULE ORAL at 08:29

## 2023-09-23 RX ADMIN — INSULIN LISPRO 7 UNITS: 100 INJECTION, SOLUTION INTRAVENOUS; SUBCUTANEOUS at 21:42

## 2023-09-23 RX ADMIN — MICONAZOLE NITRATE 1 APPLICATION: 2 POWDER TOPICAL at 08:34

## 2023-09-23 RX ADMIN — INSULIN DETEMIR 30 UNITS: 100 INJECTION, SOLUTION SUBCUTANEOUS at 21:42

## 2023-09-23 RX ADMIN — CYANOCOBALAMIN TAB 500 MCG 1000 MCG: 500 TAB at 08:34

## 2023-09-23 RX ADMIN — INSULIN LISPRO 7 UNITS: 100 INJECTION, SOLUTION INTRAVENOUS; SUBCUTANEOUS at 16:55

## 2023-09-23 RX ADMIN — FAMOTIDINE 40 MG: 20 TABLET, FILM COATED ORAL at 08:29

## 2023-09-23 RX ADMIN — HYDROCODONE BITARTRATE AND ACETAMINOPHEN 1 TABLET: 7.5; 325 TABLET ORAL at 21:42

## 2023-09-23 RX ADMIN — INSULIN DETEMIR 30 UNITS: 100 INJECTION, SOLUTION SUBCUTANEOUS at 08:28

## 2023-09-23 RX ADMIN — Medication: at 12:06

## 2023-09-23 RX ADMIN — BACLOFEN 10 MG: 10 TABLET ORAL at 08:30

## 2023-09-23 RX ADMIN — HYDROCODONE BITARTRATE AND ACETAMINOPHEN 1 TABLET: 7.5; 325 TABLET ORAL at 08:30

## 2023-09-23 NOTE — PROGRESS NOTES
[facility]   Hospitalist Progress Note  Date: 2023  Patient Name: Jose Shaikh  : 1960  MRN: [id number]  Date of admission: 9/10/2023  Room/Bed: [facility]/      Subjective   Subjective     Chief Complaint: Weakness    Summary:Jose Shaikh is a 62 y.o. male with multiple medical problems just discharged from [facility] the day prior to admission.  He stated that he could barely move or get out of his car so EMS was called.  He said that due to nonweightbearing restrictions he was unable to work on his lower extremity weakness at rehab and was unable to care for himself upon discharge.    Interval Followup: No acute events overnight, patient resting comfortably in bed.  Patient status post hip and knee injections yesterday.    Objective   Objective     Vitals:   Temp:  [97.7 °F (36.5 °C)-99 °F (37.2 °C)] 97.7 °F (36.5 °C)  Heart Rate:  [80-95] 80  Resp:  [16-18] 16  BP: (128-148)/(63-83) 148/75    Physical Exam   GEN: No acute distress  HEENT: Moist mucous membranes  LUNGS: Equal chest rise bilaterally  CARDIAC: Regular rate and rhythm  NEURO: Moving all 4 extremities spontaneously  SKIN: No obvious breakdown    Result Review    Result Review:  I have personally reviewed these results:  [x]  Laboratory      Lab 23  0954 23   WBC 6.14 3.78   HEMOGLOBIN 11.6* 11.4*   HEMATOCRIT 36.9* 36.6*   PLATELETS 95* 105*   NEUTROS ABS 4.86  --    IMMATURE GRANS (ABS) 0.01  --    LYMPHS ABS 0.86  --    MONOS ABS 0.38  --    EOS ABS 0.01  --    MCV 80.9 81.0           Lab 23  0954 23  0930   SODIUM 134* 138   POTASSIUM 4.4 3.8   CHLORIDE 102 104   CO2 24.2 25.3   ANION GAP 7.8 8.7   BUN 10 8   CREATININE 0.52* 0.58*   EGFR 112.6 108.9   GLUCOSE 307* 212*   CALCIUM 8.8 8.8   MAGNESIUM 1.8  --    PHOSPHORUS  --  4.3           Lab 23  0954 23  0930   TOTAL PROTEIN 6.4  --    ALBUMIN 3.2* 3.0*   GLOBULIN 3.2  --    ALT (SGPT) 32  --    AST (SGOT) 40  --    BILIRUBIN 0.9   --    ALK PHOS 193*  --                          Brief Urine Lab Results  (Last result in the past 365 days)        Color   Clarity   Blood   Leuk Est   Nitrite   Protein   CREAT   Urine HCG        09/11/23 2200 Dark Yellow   Clear   Negative   Negative   Negative   Negative                 [x]  Microbiology   Microbiology Results (last 10 days)       ** No results found for the last 240 hours. **          [x]  Radiology  FL Guide For Pain Meds Inj    Result Date: 9/22/2023   Successful steroid joint injection of the left hip was performed without complication, patient tolerated procedure.  The patient was instructed to obtain follow up care from the referring physician.     Exam directly supervised by Dr. Cole WEINER       Electronically Signed and Approved By: Slava Galvan M.D. on 9/22/2023 at 16:42            []  EKG/Telemetry   []  Cardiology/Vascular   []  Pathology  []  Old records  []  Other:    Assessment & Plan   Assessment / Plan     Assessment:  Generalized weakness  Diabetes  Low sodium likely a lab error given drastic change this morning  Hypertension  History of atrial fibrillation  Morbid obesity  Debility with wheelchair dependent  Chronic wound    Plan:  Patient placed back in the hospital  Continue PT/OT  Continue current medications  Lomotil added for loose bowel movements, continue  Continue wound care as needed  Status post IR guided hip injection and knee injection by orthopedic surgery  Vitals reviewed  Continue increased dose of Lyrica for more neuropathic pain control  Consider orthopedic surgery consultation for possible joint injection  Wants double portions for breakfast  CBC, CMP reviewed  Continue to titrate insulin as needed for better glucose control  Awaiting rehab arrangements.    Reviewed patients labs and imaging, and discussed with patient and nurse at bedside.    DVT prophylaxis:  Medical DVT prophylaxis orders are present.    CODE STATUS:       Electronically signed  by Max Mehta MD, 09/23/23, 11:13 AM EDT.

## 2023-09-23 NOTE — NURSING NOTE
Right foot wound with black dry crusting linear along distal aspect, plantar aspect with black dry crusting also present   Patient requested to have dressing off for now, no drainage present, will have staff follow up for dressing re-application when patient allows tonight/tomorrow.     No wounds noted to left foot/toes at this time     Abdominal fold and groin folds remain red but improved, tenderness noted to right abdominal fold and linear partial thickness areas remain. Powder in use.         Right buttocks wound has epithelialized but remains very fragile with smooth texture. Patient does not anyone else to look at it, but was educated on need for nursing to continue to monitor area and to continue prevention measures as high risk for re-opening. Patient currently agreeable.

## 2023-09-23 NOTE — PLAN OF CARE
Goal Outcome Evaluation:  Plan of Care Reviewed With: patient        Progress: no change          Pt does seek out staff. Pushed call light accidentally 3 to 4 times throught night Will continue to monitor. Pt alert and oriented. Speech is clear. Medicated twice for pain see emar. Pt resting well at this time.

## 2023-09-24 LAB
GLUCOSE BLDC GLUCOMTR-MCNC: 258 MG/DL (ref 70–99)
GLUCOSE BLDC GLUCOMTR-MCNC: 268 MG/DL (ref 70–99)
GLUCOSE BLDC GLUCOMTR-MCNC: 271 MG/DL (ref 70–99)
GLUCOSE BLDC GLUCOMTR-MCNC: 319 MG/DL (ref 70–99)

## 2023-09-24 PROCEDURE — 82948 REAGENT STRIP/BLOOD GLUCOSE: CPT

## 2023-09-24 PROCEDURE — 99232 SBSQ HOSP IP/OBS MODERATE 35: CPT | Performed by: INTERNAL MEDICINE

## 2023-09-24 PROCEDURE — 63710000001 INSULIN DETEMIR PER 5 UNITS: Performed by: INTERNAL MEDICINE

## 2023-09-24 PROCEDURE — 63710000001 INSULIN LISPRO (HUMAN) PER 5 UNITS: Performed by: FAMILY MEDICINE

## 2023-09-24 PROCEDURE — 97110 THERAPEUTIC EXERCISES: CPT

## 2023-09-24 RX ADMIN — INSULIN DETEMIR 40 UNITS: 100 INJECTION, SOLUTION SUBCUTANEOUS at 20:25

## 2023-09-24 RX ADMIN — PREGABALIN 50 MG: 25 CAPSULE ORAL at 17:53

## 2023-09-24 RX ADMIN — MICONAZOLE NITRATE 1 APPLICATION: 2 POWDER TOPICAL at 20:25

## 2023-09-24 RX ADMIN — APIXABAN 5 MG: 5 TABLET, FILM COATED ORAL at 08:35

## 2023-09-24 RX ADMIN — Medication: at 12:23

## 2023-09-24 RX ADMIN — SIMETHICONE 80 MG: 80 TABLET, CHEWABLE ORAL at 17:53

## 2023-09-24 RX ADMIN — CYANOCOBALAMIN TAB 500 MCG 1000 MCG: 500 TAB at 08:35

## 2023-09-24 RX ADMIN — INSULIN LISPRO 7 UNITS: 100 INJECTION, SOLUTION INTRAVENOUS; SUBCUTANEOUS at 20:25

## 2023-09-24 RX ADMIN — Medication 10 ML: at 20:25

## 2023-09-24 RX ADMIN — DIPHENOXYLATE HYDROCHLORIDE AND ATROPINE SULFATE 1 TABLET: 2.5; .025 TABLET ORAL at 08:34

## 2023-09-24 RX ADMIN — PREGABALIN 50 MG: 25 CAPSULE ORAL at 20:25

## 2023-09-24 RX ADMIN — INSULIN LISPRO 6 UNITS: 100 INJECTION, SOLUTION INTRAVENOUS; SUBCUTANEOUS at 12:23

## 2023-09-24 RX ADMIN — Medication 10 ML: at 08:35

## 2023-09-24 RX ADMIN — APIXABAN 5 MG: 5 TABLET, FILM COATED ORAL at 20:25

## 2023-09-24 RX ADMIN — INSULIN LISPRO 6 UNITS: 100 INJECTION, SOLUTION INTRAVENOUS; SUBCUTANEOUS at 08:33

## 2023-09-24 RX ADMIN — HYDROCODONE BITARTRATE AND ACETAMINOPHEN 1 TABLET: 7.5; 325 TABLET ORAL at 08:34

## 2023-09-24 RX ADMIN — INSULIN LISPRO 6 UNITS: 100 INJECTION, SOLUTION INTRAVENOUS; SUBCUTANEOUS at 17:53

## 2023-09-24 RX ADMIN — ATORVASTATIN CALCIUM 10 MG: 10 TABLET, FILM COATED ORAL at 20:24

## 2023-09-24 RX ADMIN — HYDROCODONE BITARTRATE AND ACETAMINOPHEN 1 TABLET: 7.5; 325 TABLET ORAL at 20:25

## 2023-09-24 RX ADMIN — SIMETHICONE 80 MG: 80 TABLET, CHEWABLE ORAL at 08:34

## 2023-09-24 RX ADMIN — PREGABALIN 50 MG: 25 CAPSULE ORAL at 08:35

## 2023-09-24 RX ADMIN — DIPHENOXYLATE HYDROCHLORIDE AND ATROPINE SULFATE 1 TABLET: 2.5; .025 TABLET ORAL at 17:53

## 2023-09-24 RX ADMIN — BACLOFEN 10 MG: 10 TABLET ORAL at 20:25

## 2023-09-24 RX ADMIN — MICONAZOLE NITRATE 1 APPLICATION: 2 POWDER TOPICAL at 08:36

## 2023-09-24 RX ADMIN — BACLOFEN 10 MG: 10 TABLET ORAL at 08:34

## 2023-09-24 RX ADMIN — FAMOTIDINE 40 MG: 20 TABLET, FILM COATED ORAL at 08:35

## 2023-09-24 RX ADMIN — INSULIN DETEMIR 40 UNITS: 100 INJECTION, SOLUTION SUBCUTANEOUS at 08:33

## 2023-09-24 NOTE — THERAPY TREATMENT NOTE
Acute Care - Physical Therapy Treatment Note  [facility]     Patient Name: Jose Shaikh  : 1960  MRN: [id number]  Today's Date: 2023      Visit Dx:     ICD-10-CM ICD-9-CM   1. Generalized weakness  R53.1 780.79   2. Hyponatremia  E87.1 276.1   3. Difficulty in walking  R26.2 719.7   4. Decreased activities of daily living (ADL)  Z78.9 V49.89   5. Difficulty walking  R26.2 719.7     Patient Active Problem List   Diagnosis    Diabetic ulcer of left heel associated with type 2 DM    Acute osteomyelitis of left calcaneus     Diabetic ulcer of left heel associated with type 2 DM    Diabetic ulcer of right midfoot associated with type 2 DM    Paroxysmal atrial fibrillation    Essential hypertension    Hyperlipidemia LDL goal <100    Cellulitis and abscess of foot    High alkaline phosphatase level    Osteomyelitis    Onychomycosis    Onychocryptosis    Foot pain, bilateral    Osteomyelitis of foot, right, acute    Cellulitis of right foot    Type 2 diabetes mellitus, with long-term current use of insulin    Class 3 severe obesity due to excess calories with serious comorbidity and body mass index (BMI) of 45.0 to 49.9 in adult    Anxiety disorder, unspecified    Claustrophobia    Dependence on wheelchair    Depression, unspecified    Long term (current) use of anticoagulants    Long term (current) use of oral hypoglycemic drugs    Wound of foot    Non-prs chronic ulcer oth prt r foot limited to brkdwn skin    Orthostatic hypotension    Other chronic osteomyelitis, right ankle and foot    Personal history of nicotine dependence    Thrombocytopenia, unspecified    Unspecified open wound, right foot, initial encounter    Diabetic foot infection    Subacute osteomyelitis of right foot    Right foot pain    Sepsis    Onychomycosis    Foot pain, left    Impaired mobility and ADLs    Absence of toe of right foot    Corns and callosity    Disability of walking    Fracture    Limb swelling    Polyneuropathy     Pressure ulcer, stage 1    Shortness of breath    Generalized weakness     Past Medical History:   Diagnosis Date    Absence of toe of right foot     Acute osteomyelitis of left calcaneus  8/18/2021    Anxiety and depression     Arthritis     Claustrophobia     Corns and callus     Diabetic ulcer of left heel associated with type 2 DM 8/18/2021    Diabetic ulcer of left heel associated with type 2 DM 7/6/2021    Diabetic ulcer of right midfoot associated with type 2 DM 8/18/2021    Difficulty walking     Essential hypertension 8/31/2021    Hammertoe     Hyperlipidemia LDL goal <100 8/31/2021    Ingrown toenail     Obesity     Paroxysmal atrial fibrillation 8/31/2021    Polyneuropathy     Pressure ulcer, stage 1     Tinea unguium     Type 2 diabetes mellitus with polyneuropathy      Past Surgical History:   Procedure Laterality Date    CYST REMOVAL      center of back; benign    INCISION AND DRAINAGE ABSCESS      back    INCISION AND DRAINAGE LEG Right 12/10/2021    Procedure: INCISION AND DRAINAGE LOWER EXTREMITY;  Surgeon: Ash Leyva DPM;  Location: [facility];  Service: Podiatry;  Laterality: Right;    OTHER SURGICAL HISTORY      Surgical clips left foot    TOE SURGERY Right     Removal of 5th toe    TRANS METATARSAL AMPUTATION Right 12/2/2021    Procedure: AMPUTATION TRANS METATARSAL;  Surgeon: Ash Leyva DPM;  Location: [facility];  Service: Podiatry;  Laterality: Right;    WRIST SURGERY Left     repair of injury     PT Assessment (last 12 hours)       PT Evaluation and Treatment       Row Name 09/24/23 [facility]          Physical Therapy Time and Intention    Subjective Information pain;fatigue  -DK     Document Type therapy note (daily note)  -DK     Mode of Treatment individual therapy;physical therapy  -DK     Patient Effort fair  -DK     Symptoms Noted During/After Treatment fatigue;increased pain  -DK     Comment Pt had a BM in bed.  He had steriod injectioins in the left hip  and knee.  -       Row Name 09/24/23 1007          Pain    Pretreatment Pain Rating 6/10  -DK     Posttreatment Pain Rating 6/10  -DK     Pain Location - Side/Orientation Left  -DK     Pain Location generalized  -DK     Pain Location - hip;knee;back  -DK     Pain Intervention(s) Distraction;Therapeutic presence  -       Row Name 09/24/23 1007          Cognition    Affect/Mental Status (Cognition) confabulatory  -DK     Orientation Status (Cognition) oriented x 4  -DK     Follows Commands (Cognition) WFL  -DK     Cognitive Function WFL  -DK     Personal Safety Interventions nonskid shoes/slippers when out of bed;supervised activity;gait belt  -       Row Name 09/24/23 1007          Motor Skills    Motor Skills --  therapeutic exercises  -DK     Coordination WFL  -DK     Therapeutic Exercise hip;knee;ankle  -DK     Additional Documentation --  No transfers due to pt having a BM in the bed requiring cleanup.  -       Row Name 09/24/23 1007          Hip (Therapeutic Exercise)    Hip (Therapeutic Exercise) AAROM (active assistive range of motion)  -     Hip AAROM (Therapeutic Exercise) bilateral;flexion;extension;aBduction;aDduction;supine;10 repetitions;2 sets  -       Row Name 09/24/23 1007          Knee (Therapeutic Exercise)    Knee (Therapeutic Exercise) AAROM (active assistive range of motion)  -     Knee AAROM (Therapeutic Exercise) bilateral;flexion;extension;supine;10 repetitions;2 sets  -       Row Name 09/24/23 1007          Ankle (Therapeutic Exercise)    Ankle (Therapeutic Exercise) AROM (active range of motion)  -     Ankle AROM (Therapeutic Exercise) bilateral;dorsiflexion;plantarflexion;supine;20 repititions  -       Row Name             Wound 09/10/23 2345 Left proximal arm Skin Tear    Wound - Properties Group Placement Date: 09/10/23  -BL Placement Time: 2345 -BL Present on Hospital Admission: Y  -BL Side: Left  -BL Orientation: proximal  -BL Location: arm  -BL Primary Wound Type:  Skin tear  -BL, old healing skin tear     Retired Wound - Properties Group Placement Date: 09/10/23  -BL Placement Time: 2345  -BL Present on Hospital Admission: Y  -BL Side: Left  -BL Orientation: proximal  -BL Location: arm  -BL Primary Wound Type: Skin tear  -BL, old healing skin tear     Retired Wound - Properties Group Date first assessed: 09/10/23  -BL Time first assessed: 2345  -BL Present on Hospital Admission: Y  -BL Side: Left  -BL Location: arm  -BL Primary Wound Type: Skin tear  -BL, old healing skin tear       Row Name             Wound 09/10/23 2345 Left posterior foot Diabetic Ulcer    Wound - Properties Group Placement Date: 09/10/23  -BL Placement Time: 2345  -BL Side: Left  -BL Orientation: posterior  -BL Location: foot  -BL Primary Wound Type: Diabetic ulc  -BL    Retired Wound - Properties Group Placement Date: 09/10/23  -BL Placement Time: 2345  -BL Side: Left  -BL Orientation: posterior  -BL Location: foot  -BL Primary Wound Type: Diabetic ulc  -BL    Retired Wound - Properties Group Date first assessed: 09/10/23  -BL Time first assessed: 2345  -BL Side: Left  -BL Location: foot  -BL Primary Wound Type: Diabetic ulc  -BL      Row Name             Wound 09/10/23 2345 Left distal calf Diabetic Ulcer    Wound - Properties Group Placement Date: 09/10/23  -BL Placement Time: 2345  -BL Side: Left  -BL Orientation: distal  -BL Location: calf  -BL Primary Wound Type: Diabetic ulc  -BL    Retired Wound - Properties Group Placement Date: 09/10/23  -BL Placement Time: 2345  -BL Side: Left  -BL Orientation: distal  -BL Location: calf  -BL Primary Wound Type: Diabetic ulc  -BL    Retired Wound - Properties Group Date first assessed: 09/10/23  -BL Time first assessed: 2345  -BL Side: Left  -BL Location: calf  -BL Primary Wound Type: Diabetic ulc  -BL      Row Name             Wound 09/10/23 2345 Right distal calf MASD (Moisture associated skin damage)    Wound - Properties Group Placement Date: 09/10/23  -BL  Placement Time: 2345  -BL Side: Right  -BL Orientation: distal  -BL Location: calf  -BL Primary Wound Type: MASD  -BL    Retired Wound - Properties Group Placement Date: 09/10/23  -BL Placement Time: 2345  -BL Side: Right  -BL Orientation: distal  -BL Location: calf  -BL Primary Wound Type: MASD  -BL    Retired Wound - Properties Group Date first assessed: 09/10/23  -BL Time first assessed: 2345  -BL Side: Right  -BL Location: calf  -BL Primary Wound Type: MASD  -BL      Row Name             Wound 09/10/23 2345 Right anterior hip MASD (Moisture associated skin damage)    Wound - Properties Group Placement Date: 09/10/23  -BL Placement Time: 2345  -BL Present on Hospital Admission: Y  -BL Side: Right  -BL Orientation: anterior  -BL Location: hip  -BL Primary Wound Type: MASD  -BL    Retired Wound - Properties Group Placement Date: 09/10/23  -BL Placement Time: 2345  -BL Present on Hospital Admission: Y  -BL Side: Right  -BL Orientation: anterior  -BL Location: hip  -BL Primary Wound Type: MASD  -BL    Retired Wound - Properties Group Date first assessed: 09/10/23  -BL Time first assessed: 2345  -BL Present on Hospital Admission: Y  -BL Side: Right  -BL Location: hip  -BL Primary Wound Type: MASD  -BL      Row Name             Wound 06/22/21 1133 Left lateral heel    Wound - Properties Group Placement Date: 06/22/21  -FABIOLA Placement Time: 1133  -FABIOLA Present on Hospital Admission: Y  -FABIOLA Side: Left  -FABIOLA Orientation: lateral  -FABIOLA Location: heel  -FABIOLA    Retired Wound - Properties Group Placement Date: 06/22/21  -FABIOLA Placement Time: 1133  -FABIOLA Present on Hospital Admission: Y  -FABIOLA Side: Left  -FABIOLA Orientation: lateral  -FABIOLA Location: heel  -FABIOLA    Retired Wound - Properties Group Date first assessed: 06/22/21  -FABIOLA Time first assessed: 1133  -FABIOLA Present on Hospital Admission: Y  -FABIOLA Side: Left  -FABIOLA Location: heel  -FABIOLA      Row Name             Wound 12/02/21 Right anterior foot Incision    Wound - Properties Group Placement Date:  12/02/21  -ES Side: Right  -ES Orientation: anterior  -ES Location: foot  -ES Primary Wound Type: Incision  -ES    Retired Wound - Properties Group Placement Date: 12/02/21  -ES Side: Right  -ES Orientation: anterior  -ES Location: foot  -ES Primary Wound Type: Incision  -ES    Retired Wound - Properties Group Date first assessed: 12/02/21  -ES Side: Right  -ES Location: foot  -ES Primary Wound Type: Incision  -ES      Row Name             Wound 09/15/23 Right gluteal    Wound - Properties Group Placement Date: 09/15/23  -NH Side: Right  -NH Location: gluteal  -NH    Retired Wound - Properties Group Placement Date: 09/15/23  -NH Side: Right  -NH Location: gluteal  -NH    Retired Wound - Properties Group Date first assessed: 09/15/23  -NH Side: Right  -NH Location: gluteal  -NH      Row Name 09/24/23 1007          Plan of Care Review    Plan of Care Reviewed With patient  -DK     Progress no change  -DK       Row Name 09/24/23 1007          Positioning and Restraints    Pre-Treatment Position in bed  -DK     Post Treatment Position bed  -DK     In Bed supine;call light within reach;encouraged to call for assist;exit alarm on;side rails up x2;legs elevated;heels elevated  -DK       Row Name 09/24/23 1007          Therapy Assessment/Plan (PT)    Rehab Potential (PT) fair, will monitor progress closely  -DK     Criteria for Skilled Interventions Met (PT) skilled treatment is necessary  -DK     Therapy Frequency (PT) daily  -DK     Problem List (PT) problems related to;balance;mobility;range of motion (ROM);strength;pain  -DK     Activity Limitations Related to Problem List (PT) unable to ambulate safely;unable to transfer safely  -DK       Row Name 09/24/23 1007          Progress Summary (PT)    Progress Toward Functional Goals (PT) progress toward functional goals is gradual  -DK               User Key  (r) = Recorded By, (t) = Taken By, (c) = Cosigned By      Initials Name Provider Type    Sarah Bernstein RN  Registered Nurse    Dottie Foster, RN Registered Nurse    Mckenna Landaverde PTA Physical Therapist Assistant    Marlen Vaughn, RN Registered Nurse    Katlyn Garcia RN Registered Nurse                    Physical Therapy Education       Title: PT OT SLP Therapies (Done)       Topic: Physical Therapy (Done)       Point: Mobility training (Done)       Learning Progress Summary             Patient Acceptance, E, VU,NR by RASHARD at 9/24/2023 0604    Acceptance, E, VU,DU by RF at 9/18/2023 1537    Acceptance, E, VU,DU by RF at 9/17/2023 1515    Acceptance, E, VU,DU by RF at 9/16/2023 1804    Acceptance, E, VU by AV at 9/13/2023 1059    Acceptance, E,TB, VU by  at 9/12/2023 1308                         Point: Home exercise program (Done)       Learning Progress Summary             Patient Acceptance, E, VU,NR by RASHARD at 9/24/2023 0604    Acceptance, E, VU,DU by RF at 9/18/2023 1537    Acceptance, E, VU,DU by RF at 9/17/2023 1515    Acceptance, E, VU,DU by RF at 9/16/2023 1804    Acceptance, E, VU by AV at 9/13/2023 1059    Acceptance, E,TB, VU by  at 9/12/2023 1308                         Point: Body mechanics (Done)       Learning Progress Summary             Patient Acceptance, E, VU,NR by RASHARD at 9/24/2023 0604    Acceptance, E, VU,DU by RF at 9/18/2023 1537    Acceptance, E, VU,DU by RF at 9/17/2023 1515    Acceptance, E, VU,DU by RF at 9/16/2023 1804    Acceptance, E, VU by AV at 9/13/2023 1059    Acceptance, E,TB, VU by  at 9/12/2023 1308                         Point: Precautions (Done)       Learning Progress Summary             Patient Acceptance, E, VU,NR by RASHARD at 9/24/2023 0604    Acceptance, E, VU,DU by RF at 9/18/2023 1537    Acceptance, E, VU,DU by RF at 9/17/2023 1515    Acceptance, E, VU,DU by RF at 9/16/2023 1804    Acceptance, E, VU by AV at 9/13/2023 1059    Acceptance, E,TB, VU by RH at 9/12/2023 1308                                         User Key       Initials Effective Dates Name  Provider Type Discipline     06/16/21 -  Selena Lindquist, RONNIE Registered Nurse Nurse    AV 06/16/21 -  Uvaldo Christine OT Occupational Therapist OT    RF 01/19/22 -  Karl Baker, RN Registered Nurse Nurse     08/16/23 -  Gamal Simmons, MERLIN Student PT Student PT                  PT Recommendation and Plan  Planned Therapy Interventions (PT): balance training, bed mobility training, strengthening, transfer training  Therapy Frequency (PT): daily  Progress Summary (PT)  Progress Toward Functional Goals (PT): progress toward functional goals is gradual  Plan of Care Reviewed With: patient  Progress: no change   Outcome Measures       Row Name 09/24/23 1007 09/21/23 1400          How much help from another person do you currently need...    Turning from your back to your side while in flat bed without using bedrails? 2  -DK 2  -AV     Moving from lying on back to sitting on the side of a flat bed without bedrails? 2  -DK 2  -AV     Moving to and from a bed to a chair (including a wheelchair)? 1  -DK 1  -AV     Standing up from a chair using your arms (e.g., wheelchair, bedside chair)? 1  -DK 1  -AV     Climbing 3-5 steps with a railing? 1  -DK 1  -AV     To walk in hospital room? 1  -DK 1  -AV     AM-PAC 6 Clicks Score (PT) 8  -DK 8  -AV        Functional Assessment    Outcome Measure Options AM-PAC 6 Clicks Basic Mobility (PT)  -DK AM-PAC 6 Clicks Basic Mobility (PT)  -AV               User Key  (r) = Recorded By, (t) = Taken By, (c) = Cosigned By      Initials Name Provider Type    Mckenna Landaverde, PTA Physical Therapist Assistant    Frankie Brunson, PT Physical Therapist                     Time Calculation:    PT Charges       Row Name 09/24/23 1010             Time Calculation    PT Received On 09/24/23  -DK      PT Goal Re-Cert Due Date 09/30/23  -DK         Timed Charges    75763 - PT Therapeutic Exercise Minutes 14  -DK      73109 - PT Therapeutic Activity Minutes 3  -DK         Total Minutes     Timed Charges Total Minutes 17  -DK       Total Minutes 17  -DK                User Key  (r) = Recorded By, (t) = Taken By, (c) = Cosigned By      Initials Name Provider Type    Mckenna Landaverde PTA Physical Therapist Assistant                  Therapy Charges for Today       Code Description Service Date Service Provider Modifiers Qty    03521231094 HC PT THER PROC EA 15 MIN 9/24/2023 Mckenna Wong PTA GP 1            PT G-Codes  Outcome Measure Options: AM-PAC 6 Clicks Basic Mobility (PT)  AM-PAC 6 Clicks Score (PT): 8  AM-PAC 6 Clicks Score (OT): 17    Mckenna Wong PTA  9/24/2023

## 2023-09-24 NOTE — PLAN OF CARE
Goal Outcome Evaluation:           Progress: no change  Outcome Evaluation: alert and oriented x 4. wound/skin care performed per orders. blood glucose monitored.

## 2023-09-24 NOTE — PROGRESS NOTES
[facility]   Hospitalist Progress Note  Date: 2023  Patient Name: Jose Shaikh  : 1960  MRN: [id number]  Date of admission: 9/10/2023  Room/Bed: [facility]/      Subjective   Subjective     Chief Complaint: Weakness    Summary:Jose Shaikh is a 62 y.o. male with multiple medical problems just [facility] the [facility] prior to admission.  He stated that he could barely move or get out of his car so EMS was called.  He said that due to nonweightbearing restrictions he was unable to work on his lower extremity weakness at rehab and was unable to care for himself upon discharge.    Interval Followup: No acute events overnight, patient resting comfortably in bed, states his pain may be slightly improved    Objective   Objective     Vitals:   Temp:  [97.9 °F (36.6 °C)-98.5 °F (36.9 °C)] 98.5 °F (36.9 °C)  Heart Rate:  [76-90] 76  Resp:  [16-18] 18  BP: (148-152)/(69-80) 149/76    Physical Exam   GEN: No acute distress  HEENT: Moist mucous membranes  LUNGS: Equal chest rise bilaterally  CARDIAC: Regular rate and rhythm  NEURO: Moving all 4 extremities spontaneously  SKIN: No obvious breakdown    Result Review    Result Review:  I have personally reviewed these results:  [x]  Laboratory      Lab 23  0954 2330   WBC 6.14 3.78   HEMOGLOBIN 11.6* 11.4*   HEMATOCRIT 36.9* 36.6*   PLATELETS 95* 105*   NEUTROS ABS 4.86  --    IMMATURE GRANS (ABS) 0.01  --    LYMPHS ABS 0.86  --    MONOS ABS 0.38  --    EOS ABS 0.01  --    MCV 80.9 81.0           Lab 23  0954 23  0930   SODIUM 134* 138   POTASSIUM 4.4 3.8   CHLORIDE 102 104   CO2 24.2 25.3   ANION GAP 7.8 8.7   BUN 10 8   CREATININE 0.52* 0.58*   EGFR 112.6 108.9   GLUCOSE 307* 212*   CALCIUM 8.8 8.8   MAGNESIUM 1.8  --    PHOSPHORUS  --  4.3           Lab 23  0954 23  0930   TOTAL PROTEIN 6.4  --    ALBUMIN 3.2* 3.0*   GLOBULIN 3.2  --    ALT (SGPT) 32  --    AST (SGOT) 40  --    BILIRUBIN 0.9  --    ALK PHOS  193*  --                          Brief Urine Lab Results  (Last result in the past 365 days)        Color   Clarity   Blood   Leuk Est   Nitrite   Protein   CREAT   Urine HCG        09/11/23 2200 Dark Yellow   Clear   Negative   Negative   Negative   Negative                 [x]  Microbiology   Microbiology Results (last 10 days)       ** No results found for the last 240 hours. **          [x]  Radiology  FL Guide For Pain Meds Inj    Result Date: 9/22/2023   Successful steroid joint injection of the left hip was performed without complication, patient tolerated procedure.  The patient was instructed to obtain follow up care from the referring physician.     Exam directly supervised by Dr. Cole WEINER       Electronically Signed and Approved By: Slava Galvan M.D. on 9/22/2023 at 16:42            []  EKG/Telemetry   []  Cardiology/Vascular   []  Pathology  []  Old records  []  Other:    Assessment & Plan   Assessment / Plan     Assessment:  Generalized weakness  Diabetes  Low sodium likely a lab error given drastic change this morning  Hypertension  History of atrial fibrillation  Morbid obesity  Debility with wheelchair dependent  Chronic wound  Plan:  Patient placed back in the hospital  Continue PT/OT  Continue current medications  Lomotil added for loose bowel movements, continue  Continue wound care as needed  Status post IR guided hip injection and knee injection by orthopedic surgery  Vitals reviewed  Continue increased dose of Lyrica for more neuropathic pain control  Orthopedic surgery consulted, patient status post knee injection  Wants double portions for breakfast  CBC, CMP reviewed  Increase Levemir today for better glucose control  Awaiting rehab arrangements.    Reviewed patients labs and imaging, and discussed with patient and nurse at bedside.    DVT prophylaxis:  Medical DVT prophylaxis orders are present.    CODE STATUS:       Electronically signed by Max Mehta MD, 09/24/23,  12:20 PM EDT.

## 2023-09-24 NOTE — PLAN OF CARE
Goal Outcome Evaluation:  Plan of Care Reviewed With: patient        Progress: no change          Pt resting well. Alert and oriented.Pt has been calm and appropriate. Takes medications without difficulty. Pleasant . Pt watching television most of the night. . Pt resting well at this time.

## 2023-09-25 LAB
ALBUMIN SERPL-MCNC: 3.2 G/DL (ref 3.5–5.2)
ALBUMIN/GLOB SERPL: 1 G/DL
ALP SERPL-CCNC: 189 U/L (ref 39–117)
ALT SERPL W P-5'-P-CCNC: 40 U/L (ref 1–41)
ANION GAP SERPL CALCULATED.3IONS-SCNC: 10.2 MMOL/L (ref 5–15)
AST SERPL-CCNC: 52 U/L (ref 1–40)
BASOPHILS # BLD AUTO: 0.05 10*3/MM3 (ref 0–0.2)
BASOPHILS NFR BLD AUTO: 0.9 % (ref 0–1.5)
BILIRUB SERPL-MCNC: 0.7 MG/DL (ref 0–1.2)
BUN SERPL-MCNC: 15 MG/DL (ref 8–23)
BUN/CREAT SERPL: 27.8 (ref 7–25)
CALCIUM SPEC-SCNC: 8.7 MG/DL (ref 8.6–10.5)
CHLORIDE SERPL-SCNC: 103 MMOL/L (ref 98–107)
CO2 SERPL-SCNC: 24.8 MMOL/L (ref 22–29)
CREAT SERPL-MCNC: 0.54 MG/DL (ref 0.76–1.27)
DEPRECATED RDW RBC AUTO: 54.9 FL (ref 37–54)
EGFRCR SERPLBLD CKD-EPI 2021: 111.3 ML/MIN/1.73
EOSINOPHIL # BLD AUTO: 0.08 10*3/MM3 (ref 0–0.4)
EOSINOPHIL NFR BLD AUTO: 1.5 % (ref 0.3–6.2)
ERYTHROCYTE [DISTWIDTH] IN BLOOD BY AUTOMATED COUNT: 18.6 % (ref 12.3–15.4)
GLOBULIN UR ELPH-MCNC: 3.2 GM/DL
GLUCOSE BLDC GLUCOMTR-MCNC: 185 MG/DL (ref 70–99)
GLUCOSE BLDC GLUCOMTR-MCNC: 213 MG/DL (ref 70–99)
GLUCOSE BLDC GLUCOMTR-MCNC: 277 MG/DL (ref 70–99)
GLUCOSE BLDC GLUCOMTR-MCNC: 326 MG/DL (ref 70–99)
GLUCOSE SERPL-MCNC: 246 MG/DL (ref 65–99)
HCT VFR BLD AUTO: 37.1 % (ref 37.5–51)
HGB BLD-MCNC: 11.7 G/DL (ref 13–17.7)
IMM GRANULOCYTES # BLD AUTO: 0.02 10*3/MM3 (ref 0–0.05)
IMM GRANULOCYTES NFR BLD AUTO: 0.4 % (ref 0–0.5)
LYMPHOCYTES # BLD AUTO: 1.21 10*3/MM3 (ref 0.7–3.1)
LYMPHOCYTES NFR BLD AUTO: 22.2 % (ref 19.6–45.3)
MAGNESIUM SERPL-MCNC: 1.9 MG/DL (ref 1.6–2.4)
MCH RBC QN AUTO: 25.7 PG (ref 26.6–33)
MCHC RBC AUTO-ENTMCNC: 31.5 G/DL (ref 31.5–35.7)
MCV RBC AUTO: 81.5 FL (ref 79–97)
MONOCYTES # BLD AUTO: 0.52 10*3/MM3 (ref 0.1–0.9)
MONOCYTES NFR BLD AUTO: 9.6 % (ref 5–12)
NEUTROPHILS NFR BLD AUTO: 3.56 10*3/MM3 (ref 1.7–7)
NEUTROPHILS NFR BLD AUTO: 65.4 % (ref 42.7–76)
NRBC BLD AUTO-RTO: 0 /100 WBC (ref 0–0.2)
PHOSPHATE SERPL-MCNC: 3.6 MG/DL (ref 2.5–4.5)
PLATELET # BLD AUTO: 106 10*3/MM3 (ref 140–450)
PMV BLD AUTO: 11.3 FL (ref 6–12)
POTASSIUM SERPL-SCNC: 4.3 MMOL/L (ref 3.5–5.2)
PROT SERPL-MCNC: 6.4 G/DL (ref 6–8.5)
RBC # BLD AUTO: 4.55 10*6/MM3 (ref 4.14–5.8)
SODIUM SERPL-SCNC: 138 MMOL/L (ref 136–145)
WBC NRBC COR # BLD: 5.44 10*3/MM3 (ref 3.4–10.8)

## 2023-09-25 PROCEDURE — 82948 REAGENT STRIP/BLOOD GLUCOSE: CPT

## 2023-09-25 PROCEDURE — 84100 ASSAY OF PHOSPHORUS: CPT | Performed by: INTERNAL MEDICINE

## 2023-09-25 PROCEDURE — 97110 THERAPEUTIC EXERCISES: CPT

## 2023-09-25 PROCEDURE — 85025 COMPLETE CBC W/AUTO DIFF WBC: CPT | Performed by: INTERNAL MEDICINE

## 2023-09-25 PROCEDURE — 80053 COMPREHEN METABOLIC PANEL: CPT | Performed by: INTERNAL MEDICINE

## 2023-09-25 PROCEDURE — 63710000001 INSULIN DETEMIR PER 5 UNITS: Performed by: INTERNAL MEDICINE

## 2023-09-25 PROCEDURE — 83735 ASSAY OF MAGNESIUM: CPT | Performed by: INTERNAL MEDICINE

## 2023-09-25 PROCEDURE — 63710000001 INSULIN LISPRO (HUMAN) PER 5 UNITS: Performed by: FAMILY MEDICINE

## 2023-09-25 PROCEDURE — 99232 SBSQ HOSP IP/OBS MODERATE 35: CPT | Performed by: INTERNAL MEDICINE

## 2023-09-25 RX ADMIN — APIXABAN 5 MG: 5 TABLET, FILM COATED ORAL at 08:29

## 2023-09-25 RX ADMIN — ATORVASTATIN CALCIUM 10 MG: 10 TABLET, FILM COATED ORAL at 21:11

## 2023-09-25 RX ADMIN — SIMETHICONE 80 MG: 80 TABLET, CHEWABLE ORAL at 16:58

## 2023-09-25 RX ADMIN — INSULIN LISPRO 7 UNITS: 100 INJECTION, SOLUTION INTRAVENOUS; SUBCUTANEOUS at 21:10

## 2023-09-25 RX ADMIN — Medication 10 ML: at 21:11

## 2023-09-25 RX ADMIN — MICONAZOLE NITRATE 1 APPLICATION: 2 POWDER TOPICAL at 21:11

## 2023-09-25 RX ADMIN — HYDROCODONE BITARTRATE AND ACETAMINOPHEN 1 TABLET: 7.5; 325 TABLET ORAL at 16:58

## 2023-09-25 RX ADMIN — INSULIN DETEMIR 40 UNITS: 100 INJECTION, SOLUTION SUBCUTANEOUS at 08:28

## 2023-09-25 RX ADMIN — HYDROCODONE BITARTRATE AND ACETAMINOPHEN 1 TABLET: 7.5; 325 TABLET ORAL at 03:18

## 2023-09-25 RX ADMIN — INSULIN LISPRO 4 UNITS: 100 INJECTION, SOLUTION INTRAVENOUS; SUBCUTANEOUS at 11:44

## 2023-09-25 RX ADMIN — PREGABALIN 50 MG: 25 CAPSULE ORAL at 08:29

## 2023-09-25 RX ADMIN — HYDROCODONE BITARTRATE AND ACETAMINOPHEN 1 TABLET: 7.5; 325 TABLET ORAL at 21:11

## 2023-09-25 RX ADMIN — INSULIN LISPRO 2 UNITS: 100 INJECTION, SOLUTION INTRAVENOUS; SUBCUTANEOUS at 08:28

## 2023-09-25 RX ADMIN — Medication 10 ML: at 08:30

## 2023-09-25 RX ADMIN — Medication: at 11:44

## 2023-09-25 RX ADMIN — INSULIN LISPRO 6 UNITS: 100 INJECTION, SOLUTION INTRAVENOUS; SUBCUTANEOUS at 16:59

## 2023-09-25 RX ADMIN — HYDROCODONE BITARTRATE AND ACETAMINOPHEN 1 TABLET: 7.5; 325 TABLET ORAL at 08:29

## 2023-09-25 RX ADMIN — PREGABALIN 50 MG: 25 CAPSULE ORAL at 21:10

## 2023-09-25 RX ADMIN — BACLOFEN 10 MG: 10 TABLET ORAL at 08:29

## 2023-09-25 RX ADMIN — CYANOCOBALAMIN TAB 500 MCG 1000 MCG: 500 TAB at 08:29

## 2023-09-25 RX ADMIN — BACLOFEN 10 MG: 10 TABLET ORAL at 21:35

## 2023-09-25 RX ADMIN — APIXABAN 5 MG: 5 TABLET, FILM COATED ORAL at 21:11

## 2023-09-25 RX ADMIN — PREGABALIN 50 MG: 25 CAPSULE ORAL at 16:59

## 2023-09-25 RX ADMIN — DIPHENOXYLATE HYDROCHLORIDE AND ATROPINE SULFATE 1 TABLET: 2.5; .025 TABLET ORAL at 21:11

## 2023-09-25 RX ADMIN — FAMOTIDINE 40 MG: 20 TABLET, FILM COATED ORAL at 08:29

## 2023-09-25 RX ADMIN — INSULIN DETEMIR 40 UNITS: 100 INJECTION, SOLUTION SUBCUTANEOUS at 21:10

## 2023-09-25 RX ADMIN — MICONAZOLE NITRATE 1 APPLICATION: 2 POWDER TOPICAL at 08:30

## 2023-09-25 NOTE — PLAN OF CARE
Goal Outcome Evaluation:  Plan of Care Reviewed With: patient        Progress: no change  Outcome Evaluation: pt aox4, Vs stable. wound/skin care performed as ordered. medicated for pain per mar. no new issues

## 2023-09-25 NOTE — PROGRESS NOTES
[facility]   Hospitalist Progress Note  Date: 2023  Patient Name: Jose Shaikh  : 1960  MRN: [id number]  Date of admission: 9/10/2023  Room/Bed: [facility]/      Subjective   Subjective     Chief Complaint: Weakness    Summary:Jose Shaikh is a 62 y.o. male with multiple medical problems just [facility] to [facility].  He stated that he could barely move or get out of his car so EMS was called.  He said that due to nonweightbearing restrictions he was unable to work on his lower extremity weakness at rehab and was unable to care for himself upon discharge.    Interval Followup: No acute events overnight, patient states that his pain is improving, patient reports working with physical therapy however during my discussions with physical therapy, patient is refusing interventions.    Objective   Objective     Vitals:   Temp:  [97.3 °F (36.3 °C)-98.5 °F (36.9 °C)] 98.5 °F (36.9 °C)  Heart Rate:  [72-84] 77  Resp:  [12-18] 14  BP: (109-140)/(47-74) 133/73    Physical Exam   GEN: No acute distress  HEENT: Moist mucous membranes  LUNGS: Equal chest rise bilaterally  CARDIAC: Regular rate and rhythm  NEURO: Moving all 4 extremities spontaneously  SKIN: No obvious breakdown    Result Review    Result Review:  I have personally reviewed these results:  [x]  Laboratory      Lab 23  0529 23  0954   WBC 5.44 6.14   HEMOGLOBIN 11.7* 11.6*   HEMATOCRIT 37.1* 36.9*   PLATELETS 106* 95*   NEUTROS ABS 3.56 4.86   IMMATURE GRANS (ABS) 0.02 0.01   LYMPHS ABS 1.21 0.86   MONOS ABS 0.52 0.38   EOS ABS 0.08 0.01   MCV 81.5 80.9           Lab 23  0529 23  0954   SODIUM 138 134*   POTASSIUM 4.3 4.4   CHLORIDE 103 102   CO2 24.8 24.2   ANION GAP 10.2 7.8   BUN 15 10   CREATININE 0.54* 0.52*   EGFR 111.3 112.6   GLUCOSE 246* 307*   CALCIUM 8.7 8.8   MAGNESIUM 1.9 1.8   PHOSPHORUS 3.6  --            Lab 23  0529 23  0954   TOTAL PROTEIN 6.4 6.4   ALBUMIN  3.2* 3.2*   GLOBULIN 3.2 3.2   ALT (SGPT) 40 32   AST (SGOT) 52* 40   BILIRUBIN 0.7 0.9   ALK PHOS 189* 193*                         Brief Urine Lab Results  (Last result in the past 365 days)        Color   Clarity   Blood   Leuk Est   Nitrite   Protein   CREAT   Urine HCG        09/11/23 2200 Dark Yellow   Clear   Negative   Negative   Negative   Negative                 [x]  Microbiology   Microbiology Results (last 10 days)       ** No results found for the last 240 hours. **          [x]  Radiology  FL Guide For Pain Meds Inj    Result Date: 9/22/2023   Successful steroid joint injection of the left hip was performed without complication, patient tolerated procedure.  The patient was instructed to obtain follow up care from the referring physician.     Exam directly supervised by Dr. Cole WEINER       Electronically Signed and Approved By: Slava Galvan M.D. on 9/22/2023 at 16:42            []  EKG/Telemetry   []  Cardiology/Vascular   []  Pathology  []  Old records  []  Other:    Assessment & Plan   Assessment / Plan     Assessment:  Generalized weakness  Diabetes  Low sodium likely a lab error given drastic change this morning  Hypertension  History of atrial fibrillation  Morbid obesity  Debility with wheelchair dependent  Chronic wound  Plan:  Patient placed back in the hospital  Continue PT/OT  Continue current medications  Continue as needed Lomotil  Continue wound care as needed  Status post IR guided hip injection and knee injection by orthopedic surgery  Vitals reviewed  Continue increased dose of Lyrica for more neuropathic pain control  Orthopedic surgery consulted, patient status post knee injection  Wants double portions for breakfast  CBC, CMP reviewed  Continue increased dose of Levemir for better glucose control  Awaiting rehab arrangements.    Reviewed patients labs and imaging, and discussed with patient and nurse at bedside.    DVT prophylaxis:  Medical DVT prophylaxis orders are  present.    CODE STATUS:       Electronically signed by Max Mehta MD, 09/25/23, 1:09 PM EDT.

## 2023-09-25 NOTE — PLAN OF CARE
Goal Outcome Evaluation:  Plan of Care Reviewed With: patient        Progress: no change  Outcome Evaluation: alert and oriented x 4. skin care performed per orders this shift. blood glucose monitored.

## 2023-09-25 NOTE — THERAPY TREATMENT NOTE
Acute Care - Physical Therapy Treatment Note  KEO Dominguez     Patient Name: Jose Shaikh  : 1960  MRN: [id number]  Today's Date: 2023      Visit Dx:     ICD-10-CM ICD-9-CM   1. Generalized weakness  R53.1 780.79   2. Hyponatremia  E87.1 276.1   3. Difficulty in walking  R26.2 719.7   4. Decreased activities of daily living (ADL)  Z78.9 V49.89   5. Difficulty walking  R26.2 719.7     Patient Active Problem List   Diagnosis    Diabetic ulcer of left heel associated with type 2 DM    Acute osteomyelitis of left calcaneus     Diabetic ulcer of left heel associated with type 2 DM    Diabetic ulcer of right midfoot associated with type 2 DM    Paroxysmal atrial fibrillation    Essential hypertension    Hyperlipidemia LDL goal <100    Cellulitis and abscess of foot    High alkaline phosphatase level    Osteomyelitis    Onychomycosis    Onychocryptosis    Foot pain, bilateral    Osteomyelitis of foot, right, acute    Cellulitis of right foot    Type 2 diabetes mellitus, with long-term current use of insulin    Class 3 severe obesity due to excess calories with serious comorbidity and body mass index (BMI) of 45.0 to 49.9 in adult    Anxiety disorder, unspecified    Claustrophobia    Dependence on wheelchair    Depression, unspecified    Long term (current) use of anticoagulants    Long term (current) use of oral hypoglycemic drugs    Wound of foot    Non-prs chronic ulcer oth prt r foot limited to brkdwn skin    Orthostatic hypotension    Other chronic osteomyelitis, right ankle and foot    Personal history of nicotine dependence    Thrombocytopenia, unspecified    Unspecified open wound, right foot, initial encounter    Diabetic foot infection    Subacute osteomyelitis of right foot    Right foot pain    Sepsis    Onychomycosis    Foot pain, left    Impaired mobility and ADLs    Absence of toe of right foot    Corns and callosity    Disability of walking    Fracture    Limb swelling    Polyneuropathy     Pressure ulcer, stage 1    Shortness of breath    Generalized weakness     Past Medical History:   Diagnosis Date    Absence of toe of right foot     Acute osteomyelitis of left calcaneus  8/18/2021    Anxiety and depression     Arthritis     Claustrophobia     Corns and callus     Diabetic ulcer of left heel associated with type 2 DM 8/18/2021    Diabetic ulcer of left heel associated with type 2 DM 7/6/2021    Diabetic ulcer of right midfoot associated with type 2 DM 8/18/2021    Difficulty walking     Essential hypertension 8/31/2021    Hammertoe     Hyperlipidemia LDL goal <100 8/31/2021    Ingrown toenail     Obesity     Paroxysmal atrial fibrillation 8/31/2021    Polyneuropathy     Pressure ulcer, stage 1     Tinea unguium     Type 2 diabetes mellitus with polyneuropathy      Past Surgical History:   Procedure Laterality Date    CYST REMOVAL      center of back; benign    INCISION AND DRAINAGE ABSCESS      back    INCISION AND DRAINAGE LEG Right 12/10/2021    Procedure: INCISION AND DRAINAGE LOWER EXTREMITY;  Surgeon: Ash Leyva DPM;  Location: [facility];  Service: Podiatry;  Laterality: Right;    OTHER SURGICAL HISTORY      Surgical clips left foot    TOE SURGERY Right     Removal of 5th toe    TRANS METATARSAL AMPUTATION Right 12/2/2021    Procedure: AMPUTATION TRANS METATARSAL;  Surgeon: Ash Leyva DPM;  Location: [facility] OR;  Service: Podiatry;  Laterality: Right;    WRIST SURGERY Left     repair of injury     PT Assessment (last 12 hours)       PT Evaluation and Treatment       Row Name 09/25/23 1500          Physical Therapy Time and Intention    Subjective Information complains of;weakness;fatigue (P)   -ZT     Document Type therapy note (daily note) (P)   -ZT     Mode of Treatment individual therapy;physical therapy (P)   -ZT     Patient Effort good (P)   -ZT       Row Name 09/25/23 1500          General Information    Patient Profile Reviewed yes (P)   -ZT      Patient Observations alert;cooperative;agree to therapy (P)   -ZT     Existing Precautions/Restrictions fall (P)   -       Row Name 09/25/23 1500          Bed Mobility    Bed Mobility other (see comments) (P)   Pt did not move from supine position to perform his bed exercises.  -       Row Name 09/25/23 1500          Transfers    Transfers other (see comments) (P)   Pt declined Tfs/AMB and performed bed exercises instead. He is unable to Tfs independently at baseline.  -       Row Name 09/25/23 1500          Gait/Stairs (Locomotion)    Gait/Stairs Locomotion other (see comments) (P)   Pt declined Tfs/AMB and performed bed exercises instead. He is unable to AMB independently at baseline.  -       Row Name 09/25/23 1500          Safety Issues, Functional Mobility    Impairments Affecting Function (Mobility) balance;coordination;endurance/activity tolerance;strength (P)   -       Row Name 09/25/23 1500          Balance    Balance Assessment sitting static balance (P)   -ZT     Static Sitting Balance independent (P)   -ZT     Position, Sitting Balance other (see comments) (P)   Laying in bed  -       Row Name 09/25/23 1500          Motor Skills    Therapeutic Exercise hip;knee;ankle (P)   -       Row Name 09/25/23 1500          Hip (Therapeutic Exercise)    Hip (Therapeutic Exercise) strengthening exercise (P)   -ZT     Hip Strengthening (Therapeutic Exercise) aBduction;aDduction;20 repititions;other (see comments) (P)   Pt performed 20 reps of glute sets, hip adduction, and hip abduction  -       Row Name 09/25/23 1500          Knee (Therapeutic Exercise)    Knee Strengthening (Therapeutic Exercise) SLR (straight leg raise);20 repititions;other (see comments) (P)   Pt performed 20 reps of SLR bilaterally and quad sets.  -       Row Name 09/25/23 1500          Ankle (Therapeutic Exercise)    Ankle (Therapeutic Exercise) strengthening exercise (P)   -ZT     Ankle Strengthening (Therapeutic Exercise)  dorsiflexion;plantarflexion;20 repititions;other (see comments) (P)   Pt performed 20 reps of ankle pumps  -ZT       Row Name             Wound 09/10/23 2345 Left proximal arm Skin Tear    Wound - Properties Group Placement Date: 09/10/23  -BL Placement Time: 2345 -BL Present on Hospital Admission: Y  -BL Side: Left  -BL Orientation: proximal  -BL Location: arm  -BL Primary Wound Type: Skin tear  -BL, old healing skin tear     Retired Wound - Properties Group Placement Date: 09/10/23  -BL Placement Time: 2345  -BL Present on Hospital Admission: Y  -BL Side: Left  -BL Orientation: proximal  -BL Location: arm  -BL Primary Wound Type: Skin tear  -BL, old healing skin tear     Retired Wound - Properties Group Date first assessed: 09/10/23  -BL Time first assessed: 2345  -BL Present on Hospital Admission: Y  -BL Side: Left  -BL Location: arm  -BL Primary Wound Type: Skin tear  -BL, old healing skin tear       Row Name             Wound 09/10/23 2345 Left posterior foot Diabetic Ulcer    Wound - Properties Group Placement Date: 09/10/23  -BL Placement Time: 2345 -BL Side: Left  -BL Orientation: posterior  -BL Location: foot  -BL Primary Wound Type: Diabetic ulc  -BL    Retired Wound - Properties Group Placement Date: 09/10/23  -BL Placement Time: 2345  -BL Side: Left  -BL Orientation: posterior  -BL Location: foot  -BL Primary Wound Type: Diabetic ulc  -BL    Retired Wound - Properties Group Date first assessed: 09/10/23  -BL Time first assessed: 2345  -BL Side: Left  -BL Location: foot  -BL Primary Wound Type: Diabetic ulc  -BL      Row Name             Wound 09/10/23 2345 Left distal calf Diabetic Ulcer    Wound - Properties Group Placement Date: 09/10/23  -BL Placement Time: 2345  -BL Side: Left  -BL Orientation: distal  -BL Location: calf  -BL Primary Wound Type: Diabetic ulc  -BL    Retired Wound - Properties Group Placement Date: 09/10/23  -BL Placement Time: 2345  -BL Side: Left  -BL Orientation: distal  -BL  Location: calf  -BL Primary Wound Type: Diabetic ulc  -BL    Retired Wound - Properties Group Date first assessed: 09/10/23  -BL Time first assessed: 2345  -BL Side: Left  -BL Location: calf  -BL Primary Wound Type: Diabetic ulc  -BL      Row Name             Wound 09/10/23 2345 Right distal calf MASD (Moisture associated skin damage)    Wound - Properties Group Placement Date: 09/10/23  -BL Placement Time: 2345  -BL Side: Right  -BL Orientation: distal  -BL Location: calf  -BL Primary Wound Type: MASD  -BL    Retired Wound - Properties Group Placement Date: 09/10/23  -BL Placement Time: 2345  -BL Side: Right  -BL Orientation: distal  -BL Location: calf  -BL Primary Wound Type: MASD  -BL    Retired Wound - Properties Group Date first assessed: 09/10/23  -BL Time first assessed: 2345  -BL Side: Right  -BL Location: calf  -BL Primary Wound Type: MASD  -BL      Row Name             Wound 09/10/23 2345 Right anterior hip MASD (Moisture associated skin damage)    Wound - Properties Group Placement Date: 09/10/23  -BL Placement Time: 2345  -BL Present on Hospital Admission: Y  -BL Side: Right  -BL Orientation: anterior  -BL Location: hip  -BL Primary Wound Type: MASD  -BL    Retired Wound - Properties Group Placement Date: 09/10/23  -BL Placement Time: 2345  -BL Present on Hospital Admission: Y  -BL Side: Right  -BL Orientation: anterior  -BL Location: hip  -BL Primary Wound Type: MASD  -BL    Retired Wound - Properties Group Date first assessed: 09/10/23  -BL Time first assessed: 2345  -BL Present on Hospital Admission: Y  -BL Side: Right  -BL Location: hip  -BL Primary Wound Type: MASD  -BL      Row Name             Wound 06/22/21 1133 Left lateral heel    Wound - Properties Group Placement Date: 06/22/21  -FABIOLA Placement Time: 1133  -FABIOLA Present on Hospital Admission: Y  -FABIOLA Side: Left  -FABIOLA Orientation: lateral  -FABIOLA Location: heel  -FABIOLA    Retired Wound - Properties Group Placement Date: 06/22/21  -FABIOLA Placement Time: 1133   -FABIOLA Present on Hospital Admission: Y  -FABIOLA Side: Left  -FABIOLA Orientation: lateral  -FABIOLA Location: heel  -FABIOLA    Retired Wound - Properties Group Date first assessed: 06/22/21  -FABIOLA Time first assessed: 1133  -FABIOLA Present on Hospital Admission: Y  -FABIOLA Side: Left  -FABIOLA Location: heel  -FABIOLA      Row Name             Wound 12/02/21 Right anterior foot Incision    Wound - Properties Group Placement Date: 12/02/21  -ES Side: Right  -ES Orientation: anterior  -ES Location: foot  -ES Primary Wound Type: Incision  -ES    Retired Wound - Properties Group Placement Date: 12/02/21  -ES Side: Right  -ES Orientation: anterior  -ES Location: foot  -ES Primary Wound Type: Incision  -ES    Retired Wound - Properties Group Date first assessed: 12/02/21  -ES Side: Right  -ES Location: foot  -ES Primary Wound Type: Incision  -ES      Row Name             Wound 09/15/23 Right gluteal    Wound - Properties Group Placement Date: 09/15/23  -NH Side: Right  -NH Location: gluteal  -NH    Retired Wound - Properties Group Placement Date: 09/15/23  -NH Side: Right  -NH Location: gluteal  -NH    Retired Wound - Properties Group Date first assessed: 09/15/23  -NH Side: Right  -NH Location: gluteal  -NH      Row Name 09/25/23 1500          Plan of Care Review    Plan of Care Reviewed With patient (P)   -ZT       Row Name 09/25/23 1500          Therapy Assessment/Plan (PT)    Rehab Potential (PT) good, to achieve stated therapy goals (P)   -ZT     Criteria for Skilled Interventions Met (PT) yes;skilled treatment is necessary (P)   -ZT     Therapy Frequency (PT) daily (P)   -ZT     Predicted Duration of Therapy Intervention (PT) 10 days (P)   -ZT     Problem List (PT) problems related to;balance;coordination;strength (P)   -ZT     Activity Limitations Related to Problem List (PT) unable to ambulate safely;unable to transfer safely;BADLs not performed adequately or safely;IADLs not performed adequately or safely;community activities not performed adequately  or safely (P)   -ZT       Row Name 09/25/23 1500          Progress Summary (PT)    Progress Toward Functional Goals (PT) progress toward functional goals is good (P)   -ZT     Daily Progress Summary (PT) Pt demonstrates decreased activity tolerance and decreased endurance. He was able to perform all the bed exercises with no issues, but he had difficulty with SLR and hip add/abd exercises. He still requires skilled PT services to address his deficits so that he can return to a premorbid level of function. (P)   -ZT       Row Name 09/25/23 1500          Therapy Plan Review/Discharge Plan (PT)    Therapy Plan Review (PT) evaluation/treatment results reviewed;care plan/treatment goals reviewed;participants included;patient (P)   -ZT               User Key  (r) = Recorded By, (t) = Taken By, (c) = Cosigned By      Initials Name Provider Type    Sarah Bernstein, RN Registered Nurse    Dottie Foster, RN Registered Nurse    Marlen Vaughn RN Registered Nurse    Katlyn Garcia RN Registered Nurse    Oscar Ward, PT Student PT Student                    Physical Therapy Education       Title: PT OT SLP Therapies (Done)       Topic: Physical Therapy (Done)       Point: Mobility training (Done)       Learning Progress Summary             Patient Acceptance, E, VU,NR by RASHARD at 9/24/2023 0604    Acceptance, E, VU,DU by RF at 9/18/2023 1537    Acceptance, E, VU,DU by RF at 9/17/2023 1515    Acceptance, E, VU,DU by RF at 9/16/2023 1804    Acceptance, E, VU by AV at 9/13/2023 1059    Acceptance, E,TB, VU by  at 9/12/2023 1308                         Point: Home exercise program (Done)       Learning Progress Summary             Patient Acceptance, E, VU,NR by RASHARD at 9/24/2023 0604    Acceptance, E, VU,DU by RF at 9/18/2023 1537    Acceptance, E, VU,DU by RF at 9/17/2023 1515    Acceptance, E, VU,DU by RF at 9/16/2023 1804    Acceptance, E, VU by AV at 9/13/2023 1059    Acceptance, E,TB, VU by  at  9/12/2023 1308                         Point: Body mechanics (Done)       Learning Progress Summary             Patient Acceptance, E, VU,NR by RASHARD at 9/24/2023 0604    Acceptance, E, VU,DU by  at 9/18/2023 1537    Acceptance, E, VU,DU by RF at 9/17/2023 1515    Acceptance, E, VU,DU by RF at 9/16/2023 1804    Acceptance, E, VU by AV at 9/13/2023 1059    Acceptance, E,TB, VU by  at 9/12/2023 1308                         Point: Precautions (Done)       Learning Progress Summary             Patient Acceptance, E, VU,NR by RASHARD at 9/24/2023 0604    Acceptance, E, VU,DU by  at 9/18/2023 1537    Acceptance, E, VU,DU by RF at 9/17/2023 1515    Acceptance, E, VU,DU by RF at 9/16/2023 1804    Acceptance, E, VU by  at 9/13/2023 1059    Acceptance, E,TB, VU by  at 9/12/2023 1308                                         User Key       Initials Effective Dates Name Provider Type Discipline     06/16/21 -  Selena Lindquist, RN Registered Nurse Nurse     06/16/21 -  Uvaldo Christine OT Occupational Therapist OT     01/19/22 -  Karl Baker, RN Registered Nurse Nurse     08/16/23 -  Gamal Simmons, MERLIN Student PT Student PT                  PT Recommendation and Plan  Anticipated Discharge Disposition (PT): (P) inpatient rehabilitation facility  Planned Therapy Interventions (PT): (P) balance training, bed mobility training, gait training, strengthening, stair training, transfer training  Therapy Frequency (PT): (P) daily  Progress Summary (PT)  Progress Toward Functional Goals (PT): (P) progress toward functional goals is good  Daily Progress Summary (PT): (P) Pt demonstrates decreased activity tolerance and decreased endurance. He was able to perform all the bed exercises with no issues, but he had difficulty with SLR and hip add/abd exercises. He still requires skilled PT services to address his deficits so that he can return to a premorbid level of function.  Plan of Care Reviewed With: (P) patient   Outcome  Measures       Row Name 09/25/23 1500 09/24/23 1007          How much help from another person do you currently need...    Turning from your back to your side while in flat bed without using bedrails? 2 (P)   -ZT 2  -DK     Moving from lying on back to sitting on the side of a flat bed without bedrails? 2 (P)   -ZT 2  -DK     Moving to and from a bed to a chair (including a wheelchair)? 1 (P)   -ZT 1  -DK     Standing up from a chair using your arms (e.g., wheelchair, bedside chair)? 1 (P)   -ZT 1  -DK     Climbing 3-5 steps with a railing? 1 (P)   -ZT 1  -DK     To walk in hospital room? 1 (P)   -ZT 1  -DK     AM-PAC 6 Clicks Score (PT) 8 (P)   -ZT 8  -DK        Functional Assessment    Outcome Measure Options -- AM-PAC 6 Clicks Basic Mobility (PT)  -DK               User Key  (r) = Recorded By, (t) = Taken By, (c) = Cosigned By      Initials Name Provider Type    Mckenna Landaverde, PTA Physical Therapist Assistant    ZT Oscar Shields, PT Student PT Student                     Time Calculation:    PT Charges       Row Name 09/25/23 1519             Time Calculation    PT Received On 09/25/23 (P)   -ZT      PT Goal Re-Cert Due Date 10/04/23 (P)   -ZT         Timed Charges    05480 - PT Therapeutic Exercise Minutes 15 (P)   -ZT         Total Minutes    Timed Charges Total Minutes 15 (P)   -ZT       Total Minutes 15 (P)   -ZT                User Key  (r) = Recorded By, (t) = Taken By, (c) = Cosigned By      Initials Name Provider Type    Oscar Ward PT Student PT Student                  Therapy Charges for Today       Code Description Service Date Service Provider Modifiers Qty    05569953593 HC PT THER PROC EA 15 MIN 9/25/2023 Oscar Shields PT Student GP 1            PT G-Codes  Outcome Measure Options: AM-PAC 6 Clicks Basic Mobility (PT)  AM-PAC 6 Clicks Score (PT): (P) 8  AM-PAC 6 Clicks Score (OT): 17    Oscar Shields PT Student  9/25/2023

## 2023-09-26 LAB
GLUCOSE BLDC GLUCOMTR-MCNC: 221 MG/DL (ref 70–99)
GLUCOSE BLDC GLUCOMTR-MCNC: 223 MG/DL (ref 70–99)
GLUCOSE BLDC GLUCOMTR-MCNC: 245 MG/DL (ref 70–99)
GLUCOSE BLDC GLUCOMTR-MCNC: 246 MG/DL (ref 70–99)
GLUCOSE BLDC GLUCOMTR-MCNC: 376 MG/DL (ref 70–99)

## 2023-09-26 PROCEDURE — 97530 THERAPEUTIC ACTIVITIES: CPT

## 2023-09-26 PROCEDURE — 99232 SBSQ HOSP IP/OBS MODERATE 35: CPT | Performed by: INTERNAL MEDICINE

## 2023-09-26 PROCEDURE — 63710000001 INSULIN LISPRO (HUMAN) PER 5 UNITS: Performed by: FAMILY MEDICINE

## 2023-09-26 PROCEDURE — 82948 REAGENT STRIP/BLOOD GLUCOSE: CPT

## 2023-09-26 PROCEDURE — 63710000001 INSULIN DETEMIR PER 5 UNITS: Performed by: INTERNAL MEDICINE

## 2023-09-26 PROCEDURE — 97110 THERAPEUTIC EXERCISES: CPT

## 2023-09-26 RX ADMIN — MICONAZOLE NITRATE 1 APPLICATION: 2 POWDER TOPICAL at 08:20

## 2023-09-26 RX ADMIN — HYDROCODONE BITARTRATE AND ACETAMINOPHEN 1 TABLET: 7.5; 325 TABLET ORAL at 21:06

## 2023-09-26 RX ADMIN — BACLOFEN 10 MG: 10 TABLET ORAL at 21:06

## 2023-09-26 RX ADMIN — FAMOTIDINE 40 MG: 20 TABLET, FILM COATED ORAL at 08:19

## 2023-09-26 RX ADMIN — INSULIN LISPRO 4 UNITS: 100 INJECTION, SOLUTION INTRAVENOUS; SUBCUTANEOUS at 21:06

## 2023-09-26 RX ADMIN — PREGABALIN 50 MG: 25 CAPSULE ORAL at 08:19

## 2023-09-26 RX ADMIN — INSULIN DETEMIR 40 UNITS: 100 INJECTION, SOLUTION SUBCUTANEOUS at 08:14

## 2023-09-26 RX ADMIN — HYDROCODONE BITARTRATE AND ACETAMINOPHEN 1 TABLET: 7.5; 325 TABLET ORAL at 16:35

## 2023-09-26 RX ADMIN — Medication 10 ML: at 21:06

## 2023-09-26 RX ADMIN — HYDROCODONE BITARTRATE AND ACETAMINOPHEN 1 TABLET: 7.5; 325 TABLET ORAL at 10:31

## 2023-09-26 RX ADMIN — PREGABALIN 50 MG: 25 CAPSULE ORAL at 21:05

## 2023-09-26 RX ADMIN — PREGABALIN 50 MG: 25 CAPSULE ORAL at 16:35

## 2023-09-26 RX ADMIN — INSULIN LISPRO 4 UNITS: 100 INJECTION, SOLUTION INTRAVENOUS; SUBCUTANEOUS at 16:36

## 2023-09-26 RX ADMIN — HYDROCODONE BITARTRATE AND ACETAMINOPHEN 1 TABLET: 7.5; 325 TABLET ORAL at 03:19

## 2023-09-26 RX ADMIN — DIPHENOXYLATE HYDROCHLORIDE AND ATROPINE SULFATE 1 TABLET: 2.5; .025 TABLET ORAL at 04:05

## 2023-09-26 RX ADMIN — Medication: at 10:31

## 2023-09-26 RX ADMIN — INSULIN DETEMIR 40 UNITS: 100 INJECTION, SOLUTION SUBCUTANEOUS at 21:06

## 2023-09-26 RX ADMIN — ATORVASTATIN CALCIUM 10 MG: 10 TABLET, FILM COATED ORAL at 21:06

## 2023-09-26 RX ADMIN — INSULIN LISPRO 4 UNITS: 100 INJECTION, SOLUTION INTRAVENOUS; SUBCUTANEOUS at 08:14

## 2023-09-26 RX ADMIN — INSULIN LISPRO 4 UNITS: 100 INJECTION, SOLUTION INTRAVENOUS; SUBCUTANEOUS at 12:58

## 2023-09-26 RX ADMIN — SIMETHICONE 80 MG: 80 TABLET, CHEWABLE ORAL at 21:06

## 2023-09-26 RX ADMIN — Medication 10 ML: at 08:20

## 2023-09-26 RX ADMIN — APIXABAN 5 MG: 5 TABLET, FILM COATED ORAL at 21:06

## 2023-09-26 RX ADMIN — MICONAZOLE NITRATE 1 APPLICATION: 2 POWDER TOPICAL at 21:06

## 2023-09-26 RX ADMIN — APIXABAN 5 MG: 5 TABLET, FILM COATED ORAL at 08:20

## 2023-09-26 RX ADMIN — BACLOFEN 10 MG: 10 TABLET ORAL at 10:31

## 2023-09-26 RX ADMIN — DIPHENOXYLATE HYDROCHLORIDE AND ATROPINE SULFATE 1 TABLET: 2.5; .025 TABLET ORAL at 21:10

## 2023-09-26 RX ADMIN — CYANOCOBALAMIN TAB 500 MCG 1000 MCG: 500 TAB at 08:19

## 2023-09-26 RX ADMIN — SIMETHICONE 80 MG: 80 TABLET, CHEWABLE ORAL at 04:05

## 2023-09-26 NOTE — PROGRESS NOTES
[facility]   Hospitalist Progress Note  Date: 2023  Patient Name: Jose Shaikh  : 1960  MRN: [id number]  Date of admission: 9/10/2023  Room/Bed: 4027/      Subjective   Subjective     Chief Complaint: Weakness    Summary:Jose Shaikh is a 62 y.o. male with multiple medical problems [facility].  He stated that he could barely move or get out of his car so EMS was called.  He said that due to nonweightbearing restrictions he was unable to work on his lower extremity weakness at rehab and was unable to care for himself upon discharge.  Patient had hip and knee injection in hopes that this would help with his pain, he is working better with physical therapy.  He is still unable to walk at this time and  is trying to arrange rehab placement.    Interval Followup: No acute events overnight, patient states that he worked well with physical therapy in the bed    Objective   Objective     Vitals:   Temp:  [97.5 °F (36.4 °C)-98.1 °F (36.7 °C)] 97.7 °F (36.5 °C)  Heart Rate:  [80-94] 81  Resp:  [16] 16  BP: (109-152)/(62-93) 138/80    Physical Exam   GEN: No acute distress  HEENT: Moist mucous membranes  LUNGS: Equal chest rise bilaterally  CARDIAC: Regular rate and rhythm  NEURO: Moving all 4 extremities spontaneously  SKIN: No obvious breakdown    Result Review    Result Review:  I have personally reviewed these results:  [x]  Laboratory      Lab 23  0529 23  0954   WBC 5.44 6.14   HEMOGLOBIN 11.7* 11.6*   HEMATOCRIT 37.1* 36.9*   PLATELETS 106* 95*   NEUTROS ABS 3.56 4.86   IMMATURE GRANS (ABS) 0.02 0.01   LYMPHS ABS 1.21 0.86   MONOS ABS 0.52 0.38   EOS ABS 0.08 0.01   MCV 81.5 80.9           Lab 23  0529 23  0954   SODIUM 138 134*   POTASSIUM 4.3 4.4   CHLORIDE 103 102   CO2 24.8 24.2   ANION GAP 10.2 7.8   BUN 15 10   CREATININE 0.54* 0.52*   EGFR 111.3 112.6   GLUCOSE 246* 307*   CALCIUM 8.7 8.8   MAGNESIUM  1.9 1.8   PHOSPHORUS 3.6  --            Lab 09/25/23  0529 09/23/23  0954   TOTAL PROTEIN 6.4 6.4   ALBUMIN 3.2* 3.2*   GLOBULIN 3.2 3.2   ALT (SGPT) 40 32   AST (SGOT) 52* 40   BILIRUBIN 0.7 0.9   ALK PHOS 189* 193*                         Brief Urine Lab Results  (Last result in the past 365 days)        Color   Clarity   Blood   Leuk Est   Nitrite   Protein   CREAT   Urine HCG        09/11/23 2200 Dark Yellow   Clear   Negative   Negative   Negative   Negative                 [x]  Microbiology   Microbiology Results (last 10 days)       ** No results found for the last 240 hours. **          [x]  Radiology  FL Guide For Pain Meds Inj    Result Date: 9/22/2023   Successful steroid joint injection of the left hip was performed without complication, patient tolerated procedure.  The patient was instructed to obtain follow up care from the referring physician.     Exam directly supervised by Dr. Cole WEINER       Electronically Signed and Approved By: Slava Galvan M.D. on 9/22/2023 at 16:42            []  EKG/Telemetry   []  Cardiology/Vascular   []  Pathology  []  Old records  []  Other:    Assessment & Plan   Assessment / Plan     Assessment:  Generalized weakness  Diabetes  Low sodium likely a lab error given drastic change this morning  Hypertension  History of atrial fibrillation  Morbid obesity  Debility with wheelchair dependent  Chronic wound  Plan:  Patient placed back in the hospital  Continue PT/OT  Continue current medications  Continue as needed Lomotil  Continue wound care as needed  Status post IR guided hip injection and knee injection by orthopedic surgery  Vitals reviewed  Continue increased dose of Lyrica for more neuropathic pain control  Orthopedic surgery consulted, patient status post knee injection  Wants double portions for breakfast  CBC, CMP reviewed  Continue increased dose of Levemir for better glucose control, tolerating  Awaiting rehab arrangements.    Reviewed patients labs  and imaging, and discussed with patient and nurse at bedside.    DVT prophylaxis:  Medical DVT prophylaxis orders are present.    CODE STATUS:       Electronically signed by Max Mehta MD, 09/26/23, 12:04 PM EDT.

## 2023-09-26 NOTE — PLAN OF CARE
Goal Outcome Evaluation:  Plan of Care Reviewed With: patient        Progress: no change  Outcome Evaluation: pt aox4, pt refusing most turns, educated on skin breakdown. skin care performed as ordered. medicated for pain per mar

## 2023-09-26 NOTE — THERAPY TREATMENT NOTE
Acute Care - Physical Therapy Treatment Note  KEO Dominguez     Patient Name: Jose Shaikh  : 1960  MRN: [id number]  Today's Date: 2023      Visit Dx:     ICD-10-CM ICD-9-CM   1. Generalized weakness  R53.1 780.79   2. Hyponatremia  E87.1 276.1   3. Difficulty in walking  R26.2 719.7   4. Decreased activities of daily living (ADL)  Z78.9 V49.89   5. Difficulty walking  R26.2 719.7     Patient Active Problem List   Diagnosis    Diabetic ulcer of left heel associated with type 2 DM    Acute osteomyelitis of left calcaneus     Diabetic ulcer of left heel associated with type 2 DM    Diabetic ulcer of right midfoot associated with type 2 DM    Paroxysmal atrial fibrillation    Essential hypertension    Hyperlipidemia LDL goal <100    Cellulitis and abscess of foot    High alkaline phosphatase level    Osteomyelitis    Onychomycosis    Onychocryptosis    Foot pain, bilateral    Osteomyelitis of foot, right, acute    Cellulitis of right foot    Type 2 diabetes mellitus, with long-term current use of insulin    Class 3 severe obesity due to excess calories with serious comorbidity and body mass index (BMI) of 45.0 to 49.9 in adult    Anxiety disorder, unspecified    Claustrophobia    Dependence on wheelchair    Depression, unspecified    Long term (current) use of anticoagulants    Long term (current) use of oral hypoglycemic drugs    Wound of foot    Non-prs chronic ulcer oth prt r foot limited to brkdwn skin    Orthostatic hypotension    Other chronic osteomyelitis, right ankle and foot    Personal history of nicotine dependence    Thrombocytopenia, unspecified    Unspecified open wound, right foot, initial encounter    Diabetic foot infection    Subacute osteomyelitis of right foot    Right foot pain    Sepsis    Onychomycosis    Foot pain, left    Impaired mobility and ADLs    Absence of toe of right foot    Corns and callosity    Disability of walking    Fracture    Limb swelling    Polyneuropathy     Pressure ulcer, stage 1    Shortness of breath    Generalized weakness     Past Medical History:   Diagnosis Date    Absence of toe of right foot     Acute osteomyelitis of left calcaneus  8/18/2021    Anxiety and depression     Arthritis     Claustrophobia     Corns and callus     Diabetic ulcer of left heel associated with type 2 DM 8/18/2021    Diabetic ulcer of left heel associated with type 2 DM 7/6/2021    Diabetic ulcer of right midfoot associated with type 2 DM 8/18/2021    Difficulty walking     Essential hypertension 8/31/2021    Hammertoe     Hyperlipidemia LDL goal <100 8/31/2021    Ingrown toenail     Obesity     Paroxysmal atrial fibrillation 8/31/2021    Polyneuropathy     Pressure ulcer, stage 1     Tinea unguium     Type 2 diabetes mellitus with polyneuropathy      Past Surgical History:   Procedure Laterality Date    CYST REMOVAL      center of back; benign    INCISION AND DRAINAGE ABSCESS      back    INCISION AND DRAINAGE LEG Right 12/10/2021    Procedure: INCISION AND DRAINAGE LOWER EXTREMITY;  Surgeon: Ash Leyva DPM;  Location: [facility];  Service: Podiatry;  Laterality: Right;    OTHER SURGICAL HISTORY      Surgical clips left foot    TOE SURGERY Right     Removal of 5th toe    TRANS METATARSAL AMPUTATION Right 12/2/2021    Procedure: AMPUTATION TRANS METATARSAL;  Surgeon: Ash Leyva DPM;  Location: [facility] OR;  Service: Podiatry;  Laterality: Right;    WRIST SURGERY Left     repair of injury     PT Assessment (last 12 hours)       PT Evaluation and Treatment       Row Name 09/26/23 1500          Physical Therapy Time and Intention    Subjective Information complains of;weakness;pain (P)   -RH     Document Type therapy note (daily note) (P)   -RH     Mode of Treatment individual therapy;physical therapy (P)   -RH     Patient Effort good (P)   -RH     Symptoms Noted During/After Treatment increased pain;fatigue (P)   -RH       Row Name 09/26/23 8891           General Information    Patient Profile Reviewed yes (P)   -RH     Patient Observations alert;cooperative;agree to therapy (P)   -       Row Name 09/26/23 1500          Bed Mobility    Bed Mobility bed mobility (all) activities (P)   -RH     All Activities, Walker (Bed Mobility) moderate assist (50% patient effort);1 person assist;verbal cues (P)   -RH     Supine-Sit Walker (Bed Mobility) moderate assist (50% patient effort) (P)   -RH     Sit-Supine Walker (Bed Mobility) moderate assist (50% patient effort) (P)   -RH     Assistive Device (Bed Mobility) bed rails;draw sheet;head of bed elevated (P)   -RH       Row Name 09/26/23 1500          Transfers    Transfers sit-stand transfer;stand-sit transfer (P)   -       Row Name 09/26/23 1500          Sit-Stand Transfer    Sit-Stand Walker (Transfers) maximum assist (25% patient effort);verbal cues (P)   -RH     Assistive Device (Sit-Stand Transfers) walker, front-wheeled (P)   -RH     Comment, (Sit-Stand Transfer) Patient was able to perfrom transfer training related to sitting to and from standing. He required verbal and tactile cues for proper performance and was able to complete 5 rounds of sit to stands before becoming too fatigued to continue. (P)   -       Row Name 09/26/23 1500          Stand-Sit Transfer    Stand-Sit Walker (Transfers) maximum assist (25% patient effort);1 person assist (P)   -RH     Assistive Device (Stand-Sit Transfers) walker, front-wheeled (P)   -       Row Name 09/26/23 1500          Gait/Stairs (Locomotion)    Patient was able to Ambulate no, other medical factors prevent ambulation (P)   -RH     Reason Patient was unable to Ambulate Excessive Weakness;Uncontrolled Pain (P)   -RH       Row Name 09/26/23 1500          Balance    Balance Assessment standing static balance;sitting dynamic balance (P)   -RH     Dynamic Sitting Balance contact guard (P)   -RH     Position, Sitting Balance unsupported;sitting  edge of bed (P)   -RH     Static Standing Balance maximum assist (P)   -RH     Position/Device Used, Standing Balance supported;walker, front-wheeled (P)   -RH       Row Name 09/26/23 1500          Motor Skills    Therapeutic Exercise hip;knee;ankle;other (see comments) (P)   Ankle pumps, short arc quads, long arc quads, seated marches. Patient performed 2 sets of 10 reps for each exercise. All done bilaterally from a seated position on the EOB.  -RH       Row Name             Wound 09/10/23 2345 Left proximal arm Skin Tear    Wound - Properties Group Placement Date: 09/10/23  -BL Placement Time: 2345  -BL Present on Hospital Admission: Y  -BL Side: Left  -BL Orientation: proximal  -BL Location: arm  -BL Primary Wound Type: Skin tear  -BL, old healing skin tear     Retired Wound - Properties Group Placement Date: 09/10/23  -BL Placement Time: 2345  -BL Present on Hospital Admission: Y  -BL Side: Left  -BL Orientation: proximal  -BL Location: arm  -BL Primary Wound Type: Skin tear  -BL, old healing skin tear     Retired Wound - Properties Group Date first assessed: 09/10/23  -BL Time first assessed: 2345  -BL Present on Hospital Admission: Y  -BL Side: Left  -BL Location: arm  -BL Primary Wound Type: Skin tear  -BL, old healing skin tear       Row Name             Wound 09/10/23 2345 Left posterior foot Diabetic Ulcer    Wound - Properties Group Placement Date: 09/10/23  -BL Placement Time: 2345  -BL Side: Left  -BL Orientation: posterior  -BL Location: foot  -BL Primary Wound Type: Diabetic ulc  -BL    Retired Wound - Properties Group Placement Date: 09/10/23  -BL Placement Time: 2345  -BL Side: Left  -BL Orientation: posterior  -BL Location: foot  -BL Primary Wound Type: Diabetic ulc  -BL    Retired Wound - Properties Group Date first assessed: 09/10/23  -BL Time first assessed: 2345  -BL Side: Left  -BL Location: foot  -BL Primary Wound Type: Diabetic ulc  -BL      Row Name             Wound 09/10/23 2345 Left  distal calf Diabetic Ulcer    Wound - Properties Group Placement Date: 09/10/23  -BL Placement Time: 2345  -BL Side: Left  -BL Orientation: distal  -BL Location: calf  -BL Primary Wound Type: Diabetic ulc  -BL    Retired Wound - Properties Group Placement Date: 09/10/23  -BL Placement Time: 2345  -BL Side: Left  -BL Orientation: distal  -BL Location: calf  -BL Primary Wound Type: Diabetic ulc  -BL    Retired Wound - Properties Group Date first assessed: 09/10/23  -BL Time first assessed: 2345  -BL Side: Left  -BL Location: calf  -BL Primary Wound Type: Diabetic ulc  -BL      Row Name             Wound 09/10/23 2345 Right distal calf MASD (Moisture associated skin damage)    Wound - Properties Group Placement Date: 09/10/23  -BL Placement Time: 2345  -BL Side: Right  -BL Orientation: distal  -BL Location: calf  -BL Primary Wound Type: MASD  -BL    Retired Wound - Properties Group Placement Date: 09/10/23  -BL Placement Time: 2345  -BL Side: Right  -BL Orientation: distal  -BL Location: calf  -BL Primary Wound Type: MASD  -BL    Retired Wound - Properties Group Date first assessed: 09/10/23  -BL Time first assessed: 2345  -BL Side: Right  -BL Location: calf  -BL Primary Wound Type: MASD  -BL      Row Name             Wound 09/10/23 2345 Right anterior hip MASD (Moisture associated skin damage)    Wound - Properties Group Placement Date: 09/10/23  -BL Placement Time: 2345  -BL Present on Hospital Admission: Y  -BL Side: Right  -BL Orientation: anterior  -BL Location: hip  -BL Primary Wound Type: MASD  -BL    Retired Wound - Properties Group Placement Date: 09/10/23  -BL Placement Time: 2345  -BL Present on Hospital Admission: Y  -BL Side: Right  -BL Orientation: anterior  -BL Location: hip  -BL Primary Wound Type: MASD  -BL    Retired Wound - Properties Group Date first assessed: 09/10/23  -BL Time first assessed: 2345  -BL Present on Hospital Admission: Y  -BL Side: Right  -BL Location: hip  -BL Primary Wound Type:  MASD  -BL      Row Name             Wound 06/22/21 1133 Left lateral heel    Wound - Properties Group Placement Date: 06/22/21  -FABIOLA Placement Time: 1133  -FABIOLA Present on Hospital Admission: Y  -FABIOLA Side: Left  -FABIOLA Orientation: lateral  -FABIOLA Location: heel  -FABIOLA    Retired Wound - Properties Group Placement Date: 06/22/21  -FABIOLA Placement Time: 1133  -FABIOLA Present on Hospital Admission: Y  -FABIOLA Side: Left  -FABIOLA Orientation: lateral  -FABIOLA Location: heel  -FABIOLA    Retired Wound - Properties Group Date first assessed: 06/22/21  -FABIOLA Time first assessed: 1133  -FABIOLA Present on Hospital Admission: Y  -FABIOLA Side: Left  -FABIOLA Location: heel  -FABIOLA      Row Name             Wound 12/02/21 Right anterior foot Incision    Wound - Properties Group Placement Date: 12/02/21  -ES Side: Right  -ES Orientation: anterior  -ES Location: foot  -ES Primary Wound Type: Incision  -ES    Retired Wound - Properties Group Placement Date: 12/02/21  -ES Side: Right  -ES Orientation: anterior  -ES Location: foot  -ES Primary Wound Type: Incision  -ES    Retired Wound - Properties Group Date first assessed: 12/02/21  -ES Side: Right  -ES Location: foot  -ES Primary Wound Type: Incision  -ES      Row Name             Wound 09/15/23 Right gluteal    Wound - Properties Group Placement Date: 09/15/23  -NH Side: Right  -NH Location: gluteal  -NH    Retired Wound - Properties Group Placement Date: 09/15/23  -NH Side: Right  -NH Location: gluteal  -NH    Retired Wound - Properties Group Date first assessed: 09/15/23  -NH Side: Right  -NH Location: gluteal  -NH      Row Name 09/26/23 1500          Progress Summary (PT)    Progress Toward Functional Goals (PT) progress toward functional goals is fair (P)   -RH     Daily Progress Summary (PT) Patient tolerated all therapeutic exercise well today with no complaints. He was able to sit on the edge of the bed unsupported while doing his exercises and required verbal cues for proper performance. He also tolerated transfer training  well with complaints of increased left knee pain when standing. He still requires MaxA for sit to stand transfers and is unable to hold himself up for more than 30 seconds. (P)   -               User Key  (r) = Recorded By, (t) = Taken By, (c) = Cosigned By      Initials Name Provider Type    Sarah Bernstein, RN Registered Nurse    Dottie Foster, RN Registered Nurse    Marlen Vaughn, RN Registered Nurse    Katlyn Garcia RN Registered Nurse     Gamal Simmons, PT Student PT Student                    Physical Therapy Education       Title: PT OT SLP Therapies (Done)       Topic: Physical Therapy (Done)       Point: Mobility training (Done)       Learning Progress Summary             Patient Acceptance, E, VU,NR by  at 9/24/2023 0604    Acceptance, E, VU,DU by  at 9/18/2023 1537    Acceptance, E, VU,DU by  at 9/17/2023 1515    Acceptance, E, VU,DU by  at 9/16/2023 1804    Acceptance, E, VU by  at 9/13/2023 1059    Acceptance, E,TB, VU by  at 9/12/2023 1308                         Point: Home exercise program (Done)       Learning Progress Summary             Patient Acceptance, E, VU,NR by  at 9/24/2023 0604    Acceptance, E, VU,DU by  at 9/18/2023 1537    Acceptance, E, VU,DU by  at 9/17/2023 1515    Acceptance, E, VU,DU by  at 9/16/2023 1804    Acceptance, E, VU by  at 9/13/2023 1059    Acceptance, E,TB, VU by  at 9/12/2023 1308                         Point: Body mechanics (Done)       Learning Progress Summary             Patient Acceptance, E, VU,NR by  at 9/24/2023 0604    Acceptance, E, VU,DU by  at 9/18/2023 1537    Acceptance, E, VU,DU by  at 9/17/2023 1515    Acceptance, E, VU,DU by  at 9/16/2023 1804    Acceptance, E, VU by AV at 9/13/2023 1059    Acceptance, E,TB, VU by  at 9/12/2023 1308                         Point: Precautions (Done)       Learning Progress Summary             Patient Acceptance, E, VU,NR by RASHARD at 9/24/2023 0684     Acceptance, E, VU,DU by RF at 9/18/2023 1537    Acceptance, E, VU,DU by RF at 9/17/2023 1515    Acceptance, E, VU,DU by RF at 9/16/2023 1804    Acceptance, E, VU by AV at 9/13/2023 1059    Acceptance, E,TB, VU by  at 9/12/2023 1308                                         User Key       Initials Effective Dates Name Provider Type Discipline     06/16/21 -  Selena Lindquist, RONNIE Registered Nurse Nurse    AV 06/16/21 -  Uvaldo Christine OT Occupational Therapist OT     01/19/22 -  Karl Baker, RN Registered Nurse Nurse     08/16/23 -  Gamal Simmons, MERLIN Student PT Student PT                  PT Recommendation and Plan  Anticipated Discharge Disposition (PT): inpatient rehabilitation facility  Planned Therapy Interventions (PT): balance training, bed mobility training, gait training, home exercise program, neuromuscular re-education, motor coordination training, ROM (range of motion), stair training, strengthening, stretching, transfer training  Therapy Frequency (PT): daily  Progress Summary (PT)  Progress Toward Functional Goals (PT): (P) progress toward functional goals is fair  Daily Progress Summary (PT): (P) Patient tolerated all therapeutic exercise well today with no complaints. He was able to sit on the edge of the bed unsupported while doing his exercises and required verbal cues for proper performance. He also tolerated transfer training well with complaints of increased left knee pain when standing. He still requires MaxA for sit to stand transfers and is unable to hold himself up for more than 30 seconds.  Plan of Care Reviewed With: patient  Outcome Evaluation: Patient presents with severe bilateral LE strength deficits and endurance impairments that have affected his functional mobility status below his baseline. He reports that he has been NWB for weeks but was able to get around independently with an assistive device prior to that. Skilled physical therapy is required to address his  deficits.   Outcome Measures       Row Name 09/25/23 1500 09/24/23 1007          How much help from another person do you currently need...    Turning from your back to your side while in flat bed without using bedrails? 2  -MOE (r) ZT (t) MOE (c) 2  -DK     Moving from lying on back to sitting on the side of a flat bed without bedrails? 2  -MOE (r) ZT (t) MOE (c) 2  -DK     Moving to and from a bed to a chair (including a wheelchair)? 1  -MOE (r) ZT (t) MOE (c) 1  -DK     Standing up from a chair using your arms (e.g., wheelchair, bedside chair)? 1  -MOE (r) ZT (t) MOE (c) 1  -DK     Climbing 3-5 steps with a railing? 1  -MOE (r) ZT (t) MOE (c) 1  -DK     To walk in hospital room? 1  -MOE (r) ZT (t) MOE (c) 1  -DK     AM-PAC 6 Clicks Score (PT) 8  -MOE (r) ZT (t) 8  -DK        Functional Assessment    Outcome Measure Options -- AM-PAC 6 Clicks Basic Mobility (PT)  -DK               User Key  (r) = Recorded By, (t) = Taken By, (c) = Cosigned By      Initials Name Provider Type    DK Mckenna Wong, PTA Physical Therapist Assistant    Merlin De La O, PT Physical Therapist    Oscar Ward, PT Student PT Student                     Time Calculation:    PT Charges       Row Name 09/26/23 1524             Time Calculation    PT Received On 09/26/23 (P)   -RH         Timed Charges    27666 - PT Therapeutic Exercise Minutes 10 (P)   -RH      35318 - PT Therapeutic Activity Minutes 15 (P)   -RH         Total Minutes    Timed Charges Total Minutes 25 (P)   -RH       Total Minutes 25 (P)   -RH                User Key  (r) = Recorded By, (t) = Taken By, (c) = Cosigned By      Initials Name Provider Type     Gamal Simmons, PT Student PT Student                      PT G-Codes  Outcome Measure Options: AM-PAC 6 Clicks Basic Mobility (PT)  AM-PAC 6 Clicks Score (PT): 8  AM-PAC 6 Clicks Score (OT): 17    Gamal Simmons, PT Student  9/26/2023

## 2023-09-27 LAB
ALBUMIN SERPL-MCNC: 3.4 G/DL (ref 3.5–5.2)
ALBUMIN/GLOB SERPL: 1.1 G/DL
ALP SERPL-CCNC: 198 U/L (ref 39–117)
ALT SERPL W P-5'-P-CCNC: 50 U/L (ref 1–41)
ANION GAP SERPL CALCULATED.3IONS-SCNC: 7.9 MMOL/L (ref 5–15)
AST SERPL-CCNC: 55 U/L (ref 1–40)
BASOPHILS # BLD AUTO: 0.04 10*3/MM3 (ref 0–0.2)
BASOPHILS NFR BLD AUTO: 0.7 % (ref 0–1.5)
BILIRUB SERPL-MCNC: 1 MG/DL (ref 0–1.2)
BUN SERPL-MCNC: 14 MG/DL (ref 8–23)
BUN/CREAT SERPL: 28 (ref 7–25)
CALCIUM SPEC-SCNC: 9 MG/DL (ref 8.6–10.5)
CHLORIDE SERPL-SCNC: 104 MMOL/L (ref 98–107)
CO2 SERPL-SCNC: 24.1 MMOL/L (ref 22–29)
CREAT SERPL-MCNC: 0.5 MG/DL (ref 0.76–1.27)
DEPRECATED RDW RBC AUTO: 52.7 FL (ref 37–54)
EGFRCR SERPLBLD CKD-EPI 2021: 113.9 ML/MIN/1.73
EOSINOPHIL # BLD AUTO: 0.1 10*3/MM3 (ref 0–0.4)
EOSINOPHIL NFR BLD AUTO: 1.7 % (ref 0.3–6.2)
ERYTHROCYTE [DISTWIDTH] IN BLOOD BY AUTOMATED COUNT: 17.9 % (ref 12.3–15.4)
GLOBULIN UR ELPH-MCNC: 3 GM/DL
GLUCOSE BLDC GLUCOMTR-MCNC: 161 MG/DL (ref 70–99)
GLUCOSE BLDC GLUCOMTR-MCNC: 180 MG/DL (ref 70–99)
GLUCOSE BLDC GLUCOMTR-MCNC: 213 MG/DL (ref 70–99)
GLUCOSE BLDC GLUCOMTR-MCNC: 287 MG/DL (ref 70–99)
GLUCOSE SERPL-MCNC: 197 MG/DL (ref 65–99)
HCT VFR BLD AUTO: 38.9 % (ref 37.5–51)
HGB BLD-MCNC: 12.2 G/DL (ref 13–17.7)
IMM GRANULOCYTES # BLD AUTO: 0.02 10*3/MM3 (ref 0–0.05)
IMM GRANULOCYTES NFR BLD AUTO: 0.3 % (ref 0–0.5)
LYMPHOCYTES # BLD AUTO: 1.34 10*3/MM3 (ref 0.7–3.1)
LYMPHOCYTES NFR BLD AUTO: 22.3 % (ref 19.6–45.3)
MAGNESIUM SERPL-MCNC: 1.9 MG/DL (ref 1.6–2.4)
MCH RBC QN AUTO: 25.3 PG (ref 26.6–33)
MCHC RBC AUTO-ENTMCNC: 31.4 G/DL (ref 31.5–35.7)
MCV RBC AUTO: 80.5 FL (ref 79–97)
MONOCYTES # BLD AUTO: 0.57 10*3/MM3 (ref 0.1–0.9)
MONOCYTES NFR BLD AUTO: 9.5 % (ref 5–12)
NEUTROPHILS NFR BLD AUTO: 3.95 10*3/MM3 (ref 1.7–7)
NEUTROPHILS NFR BLD AUTO: 65.5 % (ref 42.7–76)
NRBC BLD AUTO-RTO: 0 /100 WBC (ref 0–0.2)
PHOSPHATE SERPL-MCNC: 3.6 MG/DL (ref 2.5–4.5)
PLATELET # BLD AUTO: 104 10*3/MM3 (ref 140–450)
PMV BLD AUTO: 11.4 FL (ref 6–12)
POTASSIUM SERPL-SCNC: 3.9 MMOL/L (ref 3.5–5.2)
PROT SERPL-MCNC: 6.4 G/DL (ref 6–8.5)
RBC # BLD AUTO: 4.83 10*6/MM3 (ref 4.14–5.8)
SODIUM SERPL-SCNC: 136 MMOL/L (ref 136–145)
WBC NRBC COR # BLD: 6.02 10*3/MM3 (ref 3.4–10.8)

## 2023-09-27 PROCEDURE — 63710000001 INSULIN LISPRO (HUMAN) PER 5 UNITS: Performed by: FAMILY MEDICINE

## 2023-09-27 PROCEDURE — 63710000001 INSULIN DETEMIR PER 5 UNITS: Performed by: INTERNAL MEDICINE

## 2023-09-27 PROCEDURE — 97110 THERAPEUTIC EXERCISES: CPT

## 2023-09-27 PROCEDURE — 80053 COMPREHEN METABOLIC PANEL: CPT | Performed by: INTERNAL MEDICINE

## 2023-09-27 PROCEDURE — 99232 SBSQ HOSP IP/OBS MODERATE 35: CPT | Performed by: INTERNAL MEDICINE

## 2023-09-27 PROCEDURE — 82948 REAGENT STRIP/BLOOD GLUCOSE: CPT

## 2023-09-27 PROCEDURE — 83735 ASSAY OF MAGNESIUM: CPT | Performed by: INTERNAL MEDICINE

## 2023-09-27 PROCEDURE — 97116 GAIT TRAINING THERAPY: CPT

## 2023-09-27 PROCEDURE — 84100 ASSAY OF PHOSPHORUS: CPT | Performed by: INTERNAL MEDICINE

## 2023-09-27 PROCEDURE — 85025 COMPLETE CBC W/AUTO DIFF WBC: CPT | Performed by: INTERNAL MEDICINE

## 2023-09-27 RX ADMIN — CYANOCOBALAMIN TAB 500 MCG 1000 MCG: 500 TAB at 08:32

## 2023-09-27 RX ADMIN — INSULIN DETEMIR 40 UNITS: 100 INJECTION, SOLUTION SUBCUTANEOUS at 20:50

## 2023-09-27 RX ADMIN — BACLOFEN 10 MG: 10 TABLET ORAL at 20:49

## 2023-09-27 RX ADMIN — PREGABALIN 50 MG: 25 CAPSULE ORAL at 08:32

## 2023-09-27 RX ADMIN — PREGABALIN 50 MG: 25 CAPSULE ORAL at 16:51

## 2023-09-27 RX ADMIN — HYDROCODONE BITARTRATE AND ACETAMINOPHEN 1 TABLET: 7.5; 325 TABLET ORAL at 15:12

## 2023-09-27 RX ADMIN — HYDROCODONE BITARTRATE AND ACETAMINOPHEN 1 TABLET: 7.5; 325 TABLET ORAL at 05:44

## 2023-09-27 RX ADMIN — DIPHENOXYLATE HYDROCHLORIDE AND ATROPINE SULFATE 1 TABLET: 2.5; .025 TABLET ORAL at 05:43

## 2023-09-27 RX ADMIN — INSULIN LISPRO 2 UNITS: 100 INJECTION, SOLUTION INTRAVENOUS; SUBCUTANEOUS at 16:55

## 2023-09-27 RX ADMIN — ATORVASTATIN CALCIUM 10 MG: 10 TABLET, FILM COATED ORAL at 20:50

## 2023-09-27 RX ADMIN — INSULIN LISPRO 2 UNITS: 100 INJECTION, SOLUTION INTRAVENOUS; SUBCUTANEOUS at 08:31

## 2023-09-27 RX ADMIN — FAMOTIDINE 40 MG: 20 TABLET, FILM COATED ORAL at 08:33

## 2023-09-27 RX ADMIN — INSULIN LISPRO 6 UNITS: 100 INJECTION, SOLUTION INTRAVENOUS; SUBCUTANEOUS at 20:50

## 2023-09-27 RX ADMIN — PREGABALIN 50 MG: 25 CAPSULE ORAL at 20:49

## 2023-09-27 RX ADMIN — DIPHENOXYLATE HYDROCHLORIDE AND ATROPINE SULFATE 1 TABLET: 2.5; .025 TABLET ORAL at 20:49

## 2023-09-27 RX ADMIN — MICONAZOLE NITRATE 1 APPLICATION: 2 POWDER TOPICAL at 20:51

## 2023-09-27 RX ADMIN — INSULIN LISPRO 4 UNITS: 100 INJECTION, SOLUTION INTRAVENOUS; SUBCUTANEOUS at 14:07

## 2023-09-27 RX ADMIN — INSULIN DETEMIR 40 UNITS: 100 INJECTION, SOLUTION SUBCUTANEOUS at 08:32

## 2023-09-27 RX ADMIN — HYDROCODONE BITARTRATE AND ACETAMINOPHEN 1 TABLET: 7.5; 325 TABLET ORAL at 20:49

## 2023-09-27 RX ADMIN — APIXABAN 5 MG: 5 TABLET, FILM COATED ORAL at 08:32

## 2023-09-27 RX ADMIN — Medication: at 09:30

## 2023-09-27 RX ADMIN — APIXABAN 5 MG: 5 TABLET, FILM COATED ORAL at 20:49

## 2023-09-27 RX ADMIN — MICONAZOLE NITRATE 1 APPLICATION: 2 POWDER TOPICAL at 08:29

## 2023-09-27 RX ADMIN — BACLOFEN 10 MG: 10 TABLET ORAL at 08:37

## 2023-09-27 NOTE — PROGRESS NOTES
[facility]   Hospitalist Progress Note  Date: 2023  Patient Name: Jose Shaikh  : 1960  MRN: [id number]  Date of admission: 9/10/2023  Room/Bed: 4027/      Subjective   Subjective     Chief Complaint: Weakness    Summary:Jose Shaikh is a 62 y.o. male with multiple medical problems [facility].  He stated that he could barely move or get out of his car so EMS was called.  He said that due to nonweightbearing restrictions he was unable to work on his lower extremity weakness at rehab and was unable to care for himself upon discharge.  Patient had hip and knee injection in hopes that this would help with his pain, he is working better with physical therapy.  He is still unable to walk at this time and  is trying to arrange rehab placement.    Interval Followup: No acute events overnight, patient did stand with physical therapy, still complaining of significant knee and hip pain albeit improving    Objective   Objective     Vitals:   Temp:  [97.7 °F (36.5 °C)-100.2 °F (37.9 °C)] 97.7 °F (36.5 °C)  Heart Rate:  [79-85] 81  Resp:  [16-20] 18  BP: (114-147)/(54-85) 147/69    Physical Exam   GEN: No acute distress  HEENT: Moist mucous membranes  LUNGS: Equal chest rise bilaterally  CARDIAC: Regular rate and rhythm  NEURO: Moving all 4 extremities spontaneously  SKIN: No obvious breakdown    Result Review    Result Review:  I have personally reviewed these results:  [x]  Laboratory      Lab 23  0523  0954   WBC 6.02 5.44 6.14   HEMOGLOBIN 12.2* 11.7* 11.6*   HEMATOCRIT 38.9 37.1* 36.9*   PLATELETS 104* 106* 95*   NEUTROS ABS 3.95 3.56 4.86   IMMATURE GRANS (ABS) 0.02 0.02 0.01   LYMPHS ABS 1.34 1.21 0.86   MONOS ABS 0.57 0.52 0.38   EOS ABS 0.10 0.08 0.01   MCV 80.5 81.5 80.9           Lab 23  0529 23  0954   SODIUM 136 138 134*   POTASSIUM 3.9 4.3 4.4   CHLORIDE 104 103 102    CO2 24.1 24.8 24.2   ANION GAP 7.9 10.2 7.8   BUN 14 15 10   CREATININE 0.50* 0.54* 0.52*   EGFR 113.9 111.3 112.6   GLUCOSE 197* 246* 307*   CALCIUM 9.0 8.7 8.8   MAGNESIUM 1.9 1.9 1.8   PHOSPHORUS 3.6 3.6  --            Lab 09/27/23  0449 09/25/23  0529 09/23/23  0954   TOTAL PROTEIN 6.4 6.4 6.4   ALBUMIN 3.4* 3.2* 3.2*   GLOBULIN 3.0 3.2 3.2   ALT (SGPT) 50* 40 32   AST (SGOT) 55* 52* 40   BILIRUBIN 1.0 0.7 0.9   ALK PHOS 198* 189* 193*                         Brief Urine Lab Results  (Last result in the past 365 days)        Color   Clarity   Blood   Leuk Est   Nitrite   Protein   CREAT   Urine HCG        09/11/23 2200 Dark Yellow   Clear   Negative   Negative   Negative   Negative                 [x]  Microbiology   Microbiology Results (last 10 days)       ** No results found for the last 240 hours. **          [x]  Radiology  FL Guide For Pain Meds Inj    Result Date: 9/22/2023   Successful steroid joint injection of the left hip was performed without complication, patient tolerated procedure.  The patient was instructed to obtain follow up care from the referring physician.     Exam directly supervised by Dr. Cole WEINER       Electronically Signed and Approved By: Slava Galvan M.D. on 9/22/2023 at 16:42            []  EKG/Telemetry   []  Cardiology/Vascular   []  Pathology  []  Old records  []  Other:    Assessment & Plan   Assessment / Plan     Assessment:  Generalized weakness  Diabetes  Low sodium likely a lab error given drastic change this morning  Hypertension  History of atrial fibrillation  Morbid obesity  Debility with wheelchair dependent  Chronic wound  Plan:  Patient placed back in the hospital  Continue PT/OT  Continue current medications  Continue as needed Lomotil  Continue wound care as needed  Status post IR guided hip injection and knee injection by orthopedic surgery  Vitals reviewed  Continue increased dose of Lyrica for more neuropathic pain control  Orthopedic surgery  consulted, patient status post knee injection  Wants double portions for breakfast  CBC, CMP reviewed  Continue increased dose of Levemir for better glucose control, tolerating  Awaiting rehab arrangements.    Reviewed patients labs and imaging, and discussed with patient and nurse at bedside.    DVT prophylaxis:  Medical DVT prophylaxis orders are present.    CODE STATUS:       Electronically signed by Max Mehta MD, 09/27/23, 1:00 PM EDT.

## 2023-09-27 NOTE — PLAN OF CARE
Goal Outcome Evaluation:  VSS. Low grade temperature earlier this am. A&P x4. 2+ edema BLE's. Abdomen soft, non-tender, +bowel sounds, BM today.  IV access lost this morning, okay to leave out per Dr. Mehta.

## 2023-09-27 NOTE — THERAPY TREATMENT NOTE
Acute Care - Physical Therapy Treatment Note  KEO Dominguez     Patient Name: Jose Shaikh  : 1960  MRN: [id number]  Today's Date: 2023      Visit Dx:     ICD-10-CM ICD-9-CM   1. Generalized weakness  R53.1 780.79   2. Hyponatremia  E87.1 276.1   3. Difficulty in walking  R26.2 719.7   4. Decreased activities of daily living (ADL)  Z78.9 V49.89   5. Difficulty walking  R26.2 719.7     Patient Active Problem List   Diagnosis    Diabetic ulcer of left heel associated with type 2 DM    Acute osteomyelitis of left calcaneus     Diabetic ulcer of left heel associated with type 2 DM    Diabetic ulcer of right midfoot associated with type 2 DM    Paroxysmal atrial fibrillation    Essential hypertension    Hyperlipidemia LDL goal <100    Cellulitis and abscess of foot    High alkaline phosphatase level    Osteomyelitis    Onychomycosis    Onychocryptosis    Foot pain, bilateral    Osteomyelitis of foot, right, acute    Cellulitis of right foot    Type 2 diabetes mellitus, with long-term current use of insulin    Class 3 severe obesity due to excess calories with serious comorbidity and body mass index (BMI) of 45.0 to 49.9 in adult    Anxiety disorder, unspecified    Claustrophobia    Dependence on wheelchair    Depression, unspecified    Long term (current) use of anticoagulants    Long term (current) use of oral hypoglycemic drugs    Wound of foot    Non-prs chronic ulcer oth prt r foot limited to brkdwn skin    Orthostatic hypotension    Other chronic osteomyelitis, right ankle and foot    Personal history of nicotine dependence    Thrombocytopenia, unspecified    Unspecified open wound, right foot, initial encounter    Diabetic foot infection    Subacute osteomyelitis of right foot    Right foot pain    Sepsis    Onychomycosis    Foot pain, left    Impaired mobility and ADLs    Absence of toe of right foot    Corns and callosity    Disability of walking    Fracture    Limb swelling    Polyneuropathy     Pressure ulcer, stage 1    Shortness of breath    Generalized weakness     Past Medical History:   Diagnosis Date    Absence of toe of right foot     Acute osteomyelitis of left calcaneus  8/18/2021    Anxiety and depression     Arthritis     Claustrophobia     Corns and callus     Diabetic ulcer of left heel associated with type 2 DM 8/18/2021    Diabetic ulcer of left heel associated with type 2 DM 7/6/2021    Diabetic ulcer of right midfoot associated with type 2 DM 8/18/2021    Difficulty walking     Essential hypertension 8/31/2021    Hammertoe     Hyperlipidemia LDL goal <100 8/31/2021    Ingrown toenail     Obesity     Paroxysmal atrial fibrillation 8/31/2021    Polyneuropathy     Pressure ulcer, stage 1     Tinea unguium     Type 2 diabetes mellitus with polyneuropathy      Past Surgical History:   Procedure Laterality Date    CYST REMOVAL      center of back; benign    INCISION AND DRAINAGE ABSCESS      back    INCISION AND DRAINAGE LEG Right 12/10/2021    Procedure: INCISION AND DRAINAGE LOWER EXTREMITY;  Surgeon: Ash Leyva DPM;  Location: [facility];  Service: Podiatry;  Laterality: Right;    OTHER SURGICAL HISTORY      Surgical clips left foot    TOE SURGERY Right     Removal of 5th toe    TRANS METATARSAL AMPUTATION Right 12/2/2021    Procedure: AMPUTATION TRANS METATARSAL;  Surgeon: Ash Leyva DPM;  Location: [facility] OR;  Service: Podiatry;  Laterality: Right;    WRIST SURGERY Left     repair of injury     PT Assessment (last 12 hours)       PT Evaluation and Treatment       Row Name 09/27/23 1500          Physical Therapy Time and Intention    Subjective Information complains of;weakness;fatigue;pain (P)   -ZT     Document Type therapy note (daily note) (P)   -ZT     Mode of Treatment individual therapy;physical therapy (P)   -ZT     Patient Effort good (P)   -ZT       Row Name 09/27/23 1500          General Information    Patient Profile Reviewed yes (P)   -ZT      Patient Observations alert;cooperative;agree to therapy (P)   -ZT     Prior Level of Function independent:;ADL's (P)   -ZT     Existing Precautions/Restrictions fall (P)   -       Row Name 09/27/23 1500          Bed Mobility    Bed Mobility bed mobility (all) activities (P)   -ZT     All Activities, [place] (Bed Mobility) moderate assist (50% patient effort) (P)   -ZT     Assistive Device (Bed Mobility) bed rails;overhead trapeze (P)   -ZT       Row Name 09/27/23 1500          Transfers    Transfers sit-stand transfer;stand-sit transfer (P)   -[facility] Name 09/27/23 1500          Sit-Stand Transfer    Sit-Stand [place] (Transfers) maximum assist (25% patient effort) (P)   -ZT     Assistive Device (Sit-Stand Transfers) walker, front-wheeled (P)   -[facility] Name 09/27/23 1500          Stand-Sit Transfer    Stand-Sit [place] (Transfers) maximum assist (25% patient effort) (P)   -ZT     Assistive Device (Stand-Sit Transfers) walker, front-wheeled (P)   -[facility] Name 09/27/23 1500          Gait/Stairs (Locomotion)    Gait/Stairs Locomotion gait/ambulation assistive device (P)   -ZT     [place] Level (Gait) maximum assist (25% patient effort) (P)   -ZT     Assistive Device (Gait) walker, front-wheeled (P)   -ZT     Distance in Feet (Gait) -- (P)   2 steps forward, 2 steps back  -       Row Name 09/27/23 1500          Safety Issues, Functional Mobility    Impairments Affecting Function (Mobility) balance;endurance/activity tolerance;pain;strength (P)   -[facility] Name 09/27/23 1500          Balance    Balance Assessment standing dynamic balance (P)   -ZT     Dynamic Standing Balance maximum assist (P)   -ZT     Position/Device Used, Standing Balance walker, front-wheeled (P)   -       Row Name 09/27/23 1500          Motor Skills    Therapeutic Exercise hip;knee (P)   -[facility] Name 09/27/23 1500          Hip (Therapeutic Exercise)    Hip Strengthening (Therapeutic Exercise)  marching while seated;other (see comments) (P)   Pt performed 40 reps of marches bilaterally seated on EOB  -ZT       Row Name 09/27/23 1500          Knee (Therapeutic Exercise)    Knee Strengthening (Therapeutic Exercise) SLR (straight leg raise);other (see comments) (P)   Pt performed 40 reps of SLR seated on EOB  -ZT       Row Name             Wound 09/10/23 2345 Left proximal arm Skin Tear    Wound - Properties Group Placement Date: 09/10/23  -BL Placement Time: 2345  -BL Present on Hospital Admission: Y  -BL Side: Left  -BL Orientation: proximal  -BL Location: arm  -BL Primary Wound Type: Skin tear  -BL, old healing skin tear     Retired Wound - Properties Group Placement Date: 09/10/23  -BL Placement Time: 2345  -BL Present on Hospital Admission: Y  -BL Side: Left  -BL Orientation: proximal  -BL Location: arm  -BL Primary Wound Type: Skin tear  -BL, old healing skin tear     Retired Wound - Properties Group Date first assessed: 09/10/23  -BL Time first assessed: 2345  -BL Present on Hospital Admission: Y  -BL Side: Left  -BL Location: arm  -BL Primary Wound Type: Skin tear  -BL, old healing skin tear       Row Name             Wound 09/10/23 2345 Left posterior foot Diabetic Ulcer    Wound - Properties Group Placement Date: 09/10/23  -BL Placement Time: 2345  -BL Side: Left  -BL Orientation: posterior  -BL Location: foot  -BL Primary Wound Type: Diabetic ulc  -BL    Retired Wound - Properties Group Placement Date: 09/10/23  -BL Placement Time: 2345  -BL Side: Left  -BL Orientation: posterior  -BL Location: foot  -BL Primary Wound Type: Diabetic ulc  -BL    Retired Wound - Properties Group Date first assessed: 09/10/23  -BL Time first assessed: 2345  -BL Side: Left  -BL Location: foot  -BL Primary Wound Type: Diabetic ulc  -BL      Row Name             Wound 09/10/23 2345 Left distal calf Diabetic Ulcer    Wound - Properties Group Placement Date: 09/10/23  -BL Placement Time: 2345  -BL Side: Left  -BL  Orientation: distal  -BL Location: calf  -BL Primary Wound Type: Diabetic ulc  -BL    Retired Wound - Properties Group Placement Date: 09/10/23  -BL Placement Time: 2345  -BL Side: Left  -BL Orientation: distal  -BL Location: calf  -BL Primary Wound Type: Diabetic ulc  -BL    Retired Wound - Properties Group Date first assessed: 09/10/23  -BL Time first assessed: 2345  -BL Side: Left  -BL Location: calf  -BL Primary Wound Type: Diabetic ulc  -BL      Row Name             Wound 09/10/23 2345 Right distal calf MASD (Moisture associated skin damage)    Wound - Properties Group Placement Date: 09/10/23  -BL Placement Time: 2345  -BL Side: Right  -BL Orientation: distal  -BL Location: calf  -BL Primary Wound Type: MASD  -BL    Retired Wound - Properties Group Placement Date: 09/10/23  -BL Placement Time: 2345  -BL Side: Right  -BL Orientation: distal  -BL Location: calf  -BL Primary Wound Type: MASD  -BL    Retired Wound - Properties Group Date first assessed: 09/10/23  -BL Time first assessed: 2345  -BL Side: Right  -BL Location: calf  -BL Primary Wound Type: MASD  -BL      Row Name             Wound 09/10/23 2345 Right anterior hip MASD (Moisture associated skin damage)    Wound - Properties Group Placement Date: 09/10/23  -BL Placement Time: 2345  -BL Present on Hospital Admission: Y  -BL Side: Right  -BL Orientation: anterior  -BL Location: hip  -BL Primary Wound Type: MASD  -BL    Retired Wound - Properties Group Placement Date: 09/10/23  -BL Placement Time: 2345  -BL Present on Hospital Admission: Y  -BL Side: Right  -BL Orientation: anterior  -BL Location: hip  -BL Primary Wound Type: MASD  -BL    Retired Wound - Properties Group Date first assessed: 09/10/23  -BL Time first assessed: 2345  -BL Present on Hospital Admission: Y  -BL Side: Right  -BL Location: hip  -BL Primary Wound Type: MASD  -BL      Row Name             Wound 06/22/21 1133 Left lateral heel    Wound - Properties Group Placement Date: 06/22/21   -FABIOLA Placement Time: 1133  -FABIOLA Present on Hospital Admission: Y  -FABIOLA Side: Left  -FABIOLA Orientation: lateral  -FABIOLA Location: heel  -FABIOLA    Retired Wound - Properties Group Placement Date: 06/22/21  -FABIOLA Placement Time: 1133  -FABIOLA Present on Hospital Admission: Y  -FABIOLA Side: Left  -FABIOLA Orientation: lateral  -FABIOLA Location: heel  -FABIOLA    Retired Wound - Properties Group Date first assessed: 06/22/21  -FABIOLA Time first assessed: 1133  -FABIOLA Present on Hospital Admission: Y  -FABIOLA Side: Left  -FABIOLA Location: heel  -FABIOLA      Row Name             Wound 12/02/21 Right anterior foot Incision    Wound - Properties Group Placement Date: 12/02/21  -ES Side: Right  -ES Orientation: anterior  -ES Location: foot  -ES Primary Wound Type: Incision  -ES    Retired Wound - Properties Group Placement Date: 12/02/21  -ES Side: Right  -ES Orientation: anterior  -ES Location: foot  -ES Primary Wound Type: Incision  -ES    Retired Wound - Properties Group Date first assessed: 12/02/21  -ES Side: Right  -ES Location: foot  -ES Primary Wound Type: Incision  -ES      Row Name             Wound 09/15/23 Right gluteal    Wound - Properties Group Placement Date: 09/15/23  -NH Side: Right  -NH Location: gluteal  -NH    Retired Wound - Properties Group Placement Date: 09/15/23  -NH Side: Right  -NH Location: gluteal  -NH    Retired Wound - Properties Group Date first assessed: 09/15/23  -NH Side: Right  -NH Location: gluteal  -NH      Row Name 09/27/23 1500          Plan of Care Review    Plan of Care Reviewed With patient (P)   -ZT       Row Name 09/27/23 1500          Therapy Assessment/Plan (PT)    Rehab Potential (PT) good, to achieve stated therapy goals (P)   -ZT     Criteria for Skilled Interventions Met (PT) yes;skilled treatment is necessary (P)   -ZT     Therapy Frequency (PT) daily (P)   -ZT     Problem List (PT) problems related to;balance;coordination;strength;pain (P)   -ZT     Activity Limitations Related to Problem List (PT) unable to ambulate  safely;unable to transfer safely;BADLs not performed adequately or safely;IADLs not performed adequately or safely;community activities not performed adequately or safely (P)   -ZT       Row Name 09/27/23 1500          Progress Summary (PT)    Progress Toward Functional Goals (PT) progress toward functional goals is good (P)   -ZT     Daily Progress Summary (PT) Pt presents today with decreased endurance and decreased functional activity tolerance. He is unable to tolerate more than a few steps of AMB before his legs become too weak and his knee pain exacerbates. He requires mod to max assistance to move in and out of bed. He still requires skilled PT services to address these deficits. (P)   -ZT       Row Name 09/27/23 1500          Therapy Plan Review/Discharge Plan (PT)    Therapy Plan Review (PT) evaluation/treatment results reviewed;care plan/treatment goals reviewed;participants included;patient (P)   -ZT               User Key  (r) = Recorded By, (t) = Taken By, (c) = Cosigned By      Initials Name Provider Type    Sarah Bernstein, RN Registered Nurse    Dottie Foster, RN Registered Nurse    Marlen Vaughn, RN Registered Nurse    Katlyn Garcia RN Registered Nurse    Oscar Ward, PT Student PT Student                    Physical Therapy Education       Title: PT OT SLP Therapies (Done)       Topic: Physical Therapy (Done)       Point: Mobility training (Done)       Learning Progress Summary             Patient Acceptance, E, VU,NR by RASHARD at 9/24/2023 0604    Acceptance, E, VU,DU by RF at 9/18/2023 1537    Acceptance, E, VU,DU by RF at 9/17/2023 1515    Acceptance, E, VU,DU by RF at 9/16/2023 1804    Acceptance, E, VU by AV at 9/13/2023 1059    Acceptance, E,TB, VU by  at 9/12/2023 1308                         Point: Home exercise program (Done)       Learning Progress Summary             Patient Acceptance, E, VU,NR by RASHARD at 9/24/2023 0604    Acceptance, E, VU,DU by RF at 9/18/2023  1537    Acceptance, E, VU,DU by RF at 9/17/2023 1515    Acceptance, E, VU,DU by RF at 9/16/2023 1804    Acceptance, E, VU by AV at 9/13/2023 1059    Acceptance, E,TB, VU by  at 9/12/2023 1308                         Point: Body mechanics (Done)       Learning Progress Summary             Patient Acceptance, E, VU,NR by  at 9/24/2023 0604    Acceptance, E, VU,DU by RF at 9/18/2023 1537    Acceptance, E, VU,DU by RF at 9/17/2023 1515    Acceptance, E, VU,DU by RF at 9/16/2023 1804    Acceptance, E, VU by AV at 9/13/2023 1059    Acceptance, E,TB, VU by  at 9/12/2023 1308                         Point: Precautions (Done)       Learning Progress Summary             Patient Acceptance, E, VU,NR by  at 9/24/2023 0604    Acceptance, E, VU,DU by RF at 9/18/2023 1537    Acceptance, E, VU,DU by RF at 9/17/2023 1515    Acceptance, E, VU,DU by RF at 9/16/2023 1804    Acceptance, E, VU by AV at 9/13/2023 1059    Acceptance, E,TB, VU by  at 9/12/2023 1308                                         User Key       Initials Effective Dates Name Provider Type Discipline     06/16/21 -  Selena Lindquist, RN Registered Nurse Nurse     06/16/21 -  Uvaldo Christine OT Occupational Therapist OT     01/19/22 -  Karl Baker, RN Registered Nurse Nurse     08/16/23 -  Gamal Simmons, MERLIN Student PT Student PT                  PT Recommendation and Plan  Anticipated Discharge Disposition (PT): (P) inpatient rehabilitation facility  Planned Therapy Interventions (PT): (P) balance training, bed mobility training, gait training, stair training, strengthening, transfer training  Therapy Frequency (PT): (P) daily  Progress Summary (PT)  Progress Toward Functional Goals (PT): (P) progress toward functional goals is good  Daily Progress Summary (PT): (P) Pt presents today with decreased endurance and decreased functional activity tolerance. He is unable to tolerate more than a few steps of AMB before his legs become too weak and  his knee pain exacerbates. He requires mod to max assistance to move in and out of bed. He still requires skilled PT services to address these deficits.  Plan of Care Reviewed With: (P) patient   Outcome Measures       Row Name 09/27/23 1500 09/25/23 1500          How much help from another person do you currently need...    Turning from your back to your side while in flat bed without using bedrails? 2 (P)   -ZT 2  -MOE (r) ZT (t) MOE (c)     Moving from lying on back to sitting on the side of a flat bed without bedrails? 2 (P)   -ZT 2  -MOE (r) ZT (t) MOE (c)     Moving to and from a bed to a chair (including a wheelchair)? 1 (P)   -ZT 1  -MOE (r) ZT (t) MOE (c)     Standing up from a chair using your arms (e.g., wheelchair, bedside chair)? 1 (P)   -ZT 1  -MOE (r) ZT (t) MOE (c)     Climbing 3-5 steps with a railing? 1 (P)   -ZT 1  -MOE (r) ZT (t) MOE (c)     To walk in hospital room? 1 (P)   -ZT 1  -MOE (r) ZT (t) MOE (c)     AM-PAC 6 Clicks Score (PT) 8 (P)   -ZT 8  -MOE (r) ZT (t)               User Key  (r) = Recorded By, (t) = Taken By, (c) = Cosigned By      Initials Name Provider Type    Merlin De La O, PT Physical Therapist    ZT Oscar Shields, PT Student PT Student                     Time Calculation:    PT Charges       Row Name 09/27/23 1507             Time Calculation    PT Received On 09/27/23 (P)   -ZT      PT Goal Re-Cert Due Date 10/06/23 (P)   -ZT         Timed Charges    57802 - PT Therapeutic Exercise Minutes 15 (P)   -ZT      41285 - Gait Training Minutes  10 (P)   -ZT         Total Minutes    Timed Charges Total Minutes 25 (P)   -ZT       Total Minutes 25 (P)   -ZT                User Key  (r) = Recorded By, (t) = Taken By, (c) = Cosigned By      Initials Name Provider Type    sOcar Ward, PT Student PT Student                  Therapy Charges for Today       Code Description Service Date Service Provider Modifiers Qty    01554467229 HC PT THER PROC EA 15 MIN 9/27/2023 Oscar Shields, PT  Student GP 1    93276824276 HC GAIT TRAINING EA 15 MIN 9/27/2023 Oscar Shields, PT Student GP 1            PT G-Codes  Outcome Measure Options: AM-PAC 6 Clicks Basic Mobility (PT)  AM-PAC 6 Clicks Score (PT): (P) 8  AM-PAC 6 Clicks Score (OT): 17    Oscar Shields, PT Student  9/27/2023

## 2023-09-27 NOTE — DISCHARGE PLACEMENT REQUEST
"Sami Shaikh (62 y.o. Male)       Date of Birth   1960    Social Security Number       Address   [address] [facility] [address] [place] [id number]    Home Phone   [phone]    MRN   [id number]       Alevism   None    Marital Status                               Admission Date   9/10/23    Admission Type   Emergency    Admitting Provider   Levi Finney MD    Attending Provider   Max Mehta MD    Department, Room/Bed   [address], 4027/1       Discharge Date       Discharge Disposition       Discharge Destination                                 Attending Provider: Max Mehta MD    Allergies: Adhesive Tape    Isolation: None   Infection: None   Code Status: CPR    Ht: 188 cm (74\")   Wt: 151 kg (334 lb)    Admission Cmt: None   Principal Problem: Generalized weakness [R53.1]                   Active Insurance as of 9/10/2023       Primary Coverage       Payor Plan Insurance Group Employer/Plan Group    [place] [facility] MEDICARE REPLACEMENT UNITED [facility] DUAL COMPLETE MEDICARE REPLACEMENT [id number]       Payor Plan Address Payor Plan Phone Number Payor Plan Fax Number Effective Dates    [address] [phone]  5/1/2022 - None Entered    [facility] [address]         Subscriber Name Subscriber Birth Date Member ID       SAMI SHAIKH 1960 [id number]               Secondary Coverage       Payor Plan Insurance Group Employer/Plan Group    [place]        Payor Plan Address Payor Plan Phone Number Payor Plan Fax Number Effective Dates    [address] [phone]  6/22/2021 - None Entered    [facility] [id number]         Subscriber Name Subscriber Birth Date Member ID       SAMI SHAIKH 1960 [id number]                     Emergency Contacts        (Rel.) Home Phone Work Phone Mobile Phone    RAMONITA SHAIKH JR (Son) -- -- [phone]    MICHELLE SHAIKH (Daughter) -- -- [phone]    Michelle Chandra " (Friend) -- -- [phone]              Emergency Contact Information       Name Relation Home Work Mobile    RAMONITA SHAIKH JR Son   [phone]    MICHELLE SHAIKH Daughter   [phone]    Michelle Chandra Friend   [phone]          Insurance Information                  [facility] MEDICARE REPLACEMENT/[facility] DUAL COMPLETE MEDICARE REPLACEMENT Phone: [phone]    Subscriber: Jose Shaikh Subscriber#: [id number]    Group#: [id number] Precert#: --        [place] Phone: [phone]    Subscriber: Jose Shaikh Subscriber#: [id number]    Group#: -- Precert#: --             History & Physical        Levi Finney MD at 23 0237           [facility]   HISTORY AND PHYSICAL    Patient Name: Jose Shaikh  : 1960  MRN: [id number]  Primary Care Physician:  Delia Cervantes APRN  Date of admission: 9/10/2023    Subjective   Subjective     Chief Complaint: Generalized weakness    HPI:    Jose Shaikh is a 62 y.o. male with past medical history of diabetes with polyneuropathy and foot amputation, hypertension, chronic wounds, morbid obesity, anxiety/depression, and GERD presented via EMS with generalized weakness.  Patient was discharged from [facility] re[facility] yesterday and stated that he could barely move or get out of his car so EMS was called to bring him to the ED.  Patient states that due to nonweightbearing restrictions that was implemented by wound care during his rehab, physical therapy did not work on strengthening his left lower extremity.  Due to the restriction patient feels as though his rehab was not adequate resulted in him being unable to care for himself upon discharge.  In the ED patient's vitals were all within normal limits on arrival.  Labs showed that he did have significant hyponatremia with remaining labs being relatively unremarkable given his chronic conditions.  When asked he denied any recent fevers, chills, headaches, chest  pain, palpitation, shortness of breath, cough, abdominal pain, nausea, vomiting, diarrhea, constipation, dysuria, hematuria, hematochezia, melena, or anxiety.  Patient was admitted for further evaluation and treatment.    Review of Systems   All systems were reviewed and negative except for: As specified in HPI    Personal History     Past Medical History:   Diagnosis Date    Absence of toe of right foot     Acute osteomyelitis of left calcaneus  8/18/2021    Anxiety and depression     Arthritis     Claustrophobia     Corns and callus     Diabetic ulcer of left heel associated with type 2 DM 8/18/2021    Diabetic ulcer of left heel associated with type 2 DM 7/6/2021    Diabetic ulcer of right midfoot associated with type 2 DM 8/18/2021    Difficulty walking     Essential hypertension 8/31/2021    Hammertoe     Hyperlipidemia LDL goal <100 8/31/2021    Ingrown toenail     Obesity     Paroxysmal atrial fibrillation 8/31/2021    Polyneuropathy     Pressure ulcer, stage 1     Tinea unguium     Type 2 diabetes mellitus with polyneuropathy        Past Surgical History:   Procedure Laterality Date    CYST REMOVAL      center of back; benign    INCISION AND DRAINAGE ABSCESS      back    INCISION AND DRAINAGE LEG Right 12/10/2021    Procedure: INCISION AND DRAINAGE LOWER EXTREMITY;  Surgeon: Ash Leyva DPM;  Location: [facility];  Service: Podiatry;  Laterality: Right;    OTHER SURGICAL HISTORY      Surgical clips left foot    TOE SURGERY Right     Removal of 5th toe    TRANS METATARSAL AMPUTATION Right 12/2/2021    Procedure: AMPUTATION TRANS METATARSAL;  Surgeon: Ash Leyva DPM;  Location: [facility];  Service: Podiatry;  Laterality: Right;    WRIST SURGERY Left     repair of injury       Family History: family history includes Cancer in his father; Heart disease in his brother, father, and mother. Otherwise pertinent FHx was reviewed and not pertinent to current issue.    Social History:   reports that he quit smoking about 32 years ago. His smoking use included cigarettes. He has never used smokeless tobacco. He reports current alcohol use. He reports current drug use. Drug: Marijuana.    Home Medications:  Dulaglutide, HYDROcodone-acetaminophen, Insulin Lispro (1 Unit Dial), acetaminophen, apixaban, digoxin, insulin detemir, metFORMIN, metoprolol succinate XL, naloxone, pregabalin, and simvastatin      Allergies:  Allergies   Allergen Reactions    Adhesive Tape Rash       Objective   Objective     Vitals:   Temp:  [97.6 °F (36.4 °C)-98 °F (36.7 °C)] 98 °F (36.7 °C)  Heart Rate:  [] 83  Resp:  [18] 18  BP: ()/() 97/44  Physical Exam    Constitutional: Awake, alert   Eyes: PERRLA, sclerae anicteric, no conjunctival injection   HENT: NCAT, mucous membranes moist   Neck: Supple, no thyromegaly, no lymphadenopathy, trachea midline   Respiratory: Clear to auscultation bilaterally, nonlabored respirations    Cardiovascular: RRR, no murmurs, rubs, or gallops, palpable pedal pulses bilaterally   Gastrointestinal: Positive bowel sounds, soft, nontender, nondistended   Musculoskeletal: Transmetatarsal amputation, no clubbing or cyanosis to extremities   Psychiatric: Appropriate affect, cooperative   Neurologic: Oriented x 3, strength symmetric in all extremities, Cranial Nerves grossly intact to confrontation, speech clear   Skin: Healing lower extremity wounds    Result Review    Result Review:  I have personally reviewed the results from the time of this admission to 9/11/2023 02:37 EDT and agree with these findings:  [x]  Laboratory list / accordion  []  Microbiology  []  Radiology  [x]  EKG/Telemetry   []  Cardiology/Vascular   []  Pathology  []  Old records  []  Other:  Most notable findings include: Hyponatremia      Assessment & Plan   Assessment / Plan     Brief Patient Summary:  Jose Shaikh is a 62 y.o. male who ith past medical history of diabetes with polyneuropathy and foot  amputation, hypertension, chronic wounds, morbid obesity, anxiety/depression, and GERD presented via EMS with generalized weakness    Active Hospital Problems:  Active Hospital Problems    Diagnosis     **Generalized weakness     Type 2 diabetes mellitus, with long-term current use of insulin     Class 3 severe obesity due to excess calories with serious comorbidity and body mass index (BMI) of 45.0 to 49.9 in adult     Dependence on wheelchair     Foot pain, bilateral     Paroxysmal atrial fibrillation     Essential hypertension     Diabetic ulcer of left heel associated with type 2 DM      Plan:     Generalized weakness  -Admit to medical floor  -Discharged from inpatient rehab yesterday  -Patient unable to perform ADLs.   -Patient morbidly obese  -Fall precautions  -PT OT  -Wound care  -Case management consulted for likely placement  -Supportive care    Hyponatremia  -Patient euvolemic, asymptomatic  -Glucose mildly elevated  -Superimposed hypochloremia  -EKG reviewed  -Urinalysis pending  -Admits to diarrhea  -NS IVF for now  -Further work-up per clinical course  -Follow labs    DM2  -ISS  -Titrate as needed    HTN  -Currently well controlled  -PRN BP meds  -Resume home meds when available  -Titrate if needed    Hx A-fib  -Resume home AC when available    Hx CKD  Hx CAD  Hx Morbid Obesity  Hx Chronic Wound: Wound care consulted  Debility/Wheelchair dependence     GI ppx  DVT ppx      DVT prophylaxis:  No DVT prophylaxis order currently exists.    CODE STATUS:       Admission Status:  I believe this patient meets inpatient status.      Electronically signed by Levi Finney MD, 23, 2:37 AM EDT.    Electronically signed by Levi Finney MD at 23 0654          Physician Progress Notes (most recent note)        Max Mehta MD at 23 1259           [facility]  Date: 2023  Patient Name: Jose Shaikh  : 1960  MRN: [id number]  Date of  admission: 9/10/2023  Room/Bed: 4027/1      Subjective   Subjective     Chief Complaint: Weakness    Summary:Jose Shaikh is a 62 y.o. male with multiple medical problems [facility].  He stated that he could barely move or get out of his car so EMS was called.  He said that due to nonweightbearing restrictions he was unable to work on his lower extremity weakness at rehab and was unable to care for himself upon discharge.  Patient had hip and knee injection in hopes that this would help with his pain, he is working better with physical therapy.  He is still unable to walk at this time and  is trying to arrange rehab placement.    Interval Followup: No acute events overnight, patient did stand with physical therapy, still complaining of significant knee and hip pain albeit improving    Objective   Objective     Vitals:   Temp:  [97.7 °F (36.5 °C)-100.2 °F (37.9 °C)] 97.7 °F (36.5 °C)  Heart Rate:  [79-85] 81  Resp:  [16-20] 18  BP: (114-147)/(54-85) 147/69    Physical Exam   GEN: No acute distress  HEENT: Moist mucous membranes  LUNGS: Equal chest rise bilaterally  CARDIAC: Regular rate and rhythm  NEURO: Moving all 4 extremities spontaneously  SKIN: No obvious breakdown    Result Review    Result Review:  I have personally reviewed these results:  [x]  Laboratory      Lab 09/27/23  0449 09/25/23  0529 09/23/23  0954   WBC 6.02 5.44 6.14   HEMOGLOBIN 12.2* 11.7* 11.6*   HEMATOCRIT 38.9 37.1* 36.9*   PLATELETS 104* 106* 95*   NEUTROS ABS 3.95 3.56 4.86   IMMATURE GRANS (ABS) 0.02 0.02 0.01   LYMPHS ABS 1.34 1.21 0.86   MONOS ABS 0.57 0.52 0.38   EOS ABS 0.10 0.08 0.01   MCV 80.5 81.5 80.9           Lab 09/27/23  0449 09/25/23  0529 09/23/23  0954   SODIUM 136 138 134*   POTASSIUM 3.9 4.3 4.4   CHLORIDE 104 103 102   CO2 24.1 24.8 24.2   ANION GAP 7.9 10.2 7.8   BUN 14 15 10   CREATININE 0.50* 0.54* 0.52*   EGFR 113.9 111.3 112.6   GLUCOSE 197* 246*  307*   CALCIUM 9.0 8.7 8.8   MAGNESIUM 1.9 1.9 1.8   PHOSPHORUS 3.6 3.6  --            Lab 09/27/23  0449 09/25/23  0529 09/23/23  0954   TOTAL PROTEIN 6.4 6.4 6.4   ALBUMIN 3.4* 3.2* 3.2*   GLOBULIN 3.0 3.2 3.2   ALT (SGPT) 50* 40 32   AST (SGOT) 55* 52* 40   BILIRUBIN 1.0 0.7 0.9   ALK PHOS 198* 189* 193*                         Brief Urine Lab Results  (Last result in the past 365 days)        Color   Clarity   Blood   Leuk Est   Nitrite   Protein   CREAT   Urine HCG        09/11/23 2200 Dark Yellow   Clear   Negative   Negative   Negative   Negative                 [x]  Microbiology   Microbiology Results (last 10 days)       ** No results found for the last 240 hours. **          [x]  Radiology  FL Guide For Pain Meds Inj    Result Date: 9/22/2023   Successful steroid joint injection of the left hip was performed without complication, patient tolerated procedure.  The patient was instructed to obtain follow up care from the referring physician.     Exam directly supervised by Dr. Cole WEINER       Electronically Signed and Approved By: Slava Galvan M.D. on 9/22/2023 at 16:42            []  EKG/Telemetry   []  Cardiology/Vascular   []  Pathology  []  Old records  []  Other:    Assessment & Plan   Assessment / Plan     Assessment:  Generalized weakness  Diabetes  Low sodium likely a lab error given drastic change this morning  Hypertension  History of atrial fibrillation  Morbid obesity  Debility with wheelchair dependent  Chronic wound  Plan:  Patient placed back in the hospital  Continue PT/OT  Continue current medications  Continue as needed Lomotil  Continue wound care as needed  Status post IR guided hip injection and knee injection by orthopedic surgery  Vitals reviewed  Continue increased dose of Lyrica for more neuropathic pain control  Orthopedic surgery consulted, patient status post knee injection  Wants double portions for breakfast  CBC, CMP reviewed  Continue increased dose of Levemir for  better glucose control, tolerating  Awaiting rehab arrangements.    Reviewed patients labs and imaging, and discussed with patient and nurse at bedside.    DVT prophylaxis:  Medical DVT prophylaxis orders are present.    CODE STATUS:       Electronically signed by Max Mehta MD, 23, 1:00 PM EDT.                                                                    Electronically signed by Max Mehta MD at 23 1300          Physical Therapy Notes (most recent note)        Oscar Shields, PT Student at 23 1513  Version 1 of 1      Attestation signed by Merlin Rao, PT at 23 1515                     Acute Care - Physical Therapy Treatment Note  [facility]     Patient Name: Jose Shaikh  : 1960  MRN: [id number]  Today's Date: 2023      Visit Dx:     ICD-10-CM ICD-9-CM   1. Generalized weakness  R53.1 780.79   2. Hyponatremia  E87.1 276.1   3. Difficulty in walking  R26.2 719.7   4. Decreased activities of daily living (ADL)  Z78.9 V49.89   5. Difficulty walking  R26.2 719.7     Patient Active Problem List   Diagnosis    Diabetic ulcer of left heel associated with type 2 DM    Acute osteomyelitis of left calcaneus     Diabetic ulcer of left heel associated with type 2 DM    Diabetic ulcer of right midfoot associated with type 2 DM    Paroxysmal atrial fibrillation    Essential hypertension    Hyperlipidemia LDL goal <100    Cellulitis and abscess of foot    High alkaline phosphatase level    Osteomyelitis    Onychomycosis    Onychocryptosis    Foot pain, bilateral    Osteomyelitis of foot, right, acute    Cellulitis of right foot    Type 2 diabetes mellitus, with long-term current use of insulin    Class 3 severe obesity due to excess calories with serious comorbidity and body mass index (BMI) of 45.0 to 49.9 in adult    Anxiety disorder, unspecified    Claustrophobia    Dependence on wheelchair    Depression, unspecified    Long term (current) use of  anticoagulants    Long term (current) use of oral hypoglycemic drugs    Wound of foot    Non-prs chronic ulcer oth prt r foot limited to brkdwn skin    Orthostatic hypotension    Other chronic osteomyelitis, right ankle and foot    Personal history of nicotine dependence    Thrombocytopenia, unspecified    Unspecified open wound, right foot, initial encounter    Diabetic foot infection    Subacute osteomyelitis of right foot    Right foot pain    Sepsis    Onychomycosis    Foot pain, left    Impaired mobility and ADLs    Absence of toe of right foot    Corns and callosity    Disability of walking    Fracture    Limb swelling    Polyneuropathy    Pressure ulcer, stage 1    Shortness of breath    Generalized weakness     Past Medical History:   Diagnosis Date    Absence of toe of right foot     Acute osteomyelitis of left calcaneus  8/18/2021    Anxiety and depression     Arthritis     Claustrophobia     Corns and callus     Diabetic ulcer of left heel associated with type 2 DM 8/18/2021    Diabetic ulcer of left heel associated with type 2 DM 7/6/2021    Diabetic ulcer of right midfoot associated with type 2 DM 8/18/2021    Difficulty walking     Essential hypertension 8/31/2021    Hammertoe     Hyperlipidemia LDL goal <100 8/31/2021    Ingrown toenail     Obesity     Paroxysmal atrial fibrillation 8/31/2021    Polyneuropathy     Pressure ulcer, stage 1     Tinea unguium     Type 2 diabetes mellitus with polyneuropathy      Past Surgical History:   Procedure Laterality Date    CYST REMOVAL      center of back; benign    INCISION AND DRAINAGE ABSCESS      back    INCISION AND DRAINAGE LEG Right 12/10/2021    Procedure: INCISION AND DRAINAGE LOWER EXTREMITY;  Surgeon: Ash Leyva DPM;  Location: [facility];  Service: Podiatry;  Laterality: Right;    OTHER SURGICAL HISTORY      Surgical clips left foot    TOE SURGERY Right     Removal of 5th toe    TRANS METATARSAL AMPUTATION Right 12/2/2021     Procedure: AMPUTATION TRANS METATARSAL;  Surgeon: Ash Leyva DPM;  Location: [facility] MAIN OR;  Service: Podiatry;  Laterality: Right;    WRIST SURGERY Left     repair of injury     PT Assessment (last 12 hours)       PT Evaluation and Treatment       Row Name 09/27/23 1500          Physical Therapy Time and Intention    Subjective Information complains of;weakness;fatigue;pain (P)   -ZT     Document Type therapy note (daily note) (P)   -ZT     Mode of Treatment individual therapy;physical therapy (P)   -ZT     Patient Effort good (P)   -ZT       Row Name 09/27/23 1500          General Information    Patient Profile Reviewed yes (P)   -ZT     Patient Observations alert;cooperative;agree to therapy (P)   -ZT     Prior Level of Function independent:;ADL's (P)   -ZT     Existing Precautions/Restrictions fall (P)   -ZT       Row Name 09/27/23 1500          Bed Mobility    Bed Mobility bed mobility (all) activities (P)   -ZT     All Activities, [place] (Bed Mobility) moderate assist (50% patient effort) (P)   -ZT     Assistive Device (Bed Mobility) bed rails;overhead trapeze (P)   -ZT       Row Name 09/27/23 1500          Transfers    Transfers sit-stand transfer;stand-sit transfer (P)   -ZT       Row Name 09/27/23 1500          Sit-Stand Transfer    Sit-Stand [place] (Transfers) maximum assist (25% patient effort) (P)   -ZT     Assistive Device (Sit-Stand Transfers) walker, front-wheeled (P)   -ZT       Row Name 09/27/23 1500          Stand-Sit Transfer    Stand-Sit [place] (Transfers) maximum assist (25% patient effort) (P)   -ZT     Assistive Device (Stand-Sit Transfers) walker, front-wheeled (P)   -ZT       Row Name 09/27/23 1500          Gait/Stairs (Locomotion)    Gait/Stairs Locomotion gait/ambulation assistive device (P)   -ZT     [place] Level (Gait) maximum assist (25% patient effort) (P)   -ZT     Assistive Device (Gait) walker, front-wheeled (P)   -ZT     Distance in Feet (Gait)  -- (P)   2 steps forward, 2 steps back  -ZT       Row Name 09/27/23 1500          Safety Issues, Functional Mobility    Impairments Affecting Function (Mobility) balance;endurance/activity tolerance;pain;strength (P)   -ZT       Row Name 09/27/23 1500          Balance    Balance Assessment standing dynamic balance (P)   -ZT     Dynamic Standing Balance maximum assist (P)   -ZT     Position/Device Used, Standing Balance walker, front-wheeled (P)   -ZT       Row Name 09/27/23 1500          Motor Skills    Therapeutic Exercise hip;knee (P)   -ZT       Row Name 09/27/23 1500          Hip (Therapeutic Exercise)    Hip Strengthening (Therapeutic Exercise) marching while seated;other (see comments) (P)   Pt performed 40 reps of marches bilaterally seated on EOB  -ZT       Row Name 09/27/23 1500          Knee (Therapeutic Exercise)    Knee Strengthening (Therapeutic Exercise) SLR (straight leg raise);other (see comments) (P)   Pt performed 40 reps of SLR seated on EOB  -ZT       Row Name             Wound 09/10/23 2345 Left proximal arm Skin Tear    Wound - Properties Group Placement Date: 09/10/23  -BL Placement Time: 2345 -BL Present on Hospital Admission: Y  -BL Side: Left  -BL Orientation: proximal  -BL Location: arm  -BL Primary Wound Type: Skin tear  -BL, old healing skin tear     Retired Wound - Properties Group Placement Date: 09/10/23  -BL Placement Time: 2345 -BL Present on Hospital Admission: Y  -BL Side: Left  -BL Orientation: proximal  -BL Location: arm  -BL Primary Wound Type: Skin tear  -BL, old healing skin tear     Retired Wound - Properties Group Date first assessed: 09/10/23  -BL Time first assessed: 2345 -BL Present on Hospital Admission: Y  -BL Side: Left  -BL Location: arm  -BL Primary Wound Type: Skin tear  -BL, old healing skin tear       Row Name             Wound 09/10/23 2345 Left posterior foot Diabetic Ulcer    Wound - Properties Group Placement Date: 09/10/23  -BL Placement Time: 2345 -BL  Side: Left  -BL Orientation: posterior  -BL Location: foot  -BL Primary Wound Type: Diabetic ulc  -BL    Retired Wound - Properties Group Placement Date: 09/10/23  -BL Placement Time: 2345  -BL Side: Left  -BL Orientation: posterior  -BL Location: foot  -BL Primary Wound Type: Diabetic ulc  -BL    Retired Wound - Properties Group Date first assessed: 09/10/23  -BL Time first assessed: 2345  -BL Side: Left  -BL Location: foot  -BL Primary Wound Type: Diabetic ulc  -BL      Row Name             Wound 09/10/23 2345 Left distal calf Diabetic Ulcer    Wound - Properties Group Placement Date: 09/10/23  -BL Placement Time: 2345  -BL Side: Left  -BL Orientation: distal  -BL Location: calf  -BL Primary Wound Type: Diabetic ulc  -BL    Retired Wound - Properties Group Placement Date: 09/10/23  -BL Placement Time: 2345  -BL Side: Left  -BL Orientation: distal  -BL Location: calf  -BL Primary Wound Type: Diabetic ulc  -BL    Retired Wound - Properties Group Date first assessed: 09/10/23  -BL Time first assessed: 2345  -BL Side: Left  -BL Location: calf  -BL Primary Wound Type: Diabetic ulc  -BL      Row Name             Wound 09/10/23 2345 Right distal calf MASD (Moisture associated skin damage)    Wound - Properties Group Placement Date: 09/10/23  -BL Placement Time: 2345  -BL Side: Right  -BL Orientation: distal  -BL Location: calf  -BL Primary Wound Type: MASD  -BL    Retired Wound - Properties Group Placement Date: 09/10/23  -BL Placement Time: 2345  -BL Side: Right  -BL Orientation: distal  -BL Location: calf  -BL Primary Wound Type: MASD  -BL    Retired Wound - Properties Group Date first assessed: 09/10/23  -BL Time first assessed: 2345  -BL Side: Right  -BL Location: calf  -BL Primary Wound Type: MASD  -BL      Row Name             Wound 09/10/23 2345 Right anterior hip MASD (Moisture associated skin damage)    Wound - Properties Group Placement Date: 09/10/23  -BL Placement Time: 2345  -BL Present on Hospital  Admission: Y  -BL Side: Right  -BL Orientation: anterior  -BL Location: hip  -BL Primary Wound Type: MASD  -BL    Retired Wound - Properties Group Placement Date: 09/10/23  -BL Placement Time: 2345  -BL Present on Hospital Admission: Y  -BL Side: Right  -BL Orientation: anterior  -BL Location: hip  -BL Primary Wound Type: MASD  -BL    Retired Wound - Properties Group Date first assessed: 09/10/23  -BL Time first assessed: 2345  -BL Present on Hospital Admission: Y  -BL Side: Right  -BL Location: hip  -BL Primary Wound Type: MASD  -BL      Row Name             Wound 06/22/21 1133 Left lateral heel    Wound - Properties Group Placement Date: 06/22/21  -FABIOLA Placement Time: 1133  -FABIOLA Present on Hospital Admission: Y  -FABIOLA Side: Left  -FABIOLA Orientation: lateral  -FABIOLA Location: heel  -FABIOLA    Retired Wound - Properties Group Placement Date: 06/22/21  -FABIOLA Placement Time: 1133  -FABIOLA Present on Hospital Admission: Y  -FABIOLA Side: Left  -FABIOLA Orientation: lateral  -FABIOLA Location: heel  -FABIOLA    Retired Wound - Properties Group Date first assessed: 06/22/21  -FABIOLA Time first assessed: 1133  -FABIOLA Present on Hospital Admission: Y  -FABIOLA Side: Left  -FABIOLA Location: heel  -FABIOLA      Row Name             Wound 12/02/21 Right anterior foot Incision    Wound - Properties Group Placement Date: 12/02/21  -ES Side: Right  -ES Orientation: anterior  -ES Location: foot  -ES Primary Wound Type: Incision  -ES    Retired Wound - Properties Group Placement Date: 12/02/21  -ES Side: Right  -ES Orientation: anterior  -ES Location: foot  -ES Primary Wound Type: Incision  -ES    Retired Wound - Properties Group Date first assessed: 12/02/21  -ES Side: Right  -ES Location: foot  -ES Primary Wound Type: Incision  -ES      Row Name             Wound 09/15/23 Right gluteal    Wound - Properties Group Placement Date: 09/15/23  -NH Side: Right  -NH Location: gluteal  -NH    Retired Wound - Properties Group Placement Date: 09/15/23  -NH Side: Right  -NH Location: gluteal  -NH     Retired Wound - Properties Group Date first assessed: 09/15/23  -NH Side: Right  -NH Location: gluteal  -NH      Row Name 09/27/23 1500          Plan of Care Review    Plan of Care Reviewed With patient (P)   -ZT       Row Name 09/27/23 1500          Therapy Assessment/Plan (PT)    Rehab Potential (PT) good, to achieve stated therapy goals (P)   -ZT     Criteria for Skilled Interventions Met (PT) yes;skilled treatment is necessary (P)   -ZT     Therapy Frequency (PT) daily (P)   -ZT     Problem List (PT) problems related to;balance;coordination;strength;pain (P)   -ZT     Activity Limitations Related to Problem List (PT) unable to ambulate safely;unable to transfer safely;BADLs not performed adequately or safely;IADLs not performed adequately or safely;community activities not performed adequately or safely (P)   -ZT       Row Name 09/27/23 1500          Progress Summary (PT)    Progress Toward Functional Goals (PT) progress toward functional goals is good (P)   -ZT     Daily Progress Summary (PT) Pt presents today with decreased endurance and decreased functional activity tolerance. He is unable to tolerate more than a few steps of AMB before his legs become too weak and his knee pain exacerbates. He requires mod to max assistance to move in and out of bed. He still requires skilled PT services to address these deficits. (P)   -ZT       Row Name 09/27/23 1500          Therapy Plan Review/Discharge Plan (PT)    Therapy Plan Review (PT) evaluation/treatment results reviewed;care plan/treatment goals reviewed;participants included;patient (P)   -ZT               User Key  (r) = Recorded By, (t) = Taken By, (c) = Cosigned By      Initials Name Provider Type    Sarah Bernstein, RN Registered Nurse    NH Dottie Guevara, RN Registered Nurse    Marlen Vaughn, RN Registered Nurse    Katlyn Garcia RN Registered Nurse    ZT Oscar Shields, PT Student PT Student                    Physical Therapy Education        Title: PT OT SLP Therapies (Done)       Topic: Physical Therapy (Done)       Point: Mobility training (Done)       Learning Progress Summary             Patient Acceptance, E, VU,NR by RASHARD at 9/24/2023 0604    Acceptance, E, VU,DU by RF at 9/18/2023 1537    Acceptance, E, VU,DU by RF at 9/17/2023 1515    Acceptance, E, VU,DU by RF at 9/16/2023 1804    Acceptance, E, VU by AV at 9/13/2023 1059    Acceptance, E,TB, VU by  at 9/12/2023 1308                         Point: Home exercise program (Done)       Learning Progress Summary             Patient Acceptance, E, VU,NR by RASHARD at 9/24/2023 0604    Acceptance, E, VU,DU by RF at 9/18/2023 1537    Acceptance, E, VU,DU by RF at 9/17/2023 1515    Acceptance, E, VU,DU by RF at 9/16/2023 1804    Acceptance, E, VU by AV at 9/13/2023 1059    Acceptance, E,TB, VU by  at 9/12/2023 1308                         Point: Body mechanics (Done)       Learning Progress Summary             Patient Acceptance, E, VU,NR by RASHARD at 9/24/2023 0604    Acceptance, E, VU,DU by RF at 9/18/2023 1537    Acceptance, E, VU,DU by RF at 9/17/2023 1515    Acceptance, E, VU,DU by RF at 9/16/2023 1804    Acceptance, E, VU by AV at 9/13/2023 1059    Acceptance, E,TB, VU by  at 9/12/2023 1308                         Point: Precautions (Done)       Learning Progress Summary             Patient Acceptance, E, VU,NR by RASHARD at 9/24/2023 0604    Acceptance, E, VU,DU by RF at 9/18/2023 1537    Acceptance, E, VU,DU by RF at 9/17/2023 1515    Acceptance, E, VU,DU by RF at 9/16/2023 1804    Acceptance, E, VU by AV at 9/13/2023 1059    Acceptance, E,TB, VU by  at 9/12/2023 1308                                         User Key       Initials Effective Dates Name Provider Type Discipline    RASHARD 06/16/21 -  Selena Lindquist, RN Registered Nurse Nurse    AV 06/16/21 -  Uvaldo Christine OT Occupational Therapist OT    RF 01/19/22 -  Karl Baker, RN Registered Nurse Nurse     08/16/23 -  Gamal Simmons, MERLIN  Student PT Student PT                  PT Recommendation and Plan  Anticipated Discharge Disposition (PT): (P) inpatient rehabilitation facility  Planned Therapy Interventions (PT): (P) balance training, bed mobility training, gait training, stair training, strengthening, transfer training  Therapy Frequency (PT): (P) daily  Progress Summary (PT)  Progress Toward Functional Goals (PT): (P) progress toward functional goals is good  Daily Progress Summary (PT): (P) Pt presents today with decreased endurance and decreased functional activity tolerance. He is unable to tolerate more than a few steps of AMB before his legs become too weak and his knee pain exacerbates. He requires mod to max assistance to move in and out of bed. He still requires skilled PT services to address these deficits.  Plan of Care Reviewed With: (P) patient   Outcome Measures       Row Name 09/27/23 1500 09/25/23 1500          How much help from another person do you currently need...    Turning from your back to your side while in flat bed without using bedrails? 2 (P)   -ZT 2  -MOE (r) ZT (t) MOE (c)     Moving from lying on back to sitting on the side of a flat bed without bedrails? 2 (P)   -ZT 2  -MOE (r) ZT (t) MOE (c)     Moving to and from a bed to a chair (including a wheelchair)? 1 (P)   -ZT 1  -MOE (r) ZT (t) MOE (c)     Standing up from a chair using your arms (e.g., wheelchair, bedside chair)? 1 (P)   -ZT 1  -MOE (r) ZT (t) MOE (c)     Climbing 3-5 steps with a railing? 1 (P)   -ZT 1  -MOE (r) ZT (t) MOE (c)     To walk in hospital room? 1 (P)   -ZT 1  -MOE (r) ZT (t) MOE (c)     AM-PAC 6 Clicks Score (PT) 8 (P)   -ZT 8  -MOE (r) ZT (t)               User Key  (r) = Recorded By, (t) = Taken By, (c) = Cosigned By      Initials Name Provider Type    MOE Merlin Rao, PT Physical Therapist    ZT Oscar Shields, PT Student PT Student                     Time Calculation:    PT Charges       Row Name 09/27/23 1507             Time Calculation     PT Received On 23 (P)   -ZT      PT Goal Re-Cert Due Date 10/06/23 (P)   -ZT         Timed Charges    91615 - PT Therapeutic Exercise Minutes 15 (P)   -ZT      85397 - Gait Training Minutes  10 (P)   -ZT         Total Minutes    Timed Charges Total Minutes 25 (P)   -ZT       Total Minutes 25 (P)   -ZT                User Key  (r) = Recorded By, (t) = Taken By, (c) = Cosigned By      Initials Name Provider Type    ZT Oscar Shields, PT Student PT Student                  Therapy Charges for Today       Code Description Service Date Service Provider Modifiers Qty    70130917402 HC PT THER PROC EA 15 MIN 2023 Oscar Shields, PT Student GP 1    79240031835 HC GAIT TRAINING EA 15 MIN 2023 Oscar Shields, PT Student GP 1            PT G-Codes  Outcome Measure Options: AM-PAC 6 Clicks Basic Mobility (PT)  AM-PAC 6 Clicks Score (PT): (P) 8  AM-PAC 6 Clicks Score (OT): 17    Oscar Shields PT Student  2023      Electronically signed by Mrelin Rao, PT at 23 1515          Occupational Therapy Notes (most recent note)        Uvaldo Christine, OT at 23 0904          Patient Name: Jose Shaikh  : 1960    MRN: [id number]                              Today's Date: 2023       Admit Date: 9/10/2023    Visit Dx:     ICD-10-CM ICD-9-CM   1. Generalized weakness  R53.1 780.79   2. Hyponatremia  E87.1 276.1   3. Difficulty in walking  R26.2 719.7   4. Decreased activities of daily living (ADL)  Z78.9 V49.89     Patient Active Problem List   Diagnosis    Diabetic ulcer of left heel associated with type 2 DM    Acute osteomyelitis of left calcaneus     Diabetic ulcer of left heel associated with type 2 DM    Diabetic ulcer of right midfoot associated with type 2 DM    Paroxysmal atrial fibrillation    Essential hypertension    Hyperlipidemia LDL goal <100    Cellulitis and abscess of foot    High alkaline phosphatase level    Osteomyelitis    Onychomycosis    Onychocryptosis    Foot  pain, bilateral    Osteomyelitis of foot, right, acute    Cellulitis of right foot    Type 2 diabetes mellitus, with long-term current use of insulin    Class 3 severe obesity due to excess calories with serious comorbidity and body mass index (BMI) of 45.0 to 49.9 in adult    Anxiety disorder, unspecified    Claustrophobia    Dependence on wheelchair    Depression, unspecified    Long term (current) use of anticoagulants    Long term (current) use of oral hypoglycemic drugs    Wound of foot    Non-prs chronic ulcer oth prt r foot limited to brkdwn skin    Orthostatic hypotension    Other chronic osteomyelitis, right ankle and foot    Personal history of nicotine dependence    Thrombocytopenia, unspecified    Unspecified open wound, right foot, initial encounter    Diabetic foot infection    Subacute osteomyelitis of right foot    Right foot pain    Sepsis    Onychomycosis    Foot pain, left    Impaired mobility and ADLs    Absence of toe of right foot    Corns and callosity    Disability of walking    Fracture    Limb swelling    Polyneuropathy    Pressure ulcer, stage 1    Shortness of breath    Generalized weakness     Past Medical History:   Diagnosis Date    Absence of toe of right foot     Acute osteomyelitis of left calcaneus  8/18/2021    Anxiety and depression     Arthritis     Claustrophobia     Corns and callus     Diabetic ulcer of left heel associated with type 2 DM 8/18/2021    Diabetic ulcer of left heel associated with type 2 DM 7/6/2021    Diabetic ulcer of right midfoot associated with type 2 DM 8/18/2021    Difficulty walking     Essential hypertension 8/31/2021    Hammertoe     Hyperlipidemia LDL goal <100 8/31/2021    Ingrown toenail     Obesity     Paroxysmal atrial fibrillation 8/31/2021    Polyneuropathy     Pressure ulcer, stage 1     Tinea unguium     Type 2 diabetes mellitus with polyneuropathy      Past Surgical History:   Procedure Laterality Date    CYST REMOVAL      center of back;  benign    INCISION AND DRAINAGE ABSCESS      back    INCISION AND DRAINAGE LEG Right 12/10/2021    Procedure: INCISION AND DRAINAGE LOWER EXTREMITY;  Surgeon: Ash Leyva DPM;  Location: [facility] MAIN OR;  Service: Podiatry;  Laterality: Right;    OTHER SURGICAL HISTORY      Surgical clips left foot    TOE SURGERY Right     Removal of 5th toe    TRANS METATARSAL AMPUTATION Right 12/2/2021    Procedure: AMPUTATION TRANS METATARSAL;  Surgeon: Ash Leyva DPM;  Location: [facility] MAIN OR;  Service: Podiatry;  Laterality: Right;    WRIST SURGERY Left     repair of injury      General Information       Row Name 09/21/23 0901          OT Time and Intention    Document Type therapy note (daily note)  -AV     Mode of Treatment individual therapy;occupational therapy  -AV       Row Name 09/21/23 0901          General Information    Existing Precautions/Restrictions fall  WBAT (order 9/13/2023)  -AV       Row Name 09/21/23 0901          Cognition    Orientation Status (Cognition) --  Alert.  Able to perform therapeutic exercises with minimal cues and demonstration.  -AV       Row Name 09/21/23 0901          Safety Issues, Functional Mobility    Impairments Affecting Function (Mobility) balance;endurance/activity tolerance;cognition  -AV               User Key  (r) = Recorded By, (t) = Taken By, (c) = Cosigned By      Initials Name Provider Type    AV Uvaldo Christine OT Occupational Therapist                     Mobility/ADL's    No documentation.                  Obj/Interventions       Row Name 09/21/23 0902          Shoulder (Therapeutic Exercise)    Shoulder (Therapeutic Exercise) strengthening exercise  -AV     Shoulder Strengthening (Therapeutic Exercise) bilateral;flexion;horizontal aBduction/aDduction;1 lb free weight;20 repititions  -AV       Row Name 09/21/23 0902          Elbow/Forearm (Therapeutic Exercise)    Elbow/Forearm (Therapeutic Exercise) strengthening exercise  -AV     Elbow/Forearm  Strengthening (Therapeutic Exercise) bilateral;flexion;extension;supination;pronation;1 lb free weight;20 repititions  -AV       Row Name 09/21/23 0902          Motor Skills    Therapeutic Exercise shoulder;elbow/forearm  Performed in high Fowlers/on room air  -AV               User Key  (r) = Recorded By, (t) = Taken By, (c) = Cosigned By      Initials Name Provider Type    Uvaldo Hardy OT Occupational Therapist                   Goals/Plan    No documentation.                  Clinical Impression       Row Name 09/21/23 0903          Pain Scale: FACES Pre/Post-Treatment    Pain: FACES Scale, Pretreatment 2-->hurts little bit  -AV     Posttreatment Pain Rating 2-->hurts little bit  -AV     Pain Location generalized  -AV       Row Name 09/21/23 0903          Plan of Care Review    Outcome Evaluation Patient performed upper extremity therapeutic exercises to improve endurance/activity tolerance needed to support ADLs.  Continued OT is indicated to remediate/compensate for deficits to maximize independence.  -AV       Row Name 09/21/23 0903          Vital Signs    O2 Delivery Pre Treatment room air  -AV     O2 Delivery Intra Treatment room air  -AV     O2 Delivery Post Treatment room air  -AV               User Key  (r) = Recorded By, (t) = Taken By, (c) = Cosigned By      Initials Name Provider Type    Uvaldo Hardy OT Occupational Therapist                   Outcome Measures       Row Name 09/21/23 0903          How much help from another is currently needed...    Putting on and taking off regular lower body clothing? 2  -AV     Bathing (including washing, rinsing, and drying) 2  -AV     Toileting (which includes using toilet bed pan or urinal) 2  -AV     Putting on and taking off regular upper body clothing 3  -AV     Taking care of personal grooming (such as brushing teeth) 4  -AV     Eating meals 4  -AV     AM-PAC 6 Clicks Score (OT) 17  -AV       Row Name 09/21/23 0749 09/20/23 4921       How much  help from another person do you currently need...    Turning from your back to your side while in flat bed without using bedrails? 3  -MP 3  -HA    Moving from lying on back to sitting on the side of a flat bed without bedrails? 3  -MP 2  -HA    Moving to and from a bed to a chair (including a wheelchair)? 1  -MP 1  -HA    Standing up from a chair using your arms (e.g., wheelchair, bedside chair)? 1  -MP 1  -HA    Climbing 3-5 steps with a railing? 1  -MP 1  -HA    To walk in hospital room? 1  -MP 1  -HA    AM-PAC 6 Clicks Score (PT) 10  -MP 9  -HA    Highest level of mobility 4 --> Transferred to chair/commode  -MP 3 --> Sat at edge of bed  -HA      Row Name 09/21/23 0903          Optimal Instrument    Bending/Stooping 5  -AV     Standing 4  -AV     Reaching 1  -AV               User Key  (r) = Recorded By, (t) = Taken By, (c) = Cosigned By      Initials Name Provider Type    Uvaldo Hardy, BEKAH Occupational Therapist    Kay Velarde, RN Registered Nurse    Gina Bower RNA Registered Nurse                    Occupational Therapy Education       Title: PT OT SLP Therapies (Done)       Topic: Occupational Therapy (Done)       Point: ADL training (Done)       Description:   Instruct learner(s) on proper safety adaptation and remediation techniques during self care or transfers.   Instruct in proper use of assistive devices.                  Learning Progress Summary             Patient Acceptance, E, VU,DU by RF at 9/18/2023 1537    Acceptance, E, VU,DU by RF at 9/17/2023 1515    Acceptance, E, VU,DU by RF at 9/16/2023 1804    Acceptance, E, VU by AV at 9/13/2023 1059                         Point: Home exercise program (Done)       Description:   Instruct learner(s) on appropriate technique for monitoring, assisting and/or progressing therapeutic exercises/activities.                  Learning Progress Summary             Patient Acceptance, E, VU,DU by RF at 9/18/2023 1537    Acceptance, E, VU,DU by  RF at 9/17/2023 1515    Acceptance, E, VU,DU by RF at 9/16/2023 1804    Acceptance, E, VU by AV at 9/13/2023 1059                         Point: Precautions (Done)       Description:   Instruct learner(s) on prescribed precautions during self-care and functional transfers.                  Learning Progress Summary             Patient Acceptance, E, VU,DU by RF at 9/18/2023 1537    Acceptance, E, VU,DU by RF at 9/17/2023 1515    Acceptance, E, VU,DU by RF at 9/16/2023 1804    Acceptance, E, VU by AV at 9/13/2023 1059                         Point: Body mechanics (Done)       Description:   Instruct learner(s) on proper positioning and spine alignment during self-care, functional mobility activities and/or exercises.                  Learning Progress Summary             Patient Acceptance, E, VU,DU by RF at 9/18/2023 1537    Acceptance, E, VU,DU by RF at 9/17/2023 1515    Acceptance, E, VU,DU by RF at 9/16/2023 1804    Acceptance, E, VU by AV at 9/13/2023 1059                                         User Key       Initials Effective Dates Name Provider Type Discipline     06/16/21 -  Uvaldo Christine OT Occupational Therapist OT     01/19/22 -  Karl Baker, RN Registered Nurse Nurse                  OT Recommendation and Plan  Planned Therapy Interventions (OT): activity tolerance training, BADL retraining, functional balance retraining, occupation/activity based interventions, patient/caregiver education/training, transfer/mobility retraining  Therapy Frequency (OT): 5 times/wk  Plan of Care Review  Plan of Care Reviewed With: patient  Progress: no change (first session: evaluation)  Outcome Evaluation: Patient performed upper extremity therapeutic exercises to improve endurance/activity tolerance needed to support ADLs.  Continued OT is indicated to remediate/compensate for deficits to maximize independence.     Time Calculation:   Evaluation Complexity (OT)  Review Occupational Profile/Medical/Therapy  History Complexity: expanded/moderate complexity  Assessment, Occupational Performance/Identification of Deficit Complexity: 3-5 performance deficits  Clinical Decision Making Complexity (OT): problem focused assessment/low complexity  Overall Complexity of Evaluation (OT): low complexity     Time Calculation- OT       Row Name 09/21/23 0904             Time Calculation- OT    OT Received On 09/21/23  -AV      OT Goal Re-Cert Due Date 09/22/23  -AV         Timed Charges    14319 - OT Therapeutic Exercise Minutes 10  -AV         Total Minutes    Timed Charges Total Minutes 10  -AV       Total Minutes 10  -AV                User Key  (r) = Recorded By, (t) = Taken By, (c) = Cosigned By      Initials Name Provider Type    AV Uvaldo Christine OT Occupational Therapist                  Therapy Charges for Today       Code Description Service Date Service Provider Modifiers Qty    75665261690 HC OT THER PROC EA 15 MIN 9/21/2023 Uvaldo Christine OT GO 1                 Uvaldo Christine OT  9/21/2023    Electronically signed by Uvaldo Christine OT at 09/21/23 0905

## 2023-09-27 NOTE — PLAN OF CARE
Goal Outcome Evaluation:  Plan of Care Reviewed With: patient        Progress: no change  Outcome Evaluation: alert and oriented x 4. blood glucose monitored. skin care performed per orders.

## 2023-09-28 LAB
ALBUMIN SERPL-MCNC: 3.3 G/DL (ref 3.5–5.2)
ALBUMIN/GLOB SERPL: 1 G/DL
ALP SERPL-CCNC: 196 U/L (ref 39–117)
ALT SERPL W P-5'-P-CCNC: 54 U/L (ref 1–41)
ANION GAP SERPL CALCULATED.3IONS-SCNC: 7.7 MMOL/L (ref 5–15)
AST SERPL-CCNC: 59 U/L (ref 1–40)
BASOPHILS # BLD AUTO: 0.03 10*3/MM3 (ref 0–0.2)
BASOPHILS NFR BLD AUTO: 0.5 % (ref 0–1.5)
BILIRUB SERPL-MCNC: 1.1 MG/DL (ref 0–1.2)
BUN SERPL-MCNC: 11 MG/DL (ref 8–23)
BUN/CREAT SERPL: 21.6 (ref 7–25)
CALCIUM SPEC-SCNC: 9 MG/DL (ref 8.6–10.5)
CHLORIDE SERPL-SCNC: 103 MMOL/L (ref 98–107)
CO2 SERPL-SCNC: 28.3 MMOL/L (ref 22–29)
CREAT SERPL-MCNC: 0.51 MG/DL (ref 0.76–1.27)
DEPRECATED RDW RBC AUTO: 52.7 FL (ref 37–54)
EGFRCR SERPLBLD CKD-EPI 2021: 113.2 ML/MIN/1.73
EOSINOPHIL # BLD AUTO: 0.1 10*3/MM3 (ref 0–0.4)
EOSINOPHIL NFR BLD AUTO: 1.7 % (ref 0.3–6.2)
ERYTHROCYTE [DISTWIDTH] IN BLOOD BY AUTOMATED COUNT: 17.7 % (ref 12.3–15.4)
GLOBULIN UR ELPH-MCNC: 3.2 GM/DL
GLUCOSE BLDC GLUCOMTR-MCNC: 153 MG/DL (ref 70–99)
GLUCOSE BLDC GLUCOMTR-MCNC: 203 MG/DL (ref 70–99)
GLUCOSE BLDC GLUCOMTR-MCNC: 206 MG/DL (ref 70–99)
GLUCOSE BLDC GLUCOMTR-MCNC: 243 MG/DL (ref 70–99)
GLUCOSE SERPL-MCNC: 133 MG/DL (ref 65–99)
HCT VFR BLD AUTO: 39.1 % (ref 37.5–51)
HGB BLD-MCNC: 12 G/DL (ref 13–17.7)
IMM GRANULOCYTES # BLD AUTO: 0.01 10*3/MM3 (ref 0–0.05)
IMM GRANULOCYTES NFR BLD AUTO: 0.2 % (ref 0–0.5)
LYMPHOCYTES # BLD AUTO: 1.39 10*3/MM3 (ref 0.7–3.1)
LYMPHOCYTES NFR BLD AUTO: 24.1 % (ref 19.6–45.3)
MAGNESIUM SERPL-MCNC: 1.9 MG/DL (ref 1.6–2.4)
MCH RBC QN AUTO: 25.1 PG (ref 26.6–33)
MCHC RBC AUTO-ENTMCNC: 30.7 G/DL (ref 31.5–35.7)
MCV RBC AUTO: 81.6 FL (ref 79–97)
MONOCYTES # BLD AUTO: 0.63 10*3/MM3 (ref 0.1–0.9)
MONOCYTES NFR BLD AUTO: 10.9 % (ref 5–12)
NEUTROPHILS NFR BLD AUTO: 3.61 10*3/MM3 (ref 1.7–7)
NEUTROPHILS NFR BLD AUTO: 62.6 % (ref 42.7–76)
NRBC BLD AUTO-RTO: 0 /100 WBC (ref 0–0.2)
PHOSPHATE SERPL-MCNC: 3.8 MG/DL (ref 2.5–4.5)
PLATELET # BLD AUTO: 97 10*3/MM3 (ref 140–450)
PMV BLD AUTO: 11.1 FL (ref 6–12)
POTASSIUM SERPL-SCNC: 4 MMOL/L (ref 3.5–5.2)
PROT SERPL-MCNC: 6.5 G/DL (ref 6–8.5)
RBC # BLD AUTO: 4.79 10*6/MM3 (ref 4.14–5.8)
SODIUM SERPL-SCNC: 139 MMOL/L (ref 136–145)
WBC NRBC COR # BLD: 5.77 10*3/MM3 (ref 3.4–10.8)

## 2023-09-28 PROCEDURE — 99232 SBSQ HOSP IP/OBS MODERATE 35: CPT | Performed by: INTERNAL MEDICINE

## 2023-09-28 PROCEDURE — 80053 COMPREHEN METABOLIC PANEL: CPT | Performed by: INTERNAL MEDICINE

## 2023-09-28 PROCEDURE — 82948 REAGENT STRIP/BLOOD GLUCOSE: CPT

## 2023-09-28 PROCEDURE — 63710000001 INSULIN DETEMIR PER 5 UNITS: Performed by: INTERNAL MEDICINE

## 2023-09-28 PROCEDURE — 84100 ASSAY OF PHOSPHORUS: CPT | Performed by: INTERNAL MEDICINE

## 2023-09-28 PROCEDURE — 85025 COMPLETE CBC W/AUTO DIFF WBC: CPT | Performed by: INTERNAL MEDICINE

## 2023-09-28 PROCEDURE — 83735 ASSAY OF MAGNESIUM: CPT | Performed by: INTERNAL MEDICINE

## 2023-09-28 PROCEDURE — 63710000001 INSULIN LISPRO (HUMAN) PER 5 UNITS: Performed by: FAMILY MEDICINE

## 2023-09-28 RX ORDER — LISINOPRIL 2.5 MG/1
2.5 TABLET ORAL
Status: DISCONTINUED | OUTPATIENT
Start: 2023-09-28 | End: 2023-11-06 | Stop reason: HOSPADM

## 2023-09-28 RX ADMIN — BACLOFEN 10 MG: 10 TABLET ORAL at 13:07

## 2023-09-28 RX ADMIN — HYDROCODONE BITARTRATE AND ACETAMINOPHEN 1 TABLET: 7.5; 325 TABLET ORAL at 08:28

## 2023-09-28 RX ADMIN — EMPAGLIFLOZIN 10 MG: 10 TABLET, FILM COATED ORAL at 13:00

## 2023-09-28 RX ADMIN — INSULIN LISPRO 4 UNITS: 100 INJECTION, SOLUTION INTRAVENOUS; SUBCUTANEOUS at 22:38

## 2023-09-28 RX ADMIN — APIXABAN 5 MG: 5 TABLET, FILM COATED ORAL at 08:28

## 2023-09-28 RX ADMIN — INSULIN LISPRO 4 UNITS: 100 INJECTION, SOLUTION INTRAVENOUS; SUBCUTANEOUS at 08:29

## 2023-09-28 RX ADMIN — INSULIN LISPRO 2 UNITS: 100 INJECTION, SOLUTION INTRAVENOUS; SUBCUTANEOUS at 17:49

## 2023-09-28 RX ADMIN — HYDROCODONE BITARTRATE AND ACETAMINOPHEN 1 TABLET: 7.5; 325 TABLET ORAL at 02:15

## 2023-09-28 RX ADMIN — HYDROCODONE BITARTRATE AND ACETAMINOPHEN 1 TABLET: 7.5; 325 TABLET ORAL at 22:38

## 2023-09-28 RX ADMIN — INSULIN DETEMIR 40 UNITS: 100 INJECTION, SOLUTION SUBCUTANEOUS at 22:38

## 2023-09-28 RX ADMIN — DIPHENOXYLATE HYDROCHLORIDE AND ATROPINE SULFATE 1 TABLET: 2.5; .025 TABLET ORAL at 08:36

## 2023-09-28 RX ADMIN — Medication: at 13:01

## 2023-09-28 RX ADMIN — MICONAZOLE NITRATE 1 APPLICATION: 2 POWDER TOPICAL at 22:39

## 2023-09-28 RX ADMIN — INSULIN LISPRO 4 UNITS: 100 INJECTION, SOLUTION INTRAVENOUS; SUBCUTANEOUS at 13:01

## 2023-09-28 RX ADMIN — CYANOCOBALAMIN TAB 500 MCG 1000 MCG: 500 TAB at 08:28

## 2023-09-28 RX ADMIN — INSULIN DETEMIR 40 UNITS: 100 INJECTION, SOLUTION SUBCUTANEOUS at 08:29

## 2023-09-28 RX ADMIN — PREGABALIN 50 MG: 25 CAPSULE ORAL at 22:38

## 2023-09-28 RX ADMIN — PREGABALIN 50 MG: 25 CAPSULE ORAL at 08:28

## 2023-09-28 RX ADMIN — ATORVASTATIN CALCIUM 10 MG: 10 TABLET, FILM COATED ORAL at 22:53

## 2023-09-28 RX ADMIN — LISINOPRIL 2.5 MG: 2.5 TABLET ORAL at 13:00

## 2023-09-28 RX ADMIN — MICONAZOLE NITRATE 1 APPLICATION: 2 POWDER TOPICAL at 08:31

## 2023-09-28 RX ADMIN — FAMOTIDINE 40 MG: 20 TABLET, FILM COATED ORAL at 08:28

## 2023-09-28 RX ADMIN — HYDROCODONE BITARTRATE AND ACETAMINOPHEN 1 TABLET: 7.5; 325 TABLET ORAL at 13:00

## 2023-09-28 RX ADMIN — DIPHENOXYLATE HYDROCHLORIDE AND ATROPINE SULFATE 1 TABLET: 2.5; .025 TABLET ORAL at 22:38

## 2023-09-28 RX ADMIN — APIXABAN 5 MG: 5 TABLET, FILM COATED ORAL at 22:38

## 2023-09-28 RX ADMIN — PREGABALIN 50 MG: 25 CAPSULE ORAL at 17:49

## 2023-09-28 NOTE — PLAN OF CARE
Goal Outcome Evaluation:      Patient slept most of the shift only calling out a few times, medicated for pain, per orders, vitals stable, still waiting on placement.

## 2023-09-28 NOTE — PLAN OF CARE
Goal Outcome Evaluation:           Progress: no change  Outcome Evaluation: Patient c/o pain and medicated per MAR. Skin/wound per orders. Continue with plan of care.

## 2023-09-28 NOTE — PROGRESS NOTES
[facility]   Hospitalist Progress Note  Date: 2023  Patient Name: Jose Shaikh  : 1960  MRN: [id number]  Date of admission: 9/10/2023  Room/Bed: 4027/      Subjective   Subjective     Chief Complaint: Weakness    Summary:Jose Shaikh is a 62 y.o. male with multiple medical problems [facility].  He stated that he could barely move or get out of his car so EMS was called.  He said that due to nonweightbearing restrictions he was unable to work on his lower extremity weakness at rehab and was unable to care for himself upon discharge.  Patient had hip and knee injection in hopes that this would help with his pain, he is working better with physical therapy.  He is still unable to walk at this time and  is trying to arrange rehab placement.    Interval Followup: No acute events overnight, patient did stand with physical therapy, still complaining of significant knee and hip pain albeit improving    Objective   Objective     Vitals:   Temp:  [97.5 °F (36.4 °C)-98.8 °F (37.1 °C)] 97.6 °F (36.4 °C)  Heart Rate:  [77-88] 80  Resp:  [16-20] 16  BP: (103-144)/(59-80) 128/80    Physical Exam   GEN: No acute distress  HEENT: Moist mucous membranes  LUNGS: Equal chest rise bilaterally  CARDIAC: Regular rate and rhythm  NEURO: Moving all 4 extremities spontaneously  SKIN: No obvious breakdown    Result Review    Result Review:  I have personally reviewed these results:  [x]  Laboratory      Lab 23  0647 23  0449 23  0529   WBC 5.77 6.02 5.44   HEMOGLOBIN 12.0* 12.2* 11.7*   HEMATOCRIT 39.1 38.9 37.1*   PLATELETS 97* 104* 106*   NEUTROS ABS 3.61 3.95 3.56   IMMATURE GRANS (ABS) 0.01 0.02 0.02   LYMPHS ABS 1.39 1.34 1.21   MONOS ABS 0.63 0.57 0.52   EOS ABS 0.10 0.10 0.08   MCV 81.6 80.5 81.5           Lab 23  0647 23  0449 23  0529   SODIUM 139 136 138   POTASSIUM 4.0 3.9 4.3   CHLORIDE 103 104 103   CO2  28.3 24.1 24.8   ANION GAP 7.7 7.9 10.2   BUN 11 14 15   CREATININE 0.51* 0.50* 0.54*   EGFR 113.2 113.9 111.3   GLUCOSE 133* 197* 246*   CALCIUM 9.0 9.0 8.7   MAGNESIUM 1.9 1.9 1.9   PHOSPHORUS 3.8 3.6 3.6           Lab 09/28/23  0647 09/27/23  0449 09/25/23  0529   TOTAL PROTEIN 6.5 6.4 6.4   ALBUMIN 3.3* 3.4* 3.2*   GLOBULIN 3.2 3.0 3.2   ALT (SGPT) 54* 50* 40   AST (SGOT) 59* 55* 52*   BILIRUBIN 1.1 1.0 0.7   ALK PHOS 196* 198* 189*                         Brief Urine Lab Results  (Last result in the past 365 days)        Color   Clarity   Blood   Leuk Est   Nitrite   Protein   CREAT   Urine HCG        09/11/23 2200 Dark Yellow   Clear   Negative   Negative   Negative   Negative                 [x]  Microbiology   Microbiology Results (last 10 days)       ** No results found for the last 240 hours. **          [x]  Radiology  FL Guide For Pain Meds Inj    Result Date: 9/22/2023   Successful steroid joint injection of the left hip was performed without complication, patient tolerated procedure.  The patient was instructed to obtain follow up care from the referring physician.     Exam directly supervised by Dr. Cole WEINER       Electronically Signed and Approved By: Slava Galvan M.D. on 9/22/2023 at 16:42            []  EKG/Telemetry   []  Cardiology/Vascular   []  Pathology  []  Old records  []  Other:    Assessment & Plan   Assessment / Plan     Assessment:  Generalized weakness  Diabetes  Low sodium likely a lab error.  Resolved  Hypertension  History of atrial fibrillation rate controlled on Eliquis  Morbid obesity.  BMI 42.8  Debility with wheelchair dependent  Chronic wound  Mild transaminitis.  Stable  Plan:    Continue PT/OT  Continue current medications  Continue as needed Lomotil  Continue wound care as needed  Status post IR guided hip injection and knee injection by orthopedic surgery  Vitals reviewed  Continue increased dose of Lyrica for more neuropathic pain control  Orthopedic surgery  consulted, patient status post knee injection  Wants double portions for breakfast  Bowel regimen  Started on Jardiance and lisinopril  Sliding-scale and basal insulin.  Continue increased dose of Levemir for better glucose control, tolerating  Awaiting rehab arrangements.    Reviewed patients labs and imaging, and discussed with patient and nurse at bedside.    DVT prophylaxis:  Medical DVT prophylaxis orders are present.    CODE STATUS:     Electronically signed by Shekhar Au MD, 09/28/23, 5:08 PM EDT.

## 2023-09-29 LAB
GLUCOSE BLDC GLUCOMTR-MCNC: 140 MG/DL (ref 70–99)
GLUCOSE BLDC GLUCOMTR-MCNC: 166 MG/DL (ref 70–99)
GLUCOSE BLDC GLUCOMTR-MCNC: 247 MG/DL (ref 70–99)
GLUCOSE BLDC GLUCOMTR-MCNC: 247 MG/DL (ref 70–99)

## 2023-09-29 PROCEDURE — 82948 REAGENT STRIP/BLOOD GLUCOSE: CPT

## 2023-09-29 PROCEDURE — 63710000001 INSULIN DETEMIR PER 5 UNITS: Performed by: INTERNAL MEDICINE

## 2023-09-29 PROCEDURE — 99232 SBSQ HOSP IP/OBS MODERATE 35: CPT | Performed by: INTERNAL MEDICINE

## 2023-09-29 PROCEDURE — 63710000001 INSULIN LISPRO (HUMAN) PER 5 UNITS: Performed by: FAMILY MEDICINE

## 2023-09-29 RX ORDER — TRAMADOL HYDROCHLORIDE 50 MG/1
50 TABLET ORAL EVERY 6 HOURS PRN
Status: DISPENSED | OUTPATIENT
Start: 2023-09-29 | End: 2023-10-06

## 2023-09-29 RX ADMIN — CYANOCOBALAMIN TAB 500 MCG 1000 MCG: 500 TAB at 10:03

## 2023-09-29 RX ADMIN — HYDROCODONE BITARTRATE AND ACETAMINOPHEN 1 TABLET: 7.5; 325 TABLET ORAL at 09:25

## 2023-09-29 RX ADMIN — INSULIN DETEMIR 40 UNITS: 100 INJECTION, SOLUTION SUBCUTANEOUS at 09:24

## 2023-09-29 RX ADMIN — HYDROCODONE BITARTRATE AND ACETAMINOPHEN 1 TABLET: 7.5; 325 TABLET ORAL at 15:34

## 2023-09-29 RX ADMIN — EMPAGLIFLOZIN 10 MG: 10 TABLET, FILM COATED ORAL at 10:03

## 2023-09-29 RX ADMIN — FAMOTIDINE 40 MG: 20 TABLET, FILM COATED ORAL at 09:24

## 2023-09-29 RX ADMIN — APIXABAN 5 MG: 5 TABLET, FILM COATED ORAL at 21:33

## 2023-09-29 RX ADMIN — APIXABAN 5 MG: 5 TABLET, FILM COATED ORAL at 09:25

## 2023-09-29 RX ADMIN — MICONAZOLE NITRATE 1 APPLICATION: 2 POWDER TOPICAL at 21:00

## 2023-09-29 RX ADMIN — PREGABALIN 50 MG: 25 CAPSULE ORAL at 21:33

## 2023-09-29 RX ADMIN — BACLOFEN 10 MG: 10 TABLET ORAL at 03:37

## 2023-09-29 RX ADMIN — HYDROCODONE BITARTRATE AND ACETAMINOPHEN 1 TABLET: 7.5; 325 TABLET ORAL at 21:33

## 2023-09-29 RX ADMIN — Medication: at 09:25

## 2023-09-29 RX ADMIN — INSULIN DETEMIR 40 UNITS: 100 INJECTION, SOLUTION SUBCUTANEOUS at 21:32

## 2023-09-29 RX ADMIN — TRAMADOL HYDROCHLORIDE 50 MG: 50 TABLET ORAL at 17:54

## 2023-09-29 RX ADMIN — INSULIN LISPRO 4 UNITS: 100 INJECTION, SOLUTION INTRAVENOUS; SUBCUTANEOUS at 09:24

## 2023-09-29 RX ADMIN — INSULIN LISPRO 4 UNITS: 100 INJECTION, SOLUTION INTRAVENOUS; SUBCUTANEOUS at 21:52

## 2023-09-29 RX ADMIN — LISINOPRIL 2.5 MG: 2.5 TABLET ORAL at 10:04

## 2023-09-29 RX ADMIN — PREGABALIN 50 MG: 25 CAPSULE ORAL at 09:24

## 2023-09-29 RX ADMIN — ATORVASTATIN CALCIUM 10 MG: 10 TABLET, FILM COATED ORAL at 21:33

## 2023-09-29 RX ADMIN — INSULIN LISPRO 2 UNITS: 100 INJECTION, SOLUTION INTRAVENOUS; SUBCUTANEOUS at 12:27

## 2023-09-29 RX ADMIN — PREGABALIN 50 MG: 25 CAPSULE ORAL at 15:33

## 2023-09-29 RX ADMIN — HYDROCODONE BITARTRATE AND ACETAMINOPHEN 1 TABLET: 7.5; 325 TABLET ORAL at 03:36

## 2023-09-29 NOTE — PROGRESS NOTES
[facility]   Hospitalist Progress Note  Date: 2023  Patient Name: Jose Shaikh  : 1960  MRN: [id number]  Date of admission: 9/10/2023  Room/Bed: 4027/      Subjective   Subjective     Chief Complaint: Weakness    Summary:Jose Shaikh is a 62 y.o. male with multiple medical problems [facility].  He stated that he could barely move or get out of his car so EMS was called.  He said that due to nonweightbearing restrictions he was unable to work on his lower extremity weakness at rehab and was unable to care for himself upon discharge.  Patient had hip and knee injection in hopes that this would help with his pain, he is working better with physical therapy.  He is still unable to walk at this time and  is trying to arrange rehab placement.    Interval Followup: No acute events overnight, patient did stand with physical therapy, still complaining of significant knee and hip pain albeit improving.  Wants left knee replacement by Dr. Clinton.    Objective   Objective     Vitals:   Temp:  [97.9 °F (36.6 °C)-99 °F (37.2 °C)] 97.9 °F (36.6 °C)  Heart Rate:  [77-87] 82  Resp:  [18-20] 18  BP: (100-134)/(54-84) 113/76    Physical Exam   GEN: No acute distress  HEENT: Moist mucous membranes  LUNGS: Equal chest rise bilaterally  CARDIAC: Regular rate and rhythm  NEURO: Moving all 4 extremities spontaneously  SKIN: No obvious breakdown    Result Review    Result Review:  I have personally reviewed these results:  [x]  Laboratory      Lab 23  0647 23  0449 23  0529   WBC 5.77 6.02 5.44   HEMOGLOBIN 12.0* 12.2* 11.7*   HEMATOCRIT 39.1 38.9 37.1*   PLATELETS 97* 104* 106*   NEUTROS ABS 3.61 3.95 3.56   IMMATURE GRANS (ABS) 0.01 0.02 0.02   LYMPHS ABS 1.39 1.34 1.21   MONOS ABS 0.63 0.57 0.52   EOS ABS 0.10 0.10 0.08   MCV 81.6 80.5 81.5           Lab 23  0647 23  0449 23  0529   SODIUM 139 136 138   POTASSIUM  4.0 3.9 4.3   CHLORIDE 103 104 103   CO2 28.3 24.1 24.8   ANION GAP 7.7 7.9 10.2   BUN 11 14 15   CREATININE 0.51* 0.50* 0.54*   EGFR 113.2 113.9 111.3   GLUCOSE 133* 197* 246*   CALCIUM 9.0 9.0 8.7   MAGNESIUM 1.9 1.9 1.9   PHOSPHORUS 3.8 3.6 3.6           Lab 09/28/23  0647 09/27/23  0449 09/25/23  0529   TOTAL PROTEIN 6.5 6.4 6.4   ALBUMIN 3.3* 3.4* 3.2*   GLOBULIN 3.2 3.0 3.2   ALT (SGPT) 54* 50* 40   AST (SGOT) 59* 55* 52*   BILIRUBIN 1.1 1.0 0.7   ALK PHOS 196* 198* 189*                         Brief Urine Lab Results  (Last result in the past 365 days)        Color   Clarity   Blood   Leuk Est   Nitrite   Protein   CREAT   Urine HCG        09/11/23 2200 Dark Yellow   Clear   Negative   Negative   Negative   Negative                 [x]  Microbiology   Microbiology Results (last 10 days)       ** No results found for the last 240 hours. **          [x]  Radiology  FL Guide For Pain Meds Inj    Result Date: 9/22/2023   Successful steroid joint injection of the left hip was performed without complication, patient tolerated procedure.  The patient was instructed to obtain follow up care from the referring physician.     Exam directly supervised by Dr. Cole WEINER       Electronically Signed and Approved By: Slava Galvan M.D. on 9/22/2023 at 16:42            []  EKG/Telemetry   []  Cardiology/Vascular   []  Pathology  []  Old records  []  Other:    Assessment & Plan   Assessment / Plan     Assessment:  Generalized weakness  Diabetes  Low sodium likely a lab error.  Resolved  Hypertension  History of atrial fibrillation rate controlled on Eliquis  Morbid obesity.  BMI 42.8  Debility with wheelchair dependent  Severe DJD of lower extremities  Chronic wound  Mild transaminitis.  Stable  Plan:    Continue PT/OT  Continue current medications  Continue as needed Lomotil  Continue wound care as needed  Status post IR guided hip injection and knee injection by orthopedic surgery  Vitals reviewed  Continue  increased dose of Lyrica for more neuropathic pain control  Orthopedic surgery consulted, patient status post knee injection.  Wants left knee replacement.  Wants double portions for breakfast  Bowel regimen  Started on Jardiance and lisinopril  Sliding-scale and basal insulin.  Continue increased dose of Levemir for better glucose control, tolerating  Awaiting rehab arrangements.    Reviewed patients labs and imaging, and discussed with patient and nurse at bedside.    DVT prophylaxis:  Medical DVT prophylaxis orders are present.    CODE STATUS:           Electronically signed by Shekhar Au MD, 09/29/23, 5:34 PM EDT.

## 2023-09-29 NOTE — SIGNIFICANT NOTE
Wound Eval / Progress Noted    KEO Dominguez     Patient Name: Jose Shaikh  : 1960  MRN: [id number]  Today's Date: 2023                 Admit Date: 9/10/2023    Visit Dx:    ICD-10-CM ICD-9-CM   1. Generalized weakness  R53.1 780.79   2. Hyponatremia  E87.1 276.1   3. Difficulty in walking  R26.2 719.7   4. Decreased activities of daily living (ADL)  Z78.9 V49.89   5. Difficulty walking  R26.2 719.7         Generalized weakness    Diabetic ulcer of left heel associated with type 2 DM    Paroxysmal atrial fibrillation    Essential hypertension    Foot pain, bilateral    Type 2 diabetes mellitus, with long-term current use of insulin    Class 3 severe obesity due to excess calories with serious comorbidity and body mass index (BMI) of 45.0 to 49.9 in adult    Dependence on wheelchair        Past Medical History:   Diagnosis Date    Absence of toe of right foot     Acute osteomyelitis of left calcaneus  2021    Anxiety and depression     Arthritis     Claustrophobia     Corns and callus     Diabetic ulcer of left heel associated with type 2 DM 2021    Diabetic ulcer of left heel associated with type 2 DM 2021    Diabetic ulcer of right midfoot associated with type 2 DM 2021    Difficulty walking     Essential hypertension 2021    Hammertoe     Hyperlipidemia LDL goal <100 2021    Ingrown toenail     Obesity     Paroxysmal atrial fibrillation 2021    Polyneuropathy     Pressure ulcer, stage 1     Tinea unguium     Type 2 diabetes mellitus with polyneuropathy         Past Surgical History:   Procedure Laterality Date    CYST REMOVAL      center of back; benign    INCISION AND DRAINAGE ABSCESS      back    INCISION AND DRAINAGE LEG Right 12/10/2021    Procedure: INCISION AND DRAINAGE LOWER EXTREMITY;  Surgeon: Ash Leyva DPM;  Location: [facility] MAIN OR;  Service: Podiatry;  Laterality: Right;    OTHER SURGICAL HISTORY      Surgical clips left foot    TOE SURGERY  Right     Removal of 5th toe    TRANS METATARSAL AMPUTATION Right 12/2/2021    Procedure: AMPUTATION TRANS METATARSAL;  Surgeon: Ash Leyva DPM;  Location: [facility] MAIN OR;  Service: Podiatry;  Laterality: Right;    WRIST SURGERY Left     repair of injury         Physical Assessment:  Wound 12/02/21 Right anterior foot Incision (Active)   Wound Image   09/29/23 0928   Dressing Appearance dry;intact 09/29/23 0928   Closure None 09/29/23 0928   Base dry;red 09/29/23 0928   Periwound dry;intact 09/29/23 0928   Periwound Temperature warm 09/29/23 0928   Periwound Skin Turgor soft 09/29/23 0928   Edges rolled/closed 09/29/23 0928   Drainage Amount none 09/29/23 0928   Care, Wound cleansed with;sterile normal saline 09/29/23 0928   Dressing Care dressing applied;petroleum-based;non-adherent;gauze;gauze, dry 09/29/23 0928   Periwound Care absorptive dressing applied 09/29/23 0928          Wound Check / Follow-up:  Patient seen today for wound follow-up and dressing change. Right distal foot with dry, red crusted tissue. Cleansed with normal saline and gauze. Recommending to change treatment to non-adherent petroleum based gauze for added moisture followed by dry gauze and gauze roll for securement. Abdominal and groin folds/creases pink with some moisture noted but have improved. Recommending to continue skin care with application of cornstarch powder. Dry skin remains but improved to BLE. Recommending to continue current skin care and topical treatment. Patient refused assessment of buttocks during visit. He states primary RN has already assessed and performed care this morning. Recommending to continue every two hour turns, offload heels, and keep patient free from moisture/moisture managed.       Impression: Chronic wound to right foot. Dry skin. Moisture within skin folds/creases.      Short term goals: Regain skin integrity, skin protection, pressure reduction, moisture prevention/management, daily  dressing changes, topical treatment, skin care.         Dottie Guevara RN    9/29/2023    13:02 EDT

## 2023-09-29 NOTE — PLAN OF CARE
Goal Outcome Evaluation:  Plan of Care Reviewed With: patient        Progress: no change  Outcome Evaluation: Patient rested in bed throughout the day. Refused to sit up on side of the bed with nurse or PCA. Complaints of pain and tenderness in his left leg; medicated per MAR. Wound care performed by ON nurse. No concerns at this time.

## 2023-09-30 LAB
GLUCOSE BLDC GLUCOMTR-MCNC: 152 MG/DL (ref 70–99)
GLUCOSE BLDC GLUCOMTR-MCNC: 202 MG/DL (ref 70–99)
GLUCOSE BLDC GLUCOMTR-MCNC: 205 MG/DL (ref 70–99)
GLUCOSE BLDC GLUCOMTR-MCNC: 211 MG/DL (ref 70–99)

## 2023-09-30 PROCEDURE — 63710000001 INSULIN DETEMIR PER 5 UNITS: Performed by: INTERNAL MEDICINE

## 2023-09-30 PROCEDURE — 82948 REAGENT STRIP/BLOOD GLUCOSE: CPT

## 2023-09-30 PROCEDURE — 63710000001 INSULIN LISPRO (HUMAN) PER 5 UNITS: Performed by: FAMILY MEDICINE

## 2023-09-30 PROCEDURE — 99232 SBSQ HOSP IP/OBS MODERATE 35: CPT | Performed by: INTERNAL MEDICINE

## 2023-09-30 RX ADMIN — TRAMADOL HYDROCHLORIDE 50 MG: 50 TABLET ORAL at 01:18

## 2023-09-30 RX ADMIN — BACLOFEN 10 MG: 10 TABLET ORAL at 01:10

## 2023-09-30 RX ADMIN — INSULIN LISPRO 4 UNITS: 100 INJECTION, SOLUTION INTRAVENOUS; SUBCUTANEOUS at 12:44

## 2023-09-30 RX ADMIN — INSULIN DETEMIR 40 UNITS: 100 INJECTION, SOLUTION SUBCUTANEOUS at 09:35

## 2023-09-30 RX ADMIN — MICONAZOLE NITRATE 1 APPLICATION: 2 POWDER TOPICAL at 21:28

## 2023-09-30 RX ADMIN — INSULIN DETEMIR 40 UNITS: 100 INJECTION, SOLUTION SUBCUTANEOUS at 21:27

## 2023-09-30 RX ADMIN — HYDROCODONE BITARTRATE AND ACETAMINOPHEN 1 TABLET: 7.5; 325 TABLET ORAL at 08:19

## 2023-09-30 RX ADMIN — INSULIN LISPRO 4 UNITS: 100 INJECTION, SOLUTION INTRAVENOUS; SUBCUTANEOUS at 17:27

## 2023-09-30 RX ADMIN — PREGABALIN 50 MG: 25 CAPSULE ORAL at 09:23

## 2023-09-30 RX ADMIN — FAMOTIDINE 40 MG: 20 TABLET, FILM COATED ORAL at 09:21

## 2023-09-30 RX ADMIN — HYDROCODONE BITARTRATE AND ACETAMINOPHEN 1 TABLET: 7.5; 325 TABLET ORAL at 14:04

## 2023-09-30 RX ADMIN — APIXABAN 5 MG: 5 TABLET, FILM COATED ORAL at 21:28

## 2023-09-30 RX ADMIN — LISINOPRIL 2.5 MG: 2.5 TABLET ORAL at 09:38

## 2023-09-30 RX ADMIN — ATORVASTATIN CALCIUM 10 MG: 10 TABLET, FILM COATED ORAL at 21:28

## 2023-09-30 RX ADMIN — PREGABALIN 50 MG: 25 CAPSULE ORAL at 21:28

## 2023-09-30 RX ADMIN — BACLOFEN 10 MG: 10 TABLET ORAL at 14:19

## 2023-09-30 RX ADMIN — Medication 1 APPLICATION: at 09:27

## 2023-09-30 RX ADMIN — MICONAZOLE NITRATE 1 APPLICATION: 2 POWDER TOPICAL at 09:27

## 2023-09-30 RX ADMIN — HYDROCODONE BITARTRATE AND ACETAMINOPHEN 1 TABLET: 7.5; 325 TABLET ORAL at 21:28

## 2023-09-30 RX ADMIN — PREGABALIN 50 MG: 25 CAPSULE ORAL at 16:58

## 2023-09-30 RX ADMIN — APIXABAN 5 MG: 5 TABLET, FILM COATED ORAL at 09:21

## 2023-09-30 RX ADMIN — INSULIN LISPRO 4 UNITS: 100 INJECTION, SOLUTION INTRAVENOUS; SUBCUTANEOUS at 21:27

## 2023-09-30 RX ADMIN — HYDROCODONE BITARTRATE AND ACETAMINOPHEN 1 TABLET: 7.5; 325 TABLET ORAL at 04:12

## 2023-09-30 RX ADMIN — INSULIN LISPRO 2 UNITS: 100 INJECTION, SOLUTION INTRAVENOUS; SUBCUTANEOUS at 08:31

## 2023-09-30 RX ADMIN — CYANOCOBALAMIN TAB 500 MCG 1000 MCG: 500 TAB at 09:33

## 2023-09-30 RX ADMIN — EMPAGLIFLOZIN 10 MG: 10 TABLET, FILM COATED ORAL at 09:30

## 2023-09-30 NOTE — PLAN OF CARE
Goal Outcome Evaluation:  Plan of Care Reviewed With: patient        Progress: no change  Outcome Evaluation: Pt c/o pain/discomfort this shift, administered prn pain meds as ordered. Pt refused turns/repositon most of the night, education provided at bedside, pt verbally understood and still refused intervention. Skin care provided. No new issues or new needs noted at this time.

## 2023-09-30 NOTE — PROGRESS NOTES
[facility]   Hospitalist Progress Note  Date: 2023  Patient Name: Jose Shaikh  : 1960  MRN: [id number]  Date of admission: 9/10/2023  Room/Bed: 4027/      Subjective   Subjective     Chief Complaint: Weakness    Summary:Jose Shaikh is a 62 y.o. male with multiple medical problems [facility].  He stated that he could barely move or get out of his car so EMS was called.  He said that due to nonweightbearing restrictions he was unable to work on his lower extremity weakness at rehab and was unable to care for himself upon discharge.  Patient had hip and knee injection in hopes that this would help with his pain, he is working better with physical therapy.  He is still unable to walk at this time and  is trying to arrange rehab placement.    Interval Followup:    still complaining of significant knee and hip pain albeit improving.  Wants left knee replacement by Dr. Clinton.    Objective   Objective     Vitals:   Temp:  [97.5 °F (36.4 °C)-99.7 °F (37.6 °C)] 97.9 °F (36.6 °C)  Heart Rate:  [77-89] 84  Resp:  [16-18] 18  BP: (111-147)/(59-71) 147/67    Physical Exam   GEN: No acute distress  HEENT: Moist mucous membranes  LUNGS: Equal chest rise bilaterally  CARDIAC: Regular rate and rhythm  NEURO: Moving all 4 extremities spontaneously  SKIN: No obvious breakdown.  S/p right transmetatarsal amputation well-healed.    Result Review    Result Review:  I have personally reviewed these results:  [x]  Laboratory      Lab 23  0647 23  0449 23  0529   WBC 5.77 6.02 5.44   HEMOGLOBIN 12.0* 12.2* 11.7*   HEMATOCRIT 39.1 38.9 37.1*   PLATELETS 97* 104* 106*   NEUTROS ABS 3.61 3.95 3.56   IMMATURE GRANS (ABS) 0.01 0.02 0.02   LYMPHS ABS 1.39 1.34 1.21   MONOS ABS 0.63 0.57 0.52   EOS ABS 0.10 0.10 0.08   MCV 81.6 80.5 81.5           Lab 23  0647 23  0449 23  0529   SODIUM 139 136 138   POTASSIUM 4.0 3.9 4.3    CHLORIDE 103 104 103   CO2 28.3 24.1 24.8   ANION GAP 7.7 7.9 10.2   BUN 11 14 15   CREATININE 0.51* 0.50* 0.54*   EGFR 113.2 113.9 111.3   GLUCOSE 133* 197* 246*   CALCIUM 9.0 9.0 8.7   MAGNESIUM 1.9 1.9 1.9   PHOSPHORUS 3.8 3.6 3.6           Lab 09/28/23  0647 09/27/23  0449 09/25/23  0529   TOTAL PROTEIN 6.5 6.4 6.4   ALBUMIN 3.3* 3.4* 3.2*   GLOBULIN 3.2 3.0 3.2   ALT (SGPT) 54* 50* 40   AST (SGOT) 59* 55* 52*   BILIRUBIN 1.1 1.0 0.7   ALK PHOS 196* 198* 189*                         Brief Urine Lab Results  (Last result in the past 365 days)        Color   Clarity   Blood   Leuk Est   Nitrite   Protein   CREAT   Urine HCG        09/11/23 2200 Dark Yellow   Clear   Negative   Negative   Negative   Negative                 [x]  Microbiology   Microbiology Results (last 10 days)       ** No results found for the last 240 hours. **          [x]  Radiology  No radiology results for the last 7 days  []  EKG/Telemetry   []  Cardiology/Vascular   []  Pathology  []  Old records  []  Other:    Assessment & Plan   Assessment / Plan     Assessment:  Generalized weakness  Diabetes  Low sodium likely a lab error.  Resolved  Hypertension  History of atrial fibrillation rate controlled on Eliquis  Morbid obesity.  BMI 42.8  Debility with wheelchair dependent  Severe DJD of lower extremities  Chronic wound  Mild transaminitis.  Stable  S/p right transmetatarsal amputation    Plan:    Continue PT/OT  Continue current medications  Continue as needed Lomotil  Continue wound care as needed  Status post IR guided hip injection and knee injection by orthopedic surgery  Vitals reviewed  Continue increased dose of Lyrica for more neuropathic pain control.  Norco and tramadol as needed.  Orthopedic surgery consulted, patient status post knee injection.  Wants left knee replacement.  Wants double portions for breakfast  Bowel regimen  Started on Jardiance and lisinopril  Sliding-scale and basal insulin.  Continue increased dose of  Levemir for better glucose control, tolerating  Awaiting rehab arrangements.    Reviewed patients labs and imaging, and discussed with patient and nurse at bedside.    DVT prophylaxis:  Medical DVT prophylaxis orders are present.    CODE STATUS:         Electronically signed by Shekhar Au MD, 09/30/23, 4:33 PM EDT.

## 2023-09-30 NOTE — PLAN OF CARE
Goal Outcome Evaluation:  Plan of Care Reviewed With: patient           Outcome Evaluation: Patient is alert and oriented and speaking with RN throughout shif, patient has complained of pain and muscle spasms, patient was given Norco x2 and Baclofen x1 at this time. Patient wound care provided per orders by student nurse and instructor during shift. Patient shows no signs or symptoms of distress at this time and none are reported to RN at this time. Patient has call light within reach and is educated and aware of how to use at this time.

## 2023-10-01 LAB
GLUCOSE BLDC GLUCOMTR-MCNC: 149 MG/DL (ref 70–99)
GLUCOSE BLDC GLUCOMTR-MCNC: 156 MG/DL (ref 70–99)
GLUCOSE BLDC GLUCOMTR-MCNC: 173 MG/DL (ref 70–99)
GLUCOSE BLDC GLUCOMTR-MCNC: 299 MG/DL (ref 70–99)

## 2023-10-01 PROCEDURE — 97110 THERAPEUTIC EXERCISES: CPT

## 2023-10-01 PROCEDURE — 97530 THERAPEUTIC ACTIVITIES: CPT

## 2023-10-01 PROCEDURE — 63710000001 INSULIN DETEMIR PER 5 UNITS: Performed by: INTERNAL MEDICINE

## 2023-10-01 PROCEDURE — 82948 REAGENT STRIP/BLOOD GLUCOSE: CPT

## 2023-10-01 PROCEDURE — 63710000001 INSULIN LISPRO (HUMAN) PER 5 UNITS: Performed by: FAMILY MEDICINE

## 2023-10-01 PROCEDURE — 99232 SBSQ HOSP IP/OBS MODERATE 35: CPT | Performed by: INTERNAL MEDICINE

## 2023-10-01 RX ADMIN — HYDROCODONE BITARTRATE AND ACETAMINOPHEN 1 TABLET: 7.5; 325 TABLET ORAL at 22:29

## 2023-10-01 RX ADMIN — PREGABALIN 50 MG: 25 CAPSULE ORAL at 08:34

## 2023-10-01 RX ADMIN — FAMOTIDINE 40 MG: 20 TABLET, FILM COATED ORAL at 08:34

## 2023-10-01 RX ADMIN — EMPAGLIFLOZIN 10 MG: 10 TABLET, FILM COATED ORAL at 08:34

## 2023-10-01 RX ADMIN — ATORVASTATIN CALCIUM 10 MG: 10 TABLET, FILM COATED ORAL at 20:01

## 2023-10-01 RX ADMIN — Medication: at 09:31

## 2023-10-01 RX ADMIN — PREGABALIN 50 MG: 25 CAPSULE ORAL at 20:01

## 2023-10-01 RX ADMIN — INSULIN DETEMIR 40 UNITS: 100 INJECTION, SOLUTION SUBCUTANEOUS at 20:01

## 2023-10-01 RX ADMIN — BACLOFEN 10 MG: 10 TABLET ORAL at 04:14

## 2023-10-01 RX ADMIN — INSULIN LISPRO 2 UNITS: 100 INJECTION, SOLUTION INTRAVENOUS; SUBCUTANEOUS at 17:42

## 2023-10-01 RX ADMIN — INSULIN LISPRO 6 UNITS: 100 INJECTION, SOLUTION INTRAVENOUS; SUBCUTANEOUS at 20:01

## 2023-10-01 RX ADMIN — HYDROCODONE BITARTRATE AND ACETAMINOPHEN 1 TABLET: 7.5; 325 TABLET ORAL at 16:17

## 2023-10-01 RX ADMIN — CYANOCOBALAMIN TAB 500 MCG 1000 MCG: 500 TAB at 08:34

## 2023-10-01 RX ADMIN — INSULIN DETEMIR 40 UNITS: 100 INJECTION, SOLUTION SUBCUTANEOUS at 08:35

## 2023-10-01 RX ADMIN — HYDROCODONE BITARTRATE AND ACETAMINOPHEN 1 TABLET: 7.5; 325 TABLET ORAL at 10:25

## 2023-10-01 RX ADMIN — PREGABALIN 50 MG: 25 CAPSULE ORAL at 16:17

## 2023-10-01 RX ADMIN — MICONAZOLE NITRATE 1 APPLICATION: 2 POWDER TOPICAL at 20:02

## 2023-10-01 RX ADMIN — HYDROCODONE BITARTRATE AND ACETAMINOPHEN 1 TABLET: 7.5; 325 TABLET ORAL at 04:14

## 2023-10-01 RX ADMIN — BACLOFEN 10 MG: 10 TABLET ORAL at 16:17

## 2023-10-01 RX ADMIN — INSULIN LISPRO 2 UNITS: 100 INJECTION, SOLUTION INTRAVENOUS; SUBCUTANEOUS at 12:46

## 2023-10-01 RX ADMIN — APIXABAN 5 MG: 5 TABLET, FILM COATED ORAL at 08:35

## 2023-10-01 RX ADMIN — MICONAZOLE NITRATE 1 APPLICATION: 2 POWDER TOPICAL at 08:35

## 2023-10-01 RX ADMIN — LISINOPRIL 2.5 MG: 2.5 TABLET ORAL at 08:34

## 2023-10-01 RX ADMIN — APIXABAN 5 MG: 5 TABLET, FILM COATED ORAL at 20:01

## 2023-10-01 NOTE — PROGRESS NOTES
[facility]   Hospitalist Progress Note  Date: 10/1/2023  Patient Name: Jose Shaikh  : 1960  MRN: [id number]  Date of admission: 9/10/2023  Room/Bed: 4027/1      Subjective   Subjective     Chief Complaint: Weakness    Summary:Jose Shaikh is a 62 y.o. male with multiple medical problems [facility].  He stated that he could barely move or get out of his car so EMS was called.  He said that due to nonweightbearing restrictions he was unable to work on his lower extremity weakness at rehab and was unable to care for himself upon discharge.  Patient had hip and knee injection in hopes that this would help with his pain, he is working better with physical therapy.  He is still unable to walk at this time and  is trying to arrange rehab placement.    Interval Followup:    still complaining of significant left knee more than hip pain albeit improving.  Wants left knee replacement by Dr. Clinton or orthopedic surgery.    Objective   Objective     Vitals:   Temp:  [97.5 °F (36.4 °C)-98.3 °F (36.8 °C)] 98.3 °F (36.8 °C)  Heart Rate:  [23-83] 81  Resp:  [18] 18  BP: (106-132)/(44-67) 106/44    Physical Exam   GEN: No acute distress  HEENT: Moist mucous membranes  LUNGS: Equal chest rise bilaterally  CARDIAC: Regular rate and rhythm  NEURO: Moving all 4 extremities spontaneously  SKIN: No obvious breakdown.  S/p right transmetatarsal amputation well-healed.    Result Review    Result Review:  I have personally reviewed these results:  [x]  Laboratory      Lab 23  0647 23  0449 23  0529   WBC 5.77 6.02 5.44   HEMOGLOBIN 12.0* 12.2* 11.7*   HEMATOCRIT 39.1 38.9 37.1*   PLATELETS 97* 104* 106*   NEUTROS ABS 3.61 3.95 3.56   IMMATURE GRANS (ABS) 0.01 0.02 0.02   LYMPHS ABS 1.39 1.34 1.21   MONOS ABS 0.63 0.57 0.52   EOS ABS 0.10 0.10 0.08   MCV 81.6 80.5 81.5           Lab 23  0647 23  0449 23  0529   SODIUM 139  136 138   POTASSIUM 4.0 3.9 4.3   CHLORIDE 103 104 103   CO2 28.3 24.1 24.8   ANION GAP 7.7 7.9 10.2   BUN 11 14 15   CREATININE 0.51* 0.50* 0.54*   EGFR 113.2 113.9 111.3   GLUCOSE 133* 197* 246*   CALCIUM 9.0 9.0 8.7   MAGNESIUM 1.9 1.9 1.9   PHOSPHORUS 3.8 3.6 3.6           Lab 09/28/23  0647 09/27/23  0449 09/25/23  0529   TOTAL PROTEIN 6.5 6.4 6.4   ALBUMIN 3.3* 3.4* 3.2*   GLOBULIN 3.2 3.0 3.2   ALT (SGPT) 54* 50* 40   AST (SGOT) 59* 55* 52*   BILIRUBIN 1.1 1.0 0.7   ALK PHOS 196* 198* 189*                         Brief Urine Lab Results  (Last result in the past 365 days)        Color   Clarity   Blood   Leuk Est   Nitrite   Protein   CREAT   Urine HCG        09/11/23 2200 Dark Yellow   Clear   Negative   Negative   Negative   Negative                 [x]  Microbiology   Microbiology Results (last 10 days)       ** No results found for the last 240 hours. **          [x]  Radiology  No radiology results for the last 7 days  []  EKG/Telemetry   []  Cardiology/Vascular   []  Pathology  []  Old records  []  Other:    Assessment & Plan   Assessment / Plan     Assessment:  Generalized weakness  Diabetes  Low sodium likely a lab error.  Resolved  Hypertension  History of atrial fibrillation rate controlled on Eliquis  Morbid obesity.  BMI 42.8  Debility with wheelchair dependent  Severe DJD of lower extremities  Chronic wound  Mild transaminitis.  Stable  S/p right transmetatarsal amputation    Plan:    Continue PT/OT  Continue current medications  Continue as needed Lomotil  Continue wound care as needed  Status post IR guided hip injection and knee injection by orthopedic surgery  Vitals reviewed  Continue increased dose of Lyrica for more neuropathic pain control.  Norco and tramadol as needed.  Orthopedic surgery consulted, patient status post knee injection.  Wants left knee replacement.  Wants reevaluation for left knee replacement  Wants double portions for breakfast  Bowel regimen  Started on Jardiance and  lisinopril  Sliding-scale and basal insulin.  Continue increased dose of Levemir for better glucose control, tolerating  Awaiting rehab arrangements.    Reviewed patients labs and imaging, and discussed with patient and nurse at bedside.    DVT prophylaxis:  Medical DVT prophylaxis orders are present.    CODE STATUS:         Electronically signed by Shekhar Au MD, 10/01/23, 4:17 PM EDT.

## 2023-10-01 NOTE — PLAN OF CARE
Goal Outcome Evaluation:  Plan of Care Reviewed With: patient        Progress: no change  Outcome Evaluation: Patient is alert and oriented and speaking with RN throughout shift, patient has had no signs or symptoms of distress at this time and none are reported to RN . Patient has recieved Norco x1 at this time, wound care has been provided per orders, patient has call light within reach and is educated and aware of how to use at this time.

## 2023-10-01 NOTE — THERAPY TREATMENT NOTE
Acute Care - Physical Therapy Treatment Note  KEO Dominguez     Patient Name: Jose Shaikh  : 1960  MRN: [id number]  Today's Date: 10/1/2023      Visit Dx:     ICD-10-CM ICD-9-CM   1. Generalized weakness  R53.1 780.79   2. Hyponatremia  E87.1 276.1   3. Difficulty in walking  R26.2 719.7   4. Decreased activities of daily living (ADL)  Z78.9 V49.89   5. Difficulty walking  R26.2 719.7     Patient Active Problem List   Diagnosis    Diabetic ulcer of left heel associated with type 2 DM    Acute osteomyelitis of left calcaneus     Diabetic ulcer of left heel associated with type 2 DM    Diabetic ulcer of right midfoot associated with type 2 DM    Paroxysmal atrial fibrillation    Essential hypertension    Hyperlipidemia LDL goal <100    Cellulitis and abscess of foot    High alkaline phosphatase level    Osteomyelitis    Onychomycosis    Onychocryptosis    Foot pain, bilateral    Osteomyelitis of foot, right, acute    Cellulitis of right foot    Type 2 diabetes mellitus, with long-term current use of insulin    Class 3 severe obesity due to excess calories with serious comorbidity and body mass index (BMI) of 45.0 to 49.9 in adult    Anxiety disorder, unspecified    Claustrophobia    Dependence on wheelchair    Depression, unspecified    Long term (current) use of anticoagulants    Long term (current) use of oral hypoglycemic drugs    Wound of foot    Non-prs chronic ulcer oth prt r foot limited to brkdwn skin    Orthostatic hypotension    Other chronic osteomyelitis, right ankle and foot    Personal history of nicotine dependence    Thrombocytopenia, unspecified    Unspecified open wound, right foot, initial encounter    Diabetic foot infection    Subacute osteomyelitis of right foot    Right foot pain    Sepsis    Onychomycosis    Foot pain, left    Impaired mobility and ADLs    Absence of toe of right foot    Corns and callosity    Disability of walking    Fracture    Limb swelling    Polyneuropathy     Pressure ulcer, stage 1    Shortness of breath    Generalized weakness     Past Medical History:   Diagnosis Date    Absence of toe of right foot     Acute osteomyelitis of left calcaneus  8/18/2021    Anxiety and depression     Arthritis     Claustrophobia     Corns and callus     Diabetic ulcer of left heel associated with type 2 DM 8/18/2021    Diabetic ulcer of left heel associated with type 2 DM 7/6/2021    Diabetic ulcer of right midfoot associated with type 2 DM 8/18/2021    Difficulty walking     Essential hypertension 8/31/2021    Hammertoe     Hyperlipidemia LDL goal <100 8/31/2021    Ingrown toenail     Obesity     Paroxysmal atrial fibrillation 8/31/2021    Polyneuropathy     Pressure ulcer, stage 1     Tinea unguium     Type 2 diabetes mellitus with polyneuropathy      Past Surgical History:   Procedure Laterality Date    CYST REMOVAL      center of back; benign    INCISION AND DRAINAGE ABSCESS      back    INCISION AND DRAINAGE LEG Right 12/10/2021    Procedure: INCISION AND DRAINAGE LOWER EXTREMITY;  Surgeon: Ash Leyva DPM;  Location: [facility];  Service: Podiatry;  Laterality: Right;    OTHER SURGICAL HISTORY      Surgical clips left foot    TOE SURGERY Right     Removal of 5th toe    TRANS METATARSAL AMPUTATION Right 12/2/2021    Procedure: AMPUTATION TRANS METATARSAL;  Surgeon: Ash Leyva DPM;  Location: [facility] OR;  Service: Podiatry;  Laterality: Right;    WRIST SURGERY Left     repair of injury     PT Assessment (last 12 hours)       PT Evaluation and Treatment       Row Name 10/01/23 2136          Physical Therapy Time and Intention    Subjective Information complains of;weakness;fatigue;pain  -DK     Document Type therapy note (daily note)  -DK     Mode of Treatment individual therapy;physical therapy  -DK     Patient Effort good  -DK     Symptoms Noted During/After Treatment fatigue;increased pain  -DK     Comment Pt was able to participate in exercises and  transfers to sitting, but declined standing transfers due to c/o knee pain.  -       Row Name 10/01/23 1141          Pain    Pretreatment Pain Rating 2/10  -DK     Posttreatment Pain Rating 6/10  -DK     Pain Location - Side/Orientation Left  -DK     Pain Location generalized  -DK     Pain Location - hip;knee  -DK     Pain Intervention(s) Repositioned;Ambulation/increased activity;Distraction;Therapeutic presence  -DK       Row Name 10/01/23 1141          Cognition    Affect/Mental Status (Cognition) confabulatory  -DK     Orientation Status (Cognition) oriented x 4  -DK     Follows Commands (Cognition) WFL  -DK     Cognitive Function WFL  -DK     Personal Safety Interventions gait belt;nonskid shoes/slippers when out of bed;supervised activity  -       Row Name 10/01/23 1141          Bed Mobility    Bed Mobility scooting/bridging;supine-sit-supine  -DK     All Activities, [place] (Bed Mobility) minimum assist (75% patient effort);1 person assist  -DK     Scooting/Bridging [place] (Bed Mobility) minimum assist (75% patient effort);1 person assist  -DK     Supine-Sit [place] (Bed Mobility) standby assist  -DK     Sit-Supine [place] (Bed Mobility) contact guard;1 person assist  -DK     Supine-Sit-Supine [place] (Bed Mobility) standby assist;contact guard;1 person assist  -DK     Bed Mobility, Safety Issues decreased use of legs for bridging/pushing  -DK     Assistive Device (Bed Mobility) bed rails;draw sheet  -DK     Comment, (Bed Mobility) Pt sat EOB x 8-10 minutes before returning to bed on alert post treatment.  -       Row Name 10/01/23 1141          Balance    Balance Assessment sitting static balance;sitting dynamic balance  -DK     Static Sitting Balance standby assist  -DK     Dynamic Sitting Balance standby assist  -DK     Position, Sitting Balance unsupported;sitting edge of bed  -DK     Balance Interventions sitting;static;dynamic  -       Row Name 10/01/23 1141           Motor Skills    Motor Skills --  therapeutic exercises  -     Coordination WFL  -     Therapeutic Exercise hip;knee;ankle  -       Row Name 10/01/23 1141          Hip (Therapeutic Exercise)    Hip (Therapeutic Exercise) AROM (active range of motion)  -     Hip AROM (Therapeutic Exercise) bilateral;flexion;extension;aBduction;aDduction;sitting;supine;15 repititions  -       Row Name 10/01/23 1141          Knee (Therapeutic Exercise)    Knee (Therapeutic Exercise) AROM (active range of motion)  -     Knee AROM (Therapeutic Exercise) bilateral;flexion;extension;LAQ (long arc quad);heel slides;SLR (straight leg raise);sitting;supine;15 repititions  -       Row Name 10/01/23 1141          Ankle (Therapeutic Exercise)    Ankle (Therapeutic Exercise) AROM (active range of motion)  -     Ankle AROM (Therapeutic Exercise) bilateral;dorsiflexion;plantarflexion;sitting;supine;20 repititions  -       Row Name             Wound 09/10/23 2345 Right anterior hip MASD (Moisture associated skin damage)    Wound - Properties Group Placement Date: 09/10/23  -BL Placement Time: 2345  -BL Present on Hospital Admission: Y  -BL Side: Right  -BL Orientation: anterior  -BL Location: hip  -BL Primary Wound Type: MASD  -BL    Retired Wound - Properties Group Placement Date: 09/10/23  -BL Placement Time: 2345  -BL Present on Hospital Admission: Y  -BL Side: Right  -BL Orientation: anterior  -BL Location: hip  -BL Primary Wound Type: MASD  -BL    Retired Wound - Properties Group Date first assessed: 09/10/23  -BL Time first assessed: 2345  -BL Present on Hospital Admission: Y  -BL Side: Right  -BL Location: hip  -BL Primary Wound Type: MASD  -BL      Row Name             Wound 12/02/21 Right anterior foot Incision    Wound - Properties Group Placement Date: 12/02/21  -ES Side: Right  -ES Orientation: anterior  -ES Location: foot  -ES Primary Wound Type: Incision  -ES    Retired Wound - Properties Group Placement Date: 12/02/21   -ES Side: Right  -ES Orientation: anterior  -ES Location: foot  -ES Primary Wound Type: Incision  -ES    Retired Wound - Properties Group Date first assessed: 12/02/21  -ES Side: Right  -ES Location: foot  -ES Primary Wound Type: Incision  -ES      Row Name             Wound 09/15/23 Right gluteal    Wound - Properties Group Placement Date: 09/15/23  -NH Side: Right  -NH Location: gluteal  -NH    Retired Wound - Properties Group Placement Date: 09/15/23  -NH Side: Right  -NH Location: gluteal  -NH    Retired Wound - Properties Group Date first assessed: 09/15/23  -NH Side: Right  -NH Location: gluteal  -NH      Row Name 10/01/23 1141          Plan of Care Review    Plan of Care Reviewed With patient  -DK     Progress improving  -DK       Row Name 10/01/23 1141          Positioning and Restraints    Pre-Treatment Position in bed  -DK     Post Treatment Position bed  -DK     In Bed supine;call light within reach;encouraged to call for assist;exit alarm on;side rails up x3;legs elevated;heels elevated  -DK       Row Name 10/01/23 1141          Therapy Assessment/Plan (PT)    Rehab Potential (PT) fair, will monitor progress closely  -DK     Criteria for Skilled Interventions Met (PT) skilled treatment is necessary  -DK     Therapy Frequency (PT) daily  -DK     Problem List (PT) problems related to;balance;mobility;range of motion (ROM);strength;pain  -DK     Activity Limitations Related to Problem List (PT) unable to ambulate safely;unable to transfer safely  -DK       Row Name 10/01/23 1141          Progress Summary (PT)    Progress Toward Functional Goals (PT) progress toward functional goals is fair  -DK               User Key  (r) = Recorded By, (t) = Taken By, (c) = Cosigned By      Initials Name Provider Type    Sarah Bernstein, RN Registered Nurse    Dottie Foster RN Registered Nurse    Mckenna Landaverde PTA Physical Therapist Assistant    Katlyn Garcia RN Registered Nurse                     Physical Therapy Education       Title: PT OT SLP Therapies (Done)       Topic: Physical Therapy (Done)       Point: Mobility training (Done)       Learning Progress Summary             Patient Acceptance, E, VU,NR by RASHARD at 9/24/2023 0604    Acceptance, E, VU,DU by RF at 9/18/2023 1537    Acceptance, E, VU,DU by RF at 9/17/2023 1515    Acceptance, E, VU,DU by RF at 9/16/2023 1804    Acceptance, E, VU by AV at 9/13/2023 1059    Acceptance, E,TB, VU by  at 9/12/2023 1308                         Point: Home exercise program (Done)       Learning Progress Summary             Patient Acceptance, E, VU,NR by RASHARD at 9/24/2023 0604    Acceptance, E, VU,DU by RF at 9/18/2023 1537    Acceptance, E, VU,DU by RF at 9/17/2023 1515    Acceptance, E, VU,DU by RF at 9/16/2023 1804    Acceptance, E, VU by AV at 9/13/2023 1059    Acceptance, E,TB, VU by  at 9/12/2023 1308                         Point: Body mechanics (Done)       Learning Progress Summary             Patient Acceptance, E, VU,NR by RASHARD at 9/24/2023 0604    Acceptance, E, VU,DU by RF at 9/18/2023 1537    Acceptance, E, VU,DU by RF at 9/17/2023 1515    Acceptance, E, VU,DU by RF at 9/16/2023 1804    Acceptance, E, VU by AV at 9/13/2023 1059    Acceptance, E,TB, VU by  at 9/12/2023 1308                         Point: Precautions (Done)       Learning Progress Summary             Patient Acceptance, E, VU,NR by RASHARD at 9/24/2023 0604    Acceptance, E, VU,DU by RF at 9/18/2023 1537    Acceptance, E, VU,DU by RF at 9/17/2023 1515    Acceptance, E, VU,DU by RF at 9/16/2023 1804    Acceptance, E, VU by AV at 9/13/2023 1059    Acceptance, E,TB, VU by  at 9/12/2023 1308                                         User Key       Initials Effective Dates Name Provider Type Discipline    RASHARD 06/16/21 -  Greenwood, Selena, RN Registered Nurse Nurse    AV 06/16/21 -  Uvaldo Christine OT Occupational Therapist OT     01/19/22 -  Karl Baker, RN Registered Nurse Nurse      08/16/23 -  Gamal Simmons, PT Student PT Student PT                  PT Recommendation and Plan  Planned Therapy Interventions (PT): balance training, bed mobility training, gait training, home exercise program, strengthening, transfer training  Therapy Frequency (PT): daily  Progress Summary (PT)  Progress Toward Functional Goals (PT): progress toward functional goals is fair  Plan of Care Reviewed With: patient  Progress: improving   Outcome Measures       Row Name 10/01/23 1141             How much help from another person do you currently need...    Turning from your back to your side while in flat bed without using bedrails? 3  -DK      Moving from lying on back to sitting on the side of a flat bed without bedrails? 3  -DK      Moving to and from a bed to a chair (including a wheelchair)? 1  -DK      Standing up from a chair using your arms (e.g., wheelchair, bedside chair)? 2  -DK      Climbing 3-5 steps with a railing? 1  -DK      To walk in hospital room? 1  -DK      AM-PAC 6 Clicks Score (PT) 11  -DK         Functional Assessment    Outcome Measure Options AM-PAC 6 Clicks Basic Mobility (PT)  -DK                User Key  (r) = Recorded By, (t) = Taken By, (c) = Cosigned By      Initials Name Provider Type    Mckenna Landaverde PTA Physical Therapist Assistant                     Time Calculation:    PT Charges       Row Name 10/01/23 1147             Time Calculation    PT Received On 10/01/23  -DK      PT Goal Re-Cert Due Date 10/06/23  -DK         Timed Charges    69589 - PT Therapeutic Exercise Minutes 22  -DK      21937 - PT Therapeutic Activity Minutes 16  -DK         Total Minutes    Timed Charges Total Minutes 38  -DK       Total Minutes 38  -DK                User Key  (r) = Recorded By, (t) = Taken By, (c) = Cosigned By      Initials Name Provider Type    Mckenna Landaverde PTA Physical Therapist Assistant                  Therapy Charges for Today       Code Description Service Date  Service Provider Modifiers Qty    26964061228 HC PT THER PROC EA 15 MIN 10/1/2023 Mckenna Wong, CURTIS GP 2    63711382416 HC PT THERAPEUTIC ACT EA 15 MIN 10/1/2023 Mckenna Wong PTA GP 1            PT G-Codes  Outcome Measure Options: AM-PAC 6 Clicks Basic Mobility (PT)  AM-PAC 6 Clicks Score (PT): 11  AM-PAC 6 Clicks Score (OT): 17    Mckenna Wong PTA  10/1/2023

## 2023-10-02 LAB
GLUCOSE BLDC GLUCOMTR-MCNC: 154 MG/DL (ref 70–99)
GLUCOSE BLDC GLUCOMTR-MCNC: 169 MG/DL (ref 70–99)
GLUCOSE BLDC GLUCOMTR-MCNC: 220 MG/DL (ref 70–99)
GLUCOSE BLDC GLUCOMTR-MCNC: 292 MG/DL (ref 70–99)

## 2023-10-02 PROCEDURE — 82948 REAGENT STRIP/BLOOD GLUCOSE: CPT

## 2023-10-02 PROCEDURE — 99232 SBSQ HOSP IP/OBS MODERATE 35: CPT | Performed by: STUDENT IN AN ORGANIZED HEALTH CARE EDUCATION/TRAINING PROGRAM

## 2023-10-02 PROCEDURE — 63710000001 INSULIN LISPRO (HUMAN) PER 5 UNITS: Performed by: FAMILY MEDICINE

## 2023-10-02 PROCEDURE — 99232 SBSQ HOSP IP/OBS MODERATE 35: CPT | Performed by: INTERNAL MEDICINE

## 2023-10-02 PROCEDURE — 63710000001 INSULIN DETEMIR PER 5 UNITS: Performed by: INTERNAL MEDICINE

## 2023-10-02 RX ORDER — HYDROCODONE BITARTRATE AND ACETAMINOPHEN 7.5; 325 MG/1; MG/1
1 TABLET ORAL EVERY 6 HOURS PRN
Status: DISCONTINUED | OUTPATIENT
Start: 2023-10-02 | End: 2023-11-06 | Stop reason: HOSPADM

## 2023-10-02 RX ADMIN — APIXABAN 5 MG: 5 TABLET, FILM COATED ORAL at 22:29

## 2023-10-02 RX ADMIN — INSULIN DETEMIR 40 UNITS: 100 INJECTION, SOLUTION SUBCUTANEOUS at 08:31

## 2023-10-02 RX ADMIN — MICONAZOLE NITRATE 1 APPLICATION: 2 POWDER TOPICAL at 08:32

## 2023-10-02 RX ADMIN — INSULIN LISPRO 6 UNITS: 100 INJECTION, SOLUTION INTRAVENOUS; SUBCUTANEOUS at 22:29

## 2023-10-02 RX ADMIN — EMPAGLIFLOZIN 10 MG: 10 TABLET, FILM COATED ORAL at 08:31

## 2023-10-02 RX ADMIN — INSULIN LISPRO 2 UNITS: 100 INJECTION, SOLUTION INTRAVENOUS; SUBCUTANEOUS at 13:06

## 2023-10-02 RX ADMIN — Medication: at 09:16

## 2023-10-02 RX ADMIN — HYDROCODONE BITARTRATE AND ACETAMINOPHEN 1 TABLET: 7.5; 325 TABLET ORAL at 04:19

## 2023-10-02 RX ADMIN — PREGABALIN 50 MG: 25 CAPSULE ORAL at 22:29

## 2023-10-02 RX ADMIN — INSULIN DETEMIR 40 UNITS: 100 INJECTION, SOLUTION SUBCUTANEOUS at 22:29

## 2023-10-02 RX ADMIN — CYANOCOBALAMIN TAB 500 MCG 1000 MCG: 500 TAB at 08:31

## 2023-10-02 RX ADMIN — FAMOTIDINE 40 MG: 20 TABLET, FILM COATED ORAL at 08:31

## 2023-10-02 RX ADMIN — ATORVASTATIN CALCIUM 10 MG: 10 TABLET, FILM COATED ORAL at 22:29

## 2023-10-02 RX ADMIN — INSULIN LISPRO 4 UNITS: 100 INJECTION, SOLUTION INTRAVENOUS; SUBCUTANEOUS at 18:02

## 2023-10-02 RX ADMIN — PREGABALIN 50 MG: 25 CAPSULE ORAL at 08:31

## 2023-10-02 RX ADMIN — BACLOFEN 10 MG: 10 TABLET ORAL at 16:26

## 2023-10-02 RX ADMIN — HYDROCODONE BITARTRATE AND ACETAMINOPHEN 1 TABLET: 7.5; 325 TABLET ORAL at 16:26

## 2023-10-02 RX ADMIN — PREGABALIN 50 MG: 25 CAPSULE ORAL at 16:26

## 2023-10-02 RX ADMIN — APIXABAN 5 MG: 5 TABLET, FILM COATED ORAL at 08:31

## 2023-10-02 RX ADMIN — INSULIN LISPRO 2 UNITS: 100 INJECTION, SOLUTION INTRAVENOUS; SUBCUTANEOUS at 08:31

## 2023-10-02 RX ADMIN — HYDROCODONE BITARTRATE AND ACETAMINOPHEN 1 TABLET: 7.5; 325 TABLET ORAL at 09:16

## 2023-10-02 RX ADMIN — BACLOFEN 10 MG: 10 TABLET ORAL at 04:19

## 2023-10-02 RX ADMIN — HYDROCODONE BITARTRATE AND ACETAMINOPHEN 1 TABLET: 7.5; 325 TABLET ORAL at 22:29

## 2023-10-02 RX ADMIN — MICONAZOLE NITRATE 1 APPLICATION: 2 POWDER TOPICAL at 22:30

## 2023-10-02 RX ADMIN — LISINOPRIL 2.5 MG: 2.5 TABLET ORAL at 08:31

## 2023-10-02 NOTE — PROGRESS NOTES
[facility]   Hospitalist Progress Note  Date: 10/2/2023  Patient Name: Jose Shaikh  : 1960  MRN: [id number]  Date of admission: 9/10/2023  Room/Bed: 4027/1      Subjective   Subjective     Chief Complaint: Weakness    Summary:Jose Shaikh is a 62 y.o. male with multiple medical problems [facility].  He stated that he could barely move or get out of his car so EMS was called.  He said that due to nonweightbearing restrictions he was unable to work on his lower extremity weakness at rehab and was unable to care for himself upon discharge.  Patient had hip and knee injection in hopes that this would help with his pain, he is working better with physical therapy.  He is still unable to walk at this time and  is trying to arrange rehab placement.  Patient again seen by orthopedic surgery on  on patient request to be evaluated for left knee total arthroplasty.  Per Ortho  surgery patient is not a candidate for left total knee arthroplasty because of morbid obesity, diabetes and chronic morbidities.  Suggested second opinion from orthopedic at tertiary care center in [place].    Interval Followup:    still complaining of significant left knee more than hip pain albeit improving.  Patient wants to work with physical therapy so he can go to his apartment.  His goal is to walk at least 5 steps by himself.    Objective   Objective     Vitals:   Temp:  [97.3 °F (36.3 °C)-98.4 °F (36.9 °C)] 98.1 °F (36.7 °C)  Heart Rate:  [68-85] 85  Resp:  [18-20] 18  BP: (107-123)/(45-77) 109/58    Physical Exam   GEN: No acute distress  HEENT: Moist mucous membranes  LUNGS: Equal chest rise bilaterally  CARDIAC: Regular rate and rhythm  NEURO: Moving all 4 extremities spontaneously  SKIN: No obvious breakdown.  S/p right transmetatarsal amputation well-healed.    Result Review    Result Review:  I have personally reviewed these results:  [x]   Laboratory      Lab 09/28/23  0647 09/27/23  0449   WBC 5.77 6.02   HEMOGLOBIN 12.0* 12.2*   HEMATOCRIT 39.1 38.9   PLATELETS 97* 104*   NEUTROS ABS 3.61 3.95   IMMATURE GRANS (ABS) 0.01 0.02   LYMPHS ABS 1.39 1.34   MONOS ABS 0.63 0.57   EOS ABS 0.10 0.10   MCV 81.6 80.5           Lab 09/28/23  0647 09/27/23  0449   SODIUM 139 136   POTASSIUM 4.0 3.9   CHLORIDE 103 104   CO2 28.3 24.1   ANION GAP 7.7 7.9   BUN 11 14   CREATININE 0.51* 0.50*   EGFR 113.2 113.9   GLUCOSE 133* 197*   CALCIUM 9.0 9.0   MAGNESIUM 1.9 1.9   PHOSPHORUS 3.8 3.6           Lab 09/28/23  0647 09/27/23 0449   TOTAL PROTEIN 6.5 6.4   ALBUMIN 3.3* 3.4*   GLOBULIN 3.2 3.0   ALT (SGPT) 54* 50*   AST (SGOT) 59* 55*   BILIRUBIN 1.1 1.0   ALK PHOS 196* 198*                         Brief Urine Lab Results  (Last result in the past 365 days)        Color   Clarity   Blood   Leuk Est   Nitrite   Protein   CREAT   Urine HCG        09/11/23 2200 Dark Yellow   Clear   Negative   Negative   Negative   Negative                 [x]  Microbiology   Microbiology Results (last 10 days)       ** No results found for the last 240 hours. **          [x]  Radiology  No radiology results for the last 7 days  []  EKG/Telemetry   []  Cardiology/Vascular   []  Pathology  []  Old records  []  Other:    Assessment & Plan   Assessment / Plan     Assessment:  Generalized weakness  Diabetes  Low sodium likely a lab error.  Resolved  Hypertension  History of atrial fibrillation rate controlled on Eliquis  Morbid obesity.  BMI 42.8  Debility with wheelchair dependent  Severe DJD of lower extremities  Chronic wound  Mild transaminitis.  Stable  S/p right transmetatarsal amputation    Plan:    Continue PT/OT  Continue current medications  Continue as needed Lomotil  Continue wound care as needed  Status post IR guided hip injection and knee injection by orthopedic surgery  Vitals reviewed  Continue increased dose of Lyrica for more neuropathic pain control.  Norco and tramadol  as needed.  Orthopedic surgery consulted, patient status post knee injection.  Wants left knee replacement.  As per Ortho not a candidate for left total knee arthroplasty because of morbid obesity, diabetes and other comorbidities.  Patient can have a second opinion from orthopedic surgery at tertiary care center.  Wants double portions for breakfast  Bowel regimen  Started on Jardiance and lisinopril  Sliding-scale and basal insulin.  Continue increased dose of Levemir for better glucose control, tolerating  Awaiting rehab arrangements.    Reviewed patients labs and imaging, and discussed with patient and nurse at bedside.    DVT prophylaxis:  Medical DVT prophylaxis orders are present.    CODE STATUS:       Electronically signed by Shekhar Au MD, 10/02/23, 6:56 PM EDT.

## 2023-10-02 NOTE — PLAN OF CARE
Goal Outcome Evaluation:  Plan of Care Reviewed With: patient        Progress: no change  Outcome Evaluation: patient is alert and oriented x4 and on room air. patient medicated for pain per mar. skin care provided per orders. no new issues or concerns at this time.

## 2023-10-02 NOTE — PLAN OF CARE
Goal Outcome Evaluation:  Plan of Care Reviewed With: patient        Progress: no change  Outcome Evaluation: Patient is alert and oriented and speaking with RN throughout shift, patient has been medicated for pain with PRN Norco x2 and PRN Baclofen X1. Patient wound care provided per orders, patient shows no signs or symptoms of distress at this time and none are reported to RN. Patient has call light within reach and is educated and aware of how to use at this time.

## 2023-10-02 NOTE — PROGRESS NOTES
I was asked to see the patient again regarding joint replacement discussion.  We again discussed his medical comorbidities including morbid obesity and poorly controlled diabetes.  His diabetes has resulted in a right transmetatarsal amputation.  Given the severity of his medical comorbidities and his poor functional capacity at baseline we discussed that he is not an operative candidate at this time, but may be in the future.  This is consistent with my partner's note from 3/17/2023 and my initial consultation note.  We discussed medical optimization including weight loss and improved diabetic control prior to proceeding with surgical management.  Recommend continuing PT/OT.  He may use an assistive device as needed.  He may have intra-articular injections every 3 months as needed.  We discussed that he may consider outpatient evaluation at a tertiary center such as [facility] for a second opinion.  No additional orthopedic interventions planned.      Electronically signed by Rkii Davis MD, 10/02/23, 12:28 PM EDT.

## 2023-10-03 LAB
GLUCOSE BLDC GLUCOMTR-MCNC: 223 MG/DL (ref 70–99)
GLUCOSE BLDC GLUCOMTR-MCNC: 235 MG/DL (ref 70–99)
GLUCOSE BLDC GLUCOMTR-MCNC: 266 MG/DL (ref 70–99)

## 2023-10-03 PROCEDURE — 63710000001 INSULIN DETEMIR PER 5 UNITS: Performed by: INTERNAL MEDICINE

## 2023-10-03 PROCEDURE — 99232 SBSQ HOSP IP/OBS MODERATE 35: CPT | Performed by: INTERNAL MEDICINE

## 2023-10-03 PROCEDURE — 82948 REAGENT STRIP/BLOOD GLUCOSE: CPT

## 2023-10-03 PROCEDURE — 97168 OT RE-EVAL EST PLAN CARE: CPT

## 2023-10-03 PROCEDURE — 63710000001 INSULIN LISPRO (HUMAN) PER 5 UNITS: Performed by: FAMILY MEDICINE

## 2023-10-03 PROCEDURE — 97110 THERAPEUTIC EXERCISES: CPT

## 2023-10-03 RX ADMIN — APIXABAN 5 MG: 5 TABLET, FILM COATED ORAL at 20:43

## 2023-10-03 RX ADMIN — Medication: at 10:24

## 2023-10-03 RX ADMIN — ATORVASTATIN CALCIUM 10 MG: 10 TABLET, FILM COATED ORAL at 20:43

## 2023-10-03 RX ADMIN — CYANOCOBALAMIN TAB 500 MCG 1000 MCG: 500 TAB at 09:02

## 2023-10-03 RX ADMIN — LISINOPRIL 2.5 MG: 2.5 TABLET ORAL at 09:02

## 2023-10-03 RX ADMIN — EMPAGLIFLOZIN 10 MG: 10 TABLET, FILM COATED ORAL at 09:02

## 2023-10-03 RX ADMIN — BACLOFEN 10 MG: 10 TABLET ORAL at 06:48

## 2023-10-03 RX ADMIN — INSULIN LISPRO 6 UNITS: 100 INJECTION, SOLUTION INTRAVENOUS; SUBCUTANEOUS at 17:58

## 2023-10-03 RX ADMIN — PREGABALIN 50 MG: 25 CAPSULE ORAL at 09:02

## 2023-10-03 RX ADMIN — PREGABALIN 50 MG: 25 CAPSULE ORAL at 17:58

## 2023-10-03 RX ADMIN — MICONAZOLE NITRATE 1 APPLICATION: 2 POWDER TOPICAL at 20:54

## 2023-10-03 RX ADMIN — INSULIN LISPRO 4 UNITS: 100 INJECTION, SOLUTION INTRAVENOUS; SUBCUTANEOUS at 09:03

## 2023-10-03 RX ADMIN — HYDROCODONE BITARTRATE AND ACETAMINOPHEN 1 TABLET: 7.5; 325 TABLET ORAL at 06:46

## 2023-10-03 RX ADMIN — MICONAZOLE NITRATE 1 APPLICATION: 2 POWDER TOPICAL at 09:04

## 2023-10-03 RX ADMIN — HYDROCODONE BITARTRATE AND ACETAMINOPHEN 1 TABLET: 7.5; 325 TABLET ORAL at 22:29

## 2023-10-03 RX ADMIN — APIXABAN 5 MG: 5 TABLET, FILM COATED ORAL at 09:02

## 2023-10-03 RX ADMIN — PREGABALIN 50 MG: 25 CAPSULE ORAL at 20:43

## 2023-10-03 RX ADMIN — HYDROCODONE BITARTRATE AND ACETAMINOPHEN 1 TABLET: 7.5; 325 TABLET ORAL at 16:04

## 2023-10-03 RX ADMIN — FAMOTIDINE 40 MG: 20 TABLET, FILM COATED ORAL at 09:02

## 2023-10-03 RX ADMIN — INSULIN DETEMIR 40 UNITS: 100 INJECTION, SOLUTION SUBCUTANEOUS at 09:03

## 2023-10-03 RX ADMIN — INSULIN LISPRO 4 UNITS: 100 INJECTION, SOLUTION INTRAVENOUS; SUBCUTANEOUS at 12:42

## 2023-10-03 RX ADMIN — INSULIN LISPRO 4 UNITS: 100 INJECTION, SOLUTION INTRAVENOUS; SUBCUTANEOUS at 20:54

## 2023-10-03 RX ADMIN — BACLOFEN 10 MG: 10 TABLET ORAL at 22:29

## 2023-10-03 RX ADMIN — INSULIN DETEMIR 42 UNITS: 100 INJECTION, SOLUTION SUBCUTANEOUS at 20:46

## 2023-10-03 NOTE — PROGRESS NOTES
[facility]   Hospitalist Progress Note  Date: 10/3/2023  Patient Name: Jose Shaikh  : 1960  MRN: [id number]  Date of admission: 9/10/2023  Room/Bed: 4027/1      Subjective   Subjective     Chief Complaint: Weakness    Summary:Jose Shaikh is a 62 y.o. male with multiple medical problems [facility].  He stated that he could barely move or get out of his car so EMS was called.  He said that due to nonweightbearing restrictions he was unable to work on his lower extremity weakness at rehab and was unable to care for himself upon discharge.  Patient had hip and knee injection in hopes that this would help with his pain, he is working better with physical therapy.  He is still unable to walk at this time and  is trying to arrange rehab placement.  Patient again seen by orthopedic surgery on  on patient request to be evaluated for left knee total arthroplasty.  Per Ortho  surgery patient is not a candidate for left total knee arthroplasty because of morbid obesity, diabetes and chronic morbidities.  Suggested second opinion from orthopedic at tertiary care center in [place].    Interval Followup:    still complaining of significant left knee more than hip pain albeit improving.  Blood sugar up.  Has been having cookies from outside from a visitor.  Patient wants to work with physical therapy so he can go to his apartment.  His goal is to walk at least 5 steps by himself.    Objective   Objective     Vitals:   Temp:  [97.6 °F (36.4 °C)-99.2 °F (37.3 °C)] 98.1 °F (36.7 °C)  Heart Rate:  [76-94] 88  Resp:  [16-18] 18  BP: (106-129)/(51-64) 129/57    Physical Exam   GEN: No acute distress  HEENT: Moist mucous membranes  LUNGS: Equal chest rise bilaterally  CARDIAC: Regular rate and rhythm  NEURO: Moving all 4 extremities spontaneously  SKIN: No obvious breakdown.  S/p right transmetatarsal amputation well-healed.    Result Review     Result Review:  I have personally reviewed these results:  [x]  Laboratory      Lab 09/28/23  0647 09/27/23  0449   WBC 5.77 6.02   HEMOGLOBIN 12.0* 12.2*   HEMATOCRIT 39.1 38.9   PLATELETS 97* 104*   NEUTROS ABS 3.61 3.95   IMMATURE GRANS (ABS) 0.01 0.02   LYMPHS ABS 1.39 1.34   MONOS ABS 0.63 0.57   EOS ABS 0.10 0.10   MCV 81.6 80.5           Lab 09/28/23  0647 09/27/23  0449   SODIUM 139 136   POTASSIUM 4.0 3.9   CHLORIDE 103 104   CO2 28.3 24.1   ANION GAP 7.7 7.9   BUN 11 14   CREATININE 0.51* 0.50*   EGFR 113.2 113.9   GLUCOSE 133* 197*   CALCIUM 9.0 9.0   MAGNESIUM 1.9 1.9   PHOSPHORUS 3.8 3.6           Lab 09/28/23  0647 09/27/23  0449   TOTAL PROTEIN 6.5 6.4   ALBUMIN 3.3* 3.4*   GLOBULIN 3.2 3.0   ALT (SGPT) 54* 50*   AST (SGOT) 59* 55*   BILIRUBIN 1.1 1.0   ALK PHOS 196* 198*                         Brief Urine Lab Results  (Last result in the past 365 days)        Color   Clarity   Blood   Leuk Est   Nitrite   Protein   CREAT   Urine HCG        09/11/23 2200 Dark Yellow   Clear   Negative   Negative   Negative   Negative                 [x]  Microbiology   Microbiology Results (last 10 days)       ** No results found for the last 240 hours. **          [x]  Radiology  No radiology results for the last 7 days  []  EKG/Telemetry   []  Cardiology/Vascular   []  Pathology  []  Old records  []  Other:    Assessment & Plan   Assessment / Plan     Assessment:  Generalized weakness  Diabetes  Low sodium likely a lab error.  Resolved  Hypertension  History of atrial fibrillation rate controlled on Eliquis  Morbid obesity.  BMI 42.8  Debility with wheelchair dependent  Severe DJD of lower extremities  Chronic wound  Mild transaminitis.  Stable  S/p right transmetatarsal amputation    Plan:    Continue PT/OT  Continue current medications  Continue as needed Lomotil  Continue wound care as needed  Status post IR guided hip injection and knee injection by orthopedic surgery  Vitals reviewed  Continue increased  dose of Lyrica for more neuropathic pain control.  Norco and tramadol as needed.  Orthopedic surgery consulted, patient status post knee injection.  Wants left knee replacement.  As per Ortho not a candidate for left total knee arthroplasty because of morbid obesity, diabetes and other comorbidities.  Patient can have a second opinion from orthopedic surgery at tertiary care center.  Wants double portions for breakfast  Bowel regimen  Started on Jardiance and lisinopril  Sliding-scale and basal insulin, titrate to control blood sugar.  Continue increased dose of Levemir for better glucose control, tolerating  Awaiting rehab arrangements.    Reviewed patients labs and imaging, and discussed with patient and nurse at bedside.    DVT prophylaxis:  Medical DVT prophylaxis orders are present.    CODE STATUS:         Electronically signed by Shekhar Au MD, 10/03/23, 6:08 PM EDT.

## 2023-10-03 NOTE — THERAPY RE-EVALUATION
Patient Name: Jose Shaikh  : 1960    MRN: [id number]                              Today's Date: 10/3/2023       Admit Date: 9/10/2023    Visit Dx:     ICD-10-CM ICD-9-CM   1. Generalized weakness  R53.1 780.79   2. Hyponatremia  E87.1 276.1   3. Difficulty in walking  R26.2 719.7   4. Decreased activities of daily living (ADL)  Z78.9 V49.89   5. Difficulty walking  R26.2 719.7     Patient Active Problem List   Diagnosis    Diabetic ulcer of left heel associated with type 2 DM    Acute osteomyelitis of left calcaneus     Diabetic ulcer of left heel associated with type 2 DM    Diabetic ulcer of right midfoot associated with type 2 DM    Paroxysmal atrial fibrillation    Essential hypertension    Hyperlipidemia LDL goal <100    Cellulitis and abscess of foot    High alkaline phosphatase level    Osteomyelitis    Onychomycosis    Onychocryptosis    Foot pain, bilateral    Osteomyelitis of foot, right, acute    Cellulitis of right foot    Type 2 diabetes mellitus, with long-term current use of insulin    Class 3 severe obesity due to excess calories with serious comorbidity and body mass index (BMI) of 45.0 to 49.9 in adult    Anxiety disorder, unspecified    Claustrophobia    Dependence on wheelchair    Depression, unspecified    Long term (current) use of anticoagulants    Long term (current) use of oral hypoglycemic drugs    Wound of foot    Non-prs chronic ulcer oth prt r foot limited to brkdwn skin    Orthostatic hypotension    Other chronic osteomyelitis, right ankle and foot    Personal history of nicotine dependence    Thrombocytopenia, unspecified    Unspecified open wound, right foot, initial encounter    Diabetic foot infection    Subacute osteomyelitis of right foot    Right foot pain    Sepsis    Onychomycosis    Foot pain, left    Impaired mobility and ADLs    Absence of toe of right foot    Corns and callosity    Disability of walking    Fracture    Limb swelling    Polyneuropathy    Pressure  ulcer, stage 1    Shortness of breath    Generalized weakness     Past Medical History:   Diagnosis Date    Absence of toe of right foot     Acute osteomyelitis of left calcaneus  8/18/2021    Anxiety and depression     Arthritis     Claustrophobia     Corns and callus     Diabetic ulcer of left heel associated with type 2 DM 8/18/2021    Diabetic ulcer of left heel associated with type 2 DM 7/6/2021    Diabetic ulcer of right midfoot associated with type 2 DM 8/18/2021    Difficulty walking     Essential hypertension 8/31/2021    Hammertoe     Hyperlipidemia LDL goal <100 8/31/2021    Ingrown toenail     Obesity     Paroxysmal atrial fibrillation 8/31/2021    Polyneuropathy     Pressure ulcer, stage 1     Tinea unguium     Type 2 diabetes mellitus with polyneuropathy      Past Surgical History:   Procedure Laterality Date    CYST REMOVAL      center of back; benign    INCISION AND DRAINAGE ABSCESS      back    INCISION AND DRAINAGE LEG Right 12/10/2021    Procedure: INCISION AND DRAINAGE LOWER EXTREMITY;  Surgeon: Ash Leyva DPM;  Location: [facility] MAIN OR;  Service: Podiatry;  Laterality: Right;    OTHER SURGICAL HISTORY      Surgical clips left foot    TOE SURGERY Right     Removal of 5th toe    TRANS METATARSAL AMPUTATION Right 12/2/2021    Procedure: AMPUTATION TRANS METATARSAL;  Surgeon: Ash Leyva DPM;  Location: [facility] MAIN OR;  Service: Podiatry;  Laterality: Right;    WRIST SURGERY Left     repair of injury      General Information       Row Name 10/03/23 1109          OT Time and Intention    Document Type re-evaluation  -EG     Mode of Treatment individual therapy;occupational therapy  -EG       Row Name 10/03/23 1109          General Information    Patient Profile Reviewed --  reports ind. at home prior but has not been home since June; been at a LTC facility  -EG     Prior Level of Function independent:;ADL's  -EG     Existing Precautions/Restrictions fall  -EG     Barriers to  Rehab previous functional deficit;uncooperative  -EG       Row Name 10/03/23 1109          Living Environment    People in Home alone  -EG       Row Name 10/03/23 1109          Home Main Entrance    Number of Stairs, Main Entrance two  -EG       Row Name 10/03/23 1109          Cognition    Orientation Status (Cognition) oriented x 4  -EG       Row Name 10/03/23 1109          Safety Issues, Functional Mobility    Safety Issues Affecting Function (Mobility) awareness of need for assistance;impulsivity;safety precautions follow-through/compliance;insight into deficits/self-awareness;judgment  -EG     Impairments Affecting Function (Mobility) balance;endurance/activity tolerance;pain;strength;range of motion (ROM)  -EG               User Key  (r) = Recorded By, (t) = Taken By, (c) = Cosigned By      Initials Name Provider Type    EG Maren Heard OT Occupational Therapist                     Mobility/ADL's       Row Name 10/03/23 1110          Bed Mobility    Bed Mobility bed mobility (all) activities  -EG     All Activities, [place] (Bed Mobility) minimum assist (75% patient effort);maximum assist (25% patient effort);dependent (less than 25% patient effort)  -EG     Assistive Device (Bed Mobility) bed rails;draw sheet;overhead trapeze  -EG     Comment, (Bed Mobility) Patient max2 to scoot and reposition back to semi-fowlers position in bed at end of session  -EG       Row Name 10/03/23 1110          Transfers    Transfers sit-stand transfer;stand-sit transfer  -EG       Row Name 10/03/23 1110          Sit-Stand Transfer    Sit-Stand [place] (Transfers) maximum assist (25% patient effort)  -EG     Assistive Device (Sit-Stand Transfers) walker, front-wheeled  -EG     Comment, (Sit-Stand Transfer) performed from raised EOB 2x with mod/maxA from OT; max cues on GEOFF, hand placement etc. but patient can be noncompliant with cues  -EG       Row Name 10/03/23 1110          Stand-Sit Transfer    Stand-Sit  "[place] (Transfers) maximum assist (25% patient effort)  -EG     Assistive Device (Stand-Sit Transfers) walker, front-wheeled  -EG       Row Name 10/03/23 1110          Functional Mobility    Functional Mobility- Comment unable to perform  -EG       Row Name 10/03/23 1110          Mobility    Extremity Weight-bearing Status left lower extremity;right lower extremity  -EG     Left Lower Extremity (Weight-bearing Status) weight-bearing as tolerated (WBAT)  -EG     Right Lower Extremity (Weight-bearing Status) weight-bearing as tolerated (WBAT)  -EG               User Key  (r) = Recorded By, (t) = Taken By, (c) = Cosigned By      Initials Name Provider Type    EG Maren Heard, BEKAH Occupational Therapist                   Obj/Interventions       Row Name 10/03/23 1112          Sensory Assessment (Somatosensory)    Sensory Assessment (Somatosensory) UE sensation intact  -EG       Row Name 10/03/23 1112          Vision Assessment/Intervention    Visual Impairment/Limitations WFL;corrective lenses for reading  -EG       Row Name 10/03/23 1112          Motor Skills    Motor Skills functional endurance  -EG     Functional Endurance Poor+/fair-  -EG       Row Name 10/03/23 1112          Balance    Balance Interventions sitting;sit to stand;standing;supported;weight shifting activity  -EG     Comment, Balance EOB unsupported sitting tolerated for 8-10 minutes while \"preparing to stand\"; BUE support and assist from OT to stand at rolling walker  -EG               User Key  (r) = Recorded By, (t) = Taken By, (c) = Cosigned By      Initials Name Provider Type    EG Maren Heard, OT Occupational Therapist                   Goals/Plan       Row Name 10/03/23 1114          Transfer Goal 1 (OT)    Activity/Assistive Device (Transfer Goal 1, OT) transfers, all  -EG     [place] Level/Cues Needed (Transfer Goal 1, OT) modified independence  -EG     Time Frame (Transfer Goal 1, OT) long term goal (LTG);10 days  -EG     " Progress/Outcome (Transfer Goal 1, OT) goal ongoing;progress slower than expected  -EG       Row Name 10/03/23 1114          Bathing Goal 1 (OT)    Activity/Device (Bathing Goal 1, OT) bathing skills, all  -EG     [place] Level/Cues Needed (Bathing Goal 1, OT) modified independence  -EG     Time Frame (Bathing Goal 1, OT) long term goal (LTG);10 days  -EG     Progress/Outcomes (Bathing Goal 1, OT) goal ongoing;progress slower than expected  -EG       Row Name 10/03/23 1114          Dressing Goal 1 (OT)    Activity/Device (Dressing Goal 1, OT) dressing skills, all  -EG     [place]/Cues Needed (Dressing Goal 1, OT) modified independence  -EG     Time Frame (Dressing Goal 1, OT) long term goal (LTG);10 days  -EG     Progress/Outcome (Dressing Goal 1, OT) goal ongoing;progress slower than expected  -EG       Row Name 10/03/23 1114          Toileting Goal 1 (OT)    Activity/Device (Toileting Goal 1, OT) toileting skills, all;raised toilet seat  -EG     [place] Level/Cues Needed (Toileting Goal 1, OT) modified independence  -EG     Time Frame (Toileting Goal 1, OT) long term goal (LTG);10 days  -EG     Progress/Outcome (Toileting Goal 1, OT) goal ongoing;progress slower than expected  -EG       Row Name 10/03/23 1114          Grooming Goal 1 (OT)    Activity/Device (Grooming Goal 1, OT) grooming skills, all  -EG     [place] (Grooming Goal 1, OT) modified independence  -EG     Time Frame (Grooming Goal 1, OT) long term goal (LTG);10 days  -EG     Progress/Outcome (Grooming Goal 1, OT) goal ongoing;progress slower than expected  -EG       Row Name 10/03/23 1114          Problem Specific Goal 1 (OT)    Problem Specific Goal 1 (OT) Patient will demonstrate fair plus endurance/activity tolerance needed to support ADLs.  -EG     Time Frame (Problem Specific Goal 1, OT) long term goal (LTG);10 days  -EG     Progress/Outcome (Problem Specific Goal 1, OT) goal ongoing;progress slower than expected  -EG                User Key  (r) = Recorded By, (t) = Taken By, (c) = Cosigned By      Initials Name Provider Type    EG Maren Heard, OT Occupational Therapist                   Clinical Impression       Row Name 10/03/23 1113          Pain Assessment    Pretreatment Pain Rating 3/10  -EG     Posttreatment Pain Rating 8/10  -EG     Pain Location - Side/Orientation Left  -EG     Pain Location - hip;knee  -EG       Row Name 10/03/23 1113          Plan of Care Review    Plan of Care Reviewed With patient  -EG     Progress no change  -EG     Outcome Evaluation Patient continues to demonstrate high need for skilled OT services; can display self limiting behaviors and motivaiton at times; Requires OT services to continue to facilitate OOB tolerance, improved strength, balance etc.; OT will continue with recert POC.  -EG       Row Name 10/03/23 1113          Therapy Assessment/Plan (OT)    Rehab Potential (OT) fair, will monitor progress closely  -EG     Criteria for Skilled Therapeutic Interventions Met (OT) yes;meets criteria;skilled treatment is necessary  -EG     Therapy Frequency (OT) 5 times/wk  -EG       Row Name 10/03/23 1113          Therapy Plan Review/Discharge Plan (OT)    Anticipated Discharge Disposition (OT) inpatient rehabilitation facility;sub acute care setting  -EG               User Key  (r) = Recorded By, (t) = Taken By, (c) = Cosigned By      Initials Name Provider Type    EG Maren Heard OT Occupational Therapist                   Outcome Measures       Row Name 10/03/23 1115          How much help from another is currently needed...    Putting on and taking off regular lower body clothing? 1  -EG     Bathing (including washing, rinsing, and drying) 2  -EG     Toileting (which includes using toilet bed pan or urinal) 2  -EG     Putting on and taking off regular upper body clothing 3  -EG     Taking care of personal grooming (such as brushing teeth) 3  -EG     Eating meals 4  -EG     AM-PAC 6 Clicks Score  (OT) 15  -EG       Row Name 10/03/23 1034 10/02/23 2330       How much help from another person do you currently need...    Turning from your back to your side while in flat bed without using bedrails? 3  -MP 3  -RASHARD    Moving from lying on back to sitting on the side of a flat bed without bedrails? 3  -MP 3  -RASHARD    Moving to and from a bed to a chair (including a wheelchair)? 2  -MP 1  -RASHARD    Standing up from a chair using your arms (e.g., wheelchair, bedside chair)? 1  -MP 1  -RASHARD    Climbing 3-5 steps with a railing? 1  -MP 1  -RASHARD    To walk in hospital room? 1  -MP 1  -RASHARD    AM-PAC 6 Clicks Score (PT) 11  -MP 10  -RASHARD    Highest level of mobility 4 --> Transferred to chair/commode  -MP 4 --> Transferred to chair/commode  -RASHARD      Row Name 10/03/23 1115          Functional Assessment    Outcome Measure Options AM-PAC 6 Clicks Daily Activity (OT);Optimal Instrument  -EG       Row Name 10/03/23 1115          Optimal Instrument    Optimal Instrument Optimal - 3  -EG     Bending/Stooping 5  -EG     Standing 4  -EG     Reaching 1  -EG               User Key  (r) = Recorded By, (t) = Taken By, (c) = Cosigned By      Initials Name Provider Type    Selena Guzman, RN Registered Nurse    Kay Velarde, RN Registered Nurse    aMren Luu OT Occupational Therapist                    Occupational Therapy Education       Title: PT OT SLP Therapies (Done)       Topic: Occupational Therapy (Done)       Point: ADL training (Done)       Description:   Instruct learner(s) on proper safety adaptation and remediation techniques during self care or transfers.   Instruct in proper use of assistive devices.                  Learning Progress Summary             Patient Acceptance, E, VU,NR by RASHARD at 9/24/2023 0604    Acceptance, E, VU,DU by RF at 9/18/2023 1537    Acceptance, E, VU,DU by RF at 9/17/2023 1515    Acceptance, E, VU,DU by RF at 9/16/2023 1804    Acceptance, E, VU by AV at 9/13/2023 1059                          Point: Home exercise program (Done)       Description:   Instruct learner(s) on appropriate technique for monitoring, assisting and/or progressing therapeutic exercises/activities.                  Learning Progress Summary             Patient Acceptance, E, VU,NR by  at 9/24/2023 0604    Acceptance, E, VU,DU by RF at 9/18/2023 1537    Acceptance, E, VU,DU by RF at 9/17/2023 1515    Acceptance, E, VU,DU by RF at 9/16/2023 1804    Acceptance, E, VU by AV at 9/13/2023 1059                         Point: Precautions (Done)       Description:   Instruct learner(s) on prescribed precautions during self-care and functional transfers.                  Learning Progress Summary             Patient Acceptance, E, VU,NR by  at 9/24/2023 0604    Acceptance, E, VU,DU by  at 9/18/2023 1537    Acceptance, E, VU,DU by RF at 9/17/2023 1515    Acceptance, E, VU,DU by RF at 9/16/2023 1804    Acceptance, E, VU by AV at 9/13/2023 1059                         Point: Body mechanics (Done)       Description:   Instruct learner(s) on proper positioning and spine alignment during self-care, functional mobility activities and/or exercises.                  Learning Progress Summary             Patient Acceptance, E, VU,NR by  at 9/24/2023 0604    Acceptance, E, VU,DU by RF at 9/18/2023 1537    Acceptance, E, VU,DU by RF at 9/17/2023 1515    Acceptance, E, VU,DU by RF at 9/16/2023 1804    Acceptance, E, VU by AV at 9/13/2023 1059                                         User Key       Initials Effective Dates Name Provider Type Discipline     06/16/21 -  Selena Lindquist, RN Registered Nurse Nurse    AV 06/16/21 -  Uvaldo Christine OT Occupational Therapist OT     01/19/22 -  Karl Baker, RN Registered Nurse Nurse                  OT Recommendation and Plan  Therapy Frequency (OT): 5 times/wk  Plan of Care Review  Plan of Care Reviewed With: patient  Progress: no change  Outcome Evaluation: Patient continues to demonstrate high  need for skilled OT services; can display self limiting behaviors and motivaiton at times; Requires OT services to continue to facilitate OOB tolerance, improved strength, balance etc.; OT will continue with recert POC.     Time Calculation:         Time Calculation- OT       Row Name 10/03/23 1115             Time Calculation- OT    OT Received On 10/03/23  -EG      OT Goal Re-Cert Due Date 10/12/23  -EG         Untimed Charges    OT Eval/Re-eval Minutes 30  -EG         Total Minutes    Untimed Charges Total Minutes 30  -EG       Total Minutes 30  -EG                User Key  (r) = Recorded By, (t) = Taken By, (c) = Cosigned By      Initials Name Provider Type    EG Maren Heard, OT Occupational Therapist                  Therapy Charges for Today       Code Description Service Date Service Provider Modifiers Qty    [id number] HC OT RE-EVAL 2 10/3/2023 Maren Heard OT GO 1                 Maren Heard OT  10/3/2023

## 2023-10-03 NOTE — PLAN OF CARE
Goal Outcome Evaluation:  Plan of Care Reviewed With: patient        Progress: no change  Outcome Evaluation: Patient remains alert and oriented x4. Medicated with PRN pain medication per MAR. Blood glucose monitored. Skin and wound care performed as ordered. No new issues at this time.

## 2023-10-03 NOTE — PLAN OF CARE
Goal Outcome Evaluation:  Plan of Care Reviewed With: patient        Progress: no change          Pt seeks out staff. Pt did rest intermittently. Pt was medicated for pain and stated effectiveness. Pt ate 100% of snacks. Pleasant affect. Pt resting.

## 2023-10-03 NOTE — THERAPY TREATMENT NOTE
Acute Care - Physical Therapy Treatment Note  KEO Dominguez     Patient Name: Jose Shaikh  : 1960  MRN: [id number]  Today's Date: 10/3/2023      Visit Dx:     ICD-10-CM ICD-9-CM   1. Generalized weakness  R53.1 780.79   2. Hyponatremia  E87.1 276.1   3. Difficulty in walking  R26.2 719.7   4. Decreased activities of daily living (ADL)  Z78.9 V49.89   5. Difficulty walking  R26.2 719.7     Patient Active Problem List   Diagnosis    Diabetic ulcer of left heel associated with type 2 DM    Acute osteomyelitis of left calcaneus     Diabetic ulcer of left heel associated with type 2 DM    Diabetic ulcer of right midfoot associated with type 2 DM    Paroxysmal atrial fibrillation    Essential hypertension    Hyperlipidemia LDL goal <100    Cellulitis and abscess of foot    High alkaline phosphatase level    Osteomyelitis    Onychomycosis    Onychocryptosis    Foot pain, bilateral    Osteomyelitis of foot, right, acute    Cellulitis of right foot    Type 2 diabetes mellitus, with long-term current use of insulin    Class 3 severe obesity due to excess calories with serious comorbidity and body mass index (BMI) of 45.0 to 49.9 in adult    Anxiety disorder, unspecified    Claustrophobia    Dependence on wheelchair    Depression, unspecified    Long term (current) use of anticoagulants    Long term (current) use of oral hypoglycemic drugs    Wound of foot    Non-prs chronic ulcer oth prt r foot limited to brkdwn skin    Orthostatic hypotension    Other chronic osteomyelitis, right ankle and foot    Personal history of nicotine dependence    Thrombocytopenia, unspecified    Unspecified open wound, right foot, initial encounter    Diabetic foot infection    Subacute osteomyelitis of right foot    Right foot pain    Sepsis    Onychomycosis    Foot pain, left    Impaired mobility and ADLs    Absence of toe of right foot    Corns and callosity    Disability of walking    Fracture    Limb swelling    Polyneuropathy     Pressure ulcer, stage 1    Shortness of breath    Generalized weakness     Past Medical History:   Diagnosis Date    Absence of toe of right foot     Acute osteomyelitis of left calcaneus  8/18/2021    Anxiety and depression     Arthritis     Claustrophobia     Corns and callus     Diabetic ulcer of left heel associated with type 2 DM 8/18/2021    Diabetic ulcer of left heel associated with type 2 DM 7/6/2021    Diabetic ulcer of right midfoot associated with type 2 DM 8/18/2021    Difficulty walking     Essential hypertension 8/31/2021    Hammertoe     Hyperlipidemia LDL goal <100 8/31/2021    Ingrown toenail     Obesity     Paroxysmal atrial fibrillation 8/31/2021    Polyneuropathy     Pressure ulcer, stage 1     Tinea unguium     Type 2 diabetes mellitus with polyneuropathy      Past Surgical History:   Procedure Laterality Date    CYST REMOVAL      center of back; benign    INCISION AND DRAINAGE ABSCESS      back    INCISION AND DRAINAGE LEG Right 12/10/2021    Procedure: INCISION AND DRAINAGE LOWER EXTREMITY;  Surgeon: Ash Leyva DPM;  Location: [facility];  Service: Podiatry;  Laterality: Right;    OTHER SURGICAL HISTORY      Surgical clips left foot    TOE SURGERY Right     Removal of 5th toe    TRANS METATARSAL AMPUTATION Right 12/2/2021    Procedure: AMPUTATION TRANS METATARSAL;  Surgeon: Ash Leyva DPM;  Location: [facility] OR;  Service: Podiatry;  Laterality: Right;    WRIST SURGERY Left     repair of injury     PT Assessment (last 12 hours)       PT Evaluation and Treatment       Row Name 10/03/23 1500          Physical Therapy Time and Intention    Subjective Information complains of;pain (P)   -RH     Document Type therapy note (daily note) (P)   -RH     Mode of Treatment individual therapy;physical therapy (P)   -RH     Patient Effort good (P)   -RH     Symptoms Noted During/After Treatment increased pain;fatigue (P)   -RH       Row Name 10/03/23 4001          General  Information    Patient Profile Reviewed yes (P)   -     Patient Observations alert;cooperative;agree to therapy (P)   -       Row Name 10/03/23 1500          Pain Scale: FACES Pre/Post-Treatment    Posttreatment Pain Rating 8-->hurts whole lot (P)   -     Pain Location - Side/Orientation Left (P)   -     Pain Location - hip;knee (P)   -       Row Name 10/03/23 1500          Bed Mobility    Bed Mobility other (see comments) (P)   Patient declined all bed mobility due to pain.  -       Row Name 10/03/23 1500          Transfers    Transfers other (see comments) (P)   Patient declined all transfers due to pain.  -       Row Name 10/03/23 1500          Gait/Stairs (Locomotion)    Patient was able to Ambulate no, other medical factors prevent ambulation (P)   -     Reason Patient was unable to Ambulate Uncontrolled Pain (P)   -       Row Name 10/03/23 1500          Motor Skills    Therapeutic Exercise hip;knee;ankle;other (see comments) (P)   Ankle pumps, quad sets, glute sets, heel slides, straight leg raises, hip abduction/adduction. 2 sets of 10 reps performed for all exercises from a supine position.  -       Row Name             Wound 09/10/23 2345 Right anterior hip MASD (Moisture associated skin damage)    Wound - Properties Group Placement Date: 09/10/23  -BL Placement Time: 2345 -BL Present on Hospital Admission: Y  -BL Side: Right  -BL Orientation: anterior  -BL Location: hip  -BL Primary Wound Type: MASD  -BL    Retired Wound - Properties Group Placement Date: 09/10/23  -BL Placement Time: 2345 -BL Present on Hospital Admission: Y  -BL Side: Right  -BL Orientation: anterior  -BL Location: hip  -BL Primary Wound Type: MASD  -BL    Retired Wound - Properties Group Date first assessed: 09/10/23  -BL Time first assessed: 2345 -BL Present on Hospital Admission: Y  -BL Side: Right  -BL Location: hip  -BL Primary Wound Type: MASD  -BL      Row Name             Wound 12/02/21 Right anterior foot  Incision    Wound - Properties Group Placement Date: 12/02/21  -ES Side: Right  -ES Orientation: anterior  -ES Location: foot  -ES Primary Wound Type: Incision  -ES    Retired Wound - Properties Group Placement Date: 12/02/21  -ES Side: Right  -ES Orientation: anterior  -ES Location: foot  -ES Primary Wound Type: Incision  -ES    Retired Wound - Properties Group Date first assessed: 12/02/21  -ES Side: Right  -ES Location: foot  -ES Primary Wound Type: Incision  -ES      Row Name             Wound 09/15/23 Right gluteal    Wound - Properties Group Placement Date: 09/15/23  -NH Side: Right  -NH Location: gluteal  -NH    Retired Wound - Properties Group Placement Date: 09/15/23  -NH Side: Right  -NH Location: gluteal  -NH    Retired Wound - Properties Group Date first assessed: 09/15/23  -NH Side: Right  -NH Location: gluteal  -NH      Row Name 10/03/23 1500          Progress Summary (PT)    Progress Toward Functional Goals (PT) progress toward functional goals is fair (P)   -RH     Daily Progress Summary (PT) Patient declined all transfers and ambulation today due to left hip and knee pain. He stated that his pain medications are not working anymore. He was able to tolerate supine therapeutic exercise today with complaints of minimal increases in pain. (P)   -RH               User Key  (r) = Recorded By, (t) = Taken By, (c) = Cosigned By      Initials Name Provider Type    Sarah Bernstein, RN Registered Nurse    NH Dottei Guevara RN Registered Nurse    Katlyn Garcia RN Registered Nurse     Gamal Simmons, PT Student PT Student                    Physical Therapy Education       Title: PT OT SLP Therapies (Done)       Topic: Physical Therapy (Done)       Point: Mobility training (Done)       Learning Progress Summary             Patient Acceptance, E, VU,NR by RASHARD at 9/24/2023 0604    Acceptance, E, VU,DU by  at 9/18/2023 1537    Acceptance, E, VU,DU by  at 9/17/2023 1515    Acceptance, E,  VU,DU by RF at 9/16/2023 1804    Acceptance, E, VU by AV at 9/13/2023 1059    Acceptance, E,TB, VU by  at 9/12/2023 1308                         Point: Home exercise program (Done)       Learning Progress Summary             Patient Acceptance, E, VU,NR by  at 9/24/2023 0604    Acceptance, E, VU,DU by RF at 9/18/2023 1537    Acceptance, E, VU,DU by RF at 9/17/2023 1515    Acceptance, E, VU,DU by RF at 9/16/2023 1804    Acceptance, E, VU by AV at 9/13/2023 1059    Acceptance, E,TB, VU by  at 9/12/2023 1308                         Point: Body mechanics (Done)       Learning Progress Summary             Patient Acceptance, E, VU,NR by  at 9/24/2023 0604    Acceptance, E, VU,DU by  at 9/18/2023 1537    Acceptance, E, VU,DU by  at 9/17/2023 1515    Acceptance, E, VU,DU by RF at 9/16/2023 1804    Acceptance, E, VU by AV at 9/13/2023 1059    Acceptance, E,TB, VU by  at 9/12/2023 1308                         Point: Precautions (Done)       Learning Progress Summary             Patient Acceptance, E, VU,NR by  at 9/24/2023 0604    Acceptance, E, VU,DU by  at 9/18/2023 1537    Acceptance, E, VU,DU by  at 9/17/2023 1515    Acceptance, E, VU,DU by  at 9/16/2023 1804    Acceptance, E, VU by AV at 9/13/2023 1059    Acceptance, E,TB, VU by  at 9/12/2023 1308                                         User Key       Initials Effective Dates Name Provider Type Discipline     06/16/21 -  Selena Lindquist, RONNIE Registered Nurse Nurse     06/16/21 -  Uvaldo Christine OT Occupational Therapist OT     01/19/22 -  Karl Baker, RN Registered Nurse Nurse     08/16/23 -  Gamal Simmons, MERLIN Student PT Student PT                  PT Recommendation and Plan  Anticipated Discharge Disposition (PT): inpatient rehabilitation facility  Planned Therapy Interventions (PT): balance training, bed mobility training, gait training, home exercise program, neuromuscular re-education, motor coordination training, ROM (range  of motion), stair training, strengthening, stretching, transfer training  Therapy Frequency (PT): daily  Progress Summary (PT)  Progress Toward Functional Goals (PT): (P) progress toward functional goals is fair  Daily Progress Summary (PT): (P) Patient declined all transfers and ambulation today due to left hip and knee pain. He stated that his pain medications are not working anymore. He was able to tolerate supine therapeutic exercise today with complaints of minimal increases in pain.  Plan of Care Reviewed With: patient  Outcome Evaluation: Patient presents with severe bilateral LE strength deficits and endurance impairments that have affected his functional mobility status below his baseline. He reports that he has been NWB for weeks but was able to get around independently with an assistive device prior to that. Skilled physical therapy is required to address his deficits.   Outcome Measures       Row Name 10/03/23 1500 10/01/23 1141          How much help from another person do you currently need...    Turning from your back to your side while in flat bed without using bedrails? 3 (P)   - 3  -DK     Moving from lying on back to sitting on the side of a flat bed without bedrails? 3 (P)   - 3  -DK     Moving to and from a bed to a chair (including a wheelchair)? 2 (P)   - 1  -DK     Standing up from a chair using your arms (e.g., wheelchair, bedside chair)? 1 (P)   - 2  -DK     Climbing 3-5 steps with a railing? 1 (P)   - 1  -DK     To walk in hospital room? 1 (P)   - 1  -DK     AM-PAC 6 Clicks Score (PT) 11 (P)   - 11  -DK        Functional Assessment    Outcome Measure Options -- AM-PAC 6 Clicks Basic Mobility (PT)  -DK               User Key  (r) = Recorded By, (t) = Taken By, (c) = Cosigned By      Initials Name Provider Type    Mckenna Landaverde, PTA Physical Therapist Assistant    Gamal Calero, PT Student PT Student                     Time Calculation:    PT Charges       Mainor  Name 10/03/23 1506             Time Calculation    PT Received On 10/03/23 (P)   -RH         Timed Charges    69274 - PT Therapeutic Exercise Minutes 15 (P)   -RH         Total Minutes    Timed Charges Total Minutes 15 (P)   -RH       Total Minutes 15 (P)   -RH                User Key  (r) = Recorded By, (t) = Taken By, (c) = Cosigned By      Initials Name Provider Type     Gamal Simmons, PT Student PT Student                      PT G-Codes  Outcome Measure Options: AM-PAC 6 Clicks Daily Activity (OT), Optimal Instrument  AM-PAC 6 Clicks Score (PT): (P) 11  AM-PAC 6 Clicks Score (OT): 15    Gamal Simmons, PT Student  10/3/2023

## 2023-10-04 LAB
GLUCOSE BLDC GLUCOMTR-MCNC: 164 MG/DL (ref 70–99)
GLUCOSE BLDC GLUCOMTR-MCNC: 190 MG/DL (ref 70–99)
GLUCOSE BLDC GLUCOMTR-MCNC: 201 MG/DL (ref 70–99)
GLUCOSE BLDC GLUCOMTR-MCNC: 209 MG/DL (ref 70–99)
GLUCOSE BLDC GLUCOMTR-MCNC: 277 MG/DL (ref 70–99)

## 2023-10-04 PROCEDURE — 63710000001 INSULIN DETEMIR PER 5 UNITS: Performed by: INTERNAL MEDICINE

## 2023-10-04 PROCEDURE — 99232 SBSQ HOSP IP/OBS MODERATE 35: CPT | Performed by: INTERNAL MEDICINE

## 2023-10-04 PROCEDURE — 97110 THERAPEUTIC EXERCISES: CPT

## 2023-10-04 PROCEDURE — 82948 REAGENT STRIP/BLOOD GLUCOSE: CPT

## 2023-10-04 PROCEDURE — 63710000001 INSULIN LISPRO (HUMAN) PER 5 UNITS: Performed by: FAMILY MEDICINE

## 2023-10-04 RX ADMIN — INSULIN LISPRO 4 UNITS: 100 INJECTION, SOLUTION INTRAVENOUS; SUBCUTANEOUS at 12:17

## 2023-10-04 RX ADMIN — HYDROCODONE BITARTRATE AND ACETAMINOPHEN 1 TABLET: 7.5; 325 TABLET ORAL at 04:22

## 2023-10-04 RX ADMIN — PREGABALIN 50 MG: 25 CAPSULE ORAL at 22:21

## 2023-10-04 RX ADMIN — INSULIN LISPRO 2 UNITS: 100 INJECTION, SOLUTION INTRAVENOUS; SUBCUTANEOUS at 17:00

## 2023-10-04 RX ADMIN — INSULIN DETEMIR 42 UNITS: 100 INJECTION, SOLUTION SUBCUTANEOUS at 22:21

## 2023-10-04 RX ADMIN — ATORVASTATIN CALCIUM 10 MG: 10 TABLET, FILM COATED ORAL at 22:21

## 2023-10-04 RX ADMIN — Medication 10 ML: at 09:42

## 2023-10-04 RX ADMIN — INSULIN LISPRO 6 UNITS: 100 INJECTION, SOLUTION INTRAVENOUS; SUBCUTANEOUS at 09:41

## 2023-10-04 RX ADMIN — PREGABALIN 50 MG: 25 CAPSULE ORAL at 17:00

## 2023-10-04 RX ADMIN — APIXABAN 5 MG: 5 TABLET, FILM COATED ORAL at 09:42

## 2023-10-04 RX ADMIN — BACLOFEN 10 MG: 10 TABLET ORAL at 18:20

## 2023-10-04 RX ADMIN — TRAMADOL HYDROCHLORIDE 50 MG: 50 TABLET ORAL at 12:17

## 2023-10-04 RX ADMIN — HYDROCODONE BITARTRATE AND ACETAMINOPHEN 1 TABLET: 7.5; 325 TABLET ORAL at 10:46

## 2023-10-04 RX ADMIN — EMPAGLIFLOZIN 10 MG: 10 TABLET, FILM COATED ORAL at 09:42

## 2023-10-04 RX ADMIN — APIXABAN 5 MG: 5 TABLET, FILM COATED ORAL at 22:21

## 2023-10-04 RX ADMIN — MICONAZOLE NITRATE 1 APPLICATION: 2 POWDER TOPICAL at 09:43

## 2023-10-04 RX ADMIN — Medication: at 09:43

## 2023-10-04 RX ADMIN — HYDROCODONE BITARTRATE AND ACETAMINOPHEN 1 TABLET: 7.5; 325 TABLET ORAL at 17:00

## 2023-10-04 RX ADMIN — PREGABALIN 50 MG: 25 CAPSULE ORAL at 09:42

## 2023-10-04 RX ADMIN — TRAMADOL HYDROCHLORIDE 50 MG: 50 TABLET ORAL at 22:30

## 2023-10-04 RX ADMIN — MICONAZOLE NITRATE 1 APPLICATION: 2 POWDER TOPICAL at 21:00

## 2023-10-04 RX ADMIN — DIPHENOXYLATE HYDROCHLORIDE AND ATROPINE SULFATE 1 TABLET: 2.5; .025 TABLET ORAL at 22:21

## 2023-10-04 RX ADMIN — INSULIN DETEMIR 42 UNITS: 100 INJECTION, SOLUTION SUBCUTANEOUS at 09:41

## 2023-10-04 RX ADMIN — FAMOTIDINE 40 MG: 20 TABLET, FILM COATED ORAL at 09:42

## 2023-10-04 RX ADMIN — CYANOCOBALAMIN TAB 500 MCG 1000 MCG: 500 TAB at 09:42

## 2023-10-04 RX ADMIN — LISINOPRIL 2.5 MG: 2.5 TABLET ORAL at 09:42

## 2023-10-04 RX ADMIN — INSULIN LISPRO 2 UNITS: 100 INJECTION, SOLUTION INTRAVENOUS; SUBCUTANEOUS at 22:30

## 2023-10-04 RX ADMIN — BACLOFEN 10 MG: 10 TABLET ORAL at 04:22

## 2023-10-04 NOTE — PLAN OF CARE
Goal Outcome Evaluation:  Plan of Care Reviewed With: patient        Progress: no change  Outcome Evaluation: Patient remains alert and oriented x4. Medicated with PRN pain medication per MAR. Blood glucose monitored. Wound care performed as ordered. No new issues at this time.

## 2023-10-04 NOTE — PROGRESS NOTES
[facility]   Hospitalist Progress Note  Date: 10/4/2023  Patient Name: Jose Shaikh  : 1960  MRN: [id number]  Date of admission: 9/10/2023  Room/Bed: 4027/1      Subjective   Subjective     Chief Complaint: Weakness    Summary:Jose Shaikh is a 62 y.o. male with multiple medical problems [facility].  He stated that he could barely move or get out of his car so EMS was called.  He said that due to nonweightbearing restrictions he was unable to work on his lower extremity weakness at rehab and was unable to care for himself upon discharge.  Patient had hip and knee injection in hopes that this would help with his pain, he is working better with physical therapy.  He is still unable to walk at this time and  is trying to arrange rehab placement.  Patient again seen by orthopedic surgery on  on patient request to be evaluated for left knee total arthroplasty.  Per Ortho  surgery patient is not a candidate for left total knee arthroplasty because of morbid obesity, diabetes and chronic morbidities.  Suggested second opinion from orthopedic at tertiary care center in [place].    Interval Followup:    still complaining of significant left knee more than hip pain albeit improving.  Blood sugar up.  Has been having cookies from outside from a visitor.  Patient wants to work with physical therapy so he can go to his apartment.  His goal is to walk at least 5 steps by himself.  Patient inquired about bariatric surgery while in the hospital as he has been told to lose 50 pounds to get his knee surgery by his Ortho.    Objective   Objective     Vitals:   Temp:  [97.4 °F (36.3 °C)-98.1 °F (36.7 °C)] 97.7 °F (36.5 °C)  Heart Rate:  [81-87] 84  Resp:  [18-20] 18  BP: (105-123)/(48-77) 123/62    Physical Exam   GEN: No acute distress  HEENT: Moist mucous membranes  LUNGS: Equal chest rise bilaterally  CARDIAC: Regular rate and  rhythm  NEURO: Moving all 4 extremities spontaneously  SKIN: No obvious breakdown.  S/p right transmetatarsal amputation well-healed.    Result Review    Result Review:  I have personally reviewed these results:  [x]  Laboratory      Lab 09/28/23  0647   WBC 5.77   HEMOGLOBIN 12.0*   HEMATOCRIT 39.1   PLATELETS 97*   NEUTROS ABS 3.61   IMMATURE GRANS (ABS) 0.01   LYMPHS ABS 1.39   MONOS ABS 0.63   EOS ABS 0.10   MCV 81.6           Lab 09/28/23  0647   SODIUM 139   POTASSIUM 4.0   CHLORIDE 103   CO2 28.3   ANION GAP 7.7   BUN 11   CREATININE 0.51*   EGFR 113.2   GLUCOSE 133*   CALCIUM 9.0   MAGNESIUM 1.9   PHOSPHORUS 3.8           Lab 09/28/23  0647   TOTAL PROTEIN 6.5   ALBUMIN 3.3*   GLOBULIN 3.2   ALT (SGPT) 54*   AST (SGOT) 59*   BILIRUBIN 1.1   ALK PHOS 196*                         Brief Urine Lab Results  (Last result in the past 365 days)        Color   Clarity   Blood   Leuk Est   Nitrite   Protein   CREAT   Urine HCG        09/11/23 2200 Dark Yellow   Clear   Negative   Negative   Negative   Negative                 [x]  Microbiology   Microbiology Results (last 10 days)       ** No results found for the last 240 hours. **          [x]  Radiology  No radiology results for the last 7 days  []  EKG/Telemetry   []  Cardiology/Vascular   []  Pathology  []  Old records  []  Other:    Assessment & Plan   Assessment / Plan     Assessment:  Generalized weakness  Diabetes  Low sodium likely a lab error.  Resolved  Hypertension  History of atrial fibrillation rate controlled on Eliquis  Morbid obesity.  BMI 42.8  Debility with wheelchair dependent  Severe DJD of lower extremities  Chronic wound  Mild transaminitis.  Stable  S/p right transmetatarsal amputation    Plan:    Continue PT/OT  Continue current medications  Continue as needed Lomotil  Continue wound care as needed  Status post IR guided hip injection and knee injection by orthopedic surgery  Vitals reviewed  Continue increased dose of Lyrica for more  neuropathic pain control.  Norco and tramadol as needed.  Orthopedic surgery consulted, patient status post knee injection.  Wants left knee replacement.  As per Ortho not a candidate for left total knee arthroplasty because of morbid obesity, diabetes and other comorbidities.  Patient can have a second opinion from orthopedic surgery at tertiary care center.  Wants double portions for breakfast  Bowel regimen  Started on Jardiance and lisinopril  Sliding-scale and basal insulin, titrate to control blood sugar.  Continue increased dose of Levemir for better glucose control, tolerating  Awaiting rehab arrangements.    Reviewed patients labs and imaging, and discussed with patient and nurse at bedside.    DVT prophylaxis:  Medical DVT prophylaxis orders are present.    CODE STATUS:         Electronically signed by Shekhar Au MD, 10/04/23, 6:28 PM EDT.

## 2023-10-04 NOTE — THERAPY TREATMENT NOTE
Acute Care - Physical Therapy Treatment Note  KE[facility] Dominguez     Patient Name: Jose Shaikh  : 1960  MRN: [id number]  Today's Date: 10/4/2023      Visit Dx:     ICD-10-CM ICD-9-CM   1. Generalized weakness  R53.1 780.79   2. Hyponatremia  E87.1 276.1   3. Difficulty in walking  R26.2 719.7   4. Decreased activities of daily living (ADL)  Z78.9 V49.89   5. Difficulty walking  R26.2 719.7     Patient Active Problem List   Diagnosis    Diabetic ulcer of left heel associated with type 2 DM    Acute osteomyelitis of left calcaneus     Diabetic ulcer of left heel associated with type 2 DM    Diabetic ulcer of right midfoot associated with type 2 DM    Paroxysmal atrial fibrillation    Essential hypertension    Hyperlipidemia LDL goal <100    Cellulitis and abscess of foot    High alkaline phosphatase level    Osteomyelitis    Onychomycosis    Onychocryptosis    Foot pain, bilateral    Osteomyelitis of foot, right, acute    Cellulitis of right foot    Type 2 diabetes mellitus, with long-term current use of insulin    Class 3 severe obesity due to excess calories with serious comorbidity and body mass index (BMI) of 45.0 to 49.9 in adult    Anxiety disorder, unspecified    Claustrophobia    Dependence on wheelchair    Depression, unspecified    Long term (current) use of anticoagulants    Long term (current) use of oral hypoglycemic drugs    Wound of foot    Non-prs chronic ulcer oth prt r foot limited to brkdwn skin    Orthostatic hypotension    Other chronic osteomyelitis, right ankle and foot    Personal history of nicotine dependence    Thrombocytopenia, unspecified    Unspecified open wound, right foot, initial encounter    Diabetic foot infection    Subacute osteomyelitis of right foot    Right foot pain    Sepsis    Onychomycosis    Foot pain, left    Impaired mobility and ADLs    Absence of toe of right foot    Corns and callosity    Disability of walking    Fracture    Limb swelling    Polyneuropathy     Pressure ulcer, stage 1    Shortness of breath    Generalized weakness     Past Medical History:   Diagnosis Date    Absence of toe of right foot     Acute osteomyelitis of left calcaneus  8/18/2021    Anxiety and depression     Arthritis     Claustrophobia     Corns and callus     Diabetic ulcer of left heel associated with type 2 DM 8/18/2021    Diabetic ulcer of left heel associated with type 2 DM 7/6/2021    Diabetic ulcer of right midfoot associated with type 2 DM 8/18/2021    Difficulty walking     Essential hypertension 8/31/2021    Hammertoe     Hyperlipidemia LDL goal <100 8/31/2021    Ingrown toenail     Obesity     Paroxysmal atrial fibrillation 8/31/2021    Polyneuropathy     Pressure ulcer, stage 1     Tinea unguium     Type 2 diabetes mellitus with polyneuropathy      Past Surgical History:   Procedure Laterality Date    CYST REMOVAL      center of back; benign    INCISION AND DRAINAGE ABSCESS      back    INCISION AND DRAINAGE LEG Right 12/10/2021    Procedure: INCISION AND DRAINAGE LOWER EXTREMITY;  Surgeon: Ash Leyva DPM;  Location: [facility];  Service: Podiatry;  Laterality: Right;    OTHER SURGICAL HISTORY      Surgical clips left foot    TOE SURGERY Right     Removal of 5th toe    TRANS METATARSAL AMPUTATION Right 12/2/2021    Procedure: AMPUTATION TRANS METATARSAL;  Surgeon: Ash Leyva DPM;  Location: [facility] OR;  Service: Podiatry;  Laterality: Right;    WRIST SURGERY Left     repair of injury     PT Assessment (last 12 hours)       PT Evaluation and Treatment       Row Name 10/04/23 1500          Physical Therapy Time and Intention    Subjective Information complains of;weakness;pain (P)   -ZT     Document Type therapy note (daily note) (P)   -ZT     Mode of Treatment individual therapy;physical therapy (P)   -ZT     Patient Effort good (P)   -ZT       Row Name 10/04/23 1500          General Information    Patient Profile Reviewed yes (P)   -ZT     Patient  Observations alert;cooperative;agree to therapy (P)   -ZT     Existing Precautions/Restrictions fall (P)   -       Row Name 10/04/23 1500          Bed Mobility    Bed Mobility other (see comments) (P)   Pt performed bed exercises in supine  -       Row Name 10/04/23 1500          Transfers    Transfers other (see comments) (P)   Pt declined Tfs/AMB, performed exercises in bed  -       Row Name 10/04/23 1500          Gait/Stairs (Locomotion)    Gait/Stairs Locomotion other (see comments) (P)   Pt performed bed exercises in supine, declined AMB  -       Row Name 10/04/23 1500          Safety Issues, Functional Mobility    Impairments Affecting Function (Mobility) balance;endurance/activity tolerance (P)   -       Row Name 10/04/23 1500          Balance    Balance Assessment other (see comments) (P)   Pt performed bed exercises in supine  -       Row Name 10/04/23 1500          Motor Skills    Therapeutic Exercise hip;knee;ankle (P)   -       Row Name 10/04/23 1500          Hip (Therapeutic Exercise)    Hip Strengthening (Therapeutic Exercise) aBduction;aDduction;20 repititions;other (see comments) (P)   Pt performed 20 reps of glute sets and hip adduction/abduction  -       Row Name 10/04/23 1500          Knee (Therapeutic Exercise)    Knee Strengthening (Therapeutic Exercise) SLR (straight leg raise);20 repititions;other (see comments) (P)   Pt performed 20 reps of quad sets and SLR  -       Row Name 10/04/23 1500          Ankle (Therapeutic Exercise)    Ankle Strengthening (Therapeutic Exercise) dorsiflexion;plantarflexion;20 repititions;other (see comments) (P)   Pt performed 20 reps of ankle pumps  -       Row Name             Wound 09/10/23 2345 Right anterior hip MASD (Moisture associated skin damage)    Wound - Properties Group Placement Date: 09/10/23  -BL Placement Time: 2345 -BL Present on Hospital Admission: Y  -BL Side: Right  -BL Orientation: anterior  -BL Location: hip  -BL Primary  Wound Type: MASD  -BL    Retired Wound - Properties Group Placement Date: 09/10/23  -BL Placement Time: 2345  -BL Present on Hospital Admission: Y  -BL Side: Right  -BL Orientation: anterior  -BL Location: hip  -BL Primary Wound Type: MASD  -BL    Retired Wound - Properties Group Date first assessed: 09/10/23  -BL Time first assessed: 2345 -BL Present on Hospital Admission: Y  -BL Side: Right  -BL Location: hip  -BL Primary Wound Type: MASD  -BL      Row Name             Wound 12/02/21 Right anterior foot Incision    Wound - Properties Group Placement Date: 12/02/21  -ES Side: Right  -ES Orientation: anterior  -ES Location: foot  -ES Primary Wound Type: Incision  -ES    Retired Wound - Properties Group Placement Date: 12/02/21  -ES Side: Right  -ES Orientation: anterior  -ES Location: foot  -ES Primary Wound Type: Incision  -ES    Retired Wound - Properties Group Date first assessed: 12/02/21  -ES Side: Right  -ES Location: foot  -ES Primary Wound Type: Incision  -ES      Row Name             Wound 09/15/23 Right gluteal    Wound - Properties Group Placement Date: 09/15/23  -NH Side: Right  -NH Location: gluteal  -NH    Retired Wound - Properties Group Placement Date: 09/15/23  -NH Side: Right  -NH Location: gluteal  -NH    Retired Wound - Properties Group Date first assessed: 09/15/23  -NH Side: Right  -NH Location: gluteal  -NH      Row Name 10/04/23 1500          Plan of Care Review    Plan of Care Reviewed With patient (P)   -ZT       Row Name 10/04/23 1500          Therapy Assessment/Plan (PT)    Rehab Potential (PT) good, to achieve stated therapy goals (P)   -ZT     Criteria for Skilled Interventions Met (PT) yes;skilled treatment is necessary (P)   -ZT     Therapy Frequency (PT) daily (P)   -ZT     Predicted Duration of Therapy Intervention (PT) 10 days (P)   -ZT     Problem List (PT) problems related to;balance;mobility;range of motion (ROM);strength;pain (P)   -ZT     Activity Limitations Related to  Problem List (PT) unable to ambulate safely;unable to transfer safely (P)   -ZT       Row Name 10/04/23 1500          Progress Summary (PT)    Progress Toward Functional Goals (PT) progress toward functional goals is good (P)   -ZT     Daily Progress Summary (PT) Pt presents with decreased activity tolerance and decreased endurance. He has painful hips and knees that make it difficult for him to tolerate THerEx. He was able to make it through all exercises today with a few rest breaks. He continues to require skilled PT services to improve his strength and endurance. (P)   -ZT       Row Name 10/04/23 1500          Therapy Plan Review/Discharge Plan (PT)    Therapy Plan Review (PT) evaluation/treatment results reviewed;care plan/treatment goals reviewed;participants included;patient (P)   -ZT               User Key  (r) = Recorded By, (t) = Taken By, (c) = Cosigned By      Initials Name Provider Type    Sarah Bernstein, RN Registered Nurse    Dottie Foster RN Registered Nurse    Katlyn Garcia RN Registered Nurse    Oscar Ward, PT Student PT Student                    Physical Therapy Education       Title: PT OT SLP Therapies (Done)       Topic: Physical Therapy (Done)       Point: Mobility training (Done)       Learning Progress Summary             Patient Acceptance, E, VU,NR by RASHARD at 9/24/2023 0604    Acceptance, E, VU,DU by RF at 9/18/2023 1537    Acceptance, E, VU,DU by RF at 9/17/2023 1515    Acceptance, E, VU,DU by RF at 9/16/2023 1804    Acceptance, E, VU by AV at 9/13/2023 1059    Acceptance, E,TB, VU by  at 9/12/2023 1308                         Point: Home exercise program (Done)       Learning Progress Summary             Patient Acceptance, E, VU,NR by RASHARD at 9/24/2023 0604    Acceptance, E, VU,DU by RF at 9/18/2023 1537    Acceptance, E, VU,DU by RF at 9/17/2023 1515    Acceptance, E, VU,DU by RF at 9/16/2023 1804    Acceptance, E, VU by AV at 9/13/2023 1059    Acceptance,  E,TB, VU by  at 9/12/2023 1308                         Point: Body mechanics (Done)       Learning Progress Summary             Patient Acceptance, E, VU,NR by  at 9/24/2023 0604    Acceptance, E, VU,DU by  at 9/18/2023 1537    Acceptance, E, VU,DU by  at 9/17/2023 1515    Acceptance, E, VU,DU by  at 9/16/2023 1804    Acceptance, E, VU by  at 9/13/2023 1059    Acceptance, E,TB, VU by  at 9/12/2023 1308                         Point: Precautions (Done)       Learning Progress Summary             Patient Acceptance, E, VU,NR by RASHARD at 9/24/2023 0604    Acceptance, E, VU,DU by  at 9/18/2023 1537    Acceptance, E, VU,DU by  at 9/17/2023 1515    Acceptance, E, VU,DU by  at 9/16/2023 1804    Acceptance, E, VU by  at 9/13/2023 1059    Acceptance, E,TB, VU by  at 9/12/2023 1308                                         User Key       Initials Effective Dates Name Provider Type Discipline     06/16/21 -  Selena Lindquist, RONNIE Registered Nurse Nurse     06/16/21 -  Uvaldo Christine OT Occupational Therapist OT     01/19/22 -  Karl Baker, RN Registered Nurse Nurse     08/16/23 -  Gamal Simmons PT Student PT Student PT                  PT Recommendation and Plan  Anticipated Discharge Disposition (PT): (P) inpatient rehabilitation facility  Planned Therapy Interventions (PT): (P) balance training, bed mobility training, gait training, stair training, strengthening, transfer training  Therapy Frequency (PT): (P) daily  Progress Summary (PT)  Progress Toward Functional Goals (PT): (P) progress toward functional goals is good  Daily Progress Summary (PT): (P) Pt presents with decreased activity tolerance and decreased endurance. He has painful hips and knees that make it difficult for him to tolerate THerEx. He was able to make it through all exercises today with a few rest breaks. He continues to require skilled PT services to improve his strength and endurance.  Plan of Care Reviewed With:  (P) patient   Outcome Measures       Row Name 10/04/23 1500 10/03/23 1500          How much help from another person do you currently need...    Turning from your back to your side while in flat bed without using bedrails? 3 (P)   -ZT 3  -MOE (r) RH (t) MOE (c)     Moving from lying on back to sitting on the side of a flat bed without bedrails? 3 (P)   -ZT 3  -MOE (r) RH (t) MOE (c)     Moving to and from a bed to a chair (including a wheelchair)? 2 (P)   -ZT 2  -MOE (r) RH (t) MOE (c)     Standing up from a chair using your arms (e.g., wheelchair, bedside chair)? 1 (P)   -ZT 1  -MOE (r) RH (t) MOE (c)     Climbing 3-5 steps with a railing? 1 (P)   -ZT 1  -MOE (r) RH (t) MOE (c)     To walk in hospital room? 1 (P)   -ZT 1  -MOE (r) RH (t) MOE (c)     AM-PAC 6 Clicks Score (PT) 11 (P)   -ZT 11  -MOE (r) RH (t)               User Key  (r) = Recorded By, (t) = Taken By, (c) = Cosigned By      Initials Name Provider Type    MOE Merlin Rao, PT Physical Therapist     Gamal Simmons, PT Student PT Student    ZT Oscar Shields, PT Student PT Student                     Time Calculation:    PT Charges       Row Name 10/04/23 1521             Time Calculation    PT Received On 10/04/23 (P)   -ZT      PT Goal Re-Cert Due Date 10/13/23 (P)   -ZT         Timed Charges    81789 - PT Therapeutic Exercise Minutes 15 (P)   -ZT         Total Minutes    Timed Charges Total Minutes 15 (P)   -ZT       Total Minutes 15 (P)   -ZT                User Key  (r) = Recorded By, (t) = Taken By, (c) = Cosigned By      Initials Name Provider Type    ZT Oscar Shields, PT Student PT Student                      PT G-Codes  Outcome Measure Options: AM-PAC 6 Clicks Daily Activity (OT), Optimal Instrument  AM-PAC 6 Clicks Score (PT): (P) 11  AM-PAC 6 Clicks Score (OT): 15    Oscar Shields, PT Student  10/4/2023

## 2023-10-04 NOTE — THERAPY TREATMENT NOTE
Patient Name: Jose Shaikh  : 1960    MRN: [id number]                              Today's Date: 10/4/2023       Admit Date: 9/10/2023    Visit Dx:     ICD-10-CM ICD-9-CM   1. Generalized weakness  R53.1 780.79   2. Hyponatremia  E87.1 276.1   3. Difficulty in walking  R26.2 719.7   4. Decreased activities of daily living (ADL)  Z78.9 V49.89   5. Difficulty walking  R26.2 719.7     Patient Active Problem List   Diagnosis    Diabetic ulcer of left heel associated with type 2 DM    Acute osteomyelitis of left calcaneus     Diabetic ulcer of left heel associated with type 2 DM    Diabetic ulcer of right midfoot associated with type 2 DM    Paroxysmal atrial fibrillation    Essential hypertension    Hyperlipidemia LDL goal <100    Cellulitis and abscess of foot    High alkaline phosphatase level    Osteomyelitis    Onychomycosis    Onychocryptosis    Foot pain, bilateral    Osteomyelitis of foot, right, acute    Cellulitis of right foot    Type 2 diabetes mellitus, with long-term current use of insulin    Class 3 severe obesity due to excess calories with serious comorbidity and body mass index (BMI) of 45.0 to 49.9 in adult    Anxiety disorder, unspecified    Claustrophobia    Dependence on wheelchair    Depression, unspecified    Long term (current) use of anticoagulants    Long term (current) use of oral hypoglycemic drugs    Wound of foot    Non-prs chronic ulcer oth prt r foot limited to brkdwn skin    Orthostatic hypotension    Other chronic osteomyelitis, right ankle and foot    Personal history of nicotine dependence    Thrombocytopenia, unspecified    Unspecified open wound, right foot, initial encounter    Diabetic foot infection    Subacute osteomyelitis of right foot    Right foot pain    Sepsis    Onychomycosis    Foot pain, left    Impaired mobility and ADLs    Absence of toe of right foot    Corns and callosity    Disability of walking    Fracture    Limb swelling    Polyneuropathy    Pressure  ulcer, stage 1    Shortness of breath    Generalized weakness     Past Medical History:   Diagnosis Date    Absence of toe of right foot     Acute osteomyelitis of left calcaneus  8/18/2021    Anxiety and depression     Arthritis     Claustrophobia     Corns and callus     Diabetic ulcer of left heel associated with type 2 DM 8/18/2021    Diabetic ulcer of left heel associated with type 2 DM 7/6/2021    Diabetic ulcer of right midfoot associated with type 2 DM 8/18/2021    Difficulty walking     Essential hypertension 8/31/2021    Hammertoe     Hyperlipidemia LDL goal <100 8/31/2021    Ingrown toenail     Obesity     Paroxysmal atrial fibrillation 8/31/2021    Polyneuropathy     Pressure ulcer, stage 1     Tinea unguium     Type 2 diabetes mellitus with polyneuropathy      Past Surgical History:   Procedure Laterality Date    CYST REMOVAL      center of back; benign    INCISION AND DRAINAGE ABSCESS      back    INCISION AND DRAINAGE LEG Right 12/10/2021    Procedure: INCISION AND DRAINAGE LOWER EXTREMITY;  Surgeon: Ash Leyva DPM;  Location: [facility] OR;  Service: Podiatry;  Laterality: Right;    OTHER SURGICAL HISTORY      Surgical clips left foot    TOE SURGERY Right     Removal of 5th toe    TRANS METATARSAL AMPUTATION Right 12/2/2021    Procedure: AMPUTATION TRANS METATARSAL;  Surgeon: Ash Leyva DPM;  Location: [facility] MAIN OR;  Service: Podiatry;  Laterality: Right;    WRIST SURGERY Left     repair of injury      General Information       Row Name 10/04/23 1044          OT Time and Intention    Document Type therapy note (daily note)  -AV     Mode of Treatment individual therapy;occupational therapy  -AV       Row Name 10/04/23 1044          General Information    Existing Precautions/Restrictions fall  -AV       Row Name 10/04/23 1044          Cognition    Orientation Status (Cognition) --  alert. able to perform therapeutic exercises with minimal cues/ demonstration.  -AV        "Row Name 10/04/23 1044          Safety Issues, Functional Mobility    Impairments Affecting Function (Mobility) balance;endurance/activity tolerance  -AV               User Key  (r) = Recorded By, (t) = Taken By, (c) = Cosigned By      Initials Name Provider Type    Uvaldo Hardy OT Occupational Therapist                     Mobility/ADL's    No documentation.                  Obj/Interventions       Row Name 10/04/23 1045          Shoulder (Therapeutic Exercise)    Shoulder Strengthening (Therapeutic Exercise) bilateral;horizontal aBduction/aDduction;1 lb free weight;20 repititions  -AV       Row Name 10/04/23 1045          Elbow/Forearm (Therapeutic Exercise)    Elbow/Forearm Strengthening (Therapeutic Exercise) bilateral;flexion;extension;supination;pronation;1 lb free weight;20 repititions  -AV       Row Name 10/04/23 1045          Motor Skills    Therapeutic Exercise shoulder;elbow/forearm  performed in semi-Walker's/on room air.  Minimal cues and demonstration required.  -AV               User Key  (r) = Recorded By, (t) = Taken By, (c) = Cosigned By      Initials Name Provider Type    Uvaldo Hrady OT Occupational Therapist                   Goals/Plan    No documentation.                  Clinical Impression       Row Name 10/04/23 1057          Pain Scale: FACES Pre/Post-Treatment    Pain: FACES Scale, Pretreatment 0-->no hurt  -AV     Posttreatment Pain Rating 0-->no hurt  -AV       Row Name 10/04/23 1057          Plan of Care Review    Progress no change  -AV     Outcome Evaluation Therapist met with \"nicolas Spaulding\" upon arrival upon introduction.  Patient agreeable to therapeutic exercises in bed.  Continued OT is indicated to remediate/compensate for deficits to maximize independence.  -AV       Row Name 10/04/23 1057          Vital Signs    O2 Delivery Pre Treatment room air  -AV     O2 Delivery Intra Treatment room air  -AV     O2 Delivery Post Treatment room air  -AV               User Key  (r) " = Recorded By, (t) = Taken By, (c) = Cosigned By      Initials Name Provider Type    Uvaldo Hardy OT Occupational Therapist                   Outcome Measures       Row Name 10/04/23 1058          How much help from another is currently needed...    Putting on and taking off regular lower body clothing? 1  -AV     Bathing (including washing, rinsing, and drying) 2  -AV     Toileting (which includes using toilet bed pan or urinal) 2  -AV     Putting on and taking off regular upper body clothing 3  -AV     Taking care of personal grooming (such as brushing teeth) 3  -AV     Eating meals 4  -AV     AM-PAC 6 Clicks Score (OT) 15  -AV       Row Name 10/04/23 1058          Optimal Instrument    Bending/Stooping 5  -AV     Standing 4  -AV     Reaching 1  -AV               User Key  (r) = Recorded By, (t) = Taken By, (c) = Cosigned By      Initials Name Provider Type    Uvaldo Hardy OT Occupational Therapist                    Occupational Therapy Education       Title: PT OT SLP Therapies (Done)       Topic: Occupational Therapy (Done)       Point: ADL training (Done)       Description:   Instruct learner(s) on proper safety adaptation and remediation techniques during self care or transfers.   Instruct in proper use of assistive devices.                  Learning Progress Summary             Patient Acceptance, E, VU,NR by RASHARD at 9/24/2023 0604    Acceptance, E, VU,DU by RF at 9/18/2023 1537    Acceptance, E, VU,DU by RF at 9/17/2023 1515    Acceptance, E, VU,DU by RF at 9/16/2023 1804    Acceptance, E, VU by AV at 9/13/2023 1059                         Point: Home exercise program (Done)       Description:   Instruct learner(s) on appropriate technique for monitoring, assisting and/or progressing therapeutic exercises/activities.                  Learning Progress Summary             Patient Acceptance, E, VU,NR by RASHARD at 9/24/2023 0604    Acceptance, E, VU,DU by RF at 9/18/2023 1537    Acceptance, E, VU,DU by  "RF at 9/17/2023 1515    Acceptance, E, VU,DU by RF at 9/16/2023 1804    Acceptance, E, VU by AV at 9/13/2023 1059                         Point: Precautions (Done)       Description:   Instruct learner(s) on prescribed precautions during self-care and functional transfers.                  Learning Progress Summary             Patient Acceptance, E, VU,NR by  at 9/24/2023 0604    Acceptance, E, VU,DU by RF at 9/18/2023 1537    Acceptance, E, VU,DU by RF at 9/17/2023 1515    Acceptance, E, VU,DU by RF at 9/16/2023 1804    Acceptance, E, VU by AV at 9/13/2023 1059                         Point: Body mechanics (Done)       Description:   Instruct learner(s) on proper positioning and spine alignment during self-care, functional mobility activities and/or exercises.                  Learning Progress Summary             Patient Acceptance, E, VU,NR by  at 9/24/2023 0604    Acceptance, E, VU,DU by RF at 9/18/2023 1537    Acceptance, E, VU,DU by RF at 9/17/2023 1515    Acceptance, E, VU,DU by RF at 9/16/2023 1804    Acceptance, E, VU by AV at 9/13/2023 1059                                         User Key       Initials Effective Dates Name Provider Type Discipline     06/16/21 -  Selena Lindquist, RN Registered Nurse Nurse     06/16/21 -  Uvaldo Christine OT Occupational Therapist OT     01/19/22 -  Karl Baker, RN Registered Nurse Nurse                  OT Recommendation and Plan  Planned Therapy Interventions (OT): activity tolerance training, BADL retraining, functional balance retraining, occupation/activity based interventions, patient/caregiver education/training, transfer/mobility retraining  Therapy Frequency (OT): 5 times/wk  Plan of Care Review  Plan of Care Reviewed With: patient  Progress: no change  Outcome Evaluation: Therapist met with \"nicolas Spaulding\" upon arrival upon introduction.  Patient agreeable to therapeutic exercises in bed.  Continued OT is indicated to remediate/compensate for deficits to " maximize independence.     Time Calculation:   Evaluation Complexity (OT)  Review Occupational Profile/Medical/Therapy History Complexity: expanded/moderate complexity  Assessment, Occupational Performance/Identification of Deficit Complexity: 3-5 performance deficits  Clinical Decision Making Complexity (OT): problem focused assessment/low complexity  Overall Complexity of Evaluation (OT): low complexity     Time Calculation- OT       Row Name 10/04/23 1058             Time Calculation- OT    OT Received On 10/04/23  -AV      OT Goal Re-Cert Due Date 10/12/23  -AV         Timed Charges    92266 - OT Therapeutic Exercise Minutes 10  -AV         Total Minutes    Timed Charges Total Minutes 10  -AV       Total Minutes 10  -AV                User Key  (r) = Recorded By, (t) = Taken By, (c) = Cosigned By      Initials Name Provider Type    AV Uvaldo Christine OT Occupational Therapist                  Therapy Charges for Today       Code Description Service Date Service Provider Modifiers Qty    51244763325 HC OT THER PROC EA 15 MIN 10/4/2023 Uvaldo Christine OT GO 1                 Uvaldo Christine OT  10/4/2023

## 2023-10-05 LAB
GLUCOSE BLDC GLUCOMTR-MCNC: 154 MG/DL (ref 70–99)
GLUCOSE BLDC GLUCOMTR-MCNC: 156 MG/DL (ref 70–99)
GLUCOSE BLDC GLUCOMTR-MCNC: 178 MG/DL (ref 70–99)
GLUCOSE BLDC GLUCOMTR-MCNC: 197 MG/DL (ref 70–99)

## 2023-10-05 PROCEDURE — 63710000001 INSULIN LISPRO (HUMAN) PER 5 UNITS: Performed by: INTERNAL MEDICINE

## 2023-10-05 PROCEDURE — 99232 SBSQ HOSP IP/OBS MODERATE 35: CPT | Performed by: INTERNAL MEDICINE

## 2023-10-05 PROCEDURE — 63710000001 INSULIN DETEMIR PER 5 UNITS: Performed by: INTERNAL MEDICINE

## 2023-10-05 PROCEDURE — 82948 REAGENT STRIP/BLOOD GLUCOSE: CPT

## 2023-10-05 RX ORDER — INSULIN LISPRO 100 [IU]/ML
5 INJECTION, SOLUTION INTRAVENOUS; SUBCUTANEOUS
Status: DISCONTINUED | OUTPATIENT
Start: 2023-10-05 | End: 2023-11-06 | Stop reason: HOSPADM

## 2023-10-05 RX ORDER — INSULIN LISPRO 100 [IU]/ML
4-24 INJECTION, SOLUTION INTRAVENOUS; SUBCUTANEOUS
Status: DISCONTINUED | OUTPATIENT
Start: 2023-10-05 | End: 2023-11-06 | Stop reason: HOSPADM

## 2023-10-05 RX ADMIN — Medication 10 ML: at 21:35

## 2023-10-05 RX ADMIN — ATORVASTATIN CALCIUM 10 MG: 10 TABLET, FILM COATED ORAL at 21:35

## 2023-10-05 RX ADMIN — INSULIN LISPRO 4 UNITS: 100 INJECTION, SOLUTION INTRAVENOUS; SUBCUTANEOUS at 21:36

## 2023-10-05 RX ADMIN — ACETAMINOPHEN 650 MG: 325 TABLET ORAL at 15:28

## 2023-10-05 RX ADMIN — APIXABAN 5 MG: 5 TABLET, FILM COATED ORAL at 08:50

## 2023-10-05 RX ADMIN — INSULIN LISPRO 4 UNITS: 100 INJECTION, SOLUTION INTRAVENOUS; SUBCUTANEOUS at 18:15

## 2023-10-05 RX ADMIN — MICONAZOLE NITRATE 1 APPLICATION: 2 POWDER TOPICAL at 21:36

## 2023-10-05 RX ADMIN — INSULIN LISPRO 4 UNITS: 100 INJECTION, SOLUTION INTRAVENOUS; SUBCUTANEOUS at 12:38

## 2023-10-05 RX ADMIN — INSULIN DETEMIR 45 UNITS: 100 INJECTION, SOLUTION SUBCUTANEOUS at 21:35

## 2023-10-05 RX ADMIN — HYDROCODONE BITARTRATE AND ACETAMINOPHEN 1 TABLET: 7.5; 325 TABLET ORAL at 08:50

## 2023-10-05 RX ADMIN — BACLOFEN 10 MG: 10 TABLET ORAL at 21:35

## 2023-10-05 RX ADMIN — INSULIN LISPRO 5 UNITS: 100 INJECTION, SOLUTION INTRAVENOUS; SUBCUTANEOUS at 18:15

## 2023-10-05 RX ADMIN — FAMOTIDINE 40 MG: 20 TABLET, FILM COATED ORAL at 08:50

## 2023-10-05 RX ADMIN — INSULIN LISPRO 5 UNITS: 100 INJECTION, SOLUTION INTRAVENOUS; SUBCUTANEOUS at 08:54

## 2023-10-05 RX ADMIN — APIXABAN 5 MG: 5 TABLET, FILM COATED ORAL at 21:35

## 2023-10-05 RX ADMIN — HYDROCODONE BITARTRATE AND ACETAMINOPHEN 1 TABLET: 7.5; 325 TABLET ORAL at 15:28

## 2023-10-05 RX ADMIN — PREGABALIN 50 MG: 25 CAPSULE ORAL at 08:50

## 2023-10-05 RX ADMIN — HYDROCODONE BITARTRATE AND ACETAMINOPHEN 1 TABLET: 7.5; 325 TABLET ORAL at 21:36

## 2023-10-05 RX ADMIN — EMPAGLIFLOZIN 10 MG: 10 TABLET, FILM COATED ORAL at 08:50

## 2023-10-05 RX ADMIN — LISINOPRIL 2.5 MG: 2.5 TABLET ORAL at 08:50

## 2023-10-05 RX ADMIN — BACLOFEN 10 MG: 10 TABLET ORAL at 08:50

## 2023-10-05 RX ADMIN — Medication: at 10:13

## 2023-10-05 RX ADMIN — PREGABALIN 50 MG: 25 CAPSULE ORAL at 21:35

## 2023-10-05 RX ADMIN — TRAMADOL HYDROCHLORIDE 50 MG: 50 TABLET ORAL at 10:13

## 2023-10-05 RX ADMIN — CYANOCOBALAMIN TAB 500 MCG 1000 MCG: 500 TAB at 08:50

## 2023-10-05 RX ADMIN — PREGABALIN 50 MG: 25 CAPSULE ORAL at 15:27

## 2023-10-05 RX ADMIN — MICONAZOLE NITRATE 1 APPLICATION: 2 POWDER TOPICAL at 08:56

## 2023-10-05 RX ADMIN — HYDROCODONE BITARTRATE AND ACETAMINOPHEN 1 TABLET: 7.5; 325 TABLET ORAL at 00:49

## 2023-10-05 RX ADMIN — INSULIN LISPRO 4 UNITS: 100 INJECTION, SOLUTION INTRAVENOUS; SUBCUTANEOUS at 08:57

## 2023-10-05 RX ADMIN — INSULIN LISPRO 5 UNITS: 100 INJECTION, SOLUTION INTRAVENOUS; SUBCUTANEOUS at 12:38

## 2023-10-05 RX ADMIN — INSULIN DETEMIR 45 UNITS: 100 INJECTION, SOLUTION SUBCUTANEOUS at 08:54

## 2023-10-05 NOTE — PLAN OF CARE
Goal Outcome Evaluation:  Plan of Care Reviewed With: patient        Progress: no change  Outcome Evaluation: Pt c/o pain/discomfort this shift, administered prn pain med as ordered. Skin care provided as ordered and needed. No new issues or new needs noted at this time.

## 2023-10-05 NOTE — PLAN OF CARE
Goal Outcome Evaluation:  Plan of Care Reviewed With: patient        Progress: no change  Outcome Evaluation: Patient remains alert and oriented x4. Medicated with PRN pain medication per MAR. Blood glucose monitored. Skin and wound care performed as ordered. pt has refused to be turned and repostioned this shift, pt education provided. No new issues at this time.

## 2023-10-05 NOTE — PROGRESS NOTES
[facility]   Hospitalist Progress Note  Date: 10/5/2023  Patient Name: Jose Shaikh  : 1960  MRN: [id number]  Date of admission: 9/10/2023  Consultants:   -Orthopedic Surgery: Dr. Riki Davis    Subjective   Subjective     Chief Complaint: Weakness    Summary:   Jose Shaikh is a 62 y.o. male with multiple medical problems who was discharged from [facility] the day prior to admission and stated he could barely move or get out of his car so EMS was called.  Patient had hip and knee injections in hopes that this would help with his pain and allow him to work better with physical therapy and did some a little bit but he is unable to walk at this time and  is continue to try arrange rehab placement.  During hospitalization patient was evaluated by orthopedic surgery at patient's request for left total knee arthroplasty.  Per orthopedic surgery patient is not a candidate for left total knee arthroplasty due to morbid obesity, diabetes and chronic morbidities.    Interval Followup:   No acute events overnight.  Patient states that he has been working with PT/OT.  He denies chest pain or shortness of breath.  Nursing with no additional acute issues to report.    Pain Medication:   -[place]    Review of Systems   All systems reviewed and negative unless stated otherwise under subjective.    Objective   Objective     Vitals:   Temp:  [97.7 °F (36.5 °C)-98.6 °F (37 °C)] 98.1 °F (36.7 °C)  Heart Rate:  [81-90] 88  Resp:  [18] 18  BP: (109-128)/(57-71) 113/68  Physical Exam   Gen: No acute distress, Conversant, Pleasant, sitting up in bed  Resp: CTAB, No w/r/r, No respiratory distress appreciated  Card: RRR, No m/r/g  Abd: Soft, Nontender, Nondistended, + bowel sounds    Result Review    Result Review:  I have personally reviewed the results as below and agree with these findings:  []  Laboratory:   CMP          2023    05:29 2023    04:49 2023    06:47   CMP   Glucose 246  197   133    BUN 15  14  11    Creatinine 0.54  0.50  0.51    EGFR 111.3  113.9  113.2    Sodium 138  136  139    Potassium 4.3  3.9  4.0    Chloride 103  104  103    Calcium 8.7  9.0  9.0    Total Protein 6.4  6.4  6.5    Albumin 3.2  3.4  3.3    Globulin 3.2  3.0  3.2    Total Bilirubin 0.7  1.0  1.1    Alkaline Phosphatase 189  198  196    AST (SGOT) 52  55  59    ALT (SGPT) 40  50  54    Albumin/Globulin Ratio 1.0  1.1  1.0    BUN/Creatinine Ratio 27.8  28.0  21.6    Anion Gap 10.2  7.9  7.7      CBC          9/25/2023    05:29 9/27/2023    04:49 9/28/2023    06:47   CBC   WBC 5.44  6.02  5.77    RBC 4.55  4.83  4.79    Hemoglobin 11.7  12.2  12.0    Hematocrit 37.1  38.9  39.1    MCV 81.5  80.5  81.6    MCH 25.7  25.3  25.1    MCHC 31.5  31.4  30.7    RDW 18.6  17.9  17.7    Platelets 106  104  97    Blood glucose slightly elevated.  []  Microbiology:   []  Radiology:   []  EKG/Telemetry:    []  Cardiology/Vascular:    []  Pathology:  []  Old records:  []  Other:    Assessment & Plan   Assessment / Plan     Assessment:  Generalized weakness  Type 2 diabetes mellitus  Essential hypertension  Chronic paroxysmal atrial fibrillation on Eliquis  Obesity (BMI: 42.88)  Debility with wheelchair dependence  Severe degenerative joint disease of lower extremities  Chronic wound    Plan:  -Importance of working PT/OT stressed at length the patient who voiced understanding  -Lyrica for neuropathic pain control  -Continue as needed Norco and tramadol  -Continue bowel regimen  -Continue to Jardiance and lisinopril  -No change to insulin regimen  -PT/OT consulted and following  -Will monitor electrolytes and renal function with BMP and magnesium level in the AM  -Will monitor WBC and Hgb with CBC in the AM  -Clinical course will dictate further management     DVT Prophylaxis: Apixaban  GI Prophylaxis: Famotidine  Diet: Diabetic  Dispo: PT/OT consulted.  Social work making referrals.  Code Status: Full code     Personally reviewed  patients labs and imaging, discussed with patient and nurse at bedside.      Part of this note may be an electronic transcription/translation of spoken language to printed text using the Dragon dictation system.    DVT prophylaxis:  Medical DVT prophylaxis orders are present.    CODE STATUS:   Code Status (Patient has no pulse and is not breathing): CPR (Attempt to Resuscitate)  Medical Interventions (Patient has pulse or is breathing): Full Support        Electronically signed by Joe Haynes MD, 10/05/23, 4:52 PM EDT.

## 2023-10-06 LAB
ANION GAP SERPL CALCULATED.3IONS-SCNC: 9 MMOL/L (ref 5–15)
BUN SERPL-MCNC: 18 MG/DL (ref 8–23)
BUN/CREAT SERPL: 36.7 (ref 7–25)
CALCIUM SPEC-SCNC: 8.8 MG/DL (ref 8.6–10.5)
CHLORIDE SERPL-SCNC: 102 MMOL/L (ref 98–107)
CO2 SERPL-SCNC: 22 MMOL/L (ref 22–29)
CREAT SERPL-MCNC: 0.49 MG/DL (ref 0.76–1.27)
DEPRECATED RDW RBC AUTO: 52 FL (ref 37–54)
EGFRCR SERPLBLD CKD-EPI 2021: 114.6 ML/MIN/1.73
ERYTHROCYTE [DISTWIDTH] IN BLOOD BY AUTOMATED COUNT: 17.5 % (ref 12.3–15.4)
GLUCOSE BLDC GLUCOMTR-MCNC: 117 MG/DL (ref 70–99)
GLUCOSE BLDC GLUCOMTR-MCNC: 136 MG/DL (ref 70–99)
GLUCOSE BLDC GLUCOMTR-MCNC: 161 MG/DL (ref 70–99)
GLUCOSE BLDC GLUCOMTR-MCNC: 241 MG/DL (ref 70–99)
GLUCOSE SERPL-MCNC: 153 MG/DL (ref 65–99)
HCT VFR BLD AUTO: 40.2 % (ref 37.5–51)
HGB BLD-MCNC: 12.3 G/DL (ref 13–17.7)
MAGNESIUM SERPL-MCNC: 2 MG/DL (ref 1.6–2.4)
MCH RBC QN AUTO: 25.1 PG (ref 26.6–33)
MCHC RBC AUTO-ENTMCNC: 30.6 G/DL (ref 31.5–35.7)
MCV RBC AUTO: 81.9 FL (ref 79–97)
PLATELET # BLD AUTO: 111 10*3/MM3 (ref 140–450)
PMV BLD AUTO: 12.5 FL (ref 6–12)
POTASSIUM SERPL-SCNC: 4 MMOL/L (ref 3.5–5.2)
RBC # BLD AUTO: 4.91 10*6/MM3 (ref 4.14–5.8)
SODIUM SERPL-SCNC: 133 MMOL/L (ref 136–145)
WBC NRBC COR # BLD: 7.77 10*3/MM3 (ref 3.4–10.8)

## 2023-10-06 PROCEDURE — 63710000001 INSULIN LISPRO (HUMAN) PER 5 UNITS: Performed by: INTERNAL MEDICINE

## 2023-10-06 PROCEDURE — 97110 THERAPEUTIC EXERCISES: CPT

## 2023-10-06 PROCEDURE — 82948 REAGENT STRIP/BLOOD GLUCOSE: CPT

## 2023-10-06 PROCEDURE — 85027 COMPLETE CBC AUTOMATED: CPT | Performed by: INTERNAL MEDICINE

## 2023-10-06 PROCEDURE — 83735 ASSAY OF MAGNESIUM: CPT | Performed by: INTERNAL MEDICINE

## 2023-10-06 PROCEDURE — 99232 SBSQ HOSP IP/OBS MODERATE 35: CPT | Performed by: INTERNAL MEDICINE

## 2023-10-06 PROCEDURE — 63710000001 INSULIN DETEMIR PER 5 UNITS: Performed by: INTERNAL MEDICINE

## 2023-10-06 PROCEDURE — 80048 BASIC METABOLIC PNL TOTAL CA: CPT | Performed by: INTERNAL MEDICINE

## 2023-10-06 RX ORDER — TRAMADOL HYDROCHLORIDE 50 MG/1
50 TABLET ORAL EVERY 6 HOURS PRN
Status: DISPENSED | OUTPATIENT
Start: 2023-10-06 | End: 2023-10-20

## 2023-10-06 RX ADMIN — HYDROCODONE BITARTRATE AND ACETAMINOPHEN 1 TABLET: 7.5; 325 TABLET ORAL at 04:21

## 2023-10-06 RX ADMIN — INSULIN DETEMIR 45 UNITS: 100 INJECTION, SOLUTION SUBCUTANEOUS at 08:37

## 2023-10-06 RX ADMIN — APIXABAN 5 MG: 5 TABLET, FILM COATED ORAL at 22:25

## 2023-10-06 RX ADMIN — INSULIN DETEMIR 45 UNITS: 100 INJECTION, SOLUTION SUBCUTANEOUS at 22:32

## 2023-10-06 RX ADMIN — Medication: at 09:51

## 2023-10-06 RX ADMIN — EMPAGLIFLOZIN 10 MG: 10 TABLET, FILM COATED ORAL at 08:36

## 2023-10-06 RX ADMIN — PREGABALIN 50 MG: 25 CAPSULE ORAL at 08:36

## 2023-10-06 RX ADMIN — BACLOFEN 10 MG: 10 TABLET ORAL at 21:50

## 2023-10-06 RX ADMIN — MICONAZOLE NITRATE 1 APPLICATION: 2 POWDER TOPICAL at 22:26

## 2023-10-06 RX ADMIN — CYANOCOBALAMIN TAB 500 MCG 1000 MCG: 500 TAB at 08:36

## 2023-10-06 RX ADMIN — TRAMADOL HYDROCHLORIDE 50 MG: 50 TABLET, COATED ORAL at 21:50

## 2023-10-06 RX ADMIN — TRAMADOL HYDROCHLORIDE 50 MG: 50 TABLET ORAL at 15:46

## 2023-10-06 RX ADMIN — INSULIN LISPRO 5 UNITS: 100 INJECTION, SOLUTION INTRAVENOUS; SUBCUTANEOUS at 08:36

## 2023-10-06 RX ADMIN — TRAMADOL HYDROCHLORIDE 50 MG: 50 TABLET ORAL at 09:47

## 2023-10-06 RX ADMIN — ATORVASTATIN CALCIUM 10 MG: 10 TABLET, FILM COATED ORAL at 22:25

## 2023-10-06 RX ADMIN — FAMOTIDINE 40 MG: 20 TABLET, FILM COATED ORAL at 08:36

## 2023-10-06 RX ADMIN — INSULIN LISPRO 9 UNITS: 100 INJECTION, SOLUTION INTRAVENOUS; SUBCUTANEOUS at 12:52

## 2023-10-06 RX ADMIN — PREGABALIN 50 MG: 25 CAPSULE ORAL at 15:46

## 2023-10-06 RX ADMIN — APIXABAN 5 MG: 5 TABLET, FILM COATED ORAL at 08:36

## 2023-10-06 RX ADMIN — BACLOFEN 10 MG: 10 TABLET ORAL at 09:47

## 2023-10-06 RX ADMIN — HYDROCODONE BITARTRATE AND ACETAMINOPHEN 1 TABLET: 7.5; 325 TABLET ORAL at 18:50

## 2023-10-06 RX ADMIN — LISINOPRIL 2.5 MG: 2.5 TABLET ORAL at 08:36

## 2023-10-06 RX ADMIN — MICONAZOLE NITRATE 1 APPLICATION: 2 POWDER TOPICAL at 09:50

## 2023-10-06 RX ADMIN — PREGABALIN 50 MG: 25 CAPSULE ORAL at 22:25

## 2023-10-06 RX ADMIN — INSULIN LISPRO 8 UNITS: 100 INJECTION, SOLUTION INTRAVENOUS; SUBCUTANEOUS at 22:32

## 2023-10-06 RX ADMIN — INSULIN LISPRO 5 UNITS: 100 INJECTION, SOLUTION INTRAVENOUS; SUBCUTANEOUS at 12:52

## 2023-10-06 RX ADMIN — INSULIN LISPRO 5 UNITS: 100 INJECTION, SOLUTION INTRAVENOUS; SUBCUTANEOUS at 18:50

## 2023-10-06 RX ADMIN — TRAMADOL HYDROCHLORIDE 50 MG: 50 TABLET ORAL at 03:23

## 2023-10-06 RX ADMIN — HYDROCODONE BITARTRATE AND ACETAMINOPHEN 1 TABLET: 7.5; 325 TABLET ORAL at 12:51

## 2023-10-06 NOTE — PROGRESS NOTES
[facility]   Hospitalist Progress Note  Date: 10/6/2023  Patient Name: Jose Shaikh  : 1960  MRN: [id number]  Date of admission: 9/10/2023  Consultants:   -Orthopedic Surgery: Dr. Riki Davis    Subjective   Subjective     Chief Complaint: Weakness    Summary:   Jose Shaikh is a 62 y.o. male with multiple medical problems who was discharged from [facility] the day prior to admission and stated he could barely move or get out of his car so EMS was called.  Patient had hip and knee injections in hopes that this would help with his pain and allow him to work better with physical therapy and did some a little bit but he is unable to walk at this time and  is continue to try arrange rehab placement.  During hospitalization patient was evaluated by orthopedic surgery at patient's request for left total knee arthroplasty.  Per orthopedic surgery patient is not a candidate for left total knee arthroplasty due to morbid obesity, diabetes and chronic morbidities.    Interval Followup:   No acute issues overnight.  Patient denies chest pain or shortness of breath.  Patient states that he continues to work with PT/OT services.  Social work actively working on placement.  Nursing with no additional acute issues to report    Pain Medication:   -[place]    Review of Systems   All systems reviewed and negative unless stated otherwise under subjective.    Objective   Objective     Vitals:   Temp:  [97.7 °F (36.5 °C)-98.3 °F (36.8 °C)] 97.7 °F (36.5 °C)  Heart Rate:  [83-92] 83  Resp:  [14-20] 14  BP: ()/(56-70) 128/61  Physical Exam   Gen: No acute distress, sitting up in bed, conversant  Resp: CTAB, No w/r/r, good chest rise bilaterally, normal respiratory effort  Card: RRR, No m/r/g  Abd: Soft, Nontender, Nondistended, + bowel sounds    Result Review    Result Review:  I have personally reviewed the results as below and agree with these findings:  []  Laboratory:   CMP          2023     04:49 9/28/2023    06:47 10/6/2023    06:12   CMP   Glucose 197  133  153    BUN 14  11  18    Creatinine 0.50  0.51  0.49    EGFR 113.9  113.2  114.6    Sodium 136  139  133    Potassium 3.9  4.0  4.0    Chloride 104  103  102    Calcium 9.0  9.0  8.8    Total Protein 6.4  6.5     Albumin 3.4  3.3     Globulin 3.0  3.2     Total Bilirubin 1.0  1.1     Alkaline Phosphatase 198  196     AST (SGOT) 55  59     ALT (SGPT) 50  54     Albumin/Globulin Ratio 1.1  1.0     BUN/Creatinine Ratio 28.0  21.6  36.7    Anion Gap 7.9  7.7  9.0      CBC          9/27/2023    04:49 9/28/2023    06:47 10/6/2023    06:12   CBC   WBC 6.02  5.77  7.77    RBC 4.83  4.79  4.91    Hemoglobin 12.2  12.0  12.3    Hematocrit 38.9  39.1  40.2    MCV 80.5  81.6  81.9    MCH 25.3  25.1  25.1    MCHC 31.4  30.7  30.6    RDW 17.9  17.7  17.5    Platelets 104  97  111    Magnesium within normal limits.  Blood glucose well controlled.  []  Microbiology:   []  Radiology:   []  EKG/Telemetry:    []  Cardiology/Vascular:    []  Pathology:  []  Old records:  []  Other:    Assessment & Plan   Assessment / Plan     Assessment:  Generalized weakness  Type 2 diabetes mellitus  Essential hypertension  Chronic paroxysmal atrial fibrillation on Eliquis  Obesity (BMI: 42.88)  Debility with wheelchair dependence  Severe degenerative joint disease of lower extremities  Chronic wound    Plan:  -Importance of working PT/OT stressed at length the patient who voiced understanding  -Lyrica for neuropathic pain control  -Continue as needed Norco and tramadol  -Continue bowel regimen  -Continue to Jardiance and lisinopril  -Continue current insulin regimen  -PT/OT consulted and following  -Lab holiday on 10/07/2023 unless there is an acute change in the patient's condition  -Clinical course will dictate further management     DVT Prophylaxis: Apixaban  GI Prophylaxis: Famotidine  Diet: Diabetic  Dispo: Social work actively working on rehab placement  Code Status: Full  code     Personally reviewed patients labs and imaging, discussed with patient and nurse at bedside.      Part of this note may be an electronic transcription/translation of spoken language to printed text using the Dragon dictation system.    DVT prophylaxis:  Medical DVT prophylaxis orders are present.    CODE STATUS:   Code Status (Patient has no pulse and is not breathing): CPR (Attempt to Resuscitate)  Medical Interventions (Patient has pulse or is breathing): Full Support      Electronically signed by Joe Haynes MD, 10/06/23, 11:38 AM EDT.

## 2023-10-06 NOTE — SIGNIFICANT NOTE
Wound Eval / Progress Noted    KEO Dominguez     Patient Name: Jose Shaikh  : 1960  MRN: [id number]  Today's Date: 10/6/2023                 Admit Date: 9/10/2023    Visit Dx:    ICD-10-CM ICD-9-CM   1. Generalized weakness  R53.1 780.79   2. Hyponatremia  E87.1 276.1   3. Difficulty in walking  R26.2 719.7   4. Decreased activities of daily living (ADL)  Z78.9 V49.89   5. Difficulty walking  R26.2 719.7         Generalized weakness    Diabetic ulcer of left heel associated with type 2 DM    Paroxysmal atrial fibrillation    Essential hypertension    Foot pain, bilateral    Type 2 diabetes mellitus, with long-term current use of insulin    Class 3 severe obesity due to excess calories with serious comorbidity and body mass index (BMI) of 45.0 to 49.9 in adult    Dependence on wheelchair        Past Medical History:   Diagnosis Date    Absence of toe of right foot     Acute osteomyelitis of left calcaneus  2021    Anxiety and depression     Arthritis     Claustrophobia     Corns and callus     Diabetic ulcer of left heel associated with type 2 DM 2021    Diabetic ulcer of left heel associated with type 2 DM 2021    Diabetic ulcer of right midfoot associated with type 2 DM 2021    Difficulty walking     Essential hypertension 2021    Hammertoe     Hyperlipidemia LDL goal <100 2021    Ingrown toenail     Obesity     Paroxysmal atrial fibrillation 2021    Polyneuropathy     Pressure ulcer, stage 1     Tinea unguium     Type 2 diabetes mellitus with polyneuropathy         Past Surgical History:   Procedure Laterality Date    CYST REMOVAL      center of back; benign    INCISION AND DRAINAGE ABSCESS      back    INCISION AND DRAINAGE LEG Right 12/10/2021    Procedure: INCISION AND DRAINAGE LOWER EXTREMITY;  Surgeon: Ash Leyva DPM;  Location: [facility] MAIN OR;  Service: Podiatry;  Laterality: Right;    OTHER SURGICAL HISTORY      Surgical clips left foot    TOE SURGERY  Right     Removal of 5th toe    TRANS METATARSAL AMPUTATION Right 12/2/2021    Procedure: AMPUTATION TRANS METATARSAL;  Surgeon: Ash Leyva DPM;  Location: [facility] MAIN OR;  Service: Podiatry;  Laterality: Right;    WRIST SURGERY Left     repair of injury         Physical Assessment:  Wound 12/02/21 Right anterior foot Incision (Active)   Wound Image   10/06/23 1252   Dressing Appearance dry;intact 10/06/23 1337   Closure None 10/06/23 1252   Base dry;red;scab 10/06/23 1252   Periwound dry;intact 10/06/23 1252   Periwound Temperature warm 10/06/23 1252   Periwound Skin Turgor soft 10/06/23 1252   Edges rolled/closed 10/06/23 1252   Drainage Amount none 10/06/23 1252   Care, Wound cleansed with;sterile normal saline 10/06/23 1252   Dressing Care dressing applied;petroleum-based;non-adherent;gauze;gauze, dry 10/06/23 1252   Periwound Care absorptive dressing applied 10/06/23 1252       Wound 09/10/23 2345 Right anterior hip MASD (Moisture associated skin damage) (Active)   Dressing Appearance open to air 10/06/23 1337   Closure None 10/06/23 1252   Base dry;pink 10/06/23 1252   Periwound dry;intact 10/06/23 1252   Periwound Temperature warm 10/06/23 1252   Periwound Skin Turgor soft 10/06/23 1252   Edges rolled/closed 10/06/23 1252   Drainage Amount none 10/06/23 1252   Dressing Care open to air 10/06/23 1252      Wound Check / Follow-up:  Patient seen today for wound follow-up and dressing change. Right distal foot remains with dry, crusted tissue. Cleansed with normal saline and gauze. Cleansed with normal saline and gauze. Applied non-adherent petroleum based gauze followed by dry gauze and secured with gauze roll. Recommending to continue current care. Skin folds are dry and pink. Primary RN had performed skin care with application of powder to skin folds and ammonium lactate to BLE. Recommending to continue current skin care and protection. Patient refused assessment of buttocks during visit. He  "states \"it is healed now\". Will recommend skin protection with application of barrier cream as patient will allow. Recommending to continue every two hour turns, offload heels, and keep patient free from moisture/moisture managed.        Impression: Chronic wound to right foot. Dry skin. Improving moisture within skin folds.      Short term goals: Regain skin integrity, skin protection, pressure reduction, moisture prevention/management, daily dressing changes, topical treatment, skin care.         Dottie Guevara RN    10/6/2023    16:38 EDT   "

## 2023-10-06 NOTE — THERAPY TREATMENT NOTE
Acute Care - Physical Therapy Treatment Note  KEO Dominguez     Patient Name: Jose Shaikh  : 1960  MRN: [id number]  Today's Date: 10/6/2023      Visit Dx:     ICD-10-CM ICD-9-CM   1. Generalized weakness  R53.1 780.79   2. Hyponatremia  E87.1 276.1   3. Difficulty in walking  R26.2 719.7   4. Decreased activities of daily living (ADL)  Z78.9 V49.89   5. Difficulty walking  R26.2 719.7     Patient Active Problem List   Diagnosis    Diabetic ulcer of left heel associated with type 2 DM    Acute osteomyelitis of left calcaneus     Diabetic ulcer of left heel associated with type 2 DM    Diabetic ulcer of right midfoot associated with type 2 DM    Paroxysmal atrial fibrillation    Essential hypertension    Hyperlipidemia LDL goal <100    Cellulitis and abscess of foot    High alkaline phosphatase level    Osteomyelitis    Onychomycosis    Onychocryptosis    Foot pain, bilateral    Osteomyelitis of foot, right, acute    Cellulitis of right foot    Type 2 diabetes mellitus, with long-term current use of insulin    Class 3 severe obesity due to excess calories with serious comorbidity and body mass index (BMI) of 45.0 to 49.9 in adult    Anxiety disorder, unspecified    Claustrophobia    Dependence on wheelchair    Depression, unspecified    Long term (current) use of anticoagulants    Long term (current) use of oral hypoglycemic drugs    Wound of foot    Non-prs chronic ulcer oth prt r foot limited to brkdwn skin    Orthostatic hypotension    Other chronic osteomyelitis, right ankle and foot    Personal history of nicotine dependence    Thrombocytopenia, unspecified    Unspecified open wound, right foot, initial encounter    Diabetic foot infection    Subacute osteomyelitis of right foot    Right foot pain    Sepsis    Onychomycosis    Foot pain, left    Impaired mobility and ADLs    Absence of toe of right foot    Corns and callosity    Disability of walking    Fracture    Limb swelling    Polyneuropathy     Pressure ulcer, stage 1    Shortness of breath    Generalized weakness     Past Medical History:   Diagnosis Date    Absence of toe of right foot     Acute osteomyelitis of left calcaneus  8/18/2021    Anxiety and depression     Arthritis     Claustrophobia     Corns and callus     Diabetic ulcer of left heel associated with type 2 DM 8/18/2021    Diabetic ulcer of left heel associated with type 2 DM 7/6/2021    Diabetic ulcer of right midfoot associated with type 2 DM 8/18/2021    Difficulty walking     Essential hypertension 8/31/2021    Hammertoe     Hyperlipidemia LDL goal <100 8/31/2021    Ingrown toenail     Obesity     Paroxysmal atrial fibrillation 8/31/2021    Polyneuropathy     Pressure ulcer, stage 1     Tinea unguium     Type 2 diabetes mellitus with polyneuropathy      Past Surgical History:   Procedure Laterality Date    CYST REMOVAL      center of back; benign    INCISION AND DRAINAGE ABSCESS      back    INCISION AND DRAINAGE LEG Right 12/10/2021    Procedure: INCISION AND DRAINAGE LOWER EXTREMITY;  Surgeon: Ash Leyva DPM;  Location: [facility];  Service: Podiatry;  Laterality: Right;    OTHER SURGICAL HISTORY      Surgical clips left foot    TOE SURGERY Right     Removal of 5th toe    TRANS METATARSAL AMPUTATION Right 12/2/2021    Procedure: AMPUTATION TRANS METATARSAL;  Surgeon: Ash Leyva DPM;  Location: [facility] OR;  Service: Podiatry;  Laterality: Right;    WRIST SURGERY Left     repair of injury     PT Assessment (last 12 hours)       PT Evaluation and Treatment       Row Name 10/06/23 1400          Physical Therapy Time and Intention    Subjective Information complains of;weakness;pain (P)   -ZT     Document Type therapy note (daily note) (P)   -ZT     Mode of Treatment individual therapy;physical therapy (P)   -ZT     Patient Effort good (P)   -ZT       Row Name 10/06/23 1400          General Information    Patient Profile Reviewed yes (P)   -ZT     Patient  Observations alert;cooperative;agree to therapy (P)   -     Existing Precautions/Restrictions fall (P)   -       Row Name 10/06/23 1400          Bed Mobility    Bed Mobility other (see comments) (P)   Pt performed bed exercises in supine  -       Row Name 10/06/23 1400          Transfers    Transfers other (see comments) (P)   Tfs/AMB declined, TherEx performed in bed  -       Row Name 10/06/23 1400          Gait/Stairs (Locomotion)    Gait/Stairs Locomotion other (see comments) (P)   Tfs/AMB declined, TherEx performed in bed  -       Row Name 10/06/23 1400          Safety Issues, Functional Mobility    Impairments Affecting Function (Mobility) balance;endurance/activity tolerance (P)   -       Row Name 10/06/23 1400          Balance    Balance Assessment other (see comments) (P)   Pt did not move from supine position to perform bed exercises.  -       Row Name 10/06/23 1400          Motor Skills    Therapeutic Exercise hip;knee;ankle (P)   -       Row Name 10/06/23 1400          Hip (Therapeutic Exercise)    Hip Strengthening (Therapeutic Exercise) aBduction;aDduction;20 repititions;other (see comments) (P)   Pt performed 20 reps of glute sets and hip adduction/abduction  -       Row Name 10/06/23 1400          Knee (Therapeutic Exercise)    Knee Strengthening (Therapeutic Exercise) SLR (straight leg raise);20 repititions;other (see comments) (P)   Pt performed 20 reps of quad sets and SLR  -       Row Name 10/06/23 1400          Ankle (Therapeutic Exercise)    Ankle Strengthening (Therapeutic Exercise) dorsiflexion;plantarflexion;20 repititions;other (see comments) (P)   Pt performed 20 reps of ankle pumps  -       Row Name             Wound 09/10/23 2345 Right anterior hip MASD (Moisture associated skin damage)    Wound - Properties Group Placement Date: 09/10/23  -BL Placement Time: 2345 -BL Present on Hospital Admission: Y  -BL Side: Right  -BL Orientation: anterior  -BL Location: hip   -BL Primary Wound Type: MASD  -BL    Retired Wound - Properties Group Placement Date: 09/10/23  -BL Placement Time: 2345  -BL Present on Hospital Admission: Y  -BL Side: Right  -BL Orientation: anterior  -BL Location: hip  -BL Primary Wound Type: MASD  -BL    Retired Wound - Properties Group Date first assessed: 09/10/23  -BL Time first assessed: 2345 -BL Present on Hospital Admission: Y  -BL Side: Right  -BL Location: hip  -BL Primary Wound Type: MASD  -BL      Row Name             Wound 12/02/21 Right anterior foot Incision    Wound - Properties Group Placement Date: 12/02/21  -ES Side: Right  -ES Orientation: anterior  -ES Location: foot  -ES Primary Wound Type: Incision  -ES    Retired Wound - Properties Group Placement Date: 12/02/21  -ES Side: Right  -ES Orientation: anterior  -ES Location: foot  -ES Primary Wound Type: Incision  -ES    Retired Wound - Properties Group Date first assessed: 12/02/21  -ES Side: Right  -ES Location: foot  -ES Primary Wound Type: Incision  -ES      Row Name             Wound 09/15/23 Right gluteal    Wound - Properties Group Placement Date: 09/15/23  -NH Side: Right  -NH Location: gluteal  -NH    Retired Wound - Properties Group Placement Date: 09/15/23  -NH Side: Right  -NH Location: gluteal  -NH    Retired Wound - Properties Group Date first assessed: 09/15/23  -NH Side: Right  -NH Location: gluteal  -NH      Row Name 10/06/23 1400          Plan of Care Review    Plan of Care Reviewed With patient (P)   -ZT       Row Name 10/06/23 1400          Therapy Assessment/Plan (PT)    Rehab Potential (PT) good, to achieve stated therapy goals (P)   -ZT     Criteria for Skilled Interventions Met (PT) yes;skilled treatment is necessary (P)   -ZT     Therapy Frequency (PT) daily (P)   -ZT     Predicted Duration of Therapy Intervention (PT) 10 days (P)   -ZT     Problem List (PT) problems related to;balance;mobility;range of motion (ROM);strength;pain (P)   -ZT     Activity Limitations  Related to Problem List (PT) unable to ambulate safely;unable to transfer safely (P)   -ZT       Row Name 10/06/23 1400          Progress Summary (PT)    Progress Toward Functional Goals (PT) progress toward functional goals is good (P)   -ZT     Daily Progress Summary (PT) Pt presents with decreased activity tolerance and decreased endurance. He is able to tolerate bed exercises but requires rest breaks between exercises. He continues to require skilled PT services to address his endurance deficits. (P)   -ZT       Row Name 10/06/23 1400          Therapy Plan Review/Discharge Plan (PT)    Therapy Plan Review (PT) evaluation/treatment results reviewed;care plan/treatment goals reviewed;participants included;patient (P)   -ZT               User Key  (r) = Recorded By, (t) = Taken By, (c) = Cosigned By      Initials Name Provider Type    Sarah Bernstein, RN Registered Nurse    Dottie Foster RN Registered Nurse    Katlyn Garcia RN Registered Nurse    Oscar Ward, PT Student PT Student                    Physical Therapy Education       Title: PT OT SLP Therapies (Done)       Topic: Physical Therapy (Done)       Point: Mobility training (Done)       Learning Progress Summary             Patient Acceptance, E, VU,NR by RASHARD at 9/24/2023 0604    Acceptance, E, VU,DU by RF at 9/18/2023 1537    Acceptance, E, VU,DU by RF at 9/17/2023 1515    Acceptance, E, VU,DU by RF at 9/16/2023 1804    Acceptance, E, VU by AV at 9/13/2023 1059    Acceptance, E,TB, VU by  at 9/12/2023 1308                         Point: Home exercise program (Done)       Learning Progress Summary             Patient Acceptance, E, VU,NR by RASHARD at 9/24/2023 0604    Acceptance, E, VU,DU by RF at 9/18/2023 1537    Acceptance, E, VU,DU by RF at 9/17/2023 1515    Acceptance, E, VU,DU by RF at 9/16/2023 1804    Acceptance, E, VU by AV at 9/13/2023 1059    Acceptance, E,TB, VU by  at 9/12/2023 1308                         Point: Body  mechanics (Done)       Learning Progress Summary             Patient Acceptance, E, VU,NR by RASHARD at 9/24/2023 0604    Acceptance, E, VU,DU by  at 9/18/2023 1537    Acceptance, E, VU,DU by RF at 9/17/2023 1515    Acceptance, E, VU,DU by RF at 9/16/2023 1804    Acceptance, E, VU by AV at 9/13/2023 1059    Acceptance, E,TB, VU by  at 9/12/2023 1308                         Point: Precautions (Done)       Learning Progress Summary             Patient Acceptance, E, VU,NR by RASHARD at 9/24/2023 0604    Acceptance, E, VU,DU by RF at 9/18/2023 1537    Acceptance, E, VU,DU by  at 9/17/2023 1515    Acceptance, E, VU,DU by  at 9/16/2023 1804    Acceptance, E, VU by AV at 9/13/2023 1059    Acceptance, E,TB, VU by  at 9/12/2023 1308                                         User Key       Initials Effective Dates Name Provider Type Discipline     06/16/21 -  Selena Lindquist, RN Registered Nurse Nurse     06/16/21 -  Uvaldo Christine OT Occupational Therapist OT     01/19/22 -  Karl Baker, RN Registered Nurse Nurse     08/16/23 -  Gamal Simmons PT Student PT Student PT                  PT Recommendation and Plan  Anticipated Discharge Disposition (PT): (P) inpatient rehabilitation facility  Planned Therapy Interventions (PT): (P) balance training, bed mobility training, gait training, stair training, strengthening, transfer training  Therapy Frequency (PT): (P) daily  Progress Summary (PT)  Progress Toward Functional Goals (PT): (P) progress toward functional goals is good  Daily Progress Summary (PT): (P) Pt presents with decreased activity tolerance and decreased endurance. He is able to tolerate bed exercises but requires rest breaks between exercises. He continues to require skilled PT services to address his endurance deficits.  Plan of Care Reviewed With: (P) patient   Outcome Measures       Row Name 10/06/23 1400 10/04/23 1500 10/03/23 1500       How much help from another person do you currently  need...    Turning from your back to your side while in flat bed without using bedrails? 3 (P)   -ZT 3  -MOE (r) ZT (t) MOE (c) 3  -MOE (r) RH (t) MOE (c)    Moving from lying on back to sitting on the side of a flat bed without bedrails? 3 (P)   -ZT 3  -MOE (r) ZT (t) MOE (c) 3  -MEO (r) RH (t) MOE (c)    Moving to and from a bed to a chair (including a wheelchair)? 2 (P)   -ZT 2  -MOE (r) ZT (t) MOE (c) 2  -MOE (r) RH (t) MOE (c)    Standing up from a chair using your arms (e.g., wheelchair, bedside chair)? 2 (P)   -ZT 1  -MOE (r) ZT (t) MOE (c) 1  -MOE (r) RH (t) MOE (c)    Climbing 3-5 steps with a railing? 1 (P)   -ZT 1  -MEO (r) ZT (t) MOE (c) 1  -MOE (r) RH (t) MOE (c)    To walk in hospital room? 1 (P)   -ZT 1  -MOE (r) ZT (t) MOE (c) 1  -MOE (r) RH (t) MOE (c)    AM-PAC 6 Clicks Score (PT) 12 (P)   -ZT 11  -MOE (r) ZT (t) 11  -MOE (r) RH (t)              User Key  (r) = Recorded By, (t) = Taken By, (c) = Cosigned By      Initials Name Provider Type    Merlin De La O, PT Physical Therapist    RH Gamal Simmons, PT Student PT Student    ZT Oscar Shields, PT Student PT Student                     Time Calculation:    PT Charges       Row Name 10/06/23 1430             Time Calculation    PT Received On 10/06/23 (P)   -ZT      PT Goal Re-Cert Due Date 10/15/23 (P)   -ZT         Timed Charges    75981 - PT Therapeutic Exercise Minutes 15 (P)   -ZT         Total Minutes    Timed Charges Total Minutes 15 (P)   -ZT       Total Minutes 15 (P)   -ZT                User Key  (r) = Recorded By, (t) = Taken By, (c) = Cosigned By      Initials Name Provider Type    ZT Oscar Shields, PT Student PT Student                      PT G-Codes  Outcome Measure Options: AM-PAC 6 Clicks Daily Activity (OT), Optimal Instrument  AM-PAC 6 Clicks Score (PT): (P) 12  AM-PAC 6 Clicks Score (OT): 15    Oscar Shields, PT Student  10/6/2023

## 2023-10-07 LAB
GLUCOSE BLDC GLUCOMTR-MCNC: 135 MG/DL (ref 70–99)
GLUCOSE BLDC GLUCOMTR-MCNC: 174 MG/DL (ref 70–99)
GLUCOSE BLDC GLUCOMTR-MCNC: 180 MG/DL (ref 70–99)
GLUCOSE BLDC GLUCOMTR-MCNC: 192 MG/DL (ref 70–99)

## 2023-10-07 PROCEDURE — 63710000001 INSULIN DETEMIR PER 5 UNITS: Performed by: INTERNAL MEDICINE

## 2023-10-07 PROCEDURE — 63710000001 INSULIN LISPRO (HUMAN) PER 5 UNITS: Performed by: INTERNAL MEDICINE

## 2023-10-07 PROCEDURE — 82948 REAGENT STRIP/BLOOD GLUCOSE: CPT

## 2023-10-07 PROCEDURE — 99232 SBSQ HOSP IP/OBS MODERATE 35: CPT | Performed by: INTERNAL MEDICINE

## 2023-10-07 RX ADMIN — TRAMADOL HYDROCHLORIDE 50 MG: 50 TABLET, COATED ORAL at 16:49

## 2023-10-07 RX ADMIN — INSULIN DETEMIR 45 UNITS: 100 INJECTION, SOLUTION SUBCUTANEOUS at 08:31

## 2023-10-07 RX ADMIN — TRAMADOL HYDROCHLORIDE 50 MG: 50 TABLET, COATED ORAL at 23:12

## 2023-10-07 RX ADMIN — DIPHENOXYLATE HYDROCHLORIDE AND ATROPINE SULFATE 1 TABLET: 2.5; .025 TABLET ORAL at 21:15

## 2023-10-07 RX ADMIN — INSULIN LISPRO 4 UNITS: 100 INJECTION, SOLUTION INTRAVENOUS; SUBCUTANEOUS at 08:31

## 2023-10-07 RX ADMIN — CYANOCOBALAMIN TAB 500 MCG 1000 MCG: 500 TAB at 08:33

## 2023-10-07 RX ADMIN — BACLOFEN 10 MG: 10 TABLET ORAL at 11:09

## 2023-10-07 RX ADMIN — TRAMADOL HYDROCHLORIDE 50 MG: 50 TABLET, COATED ORAL at 03:52

## 2023-10-07 RX ADMIN — Medication: at 10:27

## 2023-10-07 RX ADMIN — INSULIN LISPRO 5 UNITS: 100 INJECTION, SOLUTION INTRAVENOUS; SUBCUTANEOUS at 17:30

## 2023-10-07 RX ADMIN — LISINOPRIL 2.5 MG: 2.5 TABLET ORAL at 08:33

## 2023-10-07 RX ADMIN — PREGABALIN 50 MG: 25 CAPSULE ORAL at 16:49

## 2023-10-07 RX ADMIN — FAMOTIDINE 40 MG: 20 TABLET, FILM COATED ORAL at 08:32

## 2023-10-07 RX ADMIN — EMPAGLIFLOZIN 10 MG: 10 TABLET, FILM COATED ORAL at 08:33

## 2023-10-07 RX ADMIN — HYDROCODONE BITARTRATE AND ACETAMINOPHEN 1 TABLET: 7.5; 325 TABLET ORAL at 01:40

## 2023-10-07 RX ADMIN — HYDROCODONE BITARTRATE AND ACETAMINOPHEN 1 TABLET: 7.5; 325 TABLET ORAL at 21:08

## 2023-10-07 RX ADMIN — ATORVASTATIN CALCIUM 10 MG: 10 TABLET, FILM COATED ORAL at 21:15

## 2023-10-07 RX ADMIN — MICONAZOLE NITRATE 1 APPLICATION: 2 POWDER TOPICAL at 21:09

## 2023-10-07 RX ADMIN — INSULIN LISPRO 4 UNITS: 100 INJECTION, SOLUTION INTRAVENOUS; SUBCUTANEOUS at 21:15

## 2023-10-07 RX ADMIN — INSULIN LISPRO 4 UNITS: 100 INJECTION, SOLUTION INTRAVENOUS; SUBCUTANEOUS at 12:41

## 2023-10-07 RX ADMIN — HYDROCODONE BITARTRATE AND ACETAMINOPHEN 1 TABLET: 7.5; 325 TABLET ORAL at 14:32

## 2023-10-07 RX ADMIN — HYDROCODONE BITARTRATE AND ACETAMINOPHEN 1 TABLET: 7.5; 325 TABLET ORAL at 07:42

## 2023-10-07 RX ADMIN — PREGABALIN 50 MG: 25 CAPSULE ORAL at 08:32

## 2023-10-07 RX ADMIN — INSULIN LISPRO 5 UNITS: 100 INJECTION, SOLUTION INTRAVENOUS; SUBCUTANEOUS at 08:31

## 2023-10-07 RX ADMIN — INSULIN LISPRO 5 UNITS: 100 INJECTION, SOLUTION INTRAVENOUS; SUBCUTANEOUS at 12:41

## 2023-10-07 RX ADMIN — INSULIN DETEMIR 45 UNITS: 100 INJECTION, SOLUTION SUBCUTANEOUS at 21:08

## 2023-10-07 RX ADMIN — APIXABAN 5 MG: 5 TABLET, FILM COATED ORAL at 21:08

## 2023-10-07 RX ADMIN — TRAMADOL HYDROCHLORIDE 50 MG: 50 TABLET, COATED ORAL at 10:26

## 2023-10-07 RX ADMIN — ACETAMINOPHEN 650 MG: 325 TABLET ORAL at 06:21

## 2023-10-07 RX ADMIN — APIXABAN 5 MG: 5 TABLET, FILM COATED ORAL at 08:33

## 2023-10-07 RX ADMIN — MICONAZOLE NITRATE 1 APPLICATION: 2 POWDER TOPICAL at 08:35

## 2023-10-07 RX ADMIN — PREGABALIN 50 MG: 25 CAPSULE ORAL at 21:08

## 2023-10-07 RX ADMIN — BACLOFEN 10 MG: 10 TABLET ORAL at 23:12

## 2023-10-07 NOTE — PROGRESS NOTES
[facility]   Hospitalist Progress Note  Date: 10/7/2023  Patient Name: Jose Shaikh  : 1960  MRN: [id number]  Date of admission: 9/10/2023  Consultants:   -Orthopedic Surgery: Dr. Riki Davis    Subjective   Subjective     Chief Complaint: Weakness    Summary:   Jose Shaikh is a 62 y.o. male with multiple medical problems who was discharged from [facility] the day prior to admission and stated he could barely move or get out of his car so EMS was called.  Patient had hip and knee injections in hopes that this would help with his pain and allow him to work better with physical therapy and did some a little bit but he is unable to walk at this time and  is continue to try arrange rehab placement.  During hospitalization patient was evaluated by orthopedic surgery at patient's request for left total knee arthroplasty.  Per orthopedic surgery patient is not a candidate for left total knee arthroplasty due to morbid obesity, diabetes and chronic morbidities.    Interval Followup:     -- No issues overnight    Pain Medication:   -[place]    Review of Systems   All systems reviewed and negative unless stated otherwise under subjective.    Objective   Objective     Vitals:   Temp:  [97.3 °F (36.3 °C)-98.6 °F (37 °C)] 97.3 °F (36.3 °C)  Heart Rate:  [80-90] 85  Resp:  [18] 18  BP: (103-129)/(55-68) 103/61  Physical Exam   Gen: No acute distress, sitting up in bed, conversant  Resp: CTAB, No w/r/r, good chest rise bilaterally, normal respiratory effort  Card: RRR, No m/r/g  Abd: Soft, Nontender, Nondistended, + bowel sounds    Result Review    Result Review:  I have personally reviewed the results as below and agree with these findings:  []  Laboratory:   CMP          2023    04:49 2023    06:47 10/6/2023    06:12   CMP   Glucose 197  133  153    BUN 14  11  18    Creatinine 0.50  0.51  0.49    EGFR 113.9  113.2  114.6    Sodium 136  139  133    Potassium 3.9  4.0  4.0    Chloride  104  103  102    Calcium 9.0  9.0  8.8    Total Protein 6.4  6.5     Albumin 3.4  3.3     Globulin 3.0  3.2     Total Bilirubin 1.0  1.1     Alkaline Phosphatase 198  196     AST (SGOT) 55  59     ALT (SGPT) 50  54     Albumin/Globulin Ratio 1.1  1.0     BUN/Creatinine Ratio 28.0  21.6  36.7    Anion Gap 7.9  7.7  9.0      CBC          9/27/2023    04:49 9/28/2023    06:47 10/6/2023    06:12   CBC   WBC 6.02  5.77  7.77    RBC 4.83  4.79  4.91    Hemoglobin 12.2  12.0  12.3    Hematocrit 38.9  39.1  40.2    MCV 80.5  81.6  81.9    MCH 25.3  25.1  25.1    MCHC 31.4  30.7  30.6    RDW 17.9  17.7  17.5    Platelets 104  97  111    Magnesium within normal limits.  Blood glucose well controlled.  []  Microbiology:   []  Radiology:   []  EKG/Telemetry:    []  Cardiology/Vascular:    []  Pathology:  []  Old records:  []  Other:    Assessment & Plan   Assessment / Plan     Assessment:  Generalized weakness  Type 2 diabetes mellitus  Essential hypertension  Chronic paroxysmal atrial fibrillation on Eliquis  Obesity (BMI: 42.88)  Debility with wheelchair dependence  Severe degenerative joint disease of lower extremities  Chronic wound    Plan:  -Importance of working PT/OT stressed at length the patient who voiced understanding  -Lyrica for neuropathic pain control  -Continue as needed Norco and tramadol  -Continue bowel regimen  -Continue to Jardiance and lisinopril  -Continue current insulin regimen  -PT/OT consulted and following  -Lab holiday  unless there is an acute change in the patient's condition  -Clinical course will dictate further management     DVT Prophylaxis: Apixaban  GI Prophylaxis: Famotidine  Diet: Diabetic  Dispo: Social work actively working on rehab placement  Code Status: Full code     Personally reviewed patients labs and imaging, discussed with patient and nurse at bedside.      Part of this note may be an electronic transcription/translation of spoken language to printed text using the Dragon  dictation system.    DVT prophylaxis:  Medical DVT prophylaxis orders are present.    CODE STATUS:   Code Status (Patient has no pulse and is not breathing): CPR (Attempt to Resuscitate)  Medical Interventions (Patient has pulse or is breathing): Full Support    Electronically signed by Darrin Lamar MD, 10/07/23, 3:49 PM EDT.

## 2023-10-07 NOTE — PLAN OF CARE
"Goal Outcome Evaluation:           Progress: improving  Outcome Evaluation: c/o pain/popping/grinding in hips especially left hip. discussed with patient abouut spacing out q6h prn meds alternating q3 and patient was agreeable with trialing it. patient states pain has gone from chronic 10+ to \"reasonable\" 7 when medicating. encouraged making PT therapy priority over meals when they come to work with him, patient did work with PT today after initially declining at breakfast time. skin care performed per orders. wound care to foot done by wocrn this shift. no new issues/needs noted at this time.         "

## 2023-10-08 LAB
GLUCOSE BLDC GLUCOMTR-MCNC: 128 MG/DL (ref 70–99)
GLUCOSE BLDC GLUCOMTR-MCNC: 147 MG/DL (ref 70–99)
GLUCOSE BLDC GLUCOMTR-MCNC: 173 MG/DL (ref 70–99)
GLUCOSE BLDC GLUCOMTR-MCNC: 208 MG/DL (ref 70–99)

## 2023-10-08 PROCEDURE — 63710000001 INSULIN LISPRO (HUMAN) PER 5 UNITS: Performed by: INTERNAL MEDICINE

## 2023-10-08 PROCEDURE — 82948 REAGENT STRIP/BLOOD GLUCOSE: CPT

## 2023-10-08 PROCEDURE — 99232 SBSQ HOSP IP/OBS MODERATE 35: CPT | Performed by: INTERNAL MEDICINE

## 2023-10-08 PROCEDURE — 63710000001 INSULIN DETEMIR PER 5 UNITS: Performed by: INTERNAL MEDICINE

## 2023-10-08 RX ORDER — BISACODYL 10 MG
10 SUPPOSITORY, RECTAL RECTAL DAILY PRN
Status: DISCONTINUED | OUTPATIENT
Start: 2023-10-08 | End: 2023-11-06 | Stop reason: HOSPADM

## 2023-10-08 RX ORDER — POLYETHYLENE GLYCOL 3350 17 G/17G
17 POWDER, FOR SOLUTION ORAL DAILY PRN
Status: DISCONTINUED | OUTPATIENT
Start: 2023-10-08 | End: 2023-11-06 | Stop reason: HOSPADM

## 2023-10-08 RX ORDER — AMOXICILLIN 250 MG
2 CAPSULE ORAL 2 TIMES DAILY PRN
Status: DISCONTINUED | OUTPATIENT
Start: 2023-10-08 | End: 2023-11-06 | Stop reason: HOSPADM

## 2023-10-08 RX ORDER — BISACODYL 5 MG/1
5 TABLET, DELAYED RELEASE ORAL DAILY PRN
Status: DISCONTINUED | OUTPATIENT
Start: 2023-10-08 | End: 2023-11-06 | Stop reason: HOSPADM

## 2023-10-08 RX ADMIN — HYDROCODONE BITARTRATE AND ACETAMINOPHEN 1 TABLET: 7.5; 325 TABLET ORAL at 16:32

## 2023-10-08 RX ADMIN — PREGABALIN 50 MG: 25 CAPSULE ORAL at 09:08

## 2023-10-08 RX ADMIN — LISINOPRIL 2.5 MG: 2.5 TABLET ORAL at 09:09

## 2023-10-08 RX ADMIN — APIXABAN 5 MG: 5 TABLET, FILM COATED ORAL at 09:08

## 2023-10-08 RX ADMIN — INSULIN LISPRO 5 UNITS: 100 INJECTION, SOLUTION INTRAVENOUS; SUBCUTANEOUS at 12:55

## 2023-10-08 RX ADMIN — FAMOTIDINE 40 MG: 20 TABLET, FILM COATED ORAL at 09:08

## 2023-10-08 RX ADMIN — INSULIN LISPRO 8 UNITS: 100 INJECTION, SOLUTION INTRAVENOUS; SUBCUTANEOUS at 20:25

## 2023-10-08 RX ADMIN — APIXABAN 5 MG: 5 TABLET, FILM COATED ORAL at 20:18

## 2023-10-08 RX ADMIN — TRAMADOL HYDROCHLORIDE 50 MG: 50 TABLET, COATED ORAL at 12:00

## 2023-10-08 RX ADMIN — HYDROCODONE BITARTRATE AND ACETAMINOPHEN 1 TABLET: 7.5; 325 TABLET ORAL at 03:00

## 2023-10-08 RX ADMIN — INSULIN LISPRO 5 UNITS: 100 INJECTION, SOLUTION INTRAVENOUS; SUBCUTANEOUS at 09:08

## 2023-10-08 RX ADMIN — BACLOFEN 10 MG: 10 TABLET ORAL at 11:34

## 2023-10-08 RX ADMIN — ATORVASTATIN CALCIUM 10 MG: 10 TABLET, FILM COATED ORAL at 20:18

## 2023-10-08 RX ADMIN — HYDROCODONE BITARTRATE AND ACETAMINOPHEN 1 TABLET: 7.5; 325 TABLET ORAL at 09:08

## 2023-10-08 RX ADMIN — INSULIN LISPRO 5 UNITS: 100 INJECTION, SOLUTION INTRAVENOUS; SUBCUTANEOUS at 18:19

## 2023-10-08 RX ADMIN — INSULIN DETEMIR 45 UNITS: 100 INJECTION, SOLUTION SUBCUTANEOUS at 09:09

## 2023-10-08 RX ADMIN — TRAMADOL HYDROCHLORIDE 50 MG: 50 TABLET, COATED ORAL at 18:55

## 2023-10-08 RX ADMIN — Medication: at 09:10

## 2023-10-08 RX ADMIN — CYANOCOBALAMIN TAB 500 MCG 1000 MCG: 500 TAB at 09:09

## 2023-10-08 RX ADMIN — MICONAZOLE NITRATE 1 APPLICATION: 2 POWDER TOPICAL at 09:10

## 2023-10-08 RX ADMIN — HYDROCODONE BITARTRATE AND ACETAMINOPHEN 1 TABLET: 7.5; 325 TABLET ORAL at 22:22

## 2023-10-08 RX ADMIN — BACLOFEN 10 MG: 10 TABLET ORAL at 23:31

## 2023-10-08 RX ADMIN — PREGABALIN 50 MG: 25 CAPSULE ORAL at 16:42

## 2023-10-08 RX ADMIN — TRAMADOL HYDROCHLORIDE 50 MG: 50 TABLET, COATED ORAL at 05:56

## 2023-10-08 RX ADMIN — EMPAGLIFLOZIN 10 MG: 10 TABLET, FILM COATED ORAL at 09:09

## 2023-10-08 RX ADMIN — INSULIN LISPRO 4 UNITS: 100 INJECTION, SOLUTION INTRAVENOUS; SUBCUTANEOUS at 12:55

## 2023-10-08 RX ADMIN — PREGABALIN 50 MG: 25 CAPSULE ORAL at 20:18

## 2023-10-08 RX ADMIN — MICONAZOLE NITRATE 1 APPLICATION: 2 POWDER TOPICAL at 20:22

## 2023-10-08 RX ADMIN — INSULIN DETEMIR 45 UNITS: 100 INJECTION, SOLUTION SUBCUTANEOUS at 20:19

## 2023-10-08 NOTE — PROGRESS NOTES
[facility]   Hospitalist Progress Note  Date: 10/8/2023  Patient Name: Jose Shaikh  : 1960  MRN: [id number]  Date of admission: 9/10/2023  Consultants:   -Orthopedic Surgery: Dr. Riki Davis    Subjective   Subjective     Chief Complaint: Weakness    Summary:   Jose Shaikh is a 62 y.o. male with multiple medical problems [facility] and stated he could barely move or get out of his car so EMS was called.  Patient had hip and knee injections in hopes that this would help with his pain and allow him to work better with physical therapy and did some a little bit but he is unable to walk at this time and  is continue to try arrange rehab placement.  During hospitalization patient was evaluated by orthopedic surgery at patient's request for left total knee arthroplasty.  Per orthopedic surgery patient is not a candidate for left total knee arthroplasty due to morbid obesity, diabetes and chronic morbidities.    Interval Followup:   No acute events overnight.  Patient working with PT/OT.  He denies any chest pain or shortness breath.  Nursing with no additional acute issues to report.    Pain Medication:   -[place]    Review of Systems   All systems reviewed and negative unless stated otherwise under subjective.    Objective   Objective     Vitals:   Temp:  [97.3 °F (36.3 °C)-98.1 °F (36.7 °C)] 97.7 °F (36.5 °C)  Heart Rate:  [72-88] 88  Resp:  [18-20] 20  BP: ()/(46-82) 99/46  Physical Exam   Gen: No acute distress, conversant, sitting up in bed eating breakfast  Resp: CTAB, No w/r/r, no increase in work of breathing  Card: RRR, No m/r/g  Abd: Soft, Nontender, Nondistended, + bowel sounds    Result Review    Result Review:  I have personally reviewed the results as below and agree with these findings:  []  Laboratory:   CMP          2023    04:49 2023    06:47 10/6/2023    06:12   CMP   Glucose 197  133  153    BUN 14   11  18    Creatinine 0.50  0.51  0.49    EGFR 113.9  113.2  114.6    Sodium 136  139  133    Potassium 3.9  4.0  4.0    Chloride 104  103  102    Calcium 9.0  9.0  8.8    Total Protein 6.4  6.5     Albumin 3.4  3.3     Globulin 3.0  3.2     Total Bilirubin 1.0  1.1     Alkaline Phosphatase 198  196     AST (SGOT) 55  59     ALT (SGPT) 50  54     Albumin/Globulin Ratio 1.1  1.0     BUN/Creatinine Ratio 28.0  21.6  36.7    Anion Gap 7.9  7.7  9.0      CBC          9/27/2023    04:49 9/28/2023    06:47 10/6/2023    06:12   CBC   WBC 6.02  5.77  7.77    RBC 4.83  4.79  4.91    Hemoglobin 12.2  12.0  12.3    Hematocrit 38.9  39.1  40.2    MCV 80.5  81.6  81.9    MCH 25.3  25.1  25.1    MCHC 31.4  30.7  30.6    RDW 17.9  17.7  17.5    Platelets 104  97  111    Blood glucose well controlled  []  Microbiology:   []  Radiology:   []  EKG/Telemetry:    []  Cardiology/Vascular:    []  Pathology:  []  Old records:  []  Other:    Assessment & Plan   Assessment / Plan     Assessment:  Generalized weakness  Type 2 diabetes mellitus  Essential hypertension  Chronic paroxysmal atrial fibrillation on Eliquis  Obesity (BMI: 42.88)  Debility with wheelchair dependence  Severe degenerative joint disease of lower extremities  Chronic wound    Plan:  -Importance of working PT/OT stressed at length the patient who voiced understanding  -Continue Lyrica for neuropathic pain control  -Continue as needed Norco and tramadol  -Continue bowel regimen  -Continue to Jardiance and lisinopril  -Continue current insulin regimen  -PT/OT consulted and following  -Lab holiday on 10/09/2023 unless there is an acute change in the patient's condition.  -Clinical course will dictate further management     DVT Prophylaxis: Apixaban  GI Prophylaxis: Famotidine  Diet: Diabetic  Dispo: Social work actively working on rehab placement  Code Status: Full code     Personally reviewed patients labs and imaging, discussed with patient and nurse at bedside.      Part  of this note may be an electronic transcription/translation of spoken language to printed text using the Dragon dictation system.    DVT prophylaxis:  Medical DVT prophylaxis orders are present.    CODE STATUS:   Code Status (Patient has no pulse and is not breathing): CPR (Attempt to Resuscitate)  Medical Interventions (Patient has pulse or is breathing): Full Support    Electronically signed by Joe Haynes MD, 10/08/23, 11:14 AM EDT.

## 2023-10-08 NOTE — PLAN OF CARE
Goal Outcome Evaluation:  Plan of Care Reviewed With: patient        Progress: improving  Outcome Evaluation: Patient remains A&Ox4. C/o back/hip pain. Medicated per mar. No new issues/needs at this time. VSS.

## 2023-10-09 LAB
GLUCOSE BLDC GLUCOMTR-MCNC: 156 MG/DL (ref 70–99)
GLUCOSE BLDC GLUCOMTR-MCNC: 157 MG/DL (ref 70–99)
GLUCOSE BLDC GLUCOMTR-MCNC: 158 MG/DL (ref 70–99)
GLUCOSE BLDC GLUCOMTR-MCNC: 169 MG/DL (ref 70–99)

## 2023-10-09 PROCEDURE — 99232 SBSQ HOSP IP/OBS MODERATE 35: CPT | Performed by: INTERNAL MEDICINE

## 2023-10-09 PROCEDURE — 63710000001 INSULIN LISPRO (HUMAN) PER 5 UNITS: Performed by: INTERNAL MEDICINE

## 2023-10-09 PROCEDURE — 82948 REAGENT STRIP/BLOOD GLUCOSE: CPT

## 2023-10-09 PROCEDURE — 97110 THERAPEUTIC EXERCISES: CPT

## 2023-10-09 PROCEDURE — 63710000001 INSULIN DETEMIR PER 5 UNITS: Performed by: INTERNAL MEDICINE

## 2023-10-09 RX ADMIN — DIPHENOXYLATE HYDROCHLORIDE AND ATROPINE SULFATE 1 TABLET: 2.5; .025 TABLET ORAL at 11:33

## 2023-10-09 RX ADMIN — TRAMADOL HYDROCHLORIDE 50 MG: 50 TABLET, COATED ORAL at 15:29

## 2023-10-09 RX ADMIN — INSULIN LISPRO 4 UNITS: 100 INJECTION, SOLUTION INTRAVENOUS; SUBCUTANEOUS at 12:31

## 2023-10-09 RX ADMIN — HYDROCODONE BITARTRATE AND ACETAMINOPHEN 1 TABLET: 7.5; 325 TABLET ORAL at 22:29

## 2023-10-09 RX ADMIN — PREGABALIN 50 MG: 25 CAPSULE ORAL at 20:13

## 2023-10-09 RX ADMIN — LISINOPRIL 2.5 MG: 2.5 TABLET ORAL at 08:45

## 2023-10-09 RX ADMIN — DIPHENOXYLATE HYDROCHLORIDE AND ATROPINE SULFATE 1 TABLET: 2.5; .025 TABLET ORAL at 20:13

## 2023-10-09 RX ADMIN — HYDROCODONE BITARTRATE AND ACETAMINOPHEN 1 TABLET: 7.5; 325 TABLET ORAL at 11:33

## 2023-10-09 RX ADMIN — ATORVASTATIN CALCIUM 10 MG: 10 TABLET, FILM COATED ORAL at 20:13

## 2023-10-09 RX ADMIN — INSULIN LISPRO 4 UNITS: 100 INJECTION, SOLUTION INTRAVENOUS; SUBCUTANEOUS at 17:21

## 2023-10-09 RX ADMIN — BACLOFEN 10 MG: 10 TABLET ORAL at 23:30

## 2023-10-09 RX ADMIN — INSULIN LISPRO 5 UNITS: 100 INJECTION, SOLUTION INTRAVENOUS; SUBCUTANEOUS at 12:31

## 2023-10-09 RX ADMIN — CYANOCOBALAMIN TAB 500 MCG 1000 MCG: 500 TAB at 08:45

## 2023-10-09 RX ADMIN — FAMOTIDINE 40 MG: 20 TABLET, FILM COATED ORAL at 08:45

## 2023-10-09 RX ADMIN — DIPHENOXYLATE HYDROCHLORIDE AND ATROPINE SULFATE 1 TABLET: 2.5; .025 TABLET ORAL at 03:45

## 2023-10-09 RX ADMIN — TRAMADOL HYDROCHLORIDE 50 MG: 50 TABLET, COATED ORAL at 00:32

## 2023-10-09 RX ADMIN — Medication: at 10:55

## 2023-10-09 RX ADMIN — INSULIN LISPRO 5 UNITS: 100 INJECTION, SOLUTION INTRAVENOUS; SUBCUTANEOUS at 08:45

## 2023-10-09 RX ADMIN — EMPAGLIFLOZIN 10 MG: 10 TABLET, FILM COATED ORAL at 08:45

## 2023-10-09 RX ADMIN — APIXABAN 5 MG: 5 TABLET, FILM COATED ORAL at 08:45

## 2023-10-09 RX ADMIN — INSULIN LISPRO 4 UNITS: 100 INJECTION, SOLUTION INTRAVENOUS; SUBCUTANEOUS at 20:17

## 2023-10-09 RX ADMIN — PREGABALIN 50 MG: 25 CAPSULE ORAL at 17:21

## 2023-10-09 RX ADMIN — INSULIN DETEMIR 45 UNITS: 100 INJECTION, SOLUTION SUBCUTANEOUS at 20:14

## 2023-10-09 RX ADMIN — INSULIN LISPRO 4 UNITS: 100 INJECTION, SOLUTION INTRAVENOUS; SUBCUTANEOUS at 08:46

## 2023-10-09 RX ADMIN — APIXABAN 5 MG: 5 TABLET, FILM COATED ORAL at 20:13

## 2023-10-09 RX ADMIN — TRAMADOL HYDROCHLORIDE 50 MG: 50 TABLET, COATED ORAL at 06:34

## 2023-10-09 RX ADMIN — INSULIN LISPRO 5 UNITS: 100 INJECTION, SOLUTION INTRAVENOUS; SUBCUTANEOUS at 17:21

## 2023-10-09 RX ADMIN — INSULIN DETEMIR 45 UNITS: 100 INJECTION, SOLUTION SUBCUTANEOUS at 08:46

## 2023-10-09 RX ADMIN — MICONAZOLE NITRATE 1 APPLICATION: 2 POWDER TOPICAL at 08:47

## 2023-10-09 RX ADMIN — HYDROCODONE BITARTRATE AND ACETAMINOPHEN 1 TABLET: 7.5; 325 TABLET ORAL at 04:31

## 2023-10-09 RX ADMIN — PREGABALIN 50 MG: 25 CAPSULE ORAL at 08:45

## 2023-10-09 RX ADMIN — MICONAZOLE NITRATE 1 APPLICATION: 2 POWDER TOPICAL at 20:15

## 2023-10-09 NOTE — PROGRESS NOTES
[facility]   Hospitalist Progress Note  Date: 10/9/2023  Patient Name: Jose Shaikh  : 1960  MRN: [id number]  Date of admission: 9/10/2023  Consultants:   -Orthopedic Surgery: Dr. Riki Davis    Subjective   Subjective     Chief Complaint: Weakness    Summary:   Jose Shaikh is a 62 y.o. male with multiple medical problems who was discharged from [facility] the day prior to admission and stated he could barely move or get out of his car so EMS was called.  Patient had hip and knee injections in hopes that this would help with his pain and allow him to work better with physical therapy and did some a little bit but he is unable to walk at this time and  is continue to try arrange rehab placement.  During hospitalization patient was evaluated by orthopedic surgery at patient's request for left total knee arthroplasty.  Per orthopedic surgery patient is not a candidate for left total knee arthroplasty due to morbid obesity, diabetes and chronic morbidities.    Interval Followup:   No acute issues overnight.  Patient denies any chest pain or shortness of breath.  He states that he has been working well with PT/OT services.  Nursing with no additional issues to report.    Pain Medication:   -[place]    Review of Systems   All systems reviewed and negative unless stated otherwise under subjective.    Objective   Objective     Vitals:   Temp:  [97.3 °F (36.3 °C)-99.5 °F (37.5 °C)] 97.3 °F (36.3 °C)  Heart Rate:  [55-98] 85  Resp:  [18-22] 18  BP: ()/(48-80) 107/59  Physical Exam   Gen: No acute distress, lying in bed, conversant, pleasant  Resp: CTAB, No w/r/r, normal respiratory effort  Card: RRR, No m/r/g  Abd: Soft, Nontender, Nondistended, + bowel sounds    Result Review    Result Review:  I have personally reviewed the results as below and agree with these findings:  []  Laboratory:   CMP          2023    04:49 2023    06:47 10/6/2023    06:12   CMP   Glucose 197  133   153    BUN 14  11  18    Creatinine 0.50  0.51  0.49    EGFR 113.9  113.2  114.6    Sodium 136  139  133    Potassium 3.9  4.0  4.0    Chloride 104  103  102    Calcium 9.0  9.0  8.8    Total Protein 6.4  6.5     Albumin 3.4  3.3     Globulin 3.0  3.2     Total Bilirubin 1.0  1.1     Alkaline Phosphatase 198  196     AST (SGOT) 55  59     ALT (SGPT) 50  54     Albumin/Globulin Ratio 1.1  1.0     BUN/Creatinine Ratio 28.0  21.6  36.7    Anion Gap 7.9  7.7  9.0      CBC          9/27/2023    04:49 9/28/2023    06:47 10/6/2023    06:12   CBC   WBC 6.02  5.77  7.77    RBC 4.83  4.79  4.91    Hemoglobin 12.2  12.0  12.3    Hematocrit 38.9  39.1  40.2    MCV 80.5  81.6  81.9    MCH 25.3  25.1  25.1    MCHC 31.4  30.7  30.6    RDW 17.9  17.7  17.5    Platelets 104  97  111    Blood glucose well controlled.  []  Microbiology:   []  Radiology:   []  EKG/Telemetry:    []  Cardiology/Vascular:    []  Pathology:  []  Old records:  []  Other:    Assessment & Plan   Assessment / Plan     Assessment:  Generalized weakness  Type 2 diabetes mellitus  Essential hypertension  Chronic paroxysmal atrial fibrillation on Eliquis  Obesity (BMI: 42.88)  Debility with wheelchair dependence  Severe degenerative joint disease of lower extremities  Chronic wound    Plan:  -Importance of working PT/OT stressed at length with the patient who voiced understanding  -Continue Lyrica for neuropathic pain control  -Continue as needed Norco and tramadol  -Continue bowel regimen  -Continue to Jardiance and lisinopril  -Continue current insulin regimen  -PT/OT consulted and following  -Lab holiday on 10/10/2023 unless there is an acute change in the patient's condition.  -Clinical course will dictate further management     DVT Prophylaxis: Apixaban  GI Prophylaxis: Famotidine  Diet: Diabetic  Dispo: Social work actively working on rehab placement  Code Status: Full code     Personally reviewed patients labs and imaging, discussed with patient and nurse  at bedside.      Part of this note may be an electronic transcription/translation of spoken language to printed text using the Dragon dictation system.    DVT prophylaxis:  Medical DVT prophylaxis orders are present.    CODE STATUS:   Code Status (Patient has no pulse and is not breathing): CPR (Attempt to Resuscitate)  Medical Interventions (Patient has pulse or is breathing): Full Support    Electronically signed by Joe Haynes MD, 10/09/23, 12:21 PM EDT.

## 2023-10-09 NOTE — PLAN OF CARE
Goal Outcome Evaluation:  Plan of Care Reviewed With: patient        Progress: no change  Outcome Evaluation: Patient remains alert and oriented x4. Medicated with PRN pain medication per MAR. Blood glucose monitored. Wound and skin care performed as ordered. No new issues at this time.

## 2023-10-09 NOTE — THERAPY TREATMENT NOTE
Acute Care - Physical Therapy Treatment Note  [facility]     Patient Name: Jose Shaikh  : 1960  MRN: [id number]  Today's Date: 10/9/2023      Visit Dx:     ICD-10-CM ICD-9-CM   1. Generalized weakness  R53.1 780.79   2. Hyponatremia  E87.1 276.1   3. Difficulty in walking  R26.2 719.7   4. Decreased activities of daily living (ADL)  Z78.9 V49.89   5. Difficulty walking  R26.2 719.7     Patient Active Problem List   Diagnosis    Diabetic ulcer of left heel associated with type 2 DM    Acute osteomyelitis of left calcaneus     Diabetic ulcer of left heel associated with type 2 DM    Diabetic ulcer of right midfoot associated with type 2 DM    Paroxysmal atrial fibrillation    Essential hypertension    Hyperlipidemia LDL goal <100    Cellulitis and abscess of foot    High alkaline phosphatase level    Osteomyelitis    Onychomycosis    Onychocryptosis    Foot pain, bilateral    Osteomyelitis of foot, right, acute    Cellulitis of right foot    Type 2 diabetes mellitus, with long-term current use of insulin    Class 3 severe obesity due to excess calories with serious comorbidity and body mass index (BMI) of 45.0 to 49.9 in adult    Anxiety disorder, unspecified    Claustrophobia    Dependence on wheelchair    Depression, unspecified    Long term (current) use of anticoagulants    Long term (current) use of oral hypoglycemic drugs    Wound of foot    Non-prs chronic ulcer oth prt r foot limited to brkdwn skin    Orthostatic hypotension    Other chronic osteomyelitis, right ankle and foot    Personal history of nicotine dependence    Thrombocytopenia, unspecified    Unspecified open wound, right foot, initial encounter    Diabetic foot infection    Subacute osteomyelitis of right foot    Right foot pain    Sepsis    Onychomycosis    Foot pain, left    Impaired mobility and ADLs    Absence of toe of right foot    Corns and callosity    Disability of walking    Fracture    Limb swelling    Polyneuropathy     Pressure ulcer, stage 1    Shortness of breath    Generalized weakness     Past Medical History:   Diagnosis Date    Absence of toe of right foot     Acute osteomyelitis of left calcaneus  8/18/2021    Anxiety and depression     Arthritis     Claustrophobia     Corns and callus     Diabetic ulcer of left heel associated with type 2 DM 8/18/2021    Diabetic ulcer of left heel associated with type 2 DM 7/6/2021    Diabetic ulcer of right midfoot associated with type 2 DM 8/18/2021    Difficulty walking     Essential hypertension 8/31/2021    Hammertoe     Hyperlipidemia LDL goal <100 8/31/2021    Ingrown toenail     Obesity     Paroxysmal atrial fibrillation 8/31/2021    Polyneuropathy     Pressure ulcer, stage 1     Tinea unguium     Type 2 diabetes mellitus with polyneuropathy      Past Surgical History:   Procedure Laterality Date    CYST REMOVAL      center of back; benign    INCISION AND DRAINAGE ABSCESS      back    INCISION AND DRAINAGE LEG Right 12/10/2021    Procedure: INCISION AND DRAINAGE LOWER EXTREMITY;  Surgeon: Ash Leyva DPM;  Location: [facility];  Service: Podiatry;  Laterality: Right;    OTHER SURGICAL HISTORY      Surgical clips left foot    TOE SURGERY Right     Removal of 5th toe    TRANS METATARSAL AMPUTATION Right 12/2/2021    Procedure: AMPUTATION TRANS METATARSAL;  Surgeon: Ash Leyva DPM;  Location: [facility];  Service: Podiatry;  Laterality: Right;    WRIST SURGERY Left     repair of injury     PT Assessment (last 12 hours)       PT Evaluation and Treatment       Row Name 10/09/23 1300          Physical Therapy Time and Intention    Subjective Information no complaints (P)   -AM     Document Type therapy note (daily note) (P)   -AM     Mode of Treatment individual therapy;physical therapy (P)   -AM     Patient Effort good (P)   -AM     Symptoms Noted During/After Treatment none (P)   -AM       Row Name 10/09/23 1300          General Information    Patient  Observations alert;cooperative;agree to therapy (P)   -AM       Row Name 10/09/23 1300          Bed Mobility    Bed Mobility supine-sit;sit-supine (P)   -AM     Scooting/Bridging [place] (Bed Mobility) moderate assist (50% patient effort);2 person assist (P)   -AM     Supine-Sit [place] (Bed Mobility) moderate assist (50% patient effort) (P)   -AM     Sit-Supine [place] (Bed Mobility) moderate assist (50% patient effort) (P)   -AM       Row Name 10/09/23 1300          Transfers    Transfers other (see comments) (P)   declined  -AM       Row Name 10/09/23 1300          Gait/Stairs (Locomotion)    Gait/Stairs Locomotion other (see comments) (P)   pt decline and reports unable to ambulate at this time  -AM       Row Name 10/09/23 1300          Balance    Balance Assessment sitting dynamic balance (P)   -AM     Static Sitting Balance standby assist (P)   -AM     Dynamic Sitting Balance standby assist (P)   -AM       Row Name 10/09/23 1300          Motor Skills    Motor Skills motor control/coordination interventions (P)   -AM     Therapeutic Exercise hip;knee;ankle (P)   -AM       Row Name 10/09/23 1300          Hip (Therapeutic Exercise)    Hip (Therapeutic Exercise) strengthening exercise (P)   -AM     Hip Strengthening (Therapeutic Exercise) bilateral;marching while seated;aBduction;aDduction;20 repititions (P)   -AM       Row Name 10/09/23 1300          Knee (Therapeutic Exercise)    Knee (Therapeutic Exercise) strengthening exercise (P)   -AM     Knee Strengthening (Therapeutic Exercise) LAQ (long arc quad);20 repititions (P)   -AM       Row Name 10/09/23 1300          Ankle (Therapeutic Exercise)    Ankle (Therapeutic Exercise) strengthening exercise (P)   -AM     Ankle Strengthening (Therapeutic Exercise) bilateral;dorsiflexion;plantarflexion;20 repititions (P)   -AM       Row Name             Wound 09/10/23 5765 Right anterior hip MASD (Moisture associated skin damage)    Wound - Properties Group  Placement Date: 09/10/23  -BL Placement Time: 2345  -BL Present on Original Admission: Y  -BL Side: Right  -BL Orientation: anterior  -BL Location: hip  -BL Primary Wound Type: MASD  -BL    Retired Wound - Properties Group Placement Date: 09/10/23  -BL Placement Time: 2345  -BL Present on Original Admission: Y  -BL Side: Right  -BL Orientation: anterior  -BL Location: hip  -BL Primary Wound Type: MASD  -BL    Retired Wound - Properties Group Date first assessed: 09/10/23  -BL Time first assessed: 2345  -BL Present on Original Admission: Y  -BL Side: Right  -BL Location: hip  -BL Primary Wound Type: MASD  -BL      Row Name             Wound 12/02/21 Right anterior foot Incision    Wound - Properties Group Placement Date: 12/02/21  -ES Side: Right  -ES Orientation: anterior  -ES Location: foot  -ES Primary Wound Type: Incision  -ES    Retired Wound - Properties Group Placement Date: 12/02/21  -ES Side: Right  -ES Orientation: anterior  -ES Location: foot  -ES Primary Wound Type: Incision  -ES    Retired Wound - Properties Group Date first assessed: 12/02/21  -ES Side: Right  -ES Location: foot  -ES Primary Wound Type: Incision  -ES      Row Name             Wound 09/15/23 Right gluteal    Wound - Properties Group Placement Date: 09/15/23  -NH Side: Right  -NH Location: gluteal  -NH    Retired Wound - Properties Group Placement Date: 09/15/23  -NH Side: Right  -NH Location: gluteal  -NH    Retired Wound - Properties Group Date first assessed: 09/15/23  -NH Side: Right  -NH Location: gluteal  -NH      Row Name 10/09/23 1300          Progress Summary (PT)    Progress Toward Functional Goals (PT) progress toward functional goals is good (P)   -AM     Daily Progress Summary (PT) Pt tolerated therapeutic exercises well this date. (P)   -AM               User Key  (r) = Recorded By, (t) = Taken By, (c) = Cosigned By      Initials Name Provider Type    Sarah Bernstein RN Registered Nurse    Dottie Foster RN  Registered Nurse    Katlyn Garcia, RN Registered Nurse    Kenisha Fleming, PT Student PT Student                    Physical Therapy Education       Title: PT OT SLP Therapies (Done)       Topic: Physical Therapy (Done)       Point: Mobility training (Done)       Learning Progress Summary             Patient Acceptance, E, VU,NR by RASHARD at 9/24/2023 0604    Acceptance, E, VU,DU by RF at 9/18/2023 1537    Acceptance, E, VU,DU by RF at 9/17/2023 1515    Acceptance, E, VU,DU by RF at 9/16/2023 1804    Acceptance, E, VU by AV at 9/13/2023 1059    Acceptance, E,TB, VU by  at 9/12/2023 1308                         Point: Home exercise program (Done)       Learning Progress Summary             Patient Acceptance, E, VU,NR by RASHARD at 9/24/2023 0604    Acceptance, E, VU,DU by RF at 9/18/2023 1537    Acceptance, E, VU,DU by RF at 9/17/2023 1515    Acceptance, E, VU,DU by RF at 9/16/2023 1804    Acceptance, E, VU by AV at 9/13/2023 1059    Acceptance, E,TB, VU by  at 9/12/2023 1308                         Point: Body mechanics (Done)       Learning Progress Summary             Patient Acceptance, E, VU,NR by RASHARD at 9/24/2023 0604    Acceptance, E, VU,DU by RF at 9/18/2023 1537    Acceptance, E, VU,DU by RF at 9/17/2023 1515    Acceptance, E, VU,DU by RF at 9/16/2023 1804    Acceptance, E, VU by AV at 9/13/2023 1059    Acceptance, E,TB, VU by  at 9/12/2023 1308                         Point: Precautions (Done)       Learning Progress Summary             Patient Acceptance, E, VU,NR by RASHARD at 9/24/2023 0604    Acceptance, E, VU,DU by RF at 9/18/2023 1537    Acceptance, E, VU,DU by RF at 9/17/2023 1515    Acceptance, E, VU,DU by RF at 9/16/2023 1804    Acceptance, E, VU by AV at 9/13/2023 1059    Acceptance, E,TB, VU by  at 9/12/2023 1308                                         User Key       Initials Effective Dates Name Provider Type Discipline    RASHARD 06/16/21 -  Selena Lindquist, RN Registered Nurse Nurse    AV 06/16/21 -   Uvaldo Christine, BEKAH Occupational Therapist OT    RF 01/19/22 -  Karl Baker, RN Registered Nurse Nurse     08/16/23 -  Gamal Simmons, PT Student PT Student PT                  PT Recommendation and Plan     Progress Summary (PT)  Progress Toward Functional Goals (PT): (P) progress toward functional goals is good  Daily Progress Summary (PT): (P) Pt tolerated therapeutic exercises well this date.   Outcome Measures       Row Name 10/09/23 1300 10/06/23 1400          How much help from another person do you currently need...    Turning from your back to your side while in flat bed without using bedrails? 4 (P)   -AM 3  -AV (r) ZT (t) AV (c)     Moving from lying on back to sitting on the side of a flat bed without bedrails? 3 (P)   -AM 3  -AV (r) ZT (t) AV (c)     Moving to and from a bed to a chair (including a wheelchair)? 2 (P)   -AM 2  -AV (r) ZT (t) AV (c)     Standing up from a chair using your arms (e.g., wheelchair, bedside chair)? 2 (P)   -AM 2  -AV (r) ZT (t) AV (c)     Climbing 3-5 steps with a railing? 1 (P)   -AM 1  -AV (r) ZT (t) AV (c)     To walk in hospital room? 1 (P)   -AM 1  -AV (r) ZT (t) AV (c)     AM-PAC 6 Clicks Score (PT) 13 (P)   -AM 12  -AV (r) ZT (t)     Highest level of mobility 4 --> Transferred to chair/commode (P)   -AM --        Functional Assessment    Outcome Measure Options AM-PAC 6 Clicks Basic Mobility (PT) (P)   -AM --               User Key  (r) = Recorded By, (t) = Taken By, (c) = Cosigned By      Initials Name Provider Type    AV Frankie Ospina, PT Physical Therapist    ZT Oscar Shields, PT Student PT Student    AM Kenisha Webb, PT Student PT Student                     Time Calculation:    PT Charges       Row Name 10/09/23 1320             Timed Charges    45577 - PT Therapeutic Exercise Minutes 10 (P)   -AM      84490 - PT Therapeutic Activity Minutes 5 (P)   -AM         Total Minutes    Timed Charges Total Minutes 15 (P)   -AM       Total Minutes 15  (P)   -AM                User Key  (r) = Recorded By, (t) = Taken By, (c) = Cosigned By      Initials Name Provider Type    AM Kenisha Webb, PT Student PT Student                  Therapy Charges for Today       Code Description Service Date Service Provider Modifiers Qty    [id number] HC PT THER PROC EA 15 MIN 10/9/2023 Kenisha Webb PT Student GP 1            PT G-Codes  Outcome Measure Options: (P) AM-PAC 6 Clicks Basic Mobility (PT)  AM-PAC 6 Clicks Score (PT): (P) 13  AM-PAC 6 Clicks Score (OT): 15    Kenisha Webb PT Student  10/9/2023

## 2023-10-10 LAB
GLUCOSE BLDC GLUCOMTR-MCNC: 134 MG/DL (ref 70–99)
GLUCOSE BLDC GLUCOMTR-MCNC: 142 MG/DL (ref 70–99)
GLUCOSE BLDC GLUCOMTR-MCNC: 226 MG/DL (ref 70–99)
GLUCOSE BLDC GLUCOMTR-MCNC: 273 MG/DL (ref 70–99)

## 2023-10-10 PROCEDURE — 63710000001 INSULIN LISPRO (HUMAN) PER 5 UNITS: Performed by: INTERNAL MEDICINE

## 2023-10-10 PROCEDURE — 82948 REAGENT STRIP/BLOOD GLUCOSE: CPT

## 2023-10-10 PROCEDURE — 97110 THERAPEUTIC EXERCISES: CPT

## 2023-10-10 PROCEDURE — 63710000001 INSULIN DETEMIR PER 5 UNITS: Performed by: INTERNAL MEDICINE

## 2023-10-10 PROCEDURE — 99232 SBSQ HOSP IP/OBS MODERATE 35: CPT | Performed by: INTERNAL MEDICINE

## 2023-10-10 RX ORDER — BACLOFEN 10 MG/1
10 TABLET ORAL 3 TIMES DAILY PRN
Status: DISCONTINUED | OUTPATIENT
Start: 2023-10-10 | End: 2023-11-06 | Stop reason: HOSPADM

## 2023-10-10 RX ORDER — IBUPROFEN 400 MG/1
400 TABLET ORAL EVERY 6 HOURS PRN
Status: DISCONTINUED | OUTPATIENT
Start: 2023-10-10 | End: 2023-11-06 | Stop reason: HOSPADM

## 2023-10-10 RX ADMIN — PREGABALIN 50 MG: 25 CAPSULE ORAL at 20:50

## 2023-10-10 RX ADMIN — INSULIN DETEMIR 45 UNITS: 100 INJECTION, SOLUTION SUBCUTANEOUS at 20:51

## 2023-10-10 RX ADMIN — TRAMADOL HYDROCHLORIDE 50 MG: 50 TABLET, COATED ORAL at 06:23

## 2023-10-10 RX ADMIN — DIPHENOXYLATE HYDROCHLORIDE AND ATROPINE SULFATE 1 TABLET: 2.5; .025 TABLET ORAL at 06:31

## 2023-10-10 RX ADMIN — INSULIN LISPRO 5 UNITS: 100 INJECTION, SOLUTION INTRAVENOUS; SUBCUTANEOUS at 08:14

## 2023-10-10 RX ADMIN — INSULIN LISPRO 5 UNITS: 100 INJECTION, SOLUTION INTRAVENOUS; SUBCUTANEOUS at 17:43

## 2023-10-10 RX ADMIN — EMPAGLIFLOZIN 10 MG: 10 TABLET, FILM COATED ORAL at 08:15

## 2023-10-10 RX ADMIN — DIPHENOXYLATE HYDROCHLORIDE AND ATROPINE SULFATE 1 TABLET: 2.5; .025 TABLET ORAL at 12:39

## 2023-10-10 RX ADMIN — IBUPROFEN 400 MG: 400 TABLET, FILM COATED ORAL at 17:43

## 2023-10-10 RX ADMIN — HYDROCODONE BITARTRATE AND ACETAMINOPHEN 1 TABLET: 7.5; 325 TABLET ORAL at 21:37

## 2023-10-10 RX ADMIN — PREGABALIN 50 MG: 25 CAPSULE ORAL at 08:15

## 2023-10-10 RX ADMIN — PREGABALIN 50 MG: 25 CAPSULE ORAL at 15:40

## 2023-10-10 RX ADMIN — CYANOCOBALAMIN TAB 500 MCG 1000 MCG: 500 TAB at 08:15

## 2023-10-10 RX ADMIN — HYDROCODONE BITARTRATE AND ACETAMINOPHEN 1 TABLET: 7.5; 325 TABLET ORAL at 04:41

## 2023-10-10 RX ADMIN — MICONAZOLE NITRATE 1 APPLICATION: 2 POWDER TOPICAL at 20:52

## 2023-10-10 RX ADMIN — APIXABAN 5 MG: 5 TABLET, FILM COATED ORAL at 08:15

## 2023-10-10 RX ADMIN — APIXABAN 5 MG: 5 TABLET, FILM COATED ORAL at 20:50

## 2023-10-10 RX ADMIN — TRAMADOL HYDROCHLORIDE 50 MG: 50 TABLET, COATED ORAL at 18:07

## 2023-10-10 RX ADMIN — TRAMADOL HYDROCHLORIDE 50 MG: 50 TABLET, COATED ORAL at 12:39

## 2023-10-10 RX ADMIN — TRAMADOL HYDROCHLORIDE 50 MG: 50 TABLET, COATED ORAL at 00:46

## 2023-10-10 RX ADMIN — INSULIN LISPRO 8 UNITS: 100 INJECTION, SOLUTION INTRAVENOUS; SUBCUTANEOUS at 17:42

## 2023-10-10 RX ADMIN — BACLOFEN 10 MG: 10 TABLET ORAL at 23:30

## 2023-10-10 RX ADMIN — HYDROCODONE BITARTRATE AND ACETAMINOPHEN 1 TABLET: 7.5; 325 TABLET ORAL at 10:21

## 2023-10-10 RX ADMIN — MICONAZOLE NITRATE 1 APPLICATION: 2 POWDER TOPICAL at 09:53

## 2023-10-10 RX ADMIN — INSULIN LISPRO 5 UNITS: 100 INJECTION, SOLUTION INTRAVENOUS; SUBCUTANEOUS at 12:39

## 2023-10-10 RX ADMIN — HYDROCODONE BITARTRATE AND ACETAMINOPHEN 1 TABLET: 7.5; 325 TABLET ORAL at 15:40

## 2023-10-10 RX ADMIN — INSULIN LISPRO 12 UNITS: 100 INJECTION, SOLUTION INTRAVENOUS; SUBCUTANEOUS at 21:02

## 2023-10-10 RX ADMIN — Medication: at 09:53

## 2023-10-10 RX ADMIN — LISINOPRIL 2.5 MG: 2.5 TABLET ORAL at 08:15

## 2023-10-10 RX ADMIN — INSULIN DETEMIR 45 UNITS: 100 INJECTION, SOLUTION SUBCUTANEOUS at 08:14

## 2023-10-10 RX ADMIN — BACLOFEN 10 MG: 10 TABLET ORAL at 10:21

## 2023-10-10 RX ADMIN — FAMOTIDINE 40 MG: 20 TABLET, FILM COATED ORAL at 08:15

## 2023-10-10 RX ADMIN — ATORVASTATIN CALCIUM 10 MG: 10 TABLET, FILM COATED ORAL at 20:50

## 2023-10-10 NOTE — THERAPY TREATMENT NOTE
Acute Care - Physical Therapy Treatment Note  KEO Dominguez     Patient Name: Jose Shaikh  : 1960  MRN: [id number]  Today's Date: 10/10/2023      Visit Dx:     ICD-10-CM ICD-9-CM   1. Generalized weakness  R53.1 780.79   2. Hyponatremia  E87.1 276.1   3. Difficulty in walking  R26.2 719.7   4. Decreased activities of daily living (ADL)  Z78.9 V49.89   5. Difficulty walking  R26.2 719.7     Patient Active Problem List   Diagnosis    Diabetic ulcer of left heel associated with type 2 DM    Acute osteomyelitis of left calcaneus     Diabetic ulcer of left heel associated with type 2 DM    Diabetic ulcer of right midfoot associated with type 2 DM    Paroxysmal atrial fibrillation    Essential hypertension    Hyperlipidemia LDL goal <100    Cellulitis and abscess of foot    High alkaline phosphatase level    Osteomyelitis    Onychomycosis    Onychocryptosis    Foot pain, bilateral    Osteomyelitis of foot, right, acute    Cellulitis of right foot    Type 2 diabetes mellitus, with long-term current use of insulin    Class 3 severe obesity due to excess calories with serious comorbidity and body mass index (BMI) of 45.0 to 49.9 in adult    Anxiety disorder, unspecified    Claustrophobia    Dependence on wheelchair    Depression, unspecified    Long term (current) use of anticoagulants    Long term (current) use of oral hypoglycemic drugs    Wound of foot    Non-prs chronic ulcer oth prt r foot limited to brkdwn skin    Orthostatic hypotension    Other chronic osteomyelitis, right ankle and foot    Personal history of nicotine dependence    Thrombocytopenia, unspecified    Unspecified open wound, right foot, initial encounter    Diabetic foot infection    Subacute osteomyelitis of right foot    Right foot pain    Sepsis    Onychomycosis    Foot pain, left    Impaired mobility and ADLs    Absence of toe of right foot    Corns and callosity    Disability of walking    Fracture    Limb swelling    Polyneuropathy     Pressure ulcer, stage 1    Shortness of breath    Generalized weakness     Past Medical History:   Diagnosis Date    Absence of toe of right foot     Acute osteomyelitis of left calcaneus  8/18/2021    Anxiety and depression     Arthritis     Claustrophobia     Corns and callus     Diabetic ulcer of left heel associated with type 2 DM 8/18/2021    Diabetic ulcer of left heel associated with type 2 DM 7/6/2021    Diabetic ulcer of right midfoot associated with type 2 DM 8/18/2021    Difficulty walking     Essential hypertension 8/31/2021    Hammertoe     Hyperlipidemia LDL goal <100 8/31/2021    Ingrown toenail     Obesity     Paroxysmal atrial fibrillation 8/31/2021    Polyneuropathy     Pressure ulcer, stage 1     Tinea unguium     Type 2 diabetes mellitus with polyneuropathy      Past Surgical History:   Procedure Laterality Date    CYST REMOVAL      center of back; benign    INCISION AND DRAINAGE ABSCESS      back    INCISION AND DRAINAGE LEG Right 12/10/2021    Procedure: INCISION AND DRAINAGE LOWER EXTREMITY;  Surgeon: Ash Leyva DPM;  Location: [facility];  Service: Podiatry;  Laterality: Right;    OTHER SURGICAL HISTORY      Surgical clips left foot    TOE SURGERY Right     Removal of 5th toe    TRANS METATARSAL AMPUTATION Right 12/2/2021    Procedure: AMPUTATION TRANS METATARSAL;  Surgeon: Ash Leyva DPM;  Location: [facility] MAIN OR;  Service: Podiatry;  Laterality: Right;    WRIST SURGERY Left     repair of injury     PT Assessment (last 12 hours)       PT Evaluation and Treatment       Row Name 10/10/23 1000          Physical Therapy Time and Intention    Subjective Information complains of;weakness;fatigue;pain (P)   -ZT     Document Type therapy note (daily note) (P)   -ZT     Mode of Treatment individual therapy;physical therapy (P)   -ZT     Patient Effort good (P)   -ZT       Row Name 10/10/23 1000          General Information    Patient Profile Reviewed yes (P)   -ZT      Patient Observations alert;cooperative;agree to therapy (P)   -     Existing Precautions/Restrictions fall (P)   -       Row Name 10/10/23 1000          Bed Mobility    Bed Mobility other (see comments) (P)   Pt performed bed exercises in supine  -       Row Name 10/10/23 1000          Transfers    Transfers other (see comments) (P)   Pt performed bed exercises in supine  -       Row Name 10/10/23 1000          Gait/Stairs (Locomotion)    Gait/Stairs Locomotion other (see comments) (P)   Pt performed bed exercises in supine  -       Row Name 10/10/23 1000          Safety Issues, Functional Mobility    Impairments Affecting Function (Mobility) balance;endurance/activity tolerance (P)   -       Row Name 10/10/23 1000          Balance    Balance Assessment other (see comments) (P)   Pt performed TherEx in supine  -       Row Name 10/10/23 1000          Motor Skills    Therapeutic Exercise knee;hip;ankle (P)   -       Row Name 10/10/23 1000          Hip (Therapeutic Exercise)    Hip Strengthening (Therapeutic Exercise) aBduction;aDduction;heel slides;20 repititions;other (see comments) (P)   Pt performed 20 reps of glute sets, heel slides, and assisted hip adduciton/abduction  -       Row Name 10/10/23 1000          Knee (Therapeutic Exercise)    Knee Strengthening (Therapeutic Exercise) SLR (straight leg raise);20 repititions;other (see comments) (P)   Pt performed 20 reps of quad sets and SLR bilaterally  -       Row Name 10/10/23 1000          Ankle (Therapeutic Exercise)    Ankle Strengthening (Therapeutic Exercise) dorsiflexion;plantarflexion;20 repititions;other (see comments) (P)   Pt performed 20 reps of ankle pumps  -       Row Name             Wound 09/10/23 2345 Right anterior hip MASD (Moisture associated skin damage)    Wound - Properties Group Placement Date: 09/10/23  -BL Placement Time: 2345 -BL Present on Original Admission: Y  -BL Side: Right  -BL Orientation: anterior  -BL  Location: hip  -BL Primary Wound Type: MASD  -BL    Retired Wound - Properties Group Placement Date: 09/10/23  -BL Placement Time: 2345 -BL Present on Original Admission: Y  -BL Side: Right  -BL Orientation: anterior  -BL Location: hip  -BL Primary Wound Type: MASD  -BL    Retired Wound - Properties Group Date first assessed: 09/10/23  -BL Time first assessed: 2345 -BL Present on Original Admission: Y  -BL Side: Right  -BL Location: hip  -BL Primary Wound Type: MASD  -BL      Row Name             Wound 12/02/21 Right anterior foot Incision    Wound - Properties Group Placement Date: 12/02/21  -ES Side: Right  -ES Orientation: anterior  -ES Location: foot  -ES Primary Wound Type: Incision  -ES    Retired Wound - Properties Group Placement Date: 12/02/21  -ES Side: Right  -ES Orientation: anterior  -ES Location: foot  -ES Primary Wound Type: Incision  -ES    Retired Wound - Properties Group Date first assessed: 12/02/21  -ES Side: Right  -ES Location: foot  -ES Primary Wound Type: Incision  -ES      Row Name             Wound 09/15/23 Right gluteal    Wound - Properties Group Placement Date: 09/15/23  -NH Side: Right  -NH Location: gluteal  -NH    Retired Wound - Properties Group Placement Date: 09/15/23  -NH Side: Right  -NH Location: gluteal  -NH    Retired Wound - Properties Group Date first assessed: 09/15/23  -NH Side: Right  -NH Location: gluteal  -NH      Row Name 10/10/23 1000          Plan of Care Review    Plan of Care Reviewed With patient (P)   -ZT       Row Name 10/10/23 1000          Therapy Assessment/Plan (PT)    Rehab Potential (PT) good, to achieve stated therapy goals (P)   -ZT     Criteria for Skilled Interventions Met (PT) yes;skilled treatment is necessary (P)   -ZT     Therapy Frequency (PT) daily (P)   -ZT     Predicted Duration of Therapy Intervention (PT) 10days (P)   -ZT     Problem List (PT) problems related to;balance;mobility;range of motion (ROM);strength;pain (P)   -ZT     Activity  Limitations Related to Problem List (PT) unable to ambulate safely;unable to transfer safely (P)   -ZT       Row Name 10/10/23 1000          Progress Summary (PT)    Progress Toward Functional Goals (PT) progress toward functional goals is good (P)   -ZT     Daily Progress Summary (PT) Pt presents in a deconditoned state secondary to recent hospital stay. He is able to tolerate more reps of TherEx and able to move his legs through a greater AROM. He continues to require skilled PT services to continue to build up his strength and endurance so that he can safely tolerate functional activity. (P)   -ZT       Row Name 10/10/23 1000          Therapy Plan Review/Discharge Plan (PT)    Therapy Plan Review (PT) evaluation/treatment results reviewed;care plan/treatment goals reviewed;participants included;patient (P)   -ZT               User Key  (r) = Recorded By, (t) = Taken By, (c) = Cosigned By      Initials Name Provider Type    Sarah Bernstein, RN Registered Nurse    Dottie Foster, RN Registered Nurse    Katlyn Garcia RN Registered Nurse    Oscar Ward, PT Student PT Student                    Physical Therapy Education       Title: PT OT SLP Therapies (Done)       Topic: Physical Therapy (Done)       Point: Mobility training (Done)       Learning Progress Summary             Patient Acceptance, E, VU,NR by RASHARD at 9/24/2023 0604    Acceptance, E, VU,DU by RF at 9/18/2023 1537    Acceptance, E, VU,DU by RF at 9/17/2023 1515    Acceptance, E, VU,DU by RF at 9/16/2023 1804    Acceptance, E, VU by AV at 9/13/2023 1059    Acceptance, E,TB, VU by  at 9/12/2023 1308                         Point: Home exercise program (Done)       Learning Progress Summary             Patient Acceptance, E, VU,NR by RASHARD at 9/24/2023 0604    Acceptance, E, VU,DU by RF at 9/18/2023 1537    Acceptance, E, VU,DU by RF at 9/17/2023 1515    Acceptance, E, VU,DU by RF at 9/16/2023 1804    Acceptance, E, VU by AV at  9/13/2023 1059    Acceptance, E,TB, VU by  at 9/12/2023 1308                         Point: Body mechanics (Done)       Learning Progress Summary             Patient Acceptance, E, VU,NR by RASHARD at 9/24/2023 0604    Acceptance, E, VU,DU by RF at 9/18/2023 1537    Acceptance, E, VU,DU by RF at 9/17/2023 1515    Acceptance, E, VU,DU by RF at 9/16/2023 1804    Acceptance, E, VU by AV at 9/13/2023 1059    Acceptance, E,TB, VU by  at 9/12/2023 1308                         Point: Precautions (Done)       Learning Progress Summary             Patient Acceptance, E, VU,NR by RASHARD at 9/24/2023 0604    Acceptance, E, VU,DU by  at 9/18/2023 1537    Acceptance, E, VU,DU by RF at 9/17/2023 1515    Acceptance, E, VU,DU by  at 9/16/2023 1804    Acceptance, E, VU by  at 9/13/2023 1059    Acceptance, E,TB, VU by  at 9/12/2023 1308                                         User Key       Initials Effective Dates Name Provider Type Discipline     06/16/21 -  Selena Lindquist, RONNIE Registered Nurse Nurse     06/16/21 -  Uvaldo Christine OT Occupational Therapist OT     01/19/22 -  Karl Baker, RN Registered Nurse Nurse     08/16/23 -  Gamal Simmons PT Student PT Student PT                  PT Recommendation and Plan  Anticipated Discharge Disposition (PT): (P) inpatient rehabilitation facility  Planned Therapy Interventions (PT): (P) balance training, bed mobility training, gait training, strengthening, stair training, transfer training  Therapy Frequency (PT): (P) daily  Progress Summary (PT)  Progress Toward Functional Goals (PT): (P) progress toward functional goals is good  Daily Progress Summary (PT): (P) Pt presents in a deconditoned state secondary to recent hospital stay. He is able to tolerate more reps of TherEx and able to move his legs through a greater AROM. He continues to require skilled PT services to continue to build up his strength and endurance so that he can safely tolerate functional  activity.  Plan of Care Reviewed With: (P) patient   Outcome Measures       Row Name 10/10/23 1000 10/09/23 1300          How much help from another person do you currently need...    Turning from your back to your side while in flat bed without using bedrails? 4 (P)   -ZT 4  -MOE (r) AM (t) MOE (c)     Moving from lying on back to sitting on the side of a flat bed without bedrails? 3 (P)   -ZT 3  -MOE (r) AM (t) MOE (c)     Moving to and from a bed to a chair (including a wheelchair)? 2 (P)   -ZT 2  -MOE (r) AM (t) MOE (c)     Standing up from a chair using your arms (e.g., wheelchair, bedside chair)? 1 (P)   -ZT 2  -MOE (r) AM (t) MOE (c)     Climbing 3-5 steps with a railing? 1 (P)   -ZT 1  -MOE (r) AM (t) MOE (c)     To walk in hospital room? 1 (P)   -ZT 1  -MOE (r) AM (t) MOE (c)     AM-PAC 6 Clicks Score (PT) 12 (P)   -ZT 13  -MOE (r) AM (t)     Highest level of mobility 4 --> Transferred to chair/commode (P)   -ZT 4 --> Transferred to chair/commode  -MOE (r) AM (t)        Functional Assessment    Outcome Measure Options -- AM-PAC 6 Clicks Basic Mobility (PT)  -MOE (r) AM (t) MOE (c)               User Key  (r) = Recorded By, (t) = Taken By, (c) = Cosigned By      Initials Name Provider Type    Merlin De La O, PT Physical Therapist    ZT Oscar Shields, PT Student PT Student    Kenisha Fleming, PT Student PT Student                     Time Calculation:    PT Charges       Row Name 10/10/23 1049             Time Calculation    PT Received On 10/10/23 (P)   -ZT      PT Goal Re-Cert Due Date 10/19/23 (P)   -ZT         Timed Charges    33698 - PT Therapeutic Exercise Minutes 15 (P)   -ZT         Total Minutes    Timed Charges Total Minutes 15 (P)   -ZT       Total Minutes 15 (P)   -ZT                User Key  (r) = Recorded By, (t) = Taken By, (c) = Cosigned By      Initials Name Provider Type    Oscar Ward PT Student PT Student                      PT G-Codes  Outcome Measure Options: AM-PAC 6 Clicks Basic  Mobility (PT)  AM-PAC 6 Clicks Score (PT): (P) 12  AM-PAC 6 Clicks Score (OT): 15    Oscar Shields, PT Student  10/10/2023

## 2023-10-10 NOTE — PROGRESS NOTES
[facility]   Hospitalist Progress Note  Date: 10/10/2023  Patient Name: Jose Shaikh  : 1960  MRN: [id number]  Date of admission: 9/10/2023  Consultants:   -Orthopedic Surgery: Dr. Riki Davis    Subjective   Subjective     Chief Complaint: Weakness    Summary:   Jose Shaikh is a 62 y.o. male with multiple medical problems who was discharged from [facility] the day prior to admission and stated he could barely move or get out of his car so EMS was called.  Patient had hip and knee injections in hopes that this would help with his pain and allow him to work better with physical therapy and did some a little bit but he is unable to walk at this time and  is continue to try arrange rehab placement.  During hospitalization patient was evaluated by orthopedic surgery at patient's request for left total knee arthroplasty.  Per orthopedic surgery patient is not a candidate for left total knee arthroplasty due to morbid obesity, diabetes and chronic morbidities.    Interval Followup:   No acute issues overnight.  Still having a significant amount of pain and is requesting his medications be increased.  Still having muscle spasms.    Objective   Objective     Vitals:   Temp:  [97.3 °F (36.3 °C)-97.9 °F (36.6 °C)] 97.9 °F (36.6 °C)  Heart Rate:  [79-88] 84  Resp:  [16-20] 18  BP: (107-126)/(58-70) 111/58  Physical Exam   Gen: No acute distress, lying in bed  Resp: CTAB, No w/r/r, normal respiratory effort  Card: RRR, No m/r/g  Abd: Soft, Nontender, Nondistended, + bowel sounds    Result Review    Result Review:  I have personally reviewed the results as below and agree with these findings:  [x]  Laboratory:   CMP          2023    04:49 2023    06:47 10/6/2023    06:12   CMP   Glucose 197  133  153    BUN 14  11  18    Creatinine 0.50  0.51  0.49    EGFR 113.9  113.2  114.6    Sodium 136  139  133    Potassium 3.9  4.0  4.0    Chloride 104  103  102    Calcium 9.0  9.0  8.8     Total Protein 6.4  6.5     Albumin 3.4  3.3     Globulin 3.0  3.2     Total Bilirubin 1.0  1.1     Alkaline Phosphatase 198  196     AST (SGOT) 55  59     ALT (SGPT) 50  54     Albumin/Globulin Ratio 1.1  1.0     BUN/Creatinine Ratio 28.0  21.6  36.7    Anion Gap 7.9  7.7  9.0      CBC          9/27/2023    04:49 9/28/2023    06:47 10/6/2023    06:12   CBC   WBC 6.02  5.77  7.77    RBC 4.83  4.79  4.91    Hemoglobin 12.2  12.0  12.3    Hematocrit 38.9  39.1  40.2    MCV 80.5  81.6  81.9    MCH 25.3  25.1  25.1    MCHC 31.4  30.7  30.6    RDW 17.9  17.7  17.5    Platelets 104  97  111    Blood glucose well controlled.  []  Microbiology:   []  Radiology:   []  EKG/Telemetry:    []  Cardiology/Vascular:    []  Pathology:  []  Old records:  []  Other:    Assessment & Plan   Assessment / Plan     Assessment/plan:  Generalized weakness  Type 2 diabetes mellitus  Essential hypertension  Chronic paroxysmal atrial fibrillation on Eliquis  Obesity (BMI: 42.88)  Debility with wheelchair dependence  Severe degenerative joint disease of lower extremities  Chronic wound    Continue to monitor in the hospital for management of the above  Increase baclofen to 3 times daily as needed  Continue as needed Norco and tramadol  Add ibuprofen every 4 hours as needed, monitor renal function in a.m.  Continue bowel regimen  Continue to Jardiance and lisinopril  Continue current insulin regimen  PT/OT consulted and following  Trend renal function and electrolytes with a.m. BMP, magnesium   Trend Hgb and WBC with a.m. CBC     Discussed case with: Bedside RN, social work    DVT prophylaxis:  Medical DVT prophylaxis orders are present.    CODE STATUS:   Code Status (Patient has no pulse and is not breathing): CPR (Attempt to Resuscitate)  Medical Interventions (Patient has pulse or is breathing): Full Support

## 2023-10-11 LAB
ANION GAP SERPL CALCULATED.3IONS-SCNC: 5.9 MMOL/L (ref 5–15)
BASOPHILS # BLD AUTO: 0.07 10*3/MM3 (ref 0–0.2)
BASOPHILS NFR BLD AUTO: 1.3 % (ref 0–1.5)
BUN SERPL-MCNC: 17 MG/DL (ref 8–23)
BUN/CREAT SERPL: 32.7 (ref 7–25)
CALCIUM SPEC-SCNC: 8.9 MG/DL (ref 8.6–10.5)
CHLORIDE SERPL-SCNC: 102 MMOL/L (ref 98–107)
CO2 SERPL-SCNC: 25.1 MMOL/L (ref 22–29)
CREAT SERPL-MCNC: 0.52 MG/DL (ref 0.76–1.27)
DEPRECATED RDW RBC AUTO: 51.1 FL (ref 37–54)
EGFRCR SERPLBLD CKD-EPI 2021: 112.6 ML/MIN/1.73
EOSINOPHIL # BLD AUTO: 0.1 10*3/MM3 (ref 0–0.4)
EOSINOPHIL NFR BLD AUTO: 1.8 % (ref 0.3–6.2)
ERYTHROCYTE [DISTWIDTH] IN BLOOD BY AUTOMATED COUNT: 17.4 % (ref 12.3–15.4)
GLUCOSE BLDC GLUCOMTR-MCNC: 125 MG/DL (ref 70–99)
GLUCOSE BLDC GLUCOMTR-MCNC: 135 MG/DL (ref 70–99)
GLUCOSE BLDC GLUCOMTR-MCNC: 177 MG/DL (ref 70–99)
GLUCOSE BLDC GLUCOMTR-MCNC: 223 MG/DL (ref 70–99)
GLUCOSE SERPL-MCNC: 129 MG/DL (ref 65–99)
HCT VFR BLD AUTO: 37.1 % (ref 37.5–51)
HGB BLD-MCNC: 11.6 G/DL (ref 13–17.7)
IMM GRANULOCYTES # BLD AUTO: 0.01 10*3/MM3 (ref 0–0.05)
IMM GRANULOCYTES NFR BLD AUTO: 0.2 % (ref 0–0.5)
LYMPHOCYTES # BLD AUTO: 1.46 10*3/MM3 (ref 0.7–3.1)
LYMPHOCYTES NFR BLD AUTO: 26.2 % (ref 19.6–45.3)
MAGNESIUM SERPL-MCNC: 2.1 MG/DL (ref 1.6–2.4)
MCH RBC QN AUTO: 25.6 PG (ref 26.6–33)
MCHC RBC AUTO-ENTMCNC: 31.3 G/DL (ref 31.5–35.7)
MCV RBC AUTO: 81.7 FL (ref 79–97)
MONOCYTES # BLD AUTO: 0.68 10*3/MM3 (ref 0.1–0.9)
MONOCYTES NFR BLD AUTO: 12.2 % (ref 5–12)
NEUTROPHILS NFR BLD AUTO: 3.26 10*3/MM3 (ref 1.7–7)
NEUTROPHILS NFR BLD AUTO: 58.3 % (ref 42.7–76)
NRBC BLD AUTO-RTO: 0 /100 WBC (ref 0–0.2)
PLATELET # BLD AUTO: 105 10*3/MM3 (ref 140–450)
PMV BLD AUTO: 11.2 FL (ref 6–12)
POTASSIUM SERPL-SCNC: 4.3 MMOL/L (ref 3.5–5.2)
RBC # BLD AUTO: 4.54 10*6/MM3 (ref 4.14–5.8)
RBC MORPH BLD: NORMAL
SMALL PLATELETS BLD QL SMEAR: NORMAL
SODIUM SERPL-SCNC: 133 MMOL/L (ref 136–145)
WBC MORPH BLD: NORMAL
WBC NRBC COR # BLD: 5.58 10*3/MM3 (ref 3.4–10.8)

## 2023-10-11 PROCEDURE — 80048 BASIC METABOLIC PNL TOTAL CA: CPT | Performed by: INTERNAL MEDICINE

## 2023-10-11 PROCEDURE — 63710000001 INSULIN DETEMIR PER 5 UNITS: Performed by: INTERNAL MEDICINE

## 2023-10-11 PROCEDURE — 63710000001 INSULIN LISPRO (HUMAN) PER 5 UNITS: Performed by: INTERNAL MEDICINE

## 2023-10-11 PROCEDURE — 82948 REAGENT STRIP/BLOOD GLUCOSE: CPT

## 2023-10-11 PROCEDURE — 85025 COMPLETE CBC W/AUTO DIFF WBC: CPT | Performed by: INTERNAL MEDICINE

## 2023-10-11 PROCEDURE — 83735 ASSAY OF MAGNESIUM: CPT | Performed by: INTERNAL MEDICINE

## 2023-10-11 PROCEDURE — 99232 SBSQ HOSP IP/OBS MODERATE 35: CPT | Performed by: INTERNAL MEDICINE

## 2023-10-11 PROCEDURE — 97164 PT RE-EVAL EST PLAN CARE: CPT

## 2023-10-11 PROCEDURE — 85007 BL SMEAR W/DIFF WBC COUNT: CPT | Performed by: INTERNAL MEDICINE

## 2023-10-11 RX ADMIN — FAMOTIDINE 40 MG: 20 TABLET, FILM COATED ORAL at 09:11

## 2023-10-11 RX ADMIN — INSULIN LISPRO 5 UNITS: 100 INJECTION, SOLUTION INTRAVENOUS; SUBCUTANEOUS at 09:10

## 2023-10-11 RX ADMIN — INSULIN LISPRO 8 UNITS: 100 INJECTION, SOLUTION INTRAVENOUS; SUBCUTANEOUS at 22:08

## 2023-10-11 RX ADMIN — IBUPROFEN 400 MG: 400 TABLET, FILM COATED ORAL at 09:55

## 2023-10-11 RX ADMIN — IBUPROFEN 400 MG: 400 TABLET, FILM COATED ORAL at 15:55

## 2023-10-11 RX ADMIN — APIXABAN 5 MG: 5 TABLET, FILM COATED ORAL at 22:09

## 2023-10-11 RX ADMIN — TRAMADOL HYDROCHLORIDE 50 MG: 50 TABLET, COATED ORAL at 06:57

## 2023-10-11 RX ADMIN — TRAMADOL HYDROCHLORIDE 50 MG: 50 TABLET, COATED ORAL at 00:37

## 2023-10-11 RX ADMIN — HYDROCODONE BITARTRATE AND ACETAMINOPHEN 1 TABLET: 7.5; 325 TABLET ORAL at 15:55

## 2023-10-11 RX ADMIN — PREGABALIN 50 MG: 25 CAPSULE ORAL at 09:11

## 2023-10-11 RX ADMIN — ATORVASTATIN CALCIUM 10 MG: 10 TABLET, FILM COATED ORAL at 22:11

## 2023-10-11 RX ADMIN — INSULIN LISPRO 5 UNITS: 100 INJECTION, SOLUTION INTRAVENOUS; SUBCUTANEOUS at 17:46

## 2023-10-11 RX ADMIN — MICONAZOLE NITRATE 1 APPLICATION: 2 POWDER TOPICAL at 09:12

## 2023-10-11 RX ADMIN — IBUPROFEN 400 MG: 400 TABLET, FILM COATED ORAL at 22:42

## 2023-10-11 RX ADMIN — HYDROCODONE BITARTRATE AND ACETAMINOPHEN 1 TABLET: 7.5; 325 TABLET ORAL at 09:55

## 2023-10-11 RX ADMIN — HYDROCODONE BITARTRATE AND ACETAMINOPHEN 1 TABLET: 7.5; 325 TABLET ORAL at 03:33

## 2023-10-11 RX ADMIN — TRAMADOL HYDROCHLORIDE 50 MG: 50 TABLET, COATED ORAL at 13:07

## 2023-10-11 RX ADMIN — MICONAZOLE NITRATE 1 APPLICATION: 2 POWDER TOPICAL at 22:10

## 2023-10-11 RX ADMIN — PREGABALIN 50 MG: 25 CAPSULE ORAL at 22:09

## 2023-10-11 RX ADMIN — DIPHENOXYLATE HYDROCHLORIDE AND ATROPINE SULFATE 1 TABLET: 2.5; .025 TABLET ORAL at 09:11

## 2023-10-11 RX ADMIN — APIXABAN 5 MG: 5 TABLET, FILM COATED ORAL at 09:11

## 2023-10-11 RX ADMIN — PREGABALIN 50 MG: 25 CAPSULE ORAL at 15:55

## 2023-10-11 RX ADMIN — INSULIN DETEMIR 45 UNITS: 100 INJECTION, SOLUTION SUBCUTANEOUS at 22:08

## 2023-10-11 RX ADMIN — INSULIN LISPRO 5 UNITS: 100 INJECTION, SOLUTION INTRAVENOUS; SUBCUTANEOUS at 13:07

## 2023-10-11 RX ADMIN — EMPAGLIFLOZIN 10 MG: 10 TABLET, FILM COATED ORAL at 09:11

## 2023-10-11 RX ADMIN — INSULIN DETEMIR 45 UNITS: 100 INJECTION, SOLUTION SUBCUTANEOUS at 09:10

## 2023-10-11 RX ADMIN — CYANOCOBALAMIN TAB 500 MCG 1000 MCG: 500 TAB at 09:11

## 2023-10-11 RX ADMIN — Medication: at 09:12

## 2023-10-11 RX ADMIN — TRAMADOL HYDROCHLORIDE 50 MG: 50 TABLET, COATED ORAL at 19:06

## 2023-10-11 RX ADMIN — IBUPROFEN 400 MG: 400 TABLET, FILM COATED ORAL at 00:40

## 2023-10-11 RX ADMIN — LISINOPRIL 2.5 MG: 2.5 TABLET ORAL at 09:11

## 2023-10-11 RX ADMIN — BACLOFEN 10 MG: 10 TABLET ORAL at 07:48

## 2023-10-11 RX ADMIN — INSULIN LISPRO 4 UNITS: 100 INJECTION, SOLUTION INTRAVENOUS; SUBCUTANEOUS at 13:07

## 2023-10-11 RX ADMIN — BACLOFEN 10 MG: 10 TABLET ORAL at 15:55

## 2023-10-11 NOTE — THERAPY RE-EVALUATION
Acute Care - Physical Therapy Re-Evaluation  KEO Dominguez     Patient Name: Jose Shaikh  : 1960  MRN: [id number]  Today's Date: 10/11/2023      Visit Dx:     ICD-10-CM ICD-9-CM   1. Generalized weakness  R53.1 780.79   2. Hyponatremia  E87.1 276.1   3. Difficulty in walking  R26.2 719.7   4. Decreased activities of daily living (ADL)  Z78.9 V49.89   5. Difficulty walking  R26.2 719.7     Patient Active Problem List   Diagnosis    Diabetic ulcer of left heel associated with type 2 DM    Acute osteomyelitis of left calcaneus     Diabetic ulcer of left heel associated with type 2 DM    Diabetic ulcer of right midfoot associated with type 2 DM    Paroxysmal atrial fibrillation    Essential hypertension    Hyperlipidemia LDL goal <100    Cellulitis and abscess of foot    High alkaline phosphatase level    Osteomyelitis    Onychomycosis    Onychocryptosis    Foot pain, bilateral    Osteomyelitis of foot, right, acute    Cellulitis of right foot    Type 2 diabetes mellitus, with long-term current use of insulin    Class 3 severe obesity due to excess calories with serious comorbidity and body mass index (BMI) of 45.0 to 49.9 in adult    Anxiety disorder, unspecified    Claustrophobia    Dependence on wheelchair    Depression, unspecified    Long term (current) use of anticoagulants    Long term (current) use of oral hypoglycemic drugs    Wound of foot    Non-prs chronic ulcer oth prt r foot limited to brkdwn skin    Orthostatic hypotension    Other chronic osteomyelitis, right ankle and foot    Personal history of nicotine dependence    Thrombocytopenia, unspecified    Unspecified open wound, right foot, initial encounter    Diabetic foot infection    Subacute osteomyelitis of right foot    Right foot pain    Sepsis    Onychomycosis    Foot pain, left    Impaired mobility and ADLs    Absence of toe of right foot    Corns and callosity    Disability of walking    Fracture    Limb swelling    Polyneuropathy     Pressure ulcer, stage 1    Shortness of breath    Generalized weakness     Past Medical History:   Diagnosis Date    Absence of toe of right foot     Acute osteomyelitis of left calcaneus  8/18/2021    Anxiety and depression     Arthritis     Claustrophobia     Corns and callus     Diabetic ulcer of left heel associated with type 2 DM 8/18/2021    Diabetic ulcer of left heel associated with type 2 DM 7/6/2021    Diabetic ulcer of right midfoot associated with type 2 DM 8/18/2021    Difficulty walking     Essential hypertension 8/31/2021    Hammertoe     Hyperlipidemia LDL goal <100 8/31/2021    Ingrown toenail     Obesity     Paroxysmal atrial fibrillation 8/31/2021    Polyneuropathy     Pressure ulcer, stage 1     Tinea unguium     Type 2 diabetes mellitus with polyneuropathy      Past Surgical History:   Procedure Laterality Date    CYST REMOVAL      center of back; benign    INCISION AND DRAINAGE ABSCESS      back    INCISION AND DRAINAGE LEG Right 12/10/2021    Procedure: INCISION AND DRAINAGE LOWER EXTREMITY;  Surgeon: Ash Leyva DPM;  Location: [facility];  Service: Podiatry;  Laterality: Right;    OTHER SURGICAL HISTORY      Surgical clips left foot    TOE SURGERY Right     Removal of 5th toe    TRANS METATARSAL AMPUTATION Right 12/2/2021    Procedure: AMPUTATION TRANS METATARSAL;  Surgeon: Ash Leyva DPM;  Location: [facility] OR;  Service: Podiatry;  Laterality: Right;    WRIST SURGERY Left     repair of injury     PT Assessment (last 12 hours)       PT Evaluation and Treatment       Row Name 10/11/23 1100          Physical Therapy Time and Intention    Subjective Information no complaints (P)   -AM     Document Type re-evaluation (P)   -AM     Mode of Treatment individual therapy;physical therapy (P)   -AM     Patient Effort good (P)   -AM     Symptoms Noted During/After Treatment increased pain (P)   -AM       Row Name 10/11/23 1100          General Information    Patient  Profile Reviewed yes (P)   -AM     Patient Observations alert;cooperative;agree to therapy (P)   -AM       Row Name 10/11/23 1100          Cognition    Orientation Status (Cognition) oriented x 4 (P)   -AM       Row Name 10/11/23 1100          Range of Motion (ROM)    Range of Motion bilateral lower extremities;ROM is WFL (P)   -AM       Row Name 10/11/23 1100          Strength (Manual Muscle Testing)    Strength (Manual Muscle Testing) bilateral lower extremities (P)   L hip and knee strength 3+/5 and painful. Rest 4/5  -AM       Row Name 10/11/23 1100          Bed Mobility    Bed Mobility supine-sit;sit-supine (P)   -AM     Supine-Sit [place] (Bed Mobility) minimum assist (75% patient effort) (P)   -AM     Sit-Supine [place] (Bed Mobility) moderate assist (50% patient effort) (P)   -AM     Assistive Device (Bed Mobility) bed rails;overhead trapeze (P)   -AM       Row Name 10/11/23 1100          Transfers    Transfers sit-stand transfer;stand-sit transfer (P)   -AM       Row Name 10/11/23 1100          Sit-Stand Transfer    Sit-Stand [place] (Transfers) maximum assist (25% patient effort) (P)   -AM     Assistive Device (Sit-Stand Transfers) walker, front-wheeled (P)   -AM       Row Name 10/11/23 1100          Stand-Sit Transfer    Stand-Sit [place] (Transfers) maximum assist (25% patient effort) (P)   -AM     Assistive Device (Stand-Sit Transfers) walker, front-wheeled (P)   -AM       Row Name 10/11/23 1100          Gait/Stairs (Locomotion)    Gait/Stairs Locomotion other (see comments) (P)   deferred for safety due to increased pain in L LE and decreased tolerance in static standing  -AM       [facility] 10/11/23 1100          Safety Issues, Functional Mobility    Impairments Affecting Function (Mobility) balance;endurance/activity tolerance;strength (P)   -AM       Row Name 10/11/23 1100          Balance    Balance Assessment standing static balance (P)   -AM     Static Standing Balance moderate  assist (P)   -AM       Row Name             Wound 09/10/23 2345 Right anterior hip MASD (Moisture associated skin damage)    Wound - Properties Group Placement Date: 09/10/23  -BL Placement Time: 2345 -BL Present on Original Admission: Y  -BL Side: Right  -BL Orientation: anterior  -BL Location: hip  -BL Primary Wound Type: MASD  -BL    Retired Wound - Properties Group Placement Date: 09/10/23  -BL Placement Time: 2345 -BL Present on Original Admission: Y  -BL Side: Right  -BL Orientation: anterior  -BL Location: hip  -BL Primary Wound Type: MASD  -BL    Retired Wound - Properties Group Date first assessed: 09/10/23  -BL Time first assessed: 2345 -BL Present on Original Admission: Y  -BL Side: Right  -BL Location: hip  -BL Primary Wound Type: MASD  -BL      Row Name             Wound 12/02/21 Right anterior foot Incision    Wound - Properties Group Placement Date: 12/02/21  -ES Side: Right  -ES Orientation: anterior  -ES Location: foot  -ES Primary Wound Type: Incision  -ES    Retired Wound - Properties Group Placement Date: 12/02/21  -ES Side: Right  -ES Orientation: anterior  -ES Location: foot  -ES Primary Wound Type: Incision  -ES    Retired Wound - Properties Group Date first assessed: 12/02/21  -ES Side: Right  -ES Location: foot  -ES Primary Wound Type: Incision  -ES      Row Name             Wound 09/15/23 Right gluteal    Wound - Properties Group Placement Date: 09/15/23  -NH Side: Right  -NH Location: gluteal  -NH    Retired Wound - Properties Group Placement Date: 09/15/23  -NH Side: Right  -NH Location: gluteal  -NH    Retired Wound - Properties Group Date first assessed: 09/15/23  -NH Side: Right  -NH Location: gluteal  -NH      Row Name 10/11/23 1100          Plan of Care Review    Plan of Care Reviewed With patient (P)   -AM     Progress improving (P)   -AM     Outcome Evaluation Pt presesnts with decreased strength, decreased activity tolerance and decreased stnading balance limiting pt's  independence with functional transfers and ambulation. Pt has made improvements this far due to increased LE muscle strength and decreased assistance with bed mobility, but pt will still benefit from PT services to continue to address impairments and achieve maximum potential with functional mobility. (P)   -AM       Row Name 10/11/23 1100          Therapy Assessment/Plan (PT)    Rehab Potential (PT) good, to achieve stated therapy goals (P)   -AM     Criteria for Skilled Interventions Met (PT) yes;meets criteria (P)   -AM     Therapy Frequency (PT) daily (P)   -AM     Predicted Duration of Therapy Intervention (PT) 10 days (P)   -AM     Problem List (PT) problems related to;balance;mobility;strength (P)   -AM       Row Name 10/11/23 1100          PT Evaluation Complexity    History, PT Evaluation Complexity no personal factors and/or comorbidities (P)   -AM     Examination of Body Systems (PT Eval Complexity) total of 4 or more elements (P)   -AM     Clinical Presentation (PT Evaluation Complexity) stable (P)   -AM     Clinical Decision Making (PT Evaluation Complexity) low complexity (P)   -AM     Overall Complexity (PT Evaluation Complexity) low complexity (P)   -AM       Row Name 10/11/23 1100          Physical Therapy Goals    Bed Mobility Goal Selection (PT) bed mobility, PT goal 1 (P)   -AM     Transfer Goal Selection (PT) transfer, PT goal 1 (P)   -AM     Gait Training Goal Selection (PT) gait training, PT goal 1 (P)   -AM       Row Name 10/11/23 1100          Bed Mobility Goal 1 (PT)    Activity/Assistive Device (Bed Mobility Goal 1, PT) bed mobility activities, all (P)   -AM     Clarke Level/Cues Needed (Bed Mobility Goal 1, PT) independent (P)   -AM     Time Frame (Bed Mobility Goal 1, PT) 10 days (P)   -AM     Progress/Outcomes (Bed Mobility Goal 1, PT) goal not met;continuing progress toward goal (P)   -AM       Row Name 10/11/23 1100          Transfer Goal 1 (PT)    Activity/Assistive Device  (Transfer Goal 1, PT) transfers, all (P)   -AM     [place] Level/Cues Needed (Transfer Goal 1, PT) independent (P)   -AM     Time Frame (Transfer Goal 1, PT) 10 days (P)   -AM     Progress/Outcome (Transfer Goal 1, PT) goal not met;continuing progress toward goal (P)   -AM       Row Name 10/11/23 1100          Gait Training Goal 1 (PT)    Activity/Assistive Device (Gait Training Goal 1, PT) gait (walking locomotion);assistive device use (P)   -AM     [place] Level (Gait Training Goal 1, PT) independent (P)   -AM     Distance (Gait Training Goal 1, PT) 2 (P)   -AM     Time Frame (Gait Training Goal 1, PT) 10 days (P)   -AM     Progress/Outcome (Gait Training Goal 1, PT) goal revised this date (P)   -AM               User Key  (r) = Recorded By, (t) = Taken By, (c) = Cosigned By      Initials Name Provider Type    Sarah Bernstein, RN Registered Nurse    Dottie Foster, RN Registered Nurse    Katlyn Garcia RN Registered Nurse    Kenisha Fleming, PT Student PT Student                    Physical Therapy Education       Title: PT OT SLP Therapies (Done)       Topic: Physical Therapy (Done)       Point: Mobility training (Done)       Learning Progress Summary             Patient Acceptance, E, VU,NR by RASHARD at 9/24/2023 0604    Acceptance, E, VU,DU by  at 9/18/2023 1537    Acceptance, E, VU,DU by  at 9/17/2023 1515    Acceptance, E, VU,DU by  at 9/16/2023 1804    Acceptance, E, VU by AV at 9/13/2023 1059    Acceptance, E,TB, VU by  at 9/12/2023 1308                         Point: Home exercise program (Done)       Learning Progress Summary             Patient Acceptance, E, VU,NR by RASHARD at 9/24/2023 0604    Acceptance, E, VU,DU by  at 9/18/2023 1537    Acceptance, E, VU,DU by  at 9/17/2023 1515    Acceptance, E, VU,DU by  at 9/16/2023 1804    Acceptance, E, VU by AV at 9/13/2023 1059    Acceptance, E,TB, VU by  at 9/12/2023 1308                         Point: Body mechanics  (Done)       Learning Progress Summary             Patient Acceptance, E, VU,NR by RASHARD at 9/24/2023 0604    Acceptance, E, VU,DU by RF at 9/18/2023 1537    Acceptance, E, VU,DU by RF at 9/17/2023 1515    Acceptance, E, VU,DU by RF at 9/16/2023 1804    Acceptance, E, VU by AV at 9/13/2023 1059    Acceptance, E,TB, VU by  at 9/12/2023 1308                         Point: Precautions (Done)       Learning Progress Summary             Patient Acceptance, E, VU,NR by RASHARD at 9/24/2023 0604    Acceptance, E, VU,DU by RF at 9/18/2023 1537    Acceptance, E, VU,DU by RF at 9/17/2023 1515    Acceptance, E, VU,DU by RF at 9/16/2023 1804    Acceptance, E, VU by AV at 9/13/2023 1059    Acceptance, E,TB, VU by  at 9/12/2023 1308                                         User Key       Initials Effective Dates Name Provider Type Discipline    RASHARD 06/16/21 -  Selena Lindquist, RN Registered Nurse Nurse    AV 06/16/21 -  Uvaldo Christine OT Occupational Therapist OT     01/19/22 -  Karl Baker, RN Registered Nurse Nurse     08/16/23 -  Gamal Simmons PT Student PT Student PT                  PT Recommendation and Plan  Anticipated Discharge Disposition (PT): (P) inpatient rehabilitation facility  Planned Therapy Interventions (PT): (P) balance training, bed mobility training, gait training, neuromuscular re-education, strengthening, transfer training  Therapy Frequency (PT): (P) daily  Progress Summary (PT)  Progress Toward Functional Goals (PT): progress toward functional goals is good  Daily Progress Summary (PT): Pt tolerated therapeutic exercises well this date.  Plan of Care Reviewed With: (P) patient  Progress: (P) improving  Outcome Evaluation: (P) Pt presesnts with decreased strength, decreased activity tolerance and decreased stnading balance limiting pt's independence with functional transfers and ambulation. Pt has made improvements this far due to increased LE muscle strength and decreased assistance with bed  mobility, but pt will still benefit from PT services to continue to address impairments and achieve maximum potential with functional mobility.   Outcome Measures       Row Name 10/11/23 1100 10/10/23 1000 10/09/23 1300       How much help from another person do you currently need...    Turning from your back to your side while in flat bed without using bedrails? 3 (P)   -AM 4  -AV (r) ZT (t) AV (c) 4  -MOE (r) AM (t) MOE (c)    Moving from lying on back to sitting on the side of a flat bed without bedrails? 3 (P)   -AM 3  -AV (r) ZT (t) AV (c) 3  -MOE (r) AM (t) MOE (c)    Moving to and from a bed to a chair (including a wheelchair)? 2 (P)   -AM 2  -AV (r) ZT (t) AV (c) 2  -MOE (r) AM (t) MOE (c)    Standing up from a chair using your arms (e.g., wheelchair, bedside chair)? 1 (P)   -AM 1  -AV (r) ZT (t) AV (c) 2  -MOE (r) AM (t) MOE (c)    Climbing 3-5 steps with a railing? 1 (P)   -AM 1  -AV (r) ZT (t) AV (c) 1  -MOE (r) AM (t) MOE (c)    To walk in hospital room? 1 (P)   -AM 1  -AV (r) ZT (t) AV (c) 1  -MOE (r) AM (t) MOE (c)    AM-PAC 6 Clicks Score (PT) 11 (P)   -AM 12  -AV (r) ZT (t) 13  -MOE (r) AM (t)    Highest level of mobility 4 --> Transferred to chair/commode (P)   -AM 4 --> Transferred to chair/commode  -AV (r) ZT (t) 4 --> Transferred to chair/commode  -MOE (r) AM (t)       Functional Assessment    Outcome Measure Options AM-PAC 6 Clicks Basic Mobility (PT) (P)   -AM -- AM-PAC 6 Clicks Basic Mobility (PT)  -MOE (r) AM (t) MOE (c)              User Key  (r) = Recorded By, (t) = Taken By, (c) = Cosigned By      Initials Name Provider Type    Merlin De La O, PT Physical Therapist    Frankie Brunson, PT Physical Therapist    Oscar Ward, PT Student PT Student    AM Kenisha Webb, PT Student PT Student                     Time Calculation:    PT Charges       Row Name 10/11/23 1148             Time Calculation    PT Received On 10/11/23 (P)   -AM      PT Goal Re-Cert Due Date 10/20/23 (P)   -AM          Untimed Charges    PT Eval/Re-eval Minutes 30 (P)   -AM         Total Minutes    Untimed Charges Total Minutes 30 (P)   -AM       Total Minutes 30 (P)   -AM                User Key  (r) = Recorded By, (t) = Taken By, (c) = Cosigned By      Initials Name Provider Type    Kenisha Fleming PT Student PT Student                  Therapy Charges for Today       Code Description Service Date Service Provider Modifiers Qty    [id number] HC PT RE-EVAL ESTABLISHED PLAN 2 10/11/2023 Kenisha Webb PT Student GP 1            PT G-Codes  Outcome Measure Options: (P) AM-PAC 6 Clicks Basic Mobility (PT)  AM-PAC 6 Clicks Score (PT): (P) 11  AM-PAC 6 Clicks Score (OT): 15    Kenisha Webb PT Student  10/11/2023

## 2023-10-11 NOTE — PLAN OF CARE
Goal Outcome Evaluation:  Plan of Care Reviewed With: (P) patient        Progress: (P) improving  Outcome Evaluation: (P) Pt presesnts with decreased strength, decreased activity tolerance and decreased stnading balance limiting pt's independence with functional transfers and ambulation. Pt has made improvements this far due to increased LE muscle strength and decreased assistance with bed mobility, but pt will still benefit from PT services to continue to address impairments and achieve maximum potential with functional mobility.      Anticipated Discharge Disposition (PT): (P) inpatient rehabilitation facility

## 2023-10-11 NOTE — PROGRESS NOTES
[facility]   Hospitalist Progress Note  Date: 10/11/2023  Patient Name: Jose Shaikh  : 1960  MRN: [id number]  Date of admission: 9/10/2023  Consultants:   -Orthopedic Surgery: Dr. Riki Davis    Subjective   Subjective     Chief Complaint: Weakness    Summary:   Jose Shaikh is a 62 y.o. male with multiple medical problems who was discharged from [facility] the day prior to admission and stated he could barely move or get out of his car so EMS was called.  Patient had hip and knee injections in hopes that this would help with his pain and allow him to work better with physical therapy and did some a little bit but he is unable to walk at this time and  is continue to try arrange rehab placement.  During hospitalization patient was evaluated by orthopedic surgery at patient's request for left total knee arthroplasty.  Per orthopedic surgery patient is not a candidate for left total knee arthroplasty due to morbid obesity, diabetes and chronic morbidities.    Interval Followup:   No acute issues overnight.  Pain and muscle spasm medical better controlled with the more frequent baclofen and ibuprofen added.    Objective   Objective     Vitals:   Temp:  [97.2 °F (36.2 °C)-98.1 °F (36.7 °C)] 97.7 °F (36.5 °C)  Heart Rate:  [73-96] 85  Resp:  [18] 18  BP: ()/(51-68) 105/59  Physical Exam   Gen: No acute distress, sitting up in bed  Resp: CTAB, No w/r/r, normal respiratory effort  Card: RRR, No m/r/g  Abd: Soft, Nontender, Nondistended, + bowel sounds    Result Review    Result Review:  I have personally reviewed the results as below and agree with these findings:  [x]  Laboratory: Personally reviewed BMP, CBC, magnesium  CMP          2023    06:47 10/6/2023    06:12 10/11/2023    06:05   CMP   Glucose 133  153  129    BUN 11  18  17    Creatinine 0.51  0.49  0.52    EGFR 113.2  114.6  112.6    Sodium 139  133  133    Potassium 4.0  4.0  4.3    Chloride 103  102  102     Calcium 9.0  8.8  8.9    Total Protein 6.5      Albumin 3.3      Globulin 3.2      Total Bilirubin 1.1      Alkaline Phosphatase 196      AST (SGOT) 59      ALT (SGPT) 54      Albumin/Globulin Ratio 1.0      BUN/Creatinine Ratio 21.6  36.7  32.7    Anion Gap 7.7  9.0  5.9      CBC          9/28/2023    06:47 10/6/2023    06:12 10/11/2023    06:05   CBC   WBC 5.77  7.77  5.58    RBC 4.79  4.91  4.54    Hemoglobin 12.0  12.3  11.6    Hematocrit 39.1  40.2  37.1    MCV 81.6  81.9  81.7    MCH 25.1  25.1  25.6    MCHC 30.7  30.6  31.3    RDW 17.7  17.5  17.4    Platelets 97  111  105    Blood glucose well controlled.  []  Microbiology:   []  Radiology:   []  EKG/Telemetry:    []  Cardiology/Vascular:    []  Pathology:  []  Old records:  []  Other:    Assessment & Plan   Assessment / Plan     Assessment/plan:  Generalized weakness  Type 2 diabetes mellitus  Essential hypertension  Chronic paroxysmal atrial fibrillation on Eliquis  Obesity (BMI: 42.88)  Debility with wheelchair dependence  Severe degenerative joint disease of lower extremities  Chronic wound  Thrombocytopenia    Continue to monitor in the hospital for management of the above  Continue baclofen to 3 times daily as needed  Continue as needed Norco and tramadol  Continue ibuprofen every 4 hours as needed  Continue bowel regimen  Continue to Jardiance and lisinopril  Continue current insulin regimen  PT/OT consulted and following.  Placement has been difficult due to a variety of barriers  Lab holiday tomorrow     Discussed case with: Bedside RN, social work    DVT prophylaxis:  Medical DVT prophylaxis orders are present.    CODE STATUS:   Code Status (Patient has no pulse and is not breathing): CPR (Attempt to Resuscitate)  Medical Interventions (Patient has pulse or is breathing): Full Support

## 2023-10-11 NOTE — PLAN OF CARE
Goal Outcome Evaluation:  Plan of Care Reviewed With: patient        Progress: no change  Outcome Evaluation: c/o pain/discomfort/spasms in hips/legs, prn pain mgmt provided per orders. wound/skin care done per orders. continues with double protein on meal trays and states improvement from before, still orders out food to be delivered at times. no new issues/needs noted at this time.

## 2023-10-12 LAB
GLUCOSE BLDC GLUCOMTR-MCNC: 134 MG/DL (ref 70–99)
GLUCOSE BLDC GLUCOMTR-MCNC: 151 MG/DL (ref 70–99)
GLUCOSE BLDC GLUCOMTR-MCNC: 184 MG/DL (ref 70–99)
GLUCOSE BLDC GLUCOMTR-MCNC: 222 MG/DL (ref 70–99)

## 2023-10-12 PROCEDURE — 63710000001 INSULIN DETEMIR PER 5 UNITS: Performed by: INTERNAL MEDICINE

## 2023-10-12 PROCEDURE — 97110 THERAPEUTIC EXERCISES: CPT

## 2023-10-12 PROCEDURE — 99232 SBSQ HOSP IP/OBS MODERATE 35: CPT | Performed by: INTERNAL MEDICINE

## 2023-10-12 PROCEDURE — 63710000001 INSULIN LISPRO (HUMAN) PER 5 UNITS: Performed by: INTERNAL MEDICINE

## 2023-10-12 PROCEDURE — 82948 REAGENT STRIP/BLOOD GLUCOSE: CPT

## 2023-10-12 RX ORDER — DIAPER,BRIEF,INFANT-TODD,DISP
1 EACH MISCELLANEOUS 2 TIMES DAILY
Status: DISCONTINUED | OUTPATIENT
Start: 2023-10-12 | End: 2023-11-06 | Stop reason: HOSPADM

## 2023-10-12 RX ADMIN — IBUPROFEN 400 MG: 400 TABLET, FILM COATED ORAL at 21:09

## 2023-10-12 RX ADMIN — PREGABALIN 50 MG: 25 CAPSULE ORAL at 21:09

## 2023-10-12 RX ADMIN — BACITRACIN 0.9 G: 500 OINTMENT TOPICAL at 20:13

## 2023-10-12 RX ADMIN — INSULIN LISPRO 5 UNITS: 100 INJECTION, SOLUTION INTRAVENOUS; SUBCUTANEOUS at 09:07

## 2023-10-12 RX ADMIN — INSULIN LISPRO 4 UNITS: 100 INJECTION, SOLUTION INTRAVENOUS; SUBCUTANEOUS at 17:30

## 2023-10-12 RX ADMIN — INSULIN DETEMIR 45 UNITS: 100 INJECTION, SOLUTION SUBCUTANEOUS at 21:09

## 2023-10-12 RX ADMIN — MICONAZOLE NITRATE 1 APPLICATION: 2 POWDER TOPICAL at 20:13

## 2023-10-12 RX ADMIN — IBUPROFEN 400 MG: 400 TABLET, FILM COATED ORAL at 15:13

## 2023-10-12 RX ADMIN — APIXABAN 5 MG: 5 TABLET, FILM COATED ORAL at 21:09

## 2023-10-12 RX ADMIN — INSULIN LISPRO 5 UNITS: 100 INJECTION, SOLUTION INTRAVENOUS; SUBCUTANEOUS at 12:42

## 2023-10-12 RX ADMIN — TRAMADOL HYDROCHLORIDE 50 MG: 50 TABLET, COATED ORAL at 09:06

## 2023-10-12 RX ADMIN — TRAMADOL HYDROCHLORIDE 50 MG: 50 TABLET, COATED ORAL at 01:16

## 2023-10-12 RX ADMIN — HYDROCODONE BITARTRATE AND ACETAMINOPHEN 1 TABLET: 7.5; 325 TABLET ORAL at 21:09

## 2023-10-12 RX ADMIN — INSULIN LISPRO 5 UNITS: 100 INJECTION, SOLUTION INTRAVENOUS; SUBCUTANEOUS at 17:30

## 2023-10-12 RX ADMIN — BACLOFEN 10 MG: 10 TABLET ORAL at 09:06

## 2023-10-12 RX ADMIN — EMPAGLIFLOZIN 10 MG: 10 TABLET, FILM COATED ORAL at 09:06

## 2023-10-12 RX ADMIN — Medication: at 09:05

## 2023-10-12 RX ADMIN — HYDROCODONE BITARTRATE AND ACETAMINOPHEN 1 TABLET: 7.5; 325 TABLET ORAL at 00:27

## 2023-10-12 RX ADMIN — INSULIN LISPRO 8 UNITS: 100 INJECTION, SOLUTION INTRAVENOUS; SUBCUTANEOUS at 21:09

## 2023-10-12 RX ADMIN — INSULIN LISPRO 4 UNITS: 100 INJECTION, SOLUTION INTRAVENOUS; SUBCUTANEOUS at 09:05

## 2023-10-12 RX ADMIN — BACLOFEN 10 MG: 10 TABLET ORAL at 00:27

## 2023-10-12 RX ADMIN — ATORVASTATIN CALCIUM 10 MG: 10 TABLET, FILM COATED ORAL at 21:09

## 2023-10-12 RX ADMIN — PREGABALIN 50 MG: 25 CAPSULE ORAL at 15:13

## 2023-10-12 RX ADMIN — LISINOPRIL 2.5 MG: 2.5 TABLET ORAL at 09:06

## 2023-10-12 RX ADMIN — HYDROCODONE BITARTRATE AND ACETAMINOPHEN 1 TABLET: 7.5; 325 TABLET ORAL at 12:42

## 2023-10-12 RX ADMIN — CYANOCOBALAMIN TAB 500 MCG 1000 MCG: 500 TAB at 09:06

## 2023-10-12 RX ADMIN — IBUPROFEN 400 MG: 400 TABLET, FILM COATED ORAL at 09:06

## 2023-10-12 RX ADMIN — FAMOTIDINE 40 MG: 20 TABLET, FILM COATED ORAL at 09:06

## 2023-10-12 RX ADMIN — MICONAZOLE NITRATE 1 APPLICATION: 2 POWDER TOPICAL at 09:05

## 2023-10-12 RX ADMIN — TRAMADOL HYDROCHLORIDE 50 MG: 50 TABLET, COATED ORAL at 15:13

## 2023-10-12 RX ADMIN — BACLOFEN 10 MG: 10 TABLET ORAL at 16:56

## 2023-10-12 RX ADMIN — HYDROCODONE BITARTRATE AND ACETAMINOPHEN 1 TABLET: 7.5; 325 TABLET ORAL at 06:37

## 2023-10-12 RX ADMIN — PREGABALIN 50 MG: 25 CAPSULE ORAL at 09:06

## 2023-10-12 RX ADMIN — APIXABAN 5 MG: 5 TABLET, FILM COATED ORAL at 09:06

## 2023-10-12 RX ADMIN — INSULIN DETEMIR 45 UNITS: 100 INJECTION, SOLUTION SUBCUTANEOUS at 09:05

## 2023-10-12 NOTE — PLAN OF CARE
Goal Outcome Evaluation:  Plan of Care Reviewed With: patient        Progress: no change  Outcome Evaluation: prn meds for pain given at patient requests. activity encouraged. skin care per orders, wocrn seen patient and adjusted wound care. no new issues/needs noted at this time.

## 2023-10-12 NOTE — CONSULTS
"Nutrition Services    Patient Name: Jose Shaikh  YOB: 1960  MRN: [id number]  Admission date: 9/10/2023      CLINICAL NUTRITION ASSESSMENT      Reason for Assessment  Follow-up protocol     H&P:    Past Medical History:   Diagnosis Date    Absence of toe of right foot     Acute osteomyelitis of left calcaneus  8/18/2021    Anxiety and depression     Arthritis     Claustrophobia     Corns and callus     Diabetic ulcer of left heel associated with type 2 DM 8/18/2021    Diabetic ulcer of left heel associated with type 2 DM 7/6/2021    Diabetic ulcer of right midfoot associated with type 2 DM 8/18/2021    Difficulty walking     Essential hypertension 8/31/2021    Hammertoe     Hyperlipidemia LDL goal <100 8/31/2021    Ingrown toenail     Obesity     Paroxysmal atrial fibrillation 8/31/2021    Polyneuropathy     Pressure ulcer, stage 1     Tinea unguium     Type 2 diabetes mellitus with polyneuropathy         Current Problems:   Active Hospital Problems    Diagnosis     **Generalized weakness     Type 2 diabetes mellitus, with long-term current use of insulin     Class 3 severe obesity due to excess calories with serious comorbidity and body mass index (BMI) of 45.0 to 49.9 in adult     Dependence on wheelchair     Foot pain, bilateral     Paroxysmal atrial fibrillation     Essential hypertension     Diabetic ulcer of left heel associated with type 2 DM         Nutrition/Diet History         Narrative     62-year-old male admitted for generalized weakness.  Status post amputation of toes, to right foot, following osteomyelitis.      Nutrition follow up.  Patient continues to consume 100% of most meals.  No nutrition interventions indicated at this time.       Anthropometrics        Current Height, Weight Height: 188 cm (74\")  Weight: (!) 151 kg (334 lb)   Current BMI Body mass index is 42.88 kg/m².       Weight Hx  Wt Readings from Last 30 Encounters:   09/11/23 0030 (!) 151 kg (334 lb)   09/10/23 1844 " (!) 154 kg (338 lb 10 oz)   08/30/23 1112 (!) 157 kg (347 lb)   08/01/23 0932 (!) 158 kg (347 lb 10.7 oz)   07/31/23 2347 (!) 163 kg (358 lb 7.5 oz)   06/16/23 2333 (!) 155 kg (342 lb 2.5 oz)   06/16/23 1053 (!) 155 kg (342 lb 3.2 oz)   06/15/23 0505 (!) 149 kg (328 lb 14.4 oz)   06/14/23 0455 (!) 149 kg (328 lb 4.8 oz)   06/13/23 1703 (!) 149 kg (328 lb 11.2 oz)   06/13/23 0600 (!) 170 kg (374 lb 12.5 oz)   06/12/23 0420 (!) 160 kg (352 lb 11.8 oz)   06/11/23 0447 (!) 150 kg (331 lb 5.6 oz)   06/10/23 0500 (!) 150 kg (330 lb 14.6 oz)   06/09/23 0536 (!) 156 kg (343 lb 7.6 oz)   06/08/23 1826 (!) 156 kg (344 lb 11.2 oz)   06/08/23 0522 (!) 158 kg (348 lb 8.8 oz)   06/07/23 0548 (!) 155 kg (342 lb 9.5 oz)   06/06/23 0515 (!) 155 kg (341 lb 14.9 oz)   06/05/23 0445 (!) 155 kg (341 lb 9.6 oz)   06/05/23 0348 (!) 158 kg (349 lb 3.3 oz)   06/04/23 0555 (!) 157 kg (346 lb 3.2 oz)   06/03/23 0539 (!) 158 kg (349 lb)   06/02/23 0548 (!) 163 kg (359 lb 3.2 oz)   06/01/23 0600 (!) 164 kg (362 lb)   05/31/23 0507 (!) 164 kg (362 lb 4.8 oz)   05/30/23 0635 (!) 164 kg (362 lb 7 oz)   05/29/23 0500 (!) 167 kg (368 lb 2.7 oz)   05/28/23 0600 (!) 167 kg (367 lb 11.6 oz)   05/27/23 0600 (!) 167 kg (369 lb 0.8 oz)   05/26/23 0529 (!) 167 kg (369 lb 3.2 oz)   05/25/23 0600 (!) 168 kg (370 lb 3.2 oz)   05/24/23 0600 (!) 166 kg (366 lb 9.6 oz)   05/22/23 0529 (!) 169 kg (373 lb 7.4 oz)   05/21/23 0600 (!) 169 kg (371 lb 12.8 oz)   05/20/23 0600 (!) 171 kg (376 lb 5.2 oz)   05/19/23 0300 (!) 168 kg (370 lb 14.4 oz)   05/18/23 1912 (!) 168 kg (371 lb 7.6 oz)   05/18/23 0600 (!) 169 kg (371 lb 11.1 oz)   05/16/23 0700 (!) 171 kg (377 lb 4.8 oz)   05/14/23 0500 (!) 171 kg (377 lb 3.3 oz)   05/12/23 1143 (!) 170 kg (375 lb)   05/06/23 0258 (!) 170 kg (375 lb 8 oz)   04/19/23 0909 (!) 163 kg (359 lb)   04/03/23 1906 (!) 168 kg (370 lb)   03/27/23 0938 (!) 170 kg (373 lb 10.9 oz)   03/17/23 1153 (!) 168 kg (370 lb)   01/27/23 1501 (!) 168 kg  (370 lb)   12/22/22 1501 (!) 171 kg (376 lb)   11/08/22 1035 (!) 161 kg (355 lb)   10/01/22 1141 (!) 164 kg (360 lb 10.8 oz)   05/18/22 1311 (!) 155 kg (341 lb)   03/24/22 1432 (!) 155 kg (341 lb)   03/02/22 1412 (!) 155 kg (341 lb)   01/12/22 1317 (!) 155 kg (341 lb)   12/30/21 1431 (!) 155 kg (341 lb)   12/01/21 1144 (!) 155 kg (341 lb 11.4 oz)   12/01/21 0843 (!) 157 kg (346 lb)   11/22/21 0839 (!) 157 kg (346 lb)   11/15/21 1148 (!) 157 kg (346 lb 12.5 oz)   11/09/21 1139 (!) 157 kg (345 lb 7.4 oz)   11/05/21 1130 (!) 159 kg (351 lb 3.1 oz)   11/02/21 1121 (!) 161 kg (356 lb)   10/27/21 1048 (!) 161 kg (356 lb)   10/21/21 1418 (!) 162 kg (356 lb 14.8 oz)   10/20/21 1120 (!) 160 kg (353 lb)            Wt Change Observation Trending down, -27L fluid balance this admission.     Estimated/Assessed Needs       Energy Requirements Estimated nutrient needs using adj body weight 99.4 kg   EST Needs (kcal/day) 25 kcals per kilogram = 2485 aracelis       Protein Requirements    EST Daily Needs (g/day) 1.0 to 1.2 g/kg =  g of protein       Fluid Requirements     Estimated Needs (mL/day) 25 mL/kg = 2485 mL (11 to 12 cups)     Labs/Medications         Pertinent Labs Reviewed.   Results from last 7 days   Lab Units 10/11/23  0605 10/06/23  0612   SODIUM mmol/L 133* 133*   POTASSIUM mmol/L 4.3 4.0   CHLORIDE mmol/L 102 102   CO2 mmol/L 25.1 22.0   BUN mg/dL 17 18   CREATININE mg/dL 0.52* 0.49*   CALCIUM mg/dL 8.9 8.8   GLUCOSE mg/dL 129* 153*     Results from last 7 days   Lab Units 10/11/23  0605 10/06/23  0612   MAGNESIUM mg/dL 2.1 2.0   HEMOGLOBIN g/dL 11.6* 12.3*   HEMATOCRIT % 37.1* 40.2     COVID19   Date Value Ref Range Status   05/12/2023 Not Detected Not Detected - Ref. Range Final     Lab Results   Component Value Date    HGBA1C 6.60 (H) 04/19/2023         Pertinent Medications Reviewed.     Current Nutrition Orders & Evaluation of Intake       Oral Nutrition     Current PO Diet Diet: Diabetic Diets, High Protein  Diet; Consistent Carbohydrate; Texture: Regular Texture (IDDSI 7); Fluid Consistency: Thin (IDDSI 0)   Supplement No active supplement orders       Malnutrition Severity Assessment                Nutrition Diagnosis         Nutrition Dx Problem 1 Increased nutrient needs related to increased nutrient needs due to catabolic disease as evidenced by  Non healing foot ulcer.       Nutrition Intervention         Consistent Carbohydrate Diet, regular texture, thin liquids  High protein diet.     Medical Nutrition Therapy/Nutrition Education          Learner     Readiness Patient  Acceptance     Method     Response Explanation  Verbalizes understanding     Monitor/Evaluation        Monitor Per protocol, PO intake, Weight, Skin status, POC/GOC       Nutrition Discharge Plan         To be determined       Electronically signed by:  Kacey Montoya RD  10/12/23 12:32 EDT

## 2023-10-12 NOTE — SIGNIFICANT NOTE
Wound Eval / Progress Noted    KEO Dominguez     Patient Name: Jose Shaikh  : 1960  MRN: [id number]  Today's Date: 10/12/2023                 Admit Date: 9/10/2023    Visit Dx:    ICD-10-CM ICD-9-CM   1. Generalized weakness  R53.1 780.79   2. Hyponatremia  E87.1 276.1   3. Difficulty in walking  R26.2 719.7   4. Decreased activities of daily living (ADL)  Z78.9 V49.89   5. Difficulty walking  R26.2 719.7         Generalized weakness    Diabetic ulcer of left heel associated with type 2 DM    Paroxysmal atrial fibrillation    Essential hypertension    Foot pain, bilateral    Type 2 diabetes mellitus, with long-term current use of insulin    Class 3 severe obesity due to excess calories with serious comorbidity and body mass index (BMI) of 45.0 to 49.9 in adult    Dependence on wheelchair        Past Medical History:   Diagnosis Date    Absence of toe of right foot     Acute osteomyelitis of left calcaneus  2021    Anxiety and depression     Arthritis     Claustrophobia     Corns and callus     Diabetic ulcer of left heel associated with type 2 DM 2021    Diabetic ulcer of left heel associated with type 2 DM 2021    Diabetic ulcer of right midfoot associated with type 2 DM 2021    Difficulty walking     Essential hypertension 2021    Hammertoe     Hyperlipidemia LDL goal <100 2021    Ingrown toenail     Obesity     Paroxysmal atrial fibrillation 2021    Polyneuropathy     Pressure ulcer, stage 1     Tinea unguium     Type 2 diabetes mellitus with polyneuropathy         Past Surgical History:   Procedure Laterality Date    CYST REMOVAL      center of back; benign    INCISION AND DRAINAGE ABSCESS      back    INCISION AND DRAINAGE LEG Right 12/10/2021    Procedure: INCISION AND DRAINAGE LOWER EXTREMITY;  Surgeon: Ash Leyva DPM;  Location: [facility] MAIN OR;  Service: Podiatry;  Laterality: Right;    OTHER SURGICAL HISTORY      Surgical clips left foot    TOE SURGERY  Right     Removal of 5th toe    TRANS METATARSAL AMPUTATION Right 12/2/2021    Procedure: AMPUTATION TRANS METATARSAL;  Surgeon: Ash Leyva DPM;  Location: [facility] MAIN OR;  Service: Podiatry;  Laterality: Right;    WRIST SURGERY Left     repair of injury         Physical Assessment:  Wound 12/02/21 Right anterior foot Incision (Active)   Wound Image   10/12/23 1015   Dressing Appearance dry;intact 10/12/23 1015   Closure None 10/12/23 1015   Base dry;red;scab 10/12/23 1015   Periwound dry;intact 10/12/23 1015   Periwound Temperature warm 10/12/23 1015   Periwound Skin Turgor soft 10/12/23 1015   Edges rolled/closed 10/12/23 1015   Drainage Amount none 10/12/23 1015   Care, Wound cleansed with;sterile normal saline 10/12/23 1015   Dressing Care open to air 10/12/23 1015      Wound Check / Follow-up:  Patient seen today for wound follow-up and dressing change. Right distal foot remains with dry, scabbed tissue. Cleansed with normal saline and gauze. Recommending to switch to topical treatment with application of bacitracin ointment to aid in moisture donation. Skin folds are dry at this time. Recommending to continue current skin care. Bilateral gluteal aspects with pink, blanchable intact tissue. No open areas noted at this time. Applied barrier cream. Recommending to continue current skin protection with application of barrier cream. Recommending to continue every two hour turns, offload heels, and keep patient free from moisture/moisture managed.        Impression: Chronic wound to right foot.       Short term goals: Regain skin integrity, skin protection, pressure reduction, moisture prevention/management, daily dressing changes, topical treatment, skin care.           Dottie Guevara RN    10/12/2023    15:52 EDT

## 2023-10-12 NOTE — PLAN OF CARE
Goal Outcome Evaluation:  Plan of Care Reviewed With: patient        Progress: no change  Outcome Evaluation: patient is alert and oriented x4 and on room air. c/o of pain and spasms in patients hips/legs throughout shift. prn pain medications given per mar. wound and skin care done per orders. blood glucose monitored. no new issues or concerns at this time.

## 2023-10-12 NOTE — PROGRESS NOTES
[facility]   Hospitalist Progress Note  Date: 10/12/2023  Patient Name: Jose Shaikh  : 1960  MRN: [id number]  Date of admission: 9/10/2023  Consultants:   -Orthopedic Surgery: Dr. Riki Davis    Subjective   Subjective     Chief Complaint: Weakness    Summary:   Jose Shaikh is a 62 y.o. male with multiple medical problems who was discharged from [facility] [place] the day prior to admission and stated he could barely move or get out of his car so EMS was called.  Patient had hip and knee injections in hopes that this would help with his pain and allow him to work better with physical therapy and did some a little bit but he is unable to walk at this time and  is continue to try arrange rehab placement.  During hospitalization patient was evaluated by orthopedic surgery at patient's request for left total knee arthroplasty.  Per orthopedic surgery patient is not a candidate for left total knee arthroplasty due to morbid obesity, diabetes and chronic morbidities.    Interval Followup:   No acute issues overnight.  Was requesting baclofen be scheduled.  Still having intermittent pain and spasms but states it is better controlled today.    Objective   Objective     Vitals:   Temp:  [97.5 °F (36.4 °C)-98.2 °F (36.8 °C)] 98.2 °F (36.8 °C)  Heart Rate:  [] 81  Resp:  [16-20] 16  BP: ()/(51-74) 110/68  Physical Exam   Gen: No acute distress, sitting up in bed  Resp: CTAB, no w/r/r, normal respiratory effort  Card: RRR, no MRG   Abd: Soft, Nontender, Nondistended, + bowel sounds    Result Review    Result Review:  I have personally reviewed the results as below and agree with these findings:  [x]  Laboratory:   CMP          2023    06:47 10/6/2023    06:12 10/11/2023    06:05   CMP   Glucose 133  153  129    BUN 11  18  17    Creatinine 0.51  0.49  0.52    EGFR 113.2  114.6  112.6    Sodium 139  133  133    Potassium 4.0  4.0  4.3    Chloride 103  102  102    Calcium 9.0  8.8   8.9    Total Protein 6.5      Albumin 3.3      Globulin 3.2      Total Bilirubin 1.1      Alkaline Phosphatase 196      AST (SGOT) 59      ALT (SGPT) 54      Albumin/Globulin Ratio 1.0      BUN/Creatinine Ratio 21.6  36.7  32.7    Anion Gap 7.7  9.0  5.9      CBC          9/28/2023    06:47 10/6/2023    06:12 10/11/2023    06:05   CBC   WBC 5.77  7.77  5.58    RBC 4.79  4.91  4.54    Hemoglobin 12.0  12.3  11.6    Hematocrit 39.1  40.2  37.1    MCV 81.6  81.9  81.7    MCH 25.1  25.1  25.6    MCHC 30.7  30.6  31.3    RDW 17.7  17.5  17.4    Platelets 97  111  105    Blood glucose well controlled.  []  Microbiology:   []  Radiology:   []  EKG/Telemetry:    []  Cardiology/Vascular:    []  Pathology:  []  Old records:  []  Other:    Assessment & Plan   Assessment / Plan     Assessment/plan:  Generalized weakness  Type 2 diabetes mellitus  Essential hypertension  Chronic paroxysmal atrial fibrillation on Eliquis  Obesity (BMI: 42.88)  Debility with wheelchair dependence  Severe degenerative joint disease of lower extremities  Chronic wound  Thrombocytopenia    Continue to monitor in the hospital for management of the above  Continue baclofen 3 times daily as needed  Continue as needed Norco and tramadol  Continue ibuprofen every 4 hours as needed  Continue bowel regimen  Continue to Jardiance and lisinopril  Continue current insulin regimen  PT/OT consulted and following.  Placement has been difficult due to a variety of barriers  Lab holiday tomorrow     Discussed case with: Bedside RN, social work    DVT prophylaxis:  Medical DVT prophylaxis orders are present.    CODE STATUS:   Code Status (Patient has no pulse and is not breathing): CPR (Attempt to Resuscitate)  Medical Interventions (Patient has pulse or is breathing): Full Support

## 2023-10-12 NOTE — THERAPY TREATMENT NOTE
Acute Care - Physical Therapy Treatment Note  KE[facility] Dominguez     Patient Name: Jose Shaikh  : 1960  MRN: [id number]  Today's Date: 10/12/2023      Visit Dx:     ICD-10-CM ICD-9-CM   1. Generalized weakness  R53.1 780.79   2. Hyponatremia  E87.1 276.1   3. Difficulty in walking  R26.2 719.7   4. Decreased activities of daily living (ADL)  Z78.9 V49.89   5. Difficulty walking  R26.2 719.7     Patient Active Problem List   Diagnosis    Diabetic ulcer of left heel associated with type 2 DM    Acute osteomyelitis of left calcaneus     Diabetic ulcer of left heel associated with type 2 DM    Diabetic ulcer of right midfoot associated with type 2 DM    Paroxysmal atrial fibrillation    Essential hypertension    Hyperlipidemia LDL goal <100    Cellulitis and abscess of foot    High alkaline phosphatase level    Osteomyelitis    Onychomycosis    Onychocryptosis    Foot pain, bilateral    Osteomyelitis of foot, right, acute    Cellulitis of right foot    Type 2 diabetes mellitus, with long-term current use of insulin    Class 3 severe obesity due to excess calories with serious comorbidity and body mass index (BMI) of 45.0 to 49.9 in adult    Anxiety disorder, unspecified    Claustrophobia    Dependence on wheelchair    Depression, unspecified    Long term (current) use of anticoagulants    Long term (current) use of oral hypoglycemic drugs    Wound of foot    Non-prs chronic ulcer oth prt r foot limited to brkdwn skin    Orthostatic hypotension    Other chronic osteomyelitis, right ankle and foot    Personal history of nicotine dependence    Thrombocytopenia, unspecified    Unspecified open wound, right foot, initial encounter    Diabetic foot infection    Subacute osteomyelitis of right foot    Right foot pain    Sepsis    Onychomycosis    Foot pain, left    Impaired mobility and ADLs    Absence of toe of right foot    Corns and callosity    Disability of walking    Fracture    Limb swelling    Polyneuropathy     Pressure ulcer, stage 1    Shortness of breath    Generalized weakness     Past Medical History:   Diagnosis Date    Absence of toe of right foot     Acute osteomyelitis of left calcaneus  8/18/2021    Anxiety and depression     Arthritis     Claustrophobia     Corns and callus     Diabetic ulcer of left heel associated with type 2 DM 8/18/2021    Diabetic ulcer of left heel associated with type 2 DM 7/6/2021    Diabetic ulcer of right midfoot associated with type 2 DM 8/18/2021    Difficulty walking     Essential hypertension 8/31/2021    Hammertoe     Hyperlipidemia LDL goal <100 8/31/2021    Ingrown toenail     Obesity     Paroxysmal atrial fibrillation 8/31/2021    Polyneuropathy     Pressure ulcer, stage 1     Tinea unguium     Type 2 diabetes mellitus with polyneuropathy      Past Surgical History:   Procedure Laterality Date    CYST REMOVAL      center of back; benign    INCISION AND DRAINAGE ABSCESS      back    INCISION AND DRAINAGE LEG Right 12/10/2021    Procedure: INCISION AND DRAINAGE LOWER EXTREMITY;  Surgeon: Ash Leyva DPM;  Location: [facility];  Service: Podiatry;  Laterality: Right;    OTHER SURGICAL HISTORY      Surgical clips left foot    TOE SURGERY Right     Removal of 5th toe    TRANS METATARSAL AMPUTATION Right 12/2/2021    Procedure: AMPUTATION TRANS METATARSAL;  Surgeon: Ash Leyva DPM;  Location: [facility] OR;  Service: Podiatry;  Laterality: Right;    WRIST SURGERY Left     repair of injury     PT Assessment (last 12 hours)       PT Evaluation and Treatment       Row Name 10/12/23 1300          Physical Therapy Time and Intention    Subjective Information fatigue;pain (P)   -AM     Document Type therapy note (daily note) (P)   -AM     Mode of Treatment individual therapy;physical therapy (P)   -AM     Patient Effort good (P)   -AM     Symptoms Noted During/After Treatment increased pain (P)   -AM       Row Name 10/12/23 1300          General Information     Patient Observations alert;cooperative;agree to therapy (P)   -AM       Row Name 10/12/23 1300          Bed Mobility    Bed Mobility supine-sit;sit-supine (P)   -AM     Supine-Sit [place] (Bed Mobility) contact guard (P)   -AM     Sit-Supine [place] (Bed Mobility) moderate assist (50% patient effort) (P)   -AM       Row Name 10/12/23 1300          Transfers    Transfers sit-stand transfer;stand-sit transfer (P)   -AM       Row Name 10/12/23 1300          Sit-Stand Transfer    Sit-Stand [place] (Transfers) moderate assist (50% patient effort);2 person assist (P)   -AM     Assistive Device (Sit-Stand Transfers) walker, front-wheeled (P)   -AM       Row Name 10/12/23 1300          Stand-Sit Transfer    Stand-Sit [place] (Transfers) moderate assist (50% patient effort);2 person assist (P)   -AM     Assistive Device (Stand-Sit Transfers) walker, front-wheeled (P)   -AM       Row Name 10/12/23 1300          Gait/Stairs (Locomotion)    Gait/Stairs Locomotion gait/ambulation assistive device (P)   -AM     [place] Level (Gait) moderate assist (50% patient effort);2 person assist (P)   -AM     Assistive Device (Gait) walker, front-wheeled (P)   -AM     Patient was able to Ambulate yes (P)   -AM     Distance in Feet (Gait) -- (P)   pt took 1 step forward, limited by L hip pain  -AM       Row Name 10/12/23 1300          Balance    Balance Assessment standing dynamic balance (P)   -AM     Dynamic Standing Balance moderate assist;2-person assist (P)   -AM       Row Name 10/12/23 1300          Motor Skills    Therapeutic Exercise hip;knee;ankle (P)   -AM       Row Name 10/12/23 1300          Hip (Therapeutic Exercise)    Hip AROM (Therapeutic Exercise) bilateral;flexion;aBduction;aDduction;10 repetitions (P)   -AM       Row Name 10/12/23 1300          Knee (Therapeutic Exercise)    Knee Strengthening (Therapeutic Exercise) bilateral;heel slides;10 repetitions (P)   -AM       Row Name 10/12/23 1300           Ankle (Therapeutic Exercise)    Ankle (Therapeutic Exercise) AROM (active range of motion) (P)   -AM     Ankle AROM (Therapeutic Exercise) bilateral;dorsiflexion;plantarflexion;15 repititions (P)   -AM       Row Name             Wound 09/10/23 2345 Right anterior hip MASD (Moisture associated skin damage)    Wound - Properties Group Placement Date: 09/10/23  -BL Placement Time: 2345  -BL Present on Original Admission: Y  -BL Side: Right  -BL Orientation: anterior  -BL Location: hip  -BL Primary Wound Type: MASD  -BL    Retired Wound - Properties Group Placement Date: 09/10/23  -BL Placement Time: 2345  -BL Present on Original Admission: Y  -BL Side: Right  -BL Orientation: anterior  -BL Location: hip  -BL Primary Wound Type: MASD  -BL    Retired Wound - Properties Group Date first assessed: 09/10/23  -BL Time first assessed: 2345  -BL Present on Original Admission: Y  -BL Side: Right  -BL Location: hip  -BL Primary Wound Type: MASD  -BL      Row Name             Wound 12/02/21 Right anterior foot Incision    Wound - Properties Group Placement Date: 12/02/21  -ES Side: Right  -ES Orientation: anterior  -ES Location: foot  -ES Primary Wound Type: Incision  -ES    Retired Wound - Properties Group Placement Date: 12/02/21  -ES Side: Right  -ES Orientation: anterior  -ES Location: foot  -ES Primary Wound Type: Incision  -ES    Retired Wound - Properties Group Date first assessed: 12/02/21  -ES Side: Right  -ES Location: foot  -ES Primary Wound Type: Incision  -ES      Row Name             Wound 09/15/23 Right gluteal    Wound - Properties Group Placement Date: 09/15/23  -NH Side: Right  -NH Location: gluteal  -NH    Retired Wound - Properties Group Placement Date: 09/15/23  -NH Side: Right  -NH Location: gluteal  -NH    Retired Wound - Properties Group Date first assessed: 09/15/23  -NH Side: Right  -NH Location: gluteal  -NH      Row Name 10/12/23 1300          Progress Summary (PT)    Progress Toward Functional Goals  (PT) progress toward functional goals is good (P)   -AM               User Key  (r) = Recorded By, (t) = Taken By, (c) = Cosigned By      Initials Name Provider Type    Sarah Bernstein, RN Registered Nurse    Dottie Foster, RN Registered Nurse    Katlyn Garcia, RN Registered Nurse    Kenisha Fleming, PT Student PT Student                    Physical Therapy Education       Title: PT OT SLP Therapies (Done)       Topic: Physical Therapy (Done)       Point: Mobility training (Done)       Learning Progress Summary             Patient Acceptance, E, VU,NR by RASHARD at 9/24/2023 0604    Acceptance, E, VU,DU by RF at 9/18/2023 1537    Acceptance, E, VU,DU by RF at 9/17/2023 1515    Acceptance, E, VU,DU by RF at 9/16/2023 1804    Acceptance, E, VU by AV at 9/13/2023 1059    Acceptance, E,TB, VU by  at 9/12/2023 1308                         Point: Home exercise program (Done)       Learning Progress Summary             Patient Acceptance, E, VU,NR by RASHARD at 9/24/2023 0604    Acceptance, E, VU,DU by RF at 9/18/2023 1537    Acceptance, E, VU,DU by RF at 9/17/2023 1515    Acceptance, E, VU,DU by RF at 9/16/2023 1804    Acceptance, E, VU by AV at 9/13/2023 1059    Acceptance, E,TB, VU by  at 9/12/2023 1308                         Point: Body mechanics (Done)       Learning Progress Summary             Patient Acceptance, E, VU,NR by RASHARD at 9/24/2023 0604    Acceptance, E, VU,DU by RF at 9/18/2023 1537    Acceptance, E, VU,DU by RF at 9/17/2023 1515    Acceptance, E, VU,DU by RF at 9/16/2023 1804    Acceptance, E, VU by AV at 9/13/2023 1059    Acceptance, E,TB, VU by  at 9/12/2023 1308                         Point: Precautions (Done)       Learning Progress Summary             Patient Acceptance, E, VU,NR by RASHARD at 9/24/2023 0604    Acceptance, E, VU,DU by RF at 9/18/2023 1537    Acceptance, E, VU,DU by RF at 9/17/2023 1515    Acceptance, E, VU,DU by RF at 9/16/2023 1804    Acceptance, E, VU by AV at  9/13/2023 1059    Acceptance, E,TB, VU by  at 9/12/2023 1308                                         User Key       Initials Effective Dates Name Provider Type Discipline     06/16/21 -  Selena Lindquist, RONNIE Registered Nurse Nurse    AV 06/16/21 -  Uvaldo Christine OT Occupational Therapist OT    RF 01/19/22 -  Karl Baker, RN Registered Nurse Nurse     08/16/23 -  Gamal Simmons, MERLIN Student PT Student PT                  PT Recommendation and Plan  Anticipated Discharge Disposition (PT): inpatient rehabilitation facility  Planned Therapy Interventions (PT): balance training, bed mobility training, gait training, neuromuscular re-education, strengthening, transfer training  Therapy Frequency (PT): daily  Progress Summary (PT)  Progress Toward Functional Goals (PT): (P) progress toward functional goals is good  Daily Progress Summary (PT): Pt tolerated therapeutic exercises well this date.  Plan of Care Reviewed With: patient  Progress: improving  Outcome Evaluation: Pt presesnts with decreased strength, decreased activity tolerance and decreased stnading balance limiting pt's independence with functional transfers and ambulation. Pt has made improvements this far due to increased LE muscle strength and decreased assistance with bed mobility, but pt will still benefit from PT services to continue to address impairments and achieve maximum potential with functional mobility.   Outcome Measures       Row Name 10/12/23 1300 10/11/23 1100 10/10/23 1000       How much help from another person do you currently need...    Turning from your back to your side while in flat bed without using bedrails? 3 (P)   -AM 3  -MOE (r) AM (t) MOE (c) 4  -AV (r) ZT (t) AV (c)    Moving from lying on back to sitting on the side of a flat bed without bedrails? 3 (P)   -AM 3  -MOE (r) AM (t) MOE (c) 3  -AV (r) ZT (t) AV (c)    Moving to and from a bed to a chair (including a wheelchair)? 2 (P)   -AM 2  -MOE (r) AM (t) MOE (c) 2  -AV  (r) ZT (t) AV (c)    Standing up from a chair using your arms (e.g., wheelchair, bedside chair)? 2 (P)   -AM 1  -MOE (r) AM (t) MOE (c) 1  -AV (r) ZT (t) AV (c)    Climbing 3-5 steps with a railing? 1 (P)   -AM 1  -MOE (r) AM (t) MOE (c) 1  -AV (r) ZT (t) AV (c)    To walk in hospital room? 1 (P)   -AM 1  -MOE (r) AM (t) MOE (c) 1  -AV (r) ZT (t) AV (c)    AM-PAC 6 Clicks Score (PT) 12 (P)   -AM 11  -MOE (r) AM (t) 12  -AV (r) ZT (t)    Highest level of mobility 4 --> Transferred to chair/commode (P)   -AM 4 --> Transferred to chair/commode  -MOE (r) AM (t) 4 --> Transferred to chair/commode  -AV (r) ZT (t)       Functional Assessment    Outcome Measure Options AM-PAC 6 Clicks Basic Mobility (PT) (P)   -AM AM-PAC 6 Clicks Basic Mobility (PT)  -MOE (r) AM (t) MOE (c) --              User Key  (r) = Recorded By, (t) = Taken By, (c) = Cosigned By      Initials Name Provider Type    Merlin De La O, PT Physical Therapist    Frankie Brunson, PT Physical Therapist    Oscar Ward, PT Student PT Student    Kenisha Fleming, PT Student PT Student                     Time Calculation:    PT Charges       Row Name 10/12/23 1319             Time Calculation    PT Received On 10/12/23 (P)   -AM         Timed Charges    59902 - PT Therapeutic Exercise Minutes 6 (P)   -AM      15496 - Gait Training Minutes  4 (P)   -AM      23045 - PT Therapeutic Activity Minutes 5 (P)   -AM         Total Minutes    Timed Charges Total Minutes 15 (P)   -AM       Total Minutes 15 (P)   -AM                User Key  (r) = Recorded By, (t) = Taken By, (c) = Cosigned By      Initials Name Provider Type    Kenisha Fleming, PT Student PT Student                  Therapy Charges for Today       Code Description Service Date Service Provider Modifiers Qty    86669768861 HC PT RE-EVAL ESTABLISHED PLAN 2 10/11/2023 Kenisha Webb, PT Student GP 1    [id number]  PT THER PROC EA 15 MIN 10/12/2023 Kenisha Webb, PT Student GP 1             PT G-Codes  Outcome Measure Options: (P) AM-PAC 6 Clicks Basic Mobility (PT)  AM-PAC 6 Clicks Score (PT): (P) 12  AM-PAC 6 Clicks Score (OT): 15    Kenisha Webb PT Student  10/12/2023

## 2023-10-12 NOTE — PLAN OF CARE
Goal Outcome Evaluation:  Plan of Care Reviewed With: patient        Progress: improving  Outcome Evaluation: Pt presesnts with decreased strength, decreased activity tolerance and decreased stnading balance limiting pt's independence with functional transfers and ambulation. Pt has made improvements this far due to increased LE muscle strength and decreased assistance with bed mobility, but pt will still benefit from PT services to continue to address impairments and achieve maximum potential with functional mobility.      Anticipated Discharge Disposition (PT): inpatient rehabilitation facility   Protopic Pregnancy And Lactation Text: This medication is Pregnancy Category C. It is unknown if this medication is excreted in breast milk when applied topically.

## 2023-10-13 LAB
GLUCOSE BLDC GLUCOMTR-MCNC: 139 MG/DL (ref 70–99)
GLUCOSE BLDC GLUCOMTR-MCNC: 163 MG/DL (ref 70–99)
GLUCOSE BLDC GLUCOMTR-MCNC: 167 MG/DL (ref 70–99)
GLUCOSE BLDC GLUCOMTR-MCNC: 272 MG/DL (ref 70–99)

## 2023-10-13 PROCEDURE — 63710000001 INSULIN LISPRO (HUMAN) PER 5 UNITS: Performed by: INTERNAL MEDICINE

## 2023-10-13 PROCEDURE — 97110 THERAPEUTIC EXERCISES: CPT

## 2023-10-13 PROCEDURE — 97168 OT RE-EVAL EST PLAN CARE: CPT

## 2023-10-13 PROCEDURE — 99232 SBSQ HOSP IP/OBS MODERATE 35: CPT | Performed by: INTERNAL MEDICINE

## 2023-10-13 PROCEDURE — 82948 REAGENT STRIP/BLOOD GLUCOSE: CPT

## 2023-10-13 PROCEDURE — 63710000001 INSULIN DETEMIR PER 5 UNITS: Performed by: INTERNAL MEDICINE

## 2023-10-13 RX ORDER — PREGABALIN 75 MG/1
75 CAPSULE ORAL 3 TIMES DAILY
Status: DISCONTINUED | OUTPATIENT
Start: 2023-10-13 | End: 2023-11-06 | Stop reason: HOSPADM

## 2023-10-13 RX ADMIN — TRAMADOL HYDROCHLORIDE 50 MG: 50 TABLET, COATED ORAL at 07:43

## 2023-10-13 RX ADMIN — HYDROCODONE BITARTRATE AND ACETAMINOPHEN 1 TABLET: 7.5; 325 TABLET ORAL at 14:08

## 2023-10-13 RX ADMIN — Medication: at 09:54

## 2023-10-13 RX ADMIN — IBUPROFEN 400 MG: 400 TABLET, FILM COATED ORAL at 08:46

## 2023-10-13 RX ADMIN — INSULIN LISPRO 4 UNITS: 100 INJECTION, SOLUTION INTRAVENOUS; SUBCUTANEOUS at 12:20

## 2023-10-13 RX ADMIN — BACLOFEN 10 MG: 10 TABLET ORAL at 01:32

## 2023-10-13 RX ADMIN — FAMOTIDINE 40 MG: 20 TABLET, FILM COATED ORAL at 08:47

## 2023-10-13 RX ADMIN — INSULIN LISPRO 4 UNITS: 100 INJECTION, SOLUTION INTRAVENOUS; SUBCUTANEOUS at 17:22

## 2023-10-13 RX ADMIN — MICONAZOLE NITRATE 1 APPLICATION: 2 POWDER TOPICAL at 20:44

## 2023-10-13 RX ADMIN — HYDROCODONE BITARTRATE AND ACETAMINOPHEN 1 TABLET: 7.5; 325 TABLET ORAL at 06:43

## 2023-10-13 RX ADMIN — TRAMADOL HYDROCHLORIDE 50 MG: 50 TABLET, COATED ORAL at 16:23

## 2023-10-13 RX ADMIN — IBUPROFEN 400 MG: 400 TABLET, FILM COATED ORAL at 21:15

## 2023-10-13 RX ADMIN — INSULIN LISPRO 5 UNITS: 100 INJECTION, SOLUTION INTRAVENOUS; SUBCUTANEOUS at 17:22

## 2023-10-13 RX ADMIN — LISINOPRIL 2.5 MG: 2.5 TABLET ORAL at 08:44

## 2023-10-13 RX ADMIN — APIXABAN 5 MG: 5 TABLET, FILM COATED ORAL at 20:44

## 2023-10-13 RX ADMIN — INSULIN LISPRO 5 UNITS: 100 INJECTION, SOLUTION INTRAVENOUS; SUBCUTANEOUS at 12:20

## 2023-10-13 RX ADMIN — INSULIN DETEMIR 45 UNITS: 100 INJECTION, SOLUTION SUBCUTANEOUS at 08:44

## 2023-10-13 RX ADMIN — PREGABALIN 75 MG: 75 CAPSULE ORAL at 20:44

## 2023-10-13 RX ADMIN — INSULIN LISPRO 12 UNITS: 100 INJECTION, SOLUTION INTRAVENOUS; SUBCUTANEOUS at 20:44

## 2023-10-13 RX ADMIN — HYDROCODONE BITARTRATE AND ACETAMINOPHEN 1 TABLET: 7.5; 325 TABLET ORAL at 20:44

## 2023-10-13 RX ADMIN — INSULIN LISPRO 5 UNITS: 100 INJECTION, SOLUTION INTRAVENOUS; SUBCUTANEOUS at 08:44

## 2023-10-13 RX ADMIN — APIXABAN 5 MG: 5 TABLET, FILM COATED ORAL at 09:53

## 2023-10-13 RX ADMIN — EMPAGLIFLOZIN 10 MG: 10 TABLET, FILM COATED ORAL at 08:47

## 2023-10-13 RX ADMIN — BACITRACIN 0.9 G: 500 OINTMENT TOPICAL at 20:44

## 2023-10-13 RX ADMIN — MICONAZOLE NITRATE 1 APPLICATION: 2 POWDER TOPICAL at 08:48

## 2023-10-13 RX ADMIN — BACLOFEN 10 MG: 10 TABLET ORAL at 19:13

## 2023-10-13 RX ADMIN — PREGABALIN 50 MG: 25 CAPSULE ORAL at 08:44

## 2023-10-13 RX ADMIN — PREGABALIN 75 MG: 75 CAPSULE ORAL at 16:14

## 2023-10-13 RX ADMIN — ATORVASTATIN CALCIUM 10 MG: 10 TABLET, FILM COATED ORAL at 20:44

## 2023-10-13 RX ADMIN — BACLOFEN 10 MG: 10 TABLET ORAL at 09:53

## 2023-10-13 RX ADMIN — INSULIN DETEMIR 45 UNITS: 100 INJECTION, SOLUTION SUBCUTANEOUS at 20:44

## 2023-10-13 RX ADMIN — CYANOCOBALAMIN TAB 500 MCG 1000 MCG: 500 TAB at 08:46

## 2023-10-13 RX ADMIN — BACITRACIN 0.9 G: 500 OINTMENT TOPICAL at 09:53

## 2023-10-13 NOTE — THERAPY PROGRESS REPORT/RE-CERT
Patient Name: Jose Shaikh  : 1960    MRN: [id number]                              Today's Date: 10/13/2023       Admit Date: 9/10/2023    Visit Dx:     ICD-10-CM ICD-9-CM   1. Generalized weakness  R53.1 780.79   2. Hyponatremia  E87.1 276.1   3. Difficulty in walking  R26.2 719.7   4. Decreased activities of daily living (ADL)  Z78.9 V49.89   5. Difficulty walking  R26.2 719.7     Patient Active Problem List   Diagnosis    Diabetic ulcer of left heel associated with type 2 DM    Acute osteomyelitis of left calcaneus     Diabetic ulcer of left heel associated with type 2 DM    Diabetic ulcer of right midfoot associated with type 2 DM    Paroxysmal atrial fibrillation    Essential hypertension    Hyperlipidemia LDL goal <100    Cellulitis and abscess of foot    High alkaline phosphatase level    Osteomyelitis    Onychomycosis    Onychocryptosis    Foot pain, bilateral    Osteomyelitis of foot, right, acute    Cellulitis of right foot    Type 2 diabetes mellitus, with long-term current use of insulin    Class 3 severe obesity due to excess calories with serious comorbidity and body mass index (BMI) of 45.0 to 49.9 in adult    Anxiety disorder, unspecified    Claustrophobia    Dependence on wheelchair    Depression, unspecified    Long term (current) use of anticoagulants    Long term (current) use of oral hypoglycemic drugs    Wound of foot    Non-prs chronic ulcer oth prt r foot limited to brkdwn skin    Orthostatic hypotension    Other chronic osteomyelitis, right ankle and foot    Personal history of nicotine dependence    Thrombocytopenia, unspecified    Unspecified open wound, right foot, initial encounter    Diabetic foot infection    Subacute osteomyelitis of right foot    Right foot pain    Sepsis    Onychomycosis    Foot pain, left    Impaired mobility and ADLs    Absence of toe of right foot    Corns and callosity    Disability of walking    Fracture    Limb swelling    Polyneuropathy    Pressure  ulcer, stage 1    Shortness of breath    Generalized weakness     Past Medical History:   Diagnosis Date    Absence of toe of right foot     Acute osteomyelitis of left calcaneus  8/18/2021    Anxiety and depression     Arthritis     Claustrophobia     Corns and callus     Diabetic ulcer of left heel associated with type 2 DM 8/18/2021    Diabetic ulcer of left heel associated with type 2 DM 7/6/2021    Diabetic ulcer of right midfoot associated with type 2 DM 8/18/2021    Difficulty walking     Essential hypertension 8/31/2021    Hammertoe     Hyperlipidemia LDL goal <100 8/31/2021    Ingrown toenail     Obesity     Paroxysmal atrial fibrillation 8/31/2021    Polyneuropathy     Pressure ulcer, stage 1     Tinea unguium     Type 2 diabetes mellitus with polyneuropathy      Past Surgical History:   Procedure Laterality Date    CYST REMOVAL      center of back; benign    INCISION AND DRAINAGE ABSCESS      back    INCISION AND DRAINAGE LEG Right 12/10/2021    Procedure: INCISION AND DRAINAGE LOWER EXTREMITY;  Surgeon: Ash Leyva DPM;  Location: [facility] MAIN OR;  Service: Podiatry;  Laterality: Right;    OTHER SURGICAL HISTORY      Surgical clips left foot    TOE SURGERY Right     Removal of 5th toe    TRANS METATARSAL AMPUTATION Right 12/2/2021    Procedure: AMPUTATION TRANS METATARSAL;  Surgeon: Ash Leyva DPM;  Location: [facility] MAIN OR;  Service: Podiatry;  Laterality: Right;    WRIST SURGERY Left     repair of injury      General Information       Row Name 10/13/23 1517          OT Time and Intention    Document Type progress note/recertification  -     Mode of Treatment individual therapy;occupational therapy  -       Row Name 10/13/23 1517          General Information    Patient Profile Reviewed yes  -     Existing Precautions/Restrictions fall  -     Barriers to Rehab previous functional deficit  -       Row Name 10/13/23 1517          Occupational Profile    Reason for  Services/Referral (Occupational Profile) OT re-certification to assess ongoing needs for inpatient therapy services and monitor progress with goals. Pt continues to demonstrate need for inpatient OT services.  -       Row Name 10/13/23 1517          Cognition    Orientation Status (Cognition) oriented x 4;oriented x 3  very motivated and cooperative, follows commands without delay  -       Row Name 10/13/23 1517          Safety Issues, Functional Mobility    Impairments Affecting Function (Mobility) balance;endurance/activity tolerance;strength;pain  -               User Key  (r) = Recorded By, (t) = Taken By, (c) = Cosigned By      Initials Name Provider Type    Denisa Cutler OT Occupational Therapist                     Mobility/ADL's       Row Name 10/13/23 1518          Bed Mobility    Bed Mobility supine-sit;sit-supine  -     Scooting/Bridging [place] (Bed Mobility) dependent (less than 25% patient effort);2 person assist  pt using overhead trapeze; bed does not trendelenburg thus requires 2P total A to scoot due to inability to use BLE to push  -     Supine-Sit [place] (Bed Mobility) contact guard;1 person assist  using overhead trapeze and bed rail with HOB flat  -     Sit-Supine [place] (Bed Mobility) maximum assist (25% patient effort)  able to bring upper body back to bed however, unable to lift BLE back onto bed due to L hip pain  -     Bed Mobility, Safety Issues decreased use of legs for bridging/pushing  -     Assistive Device (Bed Mobility) bed rails;overhead trapeze  -     Comment, (Bed Mobility) CGA to log roll side to side using overhead trapeze and bed rails; CGA for supine>sit; MAX A for sit>supine  -       Row Name 10/13/23 1518          Transfers    Comment, (Transfers) significant L hip pain limiting  -       Row Name 10/13/23 1518          Mobility    Extremity Weight-bearing Status left lower extremity;right lower extremity  -     Left Lower  Extremity (Weight-bearing Status) weight-bearing as tolerated (WBAT)  -               User Key  (r) = Recorded By, (t) = Taken By, (c) = Cosigned By      Initials Name Provider Type    Denisa Cutler OT Occupational Therapist                   Obj/Interventions       Row Name 10/13/23 1520          Motor Skills    Motor Skills functional endurance  -     Functional Endurance fair end on room air, very motivated and vitals stable on room air but limited by L hip pain  -       Row Name 10/13/23 1520          Balance    Balance Assessment sitting static balance;sitting dynamic balance  -     Static Sitting Balance standby assist;1-person assist  -     Dynamic Sitting Balance standby assist;1-person assist  -     Position, Sitting Balance unsupported;sitting edge of bed  -     Comment, Balance Pt tolerated >15 min unsupported dangling at EOB with SBA. Reports increased comfort sitting EOB vs supine in bed.  -               User Key  (r) = Recorded By, (t) = Taken By, (c) = Cosigned By      Initials Name Provider Type    Denisa Cutler OT Occupational Therapist                   Goals/Plan    No documentation.                  Clinical Impression       Row Name 10/13/23 1521          Pain Assessment    Pretreatment Pain Rating 5/10  -     Posttreatment Pain Rating 5/10  -     Pain Location - Side/Orientation Left  -     Pain Location - hip;knee  -     Pain Intervention(s) Repositioned;Rest  -       Row Name 10/13/23 1521          Plan of Care Review    Plan of Care Reviewed With patient  -     Progress improving  improvement in ability to roll in bed as well as sitting balance for EOB dangling activities  -     Outcome Evaluation Patient performance of self-care ADLs/mobility continues to be limited by pain, decreased functional endurance and decreased strength in BLE. Pt very motivated to progress with therapy and perform exercises given by PT. Pt will benefit from 2P for safe  progression of standing tolerance and transfers. Pt will continue to benefit from skilled OT intervention to progress twoards goals. Recommend post acute rehab for safe discharge home.  -       Row Name 10/13/23 1521          Therapy Assessment/Plan (OT)    Patient/Family Therapy Goal Statement (OT) Go to rehab to get stronger or be able to walk in my house  -     Rehab Potential (OT) good, to achieve stated therapy goals  -     Criteria for Skilled Therapeutic Interventions Met (OT) yes;meets criteria;skilled treatment is necessary  -     Therapy Frequency (OT) 5 times/wk  -       Row Name 10/13/23 1521          Therapy Plan Review/Discharge Plan (OT)    Equipment Needs Upon Discharge (OT) overhead trapeze  -     Anticipated Discharge Disposition (OT) inpatient rehabilitation facility;sub acute care setting  -       Row Name 10/13/23 1521          Vital Signs    O2 Delivery Pre Treatment room air  -     Intra SpO2 (%) 94  -     O2 Delivery Intra Treatment room air  -     O2 Delivery Post Treatment room air  -     Pre Patient Position Supine  -     Intra Patient Position Sitting  -     Post Patient Position Supine  -       Row Name 10/13/23 1521          Positioning and Restraints    Pre-Treatment Position in bed  -     Post Treatment Position bed  -     In Bed fowlers;call light within reach;encouraged to call for assist  -               User Key  (r) = Recorded By, (t) = Taken By, (c) = Cosigned By      Initials Name Provider Type    Denisa Cutler, OT Occupational Therapist                   Outcome Measures       Row Name 10/13/23 1524          How much help from another is currently needed...    Putting on and taking off regular lower body clothing? 1  -     Bathing (including washing, rinsing, and drying) 2  -     Toileting (which includes using toilet bed pan or urinal) 2  SET UP for urinal  -     Putting on and taking off regular upper body clothing 3  -JM      Taking care of personal grooming (such as brushing teeth) 3  -JM     Eating meals 4  -JM     AM-PAC 6 Clicks Score (OT) 15  -       Row Name 10/13/23 0900          How much help from another person do you currently need...    Turning from your back to your side while in flat bed without using bedrails? 3  -JN     Moving from lying on back to sitting on the side of a flat bed without bedrails? 3  -JN     Moving to and from a bed to a chair (including a wheelchair)? 2  -JN     Standing up from a chair using your arms (e.g., wheelchair, bedside chair)? 2  -JN     Climbing 3-5 steps with a railing? 1  -JN     To walk in hospital room? 1  -JN     AM-PAC 6 Clicks Score (PT) 12  -JN     Highest level of mobility 4 --> Transferred to chair/commode  -       Row Name 10/13/23 1524          Functional Assessment    Outcome Measure Options AM-PAC 6 Clicks Daily Activity (OT);Optimal Instrument  -       Row Name 10/13/23 1524          Optimal Instrument    Optimal Instrument Optimal - 3  -JM     Bending/Stooping 5  -JM     Standing 5  -JM     Reaching 1  -JM               User Key  (r) = Recorded By, (t) = Taken By, (c) = Cosigned By      Initials Name Provider Type    Julieth Walton, RN Registered Nurse    Denisa Cutler OT Occupational Therapist                    Occupational Therapy Education       Title: PT OT SLP Therapies (Done)       Topic: Occupational Therapy (Done)       Point: ADL training (Done)       Description:   Instruct learner(s) on proper safety adaptation and remediation techniques during self care or transfers.   Instruct in proper use of assistive devices.                  Learning Progress Summary             Patient Acceptance, E, VU,NR by RASHARD at 9/24/2023 0604    Acceptance, E, VU,DU by ISA at 9/18/2023 1537    Acceptance, E, VU,DU by RF at 9/17/2023 1515    Acceptance, E, VU,DU by RF at 9/16/2023 1804    Acceptance, E, VU by BRIANNA at 9/13/2023 1059                         Point: Home exercise  program (Done)       Description:   Instruct learner(s) on appropriate technique for monitoring, assisting and/or progressing therapeutic exercises/activities.                  Learning Progress Summary             Patient Acceptance, E, VU,NR by  at 9/24/2023 0604    Acceptance, E, VU,DU by RF at 9/18/2023 1537    Acceptance, E, VU,DU by RF at 9/17/2023 1515    Acceptance, E, VU,DU by RF at 9/16/2023 1804    Acceptance, E, VU by AV at 9/13/2023 1059                         Point: Precautions (Done)       Description:   Instruct learner(s) on prescribed precautions during self-care and functional transfers.                  Learning Progress Summary             Patient Acceptance, E, VU,NR by  at 9/24/2023 0604    Acceptance, E, VU,DU by  at 9/18/2023 1537    Acceptance, E, VU,DU by RF at 9/17/2023 1515    Acceptance, E, VU,DU by RF at 9/16/2023 1804    Acceptance, E, VU by AV at 9/13/2023 1059                         Point: Body mechanics (Done)       Description:   Instruct learner(s) on proper positioning and spine alignment during self-care, functional mobility activities and/or exercises.                  Learning Progress Summary             Patient Acceptance, E, VU,NR by  at 9/24/2023 0604    Acceptance, E, VU,DU by RF at 9/18/2023 1537    Acceptance, E, VU,DU by RF at 9/17/2023 1515    Acceptance, E, VU,DU by RF at 9/16/2023 1804    Acceptance, E, VU by AV at 9/13/2023 1059                                         User Key       Initials Effective Dates Name Provider Type Discipline    RASHARD 06/16/21 -  Selena Lindquist, RONNIE Registered Nurse Nurse    AV 06/16/21 -  Uvaldo Christine OT Occupational Therapist OT     01/19/22 -  aKrl Baker, RN Registered Nurse Nurse                  OT Recommendation and Plan  Therapy Frequency (OT): 5 times/wk  Plan of Care Review  Plan of Care Reviewed With: patient  Progress: improving (improvement in ability to roll in bed as well as sitting balance for EOB dangling  activities)  Outcome Evaluation: Patient performance of self-care ADLs/mobility continues to be limited by pain, decreased functional endurance and decreased strength in BLE. Pt very motivated to progress with therapy and perform exercises given by PT. Pt will benefit from 2P for safe progression of standing tolerance and transfers. Pt will continue to benefit from skilled OT intervention to progress twoards goals. Recommend post acute rehab for safe discharge home.     Time Calculation:         Time Calculation- OT       Row Name 10/13/23 1524             Time Calculation- OT    OT Received On 10/13/23  -      OT Goal Re-Cert Due Date 10/22/23  -         Timed Charges    99063 - OT Therapeutic Activity Minutes 45  -JM         Untimed Charges    OT Eval/Re-eval Minutes --  -         Total Minutes    Timed Charges Total Minutes 45  -JM      Untimed Charges Total Minutes --  -       Total Minutes 45  -JM                User Key  (r) = Recorded By, (t) = Taken By, (c) = Cosigned By      Initials Name Provider Type    Denisa Cutler OT Occupational Therapist                  Therapy Charges for Today       Code Description Service Date Service Provider Modifiers Qty    [id number]  OT THER PROC EA 15 MIN 10/13/2023 Denisa Chery OT GO 3                 Denisa Chery OT  10/13/2023

## 2023-10-13 NOTE — PLAN OF CARE
Goal Outcome Evaluation:  Plan of Care Reviewed With: patient        Progress: no change  Outcome Evaluation: patient is alert and oriented x4 and on room air. rn pain medications given at patients request for muscle spasms and pain in his hips/legs. skin care done per orders. wound care done per orders. no new issues or concerns at this time.

## 2023-10-13 NOTE — PROGRESS NOTES
Psychiatric   Hospitalist Progress Note  Date: 10/13/2023  Patient Name: Jose Shaikh  : 1960  MRN: [id number]  Date of admission: 9/10/2023  Consultants:   -Orthopedic Surgery: Dr. Riki Davis    Subjective   Subjective     Chief Complaint: Weakness    Summary:   Jose Shaikh is a 62 y.o. male with multiple medical problems who was discharged from [facility] the day prior to admission and stated he could barely move or get out of his car so EMS was called.  Patient had hip and knee injections in hopes that this would help with his pain and allow him to work better with physical therapy and did some a little bit but he is unable to walk at this time and  is continue to try arrange rehab placement.  During hospitalization patient was evaluated by orthopedic surgery at patient's request for left total knee arthroplasty.  Per orthopedic surgery patient is not a candidate for left total knee arthroplasty due to morbid obesity, diabetes and chronic morbidities.    Interval Followup:   No acute issues overnight.  Still having neuropathic pain radiating from his left hip down to his left knee.  Denies chest pain or shortness of air.    Objective   Objective     Vitals:   Temp:  [97.6 °F (36.4 °C)-98.1 °F (36.7 °C)] 97.6 °F (36.4 °C)  Heart Rate:  [55-96] 90  Resp:  [18-20] 20  BP: ()/(47-60) 120/52  Physical Exam   Gen: No acute distress, sitting up in bed  Resp: CTAB, no w/r/r, normal respiratory effort  Card: RRR, no MRG   Abd: Soft, Nontender, Nondistended, + bowel sounds    Result Review    Result Review:  I have personally reviewed the results as below and agree with these findings:  [x]  Laboratory:   CMP          2023    06:47 10/6/2023    06:12 10/11/2023    06:05   CMP   Glucose 133  153  129    BUN 11  18  17    Creatinine 0.51  0.49  0.52    EGFR 113.2  114.6  112.6    Sodium 139  133  133    Potassium 4.0  4.0  4.3    Chloride 103  102  102    Calcium 9.0  8.8  8.9     Total Protein 6.5      Albumin 3.3      Globulin 3.2      Total Bilirubin 1.1      Alkaline Phosphatase 196      AST (SGOT) 59      ALT (SGPT) 54      Albumin/Globulin Ratio 1.0      BUN/Creatinine Ratio 21.6  36.7  32.7    Anion Gap 7.7  9.0  5.9      CBC          9/28/2023    06:47 10/6/2023    06:12 10/11/2023    06:05   CBC   WBC 5.77  7.77  5.58    RBC 4.79  4.91  4.54    Hemoglobin 12.0  12.3  11.6    Hematocrit 39.1  40.2  37.1    MCV 81.6  81.9  81.7    MCH 25.1  25.1  25.6    MCHC 30.7  30.6  31.3    RDW 17.7  17.5  17.4    Platelets 97  111  105    Blood glucose well controlled.  []  Microbiology:   []  Radiology:   []  EKG/Telemetry:    []  Cardiology/Vascular:    []  Pathology:  []  Old records:  []  Other:    Assessment & Plan   Assessment / Plan     Assessment/plan:  Generalized weakness  Type 2 diabetes mellitus  Essential hypertension  Chronic paroxysmal atrial fibrillation on Eliquis  Obesity (BMI: 42.88)  Debility with wheelchair dependence  Severe degenerative joint disease of lower extremities  Chronic wound  Thrombocytopenia    Continue to monitor in the hospital for management of the above  Continue baclofen 3 times daily as needed  Continue as needed Norco and tramadol  Increase Lyrica to 75 mg 3 times daily  Continue ibuprofen every 4 hours as needed  Continue bowel regimen  Continue to Jardiance and lisinopril  Continue current insulin regimen  PT/OT consulted and following.  Placement has been difficult due to a variety of barriers  Lab holiday tomorrow     Discussed case with: Bedside RN, social work    DVT prophylaxis:  Medical DVT prophylaxis orders are present.    CODE STATUS:   Code Status (Patient has no pulse and is not breathing): CPR (Attempt to Resuscitate)  Medical Interventions (Patient has pulse or is breathing): Full Support       show

## 2023-10-13 NOTE — PLAN OF CARE
Goal Outcome Evaluation:  Plan of Care Reviewed With: patient        Progress: improving (improvement in ability to roll in bed as well as sitting balance for EOB dangling activities)  Outcome Evaluation: Patient performance of self-care ADLs/mobility continues to be limited by pain, decreased functional endurance and decreased strength in BLE. Pt very motivated to progress with therapy and perform exercises given by PT. Pt will benefit from 2P for safe progression of standing tolerance and transfers. Pt will continue to benefit from skilled OT intervention to progress twoards goals. Recommend post acute rehab for safe discharge home.      Anticipated Discharge Disposition (OT): inpatient rehabilitation facility, sub acute care setting

## 2023-10-14 LAB
GLUCOSE BLDC GLUCOMTR-MCNC: 129 MG/DL (ref 70–99)
GLUCOSE BLDC GLUCOMTR-MCNC: 166 MG/DL (ref 70–99)
GLUCOSE BLDC GLUCOMTR-MCNC: 181 MG/DL (ref 70–99)
GLUCOSE BLDC GLUCOMTR-MCNC: 243 MG/DL (ref 70–99)

## 2023-10-14 PROCEDURE — 82948 REAGENT STRIP/BLOOD GLUCOSE: CPT

## 2023-10-14 PROCEDURE — 99232 SBSQ HOSP IP/OBS MODERATE 35: CPT | Performed by: INTERNAL MEDICINE

## 2023-10-14 PROCEDURE — 63710000001 INSULIN LISPRO (HUMAN) PER 5 UNITS: Performed by: INTERNAL MEDICINE

## 2023-10-14 PROCEDURE — 63710000001 INSULIN DETEMIR PER 5 UNITS: Performed by: INTERNAL MEDICINE

## 2023-10-14 RX ADMIN — IBUPROFEN 400 MG: 400 TABLET, FILM COATED ORAL at 22:08

## 2023-10-14 RX ADMIN — LISINOPRIL 2.5 MG: 2.5 TABLET ORAL at 09:45

## 2023-10-14 RX ADMIN — INSULIN DETEMIR 45 UNITS: 100 INJECTION, SOLUTION SUBCUTANEOUS at 09:43

## 2023-10-14 RX ADMIN — BACLOFEN 10 MG: 10 TABLET ORAL at 03:17

## 2023-10-14 RX ADMIN — Medication: at 09:49

## 2023-10-14 RX ADMIN — BACITRACIN 0.9 G: 500 OINTMENT TOPICAL at 20:23

## 2023-10-14 RX ADMIN — HYDROCODONE BITARTRATE AND ACETAMINOPHEN 1 TABLET: 7.5; 325 TABLET ORAL at 17:03

## 2023-10-14 RX ADMIN — PREGABALIN 75 MG: 75 CAPSULE ORAL at 20:21

## 2023-10-14 RX ADMIN — INSULIN DETEMIR 45 UNITS: 100 INJECTION, SOLUTION SUBCUTANEOUS at 20:23

## 2023-10-14 RX ADMIN — HYDROCODONE BITARTRATE AND ACETAMINOPHEN 1 TABLET: 7.5; 325 TABLET ORAL at 23:50

## 2023-10-14 RX ADMIN — APIXABAN 5 MG: 5 TABLET, FILM COATED ORAL at 09:45

## 2023-10-14 RX ADMIN — INSULIN LISPRO 8 UNITS: 100 INJECTION, SOLUTION INTRAVENOUS; SUBCUTANEOUS at 20:37

## 2023-10-14 RX ADMIN — FAMOTIDINE 40 MG: 20 TABLET, FILM COATED ORAL at 09:45

## 2023-10-14 RX ADMIN — MICONAZOLE NITRATE 1 APPLICATION: 2 POWDER TOPICAL at 09:49

## 2023-10-14 RX ADMIN — BACLOFEN 10 MG: 10 TABLET ORAL at 20:21

## 2023-10-14 RX ADMIN — BACITRACIN 0.9 G: 500 OINTMENT TOPICAL at 09:49

## 2023-10-14 RX ADMIN — PREGABALIN 75 MG: 75 CAPSULE ORAL at 17:03

## 2023-10-14 RX ADMIN — INSULIN LISPRO 5 UNITS: 100 INJECTION, SOLUTION INTRAVENOUS; SUBCUTANEOUS at 09:43

## 2023-10-14 RX ADMIN — CYANOCOBALAMIN TAB 500 MCG 1000 MCG: 500 TAB at 09:45

## 2023-10-14 RX ADMIN — MICONAZOLE NITRATE 1 APPLICATION: 2 POWDER TOPICAL at 20:21

## 2023-10-14 RX ADMIN — ATORVASTATIN CALCIUM 10 MG: 10 TABLET, FILM COATED ORAL at 20:20

## 2023-10-14 RX ADMIN — PREGABALIN 75 MG: 75 CAPSULE ORAL at 09:54

## 2023-10-14 RX ADMIN — INSULIN LISPRO 5 UNITS: 100 INJECTION, SOLUTION INTRAVENOUS; SUBCUTANEOUS at 17:03

## 2023-10-14 RX ADMIN — INSULIN LISPRO 4 UNITS: 100 INJECTION, SOLUTION INTRAVENOUS; SUBCUTANEOUS at 12:12

## 2023-10-14 RX ADMIN — INSULIN LISPRO 4 UNITS: 100 INJECTION, SOLUTION INTRAVENOUS; SUBCUTANEOUS at 09:43

## 2023-10-14 RX ADMIN — HYDROCODONE BITARTRATE AND ACETAMINOPHEN 1 TABLET: 7.5; 325 TABLET ORAL at 09:42

## 2023-10-14 RX ADMIN — IBUPROFEN 400 MG: 400 TABLET, FILM COATED ORAL at 12:12

## 2023-10-14 RX ADMIN — SIMETHICONE 80 MG: 80 TABLET, CHEWABLE ORAL at 09:42

## 2023-10-14 RX ADMIN — HYDROCODONE BITARTRATE AND ACETAMINOPHEN 1 TABLET: 7.5; 325 TABLET ORAL at 03:17

## 2023-10-14 RX ADMIN — EMPAGLIFLOZIN 10 MG: 10 TABLET, FILM COATED ORAL at 09:46

## 2023-10-14 RX ADMIN — INSULIN LISPRO 5 UNITS: 100 INJECTION, SOLUTION INTRAVENOUS; SUBCUTANEOUS at 12:12

## 2023-10-14 RX ADMIN — BACLOFEN 10 MG: 10 TABLET ORAL at 12:12

## 2023-10-14 RX ADMIN — APIXABAN 5 MG: 5 TABLET, FILM COATED ORAL at 20:20

## 2023-10-14 RX ADMIN — IBUPROFEN 400 MG: 400 TABLET, FILM COATED ORAL at 03:17

## 2023-10-14 NOTE — PROGRESS NOTES
[facility]   Hospitalist Progress Note  Date: 10/14/2023  Patient Name: Jose Shaikh  : 1960  MRN: [id number]  Date of admission: 9/10/2023  Consultants:   -Orthopedic Surgery: Dr. Riki Davis    Subjective   Subjective     Chief Complaint: Weakness    Summary:   Jose Shaikh is a 62 y.o. male with multiple medical problems who was discharged from [facility] the day prior to admission and stated he could barely move or get out of his car so EMS was called.  Patient had hip and knee injections in hopes that this would help with his pain and allow him to work better with physical therapy and did some a little bit but he is unable to walk at this time and  is continue to try arrange rehab placement.  During hospitalization patient was evaluated by orthopedic surgery at patient's request for left total knee arthroplasty.  Per orthopedic surgery patient is not a candidate for left total knee arthroplasty due to morbid obesity, diabetes and chronic morbidities.    Interval Followup:   No acute issues overnight.  Pain seems to be a little bit better after the increased dose of Lyrica.  Does still have significant pain in the left hip and the knee but tolerable.  Otherwise no new complaints.    Objective   Objective     Vitals:   Temp:  [97.6 °F (36.4 °C)-98.2 °F (36.8 °C)] 97.7 °F (36.5 °C)  Heart Rate:  [86-98] 98  Resp:  [18-20] 20  BP: ()/(50-62) 116/57  Physical Exam   Gen: No acute distress, asleep on arrival  Resp: CTAB, no w/r/r, normal respiratory effort  Card: RRR, no MRG   Abd: Obese, soft, Nontender, Nondistended, + bowel sounds    Result Review    Result Review:  I have personally reviewed the results as below and agree with these findings:  [x]  Laboratory:   CMP          2023    06:47 10/6/2023    06:12 10/11/2023    06:05   CMP   Glucose 133  153  129    BUN 11  18  17    Creatinine 0.51  0.49  0.52    EGFR 113.2  114.6  112.6    Sodium 139  133  133     Potassium 4.0  4.0  4.3    Chloride 103  102  102    Calcium 9.0  8.8  8.9    Total Protein 6.5      Albumin 3.3      Globulin 3.2      Total Bilirubin 1.1      Alkaline Phosphatase 196      AST (SGOT) 59      ALT (SGPT) 54      Albumin/Globulin Ratio 1.0      BUN/Creatinine Ratio 21.6  36.7  32.7    Anion Gap 7.7  9.0  5.9      CBC          9/28/2023    06:47 10/6/2023    06:12 10/11/2023    06:05   CBC   WBC 5.77  7.77  5.58    RBC 4.79  4.91  4.54    Hemoglobin 12.0  12.3  11.6    Hematocrit 39.1  40.2  37.1    MCV 81.6  81.9  81.7    MCH 25.1  25.1  25.6    MCHC 30.7  30.6  31.3    RDW 17.7  17.5  17.4    Platelets 97  111  105    Blood glucose well controlled.  []  Microbiology:   []  Radiology:   []  EKG/Telemetry:    []  Cardiology/Vascular:    []  Pathology:  []  Old records:  []  Other:    Assessment & Plan   Assessment / Plan     Assessment/plan:  Generalized weakness  Type 2 diabetes mellitus  Essential hypertension  Chronic paroxysmal atrial fibrillation on Eliquis  Obesity (BMI: 42.88)  Debility with wheelchair dependence  Severe degenerative joint disease of lower extremities  Chronic wound  Thrombocytopenia    Continue to monitor in the hospital for management of the above  Continue baclofen 3 times daily as needed  Continue as needed Norco and tramadol  Continue Lyrica 75 mg 3 times daily  Continue ibuprofen every 4 hours as needed  Continue bowel regimen  Continue to Jardiance and lisinopril  Continue current insulin regimen  PT/OT consulted and following.  Placement has been difficult due to a variety of barriers  Trend renal function and electrolytes with a.m. BMP, magnesium   Trend Hgb and WBC with a.m. CBC     Discussed case with: Bedside RN    DVT prophylaxis:  Medical DVT prophylaxis orders are present.    CODE STATUS:   Code Status (Patient has no pulse and is not breathing): CPR (Attempt to Resuscitate)  Medical Interventions (Patient has pulse or is breathing): Full Support

## 2023-10-14 NOTE — PLAN OF CARE
Goal Outcome Evaluation:  Plan of Care Reviewed With: patient        Progress: no change  Outcome Evaluation: alert and oriented x 4. blood glucose monitored. wound/skin care performed per orders. medicated per orders.

## 2023-10-14 NOTE — PLAN OF CARE
Goal Outcome Evaluation:  Plan of Care Reviewed With: patient        Progress: improving  Outcome Evaluation: A&Ox4. VSS - BP trends soft. C/o pain throughout shift. Medicated per prn medication on mar. No new issues/needs at this time.

## 2023-10-15 LAB
ANION GAP SERPL CALCULATED.3IONS-SCNC: 8.1 MMOL/L (ref 5–15)
BASOPHILS # BLD AUTO: 0.05 10*3/MM3 (ref 0–0.2)
BASOPHILS NFR BLD AUTO: 0.8 % (ref 0–1.5)
BUN SERPL-MCNC: 18 MG/DL (ref 8–23)
BUN/CREAT SERPL: 37.5 (ref 7–25)
CALCIUM SPEC-SCNC: 8.6 MG/DL (ref 8.6–10.5)
CHLORIDE SERPL-SCNC: 105 MMOL/L (ref 98–107)
CO2 SERPL-SCNC: 22.9 MMOL/L (ref 22–29)
CREAT SERPL-MCNC: 0.48 MG/DL (ref 0.76–1.27)
DEPRECATED RDW RBC AUTO: 54.5 FL (ref 37–54)
EGFRCR SERPLBLD CKD-EPI 2021: 115.3 ML/MIN/1.73
EOSINOPHIL # BLD AUTO: 0.09 10*3/MM3 (ref 0–0.4)
EOSINOPHIL NFR BLD AUTO: 1.4 % (ref 0.3–6.2)
ERYTHROCYTE [DISTWIDTH] IN BLOOD BY AUTOMATED COUNT: 18.1 % (ref 12.3–15.4)
GLUCOSE BLDC GLUCOMTR-MCNC: 144 MG/DL (ref 70–99)
GLUCOSE BLDC GLUCOMTR-MCNC: 152 MG/DL (ref 70–99)
GLUCOSE BLDC GLUCOMTR-MCNC: 153 MG/DL (ref 70–99)
GLUCOSE BLDC GLUCOMTR-MCNC: 160 MG/DL (ref 70–99)
GLUCOSE BLDC GLUCOMTR-MCNC: 232 MG/DL (ref 70–99)
GLUCOSE BLDC GLUCOMTR-MCNC: 326 MG/DL (ref 70–99)
GLUCOSE SERPL-MCNC: 143 MG/DL (ref 65–99)
HCT VFR BLD AUTO: 34.8 % (ref 37.5–51)
HGB BLD-MCNC: 10.6 G/DL (ref 13–17.7)
IMM GRANULOCYTES # BLD AUTO: 0.02 10*3/MM3 (ref 0–0.05)
IMM GRANULOCYTES NFR BLD AUTO: 0.3 % (ref 0–0.5)
LYMPHOCYTES # BLD AUTO: 1.63 10*3/MM3 (ref 0.7–3.1)
LYMPHOCYTES NFR BLD AUTO: 25.9 % (ref 19.6–45.3)
MAGNESIUM SERPL-MCNC: 1.8 MG/DL (ref 1.6–2.4)
MCH RBC QN AUTO: 25.3 PG (ref 26.6–33)
MCHC RBC AUTO-ENTMCNC: 30.5 G/DL (ref 31.5–35.7)
MCV RBC AUTO: 83.1 FL (ref 79–97)
MONOCYTES # BLD AUTO: 0.6 10*3/MM3 (ref 0.1–0.9)
MONOCYTES NFR BLD AUTO: 9.5 % (ref 5–12)
NEUTROPHILS NFR BLD AUTO: 3.91 10*3/MM3 (ref 1.7–7)
NEUTROPHILS NFR BLD AUTO: 62.1 % (ref 42.7–76)
NRBC BLD AUTO-RTO: 0 /100 WBC (ref 0–0.2)
PLATELET # BLD AUTO: 110 10*3/MM3 (ref 140–450)
PMV BLD AUTO: 11.6 FL (ref 6–12)
POTASSIUM SERPL-SCNC: 3.9 MMOL/L (ref 3.5–5.2)
RBC # BLD AUTO: 4.19 10*6/MM3 (ref 4.14–5.8)
SODIUM SERPL-SCNC: 136 MMOL/L (ref 136–145)
WBC NRBC COR # BLD: 6.3 10*3/MM3 (ref 3.4–10.8)

## 2023-10-15 PROCEDURE — 85025 COMPLETE CBC W/AUTO DIFF WBC: CPT | Performed by: INTERNAL MEDICINE

## 2023-10-15 PROCEDURE — 63710000001 INSULIN LISPRO (HUMAN) PER 5 UNITS: Performed by: INTERNAL MEDICINE

## 2023-10-15 PROCEDURE — 83735 ASSAY OF MAGNESIUM: CPT | Performed by: INTERNAL MEDICINE

## 2023-10-15 PROCEDURE — 80048 BASIC METABOLIC PNL TOTAL CA: CPT | Performed by: INTERNAL MEDICINE

## 2023-10-15 PROCEDURE — 99232 SBSQ HOSP IP/OBS MODERATE 35: CPT | Performed by: INTERNAL MEDICINE

## 2023-10-15 PROCEDURE — 82948 REAGENT STRIP/BLOOD GLUCOSE: CPT

## 2023-10-15 PROCEDURE — 63710000001 INSULIN DETEMIR PER 5 UNITS: Performed by: INTERNAL MEDICINE

## 2023-10-15 RX ADMIN — CYANOCOBALAMIN TAB 500 MCG 1000 MCG: 500 TAB at 08:18

## 2023-10-15 RX ADMIN — MICONAZOLE NITRATE 1 APPLICATION: 2 POWDER TOPICAL at 21:42

## 2023-10-15 RX ADMIN — INSULIN LISPRO 4 UNITS: 100 INJECTION, SOLUTION INTRAVENOUS; SUBCUTANEOUS at 21:40

## 2023-10-15 RX ADMIN — ATORVASTATIN CALCIUM 10 MG: 10 TABLET, FILM COATED ORAL at 21:40

## 2023-10-15 RX ADMIN — APIXABAN 5 MG: 5 TABLET, FILM COATED ORAL at 21:40

## 2023-10-15 RX ADMIN — IBUPROFEN 400 MG: 400 TABLET, FILM COATED ORAL at 19:54

## 2023-10-15 RX ADMIN — Medication: at 11:57

## 2023-10-15 RX ADMIN — INSULIN LISPRO 8 UNITS: 100 INJECTION, SOLUTION INTRAVENOUS; SUBCUTANEOUS at 11:56

## 2023-10-15 RX ADMIN — PREGABALIN 75 MG: 75 CAPSULE ORAL at 08:18

## 2023-10-15 RX ADMIN — IBUPROFEN 400 MG: 400 TABLET, FILM COATED ORAL at 11:57

## 2023-10-15 RX ADMIN — PREGABALIN 75 MG: 75 CAPSULE ORAL at 21:40

## 2023-10-15 RX ADMIN — SIMETHICONE 80 MG: 80 TABLET, CHEWABLE ORAL at 14:48

## 2023-10-15 RX ADMIN — HYDROCODONE BITARTRATE AND ACETAMINOPHEN 1 TABLET: 7.5; 325 TABLET ORAL at 14:48

## 2023-10-15 RX ADMIN — HYDROCODONE BITARTRATE AND ACETAMINOPHEN 1 TABLET: 7.5; 325 TABLET ORAL at 21:40

## 2023-10-15 RX ADMIN — INSULIN LISPRO 16 UNITS: 100 INJECTION, SOLUTION INTRAVENOUS; SUBCUTANEOUS at 17:11

## 2023-10-15 RX ADMIN — FAMOTIDINE 40 MG: 20 TABLET, FILM COATED ORAL at 08:18

## 2023-10-15 RX ADMIN — BACLOFEN 10 MG: 10 TABLET ORAL at 14:48

## 2023-10-15 RX ADMIN — IBUPROFEN 400 MG: 400 TABLET, FILM COATED ORAL at 04:47

## 2023-10-15 RX ADMIN — HYDROCODONE BITARTRATE AND ACETAMINOPHEN 1 TABLET: 7.5; 325 TABLET ORAL at 08:16

## 2023-10-15 RX ADMIN — BACLOFEN 10 MG: 10 TABLET ORAL at 06:37

## 2023-10-15 RX ADMIN — INSULIN LISPRO 5 UNITS: 100 INJECTION, SOLUTION INTRAVENOUS; SUBCUTANEOUS at 08:17

## 2023-10-15 RX ADMIN — EMPAGLIFLOZIN 10 MG: 10 TABLET, FILM COATED ORAL at 08:18

## 2023-10-15 RX ADMIN — BACITRACIN 0.9 G: 500 OINTMENT TOPICAL at 08:19

## 2023-10-15 RX ADMIN — BACITRACIN 0.9 G: 500 OINTMENT TOPICAL at 22:26

## 2023-10-15 RX ADMIN — LISINOPRIL 2.5 MG: 2.5 TABLET ORAL at 08:18

## 2023-10-15 RX ADMIN — APIXABAN 5 MG: 5 TABLET, FILM COATED ORAL at 08:18

## 2023-10-15 RX ADMIN — INSULIN DETEMIR 45 UNITS: 100 INJECTION, SOLUTION SUBCUTANEOUS at 08:17

## 2023-10-15 RX ADMIN — INSULIN LISPRO 4 UNITS: 100 INJECTION, SOLUTION INTRAVENOUS; SUBCUTANEOUS at 08:17

## 2023-10-15 RX ADMIN — INSULIN LISPRO 5 UNITS: 100 INJECTION, SOLUTION INTRAVENOUS; SUBCUTANEOUS at 11:57

## 2023-10-15 RX ADMIN — PREGABALIN 75 MG: 75 CAPSULE ORAL at 17:10

## 2023-10-15 RX ADMIN — INSULIN DETEMIR 45 UNITS: 100 INJECTION, SOLUTION SUBCUTANEOUS at 21:40

## 2023-10-15 RX ADMIN — INSULIN LISPRO 5 UNITS: 100 INJECTION, SOLUTION INTRAVENOUS; SUBCUTANEOUS at 17:10

## 2023-10-15 RX ADMIN — MICONAZOLE NITRATE 1 APPLICATION: 2 POWDER TOPICAL at 08:18

## 2023-10-15 NOTE — PROGRESS NOTES
[facility]   Hospitalist Progress Note  Date: 10/15/2023  Patient Name: Jose Shaikh  : 1960  MRN: [id number]  Date of admission: 9/10/2023  Consultants:   -Orthopedic Surgery: Dr. Riki Davis    Subjective   Subjective     Chief Complaint: Weakness    Summary:   Jose Shaikh is a 62 y.o. male with multiple medical problems who was discharged from [facility] the day prior to admission and stated he could barely move or get out of his car so EMS was called.  Patient had hip and knee injections in hopes that this would help with his pain and allow him to work better with physical therapy and did some a little bit but he is unable to walk at this time and  is continue to try arrange rehab placement.  During hospitalization patient was evaluated by orthopedic surgery at patient's request for left total knee arthroplasty.  Per orthopedic surgery patient is not a candidate for left total knee arthroplasty due to morbid obesity, diabetes and chronic morbidities.    Interval Followup:   No acute issues overnight.  Placement has continued to be an issue and he states he is willing to go out of state if necessary.  He states he is planning to appeal his discharge if he is discharged home rather than rehab.    Objective   Objective     Vitals:   Temp:  [97.2 °F (36.2 °C)-98.6 °F (37 °C)] 98.2 °F (36.8 °C)  Heart Rate:  [] 109  Resp:  [18-20] 20  BP: (100-124)/(57-71) 103/71  Physical Exam   Gen: No acute distress, sitting up eating breakfast  Resp: CTAB, no w/r/r, normal respiratory effort  Card: RRR, no MRG   Abd: Obese, soft, Nontender, Nondistended, + bowel sounds    Result Review    Result Review:  I have personally reviewed the results as below and agree with these findings:  [x]  Laboratory:   CMP          10/6/2023    06:12 10/11/2023    06:05 10/15/2023    05:00   CMP   Glucose 153  129  143    BUN 18  17  18    Creatinine 0.49  0.52  0.48    EGFR 114.6  112.6  115.3    Sodium  133  133  136    Potassium 4.0  4.3  3.9    Chloride 102  102  105    Calcium 8.8  8.9  8.6    BUN/Creatinine Ratio 36.7  32.7  37.5    Anion Gap 9.0  5.9  8.1      CBC          10/6/2023    06:12 10/11/2023    06:05 10/15/2023    05:00   CBC   WBC 7.77  5.58  6.30    RBC 4.91  4.54  4.19    Hemoglobin 12.3  11.6  10.6    Hematocrit 40.2  37.1  34.8    MCV 81.9  81.7  83.1    MCH 25.1  25.6  25.3    MCHC 30.6  31.3  30.5    RDW 17.5  17.4  18.1    Platelets 111  105  110    Blood glucose well controlled.  []  Microbiology:   []  Radiology:   []  EKG/Telemetry:    []  Cardiology/Vascular:    []  Pathology:  []  Old records:  []  Other:    Assessment & Plan   Assessment / Plan     Assessment/plan:  Generalized weakness  Type 2 diabetes mellitus  Essential hypertension  Chronic paroxysmal atrial fibrillation on Eliquis  Obesity (BMI: 42.88)  Debility with wheelchair dependence  Severe degenerative joint disease of lower extremities  Chronic wound  Thrombocytopenia    Continue to monitor in the hospital for management of the above  Continue baclofen 3 times daily as needed  Continue as needed Norco and tramadol  Continue Lyrica 75 mg 3 times daily  Continue ibuprofen every 4 hours as needed  Continue bowel regimen  Continue Jardiance and lisinopril  Blood sugars acceptable, continue current insulin regimen  PT/OT consulted and following.  Placement has been difficult due to a variety of barriers.  Spoke with social work, they have already sent referrals to facilities outside of [place] and have still not heard back from any of them yet.  Trend renal function and electrolytes with a.m. BMP, magnesium   Trend Hgb and WBC with a.m. CBC     Discussed case with: Bedside RN, social work    DVT prophylaxis:  Medical DVT prophylaxis orders are present.    CODE STATUS:   Code Status (Patient has no pulse and is not breathing): CPR (Attempt to Resuscitate)  Medical Interventions (Patient has pulse or is breathing): Full  Support

## 2023-10-16 LAB
GLUCOSE BLDC GLUCOMTR-MCNC: 113 MG/DL (ref 70–99)
GLUCOSE BLDC GLUCOMTR-MCNC: 132 MG/DL (ref 70–99)
GLUCOSE BLDC GLUCOMTR-MCNC: 173 MG/DL (ref 70–99)
GLUCOSE BLDC GLUCOMTR-MCNC: 173 MG/DL (ref 70–99)

## 2023-10-16 PROCEDURE — 82948 REAGENT STRIP/BLOOD GLUCOSE: CPT

## 2023-10-16 PROCEDURE — 63710000001 INSULIN DETEMIR PER 5 UNITS: Performed by: INTERNAL MEDICINE

## 2023-10-16 PROCEDURE — 99232 SBSQ HOSP IP/OBS MODERATE 35: CPT | Performed by: INTERNAL MEDICINE

## 2023-10-16 PROCEDURE — 63710000001 INSULIN LISPRO (HUMAN) PER 5 UNITS: Performed by: INTERNAL MEDICINE

## 2023-10-16 RX ADMIN — APIXABAN 5 MG: 5 TABLET, FILM COATED ORAL at 09:06

## 2023-10-16 RX ADMIN — BACLOFEN 10 MG: 10 TABLET ORAL at 17:57

## 2023-10-16 RX ADMIN — BACITRACIN 0.9 G: 500 OINTMENT TOPICAL at 20:55

## 2023-10-16 RX ADMIN — INSULIN LISPRO 4 UNITS: 100 INJECTION, SOLUTION INTRAVENOUS; SUBCUTANEOUS at 21:19

## 2023-10-16 RX ADMIN — DIPHENOXYLATE HYDROCHLORIDE AND ATROPINE SULFATE 1 TABLET: 2.5; .025 TABLET ORAL at 02:43

## 2023-10-16 RX ADMIN — INSULIN LISPRO 4 UNITS: 100 INJECTION, SOLUTION INTRAVENOUS; SUBCUTANEOUS at 11:28

## 2023-10-16 RX ADMIN — APIXABAN 5 MG: 5 TABLET, FILM COATED ORAL at 21:19

## 2023-10-16 RX ADMIN — MICONAZOLE NITRATE 1 APPLICATION: 2 POWDER TOPICAL at 20:55

## 2023-10-16 RX ADMIN — SIMETHICONE 80 MG: 80 TABLET, CHEWABLE ORAL at 17:57

## 2023-10-16 RX ADMIN — BACLOFEN 10 MG: 10 TABLET ORAL at 00:21

## 2023-10-16 RX ADMIN — INSULIN LISPRO 5 UNITS: 100 INJECTION, SOLUTION INTRAVENOUS; SUBCUTANEOUS at 17:57

## 2023-10-16 RX ADMIN — BACITRACIN 0.9 G: 500 OINTMENT TOPICAL at 09:11

## 2023-10-16 RX ADMIN — INSULIN DETEMIR 45 UNITS: 100 INJECTION, SOLUTION SUBCUTANEOUS at 21:19

## 2023-10-16 RX ADMIN — INSULIN LISPRO 5 UNITS: 100 INJECTION, SOLUTION INTRAVENOUS; SUBCUTANEOUS at 09:06

## 2023-10-16 RX ADMIN — HYDROCODONE BITARTRATE AND ACETAMINOPHEN 1 TABLET: 7.5; 325 TABLET ORAL at 11:29

## 2023-10-16 RX ADMIN — LISINOPRIL 2.5 MG: 2.5 TABLET ORAL at 09:06

## 2023-10-16 RX ADMIN — EMPAGLIFLOZIN 10 MG: 10 TABLET, FILM COATED ORAL at 09:06

## 2023-10-16 RX ADMIN — IBUPROFEN 400 MG: 400 TABLET, FILM COATED ORAL at 02:43

## 2023-10-16 RX ADMIN — DIPHENOXYLATE HYDROCHLORIDE AND ATROPINE SULFATE 1 TABLET: 2.5; .025 TABLET ORAL at 15:35

## 2023-10-16 RX ADMIN — Medication: at 09:11

## 2023-10-16 RX ADMIN — FAMOTIDINE 40 MG: 20 TABLET, FILM COATED ORAL at 09:06

## 2023-10-16 RX ADMIN — HYDROCODONE BITARTRATE AND ACETAMINOPHEN 1 TABLET: 7.5; 325 TABLET ORAL at 04:24

## 2023-10-16 RX ADMIN — BACLOFEN 10 MG: 10 TABLET ORAL at 09:08

## 2023-10-16 RX ADMIN — HYDROCODONE BITARTRATE AND ACETAMINOPHEN 1 TABLET: 7.5; 325 TABLET ORAL at 17:57

## 2023-10-16 RX ADMIN — IBUPROFEN 400 MG: 400 TABLET, FILM COATED ORAL at 09:08

## 2023-10-16 RX ADMIN — INSULIN LISPRO 5 UNITS: 100 INJECTION, SOLUTION INTRAVENOUS; SUBCUTANEOUS at 11:29

## 2023-10-16 RX ADMIN — CYANOCOBALAMIN TAB 500 MCG 1000 MCG: 500 TAB at 09:06

## 2023-10-16 RX ADMIN — PREGABALIN 75 MG: 75 CAPSULE ORAL at 09:06

## 2023-10-16 RX ADMIN — INSULIN DETEMIR 45 UNITS: 100 INJECTION, SOLUTION SUBCUTANEOUS at 09:06

## 2023-10-16 RX ADMIN — ATORVASTATIN CALCIUM 10 MG: 10 TABLET, FILM COATED ORAL at 22:39

## 2023-10-16 RX ADMIN — PREGABALIN 75 MG: 75 CAPSULE ORAL at 15:35

## 2023-10-16 RX ADMIN — SIMETHICONE 80 MG: 80 TABLET, CHEWABLE ORAL at 09:08

## 2023-10-16 RX ADMIN — PREGABALIN 75 MG: 75 CAPSULE ORAL at 21:19

## 2023-10-16 RX ADMIN — IBUPROFEN 400 MG: 400 TABLET, FILM COATED ORAL at 18:40

## 2023-10-16 RX ADMIN — DIPHENOXYLATE HYDROCHLORIDE AND ATROPINE SULFATE 1 TABLET: 2.5; .025 TABLET ORAL at 09:14

## 2023-10-16 RX ADMIN — DIPHENOXYLATE HYDROCHLORIDE AND ATROPINE SULFATE 1 TABLET: 2.5; .025 TABLET ORAL at 21:19

## 2023-10-16 RX ADMIN — MICONAZOLE NITRATE 1 APPLICATION: 2 POWDER TOPICAL at 09:11

## 2023-10-16 RX ADMIN — TRAMADOL HYDROCHLORIDE 50 MG: 50 TABLET, COATED ORAL at 15:35

## 2023-10-16 NOTE — PLAN OF CARE
Goal Outcome Evaluation:  Plan of Care Reviewed With: patient        Progress: no change  Outcome Evaluation: alert and oriented x 4. wound/skin care performed per orders. blood glucose monitored. medicated per orders.

## 2023-10-16 NOTE — PROGRESS NOTES
[facility]   Hospitalist Progress Note  Date: 10/16/2023  Patient Name: Jose Shaikh  : 1960  MRN: [id number]  Date of admission: 9/10/2023  Consultants:   -Orthopedic Surgery: Dr. Riki Davis    Subjective   Subjective     Chief Complaint: Weakness    Summary:   Jose Shaikh is a 62 y.o. male with multiple medical problems who was discharged from [facility] the day prior to admission and stated he could barely move or get out of his car so EMS was called.  Patient had hip and knee injections in hopes that this would help with his pain and allow him to work better with physical therapy and did some a little bit but he is unable to walk at this time and  is continue to try arrange rehab placement.  During hospitalization patient was evaluated by orthopedic surgery at patient's request for left total knee arthroplasty.  Per orthopedic surgery patient is not a candidate for left total knee arthroplasty due to morbid obesity, diabetes and chronic morbidities.  Patient has been here for a month waiting for placement which has been difficult due to a variety of barriers.    Interval Followup:   No acute events overnight.  Pain has been better controlled.  Slept well last night.    Objective   Objective     Vitals:   Temp:  [97.6 °F (36.4 °C)-98.3 °F (36.8 °C)] 97.6 °F (36.4 °C)  Heart Rate:  [] 93  Resp:  [14-20] 14  BP: (115-139)/(59-65) 117/61  Physical Exam   Gen: No acute distress, resting comfortably in bed on arrival  Resp: CTAB, no w/r/r, normal respiratory effort  Card: RRR, no MRG   Abd: Obese, soft, Nontender, Nondistended, + bowel sounds    Result Review    Result Review:  I have personally reviewed the results as below and agree with these findings:  [x]  Laboratory:   CMP          10/6/2023    06:12 10/11/2023    06:05 10/15/2023    05:00   CMP   Glucose 153  129  143    BUN 18  17  18    Creatinine 0.49  0.52  0.48    EGFR 114.6  112.6  115.3    Sodium 133  133  136     Potassium 4.0  4.3  3.9    Chloride 102  102  105    Calcium 8.8  8.9  8.6    BUN/Creatinine Ratio 36.7  32.7  37.5    Anion Gap 9.0  5.9  8.1      CBC          10/6/2023    06:12 10/11/2023    06:05 10/15/2023    05:00   CBC   WBC 7.77  5.58  6.30    RBC 4.91  4.54  4.19    Hemoglobin 12.3  11.6  10.6    Hematocrit 40.2  37.1  34.8    MCV 81.9  81.7  83.1    MCH 25.1  25.6  25.3    MCHC 30.6  31.3  30.5    RDW 17.5  17.4  18.1    Platelets 111  105  110    Blood glucose well controlled.  []  Microbiology:   []  Radiology:   []  EKG/Telemetry:    []  Cardiology/Vascular:    []  Pathology:  []  Old records:  []  Other:    Assessment & Plan   Assessment / Plan     Assessment/plan:  Generalized weakness  Deconditioning  Type 2 diabetes mellitus  Essential hypertension  Chronic paroxysmal atrial fibrillation on Eliquis  Obesity (BMI: 42.88)  Debility with wheelchair dependence  Severe degenerative joint disease of lower extremities  Chronic wound  Thrombocytopenia    Continue to monitor in the hospital for management of the above  Continue baclofen 3 times daily as needed  Continue as needed Norco and tramadol  Continue Lyrica 75 mg 3 times daily  Continue ibuprofen every 4 hours as needed  Continue bowel regimen  Continue Jardiance and lisinopril  Blood sugars acceptable, continue current insulin regimen  PT/OT consulted and following.  Placement has been difficult due to a variety of barriers.  Spoke with social work, they have already sent referrals to facilities outside of [place] and have still not heard back from any of them yet.  Will consider discharge home with home health if all efforts have been exhausted and patient is unable to be placed later this week  Lab holiday tomorrow     Discussed case with: Bedside RN, social work    DVT prophylaxis:  Medical DVT prophylaxis orders are present.    CODE STATUS:   Code Status (Patient has no pulse and is not breathing): CPR (Attempt to Resuscitate)  Medical  Interventions (Patient has pulse or is breathing): Full Support

## 2023-10-17 LAB
027 TOXIN: NORMAL
C DIFF TOX GENS STL QL NAA+PROBE: NEGATIVE
GLUCOSE BLDC GLUCOMTR-MCNC: 130 MG/DL (ref 70–99)
GLUCOSE BLDC GLUCOMTR-MCNC: 168 MG/DL (ref 70–99)
GLUCOSE BLDC GLUCOMTR-MCNC: 185 MG/DL (ref 70–99)
GLUCOSE BLDC GLUCOMTR-MCNC: 295 MG/DL (ref 70–99)

## 2023-10-17 PROCEDURE — 63710000001 INSULIN LISPRO (HUMAN) PER 5 UNITS: Performed by: INTERNAL MEDICINE

## 2023-10-17 PROCEDURE — 87505 NFCT AGENT DETECTION GI: CPT | Performed by: PHYSICIAN ASSISTANT

## 2023-10-17 PROCEDURE — 99232 SBSQ HOSP IP/OBS MODERATE 35: CPT | Performed by: FAMILY MEDICINE

## 2023-10-17 PROCEDURE — 87493 C DIFF AMPLIFIED PROBE: CPT | Performed by: INTERNAL MEDICINE

## 2023-10-17 PROCEDURE — 97110 THERAPEUTIC EXERCISES: CPT

## 2023-10-17 PROCEDURE — 63710000001 INSULIN DETEMIR PER 5 UNITS: Performed by: INTERNAL MEDICINE

## 2023-10-17 PROCEDURE — 82948 REAGENT STRIP/BLOOD GLUCOSE: CPT

## 2023-10-17 RX ORDER — CHOLESTYRAMINE 4 G/9G
1 POWDER, FOR SUSPENSION ORAL DAILY
Status: DISCONTINUED | OUTPATIENT
Start: 2023-10-17 | End: 2023-10-18

## 2023-10-17 RX ORDER — SACCHAROMYCES BOULARDII 250 MG
250 CAPSULE ORAL 2 TIMES DAILY
Status: DISCONTINUED | OUTPATIENT
Start: 2023-10-17 | End: 2023-11-06 | Stop reason: HOSPADM

## 2023-10-17 RX ORDER — LOPERAMIDE HYDROCHLORIDE 2 MG/1
4 CAPSULE ORAL ONCE
Status: COMPLETED | OUTPATIENT
Start: 2023-10-17 | End: 2023-10-17

## 2023-10-17 RX ADMIN — HYDROCODONE BITARTRATE AND ACETAMINOPHEN 1 TABLET: 7.5; 325 TABLET ORAL at 09:29

## 2023-10-17 RX ADMIN — INSULIN LISPRO 5 UNITS: 100 INJECTION, SOLUTION INTRAVENOUS; SUBCUTANEOUS at 12:01

## 2023-10-17 RX ADMIN — EMPAGLIFLOZIN 10 MG: 10 TABLET, FILM COATED ORAL at 09:29

## 2023-10-17 RX ADMIN — Medication: at 09:36

## 2023-10-17 RX ADMIN — INSULIN DETEMIR 45 UNITS: 100 INJECTION, SOLUTION SUBCUTANEOUS at 09:28

## 2023-10-17 RX ADMIN — HYDROCORTISONE 1 APPLICATION: 1 OINTMENT TOPICAL at 09:30

## 2023-10-17 RX ADMIN — BACLOFEN 10 MG: 10 TABLET ORAL at 03:32

## 2023-10-17 RX ADMIN — LOPERAMIDE HYDROCHLORIDE 4 MG: 2 CAPSULE ORAL at 09:29

## 2023-10-17 RX ADMIN — PREGABALIN 75 MG: 75 CAPSULE ORAL at 09:29

## 2023-10-17 RX ADMIN — INSULIN LISPRO 4 UNITS: 100 INJECTION, SOLUTION INTRAVENOUS; SUBCUTANEOUS at 11:58

## 2023-10-17 RX ADMIN — DIPHENOXYLATE HYDROCHLORIDE AND ATROPINE SULFATE 1 TABLET: 2.5; .025 TABLET ORAL at 03:30

## 2023-10-17 RX ADMIN — MICONAZOLE NITRATE 1 APPLICATION: 2 POWDER TOPICAL at 09:35

## 2023-10-17 RX ADMIN — IBUPROFEN 400 MG: 400 TABLET, FILM COATED ORAL at 18:29

## 2023-10-17 RX ADMIN — SIMETHICONE 80 MG: 80 TABLET, CHEWABLE ORAL at 01:01

## 2023-10-17 RX ADMIN — MICONAZOLE NITRATE 1 APPLICATION: 2 POWDER TOPICAL at 21:33

## 2023-10-17 RX ADMIN — INSULIN LISPRO 5 UNITS: 100 INJECTION, SOLUTION INTRAVENOUS; SUBCUTANEOUS at 17:11

## 2023-10-17 RX ADMIN — CHOLESTYRAMINE 1 PACKET: 4 POWDER, FOR SUSPENSION ORAL at 13:45

## 2023-10-17 RX ADMIN — PREGABALIN 75 MG: 75 CAPSULE ORAL at 16:06

## 2023-10-17 RX ADMIN — CYANOCOBALAMIN TAB 500 MCG 1000 MCG: 500 TAB at 09:29

## 2023-10-17 RX ADMIN — HYDROCODONE BITARTRATE AND ACETAMINOPHEN 1 TABLET: 7.5; 325 TABLET ORAL at 00:49

## 2023-10-17 RX ADMIN — FAMOTIDINE 40 MG: 20 TABLET, FILM COATED ORAL at 09:29

## 2023-10-17 RX ADMIN — INSULIN DETEMIR 45 UNITS: 100 INJECTION, SOLUTION SUBCUTANEOUS at 21:32

## 2023-10-17 RX ADMIN — BACLOFEN 10 MG: 10 TABLET ORAL at 21:32

## 2023-10-17 RX ADMIN — ATORVASTATIN CALCIUM 10 MG: 10 TABLET, FILM COATED ORAL at 21:32

## 2023-10-17 RX ADMIN — INSULIN LISPRO 5 UNITS: 100 INJECTION, SOLUTION INTRAVENOUS; SUBCUTANEOUS at 09:28

## 2023-10-17 RX ADMIN — DIPHENOXYLATE HYDROCHLORIDE AND ATROPINE SULFATE 1 TABLET: 2.5; .025 TABLET ORAL at 21:32

## 2023-10-17 RX ADMIN — APIXABAN 5 MG: 5 TABLET, FILM COATED ORAL at 09:29

## 2023-10-17 RX ADMIN — BACLOFEN 10 MG: 10 TABLET ORAL at 11:58

## 2023-10-17 RX ADMIN — Medication 250 MG: at 13:45

## 2023-10-17 RX ADMIN — INSULIN LISPRO 4 UNITS: 100 INJECTION, SOLUTION INTRAVENOUS; SUBCUTANEOUS at 17:11

## 2023-10-17 RX ADMIN — LISINOPRIL 2.5 MG: 2.5 TABLET ORAL at 09:29

## 2023-10-17 RX ADMIN — SIMETHICONE 80 MG: 80 TABLET, CHEWABLE ORAL at 09:30

## 2023-10-17 RX ADMIN — IBUPROFEN 400 MG: 400 TABLET, FILM COATED ORAL at 11:58

## 2023-10-17 RX ADMIN — Medication 250 MG: at 21:32

## 2023-10-17 RX ADMIN — HYDROCODONE BITARTRATE AND ACETAMINOPHEN 1 TABLET: 7.5; 325 TABLET ORAL at 17:11

## 2023-10-17 RX ADMIN — APIXABAN 5 MG: 5 TABLET, FILM COATED ORAL at 21:32

## 2023-10-17 RX ADMIN — SIMETHICONE 80 MG: 80 TABLET, CHEWABLE ORAL at 17:11

## 2023-10-17 RX ADMIN — PREGABALIN 75 MG: 75 CAPSULE ORAL at 21:32

## 2023-10-17 RX ADMIN — BACITRACIN 0.9 G: 500 OINTMENT TOPICAL at 21:33

## 2023-10-17 RX ADMIN — BACITRACIN 0.9 G: 500 OINTMENT TOPICAL at 09:29

## 2023-10-17 RX ADMIN — INSULIN LISPRO 12 UNITS: 100 INJECTION, SOLUTION INTRAVENOUS; SUBCUTANEOUS at 21:31

## 2023-10-17 NOTE — PLAN OF CARE
Goal Outcome Evaluation:  Plan of Care Reviewed With: patient        Progress: no change  Outcome Evaluation: pt alert and oriented x4. pt on room air. wound/skin care done per orders. blood glucose monitored per orders. medicated per orders.

## 2023-10-17 NOTE — PLAN OF CARE
Goal Outcome Evaluation:  Plan of Care Reviewed With: patient        Progress: no change  Outcome Evaluation: alert and oriented x 4. wound/skin care performed per orders. blood glucose monitored. medicated per orders. pt medicated for c/o flatulence, diarrhea, and pain this shift. no new issues needs at this time.

## 2023-10-17 NOTE — THERAPY TREATMENT NOTE
Patient Name: Jose Shaikh  : 1960    MRN: [id number]                              Today's Date: 10/17/2023       Admit Date: 9/10/2023    Visit Dx:     ICD-10-CM ICD-9-CM   1. Generalized weakness  R53.1 780.79   2. Hyponatremia  E87.1 276.1   3. Difficulty in walking  R26.2 719.7   4. Decreased activities of daily living (ADL)  Z78.9 V49.89   5. Difficulty walking  R26.2 719.7     Patient Active Problem List   Diagnosis    Diabetic ulcer of left heel associated with type 2 DM    Acute osteomyelitis of left calcaneus     Diabetic ulcer of left heel associated with type 2 DM    Diabetic ulcer of right midfoot associated with type 2 DM    Paroxysmal atrial fibrillation    Essential hypertension    Hyperlipidemia LDL goal <100    Cellulitis and abscess of foot    High alkaline phosphatase level    Osteomyelitis    Onychomycosis    Onychocryptosis    Foot pain, bilateral    Osteomyelitis of foot, right, acute    Cellulitis of right foot    Type 2 diabetes mellitus, with long-term current use of insulin    Class 3 severe obesity due to excess calories with serious comorbidity and body mass index (BMI) of 45.0 to 49.9 in adult    Anxiety disorder, unspecified    Claustrophobia    Dependence on wheelchair    Depression, unspecified    Long term (current) use of anticoagulants    Long term (current) use of oral hypoglycemic drugs    Wound of foot    Non-prs chronic ulcer oth prt r foot limited to brkdwn skin    Orthostatic hypotension    Other chronic osteomyelitis, right ankle and foot    Personal history of nicotine dependence    Thrombocytopenia, unspecified    Unspecified open wound, right foot, initial encounter    Diabetic foot infection    Subacute osteomyelitis of right foot    Right foot pain    Sepsis    Onychomycosis    Foot pain, left    Impaired mobility and ADLs    Absence of toe of right foot    Corns and callosity    Disability of walking    Fracture    Limb swelling    Polyneuropathy    Pressure  ulcer, stage 1    Shortness of breath    Generalized weakness     Past Medical History:   Diagnosis Date    Absence of toe of right foot     Acute osteomyelitis of left calcaneus  8/18/2021    Anxiety and depression     Arthritis     Claustrophobia     Corns and callus     Diabetic ulcer of left heel associated with type 2 DM 8/18/2021    Diabetic ulcer of left heel associated with type 2 DM 7/6/2021    Diabetic ulcer of right midfoot associated with type 2 DM 8/18/2021    Difficulty walking     Essential hypertension 8/31/2021    Hammertoe     Hyperlipidemia LDL goal <100 8/31/2021    Ingrown toenail     Obesity     Paroxysmal atrial fibrillation 8/31/2021    Polyneuropathy     Pressure ulcer, stage 1     Tinea unguium     Type 2 diabetes mellitus with polyneuropathy      Past Surgical History:   Procedure Laterality Date    CYST REMOVAL      center of back; benign    INCISION AND DRAINAGE ABSCESS      back    INCISION AND DRAINAGE LEG Right 12/10/2021    Procedure: INCISION AND DRAINAGE LOWER EXTREMITY;  Surgeon: Ash Leyva DPM;  Location: [facility] OR;  Service: Podiatry;  Laterality: Right;    OTHER SURGICAL HISTORY      Surgical clips left foot    TOE SURGERY Right     Removal of 5th toe    TRANS METATARSAL AMPUTATION Right 12/2/2021    Procedure: AMPUTATION TRANS METATARSAL;  Surgeon: Ash Leyva DPM;  Location: [facility] MAIN OR;  Service: Podiatry;  Laterality: Right;    WRIST SURGERY Left     repair of injury      General Information       Row Name 10/17/23 1032          OT Time and Intention    Document Type therapy note (daily note)  -AV     Mode of Treatment individual therapy;occupational therapy  -AV       Row Name 10/17/23 1032          General Information    Existing Precautions/Restrictions fall  -AV       Row Name 10/17/23 1032          Cognition    Orientation Status (Cognition) --  Able to perform therapeutic exercises with minimal cues and demonstration.  -AV       Row  Name 10/17/23 1032          Safety Issues, Functional Mobility    Impairments Affecting Function (Mobility) balance;endurance/activity tolerance;strength  -AV               User Key  (r) = Recorded By, (t) = Taken By, (c) = Cosigned By      Initials Name Provider Type    Uvaldo Hardy OT Occupational Therapist                     Mobility/ADL's    No documentation.                  Obj/Interventions       Row Name 10/17/23 1032          Shoulder (Therapeutic Exercise)    Shoulder Strengthening (Therapeutic Exercise) bilateral;flexion;horizontal aBduction/aDduction;3 lb free weight;20 repititions  -AV       Row Name 10/17/23 1032          Elbow/Forearm (Therapeutic Exercise)    Elbow/Forearm Strengthening (Therapeutic Exercise) bilateral;flexion;extension;supination;pronation;3 lb free weight;20 repititions  -AV       Row Name 10/17/23 1032          Motor Skills    Therapeutic Exercise shoulder;elbow/forearm  Performed in high Fowlers/on room air  -AV               User Key  (r) = Recorded By, (t) = Taken By, (c) = Cosigned By      Initials Name Provider Type    Uvaldo Hardy OT Occupational Therapist                   Goals/Plan    No documentation.                  Clinical Impression       Row Name 10/17/23 1033          Pain Scale: FACES Pre/Post-Treatment    Pain: FACES Scale, Pretreatment 0-->no hurt  -AV     Posttreatment Pain Rating 0-->no hurt  -AV       Row Name 10/17/23 1033          Plan of Care Review    Progress improving  Able to increase resistance to 3 pounds  -AV     Outcome Evaluation Patient performed upper extremity therapeutic exercises with 3 pound resistance to improve endurance/activity tolerance needed to support ADLs.  Continued OT indicated to remediate/compensate for deficits to maximize independence.  -AV       Row Name 10/17/23 1033          Vital Signs    O2 Delivery Pre Treatment room air  -AV     O2 Delivery Intra Treatment room air  -AV     O2 Delivery Post Treatment room  air  -AV               User Key  (r) = Recorded By, (t) = Taken By, (c) = Cosigned By      Initials Name Provider Type    Uvaldo Hardy OT Occupational Therapist                   Outcome Measures       Row Name 10/17/23 1034          How much help from another is currently needed...    Putting on and taking off regular lower body clothing? 1  -AV     Bathing (including washing, rinsing, and drying) 2  -AV     Toileting (which includes using toilet bed pan or urinal) 2  -AV     Putting on and taking off regular upper body clothing 3  -AV     Taking care of personal grooming (such as brushing teeth) 3  -AV     Eating meals 4  -AV     AM-PAC 6 Clicks Score (OT) 15  -AV       Row Name 10/17/23 1034          Optimal Instrument    Bending/Stooping 5  -AV     Standing 5  -AV     Reaching 1  -AV               User Key  (r) = Recorded By, (t) = Taken By, (c) = Cosigned By      Initials Name Provider Type    Uvaldo Hardy OT Occupational Therapist                    Occupational Therapy Education       Title: PT OT SLP Therapies (Done)       Topic: Occupational Therapy (Done)       Point: ADL training (Done)       Description:   Instruct learner(s) on proper safety adaptation and remediation techniques during self care or transfers.   Instruct in proper use of assistive devices.                  Learning Progress Summary             Patient Acceptance, E, VU,NR by RASHARD at 9/24/2023 0604    Acceptance, E, VU,DU by RF at 9/18/2023 1537    Acceptance, E, VU,DU by RF at 9/17/2023 1515    Acceptance, E, VU,DU by RF at 9/16/2023 1804    Acceptance, E, VU by AV at 9/13/2023 1059                         Point: Home exercise program (Done)       Description:   Instruct learner(s) on appropriate technique for monitoring, assisting and/or progressing therapeutic exercises/activities.                  Learning Progress Summary             Patient Acceptance, E, VU,NR by RASHARD at 9/24/2023 0604    Acceptance, E, VU,DU by RF at  9/18/2023 1537    Acceptance, E, VU,DU by RF at 9/17/2023 1515    Acceptance, E, VU,DU by RF at 9/16/2023 1804    Acceptance, E, VU by AV at 9/13/2023 1059                         Point: Precautions (Done)       Description:   Instruct learner(s) on prescribed precautions during self-care and functional transfers.                  Learning Progress Summary             Patient Acceptance, E, VU,NR by  at 9/24/2023 0604    Acceptance, E, VU,DU by RF at 9/18/2023 1537    Acceptance, E, VU,DU by RF at 9/17/2023 1515    Acceptance, E, VU,DU by RF at 9/16/2023 1804    Acceptance, E, VU by AV at 9/13/2023 1059                         Point: Body mechanics (Done)       Description:   Instruct learner(s) on proper positioning and spine alignment during self-care, functional mobility activities and/or exercises.                  Learning Progress Summary             Patient Acceptance, E, VU,NR by  at 9/24/2023 0604    Acceptance, E, VU,DU by RF at 9/18/2023 1537    Acceptance, E, VU,DU by RF at 9/17/2023 1515    Acceptance, E, VU,DU by RF at 9/16/2023 1804    Acceptance, E, VU by AV at 9/13/2023 1059                                         User Key       Initials Effective Dates Name Provider Type Discipline     06/16/21 -  Selena Lindquist, RONNIE Registered Nurse Nurse     06/16/21 -  Uvaldo Christine OT Occupational Therapist OT     01/19/22 -  Karl Baker, RN Registered Nurse Nurse                  OT Recommendation and Plan  Planned Therapy Interventions (OT): activity tolerance training, BADL retraining, functional balance retraining, occupation/activity based interventions, patient/caregiver education/training, transfer/mobility retraining  Therapy Frequency (OT): 5 times/wk  Plan of Care Review  Plan of Care Reviewed With: patient  Progress: improving (Able to increase resistance to 3 pounds)  Outcome Evaluation: Patient performed upper extremity therapeutic exercises with 3 pound resistance to improve  endurance/activity tolerance needed to support ADLs.  Continued OT indicated to remediate/compensate for deficits to maximize independence.     Time Calculation:   Evaluation Complexity (OT)  Review Occupational Profile/Medical/Therapy History Complexity: expanded/moderate complexity  Assessment, Occupational Performance/Identification of Deficit Complexity: 3-5 performance deficits  Clinical Decision Making Complexity (OT): problem focused assessment/low complexity  Overall Complexity of Evaluation (OT): low complexity     Time Calculation- OT       Row Name 10/17/23 1035             Time Calculation- OT    OT Received On 10/17/23  -AV      OT Goal Re-Cert Due Date 10/22/23  -AV         Timed Charges    42915 - OT Therapeutic Exercise Minutes 10  -AV         Total Minutes    Timed Charges Total Minutes 10  -AV       Total Minutes 10  -AV                User Key  (r) = Recorded By, (t) = Taken By, (c) = Cosigned By      Initials Name Provider Type    Uvaldo Hardy OT Occupational Therapist                  Therapy Charges for Today       Code Description Service Date Service Provider Modifiers Qty    [id number] HC OT THER PROC EA 15 MIN 10/17/2023 Uvaldo Christine OT GO 1                 Uvaldo Christine OT  10/17/2023

## 2023-10-17 NOTE — PROGRESS NOTES
[facility]   Hospitalist Progress Note  Date: 10/17/2023  Patient Name: Jose Shaikh  : 1960  MRN: [id number]  Date of admission: 9/10/2023  Consultants:   -Orthopedic Surgery: Dr. Riki Davis    Subjective   Subjective     Chief Complaint: Weakness    Summary:   Jose Shaikh is a 62 y.o. male with multiple medical problems who [facility] prior to [facility] and stated he could barely move or get out of his car so EMS was called.  Patient had hip and knee injections in hopes that this would help with his pain and allow him to work better with physical therapy and did some a little bit but he is unable to walk at this time and  is continue to try arrange rehab placement.  During hospitalization patient was evaluated by orthopedic surgery at patient's request for left total knee arthroplasty.  Per orthopedic surgery patient is not a candidate for left total knee arthroplasty due to morbid obesity, diabetes and chronic morbidities.  Patient has been here for a month waiting for placement which has been difficult due to a variety of barriers.    Interval Followup:   Patient seen and examined resting comfortably complaining of watery diarrhea C. difficile negative checking enteric atrial panel adding Florastor as needed Imodium additionally we will add Questran    Objective   Objective     Vitals:   Temp:  [97.8 °F (36.6 °C)-98.7 °F (37.1 °C)] 98.1 °F (36.7 °C)  Heart Rate:  [85-94] 90  Resp:  [16] 16  BP: (105-123)/(58-65) 119/63  Physical Exam   Constitutional: Awake alert oriented no acute distress  Respiratory clear  Cardiovascular: RRR  GI: Abdomen soft nontender bowel sounds present  Result Review    Result Review:  I have personally reviewed the results as below and agree with these findings:  [x]  Laboratory:   CMP          10/6/2023    06:12 10/11/2023    06:05 10/15/2023    05:00   CMP   Glucose 153  129  143    BUN 18  17  18    Creatinine 0.49   0.52  0.48    EGFR 114.6  112.6  115.3    Sodium 133  133  136    Potassium 4.0  4.3  3.9    Chloride 102  102  105    Calcium 8.8  8.9  8.6    BUN/Creatinine Ratio 36.7  32.7  37.5    Anion Gap 9.0  5.9  8.1      CBC          10/6/2023    06:12 10/11/2023    06:05 10/15/2023    05:00   CBC   WBC 7.77  5.58  6.30    RBC 4.91  4.54  4.19    Hemoglobin 12.3  11.6  10.6    Hematocrit 40.2  37.1  34.8    MCV 81.9  81.7  83.1    MCH 25.1  25.6  25.3    MCHC 30.6  31.3  30.5    RDW 17.5  17.4  18.1    Platelets 111  105  110    Blood glucose well controlled.  []  Microbiology:   []  Radiology:   []  EKG/Telemetry:    []  Cardiology/Vascular:    []  Pathology:  []  Old records:  []  Other:    Assessment & Plan   Assessment / Plan     Assessment/plan:  Generalized weakness  Deconditioning  Type 2 diabetes mellitus  Essential hypertension  Chronic paroxysmal atrial fibrillation on Eliquis  Obesity (BMI: 42.88)  Debility with wheelchair dependence  Severe degenerative joint disease of lower extremities  Chronic wound  Thrombocytopenia    Plan:    Patient with complaints of watery diarrhea C. difficile negative checking enteric bacterial panel  Adding Florastor  As needed Imodium and scheduled Questran  Recheck a.m. labs  Continue other medications as ordered  PT OT consultations  PT/OT/ consulted and following.  Placement has been difficult due to a variety of barriers.  Spoke with social work, they have already sent referrals to facilities outside of [place] and have still not heard back from any of them yet.  Will consider discharge home with home health if all efforts have been exhausted and patient is unable to be placed later this week   Discussed with RN    DVT prophylaxis:  Medical DVT prophylaxis orders are present.    CODE STATUS:   Code Status (Patient has no pulse and is not breathing): CPR (Attempt to Resuscitate)  Medical Interventions (Patient has pulse or is breathing): Full Support  Attending  documentation:  I reviewed the above documentation and independently reviewed and rounded and evaluated the patient and discussed the care plan with TALITA Lawrence PA-C, I agree with his findings and plan as documented, what I have added to the care plan and modified is as follows in my documentation and my medical decision making; 62-year-old male initially hospitalized on 9/10/2023, prolonged hospitalization for treatment of management of generalized weakness, deconditioning, with acute issues of diarrhea, C. difficile negative.  Interval follow-up: Seen and examined, complains of diarrhea, no acute distress, no acute major night events, several bowel movements, not being given Lomotil as needed, added a single dose of Imodium, checking stool enteric panel.  And Questran.  No fevers, chills, sweats.  Blood sugars adequately controlled.  Still awaiting rehab placement.  Review of systems obtained, all systems reviewed negative except generalized fatigue, generalized weakness, diarrhea.  Vitals reviewed, labs reviewed on physical exam well-appearing male, no acute distress, morbidly obese, regular rate and rhythm, clear auscultation bilaterally, soft nontender abdomen, no lower extreme edema, alert and oriented x3.  Assessment as above, plan, start Questran, single dose of Imodium, utilize Lomotil, check enteric panel, mobilize per pathway, still awaiting referrals for rehab, a.m. labs, full code, DVT prophylaxis with Eliquis, clinical course dictate further management, discussed with nurse at the bedside.  More than 70% of the time of this patient's encounter was performed by me, this included face-to-face time, planning and coordinating, medical decision making and critical thinking personally done by me.  -AVBMD  Electronically signed by Cailin Acosta MD, 10/17/23, 4:55 PM EDT.  Portions of this documentation were transcribed electronically from a voice recognition software.  I confirm all data accurately  represents the service(s) I performed at today's visit.

## 2023-10-17 NOTE — DISCHARGE PLACEMENT REQUEST
"Sami Shaikh (62 y.o. Male)       Date of Birth   1960    Social Security Number       Address   [address] [facility] [address]    Home Phone   [phone]    MRN   [id number]       Synagogue   None    Marital Status                               Admission Date   9/10/23    Admission Type   Emergency    Admitting Provider   Amauri Ingram MD    Attending Provider   Cailin Acosta MD    Department, Room/Bed   [address], 4027/1       Discharge Date       Discharge Disposition       Discharge Destination                                 Attending Provider: Cailin Acosta MD    Allergies: Adhesive Tape    Isolation: None   Infection: C.difficile (rule out) (10/17/23)   Code Status: CPR    Ht: 188 cm (74\")   Wt: 151 kg (334 lb)    Admission Cmt: None   Principal Problem: Generalized weakness [R53.1]                   Active Insurance as of 9/10/2023       Primary Coverage       Payor Plan Insurance Group Employer/Plan Group    [facility] MEDICARE REPLACEMENT [facility] DUAL COMPLETE MEDICARE REPLACEMENT [id number]       Payor Plan Address Payor Plan Phone Number Payor Plan Fax Number Effective Dates    [address] [phone]  5/1/2022 - None Entered    [facility] [id number]         Subscriber Name Subscriber Birth Date Member ID       SAMI SHAIKH 1960 [id number]               Secondary Coverage       Payor Plan Insurance Group Employer/Plan Group    [place]        Payor Plan Address Payor Plan Phone Number Payor Plan Fax Number Effective Dates    [address] [phone]  6/22/2021 - None Entered    [facility] [id number]         Subscriber Name Subscriber Birth Date Member ID       SAMI SHAIKH 1960 [id number]                     Emergency Contacts        (Rel.) Home Phone Work Phone Mobile Phone    RAMONITA SHAIKH JR (Son) -- -- [phone]    SHAIKHXIN CAMARENAECCA (Daughter) -- -- " [phone]    Michelle Chandra (Friend) -- -- [phone]              Emergency Contact Information       Name Relation Home Work Mobile    RAMONITA SHAIKH JR Son   [phone]    MICHELLE SHAIKH Daughter   [phone]    Michelle Chandra Friend   [phone]          Insurance Information                  [facility] MEDICARE REPLACEMENT/[facility] DUAL COMPLETE MEDICARE REPLACEMENT Phone: [phone]    Subscriber: Jose Shaikh Subscriber#: [id number]    Group#: [id number] Precert#: --        [place] Phone: [phone]    Subscriber: Jose Shaikh Subscriber#: [id number]    Group#: -- Precert#: --             History & Physical        Levi Finney MD at 23 0237           [facility]   HISTORY AND PHYSICAL    Patient Name: Jose Shaikh  : 1960  MRN: [id number]  Primary Care Physician:  Delia Cervantes, VALENTÍN  Date of admission: 9/10/2023    Subjective   Subjective     Chief Complaint: Generalized weakness    HPI:    Jose Shaikh is a 62 y.o. male with past medical history of diabetes with polyneuropathy and foot amputation, hypertension, chronic wounds, morbid obesity, anxiety/depression, and GERD presented via EMS with generalized weakness.  Patient was discharged from [place] rehab yesterday and stated that he could barely move or get out of his car so EMS was called to bring him to the ED.  Patient states that due to nonweightbearing restrictions that was implemented by wound care during his rehab, physical therapy did not work on strengthening his left lower extremity.  Due to the restriction patient feels as though his rehab was not adequate resulted in him being unable to care for himself upon discharge.  In the ED patient's vitals were all within normal limits on arrival.  Labs showed that he did have significant hyponatremia with remaining labs being relatively unremarkable given his chronic conditions.  When asked he denied any recent fevers,  chills, headaches, chest pain, palpitation, shortness of breath, cough, abdominal pain, nausea, vomiting, diarrhea, constipation, dysuria, hematuria, hematochezia, melena, or anxiety.  Patient was admitted for further evaluation and treatment.    Review of Systems   All systems were reviewed and negative except for: As specified in HPI    Personal History     Past Medical History:   Diagnosis Date    Absence of toe of right foot     Acute osteomyelitis of left calcaneus  8/18/2021    Anxiety and depression     Arthritis     Claustrophobia     Corns and callus     Diabetic ulcer of left heel associated with type 2 DM 8/18/2021    Diabetic ulcer of left heel associated with type 2 DM 7/6/2021    Diabetic ulcer of right midfoot associated with type 2 DM 8/18/2021    Difficulty walking     Essential hypertension 8/31/2021    Hammertoe     Hyperlipidemia LDL goal <100 8/31/2021    Ingrown toenail     Obesity     Paroxysmal atrial fibrillation 8/31/2021    Polyneuropathy     Pressure ulcer, stage 1     Tinea unguium     Type 2 diabetes mellitus with polyneuropathy        Past Surgical History:   Procedure Laterality Date    CYST REMOVAL      center of back; benign    INCISION AND DRAINAGE ABSCESS      back    INCISION AND DRAINAGE LEG Right 12/10/2021    Procedure: INCISION AND DRAINAGE LOWER EXTREMITY;  Surgeon: Ash Leyva DPM;  Location: [facility] OR;  Service: Podiatry;  Laterality: Right;    OTHER SURGICAL HISTORY      Surgical clips left foot    TOE SURGERY Right     Removal of 5th toe    TRANS METATARSAL AMPUTATION Right 12/2/2021    Procedure: AMPUTATION TRANS METATARSAL;  Surgeon: Ash Leyva DPM;  Location: [facility] OR;  Service: Podiatry;  Laterality: Right;    WRIST SURGERY Left     repair of injury       Family History: family history includes Cancer in his father; Heart disease in his brother, father, and mother. Otherwise pertinent FHx was reviewed and not pertinent to current  issue.    Social History:  reports that he quit smoking about 32 years ago. His smoking use included cigarettes. He has never used smokeless tobacco. He reports current alcohol use. He reports current drug use. Drug: Marijuana.    Home Medications:  Dulaglutide, HYDROcodone-acetaminophen, Insulin Lispro (1 Unit Dial), acetaminophen, apixaban, digoxin, insulin detemir, metFORMIN, metoprolol succinate XL, naloxone, pregabalin, and simvastatin      Allergies:  Allergies   Allergen Reactions    Adhesive Tape Rash       Objective   Objective     Vitals:   Temp:  [97.6 °F (36.4 °C)-98 °F (36.7 °C)] 98 °F (36.7 °C)  Heart Rate:  [] 83  Resp:  [18] 18  BP: ()/() 97/44  Physical Exam    Constitutional: Awake, alert   Eyes: PERRLA, sclerae anicteric, no conjunctival injection   HENT: NCAT, mucous membranes moist   Neck: Supple, no thyromegaly, no lymphadenopathy, trachea midline   Respiratory: Clear to auscultation bilaterally, nonlabored respirations    Cardiovascular: RRR, no murmurs, rubs, or gallops, palpable pedal pulses bilaterally   Gastrointestinal: Positive bowel sounds, soft, nontender, nondistended   Musculoskeletal: Transmetatarsal amputation, no clubbing or cyanosis to extremities   Psychiatric: Appropriate affect, cooperative   Neurologic: Oriented x 3, strength symmetric in all extremities, Cranial Nerves grossly intact to confrontation, speech clear   Skin: Healing lower extremity wounds    Result Review    Result Review:  I have personally reviewed the results from the time of this admission to 9/11/2023 02:37 EDT and agree with these findings:  [x]  Laboratory list / accordion  []  Microbiology  []  Radiology  [x]  EKG/Telemetry   []  Cardiology/Vascular   []  Pathology  []  Old records  []  Other:  Most notable findings include: Hyponatremia      Assessment & Plan   Assessment / Plan     Brief Patient Summary:  Jose Shaikh is a 62 y.o. male who ith past medical history of diabetes with  polyneuropathy and foot amputation, hypertension, chronic wounds, morbid obesity, anxiety/depression, and GERD presented via EMS with generalized weakness    Active Hospital Problems:  Active Hospital Problems    Diagnosis     **Generalized weakness     Type 2 diabetes mellitus, with long-term current use of insulin     Class 3 severe obesity due to excess calories with serious comorbidity and body mass index (BMI) of 45.0 to 49.9 in adult     Dependence on wheelchair     Foot pain, bilateral     Paroxysmal atrial fibrillation     Essential hypertension     Diabetic ulcer of left heel associated with type 2 DM      Plan:     Generalized weakness  -Admit to medical floor  -Discharged from inpatient rehab yesterday  -Patient unable to perform ADLs.   -Patient morbidly obese  -Fall precautions  -PT OT  -Wound care  -Case management consulted for likely placement  -Supportive care    Hyponatremia  -Patient euvolemic, asymptomatic  -Glucose mildly elevated  -Superimposed hypochloremia  -EKG reviewed  -Urinalysis pending  -Admits to diarrhea  -NS IVF for now  -Further work-up per clinical course  -Follow labs    DM2  -ISS  -Titrate as needed    HTN  -Currently well controlled  -PRN BP meds  -Resume home meds when available  -Titrate if needed    Hx A-fib  -Resume home AC when available    Hx CKD  Hx CAD  Hx Morbid Obesity  Hx Chronic Wound: Wound care consulted  Debility/Wheelchair dependence     GI ppx  DVT ppx      DVT prophylaxis:  No DVT prophylaxis order currently exists.    CODE STATUS:       Admission Status:  I believe this patient meets inpatient status.      Electronically signed by Levi Finney MD, 23, 2:37 AM EDT.    Electronically signed by Levi Finney MD at 23 0654          Physician Progress Notes (most recent note)        Amauri Ingram MD at 10/16/23 0946          [facility] Note  Date: 10/16/2023  Patient Name: Jose Shaikh  : 1960  MRN:  [id number]  Date of admission: 9/10/2023  Consultants:   -Orthopedic Surgery: Dr. Riki Davis    Subjective   Subjective     Chief Complaint: Weakness    Summary:   Jose Shaikh is a 62 y.o. male with multiple medical problems who was discharged from [facility] the day prior to admission and stated he could barely move or get out of his car so EMS was called.  Patient had hip and knee injections in hopes that this would help with his pain and allow him to work better with physical therapy and did some a little bit but he is unable to walk at this time and  is continue to try arrange rehab placement.  During hospitalization patient was evaluated by orthopedic surgery at patient's request for left total knee arthroplasty.  Per orthopedic surgery patient is not a candidate for left total knee arthroplasty due to morbid obesity, diabetes and chronic morbidities.  Patient has been here for a month waiting for placement which has been difficult due to a variety of barriers.    Interval Followup:   No acute events overnight.  Pain has been better controlled.  Slept well last night.    Objective   Objective     Vitals:   Temp:  [97.6 °F (36.4 °C)-98.3 °F (36.8 °C)] 97.6 °F (36.4 °C)  Heart Rate:  [] 93  Resp:  [14-20] 14  BP: (115-139)/(59-65) 117/61  Physical Exam   Gen: No acute distress, resting comfortably in bed on arrival  Resp: CTAB, no w/r/r, normal respiratory effort  Card: RRR, no MRG   Abd: Obese, soft, Nontender, Nondistended, + bowel sounds    Result Review    Result Review:  I have personally reviewed the results as below and agree with these findings:  [x]  Laboratory:   CMP          10/6/2023    06:12 10/11/2023    06:05 10/15/2023    05:00   CMP   Glucose 153  129  143    BUN 18  17  18    Creatinine 0.49  0.52  0.48    EGFR 114.6  112.6  115.3    Sodium 133  133  136    Potassium 4.0  4.3  3.9    Chloride 102  102  105    Calcium 8.8  8.9  8.6    BUN/Creatinine Ratio 36.7  32.7   37.5    Anion Gap 9.0  5.9  8.1      CBC          10/6/2023    06:12 10/11/2023    06:05 10/15/2023    05:00   CBC   WBC 7.77  5.58  6.30    RBC 4.91  4.54  4.19    Hemoglobin 12.3  11.6  10.6    Hematocrit 40.2  37.1  34.8    MCV 81.9  81.7  83.1    MCH 25.1  25.6  25.3    MCHC 30.6  31.3  30.5    RDW 17.5  17.4  18.1    Platelets 111  105  110    Blood glucose well controlled.  []  Microbiology:   []  Radiology:   []  EKG/Telemetry:    []  Cardiology/Vascular:    []  Pathology:  []  Old records:  []  Other:    Assessment & Plan   Assessment / Plan     Assessment/plan:  Generalized weakness  Deconditioning  Type 2 diabetes mellitus  Essential hypertension  Chronic paroxysmal atrial fibrillation on Eliquis  Obesity (BMI: 42.88)  Debility with wheelchair dependence  Severe degenerative joint disease of lower extremities  Chronic wound  Thrombocytopenia    Continue to monitor in the hospital for management of the above  Continue baclofen 3 times daily as needed  Continue as needed Norco and tramadol  Continue Lyrica 75 mg 3 times daily  Continue ibuprofen every 4 hours as needed  Continue bowel regimen  Continue Jardiance and lisinopril  Blood sugars acceptable, continue current insulin regimen  PT/OT consulted and following.  Placement has been difficult due to a variety of barriers.  Spoke with social work, they have already sent referrals to facilities outside of [place] and have still not heard back from any of them yet.  Will consider discharge home with home health if all efforts have been exhausted and patient is unable to be placed later this week  Lab holiday tomorrow     Discussed case with: Bedside RN, social work    DVT prophylaxis:  Medical DVT prophylaxis orders are present.    CODE STATUS:   Code Status (Patient has no pulse and is not breathing): CPR (Attempt to Resuscitate)  Medical Interventions (Patient has pulse or is breathing): Full Support        Electronically signed by Amauri Ingram MD  at 10/16/23 0948          Physical Therapy Notes (most recent note)        Kenisha Webb, PT Student at 10/12/23 1320  Version 1 of 1      Attestation signed by Deedee Landers, PT at 10/12/23 1516                      Acute Care - Physical Therapy Treatment Note  [facility]     Patient Name: Jose Shaikh  : 1960  MRN: [id number]  Today's Date: 10/12/2023      Visit Dx:     ICD-10-CM ICD-9-CM   1. Generalized weakness  R53.1 780.79   2. Hyponatremia  E87.1 276.1   3. Difficulty in walking  R26.2 719.7   4. Decreased activities of daily living (ADL)  Z78.9 V49.89   5. Difficulty walking  R26.2 719.7     Patient Active Problem List   Diagnosis    Diabetic ulcer of left heel associated with type 2 DM    Acute osteomyelitis of left calcaneus     Diabetic ulcer of left heel associated with type 2 DM    Diabetic ulcer of right midfoot associated with type 2 DM    Paroxysmal atrial fibrillation    Essential hypertension    Hyperlipidemia LDL goal <100    Cellulitis and abscess of foot    High alkaline phosphatase level    Osteomyelitis    Onychomycosis    Onychocryptosis    Foot pain, bilateral    Osteomyelitis of foot, right, acute    Cellulitis of right foot    Type 2 diabetes mellitus, with long-term current use of insulin    Class 3 severe obesity due to excess calories with serious comorbidity and body mass index (BMI) of 45.0 to 49.9 in adult    Anxiety disorder, unspecified    Claustrophobia    Dependence on wheelchair    Depression, unspecified    Long term (current) use of anticoagulants    Long term (current) use of oral hypoglycemic drugs    Wound of foot    Non-prs chronic ulcer oth prt r foot limited to brkdwn skin    Orthostatic hypotension    Other chronic osteomyelitis, right ankle and foot    Personal history of nicotine dependence    Thrombocytopenia, unspecified    Unspecified open wound, right foot, initial encounter    Diabetic foot infection    Subacute osteomyelitis of right foot    Right  foot pain    Sepsis    Onychomycosis    Foot pain, left    Impaired mobility and ADLs    Absence of toe of right foot    Corns and callosity    Disability of walking    Fracture    Limb swelling    Polyneuropathy    Pressure ulcer, stage 1    Shortness of breath    Generalized weakness     Past Medical History:   Diagnosis Date    Absence of toe of right foot     Acute osteomyelitis of left calcaneus  8/18/2021    Anxiety and depression     Arthritis     Claustrophobia     Corns and callus     Diabetic ulcer of left heel associated with type 2 DM 8/18/2021    Diabetic ulcer of left heel associated with type 2 DM 7/6/2021    Diabetic ulcer of right midfoot associated with type 2 DM 8/18/2021    Difficulty walking     Essential hypertension 8/31/2021    Hammertoe     Hyperlipidemia LDL goal <100 8/31/2021    Ingrown toenail     Obesity     Paroxysmal atrial fibrillation 8/31/2021    Polyneuropathy     Pressure ulcer, stage 1     Tinea unguium     Type 2 diabetes mellitus with polyneuropathy      Past Surgical History:   Procedure Laterality Date    CYST REMOVAL      center of back; benign    INCISION AND DRAINAGE ABSCESS      back    INCISION AND DRAINAGE LEG Right 12/10/2021    Procedure: INCISION AND DRAINAGE LOWER EXTREMITY;  Surgeon: Ash Leyva DPM;  Location: [facility];  Service: Podiatry;  Laterality: Right;    OTHER SURGICAL HISTORY      Surgical clips left foot    TOE SURGERY Right     Removal of 5th toe    TRANS METATARSAL AMPUTATION Right 12/2/2021    Procedure: AMPUTATION TRANS METATARSAL;  Surgeon: Ash Leyva DPM;  Location: [facility];  Service: Podiatry;  Laterality: Right;    WRIST SURGERY Left     repair of injury     PT Assessment (last 12 hours)       PT Evaluation and Treatment       Row Name 10/12/23 1300          Physical Therapy Time and Intention    Subjective Information fatigue;pain (P)   -AM     Document Type therapy note (daily note) (P)   -AM     Mode of  Treatment individual therapy;physical therapy (P)   -AM     Patient Effort good (P)   -AM     Symptoms Noted During/After Treatment increased pain (P)   -AM       Row Name 10/12/23 1300          General Information    Patient Observations alert;cooperative;agree to therapy (P)   -AM       Row Name 10/12/23 1300          Bed Mobility    Bed Mobility supine-sit;sit-supine (P)   -AM     Supine-Sit [place] (Bed Mobility) contact guard (P)   -AM     Sit-Supine [place] (Bed Mobility) moderate assist (50% patient effort) (P)   -AM       Row Name 10/12/23 1300          Transfers    Transfers sit-stand transfer;stand-sit transfer (P)   -AM       Row Name 10/12/23 1300          Sit-Stand Transfer    Sit-Stand [place] (Transfers) moderate assist (50% patient effort);2 person assist (P)   -AM     Assistive Device (Sit-Stand Transfers) walker, front-wheeled (P)   -AM       Row Name 10/12/23 1300          Stand-Sit Transfer    Stand-Sit [place] (Transfers) moderate assist (50% patient effort);2 person assist (P)   -AM     Assistive Device (Stand-Sit Transfers) walker, front-wheeled (P)   -AM       Row Name 10/12/23 1300          Gait/Stairs (Locomotion)    Gait/Stairs Locomotion gait/ambulation assistive device (P)   -AM     [place] Level (Gait) moderate assist (50% patient effort);2 person assist (P)   -AM     Assistive Device (Gait) walker, front-wheeled (P)   -AM     Patient was able to Ambulate yes (P)   -AM     Distance in Feet (Gait) -- (P)   pt took 1 step forward, limited by L hip pain  -AM       Row Name 10/12/23 1300          Balance    Balance Assessment standing dynamic balance (P)   -AM     Dynamic Standing Balance moderate assist;2-person assist (P)   -AM       Row Name 10/12/23 1300          Motor Skills    Therapeutic Exercise hip;knee;ankle (P)   -AM       Row Name 10/12/23 1300          Hip (Therapeutic Exercise)    Hip AROM (Therapeutic Exercise) bilateral;flexion;aBduction;aDduction;10  repetitions (P)   -AM       Row Name 10/12/23 1300          Knee (Therapeutic Exercise)    Knee Strengthening (Therapeutic Exercise) bilateral;heel slides;10 repetitions (P)   -AM       Row Name 10/12/23 1300          Ankle (Therapeutic Exercise)    Ankle (Therapeutic Exercise) AROM (active range of motion) (P)   -AM     Ankle AROM (Therapeutic Exercise) bilateral;dorsiflexion;plantarflexion;15 repititions (P)   -AM       Row Name             Wound 09/10/23 2345 Right anterior hip MASD (Moisture associated skin damage)    Wound - Properties Group Placement Date: 09/10/23  -BL Placement Time: 2345  -BL Present on Original Admission: Y  -BL Side: Right  -BL Orientation: anterior  -BL Location: hip  -BL Primary Wound Type: MASD  -BL    Retired Wound - Properties Group Placement Date: 09/10/23  -BL Placement Time: 2345  -BL Present on Original Admission: Y  -BL Side: Right  -BL Orientation: anterior  -BL Location: hip  -BL Primary Wound Type: MASD  -BL    Retired Wound - Properties Group Date first assessed: 09/10/23  -BL Time first assessed: 2345  -BL Present on Original Admission: Y  -BL Side: Right  -BL Location: hip  -BL Primary Wound Type: MASD  -BL      Row Name             Wound 12/02/21 Right anterior foot Incision    Wound - Properties Group Placement Date: 12/02/21  -ES Side: Right  -ES Orientation: anterior  -ES Location: foot  -ES Primary Wound Type: Incision  -ES    Retired Wound - Properties Group Placement Date: 12/02/21  -ES Side: Right  -ES Orientation: anterior  -ES Location: foot  -ES Primary Wound Type: Incision  -ES    Retired Wound - Properties Group Date first assessed: 12/02/21  -ES Side: Right  -ES Location: foot  -ES Primary Wound Type: Incision  -ES      Row Name             Wound 09/15/23 Right gluteal    Wound - Properties Group Placement Date: 09/15/23  -NH Side: Right  -NH Location: gluteal  -NH    Retired Wound - Properties Group Placement Date: 09/15/23  -NH Side: Right  -NH Location:  gluteal  -NH    Retired Wound - Properties Group Date first assessed: 09/15/23  -NH Side: Right  -NH Location: gluteal  -NH      Row Name 10/12/23 1300          Progress Summary (PT)    Progress Toward Functional Goals (PT) progress toward functional goals is good (P)   -AM               User Key  (r) = Recorded By, (t) = Taken By, (c) = Cosigned By      Initials Name Provider Type    Sarah Bernstein, RN Registered Nurse    Dottie Foster RN Registered Nurse    Katlyn Garcia RN Registered Nurse    Kenisha Fleming, PT Student PT Student                    Physical Therapy Education       Title: PT OT SLP Therapies (Done)       Topic: Physical Therapy (Done)       Point: Mobility training (Done)       Learning Progress Summary             Patient Acceptance, E, VU,NR by RASHARD at 9/24/2023 0604    Acceptance, E, VU,DU by RF at 9/18/2023 1537    Acceptance, E, VU,DU by RF at 9/17/2023 1515    Acceptance, E, VU,DU by RF at 9/16/2023 1804    Acceptance, E, VU by AV at 9/13/2023 1059    Acceptance, E,TB, VU by  at 9/12/2023 1308                         Point: Home exercise program (Done)       Learning Progress Summary             Patient Acceptance, E, VU,NR by RASHARD at 9/24/2023 0604    Acceptance, E, VU,DU by RF at 9/18/2023 1537    Acceptance, E, VU,DU by RF at 9/17/2023 1515    Acceptance, E, VU,DU by RF at 9/16/2023 1804    Acceptance, E, VU by AV at 9/13/2023 1059    Acceptance, E,TB, VU by  at 9/12/2023 1308                         Point: Body mechanics (Done)       Learning Progress Summary             Patient Acceptance, E, VU,NR by RASHARD at 9/24/2023 0604    Acceptance, E, VU,DU by  at 9/18/2023 1537    Acceptance, E, VU,DU by RF at 9/17/2023 1515    Acceptance, E, VU,DU by RF at 9/16/2023 1804    Acceptance, E, VU by AV at 9/13/2023 1059    Acceptance, E,TB, VU by  at 9/12/2023 1308                         Point: Precautions (Done)       Learning Progress Summary             Patient  Acceptance, E, VU,NR by RASHARD at 9/24/2023 0604    Acceptance, E, VU,DU by RF at 9/18/2023 1537    Acceptance, E, VU,DU by RF at 9/17/2023 1515    Acceptance, E, VU,DU by RF at 9/16/2023 1804    Acceptance, E, VU by AV at 9/13/2023 1059    Acceptance, E,TB, VU by  at 9/12/2023 1308                                         User Key       Initials Effective Dates Name Provider Type Discipline    RASHARD 06/16/21 -  Selena Lindquist, RN Registered Nurse Nurse    AV 06/16/21 -  Uvaldo Christine OT Occupational Therapist OT    RF 01/19/22 -  Karl Baker, RN Registered Nurse Nurse     08/16/23 -  Gamal Simmons PT Student PT Student PT                  PT Recommendation and Plan  Anticipated Discharge Disposition (PT): inpatient rehabilitation facility  Planned Therapy Interventions (PT): balance training, bed mobility training, gait training, neuromuscular re-education, strengthening, transfer training  Therapy Frequency (PT): daily  Progress Summary (PT)  Progress Toward Functional Goals (PT): (P) progress toward functional goals is good  Daily Progress Summary (PT): Pt tolerated therapeutic exercises well this date.  Plan of Care Reviewed With: patient  Progress: improving  Outcome Evaluation: Pt presesnts with decreased strength, decreased activity tolerance and decreased stnading balance limiting pt's independence with functional transfers and ambulation. Pt has made improvements this far due to increased LE muscle strength and decreased assistance with bed mobility, but pt will still benefit from PT services to continue to address impairments and achieve maximum potential with functional mobility.   Outcome Measures       Row Name 10/12/23 1300 10/11/23 1100 10/10/23 1000       How much help from another person do you currently need...    Turning from your back to your side while in flat bed without using bedrails? 3 (P)   -AM 3  -MOE (r) AM (t) MOE (c) 4  -AV (r) ZT (t) AV (c)    Moving from lying on back to  sitting on the side of a flat bed without bedrails? 3 (P)   -AM 3  -MOE (r) AM (t) MOE (c) 3  -AV (r) ZT (t) AV (c)    Moving to and from a bed to a chair (including a wheelchair)? 2 (P)   -AM 2  -MOE (r) AM (t) MOE (c) 2  -AV (r) ZT (t) AV (c)    Standing up from a chair using your arms (e.g., wheelchair, bedside chair)? 2 (P)   -AM 1  -MOE (r) AM (t) MOE (c) 1  -AV (r) ZT (t) AV (c)    Climbing 3-5 steps with a railing? 1 (P)   -AM 1  -MOE (r) AM (t) MOE (c) 1  -AV (r) ZT (t) AV (c)    To walk in hospital room? 1 (P)   -AM 1  -MOE (r) AM (t) MOE (c) 1  -AV (r) ZT (t) AV (c)    AM-PAC 6 Clicks Score (PT) 12 (P)   -AM 11  -MOE (r) AM (t) 12  -AV (r) ZT (t)    Highest level of mobility 4 --> Transferred to chair/commode (P)   -AM 4 --> Transferred to chair/commode  -MOE (r) AM (t) 4 --> Transferred to chair/commode  -AV (r) ZT (t)       Functional Assessment    Outcome Measure Options AM-PAC 6 Clicks Basic Mobility (PT) (P)   -AM AM-PAC 6 Clicks Basic Mobility (PT)  -MOE (r) AM (t) MOE (c) --              User Key  (r) = Recorded By, (t) = Taken By, (c) = Cosigned By      Initials Name Provider Type    Merlin De La O, PT Physical Therapist    Frankie Brunson, PT Physical Therapist    Oscar Ward, PT Student PT Student    Kenisha Fleming, PT Student PT Student                     Time Calculation:    PT Charges       Row Name 10/12/23 1319             Time Calculation    PT Received On 10/12/23 (P)   -AM         Timed Charges    36151 - PT Therapeutic Exercise Minutes 6 (P)   -AM      84499 - Gait Training Minutes  4 (P)   -AM      88787 - PT Therapeutic Activity Minutes 5 (P)   -AM         Total Minutes    Timed Charges Total Minutes 15 (P)   -AM       Total Minutes 15 (P)   -AM                User Key  (r) = Recorded By, (t) = Taken By, (c) = Cosigned By      Initials Name Provider Type    Kenisha Fleming PT Student PT Student                  Therapy Charges for Today       Code Description Service Date  Service Provider Modifiers Qty    36232186113 HC PT RE-EVAL ESTABLISHED PLAN 2 10/11/2023 Kenisha Webb, PT Student GP 1    [id number]  PT THER PROC EA 15 MIN 10/12/2023 Kenisha Webb, PT Student GP 1            PT G-Codes  Outcome Measure Options: (P) AM-PAC 6 Clicks Basic Mobility (PT)  AM-PAC 6 Clicks Score (PT): (P) 12  AM-PAC 6 Clicks Score (OT): 15    Kenisha Webb PT Student  10/12/2023      Electronically signed by Deedee Landers, PT at 10/12/23 1516          Occupational Therapy Notes (most recent note)        Uvaldo Christine, OT at 10/17/23 1036          Patient Name: Jose Shaikh  : 1960    MRN: [id number]                              Today's Date: 10/17/2023       Admit Date: 9/10/2023    Visit Dx:     ICD-10-CM ICD-9-CM   1. Generalized weakness  R53.1 780.79   2. Hyponatremia  E87.1 276.1   3. Difficulty in walking  R26.2 719.7   4. Decreased activities of daily living (ADL)  Z78.9 V49.89   5. Difficulty walking  R26.2 719.7     Patient Active Problem List   Diagnosis    Diabetic ulcer of left heel associated with type 2 DM    Acute osteomyelitis of left calcaneus     Diabetic ulcer of left heel associated with type 2 DM    Diabetic ulcer of right midfoot associated with type 2 DM    Paroxysmal atrial fibrillation    Essential hypertension    Hyperlipidemia LDL goal <100    Cellulitis and abscess of foot    High alkaline phosphatase level    Osteomyelitis    Onychomycosis    Onychocryptosis    Foot pain, bilateral    Osteomyelitis of foot, right, acute    Cellulitis of right foot    Type 2 diabetes mellitus, with long-term current use of insulin    Class 3 severe obesity due to excess calories with serious comorbidity and body mass index (BMI) of 45.0 to 49.9 in adult    Anxiety disorder, unspecified    Claustrophobia    Dependence on wheelchair    Depression, unspecified    Long term (current) use of anticoagulants    Long term (current) use of oral hypoglycemic drugs    Wound of  foot    Non-prs chronic ulcer oth prt r foot limited to brkdwn skin    Orthostatic hypotension    Other chronic osteomyelitis, right ankle and foot    Personal history of nicotine dependence    Thrombocytopenia, unspecified    Unspecified open wound, right foot, initial encounter    Diabetic foot infection    Subacute osteomyelitis of right foot    Right foot pain    Sepsis    Onychomycosis    Foot pain, left    Impaired mobility and ADLs    Absence of toe of right foot    Corns and callosity    Disability of walking    Fracture    Limb swelling    Polyneuropathy    Pressure ulcer, stage 1    Shortness of breath    Generalized weakness     Past Medical History:   Diagnosis Date    Absence of toe of right foot     Acute osteomyelitis of left calcaneus  8/18/2021    Anxiety and depression     Arthritis     Claustrophobia     Corns and callus     Diabetic ulcer of left heel associated with type 2 DM 8/18/2021    Diabetic ulcer of left heel associated with type 2 DM 7/6/2021    Diabetic ulcer of right midfoot associated with type 2 DM 8/18/2021    Difficulty walking     Essential hypertension 8/31/2021    Hammertoe     Hyperlipidemia LDL goal <100 8/31/2021    Ingrown toenail     Obesity     Paroxysmal atrial fibrillation 8/31/2021    Polyneuropathy     Pressure ulcer, stage 1     Tinea unguium     Type 2 diabetes mellitus with polyneuropathy      Past Surgical History:   Procedure Laterality Date    CYST REMOVAL      center of back; benign    INCISION AND DRAINAGE ABSCESS      back    INCISION AND DRAINAGE LEG Right 12/10/2021    Procedure: INCISION AND DRAINAGE LOWER EXTREMITY;  Surgeon: Ash Leyva DPM;  Location: [facility];  Service: Podiatry;  Laterality: Right;    OTHER SURGICAL HISTORY      Surgical clips left foot    TOE SURGERY Right     Removal of 5th toe    TRANS METATARSAL AMPUTATION Right 12/2/2021    Procedure: AMPUTATION TRANS METATARSAL;  Surgeon: Ash Leyva DPM;  Location:  [facility] MAIN OR;  Service: Podiatry;  Laterality: Right;    WRIST SURGERY Left     repair of injury      General Information       Row Name 10/17/23 1032          OT Time and Intention    Document Type therapy note (daily note)  -AV     Mode of Treatment individual therapy;occupational therapy  -AV       Row Name 10/17/23 1032          General Information    Existing Precautions/Restrictions fall  -AV       Row Name 10/17/23 1032          Cognition    Orientation Status (Cognition) --  Able to perform therapeutic exercises with minimal cues and demonstration.  -AV       Row Name 10/17/23 1032          Safety Issues, Functional Mobility    Impairments Affecting Function (Mobility) balance;endurance/activity tolerance;strength  -AV               User Key  (r) = Recorded By, (t) = Taken By, (c) = Cosigned By      Initials Name Provider Type    Uvaldo Hardy OT Occupational Therapist                     Mobility/ADL's    No documentation.                  Obj/Interventions       Row Name 10/17/23 1032          Shoulder (Therapeutic Exercise)    Shoulder Strengthening (Therapeutic Exercise) bilateral;flexion;horizontal aBduction/aDduction;3 lb free weight;20 repititions  -AV       Row Name 10/17/23 1032          Elbow/Forearm (Therapeutic Exercise)    Elbow/Forearm Strengthening (Therapeutic Exercise) bilateral;flexion;extension;supination;pronation;3 lb free weight;20 repititions  -AV       Row Name 10/17/23 1032          Motor Skills    Therapeutic Exercise shoulder;elbow/forearm  Performed in high Fowlers/on room air  -AV               User Key  (r) = Recorded By, (t) = Taken By, (c) = Cosigned By      Initials Name Provider Type    Uvaldo Hardy OT Occupational Therapist                   Goals/Plan    No documentation.                  Clinical Impression       Row Name 10/17/23 1033          Pain Scale: FACES Pre/Post-Treatment    Pain: FACES Scale, Pretreatment 0-->no hurt  -AV     Posttreatment Pain  Rating 0-->no hurt  -AV       Row Name 10/17/23 1033          Plan of Care Review    Progress improving  Able to increase resistance to 3 pounds  -AV     Outcome Evaluation Patient performed upper extremity therapeutic exercises with 3 pound resistance to improve endurance/activity tolerance needed to support ADLs.  Continued OT indicated to remediate/compensate for deficits to maximize independence.  -AV       Row Name 10/17/23 1033          Vital Signs    O2 Delivery Pre Treatment room air  -AV     O2 Delivery Intra Treatment room air  -AV     O2 Delivery Post Treatment room air  -AV               User Key  (r) = Recorded By, (t) = Taken By, (c) = Cosigned By      Initials Name Provider Type    Uvaldo Hardy OT Occupational Therapist                   Outcome Measures       Row Name 10/17/23 1034          How much help from another is currently needed...    Putting on and taking off regular lower body clothing? 1  -AV     Bathing (including washing, rinsing, and drying) 2  -AV     Toileting (which includes using toilet bed pan or urinal) 2  -AV     Putting on and taking off regular upper body clothing 3  -AV     Taking care of personal grooming (such as brushing teeth) 3  -AV     Eating meals 4  -AV     AM-PAC 6 Clicks Score (OT) 15  -AV       Row Name 10/17/23 1034          Optimal Instrument    Bending/Stooping 5  -AV     Standing 5  -AV     Reaching 1  -AV               User Key  (r) = Recorded By, (t) = Taken By, (c) = Cosigned By      Initials Name Provider Type    Uvaldo Hardy OT Occupational Therapist                    Occupational Therapy Education       Title: PT OT SLP Therapies (Done)       Topic: Occupational Therapy (Done)       Point: ADL training (Done)       Description:   Instruct learner(s) on proper safety adaptation and remediation techniques during self care or transfers.   Instruct in proper use of assistive devices.                  Learning Progress Summary             Patient  Acceptance, E, VU,NR by RASHARD at 9/24/2023 0604    Acceptance, E, VU,DU by RF at 9/18/2023 1537    Acceptance, E, VU,DU by RF at 9/17/2023 1515    Acceptance, E, VU,DU by RF at 9/16/2023 1804    Acceptance, E, VU by AV at 9/13/2023 1059                         Point: Home exercise program (Done)       Description:   Instruct learner(s) on appropriate technique for monitoring, assisting and/or progressing therapeutic exercises/activities.                  Learning Progress Summary             Patient Acceptance, E, VU,NR by RASHARD at 9/24/2023 0604    Acceptance, E, VU,DU by RF at 9/18/2023 1537    Acceptance, E, VU,DU by RF at 9/17/2023 1515    Acceptance, E, VU,DU by RF at 9/16/2023 1804    Acceptance, E, VU by AV at 9/13/2023 1059                         Point: Precautions (Done)       Description:   Instruct learner(s) on prescribed precautions during self-care and functional transfers.                  Learning Progress Summary             Patient Acceptance, E, VU,NR by RASHARD at 9/24/2023 0604    Acceptance, E, VU,DU by RF at 9/18/2023 1537    Acceptance, E, VU,DU by RF at 9/17/2023 1515    Acceptance, E, VU,DU by RF at 9/16/2023 1804    Acceptance, E, VU by AV at 9/13/2023 1059                         Point: Body mechanics (Done)       Description:   Instruct learner(s) on proper positioning and spine alignment during self-care, functional mobility activities and/or exercises.                  Learning Progress Summary             Patient Acceptance, E, VU,NR by RASHARD at 9/24/2023 0604    Acceptance, E, VU,DU by RF at 9/18/2023 1537    Acceptance, E, VU,DU by RF at 9/17/2023 1515    Acceptance, E, VU,DU by RF at 9/16/2023 1804    Acceptance, E, VU by AV at 9/13/2023 1059                                         User Key       Initials Effective Dates Name Provider Type Discipline    RASHARD 06/16/21 -  Selena Lindquist, RN Registered Nurse Nurse    AV 06/16/21 -  Uvaldo Christine OT Occupational Therapist OT     01/19/22 -   Karl Baker, RN Registered Nurse Nurse                  OT Recommendation and Plan  Planned Therapy Interventions (OT): activity tolerance training, BADL retraining, functional balance retraining, occupation/activity based interventions, patient/caregiver education/training, transfer/mobility retraining  Therapy Frequency (OT): 5 times/wk  Plan of Care Review  Plan of Care Reviewed With: patient  Progress: improving (Able to increase resistance to 3 pounds)  Outcome Evaluation: Patient performed upper extremity therapeutic exercises with 3 pound resistance to improve endurance/activity tolerance needed to support ADLs.  Continued OT indicated to remediate/compensate for deficits to maximize independence.     Time Calculation:   Evaluation Complexity (OT)  Review Occupational Profile/Medical/Therapy History Complexity: expanded/moderate complexity  Assessment, Occupational Performance/Identification of Deficit Complexity: 3-5 performance deficits  Clinical Decision Making Complexity (OT): problem focused assessment/low complexity  Overall Complexity of Evaluation (OT): low complexity     Time Calculation- OT       Row Name 10/17/23 1035             Time Calculation- OT    OT Received On 10/17/23  -AV      OT Goal Re-Cert Due Date 10/22/23  -AV         Timed Charges    20223 - OT Therapeutic Exercise Minutes 10  -AV         Total Minutes    Timed Charges Total Minutes 10  -AV       Total Minutes 10  -AV                User Key  (r) = Recorded By, (t) = Taken By, (c) = Cosigned By      Initials Name Provider Type    AV Uvaldo Christine OT Occupational Therapist                  Therapy Charges for Today       Code Description Service Date Service Provider Modifiers Qty    [id number]  OT THER PROC EA 15 MIN 10/17/2023 Uvaldo Christine OT GO 1                 Uvalod Christine OT  10/17/2023    Electronically signed by Uvaldo Christine OT at 10/17/23 1036

## 2023-10-18 LAB
ALBUMIN SERPL-MCNC: 2.8 G/DL (ref 3.5–5.2)
ALP SERPL-CCNC: 223 U/L (ref 39–117)
ALT SERPL W P-5'-P-CCNC: 39 U/L (ref 1–41)
ANION GAP SERPL CALCULATED.3IONS-SCNC: 7.1 MMOL/L (ref 5–15)
AST SERPL-CCNC: 32 U/L (ref 1–40)
BASOPHILS # BLD AUTO: 0.02 10*3/MM3 (ref 0–0.2)
BASOPHILS NFR BLD AUTO: 0.5 % (ref 0–1.5)
BILIRUB CONJ SERPL-MCNC: 0.2 MG/DL (ref 0–0.3)
BILIRUB INDIRECT SERPL-MCNC: 0.2 MG/DL
BILIRUB SERPL-MCNC: 0.4 MG/DL (ref 0–1.2)
BUN SERPL-MCNC: 15 MG/DL (ref 8–23)
BUN/CREAT SERPL: 24.2 (ref 7–25)
C COLI+JEJ+UPSA DNA STL QL NAA+NON-PROBE: NOT DETECTED
CALCIUM SPEC-SCNC: 8.3 MG/DL (ref 8.6–10.5)
CHLORIDE SERPL-SCNC: 106 MMOL/L (ref 98–107)
CO2 SERPL-SCNC: 21.9 MMOL/L (ref 22–29)
CREAT SERPL-MCNC: 0.62 MG/DL (ref 0.76–1.27)
DEPRECATED RDW RBC AUTO: 50.4 FL (ref 37–54)
EC STX1+STX2 GENES STL QL NAA+NON-PROBE: NOT DETECTED
EGFRCR SERPLBLD CKD-EPI 2021: 106.7 ML/MIN/1.73
EOSINOPHIL # BLD AUTO: 0.08 10*3/MM3 (ref 0–0.4)
EOSINOPHIL NFR BLD AUTO: 1.8 % (ref 0.3–6.2)
ERYTHROCYTE [DISTWIDTH] IN BLOOD BY AUTOMATED COUNT: 17 % (ref 12.3–15.4)
GLUCOSE BLDC GLUCOMTR-MCNC: 160 MG/DL (ref 70–99)
GLUCOSE BLDC GLUCOMTR-MCNC: 185 MG/DL (ref 70–99)
GLUCOSE BLDC GLUCOMTR-MCNC: 199 MG/DL (ref 70–99)
GLUCOSE BLDC GLUCOMTR-MCNC: 246 MG/DL (ref 70–99)
GLUCOSE SERPL-MCNC: 212 MG/DL (ref 65–99)
HCT VFR BLD AUTO: 30.8 % (ref 37.5–51)
HGB BLD-MCNC: 9.7 G/DL (ref 13–17.7)
IMM GRANULOCYTES # BLD AUTO: 0.01 10*3/MM3 (ref 0–0.05)
IMM GRANULOCYTES NFR BLD AUTO: 0.2 % (ref 0–0.5)
LYMPHOCYTES # BLD AUTO: 1.27 10*3/MM3 (ref 0.7–3.1)
LYMPHOCYTES NFR BLD AUTO: 28.7 % (ref 19.6–45.3)
MAGNESIUM SERPL-MCNC: 1.7 MG/DL (ref 1.6–2.4)
MCH RBC QN AUTO: 25.7 PG (ref 26.6–33)
MCHC RBC AUTO-ENTMCNC: 31.5 G/DL (ref 31.5–35.7)
MCV RBC AUTO: 81.7 FL (ref 79–97)
MONOCYTES # BLD AUTO: 0.49 10*3/MM3 (ref 0.1–0.9)
MONOCYTES NFR BLD AUTO: 11.1 % (ref 5–12)
NEUTROPHILS NFR BLD AUTO: 2.56 10*3/MM3 (ref 1.7–7)
NEUTROPHILS NFR BLD AUTO: 57.7 % (ref 42.7–76)
NRBC BLD AUTO-RTO: 0 /100 WBC (ref 0–0.2)
PHOSPHATE SERPL-MCNC: 3.9 MG/DL (ref 2.5–4.5)
PLATELET # BLD AUTO: 106 10*3/MM3 (ref 140–450)
PMV BLD AUTO: 11.5 FL (ref 6–12)
POTASSIUM SERPL-SCNC: 4.1 MMOL/L (ref 3.5–5.2)
PROT SERPL-MCNC: 5.4 G/DL (ref 6–8.5)
RBC # BLD AUTO: 3.77 10*6/MM3 (ref 4.14–5.8)
S ENT+BONG DNA STL QL NAA+NON-PROBE: NOT DETECTED
SHIGELLA SP+EIEC IPAH ST NAA+NON-PROBE: NOT DETECTED
SODIUM SERPL-SCNC: 135 MMOL/L (ref 136–145)
WBC NRBC COR # BLD: 4.43 10*3/MM3 (ref 3.4–10.8)

## 2023-10-18 PROCEDURE — 80076 HEPATIC FUNCTION PANEL: CPT | Performed by: FAMILY MEDICINE

## 2023-10-18 PROCEDURE — 82948 REAGENT STRIP/BLOOD GLUCOSE: CPT

## 2023-10-18 PROCEDURE — 80048 BASIC METABOLIC PNL TOTAL CA: CPT | Performed by: FAMILY MEDICINE

## 2023-10-18 PROCEDURE — 83735 ASSAY OF MAGNESIUM: CPT | Performed by: FAMILY MEDICINE

## 2023-10-18 PROCEDURE — 63710000001 INSULIN DETEMIR PER 5 UNITS: Performed by: INTERNAL MEDICINE

## 2023-10-18 PROCEDURE — 99232 SBSQ HOSP IP/OBS MODERATE 35: CPT | Performed by: FAMILY MEDICINE

## 2023-10-18 PROCEDURE — 84100 ASSAY OF PHOSPHORUS: CPT | Performed by: FAMILY MEDICINE

## 2023-10-18 PROCEDURE — 63710000001 INSULIN LISPRO (HUMAN) PER 5 UNITS: Performed by: INTERNAL MEDICINE

## 2023-10-18 PROCEDURE — 85025 COMPLETE CBC W/AUTO DIFF WBC: CPT | Performed by: FAMILY MEDICINE

## 2023-10-18 PROCEDURE — 97530 THERAPEUTIC ACTIVITIES: CPT

## 2023-10-18 RX ORDER — CHOLESTYRAMINE 4 G/9G
1 POWDER, FOR SUSPENSION ORAL EVERY 12 HOURS SCHEDULED
Status: DISCONTINUED | OUTPATIENT
Start: 2023-10-18 | End: 2023-11-06 | Stop reason: HOSPADM

## 2023-10-18 RX ADMIN — BACLOFEN 10 MG: 10 TABLET ORAL at 18:12

## 2023-10-18 RX ADMIN — HYDROCODONE BITARTRATE AND ACETAMINOPHEN 1 TABLET: 7.5; 325 TABLET ORAL at 14:22

## 2023-10-18 RX ADMIN — MICONAZOLE NITRATE 1 APPLICATION: 2 POWDER TOPICAL at 08:07

## 2023-10-18 RX ADMIN — INSULIN LISPRO 4 UNITS: 100 INJECTION, SOLUTION INTRAVENOUS; SUBCUTANEOUS at 17:45

## 2023-10-18 RX ADMIN — PREGABALIN 75 MG: 75 CAPSULE ORAL at 08:02

## 2023-10-18 RX ADMIN — HYDROCORTISONE 1 APPLICATION: 1 OINTMENT TOPICAL at 04:26

## 2023-10-18 RX ADMIN — INSULIN DETEMIR 45 UNITS: 100 INJECTION, SOLUTION SUBCUTANEOUS at 21:20

## 2023-10-18 RX ADMIN — PREGABALIN 75 MG: 75 CAPSULE ORAL at 21:21

## 2023-10-18 RX ADMIN — INSULIN LISPRO 5 UNITS: 100 INJECTION, SOLUTION INTRAVENOUS; SUBCUTANEOUS at 12:50

## 2023-10-18 RX ADMIN — IBUPROFEN 400 MG: 400 TABLET, FILM COATED ORAL at 03:52

## 2023-10-18 RX ADMIN — CHOLESTYRAMINE 1 PACKET: 4 POWDER, FOR SUSPENSION ORAL at 21:20

## 2023-10-18 RX ADMIN — MICONAZOLE NITRATE 1 APPLICATION: 2 POWDER TOPICAL at 21:21

## 2023-10-18 RX ADMIN — BACLOFEN 10 MG: 10 TABLET ORAL at 08:02

## 2023-10-18 RX ADMIN — APIXABAN 5 MG: 5 TABLET, FILM COATED ORAL at 21:20

## 2023-10-18 RX ADMIN — INSULIN LISPRO 5 UNITS: 100 INJECTION, SOLUTION INTRAVENOUS; SUBCUTANEOUS at 17:45

## 2023-10-18 RX ADMIN — INSULIN LISPRO 8 UNITS: 100 INJECTION, SOLUTION INTRAVENOUS; SUBCUTANEOUS at 21:33

## 2023-10-18 RX ADMIN — INSULIN DETEMIR 45 UNITS: 100 INJECTION, SOLUTION SUBCUTANEOUS at 08:01

## 2023-10-18 RX ADMIN — DIPHENOXYLATE HYDROCHLORIDE AND ATROPINE SULFATE 1 TABLET: 2.5; .025 TABLET ORAL at 04:27

## 2023-10-18 RX ADMIN — INSULIN LISPRO 4 UNITS: 100 INJECTION, SOLUTION INTRAVENOUS; SUBCUTANEOUS at 08:01

## 2023-10-18 RX ADMIN — PREGABALIN 75 MG: 75 CAPSULE ORAL at 15:48

## 2023-10-18 RX ADMIN — Medication: at 09:13

## 2023-10-18 RX ADMIN — FAMOTIDINE 40 MG: 20 TABLET, FILM COATED ORAL at 08:02

## 2023-10-18 RX ADMIN — LISINOPRIL 2.5 MG: 2.5 TABLET ORAL at 08:02

## 2023-10-18 RX ADMIN — CYANOCOBALAMIN TAB 500 MCG 1000 MCG: 500 TAB at 08:02

## 2023-10-18 RX ADMIN — BACITRACIN 0.9 G: 500 OINTMENT TOPICAL at 21:22

## 2023-10-18 RX ADMIN — Medication 250 MG: at 21:20

## 2023-10-18 RX ADMIN — EMPAGLIFLOZIN 10 MG: 10 TABLET, FILM COATED ORAL at 08:02

## 2023-10-18 RX ADMIN — BACITRACIN 0.9 G: 500 OINTMENT TOPICAL at 08:07

## 2023-10-18 RX ADMIN — HYDROCODONE BITARTRATE AND ACETAMINOPHEN 1 TABLET: 7.5; 325 TABLET ORAL at 21:33

## 2023-10-18 RX ADMIN — HYDROCODONE BITARTRATE AND ACETAMINOPHEN 1 TABLET: 7.5; 325 TABLET ORAL at 01:20

## 2023-10-18 RX ADMIN — Medication 250 MG: at 08:02

## 2023-10-18 RX ADMIN — CHOLESTYRAMINE 1 PACKET: 4 POWDER, FOR SUSPENSION ORAL at 09:12

## 2023-10-18 RX ADMIN — HYDROCORTISONE 1 APPLICATION: 1 OINTMENT TOPICAL at 21:21

## 2023-10-18 RX ADMIN — INSULIN LISPRO 4 UNITS: 100 INJECTION, SOLUTION INTRAVENOUS; SUBCUTANEOUS at 12:50

## 2023-10-18 RX ADMIN — APIXABAN 5 MG: 5 TABLET, FILM COATED ORAL at 08:02

## 2023-10-18 RX ADMIN — HYDROCODONE BITARTRATE AND ACETAMINOPHEN 1 TABLET: 7.5; 325 TABLET ORAL at 08:02

## 2023-10-18 RX ADMIN — INSULIN LISPRO 5 UNITS: 100 INJECTION, SOLUTION INTRAVENOUS; SUBCUTANEOUS at 08:02

## 2023-10-18 RX ADMIN — ATORVASTATIN CALCIUM 10 MG: 10 TABLET, FILM COATED ORAL at 21:20

## 2023-10-18 NOTE — PROGRESS NOTES
[facility]   Hospitalist Progress Note  Date: 10/18/2023  Patient Name: Jose Shaikh  : 1960  MRN: [id number]  Date of admission: 9/10/2023  Consultants:   -Orthopedic Surgery: Dr. Riki Davis    Subjective   Subjective     Chief Complaint: Weakness    Summary:   Jose Shaikh is a 62 y.o. male with multiple medical problems who was discharged from [facility] the day prior to admission and stated he could barely move or get out of his car so EMS was called.  Patient had hip and knee injections in hopes that this would help with his pain and allow him to work better with physical therapy and did some a little bit but he is unable to walk at this time and  is continue to try arrange rehab placement.  During hospitalization patient was evaluated by orthopedic surgery at patient's request for left total knee arthroplasty.  Per orthopedic surgery patient is not a candidate for left total knee arthroplasty due to morbid obesity, diabetes and chronic morbidities.  Patient has been here for a month waiting for placement which has been difficult due to a variety of barriers.    Interval Followup: Patient seen and examined resting comfortably continues to have diarrhea but improving.  Currently working on rehab placement    Review of systems: All systems reviewed and negative except following: Patient complains of generalized weakness fatigue and diarrhea  Objective   Objective     Vitals:   Temp:  [97.3 °F (36.3 °C)-97.9 °F (36.6 °C)] 97.6 °F (36.4 °C)  Heart Rate:  [83-88] 86  Resp:  [16] 16  BP: (110-128)/(56-73) 122/61  Physical Exam   Constitutional: Awake alert oriented no acute distress  Respiratory clear  Cardiovascular: RRR  GI: Abdomen soft nontender bowel sounds present  Result Review    Result Review:  I have personally reviewed the results as below and agree with these findings:  [x]  Laboratory:   CMP          10/11/2023    06:05 10/15/2023    05:00 10/18/2023    04:56   CMP    Glucose 129  143  212    BUN 17  18  15    Creatinine 0.52  0.48  0.62    EGFR 112.6  115.3  106.7    Sodium 133  136  135    Potassium 4.3  3.9  4.1    Chloride 102  105  106    Calcium 8.9  8.6  8.3    Total Protein   5.4    Albumin   2.8    Total Bilirubin   0.4    Alkaline Phosphatase   223    AST (SGOT)   32    ALT (SGPT)   39    BUN/Creatinine Ratio 32.7  37.5  24.2    Anion Gap 5.9  8.1  7.1      CBC          10/11/2023    06:05 10/15/2023    05:00 10/18/2023    04:56   CBC   WBC 5.58  6.30  4.43    RBC 4.54  4.19  3.77    Hemoglobin 11.6  10.6  9.7    Hematocrit 37.1  34.8  30.8    MCV 81.7  83.1  81.7    MCH 25.6  25.3  25.7    MCHC 31.3  30.5  31.5    RDW 17.4  18.1  17.0    Platelets 105  110  106    Blood glucose well controlled.  []  Microbiology:   []  Radiology:   []  EKG/Telemetry:    []  Cardiology/Vascular:    []  Pathology:  []  Old records:  []  Other:    Assessment & Plan   Assessment / Plan     Assessment/plan:  Generalized weakness  Deconditioning  Type 2 diabetes mellitus  Essential hypertension  Chronic paroxysmal atrial fibrillation on Eliquis  Obesity (BMI: 42.88)  Debility with wheelchair dependence  Severe degenerative joint disease of lower extremities  Chronic wound  Thrombocytopenia    Plan:    Patient with complaints of watery diarrhea C. difficile negative checking enteric bacterial panel negative  Continue Florastor  Continue as needed Imodium and scheduled Questran  If continues to have diarrhea tomorrow we will consider abdominal imaging  Recheck a.m. labs  Continue other medications as ordered  PT OT consultations  PT/OT/ consulted and following.  Placement has been difficult due to a variety of barriers.  Spoke with social work, they have already sent referrals to facilities outside of [place] and have still not heard back from any of them yet.  Will consider discharge home with home health if all efforts have been exhausted and patient is unable to be placed  later this week   Discussed with RN    DVT prophylaxis:  Medical DVT prophylaxis orders are present.    CODE STATUS:   Code Status (Patient has no pulse and is not breathing): CPR (Attempt to Resuscitate)  Medical Interventions (Patient has pulse or is breathing): Full Support  Electronically signed by HUSAM Jacobsen, 10/18/23, 2:01 PM EDT.      Attending documentation:  I reviewed the above documentation and independently reviewed and rounded and evaluated the patient and discussed the care plan with TALITA Lawrence PA-C, I agree with his findings and plan as documented, what I have added to the care plan and modified is as follows in my documentation and my medical decision making; 62-year-old male initially hospitalized on 9/10/2023, prolonged hospitalization for treatment of management of generalized weakness, deconditioning, with acute issues of diarrhea, C. difficile negative.  Diarrhea improved.  Had small hematoma after ambulating on his foot.  Interval follow-up: Seen and examined, complains of diarrhea, no acute distress, no acute major night events, watery bowel movements have subsided some.  Nursing staff report he has been intermittently refusing Questran.  No chest pain or palpitations.  Right foot has hematoma after ambulating.  Wound care following.  Review of systems obtained, all systems reviewed negative except generalized fatigue, generalized weakness, right foot hematoma.  Vitals reviewed, labs reviewed on physical exam well-appearing male, no acute distress, morbidly obese, regular rate and rhythm, clear auscultation bilaterally, soft nontender abdomen, no lower extreme edema, alert and oriented x3, disfigured right foot with amputations.  Assessment as above, plan, continue encourage use of Questran, continue mobilization per pathway, utilize Lomotil, still awaiting referrals for rehab, a.m. labs, full code, DVT prophylaxis with Eliquis, clinical course dictate further management, discussed  with nurse at the bedside.  More than 65 % of the time of this patient's encounter was performed by me, this included face-to-face time, planning and coordinating, medical decision making and critical thinking personally done by me.  -AVBMD    Electronically signed by Cailin Acosta MD, 10/18/23, 4:40 PM EDT.  Portions of this documentation were transcribed electronically from a voice recognition software.  I confirm all data accurately represents the service(s) I performed at today's visit.

## 2023-10-18 NOTE — PLAN OF CARE
Goal Outcome Evaluation:  Plan of Care Reviewed With: patient        Progress: no change  Outcome Evaluation: pt alert and oriented x4. wound/skin care done per orders. blood glucose monitored. medicated per orders. c/o of pain in the hips, medicated per orders. medicated for continued diarrhea. no other concerns noted at this time.

## 2023-10-18 NOTE — THERAPY TREATMENT NOTE
Acute Care - Physical Therapy Treatment Note  KEO Dominguez     Patient Name: Jose Shaikh  : 1960  MRN: [id number]  Today's Date: 10/18/2023      Visit Dx:     ICD-10-CM ICD-9-CM   1. Generalized weakness  R53.1 780.79   2. Hyponatremia  E87.1 276.1   3. Difficulty in walking  R26.2 719.7   4. Decreased activities of daily living (ADL)  Z78.9 V49.89   5. Difficulty walking  R26.2 719.7     Patient Active Problem List   Diagnosis    Diabetic ulcer of left heel associated with type 2 DM    Acute osteomyelitis of left calcaneus     Diabetic ulcer of left heel associated with type 2 DM    Diabetic ulcer of right midfoot associated with type 2 DM    Paroxysmal atrial fibrillation    Essential hypertension    Hyperlipidemia LDL goal <100    Cellulitis and abscess of foot    High alkaline phosphatase level    Osteomyelitis    Onychomycosis    Onychocryptosis    Foot pain, bilateral    Osteomyelitis of foot, right, acute    Cellulitis of right foot    Type 2 diabetes mellitus, with long-term current use of insulin    Class 3 severe obesity due to excess calories with serious comorbidity and body mass index (BMI) of 45.0 to 49.9 in adult    Anxiety disorder, unspecified    Claustrophobia    Dependence on wheelchair    Depression, unspecified    Long term (current) use of anticoagulants    Long term (current) use of oral hypoglycemic drugs    Wound of foot    Non-prs chronic ulcer oth prt r foot limited to brkdwn skin    Orthostatic hypotension    Other chronic osteomyelitis, right ankle and foot    Personal history of nicotine dependence    Thrombocytopenia, unspecified    Unspecified open wound, right foot, initial encounter    Diabetic foot infection    Subacute osteomyelitis of right foot    Right foot pain    Sepsis    Onychomycosis    Foot pain, left    Impaired mobility and ADLs    Absence of toe of right foot    Corns and callosity    Disability of walking    Fracture    Limb swelling    Polyneuropathy     Pressure ulcer, stage 1    Shortness of breath    Generalized weakness     Past Medical History:   Diagnosis Date    Absence of toe of right foot     Acute osteomyelitis of left calcaneus  8/18/2021    Anxiety and depression     Arthritis     Claustrophobia     Corns and callus     Diabetic ulcer of left heel associated with type 2 DM 8/18/2021    Diabetic ulcer of left heel associated with type 2 DM 7/6/2021    Diabetic ulcer of right midfoot associated with type 2 DM 8/18/2021    Difficulty walking     Essential hypertension 8/31/2021    Hammertoe     Hyperlipidemia LDL goal <100 8/31/2021    Ingrown toenail     Obesity     Paroxysmal atrial fibrillation 8/31/2021    Polyneuropathy     Pressure ulcer, stage 1     Tinea unguium     Type 2 diabetes mellitus with polyneuropathy      Past Surgical History:   Procedure Laterality Date    CYST REMOVAL      center of back; benign    INCISION AND DRAINAGE ABSCESS      back    INCISION AND DRAINAGE LEG Right 12/10/2021    Procedure: INCISION AND DRAINAGE LOWER EXTREMITY;  Surgeon: Ash Leyva DPM;  Location: [facility];  Service: Podiatry;  Laterality: Right;    OTHER SURGICAL HISTORY      Surgical clips left foot    TOE SURGERY Right     Removal of 5th toe    TRANS METATARSAL AMPUTATION Right 12/2/2021    Procedure: AMPUTATION TRANS METATARSAL;  Surgeon: Ash Leyva DPM;  Location: [facility] OR;  Service: Podiatry;  Laterality: Right;    WRIST SURGERY Left     repair of injury     PT Assessment (last 12 hours)       PT Evaluation and Treatment       Row Name 10/18/23 1100          Physical Therapy Time and Intention    Subjective Information complains of;weakness;pain (P)   -RH     Document Type therapy note (daily note) (P)   -RH     Mode of Treatment individual therapy;physical therapy (P)   -RH     Patient Effort good (P)   -RH     Symptoms Noted During/After Treatment increased pain (P)   -RH       Row Name 10/18/23 1100          General  Information    Patient Profile Reviewed yes (P)   -RH     Patient Observations alert;cooperative;agree to therapy (P)   -RH       Row Name 10/18/23 1100          Bed Mobility    Bed Mobility supine-sit;sit-supine (P)   -RH     Supine-Sit [place] (Bed Mobility) minimum assist (75% patient effort) (P)   -RH     Sit-Supine [place] (Bed Mobility) minimum assist (75% patient effort) (P)   -RH     Bed Mobility, Safety Issues decreased use of legs for bridging/pushing;decreased use of arms for pushing/pulling (P)   -RH     Assistive Device (Bed Mobility) bed rails;draw sheet;head of bed elevated (P)   -RH       Row Name 10/18/23 1100          Transfers    Transfers sit-stand transfer;stand-sit transfer (P)   -       Row Name 10/18/23 1100          Sit-Stand Transfer    Sit-Stand [place] (Transfers) minimum assist (75% patient effort);verbal cues (P)   -RH     Assistive Device (Sit-Stand Transfers) walker, front-wheeled (P)   -       Row Name 10/18/23 1100          Stand-Sit Transfer    Stand-Sit [place] (Transfers) minimum assist (75% patient effort) (P)   -RH     Assistive Device (Stand-Sit Transfers) walker, front-wheeled (P)   -       Row Name 10/18/23 1100          Gait/Stairs (Locomotion)    Patient was able to Ambulate no, other medical factors prevent ambulation (P)   -RH     Reason Patient was unable to Ambulate Excessive Weakness;Uncontrolled Pain (P)   -RH     Gait Assessment/Intervention Patient was not able to ambulate, but he did tolerate standing x2 minutes with lateral weight shifts throughout the time standing with CGA and a rolling walker for safety. (P)   -       Row Name 10/18/23 1100          Balance    Balance Assessment standing dynamic balance (P)   -RH     Dynamic Standing Balance contact guard (P)   -RH     Position/Device Used, Standing Balance supported;walker, front-wheeled (P)   -       Row Name             Wound 12/02/21 Right anterior foot Incision    Wound -  Properties Group Placement Date: 12/02/21  -ES Side: Right  -ES Orientation: anterior  -ES Location: foot  -ES Primary Wound Type: Incision  -ES    Retired Wound - Properties Group Placement Date: 12/02/21  -ES Side: Right  -ES Orientation: anterior  -ES Location: foot  -ES Primary Wound Type: Incision  -ES    Retired Wound - Properties Group Date first assessed: 12/02/21  -ES Side: Right  -ES Location: foot  -ES Primary Wound Type: Incision  -ES      Row Name             Wound 10/17/23 0546 Bilateral perirectal MASD (Moisture associated skin damage)    Wound - Properties Group Placement Date: 10/17/23  -AS Placement Time: 0546  -AS Present on Original Admission: N  -AS Side: Bilateral  -AS Location: perirectal  -AS Primary Wound Type: MASD  -AS    Retired Wound - Properties Group Placement Date: 10/17/23  -AS Placement Time: 0546  -AS Present on Original Admission: N  -AS Side: Bilateral  -AS Location: perirectal  -AS Primary Wound Type: MASD  -AS    Retired Wound - Properties Group Date first assessed: 10/17/23  -AS Time first assessed: 0546  -AS Present on Original Admission: N  -AS Side: Bilateral  -AS Location: perirectal  -AS Primary Wound Type: MASD  -AS      Row Name 10/18/23 1100          Progress Summary (PT)    Progress Toward Functional Goals (PT) progress toward functional goals is fair (P)   -RH     Daily Progress Summary (PT) Patient was able to tolerate more functional therapeutic activity today. He stood at edge of bed for 2 minutes and performed lateral weight shifts with CGA and a rolling walker. His bed mobility and transfer assistance levels have improved as he is now a Rich for all. (P)   -RH               User Key  (r) = Recorded By, (t) = Taken By, (c) = Cosigned By      Initials Name Provider Type    AS Angelina Alfredo, RN Registered Nurse    ES Katlyn Escobar RN Registered Nurse    Gamal Calero, MERLIN Student PT Student                    Physical Therapy Education       Title: PT  OT SLP Therapies (Done)       Topic: Physical Therapy (Done)       Point: Mobility training (Done)       Learning Progress Summary             Patient Acceptance, E, VU,NR by RASHARD at 9/24/2023 0604    Acceptance, E, VU,DU by RF at 9/18/2023 1537    Acceptance, E, VU,DU by RF at 9/17/2023 1515    Acceptance, E, VU,DU by RF at 9/16/2023 1804    Acceptance, E, VU by AV at 9/13/2023 1059    Acceptance, E,TB, VU by  at 9/12/2023 1308                         Point: Home exercise program (Done)       Learning Progress Summary             Patient Acceptance, E, VU,NR by RASHARD at 9/24/2023 0604    Acceptance, E, VU,DU by RF at 9/18/2023 1537    Acceptance, E, VU,DU by RF at 9/17/2023 1515    Acceptance, E, VU,DU by RF at 9/16/2023 1804    Acceptance, E, VU by AV at 9/13/2023 1059    Acceptance, E,TB, VU by  at 9/12/2023 1308                         Point: Body mechanics (Done)       Learning Progress Summary             Patient Acceptance, E, VU,NR by RASHARD at 9/24/2023 0604    Acceptance, E, VU,DU by RF at 9/18/2023 1537    Acceptance, E, VU,DU by RF at 9/17/2023 1515    Acceptance, E, VU,DU by RF at 9/16/2023 1804    Acceptance, E, VU by AV at 9/13/2023 1059    Acceptance, E,TB, VU by  at 9/12/2023 1308                         Point: Precautions (Done)       Learning Progress Summary             Patient Acceptance, E, VU,NR by RASHARD at 9/24/2023 0604    Acceptance, E, VU,DU by RF at 9/18/2023 1537    Acceptance, E, VU,DU by RF at 9/17/2023 1515    Acceptance, E, VU,DU by RF at 9/16/2023 1804    Acceptance, E, VU by AV at 9/13/2023 1059    Acceptance, E,TB, VU by  at 9/12/2023 1308                                         User Key       Initials Effective Dates Name Provider Type Discipline    RASHARD 06/16/21 -  Selena Lindquist, RN Registered Nurse Nurse    AV 06/16/21 -  Uvaldo Christine OT Occupational Therapist OT    RF 01/19/22 -  Karl Baker, RN Registered Nurse Nurse     08/16/23 -  Gamal Simmons, MERLIN Student PT  Student PT                  PT Recommendation and Plan  Anticipated Discharge Disposition (PT): inpatient rehabilitation facility  Planned Therapy Interventions (PT): balance training, bed mobility training, gait training, home exercise program, neuromuscular re-education, motor coordination training, ROM (range of motion), stair training, strengthening, stretching, transfer training  Therapy Frequency (PT): daily  Progress Summary (PT)  Progress Toward Functional Goals (PT): (P) progress toward functional goals is fair  Daily Progress Summary (PT): (P) Patient was able to tolerate more functional therapeutic activity today. He stood at edge of bed for 2 minutes and performed lateral weight shifts with CGA and a rolling walker. His bed mobility and transfer assistance levels have improved as he is now a Rich for all.  Plan of Care Reviewed With: patient  Outcome Evaluation: Patient presents with severe bilateral LE strength deficits and endurance impairments that have affected his functional mobility status below his baseline. He reports that he has been NWB for weeks but was able to get around independently with an assistive device prior to that. Skilled physical therapy is required to address his deficits.   Outcome Measures       Row Name 10/18/23 1100             How much help from another person do you currently need...    Turning from your back to your side while in flat bed without using bedrails? 3 (P)   -RH      Moving from lying on back to sitting on the side of a flat bed without bedrails? 3 (P)   -RH      Moving to and from a bed to a chair (including a wheelchair)? 2 (P)   -RH      Standing up from a chair using your arms (e.g., wheelchair, bedside chair)? 3 (P)   -RH      Climbing 3-5 steps with a railing? 1 (P)   -RH      To walk in hospital room? 1 (P)   -RH      AM-PAC 6 Clicks Score (PT) 13 (P)   -RH      Highest level of mobility 4 --> Transferred to chair/commode (P)   -RH                User Key   (r) = Recorded By, (t) = Taken By, (c) = Cosigned By      Initials Name Provider Type     Gamal Simmons, PT Student PT Student                     Time Calculation:    PT Charges       Row Name 10/18/23 1102             Time Calculation    PT Received On 10/18/23 (P)   -RH         Timed Charges    17474 - PT Therapeutic Activity Minutes 15 (P)   -RH         Total Minutes    Timed Charges Total Minutes 15 (P)   -RH       Total Minutes 15 (P)   -RH                User Key  (r) = Recorded By, (t) = Taken By, (c) = Cosigned By      Initials Name Provider Type     Gamal Simmons, PT Student PT Student                      PT G-Codes  Outcome Measure Options: AM-PAC 6 Clicks Daily Activity (OT), Optimal Instrument  AM-PAC 6 Clicks Score (PT): (P) 13  AM-PAC 6 Clicks Score (OT): 15    Gamal Simmons PT Student  10/18/2023

## 2023-10-18 NOTE — SIGNIFICANT NOTE
Wound Eval / Progress Noted    KEO Dominguez     Patient Name: Jose Shaikh  : 1960  MRN: [id number]  Today's Date: 10/18/2023                 Admit Date: 9/10/2023    Visit Dx:    ICD-10-CM ICD-9-CM   1. Generalized weakness  R53.1 780.79   2. Hyponatremia  E87.1 276.1   3. Difficulty in walking  R26.2 719.7   4. Decreased activities of daily living (ADL)  Z78.9 V49.89   5. Difficulty walking  R26.2 719.7         Generalized weakness    Diabetic ulcer of left heel associated with type 2 DM    Paroxysmal atrial fibrillation    Essential hypertension    Foot pain, bilateral    Type 2 diabetes mellitus, with long-term current use of insulin    Class 3 severe obesity due to excess calories with serious comorbidity and body mass index (BMI) of 45.0 to 49.9 in adult    Dependence on wheelchair        Past Medical History:   Diagnosis Date    Absence of toe of right foot     Acute osteomyelitis of left calcaneus  2021    Anxiety and depression     Arthritis     Claustrophobia     Corns and callus     Diabetic ulcer of left heel associated with type 2 DM 2021    Diabetic ulcer of left heel associated with type 2 DM 2021    Diabetic ulcer of right midfoot associated with type 2 DM 2021    Difficulty walking     Essential hypertension 2021    Hammertoe     Hyperlipidemia LDL goal <100 2021    Ingrown toenail     Obesity     Paroxysmal atrial fibrillation 2021    Polyneuropathy     Pressure ulcer, stage 1     Tinea unguium     Type 2 diabetes mellitus with polyneuropathy         Past Surgical History:   Procedure Laterality Date    CYST REMOVAL      center of back; benign    INCISION AND DRAINAGE ABSCESS      back    INCISION AND DRAINAGE LEG Right 12/10/2021    Procedure: INCISION AND DRAINAGE LOWER EXTREMITY;  Surgeon: Ash Leyva DPM;  Location: [facility];  Service: Podiatry;  Laterality: Right;    OTHER SURGICAL HISTORY      Surgical clips left foot    TOE SURGERY  Right     Removal of 5th toe    TRANS METATARSAL AMPUTATION Right 12/2/2021    Procedure: AMPUTATION TRANS METATARSAL;  Surgeon: Ash Leyva DPM;  [facility]: [facility];  Service: Podiatry;  Laterality: Right;    WRIST SURGERY Left     repair of injury         Physical Assessment:  Wound 12/02/21 Right anterior foot Incision (Active)   Wound Image   10/18/23 1140   Dressing Appearance intact;dry 10/18/23 1140   Closure None 10/18/23 1140   Base epithelialization;pink;maroon/purple 10/18/23 1140   Periwound intact;blistered;dry 10/18/23 1140   Periwound Temperature warm 10/18/23 1140   Periwound Skin Turgor soft 10/18/23 1140   Edges rolled/closed 10/18/23 1140   Drainage Characteristics/Odor serosanguineous 10/18/23 1140   Drainage Amount scant 10/18/23 1140   Care, Wound cleansed with;sterile normal saline 10/18/23 1140   Dressing Care dressing applied;silver impregnated;hydrofiber;gauze, dry;gauze;tubular wrap 10/18/23 1140   Periwound Care absorptive dressing applied 10/18/23 1140       Wound Check / Follow-up:  Patient seen today for a wound check and dressing change. Patient had worked with PT prior to the arrival to the room; patient reports that he hit his foot causing it to bleed. Expressed that he was upset that this occurred because he believe that this would prevent him from being to continue to work with PT in the future.   Overall the tissue has improved since admission; newly formed hematoma to the right forefoot with maroon / purple pocket of sanguineous fluid noted. Small trickle of sanguineous drainage noted to the tissue. Cleansed gentle with NS. Covered with silver impregnated hydrofiber. Recommend daily dressing changes with non-adherent petroleum gauze to the tissue.  Buttocks is intact but with blanchable redness noted. Continue encouraging the patient to turn every two hours and apply barrier creams as needed.  Groin and abdominal wounds much improved; tissue is pink but overall  much better. Recommend to continue current skin care.    Short term goals:  regain skin integrity, skin protection, daily dressing change,topical treatment    Karon Bolton RN    10/18/2023    15:40 EDT

## 2023-10-18 NOTE — PLAN OF CARE
Goal Outcome Evaluation:  Plan of Care Reviewed With: patient        Progress: no change  Outcome Evaluation: patient alert and oriented x4. wound and skin care provided per orders. blood glucose monitored this shift. medicated per orders this shift. PRN medication administered per mar. patient refuses frequent turns, education provided. no new issues/needs at this time.

## 2023-10-19 LAB
GLUCOSE BLDC GLUCOMTR-MCNC: 132 MG/DL (ref 70–99)
GLUCOSE BLDC GLUCOMTR-MCNC: 176 MG/DL (ref 70–99)
GLUCOSE BLDC GLUCOMTR-MCNC: 197 MG/DL (ref 70–99)
GLUCOSE BLDC GLUCOMTR-MCNC: 256 MG/DL (ref 70–99)

## 2023-10-19 PROCEDURE — 82948 REAGENT STRIP/BLOOD GLUCOSE: CPT

## 2023-10-19 PROCEDURE — 99232 SBSQ HOSP IP/OBS MODERATE 35: CPT | Performed by: FAMILY MEDICINE

## 2023-10-19 PROCEDURE — 63710000001 INSULIN DETEMIR PER 5 UNITS: Performed by: INTERNAL MEDICINE

## 2023-10-19 PROCEDURE — 63710000001 INSULIN LISPRO (HUMAN) PER 5 UNITS: Performed by: INTERNAL MEDICINE

## 2023-10-19 PROCEDURE — 97110 THERAPEUTIC EXERCISES: CPT

## 2023-10-19 RX ORDER — LISINOPRIL 2.5 MG/1
2.5 TABLET ORAL
Qty: 30 TABLET | Refills: 0 | Status: ON HOLD | OUTPATIENT
Start: 2023-10-20 | End: 2023-11-19

## 2023-10-19 RX ORDER — FAMOTIDINE 40 MG/1
40 TABLET, FILM COATED ORAL DAILY
Qty: 30 TABLET | Refills: 0 | Status: ON HOLD | OUTPATIENT
Start: 2023-10-20 | End: 2023-11-19

## 2023-10-19 RX ADMIN — INSULIN DETEMIR 45 UNITS: 100 INJECTION, SOLUTION SUBCUTANEOUS at 08:31

## 2023-10-19 RX ADMIN — EMPAGLIFLOZIN 10 MG: 10 TABLET, FILM COATED ORAL at 08:32

## 2023-10-19 RX ADMIN — Medication 250 MG: at 21:43

## 2023-10-19 RX ADMIN — PREGABALIN 75 MG: 75 CAPSULE ORAL at 15:16

## 2023-10-19 RX ADMIN — HYDROCODONE BITARTRATE AND ACETAMINOPHEN 1 TABLET: 7.5; 325 TABLET ORAL at 15:16

## 2023-10-19 RX ADMIN — PREGABALIN 75 MG: 75 CAPSULE ORAL at 08:33

## 2023-10-19 RX ADMIN — BACLOFEN 10 MG: 10 TABLET ORAL at 15:16

## 2023-10-19 RX ADMIN — APIXABAN 5 MG: 5 TABLET, FILM COATED ORAL at 08:33

## 2023-10-19 RX ADMIN — BACITRACIN 0.9 G: 500 OINTMENT TOPICAL at 08:34

## 2023-10-19 RX ADMIN — Medication: at 10:46

## 2023-10-19 RX ADMIN — MICONAZOLE NITRATE 1 APPLICATION: 2 POWDER TOPICAL at 21:45

## 2023-10-19 RX ADMIN — CYANOCOBALAMIN TAB 500 MCG 1000 MCG: 500 TAB at 10:46

## 2023-10-19 RX ADMIN — SIMETHICONE 80 MG: 80 TABLET, CHEWABLE ORAL at 05:10

## 2023-10-19 RX ADMIN — CHOLESTYRAMINE 1 PACKET: 4 POWDER, FOR SUSPENSION ORAL at 08:33

## 2023-10-19 RX ADMIN — HYDROCODONE BITARTRATE AND ACETAMINOPHEN 1 TABLET: 7.5; 325 TABLET ORAL at 08:44

## 2023-10-19 RX ADMIN — Medication 250 MG: at 08:32

## 2023-10-19 RX ADMIN — BACLOFEN 10 MG: 10 TABLET ORAL at 01:41

## 2023-10-19 RX ADMIN — INSULIN LISPRO 12 UNITS: 100 INJECTION, SOLUTION INTRAVENOUS; SUBCUTANEOUS at 12:46

## 2023-10-19 RX ADMIN — DIPHENOXYLATE HYDROCHLORIDE AND ATROPINE SULFATE 1 TABLET: 2.5; .025 TABLET ORAL at 21:43

## 2023-10-19 RX ADMIN — ATORVASTATIN CALCIUM 10 MG: 10 TABLET, FILM COATED ORAL at 21:43

## 2023-10-19 RX ADMIN — BACITRACIN 0.9 G: 500 OINTMENT TOPICAL at 21:45

## 2023-10-19 RX ADMIN — DIPHENOXYLATE HYDROCHLORIDE AND ATROPINE SULFATE 1 TABLET: 2.5; .025 TABLET ORAL at 01:41

## 2023-10-19 RX ADMIN — DIPHENOXYLATE HYDROCHLORIDE AND ATROPINE SULFATE 1 TABLET: 2.5; .025 TABLET ORAL at 08:44

## 2023-10-19 RX ADMIN — INSULIN LISPRO 5 UNITS: 100 INJECTION, SOLUTION INTRAVENOUS; SUBCUTANEOUS at 08:31

## 2023-10-19 RX ADMIN — HYDROCODONE BITARTRATE AND ACETAMINOPHEN 1 TABLET: 7.5; 325 TABLET ORAL at 21:43

## 2023-10-19 RX ADMIN — PREGABALIN 75 MG: 75 CAPSULE ORAL at 21:43

## 2023-10-19 RX ADMIN — MICONAZOLE NITRATE 1 APPLICATION: 2 POWDER TOPICAL at 08:33

## 2023-10-19 RX ADMIN — FAMOTIDINE 40 MG: 20 TABLET, FILM COATED ORAL at 08:32

## 2023-10-19 RX ADMIN — INSULIN LISPRO 5 UNITS: 100 INJECTION, SOLUTION INTRAVENOUS; SUBCUTANEOUS at 12:46

## 2023-10-19 RX ADMIN — LISINOPRIL 2.5 MG: 2.5 TABLET ORAL at 08:32

## 2023-10-19 RX ADMIN — DIPHENOXYLATE HYDROCHLORIDE AND ATROPINE SULFATE 1 TABLET: 2.5; .025 TABLET ORAL at 15:16

## 2023-10-19 RX ADMIN — INSULIN DETEMIR 45 UNITS: 100 INJECTION, SOLUTION SUBCUTANEOUS at 21:43

## 2023-10-19 RX ADMIN — CHOLESTYRAMINE 1 PACKET: 4 POWDER, FOR SUSPENSION ORAL at 21:43

## 2023-10-19 RX ADMIN — INSULIN LISPRO 4 UNITS: 100 INJECTION, SOLUTION INTRAVENOUS; SUBCUTANEOUS at 08:30

## 2023-10-19 RX ADMIN — APIXABAN 5 MG: 5 TABLET, FILM COATED ORAL at 21:43

## 2023-10-19 RX ADMIN — INSULIN LISPRO 5 UNITS: 100 INJECTION, SOLUTION INTRAVENOUS; SUBCUTANEOUS at 17:54

## 2023-10-19 NOTE — PLAN OF CARE
Goal Outcome Evaluation:  Plan of Care Reviewed With: patient        Progress: no change  Outcome Evaluation: Pt c/o pain/discomfort this shift, administered prn pain med as ordered. Skin and wound care provided as ordered. Pt refused turns/repositions at times, reinforced importance of repositioning every 2H to maintain skin integrity, pt still refused intervention. Pending placement at this time. No new issues or new needs noted at this time.

## 2023-10-19 NOTE — CASE MANAGEMENT/SOCIAL WORK
"1530 RECEIVED CALL FROM SAMPSON MONTAÑO THAT PATIENT IS WANTING TO APPEAL DISCHARGE    1537 FAN FROM [facility] CALLED TO INITIATE DISCHARGE APPEAL PROCESS AND REQUEST RECORDS FOR THE APPEAL - [facility] AND CASE ID #[id number]    1557 DISCHARGE ORDER WAS PLACED    1607 10/19/23 IM AND DNOD, AND copy of \"42 Code of Federal Regulations 411.15 (g)/(k) ALL GIVEN TO PATIENT AND EXPLAINED THE APPEAL PROCESS.    1615 FAXED DNOD TO JOSE ENRIQUE ARZOLA RN, MANAGER    1618 FAXED IM, DNOD AND [facility] REQUEST FOR RECORDS TO CHRISTIE NICHOLAS AND ALSO [facility].   "

## 2023-10-19 NOTE — THERAPY TREATMENT NOTE
Acute Care - Physical Therapy Treatment Note  KE[facility] Dominguez     Patient Name: Jose Shaikh  : 1960  MRN: [id number]  Today's Date: 10/19/2023      Visit Dx:     ICD-10-CM ICD-9-CM   1. Generalized weakness  R53.1 780.79   2. Hyponatremia  E87.1 276.1   3. Difficulty in walking  R26.2 719.7   4. Decreased activities of daily living (ADL)  Z78.9 V49.89   5. Difficulty walking  R26.2 719.7     Patient Active Problem List   Diagnosis    Diabetic ulcer of left heel associated with type 2 DM    Acute osteomyelitis of left calcaneus     Diabetic ulcer of left heel associated with type 2 DM    Diabetic ulcer of right midfoot associated with type 2 DM    Paroxysmal atrial fibrillation    Essential hypertension    Hyperlipidemia LDL goal <100    Cellulitis and abscess of foot    High alkaline phosphatase level    Osteomyelitis    Onychomycosis    Onychocryptosis    Foot pain, bilateral    Osteomyelitis of foot, right, acute    Cellulitis of right foot    Type 2 diabetes mellitus, with long-term current use of insulin    Class 3 severe obesity due to excess calories with serious comorbidity and body mass index (BMI) of 45.0 to 49.9 in adult    Anxiety disorder, unspecified    Claustrophobia    Dependence on wheelchair    Depression, unspecified    Long term (current) use of anticoagulants    Long term (current) use of oral hypoglycemic drugs    Wound of foot    Non-prs chronic ulcer oth prt r foot limited to brkdwn skin    Orthostatic hypotension    Other chronic osteomyelitis, right ankle and foot    Personal history of nicotine dependence    Thrombocytopenia, unspecified    Unspecified open wound, right foot, initial encounter    Diabetic foot infection    Subacute osteomyelitis of right foot    Right foot pain    Sepsis    Onychomycosis    Foot pain, left    Impaired mobility and ADLs    Absence of toe of right foot    Corns and callosity    Disability of walking    Fracture    Limb swelling    Polyneuropathy     Pressure ulcer, stage 1    Shortness of breath    Generalized weakness     Past Medical History:   Diagnosis Date    Absence of toe of right foot     Acute osteomyelitis of left calcaneus  8/18/2021    Anxiety and depression     Arthritis     Claustrophobia     Corns and callus     Diabetic ulcer of left heel associated with type 2 DM 8/18/2021    Diabetic ulcer of left heel associated with type 2 DM 7/6/2021    Diabetic ulcer of right midfoot associated with type 2 DM 8/18/2021    Difficulty walking     Essential hypertension 8/31/2021    Hammertoe     Hyperlipidemia LDL goal <100 8/31/2021    Ingrown toenail     Obesity     Paroxysmal atrial fibrillation 8/31/2021    Polyneuropathy     Pressure ulcer, stage 1     Tinea unguium     Type 2 diabetes mellitus with polyneuropathy      Past Surgical History:   Procedure Laterality Date    CYST REMOVAL      center of back; benign    INCISION AND DRAINAGE ABSCESS      back    INCISION AND DRAINAGE LEG Right 12/10/2021    Procedure: INCISION AND DRAINAGE LOWER EXTREMITY;  Surgeon: Ash Leyva DPM;  Location: [facility];  Service: Podiatry;  Laterality: Right;    OTHER SURGICAL HISTORY      Surgical clips left foot    TOE SURGERY Right     Removal of 5th toe    TRANS METATARSAL AMPUTATION Right 12/2/2021    Procedure: AMPUTATION TRANS METATARSAL;  Surgeon: Ash Leyva DPM;  Location: [facility] OR;  Service: Podiatry;  Laterality: Right;    WRIST SURGERY Left     repair of injury     PT Assessment (last 12 hours)       PT Evaluation and Treatment       Row Name 10/19/23 1500          Physical Therapy Time and Intention    Subjective Information complains of;fatigue;pain (P)   -ZT     Document Type therapy note (daily note) (P)   -ZT     Mode of Treatment individual therapy;physical therapy (P)   -ZT     Patient Effort good (P)   -ZT       Row Name 10/19/23 1500          General Information    Patient Profile Reviewed yes (P)   -ZT     Patient  Observations alert;cooperative;agree to therapy (P)   -ZT     Existing Precautions/Restrictions fall (P)   -ZT       Row Name 10/19/23 1500          Bed Mobility    Bed Mobility other (see comments) (P)   Pt performed TherEx in supine  -       Row Name 10/19/23 1500          Transfers    Transfers other (see comments) (P)   Pt declined Tfs  -       Row Name 10/19/23 1500          Gait/Stairs (Locomotion)    Gait/Stairs Locomotion other (see comments) (P)   Pt declined AMB  -       Row Name 10/19/23 1500          Safety Issues, Functional Mobility    Impairments Affecting Function (Mobility) balance;endurance/activity tolerance;strength (P)   -       Row Name 10/19/23 1500          Balance    Balance Assessment other (see comments) (P)   Pt performed TherEx in supine  -       Row Name 10/19/23 1500          Motor Skills    Therapeutic Exercise hip;knee;ankle (P)   -       Row Name 10/19/23 1500          Hip (Therapeutic Exercise)    Hip Strengthening (Therapeutic Exercise) aBduction;aDduction;20 repititions;other (see comments) (P)   Pt performed 20 reps of hip adduction/abduction and glute sets  -       Row Name 10/19/23 1500          Knee (Therapeutic Exercise)    Knee Strengthening (Therapeutic Exercise) SLR (straight leg raise);20 repititions;other (see comments);10 repetitions (P)   Pt performed 20 reps of quad sets and 10 reps of SLR  -       Row Name 10/19/23 1500          Ankle (Therapeutic Exercise)    Ankle Strengthening (Therapeutic Exercise) dorsiflexion;plantarflexion;20 repititions;other (see comments) (P)   Pt performed 20 ankle pumps  -       Row Name             Wound 12/02/21 Right anterior foot Incision    Wound - Properties Group Placement Date: 12/02/21  -ES Side: Right  -ES Orientation: anterior  -ES Location: foot  -ES Primary Wound Type: Incision  -ES    Retired Wound - Properties Group Placement Date: 12/02/21  -ES Side: Right  -ES Orientation: anterior  -ES Location: foot   -ES Primary Wound Type: Incision  -ES    Retired Wound - Properties Group Date first assessed: 12/02/21  -ES Side: Right  -ES Location: foot  -ES Primary Wound Type: Incision  -ES      Row Name             Wound 10/17/23 0546 Bilateral perirectal MASD (Moisture associated skin damage)    Wound - Properties Group Placement Date: 10/17/23  -AS Placement Time: 0546  -AS Present on Original Admission: N  -AS Side: Bilateral  -AS Location: perirectal  -AS Primary Wound Type: MASD  -AS    Retired Wound - Properties Group Placement Date: 10/17/23  -AS Placement Time: 0546  -AS Present on Original Admission: N  -AS Side: Bilateral  -AS Location: perirectal  -AS Primary Wound Type: MASD  -AS    Retired Wound - Properties Group Date first assessed: 10/17/23  -AS Time first assessed: 0546  -AS Present on Original Admission: N  -AS Side: Bilateral  -AS Location: perirectal  -AS Primary Wound Type: MASD  -AS      Row Name 10/19/23 1500          Plan of Care Review    Plan of Care Reviewed With patient (P)   -ZT       Row Name 10/19/23 1500          Therapy Assessment/Plan (PT)    Rehab Potential (PT) good, to achieve stated therapy goals (P)   -ZT     Criteria for Skilled Interventions Met (PT) yes;skilled treatment is necessary (P)   -ZT     Therapy Frequency (PT) daily (P)   -ZT     Problem List (PT) problems related to;balance;mobility;strength (P)   -ZT     Activity Limitations Related to Problem List (PT) unable to ambulate safely;unable to transfer safely (P)   -ZT       Row Name 10/19/23 1500          Progress Summary (PT)    Progress Toward Functional Goals (PT) progress toward functional goals is good (P)   -ZT     Daily Progress Summary (PT) Pt presents in a deconditioned state secondary to recent hospital stay. He has LE pain that makes it difficult for him to move in bed. He can only tolerate small amounts of exercise before he requires a break. He continues to require skilled PT services to address these deficits so  that he can safely perform ADL's. (P)   -ZT       Row Name 10/19/23 1500          Therapy Plan Review/Discharge Plan (PT)    Therapy Plan Review (PT) evaluation/treatment results reviewed;care plan/treatment goals reviewed;participants included;patient (P)   -ZT               User Key  (r) = Recorded By, (t) = Taken By, (c) = Cosigned By      Initials Name Provider Type    AS Angelina Alfredo, RN Registered Nurse    Katlyn Garcia RN Registered Nurse    Oscar Ward, PT Student PT Student                    Physical Therapy Education       Title: PT OT SLP Therapies (Done)       Topic: Physical Therapy (Done)       Point: Mobility training (Done)       Learning Progress Summary             Patient Acceptance, E, VU,NR by RASHARD at 9/24/2023 0604    Acceptance, E, VU,DU by  at 9/18/2023 1537    Acceptance, E, VU,DU by RF at 9/17/2023 1515    Acceptance, E, VU,DU by  at 9/16/2023 1804    Acceptance, E, VU by AV at 9/13/2023 1059    Acceptance, E,TB, VU by  at 9/12/2023 1308                         Point: Home exercise program (Done)       Learning Progress Summary             Patient Acceptance, E, VU,NR by RASHARD at 9/24/2023 0604    Acceptance, E, VU,DU by  at 9/18/2023 1537    Acceptance, E, VU,DU by RF at 9/17/2023 1515    Acceptance, E, VU,DU by  at 9/16/2023 1804    Acceptance, E, VU by AV at 9/13/2023 1059    Acceptance, E,TB, VU by  at 9/12/2023 1308                         Point: Body mechanics (Done)       Learning Progress Summary             Patient Acceptance, E, VU,NR by RASHARD at 9/24/2023 0604    Acceptance, E, VU,DU by  at 9/18/2023 1537    Acceptance, E, VU,DU by  at 9/17/2023 1515    Acceptance, E, VU,DU by RF at 9/16/2023 1804    Acceptance, E, VU by AV at 9/13/2023 1059    Acceptance, E,TB, VU by  at 9/12/2023 1308                         Point: Precautions (Done)       Learning Progress Summary             Patient Acceptance, E, VU,NR by RASHARD at 9/24/2023 0604    Acceptance, JONN MCDONNELL DU  by RF at 9/18/2023 1537    Acceptance, E, VU,DU by RF at 9/17/2023 1515    Acceptance, E, VU,DU by RF at 9/16/2023 1804    Acceptance, E, VU by AV at 9/13/2023 1059    Acceptance, E,TB, VU by  at 9/12/2023 1308                                         User Key       Initials Effective Dates Name Provider Type Discipline     06/16/21 -  Selena Lindquist, RN Registered Nurse Nurse    AV 06/16/21 -  Uvaldo Christine OT Occupational Therapist OT     01/19/22 -  Karl Baker, RN Registered Nurse Nurse     08/16/23 -  Gamal Simmons, MERLIN Student PT Student PT                  PT Recommendation and Plan  Anticipated Discharge Disposition (PT): (P) inpatient rehabilitation facility  Planned Therapy Interventions (PT): (P) balance training, bed mobility training, gait training, stair training, strengthening, transfer training  Therapy Frequency (PT): (P) daily  Progress Summary (PT)  Progress Toward Functional Goals (PT): (P) progress toward functional goals is good  Daily Progress Summary (PT): (P) Pt presents in a deconditioned state secondary to recent hospital stay. He has LE pain that makes it difficult for him to move in bed. He can only tolerate small amounts of exercise before he requires a break. He continues to require skilled PT services to address these deficits so that he can safely perform ADL's.  Plan of Care Reviewed With: (P) patient   Outcome Measures       Row Name 10/19/23 1500 10/18/23 1100          How much help from another person do you currently need...    Turning from your back to your side while in flat bed without using bedrails? 3 (P)   -ZT 3  -MOE (r) RH (t) MOE (c)     Moving from lying on back to sitting on the side of a flat bed without bedrails? 3 (P)   -ZT 3  -MOE (r) RH (t) MOE (c)     Moving to and from a bed to a chair (including a wheelchair)? 2 (P)   -ZT 2  -MOE (r) RH (t) MOE (c)     Standing up from a chair using your arms (e.g., wheelchair, bedside chair)? 2 (P)   -ZT 3  -MOE  (r) RH (t) MOE (c)     Climbing 3-5 steps with a railing? 1 (P)   -ZT 1  -MOE (r) RH (t) MOE (c)     To walk in hospital room? 1 (P)   -ZT 1  -MOE (r) RH (t) MOE (c)     AM-PAC 6 Clicks Score (PT) 12 (P)   -ZT 13  -MOE (r)  (t)     Highest level of mobility 4 --> Transferred to chair/commode (P)   -ZT 4 --> Transferred to chair/commode  -MOE (r) RH (t)               User Key  (r) = Recorded By, (t) = Taken By, (c) = Cosigned By      Initials Name Provider Type    MOEMerlin Muñiz, PT Physical Therapist     Gamal Simmons, PT Student PT Student    ZT Oscar Shields, PT Student PT Student                     Time Calculation:    PT Charges       Row Name 10/19/23 1505             Time Calculation    PT Received On 10/19/23 (P)   -ZT         Timed Charges    63201 - PT Therapeutic Exercise Minutes 15 (P)   -ZT         Total Minutes    Timed Charges Total Minutes 15 (P)   -ZT       Total Minutes 15 (P)   -ZT                User Key  (r) = Recorded By, (t) = Taken By, (c) = Cosigned By      Initials Name Provider Type    ZT Oscar Shields, PT Student PT Student                      PT G-Codes  Outcome Measure Options: AM-PAC 6 Clicks Daily Activity (OT), Optimal Instrument  AM-PAC 6 Clicks Score (PT): (P) 12  AM-PAC 6 Clicks Score (OT): 15    Oscar Shields PT Student  10/19/2023

## 2023-10-19 NOTE — PLAN OF CARE
Goal Outcome Evaluation:  Plan of Care Reviewed With: patient        Progress: no change  Outcome Evaluation: patient alert and oriented, medicated with prn medication. blood glucose monitored. skin and wound care per orders. attempted to dc patient this shift but patient filed appeal, SW on board. no other changes at this time.

## 2023-10-19 NOTE — DISCHARGE SUMMARY
[facility]         HOSPITALIST  DISCHARGE SUMMARY    Patient Name: Jose Shaikh  : 1960  MRN: [id number]    Date of Admission: 9/10/2023  Date of Discharge: 10/19/2023  Primary Care Physician: Delia Cervantes APRN  Reason for admission:  Weakness    Final diagnosis:  Generalized weakness  Deconditioning  Type 2 diabetes mellitus  Essential hypertension  Chronic paroxysmal atrial fibrillation on Eliquis  Obesity (BMI: 42.88)  Debility with wheelchair dependence  Severe degenerative joint disease of lower extremities  Chronic wound  Thrombocytopenia    Consults       Date and Time Order Name Status Description    10/2/2023  9:54 AM Inpatient Orthopedic Surgery Consult      2023  8:08 AM Inpatient Orthopedic Surgery Consult Completed     9/10/2023 10:14 PM Inpatient Hospitalist Consult              Active and Resolved Hospital Problems:  Active Hospital Problems    Diagnosis POA    **Generalized weakness [R53.1] Yes    Type 2 diabetes mellitus, with long-term current use of insulin [E11.9, Z79.4] Not Applicable    Class 3 severe obesity due to excess calories with serious comorbidity and body mass index (BMI) of 45.0 to 49.9 in adult [E66.01, Z68.42] Not Applicable    Dependence on wheelchair [Z99.3] Not Applicable    Foot pain, bilateral [M79.671, M79.672] Yes    Paroxysmal atrial fibrillation [I48.0] Yes    Essential hypertension [I10] Yes    Diabetic ulcer of left heel associated with type 2 DM [E11.621, L97.422] Yes      Resolved Hospital Problems   No resolved problems to display.       Hospital Course     Hospital Course:  Jose Shaikh is a 62 y.o. male past medical history significant for paroxysmal atrial fibrillation on chronic anticoagulation with Eliquis, morbid obesity, hypertension, hyperlipidemia, type 2 diabetes on insulin, history of chronic osteomyelitis and diabetic foot ulcers, discharged from [facility] in early 2023 evidently patient  developed some weakness called EMS brought to the emergency department noted to have hyponatremia remained weak admitted to the hospitalist service.  Sodium correct and stable physical therapy and Occupational Therapy and  consulted to aid in rehab placement.  Patient had prolonged hospitalization in an effort to place the patient in a skilled nursing facility unfortunately due to past social/criminal issues, he is proved difficult to place.  On 10/19/2023 the patient was deemed hemodynamically and medically stable for discharge will be discharged home in stable condition with home health and orders to follow-up with his PCP    DISCHARGE Follow Up Recommendations for labs and diagnostics: As above      Day of Discharge     Vital Signs:  Temp:  [97.3 °F (36.3 °C)-98.1 °F (36.7 °C)] 97.9 °F (36.6 °C)  Heart Rate:  [91-97] 92  Resp:  [16] 16  BP: (116-137)/(64-74) 116/71  Physical Exam:   Constitutional: Awake alert oriented no acute distress  Respiratory: Clear breath sounds noted bilaterally  Cardiovascular: Regular rate  GI: Abdomen is obese soft and tender      Discharge Details        Discharge Medications        New Medications        Instructions Start Date   empagliflozin 10 MG tablet tablet  Commonly known as: JARDIANCE   10 mg, Oral, Daily   Start Date: October 20, 2023     famotidine 40 MG tablet  Commonly known as: PEPCID   40 mg, Oral, Daily   Start Date: October 20, 2023     lisinopril 2.5 MG tablet  Commonly known as: PRINIVIL,ZESTRIL   2.5 mg, Oral, Every 24 Hours Scheduled   Start Date: October 20, 2023            Changes to Medications        Instructions Start Date   Levemir FlexPen 100 UNIT/ML injection  Generic drug: insulin detemir  What changed: how much to take   40 Units, Subcutaneous, 2 Times Daily             Continue These Medications        Instructions Start Date   acetaminophen 650 MG 8 hr tablet  Commonly known as: TYLENOL   650 mg, Oral, Every 8 Hours PRN      apixaban 5  MG tablet tablet  Commonly known as: ELIQUIS   5 mg, Oral, Every 12 Hours Scheduled      digoxin 125 MCG tablet  Commonly known as: LANOXIN   125 mcg, Oral, Daily Digoxin      HumaLOG KwikPen 100 UNIT/ML solution pen-injector  Generic drug: Insulin Lispro (1 Unit Dial)   INJECT 30 UNITS UNDER THE SKIN INTO THE APPROPRIATE AREA AS DIRECTED 3 (THREE) TIMES A DAY BEFORE MEALS.      HYDROcodone-acetaminophen 7.5-325 MG per tablet  Commonly known as: NORCO   1 tablet, Oral, Every 4 Hours PRN      naloxone 4 MG/0.1ML nasal spray  Commonly known as: NARCAN   Call 911. Don't prime. [place] in 1 nostril for overdose. Repeat in 2-3 minutes in other nostril if no or minimal breathing/responsiveness.      pregabalin 25 MG capsule  Commonly known as: LYRICA   25 mg, Oral, 3 Times Daily      simvastatin 20 MG tablet  Commonly known as: ZOCOR   20 mg, Oral, Nightly               Allergies   Allergen Reactions    Adhesive Tape Rash       Discharge Disposition:      Diet:  Hospital:  Diet Order   Procedures    Diet: Diabetic Diets, High Protein Diet; Consistent Carbohydrate; Texture: Regular Texture (IDDSI 7); Fluid Consistency: Thin (IDDSI 0)       Discharge Activity:   As tolerated    CODE STATUS:  Code Status and Medical Interventions:   Ordered at: 09/21/23 1000     Code Status (Patient has no pulse and is not breathing):    CPR (Attempt to Resuscitate)     Medical Interventions (Patient has pulse or is breathing):    Full Support         Future Appointments   Date Time Provider Department Center   10/24/2023 10:30 AM ROOM 1 NURSE [facility] PRERNA ELLIOTT           Pertinent  and/or Most Recent Results     PROCEDURES:   None    LAB RESULTS:      Lab 10/18/23  0456 10/15/23  0500   WBC 4.43 6.30   HEMOGLOBIN 9.7* 10.6*   HEMATOCRIT 30.8* 34.8*   PLATELETS 106* 110*   NEUTROS ABS 2.56 3.91   IMMATURE GRANS (ABS) 0.01 0.02   LYMPHS ABS 1.27 1.63   MONOS ABS 0.49 0.60   EOS ABS 0.08 0.09   MCV 81.7 83.1         Lab 10/18/23  0454  10/15/23  0500   SODIUM 135* 136   POTASSIUM 4.1 3.9   CHLORIDE 106 105   CO2 21.9* 22.9   ANION GAP 7.1 8.1   BUN 15 18   CREATININE 0.62* 0.48*   EGFR 106.7 115.3   GLUCOSE 212* 143*   CALCIUM 8.3* 8.6   MAGNESIUM 1.7 1.8   PHOSPHORUS 3.9  --          Lab 10/18/23  0456   TOTAL PROTEIN 5.4*   ALBUMIN 2.8*   ALT (SGPT) 39   AST (SGOT) 32   BILIRUBIN 0.4   INDIRECT BILIRUBIN 0.2   BILIRUBIN DIRECT 0.2   ALK PHOS 223*                     Brief Urine Lab Results  (Last result in the past 365 days)        Color   Clarity   Blood   Leuk Est   Nitrite   Protein   CREAT   Urine HCG        09/11/23 2200 Dark Yellow   Clear   Negative   Negative   Negative   Negative                 Microbiology Results (last 10 days)       Procedure Component Value - Date/Time    Enteric Bacterial Panel - Stool, Per Rectum [[id number]]  (Normal) Collected: 10/17/23 2026    Lab Status: Final result Specimen: Stool from Per Rectum Updated: 10/18/23 0856     Salmonella Not Detected     Campylobacter Not Detected     Shigella/Enteroinvasive E. coli (EIEC) Not Detected     Shiga-like toxin-producing E. coli (STEC) stx1/stx2 Not Detected    Clostridioides difficile Toxin - Stool, Per Rectum [[id number]] Collected: 10/17/23 0144    Lab Status: Final result Specimen: Stool from Per Rectum Updated: 10/17/23 0248    Narrative:      The following orders were created for panel order Clostridioides difficile Toxin - Stool, Per Rectum.  Procedure                               Abnormality         Status                     ---------                               -----------         ------                     Clostridioides difficile...[[id number]]                      Final result                 Please view results for these tests on the individual orders.    Clostridioides difficile Toxin, PCR - Stool, Per Rectum [[id number]] Collected: 10/17/23 0144    Lab Status: Final result Specimen: Stool from Per Rectum Updated: 10/17/23 0248     Toxigenic C.  difficile by PCR Negative     027 Toxin Presumptive Negative    Narrative:      The result indicates the absence of toxigenic C. difficile from stool specimen.             FL Guide For Pain Meds Inj    Result Date: 9/22/2023  Impression:  Successful steroid joint injection of the left hip was performed without complication, patient tolerated procedure.  The patient was instructed to obtain follow up care from the referring physician.     Exam directly supervised by Dr. Cole WEINER       Electronically Signed and Approved By: Slava Galvan M.D. on 9/22/2023 at 16:42              Results for orders placed during the hospital encounter of 03/27/23    Duplex Venous Lower Extremity - Left CAR    Interpretation Summary    Technically limited study.  No evidence of deep venous thrombosis in the left common femoral, popliteal, or posterior tibial veins.  Other left lower extremity veins were suboptimally imaged      Results for orders placed during the hospital encounter of 03/27/23    Duplex Venous Lower Extremity - Left CAR    Interpretation Summary    Technically limited study.  No evidence of deep venous thrombosis in the left common femoral, popliteal, or posterior tibial veins.  Other left lower extremity veins were suboptimally imaged          Labs Pending at Discharge:        Time spent on Discharge including face to face service: 35 minutes    Electronically signed by HUSAM Jacobsen, 10/19/23, 4:06 PM EDT.    Attending documentation:  I reviewed the above documentation and independently reviewed and rounded and evaluated the patient and discussed the care plan with TALITA Lawrence PA-C, I agree with his findings and plan as documented, what I have added to the care plan and modified is as follows in my documentation and my medical decision making and disposition plan; 62-year-old male initially hospitalized on 9/10/2023, prolonged hospitalization for treatment of management of generalized weakness,  deconditioning, with acute issues of diarrhea, C. difficile negative.  Diarrhea improved.  Had small hematoma after ambulating on his foot.  Unfortunately with exhaustive efforts to try to place this gentleman after 39 days of hospitalization, we are unable to find a facility that will accept him.  He has no further acute needs or requirements for inpatient monitoring and management, his labs and vitals are stable, he is tolerating oral intake, and has been refusing turns and repositionings and other interventions with nursing staff despite recommendations.  Patient discharged in hemodynamically stable addition on 10/19/2023 to follow-up with PCP within 1 week.  Unfortunately we cannot solve all of his social issues during this hospitalization due to lack of resources and participation from the patient's perspective despite all our efforts. Interval follow-up: Seen and examined, less diarrhea, no acute distress, no acute major night events, Nursing staff report he has been still intermittently refusing interventions.  No chest pain or palpitations. Review of systems obtained, all systems reviewed negative except generalized fatigue, generalized weakness, right foot hematoma.  Vitals reviewed, labs reviewed on physical exam well-appearing male, no acute distress, morbidly obese, regular rate and rhythm, clear auscultation bilaterally, soft nontender abdomen, no lower extreme edema, alert and oriented x3, disfigured right foot with amputations.  Assessment as above, plan, discharge home with home health if we can set him up with home health.  Recommend he start taking initiative for his own health care and start to participate and pursue follow-up as requested and recommended, total discharge time 45 minutes.  90% of the time of this patient's encounter was performed by me, this included face-to-face time, planning and coordinating, medical decision making and critical thinking personally done by  me.  -AVBMD      Electronically signed by Cailin Acosta MD, 10/19/23, 4:38 PM EDT.  Portions of this documentation were transcribed electronically from a voice recognition software.  I confirm all data accurately represents the service(s) I performed at today's visit.

## 2023-10-20 LAB
GLUCOSE BLDC GLUCOMTR-MCNC: 113 MG/DL (ref 70–99)
GLUCOSE BLDC GLUCOMTR-MCNC: 160 MG/DL (ref 70–99)
GLUCOSE BLDC GLUCOMTR-MCNC: 163 MG/DL (ref 70–99)
GLUCOSE BLDC GLUCOMTR-MCNC: 165 MG/DL (ref 70–99)

## 2023-10-20 PROCEDURE — 63710000001 INSULIN DETEMIR PER 5 UNITS: Performed by: INTERNAL MEDICINE

## 2023-10-20 PROCEDURE — 63710000001 INSULIN LISPRO (HUMAN) PER 5 UNITS: Performed by: INTERNAL MEDICINE

## 2023-10-20 PROCEDURE — 82948 REAGENT STRIP/BLOOD GLUCOSE: CPT

## 2023-10-20 PROCEDURE — 63710000001 ONDANSETRON ODT 4 MG TABLET DISPERSIBLE: Performed by: FAMILY MEDICINE

## 2023-10-20 PROCEDURE — 99231 SBSQ HOSP IP/OBS SF/LOW 25: CPT | Performed by: FAMILY MEDICINE

## 2023-10-20 RX ORDER — ONDANSETRON 4 MG/1
4 TABLET, ORALLY DISINTEGRATING ORAL EVERY 6 HOURS PRN
Status: DISCONTINUED | OUTPATIENT
Start: 2023-10-20 | End: 2023-11-06 | Stop reason: HOSPADM

## 2023-10-20 RX ADMIN — HYDROCODONE BITARTRATE AND ACETAMINOPHEN 1 TABLET: 7.5; 325 TABLET ORAL at 21:44

## 2023-10-20 RX ADMIN — PREGABALIN 75 MG: 75 CAPSULE ORAL at 08:45

## 2023-10-20 RX ADMIN — INSULIN LISPRO 4 UNITS: 100 INJECTION, SOLUTION INTRAVENOUS; SUBCUTANEOUS at 17:29

## 2023-10-20 RX ADMIN — BACITRACIN 0.9 G: 500 OINTMENT TOPICAL at 12:15

## 2023-10-20 RX ADMIN — INSULIN LISPRO 5 UNITS: 100 INJECTION, SOLUTION INTRAVENOUS; SUBCUTANEOUS at 17:29

## 2023-10-20 RX ADMIN — CYANOCOBALAMIN TAB 500 MCG 1000 MCG: 500 TAB at 08:45

## 2023-10-20 RX ADMIN — PREGABALIN 75 MG: 75 CAPSULE ORAL at 17:30

## 2023-10-20 RX ADMIN — INSULIN DETEMIR 45 UNITS: 100 INJECTION, SOLUTION SUBCUTANEOUS at 08:45

## 2023-10-20 RX ADMIN — INSULIN LISPRO 4 UNITS: 100 INJECTION, SOLUTION INTRAVENOUS; SUBCUTANEOUS at 12:57

## 2023-10-20 RX ADMIN — Medication 250 MG: at 21:48

## 2023-10-20 RX ADMIN — Medication: at 12:14

## 2023-10-20 RX ADMIN — INSULIN LISPRO 4 UNITS: 100 INJECTION, SOLUTION INTRAVENOUS; SUBCUTANEOUS at 21:43

## 2023-10-20 RX ADMIN — HYDROCODONE BITARTRATE AND ACETAMINOPHEN 1 TABLET: 7.5; 325 TABLET ORAL at 12:14

## 2023-10-20 RX ADMIN — MICONAZOLE NITRATE 1 APPLICATION: 2 POWDER TOPICAL at 21:45

## 2023-10-20 RX ADMIN — HYDROCODONE BITARTRATE AND ACETAMINOPHEN 1 TABLET: 7.5; 325 TABLET ORAL at 04:26

## 2023-10-20 RX ADMIN — MICONAZOLE NITRATE 1 APPLICATION: 2 POWDER TOPICAL at 12:14

## 2023-10-20 RX ADMIN — PREGABALIN 75 MG: 75 CAPSULE ORAL at 21:44

## 2023-10-20 RX ADMIN — FAMOTIDINE 40 MG: 20 TABLET, FILM COATED ORAL at 09:56

## 2023-10-20 RX ADMIN — CHOLESTYRAMINE 1 PACKET: 4 POWDER, FOR SUSPENSION ORAL at 08:45

## 2023-10-20 RX ADMIN — ATORVASTATIN CALCIUM 10 MG: 10 TABLET, FILM COATED ORAL at 21:48

## 2023-10-20 RX ADMIN — INSULIN LISPRO 5 UNITS: 100 INJECTION, SOLUTION INTRAVENOUS; SUBCUTANEOUS at 12:57

## 2023-10-20 RX ADMIN — BACLOFEN 10 MG: 10 TABLET ORAL at 22:50

## 2023-10-20 RX ADMIN — APIXABAN 5 MG: 5 TABLET, FILM COATED ORAL at 08:45

## 2023-10-20 RX ADMIN — Medication 250 MG: at 08:45

## 2023-10-20 RX ADMIN — APIXABAN 5 MG: 5 TABLET, FILM COATED ORAL at 21:44

## 2023-10-20 RX ADMIN — BACLOFEN 10 MG: 10 TABLET ORAL at 05:04

## 2023-10-20 RX ADMIN — ONDANSETRON 4 MG: 4 TABLET, ORALLY DISINTEGRATING ORAL at 13:28

## 2023-10-20 RX ADMIN — EMPAGLIFLOZIN 10 MG: 10 TABLET, FILM COATED ORAL at 08:45

## 2023-10-20 RX ADMIN — LISINOPRIL 2.5 MG: 2.5 TABLET ORAL at 08:45

## 2023-10-20 RX ADMIN — BACITRACIN 0.9 G: 500 OINTMENT TOPICAL at 21:51

## 2023-10-20 RX ADMIN — CHOLESTYRAMINE 1 PACKET: 4 POWDER, FOR SUSPENSION ORAL at 21:49

## 2023-10-20 RX ADMIN — INSULIN LISPRO 5 UNITS: 100 INJECTION, SOLUTION INTRAVENOUS; SUBCUTANEOUS at 08:45

## 2023-10-20 NOTE — DISCHARGE PLACEMENT REQUEST
"Sami Shaikh (62 y.o. Male)       Date of Birth   1960    Social Security Number       Address   [address] [facility] [address] [place] [id number]    Home Phone   [phone]    MRN   [id number]       [place]   None    Marital Status                               Admission Date   9/10/23    Admission Type   Emergency    Admitting Provider   Amauri Ingram MD    Attending Provider   Cailin Acosta MD    Department, Room/Bed   [address], 4027/1       Discharge Date       Discharge Disposition       Discharge Destination                                 Attending Provider: Cailin Acosta MD    Allergies: Adhesive Tape    Isolation: None   Infection: None   Code Status: CPR    Ht: 188 cm (74\")   Wt: 151 kg (334 lb)    Admission Cmt: None   Principal Problem: Generalized weakness [R53.1]                   Active Insurance as of 9/10/2023       Primary Coverage       Payor Plan Insurance Group Employer/Plan Group    [facility] MEDICARE REPLACEMENT [facility] DUAL COMPLETE MEDICARE REPLACEMENT [id number]       Payor Plan Address Payor Plan Phone Number Payor Plan Fax Number Effective Dates    [address] [phone]  5/1/2022 - None Entered    [facility] [id number]         Subscriber Name Subscriber Birth Date Member ID       SAMI SHAIKH 1960 [id number]               Secondary Coverage       Payor Plan Insurance Group Employer/Plan Group    [place]        Payor Plan Address Payor Plan Phone Number Payor Plan Fax Number Effective Dates    [address] [phone]  6/22/2021 - None Entered    [facility] [address]         Subscriber Name Subscriber Birth Date Member ID       SAMI SHAIKH 1960 [id number]                     Emergency Contacts        (Rel.) Home Phone Work Phone Mobile Phone    RAMONITA SHAIKH JR (Son) -- -- [phone]    MICHELLE SHAIKH (Daughter) -- -- [phone]    Michelle Chandra " (Friend) -- -- [phone]              Emergency Contact Information       Name Relation Home Work Mobile    RAMONITA SHAIKH JR Son   [phone]    MICHELLE SHAIKH Daughter   [phone]    Michelle Chandra Friend   [phone]          Insurance Information                  [facility] MEDICARE REPLACEMENT/[facility] DUAL COMPLETE MEDICARE REPLACEMENT Phone: [phone]    Subscriber: Jose Shaikh Subscriber#: [id number]    Group#: [id number] Precert#: --        [place] Phone: [phone]    Subscriber: Jose Shaikh Subscriber#: [id number]    Group#: -- Precert#: --             History & Physical        Levi Finney MD at 23 0237           [facility]   HISTORY AND PHYSICAL    Patient Name: Jose Shaikh  : 1960  MRN: [id number]  Primary Care Physician:  Delia Cervantes APRN  Date of admission: 9/10/2023    Subjective   Subjective     Chief Complaint: Generalized weakness    HPI:    Jose Shaikh is a 62 y.o. male with past medical history of diabetes with polyneuropathy and foot amputation, hypertension, chronic wounds, morbid obesity, anxiety/depression, and GERD presented via EMS with generalized weakness.  Patient was discharged from [facility] re[facility] yesterday and stated that he could barely move or get out of his car so EMS was called to bring him to the ED.  Patient states that due to nonweightbearing restrictions that was implemented by wound care during his rehab, physical therapy did not work on strengthening his left lower extremity.  Due to the restriction patient feels as though his rehab was not adequate resulted in him being unable to care for himself upon discharge.  In the ED patient's vitals were all within normal limits on arrival.  Labs showed that he did have significant hyponatremia with remaining labs being relatively unremarkable given his chronic conditions.  When asked he denied any recent fevers, chills, headaches, chest  pain, palpitation, shortness of breath, cough, abdominal pain, nausea, vomiting, diarrhea, constipation, dysuria, hematuria, hematochezia, melena, or anxiety.  Patient was admitted for further evaluation and treatment.    Review of Systems   All systems were reviewed and negative except for: As specified in HPI    Personal History     Past Medical History:   Diagnosis Date    Absence of toe of right foot     Acute osteomyelitis of left calcaneus  8/18/2021    Anxiety and depression     Arthritis     Claustrophobia     Corns and callus     Diabetic ulcer of left heel associated with type 2 DM 8/18/2021    Diabetic ulcer of left heel associated with type 2 DM 7/6/2021    Diabetic ulcer of right midfoot associated with type 2 DM 8/18/2021    Difficulty walking     Essential hypertension 8/31/2021    Hammertoe     Hyperlipidemia LDL goal <100 8/31/2021    Ingrown toenail     Obesity     Paroxysmal atrial fibrillation 8/31/2021    Polyneuropathy     Pressure ulcer, stage 1     Tinea unguium     Type 2 diabetes mellitus with polyneuropathy        Past Surgical History:   Procedure Laterality Date    CYST REMOVAL      center of back; benign    INCISION AND DRAINAGE ABSCESS      back    INCISION AND DRAINAGE LEG Right 12/10/2021    Procedure: INCISION AND DRAINAGE LOWER EXTREMITY;  Surgeon: Ash Leyva DPM;  Location: [facility];  Service: Podiatry;  Laterality: Right;    OTHER SURGICAL HISTORY      Surgical clips left foot    TOE SURGERY Right     Removal of 5th toe    TRANS METATARSAL AMPUTATION Right 12/2/2021    Procedure: AMPUTATION TRANS METATARSAL;  Surgeon: Ash Leyva DPM;  Location: [facility];  Service: Podiatry;  Laterality: Right;    WRIST SURGERY Left     repair of injury       Family History: family history includes Cancer in his father; Heart disease in his brother, father, and mother. Otherwise pertinent FHx was reviewed and not pertinent to current issue.    Social History:   reports that he quit smoking about 32 years ago. His smoking use included cigarettes. He has never used smokeless tobacco. He reports current alcohol use. He reports current drug use. Drug: Marijuana.    Home Medications:  Dulaglutide, HYDROcodone-acetaminophen, Insulin Lispro (1 Unit Dial), acetaminophen, apixaban, digoxin, insulin detemir, metFORMIN, metoprolol succinate XL, naloxone, pregabalin, and simvastatin      Allergies:  Allergies   Allergen Reactions    Adhesive Tape Rash       Objective   Objective     Vitals:   Temp:  [97.6 °F (36.4 °C)-98 °F (36.7 °C)] 98 °F (36.7 °C)  Heart Rate:  [] 83  Resp:  [18] 18  BP: ()/() 97/44  Physical Exam    Constitutional: Awake, alert   Eyes: PERRLA, sclerae anicteric, no conjunctival injection   HENT: NCAT, mucous membranes moist   Neck: Supple, no thyromegaly, no lymphadenopathy, trachea midline   Respiratory: Clear to auscultation bilaterally, nonlabored respirations    Cardiovascular: RRR, no murmurs, rubs, or gallops, palpable pedal pulses bilaterally   Gastrointestinal: Positive bowel sounds, soft, nontender, nondistended   Musculoskeletal: Transmetatarsal amputation, no clubbing or cyanosis to extremities   Psychiatric: Appropriate affect, cooperative   Neurologic: Oriented x 3, strength symmetric in all extremities, Cranial Nerves grossly intact to confrontation, speech clear   Skin: Healing lower extremity wounds    Result Review    Result Review:  I have personally reviewed the results from the time of this admission to 9/11/2023 02:37 EDT and agree with these findings:  [x]  Laboratory list / accordion  []  Microbiology  []  Radiology  [x]  EKG/Telemetry   []  Cardiology/Vascular   []  Pathology  []  Old records  []  Other:  Most notable findings include: Hyponatremia      Assessment & Plan   Assessment / Plan     Brief Patient Summary:  Jose Shaikh is a 62 y.o. male who ith past medical history of diabetes with polyneuropathy and foot  amputation, hypertension, chronic wounds, morbid obesity, anxiety/depression, and GERD presented via EMS with generalized weakness    Active Hospital Problems:  Active Hospital Problems    Diagnosis     **Generalized weakness     Type 2 diabetes mellitus, with long-term current use of insulin     Class 3 severe obesity due to excess calories with serious comorbidity and body mass index (BMI) of 45.0 to 49.9 in adult     Dependence on wheelchair     Foot pain, bilateral     Paroxysmal atrial fibrillation     Essential hypertension     Diabetic ulcer of left heel associated with type 2 DM      Plan:     Generalized weakness  -Admit to medical floor  -Discharged from inpatient rehab yesterday  -Patient unable to perform ADLs.   -Patient morbidly obese  -Fall precautions  -PT OT  -Wound care  -Case management consulted for likely placement  -Supportive care    Hyponatremia  -Patient euvolemic, asymptomatic  -Glucose mildly elevated  -Superimposed hypochloremia  -EKG reviewed  -Urinalysis pending  -Admits to diarrhea  -NS IVF for now  -Further work-up per clinical course  -Follow labs    DM2  -ISS  -Titrate as needed    HTN  -Currently well controlled  -PRN BP meds  -Resume home meds when available  -Titrate if needed    Hx A-fib  -Resume home AC when available    Hx CKD  Hx CAD  Hx Morbid Obesity  Hx Chronic Wound: Wound care consulted  Debility/Wheelchair dependence     GI ppx  DVT ppx      DVT prophylaxis:  No DVT prophylaxis order currently exists.    CODE STATUS:       Admission Status:  I believe this patient meets inpatient status.      Electronically signed by Levi Finney MD, 23, 2:37 AM EDT.    Electronically signed by Levi Finney MD at 23 [phone]          Physician Progress Notes (most recent note)        Cailin Acosta MD at 10/18/23 1359          [facility]  Date: 10/18/2023  Patient Name: Jose Shaikh  : 1960  MRN: [id number]  Date of admission:  9/10/2023  Consultants:   -Orthopedic Surgery: Dr. Riki Davis    Subjective   Subjective     Chief Complaint: Weakness    Summary:   Jose Shaikh is a 62 y.o. male with multiple medical problems [facility] and stated he could barely move or get out of his car so EMS was called.  Patient had hip and knee injections in hopes that this would help with his pain and allow him to work better with physical therapy and did some a little bit but he is unable to walk at this time and  is continue to try arrange rehab placement.  During hospitalization patient was evaluated by orthopedic surgery at patient's request for left total knee arthroplasty.  Per orthopedic surgery patient is not a candidate for left total knee arthroplasty due to morbid obesity, diabetes and chronic morbidities.  Patient has been here for a month waiting for placement which has been difficult due to a variety of barriers.    Interval Followup: Patient seen and examined resting comfortably continues to have diarrhea but improving.  Currently working on rehab placement    Review of systems: All systems reviewed and negative except following: Patient complains of generalized weakness fatigue and diarrhea  Objective   Objective     Vitals:   Temp:  [97.3 °F (36.3 °C)-97.9 °F (36.6 °C)] 97.6 °F (36.4 °C)  Heart Rate:  [83-88] 86  Resp:  [16] 16  BP: (110-128)/(56-73) 122/61  Physical Exam   Constitutional: Awake alert oriented no acute distress  Respiratory clear  Cardiovascular: RRR  GI: Abdomen soft nontender bowel sounds present  Result Review    Result Review:  I have personally reviewed the results as below and agree with these findings:  [x]  Laboratory:   CMP          10/11/2023    06:05 10/15/2023    05:00 10/18/2023    04:56   CMP   Glucose 129  143  212    BUN 17  18  15    Creatinine 0.52  0.48  0.62    EGFR 112.6  115.3  106.7    Sodium 133  136  135    Potassium 4.3   3.9  4.1    Chloride 102  105  106    Calcium 8.9  8.6  8.3    Total Protein   5.4    Albumin   2.8    Total Bilirubin   0.4    Alkaline Phosphatase   223    AST (SGOT)   32    ALT (SGPT)   39    BUN/Creatinine Ratio 32.7  37.5  24.2    Anion Gap 5.9  8.1  7.1      CBC          10/11/2023    06:05 10/15/2023    05:00 10/18/2023    04:56   CBC   WBC 5.58  6.30  4.43    RBC 4.54  4.19  3.77    Hemoglobin 11.6  10.6  9.7    Hematocrit 37.1  34.8  30.8    MCV 81.7  83.1  81.7    MCH 25.6  25.3  25.7    MCHC 31.3  30.5  31.5    RDW 17.4  18.1  17.0    Platelets 105  110  106    Blood glucose well controlled.  []  Microbiology:   []  Radiology:   []  EKG/Telemetry:    []  Cardiology/Vascular:    []  Pathology:  []  Old records:  []  Other:    Assessment & Plan   Assessment / Plan     Assessment/plan:  Generalized weakness  Deconditioning  Type 2 diabetes mellitus  Essential hypertension  Chronic paroxysmal atrial fibrillation on Eliquis  Obesity (BMI: 42.88)  Debility with wheelchair dependence  Severe degenerative joint disease of lower extremities  Chronic wound  Thrombocytopenia    Plan:    Patient with complaints of watery diarrhea C. difficile negative checking enteric bacterial panel negative  Continue Florastor  Continue as needed Imodium and scheduled Questran  If continues to have diarrhea tomorrow we will consider abdominal imaging  Recheck a.m. labs  Continue other medications as ordered  PT OT consultations  PT/OT/ consulted and following.  Placement has been difficult due to a variety of barriers.  Spoke with social work, they have already sent referrals to facilities outside of [place] and have still not heard back from any of them yet.  Will consider discharge home with home health if all efforts have been exhausted and patient is unable to be placed later this week   Discussed with RN    DVT prophylaxis:  Medical DVT prophylaxis orders are present.    CODE STATUS:   Code Status (Patient has  no pulse and is not breathing): CPR (Attempt to Resuscitate)  Medical Interventions (Patient has pulse or is breathing): Full Support  Electronically signed by HUSAM Jacobsen, 10/18/23, 2:01 PM EDT.      Attending documentation:  I reviewed the above documentation and independently reviewed and rounded and evaluated the patient and discussed the care plan with TALITA Lawrence PA-C, I agree with his findings and plan as documented, what I have added to the care plan and modified is as follows in my documentation and my medical decision making; 62-year-old male initially hospitalized on 9/10/2023, prolonged hospitalization for treatment of management of generalized weakness, deconditioning, with acute issues of diarrhea, C. difficile negative.  Diarrhea improved.  Had small hematoma after ambulating on his foot.  Interval follow-up: Seen and examined, complains of diarrhea, no acute distress, no acute major night events, watery bowel movements have subsided some.  Nursing staff report he has been intermittently refusing Questran.  No chest pain or palpitations.  Right foot has hematoma after ambulating.  Wound care following.  Review of systems obtained, all systems reviewed negative except generalized fatigue, generalized weakness, right foot hematoma.  Vitals reviewed, labs reviewed on physical exam well-appearing male, no acute distress, morbidly obese, regular rate and rhythm, clear auscultation bilaterally, soft nontender abdomen, no lower extreme edema, alert and oriented x3, disfigured right foot with amputations.  Assessment as above, plan, continue encourage use of Questran, continue mobilization per pathway, utilize Lomotil, still awaiting referrals for rehab, a.m. labs, full code, DVT prophylaxis with Eliquis, clinical course dictate further management, discussed with nurse at the bedside.  More than 65 % of the time of this patient's encounter was performed by me, this included face-to-face time,  planning and coordinating, medical decision making and critical thinking personally done by me.  -AVBMD    Electronically signed by Cailin Acosta MD, 10/18/23, 4:40 PM EDT.  Portions of this documentation were transcribed electronically from a voice recognition software.  I confirm all data accurately represents the service(s) I performed at today's visit.            Electronically signed by Cailin Acosta MD at 10/18/23 1640          Physical Therapy Notes (last 48 hours)        Gamal Simmons, PT Student at 10/18/23 1109  Version 1 of 1      Attestation signed by Merlin Rao, PT at 10/18/23 1120                     Acute Care - Physical Therapy Treatment Note  KEO Dominguez     Patient Name: Jose Shaikh  : 1960  MRN: [id number]  Today's Date: 10/18/2023      Visit Dx:     ICD-10-CM ICD-9-CM   1. Generalized weakness  R53.1 780.79   2. Hyponatremia  E87.1 276.1   3. Difficulty in walking  R26.2 719.7   4. Decreased activities of daily living (ADL)  Z78.9 V49.89   5. Difficulty walking  R26.2 719.7     Patient Active Problem List   Diagnosis    Diabetic ulcer of left heel associated with type 2 DM    Acute osteomyelitis of left calcaneus     Diabetic ulcer of left heel associated with type 2 DM    Diabetic ulcer of right midfoot associated with type 2 DM    Paroxysmal atrial fibrillation    Essential hypertension    Hyperlipidemia LDL goal <100    Cellulitis and abscess of foot    High alkaline phosphatase level    Osteomyelitis    Onychomycosis    Onychocryptosis    Foot pain, bilateral    Osteomyelitis of foot, right, acute    Cellulitis of right foot    Type 2 diabetes mellitus, with long-term current use of insulin    Class 3 severe obesity due to excess calories with serious comorbidity and body mass index (BMI) of 45.0 to 49.9 in adult    Anxiety disorder, unspecified    Claustrophobia    Dependence on wheelchair    Depression, unspecified    Long term (current) use of  anticoagulants    Long term (current) use of oral hypoglycemic drugs    Wound of foot    Non-prs chronic ulcer oth prt r foot limited to brkdwn skin    Orthostatic hypotension    Other chronic osteomyelitis, right ankle and foot    Personal history of nicotine dependence    Thrombocytopenia, unspecified    Unspecified open wound, right foot, initial encounter    Diabetic foot infection    Subacute osteomyelitis of right foot    Right foot pain    Sepsis    Onychomycosis    Foot pain, left    Impaired mobility and ADLs    Absence of toe of right foot    Corns and callosity    Disability of walking    Fracture    Limb swelling    Polyneuropathy    Pressure ulcer, stage 1    Shortness of breath    Generalized weakness     Past Medical History:   Diagnosis Date    Absence of toe of right foot     Acute osteomyelitis of left calcaneus  8/18/2021    Anxiety and depression     Arthritis     Claustrophobia     Corns and callus     Diabetic ulcer of left heel associated with type 2 DM 8/18/2021    Diabetic ulcer of left heel associated with type 2 DM 7/6/2021    Diabetic ulcer of right midfoot associated with type 2 DM 8/18/2021    Difficulty walking     Essential hypertension 8/31/2021    Hammertoe     Hyperlipidemia LDL goal <100 8/31/2021    Ingrown toenail     Obesity     Paroxysmal atrial fibrillation 8/31/2021    Polyneuropathy     Pressure ulcer, stage 1     Tinea unguium     Type 2 diabetes mellitus with polyneuropathy      Past Surgical History:   Procedure Laterality Date    CYST REMOVAL      center of back; benign    INCISION AND DRAINAGE ABSCESS      back    INCISION AND DRAINAGE LEG Right 12/10/2021    Procedure: INCISION AND DRAINAGE LOWER EXTREMITY;  Surgeon: Ash Leyva DPM;  Location: [facility] [place];  Service: Podiatry;  Laterality: Right;    OTHER SURGICAL HISTORY      Surgical clips left foot    TOE SURGERY Right     Removal of 5th toe    TRANS METATARSAL AMPUTATION Right 12/2/2021     Procedure: AMPUTATION TRANS METATARSAL;  Surgeon: Ash Leyva DPM;  Location: [facility];  Service: Podiatry;  Laterality: Right;    WRIST SURGERY Left     repair of injury     PT Assessment (last 12 hours)       PT Evaluation and Treatment       Row Name 10/18/23 1100          Physical Therapy Time and Intention    Subjective Information complains of;weakness;pain (P)   -RH     Document Type therapy note (daily note) (P)   -RH     Mode of Treatment individual therapy;physical therapy (P)   -RH     Patient Effort good (P)   -RH     Symptoms Noted During/After Treatment increased pain (P)   -RH       Row Name 10/18/23 1100          General Information    Patient Profile Reviewed yes (P)   -RH     Patient Observations alert;cooperative;agree to therapy (P)   -       Row Name 10/18/23 1100          Bed Mobility    Bed Mobility supine-sit;sit-supine (P)   -RH     Supine-Sit [place] (Bed Mobility) minimum assist (75% patient effort) (P)   -RH     Sit-Supine [place] (Bed Mobility) minimum assist (75% patient effort) (P)   -RH     Bed Mobility, Safety Issues decreased use of legs for bridging/pushing;decreased use of arms for pushing/pulling (P)   -RH     Assistive Device (Bed Mobility) bed rails;draw sheet;head of bed elevated (P)   -       Row Name 10/18/23 1100          Transfers    Transfers sit-stand transfer;stand-sit transfer (P)   -       Row Name 10/18/23 1100          Sit-Stand Transfer    Sit-Stand [place] (Transfers) minimum assist (75% patient effort);verbal cues (P)   -RH     Assistive Device (Sit-Stand Transfers) walker, front-wheeled (P)   -       Row Name 10/18/23 1100          Stand-Sit Transfer    Stand-Sit [place] (Transfers) minimum assist (75% patient effort) (P)   -RH     Assistive Device (Stand-Sit Transfers) walker, front-wheeled (P)   -       Row Name 10/18/23 1100          Gait/Stairs (Locomotion)    Patient was able to Ambulate no, other medical  factors prevent ambulation (P)   -RH     Reason Patient was unable to Ambulate Excessive Weakness;Uncontrolled Pain (P)   -RH     Gait Assessment/Intervention Patient was not able to ambulate, but he did tolerate standing x2 minutes with lateral weight shifts throughout the time standing with CGA and a rolling walker for safety. (P)   -RH       Row Name 10/18/23 1100          Balance    Balance Assessment standing dynamic balance (P)   -RH     Dynamic Standing Balance contact guard (P)   -     Position/Device Used, Standing Balance supported;walker, front-wheeled (P)   -RH       Row Name             Wound 12/02/21 Right anterior foot Incision    Wound - Properties Group Placement Date: 12/02/21  -ES Side: Right  -ES Orientation: anterior  -ES Location: foot  -ES Primary Wound Type: Incision  -ES    Retired Wound - Properties Group Placement Date: 12/02/21  -ES Side: Right  -ES Orientation: anterior  -ES Location: foot  -ES Primary Wound Type: Incision  -ES    Retired Wound - Properties Group Date first assessed: 12/02/21  -ES Side: Right  -ES Location: foot  -ES Primary Wound Type: Incision  -ES      Row Name             Wound 10/17/23 0546 Bilateral perirectal MASD (Moisture associated skin damage)    Wound - Properties Group Placement Date: 10/17/23  -AS Placement Time: 0546  -AS Present on Original Admission: N  -AS Side: Bilateral  -AS Location: perirectal  -AS Primary Wound Type: MASD  -AS    Retired Wound - Properties Group Placement Date: 10/17/23  -AS Placement Time: 0546  -AS Present on Original Admission: N  -AS Side: Bilateral  -AS Location: perirectal  -AS Primary Wound Type: MASD  -AS    Retired Wound - Properties Group Date first assessed: 10/17/23  -AS Time first assessed: 0546  -AS Present on Original Admission: N  -AS Side: Bilateral  -AS Location: perirectal  -AS Primary Wound Type: MASD  -AS      Row Name 10/18/23 1100          Progress Summary (PT)    Progress Toward Functional Goals (PT)  progress toward functional goals is fair (P)   -     Daily Progress Summary (PT) Patient was able to tolerate more functional therapeutic activity today. He stood at edge of bed for 2 minutes and performed lateral weight shifts with CGA and a rolling walker. His bed mobility and transfer assistance levels have improved as he is now a Rich for all. (P)   -               User Key  (r) = Recorded By, (t) = Taken By, (c) = Cosigned By      Initials Name Provider Type    AS Angelina Alfredo, RN Registered Nurse     Schnee, Katlyn, RN Registered Nurse     Gamal Simmons, PT Student PT Student                    Physical Therapy Education       Title: PT OT SLP Therapies (Done)       Topic: Physical Therapy (Done)       Point: Mobility training (Done)       Learning Progress Summary             Patient Acceptance, E, VU,NR by  at 9/24/2023 0604    Acceptance, E, VU,DU by  at 9/18/2023 1537    Acceptance, E, VU,DU by  at 9/17/2023 1515    Acceptance, E, VU,DU by  at 9/16/2023 1804    Acceptance, E, VU by AV at 9/13/2023 1059    Acceptance, E,TB, VU by  at 9/12/2023 1308                         Point: Home exercise program (Done)       Learning Progress Summary             Patient Acceptance, E, VU,NR by  at 9/24/2023 0604    Acceptance, E, VU,DU by  at 9/18/2023 1537    Acceptance, E, VU,DU by  at 9/17/2023 1515    Acceptance, E, VU,DU by  at 9/16/2023 1804    Acceptance, E, VU by AV at 9/13/2023 1059    Acceptance, E,TB, VU by  at 9/12/2023 1308                         Point: Body mechanics (Done)       Learning Progress Summary             Patient Acceptance, E, VU,NR by RASHARD at 9/24/2023 0604    Acceptance, E, VU,DU by  at 9/18/2023 1537    Acceptance, E, VU,DU by  at 9/17/2023 1515    Acceptance, E, VU,DU by RF at 9/16/2023 1804    Acceptance, E, VU by AV at 9/13/2023 1059    Acceptance, E,TB, VU by  at 9/12/2023 1308                         Point: Precautions (Done)       Learning  Progress Summary             Patient Acceptance, E, VU,NR by RASHARD at 9/24/2023 0604    Acceptance, E, VU,DU by RF at 9/18/2023 1537    Acceptance, E, VU,DU by RF at 9/17/2023 1515    Acceptance, E, VU,DU by RF at 9/16/2023 1804    Acceptance, E, VU by AV at 9/13/2023 1059    Acceptance, E,TB, VU by  at 9/12/2023 1308                                         User Key       Initials Effective Dates Name Provider Type Discipline    RASHARD 06/16/21 -  Selena Lindquist, RN Registered Nurse Nurse    AV 06/16/21 -  Uvaldo Christine OT Occupational Therapist OT    RF 01/19/22 -  Karl Baker, RN Registered Nurse Nurse     08/16/23 -  Gamal Simmons PT Student PT Student PT                  PT Recommendation and Plan  Anticipated Discharge Disposition (PT): inpatient rehabilitation facility  Planned Therapy Interventions (PT): balance training, bed mobility training, gait training, home exercise program, neuromuscular re-education, motor coordination training, ROM (range of motion), stair training, strengthening, stretching, transfer training  Therapy Frequency (PT): daily  Progress Summary (PT)  Progress Toward Functional Goals (PT): (P) progress toward functional goals is fair  Daily Progress Summary (PT): (P) Patient was able to tolerate more functional therapeutic activity today. He stood at edge of bed for 2 minutes and performed lateral weight shifts with CGA and a rolling walker. His bed mobility and transfer assistance levels have improved as he is now a Rich for all.  Plan of Care Reviewed With: patient  Outcome Evaluation: Patient presents with severe bilateral LE strength deficits and endurance impairments that have affected his functional mobility status below his baseline. He reports that he has been NWB for weeks but was able to get around independently with an assistive device prior to that. Skilled physical therapy is required to address his deficits.   Outcome Measures       Row Name 10/18/23 1100              How much help from another person do you currently need...    Turning from your back to your side while in flat bed without using bedrails? 3 (P)   -RH      Moving from lying on back to sitting on the side of a flat bed without bedrails? 3 (P)   -RH      Moving to and from a bed to a chair (including a wheelchair)? 2 (P)   -RH      Standing up from a chair using your arms (e.g., wheelchair, bedside chair)? 3 (P)   -RH      Climbing 3-5 steps with a railing? 1 (P)   -RH      To walk in hospital room? 1 (P)   -RH      AM-PAC 6 Clicks Score (PT) 13 (P)   -RH      Highest level of mobility 4 --> Transferred to chair/commode (P)   -RH                User Key  (r) = Recorded By, (t) = Taken By, (c) = Cosigned By      Initials Name Provider Type     Gamal Simmons, PT Student PT Student                     Time Calculation:    PT Charges       Row Name 10/18/23 1102             Time Calculation    PT Received On 10/18/23 (P)   -RH         Timed Charges    64097 - PT Therapeutic Activity Minutes 15 (P)   -RH         Total Minutes    Timed Charges Total Minutes 15 (P)   -RH       Total Minutes 15 (P)   -RH                User Key  (r) = Recorded By, (t) = Taken By, (c) = Cosigned By      Initials Name Provider Type     Gamal Simmons, PT Student PT Student                      PT G-Codes  Outcome Measure Options: AM-PAC 6 Clicks Daily Activity (OT), Optimal Instrument  AM-PAC 6 Clicks Score (PT): (P) 13  AM-PAC 6 Clicks Score (OT): 15    Gamal Simmons, PT Student  10/18/2023      Electronically signed by Merlin Rao, PT at 10/18/23 1120       Oscar Shields, PT Student at 10/19/23 1511  Version 1 of 1      Attestation signed by Merlin Rao, PT at 10/19/23 1518                      Acute Care - Physical Therapy Treatment Note  [facility]     Patient Name: Jose Shaikh  : 1960  MRN: [id number]  Today's Date: 10/19/2023      Visit Dx:     ICD-10-CM ICD-9-CM   1. Generalized  weakness  R53.1 780.79   2. Hyponatremia  E87.1 276.1   3. Difficulty in walking  R26.2 719.7   4. Decreased activities of daily living (ADL)  Z78.9 V49.89   5. Difficulty walking  R26.2 719.7     Patient Active Problem List   Diagnosis    Diabetic ulcer of left heel associated with type 2 DM    Acute osteomyelitis of left calcaneus     Diabetic ulcer of left heel associated with type 2 DM    Diabetic ulcer of right midfoot associated with type 2 DM    Paroxysmal atrial fibrillation    Essential hypertension    Hyperlipidemia LDL goal <100    Cellulitis and abscess of foot    High alkaline phosphatase level    Osteomyelitis    Onychomycosis    Onychocryptosis    Foot pain, bilateral    Osteomyelitis of foot, right, acute    Cellulitis of right foot    Type 2 diabetes mellitus, with long-term current use of insulin    Class 3 severe obesity due to excess calories with serious comorbidity and body mass index (BMI) of 45.0 to 49.9 in adult    Anxiety disorder, unspecified    Claustrophobia    Dependence on wheelchair    Depression, unspecified    Long term (current) use of anticoagulants    Long term (current) use of oral hypoglycemic drugs    Wound of foot    Non-prs chronic ulcer oth prt r foot limited to brkdwn skin    Orthostatic hypotension    Other chronic osteomyelitis, right ankle and foot    Personal history of nicotine dependence    Thrombocytopenia, unspecified    Unspecified open wound, right foot, initial encounter    Diabetic foot infection    Subacute osteomyelitis of right foot    Right foot pain    Sepsis    Onychomycosis    Foot pain, left    Impaired mobility and ADLs    Absence of toe of right foot    Corns and callosity    Disability of walking    Fracture    Limb swelling    Polyneuropathy    Pressure ulcer, stage 1    Shortness of breath    Generalized weakness     Past Medical History:   Diagnosis Date    Absence of toe of right foot     Acute osteomyelitis of left calcaneus  8/18/2021     Anxiety and depression     Arthritis     Claustrophobia     Corns and callus     Diabetic ulcer of left heel associated with type 2 DM 8/18/2021    Diabetic ulcer of left heel associated with type 2 DM 7/6/2021    Diabetic ulcer of right midfoot associated with type 2 DM 8/18/2021    Difficulty walking     Essential hypertension 8/31/2021    Hammertoe     Hyperlipidemia LDL goal <100 8/31/2021    Ingrown toenail     Obesity     Paroxysmal atrial fibrillation 8/31/2021    Polyneuropathy     Pressure ulcer, stage 1     Tinea unguium     Type 2 diabetes mellitus with polyneuropathy      Past Surgical History:   Procedure Laterality Date    CYST REMOVAL      center of back; benign    INCISION AND DRAINAGE ABSCESS      back    INCISION AND DRAINAGE LEG Right 12/10/2021    Procedure: INCISION AND DRAINAGE LOWER EXTREMITY;  Surgeon: Ash Leyva DPM;  Location: [facility] MAIN OR;  Service: Podiatry;  Laterality: Right;    OTHER SURGICAL HISTORY      Surgical clips left foot    TOE SURGERY Right     Removal of 5th toe    TRANS METATARSAL AMPUTATION Right 12/2/2021    Procedure: AMPUTATION TRANS METATARSAL;  Surgeon: Ash Leyva DPM;  Location: [facility] MAIN OR;  Service: Podiatry;  Laterality: Right;    WRIST SURGERY Left     repair of injury     PT Assessment (last 12 hours)       PT Evaluation and Treatment       Row Name 10/19/23 1500          Physical Therapy Time and Intention    Subjective Information complains of;fatigue;pain (P)   -ZT     Document Type therapy note (daily note) (P)   -ZT     Mode of Treatment individual therapy;physical therapy (P)   -ZT     Patient Effort good (P)   -ZT       Row Name 10/19/23 1500          General Information    Patient Profile Reviewed yes (P)   -ZT     Patient Observations alert;cooperative;agree to therapy (P)   -ZT     Existing Precautions/Restrictions fall (P)   -ZT       Row Name 10/19/23 1500          Bed Mobility    Bed Mobility other (see comments) (P)    Pt performed TherEx in supine  -ZT       Row Name 10/19/23 1500          Transfers    Transfers other (see comments) (P)   Pt declined Tfs  -ZT       Row Name 10/19/23 1500          Gait/Stairs (Locomotion)    Gait/Stairs Locomotion other (see comments) (P)   Pt declined AMB  -ZT       Row Name 10/19/23 1500          Safety Issues, Functional Mobility    Impairments Affecting Function (Mobility) balance;endurance/activity tolerance;strength (P)   -ZT       Row Name 10/19/23 1500          Balance    Balance Assessment other (see comments) (P)   Pt performed TherEx in supine  -ZT       Row Name 10/19/23 1500          Motor Skills    Therapeutic Exercise hip;knee;ankle (P)   -ZT       Row Name 10/19/23 1500          Hip (Therapeutic Exercise)    Hip Strengthening (Therapeutic Exercise) aBduction;aDduction;20 repititions;other (see comments) (P)   Pt performed 20 reps of hip adduction/abduction and glute sets  -ZT       Row Name 10/19/23 1500          Knee (Therapeutic Exercise)    Knee Strengthening (Therapeutic Exercise) SLR (straight leg raise);20 repititions;other (see comments);10 repetitions (P)   Pt performed 20 reps of quad sets and 10 reps of SLR  -ZT       Row Name 10/19/23 1500          Ankle (Therapeutic Exercise)    Ankle Strengthening (Therapeutic Exercise) dorsiflexion;plantarflexion;20 repititions;other (see comments) (P)   Pt performed 20 ankle pumps  -ZT       Row Name             Wound 12/02/21 Right anterior foot Incision    Wound - Properties Group Placement Date: 12/02/21  -ES Side: Right  -ES Orientation: anterior  -ES Location: foot  -ES Primary Wound Type: Incision  -ES    Retired Wound - Properties Group Placement Date: 12/02/21  -ES Side: Right  -ES Orientation: anterior  -ES Location: foot  -ES Primary Wound Type: Incision  -ES    Retired Wound - Properties Group Date first assessed: 12/02/21  -ES Side: Right  -ES Location: foot  -ES Primary Wound Type: Incision  -ES      Row Name              Wound 10/17/23 0546 Bilateral perirectal MASD (Moisture associated skin damage)    Wound - Properties Group Placement Date: 10/17/23  -AS Placement Time: 0546  -AS Present on Original Admission: N  -AS Side: Bilateral  -AS Location: perirectal  -AS Primary Wound Type: MASD  -AS    Retired Wound - Properties Group Placement Date: 10/17/23  -AS Placement Time: 0546  -AS Present on Original Admission: N  -AS Side: Bilateral  -AS Location: perirectal  -AS Primary Wound Type: MASD  -AS    Retired Wound - Properties Group Date first assessed: 10/17/23  -AS Time first assessed: 0546  -AS Present on Original Admission: N  -AS Side: Bilateral  -AS Location: perirectal  -AS Primary Wound Type: MASD  -AS      Row Name 10/19/23 1500          Plan of Care Review    Plan of Care Reviewed With patient (P)   -ZT       Row Name 10/19/23 1500          Therapy Assessment/Plan (PT)    Rehab Potential (PT) good, to achieve stated therapy goals (P)   -ZT     Criteria for Skilled Interventions Met (PT) yes;skilled treatment is necessary (P)   -ZT     Therapy Frequency (PT) daily (P)   -ZT     Problem List (PT) problems related to;balance;mobility;strength (P)   -ZT     Activity Limitations Related to Problem List (PT) unable to ambulate safely;unable to transfer safely (P)   -ZT       Row Name 10/19/23 1500          Progress Summary (PT)    Progress Toward Functional Goals (PT) progress toward functional goals is good (P)   -ZT     Daily Progress Summary (PT) Pt presents in a deconditioned state secondary to recent hospital stay. He has LE pain that makes it difficult for him to move in bed. He can only tolerate small amounts of exercise before he requires a break. He continues to require skilled PT services to address these deficits so that he can safely perform ADL's. (P)   -ZT       Row Name 10/19/23 1500          Therapy Plan Review/Discharge Plan (PT)    Therapy Plan Review (PT) evaluation/treatment results reviewed;care  plan/treatment goals reviewed;participants included;patient (P)   -ZT               User Key  (r) = Recorded By, (t) = Taken By, (c) = Cosigned By      Initials Name Provider Type    AS Angelina Alfredo, RN Registered Nurse    Katlyn Garcia, RN Registered Nurse    Oscar Ward, PT Student PT Student                    Physical Therapy Education       Title: PT OT SLP Therapies (Done)       Topic: Physical Therapy (Done)       Point: Mobility training (Done)       Learning Progress Summary             Patient Acceptance, E, VU,NR by RASHARD at 9/24/2023 0604    Acceptance, E, VU,DU by  at 9/18/2023 1537    Acceptance, E, VU,DU by RF at 9/17/2023 1515    Acceptance, E, VU,DU by RF at 9/16/2023 1804    Acceptance, E, VU by AV at 9/13/2023 1059    Acceptance, E,TB, VU by  at 9/12/2023 1308                         Point: Home exercise program (Done)       Learning Progress Summary             Patient Acceptance, E, VU,NR by RASHARD at 9/24/2023 0604    Acceptance, E, VU,DU by  at 9/18/2023 1537    Acceptance, E, VU,DU by  at 9/17/2023 1515    Acceptance, E, VU,DU by  at 9/16/2023 1804    Acceptance, E, VU by AV at 9/13/2023 1059    Acceptance, E,TB, VU by  at 9/12/2023 1308                         Point: Body mechanics (Done)       Learning Progress Summary             Patient Acceptance, E, VU,NR by RASHARD at 9/24/2023 0604    Acceptance, E, VU,DU by  at 9/18/2023 1537    Acceptance, E, VU,DU by  at 9/17/2023 1515    Acceptance, E, VU,DU by  at 9/16/2023 1804    Acceptance, E, VU by AV at 9/13/2023 1059    Acceptance, E,TB, VU by  at 9/12/2023 1308                         Point: Precautions (Done)       Learning Progress Summary             Patient Acceptance, E, VU,NR by RASHARD at 9/24/2023 0604    Acceptance, E, VU,DU by  at 9/18/2023 1537    Acceptance, E, VU,DU by  at 9/17/2023 1515    Acceptance, E, VU,DU by RF at 9/16/2023 1804    Acceptance, E, VU by AV at 9/13/2023 1059    Acceptance, E,TB, VU by   at 9/12/2023 1308                                         User Key       Initials Effective Dates Name Provider Type Discipline     06/16/21 -  Selena Lindquist, RONNIE Registered Nurse Nurse     06/16/21 -  Uvaldo Christine OT Occupational Therapist OT     01/19/22 -  Karl Baker, RN Registered Nurse Nurse     08/16/23 -  Gamal Simmons, PT Student PT Student PT                  PT Recommendation and Plan  Anticipated Discharge Disposition (PT): (P) inpatient rehabilitation facility  Planned Therapy Interventions (PT): (P) balance training, bed mobility training, gait training, stair training, strengthening, transfer training  Therapy Frequency (PT): (P) daily  Progress Summary (PT)  Progress Toward Functional Goals (PT): (P) progress toward functional goals is good  Daily Progress Summary (PT): (P) Pt presents in a deconditioned state secondary to recent hospital stay. He has LE pain that makes it difficult for him to move in bed. He can only tolerate small amounts of exercise before he requires a break. He continues to require skilled PT services to address these deficits so that he can safely perform ADL's.  Plan of Care Reviewed With: (P) patient   Outcome Measures       Row Name 10/19/23 1500 10/18/23 1100          How much help from another person do you currently need...    Turning from your back to your side while in flat bed without using bedrails? 3 (P)   -ZT 3  -MOE (r) RH (t) MOE (c)     Moving from lying on back to sitting on the side of a flat bed without bedrails? 3 (P)   -ZT 3  -MOE (r) RH (t) MOE (c)     Moving to and from a bed to a chair (including a wheelchair)? 2 (P)   -ZT 2  -MOE (r) RH (t) MOE (c)     Standing up from a chair using your arms (e.g., wheelchair, bedside chair)? 2 (P)   -ZT 3  -MOE (r) RH (t) MOE (c)     Climbing 3-5 steps with a railing? 1 (P)   -ZT 1  -MOE (r) RH (t) MOE (c)     To walk in hospital room? 1 (P)   -ZT 1  -MOE (r) RH (t) MOE (c)     AM-PAC 6 Clicks Score (PT) 12  (P)   -ZT 13  -MOE (r) RH (t)     Highest level of mobility 4 --> Transferred to chair/commode (P)   -ZT 4 --> Transferred to chair/commode  -MOE (r) RH (t)               User Key  (r) = Recorded By, (t) = Taken By, (c) = Cosigned By      Initials Name Provider Type    Merlin De La O, PT Physical Therapist     Gamal Simmons, PT Student PT Student    ZT Oscar Shields, PT Student PT Student                     Time Calculation:    PT Charges       Row Name 10/19/23 1505             Time Calculation    PT Received On 10/19/23 (P)   -ZT         Timed Charges    56968 - PT Therapeutic Exercise Minutes 15 (P)   -ZT         Total Minutes    Timed Charges Total Minutes 15 (P)   -ZT       Total Minutes 15 (P)   -ZT                User Key  (r) = Recorded By, (t) = Taken By, (c) = Cosigned By      Initials Name Provider Type    ZT Oscar Shields, PT Student PT Student                      PT G-Codes  Outcome Measure Options: AM-PAC 6 Clicks Daily Activity (OT), Optimal Instrument  AM-PAC 6 Clicks Score (PT): (P) 12  AM-PAC 6 Clicks Score (OT): 15    Oscar Shields, PT Student  10/19/2023      Electronically signed by Merlin Rao, PT at 10/19/23 1518          Occupational Therapy Notes (last 72 hours)        Uvaldo Christine, OT at 10/17/23 1036          Patient Name: Jose Shaikh  : 1960    MRN: [id number]                              Today's Date: 10/17/2023       Admit Date: 9/10/2023    Visit Dx:     ICD-10-CM ICD-9-CM   1. Generalized weakness  R53.1 780.79   2. Hyponatremia  E87.1 276.1   3. Difficulty in walking  R26.2 719.7   4. Decreased activities of daily living (ADL)  Z78.9 V49.89   5. Difficulty walking  R26.2 719.7     Patient Active Problem List   Diagnosis    Diabetic ulcer of left heel associated with type 2 DM    Acute osteomyelitis of left calcaneus     Diabetic ulcer of left heel associated with type 2 DM    Diabetic ulcer of right midfoot associated with type 2 DM     Paroxysmal atrial fibrillation    Essential hypertension    Hyperlipidemia LDL goal <100    Cellulitis and abscess of foot    High alkaline phosphatase level    Osteomyelitis    Onychomycosis    Onychocryptosis    Foot pain, bilateral    Osteomyelitis of foot, right, acute    Cellulitis of right foot    Type 2 diabetes mellitus, with long-term current use of insulin    Class 3 severe obesity due to excess calories with serious comorbidity and body mass index (BMI) of 45.0 to 49.9 in adult    Anxiety disorder, unspecified    Claustrophobia    Dependence on wheelchair    Depression, unspecified    Long term (current) use of anticoagulants    Long term (current) use of oral hypoglycemic drugs    Wound of foot    Non-prs chronic ulcer oth prt r foot limited to brkdwn skin    Orthostatic hypotension    Other chronic osteomyelitis, right ankle and foot    Personal history of nicotine dependence    Thrombocytopenia, unspecified    Unspecified open wound, right foot, initial encounter    Diabetic foot infection    Subacute osteomyelitis of right foot    Right foot pain    Sepsis    Onychomycosis    Foot pain, left    Impaired mobility and ADLs    Absence of toe of right foot    Corns and callosity    Disability of walking    Fracture    Limb swelling    Polyneuropathy    Pressure ulcer, stage 1    Shortness of breath    Generalized weakness     Past Medical History:   Diagnosis Date    Absence of toe of right foot     Acute osteomyelitis of left calcaneus  8/18/2021    Anxiety and depression     Arthritis     Claustrophobia     Corns and callus     Diabetic ulcer of left heel associated with type 2 DM 8/18/2021    Diabetic ulcer of left heel associated with type 2 DM 7/6/2021    Diabetic ulcer of right midfoot associated with type 2 DM 8/18/2021    Difficulty walking     Essential hypertension 8/31/2021    Hammertoe     Hyperlipidemia LDL goal <100 8/31/2021    Ingrown toenail     Obesity     Paroxysmal atrial fibrillation  8/31/2021    Polyneuropathy     Pressure ulcer, stage 1     Tinea unguium     Type 2 diabetes mellitus with polyneuropathy      Past Surgical History:   Procedure Laterality Date    CYST REMOVAL      center of back; benign    INCISION AND DRAINAGE ABSCESS      back    INCISION AND DRAINAGE LEG Right 12/10/2021    Procedure: INCISION AND DRAINAGE LOWER EXTREMITY;  Surgeon: Ash Leyva DPM;  Location: [facility] MAIN OR;  Service: Podiatry;  Laterality: Right;    OTHER SURGICAL HISTORY      Surgical clips left foot    TOE SURGERY Right     Removal of 5th toe    TRANS METATARSAL AMPUTATION Right 12/2/2021    Procedure: AMPUTATION TRANS METATARSAL;  Surgeon: Ash Leyva DPM;  Location: [facility] MAIN OR;  Service: Podiatry;  Laterality: Right;    WRIST SURGERY Left     repair of injury      General Information       Row Name 10/17/23 1032          OT Time and Intention    Document Type therapy note (daily note)  -AV     Mode of Treatment individual therapy;occupational therapy  -AV       Row Name 10/17/23 1032          General Information    Existing Precautions/Restrictions fall  -AV       Row Name 10/17/23 1032          Cognition    Orientation Status (Cognition) --  Able to perform therapeutic exercises with minimal cues and demonstration.  -AV       Row Name 10/17/23 1032          Safety Issues, Functional Mobility    Impairments Affecting Function (Mobility) balance;endurance/activity tolerance;strength  -AV               User Key  (r) = Recorded By, (t) = Taken By, (c) = Cosigned By      Initials Name Provider Type    AV Uvaldo Christine OT Occupational Therapist                     Mobility/ADL's    No documentation.                  Obj/Interventions       Row Name 10/17/23 1032          Shoulder (Therapeutic Exercise)    Shoulder Strengthening (Therapeutic Exercise) bilateral;flexion;horizontal aBduction/aDduction;3 lb free weight;20 repititions  -AV       Row Name 10/17/23 1032           Elbow/Forearm (Therapeutic Exercise)    Elbow/Forearm Strengthening (Therapeutic Exercise) bilateral;flexion;extension;supination;pronation;3 lb free weight;20 repititions  -AV       Row Name 10/17/23 1032          Motor Skills    Therapeutic Exercise shoulder;elbow/forearm  Performed in high Fowlers/on room air  -AV               User Key  (r) = Recorded By, (t) = Taken By, (c) = Cosigned By      Initials Name Provider Type    Uvaldo Hardy OT Occupational Therapist                   Goals/Plan    No documentation.                  Clinical Impression       Row Name 10/17/23 1033          Pain Scale: FACES Pre/Post-Treatment    Pain: FACES Scale, Pretreatment 0-->no hurt  -AV     Posttreatment Pain Rating 0-->no hurt  -AV       Row Name 10/17/23 1033          Plan of Care Review    Progress improving  Able to increase resistance to 3 pounds  -AV     Outcome Evaluation Patient performed upper extremity therapeutic exercises with 3 pound resistance to improve endurance/activity tolerance needed to support ADLs.  Continued OT indicated to remediate/compensate for deficits to maximize independence.  -AV       Row Name 10/17/23 1033          Vital Signs    O2 Delivery Pre Treatment room air  -AV     O2 Delivery Intra Treatment room air  -AV     O2 Delivery Post Treatment room air  -AV               User Key  (r) = Recorded By, (t) = Taken By, (c) = Cosigned By      Initials Name Provider Type    Uvaldo Hardy OT Occupational Therapist                   Outcome Measures       Row Name 10/17/23 1034          How much help from another is currently needed...    Putting on and taking off regular lower body clothing? 1  -AV     Bathing (including washing, rinsing, and drying) 2  -AV     Toileting (which includes using toilet bed pan or urinal) 2  -AV     Putting on and taking off regular upper body clothing 3  -AV     Taking care of personal grooming (such as brushing teeth) 3  -AV     Eating meals 4  -AV      AM-PAC 6 Clicks Score (OT) 15  -AV       Row Name 10/17/23 1034          Optimal Instrument    Bending/Stooping 5  -AV     Standing 5  -AV     Reaching 1  -AV               User Key  (r) = Recorded By, (t) = Taken By, (c) = Cosigned By      Initials Name Provider Type    Uvaldo Hardy OT Occupational Therapist                    Occupational Therapy Education       Title: PT OT SLP Therapies (Done)       Topic: Occupational Therapy (Done)       Point: ADL training (Done)       Description:   Instruct learner(s) on proper safety adaptation and remediation techniques during self care or transfers.   Instruct in proper use of assistive devices.                  Learning Progress Summary             Patient Acceptance, E, VU,NR by RASHARD at 9/24/2023 0604    Acceptance, E, VU,DU by RF at 9/18/2023 1537    Acceptance, E, VU,DU by RF at 9/17/2023 1515    Acceptance, E, VU,DU by RF at 9/16/2023 1804    Acceptance, E, VU by AV at 9/13/2023 1059                         Point: Home exercise program (Done)       Description:   Instruct learner(s) on appropriate technique for monitoring, assisting and/or progressing therapeutic exercises/activities.                  Learning Progress Summary             Patient Acceptance, E, VU,NR by RASHARD at 9/24/2023 0604    Acceptance, E, VU,DU by RF at 9/18/2023 1537    Acceptance, E, VU,DU by RF at 9/17/2023 1515    Acceptance, E, VU,DU by RF at 9/16/2023 1804    Acceptance, E, VU by AV at 9/13/2023 1059                         Point: Precautions (Done)       Description:   Instruct learner(s) on prescribed precautions during self-care and functional transfers.                  Learning Progress Summary             Patient Acceptance, E, VU,NR by RASHARD at 9/24/2023 0604    Acceptance, E, VU,DU by RF at 9/18/2023 1537    Acceptance, E, VU,DU by RF at 9/17/2023 1515    Acceptance, E, VU,DU by RF at 9/16/2023 1804    Acceptance, E, VU by AV at 9/13/2023 1059                         Point: Body  mechanics (Done)       Description:   Instruct learner(s) on proper positioning and spine alignment during self-care, functional mobility activities and/or exercises.                  Learning Progress Summary             Patient Acceptance, E, VU,NR by  at 9/24/2023 0604    Acceptance, E, VU,DU by  at 9/18/2023 1537    Acceptance, E, VU,DU by RF at 9/17/2023 1515    Acceptance, E, VU,DU by RF at 9/16/2023 1804    Acceptance, E, VU by  at 9/13/2023 1059                                         User Key       Initials Effective Dates Name Provider Type Discipline     06/16/21 -  Selena Lindquist, RN Registered Nurse Nurse     06/16/21 -  Uvaldo Christine OT Occupational Therapist OT     01/19/22 -  Karl Baker, RN Registered Nurse Nurse                  OT Recommendation and Plan  Planned Therapy Interventions (OT): activity tolerance training, BADL retraining, functional balance retraining, occupation/activity based interventions, patient/caregiver education/training, transfer/mobility retraining  Therapy Frequency (OT): 5 times/wk  Plan of Care Review  Plan of Care Reviewed With: patient  Progress: improving (Able to increase resistance to 3 pounds)  Outcome Evaluation: Patient performed upper extremity therapeutic exercises with 3 pound resistance to improve endurance/activity tolerance needed to support ADLs.  Continued OT indicated to remediate/compensate for deficits to maximize independence.     Time Calculation:   Evaluation Complexity (OT)  Review Occupational Profile/Medical/Therapy History Complexity: expanded/moderate complexity  Assessment, Occupational Performance/Identification of Deficit Complexity: 3-5 performance deficits  Clinical Decision Making Complexity (OT): problem focused assessment/low complexity  Overall Complexity of Evaluation (OT): low complexity     Time Calculation- OT       Row Name 10/17/23 1035             Time Calculation- OT    OT Received On 10/17/23  -      OT Goal  Re-Cert Due Date 10/22/23  -AV         Timed Charges    40790 - OT Therapeutic Exercise Minutes 10  -AV         Total Minutes    Timed Charges Total Minutes 10  -AV       Total Minutes 10  -AV                User Key  (r) = Recorded By, (t) = Taken By, (c) = Cosigned By      Initials Name Provider Type    Uvaldo Hardy OT Occupational Therapist                  Therapy Charges for Today       Code Description Service Date Service Provider Modifiers Qty    36854767305 HC OT THER PROC EA 15 MIN 10/17/2023 Uvaldo Christine OT GO 1                 Uvaldo Christine OT  10/17/2023    Electronically signed by Uvaldo Christine OT at 10/17/23 1036

## 2023-10-20 NOTE — PROGRESS NOTES
[facility]   Hospitalist Progress Note  Date: 10/20/2023  Patient Name: Jose Shaikh  : 1960  MRN: [id number]  Date of admission: 9/10/2023      Subjective   Subjective     Chief complaint: Weakness    Summary:   62-year-old male initially hospitalized on 9/10/2023, prolonged hospitalization for treatment of management of generalized weakness, deconditioning, with acute issues of diarrhea, C. difficile negative.  Diarrhea improved.  Had small hematoma after ambulating on his foot.  Unfortunately with exhaustive efforts to try to place this gentleman after 39 days of hospitalization, we are unable to find a facility that will accept him.  He has no further acute needs or requirements for inpatient monitoring and management, his labs and vitals are stable, he is tolerating oral intake, and has been refusing turns and repositionings and other interventions with nursing staff despite recommendations.  Patient discharged in hemodynamically stable addition on 10/19/2023 to follow-up with PCP within 1 week.  Unfortunately we cannot solve all of his social issues during this hospitalization due to lack of resources and participation from the patient's perspective despite all our efforts.  Patient has a financial means of making arrangements at home.  Patient appealing his discharge.    Interval follow-up: Seen and examined, no acute distress, no acute major overnight events, still awaiting bed appeal process for disposition.  No new acute medical issues.  Blood sugars adequately controlled in the 100 range.      Review of systems:  All systems reviewed negative except generalized fatigue, generalized weakness    Objective   Objective     Vitals:   Temp:  [97.5 °F (36.4 °C)-98.4 °F (36.9 °C)] 98.4 °F (36.9 °C)  Heart Rate:  [] 95  Resp:  [18-20] 18  BP: (115-129)/(60-71) 128/62  Physical Exam   Constitutional: Awake alert oriented no acute distress  Respiratory clear  Cardiovascular: RRR  GI: Abdomen  "soft nontender bowel sounds present     Result Review    Result Review:  I have personally reviewed the pertinent results from the past 24 hours to 10/20/2023 14:22 EDT and agree with these findings:  [x]  Laboratory   CBC          10/11/2023    06:05 10/15/2023    05:00 10/18/2023    04:56   CBC   WBC 5.58  6.30  4.43    RBC 4.54  4.19  3.77    Hemoglobin 11.6  10.6  9.7    Hematocrit 37.1  34.8  30.8    MCV 81.7  83.1  81.7    MCH 25.6  25.3  25.7    MCHC 31.3  30.5  31.5    RDW 17.4  18.1  17.0    Platelets 105  110  106      BMP          10/11/2023    06:05 10/15/2023    05:00 10/18/2023    04:56   BMP   BUN 17  18  15    Creatinine 0.52  0.48  0.62    Sodium 133  136  135    Potassium 4.3  3.9  4.1    Chloride 102  105  106    CO2 25.1  22.9  21.9    Calcium 8.9  8.6  8.3      LIVER FUNCTION TESTS:      Lab 10/18/23  0456   TOTAL PROTEIN 5.4*   ALBUMIN 2.8*   ALT (SGPT) 39   AST (SGOT) 32   BILIRUBIN 0.4   INDIRECT BILIRUBIN 0.2   BILIRUBIN DIRECT 0.2   ALK PHOS 223*       [x]  Microbiology No results found for: \"ACANTHNAEG\", \"AFBCX\", \"BPERTUSSISCX\", \"BLOODCX\"  No results found for: \"BCIDPCR\", \"CXREFLEX\", \"CSFCX\", \"CULTURETIS\"  No results found for: \"CULTURES\", \"HSVCX\", \"URCX\"  No results found for: \"EYECULTURE\", \"GCCX\", \"HSVCULTURE\", \"LABHSV\"  No results found for: \"LEGIONELLA\", \"MRSACX\", \"MUMPSCX\", \"MYCOPLASCX\"  No results found for: \"NOCARDIACX\", \"STOOLCX\"  No results found for: \"THROATCX\", \"UNSTIMCULT\", \"URINECX\", \"CULTURE\", \"VZVCULTUR\"  No results found for: \"VIRALCULTU\", \"WOUNDCX\"    [x]  Radiology [place] Guide For Pain Meds Inj    Result Date: 9/22/2023  PROCEDURE: [place] GUIDE FOR PAIN MEDS INJECTION MAJOR JOINT  COMPARISON: None  INDICATIONS: severe OA. LEFT HIP PAIN. INITIAL PAIN- 10/10.  POST INJECTION PAIN-  0/10.  CONSENT: Risks and benefits were discussed with the patient including but not limited to risk of bleeding, infection, allergic reaction/reaction to medications used and/or injury to adjacent " "structures. Alternatives were discussed with the patient. The patient verbalized understanding and elected to proceed with the procedure.  TECHNIQUE/FINDINGS: Prior to the start of the procedure, patient rated their pain as a 10/10. Patient was placed in supine positioning on the fluoro table and the left hip  was localized under fluoroscopy, site was marked with an \"X\".  The overlying skin was prepped and draped in sterile fashion.  The skin was infiltrated with local anesthesia over the insertion site with 12 mL of 2% lidocaine to anesthetize the site, aspiration occurred after needle advancement to minimize risk of inadvertently administering lidocaine into a vessel. A 22 gauge needle was then advanced to gain access into joint space.  Initially, A small amount of contrast dye injected to verify correct needle placement. Next, a steroid injection (1ml - Depo-Medrol 80 mg and 2ml - bupivacaine 0.75%) was injected into the joint space.  Afterwards, the needle was then removed and a bandage was applied. Post-procedure patient rated their pain level a 0/10. Limited spot fluoro images were obtained.       Successful steroid joint injection of the left hip was performed without complication, patient tolerated procedure.  The patient was instructed to obtain follow up care from the referring physician.     Exam directly supervised by Dr. Cole WEINER       Electronically Signed and Approved By: Slava Galvan M.D. on 9/22/2023 at 16:42               []  EKG/Telemetry   []  Cardiology/Vascular   []  Pathology  [x]  Old records  []  Other:    Assessment & Plan   Assessment / Plan     Assessment/Plan:  Assessment:  Generalized weakness  Deconditioning  Type 2 diabetes mellitus  Essential hypertension  Chronic paroxysmal atrial fibrillation on Eliquis  Obesity (BMI: 42.88)  Debility with wheelchair dependence  Severe degenerative joint disease of lower extremities  Chronic wound  Thrombocytopenia     Plan:  Labs and " imaging reviewed  No acute new medical issues to be addressed  Continue blood sugar control with current regimen Levemir, Humalog  Patient awaiting appeal process for disposition  Patient can disposition home at any time  Continue Florastor  Continue as needed Imodium and scheduled Questran  Continue other medications as ordered  PT/OT/ consulted and following    Discussed with RN    DVT prophylaxis:  Medical DVT prophylaxis orders are present.    CODE STATUS:   Code Status (Patient has no pulse and is not breathing): CPR (Attempt to Resuscitate)  Medical Interventions (Patient has pulse or is breathing): Full Support        Electronically signed by Cailin Acosta MD, 10/20/23, 2:22 PM EDT.    Portions of this documentation were transcribed electronically from a voice recognition software.  I confirm all data accurately represents the service(s) I performed at today's visit.

## 2023-10-21 LAB
GLUCOSE BLDC GLUCOMTR-MCNC: 152 MG/DL (ref 70–99)
GLUCOSE BLDC GLUCOMTR-MCNC: 184 MG/DL (ref 70–99)
GLUCOSE BLDC GLUCOMTR-MCNC: 191 MG/DL (ref 70–99)
GLUCOSE BLDC GLUCOMTR-MCNC: 197 MG/DL (ref 70–99)

## 2023-10-21 PROCEDURE — 99231 SBSQ HOSP IP/OBS SF/LOW 25: CPT | Performed by: FAMILY MEDICINE

## 2023-10-21 PROCEDURE — 82948 REAGENT STRIP/BLOOD GLUCOSE: CPT

## 2023-10-21 PROCEDURE — 63710000001 INSULIN DETEMIR PER 5 UNITS: Performed by: INTERNAL MEDICINE

## 2023-10-21 PROCEDURE — 63710000001 INSULIN LISPRO (HUMAN) PER 5 UNITS: Performed by: INTERNAL MEDICINE

## 2023-10-21 RX ADMIN — BACITRACIN 0.9 G: 500 OINTMENT TOPICAL at 10:06

## 2023-10-21 RX ADMIN — ATORVASTATIN CALCIUM 10 MG: 10 TABLET, FILM COATED ORAL at 21:03

## 2023-10-21 RX ADMIN — INSULIN LISPRO 4 UNITS: 100 INJECTION, SOLUTION INTRAVENOUS; SUBCUTANEOUS at 12:44

## 2023-10-21 RX ADMIN — FAMOTIDINE 40 MG: 20 TABLET, FILM COATED ORAL at 08:19

## 2023-10-21 RX ADMIN — APIXABAN 5 MG: 5 TABLET, FILM COATED ORAL at 21:03

## 2023-10-21 RX ADMIN — EMPAGLIFLOZIN 10 MG: 10 TABLET, FILM COATED ORAL at 08:19

## 2023-10-21 RX ADMIN — INSULIN DETEMIR 45 UNITS: 100 INJECTION, SOLUTION SUBCUTANEOUS at 08:18

## 2023-10-21 RX ADMIN — BACITRACIN 0.9 G: 500 OINTMENT TOPICAL at 21:08

## 2023-10-21 RX ADMIN — INSULIN LISPRO 4 UNITS: 100 INJECTION, SOLUTION INTRAVENOUS; SUBCUTANEOUS at 21:04

## 2023-10-21 RX ADMIN — HYDROCODONE BITARTRATE AND ACETAMINOPHEN 1 TABLET: 7.5; 325 TABLET ORAL at 10:57

## 2023-10-21 RX ADMIN — HYDROCODONE BITARTRATE AND ACETAMINOPHEN 1 TABLET: 7.5; 325 TABLET ORAL at 16:41

## 2023-10-21 RX ADMIN — Medication 250 MG: at 21:03

## 2023-10-21 RX ADMIN — MICONAZOLE NITRATE 1 APPLICATION: 2 POWDER TOPICAL at 21:07

## 2023-10-21 RX ADMIN — INSULIN LISPRO 5 UNITS: 100 INJECTION, SOLUTION INTRAVENOUS; SUBCUTANEOUS at 17:39

## 2023-10-21 RX ADMIN — LISINOPRIL 2.5 MG: 2.5 TABLET ORAL at 08:19

## 2023-10-21 RX ADMIN — IBUPROFEN 400 MG: 400 TABLET, FILM COATED ORAL at 14:58

## 2023-10-21 RX ADMIN — INSULIN LISPRO 4 UNITS: 100 INJECTION, SOLUTION INTRAVENOUS; SUBCUTANEOUS at 08:18

## 2023-10-21 RX ADMIN — PREGABALIN 75 MG: 75 CAPSULE ORAL at 08:19

## 2023-10-21 RX ADMIN — INSULIN LISPRO 4 UNITS: 100 INJECTION, SOLUTION INTRAVENOUS; SUBCUTANEOUS at 17:39

## 2023-10-21 RX ADMIN — PREGABALIN 75 MG: 75 CAPSULE ORAL at 21:03

## 2023-10-21 RX ADMIN — INSULIN LISPRO 5 UNITS: 100 INJECTION, SOLUTION INTRAVENOUS; SUBCUTANEOUS at 12:44

## 2023-10-21 RX ADMIN — APIXABAN 5 MG: 5 TABLET, FILM COATED ORAL at 08:19

## 2023-10-21 RX ADMIN — CHOLESTYRAMINE 1 PACKET: 4 POWDER, FOR SUSPENSION ORAL at 08:19

## 2023-10-21 RX ADMIN — MICONAZOLE NITRATE 1 APPLICATION: 2 POWDER TOPICAL at 10:06

## 2023-10-21 RX ADMIN — PREGABALIN 75 MG: 75 CAPSULE ORAL at 16:41

## 2023-10-21 RX ADMIN — BACLOFEN 10 MG: 10 TABLET ORAL at 16:41

## 2023-10-21 RX ADMIN — IBUPROFEN 400 MG: 400 TABLET, FILM COATED ORAL at 21:03

## 2023-10-21 RX ADMIN — BACLOFEN 10 MG: 10 TABLET ORAL at 08:19

## 2023-10-21 RX ADMIN — CYANOCOBALAMIN TAB 500 MCG 1000 MCG: 500 TAB at 08:20

## 2023-10-21 RX ADMIN — IBUPROFEN 400 MG: 400 TABLET, FILM COATED ORAL at 08:19

## 2023-10-21 RX ADMIN — Medication 250 MG: at 08:19

## 2023-10-21 RX ADMIN — INSULIN DETEMIR 45 UNITS: 100 INJECTION, SOLUTION SUBCUTANEOUS at 21:04

## 2023-10-21 RX ADMIN — Medication: at 10:06

## 2023-10-21 RX ADMIN — HYDROCODONE BITARTRATE AND ACETAMINOPHEN 1 TABLET: 7.5; 325 TABLET ORAL at 04:55

## 2023-10-21 RX ADMIN — INSULIN LISPRO 5 UNITS: 100 INJECTION, SOLUTION INTRAVENOUS; SUBCUTANEOUS at 08:18

## 2023-10-21 RX ADMIN — HYDROCORTISONE 1 APPLICATION: 1 OINTMENT TOPICAL at 10:06

## 2023-10-21 NOTE — PROGRESS NOTES
[facility]   Hospitalist Progress Note  Date: 10/21/2023  Patient Name: Jose Shaikh  : 1960  MRN: [id number]  Date of admission: 9/10/2023      Subjective   Subjective   Chief complaint: Weakness    Summary:   62-year-old male initially hospitalized on 9/10/2023, prolonged hospitalization for treatment of management of generalized weakness, deconditioning, with acute issues of diarrhea, C. difficile negative.  Diarrhea improved.  Had small hematoma after ambulating on his foot.  Unfortunately with exhaustive efforts to try to place this gentleman after 39 days of hospitalization, we are unable to find a facility that will accept him.  He has no further acute needs or requirements for inpatient monitoring and management, his labs and vitals are stable, he is tolerating oral intake, and has been refusing turns and repositionings and other interventions with nursing staff despite recommendations.  Patient discharged in hemodynamically stable addition on 10/19/2023 to follow-up with PCP within 1 week.  Unfortunately we cannot solve all of his social issues during this hospitalization due to lack of resources and participation from the patient's perspective despite all our efforts.  Patient has a financial means of making arrangements at home.  Patient appealed his discharge.  Lost his appeal.  LCD: 10/20/23, PFL beginning: 10/21/23 @ 12pm.    Interval follow-up: Seen and examined, no acute distress, no acute major overnight events, was told he lost his appeal.  Question whether or not there is reconsideration process being pursued.  Still no new medical issues requiring inpatient monitoring and management. Blood sugars remain adequately controlled in the 100 range.      Review of systems:  All systems reviewed negative except generalized fatigue, generalized weakness      Objective   Objective     Vitals:   Temp:  [97.7 °F (36.5 °C)-98.2 °F (36.8 °C)] 97.7 °F (36.5 °C)  Heart Rate:  [79-90] 79  Resp:   "[16-18] 16  BP: (106-124)/(60-80) 106/80  Physical Exam   Constitutional: Awake alert oriented no acute distress pleasant mood  Respiratory clear  Cardiovascular: RRR  GI: Abdomen soft nontender bowel sounds present     Result Review    Result Review:  I have personally reviewed the pertinent results from the past 24 hours to 10/21/2023 14:07 EDT and agree with these findings:  [x]  Laboratory   CBC          10/11/2023    06:05 10/15/2023    05:00 10/18/2023    04:56   CBC   WBC 5.58  6.30  4.43    RBC 4.54  4.19  3.77    Hemoglobin 11.6  10.6  9.7    Hematocrit 37.1  34.8  30.8    MCV 81.7  83.1  81.7    MCH 25.6  25.3  25.7    MCHC 31.3  30.5  31.5    RDW 17.4  18.1  17.0    Platelets 105  110  106      BMP          10/11/2023    06:05 10/15/2023    05:00 10/18/2023    04:56   BMP   BUN 17  18  15    Creatinine 0.52  0.48  0.62    Sodium 133  136  135    Potassium 4.3  3.9  4.1    Chloride 102  105  106    CO2 25.1  22.9  21.9    Calcium 8.9  8.6  8.3      LIVER FUNCTION TESTS:      Lab 10/18/23  0456   TOTAL PROTEIN 5.4*   ALBUMIN 2.8*   ALT (SGPT) 39   AST (SGOT) 32   BILIRUBIN 0.4   INDIRECT BILIRUBIN 0.2   BILIRUBIN DIRECT 0.2   ALK PHOS 223*       [x]  Microbiology No results found for: \"ACANTHNAEG\", \"AFBCX\", \"BPERTUSSISCX\", \"BLOODCX\"  No results found for: \"BCIDPCR\", \"CXREFLEX\", \"CSFCX\", \"CULTURETIS\"  No results found for: \"CULTURES\", \"HSVCX\", \"URCX\"  No results found for: \"EYECULTURE\", \"GCCX\", \"HSVCULTURE\", \"LABHSV\"  No results found for: \"LEGIONELLA\", \"MRSACX\", \"MUMPSCX\", \"MYCOPLASCX\"  No results found for: \"NOCARDIACX\", \"STOOLCX\"  No results found for: \"THROATCX\", \"UNSTIMCULT\", \"URINECX\", \"CULTURE\", \"VZVCULTUR\"  No results found for: \"VIRALCULTU\", \"WOUNDCX\"    [x]  Radiology FL Guide For Pain Meds Inj    Result Date: 9/22/2023  PROCEDURE: [place] GUIDE FOR PAIN MEDS INJECTION MAJOR JOINT  COMPARISON: None  INDICATIONS: severe OA. LEFT HIP PAIN. INITIAL PAIN- 10/10.  POST INJECTION PAIN-  0/10.  CONSENT: Risks " "and benefits were discussed with the patient including but not limited to risk of bleeding, infection, allergic reaction/reaction to medications used and/or injury to adjacent structures. Alternatives were discussed with the patient. The patient verbalized understanding and elected to proceed with the procedure.  TECHNIQUE/FINDINGS: Prior to the start of the procedure, patient rated their pain as a 10/10. Patient was placed in supine positioning on the fluoro table and the left hip  was localized under fluoroscopy, site was marked with an \"X\".  The overlying skin was prepped and draped in sterile fashion.  The skin was infiltrated with local anesthesia over the insertion site with 12 mL of 2% lidocaine to anesthetize the site, aspiration occurred after needle advancement to minimize risk of inadvertently administering lidocaine into a vessel. A 22 gauge needle was then advanced to gain access into joint space.  Initially, A small amount of contrast dye injected to verify correct needle placement. Next, a steroid injection (1ml - Depo-Medrol 80 mg and 2ml - bupivacaine 0.75%) was injected into the joint space.  Afterwards, the needle was then removed and a bandage was applied. Post-procedure patient rated their pain level a 0/10. Limited spot fluoro images were obtained.       Successful steroid joint injection of the left hip was performed without complication, patient tolerated procedure.  The patient was instructed to obtain follow up care from the referring physician.     Exam directly supervised by Dr. Cole WEINER       Electronically Signed and Approved By: Slava Galvan M.D. on 9/22/2023 at 16:42               []  EKG/Telemetry   []  Cardiology/Vascular   []  Pathology  [x]  Old records  []  Other:    Assessment & Plan   Assessment / Plan     Assessment/Plan:  Assessment:  Generalized weakness  Deconditioning  Type 2 diabetes mellitus  Essential hypertension  Chronic paroxysmal atrial fibrillation on " Eliquis  Obesity (BMI: 42.88)  Debility with wheelchair dependence  Severe degenerative joint disease of lower extremities  Chronic wound  Thrombocytopenia     Plan:  Labs and imaging reviewed  Nothing new going on, he is still working on re-appealing his discharge appeal  No acute new medical issues to be addressed  Continue blood sugar control with current regimen Levemir, Humalog  Patient awaiting appeal process for disposition  Patient can be discharged home at any time  Continue Florastor  Continue as needed Imodium and scheduled Questran  Continue other medications as ordered  PT/OT/ consulted and following    Discussed with RN    DVT prophylaxis:  Medical DVT prophylaxis orders are present.    CODE STATUS:   Code Status (Patient has no pulse and is not breathing): CPR (Attempt to Resuscitate)  Medical Interventions (Patient has pulse or is breathing): Full Support    Electronically signed by Cailin Acosta MD, 10/21/23, 2:12 PM EDT.  Portions of this documentation were transcribed electronically from a voice recognition software.  I confirm all data accurately represents the service(s) I performed at today's visit.

## 2023-10-21 NOTE — CASE MANAGEMENT/SOCIAL WORK
10/23/23 -    1321: Received call from Ericka lehman/ [employer] regarding reconsideration determination. 2nd physician reviewer upheld discharge. Patient lost reconsideration appeal. Last covered day remains 10/20/23; patient financial liability remains 10/21/23 @ 12pm. Ericka stated she has left a VM informing patient of determination. Paula NICHOLAS, updated.    10/21/23 -    0745: Received VM from [employer] representative, Estefany, yesterday at 1733 w/ appeal determination. [employer] RICE upheld discharge, patient lost appeal.    LCD: 10/20/23  PFL beginning: 10/21/23 @ 12pm    ++Estefany stated they have informed beneficiary of their decision & that the patient has requested a reconsideration. Opal NICHOLAS, updated.++    1002: Spoke w/ pt via phone. Mr. Shaikh stated that the [employer] representative he spoke to yesterday was initiating the reconsideration process - awaiting new case ID#, URN to f/u.    1125: HINN 12 delivered to patient. Notice reviewed at bedside. Patient acknowledged and v/u. Signature obtained. Copy of notice given to patient and placed in patient's folder at nurse's station.

## 2023-10-21 NOTE — PLAN OF CARE
Goal Outcome Evaluation:  Plan of Care Reviewed With: patient        Progress: no change  Outcome Evaluation: prn meds for pain given at patient requests per orders. activity and turns encouraged, patient refuses turns routinely stating he self turns in bed when he wants to, calls for staff to reposition further when needing pulled up in bed. skin/wound care per orders. patient informed of denial of appeals regarding discharge, patient refusing to go home today regardless to cost to self, md made aware. patient is stating he would be fine going home tuesday and would discuss with md tomorrow. no new issues/needs noted at this time.

## 2023-10-21 NOTE — PLAN OF CARE
Goal Outcome Evaluation:  Plan of Care Reviewed With: patient        Progress: no change  Pt verbalizes anxiety about his quality of life after being discharged. Treated for pain & spasms per MAR. Wound and skin care completed. Blood glucose monitored. Pt refuses turns throughout shift. Educated on measures to protect skin. Otherwise, denies any needs this shift. None observed at this time. VS WDL.

## 2023-10-22 LAB
GLUCOSE BLDC GLUCOMTR-MCNC: 152 MG/DL (ref 70–99)
GLUCOSE BLDC GLUCOMTR-MCNC: 155 MG/DL (ref 70–99)
GLUCOSE BLDC GLUCOMTR-MCNC: 170 MG/DL (ref 70–99)
GLUCOSE BLDC GLUCOMTR-MCNC: 204 MG/DL (ref 70–99)

## 2023-10-22 PROCEDURE — 82948 REAGENT STRIP/BLOOD GLUCOSE: CPT

## 2023-10-22 PROCEDURE — 63710000001 INSULIN LISPRO (HUMAN) PER 5 UNITS: Performed by: INTERNAL MEDICINE

## 2023-10-22 PROCEDURE — 99231 SBSQ HOSP IP/OBS SF/LOW 25: CPT | Performed by: FAMILY MEDICINE

## 2023-10-22 PROCEDURE — 63710000001 INSULIN DETEMIR PER 5 UNITS: Performed by: INTERNAL MEDICINE

## 2023-10-22 RX ADMIN — INSULIN LISPRO 4 UNITS: 100 INJECTION, SOLUTION INTRAVENOUS; SUBCUTANEOUS at 17:05

## 2023-10-22 RX ADMIN — INSULIN DETEMIR 45 UNITS: 100 INJECTION, SOLUTION SUBCUTANEOUS at 21:05

## 2023-10-22 RX ADMIN — INSULIN LISPRO 8 UNITS: 100 INJECTION, SOLUTION INTRAVENOUS; SUBCUTANEOUS at 21:04

## 2023-10-22 RX ADMIN — IBUPROFEN 400 MG: 400 TABLET, FILM COATED ORAL at 14:09

## 2023-10-22 RX ADMIN — PREGABALIN 75 MG: 75 CAPSULE ORAL at 21:05

## 2023-10-22 RX ADMIN — LISINOPRIL 2.5 MG: 2.5 TABLET ORAL at 08:43

## 2023-10-22 RX ADMIN — IBUPROFEN 400 MG: 400 TABLET, FILM COATED ORAL at 21:05

## 2023-10-22 RX ADMIN — BACLOFEN 10 MG: 10 TABLET ORAL at 08:30

## 2023-10-22 RX ADMIN — CHOLESTYRAMINE 1 PACKET: 4 POWDER, FOR SUSPENSION ORAL at 21:06

## 2023-10-22 RX ADMIN — HYDROCODONE BITARTRATE AND ACETAMINOPHEN 1 TABLET: 7.5; 325 TABLET ORAL at 13:03

## 2023-10-22 RX ADMIN — PREGABALIN 75 MG: 75 CAPSULE ORAL at 08:30

## 2023-10-22 RX ADMIN — INSULIN LISPRO 5 UNITS: 100 INJECTION, SOLUTION INTRAVENOUS; SUBCUTANEOUS at 17:05

## 2023-10-22 RX ADMIN — ATORVASTATIN CALCIUM 10 MG: 10 TABLET, FILM COATED ORAL at 21:05

## 2023-10-22 RX ADMIN — Medication 250 MG: at 08:30

## 2023-10-22 RX ADMIN — BACLOFEN 10 MG: 10 TABLET ORAL at 00:24

## 2023-10-22 RX ADMIN — BACITRACIN 0.9 G: 500 OINTMENT TOPICAL at 08:45

## 2023-10-22 RX ADMIN — INSULIN LISPRO 5 UNITS: 100 INJECTION, SOLUTION INTRAVENOUS; SUBCUTANEOUS at 11:53

## 2023-10-22 RX ADMIN — HYDROCODONE BITARTRATE AND ACETAMINOPHEN 1 TABLET: 7.5; 325 TABLET ORAL at 06:25

## 2023-10-22 RX ADMIN — Medication 250 MG: at 21:05

## 2023-10-22 RX ADMIN — IBUPROFEN 400 MG: 400 TABLET, FILM COATED ORAL at 05:54

## 2023-10-22 RX ADMIN — HYDROCODONE BITARTRATE AND ACETAMINOPHEN 1 TABLET: 7.5; 325 TABLET ORAL at 21:05

## 2023-10-22 RX ADMIN — CYANOCOBALAMIN TAB 500 MCG 1000 MCG: 500 TAB at 08:30

## 2023-10-22 RX ADMIN — INSULIN DETEMIR 45 UNITS: 100 INJECTION, SOLUTION SUBCUTANEOUS at 08:31

## 2023-10-22 RX ADMIN — INSULIN LISPRO 4 UNITS: 100 INJECTION, SOLUTION INTRAVENOUS; SUBCUTANEOUS at 11:53

## 2023-10-22 RX ADMIN — BACLOFEN 10 MG: 10 TABLET ORAL at 16:36

## 2023-10-22 RX ADMIN — EMPAGLIFLOZIN 10 MG: 10 TABLET, FILM COATED ORAL at 08:30

## 2023-10-22 RX ADMIN — FAMOTIDINE 40 MG: 20 TABLET, FILM COATED ORAL at 08:30

## 2023-10-22 RX ADMIN — PREGABALIN 75 MG: 75 CAPSULE ORAL at 16:36

## 2023-10-22 RX ADMIN — Medication: at 10:59

## 2023-10-22 RX ADMIN — APIXABAN 5 MG: 5 TABLET, FILM COATED ORAL at 08:30

## 2023-10-22 RX ADMIN — APIXABAN 5 MG: 5 TABLET, FILM COATED ORAL at 21:05

## 2023-10-22 RX ADMIN — CHOLESTYRAMINE 1 PACKET: 4 POWDER, FOR SUSPENSION ORAL at 08:30

## 2023-10-22 RX ADMIN — INSULIN LISPRO 5 UNITS: 100 INJECTION, SOLUTION INTRAVENOUS; SUBCUTANEOUS at 08:31

## 2023-10-22 RX ADMIN — MICONAZOLE NITRATE 1 APPLICATION: 2 POWDER TOPICAL at 08:35

## 2023-10-22 RX ADMIN — HYDROCODONE BITARTRATE AND ACETAMINOPHEN 1 TABLET: 7.5; 325 TABLET ORAL at 00:24

## 2023-10-22 RX ADMIN — MICONAZOLE NITRATE 1 APPLICATION: 2 POWDER TOPICAL at 21:07

## 2023-10-22 RX ADMIN — INSULIN LISPRO 4 UNITS: 100 INJECTION, SOLUTION INTRAVENOUS; SUBCUTANEOUS at 08:31

## 2023-10-22 RX ADMIN — BACITRACIN 0.9 G: 500 OINTMENT TOPICAL at 21:06

## 2023-10-22 NOTE — PLAN OF CARE
Goal Outcome Evaluation:  Plan of Care Reviewed With: patient        Progress: no change  Outcome Evaluation: patient alert and oriented x4. patient refused to be turned anf repositioned this shift. patient medicated per mar. skin and wound care done per orders. no new issues/needs at this time.

## 2023-10-22 NOTE — PROGRESS NOTES
[facility]   Hospitalist Progress Note  Date: 10/22/2023  Patient Name: Jose Shaikh  : 1960  MRN: [id number]  Date of admission: 9/10/2023      Subjective   Subjective   Chief complaint: Weakness    Summary:   62-year-old male initially hospitalized on 9/10/2023, prolonged hospitalization for treatment of management of generalized weakness, deconditioning, with acute issues of diarrhea, C. difficile negative.  Diarrhea improved.  Had small hematoma after ambulating on his foot.  Unfortunately with exhaustive efforts to try to place this gentleman after 39 days of hospitalization, we are unable to find a facility that will accept him.  He has no further acute needs or requirements for inpatient monitoring and management, his labs and vitals are stable, he is tolerating oral intake, and has been refusing turns and repositionings and other interventions with nursing staff despite recommendations.  Patient discharged in hemodynamically stable addition on 10/19/2023 to follow-up with PCP within 1 week.  Unfortunately we cannot solve all of his social issues during this hospitalization due to lack of resources and participation from the patient's perspective despite all our efforts.  Patient has a financial means of making arrangements at home.  Patient appealed his discharge.  Lost his appeal.  LCD: 10/20/23, PFL beginning: 10/21/23 @ 12pm.    Interval follow-up: Seen and examined, no acute distress, no acute major overnight events, excited to celebrate his birthday.  No diarrhea.  Still wants to stay in the hospital on his own accord.  Refusing to discharge, wants to contact his insurance carrier tomorrow.    Review of systems:  All systems reviewed negative except generalized fatigue      Objective   Objective     Vitals:   Temp:  [97.2 °F (36.2 °C)-98.1 °F (36.7 °C)] 97.5 °F (36.4 °C)  Heart Rate:  [76-81] 76  Resp:  [16-18] 18  BP: (106-124)/(50-80) 116/64  Physical Exam   Constitutional: Awake alert  "oriented no acute distress pleasant mood  Respiratory clear  Cardiovascular: RRR  GI: Abdomen soft nontender bowel sounds present   Extremities: Moving all 4 extremities with adequate strength    Result Review    Result Review:  I have personally reviewed the pertinent results from the past 24 hours to 10/22/2023 09:36 EDT and agree with these findings:  [x]  Laboratory   CBC          10/11/2023    06:05 10/15/2023    05:00 10/18/2023    04:56   CBC   WBC 5.58  6.30  4.43    RBC 4.54  4.19  3.77    Hemoglobin 11.6  10.6  9.7    Hematocrit 37.1  34.8  30.8    MCV 81.7  83.1  81.7    MCH 25.6  25.3  25.7    MCHC 31.3  30.5  31.5    RDW 17.4  18.1  17.0    Platelets 105  110  106      BMP          10/11/2023    06:05 10/15/2023    05:00 10/18/2023    04:56   BMP   BUN 17  18  15    Creatinine 0.52  0.48  0.62    Sodium 133  136  135    Potassium 4.3  3.9  4.1    Chloride 102  105  106    CO2 25.1  22.9  21.9    Calcium 8.9  8.6  8.3      LIVER FUNCTION TESTS:      Lab 10/18/23  0456   TOTAL PROTEIN 5.4*   ALBUMIN 2.8*   ALT (SGPT) 39   AST (SGOT) 32   BILIRUBIN 0.4   INDIRECT BILIRUBIN 0.2   BILIRUBIN DIRECT 0.2   ALK PHOS 223*       [x]  Microbiology No results found for: \"ACANTHNAEG\", \"AFBCX\", \"BPERTUSSISCX\", \"BLOODCX\"  No results found for: \"BCIDPCR\", \"CXREFLEX\", \"CSFCX\", \"CULTURETIS\"  No results found for: \"CULTURES\", \"HSVCX\", \"URCX\"  No results found for: \"EYECULTURE\", \"GCCX\", \"HSVCULTURE\", \"LABHSV\"  No results found for: \"LEGIONELLA\", \"MRSACX\", \"MUMPSCX\", \"MYCOPLASCX\"  No results found for: \"NOCARDIACX\", \"STOOLCX\"  No results found for: \"THROATCX\", \"UNSTIMCULT\", \"URINECX\", \"CULTURE\", \"VZVCULTUR\"  No results found for: \"VIRALCULTU\", \"WOUNDCX\"    [x]  Radiology [place] Guide For Pain Meds Inj    Result Date: 9/22/2023  PROCEDURE: [place] GUIDE FOR PAIN MEDS INJECTION MAJOR JOINT  COMPARISON: None  INDICATIONS: severe OA. LEFT HIP PAIN. INITIAL PAIN- 10/10.  POST INJECTION PAIN-  0/10.  CONSENT: Risks and benefits were " "discussed with the patient including but not limited to risk of bleeding, infection, allergic reaction/reaction to medications used and/or injury to adjacent structures. Alternatives were discussed with the patient. The patient verbalized understanding and elected to proceed with the procedure.  TECHNIQUE/FINDINGS: Prior to the start of the procedure, patient rated their pain as a 10/10. Patient was placed in supine positioning on the fluoro table and the left hip  was localized under fluoroscopy, site was marked with an \"X\".  The overlying skin was prepped and draped in sterile fashion.  The skin was infiltrated with local anesthesia over the insertion site with 12 mL of 2% lidocaine to anesthetize the site, aspiration occurred after needle advancement to minimize risk of inadvertently administering lidocaine into a vessel. A 22 gauge needle was then advanced to gain access into joint space.  Initially, A small amount of contrast dye injected to verify correct needle placement. Next, a steroid injection (1ml - Depo-Medrol 80 mg and 2ml - bupivacaine 0.75%) was injected into the joint space.  Afterwards, the needle was then removed and a bandage was applied. Post-procedure patient rated their pain level a 0/10. Limited spot fluoro images were obtained.       Successful steroid joint injection of the left hip was performed without complication, patient tolerated procedure.  The patient was instructed to obtain follow up care from the referring physician.     Exam directly supervised by Dr. Cole WEINER       Electronically Signed and Approved By: Slava Galvan M.D. on 9/22/2023 at 16:42               []  EKG/Telemetry   []  Cardiology/Vascular   []  Pathology  [x]  Old records  []  Other:    Assessment & Plan   Assessment / Plan     Assessment/Plan:  Assessment:  Generalized weakness  Deconditioning  Type 2 diabetes mellitus  Essential hypertension  Chronic paroxysmal atrial fibrillation on Eliquis  Obesity " (BMI: 42.88)  Debility with wheelchair dependence  Severe degenerative joint disease of lower extremities  Chronic wound  Thrombocytopenia     Plan:  Labs and imaging reviewed  Requesting chocolate cake for his birthday today, follow blood sugars after consumption of chocolate cake, may need additional Humalog dose  Nothing new going on, he is still working on re-appealing his discharge appeal  No acute new medical issues to be addressed  Continue blood sugar control with current regimen Levemir, Humalog  Patient awaiting appeal process for disposition  Patient can be discharged home at any time  Continue Florastor  Continue as needed Imodium and scheduled Questran  Continue other medications as ordered  PT/OT/ consulted and following    Discussed with RN    DVT prophylaxis:  Medical DVT prophylaxis orders are present.    CODE STATUS:   Code Status (Patient has no pulse and is not breathing): CPR (Attempt to Resuscitate)  Medical Interventions (Patient has pulse or is breathing): Full Support    Electronically signed by Cailin Acosta MD, 10/22/23, 9:38 AM EDT.  Portions of this documentation were transcribed electronically from a voice recognition software.  I confirm all data accurately represents the service(s) I performed at today's visit.

## 2023-10-22 NOTE — PLAN OF CARE
Goal Outcome Evaluation:  Plan of Care Reviewed With: patient        Progress: no change   Outcome Evaluation: Treated for pain & spasms per MAR. Wound and skin care completed. Blood glucose monitored. Otherwise, denies any needs this shift. None observed at this time. VS WDL.

## 2023-10-23 ENCOUNTER — TELEPHONE (OUTPATIENT)
Dept: WOUND CARE | Facility: HOSPITAL | Age: 63
End: 2023-10-23
Payer: MEDICARE

## 2023-10-23 LAB
GLUCOSE BLDC GLUCOMTR-MCNC: 161 MG/DL (ref 70–99)
GLUCOSE BLDC GLUCOMTR-MCNC: 228 MG/DL (ref 70–99)
GLUCOSE BLDC GLUCOMTR-MCNC: 231 MG/DL (ref 70–99)
GLUCOSE BLDC GLUCOMTR-MCNC: 242 MG/DL (ref 70–99)

## 2023-10-23 PROCEDURE — 63710000001 INSULIN DETEMIR PER 5 UNITS: Performed by: INTERNAL MEDICINE

## 2023-10-23 PROCEDURE — 63710000001 INSULIN LISPRO (HUMAN) PER 5 UNITS: Performed by: INTERNAL MEDICINE

## 2023-10-23 PROCEDURE — 82948 REAGENT STRIP/BLOOD GLUCOSE: CPT

## 2023-10-23 PROCEDURE — 99231 SBSQ HOSP IP/OBS SF/LOW 25: CPT | Performed by: FAMILY MEDICINE

## 2023-10-23 RX ADMIN — MICONAZOLE NITRATE 1 APPLICATION: 2 POWDER TOPICAL at 20:04

## 2023-10-23 RX ADMIN — Medication 250 MG: at 08:44

## 2023-10-23 RX ADMIN — BACLOFEN 10 MG: 10 TABLET ORAL at 06:29

## 2023-10-23 RX ADMIN — INSULIN LISPRO 5 UNITS: 100 INJECTION, SOLUTION INTRAVENOUS; SUBCUTANEOUS at 17:27

## 2023-10-23 RX ADMIN — INSULIN LISPRO 5 UNITS: 100 INJECTION, SOLUTION INTRAVENOUS; SUBCUTANEOUS at 13:03

## 2023-10-23 RX ADMIN — HYDROCODONE BITARTRATE AND ACETAMINOPHEN 1 TABLET: 7.5; 325 TABLET ORAL at 13:04

## 2023-10-23 RX ADMIN — PREGABALIN 75 MG: 75 CAPSULE ORAL at 20:06

## 2023-10-23 RX ADMIN — HYDROCODONE BITARTRATE AND ACETAMINOPHEN 1 TABLET: 7.5; 325 TABLET ORAL at 06:34

## 2023-10-23 RX ADMIN — IBUPROFEN 400 MG: 400 TABLET, FILM COATED ORAL at 08:47

## 2023-10-23 RX ADMIN — LISINOPRIL 2.5 MG: 2.5 TABLET ORAL at 08:45

## 2023-10-23 RX ADMIN — MICONAZOLE NITRATE 1 APPLICATION: 2 POWDER TOPICAL at 08:52

## 2023-10-23 RX ADMIN — INSULIN LISPRO 8 UNITS: 100 INJECTION, SOLUTION INTRAVENOUS; SUBCUTANEOUS at 20:05

## 2023-10-23 RX ADMIN — INSULIN LISPRO 5 UNITS: 100 INJECTION, SOLUTION INTRAVENOUS; SUBCUTANEOUS at 08:50

## 2023-10-23 RX ADMIN — HYDROCODONE BITARTRATE AND ACETAMINOPHEN 1 TABLET: 7.5; 325 TABLET ORAL at 20:05

## 2023-10-23 RX ADMIN — PREGABALIN 75 MG: 75 CAPSULE ORAL at 08:43

## 2023-10-23 RX ADMIN — BACITRACIN 0.9 G: 500 OINTMENT TOPICAL at 08:52

## 2023-10-23 RX ADMIN — APIXABAN 5 MG: 5 TABLET, FILM COATED ORAL at 08:43

## 2023-10-23 RX ADMIN — INSULIN DETEMIR 45 UNITS: 100 INJECTION, SOLUTION SUBCUTANEOUS at 20:05

## 2023-10-23 RX ADMIN — PREGABALIN 75 MG: 75 CAPSULE ORAL at 16:54

## 2023-10-23 RX ADMIN — Medication 250 MG: at 20:05

## 2023-10-23 RX ADMIN — INSULIN LISPRO 8 UNITS: 100 INJECTION, SOLUTION INTRAVENOUS; SUBCUTANEOUS at 17:27

## 2023-10-23 RX ADMIN — FAMOTIDINE 40 MG: 20 TABLET, FILM COATED ORAL at 08:43

## 2023-10-23 RX ADMIN — BACITRACIN 0.9 G: 500 OINTMENT TOPICAL at 20:05

## 2023-10-23 RX ADMIN — APIXABAN 5 MG: 5 TABLET, FILM COATED ORAL at 20:05

## 2023-10-23 RX ADMIN — CHOLESTYRAMINE 1 PACKET: 4 POWDER, FOR SUSPENSION ORAL at 20:05

## 2023-10-23 RX ADMIN — BACLOFEN 10 MG: 10 TABLET ORAL at 20:12

## 2023-10-23 RX ADMIN — INSULIN DETEMIR 45 UNITS: 100 INJECTION, SOLUTION SUBCUTANEOUS at 08:43

## 2023-10-23 RX ADMIN — EMPAGLIFLOZIN 10 MG: 10 TABLET, FILM COATED ORAL at 08:45

## 2023-10-23 RX ADMIN — Medication: at 13:05

## 2023-10-23 RX ADMIN — ATORVASTATIN CALCIUM 10 MG: 10 TABLET, FILM COATED ORAL at 20:06

## 2023-10-23 RX ADMIN — INSULIN LISPRO 4 UNITS: 100 INJECTION, SOLUTION INTRAVENOUS; SUBCUTANEOUS at 13:03

## 2023-10-23 RX ADMIN — CYANOCOBALAMIN TAB 500 MCG 1000 MCG: 500 TAB at 08:44

## 2023-10-23 RX ADMIN — INSULIN LISPRO 8 UNITS: 100 INJECTION, SOLUTION INTRAVENOUS; SUBCUTANEOUS at 08:43

## 2023-10-23 RX ADMIN — CHOLESTYRAMINE 1 PACKET: 4 POWDER, FOR SUSPENSION ORAL at 08:45

## 2023-10-23 NOTE — SIGNIFICANT NOTE
10/23/23 1300   Coping/Psychosocial   Observed Emotional State calm;cooperative   Verbalized Emotional State relief;hopefulness   Trust Relationship/Rapport empathic listening provided   Involvement in Care interacting with patient   Additional Documentation Spiritual Care (Group)   Spiritual Care   Use of Spiritual Resources non-Protestant use of spiritual care   Spiritual Care Source  initiative   Spiritual Care Follow-Up follow-up, none required as presently assessed   Response to Spiritual Care receptive of support   Spiritual Care Interventions supportive conversation provided   Spiritual Care Visit Type initial   Receptivity to Spiritual Care visit welcomed

## 2023-10-23 NOTE — PROGRESS NOTES
[facility]   Hospitalist Progress Note  Date: 10/23/2023  Patient Name: Jose Shaikh  : 1960  MRN: [id number]  Date of admission: 9/10/2023      Subjective   Subjective       Chief complaint: Weakness    Summary:   62-year-old male initially hospitalized on 9/10/2023, prolonged hospitalization for treatment of management of generalized weakness, deconditioning, with acute issues of diarrhea, C. difficile negative.  Diarrhea improved.  Had small hematoma after ambulating on his foot.  Unfortunately with exhaustive efforts to try to place this gentleman after 39 days of hospitalization, we are unable to find a facility that will accept him.  He has no further acute needs or requirements for inpatient monitoring and management, his labs and vitals are stable, he is tolerating oral intake, and has been refusing turns and repositionings and other interventions with nursing staff despite recommendations.  Patient discharged in hemodynamically stable addition on 10/19/2023 to follow-up with PCP within 1 week.  Unfortunately we cannot solve all of his social issues during this hospitalization due to lack of resources and participation from the patient's perspective despite all our efforts.  Patient has a financial means of making arrangements at home.  Patient appealed his discharge.  Lost his appeal.  LCD: 10/20/23, PFL beginning: 10/21/23 @ 12pm.    Interval follow-up: Seen and examined, no acute distress, no acute major overnight events, was not happy about his birthday yesterday, was provided only cheesecake.  Is still questioning his appeal, said that there is a nursing facility that has started the precertification process but his financials have come into question.  Refused to be turned and repositioned yesterday.  Blood sugars are adequately controlled in the 100-200 range.    Review of systems:  All systems reviewed negative except generalized fatigue    Objective   Objective     Vitals:   Temp:   "[97.5 °F (36.4 °C)-98.4 °F (36.9 °C)] 97.7 °F (36.5 °C)  Heart Rate:  [56-90] 88  Resp:  [18] 18  BP: (108-134)/(54-92) 134/65  Physical Exam   Constitutional: Awake alert oriented no acute distress eating breakfast  Respiratory clear  Cardiovascular: RRR  GI: Abdomen soft nontender bowel sounds present   Extremities: Moving all 4 extremities with adequate strength    Result Review    Result Review:  I have personally reviewed the pertinent results from the past 24 hours to 10/23/2023 10:08 EDT and agree with these findings:  [x]  Laboratory   CBC          10/11/2023    06:05 10/15/2023    05:00 10/18/2023    04:56   CBC   WBC 5.58  6.30  4.43    RBC 4.54  4.19  3.77    Hemoglobin 11.6  10.6  9.7    Hematocrit 37.1  34.8  30.8    MCV 81.7  83.1  81.7    MCH 25.6  25.3  25.7    MCHC 31.3  30.5  31.5    RDW 17.4  18.1  17.0    Platelets 105  110  106      BMP          10/11/2023    06:05 10/15/2023    05:00 10/18/2023    04:56   BMP   BUN 17  18  15    Creatinine 0.52  0.48  0.62    Sodium 133  136  135    Potassium 4.3  3.9  4.1    Chloride 102  105  106    CO2 25.1  22.9  21.9    Calcium 8.9  8.6  8.3      LIVER FUNCTION TESTS:      Lab 10/18/23  0456   TOTAL PROTEIN 5.4*   ALBUMIN 2.8*   ALT (SGPT) 39   AST (SGOT) 32   BILIRUBIN 0.4   INDIRECT BILIRUBIN 0.2   BILIRUBIN DIRECT 0.2   ALK PHOS 223*       [x]  Microbiology No results found for: \"ACANTHNAEG\", \"AFBCX\", \"BPERTUSSISCX\", \"BLOODCX\"  No results found for: \"BCIDPCR\", \"CXREFLEX\", \"CSFCX\", \"CULTURETIS\"  No results found for: \"CULTURES\", \"HSVCX\", \"URCX\"  No results found for: \"EYECULTURE\", \"GCCX\", \"HSVCULTURE\", \"LABHSV\"  No results found for: \"LEGIONELLA\", \"MRSACX\", \"MUMPSCX\", \"MYCOPLASCX\"  No results found for: \"NOCARDIACX\", \"STOOLCX\"  No results found for: \"THROATCX\", \"UNSTIMCULT\", \"URINECX\", \"CULTURE\", \"VZVCULTUR\"  No results found for: \"VIRALCULTU\", \"WOUNDCX\"    [x]  Radiology [place] Guide For Pain Meds Inj    Result Date: 9/22/2023  PROCEDURE: [place] GUIDE FOR PAIN " "MEDS INJECTION MAJOR JOINT  COMPARISON: None  INDICATIONS: severe OA. LEFT HIP PAIN. INITIAL PAIN- 10/10.  POST INJECTION PAIN-  0/10.  CONSENT: Risks and benefits were discussed with the patient including but not limited to risk of bleeding, infection, allergic reaction/reaction to medications used and/or injury to adjacent structures. Alternatives were discussed with the patient. The patient verbalized understanding and elected to proceed with the procedure.  TECHNIQUE/FINDINGS: Prior to the start of the procedure, patient rated their pain as a 10/10. Patient was placed in supine positioning on the fluoro table and the left hip  was localized under fluoroscopy, site was marked with an \"X\".  The overlying skin was prepped and draped in sterile fashion.  The skin was infiltrated with local anesthesia over the insertion site with 12 mL of 2% lidocaine to anesthetize the site, aspiration occurred after needle advancement to minimize risk of inadvertently administering lidocaine into a vessel. A 22 gauge needle was then advanced to gain access into joint space.  Initially, A small amount of contrast dye injected to verify correct needle placement. Next, a steroid injection (1ml - Depo-Medrol 80 mg and 2ml - bupivacaine 0.75%) was injected into the joint space.  Afterwards, the needle was then removed and a bandage was applied. Post-procedure patient rated their pain level a 0/10. Limited spot fluoro images were obtained.       Successful steroid joint injection of the left hip was performed without complication, patient tolerated procedure.  The patient was instructed to obtain follow up care from the referring physician.     Exam directly supervised by Dr. Cole WEINER       Electronically Signed and Approved By: Slava Galvan M.D. on 9/22/2023 at 16:42               []  EKG/Telemetry   []  Cardiology/Vascular   []  Pathology  [x]  Old records  []  Other:    Assessment & Plan   Assessment / Plan "     Assessment/Plan:  Assessment:  Generalized weakness  Deconditioning  Type 2 diabetes mellitus  Essential hypertension  Chronic paroxysmal atrial fibrillation on Eliquis  Obesity (BMI: 42.88)  Debility with wheelchair dependence  Severe degenerative joint disease of lower extremities  Chronic wound  Thrombocytopenia     Plan:  Labs and imaging reviewed  Patient was discharged initially on 10/19/2023, patient appealed discharge, lost appeal, initial discharge order was canceled by accident and reentered on 10/21/2023, patient remains discharged as of 10/19/2023 but refuses to leave.  PFL beginning 10/21/2023 at 12 PM.  Continue to work with  to determine next steps  Encourage patient to mobilize with therapists and staff  Continue blood sugar control with current regimen Levemir, Humalog  Patient can be discharged home at any time; discharge order remains  Continue Florastor  Continue as needed Imodium and scheduled Questran  Continue other medications as ordered  PT/OT/ consulted and following    Discussed with RN    DVT prophylaxis:  Medical DVT prophylaxis orders are present.    CODE STATUS:   Code Status (Patient has no pulse and is not breathing): CPR (Attempt to Resuscitate)  Medical Interventions (Patient has pulse or is breathing): Full Support    Electronically signed by Cailin Acosta MD, 10/23/23, 10:11 AM EDT.  Portions of this documentation were transcribed electronically from a voice recognition software.  I confirm all data accurately represents the service(s) I performed at today's visit.

## 2023-10-23 NOTE — TELEPHONE ENCOUNTER
PATIENT IS CURRENTLY IN THE HOSPITAL, BUT WOULD LIKE A LETTER FROM DR. MAYER MAILED TO HIS HOME STATING IT IS OK FOR HIM TO PUT WEIGHT ON HIS RIGHT FOOT. HE IS CURRENTLY NON-WEIGHTBEARING AND WOULD LIKE THAT LIFTED SO THAT HE CAN GET THERAPY STARTED TO COME TO HIS HOUSE ONCE HE IS OUT OF THE HOSPITAL.

## 2023-10-24 LAB
GLUCOSE BLDC GLUCOMTR-MCNC: 163 MG/DL (ref 70–99)
GLUCOSE BLDC GLUCOMTR-MCNC: 175 MG/DL (ref 70–99)
GLUCOSE BLDC GLUCOMTR-MCNC: 254 MG/DL (ref 70–99)

## 2023-10-24 PROCEDURE — 63710000001 INSULIN DETEMIR PER 5 UNITS: Performed by: FAMILY MEDICINE

## 2023-10-24 PROCEDURE — 63710000001 INSULIN DETEMIR PER 5 UNITS: Performed by: INTERNAL MEDICINE

## 2023-10-24 PROCEDURE — 97164 PT RE-EVAL EST PLAN CARE: CPT

## 2023-10-24 PROCEDURE — 63710000001 INSULIN LISPRO (HUMAN) PER 5 UNITS: Performed by: INTERNAL MEDICINE

## 2023-10-24 PROCEDURE — 99231 SBSQ HOSP IP/OBS SF/LOW 25: CPT | Performed by: FAMILY MEDICINE

## 2023-10-24 PROCEDURE — 82948 REAGENT STRIP/BLOOD GLUCOSE: CPT

## 2023-10-24 RX ADMIN — MICONAZOLE NITRATE 1 APPLICATION: 2 POWDER TOPICAL at 12:38

## 2023-10-24 RX ADMIN — INSULIN LISPRO 5 UNITS: 100 INJECTION, SOLUTION INTRAVENOUS; SUBCUTANEOUS at 12:39

## 2023-10-24 RX ADMIN — INSULIN DETEMIR 50 UNITS: 100 INJECTION, SOLUTION SUBCUTANEOUS at 20:56

## 2023-10-24 RX ADMIN — INSULIN LISPRO 4 UNITS: 100 INJECTION, SOLUTION INTRAVENOUS; SUBCUTANEOUS at 12:38

## 2023-10-24 RX ADMIN — HYDROCODONE BITARTRATE AND ACETAMINOPHEN 1 TABLET: 7.5; 325 TABLET ORAL at 20:52

## 2023-10-24 RX ADMIN — INSULIN DETEMIR 45 UNITS: 100 INJECTION, SOLUTION SUBCUTANEOUS at 09:10

## 2023-10-24 RX ADMIN — PREGABALIN 75 MG: 75 CAPSULE ORAL at 16:07

## 2023-10-24 RX ADMIN — LISINOPRIL 2.5 MG: 2.5 TABLET ORAL at 09:12

## 2023-10-24 RX ADMIN — SIMETHICONE 80 MG: 80 TABLET, CHEWABLE ORAL at 17:21

## 2023-10-24 RX ADMIN — MICONAZOLE NITRATE 1 APPLICATION: 2 POWDER TOPICAL at 20:53

## 2023-10-24 RX ADMIN — HYDROCODONE BITARTRATE AND ACETAMINOPHEN 1 TABLET: 7.5; 325 TABLET ORAL at 02:12

## 2023-10-24 RX ADMIN — HYDROCODONE BITARTRATE AND ACETAMINOPHEN 1 TABLET: 7.5; 325 TABLET ORAL at 09:11

## 2023-10-24 RX ADMIN — INSULIN LISPRO 12 UNITS: 100 INJECTION, SOLUTION INTRAVENOUS; SUBCUTANEOUS at 17:21

## 2023-10-24 RX ADMIN — ATORVASTATIN CALCIUM 10 MG: 10 TABLET, FILM COATED ORAL at 20:52

## 2023-10-24 RX ADMIN — BACITRACIN 0.9 G: 500 OINTMENT TOPICAL at 20:53

## 2023-10-24 RX ADMIN — Medication: at 12:38

## 2023-10-24 RX ADMIN — INSULIN LISPRO 5 UNITS: 100 INJECTION, SOLUTION INTRAVENOUS; SUBCUTANEOUS at 17:21

## 2023-10-24 RX ADMIN — Medication 250 MG: at 09:12

## 2023-10-24 RX ADMIN — BACLOFEN 10 MG: 10 TABLET ORAL at 20:52

## 2023-10-24 RX ADMIN — PREGABALIN 75 MG: 75 CAPSULE ORAL at 20:52

## 2023-10-24 RX ADMIN — EMPAGLIFLOZIN 10 MG: 10 TABLET, FILM COATED ORAL at 09:11

## 2023-10-24 RX ADMIN — APIXABAN 5 MG: 5 TABLET, FILM COATED ORAL at 09:11

## 2023-10-24 RX ADMIN — PREGABALIN 75 MG: 75 CAPSULE ORAL at 09:11

## 2023-10-24 RX ADMIN — BACLOFEN 10 MG: 10 TABLET ORAL at 12:39

## 2023-10-24 RX ADMIN — IBUPROFEN 400 MG: 400 TABLET, FILM COATED ORAL at 17:22

## 2023-10-24 RX ADMIN — INSULIN LISPRO 4 UNITS: 100 INJECTION, SOLUTION INTRAVENOUS; SUBCUTANEOUS at 09:10

## 2023-10-24 RX ADMIN — BACITRACIN 0.9 G: 500 OINTMENT TOPICAL at 12:38

## 2023-10-24 RX ADMIN — APIXABAN 5 MG: 5 TABLET, FILM COATED ORAL at 20:53

## 2023-10-24 RX ADMIN — CYANOCOBALAMIN TAB 500 MCG 1000 MCG: 500 TAB at 09:11

## 2023-10-24 RX ADMIN — FAMOTIDINE 40 MG: 20 TABLET, FILM COATED ORAL at 09:11

## 2023-10-24 RX ADMIN — INSULIN LISPRO 5 UNITS: 100 INJECTION, SOLUTION INTRAVENOUS; SUBCUTANEOUS at 09:10

## 2023-10-24 RX ADMIN — BACLOFEN 10 MG: 10 TABLET ORAL at 05:24

## 2023-10-24 RX ADMIN — Medication 250 MG: at 20:52

## 2023-10-24 RX ADMIN — INSULIN LISPRO 4 UNITS: 100 INJECTION, SOLUTION INTRAVENOUS; SUBCUTANEOUS at 20:53

## 2023-10-24 RX ADMIN — CHOLESTYRAMINE 1 PACKET: 4 POWDER, FOR SUSPENSION ORAL at 09:11

## 2023-10-24 NOTE — CONSULTS
" [facility]   PSYCHIATRIC CONSULTATION    Patient Name: Jose Shaikh  : 1960  MRN: [id number]  Primary Care Physician:  Delia Cervantes, VALENTÍN  Date of admission: 9/10/2023    Referring Provider: Dr Acosta  Reason for Consultation: \"establish ability to make medical decisions\"    Subjective   Subjective     Chief Complaint: \"I was told you might be stopping by.\"    HPI:     Jose Shaikh is a 63 y.o. male admitted to medical floor. He presented to ED via EMS 9/10/23 with generalized weakness, deconditioning and diarrhea. He has history of DM with polyneuropathy s/p amputation of foot. He is morbidly obese. Placement has been unsuccessful per primary team. He has no acute issues per medical provider and has been refusing turns and repositioning as well as other interventions despite recommendations. He was scheduled to be discharged and appeared d/c but lost appeal. Primary team has consulted psychiatry to assess decisionality.    Patient is pleasant and cooperative and states that he does not want to be discharged home \"because I know I can't take care of myself.\" States his brother was thought to be option but he states his brother is unable to care for him. He states that he wants to discharge to a \"rehab\" facility. Reports that he feels he needs continued physical therapy. States he is unable to ambulate on own. Reports that he has refused turns and treatment but states he has an understanding of his medical conditions and the risks of not complying with treatment recommendations. He states this is why he does not want to be discharged as he knows he will not be able to care for himself and feels he would fall. He states \"they tried to discharge me and I couldn't get out of the car and they had to call an ambulance\" in referencing a previous discharge prior to his current hospitalization. He states that he is not depressed or anxious. He utilizes humor throughout the assessment. He is alert and " "oriented to his full name, name of hospital, city, state, month and year. He is able to state current and past presidents and describes the events that led to hospitalzation. He is able to describe medications and identifies his diagnoses. He demonstrates in tact insight and judgement as evidenced by his understanding of his diagnoses and the treatments offered. He is aware that his doctors feel he does not meet criteria for continued hospitalization and reports he disagrees. He states \"I want to be discharged, I just know I'm not ready, I will fall. I need rehab or something.\"     He denies manic symptoms, symptoms of psychosis, anger, irritability, aggression. Reports appetite is good. Sleep is good. Reports that he is future oriented and identifies support. States that he is hopeful for the future. Denies access to firearms. He is pleasant and cooperative.     Denies drugs alcohol and tobacco in past several years. \"I dont have money for the stuff.\" States he was imprisoned in past for a sodomy charge and states \"basically it was someone who had something against me. It didn't really happen.\" He identifies being in [facility] as a trauma and states he has constant anxiety related to his time in [facility] but does not identify a specific traumatic event that occurred when he was incarcerated. He denies feeling anxious at present. Denies nightmares or flashbacks.        Review of Systems   All systems were reviewed and negative except for: chronic pain, gait instability       Personal History     Past Medical History:   Diagnosis Date    Absence of toe of right foot     Acute osteomyelitis of left calcaneus  8/18/2021    Anxiety and depression     Arthritis     Claustrophobia     Corns and callus     Diabetic ulcer of left heel associated with type 2 DM 8/18/2021    Diabetic ulcer of left heel associated with type 2 DM 7/6/2021    Diabetic ulcer of right midfoot associated with type 2 DM 8/18/2021    Difficulty walking  "    Essential hypertension 2021    Hammertoe     Hyperlipidemia LDL goal <100 2021    Ingrown toenail     Obesity     Paroxysmal atrial fibrillation 2021    Polyneuropathy     Pressure ulcer, stage 1     Tinea unguium     Type 2 diabetes mellitus with polyneuropathy        Past Surgical History:   Procedure Laterality Date    CYST REMOVAL      center of back; benign    INCISION AND DRAINAGE ABSCESS      back    INCISION AND DRAINAGE LEG Right 12/10/2021    Procedure: INCISION AND DRAINAGE LOWER EXTREMITY;  Surgeon: Ash Leyva DPM;  Location: [facility] MAIN OR;  Service: Podiatry;  Laterality: Right;    OTHER SURGICAL HISTORY      Surgical clips left foot    TOE SURGERY Right     Removal of 5th toe    TRANS METATARSAL AMPUTATION Right 2021    Procedure: AMPUTATION TRANS METATARSAL;  Surgeon: Ash Leyva DPM;  Location: [facility] MAIN OR;  Service: Podiatry;  Laterality: Right;    WRIST SURGERY Left     repair of injury       Past Psychiatric History: Denies previous inpatient psychiatric admissions. Denies suicide attempts. States has been on wellbutrin in past for depression but states it was ineffective.         Family History: family history includes Cancer in his father; Heart disease in his brother, father, and mother. Otherwise pertinent FHx was reviewed and not pertinent to current issue.    Social History:     Social History     Socioeconomic History    Marital status:    Tobacco Use    Smoking status: Former     Packs/day: 0.00     Years: 1.00     Additional pack years: 0.00     Total pack years: 0.00     Types: Cigarettes     Quit date: 1991     Years since quittin.1    Smokeless tobacco: Never    Tobacco comments:     quit at age 30   Vaping Use    Vaping Use: Never used   Substance and Sexual Activity    Alcohol use: Yes     Comment: rare    Drug use: Yes     Types: Marijuana     Comment: occasionally    Sexual activity: Defer     Reports he has no  "Latter day preference. Reports no  experience. Reports that he has his own apartment in [place]. States he lives alone. 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 States that he is . Reports he has 2 children, states he talks to them occasionally. Reports that he was born in [place] and raised in [place]. Reports that he does not have history of physical, sexual or emotional abuse. Reports past time in jail for sodomy charge. Reports that he has no current or pending charges.    Substance Abuse History:Quit smoking 32 years ago per primary team. He denies drugs alcohol and tobacco to this writer \"for years\" but per chart review drinks alcohol and uses marijuana prior to hospitalization.     Home Medications:  HYDROcodone-acetaminophen, Insulin Lispro (1 Unit Dial), acetaminophen, apixaban, digoxin, empagliflozin, famotidine, insulin detemir, lisinopril, naloxone, pregabalin, and simvastatin      Allergies:  Allergies   Allergen Reactions    Adhesive Tape Rash       Objective   Objective     Vitals:   Temp:  [97.3 °F (36.3 °C)-98.2 °F (36.8 °C)] 97.9 °F (36.6 °C)  Heart Rate:  [82-88] 85  Resp:  [18] 18  BP: (117-147)/(43-72) 119/61  Physical Exam       Mental Status Exam:     Hygiene:   fair  Cooperation:  cooperative   Eye Contact:  Good  Psychomotor Behavior:  Appropriate  Mood: \"ok\"  Affect:  pleasant, normal range  Speech:  normal rate, normal tone, normal volume  Language: english, fluent   Thought Process:linear, goal directed  Thought Content:  denies si/hi/avh, no delusional content noted   Suicidal:  denies   Homicidal:  denies   Hallucinations:  denies   Delusion:  none noted  Memory:  in tact to past events and recent events   Orientation:  person, place, time , situation  Reliability:  fair during assessment   Insight: fair during assessment  Judgement:  fair during assessment   Impulse Control:  fair during assessment     Result Review    Result " Review:  I have personally reviewed the results from the time of this admission to 10/24/2023 15:54 EDT and agree with these findings:  [x]  Laboratory  []  Microbiology  []  Radiology  []  EKG/Telemetry   []  Cardiology/Vascular   []  Pathology  []  Old records  []  Other:    Assessment & Plan   Assessment / Plan       Active Hospital Problems:  Active Hospital Problems    Diagnosis     **Generalized weakness     Type 2 diabetes mellitus, with long-term current use of insulin     Class 3 severe obesity due to excess calories with serious comorbidity and body mass index (BMI) of 45.0 to 49.9 in adult     Dependence on wheelchair     Foot pain, bilateral     Paroxysmal atrial fibrillation     Essential hypertension     Diabetic ulcer of left heel associated with type 2 DM          Recommendations:    Patient denies si/hi/avh. He reports future orientation, identifies he has a support system, identifies reasons to live,denies hopelessness. Denies depression or anxiety. Denies access to firearms. He denies interest in pharmacalogical psychaitric treatment and does not endorse current symptoms.       He is declining psychiatric interventions and does not endorse psychiatric symptoms. He does not meet criteria for legal hold.     Patient is alert and oriented to person place time and situation. Memory in tact. He verbalizes understanding of medical conditions, treatments, and risk of not accepting treatment or refusing treatment as well as benefits of treatment. He demonstrates in tact insight and judgement. Based on my evaluation it is this writers assessment that patient has capacity to make medical decisions. He demonstrates understanding of his decisions.          Part of this note may be an electronic transcription/translation of spoken language to printed text using the Dragon Dictation System.    Electronically signed by Brayan Littlejohn MD, 10/24/23, 3:54 PM EDT.

## 2023-10-24 NOTE — PLAN OF CARE
Goal Outcome Evaluation:  Plan of Care Reviewed With: patient        Progress: no change  Outcome Evaluation: rested in bed throughout the day. frequent complaints of pain in hip. Medicated per MAR. Wound care performed per order. Multiple incontinent stools. Seen by hospitalist.

## 2023-10-24 NOTE — THERAPY RE-EVALUATION
Acute Care - Physical Therapy Re-Evaluation  KEO Dominguez     Patient Name: Jose Shaikh  : 1960  MRN: [id number]  Today's Date: 10/24/2023      Visit Dx:     ICD-10-CM ICD-9-CM   1. Generalized weakness  R53.1 780.79   2. Hyponatremia  E87.1 276.1   3. Difficulty in walking  R26.2 719.7   4. Decreased activities of daily living (ADL)  Z78.9 V49.89   5. Difficulty walking  R26.2 719.7     Patient Active Problem List   Diagnosis    Diabetic ulcer of left heel associated with type 2 DM    Acute osteomyelitis of left calcaneus     Diabetic ulcer of left heel associated with type 2 DM    Diabetic ulcer of right midfoot associated with type 2 DM    Paroxysmal atrial fibrillation    Essential hypertension    Hyperlipidemia LDL goal <100    Cellulitis and abscess of foot    High alkaline phosphatase level    Osteomyelitis    Onychomycosis    Onychocryptosis    Foot pain, bilateral    Osteomyelitis of foot, right, acute    Cellulitis of right foot    Type 2 diabetes mellitus, with long-term current use of insulin    Class 3 severe obesity due to excess calories with serious comorbidity and body mass index (BMI) of 45.0 to 49.9 in adult    Anxiety disorder, unspecified    Claustrophobia    Dependence on wheelchair    Depression, unspecified    Long term (current) use of anticoagulants    Long term (current) use of oral hypoglycemic drugs    Wound of foot    Non-prs chronic ulcer oth prt r foot limited to brkdwn skin    Orthostatic hypotension    Other chronic osteomyelitis, right ankle and foot    Personal history of nicotine dependence    Thrombocytopenia, unspecified    Unspecified open wound, right foot, initial encounter    Diabetic foot infection    Subacute osteomyelitis of right foot    Right foot pain    Sepsis    Onychomycosis    Foot pain, left    Impaired mobility and ADLs    Absence of toe of right foot    Corns and callosity    Disability of walking    Fracture    Limb swelling    Polyneuropathy     Pressure ulcer, stage 1    Shortness of breath    Generalized weakness     Past Medical History:   Diagnosis Date    Absence of toe of right foot     Acute osteomyelitis of left calcaneus  8/18/2021    Anxiety and depression     Arthritis     Claustrophobia     Corns and callus     Diabetic ulcer of left heel associated with type 2 DM 8/18/2021    Diabetic ulcer of left heel associated with type 2 DM 7/6/2021    Diabetic ulcer of right midfoot associated with type 2 DM 8/18/2021    Difficulty walking     Essential hypertension 8/31/2021    Hammertoe     Hyperlipidemia LDL goal <100 8/31/2021    Ingrown toenail     Obesity     Paroxysmal atrial fibrillation 8/31/2021    Polyneuropathy     Pressure ulcer, stage 1     Tinea unguium     Type 2 diabetes mellitus with polyneuropathy      Past Surgical History:   Procedure Laterality Date    CYST REMOVAL      center of back; benign    INCISION AND DRAINAGE ABSCESS      back    INCISION AND DRAINAGE LEG Right 12/10/2021    Procedure: INCISION AND DRAINAGE LOWER EXTREMITY;  Surgeon: Ash Leyva DPM;  Location: [facility];  Service: Podiatry;  Laterality: Right;    OTHER SURGICAL HISTORY      Surgical clips left foot    TOE SURGERY Right     Removal of 5th toe    TRANS METATARSAL AMPUTATION Right 12/2/2021    Procedure: AMPUTATION TRANS METATARSAL;  Surgeon: Ash Leyva DPM;  Location: [facility];  Service: Podiatry;  Laterality: Right;    WRIST SURGERY Left     repair of injury     PT Assessment (last 12 hours)       PT Evaluation and Treatment       Row Name 10/24/23 1156          Physical Therapy Time and Intention    Subjective Information complains of;fatigue;pain (P)   -ZT     Document Type re-evaluation (P)   -ZT     Mode of Treatment individual therapy;physical therapy (P)   -ZT     Patient Effort good (P)   -ZT       Row Name 10/24/23 1157          General Information    Patient Profile Reviewed yes (P)   -ZT     Patient Observations  alert;cooperative;agree to therapy (P)   -ZT     Prior Level of Function independent:;all household mobility;ADL's (P)   -ZT     Equipment Currently Used at Home wheelchair;walker, rolling (P)   -ZT     Existing Precautions/Restrictions fall (P)   -ZT       Row Name 10/24/23 1150          Living Environment    Current Living Arrangements extended care facility (P)   -ZT     Home Accessibility wheelchair accessible (P)   -ZT     People in Home alone (P)   -ZT     Primary Care Provided by self;other (see comments) (P)   -ZT       Row Name 10/24/23 1150          Range of Motion (ROM)    Range of Motion bilateral lower extremities;ROM is WFL (P)   -ZT       Row Name 10/24/23 1150          Strength (Manual Muscle Testing)    Strength (Manual Muscle Testing) bilateral lower extremities;other (see comments) (P)   BLE hip flexion 4+/5, BLE knee extension 4/5, BLE knee flexion 5/5, L DF 5/5  -ZT       Row Name 10/24/23 1150          Bed Mobility    Bed Mobility bed mobility (all) activities (P)   -ZT     All Activities, [place] (Bed Mobility) moderate assist (50% patient effort) (P)   -ZT       Row Name 10/24/23 1150          Transfers    Transfers other (see comments) (P)   Pt declined  -ZT       Row Name 10/24/23 1150          Gait/Stairs (Locomotion)    Gait/Stairs Locomotion other (see comments) (P)   Pt declined  -ZT       Row Name 10/24/23 1150          Safety Issues, Functional Mobility    Impairments Affecting Function (Mobility) balance;endurance/activity tolerance;strength (P)   -ZT       Row Name 10/24/23 1150          Balance    Balance Assessment sitting static balance (P)   -ZT     Static Sitting Balance independent (P)   -ZT     Position, Sitting Balance sitting edge of bed (P)   -ZT       Row Name             Wound 12/02/21 Right anterior foot Incision    Wound - Properties Group Placement Date: 12/02/21  -ES Side: Right  -ES Orientation: anterior  -ES Location: foot  -ES Primary Wound Type: Incision   -ES    Retired Wound - Properties Group Placement Date: 12/02/21  -ES Side: Right  -ES Orientation: anterior  -ES Location: foot  -ES Primary Wound Type: Incision  -ES    Retired Wound - Properties Group Date first assessed: 12/02/21  -ES Side: Right  -ES Location: foot  -ES Primary Wound Type: Incision  -ES      Row Name             Wound 10/17/23 0546 Bilateral perirectal MASD (Moisture associated skin damage)    Wound - Properties Group Placement Date: 10/17/23  -AS Placement Time: 0546  -AS Present on Original Admission: N  -AS Side: Bilateral  -AS Location: perirectal  -AS Primary Wound Type: MASD  -AS    Retired Wound - Properties Group Placement Date: 10/17/23  -AS Placement Time: 0546  -AS Present on Original Admission: N  -AS Side: Bilateral  -AS Location: perirectal  -AS Primary Wound Type: MASD  -AS    Retired Wound - Properties Group Date first assessed: 10/17/23  -AS Time first assessed: 0546  -AS Present on Original Admission: N  -AS Side: Bilateral  -AS Location: perirectal  -AS Primary Wound Type: MASD  -AS      Row Name 10/24/23 1150          Plan of Care Review    Plan of Care Reviewed With patient (P)   -ZT     Outcome Evaluation Pt presents in a deconditioned state secondary to recent hospital stay. He requires assistance to move around in bed and cannot tolerate much movement at all due to BLE pain and decreased mobility. He continues to require skilled PT services to address these deficits to improve his independence. (P)   -ZT       Row Name 10/24/23 1150          Positioning and Restraints    Pre-Treatment Position in bed (P)   -ZT     Post Treatment Position bed (P)   -ZT     In Bed call light within reach;encouraged to call for assist;exit alarm on (P)   -ZT       Row Name 10/24/23 5043          Therapy Assessment/Plan (PT)    Rehab Potential (PT) fair, will monitor progress closely (P)   -ZT     Criteria for Skilled Interventions Met (PT) yes;skilled treatment is necessary (P)   -ZT      Therapy Frequency (PT) 3 times/wk (P)   -ZT     Predicted Duration of Therapy Intervention (PT) 10 days (P)   -ZT     Problem List (PT) problems related to;balance;mobility;strength (P)   -ZT     Activity Limitations Related to Problem List (PT) unable to ambulate safely;unable to transfer safely (P)   -ZT       Row Name 10/24/23 1150          Progress Summary (PT)    Progress Toward Functional Goals (PT) progress toward functional goals is fair (P)   -ZT       Row Name 10/24/23 1150          Therapy Plan Review/Discharge Plan (PT)    Therapy Plan Review (PT) evaluation/treatment results reviewed;care plan/treatment goals reviewed;participants included;patient (P)   -ZT       Row Name 10/24/23 1150          Physical Therapy Goals    Bed Mobility Goal Selection (PT) bed mobility, PT goal 1 (P)   -ZT     Transfer Goal Selection (PT) transfer, PT goal 1 (P)   -ZT     Gait Training Goal Selection (PT) gait training, PT goal 1 (P)   -ZT     Strength Goal Selection (PT) strength, PT goal 1 (P)   -ZT       Row Name 10/24/23 1150          Bed Mobility Goal 1 (PT)    Activity/Assistive Device (Bed Mobility Goal 1, PT) bed mobility activities, all (P)   -ZT     [place] Level/Cues Needed (Bed Mobility Goal 1, PT) independent (P)   -ZT     Time Frame (Bed Mobility Goal 1, PT) 10 days (P)   -ZT     Progress/Outcomes (Bed Mobility Goal 1, PT) goal not met;continuing progress toward goal (P)   -ZT       Row Name 10/24/23 1150          Transfer Goal 1 (PT)    Activity/Assistive Device (Transfer Goal 1, PT) transfers, all (P)   -ZT     [place] Level/Cues Needed (Transfer Goal 1, PT) independent (P)   -ZT     Time Frame (Transfer Goal 1, PT) 10 days (P)   -ZT     Progress/Outcome (Transfer Goal 1, PT) goal not met;continuing progress toward goal (P)   -ZT       Row Name 10/24/23 1150          Gait Training Goal 1 (PT)    Activity/Assistive Device (Gait Training Goal 1, PT) gait (walking locomotion);assistive device use (P)    -ZT     [place] Level (Gait Training Goal 1, PT) independent (P)   -ZT     Distance (Gait Training Goal 1, PT) 2 (P)   -ZT     Time Frame (Gait Training Goal 1, PT) 10 days (P)   -ZT     Progress/Outcome (Gait Training Goal 1, PT) goal not met;continuing progress toward goal (P)   -ZT       Row Name 10/24/23 1150          Strength Goal 1 (PT)    Strength Goal 1 (PT) Patient will demonstrate improvement in gross bilateral lower extremity strength to 3+/5 (P)   -ZT     Time Frame (Strength Goal 1, PT) 10 days (P)   -ZT     Progress/Outcome (Strength Goal 1, PT) goal met (P)   -ZT               User Key  (r) = Recorded By, (t) = Taken By, (c) = Cosigned By      Initials Name Provider Type    AS Angelina Alfredo, RN Registered Nurse    Katlyn Garcia RN Registered Nurse    Oscar Ward, PT Student PT Student                    Physical Therapy Education       Title: PT OT SLP Therapies (Done)       Topic: Physical Therapy (Done)       Point: Mobility training (Done)       Learning Progress Summary             Patient Acceptance, E,TB, VU by ZT at 10/24/2023 1157    Acceptance, E, VU,NR by RASHARD at 9/24/2023 0604    Acceptance, E, VU,DU by  at 9/18/2023 1537    Acceptance, E, VU,DU by RF at 9/17/2023 1515    Acceptance, E, VU,DU by  at 9/16/2023 1804    Acceptance, E, VU by  at 9/13/2023 1059    Acceptance, E,TB, VU by  at 9/12/2023 1308                         Point: Home exercise program (Done)       Learning Progress Summary             Patient Acceptance, E, VU,NR by RASHARD at 9/24/2023 0604    Acceptance, E, VU,DU by  at 9/18/2023 1537    Acceptance, E, VU,DU by RF at 9/17/2023 1515    Acceptance, E, VU,DU by RF at 9/16/2023 1804    Acceptance, E, VU by AV at 9/13/2023 1059    Acceptance, E,TB, VU by  at 9/12/2023 1308                         Point: Body mechanics (Done)       Learning Progress Summary             Patient Acceptance, E,TB, VU by ZT at 10/24/2023 1157    Acceptance, EJONN,NR by RASHARD  at 9/24/2023 0604    Acceptance, E, VU,DU by RF at 9/18/2023 1537    Acceptance, E, VU,DU by RF at 9/17/2023 1515    Acceptance, E, VU,DU by RF at 9/16/2023 1804    Acceptance, E, VU by AV at 9/13/2023 1059    Acceptance, E,TB, VU by  at 9/12/2023 1308                         Point: Precautions (Done)       Learning Progress Summary             Patient Acceptance, E,TB, VU by ZT at 10/24/2023 1157    Acceptance, E, VU,NR by RASHARD at 9/24/2023 0604    Acceptance, E, VU,DU by RF at 9/18/2023 1537    Acceptance, E, VU,DU by RF at 9/17/2023 1515    Acceptance, E, VU,DU by RF at 9/16/2023 1804    Acceptance, E, VU by AV at 9/13/2023 1059    Acceptance, E,TB, VU by  at 9/12/2023 1308                                         User Key       Initials Effective Dates Name Provider Type Discipline    RASHARD 06/16/21 -  Selena Lindquist, RN Registered Nurse Nurse    AV 06/16/21 -  Uvaldo Christine OT Occupational Therapist OT     01/19/22 -  Karl Baker, RN Registered Nurse Nurse     08/16/23 -  Gamal Simmons, PT Student PT Student PT     09/05/23 -  Oscar Shields, PT Student PT Student PT                  PT Recommendation and Plan  Anticipated Discharge Disposition (PT): (P) inpatient rehabilitation facility  Planned Therapy Interventions (PT): (P) balance training, bed mobility training, gait training, stair training, strengthening, transfer training  Therapy Frequency (PT): (P) 3 times/wk  Progress Summary (PT)  Progress Toward Functional Goals (PT): (P) progress toward functional goals is fair  Daily Progress Summary (PT): Pt presents in a deconditioned state secondary to recent hospital stay. He has LE pain that makes it difficult for him to move in bed. He can only tolerate small amounts of exercise before he requires a break. He continues to require skilled PT services to address these deficits so that he can safely perform ADL's.  Plan of Care Reviewed With: (P) patient  Outcome Evaluation: (P) Pt presents  in a deconditioned state secondary to recent hospital stay. He requires assistance to move around in bed and cannot tolerate much movement at all due to BLE pain and decreased mobility. He continues to require skilled PT services to address these deficits to improve his independence.   Outcome Measures       Row Name 10/24/23 1100             How much help from another person do you currently need...    Turning from your back to your side while in flat bed without using bedrails? 3 (P)   -ZT      Moving from lying on back to sitting on the side of a flat bed without bedrails? 3 (P)   -ZT      Moving to and from a bed to a chair (including a wheelchair)? 2 (P)   -ZT      Standing up from a chair using your arms (e.g., wheelchair, bedside chair)? 2 (P)   -ZT      Climbing 3-5 steps with a railing? 1 (P)   -ZT      To walk in hospital room? 1 (P)   -ZT      AM-PAC 6 Clicks Score (PT) 12 (P)   -ZT      Highest level of mobility 4 --> Transferred to chair/commode (P)   -ZT                User Key  (r) = Recorded By, (t) = Taken By, (c) = Cosigned By      Initials Name Provider Type    ZT Oscar Shields, PT Student PT Student                     Time Calculation:    PT Charges       Row Name 10/24/23 1150             Time Calculation    PT Received On 10/24/23 (P)   -ZT      PT Goal Re-Cert Due Date 11/02/23 (P)   -ZT         Untimed Charges    PT Eval/Re-eval Minutes 25 (P)   -ZT         Total Minutes    Untimed Charges Total Minutes 25 (P)   -ZT       Total Minutes 25 (P)   -ZT                User Key  (r) = Recorded By, (t) = Taken By, (c) = Cosigned By      Initials Name Provider Type    ZT Oscar Shields, PT Student PT Student                      PT G-Codes  Outcome Measure Options: AM-PAC 6 Clicks Daily Activity (OT), Optimal Instrument  AM-PAC 6 Clicks Score (PT): (P) 12  AM-PAC 6 Clicks Score (OT): 15    Oscar Shields PT Student  10/24/2023

## 2023-10-24 NOTE — PROGRESS NOTES
[facility]   Hospitalist Progress Note  Date: 10/24/2023  Patient Name: Jose Shaikh  : 1960  MRN: [id number]  Date of admission: 9/10/2023      Subjective   Subjective     Chief Complaint: Follow-up weakness    Summary:Jose Shaikh is a 63 y.o. male  initially hospitalized on 9/10/2023, prolonged hospitalization for treatment of management of generalized weakness, deconditioning, with acute issues of diarrhea, C. difficile negative.  Diarrhea improved.  Had small hematoma after ambulating on his foot.  Unfortunately with exhaustive efforts to try to place this gentleman after 39 days of hospitalization, we are unable to find a facility that will accept him.  He has no further acute needs or requirements for inpatient monitoring and management, his labs and vitals are stable, he is tolerating oral intake, and has been refusing turns and repositionings and other interventions with nursing staff despite recommendations.  Patient discharged in hemodynamically stable addition on 10/19/2023 to follow-up with PCP within 1 week.  Unfortunately we cannot solve all of his social issues during this hospitalization due to lack of resources and participation from the patient's perspective despite all our efforts.  Patient has a financial means of making arrangements at home.  Patient appealed his discharge.  Lost his appeal.  LCD: 10/20/23, PFL beginning: 10/21/23 @ 12pm.  Discharge planning continues to work on inpatient placement.    Interval Followup: Patient seen this afternoon and appeared to be resting comfortably.  No new issues per staff.  Still awaiting inpatient placement.  Discharge planning.    Review of Systems  Constitutional: Negative for fatigue and fever.   HENT: Negative for sore throat and trouble swallowing.    Eyes: Negative for pain and discharge.   Respiratory: Negative for cough and shortness of breath.    Cardiovascular: Negative for chest pain and palpitations.   Gastrointestinal:  Negative for abdominal pain, nausea and vomiting.   Endocrine: Negative for cold intolerance and heat intolerance.   Genitourinary: Negative for difficulty urinating and dysuria.   Musculoskeletal: Negative for back pain and neck stiffness.   Skin: Negative for color change and rash.   Neurological: Negative for syncope and headaches.   Hematological: Negative for adenopathy.   Psychiatric/Behavioral: Negative for confusion and hallucinations.    Objective   Objective     Vitals:   Temp:  [97.3 °F (36.3 °C)-98.2 °F (36.8 °C)] 97.5 °F (36.4 °C)  Heart Rate:  [82-88] 86  Resp:  [18] 18  BP: (117-147)/(43-72) 122/52  Physical Exam   General: well-developed appearing stated age obese in no acute distress  HEENT: Normocephalic atraumatic moist membranes   Pulmonary:symmetric chest expansion, unlabored  Gastrointestinal:  nondistended   Skin: Clean dry   Neuro: grossly no sensorimotor deficits appreciated bilateral upper and lower extremities  Psych: Patient is calm not responding to internal stimuli  : No King catheter     Result Review    Result Review:  I have personally reviewed these results and agree with these findings:  [x]  Laboratory  LAB RESULTS:      Lab 10/18/23  0456   WBC 4.43   HEMOGLOBIN 9.7*   HEMATOCRIT 30.8*   PLATELETS 106*   NEUTROS ABS 2.56   IMMATURE GRANS (ABS) 0.01   LYMPHS ABS 1.27   MONOS ABS 0.49   EOS ABS 0.08   MCV 81.7         Lab 10/18/23  0456   SODIUM 135*   POTASSIUM 4.1   CHLORIDE 106   CO2 21.9*   ANION GAP 7.1   BUN 15   CREATININE 0.62*   EGFR 106.7   GLUCOSE 212*   CALCIUM 8.3*   MAGNESIUM 1.7   PHOSPHORUS 3.9         Lab 10/18/23  0456   TOTAL PROTEIN 5.4*   ALBUMIN 2.8*   ALT (SGPT) 39   AST (SGOT) 32   BILIRUBIN 0.4   INDIRECT BILIRUBIN 0.2   BILIRUBIN DIRECT 0.2   ALK PHOS 223*                     Brief Urine Lab Results  (Last result in the past 365 days)        Color   Clarity   Blood   Leuk Est   Nitrite   Protein   CREAT   Urine HCG        09/11/23 2200 Dark Yellow    Clear   Negative   Negative   Negative   Negative                 Microbiology Results (last 10 days)       Procedure Component Value - Date/Time    Enteric Bacterial Panel - Stool, Per Rectum [[id number]]  (Normal) Collected: 10/17/23 2026    Lab Status: Final result Specimen: Stool from Per Rectum Updated: 10/18/23 0856     Salmonella Not Detected     Campylobacter Not Detected     Shigella/Enteroinvasive E. coli (EIEC) Not Detected     Shiga-like toxin-producing E. coli (STEC) stx1/stx2 Not Detected    Clostridioides difficile Toxin - Stool, Per Rectum [[id number]] Collected: 10/17/23 0144    Lab Status: Final result Specimen: Stool from Per Rectum Updated: 10/17/23 0248    Narrative:      The following orders were created for panel order Clostridioides difficile Toxin - Stool, Per Rectum.  Procedure                               Abnormality         Status                     ---------                               -----------         ------                     Clostridioides difficile...[[id number]]                      Final result                 Please view results for these tests on the individual orders.    Clostridioides difficile Toxin, PCR - Stool, Per Rectum [[id number]] Collected: 10/17/23 0144    Lab Status: Final result Specimen: Stool from Per Rectum Updated: 10/17/23 0248     Toxigenic C. difficile by PCR Negative     027 Toxin Presumptive Negative    Narrative:      The result indicates the absence of toxigenic C. difficile from stool specimen.             [x]  Microbiology  []  Radiology  No radiology results for the last 7 days    []  EKG/Telemetry   []  Cardiology/Vascular   []  Pathology  []  Old records  [x]  Other:  Scheduled Meds:ammonium lactate, , Topical, Daily  apixaban, 5 mg, Oral, Q12H  atorvastatin, 10 mg, Oral, Nightly  bacitracin, 1 application , Topical, BID  cholestyramine, 1 packet, Oral, Q12H  empagliflozin, 10 mg, Oral, Daily  famotidine, 40 mg, Oral, Daily  insulin detemir, 50  Units, Subcutaneous, Q12H  insulin lispro, 4-24 Units, Subcutaneous, 4x Daily AC & at Bedtime  Insulin Lispro, 5 Units, Subcutaneous, TID With Meals  lisinopril, 2.5 mg, Oral, Q24H  miconazole, 1 application , Topical, Q12H  pregabalin, 75 mg, Oral, TID  saccharomyces boulardii, 250 mg, Oral, BID  sodium chloride, 10 mL, Intravenous, Q12H  vitamin B-12, 1,000 mcg, Oral, Daily      Continuous Infusions:   PRN Meds:.  acetaminophen    baclofen    senna-docusate sodium **AND** polyethylene glycol **AND** bisacodyl **AND** bisacodyl    dextrose    dextrose    diphenoxylate-atropine    glucagon (human recombinant)    HYDROcodone-acetaminophen    hydrocortisone-bacitracin-zinc oxide-nystatin    ibuprofen    ondansetron    ondansetron ODT    simethicone    sodium chloride    sodium chloride      Assessment & Plan   Assessment / Plan     Assessment/Plan:  Assessment:  Generalized weakness  Deconditioning  Type 2 diabetes mellitus  Essential hypertension  Chronic paroxysmal atrial fibrillation on Eliquis  Obesity (BMI: 42.88)  Debility with wheelchair dependence  Severe degenerative joint disease of lower extremities  Chronic wound  Thrombocytopenia     Plan:  Labs and imaging reviewed  Patient was discharged initially on 10/19/2023, patient appealed discharge, lost appeal, initial discharge order was canceled by accident and reentered on 10/21/2023, patient remains discharged as of 10/19/2023 but refuses to leave.  PFL beginning 10/21/2023 at 12 PM.  Continue to work with  to determine next steps  Encourage patient to mobilize with therapists and staff  Continue blood sugar control with current regimen Levemir, Humalog and titrate as needed  Patient can be discharged home at any time; discharge order remains  Continue Florastor  Continue as needed Imodium and scheduled Questran  Continue other medications as ordered  PT/OT/ consulted and following         Discussed plan with bedside RN.    DVT  prophylaxis:  Medical DVT prophylaxis orders are present.    CODE STATUS:   Code Status (Patient has no pulse and is not breathing): CPR (Attempt to Resuscitate)  Medical Interventions (Patient has pulse or is breathing): Full Support

## 2023-10-24 NOTE — PLAN OF CARE
Goal Outcome Evaluation:   Patient a&o x4. Frequently requests pain meds and repositioning, medicated for complaints per MAR. Wound care completed as ordered. Blood glucose monitored as ordered, supplemental insulin provided per MAR.

## 2023-10-24 NOTE — PLAN OF CARE
Goal Outcome Evaluation:  Plan of Care Reviewed With: (P) patient           Outcome Evaluation: (P) Pt presents in a deconditioned state secondary to recent hospital stay. He requires assistance to move around in bed and cannot tolerate much movement at all due to BLE pain and decreased mobility. He continues to require skilled PT services to address these deficits to improve his independence.      Anticipated Discharge Disposition (PT): (P) inpatient rehabilitation facility

## 2023-10-25 LAB
GLUCOSE BLDC GLUCOMTR-MCNC: 150 MG/DL (ref 70–99)
GLUCOSE BLDC GLUCOMTR-MCNC: 151 MG/DL (ref 70–99)
GLUCOSE BLDC GLUCOMTR-MCNC: 207 MG/DL (ref 70–99)
GLUCOSE BLDC GLUCOMTR-MCNC: 226 MG/DL (ref 70–99)
GLUCOSE BLDC GLUCOMTR-MCNC: 230 MG/DL (ref 70–99)

## 2023-10-25 PROCEDURE — 82948 REAGENT STRIP/BLOOD GLUCOSE: CPT

## 2023-10-25 PROCEDURE — 99231 SBSQ HOSP IP/OBS SF/LOW 25: CPT | Performed by: FAMILY MEDICINE

## 2023-10-25 PROCEDURE — 63710000001 INSULIN DETEMIR PER 5 UNITS: Performed by: FAMILY MEDICINE

## 2023-10-25 PROCEDURE — 63710000001 INSULIN LISPRO (HUMAN) PER 5 UNITS: Performed by: INTERNAL MEDICINE

## 2023-10-25 RX ADMIN — LISINOPRIL 2.5 MG: 2.5 TABLET ORAL at 08:40

## 2023-10-25 RX ADMIN — INSULIN LISPRO 4 UNITS: 100 INJECTION, SOLUTION INTRAVENOUS; SUBCUTANEOUS at 08:39

## 2023-10-25 RX ADMIN — APIXABAN 5 MG: 5 TABLET, FILM COATED ORAL at 20:26

## 2023-10-25 RX ADMIN — HYDROCODONE BITARTRATE AND ACETAMINOPHEN 1 TABLET: 7.5; 325 TABLET ORAL at 04:28

## 2023-10-25 RX ADMIN — Medication 250 MG: at 20:26

## 2023-10-25 RX ADMIN — DIPHENOXYLATE HYDROCHLORIDE AND ATROPINE SULFATE 1 TABLET: 2.5; .025 TABLET ORAL at 20:36

## 2023-10-25 RX ADMIN — APIXABAN 5 MG: 5 TABLET, FILM COATED ORAL at 08:40

## 2023-10-25 RX ADMIN — INSULIN LISPRO 5 UNITS: 100 INJECTION, SOLUTION INTRAVENOUS; SUBCUTANEOUS at 17:33

## 2023-10-25 RX ADMIN — Medication 250 MG: at 08:40

## 2023-10-25 RX ADMIN — IBUPROFEN 400 MG: 400 TABLET, FILM COATED ORAL at 21:46

## 2023-10-25 RX ADMIN — DIPHENOXYLATE HYDROCHLORIDE AND ATROPINE SULFATE 1 TABLET: 2.5; .025 TABLET ORAL at 08:40

## 2023-10-25 RX ADMIN — INSULIN LISPRO 5 UNITS: 100 INJECTION, SOLUTION INTRAVENOUS; SUBCUTANEOUS at 08:40

## 2023-10-25 RX ADMIN — INSULIN LISPRO 8 UNITS: 100 INJECTION, SOLUTION INTRAVENOUS; SUBCUTANEOUS at 17:32

## 2023-10-25 RX ADMIN — EMPAGLIFLOZIN 10 MG: 10 TABLET, FILM COATED ORAL at 08:40

## 2023-10-25 RX ADMIN — CYANOCOBALAMIN TAB 500 MCG 1000 MCG: 500 TAB at 08:40

## 2023-10-25 RX ADMIN — BACLOFEN 10 MG: 10 TABLET ORAL at 06:25

## 2023-10-25 RX ADMIN — IBUPROFEN 400 MG: 400 TABLET, FILM COATED ORAL at 02:34

## 2023-10-25 RX ADMIN — Medication: at 09:39

## 2023-10-25 RX ADMIN — HYDROCODONE BITARTRATE AND ACETAMINOPHEN 1 TABLET: 7.5; 325 TABLET ORAL at 20:26

## 2023-10-25 RX ADMIN — PREGABALIN 75 MG: 75 CAPSULE ORAL at 08:40

## 2023-10-25 RX ADMIN — ATORVASTATIN CALCIUM 10 MG: 10 TABLET, FILM COATED ORAL at 20:26

## 2023-10-25 RX ADMIN — INSULIN LISPRO 8 UNITS: 100 INJECTION, SOLUTION INTRAVENOUS; SUBCUTANEOUS at 20:26

## 2023-10-25 RX ADMIN — PREGABALIN 75 MG: 75 CAPSULE ORAL at 15:23

## 2023-10-25 RX ADMIN — INSULIN DETEMIR 50 UNITS: 100 INJECTION, SOLUTION SUBCUTANEOUS at 08:39

## 2023-10-25 RX ADMIN — CHOLESTYRAMINE 1 PACKET: 4 POWDER, FOR SUSPENSION ORAL at 20:26

## 2023-10-25 RX ADMIN — MICONAZOLE NITRATE 1 APPLICATION: 2 POWDER TOPICAL at 08:40

## 2023-10-25 RX ADMIN — FAMOTIDINE 40 MG: 20 TABLET, FILM COATED ORAL at 08:40

## 2023-10-25 RX ADMIN — INSULIN LISPRO 5 UNITS: 100 INJECTION, SOLUTION INTRAVENOUS; SUBCUTANEOUS at 13:06

## 2023-10-25 RX ADMIN — IBUPROFEN 400 MG: 400 TABLET, FILM COATED ORAL at 09:39

## 2023-10-25 RX ADMIN — BACITRACIN 0.9 G: 500 OINTMENT TOPICAL at 20:27

## 2023-10-25 RX ADMIN — BACITRACIN 0.9 G: 500 OINTMENT TOPICAL at 08:41

## 2023-10-25 RX ADMIN — BACLOFEN 10 MG: 10 TABLET ORAL at 16:47

## 2023-10-25 RX ADMIN — HYDROCODONE BITARTRATE AND ACETAMINOPHEN 1 TABLET: 7.5; 325 TABLET ORAL at 13:06

## 2023-10-25 RX ADMIN — PREGABALIN 75 MG: 75 CAPSULE ORAL at 20:26

## 2023-10-25 RX ADMIN — INSULIN DETEMIR 50 UNITS: 100 INJECTION, SOLUTION SUBCUTANEOUS at 20:26

## 2023-10-25 RX ADMIN — INSULIN LISPRO 8 UNITS: 100 INJECTION, SOLUTION INTRAVENOUS; SUBCUTANEOUS at 13:06

## 2023-10-25 RX ADMIN — CHOLESTYRAMINE 1 PACKET: 4 POWDER, FOR SUSPENSION ORAL at 08:40

## 2023-10-25 NOTE — SIGNIFICANT NOTE
Wound Eval / Progress Noted    KEO Dominguez     Patient Name: Jose Shaikh  : 1960  MRN: [id number]  Today's Date: 10/25/2023                 Admit Date: 9/10/2023    Visit Dx:    ICD-10-CM ICD-9-CM   1. Generalized weakness  R53.1 780.79   2. Hyponatremia  E87.1 276.1   3. Difficulty in walking  R26.2 719.7   4. Decreased activities of daily living (ADL)  Z78.9 V49.89   5. Difficulty walking  R26.2 719.7         Generalized weakness    Diabetic ulcer of left heel associated with type 2 DM    Paroxysmal atrial fibrillation    Essential hypertension    Foot pain, bilateral    Type 2 diabetes mellitus, with long-term current use of insulin    Class 3 severe obesity due to excess calories with serious comorbidity and body mass index (BMI) of 45.0 to 49.9 in adult    Dependence on wheelchair        Past Medical History:   Diagnosis Date    Absence of toe of right foot     Acute osteomyelitis of left calcaneus  2021    Anxiety and depression     Arthritis     Claustrophobia     Corns and callus     Diabetic ulcer of left heel associated with type 2 DM 2021    Diabetic ulcer of left heel associated with type 2 DM 2021    Diabetic ulcer of right midfoot associated with type 2 DM 2021    Difficulty walking     Essential hypertension 2021    Hammertoe     Hyperlipidemia LDL goal <100 2021    Ingrown toenail     Obesity     Paroxysmal atrial fibrillation 2021    Polyneuropathy     Pressure ulcer, stage 1     Tinea unguium     Type 2 diabetes mellitus with polyneuropathy         Past Surgical History:   Procedure Laterality Date    CYST REMOVAL      center of back; benign    INCISION AND DRAINAGE ABSCESS      back    INCISION AND DRAINAGE LEG Right 12/10/2021    Procedure: INCISION AND DRAINAGE LOWER EXTREMITY;  Surgeon: Ash Leyva DPM;  Location: [facility] MAIN OR;  Service: Podiatry;  Laterality: Right;    OTHER SURGICAL HISTORY      Surgical clips left foot    TOE SURGERY  Right     Removal of 5th toe    TRANS METATARSAL AMPUTATION Right 12/2/2021    Procedure: AMPUTATION TRANS METATARSAL;  Surgeon: Ash Leyva DPM;  Location: [facility] MAIN OR;  Service: Podiatry;  Laterality: Right;    WRIST SURGERY Left     repair of injury         Physical Assessment:     10/25/23 1625   Wound 12/02/21 Right anterior foot Incision   Placement Date: 12/02/21   Side: Right  Orientation: anterior  Location: foot  Primary Wound Type: Incision   Wound Image    Dressing Appearance open to air   Closure None   Base red;purple;dry   Periwound intact;pink   Periwound Temperature warm   Periwound Skin Turgor soft   Edges rolled/closed   Wound Length (cm) 4 cm   Wound Width (cm) 2 cm   Wound Surface Area (cm^2) 8 cm^2   Drainage Amount none   Wound 10/17/23 0546 Bilateral perirectal MASD (Moisture associated skin damage)   Placement Date/Time: 10/17/23 0546   Present on Original Admission: No  Side: Bilateral  Location: perirectal  Primary Wound Type: MASD (Moisture associated skin damage)   Dressing Appearance open to air   Base moist;red   Periwound blanchable;redness   Periwound Temperature warm   Periwound Skin Turgor soft   Edges rolled/closed                Wound Check / Follow-up:  Patient seen today for wound consult and wound follow-up.   Patient with redness and moisture to yonis-rectal tissue. Currently with barriers in use. There are no open wounds at present time. Will recommending continued use of barrier ointments.   Patient with traumatic injury to right anterior foot. The area is dry with red and purple discoloration. There is no drainage. Recommending to continue topical treatments.   Moisture within abdominal fold. Recommending moisture prevention.   There is a very thickened yellow callus and crusting formation to left heel within an area of indention. No open tissue noted.     Impression: Moisture and redness within gluteal crease. Traumatic injury to right foot    Short term  goals:  Regain skin integrity. Topical treatments. Skin protection, moisture prevention, pressure reduction.     Bettye Crews RN    10/25/2023    17:22 EDT

## 2023-10-25 NOTE — PROGRESS NOTES
[facility]   Hospitalist Progress Note  Date: 10/25/2023  Patient Name: Jose Shaikh  : 1960  MRN: [id number]  Date of admission: 9/10/2023      Subjective   Subjective     Chief Complaint: Follow-up weakness    Summary:Jose Shaikh is a 63 y.o. male  initially hospitalized on 9/10/2023, prolonged hospitalization for treatment of management of generalized weakness, deconditioning, with acute issues of diarrhea, C. difficile negative.  Diarrhea improved.  Had small hematoma after ambulating on his foot.  Unfortunately with exhaustive efforts to try to place this gentleman after 39 days of hospitalization, we are unable to find a facility that will accept him.  He has no further acute needs or requirements for inpatient monitoring and management, his labs and vitals are stable, he is tolerating oral intake, and has been refusing turns and repositionings and other interventions with nursing staff despite recommendations.  Patient discharged in hemodynamically stable addition on 10/19/2023 to follow-up with PCP within 1 week.  Unfortunately we cannot solve all of his social issues during this hospitalization due to lack of resources and participation from the patient's perspective despite all our efforts.  Patient has a financial means of making arrangements at home.  Patient appealed his discharge.  Lost his appeal.  LCD: 10/20/23, PFL beginning: 10/21/23 @ 12pm.  Discharge planning continues to work on inpatient rehab placement.    Interval Followup: Patient seen this afternoon and appeared to be resting comfortably.  Biggest complaint is his lunch tray. No new issues per staff.  Still awaiting inpatient rehab placement.  Discharge planning.    Review of Systems  Constitutional: Negative for fatigue and fever.   HENT: Negative for sore throat and trouble swallowing.    Eyes: Negative for pain and discharge.   Respiratory: Negative for cough and shortness of breath.    Cardiovascular: Negative for chest  pain and palpitations.   Gastrointestinal: Negative for abdominal pain, nausea and vomiting.   Endocrine: Negative for cold intolerance and heat intolerance.   Genitourinary: Negative for difficulty urinating and dysuria.   Musculoskeletal: Negative for back pain and neck stiffness.   Skin: Negative for color change and rash.   Neurological: Negative for syncope and headaches.   Hematological: Negative for adenopathy.   Psychiatric/Behavioral: Negative for confusion and hallucinations.    Objective   Objective     Vitals:   Temp:  [97.6 °F (36.4 °C)-98.2 °F (36.8 °C)] 98.1 °F (36.7 °C)  Heart Rate:  [78-92] 86  Resp:  [18-20] 18  BP: ()/(44-63) 108/63  Physical Exam   General: well-developed appearing stated age obese in no acute distress  HEENT: Normocephalic atraumatic moist membranes   Pulmonary:symmetric chest expansion, unlabored  Gastrointestinal:  nondistended   Skin: Clean dry   Neuro: grossly no sensorimotor deficits appreciated bilateral upper and lower extremities  Psych: Patient is calm not responding to internal stimuli  : No King catheter     Result Review    Result Review:  I have personally reviewed these results and agree with these findings:  [x]  Laboratory  LAB RESULTS:                                    Brief Urine Lab Results  (Last result in the past 365 days)        Color   Clarity   Blood   Leuk Est   Nitrite   Protein   CREAT   Urine HCG        09/11/23 2200 Dark Yellow   Clear   Negative   Negative   Negative   Negative                 Microbiology Results (last 10 days)       Procedure Component Value - Date/Time    Enteric Bacterial Panel - Stool, Per Rectum [[id number]]  (Normal) Collected: 10/17/23 2026    Lab Status: Final result Specimen: Stool from Per Rectum Updated: 10/18/23 0856     Salmonella Not Detected     Campylobacter Not Detected     Shigella/Enteroinvasive E. coli (EIEC) Not Detected     Shiga-like toxin-producing E. coli (STEC) stx1/stx2 Not Detected     Clostridioides difficile Toxin - Stool, Per Rectum [[id number]] Collected: 10/17/23 0144    Lab Status: Final result Specimen: Stool from Per Rectum Updated: 10/17/23 0248    Narrative:      The following orders were created for panel order Clostridioides difficile Toxin - Stool, Per Rectum.  Procedure                               Abnormality         Status                     ---------                               -----------         ------                     Clostridioides difficile...[[id number]]                      Final result                 Please view results for these tests on the individual orders.    Clostridioides difficile Toxin, PCR - Stool, Per Rectum [[id number]] Collected: 10/17/23 0144    Lab Status: Final result Specimen: Stool from Per Rectum Updated: 10/17/23 0248     Toxigenic C. difficile by PCR Negative     027 Toxin Presumptive Negative    Narrative:      The result indicates the absence of toxigenic C. difficile from stool specimen.             [x]  Microbiology  []  Radiology  No radiology results for the last 7 days    []  EKG/Telemetry   []  Cardiology/Vascular   []  Pathology  []  Old records  [x]  Other:  Scheduled Meds:ammonium lactate, , Topical, Daily  apixaban, 5 mg, Oral, Q12H  atorvastatin, 10 mg, Oral, Nightly  bacitracin, 1 application , Topical, BID  cholestyramine, 1 packet, Oral, Q12H  empagliflozin, 10 mg, Oral, Daily  famotidine, 40 mg, Oral, Daily  insulin detemir, 50 Units, Subcutaneous, Q12H  insulin lispro, 4-24 Units, Subcutaneous, 4x Daily AC & at Bedtime  Insulin Lispro, 5 Units, Subcutaneous, TID With Meals  lisinopril, 2.5 mg, Oral, Q24H  pregabalin, 75 mg, Oral, TID  saccharomyces boulardii, 250 mg, Oral, BID  sodium chloride, 10 mL, Intravenous, Q12H  vitamin B-12, 1,000 mcg, Oral, Daily      Continuous Infusions:   PRN Meds:.  acetaminophen    baclofen    senna-docusate sodium **AND** polyethylene glycol **AND** bisacodyl **AND** bisacodyl    dextrose     dextrose    diphenoxylate-atropine    glucagon (human recombinant)    HYDROcodone-acetaminophen    hydrocortisone-bacitracin-zinc oxide-nystatin    ibuprofen    ondansetron    ondansetron ODT    simethicone    sodium chloride    sodium chloride      Assessment & Plan   Assessment / Plan     Assessment/Plan:  Assessment:  Generalized weakness  Deconditioning  Type 2 diabetes mellitus  Essential hypertension  Chronic paroxysmal atrial fibrillation on Eliquis  Obesity (BMI: 42.88)  Debility with wheelchair dependence  Severe degenerative joint disease of lower extremities  Chronic wound  Thrombocytopenia     Plan:  Labs and imaging reviewed  Patient was discharged initially on 10/19/2023, patient appealed discharge, lost appeal, initial discharge order was canceled by accident and reentered on 10/21/2023, patient remains discharged as of 10/19/2023 but refuses to leave.  PFL beginning 10/21/2023 at 12 PM.  Continue to work with  to determine next steps  Encourage patient to mobilize with therapists and staff  Continue blood sugar control with current regimen Levemir, Humalog and titrate as needed  Patient can be discharged home at any time; discharge order remains  Continue Florastor  Continue as needed Imodium and scheduled Questran  Continue other medications as ordered  PT/OT/ consulted and following         Discussed plan with bedside RN and discharge planning.     DVT prophylaxis:  Medical DVT prophylaxis orders are present.    CODE STATUS:   Code Status (Patient has no pulse and is not breathing): CPR (Attempt to Resuscitate)  Medical Interventions (Patient has pulse or is breathing): Full Support

## 2023-10-25 NOTE — PROGRESS NOTES
" [facility]     Psychiatric Progress Note    Patient Name: Jose Shaikh  : 1960  MRN: [id number]  Primary Care Physician:  Delia Cervantes, APRN  Date of admission: 9/10/2023    Subjective   Subjective     Chief Complaint: \"Im ok.\"    HPI:     Patient seen and chart reviewed, discussed with staff.  Patient denies depression or anxiety but states at times he gets depressed due to frustration with his physical health. States \"why would I go home\" \"I have diarrhea and go to the bathroom on myself and just lay there in it\" \"why would I want to do that?\" He states he believes his doctors are \"working on getting me to a rehab or something.\" States he has been participating in care and states \"the girl from pt or ot told me she was going to put in the notes I'm a prime candidate for PT.\" Denies si/hi/avh today, reports slept well, eating well. Denies other concerns. Denies interest in medications. Utilizes humor as coping skill. States he enjoys the nurses and attention from nurses and support.     Objective   Objective     Vitals:   Temp:  [97.6 °F (36.4 °C)-98.2 °F (36.8 °C)] 98.1 °F (36.7 °C)  Heart Rate:  [78-92] 86  Resp:  [18-20] 18  BP: ()/(44-63) 108/63        Mental Status Exam:      Appearance:   obese, self care in tact   Reliability:   fair  Eye Contact:   fair  Concentration/Focus:    fair/fair  Behaviors:    laying in bed  Memory :    in tact  Speech:    normal rate, pleasant tone, normal volume  Language:   english, fluent  Mood :    \"ok\"  Affect:    pleasant  Thought process: linear, goal directed  Thought Content:    denies si hi and avh, no delusional content  Insight: fair during assessment   Judgement:    fair during assessment    Review of Systems:     A complete 14 point review of systems was completed and all systems were negative unless otherwise noted in HPI above.     Result Review    Result Review:  I have personally reviewed the results from the time of this admission to " 10/25/2023 15:41 EDT and agree with these findings:  [x]  Laboratory  []  Microbiology  []  Radiology  []  EKG/Telemetry   []  Cardiology/Vascular   []  Pathology  []  Old records  []  Other:    Medications:   ammonium lactate, , Topical, Daily  apixaban, 5 mg, Oral, Q12H  atorvastatin, 10 mg, Oral, Nightly  bacitracin, 1 application , Topical, BID  cholestyramine, 1 packet, Oral, Q12H  empagliflozin, 10 mg, Oral, Daily  famotidine, 40 mg, Oral, Daily  insulin detemir, 50 Units, Subcutaneous, Q12H  insulin lispro, 4-24 Units, Subcutaneous, 4x Daily AC & at Bedtime  Insulin Lispro, 5 Units, Subcutaneous, TID With Meals  lisinopril, 2.5 mg, Oral, Q24H  miconazole, 1 application , Topical, Q12H  pregabalin, 75 mg, Oral, TID  saccharomyces boulardii, 250 mg, Oral, BID  sodium chloride, 10 mL, Intravenous, Q12H  vitamin B-12, 1,000 mcg, Oral, Daily        Assessment & Plan   Assessment / Plan       Active Hospital Problems:  Active Hospital Problems    Diagnosis     **Generalized weakness     Type 2 diabetes mellitus, with long-term current use of insulin     Class 3 severe obesity due to excess calories with serious comorbidity and body mass index (BMI) of 45.0 to 49.9 in adult     Dependence on wheelchair     Foot pain, bilateral     Paroxysmal atrial fibrillation     Essential hypertension     Diabetic ulcer of left heel associated with type 2 DM        Recommendations:    No new recommendations.     Patient denies si/hi/avh again today. He reports future orientation, identifies he has a support system, identifies reasons to live,denies hopelessness. Denies depression or anxiety. Denies access to firearms. He denies interest in pharmacalogical psychaitric treatment and does not endorse current symptoms.         He is declining psychiatric interventions and does not endorse psychiatric symptoms. He does not meet criteria for legal hold.      Patient is alert and oriented to person place time and situation. Memory in  tact. He verbalizes understanding of medical conditions, treatments, and risk of not accepting treatment or refusing treatment as well as benefits of treatment. He demonstrates in tact insight and judgement. Based on my evaluation it is this writers assessment that patient has capacity to make medical decisions. He demonstrates understanding of his decisions.    Electronically signed by Brayan Littlejohn MD, 10/25/23, 3:41 PM EDT.

## 2023-10-26 LAB
GLUCOSE BLDC GLUCOMTR-MCNC: 125 MG/DL (ref 70–99)
GLUCOSE BLDC GLUCOMTR-MCNC: 157 MG/DL (ref 70–99)
GLUCOSE BLDC GLUCOMTR-MCNC: 195 MG/DL (ref 70–99)
GLUCOSE BLDC GLUCOMTR-MCNC: 217 MG/DL (ref 70–99)

## 2023-10-26 PROCEDURE — 63710000001 INSULIN DETEMIR PER 5 UNITS: Performed by: FAMILY MEDICINE

## 2023-10-26 PROCEDURE — 99231 SBSQ HOSP IP/OBS SF/LOW 25: CPT | Performed by: FAMILY MEDICINE

## 2023-10-26 PROCEDURE — 82948 REAGENT STRIP/BLOOD GLUCOSE: CPT

## 2023-10-26 PROCEDURE — 63710000001 INSULIN LISPRO (HUMAN) PER 5 UNITS: Performed by: INTERNAL MEDICINE

## 2023-10-26 RX ADMIN — INSULIN LISPRO 4 UNITS: 100 INJECTION, SOLUTION INTRAVENOUS; SUBCUTANEOUS at 20:29

## 2023-10-26 RX ADMIN — INSULIN LISPRO 5 UNITS: 100 INJECTION, SOLUTION INTRAVENOUS; SUBCUTANEOUS at 11:59

## 2023-10-26 RX ADMIN — INSULIN LISPRO 8 UNITS: 100 INJECTION, SOLUTION INTRAVENOUS; SUBCUTANEOUS at 11:59

## 2023-10-26 RX ADMIN — IBUPROFEN 400 MG: 400 TABLET, FILM COATED ORAL at 15:56

## 2023-10-26 RX ADMIN — Medication 250 MG: at 20:29

## 2023-10-26 RX ADMIN — LISINOPRIL 2.5 MG: 2.5 TABLET ORAL at 08:44

## 2023-10-26 RX ADMIN — PREGABALIN 75 MG: 75 CAPSULE ORAL at 20:29

## 2023-10-26 RX ADMIN — PREGABALIN 75 MG: 75 CAPSULE ORAL at 16:25

## 2023-10-26 RX ADMIN — HYDROCODONE BITARTRATE AND ACETAMINOPHEN 1 TABLET: 7.5; 325 TABLET ORAL at 22:28

## 2023-10-26 RX ADMIN — Medication: at 09:15

## 2023-10-26 RX ADMIN — ATORVASTATIN CALCIUM 10 MG: 10 TABLET, FILM COATED ORAL at 20:29

## 2023-10-26 RX ADMIN — INSULIN LISPRO 5 UNITS: 100 INJECTION, SOLUTION INTRAVENOUS; SUBCUTANEOUS at 17:21

## 2023-10-26 RX ADMIN — INSULIN DETEMIR 55 UNITS: 100 INJECTION, SOLUTION SUBCUTANEOUS at 08:44

## 2023-10-26 RX ADMIN — CYANOCOBALAMIN TAB 500 MCG 1000 MCG: 500 TAB at 08:44

## 2023-10-26 RX ADMIN — CHOLESTYRAMINE 1 PACKET: 4 POWDER, FOR SUSPENSION ORAL at 20:29

## 2023-10-26 RX ADMIN — APIXABAN 5 MG: 5 TABLET, FILM COATED ORAL at 20:29

## 2023-10-26 RX ADMIN — PREGABALIN 75 MG: 75 CAPSULE ORAL at 08:44

## 2023-10-26 RX ADMIN — BACLOFEN 10 MG: 10 TABLET ORAL at 03:59

## 2023-10-26 RX ADMIN — HYDROCODONE BITARTRATE AND ACETAMINOPHEN 1 TABLET: 7.5; 325 TABLET ORAL at 03:59

## 2023-10-26 RX ADMIN — FAMOTIDINE 40 MG: 20 TABLET, FILM COATED ORAL at 08:44

## 2023-10-26 RX ADMIN — INSULIN LISPRO 5 UNITS: 100 INJECTION, SOLUTION INTRAVENOUS; SUBCUTANEOUS at 08:44

## 2023-10-26 RX ADMIN — HYDROCODONE BITARTRATE AND ACETAMINOPHEN 1 TABLET: 7.5; 325 TABLET ORAL at 16:25

## 2023-10-26 RX ADMIN — IBUPROFEN 400 MG: 400 TABLET, FILM COATED ORAL at 09:13

## 2023-10-26 RX ADMIN — BACLOFEN 10 MG: 10 TABLET ORAL at 11:59

## 2023-10-26 RX ADMIN — HYDROCODONE BITARTRATE AND ACETAMINOPHEN 1 TABLET: 7.5; 325 TABLET ORAL at 10:27

## 2023-10-26 RX ADMIN — Medication 250 MG: at 08:44

## 2023-10-26 RX ADMIN — BACITRACIN 0.9 G: 500 OINTMENT TOPICAL at 09:15

## 2023-10-26 RX ADMIN — APIXABAN 5 MG: 5 TABLET, FILM COATED ORAL at 08:44

## 2023-10-26 RX ADMIN — INSULIN LISPRO 4 UNITS: 100 INJECTION, SOLUTION INTRAVENOUS; SUBCUTANEOUS at 17:21

## 2023-10-26 RX ADMIN — CHOLESTYRAMINE 1 PACKET: 4 POWDER, FOR SUSPENSION ORAL at 08:44

## 2023-10-26 RX ADMIN — BACLOFEN 10 MG: 10 TABLET ORAL at 23:22

## 2023-10-26 RX ADMIN — IBUPROFEN 400 MG: 400 TABLET, FILM COATED ORAL at 23:22

## 2023-10-26 RX ADMIN — BACITRACIN 0.9 G: 500 OINTMENT TOPICAL at 20:29

## 2023-10-26 RX ADMIN — EMPAGLIFLOZIN 10 MG: 10 TABLET, FILM COATED ORAL at 08:44

## 2023-10-26 RX ADMIN — INSULIN DETEMIR 55 UNITS: 100 INJECTION, SOLUTION SUBCUTANEOUS at 20:29

## 2023-10-26 NOTE — PROGRESS NOTES
[facility]   Hospitalist Progress Note  Date: 10/26/2023  Patient Name: Jose Shaikh  : 1960  MRN: [id number]  Date of admission: 9/10/2023      Subjective   Subjective     Chief Complaint: Follow-up weakness    Summary:Jose Shaikh is a 63 y.o. male  initially hospitalized on 9/10/2023, prolonged hospitalization for treatment of management of generalized weakness, deconditioning, with acute issues of diarrhea, C. difficile negative.  Diarrhea improved.  Had small hematoma after ambulating on his foot.  Unfortunately with exhaustive efforts to try to place this gentleman after 39 days of hospitalization, we are unable to find a facility that will accept him.  He has no further acute needs or requirements for inpatient monitoring and management, his labs and vitals are stable, he is tolerating oral intake, and has been refusing turns and repositionings and other interventions with nursing staff despite recommendations.  Patient discharged in hemodynamically stable addition on 10/19/2023 to follow-up with PCP within 1 week.  Unfortunately we cannot solve all of his social issues during this hospitalization due to lack of resources and participation from the patient's perspective despite all our efforts.  Patient has a financial means of making arrangements at home.  Patient appealed his discharge.  Lost his appeal.  LCD: 10/20/23, PFL beginning: 10/21/23 @ 12pm.  Discharge planning continues to work on inpatient placement.    Interval Followup: Patient seen this afternoon and appeared to be resting comfortably.  No new issues per staff.  Still awaiting inpatient placement.  Discharge planning coordinating.    Review of Systems  Constitutional: Negative for fatigue and fever.   HENT: Negative for sore throat and trouble swallowing.    Eyes: Negative for pain and discharge.   Respiratory: Negative for cough and shortness of breath.    Cardiovascular: Negative for chest pain and palpitations.    Gastrointestinal: Negative for abdominal pain, nausea and vomiting.   Endocrine: Negative for cold intolerance and heat intolerance.   Genitourinary: Negative for difficulty urinating and dysuria.   Musculoskeletal: Negative for back pain and neck stiffness.   Skin: Negative for color change and rash.   Neurological: Negative for syncope and headaches.   Hematological: Negative for adenopathy.   Psychiatric/Behavioral: Negative for confusion and hallucinations.    Objective   Objective     Vitals:   Temp:  [97.6 °F (36.4 °C)-98.1 °F (36.7 °C)] 98 °F (36.7 °C)  Heart Rate:  [77-86] 82  Resp:  [18-20] 18  BP: (107-133)/(55-69) 107/69  Physical Exam   General: well-developed appearing stated age obese in no acute distress  HEENT: Normocephalic atraumatic moist membranes   Pulmonary:symmetric chest expansion, unlabored  Gastrointestinal:  nondistended   Skin: Clean dry   Neuro: grossly no sensorimotor deficits appreciated bilateral upper and lower extremities  Psych: Patient is calm not responding to internal stimuli  : No King catheter     Result Review    Result Review:  I have personally reviewed these results and agree with these findings:  [x]  Laboratory  LAB RESULTS:                                    Brief Urine Lab Results  (Last result in the past 365 days)        Color   Clarity   Blood   Leuk Est   Nitrite   Protein   CREAT   Urine HCG        09/11/23 2200 Dark Yellow   Clear   Negative   Negative   Negative   Negative                 Microbiology Results (last 10 days)       Procedure Component Value - Date/Time    Enteric Bacterial Panel - Stool, Per Rectum [[id number]]  (Normal) Collected: 10/17/23 2026    Lab Status: Final result Specimen: Stool from Per Rectum Updated: 10/18/23 0856     Salmonella Not Detected     Campylobacter Not Detected     Shigella/Enteroinvasive E. coli (EIEC) Not Detected     Shiga-like toxin-producing E. coli (STEC) stx1/stx2 Not Detected    Clostridioides difficile Toxin -  Stool, Per Rectum [[id number]] Collected: 10/17/23 0144    Lab Status: Final result Specimen: Stool from Per Rectum Updated: 10/17/23 0248    Narrative:      The following orders were created for panel order Clostridioides difficile Toxin - Stool, Per Rectum.  Procedure                               Abnormality         Status                     ---------                               -----------         ------                     Clostridioides difficile...[[id number]]                      Final result                 Please view results for these tests on the individual orders.    Clostridioides difficile Toxin, PCR - Stool, Per Rectum [[id number]] Collected: 10/17/23 0144    Lab Status: Final result Specimen: Stool from Per Rectum Updated: 10/17/23 0248     Toxigenic C. difficile by PCR Negative     027 Toxin Presumptive Negative    Narrative:      The result indicates the absence of toxigenic C. difficile from stool specimen.             [x]  Microbiology  []  Radiology  No radiology results for the last 7 days    []  EKG/Telemetry   []  Cardiology/Vascular   []  Pathology  []  Old records  [x]  Other:  Scheduled Meds:ammonium lactate, , Topical, Daily  apixaban, 5 mg, Oral, Q12H  atorvastatin, 10 mg, Oral, Nightly  bacitracin, 1 application , Topical, BID  cholestyramine, 1 packet, Oral, Q12H  empagliflozin, 10 mg, Oral, Daily  famotidine, 40 mg, Oral, Daily  insulin detemir, 55 Units, Subcutaneous, Q12H  insulin lispro, 4-24 Units, Subcutaneous, 4x Daily AC & at Bedtime  Insulin Lispro, 5 Units, Subcutaneous, TID With Meals  lisinopril, 2.5 mg, Oral, Q24H  pregabalin, 75 mg, Oral, TID  saccharomyces boulardii, 250 mg, Oral, BID  sodium chloride, 10 mL, Intravenous, Q12H  vitamin B-12, 1,000 mcg, Oral, Daily      Continuous Infusions:   PRN Meds:.  acetaminophen    baclofen    senna-docusate sodium **AND** polyethylene glycol **AND** bisacodyl **AND** bisacodyl    dextrose    dextrose    diphenoxylate-atropine     glucagon (human recombinant)    HYDROcodone-acetaminophen    hydrocortisone-bacitracin-zinc oxide-nystatin    ibuprofen    ondansetron    ondansetron ODT    simethicone    sodium chloride    sodium chloride      Assessment & Plan   Assessment / Plan     Assessment/Plan:  Assessment:  Generalized weakness  Deconditioning  Type 2 diabetes mellitus  Essential hypertension  Chronic paroxysmal atrial fibrillation on Eliquis  Obesity (BMI: 42.88)  Debility with wheelchair dependence  Severe degenerative joint disease of lower extremities  Chronic wound  Thrombocytopenia     Plan:  Labs and imaging reviewed  Patient was discharged initially on 10/19/2023, patient appealed discharge, lost appeal, initial discharge order was canceled by accident and reentered on 10/21/2023, patient remains discharged as of 10/19/2023 but refuses to leave.  PFL beginning 10/21/2023 at 12 PM.  Continue to work with  to determine next steps  Encourage patient to mobilize with therapists and staff  Continue blood sugar control with current regimen Levemir, Humalog and titrate as needed  Patient can be discharged home at any time; discharge order remains  Continue Florastor  Continue as needed Imodium and scheduled Questran  Continue other medications as ordered  PT/OT/ consulted and following         Discussed plan with bedside RN and .    DVT prophylaxis:  Medical DVT prophylaxis orders are present.    CODE STATUS:   Code Status (Patient has no pulse and is not breathing): CPR (Attempt to Resuscitate)  Medical Interventions (Patient has pulse or is breathing): Full Support

## 2023-10-26 NOTE — PLAN OF CARE
Goal Outcome Evaluation:  Plan of Care Reviewed With: patient        Progress: no change  Outcome Evaluation: patient A&Ox4. pt rested in bed throughout the day. Medicated per orders for c/o pain. Wound and skin care performed per orders. Multiple incontinent stools. blood glucose monitored this shift. no new issues/needs at this time.

## 2023-10-27 LAB
GLUCOSE BLDC GLUCOMTR-MCNC: 191 MG/DL (ref 70–99)
GLUCOSE BLDC GLUCOMTR-MCNC: 192 MG/DL (ref 70–99)
GLUCOSE BLDC GLUCOMTR-MCNC: 194 MG/DL (ref 70–99)
GLUCOSE BLDC GLUCOMTR-MCNC: 198 MG/DL (ref 70–99)

## 2023-10-27 PROCEDURE — 63710000001 INSULIN LISPRO (HUMAN) PER 5 UNITS: Performed by: INTERNAL MEDICINE

## 2023-10-27 PROCEDURE — 82948 REAGENT STRIP/BLOOD GLUCOSE: CPT

## 2023-10-27 PROCEDURE — 63710000001 INSULIN DETEMIR PER 5 UNITS: Performed by: FAMILY MEDICINE

## 2023-10-27 PROCEDURE — 99231 SBSQ HOSP IP/OBS SF/LOW 25: CPT | Performed by: FAMILY MEDICINE

## 2023-10-27 PROCEDURE — 97110 THERAPEUTIC EXERCISES: CPT

## 2023-10-27 RX ADMIN — Medication 250 MG: at 21:55

## 2023-10-27 RX ADMIN — CHOLESTYRAMINE 1 PACKET: 4 POWDER, FOR SUSPENSION ORAL at 08:51

## 2023-10-27 RX ADMIN — INSULIN LISPRO 5 UNITS: 100 INJECTION, SOLUTION INTRAVENOUS; SUBCUTANEOUS at 12:54

## 2023-10-27 RX ADMIN — IBUPROFEN 400 MG: 400 TABLET, FILM COATED ORAL at 17:30

## 2023-10-27 RX ADMIN — PREGABALIN 75 MG: 75 CAPSULE ORAL at 08:52

## 2023-10-27 RX ADMIN — INSULIN LISPRO 4 UNITS: 100 INJECTION, SOLUTION INTRAVENOUS; SUBCUTANEOUS at 12:53

## 2023-10-27 RX ADMIN — Medication 250 MG: at 08:53

## 2023-10-27 RX ADMIN — INSULIN LISPRO 4 UNITS: 100 INJECTION, SOLUTION INTRAVENOUS; SUBCUTANEOUS at 08:51

## 2023-10-27 RX ADMIN — APIXABAN 5 MG: 5 TABLET, FILM COATED ORAL at 21:55

## 2023-10-27 RX ADMIN — IBUPROFEN 400 MG: 400 TABLET, FILM COATED ORAL at 05:34

## 2023-10-27 RX ADMIN — FAMOTIDINE 40 MG: 20 TABLET, FILM COATED ORAL at 08:52

## 2023-10-27 RX ADMIN — PREGABALIN 75 MG: 75 CAPSULE ORAL at 15:46

## 2023-10-27 RX ADMIN — ATORVASTATIN CALCIUM 10 MG: 10 TABLET, FILM COATED ORAL at 21:55

## 2023-10-27 RX ADMIN — PREGABALIN 75 MG: 75 CAPSULE ORAL at 21:55

## 2023-10-27 RX ADMIN — HYDROCODONE BITARTRATE AND ACETAMINOPHEN 1 TABLET: 7.5; 325 TABLET ORAL at 11:33

## 2023-10-27 RX ADMIN — INSULIN DETEMIR 55 UNITS: 100 INJECTION, SOLUTION SUBCUTANEOUS at 21:55

## 2023-10-27 RX ADMIN — BACLOFEN 10 MG: 10 TABLET ORAL at 17:30

## 2023-10-27 RX ADMIN — BACLOFEN 10 MG: 10 TABLET ORAL at 07:36

## 2023-10-27 RX ADMIN — BACITRACIN 0.9 G: 500 OINTMENT TOPICAL at 21:55

## 2023-10-27 RX ADMIN — HYDROCODONE BITARTRATE AND ACETAMINOPHEN 1 TABLET: 7.5; 325 TABLET ORAL at 23:57

## 2023-10-27 RX ADMIN — IBUPROFEN 400 MG: 400 TABLET, FILM COATED ORAL at 23:57

## 2023-10-27 RX ADMIN — HYDROCODONE BITARTRATE AND ACETAMINOPHEN 1 TABLET: 7.5; 325 TABLET ORAL at 17:30

## 2023-10-27 RX ADMIN — INSULIN LISPRO 4 UNITS: 100 INJECTION, SOLUTION INTRAVENOUS; SUBCUTANEOUS at 17:30

## 2023-10-27 RX ADMIN — CYANOCOBALAMIN TAB 500 MCG 1000 MCG: 500 TAB at 08:53

## 2023-10-27 RX ADMIN — INSULIN LISPRO 5 UNITS: 100 INJECTION, SOLUTION INTRAVENOUS; SUBCUTANEOUS at 17:30

## 2023-10-27 RX ADMIN — INSULIN LISPRO 5 UNITS: 100 INJECTION, SOLUTION INTRAVENOUS; SUBCUTANEOUS at 08:52

## 2023-10-27 RX ADMIN — EMPAGLIFLOZIN 10 MG: 10 TABLET, FILM COATED ORAL at 08:53

## 2023-10-27 RX ADMIN — Medication: at 11:23

## 2023-10-27 RX ADMIN — INSULIN DETEMIR 60 UNITS: 100 INJECTION, SOLUTION SUBCUTANEOUS at 08:52

## 2023-10-27 RX ADMIN — IBUPROFEN 400 MG: 400 TABLET, FILM COATED ORAL at 11:33

## 2023-10-27 RX ADMIN — HYDROCODONE BITARTRATE AND ACETAMINOPHEN 1 TABLET: 7.5; 325 TABLET ORAL at 05:34

## 2023-10-27 RX ADMIN — LISINOPRIL 2.5 MG: 2.5 TABLET ORAL at 08:53

## 2023-10-27 RX ADMIN — CHOLESTYRAMINE 1 PACKET: 4 POWDER, FOR SUSPENSION ORAL at 21:55

## 2023-10-27 RX ADMIN — APIXABAN 5 MG: 5 TABLET, FILM COATED ORAL at 08:52

## 2023-10-27 RX ADMIN — INSULIN LISPRO 4 UNITS: 100 INJECTION, SOLUTION INTRAVENOUS; SUBCUTANEOUS at 21:54

## 2023-10-27 RX ADMIN — BACITRACIN 0.9 G: 500 OINTMENT TOPICAL at 11:23

## 2023-10-27 RX ADMIN — ACETAMINOPHEN 650 MG: 325 TABLET ORAL at 15:46

## 2023-10-27 NOTE — THERAPY TREATMENT NOTE
Acute Care - Physical Therapy Treatment Note  [facility]     Patient Name: Jose Shaikh  : 1960  MRN: [id number]  Today's Date: 10/27/2023      Visit Dx:     ICD-10-CM ICD-9-CM   1. Generalized weakness  R53.1 780.79   2. Hyponatremia  E87.1 276.1   3. Difficulty in walking  R26.2 719.7   4. Decreased activities of daily living (ADL)  Z78.9 V49.89   5. Difficulty walking  R26.2 719.7     Patient Active Problem List   Diagnosis    Diabetic ulcer of left heel associated with type 2 DM    Acute osteomyelitis of left calcaneus     Diabetic ulcer of left heel associated with type 2 DM    Diabetic ulcer of right midfoot associated with type 2 DM    Paroxysmal atrial fibrillation    Essential hypertension    Hyperlipidemia LDL goal <100    Cellulitis and abscess of foot    High alkaline phosphatase level    Osteomyelitis    Onychomycosis    Onychocryptosis    Foot pain, bilateral    Osteomyelitis of foot, right, acute    Cellulitis of right foot    Type 2 diabetes mellitus, with long-term current use of insulin    Class 3 severe obesity due to excess calories with serious comorbidity and body mass index (BMI) of 45.0 to 49.9 in adult    Anxiety disorder, unspecified    Claustrophobia    Dependence on wheelchair    Depression, unspecified    Long term (current) use of anticoagulants    Long term (current) use of oral hypoglycemic drugs    Wound of foot    Non-prs chronic ulcer oth prt r foot limited to brkdwn skin    Orthostatic hypotension    Other chronic osteomyelitis, right ankle and foot    Personal history of nicotine dependence    Thrombocytopenia, unspecified    Unspecified open wound, right foot, initial encounter    Diabetic foot infection    Subacute osteomyelitis of right foot    Right foot pain    Sepsis    Onychomycosis    Foot pain, left    Impaired mobility and ADLs    Absence of toe of right foot    Corns and callosity    Disability of walking    Fracture    Limb swelling    Polyneuropathy     Pressure ulcer, stage 1    Shortness of breath    Generalized weakness     Past Medical History:   Diagnosis Date    Absence of toe of right foot     Acute osteomyelitis of left calcaneus  8/18/2021    Anxiety and depression     Arthritis     Claustrophobia     Corns and callus     Diabetic ulcer of left heel associated with type 2 DM 8/18/2021    Diabetic ulcer of left heel associated with type 2 DM 7/6/2021    Diabetic ulcer of right midfoot associated with type 2 DM 8/18/2021    Difficulty walking     Essential hypertension 8/31/2021    Hammertoe     Hyperlipidemia LDL goal <100 8/31/2021    Ingrown toenail     Obesity     Paroxysmal atrial fibrillation 8/31/2021    Polyneuropathy     Pressure ulcer, stage 1     Tinea unguium     Type 2 diabetes mellitus with polyneuropathy      Past Surgical History:   Procedure Laterality Date    CYST REMOVAL      center of back; benign    INCISION AND DRAINAGE ABSCESS      back    INCISION AND DRAINAGE LEG Right 12/10/2021    Procedure: INCISION AND DRAINAGE LOWER EXTREMITY;  Surgeon: Ash Leyva DPM;  Location: [facility];  Service: Podiatry;  Laterality: Right;    OTHER SURGICAL HISTORY      Surgical clips left foot    TOE SURGERY Right     Removal of 5th toe    TRANS METATARSAL AMPUTATION Right 12/2/2021    Procedure: AMPUTATION TRANS METATARSAL;  Surgeon: Ash Leyva DPM;  Location: [facility] OR;  Service: Podiatry;  Laterality: Right;    WRIST SURGERY Left     repair of injury     PT Assessment (last 12 hours)       PT Evaluation and Treatment       Row Name 10/27/23 1400          Physical Therapy Time and Intention    Subjective Information complains of;weakness;fatigue;pain (P)   -ZT     Document Type therapy note (daily note) (P)   -ZT     Mode of Treatment individual therapy;physical therapy (P)   -ZT     Patient Effort good (P)   -ZT       Row Name 10/27/23 1400          General Information    Patient Profile Reviewed yes (P)   -ZT      Patient Observations alert;cooperative;agree to therapy (P)   -     Existing Precautions/Restrictions fall (P)   -       Row Name 10/27/23 1400          Bed Mobility    Bed Mobility other (see comments) (P)   TherEx performed in supine  -       Row Name 10/27/23 1400          Transfers    Transfers other (see comments) (P)   Tfs declined  -       Row Name 10/27/23 1400          Gait/Stairs (Locomotion)    Gait/Stairs Locomotion other (see comments) (P)   AMB declined  -       Row Name 10/27/23 1400          Safety Issues, Functional Mobility    Impairments Affecting Function (Mobility) balance;endurance/activity tolerance;strength (P)   -       Row Name 10/27/23 1400          Balance    Balance Assessment other (see comments) (P)   Pt remained in supine for tx session  -       Row Name 10/27/23 1400          Motor Skills    Therapeutic Exercise hip;knee;ankle (P)   -       Row Name 10/27/23 1400          Hip (Therapeutic Exercise)    Hip Strengthening (Therapeutic Exercise) aBduction;aDduction;20 repititions;other (see comments) (P)   Pt performed 20 reps of glute sets and hip adduction/abduction  -       Row Name 10/27/23 1400          Knee (Therapeutic Exercise)    Knee Strengthening (Therapeutic Exercise) SLR (straight leg raise);20 repititions;other (see comments);10 repetitions (P)   Pt performed 20 reps of quad sets and 10 reps of SLR bilaterally  -[facility] Name 10/27/23 1400          Ankle (Therapeutic Exercise)    Ankle Strengthening (Therapeutic Exercise) dorsiflexion;plantarflexion;20 repititions;other (see comments) (P)   Pt performed 20 reps of ankle pumps  -       Row Name             Wound 12/02/21 Right anterior foot Incision    Wound - Properties Group Placement Date: 12/02/21  -ES Side: Right  -ES Orientation: anterior  -ES Location: foot  -ES Primary Wound Type: Incision  -ES    Retired Wound - Properties Group Placement Date: 12/02/21  -ES Side: Right  -ES Orientation:  anterior  -ES Location: foot  -ES Primary Wound Type: Incision  -ES    Retired Wound - Properties Group Date first assessed: 12/02/21  -ES Side: Right  -ES Location: foot  -ES Primary Wound Type: Incision  -ES      Row Name             Wound 10/17/23 0546 Bilateral perirectal MASD (Moisture associated skin damage)    Wound - Properties Group Placement Date: 10/17/23  -AS Placement Time: 0546  -AS Present on Original Admission: N  -AS Side: Bilateral  -AS Location: perirectal  -AS Primary Wound Type: MASD  -AS    Retired Wound - Properties Group Placement Date: 10/17/23  -AS Placement Time: 0546  -AS Present on Original Admission: N  -AS Side: Bilateral  -AS Location: perirectal  -AS Primary Wound Type: MASD  -AS    Retired Wound - Properties Group Date first assessed: 10/17/23  -AS Time first assessed: 0546  -AS Present on Original Admission: N  -AS Side: Bilateral  -AS Location: perirectal  -AS Primary Wound Type: MASD  -AS      Row Name 10/27/23 1400          Plan of Care Review    Plan of Care Reviewed With patient (P)   -ZT       Row Name 10/27/23 1400          Positioning and Restraints    Pre-Treatment Position in bed (P)   -ZT     Post Treatment Position bed (P)   -ZT     In Bed call light within reach;encouraged to call for assist;exit alarm on (P)   -ZT       Row Name 10/27/23 1400          Therapy Assessment/Plan (PT)    Rehab Potential (PT) fair, will monitor progress closely (P)   -ZT     Criteria for Skilled Interventions Met (PT) yes;skilled treatment is necessary (P)   -ZT     Therapy Frequency (PT) 3 times/wk (P)   -ZT     Problem List (PT) problems related to;balance;mobility;strength (P)   -ZT     Activity Limitations Related to Problem List (PT) unable to ambulate safely;unable to transfer safely (P)   -ZT       Row Name 10/27/23 1400          Progress Summary (PT)    Progress Toward Functional Goals (PT) progress toward functional goals is fair (P)   -ZT     Daily Progress Summary (PT) Pt  presents in a deconditioned state secondary to recent hospital stay. He tolerated leg exercises better today, but continues to c/o of L hip pain and BLE pain. He continues to require skilled PT services to address these deficits so that he can improve his independence. (P)   -ZT       Row Name 10/27/23 1400          Therapy Plan Review/Discharge Plan (PT)    Therapy Plan Review (PT) evaluation/treatment results reviewed;care plan/treatment goals reviewed;participants included;patient (P)   -ZT               User Key  (r) = Recorded By, (t) = Taken By, (c) = Cosigned By      Initials Name Provider Type    AS Angelina Alfredo, RN Registered Nurse    Katlyn Garcia RN Registered Nurse    Oscar Ward, PT Student PT Student                    Physical Therapy Education       Title: PT OT SLP Therapies (Done)       Topic: Physical Therapy (Done)       Point: Mobility training (Done)       Learning Progress Summary             Patient Acceptance, E,TB, VU by ZT at 10/24/2023 1157    Acceptance, E, VU,NR by RASHARD at 9/24/2023 0604    Acceptance, E, VU,DU by RF at 9/18/2023 1537    Acceptance, E, VU,DU by RF at 9/17/2023 1515    Acceptance, E, VU,DU by RF at 9/16/2023 1804    Acceptance, E, VU by AV at 9/13/2023 1059    Acceptance, E,TB, VU by  at 9/12/2023 1308                         Point: Home exercise program (Done)       Learning Progress Summary             Patient Acceptance, E, VU,NR by RASHARD at 9/24/2023 0604    Acceptance, E, VU,DU by RF at 9/18/2023 1537    Acceptance, E, VU,DU by RF at 9/17/2023 1515    Acceptance, E, VU,DU by RF at 9/16/2023 1804    Acceptance, E, VU by AV at 9/13/2023 1059    Acceptance, E,TB, VU by  at 9/12/2023 1308                         Point: Body mechanics (Done)       Learning Progress Summary             Patient Acceptance, E,TB, VU by ZT at 10/24/2023 1157    Acceptance, E, VU,NR by RASHARD at 9/24/2023 0604    Acceptance, E, VU,DU by RF at 9/18/2023 1537    Acceptance, E, VU,DU by  RF at 9/17/2023 1515    Acceptance, E, VU,DU by RF at 9/16/2023 1804    Acceptance, E, VU by AV at 9/13/2023 1059    Acceptance, E,TB, VU by  at 9/12/2023 1308                         Point: Precautions (Done)       Learning Progress Summary             Patient Acceptance, E,TB, VU by ZT at 10/24/2023 1157    Acceptance, E, VU,NR by RASHARD at 9/24/2023 0604    Acceptance, E, VU,DU by RF at 9/18/2023 1537    Acceptance, E, VU,DU by RF at 9/17/2023 1515    Acceptance, E, VU,DU by RF at 9/16/2023 1804    Acceptance, E, VU by AV at 9/13/2023 1059    Acceptance, E,TB, VU by  at 9/12/2023 1308                                         User Key       Initials Effective Dates Name Provider Type Discipline     06/16/21 -  Selena Lindquist, RN Registered Nurse Nurse    AV 06/16/21 -  Uvaldo Christine OT Occupational Therapist OT     01/19/22 -  Karl Baker, RN Registered Nurse Nurse     08/16/23 -  Gamal Simmons, PT Student PT Student PT     09/05/23 -  Oscar Shields, PT Student PT Student PT                  PT Recommendation and Plan  Anticipated Discharge Disposition (PT): (P) inpatient rehabilitation facility  Planned Therapy Interventions (PT): (P) balance training, bed mobility training, gait training, stair training, strengthening, transfer training  Therapy Frequency (PT): (P) 3 times/wk  Progress Summary (PT)  Progress Toward Functional Goals (PT): (P) progress toward functional goals is fair  Daily Progress Summary (PT): (P) Pt presents in a deconditioned state secondary to recent hospital stay. He tolerated leg exercises better today, but continues to c/o of L hip pain and BLE pain. He continues to require skilled PT services to address these deficits so that he can improve his independence.  Plan of Care Reviewed With: (P) patient  Outcome Evaluation: Pt presents in a deconditioned state secondary to recent hospital stay. He requires assistance to move around in bed and cannot tolerate much movement  at all due to BLE pain and decreased mobility. He continues to require skilled PT services to address these deficits to improve his independence.   Outcome Measures       Row Name 10/27/23 1400             How much help from another person do you currently need...    Turning from your back to your side while in flat bed without using bedrails? 4 (P)   -ZT      Moving from lying on back to sitting on the side of a flat bed without bedrails? 3 (P)   -ZT      Moving to and from a bed to a chair (including a wheelchair)? 3 (P)   -ZT      Standing up from a chair using your arms (e.g., wheelchair, bedside chair)? 2 (P)   -ZT      Climbing 3-5 steps with a railing? 1 (P)   -ZT      To walk in hospital room? 1 (P)   -ZT      AM-PAC 6 Clicks Score (PT) 14 (P)   -ZT      Highest level of mobility 4 --> Transferred to chair/commode (P)   -ZT                User Key  (r) = Recorded By, (t) = Taken By, (c) = Cosigned By      Initials Name Provider Type    Oscar Ward PT Student PT Student                     Time Calculation:    PT Charges       Row Name 10/27/23 1435             Time Calculation    PT Received On 10/27/23 (P)   -ZT         Timed Charges    18913 - PT Therapeutic Exercise Minutes 15 (P)   -ZT         Total Minutes    Timed Charges Total Minutes 15 (P)   -ZT       Total Minutes 15 (P)   -ZT                User Key  (r) = Recorded By, (t) = Taken By, (c) = Cosigned By      Initials Name Provider Type    Oscar Ward PT Student PT Student                      PT G-Codes  Outcome Measure Options: AM-PAC 6 Clicks Daily Activity (OT), Optimal Instrument  AM-PAC 6 Clicks Score (PT): (P) 14  AM-PAC 6 Clicks Score (OT): 15    Oscar Shields PT Student  10/27/2023

## 2023-10-27 NOTE — DISCHARGE PLACEMENT REQUEST
"Sami Ramirez (63 y.o. Male)       Date of Birth   1960    Social Security Number       Address   [address] [facility] [address]    Home Phone   [phone]    MRN   [id number]       Latter day   None    Marital Status                               Admission Date   9/10/23    Admission Type   Emergency    Admitting Provider   John Azar MD    Attending Provider   John Azar MD    Department, Room/Bed   [address], 4027/1       Discharge Date       Discharge Disposition   Home-Health Care [facility]    Discharge Destination                                 Attending Provider: John Azar MD    Allergies: Adhesive Tape    Isolation: None   Infection: None   Code Status: CPR    Ht: 188 cm (74\")   Wt: 151 kg (334 lb)    Admission Cmt: None   Principal Problem: Generalized weakness [R53.1]                   Active Insurance as of 9/10/2023       Primary Coverage       Payor Plan Insurance Group Employer/Plan Group    [place] [facility] MEDICARE REPLACEMENT [facility] DUAL COMPLETE MEDICARE REPLACEMENT [id number]       Payor Plan Address Payor Plan Phone Number Payor Plan Fax Number Effective Dates    [address] [phone]  5/1/2022 - None Entered    [facility] [address]         Subscriber Name Subscriber Birth Date Member ID       SAMI RAMIREZ 1960 [id number]               Secondary Coverage       Payor Plan Insurance Group Employer/Plan Group    [place]        Payor Plan Address Payor Plan Phone Number Payor Plan Fax Number Effective Dates    [address] [phone]  6/22/2021 - None Entered    [facility] [id number]         Subscriber Name Subscriber Birth Date Member ID       SAMI RAMIREZ 1960 [id number]                     Emergency Contacts        (Rel.) Home Phone Work Phone Mobile Phone    RAMONITA RAMIREZ JR (Son) -- -- [phone]    JAMESMICHELLE (Daughter) -- -- [phone]    " Michelle Chandra (Friend) -- -- [phone]              Emergency Contact Information       Name Relation Home Work Mobile    RAMONITA SHAIKH JR Son   [phone]    MICHELLE SHAIKH Daughter   [phone]    Michelle Chandra Friend   [phone]          Insurance Information                  [facility] MEDICARE REPLACEMENT/[facility] DUAL COMPLETE MEDICARE REPLACEMENT Phone: [phone]    Subscriber: Jose Shaikh Subscriber#: [id number]    Group#: [id number] Precert#: --        [place] Phone: [phone]    Subscriber: Jose Shaikh Subscriber#: [id number]    Group#: -- Precert#: --             History & Physical        Levi Finney MD at 23 0237           [facility]   HISTORY AND PHYSICAL    Patient Name: Jose Shaikh  : 1960  MRN: [id number]  Primary Care Physician:  Delia Cervantes, APRN  Date of admission: 9/10/2023    Subjective   Subjective     Chief Complaint: Generalized weakness    HPI:    Jose Shaikh is a 62 y.o. male with past medical history of diabetes with polyneuropathy and foot amputation, hypertension, chronic wounds, morbid obesity, anxiety/depression, and GERD presented via EMS with generalized weakness.  Patient was discharged from [place] rehab yesterday and stated that he could barely move or get out of his car so EMS was called to bring him to the ED.  Patient states that due to nonweightbearing restrictions that was implemented by wound care during his rehab, physical therapy did not work on strengthening his left lower extremity.  Due to the restriction patient feels as though his rehab was not adequate resulted in him being unable to care for himself upon discharge.  In the ED patient's vitals were all within normal limits on arrival.  Labs showed that he did have significant hyponatremia with remaining labs being relatively unremarkable given his chronic conditions.  When asked he denied any recent fevers, chills,  headaches, chest pain, palpitation, shortness of breath, cough, abdominal pain, nausea, vomiting, diarrhea, constipation, dysuria, hematuria, hematochezia, melena, or anxiety.  Patient was admitted for further evaluation and treatment.    Review of Systems   All systems were reviewed and negative except for: As specified in HPI    Personal History     Past Medical History:   Diagnosis Date    Absence of toe of right foot     Acute osteomyelitis of left calcaneus  8/18/2021    Anxiety and depression     Arthritis     Claustrophobia     Corns and callus     Diabetic ulcer of left heel associated with type 2 DM 8/18/2021    Diabetic ulcer of left heel associated with type 2 DM 7/6/2021    Diabetic ulcer of right midfoot associated with type 2 DM 8/18/2021    Difficulty walking     Essential hypertension 8/31/2021    Hammertoe     Hyperlipidemia LDL goal <100 8/31/2021    Ingrown toenail     Obesity     Paroxysmal atrial fibrillation 8/31/2021    Polyneuropathy     Pressure ulcer, stage 1     Tinea unguium     Type 2 diabetes mellitus with polyneuropathy        Past Surgical History:   Procedure Laterality Date    CYST REMOVAL      center of back; benign    INCISION AND DRAINAGE ABSCESS      back    INCISION AND DRAINAGE LEG Right 12/10/2021    Procedure: INCISION AND DRAINAGE LOWER EXTREMITY;  Surgeon: Ash Leyva DPM;  Location: [facility];  Service: Podiatry;  Laterality: Right;    OTHER SURGICAL HISTORY      Surgical clips left foot    TOE SURGERY Right     Removal of 5th toe    TRANS METATARSAL AMPUTATION Right 12/2/2021    Procedure: AMPUTATION TRANS METATARSAL;  Surgeon: Ash Leyva DPM;  Location: [facility];  Service: Podiatry;  Laterality: Right;    WRIST SURGERY Left     repair of injury       Family History: family history includes Cancer in his father; Heart disease in his brother, father, and mother. Otherwise pertinent FHx was reviewed and not pertinent to current  issue.    Social History:  reports that he quit smoking about 32 years ago. His smoking use included cigarettes. He has never used smokeless tobacco. He reports current alcohol use. He reports current drug use. Drug: Marijuana.    Home Medications:  Dulaglutide, HYDROcodone-acetaminophen, Insulin Lispro (1 Unit Dial), acetaminophen, apixaban, digoxin, insulin detemir, metFORMIN, metoprolol succinate XL, naloxone, pregabalin, and simvastatin      Allergies:  Allergies   Allergen Reactions    Adhesive Tape Rash       Objective   Objective     Vitals:   Temp:  [97.6 °F (36.4 °C)-98 °F (36.7 °C)] 98 °F (36.7 °C)  Heart Rate:  [] 83  Resp:  [18] 18  BP: ()/() 97/44  Physical Exam    Constitutional: Awake, alert   Eyes: PERRLA, sclerae anicteric, no conjunctival injection   HENT: NCAT, mucous membranes moist   Neck: Supple, no thyromegaly, no lymphadenopathy, trachea midline   Respiratory: Clear to auscultation bilaterally, nonlabored respirations    Cardiovascular: RRR, no murmurs, rubs, or gallops, palpable pedal pulses bilaterally   Gastrointestinal: Positive bowel sounds, soft, nontender, nondistended   Musculoskeletal: Transmetatarsal amputation, no clubbing or cyanosis to extremities   Psychiatric: Appropriate affect, cooperative   Neurologic: Oriented x 3, strength symmetric in all extremities, Cranial Nerves grossly intact to confrontation, speech clear   Skin: Healing lower extremity wounds    Result Review    Result Review:  I have personally reviewed the results from the time of this admission to 9/11/2023 02:37 EDT and agree with these findings:  [x]  Laboratory list / accordion  []  Microbiology  []  Radiology  [x]  EKG/Telemetry   []  Cardiology/Vascular   []  Pathology  []  Old records  []  Other:  Most notable findings include: Hyponatremia      Assessment & Plan   Assessment / Plan     Brief Patient Summary:  Jose Shaikh is a 62 y.o. male who ith past medical history of diabetes with  polyneuropathy and foot amputation, hypertension, chronic wounds, morbid obesity, anxiety/depression, and GERD presented via EMS with generalized weakness    Active Hospital Problems:  Active Hospital Problems    Diagnosis     **Generalized weakness     Type 2 diabetes mellitus, with long-term current use of insulin     Class 3 severe obesity due to excess calories with serious comorbidity and body mass index (BMI) of 45.0 to 49.9 in adult     Dependence on wheelchair     Foot pain, bilateral     Paroxysmal atrial fibrillation     Essential hypertension     Diabetic ulcer of left heel associated with type 2 DM      Plan:     Generalized weakness  -Admit to medical floor  -Discharged from inpatient rehab yesterday  -Patient unable to perform ADLs.   -Patient morbidly obese  -Fall precautions  -PT OT  -Wound care  -Case management consulted for likely placement  -Supportive care    Hyponatremia  -Patient euvolemic, asymptomatic  -Glucose mildly elevated  -Superimposed hypochloremia  -EKG reviewed  -Urinalysis pending  -Admits to diarrhea  -NS IVF for now  -Further work-up per clinical course  -Follow labs    DM2  -ISS  -Titrate as needed    HTN  -Currently well controlled  -PRN BP meds  -Resume home meds when available  -Titrate if needed    Hx A-fib  -Resume home AC when available    Hx CKD  Hx CAD  Hx Morbid Obesity  Hx Chronic Wound: Wound care consulted  Debility/Wheelchair dependence     GI ppx  DVT ppx      DVT prophylaxis:  No DVT prophylaxis order currently exists.    CODE STATUS:       Admission Status:  I believe this patient meets inpatient status.      Electronically signed by Levi Finney MD, 23, 2:37 AM EDT.    Electronically signed by Levi Finney MD at 23 0636          Physician Progress Notes (most recent note)        John Azar MD at 10/26/23 1417           [facility]  Date: 10/26/2023  Patient Name: Jose Shaikh  : 1960  MRN:  [id number]  Date of admission: 9/10/2023      Subjective   Subjective     Chief Complaint: Follow-up weakness    Summary:Jose Shaikh is a 63 y.o. male  initially hospitalized on 9/10/2023, prolonged hospitalization for treatment of management of generalized weakness, deconditioning, with acute issues of diarrhea, C. difficile negative.  Diarrhea improved.  Had small hematoma after ambulating on his foot.  Unfortunately with exhaustive efforts to try to place this gentleman after 39 days of hospitalization, we are unable to find a facility that will accept him.  He has no further acute needs or requirements for inpatient monitoring and management, his labs and vitals are stable, he is tolerating oral intake, and has been refusing turns and repositionings and other interventions with nursing staff despite recommendations.  Patient discharged in hemodynamically stable addition on 10/19/2023 to follow-up with PCP within 1 week.  Unfortunately we cannot solve all of his social issues during this hospitalization due to lack of resources and participation from the patient's perspective despite all our efforts.  Patient has a financial means of making arrangements at home.  Patient appealed his discharge.  Lost his appeal.  LCD: 10/20/23, PFL beginning: 10/21/23 @ 12pm.  Discharge planning continues to work on inpatient placement.    Interval Followup: Patient seen this afternoon and appeared to be resting comfortably.  No new issues per staff.  Still awaiting inpatient placement.  Discharge planning coordinating.    Review of Systems  Constitutional: Negative for fatigue and fever.   HENT: Negative for sore throat and trouble swallowing.    Eyes: Negative for pain and discharge.   Respiratory: Negative for cough and shortness of breath.    Cardiovascular: Negative for chest pain and palpitations.   Gastrointestinal: Negative for abdominal pain, nausea and vomiting.   Endocrine: Negative for cold intolerance and heat  intolerance.   Genitourinary: Negative for difficulty urinating and dysuria.   Musculoskeletal: Negative for back pain and neck stiffness.   Skin: Negative for color change and rash.   Neurological: Negative for syncope and headaches.   Hematological: Negative for adenopathy.   Psychiatric/Behavioral: Negative for confusion and hallucinations.    Objective   Objective     Vitals:   Temp:  [97.6 °F (36.4 °C)-98.1 °F (36.7 °C)] 98 °F (36.7 °C)  Heart Rate:  [77-86] 82  Resp:  [18-20] 18  BP: (107-133)/(55-69) 107/69  Physical Exam   General: well-developed appearing stated age obese in no acute distress  HEENT: Normocephalic atraumatic moist membranes   Pulmonary:symmetric chest expansion, unlabored  Gastrointestinal:  nondistended   Skin: Clean dry   Neuro: grossly no sensorimotor deficits appreciated bilateral upper and lower extremities  Psych: Patient is calm not responding to internal stimuli  : No King catheter     Result Review    Result Review:  I have personally reviewed these results and agree with these findings:  [x]  Laboratory  LAB RESULTS:                                    Brief Urine Lab Results  (Last result in the past 365 days)        Color   Clarity   Blood   Leuk Est   Nitrite   Protein   CREAT   Urine HCG        09/11/23 2200 Dark Yellow   Clear   Negative   Negative   Negative   Negative                 Microbiology Results (last 10 days)       Procedure Component Value - Date/Time    Enteric Bacterial Panel - Stool, Per Rectum [[id number]]  (Normal) Collected: 10/17/23 2026    Lab Status: Final result Specimen: Stool from Per Rectum Updated: 10/18/23 0856     Salmonella Not Detected     Campylobacter Not Detected     Shigella/Enteroinvasive E. coli (EIEC) Not Detected     Shiga-like toxin-producing E. coli (STEC) stx1/stx2 Not Detected    Clostridioides difficile Toxin - Stool, Per Rectum [[id number]] Collected: 10/17/23 0144    Lab Status: Final result Specimen: Stool from Per Rectum  Updated: 10/17/23 0248    Narrative:      The following orders were created for panel order Clostridioides difficile Toxin - Stool, Per Rectum.  Procedure                               Abnormality         Status                     ---------                               -----------         ------                     Clostridioides difficile...[[id number]]                      Final result                 Please view results for these tests on the individual orders.    Clostridioides difficile Toxin, PCR - Stool, Per Rectum [[id number]] Collected: 10/17/23 0144    Lab Status: Final result Specimen: Stool from Per Rectum Updated: 10/17/23 0248     Toxigenic C. difficile by PCR Negative     027 Toxin Presumptive Negative    Narrative:      The result indicates the absence of toxigenic C. difficile from stool specimen.             [x]  Microbiology  []  Radiology  No radiology results for the last 7 days    []  EKG/Telemetry   []  Cardiology/Vascular   []  Pathology  []  Old records  [x]  Other:  Scheduled Meds:ammonium lactate, , Topical, Daily  apixaban, 5 mg, Oral, Q12H  atorvastatin, 10 mg, Oral, Nightly  bacitracin, 1 application , Topical, BID  cholestyramine, 1 packet, Oral, Q12H  empagliflozin, 10 mg, Oral, Daily  famotidine, 40 mg, Oral, Daily  insulin detemir, 55 Units, Subcutaneous, Q12H  insulin lispro, 4-24 Units, Subcutaneous, 4x Daily AC & at Bedtime  Insulin Lispro, 5 Units, Subcutaneous, TID With Meals  lisinopril, 2.5 mg, Oral, Q24H  pregabalin, 75 mg, Oral, TID  saccharomyces boulardii, 250 mg, Oral, BID  sodium chloride, 10 mL, Intravenous, Q12H  vitamin B-12, 1,000 mcg, Oral, Daily      Continuous Infusions:   PRN Meds:.  acetaminophen    baclofen    senna-docusate sodium **AND** polyethylene glycol **AND** bisacodyl **AND** bisacodyl    dextrose    dextrose    diphenoxylate-atropine    glucagon (human recombinant)    HYDROcodone-acetaminophen    hydrocortisone-bacitracin-zinc oxide-nystatin     ibuprofen    ondansetron    ondansetron ODT    simethicone    sodium chloride    sodium chloride      Assessment & Plan   Assessment / Plan     Assessment/Plan:  Assessment:  Generalized weakness  Deconditioning  Type 2 diabetes mellitus  Essential hypertension  Chronic paroxysmal atrial fibrillation on Eliquis  Obesity (BMI: 42.88)  Debility with wheelchair dependence  Severe degenerative joint disease of lower extremities  Chronic wound  Thrombocytopenia     Plan:  Labs and imaging reviewed  Patient was discharged initially on 10/19/2023, patient appealed discharge, lost appeal, initial discharge order was canceled by accident and reentered on 10/21/2023, patient remains discharged as of 10/19/2023 but refuses to leave.  PFL beginning 10/21/2023 at 12 PM.  Continue to work with  to determine next steps  Encourage patient to mobilize with therapists and staff  Continue blood sugar control with current regimen Levemir, Humalog and titrate as needed  Patient can be discharged home at any time; discharge order remains  Continue Florastor  Continue as needed Imodium and scheduled Questran  Continue other medications as ordered  PT/OT/ consulted and following         Discussed plan with bedside RN and .    DVT prophylaxis:  Medical DVT prophylaxis orders are present.    CODE STATUS:   Code Status (Patient has no pulse and is not breathing): CPR (Attempt to Resuscitate)  Medical Interventions (Patient has pulse or is breathing): Full Support                       Electronically signed by John Azar MD at 10/26/23 1419          Physical Therapy Notes (most recent note)        Oscar Shields, PT Student at 10/24/23 1157  Version 1 of 1      Attestation signed by Merlin Rao PT at 10/24/23 1317                     Acute Care - Physical Therapy Re-Evaluation   [facility]     Patient Name: Jose Shaikh  : 1960  MRN: [id number]  Today's Date: 10/24/2023      Visit  Dx:     ICD-10-CM ICD-9-CM   1. Generalized weakness  R53.1 780.79   2. Hyponatremia  E87.1 276.1   3. Difficulty in walking  R26.2 719.7   4. Decreased activities of daily living (ADL)  Z78.9 V49.89   5. Difficulty walking  R26.2 719.7     Patient Active Problem List   Diagnosis    Diabetic ulcer of left heel associated with type 2 DM    Acute osteomyelitis of left calcaneus     Diabetic ulcer of left heel associated with type 2 DM    Diabetic ulcer of right midfoot associated with type 2 DM    Paroxysmal atrial fibrillation    Essential hypertension    Hyperlipidemia LDL goal <100    Cellulitis and abscess of foot    High alkaline phosphatase level    Osteomyelitis    Onychomycosis    Onychocryptosis    Foot pain, bilateral    Osteomyelitis of foot, right, acute    Cellulitis of right foot    Type 2 diabetes mellitus, with long-term current use of insulin    Class 3 severe obesity due to excess calories with serious comorbidity and body mass index (BMI) of 45.0 to 49.9 in adult    Anxiety disorder, unspecified    Claustrophobia    Dependence on wheelchair    Depression, unspecified    Long term (current) use of anticoagulants    Long term (current) use of oral hypoglycemic drugs    Wound of foot    Non-prs chronic ulcer oth prt r foot limited to brkdwn skin    Orthostatic hypotension    Other chronic osteomyelitis, right ankle and foot    Personal history of nicotine dependence    Thrombocytopenia, unspecified    Unspecified open wound, right foot, initial encounter    Diabetic foot infection    Subacute osteomyelitis of right foot    Right foot pain    Sepsis    Onychomycosis    Foot pain, left    Impaired mobility and ADLs    Absence of toe of right foot    Corns and callosity    Disability of walking    Fracture    Limb swelling    Polyneuropathy    Pressure ulcer, stage 1    Shortness of breath    Generalized weakness     Past Medical History:   Diagnosis Date    Absence of toe of right foot     Acute  osteomyelitis of left calcaneus  8/18/2021    Anxiety and depression     Arthritis     Claustrophobia     Corns and callus     Diabetic ulcer of left heel associated with type 2 DM 8/18/2021    Diabetic ulcer of left heel associated with type 2 DM 7/6/2021    Diabetic ulcer of right midfoot associated with type 2 DM 8/18/2021    Difficulty walking     Essential hypertension 8/31/2021    Hammertoe     Hyperlipidemia LDL goal <100 8/31/2021    Ingrown toenail     Obesity     Paroxysmal atrial fibrillation 8/31/2021    Polyneuropathy     Pressure ulcer, stage 1     Tinea unguium     Type 2 diabetes mellitus with polyneuropathy      Past Surgical History:   Procedure Laterality Date    CYST REMOVAL      center of back; benign    INCISION AND DRAINAGE ABSCESS      back    INCISION AND DRAINAGE LEG Right 12/10/2021    Procedure: INCISION AND DRAINAGE LOWER EXTREMITY;  Surgeon: Ash Leyva DPM;  Location: [facility];  Service: Podiatry;  Laterality: Right;    OTHER SURGICAL HISTORY      Surgical clips left foot    TOE SURGERY Right     Removal of 5th toe    TRANS METATARSAL AMPUTATION Right 12/2/2021    Procedure: AMPUTATION TRANS METATARSAL;  Surgeon: Ash Leyva DPM;  Location: [facility] MA[facility] OR;  Service: Podiatry;  Laterality: Right;    WRIST SURGERY Left     repair of injury     PT Assessment (last 12 hours)       PT Evaluation and Treatment       Row Name 10/24/23 1157          Physical Therapy Time and Intention    Subjective Information complains of;fatigue;pain (P)   -ZT     Document Type re-evaluation (P)   -ZT     Mode of Treatment individual therapy;physical therapy (P)   -ZT     Patient Effort good (P)   -ZT       Row Name 10/24/23 1154          General Information    Patient Profile Reviewed yes (P)   -ZT     Patient Observations alert;cooperative;agree to therapy (P)   -ZT     Prior Level of Function independent:;all household mobility;ADL's (P)   -ZT     Equipment Currently Used at  Home wheelchair;walker, rolling (P)   -ZT     Existing Precautions/Restrictions fall (P)   -ZT       Row Name 10/24/23 1150          Living Environment    Current Living Arrangements extended care facility (P)   -ZT     Home Accessibility wheelchair accessible (P)   -ZT     People in Home alone (P)   -ZT     Primary Care Provided by self;other (see comments) (P)   -ZT       Row Name 10/24/23 1150          Range of Motion (ROM)    Range of Motion bilateral lower extremities;ROM is WFL (P)   -ZT       Row Name 10/24/23 1150          Strength (Manual Muscle Testing)    Strength (Manual Muscle Testing) bilateral lower extremities;other (see comments) (P)   BLE hip flexion 4+/5, BLE knee extension 4/5, BLE knee flexion 5/5, L DF 5/5  -ZT       Row Name 10/24/23 1150          Bed Mobility    Bed Mobility bed mobility (all) activities (P)   -ZT     All Activities, [place] (Bed Mobility) moderate assist (50% patient effort) (P)   -ZT       Row Name 10/24/23 1150          Transfers    Transfers other (see comments) (P)   Pt declined  -ZT       Row Name 10/24/23 1150          Gait/Stairs (Locomotion)    Gait/Stairs Locomotion other (see comments) (P)   Pt declined  -ZT       Row Name 10/24/23 1150          Safety Issues, Functional Mobility    Impairments Affecting Function (Mobility) balance;endurance/activity tolerance;strength (P)   -ZT       Row Name 10/24/23 1150          Balance    Balance Assessment sitting static balance (P)   -ZT     Static Sitting Balance independent (P)   -ZT     Position, Sitting Balance sitting edge of bed (P)   -ZT       Row Name             Wound 12/02/21 Right anterior foot Incision    Wound - Properties Group Placement Date: 12/02/21  -ES Side: Right  -ES Orientation: anterior  -ES Location: foot  -ES Primary Wound Type: Incision  -ES    Retired Wound - Properties Group Placement Date: 12/02/21  -ES Side: Right  -ES Orientation: anterior  -ES Location: foot  -ES Primary Wound Type:  Incision  -ES    Retired Wound - Properties Group Date first assessed: 12/02/21  -ES Side: Right  -ES Location: foot  -ES Primary Wound Type: Incision  -ES      Row Name             Wound 10/17/23 0546 Bilateral perirectal MASD (Moisture associated skin damage)    Wound - Properties Group Placement Date: 10/17/23  -AS Placement Time: 0546  -AS Present on Original Admission: N  -AS Side: Bilateral  -AS Location: perirectal  -AS Primary Wound Type: MASD  -AS    Retired Wound - Properties Group Placement Date: 10/17/23  -AS Placement Time: 0546  -AS Present on Original Admission: N  -AS Side: Bilateral  -AS Location: perirectal  -AS Primary Wound Type: MASD  -AS    [facility] Date first assessed: 10/17/23  -AS Time first assessed: 0546  -AS Present on Original Admission: N  -AS Side: Bilateral  -AS Location: perirectal  -AS Primary Wound Type: MASD  -AS      Row Name 10/24/23 1150          Plan of Care Review    Plan of Care Reviewed With patient (P)   -ZT     Outcome Evaluation Pt presents in a deconditioned state secondary to recent hospital stay. He requires assistance to move around in bed and cannot tolerate much movement at all due to BLE pain and decreased mobility. He continues to require skilled PT services to address these deficits to improve his independence. (P)   -ZT       Row Name 10/24/23 1150          Positioning and Restraints    Pre-Treatment Position in bed (P)   -ZT     Post Treatment Position bed (P)   -ZT     In Bed call light within reach;encouraged to call for assist;exit alarm on (P)   -ZT       Row Name 10/24/23 2140          Therapy Assessment/Plan (PT)    Rehab Potential (PT) fair, will monitor progress closely (P)   -ZT     Criteria for Skilled Interventions Met (PT) yes;skilled treatment is necessary (P)   -ZT     Therapy Frequency (PT) 3 times/wk (P)   -ZT     Predicted Duration of Therapy Intervention (PT) 10 days (P)   -ZT     Problem List (PT) problems related  to;balance;mobility;strength (P)   -ZT     Activity Limitations Related to Problem List (PT) unable to ambulate safely;unable to transfer safely (P)   -ZT       Row Name 10/24/23 1150          Progress Summary (PT)    Progress Toward Functional Goals (PT) progress toward functional goals is fair (P)   -ZT       Row Name 10/24/23 1150          Therapy Plan Review/Discharge Plan (PT)    Therapy Plan Review (PT) evaluation/treatment results reviewed;care plan/treatment goals reviewed;participants included;patient (P)   -ZT       Row Name 10/24/23 1150          Physical Therapy Goals    Bed Mobility Goal Selection (PT) bed mobility, PT goal 1 (P)   -ZT     Transfer Goal Selection (PT) transfer, PT goal 1 (P)   -ZT     Gait Training Goal Selection (PT) gait training, PT goal 1 (P)   -ZT     Strength Goal Selection (PT) strength, PT goal 1 (P)   -ZT       Row Name 10/24/23 1150          Bed Mobility Goal 1 (PT)    Activity/Assistive Device (Bed Mobility Goal 1, PT) bed mobility activities, all (P)   -ZT     [place] Level/Cues Needed (Bed Mobility Goal 1, PT) independent (P)   -ZT     Time Frame (Bed Mobility Goal 1, PT) 10 days (P)   -ZT     Progress/Outcomes (Bed Mobility Goal 1, PT) goal not met;continuing progress toward goal (P)   -ZT       Row Name 10/24/23 1150          Transfer Goal 1 (PT)    Activity/Assistive Device (Transfer Goal 1, PT) transfers, all (P)   -ZT     [place] Level/Cues Needed (Transfer Goal 1, PT) independent (P)   -ZT     Time Frame (Transfer Goal 1, PT) 10 days (P)   -ZT     Progress/Outcome (Transfer Goal 1, PT) goal not met;continuing progress toward goal (P)   -ZT       Row Name 10/24/23 1150          Gait Training Goal 1 (PT)    Activity/Assistive Device (Gait Training Goal 1, PT) gait (walking locomotion);assistive device use (P)   -ZT     [place] Level (Gait Training Goal 1, PT) independent (P)   -ZT     Distance (Gait Training Goal 1, PT) 2 (P)   -ZT     Time Frame (Gait  Training Goal 1, PT) 10 days (P)   -ZT     Progress/Outcome (Gait Training Goal 1, PT) goal not met;continuing progress toward goal (P)   -ZT       Row Name 10/24/23 1150          Strength Goal 1 (PT)    Strength Goal 1 (PT) Patient will demonstrate improvement in gross bilateral lower extremity strength to 3+/5 (P)   -ZT     Time Frame (Strength Goal 1, PT) 10 days (P)   -ZT     Progress/Outcome (Strength Goal 1, PT) goal met (P)   -ZT               User Key  (r) = Recorded By, (t) = Taken By, (c) = Cosigned By      Initials Name Provider Type    AS Angelina Alfredo, RN Registered Nurse    Katlyn Garcia RN Registered Nurse    Oscar Ward PT Student PT Student                    Physical Therapy Education       Title: PT OT SLP Therapies (Done)       Topic: Physical Therapy (Done)       Point: Mobility training (Done)       Learning Progress Summary             Patient Acceptance, E,TB, VU by ZT at 10/24/2023 1157    Acceptance, E, VU,NR by RASHARD at 9/24/2023 0604    Acceptance, E, VU,DU by RF at 9/18/2023 1537    Acceptance, E, VU,DU by RF at 9/17/2023 1515    Acceptance, E, VU,DU by RF at 9/16/2023 1804    Acceptance, E, VU by AV at 9/13/2023 1059    Acceptance, E,TB, VU by  at 9/12/2023 1308                         Point: Home exercise program (Done)       Learning Progress Summary             Patient Acceptance, E, VU,NR by RASHARD at 9/24/2023 0604    Acceptance, E, VU,DU by RF at 9/18/2023 1537    Acceptance, E, VU,DU by RF at 9/17/2023 1515    Acceptance, E, VU,DU by RF at 9/16/2023 1804    Acceptance, E, VU by AV at 9/13/2023 1059    Acceptance, E,TB, VU by  at 9/12/2023 1308                         Point: Body mechanics (Done)       Learning Progress Summary             Patient Acceptance, E,TB, VU by ZT at 10/24/2023 1157    Acceptance, E, VU,NR by RASHARD at 9/24/2023 0604    Acceptance, E, VU,DU by RF at 9/18/2023 1537    Acceptance, E, VU,DU by RF at 9/17/2023 1515    Acceptance, E, VU,DU by RF at  9/16/2023 1804    Acceptance, E, VU by AV at 9/13/2023 1059    Acceptance, E,TB, VU by  at 9/12/2023 1308                         Point: Precautions (Done)       Learning Progress Summary             Patient Acceptance, E,TB, VU by ZT at 10/24/2023 1157    Acceptance, E, VU,NR by RASHARD at 9/24/2023 0604    Acceptance, E, VU,DU by RF at 9/18/2023 1537    Acceptance, E, VU,DU by RF at 9/17/2023 1515    Acceptance, E, VU,DU by RF at 9/16/2023 1804    Acceptance, E, VU by AV at 9/13/2023 1059    Acceptance, E,TB, VU by  at 9/12/2023 1308                                         User Key       Initials Effective Dates Name Provider Type Discipline    RASHARD 06/16/21 -  Selena Lindquist, RN Registered Nurse Nurse    AV 06/16/21 -  Uvaldo Christine OT Occupational Therapist OT     01/19/22 -  Karl Baker, RN Registered Nurse Nurse     08/16/23 -  Gamal Simmons, PT Student PT Student PT     09/05/23 -  Oscar Shields, PT Student PT Student PT                  PT Recommendation and Plan  Anticipated Discharge Disposition (PT): (P) inpatient rehabilitation facility  Planned Therapy Interventions (PT): (P) balance training, bed mobility training, gait training, stair training, strengthening, transfer training  Therapy Frequency (PT): (P) 3 times/wk  Progress Summary (PT)  Progress Toward Functional Goals (PT): (P) progress toward functional goals is fair  Daily Progress Summary (PT): Pt presents in a deconditioned state secondary to recent hospital stay. He has LE pain that makes it difficult for him to move in bed. He can only tolerate small amounts of exercise before he requires a break. He continues to require skilled PT services to address these deficits so that he can safely perform ADL's.  Plan of Care Reviewed With: (P) patient  Outcome Evaluation: (P) Pt presents in a deconditioned state secondary to recent hospital stay. He requires assistance to move around in bed and cannot tolerate much movement at all  due to BLE pain and decreased mobility. He continues to require skilled PT services to address these deficits to improve his independence.   Outcome Measures       Row Name 10/24/23 1100             How much help from another person do you currently need...    Turning from your back to your side while in flat bed without using bedrails? 3 (P)   -ZT      Moving from lying on back to sitting on the side of a flat bed without bedrails? 3 (P)   -ZT      Moving to and from a bed to a chair (including a wheelchair)? 2 (P)   -ZT      Standing up from a chair using your arms (e.g., wheelchair, bedside chair)? 2 (P)   -ZT      Climbing 3-5 steps with a railing? 1 (P)   -ZT      To walk in hospital room? 1 (P)   -ZT      AM-PAC 6 Clicks Score (PT) 12 (P)   -ZT      Highest level of mobility 4 --> Transferred to chair/commode (P)   -ZT                User Key  (r) = Recorded By, (t) = Taken By, (c) = Cosigned By      Initials Name Provider Type    ZT Oscar Shields, PT Student PT Student                     Time Calculation:    PT Charges       Row Name 10/24/23 1150             Time Calculation    PT Received On 10/24/23 (P)   -ZT      PT Goal Re-Cert Due Date 23 (P)   -ZT         Untimed Charges    PT Eval/Re-eval Minutes 25 (P)   -ZT         Total Minutes    Untimed Charges Total Minutes 25 (P)   -ZT       Total Minutes 25 (P)   -ZT                User Key  (r) = Recorded By, (t) = Taken By, (c) = Cosigned By      Initials Name Provider Type    ZT Oscar Shields, PT Student PT Student                      PT G-Codes  Outcome Measure Options: AM-PAC 6 Clicks Daily Activity (OT), Optimal Instrument  AM-PAC 6 Clicks Score (PT): (P) 12  AM-PAC 6 Clicks Score (OT): 15    Oscar Shields PT Student  10/24/2023      Electronically signed by Merlin Rao PT at 10/24/23 1317          Occupational Therapy Notes (most recent note)        Uvaldo Christine, OT at 10/17/23 1036          Patient Name: Jose Shaikh  :  1960    MRN: [id number]                              Today's Date: 10/17/2023       Admit Date: 9/10/2023    Visit Dx:     ICD-10-CM ICD-9-CM   1. Generalized weakness  R53.1 780.79   2. Hyponatremia  E87.1 276.1   3. Difficulty in walking  R26.2 719.7   4. Decreased activities of daily living (ADL)  Z78.9 V49.89   5. Difficulty walking  R26.2 719.7     Patient Active Problem List   Diagnosis    Diabetic ulcer of left heel associated with type 2 DM    Acute osteomyelitis of left calcaneus     Diabetic ulcer of left heel associated with type 2 DM    Diabetic ulcer of right midfoot associated with type 2 DM    Paroxysmal atrial fibrillation    Essential hypertension    Hyperlipidemia LDL goal <100    Cellulitis and abscess of foot    High alkaline phosphatase level    Osteomyelitis    Onychomycosis    Onychocryptosis    Foot pain, bilateral    Osteomyelitis of foot, right, acute    Cellulitis of right foot    Type 2 diabetes mellitus, with long-term current use of insulin    Class 3 severe obesity due to excess calories with serious comorbidity and body mass index (BMI) of 45.0 to 49.9 in adult    Anxiety disorder, unspecified    Claustrophobia    Dependence on wheelchair    Depression, unspecified    Long term (current) use of anticoagulants    Long term (current) use of oral hypoglycemic drugs    Wound of foot    Non-prs chronic ulcer oth prt r foot limited to brkdwn skin    Orthostatic hypotension    Other chronic osteomyelitis, right ankle and foot    Personal history of nicotine dependence    Thrombocytopenia, unspecified    Unspecified open wound, right foot, initial encounter    Diabetic foot infection    Subacute osteomyelitis of right foot    Right foot pain    Sepsis    Onychomycosis    Foot pain, left    Impaired mobility and ADLs    Absence of toe of right foot    Corns and callosity    Disability of walking    Fracture    Limb swelling    Polyneuropathy    Pressure ulcer, stage 1    Shortness of  breath    Generalized weakness     Past Medical History:   Diagnosis Date    Absence of toe of right foot     Acute osteomyelitis of left calcaneus  8/18/2021    Anxiety and depression     Arthritis     Claustrophobia     Corns and callus     Diabetic ulcer of left heel associated with type 2 DM 8/18/2021    Diabetic ulcer of left heel associated with type 2 DM 7/6/2021    Diabetic ulcer of right midfoot associated with type 2 DM 8/18/2021    Difficulty walking     Essential hypertension 8/31/2021    Hammertoe     Hyperlipidemia LDL goal <100 8/31/2021    Ingrown toenail     Obesity     Paroxysmal atrial fibrillation 8/31/2021    Polyneuropathy     Pressure ulcer, stage 1     Tinea unguium     Type 2 diabetes mellitus with polyneuropathy      Past Surgical History:   Procedure Laterality Date    CYST REMOVAL      center of back; benign    INCISION AND DRAINAGE ABSCESS      back    INCISION AND DRAINAGE LEG Right 12/10/2021    Procedure: INCISION AND DRAINAGE LOWER EXTREMITY;  Surgeon: Ash Leyva DPM;  Location: [facility];  Service: Podiatry;  Laterality: Right;    OTHER SURGICAL HISTORY      Surgical clips left foot    TOE SURGERY Right     Removal of 5th toe    TRANS METATARSAL AMPUTATION Right 12/2/2021    Procedure: AMPUTATION TRANS METATARSAL;  Surgeon: Ash Leyva DPM;  Location: [facility] OR;  Service: Podiatry;  Laterality: Right;    WRIST SURGERY Left     repair of injury      General Information       Row Name 10/17/23 1032          OT Time and Intention    Document Type therapy note (daily note)  -AV     Mode of Treatment individual therapy;occupational therapy  -AV       Row Name 10/17/23 1032          General Information    Existing Precautions/Restrictions fall  -AV       Row Name 10/17/23 1032          Cognition    Orientation Status (Cognition) --  Able to perform therapeutic exercises with minimal cues and demonstration.  -AV       Row Name 10/17/23 1032           Safety Issues, Functional Mobility    Impairments Affecting Function (Mobility) balance;endurance/activity tolerance;strength  -AV               User Key  (r) = Recorded By, (t) = Taken By, (c) = Cosigned By      Initials Name Provider Type    Uvaldo Hardy OT Occupational Therapist                     Mobility/ADL's    No documentation.                  Obj/Interventions       Row Name 10/17/23 1032          Shoulder (Therapeutic Exercise)    Shoulder Strengthening (Therapeutic Exercise) bilateral;flexion;horizontal aBduction/aDduction;3 lb free weight;20 repititions  -AV       Row Name 10/17/23 1032          Elbow/Forearm (Therapeutic Exercise)    Elbow/Forearm Strengthening (Therapeutic Exercise) bilateral;flexion;extension;supination;pronation;3 lb free weight;20 repititions  -AV       Row Name 10/17/23 1032          Motor Skills    Therapeutic Exercise shoulder;elbow/forearm  Performed in high Fowlers/on room air  -AV               User Key  (r) = Recorded By, (t) = Taken By, (c) = Cosigned By      Initials Name Provider Type    Uvaldo Hardy OT Occupational Therapist                   Goals/Plan    No documentation.                  Clinical Impression       Row Name 10/17/23 1033          Pain Scale: FACES Pre/Post-Treatment    Pain: FACES Scale, Pretreatment 0-->no hurt  -AV     Posttreatment Pain Rating 0-->no hurt  -AV       Row Name 10/17/23 1033          Plan of Care Review    Progress improving  Able to increase resistance to 3 pounds  -AV     Outcome Evaluation Patient performed upper extremity therapeutic exercises with 3 pound resistance to improve endurance/activity tolerance needed to support ADLs.  Continued OT indicated to remediate/compensate for deficits to maximize independence.  -AV       Row Name 10/17/23 1033          Vital Signs    O2 Delivery Pre Treatment room air  -AV     O2 Delivery Intra Treatment room air  -AV     O2 Delivery Post Treatment room air  -AV               User  Key  (r) = Recorded By, (t) = Taken By, (c) = Cosigned By      Initials Name Provider Type    Uvaldo Hardy OT Occupational Therapist                   Outcome Measures       Row Name 10/17/23 1034          How much help from another is currently needed...    Putting on and taking off regular lower body clothing? 1  -AV     Bathing (including washing, rinsing, and drying) 2  -AV     Toileting (which includes using toilet bed pan or urinal) 2  -AV     Putting on and taking off regular upper body clothing 3  -AV     Taking care of personal grooming (such as brushing teeth) 3  -AV     Eating meals 4  -AV     AM-PAC 6 Clicks Score (OT) 15  -AV       Row Name 10/17/23 1034          Optimal Instrument    Bending/Stooping 5  -AV     Standing 5  -AV     Reaching 1  -AV               User Key  (r) = Recorded By, (t) = Taken By, (c) = Cosigned By      Initials Name Provider Type    Uvaldo Hardy OT Occupational Therapist                    Occupational Therapy Education       Title: PT OT SLP Therapies (Done)       Topic: Occupational Therapy (Done)       Point: ADL training (Done)       Description:   Instruct learner(s) on proper safety adaptation and remediation techniques during self care or transfers.   Instruct in proper use of assistive devices.                  Learning Progress Summary             Patient Acceptance, E, VU,NR by RASHARD at 9/24/2023 0604    Acceptance, E, VU,DU by RF at 9/18/2023 1537    Acceptance, E, VU,DU by RF at 9/17/2023 1515    Acceptance, E, VU,DU by RF at 9/16/2023 1804    Acceptance, E, VU by AV at 9/13/2023 1059                         Point: Home exercise program (Done)       Description:   Instruct learner(s) on appropriate technique for monitoring, assisting and/or progressing therapeutic exercises/activities.                  Learning Progress Summary             Patient Acceptance, E, VU,NR by RASHARD at 9/24/2023 0604    Acceptance, E, VU,DU by RF at 9/18/2023 1537    Acceptance, E,  VU,DU by RF at 9/17/2023 1515    Acceptance, E, VU,DU by RF at 9/16/2023 1804    Acceptance, E, VU by AV at 9/13/2023 1059                         Point: Precautions (Done)       Description:   Instruct learner(s) on prescribed precautions during self-care and functional transfers.                  Learning Progress Summary             Patient Acceptance, E, VU,NR by RASHARD at 9/24/2023 0604    Acceptance, E, VU,DU by RF at 9/18/2023 1537    Acceptance, E, VU,DU by RF at 9/17/2023 1515    Acceptance, E, VU,DU by RF at 9/16/2023 1804    Acceptance, E, VU by AV at 9/13/2023 1059                         Point: Body mechanics (Done)       Description:   Instruct learner(s) on proper positioning and spine alignment during self-care, functional mobility activities and/or exercises.                  Learning Progress Summary             Patient Acceptance, E, VU,NR by  at 9/24/2023 0604    Acceptance, E, VU,DU by RF at 9/18/2023 1537    Acceptance, E, VU,DU by RF at 9/17/2023 1515    Acceptance, E, VU,DU by RF at 9/16/2023 1804    Acceptance, E, VU by AV at 9/13/2023 1059                                         User Key       Initials Effective Dates Name Provider Type Discipline     06/16/21 -  Selena Lindquist, RN Registered Nurse Nurse    AV 06/16/21 -  Uvaldo Christine OT Occupational Therapist OT     01/19/22 -  Karl Baker, RN Registered Nurse Nurse                  OT Recommendation and Plan  Planned Therapy Interventions (OT): activity tolerance training, BADL retraining, functional balance retraining, occupation/activity based interventions, patient/caregiver education/training, transfer/mobility retraining  Therapy Frequency (OT): 5 times/wk  Plan of Care Review  Plan of Care Reviewed With: patient  Progress: improving (Able to increase resistance to 3 pounds)  Outcome Evaluation: Patient performed upper extremity therapeutic exercises with 3 pound resistance to improve endurance/activity tolerance needed to  support ADLs.  Continued OT indicated to remediate/compensate for deficits to maximize independence.     Time Calculation:   Evaluation Complexity (OT)  Review Occupational Profile/Medical/Therapy History Complexity: expanded/moderate complexity  Assessment, Occupational Performance/Identification of Deficit Complexity: 3-5 performance deficits  Clinical Decision Making Complexity (OT): problem focused assessment/low complexity  Overall Complexity of Evaluation (OT): low complexity     Time Calculation- OT       Row Name 10/17/23 1035             Time Calculation- OT    OT Received On 10/17/23  -AV      OT Goal Re-Cert Due Date 10/22/23  -AV         Timed Charges    52153 - OT Therapeutic Exercise Minutes 10  -AV         Total Minutes    Timed Charges Total Minutes 10  -AV       Total Minutes 10  -AV                User Key  (r) = Recorded By, (t) = Taken By, (c) = Cosigned By      Initials Name Provider Type    AV Uvaldo Christine OT Occupational Therapist                  Therapy Charges for Today       Code Description Service Date Service Provider Modifiers Qty    54202279221 HC OT THER PROC EA 15 MIN 10/17/2023 Uvaldo Christine OT GO 1                 Uvaldo Christine OT  10/17/2023    Electronically signed by Uvaldo Christine OT at 10/17/23 1036

## 2023-10-27 NOTE — PROGRESS NOTES
"    Patient Care Team:  Delia Cervantes APRN as PCP - General (Nurse Practitioner)  Kami Garcia APRN as Nurse Practitioner (Endocrinology)  Ella Braden RN as Ambulatory  (Population Health)    Chief complaint ***    Subjective     Patient is a 63 y.o. male presents with ***. Onset of symptoms was {onset:84743}.  Symptoms are associated with ***.  Symptoms are aggravated by ***.   Symptoms improve with ***. Severity *** Context *** Quality ***    Review of Systems   {Gen ROS:34738}    History  {History:12918}    Objective     Vital Signs  Temp:  [97.3 °F (36.3 °C)-98.4 °F (36.9 °C)] 97.3 °F (36.3 °C)  Heart Rate:  [77-88] 81  Resp:  [18] 18  BP: (107-120)/(54-79) 113/58    Physical Exam:    {General Physical Exam:81541}    Results Review:    {Results Review:44351::\"I reviewed the patient's new clinical results.\"}    Assessment & Plan       Generalized weakness    Diabetic ulcer of left heel associated with type 2 DM    Paroxysmal atrial fibrillation    Essential hypertension    Foot pain, bilateral    Type 2 diabetes mellitus, with long-term current use of insulin    Class 3 severe obesity due to excess calories with serious comorbidity and body mass index (BMI) of 45.0 to 49.9 in adult    Dependence on wheelchair      **    I discussed the patients findings and my recommendations with {discussed with:19637::\"patient\"}.     Delfina Xie RN  10/27/23  09:55 EDT    Time: {Time spent:657290605}      "

## 2023-10-27 NOTE — PLAN OF CARE
Goal Outcome Evaluation:  Plan of Care Reviewed With: patient        Progress: no change  Outcome Evaluation: patient is alert and oriented. medicated with prn medication. wound care performed per orders. blood glucose monitored. no other changes at this time.

## 2023-10-27 NOTE — PROGRESS NOTES
[facility]   Hospitalist Progress Note  Date: 10/27/2023  Patient Name: Jose Shaikh  : 1960  MRN: [id number]  Date of admission: 9/10/2023      Subjective   Subjective     Chief Complaint: Follow-up weakness    Summary:Jose Shaikh is a 63 y.o. male  initially hospitalized on 9/10/2023, prolonged hospitalization for treatment of management of generalized weakness, deconditioning, with acute issues of diarrhea, C. difficile negative.  Diarrhea improved.  Had small hematoma after ambulating on his foot.  Unfortunately with exhaustive efforts to try to place this gentleman after 39 days of hospitalization, we are unable to find a facility that will accept him.  He has no further acute needs or requirements for inpatient monitoring and management, his labs and vitals are stable, he is tolerating oral intake, and has been refusing turns and repositionings and other interventions with nursing staff despite recommendations.  Patient discharged in hemodynamically stable addition on 10/19/2023 to follow-up with PCP within 1 week.  Unfortunately we cannot solve all of his social issues during this hospitalization due to lack of resources and participation from the patient's perspective despite all our efforts.  Patient has a financial means of making arrangements at home.  Patient appealed his discharge.  Lost his appeal.  LCD: 10/20/23, PFL beginning: 10/21/23 @ 12pm.  Discharge planning continues to work on inpatient placement.    Interval Followup: Patient seen this afternoon and resting comfortably.  No new issues per staff.  Still awaiting inpatient placement.  Discharge planning coordinating.    Review of Systems  Constitutional: Negative for fatigue and fever.   HENT: Negative for sore throat and trouble swallowing.    Eyes: Negative for pain and discharge.   Respiratory: Negative for cough and shortness of breath.    Cardiovascular: Negative for chest pain and palpitations.   Gastrointestinal:  Negative for abdominal pain, nausea and vomiting.   Endocrine: Negative for cold intolerance and heat intolerance.   Genitourinary: Negative for difficulty urinating and dysuria.   Musculoskeletal: Negative for back pain and neck stiffness.   Skin: Negative for color change and rash.   Neurological: Negative for syncope and headaches.   Hematological: Negative for adenopathy.   Psychiatric/Behavioral: Negative for confusion and hallucinations.    Objective   Objective     Vitals:   Temp:  [97.3 °F (36.3 °C)-98.4 °F (36.9 °C)] 97.7 °F (36.5 °C)  Heart Rate:  [78-88] 80  Resp:  [18] 18  BP: (108-120)/(58-79) 108/60  Physical Exam   General: well-developed appearing stated age obese in no acute distress  HEENT: Normocephalic atraumatic moist membranes   Pulmonary:symmetric chest expansion, unlabored  Gastrointestinal:  nondistended   Skin: Clean dry   Neuro: grossly no sensorimotor deficits appreciated bilateral upper and lower extremities  Psych: Patient is calm not responding to internal stimuli  : No King catheter     Result Review    Result Review:  I have personally reviewed these results and agree with these findings:  [x]  Laboratory  LAB RESULTS:                                    Brief Urine Lab Results  (Last result in the past 365 days)        Color   Clarity   Blood   Leuk Est   Nitrite   Protein   CREAT   Urine HCG        09/11/23 2200 Dark Yellow   Clear   Negative   Negative   Negative   Negative                 Microbiology Results (last 10 days)       Procedure Component Value - Date/Time    Enteric Bacterial Panel - Stool, Per Rectum [[id number]]  (Normal) Collected: 10/17/23 2026    Lab Status: Final result Specimen: Stool from Per Rectum Updated: 10/18/23 0856     Salmonella Not Detected     Campylobacter Not Detected     Shigella/Enteroinvasive E. coli (EIEC) Not Detected     Shiga-like toxin-producing E. coli (STEC) stx1/stx2 Not Detected            [x]  Microbiology  []  Radiology  No radiology  results for the last 7 days    []  EKG/Telemetry   []  Cardiology/Vascular   []  Pathology  []  Old records  [x]  Other:  Scheduled Meds:ammonium lactate, , Topical, Daily  apixaban, 5 mg, Oral, Q12H  atorvastatin, 10 mg, Oral, Nightly  bacitracin, 1 application , Topical, BID  cholestyramine, 1 packet, Oral, Q12H  empagliflozin, 10 mg, Oral, Daily  famotidine, 40 mg, Oral, Daily  insulin detemir, 55 Units, Subcutaneous, Nightly  insulin detemir, 60 Units, Subcutaneous, Daily  insulin lispro, 4-24 Units, Subcutaneous, 4x Daily AC & at Bedtime  Insulin Lispro, 5 Units, Subcutaneous, TID With Meals  lisinopril, 2.5 mg, Oral, Q24H  pregabalin, 75 mg, Oral, TID  saccharomyces boulardii, 250 mg, Oral, BID  sodium chloride, 10 mL, Intravenous, Q12H  vitamin B-12, 1,000 mcg, Oral, Daily      Continuous Infusions:   PRN Meds:.  acetaminophen    baclofen    senna-docusate sodium **AND** polyethylene glycol **AND** bisacodyl **AND** bisacodyl    dextrose    dextrose    diphenoxylate-atropine    glucagon (human recombinant)    HYDROcodone-acetaminophen    hydrocortisone-bacitracin-zinc oxide-nystatin    ibuprofen    ondansetron    ondansetron ODT    simethicone    sodium chloride    sodium chloride      Assessment & Plan   Assessment / Plan     Assessment/Plan:  Assessment:  Generalized weakness  Deconditioning  Type 2 diabetes mellitus  Essential hypertension  Chronic paroxysmal atrial fibrillation on Eliquis  Obesity (BMI: 42.88)  Debility with wheelchair dependence  Severe degenerative joint disease of lower extremities  Chronic wound  Thrombocytopenia     Plan:  Labs and imaging reviewed  Patient was discharged initially on 10/19/2023, patient appealed discharge, lost appeal, initial discharge order was canceled by accident and reentered on 10/21/2023, patient remains discharged as of 10/19/2023 but refuses to leave.  PFL beginning 10/21/2023 at 12 PM.  Continue to work with  to determine next  steps  Encourage patient to mobilize with therapists and staff  Continue blood sugar control with current regimen Levemir, Humalog and titrate as needed  Patient can be discharged home at any time; discharge order remains  Continue Florastor  Continue as needed Imodium and scheduled Questran  Continue other medications as ordered  PT/OT/ consulted and following         Discussed plan with bedside RN and .    DVT prophylaxis:  Medical DVT prophylaxis orders are present.    CODE STATUS:   Code Status (Patient has no pulse and is not breathing): CPR (Attempt to Resuscitate)  Medical Interventions (Patient has pulse or is breathing): Full Support

## 2023-10-28 LAB
GLUCOSE BLDC GLUCOMTR-MCNC: 149 MG/DL (ref 70–99)
GLUCOSE BLDC GLUCOMTR-MCNC: 193 MG/DL (ref 70–99)
GLUCOSE BLDC GLUCOMTR-MCNC: 207 MG/DL (ref 70–99)
GLUCOSE BLDC GLUCOMTR-MCNC: 214 MG/DL (ref 70–99)

## 2023-10-28 PROCEDURE — 99231 SBSQ HOSP IP/OBS SF/LOW 25: CPT | Performed by: FAMILY MEDICINE

## 2023-10-28 PROCEDURE — 63710000001 INSULIN LISPRO (HUMAN) PER 5 UNITS: Performed by: INTERNAL MEDICINE

## 2023-10-28 PROCEDURE — 63710000001 INSULIN DETEMIR PER 5 UNITS: Performed by: FAMILY MEDICINE

## 2023-10-28 PROCEDURE — 82948 REAGENT STRIP/BLOOD GLUCOSE: CPT

## 2023-10-28 RX ADMIN — IBUPROFEN 400 MG: 400 TABLET, FILM COATED ORAL at 06:21

## 2023-10-28 RX ADMIN — APIXABAN 5 MG: 5 TABLET, FILM COATED ORAL at 08:30

## 2023-10-28 RX ADMIN — BACITRACIN 0.9 G: 500 OINTMENT TOPICAL at 21:41

## 2023-10-28 RX ADMIN — BACLOFEN 10 MG: 10 TABLET ORAL at 03:07

## 2023-10-28 RX ADMIN — CHOLESTYRAMINE 1 PACKET: 4 POWDER, FOR SUSPENSION ORAL at 21:39

## 2023-10-28 RX ADMIN — BACITRACIN 0.9 G: 500 OINTMENT TOPICAL at 08:36

## 2023-10-28 RX ADMIN — HYDROCODONE BITARTRATE AND ACETAMINOPHEN 1 TABLET: 7.5; 325 TABLET ORAL at 13:49

## 2023-10-28 RX ADMIN — INSULIN LISPRO 8 UNITS: 100 INJECTION, SOLUTION INTRAVENOUS; SUBCUTANEOUS at 21:39

## 2023-10-28 RX ADMIN — BACLOFEN 10 MG: 10 TABLET ORAL at 11:21

## 2023-10-28 RX ADMIN — CHOLESTYRAMINE 1 PACKET: 4 POWDER, FOR SUSPENSION ORAL at 08:29

## 2023-10-28 RX ADMIN — FAMOTIDINE 40 MG: 20 TABLET, FILM COATED ORAL at 08:29

## 2023-10-28 RX ADMIN — Medication 250 MG: at 21:40

## 2023-10-28 RX ADMIN — INSULIN LISPRO 5 UNITS: 100 INJECTION, SOLUTION INTRAVENOUS; SUBCUTANEOUS at 11:21

## 2023-10-28 RX ADMIN — CYANOCOBALAMIN TAB 500 MCG 1000 MCG: 500 TAB at 08:29

## 2023-10-28 RX ADMIN — INSULIN LISPRO 5 UNITS: 100 INJECTION, SOLUTION INTRAVENOUS; SUBCUTANEOUS at 08:30

## 2023-10-28 RX ADMIN — Medication 250 MG: at 08:29

## 2023-10-28 RX ADMIN — INSULIN DETEMIR 60 UNITS: 100 INJECTION, SOLUTION SUBCUTANEOUS at 21:39

## 2023-10-28 RX ADMIN — INSULIN LISPRO 5 UNITS: 100 INJECTION, SOLUTION INTRAVENOUS; SUBCUTANEOUS at 17:21

## 2023-10-28 RX ADMIN — HYDROCODONE BITARTRATE AND ACETAMINOPHEN 1 TABLET: 7.5; 325 TABLET ORAL at 21:40

## 2023-10-28 RX ADMIN — IBUPROFEN 400 MG: 400 TABLET, FILM COATED ORAL at 21:40

## 2023-10-28 RX ADMIN — HYDROCODONE BITARTRATE AND ACETAMINOPHEN 1 TABLET: 7.5; 325 TABLET ORAL at 06:21

## 2023-10-28 RX ADMIN — APIXABAN 5 MG: 5 TABLET, FILM COATED ORAL at 21:39

## 2023-10-28 RX ADMIN — INSULIN LISPRO 4 UNITS: 100 INJECTION, SOLUTION INTRAVENOUS; SUBCUTANEOUS at 08:30

## 2023-10-28 RX ADMIN — PREGABALIN 75 MG: 75 CAPSULE ORAL at 16:35

## 2023-10-28 RX ADMIN — BACLOFEN 10 MG: 10 TABLET ORAL at 21:40

## 2023-10-28 RX ADMIN — INSULIN LISPRO 8 UNITS: 100 INJECTION, SOLUTION INTRAVENOUS; SUBCUTANEOUS at 17:21

## 2023-10-28 RX ADMIN — PREGABALIN 75 MG: 75 CAPSULE ORAL at 21:40

## 2023-10-28 RX ADMIN — IBUPROFEN 400 MG: 400 TABLET, FILM COATED ORAL at 13:49

## 2023-10-28 RX ADMIN — INSULIN DETEMIR 60 UNITS: 100 INJECTION, SOLUTION SUBCUTANEOUS at 08:30

## 2023-10-28 RX ADMIN — EMPAGLIFLOZIN 10 MG: 10 TABLET, FILM COATED ORAL at 08:31

## 2023-10-28 RX ADMIN — PREGABALIN 75 MG: 75 CAPSULE ORAL at 08:29

## 2023-10-28 RX ADMIN — ATORVASTATIN CALCIUM 10 MG: 10 TABLET, FILM COATED ORAL at 21:39

## 2023-10-28 RX ADMIN — Medication: at 08:36

## 2023-10-28 NOTE — PLAN OF CARE
Goal Outcome Evaluation:  Plan of Care Reviewed With: patient        Progress: no change  Outcome Evaluation: Pt continues to do PT exercises in bed without prompting. Pt given PRN pain meds, see MAR. Skin and wound care completed per orders. Pt in no apparent distress at this time, denies any needs currently, call light in reach.

## 2023-10-28 NOTE — PROGRESS NOTES
[facility]   Hospitalist Progress Note  Date: 10/28/2023  Patient Name: Jose Shaikh  : 1960  MRN: [id number]  Date of admission: 9/10/2023      Subjective   Subjective     Chief Complaint: Follow-up weakness    Summary:Jose Shaikh is a 63 y.o. male  initially hospitalized on 9/10/2023, prolonged hospitalization for treatment of management of generalized weakness, deconditioning, with acute issues of diarrhea, C. difficile negative.  Diarrhea improved.  Had small hematoma after ambulating on his foot.  Unfortunately with exhaustive efforts to try to place this gentleman after 39 days of hospitalization, we are unable to find a facility that will accept him.  He has no further acute needs or requirements for inpatient monitoring and management, his labs and vitals are stable, he is tolerating oral intake, and has been refusing turns and repositionings and other interventions with nursing staff despite recommendations.  Patient discharged in hemodynamically stable addition on 10/19/2023 to follow-up with PCP within 1 week.  Unfortunately we cannot solve all of his social issues during this hospitalization due to lack of resources and participation from the patient's perspective despite all our efforts.  Patient has a financial means of making arrangements at home.  Patient appealed his discharge.  Lost his appeal.  LCD: 10/20/23, PFL beginning: 10/21/23 @ 12pm.  Discharge planning continues to work on inpatient placement.    Interval Followup: Patient seen this afternoon and resting comfortably.  No new issues per staff.  Fasting blood sugars slowly improving but still above goal. Still awaiting inpatient placement.  Discharge planning coordinating.    Review of Systems  Constitutional: Negative for fatigue and fever.   HENT: Negative for sore throat and trouble swallowing.    Eyes: Negative for pain and discharge.   Respiratory: Negative for cough and shortness of breath.    Cardiovascular: Negative  for chest pain and palpitations.   Gastrointestinal: Negative for abdominal pain, nausea and vomiting.   Endocrine: Negative for cold intolerance and heat intolerance.   Genitourinary: Negative for difficulty urinating and dysuria.   Musculoskeletal: Negative for back pain and neck stiffness.   Skin: Negative for color change and rash.   Neurological: Negative for syncope and headaches.   Hematological: Negative for adenopathy.   Psychiatric/Behavioral: Negative for confusion and hallucinations.    Objective   Objective     Vitals:   Temp:  [97.7 °F (36.5 °C)-97.9 °F (36.6 °C)] 97.7 °F (36.5 °C)  Heart Rate:  [78-85] 81  Resp:  [18] 18  BP: (108-150)/(53-63) 129/63  Physical Exam   General: well-developed appearing stated age obese in no acute distress  HEENT: Normocephalic atraumatic moist membranes   Pulmonary:symmetric chest expansion, unlabored  Gastrointestinal:  nondistended   Skin: Clean dry   Neuro: grossly no sensorimotor deficits appreciated bilateral upper and lower extremities  Psych: Patient is calm not responding to internal stimuli  : No King catheter     Result Review    Result Review:  I have personally reviewed these results and agree with these findings:  [x]  Laboratory  LAB RESULTS:                                    Brief Urine Lab Results  (Last result in the past 365 days)        Color   Clarity   Blood   Leuk Est   Nitrite   Protein   CREAT   Urine HCG        09/11/23 2200 Dark Yellow   Clear   Negative   Negative   Negative   Negative                 Microbiology Results (last 10 days)       ** No results found for the last 240 hours. **            [x]  Microbiology  []  Radiology  No radiology results for the last 7 days    []  EKG/Telemetry   []  Cardiology/Vascular   []  Pathology  []  Old records  [x]  Other:  Scheduled Meds:ammonium lactate, , Topical, Daily  apixaban, 5 mg, Oral, Q12H  atorvastatin, 10 mg, Oral, Nightly  bacitracin, 1 application , Topical, BID  cholestyramine, 1  packet, Oral, Q12H  empagliflozin, 10 mg, Oral, Daily  famotidine, 40 mg, Oral, Daily  insulin detemir, 60 Units, Subcutaneous, Nightly  [START ON 10/29/2023] insulin detemir, 65 Units, Subcutaneous, Daily  insulin lispro, 4-24 Units, Subcutaneous, 4x Daily AC & at Bedtime  Insulin Lispro, 5 Units, Subcutaneous, TID With Meals  lisinopril, 2.5 mg, Oral, Q24H  pregabalin, 75 mg, Oral, TID  saccharomyces boulardii, 250 mg, Oral, BID  sodium chloride, 10 mL, Intravenous, Q12H  vitamin B-12, 1,000 mcg, Oral, Daily      Continuous Infusions:   PRN Meds:.  acetaminophen    baclofen    senna-docusate sodium **AND** polyethylene glycol **AND** bisacodyl **AND** bisacodyl    dextrose    dextrose    diphenoxylate-atropine    glucagon (human recombinant)    HYDROcodone-acetaminophen    hydrocortisone-bacitracin-zinc oxide-nystatin    ibuprofen    ondansetron    ondansetron ODT    simethicone    sodium chloride    sodium chloride      Assessment & Plan   Assessment / Plan     Assessment/Plan:  Assessment:  Generalized weakness  Deconditioning  Type 2 diabetes mellitus  Essential hypertension  Chronic paroxysmal atrial fibrillation on Eliquis  Obesity (BMI: 42.88)  Debility with wheelchair dependence  Severe degenerative joint disease of lower extremities  Chronic wound  Thrombocytopenia     Plan:  Labs and imaging reviewed  Patient was discharged initially on 10/19/2023, patient appealed discharge, lost appeal, initial discharge order was canceled by accident and reentered on 10/21/2023, patient remains discharged as of 10/19/2023 but refuses to leave.  PFL beginning 10/21/2023 at 12 PM.  Continue to work with  to determine next steps  Encourage patient to mobilize with therapists and staff  Continue blood sugar control with current regimen Levemir twice daily and titrate to fasting blood sugar less than 130. This can be done as an outpatient.  Continue sliding scale Humalog   Patient can be discharged home at  any time; discharge order remains  Continue Florastor  Continue as needed Imodium and scheduled Questran  Continue other medications as ordered  PT/OT/ consulted and following         Discussed plan with bedside RN and .    DVT prophylaxis:  Medical DVT prophylaxis orders are present.    CODE STATUS:   Code Status (Patient has no pulse and is not breathing): CPR (Attempt to Resuscitate)  Medical Interventions (Patient has pulse or is breathing): Full Support

## 2023-10-28 NOTE — PLAN OF CARE
Goal Outcome Evaluation:  Plan of Care Reviewed With: patient        Progress: no change  Outcome Evaluation: patient is alert and oriented x4 and on room air. medicated with prn pain medication throughout shift for muscle spasms and pain in lower extremities. skin care and wound care performed per orders. blood glucose monitored. no new issues or concerns at this time.

## 2023-10-29 LAB
GLUCOSE BLDC GLUCOMTR-MCNC: 137 MG/DL (ref 70–99)
GLUCOSE BLDC GLUCOMTR-MCNC: 151 MG/DL (ref 70–99)
GLUCOSE BLDC GLUCOMTR-MCNC: 202 MG/DL (ref 70–99)
GLUCOSE BLDC GLUCOMTR-MCNC: 203 MG/DL (ref 70–99)

## 2023-10-29 PROCEDURE — 63710000001 INSULIN DETEMIR PER 5 UNITS: Performed by: FAMILY MEDICINE

## 2023-10-29 PROCEDURE — 82948 REAGENT STRIP/BLOOD GLUCOSE: CPT

## 2023-10-29 PROCEDURE — 63710000001 INSULIN LISPRO (HUMAN) PER 5 UNITS: Performed by: INTERNAL MEDICINE

## 2023-10-29 PROCEDURE — 99231 SBSQ HOSP IP/OBS SF/LOW 25: CPT | Performed by: FAMILY MEDICINE

## 2023-10-29 RX ADMIN — INSULIN LISPRO 4 UNITS: 100 INJECTION, SOLUTION INTRAVENOUS; SUBCUTANEOUS at 17:16

## 2023-10-29 RX ADMIN — BACITRACIN 0.9 G: 500 OINTMENT TOPICAL at 20:32

## 2023-10-29 RX ADMIN — Medication 250 MG: at 20:31

## 2023-10-29 RX ADMIN — PREGABALIN 75 MG: 75 CAPSULE ORAL at 08:16

## 2023-10-29 RX ADMIN — LISINOPRIL 2.5 MG: 2.5 TABLET ORAL at 08:16

## 2023-10-29 RX ADMIN — HYDROCODONE BITARTRATE AND ACETAMINOPHEN 1 TABLET: 7.5; 325 TABLET ORAL at 04:20

## 2023-10-29 RX ADMIN — INSULIN DETEMIR 65 UNITS: 100 INJECTION, SOLUTION SUBCUTANEOUS at 08:15

## 2023-10-29 RX ADMIN — BACLOFEN 10 MG: 10 TABLET ORAL at 13:43

## 2023-10-29 RX ADMIN — BACLOFEN 10 MG: 10 TABLET ORAL at 05:27

## 2023-10-29 RX ADMIN — CYANOCOBALAMIN TAB 500 MCG 1000 MCG: 500 TAB at 08:16

## 2023-10-29 RX ADMIN — INSULIN LISPRO 5 UNITS: 100 INJECTION, SOLUTION INTRAVENOUS; SUBCUTANEOUS at 08:15

## 2023-10-29 RX ADMIN — PREGABALIN 75 MG: 75 CAPSULE ORAL at 20:31

## 2023-10-29 RX ADMIN — BACITRACIN 0.9 G: 500 OINTMENT TOPICAL at 08:17

## 2023-10-29 RX ADMIN — INSULIN LISPRO 5 UNITS: 100 INJECTION, SOLUTION INTRAVENOUS; SUBCUTANEOUS at 17:16

## 2023-10-29 RX ADMIN — HYDROCODONE BITARTRATE AND ACETAMINOPHEN 1 TABLET: 7.5; 325 TABLET ORAL at 16:48

## 2023-10-29 RX ADMIN — BACLOFEN 10 MG: 10 TABLET ORAL at 22:53

## 2023-10-29 RX ADMIN — APIXABAN 5 MG: 5 TABLET, FILM COATED ORAL at 08:16

## 2023-10-29 RX ADMIN — PREGABALIN 75 MG: 75 CAPSULE ORAL at 16:48

## 2023-10-29 RX ADMIN — INSULIN LISPRO 8 UNITS: 100 INJECTION, SOLUTION INTRAVENOUS; SUBCUTANEOUS at 12:18

## 2023-10-29 RX ADMIN — Medication: at 08:18

## 2023-10-29 RX ADMIN — INSULIN LISPRO 8 UNITS: 100 INJECTION, SOLUTION INTRAVENOUS; SUBCUTANEOUS at 20:30

## 2023-10-29 RX ADMIN — IBUPROFEN 400 MG: 400 TABLET, FILM COATED ORAL at 04:20

## 2023-10-29 RX ADMIN — IBUPROFEN 400 MG: 400 TABLET, FILM COATED ORAL at 16:48

## 2023-10-29 RX ADMIN — SIMETHICONE 80 MG: 80 TABLET, CHEWABLE ORAL at 20:31

## 2023-10-29 RX ADMIN — EMPAGLIFLOZIN 10 MG: 10 TABLET, FILM COATED ORAL at 08:16

## 2023-10-29 RX ADMIN — Medication 250 MG: at 08:16

## 2023-10-29 RX ADMIN — ATORVASTATIN CALCIUM 10 MG: 10 TABLET, FILM COATED ORAL at 20:31

## 2023-10-29 RX ADMIN — HYDROCODONE BITARTRATE AND ACETAMINOPHEN 1 TABLET: 7.5; 325 TABLET ORAL at 22:53

## 2023-10-29 RX ADMIN — HYDROCODONE BITARTRATE AND ACETAMINOPHEN 1 TABLET: 7.5; 325 TABLET ORAL at 10:37

## 2023-10-29 RX ADMIN — IBUPROFEN 400 MG: 400 TABLET, FILM COATED ORAL at 10:37

## 2023-10-29 RX ADMIN — INSULIN LISPRO 5 UNITS: 100 INJECTION, SOLUTION INTRAVENOUS; SUBCUTANEOUS at 12:18

## 2023-10-29 RX ADMIN — CHOLESTYRAMINE 1 PACKET: 4 POWDER, FOR SUSPENSION ORAL at 20:31

## 2023-10-29 RX ADMIN — DIPHENOXYLATE HYDROCHLORIDE AND ATROPINE SULFATE 1 TABLET: 2.5; .025 TABLET ORAL at 08:27

## 2023-10-29 RX ADMIN — APIXABAN 5 MG: 5 TABLET, FILM COATED ORAL at 20:31

## 2023-10-29 RX ADMIN — FAMOTIDINE 40 MG: 20 TABLET, FILM COATED ORAL at 08:16

## 2023-10-29 RX ADMIN — DIPHENOXYLATE HYDROCHLORIDE AND ATROPINE SULFATE 1 TABLET: 2.5; .025 TABLET ORAL at 16:48

## 2023-10-29 RX ADMIN — INSULIN DETEMIR 60 UNITS: 100 INJECTION, SOLUTION SUBCUTANEOUS at 20:30

## 2023-10-29 NOTE — PROGRESS NOTES
[facility]   Hospitalist Progress Note  Date: 10/29/2023  Patient Name: Jose Shaikh  : 1960  MRN: [id number]  Date of admission: 9/10/2023      Subjective   Subjective     Chief Complaint: Follow-up weakness    Summary:Jose Shaikh is a 63 y.o. male  initially hospitalized on 9/10/2023, prolonged hospitalization for treatment of management of generalized weakness, deconditioning, with acute issues of diarrhea, C. difficile negative.  Diarrhea improved.  Had small hematoma after ambulating on his foot.  Unfortunately with exhaustive efforts to try to place this gentleman after 39 days of hospitalization, we are unable to find a facility that will accept him.  He has no further acute needs or requirements for inpatient monitoring and management, his labs and vitals are stable, he is tolerating oral intake, and has been refusing turns and repositionings and other interventions with nursing staff despite recommendations.  Patient discharged in hemodynamically stable addition on 10/19/2023 to follow-up with PCP within 1 week.  Unfortunately we cannot solve all of his social issues during this hospitalization due to lack of resources and participation from the patient's perspective despite all our efforts.  Patient has a financial means of making arrangements at home.  Patient appealed his discharge.  Lost his appeal.  LCD: 10/20/23, PFL beginning: 10/21/23 @ 12pm.  Discharge planning continues to work on inpatient placement.    Interval Followup: Patient seen this afternoon and resting comfortably.  Still no new issues per staff.  Fasting blood sugars improving. Still awaiting inpatient placement.  Discharge planning coordinating.    Review of Systems  Constitutional: Negative for fatigue and fever.   HENT: Negative for sore throat and trouble swallowing.    Eyes: Negative for pain and discharge.   Respiratory: Negative for cough and shortness of breath.    Cardiovascular: Negative for chest pain and  palpitations.   Gastrointestinal: Negative for abdominal pain, nausea and vomiting.   Endocrine: Negative for cold intolerance and heat intolerance.   Genitourinary: Negative for difficulty urinating and dysuria.   Musculoskeletal: Negative for back pain and neck stiffness.   Skin: Negative for color change and rash.   Neurological: Negative for syncope and headaches.   Hematological: Negative for adenopathy.   Psychiatric/Behavioral: Negative for confusion and hallucinations.    Objective   Objective     Vitals:   Temp:  [97.3 °F (36.3 °C)-97.9 °F (36.6 °C)] 97.9 °F (36.6 °C)  Heart Rate:  [] 80  Resp:  [18] 18  BP: (121-146)/(56-68) 143/68  Physical Exam   General: well-developed appearing stated age obese in no acute distress  HEENT: Normocephalic atraumatic moist membranes   Pulmonary:symmetric chest expansion, unlabored  Gastrointestinal:  nondistended   Skin: Clean dry   Neuro: grossly no sensorimotor deficits appreciated bilateral upper and lower extremities  Psych: Patient is calm not responding to internal stimuli  : No King catheter     Result Review    Result Review:  I have personally reviewed these results and agree with these findings:  [x]  Laboratory  LAB RESULTS:                                    Brief Urine Lab Results  (Last result in the past 365 days)        Color   Clarity   Blood   Leuk Est   Nitrite   Protein   CREAT   Urine HCG        09/11/23 2200 Dark Yellow   Clear   Negative   Negative   Negative   Negative                 Microbiology Results (last 10 days)       ** No results found for the last 240 hours. **            [x]  Microbiology  []  Radiology  No radiology results for the last 7 days    []  EKG/Telemetry   []  Cardiology/Vascular   []  Pathology  []  Old records  [x]  Other:  Scheduled Meds:ammonium lactate, , Topical, Daily  apixaban, 5 mg, Oral, Q12H  atorvastatin, 10 mg, Oral, Nightly  bacitracin, 1 application , Topical, BID  cholestyramine, 1 packet, Oral,  Q12H  empagliflozin, 10 mg, Oral, Daily  famotidine, 40 mg, Oral, Daily  insulin detemir, 60 Units, Subcutaneous, Nightly  insulin detemir, 65 Units, Subcutaneous, Daily  insulin lispro, 4-24 Units, Subcutaneous, 4x Daily AC & at Bedtime  Insulin Lispro, 5 Units, Subcutaneous, TID With Meals  lisinopril, 2.5 mg, Oral, Q24H  pregabalin, 75 mg, Oral, TID  saccharomyces boulardii, 250 mg, Oral, BID  sodium chloride, 10 mL, Intravenous, Q12H  vitamin B-12, 1,000 mcg, Oral, Daily      Continuous Infusions:   PRN Meds:.  acetaminophen    baclofen    senna-docusate sodium **AND** polyethylene glycol **AND** bisacodyl **AND** bisacodyl    dextrose    dextrose    diphenoxylate-atropine    glucagon (human recombinant)    HYDROcodone-acetaminophen    hydrocortisone-bacitracin-zinc oxide-nystatin    ibuprofen    ondansetron    ondansetron ODT    simethicone    sodium chloride    sodium chloride      Assessment & Plan   Assessment / Plan     Assessment/Plan:  Assessment:  Generalized weakness  Deconditioning  Type 2 diabetes mellitus  Essential hypertension  Chronic paroxysmal atrial fibrillation on Eliquis  Obesity (BMI: 42.88)  Debility with wheelchair dependence  Severe degenerative joint disease of lower extremities  Chronic wound  Thrombocytopenia     Plan:  Patient was discharged initially on 10/19/2023, patient appealed discharge, lost appeal, initial discharge order was canceled by accident and reentered on 10/21/2023, patient remains discharged as of 10/19/2023 but refuses to leave.  PFL beginning 10/21/2023 at 12 PM.  Continue to work with  to determine next steps  Encourage patient to mobilize with therapists and staff  Continue blood sugar control with current regimen Levemir twice daily and titrate up by 5 unites every 3 days while fasting blood sugar greater than 130. This can be done as an outpatient.  Continue sliding scale Humalog   Patient can be discharged home at any time; discharge order  remains  Continue Florastor  Continue as needed Imodium and scheduled Questran  Continue other medications as ordered  PT/OT/ consulted and following         Discussed plan with bedside RN and .    DVT prophylaxis:  Medical DVT prophylaxis orders are present.    CODE STATUS:   Code Status (Patient has no pulse and is not breathing): CPR (Attempt to Resuscitate)  Medical Interventions (Patient has pulse or is breathing): Full Support

## 2023-10-29 NOTE — PLAN OF CARE
Goal Outcome Evaluation:  Plan of Care Reviewed With: patient        Progress: no change  Outcome Evaluation: patient is alert and oriented x4 and on room air. prn pain medications given throughout shift for left hip pain. skin and wound care performed per orders. pt expressed frustration related to discharge plan, is concerned he will have to go home. no new issues or concerns at this time.

## 2023-10-29 NOTE — PLAN OF CARE
Goal Outcome Evaluation:  Plan of Care Reviewed With: patient        Progress: no change  Outcome Evaluation: no acute changes noted this shift. medicated per mar for pain. pt is in no apparent distress at this time, denies any needs currently, call light in reach.

## 2023-10-30 LAB
GLUCOSE BLDC GLUCOMTR-MCNC: 165 MG/DL (ref 70–99)
GLUCOSE BLDC GLUCOMTR-MCNC: 167 MG/DL (ref 70–99)
GLUCOSE BLDC GLUCOMTR-MCNC: 174 MG/DL (ref 70–99)
GLUCOSE BLDC GLUCOMTR-MCNC: 236 MG/DL (ref 70–99)

## 2023-10-30 PROCEDURE — 63710000001 INSULIN DETEMIR PER 5 UNITS: Performed by: FAMILY MEDICINE

## 2023-10-30 PROCEDURE — 82948 REAGENT STRIP/BLOOD GLUCOSE: CPT

## 2023-10-30 PROCEDURE — 63710000001 INSULIN LISPRO (HUMAN) PER 5 UNITS: Performed by: INTERNAL MEDICINE

## 2023-10-30 PROCEDURE — 99231 SBSQ HOSP IP/OBS SF/LOW 25: CPT | Performed by: FAMILY MEDICINE

## 2023-10-30 RX ADMIN — PREGABALIN 75 MG: 75 CAPSULE ORAL at 21:42

## 2023-10-30 RX ADMIN — BACITRACIN 0.9 G: 500 OINTMENT TOPICAL at 21:43

## 2023-10-30 RX ADMIN — INSULIN DETEMIR 65 UNITS: 100 INJECTION, SOLUTION SUBCUTANEOUS at 21:41

## 2023-10-30 RX ADMIN — ATORVASTATIN CALCIUM 10 MG: 10 TABLET, FILM COATED ORAL at 21:42

## 2023-10-30 RX ADMIN — INSULIN LISPRO 4 UNITS: 100 INJECTION, SOLUTION INTRAVENOUS; SUBCUTANEOUS at 17:24

## 2023-10-30 RX ADMIN — APIXABAN 5 MG: 5 TABLET, FILM COATED ORAL at 08:19

## 2023-10-30 RX ADMIN — HYDROCODONE BITARTRATE AND ACETAMINOPHEN 1 TABLET: 7.5; 325 TABLET ORAL at 21:09

## 2023-10-30 RX ADMIN — PREGABALIN 75 MG: 75 CAPSULE ORAL at 08:18

## 2023-10-30 RX ADMIN — INSULIN DETEMIR 65 UNITS: 100 INJECTION, SOLUTION SUBCUTANEOUS at 08:18

## 2023-10-30 RX ADMIN — INSULIN LISPRO 5 UNITS: 100 INJECTION, SOLUTION INTRAVENOUS; SUBCUTANEOUS at 17:24

## 2023-10-30 RX ADMIN — EMPAGLIFLOZIN 10 MG: 10 TABLET, FILM COATED ORAL at 08:19

## 2023-10-30 RX ADMIN — INSULIN LISPRO 5 UNITS: 100 INJECTION, SOLUTION INTRAVENOUS; SUBCUTANEOUS at 08:18

## 2023-10-30 RX ADMIN — CYANOCOBALAMIN TAB 500 MCG 1000 MCG: 500 TAB at 08:18

## 2023-10-30 RX ADMIN — PREGABALIN 75 MG: 75 CAPSULE ORAL at 15:03

## 2023-10-30 RX ADMIN — Medication: at 12:26

## 2023-10-30 RX ADMIN — INSULIN LISPRO 4 UNITS: 100 INJECTION, SOLUTION INTRAVENOUS; SUBCUTANEOUS at 21:41

## 2023-10-30 RX ADMIN — Medication 250 MG: at 08:19

## 2023-10-30 RX ADMIN — IBUPROFEN 400 MG: 400 TABLET, FILM COATED ORAL at 03:11

## 2023-10-30 RX ADMIN — FAMOTIDINE 40 MG: 20 TABLET, FILM COATED ORAL at 08:19

## 2023-10-30 RX ADMIN — INSULIN LISPRO 4 UNITS: 100 INJECTION, SOLUTION INTRAVENOUS; SUBCUTANEOUS at 08:16

## 2023-10-30 RX ADMIN — BACLOFEN 10 MG: 10 TABLET ORAL at 16:07

## 2023-10-30 RX ADMIN — BACITRACIN 0.9 G: 500 OINTMENT TOPICAL at 08:20

## 2023-10-30 RX ADMIN — BACLOFEN 10 MG: 10 TABLET ORAL at 08:19

## 2023-10-30 RX ADMIN — HYDROCODONE BITARTRATE AND ACETAMINOPHEN 1 TABLET: 7.5; 325 TABLET ORAL at 14:13

## 2023-10-30 RX ADMIN — LISINOPRIL 2.5 MG: 2.5 TABLET ORAL at 08:19

## 2023-10-30 RX ADMIN — INSULIN LISPRO 5 UNITS: 100 INJECTION, SOLUTION INTRAVENOUS; SUBCUTANEOUS at 12:26

## 2023-10-30 RX ADMIN — APIXABAN 5 MG: 5 TABLET, FILM COATED ORAL at 21:42

## 2023-10-30 RX ADMIN — INSULIN LISPRO 8 UNITS: 100 INJECTION, SOLUTION INTRAVENOUS; SUBCUTANEOUS at 12:25

## 2023-10-30 RX ADMIN — ACETAMINOPHEN 650 MG: 325 TABLET ORAL at 12:25

## 2023-10-30 RX ADMIN — HYDROCODONE BITARTRATE AND ACETAMINOPHEN 1 TABLET: 7.5; 325 TABLET ORAL at 08:18

## 2023-10-30 RX ADMIN — DIPHENOXYLATE HYDROCHLORIDE AND ATROPINE SULFATE 1 TABLET: 2.5; .025 TABLET ORAL at 03:12

## 2023-10-30 RX ADMIN — Medication 250 MG: at 21:41

## 2023-10-30 NOTE — PROGRESS NOTES
[facility]   Hospitalist Progress Note  Date: 10/30/2023  Patient Name: Jose Shaikh  : 1960  MRN: [id number]  Date of admission: 9/10/2023      Subjective   Subjective     Chief Complaint: Follow-up weakness    Summary:Jose Shaikh is a 63 y.o. male  initially hospitalized on 9/10/2023, prolonged hospitalization for treatment of management of generalized weakness, deconditioning, with acute issues of diarrhea, C. difficile negative.  Diarrhea improved.  Had small hematoma after ambulating on his foot.  Unfortunately with exhaustive efforts to try to place this gentleman after 39 days of hospitalization, we are unable to find a facility that will accept him.  He has no further acute needs or requirements for inpatient monitoring and management, his labs and vitals are stable, he is tolerating oral intake, and has been refusing turns and repositionings and other interventions with nursing staff despite recommendations.  Patient discharged in hemodynamically stable addition on 10/19/2023 to follow-up with PCP within 1 week.  Unfortunately we cannot solve all of his social issues during this hospitalization due to lack of resources and participation from the patient's perspective despite all our efforts.  Patient has a financial means of making arrangements at home.  Patient appealed his discharge.  Lost his appeal.  LCD: 10/20/23, PFL beginning: 10/21/23 @ 12pm.  Discharge planning continues to work on inpatient placement.    Interval Followup: Patient seen this afternoon and resting comfortably.  Still no new issues per staff.  Fasting blood sugars slowly improving. Still awaiting inpatient placement.  Discharge planning coordinating.    Review of Systems  Constitutional: Negative for fatigue and fever.   HENT: Negative for sore throat and trouble swallowing.    Eyes: Negative for pain and discharge.   Respiratory: Negative for cough and shortness of breath.    Cardiovascular: Negative for chest pain  and palpitations.   Gastrointestinal: Negative for abdominal pain, nausea and vomiting.   Endocrine: Negative for cold intolerance and heat intolerance.   Skin: Negative for color change and rash.   Neurological: Negative for syncope and headaches.   Hematological: Negative for adenopathy.   Psychiatric/Behavioral: Negative for confusion and hallucinations.    Objective   Objective     Vitals:   Temp:  [97.4 °F (36.3 °C)-99.1 °F (37.3 °C)] 98.2 °F (36.8 °C)  Heart Rate:  [69-91] 91  Resp:  [16-18] 16  BP: (111-132)/(53-71) 118/63  Physical Exam   General: well-developed appearing stated age obese in no acute distress  HEENT: Normocephalic atraumatic moist membranes   Pulmonary:symmetric chest expansion, unlabored  Gastrointestinal:  nondistended   Skin: Clean dry   Neuro: grossly no sensorimotor deficits appreciated bilateral upper and lower extremities  Psych: Patient is calm not responding to internal stimuli  : No King catheter     Result Review    Result Review:  I have personally reviewed these results and agree with these findings:  [x]  Laboratory  LAB RESULTS:                                    Brief Urine Lab Results  (Last result in the past 365 days)        Color   Clarity   Blood   Leuk Est   Nitrite   Protein   CREAT   Urine HCG        09/11/23 2200 Dark Yellow   Clear   Negative   Negative   Negative   Negative                 Microbiology Results (last 10 days)       ** No results found for the last 240 hours. **            []  Microbiology  []  Radiology  No radiology results for the last 7 days    []  EKG/Telemetry   []  Cardiology/Vascular   []  Pathology  []  Old records  [x]  Other:  Scheduled Meds:ammonium lactate, , Topical, Daily  apixaban, 5 mg, Oral, Q12H  atorvastatin, 10 mg, Oral, Nightly  bacitracin, 1 application , Topical, BID  cholestyramine, 1 packet, Oral, Q12H  empagliflozin, 10 mg, Oral, Daily  famotidine, 40 mg, Oral, Daily  insulin detemir, 65 Units, Subcutaneous,  Nightly  [START ON 10/31/2023] insulin detemir, 70 Units, Subcutaneous, Daily  insulin lispro, 4-24 Units, Subcutaneous, 4x Daily AC & at Bedtime  Insulin Lispro, 5 Units, Subcutaneous, TID With Meals  lisinopril, 2.5 mg, Oral, Q24H  pregabalin, 75 mg, Oral, TID  saccharomyces boulardii, 250 mg, Oral, BID  sodium chloride, 10 mL, Intravenous, Q12H  vitamin B-12, 1,000 mcg, Oral, Daily      Continuous Infusions:   PRN Meds:.  acetaminophen    baclofen    senna-docusate sodium **AND** polyethylene glycol **AND** bisacodyl **AND** bisacodyl    dextrose    dextrose    diphenoxylate-atropine    glucagon (human recombinant)    HYDROcodone-acetaminophen    hydrocortisone-bacitracin-zinc oxide-nystatin    ibuprofen    ondansetron    ondansetron ODT    simethicone    sodium chloride    sodium chloride      Assessment & Plan   Assessment / Plan     Assessment/Plan:  Assessment:  Generalized weakness  Deconditioning  Type 2 diabetes mellitus  Essential hypertension  Chronic paroxysmal atrial fibrillation on Eliquis  Obesity (BMI: 42.88)  Debility with wheelchair dependence  Severe degenerative joint disease of lower extremities  Chronic wound  Thrombocytopenia     Plan:  Patient was discharged initially on 10/19/2023, patient appealed discharge, lost appeal, initial discharge order was canceled by accident and reentered on 10/21/2023, patient remains discharged as of 10/19/2023 but refuses to leave.  PFL beginning 10/21/2023 at 12 PM.  Continue to work with  to determine next steps  Encourage patient to mobilize with therapists and staff  Continue blood sugar control with current regimen Levemir twice daily and titrate up by 5 unites every 3 days while fasting blood sugar greater than 130. This can be done as an outpatient.  Continue sliding scale Humalog   Patient can be discharged home at any time; discharge order remains  Continue Florastor  Continue as needed Imodium and scheduled Questran  Continue other  medications as ordered  PT/OT/ consulted and following         Discussed plan with bedside RN and .    Per discharge planning patient not being seen by PT/OT due to discharge order being present and patient needs to work with PT/OT for inpatient rehab placement.Discharge order removed for that reason only. Patient remains stable for discharge.    DVT prophylaxis:  Medical DVT prophylaxis orders are present.    CODE STATUS:   Code Status (Patient has no pulse and is not breathing): CPR (Attempt to Resuscitate)  Medical Interventions (Patient has pulse or is breathing): Full Support

## 2023-10-30 NOTE — PLAN OF CARE
Goal Outcome Evaluation: patient requesting pain medication on regular basis, able to make needs known

## 2023-10-31 LAB
GLUCOSE BLDC GLUCOMTR-MCNC: 126 MG/DL (ref 70–99)
GLUCOSE BLDC GLUCOMTR-MCNC: 169 MG/DL (ref 70–99)
GLUCOSE BLDC GLUCOMTR-MCNC: 179 MG/DL (ref 70–99)
GLUCOSE BLDC GLUCOMTR-MCNC: 212 MG/DL (ref 70–99)

## 2023-10-31 PROCEDURE — 63710000001 INSULIN LISPRO (HUMAN) PER 5 UNITS: Performed by: INTERNAL MEDICINE

## 2023-10-31 PROCEDURE — 63710000001 INSULIN DETEMIR PER 5 UNITS: Performed by: FAMILY MEDICINE

## 2023-10-31 PROCEDURE — 97168 OT RE-EVAL EST PLAN CARE: CPT

## 2023-10-31 PROCEDURE — 99231 SBSQ HOSP IP/OBS SF/LOW 25: CPT | Performed by: INTERNAL MEDICINE

## 2023-10-31 PROCEDURE — 82948 REAGENT STRIP/BLOOD GLUCOSE: CPT

## 2023-10-31 RX ADMIN — BACLOFEN 10 MG: 10 TABLET ORAL at 21:53

## 2023-10-31 RX ADMIN — INSULIN LISPRO 5 UNITS: 100 INJECTION, SOLUTION INTRAVENOUS; SUBCUTANEOUS at 10:12

## 2023-10-31 RX ADMIN — Medication 250 MG: at 21:53

## 2023-10-31 RX ADMIN — HYDROCODONE BITARTRATE AND ACETAMINOPHEN 1 TABLET: 7.5; 325 TABLET ORAL at 04:48

## 2023-10-31 RX ADMIN — ATORVASTATIN CALCIUM 10 MG: 10 TABLET, FILM COATED ORAL at 21:53

## 2023-10-31 RX ADMIN — INSULIN LISPRO 4 UNITS: 100 INJECTION, SOLUTION INTRAVENOUS; SUBCUTANEOUS at 12:31

## 2023-10-31 RX ADMIN — DIPHENOXYLATE HYDROCHLORIDE AND ATROPINE SULFATE 1 TABLET: 2.5; .025 TABLET ORAL at 06:52

## 2023-10-31 RX ADMIN — INSULIN DETEMIR 65 UNITS: 100 INJECTION, SOLUTION SUBCUTANEOUS at 21:53

## 2023-10-31 RX ADMIN — EMPAGLIFLOZIN 10 MG: 10 TABLET, FILM COATED ORAL at 10:10

## 2023-10-31 RX ADMIN — FAMOTIDINE 40 MG: 20 TABLET, FILM COATED ORAL at 10:10

## 2023-10-31 RX ADMIN — PREGABALIN 75 MG: 75 CAPSULE ORAL at 21:53

## 2023-10-31 RX ADMIN — CYANOCOBALAMIN TAB 500 MCG 1000 MCG: 500 TAB at 10:10

## 2023-10-31 RX ADMIN — INSULIN LISPRO 5 UNITS: 100 INJECTION, SOLUTION INTRAVENOUS; SUBCUTANEOUS at 17:33

## 2023-10-31 RX ADMIN — BACLOFEN 10 MG: 10 TABLET ORAL at 10:24

## 2023-10-31 RX ADMIN — INSULIN DETEMIR 70 UNITS: 100 INJECTION, SOLUTION SUBCUTANEOUS at 10:12

## 2023-10-31 RX ADMIN — DIPHENOXYLATE HYDROCHLORIDE AND ATROPINE SULFATE 1 TABLET: 2.5; .025 TABLET ORAL at 17:38

## 2023-10-31 RX ADMIN — IBUPROFEN 400 MG: 400 TABLET, FILM COATED ORAL at 06:52

## 2023-10-31 RX ADMIN — Medication: at 10:23

## 2023-10-31 RX ADMIN — APIXABAN 5 MG: 5 TABLET, FILM COATED ORAL at 10:10

## 2023-10-31 RX ADMIN — Medication 250 MG: at 10:11

## 2023-10-31 RX ADMIN — PREGABALIN 75 MG: 75 CAPSULE ORAL at 17:32

## 2023-10-31 RX ADMIN — BACITRACIN 0.9 G: 500 OINTMENT TOPICAL at 10:23

## 2023-10-31 RX ADMIN — HYDROCODONE BITARTRATE AND ACETAMINOPHEN 1 TABLET: 7.5; 325 TABLET ORAL at 11:33

## 2023-10-31 RX ADMIN — INSULIN LISPRO 4 UNITS: 100 INJECTION, SOLUTION INTRAVENOUS; SUBCUTANEOUS at 21:53

## 2023-10-31 RX ADMIN — INSULIN LISPRO 5 UNITS: 100 INJECTION, SOLUTION INTRAVENOUS; SUBCUTANEOUS at 12:31

## 2023-10-31 RX ADMIN — INSULIN LISPRO 8 UNITS: 100 INJECTION, SOLUTION INTRAVENOUS; SUBCUTANEOUS at 17:32

## 2023-10-31 RX ADMIN — HYDROCODONE BITARTRATE AND ACETAMINOPHEN 1 TABLET: 7.5; 325 TABLET ORAL at 17:32

## 2023-10-31 RX ADMIN — BACITRACIN 0.9 G: 500 OINTMENT TOPICAL at 21:54

## 2023-10-31 RX ADMIN — PREGABALIN 75 MG: 75 CAPSULE ORAL at 10:10

## 2023-10-31 RX ADMIN — IBUPROFEN 400 MG: 400 TABLET, FILM COATED ORAL at 21:53

## 2023-10-31 RX ADMIN — LISINOPRIL 2.5 MG: 2.5 TABLET ORAL at 10:10

## 2023-10-31 RX ADMIN — IBUPROFEN 400 MG: 400 TABLET, FILM COATED ORAL at 13:41

## 2023-10-31 RX ADMIN — APIXABAN 5 MG: 5 TABLET, FILM COATED ORAL at 21:53

## 2023-10-31 RX ADMIN — IBUPROFEN 400 MG: 400 TABLET, FILM COATED ORAL at 00:09

## 2023-10-31 RX ADMIN — DIPHENOXYLATE HYDROCHLORIDE AND ATROPINE SULFATE 1 TABLET: 2.5; .025 TABLET ORAL at 23:53

## 2023-10-31 RX ADMIN — BACLOFEN 10 MG: 10 TABLET ORAL at 00:09

## 2023-10-31 NOTE — THERAPY RE-EVALUATION
Patient Name: Jose Shaikh  : 1960    MRN: [id number]                              Today's Date: 10/31/2023       Admit Date: 9/10/2023    Visit Dx:     ICD-10-CM ICD-9-CM   1. Generalized weakness  R53.1 780.79   2. Hyponatremia  E87.1 276.1   3. Difficulty in walking  R26.2 719.7   4. Decreased activities of daily living (ADL)  Z78.9 V49.89   5. Difficulty walking  R26.2 719.7     Patient Active Problem List   Diagnosis    Diabetic ulcer of left heel associated with type 2 DM    Acute osteomyelitis of left calcaneus     Diabetic ulcer of left heel associated with type 2 DM    Diabetic ulcer of right midfoot associated with type 2 DM    Paroxysmal atrial fibrillation    Essential hypertension    Hyperlipidemia LDL goal <100    Cellulitis and abscess of foot    High alkaline phosphatase level    Osteomyelitis    Onychomycosis    Onychocryptosis    Foot pain, bilateral    Osteomyelitis of foot, right, acute    Cellulitis of right foot    Type 2 diabetes mellitus, with long-term current use of insulin    Class 3 severe obesity due to excess calories with serious comorbidity and body mass index (BMI) of 45.0 to 49.9 in adult    Anxiety disorder, unspecified    Claustrophobia    Dependence on wheelchair    Depression, unspecified    Long term (current) use of anticoagulants    Long term (current) use of oral hypoglycemic drugs    Wound of foot    Non-prs chronic ulcer oth prt r foot limited to brkdwn skin    Orthostatic hypotension    Other chronic osteomyelitis, right ankle and foot    Personal history of nicotine dependence    Thrombocytopenia, unspecified    Unspecified open wound, right foot, initial encounter    Diabetic foot infection    Subacute osteomyelitis of right foot    Right foot pain    Sepsis    Onychomycosis    Foot pain, left    Impaired mobility and ADLs    Absence of toe of right foot    Corns and callosity    Disability of walking    Fracture    Limb swelling    Polyneuropathy    Pressure  ulcer, stage 1    Shortness of breath    Generalized weakness     Past Medical History:   Diagnosis Date    Absence of toe of right foot     Acute osteomyelitis of left calcaneus  8/18/2021    Anxiety and depression     Arthritis     Claustrophobia     Corns and callus     Diabetic ulcer of left heel associated with type 2 DM 8/18/2021    Diabetic ulcer of left heel associated with type 2 DM 7/6/2021    Diabetic ulcer of right midfoot associated with type 2 DM 8/18/2021    Difficulty walking     Essential hypertension 8/31/2021    Hammertoe     Hyperlipidemia LDL goal <100 8/31/2021    Ingrown toenail     Obesity     Paroxysmal atrial fibrillation 8/31/2021    Polyneuropathy     Pressure ulcer, stage 1     Tinea unguium     Type 2 diabetes mellitus with polyneuropathy      Past Surgical History:   Procedure Laterality Date    CYST REMOVAL      center of back; benign    INCISION AND DRAINAGE ABSCESS      back    INCISION AND DRAINAGE LEG Right 12/10/2021    Procedure: INCISION AND DRAINAGE LOWER EXTREMITY;  Surgeon: Ash Leyva DPM;  Location: [facility] OR;  Service: Podiatry;  Laterality: Right;    OTHER SURGICAL HISTORY      Surgical clips left foot    TOE SURGERY Right     Removal of 5th toe    TRANS METATARSAL AMPUTATION Right 12/2/2021    Procedure: AMPUTATION TRANS METATARSAL;  Surgeon: Ash Leyva DPM;  Location: [facility] MAIN OR;  Service: Podiatry;  Laterality: Right;    WRIST SURGERY Left     repair of injury      General Information       Row Name 10/31/23 0916          OT Time and Intention    Document Type re-evaluation  -AV     Mode of Treatment individual therapy;occupational therapy  -AV       Row Name 10/31/23 0916          General Information    Patient Profile Reviewed yes  -AV     Prior Level of Function independent:  -AV     Existing Precautions/Restrictions fall  -AV     Barriers to Rehab previous functional deficit  Decreased motivation  -AV       Row Name 10/31/23 0916           Occupational Profile    Reason for Services/Referral (Occupational Profile) OT re-evaluation as patient remains hospitalized and is due for recertification.  -AV       Row Name 10/31/23 0916          Living Environment    People in Home alone  -AV       Row Name 10/31/23 0916          Home Main Entrance    Number of Stairs, Main Entrance two  -AV       Row Name 10/31/23 0916          Cognition    Orientation Status (Cognition) --  Alert.  Able to follow commands.  -AV       Row Name 10/31/23 0916          Safety Issues, Functional Mobility    Impairments Affecting Function (Mobility) balance;endurance/activity tolerance  -AV               User Key  (r) = Recorded By, (t) = Taken By, (c) = Cosigned By      Initials Name Provider Type    Uvaldo Hardy OT Occupational Therapist                     Mobility/ADL's       Row Name 10/31/23 0918          Transfers    Comment, (Transfers) impaired. continuously declines transfers and out of bed activity despite encouragement  -AV       Row Name 10/31/23 0918          Activities of Daily Living    BADL Assessment/Intervention --  Independent feeding and grooming with set up in bed.  Continues to require moderate assistance for bathing/dressing.  Independent use of urinal in bed/dependent further toilet hygiene: Bedpan/incontinent (diarrhea).  -AV       Row Name 10/31/23 0918          Mobility    Extremity Weight-bearing Status left lower extremity;right lower extremity  -AV     Left Lower Extremity (Weight-bearing Status) weight-bearing as tolerated (WBAT)  -AV     Right Lower Extremity (Weight-bearing Status) weight-bearing as tolerated (WBAT)  -AV               User Key  (r) = Recorded By, (t) = Taken By, (c) = Cosigned By      Initials Name Provider Type    Uvaldo Hardy OT Occupational Therapist                   Obj/Interventions       Row Name 10/31/23 0920          Sensory Assessment (Somatosensory)    Sensory Assessment (Somatosensory) UE sensation  intact  -AV       Row Name 10/31/23 0920          Vision Assessment/Intervention    Visual Impairment/Limitations WFL;corrective lenses for reading  -AV       Row Name 10/31/23 0920          Range of Motion Comprehensive    General Range of Motion upper extremity range of motion deficits identified  -AV     Comment, General Range of Motion Left #5 flexion contracture  -AV       Row Name 10/31/23 0920          Strength Comprehensive (MMT)    Comment, General Manual Muscle Testing (MMT) Assessment 5/5 bilateral biceps, triceps and   -AV       Row Name 10/31/23 0920          Motor Skills    Coordination WFL  Right dominant  -AV     Functional Endurance Fair minus  -AV       Row Name 10/31/23 0920          Balance    Comment, Balance Impaired  -AV               User Key  (r) = Recorded By, (t) = Taken By, (c) = Cosigned By      Initials Name Provider Type    Uvaldo Hardy OT Occupational Therapist                   Goals/Plan       Row Name 10/31/23 0925          Transfer Goal 1 (OT)    Activity/Assistive Device (Transfer Goal 1, OT) transfers, all  -AV     [place] Level/Cues Needed (Transfer Goal 1, OT) modified independence  -AV     Time Frame (Transfer Goal 1, OT) long term goal (LTG);10 days  -AV     Progress/Outcome (Transfer Goal 1, OT) goal not met;unable to make needed progress  -AV       Row Name 10/31/23 0925          Bathing Goal 1 (OT)    Activity/Device (Bathing Goal 1, OT) bathing skills, all  -AV     [place] Level/Cues Needed (Bathing Goal 1, OT) modified independence  -AV     Time Frame (Bathing Goal 1, OT) long term goal (LTG);10 days  -AV     Progress/Outcomes (Bathing Goal 1, OT) unable to make needed progress;goal not met  -AV       Row Name 10/31/23 0925          Dressing Goal 1 (OT)    Activity/Device (Dressing Goal 1, OT) dressing skills, all  -AV     [place]/Cues Needed (Dressing Goal 1, OT) modified independence  -AV     Time Frame (Dressing Goal 1, OT) long term goal  (LTG);10 days  -AV     Progress/Outcome (Dressing Goal 1, OT) unable to make needed progress;goal not met  -AV       Row Name 10/31/23 0925          Toileting Goal 1 (OT)    Activity/Device (Toileting Goal 1, OT) toileting skills, all;raised toilet seat  -AV     [place] Level/Cues Needed (Toileting Goal 1, OT) modified independence  -AV     Time Frame (Toileting Goal 1, OT) long term goal (LTG);10 days  -AV     Progress/Outcome (Toileting Goal 1, OT) unable to make needed progress;goal not met  -AV       Row Name 10/31/23 0925          Grooming Goal 1 (OT)    Activity/Device (Grooming Goal 1, OT) grooming skills, all  -AV     [place] (Grooming Goal 1, OT) modified independence  -AV     Time Frame (Grooming Goal 1, OT) long term goal (LTG);10 days  -AV     Progress/Outcome (Grooming Goal 1, OT) unable to make needed progress;goal not met  -AV       Row Name 10/31/23 0925          Problem Specific Goal 1 (OT)    Problem Specific Goal 1 (OT) Patient will demonstrate fair plus endurance/activity tolerance needed to support ADLs.  -AV     Time Frame (Problem Specific Goal 1, OT) long term goal (LTG);10 days  -AV     Progress/Outcome (Problem Specific Goal 1, OT) unable to make needed progress;goal not met  -AV               User Key  (r) = Recorded By, (t) = Taken By, (c) = Cosigned By      Initials Name Provider Type    AV Uvaldo Christine OT Occupational Therapist                   Clinical Impression       Row Name 10/31/23 0921          Pain Scale: FACES Pre/Post-Treatment    Pain: FACES Scale, Pretreatment 2-->hurts little bit  -AV     Posttreatment Pain Rating 2-->hurts little bit  -AV     Pain Location generalized  -AV       Row Name 10/31/23 0921          Plan of Care Review    Plan of Care Reviewed With patient  -AV     Progress no change  -AV     Outcome Evaluation Will discharge OT services at this time due to lack of progress in hospital setting.  Patient with poor motivation. Based on functional  status and patient's lack of concern for improving independence, recommend long-term care (with sub-acute rehab services) for transitional ADLs, wheelchair positioning and/or development of restorative program. DC OT.  -AV       Row Name 10/31/23 0921          Therapy Assessment/Plan (OT)    Patient/Family Therapy Goal Statement (OT) NA  -AV     Rehab Potential (OT) --  NA  -AV     Therapy Frequency (OT) --  DC OT  -AV       Row Name 10/31/23 0921          Vital Signs    O2 Delivery Pre Treatment room air  -AV     O2 Delivery Intra Treatment room air  -AV     O2 Delivery Post Treatment room air  -AV       Row Name 10/31/23 0921          Positioning and Restraints    Pre-Treatment Position in bed  -AV     Post Treatment Position bed  -AV     In Bed call light within reach;encouraged to call for assist  -AV               User Key  (r) = Recorded By, (t) = Taken By, (c) = Cosigned By      Initials Name Provider Type    Uvaldo Hardy OT Occupational Therapist                   Outcome Measures       Row Name 10/31/23 0926          How much help from another is currently needed...    Putting on and taking off regular lower body clothing? 1  -AV     Bathing (including washing, rinsing, and drying) 2  -AV     Toileting (which includes using toilet bed pan or urinal) 2  -AV     Putting on and taking off regular upper body clothing 3  -AV     Taking care of personal grooming (such as brushing teeth) 4  -AV     Eating meals 4  -AV     AM-PAC 6 Clicks Score (OT) 16  -AV       Row Name 10/31/23 0926          Optimal Instrument    Bending/Stooping 5  -AV     Standing 5  -AV     Reaching 1  -AV               User Key  (r) = Recorded By, (t) = Taken By, (c) = Cosigned By      Initials Name Provider Type    Uvaldo Hardy OT Occupational Therapist                    Occupational Therapy Education       Title: PT OT SLP Therapies (Done)       Topic: Occupational Therapy (Done)       Point: ADL training (Done)        Description:   Instruct learner(s) on proper safety adaptation and remediation techniques during self care or transfers.   Instruct in proper use of assistive devices.                  Learning Progress Summary             Patient Acceptance, E, VU by AV at 10/31/2023 0927    Comment: DC OT services due to lack of progress    Acceptance, E, VU,NR by RASHARD at 9/24/2023 0604    Acceptance, E, VU,DU by RF at 9/18/2023 1537    Acceptance, E, VU,DU by RF at 9/17/2023 1515    Acceptance, E, VU,DU by RF at 9/16/2023 1804    Acceptance, E, VU by AV at 9/13/2023 1059                         Point: Home exercise program (Done)       Description:   Instruct learner(s) on appropriate technique for monitoring, assisting and/or progressing therapeutic exercises/activities.                  Learning Progress Summary             Patient Acceptance, E, VU by AV at 10/31/2023 0927    Comment: DC OT services due to lack of progress    Acceptance, E, VU,NR by RASHARD at 9/24/2023 0604    Acceptance, E, VU,DU by RF at 9/18/2023 1537    Acceptance, E, VU,DU by RF at 9/17/2023 1515    Acceptance, E, VU,DU by RF at 9/16/2023 1804    Acceptance, E, VU by AV at 9/13/2023 1059                         Point: Precautions (Done)       Description:   Instruct learner(s) on prescribed precautions during self-care and functional transfers.                  Learning Progress Summary             Patient Acceptance, E, VU by AV at 10/31/2023 0927    Comment: DC OT services due to lack of progress    Acceptance, E, VU,NR by RASHARD at 9/24/2023 0604    Acceptance, E, VU,DU by RF at 9/18/2023 1537    Acceptance, E, VU,DU by RF at 9/17/2023 1515    Acceptance, E, VU,DU by RF at 9/16/2023 1804    Acceptance, E, VU by AV at 9/13/2023 1059                         Point: Body mechanics (Done)       Description:   Instruct learner(s) on proper positioning and spine alignment during self-care, functional mobility activities and/or exercises.                  Learning  Progress Summary             Patient Acceptance, E, VU by AV at 10/31/2023 0927    Comment: DC OT services due to lack of progress    Acceptance, E, VU,NR by  at 9/24/2023 0604    Acceptance, E, VU,DU by  at 9/18/2023 1537    Acceptance, E, VU,DU by RF at 9/17/2023 1515    Acceptance, E, VU,DU by RF at 9/16/2023 1804    Acceptance, E, VU by AV at 9/13/2023 1059                                         User Key       Initials Effective Dates Name Provider Type Discipline     06/16/21 -  Selena Lindquist, RN Registered Nurse Nurse    AV 06/16/21 -  Uvaldo Christine OT Occupational Therapist OT     01/19/22 -  Karl Baker, RN Registered Nurse Nurse                  OT Recommendation and Plan  Planned Therapy Interventions (OT): activity tolerance training, BADL retraining, functional balance retraining, occupation/activity based interventions, patient/caregiver education/training, transfer/mobility retraining  Therapy Frequency (OT):  (DC OT)  Plan of Care Review  Plan of Care Reviewed With: patient  Progress: no change  Outcome Evaluation: Will discharge OT services at this time due to lack of progress in hospital setting.  Patient with poor motivation. Based on functional status and patient's lack of concern for improving independence, recommend long-term care (with sub-acute rehab services) for transitional ADLs, wheelchair positioning and/or development of restorative program. DC OT.     Time Calculation:   Evaluation Complexity (OT)  Review Occupational Profile/Medical/Therapy History Complexity: expanded/moderate complexity  Assessment, Occupational Performance/Identification of Deficit Complexity: 3-5 performance deficits  Clinical Decision Making Complexity (OT): problem focused assessment/low complexity  Overall Complexity of Evaluation (OT): low complexity     Time Calculation- OT       Row Name 10/31/23 0927             Time Calculation- OT    OT Received On 10/31/23  -AV         Untimed Charges    OT  Eval/Re-eval Minutes 25  -AV         Total Minutes    Untimed Charges Total Minutes 25  -AV       Total Minutes 25  -AV                User Key  (r) = Recorded By, (t) = Taken By, (c) = Cosigned By      Initials Name Provider Type    Uvaldo Hardy OT Occupational Therapist                  Therapy Charges for Today       Code Description Service Date Service Provider Modifiers Qty    [id number] HC OT RE-EVAL 2 10/31/2023 Uvaldo Christine OT GO 1                 Uvaldo Christine OT  10/31/2023

## 2023-10-31 NOTE — PLAN OF CARE
Goal Outcome Evaluation:  Plan of Care Reviewed With: patient        Progress: no change  Outcome Evaluation: Will discharge OT services at this time due to lack of progress in hospital setting.  Patient with poor motivation. Based on functional status and patient's lack of concern for improving independence, recommend long-term care (with sub-acute rehab services) for transitional ADLs, wheelchair positioning and/or development of restorative program. DC OT.      Anticipated Discharge Disposition (OT): inpatient rehabilitation facility, sub acute care setting

## 2023-10-31 NOTE — PROGRESS NOTES
[facility]   Hospitalist Progress Note  Date: 10/31/2023  Patient Name: Jose Shaikh  : 1960  MRN: [id number]  Date of admission: 9/10/2023      Subjective   Subjective     Chief Complaint: Follow-up weakness    Summary:Jose Shaikh is a 63 y.o. male  initially hospitalized on 9/10/2023, prolonged hospitalization for treatment of management of generalized weakness, deconditioning, with acute issues of diarrhea, C. difficile negative.  Diarrhea improved.  Had small hematoma after ambulating on his foot.  Unfortunately with exhaustive efforts to try to place this gentleman after 39 days of hospitalization, we are unable to find a facility that will accept him.  He has no further acute needs or requirements for inpatient monitoring and management, his labs and vitals are stable, he is tolerating oral intake, and has been refusing turns and repositionings and other interventions with nursing staff despite recommendations.  Patient discharged in hemodynamically stable addition on 10/19/2023 to follow-up with PCP within 1 week.  Unfortunately we cannot solve all of his social issues during this hospitalization due to lack of resources and participation from the patient's perspective despite all our efforts.  Patient has a financial means of making arrangements at home.  Patient appealed his discharge.  Lost his appeal.  LCD: 10/20/23, PFL beginning: 10/21/23 @ 12pm.  Discharge planning continues to work on inpatient placement.    Interval Followup:   No acute events overnight, patient resting comfortably in bed    Objective   Objective     Vitals:   Temp:  [98.1 °F (36.7 °C)-98.4 °F (36.9 °C)] 98.2 °F (36.8 °C)  Heart Rate:  [85-91] 85  Resp:  [16] 16  BP: (118-138)/(53-73) 130/71  Physical Exam   GEN: No acute distress  HEENT: Moist mucous membranes  LUNGS: Equal chest rise bilaterally  CARDIAC: Regular rate and rhythm  NEURO: Moving all 4 extremities spontaneously  SKIN: No obvious breakdown    Result  Review    Result Review:  I have personally reviewed these results and agree with these findings:  [x]  Laboratory  LAB RESULTS:                                    Brief Urine Lab Results  (Last result in the past 365 days)        Color   Clarity   Blood   Leuk Est   Nitrite   Protein   CREAT   Urine HCG        09/11/23 2200 Dark Yellow   Clear   Negative   Negative   Negative   Negative                 Microbiology Results (last 10 days)       ** No results found for the last 240 hours. **            []  Microbiology  []  Radiology  No radiology results for the last 7 days    []  EKG/Telemetry   []  Cardiology/Vascular   []  Pathology  []  Old records  [x]  Other:  Scheduled Meds:ammonium lactate, , Topical, Daily  apixaban, 5 mg, Oral, Q12H  atorvastatin, 10 mg, Oral, Nightly  bacitracin, 1 application , Topical, BID  cholestyramine, 1 packet, Oral, Q12H  empagliflozin, 10 mg, Oral, Daily  famotidine, 40 mg, Oral, Daily  insulin detemir, 65 Units, Subcutaneous, Nightly  insulin detemir, 70 Units, Subcutaneous, Daily  insulin lispro, 4-24 Units, Subcutaneous, 4x Daily AC & at Bedtime  Insulin Lispro, 5 Units, Subcutaneous, TID With Meals  lisinopril, 2.5 mg, Oral, Q24H  pregabalin, 75 mg, Oral, TID  saccharomyces boulardii, 250 mg, Oral, BID  sodium chloride, 10 mL, Intravenous, Q12H  vitamin B-12, 1,000 mcg, Oral, Daily      Continuous Infusions:   PRN Meds:.  acetaminophen    baclofen    senna-docusate sodium **AND** polyethylene glycol **AND** bisacodyl **AND** bisacodyl    dextrose    dextrose    diphenoxylate-atropine    glucagon (human recombinant)    HYDROcodone-acetaminophen    hydrocortisone-bacitracin-zinc oxide-nystatin    ibuprofen    ondansetron    ondansetron ODT    simethicone    sodium chloride    sodium chloride      Assessment & Plan   Assessment / Plan     Assessment/Plan:  Assessment:  Generalized weakness  Deconditioning  Type 2 diabetes mellitus  Essential hypertension  Chronic paroxysmal  atrial fibrillation on Eliquis  Obesity (BMI: 42.88)  Debility with wheelchair dependence  Severe degenerative joint disease of lower extremities  Chronic wound  Thrombocytopenia     Plan:  Patient was discharged initially on 10/19/2023, patient appealed discharge, lost appeal, initial discharge order was canceled by accident and reentered on 10/21/2023, patient remains discharged as of 10/19/2023 but refuses to leave.  PFL beginning 10/21/2023 at 12 PM.  Continue to work with  to determine next steps  Encourage patient to mobilize with therapists and staff  Continue blood sugar control with current regimen Levemir twice daily and titrate up by 5 unites every 3 days while fasting blood sugar greater than 130. This can be done as an outpatient.  Continue sliding scale Humalog   Patient can be discharged home at any time; discharge order remains  Continue Florastor  Continue as needed Imodium and scheduled Questran  Continue other medications as ordered  PT/OT/ consulted and following         Discussed plan with bedside RN and .    Per discharge planning patient not being seen by PT/OT due to discharge order being present and patient needs to work with PT/OT for inpatient rehab placement.Discharge order removed for that reason only. Patient remains stable for discharge.    DVT prophylaxis:  Medical DVT prophylaxis orders are present.    CODE STATUS:   Code Status (Patient has no pulse and is not breathing): CPR (Attempt to Resuscitate)  Medical Interventions (Patient has pulse or is breathing): Full Support        Electronically signed by Max Mehta MD, 10/31/23, 12:00 PM EDT.

## 2023-10-31 NOTE — PLAN OF CARE
Goal Outcome Evaluation:      Patient a&ox4. Skin care completed as ordered. Medicated for c/o pain per MAR.

## 2023-11-01 LAB
GLUCOSE BLDC GLUCOMTR-MCNC: 102 MG/DL (ref 70–99)
GLUCOSE BLDC GLUCOMTR-MCNC: 215 MG/DL (ref 70–99)
GLUCOSE BLDC GLUCOMTR-MCNC: 277 MG/DL (ref 70–99)
GLUCOSE BLDC GLUCOMTR-MCNC: 321 MG/DL (ref 70–99)

## 2023-11-01 PROCEDURE — 63710000001 INSULIN DETEMIR PER 5 UNITS: Performed by: FAMILY MEDICINE

## 2023-11-01 PROCEDURE — 97164 PT RE-EVAL EST PLAN CARE: CPT

## 2023-11-01 PROCEDURE — 63710000001 INSULIN LISPRO (HUMAN) PER 5 UNITS: Performed by: INTERNAL MEDICINE

## 2023-11-01 PROCEDURE — 99231 SBSQ HOSP IP/OBS SF/LOW 25: CPT | Performed by: INTERNAL MEDICINE

## 2023-11-01 PROCEDURE — 82948 REAGENT STRIP/BLOOD GLUCOSE: CPT

## 2023-11-01 RX ADMIN — IBUPROFEN 400 MG: 400 TABLET, FILM COATED ORAL at 16:39

## 2023-11-01 RX ADMIN — FAMOTIDINE 40 MG: 20 TABLET, FILM COATED ORAL at 08:21

## 2023-11-01 RX ADMIN — EMPAGLIFLOZIN 10 MG: 10 TABLET, FILM COATED ORAL at 08:21

## 2023-11-01 RX ADMIN — PREGABALIN 75 MG: 75 CAPSULE ORAL at 16:14

## 2023-11-01 RX ADMIN — HYDROCORTISONE 1 APPLICATION: 1 OINTMENT TOPICAL at 17:59

## 2023-11-01 RX ADMIN — PREGABALIN 75 MG: 75 CAPSULE ORAL at 08:21

## 2023-11-01 RX ADMIN — INSULIN LISPRO 12 UNITS: 100 INJECTION, SOLUTION INTRAVENOUS; SUBCUTANEOUS at 17:35

## 2023-11-01 RX ADMIN — APIXABAN 5 MG: 5 TABLET, FILM COATED ORAL at 08:21

## 2023-11-01 RX ADMIN — BACITRACIN 0.9 G: 500 OINTMENT TOPICAL at 21:26

## 2023-11-01 RX ADMIN — Medication 250 MG: at 21:25

## 2023-11-01 RX ADMIN — INSULIN DETEMIR 65 UNITS: 100 INJECTION, SOLUTION SUBCUTANEOUS at 21:25

## 2023-11-01 RX ADMIN — APIXABAN 5 MG: 5 TABLET, FILM COATED ORAL at 21:25

## 2023-11-01 RX ADMIN — BACLOFEN 10 MG: 10 TABLET ORAL at 08:21

## 2023-11-01 RX ADMIN — ATORVASTATIN CALCIUM 10 MG: 10 TABLET, FILM COATED ORAL at 21:25

## 2023-11-01 RX ADMIN — HYDROCODONE BITARTRATE AND ACETAMINOPHEN 1 TABLET: 7.5; 325 TABLET ORAL at 17:58

## 2023-11-01 RX ADMIN — INSULIN DETEMIR 70 UNITS: 100 INJECTION, SOLUTION SUBCUTANEOUS at 08:21

## 2023-11-01 RX ADMIN — PREGABALIN 75 MG: 75 CAPSULE ORAL at 21:25

## 2023-11-01 RX ADMIN — LISINOPRIL 2.5 MG: 2.5 TABLET ORAL at 08:21

## 2023-11-01 RX ADMIN — HYDROCODONE BITARTRATE AND ACETAMINOPHEN 1 TABLET: 7.5; 325 TABLET ORAL at 11:52

## 2023-11-01 RX ADMIN — Medication 250 MG: at 08:21

## 2023-11-01 RX ADMIN — INSULIN LISPRO 5 UNITS: 100 INJECTION, SOLUTION INTRAVENOUS; SUBCUTANEOUS at 11:53

## 2023-11-01 RX ADMIN — Medication: at 09:08

## 2023-11-01 RX ADMIN — BACLOFEN 10 MG: 10 TABLET ORAL at 16:39

## 2023-11-01 RX ADMIN — INSULIN LISPRO 8 UNITS: 100 INJECTION, SOLUTION INTRAVENOUS; SUBCUTANEOUS at 11:53

## 2023-11-01 RX ADMIN — IBUPROFEN 400 MG: 400 TABLET, FILM COATED ORAL at 22:51

## 2023-11-01 RX ADMIN — CYANOCOBALAMIN TAB 500 MCG 1000 MCG: 500 TAB at 08:21

## 2023-11-01 RX ADMIN — INSULIN LISPRO 16 UNITS: 100 INJECTION, SOLUTION INTRAVENOUS; SUBCUTANEOUS at 21:25

## 2023-11-01 RX ADMIN — IBUPROFEN 400 MG: 400 TABLET, FILM COATED ORAL at 08:21

## 2023-11-01 RX ADMIN — HYDROCODONE BITARTRATE AND ACETAMINOPHEN 1 TABLET: 7.5; 325 TABLET ORAL at 05:41

## 2023-11-01 RX ADMIN — INSULIN LISPRO 5 UNITS: 100 INJECTION, SOLUTION INTRAVENOUS; SUBCUTANEOUS at 08:21

## 2023-11-01 RX ADMIN — BACITRACIN 0.9 G: 500 OINTMENT TOPICAL at 08:25

## 2023-11-01 RX ADMIN — INSULIN LISPRO 5 UNITS: 100 INJECTION, SOLUTION INTRAVENOUS; SUBCUTANEOUS at 17:36

## 2023-11-01 NOTE — SIGNIFICANT NOTE
Wound Eval / Progress Noted    KEO Dominguez     Patient Name: Jose Shaikh  : 1960  MRN: [id number]  Today's Date: 2023                 Admit Date: 9/10/2023    Visit Dx:    ICD-10-CM ICD-9-CM   1. Generalized weakness  R53.1 780.79   2. Hyponatremia  E87.1 276.1   3. Difficulty in walking  R26.2 719.7   4. Decreased activities of daily living (ADL)  Z78.9 V49.89   5. Difficulty walking  R26.2 719.7         Generalized weakness    Diabetic ulcer of left heel associated with type 2 DM    Paroxysmal atrial fibrillation    Essential hypertension    Foot pain, bilateral    Type 2 diabetes mellitus, with long-term current use of insulin    Class 3 severe obesity due to excess calories with serious comorbidity and body mass index (BMI) of 45.0 to 49.9 in adult    Dependence on wheelchair        Past Medical History:   Diagnosis Date    Absence of toe of right foot     Acute osteomyelitis of left calcaneus  2021    Anxiety and depression     Arthritis     Claustrophobia     Corns and callus     Diabetic ulcer of left heel associated with type 2 DM 2021    Diabetic ulcer of left heel associated with type 2 DM 2021    Diabetic ulcer of right midfoot associated with type 2 DM 2021    Difficulty walking     Essential hypertension 2021    Hammertoe     Hyperlipidemia LDL goal <100 2021    Ingrown toenail     Obesity     Paroxysmal atrial fibrillation 2021    Polyneuropathy     Pressure ulcer, stage 1     Tinea unguium     Type 2 diabetes mellitus with polyneuropathy         Past Surgical History:   Procedure Laterality Date    CYST REMOVAL      center of back; benign    INCISION AND DRAINAGE ABSCESS      back    INCISION AND DRAINAGE LEG Right 12/10/2021    Procedure: INCISION AND DRAINAGE LOWER EXTREMITY;  Surgeon: Ash Leyva DPM;  Location: [facility] MAIN OR;  Service: Podiatry;  Laterality: Right;    OTHER SURGICAL HISTORY      Surgical clips left foot    TOE SURGERY  Right     Removal of 5th toe    TRANS METATARSAL AMPUTATION Right 12/2/2021    Procedure: AMPUTATION TRANS METATARSAL;  Surgeon: Ash Leyva DPM;  Location: [facility] MAIN OR;  Service: Podiatry;  Laterality: Right;    WRIST SURGERY Left     repair of injury         Physical Assessment:  Wound 12/02/21 Right anterior foot Incision (Active)   Wound Image   11/01/23 0915   Dressing Appearance open to air 11/01/23 0915   Closure None 11/01/23 0915   Base dry;red;scab 11/01/23 0915   Periwound intact;dry 11/01/23 0915   Periwound Temperature warm 11/01/23 0915   Periwound Skin Turgor soft 11/01/23 0915   Edges rolled/closed 11/01/23 0915   Drainage Amount none 11/01/23 0915   Care, Wound cleansed with;sterile normal saline 11/01/23 0915   Dressing Care open to air;antimicrobial agent applied 11/01/23 0915   Periwound Care topical treatment applied 11/01/23 0915       Wound 10/17/23 0546 Bilateral perirectal MASD (Moisture associated skin damage) (Active)   Dressing Appearance open to air 11/01/23 0915   Closure None 11/01/23 0915   Base moist;red 11/01/23 0915   Periwound intact;redness;blanchable 11/01/23 0915   Periwound Temperature warm 11/01/23 0915   Periwound Skin Turgor soft 11/01/23 0915   Drainage Amount none 11/01/23 0915   Care, Wound cleansed with;sterile normal saline 11/01/23 0915   Dressing Care open to air;skin barrier agent applied 11/01/23 0915   Periwound Care topical treatment applied 11/01/23 0915       Wound 11/01/23 1312 Left anterior second toe Blisters (Active)   Wound Image   11/01/23 0915   Dressing Appearance open to air 11/01/23 0915   Closure None 11/01/23 0915   Base maroon/purple;dry 11/01/23 0915   Periwound intact;blistered;redness 11/01/23 0915   Periwound Temperature warm 11/01/23 0915   Periwound Skin Turgor soft 11/01/23 0915   Edges rolled/closed 11/01/23 0915   Drainage Amount none 11/01/23 0915   Care, Wound cleansed with;sterile normal saline 11/01/23 0915   Dressing  Care open to air;antimicrobial agent applied 11/01/23 0915   Periwound Care topical treatment applied 11/01/23 0915        Wound Check / Follow-up:  Patient seen today for a wound check and skin care. Margarita-rectal tissue remains reddened at this time but all tissue is intact. Recommending to continue application of moisture barrier creams.  Traumatic injury to the right foot beginning to resolve; tissue remains red / purple in color. Injury to the left second toe with maroon / purple tissue, with blistering noted. Patient unable to recall etiology. Recommending to continue application of topical treatments at this time.  Folds dry and intact with no moisture noted at this time.    Impression: moisture / redness to rectal area, traumatic injury to the right foot and left second toe    Short term goals:  regain skin integrity, topical treatments, moisture prevention, topical treatment    Karon Bolton RN    11/1/2023    13:14 EDT

## 2023-11-01 NOTE — PLAN OF CARE
Goal Outcome Evaluation:  Plan of Care Reviewed With: patient        Progress: no change  Outcome Evaluation: patient medicated per orders. skin and wound care provided per orders. blood glucose monitored this shift. patient refused to be turned and repositioned this shift.

## 2023-11-01 NOTE — PLAN OF CARE
Goal Outcome Evaluation:  Plan of Care Reviewed With: (P) patient           Outcome Evaluation: (P) Pt presents with decreased BLE strength and activity endurance. He can only tolerate standing for short period of time before his hip pain becomes unbearable and his legs fatigue. He continues to require skilled PT services to address these deficits so that he can safely improve his functional independence.      Anticipated Discharge Disposition (PT): (P) inpatient rehabilitation facility

## 2023-11-01 NOTE — PLAN OF CARE
Goal Outcome Evaluation:  Plan of Care Reviewed With: patient        Progress: no change  Outcome Evaluation: PRN pain and antidiarhheal meds given overnight. Patient awake off an on. Continues to frequently seek out staff for assistance. No other issues observed or reported

## 2023-11-01 NOTE — PROGRESS NOTES
[facility]   Hospitalist Progress Note  Date: 2023  Patient Name: Jose Shaikh  : 1960  MRN: [id number]  Date of admission: 9/10/2023      Subjective   Subjective     Chief Complaint: Follow-up weakness    Summary:Jose Shaikh is a 63 y.o. male  initially hospitalized on 9/10/2023, prolonged hospitalization for treatment of management of generalized weakness, deconditioning, with acute issues of diarrhea, C. difficile negative.  Diarrhea improved.  Had small hematoma after ambulating on his foot.  Unfortunately with exhaustive efforts to try to place this gentleman after 39 days of hospitalization, we are unable to find a facility that will accept him.  He has no further acute needs or requirements for inpatient monitoring and management, his labs and vitals are stable, he is tolerating oral intake, and has been refusing turns and repositionings and other interventions with nursing staff despite recommendations.  Patient discharged in hemodynamically stable addition on 10/19/2023 to follow-up with PCP within 1 week.  Unfortunately we cannot solve all of his social issues during this hospitalization due to lack of resources and participation from the patient's perspective despite all our efforts.  Patient has a financial means of making arrangements at home.  Patient appealed his discharge.  Lost his appeal.  LCD: 10/20/23, PFL beginning: 10/21/23 @ 12pm.  Discharge planning continues to work on inpatient placement.    Interval Followup:   No acute events overnight, patient resting comfortably in bed    Objective   Objective     Vitals:   Temp:  [97.3 °F (36.3 °C)-97.7 °F (36.5 °C)] 97.4 °F (36.3 °C)  Heart Rate:  [82-90] 89  Resp:  [16-18] 18  BP: (104-124)/(50-65) 113/56  Physical Exam   GEN: No acute distress  HEENT: Moist mucous membranes  LUNGS: Equal chest rise bilaterally  CARDIAC: Regular rate and rhythm  NEURO: Moving all 4 extremities spontaneously  SKIN: No obvious breakdown    Result  Review    Result Review:  I have personally reviewed these results and agree with these findings:  [x]  Laboratory  LAB RESULTS:                                    Brief Urine Lab Results  (Last result in the past 365 days)        Color   Clarity   Blood   Leuk Est   Nitrite   Protein   CREAT   Urine HCG        09/11/23 2200 Dark Yellow   Clear   Negative   Negative   Negative   Negative                 Microbiology Results (last 10 days)       ** No results found for the last 240 hours. **            []  Microbiology  []  Radiology  No radiology results for the last 7 days    []  EKG/Telemetry   []  Cardiology/Vascular   []  Pathology  []  Old records  [x]  Other:  Scheduled Meds:ammonium lactate, , Topical, Daily  apixaban, 5 mg, Oral, Q12H  atorvastatin, 10 mg, Oral, Nightly  bacitracin, 1 application , Topical, BID  cholestyramine, 1 packet, Oral, Q12H  empagliflozin, 10 mg, Oral, Daily  famotidine, 40 mg, Oral, Daily  insulin detemir, 65 Units, Subcutaneous, Nightly  insulin detemir, 70 Units, Subcutaneous, Daily  insulin lispro, 4-24 Units, Subcutaneous, 4x Daily AC & at Bedtime  Insulin Lispro, 5 Units, Subcutaneous, TID With Meals  lisinopril, 2.5 mg, Oral, Q24H  pregabalin, 75 mg, Oral, TID  saccharomyces boulardii, 250 mg, Oral, BID  sodium chloride, 10 mL, Intravenous, Q12H  vitamin B-12, 1,000 mcg, Oral, Daily      Continuous Infusions:   PRN Meds:.  acetaminophen    baclofen    senna-docusate sodium **AND** polyethylene glycol **AND** bisacodyl **AND** bisacodyl    dextrose    dextrose    diphenoxylate-atropine    glucagon (human recombinant)    HYDROcodone-acetaminophen    hydrocortisone-bacitracin-zinc oxide-nystatin    ibuprofen    ondansetron    ondansetron ODT    simethicone    sodium chloride    sodium chloride      Assessment & Plan   Assessment / Plan     Assessment/Plan:  Assessment:  Generalized weakness  Deconditioning  Type 2 diabetes mellitus  Essential hypertension  Chronic paroxysmal  atrial fibrillation on Eliquis  Obesity (BMI: 42.88)  Debility with wheelchair dependence  Severe degenerative joint disease of lower extremities  Chronic wound  Thrombocytopenia     Plan:  Patient was discharged initially on 10/19/2023, patient appealed discharge, lost appeal, initial discharge order was canceled by accident and reentered on 10/21/2023, patient remains discharged as of 10/19/2023 but refuses to leave.  PFL beginning 10/21/2023 at 12 PM.  Continue to work with  to determine next steps  Encourage patient to mobilize with therapists and staff  Continue blood sugar control with current regimen Levemir twice daily and titrate up by 5 unites every 3 days while fasting blood sugar greater than 130. This can be done as an outpatient.  Continue sliding scale Humalog   Patient can be discharged home at any time; discharge order remains  Continue Florastor  Continue as needed Imodium and scheduled Questran  Continue other medications as ordered  PT/OT/ consulted and following         Discussed plan with bedside RN and .    Per discharge planning patient not being seen by PT/OT due to discharge order being present and patient needs to work with PT/OT for inpatient rehab placement.Discharge order removed for that reason only. Patient remains stable for discharge.    DVT prophylaxis:  Medical DVT prophylaxis orders are present.    CODE STATUS:   Code Status (Patient has no pulse and is not breathing): CPR (Attempt to Resuscitate)  Medical Interventions (Patient has pulse or is breathing): Full Support        Electronically signed by Max Mehta MD, 11/01/23, 12:33 PM EDT.

## 2023-11-01 NOTE — THERAPY RE-EVALUATION
Acute Care - Physical Therapy Re-Evaluation  KEO Dominguez     Patient Name: Jose Shaikh  : 1960  MRN: [id number]  Today's Date: 2023      Visit Dx:     ICD-10-CM ICD-9-CM   1. Generalized weakness  R53.1 780.79   2. Hyponatremia  E87.1 276.1   3. Difficulty in walking  R26.2 719.7   4. Decreased activities of daily living (ADL)  Z78.9 V49.89   5. Difficulty walking  R26.2 719.7     Patient Active Problem List   Diagnosis    Diabetic ulcer of left heel associated with type 2 DM    Acute osteomyelitis of left calcaneus     Diabetic ulcer of left heel associated with type 2 DM    Diabetic ulcer of right midfoot associated with type 2 DM    Paroxysmal atrial fibrillation    Essential hypertension    Hyperlipidemia LDL goal <100    Cellulitis and abscess of foot    High alkaline phosphatase level    Osteomyelitis    Onychomycosis    Onychocryptosis    Foot pain, bilateral    Osteomyelitis of foot, right, acute    Cellulitis of right foot    Type 2 diabetes mellitus, with long-term current use of insulin    Class 3 severe obesity due to excess calories with serious comorbidity and body mass index (BMI) of 45.0 to 49.9 in adult    Anxiety disorder, unspecified    Claustrophobia    Dependence on wheelchair    Depression, unspecified    Long term (current) use of anticoagulants    Long term (current) use of oral hypoglycemic drugs    Wound of foot    Non-prs chronic ulcer oth prt r foot limited to brkdwn skin    Orthostatic hypotension    Other chronic osteomyelitis, right ankle and foot    Personal history of nicotine dependence    Thrombocytopenia, unspecified    Unspecified open wound, right foot, initial encounter    Diabetic foot infection    Subacute osteomyelitis of right foot    Right foot pain    Sepsis    Onychomycosis    Foot pain, left    Impaired mobility and ADLs    Absence of toe of right foot    Corns and callosity    Disability of walking    Fracture    Limb swelling    Polyneuropathy     Pressure ulcer, stage 1    Shortness of breath    Generalized weakness     Past Medical History:   Diagnosis Date    Absence of toe of right foot     Acute osteomyelitis of left calcaneus  8/18/2021    Anxiety and depression     Arthritis     Claustrophobia     Corns and callus     Diabetic ulcer of left heel associated with type 2 DM 8/18/2021    Diabetic ulcer of left heel associated with type 2 DM 7/6/2021    Diabetic ulcer of right midfoot associated with type 2 DM 8/18/2021    Difficulty walking     Essential hypertension 8/31/2021    Hammertoe     Hyperlipidemia LDL goal <100 8/31/2021    Ingrown toenail     Obesity     Paroxysmal atrial fibrillation 8/31/2021    Polyneuropathy     Pressure ulcer, stage 1     Tinea unguium     Type 2 diabetes mellitus with polyneuropathy      Past Surgical History:   Procedure Laterality Date    CYST REMOVAL      center of back; benign    INCISION AND DRAINAGE ABSCESS      back    INCISION AND DRAINAGE LEG Right 12/10/2021    Procedure: INCISION AND DRAINAGE LOWER EXTREMITY;  Surgeon: Ash Leyva DPM;  Location: [facility];  Service: Podiatry;  Laterality: Right;    OTHER SURGICAL HISTORY      Surgical clips left foot    TOE SURGERY Right     Removal of 5th toe    TRANS METATARSAL AMPUTATION Right 12/2/2021    Procedure: AMPUTATION TRANS METATARSAL;  Surgeon: Ash Leyva DPM;  Location: [facility] OR;  Service: Podiatry;  Laterality: Right;    WRIST SURGERY Left     repair of injury     PT Assessment (last 12 hours)       PT Evaluation and Treatment       Row Name 11/01/23 5226          Physical Therapy Time and Intention    Subjective Information complains of;weakness;pain (P)   -ZT     Document Type re-evaluation (P)   -ZT     Mode of Treatment individual therapy;physical therapy (P)   -ZT     Patient Effort good (P)   -ZT       Row Name 11/01/23 6058          General Information    Patient Profile Reviewed yes (P)   -ZT     Patient Observations  alert;cooperative;agree to therapy (P)   -ZT     Prior Level of Function independent:;all household mobility;ADL's (P)   -ZT     Equipment Currently Used at Home wheelchair;walker, rolling (P)   -ZT     Existing Precautions/Restrictions fall (P)   -ZT       Row Name 11/01/23 1345          Living Environment    Current Living Arrangements home (P)   -ZT     Home Accessibility wheelchair accessible (P)   -ZT     People in Home alone (P)   -ZT     Primary Care Provided by self;other (see comments) (P)   -ZT       Row Name 11/01/23 1345          Home Main Entrance    Number of Stairs, Main Entrance none (P)   -ZT       Row Name 11/01/23 1345          Home Use of Assistive/Adaptive Equipment    Equipment Currently Used at Home wheelchair;walker, rolling (P)   -ZT       Row Name 11/01/23 1345          Range of Motion (ROM)    Range of Motion bilateral lower extremities;ROM is WFL (P)   -ZT       Row Name 11/01/23 1345          Strength (Manual Muscle Testing)    Strength (Manual Muscle Testing) bilateral lower extremities;other (see comments) (P)   Bilateral hip flexion 4-/5, BLE 4+/5  -ZT       Row Name 11/01/23 1345          Bed Mobility    Bed Mobility bed mobility (all) activities (P)   -ZT     All Activities, [place] (Bed Mobility) moderate assist (50% patient effort);2 person assist (P)   -ZT       Row Name 11/01/23 1345          Transfers    Transfers sit-stand transfer;stand-sit transfer (P)   -ZT       Row Name 11/01/23 1345          Sit-Stand Transfer    Sit-Stand [place] (Transfers) moderate assist (50% patient effort);1 person assist (P)   -ZT     Assistive Device (Sit-Stand Transfers) walker, front-wheeled (P)   -ZT       Row Name 11/01/23 1345          Stand-Sit Transfer    Stand-Sit [place] (Transfers) moderate assist (50% patient effort);1 person assist (P)   -ZT     Assistive Device (Stand-Sit Transfers) walker, front-wheeled (P)   -ZT       Row Name 11/01/23 1345          Gait/Stairs  (Locomotion)    Gait/Stairs Locomotion other (see comments) (P)   Pt unable to AMB at this time  -ZT       Row Name 11/01/23 1345          Safety Issues, Functional Mobility    Impairments Affecting Function (Mobility) balance;endurance/activity tolerance (P)   -ZT       Row Name 11/01/23 1345          Balance    Balance Assessment standing static balance (P)   -ZT     Static Standing Balance contact guard (P)   -ZT     Position/Device Used, Standing Balance walker, front-wheeled (P)   -ZT       Row Name             Wound 12/02/21 Right anterior foot Incision    Wound - Properties Group Placement Date: 12/02/21  -ES Side: Right  -ES Orientation: anterior  -ES Location: foot  -ES Primary Wound Type: Incision  -ES    Retired Wound - Properties Group Placement Date: 12/02/21  -ES Side: Right  -ES Orientation: anterior  -ES Location: foot  -ES Primary Wound Type: Incision  -ES    Retired Wound - Properties Group Date first assessed: 12/02/21  -ES Side: Right  -ES Location: foot  -ES Primary Wound Type: Incision  -ES      Row Name             Wound 10/17/23 0546 Bilateral perirectal MASD (Moisture associated skin damage)    Wound - Properties Group Placement Date: 10/17/23  -AS Placement Time: 0546  -AS Present on Original Admission: N  -AS Side: Bilateral  -AS Location: perirectal  -AS Primary Wound Type: MASD  -AS    Retired Wound - Properties Group Placement Date: 10/17/23  -AS Placement Time: 0546  -AS Present on Original Admission: N  -AS Side: Bilateral  -AS Location: perirectal  -AS Primary Wound Type: MASD  -AS    Retired Wound - Properties Group Date first assessed: 10/17/23  -AS Time first assessed: 0546  -AS Present on Original Admission: N  -AS Side: Bilateral  -AS Location: perirectal  -AS Primary Wound Type: MASD  -AS      Row Name             Wound 11/01/23 1312 Left anterior second toe Blisters    Wound - Properties Group Placement Date: 11/01/23  -RASHARD Placement Time: 1312  -RASHARD Present on Original  Admission: N  -RASHARD Side: Left  -RASHARD Orientation: anterior  -RASHARD Location: second toe  -RASHARD Primary Wound Type: Blisters  -RASHARD    Retired Wound - Properties Group Placement Date: 11/01/23  -RASHARD Placement Time: 1312 -JP Present on Original Admission: N  -RASHARD Side: Left  -RASHARD Orientation: anterior  -RASHARD Location: second toe  -RASHARD Primary Wound Type: Blisters  -RASHARD    Retired Wound - Properties Group Date first assessed: 11/01/23  -RASHARD Time first assessed: 1312 -JP Present on Original Admission: N  -RASHARD Side: Left  -RASHARD Location: second toe  -RASHARD Primary Wound Type: Blisters  -RASHARD      Row Name 11/01/23 1345          Plan of Care Review    Plan of Care Reviewed With patient (P)   -ZT     Outcome Evaluation Pt presents with decreased BLE strength and activity endurance. He can only tolerate standing for short period of time before his hip pain becomes unbearable and his legs fatigue. He continues to require skilled PT services to address these deficits so that he can safely improve his functional independence. (P)   -ZT       Row Name 11/01/23 1345          Positioning and Restraints    Pre-Treatment Position in bed (P)   -ZT     Post Treatment Position bed (P)   -ZT     In Bed call light within reach;encouraged to call for assist (P)   No bed alarm pre treatment, no bed alarm post treatment  -ZT       Row Name 11/01/23 1341          Therapy Assessment/Plan (PT)    Rehab Potential (PT) fair, will monitor progress closely (P)   -ZT     Criteria for Skilled Interventions Met (PT) yes;skilled treatment is necessary (P)   -ZT     Therapy Frequency (PT) daily (P)   -ZT     Predicted Duration of Therapy Intervention (PT) 10 days (P)   -ZT     Problem List (PT) problems related to;balance;mobility;strength (P)   -ZT     Activity Limitations Related to Problem List (PT) unable to ambulate safely;unable to transfer safely (P)   -ZT       Row Name 11/01/23 7476          Therapy Plan Review/Discharge Plan (PT)    Therapy Plan Review (PT)  evaluation/treatment results reviewed;care plan/treatment goals reviewed;participants included;patient (P)   -ZT       Row Name 11/01/23 1345          Physical Therapy Goals    Bed Mobility Goal Selection (PT) bed mobility, PT goal 1 (P)   -ZT     Transfer Goal Selection (PT) transfer, PT goal 1 (P)   -ZT     Gait Training Goal Selection (PT) gait training, PT goal 1 (P)   -ZT     Strength Goal Selection (PT) strength, PT goal 1 (P)   -ZT       Row Name 11/01/23 4353          Bed Mobility Goal 1 (PT)    Activity/Assistive Device (Bed Mobility Goal 1, PT) bed mobility activities, all (P)   -ZT     [place] Level/Cues Needed (Bed Mobility Goal 1, PT) independent (P)   -ZT     Time Frame (Bed Mobility Goal 1, PT) 10 days (P)   -ZT     Progress/Outcomes (Bed Mobility Goal 1, PT) goal not met;continuing progress toward goal (P)   -ZT       Row Name 11/01/23 9788          Transfer Goal 1 (PT)    Activity/Assistive Device (Transfer Goal 1, PT) transfers, all (P)   -ZT     [place] Level/Cues Needed (Transfer Goal 1, PT) independent (P)   -ZT     Time Frame (Transfer Goal 1, PT) 10 days (P)   -ZT     Progress/Outcome (Transfer Goal 1, PT) goal not met;continuing progress toward goal (P)   -ZT       Row Name 11/01/23 7829          Gait Training Goal 1 (PT)    Activity/Assistive Device (Gait Training Goal 1, PT) gait (walking locomotion);assistive device use (P)   -ZT     [place] Level (Gait Training Goal 1, PT) independent (P)   -ZT     Distance (Gait Training Goal 1, PT) 2 (P)   -ZT     Time Frame (Gait Training Goal 1, PT) 10 days (P)   -ZT     Progress/Outcome (Gait Training Goal 1, PT) goal not met;continuing progress toward goal (P)   -ZT       Row Name 11/01/23 6685          Strength Goal 1 (PT)    Strength Goal 1 (PT) Patient will demonstrate improvement in gross bilateral lower extremity strength to 3+/5 (P)   -ZT     Time Frame (Strength Goal 1, PT) 10 days (P)   -ZT     Progress/Outcome (Strength Goal  1, PT) goal met (P)   -ZT               User Key  (r) = Recorded By, (t) = Taken By, (c) = Cosigned By      Initials Name Provider Type    Karon Jordan, RN Registered Nurse    Angelina Pennington, RN Registered Nurse    Katlyn Garcia RN Registered Nurse    Oscar Ward, PT Student PT Student                    Physical Therapy Education       Title: PT OT SLP Therapies (Done)       Topic: Physical Therapy (Done)       Point: Mobility training (Done)       Learning Progress Summary             Patient Acceptance, E,TB, VU by ZT at 11/1/2023 1352    Acceptance, E, VU,DU by RF at 10/31/2023 1414    Acceptance, E, VU by AV at 10/31/2023 0927    Comment: DC OT services due to lack of progress    Acceptance, E,TB, VU by ZT at 10/24/2023 1157    Acceptance, E, VU,NR by RASHARD at 9/24/2023 0604    Acceptance, E, VU,DU by RF at 9/18/2023 1537    Acceptance, E, VU,DU by RF at 9/17/2023 1515    Acceptance, E, VU,DU by RF at 9/16/2023 1804    Acceptance, E, VU by AV at 9/13/2023 1059    Acceptance, E,TB, VU by  at 9/12/2023 1308                         Point: Home exercise program (Done)       Learning Progress Summary             Patient Acceptance, E, VU,DU by RF at 10/31/2023 1414    Acceptance, E, VU by AV at 10/31/2023 0927    Comment: DC OT services due to lack of progress    Acceptance, E, VU,NR by RASHARD at 9/24/2023 0604    Acceptance, E, VU,DU by RF at 9/18/2023 1537    Acceptance, E, VU,DU by RF at 9/17/2023 1515    Acceptance, E, VU,DU by RF at 9/16/2023 1804    Acceptance, E, VU by AV at 9/13/2023 1059    Acceptance, E,TB, VU by  at 9/12/2023 1308                         Point: Body mechanics (Done)       Learning Progress Summary             Patient Acceptance, E,TB, VU by ZT at 11/1/2023 1352    Acceptance, E, VU,DU by RF at 10/31/2023 1414    Acceptance, E, VU by AV at 10/31/2023 0927    Comment: DC OT services due to lack of progress    Acceptance, E,TB, VU by ZT at 10/24/2023 1152     Acceptance, E, VU,NR by RASHARD at 9/24/2023 0604    Acceptance, E, VU,DU by RF at 9/18/2023 1537    Acceptance, E, VU,DU by RF at 9/17/2023 1515    Acceptance, E, VU,DU by RF at 9/16/2023 1804    Acceptance, E, VU by AV at 9/13/2023 1059    Acceptance, E,TB, VU by  at 9/12/2023 1308                         Point: Precautions (Done)       Learning Progress Summary             Patient Acceptance, E,TB, VU by ZT at 11/1/2023 1352    Acceptance, E, VU,DU by RF at 10/31/2023 1414    Acceptance, E, VU by AV at 10/31/2023 0927    Comment: DC OT services due to lack of progress    Acceptance, E,TB, VU by  at 10/24/2023 1157    Acceptance, E, VU,NR by RASHARD at 9/24/2023 0604    Acceptance, E, VU,DU by RF at 9/18/2023 1537    Acceptance, E, VU,DU by RF at 9/17/2023 1515    Acceptance, E, VU,DU by RF at 9/16/2023 1804    Acceptance, E, VU by AV at 9/13/2023 1059    Acceptance, E,TB, VU by  at 9/12/2023 1308                                         User Key       Initials Effective Dates Name Provider Type Discipline     06/16/21 -  Selena Lindquist, RN Registered Nurse Nurse     06/16/21 -  Uvaldo Christine OT Occupational Therapist OT     01/19/22 -  Karl Baker, RN Registered Nurse Nurse     08/16/23 - 10/31/23 Gamal Simmons, PT Student PT Student PT     09/05/23 -  Oscar Shields, PT Student PT Student PT                  PT Recommendation and Plan  Anticipated Discharge Disposition (PT): (P) inpatient rehabilitation facility  Planned Therapy Interventions (PT): (P) balance training, bed mobility training, gait training, stair training, strengthening, transfer training  Therapy Frequency (PT): (P) daily  Progress Summary (PT)  Progress Toward Functional Goals (PT): progress toward functional goals is fair  Daily Progress Summary (PT): Pt presents in a deconditioned state secondary to recent hospital stay. He tolerated leg exercises better today, but continues to c/o of L hip pain and BLE pain. He continues  to require skilled PT services to address these deficits so that he can improve his independence.  Plan of Care Reviewed With: (P) patient  Outcome Evaluation: (P) Pt presents with decreased BLE strength and activity endurance. He can only tolerate standing for short period of time before his hip pain becomes unbearable and his legs fatigue. He continues to require skilled PT services to address these deficits so that he can safely improve his functional independence.   Outcome Measures       Row Name 11/01/23 1300             How much help from another person do you currently need...    Turning from your back to your side while in flat bed without using bedrails? 4 (P)   -ZT      Moving from lying on back to sitting on the side of a flat bed without bedrails? 3 (P)   -ZT      Moving to and from a bed to a chair (including a wheelchair)? 3 (P)   -ZT      Standing up from a chair using your arms (e.g., wheelchair, bedside chair)? 2 (P)   -ZT      Climbing 3-5 steps with a railing? 1 (P)   -ZT      To walk in hospital room? 1 (P)   -ZT      AM-PAC 6 Clicks Score (PT) 14 (P)   -ZT      Highest level of mobility 4 --> Transferred to chair/commode (P)   -ZT                User Key  (r) = Recorded By, (t) = Taken By, (c) = Cosigned By      Initials Name Provider Type    Oscar Ward PT Student PT Student                     Time Calculation:    PT Charges       Row Name 11/01/23 0925             Time Calculation    PT Received On 11/01/23 (P)   -ZT      PT Goal Re-Cert Due Date 11/10/23 (P)   -ZT         Untimed Charges    PT Eval/Re-eval Minutes 25 (P)   -ZT         Total Minutes    Untimed Charges Total Minutes 25 (P)   -ZT       Total Minutes 25 (P)   -ZT                User Key  (r) = Recorded By, (t) = Taken By, (c) = Cosigned By      Initials Name Provider Type    Oscar Ward PT Student PT Student                      PT G-Codes  Outcome Measure Options: AM-PAC 6 Clicks Daily Activity (OT), Optimal  Instrument  AM-PAC 6 Clicks Score (PT): (P) 14  AM-PAC 6 Clicks Score (OT): 16    Oscar Shields, PT Student  11/1/2023

## 2023-11-02 LAB
GLUCOSE BLDC GLUCOMTR-MCNC: 170 MG/DL (ref 70–99)
GLUCOSE BLDC GLUCOMTR-MCNC: 181 MG/DL (ref 70–99)
GLUCOSE BLDC GLUCOMTR-MCNC: 202 MG/DL (ref 70–99)
GLUCOSE BLDC GLUCOMTR-MCNC: 205 MG/DL (ref 70–99)

## 2023-11-02 PROCEDURE — 63710000001 INSULIN DETEMIR PER 5 UNITS: Performed by: FAMILY MEDICINE

## 2023-11-02 PROCEDURE — 99232 SBSQ HOSP IP/OBS MODERATE 35: CPT | Performed by: INTERNAL MEDICINE

## 2023-11-02 PROCEDURE — 97110 THERAPEUTIC EXERCISES: CPT

## 2023-11-02 PROCEDURE — 82948 REAGENT STRIP/BLOOD GLUCOSE: CPT

## 2023-11-02 PROCEDURE — 63710000001 INSULIN LISPRO (HUMAN) PER 5 UNITS: Performed by: INTERNAL MEDICINE

## 2023-11-02 RX ADMIN — DIPHENOXYLATE HYDROCHLORIDE AND ATROPINE SULFATE 1 TABLET: 2.5; .025 TABLET ORAL at 08:06

## 2023-11-02 RX ADMIN — Medication: at 08:11

## 2023-11-02 RX ADMIN — LISINOPRIL 2.5 MG: 2.5 TABLET ORAL at 08:06

## 2023-11-02 RX ADMIN — APIXABAN 5 MG: 5 TABLET, FILM COATED ORAL at 21:07

## 2023-11-02 RX ADMIN — IBUPROFEN 400 MG: 400 TABLET, FILM COATED ORAL at 06:33

## 2023-11-02 RX ADMIN — HYDROCODONE BITARTRATE AND ACETAMINOPHEN 1 TABLET: 7.5; 325 TABLET ORAL at 13:43

## 2023-11-02 RX ADMIN — BACLOFEN 10 MG: 10 TABLET ORAL at 00:44

## 2023-11-02 RX ADMIN — INSULIN DETEMIR 65 UNITS: 100 INJECTION, SOLUTION SUBCUTANEOUS at 21:49

## 2023-11-02 RX ADMIN — CYANOCOBALAMIN TAB 500 MCG 1000 MCG: 500 TAB at 08:06

## 2023-11-02 RX ADMIN — HYDROCODONE BITARTRATE AND ACETAMINOPHEN 1 TABLET: 7.5; 325 TABLET ORAL at 00:43

## 2023-11-02 RX ADMIN — HYDROCODONE BITARTRATE AND ACETAMINOPHEN 1 TABLET: 7.5; 325 TABLET ORAL at 07:18

## 2023-11-02 RX ADMIN — INSULIN LISPRO 5 UNITS: 100 INJECTION, SOLUTION INTRAVENOUS; SUBCUTANEOUS at 08:05

## 2023-11-02 RX ADMIN — FAMOTIDINE 40 MG: 20 TABLET, FILM COATED ORAL at 08:06

## 2023-11-02 RX ADMIN — Medication 250 MG: at 21:07

## 2023-11-02 RX ADMIN — INSULIN LISPRO 5 UNITS: 100 INJECTION, SOLUTION INTRAVENOUS; SUBCUTANEOUS at 17:13

## 2023-11-02 RX ADMIN — INSULIN LISPRO 8 UNITS: 100 INJECTION, SOLUTION INTRAVENOUS; SUBCUTANEOUS at 12:23

## 2023-11-02 RX ADMIN — HYDROCORTISONE 1 APPLICATION: 1 OINTMENT TOPICAL at 08:09

## 2023-11-02 RX ADMIN — INSULIN LISPRO 5 UNITS: 100 INJECTION, SOLUTION INTRAVENOUS; SUBCUTANEOUS at 12:24

## 2023-11-02 RX ADMIN — ATORVASTATIN CALCIUM 10 MG: 10 TABLET, FILM COATED ORAL at 21:07

## 2023-11-02 RX ADMIN — PREGABALIN 75 MG: 75 CAPSULE ORAL at 08:06

## 2023-11-02 RX ADMIN — INSULIN LISPRO 4 UNITS: 100 INJECTION, SOLUTION INTRAVENOUS; SUBCUTANEOUS at 17:13

## 2023-11-02 RX ADMIN — Medication 250 MG: at 08:06

## 2023-11-02 RX ADMIN — IBUPROFEN 400 MG: 400 TABLET, FILM COATED ORAL at 13:43

## 2023-11-02 RX ADMIN — BACLOFEN 10 MG: 10 TABLET ORAL at 11:04

## 2023-11-02 RX ADMIN — PREGABALIN 75 MG: 75 CAPSULE ORAL at 21:07

## 2023-11-02 RX ADMIN — APIXABAN 5 MG: 5 TABLET, FILM COATED ORAL at 08:06

## 2023-11-02 RX ADMIN — BACITRACIN 0.9 G: 500 OINTMENT TOPICAL at 08:06

## 2023-11-02 RX ADMIN — PREGABALIN 75 MG: 75 CAPSULE ORAL at 17:13

## 2023-11-02 RX ADMIN — HYDROCODONE BITARTRATE AND ACETAMINOPHEN 1 TABLET: 7.5; 325 TABLET ORAL at 21:07

## 2023-11-02 RX ADMIN — BACLOFEN 10 MG: 10 TABLET ORAL at 21:07

## 2023-11-02 RX ADMIN — INSULIN DETEMIR 70 UNITS: 100 INJECTION, SOLUTION SUBCUTANEOUS at 08:05

## 2023-11-02 RX ADMIN — INSULIN LISPRO 8 UNITS: 100 INJECTION, SOLUTION INTRAVENOUS; SUBCUTANEOUS at 21:50

## 2023-11-02 RX ADMIN — BACITRACIN 0.9 G: 500 OINTMENT TOPICAL at 21:09

## 2023-11-02 RX ADMIN — INSULIN LISPRO 4 UNITS: 100 INJECTION, SOLUTION INTRAVENOUS; SUBCUTANEOUS at 08:05

## 2023-11-02 RX ADMIN — EMPAGLIFLOZIN 10 MG: 10 TABLET, FILM COATED ORAL at 08:06

## 2023-11-02 RX ADMIN — IBUPROFEN 400 MG: 400 TABLET, FILM COATED ORAL at 21:07

## 2023-11-02 NOTE — PLAN OF CARE
Goal Outcome Evaluation:      Patient a&ox4. Medicated for c/o pain per MAR. blood glucose monitored as ordered, supplemental insulin administered per MAR.

## 2023-11-02 NOTE — PROGRESS NOTES
[facility]   Hospitalist Progress Note  Date: 2023  Patient Name: Jose Shaikh  : 1960  MRN: [id number]  Date of admission: 9/10/2023      Subjective   Subjective     Chief Complaint: Follow-up weakness    Summary:Jose Shaikh is a 63 y.o. male  initially hospitalized on 9/10/2023, prolonged hospitalization for treatment of management of generalized weakness, deconditioning, with acute issues of diarrhea, C. difficile negative.  Diarrhea improved.  Had small hematoma after ambulating on his foot.  Unfortunately with exhaustive efforts to try to place this gentleman after 39 days of hospitalization, we are unable to find a facility that will accept him.  He has no further acute needs or requirements for inpatient monitoring and management, his labs and vitals are stable, he is tolerating oral intake, and has been refusing turns and repositionings and other interventions with nursing staff despite recommendations.  Patient discharged in hemodynamically stable addition on 10/19/2023 to follow-up with PCP within 1 week.  Unfortunately we cannot solve all of his social issues during this hospitalization due to lack of resources and participation from the patient's perspective despite all our efforts.  Patient has a financial means of making arrangements at home.  Patient appealed his discharge.  Lost his appeal.  LCD: 10/20/23, PFL beginning: 10/21/23 @ 12pm.  Discharge planning continues to work on inpatient placement.    Interval Followup:   Stood with physical therapy yesterday, stood again today    Objective   Objective     Vitals:   Temp:  [97.2 °F (36.2 °C)-98.6 °F (37 °C)] 98.6 °F (37 °C)  Heart Rate:  [83-93] 84  Resp:  [18-20] 20  BP: (113-139)/(48-79) 136/79  Physical Exam   GEN: No acute distress  HEENT: Moist mucous membranes  LUNGS: Equal chest rise bilaterally  CARDIAC: Regular rate and rhythm  NEURO: Moving all 4 extremities spontaneously  SKIN: No obvious breakdown    Result Review     Result Review:  I have personally reviewed these results and agree with these findings:  [x]  Laboratory  LAB RESULTS:                                    Brief Urine Lab Results  (Last result in the past 365 days)        Color   Clarity   Blood   Leuk Est   Nitrite   Protein   CREAT   Urine HCG        09/11/23 2200 Dark Yellow   Clear   Negative   Negative   Negative   Negative                 Microbiology Results (last 10 days)       ** No results found for the last 240 hours. **            []  Microbiology  []  Radiology  No radiology results for the last 7 days    []  EKG/Telemetry   []  Cardiology/Vascular   []  Pathology  []  Old records  [x]  Other:  Scheduled Meds:ammonium lactate, , Topical, Daily  apixaban, 5 mg, Oral, Q12H  atorvastatin, 10 mg, Oral, Nightly  bacitracin, 1 application , Topical, BID  cholestyramine, 1 packet, Oral, Q12H  empagliflozin, 10 mg, Oral, Daily  famotidine, 40 mg, Oral, Daily  insulin detemir, 65 Units, Subcutaneous, Nightly  insulin detemir, 70 Units, Subcutaneous, Daily  insulin lispro, 4-24 Units, Subcutaneous, 4x Daily AC & at Bedtime  Insulin Lispro, 5 Units, Subcutaneous, TID With Meals  lisinopril, 2.5 mg, Oral, Q24H  pregabalin, 75 mg, Oral, TID  saccharomyces boulardii, 250 mg, Oral, BID  sodium chloride, 10 mL, Intravenous, Q12H  vitamin B-12, 1,000 mcg, Oral, Daily      Continuous Infusions:   PRN Meds:.  acetaminophen    baclofen    senna-docusate sodium **AND** polyethylene glycol **AND** bisacodyl **AND** bisacodyl    dextrose    dextrose    diphenoxylate-atropine    glucagon (human recombinant)    HYDROcodone-acetaminophen    hydrocortisone-bacitracin-zinc oxide-nystatin    ibuprofen    ondansetron    ondansetron ODT    simethicone    sodium chloride    sodium chloride      Assessment & Plan   Assessment / Plan     Assessment/Plan:  Assessment:  Generalized weakness  Deconditioning  Type 2 diabetes mellitus  Essential hypertension  Chronic paroxysmal atrial  fibrillation on Eliquis  Obesity (BMI: 42.88)  Debility with wheelchair dependence  Severe degenerative joint disease of lower extremities  Chronic wound  Thrombocytopenia     Plan:  Patient was discharged initially on 10/19/2023, patient appealed discharge, lost appeal, initial discharge order was canceled by accident and reentered on 10/21/2023, patient remains discharged as of 10/19/2023 but refuses to leave.  PFL beginning 10/21/2023 at 12 PM.  Continue to work with  to determine next steps  Encourage patient to mobilize with therapists and staff  Continue blood sugar control with current regimen Levemir twice daily and titrate up by 5 unites every 3 days while fasting blood sugar greater than 130. This can be done as an outpatient.  Patient receiving 70 units of Levemir currently  Continue sliding scale Humalog   Change diet to carb consistent  Continue Florastor  Continue as needed Imodium and scheduled Questran  Continue other medications as ordered  PT/OT/ consulted and following         Discussed plan with bedside RN and .    Per discharge planning patient not being seen by PT/OT due to discharge order being present and patient needs to work with PT/OT for inpatient rehab placement.Discharge order removed for that reason only. Patient remains stable for discharge.    DVT prophylaxis:  Medical DVT prophylaxis orders are present.    CODE STATUS:   Code Status (Patient has no pulse and is not breathing): CPR (Attempt to Resuscitate)  Medical Interventions (Patient has pulse or is breathing): Full Support        Electronically signed by Max Mehta MD, 11/02/23, 11:05 AM EDT.

## 2023-11-02 NOTE — PLAN OF CARE
Goal Outcome Evaluation:  Plan of Care Reviewed With: patient        Progress: no change  Outcome Evaluation: patient medicated per orders. skin and wound care provided per orders. blood glucose monitored this shift. patient refused to be turned and repositioned this shift. PT seen patient this shift. weight updated this shift. No new issues/needs at this time.

## 2023-11-02 NOTE — THERAPY TREATMENT NOTE
Acute Care - Physical Therapy Treatment Note  KEO Dominguez     Patient Name: Jose Shaikh  : 1960  MRN: [id number]  Today's Date: 2023      Visit Dx:     ICD-10-CM ICD-9-CM   1. Generalized weakness  R53.1 780.79   2. Hyponatremia  E87.1 276.1   3. Difficulty in walking  R26.2 719.7   4. Decreased activities of daily living (ADL)  Z78.9 V49.89   5. Difficulty walking  R26.2 719.7     Patient Active Problem List   Diagnosis    Diabetic ulcer of left heel associated with type 2 DM    Acute osteomyelitis of left calcaneus     Diabetic ulcer of left heel associated with type 2 DM    Diabetic ulcer of right midfoot associated with type 2 DM    Paroxysmal atrial fibrillation    Essential hypertension    Hyperlipidemia LDL goal <100    Cellulitis and abscess of foot    High alkaline phosphatase level    Osteomyelitis    Onychomycosis    Onychocryptosis    Foot pain, bilateral    Osteomyelitis of foot, right, acute    Cellulitis of right foot    Type 2 diabetes mellitus, with long-term current use of insulin    Class 3 severe obesity due to excess calories with serious comorbidity and body mass index (BMI) of 45.0 to 49.9 in adult    Anxiety disorder, unspecified    Claustrophobia    Dependence on wheelchair    Depression, unspecified    Long term (current) use of anticoagulants    Long term (current) use of oral hypoglycemic drugs    Wound of foot    Non-prs chronic ulcer oth prt r foot limited to brkdwn skin    Orthostatic hypotension    Other chronic osteomyelitis, right ankle and foot    Personal history of nicotine dependence    Thrombocytopenia, unspecified    Unspecified open wound, right foot, initial encounter    Diabetic foot infection    Subacute osteomyelitis of right foot    Right foot pain    Sepsis    Onychomycosis    Foot pain, left    Impaired mobility and ADLs    Absence of toe of right foot    Corns and callosity    Disability of walking    Fracture    Limb swelling    Polyneuropathy     Pressure ulcer, stage 1    Shortness of breath    Generalized weakness     Past Medical History:   Diagnosis Date    Absence of toe of right foot     Acute osteomyelitis of left calcaneus  8/18/2021    Anxiety and depression     Arthritis     Claustrophobia     Corns and callus     Diabetic ulcer of left heel associated with type 2 DM 8/18/2021    Diabetic ulcer of left heel associated with type 2 DM 7/6/2021    Diabetic ulcer of right midfoot associated with type 2 DM 8/18/2021    Difficulty walking     Essential hypertension 8/31/2021    Hammertoe     Hyperlipidemia LDL goal <100 8/31/2021    Ingrown toenail     Obesity     Paroxysmal atrial fibrillation 8/31/2021    Polyneuropathy     Pressure ulcer, stage 1     Tinea unguium     Type 2 diabetes mellitus with polyneuropathy      Past Surgical History:   Procedure Laterality Date    CYST REMOVAL      center of back; benign    INCISION AND DRAINAGE ABSCESS      back    INCISION AND DRAINAGE LEG Right 12/10/2021    Procedure: INCISION AND DRAINAGE LOWER EXTREMITY;  Surgeon: Ash Leyva DPM;  Location: [facility];  Service: Podiatry;  Laterality: Right;    OTHER SURGICAL HISTORY      Surgical clips left foot    TOE SURGERY Right     Removal of 5th toe    TRANS METATARSAL AMPUTATION Right 12/2/2021    Procedure: AMPUTATION TRANS METATARSAL;  Surgeon: Ash Leyva DPM;  Location: [facility] OR;  Service: Podiatry;  Laterality: Right;    WRIST SURGERY Left     repair of injury     PT Assessment (last 12 hours)       PT Evaluation and Treatment       Row Name 11/02/23 1300          Physical Therapy Time and Intention    Subjective Information no complaints;weakness;fatigue;pain (P)   -ZT     Document Type therapy note (daily note) (P)   -ZT     Mode of Treatment individual therapy;physical therapy (P)   -ZT     Patient Effort good (P)   -ZT       Row Name 11/02/23 1300          General Information    Patient Profile Reviewed yes (P)   -ZT      Patient Observations alert;cooperative;agree to therapy (P)   -     Existing Precautions/Restrictions fall (P)   -       Row Name 11/02/23 1300          Bed Mobility    Bed Mobility bed mobility (all) activities (P)   -ZT     All Activities, [place] (Bed Mobility) moderate assist (50% patient effort);2 person assist (P)   -       Row Name 11/02/23 1300          Transfers    Transfers sit-stand transfer;stand-sit transfer (P)   -       Row Name 11/02/23 1300          Sit-Stand Transfer    Sit-Stand [place] (Transfers) moderate assist (50% patient effort);1 person assist (P)   -ZT     Assistive Device (Sit-Stand Transfers) other (see comments) (P)   [facility]  -       Row Name 11/02/23 1300          Stand-Sit Transfer    Stand-Sit [place] (Transfers) moderate assist (50% patient effort);1 person assist (P)   -ZT     Assistive Device (Stand-Sit Transfers) other (see comments) (P)   Scale [facility]  -       Row Name 11/02/23 1300          Gait/Stairs (Locomotion)    Gait/Stairs Locomotion other (see comments) (P)   Pt unable to AMB at this time  -       Row Name 11/02/23 1300          Safety Issues, Functional Mobility    Impairments Affecting Function (Mobility) balance;endurance/activity tolerance (P)   -       Row Name 11/02/23 1300          Balance    Balance Assessment standing static balance (P)   -ZT     Static Standing Balance standby assist (P)   -ZT     Position/Device Used, Standing Balance other (see comments) (P)   [facility]  -       Row Name 11/02/23 1300          Hip (Therapeutic Exercise)    Hip Strengthening (Therapeutic Exercise) aBduction;aDduction;20 repititions (P)   -       Row Name 11/02/23 1300          Knee (Therapeutic Exercise)    Knee Strengthening (Therapeutic Exercise) SLR (straight leg raise);10 repetitions;other (see comments);20 repititions (P)   Pt performed 20 reps of quad sets and 10 reps of SLR  -       Row Name 11/02/23 1300          Ankle  (Therapeutic Exercise)    Ankle Strengthening (Therapeutic Exercise) dorsiflexion;plantarflexion;20 repititions;other (see comments) (P)   Pt performed 20 reps of ankle pumps  -ZT       Row Name             Wound 12/02/21 Right anterior foot Incision    Wound - Properties Group Placement Date: 12/02/21  -ES Side: Right  -ES Orientation: anterior  -ES Location: foot  -ES Primary Wound Type: Incision  -ES    Retired Wound - Properties Group Placement Date: 12/02/21  -ES Side: Right  -ES Orientation: anterior  -ES Location: foot  -ES Primary Wound Type: Incision  -ES    Retired Wound - Properties Group Date first assessed: 12/02/21  -ES Side: Right  -ES Location: foot  -ES Primary Wound Type: Incision  -ES      Row Name             Wound 10/17/23 0546 Bilateral perirectal MASD (Moisture associated skin damage)    Wound - Properties Group Placement Date: 10/17/23  -AS Placement Time: 0546  -AS Present on Original Admission: N  -AS Side: Bilateral  -AS Location: perirectal  -AS Primary Wound Type: MASD  -AS    Retired Wound - Properties Group Placement Date: 10/17/23  -AS Placement Time: 0546  -AS Present on Original Admission: N  -AS Side: Bilateral  -AS Location: perirectal  -AS Primary Wound Type: MASD  -AS    Retired Wound - Properties Group Date first assessed: 10/17/23  -AS Time first assessed: 0546  -AS Present on Original Admission: N  -AS Side: Bilateral  -AS Location: perirectal  -AS Primary Wound Type: MASD  -AS      Row Name             Wound 11/01/23 1312 Left anterior second toe Blisters    Wound - Properties Group Placement Date: 11/01/23  -RASHARD Placement Time: 1312  -RASHARD Present on Original Admission: N  -RASHARD Side: Left  -RASHARD Orientation: anterior  -RASHARD Location: second toe  -RASHARD Primary Wound Type: Blisters  -RASHARD    Retired Wound - Properties Group Placement Date: 11/01/23  -RASHARD Placement Time: 1312  -RASHARD Present on Original Admission: N  -RASHARD Side: Left  -RASHARD Orientation: anterior  -RASHARD Location: second toe  -RASHARD  Primary Wound Type: Blisters  -RASHARD    Retired Wound - Properties Group Date first assessed: 11/01/23  -RASHARD Time first assessed: 1312 -JP Present on Original Admission: N  -RASHARD Side: Left  -RASHARD Location: second toe  -RASHARD Primary Wound Type: Blisters  -RASHARD      Row Name 11/02/23 1300          Plan of Care Review    Plan of Care Reviewed With patient (P)   -ZT       Row Name 11/02/23 1300          Positioning and Restraints    Pre-Treatment Position in bed (P)   -ZT     Post Treatment Position bed (P)   -ZT     In Bed call light within reach;encouraged to call for assist;exit alarm on (P)   -ZT       Row Name 11/02/23 1300          Therapy Assessment/Plan (PT)    Rehab Potential (PT) fair, will monitor progress closely (P)   -ZT     Criteria for Skilled Interventions Met (PT) yes;skilled treatment is necessary (P)   -ZT     Therapy Frequency (PT) daily (P)   -ZT     Predicted Duration of Therapy Intervention (PT) 10 days (P)   -ZT     Problem List (PT) problems related to;balance;mobility;strength (P)   -ZT     Activity Limitations Related to Problem List (PT) unable to ambulate safely;unable to transfer safely (P)   -ZT       Row Name 11/02/23 1300          Progress Summary (PT)    Progress Toward Functional Goals (PT) progress toward functional goals is fair (P)   -ZT     Daily Progress Summary (PT) Pt presents with decreased strength, endurance, and activity tolerance. He was able to stand twice today for roughly 1 minute. He continues to require skilled PT services to address these deficits so that he can improve his functional independence. (P)   -ZT       Row Name 11/02/23 1300          Therapy Plan Review/Discharge Plan (PT)    Therapy Plan Review (PT) evaluation/treatment results reviewed;care plan/treatment goals reviewed;participants included;patient (P)   -ZT               User Key  (r) = Recorded By, (t) = Taken By, (c) = Cosigned By      Initials Name Provider Type    Karon Jordan RN Registered  Nurse    Angelina Pennington, RN Registered Nurse    Katlyn Garcia RN Registered Nurse    Oscar Ward, PT Student PT Student                    Physical Therapy Education       Title: PT OT SLP Therapies (Done)       Topic: Physical Therapy (Done)       Point: Mobility training (Done)       Learning Progress Summary             Patient Acceptance, E,TB, VU by ZT at 11/1/2023 1352    Acceptance, E, VU,DU by RF at 10/31/2023 1414    Acceptance, E, VU by AV at 10/31/2023 0927    Comment: DC OT services due to lack of progress    Acceptance, E,TB, VU by ZT at 10/24/2023 1157    Acceptance, E, VU,NR by RASHARD at 9/24/2023 0604    Acceptance, E, VU,DU by  at 9/18/2023 1537    Acceptance, E, VU,DU by RF at 9/17/2023 1515    Acceptance, E, VU,DU by RF at 9/16/2023 1804    Acceptance, E, VU by AV at 9/13/2023 1059    Acceptance, E,TB, VU by  at 9/12/2023 1308                         Point: Home exercise program (Done)       Learning Progress Summary             Patient Acceptance, E, VU,DU by RF at 10/31/2023 1414    Acceptance, E, VU by AV at 10/31/2023 0927    Comment: DC OT services due to lack of progress    Acceptance, E, VU,NR by RASHARD at 9/24/2023 0604    Acceptance, E, VU,DU by RF at 9/18/2023 1537    Acceptance, E, VU,DU by RF at 9/17/2023 1515    Acceptance, E, VU,DU by RF at 9/16/2023 1804    Acceptance, E, VU by AV at 9/13/2023 1059    Acceptance, E,TB, VU by  at 9/12/2023 1308                         Point: Body mechanics (Done)       Learning Progress Summary             Patient Acceptance, E,TB, VU by ZT at 11/1/2023 1352    Acceptance, E, VU,DU by RF at 10/31/2023 1414    Acceptance, E, VU by AV at 10/31/2023 0927    Comment: DC OT services due to lack of progress    Acceptance, E,TB, VU by  at 10/24/2023 1157    Acceptance, E, VU,NR by RASHARD at 9/24/2023 0604    Acceptance, E, VU,DU by RF at 9/18/2023 1537    Acceptance, E, VU,DU by RF at 9/17/2023 1515    Acceptance, E, VU,DU by RF at 9/16/2023 1804     Acceptance, E, VU by AV at 9/13/2023 1059    Acceptance, E,TB, VU by  at 9/12/2023 1308                         Point: Precautions (Done)       Learning Progress Summary             Patient Acceptance, E,TB, VU by ZT at 11/1/2023 1352    Acceptance, E, VU,DU by RF at 10/31/2023 1414    Acceptance, E, VU by AV at 10/31/2023 0927    Comment: DC OT services due to lack of progress    Acceptance, E,TB, VU by ZT at 10/24/2023 1157    Acceptance, E, VU,NR by RASHARD at 9/24/2023 0604    Acceptance, E, VU,DU by RF at 9/18/2023 1537    Acceptance, E, VU,DU by RF at 9/17/2023 1515    Acceptance, E, VU,DU by RF at 9/16/2023 1804    Acceptance, E, VU by AV at 9/13/2023 1059    Acceptance, E,TB, VU by  at 9/12/2023 1308                                         User Key       Initials Effective Dates Name Provider Type Discipline    RASHARD 06/16/21 -  Selena Lindquist, RN Registered Nurse Nurse    AV 06/16/21 -  Uvaldo Christine, OT Occupational Therapist OT     01/19/22 -  Karl Baker, RN Registered Nurse Nurse     08/16/23 - 10/31/23 Gamal Simmons, PT Student PT Student PT     09/05/23 -  Oscar Shields, PT Student PT Student PT                  PT Recommendation and Plan  Anticipated Discharge Disposition (PT): (P) inpatient rehabilitation facility  Planned Therapy Interventions (PT): (P) balance training, bed mobility training, gait training, stair training, strengthening, transfer training  Therapy Frequency (PT): (P) daily  Progress Summary (PT)  Progress Toward Functional Goals (PT): (P) progress toward functional goals is fair  Daily Progress Summary (PT): (P) Pt presents with decreased strength, endurance, and activity tolerance. He was able to stand twice today for roughly 1 minute. He continues to require skilled PT services to address these deficits so that he can improve his functional independence.  Plan of Care Reviewed With: (P) patient  Outcome Evaluation: Pt presents with decreased BLE strength and  activity endurance. He can only tolerate standing for short period of time before his hip pain becomes unbearable and his legs fatigue. He continues to require skilled PT services to address these deficits so that he can safely improve his functional independence.   Outcome Measures       Row Name 11/02/23 1300 11/01/23 1300          How much help from another person do you currently need...    Turning from your back to your side while in flat bed without using bedrails? 4 (P)   -ZT 4  -MOE (r) ZT (t) MOE (c)     Moving from lying on back to sitting on the side of a flat bed without bedrails? 4 (P)   -ZT 3  -MOE (r) ZT (t) MOE (c)     Moving to and from a bed to a chair (including a wheelchair)? 2 (P)   -ZT 3  -MOE (r) ZT (t) MOE (c)     Standing up from a chair using your arms (e.g., wheelchair, bedside chair)? 2 (P)   -ZT 2  -MOE (r) ZT (t) MOE (c)     Climbing 3-5 steps with a railing? 1 (P)   -ZT 1  -MOE (r) ZT (t) MOE (c)     To walk in hospital room? 1 (P)   -ZT 1  -MOE (r) ZT (t) MEO (c)     AM-PAC 6 Clicks Score (PT) 14 (P)   -ZT 14  -MOE (r) ZT (t)     Highest level of mobility 4 --> Transferred to chair/commode (P)   -ZT 4 --> Transferred to chair/commode  -MOE (r) ZT (t)               User Key  (r) = Recorded By, (t) = Taken By, (c) = Cosigned By      Initials Name Provider Type    MOEMerlin Muñiz, PT Physical Therapist    ZT Oscar Shields, PT Student PT Student                     Time Calculation:    PT Charges       Row Name 11/02/23 1300             Time Calculation    PT Received On 11/02/23 (P)   -ZT         Timed Charges    04126 - PT Therapeutic Exercise Minutes 15 (P)   -ZT         Total Minutes    Timed Charges Total Minutes 15 (P)   -ZT       Total Minutes 15 (P)   -ZT                User Key  (r) = Recorded By, (t) = Taken By, (c) = Cosigned By      Initials Name Provider Type    ZT Meghan Shieldschary, PT Student PT Student                  Therapy Charges for Today       Code Description Service Date  Service Provider Modifiers Qty    51201574074 HC PT RE-EVAL ESTABLISHED PLAN 2 11/1/2023 Oscar Shields, PT Student GP 1            PT G-Codes  Outcome Measure Options: AM-PAC 6 Clicks Daily Activity (OT), Optimal Instrument  AM-PAC 6 Clicks Score (PT): (P) 14  AM-PAC 6 Clicks Score (OT): 16    Oscar Shields, PT Student  11/2/2023

## 2023-11-02 NOTE — CONSULTS
"Nutrition Services    Patient Name: Jose Shaikh  YOB: 1960  MRN: [id number]  Admission date: 9/10/2023      CLINICAL NUTRITION ASSESSMENT      Reason for Assessment  Follow-up protocol     H&P:    Past Medical History:   Diagnosis Date    Absence of toe of right foot     Acute osteomyelitis of left calcaneus  8/18/2021    Anxiety and depression     Arthritis     Claustrophobia     Corns and callus     Diabetic ulcer of left heel associated with type 2 DM 8/18/2021    Diabetic ulcer of left heel associated with type 2 DM 7/6/2021    Diabetic ulcer of right midfoot associated with type 2 DM 8/18/2021    Difficulty walking     Essential hypertension 8/31/2021    Hammertoe     Hyperlipidemia LDL goal <100 8/31/2021    Ingrown toenail     Obesity     Paroxysmal atrial fibrillation 8/31/2021    Polyneuropathy     Pressure ulcer, stage 1     Tinea unguium     Type 2 diabetes mellitus with polyneuropathy         Current Problems:   Active Hospital Problems    Diagnosis     **Generalized weakness     Type 2 diabetes mellitus, with long-term current use of insulin     Class 3 severe obesity due to excess calories with serious comorbidity and body mass index (BMI) of 45.0 to 49.9 in adult     Dependence on wheelchair     Foot pain, bilateral     Paroxysmal atrial fibrillation     Essential hypertension     Diabetic ulcer of left heel associated with type 2 DM         Nutrition/Diet History         Narrative     62-year-old male admitted for generalized weakness.  Status post amputation of toes, to right foot, following osteomyelitis.      Nutrition follow up.  Patient continues to consume 100% of most meals.  No nutrition interventions indicated at this time.       Anthropometrics        Current Height, Weight Height: 188 cm (74\")  Weight:  (refused to stand to weigh, explained need weight)   Current BMI Body mass index is 42.88 kg/m².       Weight Hx  Wt Readings from Last 30 Encounters:   09/11/23 0030 (!) " 151 kg (334 lb)   09/10/23 1844 (!) 154 kg (338 lb 10 oz)   08/30/23 1112 (!) 157 kg (347 lb)   08/01/23 0932 (!) 158 kg (347 lb 10.7 oz)   07/31/23 2347 (!) 163 kg (358 lb 7.5 oz)   06/16/23 2333 (!) 155 kg (342 lb 2.5 oz)   06/16/23 1053 (!) 155 kg (342 lb 3.2 oz)   06/15/23 0505 (!) 149 kg (328 lb 14.4 oz)   06/14/23 0455 (!) 149 kg (328 lb 4.8 oz)   06/13/23 1703 (!) 149 kg (328 lb 11.2 oz)   06/13/23 0600 (!) 170 kg (374 lb 12.5 oz)   06/12/23 0420 (!) 160 kg (352 lb 11.8 oz)   06/11/23 0447 (!) 150 kg (331 lb 5.6 oz)   06/10/23 0500 (!) 150 kg (330 lb 14.6 oz)   06/09/23 0536 (!) 156 kg (343 lb 7.6 oz)   06/08/23 1826 (!) 156 kg (344 lb 11.2 oz)   06/08/23 0522 (!) 158 kg (348 lb 8.8 oz)   06/07/23 0548 (!) 155 kg (342 lb 9.5 oz)   06/06/23 0515 (!) 155 kg (341 lb 14.9 oz)   06/05/23 0445 (!) 155 kg (341 lb 9.6 oz)   06/05/23 0348 (!) 158 kg (349 lb 3.3 oz)   06/04/23 0555 (!) 157 kg (346 lb 3.2 oz)   06/03/23 0539 (!) 158 kg (349 lb)   06/02/23 0548 (!) 163 kg (359 lb 3.2 oz)   06/01/23 0600 (!) 164 kg (362 lb)   05/31/23 0507 (!) 164 kg (362 lb 4.8 oz)   05/30/23 0635 (!) 164 kg (362 lb 7 oz)   05/29/23 0500 (!) 167 kg (368 lb 2.7 oz)   05/28/23 0600 (!) 167 kg (367 lb 11.6 oz)   05/27/23 0600 (!) 167 kg (369 lb 0.8 oz)   05/26/23 0529 (!) 167 kg (369 lb 3.2 oz)   05/25/23 0600 (!) 168 kg (370 lb 3.2 oz)   05/24/23 0600 (!) 166 kg (366 lb 9.6 oz)   05/22/23 0529 (!) 169 kg (373 lb 7.4 oz)   05/21/23 0600 (!) 169 kg (371 lb 12.8 oz)   05/20/23 0600 (!) 171 kg (376 lb 5.2 oz)   05/19/23 0300 (!) 168 kg (370 lb 14.4 oz)   05/18/23 1912 (!) 168 kg (371 lb 7.6 oz)   05/18/23 0600 (!) 169 kg (371 lb 11.1 oz)   05/16/23 0700 (!) 171 kg (377 lb 4.8 oz)   05/14/23 0500 (!) 171 kg (377 lb 3.3 oz)   05/12/23 1143 (!) 170 kg (375 lb)   05/06/23 0258 (!) 170 kg (375 lb 8 oz)   04/19/23 0909 (!) 163 kg (359 lb)   04/03/23 1906 (!) 168 kg (370 lb)   03/27/23 0938 (!) 170 kg (373 lb 10.9 oz)   03/17/23 1153 (!) 168 kg (370  "lb)   01/27/23 1501 (!) 168 kg (370 lb)   12/22/22 1501 (!) 171 kg (376 lb)   11/08/22 1035 (!) 161 kg (355 lb)   10/01/22 1141 (!) 164 kg (360 lb 10.8 oz)   05/18/22 1311 (!) 155 kg (341 lb)   03/24/22 1432 (!) 155 kg (341 lb)   03/02/22 1412 (!) 155 kg (341 lb)   01/12/22 1317 (!) 155 kg (341 lb)   12/30/21 1431 (!) 155 kg (341 lb)   12/01/21 1144 (!) 155 kg (341 lb 11.4 oz)   12/01/21 0843 (!) 157 kg (346 lb)   11/22/21 0839 (!) 157 kg (346 lb)   11/15/21 1148 (!) 157 kg (346 lb 12.5 oz)   11/09/21 1139 (!) 157 kg (345 lb 7.4 oz)   11/05/21 1130 (!) 159 kg (351 lb 3.1 oz)   11/02/21 1121 (!) 161 kg (356 lb)   10/27/21 1048 (!) 161 kg (356 lb)   10/21/21 1418 (!) 162 kg (356 lb 14.8 oz)   10/20/21 1120 (!) 160 kg (353 lb)            Wt Change Observation Trending down, -18.5L fluid balance this admission.     Estimated/Assessed Needs       Energy Requirements Estimated nutrient needs using adj body weight 99.4 kg   EST Needs (kcal/day) 25 kcals per kilogram = 2485 aracelis       Protein Requirements    EST Daily Needs (g/day) 1.0 to 1.2 g/kg =  g of protein       Fluid Requirements     Estimated Needs (mL/day) 25 mL/kg = 2485 mL (11 to 12 cups)     Labs/Medications         Pertinent Labs Reviewed.         Invalid input(s): \"LABALBU\", \"PROT\"          COVID19   Date Value Ref Range Status   05/12/2023 Not Detected Not Detected - Ref. Range Final     Lab Results   Component Value Date    HGBA1C 6.60 (H) 04/19/2023         Pertinent Medications Reviewed.     Current Nutrition Orders & Evaluation of Intake       Oral Nutrition     Current PO Diet Diet: Diabetic Diets; Consistent Carbohydrate; Texture: Regular Texture (IDDSI 7); Fluid Consistency: Thin (IDDSI 0)   Supplement No active supplement orders       Malnutrition Severity Assessment                Nutrition Diagnosis         Nutrition Dx Problem 1 Increased nutrient needs related to increased nutrient needs due to catabolic disease as evidenced by  Non healing " foot ulcer.       Nutrition Intervention         Consistent Carbohydrate Diet, regular texture, thin liquids  High protein diet.     Medical Nutrition Therapy/Nutrition Education          Learner     Readiness Patient  Education not indicated at this time     Method     Response N/A  N/A     Monitor/Evaluation        Monitor Per protocol, PO intake, Weight, Skin status, POC/GOC       Nutrition Discharge Plan         To be determined       Electronically signed by:  Kacey Montoya RD  11/02/23 14:51 EDT

## 2023-11-03 LAB
GLUCOSE BLDC GLUCOMTR-MCNC: 157 MG/DL (ref 70–99)
GLUCOSE BLDC GLUCOMTR-MCNC: 172 MG/DL (ref 70–99)
GLUCOSE BLDC GLUCOMTR-MCNC: 188 MG/DL (ref 70–99)
GLUCOSE BLDC GLUCOMTR-MCNC: 42 MG/DL (ref 70–99)

## 2023-11-03 PROCEDURE — 82948 REAGENT STRIP/BLOOD GLUCOSE: CPT

## 2023-11-03 PROCEDURE — 63710000001 INSULIN DETEMIR PER 5 UNITS: Performed by: FAMILY MEDICINE

## 2023-11-03 PROCEDURE — 63710000001 INSULIN LISPRO (HUMAN) PER 5 UNITS: Performed by: INTERNAL MEDICINE

## 2023-11-03 PROCEDURE — 99232 SBSQ HOSP IP/OBS MODERATE 35: CPT | Performed by: INTERNAL MEDICINE

## 2023-11-03 PROCEDURE — 97110 THERAPEUTIC EXERCISES: CPT

## 2023-11-03 RX ADMIN — HYDROCODONE BITARTRATE AND ACETAMINOPHEN 1 TABLET: 7.5; 325 TABLET ORAL at 05:46

## 2023-11-03 RX ADMIN — APIXABAN 5 MG: 5 TABLET, FILM COATED ORAL at 09:05

## 2023-11-03 RX ADMIN — PREGABALIN 75 MG: 75 CAPSULE ORAL at 16:39

## 2023-11-03 RX ADMIN — INSULIN LISPRO 5 UNITS: 100 INJECTION, SOLUTION INTRAVENOUS; SUBCUTANEOUS at 17:36

## 2023-11-03 RX ADMIN — BACLOFEN 10 MG: 10 TABLET ORAL at 05:46

## 2023-11-03 RX ADMIN — PREGABALIN 75 MG: 75 CAPSULE ORAL at 09:05

## 2023-11-03 RX ADMIN — EMPAGLIFLOZIN 10 MG: 10 TABLET, FILM COATED ORAL at 09:05

## 2023-11-03 RX ADMIN — FAMOTIDINE 40 MG: 20 TABLET, FILM COATED ORAL at 09:05

## 2023-11-03 RX ADMIN — INSULIN LISPRO 4 UNITS: 100 INJECTION, SOLUTION INTRAVENOUS; SUBCUTANEOUS at 09:06

## 2023-11-03 RX ADMIN — INSULIN LISPRO 5 UNITS: 100 INJECTION, SOLUTION INTRAVENOUS; SUBCUTANEOUS at 09:05

## 2023-11-03 RX ADMIN — IBUPROFEN 400 MG: 400 TABLET, FILM COATED ORAL at 23:47

## 2023-11-03 RX ADMIN — INSULIN DETEMIR 70 UNITS: 100 INJECTION, SOLUTION SUBCUTANEOUS at 09:06

## 2023-11-03 RX ADMIN — PREGABALIN 75 MG: 75 CAPSULE ORAL at 20:36

## 2023-11-03 RX ADMIN — INSULIN LISPRO 5 UNITS: 100 INJECTION, SOLUTION INTRAVENOUS; SUBCUTANEOUS at 12:55

## 2023-11-03 RX ADMIN — IBUPROFEN 400 MG: 400 TABLET, FILM COATED ORAL at 17:36

## 2023-11-03 RX ADMIN — INSULIN LISPRO 4 UNITS: 100 INJECTION, SOLUTION INTRAVENOUS; SUBCUTANEOUS at 17:36

## 2023-11-03 RX ADMIN — BACLOFEN 10 MG: 10 TABLET ORAL at 12:56

## 2023-11-03 RX ADMIN — ATORVASTATIN CALCIUM 10 MG: 10 TABLET, FILM COATED ORAL at 20:40

## 2023-11-03 RX ADMIN — BACLOFEN 10 MG: 10 TABLET ORAL at 20:38

## 2023-11-03 RX ADMIN — APIXABAN 5 MG: 5 TABLET, FILM COATED ORAL at 20:36

## 2023-11-03 RX ADMIN — INSULIN LISPRO 4 UNITS: 100 INJECTION, SOLUTION INTRAVENOUS; SUBCUTANEOUS at 20:37

## 2023-11-03 RX ADMIN — HYDROCODONE BITARTRATE AND ACETAMINOPHEN 1 TABLET: 7.5; 325 TABLET ORAL at 20:36

## 2023-11-03 RX ADMIN — LISINOPRIL 2.5 MG: 2.5 TABLET ORAL at 10:01

## 2023-11-03 RX ADMIN — HYDROCODONE BITARTRATE AND ACETAMINOPHEN 1 TABLET: 7.5; 325 TABLET ORAL at 12:56

## 2023-11-03 RX ADMIN — DIPHENOXYLATE HYDROCHLORIDE AND ATROPINE SULFATE 1 TABLET: 2.5; .025 TABLET ORAL at 20:36

## 2023-11-03 RX ADMIN — CYANOCOBALAMIN TAB 500 MCG 1000 MCG: 500 TAB at 09:05

## 2023-11-03 RX ADMIN — IBUPROFEN 400 MG: 400 TABLET, FILM COATED ORAL at 05:54

## 2023-11-03 RX ADMIN — Medication 250 MG: at 20:36

## 2023-11-03 RX ADMIN — INSULIN DETEMIR 65 UNITS: 100 INJECTION, SOLUTION SUBCUTANEOUS at 20:37

## 2023-11-03 RX ADMIN — INSULIN LISPRO 4 UNITS: 100 INJECTION, SOLUTION INTRAVENOUS; SUBCUTANEOUS at 12:56

## 2023-11-03 RX ADMIN — BACITRACIN 0.9 G: 500 OINTMENT TOPICAL at 20:43

## 2023-11-03 RX ADMIN — Medication 250 MG: at 09:05

## 2023-11-03 NOTE — PLAN OF CARE
"Goal Outcome Evaluation:  Plan of Care Reviewed With: patient      Pt vss. Pt required supplemental insulin at each meal during this shift. Pt continues to order extra meals from [employer]. Educated patient on importance of compliance with carb consistent diet orders, pt stated \"that's not my diet. That's not the problem\". Pt rates pain 10/10 and requests PRN baclofen, norco, and advil at each available hour. Call light placed within reach and continuing with plan of care.                "

## 2023-11-03 NOTE — THERAPY TREATMENT NOTE
Acute Care - Physical Therapy Treatment Note  KEO Dominguez     Patient Name: Jose Shaikh  : 1960  MRN: [id number]  Today's Date: 11/3/2023      Visit Dx:     ICD-10-CM ICD-9-CM   1. Generalized weakness  R53.1 780.79   2. Hyponatremia  E87.1 276.1   3. Difficulty in walking  R26.2 719.7   4. Decreased activities of daily living (ADL)  Z78.9 V49.89   5. Difficulty walking  R26.2 719.7     Patient Active Problem List   Diagnosis    Diabetic ulcer of left heel associated with type 2 DM    Acute osteomyelitis of left calcaneus     Diabetic ulcer of left heel associated with type 2 DM    Diabetic ulcer of right midfoot associated with type 2 DM    Paroxysmal atrial fibrillation    Essential hypertension    Hyperlipidemia LDL goal <100    Cellulitis and abscess of foot    High alkaline phosphatase level    Osteomyelitis    Onychomycosis    Onychocryptosis    Foot pain, bilateral    Osteomyelitis of foot, right, acute    Cellulitis of right foot    Type 2 diabetes mellitus, with long-term current use of insulin    Class 3 severe obesity due to excess calories with serious comorbidity and body mass index (BMI) of 45.0 to 49.9 in adult    Anxiety disorder, unspecified    Claustrophobia    Dependence on wheelchair    Depression, unspecified    Long term (current) use of anticoagulants    Long term (current) use of oral hypoglycemic drugs    Wound of foot    Non-prs chronic ulcer oth prt r foot limited to brkdwn skin    Orthostatic hypotension    Other chronic osteomyelitis, right ankle and foot    Personal history of nicotine dependence    Thrombocytopenia, unspecified    Unspecified open wound, right foot, initial encounter    Diabetic foot infection    Subacute osteomyelitis of right foot    Right foot pain    Sepsis    Onychomycosis    Foot pain, left    Impaired mobility and ADLs    Absence of toe of right foot    Corns and callosity    Disability of walking    Fracture    Limb swelling    Polyneuropathy     Pressure ulcer, stage 1    Shortness of breath    Generalized weakness     Past Medical History:   Diagnosis Date    Absence of toe of right foot     Acute osteomyelitis of left calcaneus  8/18/2021    Anxiety and depression     Arthritis     Claustrophobia     Corns and callus     Diabetic ulcer of left heel associated with type 2 DM 8/18/2021    Diabetic ulcer of left heel associated with type 2 DM 7/6/2021    Diabetic ulcer of right midfoot associated with type 2 DM 8/18/2021    Difficulty walking     Essential hypertension 8/31/2021    Hammertoe     Hyperlipidemia LDL goal <100 8/31/2021    Ingrown toenail     Obesity     Paroxysmal atrial fibrillation 8/31/2021    Polyneuropathy     Pressure ulcer, stage 1     Tinea unguium     Type 2 diabetes mellitus with polyneuropathy      Past Surgical History:   Procedure Laterality Date    CYST REMOVAL      center of back; benign    INCISION AND DRAINAGE ABSCESS      back    INCISION AND DRAINAGE LEG Right 12/10/2021    Procedure: INCISION AND DRAINAGE LOWER EXTREMITY;  Surgeon: Ash Leyva DPM;  Location: [facility];  Service: Podiatry;  Laterality: Right;    OTHER SURGICAL HISTORY      Surgical clips left foot    TOE SURGERY Right     Removal of 5th toe    TRANS METATARSAL AMPUTATION Right 12/2/2021    Procedure: AMPUTATION TRANS METATARSAL;  Surgeon: Ash Leyva DPM;  Location: [facility];  Service: Podiatry;  Laterality: Right;    WRIST SURGERY Left     repair of injury     PT Assessment (last 12 hours)       PT Evaluation and Treatment       Row Name 11/03/23 1100          Physical Therapy Time and Intention    Subjective Information complains of;weakness;pain (P)   -ZT     Document Type therapy note (daily note) (P)   -ZT     Mode of Treatment individual therapy;physical therapy (P)   -ZT     Patient Effort good (P)   -ZT       Row Name 11/03/23 1100          General Information    Patient Profile Reviewed yes (P)   -ZT     Patient  Observations alert;cooperative;agree to therapy (P)   -ZT     Existing Precautions/Restrictions fall (P)   -       Row Name 11/03/23 1100          Bed Mobility    Bed Mobility other (see comments) (P)   Pt performed TherEx in supine  -       Row Name 11/03/23 1100          Transfers    Transfers other (see comments) (P)   Pt declined Tfs at this time  -       Row Name 11/03/23 1100          Gait/Stairs (Locomotion)    Gait/Stairs Locomotion other (see comments) (P)   Pt declined AMB at this time  -       Row Name 11/03/23 1100          Safety Issues, Functional Mobility    Impairments Affecting Function (Mobility) balance;endurance/activity tolerance (P)   -       Row Name 11/03/23 1100          Balance    Balance Assessment other (see comments) (P)   Pt remained in supine the entire tx session  -       Row Name 11/03/23 1100          Motor Skills    Therapeutic Exercise hip;knee;ankle (P)   -       Row Name 11/03/23 1100          Hip (Therapeutic Exercise)    Hip Strengthening (Therapeutic Exercise) aBduction;aDduction;20 repititions;other (see comments) (P)   Pt performed 20 reps of glute sets and hip adduction/abduction  -       Row Name 11/03/23 1100          Knee (Therapeutic Exercise)    Knee Strengthening (Therapeutic Exercise) SLR (straight leg raise);20 repititions;other (see comments) (P)   Pt performed 20 reps of SLR and quad sets  -       Row Name 11/03/23 1100          Ankle (Therapeutic Exercise)    Ankle Strengthening (Therapeutic Exercise) dorsiflexion;plantarflexion;20 repititions;other (see comments) (P)   Pt performed 20 reps of ankle pumps  -       Row Name             Wound 12/02/21 Right anterior foot Incision    Wound - Properties Group Placement Date: 12/02/21  -ES Side: Right  -ES Orientation: anterior  -ES Location: foot  -ES Primary Wound Type: Incision  -ES    Retired Wound - Properties Group Placement Date: 12/02/21  -ES Side: Right  -ES Orientation: anterior  -ES  Location: foot  -ES Primary Wound Type: Incision  -ES    Retired Wound - Properties Group Date first assessed: 12/02/21  -ES Side: Right  -ES Location: foot  -ES Primary Wound Type: Incision  -ES      Row Name             Wound 10/17/23 0546 Bilateral perirectal MASD (Moisture associated skin damage)    Wound - Properties Group Placement Date: 10/17/23  -AS Placement Time: 0546  -AS Present on Original Admission: N  -AS Side: Bilateral  -AS Location: perirectal  -AS Primary Wound Type: MASD  -AS    Retired Wound - Properties Group Placement Date: 10/17/23  -AS Placement Time: 0546  -AS Present on Original Admission: N  -AS Side: Bilateral  -AS Location: perirectal  -AS Primary Wound Type: MASD  -AS    Retired Wound - Properties Group Date first assessed: 10/17/23  -AS Time first assessed: 0546  -AS Present on Original Admission: N  -AS Side: Bilateral  -AS Location: perirectal  -AS Primary Wound Type: MASD  -AS      Row Name             Wound 11/01/23 1312 Left anterior second toe Blisters    Wound - Properties Group Placement Date: 11/01/23  -RASHARD Placement Time: 1312  -RASHARD Present on Original Admission: N  -RASHARD Side: Left  -RASHARD Orientation: anterior  -RASHARD Location: second toe  -RASHARD Primary Wound Type: Blisters  -RASHARD    Retired Wound - Properties Group Placement Date: 11/01/23  -RASHARD Placement Time: 1312  -RASHARD Present on Original Admission: N  -RASHARD Side: Left  -RASHARD Orientation: anterior  -RASHARD Location: second toe  -RASHARD Primary Wound Type: Blisters  -RASHARD    Retired Wound - Properties Group Date first assessed: 11/01/23  -RASHARD Time first assessed: 1312  -RASHARD Present on Original Admission: N  -RASHARD Side: Left  -RASHARD Location: second toe  -RASHARD Primary Wound Type: Blisters  -RASHARD      Row Name 11/03/23 1100          Plan of Care Review    Plan of Care Reviewed With patient (P)   -ZT       Row Name 11/03/23 1100          Positioning and Restraints    Pre-Treatment Position in bed (P)   -ZT     Post Treatment Position bed (P)   -ZT     In Bed call  light within reach;encouraged to call for assist;exit alarm on (P)   -ZT       Row Name 11/03/23 1100          Therapy Assessment/Plan (PT)    Rehab Potential (PT) fair, will monitor progress closely (P)   -ZT     Criteria for Skilled Interventions Met (PT) yes;skilled treatment is necessary (P)   -ZT     Therapy Frequency (PT) daily (P)   -ZT     Predicted Duration of Therapy Intervention (PT) 10 days (P)   -ZT     Problem List (PT) problems related to;balance;mobility;strength (P)   -ZT     Activity Limitations Related to Problem List (PT) unable to ambulate safely;unable to transfer safely (P)   -ZT       Row Name 11/03/23 1100          Progress Summary (PT)    Progress Toward Functional Goals (PT) progress toward functional goals is fair (P)   -ZT     Daily Progress Summary (PT) Pt presents with decreased activity tolerance, endurance, and strength. He has difficulty moving bed and standing due to hip pain and weakness. He continues to require skilled PT services to address these deficits to improve his functinoal independence. (P)   -ZT       Row Name 11/03/23 1100          Therapy Plan Review/Discharge Plan (PT)    Therapy Plan Review (PT) evaluation/treatment results reviewed;care plan/treatment goals reviewed;participants included;patient (P)   -ZT               User Key  (r) = Recorded By, (t) = Taken By, (c) = Cosigned By      Initials Name Provider Type    Karon Jordan, RN Registered Nurse    Angelina Pennington RN Registered Nurse    Katlyn Garcia RN Registered Nurse    Oscar Ward, PT Student PT Student                    Physical Therapy Education       Title: PT OT SLP Therapies (Done)       Topic: Physical Therapy (Done)       Point: Mobility training (Done)       Learning Progress Summary             Patient Acceptance, E,TB, VU by ZT at 11/1/2023 1352    Acceptance, E, VU,DU by RF at 10/31/2023 1414    Acceptance, E, VU by AV at 10/31/2023 0993    Comment: DC OT services due  to lack of progress    Acceptance, E,TB, VU by ZT at 10/24/2023 1157    Acceptance, E, VU,NR by RASHARD at 9/24/2023 0604    Acceptance, E, VU,DU by RF at 9/18/2023 1537    Acceptance, E, VU,DU by RF at 9/17/2023 1515    Acceptance, E, VU,DU by RF at 9/16/2023 1804    Acceptance, E, VU by AV at 9/13/2023 1059    Acceptance, E,TB, VU by  at 9/12/2023 1308                         Point: Home exercise program (Done)       Learning Progress Summary             Patient Acceptance, E, VU,DU by RF at 10/31/2023 1414    Acceptance, E, VU by AV at 10/31/2023 0927    Comment: DC OT services due to lack of progress    Acceptance, E, VU,NR by RASHARD at 9/24/2023 0604    Acceptance, E, VU,DU by RF at 9/18/2023 1537    Acceptance, E, VU,DU by RF at 9/17/2023 1515    Acceptance, E, VU,DU by RF at 9/16/2023 1804    Acceptance, E, VU by AV at 9/13/2023 1059    Acceptance, E,TB, VU by  at 9/12/2023 1308                         Point: Body mechanics (Done)       Learning Progress Summary             Patient Acceptance, E,TB, VU by ZT at 11/1/2023 1352    Acceptance, E, VU,DU by RF at 10/31/2023 1414    Acceptance, E, VU by AV at 10/31/2023 0927    Comment: DC OT services due to lack of progress    Acceptance, E,TB, VU by ZT at 10/24/2023 1157    Acceptance, E, VU,NR by RASHARD at 9/24/2023 0604    Acceptance, E, VU,DU by RF at 9/18/2023 1537    Acceptance, E, VU,DU by RF at 9/17/2023 1515    Acceptance, E, VU,DU by RF at 9/16/2023 1804    Acceptance, E, VU by AV at 9/13/2023 1059    Acceptance, E,TB, VU by  at 9/12/2023 1308                         Point: Precautions (Done)       Learning Progress Summary             Patient Acceptance, E,TB, VU by ZT at 11/1/2023 1352    Acceptance, E, VU,DU by RF at 10/31/2023 1414    Acceptance, E, VU by AV at 10/31/2023 0927    Comment: DC OT services due to lack of progress    Acceptance, E,TB, VU by ZT at 10/24/2023 1157    Acceptance, E, VU,NR by RASHARD at 9/24/2023 0604    Acceptance, E, VU,DU by RF at  9/18/2023 1537    Acceptance, E, VU,DU by RF at 9/17/2023 1515    Acceptance, E, VU,DU by  at 9/16/2023 1804    Acceptance, E, VU by  at 9/13/2023 1059    Acceptance, E,TB, VU by  at 9/12/2023 1308                                         User Key       Initials Effective Dates Name Provider Type Discipline     06/16/21 -  Selena Lindquist, RN Registered Nurse Nurse    AV 06/16/21 -  Uvaldo Christine OT Occupational Therapist OT     01/19/22 -  Karl Baker, RN Registered Nurse Nurse     08/16/23 - 10/31/23 Gamal Simmons, PT Student PT Student PT    ZT 09/05/23 -  Oscar Shields, PT Student PT Student PT                  PT Recommendation and Plan  Anticipated Discharge Disposition (PT): (P) inpatient rehabilitation facility  Planned Therapy Interventions (PT): (P) balance training, bed mobility training, gait training, stair training, strengthening, transfer training  Therapy Frequency (PT): (P) daily  Progress Summary (PT)  Progress Toward Functional Goals (PT): (P) progress toward functional goals is fair  Daily Progress Summary (PT): (P) Pt presents with decreased activity tolerance, endurance, and strength. He has difficulty moving bed and standing due to hip pain and weakness. He continues to require skilled PT services to address these deficits to improve his functinoal independence.  Plan of Care Reviewed With: (P) patient  Outcome Evaluation: Pt presents with decreased BLE strength and activity endurance. He can only tolerate standing for short period of time before his hip pain becomes unbearable and his legs fatigue. He continues to require skilled PT services to address these deficits so that he can safely improve his functional independence.   Outcome Measures       Row Name 11/03/23 1156 11/02/23 1300 11/01/23 1300       How much help from another person do you currently need...    Turning from your back to your side while in flat bed without using bedrails? 4 (P)   -ZT 4  -MOE (r) ZT  (t) MOE (c) 4  -MOE (r) ZT (t) MOE (c)    Moving from lying on back to sitting on the side of a flat bed without bedrails? 4 (P)   -ZT 4  -MOE (r) ZT (t) MOE (c) 3  -MOE (r) ZT (t) MOE (c)    Moving to and from a bed to a chair (including a wheelchair)? 2 (P)   -ZT 2  -MOE (r) ZT (t) MOE (c) 3  -MOE (r) ZT (t) MOE (c)    Standing up from a chair using your arms (e.g., wheelchair, bedside chair)? 2 (P)   -ZT 2  -MOE (r) ZT (t) MOE (c) 2  -MOE (r) ZT (t) MOE (c)    Climbing 3-5 steps with a railing? 1 (P)   -ZT 1  -MOE (r) ZT (t) MOE (c) 1  -MOE (r) ZT (t) MOE (c)    To walk in hospital room? 1 (P)   -ZT 1  -MOE (r) ZT (t) MOE (c) 1  -MOE (r) ZT (t) MOE (c)    AM-PAC 6 Clicks Score (PT) 14 (P)   -ZT 14  -MOE (r) ZT (t) 14  -MOE (r) ZT (t)    Highest level of mobility 4 --> Transferred to chair/commode (P)   -ZT 4 --> Transferred to chair/commode  -MOE (r) ZT (t) 4 --> Transferred to chair/commode  -MOE (r) ZT (t)              User Key  (r) = Recorded By, (t) = Taken By, (c) = Cosigned By      Initials Name Provider Type    Merlin De La O, PT Physical Therapist    ZT Oscar Shields, MERLIN Student PT Student                     Time Calculation:    PT Charges       Row Name 11/03/23 1108             Time Calculation    PT Received On 11/03/23 (P)   -ZT         Timed Charges    61690 - PT Therapeutic Exercise Minutes 15 (P)   -ZT         Total Minutes    Timed Charges Total Minutes 15 (P)   -ZT       Total Minutes 15 (P)   -ZT                User Key  (r) = Recorded By, (t) = Taken By, (c) = Cosigned By      Initials Name Provider Type    ZT Oscar Shields, PT Student PT Student                  Therapy Charges for Today       Code Description Service Date Service Provider Modifiers Qty    13525695845 HC PT THER PROC EA 15 MIN 11/2/2023 Oscar Shields, PT Student GP 1    47515296507 HC PT THER PROC EA 15 MIN 11/3/2023 Oscar Shields, PT Student GP 1            PT G-Codes  Outcome Measure Options: AM-PAC 6 Clicks Daily Activity (OT), Optimal  Instrument  AM-PAC 6 Clicks Score (PT): (P) 14  AM-PAC 6 Clicks Score (OT): 16    Oscar Shields, PT Student  11/3/2023

## 2023-11-03 NOTE — PLAN OF CARE
Goal Outcome Evaluation:  Plan of Care Reviewed With: patient        Progress: no change  Outcome Evaluation: patient is alert and oriented x4 and on room air. skin and wound care provided per orders. blood gluocse monitored. medicated with prn pain medication throughout shift. no new issues or concerns at this time.

## 2023-11-03 NOTE — PROGRESS NOTES
[facility]   Hospitalist Progress Note  Date: 11/3/2023  Patient Name: Jose Shaikh  : 1960  MRN: [id number]  Date of admission: 9/10/2023      Subjective   Subjective     Chief Complaint: Follow-up weakness    Summary:Jose Shaikh is a 63 y.o. male  initially hospitalized on 9/10/2023, prolonged hospitalization for treatment of management of generalized weakness, deconditioning, with acute issues of diarrhea, C. difficile negative.  Diarrhea improved.  Had small hematoma after ambulating on his foot.  Unfortunately with exhaustive efforts to try to place this gentleman after 39 days of hospitalization, we are unable to find a facility that will accept him.  He has no further acute needs or requirements for inpatient monitoring and management, his labs and vitals are stable, he is tolerating oral intake, and has been refusing turns and repositionings and other interventions with nursing staff despite recommendations.  Patient discharged in hemodynamically stable addition on 10/19/2023 to follow-up with PCP within 1 week.  Unfortunately we cannot solve all of his social issues during this hospitalization due to lack of resources and participation from the patient's perspective despite all our efforts.  Patient has a financial means of making arrangements at home.  Patient appealed his discharge.  Lost his appeal.  LCD: 10/20/23, PFL beginning: 10/21/23 @ 12pm.  Discharge planning continues to work on inpatient placement.    Interval Followup:   Patient resting comfortably in his bed, per nursing reports he is not happy with his current diet    Objective   Objective     Vitals:   Temp:  [97.5 °F (36.4 °C)-97.8 °F (36.6 °C)] 97.5 °F (36.4 °C)  Heart Rate:  [83-93] 83  Resp:  [18-20] 20  BP: (116-133)/(57-68) 133/68  Physical Exam   GEN: No acute distress  HEENT: Moist mucous membranes  LUNGS: Equal chest rise bilaterally  CARDIAC: Regular rate and rhythm  NEURO: Moving all 4 extremities  spontaneously  SKIN: No obvious breakdown    Result Review    Result Review:  I have personally reviewed these results and agree with these findings:  [x]  Laboratory  LAB RESULTS:                                    Brief Urine Lab Results  (Last result in the past 365 days)        Color   Clarity   Blood   Leuk Est   Nitrite   Protein   CREAT   Urine HCG        09/11/23 2200 Dark Yellow   Clear   Negative   Negative   Negative   Negative                 Microbiology Results (last 10 days)       ** No results found for the last 240 hours. **            []  Microbiology  []  Radiology  No radiology results for the last 7 days    []  EKG/Telemetry   []  Cardiology/Vascular   []  Pathology  []  Old records  [x]  Other:  Scheduled Meds:ammonium lactate, , Topical, Daily  apixaban, 5 mg, Oral, Q12H  atorvastatin, 10 mg, Oral, Nightly  bacitracin, 1 application , Topical, BID  cholestyramine, 1 packet, Oral, Q12H  empagliflozin, 10 mg, Oral, Daily  famotidine, 40 mg, Oral, Daily  insulin detemir, 65 Units, Subcutaneous, Nightly  insulin detemir, 70 Units, Subcutaneous, Daily  insulin lispro, 4-24 Units, Subcutaneous, 4x Daily AC & at Bedtime  Insulin Lispro, 5 Units, Subcutaneous, TID With Meals  lisinopril, 2.5 mg, Oral, Q24H  pregabalin, 75 mg, Oral, TID  saccharomyces boulardii, 250 mg, Oral, BID  sodium chloride, 10 mL, Intravenous, Q12H  vitamin B-12, 1,000 mcg, Oral, Daily      Continuous Infusions:   PRN Meds:.  acetaminophen    baclofen    senna-docusate sodium **AND** polyethylene glycol **AND** bisacodyl **AND** bisacodyl    dextrose    dextrose    diphenoxylate-atropine    glucagon (human recombinant)    HYDROcodone-acetaminophen    hydrocortisone-bacitracin-zinc oxide-nystatin    ibuprofen    ondansetron    ondansetron ODT    simethicone    sodium chloride    sodium chloride      Assessment & Plan   Assessment / Plan     Assessment/Plan:  Assessment:  Generalized weakness  Deconditioning  Type 2 diabetes  mellitus  Essential hypertension  Chronic paroxysmal atrial fibrillation on Eliquis  Obesity (BMI: 42.88)  Debility with wheelchair dependence  Severe degenerative joint disease of lower extremities  Chronic wound  Thrombocytopenia     Plan:  Patient was discharged initially on 10/19/2023, patient appealed discharge, lost appeal, initial discharge order was canceled by accident and reentered on 10/21/2023, patient remains discharged as of 10/19/2023 but refuses to leave.  PFL beginning 10/21/2023 at 12 PM.  Continue to work with  to determine next steps  Encourage patient to mobilize with therapists and staff  Continue blood sugar control with current regimen Levemir twice daily and titrate up by 5 unites every 3 days while fasting blood sugar greater than 130. This can be done as an outpatient.  Patient receiving 70 units of Levemir currently  Continue sliding scale Humalog   Continue carb consistent diet  Continue Florastor  Continue as needed Imodium and scheduled Questran  Continue other medications as ordered  Continue PT/OT  Check labs as needed  Vital signs reviewed and stable  Daily weights  Discussed plan with bedside RN and .    Per discharge planning patient not being seen by PT/OT due to discharge order being present and patient needs to work with PT/OT for inpatient rehab placement.Discharge order removed for that reason only. Patient remains stable for discharge.    DVT prophylaxis:  Medical DVT prophylaxis orders are present.    CODE STATUS:   Code Status (Patient has no pulse and is not breathing): CPR (Attempt to Resuscitate)  Medical Interventions (Patient has pulse or is breathing): Full Support        Electronically signed by Max Mehta MD, 11/03/23, 12:03 PM EDT.

## 2023-11-04 LAB
GLUCOSE BLDC GLUCOMTR-MCNC: 108 MG/DL (ref 70–99)
GLUCOSE BLDC GLUCOMTR-MCNC: 163 MG/DL (ref 70–99)
GLUCOSE BLDC GLUCOMTR-MCNC: 165 MG/DL (ref 70–99)
GLUCOSE BLDC GLUCOMTR-MCNC: 179 MG/DL (ref 70–99)
GLUCOSE BLDC GLUCOMTR-MCNC: 189 MG/DL (ref 70–99)

## 2023-11-04 PROCEDURE — 82948 REAGENT STRIP/BLOOD GLUCOSE: CPT

## 2023-11-04 PROCEDURE — 63710000001 INSULIN LISPRO (HUMAN) PER 5 UNITS: Performed by: INTERNAL MEDICINE

## 2023-11-04 PROCEDURE — 99232 SBSQ HOSP IP/OBS MODERATE 35: CPT | Performed by: INTERNAL MEDICINE

## 2023-11-04 PROCEDURE — 63710000001 INSULIN DETEMIR PER 5 UNITS: Performed by: FAMILY MEDICINE

## 2023-11-04 RX ADMIN — INSULIN DETEMIR 65 UNITS: 100 INJECTION, SOLUTION SUBCUTANEOUS at 22:20

## 2023-11-04 RX ADMIN — HYDROCODONE BITARTRATE AND ACETAMINOPHEN 1 TABLET: 7.5; 325 TABLET ORAL at 15:48

## 2023-11-04 RX ADMIN — INSULIN LISPRO 4 UNITS: 100 INJECTION, SOLUTION INTRAVENOUS; SUBCUTANEOUS at 22:20

## 2023-11-04 RX ADMIN — Medication 250 MG: at 22:20

## 2023-11-04 RX ADMIN — INSULIN DETEMIR 70 UNITS: 100 INJECTION, SOLUTION SUBCUTANEOUS at 08:39

## 2023-11-04 RX ADMIN — BACLOFEN 10 MG: 10 TABLET ORAL at 22:20

## 2023-11-04 RX ADMIN — APIXABAN 5 MG: 5 TABLET, FILM COATED ORAL at 08:39

## 2023-11-04 RX ADMIN — BACITRACIN 0.9 G: 500 OINTMENT TOPICAL at 12:57

## 2023-11-04 RX ADMIN — BACLOFEN 10 MG: 10 TABLET ORAL at 13:00

## 2023-11-04 RX ADMIN — HYDROCODONE BITARTRATE AND ACETAMINOPHEN 1 TABLET: 7.5; 325 TABLET ORAL at 09:53

## 2023-11-04 RX ADMIN — INSULIN LISPRO 5 UNITS: 100 INJECTION, SOLUTION INTRAVENOUS; SUBCUTANEOUS at 08:39

## 2023-11-04 RX ADMIN — INSULIN LISPRO 4 UNITS: 100 INJECTION, SOLUTION INTRAVENOUS; SUBCUTANEOUS at 08:39

## 2023-11-04 RX ADMIN — CYANOCOBALAMIN TAB 500 MCG 1000 MCG: 500 TAB at 08:42

## 2023-11-04 RX ADMIN — BACITRACIN 0.9 G: 500 OINTMENT TOPICAL at 22:24

## 2023-11-04 RX ADMIN — INSULIN LISPRO 5 UNITS: 100 INJECTION, SOLUTION INTRAVENOUS; SUBCUTANEOUS at 12:57

## 2023-11-04 RX ADMIN — LISINOPRIL 2.5 MG: 2.5 TABLET ORAL at 08:41

## 2023-11-04 RX ADMIN — PREGABALIN 75 MG: 75 CAPSULE ORAL at 15:48

## 2023-11-04 RX ADMIN — Medication: at 12:57

## 2023-11-04 RX ADMIN — ATORVASTATIN CALCIUM 10 MG: 10 TABLET, FILM COATED ORAL at 22:22

## 2023-11-04 RX ADMIN — Medication 250 MG: at 08:41

## 2023-11-04 RX ADMIN — IBUPROFEN 400 MG: 400 TABLET, FILM COATED ORAL at 09:52

## 2023-11-04 RX ADMIN — EMPAGLIFLOZIN 10 MG: 10 TABLET, FILM COATED ORAL at 08:41

## 2023-11-04 RX ADMIN — HYDROCODONE BITARTRATE AND ACETAMINOPHEN 1 TABLET: 7.5; 325 TABLET ORAL at 22:20

## 2023-11-04 RX ADMIN — PREGABALIN 75 MG: 75 CAPSULE ORAL at 08:39

## 2023-11-04 RX ADMIN — INSULIN LISPRO 5 UNITS: 100 INJECTION, SOLUTION INTRAVENOUS; SUBCUTANEOUS at 17:28

## 2023-11-04 RX ADMIN — PREGABALIN 75 MG: 75 CAPSULE ORAL at 22:20

## 2023-11-04 RX ADMIN — BACLOFEN 10 MG: 10 TABLET ORAL at 04:32

## 2023-11-04 RX ADMIN — INSULIN LISPRO 4 UNITS: 100 INJECTION, SOLUTION INTRAVENOUS; SUBCUTANEOUS at 12:57

## 2023-11-04 RX ADMIN — SIMETHICONE 80 MG: 80 TABLET, CHEWABLE ORAL at 15:48

## 2023-11-04 RX ADMIN — FAMOTIDINE 40 MG: 20 TABLET, FILM COATED ORAL at 08:39

## 2023-11-04 RX ADMIN — IBUPROFEN 400 MG: 400 TABLET, FILM COATED ORAL at 22:20

## 2023-11-04 RX ADMIN — APIXABAN 5 MG: 5 TABLET, FILM COATED ORAL at 22:20

## 2023-11-04 RX ADMIN — HYDROCODONE BITARTRATE AND ACETAMINOPHEN 1 TABLET: 7.5; 325 TABLET ORAL at 03:03

## 2023-11-04 RX ADMIN — IBUPROFEN 400 MG: 400 TABLET, FILM COATED ORAL at 15:48

## 2023-11-04 NOTE — NURSING NOTE
Patient ordered door dash this morning around 645 from [employer] he ordered 2 biscuits and gravy,scrambled eggs, burrito, and a hash brown, patient educated on that he was on diet and educated on his diabetes.

## 2023-11-04 NOTE — PROGRESS NOTES
[facility]   Hospitalist Progress Note  Date: 2023  Patient Name: Jose Shaikh  : 1960  MRN: [id number]  Date of admission: 9/10/2023      Subjective   Subjective     Chief Complaint: Follow-up weakness    Summary:Jose Shaikh is a 63 y.o. male  initially hospitalized on 9/10/2023, prolonged hospitalization for treatment of management of generalized weakness, deconditioning, with acute issues of diarrhea, C. difficile negative.  Diarrhea improved.  Had small hematoma after ambulating on his foot.  Unfortunately with exhaustive efforts to try to place this gentleman after 39 days of hospitalization, we are unable to find a facility that will accept him.  He has no further acute needs or requirements for inpatient monitoring and management, his labs and vitals are stable, he is tolerating oral intake, and has been refusing turns and repositionings and other interventions with nursing staff despite recommendations.  Patient discharged in hemodynamically stable addition on 10/19/2023 to follow-up with PCP within 1 week.  Unfortunately we cannot solve all of his social issues during this hospitalization due to lack of resources and participation from the patient's perspective despite all our efforts.  Patient has a financial means of making arrangements at home.  Patient appealed his discharge.  Lost his appeal.  LCD: 10/20/23, PFL beginning: 10/21/23 @ 12pm.  Discharge planning continues to work on inpatient placement.    Interval Followup:   No acute events overnight, patient resting comfortably in bed    Objective   Objective     Vitals:   Temp:  [97.3 °F (36.3 °C)-97.9 °F (36.6 °C)] 97.9 °F (36.6 °C)  Heart Rate:  [77-91] 89  Resp:  [16-18] 16  BP: (107-146)/(54-70) 142/70  Physical Exam   GEN: No acute distress  HEENT: Moist mucous membranes  LUNGS: Equal chest rise bilaterally  CARDIAC: Regular rate and rhythm  NEURO: Moving all 4 extremities spontaneously  SKIN: No obvious breakdown    Result  Review    Result Review:  I have personally reviewed these results and agree with these findings:  [x]  Laboratory  LAB RESULTS:                                    Brief Urine Lab Results  (Last result in the past 365 days)        Color   Clarity   Blood   Leuk Est   Nitrite   Protein   CREAT   Urine HCG        09/11/23 2200 Dark Yellow   Clear   Negative   Negative   Negative   Negative                 Microbiology Results (last 10 days)       ** No results found for the last 240 hours. **            []  Microbiology  []  Radiology  No radiology results for the last 7 days    []  EKG/Telemetry   []  Cardiology/Vascular   []  Pathology  []  Old records  [x]  Other:  Scheduled Meds:ammonium lactate, , Topical, Daily  apixaban, 5 mg, Oral, Q12H  atorvastatin, 10 mg, Oral, Nightly  bacitracin, 1 application , Topical, BID  cholestyramine, 1 packet, Oral, Q12H  empagliflozin, 10 mg, Oral, Daily  famotidine, 40 mg, Oral, Daily  insulin detemir, 65 Units, Subcutaneous, Nightly  insulin detemir, 70 Units, Subcutaneous, Daily  insulin lispro, 4-24 Units, Subcutaneous, 4x Daily AC & at Bedtime  Insulin Lispro, 5 Units, Subcutaneous, TID With Meals  lisinopril, 2.5 mg, Oral, Q24H  pregabalin, 75 mg, Oral, TID  saccharomyces boulardii, 250 mg, Oral, BID  sodium chloride, 10 mL, Intravenous, Q12H  vitamin B-12, 1,000 mcg, Oral, Daily      Continuous Infusions:   PRN Meds:.  acetaminophen    baclofen    senna-docusate sodium **AND** polyethylene glycol **AND** bisacodyl **AND** bisacodyl    dextrose    dextrose    diphenoxylate-atropine    glucagon (human recombinant)    HYDROcodone-acetaminophen    hydrocortisone-bacitracin-zinc oxide-nystatin    ibuprofen    ondansetron    ondansetron ODT    simethicone    sodium chloride    sodium chloride      Assessment & Plan   Assessment / Plan     Assessment/Plan:  Assessment:  Generalized weakness  Deconditioning  Type 2 diabetes mellitus  Essential hypertension  Chronic paroxysmal  atrial fibrillation on Eliquis  Obesity (BMI: 42.88)  Debility with wheelchair dependence  Severe degenerative joint disease of lower extremities  Chronic wound  Thrombocytopenia     Plan:  Patient was discharged initially on 10/19/2023, patient appealed discharge, lost appeal, initial discharge order was canceled by accident and reentered on 10/21/2023, patient remains discharged as of 10/19/2023 but refuses to leave.  PFL beginning 10/21/2023 at 12 PM.  Continue to work with  to determine next steps  Encourage patient to mobilize with therapists and staff  Continue blood sugar control with current regimen Levemir twice daily and titrate up by 5 unites every 3 days while fasting blood sugar greater than 130. This can be done as an outpatient.  Patient receiving 70 units of Levemir currently  Continue sliding scale Humalog   Continue carb consistent diet, patient is not happy about this  Continue Florastor  Continue as needed Imodium and scheduled Questran  Continue other medications as ordered  Continue PT/OT  Check CBC, CMP, mag and Phos in a.m.  Vital signs reviewed and stable  Daily weights  Discussed plan with bedside RN and .    Per discharge planning patient not being seen by PT/OT due to discharge order being present and patient needs to work with PT/OT for inpatient rehab placement.Discharge order removed for that reason only. Patient remains stable for discharge.    DVT prophylaxis:  Medical DVT prophylaxis orders are present.    CODE STATUS:   Code Status (Patient has no pulse and is not breathing): CPR (Attempt to Resuscitate)  Medical Interventions (Patient has pulse or is breathing): Full Support        Electronically signed by Max Mehta MD, 11/04/23, 11:43 AM EDT.

## 2023-11-04 NOTE — PLAN OF CARE
Goal Outcome Evaluation:  Plan of Care Reviewed With: patient        Progress: no change  Outcome Evaluation: patient is alert and oriented, medicated with prn pain medication. wound care completed per orders. no other changes at this time.

## 2023-11-05 LAB
ALBUMIN SERPL-MCNC: 3.1 G/DL (ref 3.5–5.2)
ALBUMIN/GLOB SERPL: 1.1 G/DL
ALP SERPL-CCNC: 226 U/L (ref 39–117)
ALT SERPL W P-5'-P-CCNC: 41 U/L (ref 1–41)
ANION GAP SERPL CALCULATED.3IONS-SCNC: 7 MMOL/L (ref 5–15)
AST SERPL-CCNC: 39 U/L (ref 1–40)
BASOPHILS # BLD AUTO: 0.05 10*3/MM3 (ref 0–0.2)
BASOPHILS NFR BLD AUTO: 1.2 % (ref 0–1.5)
BILIRUB SERPL-MCNC: 0.6 MG/DL (ref 0–1.2)
BUN SERPL-MCNC: 15 MG/DL (ref 8–23)
BUN/CREAT SERPL: 27.8 (ref 7–25)
CALCIUM SPEC-SCNC: 8.6 MG/DL (ref 8.6–10.5)
CHLORIDE SERPL-SCNC: 105 MMOL/L (ref 98–107)
CO2 SERPL-SCNC: 25 MMOL/L (ref 22–29)
CREAT SERPL-MCNC: 0.54 MG/DL (ref 0.76–1.27)
DEPRECATED RDW RBC AUTO: 45.4 FL (ref 37–54)
EGFRCR SERPLBLD CKD-EPI 2021: 110.6 ML/MIN/1.73
EOSINOPHIL # BLD AUTO: 0.09 10*3/MM3 (ref 0–0.4)
EOSINOPHIL NFR BLD AUTO: 2.1 % (ref 0.3–6.2)
ERYTHROCYTE [DISTWIDTH] IN BLOOD BY AUTOMATED COUNT: 15.9 % (ref 12.3–15.4)
GLOBULIN UR ELPH-MCNC: 2.9 GM/DL
GLUCOSE BLDC GLUCOMTR-MCNC: 116 MG/DL (ref 70–99)
GLUCOSE BLDC GLUCOMTR-MCNC: 146 MG/DL (ref 70–99)
GLUCOSE BLDC GLUCOMTR-MCNC: 166 MG/DL (ref 70–99)
GLUCOSE BLDC GLUCOMTR-MCNC: 173 MG/DL (ref 70–99)
GLUCOSE BLDC GLUCOMTR-MCNC: 176 MG/DL (ref 70–99)
GLUCOSE SERPL-MCNC: 147 MG/DL (ref 65–99)
HCT VFR BLD AUTO: 30 % (ref 37.5–51)
HGB BLD-MCNC: 9 G/DL (ref 13–17.7)
HYPOCHROMIA BLD QL: NORMAL
IMM GRANULOCYTES # BLD AUTO: 0.01 10*3/MM3 (ref 0–0.05)
IMM GRANULOCYTES NFR BLD AUTO: 0.2 % (ref 0–0.5)
LARGE PLATELETS: NORMAL
LYMPHOCYTES # BLD AUTO: 1.01 10*3/MM3 (ref 0.7–3.1)
LYMPHOCYTES NFR BLD AUTO: 24 % (ref 19.6–45.3)
MAGNESIUM SERPL-MCNC: 2 MG/DL (ref 1.6–2.4)
MCH RBC QN AUTO: 23.8 PG (ref 26.6–33)
MCHC RBC AUTO-ENTMCNC: 30 G/DL (ref 31.5–35.7)
MCV RBC AUTO: 79.4 FL (ref 79–97)
MONOCYTES # BLD AUTO: 0.59 10*3/MM3 (ref 0.1–0.9)
MONOCYTES NFR BLD AUTO: 14 % (ref 5–12)
NEUTROPHILS NFR BLD AUTO: 2.45 10*3/MM3 (ref 1.7–7)
NEUTROPHILS NFR BLD AUTO: 58.5 % (ref 42.7–76)
NRBC BLD AUTO-RTO: 0 /100 WBC (ref 0–0.2)
PHOSPHATE SERPL-MCNC: 4 MG/DL (ref 2.5–4.5)
PLATELET # BLD AUTO: 99 10*3/MM3 (ref 140–450)
PMV BLD AUTO: 11.1 FL (ref 6–12)
POIKILOCYTOSIS BLD QL SMEAR: NORMAL
POTASSIUM SERPL-SCNC: 4.1 MMOL/L (ref 3.5–5.2)
PROT SERPL-MCNC: 6 G/DL (ref 6–8.5)
RBC # BLD AUTO: 3.78 10*6/MM3 (ref 4.14–5.8)
SODIUM SERPL-SCNC: 137 MMOL/L (ref 136–145)
WBC MORPH BLD: NORMAL
WBC NRBC COR # BLD: 4.2 10*3/MM3 (ref 3.4–10.8)

## 2023-11-05 PROCEDURE — 63710000001 INSULIN LISPRO (HUMAN) PER 5 UNITS: Performed by: INTERNAL MEDICINE

## 2023-11-05 PROCEDURE — 80053 COMPREHEN METABOLIC PANEL: CPT | Performed by: INTERNAL MEDICINE

## 2023-11-05 PROCEDURE — 97110 THERAPEUTIC EXERCISES: CPT

## 2023-11-05 PROCEDURE — 85025 COMPLETE CBC W/AUTO DIFF WBC: CPT | Performed by: INTERNAL MEDICINE

## 2023-11-05 PROCEDURE — 85007 BL SMEAR W/DIFF WBC COUNT: CPT | Performed by: INTERNAL MEDICINE

## 2023-11-05 PROCEDURE — 82948 REAGENT STRIP/BLOOD GLUCOSE: CPT

## 2023-11-05 PROCEDURE — 84100 ASSAY OF PHOSPHORUS: CPT | Performed by: INTERNAL MEDICINE

## 2023-11-05 PROCEDURE — 99232 SBSQ HOSP IP/OBS MODERATE 35: CPT | Performed by: INTERNAL MEDICINE

## 2023-11-05 PROCEDURE — 63710000001 INSULIN DETEMIR PER 5 UNITS: Performed by: FAMILY MEDICINE

## 2023-11-05 PROCEDURE — 83735 ASSAY OF MAGNESIUM: CPT | Performed by: INTERNAL MEDICINE

## 2023-11-05 RX ADMIN — INSULIN DETEMIR 70 UNITS: 100 INJECTION, SOLUTION SUBCUTANEOUS at 08:43

## 2023-11-05 RX ADMIN — IBUPROFEN 400 MG: 400 TABLET, FILM COATED ORAL at 21:27

## 2023-11-05 RX ADMIN — PREGABALIN 75 MG: 75 CAPSULE ORAL at 21:27

## 2023-11-05 RX ADMIN — LISINOPRIL 2.5 MG: 2.5 TABLET ORAL at 08:43

## 2023-11-05 RX ADMIN — INSULIN DETEMIR 65 UNITS: 100 INJECTION, SOLUTION SUBCUTANEOUS at 21:26

## 2023-11-05 RX ADMIN — IBUPROFEN 400 MG: 400 TABLET, FILM COATED ORAL at 08:43

## 2023-11-05 RX ADMIN — APIXABAN 5 MG: 5 TABLET, FILM COATED ORAL at 08:43

## 2023-11-05 RX ADMIN — SIMETHICONE 80 MG: 80 TABLET, CHEWABLE ORAL at 21:27

## 2023-11-05 RX ADMIN — HYDROCORTISONE 1 APPLICATION: 1 OINTMENT TOPICAL at 21:28

## 2023-11-05 RX ADMIN — INSULIN LISPRO 5 UNITS: 100 INJECTION, SOLUTION INTRAVENOUS; SUBCUTANEOUS at 17:31

## 2023-11-05 RX ADMIN — APIXABAN 5 MG: 5 TABLET, FILM COATED ORAL at 21:27

## 2023-11-05 RX ADMIN — HYDROCODONE BITARTRATE AND ACETAMINOPHEN 1 TABLET: 7.5; 325 TABLET ORAL at 15:02

## 2023-11-05 RX ADMIN — EMPAGLIFLOZIN 10 MG: 10 TABLET, FILM COATED ORAL at 08:43

## 2023-11-05 RX ADMIN — FAMOTIDINE 40 MG: 20 TABLET, FILM COATED ORAL at 08:43

## 2023-11-05 RX ADMIN — INSULIN LISPRO 5 UNITS: 100 INJECTION, SOLUTION INTRAVENOUS; SUBCUTANEOUS at 12:41

## 2023-11-05 RX ADMIN — PREGABALIN 75 MG: 75 CAPSULE ORAL at 08:43

## 2023-11-05 RX ADMIN — INSULIN LISPRO 4 UNITS: 100 INJECTION, SOLUTION INTRAVENOUS; SUBCUTANEOUS at 17:31

## 2023-11-05 RX ADMIN — Medication 250 MG: at 08:43

## 2023-11-05 RX ADMIN — BACLOFEN 10 MG: 10 TABLET ORAL at 17:31

## 2023-11-05 RX ADMIN — BACITRACIN 0.9 G: 500 OINTMENT TOPICAL at 21:28

## 2023-11-05 RX ADMIN — ATORVASTATIN CALCIUM 10 MG: 10 TABLET, FILM COATED ORAL at 21:27

## 2023-11-05 RX ADMIN — BACLOFEN 10 MG: 10 TABLET ORAL at 08:43

## 2023-11-05 RX ADMIN — ACETAMINOPHEN 650 MG: 325 TABLET ORAL at 02:20

## 2023-11-05 RX ADMIN — Medication 250 MG: at 21:27

## 2023-11-05 RX ADMIN — BACITRACIN 0.9 G: 500 OINTMENT TOPICAL at 10:03

## 2023-11-05 RX ADMIN — INSULIN LISPRO 4 UNITS: 100 INJECTION, SOLUTION INTRAVENOUS; SUBCUTANEOUS at 21:26

## 2023-11-05 RX ADMIN — IBUPROFEN 400 MG: 400 TABLET, FILM COATED ORAL at 15:02

## 2023-11-05 RX ADMIN — Medication: at 10:03

## 2023-11-05 RX ADMIN — PREGABALIN 75 MG: 75 CAPSULE ORAL at 15:02

## 2023-11-05 RX ADMIN — INSULIN LISPRO 5 UNITS: 100 INJECTION, SOLUTION INTRAVENOUS; SUBCUTANEOUS at 08:43

## 2023-11-05 RX ADMIN — HYDROCODONE BITARTRATE AND ACETAMINOPHEN 1 TABLET: 7.5; 325 TABLET ORAL at 21:27

## 2023-11-05 RX ADMIN — CYANOCOBALAMIN TAB 500 MCG 1000 MCG: 500 TAB at 08:43

## 2023-11-05 NOTE — PROGRESS NOTES
[facility]   Hospitalist Progress Note  Date: 2023  Patient Name: Jose Shaikh  : 1960  MRN: [id number]  Date of admission: 9/10/2023      Subjective   Subjective     Chief Complaint: Follow-up weakness    Summary:Jose Shaikh is a 63 y.o. male  initially hospitalized on 9/10/2023, prolonged hospitalization for treatment of management of generalized weakness, deconditioning, with acute issues of diarrhea, C. difficile negative.  Diarrhea improved.  Had small hematoma after ambulating on his foot.  Unfortunately with exhaustive efforts to try to place this gentleman after 39 days of hospitalization, we are unable to find a facility that will accept him.  He has no further acute needs or requirements for inpatient monitoring and management, his labs and vitals are stable, he is tolerating oral intake, and has been refusing turns and repositionings and other interventions with nursing staff despite recommendations.  Patient discharged in hemodynamically stable addition on 10/19/2023 to follow-up with PCP within 1 week.  Unfortunately we cannot solve all of his social issues during this hospitalization due to lack of resources and participation from the patient's perspective despite all our efforts.  Patient has a financial means of making arrangements at home.  Patient appealed his discharge.  Lost his appeal.  LCD: 10/20/23, PFL beginning: 10/21/23 @ 12pm.  Discharge planning continues to work on inpatient placement.    Interval Followup:   No acute events overnight, patient resting comfortably in bed, lower extremity foot wound with some drainage this a.m.    Objective   Objective     Vitals:   Temp:  [97.3 °F (36.3 °C)-98.2 °F (36.8 °C)] 97.3 °F (36.3 °C)  Heart Rate:  [81-92] 84  Resp:  [16-18] 16  BP: (117-139)/(55-70) 139/66  Physical Exam   GEN: No acute distress  HEENT: Moist mucous membranes  LUNGS: Equal chest rise bilaterally  CARDIAC: Regular rate and rhythm  NEURO: Moving all 4  extremities spontaneously  SKIN: No obvious breakdown    Result Review    Result Review:  I have personally reviewed these results and agree with these findings:  [x]  Laboratory  LAB RESULTS:      Lab 11/05/23  0541   WBC 4.20   HEMOGLOBIN 9.0*   HEMATOCRIT 30.0*   PLATELETS 99*   NEUTROS ABS 2.45   IMMATURE GRANS (ABS) 0.01   LYMPHS ABS 1.01   MONOS ABS 0.59   EOS ABS 0.09   MCV 79.4           Lab 11/05/23  0541   SODIUM 137   POTASSIUM 4.1   CHLORIDE 105   CO2 25.0   ANION GAP 7.0   BUN 15   CREATININE 0.54*   EGFR 110.6   GLUCOSE 147*   CALCIUM 8.6   MAGNESIUM 2.0   PHOSPHORUS 4.0           Lab 11/05/23  0541   TOTAL PROTEIN 6.0   ALBUMIN 3.1*   GLOBULIN 2.9   ALT (SGPT) 41   AST (SGOT) 39   BILIRUBIN 0.6   ALK PHOS 226*                       Brief Urine Lab Results  (Last result in the past 365 days)        Color   Clarity   Blood   Leuk Est   Nitrite   Protein   CREAT   Urine HCG        09/11/23 2200 Dark Yellow   Clear   Negative   Negative   Negative   Negative                 Microbiology Results (last 10 days)       ** No results found for the last 240 hours. **            []  Microbiology  []  Radiology  No radiology results for the last 7 days    []  EKG/Telemetry   []  Cardiology/Vascular   []  Pathology  []  Old records  [x]  Other:  Scheduled Meds:ammonium lactate, , Topical, Daily  apixaban, 5 mg, Oral, Q12H  atorvastatin, 10 mg, Oral, Nightly  bacitracin, 1 application , Topical, BID  cholestyramine, 1 packet, Oral, Q12H  empagliflozin, 10 mg, Oral, Daily  famotidine, 40 mg, Oral, Daily  insulin detemir, 65 Units, Subcutaneous, Nightly  insulin detemir, 70 Units, Subcutaneous, Daily  insulin lispro, 4-24 Units, Subcutaneous, 4x Daily AC & at Bedtime  Insulin Lispro, 5 Units, Subcutaneous, TID With Meals  lisinopril, 2.5 mg, Oral, Q24H  pregabalin, 75 mg, Oral, TID  saccharomyces boulardii, 250 mg, Oral, BID  sodium chloride, 10 mL, Intravenous, Q12H  vitamin B-12, 1,000 mcg, Oral,  Daily      Continuous Infusions:   PRN Meds:.  acetaminophen    baclofen    senna-docusate sodium **AND** polyethylene glycol **AND** bisacodyl **AND** bisacodyl    dextrose    dextrose    diphenoxylate-atropine    glucagon (human recombinant)    HYDROcodone-acetaminophen    hydrocortisone-bacitracin-zinc oxide-nystatin    ibuprofen    ondansetron    ondansetron ODT    simethicone    sodium chloride    sodium chloride      Assessment & Plan   Assessment / Plan     Assessment/Plan:  Assessment:  Generalized weakness  Deconditioning  Type 2 diabetes mellitus  Essential hypertension  Chronic paroxysmal atrial fibrillation on Eliquis  Obesity (BMI: 42.88)  Debility with wheelchair dependence  Severe degenerative joint disease of lower extremities  Chronic wound  Thrombocytopenia     Plan:  Patient was discharged initially on 10/19/2023, patient appealed discharge, lost appeal, initial discharge order was canceled by accident and reentered on 10/21/2023, patient remains discharged as of 10/19/2023 but refuses to leave.  PFL beginning 10/21/2023 at 12 PM.  Continue to work with  to determine next steps  Encourage patient to mobilize with therapists and staff  Continue blood sugar control with current regimen Levemir twice daily and titrate up by 5 unites every 3 days while fasting blood sugar greater than 130. This can be done as an outpatient.  Patient receiving 70 units of Levemir currently  Continue sliding scale Humalog   Continue carb consistent diet, patient is not happy about this  Continue Florastor  Continue as needed Imodium and scheduled Questran  Continue other medications as ordered  Continue PT/OT  Labs reviewed and stable  Vital signs reviewed and stable  Daily weights  Wound care on right lower extremity  Discussed plan with bedside RN and .    Per discharge planning patient not being seen by PT/OT due to discharge order being present and patient needs to work with PT/OT for  inpatient rehab placement.Discharge order removed for that reason only. Patient remains stable for discharge.    DVT prophylaxis:  Medical DVT prophylaxis orders are present.    CODE STATUS:   Code Status (Patient has no pulse and is not breathing): CPR (Attempt to Resuscitate)  Medical Interventions (Patient has pulse or is breathing): Full Support        Electronically signed by Max Mehta MD, 11/05/23, 11:42 AM EST.

## 2023-11-05 NOTE — PLAN OF CARE
Goal Outcome Evaluation:  Plan of Care Reviewed With: patient        Progress: no change  Outcome Evaluation: PRN pain meds given for reported hip and knee discomfort. No other issues observed or reported

## 2023-11-05 NOTE — THERAPY TREATMENT NOTE
Acute Care - Physical Therapy Treatment Note  KE[facility] Dominguez     Patient Name: Jose Shaikh  : 1960  MRN: [id number]  Today's Date: 2023      Visit Dx:     ICD-10-CM ICD-9-CM   1. Generalized weakness  R53.1 780.79   2. Hyponatremia  E87.1 276.1   3. Difficulty in walking  R26.2 719.7   4. Decreased activities of daily living (ADL)  Z78.9 V49.89   5. Difficulty walking  R26.2 719.7     Patient Active Problem List   Diagnosis    Diabetic ulcer of left heel associated with type 2 DM    Acute osteomyelitis of left calcaneus     Diabetic ulcer of left heel associated with type 2 DM    Diabetic ulcer of right midfoot associated with type 2 DM    Paroxysmal atrial fibrillation    Essential hypertension    Hyperlipidemia LDL goal <100    Cellulitis and abscess of foot    High alkaline phosphatase level    Osteomyelitis    Onychomycosis    Onychocryptosis    Foot pain, bilateral    Osteomyelitis of foot, right, acute    Cellulitis of right foot    Type 2 diabetes mellitus, with long-term current use of insulin    Class 3 severe obesity due to excess calories with serious comorbidity and body mass index (BMI) of 45.0 to 49.9 in adult    Anxiety disorder, unspecified    Claustrophobia    Dependence on wheelchair    Depression, unspecified    Long term (current) use of anticoagulants    Long term (current) use of oral hypoglycemic drugs    Wound of foot    Non-prs chronic ulcer oth prt r foot limited to brkdwn skin    Orthostatic hypotension    Other chronic osteomyelitis, right ankle and foot    Personal history of nicotine dependence    Thrombocytopenia, unspecified    Unspecified open wound, right foot, initial encounter    Diabetic foot infection    Subacute osteomyelitis of right foot    Right foot pain    Sepsis    Onychomycosis    Foot pain, left    Impaired mobility and ADLs    Absence of toe of right foot    Corns and callosity    Disability of walking    Fracture    Limb swelling    Polyneuropathy     Pressure ulcer, stage 1    Shortness of breath    Generalized weakness     Past Medical History:   Diagnosis Date    Absence of toe of right foot     Acute osteomyelitis of left calcaneus  8/18/2021    Anxiety and depression     Arthritis     Claustrophobia     Corns and callus     Diabetic ulcer of left heel associated with type 2 DM 8/18/2021    Diabetic ulcer of left heel associated with type 2 DM 7/6/2021    Diabetic ulcer of right midfoot associated with type 2 DM 8/18/2021    Difficulty walking     Essential hypertension 8/31/2021    Hammertoe     Hyperlipidemia LDL goal <100 8/31/2021    Ingrown toenail     Obesity     Paroxysmal atrial fibrillation 8/31/2021    Polyneuropathy     Pressure ulcer, stage 1     Tinea unguium     Type 2 diabetes mellitus with polyneuropathy      Past Surgical History:   Procedure Laterality Date    CYST REMOVAL      center of back; benign    INCISION AND DRAINAGE ABSCESS      back    INCISION AND DRAINAGE LEG Right 12/10/2021    Procedure: INCISION AND DRAINAGE LOWER EXTREMITY;  Surgeon: Ash Leyva DPM;  Location: [facility];  Service: Podiatry;  Laterality: Right;    OTHER SURGICAL HISTORY      Surgical clips left foot    TOE SURGERY Right     Removal of 5th toe    TRANS METATARSAL AMPUTATION Right 12/2/2021    Procedure: AMPUTATION TRANS METATARSAL;  Surgeon: Ash Leyva DPM;  Location: [facility] OR;  Service: Podiatry;  Laterality: Right;    WRIST SURGERY Left     repair of injury     PT Assessment (last 12 hours)       PT Evaluation and Treatment       Row Name 11/05/23 0923          Physical Therapy Time and Intention    Subjective Information complains of;weakness;fatigue;pain  -DK     Document Type therapy note (daily note)  -DK     Mode of Treatment individual therapy;physical therapy  -DK     Patient Effort fair  -DK     Symptoms Noted During/After Treatment increased pain  -DK     Comment Pt declined all transfers, reports having a BM in  the bed.  -       Row Name 11/05/23 0946          Pain    Pretreatment Pain Rating 2/10  -DK     Posttreatment Pain Rating 7/10  -DK     Pain Location - Side/Orientation Left  -DK     Pain Location generalized  -DK     Pain Location - hip;knee  -DK     Pain Intervention(s) Distraction;Therapeutic presence  -       Row Name 11/05/23 0946          Cognition    Affect/Mental Status (Cognition) WFL  -DK     Orientation Status (Cognition) oriented x 4  -DK     Follows Commands (Cognition) WFL  -DK     Cognitive Function WFL  -DK     Personal Safety Interventions gait belt;nonskid shoes/slippers when out of bed;supervised activity  -       Row Name 11/05/23 0946          Motor Skills    Motor Skills --  therapeutic exercises  -DK     Coordination WFL  -     Therapeutic Exercise hip;knee;ankle  -     Additional Documentation --  Pt declined transfers due to BM in the bed/pain.  Audible crepitus noted with movement of the left knee.  -       Row Name 11/05/23 0946          Hip (Therapeutic Exercise)    Hip (Therapeutic Exercise) AAROM (active assistive range of motion)  -     Hip AAROM (Therapeutic Exercise) bilateral;flexion;extension;aBduction;aDduction;supine;10 repetitions;2 sets  -       Row Name 11/05/23 0946          Knee (Therapeutic Exercise)    Knee (Therapeutic Exercise) AAROM (active assistive range of motion)  -     Knee AAROM (Therapeutic Exercise) bilateral;flexion;extension;supine;10 repetitions;2 sets  -       Row Name 11/05/23 0946          Ankle (Therapeutic Exercise)    Ankle (Therapeutic Exercise) AROM (active range of motion)  -     Ankle AROM (Therapeutic Exercise) bilateral;dorsiflexion;plantarflexion;supine;20 repititions  -       Row Name             Wound 12/02/21 Right anterior foot Incision    Wound - Properties Group Placement Date: 12/02/21  -ES Side: Right  -ES Orientation: anterior  -ES Location: foot  -ES Primary Wound Type: Incision  -ES    Retired Wound -  Properties Group Placement Date: 12/02/21  -ES Side: Right  -ES Orientation: anterior  -ES Location: foot  -ES Primary Wound Type: Incision  -ES    Retired Wound - Properties Group Date first assessed: 12/02/21  -ES Side: Right  -ES Location: foot  -ES Primary Wound Type: Incision  -ES      Row Name             Wound 10/17/23 0546 Bilateral perirectal MASD (Moisture associated skin damage)    Wound - Properties Group Placement Date: 10/17/23  -AS Placement Time: 0546  -AS Present on Original Admission: N  -AS Side: Bilateral  -AS Location: perirectal  -AS Primary Wound Type: MASD  -AS    Retired Wound - Properties Group Placement Date: 10/17/23  -AS Placement Time: 0546  -AS Present on Original Admission: N  -AS Side: Bilateral  -AS Location: perirectal  -AS Primary Wound Type: MASD  -AS    Retired Wound - Properties Group Date first assessed: 10/17/23  -AS Time first assessed: 0546  -AS Present on Original Admission: N  -AS Side: Bilateral  -AS Location: perirectal  -AS Primary Wound Type: MASD  -AS      Row Name             Wound 11/01/23 1312 Left anterior second toe Blisters    Wound - Properties Group Placement Date: 11/01/23  -RASHARD Placement Time: 1312  -RASHARD Present on Original Admission: N  -RASHARD Side: Left  -RASHARD Orientation: anterior  -RASHARD Location: second toe  -RASHARD Primary Wound Type: Blisters  -RASHARD    Retired Wound - Properties Group Placement Date: 11/01/23  -RASHARD Placement Time: 1312  -RASHARD Present on Original Admission: N  -RASHARD Side: Left  -RASHARD Orientation: anterior  -RASHARD Location: second toe  -RASHARD Primary Wound Type: Blisters  -RASHARD    Retired Wound - Properties Group Date first assessed: 11/01/23  -RASHARD Time first assessed: 1312  -RASHARD Present on Original Admission: N  -RASHARD Side: Left  -RASHARD Location: second toe  -RASHARD Primary Wound Type: Blisters  -RASHARD      Row Name 11/05/23 0946          Plan of Care Review    Plan of Care Reviewed With patient  -DK     Progress no change  -DK       Row Name 11/05/23 0946          Positioning  and Restraints    Pre-Treatment Position in bed  -DK     Post Treatment Position bed  -DK     In Bed supine;call light within reach;encouraged to call for assist;exit alarm on;legs elevated;heels elevated  side rails up x 4  -DK       Row Name 11/05/23 0946          Therapy Assessment/Plan (PT)    Rehab Potential (PT) fair, will monitor progress closely  -DK     Criteria for Skilled Interventions Met (PT) skilled treatment is necessary  -DK     Therapy Frequency (PT) daily  -DK     Problem List (PT) problems related to;balance;mobility;strength;pain;range of motion (ROM)  -DK     Activity Limitations Related to Problem List (PT) unable to ambulate safely;unable to transfer safely  -DK       Row Name 11/05/23 0946          Progress Summary (PT)    Progress Toward Functional Goals (PT) progress toward functional goals is gradual  -               User Key  (r) = Recorded By, (t) = Taken By, (c) = Cosigned By      Initials Name Provider Type    Karon Jordan, RN Registered Nurse    Mckenna Landaverde PTA Physical Therapist Assistant    AS Angelina Alfredo, RN Registered Nurse    Katlyn Garcia RN Registered Nurse                    Physical Therapy Education       Title: PT OT SLP Therapies (Done)       Topic: Physical Therapy (Done)       Point: Mobility training (Done)       Learning Progress Summary             Patient Acceptance, E,TB, VU by ZT at 11/1/2023 1352    Acceptance, E, VU,DU by RF at 10/31/2023 1414    Acceptance, E, VU by AV at 10/31/2023 0927    Comment: DC OT services due to lack of progress    Acceptance, E,TB, VU by ZT at 10/24/2023 1157    Acceptance, E, VU,NR by RASHARD at 9/24/2023 0604    Acceptance, E, VU,DU by RF at 9/18/2023 1537    Acceptance, E, VU,DU by RF at 9/17/2023 1515    Acceptance, E, VU,DU by RF at 9/16/2023 1804    Acceptance, E, VU by AV at 9/13/2023 1059    Acceptance, E,TB, VU by  at 9/12/2023 1308                         Point: Home exercise program (Done)        Learning Progress Summary             Patient Acceptance, E, VU,DU by RF at 10/31/2023 1414    Acceptance, E, VU by AV at 10/31/2023 0927    Comment: DC OT services due to lack of progress    Acceptance, E, VU,NR by RASHARD at 9/24/2023 0604    Acceptance, E, VU,DU by RF at 9/18/2023 1537    Acceptance, E, VU,DU by RF at 9/17/2023 1515    Acceptance, E, VU,DU by RF at 9/16/2023 1804    Acceptance, E, VU by AV at 9/13/2023 1059    Acceptance, E,TB, VU by  at 9/12/2023 1308                         Point: Body mechanics (Done)       Learning Progress Summary             Patient Acceptance, E,TB, VU by ZT at 11/1/2023 1352    Acceptance, E, VU,DU by RF at 10/31/2023 1414    Acceptance, E, VU by AV at 10/31/2023 0927    Comment: DC OT services due to lack of progress    Acceptance, E,TB, VU by ZT at 10/24/2023 1157    Acceptance, E, VU,NR by RASHARD at 9/24/2023 0604    Acceptance, E, VU,DU by RF at 9/18/2023 1537    Acceptance, E, VU,DU by RF at 9/17/2023 1515    Acceptance, E, VU,DU by RF at 9/16/2023 1804    Acceptance, E, VU by AV at 9/13/2023 1059    Acceptance, E,TB, VU by  at 9/12/2023 1308                         Point: Precautions (Done)       Learning Progress Summary             Patient Acceptance, E,TB, VU by ZT at 11/1/2023 1352    Acceptance, E, VU,DU by RF at 10/31/2023 1414    Acceptance, E, VU by AV at 10/31/2023 0927    Comment: DC OT services due to lack of progress    Acceptance, E,TB, VU by ZT at 10/24/2023 1157    Acceptance, E, VU,NR by RASHARD at 9/24/2023 0604    Acceptance, E, VU,DU by RF at 9/18/2023 1537    Acceptance, E, VU,DU by RF at 9/17/2023 1515    Acceptance, E, VU,DU by RF at 9/16/2023 1804    Acceptance, E, VU by AV at 9/13/2023 1059    Acceptance, E,TB, VU by  at 9/12/2023 1308                                         User Key       Initials Effective Dates Name Provider Type Discipline    RASHARD 06/16/21 -  Selena Lindquist, RN Registered Nurse Nurse    AV 06/16/21 -  Uvaldo Christine, OT Occupational  Therapist OT    RF 01/19/22 -  Karl Baker, RN Registered Nurse Nurse     08/16/23 - 10/31/23 Gamal Simmons, PT Student PT Student PT    ZT 09/05/23 -  Oscar Shields, PT Student PT Student PT                  PT Recommendation and Plan  Planned Therapy Interventions (PT): balance training, bed mobility training, gait training, home exercise program, strengthening, transfer training  Therapy Frequency (PT): daily  Progress Summary (PT)  Progress Toward Functional Goals (PT): progress toward functional goals is gradual  Plan of Care Reviewed With: patient  Progress: no change   Outcome Measures       Row Name 11/05/23 0945 11/03/23 1156 11/02/23 1300       How much help from another person do you currently need...    Turning from your back to your side while in flat bed without using bedrails? 4  -DK 4  -MOE (r) ZT (t) MOE (c) 4  -MOE (r) ZT (t) MOE (c)    Moving from lying on back to sitting on the side of a flat bed without bedrails? 3  -DK 4  -MOE (r) ZT (t) MOE (c) 4  -MOE (r) ZT (t) MOE (c)    Moving to and from a bed to a chair (including a wheelchair)? 1  -DK 2  -MOE (r) ZT (t) MOE (c) 2  -MOE (r) ZT (t) MOE (c)    Standing up from a chair using your arms (e.g., wheelchair, bedside chair)? 2  -DK 2  -MOE (r) ZT (t) MOE (c) 2  -MOE (r) ZT (t) MOE (c)    Climbing 3-5 steps with a railing? 1  -DK 1  -MOE (r) ZT (t) MOE (c) 1  -MOE (r) ZT (t) MOE (c)    To walk in hospital room? 1  -DK 1  -MOE (r) ZT (t) MOE (c) 1  -MOE (r) ZT (t) MOE (c)    AM-PAC 6 Clicks Score (PT) 12  -DK 14  -MOE (r) ZT (t) 14  -MOE (r) ZT (t)    Highest level of mobility 4 --> Transferred to chair/commode  -DK 4 --> Transferred to chair/commode  -MOE (r) ZT (t) 4 --> Transferred to chair/commode  -MOE (r) ZT (t)       Functional Assessment    Outcome Measure Options AM-PAC 6 Clicks Basic Mobility (PT)  -DK -- --              User Key  (r) = Recorded By, (t) = Taken By, (c) = Cosigned By      Initials Name Provider Type    Mckenna Landaverde PTA Physical  Therapist Assistant    Merlin De La O, PT Physical Therapist    Oscar Ward, PT Student PT Student                     Time Calculation:    PT Charges       Row Name 11/05/23 0949             Time Calculation    PT Received On 11/05/23  -DK      PT Goal Re-Cert Due Date 11/10/23  -DK         Timed Charges    71210 - PT Therapeutic Exercise Minutes 14  -DK      62553 - PT Therapeutic Activity Minutes 3  -DK         Total Minutes    Timed Charges Total Minutes 17  -DK       Total Minutes 17  -DK                User Key  (r) = Recorded By, (t) = Taken By, (c) = Cosigned By      Initials Name Provider Type    Mckenna Landaverde PTA Physical Therapist Assistant                  Therapy Charges for Today       Code Description Service Date Service Provider Modifiers Qty    91153500649 HC PT THER PROC EA 15 MIN 11/5/2023 Mckenna Wong PTA GP 1            PT G-Codes  Outcome Measure Options: AM-PAC 6 Clicks Basic Mobility (PT)  AM-PAC 6 Clicks Score (PT): 12  AM-PAC 6 Clicks Score (OT): 16    Mckenna Wong PTA  11/5/2023

## 2023-11-05 NOTE — PLAN OF CARE
Goal Outcome Evaluation:  Plan of Care Reviewed With: patient        Progress: no change  Outcome Evaluation: alert and oriented, medicated with prn medication. wound care per orders. no other changes at this time.

## 2023-11-06 ENCOUNTER — HOSPITAL ENCOUNTER (INPATIENT)
Facility: HOSPITAL | Age: 63
DRG: 948 | End: 2023-11-06
Attending: INTERNAL MEDICINE | Admitting: INTERNAL MEDICINE
Payer: MEDICARE

## 2023-11-06 VITALS
HEART RATE: 92 BPM | OXYGEN SATURATION: 98 % | HEIGHT: 74 IN | DIASTOLIC BLOOD PRESSURE: 68 MMHG | TEMPERATURE: 98.2 F | RESPIRATION RATE: 18 BRPM | SYSTOLIC BLOOD PRESSURE: 134 MMHG | WEIGHT: 315 LBS | BODY MASS INDEX: 40.43 KG/M2

## 2023-11-06 DIAGNOSIS — R26.2 DIFFICULTY IN WALKING: Primary | ICD-10-CM

## 2023-11-06 DIAGNOSIS — E11.69 TYPE 2 DIABETES MELLITUS WITH OTHER SPECIFIED COMPLICATION, UNSPECIFIED WHETHER LONG TERM INSULIN USE: ICD-10-CM

## 2023-11-06 PROBLEM — R53.81 DEBILITY: Status: ACTIVE | Noted: 2023-11-06

## 2023-11-06 LAB
GLUCOSE BLDC GLUCOMTR-MCNC: 158 MG/DL (ref 70–99)
GLUCOSE BLDC GLUCOMTR-MCNC: 167 MG/DL (ref 70–99)
GLUCOSE BLDC GLUCOMTR-MCNC: 200 MG/DL (ref 70–99)
GLUCOSE BLDC GLUCOMTR-MCNC: 245 MG/DL (ref 70–99)

## 2023-11-06 PROCEDURE — 63710000001 INSULIN DETEMIR PER 5 UNITS: Performed by: INTERNAL MEDICINE

## 2023-11-06 PROCEDURE — 63710000001 INSULIN DETEMIR PER 5 UNITS: Performed by: FAMILY MEDICINE

## 2023-11-06 PROCEDURE — 99239 HOSP IP/OBS DSCHRG MGMT >30: CPT | Performed by: INTERNAL MEDICINE

## 2023-11-06 PROCEDURE — 63710000001 INSULIN LISPRO (HUMAN) PER 5 UNITS: Performed by: INTERNAL MEDICINE

## 2023-11-06 PROCEDURE — 82948 REAGENT STRIP/BLOOD GLUCOSE: CPT

## 2023-11-06 PROCEDURE — 97110 THERAPEUTIC EXERCISES: CPT

## 2023-11-06 RX ORDER — DIAPER,BRIEF,INFANT-TODD,DISP
1 EACH MISCELLANEOUS 2 TIMES DAILY
Status: DISCONTINUED | OUTPATIENT
Start: 2023-11-06 | End: 2023-11-08

## 2023-11-06 RX ORDER — ONDANSETRON 4 MG/1
4 TABLET, ORALLY DISINTEGRATING ORAL EVERY 6 HOURS PRN
Status: CANCELLED | OUTPATIENT
Start: 2023-11-06

## 2023-11-06 RX ORDER — DEXTROSE MONOHYDRATE 25 G/50ML
25 INJECTION, SOLUTION INTRAVENOUS
Status: ACTIVE | OUTPATIENT
Start: 2023-11-06

## 2023-11-06 RX ORDER — FAMOTIDINE 20 MG/1
40 TABLET, FILM COATED ORAL DAILY
Status: CANCELLED | OUTPATIENT
Start: 2023-11-07

## 2023-11-06 RX ORDER — DIPHENOXYLATE HYDROCHLORIDE AND ATROPINE SULFATE 2.5; .025 MG/1; MG/1
1 TABLET ORAL 4 TIMES DAILY PRN
Status: DISCONTINUED | OUTPATIENT
Start: 2023-11-06 | End: 2023-11-13

## 2023-11-06 RX ORDER — ACETAMINOPHEN 325 MG/1
650 TABLET ORAL EVERY 4 HOURS PRN
Status: CANCELLED | OUTPATIENT
Start: 2023-11-06

## 2023-11-06 RX ORDER — PREGABALIN 75 MG/1
75 CAPSULE ORAL 3 TIMES DAILY
Status: CANCELLED | OUTPATIENT
Start: 2023-11-06

## 2023-11-06 RX ORDER — SODIUM CHLORIDE 9 MG/ML
40 INJECTION, SOLUTION INTRAVENOUS AS NEEDED
Status: ACTIVE | OUTPATIENT
Start: 2023-11-06

## 2023-11-06 RX ORDER — ONDANSETRON 2 MG/ML
4 INJECTION INTRAMUSCULAR; INTRAVENOUS EVERY 6 HOURS PRN
Status: CANCELLED | OUTPATIENT
Start: 2023-11-06

## 2023-11-06 RX ORDER — IBUPROFEN 400 MG/1
400 TABLET ORAL EVERY 6 HOURS PRN
Status: DISCONTINUED | OUTPATIENT
Start: 2023-11-06 | End: 2024-01-16

## 2023-11-06 RX ORDER — ACETAMINOPHEN 325 MG/1
650 TABLET ORAL EVERY 4 HOURS PRN
Status: DISCONTINUED | OUTPATIENT
Start: 2023-11-06 | End: 2024-03-18

## 2023-11-06 RX ORDER — LISINOPRIL 2.5 MG/1
2.5 TABLET ORAL
Status: CANCELLED | OUTPATIENT
Start: 2023-11-07

## 2023-11-06 RX ORDER — ONDANSETRON 4 MG/1
4 TABLET, ORALLY DISINTEGRATING ORAL EVERY 6 HOURS PRN
Status: DISPENSED | OUTPATIENT
Start: 2023-11-06

## 2023-11-06 RX ORDER — SIMETHICONE 80 MG
80 TABLET,CHEWABLE ORAL 4 TIMES DAILY PRN
Status: DISPENSED | OUTPATIENT
Start: 2023-11-06

## 2023-11-06 RX ORDER — IBUPROFEN 600 MG/1
1 TABLET ORAL
Status: CANCELLED | OUTPATIENT
Start: 2023-11-06

## 2023-11-06 RX ORDER — ATORVASTATIN CALCIUM 10 MG/1
10 TABLET, FILM COATED ORAL NIGHTLY
Status: DISPENSED | OUTPATIENT
Start: 2023-11-06

## 2023-11-06 RX ORDER — DEXTROSE MONOHYDRATE 25 G/50ML
25 INJECTION, SOLUTION INTRAVENOUS
Status: CANCELLED | OUTPATIENT
Start: 2023-11-06

## 2023-11-06 RX ORDER — ATORVASTATIN CALCIUM 10 MG/1
10 TABLET, FILM COATED ORAL NIGHTLY
Status: CANCELLED | OUTPATIENT
Start: 2023-11-06

## 2023-11-06 RX ORDER — NICOTINE POLACRILEX 4 MG
15 LOZENGE BUCCAL
Status: ACTIVE | OUTPATIENT
Start: 2023-11-06

## 2023-11-06 RX ORDER — SACCHAROMYCES BOULARDII 250 MG
250 CAPSULE ORAL 2 TIMES DAILY
Status: DISCONTINUED | OUTPATIENT
Start: 2023-11-06 | End: 2024-01-06

## 2023-11-06 RX ORDER — DIAPER,BRIEF,INFANT-TODD,DISP
1 EACH MISCELLANEOUS 2 TIMES DAILY
Status: CANCELLED | OUTPATIENT
Start: 2023-11-06

## 2023-11-06 RX ORDER — IBUPROFEN 600 MG/1
1 TABLET ORAL
Status: ACTIVE | OUTPATIENT
Start: 2023-11-06

## 2023-11-06 RX ORDER — HYDROCODONE BITARTRATE AND ACETAMINOPHEN 7.5; 325 MG/1; MG/1
1 TABLET ORAL EVERY 6 HOURS PRN
Status: CANCELLED | OUTPATIENT
Start: 2023-11-06

## 2023-11-06 RX ORDER — INSULIN LISPRO 100 [IU]/ML
5 INJECTION, SOLUTION INTRAVENOUS; SUBCUTANEOUS
Status: DISCONTINUED | OUTPATIENT
Start: 2023-11-06 | End: 2023-11-14

## 2023-11-06 RX ORDER — NICOTINE POLACRILEX 4 MG
15 LOZENGE BUCCAL
Status: CANCELLED | OUTPATIENT
Start: 2023-11-06

## 2023-11-06 RX ORDER — PREGABALIN 75 MG/1
75 CAPSULE ORAL 3 TIMES DAILY
Status: DISCONTINUED | OUTPATIENT
Start: 2023-11-06 | End: 2023-11-07

## 2023-11-06 RX ORDER — INSULIN LISPRO 100 [IU]/ML
5 INJECTION, SOLUTION INTRAVENOUS; SUBCUTANEOUS
Status: CANCELLED | OUTPATIENT
Start: 2023-11-06

## 2023-11-06 RX ORDER — CHOLECALCIFEROL (VITAMIN D3) 125 MCG
1000 CAPSULE ORAL DAILY
Status: DISPENSED | OUTPATIENT
Start: 2023-11-07

## 2023-11-06 RX ORDER — INSULIN LISPRO 100 [IU]/ML
4-24 INJECTION, SOLUTION INTRAVENOUS; SUBCUTANEOUS
Status: DISPENSED | OUTPATIENT
Start: 2023-11-06

## 2023-11-06 RX ORDER — BACLOFEN 10 MG/1
10 TABLET ORAL 3 TIMES DAILY PRN
Status: CANCELLED | OUTPATIENT
Start: 2023-11-06

## 2023-11-06 RX ORDER — SIMETHICONE 80 MG
80 TABLET,CHEWABLE ORAL 4 TIMES DAILY PRN
Status: CANCELLED | OUTPATIENT
Start: 2023-11-06

## 2023-11-06 RX ORDER — SODIUM CHLORIDE 0.9 % (FLUSH) 0.9 %
10 SYRINGE (ML) INJECTION AS NEEDED
Status: ACTIVE | OUTPATIENT
Start: 2023-11-06

## 2023-11-06 RX ORDER — ONDANSETRON 2 MG/ML
4 INJECTION INTRAMUSCULAR; INTRAVENOUS EVERY 6 HOURS PRN
Status: ACTIVE | OUTPATIENT
Start: 2023-11-06

## 2023-11-06 RX ORDER — FAMOTIDINE 20 MG/1
40 TABLET, FILM COATED ORAL DAILY
Status: DISCONTINUED | OUTPATIENT
Start: 2023-11-07 | End: 2023-11-13

## 2023-11-06 RX ORDER — SACCHAROMYCES BOULARDII 250 MG
250 CAPSULE ORAL 2 TIMES DAILY
Status: CANCELLED | OUTPATIENT
Start: 2023-11-06

## 2023-11-06 RX ORDER — CHOLESTYRAMINE 4 G/9G
1 POWDER, FOR SUSPENSION ORAL EVERY 12 HOURS SCHEDULED
Status: DISCONTINUED | OUTPATIENT
Start: 2023-11-06 | End: 2023-11-13

## 2023-11-06 RX ORDER — HYDROCODONE BITARTRATE AND ACETAMINOPHEN 7.5; 325 MG/1; MG/1
1 TABLET ORAL EVERY 6 HOURS PRN
Status: DISCONTINUED | OUTPATIENT
Start: 2023-11-06 | End: 2023-11-21

## 2023-11-06 RX ORDER — BACLOFEN 10 MG/1
10 TABLET ORAL 3 TIMES DAILY PRN
Status: DISCONTINUED | OUTPATIENT
Start: 2023-11-06 | End: 2024-01-23

## 2023-11-06 RX ORDER — LISINOPRIL 2.5 MG/1
2.5 TABLET ORAL
Status: DISPENSED | OUTPATIENT
Start: 2023-11-07

## 2023-11-06 RX ORDER — SODIUM CHLORIDE 0.9 % (FLUSH) 0.9 %
10 SYRINGE (ML) INJECTION AS NEEDED
Status: CANCELLED | OUTPATIENT
Start: 2023-11-06

## 2023-11-06 RX ORDER — DIPHENOXYLATE HYDROCHLORIDE AND ATROPINE SULFATE 2.5; .025 MG/1; MG/1
1 TABLET ORAL 4 TIMES DAILY PRN
Status: CANCELLED | OUTPATIENT
Start: 2023-11-06

## 2023-11-06 RX ORDER — SODIUM CHLORIDE 9 MG/ML
40 INJECTION, SOLUTION INTRAVENOUS AS NEEDED
Status: CANCELLED | OUTPATIENT
Start: 2023-11-06

## 2023-11-06 RX ORDER — CHOLESTYRAMINE 4 G/9G
1 POWDER, FOR SUSPENSION ORAL EVERY 12 HOURS SCHEDULED
Status: CANCELLED | OUTPATIENT
Start: 2023-11-06

## 2023-11-06 RX ORDER — AMMONIUM LACTATE 12 G/100G
LOTION TOPICAL
Status: DISCONTINUED | OUTPATIENT
Start: 2023-11-07 | End: 2023-11-17

## 2023-11-06 RX ORDER — IBUPROFEN 400 MG/1
400 TABLET ORAL EVERY 6 HOURS PRN
Status: CANCELLED | OUTPATIENT
Start: 2023-11-06

## 2023-11-06 RX ORDER — CHOLECALCIFEROL (VITAMIN D3) 125 MCG
1000 CAPSULE ORAL DAILY
Status: CANCELLED | OUTPATIENT
Start: 2023-11-07

## 2023-11-06 RX ORDER — AMMONIUM LACTATE 12 G/100G
LOTION TOPICAL
Status: CANCELLED | OUTPATIENT
Start: 2023-11-07

## 2023-11-06 RX ORDER — INSULIN LISPRO 100 [IU]/ML
4-24 INJECTION, SOLUTION INTRAVENOUS; SUBCUTANEOUS
Status: CANCELLED | OUTPATIENT
Start: 2023-11-06

## 2023-11-06 RX ADMIN — FAMOTIDINE 40 MG: 20 TABLET, FILM COATED ORAL at 08:55

## 2023-11-06 RX ADMIN — ATORVASTATIN CALCIUM 10 MG: 10 TABLET, FILM COATED ORAL at 20:19

## 2023-11-06 RX ADMIN — EMPAGLIFLOZIN 10 MG: 10 TABLET, FILM COATED ORAL at 08:55

## 2023-11-06 RX ADMIN — INSULIN LISPRO 5 UNITS: 100 INJECTION, SOLUTION INTRAVENOUS; SUBCUTANEOUS at 17:56

## 2023-11-06 RX ADMIN — HYDROCODONE BITARTRATE AND ACETAMINOPHEN 1 TABLET: 7.5; 325 TABLET ORAL at 04:22

## 2023-11-06 RX ADMIN — INSULIN LISPRO 8 UNITS: 100 INJECTION, SOLUTION INTRAVENOUS; SUBCUTANEOUS at 08:52

## 2023-11-06 RX ADMIN — INSULIN DETEMIR 70 UNITS: 100 INJECTION, SOLUTION SUBCUTANEOUS at 08:53

## 2023-11-06 RX ADMIN — BACITRACIN 0.9 G: 500 OINTMENT TOPICAL at 20:21

## 2023-11-06 RX ADMIN — IBUPROFEN 400 MG: 400 TABLET, FILM COATED ORAL at 11:00

## 2023-11-06 RX ADMIN — BACITRACIN 0.9 G: 500 OINTMENT TOPICAL at 08:56

## 2023-11-06 RX ADMIN — INSULIN LISPRO 8 UNITS: 100 INJECTION, SOLUTION INTRAVENOUS; SUBCUTANEOUS at 12:47

## 2023-11-06 RX ADMIN — HYDROCODONE BITARTRATE AND ACETAMINOPHEN 1 TABLET: 7.5; 325 TABLET ORAL at 22:40

## 2023-11-06 RX ADMIN — PREGABALIN 75 MG: 75 CAPSULE ORAL at 16:17

## 2023-11-06 RX ADMIN — PREGABALIN 75 MG: 75 CAPSULE ORAL at 08:55

## 2023-11-06 RX ADMIN — BACLOFEN 10 MG: 10 TABLET ORAL at 20:20

## 2023-11-06 RX ADMIN — INSULIN LISPRO 5 UNITS: 100 INJECTION, SOLUTION INTRAVENOUS; SUBCUTANEOUS at 08:54

## 2023-11-06 RX ADMIN — TUBERCULIN PURIFIED PROTEIN DERIVATIVE 5 UNITS: 5 INJECTION, SOLUTION INTRADERMAL at 20:25

## 2023-11-06 RX ADMIN — LISINOPRIL 2.5 MG: 2.5 TABLET ORAL at 08:54

## 2023-11-06 RX ADMIN — Medication: at 09:04

## 2023-11-06 RX ADMIN — IBUPROFEN 400 MG: 400 TABLET, FILM COATED ORAL at 04:22

## 2023-11-06 RX ADMIN — Medication 250 MG: at 20:19

## 2023-11-06 RX ADMIN — BACLOFEN 10 MG: 10 TABLET ORAL at 11:00

## 2023-11-06 RX ADMIN — INSULIN LISPRO 5 UNITS: 100 INJECTION, SOLUTION INTRAVENOUS; SUBCUTANEOUS at 12:48

## 2023-11-06 RX ADMIN — CYANOCOBALAMIN TAB 500 MCG 1000 MCG: 500 TAB at 08:55

## 2023-11-06 RX ADMIN — BACLOFEN 10 MG: 10 TABLET ORAL at 02:19

## 2023-11-06 RX ADMIN — PREGABALIN 75 MG: 75 CAPSULE ORAL at 20:18

## 2023-11-06 RX ADMIN — INSULIN LISPRO 4 UNITS: 100 INJECTION, SOLUTION INTRAVENOUS; SUBCUTANEOUS at 20:20

## 2023-11-06 RX ADMIN — INSULIN DETEMIR 65 UNITS: 100 INJECTION, SOLUTION SUBCUTANEOUS at 20:20

## 2023-11-06 RX ADMIN — DIPHENOXYLATE HYDROCHLORIDE AND ATROPINE SULFATE 1 TABLET: 2.5; .025 TABLET ORAL at 00:44

## 2023-11-06 RX ADMIN — HYDROCODONE BITARTRATE AND ACETAMINOPHEN 1 TABLET: 7.5; 325 TABLET ORAL at 11:00

## 2023-11-06 RX ADMIN — APIXABAN 5 MG: 5 TABLET, FILM COATED ORAL at 08:55

## 2023-11-06 RX ADMIN — Medication 250 MG: at 08:55

## 2023-11-06 RX ADMIN — IBUPROFEN 400 MG: 400 TABLET ORAL at 20:18

## 2023-11-06 RX ADMIN — APIXABAN 5 MG: 5 TABLET, FILM COATED ORAL at 20:18

## 2023-11-06 RX ADMIN — INSULIN LISPRO 4 UNITS: 100 INJECTION, SOLUTION INTRAVENOUS; SUBCUTANEOUS at 17:55

## 2023-11-06 NOTE — CASE MANAGEMENT/SOCIAL WORK
Explained and provided patient with detailed summary of daily room charges that will be billed to him when his insurance days are exhausted.  Verbalized understanding and signed.  Provided patient with copy.

## 2023-11-06 NOTE — THERAPY TREATMENT NOTE
Acute Care - Physical Therapy Treatment Note  KEO Dominguez     Patient Name: Jose Shaikh  : 1960  MRN: [id number]  Today's Date: 2023      Visit Dx:     ICD-10-CM ICD-9-CM   1. Generalized weakness  R53.1 780.79   2. Hyponatremia  E87.1 276.1   3. Difficulty in walking  R26.2 719.7   4. Decreased activities of daily living (ADL)  Z78.9 V49.89   5. Difficulty walking  R26.2 719.7     Patient Active Problem List   Diagnosis    Diabetic ulcer of left heel associated with type 2 DM    Acute osteomyelitis of left calcaneus     Diabetic ulcer of left heel associated with type 2 DM    Diabetic ulcer of right midfoot associated with type 2 DM    Paroxysmal atrial fibrillation    Essential hypertension    Hyperlipidemia LDL goal <100    Cellulitis and abscess of foot    High alkaline phosphatase level    Osteomyelitis    Onychomycosis    Onychocryptosis    Foot pain, bilateral    Osteomyelitis of foot, right, acute    Cellulitis of right foot    Type 2 diabetes mellitus, with long-term current use of insulin    Class 3 severe obesity due to excess calories with serious comorbidity and body mass index (BMI) of 45.0 to 49.9 in adult    Anxiety disorder, unspecified    Claustrophobia    Dependence on wheelchair    Depression, unspecified    Long term (current) use of anticoagulants    Long term (current) use of oral hypoglycemic drugs    Wound of foot    Non-prs chronic ulcer oth prt r foot limited to brkdwn skin    Orthostatic hypotension    Other chronic osteomyelitis, right ankle and foot    Personal history of nicotine dependence    Thrombocytopenia, unspecified    Unspecified open wound, right foot, initial encounter    Diabetic foot infection    Subacute osteomyelitis of right foot    Right foot pain    Sepsis    Onychomycosis    Foot pain, left    Impaired mobility and ADLs    Absence of toe of right foot    Corns and callosity    Disability of walking    Fracture    Limb swelling    Polyneuropathy     Pressure ulcer, stage 1    Shortness of breath    Generalized weakness     Past Medical History:   Diagnosis Date    Absence of toe of right foot     Acute osteomyelitis of left calcaneus  8/18/2021    Anxiety and depression     Arthritis     Claustrophobia     Corns and callus     Diabetic ulcer of left heel associated with type 2 DM 8/18/2021    Diabetic ulcer of left heel associated with type 2 DM 7/6/2021    Diabetic ulcer of right midfoot associated with type 2 DM 8/18/2021    Difficulty walking     Essential hypertension 8/31/2021    Hammertoe     Hyperlipidemia LDL goal <100 8/31/2021    Ingrown toenail     Obesity     Paroxysmal atrial fibrillation 8/31/2021    Polyneuropathy     Pressure ulcer, stage 1     Tinea unguium     Type 2 diabetes mellitus with polyneuropathy      Past Surgical History:   Procedure Laterality Date    CYST REMOVAL      center of back; benign    INCISION AND DRAINAGE ABSCESS      back    INCISION AND DRAINAGE LEG Right 12/10/2021    Procedure: INCISION AND DRAINAGE LOWER EXTREMITY;  Surgeon: Ash Leyva DPM;  Location: [facility] (formerly [facility]);  Service: Podiatry;  Laterality: Right;    OTHER SURGICAL HISTORY      Surgical clips left foot    TOE SURGERY Right     Removal of 5th toe    TRANS METATARSAL AMPUTATION Right 12/2/2021    Procedure: AMPUTATION TRANS METATARSAL;  Surgeon: Ash Leyva DPM;  Location: [facility] OR;  Service: Podiatry;  Laterality: Right;    WRIST SURGERY Left     repair of injury     PT Assessment (last 12 hours)       PT Evaluation and Treatment       Row Name 11/06/23 1300          Physical Therapy Time and Intention    Subjective Information complains of;pain (P)   -ZT     Document Type therapy note (daily note) (P)   -ZT     Mode of Treatment individual therapy;physical therapy (P)   -ZT     Patient Effort fair (P)   -ZT       Row Name 11/06/23 1300          General Information    Patient Profile Reviewed yes (P)   -ZT     Patient  Observations alert;cooperative;agree to therapy (P)   -ZT     Existing Precautions/Restrictions fall (P)   -       Row Name 11/06/23 1300          Bed Mobility    Bed Mobility other (see comments) (P)   Pt remained in supine during tx session  -       Row Name 11/06/23 1300          Transfers    Transfers other (see comments) (P)   Pt declined Tfs  -       Row Name 11/06/23 1300          Gait/Stairs (Locomotion)    Gait/Stairs Locomotion other (see comments) (P)   Pt declined AMB at this time  -       Row Name 11/06/23 1300          Safety Issues, Functional Mobility    Impairments Affecting Function (Mobility) balance;endurance/activity tolerance (P)   -       Row Name 11/06/23 1300          Balance    Balance Assessment other (see comments) (P)   Pt performed TherEx in supine  -       Row Name 11/06/23 1300          Motor Skills    Therapeutic Exercise hip;knee;ankle (P)   -       Row Name 11/06/23 1300          Hip (Therapeutic Exercise)    Hip Strengthening (Therapeutic Exercise) aBduction;aDduction;20 repititions;other (see comments) (P)   Pt performed 20 reps of glute sets and hip adduction/abduction  -       Row Name 11/06/23 1300          Knee (Therapeutic Exercise)    Knee Strengthening (Therapeutic Exercise) SLR (straight leg raise);20 repititions;other (see comments) (P)   Pt performed 20 reps of quad sets and SLR  -       Row Name 11/06/23 1300          Ankle (Therapeutic Exercise)    Ankle Strengthening (Therapeutic Exercise) dorsiflexion;plantarflexion;20 repititions;other (see comments) (P)   Pt perforomed 20 reps of ankle pumps  -       Row Name             Wound 12/02/21 Right anterior foot Incision    Wound - Properties Group Placement Date: 12/02/21  -ES Side: Right  -ES Orientation: anterior  -ES Location: foot  -ES Primary Wound Type: Incision  -ES    Retired Wound - Properties Group Placement Date: 12/02/21  -ES Side: Right  -ES Orientation: anterior  -ES Location: foot  -ES  Primary Wound Type: Incision  -ES    Retired Wound - Properties Group Date first assessed: 12/02/21  -ES Side: Right  -ES Location: foot  -ES Primary Wound Type: Incision  -ES      Row Name             Wound 10/17/23 0546 Bilateral perirectal MASD (Moisture associated skin damage)    Wound - Properties Group Placement Date: 10/17/23  -AS Placement Time: 0546  -AS Present on Original Admission: N  -AS Side: Bilateral  -AS Location: perirectal  -AS Primary Wound Type: MASD  -AS    Retired Wound - Properties Group Placement Date: 10/17/23  -AS Placement Time: 0546  -AS Present on Original Admission: N  -AS Side: Bilateral  -AS Location: perirectal  -AS Primary Wound Type: MASD  -AS    Retired Wound - Properties Group Date first assessed: 10/17/23  -AS Time first assessed: 0546  -AS Present on Original Admission: N  -AS Side: Bilateral  -AS Location: perirectal  -AS Primary Wound Type: MASD  -AS      Row Name             Wound 11/01/23 1312 Left anterior second toe Blisters    Wound - Properties Group Placement Date: 11/01/23  -RASHARD Placement Time: 1312  -RASHARD Present on Original Admission: N  -RASHARD Side: Left  -RASHARD Orientation: anterior  -RASHADR Location: second toe  -RASHARD Primary Wound Type: Blisters  -RASHARD    Retired Wound - Properties Group Placement Date: 11/01/23  -RASHARD Placement Time: 1312  -RASHARD Present on Original Admission: N  -RASHARD Side: Left  -RASHARD Orientation: anterior  -RASHARD Location: second toe  -RASHARD Primary Wound Type: Blisters  -RASHARD    Retired Wound - Properties Group Date first assessed: 11/01/23  -RASHARD Time first assessed: 1312  -RASHARD Present on Original Admission: N  -RASHARD Side: Left  -RASHARD Location: second toe  -RASHARD Primary Wound Type: Blisters  -RASHARD      Row Name 11/06/23 1300          Plan of Care Review    Plan of Care Reviewed With patient (P)   -ZT       Row Name 11/06/23 1300          Positioning and Restraints    Pre-Treatment Position in bed (P)   -ZT     Post Treatment Position bed (P)   -ZT     In Bed call light within  reach;encouraged to call for assist;exit alarm on (P)   -ZT       Row Name 11/06/23 1300          Therapy Assessment/Plan (PT)    Rehab Potential (PT) fair, will monitor progress closely (P)   -ZT     Criteria for Skilled Interventions Met (PT) yes;skilled treatment is necessary (P)   -ZT     Therapy Frequency (PT) daily (P)   -ZT     Predicted Duration of Therapy Intervention (PT) 10 days (P)   -ZT     Problem List (PT) problems related to;balance;mobility;strength;pain;range of motion (ROM) (P)   -ZT     Activity Limitations Related to Problem List (PT) unable to ambulate safely;unable to transfer safely (P)   -ZT       Row Name 11/06/23 1300          Progress Summary (PT)    Progress Toward Functional Goals (PT) progress toward functional goals is good (P)   -ZT     Daily Progress Summary (PT) Pt presents in a deconditioned state secondary to recent hospitalization. He continues to require skilled PT services to address his BLE weakness and endurance deficits to improve his functional independence. (P)   -ZT       Row Name 11/06/23 1300          Therapy Plan Review/Discharge Plan (PT)    Therapy Plan Review (PT) evaluation/treatment results reviewed;care plan/treatment goals reviewed;participants included;patient (P)   -ZT               User Key  (r) = Recorded By, (t) = Taken By, (c) = Cosigned By      Initials Name Provider Type    Karon Jordan, RN Registered Nurse    AS Angelina Alfredo, RN Registered Nurse    Katlyn Garcia, RN Registered Nurse    Oscar Ward, PT Student PT Student                    Physical Therapy Education       Title: PT OT SLP Therapies (Resolved)       Topic: Physical Therapy (Resolved)       Point: Mobility training (Resolved)       Learning Progress Summary             Patient Acceptance, E,TB, VU by ZT at 11/1/2023 1352    Acceptance, E, VU,DU by RF at 10/31/2023 1414    Acceptance, E, VU by AV at 10/31/2023 0973    Comment: DC OT services due to lack of progress     Acceptance, E,TB, VU by ZT at 10/24/2023 1157    Acceptance, E, VU,NR by RASHARD at 9/24/2023 0604    Acceptance, E, VU,DU by RF at 9/18/2023 1537    Acceptance, E, VU,DU by RF at 9/17/2023 1515    Acceptance, E, VU,DU by RF at 9/16/2023 1804    Acceptance, E, VU by AV at 9/13/2023 1059    Acceptance, E,TB, VU by  at 9/12/2023 1308                         Point: Home exercise program (Resolved)       Learning Progress Summary             Patient Acceptance, E, VU,DU by RF at 10/31/2023 1414    Acceptance, E, VU by AV at 10/31/2023 0927    Comment: DC OT services due to lack of progress    Acceptance, E, VU,NR by RASHARD at 9/24/2023 0604    Acceptance, E, VU,DU by RF at 9/18/2023 1537    Acceptance, E, VU,DU by RF at 9/17/2023 1515    Acceptance, E, VU,DU by RF at 9/16/2023 1804    Acceptance, E, VU by AV at 9/13/2023 1059    Acceptance, E,TB, VU by  at 9/12/2023 1308                         Point: Body mechanics (Resolved)       Learning Progress Summary             Patient Acceptance, E,TB, VU by ZT at 11/1/2023 1352    Acceptance, E, VU,DU by RF at 10/31/2023 1414    Acceptance, E, VU by AV at 10/31/2023 0927    Comment: DC OT services due to lack of progress    Acceptance, E,TB, VU by ZT at 10/24/2023 1157    Acceptance, E, VU,NR by RASHARD at 9/24/2023 0604    Acceptance, E, VU,DU by RF at 9/18/2023 1537    Acceptance, E, VU,DU by RF at 9/17/2023 1515    Acceptance, E, VU,DU by RF at 9/16/2023 1804    Acceptance, E, VU by AV at 9/13/2023 1059    Acceptance, E,TB, VU by  at 9/12/2023 1308                         Point: Precautions (Resolved)       Learning Progress Summary             Patient Acceptance, E,TB, VU by ZT at 11/1/2023 1352    Acceptance, E, VU,DU by RF at 10/31/2023 1414    Acceptance, E, VU by AV at 10/31/2023 0927    Comment: DC OT services due to lack of progress    Acceptance, E,TB, VU by LEROY at 10/24/2023 1157    Acceptance, E, VU,NR by RASHARD at 9/24/2023 0604    Acceptance, E, VU,DU by RF at 9/18/2023  1537    Acceptance, E, VU,DU by  at 9/17/2023 1515    Acceptance, E, VU,DU by  at 9/16/2023 1804    Acceptance, E, VU by  at 9/13/2023 1059    Acceptance, E,TB, VU by  at 9/12/2023 1308                                         User Key       Initials Effective Dates Name Provider Type Discipline     06/16/21 -  Selena Lindquist, RN Registered Nurse Nurse    AV 06/16/21 -  Uvaldo Christine OT Occupational Therapist OT     01/19/22 -  Karl Baker, RN Registered Nurse Nurse     08/16/23 - 10/31/23 Gamal Simmons, PT Student PT Student PT    ZT 09/05/23 -  Oscar Shields, PT Student PT Student PT                  PT Recommendation and Plan  Anticipated Discharge Disposition (PT): (P) inpatient rehabilitation facility  Planned Therapy Interventions (PT): (P) balance training, bed mobility training, gait training, stair training, strengthening, transfer training  Therapy Frequency (PT): (P) daily  Progress Summary (PT)  Progress Toward Functional Goals (PT): (P) progress toward functional goals is good  Daily Progress Summary (PT): (P) Pt presents in a deconditioned state secondary to recent hospitalization. He continues to require skilled PT services to address his BLE weakness and endurance deficits to improve his functional independence.  Plan of Care Reviewed With: (P) patient  Outcome Evaluation: Pt presents with decreased BLE strength and activity endurance. He can only tolerate standing for short period of time before his hip pain becomes unbearable and his legs fatigue. He continues to require skilled PT services to address these deficits so that he can safely improve his functional independence.   Outcome Measures       Row Name 11/06/23 1300 11/05/23 0945          How much help from another person do you currently need...    Turning from your back to your side while in flat bed without using bedrails? 4 (P)   -ZT 4  -DK     Moving from lying on back to sitting on the side of a flat bed  without bedrails? 3 (P)   -ZT 3  -DK     Moving to and from a bed to a chair (including a wheelchair)? 2 (P)   -ZT 1  -DK     Standing up from a chair using your arms (e.g., wheelchair, bedside chair)? 1 (P)   -ZT 2  -DK     Climbing 3-5 steps with a railing? 1 (P)   -ZT 1  -DK     To walk in hospital room? 1 (P)   -ZT 1  -DK     AM-PAC 6 Clicks Score (PT) 12 (P)   -ZT 12  -DK     Highest level of mobility 4 --> Transferred to chair/commode (P)   -ZT 4 --> Transferred to chair/commode  -DK        Functional Assessment    Outcome Measure Options -- AM-PAC 6 Clicks Basic Mobility (PT)  -DK               User Key  (r) = Recorded By, (t) = Taken By, (c) = Cosigned By      Initials Name Provider Type    Mckenna Landaverde, PTA Physical Therapist Assistant    Oscar Ward, PT Student PT Student                     Time Calculation:    PT Charges       Row Name 11/06/23 1321             Time Calculation    PT Received On 11/06/23 (P)   -ZT         Timed Charges    79267 - PT Therapeutic Exercise Minutes 15 (P)   -ZT         Total Minutes    Timed Charges Total Minutes 15 (P)   -ZT       Total Minutes 15 (P)   -ZT                User Key  (r) = Recorded By, (t) = Taken By, (c) = Cosigned By      Initials Name Provider Type    Oscar Ward, PT Student PT Student                      PT G-Codes  Outcome Measure Options: AM-PAC 6 Clicks Basic Mobility (PT)  AM-PAC 6 Clicks Score (PT): (P) 12  AM-PAC 6 Clicks Score (OT): 16    Oscar Shields PT Student  11/6/2023

## 2023-11-06 NOTE — PLAN OF CARE
Goal Outcome Evaluation:  Plan of Care Reviewed With: patient        Progress: no change  Outcome Evaluation: PRN pain meds given per patient request. Multiple soft to loose bowel movement overnight, with one more liquified. PRN lomotil given. Patient awake off and on overnight. Says he may be going to SNF unit monday.

## 2023-11-06 NOTE — DISCHARGE SUMMARY
[facility]         HOSPITALIST  DISCHARGE SUMMARY    Patient Name: Jose Shaikh  : 1960  MRN: [id number]    Date of Admission: 9/10/2023  Date of Discharge:  2023  Primary Care Physician: Delia Cervantes APRN    Consults       Date and Time Order Name Status Description    10/23/2023  3:13 PM Inpatient Psychiatrist Consult Completed     10/2/2023  9:54 AM Inpatient Orthopedic Surgery Consult      2023  8:08 AM Inpatient Orthopedic Surgery Consult Completed     9/10/2023 10:14 PM Inpatient Hospitalist Consult              Active and Resolved Hospital Problems:  Generalized weakness  Deconditioning  Type 2 diabetes mellitus  Essential hypertension  Chronic paroxysmal atrial fibrillation on Eliquis  Obesity (BMI: 42.88)  Debility with wheelchair dependence  Severe degenerative joint disease of lower extremities  Chronic wound  Thrombocytopenia    Hospital Course     Hospital Course:  Jose Shaikh is a 63 y.o. male  initially hospitalized on 9/10/2023, prolonged hospitalization for treatment of management of generalized weakness, deconditioning, with acute issues of diarrhea, C. difficile negative.  Diarrhea improved.  Had small hematoma after ambulating on his foot.  Unfortunately with exhaustive efforts to try to place this gentleman after 39 days of hospitalization, we are unable to find a facility that will accept him.  He has no further acute needs or requirements for inpatient monitoring and management, his labs and vitals are stable, he is tolerating oral intake, and has been refusing turns and repositionings and other interventions with nursing staff despite recommendations.  Patient discharged in hemodynamically stable addition on 10/19/2023 to follow-up with PCP within 1 week.  Unfortunately we cannot solve all of his social issues during this hospitalization due to lack of resources and participation from the patient's perspective despite all our efforts.  Patient has a  financial means of making arrangements at home.  Patient appealed his discharge.  Lost his appeal.  LCD: 10/20/23, PFL beginning: 10/21/23 @ 12pm.  Patient has been accepted to our skilled nursing facility for continued rehab and to work on mobility and wound care.    Day of Discharge     Vital Signs:  Temp:  [97.3 °F (36.3 °C)-98.2 °F (36.8 °C)] 98.2 °F (36.8 °C)  Heart Rate:  [83-98] 92  Resp:  [16-18] 18  BP: ()/(52-79) 134/68    Physical Exam:   GEN: No acute distress  HEENT: Moist mucous membranes  LUNGS: Equal chest rise bilaterally  CARDIAC: Regular rate and rhythm  NEURO: Moving all 4 extremities spontaneously  SKIN: No obvious breakdown    Discharge Details        Discharge Medications        New Medications        Instructions Start Date   empagliflozin 10 MG tablet tablet  Commonly known as: JARDIANCE   10 mg, Oral, Daily      famotidine 40 MG tablet  Commonly known as: PEPCID   40 mg, Oral, Daily      lisinopril 2.5 MG tablet  Commonly known as: PRINIVIL,ZESTRIL   2.5 mg, Oral, Every 24 Hours Scheduled             Changes to Medications        Instructions Start Date   Levemir FlexPen 100 UNIT/ML injection  Generic drug: insulin detemir  What changed: how much to take   40 Units, Subcutaneous, 2 Times Daily             Continue These Medications        Instructions Start Date   acetaminophen 650 MG 8 hr tablet  Commonly known as: TYLENOL   650 mg, Oral, Every 8 Hours PRN      apixaban 5 MG tablet tablet  Commonly known as: ELIQUIS   5 mg, Oral, Every 12 Hours Scheduled      digoxin 125 MCG tablet  Commonly known as: LANOXIN   125 mcg, Oral, Daily Digoxin      HumaLOG KwikPen 100 UNIT/ML solution pen-injector  Generic drug: Insulin Lispro (1 Unit Dial)   INJECT 30 UNITS UNDER THE SKIN INTO THE APPROPRIATE AREA AS DIRECTED 3 (THREE) TIMES A DAY BEFORE MEALS.      HYDROcodone-acetaminophen 7.5-325 MG per tablet  Commonly known as: NORCO   1 tablet, Oral, Every 4 Hours PRN      naloxone 4 MG/0.1ML  nasal spray  Commonly known as: NARCAN   Call 911. Don't prime. [place] in 1 nostril for overdose. Repeat in 2-3 minutes in other nostril if no or minimal breathing/responsiveness.      pregabalin 25 MG capsule  Commonly known as: LYRICA   25 mg, Oral, 3 Times Daily      simvastatin 20 MG tablet  Commonly known as: ZOCOR   20 mg, Oral, Nightly               Allergies   Allergen Reactions    Adhesive Tape Rash       Discharge Disposition:  Skilled Nursing Facility ([facility])    Diet:  Hospital:  Diet Order   Procedures    Diet: Diabetic Diets; Consistent Carbohydrate; Texture: Regular Texture (IDDSI 7); Fluid Consistency: Thin (IDDSI 0)       Discharge Activity:       CODE STATUS:  Code Status and Medical Interventions:   Ordered at: 09/21/23 1000     Code Status (Patient has no pulse and is not breathing):    CPR (Attempt to Resuscitate)     Medical Interventions (Patient has pulse or is breathing):    Full Support       No future appointments.        Pertinent  and/or Most Recent Results     IMAGING:  No results found.    LAB RESULTS:      Lab 11/05/23  0541   WBC 4.20   HEMOGLOBIN 9.0*   HEMATOCRIT 30.0*   PLATELETS 99*   NEUTROS ABS 2.45   IMMATURE GRANS (ABS) 0.01   LYMPHS ABS 1.01   MONOS ABS 0.59   EOS ABS 0.09   MCV 79.4         Lab 11/05/23  0541   SODIUM 137   POTASSIUM 4.1   CHLORIDE 105   CO2 25.0   ANION GAP 7.0   BUN 15   CREATININE 0.54*   EGFR 110.6   GLUCOSE 147*   CALCIUM 8.6   MAGNESIUM 2.0   PHOSPHORUS 4.0         Lab 11/05/23  0541   TOTAL PROTEIN 6.0   ALBUMIN 3.1*   GLOBULIN 2.9   ALT (SGPT) 41   AST (SGOT) 39   BILIRUBIN 0.6   ALK PHOS 226*                     Brief Urine Lab Results  (Last result in the past 365 days)        Color   Clarity   Blood   Leuk Est   Nitrite   Protein   CREAT   Urine HCG        09/11/23 2200 Dark Yellow   Clear   Negative   Negative   Negative   Negative                 Microbiology Results (last 10 days)       ** No results found for the last 240  hours. **          Results for orders placed during the hospital encounter of 03/27/23    Duplex Venous Lower Extremity - Left CAR    Interpretation Summary    Technically limited study.  No evidence of deep venous thrombosis in the left common femoral, popliteal, or posterior tibial veins.  Other left lower extremity veins were suboptimally imaged    Results for orders placed during the hospital encounter of 03/27/23    Duplex Venous Lower Extremity - Left CAR    Interpretation Summary    Technically limited study.  No evidence of deep venous thrombosis in the left common femoral, popliteal, or posterior tibial veins.  Other left lower extremity veins were suboptimally imaged        Time spent on Discharge including face to face service: Greater than 30 minutes      Electronically signed by Max Mehta MD, 11/06/23, 11:43 AM EST.

## 2023-11-06 NOTE — PLAN OF CARE
Goal Outcome Evaluation:  Plan of Care Reviewed With: patient        Progress: improving  Outcome Evaluation: Alert and oriented x4. x1 complaint of pain, medicated per MAR. Wound/skin care performed per orders. refuses turns. Transferring to SNF this shift. vital signs WNL for patient. blood glucose monitored. No new issues at this time.

## 2023-11-06 NOTE — LETTER
"    EMS Transport Request  For use at [facility], [place], [place], [place], and [place] only   Patient Name: Jose Shaikh : 1960   Weight:(!) 151 kg (333 lb 12.4 oz) Pick-up Location: [facility] BLS/ALS: {BLS/ALS:83048}   Insurance: [employer] MEDICARE REPLACEMENT Auth End Date: ***   Pre-Cert #: D/C Summary complete:    Destination: {Transport Locations:09115}   Contact Precautions: {Precautions:03952}   Equipment (O2, Fluids, etc.): {Equipment:02794}   Arrive By Date/Time: *** Stretcher/WC: {Stretcher/WC:74144}   CM Requesting:  Ext: ***   Notes/Medical Necessity: ***     ______________________________________________________________________    *Only 2 patient bags OR 1 carry-on size bag are permitted.  Wheelchairs and walkers CANNOT transported with the patient. Acknowledge: {Acknowledge:[id number]::\"Yes\"}    "

## 2023-11-07 ENCOUNTER — APPOINTMENT (OUTPATIENT)
Dept: GENERAL RADIOLOGY | Facility: HOSPITAL | Age: 63
DRG: 948 | End: 2023-11-07
Payer: MEDICARE

## 2023-11-07 LAB
GLUCOSE BLDC GLUCOMTR-MCNC: 114 MG/DL (ref 70–99)
GLUCOSE BLDC GLUCOMTR-MCNC: 141 MG/DL (ref 70–99)
GLUCOSE BLDC GLUCOMTR-MCNC: 143 MG/DL (ref 70–99)
GLUCOSE BLDC GLUCOMTR-MCNC: 154 MG/DL (ref 70–99)

## 2023-11-07 PROCEDURE — 82948 REAGENT STRIP/BLOOD GLUCOSE: CPT

## 2023-11-07 PROCEDURE — 99305 1ST NF CARE MODERATE MDM 35: CPT | Performed by: PHYSICIAN ASSISTANT

## 2023-11-07 PROCEDURE — 63710000001 INSULIN DETEMIR PER 5 UNITS: Performed by: INTERNAL MEDICINE

## 2023-11-07 PROCEDURE — 73562 X-RAY EXAM OF KNEE 3: CPT

## 2023-11-07 PROCEDURE — 97530 THERAPEUTIC ACTIVITIES: CPT

## 2023-11-07 PROCEDURE — 97535 SELF CARE MNGMENT TRAINING: CPT

## 2023-11-07 PROCEDURE — 63710000001 INSULIN LISPRO (HUMAN) PER 5 UNITS: Performed by: INTERNAL MEDICINE

## 2023-11-07 PROCEDURE — 97165 OT EVAL LOW COMPLEX 30 MIN: CPT

## 2023-11-07 RX ORDER — PREGABALIN 100 MG/1
100 CAPSULE ORAL 3 TIMES DAILY
Status: DISPENSED | OUTPATIENT
Start: 2023-11-07

## 2023-11-07 RX ORDER — DIPHENHYDRAMINE HYDROCHLORIDE AND LIDOCAINE HYDROCHLORIDE AND ALUMINUM HYDROXIDE AND MAGNESIUM HYDRO
5 KIT 2 TIMES DAILY
Status: DISPENSED | OUTPATIENT
Start: 2023-11-07 | End: 2023-11-10

## 2023-11-07 RX ADMIN — BACLOFEN 10 MG: 10 TABLET ORAL at 08:21

## 2023-11-07 RX ADMIN — PREGABALIN 100 MG: 100 CAPSULE ORAL at 16:32

## 2023-11-07 RX ADMIN — INSULIN DETEMIR 70 UNITS: 100 INJECTION, SOLUTION SUBCUTANEOUS at 08:20

## 2023-11-07 RX ADMIN — DIPHENHYDRAMINE HYDROCHLORIDE AND LIDOCAINE HYDROCHLORIDE AND ALUMINUM HYDROXIDE AND MAGNESIUM HYDRO 5 ML: KIT at 20:07

## 2023-11-07 RX ADMIN — BACLOFEN 10 MG: 10 TABLET ORAL at 17:50

## 2023-11-07 RX ADMIN — EMPAGLIFLOZIN 10 MG: 10 TABLET, FILM COATED ORAL at 08:21

## 2023-11-07 RX ADMIN — FAMOTIDINE 40 MG: 20 TABLET, FILM COATED ORAL at 08:21

## 2023-11-07 RX ADMIN — DIPHENHYDRAMINE HYDROCHLORIDE AND LIDOCAINE HYDROCHLORIDE AND ALUMINUM HYDROXIDE AND MAGNESIUM HYDRO 5 ML: KIT at 14:41

## 2023-11-07 RX ADMIN — BACITRACIN 0.9 G: 500 OINTMENT TOPICAL at 08:22

## 2023-11-07 RX ADMIN — INSULIN LISPRO 5 UNITS: 100 INJECTION, SOLUTION INTRAVENOUS; SUBCUTANEOUS at 17:50

## 2023-11-07 RX ADMIN — ATORVASTATIN CALCIUM 10 MG: 10 TABLET, FILM COATED ORAL at 20:01

## 2023-11-07 RX ADMIN — HYDROCODONE BITARTRATE AND ACETAMINOPHEN 1 TABLET: 7.5; 325 TABLET ORAL at 11:09

## 2023-11-07 RX ADMIN — CHOLESTYRAMINE 1 PACKET: 4 POWDER, FOR SUSPENSION ORAL at 08:22

## 2023-11-07 RX ADMIN — INSULIN LISPRO 4 UNITS: 100 INJECTION, SOLUTION INTRAVENOUS; SUBCUTANEOUS at 20:00

## 2023-11-07 RX ADMIN — APIXABAN 5 MG: 5 TABLET, FILM COATED ORAL at 08:21

## 2023-11-07 RX ADMIN — INSULIN LISPRO 5 UNITS: 100 INJECTION, SOLUTION INTRAVENOUS; SUBCUTANEOUS at 08:21

## 2023-11-07 RX ADMIN — LISINOPRIL 2.5 MG: 2.5 TABLET ORAL at 08:21

## 2023-11-07 RX ADMIN — CYANOCOBALAMIN TAB 500 MCG 1000 MCG: 500 TAB at 08:21

## 2023-11-07 RX ADMIN — Medication 250 MG: at 20:01

## 2023-11-07 RX ADMIN — Medication: at 11:11

## 2023-11-07 RX ADMIN — APIXABAN 5 MG: 5 TABLET, FILM COATED ORAL at 20:01

## 2023-11-07 RX ADMIN — PREGABALIN 100 MG: 100 CAPSULE ORAL at 20:01

## 2023-11-07 RX ADMIN — INSULIN LISPRO 5 UNITS: 100 INJECTION, SOLUTION INTRAVENOUS; SUBCUTANEOUS at 12:53

## 2023-11-07 RX ADMIN — INSULIN DETEMIR 65 UNITS: 100 INJECTION, SOLUTION SUBCUTANEOUS at 20:01

## 2023-11-07 RX ADMIN — Medication 250 MG: at 08:22

## 2023-11-07 RX ADMIN — HYDROCODONE BITARTRATE AND ACETAMINOPHEN 1 TABLET: 7.5; 325 TABLET ORAL at 20:05

## 2023-11-07 RX ADMIN — HYDROCODONE BITARTRATE AND ACETAMINOPHEN 1 TABLET: 7.5; 325 TABLET ORAL at 04:41

## 2023-11-07 RX ADMIN — CHOLESTYRAMINE 1 PACKET: 4 POWDER, FOR SUSPENSION ORAL at 20:01

## 2023-11-07 RX ADMIN — IBUPROFEN 400 MG: 400 TABLET ORAL at 20:05

## 2023-11-07 RX ADMIN — PREGABALIN 75 MG: 75 CAPSULE ORAL at 08:21

## 2023-11-07 NOTE — PLAN OF CARE
Goal Outcome Evaluation:           Progress: no change  Outcome Evaluation: VSS. Morpine for pain control. Dressing changed done on bilateral foot. Blood glucose monitored. No other changes. Will continue to monitor.   Spontaneous, unlabored and symmetrical

## 2023-11-07 NOTE — PLAN OF CARE
Goal Outcome Evaluation:  Plan of Care Reviewed With: patient           Outcome Evaluation: Pt is AO x 4 and VSS. He was a new admit to SNF yesterday. Pt has a bariatric bed with a trapeze ring to help him move. He has not been out of bed all night. He has wound and skin care orders we are following. He continues to have urinary and bowel incontinence. Pt has been medicated for pain x 2. Will continue with his plan of care. Call light and personal items within reach.

## 2023-11-07 NOTE — NURSING NOTE
Called to room by staff. Resident sitting on floor with back leaning against bed. 2 male therapists at each side. PCA also present. Therapist stated that that resident leg gave out and patient was slowly assisted to floor. Attempted to body lift with 2 therapist and 3 staff members. Unable to lift Resident to bed. Security was called to floor and resident was lifted off the floor into a wheelchair. Skin tear noted to rt LE incision line from previous amputation. Dressing applied per protocol. MD notified, woundcare notified for consult. S/T noted to rt arm as well. Dressed per protocol. Resident c/o lt knee pain. MD notified. Awaiting orders

## 2023-11-07 NOTE — PLAN OF CARE
Goal Outcome Evaluation:  Plan of Care Reviewed With: patient        Progress: no change  Outcome Evaluation: Patient presents with limitations affecting strength, activity tolerance, and balance impacting patient's ability to return home safely and independently.  The skills of a therapist will be required to safely and effectively implement the following treatment plan to restore maximal level of function      Anticipated Discharge Disposition (OT): sub acute care setting, extended care facility

## 2023-11-07 NOTE — SIGNIFICANT NOTE
After standing for one minute while attempting to transfer patient from bed to shower chair using rolling walker, gait belt, and assist of 2 people, patient's legs gave out and was lowered to the floor on his bottom with legs in a partial flexed position.  Patient was made comfortable on the floor with back supported against the bed with pillow between.  Security was called to help assist patient into wheelchair.  Nursing was notified of large skin tear on front of left foot.

## 2023-11-07 NOTE — THERAPY EVALUATION
SNF - Occupational Therapy Initial Evaluation   [facility]    Patient Name: Jose Shaikh  : 1960    MRN: [id number]                              Today's Date: 2023       Admit Date: 2023    Visit Dx: No diagnosis found.  Patient Active Problem List   Diagnosis    Diabetic ulcer of left heel associated with type 2 DM    Acute osteomyelitis of left calcaneus     Diabetic ulcer of left heel associated with type 2 DM    Diabetic ulcer of right midfoot associated with type 2 DM    Paroxysmal atrial fibrillation    Essential hypertension    Hyperlipidemia LDL goal <100    Cellulitis and abscess of foot    High alkaline phosphatase level    Osteomyelitis    Onychomycosis    Onychocryptosis    Foot pain, bilateral    Osteomyelitis of foot, right, acute    Cellulitis of right foot    Type 2 diabetes mellitus, with long-term current use of insulin    Class 3 severe obesity due to excess calories with serious comorbidity and body mass index (BMI) of 45.0 to 49.9 in adult    Anxiety disorder, unspecified    Claustrophobia    Dependence on wheelchair    Depression, unspecified    Long term (current) use of anticoagulants    Long term (current) use of oral hypoglycemic drugs    Wound of foot    Non-prs chronic ulcer oth prt r foot limited to brkdwn skin    Orthostatic hypotension    Other chronic osteomyelitis, right ankle and foot    Personal history of nicotine dependence    Thrombocytopenia, unspecified    Unspecified open wound, right foot, initial encounter    Diabetic foot infection    Subacute osteomyelitis of right foot    Right foot pain    Sepsis    Onychomycosis    Foot pain, left    Impaired mobility and ADLs    Absence of toe of right foot    Corns and callosity    Disability of walking    Fracture    Limb swelling    Polyneuropathy    Pressure ulcer, stage 1    Shortness of breath    Generalized weakness    Debility     Past Medical History:   Diagnosis Date    Absence of toe of right foot      Acute osteomyelitis of left calcaneus  08/18/2021    Anxiety and depression     Arthritis     Cancer     Claustrophobia     Corns and callus     Diabetic ulcer of left heel associated with type 2 DM 08/18/2021    Diabetic ulcer of left heel associated with type 2 DM 07/06/2021    Diabetic ulcer of right midfoot associated with type 2 DM 08/18/2021    Difficulty walking     Essential hypertension 08/31/2021    Hammertoe     Hyperlipidemia LDL goal <100 08/31/2021    Ingrown toenail     Obesity     Paroxysmal atrial fibrillation 08/31/2021    Polyneuropathy     Pressure ulcer, stage 1     Tinea unguium     Type 2 diabetes mellitus with polyneuropathy      Past Surgical History:   Procedure Laterality Date    CYST REMOVAL      center of back; benign    EYE SURGERY      INCISION AND DRAINAGE ABSCESS      back    INCISION AND DRAINAGE LEG Right 12/10/2021    Procedure: INCISION AND DRAINAGE LOWER EXTREMITY;  Surgeon: Ash Leyva DPM;  Location: [facility];  Service: Podiatry;  Laterality: Right;    OTHER SURGICAL HISTORY      Surgical clips left foot    TOE SURGERY Right     Removal of 5th toe    TRANS METATARSAL AMPUTATION Right 12/02/2021    Procedure: AMPUTATION TRANS METATARSAL;  Surgeon: Ash Leyva DPM;  Location: [facility] OR;  Service: Podiatry;  Laterality: Right;    VASCULAR SURGERY      WRIST SURGERY Left     repair of injury      General Information       Row Name 11/07/23 0935 11/07/23 0924       OT Time and Intention    Document Type therapy note (daily note)  -PG evaluation  -PG    Mode of Treatment individual therapy;occupational therapy  -PG individual therapy;occupational therapy  -PG      Row Name 11/07/23 0924          General Information    Patient Profile Reviewed yes  Patient lives alone but has not been home since June.  In [facility] rehab from June through early September.  Went home and readmitted back to hospital same day.  Patient reports independent prior to  [place] with ADLs and functional mobility  -PG     Prior Level of Function max assist:;dependent:;ADL's  -PG     Existing Precautions/Restrictions fall  -PG     Barriers to Rehab ineffective coping  -PG       Row Name 11/07/23 0924          Occupational Profile    Reason for Services/Referral (Occupational Profile) Patient is a 63-year-old male admitted to the skilled nursing facility for debility, lower extremity wounds with prolonged hospitalization.  Patient has been receiving therapy services in acute care and now being evaluated due to continued decreased performance with ADLs  -PG       Row Name 11/07/23 0924          Living Environment    People in Home alone  -PG       Row Name 11/07/23 0924          Cognition    Orientation Status (Cognition) oriented x 4  -PG       Row Name 11/07/23 0924          Safety Issues, Functional Mobility    Impairments Affecting Function (Mobility) balance;endurance/activity tolerance;strength;pain  -PG               User Key  (r) = Recorded By, (t) = Taken By, (c) = Cosigned By      Initials Name Provider Type    PG Kodak Matos OT Occupational Therapist                     Mobility/ADL's       Row Name 11/07/23 0935 11/07/23 0927       Bed Mobility    Bed Mobility sit-supine  -PG sit-supine  -PG    All Activities, [employer] (Bed Mobility) moderate assist (50% patient effort)  -PG moderate assist (50% patient effort)  -PG      Row Name 11/07/23 0935 11/07/23 0927       Transfers    Transfers sit-stand transfer;stand-sit transfer  -PG sit-stand transfer;stand-sit transfer  -PG      Row Name 11/07/23 0935 11/07/23 0927       Sit-Stand Transfer    Sit-Stand [employer] (Transfers) minimum assist (75% patient effort);verbal cues;2 person assist  -PG minimum assist (75% patient effort);verbal cues;2 person assist  -PG    Assistive Device (Sit-Stand Transfers) walker, front-wheeled  -PG --      Row Name 11/07/23 0935 11/07/23 0927       Stand-Sit Transfer    Stand-Sit [employer]  (Transfers) verbal cues;2 person assist;minimum assist (75% patient effort)  -PG verbal cues;2 person assist;minimum assist (75% patient effort)  -PG    Assistive Device (Stand-Sit Transfers) walker, front-wheeled  -PG --      Row Name 11/07/23 0927          Activities of Daily Living    BADL Assessment/Intervention bathing;upper body dressing;lower body dressing;grooming;toileting  -PG       Row Name 11/07/23 0935 11/07/23 0927       Bathing Assessment/Intervention    [place] Level (Bathing) bathing skills;moderate assist (50% patient effort)  -PG bathing skills;moderate assist (50% patient effort)  -PG    Position (Bathing) supported sitting  -PG --      Row Name 11/07/23 0935 11/07/23 0927       Upper Body Dressing Assessment/Training    [place] Level (Upper Body Dressing) upper body dressing skills;set up  -PG upper body dressing skills;set up  -PG      Row Name 11/07/23 0935 11/07/23 0927       Lower Body Dressing Assessment/Training    [place] Level (Lower Body Dressing) lower body dressing skills;dependent (less than 25% patient effort)  -PG lower body dressing skills;dependent (less than 25% patient effort)  -PG      Row Name 11/07/23 0935 11/07/23 0927       Grooming Assessment/Training    [place] Level (Grooming) grooming skills;set up  -PG grooming skills;set up  -PG      Row Name 11/07/23 0935 11/07/23 0927       Toileting Assessment/Training    [place] Level (Toileting) toileting skills;dependent (less than 25% patient effort)  -PG toileting skills;dependent (less than 25% patient effort)  -PG              User Key  (r) = Recorded By, (t) = Taken By, (c) = Cosigned By      Initials Name Provider Type    PG Kodak Matos OT Occupational Therapist                   Obj/Interventions       Row Name 11/07/23 0928          Sensory Assessment (Somatosensory)    Sensory Assessment (Somatosensory) sensation intact  -PG       Row Name 11/07/23 0928          Vision  Assessment/Intervention    Visual Impairment/Limitations WFL  -PG       Row Name 11/07/23 0928          Range of Motion Comprehensive    General Range of Motion no range of motion deficits identified  -PG       Row Name 11/07/23 0928          Strength Comprehensive (MMT)    Comment, General Manual Muscle Testing (MMT) Assessment 4-4+/5 bilateral upper extremities  -PG       Row Name 11/07/23 0928          Motor Skills    Motor Skills coordination;functional endurance  -PG     Coordination WFL  -PG     Functional Endurance Fair minus  -PG               User Key  (r) = Recorded By, (t) = Taken By, (c) = Cosigned By      Initials Name Provider Type    PG Kodak Matos OT Occupational Therapist                   Goals/Plan       Row Name 11/07/23 0930          Transfer Goal 1 (OT)    Activity/Assistive Device (Transfer Goal 1, OT) transfers, all  -PG     [place] Level/Cues Needed (Transfer Goal 1, OT) standby assist  -PG     Time Frame (Transfer Goal 1, OT) long term goal (LTG);30 days  -PG       Row Name 11/07/23 0930          Bathing Goal 1 (OT)    Activity/Device (Bathing Goal 1, OT) bathing skills, all  -PG     [place] Level/Cues Needed (Bathing Goal 1, OT) minimum assist (75% or more patient effort)  -PG     Time Frame (Bathing Goal 1, OT) long term goal (LTG);30 days  -PG       Row Name 11/07/23 0930          Dressing Goal 1 (OT)    Activity/Device (Dressing Goal 1, OT) dressing skills, all  -PG     [place]/Cues Needed (Dressing Goal 1, OT) minimum assist (75% or more patient effort)  -PG     Time Frame (Dressing Goal 1, OT) 30 days;long term goal (LTG)  -PG       Row Name 11/07/23 0930          Toileting Goal 1 (OT)    Activity/Device (Toileting Goal 1, OT) toileting skills, all  -PG     [place] Level/Cues Needed (Toileting Goal 1, OT) minimum assist (75% or more patient effort)  -PG     Time Frame (Toileting Goal 1, OT) 30 days;long term goal (LTG)  -PG       Row Name 11/07/23 0930           Grooming Goal 1 (OT)    Activity/Device (Grooming Goal 1, OT) grooming skills, all  -PG     [place] (Grooming Goal 1, OT) modified independence  -PG     Time Frame (Grooming Goal 1, OT) long term goal (LTG);30 days  -PG       Row Name 11/07/23 0930          Strength Goal 1 (OT)    Strength Goal 1 (OT) Patient will improve bilateral upper extremity strength to 5/5 to support independence and engagement with ADL activities  -PG     Time Frame (Strength Goal 1, OT) 10 days;long term goal (LTG)  -PG       Row Name 11/07/23 0930          Problem Specific Goal 1 (OT)    Problem Specific Goal 1 (OT) Patient will demonstrate fair plus endurance/activity tolerance needed to support ADLs.  -PG     Time Frame (Problem Specific Goal 1, OT) long term goal (LTG);10 days  -PG       Row Name 11/07/23 0930          Therapy Assessment/Plan (OT)    Planned Therapy Interventions (OT) activity tolerance training;BADL retraining;strengthening exercise;transfer/mobility retraining;patient/caregiver education/training;occupation/activity based interventions  -PG               User Key  (r) = Recorded By, (t) = Taken By, (c) = Cosigned By      Initials Name Provider Type    PG Kodak Matos, OT Occupational Therapist                   Clinical Impression       Row Name 11/07/23 0929          Pain Assessment    Pain Intervention(s) Nursing Notified;Emotional support  -PG       Row Name 11/07/23 0929          Pain Scale: FACES Pre/Post-Treatment    Pain: FACES Scale, Pretreatment 8-->hurts whole lot  -PG     Posttreatment Pain Rating 8-->hurts whole lot  -PG     Pain Location - Side/Orientation Left  -PG     Pain Location - knee  -PG       Row Name 11/07/23 0929          Plan of Care Review    Plan of Care Reviewed With patient  -PG     Progress no change  -PG     Outcome Evaluation Patient presents with limitations affecting strength, activity tolerance, and balance impacting patient's ability to return home safely and independently.   The skills of a therapist will be required to safely and effectively implement the following treatment plan to restore maximal level of function  -PG       Row Name 11/07/23 0929          Therapy Assessment/Plan (OT)    Patient/Family Therapy Goal Statement (OT) Be able to walk to the bathroom and then go home independently  -PG     Rehab Potential (OT) good, to achieve stated therapy goals  -PG     Criteria for Skilled Therapeutic Interventions Met (OT) yes;meets criteria;skilled treatment is necessary  -PG     Therapy Frequency (OT) 5 times/wk  -PG       Row Name 11/07/23 0929          Therapy Plan Review/Discharge Plan (OT)    Anticipated Discharge Disposition (OT) sub acute care setting;extended care facility  -PG               User Key  (r) = Recorded By, (t) = Taken By, (c) = Cosigned By      Initials Name Provider Type    PG Kodak Matos, BEKAH Occupational Therapist                   Outcome Measures       Row Name 11/07/23 0931          How much help from another is currently needed...    Putting on and taking off regular lower body clothing? 1  -PG     Bathing (including washing, rinsing, and drying) 2  -PG     Toileting (which includes using toilet bed pan or urinal) 1  -PG     Putting on and taking off regular upper body clothing 3  -PG     Taking care of personal grooming (such as brushing teeth) 3  -PG     Eating meals 4  -PG     AM-PAC 6 Clicks Score (OT) 14  -PG       Row Name 11/06/23 2200          How much help from another person do you currently need...    Turning from your back to your side while in flat bed without using bedrails? 4  -BR     Moving from lying on back to sitting on the side of a flat bed without bedrails? 3  -BR     Moving to and from a bed to a chair (including a wheelchair)? 2  -BR     Standing up from a chair using your arms (e.g., wheelchair, bedside chair)? 1  -BR     Climbing 3-5 steps with a railing? 1  -BR     To walk in hospital room? 1  -BR     AM-PAC 6 Clicks Score (PT)  12  -BR     Highest level of mobility 4 --> Transferred to chair/commode  -BR       Row Name 11/07/23 0931          Functional Assessment    Outcome Measure Options AM-PAC 6 Clicks Daily Activity (OT);Optimal Instrument  -PG       Row Name 11/07/23 0931          Optimal Instrument    Optimal Instrument Optimal - 3  -PG     Bending/Stooping 4  -PG     Standing 4  -PG     Reaching 2  -PG               User Key  (r) = Recorded By, (t) = Taken By, (c) = Cosigned By      Initials Name Provider Type    Elly Weiss, RN Registered Nurse    Kodak Velazco, OT Occupational Therapist                  Section G              Section GG  SectionGG: Functional Ability/Goals, Adm  Self Care, Prior Functioning (YH6879H): 1. Dependent  Upper Extremity Range of Motion (VO4619K): No impairment  Self Care, Admission (Section GG)  Eating: Self-Care Admission Performance (SU2911F8): independent (06)  Oral Hygiene: Self-Care Admission Performance (NB1611P0): independent (06)  Toileting Hygiene: Self-Care Admission Performance (EP4088G4): dependent (01)  Shower/Bathe Self: Self-Care Admission Performance (GM6623M2): substantial/maximal assistance (02)  Upper Body Dressing: Self-Care Admission Performance (MZ1147V6): partial/moderate assistance (03)  Lower Body Dressing: Self-Care Admission Performance (QI6272T9): dependent (01)  Putting On/Taking Off Footwear: Self-Care Admission Performance (KU3358C2): dependent (01)  Personal Hygiene: Self-Care Admission Performance (CS4671E1): dependent (01)  Mobility, Admission Performance (HB6746)  Toilet Transfer: Mobility Admission Performance (ZJ6281M7): not attempted, medical condition/safety concern (88)  Section GG: Functional Ability/Goals, DC  Eating: Self-Care Discharge Goal (KB9784U7): independent (06)  Oral Hygiene: Self-Care Discharge Goal (DV6096A0): independent (06)  Shower/Bathe Self: Self-Care Discharge Goal (XT6607Q3): supervision or touching assistance (04)  Upper Body  Dressing: Self-Care Discharge Goal (IK9091S4): setup or clean-up assistance (05)  Lower Body Dressing: Self-Care Discharge Goal (UA8467T2): supervision or touching assistance (04)  Putting On/Taking Off Footwear: Self-Care Discharge Goal (JW1492W3): supervision or touching assistance (04)  Personal Hygiene: Self-Care Discharge Goal (MH3241U9): supervision or touching assistance (04)  Mobility (GG), Discharge Goal (QY4605)  Toilet Transfer: Mobility Discharge Goal (BC6754W9): supervision or touching assistance (04)          Occupational Therapy Education       Title: PT OT SLP Therapies (Done)       Topic: Occupational Therapy (Done)       Point: ADL training (Done)       Description:   Instruct learner(s) on proper safety adaptation and remediation techniques during self care or transfers.   Instruct in proper use of assistive devices.                  Learning Progress Summary             Patient Acceptance, E,D, DU by PG at 11/7/2023 0932                         Point: Home exercise program (Done)       Description:   Instruct learner(s) on appropriate technique for monitoring, assisting and/or progressing therapeutic exercises/activities.                  Learning Progress Summary             Patient Acceptance, E,D, DU by PG at 11/7/2023 0932                         Point: Precautions (Done)       Description:   Instruct learner(s) on prescribed precautions during self-care and functional transfers.                  Learning Progress Summary             Patient Acceptance, E,D, DU by PG at 11/7/2023 0932                         Point: Body mechanics (Done)       Description:   Instruct learner(s) on proper positioning and spine alignment during self-care, functional mobility activities and/or exercises.                  Learning Progress Summary             Patient Acceptance, E,D, DU by PG at 11/7/2023 0932                                         User Key       Initials Effective Dates Name Provider Type  Discipline    PG 06/16/21 -  Kodak Matos OT Occupational Therapist OT                  OT Recommendation and Plan  Planned Therapy Interventions (OT): activity tolerance training, BADL retraining, strengthening exercise, transfer/mobility retraining, patient/caregiver education/training, occupation/activity based interventions  Therapy Frequency (OT): 5 times/wk  Plan of Care Review  Plan of Care Reviewed With: patient  Progress: no change  Outcome Evaluation: Patient presents with limitations affecting strength, activity tolerance, and balance impacting patient's ability to return home safely and independently.  The skills of a therapist will be required to safely and effectively implement the following treatment plan to restore maximal level of function     Time Calculation:   Evaluation Complexity (OT)  Review Occupational Profile/Medical/Therapy History Complexity: brief/low complexity  Assessment, Occupational Performance/Identification of Deficit Complexity: 3-5 performance deficits  Overall Complexity of Evaluation (OT): low complexity     Time Calculation- OT       Row Name 11/07/23 0939             Time Calculation- OT    OT Received On 11/07/23  -PG      OT Goal Re-Cert Due Date 12/07/23  -PG         Timed Charges    20580 - OT Therapeutic Activity Minutes 40  -PG      76949 - OT Self Care/Mgmt Minutes 30  -PG         Untimed Charges    OT Eval/Re-eval Minutes 25  -PG         SNF Occupational Therapy Minutes    Skilled Minutes- OT 70 min  -PG         Total Minutes    Timed Charges Total Minutes 70  -PG      Untimed Charges Total Minutes 25  -PG       Total Minutes 95  -PG                User Key  (r) = Recorded By, (t) = Taken By, (c) = Cosigned By      Initials Name Provider Type    PG Kodak Matos OT Occupational Therapist                  Therapy Charges for Today       Code Description Service Date Service Provider Modifiers Qty    [id number]  OT THERAPEUTIC ACT EA 15 MIN 11/7/2023 Carlo  Kodak OT GO 3    80064550701 HC OT SELF CARE/MGMT/TRAIN EA 15 MIN 11/7/2023 Kodak Matos OT GO 2    35952063102 HC OT EVAL LOW COMPLEXITY 2 11/7/2023 Kodak Matos OT GO 1                 Kodak Matos OT  11/7/2023

## 2023-11-07 NOTE — H&P
[facility]  Date: 2023   Patient Name: Jose Shaikh  : 1960  MRN: [id number]  Primary Care Physician:  Delia Cervantes, VALENTÍN  Date of admission: 2023    Subjective   Subjective   Chief Complaint: Weakness after recent hospitalization      Summary:Jose Shaikh is a 63 y.o. male  initially hospitalized on 9/10/2023, prolonged hospitalization for treatment of management of generalized weakness, deconditioning, with acute issues of diarrhea, C. difficile negative.  Diarrhea improved.  Had small hematoma after ambulating on his foot.  Unfortunately with exhaustive efforts to try to place this gentleman after 39 days of hospitalization, we are unable to find a facility that will accept him.  He has no further acute needs or requirements for inpatient monitoring and management, his labs and vitals are stable, he is tolerating oral intake, and has been refusing turns and repositionings and other interventions with nursing staff despite recommendations.  Patient discharged in hemodynamically stable addition on 10/19/2023 to follow-up with PCP within 1 week.  Unfortunately we cannot solve all of his social issues during this hospitalization due to lack of resources and participation from the patient's perspective despite all our efforts.  Patient has a financial means of making arrangements at home.  Patient appealed his discharge.  Lost his appeal.  LCD: 10/20/23, PFL beginning: 10/21/23 @ 12pm.  Discharge planning continues to work on inpatient placement.    Currently, resting in bed.  Alert and conversational.  On room air.  Experiencing left thigh muscle spasms/cramps intermittently.  This is not new for him.  Earlier, his legs gave out while transferring with a couple therapists assisting him.  He was lowered to the floor.  Some drainage/discharge from right stump.  Patient asking for adjustment of pain medications regarding left leg spasms.  Pertinent History    Past Medical History:    Active Ambulatory Problems     Diagnosis Date Noted    Diabetic ulcer of left heel associated with type 2 DM 07/06/2021    Acute osteomyelitis of left calcaneus  08/18/2021    Diabetic ulcer of left heel associated with type 2 DM 08/18/2021    Diabetic ulcer of right midfoot associated with type 2 DM 08/18/2021    Paroxysmal atrial fibrillation 08/31/2021    Essential hypertension 08/31/2021    Hyperlipidemia LDL goal <100 08/31/2021    Cellulitis and abscess of foot 12/01/2021    High alkaline phosphatase level 12/19/2021    Osteomyelitis 10/01/2022    Onychomycosis 10/02/2022    Onychocryptosis 10/02/2022    Foot pain, bilateral 10/02/2022    Osteomyelitis of foot, right, acute 10/05/2022    Cellulitis of right foot 10/05/2022    Type 2 diabetes mellitus, with long-term current use of insulin 11/08/2022    Class 3 severe obesity due to excess calories with serious comorbidity and body mass index (BMI) of 45.0 to 49.9 in adult 11/08/2022    Anxiety disorder, unspecified 10/07/2022    Claustrophobia 05/02/2022    Dependence on wheelchair 10/27/2022    Depression, unspecified 05/02/2022    Long term (current) use of anticoagulants 05/02/2022    Long term (current) use of oral hypoglycemic drugs 05/02/2022    Wound of foot 05/02/2022    Non-prs chronic ulcer oth prt r foot limited to brkdwn skin 05/02/2022    Orthostatic hypotension 10/07/2022    Other chronic osteomyelitis, right ankle and foot 10/07/2022    Personal history of nicotine dependence 05/02/2022    Thrombocytopenia, unspecified 10/07/2022    Unspecified open wound, right foot, initial encounter 04/03/2023    Diabetic foot infection 04/03/2023    Subacute osteomyelitis of right foot 04/06/2023    Right foot pain 05/12/2023    Sepsis 05/12/2023    Onychomycosis 05/22/2023    Foot pain, left 05/22/2023    Impaired mobility and ADLs 06/16/2023    Absence of toe of right foot 07/11/2023    Corns and callosity 07/11/2023     Disability of walking 2023    Fracture 2023    Limb swelling 2023    Polyneuropathy 2023    Pressure ulcer, stage 1 2023    Shortness of breath 2023    Generalized weakness 09/10/2023     Resolved Ambulatory Problems     Diagnosis Date Noted    Lactic acidosis 2021    Abscess of back 2021     Past Medical History:   Diagnosis Date    Anxiety and depression     Arthritis     Cancer     Corns and callus     Difficulty walking     Hammertoe     Ingrown toenail     Obesity     Tinea unguium     Type 2 diabetes mellitus with polyneuropathy        Past Surgical History:    Past Surgical History:   Procedure Laterality Date    CYST REMOVAL      center of back; benign    EYE SURGERY      INCISION AND DRAINAGE ABSCESS      back    INCISION AND DRAINAGE LEG Right 12/10/2021    Procedure: INCISION AND DRAINAGE LOWER EXTREMITY;  Surgeon: Ash Leyva DPM;  [facility]: [facility];  Service: Podiatry;  Laterality: Right;    OTHER SURGICAL HISTORY      Surgical clips left foot    TOE SURGERY Right     Removal of 5th toe    TRANS METATARSAL AMPUTATION Right 2021    Procedure: AMPUTATION TRANS METATARSAL;  Surgeon: Ash Leyva DPM;  Location: [facility];  Service: Podiatry;  Laterality: Right;    VASCULAR SURGERY      WRIST SURGERY Left     repair of injury       Social History:   Lives in [place]  Quit smoking 30+ years ago  Social History     Socioeconomic History    Marital status:    Tobacco Use    Smoking status: Former     Packs/day: 0.00     Years: 1.00     Additional pack years: 0.00     Total pack years: 0.00     Types: Cigarettes     Quit date: 1991     Years since quittin.2    Smokeless tobacco: Never    Tobacco comments:     quit at age 30   Vaping Use    Vaping Use: Never used   Substance and Sexual Activity    Alcohol use: Yes     Comment: rare    Drug use: Yes     Types: Marijuana     Comment: occasionally, EVERY 3-4  MONTH    Sexual activity: Defer       Family History:     Family History   Problem Relation Age of Onset    Hearing loss Mother     Depression Mother     Arthritis Mother     Heart disease Mother     Heart disease Father     Cancer Father         Unspecified    Cancer Sister     Hearing loss Brother     Heart disease Brother     Diabetes Paternal Grandmother     Learning disabilities Nephew     Drug abuse Nephew     COPD Nephew     Alcohol abuse Nephew        Home Medications (reported)  Current Outpatient Medications   Medication Instructions    acetaminophen (TYLENOL) 650 mg, Oral, Every 8 Hours PRN    apixaban (ELIQUIS) 5 mg, Oral, Every 12 Hours Scheduled    digoxin (LANOXIN) 125 mcg, Oral, Daily Digoxin    empagliflozin (JARDIANCE) 10 mg, Oral, Daily    famotidine (PEPCID) 40 mg, Oral, Daily    HumaLOG KwikPen 100 UNIT/ML solution pen-injector INJECT 30 UNITS UNDER THE SKIN INTO THE APPROPRIATE AREA AS DIRECTED 3 (THREE) TIMES A DAY BEFORE MEALS.    HYDROcodone-acetaminophen (NORCO) 7.5-325 MG per tablet 1 tablet, Oral, Every 4 Hours PRN    Levemir FlexPen 40 Units, Subcutaneous, 2 Times Daily    lisinopril (PRINIVIL,ZESTRIL) 2.5 mg, Oral, Every 24 Hours Scheduled    naloxone (NARCAN) 4 MG/0.1ML nasal spray Call 911. Don't prime. [place] in 1 nostril for overdose. Repeat in 2-3 minutes in other nostril if no or minimal breathing/responsiveness.    pregabalin (LYRICA) 25 mg, Oral, 3 Times Daily    simvastatin (ZOCOR) 20 mg, Oral, Nightly       Allergies:  Allergies   Allergen Reactions    Adhesive Tape Rash       REVIEW OF SYSTEMS:   Weakness    Objective   Objective   Vitals:   Temp:  [97.7 °F (36.5 °C)-98.2 °F (36.8 °C)] 98.2 °F (36.8 °C)  Heart Rate:  [] 110  Resp:  [18-22] 22  BP: (111-152)/(64-81) 152/81  PHYSICAL EXAM   CON: WN. WD. NAD.  Overweight.  Alert and conversational.  NECK:  No stridor.   RESP:  CTA. No wheezes.    CV:  RRR. No murmur noted.  + edema.  Right stump dressing noted.  GI:  Soft,  protuberant, nontender.  PSYCH:  Alert. Oriented.  Calm.  NEURO:  No dysarthria or aphasia. No unilateral weakness or paresthesia.      Result Review    Result Review:  I have personally reviewed the results from the time of this admission to 11/7/2023 08:37 EST and agree with these findings:  []  Laboratory  []  Microbiology  []  Radiology  []  EKG/Telemetry   []  Cardiology/Vascular   []  Pathology  []  Old records  []  Other:    Assessment & Plan   Assessment / Plan   Assessment:    Weakness after recent hospitalization  Generalized weakness  Deconditioning  Type 2 diabetes mellitus (Kami Garcia and PCP)  Essential hypertension  Chronic paroxysmal atrial fibrillation on Eliquis (previously seen Dr. 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  Also sliding scale Humalog   Continue Florastor  Continue as needed Imodium and scheduled Questran  Continue home Eliquis  Adjust pain medications/muscle relaxants as necessary, judiciously          DVT prophylaxis:  Medical DVT prophylaxis orders are present.  CODE STATUS:      Code Status (Patient has no pulse and is not breathing): CPR (Attempt to Resuscitate)  Medical Interventions (Patient has pulse or is breathing): Full Support    Electronically signed by HUSAM Sanchez, 11/07/23, 8:37 AM EST.

## 2023-11-08 LAB
GLUCOSE BLDC GLUCOMTR-MCNC: 118 MG/DL (ref 70–99)
GLUCOSE BLDC GLUCOMTR-MCNC: 129 MG/DL (ref 70–99)
GLUCOSE BLDC GLUCOMTR-MCNC: 155 MG/DL (ref 70–99)
GLUCOSE BLDC GLUCOMTR-MCNC: 177 MG/DL (ref 70–99)
INDURATION: 0 MM (ref 0–10)
Lab: NORMAL
Lab: NORMAL
TB SKIN TEST: NEGATIVE

## 2023-11-08 PROCEDURE — 97110 THERAPEUTIC EXERCISES: CPT

## 2023-11-08 PROCEDURE — 82948 REAGENT STRIP/BLOOD GLUCOSE: CPT

## 2023-11-08 PROCEDURE — 63710000001 INSULIN DETEMIR PER 5 UNITS: Performed by: INTERNAL MEDICINE

## 2023-11-08 PROCEDURE — 97161 PT EVAL LOW COMPLEX 20 MIN: CPT

## 2023-11-08 PROCEDURE — 63710000001 INSULIN LISPRO (HUMAN) PER 5 UNITS: Performed by: INTERNAL MEDICINE

## 2023-11-08 RX ADMIN — INSULIN LISPRO 5 UNITS: 100 INJECTION, SOLUTION INTRAVENOUS; SUBCUTANEOUS at 18:11

## 2023-11-08 RX ADMIN — INSULIN DETEMIR 65 UNITS: 100 INJECTION, SOLUTION SUBCUTANEOUS at 20:09

## 2023-11-08 RX ADMIN — CYANOCOBALAMIN TAB 500 MCG 1000 MCG: 500 TAB at 09:33

## 2023-11-08 RX ADMIN — INSULIN DETEMIR 70 UNITS: 100 INJECTION, SOLUTION SUBCUTANEOUS at 09:33

## 2023-11-08 RX ADMIN — LISINOPRIL 2.5 MG: 2.5 TABLET ORAL at 09:32

## 2023-11-08 RX ADMIN — CHOLESTYRAMINE 1 PACKET: 4 POWDER, FOR SUSPENSION ORAL at 09:33

## 2023-11-08 RX ADMIN — HYDROCORTISONE 1 APPLICATION: 1 OINTMENT TOPICAL at 06:14

## 2023-11-08 RX ADMIN — INSULIN LISPRO 4 UNITS: 100 INJECTION, SOLUTION INTRAVENOUS; SUBCUTANEOUS at 12:26

## 2023-11-08 RX ADMIN — HYDROCODONE BITARTRATE AND ACETAMINOPHEN 1 TABLET: 7.5; 325 TABLET ORAL at 21:45

## 2023-11-08 RX ADMIN — Medication 250 MG: at 20:00

## 2023-11-08 RX ADMIN — INSULIN LISPRO 4 UNITS: 100 INJECTION, SOLUTION INTRAVENOUS; SUBCUTANEOUS at 20:09

## 2023-11-08 RX ADMIN — IBUPROFEN 400 MG: 400 TABLET ORAL at 21:45

## 2023-11-08 RX ADMIN — PREGABALIN 100 MG: 100 CAPSULE ORAL at 16:27

## 2023-11-08 RX ADMIN — CHOLESTYRAMINE 1 PACKET: 4 POWDER, FOR SUSPENSION ORAL at 20:00

## 2023-11-08 RX ADMIN — DIPHENHYDRAMINE HYDROCHLORIDE AND LIDOCAINE HYDROCHLORIDE AND ALUMINUM HYDROXIDE AND MAGNESIUM HYDRO 5 ML: KIT at 20:00

## 2023-11-08 RX ADMIN — APIXABAN 5 MG: 5 TABLET, FILM COATED ORAL at 20:00

## 2023-11-08 RX ADMIN — FAMOTIDINE 40 MG: 20 TABLET, FILM COATED ORAL at 09:40

## 2023-11-08 RX ADMIN — BACLOFEN 10 MG: 10 TABLET ORAL at 19:58

## 2023-11-08 RX ADMIN — INSULIN LISPRO 5 UNITS: 100 INJECTION, SOLUTION INTRAVENOUS; SUBCUTANEOUS at 12:26

## 2023-11-08 RX ADMIN — DIPHENHYDRAMINE HYDROCHLORIDE AND LIDOCAINE HYDROCHLORIDE AND ALUMINUM HYDROXIDE AND MAGNESIUM HYDRO 5 ML: KIT at 09:46

## 2023-11-08 RX ADMIN — ATORVASTATIN CALCIUM 10 MG: 10 TABLET, FILM COATED ORAL at 20:00

## 2023-11-08 RX ADMIN — APIXABAN 5 MG: 5 TABLET, FILM COATED ORAL at 09:33

## 2023-11-08 RX ADMIN — HYDROCODONE BITARTRATE AND ACETAMINOPHEN 1 TABLET: 7.5; 325 TABLET ORAL at 09:46

## 2023-11-08 RX ADMIN — DIPHENOXYLATE HYDROCHLORIDE AND ATROPINE SULFATE 1 TABLET: 2.5; .025 TABLET ORAL at 06:19

## 2023-11-08 RX ADMIN — Medication 250 MG: at 09:33

## 2023-11-08 RX ADMIN — EMPAGLIFLOZIN 10 MG: 10 TABLET, FILM COATED ORAL at 09:33

## 2023-11-08 RX ADMIN — INSULIN LISPRO 5 UNITS: 100 INJECTION, SOLUTION INTRAVENOUS; SUBCUTANEOUS at 09:33

## 2023-11-08 RX ADMIN — PREGABALIN 100 MG: 100 CAPSULE ORAL at 20:00

## 2023-11-08 RX ADMIN — PREGABALIN 100 MG: 100 CAPSULE ORAL at 09:32

## 2023-11-08 RX ADMIN — BACLOFEN 10 MG: 10 TABLET ORAL at 06:19

## 2023-11-08 NOTE — PLAN OF CARE
Goal Outcome Evaluation:  Plan of Care Reviewed With: patient        Progress: no change  Outcome Evaluation: Alert and oriented x4; able to call for assist as needed. Norco and Motrin administered per MAR for complaints of left knee and leg pain. Incontinent of soft stool this shift. Utilizing urinal to void. Encouraged to turn Q 2hrs with assist but has refused at times. Call light within reach; care plan ongoing.

## 2023-11-08 NOTE — CONSULTS
Nutrition Services    Patient Name: Jose Shaikh  YOB: 1960  MRN: [id number]  Admission date: 11/6/2023      CLINICAL NUTRITION ASSESSMENT      Reason for Assessment  Admission assessment SNF     H&P:    Past Medical History:   Diagnosis Date    Absence of toe of right foot     Acute osteomyelitis of left calcaneus  08/18/2021    Anxiety and depression     Arthritis     Cancer     Claustrophobia     Corns and callus     Diabetic ulcer of left heel associated with type 2 DM 08/18/2021    Diabetic ulcer of left heel associated with type 2 DM 07/06/2021    Diabetic ulcer of right midfoot associated with type 2 DM 08/18/2021    Difficulty walking     Essential hypertension 08/31/2021    Hammertoe     Hyperlipidemia LDL goal <100 08/31/2021    Ingrown toenail     Obesity     Paroxysmal atrial fibrillation 08/31/2021    Polyneuropathy     Pressure ulcer, stage 1     Tinea unguium     Type 2 diabetes mellitus with polyneuropathy         Current Problems:   Active Hospital Problems    Diagnosis     **Debility         Nutrition/Diet History         Narrative     63-year-old male recently hospitalized September 10, 2023 through November 6, 2023.  Admitted to skilled nursing facility related to generalized weakness and deconditioning, and wound care.  See past medical history above.    Nursing notes pt with multiple skin issues related to pt refusal to turn and reposition and incontinence.  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    Pt Andrew score = 15.     Last documented bowel movement 0300, 0626, moderate, soft formed and second loose.  Pt has hx of loose stools without C Diff.    Patient has natural dentition.  Reports a cracked tooth on the lower gumline.  Indicates sharp feeling to tongue.    MNT provided by RD related to consistent carbohydrate diet.  Patient with elevated protein requirements due to skin integrity.  Patient agreeable to increasing protein  "in diet.  We will change diet to regular diet, high-protein regular texture, thin liquids.    BMI is indicative of severe obesity for age.  Patient is 195% of ideal body weight 181 pounds.  See weight history below.    Patient feeds self 75 to 100% of consistent carbohydrate diet.    Monitor per protocol.     Anthropometrics        Current Height, Weight Height: 188 cm (74.02\")  Weight: (!) 158 kg (348 lb 5.2 oz)   Current BMI Body mass index is 44.7 kg/m².       Weight Hx  Wt Readings from Last 30 Encounters:   11/06/23 1422 (!) 158 kg (348 lb 5.2 oz)   11/02/23 1155 (!) 158 kg (348 lb 15.8 oz)   09/11/23 0030 (!) 151 kg (334 lb)   09/10/23 1844 (!) 154 kg (338 lb 10 oz)   08/30/23 1112 (!) 157 kg (347 lb)   08/01/23 0932 (!) 158 kg (347 lb 10.7 oz)   07/31/23 2347 (!) 163 kg (358 lb 7.5 oz)   06/16/23 2333 (!) 155 kg (342 lb 2.5 oz)   06/16/23 1053 (!) 155 kg (342 lb 3.2 oz)   06/15/23 0505 (!) 149 kg (328 lb 14.4 oz)   06/14/23 0455 (!) 149 kg (328 lb 4.8 oz)   06/13/23 1703 (!) 149 kg (328 lb 11.2 oz)   06/13/23 0600 (!) 170 kg (374 lb 12.5 oz)   06/12/23 0420 (!) 160 kg (352 lb 11.8 oz)   06/11/23 0447 (!) 150 kg (331 lb 5.6 oz)   06/10/23 0500 (!) 150 kg (330 lb 14.6 oz)   06/09/23 0536 (!) 156 kg (343 lb 7.6 oz)   06/08/23 1826 (!) 156 kg (344 lb 11.2 oz)   06/08/23 0522 (!) 158 kg (348 lb 8.8 oz)   06/07/23 0548 (!) 155 kg (342 lb 9.5 oz)   06/06/23 0515 (!) 155 kg (341 lb 14.9 oz)   06/05/23 0445 (!) 155 kg (341 lb 9.6 oz)   06/05/23 0348 (!) 158 kg (349 lb 3.3 oz)   06/04/23 0555 (!) 157 kg (346 lb 3.2 oz)   06/03/23 0539 (!) 158 kg (349 lb)   06/02/23 0548 (!) 163 kg (359 lb 3.2 oz)   06/01/23 0600 (!) 164 kg (362 lb)   05/31/23 0507 (!) 164 kg (362 lb 4.8 oz)   05/30/23 0635 (!) 164 kg (362 lb 7 oz)   05/29/23 0500 (!) 167 kg (368 lb 2.7 oz)   05/28/23 0600 (!) 167 kg (367 lb 11.6 oz)   05/27/23 0600 (!) 167 kg (369 lb 0.8 oz)   05/26/23 0529 (!) 167 kg (369 lb 3.2 oz)   05/25/23 0600 (!) 168 kg (370 lb " 3.2 oz)   05/24/23 0600 (!) 166 kg (366 lb 9.6 oz)   05/22/23 0529 (!) 169 kg (373 lb 7.4 oz)   05/21/23 0600 (!) 169 kg (371 lb 12.8 oz)   05/20/23 0600 (!) 171 kg (376 lb 5.2 oz)   05/19/23 0300 (!) 168 kg (370 lb 14.4 oz)   05/18/23 1912 (!) 168 kg (371 lb 7.6 oz)   05/18/23 0600 (!) 169 kg (371 lb 11.1 oz)   05/16/23 0700 (!) 171 kg (377 lb 4.8 oz)   05/14/23 0500 (!) 171 kg (377 lb 3.3 oz)   05/12/23 1143 (!) 170 kg (375 lb)   05/06/23 0258 (!) 170 kg (375 lb 8 oz)   04/19/23 0909 (!) 163 kg (359 lb)   04/03/23 1906 (!) 168 kg (370 lb)   03/27/23 0938 (!) 170 kg (373 lb 10.9 oz)   03/17/23 1153 (!) 168 kg (370 lb)   01/27/23 1501 (!) 168 kg (370 lb)   12/22/22 1501 (!) 171 kg (376 lb)   11/08/22 1035 (!) 161 kg (355 lb)   10/01/22 1141 (!) 164 kg (360 lb 10.8 oz)   05/18/22 1311 (!) 155 kg (341 lb)   03/24/22 1432 (!) 155 kg (341 lb)   03/02/22 1412 (!) 155 kg (341 lb)   01/12/22 1317 (!) 155 kg (341 lb)   12/30/21 1431 (!) 155 kg (341 lb)   12/01/21 1144 (!) 155 kg (341 lb 11.4 oz)   12/01/21 0843 (!) 157 kg (346 lb)   11/22/21 0839 (!) 157 kg (346 lb)   11/15/21 1148 (!) 157 kg (346 lb 12.5 oz)   11/09/21 1139 (!) 157 kg (345 lb 7.4 oz)   11/05/21 1130 (!) 159 kg (351 lb 3.1 oz)   11/02/21 1121 (!) 161 kg (356 lb)   10/27/21 1048 (!) 161 kg (356 lb)   10/21/21 1418 (!) 162 kg (356 lb 14.8 oz)            Wt Change Observation Severe lower extremity edema with a weight trending up over the past month.  Decrease in weight 27 pounds (7%) in past 6 months.     Estimated/Assessed Needs       Energy Requirements Estimated nutrient needs calculated using adjusted body weight 242 pounds / 110 kg.   EST Needs (kcal/day) 25 kcals per kilogram = 2750 aracelis       Protein Requirements    EST Daily Needs (g/day) 1.0 to 1.2 g/kg = 110 to 132 g of protein       Fluid Requirements     Estimated Needs (mL/day) 25 mL/kg = 2750 mL (11 to 12 cups)     Labs/Medications         Pertinent Labs Reviewed.   Results from last 7 days   Lab  Units 11/05/23  0541   SODIUM mmol/L 137   POTASSIUM mmol/L 4.1   CHLORIDE mmol/L 105   CO2 mmol/L 25.0   BUN mg/dL 15   CREATININE mg/dL 0.54*   CALCIUM mg/dL 8.6   BILIRUBIN mg/dL 0.6   ALK PHOS U/L 226*   ALT (SGPT) U/L 41   AST (SGOT) U/L 39   GLUCOSE mg/dL 147*     Results from last 7 days   Lab Units 11/05/23  0541   MAGNESIUM mg/dL 2.0   PHOSPHORUS mg/dL 4.0   HEMOGLOBIN g/dL 9.0*   HEMATOCRIT % 30.0*     COVID19   Date Value Ref Range Status   05/12/2023 Not Detected Not Detected - Ref. Range Final     Lab Results   Component Value Date    HGBA1C 6.60 (H) 04/19/2023         Pertinent Medications Reviewed.     Current Nutrition Orders & Evaluation of Intake       Oral Nutrition     Current PO Diet Diet: Diabetic Diets, High Protein Diet; Consistent Carbohydrate; Texture: Regular Texture (IDDSI 7); Fluid Consistency: Thin (IDDSI 0)   Supplement No active supplement orders       Malnutrition Severity Assessment                Nutrition Diagnosis         Nutrition Dx Problem 1 Excessive nutrient intake related to increased nutrient needs due to catabolic disease as evidenced by  Significant wound to right foot       Nutrition Intervention         Regular high-protein diet, regular texture, thin liquids.     Medical Nutrition Therapy/Nutrition Education          Learner     Readiness Patient  Acceptance     Method     Response Explanation and Written Material  Verbalizes understanding     Monitor/Evaluation        Monitor Per protocol, PO intake, Weight, Skin status, POC/GOC       Nutrition Discharge Plan         Pt will require high protein diet to aide in wound healing upon discharge.        Electronically signed by:  Carmel Patino RD  11/08/23 07:22 EST

## 2023-11-08 NOTE — THERAPY TREATMENT NOTE
SNF - Occupational Therapy Treatment Note  KEO Dominguez    Patient Name: Jose Shaikh  : 1960    MRN: [id number]                              Today's Date: 2023       Admit Date: 2023    Visit Dx: No diagnosis found.  Patient Active Problem List   Diagnosis    Diabetic ulcer of left heel associated with type 2 DM    Acute osteomyelitis of left calcaneus     Diabetic ulcer of left heel associated with type 2 DM    Diabetic ulcer of right midfoot associated with type 2 DM    Paroxysmal atrial fibrillation    Essential hypertension    Hyperlipidemia LDL goal <100    Cellulitis and abscess of foot    High alkaline phosphatase level    Osteomyelitis    Onychomycosis    Onychocryptosis    Foot pain, bilateral    Osteomyelitis of foot, right, acute    Cellulitis of right foot    Type 2 diabetes mellitus, with long-term current use of insulin    Class 3 severe obesity due to excess calories with serious comorbidity and body mass index (BMI) of 45.0 to 49.9 in adult    Anxiety disorder, unspecified    Claustrophobia    Dependence on wheelchair    Depression, unspecified    Long term (current) use of anticoagulants    Long term (current) use of oral hypoglycemic drugs    Wound of foot    Non-prs chronic ulcer oth prt r foot limited to brkdwn skin    Orthostatic hypotension    Other chronic osteomyelitis, right ankle and foot    Personal history of nicotine dependence    Thrombocytopenia, unspecified    Unspecified open wound, right foot, initial encounter    Diabetic foot infection    Subacute osteomyelitis of right foot    Right foot pain    Sepsis    Onychomycosis    Foot pain, left    Impaired mobility and ADLs    Absence of toe of right foot    Corns and callosity    Disability of walking    Fracture    Limb swelling    Polyneuropathy    Pressure ulcer, stage 1    Shortness of breath    Generalized weakness    Debility     Past Medical History:   Diagnosis Date    Absence of toe of right foot     Acute  osteomyelitis of left calcaneus  08/18/2021    Anxiety and depression     Arthritis     Cancer     Claustrophobia     Corns and callus     Diabetic ulcer of left heel associated with type 2 DM 08/18/2021    Diabetic ulcer of left heel associated with type 2 DM 07/06/2021    Diabetic ulcer of right midfoot associated with type 2 DM 08/18/2021    Difficulty walking     Essential hypertension 08/31/2021    Hammertoe     Hyperlipidemia LDL goal <100 08/31/2021    Ingrown toenail     Obesity     Paroxysmal atrial fibrillation 08/31/2021    Polyneuropathy     Pressure ulcer, stage 1     Tinea unguium     Type 2 diabetes mellitus with polyneuropathy      Past Surgical History:   Procedure Laterality Date    CYST REMOVAL      center of back; benign    EYE SURGERY      INCISION AND DRAINAGE ABSCESS      back    INCISION AND DRAINAGE LEG Right 12/10/2021    Procedure: INCISION AND DRAINAGE LOWER EXTREMITY;  Surgeon: Ash Leyva DPM;  Location: [facility];  Service: Podiatry;  Laterality: Right;    OTHER SURGICAL HISTORY      Surgical clips left foot    TOE SURGERY Right     Removal of 5th toe    TRANS METATARSAL AMPUTATION Right 12/02/2021    Procedure: AMPUTATION TRANS METATARSAL;  Surgeon: Ash Leyva DPM;  Location: [facility] OR;  Service: Podiatry;  Laterality: Right;    VASCULAR SURGERY      WRIST SURGERY Left     repair of injury      General Information       Row Name 11/08/23 1301          OT Time and Intention    Document Type therapy note (daily note)  -PG     Mode of Treatment individual therapy;occupational therapy  -PG               User Key  (r) = Recorded By, (t) = Taken By, (c) = Cosigned By      Initials Name Provider Type    PG Kodak Matos OT Occupational Therapist                     Mobility/ADL's    No documentation.                  Obj/Interventions       Row Name 11/08/23 1301          Shoulder (Therapeutic Exercise)    Shoulder (Therapeutic Exercise) strengthening  exercise  -PG     Shoulder Strengthening (Therapeutic Exercise) 4 lb free weight;resistance band;green;15 repititions;2 sets  -PG       Row Name 11/08/23 1301          Elbow/Forearm (Therapeutic Exercise)    Elbow/Forearm (Therapeutic Exercise) strengthening exercise  -PG     Elbow/Forearm Strengthening (Therapeutic Exercise) resistance band;15 repititions;4 lb free weight  -PG       Row Name 11/08/23 1301          Motor Skills    Therapeutic Exercise shoulder;elbow/forearm  -PG               User Key  (r) = Recorded By, (t) = Taken By, (c) = Cosigned By      Initials Name Provider Type    Kodak Velazco OT Occupational Therapist                   Goals/Plan    No documentation.                  Clinical Impression       Row Name 11/08/23 1302          Plan of Care Review    Progress no change  -PG               User Key  (r) = Recorded By, (t) = Taken By, (c) = Cosigned By      Initials Name Provider Type    Kodak Velazco OT Occupational Therapist                   Outcome Measures       Row Name 11/08/23 1302          How much help from another is currently needed...    Putting on and taking off regular lower body clothing? 1  -PG     Bathing (including washing, rinsing, and drying) 2  -PG     Toileting (which includes using toilet bed pan or urinal) 1  -PG     Putting on and taking off regular upper body clothing 3  -PG     Taking care of personal grooming (such as brushing teeth) 4  -PG     Eating meals 4  -PG     AM-PAC 6 Clicks Score (OT) 15  -PG       Row Name 11/08/23 1302          Functional Assessment    Outcome Measure Options AM-PAC 6 Clicks Daily Activity (OT);Optimal Instrument  -PG       Row Name 11/08/23 1302          Optimal Instrument    Optimal Instrument Optimal - 3  -PG     Bending/Stooping 5  -PG     Standing 5  -PG     Reaching 2  -PG               User Key  (r) = Recorded By, (t) = Taken By, (c) = Cosigned By      Initials Name Provider Type    Kodak Velazco OT Occupational  Therapist                  Section G              Section GG  SectionGG: Functional Ability/Goals, Adm  Self Care, Prior Functioning (YZ8946C): 1. Dependent  Upper Extremity Range of Motion (QB1941Q): No impairment  Self Care, Admission (Section GG)  Eating: Self-Care Admission Performance (EQ9702K1): independent (06)  Oral Hygiene: Self-Care Admission Performance (DF7311R8): independent (06)  Toileting Hygiene: Self-Care Admission Performance (XW9858H5): dependent (01)  Shower/Bathe Self: Self-Care Admission Performance (ER4637C3): substantial/maximal assistance (02)  Upper Body Dressing: Self-Care Admission Performance (CX9485F8): partial/moderate assistance (03)  Lower Body Dressing: Self-Care Admission Performance (UG9931J8): dependent (01)  Putting On/Taking Off Footwear: Self-Care Admission Performance (OI9546B9): dependent (01)  Personal Hygiene: Self-Care Admission Performance (NC7629T4): dependent (01)  Mobility, Admission Performance (AE9225)  Toilet Transfer: Mobility Admission Performance (TL5762O2): not attempted, medical condition/safety concern (88)  Section GG: Functional Ability/Goals, DC  Eating: Self-Care Discharge Goal (IC4378F3): independent (06)  Oral Hygiene: Self-Care Discharge Goal (IO0997X4): independent (06)  Shower/Bathe Self: Self-Care Discharge Goal (QG0080B4): supervision or touching assistance (04)  Upper Body Dressing: Self-Care Discharge Goal (HK8124I5): setup or clean-up assistance (05)  Lower Body Dressing: Self-Care Discharge Goal (IA8761R1): supervision or touching assistance (04)  Putting On/Taking Off Footwear: Self-Care Discharge Goal (SI1827Z1): supervision or touching assistance (04)  Personal Hygiene: Self-Care Discharge Goal (UW9050I1): supervision or touching assistance (04)  Mobility (GG), Discharge Goal (RY2458)  Toilet Transfer: Mobility Discharge Goal (KE4117O3): supervision or touching assistance (04)          Occupational Therapy Education       Title: PT OT SLP  Therapies (Done)       Topic: Occupational Therapy (Done)       Point: ADL training (Done)       Description:   Instruct learner(s) on proper safety adaptation and remediation techniques during self care or transfers.   Instruct in proper use of assistive devices.                  Learning Progress Summary             Patient Acceptance, E,D, DU by PG at 11/7/2023 0932                         Point: Home exercise program (Done)       Description:   Instruct learner(s) on appropriate technique for monitoring, assisting and/or progressing therapeutic exercises/activities.                  Learning Progress Summary             Patient Acceptance, E,D, DU by PG at 11/7/2023 0932                         Point: Precautions (Done)       Description:   Instruct learner(s) on prescribed precautions during self-care and functional transfers.                  Learning Progress Summary             Patient Acceptance, E,D, DU by PG at 11/7/2023 0932                         Point: Body mechanics (Done)       Description:   Instruct learner(s) on proper positioning and spine alignment during self-care, functional mobility activities and/or exercises.                  Learning Progress Summary             Patient Acceptance, E,D, DU by PG at 11/7/2023 0932                                         User Key       Initials Effective Dates Name Provider Type Discipline    PG 06/16/21 -  Kodak Matos, BEKAH Occupational Therapist OT                  OT Recommendation and Plan  Planned Therapy Interventions (OT): activity tolerance training, BADL retraining, strengthening exercise, transfer/mobility retraining, patient/caregiver education/training, occupation/activity based interventions  Therapy Frequency (OT): 5 times/wk  Plan of Care Review  Plan of Care Reviewed With: patient  Progress: no change  Outcome Evaluation: Patient presents with limitations affecting strength, activity tolerance, and balance impacting patient's ability to  return home safely and independently.  The skills of a therapist will be required to safely and effectively implement the following treatment plan to restore maximal level of function     Time Calculation:   Evaluation Complexity (OT)  Review Occupational Profile/Medical/Therapy History Complexity: brief/low complexity  Assessment, Occupational Performance/Identification of Deficit Complexity: 3-5 performance deficits  Overall Complexity of Evaluation (OT): low complexity     Time Calculation- OT       Row Name 11/08/23 1303             Time Calculation- OT    OT Received On 11/08/23  -PG      OT Goal Re-Cert Due Date 12/07/23  -PG         Timed Charges    36886 - OT Therapeutic Exercise Minutes 30  -PG         SNF Occupational Therapy Minutes    Skilled Minutes- OT 30 min  -PG         Total Minutes    Timed Charges Total Minutes 30  -PG       Total Minutes 30  -PG                User Key  (r) = Recorded By, (t) = Taken By, (c) = Cosigned By      Initials Name Provider Type    PG Kodak Matos OT Occupational Therapist                  Therapy Charges for Today       Code Description Service Date Service Provider Modifiers Qty    14855420554 HC OT THERAPEUTIC ACT EA 15 MIN 11/7/2023 Kodak Matos OT GO 3    17368031113 HC OT SELF CARE/MGMT/TRAIN EA 15 MIN 11/7/2023 Kodak Matos OT GO 2    06455795445 HC OT EVAL LOW COMPLEXITY 2 11/7/2023 Kodak Matos OT GO 1    74435037916 HC OT THER PROC EA 15 MIN 11/8/2023 Kodak Matos OT GO 2                 Kodak Matos OT  11/8/2023

## 2023-11-08 NOTE — SIGNIFICANT NOTE
Wound Eval / Progress Noted     Angelica     Patient Name: Jose Shaikh  : 1960  MRN: [id number]  Today's Date: 2023                 Admit Date: 2023    Visit Dx:  No diagnosis found.      Debility        Past Medical History:   Diagnosis Date    Absence of toe of right foot     Acute osteomyelitis of left calcaneus  2021    Anxiety and depression     Arthritis     Cancer     Claustrophobia     Corns and callus     Diabetic ulcer of left heel associated with type 2 DM 2021    Diabetic ulcer of left heel associated with type 2 DM 2021    Diabetic ulcer of right midfoot associated with type 2 DM 2021    Difficulty walking     Essential hypertension 2021    Hammertoe     Hyperlipidemia LDL goal <100 2021    Ingrown toenail     Obesity     Paroxysmal atrial fibrillation 2021    Polyneuropathy     Pressure ulcer, stage 1     Tinea unguium     Type 2 diabetes mellitus with polyneuropathy         Past Surgical History:   Procedure Laterality Date    CYST REMOVAL      center of back; benign    EYE SURGERY      INCISION AND DRAINAGE ABSCESS      back    INCISION AND DRAINAGE LEG Right 12/10/2021    Procedure: INCISION AND DRAINAGE LOWER EXTREMITY;  Surgeon: Ash Leyva DPM;  Location: [facility];  Service: Podiatry;  Laterality: Right;    OTHER SURGICAL HISTORY      Surgical clips left foot    TOE SURGERY Right     Removal of 5th toe    TRANS METATARSAL AMPUTATION Right 2021    Procedure: AMPUTATION TRANS METATARSAL;  Surgeon: Ash Leyva DPM;  Location: [facility];  Service: Podiatry;  Laterality: Right;    VASCULAR SURGERY      WRIST SURGERY Left     repair of injury         Physical Assessment:  Wound 23 1240 Right anterior foot (Active)   Dressing Appearance dry;intact 23   Closure None 23   Base moist;red 23   Periwound intact 23   Periwound Temperature warm 23    Periwound Skin Turgor soft 11/08/23 0918   Edges open 11/08/23 0918   Drainage Characteristics/Odor serosanguineous 11/08/23 0918   Drainage Amount small 11/08/23 0918   Care, Wound cleansed with;sterile normal saline 11/08/23 0918   Dressing Care dressing applied;petroleum-based;non-adherent;gauze;gauze, dry 11/08/23 0918   Periwound Care absorptive dressing applied 11/08/23 0918         Wound Check / Follow-up: Patient seen today for wound consult. Patient with large skin tear to right distal foot after he suffered a fall yesterday. Moist red wound base present with partial skin flap remaining, unable to be re-approximated. Cleansed with normal saline and gauze. Recommending to follow skin tear protocol with every three day dressing changes.      Impression: Skin tear to right distal foot.      Short term goals: Regain skin integrity, skin protection, skin tear protocol.       Dottie Guevara RN    11/8/2023    12:53 EST

## 2023-11-08 NOTE — PLAN OF CARE
Goal Outcome Evaluation:  Pt had multiple BM today. Was not able to stand up with therapy. One dose of PRN pain medicine given.

## 2023-11-08 NOTE — PLAN OF CARE
Goal Outcome Evaluation:  Plan of Care Reviewed With: patient           Outcome Evaluation: Patient presents with decreased strength, transfers and ambulation.  Skilled physical therapy services will be required to address these mobility deficits.      Anticipated Discharge Disposition (PT): home with home health

## 2023-11-08 NOTE — THERAPY EVALUATION
SNF - Physical Therapy Initial Evaluation   Angelica     Patient Name: Jose Shaikh  : 1960  MRN: [id number]  Today's Date: 2023     Admit date: 2023     Referring Physician: Jose Maya MD     Surgery Date:* No surgery found *             Visit Dx:     ICD-10-CM ICD-9-CM   1. Difficulty in walking  R26.2 719.7     Patient Active Problem List   Diagnosis    Diabetic ulcer of left heel associated with type 2 DM    Acute osteomyelitis of left calcaneus     Diabetic ulcer of left heel associated with type 2 DM    Diabetic ulcer of right midfoot associated with type 2 DM    Paroxysmal atrial fibrillation    Essential hypertension    Hyperlipidemia LDL goal <100    Cellulitis and abscess of foot    High alkaline phosphatase level    Osteomyelitis    Onychomycosis    Onychocryptosis    Foot pain, bilateral    Osteomyelitis of foot, right, acute    Cellulitis of right foot    Type 2 diabetes mellitus, with long-term current use of insulin    Class 3 severe obesity due to excess calories with serious comorbidity and body mass index (BMI) of 45.0 to 49.9 in adult    Anxiety disorder, unspecified    Claustrophobia    Dependence on wheelchair    Depression, unspecified    Long term (current) use of anticoagulants    Long term (current) use of oral hypoglycemic drugs    Wound of foot    Non-prs chronic ulcer oth prt r foot limited to brkdwn skin    Orthostatic hypotension    Other chronic osteomyelitis, right ankle and foot    Personal history of nicotine dependence    Thrombocytopenia, unspecified    Unspecified open wound, right foot, initial encounter    Diabetic foot infection    Subacute osteomyelitis of right foot    Right foot pain    Sepsis    Onychomycosis    Foot pain, left    Impaired mobility and ADLs    Absence of toe of right foot    Corns and callosity    Disability of walking    Fracture    Limb swelling    Polyneuropathy    Pressure ulcer, stage 1    Shortness of breath    Generalized  weakness    Debility     Past Medical History:   Diagnosis Date    Absence of toe of right foot     Acute osteomyelitis of left calcaneus  08/18/2021    Anxiety and depression     Arthritis     Cancer     Claustrophobia     Corns and callus     Diabetic ulcer of left heel associated with type 2 DM 08/18/2021    Diabetic ulcer of left heel associated with type 2 DM 07/06/2021    Diabetic ulcer of right midfoot associated with type 2 DM 08/18/2021    Difficulty walking     Essential hypertension 08/31/2021    Hammertoe     Hyperlipidemia LDL goal <100 08/31/2021    Ingrown toenail     Obesity     Paroxysmal atrial fibrillation 08/31/2021    Polyneuropathy     Pressure ulcer, stage 1     Tinea unguium     Type 2 diabetes mellitus with polyneuropathy      Past Surgical History:   Procedure Laterality Date    CYST REMOVAL      center of back; benign    EYE SURGERY      INCISION AND DRAINAGE ABSCESS      back    INCISION AND DRAINAGE LEG Right 12/10/2021    Procedure: INCISION AND DRAINAGE LOWER EXTREMITY;  Surgeon: Ash Leyva DPM;  Location: [facility];  Service: Podiatry;  Laterality: Right;    OTHER SURGICAL HISTORY      Surgical clips left foot    TOE SURGERY Right     Removal of 5th toe    TRANS METATARSAL AMPUTATION Right 12/02/2021    Procedure: AMPUTATION TRANS METATARSAL;  Surgeon: Ash Leyva DPM;  Location: [facility] OR;  Service: Podiatry;  Laterality: Right;    VASCULAR SURGERY      WRIST SURGERY Left     repair of injury     PT Assessment (last 12 hours)       PT Evaluation and Treatment       Row Name 11/08/23 1302          Physical Therapy Time and Intention    Subjective Information no complaints  -MOE     Document Type evaluation  -MOE     Mode of Treatment individual therapy;physical therapy  -MOE     Patient Effort fair  -MOE       Row Name 11/08/23 1302          General Information    Patient Observations alert;cooperative;agree to therapy  -MOE     Prior Level of Function  independent:;all household mobility  -MOE     Equipment Currently Used at Home wheelchair;walker, rolling  -MOE     Existing Precautions/Restrictions fall  -MOE     Barriers to Rehab none identified  -MOE       Row Name 11/08/23 1302          Living Environment    Current Living Arrangements home  -MOE     People in Home alone  -MOE       Row Name 11/08/23 1302          Range of Motion (ROM)    Range of Motion ROM is WFL  -MOE       Row Name 11/08/23 1302          Strength (Manual Muscle Testing)    Strength (Manual Muscle Testing) bilateral lower extremities  2+/5 throughout  -MOE       Row Name 11/08/23 1302          Bed Mobility    Bed Mobility bed mobility (all) activities;supine-sit  -MOE     All Activities, [place] (Bed Mobility) moderate assist (50% patient effort)  -MOE     Supine-Sit [place] (Bed Mobility) moderate assist (50% patient effort)  -MOE       Row Name 11/08/23 1302          Transfers    Transfers sit-stand transfer  -MOE       Row Name 11/08/23 1302          Sit-Stand Transfer    Sit-Stand [place] (Transfers) maximum assist (25% patient effort);2 person assist  -MOE       Row Name 11/08/23 1302          Gait/Stairs (Locomotion)    Comment, (Gait/Stairs) Pt unable to step forward for ambualtion due to weakness  -MOE       Row Name 11/08/23 1302          Balance    Static Standing Balance maximum assist;2-person assist  -MOE     Position/Device Used, Standing Balance walker, front-wheeled  -MOE       Row Name             Wound 12/02/21 Right anterior foot Incision    Wound - Properties Group Placement Date: 12/02/21  -ES Side: Right  -ES Orientation: anterior  -ES Location: foot  -ES Primary Wound Type: Incision  -ES    Retired Wound - Properties Group Placement Date: 12/02/21  -ES Side: Right  -ES Orientation: anterior  -ES Location: foot  -ES Primary Wound Type: Incision  -ES    Retired Wound - Properties Group Date first assessed: 12/02/21  -ES Side: Right  -ES Location: foot  -ES Primary Wound  Type: Incision  -ES      Row Name             Wound 11/01/23 1312 Left anterior second toe Blisters    Wound - Properties Group Placement Date: 11/01/23  -RASHARD Placement Time: 1312  -RASHARD Present on Original Admission: N  -RASHARD Side: Left  -RASHARD Orientation: anterior  -RASHARD Location: second toe  -RASHARD Primary Wound Type: Blisters  -RASHARD    Retired Wound - Properties Group Placement Date: 11/01/23  -RASHARD Placement Time: 1312  -RASHARD Present on Original Admission: N  -RASHARD Side: Left  -RASHARD Orientation: anterior  -RASHARD Location: second toe  -RASHARD Primary Wound Type: Blisters  -RASHARD    Retired Wound - Properties Group Date first assessed: 11/01/23  -RASHARD Time first assessed: 1312  -RASHARD Present on Original Admission: N  -RASHARD Side: Left  -RASHARD Location: second toe  -RASHARD Primary Wound Type: Blisters  -RASHARD      Row Name             Wound 11/06/23 1611 Left posterior heel Other (comment)    Wound - Properties Group Placement Date: 11/06/23  - Placement Time: 1611  -FH Side: Left  -FH Orientation: posterior  -FH Location: heel  -FH Primary Wound Type: Other  -FH, redness     Retired Wound - Properties Group Placement Date: 11/06/23  -FH Placement Time: 1611  -FH Side: Left  -FH Orientation: posterior  -FH Location: heel  -FH Primary Wound Type: Other  -FH, redness     Retired Wound - Properties Group Date first assessed: 11/06/23  - Time first assessed: 1611  -FH Side: Left  -FH Location: heel  -FH Primary Wound Type: Other  -FH, redness       Row Name             Wound 10/17/23 0546 Bilateral perirectal MASD (Moisture associated skin damage)    Wound - Properties Group Placement Date: 10/17/23  -AS Placement Time: 0546  -AS Present on Original Admission: N  -AS Side: Bilateral  -AS Location: perirectal  -AS Primary Wound Type: MASD  -AS    Retired Wound - Properties Group Placement Date: 10/17/23  -AS Placement Time: 0546  -AS Present on Original Admission: N  -AS Side: Bilateral  -AS Location: perirectal  -AS Primary Wound Type: MASD  -AS    Retired Wound  - Properties Group Date first assessed: 10/17/23  -AS Time first assessed: 0546  -AS Present on Original Admission: N  -AS Side: Bilateral  -AS Location: perirectal  -AS Primary Wound Type: MASD  -AS      Row Name             Wound 11/07/23 1240 Right anterior foot    Wound - Properties Group Placement Date: 11/07/23  -FH Placement Time: 1240  -FH Side: Right  -FH Orientation: anterior  -FH Location: foot  -FH    Retired Wound - Properties Group Placement Date: 11/07/23  -FH Placement Time: 1240  -FH Side: Right  -FH Orientation: anterior  -FH Location: foot  -FH    Retired Wound - Properties Group Date first assessed: 11/07/23  -FH Time first assessed: 1240  -FH Side: Right  -FH Location: foot  -FH      Row Name             Wound 11/07/23 1238 Left lower arm    Wound - Properties Group Placement Date: 11/07/23  - Placement Time: 1238  -FH Side: Left  -FH Orientation: lower  -FH Location: arm  -FH    Retired Wound - Properties Group Placement Date: 11/07/23  - Placement Time: 1238  -FH Side: Left  -FH Orientation: lower  -FH Location: arm  -FH    Retired Wound - Properties Group Date first assessed: 11/07/23  - Time first assessed: 1238  -FH Side: Left  -FH Location: arm  -FH      Row Name 11/08/23 1302          Plan of Care Review    Plan of Care Reviewed With patient  -MOE     Outcome Evaluation Patient presents with decreased strength, transfers and ambulation.  Skilled physical therapy services will be required to address these mobility deficits.  -MOE       Row Name 11/08/23 1305          Therapy Assessment/Plan (PT)    Rehab Potential (PT) fair, will monitor progress closely  -MOE     Criteria for Skilled Interventions Met (PT) skilled treatment is necessary  -MOE     Therapy Frequency (PT) 6 times/wk  -MOE     Predicted Duration of Therapy Intervention (PT) 10 days  -MOE     Problem List (PT) problems related to;balance;mobility;strength;pain  -MOE     Activity Limitations Related to Problem List (PT) unable  to ambulate safely;unable to transfer safely  -MOE       Row Name 11/08/23 1302          PT Evaluation Complexity    History, PT Evaluation Complexity no personal factors and/or comorbidities  -MOE     Examination of Body Systems (PT Eval Complexity) total of 4 or more elements  -MOE     Clinical Presentation (PT Evaluation Complexity) stable  -MOE     Clinical Decision Making (PT Evaluation Complexity) low complexity  -MOE     Overall Complexity (PT Evaluation Complexity) low complexity  -MOE       Row Name 11/08/23 1302          Therapy Plan Review/Discharge Plan (PT)    Therapy Plan Review (PT) evaluation/treatment results reviewed;participants voiced agreement with care plan;participants included;patient  -MOE       Row Name 11/08/23 1302          Physical Therapy Goals    Transfer Goal Selection (PT) transfer, PT goal 1  -MOE     Gait Training Goal Selection (PT) gait training, PT goal 1  -MOE       Row Name 11/08/23 1302          Transfer Goal 1 (PT)    Activity/Assistive Device (Transfer Goal 1, PT) transfers, all  -MOE     [place] Level/Cues Needed (Transfer Goal 1, PT) independent  -MOE     Time Frame (Transfer Goal 1, PT) long term goal (LTG);30 days  -MOE       Row Name 11/08/23 1302          Gait Training Goal 1 (PT)    Activity/Assistive Device (Gait Training Goal 1, PT) gait (walking locomotion);assistive device use;walker, rolling  -MOE     [place] Level (Gait Training Goal 1, PT) independent  -MOE     Distance (Gait Training Goal 1, PT) 10  -MOE     Time Frame (Gait Training Goal 1, PT) long term goal (LTG);30 days  -MOE               User Key  (r) = Recorded By, (t) = Taken By, (c) = Cosigned By      Initials Name Provider Type    Karon Jordan RN Registered Nurse    Carlos Anguiano RN Registered Nurse    MOEMerlin Muñiz, PT Physical Therapist    AS Angelina Alfredo RN Registered Nurse    Katlyn Garcia RN Registered Nurse                    Physical Therapy Education        Title: PT OT SLP Therapies (Done)       Topic: Physical Therapy (Done)       Point: Mobility training (Done)       Learning Progress Summary             Patient Acceptance, E,TB, VU by MOE at 11/8/2023 1341                         Point: Precautions (Done)       Learning Progress Summary             Patient Acceptance, E,TB, VU by MOE at 11/8/2023 1341                                         User Key       Initials Effective Dates Name Provider Type Discipline    MOE 06/03/21 -  Merlin Rao PT Physical Therapist PT                  PT Recommendation and Plan  Anticipated Discharge Disposition (PT): home with home health  Planned Therapy Interventions (PT): balance training, gait training, bed mobility training, home exercise program, stair training, strengthening, transfer training  Therapy Frequency (PT): 6 times/wk  Plan of Care Reviewed With: patient  Outcome Evaluation: Patient presents with decreased strength, transfers and ambulation.  Skilled physical therapy services will be required to address these mobility deficits.   Outcome Measures       Row Name 11/08/23 1341             How much help from another person do you currently need...    Turning from your back to your side while in flat bed without using bedrails? 2  -MOE      Moving from lying on back to sitting on the side of a flat bed without bedrails? 2  -MOE      Moving to and from a bed to a chair (including a wheelchair)? 2  -MOE      Standing up from a chair using your arms (e.g., wheelchair, bedside chair)? 1  -MOE      Climbing 3-5 steps with a railing? 1  -MOE      To walk in hospital room? 1  -MOE      AM-PAC 6 Clicks Score (PT) 9  -MOE      Highest level of mobility 3 --> Sat at edge of bed  -MOE         Functional Assessment    Outcome Measure Options AM-PAC 6 Clicks Basic Mobility (PT)  -MOE                User Key  (r) = Recorded By, (t) = Taken By, (c) = Cosigned By      Initials Name Provider Type    Merlin De La O PT Physical Therapist                      Time Calculation:    PT Charges       Row Name 11/08/23 1336 11/08/23 1335          Time Calculation    PT Received On -- 11/08/23  -MOE     PT Goal Re-Cert Due Date 12/07/23  -MOE 11/17/23  -MOE        Untimed Charges    PT Eval/Re-eval Minutes -- 35  -MOE        Total Minutes    Untimed Charges Total Minutes -- 35  -MOE      Total Minutes -- 35  -MOE               User Key  (r) = Recorded By, (t) = Taken By, (c) = Cosigned By      Initials Name Provider Type    Merlin De La O, PT Physical Therapist                  Therapy Charges for Today       Code Description Service Date Service Provider Modifiers Qty    [id number] HC PT EVAL LOW COMPLEXITY 3 11/8/2023 Merlin Rao, PT GP 1            PT G-Codes  Outcome Measure Options: AM-PAC 6 Clicks Basic Mobility (PT)  AM-PAC 6 Clicks Score (PT): 9  AM-PAC 6 Clicks Score (OT): 15    Merlin Rao PT  11/8/2023

## 2023-11-09 LAB
GLUCOSE BLDC GLUCOMTR-MCNC: 115 MG/DL (ref 70–99)
GLUCOSE BLDC GLUCOMTR-MCNC: 125 MG/DL (ref 70–99)
GLUCOSE BLDC GLUCOMTR-MCNC: 130 MG/DL (ref 70–99)
GLUCOSE BLDC GLUCOMTR-MCNC: 246 MG/DL (ref 70–99)

## 2023-11-09 PROCEDURE — 97530 THERAPEUTIC ACTIVITIES: CPT

## 2023-11-09 PROCEDURE — 97110 THERAPEUTIC EXERCISES: CPT

## 2023-11-09 PROCEDURE — 63710000001 INSULIN LISPRO (HUMAN) PER 5 UNITS: Performed by: INTERNAL MEDICINE

## 2023-11-09 PROCEDURE — 97535 SELF CARE MNGMENT TRAINING: CPT

## 2023-11-09 PROCEDURE — 82948 REAGENT STRIP/BLOOD GLUCOSE: CPT

## 2023-11-09 PROCEDURE — 63710000001 INSULIN DETEMIR PER 5 UNITS: Performed by: INTERNAL MEDICINE

## 2023-11-09 RX ADMIN — PREGABALIN 100 MG: 100 CAPSULE ORAL at 09:01

## 2023-11-09 RX ADMIN — PREGABALIN 100 MG: 100 CAPSULE ORAL at 17:19

## 2023-11-09 RX ADMIN — DIPHENHYDRAMINE HYDROCHLORIDE AND LIDOCAINE HYDROCHLORIDE AND ALUMINUM HYDROXIDE AND MAGNESIUM HYDRO 5 ML: KIT at 11:00

## 2023-11-09 RX ADMIN — Medication 250 MG: at 21:23

## 2023-11-09 RX ADMIN — CYANOCOBALAMIN TAB 500 MCG 1000 MCG: 500 TAB at 09:01

## 2023-11-09 RX ADMIN — DIPHENOXYLATE HYDROCHLORIDE AND ATROPINE SULFATE 1 TABLET: 2.5; .025 TABLET ORAL at 10:59

## 2023-11-09 RX ADMIN — APIXABAN 5 MG: 5 TABLET, FILM COATED ORAL at 21:23

## 2023-11-09 RX ADMIN — CHOLESTYRAMINE 1 PACKET: 4 POWDER, FOR SUSPENSION ORAL at 11:47

## 2023-11-09 RX ADMIN — BACLOFEN 10 MG: 10 TABLET ORAL at 19:32

## 2023-11-09 RX ADMIN — FAMOTIDINE 40 MG: 20 TABLET, FILM COATED ORAL at 09:01

## 2023-11-09 RX ADMIN — APIXABAN 5 MG: 5 TABLET, FILM COATED ORAL at 09:00

## 2023-11-09 RX ADMIN — INSULIN LISPRO 5 UNITS: 100 INJECTION, SOLUTION INTRAVENOUS; SUBCUTANEOUS at 12:43

## 2023-11-09 RX ADMIN — INSULIN LISPRO 5 UNITS: 100 INJECTION, SOLUTION INTRAVENOUS; SUBCUTANEOUS at 09:02

## 2023-11-09 RX ADMIN — INSULIN LISPRO 8 UNITS: 100 INJECTION, SOLUTION INTRAVENOUS; SUBCUTANEOUS at 21:27

## 2023-11-09 RX ADMIN — INSULIN LISPRO 5 UNITS: 100 INJECTION, SOLUTION INTRAVENOUS; SUBCUTANEOUS at 17:48

## 2023-11-09 RX ADMIN — BACLOFEN 10 MG: 10 TABLET ORAL at 11:06

## 2023-11-09 RX ADMIN — ATORVASTATIN CALCIUM 10 MG: 10 TABLET, FILM COATED ORAL at 21:23

## 2023-11-09 RX ADMIN — INSULIN DETEMIR 70 UNITS: 100 INJECTION, SOLUTION SUBCUTANEOUS at 10:58

## 2023-11-09 RX ADMIN — LISINOPRIL 2.5 MG: 2.5 TABLET ORAL at 10:59

## 2023-11-09 RX ADMIN — INSULIN DETEMIR 65 UNITS: 100 INJECTION, SOLUTION SUBCUTANEOUS at 21:27

## 2023-11-09 RX ADMIN — HYDROCODONE BITARTRATE AND ACETAMINOPHEN 1 TABLET: 7.5; 325 TABLET ORAL at 21:23

## 2023-11-09 RX ADMIN — Medication 250 MG: at 09:02

## 2023-11-09 RX ADMIN — HYDROCORTISONE 1 APPLICATION: 1 OINTMENT TOPICAL at 04:11

## 2023-11-09 RX ADMIN — HYDROCORTISONE 1 APPLICATION: 1 OINTMENT TOPICAL at 19:32

## 2023-11-09 RX ADMIN — Medication: at 11:00

## 2023-11-09 RX ADMIN — EMPAGLIFLOZIN 10 MG: 10 TABLET, FILM COATED ORAL at 09:01

## 2023-11-09 RX ADMIN — IBUPROFEN 400 MG: 400 TABLET ORAL at 19:32

## 2023-11-09 RX ADMIN — PREGABALIN 100 MG: 100 CAPSULE ORAL at 21:23

## 2023-11-09 NOTE — CONSULTS
11/09/23 1545   Coping/Psychosocial   Observed Emotional State pleasant   Verbalized Emotional State frustration;hopelessness;loneliness;powerlessness;sadness   Trust Relationship/Rapport thoughts/feelings acknowledged;empathic listening provided   Family/Support Persons other (see comments)  (Jose states that he has a brother that lives in town but is older and also has a hard time carry for himself. The rest of Jose's family lives out of state. He has his [employer] family that [place] [facility] [place]   Additional Documentation Spiritual Care (Group)   Spiritual Care   Spiritual Care Visit Type initial   Spiritual Care Source patient request (describe)   Receptivity to Spiritual Care visit welcomed   Spiritual Care Interventions supportive conversation provided   Response to Spiritual Care emotion expressed;receptive of support;expressing self, indicated difficulty   Use of Spiritual Resources spirituality for coping, indicated strong use of   Spiritual Care Follow-Up follow-up, none required as presently assessed   Spiritual Care Request coping/stress of illness support;spiritual/moral support

## 2023-11-09 NOTE — THERAPY TREATMENT NOTE
Patient Name: Jose Shaikh  : 1960    MRN: [id number]                              Today's Date: 2023       Admit Date: 2023    Visit Dx:     ICD-10-CM ICD-9-CM   1. Difficulty in walking  R26.2 719.7     Patient Active Problem List   Diagnosis    Diabetic ulcer of left heel associated with type 2 DM    Acute osteomyelitis of left calcaneus     Diabetic ulcer of left heel associated with type 2 DM    Diabetic ulcer of right midfoot associated with type 2 DM    Paroxysmal atrial fibrillation    Essential hypertension    Hyperlipidemia LDL goal <100    Cellulitis and abscess of foot    High alkaline phosphatase level    Osteomyelitis    Onychomycosis    Onychocryptosis    Foot pain, bilateral    Osteomyelitis of foot, right, acute    Cellulitis of right foot    Type 2 diabetes mellitus, with long-term current use of insulin    Class 3 severe obesity due to excess calories with serious comorbidity and body mass index (BMI) of 45.0 to 49.9 in adult    Anxiety disorder, unspecified    Claustrophobia    Dependence on wheelchair    Depression, unspecified    Long term (current) use of anticoagulants    Long term (current) use of oral hypoglycemic drugs    Wound of foot    Non-prs chronic ulcer oth prt r foot limited to brkdwn skin    Orthostatic hypotension    Other chronic osteomyelitis, right ankle and foot    Personal history of nicotine dependence    Thrombocytopenia, unspecified    Unspecified open wound, right foot, initial encounter    Diabetic foot infection    Subacute osteomyelitis of right foot    Right foot pain    Sepsis    Onychomycosis    Foot pain, left    Impaired mobility and ADLs    Absence of toe of right foot    Corns and callosity    Disability of walking    Fracture    Limb swelling    Polyneuropathy    Pressure ulcer, stage 1    Shortness of breath    Generalized weakness    Debility     Past Medical History:   Diagnosis Date    Absence of toe of right foot     Acute  osteomyelitis of left calcaneus  08/18/2021    Anxiety and depression     Arthritis     Cancer     Claustrophobia     Corns and callus     Diabetic ulcer of left heel associated with type 2 DM 08/18/2021    Diabetic ulcer of left heel associated with type 2 DM 07/06/2021    Diabetic ulcer of right midfoot associated with type 2 DM 08/18/2021    Difficulty walking     Essential hypertension 08/31/2021    Hammertoe     Hyperlipidemia LDL goal <100 08/31/2021    Ingrown toenail     Obesity     Paroxysmal atrial fibrillation 08/31/2021    Polyneuropathy     Pressure ulcer, stage 1     Tinea unguium     Type 2 diabetes mellitus with polyneuropathy      Past Surgical History:   Procedure Laterality Date    CYST REMOVAL      center of back; benign    EYE SURGERY      INCISION AND DRAINAGE ABSCESS      back    INCISION AND DRAINAGE LEG Right 12/10/2021    Procedure: INCISION AND DRAINAGE LOWER EXTREMITY;  Surgeon: Ash Leyva DPM;  Location: [facility];  Service: Podiatry;  Laterality: Right;    OTHER SURGICAL HISTORY      Surgical clips left foot    TOE SURGERY Right     Removal of 5th toe    TRANS METATARSAL AMPUTATION Right 12/02/2021    Procedure: AMPUTATION TRANS METATARSAL;  Surgeon: Ash Leyva DPM;  Location: [facility];  Service: Podiatry;  Laterality: Right;    VASCULAR SURGERY      WRIST SURGERY Left     repair of injury      General Information       Row Name 11/09/23 1110          OT Time and Intention    Document Type therapy note (daily note)  -PG     Mode of Treatment individual therapy;occupational therapy  -PG               User Key  (r) = Recorded By, (t) = Taken By, (c) = Cosigned By      Initials Name Provider Type    PG Kodak Matos OT Occupational Therapist                     Mobility/ADL's       Row Name 11/09/23 1110          Bed Mobility    Bed Mobility supine-sit  -PG     All Activities, [place] (Bed Mobility) moderate assist (50% patient effort)  -PG        Row Name 11/09/23 1110          Bathing Assessment/Intervention    [place] Level (Bathing) bathing skills;moderate assist (50% patient effort);minimum assist (75% patient effort)  -PG       Row Name 11/09/23 1110          Upper Body Dressing Assessment/Training    [place] Level (Upper Body Dressing) upper body dressing skills;set up  -PG       [facility] Name 11/09/23 1110          Lower Body Dressing Assessment/Training    [place] Level (Lower Body Dressing) lower body dressing skills;dependent (less than 25% patient effort)  -PG       Row Name 11/09/23 1110          Grooming Assessment/Training    [place] Level (Grooming) grooming skills;set up  -PG       [facility] Name 11/09/23 1110          Toileting Assessment/Training    [place] Level (Toileting) toileting skills;dependent (less than 25% patient effort)  -PG               User Key  (r) = Recorded By, (t) = Taken By, (c) = Cosigned By      Initials Name Provider Type    PG Kodak Matos, BEKAH Occupational Therapist                   Obj/Interventions    No documentation.                  Goals/Plan    No documentation.                  Clinical Impression       [facility] Name 11/09/23 1111          Plan of Care Review    Progress no change  -PG               User Key  (r) = Recorded By, (t) = Taken By, (c) = Cosigned By      Initials Name Provider Type    PG Kodak Matos, BEKAH Occupational Therapist                   Outcome Measures       Row Name 11/09/23 1111          How much help from another is currently needed...    Putting on and taking off regular lower body clothing? 3  -PG     Bathing (including washing, rinsing, and drying) 2  -PG     Toileting (which includes using toilet bed pan or urinal) 1  -PG     Putting on and taking off regular upper body clothing 3  -PG     Taking care of personal grooming (such as brushing teeth) 3  -PG     Eating meals 4  -PG     AM-PAC 6 Clicks Score (OT) 16  -PG       Row Name 11/09/23 0421          How much help  from another person do you currently need...    Turning from your back to your side while in flat bed without using bedrails? 2  -TM     Moving from lying on back to sitting on the side of a flat bed without bedrails? 2  -TM     Moving to and from a bed to a chair (including a wheelchair)? 2  -TM     Standing up from a chair using your arms (e.g., wheelchair, bedside chair)? 1  -TM     Climbing 3-5 steps with a railing? 1  -TM     To walk in hospital room? 1  -TM     AM-PAC 6 Clicks Score (PT) 9  -TM     Highest level of mobility 3 --> Sat at edge of bed  -TM       Row Name 11/09/23 1111          Functional Assessment    Outcome Measure Options AM-PAC 6 Clicks Daily Activity (OT);Optimal Instrument  -PG       Row Name 11/09/23 1111          Optimal Instrument    Optimal Instrument Optimal - 3  -PG     Bending/Stooping 5  -PG     Standing 5  -PG     Reaching 1  -PG               User Key  (r) = Recorded By, (t) = Taken By, (c) = Cosigned By      Initials Name Provider Type    TM Rylee Orellana, RN Registered Nurse    PG Kodak Matos OT Occupational Therapist                    Occupational Therapy Education       Title: PT OT SLP Therapies (Done)       Topic: Occupational Therapy (Done)       Point: ADL training (Done)       Description:   Instruct learner(s) on proper safety adaptation and remediation techniques during self care or transfers.   Instruct in proper use of assistive devices.                  Learning Progress Summary             Patient Acceptance, E,D, DU by PG at 11/7/2023 0932                         Point: Home exercise program (Done)       Description:   Instruct learner(s) on appropriate technique for monitoring, assisting and/or progressing therapeutic exercises/activities.                  Learning Progress Summary             Patient Acceptance, E,D, DU by PG at 11/7/2023 0932                         Point: Precautions (Done)       Description:   Instruct learner(s) on prescribed precautions  during self-care and functional transfers.                  Learning Progress Summary             Patient Acceptance, E,D, DU by PG at 11/7/2023 0932                         Point: Body mechanics (Done)       Description:   Instruct learner(s) on proper positioning and spine alignment during self-care, functional mobility activities and/or exercises.                  Learning Progress Summary             Patient Acceptance, E,D, DU by PG at 11/7/2023 0932                                         User Key       Initials Effective Dates Name Provider Type Discipline    PG 06/16/21 -  Kodak Matos OT Occupational Therapist OT                  OT Recommendation and Plan  Planned Therapy Interventions (OT): activity tolerance training, BADL retraining, strengthening exercise, transfer/mobility retraining, patient/caregiver education/training, occupation/activity based interventions  Therapy Frequency (OT): 5 times/wk  Plan of Care Review  Plan of Care Reviewed With: patient  Progress: no change  Outcome Evaluation: Patient presents with limitations affecting strength, activity tolerance, and balance impacting patient's ability to return home safely and independently.  The skills of a therapist will be required to safely and effectively implement the following treatment plan to restore maximal level of function     Time Calculation:   Evaluation Complexity (OT)  Review Occupational Profile/Medical/Therapy History Complexity: brief/low complexity  Assessment, Occupational Performance/Identification of Deficit Complexity: 3-5 performance deficits  Overall Complexity of Evaluation (OT): low complexity     Time Calculation- OT       Row Name 11/09/23 1113             Time Calculation- OT    OT Received On 11/09/23  -PG      OT Goal Re-Cert Due Date 11/27/23  -PG         Timed Charges    01538 - OT Therapeutic Activity Minutes 5  -PG      75653 - OT Self Care/Mgmt Minutes 35  -PG         SNF Occupational Therapy Minutes     Skilled Minutes- OT 35 min  -PG         Total Minutes    Timed Charges Total Minutes 40  -PG       Total Minutes 40  -PG                User Key  (r) = Recorded By, (t) = Taken By, (c) = Cosigned By      Initials Name Provider Type    PG Kodak Matos OT Occupational Therapist                  Therapy Charges for Today       Code Description Service Date Service Provider Modifiers Qty    [id number]  OT THER PROC EA 15 MIN 11/8/2023 Kodak Matos OT GO 2    [id number]  OT SELF CARE/MGMT/TRAIN EA 15 MIN 11/9/2023 Kodak Matos OT GO 2                 Kodak Matos OT  11/9/2023

## 2023-11-09 NOTE — PLAN OF CARE
Goal Outcome Evaluation:              Outcome Evaluation: Plan of care continues at this time. Care team met with patient to discuss therapy plan. Patient is alert and oriented x 4, has used call light for assist and is able to make needs known.Call light in reach.

## 2023-11-09 NOTE — PLAN OF CARE
Goal Outcome Evaluation:  Plan of Care Reviewed With: patient        Progress: no change  Outcome Evaluation: Alert and oriented x4; able to call for assist as needed. Medication administered for left hip and knee pain as needed; see MAR. Continues to be incontinent of soft brown stool. Utilizes urinal to void. Refused to have heels and lower legs elevated off bed. Call light within reach; care plan ongoing.

## 2023-11-09 NOTE — THERAPY TREATMENT NOTE
SNF - Physical Therapy Treatment Note  [facility]     Patient Name: Jose Shaikh  : 1960  MRN: [id number]  Today's Date: 2023      Visit Dx:    ICD-10-CM ICD-9-CM   1. Difficulty in walking  R26.2 719.7     Patient Active Problem List   Diagnosis    Diabetic ulcer of left heel associated with type 2 DM    Acute osteomyelitis of left calcaneus     Diabetic ulcer of left heel associated with type 2 DM    Diabetic ulcer of right midfoot associated with type 2 DM    Paroxysmal atrial fibrillation    Essential hypertension    Hyperlipidemia LDL goal <100    Cellulitis and abscess of foot    High alkaline phosphatase level    Osteomyelitis    Onychomycosis    Onychocryptosis    Foot pain, bilateral    Osteomyelitis of foot, right, acute    Cellulitis of right foot    Type 2 diabetes mellitus, with long-term current use of insulin    Class 3 severe obesity due to excess calories with serious comorbidity and body mass index (BMI) of 45.0 to 49.9 in adult    Anxiety disorder, unspecified    Claustrophobia    Dependence on wheelchair    Depression, unspecified    Long term (current) use of anticoagulants    Long term (current) use of oral hypoglycemic drugs    Wound of foot    Non-prs chronic ulcer oth prt r foot limited to brkdwn skin    Orthostatic hypotension    Other chronic osteomyelitis, right ankle and foot    Personal history of nicotine dependence    Thrombocytopenia, unspecified    Unspecified open wound, right foot, initial encounter    Diabetic foot infection    Subacute osteomyelitis of right foot    Right foot pain    Sepsis    Onychomycosis    Foot pain, left    Impaired mobility and ADLs    Absence of toe of right foot    Corns and callosity    Disability of walking    Fracture    Limb swelling    Polyneuropathy    Pressure ulcer, stage 1    Shortness of breath    Generalized weakness    Debility     Past Medical History:   Diagnosis Date    Absence of toe of right foot     Acute osteomyelitis of  left calcaneus  08/18/2021    Anxiety and depression     Arthritis     Cancer     Claustrophobia     Corns and callus     Diabetic ulcer of left heel associated with type 2 DM 08/18/2021    Diabetic ulcer of left heel associated with type 2 DM 07/06/2021    Diabetic ulcer of right midfoot associated with type 2 DM 08/18/2021    Difficulty walking     Essential hypertension 08/31/2021    Hammertoe     Hyperlipidemia LDL goal <100 08/31/2021    Ingrown toenail     Obesity     Paroxysmal atrial fibrillation 08/31/2021    Polyneuropathy     Pressure ulcer, stage 1     Tinea unguium     Type 2 diabetes mellitus with polyneuropathy      Past Surgical History:   Procedure Laterality Date    CYST REMOVAL      center of back; benign    EYE SURGERY      INCISION AND DRAINAGE ABSCESS      back    INCISION AND DRAINAGE LEG Right 12/10/2021    Procedure: INCISION AND DRAINAGE LOWER EXTREMITY;  Surgeon: Ash Leyva DPM;  Location: [facility];  Service: Podiatry;  Laterality: Right;    OTHER SURGICAL HISTORY      Surgical clips left foot    TOE SURGERY Right     Removal of 5th toe    TRANS METATARSAL AMPUTATION Right 12/02/2021    Procedure: AMPUTATION TRANS METATARSAL;  Surgeon: Ash Leyva DPM;  Location: [facility];  Service: Podiatry;  Laterality: Right;    VASCULAR SURGERY      WRIST SURGERY Left     repair of injury       PT Assessment (last 12 hours)       PT Evaluation and Treatment       Row Name 11/09/23 1215          Physical Therapy Time and Intention    Document Type therapy note (daily note)  -WM     Mode of Treatment individual therapy;physical therapy  -WM     Patient Effort good  -WM     Symptoms Noted During/After Treatment fatigue  -WM       Row Name 11/09/23 1213          Pain Scale: FACES Pre/Post-Treatment    Pain: FACES Scale, Pretreatment 6-->hurts even more  -WM     Posttreatment Pain Rating 6-->hurts even more  -WM     Pain Location - Side/Orientation Left  -WM     Pain  Location - hip;knee  -       Row Name 11/09/23 1215          Bed Mobility    Sit-Supine [place] (Bed Mobility) moderate assist (50% patient effort);verbal cues;1 person assist  -     Bed Mobility, Safety Issues decreased use of arms for pushing/pulling;decreased use of legs for bridging/pushing  -     Assistive Device (Bed Mobility) bed rails;draw sheet  -       Row Name 11/09/23 1215          Sit-Stand Transfer    Comment, (Sit-Stand Transfer) Pt made 3 attempts to stand, but was not able to fully extend his knees secondary to pain  -       Row Name 11/09/23 1215          Safety Issues, Functional Mobility    Impairments Affecting Function (Mobility) balance;endurance/activity tolerance;pain;range of motion (ROM);strength  -       Row Name 11/09/23 1215          Hip (Therapeutic Exercise)    Hip AAROM (Therapeutic Exercise) bilateral;aBduction;aDduction;supine;10 repetitions;2 sets  -     Hip Isometrics (Therapeutic Exercise) bilateral;gluteal sets;supine;10 repetitions;3 second hold;2 sets  -     Hip Strengthening (Therapeutic Exercise) right;heel slides;supine;20 repititions  Manual resistance  -       Row Name 11/09/23 1215          Knee (Therapeutic Exercise)    Knee Isometrics (Therapeutic Exercise) bilateral;quad sets;supine;10 repetitions;2 sets  -     Knee Strengthening (Therapeutic Exercise) right;SAQ (short arc quad);supine;2 lb free weight;20 repititions  -       Row Name 11/09/23 1215          Ankle (Therapeutic Exercise)    Ankle AROM (Therapeutic Exercise) bilateral;dorsiflexion;plantarflexion;supine;20 repititions  -       Row Name             Wound 12/02/21 Right anterior foot Incision    Wound - Properties Group Placement Date: 12/02/21  -ES Side: Right  -ES Orientation: anterior  -ES Location: foot  -ES Primary Wound Type: Incision  -ES    Retired Wound - Properties Group Placement Date: 12/02/21  -ES Side: Right  -ES Orientation: anterior  -ES Location: foot  -ES  Primary Wound Type: Incision  -ES    Retired Wound - Properties Group Date first assessed: 12/02/21  -ES Side: Right  -ES Location: foot  -ES Primary Wound Type: Incision  -ES      Row Name             Wound 11/01/23 1312 Left anterior second toe Blisters    Wound - Properties Group Placement Date: 11/01/23  -RASHARD Placement Time: 1312  -RASHARD Present on Original Admission: N  -RASHARD Side: Left  -RASHARD Orientation: anterior  -RASHARD Location: second toe  -RASHARD Primary Wound Type: Blisters  -RASHARD    Retired Wound - Properties Group Placement Date: 11/01/23  -RASHARD Placement Time: 1312  -RASHARD Present on Original Admission: N  -RASHARD Side: Left  -RASHARD Orientation: anterior  -RASHARD Location: second toe  -RASHARD Primary Wound Type: Blisters  -RASHARD    Retired Wound - Properties Group Date first assessed: 11/01/23  -RASHARD Time first assessed: 1312  -RASHARD Present on Original Admission: N  -RASHARD Side: Left  -RASHARD Location: second toe  -RASHARD Primary Wound Type: Blisters  -RASHARD      Row Name             Wound 11/06/23 1611 Left posterior heel Other (comment)    Wound - Properties Group Placement Date: 11/06/23  -FH Placement Time: 1611  -FH Side: Left  -FH Orientation: posterior  -FH Location: heel  -FH Primary Wound Type: Other  -FH, redness     Retired Wound - Properties Group Placement Date: 11/06/23  -FH Placement Time: 1611  -FH Side: Left  -FH Orientation: posterior  -FH Location: heel  -FH Primary Wound Type: Other  -FH, redness     Retired Wound - Properties Group Date first assessed: 11/06/23  -FH Time first assessed: 1611  -FH Side: Left  -FH Location: heel  -FH Primary Wound Type: Other  -FH, redness       Row Name             Wound 10/17/23 0546 Bilateral perirectal MASD (Moisture associated skin damage)    Wound - Properties Group Placement Date: 10/17/23  -AS Placement Time: 0546  -AS Present on Original Admission: N  -AS Side: Bilateral  -AS Location: perirectal  -AS Primary Wound Type: MASD  -AS    Retired Wound - Properties Group Placement Date: 10/17/23  -AS  Placement Time: 0546  -AS Present on Original Admission: N  -AS Side: Bilateral  -AS Location: perirectal  -AS Primary Wound Type: MASD  -AS    Retired Wound - Properties Group Date first assessed: 10/17/23  -AS Time first assessed: 0546  -AS Present on Original Admission: N  -AS Side: Bilateral  -AS Location: perirectal  -AS Primary Wound Type: MASD  -AS      Row Name             Wound 11/07/23 1240 Right anterior foot    Wound - Properties Group Placement Date: 11/07/23  - Placement Time: 1240  -FH Side: Right  -FH Orientation: anterior  -FH Location: foot  -FH    Retired Wound - Properties Group Placement Date: 11/07/23  - Placement Time: 1240  -FH Side: Right  -FH Orientation: anterior  -FH Location: foot  -FH    Retired Wound - Properties Group Date first assessed: 11/07/23  - Time first assessed: 1240  -FH Side: Right  -FH Location: foot  -FH      Row Name             Wound 11/07/23 1238 Left lower arm    Wound - Properties Group Placement Date: 11/07/23  - Placement Time: 1238  -FH Side: Left  -FH Orientation: lower  -FH Location: arm  -FH    Retired Wound - Properties Group Placement Date: 11/07/23  - Placement Time: 1238  -FH Side: Left  -FH Orientation: lower  -FH Location: arm  -FH    Retired Wound - Properties Group Date first assessed: 11/07/23  - Time first assessed: 1238  -FH Side: Left  -FH Location: arm  -FH      Row Name 11/09/23 1215          Positioning and Restraints    Pre-Treatment Position other (comment)  Sitting EOB with OT  -WM     Post Treatment Position bed  -WM     In Bed fowlers;call light within reach;encouraged to call for assist  -WM       Row Name 11/09/23 1215          Progress Summary (PT)    Progress Toward Functional Goals (PT) progress toward functional goals is fair  -WM               User Key  (r) = Recorded By, (t) = Taken By, (c) = Cosigned By      Initials Name Provider Type    Karon Jordan RN Registered Nurse    aCrlos Anguiano RN  Registered Nurse    Carson Johnson PTA Physical Therapist Assistant    AS Angelina Alfredo RN Registered Nurse    Katlyn Garcia RN Registered Nurse                  Section G              Section GG                       Physical Therapy Education       Title: PT OT SLP Therapies (Done)       Topic: Physical Therapy (Done)       Point: Mobility training (Done)       Learning Progress Summary             Patient Acceptance, E,TB, VU by MOE at 11/8/2023 1341                         Point: Precautions (Done)       Learning Progress Summary             Patient Acceptance, E,TB, VU by MOE at 11/8/2023 1341                                         User Key       Initials Effective Dates Name Provider Type Discipline    MOE 06/03/21 -  Merlin Rao PT Physical Therapist PT                  PT Recommendation and Plan     Progress Summary (PT)  Progress Toward Functional Goals (PT): progress toward functional goals is fair   Outcome Measures       Row Name 11/09/23 1222 11/08/23 1341          How much help from another person do you currently need...    Turning from your back to your side while in flat bed without using bedrails? 2  -WM 2  -MOE     Moving from lying on back to sitting on the side of a flat bed without bedrails? 2  -WM 2  -MOE     Moving to and from a bed to a chair (including a wheelchair)? 2  -WM 2  -MOE     Standing up from a chair using your arms (e.g., wheelchair, bedside chair)? 1  -WM 1  -MOE     Climbing 3-5 steps with a railing? 1  -WM 1  -MOE     To walk in hospital room? 1  -WM 1  -MOE     AM-PAC 6 Clicks Score (PT) 9  -WM 9  -MOE     Highest level of mobility 3 --> Sat at edge of bed  -WM 3 --> Sat at edge of bed  -MOE        Functional Assessment    Outcome Measure Options -- AM-PAC 6 Clicks Basic Mobility (PT)  -MOE               User Key  (r) = Recorded By, (t) = Taken By, (c) = Cosigned By      Initials Name Provider Type    Carson Jhonson PTA Physical Therapist Assistant    MOE Rao  Merlin BRIONES, PT Physical Therapist                     Time Calculation:    PT Charges       Row Name 11/09/23 1203             Time Calculation    PT Received On 11/09/23  -WM         Timed Charges    53517 - PT Therapeutic Exercise Minutes 16  -WM      65063 - PT Therapeutic Activity Minutes 12  -WM         SNF Physical Therapy Minutes    Skilled Minutes- PT 28 min  -WM         Total Minutes    Timed Charges Total Minutes 28  -WM       Total Minutes 28  -WM                User Key  (r) = Recorded By, (t) = Taken By, (c) = Cosigned By      Initials Name Provider Type    Carson Johnson PTA Physical Therapist Assistant                      PT G-Codes  Outcome Measure Options: AM-PAC 6 Clicks Daily Activity (OT), Optimal Instrument  AM-PAC 6 Clicks Score (PT): 9  AM-PAC 6 Clicks Score (OT): 16    Carson Inman PTA  11/9/2023

## 2023-11-10 LAB
ALBUMIN SERPL-MCNC: 2.9 G/DL (ref 3.5–5.2)
ANION GAP SERPL CALCULATED.3IONS-SCNC: 6.2 MMOL/L (ref 5–15)
BUN SERPL-MCNC: 15 MG/DL (ref 8–23)
BUN/CREAT SERPL: 31.3 (ref 7–25)
CALCIUM SPEC-SCNC: 8.3 MG/DL (ref 8.6–10.5)
CHLORIDE SERPL-SCNC: 105 MMOL/L (ref 98–107)
CO2 SERPL-SCNC: 24.8 MMOL/L (ref 22–29)
CREAT SERPL-MCNC: 0.48 MG/DL (ref 0.76–1.27)
DEPRECATED RDW RBC AUTO: 44.1 FL (ref 37–54)
EGFRCR SERPLBLD CKD-EPI 2021: 114.6 ML/MIN/1.73
ERYTHROCYTE [DISTWIDTH] IN BLOOD BY AUTOMATED COUNT: 15.7 % (ref 12.3–15.4)
GLUCOSE BLDC GLUCOMTR-MCNC: 109 MG/DL (ref 70–99)
GLUCOSE BLDC GLUCOMTR-MCNC: 136 MG/DL (ref 70–99)
GLUCOSE BLDC GLUCOMTR-MCNC: 187 MG/DL (ref 70–99)
GLUCOSE BLDC GLUCOMTR-MCNC: 223 MG/DL (ref 70–99)
GLUCOSE SERPL-MCNC: 123 MG/DL (ref 65–99)
HCT VFR BLD AUTO: 29.5 % (ref 37.5–51)
HGB BLD-MCNC: 8.8 G/DL (ref 13–17.7)
MAGNESIUM SERPL-MCNC: 2 MG/DL (ref 1.6–2.4)
MCH RBC QN AUTO: 23.2 PG (ref 26.6–33)
MCHC RBC AUTO-ENTMCNC: 29.8 G/DL (ref 31.5–35.7)
MCV RBC AUTO: 77.6 FL (ref 79–97)
PHOSPHATE SERPL-MCNC: 3.4 MG/DL (ref 2.5–4.5)
PLATELET # BLD AUTO: 95 10*3/MM3 (ref 140–450)
PMV BLD AUTO: 11.6 FL (ref 6–12)
POTASSIUM SERPL-SCNC: 4 MMOL/L (ref 3.5–5.2)
RBC # BLD AUTO: 3.8 10*6/MM3 (ref 4.14–5.8)
SODIUM SERPL-SCNC: 136 MMOL/L (ref 136–145)
WBC NRBC COR # BLD: 4.42 10*3/MM3 (ref 3.4–10.8)

## 2023-11-10 PROCEDURE — 63710000001 INSULIN LISPRO (HUMAN) PER 5 UNITS: Performed by: INTERNAL MEDICINE

## 2023-11-10 PROCEDURE — 97110 THERAPEUTIC EXERCISES: CPT

## 2023-11-10 PROCEDURE — 85027 COMPLETE CBC AUTOMATED: CPT | Performed by: PHYSICIAN ASSISTANT

## 2023-11-10 PROCEDURE — 83735 ASSAY OF MAGNESIUM: CPT | Performed by: PHYSICIAN ASSISTANT

## 2023-11-10 PROCEDURE — 97530 THERAPEUTIC ACTIVITIES: CPT

## 2023-11-10 PROCEDURE — 63710000001 INSULIN DETEMIR PER 5 UNITS: Performed by: INTERNAL MEDICINE

## 2023-11-10 PROCEDURE — 80069 RENAL FUNCTION PANEL: CPT | Performed by: PHYSICIAN ASSISTANT

## 2023-11-10 PROCEDURE — 82948 REAGENT STRIP/BLOOD GLUCOSE: CPT

## 2023-11-10 RX ADMIN — BACLOFEN 10 MG: 10 TABLET ORAL at 12:27

## 2023-11-10 RX ADMIN — CYANOCOBALAMIN TAB 500 MCG 1000 MCG: 500 TAB at 10:06

## 2023-11-10 RX ADMIN — INSULIN LISPRO 8 UNITS: 100 INJECTION, SOLUTION INTRAVENOUS; SUBCUTANEOUS at 20:14

## 2023-11-10 RX ADMIN — PREGABALIN 100 MG: 100 CAPSULE ORAL at 20:13

## 2023-11-10 RX ADMIN — INSULIN LISPRO 5 UNITS: 100 INJECTION, SOLUTION INTRAVENOUS; SUBCUTANEOUS at 17:50

## 2023-11-10 RX ADMIN — INSULIN LISPRO 5 UNITS: 100 INJECTION, SOLUTION INTRAVENOUS; SUBCUTANEOUS at 08:09

## 2023-11-10 RX ADMIN — INSULIN DETEMIR 70 UNITS: 100 INJECTION, SOLUTION SUBCUTANEOUS at 10:07

## 2023-11-10 RX ADMIN — IBUPROFEN 400 MG: 400 TABLET ORAL at 08:08

## 2023-11-10 RX ADMIN — Medication: at 10:08

## 2023-11-10 RX ADMIN — BACLOFEN 10 MG: 10 TABLET ORAL at 20:13

## 2023-11-10 RX ADMIN — HYDROCODONE BITARTRATE AND ACETAMINOPHEN 1 TABLET: 7.5; 325 TABLET ORAL at 22:28

## 2023-11-10 RX ADMIN — CHOLESTYRAMINE 1 PACKET: 4 POWDER, FOR SUSPENSION ORAL at 10:08

## 2023-11-10 RX ADMIN — ATORVASTATIN CALCIUM 10 MG: 10 TABLET, FILM COATED ORAL at 20:13

## 2023-11-10 RX ADMIN — INSULIN DETEMIR 65 UNITS: 100 INJECTION, SOLUTION SUBCUTANEOUS at 20:14

## 2023-11-10 RX ADMIN — EMPAGLIFLOZIN 10 MG: 10 TABLET, FILM COATED ORAL at 10:05

## 2023-11-10 RX ADMIN — LISINOPRIL 2.5 MG: 2.5 TABLET ORAL at 10:24

## 2023-11-10 RX ADMIN — BACLOFEN 10 MG: 10 TABLET ORAL at 03:06

## 2023-11-10 RX ADMIN — PREGABALIN 100 MG: 100 CAPSULE ORAL at 10:06

## 2023-11-10 RX ADMIN — FAMOTIDINE 40 MG: 20 TABLET, FILM COATED ORAL at 10:05

## 2023-11-10 RX ADMIN — INSULIN LISPRO 4 UNITS: 100 INJECTION, SOLUTION INTRAVENOUS; SUBCUTANEOUS at 17:50

## 2023-11-10 RX ADMIN — Medication 250 MG: at 20:13

## 2023-11-10 RX ADMIN — INSULIN LISPRO 5 UNITS: 100 INJECTION, SOLUTION INTRAVENOUS; SUBCUTANEOUS at 12:27

## 2023-11-10 RX ADMIN — APIXABAN 5 MG: 5 TABLET, FILM COATED ORAL at 20:13

## 2023-11-10 RX ADMIN — PREGABALIN 100 MG: 100 CAPSULE ORAL at 16:35

## 2023-11-10 RX ADMIN — Medication 250 MG: at 10:06

## 2023-11-10 RX ADMIN — APIXABAN 5 MG: 5 TABLET, FILM COATED ORAL at 10:07

## 2023-11-10 NOTE — THERAPY TREATMENT NOTE
SNF - Physical Therapy Treatment Note  KE[facility] Dominguez     Patient Name: Jose Shaikh  : 1960  MRN: [id number]  Today's Date: 11/10/2023      Visit Dx:    ICD-10-CM ICD-9-CM   1. Difficulty in walking  R26.2 719.7     Patient Active Problem List   Diagnosis    Diabetic ulcer of left heel associated with type 2 DM    Acute osteomyelitis of left calcaneus     Diabetic ulcer of left heel associated with type 2 DM    Diabetic ulcer of right midfoot associated with type 2 DM    Paroxysmal atrial fibrillation    Essential hypertension    Hyperlipidemia LDL goal <100    Cellulitis and abscess of foot    High alkaline phosphatase level    Osteomyelitis    Onychomycosis    Onychocryptosis    Foot pain, bilateral    Osteomyelitis of foot, right, acute    Cellulitis of right foot    Type 2 diabetes mellitus, with long-term current use of insulin    Class 3 severe obesity due to excess calories with serious comorbidity and body mass index (BMI) of 45.0 to 49.9 in adult    Anxiety disorder, unspecified    Claustrophobia    Dependence on wheelchair    Depression, unspecified    Long term (current) use of anticoagulants    Long term (current) use of oral hypoglycemic drugs    Wound of foot    Non-prs chronic ulcer oth prt r foot limited to brkdwn skin    Orthostatic hypotension    Other chronic osteomyelitis, right ankle and foot    Personal history of nicotine dependence    Thrombocytopenia, unspecified    Unspecified open wound, right foot, initial encounter    Diabetic foot infection    Subacute osteomyelitis of right foot    Right foot pain    Sepsis    Onychomycosis    Foot pain, left    Impaired mobility and ADLs    Absence of toe of right foot    Corns and callosity    Disability of walking    Fracture    Limb swelling    Polyneuropathy    Pressure ulcer, stage 1    Shortness of breath    Generalized weakness    Debility     Past Medical History:   Diagnosis Date    Absence of toe of right foot     Acute osteomyelitis  of left calcaneus  08/18/2021    Anxiety and depression     Arthritis     Cancer     Claustrophobia     Corns and callus     Diabetic ulcer of left heel associated with type 2 DM 08/18/2021    Diabetic ulcer of left heel associated with type 2 DM 07/06/2021    Diabetic ulcer of right midfoot associated with type 2 DM 08/18/2021    Difficulty walking     Essential hypertension 08/31/2021    Hammertoe     Hyperlipidemia LDL goal <100 08/31/2021    Ingrown toenail     Obesity     Paroxysmal atrial fibrillation 08/31/2021    Polyneuropathy     Pressure ulcer, stage 1     Tinea unguium     Type 2 diabetes mellitus with polyneuropathy      Past Surgical History:   Procedure Laterality Date    CYST REMOVAL      center of back; benign    EYE SURGERY      INCISION AND DRAINAGE ABSCESS      back    INCISION AND DRAINAGE LEG Right 12/10/2021    Procedure: INCISION AND DRAINAGE LOWER EXTREMITY;  Surgeon: Ash Leyva DPM;  Location: [facility];  Service: Podiatry;  Laterality: Right;    OTHER SURGICAL HISTORY      Surgical clips left foot    TOE SURGERY Right     Removal of 5th toe    TRANS METATARSAL AMPUTATION Right 12/02/2021    Procedure: AMPUTATION TRANS METATARSAL;  Surgeon: Ash Leyva DPM;  Location: [facility];  Service: Podiatry;  Laterality: Right;    VASCULAR SURGERY      WRIST SURGERY Left     repair of injury       PT Assessment (last 12 hours)       PT Evaluation and Treatment       Row Name 11/10/23 1116          Physical Therapy Time and Intention    Document Type therapy note (daily note)  -WM     Mode of Treatment individual therapy;physical therapy  -WM     Patient Effort good  -WM     Symptoms Noted During/After Treatment fatigue  -WM       Row Name 11/10/23 1116          Pain Scale: FACES Pre/Post-Treatment    Pain: FACES Scale, Pretreatment 6-->hurts even more  -WM     Posttreatment Pain Rating 6-->hurts even more  -WM     Pain Location - Side/Orientation Left  -WM     Pain  Location - hip;knee  -       Row Name 11/10/23 1116          Safety Issues, Functional Mobility    Impairments Affecting Function (Mobility) balance;endurance/activity tolerance;pain;range of motion (ROM);strength  -       Row Name 11/10/23 1116          Hip (Therapeutic Exercise)    Hip AAROM (Therapeutic Exercise) bilateral;aBduction;aDduction;supine;10 repetitions;2 sets  -     Hip Isometrics (Therapeutic Exercise) bilateral;gluteal sets;supine;10 repetitions;3 second hold;2 sets  -       Row Name 11/10/23 1116          Knee (Therapeutic Exercise)    Knee Isometrics (Therapeutic Exercise) bilateral;quad sets;supine;10 repetitions;3 second hold;2 sets  -     Knee Strengthening (Therapeutic Exercise) bilateral;SLR (straight leg raise);supine;20 repititions  -       Row Name 11/10/23 1116          Ankle (Therapeutic Exercise)    Ankle AROM (Therapeutic Exercise) bilateral;dorsiflexion;plantarflexion;supine;20 repititions  -       Row Name             Wound 12/02/21 Right anterior foot Incision    Wound - Properties Group Placement Date: 12/02/21  -ES Side: Right  -ES Orientation: anterior  -ES Location: foot  -ES Primary Wound Type: Incision  -ES    Retired Wound - Properties Group Placement Date: 12/02/21  -ES Side: Right  -ES Orientation: anterior  -ES Location: foot  -ES Primary Wound Type: Incision  -ES    Retired Wound - Properties Group Date first assessed: 12/02/21  -ES Side: Right  -ES Location: foot  -ES Primary Wound Type: Incision  -ES      Row Name             Wound 11/01/23 1312 Left anterior second toe Blisters    Wound - Properties Group Placement Date: 11/01/23  -RASHARD Placement Time: 1312  -RASHARD Present on Original Admission: N  -RASHARD Side: Left  -RASHARD Orientation: anterior  -RASHARD Location: second toe  -RASHARD Primary Wound Type: Blisters  -RASHARD    Retired Wound - Properties Group Placement Date: 11/01/23  -RASHARD Placement Time: 1312  -RASHARD Present on Original Admission: N  -RASHARD Side: Left  -RASHARD Orientation:  anterior  -RASHARD Location: second toe  -RASHARD Primary Wound Type: Blisters  -RASHARD    Retired Wound - Properties Group Date first assessed: 11/01/23  -RASHARD Time first assessed: 1312  -RASHARD Present on Original Admission: N  -RASHARD Side: Left  -RASHARD Location: second toe  -RASHARD Primary Wound Type: Blisters  -RASHARD      Row Name             Wound 11/06/23 1611 Left posterior heel Other (comment)    Wound - Properties Group Placement Date: 11/06/23  -FH Placement Time: 1611  -FH Side: Left  -FH Orientation: posterior  -FH Location: heel  -FH Primary Wound Type: Other  -FH, redness     Retired Wound - Properties Group Placement Date: 11/06/23  -FH Placement Time: 1611  -FH Side: Left  -FH Orientation: posterior  -FH Location: heel  -FH Primary Wound Type: Other  -FH, redness     Retired Wound - Properties Group Date first assessed: 11/06/23  - Time first assessed: 1611  -FH Side: Left  -FH Location: heel  -FH Primary Wound Type: Other  -FH, redness       Row Name             Wound 10/17/23 0546 Bilateral perirectal MASD (Moisture associated skin damage)    Wound - Properties Group Placement Date: 10/17/23  -AS Placement Time: 0546  -AS Present on Original Admission: N  -AS Side: Bilateral  -AS Location: perirectal  -AS Primary Wound Type: MASD  -AS    Retired Wound - Properties Group Placement Date: 10/17/23  -AS Placement Time: 0546  -AS Present on Original Admission: N  -AS Side: Bilateral  -AS Location: perirectal  -AS Primary Wound Type: MASD  -AS    Retired Wound - Properties Group Date first assessed: 10/17/23  -AS Time first assessed: 0546  -AS Present on Original Admission: N  -AS Side: Bilateral  -AS Location: perirectal  -AS Primary Wound Type: MASD  -AS      Row Name             Wound 11/07/23 1240 Right anterior foot    Wound - Properties Group Placement Date: 11/07/23  -FH Placement Time: 1240  -FH Side: Right  -FH Orientation: anterior  -FH Location: foot  -FH    Retired Wound - Properties Group Placement Date: 11/07/23  -  Placement Time: 1240  -FH Side: Right  -FH Orientation: anterior  -FH Location: foot  -FH    Retired Wound - Properties Group Date first assessed: 11/07/23  -FH Time first assessed: 1240  -FH Side: Right  -FH Location: foot  -FH      Row Name             Wound 11/07/23 1238 Left lower arm    Wound - Properties Group Placement Date: 11/07/23  - Placement Time: 1238  -FH Side: Left  -FH Orientation: lower  -FH Location: arm  -FH    Retired Wound - Properties Group Placement Date: 11/07/23  - Placement Time: 1238  -FH Side: Left  -FH Orientation: lower  -FH Location: arm  -FH    Retired Wound - Properties Group Date first assessed: 11/07/23  - Time first assessed: 1238  -FH Side: Left  -FH Location: arm  -FH      Row Name 11/10/23 1116          Positioning and Restraints    Pre-Treatment Position in bed  -WM     Post Treatment Position bed  -WM     In Bed sitting EOB;with OT  -WM       Row Name 11/10/23 1116          Progress Summary (PT)    Progress Toward Functional Goals (PT) progress toward functional goals is fair  -WM     Daily Progress Summary (PT) Pt attempted to stand x 4, but was not able to fully extend his knees.  -WM               User Key  (r) = Recorded By, (t) = Taken By, (c) = Cosigned By      Initials Name Provider Type    Karon Jordan, RN Registered Nurse     Carlos Cagle RN Registered Nurse    Carson Johnson PTA Physical Therapist Assistant    AS Angelina Alfredo RN Registered Nurse    Katlyn Garcia RN Registered Nurse                  Section G              Section GG                       Physical Therapy Education       Title: PT OT SLP Therapies (Done)       Topic: Physical Therapy (Done)       Point: Mobility training (Done)       Learning Progress Summary             Patient Acceptance, E,TB, VU by MOE at 11/8/2023 1341                         Point: Precautions (Done)       Learning Progress Summary             Patient Acceptance, E,TB, VU by MOE at 11/8/2023  1341                                         User Key       Initials Effective Dates Name Provider Type Discipline    MOE 06/03/21 -  Merlin Rao, PT Physical Therapist PT                  PT Recommendation and Plan     Progress Summary (PT)  Progress Toward Functional Goals (PT): progress toward functional goals is fair  Daily Progress Summary (PT): Pt attempted to stand x 4, but was not able to fully extend his knees.   Outcome Measures       Row Name 11/10/23 1120 11/09/23 1222 11/08/23 1341       How much help from another person do you currently need...    Turning from your back to your side while in flat bed without using bedrails? 2  -WM 2  -WM 2  -MOE    Moving from lying on back to sitting on the side of a flat bed without bedrails? 2  -WM 2  -WM 2  -MOE    Moving to and from a bed to a chair (including a wheelchair)? 2  -WM 2  -WM 2  -MOE    Standing up from a chair using your arms (e.g., wheelchair, bedside chair)? 1  -WM 1  -WM 1  -MOE    Climbing 3-5 steps with a railing? 1  -WM 1  -WM 1  -MOE    To walk in hospital room? 1  -WM 1  -WM 1  -MOE    AM-PAC 6 Clicks Score (PT) 9  -WM 9  -WM 9  -MOE    Highest level of mobility 3 --> Sat at edge of bed  -WM 3 --> Sat at edge of bed  -WM 3 --> Sat at edge of bed  -MOE       Functional Assessment    Outcome Measure Options -- -- AM-PAC 6 Clicks Basic Mobility (PT)  -MOE              User Key  (r) = Recorded By, (t) = Taken By, (c) = Cosigned By      Initials Name Provider Type    Carson Johnson, CURTIS Physical Therapist Assistant    Merlin De La O, PT Physical Therapist                     Time Calculation:    PT Charges       Row Name 11/10/23 1115             Time Calculation    PT Received On 11/10/23  -WM         Timed Charges    42128 - PT Therapeutic Exercise Minutes 15  -WM      43155 - PT Therapeutic Activity Minutes 10  -WM         SNF Physical Therapy Minutes    Skilled Minutes- PT 25 min  -WM         Total Minutes    Timed Charges Total Minutes 25   -WM       Total Minutes 25  -WM                User Key  (r) = Recorded By, (t) = Taken By, (c) = Cosigned By      Initials Name Provider Type     Carson Inman PTA Physical Therapist Assistant                  Therapy Charges for Today       Code Description Service Date Service Provider Modifiers Qty    65344391252  PT THER PROC EA 15 MIN 11/9/2023 Carson Inman PTA GP 1    [id number]  PT THERAPEUTIC ACT EA 15 MIN 11/9/2023 Carson Inman PTA GP 1            PT G-Codes  Outcome Measure Options: Optimal Instrument, AM-PAC 6 Clicks Daily Activity (OT)  AM-PAC 6 Clicks Score (PT): 9  AM-PAC 6 Clicks Score (OT): 14    Carson Inman PTA  11/10/2023

## 2023-11-10 NOTE — THERAPY TREATMENT NOTE
SNF - Occupational Therapy Treatment Note  [facility]inguez    Patient Name: Jose Shaikh  : 1960    MRN: [id number]                              Today's Date: 11/10/2023       Admit Date: 2023    Visit Dx:     ICD-10-CM ICD-9-CM   1. Difficulty in walking  R26.2 719.7     Patient Active Problem List   Diagnosis    Diabetic ulcer of left heel associated with type 2 DM    Acute osteomyelitis of left calcaneus     Diabetic ulcer of left heel associated with type 2 DM    Diabetic ulcer of right midfoot associated with type 2 DM    Paroxysmal atrial fibrillation    Essential hypertension    Hyperlipidemia LDL goal <100    Cellulitis and abscess of foot    High alkaline phosphatase level    Osteomyelitis    Onychomycosis    Onychocryptosis    Foot pain, bilateral    Osteomyelitis of foot, right, acute    Cellulitis of right foot    Type 2 diabetes mellitus, with long-term current use of insulin    Class 3 severe obesity due to excess calories with serious comorbidity and body mass index (BMI) of 45.0 to 49.9 in adult    Anxiety disorder, unspecified    Claustrophobia    Dependence on wheelchair    Depression, unspecified    Long term (current) use of anticoagulants    Long term (current) use of oral hypoglycemic drugs    Wound of foot    Non-prs chronic ulcer oth prt r foot limited to brkdwn skin    Orthostatic hypotension    Other chronic osteomyelitis, right ankle and foot    Personal history of nicotine dependence    Thrombocytopenia, unspecified    Unspecified open wound, right foot, initial encounter    Diabetic foot infection    Subacute osteomyelitis of right foot    Right foot pain    Sepsis    Onychomycosis    Foot pain, left    Impaired mobility and ADLs    Absence of toe of right foot    Corns and callosity    Disability of walking    Fracture    Limb swelling    Polyneuropathy    Pressure ulcer, stage 1    Shortness of breath    Generalized weakness    Debility     Past Medical History:   Diagnosis  Date    Absence of toe of right foot     Acute osteomyelitis of left calcaneus  08/18/2021    Anxiety and depression     Arthritis     Cancer     Claustrophobia     Corns and callus     Diabetic ulcer of left heel associated with type 2 DM 08/18/2021    Diabetic ulcer of left heel associated with type 2 DM 07/06/2021    Diabetic ulcer of right midfoot associated with type 2 DM 08/18/2021    Difficulty walking     Essential hypertension 08/31/2021    Hammertoe     Hyperlipidemia LDL goal <100 08/31/2021    Ingrown toenail     Obesity     Paroxysmal atrial fibrillation 08/31/2021    Polyneuropathy     Pressure ulcer, stage 1     Tinea unguium     Type 2 diabetes mellitus with polyneuropathy      Past Surgical History:   Procedure Laterality Date    CYST REMOVAL      center of back; benign    EYE SURGERY      INCISION AND DRAINAGE ABSCESS      back    INCISION AND DRAINAGE LEG Right 12/10/2021    Procedure: INCISION AND DRAINAGE LOWER EXTREMITY;  Surgeon: Ash Leyva DPM;  Location: [facility];  Service: Podiatry;  Laterality: Right;    OTHER SURGICAL HISTORY      Surgical clips left foot    TOE SURGERY Right     Removal of 5th toe    TRANS METATARSAL AMPUTATION Right 12/02/2021    Procedure: AMPUTATION TRANS METATARSAL;  Surgeon: Ash Leyva DPM;  Location: [facility];  Service: Podiatry;  Laterality: Right;    VASCULAR SURGERY      WRIST SURGERY Left     repair of injury      General Information       Row Name 11/10/23 1000          OT Time and Intention    Document Type therapy note (daily note)  -PG     Mode of Treatment individual therapy;occupational therapy  -PG               User Key  (r) = Recorded By, (t) = Taken By, (c) = Cosigned By      Initials Name Provider Type    PG Kodak Matos OT Occupational Therapist                     Mobility/ADL's       Row Name 11/10/23 1000          Bed Mobility    Bed Mobility supine-sit;sit-supine  -PG     All Activities, [place] (Bed  Mobility) standby assist  -PG     Sit-Supine [place] (Bed Mobility) moderate assist (50% patient effort);verbal cues;1 person assist  -PG               User Key  (r) = Recorded By, (t) = Taken By, (c) = Cosigned By      Initials Name Provider Type    PG Kodak Matos OT Occupational Therapist                   Obj/Interventions       Row Name 11/10/23 1001          Shoulder (Therapeutic Exercise)    Shoulder (Therapeutic Exercise) strengthening exercise  -PG       Row Name 11/10/23 1001          Elbow/Forearm (Therapeutic Exercise)    Elbow/Forearm Strengthening (Therapeutic Exercise) 3 lb free weight;15 repititions;resistance band  -PG       Row Name 11/10/23 1001          Motor Skills    Therapeutic Exercise shoulder;elbow/forearm;aerobic  -PG       Row Name 11/10/23 1001          Aerobic Exercise    Type (Aerobic Exercise) arm bike  -PG     Comment, Aerobic Exercise (Therapeutic Exercise) 5 minutes ub bike sitting EOB  -PG               User Key  (r) = Recorded By, (t) = Taken By, (c) = Cosigned By      Initials Name Provider Type    Kodak Velazco OT Occupational Therapist                   Goals/Plan    No documentation.                  Clinical Impression    No documentation.                  Outcome Measures       Row Name 11/10/23 1003          How much help from another is currently needed...    Putting on and taking off regular lower body clothing? 1  -PG     Bathing (including washing, rinsing, and drying) 2  -PG     Toileting (which includes using toilet bed pan or urinal) 1  -PG     Putting on and taking off regular upper body clothing 3  -PG     Taking care of personal grooming (such as brushing teeth) 3  -PG     Eating meals 4  -PG     AM-PAC 6 Clicks Score (OT) 14  -PG       Row Name 11/10/23 1003          Functional Assessment    Outcome Measure Options Optimal Instrument;AM-PAC 6 Clicks Daily Activity (OT)  -PG       Row Name 11/10/23 1003          Optimal Instrument    Bending/Stooping 5   -PG     Standing 4  -PG     Reaching 2  -PG               User Key  (r) = Recorded By, (t) = Taken By, (c) = Cosigned By      Initials Name Provider Type    Kodak Velazco OT Occupational Therapist                  Section G              Section GG  SectionGG: Functional Ability/Goals, Adm  Self Care, Prior Functioning (NW0166C): 1. Dependent  Upper Extremity Range of Motion (DG3744Z): No impairment  Self Care, Admission (Section GG)  Eating: Self-Care Admission Performance (NM8634K5): independent (06)  Oral Hygiene: Self-Care Admission Performance (DI1494K7): independent (06)  Toileting Hygiene: Self-Care Admission Performance (RA9009D5): dependent (01)  Shower/Bathe Self: Self-Care Admission Performance (JD1865D1): substantial/maximal assistance (02)  Upper Body Dressing: Self-Care Admission Performance (SF3807N6): partial/moderate assistance (03)  Lower Body Dressing: Self-Care Admission Performance (YG4250Q0): dependent (01)  Putting On/Taking Off Footwear: Self-Care Admission Performance (UG7811F1): dependent (01)  Personal Hygiene: Self-Care Admission Performance (RN7110A8): dependent (01)  Mobility, Admission Performance (AS8636)  Toilet Transfer: Mobility Admission Performance (RI8126U5): not attempted, medical condition/safety concern (88)  Section GG: Functional Ability/Goals, DC  Eating: Self-Care Discharge Goal (ME7108B4): independent (06)  Oral Hygiene: Self-Care Discharge Goal (OB7266U6): independent (06)  Shower/Bathe Self: Self-Care Discharge Goal (SY1703G4): supervision or touching assistance (04)  Upper Body Dressing: Self-Care Discharge Goal (XW0949K2): setup or clean-up assistance (05)  Lower Body Dressing: Self-Care Discharge Goal (UZ6130N9): supervision or touching assistance (04)  Putting On/Taking Off Footwear: Self-Care Discharge Goal (GQ7377J4): supervision or touching assistance (04)  Personal Hygiene: Self-Care Discharge Goal (PB9761Z7): supervision or touching assistance  (04)  Mobility (GG), Discharge Goal (FG2260)  Toilet Transfer: Mobility Discharge Goal (XO2282Z8): supervision or touching assistance (04)          Occupational Therapy Education       Title: PT OT SLP Therapies (Done)       Topic: Occupational Therapy (Done)       Point: ADL training (Done)       Description:   Instruct learner(s) on proper safety adaptation and remediation techniques during self care or transfers.   Instruct in proper use of assistive devices.                  Learning Progress Summary             Patient Acceptance, E,D, DU by PG at 11/7/2023 0932                         Point: Home exercise program (Done)       Description:   Instruct learner(s) on appropriate technique for monitoring, assisting and/or progressing therapeutic exercises/activities.                  Learning Progress Summary             Patient Acceptance, E,D, DU by PG at 11/7/2023 0932                         Point: Precautions (Done)       Description:   Instruct learner(s) on prescribed precautions during self-care and functional transfers.                  Learning Progress Summary             Patient Acceptance, E,D, DU by PG at 11/7/2023 0932                         Point: Body mechanics (Done)       Description:   Instruct learner(s) on proper positioning and spine alignment during self-care, functional mobility activities and/or exercises.                  Learning Progress Summary             Patient Acceptance, E,D, DU by PG at 11/7/2023 0932                                         User Key       Initials Effective Dates Name Provider Type Discipline    PG 06/16/21 -  Kodak Matos OT Occupational Therapist OT                  OT Recommendation and Plan  Planned Therapy Interventions (OT): activity tolerance training, BADL retraining, strengthening exercise, transfer/mobility retraining, patient/caregiver education/training, occupation/activity based interventions  Therapy Frequency (OT): 5 times/wk  Plan of Care  Review  Plan of Care Reviewed With: patient  Progress: no change  Outcome Evaluation: Patient presents with limitations affecting strength, activity tolerance, and balance impacting patient's ability to return home safely and independently.  The skills of a therapist will be required to safely and effectively implement the following treatment plan to restore maximal level of function     Time Calculation:   Evaluation Complexity (OT)  Review Occupational Profile/Medical/Therapy History Complexity: brief/low complexity  Assessment, Occupational Performance/Identification of Deficit Complexity: 3-5 performance deficits  Overall Complexity of Evaluation (OT): low complexity     Time Calculation- OT       Row Name 11/10/23 1005 11/10/23 1004          Time Calculation- OT    OT Received On -- 11/10/23  -PG     OT Goal Re-Cert Due Date -- 11/27/23  -PG        Timed Charges    95492 - OT Therapeutic Exercise Minutes -- 15  -PG     64452 - OT Therapeutic Activity Minutes -- 40  -PG        SNF Occupational Therapy Minutes    Skilled Minutes- OT 55 min  -PG --        Total Minutes    Timed Charges Total Minutes -- 55  -PG      Total Minutes -- 55  -PG               User Key  (r) = Recorded By, (t) = Taken By, (c) = Cosigned By      Initials Name Provider Type    PG Kodak Matos OT Occupational Therapist                  Therapy Charges for Today       Code Description Service Date Service Provider Modifiers Qty    93390801827 HC OT SELF CARE/MGMT/TRAIN EA 15 MIN 11/9/2023 Kodak Matos OT GO 2    29076401331 HC OT THER PROC EA 15 MIN 11/10/2023 Kodak Matos OT GO 1    12826362903 HC OT THERAPEUTIC ACT EA 15 MIN 11/10/2023 Kodak Matos OT GO 3                 Kodak Matos OT  11/10/2023

## 2023-11-10 NOTE — PLAN OF CARE
Goal Outcome Evaluation:  Plan of Care Reviewed With: patient        Progress: improving  Outcome Evaluation: Alert and oriented x4; able to call for assist as needed. Has been incontinent of bowel and bladder this shift in addition to using the urinal at times. Motrin, Norco, and Baclofen administered for pain as needed; see MAR. Refused turns all shift. Blood sugar 246 at bedtime; insulin per orders. Call light within reach; care plan ongoing.

## 2023-11-11 LAB
GLUCOSE BLDC GLUCOMTR-MCNC: 110 MG/DL (ref 70–99)
GLUCOSE BLDC GLUCOMTR-MCNC: 132 MG/DL (ref 70–99)
GLUCOSE BLDC GLUCOMTR-MCNC: 179 MG/DL (ref 70–99)
GLUCOSE BLDC GLUCOMTR-MCNC: 182 MG/DL (ref 70–99)

## 2023-11-11 PROCEDURE — 63710000001 INSULIN LISPRO (HUMAN) PER 5 UNITS: Performed by: INTERNAL MEDICINE

## 2023-11-11 PROCEDURE — 82948 REAGENT STRIP/BLOOD GLUCOSE: CPT

## 2023-11-11 PROCEDURE — 97110 THERAPEUTIC EXERCISES: CPT

## 2023-11-11 PROCEDURE — 63710000001 INSULIN DETEMIR PER 5 UNITS: Performed by: INTERNAL MEDICINE

## 2023-11-11 RX ADMIN — PREGABALIN 100 MG: 100 CAPSULE ORAL at 09:10

## 2023-11-11 RX ADMIN — Medication: at 09:11

## 2023-11-11 RX ADMIN — INSULIN LISPRO 5 UNITS: 100 INJECTION, SOLUTION INTRAVENOUS; SUBCUTANEOUS at 17:38

## 2023-11-11 RX ADMIN — INSULIN LISPRO 4 UNITS: 100 INJECTION, SOLUTION INTRAVENOUS; SUBCUTANEOUS at 20:06

## 2023-11-11 RX ADMIN — CHOLESTYRAMINE 1 PACKET: 4 POWDER, FOR SUSPENSION ORAL at 09:12

## 2023-11-11 RX ADMIN — ATORVASTATIN CALCIUM 10 MG: 10 TABLET, FILM COATED ORAL at 20:05

## 2023-11-11 RX ADMIN — HYDROCODONE BITARTRATE AND ACETAMINOPHEN 1 TABLET: 7.5; 325 TABLET ORAL at 13:55

## 2023-11-11 RX ADMIN — INSULIN LISPRO 5 UNITS: 100 INJECTION, SOLUTION INTRAVENOUS; SUBCUTANEOUS at 12:01

## 2023-11-11 RX ADMIN — PREGABALIN 100 MG: 100 CAPSULE ORAL at 20:06

## 2023-11-11 RX ADMIN — FAMOTIDINE 40 MG: 20 TABLET, FILM COATED ORAL at 09:10

## 2023-11-11 RX ADMIN — LISINOPRIL 2.5 MG: 2.5 TABLET ORAL at 09:10

## 2023-11-11 RX ADMIN — INSULIN DETEMIR 65 UNITS: 100 INJECTION, SOLUTION SUBCUTANEOUS at 20:07

## 2023-11-11 RX ADMIN — CYANOCOBALAMIN TAB 500 MCG 1000 MCG: 500 TAB at 09:10

## 2023-11-11 RX ADMIN — EMPAGLIFLOZIN 10 MG: 10 TABLET, FILM COATED ORAL at 09:10

## 2023-11-11 RX ADMIN — INSULIN LISPRO 4 UNITS: 100 INJECTION, SOLUTION INTRAVENOUS; SUBCUTANEOUS at 12:01

## 2023-11-11 RX ADMIN — APIXABAN 5 MG: 5 TABLET, FILM COATED ORAL at 09:10

## 2023-11-11 RX ADMIN — PREGABALIN 100 MG: 100 CAPSULE ORAL at 16:27

## 2023-11-11 RX ADMIN — HYDROCODONE BITARTRATE AND ACETAMINOPHEN 1 TABLET: 7.5; 325 TABLET ORAL at 06:40

## 2023-11-11 RX ADMIN — BACLOFEN 10 MG: 10 TABLET ORAL at 11:40

## 2023-11-11 RX ADMIN — Medication 250 MG: at 09:10

## 2023-11-11 RX ADMIN — APIXABAN 5 MG: 5 TABLET, FILM COATED ORAL at 20:06

## 2023-11-11 RX ADMIN — DIPHENOXYLATE HYDROCHLORIDE AND ATROPINE SULFATE 1 TABLET: 2.5; .025 TABLET ORAL at 20:05

## 2023-11-11 RX ADMIN — INSULIN DETEMIR 70 UNITS: 100 INJECTION, SOLUTION SUBCUTANEOUS at 09:09

## 2023-11-11 RX ADMIN — INSULIN LISPRO 5 UNITS: 100 INJECTION, SOLUTION INTRAVENOUS; SUBCUTANEOUS at 09:09

## 2023-11-11 RX ADMIN — Medication 250 MG: at 20:06

## 2023-11-11 NOTE — PLAN OF CARE
Goal Outcome Evaluation:  Plan of Care Reviewed With: patient           Outcome Evaluation: Pt is AO x 4 and VSS. He calls for assistance when needed. Pt uses a urinal at times and can be incontinent of urine and stool. Pt declines repositioning except to be pulled up in the bed or to move his left lower leg. Pt had food from a resturant tonight. He was medicated for pain x 2 and had relief. Will continue with his plan of care. Call light and personal items within reach.

## 2023-11-11 NOTE — PLAN OF CARE
Goal Outcome Evaluation:              Outcome Evaluation: Plan of care continues at this time. Patient alert and oriented x 4, able to call for assist and able to make needs known, patient has had multiple episodes stool incontinence and urinary incontinence at times, patient also uses urinal at times. Patient declines repositioning, patient will roll for staff to assess skin and to assist with cleaning after episodes of incontinence. Barrier ointment applied to skin. Dressing changed to R foot. Blood sugar monitored. Patient educated on carb control diet and review of Insulin orders. Call light in reach.

## 2023-11-11 NOTE — PLAN OF CARE
"Goal Outcome Evaluation:  Plan of Care Reviewed With: patient        Progress: no change  Outcome Evaluation: Resident alert, oriented, able to make needs known to staff. Remained in bed this shift. States is determined to loose 50 lbs so he can have hip and knee surgery. Ate regular breakfast tray and ordered door dash for biscuits and gravy to add to breakfast. has been incont of bowel all shift. skin mascerated around anus. Refused to turn side to side, states can't tolerated due to leg pain. Explained importance of turning and relieving pressure to buttock and the risk for skin breakdown. Stated \"thats ok, they'll just slap a wound vac on it and heal it right up\". Participated with therapy and in bed exercises. unable to move legs himself, asks for staff to reposition legs. Dressing to rt foot intact, no drainage noted. Due to be changed 11/13/23. Resident pleasant, conversant with staff. Continue current plan of care.         "

## 2023-11-11 NOTE — THERAPY TREATMENT NOTE
SNF - Physical Therapy Progress Note  [facility]     Patient Name: Jose Shaikh  : 1960  MRN: [id number]  Today's Date: 2023      Visit Dx:    ICD-10-CM ICD-9-CM   1. Difficulty in walking  R26.2 719.7     Patient Active Problem List   Diagnosis    Diabetic ulcer of left heel associated with type 2 DM    Acute osteomyelitis of left calcaneus     Diabetic ulcer of left heel associated with type 2 DM    Diabetic ulcer of right midfoot associated with type 2 DM    Paroxysmal atrial fibrillation    Essential hypertension    Hyperlipidemia LDL goal <100    Cellulitis and abscess of foot    High alkaline phosphatase level    Osteomyelitis    Onychomycosis    Onychocryptosis    Foot pain, bilateral    Osteomyelitis of foot, right, acute    Cellulitis of right foot    Type 2 diabetes mellitus, with long-term current use of insulin    Class 3 severe obesity due to excess calories with serious comorbidity and body mass index (BMI) of 45.0 to 49.9 in adult    Anxiety disorder, unspecified    Claustrophobia    Dependence on wheelchair    Depression, unspecified    Long term (current) use of anticoagulants    Long term (current) use of oral hypoglycemic drugs    Wound of foot    Non-prs chronic ulcer oth prt r foot limited to brkdwn skin    Orthostatic hypotension    Other chronic osteomyelitis, right ankle and foot    Personal history of nicotine dependence    Thrombocytopenia, unspecified    Unspecified open wound, right foot, initial encounter    Diabetic foot infection    Subacute osteomyelitis of right foot    Right foot pain    Sepsis    Onychomycosis    Foot pain, left    Impaired mobility and ADLs    Absence of toe of right foot    Corns and callosity    Disability of walking    Fracture    Limb swelling    Polyneuropathy    Pressure ulcer, stage 1    Shortness of breath    Generalized weakness    Debility     Past Medical History:   Diagnosis Date    Absence of toe of right foot     Acute osteomyelitis of  left calcaneus  08/18/2021    Anxiety and depression     Arthritis     Cancer     Claustrophobia     Corns and callus     Diabetic ulcer of left heel associated with type 2 DM 08/18/2021    Diabetic ulcer of left heel associated with type 2 DM 07/06/2021    Diabetic ulcer of right midfoot associated with type 2 DM 08/18/2021    Difficulty walking     Essential hypertension 08/31/2021    Hammertoe     Hyperlipidemia LDL goal <100 08/31/2021    Ingrown toenail     Obesity     Paroxysmal atrial fibrillation 08/31/2021    Polyneuropathy     Pressure ulcer, stage 1     Tinea unguium     Type 2 diabetes mellitus with polyneuropathy      Past Surgical History:   Procedure Laterality Date    CYST REMOVAL      center of back; benign    EYE SURGERY      INCISION AND DRAINAGE ABSCESS      back    INCISION AND DRAINAGE LEG Right 12/10/2021    Procedure: INCISION AND DRAINAGE LOWER EXTREMITY;  Surgeon: Ash Leyva DPM;  Location: [facility];  Service: Podiatry;  Laterality: Right;    OTHER SURGICAL HISTORY      Surgical clips left foot    TOE SURGERY Right     Removal of 5th toe    TRANS METATARSAL AMPUTATION Right 12/02/2021    Procedure: AMPUTATION TRANS METATARSAL;  Surgeon: Ash Leyva DPM;  Location: [facility];  Service: Podiatry;  Laterality: Right;    VASCULAR SURGERY      WRIST SURGERY Left     repair of injury       PT Assessment (last 12 hours)       PT Evaluation and Treatment       Row Name 11/11/23 1200          Physical Therapy Time and Intention    Subjective Information no complaints  -CS     Document Type therapy note (daily note)  -CS     Mode of Treatment individual therapy;physical therapy  -CS     Patient Effort good  -CS     Symptoms Noted During/After Treatment none  -CS       Row Name 11/11/23 1200          Hip (Therapeutic Exercise)    Hip AAROM (Therapeutic Exercise) bilateral;supine;10 repetitions;2 sets  hip abd/add, R hip IR/ER, heel slides  -CS     Hip Isometrics  (Therapeutic Exercise) bilateral;supine;10 repetitions;2 sets;gluteal sets  -CS       Row Name 11/11/23 1200          Knee (Therapeutic Exercise)    Knee AAROM (Therapeutic Exercise) bilateral;supine;10 repetitions;2 sets  SAQ's, SLR's  -     Knee Isometrics (Therapeutic Exercise) bilateral;quad sets;supine;10 repetitions;2 sets  -       Row Name 11/11/23 1200          Ankle (Therapeutic Exercise)    Ankle AROM (Therapeutic Exercise) bilateral;dorsiflexion;plantarflexion;supine;20 repititions  -CS       Row Name             Wound 12/02/21 Right anterior foot Incision    Wound - Properties Group Placement Date: 12/02/21  -ES Side: Right  -ES Orientation: anterior  -ES Location: foot  -ES Primary Wound Type: Incision  -ES    Retired Wound - Properties Group Placement Date: 12/02/21  -ES Side: Right  -ES Orientation: anterior  -ES Location: foot  -ES Primary Wound Type: Incision  -ES    Retired Wound - Properties Group Date first assessed: 12/02/21  -ES Side: Right  -ES Location: foot  -ES Primary Wound Type: Incision  -ES      Row Name             Wound 11/01/23 1312 Left anterior second toe Blisters    Wound - Properties Group Placement Date: 11/01/23  -RASHARD Placement Time: 1312  -RASHARD Present on Original Admission: N  -RASHARD Side: Left  -RASHARD Orientation: anterior  -RASHARD Location: second toe  -RASHARD Primary Wound Type: Blisters  -RASHARD    Retired Wound - Properties Group Placement Date: 11/01/23  -RASHARD Placement Time: 1312  -RASHARD Present on Original Admission: N  -RASHARD Side: Left  -RASHARD Orientation: anterior  -RASHARD Location: second toe  -RASHARD Primary Wound Type: Blisters  -RASHARD    Retired Wound - Properties Group Date first assessed: 11/01/23  -RASHARD Time first assessed: 1312  -RASHARD Present on Original Admission: N  -RASHARD Side: Left  -RASHARD Location: second toe  -RASHARD Primary Wound Type: Blisters  -RASHARD      Row Name             Wound 11/06/23 1611 Left posterior heel Other (comment)    Wound - Properties Group Placement Date: 11/06/23  -FH Placement Time:  1611  -FH Side: Left  -FH Orientation: posterior  -FH Location: heel  -FH Primary Wound Type: Other  -FH, redness     Retired Wound - Properties Group Placement Date: 11/06/23  -FH Placement Time: 1611  -FH Side: Left  -FH Orientation: posterior  -FH Location: heel  -FH Primary Wound Type: Other  -FH, redness     Retired Wound - Properties Group Date first assessed: 11/06/23  -FH Time first assessed: 1611  -FH Side: Left  -FH Location: heel  -FH Primary Wound Type: Other  -FH, redness       Row Name             Wound 10/17/23 0546 Bilateral perirectal MASD (Moisture associated skin damage)    Wound - Properties Group Placement Date: 10/17/23  -AS Placement Time: 0546  -AS Present on Original Admission: N  -AS Side: Bilateral  -AS Location: perirectal  -AS Primary Wound Type: MASD  -AS    Retired Wound - Properties Group Placement Date: 10/17/23  -AS Placement Time: 0546  -AS Present on Original Admission: N  -AS Side: Bilateral  -AS Location: perirectal  -AS Primary Wound Type: MASD  -AS    Retired Wound - Properties Group Date first assessed: 10/17/23  -AS Time first assessed: 0546  -AS Present on Original Admission: N  -AS Side: Bilateral  -AS Location: perirectal  -AS Primary Wound Type: MASD  -AS      Row Name             Wound 11/07/23 1240 Right anterior foot    Wound - Properties Group Placement Date: 11/07/23  - Placement Time: 1240  -FH Side: Right  -FH Orientation: anterior  -FH Location: foot  -FH    Retired Wound - Properties Group Placement Date: 11/07/23  -FH Placement Time: 1240  -FH Side: Right  -FH Orientation: anterior  -FH Location: foot  -FH    Retired Wound - Properties Group Date first assessed: 11/07/23  - Time first assessed: 1240  -FH Side: Right  -FH Location: foot  -FH      Row Name             Wound 11/07/23 1238 Left lower arm    Wound - Properties Group Placement Date: 11/07/23  - Placement Time: 1238  -FH Side: Left  -FH Orientation: lower  -FH Location: arm  -FH    Retired Wound  - Properties Group Placement Date: 11/07/23  - Placement Time: 1238  -FH Side: Left  -FH Orientation: lower  -FH Location: arm  -FH    Retired Wound - Properties Group Date first assessed: 11/07/23  - Time first assessed: 1238  -FH Side: Left  -FH Location: arm  -FH      Row Name 11/11/23 1200          Positioning and Restraints    Pre-Treatment Position in bed  -CS     Post Treatment Position bed  -CS     In Bed fowlers;call light within reach;encouraged to call for assist  -CS       Row Name 11/11/23 1200          Progress Summary (PT)    Progress Toward Functional Goals (PT) progress toward functional goals is fair  -CS               User Key  (r) = Recorded By, (t) = Taken By, (c) = Cosigned By      Initials Name Provider Type    Karon Jordan, RN Registered Nurse    Carlos Anguiano RN Registered Nurse    Angelina Pennington RN Registered Nurse    Ronald Huerta PTA Physical Therapist Assistant    Katlyn Garcia RN Registered Nurse                  Section G              Section GG                       Physical Therapy Education       Title: PT OT SLP Therapies (Done)       Topic: Physical Therapy (Done)       Point: Mobility training (Done)       Learning Progress Summary             Patient Acceptance, E,TB, VU by MOE at 11/8/2023 1341                         Point: Precautions (Done)       Learning Progress Summary             Patient Acceptance, E,TB, VU by MOE at 11/8/2023 1341                                         User Key       Initials Effective Dates Name Provider Type Tiffanie ROSA 06/03/21 -  Merlin Rao, PT Physical Therapist PT                  PT Recommendation and Plan     Progress Summary (PT)  Progress Toward Functional Goals (PT): progress toward functional goals is fair   Outcome Measures       Row Name 11/11/23 1200 11/10/23 1120 11/09/23 1222       How much help from another person do you currently need...    Turning from your back to your side while  in flat bed without using bedrails? 2  -CS 2  -WM 2  -WM    Moving from lying on back to sitting on the side of a flat bed without bedrails? 2  -CS 2  -WM 2  -WM    Moving to and from a bed to a chair (including a wheelchair)? 2  -CS 2  -WM 2  -WM    Standing up from a chair using your arms (e.g., wheelchair, bedside chair)? 1  -CS 1  -WM 1  -WM    Climbing 3-5 steps with a railing? 1  -CS 1  -WM 1  -WM    To walk in hospital room? 1  -CS 1  -WM 1  -WM    AM-PAC 6 Clicks Score (PT) 9  -CS 9  -WM 9  -WM    Highest level of mobility 3 --> Sat at edge of bed  -CS 3 --> Sat at edge of bed  -WM 3 --> Sat at edge of bed  -WM       Functional Assessment    Outcome Measure Options AM-PAC 6 Clicks Basic Mobility (PT)  -CS -- --      Row Name 11/08/23 1341             How much help from another person do you currently need...    Turning from your back to your side while in flat bed without using bedrails? 2  -MOE      Moving from lying on back to sitting on the side of a flat bed without bedrails? 2  -MOE      Moving to and from a bed to a chair (including a wheelchair)? 2  -MOE      Standing up from a chair using your arms (e.g., wheelchair, bedside chair)? 1  -MOE      Climbing 3-5 steps with a railing? 1  -MOE      To walk in hospital room? 1  -MOE      AM-PAC 6 Clicks Score (PT) 9  -MOE      Highest level of mobility 3 --> Sat at edge of bed  -MOE         Functional Assessment    Outcome Measure Options AM-PAC 6 Clicks Basic Mobility (PT)  -MOE                User Key  (r) = Recorded By, (t) = Taken By, (c) = Cosigned By      Initials Name Provider Type    Carson Johnson, PTA Physical Therapist Assistant    Merlin De La O, PT Physical Therapist    Ronald Huerta PTA Physical Therapist Assistant                     Time Calculation:    PT Charges       Row Name 11/11/23 9927             Time Calculation    Start Time 0845  -      PT Received On 11/11/23  -         Timed Charges    01857 - PT Therapeutic Exercise  Minutes 23  -CS         Total Minutes    Timed Charges Total Minutes 23  -CS       Total Minutes 23  -CS                User Key  (r) = Recorded By, (t) = Taken By, (c) = Cosigned By      Initials Name Provider Type    CS Ronald Fabian PTA Physical Therapist Assistant                  Therapy Charges for Today       Code Description Service Date Service Provider Modifiers Qty    [id number] HC PT THER PROC EA 15 MIN 11/11/2023 Ronald Fabian PTA GP 2            PT G-Codes  Outcome Measure Options: AM-PAC 6 Clicks Basic Mobility (PT)  AM-PAC 6 Clicks Score (PT): 9  AM-PAC 6 Clicks Score (OT): 14    Ronald Fabian PTA  11/11/2023

## 2023-11-12 LAB
GLUCOSE BLDC GLUCOMTR-MCNC: 116 MG/DL (ref 70–99)
GLUCOSE BLDC GLUCOMTR-MCNC: 138 MG/DL (ref 70–99)
GLUCOSE BLDC GLUCOMTR-MCNC: 188 MG/DL (ref 70–99)
GLUCOSE BLDC GLUCOMTR-MCNC: 229 MG/DL (ref 70–99)

## 2023-11-12 PROCEDURE — 63710000001 INSULIN LISPRO (HUMAN) PER 5 UNITS: Performed by: INTERNAL MEDICINE

## 2023-11-12 PROCEDURE — 82948 REAGENT STRIP/BLOOD GLUCOSE: CPT

## 2023-11-12 PROCEDURE — 63710000001 INSULIN DETEMIR PER 5 UNITS: Performed by: INTERNAL MEDICINE

## 2023-11-12 RX ADMIN — Medication: at 10:08

## 2023-11-12 RX ADMIN — HYDROCODONE BITARTRATE AND ACETAMINOPHEN 1 TABLET: 7.5; 325 TABLET ORAL at 23:58

## 2023-11-12 RX ADMIN — CYANOCOBALAMIN TAB 500 MCG 1000 MCG: 500 TAB at 08:14

## 2023-11-12 RX ADMIN — INSULIN DETEMIR 65 UNITS: 100 INJECTION, SOLUTION SUBCUTANEOUS at 20:00

## 2023-11-12 RX ADMIN — HYDROCORTISONE 1 APPLICATION: 1 OINTMENT TOPICAL at 10:08

## 2023-11-12 RX ADMIN — Medication 250 MG: at 08:14

## 2023-11-12 RX ADMIN — Medication 250 MG: at 20:01

## 2023-11-12 RX ADMIN — ATORVASTATIN CALCIUM 10 MG: 10 TABLET, FILM COATED ORAL at 20:01

## 2023-11-12 RX ADMIN — INSULIN LISPRO 5 UNITS: 100 INJECTION, SOLUTION INTRAVENOUS; SUBCUTANEOUS at 08:14

## 2023-11-12 RX ADMIN — HYDROCODONE BITARTRATE AND ACETAMINOPHEN 1 TABLET: 7.5; 325 TABLET ORAL at 16:54

## 2023-11-12 RX ADMIN — HYDROCORTISONE 1 APPLICATION: 1 OINTMENT TOPICAL at 20:02

## 2023-11-12 RX ADMIN — PREGABALIN 100 MG: 100 CAPSULE ORAL at 16:48

## 2023-11-12 RX ADMIN — INSULIN LISPRO 4 UNITS: 100 INJECTION, SOLUTION INTRAVENOUS; SUBCUTANEOUS at 20:00

## 2023-11-12 RX ADMIN — APIXABAN 5 MG: 5 TABLET, FILM COATED ORAL at 20:01

## 2023-11-12 RX ADMIN — PREGABALIN 100 MG: 100 CAPSULE ORAL at 08:14

## 2023-11-12 RX ADMIN — BACLOFEN 10 MG: 10 TABLET ORAL at 22:32

## 2023-11-12 RX ADMIN — CHOLESTYRAMINE 1 PACKET: 4 POWDER, FOR SUSPENSION ORAL at 08:14

## 2023-11-12 RX ADMIN — INSULIN DETEMIR 70 UNITS: 100 INJECTION, SOLUTION SUBCUTANEOUS at 08:14

## 2023-11-12 RX ADMIN — INSULIN LISPRO 8 UNITS: 100 INJECTION, SOLUTION INTRAVENOUS; SUBCUTANEOUS at 17:39

## 2023-11-12 RX ADMIN — LISINOPRIL 2.5 MG: 2.5 TABLET ORAL at 08:14

## 2023-11-12 RX ADMIN — INSULIN LISPRO 5 UNITS: 100 INJECTION, SOLUTION INTRAVENOUS; SUBCUTANEOUS at 12:12

## 2023-11-12 RX ADMIN — INSULIN LISPRO 5 UNITS: 100 INJECTION, SOLUTION INTRAVENOUS; SUBCUTANEOUS at 17:40

## 2023-11-12 RX ADMIN — APIXABAN 5 MG: 5 TABLET, FILM COATED ORAL at 08:14

## 2023-11-12 RX ADMIN — DIPHENOXYLATE HYDROCHLORIDE AND ATROPINE SULFATE 1 TABLET: 2.5; .025 TABLET ORAL at 08:14

## 2023-11-12 RX ADMIN — EMPAGLIFLOZIN 10 MG: 10 TABLET, FILM COATED ORAL at 08:14

## 2023-11-12 RX ADMIN — BACLOFEN 10 MG: 10 TABLET ORAL at 15:26

## 2023-11-12 RX ADMIN — HYDROCODONE BITARTRATE AND ACETAMINOPHEN 1 TABLET: 7.5; 325 TABLET ORAL at 08:14

## 2023-11-12 RX ADMIN — BACLOFEN 10 MG: 10 TABLET ORAL at 00:08

## 2023-11-12 RX ADMIN — PREGABALIN 100 MG: 100 CAPSULE ORAL at 20:01

## 2023-11-12 RX ADMIN — FAMOTIDINE 40 MG: 20 TABLET, FILM COATED ORAL at 08:14

## 2023-11-12 NOTE — PLAN OF CARE
Goal Outcome Evaluation:  Plan of Care Reviewed With: patient           Outcome Evaluation: Pt is alert and oriented and vitals are stable. He turns in the bed to be cleaned up by staff but otherwise refuses turns. Staff have to move his left leg for him due to him not being able to move it. He has been incontinent of bowel but is continent of urine. His buttocks are red and macerated d/t incontenece of bowel and he was medicated with Immodium. He was medicated for pain x1. We will continue with his plan of care. Call light and personal items within reach.

## 2023-11-12 NOTE — PLAN OF CARE
Goal Outcome Evaluation:  Plan of Care Reviewed With: patient        Progress: no change  Outcome Evaluation: Resident alert, oriented, able to make needs known to staff. Remained in bed all shift. Continues to refuse to turn to offload bottom. Education on risk for skin breakdown provided. Resident continues to order door dash and orders high carb items. Remains incontinent of bowel, calls out when he wants to be changed. states it comes continuously and would need constat changing. Medicated with prn Lomotil with 0 results. MD/PA notified. Blood glucose stable for breakfast and lunch, elevated for dinner, covered with sliding scale. dressing to rt foot intact. dressing change due tomorrow. Resident conversant with staff and pleasant.

## 2023-11-13 VITALS
OXYGEN SATURATION: 96 % | WEIGHT: 315 LBS | HEART RATE: 91 BPM | SYSTOLIC BLOOD PRESSURE: 122 MMHG | TEMPERATURE: 97.5 F | DIASTOLIC BLOOD PRESSURE: 58 MMHG | BODY MASS INDEX: 40.43 KG/M2 | HEIGHT: 74 IN | RESPIRATION RATE: 18 BRPM

## 2023-11-13 LAB
027 TOXIN: ABNORMAL
C DIFF GDH + TOXINS A+B STL QL IA.RAPID: NEGATIVE
C DIFF TOX GENS STL QL NAA+PROBE: POSITIVE
GLUCOSE BLDC GLUCOMTR-MCNC: 122 MG/DL (ref 70–99)
GLUCOSE BLDC GLUCOMTR-MCNC: 158 MG/DL (ref 70–99)
GLUCOSE BLDC GLUCOMTR-MCNC: 192 MG/DL (ref 70–99)
GLUCOSE BLDC GLUCOMTR-MCNC: 215 MG/DL (ref 70–99)

## 2023-11-13 PROCEDURE — 97530 THERAPEUTIC ACTIVITIES: CPT

## 2023-11-13 PROCEDURE — 63710000001 INSULIN LISPRO (HUMAN) PER 5 UNITS: Performed by: INTERNAL MEDICINE

## 2023-11-13 PROCEDURE — 97110 THERAPEUTIC EXERCISES: CPT

## 2023-11-13 PROCEDURE — 87449 NOS EACH ORGANISM AG IA: CPT | Performed by: PHYSICIAN ASSISTANT

## 2023-11-13 PROCEDURE — 82948 REAGENT STRIP/BLOOD GLUCOSE: CPT

## 2023-11-13 PROCEDURE — 99309 SBSQ NF CARE MODERATE MDM 30: CPT | Performed by: PHYSICIAN ASSISTANT

## 2023-11-13 PROCEDURE — 87493 C DIFF AMPLIFIED PROBE: CPT | Performed by: PHYSICIAN ASSISTANT

## 2023-11-13 PROCEDURE — 63710000001 INSULIN DETEMIR PER 5 UNITS: Performed by: INTERNAL MEDICINE

## 2023-11-13 RX ORDER — LIDOCAINE 4 G/G
1 PATCH TOPICAL
Status: DISCONTINUED | OUTPATIENT
Start: 2023-11-13 | End: 2023-11-28

## 2023-11-13 RX ORDER — CHOLESTYRAMINE 4 G/9G
2 POWDER, FOR SUSPENSION ORAL EVERY 12 HOURS SCHEDULED
Status: DISCONTINUED | OUTPATIENT
Start: 2023-11-13 | End: 2023-11-13

## 2023-11-13 RX ORDER — VANCOMYCIN HYDROCHLORIDE 125 MG/1
125 CAPSULE ORAL EVERY 6 HOURS SCHEDULED
Qty: 40 CAPSULE | Refills: 0 | Status: COMPLETED | OUTPATIENT
Start: 2023-11-13 | End: 2023-11-23

## 2023-11-13 RX ADMIN — APIXABAN 5 MG: 5 TABLET, FILM COATED ORAL at 20:08

## 2023-11-13 RX ADMIN — BACLOFEN 10 MG: 10 TABLET ORAL at 20:16

## 2023-11-13 RX ADMIN — PREGABALIN 100 MG: 100 CAPSULE ORAL at 16:37

## 2023-11-13 RX ADMIN — EMPAGLIFLOZIN 10 MG: 10 TABLET, FILM COATED ORAL at 08:03

## 2023-11-13 RX ADMIN — VANCOMYCIN HYDROCHLORIDE 125 MG: 125 CAPSULE ORAL at 17:43

## 2023-11-13 RX ADMIN — LISINOPRIL 2.5 MG: 2.5 TABLET ORAL at 08:03

## 2023-11-13 RX ADMIN — VANCOMYCIN HYDROCHLORIDE 125 MG: 125 CAPSULE ORAL at 14:57

## 2023-11-13 RX ADMIN — PREGABALIN 100 MG: 100 CAPSULE ORAL at 08:03

## 2023-11-13 RX ADMIN — CYANOCOBALAMIN TAB 500 MCG 1000 MCG: 500 TAB at 08:03

## 2023-11-13 RX ADMIN — INSULIN DETEMIR 70 UNITS: 100 INJECTION, SOLUTION SUBCUTANEOUS at 08:05

## 2023-11-13 RX ADMIN — BACLOFEN 10 MG: 10 TABLET ORAL at 08:03

## 2023-11-13 RX ADMIN — INSULIN LISPRO 8 UNITS: 100 INJECTION, SOLUTION INTRAVENOUS; SUBCUTANEOUS at 20:09

## 2023-11-13 RX ADMIN — Medication 250 MG: at 08:03

## 2023-11-13 RX ADMIN — FAMOTIDINE 40 MG: 20 TABLET, FILM COATED ORAL at 08:03

## 2023-11-13 RX ADMIN — PREGABALIN 100 MG: 100 CAPSULE ORAL at 20:08

## 2023-11-13 RX ADMIN — HYDROCODONE BITARTRATE AND ACETAMINOPHEN 1 TABLET: 7.5; 325 TABLET ORAL at 14:57

## 2023-11-13 RX ADMIN — INSULIN LISPRO 5 UNITS: 100 INJECTION, SOLUTION INTRAVENOUS; SUBCUTANEOUS at 12:23

## 2023-11-13 RX ADMIN — INSULIN LISPRO 5 UNITS: 100 INJECTION, SOLUTION INTRAVENOUS; SUBCUTANEOUS at 08:04

## 2023-11-13 RX ADMIN — CHOLESTYRAMINE 1 PACKET: 4 POWDER, FOR SUSPENSION ORAL at 08:03

## 2023-11-13 RX ADMIN — INSULIN LISPRO 4 UNITS: 100 INJECTION, SOLUTION INTRAVENOUS; SUBCUTANEOUS at 08:04

## 2023-11-13 RX ADMIN — ATORVASTATIN CALCIUM 10 MG: 10 TABLET, FILM COATED ORAL at 20:08

## 2023-11-13 RX ADMIN — INSULIN LISPRO 4 UNITS: 100 INJECTION, SOLUTION INTRAVENOUS; SUBCUTANEOUS at 17:42

## 2023-11-13 RX ADMIN — INSULIN LISPRO 5 UNITS: 100 INJECTION, SOLUTION INTRAVENOUS; SUBCUTANEOUS at 17:43

## 2023-11-13 RX ADMIN — VANCOMYCIN HYDROCHLORIDE 125 MG: 125 CAPSULE ORAL at 23:33

## 2023-11-13 RX ADMIN — LIDOCAINE 1 PATCH: 4 PATCH TOPICAL at 12:23

## 2023-11-13 RX ADMIN — Medication: at 12:24

## 2023-11-13 RX ADMIN — Medication 250 MG: at 20:08

## 2023-11-13 RX ADMIN — HYDROCORTISONE 1 APPLICATION: 1 OINTMENT TOPICAL at 23:36

## 2023-11-13 RX ADMIN — TUBERCULIN PURIFIED PROTEIN DERIVATIVE 5 UNITS: 5 INJECTION, SOLUTION INTRADERMAL at 20:09

## 2023-11-13 RX ADMIN — HYDROCODONE BITARTRATE AND ACETAMINOPHEN 1 TABLET: 7.5; 325 TABLET ORAL at 23:32

## 2023-11-13 RX ADMIN — APIXABAN 5 MG: 5 TABLET, FILM COATED ORAL at 08:03

## 2023-11-13 RX ADMIN — INSULIN DETEMIR 65 UNITS: 100 INJECTION, SOLUTION SUBCUTANEOUS at 20:08

## 2023-11-13 NOTE — THERAPY TREATMENT NOTE
SNF - Physical Therapy Treatment Note  KEO Dominguez     Patient Name: Jose Shaikh  : 1960  MRN: [id number]  Today's Date: 2023      Visit Dx:    ICD-10-CM ICD-9-CM   1. Difficulty in walking  R26.2 719.7     Patient Active Problem List   Diagnosis    Diabetic ulcer of left heel associated with type 2 DM    Acute osteomyelitis of left calcaneus     Diabetic ulcer of left heel associated with type 2 DM    Diabetic ulcer of right midfoot associated with type 2 DM    Paroxysmal atrial fibrillation    Essential hypertension    Hyperlipidemia LDL goal <100    Cellulitis and abscess of foot    High alkaline phosphatase level    Osteomyelitis    Onychomycosis    Onychocryptosis    Foot pain, bilateral    Osteomyelitis of foot, right, acute    Cellulitis of right foot    Type 2 diabetes mellitus, with long-term current use of insulin    Class 3 severe obesity due to excess calories with serious comorbidity and body mass index (BMI) of 45.0 to 49.9 in adult    Anxiety disorder, unspecified    Claustrophobia    Dependence on wheelchair    Depression, unspecified    Long term (current) use of anticoagulants    Long term (current) use of oral hypoglycemic drugs    Wound of foot    Non-prs chronic ulcer oth prt r foot limited to brkdwn skin    Orthostatic hypotension    Other chronic osteomyelitis, right ankle and foot    Personal history of nicotine dependence    Thrombocytopenia, unspecified    Unspecified open wound, right foot, initial encounter    Diabetic foot infection    Subacute osteomyelitis of right foot    Right foot pain    Sepsis    Onychomycosis    Foot pain, left    Impaired mobility and ADLs    Absence of toe of right foot    Corns and callosity    Disability of walking    Fracture    Limb swelling    Polyneuropathy    Pressure ulcer, stage 1    Shortness of breath    Generalized weakness    Debility     Past Medical History:   Diagnosis Date    Absence of toe of right foot     Acute osteomyelitis  of left calcaneus  08/18/2021    Anxiety and depression     Arthritis     Cancer     Claustrophobia     Corns and callus     Diabetic ulcer of left heel associated with type 2 DM 08/18/2021    Diabetic ulcer of left heel associated with type 2 DM 07/06/2021    Diabetic ulcer of right midfoot associated with type 2 DM 08/18/2021    Difficulty walking     Essential hypertension 08/31/2021    Hammertoe     Hyperlipidemia LDL goal <100 08/31/2021    Ingrown toenail     Obesity     Paroxysmal atrial fibrillation 08/31/2021    Polyneuropathy     Pressure ulcer, stage 1     Tinea unguium     Type 2 diabetes mellitus with polyneuropathy      Past Surgical History:   Procedure Laterality Date    CYST REMOVAL      center of back; benign    EYE SURGERY      INCISION AND DRAINAGE ABSCESS      back    INCISION AND DRAINAGE LEG Right 12/10/2021    Procedure: INCISION AND DRAINAGE LOWER EXTREMITY;  Surgeon: Ash Leyva DPM;  Location: [facility];  Service: Podiatry;  Laterality: Right;    OTHER SURGICAL HISTORY      Surgical clips left foot    TOE SURGERY Right     Removal of 5th toe    TRANS METATARSAL AMPUTATION Right 12/02/2021    Procedure: AMPUTATION TRANS METATARSAL;  Surgeon: Ash Leyva DPM;  Location: [facility];  Service: Podiatry;  Laterality: Right;    VASCULAR SURGERY      WRIST SURGERY Left     repair of injury       PT Assessment (last 12 hours)       PT Evaluation and Treatment       Row Name 11/13/23 1422          Physical Therapy Time and Intention    Document Type therapy note (daily note)  -WM     Mode of Treatment individual therapy;physical therapy  -WM     Patient Effort good  -WM     Symptoms Noted During/After Treatment fatigue  -WM       Row Name 11/13/23 1422          Pain Scale: FACES Pre/Post-Treatment    Pain: FACES Scale, Pretreatment 6-->hurts even more  -WM     Posttreatment Pain Rating 6-->hurts even more  -WM     Pain Location - Side/Orientation Left  -WM     Pain  Location - hip;knee  -       Row Name 11/13/23 1422          Bed Mobility    Supine-Sit-Supine [place] (Bed Mobility) moderate assist (50% patient effort);1 person assist  -     Bed Mobility, Safety Issues decreased use of legs for bridging/pushing  -     Assistive Device (Bed Mobility) bed rails;leg ;overhead trapeze  -       Row Name 11/13/23 1422          Sit-Stand Transfer    Sit-Stand [place] (Transfers) moderate assist (50% patient effort);2 person assist;verbal cues  -     Assistive Device (Sit-Stand Transfers) walker, front-wheeled  -     Comment, (Sit-Stand Transfer) Pt stood 5 times with knees fully extended but was only able to stand ~ 10 seconds.  -       Row Name 11/13/23 1422          Stand-Sit Transfer    Stand-Sit [place] (Transfers) moderate assist (50% patient effort);2 person assist;verbal cues  -     Assistive Device (Stand-Sit Transfers) walker, front-wheeled  -       Row Name 11/13/23 1422          Safety Issues, Functional Mobility    Impairments Affecting Function (Mobility) balance;endurance/activity tolerance;pain;range of motion (ROM);strength  -       Row Name 11/13/23 1422          Hip (Therapeutic Exercise)    Hip AAROM (Therapeutic Exercise) bilateral;aBduction;aDduction;supine;10 repetitions;2 sets  -     Hip Isometrics (Therapeutic Exercise) bilateral;gluteal sets;supine;10 repetitions;3 second hold;2 sets  -       Row Name 11/13/23 1422          Knee (Therapeutic Exercise)    Knee AROM (Therapeutic Exercise) bilateral;SAQ (short arc quad);supine;20 repititions  -     Knee Isometrics (Therapeutic Exercise) bilateral;quad sets;supine;10 repetitions;3 second hold;2 sets  -     Knee Strengthening (Therapeutic Exercise) bilateral;SLR (straight leg raise);supine;20 repititions  Active-assisted  -       Row Name 11/13/23 1422          Ankle (Therapeutic Exercise)    Ankle AROM (Therapeutic Exercise)  bilateral;dorsiflexion;plantarflexion;supine;20 repititions  -WM       Row Name             Wound 12/02/21 Right anterior foot Incision    Wound - Properties Group Placement Date: 12/02/21  -ES Side: Right  -ES Orientation: anterior  -ES Location: foot  -ES Primary Wound Type: Incision  -ES    Retired Wound - Properties Group Placement Date: 12/02/21  -ES Side: Right  -ES Orientation: anterior  -ES Location: foot  -ES Primary Wound Type: Incision  -ES    Retired Wound - Properties Group Date first assessed: 12/02/21  -ES Side: Right  -ES Location: foot  -ES Primary Wound Type: Incision  -ES      Row Name             Wound 11/01/23 1312 Left anterior second toe Blisters    Wound - Properties Group Placement Date: 11/01/23  -RASHARD Placement Time: 1312  -RASHARD Present on Original Admission: N  -RASHARD Side: Left  -RASHARD Orientation: anterior  -RASHARD Location: second toe  -RASHARD Primary Wound Type: Blisters  -RASHARD    Retired Wound - Properties Group Placement Date: 11/01/23  -RASHARD Placement Time: 1312  -RASHARD Present on Original Admission: N  -RASHARD Side: Left  -RASHARD Orientation: anterior  -RASAHRD Location: second toe  -RASHARD Primary Wound Type: Blisters  -RASHARD    Retired Wound - Properties Group Date first assessed: 11/01/23  -RASHARD Time first assessed: 1312  -RASHARD Present on Original Admission: N  -RASHARD Side: Left  -RASHARD Location: second toe  -RASHARD Primary Wound Type: Blisters  -RASHARD      Row Name             Wound 11/06/23 1611 Left posterior heel Other (comment)    Wound - Properties Group Placement Date: 11/06/23  -FH Placement Time: 1611  -FH Side: Left  -FH Orientation: posterior  -FH Location: heel  -FH Primary Wound Type: Other  -FH, redness     Retired Wound - Properties Group Placement Date: 11/06/23  -FH Placement Time: 1611  -FH Side: Left  -FH Orientation: posterior  -FH Location: heel  -FH Primary Wound Type: Other  -FH, redness     Retired Wound - Properties Group Date first assessed: 11/06/23  -FH Time first assessed: 1611  -FH Side: Left  -FH Location:  heel  -FH Primary Wound Type: Other  -FH, redness       Row Name             Wound 10/17/23 0546 Bilateral perirectal MASD (Moisture associated skin damage)    Wound - Properties Group Placement Date: 10/17/23  -AS Placement Time: 0546  -AS Present on Original Admission: N  -AS Side: Bilateral  -AS Location: perirectal  -AS Primary Wound Type: MASD  -AS    Retired Wound - Properties Group Placement Date: 10/17/23  -AS Placement Time: 0546  -AS Present on Original Admission: N  -AS Side: Bilateral  -AS Location: perirectal  -AS Primary Wound Type: MASD  -AS    Retired Wound - Properties Group Date first assessed: 10/17/23  -AS Time first assessed: 0546  -AS Present on Original Admission: N  -AS Side: Bilateral  -AS Location: perirectal  -AS Primary Wound Type: MASD  -AS      Row Name             Wound 11/07/23 1240 Right anterior foot    Wound - Properties Group Placement Date: 11/07/23  -FH Placement Time: 1240  -FH Side: Right  -FH Orientation: anterior  -FH Location: foot  -FH    Retired Wound - Properties Group Placement Date: 11/07/23  -FH Placement Time: 1240  -FH Side: Right  -FH Orientation: anterior  -FH Location: foot  -FH    Retired Wound - Properties Group Date first assessed: 11/07/23  - Time first assessed: 1240  -FH Side: Right  -FH Location: foot  -FH      Row Name             Wound 11/07/23 1238 Left lower arm    Wound - Properties Group Placement Date: 11/07/23  - Placement Time: 1238  -FH Side: Left  -FH Orientation: lower  -FH Location: arm  -FH    Retired Wound - Properties Group Placement Date: 11/07/23  -FH Placement Time: 1238  -FH Side: Left  -FH Orientation: lower  -FH Location: arm  -FH    Retired Wound - Properties Group Date first assessed: 11/07/23  - Time first assessed: 1238  -FH Side: Left  -FH Location: arm  -FH      Row Name 11/13/23 1422          Positioning and Restraints    Pre-Treatment Position in bed  -WM     Post Treatment Position bed  -WM     In Bed fowlers;with OT   -WM       Row Name 11/13/23 1422          Progress Summary (PT)    Progress Toward Functional Goals (PT) progress toward functional goals is fair  -WM               User Key  (r) = Recorded By, (t) = Taken By, (c) = Cosigned By      Initials Name Provider Type    Karon Jordan, RN Registered Nurse     Carlos Cagle RN Registered Nurse    Carson Johnson PTA Physical Therapist Assistant    AS Angelina Alfredo RN Registered Nurse    Katlyn Garcia RN Registered Nurse                  Section G              Section GG                       Physical Therapy Education       Title: PT OT SLP Therapies (Done)       Topic: Physical Therapy (Done)       Point: Mobility training (Done)       Learning Progress Summary             Patient Acceptance, E,TB, VU by MOE at 11/8/2023 1341                         Point: Precautions (Done)       Learning Progress Summary             Patient Acceptance, E,TB, VU by MOE at 11/8/2023 1341                                         User Key       Initials Effective Dates Name Provider Type [facility] 06/03/21 -  Merlin Rao, PT Physical Therapist PT                  PT Recommendation and Plan     Progress Summary (PT)  Progress Toward Functional Goals (PT): progress toward functional goals is fair  Daily Progress Summary (PT): Pt attempted to stand x 4, but was not able to fully extend his knees.   Outcome Measures       Row Name 11/13/23 1430 11/11/23 1200          How much help from another person do you currently need...    Turning from your back to your side while in flat bed without using bedrails? 2  -WM 2  -CS     Moving from lying on back to sitting on the side of a flat bed without bedrails? 2  -WM 2  -CS     Moving to and from a bed to a chair (including a wheelchair)? 1  -WM 2  -CS     Standing up from a chair using your arms (e.g., wheelchair, bedside chair)? 1  -WM 1  -CS     Climbing 3-5 steps with a railing? 1  -WM 1  -CS     To walk in  hospital room? 1  -WM 1  -CS     AM-PAC 6 Clicks Score (PT) 8  -WM 9  -CS     Highest level of mobility 3 --> Sat at edge of bed  -WM 3 --> Sat at edge of bed  -CS        Functional Assessment    Outcome Measure Options -- AM-PAC 6 Clicks Basic Mobility (PT)  -CS               User Key  (r) = Recorded By, (t) = Taken By, (c) = Cosigned By      Initials Name Provider Type     Carson Inman PTA Physical Therapist Assistant    Ronald Huerat PTA Physical Therapist Assistant                     Time Calculation:    PT Charges       Row Name 11/13/23 1421             Time Calculation    PT Received On 11/13/23  -WM         Timed Charges    53684 - PT Therapeutic Exercise Minutes 17  -WM      39050 - PT Therapeutic Activity Minutes 22  -WM         SNF Physical Therapy Minutes    Skilled Minutes- PT 39 min  -WM         Total Minutes    Timed Charges Total Minutes 39  -WM       Total Minutes 39  -WM                User Key  (r) = Recorded By, (t) = Taken By, (c) = Cosigned By      Initials Name Provider Type     Carson Inman PTA Physical Therapist Assistant                      PT G-Codes  Outcome Measure Options: AM-PAC 6 Clicks Daily Activity (OT), Optimal Instrument  AM-PAC 6 Clicks Score (PT): 8  AM-PAC 6 Clicks Score (OT): 14    Carson Inman PTA  11/13/2023

## 2023-11-13 NOTE — THERAPY TREATMENT NOTE
Patient Name: Jose Shaikh  : 1960    MRN: [id number]                              Today's Date: 2023       Admit Date: 2023    Visit Dx:     ICD-10-CM ICD-9-CM   1. Difficulty in walking  R26.2 719.7     Patient Active Problem List   Diagnosis    Diabetic ulcer of left heel associated with type 2 DM    Acute osteomyelitis of left calcaneus     Diabetic ulcer of left heel associated with type 2 DM    Diabetic ulcer of right midfoot associated with type 2 DM    Paroxysmal atrial fibrillation    Essential hypertension    Hyperlipidemia LDL goal <100    Cellulitis and abscess of foot    High alkaline phosphatase level    Osteomyelitis    Onychomycosis    Onychocryptosis    Foot pain, bilateral    Osteomyelitis of foot, right, acute    Cellulitis of right foot    Type 2 diabetes mellitus, with long-term current use of insulin    Class 3 severe obesity due to excess calories with serious comorbidity and body mass index (BMI) of 45.0 to 49.9 in adult    Anxiety disorder, unspecified    Claustrophobia    Dependence on wheelchair    Depression, unspecified    Long term (current) use of anticoagulants    Long term (current) use of oral hypoglycemic drugs    Wound of foot    Non-prs chronic ulcer oth prt r foot limited to brkdwn skin    Orthostatic hypotension    Other chronic osteomyelitis, right ankle and foot    Personal history of nicotine dependence    Thrombocytopenia, unspecified    Unspecified open wound, right foot, initial encounter    Diabetic foot infection    Subacute osteomyelitis of right foot    Right foot pain    Sepsis    Onychomycosis    Foot pain, left    Impaired mobility and ADLs    Absence of toe of right foot    Corns and callosity    Disability of walking    Fracture    Limb swelling    Polyneuropathy    Pressure ulcer, stage 1    Shortness of breath    Generalized weakness    Debility     Past Medical History:   Diagnosis Date    Absence of toe of right foot     Acute  osteomyelitis of left calcaneus  08/18/2021    Anxiety and depression     Arthritis     Cancer     Claustrophobia     Corns and callus     Diabetic ulcer of left heel associated with type 2 DM 08/18/2021    Diabetic ulcer of left heel associated with type 2 DM 07/06/2021    Diabetic ulcer of right midfoot associated with type 2 DM 08/18/2021    Difficulty walking     Essential hypertension 08/31/2021    Hammertoe     Hyperlipidemia LDL goal <100 08/31/2021    Ingrown toenail     Obesity     Paroxysmal atrial fibrillation 08/31/2021    Polyneuropathy     Pressure ulcer, stage 1     Tinea unguium     Type 2 diabetes mellitus with polyneuropathy      Past Surgical History:   Procedure Laterality Date    CYST REMOVAL      center of back; benign    EYE SURGERY      INCISION AND DRAINAGE ABSCESS      back    INCISION AND DRAINAGE LEG Right 12/10/2021    Procedure: INCISION AND DRAINAGE LOWER EXTREMITY;  Surgeon: Ash Leyva DPM;  Location: [facility];  Service: Podiatry;  Laterality: Right;    OTHER SURGICAL HISTORY      Surgical clips left foot    TOE SURGERY Right     Removal of 5th toe    TRANS METATARSAL AMPUTATION Right 12/02/2021    Procedure: AMPUTATION TRANS METATARSAL;  Surgeon: Ash Leyva DPM;  Location: [facility];  Service: Podiatry;  Laterality: Right;    VASCULAR SURGERY      WRIST SURGERY Left     repair of injury      General Information       Row Name 11/13/23 1259          OT Time and Intention    Document Type therapy note (daily note)  -PG     Mode of Treatment individual therapy;occupational therapy  -PG               User Key  (r) = Recorded By, (t) = Taken By, (c) = Cosigned By      Initials Name Provider Type    PG Kodak Matos OT Occupational Therapist                     Mobility/ADL's    No documentation.                  Obj/Interventions       Row Name 11/13/23 1259          Shoulder (Therapeutic Exercise)    Shoulder Strengthening (Therapeutic Exercise) 4 lb  free weight;20 repititions;resistance band;green;2 sets  -PG       Row Name 11/13/23 1259          Elbow/Forearm (Therapeutic Exercise)    Elbow/Forearm (Therapeutic Exercise) strengthening exercise  -PG     Elbow/Forearm Strengthening (Therapeutic Exercise) 4 lb free weight;20 repititions;green;2 sets  -PG       Row Name 11/13/23 1259          Motor Skills    Therapeutic Exercise shoulder;elbow/forearm  -PG               User Key  (r) = Recorded By, (t) = Taken By, (c) = Cosigned By      Initials Name Provider Type    PG Kodak Matos OT Occupational Therapist                   Goals/Plan    No documentation.                  Clinical Impression       Row Name 11/13/23 1300          Plan of Care Review    Progress no change  -PG               User Key  (r) = Recorded By, (t) = Taken By, (c) = Cosigned By      Initials Name Provider Type    Kodak Velazco OT Occupational Therapist                   Outcome Measures       Row Name 11/13/23 1300          How much help from another is currently needed...    Putting on and taking off regular lower body clothing? 1  -PG     Bathing (including washing, rinsing, and drying) 2  -PG     Toileting (which includes using toilet bed pan or urinal) 1  -PG     Putting on and taking off regular upper body clothing 3  -PG     Taking care of personal grooming (such as brushing teeth) 3  -PG     Eating meals 4  -PG     AM-PAC 6 Clicks Score (OT) 14  -PG       Row Name 11/13/23 1300          Functional Assessment    Outcome Measure Options AM-PAC 6 Clicks Daily Activity (OT);Optimal Instrument  -PG       Row Name 11/13/23 1300          Optimal Instrument    Optimal Instrument Optimal - 3  -PG     Bending/Stooping 5  -PG     Standing 4  -PG     Reaching 1  -PG               User Key  (r) = Recorded By, (t) = Taken By, (c) = Cosigned By      Initials Name Provider Type    Kodak Velazco OT Occupational Therapist                    Occupational Therapy Education       Title: PT OT  SLP Therapies (Done)       Topic: Occupational Therapy (Done)       Point: ADL training (Done)       Description:   Instruct learner(s) on proper safety adaptation and remediation techniques during self care or transfers.   Instruct in proper use of assistive devices.                  Learning Progress Summary             Patient Acceptance, E,D, DU by PG at 11/7/2023 0932                         Point: Home exercise program (Done)       Description:   Instruct learner(s) on appropriate technique for monitoring, assisting and/or progressing therapeutic exercises/activities.                  Learning Progress Summary             Patient Acceptance, E,D, DU by PG at 11/7/2023 0932                         Point: Precautions (Done)       Description:   Instruct learner(s) on prescribed precautions during self-care and functional transfers.                  Learning Progress Summary             Patient Acceptance, E,D, DU by PG at 11/7/2023 0932                         Point: Body mechanics (Done)       Description:   Instruct learner(s) on proper positioning and spine alignment during self-care, functional mobility activities and/or exercises.                  Learning Progress Summary             Patient Acceptance, E,D, DU by PG at 11/7/2023 0932                                         User Key       Initials Effective Dates Name Provider Type Discipline    PG 06/16/21 -  Kodak Matos OT Occupational Therapist OT                  OT Recommendation and Plan  Planned Therapy Interventions (OT): activity tolerance training, BADL retraining, strengthening exercise, transfer/mobility retraining, patient/caregiver education/training, occupation/activity based interventions  Therapy Frequency (OT): 5 times/wk  Plan of Care Review  Plan of Care Reviewed With: patient  Progress: no change  Outcome Evaluation: Patient presents with limitations affecting strength, activity tolerance, and balance impacting patient's ability to  return home safely and independently.  The skills of a therapist will be required to safely and effectively implement the following treatment plan to restore maximal level of function     Time Calculation:   Evaluation Complexity (OT)  Review Occupational Profile/Medical/Therapy History Complexity: brief/low complexity  Assessment, Occupational Performance/Identification of Deficit Complexity: 3-5 performance deficits  Overall Complexity of Evaluation (OT): low complexity     Time Calculation- OT       Row Name 11/13/23 1301             Time Calculation- OT    OT Received On 11/13/23  -PG      OT Goal Re-Cert Due Date 11/27/23  -PG         Timed Charges    68578 - OT Therapeutic Exercise Minutes 30  -PG      25054 - OT Therapeutic Activity Minutes 15  -PG         SNF Occupational Therapy Minutes    Skilled Minutes- OT 45 min  -PG         Total Minutes    Timed Charges Total Minutes 45  -PG       Total Minutes 45  -PG                User Key  (r) = Recorded By, (t) = Taken By, (c) = Cosigned By      Initials Name Provider Type    PG Kodak Matos OT Occupational Therapist                  Therapy Charges for Today       Code Description Service Date Service Provider Modifiers Qty    [id number]  OT THER PROC EA 15 MIN 11/13/2023 Kodak Matos OT GO 2    [id number]  OT THERAPEUTIC ACT EA 15 MIN 11/13/2023 Kodak Matos OT GO 1                 Kodak Matos OT  11/13/2023

## 2023-11-13 NOTE — PROGRESS NOTES
[facility]   Hospitalist Progress Note  Date: 2023  Patient Name: Jose Shaikh  : 1960  MRN: [id number]  Date of admission: 2023      Subjective   Subjective     Chief Complaint: Weakness    Summary: Jose Shaikh is a 63 y.o. male  initially hospitalized on 9/10/2023, prolonged hospitalization for treatment of management of generalized weakness, deconditioning, with acute issues of diarrhea, C. difficile negative.  Diarrhea improved.  Had small hematoma after ambulating on his foot.  Unfortunately with exhaustive efforts to try to place this gentleman after 39 days of hospitalization, we are unable to find a facility that will accept him.  He has no further acute needs or requirements for inpatient monitoring and management, his labs and vitals are stable, he is tolerating oral intake, and has been refusing turns and repositionings and other interventions with nursing staff despite recommendations.  Patient discharged in hemodynamically stable condition on 10/19/2023 to follow-up with PCP within 1 week. Unfortunately we cannot solve all of his social issues during this hospitalization due to lack of resources and participation from the patient's perspective despite all our efforts.  Patient has a financial means of making arrangements at home.  Patient appealed his discharge.  Lost his appeal.  LCD: 10/20/23, PFL beginning: 10/21/23 @ 12pm.  Due to an inability to place the patient in a.m. nursing home he has been placed in our skilled nursing facility until arrangements can be made or until he is able to care for himself.      Interval Followup: Patient seen and examined resting comfortably is getting portions meals plus ordering door Dash patient refusing turns being difficult with staff.  Continues with diarrhea C. difficile checked returned positive discontinue Lomotil add oral vancomycin continue with probiotics discontinue Pepcid    Review of systems: All systems reviewed negative  septa following: Patient complains of generalized weakness fatigue and diarrhea  Objective   Objective     Vitals:   Temp:  [97.9 °F (36.6 °C)-98.3 °F (36.8 °C)] 97.9 °F (36.6 °C)  Heart Rate:  [73-84] 84  Resp:  [18] 18  BP: (120-123)/(59-61) 120/61    Physical Exam   Constitutional: Awake alert oriented no acute distress  Respiratory: Clear  GI: Abdomen soft obese nontender  Cardiovascular RRR    Result Review    Result Review:  I have personally reviewed the results from the time of this admission to 11/13/2023 13:14 EST and agree with these findings:  []  Laboratory  []  Microbiology  []  Radiology  []  EKG/Telemetry   []  Cardiology/Vascular   []  Pathology  []  Old records  []  Other:    Assessment & Plan   Assessment / Plan   Assessment:  Hospital-acquired weakness  C. difficile diarrhea  Generalized weakness  Deconditioning  Type 2 diabetes mellitus (Kami Garcia and PCP)  Essential hypertension  Chronic paroxysmal atrial fibrillation on Eliquis (previously seen Dr. 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    CODE STATUS:   Code Status (Patient has no pulse and is not breathing): CPR (Attempt to Resuscitate)  Medical Interventions (Patient has pulse or is breathing): Full Support        Electronically signed by HUSAM Jacobsen, 11/13/23, 1:14 PM EST.

## 2023-11-13 NOTE — PLAN OF CARE
Goal Outcome Evaluation:  Plan of Care Reviewed With: patient        Progress: no change  Outcome Evaluation: Resident alert, oriented, able to make needs known to staff. Not able to transfer or stand at this time. Sat at side of bed with therapy today. Mdiceated for prn pain x1, prn muscle spasms x1, effective. Blood glucoseelevated for BF and dinner, covered with sliding scale. stable at lunch. Continues to order door dash to subsidize meal intake. Education provided on diabetic diet. Resident tested positive for C-diff today, isolation implemented. po vancomycin started today. Dressing changed to rt foot as ordered. resident tolerated well. Resident pleasant and cooperative.

## 2023-11-13 NOTE — PLAN OF CARE
Goal Outcome Evaluation:  Plan of Care Reviewed With: patient        Progress: no change  Outcome Evaluation: Alert and oriented; able to make needs known to staff. Continues to refuse turns but will assist staff after incontinent episodes for yonis care. Incontinent of soft brown stool; no diarrhea this shift. Medicated with Norco and Baclofen for left hip pain as needed; see MAR. Call light within reach; care plan ongoing.

## 2023-11-14 LAB
GLUCOSE BLDC GLUCOMTR-MCNC: 102 MG/DL (ref 70–99)
GLUCOSE BLDC GLUCOMTR-MCNC: 121 MG/DL (ref 70–99)
GLUCOSE BLDC GLUCOMTR-MCNC: 146 MG/DL (ref 70–99)
GLUCOSE BLDC GLUCOMTR-MCNC: 221 MG/DL (ref 70–99)

## 2023-11-14 PROCEDURE — 97110 THERAPEUTIC EXERCISES: CPT

## 2023-11-14 PROCEDURE — 63710000001 INSULIN DETEMIR PER 5 UNITS: Performed by: PHYSICIAN ASSISTANT

## 2023-11-14 PROCEDURE — 82948 REAGENT STRIP/BLOOD GLUCOSE: CPT

## 2023-11-14 PROCEDURE — 63710000001 INSULIN LISPRO (HUMAN) PER 5 UNITS: Performed by: INTERNAL MEDICINE

## 2023-11-14 PROCEDURE — 97530 THERAPEUTIC ACTIVITIES: CPT

## 2023-11-14 PROCEDURE — 63710000001 INSULIN DETEMIR PER 5 UNITS: Performed by: INTERNAL MEDICINE

## 2023-11-14 RX ADMIN — ATORVASTATIN CALCIUM 10 MG: 10 TABLET, FILM COATED ORAL at 20:03

## 2023-11-14 RX ADMIN — HYDROCODONE BITARTRATE AND ACETAMINOPHEN 1 TABLET: 7.5; 325 TABLET ORAL at 21:53

## 2023-11-14 RX ADMIN — INSULIN LISPRO 8 UNITS: 100 INJECTION, SOLUTION INTRAVENOUS; SUBCUTANEOUS at 20:03

## 2023-11-14 RX ADMIN — PREGABALIN 100 MG: 100 CAPSULE ORAL at 08:55

## 2023-11-14 RX ADMIN — INSULIN DETEMIR 70 UNITS: 100 INJECTION, SOLUTION SUBCUTANEOUS at 10:13

## 2023-11-14 RX ADMIN — Medication 250 MG: at 20:03

## 2023-11-14 RX ADMIN — APIXABAN 5 MG: 5 TABLET, FILM COATED ORAL at 08:53

## 2023-11-14 RX ADMIN — LIDOCAINE 1 PATCH: 4 PATCH TOPICAL at 08:54

## 2023-11-14 RX ADMIN — EMPAGLIFLOZIN 10 MG: 10 TABLET, FILM COATED ORAL at 08:53

## 2023-11-14 RX ADMIN — PREGABALIN 100 MG: 100 CAPSULE ORAL at 17:08

## 2023-11-14 RX ADMIN — VANCOMYCIN HYDROCHLORIDE 125 MG: 125 CAPSULE ORAL at 12:43

## 2023-11-14 RX ADMIN — LISINOPRIL 2.5 MG: 2.5 TABLET ORAL at 08:59

## 2023-11-14 RX ADMIN — BACLOFEN 10 MG: 10 TABLET ORAL at 12:43

## 2023-11-14 RX ADMIN — VANCOMYCIN HYDROCHLORIDE 125 MG: 125 CAPSULE ORAL at 17:08

## 2023-11-14 RX ADMIN — VANCOMYCIN HYDROCHLORIDE 125 MG: 125 CAPSULE ORAL at 05:54

## 2023-11-14 RX ADMIN — BACLOFEN 10 MG: 10 TABLET ORAL at 20:03

## 2023-11-14 RX ADMIN — PREGABALIN 100 MG: 100 CAPSULE ORAL at 20:03

## 2023-11-14 RX ADMIN — Medication: at 09:00

## 2023-11-14 RX ADMIN — CYANOCOBALAMIN TAB 500 MCG 1000 MCG: 500 TAB at 08:56

## 2023-11-14 RX ADMIN — Medication 250 MG: at 08:56

## 2023-11-14 RX ADMIN — APIXABAN 5 MG: 5 TABLET, FILM COATED ORAL at 20:03

## 2023-11-14 RX ADMIN — INSULIN DETEMIR 60 UNITS: 100 INJECTION, SOLUTION SUBCUTANEOUS at 20:03

## 2023-11-14 NOTE — SIGNIFICANT NOTE
Wound Eval / Progress Noted     Angelica     Patient Name: Jose Shaikh  : 1960  MRN: [id number]  Today's Date: 2023                 Admit Date: 2023    Visit Dx:    ICD-10-CM ICD-9-CM   1. Difficulty in walking  R26.2 719.7         Debility        Past Medical History:   Diagnosis Date    Absence of toe of right foot     Acute osteomyelitis of left calcaneus  2021    Anxiety and depression     Arthritis     Cancer     Claustrophobia     Corns and callus     Diabetic ulcer of left heel associated with type 2 DM 2021    Diabetic ulcer of left heel associated with type 2 DM 2021    Diabetic ulcer of right midfoot associated with type 2 DM 2021    Difficulty walking     Essential hypertension 2021    Hammertoe     Hyperlipidemia LDL goal <100 2021    Ingrown toenail     Obesity     Paroxysmal atrial fibrillation 2021    Polyneuropathy     Pressure ulcer, stage 1     Tinea unguium     Type 2 diabetes mellitus with polyneuropathy         Past Surgical History:   Procedure Laterality Date    CYST REMOVAL      center of back; benign    EYE SURGERY      INCISION AND DRAINAGE ABSCESS      back    INCISION AND DRAINAGE LEG Right 12/10/2021    Procedure: INCISION AND DRAINAGE LOWER EXTREMITY;  Surgeon: Ash Leyva DPM;  Location: [facility] OR;  Service: Podiatry;  Laterality: Right;    OTHER SURGICAL HISTORY      Surgical clips left foot    TOE SURGERY Right     Removal of 5th toe    TRANS METATARSAL AMPUTATION Right 2021    Procedure: AMPUTATION TRANS METATARSAL;  Surgeon: Ash Leyva DPM;  Location: [facility] OR;  Service: Podiatry;  Laterality: Right;    VASCULAR SURGERY      WRIST SURGERY Left     repair of injury         Physical Assessment:  Wound 23 1240 Right anterior foot (Active)   Wound Image   23 0954   Dressing Appearance dry;intact 23 0954   Closure None 23 0954   Base bleeding;moist;red  11/14/23 0954   Periwound dry;intact;pink 11/14/23 0954   Periwound Temperature warm 11/14/23 0954   Periwound Skin Turgor soft 11/14/23 0954   Edges open 11/14/23 0954   Drainage Characteristics/Odor bleeding controlled;serosanguineous 11/14/23 0954   Drainage Amount small 11/14/23 0954   Care, Wound cleansed with;sterile normal saline 11/14/23 0954   Dressing Care dressing applied;petroleum-based;non-adherent;gauze;gauze, dry 11/14/23 0954   Periwound Care absorptive dressing applied 11/14/23 0954         Wound Check / Follow-up: Patient seen today for wound follow-up and dressing change. Patient remains with skin tear to right distal foot status post fall. Moist red wound base present. Small amount of bleeding noted. Cleansed with normal saline and gauze. Applied non-adherent petroleum based gauze and covered with dry gauze then secured with gauze roll. Recommending to continue to follow skin tear protocol.       Impression: Skin tear to right distal foot.      Short term goals: Regain skin integrity, skin protection, skin tear protocol.        Dottie Guevara RN    11/14/2023    13:04 EST

## 2023-11-14 NOTE — PLAN OF CARE
"Goal Outcome Evaluation:  Plan of Care Reviewed With: patient        Progress: no change  Outcome Evaluation: Alert and oriented x4; able to call for assist as needed. Continues to be incontinent of stool; yonis area and buttock remain very red but blanches. Refuses Q 2hr turns but assists in turning to be cleaned up. Received Baclofen and Bailey per request for left hip and leg pain. Utilizes trapeze bar to move around in bed; but has not been OOB this shift. Verbalizes being unhappy about having \"double portions\" discontinued. Insulin administered per sliding scale for bedtime blood sugar of 215 in addition to scheduled Levemir. Call light within reach; care plan ongoing.         "

## 2023-11-14 NOTE — THERAPY TREATMENT NOTE
Patient Name: Jose Shaikh  : 1960    MRN: [id number]                              Today's Date: 2023       Admit Date: 2023    Visit Dx:     ICD-10-CM ICD-9-CM   1. Difficulty in walking  R26.2 719.7     Patient Active Problem List   Diagnosis    Diabetic ulcer of left heel associated with type 2 DM    Acute osteomyelitis of left calcaneus     Diabetic ulcer of left heel associated with type 2 DM    Diabetic ulcer of right midfoot associated with type 2 DM    Paroxysmal atrial fibrillation    Essential hypertension    Hyperlipidemia LDL goal <100    Cellulitis and abscess of foot    High alkaline phosphatase level    Osteomyelitis    Onychomycosis    Onychocryptosis    Foot pain, bilateral    Osteomyelitis of foot, right, acute    Cellulitis of right foot    Type 2 diabetes mellitus, with long-term current use of insulin    Class 3 severe obesity due to excess calories with serious comorbidity and body mass index (BMI) of 45.0 to 49.9 in adult    Anxiety disorder, unspecified    Claustrophobia    Dependence on wheelchair    Depression, unspecified    Long term (current) use of anticoagulants    Long term (current) use of oral hypoglycemic drugs    Wound of foot    Non-prs chronic ulcer oth prt r foot limited to brkdwn skin    Orthostatic hypotension    Other chronic osteomyelitis, right ankle and foot    Personal history of nicotine dependence    Thrombocytopenia, unspecified    Unspecified open wound, right foot, initial encounter    Diabetic foot infection    Subacute osteomyelitis of right foot    Right foot pain    Sepsis    Onychomycosis    Foot pain, left    Impaired mobility and ADLs    Absence of toe of right foot    Corns and callosity    Disability of walking    Fracture    Limb swelling    Polyneuropathy    Pressure ulcer, stage 1    Shortness of breath    Generalized weakness    Debility     Past Medical History:   Diagnosis Date    Absence of toe of right foot     Acute  osteomyelitis of left calcaneus  08/18/2021    Anxiety and depression     Arthritis     Cancer     Claustrophobia     Corns and callus     Diabetic ulcer of left heel associated with type 2 DM 08/18/2021    Diabetic ulcer of left heel associated with type 2 DM 07/06/2021    Diabetic ulcer of right midfoot associated with type 2 DM 08/18/2021    Difficulty walking     Essential hypertension 08/31/2021    Hammertoe     Hyperlipidemia LDL goal <100 08/31/2021    Ingrown toenail     Obesity     Paroxysmal atrial fibrillation 08/31/2021    Polyneuropathy     Pressure ulcer, stage 1     Tinea unguium     Type 2 diabetes mellitus with polyneuropathy      Past Surgical History:   Procedure Laterality Date    CYST REMOVAL      center of back; benign    EYE SURGERY      INCISION AND DRAINAGE ABSCESS      back    INCISION AND DRAINAGE LEG Right 12/10/2021    Procedure: INCISION AND DRAINAGE LOWER EXTREMITY;  Surgeon: Ash Leyva DPM;  Location: [facility];  Service: Podiatry;  Laterality: Right;    OTHER SURGICAL HISTORY      Surgical clips left foot    TOE SURGERY Right     Removal of 5th toe    TRANS METATARSAL AMPUTATION Right 12/02/2021    Procedure: AMPUTATION TRANS METATARSAL;  Surgeon: Ash Leyva DPM;  Location: [facility] OR;  Service: Podiatry;  Laterality: Right;    VASCULAR SURGERY      WRIST SURGERY Left     repair of injury      General Information       Row Name 11/14/23 1300          OT Time and Intention    Document Type therapy note (daily note)  -PG     Mode of Treatment individual therapy;occupational therapy  -PG               User Key  (r) = Recorded By, (t) = Taken By, (c) = Cosigned By      Initials Name Provider Type    PG Kodak Matos OT Occupational Therapist                     Mobility/ADL's       Row Name 11/14/23 1301          Sit-Stand Transfer    Sit-Stand [place] (Transfers) moderate assist (50% patient effort);2 person assist;verbal cues  -PG       Row Name  11/14/23 1301          Stand-Sit Transfer    Stand-Sit [place] (Transfers) moderate assist (50% patient effort);2 person assist;verbal cues  -PG     Assistive Device (Stand-Sit Transfers) walker, front-wheeled  -PG               User Key  (r) = Recorded By, (t) = Taken By, (c) = Cosigned By      Initials Name Provider Type    Kodak Velazco OT Occupational Therapist                   Obj/Interventions       Row Name 11/14/23 1301          Shoulder (Therapeutic Exercise)    Shoulder Strengthening (Therapeutic Exercise) 4 lb free weight;resistance band;green;20 repititions  -PG       Row Name 11/14/23 1301          Elbow/Forearm (Therapeutic Exercise)    Elbow/Forearm Strengthening (Therapeutic Exercise) 4 lb free weight;green;20 repititions;resistance band  -PG       Row Name 11/14/23 1301          Motor Skills    Therapeutic Exercise shoulder;elbow/forearm  -PG               User Key  (r) = Recorded By, (t) = Taken By, (c) = Cosigned By      Initials Name Provider Type    PG Kodak Matos OT Occupational Therapist                   Goals/Plan    No documentation.                  Clinical Impression       Row Name 11/14/23 1302          Plan of Care Review    Progress no change  -PG               User Key  (r) = Recorded By, (t) = Taken By, (c) = Cosigned By      Initials Name Provider Type    PG Kodak Matos OT Occupational Therapist                   Outcome Measures       Row Name 11/14/23 1302          How much help from another is currently needed...    Putting on and taking off regular lower body clothing? 1  -PG     Bathing (including washing, rinsing, and drying) 2  -PG     Toileting (which includes using toilet bed pan or urinal) 1  -PG     Putting on and taking off regular upper body clothing 3  -PG     Taking care of personal grooming (such as brushing teeth) 3  -PG     Eating meals 3  -PG     AM-PAC 6 Clicks Score (OT) 13  -PG       Row Name 11/14/23 9459          How much help from another  person do you currently need...    Turning from your back to your side while in flat bed without using bedrails? 2  -WM     Moving from lying on back to sitting on the side of a flat bed without bedrails? 2  -WM     Moving to and from a bed to a chair (including a wheelchair)? 1  -WM     Standing up from a chair using your arms (e.g., wheelchair, bedside chair)? 1  -WM     Climbing 3-5 steps with a railing? 1  -WM     To walk in hospital room? 1  -WM     AM-PAC 6 Clicks Score (PT) 8  -WM     Highest Level of Mobility Goal 3 --> Sit at edge of bed  -WM       Row Name 11/14/23 1302          Functional Assessment    Outcome Measure Options AM-PAC 6 Clicks Daily Activity (OT);Optimal Instrument  -PG       Row Name 11/14/23 1302          Optimal Instrument    Optimal Instrument Optimal - 3  -PG     Bending/Stooping 4  -PG     Standing 4  -PG     Reaching 1  -PG               User Key  (r) = Recorded By, (t) = Taken By, (c) = Cosigned By      Initials Name Provider Type     Carson Inman PTA Physical Therapist Assistant    PG Kodak Matos OT Occupational Therapist                    Occupational Therapy Education       Title: PT OT SLP Therapies (Done)       Topic: Occupational Therapy (Done)       Point: ADL training (Done)       Description:   Instruct learner(s) on proper safety adaptation and remediation techniques during self care or transfers.   Instruct in proper use of assistive devices.                  Learning Progress Summary             Patient Acceptance, E,D, DU by PG at 11/7/2023 0932                         Point: Home exercise program (Done)       Description:   Instruct learner(s) on appropriate technique for monitoring, assisting and/or progressing therapeutic exercises/activities.                  Learning Progress Summary             Patient Acceptance, E,D, DU by PG at 11/7/2023 0932                         Point: Precautions (Done)       Description:   Instruct learner(s) on prescribed  precautions during self-care and functional transfers.                  Learning Progress Summary             Patient Acceptance, E,D, DU by PG at 11/7/2023 0932                         Point: Body mechanics (Done)       Description:   Instruct learner(s) on proper positioning and spine alignment during self-care, functional mobility activities and/or exercises.                  Learning Progress Summary             Patient Acceptance, E,D, DU by PG at 11/7/2023 0932                                         User Key       Initials Effective Dates Name Provider Type Discipline    PG 06/16/21 -  Kodak Matos OT Occupational Therapist OT                  OT Recommendation and Plan  Planned Therapy Interventions (OT): activity tolerance training, BADL retraining, strengthening exercise, transfer/mobility retraining, patient/caregiver education/training, occupation/activity based interventions  Therapy Frequency (OT): 5 times/wk  Plan of Care Review  Plan of Care Reviewed With: patient  Progress: no change  Outcome Evaluation: Patient presents with limitations affecting strength, activity tolerance, and balance impacting patient's ability to return home safely and independently.  The skills of a therapist will be required to safely and effectively implement the following treatment plan to restore maximal level of function     Time Calculation:   Evaluation Complexity (OT)  Review Occupational Profile/Medical/Therapy History Complexity: brief/low complexity  Assessment, Occupational Performance/Identification of Deficit Complexity: 3-5 performance deficits  Overall Complexity of Evaluation (OT): low complexity     Time Calculation- OT       Row Name 11/14/23 1303             Time Calculation- OT    OT Received On 11/14/23  -PG      OT Goal Re-Cert Due Date 11/27/23  -PG         Timed Charges    65380 - OT Therapeutic Exercise Minutes 35  -PG      55274 - OT Therapeutic Activity Minutes 20  -PG         SNF Occupational  Therapy Minutes    Skilled Minutes- OT 55 min  -PG         Total Minutes    Timed Charges Total Minutes 55  -PG       Total Minutes 55  -PG                User Key  (r) = Recorded By, (t) = Taken By, (c) = Cosigned By      Initials Name Provider Type    PG Kodak Matos OT Occupational Therapist                  Therapy Charges for Today       Code Description Service Date Service Provider Modifiers Qty    20125526901 HC OT THER PROC EA 15 MIN 11/13/2023 Kodak Matos OT GO 2    88240799738 HC OT THERAPEUTIC ACT EA 15 MIN 11/13/2023 Kodak Matos OT GO 1    44660982906 HC OT THER PROC EA 15 MIN 11/14/2023 Kodak Matos OT GO 3    59348273047 HC OT THERAPEUTIC ACT EA 15 MIN 11/14/2023 Kodak Matos OT GO 1                 Kodak Matos OT  11/14/2023

## 2023-11-14 NOTE — THERAPY TREATMENT NOTE
SNF - Physical Therapy Treatment Note  KE[facility] Dominguez     Patient Name: Jose Shaikh  : 1960  MRN: [id number]  Today's Date: 2023      Visit Dx:    ICD-10-CM ICD-9-CM   1. Difficulty in walking  R26.2 719.7     Patient Active Problem List   Diagnosis    Diabetic ulcer of left heel associated with type 2 DM    Acute osteomyelitis of left calcaneus     Diabetic ulcer of left heel associated with type 2 DM    Diabetic ulcer of right midfoot associated with type 2 DM    Paroxysmal atrial fibrillation    Essential hypertension    Hyperlipidemia LDL goal <100    Cellulitis and abscess of foot    High alkaline phosphatase level    Osteomyelitis    Onychomycosis    Onychocryptosis    Foot pain, bilateral    Osteomyelitis of foot, right, acute    Cellulitis of right foot    Type 2 diabetes mellitus, with long-term current use of insulin    Class 3 severe obesity due to excess calories with serious comorbidity and body mass index (BMI) of 45.0 to 49.9 in adult    Anxiety disorder, unspecified    Claustrophobia    Dependence on wheelchair    Depression, unspecified    Long term (current) use of anticoagulants    Long term (current) use of oral hypoglycemic drugs    Wound of foot    Non-prs chronic ulcer oth prt r foot limited to brkdwn skin    Orthostatic hypotension    Other chronic osteomyelitis, right ankle and foot    Personal history of nicotine dependence    Thrombocytopenia, unspecified    Unspecified open wound, right foot, initial encounter    Diabetic foot infection    Subacute osteomyelitis of right foot    Right foot pain    Sepsis    Onychomycosis    Foot pain, left    Impaired mobility and ADLs    Absence of toe of right foot    Corns and callosity    Disability of walking    Fracture    Limb swelling    Polyneuropathy    Pressure ulcer, stage 1    Shortness of breath    Generalized weakness    Debility     Past Medical History:   Diagnosis Date    Absence of toe of right foot     Acute osteomyelitis  of left calcaneus  08/18/2021    Anxiety and depression     Arthritis     Cancer     Claustrophobia     Corns and callus     Diabetic ulcer of left heel associated with type 2 DM 08/18/2021    Diabetic ulcer of left heel associated with type 2 DM 07/06/2021    Diabetic ulcer of right midfoot associated with type 2 DM 08/18/2021    Difficulty walking     Essential hypertension 08/31/2021    Hammertoe     Hyperlipidemia LDL goal <100 08/31/2021    Ingrown toenail     Obesity     Paroxysmal atrial fibrillation 08/31/2021    Polyneuropathy     Pressure ulcer, stage 1     Tinea unguium     Type 2 diabetes mellitus with polyneuropathy      Past Surgical History:   Procedure Laterality Date    CYST REMOVAL      center of back; benign    EYE SURGERY      INCISION AND DRAINAGE ABSCESS      back    INCISION AND DRAINAGE LEG Right 12/10/2021    Procedure: INCISION AND DRAINAGE LOWER EXTREMITY;  Surgeon: Ash Leyva DPM;  Location: [facility];  Service: Podiatry;  Laterality: Right;    OTHER SURGICAL HISTORY      Surgical clips left foot    TOE SURGERY Right     Removal of 5th toe    TRANS METATARSAL AMPUTATION Right 12/02/2021    Procedure: AMPUTATION TRANS METATARSAL;  Surgeon: Ash Leyva DPM;  Location: [facility];  Service: Podiatry;  Laterality: Right;    VASCULAR SURGERY      WRIST SURGERY Left     repair of injury       PT Assessment (last 12 hours)       PT Evaluation and Treatment       Row Name 11/14/23 1230          Physical Therapy Time and Intention    Subjective Information complains of;pain  -WM     Document Type therapy note (daily note)  -WM     Mode of Treatment individual therapy;physical therapy  -WM     Patient Effort good  -WM     Symptoms Noted During/After Treatment fatigue  -WM       Row Name 11/14/23 1230          Pain Scale: FACES Pre/Post-Treatment    Pain: FACES Scale, Pretreatment 6-->hurts even more  -WM     Posttreatment Pain Rating 6-->hurts even more  -WM      Pain Location - Side/Orientation Left  -     Pain Location - hip;knee  -       Row Name 11/14/23 1230          Bed Mobility    Supine-Sit-Supine [place] (Bed Mobility) moderate assist (50% patient effort);2 person assist;verbal cues  -     Bed Mobility, Safety Issues decreased use of legs for bridging/pushing  -     Assistive Device (Bed Mobility) bed rails;draw sheet;head of bed elevated;leg ;overhead trapeze  -       Row Name 11/14/23 1230          Sit-Stand Transfer    Sit-Stand [place] (Transfers) moderate assist (50% patient effort);2 person assist;verbal cues  -     Assistive Device (Sit-Stand Transfers) walker, front-wheeled  -     Comment, (Sit-Stand Transfer) Pt stood  x 4 with longest attempt lasting 15 seconds  -       Row Name 11/14/23 1230          Stand-Sit Transfer    Stand-Sit [place] (Transfers) moderate assist (50% patient effort);2 person assist;verbal cues  -     Assistive Device (Stand-Sit Transfers) walker, front-wheeled  -       Row Name 11/14/23 1230          Safety Issues, Functional Mobility    Impairments Affecting Function (Mobility) balance;endurance/activity tolerance;pain;range of motion (ROM);strength  -       Row Name 11/14/23 1230          Hip (Therapeutic Exercise)    Hip AAROM (Therapeutic Exercise) bilateral;flexion;extension;aBduction;aDduction;supine;10 repetitions;2 sets  -       Row Name 11/14/23 1230          Knee (Therapeutic Exercise)    Knee AROM (Therapeutic Exercise) bilateral;SAQ (short arc quad);supine;20 repititions  -     Knee Isometrics (Therapeutic Exercise) bilateral;quad sets;supine;10 repetitions;3 second hold;2 sets  -     Knee Strengthening (Therapeutic Exercise) bilateral;SLR (straight leg raise);supine;20 repititions  Active-assisted  -       Row Name 11/14/23 1230          Ankle (Therapeutic Exercise)    Ankle AROM (Therapeutic Exercise) bilateral;dorsiflexion;plantarflexion;supine;20 repititions  -        Row Name             Wound 11/01/23 1312 Left anterior second toe Blisters    Wound - Properties Group Placement Date: 11/01/23  -RASHARD Placement Time: 1312  -RASHARD Present on Original Admission: N  -RASHARD Side: Left  -RASHARD Orientation: anterior  -RASHARD Location: second toe  -RASHARD Primary Wound Type: Blisters  -RASHARD    Retired Wound - Properties Group Placement Date: 11/01/23  -RASHARD Placement Time: 1312  -RASHARD Present on Original Admission: N  -RASHARD Side: Left  -RASHARD Orientation: anterior  -RASHARD Location: second toe  -RASHARD Primary Wound Type: Blisters  -RASHARD    Retired Wound - Properties Group Date first assessed: 11/01/23  -RASHARD Time first assessed: 1312  -RASHARD Present on Original Admission: N  -RASHARD Side: Left  -RASHARD Location: second toe  -RASHARD Primary Wound Type: Blisters  -RASHARD      Row Name             Wound 11/06/23 1611 Left posterior heel Other (comment)    Wound - Properties Group Placement Date: 11/06/23  -FH Placement Time: 1611  -FH Side: Left  -FH Orientation: posterior  -FH Location: heel  -FH Primary Wound Type: Other  -FH, redness     Retired Wound - Properties Group Placement Date: 11/06/23  - Placement Time: 1611  -FH Side: Left  -FH Orientation: posterior  -FH Location: heel  -FH Primary Wound Type: Other  -FH, redness     Retired Wound - Properties Group Date first assessed: 11/06/23  - Time first assessed: 1611  -FH Side: Left  -FH Location: heel  -FH Primary Wound Type: Other  -FH, redness       Row Name             Wound 10/17/23 0546 Bilateral perirectal MASD (Moisture associated skin damage)    Wound - Properties Group Placement Date: 10/17/23  -AS Placement Time: 0546  -AS Present on Original Admission: N  -AS Side: Bilateral  -AS Location: perirectal  -AS Primary Wound Type: MASD  -AS    Retired Wound - Properties Group Placement Date: 10/17/23  -AS Placement Time: 0546  -AS Present on Original Admission: N  -AS Side: Bilateral  -AS Location: perirectal  -AS Primary Wound Type: MASD  -AS    Retired Wound - Properties Group  Date first assessed: 10/17/23  -AS Time first assessed: 0546  -AS Present on Original Admission: N  -AS Side: Bilateral  -AS Location: perirectal  -AS Primary Wound Type: MASD  -AS      Row Name             Wound 11/07/23 1240 Right anterior foot    Wound - Properties Group Placement Date: 11/07/23  - Placement Time: 1240  -FH Side: Right  -FH Orientation: anterior  -FH Location: foot  -FH    Retired Wound - Properties Group Placement Date: 11/07/23  - Placement Time: 1240  -FH Side: Right  -FH Orientation: anterior  -FH Location: foot  -FH    Retired Wound - Properties Group Date first assessed: 11/07/23  - Time first assessed: 1240  -FH Side: Right  -FH Location: foot  -FH      Row Name             Wound 11/07/23 1238 Left lower arm    Wound - Properties Group Placement Date: 11/07/23  - Placement Time: 1238  -FH Side: Left  -FH Orientation: lower  -FH Location: arm  -FH    Retired Wound - Properties Group Placement Date: 11/07/23  - Placement Time: 1238  -FH Side: Left  -FH Orientation: lower  -FH Location: arm  -FH    Retired Wound - Properties Group Date first assessed: 11/07/23  - Time first assessed: 1238  -FH Side: Left  -FH Location: arm  -FH      Row Name 11/14/23 1230          Positioning and Restraints    Pre-Treatment Position in bed  -WM     Post Treatment Position bed  -WM     In Bed fowlers;call light within reach;encouraged to call for assist  -WM       Row Name 11/14/23 1230          Progress Summary (PT)    Progress Toward Functional Goals (PT) progress toward functional goals is fair  -WM               User Key  (r) = Recorded By, (t) = Taken By, (c) = Cosigned By      Initials Name Provider Type    Karon Jordan, RN Registered Nurse     Carlos Cagle RN Registered Nurse    Carson Johnson PTA Physical Therapist Assistant    AS Angelina Alfredo RN Registered Nurse                  Section G              Section GG                       Physical Therapy Education        Title: PT OT SLP Therapies (Done)       Topic: Physical Therapy (Done)       Point: Mobility training (Done)       Learning Progress Summary             Patient Acceptance, E,TB, VU by MOE at 11/8/2023 1341                         Point: Precautions (Done)       Learning Progress Summary             Patient Acceptance, E,TB, VU by MOE at 11/8/2023 1341                                         User Key       Initials Effective Dates Name Provider Type Discipline    MOE 06/03/21 -  Merlin Rao PT Physical Therapist PT                  PT Recommendation and Plan     Progress Summary (PT)  Progress Toward Functional Goals (PT): progress toward functional goals is fair  Daily Progress Summary (PT): Pt attempted to stand x 4, but was not able to fully extend his knees.   Outcome Measures       Row Name 11/14/23 1238 11/13/23 1430          How much help from another person do you currently need...    Turning from your back to your side while in flat bed without using bedrails? 2  -WM 2  -WM     Moving from lying on back to sitting on the side of a flat bed without bedrails? 2  -WM 2  -WM     Moving to and from a bed to a chair (including a wheelchair)? 1  -WM 1  -WM     Standing up from a chair using your arms (e.g., wheelchair, bedside chair)? 1  -WM 1  -WM     Climbing 3-5 steps with a railing? 1  -WM 1  -WM     To walk in hospital room? 1  -WM 1  -WM     AM-PAC 6 Clicks Score (PT) 8  -WM 8  -WM     Highest Level of Mobility Goal 3 --> Sit at edge of bed  -WM 3 --> Sat at edge of bed  -WM               User Key  (r) = Recorded By, (t) = Taken By, (c) = Cosigned By      Initials Name Provider Type    WM Carson Inman PTA Physical Therapist Assistant                     Time Calculation:    PT Charges       Row Name 11/14/23 1229             Time Calculation    PT Received On 11/14/23  -WM         Timed Charges    36556 - PT Therapeutic Exercise Minutes 16  -WM      11405 - PT Therapeutic Activity Minutes 23   -WM         SNF Physical Therapy Minutes    Skilled Minutes- PT 39 min  -WM         Total Minutes    Timed Charges Total Minutes 39  -WM       Total Minutes 39  -WM                User Key  (r) = Recorded By, (t) = Taken By, (c) = Cosigned By      Initials Name Provider Type    Carson Johnson PTA Physical Therapist Assistant                  Therapy Charges for Today       Code Description Service Date Service Provider Modifiers Qty    [id number]  PT THER PROC EA 15 MIN 11/13/2023 Carson Inman PTA GP 1    [id number]  PT THERAPEUTIC ACT EA 15 MIN 11/13/2023 Carson Inman PTA GP 2            PT G-Codes  Outcome Measure Options: AM-PAC 6 Clicks Daily Activity (OT), Optimal Instrument  AM-PAC 6 Clicks Score (PT): 8  AM-PAC 6 Clicks Score (OT): 14    Carson Inman PTA  11/14/2023

## 2023-11-15 LAB
GLUCOSE BLDC GLUCOMTR-MCNC: 110 MG/DL (ref 70–99)
GLUCOSE BLDC GLUCOMTR-MCNC: 114 MG/DL (ref 70–99)
GLUCOSE BLDC GLUCOMTR-MCNC: 184 MG/DL (ref 70–99)
GLUCOSE BLDC GLUCOMTR-MCNC: 198 MG/DL (ref 70–99)
INDURATION: 0 MM (ref 0–10)
Lab: NORMAL
Lab: NORMAL
TB SKIN TEST: NEGATIVE

## 2023-11-15 PROCEDURE — 99309 SBSQ NF CARE MODERATE MDM 30: CPT | Performed by: PHYSICIAN ASSISTANT

## 2023-11-15 PROCEDURE — 97530 THERAPEUTIC ACTIVITIES: CPT

## 2023-11-15 PROCEDURE — 97110 THERAPEUTIC EXERCISES: CPT

## 2023-11-15 PROCEDURE — 82948 REAGENT STRIP/BLOOD GLUCOSE: CPT

## 2023-11-15 PROCEDURE — 63710000001 INSULIN LISPRO (HUMAN) PER 5 UNITS: Performed by: INTERNAL MEDICINE

## 2023-11-15 PROCEDURE — 63710000001 INSULIN DETEMIR PER 5 UNITS: Performed by: PHYSICIAN ASSISTANT

## 2023-11-15 RX ADMIN — PREGABALIN 100 MG: 100 CAPSULE ORAL at 09:38

## 2023-11-15 RX ADMIN — INSULIN DETEMIR 70 UNITS: 100 INJECTION, SOLUTION SUBCUTANEOUS at 09:39

## 2023-11-15 RX ADMIN — HYDROCODONE BITARTRATE AND ACETAMINOPHEN 1 TABLET: 7.5; 325 TABLET ORAL at 11:40

## 2023-11-15 RX ADMIN — BACLOFEN 10 MG: 10 TABLET ORAL at 07:25

## 2023-11-15 RX ADMIN — APIXABAN 5 MG: 5 TABLET, FILM COATED ORAL at 20:06

## 2023-11-15 RX ADMIN — ATORVASTATIN CALCIUM 10 MG: 10 TABLET, FILM COATED ORAL at 20:06

## 2023-11-15 RX ADMIN — Medication 250 MG: at 09:38

## 2023-11-15 RX ADMIN — INSULIN LISPRO 4 UNITS: 100 INJECTION, SOLUTION INTRAVENOUS; SUBCUTANEOUS at 20:07

## 2023-11-15 RX ADMIN — EMPAGLIFLOZIN 10 MG: 10 TABLET, FILM COATED ORAL at 09:38

## 2023-11-15 RX ADMIN — HYDROCORTISONE 1 APPLICATION: 1 OINTMENT TOPICAL at 09:38

## 2023-11-15 RX ADMIN — PREGABALIN 100 MG: 100 CAPSULE ORAL at 20:06

## 2023-11-15 RX ADMIN — Medication 250 MG: at 20:06

## 2023-11-15 RX ADMIN — CYANOCOBALAMIN TAB 500 MCG 1000 MCG: 500 TAB at 09:38

## 2023-11-15 RX ADMIN — PREGABALIN 100 MG: 100 CAPSULE ORAL at 16:39

## 2023-11-15 RX ADMIN — LIDOCAINE 1 PATCH: 4 PATCH TOPICAL at 09:39

## 2023-11-15 RX ADMIN — VANCOMYCIN HYDROCHLORIDE 125 MG: 125 CAPSULE ORAL at 11:40

## 2023-11-15 RX ADMIN — INSULIN DETEMIR 60 UNITS: 100 INJECTION, SOLUTION SUBCUTANEOUS at 20:07

## 2023-11-15 RX ADMIN — BACLOFEN 10 MG: 10 TABLET ORAL at 17:15

## 2023-11-15 RX ADMIN — VANCOMYCIN HYDROCHLORIDE 125 MG: 125 CAPSULE ORAL at 18:09

## 2023-11-15 RX ADMIN — LISINOPRIL 2.5 MG: 2.5 TABLET ORAL at 09:38

## 2023-11-15 RX ADMIN — VANCOMYCIN HYDROCHLORIDE 125 MG: 125 CAPSULE ORAL at 23:49

## 2023-11-15 RX ADMIN — VANCOMYCIN HYDROCHLORIDE 125 MG: 125 CAPSULE ORAL at 00:02

## 2023-11-15 RX ADMIN — VANCOMYCIN HYDROCHLORIDE 125 MG: 125 CAPSULE ORAL at 05:08

## 2023-11-15 RX ADMIN — Medication: at 09:38

## 2023-11-15 RX ADMIN — INSULIN LISPRO 4 UNITS: 100 INJECTION, SOLUTION INTRAVENOUS; SUBCUTANEOUS at 12:06

## 2023-11-15 RX ADMIN — APIXABAN 5 MG: 5 TABLET, FILM COATED ORAL at 09:38

## 2023-11-15 NOTE — PROGRESS NOTES
[facility]   Hospitalist Progress Note  Date: 11/15/2023  Patient Name: Jose Shaikh  : 1960  MRN: [id number]  Date of admission: 2023      Subjective   Subjective     Chief Complaint: Weakness    Summary: Jose Shaikh is a 63 y.o. male  initially hospitalized on 9/10/2023, prolonged hospitalization for treatment of management of generalized weakness, deconditioning, with acute issues of diarrhea, C. difficile negative.  Diarrhea improved.  Had small hematoma after ambulating on his foot.  Unfortunately with exhaustive efforts to try to place this gentleman after 39 days of hospitalization, we are unable to find a facility that will accept him.  He has no further acute needs or requirements for inpatient monitoring and management, his labs and vitals are stable, he is tolerating oral intake, and has been refusing turns and repositionings and other interventions with nursing staff despite recommendations.  Patient discharged in hemodynamically stable condition on 10/19/2023 to follow-up with PCP within 1 week. Unfortunately we cannot solve all of his social issues during this hospitalization due to lack of resources and participation from the patient's perspective despite all our efforts.  Patient has a financial means of making arrangements at home.  Patient appealed his discharge.  Lost his appeal.  LCD: 10/20/23, PFL beginning: 10/21/23 @ 12pm.  Due to an inability to place the patient in a.m. nursing home he has been placed in our skilled nursing facility until arrangements can be made or until he is able to care for himself.      Interval Followup: 11/15/2023    Still has diarrhea  Tolerating oral vancomycin  Alert and oriented.  Conversational.  OK with current diet plan  Vital signs stable  Glucose controlled  Recent Cr/K+/Mg+ WNL    Review of systems: All systems reviewed negative septa following: Patient complains of generalized weakness fatigue and diarrhea  Objective   Objective      Vitals:   Temp:  [97.7 °F (36.5 °C)-98.1 °F (36.7 °C)] 97.7 °F (36.5 °C)  Heart Rate:  [91-93] 93  Resp:  [18] 18  BP: (117-129)/(59-80) 129/80    Physical Exam   Constitutional: Awake alert oriented no acute distress  Respiratory: No wheeze  GI: Abdomen: Obese  Neuro/psych:  Calm mood.  No dysarthria.    Result Review    Result Review:  I have personally reviewed the results from the time of this admission to 11/15/2023 17:07 EST and agree with these findings:  []  Laboratory  []  Microbiology  []  Radiology  []  EKG/Telemetry   []  Cardiology/Vascular   []  Pathology  []  Old records  []  Other:    Assessment & Plan   Assessment / Plan   Assessment:  Hospital-acquired weakness  C. difficile diarrhea  Generalized weakness  Deconditioning  Type 2 diabetes mellitus (Kami Garcia and PCP)  Essential hypertension  Chronic paroxysmal atrial fibrillation on Eliquis (previously seen Dr. Duval)  Morbid obesity with BMI 44  Debility with wheelchair dependence  Hx of severe left hip pain  Severe degenerative joint disease of lower extremities  Hx L calcaneus osteomyelitis  Hx R transmetatarsal  Chronic bilateral foot wounds with right transmetatarsal amputation, healing by secondary intention (wound care clinic; podiatrist Gordon)  Thrombocytopenia      Plan:    Continue oral vancomycin for C difficile associated diarrhea.  Monitor hemodynamics and avoid hypotension/dehydration.  Discontinue Lomotil  Continue with probiotics  Discontinue Pepcid  Continue other treatment as ordered  Encourage patient to participate with therapy  PT OT  Discussed with RN    DVT prophylaxis:  Medical DVT prophylaxis orders are present.    CODE STATUS:   Code Status (Patient has no pulse and is not breathing): CPR (Attempt to Resuscitate)  Medical Interventions (Patient has pulse or is breathing): Full Support

## 2023-11-15 NOTE — PLAN OF CARE
Goal Outcome Evaluation:              Outcome Evaluation: Plan of care continues at this time. Patient is alert and oriented x 4, using urinal, incontinent of soft stool, Margarita area and buttock red but blanchable, cleansed and barrier ointment applied. Patient does turn to be cleaned up with incontinence but refuses repositioning in bed to turn Q 2 hours, blood sugars monitored, Updated provider on blood sugar levels and reviewed insulin orders per provider. Dressing R LE changed by wound care nurse this am. Call light in reach, patient is able to make needs known and uses call light.

## 2023-11-15 NOTE — PLAN OF CARE
Goal Outcome Evaluation:  Plan of Care Reviewed With: patient        Progress: no change  Outcome Evaluation: Resident alert, oriented, able to make needs known to staff. Still not able to transfer with staff, however was able to stad with therapy next to bed. Medicated with prn baclofen x2, prn [facility] x1. per patient intervention is nto very effective but wants to continue taken these meds. c/o diet restrictions. States wants double portion of his proteins. States is not requesting double meals with carbs, just wants double meats and eggs. Dietitian notified and made changes, also asked if skylar could be added to aid in wound healing. ordered per dietitian effective today. Resident remains incontinent of stools, still very loose. Resident pleasant and coopertive.

## 2023-11-15 NOTE — THERAPY TREATMENT NOTE
Patient Name: Jose Shaikh  : 1960    MRN: [id number]                              Today's Date: 11/15/2023       Admit Date: 2023    Visit Dx:     ICD-10-CM ICD-9-CM   1. Difficulty in walking  R26.2 719.7     Patient Active Problem List   Diagnosis    Diabetic ulcer of left heel associated with type 2 DM    Acute osteomyelitis of left calcaneus     Diabetic ulcer of left heel associated with type 2 DM    Diabetic ulcer of right midfoot associated with type 2 DM    Paroxysmal atrial fibrillation    Essential hypertension    Hyperlipidemia LDL goal <100    Cellulitis and abscess of foot    High alkaline phosphatase level    Osteomyelitis    Onychomycosis    Onychocryptosis    Foot pain, bilateral    Osteomyelitis of foot, right, acute    Cellulitis of right foot    Type 2 diabetes mellitus, with long-term current use of insulin    Class 3 severe obesity due to excess calories with serious comorbidity and body mass index (BMI) of 45.0 to 49.9 in adult    Anxiety disorder, unspecified    Claustrophobia    Dependence on wheelchair    Depression, unspecified    Long term (current) use of anticoagulants    Long term (current) use of oral hypoglycemic drugs    Wound of foot    Non-prs chronic ulcer oth prt r foot limited to brkdwn skin    Orthostatic hypotension    Other chronic osteomyelitis, right ankle and foot    Personal history of nicotine dependence    Thrombocytopenia, unspecified    Unspecified open wound, right foot, initial encounter    Diabetic foot infection    Subacute osteomyelitis of right foot    Right foot pain    Sepsis    Onychomycosis    Foot pain, left    Impaired mobility and ADLs    Absence of toe of right foot    Corns and callosity    Disability of walking    Fracture    Limb swelling    Polyneuropathy    Pressure ulcer, stage 1    Shortness of breath    Generalized weakness    Debility     Past Medical History:   Diagnosis Date    Absence of toe of right foot     Acute  osteomyelitis of left calcaneus  08/18/2021    Anxiety and depression     Arthritis     Cancer     Claustrophobia     Corns and callus     Diabetic ulcer of left heel associated with type 2 DM 08/18/2021    Diabetic ulcer of left heel associated with type 2 DM 07/06/2021    Diabetic ulcer of right midfoot associated with type 2 DM 08/18/2021    Difficulty walking     Essential hypertension 08/31/2021    Hammertoe     Hyperlipidemia LDL goal <100 08/31/2021    Ingrown toenail     Obesity     Paroxysmal atrial fibrillation 08/31/2021    Polyneuropathy     Pressure ulcer, stage 1     Tinea unguium     Type 2 diabetes mellitus with polyneuropathy      Past Surgical History:   Procedure Laterality Date    CYST REMOVAL      center of back; benign    EYE SURGERY      INCISION AND DRAINAGE ABSCESS      back    INCISION AND DRAINAGE LEG Right 12/10/2021    Procedure: INCISION AND DRAINAGE LOWER EXTREMITY;  Surgeon: Ash Leyva DPM;  Location: [facility];  Service: Podiatry;  Laterality: Right;    OTHER SURGICAL HISTORY      Surgical clips left foot    TOE SURGERY Right     Removal of 5th toe    TRANS METATARSAL AMPUTATION Right 12/02/2021    Procedure: AMPUTATION TRANS METATARSAL;  Surgeon: Ash Leyva DPM;  Location: [facility] OR;  Service: Podiatry;  Laterality: Right;    VASCULAR SURGERY      WRIST SURGERY Left     repair of injury      General Information       Row Name 11/15/23 1102          OT Time and Intention    Document Type therapy note (daily note)  -PG     Mode of Treatment individual therapy;occupational therapy  -PG               User Key  (r) = Recorded By, (t) = Taken By, (c) = Cosigned By      Initials Name Provider Type    PG Kodak Matos OT Occupational Therapist                     Mobility/ADL's       Row Name 11/15/23 1103          Transfers    Transfers sit-stand transfer  -PG       Row Name 11/15/23 1103          Sit-Stand Transfer    Sit-Stand [place] (Transfers)  moderate assist (50% patient effort);2 person assist;verbal cues  -PG     Assistive Device (Sit-Stand Transfers) walker, front-wheeled  -PG               User Key  (r) = Recorded By, (t) = Taken By, (c) = Cosigned By      Initials Name Provider Type    Kodak Velazco OT Occupational Therapist                   Obj/Interventions       Row Name 11/15/23 1103          Shoulder (Therapeutic Exercise)    Shoulder Strengthening (Therapeutic Exercise) green;15 repititions;resistance band;4 lb free weight;2 sets  -PG       Row Name 11/15/23 1103          Elbow/Forearm (Therapeutic Exercise)    Elbow/Forearm Strengthening (Therapeutic Exercise) 4 lb free weight;20 repititions;resistance band  -PG       Row Name 11/15/23 1103          Motor Skills    Therapeutic Exercise shoulder;elbow/forearm  -PG               User Key  (r) = Recorded By, (t) = Taken By, (c) = Cosigned By      Initials Name Provider Type    Kodak Velazco, OT Occupational Therapist                   Goals/Plan    No documentation.                  Clinical Impression       Row Name 11/15/23 1104          Plan of Care Review    Progress no change  -PG               User Key  (r) = Recorded By, (t) = Taken By, (c) = Cosigned By      Initials Name Provider Type    Kodak Velazco OT Occupational Therapist                   Outcome Measures       Row Name 11/15/23 1104          How much help from another is currently needed...    Putting on and taking off regular lower body clothing? 1  -PG     Bathing (including washing, rinsing, and drying) 2  -PG     Toileting (which includes using toilet bed pan or urinal) 1  -PG     Putting on and taking off regular upper body clothing 3  -PG     Taking care of personal grooming (such as brushing teeth) 3  -PG     Eating meals 4  -PG     AM-PAC 6 Clicks Score (OT) 14  -PG       Row Name 11/15/23 0200          How much help from another person do you currently need...    Turning from your back to your side while in  flat bed without using bedrails? 2  -TM     Moving from lying on back to sitting on the side of a flat bed without bedrails? 2  -TM     Moving to and from a bed to a chair (including a wheelchair)? 1  -TM     Standing up from a chair using your arms (e.g., wheelchair, bedside chair)? 1  -TM     Climbing 3-5 steps with a railing? 1  -TM     To walk in hospital room? 1  -TM     AM-PAC 6 Clicks Score (PT) 8  -TM     Highest Level of Mobility Goal 3 --> Sit at edge of bed  -TM       Row Name 11/15/23 1104          Functional Assessment    Outcome Measure Options AM-PAC 6 Clicks Daily Activity (OT);Optimal Instrument  -PG       Row Name 11/15/23 1104          Optimal Instrument    Optimal Instrument Optimal - 3  -PG     Bending/Stooping 4  -PG     Standing 4  -PG     Reaching 2  -PG               User Key  (r) = Recorded By, (t) = Taken By, (c) = Cosigned By      Initials Name Provider Type    TM Rylee Orellana, RN Registered Nurse    PG Kodak Matos OT Occupational Therapist                    Occupational Therapy Education       Title: PT OT SLP Therapies (Done)       Topic: Occupational Therapy (Done)       Point: ADL training (Done)       Description:   Instruct learner(s) on proper safety adaptation and remediation techniques during self care or transfers.   Instruct in proper use of assistive devices.                  Learning Progress Summary             Patient Acceptance, E,D, DU by PG at 11/7/2023 0932                         Point: Home exercise program (Done)       Description:   Instruct learner(s) on appropriate technique for monitoring, assisting and/or progressing therapeutic exercises/activities.                  Learning Progress Summary             Patient Acceptance, E,D, DU by PG at 11/7/2023 0932                         Point: Precautions (Done)       Description:   Instruct learner(s) on prescribed precautions during self-care and functional transfers.                  Learning Progress Summary              Patient Acceptance, E,TRACEE, DU by PG at 11/7/2023 0932                         Point: Body mechanics (Done)       Description:   Instruct learner(s) on proper positioning and spine alignment during self-care, functional mobility activities and/or exercises.                  Learning Progress Summary             Patient Acceptance, E,D, DU by PG at 11/7/2023 0932                                         User Key       Initials Effective Dates Name Provider Type Discipline    PG 06/16/21 -  Kodak Matos OT Occupational Therapist OT                  OT Recommendation and Plan  Planned Therapy Interventions (OT): activity tolerance training, BADL retraining, strengthening exercise, transfer/mobility retraining, patient/caregiver education/training, occupation/activity based interventions  Therapy Frequency (OT): 5 times/wk  Plan of Care Review  Plan of Care Reviewed With: patient  Progress: no change  Outcome Evaluation: Patient presents with limitations affecting strength, activity tolerance, and balance impacting patient's ability to return home safely and independently.  The skills of a therapist will be required to safely and effectively implement the following treatment plan to restore maximal level of function     Time Calculation:   Evaluation Complexity (OT)  Review Occupational Profile/Medical/Therapy History Complexity: brief/low complexity  Assessment, Occupational Performance/Identification of Deficit Complexity: 3-5 performance deficits  Overall Complexity of Evaluation (OT): low complexity     Time Calculation- OT       Row Name 11/15/23 1106             Time Calculation- OT    OT Received On 11/15/23  -PG      OT Goal Re-Cert Due Date 11/24/23  -PG         Timed Charges    68561 - OT Therapeutic Exercise Minutes 25  -PG      26794 - OT Therapeutic Activity Minutes 30  -PG         SNF Occupational Therapy Minutes    Skilled Minutes- OT 55 min  -PG         Total Minutes    Timed Charges Total  Minutes 55  -PG       Total Minutes 55  -PG                User Key  (r) = Recorded By, (t) = Taken By, (c) = Cosigned By      Initials Name Provider Type    PG Kodak Matos OT Occupational Therapist                  Therapy Charges for Today       Code Description Service Date Service Provider Modifiers Qty    20650229812 HC OT THER PROC EA 15 MIN 11/14/2023 Kodak Matos OT GO 3    93082595616 HC OT THERAPEUTIC ACT EA 15 MIN 11/14/2023 Kodak Matos OT GO 1    74395809155 HC OT THER PROC EA 15 MIN 11/15/2023 Kodak Matos OT GO 2    85783088909 HC OT THERAPEUTIC ACT EA 15 MIN 11/15/2023 Kodak Matos OT GO 2                 Kodak Matos OT  11/15/2023

## 2023-11-15 NOTE — CONSULTS
Nutrition Services    Patient Name: Jose Shaikh  YOB: 1960  MRN: [id number]  Admission date: 11/6/2023    PROGRESS NOTE      Encounter Information: Follow up ONS note       PO Diet: Diet: Diabetic Diets, High Protein Diet; Consistent Carbohydrate; Texture: Regular Texture (IDDSI 7); Fluid Consistency: Thin (IDDSI 0)   PO Supplements: Anatoly twice daily with Breakfast and Dinner   PO Intake:  100%       Current nutrition support: NA   Nutrition support review: NA       Labs (reviewed below): Low H/H         GI Function:  Large BM noted 11/15/2023       Nutrition Intervention Updates: Pt upset that Physician (PA) has changed back to a therapeutic diet.  Attempted to elicit understanding that the care prescribed is intended to help not harm.  RD recommends continue the Consistent Carbohydrate Diet and provide additional entree to increase protein needed to aide in wound healing.  Pt was prescribed Anatoly and this order inadvertently was discontinued.  Pt has significant skin break down and would benefit from additional protein. RD left diet as ordered and provided instructions on meal ticket to provide a double entree.  Continue to monitor wound healing and diet acceptance.        Results from last 7 days   Lab Units 11/10/23  0620   SODIUM mmol/L 136   POTASSIUM mmol/L 4.0   CHLORIDE mmol/L 105   CO2 mmol/L 24.8   BUN mg/dL 15   CREATININE mg/dL 0.48*   CALCIUM mg/dL 8.3*   GLUCOSE mg/dL 123*     Results from last 7 days   Lab Units 11/10/23  0620   MAGNESIUM mg/dL 2.0   PHOSPHORUS mg/dL 3.4   HEMOGLOBIN g/dL 8.8*   HEMATOCRIT % 29.5*     COVID19   Date Value Ref Range Status   05/12/2023 Not Detected Not Detected - Ref. Range Final     Lab Results   Component Value Date    HGBA1C 6.60 (H) 04/19/2023       RD to follow up per protocol.    Electronically signed by:  Carmel Patino RD  11/15/23 13:38 EST

## 2023-11-15 NOTE — THERAPY TREATMENT NOTE
SNF - Physical Therapy Treatment Note  KE[facility] Dominguez     Patient Name: Jose Shaikh  : 1960  MRN: [id number]  Today's Date: 11/15/2023      Visit Dx:    ICD-10-CM ICD-9-CM   1. Difficulty in walking  R26.2 719.7     Patient Active Problem List   Diagnosis    Diabetic ulcer of left heel associated with type 2 DM    Acute osteomyelitis of left calcaneus     Diabetic ulcer of left heel associated with type 2 DM    Diabetic ulcer of right midfoot associated with type 2 DM    Paroxysmal atrial fibrillation    Essential hypertension    Hyperlipidemia LDL goal <100    Cellulitis and abscess of foot    High alkaline phosphatase level    Osteomyelitis    Onychomycosis    Onychocryptosis    Foot pain, bilateral    Osteomyelitis of foot, right, acute    Cellulitis of right foot    Type 2 diabetes mellitus, with long-term current use of insulin    Class 3 severe obesity due to excess calories with serious comorbidity and body mass index (BMI) of 45.0 to 49.9 in adult    Anxiety disorder, unspecified    Claustrophobia    Dependence on wheelchair    Depression, unspecified    Long term (current) use of anticoagulants    Long term (current) use of oral hypoglycemic drugs    Wound of foot    Non-prs chronic ulcer oth prt r foot limited to brkdwn skin    Orthostatic hypotension    Other chronic osteomyelitis, right ankle and foot    Personal history of nicotine dependence    Thrombocytopenia, unspecified    Unspecified open wound, right foot, initial encounter    Diabetic foot infection    Subacute osteomyelitis of right foot    Right foot pain    Sepsis    Onychomycosis    Foot pain, left    Impaired mobility and ADLs    Absence of toe of right foot    Corns and callosity    Disability of walking    Fracture    Limb swelling    Polyneuropathy    Pressure ulcer, stage 1    Shortness of breath    Generalized weakness    Debility     Past Medical History:   Diagnosis Date    Absence of toe of right foot     Acute osteomyelitis  of left calcaneus  08/18/2021    Anxiety and depression     Arthritis     Cancer     Claustrophobia     Corns and callus     Diabetic ulcer of left heel associated with type 2 DM 08/18/2021    Diabetic ulcer of left heel associated with type 2 DM 07/06/2021    Diabetic ulcer of right midfoot associated with type 2 DM 08/18/2021    Difficulty walking     Essential hypertension 08/31/2021    Hammertoe     Hyperlipidemia LDL goal <100 08/31/2021    Ingrown toenail     Obesity     Paroxysmal atrial fibrillation 08/31/2021    Polyneuropathy     Pressure ulcer, stage 1     Tinea unguium     Type 2 diabetes mellitus with polyneuropathy      Past Surgical History:   Procedure Laterality Date    CYST REMOVAL      center of back; benign    EYE SURGERY      INCISION AND DRAINAGE ABSCESS      back    INCISION AND DRAINAGE LEG Right 12/10/2021    Procedure: INCISION AND DRAINAGE LOWER EXTREMITY;  Surgeon: Ash Leyva DPM;  Location: [facility];  Service: Podiatry;  Laterality: Right;    OTHER SURGICAL HISTORY      Surgical clips left foot    TOE SURGERY Right     Removal of 5th toe    TRANS METATARSAL AMPUTATION Right 12/02/2021    Procedure: AMPUTATION TRANS METATARSAL;  Surgeon: Ash Leyva DPM;  Location: [facility];  Service: Podiatry;  Laterality: Right;    VASCULAR SURGERY      WRIST SURGERY Left     repair of injury       PT Assessment (last 12 hours)       PT Evaluation and Treatment       Row Name 11/15/23 1307          Physical Therapy Time and Intention    Document Type therapy note (daily note)  -WM     Mode of Treatment individual therapy;physical therapy  -WM     Patient Effort good  -WM     Symptoms Noted During/After Treatment fatigue  -WM       Row Name 11/15/23 1301          Pain Scale: FACES Pre/Post-Treatment    Pain: FACES Scale, Pretreatment 6-->hurts even more  -WM     Posttreatment Pain Rating 6-->hurts even more  -WM     Pain Location - Side/Orientation Left  -WM     Pain  Location - hip;knee  -       Row Name 11/15/23 1301          Sit-Stand Transfer    Sit-Stand [place] (Transfers) moderate assist (50% patient effort);verbal cues;2 person assist  -     Assistive Device (Sit-Stand Transfers) walker, front-wheeled  -     Comment, (Sit-Stand Transfer) Pt stood  twice for 10 seconds each  -       Row Name 11/15/23 1301          Stand-Sit Transfer    Stand-Sit [place] (Transfers) moderate assist (50% patient effort);verbal cues;2 person assist  -     Assistive Device (Stand-Sit Transfers) walker, front-wheeled  -WM       Row Name 11/15/23 1301          Safety Issues, Functional Mobility    Impairments Affecting Function (Mobility) balance;endurance/activity tolerance;pain;range of motion (ROM);strength  -       Row Name 11/15/23 1301          Hip (Therapeutic Exercise)    Hip AAROM (Therapeutic Exercise) bilateral;aBduction;aDduction;supine;10 repetitions;2 sets  -       Row Name 11/15/23 1301          Knee (Therapeutic Exercise)    Knee AAROM (Therapeutic Exercise) bilateral;supine;10 repetitions;2 sets  SAQ  -     Knee Isometrics (Therapeutic Exercise) bilateral;quad sets;supine;10 repetitions;3 second hold;2 sets  -     Knee Strengthening (Therapeutic Exercise) bilateral;SLR (straight leg raise);supine;20 repititions  Active-assisted  -       Row Name 11/15/23 1301          Ankle (Therapeutic Exercise)    Ankle AROM (Therapeutic Exercise) bilateral;dorsiflexion;plantarflexion;supine;20 repititions  -       Row Name             Wound 11/01/23 1312 Left anterior second toe Blisters    Wound - Properties Group Placement Date: 11/01/23  -RASHARD Placement Time: 1312 -JP Present on Original Admission: N  -RASHARD Side: Left  -RASHARD Orientation: anterior  -RASHARD Location: second toe  -RASHARD Primary Wound Type: Blisters  -RASHARD    Retired Wound - Properties Group Placement Date: 11/01/23  -RASHARD Placement Time: 1312 -JP Present on Original Admission: N  -RASHARD Side: Left  -RASHARD  Orientation: anterior  -RASHARD Location: second toe  -RASHARD Primary Wound Type: Blisters  -RASHARD    Retired Wound - Properties Group Date first assessed: 11/01/23  -RASHARD Time first assessed: 1312  -RASHARD Present on Original Admission: N  -RASHARD Side: Left  -RASHARD Location: second toe  -RASHARD Primary Wound Type: Blisters  -RASHARD      Row Name             Wound 11/07/23 1240 Right anterior foot    Wound - Properties Group Placement Date: 11/07/23  - Placement Time: 1240  -FH Side: Right  -FH Orientation: anterior  -FH Location: foot  -FH    Retired Wound - Properties Group Placement Date: 11/07/23  - Placement Time: 1240  -FH Side: Right  -FH Orientation: anterior  -FH Location: foot  -FH    Retired Wound - Properties Group Date first assessed: 11/07/23  - Time first assessed: 1240  -FH Side: Right  -FH Location: foot  -FH      Row Name             Wound 11/07/23 1238 Left lower arm    Wound - Properties Group Placement Date: 11/07/23  - Placement Time: 1238  -FH Side: Left  -FH Orientation: lower  -FH Location: arm  -FH    Retired Wound - Properties Group Placement Date: 11/07/23  - Placement Time: 1238  -FH Side: Left  -FH Orientation: lower  -FH Location: arm  -FH    Retired Wound - Properties Group Date first assessed: 11/07/23  - Time first assessed: 1238  -FH Side: Left  -FH Location: arm  -FH      Row Name 11/15/23 1301          Positioning and Restraints    Pre-Treatment Position in bed  -WM     Post Treatment Position bed  -WM     In Bed fowlers;call light within reach;encouraged to call for assist  -       Row Name 11/15/23 1301          Progress Summary (PT)    Progress Toward Functional Goals (PT) progress toward functional goals is fair  -WM               User Key  (r) = Recorded By, (t) = Taken By, (c) = Cosigned By      Initials Name Provider Type    Karon Jordan RN Registered Nurse    Carlos Anguiano RN Registered Nurse    Carson Johnson PTA Physical Therapist Assistant                   Section G              Section GG                       Physical Therapy Education       Title: PT OT SLP Therapies (Done)       Topic: Physical Therapy (Done)       Point: Mobility training (Done)       Learning Progress Summary             Patient Acceptance, E,TB, VU by MOE at 11/8/2023 1341                         Point: Precautions (Done)       Learning Progress Summary             Patient Acceptance, E,TB, VU by MOE at 11/8/2023 1341                                         User Key       Initials Effective Dates Name Provider Type Discipline    MOE 06/03/21 -  Merlin Rao, PT Physical Therapist PT                  PT Recommendation and Plan     Progress Summary (PT)  Progress Toward Functional Goals (PT): progress toward functional goals is fair  Daily Progress Summary (PT): Pt attempted to stand x 4, but was not able to fully extend his knees.   Outcome Measures       Row Name 11/15/23 1310 11/14/23 1238 11/13/23 1430       How much help from another person do you currently need...    Turning from your back to your side while in flat bed without using bedrails? 2  -WM 2  -WM 2  -WM    Moving from lying on back to sitting on the side of a flat bed without bedrails? 2  -WM 2  -WM 2  -WM    Moving to and from a bed to a chair (including a wheelchair)? 1  -WM 1  -WM 1  -WM    Standing up from a chair using your arms (e.g., wheelchair, bedside chair)? 1  -WM 1  -WM 1  -WM    Climbing 3-5 steps with a railing? 1  -WM 1  -WM 1  -WM    To walk in hospital room? 1  -WM 1  -WM 1  -WM    AM-PAC 6 Clicks Score (PT) 8  -WM 8  -WM 8  -WM    Highest Level of Mobility Goal 3 --> Sit at edge of bed  -WM 3 --> Sit at edge of bed  -WM 3 --> Sat at edge of bed  -WM              User Key  (r) = Recorded By, (t) = Taken By, (c) = Cosigned By      Initials Name Provider Type    Carson Johnson PTA Physical Therapist Assistant                     Time Calculation:    PT Charges       Row Name 11/15/23 1300             Time  Calculation    PT Received On 11/15/23  -WM         Timed Charges    29034 - PT Therapeutic Exercise Minutes 15  -WM      30101 - PT Therapeutic Activity Minutes 25  -WM         SNF Physical Therapy Minutes    Skilled Minutes- PT 40 min  -WM         Total Minutes    Timed Charges Total Minutes 40  -WM       Total Minutes 40  -WM                User Key  (r) = Recorded By, (t) = Taken By, (c) = Cosigned By      Initials Name Provider Type     Carson Inman PTA Physical Therapist Assistant                  Therapy Charges for Today       Code Description Service Date Service Provider Modifiers Qty    14140666672 HC PT THER PROC EA 15 MIN 11/14/2023 Carson Inman PTA GP 1    16003795529 HC PT THERAPEUTIC ACT EA 15 MIN 11/14/2023 Carson Inman PTA GP 2            PT G-Codes  Outcome Measure Options: AM-PAC 6 Clicks Daily Activity (OT), Optimal Instrument  AM-PAC 6 Clicks Score (PT): 8  AM-PAC 6 Clicks Score (OT): 14    Carson Inman PTA  11/15/2023

## 2023-11-15 NOTE — PLAN OF CARE
Goal Outcome Evaluation:  Plan of Care Reviewed With: patient        Progress: no change  Outcome Evaluation: Alert and oriented; able to make needs known to staff. No significant changes this shift. Continues to be incontinent of stool; loose brown tonight. Skin to inner thighs and buttock reddened from incontinence; barrier ointment applied after cleaning skin. Baclofen and Norco administered for left hip spasms with relief. Call light within reach; care plan ongoing.

## 2023-11-16 LAB
ALBUMIN SERPL-MCNC: 3 G/DL (ref 3.5–5.2)
ALBUMIN/GLOB SERPL: 1.1 G/DL
ALP SERPL-CCNC: 244 U/L (ref 39–117)
ALT SERPL W P-5'-P-CCNC: 32 U/L (ref 1–41)
ANION GAP SERPL CALCULATED.3IONS-SCNC: 6.9 MMOL/L (ref 5–15)
AST SERPL-CCNC: 35 U/L (ref 1–40)
BASOPHILS # BLD AUTO: 0.05 10*3/MM3 (ref 0–0.2)
BASOPHILS NFR BLD AUTO: 1.1 % (ref 0–1.5)
BILIRUB SERPL-MCNC: 0.9 MG/DL (ref 0–1.2)
BUN SERPL-MCNC: 15 MG/DL (ref 8–23)
BUN/CREAT SERPL: 26.8 (ref 7–25)
CALCIUM SPEC-SCNC: 8.4 MG/DL (ref 8.6–10.5)
CHLORIDE SERPL-SCNC: 103 MMOL/L (ref 98–107)
CO2 SERPL-SCNC: 25.1 MMOL/L (ref 22–29)
CREAT SERPL-MCNC: 0.56 MG/DL (ref 0.76–1.27)
DEPRECATED RDW RBC AUTO: 43.8 FL (ref 37–54)
EGFRCR SERPLBLD CKD-EPI 2021: 109.4 ML/MIN/1.73
EOSINOPHIL # BLD AUTO: 0.1 10*3/MM3 (ref 0–0.4)
EOSINOPHIL NFR BLD AUTO: 2.2 % (ref 0.3–6.2)
ERYTHROCYTE [DISTWIDTH] IN BLOOD BY AUTOMATED COUNT: 15.9 % (ref 12.3–15.4)
GLOBULIN UR ELPH-MCNC: 2.8 GM/DL
GLUCOSE BLDC GLUCOMTR-MCNC: 131 MG/DL (ref 70–99)
GLUCOSE BLDC GLUCOMTR-MCNC: 164 MG/DL (ref 70–99)
GLUCOSE BLDC GLUCOMTR-MCNC: 173 MG/DL (ref 70–99)
GLUCOSE BLDC GLUCOMTR-MCNC: 93 MG/DL (ref 70–99)
GLUCOSE SERPL-MCNC: 115 MG/DL (ref 65–99)
HCT VFR BLD AUTO: 29.6 % (ref 37.5–51)
HGB BLD-MCNC: 8.8 G/DL (ref 13–17.7)
IMM GRANULOCYTES # BLD AUTO: 0.01 10*3/MM3 (ref 0–0.05)
IMM GRANULOCYTES NFR BLD AUTO: 0.2 % (ref 0–0.5)
IRON 24H UR-MRATE: 19 MCG/DL (ref 59–158)
IRON SATN MFR SERPL: 4 % (ref 20–50)
LYMPHOCYTES # BLD AUTO: 1.09 10*3/MM3 (ref 0.7–3.1)
LYMPHOCYTES NFR BLD AUTO: 24.1 % (ref 19.6–45.3)
MAGNESIUM SERPL-MCNC: 2.1 MG/DL (ref 1.6–2.4)
MCH RBC QN AUTO: 22.8 PG (ref 26.6–33)
MCHC RBC AUTO-ENTMCNC: 29.7 G/DL (ref 31.5–35.7)
MCV RBC AUTO: 76.7 FL (ref 79–97)
MONOCYTES # BLD AUTO: 0.61 10*3/MM3 (ref 0.1–0.9)
MONOCYTES NFR BLD AUTO: 13.5 % (ref 5–12)
NEUTROPHILS NFR BLD AUTO: 2.67 10*3/MM3 (ref 1.7–7)
NEUTROPHILS NFR BLD AUTO: 58.9 % (ref 42.7–76)
NRBC BLD AUTO-RTO: 0 /100 WBC (ref 0–0.2)
PHOSPHATE SERPL-MCNC: 3.8 MG/DL (ref 2.5–4.5)
PLATELET # BLD AUTO: 132 10*3/MM3 (ref 140–450)
PMV BLD AUTO: 11.1 FL (ref 6–12)
POTASSIUM SERPL-SCNC: 4.1 MMOL/L (ref 3.5–5.2)
PROT SERPL-MCNC: 5.8 G/DL (ref 6–8.5)
RBC # BLD AUTO: 3.86 10*6/MM3 (ref 4.14–5.8)
SODIUM SERPL-SCNC: 135 MMOL/L (ref 136–145)
TIBC SERPL-MCNC: 440 MCG/DL (ref 298–536)
TRANSFERRIN SERPL-MCNC: 295 MG/DL (ref 200–360)
WBC NRBC COR # BLD: 4.53 10*3/MM3 (ref 3.4–10.8)

## 2023-11-16 PROCEDURE — 80053 COMPREHEN METABOLIC PANEL: CPT | Performed by: PHYSICIAN ASSISTANT

## 2023-11-16 PROCEDURE — 97530 THERAPEUTIC ACTIVITIES: CPT

## 2023-11-16 PROCEDURE — 82948 REAGENT STRIP/BLOOD GLUCOSE: CPT

## 2023-11-16 PROCEDURE — 83735 ASSAY OF MAGNESIUM: CPT | Performed by: PHYSICIAN ASSISTANT

## 2023-11-16 PROCEDURE — 97110 THERAPEUTIC EXERCISES: CPT

## 2023-11-16 PROCEDURE — 63710000001 INSULIN DETEMIR PER 5 UNITS: Performed by: PHYSICIAN ASSISTANT

## 2023-11-16 PROCEDURE — 63710000001 INSULIN LISPRO (HUMAN) PER 5 UNITS: Performed by: INTERNAL MEDICINE

## 2023-11-16 PROCEDURE — 84100 ASSAY OF PHOSPHORUS: CPT | Performed by: PHYSICIAN ASSISTANT

## 2023-11-16 PROCEDURE — 84466 ASSAY OF TRANSFERRIN: CPT | Performed by: PHYSICIAN ASSISTANT

## 2023-11-16 PROCEDURE — 85025 COMPLETE CBC W/AUTO DIFF WBC: CPT | Performed by: PHYSICIAN ASSISTANT

## 2023-11-16 PROCEDURE — 83540 ASSAY OF IRON: CPT | Performed by: PHYSICIAN ASSISTANT

## 2023-11-16 RX ADMIN — PREGABALIN 100 MG: 100 CAPSULE ORAL at 15:29

## 2023-11-16 RX ADMIN — LISINOPRIL 2.5 MG: 2.5 TABLET ORAL at 09:02

## 2023-11-16 RX ADMIN — Medication 250 MG: at 09:02

## 2023-11-16 RX ADMIN — PREGABALIN 100 MG: 100 CAPSULE ORAL at 20:15

## 2023-11-16 RX ADMIN — VANCOMYCIN HYDROCHLORIDE 125 MG: 125 CAPSULE ORAL at 17:07

## 2023-11-16 RX ADMIN — BACLOFEN 10 MG: 10 TABLET ORAL at 23:35

## 2023-11-16 RX ADMIN — CYANOCOBALAMIN TAB 500 MCG 1000 MCG: 500 TAB at 09:02

## 2023-11-16 RX ADMIN — INSULIN DETEMIR 70 UNITS: 100 INJECTION, SOLUTION SUBCUTANEOUS at 09:01

## 2023-11-16 RX ADMIN — VANCOMYCIN HYDROCHLORIDE 125 MG: 125 CAPSULE ORAL at 23:35

## 2023-11-16 RX ADMIN — APIXABAN 5 MG: 5 TABLET, FILM COATED ORAL at 09:02

## 2023-11-16 RX ADMIN — BACLOFEN 10 MG: 10 TABLET ORAL at 15:27

## 2023-11-16 RX ADMIN — HYDROCODONE BITARTRATE AND ACETAMINOPHEN 1 TABLET: 7.5; 325 TABLET ORAL at 20:15

## 2023-11-16 RX ADMIN — EMPAGLIFLOZIN 10 MG: 10 TABLET, FILM COATED ORAL at 09:02

## 2023-11-16 RX ADMIN — INSULIN LISPRO 4 UNITS: 100 INJECTION, SOLUTION INTRAVENOUS; SUBCUTANEOUS at 12:07

## 2023-11-16 RX ADMIN — HYDROCODONE BITARTRATE AND ACETAMINOPHEN 1 TABLET: 7.5; 325 TABLET ORAL at 11:23

## 2023-11-16 RX ADMIN — Medication 250 MG: at 20:15

## 2023-11-16 RX ADMIN — Medication: at 09:03

## 2023-11-16 RX ADMIN — LIDOCAINE 1 PATCH: 4 PATCH TOPICAL at 09:01

## 2023-11-16 RX ADMIN — HYDROCODONE BITARTRATE AND ACETAMINOPHEN 1 TABLET: 7.5; 325 TABLET ORAL at 01:18

## 2023-11-16 RX ADMIN — PREGABALIN 100 MG: 100 CAPSULE ORAL at 09:02

## 2023-11-16 RX ADMIN — ATORVASTATIN CALCIUM 10 MG: 10 TABLET, FILM COATED ORAL at 20:15

## 2023-11-16 RX ADMIN — INSULIN LISPRO 4 UNITS: 100 INJECTION, SOLUTION INTRAVENOUS; SUBCUTANEOUS at 20:16

## 2023-11-16 RX ADMIN — VANCOMYCIN HYDROCHLORIDE 125 MG: 125 CAPSULE ORAL at 11:23

## 2023-11-16 RX ADMIN — APIXABAN 5 MG: 5 TABLET, FILM COATED ORAL at 20:15

## 2023-11-16 RX ADMIN — INSULIN DETEMIR 60 UNITS: 100 INJECTION, SOLUTION SUBCUTANEOUS at 20:16

## 2023-11-16 RX ADMIN — HYDROCORTISONE 1 APPLICATION: 1 OINTMENT TOPICAL at 11:23

## 2023-11-16 RX ADMIN — VANCOMYCIN HYDROCHLORIDE 125 MG: 125 CAPSULE ORAL at 05:34

## 2023-11-16 NOTE — PLAN OF CARE
Goal Outcome Evaluation:  Plan of Care Reviewed With: patient        Progress: no change  Outcome Evaluation: Aox4, call light and personal items within reach. Reminder to shift weight, education given. Help with shifting weight, skin barrier applied PRN. Education given about q2 turning. I/C of both badder and BM. Able to turn self. Tolorated wound care. Will con't POC.

## 2023-11-16 NOTE — THERAPY TREATMENT NOTE
Patient Name: Jose Shaikh  : 1960    MRN: [id number]                              Today's Date: 2023       Admit Date: 2023    Visit Dx:     ICD-10-CM ICD-9-CM   1. Difficulty in walking  R26.2 719.7     Patient Active Problem List   Diagnosis    Diabetic ulcer of left heel associated with type 2 DM    Acute osteomyelitis of left calcaneus     Diabetic ulcer of left heel associated with type 2 DM    Diabetic ulcer of right midfoot associated with type 2 DM    Paroxysmal atrial fibrillation    Essential hypertension    Hyperlipidemia LDL goal <100    Cellulitis and abscess of foot    High alkaline phosphatase level    Osteomyelitis    Onychomycosis    Onychocryptosis    Foot pain, bilateral    Osteomyelitis of foot, right, acute    Cellulitis of right foot    Type 2 diabetes mellitus, with long-term current use of insulin    Class 3 severe obesity due to excess calories with serious comorbidity and body mass index (BMI) of 45.0 to 49.9 in adult    Anxiety disorder, unspecified    Claustrophobia    Dependence on wheelchair    Depression, unspecified    Long term (current) use of anticoagulants    Long term (current) use of oral hypoglycemic drugs    Wound of foot    Non-prs chronic ulcer oth prt r foot limited to brkdwn skin    Orthostatic hypotension    Other chronic osteomyelitis, right ankle and foot    Personal history of nicotine dependence    Thrombocytopenia, unspecified    Unspecified open wound, right foot, initial encounter    Diabetic foot infection    Subacute osteomyelitis of right foot    Right foot pain    Sepsis    Onychomycosis    Foot pain, left    Impaired mobility and ADLs    Absence of toe of right foot    Corns and callosity    Disability of walking    Fracture    Limb swelling    Polyneuropathy    Pressure ulcer, stage 1    Shortness of breath    Generalized weakness    Debility     Past Medical History:   Diagnosis Date    Absence of toe of right foot     Acute  osteomyelitis of left calcaneus  08/18/2021    Anxiety and depression     Arthritis     Cancer     Claustrophobia     Corns and callus     Diabetic ulcer of left heel associated with type 2 DM 08/18/2021    Diabetic ulcer of left heel associated with type 2 DM 07/06/2021    Diabetic ulcer of right midfoot associated with type 2 DM 08/18/2021    Difficulty walking     Essential hypertension 08/31/2021    Hammertoe     Hyperlipidemia LDL goal <100 08/31/2021    Ingrown toenail     Obesity     Paroxysmal atrial fibrillation 08/31/2021    Polyneuropathy     Pressure ulcer, stage 1     Tinea unguium     Type 2 diabetes mellitus with polyneuropathy      Past Surgical History:   Procedure Laterality Date    CYST REMOVAL      center of back; benign    EYE SURGERY      INCISION AND DRAINAGE ABSCESS      back    INCISION AND DRAINAGE LEG Right 12/10/2021    Procedure: INCISION AND DRAINAGE LOWER EXTREMITY;  Surgeon: Ash Leyva DPM;  Location: [facility];  Service: Podiatry;  Laterality: Right;    OTHER SURGICAL HISTORY      Surgical clips left foot    TOE SURGERY Right     Removal of 5th toe    TRANS METATARSAL AMPUTATION Right 12/02/2021    Procedure: AMPUTATION TRANS METATARSAL;  Surgeon: Ash Leyva DPM;  [place] [facility] [place]  Service: Podiatry;  Laterality: Right;    VASCULAR SURGERY      WRIST SURGERY Left     repair of injury      General Information       Row Name 11/16/23 1256          OT Time and Intention    Document Type therapy note (daily note)  -PG     Mode of Treatment individual therapy;occupational therapy  -PG               User Key  (r) = Recorded By, (t) = Taken By, (c) = Cosigned By      Initials Name Provider Type    PG Kodak Matos OT Occupational Therapist                     Mobility/ADL's       Row Name 11/16/23 5458          Sit-Stand Transfer    Sit-Stand [place] (Transfers) maximum assist (25% patient effort);2 person assist  -PG     Assistive Device  (Sit-Stand Transfers) walker, front-wheeled  -PG       Row Name 11/16/23 1256          Stand-Sit Transfer    Stand-Sit [place] (Transfers) maximum assist (25% patient effort);2 person assist  -PG     Assistive Device (Stand-Sit Transfers) walker, front-wheeled  -PG               User Key  (r) = Recorded By, (t) = Taken By, (c) = Cosigned By      Initials Name Provider Type    PG Kodak Matos OT Occupational Therapist                   Obj/Interventions       Row Name 11/16/23 1257          Shoulder (Therapeutic Exercise)    Shoulder Strengthening (Therapeutic Exercise) 5 lb free weight;20 repititions;2 sets  -PG       Row Name 11/16/23 1257          Elbow/Forearm (Therapeutic Exercise)    Elbow/Forearm Strengthening (Therapeutic Exercise) 20 repititions;5 lb free weight;2 sets  -PG       Row Name 11/16/23 1257          Motor Skills    Therapeutic Exercise shoulder;elbow/forearm  -PG               User Key  (r) = Recorded By, (t) = Taken By, (c) = Cosigned By      Initials Name Provider Type    PG Kodak Matos, BEKAH Occupational Therapist                   Goals/Plan    No documentation.                  Clinical Impression       Row Name 11/16/23 1257          Plan of Care Review    Progress no change  -PG               User Key  (r) = Recorded By, (t) = Taken By, (c) = Cosigned By      Initials Name Provider Type    PG Kodak Matos OT Occupational Therapist                   Outcome Measures       Row Name 11/16/23 1257          How much help from another is currently needed...    Putting on and taking off regular lower body clothing? 1  -PG     Bathing (including washing, rinsing, and drying) 2  -PG     Toileting (which includes using toilet bed pan or urinal) 1  -PG     Putting on and taking off regular upper body clothing 3  -PG     Taking care of personal grooming (such as brushing teeth) 3  -PG     Eating meals 4  -PG     AM-PAC 6 Clicks Score (OT) 14  -PG       Row Name 11/16/23 1100          How much  help from another person do you currently need...    Turning from your back to your side while in flat bed without using bedrails? 2  -MOE (r) ZT (t) MOE (c)     Moving from lying on back to sitting on the side of a flat bed without bedrails? 2  -MOE (r) ZT (t) MOE (c)     Moving to and from a bed to a chair (including a wheelchair)? 1  -MOE (r) ZT (t) MOE (c)     Standing up from a chair using your arms (e.g., wheelchair, bedside chair)? 1  -MOE (r) ZT (t) MOE (c)     Climbing 3-5 steps with a railing? 1  -MOE (r) ZT (t) MOE (c)     To walk in hospital room? 1  -MOE (r) ZT (t) MOE (c)     AM-PAC 6 Clicks Score (PT) 8  -MOE (r) ZT (t)     Highest Level of Mobility Goal 3 --> Sit at edge of bed  -MOE (r) ZT (t)       Row Name 11/16/23 1257          Functional Assessment    Outcome Measure Options AM-PAC 6 Clicks Daily Activity (OT);Optimal Instrument  -PG       Row Name 11/16/23 1257          Optimal Instrument    Optimal Instrument Optimal - 3  -PG     Bending/Stooping 4  -PG     Standing 4  -PG     Reaching 2  -PG               User Key  (r) = Recorded By, (t) = Taken By, (c) = Cosigned By      Initials Name Provider Type    PG Kodak Matos, OT Occupational Therapist    Merlin De La O, PT Physical Therapist    ZT Oscar Shields PT Student PT Student                    Occupational Therapy Education       Title: PT OT SLP Therapies (Done)       Topic: Occupational Therapy (Done)       Point: ADL training (Done)       Description:   Instruct learner(s) on proper safety adaptation and remediation techniques during self care or transfers.   Instruct in proper use of assistive devices.                  Learning Progress Summary             Patient Acceptance, E,D, DU by PG at 11/7/2023 3500                         Point: Home exercise program (Done)       Description:   Instruct learner(s) on appropriate technique for monitoring, assisting and/or progressing therapeutic exercises/activities.                  Learning Progress  Summary             Patient Acceptance, E,D, DU by PG at 11/7/2023 0932                         Point: Precautions (Done)       Description:   Instruct learner(s) on prescribed precautions during self-care and functional transfers.                  Learning Progress Summary             Patient Acceptance, E,D, DU by PG at 11/7/2023 0932                         Point: Body mechanics (Done)       Description:   Instruct learner(s) on proper positioning and spine alignment during self-care, functional mobility activities and/or exercises.                  Learning Progress Summary             Patient Acceptance, E,D, DU by PG at 11/7/2023 0932                                         User Key       Initials Effective Dates Name Provider Type Discipline    PG 06/16/21 -  Kodak Matos OT Occupational Therapist OT                  OT Recommendation and Plan  Planned Therapy Interventions (OT): activity tolerance training, BADL retraining, strengthening exercise, transfer/mobility retraining, patient/caregiver education/training, occupation/activity based interventions  Therapy Frequency (OT): 5 times/wk  Plan of Care Review  Plan of Care Reviewed With: patient  Progress: no change  Outcome Evaluation: Patient presents with limitations affecting strength, activity tolerance, and balance impacting patient's ability to return home safely and independently.  The skills of a therapist will be required to safely and effectively implement the following treatment plan to restore maximal level of function     Time Calculation:   Evaluation Complexity (OT)  Review Occupational Profile/Medical/Therapy History Complexity: brief/low complexity  Assessment, Occupational Performance/Identification of Deficit Complexity: 3-5 performance deficits  Overall Complexity of Evaluation (OT): low complexity     Time Calculation- OT       Row Name 11/16/23 1258 11/16/23 1120          Time Calculation- OT    OT Received On 11/16/23  -PG --     OT  Goal Re-Cert Due Date 11/24/23  -PG --        Timed Charges    43136 - OT Therapeutic Exercise Minutes 25  -PG --     44981 - Gait Training Minutes  -- --  -MOE     70835 - OT Therapeutic Activity Minutes 30  -PG --        SNF Occupational Therapy Minutes    Skilled Minutes- OT 55 min  -PG --        Total Minutes    Timed Charges Total Minutes 55  -PG --  -MOE      Total Minutes 55  -PG --  -MOE               User Key  (r) = Recorded By, (t) = Taken By, (c) = Cosigned By      Initials Name Provider Type    PG Kodak Matos, OT Occupational Therapist    MOEMerlin Muñiz, PT Physical Therapist                  Therapy Charges for Today       Code Description Service Date Service Provider Modifiers Qty    09369263599 HC OT THER PROC EA 15 MIN 11/15/2023 Kodak Matos OT GO 2    83038037499 HC OT THERAPEUTIC ACT EA 15 MIN 11/15/2023 Kodak Matos OT GO 2    14995534756 HC OT THER PROC EA 15 MIN 11/16/2023 Kodak Matos, OT GO 2    36498480861 HC OT THERAPEUTIC ACT EA 15 MIN 11/16/2023 Kodak Matos, OT GO 2                 Kodak Matos OT  11/16/2023

## 2023-11-16 NOTE — THERAPY TREATMENT NOTE
Acute Care - Physical Therapy Treatment Note  KEO Dominguez     Patient Name: Jose Shaikh  : 1960  MRN: [id number]  Today's Date: 2023      Visit Dx:     ICD-10-CM ICD-9-CM   1. Difficulty in walking  R26.2 719.7     Patient Active Problem List   Diagnosis    Diabetic ulcer of left heel associated with type 2 DM    Acute osteomyelitis of left calcaneus     Diabetic ulcer of left heel associated with type 2 DM    Diabetic ulcer of right midfoot associated with type 2 DM    Paroxysmal atrial fibrillation    Essential hypertension    Hyperlipidemia LDL goal <100    Cellulitis and abscess of foot    High alkaline phosphatase level    Osteomyelitis    Onychomycosis    Onychocryptosis    Foot pain, bilateral    Osteomyelitis of foot, right, acute    Cellulitis of right foot    Type 2 diabetes mellitus, with long-term current use of insulin    Class 3 severe obesity due to excess calories with serious comorbidity and body mass index (BMI) of 45.0 to 49.9 in adult    Anxiety disorder, unspecified    Claustrophobia    Dependence on wheelchair    Depression, unspecified    Long term (current) use of anticoagulants    Long term (current) use of oral hypoglycemic drugs    Wound of foot    Non-prs chronic ulcer oth prt r foot limited to brkdwn skin    Orthostatic hypotension    Other chronic osteomyelitis, right ankle and foot    Personal history of nicotine dependence    Thrombocytopenia, unspecified    Unspecified open wound, right foot, initial encounter    Diabetic foot infection    Subacute osteomyelitis of right foot    Right foot pain    Sepsis    Onychomycosis    Foot pain, left    Impaired mobility and ADLs    Absence of toe of right foot    Corns and callosity    Disability of walking    Fracture    Limb swelling    Polyneuropathy    Pressure ulcer, stage 1    Shortness of breath    Generalized weakness    Debility     Past Medical History:   Diagnosis Date    Absence of toe of right foot     Acute  osteomyelitis of left calcaneus  08/18/2021    Anxiety and depression     Arthritis     Cancer     Claustrophobia     Corns and callus     Diabetic ulcer of left heel associated with type 2 DM 08/18/2021    Diabetic ulcer of left heel associated with type 2 DM 07/06/2021    Diabetic ulcer of right midfoot associated with type 2 DM 08/18/2021    Difficulty walking     Essential hypertension 08/31/2021    Hammertoe     Hyperlipidemia LDL goal <100 08/31/2021    Ingrown toenail     Obesity     Paroxysmal atrial fibrillation 08/31/2021    Polyneuropathy     Pressure ulcer, stage 1     Tinea unguium     Type 2 diabetes mellitus with polyneuropathy      Past Surgical History:   Procedure Laterality Date    CYST REMOVAL      center of back; benign    EYE SURGERY      INCISION AND DRAINAGE ABSCESS      back    INCISION AND DRAINAGE LEG Right 12/10/2021    Procedure: INCISION AND DRAINAGE LOWER EXTREMITY;  Surgeon: Ash Leyva DPM;  Location: [facility];  Service: Podiatry;  Laterality: Right;    OTHER SURGICAL HISTORY      Surgical clips left foot    TOE SURGERY Right     Removal of 5th toe    TRANS METATARSAL AMPUTATION Right 12/02/2021    Procedure: AMPUTATION TRANS METATARSAL;  Surgeon: Ash Leyva DPM;  Location: [facility] OR;  Service: Podiatry;  Laterality: Right;    VASCULAR SURGERY      WRIST SURGERY Left     repair of injury     PT Assessment (last 12 hours)       PT Evaluation and Treatment       Row Name 11/16/23 1100          Physical Therapy Time and Intention    Subjective Information complains of;weakness;fatigue;pain  -MOE (r) ZT (t) MOE (c)     Document Type therapy note (daily note)  -MOE (r) ZT (t) MOE (c)     Mode of Treatment individual therapy;physical therapy  -MOE (r) ZT (t) MOE (c)     Patient Effort good  -MOE (r) ZT (t) MOE (c)       Row Name 11/16/23 1100          General Information    Patient Profile Reviewed yes  -MOE (r) ZT (t) MOE (c)     Patient Observations  alert;cooperative;agree to therapy  -MOE (r) ZT (t) MOE (c)     Existing Precautions/Restrictions fall  -MOE (r) ZT (t) MOE (c)       Row Name 11/16/23 1100          Bed Mobility    Bed Mobility bed mobility (all) activities  -MOE (r) ZT (t) MOE (c)     All Activities, [place] (Bed Mobility) standby assist  -MOE (r) ZT (t) MOE (c)     Assistive Device (Bed Mobility) bed rails;draw sheet;head of bed elevated;leg ;overhead trapeze  -MOE (r) ZT (t) MOE (c)       Row Name 11/16/23 1100          Transfers    Transfers sit-stand transfer;stand-sit transfer  -MOE (r) ZT (t) MOE (c)       Row Name 11/16/23 1100          Sit-Stand Transfer    Sit-Stand [place] (Transfers) maximum assist (25% patient effort);2 person assist  4x for up to 1 min at at time.  Fatigues quickly  -MOE     Assistive Device (Sit-Stand Transfers) walker, front-wheeled  -MOE (r) ZT (t) MOE (c)       Row Name 11/16/23 1100          Stand-Sit Transfer    Stand-Sit [place] (Transfers) maximum assist (25% patient effort);2 person assist  -MOE (r) ZT (t) MOE (c)     Assistive Device (Stand-Sit Transfers) walker, front-wheeled  -MOE (r) ZT (t) MOE (c)       Row Name 11/16/23 1100          Gait/Stairs (Locomotion)    Gait/Stairs Locomotion other (see comments)  Pt unable to AMB at this time  -MOE (r) ZT (t) MOE (c)       Row Name 11/16/23 1100          Safety Issues, Functional Mobility    Impairments Affecting Function (Mobility) balance;endurance/activity tolerance;pain;range of motion (ROM);strength  -MOE (r) ZT (t) MOE (c)       Row Name 11/16/23 1100          Balance    Balance Assessment standing static balance  -MOE (r) ZT (t) MEO (c)     Static Standing Balance maximum assist;2-person assist  -MOE (r) ZT (t) MOE (c)     Position/Device Used, Standing Balance walker, front-wheeled  -MOE (r) ZT (t) MOE (c)       Row Name             Wound 11/01/23 1312 Left anterior second toe Blisters    Wound - Properties Group Placement Date: 11/01/23  -RASHARD Placement Time:  1312  -RASHARD Present on Original Admission: N  -RASHARD Side: Left  -RASHARD Orientation: anterior  -RASHARD Location: second toe  -RASHARD Primary Wound Type: Blisters  -RASHARD    Retired Wound - Properties Group Placement Date: 11/01/23  -RASHARD Placement Time: 1312  -RASHARD Present on Original Admission: N  -RASHARD Side: Left  -RASHARD Orientation: anterior  -RASHARD Location: second toe  -RASHARD Primary Wound Type: Blisters  -RASHARD    Retired Wound - Properties Group Date first assessed: 11/01/23  -RASHARD Time first assessed: 1312  -RASHARD Present on Original Admission: N  -RASHARD Side: Left  -RASHARD Location: second toe  -RASHARD Primary Wound Type: Blisters  -RASHARD      Row Name             Wound 11/07/23 1240 Right anterior foot    Wound - Properties Group Placement Date: 11/07/23  - Placement Time: 1240  -FH Side: Right  -FH Orientation: anterior  -FH Location: foot  -FH    Retired Wound - Properties Group Placement Date: 11/07/23  - Placement Time: 1240  -FH Side: Right  -FH Orientation: anterior  -FH Location: foot  -FH    Retired Wound - Properties Group Date first assessed: 11/07/23  - Time first assessed: 1240  -FH Side: Right  -FH Location: foot  -FH      Row Name             Wound 11/07/23 1238 Left lower arm    Wound - Properties Group Placement Date: 11/07/23  - Placement Time: 1238  -FH Side: Left  -FH Orientation: lower  -FH Location: arm  -FH    Retired Wound - Properties Group Placement Date: 11/07/23  - Placement Time: 1238  -FH Side: Left  -FH Orientation: lower  -FH Location: arm  -FH    Retired Wound - Properties Group Date first assessed: 11/07/23  - Time first assessed: 1238  -FH Side: Left  -FH Location: arm  -FH      Row Name 11/16/23 1100          Plan of Care Review    Plan of Care Reviewed With patient  -MOE (r) ZT (t) MOE (c)       Row Name 11/16/23 1100          Positioning and Restraints    Pre-Treatment Position in bed  -MOE (r) ZT (t) MOE (c)     Post Treatment Position bed  -MOE (r) ZT (t) MOE (c)     In Bed call light within reach;encouraged to call  for assist;exit alarm on  -MOE (r) ZT (t) MOE (c)       Row Name 11/16/23 1100          Therapy Assessment/Plan (PT)    Rehab Potential (PT) fair, will monitor progress closely  -MOE (r) ZT (t) MOE (c)     Criteria for Skilled Interventions Met (PT) yes;skilled treatment is necessary  -MOE (r) ZT (t) MOE (c)     Therapy Frequency (PT) 6 times/wk  -MOE (r) ZT (t) MOE (c)     Problem List (PT) problems related to;balance;mobility;strength;pain  -MOE (r) ZT (t) MOE (c)     Activity Limitations Related to Problem List (PT) unable to ambulate safely;unable to transfer safely  -MOE (r) ZT (t) MOE (c)       Row Name 11/16/23 1100          Progress Summary (PT)    Progress Toward Functional Goals (PT) progress toward functional goals is good  -MOE (r) ZT (t) MOE (c)     Daily Progress Summary (PT) Pt presents in a deconditioned state secondary to recent hospital stay. He has LLE pain with bed mobility and fatigues very quickly when standing. He continues to require skilled PT services to address these deficit so that he can safely return to his PLOF.  -MOE (r) ZT (t) MOE (c)       Row Name 11/16/23 1100          Therapy Plan Review/Discharge Plan (PT)    Therapy Plan Review (PT) evaluation/treatment results reviewed;care plan/treatment goals reviewed;participants included;patient  -MOE (r) ZT (t) MOE (c)               User Key  (r) = Recorded By, (t) = Taken By, (c) = Cosigned By      Initials Name Provider Type    Karon Jordan, RN Registered Nurse    Carlos Anguiano RN Registered Nurse    Merlin De La O, PT Physical Therapist    ZT Oscar Shields, PT Student PT Student                    Physical Therapy Education       Title: PT OT SLP Therapies (Done)       Topic: Physical Therapy (Done)       Point: Mobility training (Done)       Learning Progress Summary             Patient Acceptance, E,TB, VU by MOE at 11/8/2023 1341                         Point: Precautions (Done)       Learning Progress Summary              Patient Acceptance, E,TB, VU by MOE at 11/8/2023 1341                                         User Key       Initials Effective Dates Name Provider Type Discipline    MOE 06/03/21 -  Merlin Rao, PT Physical Therapist PT                  PT Recommendation and Plan  Anticipated Discharge Disposition (PT): home with home health  Planned Therapy Interventions (PT): balance training, gait training, bed mobility training, home exercise program, stair training, strengthening, transfer training  Therapy Frequency (PT): 6 times/wk  Plan of Care Reviewed With: patient  Outcome Evaluation: Patient presents with decreased strength, transfers and ambulation.  Skilled physical therapy services will be required to address these mobility deficits.   Outcome Measures       Row Name 11/16/23 1100 11/15/23 1310 11/14/23 1238       How much help from another person do you currently need...    Turning from your back to your side while in flat bed without using bedrails? 2  -MOE (r) ZT (t) MOE (c) 2  -WM 2  -WM    Moving from lying on back to sitting on the side of a flat bed without bedrails? 2  -MOE (r) ZT (t) MOE (c) 2  -WM 2  -WM    Moving to and from a bed to a chair (including a wheelchair)? 1  -MOE (r) ZT (t) MOE (c) 1  -WM 1  -WM    Standing up from a chair using your arms (e.g., wheelchair, bedside chair)? 1  -MOE (r) ZT (t) MOE (c) 1  -WM 1  -WM    Climbing 3-5 steps with a railing? 1  -MOE (r) ZT (t) MOE (c) 1  -WM 1  -WM    To walk in hospital room? 1  -MOE (r) ZT (t) MOE (c) 1  -WM 1  -WM    AM-PAC 6 Clicks Score (PT) 8  -MOE (r) ZT (t) 8  -WM 8  -WM    Highest Level of Mobility Goal 3 --> Sit at edge of bed  -MOE (r) ZT (t) 3 --> Sit at edge of bed  -WM 3 --> Sit at edge of bed  -WM      Row Name 11/13/23 1430             How much help from another person do you currently need...    Turning from your back to your side while in flat bed without using bedrails? 2  -WM      Moving from lying on back to sitting on the side of a flat  bed without bedrails? 2  -WM      Moving to and from a bed to a chair (including a wheelchair)? 1  -WM      Standing up from a chair using your arms (e.g., wheelchair, bedside chair)? 1  -WM      Climbing 3-5 steps with a railing? 1  -WM      To walk in hospital room? 1  -WM      AM-PAC 6 Clicks Score (PT) 8  -WM      Highest Level of Mobility Goal 3 --> Sat at edge of bed  -WM                User Key  (r) = Recorded By, (t) = Taken By, (c) = Cosigned By      Initials Name Provider Type    WM Carson Inman, PTA Physical Therapist Assistant    Merlin De La O, PT Physical Therapist    ZT Oscar Shields, PT Student PT Student                     Time Calculation:    PT Charges       Row Name 11/16/23 1120             Time Calculation    PT Received On 11/16/23  -MOE (r) ZT (t) MOE (c)         Timed Charges    98740 - Gait Training Minutes  --  -MOE      80640 - PT Therapeutic Activity Minutes 25  -MOE         Total Minutes    Timed Charges Total Minutes 25  -MOE       Total Minutes 25  -MOE                User Key  (r) = Recorded By, (t) = Taken By, (c) = Cosigned By      Initials Name Provider Type    Merlin De La O, PT Physical Therapist    ZT Oscar Shields, PT Student PT Student                      PT G-Codes  Outcome Measure Options: AM-PAC 6 Clicks Daily Activity (OT), Optimal Instrument  AM-PAC 6 Clicks Score (PT): 8  AM-PAC 6 Clicks Score (OT): 14    Merlin Rao, PT  11/16/2023

## 2023-11-16 NOTE — PLAN OF CARE
Goal Outcome Evaluation:  Plan of Care Reviewed With: patient           Outcome Evaluation: Pt is alert and oriented, x 4, cooperative and vitals are stable. He does not get out of bed but helps staff by turning himself. Still upset over his food being more limited. He was medicated x 1 and had relief. Pt still incontinent of bowels and is having loose stools. Will continue with his plan of care. Call light and personal items within reach.

## 2023-11-17 LAB
GLUCOSE BLDC GLUCOMTR-MCNC: 129 MG/DL (ref 70–99)
GLUCOSE BLDC GLUCOMTR-MCNC: 134 MG/DL (ref 70–99)
GLUCOSE BLDC GLUCOMTR-MCNC: 150 MG/DL (ref 70–99)
GLUCOSE BLDC GLUCOMTR-MCNC: 195 MG/DL (ref 70–99)

## 2023-11-17 PROCEDURE — 63710000001 INSULIN LISPRO (HUMAN) PER 5 UNITS: Performed by: INTERNAL MEDICINE

## 2023-11-17 PROCEDURE — 97535 SELF CARE MNGMENT TRAINING: CPT

## 2023-11-17 PROCEDURE — 63710000001 INSULIN DETEMIR PER 5 UNITS: Performed by: PHYSICIAN ASSISTANT

## 2023-11-17 PROCEDURE — 97110 THERAPEUTIC EXERCISES: CPT

## 2023-11-17 PROCEDURE — 82948 REAGENT STRIP/BLOOD GLUCOSE: CPT

## 2023-11-17 PROCEDURE — 97530 THERAPEUTIC ACTIVITIES: CPT

## 2023-11-17 RX ADMIN — Medication: at 09:43

## 2023-11-17 RX ADMIN — LISINOPRIL 2.5 MG: 2.5 TABLET ORAL at 09:42

## 2023-11-17 RX ADMIN — VANCOMYCIN HYDROCHLORIDE 125 MG: 125 CAPSULE ORAL at 12:10

## 2023-11-17 RX ADMIN — APIXABAN 5 MG: 5 TABLET, FILM COATED ORAL at 09:42

## 2023-11-17 RX ADMIN — HYDROCODONE BITARTRATE AND ACETAMINOPHEN 1 TABLET: 7.5; 325 TABLET ORAL at 19:31

## 2023-11-17 RX ADMIN — VANCOMYCIN HYDROCHLORIDE 125 MG: 125 CAPSULE ORAL at 05:35

## 2023-11-17 RX ADMIN — PREGABALIN 100 MG: 100 CAPSULE ORAL at 20:12

## 2023-11-17 RX ADMIN — INSULIN DETEMIR 70 UNITS: 100 INJECTION, SOLUTION SUBCUTANEOUS at 09:42

## 2023-11-17 RX ADMIN — Medication 250 MG: at 09:42

## 2023-11-17 RX ADMIN — HYDROCORTISONE 1 APPLICATION: 1 OINTMENT TOPICAL at 17:22

## 2023-11-17 RX ADMIN — INSULIN DETEMIR 60 UNITS: 100 INJECTION, SOLUTION SUBCUTANEOUS at 20:13

## 2023-11-17 RX ADMIN — APIXABAN 5 MG: 5 TABLET, FILM COATED ORAL at 20:12

## 2023-11-17 RX ADMIN — EMPAGLIFLOZIN 10 MG: 10 TABLET, FILM COATED ORAL at 09:42

## 2023-11-17 RX ADMIN — HYDROCORTISONE 1 APPLICATION: 1 OINTMENT TOPICAL at 20:14

## 2023-11-17 RX ADMIN — ATORVASTATIN CALCIUM 10 MG: 10 TABLET, FILM COATED ORAL at 20:12

## 2023-11-17 RX ADMIN — BACLOFEN 10 MG: 10 TABLET ORAL at 09:42

## 2023-11-17 RX ADMIN — HYDROCORTISONE 1 APPLICATION: 1 OINTMENT TOPICAL at 05:37

## 2023-11-17 RX ADMIN — Medication 250 MG: at 20:12

## 2023-11-17 RX ADMIN — CYANOCOBALAMIN TAB 500 MCG 1000 MCG: 500 TAB at 09:42

## 2023-11-17 RX ADMIN — INSULIN LISPRO 4 UNITS: 100 INJECTION, SOLUTION INTRAVENOUS; SUBCUTANEOUS at 12:10

## 2023-11-17 RX ADMIN — INSULIN LISPRO 4 UNITS: 100 INJECTION, SOLUTION INTRAVENOUS; SUBCUTANEOUS at 20:12

## 2023-11-17 RX ADMIN — VANCOMYCIN HYDROCHLORIDE 125 MG: 125 CAPSULE ORAL at 17:22

## 2023-11-17 RX ADMIN — LIDOCAINE 1 PATCH: 4 PATCH TOPICAL at 09:41

## 2023-11-17 RX ADMIN — PREGABALIN 100 MG: 100 CAPSULE ORAL at 09:42

## 2023-11-17 RX ADMIN — HYDROCODONE BITARTRATE AND ACETAMINOPHEN 1 TABLET: 7.5; 325 TABLET ORAL at 05:39

## 2023-11-17 RX ADMIN — HYDROCODONE BITARTRATE AND ACETAMINOPHEN 1 TABLET: 7.5; 325 TABLET ORAL at 12:10

## 2023-11-17 RX ADMIN — PREGABALIN 100 MG: 100 CAPSULE ORAL at 17:22

## 2023-11-17 NOTE — THERAPY EVALUATION
Patient Name: Jose Shaikh  : 1960    MRN: [id number]                              Today's Date: 2023       Admit Date: 2023    Visit Dx:     ICD-10-CM ICD-9-CM   1. Difficulty in walking  R26.2 719.7     Patient Active Problem List   Diagnosis    Diabetic ulcer of left heel associated with type 2 DM    Acute osteomyelitis of left calcaneus     Diabetic ulcer of left heel associated with type 2 DM    Diabetic ulcer of right midfoot associated with type 2 DM    Paroxysmal atrial fibrillation    Essential hypertension    Hyperlipidemia LDL goal <100    Cellulitis and abscess of foot    High alkaline phosphatase level    Osteomyelitis    Onychomycosis    Onychocryptosis    Foot pain, bilateral    Osteomyelitis of foot, right, acute    Cellulitis of right foot    Type 2 diabetes mellitus, with long-term current use of insulin    Class 3 severe obesity due to excess calories with serious comorbidity and body mass index (BMI) of 45.0 to 49.9 in adult    Anxiety disorder, unspecified    Claustrophobia    Dependence on wheelchair    Depression, unspecified    Long term (current) use of anticoagulants    Long term (current) use of oral hypoglycemic drugs    Wound of foot    Non-prs chronic ulcer oth prt r foot limited to brkdwn skin    Orthostatic hypotension    Other chronic osteomyelitis, right ankle and foot    Personal history of nicotine dependence    Thrombocytopenia, unspecified    Unspecified open wound, right foot, initial encounter    Diabetic foot infection    Subacute osteomyelitis of right foot    Right foot pain    Sepsis    Onychomycosis    Foot pain, left    Impaired mobility and ADLs    Absence of toe of right foot    Corns and callosity    Disability of walking    Fracture    Limb swelling    Polyneuropathy    Pressure ulcer, stage 1    Shortness of breath    Generalized weakness    Debility     Past Medical History:   Diagnosis Date    Absence of toe of right foot     Acute  osteomyelitis of left calcaneus  08/18/2021    Anxiety and depression     Arthritis     Cancer     Claustrophobia     Corns and callus     Diabetic ulcer of left heel associated with type 2 DM 08/18/2021    Diabetic ulcer of left heel associated with type 2 DM 07/06/2021    Diabetic ulcer of right midfoot associated with type 2 DM 08/18/2021    Difficulty walking     Essential hypertension 08/31/2021    Hammertoe     Hyperlipidemia LDL goal <100 08/31/2021    Ingrown toenail     Obesity     Paroxysmal atrial fibrillation 08/31/2021    Polyneuropathy     Pressure ulcer, stage 1     Tinea unguium     Type 2 diabetes mellitus with polyneuropathy      Past Surgical History:   Procedure Laterality Date    CYST REMOVAL      center of back; benign    EYE SURGERY      INCISION AND DRAINAGE ABSCESS      back    INCISION AND DRAINAGE LEG Right 12/10/2021    Procedure: INCISION AND DRAINAGE LOWER EXTREMITY;  Surgeon: Ash Leyva DPM;  Location: [facility];  Service: Podiatry;  Laterality: Right;    OTHER SURGICAL HISTORY      Surgical clips left foot    TOE SURGERY Right     Removal of 5th toe    TRANS METATARSAL AMPUTATION Right 12/02/2021    Procedure: AMPUTATION TRANS METATARSAL;  Surgeon: Ash Leyva DPM;  Location: [facility] OR;  Service: Podiatry;  Laterality: Right;    VASCULAR SURGERY      WRIST SURGERY Left     repair of injury      General Information       Row Name 11/17/23 1109          OT Time and Intention    Document Type therapy note (daily note)  -PG     Mode of Treatment individual therapy;occupational therapy  -PG               User Key  (r) = Recorded By, (t) = Taken By, (c) = Cosigned By      Initials Name Provider Type    PG Kodak Matos OT Occupational Therapist                     Mobility/ADL's       Row Name 11/17/23 1109          Bed Mobility    Bed Mobility supine-sit  -PG     All Activities, [place] (Bed Mobility) standby assist  -PG       Row Name 11/17/23 1100           Transfers    Transfers sit-stand transfer  -PG       Row Name 11/17/23 1109          Sit-Stand Transfer    Sit-Stand [place] (Transfers) moderate assist (50% patient effort);2 person assist;verbal cues  -PG     Assistive Device (Sit-Stand Transfers) walker, front-wheeled  -PG               User Key  (r) = Recorded By, (t) = Taken By, (c) = Cosigned By      Initials Name Provider Type    Kodak Velazco OT Occupational Therapist                   Obj/Interventions       Row Name 11/17/23 1110          Shoulder (Therapeutic Exercise)    Shoulder (Therapeutic Exercise) strengthening exercise  -PG     Shoulder Strengthening (Therapeutic Exercise) 5 lb free weight;20 repititions;green;resistance band  -PG       Row Name 11/17/23 1110          Elbow/Forearm (Therapeutic Exercise)    Elbow/Forearm (Therapeutic Exercise) strengthening exercise  -PG     Elbow/Forearm Strengthening (Therapeutic Exercise) green;5 lb free weight;20 repititions;resistance band  -PG       Row Name 11/17/23 1110          Motor Skills    Therapeutic Exercise shoulder;elbow/forearm  -PG               User Key  (r) = Recorded By, (t) = Taken By, (c) = Cosigned By      Initials Name Provider Type    Kodak Velazco OT Occupational Therapist                   Goals/Plan    No documentation.                  Clinical Impression       Row Name 11/17/23 1110          Plan of Care Review    Progress no change  -PG               User Key  (r) = Recorded By, (t) = Taken By, (c) = Cosigned By      Initials Name Provider Type    Kodak Velazco OT Occupational Therapist                   Outcome Measures       Row Name 11/17/23 1110          How much help from another is currently needed...    Putting on and taking off regular lower body clothing? 1  -PG     Bathing (including washing, rinsing, and drying) 2  -PG     Toileting (which includes using toilet bed pan or urinal) 1  -PG     Putting on and taking off regular upper body clothing 3   -PG     Taking care of personal grooming (such as brushing teeth) 3  -PG     Eating meals 4  -PG     AM-PAC 6 Clicks Score (OT) 14  -PG       Row Name 11/17/23 1110          Functional Assessment    Outcome Measure Options AM-PAC 6 Clicks Daily Activity (OT);Optimal Instrument  -PG       Row Name 11/17/23 1110          Optimal Instrument    Optimal Instrument Optimal - 3  -PG     Bending/Stooping 4  -PG     Standing 4  -PG     Reaching 2  -PG               User Key  (r) = Recorded By, (t) = Taken By, (c) = Cosigned By      Initials Name Provider Type    PG Kodak Matos OT Occupational Therapist                    Occupational Therapy Education       Title: PT OT SLP Therapies (Done)       Topic: Occupational Therapy (Done)       Point: ADL training (Done)       Description:   Instruct learner(s) on proper safety adaptation and remediation techniques during self care or transfers.   Instruct in proper use of assistive devices.                  Learning Progress Summary             Patient Acceptance, E,D, DU by PG at 11/7/2023 0932                         Point: Home exercise program (Done)       Description:   Instruct learner(s) on appropriate technique for monitoring, assisting and/or progressing therapeutic exercises/activities.                  Learning Progress Summary             Patient Acceptance, E,D, DU by PG at 11/7/2023 0932                         Point: Precautions (Done)       Description:   Instruct learner(s) on prescribed precautions during self-care and functional transfers.                  Learning Progress Summary             Patient Acceptance, E,D, DU by PG at 11/7/2023 0932                         Point: Body mechanics (Done)       Description:   Instruct learner(s) on proper positioning and spine alignment during self-care, functional mobility activities and/or exercises.                  Learning Progress Summary             Patient Acceptance, E,D, DU by PG at 11/7/2023 0932                                          User Key       Initials Effective Dates Name Provider Type Discipline    PG 06/16/21 -  Kodak Matos OT Occupational Therapist OT                  OT Recommendation and Plan  Planned Therapy Interventions (OT): activity tolerance training, BADL retraining, strengthening exercise, transfer/mobility retraining, patient/caregiver education/training, occupation/activity based interventions  Therapy Frequency (OT): 5 times/wk  Plan of Care Review  Plan of Care Reviewed With: patient  Progress: no change  Outcome Evaluation: Patient presents with limitations affecting strength, activity tolerance, and balance impacting patient's ability to return home safely and independently.  The skills of a therapist will be required to safely and effectively implement the following treatment plan to restore maximal level of function     Time Calculation:   Evaluation Complexity (OT)  Review Occupational Profile/Medical/Therapy History Complexity: brief/low complexity  Assessment, Occupational Performance/Identification of Deficit Complexity: 3-5 performance deficits  Overall Complexity of Evaluation (OT): low complexity     Time Calculation- OT       Row Name 11/17/23 1111             Time Calculation- OT    OT Received On 11/17/23  -PG      OT Goal Re-Cert Due Date 11/24/23  -PG         Timed Charges    03022 - OT Therapeutic Exercise Minutes 25  -PG      44867 - OT Therapeutic Activity Minutes 15  -PG      76196 - OT Self Care/Mgmt Minutes 15  -PG         Total Minutes    Timed Charges Total Minutes 55  -PG       Total Minutes 55  -PG                User Key  (r) = Recorded By, (t) = Taken By, (c) = Cosigned By      Initials Name Provider Type    PG Kodak Matos OT Occupational Therapist                  Therapy Charges for Today       Code Description Service Date Service Provider Modifiers Qty    21331639202  OT THER PROC EA 15 MIN 11/16/2023 Kodak Matos OT GO 2    [id number]  OT  THERAPEUTIC ACT EA 15 MIN 11/16/2023 Kodak Matos, OT GO 2    29629874445 HC OT THER PROC EA 15 MIN 11/17/2023 Kodak Matos OT GO 2    33368767543 HC OT THERAPEUTIC ACT EA 15 MIN 11/17/2023 Kodak Matos, OT GO 1    24470295347 HC OT SELF CARE/MGMT/TRAIN EA 15 MIN 11/17/2023 Kodak Matos OT GO 1                 Kodak Matos OT  11/17/2023

## 2023-11-17 NOTE — SIGNIFICANT NOTE
Wound Eval / Progress Noted     Angelica     Patient Name: Jose Shaikh  : 1960  MRN: [id number]  Today's Date: 2023                 Admit Date: 2023    Visit Dx:    ICD-10-CM ICD-9-CM   1. Difficulty in walking  R26.2 719.7         Debility        Past Medical History:   Diagnosis Date    Absence of toe of right foot     Acute osteomyelitis of left calcaneus  2021    Anxiety and depression     Arthritis     Cancer     Claustrophobia     Corns and callus     Diabetic ulcer of left heel associated with type 2 DM 2021    Diabetic ulcer of left heel associated with type 2 DM 2021    Diabetic ulcer of right midfoot associated with type 2 DM 2021    Difficulty walking     Essential hypertension 2021    Hammertoe     Hyperlipidemia LDL goal <100 2021    Ingrown toenail     Obesity     Paroxysmal atrial fibrillation 2021    Polyneuropathy     Pressure ulcer, stage 1     Tinea unguium     Type 2 diabetes mellitus with polyneuropathy         Past Surgical History:   Procedure Laterality Date    CYST REMOVAL      center of back; benign    EYE SURGERY      INCISION AND DRAINAGE ABSCESS      back    INCISION AND DRAINAGE LEG Right 12/10/2021    Procedure: INCISION AND DRAINAGE LOWER EXTREMITY;  Surgeon: Ash Leyva DPM;  Location: [facility] OR;  Service: Podiatry;  Laterality: Right;    OTHER SURGICAL HISTORY      Surgical clips left foot    TOE SURGERY Right     Removal of 5th toe    TRANS METATARSAL AMPUTATION Right 2021    Procedure: AMPUTATION TRANS METATARSAL;  Surgeon: Ash Leyva DPM;  Location: [facility] MAIN OR;  Service: Podiatry;  Laterality: Right;    VASCULAR SURGERY      WRIST SURGERY Left     repair of injury         Physical Assessment:  Wound 23 1312 Left anterior second toe Blisters (Active)   Dressing Appearance open to air 23 0950   Closure None 23 0950   Base dry;red 23 0950   Periwound  intact;dry 11/17/23 0950   Periwound Temperature warm 11/17/23 0950   Periwound Skin Turgor soft 11/17/23 0950   Edges rolled/closed 11/17/23 0950   Drainage Amount none 11/17/23 0950   Care, Wound cleansed with;sterile normal saline 11/17/23 0950   Dressing Care open to air;skin barrier agent applied 11/17/23 0950   Periwound Care topical treatment applied 11/17/23 0950       Wound 11/07/23 1240 Right anterior foot (Active)   Wound Image   11/17/23 1200   Dressing Appearance intact;dry 11/17/23 1200   Closure None 11/17/23 1200   Base pink;red;moist 11/17/23 1200   Red (%), Wound Tissue Color 100 11/17/23 1200   Periwound dry;intact;pink 11/17/23 1200   Periwound Temperature warm 11/16/23 1500   Periwound Skin Turgor soft 11/17/23 1200   Edges open 11/17/23 1200   Drainage Characteristics/Odor serosanguineous 11/17/23 1200   Drainage Amount small 11/17/23 1200   Care, Wound cleansed with;sterile normal saline 11/17/23 1200   Dressing Care dressing applied;non-adherent;petroleum-based;gauze;silicone;border dressing 11/17/23 1200   Periwound Care absorptive dressing applied 11/17/23 1200       Wound Check / Follow-up: Patient seen today for a wound check and dressing change. Patient was incontinent of stool prior to arrival; PCA assisting in incontinence care. Perirectal tissue is moist and reddened with intermittent areas of superficial tissue loss. Recommend to provide quality skin care and hygiene; apply the magic barrier cream TID and alternate between uses of the moisture barrier creams.  Right anterior foot with red moist tissue, small area with pink tissue present as well. Cleansed with NS. Recommend daily dressing changes with silver impregnated hydrofiber to the wound beds.  Tissue to bilateral lower legs pink and intact; tissue is supple and appropriately hydrated. Will recommend to continue quality skin care and hygiene; apply purple top moisturizer to the legs and feet.     Impression: skin tear to right  foot, yonis-rectal irritation     Short term goals:  Regain skin integrity, moisture prevention, daily dressing changes, topical treatment    Karon Bolton RN    11/17/2023    12:08 EST

## 2023-11-17 NOTE — PLAN OF CARE
Goal Outcome Evaluation:  Plan of Care Reviewed With: patient        Progress: no change  Outcome Evaluation: AOx4, call light and personal items within reach.Skin barrier applied, education about q2 turning. I/C of BM at times, bedpan used. Urinal at bedside. Able to turn self. Tolorated wound care. Will con't POC.

## 2023-11-17 NOTE — PLAN OF CARE
Goal Outcome Evaluation:  Plan of Care Reviewed With: patient        Progress: no change  Outcome Evaluation: Alert and oriented x4; able to make needs known to staff. Allowing staff to turn him at times; buttock and yonis area remain reddened from incontinence. Magic barrier cream applied after incontinent episodes. Continue to administer Norco and Baclofen for pain as requested per MAR for complaints of left hip pain. Call light within reach; care plan ongoing.

## 2023-11-17 NOTE — THERAPY TREATMENT NOTE
SNF - Physical Therapy Treatment Note  KEO Dominguez     Patient Name: Jose Shaikh  : 1960  MRN: [id number]  Today's Date: 2023      Visit Dx:    ICD-10-CM ICD-9-CM   1. Difficulty in walking  R26.2 719.7     Patient Active Problem List   Diagnosis    Diabetic ulcer of left heel associated with type 2 DM    Acute osteomyelitis of left calcaneus     Diabetic ulcer of left heel associated with type 2 DM    Diabetic ulcer of right midfoot associated with type 2 DM    Paroxysmal atrial fibrillation    Essential hypertension    Hyperlipidemia LDL goal <100    Cellulitis and abscess of foot    High alkaline phosphatase level    Osteomyelitis    Onychomycosis    Onychocryptosis    Foot pain, bilateral    Osteomyelitis of foot, right, acute    Cellulitis of right foot    Type 2 diabetes mellitus, with long-term current use of insulin    Class 3 severe obesity due to excess calories with serious comorbidity and body mass index (BMI) of 45.0 to 49.9 in adult    Anxiety disorder, unspecified    Claustrophobia    Dependence on wheelchair    Depression, unspecified    Long term (current) use of anticoagulants    Long term (current) use of oral hypoglycemic drugs    Wound of foot    Non-prs chronic ulcer oth prt r foot limited to brkdwn skin    Orthostatic hypotension    Other chronic osteomyelitis, right ankle and foot    Personal history of nicotine dependence    Thrombocytopenia, unspecified    Unspecified open wound, right foot, initial encounter    Diabetic foot infection    Subacute osteomyelitis of right foot    Right foot pain    Sepsis    Onychomycosis    Foot pain, left    Impaired mobility and ADLs    Absence of toe of right foot    Corns and callosity    Disability of walking    Fracture    Limb swelling    Polyneuropathy    Pressure ulcer, stage 1    Shortness of breath    Generalized weakness    Debility     Past Medical History:   Diagnosis Date    Absence of toe of right foot     Acute osteomyelitis  of left calcaneus  08/18/2021    Anxiety and depression     Arthritis     Cancer     Claustrophobia     Corns and callus     Diabetic ulcer of left heel associated with type 2 DM 08/18/2021    Diabetic ulcer of left heel associated with type 2 DM 07/06/2021    Diabetic ulcer of right midfoot associated with type 2 DM 08/18/2021    Difficulty walking     Essential hypertension 08/31/2021    Hammertoe     Hyperlipidemia LDL goal <100 08/31/2021    Ingrown toenail     Obesity     Paroxysmal atrial fibrillation 08/31/2021    Polyneuropathy     Pressure ulcer, stage 1     Tinea unguium     Type 2 diabetes mellitus with polyneuropathy      Past Surgical History:   Procedure Laterality Date    CYST REMOVAL      center of back; benign    EYE SURGERY      INCISION AND DRAINAGE ABSCESS      back    INCISION AND DRAINAGE LEG Right 12/10/2021    Procedure: INCISION AND DRAINAGE LOWER EXTREMITY;  Surgeon: Ash Leyva DPM;  Location: [facility];  Service: Podiatry;  Laterality: Right;    OTHER SURGICAL HISTORY      Surgical clips left foot    TOE SURGERY Right     Removal of 5th toe    TRANS METATARSAL AMPUTATION Right 12/02/2021    Procedure: AMPUTATION TRANS METATARSAL;  Surgeon: Ash Leyva DPM;  Location: [facility];  Service: Podiatry;  Laterality: Right;    VASCULAR SURGERY      WRIST SURGERY Left     repair of injury       PT Assessment (last 12 hours)       PT Evaluation and Treatment       Row Name 11/17/23 120          Physical Therapy Time and Intention    Document Type therapy note (daily note)  -WM     Mode of Treatment individual therapy;physical therapy  -WM     Patient Effort good  -WM     Symptoms Noted During/After Treatment fatigue  -WM       Row Name 11/17/23 1202          Pain Scale: FACES Pre/Post-Treatment    Pain: FACES Scale, Pretreatment 6-->hurts even more  -WM     Posttreatment Pain Rating 6-->hurts even more  -WM     Pain Location - Side/Orientation Left  -WM     Pain  Location - hip;knee  -       Row Name 11/17/23 1202          Bed Mobility    Supine-Sit [place] (Bed Mobility) standby assist;verbal cues  -     Sit-Supine [place] (Bed Mobility) minimum assist (75% patient effort);verbal cues  -WM     Bed Mobility, Safety Issues decreased use of legs for bridging/pushing  -     Assistive Device (Bed Mobility) bed rails;head of bed elevated;overhead trapeze;leg   -       Row Name 11/17/23 1202          Sit-Stand Transfer    Sit-Stand [place] (Transfers) moderate assist (50% patient effort);2 person assist;verbal cues  -     Assistive Device (Sit-Stand Transfers) walker, front-wheeled  -     Comment, (Sit-Stand Transfer) Pt stood x 4 for 10 seconds each.  -       Row Name 11/17/23 1202          Stand-Sit Transfer    Stand-Sit [place] (Transfers) moderate assist (50% patient effort);2 person assist;verbal cues  -     Assistive Device (Stand-Sit Transfers) walker, front-wheeled  -       Row Name 11/17/23 1202          Safety Issues, Functional Mobility    Impairments Affecting Function (Mobility) balance;endurance/activity tolerance;pain;range of motion (ROM);strength  -       Row Name 11/17/23 1202          Hip (Therapeutic Exercise)    Hip AAROM (Therapeutic Exercise) bilateral;aBduction;aDduction;supine;10 repetitions;2 sets  -     Hip Isometrics (Therapeutic Exercise) bilateral;gluteal sets;supine;10 repetitions;3 second hold;2 sets  -       Row Name 11/17/23 1202          Knee (Therapeutic Exercise)    Knee AROM (Therapeutic Exercise) bilateral;SAQ (short arc quad);supine;20 repititions  -     Knee Isometrics (Therapeutic Exercise) bilateral;quad sets;supine;10 repetitions;3 second hold;2 sets  -     Knee Strengthening (Therapeutic Exercise) bilateral;SLR (straight leg raise);supine;20 repititions  Active-assisted  -       Row Name 11/17/23 1202          Ankle (Therapeutic Exercise)    Ankle AROM (Therapeutic Exercise)  bilateral;dorsiflexion;plantarflexion;supine;20 repititions  -WM       Row Name             Wound 11/01/23 1312 Left anterior second toe Blisters    Wound - Properties Group Placement Date: 11/01/23  -RASHARD Placement Time: 1312  -RASHARD Present on Original Admission: N  -RASHARD Side: Left  -RASHARD Orientation: anterior  -RASHARD Location: second toe  -RASHARD Primary Wound Type: Blisters  -RASHARD    Retired Wound - Properties Group Placement Date: 11/01/23  -RASHARD Placement Time: 1312  -RASHARD Present on Original Admission: N  -RASHARD Side: Left  -RASHARD Orientation: anterior  -RASHARD Location: second toe  -RASHARD Primary Wound Type: Blisters  -RASHARD    Retired Wound - Properties Group Date first assessed: 11/01/23  -RASHARD Time first assessed: 1312  -RASHARD Present on Original Admission: N  -RASHARD Side: Left  -RASHARD Location: second toe  -RASHARD Primary Wound Type: Blisters  -RASHARD      Row Name             Wound 11/07/23 1240 Right anterior foot    Wound - Properties Group Placement Date: 11/07/23  - Placement Time: 1240  -FH Side: Right  -FH Orientation: anterior  -FH Location: foot  -FH    Retired Wound - Properties Group Placement Date: 11/07/23  - Placement Time: 1240  -FH Side: Right  -FH Orientation: anterior  -FH Location: foot  -FH    Retired Wound - Properties Group Date first assessed: 11/07/23  - Time first assessed: 1240  -FH Side: Right  -FH Location: foot  -FH      Row Name             Wound 11/07/23 1238 Left lower arm    Wound - Properties Group Placement Date: 11/07/23  - Placement Time: 1238  -FH Side: Left  -FH Orientation: lower  -FH Location: arm  -FH    Retired Wound - Properties Group Placement Date: 11/07/23  - Placement Time: 1238  -FH Side: Left  -FH Orientation: lower  -FH Location: arm  -FH    Retired Wound - Properties Group Date first assessed: 11/07/23  - Time first assessed: 1238  -FH Side: Left  -FH Location: arm  -FH      Row Name 11/17/23 1202          Positioning and Restraints    Pre-Treatment Position in bed  -     Post Treatment  Position bed  -WM     In Bed fowlers;with OT  -WM       Row Name 11/17/23 1202          Progress Summary (PT)    Progress Toward Functional Goals (PT) progress toward functional goals is good  -WM               User Key  (r) = Recorded By, (t) = Taken By, (c) = Cosigned By      Initials Name Provider Type    Karon Jordan, RN Registered Nurse    Carlos Anguiano RN Registered Nurse    Carson Johnson PTA Physical Therapist Assistant                  Section G              Section GG                       Physical Therapy Education       Title: PT OT SLP Therapies (Done)       Topic: Physical Therapy (Done)       Point: Mobility training (Done)       Learning Progress Summary             Patient Acceptance, E,TB, VU by MOE at 11/8/2023 1341                         Point: Precautions (Done)       Learning Progress Summary             Patient Acceptance, E,TB, VU by MOE at 11/8/2023 1341                                         User Key       Initials Effective Dates Name Provider Type Discipline    MOE 06/03/21 -  Merlin Rao, PT Physical Therapist PT                  PT Recommendation and Plan     Progress Summary (PT)  Progress Toward Functional Goals (PT): progress toward functional goals is good  Daily Progress Summary (PT): Pt attempted to stand x 4, but was not able to fully extend his knees.   Outcome Measures       Row Name 11/17/23 1215 11/16/23 1100 11/15/23 1310       How much help from another person do you currently need...    Turning from your back to your side while in flat bed without using bedrails? 2  -WM 2  -MOE (r) ZT (t) MOE (c) 2  -WM    Moving from lying on back to sitting on the side of a flat bed without bedrails? 2  -WM 2  -MOE (r) ZT (t) MOE (c) 2  -WM    Moving to and from a bed to a chair (including a wheelchair)? 1  -WM 1  -MOE (r) ZT (t) MOE (c) 1  -WM    Standing up from a chair using your arms (e.g., wheelchair, bedside chair)? 1  -WM 1  -MOE (r) ZT (t) MOE (c) 1  -WM     Climbing 3-5 steps with a railing? 1  -WM 1  -MOE (r) ZT (t) MOE (c) 1  -WM    To walk in hospital room? 1  -WM 1  -MOE (r) ZT (t) MOE (c) 1  -WM    AM-PAC 6 Clicks Score (PT) 8  -WM 8  -MOE (r) ZT (t) 8  -WM    Highest Level of Mobility Goal 3 --> Sit at edge of bed  -WM 3 --> Sit at edge of bed  -MOE (r) ZT (t) 3 --> Sit at edge of bed  -WM      Row Name 11/14/23 1238             How much help from another person do you currently need...    Turning from your back to your side while in flat bed without using bedrails? 2  -WM      Moving from lying on back to sitting on the side of a flat bed without bedrails? 2  -WM      Moving to and from a bed to a chair (including a wheelchair)? 1  -WM      Standing up from a chair using your arms (e.g., wheelchair, bedside chair)? 1  -WM      Climbing 3-5 steps with a railing? 1  -WM      To walk in hospital room? 1  -WM      AM-PAC 6 Clicks Score (PT) 8  -WM      Highest Level of Mobility Goal 3 --> Sit at edge of bed  -WM                User Key  (r) = Recorded By, (t) = Taken By, (c) = Cosigned By      Initials Name Provider Type    WM Carson Inman, [place] Physical Therapist Assistant    Merlin De La O, PT Physical Therapist    Oscar Ward, PT Student PT Student                     Time Calculation:    PT Charges       Row Name 11/17/23 1158             Time Calculation    PT Received On 11/17/23  -WM         Timed Charges    13792 - PT Therapeutic Exercise Minutes 16  -WM      19040 - PT Therapeutic Activity Minutes 24  -WM         SNF Physical Therapy Minutes    Skilled Minutes- PT 40 min  -WM         Total Minutes    Timed Charges Total Minutes 40  -WM       Total Minutes 40  -WM                User Key  (r) = Recorded By, (t) = Taken By, (c) = Cosigned By      Initials Name Provider Type    WM Carson Inman PTA Physical Therapist Assistant                      PT G-Codes  Outcome Measure Options: AM-PAC 6 Clicks Daily Activity (OT), Optimal Instrument  AM-PAC 6  Clicks Score (PT): 8  AM-PAC 6 Clicks Score (OT): 14    Carson Inman, PTA  11/17/2023

## 2023-11-18 LAB
GLUCOSE BLDC GLUCOMTR-MCNC: 108 MG/DL (ref 70–99)
GLUCOSE BLDC GLUCOMTR-MCNC: 146 MG/DL (ref 70–99)
GLUCOSE BLDC GLUCOMTR-MCNC: 190 MG/DL (ref 70–99)
GLUCOSE BLDC GLUCOMTR-MCNC: 201 MG/DL (ref 70–99)

## 2023-11-18 PROCEDURE — 82948 REAGENT STRIP/BLOOD GLUCOSE: CPT

## 2023-11-18 PROCEDURE — 63710000001 INSULIN DETEMIR PER 5 UNITS: Performed by: PHYSICIAN ASSISTANT

## 2023-11-18 PROCEDURE — 97110 THERAPEUTIC EXERCISES: CPT

## 2023-11-18 PROCEDURE — 63710000001 INSULIN LISPRO (HUMAN) PER 5 UNITS: Performed by: INTERNAL MEDICINE

## 2023-11-18 RX ADMIN — HYDROCODONE BITARTRATE AND ACETAMINOPHEN 1 TABLET: 7.5; 325 TABLET ORAL at 20:07

## 2023-11-18 RX ADMIN — HYDROCODONE BITARTRATE AND ACETAMINOPHEN 1 TABLET: 7.5; 325 TABLET ORAL at 09:26

## 2023-11-18 RX ADMIN — INSULIN DETEMIR 60 UNITS: 100 INJECTION, SOLUTION SUBCUTANEOUS at 20:08

## 2023-11-18 RX ADMIN — Medication 250 MG: at 20:07

## 2023-11-18 RX ADMIN — VANCOMYCIN HYDROCHLORIDE 125 MG: 125 CAPSULE ORAL at 17:25

## 2023-11-18 RX ADMIN — HYDROCODONE BITARTRATE AND ACETAMINOPHEN 1 TABLET: 7.5; 325 TABLET ORAL at 01:38

## 2023-11-18 RX ADMIN — ATORVASTATIN CALCIUM 10 MG: 10 TABLET, FILM COATED ORAL at 20:08

## 2023-11-18 RX ADMIN — EMPAGLIFLOZIN 10 MG: 10 TABLET, FILM COATED ORAL at 10:42

## 2023-11-18 RX ADMIN — PREGABALIN 100 MG: 100 CAPSULE ORAL at 17:25

## 2023-11-18 RX ADMIN — VANCOMYCIN HYDROCHLORIDE 125 MG: 125 CAPSULE ORAL at 12:14

## 2023-11-18 RX ADMIN — PREGABALIN 100 MG: 100 CAPSULE ORAL at 20:08

## 2023-11-18 RX ADMIN — APIXABAN 5 MG: 5 TABLET, FILM COATED ORAL at 10:41

## 2023-11-18 RX ADMIN — LIDOCAINE 1 PATCH: 4 PATCH TOPICAL at 10:43

## 2023-11-18 RX ADMIN — Medication 250 MG: at 10:41

## 2023-11-18 RX ADMIN — APIXABAN 5 MG: 5 TABLET, FILM COATED ORAL at 20:08

## 2023-11-18 RX ADMIN — HYDROCORTISONE 1 APPLICATION: 1 OINTMENT TOPICAL at 20:09

## 2023-11-18 RX ADMIN — HYDROCORTISONE 1 APPLICATION: 1 OINTMENT TOPICAL at 16:30

## 2023-11-18 RX ADMIN — LISINOPRIL 2.5 MG: 2.5 TABLET ORAL at 10:50

## 2023-11-18 RX ADMIN — BACLOFEN 10 MG: 10 TABLET ORAL at 05:02

## 2023-11-18 RX ADMIN — VANCOMYCIN HYDROCHLORIDE 125 MG: 125 CAPSULE ORAL at 05:02

## 2023-11-18 RX ADMIN — INSULIN LISPRO 8 UNITS: 100 INJECTION, SOLUTION INTRAVENOUS; SUBCUTANEOUS at 12:14

## 2023-11-18 RX ADMIN — BACLOFEN 10 MG: 10 TABLET ORAL at 17:25

## 2023-11-18 RX ADMIN — INSULIN LISPRO 4 UNITS: 100 INJECTION, SOLUTION INTRAVENOUS; SUBCUTANEOUS at 20:09

## 2023-11-18 RX ADMIN — VANCOMYCIN HYDROCHLORIDE 125 MG: 125 CAPSULE ORAL at 00:12

## 2023-11-18 RX ADMIN — HYDROCORTISONE 1 APPLICATION: 1 OINTMENT TOPICAL at 10:43

## 2023-11-18 RX ADMIN — INSULIN DETEMIR 70 UNITS: 100 INJECTION, SOLUTION SUBCUTANEOUS at 10:42

## 2023-11-18 RX ADMIN — PREGABALIN 100 MG: 100 CAPSULE ORAL at 10:41

## 2023-11-18 RX ADMIN — VANCOMYCIN HYDROCHLORIDE 125 MG: 125 CAPSULE ORAL at 23:45

## 2023-11-18 RX ADMIN — CYANOCOBALAMIN TAB 500 MCG 1000 MCG: 500 TAB at 10:41

## 2023-11-18 NOTE — PLAN OF CARE
Goal Outcome Evaluation:              Outcome Evaluation: Plan of care continues at this time. Patient has been using the bedpan with soft and some liquid stool and uses urinal. Patient is alert and oriented and uses call light, patient is able to make needs known. Call light in reach.

## 2023-11-18 NOTE — THERAPY TREATMENT NOTE
SNF - Physical Therapy Progress Note  KEO Dominguez     Patient Name: Jose Shaikh  : 1960  MRN: [id number]  Today's Date: 2023      Visit Dx:     ICD-10-CM ICD-9-CM   1. Difficulty in walking  R26.2 719.7     Patient Active Problem List   Diagnosis    Diabetic ulcer of left heel associated with type 2 DM    Acute osteomyelitis of left calcaneus     Diabetic ulcer of left heel associated with type 2 DM    Diabetic ulcer of right midfoot associated with type 2 DM    Paroxysmal atrial fibrillation    Essential hypertension    Hyperlipidemia LDL goal <100    Cellulitis and abscess of foot    High alkaline phosphatase level    Osteomyelitis    Onychomycosis    Onychocryptosis    Foot pain, bilateral    Osteomyelitis of foot, right, acute    Cellulitis of right foot    Type 2 diabetes mellitus, with long-term current use of insulin    Class 3 severe obesity due to excess calories with serious comorbidity and body mass index (BMI) of 45.0 to 49.9 in adult    Anxiety disorder, unspecified    Claustrophobia    Dependence on wheelchair    Depression, unspecified    Long term (current) use of anticoagulants    Long term (current) use of oral hypoglycemic drugs    Wound of foot    Non-prs chronic ulcer oth prt r foot limited to brkdwn skin    Orthostatic hypotension    Other chronic osteomyelitis, right ankle and foot    Personal history of nicotine dependence    Thrombocytopenia, unspecified    Unspecified open wound, right foot, initial encounter    Diabetic foot infection    Subacute osteomyelitis of right foot    Right foot pain    Sepsis    Onychomycosis    Foot pain, left    Impaired mobility and ADLs    Absence of toe of right foot    Corns and callosity    Disability of walking    Fracture    Limb swelling    Polyneuropathy    Pressure ulcer, stage 1    Shortness of breath    Generalized weakness    Debility     Past Medical History:   Diagnosis Date    Absence of toe of right foot     Acute osteomyelitis  of left calcaneus  08/18/2021    Anxiety and depression     Arthritis     Cancer     Claustrophobia     Corns and callus     Diabetic ulcer of left heel associated with type 2 DM 08/18/2021    Diabetic ulcer of left heel associated with type 2 DM 07/06/2021    Diabetic ulcer of right midfoot associated with type 2 DM 08/18/2021    Difficulty walking     Essential hypertension 08/31/2021    Hammertoe     Hyperlipidemia LDL goal <100 08/31/2021    Ingrown toenail     Obesity     Paroxysmal atrial fibrillation 08/31/2021    Polyneuropathy     Pressure ulcer, stage 1     Tinea unguium     Type 2 diabetes mellitus with polyneuropathy      Past Surgical History:   Procedure Laterality Date    CYST REMOVAL      center of back; benign    EYE SURGERY      INCISION AND DRAINAGE ABSCESS      back    INCISION AND DRAINAGE LEG Right 12/10/2021    Procedure: INCISION AND DRAINAGE LOWER EXTREMITY;  Surgeon: Ash Leyva DPM;  Location: [facility];  Service: Podiatry;  Laterality: Right;    OTHER SURGICAL HISTORY      Surgical clips left foot    TOE SURGERY Right     Removal of 5th toe    TRANS METATARSAL AMPUTATION Right 12/02/2021    Procedure: AMPUTATION TRANS METATARSAL;  Surgeon: Ash Leyva DPM;  Location: [facility];  Service: Podiatry;  Laterality: Right;    VASCULAR SURGERY      WRIST SURGERY Left     repair of injury     PT Assessment (last 12 hours)       PT Evaluation and Treatment       Row Name 11/18/23 1200          Physical Therapy Time and Intention    Subjective Information complains of;weakness;pain  -CS     Document Type therapy note (daily note)  -CS     Mode of Treatment individual therapy;physical therapy  -CS     Patient Effort good  -CS     Symptoms Noted During/After Treatment fatigue;increased pain  -CS       Row Name 11/18/23 1200          Pain Scale: FACES Pre/Post-Treatment    Pain: FACES Scale, Pretreatment 4-->hurts little more  -CS     Posttreatment Pain Rating  6-->hurts even more  -CS     Pain Location - Side/Orientation Left  -CS     Pain Location - hip;knee  -CS       Row Name 11/18/23 1200          Hip (Therapeutic Exercise)    Hip AAROM (Therapeutic Exercise) bilateral;supine  x 25 reps heel slides, hip abd/add, hip IR/ER (R LE only)  -CS     Hip Isometrics (Therapeutic Exercise) bilateral;gluteal sets;supine  x25 reps  -CS       Row Name 11/18/23 1200          Knee (Therapeutic Exercise)    Knee AAROM (Therapeutic Exercise) bilateral;supine  x 25 reps SAQ's, SLR's  -CS     Knee Isometrics (Therapeutic Exercise) bilateral;quad sets;supine  x 25  -CS       Row Name 11/18/23 1200          Ankle (Therapeutic Exercise)    Ankle AROM (Therapeutic Exercise) bilateral;dorsiflexion;plantarflexion;supine;20 repititions;5 repetitions  -CS       Row Name             Wound 11/01/23 1312 Left anterior second toe Blisters    Wound - Properties Group Placement Date: 11/01/23  -RASHARD Placement Time: 1312  -RASHARD Present on Original Admission: N  -RASHARD Side: Left  -RASHARD Orientation: anterior  -RASHARD Location: second toe  -RASHARD Primary Wound Type: Blisters  -RASHARD    Retired Wound - Properties Group Placement Date: 11/01/23  -RASHARD Placement Time: 1312  -RASHARD Present on Original Admission: N  -RASHARD Side: Left  -RASHARD Orientation: anterior  -RASHARD Location: second toe  -RASHARD Primary Wound Type: Blisters  -RASHARD    Retired Wound - Properties Group Date first assessed: 11/01/23  -RASHARD Time first assessed: 1312  -RASHARD Present on Original Admission: N  -RASHARD Side: Left  -RASHARD Location: second toe  -RASHARD Primary Wound Type: Blisters  -RASHARD      Row Name             Wound 11/07/23 1240 Right anterior foot    Wound - Properties Group Placement Date: 11/07/23  -FH Placement Time: 1240  -FH Side: Right  -FH Orientation: anterior  -FH Location: foot  -FH    Retired Wound - Properties Group Placement Date: 11/07/23  -FH Placement Time: 1240  -FH Side: Right  -FH Orientation: anterior  -FH Location: foot  -FH    Retired Wound - Properties Group  Date first assessed: 11/07/23  - Time first assessed: 1240  -FH Side: Right  -FH Location: foot  -FH      Row Name             Wound 11/07/23 1238 Left lower arm    Wound - Properties Group Placement Date: 11/07/23  - Placement Time: 1238  -FH Side: Left  -FH Orientation: lower  -FH Location: arm  -FH    Retired Wound - Properties Group Placement Date: 11/07/23  - Placement Time: 1238  -FH Side: Left  -FH Orientation: lower  -FH Location: arm  -FH    Retired Wound - Properties Group Date first assessed: 11/07/23  - Time first assessed: 1238  -FH Side: Left  -FH Location: arm  -FH      Row Name 11/18/23 1200          Positioning and Restraints    Pre-Treatment Position in bed  -CS     Post Treatment Position bed  -CS     In Bed supine;call light within reach;encouraged to call for assist  -CS       Row Name 11/18/23 1200          Progress Summary (PT)    Progress Toward Functional Goals (PT) progress toward functional goals is good  -CS               User Key  (r) = Recorded By, (t) = Taken By, (c) = Cosigned By      Initials Name Provider Type    Karon Jordan, RN Registered Nurse    Carlos Anguiano RN Registered Nurse    Ronald Huerta PTA Physical Therapist Assistant                    Physical Therapy Education       Title: PT OT SLP Therapies (Done)       Topic: Physical Therapy (Done)       Point: Mobility training (Done)       Learning Progress Summary             Patient Acceptance, E,TB, VU by MOE at 11/8/2023 1341                         Point: Precautions (Done)       Learning Progress Summary             Patient Acceptance, E,TB, VU by MOE at 11/8/2023 1341                                         User Key       Initials Effective Dates Name Provider Type Discipline    MOE 06/03/21 -  Merlin Rao, PT Physical Therapist PT                  PT Recommendation and Plan     Progress Summary (PT)  Progress Toward Functional Goals (PT): progress toward functional goals is good    Outcome Measures       Row Name 11/18/23 1200 11/17/23 1215 11/16/23 1100       How much help from another person do you currently need...    Turning from your back to your side while in flat bed without using bedrails? 2  -CS 2  -WM 2  -MOE (r) ZT (t) MOE (c)    Moving from lying on back to sitting on the side of a flat bed without bedrails? 2  -CS 2  -WM 2  -MOE (r) ZT (t) MOE (c)    Moving to and from a bed to a chair (including a wheelchair)? 1  -CS 1  -WM 1  -MOE (r) ZT (t) MOE (c)    Standing up from a chair using your arms (e.g., wheelchair, bedside chair)? 1  -CS 1  -WM 1  -MOE (r) ZT (t) MOE (c)    Climbing 3-5 steps with a railing? 1  -CS 1  -WM 1  -MOE (r) ZT (t) MOE (c)    To walk in hospital room? 1  -CS 1  -WM 1  -MOE (r) ZT (t) MOE (c)    AM-PAC 6 Clicks Score (PT) 8  -CS 8  -WM 8  -MOE (r) ZT (t)    Highest Level of Mobility Goal 3 --> Sit at edge of bed  -CS 3 --> Sit at edge of bed  -WM 3 --> Sit at edge of bed  -MOE (r) ZT (t)       Functional Assessment    Outcome Measure Options AM-PAC 6 Clicks Basic Mobility (PT)  -CS -- --      Row Name 11/15/23 1310             How much help from another person do you currently need...    Turning from your back to your side while in flat bed without using bedrails? 2  -WM      Moving from lying on back to sitting on the side of a flat bed without bedrails? 2  -WM      Moving to and from a bed to a chair (including a wheelchair)? 1  -WM      Standing up from a chair using your arms (e.g., wheelchair, bedside chair)? 1  -WM      Climbing 3-5 steps with a railing? 1  -WM      To walk in hospital room? 1  -WM      AM-PAC 6 Clicks Score (PT) 8  -WM      Highest Level of Mobility Goal 3 --> Sit at edge of bed  -WM                User Key  (r) = Recorded By, (t) = Taken By, (c) = Cosigned By      Initials Name Provider Type    Carson Johnson, PTA Physical Therapist Assistant    Merlin De La O, PT Physical Therapist    Ronald Huerta PTA Physical Therapist Assistant     ZT Oscar Shields, PT Student PT Student                     Time Calculation:    PT Charges       Row Name 11/18/23 1256             Time Calculation    Start Time 0830  -CS      PT Received On 11/18/23  -CS         Timed Charges    73811 - PT Therapeutic Exercise Minutes 23  -CS         SNF Physical Therapy Minutes    Skilled Minutes- PT 23 min  -CS         Total Minutes    Timed Charges Total Minutes 23  -CS       Total Minutes 23  -CS                User Key  (r) = Recorded By, (t) = Taken By, (c) = Cosigned By      Initials Name Provider Type    CS Ronald Fabian PTA Physical Therapist Assistant                  Therapy Charges for Today       Code Description Service Date Service Provider Modifiers Qty    05495587879 HC PT THER PROC EA 15 MIN 11/18/2023 Ronald Fabian PTA GP 2            PT G-Codes  Outcome Measure Options: AM-PAC 6 Clicks Basic Mobility (PT)  AM-PAC 6 Clicks Score (PT): 8  AM-PAC 6 Clicks Score (OT): 14    Ronald Fabian PTA  11/18/2023

## 2023-11-18 NOTE — PLAN OF CARE
Goal Outcome Evaluation:  Plan of Care Reviewed With: patient           Outcome Evaluation: AO x 4 and VSS. He is a 1 - 2 assist to the bsc and able to turn himself. He also uses the urinal at night. He has been using the bedpan for bowel movements tonight and has been calling beforehand.  His stools have been formed all night. He has been medicated for pain x 2. Will continue with his plan of care. Call light and personal items within reach.

## 2023-11-19 LAB
GLUCOSE BLDC GLUCOMTR-MCNC: 124 MG/DL (ref 70–99)
GLUCOSE BLDC GLUCOMTR-MCNC: 136 MG/DL (ref 70–99)
GLUCOSE BLDC GLUCOMTR-MCNC: 162 MG/DL (ref 70–99)
GLUCOSE BLDC GLUCOMTR-MCNC: 173 MG/DL (ref 70–99)

## 2023-11-19 PROCEDURE — 63710000001 INSULIN DETEMIR PER 5 UNITS: Performed by: PHYSICIAN ASSISTANT

## 2023-11-19 PROCEDURE — 82948 REAGENT STRIP/BLOOD GLUCOSE: CPT

## 2023-11-19 PROCEDURE — 63710000001 INSULIN LISPRO (HUMAN) PER 5 UNITS: Performed by: INTERNAL MEDICINE

## 2023-11-19 RX ADMIN — HYDROCODONE BITARTRATE AND ACETAMINOPHEN 1 TABLET: 7.5; 325 TABLET ORAL at 03:53

## 2023-11-19 RX ADMIN — PREGABALIN 100 MG: 100 CAPSULE ORAL at 16:43

## 2023-11-19 RX ADMIN — APIXABAN 5 MG: 5 TABLET, FILM COATED ORAL at 09:04

## 2023-11-19 RX ADMIN — HYDROCORTISONE 1 APPLICATION: 1 OINTMENT TOPICAL at 09:08

## 2023-11-19 RX ADMIN — INSULIN DETEMIR 70 UNITS: 100 INJECTION, SOLUTION SUBCUTANEOUS at 09:03

## 2023-11-19 RX ADMIN — HYDROCODONE BITARTRATE AND ACETAMINOPHEN 1 TABLET: 7.5; 325 TABLET ORAL at 11:42

## 2023-11-19 RX ADMIN — HYDROCORTISONE 1 APPLICATION: 1 OINTMENT TOPICAL at 20:56

## 2023-11-19 RX ADMIN — BACLOFEN 10 MG: 10 TABLET ORAL at 01:06

## 2023-11-19 RX ADMIN — VANCOMYCIN HYDROCHLORIDE 125 MG: 125 CAPSULE ORAL at 23:21

## 2023-11-19 RX ADMIN — HYDROCODONE BITARTRATE AND ACETAMINOPHEN 1 TABLET: 7.5; 325 TABLET ORAL at 21:03

## 2023-11-19 RX ADMIN — PREGABALIN 100 MG: 100 CAPSULE ORAL at 20:55

## 2023-11-19 RX ADMIN — APIXABAN 5 MG: 5 TABLET, FILM COATED ORAL at 20:55

## 2023-11-19 RX ADMIN — HYDROCORTISONE 1 APPLICATION: 1 OINTMENT TOPICAL at 16:44

## 2023-11-19 RX ADMIN — INSULIN LISPRO 4 UNITS: 100 INJECTION, SOLUTION INTRAVENOUS; SUBCUTANEOUS at 21:03

## 2023-11-19 RX ADMIN — ATORVASTATIN CALCIUM 10 MG: 10 TABLET, FILM COATED ORAL at 20:54

## 2023-11-19 RX ADMIN — VANCOMYCIN HYDROCHLORIDE 125 MG: 125 CAPSULE ORAL at 05:21

## 2023-11-19 RX ADMIN — INSULIN LISPRO 4 UNITS: 100 INJECTION, SOLUTION INTRAVENOUS; SUBCUTANEOUS at 11:42

## 2023-11-19 RX ADMIN — VANCOMYCIN HYDROCHLORIDE 125 MG: 125 CAPSULE ORAL at 17:37

## 2023-11-19 RX ADMIN — INSULIN DETEMIR 60 UNITS: 100 INJECTION, SOLUTION SUBCUTANEOUS at 21:03

## 2023-11-19 RX ADMIN — PREGABALIN 100 MG: 100 CAPSULE ORAL at 09:04

## 2023-11-19 RX ADMIN — LISINOPRIL 2.5 MG: 2.5 TABLET ORAL at 09:04

## 2023-11-19 RX ADMIN — VANCOMYCIN HYDROCHLORIDE 125 MG: 125 CAPSULE ORAL at 11:42

## 2023-11-19 RX ADMIN — Medication 250 MG: at 20:55

## 2023-11-19 RX ADMIN — CYANOCOBALAMIN TAB 500 MCG 1000 MCG: 500 TAB at 09:04

## 2023-11-19 RX ADMIN — Medication 250 MG: at 09:04

## 2023-11-19 RX ADMIN — EMPAGLIFLOZIN 10 MG: 10 TABLET, FILM COATED ORAL at 09:04

## 2023-11-19 NOTE — PLAN OF CARE
Goal Outcome Evaluation:   Alert and oriented and pleasant with staff this shift. Total assist for transfers and ambulation. X1 c/o pain requiring PRN pain medication, with relief of symptoms noted. Noted improvement in extremity range of motion this shift. Sitting up in bed, call light in reach.

## 2023-11-19 NOTE — PLAN OF CARE
Goal Outcome Evaluation:  Plan of Care Reviewed With: patient      Pt is AO x 4 and VSS. He is a 1 -2 assist on the bedpan. He c/o pain and discomfort x 2 and was medicated. He remains is enteric isolation for C-diff but his stool has been more formed.  Will continue with his plan of care. Call light and personal items within reach.

## 2023-11-20 LAB
GLUCOSE BLDC GLUCOMTR-MCNC: 115 MG/DL (ref 70–99)
GLUCOSE BLDC GLUCOMTR-MCNC: 142 MG/DL (ref 70–99)
GLUCOSE BLDC GLUCOMTR-MCNC: 154 MG/DL (ref 70–99)
GLUCOSE BLDC GLUCOMTR-MCNC: 196 MG/DL (ref 70–99)

## 2023-11-20 PROCEDURE — 82948 REAGENT STRIP/BLOOD GLUCOSE: CPT

## 2023-11-20 PROCEDURE — 97530 THERAPEUTIC ACTIVITIES: CPT

## 2023-11-20 PROCEDURE — 63710000001 INSULIN DETEMIR PER 5 UNITS: Performed by: PHYSICIAN ASSISTANT

## 2023-11-20 PROCEDURE — 97110 THERAPEUTIC EXERCISES: CPT

## 2023-11-20 PROCEDURE — 63710000001 INSULIN LISPRO (HUMAN) PER 5 UNITS: Performed by: INTERNAL MEDICINE

## 2023-11-20 RX ORDER — FERROUS SULFATE 325(65) MG
325 TABLET ORAL
Status: DISPENSED | OUTPATIENT
Start: 2023-11-21

## 2023-11-20 RX ADMIN — INSULIN DETEMIR 60 UNITS: 100 INJECTION, SOLUTION SUBCUTANEOUS at 21:36

## 2023-11-20 RX ADMIN — CYANOCOBALAMIN TAB 500 MCG 1000 MCG: 500 TAB at 08:15

## 2023-11-20 RX ADMIN — INSULIN DETEMIR 70 UNITS: 100 INJECTION, SOLUTION SUBCUTANEOUS at 08:16

## 2023-11-20 RX ADMIN — VANCOMYCIN HYDROCHLORIDE 125 MG: 125 CAPSULE ORAL at 17:12

## 2023-11-20 RX ADMIN — PREGABALIN 100 MG: 100 CAPSULE ORAL at 08:16

## 2023-11-20 RX ADMIN — VANCOMYCIN HYDROCHLORIDE 125 MG: 125 CAPSULE ORAL at 23:20

## 2023-11-20 RX ADMIN — PREGABALIN 100 MG: 100 CAPSULE ORAL at 16:57

## 2023-11-20 RX ADMIN — VANCOMYCIN HYDROCHLORIDE 125 MG: 125 CAPSULE ORAL at 05:35

## 2023-11-20 RX ADMIN — Medication 250 MG: at 08:15

## 2023-11-20 RX ADMIN — LISINOPRIL 2.5 MG: 2.5 TABLET ORAL at 08:16

## 2023-11-20 RX ADMIN — VANCOMYCIN HYDROCHLORIDE 125 MG: 125 CAPSULE ORAL at 11:57

## 2023-11-20 RX ADMIN — APIXABAN 5 MG: 5 TABLET, FILM COATED ORAL at 08:16

## 2023-11-20 RX ADMIN — BACLOFEN 10 MG: 10 TABLET ORAL at 17:53

## 2023-11-20 RX ADMIN — INSULIN LISPRO 4 UNITS: 100 INJECTION, SOLUTION INTRAVENOUS; SUBCUTANEOUS at 21:36

## 2023-11-20 RX ADMIN — HYDROCORTISONE 1 APPLICATION: 1 OINTMENT TOPICAL at 16:57

## 2023-11-20 RX ADMIN — EMPAGLIFLOZIN 10 MG: 10 TABLET, FILM COATED ORAL at 08:16

## 2023-11-20 RX ADMIN — HYDROCORTISONE 1 APPLICATION: 1 OINTMENT TOPICAL at 21:37

## 2023-11-20 RX ADMIN — HYDROCORTISONE 1 APPLICATION: 1 OINTMENT TOPICAL at 09:03

## 2023-11-20 RX ADMIN — APIXABAN 5 MG: 5 TABLET, FILM COATED ORAL at 21:36

## 2023-11-20 RX ADMIN — HYDROCODONE BITARTRATE AND ACETAMINOPHEN 1 TABLET: 7.5; 325 TABLET ORAL at 11:59

## 2023-11-20 RX ADMIN — BACLOFEN 10 MG: 10 TABLET ORAL at 05:35

## 2023-11-20 RX ADMIN — PREGABALIN 100 MG: 100 CAPSULE ORAL at 21:36

## 2023-11-20 RX ADMIN — INSULIN LISPRO 4 UNITS: 100 INJECTION, SOLUTION INTRAVENOUS; SUBCUTANEOUS at 11:57

## 2023-11-20 RX ADMIN — Medication 250 MG: at 21:36

## 2023-11-20 RX ADMIN — HYDROCODONE BITARTRATE AND ACETAMINOPHEN 1 TABLET: 7.5; 325 TABLET ORAL at 21:43

## 2023-11-20 RX ADMIN — ATORVASTATIN CALCIUM 10 MG: 10 TABLET, FILM COATED ORAL at 21:36

## 2023-11-20 NOTE — THERAPY TREATMENT NOTE
Patient Name: Jose Shaikh  : 1960    MRN: [id number]                              Today's Date: 2023       Admit Date: 2023    Visit Dx:     ICD-10-CM ICD-9-CM   1. Difficulty in walking  R26.2 719.7     Patient Active Problem List   Diagnosis    Diabetic ulcer of left heel associated with type 2 DM    Acute osteomyelitis of left calcaneus     Diabetic ulcer of left heel associated with type 2 DM    Diabetic ulcer of right midfoot associated with type 2 DM    Paroxysmal atrial fibrillation    Essential hypertension    Hyperlipidemia LDL goal <100    Cellulitis and abscess of foot    High alkaline phosphatase level    Osteomyelitis    Onychomycosis    Onychocryptosis    Foot pain, bilateral    Osteomyelitis of foot, right, acute    Cellulitis of right foot    Type 2 diabetes mellitus, with long-term current use of insulin    Class 3 severe obesity due to excess calories with serious comorbidity and body mass index (BMI) of 45.0 to 49.9 in adult    Anxiety disorder, unspecified    Claustrophobia    Dependence on wheelchair    Depression, unspecified    Long term (current) use of anticoagulants    Long term (current) use of oral hypoglycemic drugs    Wound of foot    Non-prs chronic ulcer oth prt r foot limited to brkdwn skin    Orthostatic hypotension    Other chronic osteomyelitis, right ankle and foot    Personal history of nicotine dependence    Thrombocytopenia, unspecified    Unspecified open wound, right foot, initial encounter    Diabetic foot infection    Subacute osteomyelitis of right foot    Right foot pain    Sepsis    Onychomycosis    Foot pain, left    Impaired mobility and ADLs    Absence of toe of right foot    Corns and callosity    Disability of walking    Fracture    Limb swelling    Polyneuropathy    Pressure ulcer, stage 1    Shortness of breath    Generalized weakness    Debility     Past Medical History:   Diagnosis Date    Absence of toe of right foot     Acute  osteomyelitis of left calcaneus  08/18/2021    Anxiety and depression     Arthritis     Cancer     Claustrophobia     Corns and callus     Diabetic ulcer of left heel associated with type 2 DM 08/18/2021    Diabetic ulcer of left heel associated with type 2 DM 07/06/2021    Diabetic ulcer of right midfoot associated with type 2 DM 08/18/2021    Difficulty walking     Essential hypertension 08/31/2021    Hammertoe     Hyperlipidemia LDL goal <100 08/31/2021    Ingrown toenail     Obesity     Paroxysmal atrial fibrillation 08/31/2021    Polyneuropathy     Pressure ulcer, stage 1     Tinea unguium     Type 2 diabetes mellitus with polyneuropathy      Past Surgical History:   Procedure Laterality Date    CYST REMOVAL      center of back; benign    EYE SURGERY      INCISION AND DRAINAGE ABSCESS      back    INCISION AND DRAINAGE LEG Right 12/10/2021    Procedure: INCISION AND DRAINAGE LOWER EXTREMITY;  Surgeon: Ash Leyva DPM;  Location: [facility];  Service: Podiatry;  Laterality: Right;    OTHER SURGICAL HISTORY      Surgical clips left foot    TOE SURGERY Right     Removal of 5th toe    TRANS METATARSAL AMPUTATION Right 12/02/2021    Procedure: AMPUTATION TRANS METATARSAL;  Surgeon: Ash Leyva DPM;  Location: [facility] OR;  Service: Podiatry;  Laterality: Right;    VASCULAR SURGERY      WRIST SURGERY Left     repair of injury      General Information       Row Name 11/20/23 1112          OT Time and Intention    Document Type therapy note (daily note)  -PG     Mode of Treatment individual therapy;occupational therapy  -PG               User Key  (r) = Recorded By, (t) = Taken By, (c) = Cosigned By      Initials Name Provider Type    PG Kodak Matos OT Occupational Therapist                     Mobility/ADL's       Row Name 11/20/23 1117          Sit-Stand Transfer    Sit-Stand [place] (Transfers) moderate assist (50% patient effort);2 person assist;verbal cues  -PG     Assistive  Device (Sit-Stand Transfers) walker, front-wheeled  -PG       Row Name 11/20/23 1112          Stand-Sit Transfer    Stand-Sit [place] (Transfers) moderate assist (50% patient effort);2 person assist;verbal cues  -PG     Assistive Device (Stand-Sit Transfers) walker, front-wheeled  -PG               User Key  (r) = Recorded By, (t) = Taken By, (c) = Cosigned By      Initials Name Provider Type    Kodak Velazco OT Occupational Therapist                   Obj/Interventions       Row Name 11/20/23 1113          Shoulder (Therapeutic Exercise)    Shoulder Strengthening (Therapeutic Exercise) resistance band;5 lb free weight;green;20 repititions  -PG       Row Name 11/20/23 1113          Elbow/Forearm (Therapeutic Exercise)    Elbow/Forearm Strengthening (Therapeutic Exercise) resistance band;green;20 repititions;5 lb free weight  -PG       Row Name 11/20/23 1113          Motor Skills    Therapeutic Exercise shoulder;elbow/forearm  -PG               User Key  (r) = Recorded By, (t) = Taken By, (c) = Cosigned By      Initials Name Provider Type    PG Kodak Matos OT Occupational Therapist                   Goals/Plan    No documentation.                  Clinical Impression       Row Name 11/20/23 1113          Plan of Care Review    Plan of Care Reviewed With patient  -PG     Progress no change  -PG               User Key  (r) = Recorded By, (t) = Taken By, (c) = Cosigned By      Initials Name Provider Type    Kodak Velazco OT Occupational Therapist                   Outcome Measures       Row Name 11/20/23 1114          How much help from another is currently needed...    Putting on and taking off regular lower body clothing? 1  -PG     Bathing (including washing, rinsing, and drying) 2  -PG     Toileting (which includes using toilet bed pan or urinal) 1  -PG     Putting on and taking off regular upper body clothing 3  -PG     Taking care of personal grooming (such as brushing teeth) 3  -PG     Eating  meals 4  -PG     AM-PAC 6 Clicks Score (OT) 14  -PG       Row Name 11/20/23 1114          Functional Assessment    Outcome Measure Options AM-PAC 6 Clicks Daily Activity (OT);Optimal Instrument  -PG       Row Name 11/20/23 1114          Optimal Instrument    Optimal Instrument Optimal - 3  -PG     Bending/Stooping 4  -PG     Standing 4  -PG     Reaching 2  -PG               User Key  (r) = Recorded By, (t) = Taken By, (c) = Cosigned By      Initials Name Provider Type    PG Kodak Matos OT Occupational Therapist                    Occupational Therapy Education       Title: PT OT SLP Therapies (Done)       Topic: Occupational Therapy (Done)       Point: ADL training (Done)       Description:   Instruct learner(s) on proper safety adaptation and remediation techniques during self care or transfers.   Instruct in proper use of assistive devices.                  Learning Progress Summary             Patient Acceptance, E,D, DU by PG at 11/7/2023 0932                         Point: Home exercise program (Done)       Description:   Instruct learner(s) on appropriate technique for monitoring, assisting and/or progressing therapeutic exercises/activities.                  Learning Progress Summary             Patient Acceptance, E,D, DU by PG at 11/7/2023 0932                         Point: Precautions (Done)       Description:   Instruct learner(s) on prescribed precautions during self-care and functional transfers.                  Learning Progress Summary             Patient Acceptance, E,D, DU by PG at 11/7/2023 0932                         Point: Body mechanics (Done)       Description:   Instruct learner(s) on proper positioning and spine alignment during self-care, functional mobility activities and/or exercises.                  Learning Progress Summary             Patient Acceptance, E,D, DU by PG at 11/7/2023 0932                                         User Key       Initials Effective Dates Name Provider  Type Discipline    PG 06/16/21 -  Kodak Matos OT Occupational Therapist OT                  OT Recommendation and Plan  Planned Therapy Interventions (OT): activity tolerance training, BADL retraining, strengthening exercise, transfer/mobility retraining, patient/caregiver education/training, occupation/activity based interventions  Therapy Frequency (OT): 5 times/wk  Plan of Care Review  Plan of Care Reviewed With: patient  Progress: no change  Outcome Evaluation: Patient presents with limitations affecting strength, activity tolerance, and balance impacting patient's ability to return home safely and independently.  The skills of a therapist will be required to safely and effectively implement the following treatment plan to restore maximal level of function     Time Calculation:   Evaluation Complexity (OT)  Review Occupational Profile/Medical/Therapy History Complexity: brief/low complexity  Assessment, Occupational Performance/Identification of Deficit Complexity: 3-5 performance deficits  Overall Complexity of Evaluation (OT): low complexity     Time Calculation- OT       Row Name 11/20/23 1115             Time Calculation- OT    OT Received On 11/20/23  -PG      OT Goal Re-Cert Due Date 11/24/23  -PG         Timed Charges    74940 - OT Therapeutic Exercise Minutes 20  -PG      26183 - OT Therapeutic Activity Minutes 15  -PG         Total Minutes    Timed Charges Total Minutes 35  -PG       Total Minutes 35  -PG                User Key  (r) = Recorded By, (t) = Taken By, (c) = Cosigned By      Initials Name Provider Type    PG Kodak Matos OT Occupational Therapist                  Therapy Charges for Today       Code Description Service Date Service Provider Modifiers Qty    00814884361 HC OT THER PROC EA 15 MIN 11/20/2023 Kodak Matos OT GO 1    03967008164 HC OT THERAPEUTIC ACT EA 15 MIN 11/20/2023 Kodak Matos OT GO 1                 Kodak Matos OT  11/20/2023

## 2023-11-20 NOTE — PLAN OF CARE
Goal Outcome Evaluation:  Plan of Care Reviewed With: patient        Progress: no change  Outcome Evaluation: Alert and oriented; able to call for assist as needed. Incontinent of stool x1; able to call for bedpan x2. Norco administered for pain x1 per request. Requested multiple snacks throughout the night. Moving in bed more independently with trapeze bar. Continue plan of care; care plan ongoing.

## 2023-11-20 NOTE — PLAN OF CARE
Goal Outcome Evaluation:  Plan of Care Reviewed With: patient        Progress: no change  Outcome Evaluation: Resident alert,oriented, able to make needws iknown to staff. Transfers with therapy to stand at bedside, remains in bed throughout shift for staff. has been using bed pan for continence but still has incont episodes of stool. continues to be in isolation for c-diff. Receives po vancomycin. dressing to rt foot changed as ordered. foul odor to dressing/wound today. wound macerated, pale white and pale pink, glassy. tolerated dressing change well. upset at dinner, only received one serving of grapes but had requested 2 servings. dietary office notified. medicated with prn Norco x1, medicated with prn baclofen x1. Little effectiveness per resident. will continue current plan of care.

## 2023-11-20 NOTE — THERAPY TREATMENT NOTE
SNF - Physical Therapy Treatment Note  KEO Dominguez     Patient Name: Jose Shaikh  : 1960  MRN: [id number]  Today's Date: 2023      Visit Dx:    ICD-10-CM ICD-9-CM   1. Difficulty in walking  R26.2 719.7     Patient Active Problem List   Diagnosis    Diabetic ulcer of left heel associated with type 2 DM    Acute osteomyelitis of left calcaneus     Diabetic ulcer of left heel associated with type 2 DM    Diabetic ulcer of right midfoot associated with type 2 DM    Paroxysmal atrial fibrillation    Essential hypertension    Hyperlipidemia LDL goal <100    Cellulitis and abscess of foot    High alkaline phosphatase level    Osteomyelitis    Onychomycosis    Onychocryptosis    Foot pain, bilateral    Osteomyelitis of foot, right, acute    Cellulitis of right foot    Type 2 diabetes mellitus, with long-term current use of insulin    Class 3 severe obesity due to excess calories with serious comorbidity and body mass index (BMI) of 45.0 to 49.9 in adult    Anxiety disorder, unspecified    Claustrophobia    Dependence on wheelchair    Depression, unspecified    Long term (current) use of anticoagulants    Long term (current) use of oral hypoglycemic drugs    Wound of foot    Non-prs chronic ulcer oth prt r foot limited to brkdwn skin    Orthostatic hypotension    Other chronic osteomyelitis, right ankle and foot    Personal history of nicotine dependence    Thrombocytopenia, unspecified    Unspecified open wound, right foot, initial encounter    Diabetic foot infection    Subacute osteomyelitis of right foot    Right foot pain    Sepsis    Onychomycosis    Foot pain, left    Impaired mobility and ADLs    Absence of toe of right foot    Corns and callosity    Disability of walking    Fracture    Limb swelling    Polyneuropathy    Pressure ulcer, stage 1    Shortness of breath    Generalized weakness    Debility     Past Medical History:   Diagnosis Date    Absence of toe of right foot     Acute osteomyelitis  of left calcaneus  08/18/2021    Anxiety and depression     Arthritis     Cancer     Claustrophobia     Corns and callus     Diabetic ulcer of left heel associated with type 2 DM 08/18/2021    Diabetic ulcer of left heel associated with type 2 DM 07/06/2021    Diabetic ulcer of right midfoot associated with type 2 DM 08/18/2021    Difficulty walking     Essential hypertension 08/31/2021    Hammertoe     Hyperlipidemia LDL goal <100 08/31/2021    Ingrown toenail     Obesity     Paroxysmal atrial fibrillation 08/31/2021    Polyneuropathy     Pressure ulcer, stage 1     Tinea unguium     Type 2 diabetes mellitus with polyneuropathy      Past Surgical History:   Procedure Laterality Date    CYST REMOVAL      center of back; benign    EYE SURGERY      INCISION AND DRAINAGE ABSCESS      back    INCISION AND DRAINAGE LEG Right 12/10/2021    Procedure: INCISION AND DRAINAGE LOWER EXTREMITY;  Surgeon: Ash Leyva DPM;  Location: [facility];  Service: Podiatry;  Laterality: Right;    OTHER SURGICAL HISTORY      Surgical clips left foot    TOE SURGERY Right     Removal of 5th toe    TRANS METATARSAL AMPUTATION Right 12/02/2021    Procedure: AMPUTATION TRANS METATARSAL;  Surgeon: Ash Leyva DPM;  Location: [facility];  Service: Podiatry;  Laterality: Right;    VASCULAR SURGERY      WRIST SURGERY Left     repair of injury       PT Assessment (last 12 hours)       PT Evaluation and Treatment       Row Name 11/20/23 1149          Physical Therapy Time and Intention    Document Type therapy note (daily note)  -WM     Mode of Treatment individual therapy;physical therapy  -WM       Row Name 11/20/23 1149          Pain Scale: FACES Pre/Post-Treatment    Pain: FACES Scale, Pretreatment 6-->hurts even more  -WM     Posttreatment Pain Rating 6-->hurts even more  -WM     Pain Location - Side/Orientation Left  -WM     Pain Location - hip;knee  -WM       Row Name 11/20/23 1149          Bed Mobility     Supine-Sit-Supine [place] (Bed Mobility) minimum assist (75% patient effort)  -     Bed Mobility, Safety Issues decreased use of legs for bridging/pushing  -     Assistive Device (Bed Mobility) bed rails;head of bed elevated;leg ;overhead trapeze  -       Row Name 11/20/23 1149          Sit-Stand Transfer    Sit-Stand [place] (Transfers) maximum assist (25% patient effort);2 person assist;verbal cues  -     Assistive Device (Sit-Stand Transfers) walker, front-wheeled  -     Comment, (Sit-Stand Transfer) Pt stood x 4 with 15 second stands  -       Row Name 11/20/23 1149          Stand-Sit Transfer    Stand-Sit [place] (Transfers) maximum assist (25% patient effort);2 person assist;verbal cues  -     Assistive Device (Stand-Sit Transfers) walker, front-wheeled  -       Row Name 11/20/23 1149          Safety Issues, Functional Mobility    Impairments Affecting Function (Mobility) balance;endurance/activity tolerance;pain;range of motion (ROM);strength  -       Row Name 11/20/23 1149          Hip (Therapeutic Exercise)    Hip Isometrics (Therapeutic Exercise) bilateral;gluteal sets;supine;10 repetitions;5 repetitions;3 second hold;2 sets  -     Hip Strengthening (Therapeutic Exercise) bilateral;aBduction;aDduction;supine;20 repititions  Manual resistance  -       Row Name 11/20/23 1149          Knee (Therapeutic Exercise)    Knee AROM (Therapeutic Exercise) right;SAQ (short arc quad);supine;20 repititions  -     Knee AAROM (Therapeutic Exercise) right;supine;10 repetitions;2 sets  SAQ  -     Knee Isometrics (Therapeutic Exercise) bilateral;quad sets;supine;10 repetitions;3 second hold;2 sets  -     Knee Strengthening (Therapeutic Exercise) bilateral;SLR (straight leg raise);supine;20 repititions  Active-assisted  -       Row Name 11/20/23 1149          Ankle (Therapeutic Exercise)    Ankle AAROM (Therapeutic Exercise) bilateral;dorsiflexion;plantarflexion;supine;10  repetitions;5 repetitions;2 sets  -WM       Row Name             Wound 11/01/23 1312 Left anterior second toe Blisters    Wound - Properties Group Placement Date: 11/01/23  -RASHARD Placement Time: 1312  -RASHARD Present on Original Admission: N  -RASHARD Side: Left  -RASHARD Orientation: anterior  -RASHARD Location: second toe  -RASHARD Primary Wound Type: Blisters  -RASHARD    Retired Wound - Properties Group Placement Date: 11/01/23  -RASHARD Placement Time: 1312  -RASHARD Present on Original Admission: N  -RASHARD Side: Left  -RASHARD Orientation: anterior  -RASHARD Location: second toe  -RASHARD Primary Wound Type: Blisters  -RASHARD    Retired Wound - Properties Group Date first assessed: 11/01/23  -RASHARD Time first assessed: 1312  -RASHARD Present on Original Admission: N  -RASHARD Side: Left  -RASHARD Location: second toe  -RASHARD Primary Wound Type: Blisters  -RASHARD      Row Name             Wound 11/07/23 1240 Right anterior foot    Wound - Properties Group Placement Date: 11/07/23  - Placement Time: 1240  -FH Side: Right  -FH Orientation: anterior  -FH Location: foot  -FH    Retired Wound - Properties Group Placement Date: 11/07/23  - Placement Time: 1240  -FH Side: Right  -FH Orientation: anterior  -FH Location: foot  -FH    Retired Wound - Properties Group Date first assessed: 11/07/23  - Time first assessed: 1240  -FH Side: Right  -FH Location: foot  -FH      Row Name             Wound 11/07/23 1238 Left lower arm    Wound - Properties Group Placement Date: 11/07/23  - Placement Time: 1238  -FH Side: Left  -FH Orientation: lower  -FH Location: arm  -FH    Retired Wound - Properties Group Placement Date: 11/07/23  - Placement Time: 1238  -FH Side: Left  -FH Orientation: lower  -FH Location: arm  -FH    Retired Wound - Properties Group Date first assessed: 11/07/23  - Time first assessed: 1238  -FH Side: Left  -FH Location: arm  -FH      Row Name 11/20/23 1149          Positioning and Restraints    Pre-Treatment Position in bed  -WM     Post Treatment Position bed  -WM     In Bed  fowlers;call light within reach;with OT  -WM       Row Name 11/20/23 1149          Progress Summary (PT)    Progress Toward Functional Goals (PT) progress toward functional goals is fair  -WM               User Key  (r) = Recorded By, (t) = Taken By, (c) = Cosigned By      Initials Name Provider Type    Karon Jordan, RN Registered Nurse    Carlos Anguiano RN Registered Nurse    Carson Johnson PTA Physical Therapist Assistant                  Section G              Section GG                       Physical Therapy Education       Title: PT OT SLP Therapies (Done)       Topic: Physical Therapy (Done)       Point: Mobility training (Done)       Learning Progress Summary             Patient Acceptance, E,TB, VU by MOE at 11/8/2023 1341                         Point: Precautions (Done)       Learning Progress Summary             Patient Acceptance, E,TB, VU by MOE at 11/8/2023 1341                                         User Key       Initials Effective Dates Name Provider Type Discipline    MOE 06/03/21 -  Merlin Rao, PT Physical Therapist PT                  PT Recommendation and Plan     Progress Summary (PT)  Progress Toward Functional Goals (PT): progress toward functional goals is fair  Daily Progress Summary (PT): Pt attempted to stand x 4, but was not able to fully extend his knees.   Outcome Measures       Row Name 11/20/23 1206 11/18/23 1200 11/17/23 1215       How much help from another person do you currently need...    Turning from your back to your side while in flat bed without using bedrails? 2  -WM 2  -CS 2  -WM    Moving from lying on back to sitting on the side of a flat bed without bedrails? 2  -WM 2  -CS 2  -WM    Moving to and from a bed to a chair (including a wheelchair)? 1  -WM 1  -CS 1  -WM    Standing up from a chair using your arms (e.g., wheelchair, bedside chair)? 1  -WM 1  -CS 1  -WM    Climbing 3-5 steps with a railing? 1  -WM 1  -CS 1  -WM    To walk in  hospital room? 1  -WM 1  -CS 1  -WM    AM-PAC 6 Clicks Score (PT) 8  -WM 8  -CS 8  -WM    Highest Level of Mobility Goal 3 --> Sit at edge of bed  -WM 3 --> Sit at edge of bed  -CS 3 --> Sit at edge of bed  -WM       Functional Assessment    Outcome Measure Options -- AM-PAC 6 Clicks Basic Mobility (PT)  -CS --              User Key  (r) = Recorded By, (t) = Taken By, (c) = Cosigned By      Initials Name Provider Type    Carson Johnson PTA Physical Therapist Assistant    Ronald Huerta PTA Physical Therapist Assistant                     Time Calculation:    PT Charges       Row Name 11/20/23 1148             Time Calculation    PT Received On 11/20/23  -WM         Timed Charges    67663 - PT Therapeutic Exercise Minutes 18  -WM      14224 - PT Therapeutic Activity Minutes 22  -WM         SNF Physical Therapy Minutes    Skilled Minutes- PT 40 min  -WM         Total Minutes    Timed Charges Total Minutes 40  -WM       Total Minutes 40  -WM                User Key  (r) = Recorded By, (t) = Taken By, (c) = Cosigned By      Initials Name Provider Type    Carson Johnson PTA Physical Therapist Assistant                      PT G-Codes  Outcome Measure Options: AM-PAC 6 Clicks Daily Activity (OT), Optimal Instrument  AM-PAC 6 Clicks Score (PT): 8  AM-PAC 6 Clicks Score (OT): 14    Carson Inman PTA  11/20/2023

## 2023-11-21 LAB
ANION GAP SERPL CALCULATED.3IONS-SCNC: 9.3 MMOL/L (ref 5–15)
ANISOCYTOSIS BLD QL: NORMAL
BASOPHILS # BLD AUTO: 0.04 10*3/MM3 (ref 0–0.2)
BASOPHILS NFR BLD AUTO: 1 % (ref 0–1.5)
BUN SERPL-MCNC: 22 MG/DL (ref 8–23)
BUN/CREAT SERPL: 45.8 (ref 7–25)
CALCIUM SPEC-SCNC: 8.2 MG/DL (ref 8.6–10.5)
CHLORIDE SERPL-SCNC: 103 MMOL/L (ref 98–107)
CO2 SERPL-SCNC: 21.7 MMOL/L (ref 22–29)
CREAT SERPL-MCNC: 0.48 MG/DL (ref 0.76–1.27)
DEPRECATED RDW RBC AUTO: 42.8 FL (ref 37–54)
EGFRCR SERPLBLD CKD-EPI 2021: 114.6 ML/MIN/1.73
EOSINOPHIL # BLD AUTO: 0.08 10*3/MM3 (ref 0–0.4)
EOSINOPHIL NFR BLD AUTO: 2.1 % (ref 0.3–6.2)
ERYTHROCYTE [DISTWIDTH] IN BLOOD BY AUTOMATED COUNT: 15.9 % (ref 12.3–15.4)
GLUCOSE BLDC GLUCOMTR-MCNC: 104 MG/DL (ref 70–99)
GLUCOSE BLDC GLUCOMTR-MCNC: 145 MG/DL (ref 70–99)
GLUCOSE BLDC GLUCOMTR-MCNC: 172 MG/DL (ref 70–99)
GLUCOSE BLDC GLUCOMTR-MCNC: 182 MG/DL (ref 70–99)
GLUCOSE SERPL-MCNC: 153 MG/DL (ref 65–99)
HCT VFR BLD AUTO: 29.3 % (ref 37.5–51)
HGB BLD-MCNC: 8.7 G/DL (ref 13–17.7)
HYPOCHROMIA BLD QL: NORMAL
IMM GRANULOCYTES # BLD AUTO: 0.01 10*3/MM3 (ref 0–0.05)
IMM GRANULOCYTES NFR BLD AUTO: 0.3 % (ref 0–0.5)
LYMPHOCYTES # BLD AUTO: 0.95 10*3/MM3 (ref 0.7–3.1)
LYMPHOCYTES NFR BLD AUTO: 24.5 % (ref 19.6–45.3)
MCH RBC QN AUTO: 22.1 PG (ref 26.6–33)
MCHC RBC AUTO-ENTMCNC: 29.7 G/DL (ref 31.5–35.7)
MCV RBC AUTO: 74.4 FL (ref 79–97)
MICROCYTES BLD QL: NORMAL
MONOCYTES # BLD AUTO: 0.56 10*3/MM3 (ref 0.1–0.9)
MONOCYTES NFR BLD AUTO: 14.4 % (ref 5–12)
NEUTROPHILS NFR BLD AUTO: 2.24 10*3/MM3 (ref 1.7–7)
NEUTROPHILS NFR BLD AUTO: 57.7 % (ref 42.7–76)
NRBC BLD AUTO-RTO: 0 /100 WBC (ref 0–0.2)
PLATELET # BLD AUTO: 157 10*3/MM3 (ref 140–450)
PMV BLD AUTO: 11.4 FL (ref 6–12)
POLYCHROMASIA BLD QL SMEAR: NORMAL
POTASSIUM SERPL-SCNC: 4.1 MMOL/L (ref 3.5–5.2)
RBC # BLD AUTO: 3.94 10*6/MM3 (ref 4.14–5.8)
SMALL PLATELETS BLD QL SMEAR: ADEQUATE
SODIUM SERPL-SCNC: 134 MMOL/L (ref 136–145)
WBC MORPH BLD: NORMAL
WBC NRBC COR # BLD AUTO: 3.88 10*3/MM3 (ref 3.4–10.8)

## 2023-11-21 PROCEDURE — 63710000001 INSULIN LISPRO (HUMAN) PER 5 UNITS: Performed by: INTERNAL MEDICINE

## 2023-11-21 PROCEDURE — 85025 COMPLETE CBC W/AUTO DIFF WBC: CPT | Performed by: PHYSICIAN ASSISTANT

## 2023-11-21 PROCEDURE — 97530 THERAPEUTIC ACTIVITIES: CPT

## 2023-11-21 PROCEDURE — 97110 THERAPEUTIC EXERCISES: CPT

## 2023-11-21 PROCEDURE — 85007 BL SMEAR W/DIFF WBC COUNT: CPT | Performed by: PHYSICIAN ASSISTANT

## 2023-11-21 PROCEDURE — 99309 SBSQ NF CARE MODERATE MDM 30: CPT | Performed by: PHYSICIAN ASSISTANT

## 2023-11-21 PROCEDURE — 63710000001 INSULIN DETEMIR PER 5 UNITS: Performed by: PHYSICIAN ASSISTANT

## 2023-11-21 PROCEDURE — 82948 REAGENT STRIP/BLOOD GLUCOSE: CPT

## 2023-11-21 PROCEDURE — 80048 BASIC METABOLIC PNL TOTAL CA: CPT | Performed by: PHYSICIAN ASSISTANT

## 2023-11-21 RX ORDER — OXYCODONE AND ACETAMINOPHEN 7.5; 325 MG/1; MG/1
1 TABLET ORAL EVERY 6 HOURS PRN
Status: DISCONTINUED | OUTPATIENT
Start: 2023-11-21 | End: 2023-12-05

## 2023-11-21 RX ADMIN — VANCOMYCIN HYDROCHLORIDE 125 MG: 125 CAPSULE ORAL at 23:45

## 2023-11-21 RX ADMIN — BACLOFEN 10 MG: 10 TABLET ORAL at 03:57

## 2023-11-21 RX ADMIN — APIXABAN 5 MG: 5 TABLET, FILM COATED ORAL at 08:13

## 2023-11-21 RX ADMIN — APIXABAN 5 MG: 5 TABLET, FILM COATED ORAL at 21:05

## 2023-11-21 RX ADMIN — VANCOMYCIN HYDROCHLORIDE 125 MG: 125 CAPSULE ORAL at 05:45

## 2023-11-21 RX ADMIN — HYDROCORTISONE 1 APPLICATION: 1 OINTMENT TOPICAL at 18:01

## 2023-11-21 RX ADMIN — ACETAMINOPHEN 650 MG: 325 TABLET ORAL at 02:08

## 2023-11-21 RX ADMIN — BACLOFEN 10 MG: 10 TABLET ORAL at 16:54

## 2023-11-21 RX ADMIN — OXYCODONE AND ACETAMINOPHEN 1 TABLET: 7.5; 325 TABLET ORAL at 18:00

## 2023-11-21 RX ADMIN — CYANOCOBALAMIN TAB 500 MCG 1000 MCG: 500 TAB at 08:13

## 2023-11-21 RX ADMIN — INSULIN LISPRO 4 UNITS: 100 INJECTION, SOLUTION INTRAVENOUS; SUBCUTANEOUS at 12:19

## 2023-11-21 RX ADMIN — LIDOCAINE 1 PATCH: 4 PATCH TOPICAL at 08:16

## 2023-11-21 RX ADMIN — EMPAGLIFLOZIN 10 MG: 10 TABLET, FILM COATED ORAL at 08:13

## 2023-11-21 RX ADMIN — INSULIN DETEMIR 60 UNITS: 100 INJECTION, SOLUTION SUBCUTANEOUS at 21:06

## 2023-11-21 RX ADMIN — FERROUS SULFATE TAB 325 MG (65 MG ELEMENTAL FE) 325 MG: 325 (65 FE) TAB at 08:13

## 2023-11-21 RX ADMIN — HYDROCODONE BITARTRATE AND ACETAMINOPHEN 1 TABLET: 7.5; 325 TABLET ORAL at 10:11

## 2023-11-21 RX ADMIN — VANCOMYCIN HYDROCHLORIDE 125 MG: 125 CAPSULE ORAL at 18:01

## 2023-11-21 RX ADMIN — PREGABALIN 100 MG: 100 CAPSULE ORAL at 08:13

## 2023-11-21 RX ADMIN — INSULIN LISPRO 4 UNITS: 100 INJECTION, SOLUTION INTRAVENOUS; SUBCUTANEOUS at 21:06

## 2023-11-21 RX ADMIN — HYDROCORTISONE 1 APPLICATION: 1 OINTMENT TOPICAL at 08:16

## 2023-11-21 RX ADMIN — INSULIN DETEMIR 70 UNITS: 100 INJECTION, SOLUTION SUBCUTANEOUS at 08:13

## 2023-11-21 RX ADMIN — VANCOMYCIN HYDROCHLORIDE 125 MG: 125 CAPSULE ORAL at 12:19

## 2023-11-21 RX ADMIN — Medication 250 MG: at 08:13

## 2023-11-21 RX ADMIN — HYDROCORTISONE 1 APPLICATION: 1 OINTMENT TOPICAL at 21:05

## 2023-11-21 RX ADMIN — Medication 250 MG: at 21:05

## 2023-11-21 RX ADMIN — PREGABALIN 100 MG: 100 CAPSULE ORAL at 21:05

## 2023-11-21 RX ADMIN — PREGABALIN 100 MG: 100 CAPSULE ORAL at 16:54

## 2023-11-21 RX ADMIN — ATORVASTATIN CALCIUM 10 MG: 10 TABLET, FILM COATED ORAL at 21:05

## 2023-11-21 NOTE — PLAN OF CARE
Goal Outcome Evaluation:  Plan of Care Reviewed With: patient        Progress: no change  Outcome Evaluation: Resident alert, oriented, able to make needs known to staff. Transfers with therapy to stand at BS, not able to walk or pivot at this time. medicated for prn spasms x 1, prn pain x1, effective. MD in for f/u visit. changes made in pain medication. Blood glucose elevated for lunch, covered with sliding scale, stable for BF and dinner. remained in bed this shift except when standing with therapy. remained continent of bowel this shift. Will cont. current plan of care.

## 2023-11-21 NOTE — PLAN OF CARE
Goal Outcome Evaluation:  Plan of Care Reviewed With: patient        Progress: no change  Outcome Evaluation: Alert and oriented x4; able to make needs known to staff. Utilizing urinal and bedpan throughout the shift with occasional incontinence of bowel. Administered Norco x1, Tylenol x1 and Baclofen x1 for left hip pain. Resident states pain medication doesn't help much but has rested between care. Resident ordered food via door dash this shift. Call light within reach; care plan ongoing.

## 2023-11-21 NOTE — THERAPY TREATMENT NOTE
Patient Name: Jose Shaikh  : 1960    MRN: [id number]                              Today's Date: 2023       Admit Date: 2023    Visit Dx:     ICD-10-CM ICD-9-CM   1. Difficulty in walking  R26.2 719.7     Patient Active Problem List   Diagnosis    Diabetic ulcer of left heel associated with type 2 DM    Acute osteomyelitis of left calcaneus     Diabetic ulcer of left heel associated with type 2 DM    Diabetic ulcer of right midfoot associated with type 2 DM    Paroxysmal atrial fibrillation    Essential hypertension    Hyperlipidemia LDL goal <100    Cellulitis and abscess of foot    High alkaline phosphatase level    Osteomyelitis    Onychomycosis    Onychocryptosis    Foot pain, bilateral    Osteomyelitis of foot, right, acute    Cellulitis of right foot    Type 2 diabetes mellitus, with long-term current use of insulin    Class 3 severe obesity due to excess calories with serious comorbidity and body mass index (BMI) of 45.0 to 49.9 in adult    Anxiety disorder, unspecified    Claustrophobia    Dependence on wheelchair    Depression, unspecified    Long term (current) use of anticoagulants    Long term (current) use of oral hypoglycemic drugs    Wound of foot    Non-prs chronic ulcer oth prt r foot limited to brkdwn skin    Orthostatic hypotension    Other chronic osteomyelitis, right ankle and foot    Personal history of nicotine dependence    Thrombocytopenia, unspecified    Unspecified open wound, right foot, initial encounter    Diabetic foot infection    Subacute osteomyelitis of right foot    Right foot pain    Sepsis    Onychomycosis    Foot pain, left    Impaired mobility and ADLs    Absence of toe of right foot    Corns and callosity    Disability of walking    Fracture    Limb swelling    Polyneuropathy    Pressure ulcer, stage 1    Shortness of breath    Generalized weakness    Debility     Past Medical History:   Diagnosis Date    Absence of toe of right foot     Acute  osteomyelitis of left calcaneus  08/18/2021    Anxiety and depression     Arthritis     Cancer     Claustrophobia     Corns and callus     Diabetic ulcer of left heel associated with type 2 DM 08/18/2021    Diabetic ulcer of left heel associated with type 2 DM 07/06/2021    Diabetic ulcer of right midfoot associated with type 2 DM 08/18/2021    Difficulty walking     Essential hypertension 08/31/2021    Hammertoe     Hyperlipidemia LDL goal <100 08/31/2021    Ingrown toenail     Obesity     Paroxysmal atrial fibrillation 08/31/2021    Polyneuropathy     Pressure ulcer, stage 1     Tinea unguium     Type 2 diabetes mellitus with polyneuropathy      Past Surgical History:   Procedure Laterality Date    CYST REMOVAL      center of back; benign    EYE SURGERY      INCISION AND DRAINAGE ABSCESS      back    INCISION AND DRAINAGE LEG Right 12/10/2021    Procedure: INCISION AND DRAINAGE LOWER EXTREMITY;  Surgeon: Ash Leyva DPM;  Location: [facility];  Service: Podiatry;  Laterality: Right;    OTHER SURGICAL HISTORY      Surgical clips left foot    TOE SURGERY Right     Removal of 5th toe    TRANS METATARSAL AMPUTATION Right 12/02/2021    Procedure: AMPUTATION TRANS METATARSAL;  Surgeon: Ash Leyva DPM;  Location: [facility] MAIN OR;  Service: Podiatry;  Laterality: Right;    VASCULAR SURGERY      WRIST SURGERY Left     repair of injury      General Information       Row Name 11/21/23 1256          OT Time and Intention    Document Type evaluation  -PG     Mode of Treatment individual therapy;occupational therapy  -PG               User Key  (r) = Recorded By, (t) = Taken By, (c) = Cosigned By      Initials Name Provider Type    PG Kodak Matos OT Occupational Therapist                     Mobility/ADL's    No documentation.                  Obj/Interventions       Row Name 11/21/23 1257          Shoulder (Therapeutic Exercise)    Shoulder (Therapeutic Exercise) strengthening exercise  -PG      Shoulder Strengthening (Therapeutic Exercise) 20 repititions;resistance band;green  -PG       Row Name 11/21/23 1257          Elbow/Forearm (Therapeutic Exercise)    Elbow/Forearm (Therapeutic Exercise) strengthening exercise  -PG     Elbow/Forearm Strengthening (Therapeutic Exercise) 20 repititions;resistance band;green;4 lb free weight  -PG       Row Name 11/21/23 1257          Motor Skills    Therapeutic Exercise shoulder;elbow/forearm  -PG               User Key  (r) = Recorded By, (t) = Taken By, (c) = Cosigned By      Initials Name Provider Type    PG Kodak Matos OT Occupational Therapist                   Goals/Plan    No documentation.                  Clinical Impression       Row Name 11/21/23 1257          Plan of Care Review    Progress no change  -PG               User Key  (r) = Recorded By, (t) = Taken By, (c) = Cosigned By      Initials Name Provider Type    PG Kodak Matos OT Occupational Therapist                   Outcome Measures       Row Name 11/21/23 1257          How much help from another is currently needed...    Putting on and taking off regular lower body clothing? 1  -PG     Bathing (including washing, rinsing, and drying) 2  -PG     Toileting (which includes using toilet bed pan or urinal) 1  -PG     Putting on and taking off regular upper body clothing 3  -PG     Taking care of personal grooming (such as brushing teeth) 3  -PG     Eating meals 4  -PG     AM-PAC 6 Clicks Score (OT) 14  -PG       Row Name 11/21/23 1224 11/21/23 1000       How much help from another person do you currently need...    Turning from your back to your side while in flat bed without using bedrails? 2  -WM 2  -FH    Moving from lying on back to sitting on the side of a flat bed without bedrails? 1  -WM 2  -FH    Moving to and from a bed to a chair (including a wheelchair)? 1  -WM 1  -FH    Standing up from a chair using your arms (e.g., wheelchair, bedside chair)? 1  -WM 1  -FH    Climbing 3-5 steps with a  railing? 1  -WM 1  -FH    To walk in hospital room? 1  -WM 1  -FH    AM-PAC 6 Clicks Score (PT) 7  -WM 8  -FH    Highest Level of Mobility Goal 2 --> Bed activities/dependent transfer  -WM 3 --> Sit at edge of bed  -FH      Row Name 11/21/23 0208          How much help from another person do you currently need...    Turning from your back to your side while in flat bed without using bedrails? 2  -TM     Moving from lying on back to sitting on the side of a flat bed without bedrails? 2  -TM     Moving to and from a bed to a chair (including a wheelchair)? 1  -TM     Standing up from a chair using your arms (e.g., wheelchair, bedside chair)? 1  -TM     Climbing 3-5 steps with a railing? 1  -TM     To walk in hospital room? 1  -TM     AM-PAC 6 Clicks Score (PT) 8  -TM     Highest Level of Mobility Goal 3 --> Sit at edge of bed  -TM       Row Name 11/21/23 1257          Functional Assessment    Outcome Measure Options AM-PAC 6 Clicks Daily Activity (OT);Optimal Instrument  -PG       Row Name 11/21/23 1257          Optimal Instrument    Optimal Instrument Optimal - 3  -PG     Bending/Stooping 5  -PG     Standing 4  -PG     Reaching 2  -PG               User Key  (r) = Recorded By, (t) = Taken By, (c) = Cosigned By      Initials Name Provider Type    TM Rylee Orellana, RN Registered Nurse     Carlos Cagle, RN Registered Nurse     Carson Inman PTA Physical Therapist Assistant    Kodak Velazco OT Occupational Therapist                    Occupational Therapy Education       Title: PT OT SLP Therapies (Done)       Topic: Occupational Therapy (Done)       Point: ADL training (Done)       Description:   Instruct learner(s) on proper safety adaptation and remediation techniques during self care or transfers.   Instruct in proper use of assistive devices.                  Learning Progress Summary             Patient Acceptance, E,D, DU by PG at 11/7/2023 2659                         Point: Home exercise  program (Done)       Description:   Instruct learner(s) on appropriate technique for monitoring, assisting and/or progressing therapeutic exercises/activities.                  Learning Progress Summary             Patient Acceptance, E,D, DU by PG at 11/7/2023 0932                         Point: Precautions (Done)       Description:   Instruct learner(s) on prescribed precautions during self-care and functional transfers.                  Learning Progress Summary             Patient Acceptance, E,D, DU by PG at 11/7/2023 0932                         Point: Body mechanics (Done)       Description:   Instruct learner(s) on proper positioning and spine alignment during self-care, functional mobility activities and/or exercises.                  Learning Progress Summary             Patient Acceptance, E,D, DU by PG at 11/7/2023 0932                                         User Key       Initials Effective Dates Name Provider Type Discipline    PG 06/16/21 -  Kodak Matos OT Occupational Therapist OT                  OT Recommendation and Plan  Planned Therapy Interventions (OT): activity tolerance training, BADL retraining, strengthening exercise, transfer/mobility retraining, patient/caregiver education/training, occupation/activity based interventions  Therapy Frequency (OT): 5 times/wk  Plan of Care Review  Plan of Care Reviewed With: patient  Progress: no change  Outcome Evaluation: Patient presents with limitations affecting strength, activity tolerance, and balance impacting patient's ability to return home safely and independently.  The skills of a therapist will be required to safely and effectively implement the following treatment plan to restore maximal level of function     Time Calculation:   Evaluation Complexity (OT)  Review Occupational Profile/Medical/Therapy History Complexity: brief/low complexity  Assessment, Occupational Performance/Identification of Deficit Complexity: 3-5 performance  deficits  Overall Complexity of Evaluation (OT): low complexity     Time Calculation- OT       Row Name 11/21/23 1258             Time Calculation- OT    OT Received On 11/21/23  -PG         Timed Charges    74503 - OT Therapeutic Exercise Minutes 40  -PG         SNF Occupational Therapy Minutes    Skilled Minutes- OT 40 min  -PG         Total Minutes    Timed Charges Total Minutes 40  -PG       Total Minutes 40  -PG                User Key  (r) = Recorded By, (t) = Taken By, (c) = Cosigned By      Initials Name Provider Type    PG Kodak Matos, OT Occupational Therapist                  Therapy Charges for Today       Code Description Service Date Service Provider Modifiers Qty    31149053708 HC OT THER PROC EA 15 MIN 11/20/2023 Kodak Matos OT GO 1    14769179228 HC OT THERAPEUTIC ACT EA 15 MIN 11/20/2023 Kodak Matos OT GO 1    87133933091 HC OT THER PROC EA 15 MIN 11/21/2023 Kodak Matos OT GO 3                 Kodak Matos OT  11/21/2023

## 2023-11-21 NOTE — PROGRESS NOTES
[facility]   Hospitalist Progress Note  Date: 2023  Patient Name: Jose Shaikh  : 1960  MRN: [id number]  Date of admission: 2023      Subjective   Subjective     Chief Complaint: Weakness    Summary: Jose Shaikh is a 63 y.o. male  initially hospitalized on 9/10/2023, prolonged hospitalization for treatment of management of generalized weakness, deconditioning, with acute issues of diarrhea, C. difficile negative.  Diarrhea improved.  Had small hematoma after ambulating on his foot.  Unfortunately with exhaustive efforts to try to place this gentleman after 39 days of hospitalization, we are unable to find a facility that will accept him.  He has no further acute needs or requirements for inpatient monitoring and management, his labs and vitals are stable, he is tolerating oral intake, and has been refusing turns and repositionings and other interventions with nursing staff despite recommendations.  Patient discharged in hemodynamically stable condition on 10/19/2023 to follow-up with PCP within 1 week. Unfortunately we cannot solve all of his social issues during this hospitalization due to lack of resources and participation from the patient's perspective despite all our efforts.  Patient has a financial means of making arrangements at home.  Patient appealed his discharge.  Lost his appeal.  LCD: 10/20/23, PFL beginning: 10/21/23 @ 12pm.  Due to an inability to place the patient in a.m. nursing home he has been placed in our skilled nursing facility until arrangements can be made or until he is able to care for himself.      Interval Followup: 2023    Diarrhea/loose stools improving  Still has good appetite  Remains hemodynamically stable  Glucose reasonably controlled on current regimen  Tolerating oral vancomycin for C. Difficile  Still has low back/hip pains.  Does not think Norco helping much.  Has been participating in therapy.    Review of systems: All systems reviewed  negative septa following: Patient complains of generalized weakness fatigue and diarrhea  Objective   Objective     Vitals:   Temp:  [97.7 °F (36.5 °C)-97.9 °F (36.6 °C)] 97.7 °F (36.5 °C)  Heart Rate:  [83-85] 83  Resp:  [16-18] 18  BP: (118-121)/(54-64) 118/54    Physical Exam   Constitutional: Awake alert oriented no acute distress  Respiratory: No wheeze  GI: Abdomen: Obese  Neuro/psych:  Calm mood.  No dysarthria.    Result Review    Result Review:  I have personally reviewed the results from the time of this admission to 11/21/2023 15:13 EST and agree with these findings:  []  Laboratory  []  Microbiology  []  Radiology  []  EKG/Telemetry   []  Cardiology/Vascular   []  Pathology  []  Old records  []  Other:    Assessment & Plan   Assessment / Plan   Assessment:  Hospital-acquired weakness  C. difficile diarrhea  Generalized weakness  Deconditioning  DM (Kami Garcia and PCP)  HTN  PAF (on Eliquis; previously seen Dr. Duval)  Morbid obesity with BMI 44  Debility with wheelchair dependence  Hx of severe left hip pain  Severe degenerative joint disease of lower extremities  Hx L calcaneus osteomyelitis  Hx R transmetatarsal  Chronic bilateral foot wounds with right transmetatarsal amputation, healing by secondary intention (wound care clinic; podiatrist Gordon)  Thrombocytopenia, resolved      Plan:    Change Norco to Percocet.  Discussed w patient to use judiciously, so that he can participate in PT  Continue oral vancomycin for C difficile associated diarrhea.    Monitor hemodynamics and avoid hypotension/dehydration.  Discontinue Lomotil  Continue with probiotics  Discontinue Pepcid  Continue other treatment as ordered  PT OT  Discussed with RN    DVT prophylaxis:  Medical DVT prophylaxis orders are present.    CODE STATUS:   Code Status (Patient has no pulse and is not breathing): CPR (Attempt to Resuscitate)  Medical Interventions (Patient has pulse or is breathing): Full Support

## 2023-11-21 NOTE — THERAPY TREATMENT NOTE
SNF - Physical Therapy Treatment Note  [facility]     Patient Name: Jose Shaikh  : 1960  MRN: [id number]  Today's Date: 2023      Visit Dx:    ICD-10-CM ICD-9-CM   1. Difficulty in walking  R26.2 719.7     Patient Active Problem List   Diagnosis    Diabetic ulcer of left heel associated with type 2 DM    Acute osteomyelitis of left calcaneus     Diabetic ulcer of left heel associated with type 2 DM    Diabetic ulcer of right midfoot associated with type 2 DM    Paroxysmal atrial fibrillation    Essential hypertension    Hyperlipidemia LDL goal <100    Cellulitis and abscess of foot    High alkaline phosphatase level    Osteomyelitis    Onychomycosis    Onychocryptosis    Foot pain, bilateral    Osteomyelitis of foot, right, acute    Cellulitis of right foot    Type 2 diabetes mellitus, with long-term current use of insulin    Class 3 severe obesity due to excess calories with serious comorbidity and body mass index (BMI) of 45.0 to 49.9 in adult    Anxiety disorder, unspecified    Claustrophobia    Dependence on wheelchair    Depression, unspecified    Long term (current) use of anticoagulants    Long term (current) use of oral hypoglycemic drugs    Wound of foot    Non-prs chronic ulcer oth prt r foot limited to brkdwn skin    Orthostatic hypotension    Other chronic osteomyelitis, right ankle and foot    Personal history of nicotine dependence    Thrombocytopenia, unspecified    Unspecified open wound, right foot, initial encounter    Diabetic foot infection    Subacute osteomyelitis of right foot    Right foot pain    Sepsis    Onychomycosis    Foot pain, left    Impaired mobility and ADLs    Absence of toe of right foot    Corns and callosity    Disability of walking    Fracture    Limb swelling    Polyneuropathy    Pressure ulcer, stage 1    Shortness of breath    Generalized weakness    Debility     Past Medical History:   Diagnosis Date    Absence of toe of right foot     Acute osteomyelitis  of left calcaneus  08/18/2021    Anxiety and depression     Arthritis     Cancer     Claustrophobia     Corns and callus     Diabetic ulcer of left heel associated with type 2 DM 08/18/2021    Diabetic ulcer of left heel associated with type 2 DM 07/06/2021    Diabetic ulcer of right midfoot associated with type 2 DM 08/18/2021    Difficulty walking     Essential hypertension 08/31/2021    Hammertoe     Hyperlipidemia LDL goal <100 08/31/2021    Ingrown toenail     Obesity     Paroxysmal atrial fibrillation 08/31/2021    Polyneuropathy     Pressure ulcer, stage 1     Tinea unguium     Type 2 diabetes mellitus with polyneuropathy      Past Surgical History:   Procedure Laterality Date    CYST REMOVAL      center of back; benign    EYE SURGERY      INCISION AND DRAINAGE ABSCESS      back    INCISION AND DRAINAGE LEG Right 12/10/2021    Procedure: INCISION AND DRAINAGE LOWER EXTREMITY;  Surgeon: Ash Leyva DPM;  Location: [facility];  Service: Podiatry;  Laterality: Right;    OTHER SURGICAL HISTORY      Surgical clips left foot    TOE SURGERY Right     Removal of 5th toe    TRANS METATARSAL AMPUTATION Right 12/02/2021    Procedure: AMPUTATION TRANS METATARSAL;  Surgeon: Ash Leyva DPM;  Location: [facility];  Service: Podiatry;  Laterality: Right;    VASCULAR SURGERY      WRIST SURGERY Left     repair of injury       PT Assessment (last 12 hours)       PT Evaluation and Treatment       Row Name 11/21/23 1214          Physical Therapy Time and Intention    Subjective Information complains of;pain  -WM     Document Type therapy note (daily note)  -WM     Mode of Treatment individual therapy;physical therapy  -WM     Patient Effort good  -WM     Symptoms Noted During/After Treatment fatigue;increased pain  -WM       Row Name 11/21/23 1212          Pain    Pretreatment Pain Rating 5/10  -WM     Posttreatment Pain Rating 10/10  -WM     Pain Location - Side/Orientation Left  -WM     Pain  Location - hip;knee  -     Additional Documentation --  Pt has increased pain in LLE iwith any movement.  -       Row Name 11/21/23 1215          Bed Mobility    Comment, (Bed Mobility) Pt was unable to transfer secondary to increased pain in LLE.  -       Row Name 11/21/23 1215          Hip (Therapeutic Exercise)    Hip AAROM (Therapeutic Exercise) right;aBduction;aDduction;supine;10 repetitions;2 sets  -       Row Name 11/21/23 1215          Knee (Therapeutic Exercise)    Knee AROM (Therapeutic Exercise) right;SAQ (short arc quad);supine;20 repititions  -     Knee Strengthening (Therapeutic Exercise) right;SLR (straight leg raise);supine;20 repititions  Active-assisted  -       Row Name 11/21/23 1215          Ankle (Therapeutic Exercise)    Ankle AROM (Therapeutic Exercise) bilateral;dorsiflexion;plantarflexion;supine;20 repititions  -       Row Name             Wound 11/01/23 1312 Left anterior second toe Blisters    Wound - Properties Group Placement Date: 11/01/23  -RASHARD Placement Time: 1312  -RASHARD Present on Original Admission: N  -RASHARD Side: Left  -RASHARD Orientation: anterior  -RASHARD Location: second toe  -RASHARD Primary Wound Type: Blisters  -RASHARD    Retired Wound - Properties Group Placement Date: 11/01/23  -RASHARD Placement Time: 1312  -RASHARD Present on Original Admission: N  -RASHARD Side: Left  -RASHARD Orientation: anterior  -RASHARD Location: second toe  -RASHARD Primary Wound Type: Blisters  -RASHARD    Retired Wound - Properties Group Date first assessed: 11/01/23  -RASHARD Time first assessed: 1312  -RASHARD Present on Original Admission: N  -RASHARD Side: Left  -RASHARD Location: second toe  -RASHARD Primary Wound Type: Blisters  -RASHARD      Row Name             Wound 11/07/23 1240 Right anterior foot    Wound - Properties Group Placement Date: 11/07/23  -FH Placement Time: 1240  -FH Side: Right  -FH Orientation: anterior  -FH Location: foot  -FH    Retired Wound - Properties Group Placement Date: 11/07/23  -FH Placement Time: 1240  -FH Side: Right  -FH  Orientation: anterior  -FH Location: foot  -FH    Retired Wound - Properties Group Date first assessed: 11/07/23  -FH Time first assessed: 1240  -FH Side: Right  -FH Location: foot  -FH      Row Name             Wound 11/07/23 1238 Left lower arm    Wound - Properties Group Placement Date: 11/07/23  - Placement Time: 1238  -FH Side: Left  -FH Orientation: lower  -FH Location: arm  -FH    Retired Wound - Properties Group Placement Date: 11/07/23  - Placement Time: 1238  -FH Side: Left  -FH Orientation: lower  -FH Location: arm  -FH    Retired Wound - Properties Group Date first assessed: 11/07/23  - Time first assessed: 1238  -FH Side: Left  -FH Location: arm  -FH      Row Name 11/21/23 1215          Positioning and Restraints    Pre-Treatment Position in bed  -WM     Post Treatment Position bed  -WM     In Bed fowlers;call light within reach;encouraged to call for assist  -WM       Row Name 11/21/23 1215          Progress Summary (PT)    Progress Toward Functional Goals (PT) progress toward functional goals is fair  -WM     Daily Progress Summary (PT) Pt has increased pain in LLE and was unable to tolerate LLE exercises or transfers  -WM               User Key  (r) = Recorded By, (t) = Taken By, (c) = Cosigned By      Initials Name Provider Type    Karon Jordan, RN Registered Nurse    Carlos Anguiano RN Registered Nurse    Carson Johnson PTA Physical Therapist Assistant                  Section G              Section GG                       Physical Therapy Education       Title: PT OT SLP Therapies (Done)       Topic: Physical Therapy (Done)       Point: Mobility training (Done)       Learning Progress Summary             Patient Acceptance, E,TB, VU by MOE at 11/8/2023 1341                         Point: Precautions (Done)       Learning Progress Summary             Patient Acceptance, E,TB, VU by MOE at 11/8/2023 1341                                         User Key       Initials  Effective Dates Name Provider Type Discipline    MOE 06/03/21 -  Merlin Rao, PT Physical Therapist PT                  PT Recommendation and Plan     Progress Summary (PT)  Progress Toward Functional Goals (PT): progress toward functional goals is fair  Daily Progress Summary (PT): Pt has increased pain in LLE and was unable to tolerate LLE exercises or transfers   Outcome Measures       Row Name 11/21/23 1224 11/20/23 1206          How much help from another person do you currently need...    Turning from your back to your side while in flat bed without using bedrails? 2  -WM 2  -WM     Moving from lying on back to sitting on the side of a flat bed without bedrails? 1  -WM 2  -WM     Moving to and from a bed to a chair (including a wheelchair)? 1  -WM 1  -WM     Standing up from a chair using your arms (e.g., wheelchair, bedside chair)? 1  -WM 1  -WM     Climbing 3-5 steps with a railing? 1  -WM 1  -WM     To walk in hospital room? 1  -WM 1  -WM     AM-PAC 6 Clicks Score (PT) 7  -WM 8  -WM     Highest Level of Mobility Goal 2 --> Bed activities/dependent transfer  -WM 3 --> Sit at edge of bed  -WM               User Key  (r) = Recorded By, (t) = Taken By, (c) = Cosigned By      Initials Name Provider Type    Carson Johnson PTA Physical Therapist Assistant                     Time Calculation:    PT Charges       Row Name 11/21/23 1215             Time Calculation    PT Received On 11/21/23  -WM         Timed Charges    06892 - PT Therapeutic Exercise Minutes 14  -WM      53925 - PT Therapeutic Activity Minutes 10  -WM         SNF Physical Therapy Minutes    Skilled Minutes- PT 24 min  -WM         Total Minutes    Timed Charges Total Minutes 24  -WM       Total Minutes 24  -WM                User Key  (r) = Recorded By, (t) = Taken By, (c) = Cosigned By      Initials Name Provider Type    Carson Johnson PTA Physical Therapist Assistant                  Therapy Charges for Today       Code Description  Service Date Service Provider Modifiers Qty    13457533044 HC PT THER PROC EA 15 MIN 11/20/2023 Carson Inman, PTA GP 1    04861437390 HC PT THERAPEUTIC ACT EA 15 MIN 11/20/2023 Carson Inman, CURTIS GP 2            PT G-Codes  Outcome Measure Options: AM-PAC 6 Clicks Daily Activity (OT), Optimal Instrument  AM-PAC 6 Clicks Score (PT): 7  AM-PAC 6 Clicks Score (OT): 14    Carson Inman PTA  11/21/2023

## 2023-11-22 LAB
GLUCOSE BLDC GLUCOMTR-MCNC: 103 MG/DL (ref 70–99)
GLUCOSE BLDC GLUCOMTR-MCNC: 133 MG/DL (ref 70–99)
GLUCOSE BLDC GLUCOMTR-MCNC: 181 MG/DL (ref 70–99)
GLUCOSE BLDC GLUCOMTR-MCNC: 193 MG/DL (ref 70–99)

## 2023-11-22 PROCEDURE — 82948 REAGENT STRIP/BLOOD GLUCOSE: CPT

## 2023-11-22 PROCEDURE — 97530 THERAPEUTIC ACTIVITIES: CPT

## 2023-11-22 PROCEDURE — 63710000001 INSULIN DETEMIR PER 5 UNITS: Performed by: PHYSICIAN ASSISTANT

## 2023-11-22 PROCEDURE — 63710000001 INSULIN LISPRO (HUMAN) PER 5 UNITS: Performed by: INTERNAL MEDICINE

## 2023-11-22 PROCEDURE — 97110 THERAPEUTIC EXERCISES: CPT

## 2023-11-22 RX ADMIN — VANCOMYCIN HYDROCHLORIDE 125 MG: 125 CAPSULE ORAL at 23:25

## 2023-11-22 RX ADMIN — INSULIN LISPRO 4 UNITS: 100 INJECTION, SOLUTION INTRAVENOUS; SUBCUTANEOUS at 20:18

## 2023-11-22 RX ADMIN — PREGABALIN 100 MG: 100 CAPSULE ORAL at 16:59

## 2023-11-22 RX ADMIN — CYANOCOBALAMIN TAB 500 MCG 1000 MCG: 500 TAB at 09:36

## 2023-11-22 RX ADMIN — VANCOMYCIN HYDROCHLORIDE 125 MG: 125 CAPSULE ORAL at 12:00

## 2023-11-22 RX ADMIN — LISINOPRIL 2.5 MG: 2.5 TABLET ORAL at 09:34

## 2023-11-22 RX ADMIN — OXYCODONE AND ACETAMINOPHEN 1 TABLET: 7.5; 325 TABLET ORAL at 21:16

## 2023-11-22 RX ADMIN — INSULIN DETEMIR 70 UNITS: 100 INJECTION, SOLUTION SUBCUTANEOUS at 09:37

## 2023-11-22 RX ADMIN — PREGABALIN 100 MG: 100 CAPSULE ORAL at 20:18

## 2023-11-22 RX ADMIN — FERROUS SULFATE TAB 325 MG (65 MG ELEMENTAL FE) 325 MG: 325 (65 FE) TAB at 09:35

## 2023-11-22 RX ADMIN — INSULIN DETEMIR 60 UNITS: 100 INJECTION, SOLUTION SUBCUTANEOUS at 20:18

## 2023-11-22 RX ADMIN — Medication 250 MG: at 09:35

## 2023-11-22 RX ADMIN — HYDROCORTISONE 1 APPLICATION: 1 OINTMENT TOPICAL at 20:19

## 2023-11-22 RX ADMIN — HYDROCORTISONE 1 APPLICATION: 1 OINTMENT TOPICAL at 09:36

## 2023-11-22 RX ADMIN — BACLOFEN 10 MG: 10 TABLET ORAL at 06:20

## 2023-11-22 RX ADMIN — VANCOMYCIN HYDROCHLORIDE 125 MG: 125 CAPSULE ORAL at 17:42

## 2023-11-22 RX ADMIN — INSULIN LISPRO 4 UNITS: 100 INJECTION, SOLUTION INTRAVENOUS; SUBCUTANEOUS at 17:42

## 2023-11-22 RX ADMIN — VANCOMYCIN HYDROCHLORIDE 125 MG: 125 CAPSULE ORAL at 05:27

## 2023-11-22 RX ADMIN — LIDOCAINE 1 PATCH: 4 PATCH TOPICAL at 09:36

## 2023-11-22 RX ADMIN — ATORVASTATIN CALCIUM 10 MG: 10 TABLET, FILM COATED ORAL at 20:18

## 2023-11-22 RX ADMIN — EMPAGLIFLOZIN 10 MG: 10 TABLET, FILM COATED ORAL at 09:35

## 2023-11-22 RX ADMIN — APIXABAN 5 MG: 5 TABLET, FILM COATED ORAL at 20:18

## 2023-11-22 RX ADMIN — PREGABALIN 100 MG: 100 CAPSULE ORAL at 09:35

## 2023-11-22 RX ADMIN — Medication 250 MG: at 20:18

## 2023-11-22 RX ADMIN — HYDROCORTISONE 1 APPLICATION: 1 OINTMENT TOPICAL at 16:59

## 2023-11-22 RX ADMIN — APIXABAN 5 MG: 5 TABLET, FILM COATED ORAL at 09:35

## 2023-11-22 RX ADMIN — OXYCODONE AND ACETAMINOPHEN 1 TABLET: 7.5; 325 TABLET ORAL at 01:04

## 2023-11-22 RX ADMIN — OXYCODONE AND ACETAMINOPHEN 1 TABLET: 7.5; 325 TABLET ORAL at 10:00

## 2023-11-22 NOTE — THERAPY TREATMENT NOTE
SNF - Physical Therapy Treatment Note  KEO Dominguez     Patient Name: Jose Shaikh  : 1960  MRN: [id number]  Today's Date: 2023      Visit Dx:    ICD-10-CM ICD-9-CM   1. Difficulty in walking  R26.2 719.7     Patient Active Problem List   Diagnosis    Diabetic ulcer of left heel associated with type 2 DM    Acute osteomyelitis of left calcaneus     Diabetic ulcer of left heel associated with type 2 DM    Diabetic ulcer of right midfoot associated with type 2 DM    Paroxysmal atrial fibrillation    Essential hypertension    Hyperlipidemia LDL goal <100    Cellulitis and abscess of foot    High alkaline phosphatase level    Osteomyelitis    Onychomycosis    Onychocryptosis    Foot pain, bilateral    Osteomyelitis of foot, right, acute    Cellulitis of right foot    Type 2 diabetes mellitus, with long-term current use of insulin    Class 3 severe obesity due to excess calories with serious comorbidity and body mass index (BMI) of 45.0 to 49.9 in adult    Anxiety disorder, unspecified    Claustrophobia    Dependence on wheelchair    Depression, unspecified    Long term (current) use of anticoagulants    Long term (current) use of oral hypoglycemic drugs    Wound of foot    Non-prs chronic ulcer oth prt r foot limited to brkdwn skin    Orthostatic hypotension    Other chronic osteomyelitis, right ankle and foot    Personal history of nicotine dependence    Thrombocytopenia, unspecified    Unspecified open wound, right foot, initial encounter    Diabetic foot infection    Subacute osteomyelitis of right foot    Right foot pain    Sepsis    Onychomycosis    Foot pain, left    Impaired mobility and ADLs    Absence of toe of right foot    Corns and callosity    Disability of walking    Fracture    Limb swelling    Polyneuropathy    Pressure ulcer, stage 1    Shortness of breath    Generalized weakness    Debility     Past Medical History:   Diagnosis Date    Absence of toe of right foot     Acute osteomyelitis  of left calcaneus  08/18/2021    Anxiety and depression     Arthritis     Cancer     Claustrophobia     Corns and callus     Diabetic ulcer of left heel associated with type 2 DM 08/18/2021    Diabetic ulcer of left heel associated with type 2 DM 07/06/2021    Diabetic ulcer of right midfoot associated with type 2 DM 08/18/2021    Difficulty walking     Essential hypertension 08/31/2021    Hammertoe     Hyperlipidemia LDL goal <100 08/31/2021    Ingrown toenail     Obesity     Paroxysmal atrial fibrillation 08/31/2021    Polyneuropathy     Pressure ulcer, stage 1     Tinea unguium     Type 2 diabetes mellitus with polyneuropathy      Past Surgical History:   Procedure Laterality Date    CYST REMOVAL      center of back; benign    EYE SURGERY      INCISION AND DRAINAGE ABSCESS      back    INCISION AND DRAINAGE LEG Right 12/10/2021    Procedure: INCISION AND DRAINAGE LOWER EXTREMITY;  Surgeon: Ash Leyva DPM;  Location: [facility];  Service: Podiatry;  Laterality: Right;    OTHER SURGICAL HISTORY      Surgical clips left foot    TOE SURGERY Right     Removal of 5th toe    TRANS METATARSAL AMPUTATION Right 12/02/2021    Procedure: AMPUTATION TRANS METATARSAL;  Surgeon: Ash Leyva DPM;  Location: [facility];  Service: Podiatry;  Laterality: Right;    VASCULAR SURGERY      WRIST SURGERY Left     repair of injury       PT Assessment (last 12 hours)       PT Evaluation and Treatment       Row Name 11/22/23 0800          Physical Therapy Time and Intention    Subjective Information complains of;pain  -WM     Document Type therapy note (daily note)  -WM     Mode of Treatment individual therapy;physical therapy  -WM     Patient Effort good  -WM     Symptoms Noted During/After Treatment fatigue  -WM       Row Name 11/22/23 0800          Pain Scale: FACES Pre/Post-Treatment    Pain: FACES Scale, Pretreatment 4-->hurts little more  -WM     Posttreatment Pain Rating 6-->hurts even more  -WM      Pain Location - Side/Orientation Left  -     Pain Location - hip;knee  -       Row Name 11/22/23 0800          Bed Mobility    Supine-Sit [place] (Bed Mobility) moderate assist (50% patient effort);verbal cues;1 person assist  -     Sit-Supine [place] (Bed Mobility) moderate assist (50% patient effort);1 person assist;1 person to manage equipment;verbal cues  -     Bed Mobility, Safety Issues decreased use of legs for bridging/pushing  -     Assistive Device (Bed Mobility) bed rails;leg ;overhead trapeze  -       Row Name 11/22/23 0800          Sit-Stand Transfer    Sit-Stand [place] (Transfers) maximum assist (25% patient effort);2 person assist  -     Assistive Device (Sit-Stand Transfers) bariatric;walker, front-wheeled  -     Comment, (Sit-Stand Transfer) Pt stood x 3 for 15 seconds.  -       Row Name 11/22/23 0800          Stand-Sit Transfer    Stand-Sit [place] (Transfers) maximum assist (25% patient effort);2 person assist;verbal cues  -     Assistive Device (Stand-Sit Transfers) bariatric;walker, front-wheeled  -       Row Name 11/22/23 0800          Safety Issues, Functional Mobility    Impairments Affecting Function (Mobility) balance;endurance/activity tolerance;pain;range of motion (ROM);strength  -       Row Name 11/22/23 0800          Hip (Therapeutic Exercise)    Hip AAROM (Therapeutic Exercise) bilateral;aBduction;aDduction;supine;10 repetitions;2 sets  -     Hip Isometrics (Therapeutic Exercise) bilateral;gluteal sets;supine;10 repetitions;3 second hold;2 sets  -       Row Name 11/22/23 0800          Knee (Therapeutic Exercise)    Knee AROM (Therapeutic Exercise) bilateral;SAQ (short arc quad);supine;20 repititions  -     Knee Isometrics (Therapeutic Exercise) bilateral;quad sets;supine;10 repetitions;3 second hold;2 sets  -     Knee Strengthening (Therapeutic Exercise) bilateral;SLR (straight leg raise);supine;20 repititions  Active-assisted   -WM       Row Name 11/22/23 0800          Ankle (Therapeutic Exercise)    Ankle AROM (Therapeutic Exercise) bilateral;dorsiflexion;plantarflexion;supine;20 repititions  -WM       Row Name             Wound 11/01/23 1312 Left anterior second toe Blisters    Wound - Properties Group Placement Date: 11/01/23  -RASHARD Placement Time: 1312  -RASHARD Present on Original Admission: N  -RASHARD Side: Left  -RASHARD Orientation: anterior  -RASHARD Location: second toe  -RASHARD Primary Wound Type: Blisters  -RASHARD    Retired Wound - Properties Group Placement Date: 11/01/23  -RASHARD Placement Time: 1312  -RASHARD Present on Original Admission: N  -RASHARD Side: Left  -RASHARD Orientation: anterior  -RASHARD Location: second toe  -RASHARD Primary Wound Type: Blisters  -RASHARD    Retired Wound - Properties Group Date first assessed: 11/01/23  -RASHARD Time first assessed: 1312  -RASHARD Present on Original Admission: N  -RASHARD Side: Left  -RASHARD Location: second toe  -RASHARD Primary Wound Type: Blisters  -RASHARD      Row Name             Wound 11/07/23 1240 Right anterior foot    Wound - Properties Group Placement Date: 11/07/23  - Placement Time: 1240  -FH Side: Right  -FH Orientation: anterior  -FH Location: foot  -FH    Retired Wound - Properties Group Placement Date: 11/07/23  - Placement Time: 1240  -FH Side: Right  -FH Orientation: anterior  -FH Location: foot  -FH    Retired Wound - Properties Group Date first assessed: 11/07/23  - Time first assessed: 1240  -FH Side: Right  -FH Location: foot  -FH      Row Name             Wound 11/07/23 1238 Left lower arm    Wound - Properties Group Placement Date: 11/07/23  - Placement Time: 1238  -FH Side: Left  -FH Orientation: lower  -FH Location: arm  -FH    Retired Wound - Properties Group Placement Date: 11/07/23  - Placement Time: 1238  -FH Side: Left  -FH Orientation: lower  -FH Location: arm  -FH    Retired Wound - Properties Group Date first assessed: 11/07/23  - Time first assessed: 1238  -FH Side: Left  -FH Location: arm  -FH      Row Name  11/22/23 0800          Positioning and Restraints    Pre-Treatment Position in bed  -WM     Post Treatment Position bed  -WM     In Bed fowlers;call light within reach;encouraged to call for assist  -WM       Row Name 11/22/23 0800          Progress Summary (PT)    Progress Toward Functional Goals (PT) progress toward functional goals is good  -WM               User Key  (r) = Recorded By, (t) = Taken By, (c) = Cosigned By      Initials Name Provider Type    Karon Jordan, RN Registered Nurse    Carlos Anguiano RN Registered Nurse    Carson Johnson PTA Physical Therapist Assistant                  Section G              Section GG                       Physical Therapy Education       Title: PT OT SLP Therapies (Done)       Topic: Physical Therapy (Done)       Point: Mobility training (Done)       Learning Progress Summary             Patient Acceptance, E,TB, VU by MOE at 11/8/2023 1341                         Point: Precautions (Done)       Learning Progress Summary             Patient Acceptance, E,TB, VU by MOE at 11/8/2023 1341                                         User Key       Initials Effective Dates Name Provider Type Discipline    MOE 06/03/21 -  Merlin Rao, PT Physical Therapist PT                  PT Recommendation and Plan     Progress Summary (PT)  Progress Toward Functional Goals (PT): progress toward functional goals is good  Daily Progress Summary (PT): Pt has increased pain in LLE and was unable to tolerate LLE exercises or transfers   Outcome Measures       Row Name 11/22/23 0832 11/21/23 1224 11/20/23 1206       How much help from another person do you currently need...    Turning from your back to your side while in flat bed without using bedrails? 2  -WM 2  -WM 2  -WM    Moving from lying on back to sitting on the side of a flat bed without bedrails? 2  -WM 1  -WM 2  -WM    Moving to and from a bed to a chair (including a wheelchair)? 1  -WM 1  -WM 1  -WM     Standing up from a chair using your arms (e.g., wheelchair, bedside chair)? 1  -WM 1  -WM 1  -WM    Climbing 3-5 steps with a railing? 1  -WM 1  -WM 1  -WM    To walk in hospital room? 1  -WM 1  -WM 1  -WM    AM-PAC 6 Clicks Score (PT) 8  -WM 7  -WM 8  -WM    Highest Level of Mobility Goal 3 --> Sit at edge of bed  -WM 2 --> Bed activities/dependent transfer  -WM 3 --> Sit at edge of bed  -WM              User Key  (r) = Recorded By, (t) = Taken By, (c) = Cosigned By      Initials Name Provider Type     Carson Inman PTA Physical Therapist Assistant                     Time Calculation:    PT Charges       Row Name 11/22/23 0815             Time Calculation    PT Received On 11/22/23  -WM         Timed Charges    87187 - PT Therapeutic Exercise Minutes 15  -WM      39276 - PT Therapeutic Activity Minutes 24  -WM         SNF Physical Therapy Minutes    Skilled Minutes- PT 39 min  -WM         Total Minutes    Timed Charges Total Minutes 39  -WM       Total Minutes 39  -WM                User Key  (r) = Recorded By, (t) = Taken By, (c) = Cosigned By      Initials Name Provider Type    Carson Johnson PTA Physical Therapist Assistant                  Therapy Charges for Today       Code Description Service Date Service Provider Modifiers Qty    12254887098 HC PT THER PROC EA 15 MIN 11/21/2023 Carson Inman PTA GP 1    02686490845 HC PT THERAPEUTIC ACT EA 15 MIN 11/21/2023 Carson Inman PTA GP 1            PT G-Codes  Outcome Measure Options: AM-PAC 6 Clicks Daily Activity (OT), Optimal Instrument  AM-PAC 6 Clicks Score (PT): 8  AM-PAC 6 Clicks Score (OT): 14    Carson Inman PTA  11/22/2023

## 2023-11-22 NOTE — PLAN OF CARE
Goal Outcome Evaluation:  Plan of Care Reviewed With: patient        Progress: no change  Outcome Evaluation: Alert and oriented; able to call for assist as needed. Rested better with Percocet for pain. Uses trapeze bar to move around in bed. Has utilized urinal this shift. Call light within reach; care plan ongoing.

## 2023-11-22 NOTE — PLAN OF CARE
Goal Outcome Evaluation:              Outcome Evaluation: Plan of care continues at this time. Patient is alert and oriented, able to make needs known, uses bedpan and urinal, call ligt in reach.

## 2023-11-22 NOTE — THERAPY EVALUATION
Patient Name: Jose Shaikh  : 1960    MRN: [id number]                              Today's Date: 2023       Admit Date: 2023    Visit Dx:     ICD-10-CM ICD-9-CM   1. Difficulty in walking  R26.2 719.7     Patient Active Problem List   Diagnosis    Diabetic ulcer of left heel associated with type 2 DM    Acute osteomyelitis of left calcaneus     Diabetic ulcer of left heel associated with type 2 DM    Diabetic ulcer of right midfoot associated with type 2 DM    Paroxysmal atrial fibrillation    Essential hypertension    Hyperlipidemia LDL goal <100    Cellulitis and abscess of foot    High alkaline phosphatase level    Osteomyelitis    Onychomycosis    Onychocryptosis    Foot pain, bilateral    Osteomyelitis of foot, right, acute    Cellulitis of right foot    Type 2 diabetes mellitus, with long-term current use of insulin    Class 3 severe obesity due to excess calories with serious comorbidity and body mass index (BMI) of 45.0 to 49.9 in adult    Anxiety disorder, unspecified    Claustrophobia    Dependence on wheelchair    Depression, unspecified    Long term (current) use of anticoagulants    Long term (current) use of oral hypoglycemic drugs    Wound of foot    Non-prs chronic ulcer oth prt r foot limited to brkdwn skin    Orthostatic hypotension    Other chronic osteomyelitis, right ankle and foot    Personal history of nicotine dependence    Thrombocytopenia, unspecified    Unspecified open wound, right foot, initial encounter    Diabetic foot infection    Subacute osteomyelitis of right foot    Right foot pain    Sepsis    Onychomycosis    Foot pain, left    Impaired mobility and ADLs    Absence of toe of right foot    Corns and callosity    Disability of walking    Fracture    Limb swelling    Polyneuropathy    Pressure ulcer, stage 1    Shortness of breath    Generalized weakness    Debility     Past Medical History:   Diagnosis Date    Absence of toe of right foot     Acute  osteomyelitis of left calcaneus  08/18/2021    Anxiety and depression     Arthritis     Cancer     Claustrophobia     Corns and callus     Diabetic ulcer of left heel associated with type 2 DM 08/18/2021    Diabetic ulcer of left heel associated with type 2 DM 07/06/2021    Diabetic ulcer of right midfoot associated with type 2 DM 08/18/2021    Difficulty walking     Essential hypertension 08/31/2021    Hammertoe     Hyperlipidemia LDL goal <100 08/31/2021    Ingrown toenail     Obesity     Paroxysmal atrial fibrillation 08/31/2021    Polyneuropathy     Pressure ulcer, stage 1     Tinea unguium     Type 2 diabetes mellitus with polyneuropathy      Past Surgical History:   Procedure Laterality Date    CYST REMOVAL      center of back; benign    EYE SURGERY      INCISION AND DRAINAGE ABSCESS      back    INCISION AND DRAINAGE LEG Right 12/10/2021    Procedure: INCISION AND DRAINAGE LOWER EXTREMITY;  Surgeon: Ash Leyva DPM;  Location: [facility];  Service: Podiatry;  Laterality: Right;    OTHER SURGICAL HISTORY      Surgical clips left foot    TOE SURGERY Right     Removal of 5th toe    TRANS METATARSAL AMPUTATION Right 12/02/2021    Procedure: AMPUTATION TRANS METATARSAL;  Surgeon: Ash Leyva DPM;  Location: [facility];  Service: Podiatry;  Laterality: Right;    VASCULAR SURGERY      WRIST SURGERY Left     repair of injury      General Information       Row Name 11/22/23 0824          OT Time and Intention    Document Type therapy note (daily note)  -PG     Mode of Treatment individual therapy;occupational therapy  -PG               User Key  (r) = Recorded By, (t) = Taken By, (c) = Cosigned By      Initials Name Provider Type    PG Kodak Matos OT Occupational Therapist                     Mobility/ADL's       Row Name 11/22/23 0824          Transfers    Transfers sit-stand transfer;stand-sit transfer  -PG       Row Name 11/22/23 0824          Sit-Stand Transfer    Sit-Stand  [place] (Transfers) moderate assist (50% patient effort);verbal cues;2 person assist  -PG     Assistive Device (Sit-Stand Transfers) walker, front-wheeled  -PG       Row Name 11/22/23 0824          Stand-Sit Transfer    Stand-Sit [place] (Transfers) moderate assist (50% patient effort);2 person assist;verbal cues  -PG     Assistive Device (Stand-Sit Transfers) walker, front-wheeled  -PG               User Key  (r) = Recorded By, (t) = Taken By, (c) = Cosigned By      Initials Name Provider Type    PG Kodak Matos, BEKAH Occupational Therapist                   Obj/Interventions    No documentation.                  Goals/Plan    No documentation.                  Clinical Impression       Row Name 11/22/23 0825          Plan of Care Review    Plan of Care Reviewed With patient  -PG     Progress no change  -PG               User Key  (r) = Recorded By, (t) = Taken By, (c) = Cosigned By      Initials Name Provider Type    PG Kodak Matos, OT Occupational Therapist                   Outcome Measures       Row Name 11/22/23 0825          How much help from another is currently needed...    Putting on and taking off regular lower body clothing? 1  -PG     Bathing (including washing, rinsing, and drying) 2  -PG     Toileting (which includes using toilet bed pan or urinal) 1  -PG     Putting on and taking off regular upper body clothing 3  -PG     Taking care of personal grooming (such as brushing teeth) 3  -PG     Eating meals 4  -PG     AM-PAC 6 Clicks Score (OT) 14  -PG       Row Name 11/21/23 1907          How much help from another person do you currently need...    Turning from your back to your side while in flat bed without using bedrails? 2  -TM     Moving from lying on back to sitting on the side of a flat bed without bedrails? 1  -TM     Moving to and from a bed to a chair (including a wheelchair)? 1  -TM     Standing up from a chair using your arms (e.g., wheelchair, bedside chair)? 1  -TM     Climbing  3-5 steps with a railing? 1  -TM     To walk in hospital room? 1  -TM     AM-PAC 6 Clicks Score (PT) 7  -TM     Highest Level of Mobility Goal 2 --> Bed activities/dependent transfer  -TM       Row Name 11/22/23 0825          Functional Assessment    Outcome Measure Options AM-PAC 6 Clicks Daily Activity (OT);Optimal Instrument  -PG       Row Name 11/22/23 0825          Optimal Instrument    Optimal Instrument Optimal - 3  -PG     Bending/Stooping 5  -PG     Standing 4  -PG     Reaching 2  -PG               User Key  (r) = Recorded By, (t) = Taken By, (c) = Cosigned By      Initials Name Provider Type    TM Rylee Orellana, RN Registered Nurse    PG Kodak Matos OT Occupational Therapist                    Occupational Therapy Education       Title: PT OT SLP Therapies (Done)       Topic: Occupational Therapy (Done)       Point: ADL training (Done)       Description:   Instruct learner(s) on proper safety adaptation and remediation techniques during self care or transfers.   Instruct in proper use of assistive devices.                  Learning Progress Summary             Patient Acceptance, E,D, DU by PG at 11/7/2023 0932                         Point: Home exercise program (Done)       Description:   Instruct learner(s) on appropriate technique for monitoring, assisting and/or progressing therapeutic exercises/activities.                  Learning Progress Summary             Patient Acceptance, E,D, DU by PG at 11/7/2023 0932                         Point: Precautions (Done)       Description:   Instruct learner(s) on prescribed precautions during self-care and functional transfers.                  Learning Progress Summary             Patient Acceptance, E,D, DU by PG at 11/7/2023 0932                         Point: Body mechanics (Done)       Description:   Instruct learner(s) on proper positioning and spine alignment during self-care, functional mobility activities and/or exercises.                  Learning  Progress Summary             Patient Acceptance, E,D, DU by PG at 11/7/2023 0932                                         User Key       Initials Effective Dates Name Provider Type Discipline    PG 06/16/21 -  Kodak Matos OT Occupational Therapist OT                  OT Recommendation and Plan  Planned Therapy Interventions (OT): activity tolerance training, BADL retraining, strengthening exercise, transfer/mobility retraining, patient/caregiver education/training, occupation/activity based interventions  Therapy Frequency (OT): 5 times/wk  Plan of Care Review  Plan of Care Reviewed With: patient  Progress: no change  Outcome Evaluation: Patient presents with limitations affecting strength, activity tolerance, and balance impacting patient's ability to return home safely and independently.  The skills of a therapist will be required to safely and effectively implement the following treatment plan to restore maximal level of function     Time Calculation:   Evaluation Complexity (OT)  Review Occupational Profile/Medical/Therapy History Complexity: brief/low complexity  Assessment, Occupational Performance/Identification of Deficit Complexity: 3-5 performance deficits  Overall Complexity of Evaluation (OT): low complexity     Time Calculation- OT       Row Name 11/22/23 0826             Time Calculation- OT    OT Received On 11/22/23  -PG      OT Goal Re-Cert Due Date 11/24/23  -PG         Timed Charges    54617 - OT Therapeutic Activity Minutes 30  -PG         SNF Occupational Therapy Minutes    Skilled Minutes- OT 30 min  -PG         Total Minutes    Timed Charges Total Minutes 30  -PG       Total Minutes 30  -PG                User Key  (r) = Recorded By, (t) = Taken By, (c) = Cosigned By      Initials Name Provider Type    PG Kodak Matos OT Occupational Therapist                  Therapy Charges for Today       Code Description Service Date Service Provider Modifiers Qty    08335221920  OT THER PROC EA 15  MIN 11/21/2023 Kodak Matos, OT GO 3    [id number]  OT THERAPEUTIC ACT EA 15 MIN 11/22/2023 Kodak Matos OT GO 2                 Kodak Matos OT  11/22/2023

## 2023-11-23 LAB
GLUCOSE BLDC GLUCOMTR-MCNC: 119 MG/DL (ref 70–99)
GLUCOSE BLDC GLUCOMTR-MCNC: 135 MG/DL (ref 70–99)
GLUCOSE BLDC GLUCOMTR-MCNC: 145 MG/DL (ref 70–99)
GLUCOSE BLDC GLUCOMTR-MCNC: 146 MG/DL (ref 70–99)
GLUCOSE BLDC GLUCOMTR-MCNC: 172 MG/DL (ref 70–99)

## 2023-11-23 PROCEDURE — 99308 SBSQ NF CARE LOW MDM 20: CPT | Performed by: PHYSICIAN ASSISTANT

## 2023-11-23 PROCEDURE — 63710000001 INSULIN DETEMIR PER 5 UNITS: Performed by: PHYSICIAN ASSISTANT

## 2023-11-23 PROCEDURE — 63710000001 INSULIN LISPRO (HUMAN) PER 5 UNITS: Performed by: INTERNAL MEDICINE

## 2023-11-23 PROCEDURE — 82948 REAGENT STRIP/BLOOD GLUCOSE: CPT

## 2023-11-23 RX ADMIN — HYDROCORTISONE 1 APPLICATION: 1 OINTMENT TOPICAL at 17:11

## 2023-11-23 RX ADMIN — FERROUS SULFATE TAB 325 MG (65 MG ELEMENTAL FE) 325 MG: 325 (65 FE) TAB at 08:24

## 2023-11-23 RX ADMIN — Medication 250 MG: at 21:12

## 2023-11-23 RX ADMIN — HYDROCORTISONE 1 APPLICATION: 1 OINTMENT TOPICAL at 08:25

## 2023-11-23 RX ADMIN — HYDROCORTISONE 1 APPLICATION: 1 OINTMENT TOPICAL at 21:13

## 2023-11-23 RX ADMIN — OXYCODONE AND ACETAMINOPHEN 1 TABLET: 7.5; 325 TABLET ORAL at 04:23

## 2023-11-23 RX ADMIN — EMPAGLIFLOZIN 10 MG: 10 TABLET, FILM COATED ORAL at 08:24

## 2023-11-23 RX ADMIN — ATORVASTATIN CALCIUM 10 MG: 10 TABLET, FILM COATED ORAL at 21:12

## 2023-11-23 RX ADMIN — OXYCODONE AND ACETAMINOPHEN 1 TABLET: 7.5; 325 TABLET ORAL at 21:12

## 2023-11-23 RX ADMIN — INSULIN DETEMIR 60 UNITS: 100 INJECTION, SOLUTION SUBCUTANEOUS at 21:12

## 2023-11-23 RX ADMIN — VANCOMYCIN HYDROCHLORIDE 125 MG: 125 CAPSULE ORAL at 05:22

## 2023-11-23 RX ADMIN — APIXABAN 5 MG: 5 TABLET, FILM COATED ORAL at 08:24

## 2023-11-23 RX ADMIN — INSULIN DETEMIR 70 UNITS: 100 INJECTION, SOLUTION SUBCUTANEOUS at 08:23

## 2023-11-23 RX ADMIN — PREGABALIN 100 MG: 100 CAPSULE ORAL at 08:24

## 2023-11-23 RX ADMIN — OXYCODONE AND ACETAMINOPHEN 1 TABLET: 7.5; 325 TABLET ORAL at 10:18

## 2023-11-23 RX ADMIN — BACLOFEN 10 MG: 10 TABLET ORAL at 02:05

## 2023-11-23 RX ADMIN — LISINOPRIL 2.5 MG: 2.5 TABLET ORAL at 08:24

## 2023-11-23 RX ADMIN — Medication 250 MG: at 08:24

## 2023-11-23 RX ADMIN — CYANOCOBALAMIN TAB 500 MCG 1000 MCG: 500 TAB at 08:24

## 2023-11-23 RX ADMIN — INSULIN LISPRO 4 UNITS: 100 INJECTION, SOLUTION INTRAVENOUS; SUBCUTANEOUS at 11:58

## 2023-11-23 RX ADMIN — PREGABALIN 100 MG: 100 CAPSULE ORAL at 21:12

## 2023-11-23 RX ADMIN — APIXABAN 5 MG: 5 TABLET, FILM COATED ORAL at 21:12

## 2023-11-23 RX ADMIN — PREGABALIN 100 MG: 100 CAPSULE ORAL at 17:11

## 2023-11-23 NOTE — PLAN OF CARE
Goal Outcome Evaluation:  Plan of Care Reviewed With: patient        Progress: improving  Outcome Evaluation: Resident alert, oriented, able to make needs known to staff. Transfers with therapy to stand at BS, not able to walk or pivot at this time. medicated for prn spasms x 1, prn pain x1, effective. MD in for f/u visit. changes made in pain medication. Blood glucose elevated for lunch, covered with sliding scale, stable for BF and dinner. remained in bed this shift except when standing with therapy. remained continent of bowel this shift. Will cont. current plan of care.

## 2023-11-23 NOTE — PROGRESS NOTES
[facility]   Hospitalist Progress Note  Date: 2023  Patient Name: Jose Shaikh  : 1960  MRN: [id number]  Date of admission: 2023      Subjective   Subjective     Chief Complaint: Weakness    Summary: Jose Shaikh is a 63 y.o. male  initially hospitalized on 9/10/2023, prolonged hospitalization for treatment of management of generalized weakness, deconditioning, with acute issues of diarrhea, C. difficile negative.  Diarrhea improved.  Had small hematoma after ambulating on his foot.  Unfortunately with exhaustive efforts to try to place this gentleman after 39 days of hospitalization, we are unable to find a facility that will accept him.  He has no further acute needs or requirements for inpatient monitoring and management, his labs and vitals are stable, he is tolerating oral intake, and has been refusing turns and repositionings and other interventions with nursing staff despite recommendations.  Patient discharged in hemodynamically stable condition on 10/19/2023 to follow-up with PCP within 1 week. Unfortunately we cannot solve all of his social issues during this hospitalization due to lack of resources and participation from the patient's perspective despite all our efforts.  Patient has a financial means of making arrangements at home.  Patient appealed his discharge.  Lost his appeal.  LCD: 10/20/23, PFL beginning: 10/21/23 @ 12pm.  Due to an inability to place the patient in a.m. nursing home he has been placed in our skilled nursing facility until arrangements can be made or until he is able to care for himself.      Interval Followup: Patient seen and examined resting comfortably was able to stand yesterday will attempt walking soon per patient report.  Diarrhea improving oral vancomycin completed continue on probiotics    Review of systems: All systems reviewed negative septa following: Patient complains of generalized weakness fatigue and diarrhea  Objective   Objective      Vitals:   Temp:  [97.2 °F (36.2 °C)-97.5 °F (36.4 °C)] 97.5 °F (36.4 °C)  Heart Rate:  [78-89] 78  Resp:  [18-20] 20  BP: (101-118)/(47-50) 101/47    Physical Exam   Constitutional: Awake alert oriented no acute distress  Respiratory: No wheeze  GI: Abdomen: Obese  Neuro/psych:  Calm mood.  No dysarthria.    Result Review    Result Review:  I have personally reviewed the results from the time of this admission to 11/23/2023 13:21 EST and agree with these findings:  []  Laboratory  []  Microbiology  []  Radiology  []  EKG/Telemetry   []  Cardiology/Vascular   []  Pathology  []  Old records  []  Other:    Assessment & Plan   Assessment / Plan   Assessment:  Hospital-acquired weakness  C. difficile diarrhea  Generalized weakness  Deconditioning  DM (Kami Garcia and PCP)  HTN  PAF (on Eliquis; previously seen Dr. Duval)  Morbid obesity with BMI 44  Debility with wheelchair dependence  Hx of severe left hip pain  Severe degenerative joint disease of lower extremities  Hx L calcaneus osteomyelitis  Hx R transmetatarsal  Chronic bilateral foot wounds with right transmetatarsal amputation, healing by secondary intention (wound care clinic; podiatrist Gordon)  Thrombocytopenia, resolved      Plan:      Continue current pain medications   Completed course of oral vancomycin for C difficile associated diarrhea.    Continue probiotics  Monitor hemodynamics and avoid hypotension/dehydration.  Continue other treatment as ordered  PT OT  Discussed with RN    DVT prophylaxis:  Medical DVT prophylaxis orders are present.    CODE STATUS:   Code Status (Patient has no pulse and is not breathing): CPR (Attempt to Resuscitate)  Medical Interventions (Patient has pulse or is breathing): Full Support

## 2023-11-23 NOTE — PLAN OF CARE
Problem: Adult Inpatient Plan of Care  Goal: Absence of Hospital-Acquired Illness or Injury  Intervention: Identify and Manage Fall Risk  Recent Flowsheet Documentation  Taken 11/23/2023 0300 by oRseline Garcia LPN  Safety Promotion/Fall Prevention: safety round/check completed  Taken 11/23/2023 0103 by Roseline Garcia LPN  Safety Promotion/Fall Prevention: safety round/check completed  Taken 11/22/2023 2300 by Roseline Garcia LPN  Safety Promotion/Fall Prevention: safety round/check completed  Taken 11/22/2023 2111 by Roseline Garcia LPN  Safety Promotion/Fall Prevention: safety round/check completed  Taken 11/22/2023 1911 by Roseline Garcia LPN  Safety Promotion/Fall Prevention: safety round/check completed  Intervention: Prevent Skin Injury  Recent Flowsheet Documentation  Taken 11/23/2023 0300 by Roseline Garcia LPN  Body Position: position changed independently  Taken 11/23/2023 0103 by Roseline Garcia LPN  Body Position: patient/family refused  Taken 11/22/2023 2111 by Roseline Garcia LPN  Body Position: weight shifting  Skin Protection: skin sealant/moisture barrier applied  Taken 11/22/2023 1911 by Roseline Garcia LPN  Body Position: supine, legs elevated  Intervention: Prevent and Manage VTE (Venous Thromboembolism) Risk  Recent Flowsheet Documentation  Taken 11/22/2023 2111 by Roseline Garcia LPN  Activity Management: activity encouraged  VTE Prevention/Management: (see mar) other (see comments)  Range of Motion: active ROM (range of motion) encouraged  Intervention: Prevent Infection  Recent Flowsheet Documentation  Taken 11/22/2023 2111 by Roseline Garcia LPN  Infection Prevention: single patient room provided  Goal: Optimal Comfort and Wellbeing  Intervention: Monitor Pain and Promote Comfort  Recent Flowsheet Documentation  Taken 11/22/2023 2111 by Roseline Garcia LPN  Pain Management Interventions: see MAR  Intervention: Provide Person-Centered Care  Recent Flowsheet Documentation  Taken  11/22/2023 2111 by Roseline Garcia LPN  Trust Relationship/Rapport:   care explained   choices provided   Goal Outcome Evaluation:      Pain controlled, refusing turns, pr educated the importance of turns and repositioning. VSS, pt stable throughout the shift. Pt resting in bed, is able to make needs known, call light in reach, will continue current POC

## 2023-11-24 LAB
GLUCOSE BLDC GLUCOMTR-MCNC: 116 MG/DL (ref 70–99)
GLUCOSE BLDC GLUCOMTR-MCNC: 132 MG/DL (ref 70–99)
GLUCOSE BLDC GLUCOMTR-MCNC: 145 MG/DL (ref 70–99)
GLUCOSE BLDC GLUCOMTR-MCNC: 219 MG/DL (ref 70–99)

## 2023-11-24 PROCEDURE — 82948 REAGENT STRIP/BLOOD GLUCOSE: CPT

## 2023-11-24 PROCEDURE — 63710000001 INSULIN DETEMIR PER 5 UNITS: Performed by: PHYSICIAN ASSISTANT

## 2023-11-24 PROCEDURE — 97110 THERAPEUTIC EXERCISES: CPT

## 2023-11-24 PROCEDURE — 63710000001 INSULIN LISPRO (HUMAN) PER 5 UNITS: Performed by: INTERNAL MEDICINE

## 2023-11-24 RX ADMIN — INSULIN DETEMIR 60 UNITS: 100 INJECTION, SOLUTION SUBCUTANEOUS at 20:11

## 2023-11-24 RX ADMIN — APIXABAN 5 MG: 5 TABLET, FILM COATED ORAL at 09:48

## 2023-11-24 RX ADMIN — PREGABALIN 100 MG: 100 CAPSULE ORAL at 09:48

## 2023-11-24 RX ADMIN — BACLOFEN 10 MG: 10 TABLET ORAL at 00:49

## 2023-11-24 RX ADMIN — APIXABAN 5 MG: 5 TABLET, FILM COATED ORAL at 20:11

## 2023-11-24 RX ADMIN — ATORVASTATIN CALCIUM 10 MG: 10 TABLET, FILM COATED ORAL at 20:11

## 2023-11-24 RX ADMIN — Medication 250 MG: at 09:48

## 2023-11-24 RX ADMIN — INSULIN LISPRO 8 UNITS: 100 INJECTION, SOLUTION INTRAVENOUS; SUBCUTANEOUS at 20:11

## 2023-11-24 RX ADMIN — Medication 250 MG: at 20:11

## 2023-11-24 RX ADMIN — HYDROCORTISONE 1 APPLICATION: 1 OINTMENT TOPICAL at 16:10

## 2023-11-24 RX ADMIN — HYDROCORTISONE 1 APPLICATION: 1 OINTMENT TOPICAL at 20:12

## 2023-11-24 RX ADMIN — CYANOCOBALAMIN TAB 500 MCG 1000 MCG: 500 TAB at 09:48

## 2023-11-24 RX ADMIN — HYDROCORTISONE 1 APPLICATION: 1 OINTMENT TOPICAL at 09:51

## 2023-11-24 RX ADMIN — PREGABALIN 100 MG: 100 CAPSULE ORAL at 20:11

## 2023-11-24 RX ADMIN — PREGABALIN 100 MG: 100 CAPSULE ORAL at 16:10

## 2023-11-24 RX ADMIN — OXYCODONE AND ACETAMINOPHEN 1 TABLET: 7.5; 325 TABLET ORAL at 20:10

## 2023-11-24 RX ADMIN — OXYCODONE AND ACETAMINOPHEN 1 TABLET: 7.5; 325 TABLET ORAL at 12:39

## 2023-11-24 RX ADMIN — FERROUS SULFATE TAB 325 MG (65 MG ELEMENTAL FE) 325 MG: 325 (65 FE) TAB at 08:21

## 2023-11-24 RX ADMIN — LISINOPRIL 2.5 MG: 2.5 TABLET ORAL at 09:49

## 2023-11-24 RX ADMIN — OXYCODONE AND ACETAMINOPHEN 1 TABLET: 7.5; 325 TABLET ORAL at 03:33

## 2023-11-24 RX ADMIN — EMPAGLIFLOZIN 10 MG: 10 TABLET, FILM COATED ORAL at 09:48

## 2023-11-24 NOTE — PLAN OF CARE
Goal Outcome Evaluation:   Alert and oriented and pleasant with staff. X2 max assist for transfers and ambulation. X1 admin PRN pain medication, with relief of symptoms noted this shift. No significant changes noted this shift. Sitting up in bed, call light in reach.

## 2023-11-24 NOTE — THERAPY TREATMENT NOTE
SNF - Physical Therapy Treatment Note  [facility]     Patient Name: Jose Shaikh  : 1960  MRN: [id number]  Today's Date: 2023      Visit Dx:    ICD-10-CM ICD-9-CM   1. Difficulty in walking  R26.2 719.7     Patient Active Problem List   Diagnosis    Diabetic ulcer of left heel associated with type 2 DM    Acute osteomyelitis of left calcaneus     Diabetic ulcer of left heel associated with type 2 DM    Diabetic ulcer of right midfoot associated with type 2 DM    Paroxysmal atrial fibrillation    Essential hypertension    Hyperlipidemia LDL goal <100    Cellulitis and abscess of foot    High alkaline phosphatase level    Osteomyelitis    Onychomycosis    Onychocryptosis    Foot pain, bilateral    Osteomyelitis of foot, right, acute    Cellulitis of right foot    Type 2 diabetes mellitus, with long-term current use of insulin    Class 3 severe obesity due to excess calories with serious comorbidity and body mass index (BMI) of 45.0 to 49.9 in adult    Anxiety disorder, unspecified    Claustrophobia    Dependence on wheelchair    Depression, unspecified    Long term (current) use of anticoagulants    Long term (current) use of oral hypoglycemic drugs    Wound of foot    Non-prs chronic ulcer oth prt r foot limited to brkdwn skin    Orthostatic hypotension    Other chronic osteomyelitis, right ankle and foot    Personal history of nicotine dependence    Thrombocytopenia, unspecified    Unspecified open wound, right foot, initial encounter    Diabetic foot infection    Subacute osteomyelitis of right foot    Right foot pain    Sepsis    Onychomycosis    Foot pain, left    Impaired mobility and ADLs    Absence of toe of right foot    Corns and callosity    Disability of walking    Fracture    Limb swelling    Polyneuropathy    Pressure ulcer, stage 1    Shortness of breath    Generalized weakness    Debility     Past Medical History:   Diagnosis Date    Absence of toe of right foot     Acute osteomyelitis  of left calcaneus  08/18/2021    Anxiety and depression     Arthritis     Cancer     Claustrophobia     Corns and callus     Diabetic ulcer of left heel associated with type 2 DM 08/18/2021    Diabetic ulcer of left heel associated with type 2 DM 07/06/2021    Diabetic ulcer of right midfoot associated with type 2 DM 08/18/2021    Difficulty walking     Essential hypertension 08/31/2021    Hammertoe     Hyperlipidemia LDL goal <100 08/31/2021    Ingrown toenail     Obesity     Paroxysmal atrial fibrillation 08/31/2021    Polyneuropathy     Pressure ulcer, stage 1     Tinea unguium     Type 2 diabetes mellitus with polyneuropathy      Past Surgical History:   Procedure Laterality Date    CYST REMOVAL      center of back; benign    EYE SURGERY      INCISION AND DRAINAGE ABSCESS      back    INCISION AND DRAINAGE LEG Right 12/10/2021    Procedure: INCISION AND DRAINAGE LOWER EXTREMITY;  Surgeon: Ash Leyva DPM;  Location: [facility];  Service: Podiatry;  Laterality: Right;    OTHER SURGICAL HISTORY      Surgical clips left foot    TOE SURGERY Right     Removal of 5th toe    TRANS METATARSAL AMPUTATION Right 12/02/2021    Procedure: AMPUTATION TRANS METATARSAL;  Surgeon: Ash Leyva DPM;  Location: [facility];  Service: Podiatry;  Laterality: Right;    VASCULAR SURGERY      WRIST SURGERY Left     repair of injury       PT Assessment (last 12 hours)       PT Evaluation and Treatment       Row Name 11/24/23 1228          Physical Therapy Time and Intention    Subjective Information complains of;pain  -WM     Document Type therapy note (daily note)  -WM     Mode of Treatment individual therapy;physical therapy  -WM     Patient Effort good  -WM     Symptoms Noted During/After Treatment fatigue  -WM       Row Name 11/24/23 1228          Pain    Pretreatment Pain Rating 5/10  -WM     Posttreatment Pain Rating 6/10  -WM     Pain Location - Side/Orientation Left  -WM     Pain Location - hip;knee   -       Row Name 11/24/23 1228          Hip (Therapeutic Exercise)    Hip AAROM (Therapeutic Exercise) bilateral;flexion;extension;aBduction;aDduction;supine;10 repetitions;2 sets  -     Hip Isometrics (Therapeutic Exercise) bilateral;gluteal sets;supine;10 repetitions;3 second hold;2 sets  -       Row Name 11/24/23 1228          Knee (Therapeutic Exercise)    Knee Isometrics (Therapeutic Exercise) bilateral;quad sets;supine;10 repetitions;3 second hold;2 sets  -     Knee Strengthening (Therapeutic Exercise) bilateral;SAQ (short arc quad);supine;2 lb free weight;20 repititions  -       Row Name 11/24/23 1228          Ankle (Therapeutic Exercise)    Ankle AROM (Therapeutic Exercise) bilateral;dorsiflexion;plantarflexion;supine;20 repititions  -       Row Name             Wound 11/01/23 1312 Left anterior second toe Blisters    Wound - Properties Group Placement Date: 11/01/23  -RASHARD Placement Time: 1312  -RASHARD Present on Original Admission: N  -RASHARD Side: Left  -RASHARD Orientation: anterior  -RASHARD Location: second toe  -RASHARD Primary Wound Type: Blisters  -RASHARD    Retired Wound - Properties Group Placement Date: 11/01/23  -RASHARD Placement Time: 1312  -RASHARD Present on Original Admission: N  -RASHARD Side: Left  -RASHARD Orientation: anterior  -RASHARD Location: second toe  -RASHARD Primary Wound Type: Blisters  -RASHARD    Retired Wound - Properties Group Date first assessed: 11/01/23  -RASHARD Time first assessed: 1312  -RASHARD Present on Original Admission: N  -RASHARD Side: Left  -RASHARD Location: second toe  -RASHARD Primary Wound Type: Blisters  -RASHARD      Row Name             Wound 11/07/23 1240 Right anterior foot    Wound - Properties Group Placement Date: 11/07/23  -FH Placement Time: 1240  -FH Side: Right  -FH Orientation: anterior  -FH Location: foot  -FH    Retired Wound - Properties Group Placement Date: 11/07/23  -FH Placement Time: 1240  -FH Side: Right  -FH Orientation: anterior  -FH Location: foot  -FH    Retired Wound - Properties Group Date first assessed:  11/07/23  - Time first assessed: 1240  -FH Side: Right  -FH Location: foot  -FH      Row Name             Wound 11/07/23 1238 Left lower arm    Wound - Properties Group Placement Date: 11/07/23  - Placement Time: 1238  -FH Side: Left  -FH Orientation: lower  -FH Location: arm  -FH    Retired Wound - Properties Group Placement Date: 11/07/23  - Placement Time: 1238  -FH Side: Left  -FH Orientation: lower  -FH Location: arm  -FH    Retired Wound - Properties Group Date first assessed: 11/07/23  - Time first assessed: 1238  -FH Side: Left  -FH Location: arm  -FH      Row Name 11/24/23 1228          Positioning and Restraints    Pre-Treatment Position in bed  -WM     Post Treatment Position bed  -WM     In Bed fowlers;call light within reach;encouraged to call for assist  -WM       Row Name 11/24/23 1228          Progress Summary (PT)    Progress Toward Functional Goals (PT) progress toward functional goals is fair  -WM               User Key  (r) = Recorded By, (t) = Taken By, (c) = Cosigned By      Initials Name Provider Type    Karon Jordan, RN Registered Nurse    Carlos Anguiano RN Registered Nurse    Carson Johnson PTA Physical Therapist Assistant                  Section G              Section GG                       Physical Therapy Education       Title: PT OT SLP Therapies (Done)       Topic: Physical Therapy (Done)       Point: Mobility training (Done)       Learning Progress Summary             Patient Acceptance, E,TB, VU by MOE at 11/8/2023 1341                         Point: Precautions (Done)       Learning Progress Summary             Patient Acceptance, E,TB, VU by MOE at 11/8/2023 1341                                         User Key       Initials Effective Dates Name Provider Type Discipline    MOE 06/03/21 -  Merlin Rao, PT Physical Therapist PT                  PT Recommendation and Plan     Progress Summary (PT)  Progress Toward Functional Goals (PT): progress  toward functional goals is fair  Daily Progress Summary (PT): Pt has increased pain in LLE and was unable to tolerate LLE exercises or transfers   Outcome Measures       Row Name 11/24/23 1233 11/22/23 0832          How much help from another person do you currently need...    Turning from your back to your side while in flat bed without using bedrails? 2  -WM 2  -WM     Moving from lying on back to sitting on the side of a flat bed without bedrails? 2  -WM 2  -WM     Moving to and from a bed to a chair (including a wheelchair)? 1  -WM 1  -WM     Standing up from a chair using your arms (e.g., wheelchair, bedside chair)? 1  -WM 1  -WM     Climbing 3-5 steps with a railing? 1  -WM 1  -WM     To walk in hospital room? 1  -WM 1  -WM     AM-PAC 6 Clicks Score (PT) 8  -WM 8  -WM     Highest Level of Mobility Goal 3 --> Sit at edge of bed  -WM 3 --> Sit at edge of bed  -WM               User Key  (r) = Recorded By, (t) = Taken By, (c) = Cosigned By      Initials Name Provider Type    Carson oJhnson PTA Physical Therapist Assistant                     Time Calculation:    PT Charges       Row Name 11/24/23 1228             Time Calculation    PT Received On 11/24/23  -WM         Timed Charges    71294 - PT Therapeutic Exercise Minutes 15  -WM         SNF Physical Therapy Minutes    Skilled Minutes- PT 15 min  -WM         Total Minutes    Timed Charges Total Minutes 15  -WM       Total Minutes 15  -WM                User Key  (r) = Recorded By, (t) = Taken By, (c) = Cosigned By      Initials Name Provider Type    Carson Johnson PTA Physical Therapist Assistant                      PT G-Codes  Outcome Measure Options: AM-PAC 6 Clicks Daily Activity (OT), Optimal Instrument  AM-PAC 6 Clicks Score (PT): 8  AM-PAC 6 Clicks Score (OT): 14    Carson Inman PTA  11/24/2023

## 2023-11-24 NOTE — PLAN OF CARE
Problem: Adult Inpatient Plan of Care  Goal: Absence of Hospital-Acquired Illness or Injury  Intervention: Identify and Manage Fall Risk  Recent Flowsheet Documentation  Taken 11/24/2023 0309 by Roseline Garcia LPN  Safety Promotion/Fall Prevention: safety round/check completed  Taken 11/24/2023 0105 by Roseline Garcia LPN  Safety Promotion/Fall Prevention: safety round/check completed  Taken 11/23/2023 2300 by Roseline Garcia LPN  Safety Promotion/Fall Prevention: safety round/check completed  Taken 11/23/2023 2113 by Roseline Garcia LPN  Safety Promotion/Fall Prevention: safety round/check completed  Taken 11/23/2023 1913 by Roseline Garcia LPN  Safety Promotion/Fall Prevention: safety round/check completed  Intervention: Prevent Skin Injury  Recent Flowsheet Documentation  Taken 11/24/2023 0105 by Roseline Garcia LPN  Body Position: patient/family refused  Taken 11/23/2023 2113 by Roseline Garcia LPN  Body Position: position changed independently  Skin Protection: skin sealant/moisture barrier applied  Intervention: Prevent and Manage VTE (Venous Thromboembolism) Risk  Recent Flowsheet Documentation  Taken 11/23/2023 2113 by Roseline Garcia LPN  Activity Management: activity encouraged  VTE Prevention/Management: (see mar) other (see comments)  Range of Motion: active ROM (range of motion) encouraged  Intervention: Prevent Infection  Recent Flowsheet Documentation  Taken 11/23/2023 2113 by Roseline Garcia LPN  Infection Prevention:   rest/sleep promoted   hand hygiene promoted  Goal: Optimal Comfort and Wellbeing  Intervention: Monitor Pain and Promote Comfort  Recent Flowsheet Documentation  Taken 11/23/2023 2113 by Roseline Garcia LPN  Pain Management Interventions: see MAR  Intervention: Provide Person-Centered Care  Recent Flowsheet Documentation  Taken 11/23/2023 2113 by Roseline Garcia LPN  Trust Relationship/Rapport:   care explained   choices provided   Goal Outcome Evaluation:      Pt A&O,  pain controlled, educated about diabetic diet, VSS, pt stable throughout the shift. Pt resting in bed, is able to make needs known, call light in reach, will continue current POC

## 2023-11-25 LAB
GLUCOSE BLDC GLUCOMTR-MCNC: 132 MG/DL (ref 70–99)
GLUCOSE BLDC GLUCOMTR-MCNC: 148 MG/DL (ref 70–99)
GLUCOSE BLDC GLUCOMTR-MCNC: 162 MG/DL (ref 70–99)
GLUCOSE BLDC GLUCOMTR-MCNC: 205 MG/DL (ref 70–99)

## 2023-11-25 PROCEDURE — 63710000001 INSULIN LISPRO (HUMAN) PER 5 UNITS: Performed by: INTERNAL MEDICINE

## 2023-11-25 PROCEDURE — 97110 THERAPEUTIC EXERCISES: CPT

## 2023-11-25 PROCEDURE — 63710000001 INSULIN DETEMIR PER 5 UNITS: Performed by: PHYSICIAN ASSISTANT

## 2023-11-25 PROCEDURE — 82948 REAGENT STRIP/BLOOD GLUCOSE: CPT

## 2023-11-25 RX ADMIN — Medication 250 MG: at 08:48

## 2023-11-25 RX ADMIN — OXYCODONE AND ACETAMINOPHEN 1 TABLET: 7.5; 325 TABLET ORAL at 20:01

## 2023-11-25 RX ADMIN — HYDROCORTISONE 1 APPLICATION: 1 OINTMENT TOPICAL at 20:01

## 2023-11-25 RX ADMIN — CYANOCOBALAMIN TAB 500 MCG 1000 MCG: 500 TAB at 08:48

## 2023-11-25 RX ADMIN — APIXABAN 5 MG: 5 TABLET, FILM COATED ORAL at 08:48

## 2023-11-25 RX ADMIN — FERROUS SULFATE TAB 325 MG (65 MG ELEMENTAL FE) 325 MG: 325 (65 FE) TAB at 08:48

## 2023-11-25 RX ADMIN — BACLOFEN 10 MG: 10 TABLET ORAL at 08:48

## 2023-11-25 RX ADMIN — INSULIN LISPRO 4 UNITS: 100 INJECTION, SOLUTION INTRAVENOUS; SUBCUTANEOUS at 17:41

## 2023-11-25 RX ADMIN — Medication 250 MG: at 20:01

## 2023-11-25 RX ADMIN — HYDROCORTISONE 1 APPLICATION: 1 OINTMENT TOPICAL at 16:33

## 2023-11-25 RX ADMIN — INSULIN LISPRO 8 UNITS: 100 INJECTION, SOLUTION INTRAVENOUS; SUBCUTANEOUS at 20:02

## 2023-11-25 RX ADMIN — EMPAGLIFLOZIN 10 MG: 10 TABLET, FILM COATED ORAL at 08:48

## 2023-11-25 RX ADMIN — APIXABAN 5 MG: 5 TABLET, FILM COATED ORAL at 20:01

## 2023-11-25 RX ADMIN — INSULIN DETEMIR 60 UNITS: 100 INJECTION, SOLUTION SUBCUTANEOUS at 20:02

## 2023-11-25 RX ADMIN — PREGABALIN 100 MG: 100 CAPSULE ORAL at 20:01

## 2023-11-25 RX ADMIN — INSULIN DETEMIR 70 UNITS: 100 INJECTION, SOLUTION SUBCUTANEOUS at 08:48

## 2023-11-25 RX ADMIN — ATORVASTATIN CALCIUM 10 MG: 10 TABLET, FILM COATED ORAL at 20:01

## 2023-11-25 RX ADMIN — PREGABALIN 100 MG: 100 CAPSULE ORAL at 08:48

## 2023-11-25 RX ADMIN — HYDROCORTISONE 1 APPLICATION: 1 OINTMENT TOPICAL at 08:50

## 2023-11-25 RX ADMIN — PREGABALIN 100 MG: 100 CAPSULE ORAL at 16:33

## 2023-11-25 RX ADMIN — OXYCODONE AND ACETAMINOPHEN 1 TABLET: 7.5; 325 TABLET ORAL at 13:55

## 2023-11-25 RX ADMIN — OXYCODONE AND ACETAMINOPHEN 1 TABLET: 7.5; 325 TABLET ORAL at 03:55

## 2023-11-25 NOTE — PLAN OF CARE
Goal Outcome Evaluation:  Plan of Care Reviewed With: patient        Progress: no change  Outcome Evaluation: Resident alert, oriented, able to make needs known to staff. Remains in bed all shift, not able to transfer or pivot. medicated for prn pain x1, medicated for prn muscle spasms x1, effective. resident doordashed fast food this afternoon causing blood sugar to be elevated at dinner, covered with sliding scale. stable for BF and lunch. Dressing to rt foot completed as orderd. Resident pleasant and cooperative.

## 2023-11-25 NOTE — PLAN OF CARE
Goal Outcome Evaluation:  Plan of Care Reviewed With: patient        Progress: no change  Outcome Evaluation: Vital signs stable. Alert and oriented. Percocet administered X 2 for hip pain. Repositions self using trapeze. Uses bedpan for bowel movement and urinates in urinal. Watched television and napped during night. Calm and cooperative.

## 2023-11-26 LAB
GLUCOSE BLDC GLUCOMTR-MCNC: 102 MG/DL (ref 70–99)
GLUCOSE BLDC GLUCOMTR-MCNC: 134 MG/DL (ref 70–99)
GLUCOSE BLDC GLUCOMTR-MCNC: 162 MG/DL (ref 70–99)
GLUCOSE BLDC GLUCOMTR-MCNC: 204 MG/DL (ref 70–99)

## 2023-11-26 PROCEDURE — 82948 REAGENT STRIP/BLOOD GLUCOSE: CPT

## 2023-11-26 PROCEDURE — 63710000001 INSULIN DETEMIR PER 5 UNITS: Performed by: PHYSICIAN ASSISTANT

## 2023-11-26 PROCEDURE — 63710000001 INSULIN LISPRO (HUMAN) PER 5 UNITS: Performed by: INTERNAL MEDICINE

## 2023-11-26 RX ADMIN — CYANOCOBALAMIN TAB 500 MCG 1000 MCG: 500 TAB at 10:14

## 2023-11-26 RX ADMIN — PREGABALIN 100 MG: 100 CAPSULE ORAL at 20:19

## 2023-11-26 RX ADMIN — HYDROCORTISONE 1 APPLICATION: 1 OINTMENT TOPICAL at 15:36

## 2023-11-26 RX ADMIN — Medication 250 MG: at 10:14

## 2023-11-26 RX ADMIN — ATORVASTATIN CALCIUM 10 MG: 10 TABLET, FILM COATED ORAL at 20:19

## 2023-11-26 RX ADMIN — OXYCODONE AND ACETAMINOPHEN 1 TABLET: 7.5; 325 TABLET ORAL at 02:00

## 2023-11-26 RX ADMIN — INSULIN DETEMIR 70 UNITS: 100 INJECTION, SOLUTION SUBCUTANEOUS at 10:13

## 2023-11-26 RX ADMIN — PREGABALIN 100 MG: 100 CAPSULE ORAL at 10:15

## 2023-11-26 RX ADMIN — BACLOFEN 10 MG: 10 TABLET ORAL at 11:50

## 2023-11-26 RX ADMIN — PREGABALIN 100 MG: 100 CAPSULE ORAL at 15:36

## 2023-11-26 RX ADMIN — OXYCODONE AND ACETAMINOPHEN 1 TABLET: 7.5; 325 TABLET ORAL at 10:14

## 2023-11-26 RX ADMIN — HYDROCORTISONE 1 APPLICATION: 1 OINTMENT TOPICAL at 20:20

## 2023-11-26 RX ADMIN — APIXABAN 5 MG: 5 TABLET, FILM COATED ORAL at 20:19

## 2023-11-26 RX ADMIN — EMPAGLIFLOZIN 10 MG: 10 TABLET, FILM COATED ORAL at 10:14

## 2023-11-26 RX ADMIN — HYDROCORTISONE 1 APPLICATION: 1 OINTMENT TOPICAL at 10:15

## 2023-11-26 RX ADMIN — OXYCODONE AND ACETAMINOPHEN 1 TABLET: 7.5; 325 TABLET ORAL at 20:20

## 2023-11-26 RX ADMIN — LISINOPRIL 2.5 MG: 2.5 TABLET ORAL at 10:15

## 2023-11-26 RX ADMIN — INSULIN DETEMIR 60 UNITS: 100 INJECTION, SOLUTION SUBCUTANEOUS at 20:20

## 2023-11-26 RX ADMIN — INSULIN LISPRO 8 UNITS: 100 INJECTION, SOLUTION INTRAVENOUS; SUBCUTANEOUS at 20:20

## 2023-11-26 RX ADMIN — APIXABAN 5 MG: 5 TABLET, FILM COATED ORAL at 10:15

## 2023-11-26 RX ADMIN — INSULIN LISPRO 4 UNITS: 100 INJECTION, SOLUTION INTRAVENOUS; SUBCUTANEOUS at 07:57

## 2023-11-26 RX ADMIN — Medication 250 MG: at 20:20

## 2023-11-26 RX ADMIN — FERROUS SULFATE TAB 325 MG (65 MG ELEMENTAL FE) 325 MG: 325 (65 FE) TAB at 07:57

## 2023-11-26 RX ADMIN — LIDOCAINE 1 PATCH: 4 PATCH TOPICAL at 10:14

## 2023-11-26 NOTE — PLAN OF CARE
Goal Outcome Evaluation:  Plan of Care Reviewed With: patient        Progress: no change  Outcome Evaluation: Alert and oriented X 4. Vital signs stable. Remains in bed. Refuses to turn. Uses Trapeze bar for assistance in repositioning. Percocet administered for left hip pain X 2. Rested well during night.

## 2023-11-26 NOTE — PLAN OF CARE
Goal Outcome Evaluation:              Outcome Evaluation: Plan of care continues at this time. Patient is alert and oriented x 4, patient is able to make needs known and uses call light. Patient uses trapeze bar to assist with pulling self up in bed. See eMAR for mediation administration details. Blood sugar monitored. Patient uses bedpan and urinal. Call light in reach.

## 2023-11-27 LAB
GLUCOSE BLDC GLUCOMTR-MCNC: 106 MG/DL (ref 70–99)
GLUCOSE BLDC GLUCOMTR-MCNC: 157 MG/DL (ref 70–99)
GLUCOSE BLDC GLUCOMTR-MCNC: 198 MG/DL (ref 70–99)
GLUCOSE BLDC GLUCOMTR-MCNC: 217 MG/DL (ref 70–99)

## 2023-11-27 PROCEDURE — 97530 THERAPEUTIC ACTIVITIES: CPT

## 2023-11-27 PROCEDURE — 63710000001 INSULIN DETEMIR PER 5 UNITS: Performed by: PHYSICIAN ASSISTANT

## 2023-11-27 PROCEDURE — 63710000001 INSULIN LISPRO (HUMAN) PER 5 UNITS: Performed by: INTERNAL MEDICINE

## 2023-11-27 PROCEDURE — 97110 THERAPEUTIC EXERCISES: CPT

## 2023-11-27 PROCEDURE — 82948 REAGENT STRIP/BLOOD GLUCOSE: CPT

## 2023-11-27 RX ADMIN — ATORVASTATIN CALCIUM 10 MG: 10 TABLET, FILM COATED ORAL at 20:35

## 2023-11-27 RX ADMIN — HYDROCORTISONE 1 APPLICATION: 1 OINTMENT TOPICAL at 16:03

## 2023-11-27 RX ADMIN — APIXABAN 5 MG: 5 TABLET, FILM COATED ORAL at 20:35

## 2023-11-27 RX ADMIN — FERROUS SULFATE TAB 325 MG (65 MG ELEMENTAL FE) 325 MG: 325 (65 FE) TAB at 08:46

## 2023-11-27 RX ADMIN — INSULIN LISPRO 4 UNITS: 100 INJECTION, SOLUTION INTRAVENOUS; SUBCUTANEOUS at 20:37

## 2023-11-27 RX ADMIN — INSULIN DETEMIR 60 UNITS: 100 INJECTION, SOLUTION SUBCUTANEOUS at 20:37

## 2023-11-27 RX ADMIN — PREGABALIN 100 MG: 100 CAPSULE ORAL at 16:01

## 2023-11-27 RX ADMIN — INSULIN LISPRO 8 UNITS: 100 INJECTION, SOLUTION INTRAVENOUS; SUBCUTANEOUS at 17:40

## 2023-11-27 RX ADMIN — PREGABALIN 100 MG: 100 CAPSULE ORAL at 08:46

## 2023-11-27 RX ADMIN — OXYCODONE AND ACETAMINOPHEN 1 TABLET: 7.5; 325 TABLET ORAL at 11:12

## 2023-11-27 RX ADMIN — OXYCODONE AND ACETAMINOPHEN 1 TABLET: 7.5; 325 TABLET ORAL at 04:59

## 2023-11-27 RX ADMIN — APIXABAN 5 MG: 5 TABLET, FILM COATED ORAL at 08:46

## 2023-11-27 RX ADMIN — Medication 250 MG: at 08:46

## 2023-11-27 RX ADMIN — HYDROCORTISONE 1 APPLICATION: 1 OINTMENT TOPICAL at 20:36

## 2023-11-27 RX ADMIN — EMPAGLIFLOZIN 10 MG: 10 TABLET, FILM COATED ORAL at 08:46

## 2023-11-27 RX ADMIN — Medication 250 MG: at 20:35

## 2023-11-27 RX ADMIN — INSULIN LISPRO 4 UNITS: 100 INJECTION, SOLUTION INTRAVENOUS; SUBCUTANEOUS at 12:06

## 2023-11-27 RX ADMIN — LISINOPRIL 2.5 MG: 2.5 TABLET ORAL at 08:46

## 2023-11-27 RX ADMIN — CYANOCOBALAMIN TAB 500 MCG 1000 MCG: 500 TAB at 08:46

## 2023-11-27 RX ADMIN — INSULIN DETEMIR 70 UNITS: 100 INJECTION, SOLUTION SUBCUTANEOUS at 08:46

## 2023-11-27 RX ADMIN — PREGABALIN 100 MG: 100 CAPSULE ORAL at 20:35

## 2023-11-27 RX ADMIN — HYDROCORTISONE 1 APPLICATION: 1 OINTMENT TOPICAL at 08:47

## 2023-11-27 RX ADMIN — OXYCODONE AND ACETAMINOPHEN 1 TABLET: 7.5; 325 TABLET ORAL at 20:35

## 2023-11-27 NOTE — PLAN OF CARE
Goal Outcome Evaluation:  Plan of Care Reviewed With: patient        Progress: no change  Outcome Evaluation: Alert and oriented. Vital signs stable. No significant change. Percocet administered at HS. Continue plan of care.

## 2023-11-27 NOTE — PLAN OF CARE
Goal Outcome Evaluation:  Plan of Care Reviewed With: patient        Progress: no change  Outcome Evaluation: Resident alert, oriented, able to make needs known to staff. Stays in bed, but up with therapy today, standing at bedside. incontinent of BM x1 one this shift. Bloodglucose stable at breakfast, elevated for lunch and dinner, covered with sliding scale. medicated for prn pain x1, effective. states will attempt to walk with therapy tomorrow. Resident pleasant and cooperative.

## 2023-11-27 NOTE — THERAPY RE-EVALUATION
SNF - Occupational Therapy Re-Evaluation   Angel[facility]    Patient Name: Jose Shaikh  : 1960    MRN: [id number]                              Today's Date: 2023       Admit Date: 2023    Visit Dx:     ICD-10-CM ICD-9-CM   1. Difficulty in walking  R26.2 719.7     Patient Active Problem List   Diagnosis    Diabetic ulcer of left heel associated with type 2 DM    Acute osteomyelitis of left calcaneus     Diabetic ulcer of left heel associated with type 2 DM    Diabetic ulcer of right midfoot associated with type 2 DM    Paroxysmal atrial fibrillation    Essential hypertension    Hyperlipidemia LDL goal <100    Cellulitis and abscess of foot    High alkaline phosphatase level    Osteomyelitis    Onychomycosis    Onychocryptosis    Foot pain, bilateral    Osteomyelitis of foot, right, acute    Cellulitis of right foot    Type 2 diabetes mellitus, with long-term current use of insulin    Class 3 severe obesity due to excess calories with serious comorbidity and body mass index (BMI) of 45.0 to 49.9 in adult    Anxiety disorder, unspecified    Claustrophobia    Dependence on wheelchair    Depression, unspecified    Long term (current) use of anticoagulants    Long term (current) use of oral hypoglycemic drugs    Wound of foot    Non-prs chronic ulcer oth prt r foot limited to brkdwn skin    Orthostatic hypotension    Other chronic osteomyelitis, right ankle and foot    Personal history of nicotine dependence    Thrombocytopenia, unspecified    Unspecified open wound, right foot, initial encounter    Diabetic foot infection    Subacute osteomyelitis of right foot    Right foot pain    Sepsis    Onychomycosis    Foot pain, left    Impaired mobility and ADLs    Absence of toe of right foot    Corns and callosity    Disability of walking    Fracture    Limb swelling    Polyneuropathy    Pressure ulcer, stage 1    Shortness of breath    Generalized weakness    Debility     Past Medical History:   Diagnosis  Date    Absence of toe of right foot     Acute osteomyelitis of left calcaneus  08/18/2021    Anxiety and depression     Arthritis     Cancer     Claustrophobia     Corns and callus     Diabetic ulcer of left heel associated with type 2 DM 08/18/2021    Diabetic ulcer of left heel associated with type 2 DM 07/06/2021    Diabetic ulcer of right midfoot associated with type 2 DM 08/18/2021    Difficulty walking     Essential hypertension 08/31/2021    Hammertoe     Hyperlipidemia LDL goal <100 08/31/2021    Ingrown toenail     Obesity     Paroxysmal atrial fibrillation 08/31/2021    Polyneuropathy     Pressure ulcer, stage 1     Tinea unguium     Type 2 diabetes mellitus with polyneuropathy      Past Surgical History:   Procedure Laterality Date    CYST REMOVAL      center of back; benign    EYE SURGERY      INCISION AND DRAINAGE ABSCESS      back    INCISION AND DRAINAGE LEG Right 12/10/2021    Procedure: INCISION AND DRAINAGE LOWER EXTREMITY;  Surgeon: Ash Leyva DPM;  Location: [facility];  Service: Podiatry;  Laterality: Right;    OTHER SURGICAL HISTORY      Surgical clips left foot    TOE SURGERY Right     Removal of 5th toe    TRANS METATARSAL AMPUTATION Right 12/02/2021    Procedure: AMPUTATION TRANS METATARSAL;  Surgeon: Ash Leyva DPM;  Location: [facility] OR;  Service: Podiatry;  Laterality: Right;    VASCULAR SURGERY      WRIST SURGERY Left     repair of injury      General Information       Row Name 11/27/23 1253          OT Time and Intention    Document Type re-evaluation  -PG     Mode of Treatment individual therapy;occupational therapy  -PG               User Key  (r) = Recorded By, (t) = Taken By, (c) = Cosigned By      Initials Name Provider Type    PG Kodak Matos OT Occupational Therapist                     Mobility/ADL's       Row Name 11/27/23 0313          Sit-Stand Transfer    Sit-Stand [place] (Transfers) maximum assist (25% patient effort);verbal cues;2  person assist  -PG     Assistive Device (Sit-Stand Transfers) bariatric;walker, front-wheeled  -PG       Row Name 11/27/23 1250          Stand-Sit Transfer    Stand-Sit [place] (Transfers) maximum assist (25% patient effort);verbal cues;2 person assist  -PG     Assistive Device (Stand-Sit Transfers) bariatric;walker, front-wheeled  -PG       Row Name 11/27/23 1250          Bathing Assessment/Intervention    [place] Level (Bathing) bathing skills;upper body;set up  -PG               User Key  (r) = Recorded By, (t) = Taken By, (c) = Cosigned By      Initials Name Provider Type    Kodak Velazco OT Occupational Therapist                   Obj/Interventions       Row Name 11/27/23 1252          Shoulder (Therapeutic Exercise)    Shoulder (Therapeutic Exercise) strengthening exercise  -PG     Shoulder Strengthening (Therapeutic Exercise) 5 lb free weight;20 repititions;2 sets  -PG       Row Name 11/27/23 1252          Elbow/Forearm (Therapeutic Exercise)    Elbow/Forearm (Therapeutic Exercise) strengthening exercise  -PG     Elbow/Forearm Strengthening (Therapeutic Exercise) 2 sets;20 repititions;5 lb free weight  -PG       Row Name 11/27/23 1252          Motor Skills    Therapeutic Exercise shoulder;elbow/forearm  -PG               User Key  (r) = Recorded By, (t) = Taken By, (c) = Cosigned By      Initials Name Provider Type    PG Kodak Matos OT Occupational Therapist                   Goals/Plan       Row Name 11/27/23 1252          Transfer Goal 1 (OT)    Activity/Assistive Device (Transfer Goal 1, OT) commode, 3-in-1  -PG     [place] Level/Cues Needed (Transfer Goal 1, OT) minimum assist (75% or more patient effort)  -PG     Time Frame (Transfer Goal 1, OT) long term goal (LTG);30 days  -PG     Progress/Outcome (Transfer Goal 1, OT) new goal  -PG       Row Name 11/27/23 1255          Bathing Goal 1 (OT)    Progress/Outcomes (Bathing Goal 1, OT) continuing progress toward goal  -PG       Row  Name 11/27/23 1255          Dressing Goal 1 (OT)    Progress/Outcome (Dressing Goal 1, OT) continuing progress toward goal  -PG       Row Name 11/27/23 1255          Toileting Goal 1 (OT)    Progress/Outcome (Toileting Goal 1, OT) continuing progress toward goal  -PG       Row Name 11/27/23 1255          Grooming Goal 1 (OT)    Progress/Outcome (Grooming Goal 1, OT) continuing progress toward goal  -PG       Row Name 11/27/23 1255          Strength Goal 1 (OT)    Progress/Outcome (Strength Goal 1, OT) continuing progress toward goal  -PG       Row Name 11/27/23 1255          Problem Specific Goal 1 (OT)    Progress/Outcome (Problem Specific Goal 1, OT) continuing progress toward goal  -PG               User Key  (r) = Recorded By, (t) = Taken By, (c) = Cosigned By      Initials Name Provider Type    PG Kodak Matos, OT Occupational Therapist                   Clinical Impression       Row Name 11/27/23 1252          Plan of Care Review    Progress no change  -PG     Outcome Evaluation No significant change noted.  Patient continues to participate in upper body strengthening exercises and sit to stand transfers with moderate to maximal assist x 2 required.  Patient stood for approximately 1 minute today.  Patient then attempted to walk with the bed being pushed behind him.  Patient however unable to stand at this point.  Continue OT services per plan of care  -PG       Row Name 11/27/23 1252          Therapy Plan Review/Discharge Plan (OT)    Anticipated Discharge Disposition (OT) sub acute care setting;extended care facility  -PG               User Key  (r) = Recorded By, (t) = Taken By, (c) = Cosigned By      Initials Name Provider Type    PG Kodak Matos OT Occupational Therapist                   Outcome Measures       Row Name 11/27/23 1257          How much help from another is currently needed...    Putting on and taking off regular lower body clothing? 1  -PG     Bathing (including washing, rinsing, and  drying) 2  -PG     Toileting (which includes using toilet bed pan or urinal) 1  -PG     Putting on and taking off regular upper body clothing 3  -PG     Taking care of personal grooming (such as brushing teeth) 3  -PG     Eating meals 4  -PG     AM-PAC 6 Clicks Score (OT) 14  -PG       Row Name 11/27/23 1210 11/27/23 0400       How much help from another person do you currently need...    Turning from your back to your side while in flat bed without using bedrails? 2  -WM 2  -CC    Moving from lying on back to sitting on the side of a flat bed without bedrails? 2  -WM 2  -CC    Moving to and from a bed to a chair (including a wheelchair)? 1  -WM 1  -CC    Standing up from a chair using your arms (e.g., wheelchair, bedside chair)? 1  -WM 1  -CC    Climbing 3-5 steps with a railing? 1  -WM 1  -CC    To walk in hospital room? 1  -WM 1  -CC    AM-PAC 6 Clicks Score (PT) 8  -WM 8  -CC    Highest Level of Mobility Goal 3 --> Sit at edge of bed  -WM 3 --> Sit at edge of bed  -CC      Row Name 11/27/23 1257          Functional Assessment    Outcome Measure Options Optimal Instrument;AM-PAC 6 Clicks Daily Activity (OT)  -PG       Row Name 11/27/23 1257          Optimal Instrument    Optimal Instrument Optimal - 3  -PG     Bending/Stooping 5  -PG     Standing 4  -PG     Reaching 2  -PG               User Key  (r) = Recorded By, (t) = Taken By, (c) = Cosigned By      Initials Name Provider Type    CC Amanda Hickman RN Registered Nurse    Carson Johnson PTA Physical Therapist Assistant    Kodak Velazco, BEKAH Occupational Therapist                  Section G              Section GG  SectionGG: Functional Ability/Goals, Adm  Self Care, Prior Functioning ([id number]): 1. Dependent  Upper Extremity Range of Motion (IC5859W): No impairment  Self Care, Admission (Section GG)  Eating: Self-Care Admission Performance ([id number]): independent (06)  Oral Hygiene: Self-Care Admission Performance ([id number]): independent  (06)  Toileting Hygiene: Self-Care Admission Performance (LC5128E7): dependent (01)  Shower/Bathe Self: Self-Care Admission Performance (HL9600C7): substantial/maximal assistance (02)  Upper Body Dressing: Self-Care Admission Performance (LF4546H0): partial/moderate assistance (03)  Lower Body Dressing: Self-Care Admission Performance (HP9746P0): dependent (01)  Putting On/Taking Off Footwear: Self-Care Admission Performance (VI8856F9): dependent (01)  Personal Hygiene: Self-Care Admission Performance (GA4714O1): dependent (01)  Mobility, Admission Performance (XW0109)  Toilet Transfer: Mobility Admission Performance (WL4415W4): not attempted, medical condition/safety concern (88)  Section GG: Functional Ability/Goals, DC  Eating: Self-Care Discharge Goal (VJ3261E1): independent (06)  Oral Hygiene: Self-Care Discharge Goal (CV8785R0): independent (06)  Shower/Bathe Self: Self-Care Discharge Goal (BU3544Q6): supervision or touching assistance (04)  Upper Body Dressing: Self-Care Discharge Goal (MR8710N6): setup or clean-up assistance (05)  Lower Body Dressing: Self-Care Discharge Goal (VL1098Q4): supervision or touching assistance (04)  Putting On/Taking Off Footwear: Self-Care Discharge Goal (DY0414B2): supervision or touching assistance (04)  Personal Hygiene: Self-Care Discharge Goal (MD3562F1): supervision or touching assistance (04)  Mobility (GG), Discharge Goal (KK8900)  Toilet Transfer: Mobility Discharge Goal (AX1676W4): supervision or touching assistance (04)          Occupational Therapy Education       Title: PT OT SLP Therapies (Done)       Topic: Occupational Therapy (Done)       Point: ADL training (Done)       Description:   Instruct learner(s) on proper safety adaptation and remediation techniques during self care or transfers.   Instruct in proper use of assistive devices.                  Learning Progress Summary             Patient Acceptance, E,D, DU by PG at 11/7/2023 0904                          Point: Home exercise program (Done)       Description:   Instruct learner(s) on appropriate technique for monitoring, assisting and/or progressing therapeutic exercises/activities.                  Learning Progress Summary             Patient Acceptance, E,D, DU by PG at 11/7/2023 0932                         Point: Precautions (Done)       Description:   Instruct learner(s) on prescribed precautions during self-care and functional transfers.                  Learning Progress Summary             Patient Acceptance, E,D, DU by PG at 11/7/2023 0932                         Point: Body mechanics (Done)       Description:   Instruct learner(s) on proper positioning and spine alignment during self-care, functional mobility activities and/or exercises.                  Learning Progress Summary             Patient Acceptance, E,D, DU by PG at 11/7/2023 0932                                         User Key       Initials Effective Dates Name Provider Type Discipline    PG 06/16/21 -  Kodak Matos OT Occupational Therapist OT                  OT Recommendation and Plan  Planned Therapy Interventions (OT): activity tolerance training, BADL retraining, strengthening exercise, transfer/mobility retraining, patient/caregiver education/training, occupation/activity based interventions  Therapy Frequency (OT): 5 times/wk  Plan of Care Review  Plan of Care Reviewed With: patient  Progress: no change  Outcome Evaluation: No significant change noted.  Patient continues to participate in upper body strengthening exercises and sit to stand transfers with moderate to maximal assist x 2 required.  Patient stood for approximately 1 minute today.  Patient then attempted to walk with the bed being pushed behind him.  Patient however unable to stand at this point.  Continue OT services per plan of care     Time Calculation:   Evaluation Complexity (OT)  Review Occupational Profile/Medical/Therapy History Complexity: brief/low  complexity  Assessment, Occupational Performance/Identification of Deficit Complexity: 3-5 performance deficits  Clinical Decision Making Complexity (OT): problem focused assessment/low complexity  Overall Complexity of Evaluation (OT): low complexity     Time Calculation- OT       Row Name 11/27/23 1259 11/27/23 1258          Time Calculation- OT    OT Received On -- 11/27/23  -PG     OT Goal Re-Cert Due Date -- 12/27/23  -PG        Timed Charges    35351 - OT Therapeutic Exercise Minutes -- 20  -PG     60995 - OT Therapeutic Activity Minutes -- 20  -PG     35292 - OT Self Care/Mgmt Minutes -- 5  -PG        [facility] Occupational Therapy Minutes    Skilled Minutes- OT 45 min  -PG --        Total Minutes    Timed Charges Total Minutes -- 45  -PG      Total Minutes -- 45  -PG               User Key  (r) = Recorded By, (t) = Taken By, (c) = Cosigned By      Initials Name Provider Type    PG Kodak Matos OT Occupational Therapist                  Therapy Charges for Today       Code Description Service Date Service Provider Modifiers Qty    [id number]  OT THER PROC EA 15 MIN 11/27/2023 Kodak Matos OT GO 2    [id number]  OT THERAPEUTIC ACT EA 15 MIN 11/27/2023 Kodak Matos OT GO 1                 Kodak Matos OT  11/27/2023

## 2023-11-27 NOTE — THERAPY TREATMENT NOTE
SNF - Physical Therapy Treatment Note  KEO Dominguez     Patient Name: Jose Shaikh  : 1960  MRN: [id number]  Today's Date: 2023      Visit Dx:    ICD-10-CM ICD-9-CM   1. Difficulty in walking  R26.2 719.7     Patient Active Problem List   Diagnosis    Diabetic ulcer of left heel associated with type 2 DM    Acute osteomyelitis of left calcaneus     Diabetic ulcer of left heel associated with type 2 DM    Diabetic ulcer of right midfoot associated with type 2 DM    Paroxysmal atrial fibrillation    Essential hypertension    Hyperlipidemia LDL goal <100    Cellulitis and abscess of foot    High alkaline phosphatase level    Osteomyelitis    Onychomycosis    Onychocryptosis    Foot pain, bilateral    Osteomyelitis of foot, right, acute    Cellulitis of right foot    Type 2 diabetes mellitus, with long-term current use of insulin    Class 3 severe obesity due to excess calories with serious comorbidity and body mass index (BMI) of 45.0 to 49.9 in adult    Anxiety disorder, unspecified    Claustrophobia    Dependence on wheelchair    Depression, unspecified    Long term (current) use of anticoagulants    Long term (current) use of oral hypoglycemic drugs    Wound of foot    Non-prs chronic ulcer oth prt r foot limited to brkdwn skin    Orthostatic hypotension    Other chronic osteomyelitis, right ankle and foot    Personal history of nicotine dependence    Thrombocytopenia, unspecified    Unspecified open wound, right foot, initial encounter    Diabetic foot infection    Subacute osteomyelitis of right foot    Right foot pain    Sepsis    Onychomycosis    Foot pain, left    Impaired mobility and ADLs    Absence of toe of right foot    Corns and callosity    Disability of walking    Fracture    Limb swelling    Polyneuropathy    Pressure ulcer, stage 1    Shortness of breath    Generalized weakness    Debility     Past Medical History:   Diagnosis Date    Absence of toe of right foot     Acute osteomyelitis  of left calcaneus  08/18/2021    Anxiety and depression     Arthritis     Cancer     Claustrophobia     Corns and callus     Diabetic ulcer of left heel associated with type 2 DM 08/18/2021    Diabetic ulcer of left heel associated with type 2 DM 07/06/2021    Diabetic ulcer of right midfoot associated with type 2 DM 08/18/2021    Difficulty walking     Essential hypertension 08/31/2021    Hammertoe     Hyperlipidemia LDL goal <100 08/31/2021    Ingrown toenail     Obesity     Paroxysmal atrial fibrillation 08/31/2021    Polyneuropathy     Pressure ulcer, stage 1     Tinea unguium     Type 2 diabetes mellitus with polyneuropathy      Past Surgical History:   Procedure Laterality Date    CYST REMOVAL      center of back; benign    EYE SURGERY      INCISION AND DRAINAGE ABSCESS      back    INCISION AND DRAINAGE LEG Right 12/10/2021    Procedure: INCISION AND DRAINAGE LOWER EXTREMITY;  Surgeon: Ash Leyva DPM;  [facility]  Service: Podiatry;  Laterality: Right;    OTHER SURGICAL HISTORY      Surgical clips left foot    TOE SURGERY Right     Removal of 5th toe    TRANS METATARSAL AMPUTATION Right 12/02/2021    Procedure: AMPUTATION TRANS METATARSAL;  Surgeon: Ash Leyva DPM;  Location: [facility]  Service: Podiatry;  Laterality: Right;    VASCULAR SURGERY      WRIST SURGERY Left     repair of injury       PT Assessment (last 12 hours)       PT Evaluation and Treatment       Row Name 11/27/23 1208          Physical Therapy Time and Intention    Document Type therapy note (daily note)  -WM     Mode of Treatment individual therapy;physical therapy  -WM     Patient Effort good  -WM     Symptoms Noted During/After Treatment fatigue;increased pain  -WM       Row Name 11/27/23 2371          Pain Scale: FACES Pre/Post-Treatment    Pain: FACES Scale, Pretreatment 2-->hurts little bit  -WM     Posttreatment Pain Rating 6-->hurts even more  -WM     Pain Location - Side/Orientation  Left  -WM     Pain Location generalized  -     Pain Location - hip;knee  -       Row Name 11/27/23 1204          Bed Mobility    Supine-Sit [place] (Bed Mobility) moderate assist (50% patient effort);verbal cues  -WM     Sit-Supine [place] (Bed Mobility) moderate assist (50% patient effort);verbal cues  -WM     Bed Mobility, Safety Issues decreased use of arms for pushing/pulling;decreased use of legs for bridging/pushing  -     Assistive Device (Bed Mobility) bed rails;draw sheet;head of bed elevated;leg ;overhead trapeze  -       Row Name 11/27/23 1204          Sit-Stand Transfer    Sit-Stand [place] (Transfers) maximum assist (25% patient effort);verbal cues;2 person assist  -     Assistive Device (Sit-Stand Transfers) bariatric;walker, front-wheeled  -       Row Name 11/27/23 1204          Stand-Sit Transfer    Stand-Sit [place] (Transfers) maximum assist (25% patient effort);verbal cues;2 person assist  -     Assistive Device (Stand-Sit Transfers) bariatric;walker, front-wheeled  -       Row Name 11/27/23 1204          Safety Issues, Functional Mobility    Impairments Affecting Function (Mobility) balance;endurance/activity tolerance;pain;range of motion (ROM);strength  -       Row Name 11/27/23 1204          Hip (Therapeutic Exercise)    Hip AAROM (Therapeutic Exercise) bilateral;aBduction;aDduction;supine;10 repetitions;2 sets  -     Hip Isometrics (Therapeutic Exercise) bilateral;gluteal sets;supine;10 repetitions;3 second hold;2 sets  -       Row Name 11/27/23 1204          Knee (Therapeutic Exercise)    Knee Isometrics (Therapeutic Exercise) bilateral;quad sets;supine;10 repetitions;2 sets  -     Knee Strengthening (Therapeutic Exercise) bilateral;SAQ (short arc quad);supine;1 lb free weight;2 lb free weight;10 repetitions;2 sets;SLR (straight leg raise)  2 lb RLE, 1 lb LLE  -       Row Name 11/27/23 1204          Ankle (Therapeutic Exercise)    Ankle  AROM (Therapeutic Exercise) bilateral;dorsiflexion;plantarflexion;supine;20 repititions  -WM       Row Name             Wound 11/01/23 1312 Left anterior second toe Blisters    Wound - Properties Group Placement Date: 11/01/23  -RASHARD Placement Time: 1312  -RASHARD Present on Original Admission: N  -RASHARD Side: Left  -RASHARD Orientation: anterior  -RASHARD Location: second toe  -RASHARD Primary Wound Type: Blisters  -RASHARD    Retired Wound - Properties Group Placement Date: 11/01/23  -RASHARD Placement Time: 1312  -RASHARD Present on Original Admission: N  -RASHARD Side: Left  -RASHARD Orientation: anterior  -RASHARD Location: second toe  -RASHARD Primary Wound Type: Blisters  -RASHARD    Retired Wound - Properties Group Date first assessed: 11/01/23  -RASHARD Time first assessed: 1312  -RASHARD Present on Original Admission: N  -RASHARD Side: Left  -RASHARD Location: second toe  -RASHARD Primary Wound Type: Blisters  -RASHARD      Row Name             Wound 11/07/23 1240 Right anterior foot    Wound - Properties Group Placement Date: 11/07/23  - Placement Time: 1240  -FH Side: Right  -FH Orientation: anterior  -FH Location: foot  -FH    Retired Wound - Properties Group Placement Date: 11/07/23  - Placement Time: 1240  -FH Side: Right  -FH Orientation: anterior  -FH Location: foot  -FH    Retired Wound - Properties Group Date first assessed: 11/07/23  - Time first assessed: 1240  -FH Side: Right  -FH Location: foot  -FH      Row Name             Wound 11/07/23 1238 Left lower arm    Wound - Properties Group Placement Date: 11/07/23  - Placement Time: 1238  -FH Side: Left  -FH Orientation: lower  -FH Location: arm  -FH    Retired Wound - Properties Group Placement Date: 11/07/23  - Placement Time: 1238  -FH Side: Left  -FH Orientation: lower  -FH Location: arm  -FH    Retired Wound - Properties Group Date first assessed: 11/07/23  - Time first assessed: 1238  -FH Side: Left  -FH Location: arm  -FH      Row Name 11/27/23 1204          Positioning and Restraints    Pre-Treatment Position in bed   -WM     Post Treatment Position bed  -WM     In Bed fowlers;call light within reach;encouraged to call for assist;exit alarm on  -WM       Row Name 11/27/23 1204          Progress Summary (PT)    Progress Toward Functional Goals (PT) progress toward functional goals is fair  -WM               User Key  (r) = Recorded By, (t) = Taken By, (c) = Cosigned By      Initials Name Provider Type    RASHARD Karon Bolton, RN Registered Nurse    Carlos Anguiano RN Registered Nurse    Carson Johnson PTA Physical Therapist Assistant                  Section G              Section GG                       Physical Therapy Education       Title: PT OT SLP Therapies (Done)       Topic: Physical Therapy (Done)       Point: Mobility training (Done)       Learning Progress Summary             Patient Acceptance, E,TB, VU by MOE at 11/8/2023 1341                         Point: Precautions (Done)       Learning Progress Summary             Patient Acceptance, E,TB, VU by MOE at 11/8/2023 1341                                         User Key       Initials Effective Dates Name Provider Type Discipline     06/03/21 -  Merlin Rao, PT Physical Therapist PT                  PT Recommendation and Plan     Progress Summary (PT)  Progress Toward Functional Goals (PT): progress toward functional goals is fair  Daily Progress Summary (PT): Pt has increased pain in LLE and was unable to tolerate LLE exercises or transfers   Outcome Measures       Row Name 11/27/23 1210 11/25/23 1200 11/24/23 1233       How much help from another person do you currently need...    Turning from your back to your side while in flat bed without using bedrails? 2  -WM 2  -CS 2  -WM    Moving from lying on back to sitting on the side of a flat bed without bedrails? 2  -WM 2  -CS 2  -WM    Moving to and from a bed to a chair (including a wheelchair)? 1  -WM 1  -CS 1  -WM    Standing up from a chair using your arms (e.g., wheelchair, bedside chair)? 1   -WM 1  -CS 1  -WM    Climbing 3-5 steps with a railing? 1  -WM 1  -CS 1  -WM    To walk in hospital room? 1  -WM 1  -CS 1  -WM    AM-PAC 6 Clicks Score (PT) 8  -WM 8  -CS 8  -WM    Highest Level of Mobility Goal 3 --> Sit at edge of bed  -WM 3 --> Sit at edge of bed  -CS 3 --> Sit at edge of bed  -WM       Functional Assessment    Outcome Measure Options -- AM-PAC 6 Clicks Basic Mobility (PT)  -CS --              User Key  (r) = Recorded By, (t) = Taken By, (c) = Cosigned By      Initials Name Provider Type    WM Carson Inman PTA Physical Therapist Assistant    Ronald Huerta PTA Physical Therapist Assistant                     Time Calculation:    PT Charges       Row Name 11/27/23 1203             Time Calculation    PT Received On 11/27/23  -WM         Timed Charges    45656 - PT Therapeutic Exercise Minutes 17  -WM      67303 - PT Therapeutic Activity Minutes 22  -WM         SNF Physical Therapy Minutes    Skilled Minutes- PT 39 min  -WM         Total Minutes    Timed Charges Total Minutes 39  -WM       Total Minutes 39  -WM                User Key  (r) = Recorded By, (t) = Taken By, (c) = Cosigned By      Initials Name Provider Type     Carson Inman PTA Physical Therapist Assistant                      PT G-Codes  Outcome Measure Options: AM-PAC 6 Clicks Basic Mobility (PT)  AM-PAC 6 Clicks Score (PT): 8  AM-PAC 6 Clicks Score (OT): 14    Carson Inman PTA  11/27/2023

## 2023-11-28 LAB
GLUCOSE BLDC GLUCOMTR-MCNC: 145 MG/DL (ref 70–99)
GLUCOSE BLDC GLUCOMTR-MCNC: 153 MG/DL (ref 70–99)
GLUCOSE BLDC GLUCOMTR-MCNC: 155 MG/DL (ref 70–99)
GLUCOSE BLDC GLUCOMTR-MCNC: 159 MG/DL (ref 70–99)

## 2023-11-28 PROCEDURE — 97110 THERAPEUTIC EXERCISES: CPT

## 2023-11-28 PROCEDURE — 97530 THERAPEUTIC ACTIVITIES: CPT

## 2023-11-28 PROCEDURE — 63710000001 INSULIN DETEMIR PER 5 UNITS: Performed by: PHYSICIAN ASSISTANT

## 2023-11-28 PROCEDURE — 63710000001 INSULIN LISPRO (HUMAN) PER 5 UNITS: Performed by: INTERNAL MEDICINE

## 2023-11-28 PROCEDURE — 82948 REAGENT STRIP/BLOOD GLUCOSE: CPT

## 2023-11-28 RX ADMIN — FERROUS SULFATE TAB 325 MG (65 MG ELEMENTAL FE) 325 MG: 325 (65 FE) TAB at 08:05

## 2023-11-28 RX ADMIN — INSULIN DETEMIR 60 UNITS: 100 INJECTION, SOLUTION SUBCUTANEOUS at 21:34

## 2023-11-28 RX ADMIN — HYDROCORTISONE 1 APPLICATION: 1 OINTMENT TOPICAL at 21:34

## 2023-11-28 RX ADMIN — PREGABALIN 100 MG: 100 CAPSULE ORAL at 21:33

## 2023-11-28 RX ADMIN — LISINOPRIL 2.5 MG: 2.5 TABLET ORAL at 08:05

## 2023-11-28 RX ADMIN — Medication 250 MG: at 08:05

## 2023-11-28 RX ADMIN — PREGABALIN 100 MG: 100 CAPSULE ORAL at 08:05

## 2023-11-28 RX ADMIN — OXYCODONE AND ACETAMINOPHEN 1 TABLET: 7.5; 325 TABLET ORAL at 03:42

## 2023-11-28 RX ADMIN — OXYCODONE AND ACETAMINOPHEN 1 TABLET: 7.5; 325 TABLET ORAL at 09:40

## 2023-11-28 RX ADMIN — HYDROCORTISONE 1 APPLICATION: 1 OINTMENT TOPICAL at 08:05

## 2023-11-28 RX ADMIN — INSULIN LISPRO 4 UNITS: 100 INJECTION, SOLUTION INTRAVENOUS; SUBCUTANEOUS at 21:33

## 2023-11-28 RX ADMIN — CYANOCOBALAMIN TAB 500 MCG 1000 MCG: 500 TAB at 08:05

## 2023-11-28 RX ADMIN — APIXABAN 5 MG: 5 TABLET, FILM COATED ORAL at 08:05

## 2023-11-28 RX ADMIN — OXYCODONE AND ACETAMINOPHEN 1 TABLET: 7.5; 325 TABLET ORAL at 23:23

## 2023-11-28 RX ADMIN — OXYCODONE AND ACETAMINOPHEN 1 TABLET: 7.5; 325 TABLET ORAL at 17:18

## 2023-11-28 RX ADMIN — INSULIN DETEMIR 70 UNITS: 100 INJECTION, SOLUTION SUBCUTANEOUS at 08:04

## 2023-11-28 RX ADMIN — EMPAGLIFLOZIN 10 MG: 10 TABLET, FILM COATED ORAL at 08:05

## 2023-11-28 RX ADMIN — INSULIN LISPRO 4 UNITS: 100 INJECTION, SOLUTION INTRAVENOUS; SUBCUTANEOUS at 12:07

## 2023-11-28 RX ADMIN — Medication 250 MG: at 21:33

## 2023-11-28 RX ADMIN — HYDROCORTISONE 1 APPLICATION: 1 OINTMENT TOPICAL at 17:18

## 2023-11-28 RX ADMIN — APIXABAN 5 MG: 5 TABLET, FILM COATED ORAL at 21:33

## 2023-11-28 RX ADMIN — BACLOFEN 10 MG: 10 TABLET ORAL at 14:13

## 2023-11-28 RX ADMIN — INSULIN LISPRO 4 UNITS: 100 INJECTION, SOLUTION INTRAVENOUS; SUBCUTANEOUS at 08:04

## 2023-11-28 RX ADMIN — ATORVASTATIN CALCIUM 10 MG: 10 TABLET, FILM COATED ORAL at 21:33

## 2023-11-28 NOTE — PLAN OF CARE
Goal Outcome Evaluation:  Plan of Care Reviewed With: patient           Outcome Evaluation: No significant changes this shift. He was medicated x 2 and it was effective. Will continue with his plan of care. Call light and personal items within reach.

## 2023-11-28 NOTE — THERAPY TREATMENT NOTE
Patient Name: Jose Shaikh  : 1960    MRN: [id number]                              Today's Date: 2023       Admit Date: 2023    Visit Dx:     ICD-10-CM ICD-9-CM   1. Difficulty in walking  R26.2 719.7     Patient Active Problem List   Diagnosis    Diabetic ulcer of left heel associated with type 2 DM    Acute osteomyelitis of left calcaneus     Diabetic ulcer of left heel associated with type 2 DM    Diabetic ulcer of right midfoot associated with type 2 DM    Paroxysmal atrial fibrillation    Essential hypertension    Hyperlipidemia LDL goal <100    Cellulitis and abscess of foot    High alkaline phosphatase level    Osteomyelitis    Onychomycosis    Onychocryptosis    Foot pain, bilateral    Osteomyelitis of foot, right, acute    Cellulitis of right foot    Type 2 diabetes mellitus, with long-term current use of insulin    Class 3 severe obesity due to excess calories with serious comorbidity and body mass index (BMI) of 45.0 to 49.9 in adult    Anxiety disorder, unspecified    Claustrophobia    Dependence on wheelchair    Depression, unspecified    Long term (current) use of anticoagulants    Long term (current) use of oral hypoglycemic drugs    Wound of foot    Non-prs chronic ulcer oth prt r foot limited to brkdwn skin    Orthostatic hypotension    Other chronic osteomyelitis, right ankle and foot    Personal history of nicotine dependence    Thrombocytopenia, unspecified    Unspecified open wound, right foot, initial encounter    Diabetic foot infection    Subacute osteomyelitis of right foot    Right foot pain    Sepsis    Onychomycosis    Foot pain, left    Impaired mobility and ADLs    Absence of toe of right foot    Corns and callosity    Disability of walking    Fracture    Limb swelling    Polyneuropathy    Pressure ulcer, stage 1    Shortness of breath    Generalized weakness    Debility     Past Medical History:   Diagnosis Date    Absence of toe of right foot     Acute  osteomyelitis of left calcaneus  08/18/2021    Anxiety and depression     Arthritis     Cancer     Claustrophobia     Corns and callus     Diabetic ulcer of left heel associated with type 2 DM 08/18/2021    Diabetic ulcer of left heel associated with type 2 DM 07/06/2021    Diabetic ulcer of right midfoot associated with type 2 DM 08/18/2021    Difficulty walking     Essential hypertension 08/31/2021    Hammertoe     Hyperlipidemia LDL goal <100 08/31/2021    Ingrown toenail     Obesity     Paroxysmal atrial fibrillation 08/31/2021    Polyneuropathy     Pressure ulcer, stage 1     Tinea unguium     Type 2 diabetes mellitus with polyneuropathy      Past Surgical History:   Procedure Laterality Date    CYST REMOVAL      center of back; benign    EYE SURGERY      INCISION AND DRAINAGE ABSCESS      back    INCISION AND DRAINAGE LEG Right 12/10/2021    Procedure: INCISION AND DRAINAGE LOWER EXTREMITY;  Surgeon: Ash Leyva DPM;  Location: [facility];  Service: Podiatry;  Laterality: Right;    OTHER SURGICAL HISTORY      Surgical clips left foot    TOE SURGERY Right     Removal of 5th toe    TRANS METATARSAL AMPUTATION Right 12/02/2021    Procedure: AMPUTATION TRANS METATARSAL;  Surgeon: Ash Leyva DPM;  Location: [facility];  Service: Podiatry;  Laterality: Right;    VASCULAR SURGERY      WRIST SURGERY Left     repair of injury      General Information       Row Name 11/28/23 1018          OT Time and Intention    Document Type therapy note (daily note)  -PG     Mode of Treatment individual therapy;occupational therapy  -PG               User Key  (r) = Recorded By, (t) = Taken By, (c) = Cosigned By      Initials Name Provider Type    PG Kodak Matos OT Occupational Therapist                     Mobility/ADL's       Row Name 11/28/23 1018          Transfers    Transfers sit-stand transfer;stand-sit transfer  -PG       Row Name 11/28/23 1018          Sit-Stand Transfer    Sit-Stand  [place] (Transfers) maximum assist (25% patient effort);verbal cues;2 person assist  -PG     Assistive Device (Sit-Stand Transfers) bariatric;walker, front-wheeled  -PG               User Key  (r) = Recorded By, (t) = Taken By, (c) = Cosigned By      Initials Name Provider Type    Kodak Velazco OT Occupational Therapist                   Obj/Interventions    No documentation.                  Goals/Plan    No documentation.                  Clinical Impression       Row Name 11/28/23 1019          Plan of Care Review    Progress no change  -PG               User Key  (r) = Recorded By, (t) = Taken By, (c) = Cosigned By      Initials Name Provider Type    Kodak Velazco OT Occupational Therapist                   Outcome Measures       Row Name 11/28/23 1019          How much help from another is currently needed...    Putting on and taking off regular lower body clothing? 1  -PG     Bathing (including washing, rinsing, and drying) 2  -PG     Toileting (which includes using toilet bed pan or urinal) 1  -PG     Putting on and taking off regular upper body clothing 4  -PG     Taking care of personal grooming (such as brushing teeth) 4  -PG     Eating meals 4  -PG     AM-PAC 6 Clicks Score (OT) 16  -PG       Row Name 11/28/23 1019          Functional Assessment    Outcome Measure Options AM-PAC 6 Clicks Daily Activity (OT);Optimal Instrument  -PG       Row Name 11/28/23 1019          Optimal Instrument    Optimal Instrument Optimal - 3  -PG     Bending/Stooping 5  -PG     Standing 4  -PG     Reaching 1  -PG               User Key  (r) = Recorded By, (t) = Taken By, (c) = Cosigned By      Initials Name Provider Type    Kodak Velazco OT Occupational Therapist                    Occupational Therapy Education       Title: PT OT SLP Therapies (Done)       Topic: Occupational Therapy (Done)       Point: ADL training (Done)       Description:   Instruct learner(s) on proper safety adaptation and remediation  techniques during self care or transfers.   Instruct in proper use of assistive devices.                  Learning Progress Summary             Patient Acceptance, E,D, DU by PG at 11/7/2023 0932                         Point: Home exercise program (Done)       Description:   Instruct learner(s) on appropriate technique for monitoring, assisting and/or progressing therapeutic exercises/activities.                  Learning Progress Summary             Patient Acceptance, E,D, DU by PG at 11/7/2023 0932                         Point: Precautions (Done)       Description:   Instruct learner(s) on prescribed precautions during self-care and functional transfers.                  Learning Progress Summary             Patient Acceptance, E,D, DU by PG at 11/7/2023 0932                         Point: Body mechanics (Done)       Description:   Instruct learner(s) on proper positioning and spine alignment during self-care, functional mobility activities and/or exercises.                  Learning Progress Summary             Patient Acceptance, E,D, DU by PG at 11/7/2023 0932                                         User Key       Initials Effective Dates Name Provider Type Discipline    PG 06/16/21 -  Kodak Matos OT Occupational Therapist OT                  OT Recommendation and Plan  Planned Therapy Interventions (OT): activity tolerance training, BADL retraining, strengthening exercise, transfer/mobility retraining, patient/caregiver education/training, occupation/activity based interventions  Therapy Frequency (OT): 5 times/wk  Plan of Care Review  Plan of Care Reviewed With: patient  Progress: no change  Outcome Evaluation: No significant change noted.  Patient continues to participate in upper body strengthening exercises and sit to stand transfers with moderate to maximal assist x 2 required.  Patient stood for approximately 1 minute today.  Patient then attempted to walk with the bed being pushed behind him.   Patient however unable to stand at this point.  Continue OT services per plan of care     Time Calculation:   Evaluation Complexity (OT)  Review Occupational Profile/Medical/Therapy History Complexity: brief/low complexity  Assessment, Occupational Performance/Identification of Deficit Complexity: 3-5 performance deficits  Clinical Decision Making Complexity (OT): problem focused assessment/low complexity  Overall Complexity of Evaluation (OT): low complexity     Time Calculation- OT       Row Name 11/28/23 1021             Time Calculation- OT    OT Received On 11/28/23  -PG      OT Goal Re-Cert Due Date 12/27/23  -PG         Timed Charges    90099 - OT Therapeutic Activity Minutes 25  -PG         Total Minutes    Timed Charges Total Minutes 25  -PG       Total Minutes 25  -PG                User Key  (r) = Recorded By, (t) = Taken By, (c) = Cosigned By      Initials Name Provider Type    PG Kodak Matos OT Occupational Therapist                  Therapy Charges for Today       Code Description Service Date Service Provider Modifiers Qty    69611943923 HC OT THER PROC EA 15 MIN 11/27/2023 Kodak Matos OT GO 2    85663325495 HC OT THERAPEUTIC ACT EA 15 MIN 11/27/2023 Kodak Matos OT GO 1    97375543761 HC OT THERAPEUTIC ACT EA 15 MIN 11/28/2023 Kodak Matos OT GO 2                 Kodak Matos OT  11/28/2023

## 2023-11-28 NOTE — SIGNIFICANT NOTE
Wound Eval / Progress Noted     Angelica     Patient Name: Jose Shaikh  : 1960  MRN: [id number]  Today's Date: 2023                 Admit Date: 2023    Visit Dx:    ICD-10-CM ICD-9-CM   1. Difficulty in walking  R26.2 719.7         Debility        Past Medical History:   Diagnosis Date    Absence of toe of right foot     Acute osteomyelitis of left calcaneus  2021    Anxiety and depression     Arthritis     Cancer     Claustrophobia     Corns and callus     Diabetic ulcer of left heel associated with type 2 DM 2021    Diabetic ulcer of left heel associated with type 2 DM 2021    Diabetic ulcer of right midfoot associated with type 2 DM 2021    Difficulty walking     Essential hypertension 2021    Hammertoe     Hyperlipidemia LDL goal <100 2021    Ingrown toenail     Obesity     Paroxysmal atrial fibrillation 2021    Polyneuropathy     Pressure ulcer, stage 1     Tinea unguium     Type 2 diabetes mellitus with polyneuropathy         Past Surgical History:   Procedure Laterality Date    CYST REMOVAL      center of back; benign    EYE SURGERY      INCISION AND DRAINAGE ABSCESS      back    INCISION AND DRAINAGE LEG Right 12/10/2021    Procedure: INCISION AND DRAINAGE LOWER EXTREMITY;  Surgeon: Ash Leyva DPM;  Location: [facility] OR;  Service: Podiatry;  Laterality: Right;    OTHER SURGICAL HISTORY      Surgical clips left foot    TOE SURGERY Right     Removal of 5th toe    TRANS METATARSAL AMPUTATION Right 2021    Procedure: AMPUTATION TRANS METATARSAL;  Surgeon: Ash Leyva DPM;  Location: [facility] OR;  Service: Podiatry;  Laterality: Right;    VASCULAR SURGERY      WRIST SURGERY Left     repair of injury         Physical Assessment:     23 1446   Wound 23 1312 Left anterior second toe Blisters   Placement Date/Time: 23 1312   Present on Original Admission: No  Side: Left  Orientation: anterior   Location: second toe  Primary Wound Type: Blisters   Wound Image     Dressing Appearance open to air   Closure None   Base red;scab;black eschar   Periwound intact;pink   Periwound Temperature warm   Periwound Skin Turgor soft   Edges rolled/closed   Wound Length (cm) 1.2 cm  (posterior 0.3cm)   Wound Width (cm) 0.3 cm  (posterior aspect 0.2cm)   Wound Surface Area (cm^2) 0.36 cm^2   Drainage Amount none   Care, Wound cleansed with;sterile normal saline   Dressing Care open to air   Wound 11/07/23 1240 Right anterior foot   Placement Date/Time: 11/07/23 1240   Side: Right  Orientation: anterior  Location: foot   Wound Image    Dressing Appearance intact;moist drainage   Closure None   Base red;pink;moist   Periwound pink   Periwound Temperature warm   Periwound Skin Turgor soft   Edges open;callused   Wound Length (cm) 1 cm   Wound Width (cm) 1.7 cm  (callus formation 1.3cm)   Wound Depth (cm) 0.1 cm  (callus formation 0.7cm)   Wound Surface Area (cm^2) 1.7 cm^2   Wound Volume (cm^3) 0.17 cm^3   Drainage Characteristics/Odor serous   Drainage Amount scant   Care, Wound cleansed with;sterile normal saline   Dressing Care dressing applied;gauze;gauze, dry;non-adherent;petroleum-based   Periwound Care absorptive dressing applied   Wound 11/07/23 1238 Left lower arm   Placement Date/Time: 11/07/23 1238   Side: Left  Orientation: lower  Location: arm   Wound Image    Dressing Appearance open to air;dried drainage   Closure Steri strips   Base red;dry;moist   Periwound intact;ecchymotic   Periwound Temperature warm   Periwound Skin Turgor soft   Edges rolled/closed;open   Drainage Characteristics/Odor serosanguineous   Drainage Amount small   Care, Wound cleansed with;sterile normal saline   Dressing Care dressing applied;border dressing;non-adherent;silicone;petroleum-based   Periwound Care absorptive dressing applied          Wound Check / Follow-up:  Patient seen today for wound follow-up / wound check.     Patient  remains in skilled nursing for rehab. He is currently on a bariatric low height bed with trapeze to facility and promote mobility and accommodate body size.     Patient with traumatic injury to right anterior residual limb that is overall improving. He continues to have small open wound with  pink moist tissue. There is some darkened callus formation distally to open wound. Cleansed all areas with NS and gauze. Blotted dry. Recommending to continue non-adherent petroleum based gauze and gauze dressing to site at present time.     Patient has small area of black eschar to left posterior 2nd toe along base of toe nail. Cleansed with NS and gauze. Recommending to continue skin care. Patient with crusting along dorsal aspect of left 2nd toe. Cleansed with NS and gauze. Skin care hygiene recommended.     Moisture and redness noted within skin folds. Recommending to continue skin barrier / protection along with moisture prevention.   Moisture and redness also present to gluteal aspects, bilateral ischial tuberosities and within gluteal crease. No open wounds noted at present time. Skin protection and moisture prevention recommended.     Skin tear to left lower arm. There is one area with two remaining steri-strips that were removed this assessment. Skin under steri-strips is dried and approximated with no open tissue or drainage. Immediately next to this area there are two small areas of Level 2 skin tears with open red moist tissue with dried bloody drainage noted. Cleansed with NS and gauze. Blotted dry. Recommending skin tear protocol to this area.       Impression: Traumatic injury to right residual limb. Crusting and black eschar to left 2nd toe. Moisture and redness within skin folds. Skin tear to left lower arm    Short term goals:  Regain skin integrity. Daily dressing change. Skin tear protocol. Skin protection, moisture prevention, pressure reduction.     Bettye Crews RN    11/28/2023    14:51 EST

## 2023-11-28 NOTE — THERAPY TREATMENT NOTE
SNF - Physical Therapy Treatment Note  [facility]ing[facility]     Patient Name: Jose Shaikh  : 1960  MRN: [id number]  Today's Date: 2023      Visit Dx:    ICD-10-CM ICD-9-CM   1. Difficulty in walking  R26.2 719.7     Patient Active Problem List   Diagnosis    Diabetic ulcer of left heel associated with type 2 DM    Acute osteomyelitis of left calcaneus     Diabetic ulcer of left heel associated with type 2 DM    Diabetic ulcer of right midfoot associated with type 2 DM    Paroxysmal atrial fibrillation    Essential hypertension    Hyperlipidemia LDL goal <100    Cellulitis and abscess of foot    High alkaline phosphatase level    Osteomyelitis    Onychomycosis    Onychocryptosis    Foot pain, bilateral    Osteomyelitis of foot, right, acute    Cellulitis of right foot    Type 2 diabetes mellitus, with long-term current use of insulin    Class 3 severe obesity due to excess calories with serious comorbidity and body mass index (BMI) of 45.0 to 49.9 in adult    Anxiety disorder, unspecified    Claustrophobia    Dependence on wheelchair    Depression, unspecified    Long term (current) use of anticoagulants    Long term (current) use of oral hypoglycemic drugs    Wound of foot    Non-prs chronic ulcer oth prt r foot limited to brkdwn skin    Orthostatic hypotension    Other chronic osteomyelitis, right ankle and foot    Personal history of nicotine dependence    Thrombocytopenia, unspecified    Unspecified open wound, right foot, initial encounter    Diabetic foot infection    Subacute osteomyelitis of right foot    Right foot pain    Sepsis    Onychomycosis    Foot pain, left    Impaired mobility and ADLs    Absence of toe of right foot    Corns and callosity    Disability of walking    Fracture    Limb swelling    Polyneuropathy    Pressure ulcer, stage 1    Shortness of breath    Generalized weakness    Debility     Past Medical History:   Diagnosis Date    Absence of toe of right foot     Acute osteomyelitis  of left calcaneus  08/18/2021    Anxiety and depression     Arthritis     Cancer     Claustrophobia     Corns and callus     Diabetic ulcer of left heel associated with type 2 DM 08/18/2021    Diabetic ulcer of left heel associated with type 2 DM 07/06/2021    Diabetic ulcer of right midfoot associated with type 2 DM 08/18/2021    Difficulty walking     Essential hypertension 08/31/2021    Hammertoe     Hyperlipidemia LDL goal <100 08/31/2021    Ingrown toenail     Obesity     Paroxysmal atrial fibrillation 08/31/2021    Polyneuropathy     Pressure ulcer, stage 1     Tinea unguium     Type 2 diabetes mellitus with polyneuropathy      Past Surgical History:   Procedure Laterality Date    CYST REMOVAL      center of back; benign    EYE SURGERY      INCISION AND DRAINAGE ABSCESS      back    INCISION AND DRAINAGE LEG Right 12/10/2021    Procedure: INCISION AND DRAINAGE LOWER EXTREMITY;  Surgeon: Ash Leyva DPM;  Location: [facility];  Service: Podiatry;  Laterality: Right;    OTHER SURGICAL HISTORY      Surgical clips left foot    TOE SURGERY Right     Removal of 5th toe    TRANS METATARSAL AMPUTATION Right 12/02/2021    Procedure: AMPUTATION TRANS METATARSAL;  Surgeon: Ash Leyva DPM;  Location: [facility];  Service: Podiatry;  Laterality: Right;    VASCULAR SURGERY      WRIST SURGERY Left     repair of injury       PT Assessment (last 12 hours)       PT Evaluation and Treatment       Row Name 11/28/23 1241          Physical Therapy Time and Intention    Subjective Information complains of;pain  -WM     Document Type therapy note (daily note)  -WM     Mode of Treatment individual therapy;physical therapy  -WM     Patient Effort good  -WM     Symptoms Noted During/After Treatment fatigue  -WM       Row Name 11/28/23 1241          Pain Scale: FACES Pre/Post-Treatment    Pain: FACES Scale, Pretreatment 2-->hurts little bit  -WM     Posttreatment Pain Rating 6-->hurts even more  -WM      Pain Location - Side/Orientation Left  -WM     Pain Location generalized  -WM     Pain Location - hip;knee  -       Row Name 11/28/23 1241          Bed Mobility    Supine-Sit [place] (Bed Mobility) verbal cues;moderate assist (50% patient effort);1 person assist;1 person to manage equipment  -WM     Sit-Supine [place] (Bed Mobility) moderate assist (50% patient effort);1 person assist;1 person to manage equipment  -WM     Assistive Device (Bed Mobility) bed rails;draw sheet;head of bed elevated;leg ;overhead trapeze  -       Row Name 11/28/23 1241          Sit-Stand Transfer    Sit-Stand [place] (Transfers) maximum assist (25% patient effort);2 person assist;verbal cues  -     Assistive Device (Sit-Stand Transfers) bariatric;walker, front-wheeled  -WM       Row Name 11/28/23 1241          Stand-Sit Transfer    Stand-Sit [place] (Transfers) maximum assist (25% patient effort);2 person assist;verbal cues  -     Assistive Device (Stand-Sit Transfers) bariatric;walker, front-wheeled  -       Row Name 11/28/23 1241          Gait/Stairs (Locomotion)    Comment, (Gait/Stairs) Pt attempted to walk. He was only able to advance his LLE  about one -two inches, but was not able to move his RLE  -       Row Name 11/28/23 1241          Safety Issues, Functional Mobility    Impairments Affecting Function (Mobility) balance;endurance/activity tolerance;pain;strength  -       Row Name 11/28/23 1241          Hip (Therapeutic Exercise)    Hip AAROM (Therapeutic Exercise) bilateral;aBduction;aDduction;supine;10 repetitions;2 sets  -     Hip Isometrics (Therapeutic Exercise) bilateral;gluteal sets;supine;10 repetitions;3 second hold;2 sets  -     Hip Strengthening (Therapeutic Exercise) right;heel slides;supine;20 repititions  Manual resistance  -       Row Name 11/28/23 1241          Knee (Therapeutic Exercise)    Knee Isometrics (Therapeutic Exercise) bilateral;quad sets;supine;10  repetitions;3 second hold;2 sets  -WM     Knee Strengthening (Therapeutic Exercise) bilateral;SLR (straight leg raise);SAQ (short arc quad);supine;3 lb free weight;20 repititions  Active-assisted SLR,  3 lb wt used with SAQ  -WM       Row Name 11/28/23 1241          Ankle (Therapeutic Exercise)    Ankle AROM (Therapeutic Exercise) bilateral;dorsiflexion;plantarflexion;supine;20 repititions  -WM       Row Name             Wound 11/01/23 1312 Left anterior second toe Blisters    Wound - Properties Group Placement Date: 11/01/23  -RASHARD Placement Time: 1312  -RASHARD Present on Original Admission: N  -RASHARD Side: Left  -RASHARD Orientation: anterior  -RASHARD Location: second toe  -RASHARD Primary Wound Type: Blisters  -RASHARD    Retired Wound - Properties Group Placement Date: 11/01/23  -RASHARD Placement Time: 1312  -RASHARD Present on Original Admission: N  -RASHARD Side: Left  -RASHARD Orientation: anterior  -RASHARD Location: second toe  -RASHARD Primary Wound Type: Blisters  -RASHARD    Retired Wound - Properties Group Date first assessed: 11/01/23  - Time first assessed: 1312  -RASHARD Present on Original Admission: N  -RASHARD Side: Left  -RASHARD Location: second toe  -RASHARD Primary Wound Type: Blisters  -RASHARD      Row Name             Wound 11/07/23 1240 Right anterior foot    Wound - Properties Group Placement Date: 11/07/23  - Placement Time: 1240  -FH Side: Right  -FH Orientation: anterior  -FH Location: foot  -FH    Retired Wound - Properties Group Placement Date: 11/07/23  - Placement Time: 1240  -FH Side: Right  -FH Orientation: anterior  -FH Location: foot  -FH    Retired Wound - Properties Group Date first assessed: 11/07/23  - Time first assessed: 1240  -FH Side: Right  -FH Location: foot  -FH      Row Name             Wound 11/07/23 1238 Left lower arm    Wound - Properties Group Placement Date: 11/07/23  - Placement Time: 1238  -FH Side: Left  -FH Orientation: lower  -FH Location: arm  -FH    Retired Wound - Properties Group Placement Date: 11/07/23  - Placement Time:  1238  -FH Side: Left  -FH Orientation: lower  -FH Location: arm  -FH    Retired Wound - Properties Group Date first assessed: 11/07/23  -FH Time first assessed: 1238  -FH Side: Left  -FH Location: arm  -FH      Row Name 11/28/23 1241          Positioning and Restraints    Pre-Treatment Position in bed  -WM     Post Treatment Position bed  -WM     In Bed fowlers;call light within reach;exit alarm on  -WM       Row Name 11/28/23 1241          Progress Summary (PT)    Progress Toward Functional Goals (PT) progress toward functional goals is good  -WM               User Key  (r) = Recorded By, (t) = Taken By, (c) = Cosigned By      Initials Name Provider Type    Karon Jordan, RN Registered Nurse    Carlos Anguiano RN Registered Nurse    Carson Johnson PTA Physical Therapist Assistant                  Section G              Section GG                       Physical Therapy Education       Title: PT OT SLP Therapies (Done)       Topic: Physical Therapy (Done)       Point: Mobility training (Done)       Learning Progress Summary             Patient Acceptance, E,TB, VU by MOE at 11/8/2023 1341                         Point: Precautions (Done)       Learning Progress Summary             Patient Acceptance, E,TB, VU by MOE at 11/8/2023 1341                                         User Key       Initials Effective Dates Name Provider Type Discipline    MOE 06/03/21 -  Merlin Rao, PT Physical Therapist PT                  PT Recommendation and Plan     Progress Summary (PT)  Progress Toward Functional Goals (PT): progress toward functional goals is good  Daily Progress Summary (PT): Pt has increased pain in LLE and was unable to tolerate LLE exercises or transfers   Outcome Measures       Row Name 11/28/23 1250 11/27/23 1210          How much help from another person do you currently need...    Turning from your back to your side while in flat bed without using bedrails? 2  -WM 2  -WM     Moving from  lying on back to sitting on the side of a flat bed without bedrails? 2  -WM 2  -WM     Moving to and from a bed to a chair (including a wheelchair)? 1  -WM 1  -WM     Standing up from a chair using your arms (e.g., wheelchair, bedside chair)? 1  -WM 1  -WM     Climbing 3-5 steps with a railing? 1  -WM 1  -WM     To walk in hospital room? 1  -WM 1  -WM     AM-PAC 6 Clicks Score (PT) 8  -WM 8  -WM     Highest Level of Mobility Goal 3 --> Sit at edge of bed  -WM 3 --> Sit at edge of bed  -WM               User Key  (r) = Recorded By, (t) = Taken By, (c) = Cosigned By      Initials Name Provider Type    Carson Johnson PTA Physical Therapist Assistant                     Time Calculation:    PT Charges       Row Name 11/28/23 1239             Time Calculation    PT Received On 11/28/23  -WM         Timed Charges    97187 - PT Therapeutic Exercise Minutes 14  -WM      36370 - PT Therapeutic Activity Minutes 25  -WM         SNF Physical Therapy Minutes    Skilled Minutes- PT 39 min  -WM         Total Minutes    Timed Charges Total Minutes 39  -WM       Total Minutes 39  -WM                User Key  (r) = Recorded By, (t) = Taken By, (c) = Cosigned By      Initials Name Provider Type    Carson Johnson PTA Physical Therapist Assistant                  Therapy Charges for Today       Code Description Service Date Service Provider Modifiers Qty    57329380445 HC PT THER PROC EA 15 MIN 11/27/2023 Carson Inman PTA GP 1    07380985198 HC PT THERAPEUTIC ACT EA 15 MIN 11/27/2023 Carson Inman PTA GP 2            PT G-Codes  Outcome Measure Options: AM-PAC 6 Clicks Daily Activity (OT), Optimal Instrument  AM-PAC 6 Clicks Score (PT): 8  AM-PAC 6 Clicks Score (OT): 16    Carson Inman PTA  11/28/2023

## 2023-11-28 NOTE — PLAN OF CARE
Goal Outcome Evaluation:  Plan of Care Reviewed With: patient        Progress: improving  Outcome Evaluation: Resident alert, oriented able to make needs known to staff. Stood with therapy this morning and attempted to take one step. remained in bed for remainer of shift. Blood glucose elevated for BF and lunch, covered with sliding scale. Stable at dinner. medicated for prn pain x2, effective. medicated with prn baclofen per request, effective. resident pleasant and coopertive.

## 2023-11-29 LAB
GLUCOSE BLDC GLUCOMTR-MCNC: 138 MG/DL (ref 70–99)
GLUCOSE BLDC GLUCOMTR-MCNC: 175 MG/DL (ref 70–99)
GLUCOSE BLDC GLUCOMTR-MCNC: 180 MG/DL (ref 70–99)
GLUCOSE BLDC GLUCOMTR-MCNC: 86 MG/DL (ref 70–99)

## 2023-11-29 PROCEDURE — 63710000001 INSULIN DETEMIR PER 5 UNITS: Performed by: PHYSICIAN ASSISTANT

## 2023-11-29 PROCEDURE — 97110 THERAPEUTIC EXERCISES: CPT

## 2023-11-29 PROCEDURE — 82948 REAGENT STRIP/BLOOD GLUCOSE: CPT

## 2023-11-29 PROCEDURE — 63710000001 INSULIN LISPRO (HUMAN) PER 5 UNITS: Performed by: INTERNAL MEDICINE

## 2023-11-29 PROCEDURE — 97530 THERAPEUTIC ACTIVITIES: CPT

## 2023-11-29 RX ADMIN — OXYCODONE AND ACETAMINOPHEN 1 TABLET: 7.5; 325 TABLET ORAL at 05:32

## 2023-11-29 RX ADMIN — INSULIN LISPRO 4 UNITS: 100 INJECTION, SOLUTION INTRAVENOUS; SUBCUTANEOUS at 21:21

## 2023-11-29 RX ADMIN — INSULIN DETEMIR 60 UNITS: 100 INJECTION, SOLUTION SUBCUTANEOUS at 21:21

## 2023-11-29 RX ADMIN — LISINOPRIL 2.5 MG: 2.5 TABLET ORAL at 10:16

## 2023-11-29 RX ADMIN — ATORVASTATIN CALCIUM 10 MG: 10 TABLET, FILM COATED ORAL at 21:21

## 2023-11-29 RX ADMIN — FERROUS SULFATE TAB 325 MG (65 MG ELEMENTAL FE) 325 MG: 325 (65 FE) TAB at 10:16

## 2023-11-29 RX ADMIN — CYANOCOBALAMIN TAB 500 MCG 1000 MCG: 500 TAB at 10:15

## 2023-11-29 RX ADMIN — HYDROCORTISONE 1 APPLICATION: 1 OINTMENT TOPICAL at 10:17

## 2023-11-29 RX ADMIN — Medication 250 MG: at 10:15

## 2023-11-29 RX ADMIN — APIXABAN 5 MG: 5 TABLET, FILM COATED ORAL at 21:20

## 2023-11-29 RX ADMIN — PREGABALIN 100 MG: 100 CAPSULE ORAL at 10:15

## 2023-11-29 RX ADMIN — EMPAGLIFLOZIN 10 MG: 10 TABLET, FILM COATED ORAL at 10:15

## 2023-11-29 RX ADMIN — HYDROCORTISONE 1 APPLICATION: 1 OINTMENT TOPICAL at 17:38

## 2023-11-29 RX ADMIN — Medication 250 MG: at 21:21

## 2023-11-29 RX ADMIN — BACLOFEN 10 MG: 10 TABLET ORAL at 10:15

## 2023-11-29 RX ADMIN — PREGABALIN 100 MG: 100 CAPSULE ORAL at 17:38

## 2023-11-29 RX ADMIN — OXYCODONE AND ACETAMINOPHEN 1 TABLET: 7.5; 325 TABLET ORAL at 12:59

## 2023-11-29 RX ADMIN — OXYCODONE AND ACETAMINOPHEN 1 TABLET: 7.5; 325 TABLET ORAL at 21:20

## 2023-11-29 RX ADMIN — PREGABALIN 100 MG: 100 CAPSULE ORAL at 21:26

## 2023-11-29 RX ADMIN — APIXABAN 5 MG: 5 TABLET, FILM COATED ORAL at 10:16

## 2023-11-29 RX ADMIN — HYDROCORTISONE 1 APPLICATION: 1 OINTMENT TOPICAL at 21:21

## 2023-11-29 RX ADMIN — INSULIN LISPRO 4 UNITS: 100 INJECTION, SOLUTION INTRAVENOUS; SUBCUTANEOUS at 12:18

## 2023-11-29 RX ADMIN — INSULIN DETEMIR 70 UNITS: 100 INJECTION, SOLUTION SUBCUTANEOUS at 10:17

## 2023-11-29 NOTE — THERAPY TREATMENT NOTE
Acute Care - Physical Therapy Treatment Note  KEO Dominguez     Patient Name: Jose Shaikh  : 1960  MRN: [id number]  Today's Date: 2023      Visit Dx:     ICD-10-CM ICD-9-CM   1. Difficulty in walking  R26.2 719.7     Patient Active Problem List   Diagnosis    Diabetic ulcer of left heel associated with type 2 DM    Acute osteomyelitis of left calcaneus     Diabetic ulcer of left heel associated with type 2 DM    Diabetic ulcer of right midfoot associated with type 2 DM    Paroxysmal atrial fibrillation    Essential hypertension    Hyperlipidemia LDL goal <100    Cellulitis and abscess of foot    High alkaline phosphatase level    Osteomyelitis    Onychomycosis    Onychocryptosis    Foot pain, bilateral    Osteomyelitis of foot, right, acute    Cellulitis of right foot    Type 2 diabetes mellitus, with long-term current use of insulin    Class 3 severe obesity due to excess calories with serious comorbidity and body mass index (BMI) of 45.0 to 49.9 in adult    Anxiety disorder, unspecified    Claustrophobia    Dependence on wheelchair    Depression, unspecified    Long term (current) use of anticoagulants    Long term (current) use of oral hypoglycemic drugs    Wound of foot    Non-prs chronic ulcer oth prt r foot limited to brkdwn skin    Orthostatic hypotension    Other chronic osteomyelitis, right ankle and foot    Personal history of nicotine dependence    Thrombocytopenia, unspecified    Unspecified open wound, right foot, initial encounter    Diabetic foot infection    Subacute osteomyelitis of right foot    Right foot pain    Sepsis    Onychomycosis    Foot pain, left    Impaired mobility and ADLs    Absence of toe of right foot    Corns and callosity    Disability of walking    Fracture    Limb swelling    Polyneuropathy    Pressure ulcer, stage 1    Shortness of breath    Generalized weakness    Debility     Past Medical History:   Diagnosis Date    Absence of toe of right foot     Acute  osteomyelitis of left calcaneus  08/18/2021    Anxiety and depression     Arthritis     Cancer     Claustrophobia     Corns and callus     Diabetic ulcer of left heel associated with type 2 DM 08/18/2021    Diabetic ulcer of left heel associated with type 2 DM 07/06/2021    Diabetic ulcer of right midfoot associated with type 2 DM 08/18/2021    Difficulty walking     Essential hypertension 08/31/2021    Hammertoe     Hyperlipidemia LDL goal <100 08/31/2021    Ingrown toenail     Obesity     Paroxysmal atrial fibrillation 08/31/2021    Polyneuropathy     Pressure ulcer, stage 1     Tinea unguium     Type 2 diabetes mellitus with polyneuropathy      Past Surgical History:   Procedure Laterality Date    CYST REMOVAL      center of back; benign    EYE SURGERY      INCISION AND DRAINAGE ABSCESS      back    INCISION AND DRAINAGE LEG Right 12/10/2021    Procedure: INCISION AND DRAINAGE LOWER EXTREMITY;  Surgeon: Ash Leyva DPM;  Location: [facility];  Service: Podiatry;  Laterality: Right;    OTHER SURGICAL HISTORY      Surgical clips left foot    TOE SURGERY Right     Removal of 5th toe    TRANS METATARSAL AMPUTATION Right 12/02/2021    Procedure: AMPUTATION TRANS METATARSAL;  Surgeon: Ash Leyva DPM;  Location: [facility] OR;  Service: Podiatry;  Laterality: Right;    VASCULAR SURGERY      WRIST SURGERY Left     repair of injury     PT Assessment (last 12 hours)       PT Evaluation and Treatment       Row Name 11/29/23 1512          Physical Therapy Time and Intention    Subjective Information complains of;weakness;pain  -RH     Document Type therapy note (daily note)  -     Mode of Treatment physical therapy;individual therapy  -RH     Patient Effort adequate  -       Row Name 11/29/23 1512          Pain Scale: FACES Pre/Post-Treatment    Posttreatment Pain Rating 2-->hurts little bit  -     Pain Location - Side/Orientation Left  -     Pain Location - hip  -       Row Name              Wound 11/01/23 1312 Left anterior second toe Blisters    Wound - Properties Group Placement Date: 11/01/23  -RASHARD Placement Time: 1312  -RASHRAD Present on Original Admission: N  -RASHARD Side: Left  -RASHARD Orientation: anterior  -RASHARD Location: second toe  -RASHARD Primary Wound Type: Blisters  -RASHARD    Retired Wound - Properties Group Placement Date: 11/01/23  -RASHARD Placement Time: 1312  -RASHARD Present on Original Admission: N  -RASHARD Side: Left  -RASHARD Orientation: anterior  -RASHARD Location: second toe  -RASHARD Primary Wound Type: Blisters  -RASHARD    Retired Wound - Properties Group Date first assessed: 11/01/23  -RASHARD Time first assessed: 1312  -RASHARD Present on Original Admission: N  -RASHARD Side: Left  -RASHARD Location: second toe  -RASHARD Primary Wound Type: Blisters  -RASHARD      Row Name             Wound 11/07/23 1240 Right anterior foot    Wound - Properties Group Placement Date: 11/07/23  - Placement Time: 1240  -FH Side: Right  -FH Orientation: anterior  -FH Location: foot  -FH    Retired Wound - Properties Group Placement Date: 11/07/23  - Placement Time: 1240  -FH Side: Right  -FH Orientation: anterior  -FH Location: foot  -FH    Retired Wound - Properties Group Date first assessed: 11/07/23  - Time first assessed: 1240  -FH Side: Right  -FH Location: foot  -FH      Row Name             Wound 11/07/23 1238 Left lower arm    Wound - Properties Group Placement Date: 11/07/23  - Placement Time: 1238  -FH Side: Left  -FH Orientation: lower  -FH Location: arm  -FH    Retired Wound - Properties Group Placement Date: 11/07/23  - Placement Time: 1238  -FH Side: Left  -FH Orientation: lower  -FH Location: arm  -FH    Retired Wound - Properties Group Date first assessed: 11/07/23  - Time first assessed: 1238  -FH Side: Left  -FH Location: arm  -FH      Row Name 11/29/23 1512          Vital Signs    O2 Delivery Intra Treatment room air  -RH       Row Name 11/29/23 1512          Positioning and Restraints    In Bed call light within reach;encouraged to call  for assist;supine  -RH       Row Name 11/29/23 1512          Progress Summary (PT)    Progress Toward Functional Goals (PT) progress toward functional goals is fair  -RH               User Key  (r) = Recorded By, (t) = Taken By, (c) = Cosigned By      Initials Name Provider Type    Karon Jordan, RN Registered Nurse    Carlos Anguiano RN Registered Nurse    RH Bijan Todd PTA Physical Therapist Assistant                Bilateral Lower Extremity   Exercise  Reps  Sets    Short arc quads   10 2   Heel slides  10 2   Ankle pumps  10 4   Quad sets  10 4   Straight leg raise  10 2   Hip ab/adduction 10 2    Gluteal sets                             10                                          4                         Physical Therapy Education       Title: PT OT SLP Therapies (Done)       Topic: Physical Therapy (Done)       Point: Mobility training (Done)       Learning Progress Summary             Patient Acceptance, E,TB, VU by MOE at 11/8/2023 1341                         Point: Precautions (Done)       Learning Progress Summary             Patient Acceptance, E,TB, VU by MOE at 11/8/2023 1341                                         User Key       Initials Effective Dates Name Provider Type Discipline    MOE 06/03/21 -  Merlin Rao, PT Physical Therapist PT                  PT Recommendation and Plan     Progress Summary (PT)  Progress Toward Functional Goals (PT): progress toward functional goals is fair   Outcome Measures       Row Name 11/29/23 1500 11/28/23 1250 11/27/23 1210       How much help from another person do you currently need...    Turning from your back to your side while in flat bed without using bedrails? 2  -RH 2  -WM 2  -WM    Moving from lying on back to sitting on the side of a flat bed without bedrails? 2  -RH 2  -WM 2  -WM    Moving to and from a bed to a chair (including a wheelchair)? 1  -RH 1  -WM 1  -WM    Standing up from a chair using your arms (e.g., wheelchair,  bedside chair)? 1  -RH 1  -WM 1  -WM    Climbing 3-5 steps with a railing? 1  -RH 1  -WM 1  -WM    To walk in hospital room? 1  -RH 1  -WM 1  -WM    AM-PAC 6 Clicks Score (PT) 8  -RH 8  -WM 8  -WM    Highest Level of Mobility Goal 3 --> Sit at edge of bed  -RH 3 --> Sit at edge of bed  -WM 3 --> Sit at edge of bed  -WM              User Key  (r) = Recorded By, (t) = Taken By, (c) = Cosigned By      Initials Name Provider Type     Bijan Todd PTA Physical Therapist Assistant     Carson Inman PTA Physical Therapist Assistant                     Time Calculation:    PT Charges       Row Name 11/29/23 1511             Time Calculation    PT Received On 11/29/23  -RH         Timed Charges    63694 - PT Therapeutic Exercise Minutes 24  -RH         Total Minutes    Timed Charges Total Minutes 24  -RH       Total Minutes 24  -RH                User Key  (r) = Recorded By, (t) = Taken By, (c) = Cosigned By      Initials Name Provider Type     Bijan Todd PTA Physical Therapist Assistant                  Therapy Charges for Today       Code Description Service Date Service Provider Modifiers Qty    91101655437 HC PT THER PROC EA 15 MIN 11/29/2023 Bijan Todd PTA GP 2            PT G-Codes  Outcome Measure Options: AM-PAC 6 Clicks Daily Activity (OT), Optimal Instrument  AM-PAC 6 Clicks Score (PT): 8  AM-PAC 6 Clicks Score (OT): 16    Bijan Todd PTA  11/29/2023

## 2023-11-29 NOTE — THERAPY TREATMENT NOTE
Patient Name: Jose Shaikh  : 1960    MRN: [id number]                              Today's Date: 2023       Admit Date: 2023    Visit Dx:     ICD-10-CM ICD-9-CM   1. Difficulty in walking  R26.2 719.7     Patient Active Problem List   Diagnosis    Diabetic ulcer of left heel associated with type 2 DM    Acute osteomyelitis of left calcaneus     Diabetic ulcer of left heel associated with type 2 DM    Diabetic ulcer of right midfoot associated with type 2 DM    Paroxysmal atrial fibrillation    Essential hypertension    Hyperlipidemia LDL goal <100    Cellulitis and abscess of foot    High alkaline phosphatase level    Osteomyelitis    Onychomycosis    Onychocryptosis    Foot pain, bilateral    Osteomyelitis of foot, right, acute    Cellulitis of right foot    Type 2 diabetes mellitus, with long-term current use of insulin    Class 3 severe obesity due to excess calories with serious comorbidity and body mass index (BMI) of 45.0 to 49.9 in adult    Anxiety disorder, unspecified    Claustrophobia    Dependence on wheelchair    Depression, unspecified    Long term (current) use of anticoagulants    Long term (current) use of oral hypoglycemic drugs    Wound of foot    Non-prs chronic ulcer oth prt r foot limited to brkdwn skin    Orthostatic hypotension    Other chronic osteomyelitis, right ankle and foot    Personal history of nicotine dependence    Thrombocytopenia, unspecified    Unspecified open wound, right foot, initial encounter    Diabetic foot infection    Subacute osteomyelitis of right foot    Right foot pain    Sepsis    Onychomycosis    Foot pain, left    Impaired mobility and ADLs    Absence of toe of right foot    Corns and callosity    Disability of walking    Fracture    Limb swelling    Polyneuropathy    Pressure ulcer, stage 1    Shortness of breath    Generalized weakness    Debility     Past Medical History:   Diagnosis Date    Absence of toe of right foot     Acute  osteomyelitis of left calcaneus  08/18/2021    Anxiety and depression     Arthritis     Cancer     Claustrophobia     Corns and callus     Diabetic ulcer of left heel associated with type 2 DM 08/18/2021    Diabetic ulcer of left heel associated with type 2 DM 07/06/2021    Diabetic ulcer of right midfoot associated with type 2 DM 08/18/2021    Difficulty walking     Essential hypertension 08/31/2021    Hammertoe     Hyperlipidemia LDL goal <100 08/31/2021    Ingrown toenail     Obesity     Paroxysmal atrial fibrillation 08/31/2021    Polyneuropathy     Pressure ulcer, stage 1     Tinea unguium     Type 2 diabetes mellitus with polyneuropathy      Past Surgical History:   Procedure Laterality Date    CYST REMOVAL      center of back; benign    EYE SURGERY      INCISION AND DRAINAGE ABSCESS      back    INCISION AND DRAINAGE LEG Right 12/10/2021    Procedure: INCISION AND DRAINAGE LOWER EXTREMITY;  Surgeon: Ash Leyva DPM;  Location: [facility];  Service: Podiatry;  Laterality: Right;    OTHER SURGICAL HISTORY      Surgical clips left foot    TOE SURGERY Right     Removal of 5th toe    TRANS METATARSAL AMPUTATION Right 12/02/2021    Procedure: AMPUTATION TRANS METATARSAL;  Surgeon: Ash Leyva DPM;  Location: [facility] OR;  Service: Podiatry;  Laterality: Right;    VASCULAR SURGERY      WRIST SURGERY Left     repair of injury      General Information       Row Name 11/29/23 1227          OT Time and Intention    Document Type therapy note (daily note)  -PG     Mode of Treatment individual therapy;occupational therapy  -PG               User Key  (r) = Recorded By, (t) = Taken By, (c) = Cosigned By      Initials Name Provider Type    PG Kodak Matos OT Occupational Therapist                     Mobility/ADL's       Row Name 11/29/23 1227          Bed Mobility    Supine-Sit Archer (Bed Mobility) minimum assist (75% patient effort)  -PG       Row Name 11/29/23 122           Sit-Stand Transfer    Sit-Stand [place]let (Transfers) 2 person assist;verbal cues;moderate assist (50% patient effort)  -PG     Assistive Device (Sit-Stand Transfers) bariatric;walker, front-wheeled  -PG       Row Name 11/29/23 1227          Stand-Sit Transfer    Stand-Sit Inlet (Transfers) moderate assist (50% patient effort);2 person assist  -PG     Assistive Device (Stand-Sit Transfers) bariatric;walker, front-wheeled  -PG               User Key  (r) = Recorded By, (t) = Taken By, (c) = Cosigned By      Initials Name Provider Type    PG Kodak Matos OT Occupational Therapist                   Obj/Interventions       Row Name 11/29/23 1228          Shoulder (Therapeutic Exercise)    Shoulder (Therapeutic Exercise) strengthening exercise  -PG     Shoulder Strengthening (Therapeutic Exercise) 5 lb free weight;15 repititions  -PG       Row Name 11/29/23 1228          Elbow/Forearm (Therapeutic Exercise)    Elbow/Forearm (Therapeutic Exercise) strengthening exercise  -PG     Elbow/Forearm Strengthening (Therapeutic Exercise) 5 lb free weight;15 repititions  -PG               User Key  (r) = Recorded By, (t) = Taken By, (c) = Cosigned By      Initials Name Provider Type    PG Kodak Matos OT Occupational Therapist                   Goals/Plan    No documentation.                  Clinical Impression       Row Name 11/29/23 1229          Plan of Care Review    Progress no change  -PG               User Key  (r) = Recorded By, (t) = Taken By, (c) = Cosigned By      Initials Name Provider Type    Kodak Velazco OT Occupational Therapist                   Outcome Measures       Row Name 11/29/23 1229          How much help from another is currently needed...    Putting on and taking off regular lower body clothing? 1  -PG     Bathing (including washing, rinsing, and drying) 2  -PG     Toileting (which includes using toilet bed pan or urinal) 1  -PG     Putting on and taking off regular upper body clothing  4  -PG     Taking care of personal grooming (such as brushing teeth) 4  -PG     Eating meals 4  -PG     AM-PAC 6 Clicks Score (OT) 16  -PG       Row Name 11/29/23 1229          Functional Assessment    Outcome Measure Options AM-PAC 6 Clicks Daily Activity (OT);Optimal Instrument  -PG       Row Name 11/29/23 1229          Optimal Instrument    Optimal Instrument Optimal - 3  -PG     Bending/Stooping 5  -PG     Standing 4  -PG     Reaching 4  -PG               User Key  (r) = Recorded By, (t) = Taken By, (c) = Cosigned By      Initials Name Provider Type    PG Kodak Matos OT Occupational Therapist                    Occupational Therapy Education       Title: PT OT SLP Therapies (Done)       Topic: Occupational Therapy (Done)       Point: ADL training (Done)       Description:   Instruct learner(s) on proper safety adaptation and remediation techniques during self care or transfers.   Instruct in proper use of assistive devices.                  Learning Progress Summary             Patient Acceptance, E,D, DU by PG at 11/7/2023 0932                         Point: Home exercise program (Done)       Description:   Instruct learner(s) on appropriate technique for monitoring, assisting and/or progressing therapeutic exercises/activities.                  Learning Progress Summary             Patient Acceptance, E,D, DU by PG at 11/7/2023 0932                         Point: Precautions (Done)       Description:   Instruct learner(s) on prescribed precautions during self-care and functional transfers.                  Learning Progress Summary             Patient Acceptance, E,D, DU by PG at 11/7/2023 0932                         Point: Body mechanics (Done)       Description:   Instruct learner(s) on proper positioning and spine alignment during self-care, functional mobility activities and/or exercises.                  Learning Progress Summary             Patient Acceptance, E,D, DU by PG at 11/7/2023 0932                                          User Key       Initials Effective Dates Name Provider Type Discipline    PG 06/16/21 -  Kodak Matos OT Occupational Therapist OT                  OT Recommendation and Plan  Planned Therapy Interventions (OT): activity tolerance training, BADL retraining, strengthening exercise, transfer/mobility retraining, patient/caregiver education/training, occupation/activity based interventions  Therapy Frequency (OT): 5 times/wk  Plan of Care Review  Plan of Care Reviewed With: patient  Progress: no change  Outcome Evaluation: No significant change noted.  Patient continues to participate in upper body strengthening exercises and sit to stand transfers with moderate to maximal assist x 2 required.  Patient stood for approximately 1 minute today.  Patient then attempted to walk with the bed being pushed behind him.  Patient however unable to stand at this point.  Continue OT services per plan of care     Time Calculation:   Evaluation Complexity (OT)  Review Occupational Profile/Medical/Therapy History Complexity: brief/low complexity  Assessment, Occupational Performance/Identification of Deficit Complexity: 3-5 performance deficits  Clinical Decision Making Complexity (OT): problem focused assessment/low complexity  Overall Complexity of Evaluation (OT): low complexity     Time Calculation- OT       Row Name 11/29/23 1230             Time Calculation- OT    OT Received On 11/29/23  -PG      OT Goal Re-Cert Due Date 12/27/23  -PG         Timed Charges    90476 - OT Therapeutic Exercise Minutes 10  -PG      53382 - OT Therapeutic Activity Minutes 20  -PG         Total Minutes    Timed Charges Total Minutes 30  -PG       Total Minutes 30  -PG                User Key  (r) = Recorded By, (t) = Taken By, (c) = Cosigned By      Initials Name Provider Type    PG Kodak Matos OT Occupational Therapist                  Therapy Charges for Today       Code Description Service Date Service Provider  Modifiers Qty    41250547729 HC OT THERAPEUTIC ACT EA 15 MIN 11/28/2023 Kodak Matos, OT GO 2    37341517991 HC OT THER PROC EA 15 MIN 11/29/2023 Kodak Matos, OT GO 1    63968824866 HC OT THERAPEUTIC ACT EA 15 MIN 11/29/2023 Kodak Matos, OT GO 1                 Kodak Matos OT  11/29/2023

## 2023-11-29 NOTE — PLAN OF CARE
Goal Outcome Evaluation:              Outcome Evaluation: Plan of care continues at this time, patient has worked with therapy today in the bed and stood with therapy at side of bed with immobilzer brace to L leg. Patient has had episode of stool incontinence, also uses bedpan and urinal. Patient is alert and oriented, is able to make needs known. See eMAR for medication administration details. Blood sugars monitored. Call light in reach.

## 2023-11-29 NOTE — PLAN OF CARE
Goal Outcome Evaluation:  Plan of Care Reviewed With: patient           Outcome Evaluation: No significant changes this shift. He was medicated x 1 for pain and discomfort. Blood sugar was 159 and insulin was given.  Will continue with his plan of care. Call light and personal items within reach.

## 2023-11-30 LAB
ANION GAP SERPL CALCULATED.3IONS-SCNC: 6.5 MMOL/L (ref 5–15)
BASOPHILS # BLD AUTO: 0.04 10*3/MM3 (ref 0–0.2)
BASOPHILS NFR BLD AUTO: 1 % (ref 0–1.5)
BUN SERPL-MCNC: 18 MG/DL (ref 8–23)
BUN/CREAT SERPL: 36 (ref 7–25)
CALCIUM SPEC-SCNC: 8.3 MG/DL (ref 8.6–10.5)
CHLORIDE SERPL-SCNC: 105 MMOL/L (ref 98–107)
CO2 SERPL-SCNC: 24.5 MMOL/L (ref 22–29)
CREAT SERPL-MCNC: 0.5 MG/DL (ref 0.76–1.27)
DEPRECATED RDW RBC AUTO: 51.9 FL (ref 37–54)
EGFRCR SERPLBLD CKD-EPI 2021: 113.2 ML/MIN/1.73
EOSINOPHIL # BLD AUTO: 0.07 10*3/MM3 (ref 0–0.4)
EOSINOPHIL NFR BLD AUTO: 1.7 % (ref 0.3–6.2)
ERYTHROCYTE [DISTWIDTH] IN BLOOD BY AUTOMATED COUNT: 19.5 % (ref 12.3–15.4)
GLUCOSE BLDC GLUCOMTR-MCNC: 117 MG/DL (ref 70–99)
GLUCOSE BLDC GLUCOMTR-MCNC: 131 MG/DL (ref 70–99)
GLUCOSE BLDC GLUCOMTR-MCNC: 159 MG/DL (ref 70–99)
GLUCOSE BLDC GLUCOMTR-MCNC: 171 MG/DL (ref 70–99)
GLUCOSE SERPL-MCNC: 126 MG/DL (ref 65–99)
HCT VFR BLD AUTO: 32.8 % (ref 37.5–51)
HGB BLD-MCNC: 9.5 G/DL (ref 13–17.7)
IMM GRANULOCYTES # BLD AUTO: 0.02 10*3/MM3 (ref 0–0.05)
IMM GRANULOCYTES NFR BLD AUTO: 0.5 % (ref 0–0.5)
LYMPHOCYTES # BLD AUTO: 1.06 10*3/MM3 (ref 0.7–3.1)
LYMPHOCYTES NFR BLD AUTO: 25.8 % (ref 19.6–45.3)
MAGNESIUM SERPL-MCNC: 1.9 MG/DL (ref 1.6–2.4)
MCH RBC QN AUTO: 22.1 PG (ref 26.6–33)
MCHC RBC AUTO-ENTMCNC: 29 G/DL (ref 31.5–35.7)
MCV RBC AUTO: 76.5 FL (ref 79–97)
MONOCYTES # BLD AUTO: 0.6 10*3/MM3 (ref 0.1–0.9)
MONOCYTES NFR BLD AUTO: 14.6 % (ref 5–12)
NEUTROPHILS NFR BLD AUTO: 2.32 10*3/MM3 (ref 1.7–7)
NEUTROPHILS NFR BLD AUTO: 56.4 % (ref 42.7–76)
NRBC BLD AUTO-RTO: 0 /100 WBC (ref 0–0.2)
PHOSPHATE SERPL-MCNC: 3.6 MG/DL (ref 2.5–4.5)
PLATELET # BLD AUTO: 88 10*3/MM3 (ref 140–450)
PMV BLD AUTO: ABNORMAL FL
POTASSIUM SERPL-SCNC: 4.1 MMOL/L (ref 3.5–5.2)
RBC # BLD AUTO: 4.29 10*6/MM3 (ref 4.14–5.8)
SODIUM SERPL-SCNC: 136 MMOL/L (ref 136–145)
WBC NRBC COR # BLD AUTO: 4.11 10*3/MM3 (ref 3.4–10.8)

## 2023-11-30 PROCEDURE — 97530 THERAPEUTIC ACTIVITIES: CPT

## 2023-11-30 PROCEDURE — 97110 THERAPEUTIC EXERCISES: CPT

## 2023-11-30 PROCEDURE — 85025 COMPLETE CBC W/AUTO DIFF WBC: CPT | Performed by: PHYSICIAN ASSISTANT

## 2023-11-30 PROCEDURE — 99309 SBSQ NF CARE MODERATE MDM 30: CPT | Performed by: PHYSICIAN ASSISTANT

## 2023-11-30 PROCEDURE — 63710000001 INSULIN DETEMIR PER 5 UNITS: Performed by: PHYSICIAN ASSISTANT

## 2023-11-30 PROCEDURE — 63710000001 INSULIN LISPRO (HUMAN) PER 5 UNITS: Performed by: INTERNAL MEDICINE

## 2023-11-30 PROCEDURE — 83735 ASSAY OF MAGNESIUM: CPT | Performed by: PHYSICIAN ASSISTANT

## 2023-11-30 PROCEDURE — 84100 ASSAY OF PHOSPHORUS: CPT | Performed by: PHYSICIAN ASSISTANT

## 2023-11-30 PROCEDURE — 82948 REAGENT STRIP/BLOOD GLUCOSE: CPT

## 2023-11-30 PROCEDURE — 80048 BASIC METABOLIC PNL TOTAL CA: CPT | Performed by: PHYSICIAN ASSISTANT

## 2023-11-30 RX ORDER — CHOLECALCIFEROL (VITAMIN D3) 125 MCG
10 CAPSULE ORAL NIGHTLY PRN
Status: DISCONTINUED | OUTPATIENT
Start: 2023-11-30 | End: 2023-12-08

## 2023-11-30 RX ADMIN — PREGABALIN 100 MG: 100 CAPSULE ORAL at 21:10

## 2023-11-30 RX ADMIN — OXYCODONE AND ACETAMINOPHEN 1 TABLET: 7.5; 325 TABLET ORAL at 09:44

## 2023-11-30 RX ADMIN — Medication 250 MG: at 21:11

## 2023-11-30 RX ADMIN — OXYCODONE AND ACETAMINOPHEN 1 TABLET: 7.5; 325 TABLET ORAL at 16:26

## 2023-11-30 RX ADMIN — ATORVASTATIN CALCIUM 10 MG: 10 TABLET, FILM COATED ORAL at 21:10

## 2023-11-30 RX ADMIN — FERROUS SULFATE TAB 325 MG (65 MG ELEMENTAL FE) 325 MG: 325 (65 FE) TAB at 09:32

## 2023-11-30 RX ADMIN — HYDROCORTISONE 1 APPLICATION: 1 OINTMENT TOPICAL at 16:09

## 2023-11-30 RX ADMIN — EMPAGLIFLOZIN 10 MG: 10 TABLET, FILM COATED ORAL at 09:33

## 2023-11-30 RX ADMIN — OXYCODONE AND ACETAMINOPHEN 1 TABLET: 7.5; 325 TABLET ORAL at 22:49

## 2023-11-30 RX ADMIN — PREGABALIN 100 MG: 100 CAPSULE ORAL at 09:33

## 2023-11-30 RX ADMIN — INSULIN DETEMIR 60 UNITS: 100 INJECTION, SOLUTION SUBCUTANEOUS at 21:09

## 2023-11-30 RX ADMIN — PREGABALIN 100 MG: 100 CAPSULE ORAL at 16:27

## 2023-11-30 RX ADMIN — Medication 250 MG: at 09:33

## 2023-11-30 RX ADMIN — INSULIN LISPRO 4 UNITS: 100 INJECTION, SOLUTION INTRAVENOUS; SUBCUTANEOUS at 12:08

## 2023-11-30 RX ADMIN — APIXABAN 5 MG: 5 TABLET, FILM COATED ORAL at 21:10

## 2023-11-30 RX ADMIN — Medication 10 MG: at 02:18

## 2023-11-30 RX ADMIN — OXYCODONE AND ACETAMINOPHEN 1 TABLET: 7.5; 325 TABLET ORAL at 03:27

## 2023-11-30 RX ADMIN — BACLOFEN 10 MG: 10 TABLET ORAL at 02:18

## 2023-11-30 RX ADMIN — APIXABAN 5 MG: 5 TABLET, FILM COATED ORAL at 09:32

## 2023-11-30 RX ADMIN — LISINOPRIL 2.5 MG: 2.5 TABLET ORAL at 09:32

## 2023-11-30 RX ADMIN — CYANOCOBALAMIN TAB 500 MCG 1000 MCG: 500 TAB at 09:32

## 2023-11-30 RX ADMIN — INSULIN DETEMIR 70 UNITS: 100 INJECTION, SOLUTION SUBCUTANEOUS at 09:33

## 2023-11-30 RX ADMIN — INSULIN LISPRO 4 UNITS: 100 INJECTION, SOLUTION INTRAVENOUS; SUBCUTANEOUS at 17:50

## 2023-11-30 RX ADMIN — HYDROCORTISONE 1 APPLICATION: 1 OINTMENT TOPICAL at 21:12

## 2023-11-30 RX ADMIN — HYDROCORTISONE 1 APPLICATION: 1 OINTMENT TOPICAL at 09:34

## 2023-11-30 NOTE — THERAPY TREATMENT NOTE
SNF - Physical Therapy Treatment Note  KEO Dominguez     Patient Name: Jose Shaikh  : 1960  MRN: [id number]  Today's Date: 2023      Visit Dx:    ICD-10-CM ICD-9-CM   1. Difficulty in walking  R26.2 719.7     Patient Active Problem List   Diagnosis    Diabetic ulcer of left heel associated with type 2 DM    Acute osteomyelitis of left calcaneus     Diabetic ulcer of left heel associated with type 2 DM    Diabetic ulcer of right midfoot associated with type 2 DM    Paroxysmal atrial fibrillation    Essential hypertension    Hyperlipidemia LDL goal <100    Cellulitis and abscess of foot    High alkaline phosphatase level    Osteomyelitis    Onychomycosis    Onychocryptosis    Foot pain, bilateral    Osteomyelitis of foot, right, acute    Cellulitis of right foot    Type 2 diabetes mellitus, with long-term current use of insulin    Class 3 severe obesity due to excess calories with serious comorbidity and body mass index (BMI) of 45.0 to 49.9 in adult    Anxiety disorder, unspecified    Claustrophobia    Dependence on wheelchair    Depression, unspecified    Long term (current) use of anticoagulants    Long term (current) use of oral hypoglycemic drugs    Wound of foot    Non-prs chronic ulcer oth prt r foot limited to brkdwn skin    Orthostatic hypotension    Other chronic osteomyelitis, right ankle and foot    Personal history of nicotine dependence    Thrombocytopenia, unspecified    Unspecified open wound, right foot, initial encounter    Diabetic foot infection    Subacute osteomyelitis of right foot    Right foot pain    Sepsis    Onychomycosis    Foot pain, left    Impaired mobility and ADLs    Absence of toe of right foot    Corns and callosity    Disability of walking    Fracture    Limb swelling    Polyneuropathy    Pressure ulcer, stage 1    Shortness of breath    Generalized weakness    Debility     Past Medical History:   Diagnosis Date    Absence of toe of right foot     Acute osteomyelitis  of left calcaneus  08/18/2021    Anxiety and depression     Arthritis     Cancer     Claustrophobia     Corns and callus     Diabetic ulcer of left heel associated with type 2 DM 08/18/2021    Diabetic ulcer of left heel associated with type 2 DM 07/06/2021    Diabetic ulcer of right midfoot associated with type 2 DM 08/18/2021    Difficulty walking     Essential hypertension 08/31/2021    Hammertoe     Hyperlipidemia LDL goal <100 08/31/2021    Ingrown toenail     Obesity     Paroxysmal atrial fibrillation 08/31/2021    Polyneuropathy     Pressure ulcer, stage 1     Tinea unguium     Type 2 diabetes mellitus with polyneuropathy      Past Surgical History:   Procedure Laterality Date    CYST REMOVAL      center of back; benign    EYE SURGERY      INCISION AND DRAINAGE ABSCESS      back    INCISION AND DRAINAGE LEG Right 12/10/2021    Procedure: INCISION AND DRAINAGE LOWER EXTREMITY;  Surgeon: Ash Leyva DPM;  Location: [facility]  Service: Podiatry;  Laterality: Right;    OTHER SURGICAL HISTORY      Surgical clips left foot    TOE SURGERY Right     Removal of 5th toe    TRANS METATARSAL AMPUTATION Right 12/02/2021    Procedure: AMPUTATION TRANS METATARSAL;  Surgeon: Ash Leyva DPM;  Location: [facility]  Service: Podiatry;  Laterality: Right;    VASCULAR SURGERY      WRIST SURGERY Left     repair of injury       PT Assessment (last 12 hours)       PT Evaluation and Treatment       Row Name 11/30/23 1249          Physical Therapy Time and Intention    Document Type therapy note (daily note)  -WM     Mode of Treatment individual therapy;physical therapy  -WM     Patient Effort adequate  -WM     Symptoms Noted During/After Treatment fatigue;increased pain  -WM       Row Name 11/30/23 2774          Pain Scale: FACES Pre/Post-Treatment    Pain: FACES Scale, Pretreatment 2-->hurts little bit  -WM     Posttreatment Pain Rating 6-->hurts even more  -WM     Pain Location - Side/Orientation  Left  -     Pain Location generalized  -     Pain Location - hip;knee  -       Row Name 11/30/23 1247          Bed Mobility    Supine-Sit [place] (Bed Mobility) minimum assist (75% patient effort);verbal cues  -     Sit-Supine [place] (Bed Mobility) moderate assist (50% patient effort);verbal cues;1 person assist;1 person to manage equipment  -     Bed Mobility, Safety Issues decreased use of legs for bridging/pushing  -     Assistive Device (Bed Mobility) bed rails;head of bed elevated;overhead trapeze  -       Row Name 11/30/23 1247          Sit-Stand Transfer    Sit-Stand [place] (Transfers) moderate assist (50% patient effort);verbal cues;2 person assist  -     Assistive Device (Sit-Stand Transfers) bariatric;walker, rolling platform  -       Row Name 11/30/23 1247          Stand-Sit Transfer    Stand-Sit [place] (Transfers) moderate assist (50% patient effort);verbal cues;2 person assist  -     Assistive Device (Stand-Sit Transfers) bariatric;walker, front-wheeled  -       Row Name 11/30/23 1247          Safety Issues, Functional Mobility    Impairments Affecting Function (Mobility) balance;endurance/activity tolerance;pain;strength  -       Row Name 11/30/23 1247          Hip (Therapeutic Exercise)    Hip AAROM (Therapeutic Exercise) bilateral;aBduction;aDduction;supine;10 repetitions;2 sets  -     Hip Isometrics (Therapeutic Exercise) bilateral;gluteal sets;supine;10 repetitions;3 second hold;2 sets  -       Row Name 11/30/23 1247          Knee (Therapeutic Exercise)    Knee Isometrics (Therapeutic Exercise) bilateral;quad sets;supine;10 repetitions;3 second hold;2 sets  -     Knee Strengthening (Therapeutic Exercise) bilateral;SLR (straight leg raise);SAQ (short arc quad);supine;3 lb free weight;10 repetitions;2 sets  2 lb wt used with SAQ  -       Row Name 11/30/23 1247          Ankle (Therapeutic Exercise)    Ankle AROM (Therapeutic Exercise)  bilateral;dorsiflexion;plantarflexion;supine;20 repititions  -WM       Row Name             Wound 11/01/23 1312 Left anterior second toe Blisters    Wound - Properties Group Placement Date: 11/01/23  -RASHARD Placement Time: 1312  -RASHARD Present on Original Admission: N  -RASHARD Side: Left  -RASHARD Orientation: anterior  -RASHARD Location: second toe  -RASHARD Primary Wound Type: Blisters  -RASHARD    Retired Wound - Properties Group Placement Date: 11/01/23  -RASHARD Placement Time: 1312  -RASHARD Present on Original Admission: N  -RASHARD Side: Left  -RASHARD Orientation: anterior  -RASHARD Location: second toe  -RASHARD Primary Wound Type: Blisters  -RASHARD    Retired Wound - Properties Group Date first assessed: 11/01/23  -RASHARD Time first assessed: 1312  -RASHARD Present on Original Admission: N  -RASHARD Side: Left  -RASHARD Location: second toe  -RASHARD Primary Wound Type: Blisters  -RASHARD      Row Name             Wound 11/07/23 1240 Right anterior foot    Wound - Properties Group Placement Date: 11/07/23  - Placement Time: 1240  -FH Side: Right  -FH Orientation: anterior  -FH Location: foot  -FH    Retired Wound - Properties Group Placement Date: 11/07/23  - Placement Time: 1240  -FH Side: Right  -FH Orientation: anterior  -FH Location: foot  -FH    Retired Wound - Properties Group Date first assessed: 11/07/23  - Time first assessed: 1240  -FH Side: Right  -FH Location: foot  -FH      Row Name             Wound 11/07/23 1238 Left lower arm    Wound - Properties Group Placement Date: 11/07/23  - Placement Time: 1238  -FH Side: Left  -FH Orientation: lower  -FH Location: arm  -FH    Retired Wound - Properties Group Placement Date: 11/07/23  - Placement Time: 1238  -FH Side: Left  -FH Orientation: lower  -FH Location: arm  -FH    Retired Wound - Properties Group Date first assessed: 11/07/23  - Time first assessed: 1238  -FH Side: Left  -FH Location: arm  -FH      Row Name 11/30/23 1247          Positioning and Restraints    Pre-Treatment Position in bed  -     Post Treatment  Position bed  -WM     In Bed supine;call light within reach;encouraged to call for assist;exit alarm on  -WM       Row Name 11/30/23 1247          Progress Summary (PT)    Progress Toward Functional Goals (PT) progress toward functional goals is fair  -WM     Daily Progress Summary (PT) Pt required less assistance with supine to sitting transfer. Pt stood x 3 for 10-15 seconds.  -WM               User Key  (r) = Recorded By, (t) = Taken By, (c) = Cosigned By      Initials Name Provider Type    RASHARD Karon Bolton, RN Registered Nurse     Carlos Cagle RN Registered Nurse    Carson Johnson PTA Physical Therapist Assistant                  Section G              Section GG                       Physical Therapy Education       Title: PT OT SLP Therapies (Done)       Topic: Physical Therapy (Done)       Point: Mobility training (Done)       Learning Progress Summary             Patient Acceptance, E,TB, VU by  at 11/30/2023 0420    Acceptance, E,TB, VU by MOE at 11/8/2023 1341                         Point: Precautions (Done)       Learning Progress Summary             Patient Acceptance, E,TB, VU by  at 11/30/2023 0420    Acceptance, E,TB, VU by MOE at 11/8/2023 1341                                         User Key       Initials Effective Dates Name Provider Type Discipline     06/08/21 -  Bharath Berg, RN Registered Nurse Nurse    MOE 06/03/21 -  Merlin Rao, PT Physical Therapist PT                  PT Recommendation and Plan     Progress Summary (PT)  Progress Toward Functional Goals (PT): progress toward functional goals is fair  Daily Progress Summary (PT): Pt required less assistance with supine to sitting transfer. Pt stood x 3 for 10-15 seconds.   Outcome Measures       Row Name 11/30/23 1301 11/29/23 1500 11/28/23 1250       How much help from another person do you currently need...    Turning from your back to your side while in flat bed without using bedrails? 3  -WM 2  -RH 2   -WM    Moving from lying on back to sitting on the side of a flat bed without bedrails? 3  -WM 2  -RH 2  -WM    Moving to and from a bed to a chair (including a wheelchair)? 1  -WM 1  -RH 1  -WM    Standing up from a chair using your arms (e.g., wheelchair, bedside chair)? 1  -WM 1  -RH 1  -WM    Climbing 3-5 steps with a railing? 1  -WM 1  -RH 1  -WM    To walk in hospital room? 1  -WM 1  -RH 1  -WM    AM-PAC 6 Clicks Score (PT) 10  -WM 8  -RH 8  -WM    Highest Level of Mobility Goal 4 --> Transfer to chair/commode  -WM 3 --> Sit at edge of bed  -RH 3 --> Sit at edge of bed  -WM              User Key  (r) = Recorded By, (t) = Taken By, (c) = Cosigned By      Initials Name Provider Type     Bijan Todd PTA Physical Therapist Assistant     Carson Inman PTA Physical Therapist Assistant                     Time Calculation:    PT Charges       Row Name 11/30/23 1242 11/30/23 1239          Time Calculation    PT Received On -- 11/30/23  -        Timed Charges    39076 - PT Therapeutic Exercise Minutes --  -WM 15  -WM     33810 - Gait Training Minutes  -- --  -     44094 - PT Therapeutic Activity Minutes --  -WM 24  -WM        SNF Physical Therapy Minutes    Skilled Minutes- PT -- 39 min  -WM        Total Minutes    Timed Charges Total Minutes --  -WM 39  -WM      Total Minutes --  -WM 39  -WM               User Key  (r) = Recorded By, (t) = Taken By, (c) = Cosigned By      Initials Name Provider Type     Carson Inman PTA Physical Therapist Assistant                  Therapy Charges for Today       Code Description Service Date Service Provider Modifiers Qty    18894260564 HC PT THER PROC EA 15 MIN 11/30/2023 Carson Inman PTA GP 1    83144470241 HC PT THERAPEUTIC ACT EA 15 MIN 11/30/2023 Carson Inman PTA GP 2            PT G-Codes  Outcome Measure Options: AM-PAC 6 Clicks Daily Activity (OT)  AM-PAC 6 Clicks Score (PT): 10  AM-PAC 6 Clicks Score (OT): 11    Carson Inman PTA  11/30/2023

## 2023-11-30 NOTE — THERAPY TREATMENT NOTE
Patient Name: Jose Shaikh  : 1960    MRN: [id number]                              Today's Date: 2023       Admit Date: 2023    Visit Dx:     ICD-10-CM ICD-9-CM   1. Difficulty in walking  R26.2 719.7     Patient Active Problem List   Diagnosis    Diabetic ulcer of left heel associated with type 2 DM    Acute osteomyelitis of left calcaneus     Diabetic ulcer of left heel associated with type 2 DM    Diabetic ulcer of right midfoot associated with type 2 DM    Paroxysmal atrial fibrillation    Essential hypertension    Hyperlipidemia LDL goal <100    Cellulitis and abscess of foot    High alkaline phosphatase level    Osteomyelitis    Onychomycosis    Onychocryptosis    Foot pain, bilateral    Osteomyelitis of foot, right, acute    Cellulitis of right foot    Type 2 diabetes mellitus, with long-term current use of insulin    Class 3 severe obesity due to excess calories with serious comorbidity and body mass index (BMI) of 45.0 to 49.9 in adult    Anxiety disorder, unspecified    Claustrophobia    Dependence on wheelchair    Depression, unspecified    Long term (current) use of anticoagulants    Long term (current) use of oral hypoglycemic drugs    Wound of foot    Non-prs chronic ulcer oth prt r foot limited to brkdwn skin    Orthostatic hypotension    Other chronic osteomyelitis, right ankle and foot    Personal history of nicotine dependence    Thrombocytopenia, unspecified    Unspecified open wound, right foot, initial encounter    Diabetic foot infection    Subacute osteomyelitis of right foot    Right foot pain    Sepsis    Onychomycosis    Foot pain, left    Impaired mobility and ADLs    Absence of toe of right foot    Corns and callosity    Disability of walking    Fracture    Limb swelling    Polyneuropathy    Pressure ulcer, stage 1    Shortness of breath    Generalized weakness    Debility     Past Medical History:   Diagnosis Date    Absence of toe of right foot     Acute  osteomyelitis of left calcaneus  08/18/2021    Anxiety and depression     Arthritis     Cancer     Claustrophobia     Corns and callus     Diabetic ulcer of left heel associated with type 2 DM 08/18/2021    Diabetic ulcer of left heel associated with type 2 DM 07/06/2021    Diabetic ulcer of right midfoot associated with type 2 DM 08/18/2021    Difficulty walking     Essential hypertension 08/31/2021    Hammertoe     Hyperlipidemia LDL goal <100 08/31/2021    Ingrown toenail     Obesity     Paroxysmal atrial fibrillation 08/31/2021    Polyneuropathy     Pressure ulcer, stage 1     Tinea unguium     Type 2 diabetes mellitus with polyneuropathy      Past Surgical History:   Procedure Laterality Date    CYST REMOVAL      center of back; benign    EYE SURGERY      INCISION AND DRAINAGE ABSCESS      back    INCISION AND DRAINAGE LEG Right 12/10/2021    Procedure: INCISION AND DRAINAGE LOWER EXTREMITY;  Surgeon: Ash Leyva DPM;  Location: [facility];  Service: Podiatry;  Laterality: Right;    OTHER SURGICAL HISTORY      Surgical clips left foot    TOE SURGERY Right     Removal of 5th toe    TRANS METATARSAL AMPUTATION Right 12/02/2021    Procedure: AMPUTATION TRANS METATARSAL;  Surgeon: Ash Leyva DPM;  Location: [facility] OR;  Service: Podiatry;  Laterality: Right;    VASCULAR SURGERY      WRIST SURGERY Left     repair of injury      General Information       Row Name 11/30/23 1300          OT Time and Intention    Document Type therapy note (daily note)  -PG     Mode of Treatment individual therapy;occupational therapy  -PG               User Key  (r) = Recorded By, (t) = Taken By, (c) = Cosigned By      Initials Name Provider Type    PG Kodak Matos OT Occupational Therapist                     Mobility/ADL's       Row Name 11/30/23 1300          Sit-Stand Transfer    Sit-Stand [place] (Transfers) 2 person assist;verbal cues;moderate assist (50% patient effort)  -PG     Assistive  Device (Sit-Stand Transfers) bariatric;walker, front-wheeled  -PG       Row Name 11/30/23 1300          Stand-Sit Transfer    Stand-Sit [place] (Transfers) moderate assist (50% patient effort);2 person assist  -PG               User Key  (r) = Recorded By, (t) = Taken By, (c) = Cosigned By      Initials Name Provider Type    PG Kodak Matos, OT Occupational Therapist                   Obj/Interventions    No documentation.                  Goals/Plan    No documentation.                  Clinical Impression    No documentation.                  Outcome Measures       Row Name 11/30/23 1300          How much help from another is currently needed...    Putting on and taking off regular lower body clothing? 1  -PG     Bathing (including washing, rinsing, and drying) 1  -PG     Toileting (which includes using toilet bed pan or urinal) 1  -PG     Putting on and taking off regular upper body clothing 2  -PG     Taking care of personal grooming (such as brushing teeth) 3  -PG     Eating meals 3  -PG     AM-PAC 6 Clicks Score (OT) 11  -PG       Row Name 11/30/23 1301 11/30/23 0930       How much help from another person do you currently need...    Turning from your back to your side while in flat bed without using bedrails? 3  -WM 4  -TW    Moving from lying on back to sitting on the side of a flat bed without bedrails? 3  -WM 3  -TW    Moving to and from a bed to a chair (including a wheelchair)? 1  -WM 1  -TW    Standing up from a chair using your arms (e.g., wheelchair, bedside chair)? 1  -WM 1  -TW    Climbing 3-5 steps with a railing? 1  -WM 1  -TW    To walk in hospital room? 1  -WM 1  -TW    AM-PAC 6 Clicks Score (PT) 10  -WM 11  -TW    Highest Level of Mobility Goal 4 --> Transfer to chair/commode  -WM 4 --> Transfer to chair/commode  -TW      Row Name 11/30/23 1300          Functional Assessment    Outcome Measure Options AM-PAC 6 Clicks Daily Activity (OT)  -PG       Row Name 11/30/23 1300          Optimal  Instrument    Optimal Instrument Optimal - 3  -PG     Bending/Stooping 5  -PG     Standing 4  -PG     Reaching 1  -PG               User Key  (r) = Recorded By, (t) = Taken By, (c) = Cosigned By      Initials Name Provider Type    Carson Johnson PTA Physical Therapist Assistant    PG Kodak Matos, OT Occupational Therapist    Joyce Hickman, RN Registered Nurse                    Occupational Therapy Education       Title: PT OT SLP Therapies (Done)       Topic: Occupational Therapy (Done)       Point: ADL training (Done)       Description:   Instruct learner(s) on proper safety adaptation and remediation techniques during self care or transfers.   Instruct in proper use of assistive devices.                  Learning Progress Summary             Patient Acceptance, E,TB, VU by RM at 11/30/2023 0420    Acceptance, E,D, DU by PG at 11/7/2023 0932                         Point: Home exercise program (Done)       Description:   Instruct learner(s) on appropriate technique for monitoring, assisting and/or progressing therapeutic exercises/activities.                  Learning Progress Summary             Patient Acceptance, E,TB, VU by RM at 11/30/2023 0420    Acceptance, E,D, DU by PG at 11/7/2023 0932                         Point: Precautions (Done)       Description:   Instruct learner(s) on prescribed precautions during self-care and functional transfers.                  Learning Progress Summary             Patient Acceptance, E,TB, VU by RM at 11/30/2023 0420    Acceptance, E,D, DU by PG at 11/7/2023 0932                         Point: Body mechanics (Done)       Description:   Instruct learner(s) on proper positioning and spine alignment during self-care, functional mobility activities and/or exercises.                  Learning Progress Summary             Patient Acceptance, E,TB, VU by RM at 11/30/2023 0420    Acceptance, E,D, DU by PG at 11/7/2023 0932                                         User  Key       Initials Effective Dates Name Provider Type Discipline     06/08/21 -  Bharath Berg, RN Registered Nurse Nurse    PG 06/16/21 -  Kodak Matos OT Occupational Therapist OT                  OT Recommendation and Plan  Planned Therapy Interventions (OT): activity tolerance training, BADL retraining, strengthening exercise, transfer/mobility retraining, patient/caregiver education/training, occupation/activity based interventions  Therapy Frequency (OT): 5 times/wk  Plan of Care Review  Plan of Care Reviewed With: patient  Progress: no change  Outcome Evaluation: No significant change noted.  Patient continues to participate in upper body strengthening exercises and sit to stand transfers with moderate to maximal assist x 2 required.  Patient stood for approximately 1 minute today.  Patient then attempted to walk with the bed being pushed behind him.  Patient however unable to stand at this point.  Continue OT services per plan of care     Time Calculation:   Evaluation Complexity (OT)  Review Occupational Profile/Medical/Therapy History Complexity: brief/low complexity  Assessment, Occupational Performance/Identification of Deficit Complexity: 3-5 performance deficits  Clinical Decision Making Complexity (OT): problem focused assessment/low complexity  Overall Complexity of Evaluation (OT): low complexity     Time Calculation- OT       Row Name 11/30/23 1302 11/30/23 1301 11/30/23 1239       Time Calculation- OT    OT Received On -- 11/30/23  -PG --    OT Goal Re-Cert Due Date -- 11/27/23  -PG --       Timed Charges    78849 - Gait Training Minutes  -- -- --  -WM    44536 - OT Therapeutic Activity Minutes -- 25  -PG --       SNF Occupational Therapy Minutes    Skilled Minutes- OT 25 min  -PG -- --       Total Minutes    Timed Charges Total Minutes -- 25  -PG --  -WM     Total Minutes -- 25  -PG --  -WM              User Key  (r) = Recorded By, (t) = Taken By, (c) = Cosigned By      Initials Name  Provider Type    Carson Johnson, [place] Physical Therapist Assistant    PG Kodak Matos OT Occupational Therapist                  Therapy Charges for Today       Code Description Service Date Service Provider Modifiers Qty    56563412363 HC OT THER PROC EA 15 MIN 11/29/2023 Kodak Matos OT GO 1    77370364716 HC OT THERAPEUTIC ACT EA 15 MIN 11/29/2023 Kodak Matos OT GO 1    01485182286 HC OT THERAPEUTIC ACT EA 15 MIN 11/30/2023 Kodak Matos OT GO 2                 Kodak Matos OT  11/30/2023

## 2023-11-30 NOTE — PROGRESS NOTES
[facility]   Hospitalist Progress Note  Date: 2023  Patient Name: Jose Shaikh  : 1960  MRN: [id number]  Date of admission: 2023      Subjective   Subjective     Chief Complaint: Weakness    Summary: Jose Shaikh is a 63 y.o. male  initially hospitalized on 9/10/2023, prolonged hospitalization for treatment of management of generalized weakness, deconditioning, with acute issues of diarrhea, C. difficile negative.  Diarrhea improved.  Had small hematoma after ambulating on his foot.  Unfortunately with exhaustive efforts to try to place this gentleman after 39 days of hospitalization, we are unable to find a facility that will accept him.  He has no further acute needs or requirements for inpatient monitoring and management, his labs and vitals are stable, he is tolerating oral intake, and has been refusing turns and repositionings and other interventions with nursing staff despite recommendations.  Patient discharged in hemodynamically stable condition on 10/19/2023 to follow-up with PCP within 1 week. Unfortunately we cannot solve all of his social issues during this hospitalization due to lack of resources and participation from the patient's perspective despite all our efforts.  Patient has a financial means of making arrangements at home.  Patient appealed his discharge.  Lost his appeal.  LCD: 10/20/23, PFL beginning: 10/21/23 @ 12pm.  Due to an inability to place the patient in a.m. nursing home he has been placed in our skilled nursing facility until arrangements can be made or until he is able to care for himself.      Interval Followup: 2023    C. difficile associated diarrhea has resolved   Denies any new complaints  Making slow progress with therapy  No fevers  Vital signs stable  Glucose controlled:  range  Creatinine 0.5  Hemoglobin 9.5      Review of systems: All systems reviewed negative septa following: Patient complains of generalized weakness fatigue and  diarrhea  Objective   Objective     Vitals:   Temp:  [97.5 °F (36.4 °C)-97.7 °F (36.5 °C)] 97.5 °F (36.4 °C)  Heart Rate:  [80] 80  Resp:  [18-19] 19  BP: (121-122)/(53-58) 122/53    Physical Exam   Constitutional: Awake alert oriented no acute distress  Respiratory: No wheeze  GI: Abdomen: Obese  Neuro/psych:  Calm mood.  No dysarthria.    Result Review    Result Review:  I have personally reviewed the results from the time of this admission to 11/30/2023 13:28 EST and agree with these findings:  []  Laboratory  []  Microbiology  []  Radiology  []  EKG/Telemetry   []  Cardiology/Vascular   []  Pathology  []  Old records  []  Other:    Assessment & Plan   Assessment / Plan   Assessment:  Hospital-acquired weakness  C. difficile diarrhea  Generalized weakness  Deconditioning  DM (Kami Garcia and PCP)  HTN  PAF (on Eliquis; previously seen Dr. Duval)  Morbid obesity with BMI 44  Debility with wheelchair dependence  Hx of severe left hip pain  Severe degenerative joint disease of lower extremities  Hx L calcaneus osteomyelitis  Hx R transmetatarsal  Chronic bilateral foot wounds with right transmetatarsal amputation, healing by secondary intention (wound care clinic; podiatrist Gordon)  Thrombocytopenia, resolved      Plan:    Continue Levemir 70 a.m., 60 p.m.   Continue sliding scale insulin protocol  Continue Eliquis for stroke prophylaxis  Continue daily PT and OT  Continue current pain meds   Completed course of oral vancomycin for C difficile associated diarrhea.    Continue probiotics  Monitor hemodynamics and avoid hypotension/dehydration.  Continue other treatment as ordered  Discussed with RN    DVT prophylaxis:  Medical DVT prophylaxis orders are present.    CODE STATUS:   Code Status (Patient has no pulse and is not breathing): CPR (Attempt to Resuscitate)  Medical Interventions (Patient has pulse or is breathing): Full Support

## 2023-12-01 LAB
GLUCOSE BLDC GLUCOMTR-MCNC: 105 MG/DL (ref 70–99)
GLUCOSE BLDC GLUCOMTR-MCNC: 154 MG/DL (ref 70–99)
GLUCOSE BLDC GLUCOMTR-MCNC: 179 MG/DL (ref 70–99)
GLUCOSE BLDC GLUCOMTR-MCNC: 185 MG/DL (ref 70–99)

## 2023-12-01 PROCEDURE — 63710000001 ONDANSETRON ODT 4 MG TABLET DISPERSIBLE: Performed by: INTERNAL MEDICINE

## 2023-12-01 PROCEDURE — 63710000001 INSULIN DETEMIR PER 5 UNITS: Performed by: PHYSICIAN ASSISTANT

## 2023-12-01 PROCEDURE — 63710000001 INSULIN LISPRO (HUMAN) PER 5 UNITS: Performed by: INTERNAL MEDICINE

## 2023-12-01 PROCEDURE — 82948 REAGENT STRIP/BLOOD GLUCOSE: CPT

## 2023-12-01 RX ADMIN — INSULIN LISPRO 4 UNITS: 100 INJECTION, SOLUTION INTRAVENOUS; SUBCUTANEOUS at 17:43

## 2023-12-01 RX ADMIN — OXYCODONE AND ACETAMINOPHEN 1 TABLET: 7.5; 325 TABLET ORAL at 10:06

## 2023-12-01 RX ADMIN — PREGABALIN 100 MG: 100 CAPSULE ORAL at 21:22

## 2023-12-01 RX ADMIN — HYDROCORTISONE 1 APPLICATION: 1 OINTMENT TOPICAL at 21:23

## 2023-12-01 RX ADMIN — ATORVASTATIN CALCIUM 10 MG: 10 TABLET, FILM COATED ORAL at 21:22

## 2023-12-01 RX ADMIN — HYDROCORTISONE 1 APPLICATION: 1 OINTMENT TOPICAL at 10:06

## 2023-12-01 RX ADMIN — ONDANSETRON 4 MG: 4 TABLET, ORALLY DISINTEGRATING ORAL at 13:08

## 2023-12-01 RX ADMIN — PREGABALIN 100 MG: 100 CAPSULE ORAL at 16:31

## 2023-12-01 RX ADMIN — LISINOPRIL 2.5 MG: 2.5 TABLET ORAL at 10:00

## 2023-12-01 RX ADMIN — PREGABALIN 100 MG: 100 CAPSULE ORAL at 10:00

## 2023-12-01 RX ADMIN — Medication 10 MG: at 21:22

## 2023-12-01 RX ADMIN — EMPAGLIFLOZIN 10 MG: 10 TABLET, FILM COATED ORAL at 10:00

## 2023-12-01 RX ADMIN — OXYCODONE AND ACETAMINOPHEN 1 TABLET: 7.5; 325 TABLET ORAL at 21:22

## 2023-12-01 RX ADMIN — CYANOCOBALAMIN TAB 500 MCG 1000 MCG: 500 TAB at 10:00

## 2023-12-01 RX ADMIN — INSULIN DETEMIR 70 UNITS: 100 INJECTION, SOLUTION SUBCUTANEOUS at 10:00

## 2023-12-01 RX ADMIN — Medication 250 MG: at 10:00

## 2023-12-01 RX ADMIN — Medication 250 MG: at 21:22

## 2023-12-01 RX ADMIN — INSULIN LISPRO 4 UNITS: 100 INJECTION, SOLUTION INTRAVENOUS; SUBCUTANEOUS at 21:21

## 2023-12-01 RX ADMIN — APIXABAN 5 MG: 5 TABLET, FILM COATED ORAL at 21:22

## 2023-12-01 RX ADMIN — FERROUS SULFATE TAB 325 MG (65 MG ELEMENTAL FE) 325 MG: 325 (65 FE) TAB at 10:00

## 2023-12-01 RX ADMIN — INSULIN LISPRO 4 UNITS: 100 INJECTION, SOLUTION INTRAVENOUS; SUBCUTANEOUS at 11:51

## 2023-12-01 RX ADMIN — INSULIN DETEMIR 60 UNITS: 100 INJECTION, SOLUTION SUBCUTANEOUS at 21:21

## 2023-12-01 RX ADMIN — APIXABAN 5 MG: 5 TABLET, FILM COATED ORAL at 10:00

## 2023-12-01 RX ADMIN — HYDROCORTISONE 1 APPLICATION: 1 OINTMENT TOPICAL at 16:31

## 2023-12-01 NOTE — PLAN OF CARE
Goal Outcome Evaluation:  Plan of Care Reviewed With: patient        Progress: no change  Outcome Evaluation: Alert and oriented x4; able to make needs known to staff. Assists with turning to be cleaned by using trapeze bar. Continues to have multiple BM's per shift with incontinence at times. Percocet administered for pain per MAR. Call light within reach; care plan ongoing.

## 2023-12-01 NOTE — PLAN OF CARE
Goal Outcome Evaluation:              Outcome Evaluation: Plan of care continues at this time. Patient did stand at side of bed with therapy 2x assist/gair belt and walker, while wearing brace to L LE and nurse and PCA held bed behind patient for safety. Patient is able to make needs known. Patient has had multiple frequent BMs this shift and this was reported to provider. Patient has used bedpan and some episodes of stool incontinence, incontinence care provided, skin cleansed and barrier ointment applied. Patient has declined repositioning at times, nurse did educate patient on importance of shifting weight to alleviate pressure. Dressing changed to R LE. Mepelix in place to L UE skin tear. Patient educated on carb consistent diet. Blood glucose monitored. Patient medicated for L leg pain-see eMAR for medication administration details. Call light in reach.

## 2023-12-01 NOTE — PLAN OF CARE
Goal Outcome Evaluation:  Plan of Care Reviewed With: patient        Progress: improving  Outcome Evaluation: Resident alert, oriented, able to make needs known to staff. remains in bed, only stands with therapy. loose stools and incontinence today, MD aware, not able to order prns for loose stolls d/t just being positive for C-diff. large amount of edema noted to bl thighs and abdomen. MD aware, no new orders. uses urinal independently at BS. medicated for prn nausea x1, effective. resident pleasant and cooperative.

## 2023-12-02 LAB
GLUCOSE BLDC GLUCOMTR-MCNC: 139 MG/DL (ref 70–99)
GLUCOSE BLDC GLUCOMTR-MCNC: 155 MG/DL (ref 70–99)
GLUCOSE BLDC GLUCOMTR-MCNC: 164 MG/DL (ref 70–99)
GLUCOSE BLDC GLUCOMTR-MCNC: 187 MG/DL (ref 70–99)

## 2023-12-02 PROCEDURE — 63710000001 INSULIN DETEMIR PER 5 UNITS: Performed by: PHYSICIAN ASSISTANT

## 2023-12-02 PROCEDURE — 97110 THERAPEUTIC EXERCISES: CPT

## 2023-12-02 PROCEDURE — 63710000001 INSULIN LISPRO (HUMAN) PER 5 UNITS: Performed by: INTERNAL MEDICINE

## 2023-12-02 PROCEDURE — 82948 REAGENT STRIP/BLOOD GLUCOSE: CPT

## 2023-12-02 RX ORDER — FUROSEMIDE 40 MG/1
40 TABLET ORAL DAILY
Status: COMPLETED | OUTPATIENT
Start: 2023-12-02 | End: 2023-12-04

## 2023-12-02 RX ADMIN — INSULIN LISPRO 4 UNITS: 100 INJECTION, SOLUTION INTRAVENOUS; SUBCUTANEOUS at 21:28

## 2023-12-02 RX ADMIN — HYDROCORTISONE 1 APPLICATION: 1 OINTMENT TOPICAL at 16:29

## 2023-12-02 RX ADMIN — LISINOPRIL 2.5 MG: 2.5 TABLET ORAL at 08:38

## 2023-12-02 RX ADMIN — FERROUS SULFATE TAB 325 MG (65 MG ELEMENTAL FE) 325 MG: 325 (65 FE) TAB at 08:38

## 2023-12-02 RX ADMIN — HYDROCORTISONE 1 APPLICATION: 1 OINTMENT TOPICAL at 21:27

## 2023-12-02 RX ADMIN — INSULIN DETEMIR 60 UNITS: 100 INJECTION, SOLUTION SUBCUTANEOUS at 21:28

## 2023-12-02 RX ADMIN — CYANOCOBALAMIN TAB 500 MCG 1000 MCG: 500 TAB at 08:38

## 2023-12-02 RX ADMIN — INSULIN DETEMIR 70 UNITS: 100 INJECTION, SOLUTION SUBCUTANEOUS at 08:38

## 2023-12-02 RX ADMIN — PREGABALIN 100 MG: 100 CAPSULE ORAL at 08:39

## 2023-12-02 RX ADMIN — PREGABALIN 100 MG: 100 CAPSULE ORAL at 16:29

## 2023-12-02 RX ADMIN — ATORVASTATIN CALCIUM 10 MG: 10 TABLET, FILM COATED ORAL at 21:28

## 2023-12-02 RX ADMIN — INSULIN LISPRO 4 UNITS: 100 INJECTION, SOLUTION INTRAVENOUS; SUBCUTANEOUS at 12:08

## 2023-12-02 RX ADMIN — OXYCODONE AND ACETAMINOPHEN 1 TABLET: 7.5; 325 TABLET ORAL at 03:42

## 2023-12-02 RX ADMIN — INSULIN LISPRO 4 UNITS: 100 INJECTION, SOLUTION INTRAVENOUS; SUBCUTANEOUS at 17:42

## 2023-12-02 RX ADMIN — Medication 250 MG: at 08:38

## 2023-12-02 RX ADMIN — OXYCODONE AND ACETAMINOPHEN 1 TABLET: 7.5; 325 TABLET ORAL at 10:24

## 2023-12-02 RX ADMIN — Medication 250 MG: at 21:28

## 2023-12-02 RX ADMIN — APIXABAN 5 MG: 5 TABLET, FILM COATED ORAL at 21:28

## 2023-12-02 RX ADMIN — OXYCODONE AND ACETAMINOPHEN 1 TABLET: 7.5; 325 TABLET ORAL at 17:42

## 2023-12-02 RX ADMIN — EMPAGLIFLOZIN 10 MG: 10 TABLET, FILM COATED ORAL at 08:39

## 2023-12-02 RX ADMIN — APIXABAN 5 MG: 5 TABLET, FILM COATED ORAL at 08:38

## 2023-12-02 RX ADMIN — HYDROCORTISONE 1 APPLICATION: 1 OINTMENT TOPICAL at 08:39

## 2023-12-02 RX ADMIN — FUROSEMIDE 40 MG: 40 TABLET ORAL at 08:38

## 2023-12-02 RX ADMIN — PREGABALIN 100 MG: 100 CAPSULE ORAL at 21:28

## 2023-12-02 NOTE — THERAPY TREATMENT NOTE
SNF - Physical Therapy Progress Note  [facility]     Patient Name: Jose Shaikh  : 1960  MRN: [id number]  Today's Date: 2023      Visit Dx:    ICD-10-CM ICD-9-CM   1. Difficulty in walking  R26.2 719.7     Patient Active Problem List   Diagnosis    Diabetic ulcer of left heel associated with type 2 DM    Acute osteomyelitis of left calcaneus     Diabetic ulcer of left heel associated with type 2 DM    Diabetic ulcer of right midfoot associated with type 2 DM    Paroxysmal atrial fibrillation    Essential hypertension    Hyperlipidemia LDL goal <100    Cellulitis and abscess of foot    High alkaline phosphatase level    Osteomyelitis    Onychomycosis    Onychocryptosis    Foot pain, bilateral    Osteomyelitis of foot, right, acute    Cellulitis of right foot    Type 2 diabetes mellitus, with long-term current use of insulin    Class 3 severe obesity due to excess calories with serious comorbidity and body mass index (BMI) of 45.0 to 49.9 in adult    Anxiety disorder, unspecified    Claustrophobia    Dependence on wheelchair    Depression, unspecified    Long term (current) use of anticoagulants    Long term (current) use of oral hypoglycemic drugs    Wound of foot    Non-prs chronic ulcer oth prt r foot limited to brkdwn skin    Orthostatic hypotension    Other chronic osteomyelitis, right ankle and foot    Personal history of nicotine dependence    Thrombocytopenia, unspecified    Unspecified open wound, right foot, initial encounter    Diabetic foot infection    Subacute osteomyelitis of right foot    Right foot pain    Sepsis    Onychomycosis    Foot pain, left    Impaired mobility and ADLs    Absence of toe of right foot    Corns and callosity    Disability of walking    Fracture    Limb swelling    Polyneuropathy    Pressure ulcer, stage 1    Shortness of breath    Generalized weakness    Debility     Past Medical History:   Diagnosis Date    Absence of toe of right foot     Acute osteomyelitis of  left calcaneus  08/18/2021    Anxiety and depression     Arthritis     Cancer     Claustrophobia     Corns and callus     Diabetic ulcer of left heel associated with type 2 DM 08/18/2021    Diabetic ulcer of left heel associated with type 2 DM 07/06/2021    Diabetic ulcer of right midfoot associated with type 2 DM 08/18/2021    Difficulty walking     Essential hypertension 08/31/2021    Hammertoe     Hyperlipidemia LDL goal <100 08/31/2021    Ingrown toenail     Obesity     Paroxysmal atrial fibrillation 08/31/2021    Polyneuropathy     Pressure ulcer, stage 1     Tinea unguium     Type 2 diabetes mellitus with polyneuropathy      Past Surgical History:   Procedure Laterality Date    CYST REMOVAL      center of back; benign    EYE SURGERY      INCISION AND DRAINAGE ABSCESS      back    INCISION AND DRAINAGE LEG Right 12/10/2021    Procedure: INCISION AND DRAINAGE LOWER EXTREMITY;  Surgeon: Ash Leyva DPM;  Location: [facility];  Service: Podiatry;  Laterality: Right;    OTHER SURGICAL HISTORY      Surgical clips left foot    TOE SURGERY Right     Removal of 5th toe    TRANS METATARSAL AMPUTATION Right 12/02/2021    Procedure: AMPUTATION TRANS METATARSAL;  Surgeon: Ash Leyva DPM;  Location: [facility];  Service: Podiatry;  Laterality: Right;    VASCULAR SURGERY      WRIST SURGERY Left     repair of injury       PT Assessment (last 12 hours)       PT Evaluation and Treatment       Row Name 12/02/23 1200          Physical Therapy Time and Intention    Subjective Information complains of;weakness;pain  -CS     Document Type therapy note (daily note)  -CS     Mode of Treatment individual therapy;physical therapy  -CS     Patient Effort good  -CS     Symptoms Noted During/After Treatment fatigue  -CS       Row Name 12/02/23 1200          Pain    Pretreatment Pain Rating 6/10  -CS     Posttreatment Pain Rating 6/10  -CS     Pain Location - Side/Orientation Left  -CS     Pain Location -  hip;knee  -CS     Pain Intervention(s) Repositioned;Distraction  -CS       Row Name 12/02/23 1200          Hip (Therapeutic Exercise)    Hip AAROM (Therapeutic Exercise) bilateral;supine;10 repetitions;3 sets  heel slides, hip abd/add, hip IR/ER  -CS       Row Name 12/02/23 1200          Knee (Therapeutic Exercise)    Knee AAROM (Therapeutic Exercise) bilateral;supine;10 repetitions;3 sets  SLR's  -CS     Knee Strengthening (Therapeutic Exercise) bilateral;SAQ (short arc quad);supine;2 lb free weight;30 repititions  -CS       Row Name 12/02/23 1200          Ankle (Therapeutic Exercise)    Ankle AROM (Therapeutic Exercise) bilateral;dorsiflexion;plantarflexion;supine;30 repititions  -CS       Row Name             Wound 11/01/23 1312 Left anterior second toe Blisters    Wound - Properties Group Placement Date: 11/01/23  -RASHARD Placement Time: 1312  -RASHARD Present on Original Admission: N  -RASHARD Side: Left  -RASHARD Orientation: anterior  -RASHARD Location: second toe  -RASHARD Primary Wound Type: Blisters  -RASHARD    Retired Wound - Properties Group Placement Date: 11/01/23  -RASHARD Placement Time: 1312  -RASHARD Present on Original Admission: N  -RASHARD Side: Left  -RASHARD Orientation: anterior  -RASHARD Location: second toe  -RASHARD Primary Wound Type: Blisters  -RASHARD    Retired Wound - Properties Group Date first assessed: 11/01/23  -RASHARD Time first assessed: 1312  -RASHARD Present on Original Admission: N  -RASHARD Side: Left  -RASHARD Location: second toe  -RASHARD Primary Wound Type: Blisters  -RASHARD      Row Name             Wound 11/07/23 1240 Right anterior foot    Wound - Properties Group Placement Date: 11/07/23  -FH Placement Time: 1240  -FH Side: Right  -FH Orientation: anterior  -FH Location: foot  -FH    Retired Wound - Properties Group Placement Date: 11/07/23  -FH Placement Time: 1240  -FH Side: Right  -FH Orientation: anterior  -FH Location: foot  -FH    Retired Wound - Properties Group Date first assessed: 11/07/23  -FH Time first assessed: 1240  -FH Side: Right  -FH Location:  foot  -FH      Row Name             Wound 11/07/23 1238 Left lower arm    Wound - Properties Group Placement Date: 11/07/23  - Placement Time: 1238  -FH Side: Left  -FH Orientation: lower  -FH Location: arm  -FH    Retired Wound - Properties Group Placement Date: 11/07/23  - Placement Time: 1238  -FH Side: Left  -FH Orientation: lower  -FH Location: arm  -FH    Retired Wound - Properties Group Date first assessed: 11/07/23  - Time first assessed: 1238  -FH Side: Left  -FH Location: arm  -FH      Row Name 12/02/23 1200          Positioning and Restraints    Pre-Treatment Position in bed  -CS     Post Treatment Position bed  -CS     In Bed supine;call light within reach;encouraged to call for assist;exit alarm on  -CS       Row Name 12/02/23 1200          Progress Summary (PT)    Progress Toward Functional Goals (PT) progress toward functional goals is fair  -CS               User Key  (r) = Recorded By, (t) = Taken By, (c) = Cosigned By      Initials Name Provider Type    Karon Jordan, RN Registered Nurse     Carlos Cagle RN Registered Nurse    Ronald Huerta PTA Physical Therapist Assistant                  Section G              Section GG                       Physical Therapy Education       Title: PT OT SLP Therapies (Done)       Topic: Physical Therapy (Done)       Point: Mobility training (Done)       Learning Progress Summary             Patient Acceptance, E,TB, VU by  at 11/30/2023 0420    Acceptance, E,TB, VU by MOE at 11/8/2023 1341                         Point: Precautions (Done)       Learning Progress Summary             Patient Acceptance, E,TB, VU by  at 11/30/2023 0420    Acceptance, E,TB, VU by MOE at 11/8/2023 1341                                         User Key       Initials Effective Dates Name Provider Type Discipline     06/08/21 -  Bharath Berg, RN Registered Nurse Nurse    MOE 06/03/21 -  Merlin Rao, PT Physical Therapist PT                  PT  Recommendation and Plan     Progress Summary (PT)  Progress Toward Functional Goals (PT): progress toward functional goals is fair  Daily Progress Summary (PT): Decreased assistance for ther-ex's today in comparison to treatment I performed with pt. last weekend.  Pt. still requires assistance but overall, assistance level decreased with range of motion in bilateral lower extremeties.   Outcome Measures       Row Name 12/02/23 1200 11/30/23 1301 11/29/23 1500       How much help from another person do you currently need...    Turning from your back to your side while in flat bed without using bedrails? 3  -CS 3  -WM 2  -RH    Moving from lying on back to sitting on the side of a flat bed without bedrails? 3  -CS 3  -WM 2  -RH    Moving to and from a bed to a chair (including a wheelchair)? 1  -CS 1  -WM 1  -RH    Standing up from a chair using your arms (e.g., wheelchair, bedside chair)? 1  -CS 1  -WM 1  -RH    Climbing 3-5 steps with a railing? 1  -CS 1  -WM 1  -RH    To walk in hospital room? 1  -CS 1  -WM 1  -RH    AM-PAC 6 Clicks Score (PT) 10  -CS 10  -WM 8  -RH    Highest Level of Mobility Goal 4 --> Transfer to chair/commode  -CS 4 --> Transfer to chair/commode  -WM 3 --> Sit at edge of bed  -RH       Functional Assessment    Outcome Measure Options AM-PAC 6 Clicks Basic Mobility (PT)  -CS -- --              User Key  (r) = Recorded By, (t) = Taken By, (c) = Cosigned By      Initials Name Provider Type     Bijan Todd, CURTIS Physical Therapist Assistant     Carson Inman PTA Physical Therapist Assistant     Ronald Fabian PTA Physical Therapist Assistant                     Time Calculation:    PT Charges       Row Name 12/02/23 1239             Time Calculation    Start Time 1143  -CS      PT Received On 12/02/23  -CS         Timed Charges    16110 - PT Therapeutic Exercise Minutes 24  -CS         SNF Physical Therapy Minutes    Skilled Minutes- PT 24 min  -CS         Total Minutes    Timed  Charges Total Minutes 24  -CS       Total Minutes 24  -CS                User Key  (r) = Recorded By, (t) = Taken By, (c) = Cosigned By      Initials Name Provider Type    CS Ronald Fabian PTA Physical Therapist Assistant                  Therapy Charges for Today       Code Description Service Date Service Provider Modifiers Qty    [id number] HC PT THER PROC EA 15 MIN 12/2/2023 Ronald Fabian PTA GP 2            PT G-Codes  Outcome Measure Options: AM-PAC 6 Clicks Basic Mobility (PT)  AM-PAC 6 Clicks Score (PT): 10  AM-PAC 6 Clicks Score (OT): 11    Ronald Fabian PTA  12/2/2023

## 2023-12-02 NOTE — PLAN OF CARE
Goal Outcome Evaluation:  Plan of Care Reviewed With: patient        Progress: improving  Outcome Evaluation: rESIDENT ALERT, ORIENTED ABLE TO VICKY NEEDS KNOWN TO STAFF. rEMAINS IN BED THIS SHIFT. dID IN BED EXERCISES WITH THERAPIST TODAY.  Medicated for prn pain x2, effective. Blood glucose elevated for lunch and dinner, covered with sliding scale. continues with incontinence of stool as well as continent bm's uses urinal independent at bedside. resident pleasant and cooperative.

## 2023-12-02 NOTE — PLAN OF CARE
Goal Outcome Evaluation:  Plan of Care Reviewed With: patient        Progress: no change  Outcome Evaluation: Alert and oriented x4; able to make needs known to staff. Insulin administered  per MAR. Assists with turning to be cleaned up; no BM's this shift so far. Percocet administered x2 for complaints of left hip pain. Call light within reach; care plan ongoing.

## 2023-12-03 LAB
GLUCOSE BLDC GLUCOMTR-MCNC: 165 MG/DL (ref 70–99)
GLUCOSE BLDC GLUCOMTR-MCNC: 168 MG/DL (ref 70–99)
GLUCOSE BLDC GLUCOMTR-MCNC: 172 MG/DL (ref 70–99)
GLUCOSE BLDC GLUCOMTR-MCNC: 96 MG/DL (ref 70–99)

## 2023-12-03 PROCEDURE — 82948 REAGENT STRIP/BLOOD GLUCOSE: CPT

## 2023-12-03 PROCEDURE — 63710000001 INSULIN DETEMIR PER 5 UNITS: Performed by: PHYSICIAN ASSISTANT

## 2023-12-03 PROCEDURE — 63710000001 INSULIN LISPRO (HUMAN) PER 5 UNITS: Performed by: INTERNAL MEDICINE

## 2023-12-03 RX ADMIN — PREGABALIN 100 MG: 100 CAPSULE ORAL at 20:11

## 2023-12-03 RX ADMIN — FERROUS SULFATE TAB 325 MG (65 MG ELEMENTAL FE) 325 MG: 325 (65 FE) TAB at 09:06

## 2023-12-03 RX ADMIN — Medication 10 MG: at 00:20

## 2023-12-03 RX ADMIN — PREGABALIN 100 MG: 100 CAPSULE ORAL at 16:32

## 2023-12-03 RX ADMIN — HYDROCORTISONE 1 APPLICATION: 1 OINTMENT TOPICAL at 20:17

## 2023-12-03 RX ADMIN — OXYCODONE AND ACETAMINOPHEN 1 TABLET: 7.5; 325 TABLET ORAL at 00:18

## 2023-12-03 RX ADMIN — Medication 250 MG: at 09:06

## 2023-12-03 RX ADMIN — INSULIN DETEMIR 70 UNITS: 100 INJECTION, SOLUTION SUBCUTANEOUS at 09:07

## 2023-12-03 RX ADMIN — APIXABAN 5 MG: 5 TABLET, FILM COATED ORAL at 09:06

## 2023-12-03 RX ADMIN — INSULIN LISPRO 4 UNITS: 100 INJECTION, SOLUTION INTRAVENOUS; SUBCUTANEOUS at 12:15

## 2023-12-03 RX ADMIN — OXYCODONE AND ACETAMINOPHEN 1 TABLET: 7.5; 325 TABLET ORAL at 06:22

## 2023-12-03 RX ADMIN — APIXABAN 5 MG: 5 TABLET, FILM COATED ORAL at 20:11

## 2023-12-03 RX ADMIN — OXYCODONE AND ACETAMINOPHEN 1 TABLET: 7.5; 325 TABLET ORAL at 12:15

## 2023-12-03 RX ADMIN — HYDROCORTISONE 1 APPLICATION: 1 OINTMENT TOPICAL at 09:07

## 2023-12-03 RX ADMIN — INSULIN DETEMIR 60 UNITS: 100 INJECTION, SOLUTION SUBCUTANEOUS at 20:12

## 2023-12-03 RX ADMIN — LISINOPRIL 2.5 MG: 2.5 TABLET ORAL at 09:06

## 2023-12-03 RX ADMIN — Medication 250 MG: at 20:11

## 2023-12-03 RX ADMIN — ATORVASTATIN CALCIUM 10 MG: 10 TABLET, FILM COATED ORAL at 20:11

## 2023-12-03 RX ADMIN — CYANOCOBALAMIN TAB 500 MCG 1000 MCG: 500 TAB at 09:06

## 2023-12-03 RX ADMIN — FUROSEMIDE 40 MG: 40 TABLET ORAL at 09:06

## 2023-12-03 RX ADMIN — EMPAGLIFLOZIN 10 MG: 10 TABLET, FILM COATED ORAL at 09:06

## 2023-12-03 RX ADMIN — INSULIN LISPRO 4 UNITS: 100 INJECTION, SOLUTION INTRAVENOUS; SUBCUTANEOUS at 17:56

## 2023-12-03 RX ADMIN — Medication 10 MG: at 20:19

## 2023-12-03 RX ADMIN — OXYCODONE AND ACETAMINOPHEN 1 TABLET: 7.5; 325 TABLET ORAL at 20:11

## 2023-12-03 RX ADMIN — INSULIN LISPRO 4 UNITS: 100 INJECTION, SOLUTION INTRAVENOUS; SUBCUTANEOUS at 20:12

## 2023-12-03 RX ADMIN — HYDROCORTISONE 1 APPLICATION: 1 OINTMENT TOPICAL at 16:32

## 2023-12-03 RX ADMIN — PREGABALIN 100 MG: 100 CAPSULE ORAL at 09:06

## 2023-12-03 NOTE — PLAN OF CARE
Goal Outcome Evaluation:  Plan of Care Reviewed With: patient        Progress: no change  Outcome Evaluation: Resident alert, oriented, able to make needs known to staff. Remains bedbound, stand with therapy only. Blood glucose elevated for lunch and dinner, covered with sliding scale. treatment to rt foot done as orderd. improvement noted. Medicated for prn pain x1, effective. Resident pleasant and cooperative.

## 2023-12-03 NOTE — PLAN OF CARE
Goal Outcome Evaluation:  Plan of Care Reviewed With: patient        Progress: improving  Outcome Evaluation: Alert and oriented; able to make needs known to staff. Percocet administered for left hip pain. Melatoin given per request; slept in short intervals between care. BM x1 this shift. Utilizes urinal without difficulty. Call light within reach; care plan ongoing.

## 2023-12-04 LAB
GLUCOSE BLDC GLUCOMTR-MCNC: 109 MG/DL (ref 70–99)
GLUCOSE BLDC GLUCOMTR-MCNC: 139 MG/DL (ref 70–99)
GLUCOSE BLDC GLUCOMTR-MCNC: 156 MG/DL (ref 70–99)
GLUCOSE BLDC GLUCOMTR-MCNC: 159 MG/DL (ref 70–99)
GLUCOSE BLDC GLUCOMTR-MCNC: 187 MG/DL (ref 70–99)

## 2023-12-04 PROCEDURE — 97530 THERAPEUTIC ACTIVITIES: CPT

## 2023-12-04 PROCEDURE — 97116 GAIT TRAINING THERAPY: CPT

## 2023-12-04 PROCEDURE — 97110 THERAPEUTIC EXERCISES: CPT

## 2023-12-04 PROCEDURE — 63710000001 INSULIN DETEMIR PER 5 UNITS: Performed by: PHYSICIAN ASSISTANT

## 2023-12-04 PROCEDURE — 82948 REAGENT STRIP/BLOOD GLUCOSE: CPT

## 2023-12-04 PROCEDURE — 63710000001 INSULIN LISPRO (HUMAN) PER 5 UNITS: Performed by: INTERNAL MEDICINE

## 2023-12-04 RX ADMIN — FERROUS SULFATE TAB 325 MG (65 MG ELEMENTAL FE) 325 MG: 325 (65 FE) TAB at 08:59

## 2023-12-04 RX ADMIN — PREGABALIN 100 MG: 100 CAPSULE ORAL at 15:14

## 2023-12-04 RX ADMIN — INSULIN DETEMIR 60 UNITS: 100 INJECTION, SOLUTION SUBCUTANEOUS at 21:16

## 2023-12-04 RX ADMIN — LISINOPRIL 2.5 MG: 2.5 TABLET ORAL at 08:59

## 2023-12-04 RX ADMIN — Medication 250 MG: at 09:00

## 2023-12-04 RX ADMIN — CYANOCOBALAMIN TAB 500 MCG 1000 MCG: 500 TAB at 09:00

## 2023-12-04 RX ADMIN — Medication 250 MG: at 21:16

## 2023-12-04 RX ADMIN — INSULIN DETEMIR 70 UNITS: 100 INJECTION, SOLUTION SUBCUTANEOUS at 08:59

## 2023-12-04 RX ADMIN — OXYCODONE AND ACETAMINOPHEN 1 TABLET: 7.5; 325 TABLET ORAL at 21:16

## 2023-12-04 RX ADMIN — OXYCODONE AND ACETAMINOPHEN 1 TABLET: 7.5; 325 TABLET ORAL at 15:11

## 2023-12-04 RX ADMIN — PREGABALIN 100 MG: 100 CAPSULE ORAL at 21:16

## 2023-12-04 RX ADMIN — INSULIN LISPRO 4 UNITS: 100 INJECTION, SOLUTION INTRAVENOUS; SUBCUTANEOUS at 21:15

## 2023-12-04 RX ADMIN — APIXABAN 5 MG: 5 TABLET, FILM COATED ORAL at 21:16

## 2023-12-04 RX ADMIN — BACLOFEN 10 MG: 10 TABLET ORAL at 10:54

## 2023-12-04 RX ADMIN — HYDROCORTISONE 1 APPLICATION: 1 OINTMENT TOPICAL at 21:19

## 2023-12-04 RX ADMIN — HYDROCORTISONE 1 APPLICATION: 1 OINTMENT TOPICAL at 15:12

## 2023-12-04 RX ADMIN — EMPAGLIFLOZIN 10 MG: 10 TABLET, FILM COATED ORAL at 09:00

## 2023-12-04 RX ADMIN — HYDROCORTISONE 1 APPLICATION: 1 OINTMENT TOPICAL at 09:01

## 2023-12-04 RX ADMIN — PREGABALIN 100 MG: 100 CAPSULE ORAL at 09:00

## 2023-12-04 RX ADMIN — APIXABAN 5 MG: 5 TABLET, FILM COATED ORAL at 09:00

## 2023-12-04 RX ADMIN — FUROSEMIDE 40 MG: 40 TABLET ORAL at 08:59

## 2023-12-04 RX ADMIN — OXYCODONE AND ACETAMINOPHEN 1 TABLET: 7.5; 325 TABLET ORAL at 09:00

## 2023-12-04 RX ADMIN — OXYCODONE AND ACETAMINOPHEN 1 TABLET: 7.5; 325 TABLET ORAL at 02:33

## 2023-12-04 RX ADMIN — INSULIN LISPRO 4 UNITS: 100 INJECTION, SOLUTION INTRAVENOUS; SUBCUTANEOUS at 18:05

## 2023-12-04 RX ADMIN — ATORVASTATIN CALCIUM 10 MG: 10 TABLET, FILM COATED ORAL at 21:16

## 2023-12-04 NOTE — THERAPY TREATMENT NOTE
SNF - Physical Therapy Progress Note  KEO Dominguez     Patient Name: Jose Shaikh  : 1960  MRN: [id number]  Today's Date: 2023      Visit Dx:    ICD-10-CM ICD-9-CM   1. Difficulty in walking  R26.2 719.7     Patient Active Problem List   Diagnosis    Diabetic ulcer of left heel associated with type 2 DM    Acute osteomyelitis of left calcaneus     Diabetic ulcer of left heel associated with type 2 DM    Diabetic ulcer of right midfoot associated with type 2 DM    Paroxysmal atrial fibrillation    Essential hypertension    Hyperlipidemia LDL goal <100    Cellulitis and abscess of foot    High alkaline phosphatase level    Osteomyelitis    Onychomycosis    Onychocryptosis    Foot pain, bilateral    Osteomyelitis of foot, right, acute    Cellulitis of right foot    Type 2 diabetes mellitus, with long-term current use of insulin    Class 3 severe obesity due to excess calories with serious comorbidity and body mass index (BMI) of 45.0 to 49.9 in adult    Anxiety disorder, unspecified    Claustrophobia    Dependence on wheelchair    Depression, unspecified    Long term (current) use of anticoagulants    Long term (current) use of oral hypoglycemic drugs    Wound of foot    Non-prs chronic ulcer oth prt r foot limited to brkdwn skin    Orthostatic hypotension    Other chronic osteomyelitis, right ankle and foot    Personal history of nicotine dependence    Thrombocytopenia, unspecified    Unspecified open wound, right foot, initial encounter    Diabetic foot infection    Subacute osteomyelitis of right foot    Right foot pain    Sepsis    Onychomycosis    Foot pain, left    Impaired mobility and ADLs    Absence of toe of right foot    Corns and callosity    Disability of walking    Fracture    Limb swelling    Polyneuropathy    Pressure ulcer, stage 1    Shortness of breath    Generalized weakness    Debility     Past Medical History:   Diagnosis Date    Absence of toe of right foot     Acute osteomyelitis of  left calcaneus  08/18/2021    Anxiety and depression     Arthritis     Cancer     Claustrophobia     Corns and callus     Diabetic ulcer of left heel associated with type 2 DM 08/18/2021    Diabetic ulcer of left heel associated with type 2 DM 07/06/2021    Diabetic ulcer of right midfoot associated with type 2 DM 08/18/2021    Difficulty walking     Essential hypertension 08/31/2021    Hammertoe     Hyperlipidemia LDL goal <100 08/31/2021    Ingrown toenail     Obesity     Paroxysmal atrial fibrillation 08/31/2021    Polyneuropathy     Pressure ulcer, stage 1     Tinea unguium     Type 2 diabetes mellitus with polyneuropathy      Past Surgical History:   Procedure Laterality Date    CYST REMOVAL      center of back; benign    EYE SURGERY      INCISION AND DRAINAGE ABSCESS      back    INCISION AND DRAINAGE LEG Right 12/10/2021    Procedure: INCISION AND DRAINAGE LOWER EXTREMITY;  Surgeon: Ash Leyva DPM;  Location: [facility];  Service: Podiatry;  Laterality: Right;    OTHER SURGICAL HISTORY      Surgical clips left foot    TOE SURGERY Right     Removal of 5th toe    TRANS METATARSAL AMPUTATION Right 12/02/2021    Procedure: AMPUTATION TRANS METATARSAL;  Surgeon: Ash Leyva DPM;  Location: [facility];  Service: Podiatry;  Laterality: Right;    VASCULAR SURGERY      WRIST SURGERY Left     repair of injury       PT Assessment (last 12 hours)       PT Evaluation and Treatment       Row Name 12/04/23 1300          Physical Therapy Time and Intention    Subjective Information complains of;weakness  -CS     Document Type therapy note (daily note)  -CS     Mode of Treatment individual therapy;physical therapy  -CS     Patient Effort good  -CS     Symptoms Noted During/After Treatment fatigue;increased pain  -CS       Row Name 12/04/23 1300          Pain    Posttreatment Pain Rating 7/10  -CS       Row Name 12/04/23 1300          Pain Scale: FACES Pre/Post-Treatment    Pain: FACES Scale,  Pretreatment 0-->no hurt  -CS     Posttreatment Pain Rating 8-->hurts whole lot  -CS     Pain Location - Side/Orientation Left  -     Pain Location - hip;knee  -       Row Name 12/04/23 1300          Bed Mobility    Supine-Sit-Supine Bent (Bed Mobility) verbal cues;moderate assist (50% patient effort);1 person assist  -     Bed Mobility, Safety Issues decreased use of legs for bridging/pushing  -     Assistive Device (Bed Mobility) bed rails;head of bed elevated;overhead trapeze  -       Row Name 12/04/23 1300          Sit-Stand Transfer    Sit-Stand Bent (Transfers) 2 person assist;verbal cues;moderate assist (50% patient effort)  -     Assistive Device (Sit-Stand Transfers) bariatric;walker, front-wheeled  -     Comment, (Sit-Stand Transfer) 3 STS's performed with weight shifting performed from side to side in standing  -       Row Name 12/04/23 1300          Stand-Sit Transfer    Stand-Sit Bent (Transfers) moderate assist (50% patient effort);2 person assist  -     Assistive Device (Stand-Sit Transfers) bariatric;walker, front-wheeled  -       Row Name 12/04/23 1300          Hip (Therapeutic Exercise)    Hip AAROM (Therapeutic Exercise) bilateral;aBduction;aDduction;external rotation;internal rotation;supine;10 repetitions;3 sets  heel slides  -     Hip Isometrics (Therapeutic Exercise) bilateral;gluteal sets;supine;10 repetitions;3 sets  -       Row Name 12/04/23 1300          Knee (Therapeutic Exercise)    Knee AAROM (Therapeutic Exercise) bilateral;supine;10 repetitions;3 sets  SLR's  -     Knee Isometrics (Therapeutic Exercise) bilateral;quad sets;supine;10 repetitions;3 sets  -     Knee Strengthening (Therapeutic Exercise) bilateral;SAQ (short arc quad);supine;3 lb free weight;30 repititions  -       Row Name 12/04/23 1300          Ankle (Therapeutic Exercise)    Ankle AROM (Therapeutic Exercise) bilateral;dorsiflexion;plantarflexion;supine;30 repititions   -CS       Row Name             Wound 11/01/23 1312 Left anterior second toe Blisters    Wound - Properties Group Placement Date: 11/01/23  -RASHARD Placement Time: 1312  -RASHARD Present on Original Admission: N  -RASHARD Side: Left  -RASHARD Orientation: anterior  -RASHARD Location: second toe  -RASHARD Primary Wound Type: Blisters  -RASHARD    Retired Wound - Properties Group Placement Date: 11/01/23  -RASHARD Placement Time: 1312  -RASHARD Present on Original Admission: N  -RASHARD Side: Left  -RASHARD Orientation: anterior  -RASHARD Location: second toe  -RASHARD Primary Wound Type: Blisters  -RASHARD    Retired Wound - Properties Group Date first assessed: 11/01/23  -RASHARD Time first assessed: 1312  -RASHARD Present on Original Admission: N  -RASHARD Side: Left  -RASHARD Location: second toe  -RASHARD Primary Wound Type: Blisters  -RASHARD      Row Name             Wound 11/07/23 1240 Right anterior foot    Wound - Properties Group Placement Date: 11/07/23  -FH Placement Time: 1240  -FH Side: Right  -FH Orientation: anterior  -FH Location: foot  -FH    Retired Wound - Properties Group Placement Date: 11/07/23  -FH Placement Time: 1240  -FH Side: Right  -FH Orientation: anterior  -FH Location: foot  -FH    Retired Wound - Properties Group Date first assessed: 11/07/23  - Time first assessed: 1240  -FH Side: Right  -FH Location: foot  -FH      Row Name             Wound 11/07/23 1238 Left lower arm    Wound - Properties Group Placement Date: 11/07/23  - Placement Time: 1238  -FH Side: Left  -FH Orientation: lower  -FH Location: arm  -FH    Retired Wound - Properties Group Placement Date: 11/07/23  - Placement Time: 1238  -FH Side: Left  -FH Orientation: lower  -FH Location: arm  -FH    Retired Wound - Properties Group Date first assessed: 11/07/23  - Time first assessed: 1238  -FH Side: Left  -FH Location: arm  -FH      Row Name 12/04/23 1300          Positioning and Restraints    Pre-Treatment Position in bed  -CS     Post Treatment Position bed  -CS     In Bed fowlers;call light within  reach;encouraged to call for assist  bed alarm was not active upon entering room  -CS       Row Name 12/04/23 1300          Progress Summary (PT)    Progress Toward Functional Goals (PT) progress toward functional goals is fair  -CS               User Key  (r) = Recorded By, (t) = Taken By, (c) = Cosigned By      Initials Name Provider Type    Karon Jordan, RN Registered Nurse     Carlos Cagle RN Registered Nurse    Ronald Huerta PTA Physical Therapist Assistant                  Section G              Section GG                       Physical Therapy Education       Title: PT OT SLP Therapies (Done)       Topic: Physical Therapy (Done)       Point: Mobility training (Done)       Learning Progress Summary             Patient Acceptance, E,TB, VU by  at 11/30/2023 0420    Acceptance, E,TB, VU by MOE at 11/8/2023 1341                         Point: Precautions (Done)       Learning Progress Summary             Patient Acceptance, E,TB, VU by  at 11/30/2023 0420    Acceptance, E,TB, VU by MOE at 11/8/2023 1341                                         User Key       Initials Effective Dates Name Provider Type Discipline     06/08/21 -  Bharath Berg RN Registered Nurse Nurse    MOE 06/03/21 -  Merlin Rao, PT Physical Therapist PT                  PT Recommendation and Plan     Progress Summary (PT)  Progress Toward Functional Goals (PT): progress toward functional goals is fair  Daily Progress Summary (PT): Decreased assistance for ther-ex's today in comparison to treatment I performed with pt. last weekend.  Pt. still requires assistance but overall, assistance level decreased with range of motion in bilateral lower extremeties.   Outcome Measures       Row Name 12/04/23 1300 12/02/23 1200          How much help from another person do you currently need...    Turning from your back to your side while in flat bed without using bedrails? 3  -CS 3  -CS     Moving from lying on back to  sitting on the side of a flat bed without bedrails? 3  -CS 3  -CS     Moving to and from a bed to a chair (including a wheelchair)? 1  -CS 1  -CS     Standing up from a chair using your arms (e.g., wheelchair, bedside chair)? 2  -CS 1  -CS     Climbing 3-5 steps with a railing? 1  -CS 1  -CS     To walk in hospital room? 1  -CS 1  -CS     AM-PAC 6 Clicks Score (PT) 11  -CS 10  -CS     Highest Level of Mobility Goal 4 --> Transfer to chair/commode  -CS 4 --> Transfer to chair/commode  -CS        Functional Assessment    Outcome Measure Options AM-PAC 6 Clicks Basic Mobility (PT)  -CS AM-PAC 6 Clicks Basic Mobility (PT)  -CS               User Key  (r) = Recorded By, (t) = Taken By, (c) = Cosigned By      Initials Name Provider Type    CS Ronald Fabian PTA Physical Therapist Assistant                     Time Calculation:    PT Charges       Row Name 12/04/23 1335             Time Calculation    Start Time 1000  -CS      PT Received On 12/04/23  -CS         Timed Charges    91973 - PT Therapeutic Exercise Minutes 22  -CS      13728 - Gait Training Minutes  11  -CS      72017 - PT Therapeutic Activity Minutes 6  -CS         SNF Physical Therapy Minutes    Skilled Minutes- PT 39 min  -CS         Total Minutes    Timed Charges Total Minutes 39  -CS       Total Minutes 39  -CS                User Key  (r) = Recorded By, (t) = Taken By, (c) = Cosigned By      Initials Name Provider Type    CS Ronald Fabian PTA Physical Therapist Assistant                  Therapy Charges for Today       Code Description Service Date Service Provider Modifiers Qty    34120871959 HC PT THER PROC EA 15 MIN 12/4/2023 Ronald Fabian PTA GP 2    31662654492 HC GAIT TRAINING EA 15 MIN 12/4/2023 Ronald Fabian PTA GP 1            PT G-Codes  Outcome Measure Options: AM-PAC 6 Clicks Basic Mobility (PT)  AM-PAC 6 Clicks Score (PT): 11  AM-PAC 6 Clicks Score (OT): 16    Ronald Fabian PTA  12/4/2023

## 2023-12-04 NOTE — PLAN OF CARE
Goal Outcome Evaluation:              Outcome Evaluation: Plan of care continues at this time. Patient is alert and oriented and able to make needs known. Patient uses bedpan for Bowel Movements and urinal. Patient did work with therapy this am, patient stood at side of bed with 2 x therapy assist/walker and gait belt with therapy as well as with nurse and PCA did standby assist therapist with patient standing at side of bed, patient with immobilizer to L LE when up. Patient medicated PRN pain L hip-see eMAR for medication administration details. Dressing to R foot changed, L FA dressing to skin tear changed. Barrier ointment applied to buttocks, coccyx, powder to groin and abdominal folds after bathing and with incontinence and yonis care.  Blood glucose monitored. Patient educated on importance of repositioning to alleviate pressure and the importance of carb consistent diet. Call light in reach.

## 2023-12-04 NOTE — THERAPY TREATMENT NOTE
Patient Name: Jose Shaikh  : 1960    MRN: [id number]                              Today's Date: 2023       Admit Date: 2023    Visit Dx:     ICD-10-CM ICD-9-CM   1. Difficulty in walking  R26.2 719.7     Patient Active Problem List   Diagnosis    Diabetic ulcer of left heel associated with type 2 DM    Acute osteomyelitis of left calcaneus     Diabetic ulcer of left heel associated with type 2 DM    Diabetic ulcer of right midfoot associated with type 2 DM    Paroxysmal atrial fibrillation    Essential hypertension    Hyperlipidemia LDL goal <100    Cellulitis and abscess of foot    High alkaline phosphatase level    Osteomyelitis    Onychomycosis    Onychocryptosis    Foot pain, bilateral    Osteomyelitis of foot, right, acute    Cellulitis of right foot    Type 2 diabetes mellitus, with long-term current use of insulin    Class 3 severe obesity due to excess calories with serious comorbidity and body mass index (BMI) of 45.0 to 49.9 in adult    Anxiety disorder, unspecified    Claustrophobia    Dependence on wheelchair    Depression, unspecified    Long term (current) use of anticoagulants    Long term (current) use of oral hypoglycemic drugs    Wound of foot    Non-prs chronic ulcer oth prt r foot limited to brkdwn skin    Orthostatic hypotension    Other chronic osteomyelitis, right ankle and foot    Personal history of nicotine dependence    Thrombocytopenia, unspecified    Unspecified open wound, right foot, initial encounter    Diabetic foot infection    Subacute osteomyelitis of right foot    Right foot pain    Sepsis    Onychomycosis    Foot pain, left    Impaired mobility and ADLs    Absence of toe of right foot    Corns and callosity    Disability of walking    Fracture    Limb swelling    Polyneuropathy    Pressure ulcer, stage 1    Shortness of breath    Generalized weakness    Debility     Past Medical History:   Diagnosis Date    Absence of toe of right foot     Acute  osteomyelitis of left calcaneus  08/18/2021    Anxiety and depression     Arthritis     Cancer     Claustrophobia     Corns and callus     Diabetic ulcer of left heel associated with type 2 DM 08/18/2021    Diabetic ulcer of left heel associated with type 2 DM 07/06/2021    Diabetic ulcer of right midfoot associated with type 2 DM 08/18/2021    Difficulty walking     Essential hypertension 08/31/2021    Hammertoe     Hyperlipidemia LDL goal <100 08/31/2021    Ingrown toenail     Obesity     Paroxysmal atrial fibrillation 08/31/2021    Polyneuropathy     Pressure ulcer, stage 1     Tinea unguium     Type 2 diabetes mellitus with polyneuropathy      Past Surgical History:   Procedure Laterality Date    CYST REMOVAL      center of back; benign    EYE SURGERY      INCISION AND DRAINAGE ABSCESS      back    INCISION AND DRAINAGE LEG Right 12/10/2021    Procedure: INCISION AND DRAINAGE LOWER EXTREMITY;  Surgeon: Ash Leyva DPM;  Location: [facility];  Service: Podiatry;  Laterality: Right;    OTHER SURGICAL HISTORY      Surgical clips left foot    TOE SURGERY Right     Removal of 5th toe    TRANS METATARSAL AMPUTATION Right 12/02/2021    Procedure: AMPUTATION TRANS METATARSAL;  Surgeon: Ash Leyva DPM;  Location: [facility] OR;  Service: Podiatry;  Laterality: Right;    VASCULAR SURGERY      WRIST SURGERY Left     repair of injury      General Information       Row Name 12/04/23 1028          OT Time and Intention    Document Type therapy note (daily note)  -PG     Mode of Treatment occupational therapy;individual therapy  -PG               User Key  (r) = Recorded By, (t) = Taken By, (c) = Cosigned By      Initials Name Provider Type    PG Kodak Matos OT Occupational Therapist                     Mobility/ADL's       Row Name 12/04/23 102          Sit-Stand Transfer    Sit-Stand [place] (Transfers) 2 person assist;verbal cues;moderate assist (50% patient effort)  -PG     Assistive  Device (Sit-Stand Transfers) bariatric;walker, front-wheeled  -PG       Row Name 12/04/23 1029          Stand-Sit Transfer    Stand-Sit [place] (Transfers) moderate assist (50% patient effort);2 person assist  -PG     Assistive Device (Stand-Sit Transfers) bariatric;walker, front-wheeled  -PG               User Key  (r) = Recorded By, (t) = Taken By, (c) = Cosigned By      Initials Name Provider Type    Kodak Velazco OT Occupational Therapist                   Obj/Interventions    No documentation.                  Goals/Plan    No documentation.                  Clinical Impression       Row Name 12/04/23 1029          Plan of Care Review    Progress no change  -PG               User Key  (r) = Recorded By, (t) = Taken By, (c) = Cosigned By      Initials Name Provider Type    Kodak Velazco OT Occupational Therapist                   Outcome Measures       Row Name 12/04/23 1030          How much help from another is currently needed...    Putting on and taking off regular lower body clothing? 1  -PG     Bathing (including washing, rinsing, and drying) 2  -PG     Toileting (which includes using toilet bed pan or urinal) 1  -PG     Putting on and taking off regular upper body clothing 4  -PG     Taking care of personal grooming (such as brushing teeth) 4  -PG     Eating meals 4  -PG     AM-PAC 6 Clicks Score (OT) 16  -PG       Row Name 12/04/23 1030          Functional Assessment    Outcome Measure Options AM-PAC 6 Clicks Daily Activity (OT);Optimal Instrument  -PG       Row Name 12/04/23 1030          Optimal Instrument    Optimal Instrument Optimal - 3  -PG     Bending/Stooping 5  -PG     Standing 4  -PG     Reaching 1  -PG               User Key  (r) = Recorded By, (t) = Taken By, (c) = Cosigned By      Initials Name Provider Type    Kodak Velazco OT Occupational Therapist                    Occupational Therapy Education       Title: PT OT SLP Therapies (Done)       Topic: Occupational Therapy  (Done)       Point: ADL training (Done)       Description:   Instruct learner(s) on proper safety adaptation and remediation techniques during self care or transfers.   Instruct in proper use of assistive devices.                  Learning Progress Summary             Patient Acceptance, E,TB, VU by  at 11/30/2023 0420    Acceptance, E,D, DU by PG at 11/7/2023 0932                         Point: Home exercise program (Done)       Description:   Instruct learner(s) on appropriate technique for monitoring, assisting and/or progressing therapeutic exercises/activities.                  Learning Progress Summary             Patient Acceptance, E,TB, VU by  at 11/30/2023 0420    Acceptance, E,D, DU by PG at 11/7/2023 0932                         Point: Precautions (Done)       Description:   Instruct learner(s) on prescribed precautions during self-care and functional transfers.                  Learning Progress Summary             Patient Acceptance, E,TB, VU by  at 11/30/2023 0420    Acceptance, E,D, DU by PG at 11/7/2023 0932                         Point: Body mechanics (Done)       Description:   Instruct learner(s) on proper positioning and spine alignment during self-care, functional mobility activities and/or exercises.                  Learning Progress Summary             Patient Acceptance, E,TB, VU by  at 11/30/2023 0420    Acceptance, E,D, DU by PG at 11/7/2023 0932                                         User Key       Initials Effective Dates Name Provider Type Discipline     06/08/21 -  Bharath Berg, RN Registered Nurse Nurse    PG 06/16/21 -  Kodak Matos OT Occupational Therapist OT                  OT Recommendation and Plan  Planned Therapy Interventions (OT): activity tolerance training, BADL retraining, strengthening exercise, transfer/mobility retraining, patient/caregiver education/training, occupation/activity based interventions  Therapy Frequency (OT): 5 times/wk  Plan of Care  Review  Plan of Care Reviewed With: patient  Progress: no change  Outcome Evaluation: No significant change noted.  Patient continues to participate in upper body strengthening exercises and sit to stand transfers with moderate to maximal assist x 2 required.  Patient stood for approximately 1 minute today.  Patient then attempted to walk with the bed being pushed behind him.  Patient however unable to stand at this point.  Continue OT services per plan of care     Time Calculation:   Evaluation Complexity (OT)  Review Occupational Profile/Medical/Therapy History Complexity: brief/low complexity  Assessment, Occupational Performance/Identification of Deficit Complexity: 3-5 performance deficits  Clinical Decision Making Complexity (OT): problem focused assessment/low complexity  Overall Complexity of Evaluation (OT): low complexity     Time Calculation- OT       Row Name 12/04/23 1030             Time Calculation- OT    OT Received On 12/04/23  -PG      OT Goal Re-Cert Due Date 12/27/23  -PG         Timed Charges    90588 - OT Therapeutic Activity Minutes 20  -PG         SNF Occupational Therapy Minutes    Skilled Minutes- OT 20 min  -PG         Total Minutes    Timed Charges Total Minutes 20  -PG       Total Minutes 20  -PG                User Key  (r) = Recorded By, (t) = Taken By, (c) = Cosigned By      Initials Name Provider Type    PG Kodak Matos OT Occupational Therapist                  Therapy Charges for Today       Code Description Service Date Service Provider Modifiers Qty    30778991921 HC OT THERAPEUTIC ACT EA 15 MIN 12/4/2023 Kodak Matos OT GO 1                 Kodak Matos OT  12/4/2023

## 2023-12-04 NOTE — PLAN OF CARE
Goal Outcome Evaluation:           Progress: no change       Rsd. Is alert and oriented x4, able to make needs known to staff.  Rsd. Has requested to use bedpan this shift, but is able to use overhead trapeze bar to pull self up and reposition in bed with assistance.  Rsd. Has been incontinent of bowel at times this shift.  Medicated x2 with prn percocet this shift, Rsd. States effective.  Rsd. Sleeping in between care.  Call light and personal items within reach, Rsd. Reminded to use call light for assistance, verbalizes understanding.  Will continue to monitor and notify on-coming staff.  Current plan of care continues at this time.

## 2023-12-05 LAB
GLUCOSE BLDC GLUCOMTR-MCNC: 128 MG/DL (ref 70–99)
GLUCOSE BLDC GLUCOMTR-MCNC: 138 MG/DL (ref 70–99)
GLUCOSE BLDC GLUCOMTR-MCNC: 153 MG/DL (ref 70–99)
GLUCOSE BLDC GLUCOMTR-MCNC: 92 MG/DL (ref 70–99)

## 2023-12-05 PROCEDURE — 97110 THERAPEUTIC EXERCISES: CPT

## 2023-12-05 PROCEDURE — 63710000001 INSULIN DETEMIR PER 5 UNITS: Performed by: PHYSICIAN ASSISTANT

## 2023-12-05 PROCEDURE — 63710000001 INSULIN LISPRO (HUMAN) PER 5 UNITS: Performed by: INTERNAL MEDICINE

## 2023-12-05 PROCEDURE — 97530 THERAPEUTIC ACTIVITIES: CPT

## 2023-12-05 PROCEDURE — 82948 REAGENT STRIP/BLOOD GLUCOSE: CPT

## 2023-12-05 RX ORDER — OXYCODONE AND ACETAMINOPHEN 7.5; 325 MG/1; MG/1
1 TABLET ORAL EVERY 6 HOURS PRN
Status: DISCONTINUED | OUTPATIENT
Start: 2023-12-05 | End: 2024-03-18

## 2023-12-05 RX ADMIN — APIXABAN 5 MG: 5 TABLET, FILM COATED ORAL at 09:18

## 2023-12-05 RX ADMIN — APIXABAN 5 MG: 5 TABLET, FILM COATED ORAL at 21:35

## 2023-12-05 RX ADMIN — HYDROCORTISONE 1 APPLICATION: 1 OINTMENT TOPICAL at 09:20

## 2023-12-05 RX ADMIN — Medication 10 MG: at 00:44

## 2023-12-05 RX ADMIN — Medication 250 MG: at 09:18

## 2023-12-05 RX ADMIN — Medication 250 MG: at 21:34

## 2023-12-05 RX ADMIN — EMPAGLIFLOZIN 10 MG: 10 TABLET, FILM COATED ORAL at 09:19

## 2023-12-05 RX ADMIN — OXYCODONE AND ACETAMINOPHEN 1 TABLET: 7.5; 325 TABLET ORAL at 09:18

## 2023-12-05 RX ADMIN — PREGABALIN 100 MG: 100 CAPSULE ORAL at 16:18

## 2023-12-05 RX ADMIN — OXYCODONE AND ACETAMINOPHEN 1 TABLET: 7.5; 325 TABLET ORAL at 03:16

## 2023-12-05 RX ADMIN — CYANOCOBALAMIN TAB 500 MCG 1000 MCG: 500 TAB at 09:18

## 2023-12-05 RX ADMIN — OXYCODONE AND ACETAMINOPHEN 1 TABLET: 7.5; 325 TABLET ORAL at 21:38

## 2023-12-05 RX ADMIN — PREGABALIN 100 MG: 100 CAPSULE ORAL at 09:19

## 2023-12-05 RX ADMIN — INSULIN DETEMIR 70 UNITS: 100 INJECTION, SOLUTION SUBCUTANEOUS at 09:17

## 2023-12-05 RX ADMIN — HYDROCORTISONE 1 APPLICATION: 1 OINTMENT TOPICAL at 16:18

## 2023-12-05 RX ADMIN — FERROUS SULFATE TAB 325 MG (65 MG ELEMENTAL FE) 325 MG: 325 (65 FE) TAB at 09:19

## 2023-12-05 RX ADMIN — Medication 10 MG: at 21:35

## 2023-12-05 RX ADMIN — ATORVASTATIN CALCIUM 10 MG: 10 TABLET, FILM COATED ORAL at 21:35

## 2023-12-05 RX ADMIN — HYDROCORTISONE 1 APPLICATION: 1 OINTMENT TOPICAL at 21:37

## 2023-12-05 RX ADMIN — INSULIN LISPRO 4 UNITS: 100 INJECTION, SOLUTION INTRAVENOUS; SUBCUTANEOUS at 12:15

## 2023-12-05 RX ADMIN — OXYCODONE AND ACETAMINOPHEN 1 TABLET: 7.5; 325 TABLET ORAL at 16:18

## 2023-12-05 RX ADMIN — PREGABALIN 100 MG: 100 CAPSULE ORAL at 21:35

## 2023-12-05 RX ADMIN — LISINOPRIL 2.5 MG: 2.5 TABLET ORAL at 09:17

## 2023-12-05 RX ADMIN — INSULIN DETEMIR 60 UNITS: 100 INJECTION, SOLUTION SUBCUTANEOUS at 21:35

## 2023-12-05 NOTE — PLAN OF CARE
Goal Outcome Evaluation:           Progress: no change     Rsd. Is alert and oriented x4, able to make needs known to staff.  Rsd. Is 2+ max assistance to stand.  Bed/chair alert remain in  place.  Rsd. Has refused q2h turns this shift, but has repositioned and shifted weight using trapeze bar. Call light and personal items within reach, Rsd. Reminded to use call light for assistance, verbalizes understanding.  Will continue to monitor and notify on-coming staff.

## 2023-12-05 NOTE — THERAPY TREATMENT NOTE
SNF - Physical Therapy Treatment Note  KEO Dominguez     Patient Name: Jose Shaikh  : 1960  MRN: [id number]  Today's Date: 2023      Visit Dx:    ICD-10-CM ICD-9-CM   1. Difficulty in walking  R26.2 719.7     Patient Active Problem List   Diagnosis    Diabetic ulcer of left heel associated with type 2 DM    Acute osteomyelitis of left calcaneus     Diabetic ulcer of left heel associated with type 2 DM    Diabetic ulcer of right midfoot associated with type 2 DM    Paroxysmal atrial fibrillation    Essential hypertension    Hyperlipidemia LDL goal <100    Cellulitis and abscess of foot    High alkaline phosphatase level    Osteomyelitis    Onychomycosis    Onychocryptosis    Foot pain, bilateral    Osteomyelitis of foot, right, acute    Cellulitis of right foot    Type 2 diabetes mellitus, with long-term current use of insulin    Class 3 severe obesity due to excess calories with serious comorbidity and body mass index (BMI) of 45.0 to 49.9 in adult    Anxiety disorder, unspecified    Claustrophobia    Dependence on wheelchair    Depression, unspecified    Long term (current) use of anticoagulants    Long term (current) use of oral hypoglycemic drugs    Wound of foot    Non-prs chronic ulcer oth prt r foot limited to brkdwn skin    Orthostatic hypotension    Other chronic osteomyelitis, right ankle and foot    Personal history of nicotine dependence    Thrombocytopenia, unspecified    Unspecified open wound, right foot, initial encounter    Diabetic foot infection    Subacute osteomyelitis of right foot    Right foot pain    Sepsis    Onychomycosis    Foot pain, left    Impaired mobility and ADLs    Absence of toe of right foot    Corns and callosity    Disability of walking    Fracture    Limb swelling    Polyneuropathy    Pressure ulcer, stage 1    Shortness of breath    Generalized weakness    Debility     Past Medical History:   Diagnosis Date    Absence of toe of right foot     Acute osteomyelitis of  left calcaneus  08/18/2021    Anxiety and depression     Arthritis     Cancer     Claustrophobia     Corns and callus     Diabetic ulcer of left heel associated with type 2 DM 08/18/2021    Diabetic ulcer of left heel associated with type 2 DM 07/06/2021    Diabetic ulcer of right midfoot associated with type 2 DM 08/18/2021    Difficulty walking     Essential hypertension 08/31/2021    Hammertoe     Hyperlipidemia LDL goal <100 08/31/2021    Ingrown toenail     Obesity     Paroxysmal atrial fibrillation 08/31/2021    Polyneuropathy     Pressure ulcer, stage 1     Tinea unguium     Type 2 diabetes mellitus with polyneuropathy      Past Surgical History:   Procedure Laterality Date    CYST REMOVAL      center of back; benign    EYE SURGERY      INCISION AND DRAINAGE ABSCESS      back    INCISION AND DRAINAGE LEG Right 12/10/2021    Procedure: INCISION AND DRAINAGE LOWER EXTREMITY;  Surgeon: Ash Leyva DPM;  Location: [facility];  Service: Podiatry;  Laterality: Right;    OTHER SURGICAL HISTORY      Surgical clips left foot    TOE SURGERY Right     Removal of 5th toe    TRANS METATARSAL AMPUTATION Right 12/02/2021    Procedure: AMPUTATION TRANS METATARSAL;  Surgeon: Ash Leyva DPM;  Location: [facility];  Service: Podiatry;  Laterality: Right;    VASCULAR SURGERY      WRIST SURGERY Left     repair of injury       PT Assessment (last 12 hours)       PT Evaluation and Treatment       Row Name 12/05/23 1434          Physical Therapy Time and Intention    Subjective Information complains of;pain  -WM     Document Type therapy note (daily note)  -WM     Mode of Treatment individual therapy;physical therapy  -WM     Patient Effort good  -WM     Symptoms Noted During/After Treatment fatigue;increased pain  -WM       Row Name 12/05/23 1434          Bed Mobility    Supine-Sit [place] (Bed Mobility) moderate assist (50% patient effort);verbal cues  -WM     Assistive Device (Bed Mobility)  bed rails;draw sheet;leg ;overhead trapeze  -       Row Name 12/05/23 1434          Sit-Stand Transfer    Sit-Stand [place] (Transfers) moderate assist (50% patient effort);2 person assist;verbal cues  -WM     Assistive Device (Sit-Stand Transfers) bariatric;walker, front-wheeled  -WM     Comment, (Sit-Stand Transfer) Pt was able to stand 30 seconds  -       Row Name 12/05/23 1434          Stand-Sit Transfer    Stand-Sit [place] (Transfers) moderate assist (50% patient effort);2 person assist;verbal cues  -WM     Assistive Device (Stand-Sit Transfers) bariatric;walker, front-wheeled  -WM       Row Name 12/05/23 1434          Gait/Stairs (Locomotion)    [place] Level (Gait) moderate assist (50% patient effort);2 person assist;verbal cues  -WM     Assistive Device (Gait) bariatric equipment;walker, front-wheeled  -WM     Pattern (Gait) 4-point;step-to  -WM     Deviations/Abnormal Patterns (Gait) gait speed decreased;stride length decreased  -     Comment, (Gait/Stairs) Pt was able to ambulate ~ 3 inches but he needed assistance to advance his RLE  -       Row Name 12/05/23 1434          Safety Issues, Functional Mobility    Impairments Affecting Function (Mobility) balance;endurance/activity tolerance;pain;range of motion (ROM);strength  -       Row Name 12/05/23 1434          Hip (Therapeutic Exercise)    Hip AAROM (Therapeutic Exercise) bilateral;aBduction;aDduction;supine;10 repetitions;2 sets  -     Hip Isometrics (Therapeutic Exercise) bilateral;gluteal sets;supine;10 repetitions;3 second hold;2 sets  -       Row Name 12/05/23 1434          Knee (Therapeutic Exercise)    Knee Isometrics (Therapeutic Exercise) bilateral;quad sets;supine;10 repetitions;3 second hold;2 sets  -     Knee Strengthening (Therapeutic Exercise) bilateral;SAQ (short arc quad);4 lb free weight;20 repititions;SLR (straight leg raise)  Active-assisted SLR  -       Row Name 12/05/23 1434          Ankle  (Therapeutic Exercise)    Ankle AROM (Therapeutic Exercise) bilateral;dorsiflexion;plantarflexion;supine;20 repititions  -WM       Row Name             Wound 11/07/23 1240 Right anterior foot    Wound - Properties Group Placement Date: 11/07/23  - Placement Time: 1240  -FH Side: Right  -FH Orientation: anterior  -FH Location: foot  -FH    Retired Wound - Properties Group Placement Date: 11/07/23  - Placement Time: 1240  -FH Side: Right  -FH Orientation: anterior  -FH Location: foot  -FH    Retired Wound - Properties Group Date first assessed: 11/07/23  - Time first assessed: 1240  -FH Side: Right  -FH Location: foot  -FH      Row Name             Wound 11/07/23 1238 Left lower arm    Wound - Properties Group Placement Date: 11/07/23  - Placement Time: 1238  -FH Side: Left  -FH Orientation: lower  -FH Location: arm  -FH    Retired Wound - Properties Group Placement Date: 11/07/23  - Placement Time: 1238  -FH Side: Left  -FH Orientation: lower  -FH Location: arm  -FH    Retired Wound - Properties Group Date first assessed: 11/07/23  - Time first assessed: 1238  -FH Side: Left  -FH Location: arm  -FH      Row Name 12/05/23 1434          Positioning and Restraints    Pre-Treatment Position in bed  -WM     Post Treatment Position bed  -WM     In Bed fowlers;call light within reach;encouraged to call for assist  -WM       Row Name 12/05/23 1434          Progress Summary (PT)    Progress Toward Functional Goals (PT) progress toward functional goals is fair  -WM               User Key  (r) = Recorded By, (t) = Taken By, (c) = Cosigned By      Initials Name Provider Type     Carlos Cagle RN Registered Nurse    Carson Johnson PTA Physical Therapist Assistant                  Section G              Section GG                       Physical Therapy Education       Title: PT OT SLP Therapies (Done)       Topic: Physical Therapy (Done)       Point: Mobility training (Done)       Learning Progress  Summary             Patient Acceptance, E,TB, VU by  at 11/30/2023 0420    Acceptance, E,TB, VU by MOE at 11/8/2023 1341                         Point: Precautions (Done)       Learning Progress Summary             Patient Acceptance, E,TB, VU by  at 11/30/2023 0420    Acceptance, E,TB, VU by MOE at 11/8/2023 1341                                         User Key       Initials Effective Dates Name Provider Type Discipline     06/08/21 -  Bharath Berg RN Registered Nurse Nurse    MOE 06/03/21 -  Merlin Rao, PT Physical Therapist PT                  PT Recommendation and Plan     Progress Summary (PT)  Progress Toward Functional Goals (PT): progress toward functional goals is fair  Daily Progress Summary (PT): Pt required less assistance with supine to sitting transfer. Pt stood x 3 for 10-15 seconds.   Outcome Measures       Row Name 12/05/23 1443 12/04/23 1300          How much help from another person do you currently need...    Turning from your back to your side while in flat bed without using bedrails? 3  -WM 3  -CS     Moving from lying on back to sitting on the side of a flat bed without bedrails? 3  -WM 3  -CS     Moving to and from a bed to a chair (including a wheelchair)? 1  -WM 1  -CS     Standing up from a chair using your arms (e.g., wheelchair, bedside chair)? 2  -WM 2  -CS     Climbing 3-5 steps with a railing? 1  -WM 1  -CS     To walk in hospital room? 1  -WM 1  -CS     AM-PAC 6 Clicks Score (PT) 11  -WM 11  -CS     Highest Level of Mobility Goal 4 --> Transfer to chair/commode  -WM 4 --> Transfer to chair/commode  -CS        Functional Assessment    Outcome Measure Options -- AM-PAC 6 Clicks Basic Mobility (PT)  -CS               User Key  (r) = Recorded By, (t) = Taken By, (c) = Cosigned By      Initials Name Provider Type    Carson Johnson PTA Physical Therapist Assistant    Ronald Huerta PTA Physical Therapist Assistant                     Time Calculation:    PT Charges        Row Name 12/05/23 1432             Time Calculation    PT Received On 12/05/23  -WM         Timed Charges    29016 - PT Therapeutic Exercise Minutes 15  -WM      28328 - PT Therapeutic Activity Minutes 25  -WM         SNF Physical Therapy Minutes    Skilled Minutes- PT 40 min  -WM         Total Minutes    Timed Charges Total Minutes 40  -WM       Total Minutes 40  -WM                User Key  (r) = Recorded By, (t) = Taken By, (c) = Cosigned By      Initials Name Provider Type     Carson Inman PTA Physical Therapist Assistant                      PT G-Codes  Outcome Measure Options: AM-PAC 6 Clicks Daily Activity (OT), Optimal Instrument  AM-PAC 6 Clicks Score (PT): 11  AM-PAC 6 Clicks Score (OT): 15    Carson Inman PTA  12/5/2023

## 2023-12-05 NOTE — THERAPY EVALUATION
Patient Name: Jose Shaikh  : 1960    MRN: [id number]                              Today's Date: 2023       Admit Date: 2023    Visit Dx:     ICD-10-CM ICD-9-CM   1. Difficulty in walking  R26.2 719.7     Patient Active Problem List   Diagnosis    Diabetic ulcer of left heel associated with type 2 DM    Acute osteomyelitis of left calcaneus     Diabetic ulcer of left heel associated with type 2 DM    Diabetic ulcer of right midfoot associated with type 2 DM    Paroxysmal atrial fibrillation    Essential hypertension    Hyperlipidemia LDL goal <100    Cellulitis and abscess of foot    High alkaline phosphatase level    Osteomyelitis    Onychomycosis    Onychocryptosis    Foot pain, bilateral    Osteomyelitis of foot, right, acute    Cellulitis of right foot    Type 2 diabetes mellitus, with long-term current use of insulin    Class 3 severe obesity due to excess calories with serious comorbidity and body mass index (BMI) of 45.0 to 49.9 in adult    Anxiety disorder, unspecified    Claustrophobia    Dependence on wheelchair    Depression, unspecified    Long term (current) use of anticoagulants    Long term (current) use of oral hypoglycemic drugs    Wound of foot    Non-prs chronic ulcer oth prt r foot limited to brkdwn skin    Orthostatic hypotension    Other chronic osteomyelitis, right ankle and foot    Personal history of nicotine dependence    Thrombocytopenia, unspecified    Unspecified open wound, right foot, initial encounter    Diabetic foot infection    Subacute osteomyelitis of right foot    Right foot pain    Sepsis    Onychomycosis    Foot pain, left    Impaired mobility and ADLs    Absence of toe of right foot    Corns and callosity    Disability of walking    Fracture    Limb swelling    Polyneuropathy    Pressure ulcer, stage 1    Shortness of breath    Generalized weakness    Debility     Past Medical History:   Diagnosis Date    Absence of toe of right foot     Acute  osteomyelitis of left calcaneus  08/18/2021    Anxiety and depression     Arthritis     Cancer     Claustrophobia     Corns and callus     Diabetic ulcer of left heel associated with type 2 DM 08/18/2021    Diabetic ulcer of left heel associated with type 2 DM 07/06/2021    Diabetic ulcer of right midfoot associated with type 2 DM 08/18/2021    Difficulty walking     Essential hypertension 08/31/2021    Hammertoe     Hyperlipidemia LDL goal <100 08/31/2021    Ingrown toenail     Obesity     Paroxysmal atrial fibrillation 08/31/2021    Polyneuropathy     Pressure ulcer, stage 1     Tinea unguium     Type 2 diabetes mellitus with polyneuropathy      Past Surgical History:   Procedure Laterality Date    CYST REMOVAL      center of back; benign    EYE SURGERY      INCISION AND DRAINAGE ABSCESS      back    INCISION AND DRAINAGE LEG Right 12/10/2021    Procedure: INCISION AND DRAINAGE LOWER EXTREMITY;  Surgeon: Ash Leyva DPM;  Location: [facility];  Service: Podiatry;  Laterality: Right;    OTHER SURGICAL HISTORY      Surgical clips left foot    TOE SURGERY Right     Removal of 5th toe    TRANS METATARSAL AMPUTATION Right 12/02/2021    Procedure: AMPUTATION TRANS METATARSAL;  Surgeon: Ash Leyva DPM;  Location: [facility] OR;  Service: Podiatry;  Laterality: Right;    VASCULAR SURGERY      WRIST SURGERY Left     repair of injury      General Information       Row Name 12/05/23 1001          OT Time and Intention    Document Type therapy note (daily note)  -PG     Mode of Treatment individual therapy;occupational therapy  -PG               User Key  (r) = Recorded By, (t) = Taken By, (c) = Cosigned By      Initials Name Provider Type    PG Kodak Matos OT Occupational Therapist                     Mobility/ADL's       Row Name 12/05/23 1001          Transfers    Transfers sit-stand transfer;stand-sit transfer  -PG       Row Name 12/05/23 1001          Sit-Stand Transfer    Sit-Stand  [place] (Transfers) 2 person assist;verbal cues;moderate assist (50% patient effort)  -PG     Assistive Device (Sit-Stand Transfers) bariatric;walker, front-wheeled  -PG       Row Name 12/05/23 1001          Stand-Sit Transfer    Stand-Sit [place] (Transfers) moderate assist (50% patient effort);2 person assist  -PG     Assistive Device (Stand-Sit Transfers) bariatric;walker, front-wheeled  -PG               User Key  (r) = Recorded By, (t) = Taken By, (c) = Cosigned By      Initials Name Provider Type    Kodak Velazco OT Occupational Therapist                   Obj/Interventions       Row Name 12/05/23 1002          Shoulder (Therapeutic Exercise)    Shoulder (Therapeutic Exercise) strengthening exercise  -PG     Shoulder Strengthening (Therapeutic Exercise) 5 lb free weight;15 repititions;2 sets  -PG       Row Name 12/05/23 1002          Elbow/Forearm (Therapeutic Exercise)    Elbow/Forearm (Therapeutic Exercise) strengthening exercise  -PG     Elbow/Forearm Strengthening (Therapeutic Exercise) 15 repititions;2 sets;5 lb free weight  -PG       Row Name 12/05/23 1002          Motor Skills    Therapeutic Exercise shoulder;elbow/forearm  -PG               User Key  (r) = Recorded By, (t) = Taken By, (c) = Cosigned By      Initials Name Provider Type    Kodak Velazco OT Occupational Therapist                   Goals/Plan    No documentation.                  Clinical Impression       Row Name 12/05/23 1002          Plan of Care Review    Outcome Evaluation Patient participated in upper body strengthening exercises utilizing 6 pound weights, 2 sets x 15 repetitions.  Patient stood mod assist x 2 and able to advance feet 3 inches with assist to advance right foot forward and 2 others to push bed.  Continue OT services per plan of care  -PG               User Key  (r) = Recorded By, (t) = Taken By, (c) = Cosigned By      Initials Name Provider Type    Kodak Velazco OT Occupational Therapist                    Outcome Measures       Row Name 12/05/23 1006          How much help from another is currently needed...    Putting on and taking off regular lower body clothing? 1  -PG     Bathing (including washing, rinsing, and drying) 2  -PG     Toileting (which includes using toilet bed pan or urinal) 1  -PG     Putting on and taking off regular upper body clothing 3  -PG     Taking care of personal grooming (such as brushing teeth) 4  -PG     Eating meals 4  -PG     AM-PAC 6 Clicks Score (OT) 15  -PG       Row Name 12/05/23 0050          How much help from another person do you currently need...    Turning from your back to your side while in flat bed without using bedrails? 3  -FM     Moving from lying on back to sitting on the side of a flat bed without bedrails? 3  -FM     Moving to and from a bed to a chair (including a wheelchair)? 1  -FM     Standing up from a chair using your arms (e.g., wheelchair, bedside chair)? 2  -FM     Climbing 3-5 steps with a railing? 1  -FM     To walk in hospital room? 1  -FM     AM-PAC 6 Clicks Score (PT) 11  -FM     Highest Level of Mobility Goal 4 --> Transfer to chair/commode  -FM       Row Name 12/05/23 1006          Functional Assessment    Outcome Measure Options AM-PAC 6 Clicks Daily Activity (OT);Optimal Instrument  -PG       Row Name 12/05/23 1006          Optimal Instrument    Optimal Instrument Optimal - 3  -PG     Bending/Stooping 5  -PG     Standing 4  -PG     Reaching 2  -PG               User Key  (r) = Recorded By, (t) = Taken By, (c) = Cosigned By      Initials Name Provider Type    FM Patsy Cano, RN Registered Nurse    Kodak Velazco, BEKAH Occupational Therapist                    Occupational Therapy Education       Title: PT OT SLP Therapies (Done)       Topic: Occupational Therapy (Done)       Point: ADL training (Done)       Description:   Instruct learner(s) on proper safety adaptation and remediation techniques during self care or transfers.   Instruct  in proper use of assistive devices.                  Learning Progress Summary             Patient Acceptance, E,TB, VU by  at 11/30/2023 0420    Acceptance, E,D, DU by PG at 11/7/2023 0932                         Point: Home exercise program (Done)       Description:   Instruct learner(s) on appropriate technique for monitoring, assisting and/or progressing therapeutic exercises/activities.                  Learning Progress Summary             Patient Acceptance, E,TB, VU by  at 11/30/2023 0420    Acceptance, E,D, DU by PG at 11/7/2023 0932                         Point: Precautions (Done)       Description:   Instruct learner(s) on prescribed precautions during self-care and functional transfers.                  Learning Progress Summary             Patient Acceptance, E,TB, VU by  at 11/30/2023 0420    Acceptance, E,D, DU by PG at 11/7/2023 0932                         Point: Body mechanics (Done)       Description:   Instruct learner(s) on proper positioning and spine alignment during self-care, functional mobility activities and/or exercises.                  Learning Progress Summary             Patient Acceptance, E,TB, VU by  at 11/30/2023 0420    Acceptance, E,D, DU by PG at 11/7/2023 0932                                         User Key       Initials Effective Dates Name Provider Type Discipline     06/08/21 -  Bharath Berg, RN Registered Nurse Nurse     06/16/21 -  Kodak Matos OT Occupational Therapist OT                  OT Recommendation and Plan  Planned Therapy Interventions (OT): activity tolerance training, BADL retraining, strengthening exercise, transfer/mobility retraining, patient/caregiver education/training, occupation/activity based interventions  Therapy Frequency (OT): 5 times/wk  Plan of Care Review  Plan of Care Reviewed With: patient  Progress: no change  Outcome Evaluation: Patient participated in upper body strengthening exercises utilizing 6 pound weights, 2 sets x  15 repetitions.  Patient stood mod assist x 2 and able to advance feet 3 inches with assist to advance right foot forward and 2 others to push bed.  Continue OT services per plan of care     Time Calculation:   Evaluation Complexity (OT)  Review Occupational Profile/Medical/Therapy History Complexity: brief/low complexity  Assessment, Occupational Performance/Identification of Deficit Complexity: 3-5 performance deficits  Clinical Decision Making Complexity (OT): problem focused assessment/low complexity  Overall Complexity of Evaluation (OT): low complexity     Time Calculation- OT       Row Name 12/05/23 1008             Time Calculation- OT    OT Received On 12/05/23  -PG      OT Goal Re-Cert Due Date 12/27/23  -PG         Timed Charges    59812 - OT Therapeutic Exercise Minutes 15  -PG      34456 - OT Therapeutic Activity Minutes 20  -PG         SNF Occupational Therapy Minutes    Skilled Minutes- OT 35 min  -PG         Total Minutes    Timed Charges Total Minutes 35  -PG       Total Minutes 35  -PG                User Key  (r) = Recorded By, (t) = Taken By, (c) = Cosigned By      Initials Name Provider Type    PG Kodak Matos OT Occupational Therapist                  Therapy Charges for Today       Code Description Service Date Service Provider Modifiers Qty    23183963843 HC OT THERAPEUTIC ACT EA 15 MIN 12/4/2023 Kodak Matos OT GO 1    65452145548 HC OT THER PROC EA 15 MIN 12/5/2023 Kodak Matos OT GO 1    44164992720 HC OT THERAPEUTIC ACT EA 15 MIN 12/5/2023 Kodak Matos OT GO 1                 Kodak Matos OT  12/5/2023

## 2023-12-05 NOTE — SIGNIFICANT NOTE
Wound Eval / Progress Noted     Angelica     Patient Name: Jose Shaikh  : 1960  MRN: [id number]  Today's Date: 2023                 Admit Date: 2023    Visit Dx:    ICD-10-CM ICD-9-CM   1. Difficulty in walking  R26.2 719.7         Debility        Past Medical History:   Diagnosis Date    Absence of toe of right foot     Acute osteomyelitis of left calcaneus  2021    Anxiety and depression     Arthritis     Cancer     Claustrophobia     Corns and callus     Diabetic ulcer of left heel associated with type 2 DM 2021    Diabetic ulcer of left heel associated with type 2 DM 2021    Diabetic ulcer of right midfoot associated with type 2 DM 2021    Difficulty walking     Essential hypertension 2021    Hammertoe     Hyperlipidemia LDL goal <100 2021    Ingrown toenail     Obesity     Paroxysmal atrial fibrillation 2021    Polyneuropathy     Pressure ulcer, stage 1     Tinea unguium     Type 2 diabetes mellitus with polyneuropathy         Past Surgical History:   Procedure Laterality Date    CYST REMOVAL      center of back; benign    EYE SURGERY      INCISION AND DRAINAGE ABSCESS      back    INCISION AND DRAINAGE LEG Right 12/10/2021    Procedure: INCISION AND DRAINAGE LOWER EXTREMITY;  Surgeon: Ash Leyva DPM;  Location: [facility];  Service: Podiatry;  Laterality: Right;    OTHER SURGICAL HISTORY      Surgical clips left foot    TOE SURGERY Right     Removal of 5th toe    TRANS METATARSAL AMPUTATION Right 2021    Procedure: AMPUTATION TRANS METATARSAL;  Surgeon: Ash Leyva DPM;  Location: [facility];  Service: Podiatry;  Laterality: Right;    VASCULAR SURGERY      WRIST SURGERY Left     repair of injury         Physical Assessment:  Wound 23 1240 Right anterior foot (Active)   Wound Image   23 1128   Dressing Appearance dry;intact 23 1128   Closure None 23 1128   Base dry;moist;red 23  1128   Periwound dry;intact;blistered 12/05/23 1128   Periwound Temperature warm 12/05/23 1128   Periwound Skin Turgor soft 12/05/23 1128   Edges open 12/05/23 1128   Drainage Characteristics/Odor serosanguineous 12/05/23 1128   Drainage Amount scant 12/05/23 1128   Care, Wound cleansed with;sterile normal saline 12/05/23 1128   Dressing Care dressing applied;petroleum-based;non-adherent;gauze;gauze, dry 12/05/23 1128   Periwound Care absorptive dressing applied 12/05/23 1128       Wound 11/07/23 1238 Left lower arm (Active)   Wound Image   12/05/23 1128   Dressing Appearance dry;intact 12/05/23 1128   Closure None 12/05/23 1128   Base moist;pink;red 12/05/23 1128   Periwound dry;intact;pink 12/05/23 1128   Periwound Temperature warm 12/05/23 1128   Periwound Skin Turgor soft 12/05/23 1128   Edges open 12/05/23 1128   Drainage Characteristics/Odor serous 12/05/23 1128   Drainage Amount scant 12/05/23 1128      Wound Check / Follow-up: Patient seen today for wound follow-up. Patient remains in skilled nursing facility for rehab. Remains on bariatric bed and mattress.    Traumatic injury to right distal foot/residual limb continues to improve. Small open wound with red moist tissue is present. There is a darkened discoloration with callus noted to periwound tissue. There is a small blood filled blister noted to right of wound. Cleansed all areas with normal saline and gauze. Applied non-adherent petroleum based gauze and covered with dry gauze then secured with gauze roll. Recommending to continue current wound care.    Scabbing resolved to left toes at this time with pink, intact tissue present.    Skin tear to left lower arm improving with small open area with moist red and pink tissue. Peeled and resealed dressing. Recommending to continue to follow skin tear protocol.    Moisture and redness remain within skin folds and gluteal aspects/ gluteal crease. Recommending to continue skin protection and topical treatment  with application of barrier cream.      Impression: Traumatic injury. Skin tear. Moisture and redness.      Short term goals: Regain skin integrity, skin protection, pressure reduction, moisture prevention/management, daily dressing changes, skin tear protocol, topical treatment, skin care.       Dottie Guevara RN    12/5/2023    13:21 EST

## 2023-12-06 ENCOUNTER — APPOINTMENT (OUTPATIENT)
Dept: GENERAL RADIOLOGY | Facility: HOSPITAL | Age: 63
DRG: 948 | End: 2023-12-06
Payer: MEDICARE

## 2023-12-06 LAB
GLUCOSE BLDC GLUCOMTR-MCNC: 112 MG/DL (ref 70–99)
GLUCOSE BLDC GLUCOMTR-MCNC: 151 MG/DL (ref 70–99)
GLUCOSE BLDC GLUCOMTR-MCNC: 164 MG/DL (ref 70–99)
GLUCOSE BLDC GLUCOMTR-MCNC: 86 MG/DL (ref 70–99)

## 2023-12-06 PROCEDURE — 97110 THERAPEUTIC EXERCISES: CPT

## 2023-12-06 PROCEDURE — 63710000001 INSULIN DETEMIR PER 5 UNITS: Performed by: PHYSICIAN ASSISTANT

## 2023-12-06 PROCEDURE — 82948 REAGENT STRIP/BLOOD GLUCOSE: CPT

## 2023-12-06 PROCEDURE — 71045 X-RAY EXAM CHEST 1 VIEW: CPT

## 2023-12-06 PROCEDURE — 99308 SBSQ NF CARE LOW MDM 20: CPT | Performed by: PHYSICIAN ASSISTANT

## 2023-12-06 PROCEDURE — 63710000001 INSULIN LISPRO (HUMAN) PER 5 UNITS: Performed by: INTERNAL MEDICINE

## 2023-12-06 RX ADMIN — INSULIN DETEMIR 60 UNITS: 100 INJECTION, SOLUTION SUBCUTANEOUS at 20:11

## 2023-12-06 RX ADMIN — LISINOPRIL 2.5 MG: 2.5 TABLET ORAL at 08:13

## 2023-12-06 RX ADMIN — Medication 250 MG: at 08:14

## 2023-12-06 RX ADMIN — HYDROCORTISONE 1 APPLICATION: 1 OINTMENT TOPICAL at 16:11

## 2023-12-06 RX ADMIN — PREGABALIN 100 MG: 100 CAPSULE ORAL at 16:11

## 2023-12-06 RX ADMIN — FERROUS SULFATE TAB 325 MG (65 MG ELEMENTAL FE) 325 MG: 325 (65 FE) TAB at 08:14

## 2023-12-06 RX ADMIN — APIXABAN 5 MG: 5 TABLET, FILM COATED ORAL at 20:11

## 2023-12-06 RX ADMIN — OXYCODONE AND ACETAMINOPHEN 1 TABLET: 7.5; 325 TABLET ORAL at 04:30

## 2023-12-06 RX ADMIN — INSULIN LISPRO 4 UNITS: 100 INJECTION, SOLUTION INTRAVENOUS; SUBCUTANEOUS at 12:09

## 2023-12-06 RX ADMIN — OXYCODONE AND ACETAMINOPHEN 1 TABLET: 7.5; 325 TABLET ORAL at 10:31

## 2023-12-06 RX ADMIN — PREGABALIN 100 MG: 100 CAPSULE ORAL at 20:10

## 2023-12-06 RX ADMIN — HYDROCORTISONE 1 APPLICATION: 1 OINTMENT TOPICAL at 08:15

## 2023-12-06 RX ADMIN — Medication 250 MG: at 20:10

## 2023-12-06 RX ADMIN — APIXABAN 5 MG: 5 TABLET, FILM COATED ORAL at 08:14

## 2023-12-06 RX ADMIN — INSULIN DETEMIR 70 UNITS: 100 INJECTION, SOLUTION SUBCUTANEOUS at 08:13

## 2023-12-06 RX ADMIN — OXYCODONE AND ACETAMINOPHEN 1 TABLET: 7.5; 325 TABLET ORAL at 19:11

## 2023-12-06 RX ADMIN — ATORVASTATIN CALCIUM 10 MG: 10 TABLET, FILM COATED ORAL at 20:11

## 2023-12-06 RX ADMIN — CYANOCOBALAMIN TAB 500 MCG 1000 MCG: 500 TAB at 08:14

## 2023-12-06 RX ADMIN — HYDROCORTISONE 1 APPLICATION: 1 OINTMENT TOPICAL at 20:12

## 2023-12-06 RX ADMIN — INSULIN LISPRO 4 UNITS: 100 INJECTION, SOLUTION INTRAVENOUS; SUBCUTANEOUS at 20:12

## 2023-12-06 RX ADMIN — EMPAGLIFLOZIN 10 MG: 10 TABLET, FILM COATED ORAL at 08:14

## 2023-12-06 RX ADMIN — PREGABALIN 100 MG: 100 CAPSULE ORAL at 08:14

## 2023-12-06 NOTE — PLAN OF CARE
Goal Outcome Evaluation:           Progress: no change       Rsd. Is alert and oriented x4, able to make needs known to staff.  Rsd. Has assisted this RN with performing yonis-care this shift, but states he is unable to reach backside to help.  Rsd. Has used overhead trapeze bar to reposition self and pull up in bed.   Rsd. Has not attempted to get out of bed this shift, bedpan utilized.  Rsd. Max assist 2+ with transfer using gaitbelt and rolling walker.  Bed/chair alerts remain in place.  Weight obtained this shift but not accurate, attempted reweigh, but staff unable to get out of bed this shift to zero bed.  Will notify on-coming staff that when therapy stands at bedside, bed needs to be zeroed out and weight obtained.  Call light and personal items within reach, Rsd. Reminded to use call light for assistance, verbalizes understanding.  Will continue to monitor and notify on-coming staff.  Current plan of care remains in place at this time.

## 2023-12-06 NOTE — THERAPY TREATMENT NOTE
SNF - Physical Therapy Treatment Note  KEO Dominguez     Patient Name: Jose Shaikh  : 1960  MRN: [id number]  Today's Date: 2023      Visit Dx:    ICD-10-CM ICD-9-CM   1. Difficulty in walking  R26.2 719.7     Patient Active Problem List   Diagnosis    Diabetic ulcer of left heel associated with type 2 DM    Acute osteomyelitis of left calcaneus     Diabetic ulcer of left heel associated with type 2 DM    Diabetic ulcer of right midfoot associated with type 2 DM    Paroxysmal atrial fibrillation    Essential hypertension    Hyperlipidemia LDL goal <100    Cellulitis and abscess of foot    High alkaline phosphatase level    Osteomyelitis    Onychomycosis    Onychocryptosis    Foot pain, bilateral    Osteomyelitis of foot, right, acute    Cellulitis of right foot    Type 2 diabetes mellitus, with long-term current use of insulin    Class 3 severe obesity due to excess calories with serious comorbidity and body mass index (BMI) of 45.0 to 49.9 in adult    Anxiety disorder, unspecified    Claustrophobia    Dependence on wheelchair    Depression, unspecified    Long term (current) use of anticoagulants    Long term (current) use of oral hypoglycemic drugs    Wound of foot    Non-prs chronic ulcer oth prt r foot limited to brkdwn skin    Orthostatic hypotension    Other chronic osteomyelitis, right ankle and foot    Personal history of nicotine dependence    Thrombocytopenia, unspecified    Unspecified open wound, right foot, initial encounter    Diabetic foot infection    Subacute osteomyelitis of right foot    Right foot pain    Sepsis    Onychomycosis    Foot pain, left    Impaired mobility and ADLs    Absence of toe of right foot    Corns and callosity    Disability of walking    Fracture    Limb swelling    Polyneuropathy    Pressure ulcer, stage 1    Shortness of breath    Generalized weakness    Debility     Past Medical History:   Diagnosis Date    Absence of toe of right foot     Acute osteomyelitis of  left calcaneus  08/18/2021    Anxiety and depression     Arthritis     Cancer     Claustrophobia     Corns and callus     Diabetic ulcer of left heel associated with type 2 DM 08/18/2021    Diabetic ulcer of left heel associated with type 2 DM 07/06/2021    Diabetic ulcer of right midfoot associated with type 2 DM 08/18/2021    Difficulty walking     Essential hypertension 08/31/2021    Hammertoe     Hyperlipidemia LDL goal <100 08/31/2021    Ingrown toenail     Obesity     Paroxysmal atrial fibrillation 08/31/2021    Polyneuropathy     Pressure ulcer, stage 1     Tinea unguium     Type 2 diabetes mellitus with polyneuropathy      Past Surgical History:   Procedure Laterality Date    CYST REMOVAL      center of back; benign    EYE SURGERY      INCISION AND DRAINAGE ABSCESS      back    INCISION AND DRAINAGE LEG Right 12/10/2021    Procedure: INCISION AND DRAINAGE LOWER EXTREMITY;  Surgeon: Ash Leyva DPM;  Location: [facility];  Service: Podiatry;  Laterality: Right;    OTHER SURGICAL HISTORY      Surgical clips left foot    TOE SURGERY Right     Removal of 5th toe    TRANS METATARSAL AMPUTATION Right 12/02/2021    Procedure: AMPUTATION TRANS METATARSAL;  Surgeon: Ash Leyva DPM;  Location: [facility];  Service: Podiatry;  Laterality: Right;    VASCULAR SURGERY      WRIST SURGERY Left     repair of injury       PT Assessment (last 12 hours)       PT Evaluation and Treatment       Row Name 12/06/23 1014          Physical Therapy Time and Intention    Subjective Information complains of;pain  -WM     Document Type therapy note (daily note)  -WM     Mode of Treatment individual therapy;physical therapy  -WM     Patient Effort adequate  -WM     Symptoms Noted During/After Treatment fatigue;increased pain  -WM       Row Name 12/06/23 1014          Pain Scale: FACES Pre/Post-Treatment    Pain: FACES Scale, Pretreatment 2-->hurts little bit  -WM     Posttreatment Pain Rating 4-->hurts  little more  -     Pain Location - Side/Orientation Left  -     Pain Location - hip;knee  -       Row Name 12/06/23 1014          Hip (Therapeutic Exercise)    Hip AAROM (Therapeutic Exercise) bilateral;aBduction;aDduction;supine;10 repetitions;2 sets  -     Hip Isometrics (Therapeutic Exercise) bilateral;gluteal sets;supine;10 repetitions;3 second hold;2 sets  -       Row Name 12/06/23 1014          Knee (Therapeutic Exercise)    Knee Isometrics (Therapeutic Exercise) bilateral;quad sets;supine;10 repetitions;3 second hold;2 sets  -     Knee Strengthening (Therapeutic Exercise) bilateral;SLR (straight leg raise);SAQ (short arc quad);supine;4 lb free weight;20 repititions  4 lb wt used with SAQ only, SLR   active-assisted  -       Row Name 12/06/23 1014          Ankle (Therapeutic Exercise)    Ankle AROM (Therapeutic Exercise) bilateral;dorsiflexion;plantarflexion;supine;20 repititions  -       Row Name             Wound 11/07/23 1240 Right anterior foot    Wound - Properties Group Placement Date: 11/07/23  - Placement Time: 1240  -FH Side: Right  -FH Orientation: anterior  -FH Location: foot  -FH    Retired Wound - Properties Group Placement Date: 11/07/23  - Placement Time: 1240  -FH Side: Right  -FH Orientation: anterior  -FH Location: foot  -FH    Retired Wound - Properties Group Date first assessed: 11/07/23  - Time first assessed: 1240  -FH Side: Right  -FH Location: foot  -FH      Row Name             Wound 11/07/23 1238 Left lower arm    Wound - Properties Group Placement Date: 11/07/23  - Placement Time: 1238  -FH Side: Left  -FH Orientation: lower  -FH Location: arm  -FH    Retired Wound - Properties Group Placement Date: 11/07/23  - Placement Time: 1238  -FH Side: Left  -FH Orientation: lower  -FH Location: arm  -FH    Retired Wound - Properties Group Date first assessed: 11/07/23  - Time first assessed: 1238  -FH Side: Left  -FH Location: arm  -FH      Row Name 12/06/23 1014           Positioning and Restraints    Pre-Treatment Position in bed  -WM     Post Treatment Position bed  -WM     In Bed fowlers;call light within reach;encouraged to call for assist  -WM       Row Name 12/06/23 1014          Progress Summary (PT)    Progress Toward Functional Goals (PT) progress toward functional goals is fair  -WM     Daily Progress Summary (PT) Pt participated in BLE exefcises, but declined further therapy.  -WM               User Key  (r) = Recorded By, (t) = Taken By, (c) = Cosigned By      Initials Name Provider Type     Carlos Cagle, RN Registered Nurse    Carson Johnson PTA Physical Therapist Assistant                  Section G              Section GG                       Physical Therapy Education       Title: PT OT SLP Therapies (Done)       Topic: Physical Therapy (Done)       Point: Mobility training (Done)       Learning Progress Summary             Patient Acceptance, E,TB, VU by  at 11/30/2023 0420    Acceptance, E,TB, VU by MOE at 11/8/2023 1341                         Point: Precautions (Done)       Learning Progress Summary             Patient Acceptance, E,TB, VU by  at 11/30/2023 0420    Acceptance, E,TB, VU by MOE at 11/8/2023 1341                                         User Key       Initials Effective Dates Name Provider Type Discipline     06/08/21 -  Bharath Berg, RN Registered Nurse Nurse    MOE 06/03/21 -  Merlin Rao, PT Physical Therapist PT                  PT Recommendation and Plan     Progress Summary (PT)  Progress Toward Functional Goals (PT): progress toward functional goals is fair  Daily Progress Summary (PT): Pt participated in BLE exefcises, but declined further therapy.   Outcome Measures       Row Name 12/06/23 1019 12/05/23 1443 12/04/23 1300       How much help from another person do you currently need...    Turning from your back to your side while in flat bed without using bedrails? 3  -WM 3  -WM 3  -CS    Moving from lying on  back to sitting on the side of a flat bed without bedrails? 3  -WM 3  -WM 3  -CS    Moving to and from a bed to a chair (including a wheelchair)? 1  -WM 1  -WM 1  -CS    Standing up from a chair using your arms (e.g., wheelchair, bedside chair)? 2  -WM 2  -WM 2  -CS    Climbing 3-5 steps with a railing? 1  -WM 1  -WM 1  -CS    To walk in hospital room? 1  -WM 1  -WM 1  -CS    AM-PAC 6 Clicks Score (PT) 11  -WM 11  -WM 11  -CS    Highest Level of Mobility Goal 4 --> Transfer to chair/commode  -WM 4 --> Transfer to chair/commode  -WM 4 --> Transfer to chair/commode  -CS       Functional Assessment    Outcome Measure Options -- -- AM-PAC 6 Clicks Basic Mobility (PT)  -CS              User Key  (r) = Recorded By, (t) = Taken By, (c) = Cosigned By      Initials Name Provider Type     Carson Inman PTA Physical Therapist Assistant     Ronald Fabian PTA Physical Therapist Assistant                     Time Calculation:    PT Charges       Row Name 12/06/23 1014             Time Calculation    PT Received On 12/06/23  -WM         Timed Charges    48342 - PT Therapeutic Exercise Minutes 16  -WM         SNF Physical Therapy Minutes    Skilled Minutes- PT 16 min  -WM         Total Minutes    Timed Charges Total Minutes 16  -WM       Total Minutes 16  -WM                User Key  (r) = Recorded By, (t) = Taken By, (c) = Cosigned By      Initials Name Provider Type     Carson Inman PTA Physical Therapist Assistant                  Therapy Charges for Today       Code Description Service Date Service Provider Modifiers Qty    27470836619 HC PT THER PROC EA 15 MIN 12/5/2023 Carson Inman PTA GP 1    05864668089 HC PT THERAPEUTIC ACT EA 15 MIN 12/5/2023 Carson Inman PTA GP 2            PT G-Codes  Outcome Measure Options: AM-PAC 6 Clicks Daily Activity (OT), Optimal Instrument  AM-PAC 6 Clicks Score (PT): 11  AM-PAC 6 Clicks Score (OT): 15    Carson Inman PTA  12/6/2023

## 2023-12-06 NOTE — PLAN OF CARE
Goal Outcome Evaluation:              Outcome Evaluation: Plan of care continues at this time with patient to work with therapy. Patient is alert and oriented and able to make needs known. Patient refuses assist with repositioning at times, patient uses overhead trapeze at times to reposition self. Patient educated on importance of repositioning to alleviate pressure. Patient uses urinal and bedpan. See eMAR for medication administration details, medicated for pain PRN. Call light in reach.

## 2023-12-06 NOTE — PROGRESS NOTES
[facility]   Hospitalist Progress Note  Date: 2023  Patient Name: Jose Shaikh  : 1960  MRN: [id number]  Date of admission: 2023      Subjective   Subjective     Chief Complaint: Weakness    Summary: Jose Shaikh is a 63 y.o. male  initially hospitalized on 9/10/2023, prolonged hospitalization for treatment of management of generalized weakness, deconditioning, with acute issues of diarrhea, C. difficile negative.  Diarrhea improved.  Had small hematoma after ambulating on his foot.  Unfortunately with exhaustive efforts to try to place this gentleman after 39 days of hospitalization, we are unable to find a facility that will accept him.  He has no further acute needs or requirements for inpatient monitoring and management, his labs and vitals are stable, he is tolerating oral intake, and has been refusing turns and repositionings and other interventions with nursing staff despite recommendations.  Patient discharged in hemodynamically stable condition on 10/19/2023 to follow-up with PCP within 1 week. Unfortunately we cannot solve all of his social issues during this hospitalization due to lack of resources and participation from the patient's perspective despite all our efforts.  Patient has a financial means of making arrangements at home.  Patient appealed his discharge.  Lost his appeal.  LCD: 10/20/23, PFL beginning: 10/21/23 @ 12pm.  Due to an inability to place the patient in a.m. nursing home he has been placed in our skilled nursing facility until arrangements can be made or until he is able to care for himself.      Interval Followup: Seen and examined resting comfortably nursing staff reports sore throat earlier today but patient states this is since resolved also evidently was having some cough shortness of air chest x-ray negative we will recheck labs tomorrow otherwise patient states he is doing well and without complaints at the time of my exam and questioning.    Review of  systems: All systems reviewed negative septa following: Patient complains of generalized weakness fatigue and diarrhea  Objective   Objective     Vitals:   Temp:  [97.7 °F (36.5 °C)-97.9 °F (36.6 °C)] 97.7 °F (36.5 °C)  Heart Rate:  [81-82] 82  Resp:  [16] 16  BP: (114-123)/(58) 123/58    Physical Exam   Constitutional: Awake alert oriented no acute distress  Respiratory: No wheeze  GI: Abdomen: Obese  Neuro/psych:  Calm mood.  No dysarthria.    Result Review    Result Review:  I have personally reviewed the results from the time of this admission to 12/6/2023 17:46 EST and agree with these findings:  []  Laboratory  []  Microbiology  []  Radiology  []  EKG/Telemetry   []  Cardiology/Vascular   []  Pathology  []  Old records  []  Other:    Assessment & Plan   Assessment / Plan   Assessment:  Hospital-acquired weakness  C. difficile diarrhea  Generalized weakness  Deconditioning  DM (Kami Garcia and PCP)  HTN  PAF (on Eliquis; previously seen Dr. Duval)  Morbid obesity with BMI 44  Debility with wheelchair dependence  Hx of severe left hip pain  Severe degenerative joint disease of lower extremities  Hx L calcaneus osteomyelitis  Hx R transmetatarsal  Chronic bilateral foot wounds with right transmetatarsal amputation, healing by secondary intention (wound care clinic; podiatrist Gordon)  Thrombocytopenia, resolved      Plan:    Continue basal insulin and SSI per protocol titrate as needed  Check chest x-ray negative  Repeat labs in a.m.  Continue Eliquis for stroke prophylaxis  Continue daily PT and OT  Continue current pain meds   Completed course of oral vancomycin for C difficile associated diarrhea.    Continue probiotics  Monitor hemodynamics and avoid hypotension/dehydration.  Continue other treatment as ordered  Discussed with RN    DVT prophylaxis:  Medical DVT prophylaxis orders are present.    CODE STATUS:   Code Status (Patient has no pulse and is not breathing): CPR (Attempt to Resuscitate)  Medical  Interventions (Patient has pulse or is breathing): Full Support     40433 Exp Problem Focused - Mod. Complex

## 2023-12-07 LAB
ALBUMIN SERPL-MCNC: 3.1 G/DL (ref 3.5–5.2)
ANION GAP SERPL CALCULATED.3IONS-SCNC: 8.5 MMOL/L (ref 5–15)
BUN SERPL-MCNC: 17 MG/DL (ref 8–23)
BUN/CREAT SERPL: 36.2 (ref 7–25)
CALCIUM SPEC-SCNC: 8.3 MG/DL (ref 8.6–10.5)
CHLORIDE SERPL-SCNC: 105 MMOL/L (ref 98–107)
CO2 SERPL-SCNC: 22.5 MMOL/L (ref 22–29)
CREAT SERPL-MCNC: 0.47 MG/DL (ref 0.76–1.27)
DEPRECATED RDW RBC AUTO: 58.3 FL (ref 37–54)
EGFRCR SERPLBLD CKD-EPI 2021: 115.3 ML/MIN/1.73
ERYTHROCYTE [DISTWIDTH] IN BLOOD BY AUTOMATED COUNT: 21.5 % (ref 12.3–15.4)
GLUCOSE BLDC GLUCOMTR-MCNC: 114 MG/DL (ref 70–99)
GLUCOSE BLDC GLUCOMTR-MCNC: 156 MG/DL (ref 70–99)
GLUCOSE BLDC GLUCOMTR-MCNC: 198 MG/DL (ref 70–99)
GLUCOSE BLDC GLUCOMTR-MCNC: 258 MG/DL (ref 70–99)
GLUCOSE BLDC GLUCOMTR-MCNC: 290 MG/DL (ref 70–99)
GLUCOSE SERPL-MCNC: 119 MG/DL (ref 65–99)
HCT VFR BLD AUTO: 35.4 % (ref 37.5–51)
HGB BLD-MCNC: 10.4 G/DL (ref 13–17.7)
MCH RBC QN AUTO: 22.8 PG (ref 26.6–33)
MCHC RBC AUTO-ENTMCNC: 29.4 G/DL (ref 31.5–35.7)
MCV RBC AUTO: 77.5 FL (ref 79–97)
PHOSPHATE SERPL-MCNC: 4 MG/DL (ref 2.5–4.5)
PLATELET # BLD AUTO: 83 10*3/MM3 (ref 140–450)
PMV BLD AUTO: 11.5 FL (ref 6–12)
POTASSIUM SERPL-SCNC: 3.9 MMOL/L (ref 3.5–5.2)
RBC # BLD AUTO: 4.57 10*6/MM3 (ref 4.14–5.8)
SODIUM SERPL-SCNC: 136 MMOL/L (ref 136–145)
WBC NRBC COR # BLD AUTO: 4.56 10*3/MM3 (ref 3.4–10.8)

## 2023-12-07 PROCEDURE — 85027 COMPLETE CBC AUTOMATED: CPT | Performed by: PHYSICIAN ASSISTANT

## 2023-12-07 PROCEDURE — 97530 THERAPEUTIC ACTIVITIES: CPT

## 2023-12-07 PROCEDURE — 63710000001 INSULIN LISPRO (HUMAN) PER 5 UNITS: Performed by: INTERNAL MEDICINE

## 2023-12-07 PROCEDURE — 82948 REAGENT STRIP/BLOOD GLUCOSE: CPT

## 2023-12-07 PROCEDURE — 97164 PT RE-EVAL EST PLAN CARE: CPT

## 2023-12-07 PROCEDURE — 80069 RENAL FUNCTION PANEL: CPT | Performed by: PHYSICIAN ASSISTANT

## 2023-12-07 PROCEDURE — 63710000001 INSULIN DETEMIR PER 5 UNITS: Performed by: PHYSICIAN ASSISTANT

## 2023-12-07 PROCEDURE — 97110 THERAPEUTIC EXERCISES: CPT

## 2023-12-07 RX ADMIN — INSULIN LISPRO 4 UNITS: 100 INJECTION, SOLUTION INTRAVENOUS; SUBCUTANEOUS at 08:19

## 2023-12-07 RX ADMIN — OXYCODONE AND ACETAMINOPHEN 1 TABLET: 7.5; 325 TABLET ORAL at 00:34

## 2023-12-07 RX ADMIN — INSULIN LISPRO 4 UNITS: 100 INJECTION, SOLUTION INTRAVENOUS; SUBCUTANEOUS at 21:04

## 2023-12-07 RX ADMIN — APIXABAN 5 MG: 5 TABLET, FILM COATED ORAL at 08:20

## 2023-12-07 RX ADMIN — LISINOPRIL 2.5 MG: 2.5 TABLET ORAL at 08:19

## 2023-12-07 RX ADMIN — PREGABALIN 100 MG: 100 CAPSULE ORAL at 16:55

## 2023-12-07 RX ADMIN — EMPAGLIFLOZIN 10 MG: 10 TABLET, FILM COATED ORAL at 08:20

## 2023-12-07 RX ADMIN — INSULIN DETEMIR 60 UNITS: 100 INJECTION, SOLUTION SUBCUTANEOUS at 21:04

## 2023-12-07 RX ADMIN — Medication 250 MG: at 21:04

## 2023-12-07 RX ADMIN — PREGABALIN 100 MG: 100 CAPSULE ORAL at 21:05

## 2023-12-07 RX ADMIN — OXYCODONE AND ACETAMINOPHEN 1 TABLET: 7.5; 325 TABLET ORAL at 15:02

## 2023-12-07 RX ADMIN — APIXABAN 5 MG: 5 TABLET, FILM COATED ORAL at 21:04

## 2023-12-07 RX ADMIN — OXYCODONE AND ACETAMINOPHEN 1 TABLET: 7.5; 325 TABLET ORAL at 08:20

## 2023-12-07 RX ADMIN — HYDROCORTISONE 1 APPLICATION: 1 OINTMENT TOPICAL at 08:19

## 2023-12-07 RX ADMIN — HYDROCORTISONE 1 APPLICATION: 1 OINTMENT TOPICAL at 16:56

## 2023-12-07 RX ADMIN — FERROUS SULFATE TAB 325 MG (65 MG ELEMENTAL FE) 325 MG: 325 (65 FE) TAB at 08:20

## 2023-12-07 RX ADMIN — CYANOCOBALAMIN TAB 500 MCG 1000 MCG: 500 TAB at 08:20

## 2023-12-07 RX ADMIN — Medication 10 MG: at 00:33

## 2023-12-07 RX ADMIN — INSULIN LISPRO 12 UNITS: 100 INJECTION, SOLUTION INTRAVENOUS; SUBCUTANEOUS at 17:55

## 2023-12-07 RX ADMIN — INSULIN DETEMIR 70 UNITS: 100 INJECTION, SOLUTION SUBCUTANEOUS at 08:19

## 2023-12-07 RX ADMIN — ATORVASTATIN CALCIUM 10 MG: 10 TABLET, FILM COATED ORAL at 21:05

## 2023-12-07 RX ADMIN — OXYCODONE AND ACETAMINOPHEN 1 TABLET: 7.5; 325 TABLET ORAL at 21:05

## 2023-12-07 RX ADMIN — PREGABALIN 100 MG: 100 CAPSULE ORAL at 08:19

## 2023-12-07 RX ADMIN — HYDROCORTISONE 1 APPLICATION: 1 OINTMENT TOPICAL at 21:05

## 2023-12-07 RX ADMIN — Medication 250 MG: at 08:20

## 2023-12-07 NOTE — PLAN OF CARE
Goal Outcome Evaluation:           Progress: no change  Outcome Evaluation: Patient is alert and oriented x4. Refused bath for therapy, CNA and nurse throughout shift. Pericare provided by staff for bowel and bladder incontinence. Refused turns on many occassions this shift. Would occassionally use trapeze bar to straighten himself in the bed. C/O sore throat and pain in right side when breathing. Concerns reported to provider. CXR ordered. See results view. Patient remains afebrile with O2 sats 98 to 100% on room air. Lungs sound clear. No cough present. Voices needs. Given PRN Perocet at 1031 and again at 1911 for c/o right hip and back pain. Med effective. Con't current POC.

## 2023-12-07 NOTE — PLAN OF CARE
"Goal Outcome Evaluation:  Plan of Care Reviewed With: patient        Progress: no change  Outcome Evaluation: rsident alert, oriented, able to make needs known to staff. Remains bedbound and refuses to turn. agitated and angry today. cursing, talking badly about staff and other team members calling them \"dumb asses\" etc, asked resident why he was so agitated today, but resident did not give answer. Assured resident that staff is doing their best to give him good care. Blood glucose elevated at BF and lunch, covered with sliding scale. medicated for prn pain x2, effective. Remains agitated for most of the shift. continue current plan of care.         "

## 2023-12-07 NOTE — THERAPY TREATMENT NOTE
SNF - Physical Therapy Treatment Note  KE[facility] Dominguez     Patient Name: Jose Shaikh  : 1960  MRN: [id number]  Today's Date: 2023      Visit Dx:    ICD-10-CM ICD-9-CM   1. Difficulty in walking  R26.2 719.7     Patient Active Problem List   Diagnosis    Diabetic ulcer of left heel associated with type 2 DM    Acute osteomyelitis of left calcaneus     Diabetic ulcer of left heel associated with type 2 DM    Diabetic ulcer of right midfoot associated with type 2 DM    Paroxysmal atrial fibrillation    Essential hypertension    Hyperlipidemia LDL goal <100    Cellulitis and abscess of foot    High alkaline phosphatase level    Osteomyelitis    Onychomycosis    Onychocryptosis    Foot pain, bilateral    Osteomyelitis of foot, right, acute    Cellulitis of right foot    Type 2 diabetes mellitus, with long-term current use of insulin    Class 3 severe obesity due to excess calories with serious comorbidity and body mass index (BMI) of 45.0 to 49.9 in adult    Anxiety disorder, unspecified    Claustrophobia    Dependence on wheelchair    Depression, unspecified    Long term (current) use of anticoagulants    Long term (current) use of oral hypoglycemic drugs    Wound of foot    Non-prs chronic ulcer oth prt r foot limited to brkdwn skin    Orthostatic hypotension    Other chronic osteomyelitis, right ankle and foot    Personal history of nicotine dependence    Thrombocytopenia, unspecified    Unspecified open wound, right foot, initial encounter    Diabetic foot infection    Subacute osteomyelitis of right foot    Right foot pain    Sepsis    Onychomycosis    Foot pain, left    Impaired mobility and ADLs    Absence of toe of right foot    Corns and callosity    Disability of walking    Fracture    Limb swelling    Polyneuropathy    Pressure ulcer, stage 1    Shortness of breath    Generalized weakness    Debility     Past Medical History:   Diagnosis Date    Absence of toe of right foot     Acute osteomyelitis of  left calcaneus  08/18/2021    Anxiety and depression     Arthritis     Cancer     Claustrophobia     Corns and callus     Diabetic ulcer of left heel associated with type 2 DM 08/18/2021    Diabetic ulcer of left heel associated with type 2 DM 07/06/2021    Diabetic ulcer of right midfoot associated with type 2 DM 08/18/2021    Difficulty walking     Essential hypertension 08/31/2021    Hammertoe     Hyperlipidemia LDL goal <100 08/31/2021    Ingrown toenail     Obesity     Paroxysmal atrial fibrillation 08/31/2021    Polyneuropathy     Pressure ulcer, stage 1     Tinea unguium     Type 2 diabetes mellitus with polyneuropathy      Past Surgical History:   Procedure Laterality Date    CYST REMOVAL      center of back; benign    EYE SURGERY      INCISION AND DRAINAGE ABSCESS      back    INCISION AND DRAINAGE LEG Right 12/10/2021    Procedure: INCISION AND DRAINAGE LOWER EXTREMITY;  Surgeon: Ash Leyva DPM;  Location: [facility];  Service: Podiatry;  Laterality: Right;    OTHER SURGICAL HISTORY      Surgical clips left foot    TOE SURGERY Right     Removal of 5th toe    TRANS METATARSAL AMPUTATION Right 12/02/2021    Procedure: AMPUTATION TRANS METATARSAL;  Surgeon: Ash Leyva DPM;  Location: [facility];  Service: Podiatry;  Laterality: Right;    VASCULAR SURGERY      WRIST SURGERY Left     repair of injury       PT Assessment (last 12 hours)       PT Evaluation and Treatment       Row Name 12/07/23 1117 12/07/23 0900       Physical Therapy Time and Intention    Subjective Information complains of;pain  -WM no complaints  -MOE    Document Type therapy note (daily note)  -WM re-evaluation  -MOE    Mode of Treatment individual therapy;physical therapy  -WM individual therapy;physical therapy  -MOE    Patient Effort adequate  -WM adequate  -MOE    Symptoms Noted During/After Treatment fatigue;increased pain  -WM --      Row Name 12/07/23 1117          Pain Scale: FACES Pre/Post-Treatment     Pain: FACES Scale, Pretreatment 2-->hurts little bit  -WM     Posttreatment Pain Rating 6-->hurts even more  -WM     Pain Location - Side/Orientation Left  -WM     Pain Location - hip;knee  -WM       Row Name 12/07/23 0900          Range of Motion (ROM)    Range of Motion ROM is WFL  -MOE       Row Name 12/07/23 0900          Strength (Manual Muscle Testing)    Strength (Manual Muscle Testing) bilateral lower extremities  RLE 3+/5; LLE 2/5  -MOE       Row Name 12/07/23 1117 12/07/23 0900       Bed Mobility    All Activities, [place] (Bed Mobility) -- standby assist  -MOE    Supine-Sit [place] (Bed Mobility) -- moderate assist (50% patient effort);verbal cues  -MOE    Supine-Sit-Supine [place] (Bed Mobility) moderate assist (50% patient effort);1 person assist;1 person to manage equipment  -WM --    Bed Mobility, Safety Issues decreased use of legs for bridging/pushing  -WM --    Assistive Device (Bed Mobility) bed rails;overhead trapeze  -WM --      Row Name 12/07/23 1117 12/07/23 0900       Sit-Stand Transfer    Sit-Stand [place] (Transfers) moderate assist (50% patient effort);2 person assist  -WM moderate assist (50% patient effort);2 person assist;verbal cues  -MOE    Assistive Device (Sit-Stand Transfers) bariatric;walker, front-wheeled  -WM --      Row Name 12/07/23 1117          Stand-Sit Transfer    Stand-Sit [place] (Transfers) moderate assist (50% patient effort);2 person assist  -WM     Assistive Device (Stand-Sit Transfers) bariatric;walker, front-wheeled  -WM       Row Name 12/07/23 1117 12/07/23 0900       Safety Issues, Functional Mobility    Impairments Affecting Function (Mobility) balance;endurance/activity tolerance;pain;range of motion (ROM);strength  -WM balance;endurance/activity tolerance;pain;range of motion (ROM);strength  -MOE      Row Name 12/07/23 1117          Hip (Therapeutic Exercise)    Hip AAROM (Therapeutic Exercise) bilateral;aBduction;aDduction;supine;10  repetitions;2 sets  -     Hip Isometrics (Therapeutic Exercise) bilateral;gluteal sets;supine;10 repetitions;3 second hold;2 sets  -WM       Row Name 12/07/23 1117          Knee (Therapeutic Exercise)    Knee Isometrics (Therapeutic Exercise) bilateral;quad sets;supine;10 repetitions;3 second hold;2 sets  -WM     Knee Strengthening (Therapeutic Exercise) bilateral;SAQ (short arc quad);supine;4 lb free weight;20 repititions  -WM       Row Name 12/07/23 1117          Ankle (Therapeutic Exercise)    Ankle AROM (Therapeutic Exercise) bilateral;dorsiflexion;plantarflexion;supine;20 repititions  -WM       Row Name             Wound 11/07/23 1240 Right anterior foot    Wound - Properties Group Placement Date: 11/07/23  - Placement Time: 1240  -FH Side: Right  -FH Orientation: anterior  -FH Location: foot  -FH    Retired Wound - Properties Group Placement Date: 11/07/23  - Placement Time: 1240  -FH Side: Right  -FH Orientation: anterior  -FH Location: foot  -FH    Retired Wound - Properties Group Date first assessed: 11/07/23  - Time first assessed: 1240  -FH Side: Right  -FH Location: foot  -FH      Row Name             Wound 11/07/23 1238 Left lower arm    Wound - Properties Group Placement Date: 11/07/23  - Placement Time: 1238  -FH Side: Left  -FH Orientation: lower  -FH Location: arm  -FH    Retired Wound - Properties Group Placement Date: 11/07/23  - Placement Time: 1238  -FH Side: Left  -FH Orientation: lower  -FH Location: arm  -FH    Retired Wound - Properties Group Date first assessed: 11/07/23  - Time first assessed: 1238  -FH Side: Left  -FH Location: arm  -FH      Row Name 12/07/23 1117          Positioning and Restraints    Pre-Treatment Position in bed  -WM     Post Treatment Position bed  -     In Bed supine;call light within reach;encouraged to call for assist  -       Row Name 12/07/23 0900          Therapy Assessment/Plan (PT)    Rehab Potential (PT) fair, will monitor progress closely   -MOE     Criteria for Skilled Interventions Met (PT) skilled treatment is necessary  -MOE     Therapy Frequency (PT) 6 times/wk  -MOE     Predicted Duration of Therapy Intervention (PT) 10 days  -MOE     Problem List (PT) problems related to;balance;mobility;strength;pain  -MOE     Activity Limitations Related to Problem List (PT) unable to ambulate safely;unable to transfer safely  -MOE       Row Name 12/07/23 1117 12/07/23 0900       Progress Summary (PT)    Progress Toward Functional Goals (PT) progress toward functional goals is fair  -WM progress toward functional goals is gradual  -MOE    Daily Progress Summary (PT) Pt stood  3 times with longest for 15 seconds. He is not able to advance RLE to ambulate. Pts left hip and knee have occasional popping sound with ROM.  -WM Pt has made little to no progress toward his goals.  He is still very weak and will require skilled PT services to address his mobility issues but he will need to show more effort and progress druing this reevaluation period to continue to be appropriate.  Will continue current plan another 30 day period.  Pt was instructed that if no progress is made that we will have no choice but to d/c services due to lack of progress.  -MOE      Row Name 12/07/23 0900          Physical Therapy Goals    Bed Mobility Goal Selection (PT) bed mobility, PT goal 1  -MOE     Transfer Goal Selection (PT) transfer, PT goal 1  -MOE     Gait Training Goal Selection (PT) gait training, PT goal 1  -MOE       Row Name 12/07/23 0900          Bed Mobility Goal 1 (PT)    Activity/Assistive Device (Bed Mobility Goal 1, PT) bed mobility activities, all  -MOE     [place] Level/Cues Needed (Bed Mobility Goal 1, PT) independent  -MOE     Time Frame (Bed Mobility Goal 1, PT) 30 days;long term goal (LTG)  -MOE     Progress/Outcomes (Bed Mobility Goal 1, PT) progress slower than expected;unable to make needed progress  -MOE       Row Name 12/07/23 0900          Transfer Goal 1 (PT)     Activity/Assistive Device (Transfer Goal 1, PT) transfers, all  -MOE     [place] Level/Cues Needed (Transfer Goal 1, PT) independent  -MOE     Time Frame (Transfer Goal 1, PT) long term goal (LTG);30 days  -MOE     Progress/Outcome (Transfer Goal 1, PT) progress slower than expected;unable to make needed progress  -MOE       Row Name 12/07/23 0900          Gait Training Goal 1 (PT)    Activity/Assistive Device (Gait Training Goal 1, PT) gait (walking locomotion);assistive device use;walker, rolling  -MOE     [place] Level (Gait Training Goal 1, PT) independent  -MOE     Distance (Gait Training Goal 1, PT) 10  -MOE     Time Frame (Gait Training Goal 1, PT) long term goal (LTG);30 days  -MOE     Progress/Outcome (Gait Training Goal 1, PT) progress slower than expected;unable to make needed progress  -MOE       Row Name 12/07/23 0900          Strength Goal 1 (PT)    Strength Goal 1 (PT) Patient will demonstrate improvement in gross bilateral lower extremity strength to 3+/5  -MOE     Time Frame (Strength Goal 1, PT) long term goal (LTG);30 days  -MOE     Progress/Outcome (Strength Goal 1, PT) progress slower than expected;unable to make needed progress  -MOE               User Key  (r) = Recorded By, (t) = Taken By, (c) = Cosigned By      Initials Name Provider Type     Carlos Cagle, RN Registered Nurse    Carson Johnson PTA Physical Therapist Assistant    Merlin De La O PT Physical Therapist                  Section G              Section GG                       Physical Therapy Education       Title: PT OT SLP Therapies (Done)       Topic: Physical Therapy (Done)       Point: Mobility training (Done)       Learning Progress Summary             Patient Acceptance, E,TB, VU by RM at 11/30/2023 0420    Acceptance, E,TB, VU by MOE at 11/8/2023 1341                         Point: Precautions (Done)       Learning Progress Summary             Patient Acceptance, E,TB, VU by RM at 11/30/2023 0420     Acceptance, E,TB, VU by MOE at 11/8/2023 1341                                         User Key       Initials Effective Dates Name Provider Type Discipline     06/08/21 -  Bharath Berg, RN Registered Nurse Nurse    MOE 06/03/21 -  Merlin Rao, PT Physical Therapist PT                  PT Recommendation and Plan     Progress Summary (PT)  Progress Toward Functional Goals (PT): progress toward functional goals is fair  Daily Progress Summary (PT): Pt stood  3 times with longest for 15 seconds. He is not able to advance RLE to ambulate. Pts left hip and knee have occasional popping sound with ROM.   Outcome Measures       Row Name 12/07/23 1128 12/07/23 0900 12/06/23 1019       How much help from another person do you currently need...    Turning from your back to your side while in flat bed without using bedrails? 3  -WM 3  -MOE 3  -WM    Moving from lying on back to sitting on the side of a flat bed without bedrails? 3  -WM 3  -MOE 3  -WM    Moving to and from a bed to a chair (including a wheelchair)? 1  -WM 1  -MOE 1  -WM    Standing up from a chair using your arms (e.g., wheelchair, bedside chair)? 2  -WM 2  -MOE 2  -WM    Climbing 3-5 steps with a railing? 1  -WM 1  -MOE 1  -WM    To walk in hospital room? 1  -WM 1  -MOE 1  -WM    AM-PAC 6 Clicks Score (PT) 11  -WM 11  -MOE 11  -WM    Highest Level of Mobility Goal 4 --> Transfer to chair/commode  -WM 4 --> Transfer to chair/commode  -MOE 4 --> Transfer to chair/commode  -WM       Functional Assessment    Outcome Measure Options -- AM-PAC 6 Clicks Basic Mobility (PT)  -MOE --      Row Name 12/05/23 1443 12/04/23 1300          How much help from another person do you currently need...    Turning from your back to your side while in flat bed without using bedrails? 3  -WM 3  -CS     Moving from lying on back to sitting on the side of a flat bed without bedrails? 3  -WM 3  -CS     Moving to and from a bed to a chair (including a wheelchair)? 1  -WM 1  -CS     Standing  up from a chair using your arms (e.g., wheelchair, bedside chair)? 2  -WM 2  -CS     Climbing 3-5 steps with a railing? 1  -WM 1  -CS     To walk in hospital room? 1  -WM 1  -CS     AM-PAC 6 Clicks Score (PT) 11  -WM 11  -CS     Highest Level of Mobility Goal 4 --> Transfer to chair/commode  -WM 4 --> Transfer to chair/commode  -CS        Functional Assessment    Outcome Measure Options -- AM-PAC 6 Clicks Basic Mobility (PT)  -CS               User Key  (r) = Recorded By, (t) = Taken By, (c) = Cosigned By      Initials Name Provider Type     Carson Inman PTA Physical Therapist Assistant    Merlin De La O, PT Physical Therapist    Ronald Huerta PTA Physical Therapist Assistant                     Time Calculation:    PT Charges       Row Name 12/07/23 1115 12/07/23 0909          Time Calculation    PT Received On 12/07/23  -WM --        Timed Charges    10092 - PT Therapeutic Exercise Minutes 15  -WM --     90450 - PT Therapeutic Activity Minutes 24  -WM --        Untimed Charges    PT Eval/Re-eval Minutes -- 15  -MOE        SNF Physical Therapy Minutes    Skilled Minutes- PT 39 min  -WM --        Total Minutes    Timed Charges Total Minutes 39  -WM --     Untimed Charges Total Minutes -- 15  -MOE      Total Minutes 39  -WM 15  -MOE               User Key  (r) = Recorded By, (t) = Taken By, (c) = Cosigned By      Initials Name Provider Type     Carson Inman PTA Physical Therapist Assistant    Merlin De La O, PT Physical Therapist                  Therapy Charges for Today       Code Description Service Date Service Provider Modifiers Qty    [id number]  PT THER PROC EA 15 MIN 12/6/2023 Carson Inman PTA GP 1            PT G-Codes  Outcome Measure Options: AM-PAC 6 Clicks Daily Activity (OT), Optimal Instrument  AM-PAC 6 Clicks Score (PT): 11  AM-PAC 6 Clicks Score (OT): 14    Carson Inman PTA  12/7/2023

## 2023-12-07 NOTE — PLAN OF CARE
Goal Outcome Evaluation:  Plan of Care Reviewed With: patient           Outcome Evaluation: Pt is AO x 4 and VSS. Suggested for him to turn on side when he c/o coccyx pain and he refused to turn. He uses the trapeze bar to move up in bed and the side rails to turn in bed. Pain medication x 2 and Melatonin  given. Pt has slept very little during the night. He requested 2 ice creams and peanut butter crackers. He had tacos brought in earlier in the night. Will continue with his plan of care. Call light and personal items within reach.

## 2023-12-07 NOTE — THERAPY RE-EVALUATION
Patient Name: Jose Shaikh  : 1960    MRN: [id number]                              Today's Date: 2023       Admit Date: 2023    Visit Dx:     ICD-10-CM ICD-9-CM   1. Difficulty in walking  R26.2 719.7     Patient Active Problem List   Diagnosis    Diabetic ulcer of left heel associated with type 2 DM    Acute osteomyelitis of left calcaneus     Diabetic ulcer of left heel associated with type 2 DM    Diabetic ulcer of right midfoot associated with type 2 DM    Paroxysmal atrial fibrillation    Essential hypertension    Hyperlipidemia LDL goal <100    Cellulitis and abscess of foot    High alkaline phosphatase level    Osteomyelitis    Onychomycosis    Onychocryptosis    Foot pain, bilateral    Osteomyelitis of foot, right, acute    Cellulitis of right foot    Type 2 diabetes mellitus, with long-term current use of insulin    Class 3 severe obesity due to excess calories with serious comorbidity and body mass index (BMI) of 45.0 to 49.9 in adult    Anxiety disorder, unspecified    Claustrophobia    Dependence on wheelchair    Depression, unspecified    Long term (current) use of anticoagulants    Long term (current) use of oral hypoglycemic drugs    Wound of foot    Non-prs chronic ulcer oth prt r foot limited to brkdwn skin    Orthostatic hypotension    Other chronic osteomyelitis, right ankle and foot    Personal history of nicotine dependence    Thrombocytopenia, unspecified    Unspecified open wound, right foot, initial encounter    Diabetic foot infection    Subacute osteomyelitis of right foot    Right foot pain    Sepsis    Onychomycosis    Foot pain, left    Impaired mobility and ADLs    Absence of toe of right foot    Corns and callosity    Disability of walking    Fracture    Limb swelling    Polyneuropathy    Pressure ulcer, stage 1    Shortness of breath    Generalized weakness    Debility     Past Medical History:   Diagnosis Date    Absence of toe of right foot     Acute  osteomyelitis of left calcaneus  08/18/2021    Anxiety and depression     Arthritis     Cancer     Claustrophobia     Corns and callus     Diabetic ulcer of left heel associated with type 2 DM 08/18/2021    Diabetic ulcer of left heel associated with type 2 DM 07/06/2021    Diabetic ulcer of right midfoot associated with type 2 DM 08/18/2021    Difficulty walking     Essential hypertension 08/31/2021    Hammertoe     Hyperlipidemia LDL goal <100 08/31/2021    Ingrown toenail     Obesity     Paroxysmal atrial fibrillation 08/31/2021    Polyneuropathy     Pressure ulcer, stage 1     Tinea unguium     Type 2 diabetes mellitus with polyneuropathy      Past Surgical History:   Procedure Laterality Date    CYST REMOVAL      center of back; benign    EYE SURGERY      INCISION AND DRAINAGE ABSCESS      back    INCISION AND DRAINAGE LEG Right 12/10/2021    Procedure: INCISION AND DRAINAGE LOWER EXTREMITY;  Surgeon: Ash Leyva DPM;  Location: [facility];  Service: Podiatry;  Laterality: Right;    OTHER SURGICAL HISTORY      Surgical clips left foot    TOE SURGERY Right     Removal of 5th toe    TRANS METATARSAL AMPUTATION Right 12/02/2021    Procedure: AMPUTATION TRANS METATARSAL;  Surgeon: Ash Leyva DPM;  Location: [facility] OR;  Service: Podiatry;  Laterality: Right;    VASCULAR SURGERY      WRIST SURGERY Left     repair of injury      General Information       Row Name 12/07/23 1027          OT Time and Intention    Document Type therapy note (daily note)  -PG     Mode of Treatment individual therapy;occupational therapy  -PG               User Key  (r) = Recorded By, (t) = Taken By, (c) = Cosigned By      Initials Name Provider Type    PG Kodak Matos OT Occupational Therapist                     Mobility/ADL's       Row Name 12/07/23 1027          Transfers    Transfers sit-stand transfer;stand-sit transfer  -PG       Row Name 12/07/23 1027          Sit-Stand Transfer    Sit-Stand  [place] (Transfers) moderate assist (50% patient effort);2 person assist;verbal cues  -PG     Assistive Device (Sit-Stand Transfers) bariatric;walker, front-wheeled  -PG       Row Name 12/07/23 1027          Stand-Sit Transfer    Stand-Sit [place] (Transfers) moderate assist (50% patient effort);2 person assist;verbal cues  -PG     Assistive Device (Stand-Sit Transfers) bariatric;walker, front-wheeled  -PG               User Key  (r) = Recorded By, (t) = Taken By, (c) = Cosigned By      Initials Name Provider Type    PG Kodak Matos, BEKAH Occupational Therapist                   Obj/Interventions    No documentation.                  Goals/Plan    No documentation.                  Clinical Impression       Row Name 12/07/23 1027          Plan of Care Review    Progress no change  -PG               User Key  (r) = Recorded By, (t) = Taken By, (c) = Cosigned By      Initials Name Provider Type    PG Kodak Matos, OT Occupational Therapist                   Outcome Measures       Row Name 12/07/23 1027          How much help from another is currently needed...    Putting on and taking off regular lower body clothing? 1  -PG     Bathing (including washing, rinsing, and drying) 2  -PG     Toileting (which includes using toilet bed pan or urinal) 1  -PG     Putting on and taking off regular upper body clothing 3  -PG     Taking care of personal grooming (such as brushing teeth) 3  -PG     Eating meals 4  -PG     AM-PAC 6 Clicks Score (OT) 14  -PG       Row Name 12/07/23 0900          How much help from another person do you currently need...    Turning from your back to your side while in flat bed without using bedrails? 3  -MOE     Moving from lying on back to sitting on the side of a flat bed without bedrails? 3  -MOE     Moving to and from a bed to a chair (including a wheelchair)? 1  -MOE     Standing up from a chair using your arms (e.g., wheelchair, bedside chair)? 2  -MOE     Climbing 3-5 steps with a  railing? 1  -MOE     To walk in hospital room? 1  -MOE     AM-PAC 6 Clicks Score (PT) 11  -MOE     Highest Level of Mobility Goal 4 --> Transfer to chair/commode  -MOE       Row Name 12/07/23 1027 12/07/23 0900       Functional Assessment    Outcome Measure Options AM-PAC 6 Clicks Daily Activity (OT);Optimal Instrument  -PG AM-PAC 6 Clicks Basic Mobility (PT)  -MOE      Row Name 12/07/23 1027          Optimal Instrument    Optimal Instrument Optimal - 3  -PG     Bending/Stooping 5  -PG     Standing 4  -PG     Reaching 1  -PG               User Key  (r) = Recorded By, (t) = Taken By, (c) = Cosigned By      Initials Name Provider Type    PG Kodak Matos, OT Occupational Therapist    Merlin De La O, PT Physical Therapist                    Occupational Therapy Education       Title: PT OT SLP Therapies (Done)       Topic: Occupational Therapy (Done)       Point: ADL training (Done)       Description:   Instruct learner(s) on proper safety adaptation and remediation techniques during self care or transfers.   Instruct in proper use of assistive devices.                  Learning Progress Summary             Patient Acceptance, E,TB, VU by RM at 11/30/2023 0420    Acceptance, E,D, DU by PG at 11/7/2023 0932                         Point: Home exercise program (Done)       Description:   Instruct learner(s) on appropriate technique for monitoring, assisting and/or progressing therapeutic exercises/activities.                  Learning Progress Summary             Patient Acceptance, E,TB, VU by RM at 11/30/2023 0420    Acceptance, E,D, DU by PG at 11/7/2023 0932                         Point: Precautions (Done)       Description:   Instruct learner(s) on prescribed precautions during self-care and functional transfers.                  Learning Progress Summary             Patient Acceptance, E,TB, VU by RM at 11/30/2023 0420    Acceptance, E,D, DU by PG at 11/7/2023 0932                         Point: Body mechanics  (Done)       Description:   Instruct learner(s) on proper positioning and spine alignment during self-care, functional mobility activities and/or exercises.                  Learning Progress Summary             Patient Acceptance, E,TB, VU by  at 11/30/2023 0420    Acceptance, E,D, DU by PG at 11/7/2023 0932                                         User Key       Initials Effective Dates Name Provider Type Discipline     06/08/21 -  Bharath Berg, RN Registered Nurse Nurse    PG 06/16/21 -  Kodak Matos OT Occupational Therapist OT                  OT Recommendation and Plan  Planned Therapy Interventions (OT): activity tolerance training, BADL retraining, strengthening exercise, transfer/mobility retraining, patient/caregiver education/training, occupation/activity based interventions  Therapy Frequency (OT): 5 times/wk  Plan of Care Review  Plan of Care Reviewed With: patient  Progress: no change  Outcome Evaluation: Patient participated in upper body strengthening exercises utilizing 6 pound weights, 2 sets x 15 repetitions.  Patient stood mod assist x 2 and able to advance feet 3 inches with assist to advance right foot forward and 2 others to push bed.  Continue OT services per plan of care     Time Calculation:   Evaluation Complexity (OT)  Review Occupational Profile/Medical/Therapy History Complexity: brief/low complexity  Assessment, Occupational Performance/Identification of Deficit Complexity: 3-5 performance deficits  Clinical Decision Making Complexity (OT): problem focused assessment/low complexity  Overall Complexity of Evaluation (OT): low complexity     Time Calculation- OT       Row Name 12/07/23 1029 12/07/23 1028          Time Calculation- OT    OT Received On -- 12/07/23  -PG     OT Goal Re-Cert Due Date -- 12/27/23  -PG        Timed Charges    28464 - OT Therapeutic Activity Minutes -- 20  -PG        SNF Occupational Therapy Minutes    Skilled Minutes- OT 20 min  -PG --        Total  Minutes    Timed Charges Total Minutes -- 20  -PG      Total Minutes -- 20  -PG               User Key  (r) = Recorded By, (t) = Taken By, (c) = Cosigned By      Initials Name Provider Type    PG Kodak Matos OT Occupational Therapist                  Therapy Charges for Today       Code Description Service Date Service Provider Modifiers Qty    [id number]  OT THERAPEUTIC ACT EA 15 MIN 12/7/2023 Kodak Matos OT GO 1                 Kodak Matos OT  12/7/2023

## 2023-12-07 NOTE — THERAPY EVALUATION
Acute Care - Physical Therapy Re-Evaluation  [facility]     Patient Name: Jose Shaikh  : 1960  MRN: [id number]  Today's Date: 2023      Visit Dx:     ICD-10-CM ICD-9-CM   1. Difficulty in walking  R26.2 719.7     Patient Active Problem List   Diagnosis    Diabetic ulcer of left heel associated with type 2 DM    Acute osteomyelitis of left calcaneus     Diabetic ulcer of left heel associated with type 2 DM    Diabetic ulcer of right midfoot associated with type 2 DM    Paroxysmal atrial fibrillation    Essential hypertension    Hyperlipidemia LDL goal <100    Cellulitis and abscess of foot    High alkaline phosphatase level    Osteomyelitis    Onychomycosis    Onychocryptosis    Foot pain, bilateral    Osteomyelitis of foot, right, acute    Cellulitis of right foot    Type 2 diabetes mellitus, with long-term current use of insulin    Class 3 severe obesity due to excess calories with serious comorbidity and body mass index (BMI) of 45.0 to 49.9 in adult    Anxiety disorder, unspecified    Claustrophobia    Dependence on wheelchair    Depression, unspecified    Long term (current) use of anticoagulants    Long term (current) use of oral hypoglycemic drugs    Wound of foot    Non-prs chronic ulcer oth prt r foot limited to brkdwn skin    Orthostatic hypotension    Other chronic osteomyelitis, right ankle and foot    Personal history of nicotine dependence    Thrombocytopenia, unspecified    Unspecified open wound, right foot, initial encounter    Diabetic foot infection    Subacute osteomyelitis of right foot    Right foot pain    Sepsis    Onychomycosis    Foot pain, left    Impaired mobility and ADLs    Absence of toe of right foot    Corns and callosity    Disability of walking    Fracture    Limb swelling    Polyneuropathy    Pressure ulcer, stage 1    Shortness of breath    Generalized weakness    Debility     Past Medical History:   Diagnosis Date    Absence of toe of right foot     Acute  osteomyelitis of left calcaneus  08/18/2021    Anxiety and depression     Arthritis     Cancer     Claustrophobia     Corns and callus     Diabetic ulcer of left heel associated with type 2 DM 08/18/2021    Diabetic ulcer of left heel associated with type 2 DM 07/06/2021    Diabetic ulcer of right midfoot associated with type 2 DM 08/18/2021    Difficulty walking     Essential hypertension 08/31/2021    Hammertoe     Hyperlipidemia LDL goal <100 08/31/2021    Ingrown toenail     Obesity     Paroxysmal atrial fibrillation 08/31/2021    Polyneuropathy     Pressure ulcer, stage 1     Tinea unguium     Type 2 diabetes mellitus with polyneuropathy      Past Surgical History:   Procedure Laterality Date    CYST REMOVAL      center of back; benign    EYE SURGERY      INCISION AND DRAINAGE ABSCESS      back    INCISION AND DRAINAGE LEG Right 12/10/2021    Procedure: INCISION AND DRAINAGE LOWER EXTREMITY;  Surgeon: Ash Leyva DPM;  Location: [facility];  Service: Podiatry;  Laterality: Right;    OTHER SURGICAL HISTORY      Surgical clips left foot    TOE SURGERY Right     Removal of 5th toe    TRANS METATARSAL AMPUTATION Right 12/02/2021    Procedure: AMPUTATION TRANS METATARSAL;  Surgeon: Ash Leyva DPM;  Location: [facility] OR;  Service: Podiatry;  Laterality: Right;    VASCULAR SURGERY      WRIST SURGERY Left     repair of injury     PT Assessment (last 12 hours)       PT Evaluation and Treatment       Row Name 12/07/23 0900          Physical Therapy Time and Intention    Subjective Information no complaints  -MOE     Document Type re-evaluation  -MOE     Mode of Treatment individual therapy;physical therapy  -MOE     Patient Effort adequate  -MOE       Row Name 12/07/23 0900          Range of Motion (ROM)    Range of Motion ROM is WFL  -MOE       Row Name 12/07/23 0900          Strength (Manual Muscle Testing)    Strength (Manual Muscle Testing) bilateral lower extremities  RLE 3+/5; LLE 2/5   -MOE       Row Name 12/07/23 0900          Bed Mobility    All Activities, Jack (Bed Mobility) standby assist  -MOE     Supine-Sit [place] (Bed Mobility) moderate assist (50% patient effort);verbal cues  -MOE       Row Name 12/07/23 0900          Sit-Stand Transfer    Sit-Stand [place] (Transfers) moderate assist (50% patient effort);2 person assist;verbal cues  -MOE       Row Name 12/07/23 0900          Safety Issues, Functional Mobility    Impairments Affecting Function (Mobility) balance;endurance/activity tolerance;pain;range of motion (ROM);strength  -MOE       Row Name             Wound 11/07/23 1240 Right anterior foot    Wound - Properties Group Placement Date: 11/07/23  - Placement Time: 1240  -FH Side: Right  -FH Orientation: anterior  -FH Location: foot  -FH    Retired Wound - Properties Group Placement Date: 11/07/23  - Placement Time: 1240  -FH Side: Right  -FH Orientation: anterior  -FH Location: foot  -FH    Retired Wound - Properties Group Date first assessed: 11/07/23  - Time first assessed: 1240  -FH Side: Right  -FH Location: foot  -FH      Row Name             Wound 11/07/23 1238 Left lower arm    Wound - Properties Group Placement Date: 11/07/23  - Placement Time: 1238  -FH Side: Left  -FH Orientation: lower  -FH Location: arm  -FH    Retired Wound - Properties Group Placement Date: 11/07/23  - Placement Time: 1238  -FH Side: Left  -FH Orientation: lower  -FH Location: arm  -FH    Retired Wound - Properties Group Date first assessed: 11/07/23  - Time first assessed: 1238  -FH Side: Left  -FH Location: arm  -FH      Row Name 12/07/23 0900          Therapy Assessment/Plan (PT)    Rehab Potential (PT) fair, will monitor progress closely  -MOE     Criteria for Skilled Interventions Met (PT) skilled treatment is necessary  -MOE     Therapy Frequency (PT) 6 times/wk  -MEO     Predicted Duration of Therapy Intervention (PT) 10 days  -MOE     Problem List (PT) problems related  to;balance;mobility;strength;pain  -MOE     Activity Limitations Related to Problem List (PT) unable to ambulate safely;unable to transfer safely  -MOE       Row Name 12/07/23 0900          Progress Summary (PT)    Progress Toward Functional Goals (PT) progress toward functional goals is gradual  -MOE     Daily Progress Summary (PT) Pt has made little to no progress toward his goals.  He is still very weak and will require skilled PT services to address his mobility issues but he will need to show more effort and progress druing this reevaluation period to continue to be appropriate.  Will continue current plan another 30 day period.  Pt was instructed that if no progress is made that we will have no choice but to d/c services due to lack of progress.  -MOE       Row Name 12/07/23 0900          Physical Therapy Goals    Bed Mobility Goal Selection (PT) bed mobility, PT goal 1  -MOE     Transfer Goal Selection (PT) transfer, PT goal 1  -MOE     Gait Training Goal Selection (PT) gait training, PT goal 1  -MOE       Row Name 12/07/23 0900          Bed Mobility Goal 1 (PT)    Activity/Assistive Device (Bed Mobility Goal 1, PT) bed mobility activities, all  -MOE     [place] Level/Cues Needed (Bed Mobility Goal 1, PT) independent  -MOE     Time Frame (Bed Mobility Goal 1, PT) 30 days;long term goal (LTG)  -MOE     Progress/Outcomes (Bed Mobility Goal 1, PT) progress slower than expected;unable to make needed progress  -MOE       Row Name 12/07/23 0900          Transfer Goal 1 (PT)    Activity/Assistive Device (Transfer Goal 1, PT) transfers, all  -MOE     [place] Level/Cues Needed (Transfer Goal 1, PT) independent  -MOE     Time Frame (Transfer Goal 1, PT) long term goal (LTG);30 days  -MOE     Progress/Outcome (Transfer Goal 1, PT) progress slower than expected;unable to make needed progress  -MOE       Row Name 12/07/23 0900          Gait Training Goal 1 (PT)    Activity/Assistive Device (Gait Training Goal 1, PT) gait  (walking locomotion);assistive device use;walker, rolling  -MOE     [place] Level (Gait Training Goal 1, PT) independent  -MOE     Distance (Gait Training Goal 1, PT) 10  -MOE     Time Frame (Gait Training Goal 1, PT) long term goal (LTG);30 days  -MOE     Progress/Outcome (Gait Training Goal 1, PT) progress slower than expected;unable to make needed progress  -MOE       Row Name 12/07/23 0900          Strength Goal 1 (PT)    Strength Goal 1 (PT) Patient will demonstrate improvement in gross bilateral lower extremity strength to 3+/5  -MOE     Time Frame (Strength Goal 1, PT) long term goal (LTG);30 days  -MOE     Progress/Outcome (Strength Goal 1, PT) progress slower than expected;unable to make needed progress  -MOE               User Key  (r) = Recorded By, (t) = Taken By, (c) = Cosigned By      Initials Name Provider Type     Carlos Cagle RN Registered Nurse    Merlin De La O PT Physical Therapist                    Physical Therapy Education       Title: PT OT SLP Therapies (Done)       Topic: Physical Therapy (Done)       Point: Mobility training (Done)       Learning Progress Summary             Patient Acceptance, E,TB, VU by  at 11/30/2023 0420    Acceptance, E,TB, VU by MOE at 11/8/2023 1341                         Point: Precautions (Done)       Learning Progress Summary             Patient Acceptance, E,TB, VU by  at 11/30/2023 0420    Acceptance, E,TB, VU by MOE at 11/8/2023 1341                                         User Key       Initials Effective Dates Name Provider Type [facility] 06/08/21 -  Bharath Berg RN Registered Nurse Nurse    MOE 06/03/21 -  Merlin Rao PT Physical Therapist PT                  PT Recommendation and Plan  Anticipated Discharge Disposition (PT): home with home health  Planned Therapy Interventions (PT): bed mobility training, balance training, gait training, joint mobilization, home exercise program, stair training, strengthening, transfer  training  Therapy Frequency (PT): 6 times/wk  Progress Summary (PT)  Progress Toward Functional Goals (PT): progress toward functional goals is gradual  Daily Progress Summary (PT): Pt has made little to no progress toward his goals.  He is still very weak and will require skilled PT services to address his mobility issues but he will need to show more effort and progress druing this reevaluation period to continue to be appropriate.  Will continue current plan another 30 day period.  Pt was instructed that if no progress is made that we will have no choice but to d/c services due to lack of progress.  Plan of Care Reviewed With: patient  Outcome Evaluation: Patient presents with decreased strength, transfers and ambulation.  Skilled physical therapy services will be required to address these mobility deficits.   Outcome Measures       Row Name 12/07/23 0900 12/06/23 1019 12/05/23 1443       How much help from another person do you currently need...    Turning from your back to your side while in flat bed without using bedrails? 3  -MOE 3  -WM 3  -WM    Moving from lying on back to sitting on the side of a flat bed without bedrails? 3  -MOE 3  -WM 3  -WM    Moving to and from a bed to a chair (including a wheelchair)? 1  -MOE 1  -WM 1  -WM    Standing up from a chair using your arms (e.g., wheelchair, bedside chair)? 2  -MOE 2  -WM 2  -WM    Climbing 3-5 steps with a railing? 1  -MOE 1  -WM 1  -WM    To walk in hospital room? 1  -MOE 1  -WM 1  -WM    AM-PAC 6 Clicks Score (PT) 11  -MOE 11  -WM 11  -WM    Highest Level of Mobility Goal 4 --> Transfer to chair/commode  -MOE 4 --> Transfer to chair/commode  -WM 4 --> Transfer to chair/commode  -WM       Functional Assessment    Outcome Measure Options AM-PAC 6 Clicks Basic Mobility (PT)  -MOE -- --      Row Name 12/04/23 1300             How much help from another person do you currently need...    Turning from your back to your side while in flat bed without using bedrails? 3   -CS      Moving from lying on back to sitting on the side of a flat bed without bedrails? 3  -CS      Moving to and from a bed to a chair (including a wheelchair)? 1  -CS      Standing up from a chair using your arms (e.g., wheelchair, bedside chair)? 2  -CS      Climbing 3-5 steps with a railing? 1  -CS      To walk in hospital room? 1  -CS      AM-PAC 6 Clicks Score (PT) 11  -CS      Highest Level of Mobility Goal 4 --> Transfer to chair/commode  -CS         Functional Assessment    Outcome Measure Options AM-PAC 6 Clicks Basic Mobility (PT)  -CS                User Key  (r) = Recorded By, (t) = Taken By, (c) = Cosigned By      Initials Name Provider Type    Carson Johnson, PTA Physical Therapist Assistant    Merlin De La O, PT Physical Therapist    Ronald Huerta PTA Physical Therapist Assistant                     Time Calculation:    PT Charges       Row Name 12/07/23 0909             Untimed Charges    PT Eval/Re-eval Minutes 15  -MOE         Total Minutes    Untimed Charges Total Minutes 15  -MOE       Total Minutes 15  -MOE                User Key  (r) = Recorded By, (t) = Taken By, (c) = Cosigned By      Initials Name Provider Type    Merlin De La O, PT Physical Therapist                      PT G-Codes  Outcome Measure Options: AM-PAC 6 Clicks Basic Mobility (PT)  AM-PAC 6 Clicks Score (PT): 11  AM-PAC 6 Clicks Score (OT): 15    Merlin Rao, PT  12/7/2023

## 2023-12-08 LAB
GLUCOSE BLDC GLUCOMTR-MCNC: 125 MG/DL (ref 70–99)
GLUCOSE BLDC GLUCOMTR-MCNC: 126 MG/DL (ref 70–99)
GLUCOSE BLDC GLUCOMTR-MCNC: 155 MG/DL (ref 70–99)
GLUCOSE BLDC GLUCOMTR-MCNC: 200 MG/DL (ref 70–99)

## 2023-12-08 PROCEDURE — 63710000001 INSULIN LISPRO (HUMAN) PER 5 UNITS: Performed by: INTERNAL MEDICINE

## 2023-12-08 PROCEDURE — 82948 REAGENT STRIP/BLOOD GLUCOSE: CPT

## 2023-12-08 PROCEDURE — 63710000001 INSULIN DETEMIR PER 5 UNITS: Performed by: PHYSICIAN ASSISTANT

## 2023-12-08 PROCEDURE — 97110 THERAPEUTIC EXERCISES: CPT

## 2023-12-08 RX ORDER — CHOLECALCIFEROL (VITAMIN D3) 125 MCG
10 CAPSULE ORAL NIGHTLY
Status: DISPENSED | OUTPATIENT
Start: 2023-12-08

## 2023-12-08 RX ORDER — SERTRALINE HYDROCHLORIDE 25 MG/1
25 TABLET, FILM COATED ORAL NIGHTLY
Status: DISCONTINUED | OUTPATIENT
Start: 2023-12-08 | End: 2024-02-18

## 2023-12-08 RX ADMIN — HYDROCORTISONE 1 APPLICATION: 1 OINTMENT TOPICAL at 20:02

## 2023-12-08 RX ADMIN — INSULIN DETEMIR 70 UNITS: 100 INJECTION, SOLUTION SUBCUTANEOUS at 08:02

## 2023-12-08 RX ADMIN — OXYCODONE AND ACETAMINOPHEN 1 TABLET: 7.5; 325 TABLET ORAL at 04:53

## 2023-12-08 RX ADMIN — Medication 10 MG: at 20:10

## 2023-12-08 RX ADMIN — EMPAGLIFLOZIN 10 MG: 10 TABLET, FILM COATED ORAL at 08:02

## 2023-12-08 RX ADMIN — INSULIN LISPRO 8 UNITS: 100 INJECTION, SOLUTION INTRAVENOUS; SUBCUTANEOUS at 17:48

## 2023-12-08 RX ADMIN — INSULIN LISPRO 4 UNITS: 100 INJECTION, SOLUTION INTRAVENOUS; SUBCUTANEOUS at 20:03

## 2023-12-08 RX ADMIN — ATORVASTATIN CALCIUM 10 MG: 10 TABLET, FILM COATED ORAL at 20:02

## 2023-12-08 RX ADMIN — Medication 250 MG: at 20:03

## 2023-12-08 RX ADMIN — LISINOPRIL 2.5 MG: 2.5 TABLET ORAL at 08:02

## 2023-12-08 RX ADMIN — FERROUS SULFATE TAB 325 MG (65 MG ELEMENTAL FE) 325 MG: 325 (65 FE) TAB at 08:02

## 2023-12-08 RX ADMIN — APIXABAN 5 MG: 5 TABLET, FILM COATED ORAL at 08:02

## 2023-12-08 RX ADMIN — INSULIN DETEMIR 60 UNITS: 100 INJECTION, SOLUTION SUBCUTANEOUS at 20:04

## 2023-12-08 RX ADMIN — HYDROCORTISONE 1 APPLICATION: 1 OINTMENT TOPICAL at 08:19

## 2023-12-08 RX ADMIN — CYANOCOBALAMIN TAB 500 MCG 1000 MCG: 500 TAB at 08:02

## 2023-12-08 RX ADMIN — HYDROCORTISONE 1 APPLICATION: 1 OINTMENT TOPICAL at 16:11

## 2023-12-08 RX ADMIN — APIXABAN 5 MG: 5 TABLET, FILM COATED ORAL at 20:02

## 2023-12-08 RX ADMIN — PREGABALIN 100 MG: 100 CAPSULE ORAL at 16:11

## 2023-12-08 RX ADMIN — OXYCODONE AND ACETAMINOPHEN 1 TABLET: 7.5; 325 TABLET ORAL at 10:26

## 2023-12-08 RX ADMIN — PREGABALIN 100 MG: 100 CAPSULE ORAL at 20:02

## 2023-12-08 RX ADMIN — SERTRALINE HYDROCHLORIDE 25 MG: 25 TABLET ORAL at 20:10

## 2023-12-08 RX ADMIN — Medication 250 MG: at 08:02

## 2023-12-08 RX ADMIN — PREGABALIN 100 MG: 100 CAPSULE ORAL at 08:02

## 2023-12-08 RX ADMIN — OXYCODONE AND ACETAMINOPHEN 1 TABLET: 7.5; 325 TABLET ORAL at 19:25

## 2023-12-08 NOTE — PLAN OF CARE
Goal Outcome Evaluation:     Problem: Adult Inpatient Plan of Care  Goal: Absence of Hospital-Acquired Illness or Injury  Intervention: Identify and Manage Fall Risk  Recent Flowsheet Documentation  Taken 12/8/2023 0300 by Roseline Garcia LPN  Safety Promotion/Fall Prevention: safety round/check completed  Taken 12/8/2023 0058 by Roseline Garcia LPN  Safety Promotion/Fall Prevention: safety round/check completed  Taken 12/7/2023 2300 by Roseline Garcia LPN  Safety Promotion/Fall Prevention: safety round/check completed  Taken 12/7/2023 2105 by Roseline Garcia LPN  Safety Promotion/Fall Prevention: safety round/check completed  Taken 12/7/2023 1920 by Roseline Garcia LPN  Safety Promotion/Fall Prevention: safety round/check completed  Intervention: Prevent Skin Injury  Recent Flowsheet Documentation  Taken 12/8/2023 0300 by Roseline Garcia LPN  Body Position: patient/family refused  Intervention: Prevent and Manage VTE (Venous Thromboembolism) Risk  Recent Flowsheet Documentation  Taken 12/7/2023 2105 by Roseline Garcia LPN  Activity Management: activity encouraged  VTE Prevention/Management: (see mar) other (see comments)  Range of Motion: active ROM (range of motion) encouraged  Intervention: Prevent Infection  Recent Flowsheet Documentation  Taken 12/7/2023 2105 by Roseline Garcia LPN  Infection Prevention:   hand hygiene promoted   rest/sleep promoted  Goal: Optimal Comfort and Wellbeing  Intervention: Monitor Pain and Promote Comfort  Recent Flowsheet Documentation  Taken 12/7/2023 2105 by Roseline Garcia LPN  Pain Management Interventions: see MAR  Intervention: Provide Person-Centered Care  Recent Flowsheet Documentation  Taken 12/7/2023 2105 by Roseline Garcia LPN  Trust Relationship/Rapport:   care explained   choices provided         Pt pain controlled, VSS, encourage pt to turn and reposition. Pt resting in bed, is able to make needs known, call light in reach, will continue current POC

## 2023-12-08 NOTE — CONSULTS
"Nutrition Services    Patient Name: Jose Shaikh  YOB: 1960  MRN: [id number]  Admission date: 11/6/2023      CLINICAL NUTRITION ASSESSMENT      Reason for Assessment  Follow-up protocol    H&P:    Past Medical History:   Diagnosis Date    Absence of toe of right foot     Acute osteomyelitis of left calcaneus  08/18/2021    Anxiety and depression     Arthritis     Cancer     Claustrophobia     Corns and callus     Diabetic ulcer of left heel associated with type 2 DM 08/18/2021    Diabetic ulcer of left heel associated with type 2 DM 07/06/2021    Diabetic ulcer of right midfoot associated with type 2 DM 08/18/2021    Difficulty walking     Essential hypertension 08/31/2021    Hammertoe     Hyperlipidemia LDL goal <100 08/31/2021    Ingrown toenail     Obesity     Paroxysmal atrial fibrillation 08/31/2021    Polyneuropathy     Pressure ulcer, stage 1     Tinea unguium     Type 2 diabetes mellitus with polyneuropathy         Current Problems:   Active Hospital Problems    Diagnosis     **Debility         Nutrition/Diet History         Narrative     63-year-old male recently hospitalized September 10, 2023 through November 6, 2023.  Admitted to skilled nursing facility related to generalized weakness and deconditioning, and wound care.  See past medical history above.    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 RD reviewed wound photos, wound improving.     Last documented bowel movement 12/08/23 x 3, small and moderate.      Per nursing documentation, pt with 100% meals, snacks, and supplements. Continue current nutrition intervention.     Anthropometrics        Current Height, Weight Height: 188 cm (74.02\")  Weight: (!) 153 kg (336 lb 13.8 oz)   Current BMI Body mass index is 43.23 kg/m².       Weight Hx  Wt Readings from Last 30 Encounters:   12/08/23 0500 (!) 153 kg (336 lb 13.8 oz)   12/06/23 0500 (!) 154 kg (340 lb 6.2 oz)   12/05/23 0607 (!) 161 kg " (354 lb 15.1 oz)   12/04/23 0500 (!) 161 kg (354 lb 4.5 oz)   12/03/23 0400 (!) 161 kg (354 lb 11.5 oz)   11/21/23 1155 (!) 162 kg (357 lb 12.8 oz)   11/09/23 0500 (!) 160 kg (353 lb 9.9 oz)   11/06/23 1422 (!) 158 kg (348 lb 5.2 oz)   11/02/23 1155 (!) 158 kg (348 lb 15.8 oz)   09/11/23 0030 (!) 151 kg (334 lb)   09/10/23 1844 (!) 154 kg (338 lb 10 oz)   08/30/23 1112 (!) 157 kg (347 lb)   08/01/23 0932 (!) 158 kg (347 lb 10.7 oz)   07/31/23 2347 (!) 163 kg (358 lb 7.5 oz)   06/16/23 2333 (!) 155 kg (342 lb 2.5 oz)   06/16/23 1053 (!) 155 kg (342 lb 3.2 oz)   06/15/23 0505 (!) 149 kg (328 lb 14.4 oz)   06/14/23 0455 (!) 149 kg (328 lb 4.8 oz)   06/13/23 1703 (!) 149 kg (328 lb 11.2 oz)   06/13/23 0600 (!) 170 kg (374 lb 12.5 oz)   06/12/23 0420 (!) 160 kg (352 lb 11.8 oz)   06/11/23 0447 (!) 150 kg (331 lb 5.6 oz)   06/10/23 0500 (!) 150 kg (330 lb 14.6 oz)   06/09/23 0536 (!) 156 kg (343 lb 7.6 oz)   06/08/23 1826 (!) 156 kg (344 lb 11.2 oz)   06/08/23 0522 (!) 158 kg (348 lb 8.8 oz)   06/07/23 0548 (!) 155 kg (342 lb 9.5 oz)   06/06/23 0515 (!) 155 kg (341 lb 14.9 oz)   06/05/23 0445 (!) 155 kg (341 lb 9.6 oz)   06/05/23 0348 (!) 158 kg (349 lb 3.3 oz)   06/04/23 0555 (!) 157 kg (346 lb 3.2 oz)   06/03/23 0539 (!) 158 kg (349 lb)   06/02/23 0548 (!) 163 kg (359 lb 3.2 oz)   06/01/23 0600 (!) 164 kg (362 lb)   05/31/23 0507 (!) 164 kg (362 lb 4.8 oz)   05/30/23 0635 (!) 164 kg (362 lb 7 oz)   05/29/23 0500 (!) 167 kg (368 lb 2.7 oz)   05/28/23 0600 (!) 167 kg (367 lb 11.6 oz)   05/27/23 0600 (!) 167 kg (369 lb 0.8 oz)   05/26/23 0529 (!) 167 kg (369 lb 3.2 oz)   05/25/23 0600 (!) 168 kg (370 lb 3.2 oz)   05/24/23 0600 (!) 166 kg (366 lb 9.6 oz)   05/22/23 0529 (!) 169 kg (373 lb 7.4 oz)   05/21/23 0600 (!) 169 kg (371 lb 12.8 oz)   05/20/23 0600 (!) 171 kg (376 lb 5.2 oz)   05/19/23 0300 (!) 168 kg (370 lb 14.4 oz)   05/18/23 1912 (!) 168 kg (371 lb 7.6 oz)   05/18/23 0600 (!) 169 kg (371 lb 11.1 oz)   05/16/23  0700 (!) 171 kg (377 lb 4.8 oz)   05/14/23 0500 (!) 171 kg (377 lb 3.3 oz)   05/12/23 1143 (!) 170 kg (375 lb)   05/06/23 0258 (!) 170 kg (375 lb 8 oz)   04/19/23 0909 (!) 163 kg (359 lb)   04/03/23 1906 (!) 168 kg (370 lb)   03/27/23 0938 (!) 170 kg (373 lb 10.9 oz)   03/17/23 1153 (!) 168 kg (370 lb)   01/27/23 1501 (!) 168 kg (370 lb)   12/22/22 1501 (!) 171 kg (376 lb)   11/08/22 1035 (!) 161 kg (355 lb)   10/01/22 1141 (!) 164 kg (360 lb 10.8 oz)   05/18/22 1311 (!) 155 kg (341 lb)   03/24/22 1432 (!) 155 kg (341 lb)   03/02/22 1412 (!) 155 kg (341 lb)   01/12/22 1317 (!) 155 kg (341 lb)   12/30/21 1431 (!) 155 kg (341 lb)   12/01/21 1144 (!) 155 kg (341 lb 11.4 oz)   12/01/21 0843 (!) 157 kg (346 lb)   11/22/21 0839 (!) 157 kg (346 lb)   11/15/21 1148 (!) 157 kg (346 lb 12.5 oz)   11/09/21 1139 (!) 157 kg (345 lb 7.4 oz)   11/05/21 1130 (!) 159 kg (351 lb 3.1 oz)   11/02/21 1121 (!) 161 kg (356 lb)   10/27/21 1048 (!) 161 kg (356 lb)   10/21/21 1418 (!) 162 kg (356 lb 14.8 oz)            Wt Change Observation -3.2% x 1 month (since SNF admission)     Estimated/Assessed Needs       Energy Requirements Estimated nutrient needs calculated using adjusted body weight 242 pounds / 110 kg.   EST Needs (kcal/day) 25 kcals per kilogram = 2750 aracelis       Protein Requirements    EST Daily Needs (g/day) 1.0 to 1.2 g/kg = 110 to 132 g of protein       Fluid Requirements     Estimated Needs (mL/day) 25 mL/kg = 2750 mL (11 to 12 cups)     Labs/Medications         Pertinent Labs Reviewed.   Results from last 7 days   Lab Units 12/07/23  0505   SODIUM mmol/L 136   POTASSIUM mmol/L 3.9   CHLORIDE mmol/L 105   CO2 mmol/L 22.5   BUN mg/dL 17   CREATININE mg/dL 0.47*   CALCIUM mg/dL 8.3*   GLUCOSE mg/dL 119*     Results from last 7 days   Lab Units 12/07/23  0505   PHOSPHORUS mg/dL 4.0   HEMOGLOBIN g/dL 10.4*   HEMATOCRIT % 35.4*     COVID19   Date Value Ref Range Status   05/12/2023 Not Detected Not Detected - Ref. Range Final      Lab Results   Component Value Date    HGBA1C 6.60 (H) 04/19/2023         Pertinent Medications Reviewed.     Current Nutrition Orders & Evaluation of Intake       Oral Nutrition     Current PO Diet Diet: Diabetic Diets, High Protein Diet; Consistent Carbohydrate; Texture: Regular Texture (IDDSI 7); Fluid Consistency: Thin (IDDSI 0)   Supplement Orders Placed This Encounter      Dietary Nutrition Supplements Anatoly; (Mix with diet sprite)       Malnutrition Severity Assessment                Nutrition Diagnosis         Nutrition Dx Problem 1 Increased nutrient needs related to increased nutrient needs due to catabolic disease as evidenced by  Significant wound to right foot       Nutrition Intervention         Anatoly BID     Medical Nutrition Therapy/Nutrition Education          Learner     Readiness Patient  Acceptance     Method     Response Explanation and Written Material  Verbalizes understanding     Monitor/Evaluation        Monitor Per protocol, PO intake, Weight, Skin status, POC/GOC     Nutrition Discharge Plan         Pt will require high protein diet to aide in wound healing upon discharge.      Electronically signed by:  Brigitte Mcclellan RD  12/08/23 12:38 EST

## 2023-12-08 NOTE — PLAN OF CARE
Goal Outcome Evaluation:  Plan of Care Reviewed With: patient        Progress: no change  Outcome Evaluation: Resient alert, oriented, able to make needs known to staff. remains bedbound. Can stand at BS ith therapy but has not started walking yet. Is encouraged to take steps but states he can't. mood appears better today. incont. of bowel x 3 this shif. will ask for BP intermittently. Uses urinal independently at BS. Bloodglucose elevated for dinner, most likely due to [employer] fast food (pizza) at 4pm. Also ate dinner tray. medicated for prn pain x1, effective. Resident pleasant and cooperative.

## 2023-12-08 NOTE — THERAPY TREATMENT NOTE
SNF - Physical Therapy Treatment Note  [facility]     Patient Name: Jose Shaikh  : 1960  MRN: [id number]  Today's Date: 2023      Visit Dx:    ICD-10-CM ICD-9-CM   1. Difficulty in walking  R26.2 719.7     Patient Active Problem List   Diagnosis    Diabetic ulcer of left heel associated with type 2 DM    Acute osteomyelitis of left calcaneus     Diabetic ulcer of left heel associated with type 2 DM    Diabetic ulcer of right midfoot associated with type 2 DM    Paroxysmal atrial fibrillation    Essential hypertension    Hyperlipidemia LDL goal <100    Cellulitis and abscess of foot    High alkaline phosphatase level    Osteomyelitis    Onychomycosis    Onychocryptosis    Foot pain, bilateral    Osteomyelitis of foot, right, acute    Cellulitis of right foot    Type 2 diabetes mellitus, with long-term current use of insulin    Class 3 severe obesity due to excess calories with serious comorbidity and body mass index (BMI) of 45.0 to 49.9 in adult    Anxiety disorder, unspecified    Claustrophobia    Dependence on wheelchair    Depression, unspecified    Long term (current) use of anticoagulants    Long term (current) use of oral hypoglycemic drugs    Wound of foot    Non-prs chronic ulcer oth prt r foot limited to brkdwn skin    Orthostatic hypotension    Other chronic osteomyelitis, right ankle and foot    Personal history of nicotine dependence    Thrombocytopenia, unspecified    Unspecified open wound, right foot, initial encounter    Diabetic foot infection    Subacute osteomyelitis of right foot    Right foot pain    Sepsis    Onychomycosis    Foot pain, left    Impaired mobility and ADLs    Absence of toe of right foot    Corns and callosity    Disability of walking    Fracture    Limb swelling    Polyneuropathy    Pressure ulcer, stage 1    Shortness of breath    Generalized weakness    Debility     Past Medical History:   Diagnosis Date    Absence of toe of right foot     Acute osteomyelitis of  left calcaneus  08/18/2021    Anxiety and depression     Arthritis     Cancer     Claustrophobia     Corns and callus     Diabetic ulcer of left heel associated with type 2 DM 08/18/2021    Diabetic ulcer of left heel associated with type 2 DM 07/06/2021    Diabetic ulcer of right midfoot associated with type 2 DM 08/18/2021    Difficulty walking     Essential hypertension 08/31/2021    Hammertoe     Hyperlipidemia LDL goal <100 08/31/2021    Ingrown toenail     Obesity     Paroxysmal atrial fibrillation 08/31/2021    Polyneuropathy     Pressure ulcer, stage 1     Tinea unguium     Type 2 diabetes mellitus with polyneuropathy      Past Surgical History:   Procedure Laterality Date    CYST REMOVAL      center of back; benign    EYE SURGERY      INCISION AND DRAINAGE ABSCESS      back    INCISION AND DRAINAGE LEG Right 12/10/2021    Procedure: INCISION AND DRAINAGE LOWER EXTREMITY;  Surgeon: Ash Leyva DPM;  Location: [facility];  Service: Podiatry;  Laterality: Right;    OTHER SURGICAL HISTORY      Surgical clips left foot    TOE SURGERY Right     Removal of 5th toe    TRANS METATARSAL AMPUTATION Right 12/02/2021    Procedure: AMPUTATION TRANS METATARSAL;  Surgeon: Ash Leyva DPM;  Location: [facility];  Service: Podiatry;  Laterality: Right;    VASCULAR SURGERY      WRIST SURGERY Left     repair of injury       PT Assessment (last 12 hours)       PT Evaluation and Treatment       Row Name 12/08/23 6211          Physical Therapy Time and Intention    Subjective Information complains of;pain  -WM     Document Type therapy note (daily note)  -WM     Mode of Treatment individual therapy;physical therapy  -WM     Patient Effort adequate  -WM     Symptoms Noted During/After Treatment fatigue  -WM       Row Name 12/08/23 5320          Pain Scale: FACES Pre/Post-Treatment    Pain: FACES Scale, Pretreatment 2-->hurts little bit  -WM     Posttreatment Pain Rating 6-->hurts even more  -WM      Pain Location - Side/Orientation Left  -     Pain Location - hip;knee  -       Row Name 12/08/23 1357          Hip (Therapeutic Exercise)    Hip AAROM (Therapeutic Exercise) bilateral;aBduction;aDduction;supine;10 repetitions;2 sets  -     Hip Isometrics (Therapeutic Exercise) bilateral;gluteal sets;supine;10 repetitions;3 second hold;2 sets  -       Row Name 12/08/23 1357          Knee (Therapeutic Exercise)    Knee Isometrics (Therapeutic Exercise) bilateral;quad sets;supine;10 repetitions;3 second hold;2 sets  -     Knee Strengthening (Therapeutic Exercise) bilateral;SAQ (short arc quad);supine;4 lb free weight;20 repititions;SLR (straight leg raise)  SLR  active-assisted  -       Row Name 12/08/23 1357          Ankle (Therapeutic Exercise)    Ankle AROM (Therapeutic Exercise) bilateral;dorsiflexion;plantarflexion;supine;20 repititions  -       Row Name             Wound 11/07/23 1240 Right anterior foot    Wound - Properties Group Placement Date: 11/07/23  - Placement Time: 1240  -FH Side: Right  -FH Orientation: anterior  -FH Location: foot  -FH    Retired Wound - Properties Group Placement Date: 11/07/23  - Placement Time: 1240  -FH Side: Right  -FH Orientation: anterior  -FH Location: foot  -FH    Retired Wound - Properties Group Date first assessed: 11/07/23  - Time first assessed: 1240  -FH Side: Right  -FH Location: foot  -FH      Row Name             Wound 11/07/23 1238 Left lower arm    Wound - Properties Group Placement Date: 11/07/23  - Placement Time: 1238  -FH Side: Left  -FH Orientation: lower  -FH Location: arm  -FH    Retired Wound - Properties Group Placement Date: 11/07/23  - Placement Time: 1238  -FH Side: Left  -FH Orientation: lower  -FH Location: arm  -FH    Retired Wound - Properties Group Date first assessed: 11/07/23  - Time first assessed: 1238  -FH Side: Left  -FH Location: arm  -FH      Row Name 12/08/23 1357          Positioning and Restraints     Pre-Treatment Position in bed  -WM     Post Treatment Position bed  -WM     In Bed supine;call light within reach;encouraged to call for assist  -WM       Row Name 12/08/23 1357          Progress Summary (PT)    Progress Toward Functional Goals (PT) progress toward functional goals is fair  -WM               User Key  (r) = Recorded By, (t) = Taken By, (c) = Cosigned By      Initials Name Provider Type     Carlos Cagle, RN Registered Nurse    Carson Johnson PTA Physical Therapist Assistant                  Section G              Section GG                       Physical Therapy Education       Title: PT OT SLP Therapies (Done)       Topic: Physical Therapy (Done)       Point: Mobility training (Done)       Learning Progress Summary             Patient Acceptance, E,TB, VU by  at 11/30/2023 0420    Acceptance, E,TB, VU by MOE at 11/8/2023 1341                         Point: Precautions (Done)       Learning Progress Summary             Patient Acceptance, E,TB, VU by  at 11/30/2023 0420    Acceptance, E,TB, VU by MOE at 11/8/2023 1341                                         User Key       Initials Effective Dates Name Provider Type Discipline     06/08/21 -  Bharath Berg RN Registered Nurse Nurse    MOE 06/03/21 -  Merlin Rao, PT Physical Therapist PT                  PT Recommendation and Plan     Progress Summary (PT)  Progress Toward Functional Goals (PT): progress toward functional goals is fair  Daily Progress Summary (PT): Pt stood  3 times with longest for 15 seconds. He is not able to advance RLE to ambulate. Pts left hip and knee have occasional popping sound with ROM.   Outcome Measures       Row Name 12/08/23 1402 12/07/23 1128 12/07/23 0900       How much help from another person do you currently need...    Turning from your back to your side while in flat bed without using bedrails? 3  -WM 3  -WM 3  -MOE    Moving from lying on back to sitting on the side of a flat bed without  bedrails? 3  -WM 3  -WM 3  -MOE    Moving to and from a bed to a chair (including a wheelchair)? 1  -WM 1  -WM 1  -MOE    Standing up from a chair using your arms (e.g., wheelchair, bedside chair)? 2  -WM 2  -WM 2  -MOE    Climbing 3-5 steps with a railing? 1  -WM 1  -WM 1  -MOE    To walk in hospital room? 1  -WM 1  -WM 1  -MOE    AM-PAC 6 Clicks Score (PT) 11  -WM 11  -WM 11  -MOE    Highest Level of Mobility Goal 4 --> Transfer to chair/commode  -WM 4 --> Transfer to chair/commode  -WM 4 --> Transfer to chair/commode  -MOE       Functional Assessment    Outcome Measure Options -- -- AM-PAC 6 Clicks Basic Mobility (PT)  -MOE      Row Name 12/06/23 1019 12/05/23 1443          How much help from another person do you currently need...    Turning from your back to your side while in flat bed without using bedrails? 3  -WM 3  -WM     Moving from lying on back to sitting on the side of a flat bed without bedrails? 3  -WM 3  -WM     Moving to and from a bed to a chair (including a wheelchair)? 1  -WM 1  -WM     Standing up from a chair using your arms (e.g., wheelchair, bedside chair)? 2  -WM 2  -WM     Climbing 3-5 steps with a railing? 1  -WM 1  -WM     To walk in hospital room? 1  -WM 1  -WM     AM-PAC 6 Clicks Score (PT) 11  -WM 11  -WM     Highest Level of Mobility Goal 4 --> Transfer to chair/commode  -WM 4 --> Transfer to chair/commode  -WM               User Key  (r) = Recorded By, (t) = Taken By, (c) = Cosigned By      Initials Name Provider Type    Carson Johnson PTA Physical Therapist Assistant    Merlin De La O, PT Physical Therapist                     Time Calculation:    PT Charges       Row Name 12/08/23 1357 12/08/23 0731          Time Calculation    PT Received On 12/08/23  -WM --     PT Goal Re-Cert Due Date -- 01/06/24  -MOE        Timed Charges    20292 - PT Therapeutic Exercise Minutes 16  -WM --        SNF Physical Therapy Minutes    Skilled Minutes- PT 16 min  -WM --        Total Minutes     Timed Charges Total Minutes 16  -WM --      Total Minutes 16  -WM --               User Key  (r) = Recorded By, (t) = Taken By, (c) = Cosigned By      Initials Name Provider Type    Carson Johnson PTA Physical Therapist Assistant    Merlin De La O, PT Physical Therapist                  Therapy Charges for Today       Code Description Service Date Service Provider Modifiers Qty    [id number]  PT THER PROC EA 15 MIN 12/7/2023 Carson Inman PTA GP 1    [id number]  PT THERAPEUTIC ACT EA 15 MIN 12/7/2023 Carson Inman PTA GP 2            PT G-Codes  Outcome Measure Options: AM-PAC 6 Clicks Daily Activity (OT), Optimal Instrument  AM-PAC 6 Clicks Score (PT): 11  AM-PAC 6 Clicks Score (OT): 14    Carson Inman PTA  12/8/2023

## 2023-12-09 LAB
GLUCOSE BLDC GLUCOMTR-MCNC: 102 MG/DL (ref 70–99)
GLUCOSE BLDC GLUCOMTR-MCNC: 123 MG/DL (ref 70–99)
GLUCOSE BLDC GLUCOMTR-MCNC: 126 MG/DL (ref 70–99)
GLUCOSE BLDC GLUCOMTR-MCNC: 177 MG/DL (ref 70–99)

## 2023-12-09 PROCEDURE — 63710000001 INSULIN DETEMIR PER 5 UNITS: Performed by: PHYSICIAN ASSISTANT

## 2023-12-09 PROCEDURE — 82948 REAGENT STRIP/BLOOD GLUCOSE: CPT

## 2023-12-09 PROCEDURE — 63710000001 INSULIN LISPRO (HUMAN) PER 5 UNITS: Performed by: INTERNAL MEDICINE

## 2023-12-09 PROCEDURE — 97110 THERAPEUTIC EXERCISES: CPT

## 2023-12-09 RX ADMIN — APIXABAN 5 MG: 5 TABLET, FILM COATED ORAL at 08:55

## 2023-12-09 RX ADMIN — OXYCODONE AND ACETAMINOPHEN 1 TABLET: 7.5; 325 TABLET ORAL at 14:15

## 2023-12-09 RX ADMIN — INSULIN DETEMIR 70 UNITS: 100 INJECTION, SOLUTION SUBCUTANEOUS at 08:55

## 2023-12-09 RX ADMIN — EMPAGLIFLOZIN 10 MG: 10 TABLET, FILM COATED ORAL at 08:55

## 2023-12-09 RX ADMIN — PREGABALIN 100 MG: 100 CAPSULE ORAL at 17:10

## 2023-12-09 RX ADMIN — HYDROCORTISONE 1 APPLICATION: 1 OINTMENT TOPICAL at 08:54

## 2023-12-09 RX ADMIN — OXYCODONE AND ACETAMINOPHEN 1 TABLET: 7.5; 325 TABLET ORAL at 07:50

## 2023-12-09 RX ADMIN — HYDROCORTISONE 1 APPLICATION: 1 OINTMENT TOPICAL at 17:09

## 2023-12-09 RX ADMIN — OXYCODONE AND ACETAMINOPHEN 1 TABLET: 7.5; 325 TABLET ORAL at 01:33

## 2023-12-09 RX ADMIN — LISINOPRIL 2.5 MG: 2.5 TABLET ORAL at 08:56

## 2023-12-09 RX ADMIN — FERROUS SULFATE TAB 325 MG (65 MG ELEMENTAL FE) 325 MG: 325 (65 FE) TAB at 07:51

## 2023-12-09 RX ADMIN — OXYCODONE AND ACETAMINOPHEN 1 TABLET: 7.5; 325 TABLET ORAL at 20:39

## 2023-12-09 RX ADMIN — APIXABAN 5 MG: 5 TABLET, FILM COATED ORAL at 20:40

## 2023-12-09 RX ADMIN — Medication 10 MG: at 20:40

## 2023-12-09 RX ADMIN — ATORVASTATIN CALCIUM 10 MG: 10 TABLET, FILM COATED ORAL at 20:40

## 2023-12-09 RX ADMIN — INSULIN DETEMIR 60 UNITS: 100 INJECTION, SOLUTION SUBCUTANEOUS at 20:39

## 2023-12-09 RX ADMIN — INSULIN LISPRO 4 UNITS: 100 INJECTION, SOLUTION INTRAVENOUS; SUBCUTANEOUS at 20:40

## 2023-12-09 RX ADMIN — HYDROCORTISONE 1 APPLICATION: 1 OINTMENT TOPICAL at 20:40

## 2023-12-09 RX ADMIN — PREGABALIN 100 MG: 100 CAPSULE ORAL at 08:56

## 2023-12-09 RX ADMIN — Medication 250 MG: at 08:55

## 2023-12-09 RX ADMIN — Medication 250 MG: at 20:40

## 2023-12-09 RX ADMIN — CYANOCOBALAMIN TAB 500 MCG 1000 MCG: 500 TAB at 08:56

## 2023-12-09 RX ADMIN — PREGABALIN 100 MG: 100 CAPSULE ORAL at 20:39

## 2023-12-09 RX ADMIN — BACLOFEN 10 MG: 10 TABLET ORAL at 05:26

## 2023-12-09 RX ADMIN — SERTRALINE HYDROCHLORIDE 25 MG: 25 TABLET ORAL at 20:40

## 2023-12-09 NOTE — PLAN OF CARE
Goal Outcome Evaluation:  Plan of Care Reviewed With: patient        Progress: no change  Outcome Evaluation: Alert and oriented X 4. Remains bedbound. Incontinent of bowel. Repositions with one to two person assistance with use of trapeze. Percocet administered for bilateral lower leg, hip, and buttocks pain. Continue plan of care.

## 2023-12-09 NOTE — THERAPY TREATMENT NOTE
SNF - Physical Therapy Treatment Note  KEO Dominguez     Patient Name: Jose Shaikh  : 1960  MRN: [id number]  Today's Date: 2023      Visit Dx:    ICD-10-CM ICD-9-CM   1. Difficulty in walking  R26.2 719.7     Patient Active Problem List   Diagnosis    Diabetic ulcer of left heel associated with type 2 DM    Acute osteomyelitis of left calcaneus     Diabetic ulcer of left heel associated with type 2 DM    Diabetic ulcer of right midfoot associated with type 2 DM    Paroxysmal atrial fibrillation    Essential hypertension    Hyperlipidemia LDL goal <100    Cellulitis and abscess of foot    High alkaline phosphatase level    Osteomyelitis    Onychomycosis    Onychocryptosis    Foot pain, bilateral    Osteomyelitis of foot, right, acute    Cellulitis of right foot    Type 2 diabetes mellitus, with long-term current use of insulin    Class 3 severe obesity due to excess calories with serious comorbidity and body mass index (BMI) of 45.0 to 49.9 in adult    Anxiety disorder, unspecified    Claustrophobia    Dependence on wheelchair    Depression, unspecified    Long term (current) use of anticoagulants    Long term (current) use of oral hypoglycemic drugs    Wound of foot    Non-prs chronic ulcer oth prt r foot limited to brkdwn skin    Orthostatic hypotension    Other chronic osteomyelitis, right ankle and foot    Personal history of nicotine dependence    Thrombocytopenia, unspecified    Unspecified open wound, right foot, initial encounter    Diabetic foot infection    Subacute osteomyelitis of right foot    Right foot pain    Sepsis    Onychomycosis    Foot pain, left    Impaired mobility and ADLs    Absence of toe of right foot    Corns and callosity    Disability of walking    Fracture    Limb swelling    Polyneuropathy    Pressure ulcer, stage 1    Shortness of breath    Generalized weakness    Debility     Past Medical History:   Diagnosis Date    Absence of toe of right foot     Acute osteomyelitis of  left calcaneus  08/18/2021    Anxiety and depression     Arthritis     Cancer     Claustrophobia     Corns and callus     Diabetic ulcer of left heel associated with type 2 DM 08/18/2021    Diabetic ulcer of left heel associated with type 2 DM 07/06/2021    Diabetic ulcer of right midfoot associated with type 2 DM 08/18/2021    Difficulty walking     Essential hypertension 08/31/2021    Hammertoe     Hyperlipidemia LDL goal <100 08/31/2021    Ingrown toenail     Obesity     Paroxysmal atrial fibrillation 08/31/2021    Polyneuropathy     Pressure ulcer, stage 1     Tinea unguium     Type 2 diabetes mellitus with polyneuropathy      Past Surgical History:   Procedure Laterality Date    CYST REMOVAL      center of back; benign    EYE SURGERY      INCISION AND DRAINAGE ABSCESS      back    INCISION AND DRAINAGE LEG Right 12/10/2021    Procedure: INCISION AND DRAINAGE LOWER EXTREMITY;  Surgeon: Ash Leyva DPM;  Location: [facility];  Service: Podiatry;  Laterality: Right;    OTHER SURGICAL HISTORY      Surgical clips left foot    TOE SURGERY Right     Removal of 5th toe    TRANS METATARSAL AMPUTATION Right 12/02/2021    Procedure: AMPUTATION TRANS METATARSAL;  Surgeon: Ash Leyva DPM;  Location: [facility];  Service: Podiatry;  Laterality: Right;    VASCULAR SURGERY      WRIST SURGERY Left     repair of injury       PT Assessment (last 12 hours)       PT Evaluation and Treatment       Row Name 12/09/23 0644          Physical Therapy Time and Intention    Subjective Information complains of;pain  -WM     Document Type therapy note (daily note)  -WM     Mode of Treatment individual therapy;physical therapy  -WM     Patient Effort good  -WM     Symptoms Noted During/After Treatment fatigue;increased pain  -WM       Row Name 12/09/23 0644          Pain Scale: FACES Pre/Post-Treatment    Pain: FACES Scale, Pretreatment 2-->hurts little bit  -WM     Posttreatment Pain Rating 6-->hurts even  more  -     Pain Location - Side/Orientation Left  -     Pain Location - hip;knee  -       Row Name 12/09/23 0644          Hip (Therapeutic Exercise)    Hip AAROM (Therapeutic Exercise) bilateral;aBduction;aDduction;supine;10 repetitions;2 sets  -     Hip Isometrics (Therapeutic Exercise) bilateral;gluteal sets;supine;10 repetitions;3 second hold;2 sets  -       Row Name 12/09/23 0644          Knee (Therapeutic Exercise)    Knee Isometrics (Therapeutic Exercise) bilateral;quad sets;supine;10 repetitions;2 sets  -     Knee Strengthening (Therapeutic Exercise) bilateral;SAQ (short arc quad);supine;4 lb free weight;20 repititions  Active-assisted SLR  -       Row Name 12/09/23 0644          Ankle (Therapeutic Exercise)    Ankle AROM (Therapeutic Exercise) bilateral;supine;20 repititions  -       Row Name             Wound 11/07/23 1240 Right anterior foot    Wound - Properties Group Placement Date: 11/07/23  - Placement Time: 1240  -FH Side: Right  -FH Orientation: anterior  -FH Location: foot  -FH    Retired Wound - Properties Group Placement Date: 11/07/23  - Placement Time: 1240  -FH Side: Right  -FH Orientation: anterior  -FH Location: foot  -FH    Retired Wound - Properties Group Date first assessed: 11/07/23  - Time first assessed: 1240  -FH Side: Right  -FH Location: foot  -FH      Row Name             Wound 11/07/23 1238 Left lower arm    Wound - Properties Group Placement Date: 11/07/23  - Placement Time: 1238  -FH Side: Left  -FH Orientation: lower  -FH Location: arm  -FH    Retired Wound - Properties Group Placement Date: 11/07/23  - Placement Time: 1238  -FH Side: Left  -FH Orientation: lower  -FH Location: arm  -FH    Retired Wound - Properties Group Date first assessed: 11/07/23  - Time first assessed: 1238  -FH Side: Left  -FH Location: arm  -FH      Row Name 12/09/23 0644          Positioning and Restraints    Pre-Treatment Position in bed  -     Post Treatment Position bed  " -WM     In Bed supine;call light within reach;encouraged to call for assist  -WM       Row Name 12/09/23 0644          Progress Summary (PT)    Progress Toward Functional Goals (PT) progress toward functional goals is fair  -WM     Daily Progress Summary (PT) Pt participated in BLE exercises. He declined all transfers. He continues to have \"popping\" from his left hip and knee with ROM.  -WM               User Key  (r) = Recorded By, (t) = Taken By, (c) = Cosigned By      Initials Name Provider Type     Carlos Cagle, RN Registered Nurse    Carson Johnson PTA Physical Therapist Assistant                  Section G              Section GG                       Physical Therapy Education       Title: PT OT SLP Therapies (Done)       Topic: Physical Therapy (Done)       Point: Mobility training (Done)       Learning Progress Summary             Patient Acceptance, E,TB, VU by  at 11/30/2023 0420    Acceptance, E,TB, VU by MOE at 11/8/2023 1341                         Point: Precautions (Done)       Learning Progress Summary             Patient Acceptance, E,TB, VU by  at 11/30/2023 0420    Acceptance, E,TB, VU by MOE at 11/8/2023 1341                                         User Key       Initials Effective Dates Name Provider Type Discipline     06/08/21 -  Bharath Berg, RN Registered Nurse Nurse    MOE 06/03/21 -  Merlin Rao, PT Physical Therapist PT                  PT Recommendation and Plan     Progress Summary (PT)  Progress Toward Functional Goals (PT): progress toward functional goals is fair  Daily Progress Summary (PT): Pt participated in BLE exercises. He declined all transfers. He continues to have \"popping\" from his left hip and knee with ROM.   Outcome Measures       Row Name 12/09/23 0650 12/08/23 1402 12/07/23 1128       How much help from another person do you currently need...    Turning from your back to your side while in flat bed without using bedrails? 3  -WM 3  -WM 3  -WM "    Moving from lying on back to sitting on the side of a flat bed without bedrails? 3  -WM 3  -WM 3  -WM    Moving to and from a bed to a chair (including a wheelchair)? 1  -WM 1  -WM 1  -WM    Standing up from a chair using your arms (e.g., wheelchair, bedside chair)? 2  -WM 2  -WM 2  -WM    Climbing 3-5 steps with a railing? 1  -WM 1  -WM 1  -WM    To walk in hospital room? 1  -WM 1  -WM 1  -WM    AM-PAC 6 Clicks Score (PT) 11  -WM 11  -WM 11  -WM    Highest Level of Mobility Goal 4 --> Transfer to chair/commode  -WM 4 --> Transfer to chair/commode  -WM 4 --> Transfer to chair/commode  -WM      Row Name 12/07/23 0900 12/06/23 1019          How much help from another person do you currently need...    Turning from your back to your side while in flat bed without using bedrails? 3  -MOE 3  -WM     Moving from lying on back to sitting on the side of a flat bed without bedrails? 3  -MOE 3  -WM     Moving to and from a bed to a chair (including a wheelchair)? 1  -MOE 1  -WM     Standing up from a chair using your arms (e.g., wheelchair, bedside chair)? 2  -MOE 2  -WM     Climbing 3-5 steps with a railing? 1  -MOE 1  -WM     To walk in hospital room? 1  -MOE 1  -WM     AM-PAC 6 Clicks Score (PT) 11  -MOE 11  -WM     Highest Level of Mobility Goal 4 --> Transfer to chair/commode  -MOE 4 --> Transfer to chair/commode  -WM        Functional Assessment    Outcome Measure Options AM-PAC 6 Clicks Basic Mobility (PT)  -MOE --               User Key  (r) = Recorded By, (t) = Taken By, (c) = Cosigned By      Initials Name Provider Type    Carson Johnson, PTA Physical Therapist Assistant    Merlin De La O, PT Physical Therapist                     Time Calculation:    PT Charges       Row Name 12/09/23 0644             Time Calculation    PT Received On 12/09/23  -WM         Timed Charges    79881 - PT Therapeutic Exercise Minutes 16  -WM         SNF Physical Therapy Minutes    Skilled Minutes- PT 16 min  -WM         Total  Minutes    Timed Charges Total Minutes 16  -WM       Total Minutes 16  -WM                User Key  (r) = Recorded By, (t) = Taken By, (c) = Cosigned By      Initials Name Provider Type     Carson Inman PTA Physical Therapist Assistant                  Therapy Charges for Today       Code Description Service Date Service Provider Modifiers Qty    84386727903  PT THER PROC EA 15 MIN 12/8/2023 Carson Inman PTA GP 1            PT G-Codes  Outcome Measure Options: AM-PAC 6 Clicks Daily Activity (OT), Optimal Instrument  AM-PAC 6 Clicks Score (PT): 11  AM-PAC 6 Clicks Score (OT): 14    Carson Inman PTA  12/9/2023

## 2023-12-09 NOTE — PLAN OF CARE
Goal Outcome Evaluation:              Outcome Evaluation: Plan of care continues at this time, patient is alert and oriented, patient uses bedpan, has episodes of stool incontinence at times. Patient uses urinal. Patient declines repositioning with staff assist but uses trapeze overhead to reposition. See eMAR for medication administration, medicated 2 x this shift for L hip pain. Dressing changed per order to R foot. Patient is able to make needs known. Call light in reach.

## 2023-12-10 LAB
GLUCOSE BLDC GLUCOMTR-MCNC: 126 MG/DL (ref 70–99)
GLUCOSE BLDC GLUCOMTR-MCNC: 140 MG/DL (ref 70–99)
GLUCOSE BLDC GLUCOMTR-MCNC: 176 MG/DL (ref 70–99)
GLUCOSE BLDC GLUCOMTR-MCNC: 185 MG/DL (ref 70–99)

## 2023-12-10 PROCEDURE — 82948 REAGENT STRIP/BLOOD GLUCOSE: CPT

## 2023-12-10 PROCEDURE — 63710000001 INSULIN DETEMIR PER 5 UNITS: Performed by: PHYSICIAN ASSISTANT

## 2023-12-10 PROCEDURE — 63710000001 INSULIN LISPRO (HUMAN) PER 5 UNITS: Performed by: INTERNAL MEDICINE

## 2023-12-10 RX ADMIN — FERROUS SULFATE TAB 325 MG (65 MG ELEMENTAL FE) 325 MG: 325 (65 FE) TAB at 07:32

## 2023-12-10 RX ADMIN — APIXABAN 5 MG: 5 TABLET, FILM COATED ORAL at 20:00

## 2023-12-10 RX ADMIN — OXYCODONE AND ACETAMINOPHEN 1 TABLET: 7.5; 325 TABLET ORAL at 23:46

## 2023-12-10 RX ADMIN — INSULIN DETEMIR 60 UNITS: 100 INJECTION, SOLUTION SUBCUTANEOUS at 20:01

## 2023-12-10 RX ADMIN — PREGABALIN 100 MG: 100 CAPSULE ORAL at 09:30

## 2023-12-10 RX ADMIN — INSULIN DETEMIR 70 UNITS: 100 INJECTION, SOLUTION SUBCUTANEOUS at 09:31

## 2023-12-10 RX ADMIN — INSULIN LISPRO 4 UNITS: 100 INJECTION, SOLUTION INTRAVENOUS; SUBCUTANEOUS at 12:22

## 2023-12-10 RX ADMIN — Medication 10 MG: at 20:00

## 2023-12-10 RX ADMIN — PREGABALIN 100 MG: 100 CAPSULE ORAL at 16:20

## 2023-12-10 RX ADMIN — HYDROCORTISONE 1 APPLICATION: 1 OINTMENT TOPICAL at 20:03

## 2023-12-10 RX ADMIN — SERTRALINE HYDROCHLORIDE 25 MG: 25 TABLET ORAL at 20:28

## 2023-12-10 RX ADMIN — LISINOPRIL 2.5 MG: 2.5 TABLET ORAL at 09:30

## 2023-12-10 RX ADMIN — Medication 250 MG: at 20:01

## 2023-12-10 RX ADMIN — PREGABALIN 100 MG: 100 CAPSULE ORAL at 20:01

## 2023-12-10 RX ADMIN — HYDROCORTISONE 1 APPLICATION: 1 OINTMENT TOPICAL at 16:20

## 2023-12-10 RX ADMIN — CYANOCOBALAMIN TAB 500 MCG 1000 MCG: 500 TAB at 09:31

## 2023-12-10 RX ADMIN — INSULIN LISPRO 4 UNITS: 100 INJECTION, SOLUTION INTRAVENOUS; SUBCUTANEOUS at 20:02

## 2023-12-10 RX ADMIN — HYDROCORTISONE 1 APPLICATION: 1 OINTMENT TOPICAL at 09:31

## 2023-12-10 RX ADMIN — EMPAGLIFLOZIN 10 MG: 10 TABLET, FILM COATED ORAL at 09:31

## 2023-12-10 RX ADMIN — Medication 250 MG: at 09:31

## 2023-12-10 RX ADMIN — BACLOFEN 10 MG: 10 TABLET ORAL at 20:00

## 2023-12-10 RX ADMIN — OXYCODONE AND ACETAMINOPHEN 1 TABLET: 7.5; 325 TABLET ORAL at 10:32

## 2023-12-10 RX ADMIN — ATORVASTATIN CALCIUM 10 MG: 10 TABLET, FILM COATED ORAL at 20:00

## 2023-12-10 RX ADMIN — APIXABAN 5 MG: 5 TABLET, FILM COATED ORAL at 09:31

## 2023-12-10 RX ADMIN — OXYCODONE AND ACETAMINOPHEN 1 TABLET: 7.5; 325 TABLET ORAL at 03:47

## 2023-12-10 RX ADMIN — OXYCODONE AND ACETAMINOPHEN 1 TABLET: 7.5; 325 TABLET ORAL at 17:15

## 2023-12-10 NOTE — PLAN OF CARE
Goal Outcome Evaluation:              Outcome Evaluation: POC continues at this time. Patient is alert and oriented, used bedpan for BM and uses urinal. No significant changes this shift, See eMAR for medication administration details, medicated for L hip pain PRN. Patient is able to make needs known, call light in reach.

## 2023-12-10 NOTE — PLAN OF CARE
Goal Outcome Evaluation:  Plan of Care Reviewed With: patient        Progress: no change  Outcome Evaluation: Vital signs stable. Alert and oriented. Used bedpan and urinal during night. Was also incontinent of stool. No significant change. Percocet administered X 2 for left hip pain. Continue plan of care.

## 2023-12-11 LAB
GLUCOSE BLDC GLUCOMTR-MCNC: 123 MG/DL (ref 70–99)
GLUCOSE BLDC GLUCOMTR-MCNC: 146 MG/DL (ref 70–99)
GLUCOSE BLDC GLUCOMTR-MCNC: 183 MG/DL (ref 70–99)
GLUCOSE BLDC GLUCOMTR-MCNC: 95 MG/DL (ref 70–99)

## 2023-12-11 PROCEDURE — 97110 THERAPEUTIC EXERCISES: CPT

## 2023-12-11 PROCEDURE — 82948 REAGENT STRIP/BLOOD GLUCOSE: CPT

## 2023-12-11 PROCEDURE — 63710000001 INSULIN LISPRO (HUMAN) PER 5 UNITS: Performed by: INTERNAL MEDICINE

## 2023-12-11 PROCEDURE — 97530 THERAPEUTIC ACTIVITIES: CPT

## 2023-12-11 PROCEDURE — 63710000001 INSULIN DETEMIR PER 5 UNITS: Performed by: PHYSICIAN ASSISTANT

## 2023-12-11 RX ADMIN — ATORVASTATIN CALCIUM 10 MG: 10 TABLET, FILM COATED ORAL at 20:46

## 2023-12-11 RX ADMIN — INSULIN DETEMIR 60 UNITS: 100 INJECTION, SOLUTION SUBCUTANEOUS at 20:46

## 2023-12-11 RX ADMIN — APIXABAN 5 MG: 5 TABLET, FILM COATED ORAL at 08:13

## 2023-12-11 RX ADMIN — EMPAGLIFLOZIN 10 MG: 10 TABLET, FILM COATED ORAL at 08:13

## 2023-12-11 RX ADMIN — BACLOFEN 10 MG: 10 TABLET ORAL at 13:49

## 2023-12-11 RX ADMIN — Medication 250 MG: at 08:13

## 2023-12-11 RX ADMIN — SERTRALINE HYDROCHLORIDE 25 MG: 25 TABLET ORAL at 20:53

## 2023-12-11 RX ADMIN — LISINOPRIL 2.5 MG: 2.5 TABLET ORAL at 08:13

## 2023-12-11 RX ADMIN — BACLOFEN 10 MG: 10 TABLET ORAL at 23:44

## 2023-12-11 RX ADMIN — Medication 10 MG: at 20:46

## 2023-12-11 RX ADMIN — INSULIN DETEMIR 70 UNITS: 100 INJECTION, SOLUTION SUBCUTANEOUS at 08:12

## 2023-12-11 RX ADMIN — PREGABALIN 100 MG: 100 CAPSULE ORAL at 16:26

## 2023-12-11 RX ADMIN — PREGABALIN 100 MG: 100 CAPSULE ORAL at 20:46

## 2023-12-11 RX ADMIN — HYDROCORTISONE 1 APPLICATION: 1 OINTMENT TOPICAL at 20:49

## 2023-12-11 RX ADMIN — APIXABAN 5 MG: 5 TABLET, FILM COATED ORAL at 20:46

## 2023-12-11 RX ADMIN — HYDROCORTISONE 1 APPLICATION: 1 OINTMENT TOPICAL at 08:13

## 2023-12-11 RX ADMIN — PREGABALIN 100 MG: 100 CAPSULE ORAL at 08:13

## 2023-12-11 RX ADMIN — CYANOCOBALAMIN TAB 500 MCG 1000 MCG: 500 TAB at 08:13

## 2023-12-11 RX ADMIN — HYDROCORTISONE 1 APPLICATION: 1 OINTMENT TOPICAL at 16:27

## 2023-12-11 RX ADMIN — OXYCODONE AND ACETAMINOPHEN 1 TABLET: 7.5; 325 TABLET ORAL at 20:46

## 2023-12-11 RX ADMIN — Medication 250 MG: at 20:46

## 2023-12-11 RX ADMIN — INSULIN LISPRO 4 UNITS: 100 INJECTION, SOLUTION INTRAVENOUS; SUBCUTANEOUS at 20:46

## 2023-12-11 RX ADMIN — FERROUS SULFATE TAB 325 MG (65 MG ELEMENTAL FE) 325 MG: 325 (65 FE) TAB at 08:13

## 2023-12-11 RX ADMIN — OXYCODONE AND ACETAMINOPHEN 1 TABLET: 7.5; 325 TABLET ORAL at 11:36

## 2023-12-11 NOTE — PLAN OF CARE
Goal Outcome Evaluation:  Plan of Care Reviewed With: patient        Progress: no change  Outcome Evaluation: Alert and oriented. Vital signs stable. No significant change. PRN percocet and baclofen administered X 1.

## 2023-12-11 NOTE — THERAPY TREATMENT NOTE
Patient Name: Jose Shaikh  : 1960    MRN: [id number]                              Today's Date: 2023       Admit Date: 2023    Visit Dx:     ICD-10-CM ICD-9-CM   1. Difficulty in walking  R26.2 719.7     Patient Active Problem List   Diagnosis    Diabetic ulcer of left heel associated with type 2 DM    Acute osteomyelitis of left calcaneus     Diabetic ulcer of left heel associated with type 2 DM    Diabetic ulcer of right midfoot associated with type 2 DM    Paroxysmal atrial fibrillation    Essential hypertension    Hyperlipidemia LDL goal <100    Cellulitis and abscess of foot    High alkaline phosphatase level    Osteomyelitis    Onychomycosis    Onychocryptosis    Foot pain, bilateral    Osteomyelitis of foot, right, acute    Cellulitis of right foot    Type 2 diabetes mellitus, with long-term current use of insulin    Class 3 severe obesity due to excess calories with serious comorbidity and body mass index (BMI) of 45.0 to 49.9 in adult    Anxiety disorder, unspecified    Claustrophobia    Dependence on wheelchair    Depression, unspecified    Long term (current) use of anticoagulants    Long term (current) use of oral hypoglycemic drugs    Wound of foot    Non-prs chronic ulcer oth prt r foot limited to brkdwn skin    Orthostatic hypotension    Other chronic osteomyelitis, right ankle and foot    Personal history of nicotine dependence    Thrombocytopenia, unspecified    Unspecified open wound, right foot, initial encounter    Diabetic foot infection    Subacute osteomyelitis of right foot    Right foot pain    Sepsis    Onychomycosis    Foot pain, left    Impaired mobility and ADLs    Absence of toe of right foot    Corns and callosity    Disability of walking    Fracture    Limb swelling    Polyneuropathy    Pressure ulcer, stage 1    Shortness of breath    Generalized weakness    Debility     Past Medical History:   Diagnosis Date    Absence of toe of right foot     Acute  osteomyelitis of left calcaneus  08/18/2021    Anxiety and depression     Arthritis     Cancer     Claustrophobia     Corns and callus     Diabetic ulcer of left heel associated with type 2 DM 08/18/2021    Diabetic ulcer of left heel associated with type 2 DM 07/06/2021    Diabetic ulcer of right midfoot associated with type 2 DM 08/18/2021    Difficulty walking     Essential hypertension 08/31/2021    Hammertoe     Hyperlipidemia LDL goal <100 08/31/2021    Ingrown toenail     Obesity     Paroxysmal atrial fibrillation 08/31/2021    Polyneuropathy     Pressure ulcer, stage 1     Tinea unguium     Type 2 diabetes mellitus with polyneuropathy      Past Surgical History:   Procedure Laterality Date    CYST REMOVAL      center of back; benign    EYE SURGERY      INCISION AND DRAINAGE ABSCESS      back    INCISION AND DRAINAGE LEG Right 12/10/2021    Procedure: INCISION AND DRAINAGE LOWER EXTREMITY;  Surgeon: Ash Leyva DPM;  Location: [facility]  Service: Podiatry;  Laterality: Right;    OTHER SURGICAL HISTORY      Surgical clips left foot    TOE SURGERY Right     Removal of 5th toe    TRANS METATARSAL AMPUTATION Right 12/02/2021    Procedure: AMPUTATION TRANS METATARSAL;  Surgeon: Ash Leyva DPM;  Location: [facility];  Service: Podiatry;  Laterality: Right;    VASCULAR SURGERY      WRIST SURGERY Left     repair of injury      General Information       Row Name 12/11/23 1049          OT Time and Intention    Document Type therapy note (daily note)  -PG     Mode of Treatment individual therapy;occupational therapy  -PG               User Key  (r) = Recorded By, (t) = Taken By, (c) = Cosigned By      Initials Name Provider Type    PG Kodak Matos OT Occupational Therapist                     Mobility/ADL's       Row Name 12/11/23 1647          Sit-Stand Transfer    Sit-Stand Marcell (Transfers) moderate assist (50% patient effort);2 person assist  -PG     Assistive Device  (Sit-Stand Transfers) bariatric;walker, front-wheeled  -PG       Row Name 12/11/23 1049          Stand-Sit Transfer    Stand-Sit Norton (Transfers) moderate assist (50% patient effort);2 person assist  -PG     Assistive Device (Stand-Sit Transfers) bariatric;walker, front-wheeled  -PG               User Key  (r) = Recorded By, (t) = Taken By, (c) = Cosigned By      Initials Name Provider Type    Kodak Velazco OT Occupational Therapist                   Obj/Interventions       Row Name 12/11/23 1050          Shoulder (Therapeutic Exercise)    Shoulder (Therapeutic Exercise) strengthening exercise  -PG     Shoulder Strengthening (Therapeutic Exercise) 30 repititions;5 lb free weight  -PG       Row Name 12/11/23 1050          Elbow/Forearm (Therapeutic Exercise)    Elbow/Forearm (Therapeutic Exercise) strengthening exercise  -PG     Elbow/Forearm Strengthening (Therapeutic Exercise) 5 lb free weight;30 repititions  -PG       Row Name 12/11/23 1050          Motor Skills    Therapeutic Exercise shoulder;elbow/forearm  -PG               User Key  (r) = Recorded By, (t) = Taken By, (c) = Cosigned By      Initials Name Provider Type    Kodak Velazco OT Occupational Therapist                   Goals/Plan    No documentation.                  Clinical Impression       Row Name 12/11/23 1050          Plan of Care Review    Progress no change  -PG               User Key  (r) = Recorded By, (t) = Taken By, (c) = Cosigned By      Initials Name Provider Type    Kodak Velazco OT Occupational Therapist                   Outcome Measures       Row Name 12/11/23 1050          How much help from another is currently needed...    Putting on and taking off regular lower body clothing? 1  -PG     Bathing (including washing, rinsing, and drying) 3  -PG     Toileting (which includes using toilet bed pan or urinal) 1  -PG     Putting on and taking off regular upper body clothing 3  -PG     Taking care of personal grooming  (such as brushing teeth) 3  -PG     Eating meals 4  -PG     AM-PAC 6 Clicks Score (OT) 15  -PG       Row Name 12/11/23 1050          Functional Assessment    Outcome Measure Options AM-PAC 6 Clicks Daily Activity (OT);Optimal Instrument  -PG       Row Name 12/11/23 1050          Optimal Instrument    Optimal Instrument Optimal - 3  -PG     Bending/Stooping 4  -PG     Standing 4  -PG     Reaching 1  -PG               User Key  (r) = Recorded By, (t) = Taken By, (c) = Cosigned By      Initials Name Provider Type    PG Kodak Matos OT Occupational Therapist                    Occupational Therapy Education       Title: PT OT SLP Therapies (Done)       Topic: Occupational Therapy (Done)       Point: ADL training (Done)       Description:   Instruct learner(s) on proper safety adaptation and remediation techniques during self care or transfers.   Instruct in proper use of assistive devices.                  Learning Progress Summary             Patient Acceptance, E,TB, VU by RM at 11/30/2023 0420    Acceptance, E,D, DU by PG at 11/7/2023 0932                         Point: Home exercise program (Done)       Description:   Instruct learner(s) on appropriate technique for monitoring, assisting and/or progressing therapeutic exercises/activities.                  Learning Progress Summary             Patient Acceptance, E,TB, VU by RM at 11/30/2023 0420    Acceptance, E,D, DU by PG at 11/7/2023 0932                         Point: Precautions (Done)       Description:   Instruct learner(s) on prescribed precautions during self-care and functional transfers.                  Learning Progress Summary             Patient Acceptance, E,TB, VU by RM at 11/30/2023 0420    Acceptance, E,D, DU by PG at 11/7/2023 0932                         Point: Body mechanics (Done)       Description:   Instruct learner(s) on proper positioning and spine alignment during self-care, functional mobility activities and/or exercises.                   Learning Progress Summary             Patient Acceptance, E,TB, VU by  at 11/30/2023 0420    Acceptance, E,D, DU by PG at 11/7/2023 0932                                         User Key       Initials Effective Dates Name Provider Type Discipline     06/08/21 -  Bharath Berg, RN Registered Nurse Nurse    PG 06/16/21 -  Kodak Matos OT Occupational Therapist OT                  OT Recommendation and Plan  Planned Therapy Interventions (OT): activity tolerance training, BADL retraining, strengthening exercise, transfer/mobility retraining, patient/caregiver education/training, occupation/activity based interventions  Therapy Frequency (OT): 5 times/wk  Plan of Care Review  Plan of Care Reviewed With: patient  Progress: no change  Outcome Evaluation: Patient participated in upper body strengthening exercises utilizing 6 pound weights, 2 sets x 15 repetitions.  Patient stood mod assist x 2 and able to advance feet 3 inches with assist to advance right foot forward and 2 others to push bed.  Continue OT services per plan of care     Time Calculation:   Evaluation Complexity (OT)  Review Occupational Profile/Medical/Therapy History Complexity: brief/low complexity  Assessment, Occupational Performance/Identification of Deficit Complexity: 3-5 performance deficits  Clinical Decision Making Complexity (OT): problem focused assessment/low complexity  Overall Complexity of Evaluation (OT): low complexity     Time Calculation- OT       Row Name 12/11/23 1051             Time Calculation- OT    OT Received On 12/11/23  -PG      OT Goal Re-Cert Due Date 12/27/23  -PG         Timed Charges    49767 - OT Therapeutic Exercise Minutes 15  -PG      24318 - OT Therapeutic Activity Minutes 20  -PG         SNF Occupational Therapy Minutes    Skilled Minutes- OT 35 min  -PG         Total Minutes    Timed Charges Total Minutes 35  -PG       Total Minutes 35  -PG                User Key  (r) = Recorded By, (t) = Taken By, (c)  = Cosigned By      Initials Name Provider Type    PG Kodak Matos OT Occupational Therapist                  Therapy Charges for Today       Code Description Service Date Service Provider Modifiers Qty    36997044005 HC OT THER PROC EA 15 MIN 12/11/2023 Kodak Matos OT GO 1    [id number]  OT THERAPEUTIC ACT EA 15 MIN 12/11/2023 Kodak Matos OT GO 1                 Kodak Matos OT  12/11/2023

## 2023-12-11 NOTE — PLAN OF CARE
Goal Outcome Evaluation:  Plan of Care Reviewed With: patient        Progress: improving  Outcome Evaluation: Resident alert, oriented, able to make needs known to staff. remains bedbound for floor staff. therapy reports resident stood at bedside and took 2 steps today. tolerated well. Medicated for prn pain x1 this shift. Medicated with prn Baclofen x1 per request this shift, effective. Had visitor this afternoon. Resident hopeful for tomorrow, believes he can walk more than 2 steps tomorrow. Blood glucose stable for all 3 meals, no sliding scale required. resident pleasant and cooperative.

## 2023-12-11 NOTE — THERAPY TREATMENT NOTE
SNF - Physical Therapy Treatment Note  KEO Dominguez     Patient Name: Jose Shaikh  : 1960  MRN: [id number]  Today's Date: 2023      Visit Dx:    ICD-10-CM ICD-9-CM   1. Difficulty in walking  R26.2 719.7     Patient Active Problem List   Diagnosis    Diabetic ulcer of left heel associated with type 2 DM    Acute osteomyelitis of left calcaneus     Diabetic ulcer of left heel associated with type 2 DM    Diabetic ulcer of right midfoot associated with type 2 DM    Paroxysmal atrial fibrillation    Essential hypertension    Hyperlipidemia LDL goal <100    Cellulitis and abscess of foot    High alkaline phosphatase level    Osteomyelitis    Onychomycosis    Onychocryptosis    Foot pain, bilateral    Osteomyelitis of foot, right, acute    Cellulitis of right foot    Type 2 diabetes mellitus, with long-term current use of insulin    Class 3 severe obesity due to excess calories with serious comorbidity and body mass index (BMI) of 45.0 to 49.9 in adult    Anxiety disorder, unspecified    Claustrophobia    Dependence on wheelchair    Depression, unspecified    Long term (current) use of anticoagulants    Long term (current) use of oral hypoglycemic drugs    Wound of foot    Non-prs chronic ulcer oth prt r foot limited to brkdwn skin    Orthostatic hypotension    Other chronic osteomyelitis, right ankle and foot    Personal history of nicotine dependence    Thrombocytopenia, unspecified    Unspecified open wound, right foot, initial encounter    Diabetic foot infection    Subacute osteomyelitis of right foot    Right foot pain    Sepsis    Onychomycosis    Foot pain, left    Impaired mobility and ADLs    Absence of toe of right foot    Corns and callosity    Disability of walking    Fracture    Limb swelling    Polyneuropathy    Pressure ulcer, stage 1    Shortness of breath    Generalized weakness    Debility     Past Medical History:   Diagnosis Date    Absence of toe of right foot     Acute osteomyelitis  of left calcaneus  08/18/2021    Anxiety and depression     Arthritis     Cancer     Claustrophobia     Corns and callus     Diabetic ulcer of left heel associated with type 2 DM 08/18/2021    Diabetic ulcer of left heel associated with type 2 DM 07/06/2021    Diabetic ulcer of right midfoot associated with type 2 DM 08/18/2021    Difficulty walking     Essential hypertension 08/31/2021    Hammertoe     Hyperlipidemia LDL goal <100 08/31/2021    Ingrown toenail     Obesity     Paroxysmal atrial fibrillation 08/31/2021    Polyneuropathy     Pressure ulcer, stage 1     Tinea unguium     Type 2 diabetes mellitus with polyneuropathy      Past Surgical History:   Procedure Laterality Date    CYST REMOVAL      center of back; benign    EYE SURGERY      INCISION AND DRAINAGE ABSCESS      back    INCISION AND DRAINAGE LEG Right 12/10/2021    Procedure: INCISION AND DRAINAGE LOWER EXTREMITY;  Surgeon: Ash Leyva DPM;  Location: [facility];  Service: Podiatry;  Laterality: Right;    OTHER SURGICAL HISTORY      Surgical clips left foot    TOE SURGERY Right     Removal of 5th toe    TRANS METATARSAL AMPUTATION Right 12/02/2021    Procedure: AMPUTATION TRANS METATARSAL;  Surgeon: Ash Leyva DPM;  Location: [facility];  Service: Podiatry;  Laterality: Right;    VASCULAR SURGERY      WRIST SURGERY Left     repair of injury       PT Assessment (last 12 hours)       PT Evaluation and Treatment       Row Name 12/11/23 1202          Physical Therapy Time and Intention    Subjective Information complains of;pain  -WM     Document Type therapy note (daily note)  -WM     Mode of Treatment individual therapy;physical therapy  -WM     Patient Effort adequate  -WM     Symptoms Noted During/After Treatment fatigue;increased pain  -WM       Row Name 12/11/23 1202          Pain    Pretreatment Pain Rating 2/10  -WM     Posttreatment Pain Rating 7/10  -WM     Pain Location - Side/Orientation Left  -WM     Pain  Location - hip;knee  -     Pain Intervention(s) Emotional support;Rest  -       Row Name 12/11/23 1202          Bed Mobility    Supine-Sit-Supine [place] (Bed Mobility) moderate assist (50% patient effort);1 person assist;1 person to manage equipment  -     Assistive Device (Bed Mobility) bed rails;draw sheet;overhead trapeze  -       Row Name 12/11/23 1202          Sit-Stand Transfer    Sit-Stand [place] (Transfers) moderate assist (50% patient effort);2 person assist  -     Assistive Device (Sit-Stand Transfers) bariatric;walker, front-wheeled  -WM       Row Name 12/11/23 1202          Stand-Sit Transfer    Stand-Sit [place] (Transfers) moderate assist (50% patient effort);2 person assist  -     Assistive Device (Stand-Sit Transfers) bariatric;walker, front-wheeled  -WM       Row Name 12/11/23 1202          Gait/Stairs (Locomotion)    [place] Level (Gait) moderate assist (50% patient effort);2 person assist;verbal cues  -     Assistive Device (Gait) bariatric equipment;walker, front-wheeled  -     Pattern (Gait) 4-point;step-to  -WM     Deviations/Abnormal Patterns (Gait) stride length decreased;gait speed decreased  -     Comment, (Gait/Stairs) Pt was able to ambulate 1 small step  -       Row Name 12/11/23 1202          Safety Issues, Functional Mobility    Impairments Affecting Function (Mobility) balance;endurance/activity tolerance;pain;range of motion (ROM);strength  -       Row Name 12/11/23 1202          Hip (Therapeutic Exercise)    Hip AROM (Therapeutic Exercise) right;aBduction;aDduction;supine;20 repititions  -     Hip AAROM (Therapeutic Exercise) left;aBduction;aDduction;supine;10 repetitions;2 sets  -     Hip Isometrics (Therapeutic Exercise) bilateral;gluteal sets;supine;10 repetitions;3 second hold;2 sets  -       Row Name 12/11/23 1202          Knee (Therapeutic Exercise)    Knee Isometrics (Therapeutic Exercise) bilateral;quad sets;supine;10  repetitions;3 second hold;2 sets  -     Knee Strengthening (Therapeutic Exercise) bilateral;SAQ (short arc quad);supine;4 lb free weight;20 repititions  -WM       Row Name 12/11/23 1202          Ankle (Therapeutic Exercise)    Ankle AROM (Therapeutic Exercise) bilateral;dorsiflexion;plantarflexion;supine;20 repititions  -WM       Row Name             Wound 11/07/23 1240 Right anterior foot    Wound - Properties Group Placement Date: 11/07/23  - Placement Time: 1240  -FH Side: Right  -FH Orientation: anterior  -FH Location: foot  -FH    Retired Wound - Properties Group Placement Date: 11/07/23  - Placement Time: 1240  -FH Side: Right  -FH Orientation: anterior  -FH Location: foot  -FH    Retired Wound - Properties Group Date first assessed: 11/07/23  - Time first assessed: 1240  -FH Side: Right  -FH Location: foot  -FH      Row Name             Wound 11/07/23 1238 Left lower arm    Wound - Properties Group Placement Date: 11/07/23  - Placement Time: 1238  -FH Side: Left  -FH Orientation: lower  -FH Location: arm  -FH    Retired Wound - Properties Group Placement Date: 11/07/23  - Placement Time: 1238  -FH Side: Left  -FH Orientation: lower  -FH Location: arm  -FH    Retired Wound - Properties Group Date first assessed: 11/07/23  - Time first assessed: 1238  -FH Side: Left  -FH Location: arm  -FH      Row Name 12/11/23 1202          Positioning and Restraints    Pre-Treatment Position in bed  -WM     Post Treatment Position bed  -WM     In Bed fowlers;call light within reach;encouraged to call for assist  -       Row Name 12/11/23 1202          Progress Summary (PT)    Progress Toward Functional Goals (PT) progress toward functional goals is fair  -WM               User Key  (r) = Recorded By, (t) = Taken By, (c) = Cosigned By      Initials Name Provider Type    FH Carlos Cagle RN Registered Nurse    Carson Johnson PTA Physical Therapist Assistant                  Section G             "  Section GG                       Physical Therapy Education       Title: PT OT SLP Therapies (Done)       Topic: Physical Therapy (Done)       Point: Mobility training (Done)       Learning Progress Summary             Patient Acceptance, E,TB, VU by  at 11/30/2023 0420    Acceptance, E,TB, VU by MOE at 11/8/2023 1341                         Point: Precautions (Done)       Learning Progress Summary             Patient Acceptance, E,TB, VU by  at 11/30/2023 0420    Acceptance, E,TB, VU by MOE at 11/8/2023 1341                                         User Key       Initials Effective Dates Name Provider Type Discipline     06/08/21 -  Bharath Berg, RN Registered Nurse Nurse    MOE 06/03/21 -  Merlin Rao PT Physical Therapist PT                  PT Recommendation and Plan     Progress Summary (PT)  Progress Toward Functional Goals (PT): progress toward functional goals is fair  Daily Progress Summary (PT): Pt participated in BLE exercises. He declined all transfers. He continues to have \"popping\" from his left hip and knee with ROM.   Outcome Measures       Row Name 12/11/23 1207 12/09/23 0650 12/08/23 1402       How much help from another person do you currently need...    Turning from your back to your side while in flat bed without using bedrails? 3  -WM 3  -WM 3  -WM    Moving from lying on back to sitting on the side of a flat bed without bedrails? 3  -WM 3  -WM 3  -WM    Moving to and from a bed to a chair (including a wheelchair)? 1  -WM 1  -WM 1  -WM    Standing up from a chair using your arms (e.g., wheelchair, bedside chair)? 2  -WM 2  -WM 2  -WM    Climbing 3-5 steps with a railing? 1  -WM 1  -WM 1  -WM    To walk in hospital room? 1  -WM 1  -WM 1  -WM    AM-PAC 6 Clicks Score (PT) 11  -WM 11  -WM 11  -WM    Highest Level of Mobility Goal 4 --> Transfer to chair/commode  -WM 4 --> Transfer to chair/commode  -WM 4 --> Transfer to chair/commode  -WM              User Key  (r) = Recorded By, (t) = " Taken By, (c) = Cosigned By      Initials Name Provider Type    Carson Johnson PTA Physical Therapist Assistant                     Time Calculation:    PT Charges       Row Name 12/11/23 1200             Time Calculation    PT Received On 12/11/23  -WM         Timed Charges    30198 - PT Therapeutic Exercise Minutes 15  -WM      18169 - Gait Training Minutes  4  -WM      47832 - PT Therapeutic Activity Minutes 20  -WM         SNF Physical Therapy Minutes    Skilled Minutes- PT 39 min  -WM         Total Minutes    Timed Charges Total Minutes 39  -WM       Total Minutes 39  -WM                User Key  (r) = Recorded By, (t) = Taken By, (c) = Cosigned By      Initials Name Provider Type    Carson Johnson PTA Physical Therapist Assistant                      PT G-Codes  Outcome Measure Options: AM-PAC 6 Clicks Daily Activity (OT), Optimal Instrument  AM-PAC 6 Clicks Score (PT): 11  AM-PAC 6 Clicks Score (OT): 15    Carson Inman PTA  12/11/2023

## 2023-12-12 LAB
GLUCOSE BLDC GLUCOMTR-MCNC: 128 MG/DL (ref 70–99)
GLUCOSE BLDC GLUCOMTR-MCNC: 138 MG/DL (ref 70–99)
GLUCOSE BLDC GLUCOMTR-MCNC: 146 MG/DL (ref 70–99)
GLUCOSE BLDC GLUCOMTR-MCNC: 79 MG/DL (ref 70–99)
SARS-COV-2 RNA RESP QL NAA+PROBE: NOT DETECTED

## 2023-12-12 PROCEDURE — 87635 SARS-COV-2 COVID-19 AMP PRB: CPT | Performed by: PHYSICIAN ASSISTANT

## 2023-12-12 PROCEDURE — 97110 THERAPEUTIC EXERCISES: CPT

## 2023-12-12 PROCEDURE — 63710000001 INSULIN DETEMIR PER 5 UNITS: Performed by: PHYSICIAN ASSISTANT

## 2023-12-12 PROCEDURE — 97530 THERAPEUTIC ACTIVITIES: CPT

## 2023-12-12 PROCEDURE — 82948 REAGENT STRIP/BLOOD GLUCOSE: CPT

## 2023-12-12 PROCEDURE — 97116 GAIT TRAINING THERAPY: CPT

## 2023-12-12 RX ADMIN — PREGABALIN 100 MG: 100 CAPSULE ORAL at 16:13

## 2023-12-12 RX ADMIN — INSULIN DETEMIR 70 UNITS: 100 INJECTION, SOLUTION SUBCUTANEOUS at 08:12

## 2023-12-12 RX ADMIN — LISINOPRIL 2.5 MG: 2.5 TABLET ORAL at 08:12

## 2023-12-12 RX ADMIN — CYANOCOBALAMIN TAB 500 MCG 1000 MCG: 500 TAB at 08:12

## 2023-12-12 RX ADMIN — OXYCODONE AND ACETAMINOPHEN 1 TABLET: 7.5; 325 TABLET ORAL at 18:29

## 2023-12-12 RX ADMIN — PREGABALIN 100 MG: 100 CAPSULE ORAL at 20:57

## 2023-12-12 RX ADMIN — EMPAGLIFLOZIN 10 MG: 10 TABLET, FILM COATED ORAL at 08:12

## 2023-12-12 RX ADMIN — PREGABALIN 100 MG: 100 CAPSULE ORAL at 08:12

## 2023-12-12 RX ADMIN — Medication 250 MG: at 08:12

## 2023-12-12 RX ADMIN — APIXABAN 5 MG: 5 TABLET, FILM COATED ORAL at 20:57

## 2023-12-12 RX ADMIN — Medication 10 MG: at 20:57

## 2023-12-12 RX ADMIN — FERROUS SULFATE TAB 325 MG (65 MG ELEMENTAL FE) 325 MG: 325 (65 FE) TAB at 08:12

## 2023-12-12 RX ADMIN — ATORVASTATIN CALCIUM 10 MG: 10 TABLET, FILM COATED ORAL at 20:57

## 2023-12-12 RX ADMIN — HYDROCORTISONE 1 APPLICATION: 1 OINTMENT TOPICAL at 08:11

## 2023-12-12 RX ADMIN — Medication 250 MG: at 20:56

## 2023-12-12 RX ADMIN — SERTRALINE HYDROCHLORIDE 25 MG: 25 TABLET ORAL at 20:57

## 2023-12-12 RX ADMIN — HYDROCORTISONE 1 APPLICATION: 1 OINTMENT TOPICAL at 16:14

## 2023-12-12 RX ADMIN — OXYCODONE AND ACETAMINOPHEN 1 TABLET: 7.5; 325 TABLET ORAL at 02:54

## 2023-12-12 RX ADMIN — HYDROCORTISONE 1 APPLICATION: 1 OINTMENT TOPICAL at 20:57

## 2023-12-12 RX ADMIN — BACLOFEN 10 MG: 10 TABLET ORAL at 13:01

## 2023-12-12 RX ADMIN — INSULIN DETEMIR 60 UNITS: 100 INJECTION, SOLUTION SUBCUTANEOUS at 20:57

## 2023-12-12 RX ADMIN — APIXABAN 5 MG: 5 TABLET, FILM COATED ORAL at 08:12

## 2023-12-12 RX ADMIN — OXYCODONE AND ACETAMINOPHEN 1 TABLET: 7.5; 325 TABLET ORAL at 10:14

## 2023-12-12 NOTE — PLAN OF CARE
Goal Outcome Evaluation:  Plan of Care Reviewed With: patient        Progress: improving  Outcome Evaluation: Resident alert, oriented, able to make needs known to staff. Walked 3 steps with therpy this morning. Still bedbound for staff. Medicated for prn pain x1 today, effective, medicated with prn baclofen per request x1, effective. Visitor at BS this afternoon. resident conversant with visitor and staff. resident pleasant and cooperative.  Woundcare here for tx today. S/T to lt forearm now healed, open to air. Continue current plan of care.

## 2023-12-12 NOTE — THERAPY TREATMENT NOTE
Acute Care - Physical Therapy Treatment Note  KEO Dominguez     Patient Name: Jose Shaikh  : 1960  MRN: [id number]  Today's Date: 2023     Admit date: 2023     Referring Physician: Cailin Acosta MD     Surgery Date:* No surgery found *           Visit Dx:     ICD-10-CM ICD-9-CM   1. Difficulty in walking  R26.2 719.7     Patient Active Problem List   Diagnosis    Diabetic ulcer of left heel associated with type 2 DM    Acute osteomyelitis of left calcaneus     Diabetic ulcer of left heel associated with type 2 DM    Diabetic ulcer of right midfoot associated with type 2 DM    Paroxysmal atrial fibrillation    Essential hypertension    Hyperlipidemia LDL goal <100    Cellulitis and abscess of foot    High alkaline phosphatase level    Osteomyelitis    Onychomycosis    Onychocryptosis    Foot pain, bilateral    Osteomyelitis of foot, right, acute    Cellulitis of right foot    Type 2 diabetes mellitus, with long-term current use of insulin    Class 3 severe obesity due to excess calories with serious comorbidity and body mass index (BMI) of 45.0 to 49.9 in adult    Anxiety disorder, unspecified    Claustrophobia    Dependence on wheelchair    Depression, unspecified    Long term (current) use of anticoagulants    Long term (current) use of oral hypoglycemic drugs    Wound of foot    Non-prs chronic ulcer oth prt r foot limited to brkdwn skin    Orthostatic hypotension    Other chronic osteomyelitis, right ankle and foot    Personal history of nicotine dependence    Thrombocytopenia, unspecified    Unspecified open wound, right foot, initial encounter    Diabetic foot infection    Subacute osteomyelitis of right foot    Right foot pain    Sepsis    Onychomycosis    Foot pain, left    Impaired mobility and ADLs    Absence of toe of right foot    Corns and callosity    Disability of walking    Fracture    Limb swelling    Polyneuropathy    Pressure ulcer, stage 1    Shortness of breath     Generalized weakness    Debility     Past Medical History:   Diagnosis Date    Absence of toe of right foot     Acute osteomyelitis of left calcaneus  08/18/2021    Anxiety and depression     Arthritis     Cancer     Claustrophobia     Corns and callus     Diabetic ulcer of left heel associated with type 2 DM 08/18/2021    Diabetic ulcer of left heel associated with type 2 DM 07/06/2021    Diabetic ulcer of right midfoot associated with type 2 DM 08/18/2021    Difficulty walking     Essential hypertension 08/31/2021    Hammertoe     Hyperlipidemia LDL goal <100 08/31/2021    Ingrown toenail     Obesity     Paroxysmal atrial fibrillation 08/31/2021    Polyneuropathy     Pressure ulcer, stage 1     Tinea unguium     Type 2 diabetes mellitus with polyneuropathy      Past Surgical History:   Procedure Laterality Date    CYST REMOVAL      center of back; benign    EYE SURGERY      INCISION AND DRAINAGE ABSCESS      back    INCISION AND DRAINAGE LEG Right 12/10/2021    Procedure: INCISION AND DRAINAGE LOWER EXTREMITY;  Surgeon: Ash Leyva DPM;  Location: [facility] OR;  Service: Podiatry;  Laterality: Right;    OTHER SURGICAL HISTORY      Surgical clips left foot    TOE SURGERY Right     Removal of 5th toe    TRANS METATARSAL AMPUTATION Right 12/02/2021    Procedure: AMPUTATION TRANS METATARSAL;  Surgeon: Ash Leyva DPM;  Location: [facility] OR;  Service: Podiatry;  Laterality: Right;    VASCULAR SURGERY      WRIST SURGERY Left     repair of injury     PT Assessment (last 12 hours)       PT Evaluation and Treatment       Row Name 12/12/23 1100          Physical Therapy Time and Intention    Subjective Information complains of;weakness  -MOE     Document Type therapy note (daily note)  -OME     Mode of Treatment individual therapy;physical therapy  -MOE     Patient Effort adequate  -MOE       Row Name 12/12/23 1100          Bed Mobility    All Activities, [place] (Bed Mobility) standby assist   -MOE     Supine-Sit [place] (Bed Mobility) contact guard  -MOE       Row Name 12/12/23 1100          Sit-Stand Transfer    Sit-Stand [place] (Transfers) minimum assist (75% patient effort)  -MOE     Assistive Device (Sit-Stand Transfers) walker, front-wheeled  -MOE       Row Name 12/12/23 1100          Gait/Stairs (Locomotion)    Gait/Stairs Locomotion gait/ambulation assistive device  -MOE     [place] Level (Gait) moderate assist (50% patient effort)  -MOE     Assistive Device (Gait) bariatric equipment;walker, front-wheeled  -MOE     Distance in Feet (Gait) --  1 foot 2x with a short sitting rest inbetween.  PT required significant cueing to participate with the session today  -MOE       Row Name 12/12/23 1100          Safety Issues, Functional Mobility    Impairments Affecting Function (Mobility) balance;endurance/activity tolerance;pain;range of motion (ROM);strength  -MOE       Row Name             Wound 11/07/23 1240 Right anterior foot    Wound - Properties Group Placement Date: 11/07/23  - Placement Time: 1240  -FH Side: Right  -FH Orientation: anterior  -FH Location: foot  -FH    Retired Wound - Properties Group Placement Date: 11/07/23  - Placement Time: 1240  -FH Side: Right  -FH Orientation: anterior  -FH Location: foot  -FH    Retired Wound - Properties Group Date first assessed: 11/07/23  - Time first assessed: 1240  -FH Side: Right  -FH Location: foot  -FH      Row Name             [REMOVED] Wound 11/07/23 1238 Left lower arm    Wound - Properties Group Placement Date: 11/07/23  - Placement Time: 1238  -FH Side: Left  -FH Orientation: lower  -FH Location: arm  -FH Removal Date: 12/12/23  -[facility] Wound Outcome: Healed  -NH    Retired Wound - Properties Group Placement Date: 11/07/23  - Placement Time: 1238  -FH Side: Left  -FH Orientation: lower  -FH Location: arm  -FH Removal Date: 12/12/23  -NH Wound Outcome: Healed  -NH    Retired Wound - Properties Group Date first assessed: 11/07/23   - Time first assessed: 1238  - Side: Left  - Location: arm  -FH Resolution Date: 12/12/23  -NH Wound Outcome: Healed  -NH      Row Name 12/12/23 1100          Progress Summary (PT)    Progress Toward Functional Goals (PT) progress toward functional goals is gradual  -MOE               User Key  (r) = Recorded By, (t) = Taken By, (c) = Cosigned By      Initials Name Provider Type    [facility] Dottie Guevara, RN Registered Nurse     Carlos Cagle RN Registered Nurse    Merlin De La O, PT Physical Therapist                    Physical Therapy Education       Title: PT OT SLP Therapies (Done)       Topic: Physical Therapy (Done)       Point: Mobility training (Done)       Learning Progress Summary             Patient Acceptance, E,TB, VU by  at 11/30/2023 0420    Acceptance, E,TB, VU by MOE at 11/8/2023 1341                         Point: Precautions (Done)       Learning Progress Summary             Patient Acceptance, E,TB, VU by  at 11/30/2023 0420    Acceptance, E,TB, VU by MOE at 11/8/2023 1341                                         User Key       Initials Effective Dates Name Provider Type [facility] 06/08/21 -  Bharath Berg RN Registered Nurse Nurse    MOE 06/03/21 -  Merlin Rao, PT Physical Therapist PT                  PT Recommendation and Plan  Anticipated Discharge Disposition (PT): home with home health  Planned Therapy Interventions (PT): bed mobility training, balance training, gait training, joint mobilization, home exercise program, stair training, strengthening, transfer training  Therapy Frequency (PT): 6 times/wk  Progress Summary (PT)  Progress Toward Functional Goals (PT): progress toward functional goals is gradual  Daily Progress Summary (PT): Pt has made little to no progress toward his goals.  He is still very weak and will require skilled PT services to address his mobility issues but he will need to show more effort and progress druing this reevaluation period  to continue to be appropriate.  Will continue current plan another 30 day period.  Pt was instructed that if no progress is made that we will have no choice but to d/c services due to lack of progress.  Plan of Care Reviewed With: patient  Outcome Evaluation: Patient presents with decreased strength, transfers and ambulation.  Skilled physical therapy services will be required to address these mobility deficits.   Outcome Measures       Row Name 12/11/23 1207             How much help from another person do you currently need...    Turning from your back to your side while in flat bed without using bedrails? 3  -WM      Moving from lying on back to sitting on the side of a flat bed without bedrails? 3  -WM      Moving to and from a bed to a chair (including a wheelchair)? 1  -WM      Standing up from a chair using your arms (e.g., wheelchair, bedside chair)? 2  -WM      Climbing 3-5 steps with a railing? 1  -WM      To walk in hospital room? 1  -WM      AM-PAC 6 Clicks Score (PT) 11  -WM      Highest Level of Mobility Goal 4 --> Transfer to chair/commode  -WM                User Key  (r) = Recorded By, (t) = Taken By, (c) = Cosigned By      Initials Name Provider Type    WM Carson Inman PTA Physical Therapist Assistant                     Time Calculation:    PT Charges       Row Name 12/12/23 1127             Time Calculation    PT Received On 12/12/23  -MOE         Timed Charges    40435 - Gait Training Minutes  10  -MOE      85794 - PT Therapeutic Activity Minutes 5  -MOE         Total Minutes    Timed Charges Total Minutes 15  -MOE       Total Minutes 15  -MOE                User Key  (r) = Recorded By, (t) = Taken By, (c) = Cosigned By      Initials Name Provider Type    Merlin De La O, PT Physical Therapist                      PT G-Codes  Outcome Measure Options: AM-PAC 6 Clicks Daily Activity (OT), Optimal Instrument  AM-PAC 6 Clicks Score (PT): 10  AM-PAC 6 Clicks Score (OT): 15    Merlin Rao  PT  12/12/2023

## 2023-12-12 NOTE — THERAPY TREATMENT NOTE
Patient Name: Jose Shaikh  : 1960    MRN: [id number]                              Today's Date: 2023       Admit Date: 2023    Visit Dx:     ICD-10-CM ICD-9-CM   1. Difficulty in walking  R26.2 719.7     Patient Active Problem List   Diagnosis    Diabetic ulcer of left heel associated with type 2 DM    Acute osteomyelitis of left calcaneus     Diabetic ulcer of left heel associated with type 2 DM    Diabetic ulcer of right midfoot associated with type 2 DM    Paroxysmal atrial fibrillation    Essential hypertension    Hyperlipidemia LDL goal <100    Cellulitis and abscess of foot    High alkaline phosphatase level    Osteomyelitis    Onychomycosis    Onychocryptosis    Foot pain, bilateral    Osteomyelitis of foot, right, acute    Cellulitis of right foot    Type 2 diabetes mellitus, with long-term current use of insulin    Class 3 severe obesity due to excess calories with serious comorbidity and body mass index (BMI) of 45.0 to 49.9 in adult    Anxiety disorder, unspecified    Claustrophobia    Dependence on wheelchair    Depression, unspecified    Long term (current) use of anticoagulants    Long term (current) use of oral hypoglycemic drugs    Wound of foot    Non-prs chronic ulcer oth prt r foot limited to brkdwn skin    Orthostatic hypotension    Other chronic osteomyelitis, right ankle and foot    Personal history of nicotine dependence    Thrombocytopenia, unspecified    Unspecified open wound, right foot, initial encounter    Diabetic foot infection    Subacute osteomyelitis of right foot    Right foot pain    Sepsis    Onychomycosis    Foot pain, left    Impaired mobility and ADLs    Absence of toe of right foot    Corns and callosity    Disability of walking    Fracture    Limb swelling    Polyneuropathy    Pressure ulcer, stage 1    Shortness of breath    Generalized weakness    Debility     Past Medical History:   Diagnosis Date    Absence of toe of right foot     Acute  osteomyelitis of left calcaneus  08/18/2021    Anxiety and depression     Arthritis     Cancer     Claustrophobia     Corns and callus     Diabetic ulcer of left heel associated with type 2 DM 08/18/2021    Diabetic ulcer of left heel associated with type 2 DM 07/06/2021    Diabetic ulcer of right midfoot associated with type 2 DM 08/18/2021    Difficulty walking     Essential hypertension 08/31/2021    Hammertoe     Hyperlipidemia LDL goal <100 08/31/2021    Ingrown toenail     Obesity     Paroxysmal atrial fibrillation 08/31/2021    Polyneuropathy     Pressure ulcer, stage 1     Tinea unguium     Type 2 diabetes mellitus with polyneuropathy      Past Surgical History:   Procedure Laterality Date    CYST REMOVAL      center of back; benign    EYE SURGERY      INCISION AND DRAINAGE ABSCESS      back    INCISION AND DRAINAGE LEG Right 12/10/2021    Procedure: INCISION AND DRAINAGE LOWER EXTREMITY;  Surgeon: Ash Leyva DPM;  Location: [facility];  Service: Podiatry;  Laterality: Right;    OTHER SURGICAL HISTORY      Surgical clips left foot    TOE SURGERY Right     Removal of 5th toe    TRANS METATARSAL AMPUTATION Right 12/02/2021    Procedure: AMPUTATION TRANS METATARSAL;  Surgeon: Ash Leyva DPM;  Location: [facility] OR;  Service: Podiatry;  Laterality: Right;    VASCULAR SURGERY      WRIST SURGERY Left     repair of injury      General Information       Row Name 12/12/23 1048          OT Time and Intention    Document Type therapy note (daily note)  -PG     Mode of Treatment individual therapy;occupational therapy  -PG               User Key  (r) = Recorded By, (t) = Taken By, (c) = Cosigned By      Initials Name Provider Type    PG Kodak Matos OT Occupational Therapist                     Mobility/ADL's       Row Name 12/12/23 1048          Transfers    Transfers sit-stand transfer;stand-sit transfer  -PG       Row Name 12/12/23 1048          Sit-Stand Transfer    Sit-Stand  [place] (Transfers) moderate assist (50% patient effort);2 person assist  -PG     Assistive Device (Sit-Stand Transfers) bariatric;walker, front-wheeled  -PG       Row Name 12/12/23 1048          Stand-Sit Transfer    Stand-Sit [place] (Transfers) moderate assist (50% patient effort);2 person assist  -PG     Assistive Device (Stand-Sit Transfers) bariatric;walker, front-wheeled  -PG               User Key  (r) = Recorded By, (t) = Taken By, (c) = Cosigned By      Initials Name Provider Type    PG Kodak Matos OT Occupational Therapist                   Obj/Interventions       Row Name 12/12/23 1049          Shoulder (Therapeutic Exercise)    Shoulder (Therapeutic Exercise) strengthening exercise  -PG     Shoulder Strengthening (Therapeutic Exercise) 15 repititions;5 lb free weight  -PG       Row Name 12/12/23 1049          Elbow/Forearm (Therapeutic Exercise)    Elbow/Forearm (Therapeutic Exercise) strengthening exercise  -PG     Elbow/Forearm Strengthening (Therapeutic Exercise) 5 lb free weight;15 repititions  -PG       Row Name 12/12/23 1049          Motor Skills    Therapeutic Exercise shoulder;elbow/forearm  -PG               User Key  (r) = Recorded By, (t) = Taken By, (c) = Cosigned By      Initials Name Provider Type    PG Kodak Matos OT Occupational Therapist                   Goals/Plan    No documentation.                  Clinical Impression       Row Name 12/12/23 1049          Plan of Care Review    Plan of Care Reviewed With patient  -PG     Progress no change  -PG               User Key  (r) = Recorded By, (t) = Taken By, (c) = Cosigned By      Initials Name Provider Type    PG Kodak Matos OT Occupational Therapist                   Outcome Measures       Row Name 12/12/23 1050          How much help from another is currently needed...    Putting on and taking off regular lower body clothing? 1  -PG     Bathing (including washing, rinsing, and drying) 3  -PG     Toileting (which  includes using toilet bed pan or urinal) 1  -PG     Putting on and taking off regular upper body clothing 3  -PG     Taking care of personal grooming (such as brushing teeth) 3  -PG     Eating meals 4  -PG     AM-PAC 6 Clicks Score (OT) 15  -PG       Row Name 12/12/23 1050          Functional Assessment    Outcome Measure Options AM-PAC 6 Clicks Daily Activity (OT);Optimal Instrument  -PG       Row Name 12/12/23 1050          Optimal Instrument    Optimal Instrument Optimal - 3  -PG     Bending/Stooping 4  -PG     Standing 4  -PG     Reaching 1  -PG               User Key  (r) = Recorded By, (t) = Taken By, (c) = Cosigned By      Initials Name Provider Type    PG Kodak Matos OT Occupational Therapist                    Occupational Therapy Education       Title: PT OT SLP Therapies (Done)       Topic: Occupational Therapy (Done)       Point: ADL training (Done)       Description:   Instruct learner(s) on proper safety adaptation and remediation techniques during self care or transfers.   Instruct in proper use of assistive devices.                  Learning Progress Summary             Patient Acceptance, E,TB, VU by  at 11/30/2023 0420    Acceptance, E,D, DU by PG at 11/7/2023 0932                         Point: Home exercise program (Done)       Description:   Instruct learner(s) on appropriate technique for monitoring, assisting and/or progressing therapeutic exercises/activities.                  Learning Progress Summary             Patient Acceptance, E,TB, VU by RM at 11/30/2023 0420    Acceptance, E,D, DU by PG at 11/7/2023 0932                         Point: Precautions (Done)       Description:   Instruct learner(s) on prescribed precautions during self-care and functional transfers.                  Learning Progress Summary             Patient Acceptance, E,TB, VU by  at 11/30/2023 0420    Acceptance, E,D, DU by PG at 11/7/2023 0932                         Point: Body mechanics (Done)        Description:   Instruct learner(s) on proper positioning and spine alignment during self-care, functional mobility activities and/or exercises.                  Learning Progress Summary             Patient Acceptance, E,TB, VU by  at 11/30/2023 0420    Acceptance, E,D, DU by PG at 11/7/2023 0932                                         User Key       Initials Effective Dates Name Provider Type Discipline     06/08/21 -  Bharath Berg, RN Registered Nurse Nurse    PG 06/16/21 -  Kodak Matos OT Occupational Therapist OT                  OT Recommendation and Plan  Planned Therapy Interventions (OT): activity tolerance training, BADL retraining, strengthening exercise, transfer/mobility retraining, patient/caregiver education/training, occupation/activity based interventions  Therapy Frequency (OT): 5 times/wk  Plan of Care Review  Plan of Care Reviewed With: patient  Progress: no change  Outcome Evaluation: Patient participated in upper body strengthening exercises utilizing 6 pound weights, 2 sets x 15 repetitions.  Patient stood mod assist x 2 and able to advance feet 3 inches with assist to advance right foot forward and 2 others to push bed.  Continue OT services per plan of care     Time Calculation:   Evaluation Complexity (OT)  Review Occupational Profile/Medical/Therapy History Complexity: brief/low complexity  Assessment, Occupational Performance/Identification of Deficit Complexity: 3-5 performance deficits  Clinical Decision Making Complexity (OT): problem focused assessment/low complexity  Overall Complexity of Evaluation (OT): low complexity     Time Calculation- OT       Row Name 12/12/23 1051             Time Calculation- OT    OT Received On 12/12/23  -PG      OT Goal Re-Cert Due Date 12/27/23  -PG         Timed Charges    09642 - OT Therapeutic Exercise Minutes 10  -PG      25657 - OT Therapeutic Activity Minutes 25  -PG         SNF Occupational Therapy Minutes    Skilled Minutes- OT 35 min   -PG         Total Minutes    Timed Charges Total Minutes 35  -PG       Total Minutes 35  -PG                User Key  (r) = Recorded By, (t) = Taken By, (c) = Cosigned By      Initials Name Provider Type    PG Kodak Matos OT Occupational Therapist                  Therapy Charges for Today       Code Description Service Date Service Provider Modifiers Qty    26544225233 HC OT THER PROC EA 15 MIN 12/11/2023 Kodak Matos OT GO 1    28799694165 HC OT THERAPEUTIC ACT EA 15 MIN 12/11/2023 Kodak Matos OT GO 1    86433222473 HC OT THER PROC EA 15 MIN 12/12/2023 Kodak Matos OT GO 1    36807058542 HC OT THERAPEUTIC ACT EA 15 MIN 12/12/2023 Kodak Matos OT GO 1                 Kodak Matos OT  12/12/2023

## 2023-12-12 NOTE — PLAN OF CARE
Goal Outcome Evaluation:              Outcome Evaluation: Patient participates in weight shifting at times but refuses most turns. He is able to pull self up in bed when bed placed in trendelenburg. Patient had one BM in bedpan this shift, using urinal per self. VS stable. MEdicated twice this shift with Percocet for hip pain and one with baclefen.

## 2023-12-12 NOTE — SIGNIFICANT NOTE
Wound Eval / Progress Noted     Angelica     Patient Name: Jose Shaikh  : 1960  MRN: [id number]  Today's Date: 2023                 Admit Date: 2023    Visit Dx:    ICD-10-CM ICD-9-CM   1. Difficulty in walking  R26.2 719.7         Debility        Past Medical History:   Diagnosis Date    Absence of toe of right foot     Acute osteomyelitis of left calcaneus  2021    Anxiety and depression     Arthritis     Cancer     Claustrophobia     Corns and callus     Diabetic ulcer of left heel associated with type 2 DM 2021    Diabetic ulcer of left heel associated with type 2 DM 2021    Diabetic ulcer of right midfoot associated with type 2 DM 2021    Difficulty walking     Essential hypertension 2021    Hammertoe     Hyperlipidemia LDL goal <100 2021    Ingrown toenail     Obesity     Paroxysmal atrial fibrillation 2021    Polyneuropathy     Pressure ulcer, stage 1     Tinea unguium     Type 2 diabetes mellitus with polyneuropathy         Past Surgical History:   Procedure Laterality Date    CYST REMOVAL      center of back; benign    EYE SURGERY      INCISION AND DRAINAGE ABSCESS      back    INCISION AND DRAINAGE LEG Right 12/10/2021    Procedure: INCISION AND DRAINAGE LOWER EXTREMITY;  Surgeon: Ash Leyva DPM;  Location: [facility] OR;  Service: Podiatry;  Laterality: Right;    OTHER SURGICAL HISTORY      Surgical clips left foot    TOE SURGERY Right     Removal of 5th toe    TRANS METATARSAL AMPUTATION Right 2021    Procedure: AMPUTATION TRANS METATARSAL;  Surgeon: Ash Leyva DPM;  Location: [facility] OR;  Service: Podiatry;  Laterality: Right;    VASCULAR SURGERY      WRIST SURGERY Left     repair of injury         Physical Assessment:  Wound 23 1240 Right anterior foot (Active)   Wound Image   23 1030   Dressing Appearance dry;intact 23 1030   Closure None 23 1030   Base dry;scab 23 1030    Periwound dry;intact 12/12/23 1030   Periwound Temperature warm 12/12/23 1030   Periwound Skin Turgor soft 12/12/23 1030   Edges rolled/closed 12/12/23 1030   Drainage Amount none 12/12/23 1030   Care, Wound cleansed with;sterile normal saline 12/12/23 1030   Dressing Care dressing applied;petroleum-based;non-adherent;gauze;gauze, dry 12/12/23 1030   Periwound Care absorptive dressing applied 12/12/23 1030     Wound Check / Follow-up: Patient seen today for wound follow-up. Patient remains in skilled nursing facility for rehab. Remains on bariatric bed and mattress. Traumatic injury to right distal foot/residual limb continues to improve. There is no open tissue at this time. All tissue is scabbed or dry and callused. Cleansed with normal saline and gauze. Recommending to continue current wound care to aid in softening of the area. Skin tear to left lower arm is resolved at this time with closed pink tissue.        Impression: Traumatic injury.      Short term goals: Regain skin integrity, skin protection, pressure reduction, moisture prevention, daily dressing changes, skin care.       Dottie Guevara RN    12/12/2023    11:00 EST

## 2023-12-13 LAB
GLUCOSE BLDC GLUCOMTR-MCNC: 159 MG/DL (ref 70–99)
GLUCOSE BLDC GLUCOMTR-MCNC: 164 MG/DL (ref 70–99)
GLUCOSE BLDC GLUCOMTR-MCNC: 167 MG/DL (ref 70–99)
GLUCOSE BLDC GLUCOMTR-MCNC: 189 MG/DL (ref 70–99)
GLUCOSE BLDC GLUCOMTR-MCNC: 224 MG/DL (ref 70–99)

## 2023-12-13 PROCEDURE — 97110 THERAPEUTIC EXERCISES: CPT

## 2023-12-13 PROCEDURE — 82948 REAGENT STRIP/BLOOD GLUCOSE: CPT

## 2023-12-13 PROCEDURE — 97530 THERAPEUTIC ACTIVITIES: CPT

## 2023-12-13 PROCEDURE — 63710000001 INSULIN LISPRO (HUMAN) PER 5 UNITS: Performed by: INTERNAL MEDICINE

## 2023-12-13 PROCEDURE — 63710000001 INSULIN DETEMIR PER 5 UNITS: Performed by: PHYSICIAN ASSISTANT

## 2023-12-13 PROCEDURE — 97116 GAIT TRAINING THERAPY: CPT

## 2023-12-13 RX ADMIN — Medication 10 MG: at 21:00

## 2023-12-13 RX ADMIN — OXYCODONE AND ACETAMINOPHEN 1 TABLET: 7.5; 325 TABLET ORAL at 22:33

## 2023-12-13 RX ADMIN — INSULIN DETEMIR 60 UNITS: 100 INJECTION, SOLUTION SUBCUTANEOUS at 20:59

## 2023-12-13 RX ADMIN — HYDROCORTISONE 1 APPLICATION: 1 OINTMENT TOPICAL at 20:59

## 2023-12-13 RX ADMIN — BACLOFEN 10 MG: 10 TABLET ORAL at 08:01

## 2023-12-13 RX ADMIN — OXYCODONE AND ACETAMINOPHEN 1 TABLET: 7.5; 325 TABLET ORAL at 16:08

## 2023-12-13 RX ADMIN — INSULIN LISPRO 8 UNITS: 100 INJECTION, SOLUTION INTRAVENOUS; SUBCUTANEOUS at 20:59

## 2023-12-13 RX ADMIN — APIXABAN 5 MG: 5 TABLET, FILM COATED ORAL at 09:31

## 2023-12-13 RX ADMIN — EMPAGLIFLOZIN 10 MG: 10 TABLET, FILM COATED ORAL at 09:31

## 2023-12-13 RX ADMIN — FERROUS SULFATE TAB 325 MG (65 MG ELEMENTAL FE) 325 MG: 325 (65 FE) TAB at 07:58

## 2023-12-13 RX ADMIN — INSULIN LISPRO 4 UNITS: 100 INJECTION, SOLUTION INTRAVENOUS; SUBCUTANEOUS at 17:47

## 2023-12-13 RX ADMIN — BACLOFEN 10 MG: 10 TABLET ORAL at 17:47

## 2023-12-13 RX ADMIN — CYANOCOBALAMIN TAB 500 MCG 1000 MCG: 500 TAB at 09:32

## 2023-12-13 RX ADMIN — LISINOPRIL 2.5 MG: 2.5 TABLET ORAL at 09:31

## 2023-12-13 RX ADMIN — Medication 250 MG: at 09:31

## 2023-12-13 RX ADMIN — HYDROCORTISONE 1 APPLICATION: 1 OINTMENT TOPICAL at 16:09

## 2023-12-13 RX ADMIN — INSULIN LISPRO 4 UNITS: 100 INJECTION, SOLUTION INTRAVENOUS; SUBCUTANEOUS at 12:11

## 2023-12-13 RX ADMIN — PREGABALIN 100 MG: 100 CAPSULE ORAL at 16:11

## 2023-12-13 RX ADMIN — Medication 250 MG: at 21:00

## 2023-12-13 RX ADMIN — ATORVASTATIN CALCIUM 10 MG: 10 TABLET, FILM COATED ORAL at 21:00

## 2023-12-13 RX ADMIN — INSULIN LISPRO 4 UNITS: 100 INJECTION, SOLUTION INTRAVENOUS; SUBCUTANEOUS at 07:58

## 2023-12-13 RX ADMIN — BACLOFEN 10 MG: 10 TABLET ORAL at 23:22

## 2023-12-13 RX ADMIN — PREGABALIN 100 MG: 100 CAPSULE ORAL at 21:00

## 2023-12-13 RX ADMIN — PREGABALIN 100 MG: 100 CAPSULE ORAL at 09:32

## 2023-12-13 RX ADMIN — APIXABAN 5 MG: 5 TABLET, FILM COATED ORAL at 21:00

## 2023-12-13 RX ADMIN — OXYCODONE AND ACETAMINOPHEN 1 TABLET: 7.5; 325 TABLET ORAL at 01:09

## 2023-12-13 RX ADMIN — OXYCODONE AND ACETAMINOPHEN 1 TABLET: 7.5; 325 TABLET ORAL at 09:41

## 2023-12-13 RX ADMIN — HYDROCORTISONE 1 APPLICATION: 1 OINTMENT TOPICAL at 09:32

## 2023-12-13 RX ADMIN — SERTRALINE HYDROCHLORIDE 25 MG: 25 TABLET ORAL at 21:00

## 2023-12-13 RX ADMIN — INSULIN DETEMIR 70 UNITS: 100 INJECTION, SOLUTION SUBCUTANEOUS at 09:32

## 2023-12-13 NOTE — PLAN OF CARE
Goal Outcome Evaluation:              Outcome Evaluation: POC continues at this time.Patient is alert and oriented, patient is able to make needs known, patient did work with therapy this am, patient did get up at side of bed with therapy and did take some steps with therapy assist. Continues to be bedbound for staff. Medicated patient for pain 2 x this shift-see eMAR for medication administration details. Patient uses call light, call light in reach. Patient uses bedpan and urinal.

## 2023-12-13 NOTE — THERAPY TREATMENT NOTE
Acute Care - Physical Therapy Treatment Note  [facility] Doming[facility]     Patient Name: Jose Shaikh  : 1960  MRN: [id number]  Today's Date: 2023      Visit Dx:     ICD-10-CM ICD-9-CM   1. Difficulty in walking  R26.2 719.7     Patient Active Problem List   Diagnosis    Diabetic ulcer of left heel associated with type 2 DM    Acute osteomyelitis of left calcaneus     Diabetic ulcer of left heel associated with type 2 DM    Diabetic ulcer of right midfoot associated with type 2 DM    Paroxysmal atrial fibrillation    Essential hypertension    Hyperlipidemia LDL goal <100    Cellulitis and abscess of foot    High alkaline phosphatase level    Osteomyelitis    Onychomycosis    Onychocryptosis    Foot pain, bilateral    Osteomyelitis of foot, right, acute    Cellulitis of right foot    Type 2 diabetes mellitus, with long-term current use of insulin    Class 3 severe obesity due to excess calories with serious comorbidity and body mass index (BMI) of 45.0 to 49.9 in adult    Anxiety disorder, unspecified    Claustrophobia    Dependence on wheelchair    Depression, unspecified    Long term (current) use of anticoagulants    Long term (current) use of oral hypoglycemic drugs    Wound of foot    Non-prs chronic ulcer oth prt r foot limited to brkdwn skin    Orthostatic hypotension    Other chronic osteomyelitis, right ankle and foot    Personal history of nicotine dependence    Thrombocytopenia, unspecified    Unspecified open wound, right foot, initial encounter    Diabetic foot infection    Subacute osteomyelitis of right foot    Right foot pain    Sepsis    Onychomycosis    Foot pain, left    Impaired mobility and ADLs    Absence of toe of right foot    Corns and callosity    Disability of walking    Fracture    Limb swelling    Polyneuropathy    Pressure ulcer, stage 1    Shortness of breath    Generalized weakness    Debility     Past Medical History:   Diagnosis Date    Absence of toe of right foot     Acute  osteomyelitis of left calcaneus  08/18/2021    Anxiety and depression     Arthritis     Cancer     Claustrophobia     Corns and callus     Diabetic ulcer of left heel associated with type 2 DM 08/18/2021    Diabetic ulcer of left heel associated with type 2 DM 07/06/2021    Diabetic ulcer of right midfoot associated with type 2 DM 08/18/2021    Difficulty walking     Essential hypertension 08/31/2021    Hammertoe     Hyperlipidemia LDL goal <100 08/31/2021    Ingrown toenail     Obesity     Paroxysmal atrial fibrillation 08/31/2021    Polyneuropathy     Pressure ulcer, stage 1     Tinea unguium     Type 2 diabetes mellitus with polyneuropathy      Past Surgical History:   Procedure Laterality Date    CYST REMOVAL      center of back; benign    EYE SURGERY      INCISION AND DRAINAGE ABSCESS      back    INCISION AND DRAINAGE LEG Right 12/10/2021    Procedure: INCISION AND DRAINAGE LOWER EXTREMITY;  Surgeon: Ash Leyva DPM;  Location: [facility];  Service: Podiatry;  Laterality: Right;    OTHER SURGICAL HISTORY      Surgical clips left foot    TOE SURGERY Right     Removal of 5th toe    TRANS METATARSAL AMPUTATION Right 12/02/2021    Procedure: AMPUTATION TRANS METATARSAL;  Surgeon: Ash Leyva DPM;  Location: [facility] OR;  Service: Podiatry;  Laterality: Right;    VASCULAR SURGERY      WRIST SURGERY Left     repair of injury     PT Assessment (last 12 hours)       PT Evaluation and Treatment       Row Name 12/13/23 1300          Physical Therapy Time and Intention    Subjective Information no complaints  -MOE     Document Type therapy note (daily note)  -MOE     Mode of Treatment individual therapy;physical therapy  -MOE       Row Name 12/13/23 1300          Bed Mobility    Bed Mobility bed mobility (all) activities;supine-sit  -MOE     All Activities, [place] (Bed Mobility) standby assist  -MOE     Supine-Sit [place] (Bed Mobility) minimum assist (75% patient effort)  -MOE        Row Name 12/13/23 1300          Sit-Stand Transfer    Sit-Stand [place] (Transfers) minimum assist (75% patient effort);2 person assist  -MOE     Assistive Device (Sit-Stand Transfers) walker, front-wheeled  -MOE       Row Name 12/13/23 1300          Gait/Stairs (Locomotion)    Gait/Stairs Locomotion gait/ambulation assistive device  -MOE     [place] Level (Gait) moderate assist (50% patient effort);2 person assist  -MOE     Assistive Device (Gait) walker, front-wheeled  -MOE     Distance in Feet (Gait) 2 x2  -MOE       Row Name 12/13/23 1300          Safety Issues, Functional Mobility    Impairments Affecting Function (Mobility) balance;endurance/activity tolerance;pain;range of motion (ROM);strength  -MOE       Row Name             Wound 11/07/23 1240 Right anterior foot    Wound - Properties Group Placement Date: 11/07/23  - Placement Time: 1240  -FH Side: Right  -FH Orientation: anterior  -FH Location: foot  -FH    Retired Wound - Properties Group Placement Date: 11/07/23  - Placement Time: 1240  -FH Side: Right  -FH Orientation: anterior  -FH Location: foot  -FH    Retired Wound - Properties Group Date first assessed: 11/07/23  - Time first assessed: 1240  -FH Side: Right  -FH Location: foot  -FH      Row Name 12/13/23 1300          Progress Summary (PT)    Progress Toward Functional Goals (PT) progress toward functional goals is good  -MOE               User Key  (r) = Recorded By, (t) = Taken By, (c) = Cosigned By      Initials Name Provider Type    Carlos Anguiano RN Registered Nurse    Merlin De La O, PT Physical Therapist                    Physical Therapy Education       Title: PT OT SLP Therapies (Done)       Topic: Physical Therapy (Done)       Point: Mobility training (Done)       Learning Progress Summary             Patient Acceptance, E,TB, VU by RM at 11/30/2023 0420    Acceptance, E,TB, VU by MOE at 11/8/2023 1341                         Point: Precautions (Done)        Learning Progress Summary             Patient Acceptance, E,TB, VU by  at 11/30/2023 0420    Acceptance, E,TB, VU by MOE at 11/8/2023 1341                                         User Key       Initials Effective Dates Name Provider Type Discipline     06/08/21 -  Bharath Berg, RN Registered Nurse Nurse    MOE 06/03/21 -  Merlin Rao PT Physical Therapist PT                  PT Recommendation and Plan  Anticipated Discharge Disposition (PT): home with home health  Planned Therapy Interventions (PT): bed mobility training, balance training, gait training, joint mobilization, home exercise program, stair training, strengthening, transfer training  Therapy Frequency (PT): 6 times/wk  Progress Summary (PT)  Progress Toward Functional Goals (PT): progress toward functional goals is good  Daily Progress Summary (PT): Pt has made little to no progress toward his goals.  He is still very weak and will require skilled PT services to address his mobility issues but he will need to show more effort and progress druing this reevaluation period to continue to be appropriate.  Will continue current plan another 30 day period.  Pt was instructed that if no progress is made that we will have no choice but to d/c services due to lack of progress.  Plan of Care Reviewed With: patient  Outcome Evaluation: Patient presents with decreased strength, transfers and ambulation.  Skilled physical therapy services will be required to address these mobility deficits.   Outcome Measures       Row Name 12/11/23 1203             How much help from another person do you currently need...    Turning from your back to your side while in flat bed without using bedrails? 3  -WM      Moving from lying on back to sitting on the side of a flat bed without bedrails? 3  -WM      Moving to and from a bed to a chair (including a wheelchair)? 1  -WM      Standing up from a chair using your arms (e.g., wheelchair, bedside chair)? 2  -WM      Climbing  3-5 steps with a railing? 1  -WM      To walk in hospital room? 1  -WM      AM-PAC 6 Clicks Score (PT) 11  -WM      Highest Level of Mobility Goal 4 --> Transfer to chair/commode  -WM                User Key  (r) = Recorded By, (t) = Taken By, (c) = Cosigned By      Initials Name Provider Type    WM Carson Inman, PTA Physical Therapist Assistant                     Time Calculation:    PT Charges       Row Name 12/13/23 1347             Time Calculation    PT Received On 12/13/23  -MOE         Timed Charges    69309 - Gait Training Minutes  10  -MOE      75659 - PT Therapeutic Activity Minutes 8  -MOE         Total Minutes    Timed Charges Total Minutes 18  -MOE       Total Minutes 18  -MOE                User Key  (r) = Recorded By, (t) = Taken By, (c) = Cosigned By      Initials Name Provider Type    Merlin De La O, PT Physical Therapist                  Therapy Charges for Today       Code Description Service Date Service Provider Modifiers Qty    65500989769 HC GAIT TRAINING EA 15 MIN 12/12/2023 Merlin Rao, PT GP 1            PT G-Codes  Outcome Measure Options: AM-PAC 6 Clicks Daily Activity (OT), Optimal Instrument  AM-PAC 6 Clicks Score (PT): 10  AM-PAC 6 Clicks Score (OT): 15    Merlin Rao, PT  12/13/2023

## 2023-12-13 NOTE — THERAPY TREATMENT NOTE
Patient Name: Jose Shaikh  : 1960    MRN: [id number]                              Today's Date: 2023       Admit Date: 2023    Visit Dx:     ICD-10-CM ICD-9-CM   1. Difficulty in walking  R26.2 719.7     Patient Active Problem List   Diagnosis    Diabetic ulcer of left heel associated with type 2 DM    Acute osteomyelitis of left calcaneus     Diabetic ulcer of left heel associated with type 2 DM    Diabetic ulcer of right midfoot associated with type 2 DM    Paroxysmal atrial fibrillation    Essential hypertension    Hyperlipidemia LDL goal <100    Cellulitis and abscess of foot    High alkaline phosphatase level    Osteomyelitis    Onychomycosis    Onychocryptosis    Foot pain, bilateral    Osteomyelitis of foot, right, acute    Cellulitis of right foot    Type 2 diabetes mellitus, with long-term current use of insulin    Class 3 severe obesity due to excess calories with serious comorbidity and body mass index (BMI) of 45.0 to 49.9 in adult    Anxiety disorder, unspecified    Claustrophobia    Dependence on wheelchair    Depression, unspecified    Long term (current) use of anticoagulants    Long term (current) use of oral hypoglycemic drugs    Wound of foot    Non-prs chronic ulcer oth prt r foot limited to brkdwn skin    Orthostatic hypotension    Other chronic osteomyelitis, right ankle and foot    Personal history of nicotine dependence    Thrombocytopenia, unspecified    Unspecified open wound, right foot, initial encounter    Diabetic foot infection    Subacute osteomyelitis of right foot    Right foot pain    Sepsis    Onychomycosis    Foot pain, left    Impaired mobility and ADLs    Absence of toe of right foot    Corns and callosity    Disability of walking    Fracture    Limb swelling    Polyneuropathy    Pressure ulcer, stage 1    Shortness of breath    Generalized weakness    Debility     Past Medical History:   Diagnosis Date    Absence of toe of right foot     Acute  osteomyelitis of left calcaneus  08/18/2021    Anxiety and depression     Arthritis     Cancer     Claustrophobia     Corns and callus     Diabetic ulcer of left heel associated with type 2 DM 08/18/2021    Diabetic ulcer of left heel associated with type 2 DM 07/06/2021    Diabetic ulcer of right midfoot associated with type 2 DM 08/18/2021    Difficulty walking     Essential hypertension 08/31/2021    Hammertoe     Hyperlipidemia LDL goal <100 08/31/2021    Ingrown toenail     Obesity     Paroxysmal atrial fibrillation 08/31/2021    Polyneuropathy     Pressure ulcer, stage 1     Tinea unguium     Type 2 diabetes mellitus with polyneuropathy      Past Surgical History:   Procedure Laterality Date    CYST REMOVAL      center of back; benign    EYE SURGERY      INCISION AND DRAINAGE ABSCESS      back    INCISION AND DRAINAGE LEG Right 12/10/2021    Procedure: INCISION AND DRAINAGE LOWER EXTREMITY;  Surgeon: sAh Leyva DPM;  Location: [facility];  Service: Podiatry;  Laterality: Right;    OTHER SURGICAL HISTORY      Surgical clips left foot    TOE SURGERY Right     Removal of 5th toe    TRANS METATARSAL AMPUTATION Right 12/02/2021    Procedure: AMPUTATION TRANS METATARSAL;  Surgeon: Ash Leyva DPM;  Location: [facility] OR;  Service: Podiatry;  Laterality: Right;    VASCULAR SURGERY      WRIST SURGERY Left     repair of injury      General Information       Row Name 12/13/23 1104          OT Time and Intention    Document Type therapy note (daily note)  -PG     Mode of Treatment individual therapy;occupational therapy  -PG               User Key  (r) = Recorded By, (t) = Taken By, (c) = Cosigned By      Initials Name Provider Type    PG Kodak Matos OT Occupational Therapist                     Mobility/ADL's       Row Name 12/13/23 1104          Transfers    Transfers sit-stand transfer;stand-sit transfer  -PG       Row Name 12/13/23 1104          Sit-Stand Transfer    Sit-Stand  [place] (Transfers) minimum assist (75% patient effort);2 person assist  -PG     Assistive Device (Sit-Stand Transfers) walker, front-wheeled  -PG       Row Name 12/13/23 1104          Stand-Sit Transfer    Stand-Sit [place] (Transfers) 2 person assist;minimum assist (75% patient effort)  -PG     Assistive Device (Stand-Sit Transfers) walker, front-wheeled;bariatric  -PG               User Key  (r) = Recorded By, (t) = Taken By, (c) = Cosigned By      Initials Name Provider Type    PG Kodak Matos OT Occupational Therapist                   Obj/Interventions       Row Name 12/13/23 1104          Shoulder (Therapeutic Exercise)    Shoulder (Therapeutic Exercise) strengthening exercise  -PG     Shoulder Strengthening (Therapeutic Exercise) 15 repititions;4 lb free weight  -PG       Row Name 12/13/23 1104          Elbow/Forearm (Therapeutic Exercise)    Elbow/Forearm (Therapeutic Exercise) strengthening exercise  -PG     Elbow/Forearm Strengthening (Therapeutic Exercise) 5 lb free weight;15 repititions  -PG       Row Name 12/13/23 1104          Motor Skills    Therapeutic Exercise shoulder;elbow/forearm  -PG               User Key  (r) = Recorded By, (t) = Taken By, (c) = Cosigned By      Initials Name Provider Type    PG Kodak Matos OT Occupational Therapist                   Goals/Plan    No documentation.                  Clinical Impression       Row Name 12/13/23 1105          Plan of Care Review    Progress no change  -PG               User Key  (r) = Recorded By, (t) = Taken By, (c) = Cosigned By      Initials Name Provider Type    Kodak Velazco OT Occupational Therapist                   Outcome Measures       Row Name 12/13/23 1105          How much help from another is currently needed...    Putting on and taking off regular lower body clothing? 1  -PG     Bathing (including washing, rinsing, and drying) 2  -PG     Toileting (which includes using toilet bed pan or urinal) 1  -PG     Putting  on and taking off regular upper body clothing 3  -PG     Taking care of personal grooming (such as brushing teeth) 4  -PG     Eating meals 4  -PG     AM-PAC 6 Clicks Score (OT) 15  -PG       Row Name 12/13/23 0941 12/13/23 0115       How much help from another person do you currently need...    Turning from your back to your side while in flat bed without using bedrails? 3  -TW 3  -FM    Moving from lying on back to sitting on the side of a flat bed without bedrails? 2  -TW 2  -FM    Moving to and from a bed to a chair (including a wheelchair)? 1  -TW 1  -FM    Standing up from a chair using your arms (e.g., wheelchair, bedside chair)? 2  -TW 2  -FM    Climbing 3-5 steps with a railing? 1  -TW 1  -FM    To walk in hospital room? 1  -TW 1  -FM    AM-PAC 6 Clicks Score (PT) 10  -TW 10  -FM    Highest Level of Mobility Goal 4 --> Transfer to chair/commode  -TW 4 --> Transfer to chair/commode  -FM      Row Name 12/13/23 1105          Functional Assessment    Outcome Measure Options AM-PAC 6 Clicks Daily Activity (OT);Optimal Instrument  -PG       Row Name 12/13/23 1105          Optimal Instrument    Optimal Instrument Optimal - 3  -PG     Bending/Stooping 4  -PG     Standing 3  -PG     Reaching 1  -PG               User Key  (r) = Recorded By, (t) = Taken By, (c) = Cosigned By      Initials Name Provider Type    FM Patsy Cano, RN Registered Nurse    Kodak Velazco OT Occupational Therapist    TW Joyce Salcedo, RN Registered Nurse                    Occupational Therapy Education       Title: PT OT SLP Therapies (Done)       Topic: Occupational Therapy (Done)       Point: ADL training (Done)       Description:   Instruct learner(s) on proper safety adaptation and remediation techniques during self care or transfers.   Instruct in proper use of assistive devices.                  Learning Progress Summary             Patient Acceptance, E,TB, VU by  at 11/30/2023 0420    Acceptance, E,D, DU by PG at 11/7/2023  0932                         Point: Home exercise program (Done)       Description:   Instruct learner(s) on appropriate technique for monitoring, assisting and/or progressing therapeutic exercises/activities.                  Learning Progress Summary             Patient Acceptance, E,TB, VU by  at 11/30/2023 0420    Acceptance, E,D, DU by PG at 11/7/2023 0932                         Point: Precautions (Done)       Description:   Instruct learner(s) on prescribed precautions during self-care and functional transfers.                  Learning Progress Summary             Patient Acceptance, E,TB, VU by  at 11/30/2023 0420    Acceptance, E,D, DU by PG at 11/7/2023 0932                         Point: Body mechanics (Done)       Description:   Instruct learner(s) on proper positioning and spine alignment during self-care, functional mobility activities and/or exercises.                  Learning Progress Summary             Patient Acceptance, E,TB, VU by  at 11/30/2023 0420    Acceptance, E,D, DU by PG at 11/7/2023 0932                                         User Key       Initials Effective Dates Name Provider Type Discipline     06/08/21 -  Bharath Berg RN Registered Nurse Nurse     06/16/21 -  Kodak Matos OT Occupational Therapist OT                  OT Recommendation and Plan  Planned Therapy Interventions (OT): activity tolerance training, BADL retraining, strengthening exercise, transfer/mobility retraining, patient/caregiver education/training, occupation/activity based interventions  Therapy Frequency (OT): 5 times/wk  Plan of Care Review  Plan of Care Reviewed With: patient  Progress: no change  Outcome Evaluation: Patient participated in upper body strengthening exercises utilizing 6 pound weights, 2 sets x 15 repetitions.  Patient stood mod assist x 2 and able to advance feet 3 inches with assist to advance right foot forward and 2 others to push bed.  Continue OT services per plan of  care     Time Calculation:   Evaluation Complexity (OT)  Review Occupational Profile/Medical/Therapy History Complexity: brief/low complexity  Assessment, Occupational Performance/Identification of Deficit Complexity: 3-5 performance deficits  Clinical Decision Making Complexity (OT): problem focused assessment/low complexity  Overall Complexity of Evaluation (OT): low complexity     Time Calculation- OT       Row Name 12/13/23 1106             Time Calculation- OT    OT Received On 12/13/23  -PG      OT Goal Re-Cert Due Date 12/27/23  -PG         Timed Charges    80443 - OT Therapeutic Exercise Minutes 20  -PG      77779 - OT Therapeutic Activity Minutes 25  -PG         SNF Occupational Therapy Minutes    Skilled Minutes- OT 45 min  -PG         Total Minutes    Timed Charges Total Minutes 45  -PG       Total Minutes 45  -PG                User Key  (r) = Recorded By, (t) = Taken By, (c) = Cosigned By      Initials Name Provider Type    PG Kodak Matos OT Occupational Therapist                  Therapy Charges for Today       Code Description Service Date Service Provider Modifiers Qty    83982827885 HC OT THER PROC EA 15 MIN 12/12/2023 Kodak Matos OT GO 1    11650259829 HC OT THERAPEUTIC ACT EA 15 MIN 12/12/2023 Kodak Matos OT GO 1    26305012208 HC OT THER PROC EA 15 MIN 12/13/2023 Kodak Matos OT GO 1    72331805775 HC OT THERAPEUTIC ACT EA 15 MIN 12/13/2023 Kodak Matos OT GO 2                 Kodak Matos OT  12/13/2023

## 2023-12-14 LAB
GLUCOSE BLDC GLUCOMTR-MCNC: 124 MG/DL (ref 70–99)
GLUCOSE BLDC GLUCOMTR-MCNC: 161 MG/DL (ref 70–99)
GLUCOSE BLDC GLUCOMTR-MCNC: 194 MG/DL (ref 70–99)
GLUCOSE BLDC GLUCOMTR-MCNC: 279 MG/DL (ref 70–99)

## 2023-12-14 PROCEDURE — 97110 THERAPEUTIC EXERCISES: CPT

## 2023-12-14 PROCEDURE — 97530 THERAPEUTIC ACTIVITIES: CPT

## 2023-12-14 PROCEDURE — 63710000001 INSULIN DETEMIR PER 5 UNITS: Performed by: PHYSICIAN ASSISTANT

## 2023-12-14 PROCEDURE — 63710000001 INSULIN LISPRO (HUMAN) PER 5 UNITS: Performed by: INTERNAL MEDICINE

## 2023-12-14 PROCEDURE — 82948 REAGENT STRIP/BLOOD GLUCOSE: CPT

## 2023-12-14 RX ADMIN — OXYCODONE AND ACETAMINOPHEN 1 TABLET: 7.5; 325 TABLET ORAL at 09:06

## 2023-12-14 RX ADMIN — EMPAGLIFLOZIN 10 MG: 10 TABLET, FILM COATED ORAL at 09:06

## 2023-12-14 RX ADMIN — BACLOFEN 10 MG: 10 TABLET ORAL at 22:41

## 2023-12-14 RX ADMIN — CYANOCOBALAMIN TAB 500 MCG 1000 MCG: 500 TAB at 09:06

## 2023-12-14 RX ADMIN — INSULIN LISPRO 4 UNITS: 100 INJECTION, SOLUTION INTRAVENOUS; SUBCUTANEOUS at 07:48

## 2023-12-14 RX ADMIN — INSULIN LISPRO 4 UNITS: 100 INJECTION, SOLUTION INTRAVENOUS; SUBCUTANEOUS at 11:58

## 2023-12-14 RX ADMIN — FERROUS SULFATE TAB 325 MG (65 MG ELEMENTAL FE) 325 MG: 325 (65 FE) TAB at 07:48

## 2023-12-14 RX ADMIN — HYDROCORTISONE 1 APPLICATION: 1 OINTMENT TOPICAL at 09:07

## 2023-12-14 RX ADMIN — BACLOFEN 10 MG: 10 TABLET ORAL at 10:59

## 2023-12-14 RX ADMIN — OXYCODONE AND ACETAMINOPHEN 1 TABLET: 7.5; 325 TABLET ORAL at 21:50

## 2023-12-14 RX ADMIN — PREGABALIN 100 MG: 100 CAPSULE ORAL at 09:06

## 2023-12-14 RX ADMIN — Medication 250 MG: at 21:50

## 2023-12-14 RX ADMIN — INSULIN DETEMIR 60 UNITS: 100 INJECTION, SOLUTION SUBCUTANEOUS at 21:50

## 2023-12-14 RX ADMIN — Medication 10 MG: at 21:50

## 2023-12-14 RX ADMIN — APIXABAN 5 MG: 5 TABLET, FILM COATED ORAL at 09:06

## 2023-12-14 RX ADMIN — HYDROCORTISONE 1 APPLICATION: 1 OINTMENT TOPICAL at 16:27

## 2023-12-14 RX ADMIN — INSULIN LISPRO 12 UNITS: 100 INJECTION, SOLUTION INTRAVENOUS; SUBCUTANEOUS at 21:50

## 2023-12-14 RX ADMIN — SERTRALINE HYDROCHLORIDE 25 MG: 25 TABLET ORAL at 21:50

## 2023-12-14 RX ADMIN — INSULIN DETEMIR 70 UNITS: 100 INJECTION, SOLUTION SUBCUTANEOUS at 09:06

## 2023-12-14 RX ADMIN — APIXABAN 5 MG: 5 TABLET, FILM COATED ORAL at 21:50

## 2023-12-14 RX ADMIN — LISINOPRIL 2.5 MG: 2.5 TABLET ORAL at 09:06

## 2023-12-14 RX ADMIN — OXYCODONE AND ACETAMINOPHEN 1 TABLET: 7.5; 325 TABLET ORAL at 15:15

## 2023-12-14 RX ADMIN — HYDROCORTISONE 1 APPLICATION: 1 OINTMENT TOPICAL at 21:53

## 2023-12-14 RX ADMIN — PREGABALIN 100 MG: 100 CAPSULE ORAL at 16:27

## 2023-12-14 RX ADMIN — ATORVASTATIN CALCIUM 10 MG: 10 TABLET, FILM COATED ORAL at 21:50

## 2023-12-14 RX ADMIN — PREGABALIN 100 MG: 100 CAPSULE ORAL at 21:50

## 2023-12-14 RX ADMIN — Medication 250 MG: at 09:06

## 2023-12-14 NOTE — THERAPY TREATMENT NOTE
SNF - Occupational Therapy Treatment Note  [facility]    Patient Name: Jose Shaikh  : 1960    MRN: [id number]                              Today's Date: 2023       Admit Date: 2023    Visit Dx:     ICD-10-CM ICD-9-CM   1. Difficulty in walking  R26.2 719.7     Patient Active Problem List   Diagnosis    Diabetic ulcer of left heel associated with type 2 DM    Acute osteomyelitis of left calcaneus     Diabetic ulcer of left heel associated with type 2 DM    Diabetic ulcer of right midfoot associated with type 2 DM    Paroxysmal atrial fibrillation    Essential hypertension    Hyperlipidemia LDL goal <100    Cellulitis and abscess of foot    High alkaline phosphatase level    Osteomyelitis    Onychomycosis    Onychocryptosis    Foot pain, bilateral    Osteomyelitis of foot, right, acute    Cellulitis of right foot    Type 2 diabetes mellitus, with long-term current use of insulin    Class 3 severe obesity due to excess calories with serious comorbidity and body mass index (BMI) of 45.0 to 49.9 in adult    Anxiety disorder, unspecified    Claustrophobia    Dependence on wheelchair    Depression, unspecified    Long term (current) use of anticoagulants    Long term (current) use of oral hypoglycemic drugs    Wound of foot    Non-prs chronic ulcer oth prt r foot limited to brkdwn skin    Orthostatic hypotension    Other chronic osteomyelitis, right ankle and foot    Personal history of nicotine dependence    Thrombocytopenia, unspecified    Unspecified open wound, right foot, initial encounter    Diabetic foot infection    Subacute osteomyelitis of right foot    Right foot pain    Sepsis    Onychomycosis    Foot pain, left    Impaired mobility and ADLs    Absence of toe of right foot    Corns and callosity    Disability of walking    Fracture    Limb swelling    Polyneuropathy    Pressure ulcer, stage 1    Shortness of breath    Generalized weakness    Debility     Past Medical History:   Diagnosis  Date    Absence of toe of right foot     Acute osteomyelitis of left calcaneus  08/18/2021    Anxiety and depression     Arthritis     Cancer     Claustrophobia     Corns and callus     Diabetic ulcer of left heel associated with type 2 DM 08/18/2021    Diabetic ulcer of left heel associated with type 2 DM 07/06/2021    Diabetic ulcer of right midfoot associated with type 2 DM 08/18/2021    Difficulty walking     Essential hypertension 08/31/2021    Hammertoe     Hyperlipidemia LDL goal <100 08/31/2021    Ingrown toenail     Obesity     Paroxysmal atrial fibrillation 08/31/2021    Polyneuropathy     Pressure ulcer, stage 1     Tinea unguium     Type 2 diabetes mellitus with polyneuropathy      Past Surgical History:   Procedure Laterality Date    CYST REMOVAL      center of back; benign    EYE SURGERY      INCISION AND DRAINAGE ABSCESS      back    INCISION AND DRAINAGE LEG Right 12/10/2021    Procedure: INCISION AND DRAINAGE LOWER EXTREMITY;  Surgeon: Ash Leyva DPM;  Location: [facility];  Service: Podiatry;  Laterality: Right;    OTHER SURGICAL HISTORY      Surgical clips left foot    TOE SURGERY Right     Removal of 5th toe    TRANS METATARSAL AMPUTATION Right 12/02/2021    Procedure: AMPUTATION TRANS METATARSAL;  Surgeon: Ash Leyva DPM;  Location: [facility] OR;  Service: Podiatry;  Laterality: Right;    VASCULAR SURGERY      WRIST SURGERY Left     repair of injury      General Information       Row Name 12/14/23 1115          OT Time and Intention    Document Type therapy note (daily note)  -PG     Mode of Treatment individual therapy;occupational therapy  -PG               User Key  (r) = Recorded By, (t) = Taken By, (c) = Cosigned By      Initials Name Provider Type    PG Kodak Matos OT Occupational Therapist                     Mobility/ADL's       Row Name 12/14/23 1115          Transfers    Transfers sit-stand transfer;stand-sit transfer  -PG       Row Name 12/14/23 1115           Sit-Stand Transfer    Sit-Stand [place] (Transfers) 2 person assist;moderate assist (50% patient effort)  -PG     Assistive Device (Sit-Stand Transfers) walker, front-wheeled  -PG       Row Name 12/14/23 1115          Stand-Sit Transfer    Stand-Sit [place] (Transfers) moderate assist (50% patient effort);2 person assist;verbal cues  -PG     Assistive Device (Stand-Sit Transfers) walker, front-wheeled;bariatric  -PG               User Key  (r) = Recorded By, (t) = Taken By, (c) = Cosigned By      Initials Name Provider Type    Kodak Velazco, BEKAH Occupational Therapist                   Obj/Interventions    No documentation.                  Goals/Plan    No documentation.                  Clinical Impression       Row Name 12/14/23 1115          Plan of Care Review    Progress no change  -PG               User Key  (r) = Recorded By, (t) = Taken By, (c) = Cosigned By      Initials Name Provider Type    Kodak Velazco OT Occupational Therapist                   Outcome Measures       Row Name 12/14/23 1115          How much help from another is currently needed...    Putting on and taking off regular lower body clothing? 1  -PG     Bathing (including washing, rinsing, and drying) 2  -PG     Toileting (which includes using toilet bed pan or urinal) 1  -PG     Putting on and taking off regular upper body clothing 3  -PG     Taking care of personal grooming (such as brushing teeth) 3  -PG     Eating meals 4  -PG     AM-PAC 6 Clicks Score (OT) 14  -PG       [facility] Name 12/14/23 1115          Functional Assessment    Outcome Measure Options AM-PAC 6 Clicks Daily Activity (OT);Optimal Instrument  -PG       Row Name 12/14/23 1115          Optimal Instrument    Optimal Instrument Optimal - 3  -PG     Bending/Stooping 4  -PG     Standing 4  -PG     Reaching 2  -PG               User Key  (r) = Recorded By, (t) = Taken By, (c) = Cosigned By      Initials Name Provider Type    Kodak Velazco, OT  Occupational Therapist                  Section G              Section GG  SectionGG: Functional Ability/Goals, Adm  Self Care, Prior Functioning ([id number]): 1. Dependent  Upper Extremity Range of Motion (LT8529L): No impairment  Self Care, Admission (Section GG)  Eating: Self-Care Admission Performance (VW6233E3): independent (06)  Oral Hygiene: Self-Care Admission Performance (LI7554L7): independent (06)  Toileting Hygiene: Self-Care Admission Performance (HZ2083E5): dependent (01)  Shower/Bathe Self: Self-Care Admission Performance (QF3889M0): substantial/maximal assistance (02)  Upper Body Dressing: Self-Care Admission Performance (SZ6756G3): partial/moderate assistance (03)  Lower Body Dressing: Self-Care Admission Performance (SK9100O1): dependent (01)  Putting On/Taking Off Footwear: Self-Care Admission Performance (AS3311S0): dependent (01)  Personal Hygiene: Self-Care Admission Performance (HE4588M8): dependent (01)  Mobility, Admission Performance (KF3377)  Toilet Transfer: Mobility Admission Performance (DN5315X4): not attempted, medical condition/safety concern (88)  Section GG: Functional Ability/Goals, DC  Eating: Self-Care Discharge Goal (EA6521V9): independent (06)  Oral Hygiene: Self-Care Discharge Goal (UQ9001F8): independent (06)  Shower/Bathe Self: Self-Care Discharge Goal (YJ7982J6): supervision or touching assistance (04)  Upper Body Dressing: Self-Care Discharge Goal (JC9291V1): setup or clean-up assistance (05)  Lower Body Dressing: Self-Care Discharge Goal (YD1217D5): supervision or touching assistance (04)  Putting On/Taking Off Footwear: Self-Care Discharge Goal (DA2690W2): supervision or touching assistance (04)  Personal Hygiene: Self-Care Discharge Goal (NY9875P4): supervision or touching assistance (04)  Mobility (GG), Discharge Goal (LO0213)  Toilet Transfer: Mobility Discharge Goal (DN9051S0): supervision or touching assistance (04)          Occupational Therapy Education       Title:  PT OT SLP Therapies (Done)       Topic: Occupational Therapy (Done)       Point: ADL training (Done)       Description:   Instruct learner(s) on proper safety adaptation and remediation techniques during self care or transfers.   Instruct in proper use of assistive devices.                  Learning Progress Summary             Patient Acceptance, E,TB, VU by  at 11/30/2023 0420    Acceptance, E,D, DU by PG at 11/7/2023 0932                         Point: Home exercise program (Done)       Description:   Instruct learner(s) on appropriate technique for monitoring, assisting and/or progressing therapeutic exercises/activities.                  Learning Progress Summary             Patient Acceptance, E,TB, VU by  at 11/30/2023 0420    Acceptance, E,D, DU by PG at 11/7/2023 0932                         Point: Precautions (Done)       Description:   Instruct learner(s) on prescribed precautions during self-care and functional transfers.                  Learning Progress Summary             Patient Acceptance, E,TB, VU by  at 11/30/2023 0420    Acceptance, E,D, DU by PG at 11/7/2023 0932                         Point: Body mechanics (Done)       Description:   Instruct learner(s) on proper positioning and spine alignment during self-care, functional mobility activities and/or exercises.                  Learning Progress Summary             Patient Acceptance, E,TB, VU by  at 11/30/2023 0420    Acceptance, E,D, DU by PG at 11/7/2023 0932                                         User Key       Initials Effective Dates Name Provider Type Discipline     06/08/21 -  Bharath Berg RN Registered Nurse Nurse     06/16/21 -  Kodak Matos OT Occupational Therapist OT                  OT Recommendation and Plan  Planned Therapy Interventions (OT): activity tolerance training, BADL retraining, strengthening exercise, transfer/mobility retraining, patient/caregiver education/training, occupation/activity based  interventions  Therapy Frequency (OT): 5 times/wk  Plan of Care Review  Plan of Care Reviewed With: patient  Progress: no change  Outcome Evaluation: Patient participated in upper body strengthening exercises utilizing 6 pound weights, 2 sets x 15 repetitions.  Patient stood mod assist x 2 and able to advance feet 3 inches with assist to advance right foot forward and 2 others to push bed.  Continue OT services per plan of care     Time Calculation:   Evaluation Complexity (OT)  Review Occupational Profile/Medical/Therapy History Complexity: brief/low complexity  Assessment, Occupational Performance/Identification of Deficit Complexity: 3-5 performance deficits  Clinical Decision Making Complexity (OT): problem focused assessment/low complexity  Overall Complexity of Evaluation (OT): low complexity     Time Calculation- OT       Row Name 12/14/23 1117 12/14/23 1116          Time Calculation- OT    OT Received On -- 12/14/23  -PG     OT Goal Re-Cert Due Date -- 12/27/23  -PG        Timed Charges    14671 - OT Therapeutic Activity Minutes -- 30  -PG        SNF Occupational Therapy Minutes    Skilled Minutes- OT 30 min  -PG --        Total Minutes    Timed Charges Total Minutes -- 30  -PG      Total Minutes -- 30  -PG               User Key  (r) = Recorded By, (t) = Taken By, (c) = Cosigned By      Initials Name Provider Type    PG Kodak Matos OT Occupational Therapist                  Therapy Charges for Today       Code Description Service Date Service Provider Modifiers Qty    40061791553 HC OT THER PROC EA 15 MIN 12/13/2023 Kodak Matos OT GO 1    77790901248 HC OT THERAPEUTIC ACT EA 15 MIN 12/13/2023 Kodak Matos OT GO 2    74971012767 HC OT THERAPEUTIC ACT EA 15 MIN 12/14/2023 Kodak Matos OT GO 2                 Kodak Matos OT  12/14/2023

## 2023-12-14 NOTE — PLAN OF CARE
Goal Outcome Evaluation:   Alert and oriented and pleasant with staff. 2 to 3 Max assist for transfers and ambulation. X3 admin PRN pain medication, with relief of symptoms noted. Pt transferred to recliner with PT/OT this shift, and showed much improved endurance and mobility this shift. Sitting up in bed, call light in reach.

## 2023-12-14 NOTE — THERAPY TREATMENT NOTE
SNF - Physical Therapy Treatment Note  [facility] Dominguez     Patient Name: Jose Shaikh  : 1960  MRN: [id number]  Today's Date: 2023      Visit Dx:    ICD-10-CM ICD-9-CM   1. Difficulty in walking  R26.2 719.7     Patient Active Problem List   Diagnosis    Diabetic ulcer of left heel associated with type 2 DM    Acute osteomyelitis of left calcaneus     Diabetic ulcer of left heel associated with type 2 DM    Diabetic ulcer of right midfoot associated with type 2 DM    Paroxysmal atrial fibrillation    Essential hypertension    Hyperlipidemia LDL goal <100    Cellulitis and abscess of foot    High alkaline phosphatase level    Osteomyelitis    Onychomycosis    Onychocryptosis    Foot pain, bilateral    Osteomyelitis of foot, right, acute    Cellulitis of right foot    Type 2 diabetes mellitus, with long-term current use of insulin    Class 3 severe obesity due to excess calories with serious comorbidity and body mass index (BMI) of 45.0 to 49.9 in adult    Anxiety disorder, unspecified    Claustrophobia    Dependence on wheelchair    Depression, unspecified    Long term (current) use of anticoagulants    Long term (current) use of oral hypoglycemic drugs    Wound of foot    Non-prs chronic ulcer oth prt r foot limited to brkdwn skin    Orthostatic hypotension    Other chronic osteomyelitis, right ankle and foot    Personal history of nicotine dependence    Thrombocytopenia, unspecified    Unspecified open wound, right foot, initial encounter    Diabetic foot infection    Subacute osteomyelitis of right foot    Right foot pain    Sepsis    Onychomycosis    Foot pain, left    Impaired mobility and ADLs    Absence of toe of right foot    Corns and callosity    Disability of walking    Fracture    Limb swelling    Polyneuropathy    Pressure ulcer, stage 1    Shortness of breath    Generalized weakness    Debility     Past Medical History:   Diagnosis Date    Absence of toe of right foot     Acute osteomyelitis  of left calcaneus  08/18/2021    Anxiety and depression     Arthritis     Cancer     Claustrophobia     Corns and callus     Diabetic ulcer of left heel associated with type 2 DM 08/18/2021    Diabetic ulcer of left heel associated with type 2 DM 07/06/2021    Diabetic ulcer of right midfoot associated with type 2 DM 08/18/2021    Difficulty walking     Essential hypertension 08/31/2021    Hammertoe     Hyperlipidemia LDL goal <100 08/31/2021    Ingrown toenail     Obesity     Paroxysmal atrial fibrillation 08/31/2021    Polyneuropathy     Pressure ulcer, stage 1     Tinea unguium     Type 2 diabetes mellitus with polyneuropathy      Past Surgical History:   Procedure Laterality Date    CYST REMOVAL      center of back; benign    EYE SURGERY      INCISION AND DRAINAGE ABSCESS      back    INCISION AND DRAINAGE LEG Right 12/10/2021    Procedure: INCISION AND DRAINAGE LOWER EXTREMITY;  Surgeon: Ash Leyva DPM;  Location: [facility];  Service: Podiatry;  Laterality: Right;    OTHER SURGICAL HISTORY      Surgical clips left foot    TOE SURGERY Right     Removal of 5th toe    TRANS METATARSAL AMPUTATION Right 12/02/2021    Procedure: AMPUTATION TRANS METATARSAL;  Surgeon: Ash Leyva DPM;  Location: [facility];  Service: Podiatry;  Laterality: Right;    VASCULAR SURGERY      WRIST SURGERY Left     repair of injury       PT Assessment (last 12 hours)       PT Evaluation and Treatment       Row Name 12/14/23 1229          Physical Therapy Time and Intention    Document Type therapy note (daily note)  -WM     Mode of Treatment individual therapy;physical therapy  -WM     Patient Effort adequate  -WM     Symptoms Noted During/After Treatment fatigue  -WM       Row Name 12/14/23 1229          Bed Mobility    Supine-Sit-Supine [place] (Bed Mobility) moderate assist (50% patient effort);1 person assist  -WM     Bed Mobility, Safety Issues decreased use of legs for bridging/pushing  -WM      Assistive Device (Bed Mobility) bed rails;draw sheet;overhead trapeze  -       Row Name 12/14/23 1229          Sit-Stand Transfer    Sit-Stand [place] (Transfers) moderate assist (50% patient effort);2 person assist  -     Assistive Device (Sit-Stand Transfers) bariatric;walker, front-wheeled  -       Row Name 12/14/23 1229          Stand-Sit Transfer    Stand-Sit [place] (Transfers) moderate assist (50% patient effort);2 person assist  -     Assistive Device (Stand-Sit Transfers) bariatric;walker, front-wheeled  -       Row Name 12/14/23 1229          Safety Issues, Functional Mobility    Impairments Affecting Function (Mobility) balance;endurance/activity tolerance;pain;strength  -       Row Name 12/14/23 1229          Hip (Therapeutic Exercise)    Hip AAROM (Therapeutic Exercise) bilateral;aBduction;aDduction;supine;10 repetitions;2 sets  -     Hip Isometrics (Therapeutic Exercise) bilateral;gluteal sets;supine;10 repetitions;3 second hold;2 sets  -     Hip Strengthening (Therapeutic Exercise) bilateral;heel slides;supine;20 repititions  Manual resistance  -       Row Name 12/14/23 1229          Knee (Therapeutic Exercise)    Knee Isometrics (Therapeutic Exercise) bilateral;quad sets;supine;10 repetitions;3 second hold;2 sets  -     Knee Strengthening (Therapeutic Exercise) bilateral;SAQ (short arc quad);supine;4 lb free weight;20 repititions  Active-assisted SLR  -       Row Name 12/14/23 1229          Ankle (Therapeutic Exercise)    Ankle AROM (Therapeutic Exercise) bilateral;dorsiflexion;plantarflexion;supine;20 repititions  -       Row Name             Wound 11/07/23 1240 Right anterior foot    Wound - Properties Group Placement Date: 11/07/23  -FH Placement Time: 1240  -FH Side: Right  -FH Orientation: anterior  -FH Location: foot  -FH    Retired Wound - Properties Group Placement Date: 11/07/23  -FH Placement Time: 1240  -FH Side: Right  -FH Orientation: anterior  -FH  Location: foot  -FH    Retired Wound - Properties Group Date first assessed: 11/07/23  -FH Time first assessed: 1240  -FH Side: Right  -FH Location: foot  -FH      Row Name 12/14/23 1229          Positioning and Restraints    Pre-Treatment Position in bed  -WM     Post Treatment Position bed  -WM     In Bed fowlers;call light within reach;encouraged to call for assist  -WM       Row Name 12/14/23 1229          Progress Summary (PT)    Progress Toward Functional Goals (PT) progress toward functional goals is fair  -WM     Daily Progress Summary (PT) Pt stood x 2 with modA but was not able to walk. Pt was returned to supine and was transferred to recliner using transfers sheet and assist x 5. Pt was in recliner ~ 2 hours and was returned to bed.  -WM               User Key  (r) = Recorded By, (t) = Taken By, (c) = Cosigned By      Initials Name Provider Type     Carlos Cagle RN Registered Nurse    Carson Johnson PTA Physical Therapist Assistant                  Section G              Section GG                       Physical Therapy Education       Title: PT OT SLP Therapies (Done)       Topic: Physical Therapy (Done)       Point: Mobility training (Done)       Learning Progress Summary             Patient Acceptance, E,TB, VU by  at 11/30/2023 0420    Acceptance, E,TB, VU by MOE at 11/8/2023 1341                         Point: Precautions (Done)       Learning Progress Summary             Patient Acceptance, E,TB, VU by  at 11/30/2023 0420    Acceptance, E,TB, VU by MOE at 11/8/2023 1341                                         User Key       Initials Effective Dates Name Provider Type [facility] 06/08/21 -  Bharath Berg RN Registered Nurse Nurse    MOE 06/03/21 -  Merlin Rao PT Physical Therapist PT                  PT Recommendation and Plan     Progress Summary (PT)  Progress Toward Functional Goals (PT): progress toward functional goals is fair  Daily Progress Summary (PT): Pt stood  x 2 with modA but was not able to walk. Pt was returned to supine and was transferred to recliner using transfers sheet and assist x 5. Pt was in recliner ~ 2 hours and was returned to bed.   Outcome Measures       Row Name 12/14/23 1239             How much help from another person do you currently need...    Turning from your back to your side while in flat bed without using bedrails? 3  -WM      Moving from lying on back to sitting on the side of a flat bed without bedrails? 2  -WM      Moving to and from a bed to a chair (including a wheelchair)? 1  -WM      Standing up from a chair using your arms (e.g., wheelchair, bedside chair)? 2  -WM      Climbing 3-5 steps with a railing? 1  -WM      To walk in hospital room? 2  -WM      AM-PAC 6 Clicks Score (PT) 11  -WM      Highest Level of Mobility Goal 4 --> Transfer to chair/commode  -WM                User Key  (r) = Recorded By, (t) = Taken By, (c) = Cosigned By      Initials Name Provider Type    Carson Johnson PTA Physical Therapist Assistant                     Time Calculation:    PT Charges       Row Name 12/14/23 1226             Time Calculation    PT Received On 12/14/23  -WM         Timed Charges    62662 - PT Therapeutic Exercise Minutes 16  -WM      55194 - PT Therapeutic Activity Minutes 40  -WM         SNF Physical Therapy Minutes    Skilled Minutes- PT 56 min  -WM         Total Minutes    Timed Charges Total Minutes 56  -WM       Total Minutes 56  -WM                User Key  (r) = Recorded By, (t) = Taken By, (c) = Cosigned By      Initials Name Provider Type    Carson Johnson PTA Physical Therapist Assistant                      PT G-Codes  Outcome Measure Options: AM-PAC 6 Clicks Daily Activity (OT), Optimal Instrument  AM-PAC 6 Clicks Score (PT): 11  AM-PAC 6 Clicks Score (OT): 14    Carson Inman PTA  12/14/2023

## 2023-12-15 LAB
GLUCOSE BLDC GLUCOMTR-MCNC: 136 MG/DL (ref 70–99)
GLUCOSE BLDC GLUCOMTR-MCNC: 164 MG/DL (ref 70–99)
GLUCOSE BLDC GLUCOMTR-MCNC: 166 MG/DL (ref 70–99)
GLUCOSE BLDC GLUCOMTR-MCNC: 174 MG/DL (ref 70–99)
SARS-COV-2 RNA RESP QL NAA+PROBE: NOT DETECTED

## 2023-12-15 PROCEDURE — 63710000001 INSULIN LISPRO (HUMAN) PER 5 UNITS: Performed by: INTERNAL MEDICINE

## 2023-12-15 PROCEDURE — 63710000001 INSULIN DETEMIR PER 5 UNITS: Performed by: PHYSICIAN ASSISTANT

## 2023-12-15 PROCEDURE — 97110 THERAPEUTIC EXERCISES: CPT

## 2023-12-15 PROCEDURE — 87635 SARS-COV-2 COVID-19 AMP PRB: CPT | Performed by: PHYSICIAN ASSISTANT

## 2023-12-15 PROCEDURE — 82948 REAGENT STRIP/BLOOD GLUCOSE: CPT

## 2023-12-15 RX ADMIN — INSULIN DETEMIR 70 UNITS: 100 INJECTION, SOLUTION SUBCUTANEOUS at 08:18

## 2023-12-15 RX ADMIN — PREGABALIN 100 MG: 100 CAPSULE ORAL at 15:08

## 2023-12-15 RX ADMIN — OXYCODONE AND ACETAMINOPHEN 1 TABLET: 7.5; 325 TABLET ORAL at 10:37

## 2023-12-15 RX ADMIN — APIXABAN 5 MG: 5 TABLET, FILM COATED ORAL at 20:14

## 2023-12-15 RX ADMIN — OXYCODONE AND ACETAMINOPHEN 1 TABLET: 7.5; 325 TABLET ORAL at 16:56

## 2023-12-15 RX ADMIN — HYDROCORTISONE 1 APPLICATION: 1 OINTMENT TOPICAL at 15:08

## 2023-12-15 RX ADMIN — OXYCODONE AND ACETAMINOPHEN 1 TABLET: 7.5; 325 TABLET ORAL at 04:23

## 2023-12-15 RX ADMIN — CYANOCOBALAMIN TAB 500 MCG 1000 MCG: 500 TAB at 08:19

## 2023-12-15 RX ADMIN — INSULIN LISPRO 4 UNITS: 100 INJECTION, SOLUTION INTRAVENOUS; SUBCUTANEOUS at 08:18

## 2023-12-15 RX ADMIN — HYDROCORTISONE 1 APPLICATION: 1 OINTMENT TOPICAL at 20:23

## 2023-12-15 RX ADMIN — Medication 10 MG: at 20:15

## 2023-12-15 RX ADMIN — INSULIN LISPRO 4 UNITS: 100 INJECTION, SOLUTION INTRAVENOUS; SUBCUTANEOUS at 20:15

## 2023-12-15 RX ADMIN — FERROUS SULFATE TAB 325 MG (65 MG ELEMENTAL FE) 325 MG: 325 (65 FE) TAB at 08:19

## 2023-12-15 RX ADMIN — INSULIN LISPRO 4 UNITS: 100 INJECTION, SOLUTION INTRAVENOUS; SUBCUTANEOUS at 12:28

## 2023-12-15 RX ADMIN — INSULIN DETEMIR 60 UNITS: 100 INJECTION, SOLUTION SUBCUTANEOUS at 20:16

## 2023-12-15 RX ADMIN — SERTRALINE HYDROCHLORIDE 25 MG: 25 TABLET ORAL at 20:14

## 2023-12-15 RX ADMIN — PREGABALIN 100 MG: 100 CAPSULE ORAL at 20:14

## 2023-12-15 RX ADMIN — OXYCODONE AND ACETAMINOPHEN 1 TABLET: 7.5; 325 TABLET ORAL at 22:54

## 2023-12-15 RX ADMIN — Medication 250 MG: at 20:15

## 2023-12-15 RX ADMIN — Medication 250 MG: at 08:19

## 2023-12-15 RX ADMIN — ATORVASTATIN CALCIUM 10 MG: 10 TABLET, FILM COATED ORAL at 20:15

## 2023-12-15 RX ADMIN — BACLOFEN 10 MG: 10 TABLET ORAL at 06:34

## 2023-12-15 RX ADMIN — BACLOFEN 10 MG: 10 TABLET ORAL at 15:06

## 2023-12-15 RX ADMIN — PREGABALIN 100 MG: 100 CAPSULE ORAL at 08:19

## 2023-12-15 RX ADMIN — LISINOPRIL 2.5 MG: 2.5 TABLET ORAL at 08:19

## 2023-12-15 RX ADMIN — EMPAGLIFLOZIN 10 MG: 10 TABLET, FILM COATED ORAL at 08:19

## 2023-12-15 RX ADMIN — APIXABAN 5 MG: 5 TABLET, FILM COATED ORAL at 08:19

## 2023-12-15 RX ADMIN — HYDROCORTISONE 1 APPLICATION: 1 OINTMENT TOPICAL at 08:20

## 2023-12-15 NOTE — PLAN OF CARE
Goal Outcome Evaluation:              Outcome Evaluation: POC continues at this time.Patient is alert andf oriented and able to make needs known. Patient uses urinal and bedpan. See eMAR for medication administration details. Call light in reach.

## 2023-12-15 NOTE — THERAPY TREATMENT NOTE
SNF - Physical Therapy Treatment Note  [facility] Doming[facility]     Patient Name: Jose Shaikh  : 1960  MRN: [id number]  Today's Date: 12/15/2023      Visit Dx:    ICD-10-CM ICD-9-CM   1. Difficulty in walking  R26.2 719.7     Patient Active Problem List   Diagnosis    Diabetic ulcer of left heel associated with type 2 DM    Acute osteomyelitis of left calcaneus     Diabetic ulcer of left heel associated with type 2 DM    Diabetic ulcer of right midfoot associated with type 2 DM    Paroxysmal atrial fibrillation    Essential hypertension    Hyperlipidemia LDL goal <100    Cellulitis and abscess of foot    High alkaline phosphatase level    Osteomyelitis    Onychomycosis    Onychocryptosis    Foot pain, bilateral    Osteomyelitis of foot, right, acute    Cellulitis of right foot    Type 2 diabetes mellitus, with long-term current use of insulin    Class 3 severe obesity due to excess calories with serious comorbidity and body mass index (BMI) of 45.0 to 49.9 in adult    Anxiety disorder, unspecified    Claustrophobia    Dependence on wheelchair    Depression, unspecified    Long term (current) use of anticoagulants    Long term (current) use of oral hypoglycemic drugs    Wound of foot    Non-prs chronic ulcer oth prt r foot limited to brkdwn skin    Orthostatic hypotension    Other chronic osteomyelitis, right ankle and foot    Personal history of nicotine dependence    Thrombocytopenia, unspecified    Unspecified open wound, right foot, initial encounter    Diabetic foot infection    Subacute osteomyelitis of right foot    Right foot pain    Sepsis    Onychomycosis    Foot pain, left    Impaired mobility and ADLs    Absence of toe of right foot    Corns and callosity    Disability of walking    Fracture    Limb swelling    Polyneuropathy    Pressure ulcer, stage 1    Shortness of breath    Generalized weakness    Debility     Past Medical History:   Diagnosis Date    Absence of toe of right foot     Acute osteomyelitis  of left calcaneus  08/18/2021    Anxiety and depression     Arthritis     Cancer     Claustrophobia     Corns and callus     Diabetic ulcer of left heel associated with type 2 DM 08/18/2021    Diabetic ulcer of left heel associated with type 2 DM 07/06/2021    Diabetic ulcer of right midfoot associated with type 2 DM 08/18/2021    Difficulty walking     Essential hypertension 08/31/2021    Hammertoe     Hyperlipidemia LDL goal <100 08/31/2021    Ingrown toenail     Obesity     Paroxysmal atrial fibrillation 08/31/2021    Polyneuropathy     Pressure ulcer, stage 1     Tinea unguium     Type 2 diabetes mellitus with polyneuropathy      Past Surgical History:   Procedure Laterality Date    CYST REMOVAL      center of back; benign    EYE SURGERY      INCISION AND DRAINAGE ABSCESS      back    INCISION AND DRAINAGE LEG Right 12/10/2021    Procedure: INCISION AND DRAINAGE LOWER EXTREMITY;  Surgeon: Ash Leyva DPM;  Location: [facility];  Service: Podiatry;  Laterality: Right;    OTHER SURGICAL HISTORY      Surgical clips left foot    TOE SURGERY Right     Removal of 5th toe    TRANS METATARSAL AMPUTATION Right 12/02/2021    Procedure: AMPUTATION TRANS METATARSAL;  Surgeon: Ash Leyva DPM;  Location: [facility];  Service: Podiatry;  Laterality: Right;    VASCULAR SURGERY      WRIST SURGERY Left     repair of injury       PT Assessment (last 12 hours)       PT Evaluation and Treatment       Row Name 12/15/23 3653          Physical Therapy Time and Intention    Subjective Information complains of;pain  -WM     Document Type therapy note (daily note)  -WM     Mode of Treatment individual therapy;physical therapy  -WM     Patient Effort good  -WM     Symptoms Noted During/After Treatment fatigue  -WM       Row Name 12/15/23 9041          Pain    Pretreatment Pain Rating 2/10  -WM     Posttreatment Pain Rating 5/10  -WM     Pain Location - Side/Orientation Left  -WM     Pain Location - hip;knee   -     Pain Intervention(s) Rest;Emotional support;Repositioned  -       Row Name 12/15/23 1335          Hip (Therapeutic Exercise)    Hip AAROM (Therapeutic Exercise) bilateral;aBduction;aDduction;supine;10 repetitions;2 sets  -     Hip Isometrics (Therapeutic Exercise) bilateral;gluteal sets;supine;10 repetitions;3 second hold;2 sets  -     Hip Strengthening (Therapeutic Exercise) bilateral;heel slides;supine;20 repititions  Manual resistance  -       Row Name 12/15/23 1335          Knee (Therapeutic Exercise)    Knee Isometrics (Therapeutic Exercise) bilateral;quad sets;supine;10 repetitions;3 second hold;2 sets  -     Knee Strengthening (Therapeutic Exercise) bilateral;SAQ (short arc quad);supine;4 lb free weight;20 repititions;SLR (straight leg raise)  4 lb wt used with SAQ  -       Row Name 12/15/23 1335          Ankle (Therapeutic Exercise)    Ankle AROM (Therapeutic Exercise) bilateral;dorsiflexion;plantarflexion;supine;20 repititions  -       Row Name             Wound 11/07/23 1240 Right anterior foot    Wound - Properties Group Placement Date: 11/07/23  - Placement Time: 1240  -FH Side: Right  -FH Orientation: anterior  -FH Location: foot  -FH    Retired Wound - Properties Group Placement Date: 11/07/23  - Placement Time: 1240  -FH Side: Right  -FH Orientation: anterior  -FH Location: foot  -FH    Retired Wound - Properties Group Date first assessed: 11/07/23  - Time first assessed: 1240  -FH Side: Right  -FH Location: foot  -FH      Row Name 12/15/23 1335          Positioning and Restraints    Pre-Treatment Position in bed  -     Post Treatment Position bed  -     In Bed supine;call light within reach;encouraged to call for assist  -       Row Name 12/15/23 2191          Progress Summary (PT)    Progress Toward Functional Goals (PT) progress toward functional goals is fair  -     Daily Progress Summary (PT) Pt declined transfers today secondary to using bed pad numerous  times.  -WM               User Key  (r) = Recorded By, (t) = Taken By, (c) = Cosigned By      Initials Name Provider Type     Carlos Cagle, RN Registered Nurse    Carson Johnson PTA Physical Therapist Assistant                  Section G              Section GG                       Physical Therapy Education       Title: PT OT SLP Therapies (Done)       Topic: Physical Therapy (Done)       Point: Mobility training (Done)       Learning Progress Summary             Patient Acceptance, E,TB, VU by  at 11/30/2023 0420    Acceptance, E,TB, VU by MOE at 11/8/2023 1341                         Point: Precautions (Done)       Learning Progress Summary             Patient Acceptance, E,TB, VU by  at 11/30/2023 0420    Acceptance, E,TB, VU by MOE at 11/8/2023 1341                                         User Key       Initials Effective Dates Name Provider Type Discipline     06/08/21 -  Bharath Berg RN Registered Nurse Nurse    [facility]E 06/03/21 -  Merlin Rao PT Physical Therapist PT                  PT Recommendation and Plan     Progress Summary (PT)  Progress Toward Functional Goals (PT): progress toward functional goals is fair  Daily Progress Summary (PT): Pt declined transfers today secondary to using bed pad numerous times.   Outcome Measures       Row Name 12/15/23 1342 12/14/23 1239          How much help from another person do you currently need...    Turning from your back to your side while in flat bed without using bedrails? 3  -WM 3  -WM     Moving from lying on back to sitting on the side of a flat bed without bedrails? 2  -WM 2  -WM     Moving to and from a bed to a chair (including a wheelchair)? 1  -WM 1  -WM     Standing up from a chair using your arms (e.g., wheelchair, bedside chair)? 2  -WM 2  -WM     Climbing 3-5 steps with a railing? 1  -WM 1  -WM     To walk in hospital room? 2  -WM 2  -WM     AM-PAC 6 Clicks Score (PT) 11  -WM 11  -WM     Highest Level of Mobility Goal 4 -->  Transfer to chair/commode  -WM 4 --> Transfer to chair/commode  -WM               User Key  (r) = Recorded By, (t) = Taken By, (c) = Cosigned By      Initials Name Provider Type    Carson Johnson PTA Physical Therapist Assistant                     Time Calculation:    PT Charges       Row Name 12/15/23 1335             Time Calculation    PT Received On 12/15/23  -WM         Timed Charges    77330 - PT Therapeutic Exercise Minutes 17  -WM         SNF Physical Therapy Minutes    Skilled Minutes- PT 17 min  -WM         Total Minutes    Timed Charges Total Minutes 17  -WM       Total Minutes 17  -WM                User Key  (r) = Recorded By, (t) = Taken By, (c) = Cosigned By      Initials Name Provider Type    Carson Johnson PTA Physical Therapist Assistant                  Therapy Charges for Today       Code Description Service Date Service Provider Modifiers Qty    22207625909 HC PT THER PROC EA 15 MIN 12/14/2023 Carson Inman PTA GP 1    32994433389 HC PT THERAPEUTIC ACT EA 15 MIN 12/14/2023 Carson Inman PTA GP 3            PT G-Codes  Outcome Measure Options: AM-PAC 6 Clicks Daily Activity (OT), Optimal Instrument  AM-PAC 6 Clicks Score (PT): 11  AM-PAC 6 Clicks Score (OT): 14    Carson Inman PTA  12/15/2023

## 2023-12-16 LAB
GLUCOSE BLDC GLUCOMTR-MCNC: 114 MG/DL (ref 70–99)
GLUCOSE BLDC GLUCOMTR-MCNC: 129 MG/DL (ref 70–99)
GLUCOSE BLDC GLUCOMTR-MCNC: 131 MG/DL (ref 70–99)
GLUCOSE BLDC GLUCOMTR-MCNC: 149 MG/DL (ref 70–99)

## 2023-12-16 PROCEDURE — 63710000001 INSULIN DETEMIR PER 5 UNITS: Performed by: PHYSICIAN ASSISTANT

## 2023-12-16 PROCEDURE — 63710000001 ONDANSETRON ODT 4 MG TABLET DISPERSIBLE: Performed by: INTERNAL MEDICINE

## 2023-12-16 PROCEDURE — 97530 THERAPEUTIC ACTIVITIES: CPT

## 2023-12-16 PROCEDURE — 97110 THERAPEUTIC EXERCISES: CPT

## 2023-12-16 PROCEDURE — 82948 REAGENT STRIP/BLOOD GLUCOSE: CPT

## 2023-12-16 RX ADMIN — OXYCODONE AND ACETAMINOPHEN 1 TABLET: 7.5; 325 TABLET ORAL at 13:14

## 2023-12-16 RX ADMIN — OXYCODONE AND ACETAMINOPHEN 1 TABLET: 7.5; 325 TABLET ORAL at 05:42

## 2023-12-16 RX ADMIN — Medication 250 MG: at 08:36

## 2023-12-16 RX ADMIN — LISINOPRIL 2.5 MG: 2.5 TABLET ORAL at 08:37

## 2023-12-16 RX ADMIN — OXYCODONE AND ACETAMINOPHEN 1 TABLET: 7.5; 325 TABLET ORAL at 19:50

## 2023-12-16 RX ADMIN — BACLOFEN 10 MG: 10 TABLET ORAL at 04:18

## 2023-12-16 RX ADMIN — Medication 10 MG: at 20:06

## 2023-12-16 RX ADMIN — FERROUS SULFATE TAB 325 MG (65 MG ELEMENTAL FE) 325 MG: 325 (65 FE) TAB at 08:36

## 2023-12-16 RX ADMIN — CYANOCOBALAMIN TAB 500 MCG 1000 MCG: 500 TAB at 08:37

## 2023-12-16 RX ADMIN — APIXABAN 5 MG: 5 TABLET, FILM COATED ORAL at 20:06

## 2023-12-16 RX ADMIN — INSULIN DETEMIR 60 UNITS: 100 INJECTION, SOLUTION SUBCUTANEOUS at 20:07

## 2023-12-16 RX ADMIN — INSULIN DETEMIR 70 UNITS: 100 INJECTION, SOLUTION SUBCUTANEOUS at 08:36

## 2023-12-16 RX ADMIN — APIXABAN 5 MG: 5 TABLET, FILM COATED ORAL at 08:37

## 2023-12-16 RX ADMIN — Medication 250 MG: at 20:06

## 2023-12-16 RX ADMIN — PREGABALIN 100 MG: 100 CAPSULE ORAL at 08:36

## 2023-12-16 RX ADMIN — HYDROCORTISONE 1 APPLICATION: 1 OINTMENT TOPICAL at 20:08

## 2023-12-16 RX ADMIN — SERTRALINE HYDROCHLORIDE 25 MG: 25 TABLET ORAL at 20:06

## 2023-12-16 RX ADMIN — PREGABALIN 100 MG: 100 CAPSULE ORAL at 20:06

## 2023-12-16 RX ADMIN — BACLOFEN 10 MG: 10 TABLET ORAL at 17:19

## 2023-12-16 RX ADMIN — EMPAGLIFLOZIN 10 MG: 10 TABLET, FILM COATED ORAL at 08:36

## 2023-12-16 RX ADMIN — ONDANSETRON 4 MG: 4 TABLET, ORALLY DISINTEGRATING ORAL at 12:34

## 2023-12-16 RX ADMIN — HYDROCORTISONE 1 APPLICATION: 1 OINTMENT TOPICAL at 16:00

## 2023-12-16 RX ADMIN — ATORVASTATIN CALCIUM 10 MG: 10 TABLET, FILM COATED ORAL at 20:06

## 2023-12-16 RX ADMIN — PREGABALIN 100 MG: 100 CAPSULE ORAL at 17:19

## 2023-12-16 NOTE — PLAN OF CARE
Goal Outcome Evaluation:           Progress: no change  Outcome Evaluation: Patient is alert and oriented. C/O nausea at 1234 and was given PRN Zofran which was effective. Given PRN Percocet at 1314 for c/o severe left hip pain. Given PRN Baclofen at 1719 for c/o muscle spasms. Both meds effective. Refused turns for nurse, but was willing to stand at bedside for PT. Patient is 2 max assist with standing. Voices needs. Con't current POC.

## 2023-12-16 NOTE — THERAPY TREATMENT NOTE
SNF - Physical Therapy Progress Note  KEO Dominguez     Patient Name: Jose Shaikh  : 1960  MRN: [id number]  Today's Date: 2023      Visit Dx:    ICD-10-CM ICD-9-CM   1. Difficulty in walking  R26.2 719.7     Patient Active Problem List   Diagnosis    Diabetic ulcer of left heel associated with type 2 DM    Acute osteomyelitis of left calcaneus     Diabetic ulcer of left heel associated with type 2 DM    Diabetic ulcer of right midfoot associated with type 2 DM    Paroxysmal atrial fibrillation    Essential hypertension    Hyperlipidemia LDL goal <100    Cellulitis and abscess of foot    High alkaline phosphatase level    Osteomyelitis    Onychomycosis    Onychocryptosis    Foot pain, bilateral    Osteomyelitis of foot, right, acute    Cellulitis of right foot    Type 2 diabetes mellitus, with long-term current use of insulin    Class 3 severe obesity due to excess calories with serious comorbidity and body mass index (BMI) of 45.0 to 49.9 in adult    Anxiety disorder, unspecified    Claustrophobia    Dependence on wheelchair    Depression, unspecified    Long term (current) use of anticoagulants    Long term (current) use of oral hypoglycemic drugs    Wound of foot    Non-prs chronic ulcer oth prt r foot limited to brkdwn skin    Orthostatic hypotension    Other chronic osteomyelitis, right ankle and foot    Personal history of nicotine dependence    Thrombocytopenia, unspecified    Unspecified open wound, right foot, initial encounter    Diabetic foot infection    Subacute osteomyelitis of right foot    Right foot pain    Sepsis    Onychomycosis    Foot pain, left    Impaired mobility and ADLs    Absence of toe of right foot    Corns and callosity    Disability of walking    Fracture    Limb swelling    Polyneuropathy    Pressure ulcer, stage 1    Shortness of breath    Generalized weakness    Debility     Past Medical History:   Diagnosis Date    Absence of toe of right foot     Acute osteomyelitis of  left calcaneus  08/18/2021    Anxiety and depression     Arthritis     Cancer     Claustrophobia     Corns and callus     Diabetic ulcer of left heel associated with type 2 DM 08/18/2021    Diabetic ulcer of left heel associated with type 2 DM 07/06/2021    Diabetic ulcer of right midfoot associated with type 2 DM 08/18/2021    Difficulty walking     Essential hypertension 08/31/2021    Hammertoe     Hyperlipidemia LDL goal <100 08/31/2021    Ingrown toenail     Obesity     Paroxysmal atrial fibrillation 08/31/2021    Polyneuropathy     Pressure ulcer, stage 1     Tinea unguium     Type 2 diabetes mellitus with polyneuropathy      Past Surgical History:   Procedure Laterality Date    CYST REMOVAL      center of back; benign    EYE SURGERY      INCISION AND DRAINAGE ABSCESS      back    INCISION AND DRAINAGE LEG Right 12/10/2021    Procedure: INCISION AND DRAINAGE LOWER EXTREMITY;  Surgeon: Ash Leyva DPM;  Location: [facility];  Service: Podiatry;  Laterality: Right;    OTHER SURGICAL HISTORY      Surgical clips left foot    TOE SURGERY Right     Removal of 5th toe    TRANS METATARSAL AMPUTATION Right 12/02/2021    Procedure: AMPUTATION TRANS METATARSAL;  Surgeon: Ash Leyva DPM;  Location: [facility];  Service: Podiatry;  Laterality: Right;    VASCULAR SURGERY      WRIST SURGERY Left     repair of injury       PT Assessment (last 12 hours)       PT Evaluation and Treatment       Row Name 12/16/23 1700          Physical Therapy Time and Intention    Subjective Information complains of;pain  -CS     Document Type therapy note (daily note)  -CS     Mode of Treatment individual therapy;physical therapy  -CS     Patient Effort good  -CS     Symptoms Noted During/After Treatment fatigue  -CS       Row Name 12/16/23 1700          Pain    Pretreatment Pain Rating 3/10  -CS     Posttreatment Pain Rating 5/10  -CS     Pain Location - Side/Orientation Left  -CS     Pain Location generalized   -CS     Pain Location - hip;knee  -CS       Row Name 12/16/23 1700          Bed Mobility    Scooting/Bridging [place] (Bed Mobility) contact guard  with bed in [place] position  -CS     Supine-Sit [place] (Bed Mobility) minimum assist (75% patient effort);1 person assist  -CS     Sit-Supine [place] (Bed Mobility) minimum assist (75% patient effort);1 person assist  -CS     Bed Mobility, Safety Issues decreased use of legs for bridging/pushing  -CS     Assistive Device (Bed Mobility) bed rails;draw sheet;overhead trapeze  -CS       Row Name 12/16/23 1700          Sit-Stand Transfer    Sit-Stand [place] (Transfers) moderate assist (50% patient effort);2 person assist  -CS     Assistive Device (Sit-Stand Transfers) bariatric;walker, front-wheeled  -CS       Row Name 12/16/23 1700          Stand-Sit Transfer    Stand-Sit [place] (Transfers) moderate assist (50% patient effort);2 person assist  -CS     Assistive Device (Stand-Sit Transfers) bariatric;walker, front-wheeled  -       Row Name 12/16/23 1700          Hip (Therapeutic Exercise)    Hip AROM (Therapeutic Exercise) bilateral;aBduction;aDduction;supine;external rotation;internal rotation;30 repititions;right  R side hip IR/ER only  -     Hip AAROM (Therapeutic Exercise) left;external rotation;internal rotation;supine;10 repetitions;3 sets  -CS     Hip Isometrics (Therapeutic Exercise) bilateral;gluteal sets;supine;10 repetitions;3 second hold;3 sets  -       Row Name 12/16/23 1700          Knee (Therapeutic Exercise)    Knee AROM (Therapeutic Exercise) bilateral;SLR (straight leg raise);30 repititions  x 11 reps on L LE  -CS     Knee AAROM (Therapeutic Exercise) left;supine  x 19 reps SLR's  -CS     Knee Isometrics (Therapeutic Exercise) bilateral;quad sets;supine;10 repetitions;3 second hold;3 sets  -     Knee Strengthening (Therapeutic Exercise) bilateral;SAQ (short arc quad);4 lb free weight;supine;30 repititions  -CS        Row Name 12/16/23 1700          Ankle (Therapeutic Exercise)    Ankle AROM (Therapeutic Exercise) bilateral;dorsiflexion;plantarflexion;supine;30 repititions  -CS       Row Name             Wound 11/07/23 1240 Right anterior foot    Wound - Properties Group Placement Date: 11/07/23  - Placement Time: 1240  -FH Side: Right  -FH Orientation: anterior  -FH Location: foot  -FH    Retired Wound - Properties Group Placement Date: 11/07/23  - Placement Time: 1240  -FH Side: Right  -FH Orientation: anterior  -FH Location: foot  -FH    Retired Wound - Properties Group Date first assessed: 11/07/23  - Time first assessed: 1240  -FH Side: Right  -FH Location: foot  -FH      Row Name 12/16/23 1700          Positioning and Restraints    Pre-Treatment Position in bed  -CS     Post Treatment Position bed  -CS     In Bed fowlers;call light within reach;encouraged to call for assist  -CS       Row Name 12/16/23 1700          Progress Summary (PT)    Progress Toward Functional Goals (PT) progress toward functional goals is fair  -CS               User Key  (r) = Recorded By, (t) = Taken By, (c) = Cosigned By      Initials Name Provider Type     Carlos Cagle RN Registered Nurse    Ronald Huerta PTA Physical Therapist Assistant                  Section G              Section GG                       Physical Therapy Education       Title: PT OT SLP Therapies (Done)       Topic: Physical Therapy (Done)       Point: Mobility training (Done)       Learning Progress Summary             Patient Acceptance, E,TB, VU by  at 11/30/2023 0420    Acceptance, E,TB, VU by MOE at 11/8/2023 1341                         Point: Precautions (Done)       Learning Progress Summary             Patient Acceptance, E,TB, VU by  at 11/30/2023 0420    Acceptance, E,TB, VU by MOE at 11/8/2023 1341                                         User Key       Initials Effective Dates Name Provider Type [facility] 06/08/21 -  Otis  Bharath RN Registered Nurse Nurse    [facility]E 06/03/21 -  Merlin Rao, PT Physical Therapist PT                  PT Recommendation and Plan     Progress Summary (PT)  Progress Toward Functional Goals (PT): progress toward functional goals is fair  Daily Progress Summary (PT): Decreased assistance for ther-ex's today in comparison to treatment I performed with pt. last weekend.  Pt. still requires assistance but overall, assistance level decreased with range of motion in bilateral lower extremeties.   Outcome Measures       Row Name 12/16/23 1700 12/15/23 1342 12/14/23 1239       How much help from another person do you currently need...    Turning from your back to your side while in flat bed without using bedrails? 3  -CS 3  -WM 3  -WM    Moving from lying on back to sitting on the side of a flat bed without bedrails? 2  -CS 2  -WM 2  -WM    Moving to and from a bed to a chair (including a wheelchair)? 1  -CS 1  -WM 1  -WM    Standing up from a chair using your arms (e.g., wheelchair, bedside chair)? 2  -CS 2  -WM 2  -WM    Climbing 3-5 steps with a railing? 1  -CS 1  -WM 1  -WM    To walk in hospital room? 2  -CS 2  -WM 2  -WM    AM-PAC 6 Clicks Score (PT) 11  -CS 11  -WM 11  -WM    Highest Level of Mobility Goal 4 --> Transfer to chair/commode  -CS 4 --> Transfer to chair/commode  -WM 4 --> Transfer to chair/commode  -WM       Functional Assessment    Outcome Measure Options AM-PAC 6 Clicks Basic Mobility (PT)  -CS -- --              User Key  (r) = Recorded By, (t) = Taken By, (c) = Cosigned By      Initials Name Provider Type    WM Carson Inman PTA Physical Therapist Assistant    Ronald Huerta PTA Physical Therapist Assistant                     Time Calculation:    PT Charges       Row Name 12/16/23 1709             Time Calculation    Start Time 1450  -CS      PT Received On 12/16/23  -         Timed Charges    07529 - PT Therapeutic Exercise Minutes 22  -CS      45656 - Gait Training Minutes  4   -CS      69461 - PT Therapeutic Activity Minutes 18  -CS         SNF Physical Therapy Minutes    Skilled Minutes- PT 44 min  -CS         Total Minutes    Timed Charges Total Minutes 44  -CS       Total Minutes 44  -CS                User Key  (r) = Recorded By, (t) = Taken By, (c) = Cosigned By      Initials Name Provider Type    CS Ronald Fabian PTA Physical Therapist Assistant                  Therapy Charges for Today       Code Description Service Date Service Provider Modifiers Qty    67473556299 HC PT THER PROC EA 15 MIN 12/16/2023 Ronald Fabian PTA GP 2    72934071886 HC PT THERAPEUTIC ACT EA 15 MIN 12/16/2023 Ronald Fabian PTA GP 1            PT G-Codes  Outcome Measure Options: AM-PAC 6 Clicks Basic Mobility (PT)  AM-PAC 6 Clicks Score (PT): 11  AM-PAC 6 Clicks Score (OT): 14    Ronald Fabian PTA  12/16/2023

## 2023-12-16 NOTE — PLAN OF CARE
Caller: Manuel Agarwal    Relationship: SELF    Best call back number: [phone]    Requested Prescriptions: HYDROcodone-acetaminophen (Norco) 5-325 MG per tablet       Requested Prescriptions      No prescriptions requested or ordered in this encounter        Pharmacy where request should be sent:  [facility] [address] [place] [address] [phone] [facility] [phone] [address] [address]     Last office visit with prescribing clinician: 1/23/2023   Last telemedicine visit with prescribing clinician: 4/14/2023   Next office visit with prescribing clinician: 4/14/2023     Additional details provided by patient: PT IS SCHEDULED FOR A VASECTOMY ON 4-14-23, BY THE TIME THE PT WENT TO  PRESCRIPTION IT HAS BEEN RECALLED. PT NEEDS IT SENT AGAIN. PT DID  THE ALPRAZOLAM.       Does the patient have less than a 3 day supply:  [] Yes  [x] No    Would you like a call back once the refill request has been completed: [x] Yes [] No    If the office needs to give you a call back, can they leave a voicemail: [x] Yes [] No    Stanton Marshall Rep   03/31/23 09:54 CDT            Goal Outcome Evaluation:  Plan of Care Reviewed With: patient           Outcome Evaluation: Pt is AO x 4 and VSS. He uses the urinal and the bedpan. He c/o pain and discomfort x 2 and was medicated and has been given muscle relaxer once this shift. Will continue with his plan of care. Call light and personal items within reach.

## 2023-12-17 LAB
GLUCOSE BLDC GLUCOMTR-MCNC: 112 MG/DL (ref 70–99)
GLUCOSE BLDC GLUCOMTR-MCNC: 119 MG/DL (ref 70–99)
GLUCOSE BLDC GLUCOMTR-MCNC: 165 MG/DL (ref 70–99)
GLUCOSE BLDC GLUCOMTR-MCNC: 189 MG/DL (ref 70–99)

## 2023-12-17 PROCEDURE — 63710000001 INSULIN LISPRO (HUMAN) PER 5 UNITS: Performed by: INTERNAL MEDICINE

## 2023-12-17 PROCEDURE — 63710000001 INSULIN DETEMIR PER 5 UNITS: Performed by: PHYSICIAN ASSISTANT

## 2023-12-17 PROCEDURE — 82948 REAGENT STRIP/BLOOD GLUCOSE: CPT

## 2023-12-17 RX ADMIN — Medication 250 MG: at 09:16

## 2023-12-17 RX ADMIN — OXYCODONE AND ACETAMINOPHEN 1 TABLET: 7.5; 325 TABLET ORAL at 15:26

## 2023-12-17 RX ADMIN — BACLOFEN 10 MG: 10 TABLET ORAL at 12:20

## 2023-12-17 RX ADMIN — APIXABAN 5 MG: 5 TABLET, FILM COATED ORAL at 09:16

## 2023-12-17 RX ADMIN — EMPAGLIFLOZIN 10 MG: 10 TABLET, FILM COATED ORAL at 09:16

## 2023-12-17 RX ADMIN — SERTRALINE HYDROCHLORIDE 25 MG: 25 TABLET ORAL at 20:10

## 2023-12-17 RX ADMIN — HYDROCORTISONE 1 APPLICATION: 1 OINTMENT TOPICAL at 20:11

## 2023-12-17 RX ADMIN — PREGABALIN 100 MG: 100 CAPSULE ORAL at 20:10

## 2023-12-17 RX ADMIN — CYANOCOBALAMIN TAB 500 MCG 1000 MCG: 500 TAB at 09:16

## 2023-12-17 RX ADMIN — ATORVASTATIN CALCIUM 10 MG: 10 TABLET, FILM COATED ORAL at 20:10

## 2023-12-17 RX ADMIN — Medication 250 MG: at 20:10

## 2023-12-17 RX ADMIN — BACLOFEN 10 MG: 10 TABLET ORAL at 01:11

## 2023-12-17 RX ADMIN — OXYCODONE AND ACETAMINOPHEN 1 TABLET: 7.5; 325 TABLET ORAL at 23:07

## 2023-12-17 RX ADMIN — PREGABALIN 100 MG: 100 CAPSULE ORAL at 15:25

## 2023-12-17 RX ADMIN — HYDROCORTISONE 1 APPLICATION: 1 OINTMENT TOPICAL at 16:41

## 2023-12-17 RX ADMIN — Medication 10 MG: at 20:10

## 2023-12-17 RX ADMIN — HYDROCORTISONE 1 APPLICATION: 1 OINTMENT TOPICAL at 09:17

## 2023-12-17 RX ADMIN — PREGABALIN 100 MG: 100 CAPSULE ORAL at 09:16

## 2023-12-17 RX ADMIN — APIXABAN 5 MG: 5 TABLET, FILM COATED ORAL at 20:10

## 2023-12-17 RX ADMIN — INSULIN DETEMIR 70 UNITS: 100 INJECTION, SOLUTION SUBCUTANEOUS at 09:15

## 2023-12-17 RX ADMIN — FERROUS SULFATE TAB 325 MG (65 MG ELEMENTAL FE) 325 MG: 325 (65 FE) TAB at 09:16

## 2023-12-17 RX ADMIN — INSULIN DETEMIR 60 UNITS: 100 INJECTION, SOLUTION SUBCUTANEOUS at 20:10

## 2023-12-17 RX ADMIN — OXYCODONE AND ACETAMINOPHEN 1 TABLET: 7.5; 325 TABLET ORAL at 09:16

## 2023-12-17 RX ADMIN — OXYCODONE AND ACETAMINOPHEN 1 TABLET: 7.5; 325 TABLET ORAL at 02:44

## 2023-12-17 RX ADMIN — INSULIN LISPRO 4 UNITS: 100 INJECTION, SOLUTION INTRAVENOUS; SUBCUTANEOUS at 20:11

## 2023-12-17 RX ADMIN — LISINOPRIL 2.5 MG: 2.5 TABLET ORAL at 09:16

## 2023-12-17 RX ADMIN — INSULIN LISPRO 4 UNITS: 100 INJECTION, SOLUTION INTRAVENOUS; SUBCUTANEOUS at 12:20

## 2023-12-17 NOTE — PLAN OF CARE
Goal Outcome Evaluation:  Plan of Care Reviewed With: patient           Outcome Evaluation: Pt ia AO x 4 and VSS. He uses a urinal and bedpan during the day and night. No c/o nausea through the night. He was medicated x 2 for pain and x1 with Baclofen for spasms. Will continue with his plan of care. Call light and personal items within reach.

## 2023-12-17 NOTE — PLAN OF CARE
Goal Outcome Evaluation:              Outcome Evaluation: POC continues at this time. Patient is alert and oriented and able to make needs known. Patient uses urinal and bedpan. Medicated 2x this shift for pain and 1 x  for muscle spasm-see eMAR for medication administration. Call light in reach.

## 2023-12-18 LAB
GLUCOSE BLDC GLUCOMTR-MCNC: 113 MG/DL (ref 70–99)
GLUCOSE BLDC GLUCOMTR-MCNC: 122 MG/DL (ref 70–99)
GLUCOSE BLDC GLUCOMTR-MCNC: 152 MG/DL (ref 70–99)
GLUCOSE BLDC GLUCOMTR-MCNC: 219 MG/DL (ref 70–99)
SARS-COV-2 RNA RESP QL NAA+PROBE: NOT DETECTED

## 2023-12-18 PROCEDURE — 63710000001 INSULIN LISPRO (HUMAN) PER 5 UNITS: Performed by: INTERNAL MEDICINE

## 2023-12-18 PROCEDURE — 97110 THERAPEUTIC EXERCISES: CPT

## 2023-12-18 PROCEDURE — 97530 THERAPEUTIC ACTIVITIES: CPT

## 2023-12-18 PROCEDURE — 63710000001 INSULIN DETEMIR PER 5 UNITS: Performed by: PHYSICIAN ASSISTANT

## 2023-12-18 PROCEDURE — 82948 REAGENT STRIP/BLOOD GLUCOSE: CPT

## 2023-12-18 PROCEDURE — 87635 SARS-COV-2 COVID-19 AMP PRB: CPT | Performed by: PHYSICIAN ASSISTANT

## 2023-12-18 RX ADMIN — BACLOFEN 10 MG: 10 TABLET ORAL at 22:33

## 2023-12-18 RX ADMIN — APIXABAN 5 MG: 5 TABLET, FILM COATED ORAL at 08:23

## 2023-12-18 RX ADMIN — OXYCODONE AND ACETAMINOPHEN 1 TABLET: 7.5; 325 TABLET ORAL at 06:11

## 2023-12-18 RX ADMIN — Medication 10 MG: at 21:13

## 2023-12-18 RX ADMIN — Medication 250 MG: at 21:13

## 2023-12-18 RX ADMIN — CYANOCOBALAMIN TAB 500 MCG 1000 MCG: 500 TAB at 08:23

## 2023-12-18 RX ADMIN — PREGABALIN 100 MG: 100 CAPSULE ORAL at 21:13

## 2023-12-18 RX ADMIN — ATORVASTATIN CALCIUM 10 MG: 10 TABLET, FILM COATED ORAL at 21:13

## 2023-12-18 RX ADMIN — APIXABAN 5 MG: 5 TABLET, FILM COATED ORAL at 21:13

## 2023-12-18 RX ADMIN — PREGABALIN 100 MG: 100 CAPSULE ORAL at 08:23

## 2023-12-18 RX ADMIN — INSULIN DETEMIR 70 UNITS: 100 INJECTION, SOLUTION SUBCUTANEOUS at 08:24

## 2023-12-18 RX ADMIN — Medication 250 MG: at 08:23

## 2023-12-18 RX ADMIN — HYDROCORTISONE 1 APPLICATION: 1 OINTMENT TOPICAL at 08:24

## 2023-12-18 RX ADMIN — SERTRALINE HYDROCHLORIDE 25 MG: 25 TABLET ORAL at 21:13

## 2023-12-18 RX ADMIN — HYDROCORTISONE 1 APPLICATION: 1 OINTMENT TOPICAL at 21:13

## 2023-12-18 RX ADMIN — OXYCODONE AND ACETAMINOPHEN 1 TABLET: 7.5; 325 TABLET ORAL at 12:03

## 2023-12-18 RX ADMIN — LISINOPRIL 2.5 MG: 2.5 TABLET ORAL at 08:24

## 2023-12-18 RX ADMIN — INSULIN LISPRO 8 UNITS: 100 INJECTION, SOLUTION INTRAVENOUS; SUBCUTANEOUS at 21:14

## 2023-12-18 RX ADMIN — INSULIN DETEMIR 60 UNITS: 100 INJECTION, SOLUTION SUBCUTANEOUS at 21:14

## 2023-12-18 RX ADMIN — EMPAGLIFLOZIN 10 MG: 10 TABLET, FILM COATED ORAL at 08:24

## 2023-12-18 RX ADMIN — BACLOFEN 10 MG: 10 TABLET ORAL at 02:57

## 2023-12-18 RX ADMIN — HYDROCORTISONE 1 APPLICATION: 1 OINTMENT TOPICAL at 16:52

## 2023-12-18 RX ADMIN — FERROUS SULFATE TAB 325 MG (65 MG ELEMENTAL FE) 325 MG: 325 (65 FE) TAB at 08:23

## 2023-12-18 RX ADMIN — PREGABALIN 100 MG: 100 CAPSULE ORAL at 16:52

## 2023-12-18 RX ADMIN — INSULIN LISPRO 4 UNITS: 100 INJECTION, SOLUTION INTRAVENOUS; SUBCUTANEOUS at 12:01

## 2023-12-18 RX ADMIN — OXYCODONE AND ACETAMINOPHEN 1 TABLET: 7.5; 325 TABLET ORAL at 20:02

## 2023-12-18 RX ADMIN — BACLOFEN 10 MG: 10 TABLET ORAL at 14:13

## 2023-12-18 NOTE — PLAN OF CARE
Goal Outcome Evaluation:           Progress: no change  Outcome Evaluation: Alert and oriented x4.C/O severe throbbing left hip pain qat 1203 and was given PRN Percocet which was somewhat effective. C/O left hip pain and muscle spasms at 1413 and was given PRN Baclofen. Med effective. Refused turns stating can turn himself and uses trapeze bar to assist with repositioning. Voices needs. Con't current POC.

## 2023-12-18 NOTE — THERAPY TREATMENT NOTE
Patient Name: Jose Shaikh  : 1960    MRN: [id number]                              Today's Date: 2023       Admit Date: 2023    Visit Dx:     ICD-10-CM ICD-9-CM   1. Difficulty in walking  R26.2 719.7     Patient Active Problem List   Diagnosis    Diabetic ulcer of left heel associated with type 2 DM    Acute osteomyelitis of left calcaneus     Diabetic ulcer of left heel associated with type 2 DM    Diabetic ulcer of right midfoot associated with type 2 DM    Paroxysmal atrial fibrillation    Essential hypertension    Hyperlipidemia LDL goal <100    Cellulitis and abscess of foot    High alkaline phosphatase level    Osteomyelitis    Onychomycosis    Onychocryptosis    Foot pain, bilateral    Osteomyelitis of foot, right, acute    Cellulitis of right foot    Type 2 diabetes mellitus, with long-term current use of insulin    Class 3 severe obesity due to excess calories with serious comorbidity and body mass index (BMI) of 45.0 to 49.9 in adult    Anxiety disorder, unspecified    Claustrophobia    Dependence on wheelchair    Depression, unspecified    Long term (current) use of anticoagulants    Long term (current) use of oral hypoglycemic drugs    Wound of foot    Non-prs chronic ulcer oth prt r foot limited to brkdwn skin    Orthostatic hypotension    Other chronic osteomyelitis, right ankle and foot    Personal history of nicotine dependence    Thrombocytopenia, unspecified    Unspecified open wound, right foot, initial encounter    Diabetic foot infection    Subacute osteomyelitis of right foot    Right foot pain    Sepsis    Onychomycosis    Foot pain, left    Impaired mobility and ADLs    Absence of toe of right foot    Corns and callosity    Disability of walking    Fracture    Limb swelling    Polyneuropathy    Pressure ulcer, stage 1    Shortness of breath    Generalized weakness    Debility     Past Medical History:   Diagnosis Date    Absence of toe of right foot     Acute  osteomyelitis of left calcaneus  08/18/2021    Anxiety and depression     Arthritis     Cancer     Claustrophobia     Corns and callus     Diabetic ulcer of left heel associated with type 2 DM 08/18/2021    Diabetic ulcer of left heel associated with type 2 DM 07/06/2021    Diabetic ulcer of right midfoot associated with type 2 DM 08/18/2021    Difficulty walking     Essential hypertension 08/31/2021    Hammertoe     Hyperlipidemia LDL goal <100 08/31/2021    Ingrown toenail     Obesity     Paroxysmal atrial fibrillation 08/31/2021    Polyneuropathy     Pressure ulcer, stage 1     Tinea unguium     Type 2 diabetes mellitus with polyneuropathy      Past Surgical History:   Procedure Laterality Date    CYST REMOVAL      center of back; benign    EYE SURGERY      INCISION AND DRAINAGE ABSCESS      back    INCISION AND DRAINAGE LEG Right 12/10/2021    Procedure: INCISION AND DRAINAGE LOWER EXTREMITY;  Surgeon: Ash Leyva DPM;  Location: [facility];  Service: Podiatry;  Laterality: Right;    OTHER SURGICAL HISTORY      Surgical clips left foot    TOE SURGERY Right     Removal of 5th toe    TRANS METATARSAL AMPUTATION Right 12/02/2021    Procedure: AMPUTATION TRANS METATARSAL;  Surgeon: Ash Leyva DPM;  Location: [facility] OR;  Service: Podiatry;  Laterality: Right;    VASCULAR SURGERY      WRIST SURGERY Left     repair of injury      General Information       Row Name 12/18/23 1300          OT Time and Intention    Document Type therapy note (daily note)  -PG     Mode of Treatment individual therapy;occupational therapy  -PG               User Key  (r) = Recorded By, (t) = Taken By, (c) = Cosigned By      Initials Name Provider Type    PG Kodak Matos OT Occupational Therapist                     Mobility/ADL's       Row Name 12/18/23 1300          Transfers    Transfers sit-stand transfer;stand-sit transfer  -PG       Row Name 12/18/23 1300          Sit-Stand Transfer    Sit-Stand  [place] (Transfers) moderate assist (50% patient effort);2 person assist;verbal cues  -PG     Assistive Device (Sit-Stand Transfers) bariatric;walker, front-wheeled  -PG       Row Name 12/18/23 1300          Stand-Sit Transfer    Stand-Sit [place] (Transfers) moderate assist (50% patient effort);2 person assist;verbal cues  -PG     Assistive Device (Stand-Sit Transfers) bariatric;walker, front-wheeled  -PG               User Key  (r) = Recorded By, (t) = Taken By, (c) = Cosigned By      Initials Name Provider Type    PG Kodak Matos, BEKAH Occupational Therapist                   Obj/Interventions    No documentation.                  Goals/Plan    No documentation.                  Clinical Impression       Row Name 12/18/23 1301          Plan of Care Review    Progress no change  -PG               User Key  (r) = Recorded By, (t) = Taken By, (c) = Cosigned By      Initials Name Provider Type    Kodak Velazco, OT Occupational Therapist                   Outcome Measures       Row Name 12/18/23 1301          How much help from another is currently needed...    Putting on and taking off regular lower body clothing? 1  -PG     Bathing (including washing, rinsing, and drying) 2  -PG     Toileting (which includes using toilet bed pan or urinal) 1  -PG     Putting on and taking off regular upper body clothing 3  -PG     Taking care of personal grooming (such as brushing teeth) 3  -PG     Eating meals 4  -PG     AM-PAC 6 Clicks Score (OT) 14  -PG       Row Name 12/18/23 1027 12/18/23 0900       How much help from another person do you currently need...    Turning from your back to your side while in flat bed without using bedrails? 3  -WM 3  -OR    Moving from lying on back to sitting on the side of a flat bed without bedrails? 2  -WM 2  -OR    Moving to and from a bed to a chair (including a wheelchair)? 1  -WM 1  -OR    Standing up from a chair using your arms (e.g., wheelchair, bedside chair)? 2  -WM 2  -OR     Climbing 3-5 steps with a railing? 1  -WM 1  -DE    To walk in hospital room? 1  -WM 1  -DE    AM-PAC 6 Clicks Score (PT) 10  -WM 10  -DE    Highest Level of Mobility Goal 4 --> Transfer to chair/commode  -WM 4 --> Transfer to chair/commode  -DE      Row Name 12/18/23 1301          Functional Assessment    Outcome Measure Options AM-PAC 6 Clicks Daily Activity (OT);Optimal Instrument  -PG       Row Name 12/18/23 1301          Optimal Instrument    Optimal Instrument Optimal - 3  -PG     Bending/Stooping 4  -PG     Standing 4  -PG     Reaching 2  -PG               User Key  (r) = Recorded By, (t) = Taken By, (c) = Cosigned By      Initials Name Provider Type    DE Nicole Kahn, RN Registered Nurse    Carson Johnson PTA Physical Therapist Assistant    Kodak Velazco, OT Occupational Therapist                    Occupational Therapy Education       Title: PT OT SLP Therapies (Done)       Topic: Occupational Therapy (Done)       Point: ADL training (Done)       Description:   Instruct learner(s) on proper safety adaptation and remediation techniques during self care or transfers.   Instruct in proper use of assistive devices.                  Learning Progress Summary             Patient Acceptance, E,TB, VU by  at 11/30/2023 0420    Acceptance, E,D, DU by PG at 11/7/2023 0932                         Point: Home exercise program (Done)       Description:   Instruct learner(s) on appropriate technique for monitoring, assisting and/or progressing therapeutic exercises/activities.                  Learning Progress Summary             Patient Acceptance, E,TB, VU by  at 11/30/2023 0420    Acceptance, E,D, DU by PG at 11/7/2023 0932                         Point: Precautions (Done)       Description:   Instruct learner(s) on prescribed precautions during self-care and functional transfers.                  Learning Progress Summary             Patient Acceptance, E,TB, VU by  at 11/30/2023 0420     Acceptance, E,D, DU by PG at 11/7/2023 0932                         Point: Body mechanics (Done)       Description:   Instruct learner(s) on proper positioning and spine alignment during self-care, functional mobility activities and/or exercises.                  Learning Progress Summary             Patient Acceptance, E,TB, VU by  at 11/30/2023 0420    Acceptance, E,D, DU by PG at 11/7/2023 0932                                         User Key       Initials Effective Dates Name Provider Type Discipline     06/08/21 -  Bharath Berg RN Registered Nurse Nurse     06/16/21 -  Kodak Matos OT Occupational Therapist OT                  OT Recommendation and Plan  Planned Therapy Interventions (OT): activity tolerance training, BADL retraining, strengthening exercise, transfer/mobility retraining, patient/caregiver education/training, occupation/activity based interventions  Therapy Frequency (OT): 5 times/wk  Plan of Care Review  Plan of Care Reviewed With: patient  Progress: no change  Outcome Evaluation: Patient participated in upper body strengthening exercises utilizing 6 pound weights, 2 sets x 15 repetitions.  Patient stood mod assist x 2 and able to advance feet 3 inches with assist to advance right foot forward and 2 others to push bed.  Continue OT services per plan of care     Time Calculation:   Evaluation Complexity (OT)  Review Occupational Profile/Medical/Therapy History Complexity: brief/low complexity  Assessment, Occupational Performance/Identification of Deficit Complexity: 3-5 performance deficits  Clinical Decision Making Complexity (OT): problem focused assessment/low complexity  Overall Complexity of Evaluation (OT): low complexity     Time Calculation- OT       Row Name 12/18/23 1303 12/18/23 1020          Time Calculation- OT    OT Received On 12/18/23  -PG --     OT Goal Re-Cert Due Date 12/27/23  -PG --        Timed Charges    32889 - Gait Training Minutes  -- 5  -WM     74644 - OT  Therapeutic Activity Minutes 40  -PG --        Total Minutes    Timed Charges Total Minutes 40  -PG 5  -WM      Total Minutes 40  -PG 5  -WM               User Key  (r) = Recorded By, (t) = Taken By, (c) = Cosigned By      Initials Name Provider Type    Carson Johnson, CURTIS Physical Therapist Assistant    Kodak Velazco OT Occupational Therapist                  Therapy Charges for Today       Code Description Service Date Service Provider Modifiers Qty    16636999461 HC OT THERAPEUTIC ACT EA 15 MIN 12/18/2023 Kodak Matos OT GO 3                 Kodak Matos OT  12/18/2023

## 2023-12-18 NOTE — PLAN OF CARE
Goal Outcome Evaluation:  Plan of Care Reviewed With: patient        Progress: no change  Outcome Evaluation: Alert and oriented x4; able to call for assist as needed. Refuses turns even when encouraged. Uses trapeze bar to move around in bed. Baclofen and Percocet administered x1 each for complaints of left hip pain. No significant changes throughout shift. Call light within reach; care plan continues.

## 2023-12-18 NOTE — THERAPY TREATMENT NOTE
SNF - Physical Therapy Treatment Note  KE[facility] Dominguez     Patient Name: Jose Shaikh  : 1960  MRN: [id number]  Today's Date: 2023      Visit Dx:    ICD-10-CM ICD-9-CM   1. Difficulty in walking  R26.2 719.7     Patient Active Problem List   Diagnosis    Diabetic ulcer of left heel associated with type 2 DM    Acute osteomyelitis of left calcaneus     Diabetic ulcer of left heel associated with type 2 DM    Diabetic ulcer of right midfoot associated with type 2 DM    Paroxysmal atrial fibrillation    Essential hypertension    Hyperlipidemia LDL goal <100    Cellulitis and abscess of foot    High alkaline phosphatase level    Osteomyelitis    Onychomycosis    Onychocryptosis    Foot pain, bilateral    Osteomyelitis of foot, right, acute    Cellulitis of right foot    Type 2 diabetes mellitus, with long-term current use of insulin    Class 3 severe obesity due to excess calories with serious comorbidity and body mass index (BMI) of 45.0 to 49.9 in adult    Anxiety disorder, unspecified    Claustrophobia    Dependence on wheelchair    Depression, unspecified    Long term (current) use of anticoagulants    Long term (current) use of oral hypoglycemic drugs    Wound of foot    Non-prs chronic ulcer oth prt r foot limited to brkdwn skin    Orthostatic hypotension    Other chronic osteomyelitis, right ankle and foot    Personal history of nicotine dependence    Thrombocytopenia, unspecified    Unspecified open wound, right foot, initial encounter    Diabetic foot infection    Subacute osteomyelitis of right foot    Right foot pain    Sepsis    Onychomycosis    Foot pain, left    Impaired mobility and ADLs    Absence of toe of right foot    Corns and callosity    Disability of walking    Fracture    Limb swelling    Polyneuropathy    Pressure ulcer, stage 1    Shortness of breath    Generalized weakness    Debility     Past Medical History:   Diagnosis Date    Absence of toe of right foot     Acute osteomyelitis  of left calcaneus  08/18/2021    Anxiety and depression     Arthritis     Cancer     Claustrophobia     Corns and callus     Diabetic ulcer of left heel associated with type 2 DM 08/18/2021    Diabetic ulcer of left heel associated with type 2 DM 07/06/2021    Diabetic ulcer of right midfoot associated with type 2 DM 08/18/2021    Difficulty walking     Essential hypertension 08/31/2021    Hammertoe     Hyperlipidemia LDL goal <100 08/31/2021    Ingrown toenail     Obesity     Paroxysmal atrial fibrillation 08/31/2021    Polyneuropathy     Pressure ulcer, stage 1     Tinea unguium     Type 2 diabetes mellitus with polyneuropathy      Past Surgical History:   Procedure Laterality Date    CYST REMOVAL      center of back; benign    EYE SURGERY      INCISION AND DRAINAGE ABSCESS      back    INCISION AND DRAINAGE LEG Right 12/10/2021    Procedure: INCISION AND DRAINAGE LOWER EXTREMITY;  Surgeon: Ash Leyva DPM;  Location: [facility];  Service: Podiatry;  Laterality: Right;    OTHER SURGICAL HISTORY      Surgical clips left foot    TOE SURGERY Right     Removal of 5th toe    TRANS METATARSAL AMPUTATION Right 12/02/2021    Procedure: AMPUTATION TRANS METATARSAL;  Surgeon: Ash Leyva DPM;  Location: [facility];  Service: Podiatry;  Laterality: Right;    VASCULAR SURGERY      WRIST SURGERY Left     repair of injury       PT Assessment (last 12 hours)       PT Evaluation and Treatment       Row Name 12/18/23 1022          Physical Therapy Time and Intention    Subjective Information complains of;pain  -WM     Document Type therapy note (daily note)  -WM     Mode of Treatment individual therapy;physical therapy  -WM     Patient Effort good  -WM     Symptoms Noted During/After Treatment fatigue;increased pain  -WM       Row Name 12/18/23 1022          Pain    Pretreatment Pain Rating 6/10  -WM     Posttreatment Pain Rating 10/10  -WM     Pain Location - Side/Orientation Left  -WM     Pain  Location - hip;knee  -     Pain Intervention(s) Repositioned;Emotional support;Rest  -       Row Name 12/18/23 1022          Bed Mobility    Supine-Sit-Supine Carlin (Bed Mobility) moderate assist (50% patient effort);1 person assist;verbal cues  -     Bed Mobility, Safety Issues decreased use of legs for bridging/pushing  -     Assistive Device (Bed Mobility) bed rails;head of bed elevated;lift device;overhead trapeze  -       Row Name 12/18/23 1022          Sit-Stand Transfer    Sit-Stand Carlin (Transfers) moderate assist (50% patient effort);2 person assist;verbal cues  -     Assistive Device (Sit-Stand Transfers) bariatric;walker, front-wheeled  -       Row Name 12/18/23 1022          Stand-Sit Transfer    Stand-Sit Carlin (Transfers) moderate assist (50% patient effort);2 person assist;verbal cues  -     Assistive Device (Stand-Sit Transfers) bariatric;walker, front-wheeled  -WM       Row Name 12/18/23 1022          Gait/Stairs (Locomotion)    Carlin Level (Gait) moderate assist (50% patient effort);2 person assist;verbal cues  -     Assistive Device (Gait) bariatric equipment;walker, front-wheeled  -     Deviations/Abnormal Patterns (Gait) gait speed decreased;stride length decreased  -     Comment, (Gait/Stairs) Pt able to take two small steps with left knee immobilizer  -       Row Name 12/18/23 1022          Safety Issues, Functional Mobility    Impairments Affecting Function (Mobility) balance;endurance/activity tolerance;pain;range of motion (ROM);strength  -       Row Name 12/18/23 1022          Hip (Therapeutic Exercise)    Hip AAROM (Therapeutic Exercise) bilateral;aBduction;aDduction;supine;10 repetitions;2 sets  -     Hip Isometrics (Therapeutic Exercise) bilateral;gluteal sets;supine;10 repetitions;3 second hold;2 sets  -     Hip Strengthening (Therapeutic Exercise) bilateral;heel slides;supine;20 repititions  manual resistance  -       Row  Name 12/18/23 1022          Knee (Therapeutic Exercise)    Knee Isometrics (Therapeutic Exercise) bilateral;quad sets;supine;10 repetitions;3 second hold;2 sets  -     Knee Strengthening (Therapeutic Exercise) bilateral;SAQ (short arc quad);supine;4 lb free weight;20 repititions;SLR (straight leg raise)  4 lb wt used with SAQ only  -       Row Name 12/18/23 1022          Ankle (Therapeutic Exercise)    Ankle AROM (Therapeutic Exercise) bilateral;dorsiflexion;plantarflexion;supine;20 repititions  -       Row Name             Wound 11/07/23 1240 Right anterior foot    Wound - Properties Group Placement Date: 11/07/23  - Placement Time: 1240  -FH Side: Right  -FH Orientation: anterior  -FH Location: foot  -FH    Retired Wound - Properties Group Placement Date: 11/07/23  - Placement Time: 1240  -FH Side: Right  -FH Orientation: anterior  -FH Location: foot  -FH    Retired Wound - Properties Group Date first assessed: 11/07/23  - Time first assessed: 1240  -FH Side: Right  -FH Location: foot  -FH      Row Name 12/18/23 1022          Positioning and Restraints    Pre-Treatment Position in bed  -WM     Post Treatment Position bed  -WM     In Bed supine;call light within reach;encouraged to call for assist;with nsg  -       Row Name 12/18/23 1022          Progress Summary (PT)    Progress Toward Functional Goals (PT) progress toward functional goals is fair  -               User Key  (r) = Recorded By, (t) = Taken By, (c) = Cosigned By      Initials Name Provider Type    Carlos Anguiano RN Registered Nurse    Carson Johnson PTA Physical Therapist Assistant                  Section G              Section GG                       Physical Therapy Education       Title: PT OT SLP Therapies (Done)       Topic: Physical Therapy (Done)       Point: Mobility training (Done)       Learning Progress Summary             Patient Acceptance, E,TB, VU by RM at 11/30/2023 0420    Acceptance, E,TB, VU by MOE at  11/8/2023 1341                         Point: Precautions (Done)       Learning Progress Summary             Patient Acceptance, E,TB, VU by RM at 11/30/2023 0420    Acceptance, E,TB, VU by MOE at 11/8/2023 1341                                         User Key       Initials Effective Dates Name Provider Type Discipline     06/08/21 -  Bharath Berg, RN Registered Nurse Nurse    MOE 06/03/21 -  Merlin Rao PT Physical Therapist PT                  PT Recommendation and Plan     Progress Summary (PT)  Progress Toward Functional Goals (PT): progress toward functional goals is fair  Daily Progress Summary (PT): Pt declined transfers today secondary to using bed pad numerous times.   Outcome Measures       Row Name 12/18/23 1027 12/16/23 1700 12/15/23 1342       How much help from another person do you currently need...    Turning from your back to your side while in flat bed without using bedrails? 3  -WM 3  -CS 3  -WM    Moving from lying on back to sitting on the side of a flat bed without bedrails? 2  -WM 2  -CS 2  -WM    Moving to and from a bed to a chair (including a wheelchair)? 1  -WM 1  -CS 1  -WM    Standing up from a chair using your arms (e.g., wheelchair, bedside chair)? 2  -WM 2  -CS 2  -WM    Climbing 3-5 steps with a railing? 1  -WM 1  -CS 1  -WM    To walk in hospital room? 1  -WM 2  -CS 2  -WM    AM-PAC 6 Clicks Score (PT) 10  -WM 11  -CS 11  -WM    Highest Level of Mobility Goal 4 --> Transfer to chair/commode  -WM 4 --> Transfer to chair/commode  -CS 4 --> Transfer to chair/commode  -WM       Functional Assessment    Outcome Measure Options -- AM-PAC 6 Clicks Basic Mobility (PT)  -CS --              User Key  (r) = Recorded By, (t) = Taken By, (c) = Cosigned By      Initials Name Provider Type    WM Carson Inman, CURTIS Physical Therapist Assistant    Ronald Huerta PTA Physical Therapist Assistant                     Time Calculation:    PT Charges       Row Name 12/18/23 1020              Time Calculation    PT Received On 12/18/23  -         Timed Charges    90258 - PT Therapeutic Exercise Minutes 16  -WM      84727 - Gait Training Minutes  5  -WM      36570 - PT Therapeutic Activity Minutes 38  -WM         SNF Physical Therapy Minutes    Skilled Minutes- PT 59 min  -WM         Total Minutes    Timed Charges Total Minutes 59  -WM       Total Minutes 59  -WM                User Key  (r) = Recorded By, (t) = Taken By, (c) = Cosigned By      Initials Name Provider Type    Carson Johnson PTA Physical Therapist Assistant                      PT G-Codes  Outcome Measure Options: AM-PAC 6 Clicks Basic Mobility (PT)  AM-PAC 6 Clicks Score (PT): 10  AM-PAC 6 Clicks Score (OT): 14    Carson Inman PTA  12/18/2023

## 2023-12-19 LAB
GLUCOSE BLDC GLUCOMTR-MCNC: 147 MG/DL (ref 70–99)
GLUCOSE BLDC GLUCOMTR-MCNC: 149 MG/DL (ref 70–99)
GLUCOSE BLDC GLUCOMTR-MCNC: 157 MG/DL (ref 70–99)
GLUCOSE BLDC GLUCOMTR-MCNC: 88 MG/DL (ref 70–99)

## 2023-12-19 PROCEDURE — 97116 GAIT TRAINING THERAPY: CPT

## 2023-12-19 PROCEDURE — 82948 REAGENT STRIP/BLOOD GLUCOSE: CPT

## 2023-12-19 PROCEDURE — 63710000001 INSULIN DETEMIR PER 5 UNITS: Performed by: PHYSICIAN ASSISTANT

## 2023-12-19 PROCEDURE — 97530 THERAPEUTIC ACTIVITIES: CPT

## 2023-12-19 PROCEDURE — 97110 THERAPEUTIC EXERCISES: CPT

## 2023-12-19 PROCEDURE — 63710000001 INSULIN LISPRO (HUMAN) PER 5 UNITS: Performed by: INTERNAL MEDICINE

## 2023-12-19 PROCEDURE — 99308 SBSQ NF CARE LOW MDM 20: CPT | Performed by: PHYSICIAN ASSISTANT

## 2023-12-19 RX ADMIN — ATORVASTATIN CALCIUM 10 MG: 10 TABLET, FILM COATED ORAL at 20:06

## 2023-12-19 RX ADMIN — HYDROCORTISONE 1 APPLICATION: 1 OINTMENT TOPICAL at 16:24

## 2023-12-19 RX ADMIN — CYANOCOBALAMIN TAB 500 MCG 1000 MCG: 500 TAB at 08:37

## 2023-12-19 RX ADMIN — PREGABALIN 100 MG: 100 CAPSULE ORAL at 20:06

## 2023-12-19 RX ADMIN — Medication 250 MG: at 20:06

## 2023-12-19 RX ADMIN — FERROUS SULFATE TAB 325 MG (65 MG ELEMENTAL FE) 325 MG: 325 (65 FE) TAB at 07:52

## 2023-12-19 RX ADMIN — INSULIN LISPRO 4 UNITS: 100 INJECTION, SOLUTION INTRAVENOUS; SUBCUTANEOUS at 20:06

## 2023-12-19 RX ADMIN — Medication 10 MG: at 20:06

## 2023-12-19 RX ADMIN — INSULIN DETEMIR 70 UNITS: 100 INJECTION, SOLUTION SUBCUTANEOUS at 08:37

## 2023-12-19 RX ADMIN — INSULIN DETEMIR 60 UNITS: 100 INJECTION, SOLUTION SUBCUTANEOUS at 20:06

## 2023-12-19 RX ADMIN — PREGABALIN 100 MG: 100 CAPSULE ORAL at 16:24

## 2023-12-19 RX ADMIN — HYDROCORTISONE 1 APPLICATION: 1 OINTMENT TOPICAL at 20:07

## 2023-12-19 RX ADMIN — EMPAGLIFLOZIN 10 MG: 10 TABLET, FILM COATED ORAL at 08:37

## 2023-12-19 RX ADMIN — HYDROCORTISONE 1 APPLICATION: 1 OINTMENT TOPICAL at 08:37

## 2023-12-19 RX ADMIN — LISINOPRIL 2.5 MG: 2.5 TABLET ORAL at 08:37

## 2023-12-19 RX ADMIN — PREGABALIN 100 MG: 100 CAPSULE ORAL at 08:37

## 2023-12-19 RX ADMIN — OXYCODONE AND ACETAMINOPHEN 1 TABLET: 7.5; 325 TABLET ORAL at 04:26

## 2023-12-19 RX ADMIN — APIXABAN 5 MG: 5 TABLET, FILM COATED ORAL at 08:37

## 2023-12-19 RX ADMIN — APIXABAN 5 MG: 5 TABLET, FILM COATED ORAL at 20:06

## 2023-12-19 RX ADMIN — OXYCODONE AND ACETAMINOPHEN 1 TABLET: 7.5; 325 TABLET ORAL at 11:00

## 2023-12-19 RX ADMIN — BACLOFEN 10 MG: 10 TABLET ORAL at 22:34

## 2023-12-19 RX ADMIN — BACLOFEN 10 MG: 10 TABLET ORAL at 13:29

## 2023-12-19 RX ADMIN — OXYCODONE AND ACETAMINOPHEN 1 TABLET: 7.5; 325 TABLET ORAL at 19:06

## 2023-12-19 RX ADMIN — SERTRALINE HYDROCHLORIDE 25 MG: 25 TABLET ORAL at 20:06

## 2023-12-19 RX ADMIN — Medication 250 MG: at 08:37

## 2023-12-19 NOTE — PLAN OF CARE
Goal Outcome Evaluation:   Patient alert and oriented.  Bedpan used for multiple small bowel movements and urinal emptied as needed.  Mediated for pain twice, see MAR.  Able to make needs known.  Call light within reach.  Continue plan of care.

## 2023-12-19 NOTE — PROGRESS NOTES
[facility]   Hospitalist Progress Note  Date: 2023  Patient Name: Jose Shaikh  : 1960  MRN: [id number]  Date of admission: 2023      Subjective   Subjective     Chief Complaint: Weakness    Summary: Jose Shaikh is a 63 y.o. male  initially hospitalized on 9/10/2023, prolonged hospitalization for treatment of management of generalized weakness, deconditioning, with acute issues of diarrhea, C. difficile negative.  Diarrhea improved.  Had small hematoma after ambulating on his foot.  Unfortunately with exhaustive efforts to try to place this gentleman after 39 days of hospitalization, we are unable to find a facility that will accept him.  He has no further acute needs or requirements for inpatient monitoring and management, his labs and vitals are stable, he is tolerating oral intake, and has been refusing turns and repositionings and other interventions with nursing staff despite recommendations.  Patient discharged in hemodynamically stable condition on 10/19/2023 to follow-up with PCP within 1 week. Unfortunately we cannot solve all of his social issues during this hospitalization due to lack of resources and participation from the patient's perspective despite all our efforts.  Patient has a financial means of making arrangements at home.  Patient appealed his discharge.  Lost his appeal.  LCD: 10/20/23, PFL beginning: 10/21/23 @ 12pm.  Due to an inability to place the patient in a.m. nursing home he has been placed in our skilled nursing facility until arrangements can be made or until he is able to care for himself.      Interval Followup: 2023    Took 3 steps with therapist  No new complaints or issues  Glucose reasonably controlled  Hemoglobin 9.5  Normotensive on room air  Afebrile        Review of systems: All systems reviewed negative septa following: Patient complains of generalized weakness fatigue and diarrhea  Objective   Objective     Vitals:   Temp:  [97.5 °F (36.4  °C)-97.6 °F (36.4 °C)] 97.6 °F (36.4 °C)  Heart Rate:  [74-78] 74  Resp:  [14-18] 18  BP: (122-130)/(62) 130/62    Physical Exam   Constitutional: Awake alert oriented no acute distress  Respiratory: No wheeze  GI: Abdomen: Obese.   CV: RRR.  No murmur.  No edema.  Chronic venous stasis changes lower ext.  Neuro/psych:  Calm mood.  No dysarthria.    Result Review    Result Review:  I have personally reviewed the results from the time of this admission to 12/19/2023 12:30 EST and agree with these findings:  []  Laboratory  []  Microbiology  []  Radiology  []  EKG/Telemetry   []  Cardiology/Vascular   []  Pathology  []  Old records  []  Other:    Assessment & Plan   Assessment / Plan   Assessment:  Hospital-acquired weakness  C. difficile diarrhea  Generalized weakness  Deconditioning  DM (Kami Garcia and PCP)  HTN  PAF (on Eliquis; previously seen Dr. Duval)  Morbid obesity with BMI 44  Debility with wheelchair dependence  Hx of severe left hip pain  Severe degenerative joint disease of lower extremities  Hx L calcaneus osteomyelitis  Hx R transmetatarsal  Chronic bilateral foot wounds with right transmetatarsal amputation, healing by secondary intention (wound care clinic; podiatrist Gordon)  Thrombocytopenia, resolved      Plan:    Glucose ranging 113-152-219   Continue Levemir 70 a.m., 60 p.m.   Continue sliding scale insulin protocol  Continue Eliquis for stroke prophylaxis  Continue daily PT and OT  Continue current pain meds   Completed course of oral vancomycin for C difficile associated diarrhea.    Continue probiotics  Monitor hemodynamics and avoid hypotension/dehydration.  Continue other treatment as ordered  Discussed with RN    DVT prophylaxis:  Medical DVT prophylaxis orders are present.    CODE STATUS:   Code Status (Patient has no pulse and is not breathing): CPR (Attempt to Resuscitate)  Medical Interventions (Patient has pulse or is breathing): Full Support

## 2023-12-19 NOTE — PLAN OF CARE
Goal Outcome Evaluation:   Alert and oriented and pleasant with staff. X2 Max assist for transfers and ambulation. X2 admin PRN pain medication, with relief of symptoms noted this shift. Showed improved endurance and mobility with PT this shift, met set step goals this shift. Sitting up in bed, call light in reach.

## 2023-12-19 NOTE — THERAPY EVALUATION
Acute Care - Physical Therapy Initial Evaluation  [facility]     Patient Name: Jose Shaikh  : 1960  MRN: [id number]  Today's Date: 2023     Admit date: 2023     Referring Physician: Max Mehta MD     Surgery Date:* No surgery found *             Visit Dx:     ICD-10-CM ICD-9-CM   1. Difficulty in walking  R26.2 719.7     Patient Active Problem List   Diagnosis    Diabetic ulcer of left heel associated with type 2 DM    Acute osteomyelitis of left calcaneus     Diabetic ulcer of left heel associated with type 2 DM    Diabetic ulcer of right midfoot associated with type 2 DM    Paroxysmal atrial fibrillation    Essential hypertension    Hyperlipidemia LDL goal <100    Cellulitis and abscess of foot    High alkaline phosphatase level    Osteomyelitis    Onychomycosis    Onychocryptosis    Foot pain, bilateral    Osteomyelitis of foot, right, acute    Cellulitis of right foot    Type 2 diabetes mellitus, with long-term current use of insulin    Class 3 severe obesity due to excess calories with serious comorbidity and body mass index (BMI) of 45.0 to 49.9 in adult    Anxiety disorder, unspecified    Claustrophobia    Dependence on wheelchair    Depression, unspecified    Long term (current) use of anticoagulants    Long term (current) use of oral hypoglycemic drugs    Wound of foot    Non-prs chronic ulcer oth prt r foot limited to brkdwn skin    Orthostatic hypotension    Other chronic osteomyelitis, right ankle and foot    Personal history of nicotine dependence    Thrombocytopenia, unspecified    Unspecified open wound, right foot, initial encounter    Diabetic foot infection    Subacute osteomyelitis of right foot    Right foot pain    Sepsis    Onychomycosis    Foot pain, left    Impaired mobility and ADLs    Absence of toe of right foot    Corns and callosity    Disability of walking    Fracture    Limb swelling    Polyneuropathy    Pressure ulcer, stage 1    Shortness of breath     Generalized weakness    Debility     Past Medical History:   Diagnosis Date    Absence of toe of right foot     Acute osteomyelitis of left calcaneus  08/18/2021    Anxiety and depression     Arthritis     Cancer     Claustrophobia     Corns and callus     Diabetic ulcer of left heel associated with type 2 DM 08/18/2021    Diabetic ulcer of left heel associated with type 2 DM 07/06/2021    Diabetic ulcer of right midfoot associated with type 2 DM 08/18/2021    Difficulty walking     Essential hypertension 08/31/2021    Hammertoe     Hyperlipidemia LDL goal <100 08/31/2021    Ingrown toenail     Obesity     Paroxysmal atrial fibrillation 08/31/2021    Polyneuropathy     Pressure ulcer, stage 1     Tinea unguium     Type 2 diabetes mellitus with polyneuropathy      Past Surgical History:   Procedure Laterality Date    CYST REMOVAL      center of back; benign    EYE SURGERY      INCISION AND DRAINAGE ABSCESS      back    INCISION AND DRAINAGE LEG Right 12/10/2021    Procedure: INCISION AND DRAINAGE LOWER EXTREMITY;  Surgeon: Ash Leyva DPM;  Location: [facility] OR;  Service: Podiatry;  Laterality: Right;    OTHER SURGICAL HISTORY      Surgical clips left foot    TOE SURGERY Right     Removal of 5th toe    TRANS METATARSAL AMPUTATION Right 12/02/2021    Procedure: AMPUTATION TRANS METATARSAL;  Surgeon: Ash Leyva DPM;  Location: [facility] OR;  Service: Podiatry;  Laterality: Right;    VASCULAR SURGERY      WRIST SURGERY Left     repair of injury     PT Assessment (last 12 hours)       PT Evaluation and Treatment       Row Name 12/19/23 1000          Physical Therapy Time and Intention    Subjective Information no complaints  -MOE     Document Type therapy note (daily note)  -MOE     Mode of Treatment individual therapy;physical therapy  -MOE     Patient Effort good  -MOE       Row Name 12/19/23 1000          General Information    Patient Observations alert;cooperative;agree to therapy  -MOE        Row Name 12/19/23 1000          Bed Mobility    Bed Mobility bed mobility (all) activities;supine-sit  -MOE     All Activities, [place] (Bed Mobility) standby assist  -MOE     Supine-Sit [place] (Bed Mobility) minimum assist (75% patient effort)  -MOE     Sit-Supine [place] (Bed Mobility) moderate assist (50% patient effort)  -MOE       Row Name 12/19/23 1000          Transfers    Transfers sit-stand transfer  -MOE       Row Name 12/19/23 1000          Sit-Stand Transfer    Sit-Stand [place] (Transfers) moderate assist (50% patient effort)  3 stands for ambulation at bedside  -MOE     Assistive Device (Sit-Stand Transfers) walker, front-wheeled  -MOE       Row Name 12/19/23 1000          Gait/Stairs (Locomotion)    Gait/Stairs Locomotion gait/ambulation assistive device  -MOE     [place] Level (Gait) minimum assist (75% patient effort);2 person assist  -MOE     Assistive Device (Gait) bariatric equipment;walker, front-wheeled  -MOE     Distance in Feet (Gait) 1 ft x 3  -MOE       Row Name 12/19/23 1000          Balance    Dynamic Standing Balance minimal assist  -MOE     Position/Device Used, Standing Balance walker, front-wheeled  -MOE       Row Name             Wound 11/07/23 1240 Right anterior foot    Wound - Properties Group Placement Date: 11/07/23  - Placement Time: 1240  -FH Side: Right  -FH Orientation: anterior  -FH Location: foot  -FH    Retired Wound - Properties Group Placement Date: 11/07/23  - Placement Time: 1240  -FH Side: Right  -FH Orientation: anterior  -FH Location: foot  -FH    Retired Wound - Properties Group Date first assessed: 11/07/23  - Time first assessed: 1240  -FH Side: Right  -FH Location: foot  -FH      Row Name 12/19/23 1000          Progress Summary (PT)    Progress Toward Functional Goals (PT) progress toward functional goals is gradual  -MOE               User Key  (r) = Recorded By, (t) = Taken By, (c) = Cosigned By      Initials Name Provider Type      Carlos Cagle, RN Registered Nurse    Merlin De La O PT Physical Therapist                    Physical Therapy Education       Title: PT OT SLP Therapies (Done)       Topic: Physical Therapy (Done)       Point: Mobility training (Done)       Learning Progress Summary             Patient Acceptance, E, VU by CR at 12/19/2023 1004    Acceptance, E,TB, VU by RM at 11/30/2023 0420    Acceptance, E,TB, VU by MOE at 11/8/2023 1341                         Point: Precautions (Done)       Learning Progress Summary             Patient Acceptance, E,TB, VU by RM at 11/30/2023 0420    Acceptance, E,TB, VU by MOE at 11/8/2023 1341                                         User Key       Initials Effective Dates Name Provider Type Discipline    CINDY 06/08/21 -  Bharath Berg RN Registered Nurse Nurse    ROLY 06/13/22 -  Adilson Baker LPN Licensed Nurse Nurse    MOE 06/03/21 -  Merlin Rao, PT Physical Therapist PT                  PT Recommendation and Plan  Anticipated Discharge Disposition (PT): home with home health  Planned Therapy Interventions (PT): bed mobility training, balance training, gait training, joint mobilization, home exercise program, stair training, strengthening, transfer training  Therapy Frequency (PT): 6 times/wk  Progress Summary (PT)  Progress Toward Functional Goals (PT): progress toward functional goals is gradual  Daily Progress Summary (PT): Pt has made little to no progress toward his goals.  He is still very weak and will require skilled PT services to address his mobility issues but he will need to show more effort and progress druing this reevaluation period to continue to be appropriate.  Will continue current plan another 30 day period.  Pt was instructed that if no progress is made that we will have no choice but to d/c services due to lack of progress.  Plan of Care Reviewed With: patient  Outcome Evaluation: Patient presents with decreased strength, transfers and ambulation.   Skilled physical therapy services will be required to address these mobility deficits.   Outcome Measures       Row Name 12/18/23 1027 12/16/23 1700          How much help from another person do you currently need...    Turning from your back to your side while in flat bed without using bedrails? 3  -WM 3  -CS     Moving from lying on back to sitting on the side of a flat bed without bedrails? 2  -WM 2  -CS     Moving to and from a bed to a chair (including a wheelchair)? 1  -WM 1  -CS     Standing up from a chair using your arms (e.g., wheelchair, bedside chair)? 2  -WM 2  -CS     Climbing 3-5 steps with a railing? 1  -WM 1  -CS     To walk in hospital room? 1  -WM 2  -CS     AM-PAC 6 Clicks Score (PT) 10  -WM 11  -CS     Highest Level of Mobility Goal 4 --> Transfer to chair/commode  -WM 4 --> Transfer to chair/commode  -CS        Functional Assessment    Outcome Measure Options -- AM-PAC 6 Clicks Basic Mobility (PT)  -CS               User Key  (r) = Recorded By, (t) = Taken By, (c) = Cosigned By      Initials Name Provider Type    WM Carson Inman, CURTIS Physical Therapist Assistant    Ronald Huerta PTA Physical Therapist Assistant                     Time Calculation:    PT Charges       Row Name 12/19/23 1043             Time Calculation    PT Received On 12/19/23  -MOE         Timed Charges    07065 - Gait Training Minutes  10  -MOE      07343 - PT Therapeutic Activity Minutes 8  -MOE         Total Minutes    Timed Charges Total Minutes 18  -MOE       Total Minutes 18  -MOE                User Key  (r) = Recorded By, (t) = Taken By, (c) = Cosigned By      Initials Name Provider Type    MOE Merlin Rao, PT Physical Therapist                  Therapy Charges for Today       Code Description Service Date Service Provider Modifiers Qty    56307117459 HC GAIT TRAINING EA 15 MIN 12/19/2023 Merlin Rao, PT GP 1            PT G-Codes  Outcome Measure Options: AM-PAC 6 Clicks Daily Activity (OT), Optimal  Instrument  AM-PAC 6 Clicks Score (PT): 10  AM-PAC 6 Clicks Score (OT): 14    Merlin Rao, PT  12/19/2023

## 2023-12-19 NOTE — THERAPY TREATMENT NOTE
SNF - Occupational Therapy Treatment Note  [facility]    Patient Name: Jose Shaikh  : 1960    MRN: [id number]                              Today's Date: 2023       Admit Date: 2023    Visit Dx:     ICD-10-CM ICD-9-CM   1. Difficulty in walking  R26.2 719.7     Patient Active Problem List   Diagnosis    Diabetic ulcer of left heel associated with type 2 DM    Acute osteomyelitis of left calcaneus     Diabetic ulcer of left heel associated with type 2 DM    Diabetic ulcer of right midfoot associated with type 2 DM    Paroxysmal atrial fibrillation    Essential hypertension    Hyperlipidemia LDL goal <100    Cellulitis and abscess of foot    High alkaline phosphatase level    Osteomyelitis    Onychomycosis    Onychocryptosis    Foot pain, bilateral    Osteomyelitis of foot, right, acute    Cellulitis of right foot    Type 2 diabetes mellitus, with long-term current use of insulin    Class 3 severe obesity due to excess calories with serious comorbidity and body mass index (BMI) of 45.0 to 49.9 in adult    Anxiety disorder, unspecified    Claustrophobia    Dependence on wheelchair    Depression, unspecified    Long term (current) use of anticoagulants    Long term (current) use of oral hypoglycemic drugs    Wound of foot    Non-prs chronic ulcer oth prt r foot limited to brkdwn skin    Orthostatic hypotension    Other chronic osteomyelitis, right ankle and foot    Personal history of nicotine dependence    Thrombocytopenia, unspecified    Unspecified open wound, right foot, initial encounter    Diabetic foot infection    Subacute osteomyelitis of right foot    Right foot pain    Sepsis    Onychomycosis    Foot pain, left    Impaired mobility and ADLs    Absence of toe of right foot    Corns and callosity    Disability of walking    Fracture    Limb swelling    Polyneuropathy    Pressure ulcer, stage 1    Shortness of breath    Generalized weakness    Debility     Past Medical History:   Diagnosis  Date    Absence of toe of right foot     Acute osteomyelitis of left calcaneus  08/18/2021    Anxiety and depression     Arthritis     Cancer     Claustrophobia     Corns and callus     Diabetic ulcer of left heel associated with type 2 DM 08/18/2021    Diabetic ulcer of left heel associated with type 2 DM 07/06/2021    Diabetic ulcer of right midfoot associated with type 2 DM 08/18/2021    Difficulty walking     Essential hypertension 08/31/2021    Hammertoe     Hyperlipidemia LDL goal <100 08/31/2021    Ingrown toenail     Obesity     Paroxysmal atrial fibrillation 08/31/2021    Polyneuropathy     Pressure ulcer, stage 1     Tinea unguium     Type 2 diabetes mellitus with polyneuropathy      Past Surgical History:   Procedure Laterality Date    CYST REMOVAL      center of back; benign    EYE SURGERY      INCISION AND DRAINAGE ABSCESS      back    INCISION AND DRAINAGE LEG Right 12/10/2021    Procedure: INCISION AND DRAINAGE LOWER EXTREMITY;  Surgeon: Ash Leyva DPM;  [facility];  Service: Podiatry;  Laterality: Right;    OTHER SURGICAL HISTORY      Surgical clips left foot    TOE SURGERY Right     Removal of 5th toe    TRANS METATARSAL AMPUTATION Right 12/02/2021    Procedure: AMPUTATION TRANS METATARSAL;  Surgeon: Ash Leyva DPM;  Location: [facility] OR;  Service: Podiatry;  Laterality: Right;    VASCULAR SURGERY      WRIST SURGERY Left     repair of injury      General Information       Row Name 12/19/23 2194          OT Time and Intention    Document Type therapy note (daily note)  -PG     Mode of Treatment individual therapy;occupational therapy  -PG               User Key  (r) = Recorded By, (t) = Taken By, (c) = Cosigned By      Initials Name Provider Type    PG Kodak Matos OT Occupational Therapist                     Mobility/ADL's       Row Name 12/19/23 0334          Sit-Stand Transfer    Sit-Stand [place] (Transfers) moderate assist (50% patient effort)   -PG     Assistive Device (Sit-Stand Transfers) walker, front-wheeled  -PG       Row Name 12/19/23 1056          Stand-Sit Transfer    Stand-Sit [place] (Transfers) moderate assist (50% patient effort);2 person assist;verbal cues  -PG               User Key  (r) = Recorded By, (t) = Taken By, (c) = Cosigned By      Initials Name Provider Type    Kodak Velazco OT Occupational Therapist                   Obj/Interventions       Row Name 12/19/23 1057          Shoulder (Therapeutic Exercise)    Shoulder (Therapeutic Exercise) strengthening exercise  -PG     Shoulder Strengthening (Therapeutic Exercise) 5 lb free weight;20 repititions  -PG       Row Name 12/19/23 1057          Elbow/Forearm (Therapeutic Exercise)    Elbow/Forearm (Therapeutic Exercise) strengthening exercise  -PG     Elbow/Forearm Strengthening (Therapeutic Exercise) 5 lb free weight;20 repititions  -PG       Row Name 12/19/23 1057          Motor Skills    Therapeutic Exercise shoulder;elbow/forearm  -PG               User Key  (r) = Recorded By, (t) = Taken By, (c) = Cosigned By      Initials Name Provider Type    PG Kodak Matos OT Occupational Therapist                   Goals/Plan    No documentation.                  Clinical Impression       Row Name 12/19/23 1057          Plan of Care Review    Progress no change  -PG               User Key  (r) = Recorded By, (t) = Taken By, (c) = Cosigned By      Initials Name Provider Type    Kodak Velazco OT Occupational Therapist                   Outcome Measures       Row Name 12/19/23 1057          How much help from another is currently needed...    Putting on and taking off regular lower body clothing? 1  -PG     Bathing (including washing, rinsing, and drying) 2  -PG     Toileting (which includes using toilet bed pan or urinal) 1  -PG     Putting on and taking off regular upper body clothing 3  -PG     Taking care of personal grooming (such as brushing teeth) 3  -PG     Eating meals 3   -PG     AM-PAC 6 Clicks Score (OT) 13  -PG       Row Name 12/19/23 1000          How much help from another person do you currently need...    Turning from your back to your side while in flat bed without using bedrails? 3  -CR     Moving from lying on back to sitting on the side of a flat bed without bedrails? 2  -CR     Moving to and from a bed to a chair (including a wheelchair)? 1  -CR     Standing up from a chair using your arms (e.g., wheelchair, bedside chair)? 2  -CR     Climbing 3-5 steps with a railing? 1  -CR     To walk in hospital room? 1  -CR     AM-PAC 6 Clicks Score (PT) 10  -CR     Highest Level of Mobility Goal 4 --> Transfer to chair/commode  -CR       Row Name 12/19/23 1057          Functional Assessment    Outcome Measure Options AM-PAC 6 Clicks Daily Activity (OT);Optimal Instrument  -PG       Row Name 12/19/23 1057          Optimal Instrument    Optimal Instrument Optimal - 3  -PG     Bending/Stooping 4  -PG     Standing 3  -PG     Reaching 2  -PG               User Key  (r) = Recorded By, (t) = Taken By, (c) = Cosigned By      Initials Name Provider Type    CR Adilson Baker LPN Licensed Nurse    PG Kodak Matos, BEKAH Occupational Therapist                  Section G              Section GG  SectionGG: Functional Ability/Goals, Adm  Self Care, Prior Functioning (NK5121O): 1. Dependent  Upper Extremity Range of Motion (KU7139X): No impairment  Self Care, Admission (Section GG)  Eating: Self-Care Admission Performance (BN1461F7): independent (06)  Oral Hygiene: Self-Care Admission Performance (TQ5879T5): independent (06)  Toileting Hygiene: Self-Care Admission Performance (HC4006F5): dependent (01)  Shower/Bathe Self: Self-Care Admission Performance (GN4364G1): substantial/maximal assistance (02)  Upper Body Dressing: Self-Care Admission Performance (DO7175W7): partial/moderate assistance (03)  Lower Body Dressing: Self-Care Admission Performance (DV0911P8): dependent (01)  Putting On/Taking Off  Footwear: Self-Care Admission Performance (BJ4896O9): dependent (01)  Personal Hygiene: Self-Care Admission Performance (UM4496T1): dependent (01)  Mobility, Admission Performance (UG4620)  Toilet Transfer: Mobility Admission Performance (MH6860Z5): not attempted, medical condition/safety concern (88)  Section GG: Functional Ability/Goals, DC  Eating: Self-Care Discharge Goal (HD6997C4): independent (06)  Oral Hygiene: Self-Care Discharge Goal (RH4234H4): independent (06)  Shower/Bathe Self: Self-Care Discharge Goal (KZ1148J5): supervision or touching assistance (04)  Upper Body Dressing: Self-Care Discharge Goal (CT2877P8): setup or clean-up assistance (05)  Lower Body Dressing: Self-Care Discharge Goal (XG2484B4): supervision or touching assistance (04)  Putting On/Taking Off Footwear: Self-Care Discharge Goal (WN3441C7): supervision or touching assistance (04)  Personal Hygiene: Self-Care Discharge Goal (EN2394J5): supervision or touching assistance (04)  Mobility (GG), Discharge Goal (UL5566)  Toilet Transfer: Mobility Discharge Goal (HM1175H9): supervision or touching assistance (04)          Occupational Therapy Education       Title: PT OT SLP Therapies (Done)       Topic: Occupational Therapy (Done)       Point: ADL training (Done)       Description:   Instruct learner(s) on proper safety adaptation and remediation techniques during self care or transfers.   Instruct in proper use of assistive devices.                  Learning Progress Summary             Patient Acceptance, E,TB, VU by  at 11/30/2023 0420    Acceptance, E,D, DU by PG at 11/7/2023 0932                         Point: Home exercise program (Done)       Description:   Instruct learner(s) on appropriate technique for monitoring, assisting and/or progressing therapeutic exercises/activities.                  Learning Progress Summary             Patient Acceptance, E,TB, VU by  at 11/30/2023 0420    Acceptance, E,D, DU by PG at 11/7/2023  0932                         Point: Precautions (Done)       Description:   Instruct learner(s) on prescribed precautions during self-care and functional transfers.                  Learning Progress Summary             Patient Acceptance, E,TB, VU by  at 11/30/2023 0420    Acceptance, E,D, DU by PG at 11/7/2023 0932                         Point: Body mechanics (Done)       Description:   Instruct learner(s) on proper positioning and spine alignment during self-care, functional mobility activities and/or exercises.                  Learning Progress Summary             Patient Acceptance, E,TB, VU by  at 11/30/2023 0420    Acceptance, E,D, DU by PG at 11/7/2023 0932                                         User Key       Initials Effective Dates Name Provider Type Discipline     06/08/21 -  Bharath Berg RN Registered Nurse Nurse     06/16/21 -  Kodak Matos OT Occupational Therapist OT                  OT Recommendation and Plan  Planned Therapy Interventions (OT): activity tolerance training, BADL retraining, strengthening exercise, transfer/mobility retraining, patient/caregiver education/training, occupation/activity based interventions  Therapy Frequency (OT): 5 times/wk  Plan of Care Review  Plan of Care Reviewed With: patient  Progress: no change  Outcome Evaluation: Patient participated in upper body strengthening exercises utilizing 6 pound weights, 2 sets x 15 repetitions.  Patient stood mod assist x 2 and able to advance feet 3 inches with assist to advance right foot forward and 2 others to push bed.  Continue OT services per plan of care     Time Calculation:   Evaluation Complexity (OT)  Review Occupational Profile/Medical/Therapy History Complexity: brief/low complexity  Assessment, Occupational Performance/Identification of Deficit Complexity: 3-5 performance deficits  Clinical Decision Making Complexity (OT): problem focused assessment/low complexity  Overall Complexity of Evaluation  (OT): low complexity     Time Calculation- OT       Row Name 12/19/23 1058 12/19/23 1043          Time Calculation- OT    OT Received On 12/19/23  -PG --     OT Goal Re-Cert Due Date 12/27/23  -PG --        Timed Charges    37746 - OT Therapeutic Exercise Minutes 15  -PG --     53952 - Gait Training Minutes  -- 10  -MOE     24809 - OT Therapeutic Activity Minutes 25  -PG --        SNF Occupational Therapy Minutes    Skilled Minutes- OT 40 min  -PG --        Total Minutes    Timed Charges Total Minutes 40  -PG 10  -MOE      Total Minutes 40  -PG 10  -MOE               User Key  (r) = Recorded By, (t) = Taken By, (c) = Cosigned By      Initials Name Provider Type    PG Kodak Matos OT Occupational Therapist    Merlin De La O, PT Physical Therapist                  Therapy Charges for Today       Code Description Service Date Service Provider Modifiers Qty    22136051020 HC OT THERAPEUTIC ACT EA 15 MIN 12/18/2023 Kodak Matos OT GO 3    44850328759 HC OT THER PROC EA 15 MIN 12/19/2023 Kodak Matos OT GO 1    72001123988 HC OT THERAPEUTIC ACT EA 15 MIN 12/19/2023 Kodak Matos OT GO 2                 Kodak Matos OT  12/19/2023

## 2023-12-20 LAB
GLUCOSE BLDC GLUCOMTR-MCNC: 127 MG/DL (ref 70–99)
GLUCOSE BLDC GLUCOMTR-MCNC: 132 MG/DL (ref 70–99)
GLUCOSE BLDC GLUCOMTR-MCNC: 170 MG/DL (ref 70–99)
GLUCOSE BLDC GLUCOMTR-MCNC: 182 MG/DL (ref 70–99)

## 2023-12-20 PROCEDURE — 97110 THERAPEUTIC EXERCISES: CPT

## 2023-12-20 PROCEDURE — 63710000001 INSULIN DETEMIR PER 5 UNITS: Performed by: PHYSICIAN ASSISTANT

## 2023-12-20 PROCEDURE — 97530 THERAPEUTIC ACTIVITIES: CPT

## 2023-12-20 PROCEDURE — 82948 REAGENT STRIP/BLOOD GLUCOSE: CPT

## 2023-12-20 PROCEDURE — 63710000001 INSULIN LISPRO (HUMAN) PER 5 UNITS: Performed by: INTERNAL MEDICINE

## 2023-12-20 PROCEDURE — 97116 GAIT TRAINING THERAPY: CPT

## 2023-12-20 RX ADMIN — INSULIN DETEMIR 70 UNITS: 100 INJECTION, SOLUTION SUBCUTANEOUS at 09:29

## 2023-12-20 RX ADMIN — Medication 250 MG: at 09:29

## 2023-12-20 RX ADMIN — EMPAGLIFLOZIN 10 MG: 10 TABLET, FILM COATED ORAL at 09:29

## 2023-12-20 RX ADMIN — APIXABAN 5 MG: 5 TABLET, FILM COATED ORAL at 20:07

## 2023-12-20 RX ADMIN — INSULIN DETEMIR 60 UNITS: 100 INJECTION, SOLUTION SUBCUTANEOUS at 20:08

## 2023-12-20 RX ADMIN — OXYCODONE AND ACETAMINOPHEN 1 TABLET: 7.5; 325 TABLET ORAL at 09:59

## 2023-12-20 RX ADMIN — FERROUS SULFATE TAB 325 MG (65 MG ELEMENTAL FE) 325 MG: 325 (65 FE) TAB at 08:12

## 2023-12-20 RX ADMIN — Medication 250 MG: at 20:08

## 2023-12-20 RX ADMIN — SERTRALINE HYDROCHLORIDE 25 MG: 25 TABLET ORAL at 20:12

## 2023-12-20 RX ADMIN — CYANOCOBALAMIN TAB 500 MCG 1000 MCG: 500 TAB at 09:29

## 2023-12-20 RX ADMIN — INSULIN LISPRO 4 UNITS: 100 INJECTION, SOLUTION INTRAVENOUS; SUBCUTANEOUS at 20:09

## 2023-12-20 RX ADMIN — OXYCODONE AND ACETAMINOPHEN 1 TABLET: 7.5; 325 TABLET ORAL at 01:38

## 2023-12-20 RX ADMIN — BACLOFEN 10 MG: 10 TABLET ORAL at 16:56

## 2023-12-20 RX ADMIN — OXYCODONE AND ACETAMINOPHEN 1 TABLET: 7.5; 325 TABLET ORAL at 19:35

## 2023-12-20 RX ADMIN — Medication 10 MG: at 20:07

## 2023-12-20 RX ADMIN — PREGABALIN 100 MG: 100 CAPSULE ORAL at 09:29

## 2023-12-20 RX ADMIN — ATORVASTATIN CALCIUM 10 MG: 10 TABLET, FILM COATED ORAL at 20:07

## 2023-12-20 RX ADMIN — LISINOPRIL 2.5 MG: 2.5 TABLET ORAL at 09:29

## 2023-12-20 RX ADMIN — PREGABALIN 100 MG: 100 CAPSULE ORAL at 20:08

## 2023-12-20 RX ADMIN — PREGABALIN 100 MG: 100 CAPSULE ORAL at 15:42

## 2023-12-20 RX ADMIN — APIXABAN 5 MG: 5 TABLET, FILM COATED ORAL at 09:29

## 2023-12-20 RX ADMIN — INSULIN LISPRO 4 UNITS: 100 INJECTION, SOLUTION INTRAVENOUS; SUBCUTANEOUS at 17:41

## 2023-12-20 NOTE — THERAPY TREATMENT NOTE
Acute Care - Physical Therapy Initial Evaluation   Angelica     Patient Name: Jose Shaikh  : 1960  MRN: [id number]  Today's Date: 2023     Admit date: 2023     Referring Physician: Jose Maya MD     Surgery Date:* No surgery found *             Visit Dx:     ICD-10-CM ICD-9-CM   1. Difficulty in walking  R26.2 719.7     Patient Active Problem List   Diagnosis    Diabetic ulcer of left heel associated with type 2 DM    Acute osteomyelitis of left calcaneus     Diabetic ulcer of left heel associated with type 2 DM    Diabetic ulcer of right midfoot associated with type 2 DM    Paroxysmal atrial fibrillation    Essential hypertension    Hyperlipidemia LDL goal <100    Cellulitis and abscess of foot    High alkaline phosphatase level    Osteomyelitis    Onychomycosis    Onychocryptosis    Foot pain, bilateral    Osteomyelitis of foot, right, acute    Cellulitis of right foot    Type 2 diabetes mellitus, with long-term current use of insulin    Class 3 severe obesity due to excess calories with serious comorbidity and body mass index (BMI) of 45.0 to 49.9 in adult    Anxiety disorder, unspecified    Claustrophobia    Dependence on wheelchair    Depression, unspecified    Long term (current) use of anticoagulants    Long term (current) use of oral hypoglycemic drugs    Wound of foot    Non-prs chronic ulcer oth prt r foot limited to brkdwn skin    Orthostatic hypotension    Other chronic osteomyelitis, right ankle and foot    Personal history of nicotine dependence    Thrombocytopenia, unspecified    Unspecified open wound, right foot, initial encounter    Diabetic foot infection    Subacute osteomyelitis of right foot    Right foot pain    Sepsis    Onychomycosis    Foot pain, left    Impaired mobility and ADLs    Absence of toe of right foot    Corns and callosity    Disability of walking    Fracture    Limb swelling    Polyneuropathy    Pressure ulcer, stage 1    Shortness of breath     Generalized weakness    Debility     Past Medical History:   Diagnosis Date    Absence of toe of right foot     Acute osteomyelitis of left calcaneus  08/18/2021    Anxiety and depression     Arthritis     Cancer     Claustrophobia     Corns and callus     Diabetic ulcer of left heel associated with type 2 DM 08/18/2021    Diabetic ulcer of left heel associated with type 2 DM 07/06/2021    Diabetic ulcer of right midfoot associated with type 2 DM 08/18/2021    Difficulty walking     Essential hypertension 08/31/2021    Hammertoe     Hyperlipidemia LDL goal <100 08/31/2021    Ingrown toenail     Obesity     Paroxysmal atrial fibrillation 08/31/2021    Polyneuropathy     Pressure ulcer, stage 1     Tinea unguium     Type 2 diabetes mellitus with polyneuropathy      Past Surgical History:   Procedure Laterality Date    CYST REMOVAL      center of back; benign    EYE SURGERY      INCISION AND DRAINAGE ABSCESS      back    INCISION AND DRAINAGE LEG Right 12/10/2021    Procedure: INCISION AND DRAINAGE LOWER EXTREMITY;  Surgeon: Ash Leyva DPM;  Location: [facility] OR;  Service: Podiatry;  Laterality: Right;    OTHER SURGICAL HISTORY      Surgical clips left foot    TOE SURGERY Right     Removal of 5th toe    TRANS METATARSAL AMPUTATION Right 12/02/2021    Procedure: AMPUTATION TRANS METATARSAL;  Surgeon: Ash Leyva DPM;  Location: [facility] OR;  Service: Podiatry;  Laterality: Right;    VASCULAR SURGERY      WRIST SURGERY Left     repair of injury     PT Assessment (last 12 hours)       PT Evaluation and Treatment       Row Name 12/20/23 1300          Physical Therapy Time and Intention    Subjective Information no complaints  -MOE     Document Type therapy note (daily note)  -MOE     Mode of Treatment individual therapy;physical therapy  -MOE     Patient Effort good  -MOE       Row Name 12/20/23 1300          General Information    Patient Observations alert;cooperative;agree to therapy  -MOE        Row Name 12/20/23 1300          Bed Mobility    All Activities, [place] (Bed Mobility) standby assist  -MOE     Supine-Sit [place] (Bed Mobility) minimum assist (75% patient effort)  -MOE       Row Name 12/20/23 1300          Sit-Stand Transfer    Sit-Stand [place] (Transfers) minimum assist (75% patient effort)  -MOE     Assistive Device (Sit-Stand Transfers) walker, front-wheeled  -MOE       Row Name 12/20/23 1300          Stand-Sit Transfer    Stand-Sit [place] (Transfers) minimum assist (75% patient effort)  -MOE     Assistive Device (Stand-Sit Transfers) walker, front-wheeled  -MOE       Row Name 12/20/23 1300          Gait/Stairs (Locomotion)    Gait/Stairs Locomotion gait/ambulation assistive device  -MOE     [place] Level (Gait) minimum assist (75% patient effort)  -MOE     Assistive Device (Gait) walker, front-wheeled  -MOE     Distance in Feet (Gait) 2 ft x2  Pt assisted with RLE advancement during ambulation.  -MOE       Row Name 12/20/23 1300          Balance    Dynamic Standing Balance minimal assist  -MOE     Position/Device Used, Standing Balance walker, front-wheeled  -MOE       Row Name             Wound 11/07/23 1240 Right anterior foot    Wound - Properties Group Placement Date: 11/07/23  - Placement Time: 1240  -FH Side: Right  -FH Orientation: anterior  -FH Location: foot  -FH    Retired Wound - Properties Group Placement Date: 11/07/23  - Placement Time: 1240  -FH Side: Right  -FH Orientation: anterior  -FH Location: foot  -FH    Retired Wound - Properties Group Date first assessed: 11/07/23  - Time first assessed: 1240  -FH Side: Right  -FH Location: foot  -FH      Row Name 12/20/23 1300          Progress Summary (PT)    Progress Toward Functional Goals (PT) progress toward functional goals is gradual  -MOE               User Key  (r) = Recorded By, (t) = Taken By, (c) = Cosigned By      Initials Name Provider Type    Carlos Anguiano RN Registered Nurse    MOE  Merlin Rao PT Physical Therapist                    Physical Therapy Education       Title: PT OT SLP Therapies (Done)       Topic: Physical Therapy (Done)       Point: Mobility training (Done)       Learning Progress Summary             Patient Acceptance, E, VU by CR at 12/19/2023 1004    Acceptance, E,TB, VU by RM at 11/30/2023 0420    Acceptance, E,TB, VU by MOE at 11/8/2023 1341                         Point: Precautions (Done)       Learning Progress Summary             Patient Acceptance, E,TB, VU by  at 11/30/2023 0420    Acceptance, E,TB, VU by MOE at 11/8/2023 1341                                         User Key       Initials Effective Dates Name Provider Type Discipline     06/08/21 -  Bharath Berg RN Registered Nurse Nurse    ROLY 06/13/22 -  Adilson Baker LPN Licensed Nurse Nurse    MOE 06/03/21 -  Merlin Rao PT Physical Therapist PT                  PT Recommendation and Plan  Anticipated Discharge Disposition (PT): home with home health  Planned Therapy Interventions (PT): bed mobility training, balance training, gait training, joint mobilization, home exercise program, stair training, strengthening, transfer training  Therapy Frequency (PT): 6 times/wk  Progress Summary (PT)  Progress Toward Functional Goals (PT): progress toward functional goals is gradual  Daily Progress Summary (PT): Pt has made little to no progress toward his goals.  He is still very weak and will require skilled PT services to address his mobility issues but he will need to show more effort and progress druing this reevaluation period to continue to be appropriate.  Will continue current plan another 30 day period.  Pt was instructed that if no progress is made that we will have no choice but to d/c services due to lack of progress.  Plan of Care Reviewed With: patient  Outcome Evaluation: Patient presents with decreased strength, transfers and ambulation.  Skilled physical therapy services will be required  to address these mobility deficits.   Outcome Measures       Row Name 12/18/23 1027             How much help from another person do you currently need...    Turning from your back to your side while in flat bed without using bedrails? 3  -WM      Moving from lying on back to sitting on the side of a flat bed without bedrails? 2  -WM      Moving to and from a bed to a chair (including a wheelchair)? 1  -WM      Standing up from a chair using your arms (e.g., wheelchair, bedside chair)? 2  -WM      Climbing 3-5 steps with a railing? 1  -WM      To walk in hospital room? 1  -WM      AM-PAC 6 Clicks Score (PT) 10  -WM      Highest Level of Mobility Goal 4 --> Transfer to chair/commode  -WM                User Key  (r) = Recorded By, (t) = Taken By, (c) = Cosigned By      Initials Name Provider Type    WM Carson Inman PTA Physical Therapist Assistant                     Time Calculation:    PT Charges       Row Name 12/20/23 1301             Time Calculation    PT Received On 12/20/23  -MOE         Timed Charges    44990 - Gait Training Minutes  10  -MOE      03724 - PT Therapeutic Activity Minutes 6  -MOE         Total Minutes    Timed Charges Total Minutes 16  -MOE       Total Minutes 16  -MOE                User Key  (r) = Recorded By, (t) = Taken By, (c) = Cosigned By      Initials Name Provider Type    Merlin De La O, PT Physical Therapist                  Therapy Charges for Today       Code Description Service Date Service Provider Modifiers Qty    91054742124 HC GAIT TRAINING EA 15 MIN 12/19/2023 Merlin Rao, PT GP 1    60729179385 HC GAIT TRAINING EA 15 MIN 12/20/2023 Merlin Rao, PT GP 1            PT G-Codes  Outcome Measure Options: Optimal Instrument, AM-PAC 6 Clicks Daily Activity (OT)  AM-PAC 6 Clicks Score (PT): 10  AM-PAC 6 Clicks Score (OT): 16    Merlin Rao PT  12/20/2023

## 2023-12-20 NOTE — SIGNIFICANT NOTE
Wound Eval / Progress Noted     Angelica     Patient Name: Jose Shaikh  : 1960  MRN: [id number]  Today's Date: 2023                 Admit Date: 2023    Visit Dx:    ICD-10-CM ICD-9-CM   1. Difficulty in walking  R26.2 719.7         Debility        Past Medical History:   Diagnosis Date    Absence of toe of right foot     Acute osteomyelitis of left calcaneus  2021    Anxiety and depression     Arthritis     Cancer     Claustrophobia     Corns and callus     Diabetic ulcer of left heel associated with type 2 DM 2021    Diabetic ulcer of left heel associated with type 2 DM 2021    Diabetic ulcer of right midfoot associated with type 2 DM 2021    Difficulty walking     Essential hypertension 2021    Hammertoe     Hyperlipidemia LDL goal <100 2021    Ingrown toenail     Obesity     Paroxysmal atrial fibrillation 2021    Polyneuropathy     Pressure ulcer, stage 1     Tinea unguium     Type 2 diabetes mellitus with polyneuropathy         Past Surgical History:   Procedure Laterality Date    CYST REMOVAL      center of back; benign    EYE SURGERY      INCISION AND DRAINAGE ABSCESS      back    INCISION AND DRAINAGE LEG Right 12/10/2021    Procedure: INCISION AND DRAINAGE LOWER EXTREMITY;  Surgeon: Ash Leyva DPM;  Location: [facility] OR;  Service: Podiatry;  Laterality: Right;    OTHER SURGICAL HISTORY      Surgical clips left foot    TOE SURGERY Right     Removal of 5th toe    TRANS METATARSAL AMPUTATION Right 2021    Procedure: AMPUTATION TRANS METATARSAL;  Surgeon: Ash Leyva DPM;  Location: [facility] OR;  Service: Podiatry;  Laterality: Right;    VASCULAR SURGERY      WRIST SURGERY Left     repair of injury         Physical Assessment:  Wound 23 1240 Right anterior foot (Active)   Wound Image   23   Dressing Appearance dry;intact 23   Closure None 23   Base dry;pink;red 23  0926   Periwound dry;intact 12/20/23 0926   Periwound Temperature warm 12/20/23 0926   Periwound Skin Turgor soft 12/20/23 0926   Edges rolled/closed 12/20/23 0926   Drainage Amount none 12/20/23 0926   Care, Wound cleansed with;sterile normal saline 12/20/23 0926   Dressing Care dressing applied;petroleum-based;non-adherent;gauze;gauze, dry 12/20/23 0926   Periwound Care absorptive dressing applied 12/20/23 0926      Wound Check / Follow-up: Patient seen today for wound follow-up. Patient remains in skilled nursing facility for rehab. Remains on bariatric bed frame and mattress. Traumatic injury to right distal foot continues to improve. Some crusting was removed with cleansing today. Dry reddened tissue is present. Cleansed with normal saline and gauze. Applied non-adherent petroleum based gauze, covered with dry gauze, and secured with gauze roll. Recommending to continue current care at this time. Skin folds with minimal moisture. No fungal presentation. Recommending to discontinue use of magic barrier cream at this time and transition to cornstarch powder.      Impression: Traumatic injury.       Short term goals: Regain skin integrity, skin protection, pressure reduction, moisture prevention, daily dressing changes, skin care.        Dottie Guevara RN    12/20/2023    13:15 EST

## 2023-12-20 NOTE — PLAN OF CARE
"Goal Outcome Evaluation:  Plan of Care Reviewed With: patient        Progress: no change  Outcome Evaluation: Alert and oriented x4; able to call for assist as needed. Utilized urinal and bedpan this shift. Shifting weight independently with assist of trapeze bar but refuses turns. Percocet administered x2 and Baclofen x1 for left hip pain described as \"bone on bone\". Call light within reach; care plan ongoing.         "

## 2023-12-20 NOTE — PLAN OF CARE
Goal Outcome Evaluation:   Alert and oriented and pleasant with staff. x2Max assist for transfers and ambulation. X2 admin PRN pain medication with relief of symptoms noted this shift. Shows improved endurance and mobility this shift. New order for skin care routine per wound care nurse received this shift. Sitting up in bed call light in reach.

## 2023-12-20 NOTE — THERAPY TREATMENT NOTE
Patient Name: Jose Shaikh  : 1960    MRN: [id number]                              Today's Date: 2023       Admit Date: 2023    Visit Dx:     ICD-10-CM ICD-9-CM   1. Difficulty in walking  R26.2 719.7     Patient Active Problem List   Diagnosis    Diabetic ulcer of left heel associated with type 2 DM    Acute osteomyelitis of left calcaneus     Diabetic ulcer of left heel associated with type 2 DM    Diabetic ulcer of right midfoot associated with type 2 DM    Paroxysmal atrial fibrillation    Essential hypertension    Hyperlipidemia LDL goal <100    Cellulitis and abscess of foot    High alkaline phosphatase level    Osteomyelitis    Onychomycosis    Onychocryptosis    Foot pain, bilateral    Osteomyelitis of foot, right, acute    Cellulitis of right foot    Type 2 diabetes mellitus, with long-term current use of insulin    Class 3 severe obesity due to excess calories with serious comorbidity and body mass index (BMI) of 45.0 to 49.9 in adult    Anxiety disorder, unspecified    Claustrophobia    Dependence on wheelchair    Depression, unspecified    Long term (current) use of anticoagulants    Long term (current) use of oral hypoglycemic drugs    Wound of foot    Non-prs chronic ulcer oth prt r foot limited to brkdwn skin    Orthostatic hypotension    Other chronic osteomyelitis, right ankle and foot    Personal history of nicotine dependence    Thrombocytopenia, unspecified    Unspecified open wound, right foot, initial encounter    Diabetic foot infection    Subacute osteomyelitis of right foot    Right foot pain    Sepsis    Onychomycosis    Foot pain, left    Impaired mobility and ADLs    Absence of toe of right foot    Corns and callosity    Disability of walking    Fracture    Limb swelling    Polyneuropathy    Pressure ulcer, stage 1    Shortness of breath    Generalized weakness    Debility     Past Medical History:   Diagnosis Date    Absence of toe of right foot     Acute  osteomyelitis of left calcaneus  08/18/2021    Anxiety and depression     Arthritis     Cancer     Claustrophobia     Corns and callus     Diabetic ulcer of left heel associated with type 2 DM 08/18/2021    Diabetic ulcer of left heel associated with type 2 DM 07/06/2021    Diabetic ulcer of right midfoot associated with type 2 DM 08/18/2021    Difficulty walking     Essential hypertension 08/31/2021    Hammertoe     Hyperlipidemia LDL goal <100 08/31/2021    Ingrown toenail     Obesity     Paroxysmal atrial fibrillation 08/31/2021    Polyneuropathy     Pressure ulcer, stage 1     Tinea unguium     Type 2 diabetes mellitus with polyneuropathy      Past Surgical History:   Procedure Laterality Date    CYST REMOVAL      center of back; benign    EYE SURGERY      INCISION AND DRAINAGE ABSCESS      back    INCISION AND DRAINAGE LEG Right 12/10/2021    Procedure: INCISION AND DRAINAGE LOWER EXTREMITY;  Surgeon: Ash Leyva DPM;  Location: [facility];  Service: Podiatry;  Laterality: Right;    OTHER SURGICAL HISTORY      Surgical clips left foot    TOE SURGERY Right     Removal of 5th toe    TRANS METATARSAL AMPUTATION Right 12/02/2021    Procedure: AMPUTATION TRANS METATARSAL;  Surgeon: Ash Leyva DPM;  Location: [facility] OR;  Service: Podiatry;  Laterality: Right;    VASCULAR SURGERY      WRIST SURGERY Left     repair of injury      General Information       Row Name 12/20/23 1223          OT Time and Intention    Document Type therapy note (daily note)  -PG     Mode of Treatment individual therapy;occupational therapy  -PG               User Key  (r) = Recorded By, (t) = Taken By, (c) = Cosigned By      Initials Name Provider Type    PG Kodak Matos OT Occupational Therapist                     Mobility/ADL's       Row Name 12/20/23 1224          Transfers    Transfers sit-stand transfer  -PG       Row Name 12/20/23 1224          Sit-Stand Transfer    Sit-Stand [place] (Transfers)  moderate assist (50% patient effort)  -PG     Assistive Device (Sit-Stand Transfers) walker, front-wheeled  -PG       Row Name 12/20/23 1224          Stand-Sit Transfer    Stand-Sit [place] (Transfers) moderate assist (50% patient effort);2 person assist;verbal cues  -PG     Assistive Device (Stand-Sit Transfers) bariatric;walker, front-wheeled  -PG               User Key  (r) = Recorded By, (t) = Taken By, (c) = Cosigned By      Initials Name Provider Type    PG Kodak Matos OT Occupational Therapist                   Obj/Interventions       Row Name 12/20/23 1224          Shoulder (Therapeutic Exercise)    Shoulder (Therapeutic Exercise) strengthening exercise  -PG     Shoulder Strengthening (Therapeutic Exercise) 15 repititions;5 lb free weight;2 sets  -PG       Row Name 12/20/23 1224          Elbow/Forearm (Therapeutic Exercise)    Elbow/Forearm (Therapeutic Exercise) strengthening exercise  -PG     Elbow/Forearm Strengthening (Therapeutic Exercise) 15 repititions;2 sets;5 lb free weight  -PG       Row Name 12/20/23 1224          Motor Skills    Therapeutic Exercise shoulder;elbow/forearm  -PG               User Key  (r) = Recorded By, (t) = Taken By, (c) = Cosigned By      Initials Name Provider Type    PG Kodak Matos OT Occupational Therapist                   Goals/Plan    No documentation.                  Clinical Impression       Row Name 12/20/23 1226          Plan of Care Review    Progress no change  -PG               User Key  (r) = Recorded By, (t) = Taken By, (c) = Cosigned By      Initials Name Provider Type    Kodak Velazco OT Occupational Therapist                   Outcome Measures       Row Name 12/20/23 1228          How much help from another is currently needed...    Putting on and taking off regular lower body clothing? 1  -PG     Bathing (including washing, rinsing, and drying) 2  -PG     Toileting (which includes using toilet bed pan or urinal) 1  -PG     Putting on and  taking off regular upper body clothing 4  -PG     Taking care of personal grooming (such as brushing teeth) 4  -PG     Eating meals 4  -PG     AM-PAC 6 Clicks Score (OT) 16  -PG       Row Name 12/20/23 1228          Functional Assessment    Outcome Measure Options Optimal Instrument;AM-PAC 6 Clicks Daily Activity (OT)  -PG       Row Name 12/20/23 1228          Optimal Instrument    Optimal Instrument Optimal - 3  -PG     Bending/Stooping 4  -PG     Standing 4  -PG     Reaching 2  -PG               User Key  (r) = Recorded By, (t) = Taken By, (c) = Cosigned By      Initials Name Provider Type    PG Kodak Matos OT Occupational Therapist                    Occupational Therapy Education       Title: PT OT SLP Therapies (Done)       Topic: Occupational Therapy (Done)       Point: ADL training (Done)       Description:   Instruct learner(s) on proper safety adaptation and remediation techniques during self care or transfers.   Instruct in proper use of assistive devices.                  Learning Progress Summary             Patient Acceptance, E,TB, VU by  at 11/30/2023 0420    Acceptance, E,D, DU by PG at 11/7/2023 0932                         Point: Home exercise program (Done)       Description:   Instruct learner(s) on appropriate technique for monitoring, assisting and/or progressing therapeutic exercises/activities.                  Learning Progress Summary             Patient Acceptance, E,TB, VU by  at 11/30/2023 0420    Acceptance, E,D, DU by PG at 11/7/2023 0932                         Point: Precautions (Done)       Description:   Instruct learner(s) on prescribed precautions during self-care and functional transfers.                  Learning Progress Summary             Patient Acceptance, E,TB, VU by  at 11/30/2023 0420    Acceptance, E,D, DU by PG at 11/7/2023 0932                         Point: Body mechanics (Done)       Description:   Instruct learner(s) on proper positioning and spine  alignment during self-care, functional mobility activities and/or exercises.                  Learning Progress Summary             Patient Acceptance, E,TB, VU by  at 11/30/2023 0420    Acceptance, E,D, DU by PG at 11/7/2023 0932                                         User Key       Initials Effective Dates Name Provider Type Discipline     06/08/21 -  Bharath Berg RN Registered Nurse Nurse    PG 06/16/21 -  Kodak Matos OT Occupational Therapist OT                  OT Recommendation and Plan  Planned Therapy Interventions (OT): activity tolerance training, BADL retraining, strengthening exercise, transfer/mobility retraining, patient/caregiver education/training, occupation/activity based interventions  Therapy Frequency (OT): 5 times/wk  Plan of Care Review  Plan of Care Reviewed With: patient  Progress: no change  Outcome Evaluation: Patient participated in upper body strengthening exercises utilizing 6 pound weights, 2 sets x 15 repetitions.  Patient stood mod assist x 2 and able to advance feet 3 inches with assist to advance right foot forward and 2 others to push bed.  Continue OT services per plan of care     Time Calculation:   Evaluation Complexity (OT)  Review Occupational Profile/Medical/Therapy History Complexity: brief/low complexity  Assessment, Occupational Performance/Identification of Deficit Complexity: 3-5 performance deficits  Clinical Decision Making Complexity (OT): problem focused assessment/low complexity  Overall Complexity of Evaluation (OT): low complexity     Time Calculation- OT       Row Name 12/20/23 1230 12/20/23 1229          Time Calculation- OT    OT Received On -- 12/20/23  -PG     OT Goal Re-Cert Due Date -- 12/27/23  -PG        Timed Charges    64519 - OT Therapeutic Exercise Minutes -- 15  -PG     92041 - OT Therapeutic Activity Minutes -- 20  -PG        SNF Occupational Therapy Minutes    Skilled Minutes- OT 35 min  -PG --        Total Minutes    Timed Charges  Total Minutes -- 35  -PG      Total Minutes -- 35  -PG               User Key  (r) = Recorded By, (t) = Taken By, (c) = Cosigned By      Initials Name Provider Type    PG Kodak Matos OT Occupational Therapist                  Therapy Charges for Today       Code Description Service Date Service Provider Modifiers Qty    24878978891 HC OT THER PROC EA 15 MIN 12/19/2023 Kodak Matos OT GO 1    86876148015 HC OT THERAPEUTIC ACT EA 15 MIN 12/19/2023 Kodak Matos OT GO 2    88161210650 HC OT THER PROC EA 15 MIN 12/20/2023 Kodak Matos OT GO 1    65090698315 HC OT THERAPEUTIC ACT EA 15 MIN 12/20/2023 Kodak Matos OT GO 1                 Kodak Matos OT  12/20/2023

## 2023-12-21 LAB
GLUCOSE BLDC GLUCOMTR-MCNC: 111 MG/DL (ref 70–99)
GLUCOSE BLDC GLUCOMTR-MCNC: 118 MG/DL (ref 70–99)
GLUCOSE BLDC GLUCOMTR-MCNC: 120 MG/DL (ref 70–99)
GLUCOSE BLDC GLUCOMTR-MCNC: 98 MG/DL (ref 70–99)
SARS-COV-2 RNA RESP QL NAA+PROBE: NOT DETECTED

## 2023-12-21 PROCEDURE — 97110 THERAPEUTIC EXERCISES: CPT

## 2023-12-21 PROCEDURE — 87635 SARS-COV-2 COVID-19 AMP PRB: CPT | Performed by: PHYSICIAN ASSISTANT

## 2023-12-21 PROCEDURE — 63710000001 INSULIN DETEMIR PER 5 UNITS: Performed by: PHYSICIAN ASSISTANT

## 2023-12-21 PROCEDURE — 97530 THERAPEUTIC ACTIVITIES: CPT

## 2023-12-21 PROCEDURE — 82948 REAGENT STRIP/BLOOD GLUCOSE: CPT

## 2023-12-21 RX ADMIN — PREGABALIN 100 MG: 100 CAPSULE ORAL at 08:26

## 2023-12-21 RX ADMIN — LISINOPRIL 2.5 MG: 2.5 TABLET ORAL at 08:26

## 2023-12-21 RX ADMIN — OXYCODONE AND ACETAMINOPHEN 1 TABLET: 7.5; 325 TABLET ORAL at 02:24

## 2023-12-21 RX ADMIN — PREGABALIN 100 MG: 100 CAPSULE ORAL at 20:35

## 2023-12-21 RX ADMIN — OXYCODONE AND ACETAMINOPHEN 1 TABLET: 7.5; 325 TABLET ORAL at 10:46

## 2023-12-21 RX ADMIN — ATORVASTATIN CALCIUM 10 MG: 10 TABLET, FILM COATED ORAL at 20:35

## 2023-12-21 RX ADMIN — EMPAGLIFLOZIN 10 MG: 10 TABLET, FILM COATED ORAL at 08:26

## 2023-12-21 RX ADMIN — PREGABALIN 100 MG: 100 CAPSULE ORAL at 17:26

## 2023-12-21 RX ADMIN — APIXABAN 5 MG: 5 TABLET, FILM COATED ORAL at 20:35

## 2023-12-21 RX ADMIN — Medication 250 MG: at 08:26

## 2023-12-21 RX ADMIN — OXYCODONE AND ACETAMINOPHEN 1 TABLET: 7.5; 325 TABLET ORAL at 19:36

## 2023-12-21 RX ADMIN — INSULIN DETEMIR 60 UNITS: 100 INJECTION, SOLUTION SUBCUTANEOUS at 20:35

## 2023-12-21 RX ADMIN — APIXABAN 5 MG: 5 TABLET, FILM COATED ORAL at 08:26

## 2023-12-21 RX ADMIN — SERTRALINE HYDROCHLORIDE 25 MG: 25 TABLET ORAL at 20:35

## 2023-12-21 RX ADMIN — Medication 250 MG: at 20:35

## 2023-12-21 RX ADMIN — BACLOFEN 10 MG: 10 TABLET ORAL at 01:29

## 2023-12-21 RX ADMIN — BACLOFEN 10 MG: 10 TABLET ORAL at 12:24

## 2023-12-21 RX ADMIN — FERROUS SULFATE TAB 325 MG (65 MG ELEMENTAL FE) 325 MG: 325 (65 FE) TAB at 08:26

## 2023-12-21 RX ADMIN — INSULIN DETEMIR 70 UNITS: 100 INJECTION, SOLUTION SUBCUTANEOUS at 08:25

## 2023-12-21 RX ADMIN — CYANOCOBALAMIN TAB 500 MCG 1000 MCG: 500 TAB at 08:26

## 2023-12-21 NOTE — PLAN OF CARE
Goal Outcome Evaluation:  Plan of Care Reviewed With: patient           Outcome Evaluation: No significant changes noted. uses the urinal and bedpan. Still refuses turns but can turn himself using the bedrails and trapeze bar. Medicated for pain x 2 .  Will continue with his plan of care. Call light and personal items within reach.

## 2023-12-21 NOTE — THERAPY TREATMENT NOTE
SNF - Occupational Therapy Treatment Note  [facility]    Patient Name: Jose Shaikh  : 1960    MRN: [id number]                              Today's Date: 2023       Admit Date: 2023    Visit Dx:     ICD-10-CM ICD-9-CM   1. Difficulty in walking  R26.2 719.7     Patient Active Problem List   Diagnosis    Diabetic ulcer of left heel associated with type 2 DM    Acute osteomyelitis of left calcaneus     Diabetic ulcer of left heel associated with type 2 DM    Diabetic ulcer of right midfoot associated with type 2 DM    Paroxysmal atrial fibrillation    Essential hypertension    Hyperlipidemia LDL goal <100    Cellulitis and abscess of foot    High alkaline phosphatase level    Osteomyelitis    Onychomycosis    Onychocryptosis    Foot pain, bilateral    Osteomyelitis of foot, right, acute    Cellulitis of right foot    Type 2 diabetes mellitus, with long-term current use of insulin    Class 3 severe obesity due to excess calories with serious comorbidity and body mass index (BMI) of 45.0 to 49.9 in adult    Anxiety disorder, unspecified    Claustrophobia    Dependence on wheelchair    Depression, unspecified    Long term (current) use of anticoagulants    Long term (current) use of oral hypoglycemic drugs    Wound of foot    Non-prs chronic ulcer oth prt r foot limited to brkdwn skin    Orthostatic hypotension    Other chronic osteomyelitis, right ankle and foot    Personal history of nicotine dependence    Thrombocytopenia, unspecified    Unspecified open wound, right foot, initial encounter    Diabetic foot infection    Subacute osteomyelitis of right foot    Right foot pain    Sepsis    Onychomycosis    Foot pain, left    Impaired mobility and ADLs    Absence of toe of right foot    Corns and callosity    Disability of walking    Fracture    Limb swelling    Polyneuropathy    Pressure ulcer, stage 1    Shortness of breath    Generalized weakness    Debility     Past Medical History:   Diagnosis  Date    Absence of toe of right foot     Acute osteomyelitis of left calcaneus  08/18/2021    Anxiety and depression     Arthritis     Cancer     Claustrophobia     Corns and callus     Diabetic ulcer of left heel associated with type 2 DM 08/18/2021    Diabetic ulcer of left heel associated with type 2 DM 07/06/2021    Diabetic ulcer of right midfoot associated with type 2 DM 08/18/2021    Difficulty walking     Essential hypertension 08/31/2021    Hammertoe     Hyperlipidemia LDL goal <100 08/31/2021    Ingrown toenail     Obesity     Paroxysmal atrial fibrillation 08/31/2021    Polyneuropathy     Pressure ulcer, stage 1     Tinea unguium     Type 2 diabetes mellitus with polyneuropathy      Past Surgical History:   Procedure Laterality Date    CYST REMOVAL      center of back; benign    EYE SURGERY      INCISION AND DRAINAGE ABSCESS      back    INCISION AND DRAINAGE LEG Right 12/10/2021    Procedure: INCISION AND DRAINAGE LOWER EXTREMITY;  Surgeon: Ash Leyva DPM;  Location: [facility]  Service: Podiatry;  Laterality: Right;    OTHER SURGICAL HISTORY      Surgical clips left foot    TOE SURGERY Right     Removal of 5th toe    TRANS METATARSAL AMPUTATION Right 12/02/2021    Procedure: AMPUTATION TRANS METATARSAL;  Surgeon: Ash Leyva DPM;  Location: [facility] OR;  Service: Podiatry;  Laterality: Right;    VASCULAR SURGERY      WRIST SURGERY Left     repair of injury      General Information       Row Name 12/21/23 1259          OT Time and Intention    Document Type therapy note (daily note)  -PG     Mode of Treatment individual therapy;occupational therapy  -PG               User Key  (r) = Recorded By, (t) = Taken By, (c) = Cosigned By      Initials Name Provider Type    PG Kodak Matos OT Occupational Therapist                     Mobility/ADL's       Row Name 12/21/23 1300          Sit-Stand Transfer    Sit-Stand [place] (Transfers) moderate assist (50% patient  effort);2 person assist;verbal cues  -PG     Assistive Device (Sit-Stand Transfers) walker, front-wheeled  -PG       Row Name 12/21/23 1300          Stand-Sit Transfer    Stand-Sit [place] (Transfers) moderate assist (50% patient effort);2 person assist;verbal cues  -PG               User Key  (r) = Recorded By, (t) = Taken By, (c) = Cosigned By      Initials Name Provider Type    PG Kodak Matos OT Occupational Therapist                   Obj/Interventions       Row Name 12/21/23 1300          Shoulder (Therapeutic Exercise)    Shoulder Strengthening (Therapeutic Exercise) 15 repititions;2 sets;4 lb free weight  -PG       Row Name 12/21/23 1300          Elbow/Forearm (Therapeutic Exercise)    Elbow/Forearm Strengthening (Therapeutic Exercise) 4 lb free weight;15 repititions;2 sets  -PG       Row Name 12/21/23 1300          Motor Skills    Therapeutic Exercise shoulder;elbow/forearm  -PG               User Key  (r) = Recorded By, (t) = Taken By, (c) = Cosigned By      Initials Name Provider Type    PG Kodak Matos, BEKAH Occupational Therapist                   Goals/Plan    No documentation.                  Clinical Impression       Row Name 12/21/23 1300          Plan of Care Review    Progress no change  -PG               User Key  (r) = Recorded By, (t) = Taken By, (c) = Cosigned By      Initials Name Provider Type    PG Kodak Matos OT Occupational Therapist                   Outcome Measures       Row Name 12/21/23 1300          How much help from another is currently needed...    Putting on and taking off regular lower body clothing? 1  -PG     Bathing (including washing, rinsing, and drying) 2  -PG     Toileting (which includes using toilet bed pan or urinal) 1  -PG     Putting on and taking off regular upper body clothing 3  -PG     Taking care of personal grooming (such as brushing teeth) 3  -PG     Eating meals 4  -PG     AM-PAC 6 Clicks Score (OT) 14  -PG       Row Name 12/21/23 1225 12/21/23  0800       How much help from another person do you currently need...    Turning from your back to your side while in flat bed without using bedrails? 3  -WM 3  -NR    Moving from lying on back to sitting on the side of a flat bed without bedrails? 2  -WM 2  -NR    Moving to and from a bed to a chair (including a wheelchair)? 1  -WM 1  -NR    Standing up from a chair using your arms (e.g., wheelchair, bedside chair)? 2  -WM 2  -NR    Climbing 3-5 steps with a railing? 1  -WM 1  -NR    To walk in hospital room? 1  -WM 1  -NR    AM-PAC 6 Clicks Score (PT) 10  -WM 10  -NR    Highest Level of Mobility Goal 4 --> Transfer to chair/commode  -WM 4 --> Transfer to chair/commode  -NR      Row Name 12/21/23 1300          Functional Assessment    Outcome Measure Options AM-PAC 6 Clicks Daily Activity (OT);Optimal Instrument  -PG       Row Name 12/21/23 1300          Optimal Instrument    Optimal Instrument Optimal - 3  -PG     Bending/Stooping 4  -PG     Standing 4  -PG     Reaching 1  -PG               User Key  (r) = Recorded By, (t) = Taken By, (c) = Cosigned By      Initials Name Provider Type    WM Carson Inman PTA Physical Therapist Assistant    PG Kodak Matos OT Occupational Therapist    NR Susana Galicia, RN Registered Nurse                  Section G              Section GG  SectionGG: Functional Ability/Goals, Adm  Self Care, Prior Functioning (IV1982C): 1. Dependent  Upper Extremity Range of Motion (EK2650J): No impairment  Self Care, Admission (Section GG)  Eating: Self-Care Admission Performance (KT0287M6): independent (06)  Oral Hygiene: Self-Care Admission Performance (AT0221J2): independent (06)  Toileting Hygiene: Self-Care Admission Performance (JG1734J8): dependent (01)  Shower/Bathe Self: Self-Care Admission Performance (SF1978F3): substantial/maximal assistance (02)  Upper Body Dressing: Self-Care Admission Performance (AU8939V3): partial/moderate assistance (03)  Lower Body Dressing: Self-Care  Admission Performance (RN1151N1): dependent (01)  Putting On/Taking Off Footwear: Self-Care Admission Performance (CC1611H3): dependent (01)  Personal Hygiene: Self-Care Admission Performance (VD5775Y6): dependent (01)  Mobility, Admission Performance (LV7182)  Toilet Transfer: Mobility Admission Performance (MI2525N1): not attempted, medical condition/safety concern (88)  Section GG: Functional Ability/Goals, DC  Eating: Self-Care Discharge Goal (DB8923E3): independent (06)  Oral Hygiene: Self-Care Discharge Goal (SC0199J8): independent (06)  Shower/Bathe Self: Self-Care Discharge Goal (YZ1217F4): supervision or touching assistance (04)  Upper Body Dressing: Self-Care Discharge Goal (TZ7146L0): setup or clean-up assistance (05)  Lower Body Dressing: Self-Care Discharge Goal (KX8316X5): supervision or touching assistance (04)  Putting On/Taking Off Footwear: Self-Care Discharge Goal (BL1985H1): supervision or touching assistance (04)  Personal Hygiene: Self-Care Discharge Goal (HG6250M8): supervision or touching assistance (04)  Mobility (GG), Discharge Goal (OA1915)  Toilet Transfer: Mobility Discharge Goal (MD5649Z6): supervision or touching assistance (04)          Occupational Therapy Education       Title: PT OT SLP Therapies (Done)       Topic: Occupational Therapy (Done)       Point: ADL training (Done)       Description:   Instruct learner(s) on proper safety adaptation and remediation techniques during self care or transfers.   Instruct in proper use of assistive devices.                  Learning Progress Summary             Patient Acceptance, E,TB, VU by RM at 11/30/2023 0420    Acceptance, E,D, DU by PG at 11/7/2023 0932                         Point: Home exercise program (Done)       Description:   Instruct learner(s) on appropriate technique for monitoring, assisting and/or progressing therapeutic exercises/activities.                  Learning Progress Summary             Patient Acceptance, E,TB,  VU by  at 11/30/2023 0420    Acceptance, E,D, DU by PG at 11/7/2023 0932                         Point: Precautions (Done)       Description:   Instruct learner(s) on prescribed precautions during self-care and functional transfers.                  Learning Progress Summary             Patient Acceptance, E,TB, VU by  at 11/30/2023 0420    Acceptance, E,D, DU by PG at 11/7/2023 0932                         Point: Body mechanics (Done)       Description:   Instruct learner(s) on proper positioning and spine alignment during self-care, functional mobility activities and/or exercises.                  Learning Progress Summary             Patient Acceptance, E,TB, VU by  at 11/30/2023 0420    Acceptance, E,D, DU by PG at 11/7/2023 0932                                         User Key       Initials Effective Dates Name Provider Type Discipline     06/08/21 -  Bharath Berg RN Registered Nurse Nurse    PG 06/16/21 -  Kodak Matos OT Occupational Therapist OT                  OT Recommendation and Plan  Planned Therapy Interventions (OT): activity tolerance training, BADL retraining, strengthening exercise, transfer/mobility retraining, patient/caregiver education/training, occupation/activity based interventions  Therapy Frequency (OT): 5 times/wk  Plan of Care Review  Plan of Care Reviewed With: patient  Progress: no change  Outcome Evaluation: Patient participated in upper body strengthening exercises utilizing 6 pound weights, 2 sets x 15 repetitions.  Patient stood mod assist x 2 and able to advance feet 3 inches with assist to advance right foot forward and 2 others to push bed.  Continue OT services per plan of care     Time Calculation:   Evaluation Complexity (OT)  Review Occupational Profile/Medical/Therapy History Complexity: brief/low complexity  Assessment, Occupational Performance/Identification of Deficit Complexity: 3-5 performance deficits  Clinical Decision Making Complexity (OT): problem  focused assessment/low complexity  Overall Complexity of Evaluation (OT): low complexity     Time Calculation- OT       Row Name 12/21/23 1301             Time Calculation- OT    OT Received On 12/21/23  -PG      OT Goal Re-Cert Due Date 12/27/23  -PG         Timed Charges    86103 - OT Therapeutic Exercise Minutes 15  -PG      52776 - OT Therapeutic Activity Minutes 15  -PG         SNF Occupational Therapy Minutes    Skilled Minutes- OT 30 min  -PG         Total Minutes    Timed Charges Total Minutes 30  -PG       Total Minutes 30  -PG                User Key  (r) = Recorded By, (t) = Taken By, (c) = Cosigned By      Initials Name Provider Type    PG Kodak Matos OT Occupational Therapist                  Therapy Charges for Today       Code Description Service Date Service Provider Modifiers Qty    17396055965 HC OT THER PROC EA 15 MIN 12/20/2023 Kodak Matos OT GO 1    89625851201 HC OT THERAPEUTIC ACT EA 15 MIN 12/20/2023 Kodak Matos OT GO 1    10493517617 HC OT THER PROC EA 15 MIN 12/21/2023 Kodak Matos OT GO 1    63093894901 HC OT THERAPEUTIC ACT EA 15 MIN 12/21/2023 Kodak Matos OT GO 1                 Kodak Matos OT  12/21/2023

## 2023-12-21 NOTE — NURSING NOTE
Patient refusing to allow staff to turn him. He has been educated on the importance of turning , to prevent skin breakdown but still refuses. He did communicate to this RN earlier that he intends to get up and walk in his room tomorrow.

## 2023-12-21 NOTE — PLAN OF CARE
Goal Outcome Evaluation:   VSS. No acute distress noted. Refusing to let staff turn him for most of the shift, but did finally allow staff to turn him this afternoon.  Re-enforced with patient the importance of turning to prevent skin breakdown. [id number], Medicated for pain once as well as muscle relaxer, see MAR.

## 2023-12-21 NOTE — THERAPY TREATMENT NOTE
SNF - Physical Therapy Treatment Note  [facility]ing[facility]     Patient Name: Jose Shaikh  : 1960  MRN: [id number]  Today's Date: 2023      Visit Dx:    ICD-10-CM ICD-9-CM   1. Difficulty in walking  R26.2 719.7     Patient Active Problem List   Diagnosis    Diabetic ulcer of left heel associated with type 2 DM    Acute osteomyelitis of left calcaneus     Diabetic ulcer of left heel associated with type 2 DM    Diabetic ulcer of right midfoot associated with type 2 DM    Paroxysmal atrial fibrillation    Essential hypertension    Hyperlipidemia LDL goal <100    Cellulitis and abscess of foot    High alkaline phosphatase level    Osteomyelitis    Onychomycosis    Onychocryptosis    Foot pain, bilateral    Osteomyelitis of foot, right, acute    Cellulitis of right foot    Type 2 diabetes mellitus, with long-term current use of insulin    Class 3 severe obesity due to excess calories with serious comorbidity and body mass index (BMI) of 45.0 to 49.9 in adult    Anxiety disorder, unspecified    Claustrophobia    Dependence on wheelchair    Depression, unspecified    Long term (current) use of anticoagulants    Long term (current) use of oral hypoglycemic drugs    Wound of foot    Non-prs chronic ulcer oth prt r foot limited to brkdwn skin    Orthostatic hypotension    Other chronic osteomyelitis, right ankle and foot    Personal history of nicotine dependence    Thrombocytopenia, unspecified    Unspecified open wound, right foot, initial encounter    Diabetic foot infection    Subacute osteomyelitis of right foot    Right foot pain    Sepsis    Onychomycosis    Foot pain, left    Impaired mobility and ADLs    Absence of toe of right foot    Corns and callosity    Disability of walking    Fracture    Limb swelling    Polyneuropathy    Pressure ulcer, stage 1    Shortness of breath    Generalized weakness    Debility     Past Medical History:   Diagnosis Date    Absence of toe of right foot     Acute osteomyelitis  of left calcaneus  08/18/2021    Anxiety and depression     Arthritis     Cancer     Claustrophobia     Corns and callus     Diabetic ulcer of left heel associated with type 2 DM 08/18/2021    Diabetic ulcer of left heel associated with type 2 DM 07/06/2021    Diabetic ulcer of right midfoot associated with type 2 DM 08/18/2021    Difficulty walking     Essential hypertension 08/31/2021    Hammertoe     Hyperlipidemia LDL goal <100 08/31/2021    Ingrown toenail     Obesity     Paroxysmal atrial fibrillation 08/31/2021    Polyneuropathy     Pressure ulcer, stage 1     Tinea unguium     Type 2 diabetes mellitus with polyneuropathy      Past Surgical History:   Procedure Laterality Date    CYST REMOVAL      center of back; benign    EYE SURGERY      INCISION AND DRAINAGE ABSCESS      back    INCISION AND DRAINAGE LEG Right 12/10/2021    Procedure: INCISION AND DRAINAGE LOWER EXTREMITY;  Surgeon: Ash Leyva DPM;  Location: [facility];  Service: Podiatry;  Laterality: Right;    OTHER SURGICAL HISTORY      Surgical clips left foot    TOE SURGERY Right     Removal of 5th toe    TRANS METATARSAL AMPUTATION Right 12/02/2021    Procedure: AMPUTATION TRANS METATARSAL;  Surgeon: Ash Leyva DPM;  Location: [facility];  Service: Podiatry;  Laterality: Right;    VASCULAR SURGERY      WRIST SURGERY Left     repair of injury       PT Assessment (last 12 hours)       PT Evaluation and Treatment       Row Name 12/21/23 1211          Physical Therapy Time and Intention    Document Type therapy note (daily note)  -WM     Mode of Treatment individual therapy;physical therapy  -WM     Patient Effort good  -WM     Symptoms Noted During/After Treatment fatigue  -WM       Row Name 12/21/23 1211          Pain    Pretreatment Pain Rating 6/10  -WM     Posttreatment Pain Rating 10/10  -WM     Pain Location - Side/Orientation Left  -WM     Pain Location - hip;knee  -WM     Pain Intervention(s)  Repositioned;Rest;Emotional support  -       Row Name 12/21/23 1211          Bed Mobility    Supine-Sit-Supine Linn (Bed Mobility) minimum assist (75% patient effort);verbal cues  -     Bed Mobility, Safety Issues decreased use of legs for bridging/pushing  -     Assistive Device (Bed Mobility) bed rails;overhead trapeze  -       Row Name 12/21/23 1211          Sit-Stand Transfer    Sit-Stand Linn (Transfers) moderate assist (50% patient effort);2 person assist;verbal cues  -     Assistive Device (Sit-Stand Transfers) walker, front-wheeled  -       Row Name 12/21/23 1211          Stand-Sit Transfer    Stand-Sit Linn (Transfers) moderate assist (50% patient effort);2 person assist;verbal cues  -     Assistive Device (Stand-Sit Transfers) walker, front-wheeled  -       Row Name 12/21/23 1211          Safety Issues, Functional Mobility    Impairments Affecting Function (Mobility) balance;endurance/activity tolerance;pain;range of motion (ROM);strength  -       Row Name 12/21/23 1211          Hip (Therapeutic Exercise)    Hip AAROM (Therapeutic Exercise) bilateral;aBduction;aDduction;supine;10 repetitions;2 sets  -     Hip Isometrics (Therapeutic Exercise) bilateral;gluteal sets;supine;10 repetitions;3 second hold;2 sets  -     Hip Strengthening (Therapeutic Exercise) bilateral;heel slides;supine;20 repititions  Manual resistance  -       Row Name 12/21/23 1211          Knee (Therapeutic Exercise)    Knee AROM (Therapeutic Exercise) bilateral;SAQ (short arc quad);supine;20 repititions  -     Knee Isometrics (Therapeutic Exercise) bilateral;quad sets;supine;10 repetitions;3 second hold;2 sets  -     Knee Strengthening (Therapeutic Exercise) bilateral;SLR (straight leg raise);supine;20 repititions  Active-assisted  -       Row Name 12/21/23 1211          Ankle (Therapeutic Exercise)    Ankle AROM (Therapeutic Exercise) bilateral;dorsiflexion;plantarflexion;supine;20  repititions  -       Row Name             Wound 11/07/23 1240 Right anterior foot    Wound - Properties Group Placement Date: 11/07/23  - Placement Time: 1240  -FH Side: Right  -FH Orientation: anterior  -FH Location: foot  -FH    Retired Wound - Properties Group Placement Date: 11/07/23  - Placement Time: 1240  -FH Side: Right  -FH Orientation: anterior  -FH Location: foot  -FH    Retired Wound - Properties Group Date first assessed: 11/07/23  - Time first assessed: 1240  -FH Side: Right  -FH Location: foot  -FH      Row Name 12/21/23 1211          Positioning and Restraints    Pre-Treatment Position in bed  -WM     Post Treatment Position bed  -WM     In Bed supine;call light within reach;with OT  -       Row Name 12/21/23 1211          Progress Summary (PT)    Progress Toward Functional Goals (PT) progress toward functional goals is gradual  -     Daily Progress Summary (PT) Pt participated in BLE exercises in supine. He was able to stand x 4 for ~ 5 to 10 seconds, but unable to take a step.  -               User Key  (r) = Recorded By, (t) = Taken By, (c) = Cosigned By      Initials Name Provider Type     Carlos Cagle RN Registered Nurse    Carson Johnson PTA Physical Therapist Assistant                  Section G              Section GG                       Physical Therapy Education       Title: PT OT SLP Therapies (Done)       Topic: Physical Therapy (Done)       Point: Mobility training (Done)       Learning Progress Summary             Patient Acceptance, E, VU by CR at 12/20/2023 1345    Acceptance, E, VU by CR at 12/19/2023 1004    Acceptance, E,TB, VU by RM at 11/30/2023 0420    Acceptance, E,TB, VU by MOE at 11/8/2023 1341                         Point: Precautions (Done)       Learning Progress Summary             Patient Acceptance, E,TB, VU by RM at 11/30/2023 0420    Acceptance, E,TB, VU by MOE at 11/8/2023 1341                                         User Key        Initials Effective Dates Name Provider Type Discipline     06/08/21 -  Bharath Berg RN Registered Nurse Nurse    CR 06/13/22 -  Adilson Baker LPN Licensed Nurse Nurse    MOE 06/03/21 -  Merlin Rao, PT Physical Therapist PT                  PT Recommendation and Plan     Progress Summary (PT)  Progress Toward Functional Goals (PT): progress toward functional goals is gradual  Daily Progress Summary (PT): Pt participated in BLE exercises in supine. He was able to stand x 4 for ~ 5 to 10 seconds, but unable to take a step.   Outcome Measures       Row Name 12/21/23 1225             How much help from another person do you currently need...    Turning from your back to your side while in flat bed without using bedrails? 3  -WM      Moving from lying on back to sitting on the side of a flat bed without bedrails? 2  -WM      Moving to and from a bed to a chair (including a wheelchair)? 1  -WM      Standing up from a chair using your arms (e.g., wheelchair, bedside chair)? 2  -WM      Climbing 3-5 steps with a railing? 1  -WM      To walk in hospital room? 1  -WM      AM-PAC 6 Clicks Score (PT) 10  -WM      Highest Level of Mobility Goal 4 --> Transfer to chair/commode  -WM                User Key  (r) = Recorded By, (t) = Taken By, (c) = Cosigned By      Initials Name Provider Type    Carson Johnson PTA Physical Therapist Assistant                     Time Calculation:    PT Charges       Row Name 12/21/23 1208             Time Calculation    PT Received On 12/21/23  -WM         Timed Charges    69376 - PT Therapeutic Exercise Minutes 17  -WM      40317 - PT Therapeutic Activity Minutes 24  -WM         SNF Physical Therapy Minutes    Skilled Minutes- PT 41 min  -WM         Total Minutes    Timed Charges Total Minutes 41  -WM       Total Minutes 41  -WM                User Key  (r) = Recorded By, (t) = Taken By, (c) = Cosigned By      Initials Name Provider Type    Carson Johnson PTA Physical Therapist  Assistant                      PT G-Codes  Outcome Measure Options: Optimal Instrument, AM-PAC 6 Clicks Daily Activity (OT)  AM-PAC 6 Clicks Score (PT): 10  AM-PAC 6 Clicks Score (OT): 16    Carson Inman, PTA  12/21/2023

## 2023-12-22 LAB
GLUCOSE BLDC GLUCOMTR-MCNC: 101 MG/DL (ref 70–99)
GLUCOSE BLDC GLUCOMTR-MCNC: 152 MG/DL (ref 70–99)
GLUCOSE BLDC GLUCOMTR-MCNC: 176 MG/DL (ref 70–99)
GLUCOSE BLDC GLUCOMTR-MCNC: 231 MG/DL (ref 70–99)
GLUCOSE BLDC GLUCOMTR-MCNC: 313 MG/DL (ref 70–99)
GLUCOSE BLDC GLUCOMTR-MCNC: 70 MG/DL (ref 70–99)
GLUCOSE BLDC GLUCOMTR-MCNC: 96 MG/DL (ref 70–99)

## 2023-12-22 PROCEDURE — 63710000001 INSULIN LISPRO (HUMAN) PER 5 UNITS: Performed by: INTERNAL MEDICINE

## 2023-12-22 PROCEDURE — 63710000001 INSULIN DETEMIR PER 5 UNITS: Performed by: PHYSICIAN ASSISTANT

## 2023-12-22 PROCEDURE — 97530 THERAPEUTIC ACTIVITIES: CPT

## 2023-12-22 PROCEDURE — 82948 REAGENT STRIP/BLOOD GLUCOSE: CPT

## 2023-12-22 PROCEDURE — 97110 THERAPEUTIC EXERCISES: CPT

## 2023-12-22 PROCEDURE — 97116 GAIT TRAINING THERAPY: CPT

## 2023-12-22 RX ADMIN — INSULIN LISPRO 4 UNITS: 100 INJECTION, SOLUTION INTRAVENOUS; SUBCUTANEOUS at 21:14

## 2023-12-22 RX ADMIN — PREGABALIN 100 MG: 100 CAPSULE ORAL at 08:01

## 2023-12-22 RX ADMIN — INSULIN DETEMIR 60 UNITS: 100 INJECTION, SOLUTION SUBCUTANEOUS at 21:13

## 2023-12-22 RX ADMIN — APIXABAN 5 MG: 5 TABLET, FILM COATED ORAL at 21:13

## 2023-12-22 RX ADMIN — INSULIN DETEMIR 70 UNITS: 100 INJECTION, SOLUTION SUBCUTANEOUS at 08:00

## 2023-12-22 RX ADMIN — PREGABALIN 100 MG: 100 CAPSULE ORAL at 21:13

## 2023-12-22 RX ADMIN — CYANOCOBALAMIN TAB 500 MCG 1000 MCG: 500 TAB at 08:00

## 2023-12-22 RX ADMIN — PREGABALIN 100 MG: 100 CAPSULE ORAL at 16:08

## 2023-12-22 RX ADMIN — BACLOFEN 10 MG: 10 TABLET ORAL at 00:19

## 2023-12-22 RX ADMIN — OXYCODONE AND ACETAMINOPHEN 1 TABLET: 7.5; 325 TABLET ORAL at 10:10

## 2023-12-22 RX ADMIN — FERROUS SULFATE TAB 325 MG (65 MG ELEMENTAL FE) 325 MG: 325 (65 FE) TAB at 08:01

## 2023-12-22 RX ADMIN — Medication 10 MG: at 21:12

## 2023-12-22 RX ADMIN — OXYCODONE AND ACETAMINOPHEN 1 TABLET: 7.5; 325 TABLET ORAL at 16:08

## 2023-12-22 RX ADMIN — BACLOFEN 10 MG: 10 TABLET ORAL at 19:05

## 2023-12-22 RX ADMIN — SERTRALINE HYDROCHLORIDE 25 MG: 25 TABLET ORAL at 21:15

## 2023-12-22 RX ADMIN — BACLOFEN 10 MG: 10 TABLET ORAL at 08:01

## 2023-12-22 RX ADMIN — INSULIN LISPRO 4 UNITS: 100 INJECTION, SOLUTION INTRAVENOUS; SUBCUTANEOUS at 12:18

## 2023-12-22 RX ADMIN — INSULIN LISPRO 16 UNITS: 100 INJECTION, SOLUTION INTRAVENOUS; SUBCUTANEOUS at 17:40

## 2023-12-22 RX ADMIN — OXYCODONE AND ACETAMINOPHEN 1 TABLET: 7.5; 325 TABLET ORAL at 22:35

## 2023-12-22 RX ADMIN — LISINOPRIL 2.5 MG: 2.5 TABLET ORAL at 08:00

## 2023-12-22 RX ADMIN — Medication 250 MG: at 08:01

## 2023-12-22 RX ADMIN — EMPAGLIFLOZIN 10 MG: 10 TABLET, FILM COATED ORAL at 08:01

## 2023-12-22 RX ADMIN — APIXABAN 5 MG: 5 TABLET, FILM COATED ORAL at 08:01

## 2023-12-22 RX ADMIN — Medication 250 MG: at 21:13

## 2023-12-22 RX ADMIN — ATORVASTATIN CALCIUM 10 MG: 10 TABLET, FILM COATED ORAL at 21:13

## 2023-12-22 RX ADMIN — OXYCODONE AND ACETAMINOPHEN 1 TABLET: 7.5; 325 TABLET ORAL at 02:59

## 2023-12-22 NOTE — PLAN OF CARE
Problem: Adult Inpatient Plan of Care  Goal: Plan of Care Review  Outcome: Ongoing, Progressing  Flowsheets (Taken 12/22/2023 1624)  Progress: no change  Plan of Care Reviewed With: patient  Outcome Evaluation: Patient is alert and oriented x4. Patient has had complaints of pain, see EMAR. Patient has no other complaints at this time.  Goal: Patient-Specific Goal (Individualized)  Outcome: Ongoing, Progressing  Goal: Absence of Hospital-Acquired Illness or Injury  Outcome: Ongoing, Progressing  Intervention: Identify and Manage Fall Risk  Recent Flowsheet Documentation  Taken 12/22/2023 1608 by Mae Francisco RN  Safety Promotion/Fall Prevention:   nonskid shoes/slippers when out of bed   safety round/check completed  Taken 12/22/2023 1455 by Mae Francisco RN  Safety Promotion/Fall Prevention:   nonskid shoes/slippers when out of bed   safety round/check completed  Taken 12/22/2023 1349 by Mae Francisco RN  Safety Promotion/Fall Prevention:   nonskid shoes/slippers when out of bed   safety round/check completed  Taken 12/22/2023 1218 by Mae Francisco RN  Safety Promotion/Fall Prevention:   nonskid shoes/slippers when out of bed   safety round/check completed  Taken 12/22/2023 1147 by Mae Francisco RN  Safety Promotion/Fall Prevention:   nonskid shoes/slippers when out of bed   safety round/check completed  Taken 12/22/2023 1010 by Mae Francisco RN  Safety Promotion/Fall Prevention:   nonskid shoes/slippers when out of bed   safety round/check completed  Taken 12/22/2023 0929 by Mae Francisco RN  Safety Promotion/Fall Prevention:   nonskid shoes/slippers when out of bed   safety round/check completed  Taken 12/22/2023 0852 by Mae Francisco RN  Safety Promotion/Fall Prevention:   nonskid shoes/slippers when out of bed   safety round/check completed  Taken 12/22/2023 0802 by Mae Francisco RN  Safety Promotion/Fall Prevention:   assistive device/personal items within reach   clutter free  environment maintained   fall prevention program maintained   lighting adjusted   nonskid shoes/slippers when out of bed   room organization consistent   safety round/check completed  Taken 12/22/2023 0720 by Mae Francisco RN  Safety Promotion/Fall Prevention:   nonskid shoes/slippers when out of bed   safety round/check completed  Intervention: Prevent Infection  Recent Flowsheet Documentation  Taken 12/22/2023 0802 by Mae Francisco RN  Infection Prevention:   cohorting utilized   equipment surfaces disinfected   environmental surveillance performed   hand hygiene promoted   personal protective equipment utilized   rest/sleep promoted   single patient room provided  Goal: Optimal Comfort and Wellbeing  Outcome: Ongoing, Progressing  Intervention: Monitor Pain and Promote Comfort  Recent Flowsheet Documentation  Taken 12/22/2023 1608 by Mae Francisco RN  Pain Management Interventions:   care clustered   pain management plan reviewed with patient/caregiver   quiet environment facilitated   see MAR  Taken 12/22/2023 1010 by Mae Francisco RN  Pain Management Interventions:   care clustered   pain management plan reviewed with patient/caregiver   quiet environment facilitated   see MAR  Taken 12/22/2023 0802 by Mae Francisco RN  Pain Management Interventions:   care clustered   pain management plan reviewed with patient/caregiver   quiet environment facilitated   see MAR  Intervention: Provide Person-Centered Care  Recent Flowsheet Documentation  Taken 12/22/2023 0802 by Mae Francisco RN  Trust Relationship/Rapport:   care explained   choices provided   emotional support provided   empathic listening provided   questions answered   questions encouraged   reassurance provided   thoughts/feelings acknowledged  Goal: Readiness for Transition of Care  Outcome: Ongoing, Progressing     Problem: Fall Injury Risk  Goal: Absence of Fall and Fall-Related Injury  Outcome: Ongoing, Progressing  Intervention:  Promote Injury-Free Environment  Recent Flowsheet Documentation  Taken 12/22/2023 1608 by Mae Francisco RN  Safety Promotion/Fall Prevention:   nonskid shoes/slippers when out of bed   safety round/check completed  Taken 12/22/2023 1455 by Mae Francisco RN  Safety Promotion/Fall Prevention:   nonskid shoes/slippers when out of bed   safety round/check completed  Taken 12/22/2023 1349 by Mae Francisco RN  Safety Promotion/Fall Prevention:   nonskid shoes/slippers when out of bed   safety round/check completed  Taken 12/22/2023 1218 by Mae Francisco RN  Safety Promotion/Fall Prevention:   nonskid shoes/slippers when out of bed   safety round/check completed  Taken 12/22/2023 1147 by Mae Francisco RN  Safety Promotion/Fall Prevention:   nonskid shoes/slippers when out of bed   safety round/check completed  Taken 12/22/2023 1010 by Mae Francisco RN  Safety Promotion/Fall Prevention:   nonskid shoes/slippers when out of bed   safety round/check completed  Taken 12/22/2023 0929 by Mae Francisco RN  Safety Promotion/Fall Prevention:   nonskid shoes/slippers when out of bed   safety round/check completed  Taken 12/22/2023 0852 by Mae Francisco RN  Safety Promotion/Fall Prevention:   nonskid shoes/slippers when out of bed   safety round/check completed  Taken 12/22/2023 0802 by Mae Francisco RN  Safety Promotion/Fall Prevention:   assistive device/personal items within reach   clutter free environment maintained   fall prevention program maintained   lighting adjusted   nonskid shoes/slippers when out of bed   room organization consistent   safety round/check completed  Taken 12/22/2023 0720 by Mae Francisco RN  Safety Promotion/Fall Prevention:   nonskid shoes/slippers when out of bed   safety round/check completed     Problem: Skin Injury Risk Increased  Goal: Skin Health and Integrity  Outcome: Ongoing, Progressing  Intervention: Optimize Skin Protection  Recent Flowsheet Documentation  Taken  12/22/2023 1608 by Mae Francisco RN  Head of Bed (HOB) Positioning: HOB elevated  Taken 12/22/2023 1455 by Mae Francisco RN  Head of Bed (HOB) Positioning: HOB elevated  Taken 12/22/2023 1349 by Mae Francisco RN  Head of Bed (HOB) Positioning: HOB elevated  Taken 12/22/2023 1218 by Mae Francisco RN  Head of Bed (HOB) Positioning: HOB elevated  Taken 12/22/2023 1147 by Mae Francisco RN  Head of Bed (HOB) Positioning: HOB elevated  Taken 12/22/2023 1010 by Mae Francisco RN  Head of Bed (HOB) Positioning: HOB elevated  Taken 12/22/2023 0929 by Mae Francisco RN  Head of Bed (HOB) Positioning: HOB elevated  Taken 12/22/2023 0852 by Mae Francisco RN  Head of Bed (HOB) Positioning: HOB elevated  Taken 12/22/2023 0720 by Mae Francisco RN  Head of Bed (HOB) Positioning: HOB elevated     Problem: Suicide Risk  Goal: Absence of Self-Harm  Outcome: Ongoing, Progressing     Problem: Diabetes Comorbidity  Goal: Blood Glucose Level Within Targeted Range  Outcome: Ongoing, Progressing     Problem: Hypertension Comorbidity  Goal: Blood Pressure in Desired Range  Outcome: Ongoing, Progressing     Problem: Impaired Wound Healing  Goal: Optimal Wound Healing  Outcome: Ongoing, Progressing  Intervention: Promote Wound Healing  Recent Flowsheet Documentation  Taken 12/22/2023 1608 by Mae Francisco RN  Pain Management Interventions:   care clustered   pain management plan reviewed with patient/caregiver   quiet environment facilitated   see MAR  Taken 12/22/2023 1010 by Mae Francisco RN  Pain Management Interventions:   care clustered   pain management plan reviewed with patient/caregiver   quiet environment facilitated   see MAR  Taken 12/22/2023 0802 by Mae Francisco RN  Pain Management Interventions:   care clustered   pain management plan reviewed with patient/caregiver   quiet environment facilitated   see MAR   Goal Outcome Evaluation:  Plan of Care Reviewed With: patient        Progress: no  change  Outcome Evaluation: Patient is alert and oriented x4. Patient has had complaints of pain, see EMAR. Patient has no other complaints at this time.

## 2023-12-22 NOTE — THERAPY TREATMENT NOTE
SNF - Occupational Therapy Treatment Note  KEO Dominguez    Patient Name: Jose Shaikh  : 1960    MRN: [id number]                              Today's Date: 2023       Admit Date: 2023    Visit Dx:     ICD-10-CM ICD-9-CM   1. Difficulty in walking  R26.2 719.7     Patient Active Problem List   Diagnosis    Diabetic ulcer of left heel associated with type 2 DM    Acute osteomyelitis of left calcaneus     Diabetic ulcer of left heel associated with type 2 DM    Diabetic ulcer of right midfoot associated with type 2 DM    Paroxysmal atrial fibrillation    Essential hypertension    Hyperlipidemia LDL goal <100    Cellulitis and abscess of foot    High alkaline phosphatase level    Osteomyelitis    Onychomycosis    Onychocryptosis    Foot pain, bilateral    Osteomyelitis of foot, right, acute    Cellulitis of right foot    Type 2 diabetes mellitus, with long-term current use of insulin    Class 3 severe obesity due to excess calories with serious comorbidity and body mass index (BMI) of 45.0 to 49.9 in adult    Anxiety disorder, unspecified    Claustrophobia    Dependence on wheelchair    Depression, unspecified    Long term (current) use of anticoagulants    Long term (current) use of oral hypoglycemic drugs    Wound of foot    Non-prs chronic ulcer oth prt r foot limited to brkdwn skin    Orthostatic hypotension    Other chronic osteomyelitis, right ankle and foot    Personal history of nicotine dependence    Thrombocytopenia, unspecified    Unspecified open wound, right foot, initial encounter    Diabetic foot infection    Subacute osteomyelitis of right foot    Right foot pain    Sepsis    Onychomycosis    Foot pain, left    Impaired mobility and ADLs    Absence of toe of right foot    Corns and callosity    Disability of walking    Fracture    Limb swelling    Polyneuropathy    Pressure ulcer, stage 1    Shortness of breath    Generalized weakness    Debility     Past Medical History:   Diagnosis  Date    Absence of toe of right foot     Acute osteomyelitis of left calcaneus  08/18/2021    Anxiety and depression     Arthritis     Cancer     Claustrophobia     Corns and callus     Diabetic ulcer of left heel associated with type 2 DM 08/18/2021    Diabetic ulcer of left heel associated with type 2 DM 07/06/2021    Diabetic ulcer of right midfoot associated with type 2 DM 08/18/2021    Difficulty walking     Essential hypertension 08/31/2021    Hammertoe     Hyperlipidemia LDL goal <100 08/31/2021    Ingrown toenail     Obesity     Paroxysmal atrial fibrillation 08/31/2021    Polyneuropathy     Pressure ulcer, stage 1     Tinea unguium     Type 2 diabetes mellitus with polyneuropathy      Past Surgical History:   Procedure Laterality Date    CYST REMOVAL      center of back; benign    EYE SURGERY      INCISION AND DRAINAGE ABSCESS      back    INCISION AND DRAINAGE LEG Right 12/10/2021    Procedure: INCISION AND DRAINAGE LOWER EXTREMITY;  Surgeon: Ash Leyva DPM;  Location: [facility];  Service: Podiatry;  Laterality: Right;    OTHER SURGICAL HISTORY      Surgical clips left foot    TOE SURGERY Right     Removal of 5th toe    TRANS METATARSAL AMPUTATION Right 12/02/2021    Procedure: AMPUTATION TRANS METATARSAL;  Surgeon: Ash Leyva DPM;  Location: [facility] OR;  Service: Podiatry;  Laterality: Right;    VASCULAR SURGERY      WRIST SURGERY Left     repair of injury      General Information       Row Name 12/22/23 1038          OT Time and Intention    Document Type therapy note (daily note)  -PG     Mode of Treatment individual therapy;occupational therapy  -PG               User Key  (r) = Recorded By, (t) = Taken By, (c) = Cosigned By      Initials Name Provider Type    PG Kodak Matos OT Occupational Therapist                     Mobility/ADL's       Row Name 12/22/23 1038          Sit-Stand Transfer    Sit-Stand [place] (Transfers) moderate assist (50% patient  effort);2 person assist  -PG     Assistive Device (Sit-Stand Transfers) bariatric;walker, front-wheeled  -PG       Row Name 12/22/23 1038          Stand-Sit Transfer    Stand-Sit Daniels (Transfers) moderate assist (50% patient effort);2 person assist  -PG     Assistive Device (Stand-Sit Transfers) bariatric;walker, front-wheeled  -PG               User Key  (r) = Recorded By, (t) = Taken By, (c) = Cosigned By      Initials Name Provider Type    Kodak Velazco OT Occupational Therapist                   Obj/Interventions    No documentation.                  Goals/Plan    No documentation.                  Clinical Impression       Row Name 12/22/23 1038          Plan of Care Review    Progress no change  -PG               User Key  (r) = Recorded By, (t) = Taken By, (c) = Cosigned By      Initials Name Provider Type    Kodak Velazco OT Occupational Therapist                   Outcome Measures       Row Name 12/22/23 1017 12/22/23 0802       How much help from another person do you currently need...    Turning from your back to your side while in flat bed without using bedrails? 3  -WM 3  -DD    Moving from lying on back to sitting on the side of a flat bed without bedrails? 2  -WM 2  -DD    Moving to and from a bed to a chair (including a wheelchair)? 1  -WM 1  -DD    Standing up from a chair using your arms (e.g., wheelchair, bedside chair)? 2  -WM 2  -DD    Climbing 3-5 steps with a railing? 1  -WM 1  -DD    To walk in hospital room? 1  -WM 1  -DD    AM-PAC 6 Clicks Score (PT) 10  -WM 10  -DD    Highest Level of Mobility Goal 4 --> Transfer to chair/commode  -WM 4 --> Transfer to chair/commode  -DD      Row Name 12/22/23 0329          How much help from another person do you currently need...    Turning from your back to your side while in flat bed without using bedrails? 3  -FM     Moving from lying on back to sitting on the side of a flat bed without bedrails? 2  -FM     Moving to and from a bed to  a chair (including a wheelchair)? 1  -FM     Standing up from a chair using your arms (e.g., wheelchair, bedside chair)? 2  -FM     Climbing 3-5 steps with a railing? 1  -FM     To walk in hospital room? 1  -FM     AM-PAC 6 Clicks Score (PT) 10  -FM     Highest Level of Mobility Goal 4 --> Transfer to chair/commode  -FM               User Key  (r) = Recorded By, (t) = Taken By, (c) = Cosigned By      Initials Name Provider Type    FM Patsy Cano, RN Registered Nurse    Carson Johnson PTA Physical Therapist Assistant    Mae Dodd RN Registered Nurse                  Section G              Section GG  SectionGG: Functional Ability/Goals, Adm  Self Care, Prior Functioning (ZN1299P): 1. Dependent  Upper Extremity Range of Motion (JA3342P): No impairment  Self Care, Admission (Section GG)  Eating: Self-Care Admission Performance (VI6983J1): independent (06)  Oral Hygiene: Self-Care Admission Performance (NT9809C4): independent (06)  Toileting Hygiene: Self-Care Admission Performance (HQ1001L7): dependent (01)  Shower/Bathe Self: Self-Care Admission Performance (UH8521O6): substantial/maximal assistance (02)  Upper Body Dressing: Self-Care Admission Performance (LN6886Z8): partial/moderate assistance (03)  Lower Body Dressing: Self-Care Admission Performance (GW8505V1): dependent (01)  Putting On/Taking Off Footwear: Self-Care Admission Performance (YC5940U0): dependent (01)  Personal Hygiene: Self-Care Admission Performance (NL2662S1): dependent (01)  Mobility, Admission Performance (ZP8108)  Toilet Transfer: Mobility Admission Performance (SC6338M7): not attempted, medical condition/safety concern (88)  Section GG: Functional Ability/Goals, DC  Eating: Self-Care Discharge Goal (XF5790D8): independent (06)  Oral Hygiene: Self-Care Discharge Goal (FL1576B2): independent (06)  Shower/Bathe Self: Self-Care Discharge Goal (IL4599Q8): supervision or touching assistance (04)  Upper Body Dressing: Self-Care  Discharge Goal (SQ1933N0): setup or clean-up assistance (05)  Lower Body Dressing: Self-Care Discharge Goal (JN5747Q4): supervision or touching assistance (04)  Putting On/Taking Off Footwear: Self-Care Discharge Goal (UB0597J2): supervision or touching assistance (04)  Personal Hygiene: Self-Care Discharge Goal (EQ0286M8): supervision or touching assistance (04)  Mobility (GG), Discharge Goal (US6201)  Toilet Transfer: Mobility Discharge Goal (PR0771I7): supervision or touching assistance (04)          Occupational Therapy Education       Title: PT OT SLP Therapies (Done)       Topic: Occupational Therapy (Done)       Point: ADL training (Done)       Description:   Instruct learner(s) on proper safety adaptation and remediation techniques during self care or transfers.   Instruct in proper use of assistive devices.                  Learning Progress Summary             Patient Acceptance, E,TB, VU by RM at 11/30/2023 0420    Acceptance, E,D, DU by PG at 11/7/2023 0932                         Point: Home exercise program (Done)       Description:   Instruct learner(s) on appropriate technique for monitoring, assisting and/or progressing therapeutic exercises/activities.                  Learning Progress Summary             Patient Acceptance, E,TB, VU by RM at 11/30/2023 0420    Acceptance, E,D, DU by PG at 11/7/2023 0932                         Point: Precautions (Done)       Description:   Instruct learner(s) on prescribed precautions during self-care and functional transfers.                  Learning Progress Summary             Patient Acceptance, E,TB, VU by RM at 11/30/2023 0420    Acceptance, E,D, DU by PG at 11/7/2023 0932                         Point: Body mechanics (Done)       Description:   Instruct learner(s) on proper positioning and spine alignment during self-care, functional mobility activities and/or exercises.                  Learning Progress Summary             Patient Acceptance, E,TB, VU  by  at 11/30/2023 0420    Acceptance, E,D, DU by PG at 11/7/2023 0932                                         User Key       Initials Effective Dates Name Provider Type Discipline     06/08/21 -  Bharath Berg, RN Registered Nurse Nurse    PG 06/16/21 -  Kodak Matos OT Occupational Therapist OT                  OT Recommendation and Plan  Planned Therapy Interventions (OT): activity tolerance training, BADL retraining, strengthening exercise, transfer/mobility retraining, patient/caregiver education/training, occupation/activity based interventions  Therapy Frequency (OT): 5 times/wk  Plan of Care Review  Plan of Care Reviewed With: patient  Progress: no change  Outcome Evaluation: Patient participated in upper body strengthening exercises utilizing 6 pound weights, 2 sets x 15 repetitions.  Patient stood mod assist x 2 and able to advance feet 3 inches with assist to advance right foot forward and 2 others to push bed.  Continue OT services per plan of care     Time Calculation:   Evaluation Complexity (OT)  Review Occupational Profile/Medical/Therapy History Complexity: brief/low complexity  Assessment, Occupational Performance/Identification of Deficit Complexity: 3-5 performance deficits  Clinical Decision Making Complexity (OT): problem focused assessment/low complexity  Overall Complexity of Evaluation (OT): low complexity     Time Calculation- OT       Row Name 12/22/23 1038 12/22/23 1009          Time Calculation- OT    OT Received On 12/22/23  -PG --     OT Goal Re-Cert Due Date 12/27/23  -PG --        Timed Charges    23597 - Gait Training Minutes  -- 7  -WM     19249 - OT Therapeutic Activity Minutes 25  -PG --        SNF Occupational Therapy Minutes    Skilled Minutes- OT 25 min  -PG --        Total Minutes    Timed Charges Total Minutes 25  -PG 7  -WM      Total Minutes 25  -PG 7  -WM               User Key  (r) = Recorded By, (t) = Taken By, (c) = Cosigned By      Initials Name Provider Type     Carson Johnson, PTA Physical Therapist Assistant    PG Kodak Matos OT Occupational Therapist                  Therapy Charges for Today       Code Description Service Date Service Provider Modifiers Qty    21692833155 HC OT THER PROC EA 15 MIN 12/21/2023 Kodak Matos OT GO 1    18599278116 HC OT THERAPEUTIC ACT EA 15 MIN 12/21/2023 Kodak Matos OT GO 1    62300295059 HC OT THERAPEUTIC ACT EA 15 MIN 12/22/2023 Kodak Matos OT GO 2                 Kodak Matos OT  12/22/2023

## 2023-12-22 NOTE — THERAPY TREATMENT NOTE
SNF - Physical Therapy Treatment Note  KEO Dominguez     Patient Name: Jose Shaikh  : 1960  MRN: [id number]  Today's Date: 2023      Visit Dx:    ICD-10-CM ICD-9-CM   1. Difficulty in walking  R26.2 719.7     Patient Active Problem List   Diagnosis    Diabetic ulcer of left heel associated with type 2 DM    Acute osteomyelitis of left calcaneus     Diabetic ulcer of left heel associated with type 2 DM    Diabetic ulcer of right midfoot associated with type 2 DM    Paroxysmal atrial fibrillation    Essential hypertension    Hyperlipidemia LDL goal <100    Cellulitis and abscess of foot    High alkaline phosphatase level    Osteomyelitis    Onychomycosis    Onychocryptosis    Foot pain, bilateral    Osteomyelitis of foot, right, acute    Cellulitis of right foot    Type 2 diabetes mellitus, with long-term current use of insulin    Class 3 severe obesity due to excess calories with serious comorbidity and body mass index (BMI) of 45.0 to 49.9 in adult    Anxiety disorder, unspecified    Claustrophobia    Dependence on wheelchair    Depression, unspecified    Long term (current) use of anticoagulants    Long term (current) use of oral hypoglycemic drugs    Wound of foot    Non-prs chronic ulcer oth prt r foot limited to brkdwn skin    Orthostatic hypotension    Other chronic osteomyelitis, right ankle and foot    Personal history of nicotine dependence    Thrombocytopenia, unspecified    Unspecified open wound, right foot, initial encounter    Diabetic foot infection    Subacute osteomyelitis of right foot    Right foot pain    Sepsis    Onychomycosis    Foot pain, left    Impaired mobility and ADLs    Absence of toe of right foot    Corns and callosity    Disability of walking    Fracture    Limb swelling    Polyneuropathy    Pressure ulcer, stage 1    Shortness of breath    Generalized weakness    Debility     Past Medical History:   Diagnosis Date    Absence of toe of right foot     Acute osteomyelitis  of left calcaneus  08/18/2021    Anxiety and depression     Arthritis     Cancer     Claustrophobia     Corns and callus     Diabetic ulcer of left heel associated with type 2 DM 08/18/2021    Diabetic ulcer of left heel associated with type 2 DM 07/06/2021    Diabetic ulcer of right midfoot associated with type 2 DM 08/18/2021    Difficulty walking     Essential hypertension 08/31/2021    Hammertoe     Hyperlipidemia LDL goal <100 08/31/2021    Ingrown toenail     Obesity     Paroxysmal atrial fibrillation 08/31/2021    Polyneuropathy     Pressure ulcer, stage 1     Tinea unguium     Type 2 diabetes mellitus with polyneuropathy      Past Surgical History:   Procedure Laterality Date    CYST REMOVAL      center of back; benign    EYE SURGERY      INCISION AND DRAINAGE ABSCESS      back    INCISION AND DRAINAGE LEG Right 12/10/2021    Procedure: INCISION AND DRAINAGE LOWER EXTREMITY;  Surgeon: Ash Leyva DPM;  Location: [facility];  Service: Podiatry;  Laterality: Right;    OTHER SURGICAL HISTORY      Surgical clips left foot    TOE SURGERY Right     Removal of 5th toe    TRANS METATARSAL AMPUTATION Right 12/02/2021    Procedure: AMPUTATION TRANS METATARSAL;  Surgeon: Ash Leyva DPM;  Location: [facility];  Service: Podiatry;  Laterality: Right;    VASCULAR SURGERY      WRIST SURGERY Left     repair of injury       PT Assessment (last 12 hours)       PT Evaluation and Treatment       Row Name 12/22/23 1010          Physical Therapy Time and Intention    Document Type therapy note (daily note)  -WM     Mode of Treatment individual therapy;physical therapy  -WM     Patient Effort adequate  -WM     Symptoms Noted During/After Treatment fatigue  -WM       Row Name 12/22/23 1010          Pain    Pretreatment Pain Rating 7/10  -WM     Posttreatment Pain Rating 10/10  -WM     Pain Location - Side/Orientation Left  -WM     Pain Location - hip;knee  -WM     Pain Intervention(s)  Repositioned;Emotional support;Rest  -       Row Name 12/22/23 1010          Bed Mobility    Supine-Sit-Supine Coffey (Bed Mobility) minimum assist (75% patient effort);verbal cues;1 person assist  -     Bed Mobility, Safety Issues decreased use of legs for bridging/pushing  -     Assistive Device (Bed Mobility) bed rails;overhead trapeze  -       Row Name 12/22/23 1010          Sit-Stand Transfer    Sit-Stand [place] (Transfers) moderate assist (50% patient effort);2 person assist  -     Assistive Device (Sit-Stand Transfers) bariatric;walker, front-wheeled  -WM       Row Name 12/22/23 1010          Stand-Sit Transfer    Stand-Sit [place] (Transfers) moderate assist (50% patient effort);2 person assist  -     Assistive Device (Stand-Sit Transfers) bariatric;walker, front-wheeled  -WM       Row Name 12/22/23 1010          Gait/Stairs (Locomotion)    Coffey Level (Gait) moderate assist (50% patient effort);2 person assist;verbal cues  -     Assistive Device (Gait) bariatric equipment;walker, front-wheeled  -     Distance in Feet (Gait) 1  -WM     Pattern (Gait) 4-point;step-to  -WM     Deviations/Abnormal Patterns (Gait) gait speed decreased;stride length decreased  -WM     Comment, (Gait/Stairs) Pt ambulated with left knee immobilizer  -       Row Name 12/22/23 1010          Safety Issues, Functional Mobility    Impairments Affecting Function (Mobility) balance;endurance/activity tolerance;pain;range of motion (ROM);strength  -       Row Name 12/22/23 1010          Hip (Therapeutic Exercise)    Hip AAROM (Therapeutic Exercise) bilateral;aBduction;aDduction;supine;10 repetitions;2 sets  -     Hip Isometrics (Therapeutic Exercise) bilateral;gluteal sets;supine;10 repetitions;3 second hold;2 sets  -     Hip Strengthening (Therapeutic Exercise) bilateral;heel slides;supine;20 repititions  Manual resistance  -       Row Name 12/22/23 1010          Knee (Therapeutic  Exercise)    Knee Isometrics (Therapeutic Exercise) bilateral;quad sets;supine;10 repetitions;3 second hold;2 sets  -     Knee Strengthening (Therapeutic Exercise) bilateral;SAQ (short arc quad);supine;4 lb free weight;20 repititions;SLR (straight leg raise)  SLR active-assisted  -       Row Name 12/22/23 1010          Ankle (Therapeutic Exercise)    Ankle AROM (Therapeutic Exercise) bilateral;dorsiflexion;plantarflexion;supine;20 repititions  -       Row Name             Wound 11/07/23 1240 Right anterior foot    Wound - Properties Group Placement Date: 11/07/23  - Placement Time: 1240  -FH Side: Right  -FH Orientation: anterior  -FH Location: foot  -FH    Retired Wound - Properties Group Placement Date: 11/07/23  - Placement Time: 1240  -FH Side: Right  -FH Orientation: anterior  -FH Location: foot  -FH    Retired Wound - Properties Group Date first assessed: 11/07/23  - Time first assessed: 1240  -FH Side: Right  -FH Location: foot  -FH      Row Name 12/22/23 1010          Positioning and Restraints    Pre-Treatment Position in bed  -WM     Post Treatment Position bed  -WM     In Bed supine;call light within reach;encouraged to call for assist  -       Row Name 12/22/23 1010          Progress Summary (PT)    Progress Toward Functional Goals (PT) progress toward functional goals is gradual  -               User Key  (r) = Recorded By, (t) = Taken By, (c) = Cosigned By      Initials Name Provider Type     Carlos Cagle RN Registered Nurse    Carson Johnson PTA Physical Therapist Assistant                  Section G              Section GG                       Physical Therapy Education       Title: PT OT SLP Therapies (Done)       Topic: Physical Therapy (Done)       Point: Mobility training (Done)       Learning Progress Summary             Patient Acceptance, E, VU by CR at 12/20/2023 1345    Acceptance, E, VU by CR at 12/19/2023 1004    Acceptance, E,TB, VU by RM at 11/30/2023 0429     Acceptance, E,TB, VU by MOE at 11/8/2023 1341                         Point: Precautions (Done)       Learning Progress Summary             Patient Acceptance, E,TB, VU by  at 11/30/2023 0420    Acceptance, E,TB, VU by MOE at 11/8/2023 1341                                         User Key       Initials Effective Dates Name Provider Type Discipline     06/08/21 -  Bharath Berg RN Registered Nurse Nurse    ROLY 06/13/22 -  Adilson Baker LPN Licensed Nurse Nurse    MOE 06/03/21 -  Merlin Rao, PT Physical Therapist PT                  PT Recommendation and Plan     Progress Summary (PT)  Progress Toward Functional Goals (PT): progress toward functional goals is gradual  Daily Progress Summary (PT): Pt participated in BLE exercises in supine. He was able to stand x 4 for ~ 5 to 10 seconds, but unable to take a step.   Outcome Measures       Row Name 12/22/23 1017 12/21/23 1225          How much help from another person do you currently need...    Turning from your back to your side while in flat bed without using bedrails? 3  -WM 3  -WM     Moving from lying on back to sitting on the side of a flat bed without bedrails? 2  -WM 2  -WM     Moving to and from a bed to a chair (including a wheelchair)? 1  -WM 1  -WM     Standing up from a chair using your arms (e.g., wheelchair, bedside chair)? 2  -WM 2  -WM     Climbing 3-5 steps with a railing? 1  -WM 1  -WM     To walk in hospital room? 1  -WM 1  -WM     AM-PAC 6 Clicks Score (PT) 10  -WM 10  -WM     Highest Level of Mobility Goal 4 --> Transfer to chair/commode  -WM 4 --> Transfer to chair/commode  -WM               User Key  (r) = Recorded By, (t) = Taken By, (c) = Cosigned By      Initials Name Provider Type    WM Carson Inman PTA Physical Therapist Assistant                     Time Calculation:    PT Charges       Row Name 12/22/23 1009             Time Calculation    PT Received On 12/22/23  -WM         Timed Charges    93318 - PT Therapeutic  Exercise Minutes 16  -WM      90967 - Gait Training Minutes  7  -WM      35642 - PT Therapeutic Activity Minutes 20  -WM         SNF Physical Therapy Minutes    Skilled Minutes- PT 43 min  -WM         Total Minutes    Timed Charges Total Minutes 43  -WM       Total Minutes 43  -WM                User Key  (r) = Recorded By, (t) = Taken By, (c) = Cosigned By      Initials Name Provider Type     Carson Inman PTA Physical Therapist Assistant                  Therapy Charges for Today       Code Description Service Date Service Provider Modifiers Qty    02193477106  PT THER PROC EA 15 MIN 12/21/2023 Carson Inman PTA GP 1    [id number]  PT THERAPEUTIC ACT EA 15 MIN 12/21/2023 Carson Inman PTA GP 2            PT G-Codes  Outcome Measure Options: AM-PAC 6 Clicks Daily Activity (OT), Optimal Instrument  AM-PAC 6 Clicks Score (PT): 10  AM-PAC 6 Clicks Score (OT): 14    Carson Inman PTA  12/22/2023

## 2023-12-23 LAB
GLUCOSE BLDC GLUCOMTR-MCNC: 121 MG/DL (ref 70–99)
GLUCOSE BLDC GLUCOMTR-MCNC: 147 MG/DL (ref 70–99)
GLUCOSE BLDC GLUCOMTR-MCNC: 248 MG/DL (ref 70–99)
GLUCOSE BLDC GLUCOMTR-MCNC: 93 MG/DL (ref 70–99)

## 2023-12-23 PROCEDURE — 63710000001 INSULIN DETEMIR PER 5 UNITS: Performed by: PHYSICIAN ASSISTANT

## 2023-12-23 PROCEDURE — 82948 REAGENT STRIP/BLOOD GLUCOSE: CPT

## 2023-12-23 PROCEDURE — 63710000001 INSULIN LISPRO (HUMAN) PER 5 UNITS: Performed by: INTERNAL MEDICINE

## 2023-12-23 PROCEDURE — 97110 THERAPEUTIC EXERCISES: CPT

## 2023-12-23 RX ADMIN — PREGABALIN 100 MG: 100 CAPSULE ORAL at 09:09

## 2023-12-23 RX ADMIN — INSULIN DETEMIR 70 UNITS: 100 INJECTION, SOLUTION SUBCUTANEOUS at 09:09

## 2023-12-23 RX ADMIN — FERROUS SULFATE TAB 325 MG (65 MG ELEMENTAL FE) 325 MG: 325 (65 FE) TAB at 09:09

## 2023-12-23 RX ADMIN — SERTRALINE HYDROCHLORIDE 25 MG: 25 TABLET ORAL at 20:17

## 2023-12-23 RX ADMIN — OXYCODONE AND ACETAMINOPHEN 1 TABLET: 7.5; 325 TABLET ORAL at 12:23

## 2023-12-23 RX ADMIN — INSULIN DETEMIR 60 UNITS: 100 INJECTION, SOLUTION SUBCUTANEOUS at 20:14

## 2023-12-23 RX ADMIN — Medication 250 MG: at 09:08

## 2023-12-23 RX ADMIN — ATORVASTATIN CALCIUM 10 MG: 10 TABLET, FILM COATED ORAL at 20:15

## 2023-12-23 RX ADMIN — OXYCODONE AND ACETAMINOPHEN 1 TABLET: 7.5; 325 TABLET ORAL at 04:30

## 2023-12-23 RX ADMIN — Medication 10 MG: at 20:15

## 2023-12-23 RX ADMIN — BACLOFEN 10 MG: 10 TABLET ORAL at 23:08

## 2023-12-23 RX ADMIN — IBUPROFEN 400 MG: 400 TABLET ORAL at 16:13

## 2023-12-23 RX ADMIN — INSULIN LISPRO 8 UNITS: 100 INJECTION, SOLUTION INTRAVENOUS; SUBCUTANEOUS at 20:14

## 2023-12-23 RX ADMIN — OXYCODONE AND ACETAMINOPHEN 1 TABLET: 7.5; 325 TABLET ORAL at 18:41

## 2023-12-23 RX ADMIN — APIXABAN 5 MG: 5 TABLET, FILM COATED ORAL at 09:09

## 2023-12-23 RX ADMIN — CYANOCOBALAMIN TAB 500 MCG 1000 MCG: 500 TAB at 09:08

## 2023-12-23 RX ADMIN — EMPAGLIFLOZIN 10 MG: 10 TABLET, FILM COATED ORAL at 09:09

## 2023-12-23 RX ADMIN — Medication 250 MG: at 20:15

## 2023-12-23 RX ADMIN — PREGABALIN 100 MG: 100 CAPSULE ORAL at 20:15

## 2023-12-23 RX ADMIN — LISINOPRIL 2.5 MG: 2.5 TABLET ORAL at 09:09

## 2023-12-23 RX ADMIN — PREGABALIN 100 MG: 100 CAPSULE ORAL at 15:27

## 2023-12-23 RX ADMIN — APIXABAN 5 MG: 5 TABLET, FILM COATED ORAL at 20:15

## 2023-12-23 RX ADMIN — BACLOFEN 10 MG: 10 TABLET ORAL at 09:23

## 2023-12-23 NOTE — PLAN OF CARE
Goal Outcome Evaluation:              Outcome Evaluation: Plan of care continues at this time. See eMAR for medication administration details. Patient uses bedpan and urinal, stool incontinence at times. Patient is able to make needs known. Call light in reach.

## 2023-12-23 NOTE — THERAPY TREATMENT NOTE
SNF - Physical Therapy Progress Note  [facility]     Patient Name: Jose Shaikh  : 1960  MRN: [id number]  Today's Date: 2023      Visit Dx:    ICD-10-CM ICD-9-CM   1. Difficulty in walking  R26.2 719.7     Patient Active Problem List   Diagnosis    Diabetic ulcer of left heel associated with type 2 DM    Acute osteomyelitis of left calcaneus     Diabetic ulcer of left heel associated with type 2 DM    Diabetic ulcer of right midfoot associated with type 2 DM    Paroxysmal atrial fibrillation    Essential hypertension    Hyperlipidemia LDL goal <100    Cellulitis and abscess of foot    High alkaline phosphatase level    Osteomyelitis    Onychomycosis    Onychocryptosis    Foot pain, bilateral    Osteomyelitis of foot, right, acute    Cellulitis of right foot    Type 2 diabetes mellitus, with long-term current use of insulin    Class 3 severe obesity due to excess calories with serious comorbidity and body mass index (BMI) of 45.0 to 49.9 in adult    Anxiety disorder, unspecified    Claustrophobia    Dependence on wheelchair    Depression, unspecified    Long term (current) use of anticoagulants    Long term (current) use of oral hypoglycemic drugs    Wound of foot    Non-prs chronic ulcer oth prt r foot limited to brkdwn skin    Orthostatic hypotension    Other chronic osteomyelitis, right ankle and foot    Personal history of nicotine dependence    Thrombocytopenia, unspecified    Unspecified open wound, right foot, initial encounter    Diabetic foot infection    Subacute osteomyelitis of right foot    Right foot pain    Sepsis    Onychomycosis    Foot pain, left    Impaired mobility and ADLs    Absence of toe of right foot    Corns and callosity    Disability of walking    Fracture    Limb swelling    Polyneuropathy    Pressure ulcer, stage 1    Shortness of breath    Generalized weakness    Debility     Past Medical History:   Diagnosis Date    Absence of toe of right foot     Acute osteomyelitis of  left calcaneus  08/18/2021    Anxiety and depression     Arthritis     Cancer     Claustrophobia     Corns and callus     Diabetic ulcer of left heel associated with type 2 DM 08/18/2021    Diabetic ulcer of left heel associated with type 2 DM 07/06/2021    Diabetic ulcer of right midfoot associated with type 2 DM 08/18/2021    Difficulty walking     Essential hypertension 08/31/2021    Hammertoe     Hyperlipidemia LDL goal <100 08/31/2021    Ingrown toenail     Obesity     Paroxysmal atrial fibrillation 08/31/2021    Polyneuropathy     Pressure ulcer, stage 1     Tinea unguium     Type 2 diabetes mellitus with polyneuropathy      Past Surgical History:   Procedure Laterality Date    CYST REMOVAL      center of back; benign    EYE SURGERY      INCISION AND DRAINAGE ABSCESS      back    INCISION AND DRAINAGE LEG Right 12/10/2021    Procedure: INCISION AND DRAINAGE LOWER EXTREMITY;  Surgeon: Ash Leyva DPM;  Location: [facility];  Service: Podiatry;  Laterality: Right;    OTHER SURGICAL HISTORY      Surgical clips left foot    TOE SURGERY Right     Removal of 5th toe    TRANS METATARSAL AMPUTATION Right 12/02/2021    Procedure: AMPUTATION TRANS METATARSAL;  Surgeon: Ash Leyva DPM;  Location: [facility];  Service: Podiatry;  Laterality: Right;    VASCULAR SURGERY      WRIST SURGERY Left     repair of injury       PT Assessment (last 12 hours)       PT Evaluation and Treatment       Row Name 12/23/23 1600          Physical Therapy Time and Intention    Subjective Information complains of;pain  -CS     Document Type therapy note (daily note)  -CS     Mode of Treatment individual therapy;physical therapy  -CS     Patient Effort adequate  -CS     Symptoms Noted During/After Treatment increased pain  -CS       Row Name 12/23/23 1600          Pain    Pretreatment Pain Rating 5/10  -CS     Posttreatment Pain Rating 8/10  -CS     Pain Location - Side/Orientation Left  -CS     Pain Location -  hip;knee  -     Pain Intervention(s) Repositioned;Distraction;Rest  -CS       Row Name 12/23/23 1600          Hip (Therapeutic Exercise)    Hip AROM (Therapeutic Exercise) bilateral;external rotation;internal rotation;supine;30 repititions  -     Hip AAROM (Therapeutic Exercise) bilateral;supine;10 repetitions;3 sets;aBduction;aDduction  -     Hip Isometrics (Therapeutic Exercise) bilateral;gluteal sets;supine;10 repetitions;3 second hold;3 sets  -       Row Name 12/23/23 1600          Knee (Therapeutic Exercise)    Knee AAROM (Therapeutic Exercise) bilateral;supine;10 repetitions;3 sets  SLR's  -     Knee Isometrics (Therapeutic Exercise) bilateral;quad sets;supine;10 repetitions;3 sets;3 second hold  -     Knee Strengthening (Therapeutic Exercise) bilateral;SAQ (short arc quad);5 lb free weight;30 repititions  -       Row Name 12/23/23 1600          Ankle (Therapeutic Exercise)    Ankle AROM (Therapeutic Exercise) bilateral;dorsiflexion;plantarflexion;supine;30 repititions  -       Row Name             Wound 11/07/23 1240 Right anterior foot    Wound - Properties Group Placement Date: 11/07/23  - Placement Time: 1240  -FH Side: Right  -FH Orientation: anterior  -FH Location: foot  -FH    Retired Wound - Properties Group Placement Date: 11/07/23  - Placement Time: 1240  -FH Side: Right  -FH Orientation: anterior  -FH Location: foot  -FH    Retired Wound - Properties Group Date first assessed: 11/07/23  - Time first assessed: 1240  -FH Side: Right  -FH Location: foot  -FH      Row Name 12/23/23 1600          Positioning and Restraints    Pre-Treatment Position in bed  -CS     Post Treatment Position bed  -CS     In Bed fowlers;call light within reach;encouraged to call for assist  -       Row Name 12/23/23 1600          Progress Summary (PT)    Progress Toward Functional Goals (PT) progress toward functional goals is fair  -CS               User Key  (r) = Recorded By, (t) = Taken By, (c) =  Cosigned By      Initials Name Provider Type     Carlos Cagle, RN Registered Nurse    Ronald Huerta PTA Physical Therapist Assistant                  Section G              Section GG                       Physical Therapy Education       Title: PT OT SLP Therapies (Done)       Topic: Physical Therapy (Done)       Point: Mobility training (Done)       Learning Progress Summary             Patient Acceptance, E, VU by CR at 12/20/2023 1345    Acceptance, E, VU by CR at 12/19/2023 1004    Acceptance, E,TB, VU by  at 11/30/2023 0420    Acceptance, E,TB, VU by MOE at 11/8/2023 1341                         Point: Precautions (Done)       Learning Progress Summary             Patient Acceptance, E,TB, VU by RM at 11/30/2023 0420    Acceptance, E,TB, VU by MOE at 11/8/2023 1341                                         User Key       Initials Effective Dates Name Provider Type Discipline     06/08/21 -  Bharath Berg RN Registered Nurse Nurse    CR 06/13/22 -  Adilson Baker LPN Licensed Nurse Nurse    MOE 06/03/21 -  Merlin Rao, PT Physical Therapist PT                  PT Recommendation and Plan     Progress Summary (PT)  Progress Toward Functional Goals (PT): progress toward functional goals is fair  Daily Progress Summary (PT): Decreased assistance for ther-ex's today in comparison to treatment I performed with pt. last weekend.  Pt. still requires assistance but overall, assistance level decreased with range of motion in bilateral lower extremeties.   Outcome Measures       Row Name 12/23/23 1600 12/22/23 1017 12/21/23 1225       How much help from another person do you currently need...    Turning from your back to your side while in flat bed without using bedrails? 3  -CS 3  -WM 3  -WM    Moving from lying on back to sitting on the side of a flat bed without bedrails? 2  -CS 2  -WM 2  -WM    Moving to and from a bed to a chair (including a wheelchair)? 1  -CS 1  -WM 1  -WM    Standing up from  a chair using your arms (e.g., wheelchair, bedside chair)? 2  -CS 2  -WM 2  -WM    Climbing 3-5 steps with a railing? 1  -CS 1  -WM 1  -WM    To walk in hospital room? 1  -CS 1  -WM 1  -WM    AM-PAC 6 Clicks Score (PT) 10  -CS 10  -WM 10  -WM    Highest Level of Mobility Goal 4 --> Transfer to chair/commode  -CS 4 --> Transfer to chair/commode  -WM 4 --> Transfer to chair/commode  -WM       Functional Assessment    Outcome Measure Options AM-PAC 6 Clicks Basic Mobility (PT)  -CS -- --              User Key  (r) = Recorded By, (t) = Taken By, (c) = Cosigned By      Initials Name Provider Type     Carson Inman PTA Physical Therapist Assistant     Ronald Fabian PTA Physical Therapist Assistant                     Time Calculation:    PT Charges       Row Name 12/23/23 1603             Time Calculation    Start Time 1326  -CS      PT Received On 12/23/23  -CS         Timed Charges    12084 - PT Therapeutic Exercise Minutes 24  -CS         SNF Physical Therapy Minutes    Skilled Minutes- PT 24 min  -CS         Total Minutes    Timed Charges Total Minutes 24  -CS       Total Minutes 24  -CS                User Key  (r) = Recorded By, (t) = Taken By, (c) = Cosigned By      Initials Name Provider Type     Ronald Fabian PTA Physical Therapist Assistant                  Therapy Charges for Today       Code Description Service Date Service Provider Modifiers Qty    01799495377 HC PT THER PROC EA 15 MIN 12/23/2023 Ronald Fabian PTA GP 2            PT G-Codes  Outcome Measure Options: AM-PAC 6 Clicks Basic Mobility (PT)  AM-PAC 6 Clicks Score (PT): 10  AM-PAC 6 Clicks Score (OT): 14    Ronald Fabian PTA  12/23/2023

## 2023-12-23 NOTE — PLAN OF CARE
Goal Outcome Evaluation:  Plan of Care Reviewed With: patient           Outcome Evaluation: No significant changes this shift. Medicated for pain x 2 and once with the muscle relaxer. Will continue with his plan of care.  Call light and personal items within reach.

## 2023-12-24 LAB
GLUCOSE BLDC GLUCOMTR-MCNC: 106 MG/DL (ref 70–99)
GLUCOSE BLDC GLUCOMTR-MCNC: 139 MG/DL (ref 70–99)
GLUCOSE BLDC GLUCOMTR-MCNC: 169 MG/DL (ref 70–99)
GLUCOSE BLDC GLUCOMTR-MCNC: 188 MG/DL (ref 70–99)

## 2023-12-24 PROCEDURE — 63710000001 INSULIN DETEMIR PER 5 UNITS: Performed by: PHYSICIAN ASSISTANT

## 2023-12-24 PROCEDURE — 82948 REAGENT STRIP/BLOOD GLUCOSE: CPT

## 2023-12-24 PROCEDURE — 63710000001 INSULIN LISPRO (HUMAN) PER 5 UNITS: Performed by: INTERNAL MEDICINE

## 2023-12-24 PROCEDURE — 97110 THERAPEUTIC EXERCISES: CPT

## 2023-12-24 RX ADMIN — Medication 10 MG: at 20:01

## 2023-12-24 RX ADMIN — OXYCODONE AND ACETAMINOPHEN 1 TABLET: 7.5; 325 TABLET ORAL at 21:59

## 2023-12-24 RX ADMIN — PREGABALIN 100 MG: 100 CAPSULE ORAL at 20:01

## 2023-12-24 RX ADMIN — INSULIN DETEMIR 60 UNITS: 100 INJECTION, SOLUTION SUBCUTANEOUS at 20:02

## 2023-12-24 RX ADMIN — INSULIN LISPRO 4 UNITS: 100 INJECTION, SOLUTION INTRAVENOUS; SUBCUTANEOUS at 17:42

## 2023-12-24 RX ADMIN — BACLOFEN 10 MG: 10 TABLET ORAL at 19:25

## 2023-12-24 RX ADMIN — FERROUS SULFATE TAB 325 MG (65 MG ELEMENTAL FE) 325 MG: 325 (65 FE) TAB at 08:39

## 2023-12-24 RX ADMIN — APIXABAN 5 MG: 5 TABLET, FILM COATED ORAL at 20:02

## 2023-12-24 RX ADMIN — PREGABALIN 100 MG: 100 CAPSULE ORAL at 08:39

## 2023-12-24 RX ADMIN — Medication 250 MG: at 20:01

## 2023-12-24 RX ADMIN — Medication 250 MG: at 08:39

## 2023-12-24 RX ADMIN — PREGABALIN 100 MG: 100 CAPSULE ORAL at 16:23

## 2023-12-24 RX ADMIN — APIXABAN 5 MG: 5 TABLET, FILM COATED ORAL at 08:39

## 2023-12-24 RX ADMIN — BACLOFEN 10 MG: 10 TABLET ORAL at 11:02

## 2023-12-24 RX ADMIN — OXYCODONE AND ACETAMINOPHEN 1 TABLET: 7.5; 325 TABLET ORAL at 02:02

## 2023-12-24 RX ADMIN — LISINOPRIL 2.5 MG: 2.5 TABLET ORAL at 08:39

## 2023-12-24 RX ADMIN — OXYCODONE AND ACETAMINOPHEN 1 TABLET: 7.5; 325 TABLET ORAL at 08:42

## 2023-12-24 RX ADMIN — OXYCODONE AND ACETAMINOPHEN 1 TABLET: 7.5; 325 TABLET ORAL at 15:07

## 2023-12-24 RX ADMIN — ATORVASTATIN CALCIUM 10 MG: 10 TABLET, FILM COATED ORAL at 20:01

## 2023-12-24 RX ADMIN — CYANOCOBALAMIN TAB 500 MCG 1000 MCG: 500 TAB at 08:39

## 2023-12-24 RX ADMIN — INSULIN DETEMIR 70 UNITS: 100 INJECTION, SOLUTION SUBCUTANEOUS at 08:39

## 2023-12-24 RX ADMIN — EMPAGLIFLOZIN 10 MG: 10 TABLET, FILM COATED ORAL at 08:39

## 2023-12-24 RX ADMIN — SERTRALINE HYDROCHLORIDE 25 MG: 25 TABLET ORAL at 20:02

## 2023-12-24 RX ADMIN — INSULIN LISPRO 4 UNITS: 100 INJECTION, SOLUTION INTRAVENOUS; SUBCUTANEOUS at 20:02

## 2023-12-24 NOTE — PLAN OF CARE
Goal Outcome Evaluation:   Alert and oriented and pleasant with staff. X2 max assist for transfers and ambulation. X2 admin PRN pain medication with relief of symptoms noted. New order for Kpad received this shift. More Activity and ambulation encouraged. Needs reinforcement. Sitting up in bed, call light in reach.

## 2023-12-24 NOTE — THERAPY TREATMENT NOTE
SNF - Physical Therapy Progress Note  [facility]     Patient Name: Jose Shaikh  : 1960  MRN: [id number]  Today's Date: 2023      Visit Dx:    ICD-10-CM ICD-9-CM   1. Difficulty in walking  R26.2 719.7     Patient Active Problem List   Diagnosis    Diabetic ulcer of left heel associated with type 2 DM    Acute osteomyelitis of left calcaneus     Diabetic ulcer of left heel associated with type 2 DM    Diabetic ulcer of right midfoot associated with type 2 DM    Paroxysmal atrial fibrillation    Essential hypertension    Hyperlipidemia LDL goal <100    Cellulitis and abscess of foot    High alkaline phosphatase level    Osteomyelitis    Onychomycosis    Onychocryptosis    Foot pain, bilateral    Osteomyelitis of foot, right, acute    Cellulitis of right foot    Type 2 diabetes mellitus, with long-term current use of insulin    Class 3 severe obesity due to excess calories with serious comorbidity and body mass index (BMI) of 45.0 to 49.9 in adult    Anxiety disorder, unspecified    Claustrophobia    Dependence on wheelchair    Depression, unspecified    Long term (current) use of anticoagulants    Long term (current) use of oral hypoglycemic drugs    Wound of foot    Non-prs chronic ulcer oth prt r foot limited to brkdwn skin    Orthostatic hypotension    Other chronic osteomyelitis, right ankle and foot    Personal history of nicotine dependence    Thrombocytopenia, unspecified    Unspecified open wound, right foot, initial encounter    Diabetic foot infection    Subacute osteomyelitis of right foot    Right foot pain    Sepsis    Onychomycosis    Foot pain, left    Impaired mobility and ADLs    Absence of toe of right foot    Corns and callosity    Disability of walking    Fracture    Limb swelling    Polyneuropathy    Pressure ulcer, stage 1    Shortness of breath    Generalized weakness    Debility     Past Medical History:   Diagnosis Date    Absence of toe of right foot     Acute osteomyelitis of  left calcaneus  08/18/2021    Anxiety and depression     Arthritis     Cancer     Claustrophobia     Corns and callus     Diabetic ulcer of left heel associated with type 2 DM 08/18/2021    Diabetic ulcer of left heel associated with type 2 DM 07/06/2021    Diabetic ulcer of right midfoot associated with type 2 DM 08/18/2021    Difficulty walking     Essential hypertension 08/31/2021    Hammertoe     Hyperlipidemia LDL goal <100 08/31/2021    Ingrown toenail     Obesity     Paroxysmal atrial fibrillation 08/31/2021    Polyneuropathy     Pressure ulcer, stage 1     Tinea unguium     Type 2 diabetes mellitus with polyneuropathy      Past Surgical History:   Procedure Laterality Date    CYST REMOVAL      center of back; benign    EYE SURGERY      INCISION AND DRAINAGE ABSCESS      back    INCISION AND DRAINAGE LEG Right 12/10/2021    Procedure: INCISION AND DRAINAGE LOWER EXTREMITY;  Surgeon: Ash Leyva DPM;  Location: [facility];  Service: Podiatry;  Laterality: Right;    OTHER SURGICAL HISTORY      Surgical clips left foot    TOE SURGERY Right     Removal of 5th toe    TRANS METATARSAL AMPUTATION Right 12/02/2021    Procedure: AMPUTATION TRANS METATARSAL;  Surgeon: Ash Leyva DPM;  Location: [facility];  Service: Podiatry;  Laterality: Right;    VASCULAR SURGERY      WRIST SURGERY Left     repair of injury       PT Assessment (last 12 hours)       PT Evaluation and Treatment       Row Name 12/24/23 1200          Physical Therapy Time and Intention    Subjective Information complains of;pain  -CS     Document Type therapy note (daily note)  -CS     Mode of Treatment individual therapy;physical therapy  -CS     Patient Effort good  -CS     Symptoms Noted During/After Treatment none  -CS       Row Name 12/24/23 1200          Pain    Pretreatment Pain Rating 10/10  -CS     Posttreatment Pain Rating 10/10  -CS     Pain Location - Side/Orientation Left  -CS     Pain Location - hip;knee   -CS       Row Name 12/24/23 1200          Hip (Therapeutic Exercise)    Hip AROM (Therapeutic Exercise) right;external rotation;internal rotation;supine;30 repititions  -CS     Hip AAROM (Therapeutic Exercise) left;bilateral;aBduction;aDduction;external rotation;internal rotation;supine;10 repetitions;3 sets  Left ER/IR, bilateral abd/add  -CS     Hip Isometrics (Therapeutic Exercise) bilateral;gluteal sets;supine;10 repetitions;3 sets  -CS       Row Name 12/24/23 1200          Knee (Therapeutic Exercise)    Knee AAROM (Therapeutic Exercise) bilateral;supine;10 repetitions;3 sets  SLR's  -CS     Knee Isometrics (Therapeutic Exercise) bilateral;quad sets;supine;10 repetitions;3 sets  -     Knee Strengthening (Therapeutic Exercise) bilateral;SAQ (short arc quad);supine;5 lb free weight;30 repititions  -       Row Name 12/24/23 1200          Ankle (Therapeutic Exercise)    Ankle AROM (Therapeutic Exercise) bilateral;dorsiflexion;plantarflexion;supine;30 repititions  -       Row Name             Wound 11/07/23 1240 Right anterior foot    Wound - Properties Group Placement Date: 11/07/23  -FH Placement Time: 1240  -FH Side: Right  -FH Orientation: anterior  -FH Location: foot  -FH    Retired Wound - Properties Group Placement Date: 11/07/23  -FH Placement Time: 1240  -FH Side: Right  -FH Orientation: anterior  -FH Location: foot  -FH    Retired Wound - Properties Group Date first assessed: 11/07/23  -FH Time first assessed: 1240  -FH Side: Right  -FH Location: foot  -FH      Row Name 12/24/23 1200          Positioning and Restraints    Pre-Treatment Position in bed  -CS     Post Treatment Position bed  -CS     In Bed fowlers;call light within reach;encouraged to call for assist  -       Row Name 12/24/23 1200          Progress Summary (PT)    Progress Toward Functional Goals (PT) progress toward functional goals is fair  -CS               User Key  (r) = Recorded By, (t) = Taken By, (c) = Cosigned By       Initials Name Provider Type     Carlos Cagle, RN Registered Nurse    Ronald Huerta PTA Physical Therapist Assistant                  Section G              Section GG                       Physical Therapy Education       Title: PT OT SLP Therapies (Done)       Topic: Physical Therapy (Done)       Point: Mobility training (Done)       Learning Progress Summary             Patient Acceptance, E, VU by CR at 12/20/2023 1345    Acceptance, E, VU by CR at 12/19/2023 1004    Acceptance, E,TB, VU by RM at 11/30/2023 0420    Acceptance, E,TB, VU by MOE at 11/8/2023 1341                         Point: Precautions (Done)       Learning Progress Summary             Patient Acceptance, E,TB, VU by RM at 11/30/2023 0420    Acceptance, E,TB, VU by MOE at 11/8/2023 1341                                         User Key       Initials Effective Dates Name Provider Type Discipline     06/08/21 -  Bharath Berg RN Registered Nurse Nurse    CR 06/13/22 -  Adilson Baker LPN Licensed Nurse Nurse    MOE 06/03/21 -  Merlin Rao PT Physical Therapist PT                  PT Recommendation and Plan     Progress Summary (PT)  Progress Toward Functional Goals (PT): progress toward functional goals is fair  Daily Progress Summary (PT): Decreased assistance for ther-ex's today in comparison to treatment I performed with pt. last weekend.  Pt. still requires assistance but overall, assistance level decreased with range of motion in bilateral lower extremeties.   Outcome Measures       Row Name 12/24/23 1200 12/23/23 1600 12/22/23 1017       How much help from another person do you currently need...    Turning from your back to your side while in flat bed without using bedrails? 3  -CS 3  -CS 3  -WM    Moving from lying on back to sitting on the side of a flat bed without bedrails? 2  -CS 2  -CS 2  -WM    Moving to and from a bed to a chair (including a wheelchair)? 1  -CS 1  -CS 1  -WM    Standing up from a chair using  your arms (e.g., wheelchair, bedside chair)? 2  -CS 2  -CS 2  -WM    Climbing 3-5 steps with a railing? 1  -CS 1  -CS 1  -WM    To walk in hospital room? 1  -CS 1  -CS 1  -WM    AM-PAC 6 Clicks Score (PT) 10  -CS 10  -CS 10  -WM    Highest Level of Mobility Goal 4 --> Transfer to chair/commode  -CS 4 --> Transfer to chair/commode  -CS 4 --> Transfer to chair/commode  -WM       Functional Assessment    Outcome Measure Options AM-PAC 6 Clicks Basic Mobility (PT)  -CS AM-PAC 6 Clicks Basic Mobility (PT)  -CS --      Row Name 12/21/23 1225             How much help from another person do you currently need...    Turning from your back to your side while in flat bed without using bedrails? 3  -WM      Moving from lying on back to sitting on the side of a flat bed without bedrails? 2  -WM      Moving to and from a bed to a chair (including a wheelchair)? 1  -WM      Standing up from a chair using your arms (e.g., wheelchair, bedside chair)? 2  -WM      Climbing 3-5 steps with a railing? 1  -WM      To walk in hospital room? 1  -WM      AM-PAC 6 Clicks Score (PT) 10  -WM      Highest Level of Mobility Goal 4 --> Transfer to chair/commode  -WM                User Key  (r) = Recorded By, (t) = Taken By, (c) = Cosigned By      Initials Name Provider Type     Carson Inman PTA Physical Therapist Assistant    Ronald Huerta PTA Physical Therapist Assistant                     Time Calculation:    PT Charges       Row Name 12/24/23 1211             Time Calculation    Start Time 1005  -CS      PT Received On 12/24/23  -CS         Timed Charges    86232 - PT Therapeutic Exercise Minutes 24  -CS         SNF Physical Therapy Minutes    Skilled Minutes- PT 24 min  -CS         Total Minutes    Timed Charges Total Minutes 24  -CS       Total Minutes 24  -CS                User Key  (r) = Recorded By, (t) = Taken By, (c) = Cosigned By      Initials Name Provider Type    Ronald Huerta PTA Physical Therapist Assistant                   Therapy Charges for Today       Code Description Service Date Service Provider Modifiers Qty    53434431469 HC PT THER PROC EA 15 MIN 12/23/2023 Ronald Fabian PTA GP 2    83488310516 HC PT THER PROC EA 15 MIN 12/24/2023 Ronald Fabian PTA GP 2            PT G-Codes  Outcome Measure Options: AM-PAC 6 Clicks Basic Mobility (PT)  AM-PAC 6 Clicks Score (PT): 10  AM-PAC 6 Clicks Score (OT): 14    Ronald Fabian PTA  12/24/2023

## 2023-12-24 NOTE — PLAN OF CARE
Goal Outcome Evaluation:           Progress: no change   Rsd. Continues to refuse turns every 2 hours, but will pull self up in bed with trapeze bar.

## 2023-12-25 LAB
GLUCOSE BLDC GLUCOMTR-MCNC: 106 MG/DL (ref 70–99)
GLUCOSE BLDC GLUCOMTR-MCNC: 175 MG/DL (ref 70–99)
GLUCOSE BLDC GLUCOMTR-MCNC: 199 MG/DL (ref 70–99)
GLUCOSE BLDC GLUCOMTR-MCNC: 226 MG/DL (ref 70–99)
SARS-COV-2 RNA RESP QL NAA+PROBE: NOT DETECTED

## 2023-12-25 PROCEDURE — 63710000001 INSULIN DETEMIR PER 5 UNITS: Performed by: PHYSICIAN ASSISTANT

## 2023-12-25 PROCEDURE — 63710000001 INSULIN LISPRO (HUMAN) PER 5 UNITS: Performed by: INTERNAL MEDICINE

## 2023-12-25 PROCEDURE — 87635 SARS-COV-2 COVID-19 AMP PRB: CPT | Performed by: FAMILY MEDICINE

## 2023-12-25 PROCEDURE — 82948 REAGENT STRIP/BLOOD GLUCOSE: CPT

## 2023-12-25 RX ADMIN — IBUPROFEN 400 MG: 400 TABLET ORAL at 10:09

## 2023-12-25 RX ADMIN — INSULIN DETEMIR 70 UNITS: 100 INJECTION, SOLUTION SUBCUTANEOUS at 08:04

## 2023-12-25 RX ADMIN — IBUPROFEN 400 MG: 400 TABLET ORAL at 02:58

## 2023-12-25 RX ADMIN — SERTRALINE HYDROCHLORIDE 25 MG: 25 TABLET ORAL at 20:29

## 2023-12-25 RX ADMIN — APIXABAN 5 MG: 5 TABLET, FILM COATED ORAL at 08:05

## 2023-12-25 RX ADMIN — OXYCODONE AND ACETAMINOPHEN 1 TABLET: 7.5; 325 TABLET ORAL at 06:07

## 2023-12-25 RX ADMIN — ATORVASTATIN CALCIUM 10 MG: 10 TABLET, FILM COATED ORAL at 20:31

## 2023-12-25 RX ADMIN — IBUPROFEN 400 MG: 400 TABLET ORAL at 20:30

## 2023-12-25 RX ADMIN — Medication 250 MG: at 08:06

## 2023-12-25 RX ADMIN — INSULIN LISPRO 4 UNITS: 100 INJECTION, SOLUTION INTRAVENOUS; SUBCUTANEOUS at 17:34

## 2023-12-25 RX ADMIN — CYANOCOBALAMIN TAB 500 MCG 1000 MCG: 500 TAB at 08:05

## 2023-12-25 RX ADMIN — LISINOPRIL 2.5 MG: 2.5 TABLET ORAL at 08:05

## 2023-12-25 RX ADMIN — INSULIN LISPRO 4 UNITS: 100 INJECTION, SOLUTION INTRAVENOUS; SUBCUTANEOUS at 12:15

## 2023-12-25 RX ADMIN — INSULIN LISPRO 8 UNITS: 100 INJECTION, SOLUTION INTRAVENOUS; SUBCUTANEOUS at 20:32

## 2023-12-25 RX ADMIN — EMPAGLIFLOZIN 10 MG: 10 TABLET, FILM COATED ORAL at 08:06

## 2023-12-25 RX ADMIN — OXYCODONE AND ACETAMINOPHEN 1 TABLET: 7.5; 325 TABLET ORAL at 13:39

## 2023-12-25 RX ADMIN — BACLOFEN 10 MG: 10 TABLET ORAL at 08:05

## 2023-12-25 RX ADMIN — FERROUS SULFATE TAB 325 MG (65 MG ELEMENTAL FE) 325 MG: 325 (65 FE) TAB at 08:05

## 2023-12-25 RX ADMIN — Medication 250 MG: at 20:29

## 2023-12-25 RX ADMIN — PREGABALIN 100 MG: 100 CAPSULE ORAL at 08:05

## 2023-12-25 RX ADMIN — PREGABALIN 100 MG: 100 CAPSULE ORAL at 20:30

## 2023-12-25 RX ADMIN — BACLOFEN 10 MG: 10 TABLET ORAL at 17:34

## 2023-12-25 RX ADMIN — Medication 10 MG: at 20:30

## 2023-12-25 RX ADMIN — INSULIN DETEMIR 60 UNITS: 100 INJECTION, SOLUTION SUBCUTANEOUS at 20:32

## 2023-12-25 RX ADMIN — OXYCODONE AND ACETAMINOPHEN 1 TABLET: 7.5; 325 TABLET ORAL at 21:53

## 2023-12-25 RX ADMIN — APIXABAN 5 MG: 5 TABLET, FILM COATED ORAL at 20:31

## 2023-12-25 RX ADMIN — PREGABALIN 100 MG: 100 CAPSULE ORAL at 16:21

## 2023-12-25 NOTE — PLAN OF CARE
Goal Outcome Evaluation:  Plan of Care Reviewed With: patient        Progress: improving  Outcome Evaluation: Resident alert, oriented, able to make needs known to staff.  Remained in bed this shift, only transfers with therapy. Medicated for prn pain x1, effective, medicated with prn baclofen x2 per request for muscle spasms, medicated with prn motrin x1 since it was too early for baclofen or percocet. blood glucose elevated for lunch and dinner, covered with sliding scale. treatment done to rt foot as ordered. only scabs remain to amputation site, tolerated well. Resident pleasant and cooperative.

## 2023-12-25 NOTE — PLAN OF CARE
Goal Outcome Evaluation:  Plan of Care Reviewed With: patient        Progress: no change  Outcome Evaluation: Alert and oriented x4; able to make needs known to staff. Baclofen, Motrin,  and Percocet administered as needed per MAR for complaints of left hip pain. Resident has utilized urinal this shift but is a 1 assist to transfer to BSC. Warm reddened area to left upper arm; borders marked. Will notify oncoming nurse in report this am. Has utilized bedpan and urinal tonight. Call light within reach; care plan ongoing.    Correction from above: Resident is a heavy 2 assist out of bed with therapy on dayshift; has not been OOB tonight.

## 2023-12-26 LAB
GLUCOSE BLDC GLUCOMTR-MCNC: 105 MG/DL (ref 70–99)
GLUCOSE BLDC GLUCOMTR-MCNC: 154 MG/DL (ref 70–99)
GLUCOSE BLDC GLUCOMTR-MCNC: 158 MG/DL (ref 70–99)
GLUCOSE BLDC GLUCOMTR-MCNC: 95 MG/DL (ref 70–99)

## 2023-12-26 PROCEDURE — 63710000001 INSULIN DETEMIR PER 5 UNITS: Performed by: PHYSICIAN ASSISTANT

## 2023-12-26 PROCEDURE — 82948 REAGENT STRIP/BLOOD GLUCOSE: CPT

## 2023-12-26 PROCEDURE — 63710000001 INSULIN LISPRO (HUMAN) PER 5 UNITS: Performed by: INTERNAL MEDICINE

## 2023-12-26 PROCEDURE — 97110 THERAPEUTIC EXERCISES: CPT

## 2023-12-26 RX ADMIN — PREGABALIN 100 MG: 100 CAPSULE ORAL at 08:29

## 2023-12-26 RX ADMIN — APIXABAN 5 MG: 5 TABLET, FILM COATED ORAL at 08:29

## 2023-12-26 RX ADMIN — APIXABAN 5 MG: 5 TABLET, FILM COATED ORAL at 20:17

## 2023-12-26 RX ADMIN — INSULIN DETEMIR 60 UNITS: 100 INJECTION, SOLUTION SUBCUTANEOUS at 20:18

## 2023-12-26 RX ADMIN — Medication 10 MG: at 20:17

## 2023-12-26 RX ADMIN — OXYCODONE AND ACETAMINOPHEN 1 TABLET: 7.5; 325 TABLET ORAL at 04:34

## 2023-12-26 RX ADMIN — EMPAGLIFLOZIN 10 MG: 10 TABLET, FILM COATED ORAL at 08:29

## 2023-12-26 RX ADMIN — BACLOFEN 10 MG: 10 TABLET ORAL at 23:11

## 2023-12-26 RX ADMIN — LISINOPRIL 2.5 MG: 2.5 TABLET ORAL at 08:29

## 2023-12-26 RX ADMIN — SERTRALINE HYDROCHLORIDE 25 MG: 25 TABLET ORAL at 20:17

## 2023-12-26 RX ADMIN — BACLOFEN 10 MG: 10 TABLET ORAL at 05:37

## 2023-12-26 RX ADMIN — INSULIN DETEMIR 70 UNITS: 100 INJECTION, SOLUTION SUBCUTANEOUS at 08:29

## 2023-12-26 RX ADMIN — ATORVASTATIN CALCIUM 10 MG: 10 TABLET, FILM COATED ORAL at 20:17

## 2023-12-26 RX ADMIN — BACLOFEN 10 MG: 10 TABLET ORAL at 13:38

## 2023-12-26 RX ADMIN — OXYCODONE AND ACETAMINOPHEN 1 TABLET: 7.5; 325 TABLET ORAL at 10:57

## 2023-12-26 RX ADMIN — Medication 250 MG: at 20:17

## 2023-12-26 RX ADMIN — MICONAZOLE NITRATE: 2 POWDER TOPICAL at 20:20

## 2023-12-26 RX ADMIN — PREGABALIN 100 MG: 100 CAPSULE ORAL at 16:42

## 2023-12-26 RX ADMIN — CYANOCOBALAMIN TAB 500 MCG 1000 MCG: 500 TAB at 08:29

## 2023-12-26 RX ADMIN — OXYCODONE AND ACETAMINOPHEN 1 TABLET: 7.5; 325 TABLET ORAL at 19:46

## 2023-12-26 RX ADMIN — INSULIN LISPRO 4 UNITS: 100 INJECTION, SOLUTION INTRAVENOUS; SUBCUTANEOUS at 20:17

## 2023-12-26 RX ADMIN — FERROUS SULFATE TAB 325 MG (65 MG ELEMENTAL FE) 325 MG: 325 (65 FE) TAB at 08:29

## 2023-12-26 RX ADMIN — Medication 250 MG: at 08:29

## 2023-12-26 RX ADMIN — PREGABALIN 100 MG: 100 CAPSULE ORAL at 20:17

## 2023-12-26 RX ADMIN — INSULIN LISPRO 4 UNITS: 100 INJECTION, SOLUTION INTRAVENOUS; SUBCUTANEOUS at 12:11

## 2023-12-26 NOTE — THERAPY TREATMENT NOTE
Acute Care - Physical Therapy Treatment Note  KEO Dominguez     Patient Name: Jose Shaikh  : 1960  MRN: [id number]  Today's Date: 2023      Visit Dx:     ICD-10-CM ICD-9-CM   1. Difficulty in walking  R26.2 719.7     Patient Active Problem List   Diagnosis    Diabetic ulcer of left heel associated with type 2 DM    Acute osteomyelitis of left calcaneus     Diabetic ulcer of left heel associated with type 2 DM    Diabetic ulcer of right midfoot associated with type 2 DM    Paroxysmal atrial fibrillation    Essential hypertension    Hyperlipidemia LDL goal <100    Cellulitis and abscess of foot    High alkaline phosphatase level    Osteomyelitis    Onychomycosis    Onychocryptosis    Foot pain, bilateral    Osteomyelitis of foot, right, acute    Cellulitis of right foot    Type 2 diabetes mellitus, with long-term current use of insulin    Class 3 severe obesity due to excess calories with serious comorbidity and body mass index (BMI) of 45.0 to 49.9 in adult    Anxiety disorder, unspecified    Claustrophobia    Dependence on wheelchair    Depression, unspecified    Long term (current) use of anticoagulants    Long term (current) use of oral hypoglycemic drugs    Wound of foot    Non-prs chronic ulcer oth prt r foot limited to brkdwn skin    Orthostatic hypotension    Other chronic osteomyelitis, right ankle and foot    Personal history of nicotine dependence    Thrombocytopenia, unspecified    Unspecified open wound, right foot, initial encounter    Diabetic foot infection    Subacute osteomyelitis of right foot    Right foot pain    Sepsis    Onychomycosis    Foot pain, left    Impaired mobility and ADLs    Absence of toe of right foot    Corns and callosity    Disability of walking    Fracture    Limb swelling    Polyneuropathy    Pressure ulcer, stage 1    Shortness of breath    Generalized weakness    Debility     Past Medical History:   Diagnosis Date    Absence of toe of right foot     Acute  osteomyelitis of left calcaneus  08/18/2021    Anxiety and depression     Arthritis     Cancer     Claustrophobia     Corns and callus     Diabetic ulcer of left heel associated with type 2 DM 08/18/2021    Diabetic ulcer of left heel associated with type 2 DM 07/06/2021    Diabetic ulcer of right midfoot associated with type 2 DM 08/18/2021    Difficulty walking     Essential hypertension 08/31/2021    Hammertoe     Hyperlipidemia LDL goal <100 08/31/2021    Ingrown toenail     Obesity     Paroxysmal atrial fibrillation 08/31/2021    Polyneuropathy     Pressure ulcer, stage 1     Tinea unguium     Type 2 diabetes mellitus with polyneuropathy      Past Surgical History:   Procedure Laterality Date    CYST REMOVAL      center of back; benign    EYE SURGERY      INCISION AND DRAINAGE ABSCESS      back    INCISION AND DRAINAGE LEG Right 12/10/2021    Procedure: INCISION AND DRAINAGE LOWER EXTREMITY;  Surgeon: Ash Leyva DPM;  Location: [facility];  Service: Podiatry;  Laterality: Right;    OTHER SURGICAL HISTORY      Surgical clips left foot    TOE SURGERY Right     Removal of 5th toe    TRANS METATARSAL AMPUTATION Right 12/02/2021    Procedure: AMPUTATION TRANS METATARSAL;  Surgeon: Ash Leyva DPM;  Location: [facility];  Service: Podiatry;  Laterality: Right;    VASCULAR SURGERY      WRIST SURGERY Left     repair of injury     PT Assessment (last 12 hours)       PT Evaluation and Treatment       Row Name 12/26/23 7364          Physical Therapy Time and Intention    Subjective Information complains of;weakness;pain  -RH     Document Type therapy note (daily note)  -     Mode of Treatment physical therapy;individual therapy  -     Comment Pt today agreeable to therex in supine but decllines transfers.  -       Row Name 12/26/23 1518          Pain Scale: FACES Pre/Post-Treatment    Pain: FACES Scale, Pretreatment 2-->hurts little bit  -     Posttreatment Pain Rating 2-->hurts  little bit  -     Pain Location - Side/Orientation Left  -     Pain Location - hip  -     Pre/Posttreatment Pain Comment Pt reports recently having had pain meds.  -       Row Name 12/26/23 1346          Motor Skills    Therapeutic Exercise hip;knee;ankle  -       Row Name 12/26/23 1346          Hip (Therapeutic Exercise)    Hip (Therapeutic Exercise) isometric exercises;AAROM (active assistive range of motion)  -     Hip AAROM (Therapeutic Exercise) bilateral;aBduction;aDduction;flexion;extension;supine;10 repetitions;2 sets  -     Hip Isometrics (Therapeutic Exercise) gluteal sets;bilateral;supine;10 repetitions;2 sets  -       Row Name 12/26/23 1346          Knee (Therapeutic Exercise)    Knee (Therapeutic Exercise) AROM (active range of motion);isometric exercises;strengthening exercise  -     Knee AROM (Therapeutic Exercise) SAQ (short arc quad);bilateral;supine;10 repetitions;2 sets  -     Knee Isometrics (Therapeutic Exercise) quad sets;bilateral;supine;10 repetitions;2 sets  -     Knee Strengthening (Therapeutic Exercise) SLR (straight leg raise);bilateral;supine;10 repetitions;2 sets  -       Row Name 12/26/23 1346          Ankle (Therapeutic Exercise)    Ankle (Therapeutic Exercise) AROM (active range of motion)  -     Ankle AROM (Therapeutic Exercise) plantarflexion;dorsiflexion;bilateral;supine;10 repetitions;2 sets  -       Row Name             Wound 11/07/23 1240 Right anterior foot    Wound - Properties Group Placement Date: 11/07/23  - Placement Time: 1240  -FH Side: Right  -FH Orientation: anterior  -FH Location: foot  -FH    Retired Wound - Properties Group Placement Date: 11/07/23  - Placement Time: 1240  -FH Side: Right  -FH Orientation: anterior  -FH Location: foot  -FH    Retired Wound - Properties Group Date first assessed: 11/07/23  - Time first assessed: 1240  -FH Side: Right  -FH Location: foot  -      Row Name 12/26/23 1346          Vital Signs    O2  Delivery Intra Treatment room air  -RH       Row Name 12/26/23 1346          Progress Summary (PT)    Progress Toward Functional Goals (PT) progress toward functional goals is fair  -RH               User Key  (r) = Recorded By, (t) = Taken By, (c) = Cosigned By      Initials Name Provider Type     Carlos Cagle, RN Registered Nurse    RH Bijan Todd PTA Physical Therapist Assistant                    Physical Therapy Education       Title: PT OT SLP Therapies (Done)       Topic: Physical Therapy (Done)       Point: Mobility training (Done)       Learning Progress Summary             Patient Acceptance, E, VU by CR at 12/20/2023 1345    Acceptance, E, VU by CR at 12/19/2023 1004    Acceptance, E,TB, VU by RM at 11/30/2023 0420    Acceptance, E,TB, VU by MOE at 11/8/2023 1341                         Point: Precautions (Done)       Learning Progress Summary             Patient Acceptance, E,TB, VU by RM at 11/30/2023 0420    Acceptance, E,TB, VU by MOE at 11/8/2023 1341                                         User Key       Initials Effective Dates Name Provider Type Discipline     06/08/21 -  Bharath Berg, RN Registered Nurse Nurse    CR 06/13/22 -  Adilson Baker LPN Licensed Nurse Nurse    MOE 06/03/21 -  Mrelin Rao, PT Physical Therapist PT                  PT Recommendation and Plan     Progress Summary (PT)  Progress Toward Functional Goals (PT): progress toward functional goals is fair   Outcome Measures       Row Name 12/26/23 1300 12/24/23 1200 12/23/23 1600       How much help from another person do you currently need...    Turning from your back to your side while in flat bed without using bedrails? 3  -RH 3  -CS 3  -CS    Moving from lying on back to sitting on the side of a flat bed without bedrails? 2  -RH 2  -CS 2  -CS    Moving to and from a bed to a chair (including a wheelchair)? 1  -RH 1  -CS 1  -CS    Standing up from a chair using your arms (e.g., wheelchair, bedside chair)? 2   -RH 2  -CS 2  -CS    Climbing 3-5 steps with a railing? 1  -RH 1  -CS 1  -CS    To walk in hospital room? 2  -RH 1  -CS 1  -CS    AM-PAC 6 Clicks Score (PT) 11  -RH 10  -CS 10  -CS    Highest Level of Mobility Goal 4 --> Transfer to chair/commode  -RH 4 --> Transfer to chair/commode  -CS 4 --> Transfer to chair/commode  -CS       Functional Assessment    Outcome Measure Options -- AM-PAC 6 Clicks Basic Mobility (PT)  -CS AM-PAC 6 Clicks Basic Mobility (PT)  -CS              User Key  (r) = Recorded By, (t) = Taken By, (c) = Cosigned By      Initials Name Provider Type     Bijan Todd PTA Physical Therapist Assistant     Ronald Fabian PTA Physical Therapist Assistant                     Time Calculation:    PT Charges       Row Name 12/26/23 1346             Time Calculation    PT Received On 12/26/23  -RH         Timed Charges    73658 - PT Therapeutic Exercise Minutes 24  -RH         Total Minutes    Timed Charges Total Minutes 24  -RH       Total Minutes 24  -RH                User Key  (r) = Recorded By, (t) = Taken By, (c) = Cosigned By      Initials Name Provider Type     Bijan Todd PTA Physical Therapist Assistant                  Therapy Charges for Today       Code Description Service Date Service Provider Modifiers Qty    11949218124 HC PT THER PROC EA 15 MIN 12/26/2023 Bijan Todd PTA GP 2            PT G-Codes  Outcome Measure Options: AM-PAC 6 Clicks Basic Mobility (PT)  AM-PAC 6 Clicks Score (PT): 11  AM-PAC 6 Clicks Score (OT): 14    Bijan Todd PTA  12/26/2023

## 2023-12-26 NOTE — PLAN OF CARE
Goal Outcome Evaluation:  Plan of Care Reviewed With: patient        Progress: improving  Outcome Evaluation: resident alert, oriented, able to make needs known to staff. remained in bed this shift. stands/transfers with therapy only. Medicated for prn pain x1, medicated with prn baclofen x1 per request, effective. MD in for follow up visit, looked at arms and reddened whelp, stated give insulin to abdomen only. resident bowel incontinence increased. increased redness noted to skin around anus, excoriation, fungal rash noted to gtoin. New order for Miconazole powder. Blood glucose elevated for lunch, covered with sliding scale. Resident pleasant and cooperative.

## 2023-12-27 LAB
GLUCOSE BLDC GLUCOMTR-MCNC: 114 MG/DL (ref 70–99)
GLUCOSE BLDC GLUCOMTR-MCNC: 156 MG/DL (ref 70–99)
GLUCOSE BLDC GLUCOMTR-MCNC: 169 MG/DL (ref 70–99)
GLUCOSE BLDC GLUCOMTR-MCNC: 73 MG/DL (ref 70–99)

## 2023-12-27 PROCEDURE — 97530 THERAPEUTIC ACTIVITIES: CPT

## 2023-12-27 PROCEDURE — 63710000001 INSULIN DETEMIR PER 5 UNITS: Performed by: PHYSICIAN ASSISTANT

## 2023-12-27 PROCEDURE — 82948 REAGENT STRIP/BLOOD GLUCOSE: CPT

## 2023-12-27 PROCEDURE — 63710000001 INSULIN LISPRO (HUMAN) PER 5 UNITS: Performed by: INTERNAL MEDICINE

## 2023-12-27 PROCEDURE — 97164 PT RE-EVAL EST PLAN CARE: CPT

## 2023-12-27 RX ADMIN — Medication 10 MG: at 20:59

## 2023-12-27 RX ADMIN — OXYCODONE AND ACETAMINOPHEN 1 TABLET: 7.5; 325 TABLET ORAL at 19:27

## 2023-12-27 RX ADMIN — PREGABALIN 100 MG: 100 CAPSULE ORAL at 16:18

## 2023-12-27 RX ADMIN — ATORVASTATIN CALCIUM 10 MG: 10 TABLET, FILM COATED ORAL at 20:59

## 2023-12-27 RX ADMIN — Medication 250 MG: at 20:59

## 2023-12-27 RX ADMIN — OXYCODONE AND ACETAMINOPHEN 1 TABLET: 7.5; 325 TABLET ORAL at 03:21

## 2023-12-27 RX ADMIN — SERTRALINE HYDROCHLORIDE 25 MG: 25 TABLET ORAL at 20:59

## 2023-12-27 RX ADMIN — BACLOFEN 10 MG: 10 TABLET ORAL at 07:27

## 2023-12-27 RX ADMIN — INSULIN DETEMIR 70 UNITS: 100 INJECTION, SOLUTION SUBCUTANEOUS at 09:35

## 2023-12-27 RX ADMIN — BACLOFEN 10 MG: 10 TABLET ORAL at 23:36

## 2023-12-27 RX ADMIN — PREGABALIN 100 MG: 100 CAPSULE ORAL at 20:59

## 2023-12-27 RX ADMIN — INSULIN DETEMIR 60 UNITS: 100 INJECTION, SOLUTION SUBCUTANEOUS at 21:00

## 2023-12-27 RX ADMIN — OXYCODONE AND ACETAMINOPHEN 1 TABLET: 7.5; 325 TABLET ORAL at 11:34

## 2023-12-27 RX ADMIN — MICONAZOLE NITRATE 1 APPLICATION: 2 POWDER TOPICAL at 09:37

## 2023-12-27 RX ADMIN — PREGABALIN 100 MG: 100 CAPSULE ORAL at 09:36

## 2023-12-27 RX ADMIN — EMPAGLIFLOZIN 10 MG: 10 TABLET, FILM COATED ORAL at 09:36

## 2023-12-27 RX ADMIN — Medication 250 MG: at 09:36

## 2023-12-27 RX ADMIN — CYANOCOBALAMIN TAB 500 MCG 1000 MCG: 500 TAB at 09:36

## 2023-12-27 RX ADMIN — APIXABAN 5 MG: 5 TABLET, FILM COATED ORAL at 20:59

## 2023-12-27 RX ADMIN — INSULIN LISPRO 4 UNITS: 100 INJECTION, SOLUTION INTRAVENOUS; SUBCUTANEOUS at 21:00

## 2023-12-27 RX ADMIN — APIXABAN 5 MG: 5 TABLET, FILM COATED ORAL at 09:36

## 2023-12-27 RX ADMIN — LISINOPRIL 2.5 MG: 2.5 TABLET ORAL at 09:36

## 2023-12-27 RX ADMIN — FERROUS SULFATE TAB 325 MG (65 MG ELEMENTAL FE) 325 MG: 325 (65 FE) TAB at 09:36

## 2023-12-27 RX ADMIN — MICONAZOLE NITRATE: 2 POWDER TOPICAL at 21:01

## 2023-12-27 NOTE — PLAN OF CARE
Goal Outcome Evaluation:  Plan of Care Reviewed With: patient        Progress: no change  Outcome Evaluation: Alert and oriented x4; able to make needs known. Continues to refuse turns stating it hurts his hips too bad. Baclofen administered x1 and Percocet x2 for left hip pain. Incontinent of stool most of shift. Skin red and raw to inner buttock; using protective cream frequently. Call light within reach; care plan ongoing.

## 2023-12-27 NOTE — PLAN OF CARE
"Goal Outcome Evaluation:  Plan of Care Reviewed With: patient        Progress: no change  Outcome Evaluation: Patient is alert and oriented x4. Voiced frustration today becuase he is not progressing and therapy is discharging him from services. Con't to c/o severe pain in his left hip. Given Baclofen at 0727 and Percocet at 1134. Patient stated Baclofen didn't help much but percocet was effective. Refused lunch today. Stated he \"might as well give up\". Patient encouraged to continue to do exercises while in bed and keep working toward his goal. Blood glucose at 1715 was 73. Patient asked for and was given ice cream. Voices needs. Con't POC.         "

## 2023-12-27 NOTE — THERAPY TREATMENT NOTE
Acute Care - Physical Therapy Re-Evaluation and Discharge summary  KEO Dominguez     Patient Name: Jose Shaikh  : 1960  MRN: [id number]  Today's Date: 2023     Admit date: 2023     Referring Physician: Cailin Acosta MD     Surgery Date:* No surgery found *             Visit Dx:     ICD-10-CM ICD-9-CM   1. Difficulty in walking  R26.2 719.7     Patient Active Problem List   Diagnosis    Diabetic ulcer of left heel associated with type 2 DM    Acute osteomyelitis of left calcaneus     Diabetic ulcer of left heel associated with type 2 DM    Diabetic ulcer of right midfoot associated with type 2 DM    Paroxysmal atrial fibrillation    Essential hypertension    Hyperlipidemia LDL goal <100    Cellulitis and abscess of foot    High alkaline phosphatase level    Osteomyelitis    Onychomycosis    Onychocryptosis    Foot pain, bilateral    Osteomyelitis of foot, right, acute    Cellulitis of right foot    Type 2 diabetes mellitus, with long-term current use of insulin    Class 3 severe obesity due to excess calories with serious comorbidity and body mass index (BMI) of 45.0 to 49.9 in adult    Anxiety disorder, unspecified    Claustrophobia    Dependence on wheelchair    Depression, unspecified    Long term (current) use of anticoagulants    Long term (current) use of oral hypoglycemic drugs    Wound of foot    Non-prs chronic ulcer oth prt r foot limited to brkdwn skin    Orthostatic hypotension    Other chronic osteomyelitis, right ankle and foot    Personal history of nicotine dependence    Thrombocytopenia, unspecified    Unspecified open wound, right foot, initial encounter    Diabetic foot infection    Subacute osteomyelitis of right foot    Right foot pain    Sepsis    Onychomycosis    Foot pain, left    Impaired mobility and ADLs    Absence of toe of right foot    Corns and callosity    Disability of walking    Fracture    Limb swelling    Polyneuropathy    Pressure ulcer, stage 1    Shortness  of breath    Generalized weakness    Debility     Past Medical History:   Diagnosis Date    Absence of toe of right foot     Acute osteomyelitis of left calcaneus  08/18/2021    Anxiety and depression     Arthritis     Cancer     Claustrophobia     Corns and callus     Diabetic ulcer of left heel associated with type 2 DM 08/18/2021    Diabetic ulcer of left heel associated with type 2 DM 07/06/2021    Diabetic ulcer of right midfoot associated with type 2 DM 08/18/2021    Difficulty walking     Essential hypertension 08/31/2021    Hammertoe     Hyperlipidemia LDL goal <100 08/31/2021    Ingrown toenail     Obesity     Paroxysmal atrial fibrillation 08/31/2021    Polyneuropathy     Pressure ulcer, stage 1     Tinea unguium     Type 2 diabetes mellitus with polyneuropathy      Past Surgical History:   Procedure Laterality Date    CYST REMOVAL      center of back; benign    EYE SURGERY      INCISION AND DRAINAGE ABSCESS      back    INCISION AND DRAINAGE LEG Right 12/10/2021    Procedure: INCISION AND DRAINAGE LOWER EXTREMITY;  Surgeon: Ash Leyva DPM;  Location: [facility] MAIN OR;  Service: Podiatry;  Laterality: Right;    OTHER SURGICAL HISTORY      Surgical clips left foot    TOE SURGERY Right     Removal of 5th toe    TRANS METATARSAL AMPUTATION Right 12/02/2021    Procedure: AMPUTATION TRANS METATARSAL;  Surgeon: Ash Leyva DPM;  Location: [facility] MAIN OR;  Service: Podiatry;  Laterality: Right;    VASCULAR SURGERY      WRIST SURGERY Left     repair of injury     PT Assessment (last 12 hours)       PT Evaluation and Treatment       Row Name 12/27/23 1300          Physical Therapy Time and Intention    Subjective Information no complaints  -MOE     Document Type discharge evaluation/summary  -MOE     Mode of Treatment individual therapy;physical therapy  -MOE     Patient Effort good  -MOE       Row Name 12/27/23 1300          Bed Mobility    Bed Mobility bed mobility (all) activities;supine-sit   -MOE     All Activities, [place] (Bed Mobility) contact guard  -MOE     Supine-Sit [place] (Bed Mobility) minimum assist (75% patient effort)  -MOE       Row Name 12/27/23 1300          Sit-Stand Transfer    Sit-Stand [place] (Transfers) moderate assist (50% patient effort)  -MOE     Assistive Device (Sit-Stand Transfers) walker, front-wheeled  -MOE       Row Name 12/27/23 1300          Gait/Stairs (Locomotion)    Gait/Stairs Locomotion gait/ambulation assistive device  -MOE     [place] Level (Gait) minimum assist (75% patient effort)  -MOE     Assistive Device (Gait) walker, front-wheeled  -MOE     Distance in Feet (Gait) 2  -MOE       Row Name 12/27/23 1300          Safety Issues, Functional Mobility    Impairments Affecting Function (Mobility) balance;endurance/activity tolerance;pain;range of motion (ROM);strength  -MOE       Row Name 12/27/23 1300          Balance    Dynamic Standing Balance minimal assist  -MOE     Position/Device Used, Standing Balance walker, front-wheeled  -MOE       Row Name             Wound 11/07/23 1240 Right anterior foot    Wound - Properties Group Placement Date: 11/07/23  -FH Placement Time: 1240  -FH Side: Right  -FH Orientation: anterior  -FH Location: foot  -FH    Retired Wound - Properties Group Placement Date: 11/07/23  -FH Placement Time: 1240  -FH Side: Right  -FH Orientation: anterior  -FH Location: foot  -FH    Retired Wound - Properties Group Date first assessed: 11/07/23  -FH Time first assessed: 1240  -FH Side: Right  -FH Location: foot  -FH      Row Name 12/27/23 1300          Plan of Care Review    Plan of Care Reviewed With patient  -MOE     Outcome Evaluation Pt is being d/c from PT caseload due to lack of progress.  Since the last reevaluation pt has not progressed toward his goals and remains assist of 2 for all transfers and ambualtion.  This level of mobility is unchanged from the initial evaluation and further PT services cannot be justified due to his  plateau. Pt will be d/c from PT services at this time.  -MOE       Row Name 12/27/23 1300          Progress Summary (PT)    Progress Toward Functional Goals (PT) unable to show any progress toward functional goals  -MOE       Row Name 12/27/23 1300          Bed Mobility Goal 1 (PT)    Activity/Assistive Device (Bed Mobility Goal 1, PT) bed mobility activities, all  -MOE     [place] Level/Cues Needed (Bed Mobility Goal 1, PT) independent  -MOE     Time Frame (Bed Mobility Goal 1, PT) 30 days;long term goal (LTG)  -MOE     Progress/Outcomes (Bed Mobility Goal 1, PT) unable to make needed progress  -MOE       Row Name 12/27/23 1300          Transfer Goal 1 (PT)    Activity/Assistive Device (Transfer Goal 1, PT) transfers, all  -MOE     [place] Level/Cues Needed (Transfer Goal 1, PT) independent  -MOE     Time Frame (Transfer Goal 1, PT) long term goal (LTG);30 days  -MOE     Progress/Outcome (Transfer Goal 1, PT) unable to make needed progress  -MOE       Row Name 12/27/23 1300          Gait Training Goal 1 (PT)    Activity/Assistive Device (Gait Training Goal 1, PT) gait (walking locomotion);assistive device use;walker, rolling  -MOE     [place] Level (Gait Training Goal 1, PT) independent  -MOE     Distance (Gait Training Goal 1, PT) 10  -MOE     Time Frame (Gait Training Goal 1, PT) long term goal (LTG);30 days  -MOE     Progress/Outcome (Gait Training Goal 1, PT) unable to make needed progress  -MOE       Row Name 12/27/23 1300          Strength Goal 1 (PT)    Strength Goal 1 (PT) Patient will demonstrate improvement in gross bilateral lower extremity strength to 3+/5  -MOE     Time Frame (Strength Goal 1, PT) long term goal (LTG);30 days  -MOE     Progress/Outcome (Strength Goal 1, PT) unable to make needed progress  -MOE               User Key  (r) = Recorded By, (t) = Taken By, (c) = Cosigned By      Initials Name Provider Type    Carlos Anguiano, RN Registered Nurse    Merlin De La O, PT Physical  Therapist                    Physical Therapy Education       Title: PT OT SLP Therapies (Done)       Topic: Physical Therapy (Done)       Point: Mobility training (Done)       Learning Progress Summary             Patient Acceptance, E, VU by CR at 12/20/2023 1345    Acceptance, E, VU by CR at 12/19/2023 1004    Acceptance, E,TB, VU by RM at 11/30/2023 0420    Acceptance, E,TB, VU by MOE at 11/8/2023 1341                         Point: Precautions (Done)       Learning Progress Summary             Patient Acceptance, E,TB, VU by RM at 11/30/2023 0420    Acceptance, E,TB, VU by MOE at 11/8/2023 1341                                         User Key       Initials Effective Dates Name Provider Type Discipline     06/08/21 -  Bharath Berg RN Registered Nurse Nurse    CR 06/13/22 -  Adilson Baker LPN Licensed Nurse Nurse    MOE 06/03/21 -  Merlin Rao PT Physical Therapist PT                  PT Recommendation and Plan  Anticipated Discharge Disposition (PT): home with home health  Planned Therapy Interventions (PT): bed mobility training, balance training, gait training, joint mobilization, home exercise program, stair training, strengthening, transfer training  Therapy Frequency (PT): 6 times/wk  Progress Summary (PT)  Progress Toward Functional Goals (PT): unable to show any progress toward functional goals  Daily Progress Summary (PT): Pt has made little to no progress toward his goals.  He is still very weak and will require skilled PT services to address his mobility issues but he will need to show more effort and progress druing this reevaluation period to continue to be appropriate.  Will continue current plan another 30 day period.  Pt was instructed that if no progress is made that we will have no choice but to d/c services due to lack of progress.  Plan of Care Reviewed With: patient  Outcome Evaluation: Pt is being d/c from PT caseload due to lack of progress.  Since the last reevaluation pt has not  progressed toward his goals and remains assist of 2 for all transfers and ambualtion.  This level of mobility is unchanged from the initial evaluation and further PT services cannot be justified due to his plateau. Pt will be d/c from PT services at this time.   Outcome Measures       Row Name 12/27/23 1300 12/26/23 1300          How much help from another person do you currently need...    Turning from your back to your side while in flat bed without using bedrails? 3  -MOE 3  -RH     Moving from lying on back to sitting on the side of a flat bed without bedrails? 2  -MOE 2  -RH     Moving to and from a bed to a chair (including a wheelchair)? 1  -MOE 1  -RH     Standing up from a chair using your arms (e.g., wheelchair, bedside chair)? 2  -MOE 2  -RH     Climbing 3-5 steps with a railing? 1  -MOE 1  -RH     To walk in hospital room? 2  -MOE 2  -RH     AM-PAC 6 Clicks Score (PT) 11  -MOE 11  -RH     Highest Level of Mobility Goal 4 --> Transfer to chair/commode  -MOE 4 --> Transfer to chair/commode  -RH        Functional Assessment    Outcome Measure Options AM-PAC 6 Clicks Basic Mobility (PT)  -MOE --               User Key  (r) = Recorded By, (t) = Taken By, (c) = Cosigned By      Initials Name Provider Type    Bijan Seo, PTA Physical Therapist Assistant    Merlin De La O PT Physical Therapist                     Time Calculation:    PT Charges       Row Name 12/27/23 1336             Time Calculation    PT Received On 12/27/23  -MOE         Untimed Charges    PT Eval/Re-eval Minutes 15  -MOE         Total Minutes    Untimed Charges Total Minutes 15  -MOE       Total Minutes 15  -MOE                User Key  (r) = Recorded By, (t) = Taken By, (c) = Cosigned By      Initials Name Provider Type    Merlin De La O PT Physical Therapist                  Therapy Charges for Today       Code Description Service Date Service Provider Modifiers Qty    14425896653  PT RE-EVAL ESTABLISHED PLAN 2 12/27/2023 Jalen  Merlin BRIONES, PT GP 1            PT G-Codes  Outcome Measure Options: AM-PAC 6 Clicks Basic Mobility (PT)  AM-PAC 6 Clicks Score (PT): 11  AM-PAC 6 Clicks Score (OT): 14    Merlin Rao, PT  12/27/2023

## 2023-12-27 NOTE — THERAPY DISCHARGE NOTE
SNF - Occupational Therapy Discharge   [place]    Patient Name: Jose Shaikh  : 1960    MRN: [id number]                              Today's Date: 2023       Admit Date: 2023    Visit Dx:     ICD-10-CM ICD-9-CM   1. Difficulty in walking  R26.2 719.7     Patient Active Problem List   Diagnosis    Diabetic ulcer of left heel associated with type 2 DM    Acute osteomyelitis of left calcaneus     Diabetic ulcer of left heel associated with type 2 DM    Diabetic ulcer of right midfoot associated with type 2 DM    Paroxysmal atrial fibrillation    Essential hypertension    Hyperlipidemia LDL goal <100    Cellulitis and abscess of foot    High alkaline phosphatase level    Osteomyelitis    Onychomycosis    Onychocryptosis    Foot pain, bilateral    Osteomyelitis of foot, right, acute    Cellulitis of right foot    Type 2 diabetes mellitus, with long-term current use of insulin    Class 3 severe obesity due to excess calories with serious comorbidity and body mass index (BMI) of 45.0 to 49.9 in adult    Anxiety disorder, unspecified    Claustrophobia    Dependence on wheelchair    Depression, unspecified    Long term (current) use of anticoagulants    Long term (current) use of oral hypoglycemic drugs    Wound of foot    Non-prs chronic ulcer oth prt r foot limited to brkdwn skin    Orthostatic hypotension    Other chronic osteomyelitis, right ankle and foot    Personal history of nicotine dependence    Thrombocytopenia, unspecified    Unspecified open wound, right foot, initial encounter    Diabetic foot infection    Subacute osteomyelitis of right foot    Right foot pain    Sepsis    Onychomycosis    Foot pain, left    Impaired mobility and ADLs    Absence of toe of right foot    Corns and callosity    Disability of walking    Fracture    Limb swelling    Polyneuropathy    Pressure ulcer, stage 1    Shortness of breath    Generalized weakness    Debility     Past Medical History:   Diagnosis Date     Absence of toe of right foot     Acute osteomyelitis of left calcaneus  08/18/2021    Anxiety and depression     Arthritis     Cancer     Claustrophobia     Corns and callus     Diabetic ulcer of left heel associated with type 2 DM 08/18/2021    Diabetic ulcer of left heel associated with type 2 DM 07/06/2021    Diabetic ulcer of right midfoot associated with type 2 DM 08/18/2021    Difficulty walking     Essential hypertension 08/31/2021    Hammertoe     Hyperlipidemia LDL goal <100 08/31/2021    Ingrown toenail     Obesity     Paroxysmal atrial fibrillation 08/31/2021    Polyneuropathy     Pressure ulcer, stage 1     Tinea unguium     Type 2 diabetes mellitus with polyneuropathy      Past Surgical History:   Procedure Laterality Date    CYST REMOVAL      center of back; benign    EYE SURGERY      INCISION AND DRAINAGE ABSCESS      back    INCISION AND DRAINAGE LEG Right 12/10/2021    Procedure: INCISION AND DRAINAGE LOWER EXTREMITY;  Surgeon: Ash Leyva DPM;  Location: [facility];  Service: Podiatry;  Laterality: Right;    OTHER SURGICAL HISTORY      Surgical clips left foot    TOE SURGERY Right     Removal of 5th toe    TRANS METATARSAL AMPUTATION Right 12/02/2021    Procedure: AMPUTATION TRANS METATARSAL;  Surgeon: Ash Leyva DPM;  Location: [facility] OR;  Service: Podiatry;  Laterality: Right;    VASCULAR SURGERY      WRIST SURGERY Left     repair of injury      General Information       Row Name 12/27/23 1101          OT Time and Intention    Document Type discharge treatment  -PG     Mode of Treatment individual therapy;occupational therapy  -PG               User Key  (r) = Recorded By, (t) = Taken By, (c) = Cosigned By      Initials Name Provider Type    PG Kodak Matos OT Occupational Therapist                   Mobility/ADL's       Row Name 12/27/23 6471          Sit-Stand Transfer    Sit-Stand [place] (Transfers) moderate assist (50% patient effort);2 person assist   -PG     Assistive Device (Sit-Stand Transfers) bariatric;walker, front-wheeled  -PG       Row Name 12/27/23 1108          Stand-Sit Transfer    Stand-Sit Wallace (Transfers) moderate assist (50% patient effort);2 person assist  -PG     Assistive Device (Stand-Sit Transfers) bariatric;walker, front-wheeled  -PG               User Key  (r) = Recorded By, (t) = Taken By, (c) = Cosigned By      Initials Name Provider Type    PG Kodak Matos, OT Occupational Therapist                   Obj/Interventions    No documentation.                  Goals/Plan       Row Name 12/27/23 1108          Transfer Goal 1 (OT)    Progress/Outcome (Transfer Goal 1, OT) goal not met  -PG       Row Name 12/27/23 1108          Bathing Goal 1 (OT)    Progress/Outcomes (Bathing Goal 1, OT) goal not met  -PG       Row Name 12/27/23 1108          Dressing Goal 1 (OT)    Progress/Outcome (Dressing Goal 1, OT) goal not met  -PG       Row Name 12/27/23 1108          Toileting Goal 1 (OT)    Progress/Outcome (Toileting Goal 1, OT) goal not met  -PG       Row Name 12/27/23 1108          Grooming Goal 1 (OT)    Progress/Outcome (Grooming Goal 1, OT) goal not met  -PG       Row Name 12/27/23 1108          Strength Goal 1 (OT)    Progress/Outcome (Strength Goal 1, OT) goal not met  -PG       Row Name 12/27/23 1108          Problem Specific Goal 1 (OT)    Progress/Outcome (Problem Specific Goal 1, OT) goal not met  -PG               User Key  (r) = Recorded By, (t) = Taken By, (c) = Cosigned By      Initials Name Provider Type    PG Kodak Matos, OT Occupational Therapist                   Clinical Impression       Row Name 12/27/23 1109          Plan of Care Review    Plan of Care Reviewed With patient  -PG     Progress no change  -PG     Outcome Evaluation Patient seen for his last occupational therapy session due to lack of progress.  Patient did not meet any short-term or long-term goals and continues to remain dependent for bathing, dressing  and toileting activities.  He is able to complete sit to stand transfers with mod assist x 2 but unable to complete bedside commode transfer at this time.  His status has remained unchanged since his initial evaluation approximately 2 months ago.  Patient remains bedridden and total dependence with ADLs.  Due to his lack of progression, no additional occupational therapy can be justified at this time.  Patient is being discharged from occupational therapy services  -PG       Row Name 12/27/23 1109          Therapy Assessment/Plan (OT)    Criteria for Skilled Therapeutic Interventions Met (OT) does not meet criteria for skilled intervention  -PG       Row Name 12/27/23 1109          Therapy Plan Review/Discharge Plan (OT)    Anticipated Discharge Disposition (OT) extended care facility  -               User Key  (r) = Recorded By, (t) = Taken By, (c) = Cosigned By      Initials Name Provider Type    Kodak Velazco, OT Occupational Therapist                   Outcome Measures    No documentation.                 Section G              Section GG  SectionGG: Functional Ability/Goals, Adm  Self Care, Prior Functioning (DY9485R): 1. Dependent  Upper Extremity Range of Motion (NJ8308X): No impairment  Self Care, Admission (Section GG)  Eating: Self-Care Admission Performance (KH1642A7): independent (06)  Oral Hygiene: Self-Care Admission Performance (HW1340O0): independent (06)  Toileting Hygiene: Self-Care Admission Performance (BG4344U6): dependent (01)  Shower/Bathe Self: Self-Care Admission Performance (MS9608G3): substantial/maximal assistance (02)  Upper Body Dressing: Self-Care Admission Performance (TX9763T3): partial/moderate assistance (03)  Lower Body Dressing: Self-Care Admission Performance (RT6764S8): dependent (01)  Putting On/Taking Off Footwear: Self-Care Admission Performance (TW6121Q8): dependent (01)  Personal Hygiene: Self-Care Admission Performance (GE6253J7): dependent (01)  Mobility, Admission  Performance (TN3144)  Toilet Transfer: Mobility Admission Performance (ZH5390U3): not attempted, medical condition/safety concern (88)  Section GG: Functional Ability/Goals, DC  Eating: Self-Care Discharge Goal (NP2941H7): independent (06)  Oral Hygiene: Self-Care Discharge Goal (ER8349R5): independent (06)  Shower/Bathe Self: Self-Care Discharge Goal (SC2164X4): supervision or touching assistance (04)  Upper Body Dressing: Self-Care Discharge Goal (VR3582Q1): setup or clean-up assistance (05)  Lower Body Dressing: Self-Care Discharge Goal (ZM2213R4): supervision or touching assistance (04)  Putting On/Taking Off Footwear: Self-Care Discharge Goal (ZK1379L4): supervision or touching assistance (04)  Personal Hygiene: Self-Care Discharge Goal (TD4580B0): supervision or touching assistance (04)  Mobility (GG), Discharge Goal (LE2941)  Toilet Transfer: Mobility Discharge Goal (OK5490D4): supervision or touching assistance (04)          Occupational Therapy Education       Title: PT OT SLP Therapies (Done)       Topic: Occupational Therapy (Done)       Point: ADL training (Done)       Description:   Instruct learner(s) on proper safety adaptation and remediation techniques during self care or transfers.   Instruct in proper use of assistive devices.                  Learning Progress Summary             Patient Acceptance, E,TB, VU by  at 11/30/2023 0420    Acceptance, E,D, DU by PG at 11/7/2023 0932                         Point: Home exercise program (Done)       Description:   Instruct learner(s) on appropriate technique for monitoring, assisting and/or progressing therapeutic exercises/activities.                  Learning Progress Summary             Patient Acceptance, E,TB, VU by  at 11/30/2023 0420    Acceptance, E,D, DU by PG at 11/7/2023 0932                         Point: Precautions (Done)       Description:   Instruct learner(s) on prescribed precautions during self-care and functional transfers.                   Learning Progress Summary             Patient Acceptance, E,TB, VU by  at 11/30/2023 0420    Acceptance, E,D, DU by PG at 11/7/2023 0932                         Point: Body mechanics (Done)       Description:   Instruct learner(s) on proper positioning and spine alignment during self-care, functional mobility activities and/or exercises.                  Learning Progress Summary             Patient Acceptance, E,TB, VU by  at 11/30/2023 0420    Acceptance, E,D, DU by PG at 11/7/2023 0932                                         User Key       Initials Effective Dates Name Provider Type Discipline     06/08/21 -  Bharath Berg RN Registered Nurse Nurse    PG 06/16/21 -  Kodak Matos OT Occupational Therapist OT                  OT Recommendation and Plan  Planned Therapy Interventions (OT): activity tolerance training, BADL retraining, strengthening exercise, transfer/mobility retraining, patient/caregiver education/training, occupation/activity based interventions  Therapy Frequency (OT): 5 times/wk  Plan of Care Review  Plan of Care Reviewed With: patient  Progress: no change  Outcome Evaluation: Patient seen for his last occupational therapy session due to lack of progress.  Patient did not meet any short-term or long-term goals and continues to remain dependent for bathing, dressing and toileting activities.  He is able to complete sit to stand transfers with mod assist x 2 but unable to complete bedside commode transfer at this time.  His status has remained unchanged since his initial evaluation approximately 2 months ago.  Patient remains bedridden and total dependence with ADLs.  Due to his lack of progression, no additional occupational therapy can be justified at this time.  Patient is being discharged from occupational therapy services  Plan of Care Reviewed With: patient  Outcome Evaluation: Patient seen for his last occupational therapy session due to lack of progress.  Patient  did not meet any short-term or long-term goals and continues to remain dependent for bathing, dressing and toileting activities.  He is able to complete sit to stand transfers with mod assist x 2 but unable to complete bedside commode transfer at this time.  His status has remained unchanged since his initial evaluation approximately 2 months ago.  Patient remains bedridden and total dependence with ADLs.  Due to his lack of progression, no additional occupational therapy can be justified at this time.  Patient is being discharged from occupational therapy services     Time Calculation:   Evaluation Complexity (OT)  Review Occupational Profile/Medical/Therapy History Complexity: brief/low complexity  Assessment, Occupational Performance/Identification of Deficit Complexity: 3-5 performance deficits  Clinical Decision Making Complexity (OT): problem focused assessment/low complexity  Overall Complexity of Evaluation (OT): low complexity     Time Calculation- OT       Row Name 12/27/23 1114             Time Calculation- OT    OT Received On 12/27/23  -PG         Timed Charges    38044 - OT Therapeutic Activity Minutes 15  -PG         SNF Occupational Therapy Minutes    Skilled Minutes- OT 15 min  -PG         Total Minutes    Timed Charges Total Minutes 15  -PG       Total Minutes 15  -PG                User Key  (r) = Recorded By, (t) = Taken By, (c) = Cosigned By      Initials Name Provider Type    PG Kodak Matos OT Occupational Therapist                  Therapy Charges for Today       Code Description Service Date Service Provider Modifiers Qty    [id number]  OT THERAPEUTIC ACT EA 15 MIN 12/27/2023 Kodak Matos OT GO 1                 Kodak Matos OT  12/27/2023

## 2023-12-28 LAB
ALBUMIN SERPL-MCNC: 3.2 G/DL (ref 3.5–5.2)
ANION GAP SERPL CALCULATED.3IONS-SCNC: 10.6 MMOL/L (ref 5–15)
BUN SERPL-MCNC: 18 MG/DL (ref 8–23)
BUN/CREAT SERPL: 36.7 (ref 7–25)
CALCIUM SPEC-SCNC: 8.5 MG/DL (ref 8.6–10.5)
CHLORIDE SERPL-SCNC: 106 MMOL/L (ref 98–107)
CO2 SERPL-SCNC: 22.4 MMOL/L (ref 22–29)
CREAT SERPL-MCNC: 0.49 MG/DL (ref 0.76–1.27)
DEPRECATED RDW RBC AUTO: 66.8 FL (ref 37–54)
EGFRCR SERPLBLD CKD-EPI 2021: 113.9 ML/MIN/1.73
ERYTHROCYTE [DISTWIDTH] IN BLOOD BY AUTOMATED COUNT: 23.3 % (ref 12.3–15.4)
GLUCOSE BLDC GLUCOMTR-MCNC: 121 MG/DL (ref 70–99)
GLUCOSE BLDC GLUCOMTR-MCNC: 224 MG/DL (ref 70–99)
GLUCOSE BLDC GLUCOMTR-MCNC: 225 MG/DL (ref 70–99)
GLUCOSE BLDC GLUCOMTR-MCNC: 82 MG/DL (ref 70–99)
GLUCOSE BLDC GLUCOMTR-MCNC: 99 MG/DL (ref 70–99)
GLUCOSE SERPL-MCNC: 110 MG/DL (ref 65–99)
HCT VFR BLD AUTO: 37.9 % (ref 37.5–51)
HGB BLD-MCNC: 11.5 G/DL (ref 13–17.7)
MCH RBC QN AUTO: 24.3 PG (ref 26.6–33)
MCHC RBC AUTO-ENTMCNC: 30.3 G/DL (ref 31.5–35.7)
MCV RBC AUTO: 80 FL (ref 79–97)
PHOSPHATE SERPL-MCNC: 3.9 MG/DL (ref 2.5–4.5)
PLATELET # BLD AUTO: 77 10*3/MM3 (ref 140–450)
PMV BLD AUTO: ABNORMAL FL
POTASSIUM SERPL-SCNC: 4 MMOL/L (ref 3.5–5.2)
RBC # BLD AUTO: 4.74 10*6/MM3 (ref 4.14–5.8)
SARS-COV-2 RNA RESP QL NAA+PROBE: NOT DETECTED
SODIUM SERPL-SCNC: 139 MMOL/L (ref 136–145)
WBC NRBC COR # BLD AUTO: 4.04 10*3/MM3 (ref 3.4–10.8)

## 2023-12-28 PROCEDURE — 80069 RENAL FUNCTION PANEL: CPT | Performed by: PHYSICIAN ASSISTANT

## 2023-12-28 PROCEDURE — 63710000001 INSULIN DETEMIR PER 5 UNITS: Performed by: PHYSICIAN ASSISTANT

## 2023-12-28 PROCEDURE — 82948 REAGENT STRIP/BLOOD GLUCOSE: CPT

## 2023-12-28 PROCEDURE — 85027 COMPLETE CBC AUTOMATED: CPT | Performed by: PHYSICIAN ASSISTANT

## 2023-12-28 PROCEDURE — 63710000001 INSULIN LISPRO (HUMAN) PER 5 UNITS: Performed by: INTERNAL MEDICINE

## 2023-12-28 PROCEDURE — 87635 SARS-COV-2 COVID-19 AMP PRB: CPT | Performed by: PHYSICIAN ASSISTANT

## 2023-12-28 RX ADMIN — Medication 250 MG: at 08:38

## 2023-12-28 RX ADMIN — INSULIN LISPRO 8 UNITS: 100 INJECTION, SOLUTION INTRAVENOUS; SUBCUTANEOUS at 20:08

## 2023-12-28 RX ADMIN — CYANOCOBALAMIN TAB 500 MCG 1000 MCG: 500 TAB at 08:38

## 2023-12-28 RX ADMIN — Medication 250 MG: at 20:08

## 2023-12-28 RX ADMIN — INSULIN DETEMIR 70 UNITS: 100 INJECTION, SOLUTION SUBCUTANEOUS at 08:37

## 2023-12-28 RX ADMIN — SERTRALINE HYDROCHLORIDE 25 MG: 25 TABLET ORAL at 20:09

## 2023-12-28 RX ADMIN — FERROUS SULFATE TAB 325 MG (65 MG ELEMENTAL FE) 325 MG: 325 (65 FE) TAB at 08:38

## 2023-12-28 RX ADMIN — APIXABAN 5 MG: 5 TABLET, FILM COATED ORAL at 08:38

## 2023-12-28 RX ADMIN — INSULIN LISPRO 8 UNITS: 100 INJECTION, SOLUTION INTRAVENOUS; SUBCUTANEOUS at 17:43

## 2023-12-28 RX ADMIN — PREGABALIN 100 MG: 100 CAPSULE ORAL at 16:43

## 2023-12-28 RX ADMIN — EMPAGLIFLOZIN 10 MG: 10 TABLET, FILM COATED ORAL at 08:38

## 2023-12-28 RX ADMIN — BACLOFEN 10 MG: 10 TABLET ORAL at 07:19

## 2023-12-28 RX ADMIN — OXYCODONE AND ACETAMINOPHEN 1 TABLET: 7.5; 325 TABLET ORAL at 21:05

## 2023-12-28 RX ADMIN — Medication 10 MG: at 20:08

## 2023-12-28 RX ADMIN — MICONAZOLE NITRATE 1 APPLICATION: 2 POWDER TOPICAL at 08:39

## 2023-12-28 RX ADMIN — PREGABALIN 100 MG: 100 CAPSULE ORAL at 08:38

## 2023-12-28 RX ADMIN — OXYCODONE AND ACETAMINOPHEN 1 TABLET: 7.5; 325 TABLET ORAL at 03:04

## 2023-12-28 RX ADMIN — ATORVASTATIN CALCIUM 10 MG: 10 TABLET, FILM COATED ORAL at 20:08

## 2023-12-28 RX ADMIN — PREGABALIN 100 MG: 100 CAPSULE ORAL at 20:08

## 2023-12-28 RX ADMIN — OXYCODONE AND ACETAMINOPHEN 1 TABLET: 7.5; 325 TABLET ORAL at 13:44

## 2023-12-28 RX ADMIN — APIXABAN 5 MG: 5 TABLET, FILM COATED ORAL at 20:08

## 2023-12-28 RX ADMIN — INSULIN DETEMIR 60 UNITS: 100 INJECTION, SOLUTION SUBCUTANEOUS at 20:08

## 2023-12-28 RX ADMIN — MICONAZOLE NITRATE: 2 POWDER TOPICAL at 20:09

## 2023-12-28 RX ADMIN — LISINOPRIL 2.5 MG: 2.5 TABLET ORAL at 08:38

## 2023-12-28 NOTE — PLAN OF CARE
Goal Outcome Evaluation:  Plan of Care Reviewed With: patient        Progress: declining  Outcome Evaluation: Alert and oriented; able to make needs known to staff. Baclofen administered x1 and Percocet x2 for complaints of left hip pain. Incontinent of urine and stool tonight. Utilizes urinal at times. Call light within reach; care plan ongoing.

## 2023-12-28 NOTE — PLAN OF CARE
"Goal Outcome Evaluation:  Plan of Care Reviewed With: patient        Progress: no change  Outcome Evaluation: Patient is alert and oriented. Refused turns multiple times during shift. Refused to allow nurse to apply barrier cream to buttocks, and powder to abdominal folds this afternoon. C/O severe left hip pain at 0719 and was given baclofen by off-going nurse. Med somewhat effective. C/O severe left hip pain again at 1344 and was given PRN Percocet. Med more effective. Care plan meeting with patient occurred this morning and included PT. , MDS, nurse,  and . After meeting, patient voiced frustration and sadness and stated \"I'm giving up. There's no use\". Patient encouraged to keep working on exercises while in the bed and to reach out to family and friends.  services consulted and facility  came to see patient as well. Today's routine covid test resulted as virus not detected. Voices needs.         "

## 2023-12-28 NOTE — CONSULTS
12/28/23 1035   Coping/Psychosocial   Verbalized Emotional State frustration;hopelessness;powerlessness   Trust Relationship/Rapport emotional support provided;empathic listening provided   Additional Documentation Spiritual Care (Group)   Spiritual Care   Spiritual Care Visit Type follow-up   Spiritual Care Source nurse referral   Receptivity to Spiritual Care visit welcomed   Spiritual Care Interventions theological discussion provided;supportive conversation provided   Response to Spiritual Care emotion expressed;engaged in conversation;expressing self, indicated difficulty   Use of Spiritual Resources spiritual issues, indicated struggles   Spiritual Care Follow-Up will follow closely   Spiritual Care Request coping/stress of illness support;spiritual/moral support

## 2023-12-29 LAB
GLUCOSE BLDC GLUCOMTR-MCNC: 101 MG/DL (ref 70–99)
GLUCOSE BLDC GLUCOMTR-MCNC: 171 MG/DL (ref 70–99)
GLUCOSE BLDC GLUCOMTR-MCNC: 185 MG/DL (ref 70–99)
GLUCOSE BLDC GLUCOMTR-MCNC: 193 MG/DL (ref 70–99)

## 2023-12-29 PROCEDURE — 63710000001 INSULIN DETEMIR PER 5 UNITS: Performed by: PHYSICIAN ASSISTANT

## 2023-12-29 PROCEDURE — 82948 REAGENT STRIP/BLOOD GLUCOSE: CPT

## 2023-12-29 PROCEDURE — 63710000001 INSULIN LISPRO (HUMAN) PER 5 UNITS: Performed by: INTERNAL MEDICINE

## 2023-12-29 RX ADMIN — BACLOFEN 10 MG: 10 TABLET ORAL at 00:54

## 2023-12-29 RX ADMIN — BACLOFEN 10 MG: 10 TABLET ORAL at 16:06

## 2023-12-29 RX ADMIN — MICONAZOLE NITRATE: 2 POWDER TOPICAL at 20:02

## 2023-12-29 RX ADMIN — Medication 250 MG: at 20:00

## 2023-12-29 RX ADMIN — APIXABAN 5 MG: 5 TABLET, FILM COATED ORAL at 20:01

## 2023-12-29 RX ADMIN — INSULIN DETEMIR 70 UNITS: 100 INJECTION, SOLUTION SUBCUTANEOUS at 08:50

## 2023-12-29 RX ADMIN — INSULIN LISPRO 4 UNITS: 100 INJECTION, SOLUTION INTRAVENOUS; SUBCUTANEOUS at 07:57

## 2023-12-29 RX ADMIN — INSULIN DETEMIR 60 UNITS: 100 INJECTION, SOLUTION SUBCUTANEOUS at 20:03

## 2023-12-29 RX ADMIN — CYANOCOBALAMIN TAB 500 MCG 1000 MCG: 500 TAB at 08:50

## 2023-12-29 RX ADMIN — Medication 250 MG: at 08:50

## 2023-12-29 RX ADMIN — Medication 10 MG: at 20:01

## 2023-12-29 RX ADMIN — MICONAZOLE NITRATE: 2 POWDER TOPICAL at 08:51

## 2023-12-29 RX ADMIN — INSULIN LISPRO 4 UNITS: 100 INJECTION, SOLUTION INTRAVENOUS; SUBCUTANEOUS at 17:45

## 2023-12-29 RX ADMIN — APIXABAN 5 MG: 5 TABLET, FILM COATED ORAL at 08:50

## 2023-12-29 RX ADMIN — OXYCODONE AND ACETAMINOPHEN 1 TABLET: 7.5; 325 TABLET ORAL at 04:45

## 2023-12-29 RX ADMIN — LISINOPRIL 2.5 MG: 2.5 TABLET ORAL at 08:50

## 2023-12-29 RX ADMIN — PREGABALIN 100 MG: 100 CAPSULE ORAL at 20:02

## 2023-12-29 RX ADMIN — PREGABALIN 100 MG: 100 CAPSULE ORAL at 16:06

## 2023-12-29 RX ADMIN — FERROUS SULFATE TAB 325 MG (65 MG ELEMENTAL FE) 325 MG: 325 (65 FE) TAB at 07:56

## 2023-12-29 RX ADMIN — SERTRALINE HYDROCHLORIDE 25 MG: 25 TABLET ORAL at 20:01

## 2023-12-29 RX ADMIN — EMPAGLIFLOZIN 10 MG: 10 TABLET, FILM COATED ORAL at 08:50

## 2023-12-29 RX ADMIN — ATORVASTATIN CALCIUM 10 MG: 10 TABLET, FILM COATED ORAL at 20:02

## 2023-12-29 RX ADMIN — PREGABALIN 100 MG: 100 CAPSULE ORAL at 08:50

## 2023-12-29 RX ADMIN — INSULIN LISPRO 4 UNITS: 100 INJECTION, SOLUTION INTRAVENOUS; SUBCUTANEOUS at 20:03

## 2023-12-29 RX ADMIN — OXYCODONE AND ACETAMINOPHEN 1 TABLET: 7.5; 325 TABLET ORAL at 10:53

## 2023-12-29 RX ADMIN — OXYCODONE AND ACETAMINOPHEN 1 TABLET: 7.5; 325 TABLET ORAL at 20:01

## 2023-12-29 NOTE — PLAN OF CARE
Goal Outcome Evaluation:   Alert and oriented and pleasant with staff. X2 max assist for transfers and ambulation. X2 admin PRN pain medication, with relief of symptoms noted. Noted improvement with wound healing to Right foot. Sitting up in bed, call light in reach.

## 2023-12-29 NOTE — CONSULTS
12/29/23 1318   Spiritual Care   Spiritual Care Visit Type follow-up   Spiritual Care Source  initiative   Receptivity to Spiritual Care visit welcomed   Spiritual Care Interventions supportive conversation provided   Response to Spiritual Care receptive of support;engaged in conversation   Use of Spiritual Resources spirituality for coping, indicated strong use of   Spiritual Care Follow-Up will follow closely   Spiritual Care Request coping/stress of illness support;spiritual/moral support

## 2023-12-29 NOTE — PLAN OF CARE
Goal Outcome Evaluation:  Plan of Care Reviewed With: patient        Progress: improving  Outcome Evaluation: Alert and oriented x4; able to express needs to staff. Baclofen administered at 0056; Percocet administered at 2105 and 0445 for left hip pain. Resident states he has more pain at night because he sleeps a lot during the day. Utilized urinal and bedpan with one episode of incontinence tonight. Turned to left on occasion; see charting. Call light within reach; care plan ongoing.

## 2023-12-30 LAB
GLUCOSE BLDC GLUCOMTR-MCNC: 104 MG/DL (ref 70–99)
GLUCOSE BLDC GLUCOMTR-MCNC: 136 MG/DL (ref 70–99)
GLUCOSE BLDC GLUCOMTR-MCNC: 155 MG/DL (ref 70–99)
GLUCOSE BLDC GLUCOMTR-MCNC: 83 MG/DL (ref 70–99)

## 2023-12-30 PROCEDURE — 63710000001 INSULIN DETEMIR PER 5 UNITS: Performed by: PHYSICIAN ASSISTANT

## 2023-12-30 PROCEDURE — 82948 REAGENT STRIP/BLOOD GLUCOSE: CPT

## 2023-12-30 PROCEDURE — 63710000001 INSULIN LISPRO (HUMAN) PER 5 UNITS: Performed by: INTERNAL MEDICINE

## 2023-12-30 RX ADMIN — OXYCODONE AND ACETAMINOPHEN 1 TABLET: 7.5; 325 TABLET ORAL at 08:14

## 2023-12-30 RX ADMIN — Medication 250 MG: at 09:01

## 2023-12-30 RX ADMIN — BACLOFEN 10 MG: 10 TABLET ORAL at 01:18

## 2023-12-30 RX ADMIN — OXYCODONE AND ACETAMINOPHEN 1 TABLET: 7.5; 325 TABLET ORAL at 02:24

## 2023-12-30 RX ADMIN — PREGABALIN 100 MG: 100 CAPSULE ORAL at 09:01

## 2023-12-30 RX ADMIN — PREGABALIN 100 MG: 100 CAPSULE ORAL at 21:11

## 2023-12-30 RX ADMIN — PREGABALIN 100 MG: 100 CAPSULE ORAL at 16:30

## 2023-12-30 RX ADMIN — Medication 10 MG: at 21:12

## 2023-12-30 RX ADMIN — SERTRALINE HYDROCHLORIDE 25 MG: 25 TABLET ORAL at 21:11

## 2023-12-30 RX ADMIN — OXYCODONE AND ACETAMINOPHEN 1 TABLET: 7.5; 325 TABLET ORAL at 23:53

## 2023-12-30 RX ADMIN — INSULIN DETEMIR 70 UNITS: 100 INJECTION, SOLUTION SUBCUTANEOUS at 09:01

## 2023-12-30 RX ADMIN — CYANOCOBALAMIN TAB 500 MCG 1000 MCG: 500 TAB at 09:01

## 2023-12-30 RX ADMIN — OXYCODONE AND ACETAMINOPHEN 1 TABLET: 7.5; 325 TABLET ORAL at 16:02

## 2023-12-30 RX ADMIN — EMPAGLIFLOZIN 10 MG: 10 TABLET, FILM COATED ORAL at 09:01

## 2023-12-30 RX ADMIN — MICONAZOLE NITRATE: 2 POWDER TOPICAL at 09:02

## 2023-12-30 RX ADMIN — INSULIN LISPRO 4 UNITS: 100 INJECTION, SOLUTION INTRAVENOUS; SUBCUTANEOUS at 21:12

## 2023-12-30 RX ADMIN — BACLOFEN 10 MG: 10 TABLET ORAL at 14:10

## 2023-12-30 RX ADMIN — MICONAZOLE NITRATE: 2 POWDER TOPICAL at 21:13

## 2023-12-30 RX ADMIN — APIXABAN 5 MG: 5 TABLET, FILM COATED ORAL at 09:01

## 2023-12-30 RX ADMIN — FERROUS SULFATE TAB 325 MG (65 MG ELEMENTAL FE) 325 MG: 325 (65 FE) TAB at 08:14

## 2023-12-30 RX ADMIN — Medication 250 MG: at 21:11

## 2023-12-30 RX ADMIN — ATORVASTATIN CALCIUM 10 MG: 10 TABLET, FILM COATED ORAL at 21:11

## 2023-12-30 RX ADMIN — LISINOPRIL 2.5 MG: 2.5 TABLET ORAL at 09:01

## 2023-12-30 RX ADMIN — APIXABAN 5 MG: 5 TABLET, FILM COATED ORAL at 21:11

## 2023-12-30 RX ADMIN — INSULIN DETEMIR 60 UNITS: 100 INJECTION, SOLUTION SUBCUTANEOUS at 21:12

## 2023-12-30 NOTE — PLAN OF CARE
Goal Outcome Evaluation:   Alert and oriented and pleasant with staff this shift. X2 Max assist for transfers and ambulation. X3 admin PRN pain medication this shift, with relief of symptoms noted. No other significant changes noted this shift. Sitting up in bed, call light in reach.

## 2023-12-30 NOTE — PLAN OF CARE
Goal Outcome Evaluation:  Plan of Care Reviewed With: patient        Progress: no change  Outcome Evaluation: Alert and oriented. Vital signs stable. Percocet administered X 2 for left hip and baclofen administered X 1. Refused turns. Able to communicate needs. Continue plan of care.

## 2023-12-31 LAB
GLUCOSE BLDC GLUCOMTR-MCNC: 136 MG/DL (ref 70–99)
GLUCOSE BLDC GLUCOMTR-MCNC: 140 MG/DL (ref 70–99)
GLUCOSE BLDC GLUCOMTR-MCNC: 170 MG/DL (ref 70–99)
GLUCOSE BLDC GLUCOMTR-MCNC: 204 MG/DL (ref 70–99)
GLUCOSE BLDC GLUCOMTR-MCNC: 91 MG/DL (ref 70–99)

## 2023-12-31 PROCEDURE — 63710000001 INSULIN DETEMIR PER 5 UNITS: Performed by: PHYSICIAN ASSISTANT

## 2023-12-31 PROCEDURE — 63710000001 INSULIN LISPRO (HUMAN) PER 5 UNITS: Performed by: INTERNAL MEDICINE

## 2023-12-31 PROCEDURE — 82948 REAGENT STRIP/BLOOD GLUCOSE: CPT

## 2023-12-31 RX ADMIN — PREGABALIN 100 MG: 100 CAPSULE ORAL at 08:45

## 2023-12-31 RX ADMIN — INSULIN LISPRO 4 UNITS: 100 INJECTION, SOLUTION INTRAVENOUS; SUBCUTANEOUS at 17:42

## 2023-12-31 RX ADMIN — OXYCODONE AND ACETAMINOPHEN 1 TABLET: 7.5; 325 TABLET ORAL at 07:11

## 2023-12-31 RX ADMIN — BACLOFEN 10 MG: 10 TABLET ORAL at 11:29

## 2023-12-31 RX ADMIN — PREGABALIN 100 MG: 100 CAPSULE ORAL at 16:31

## 2023-12-31 RX ADMIN — INSULIN DETEMIR 70 UNITS: 100 INJECTION, SOLUTION SUBCUTANEOUS at 08:44

## 2023-12-31 RX ADMIN — LISINOPRIL 2.5 MG: 2.5 TABLET ORAL at 08:45

## 2023-12-31 RX ADMIN — BACLOFEN 10 MG: 10 TABLET ORAL at 23:14

## 2023-12-31 RX ADMIN — PREGABALIN 100 MG: 100 CAPSULE ORAL at 20:19

## 2023-12-31 RX ADMIN — APIXABAN 5 MG: 5 TABLET, FILM COATED ORAL at 20:19

## 2023-12-31 RX ADMIN — FERROUS SULFATE TAB 325 MG (65 MG ELEMENTAL FE) 325 MG: 325 (65 FE) TAB at 08:45

## 2023-12-31 RX ADMIN — Medication 250 MG: at 08:45

## 2023-12-31 RX ADMIN — EMPAGLIFLOZIN 10 MG: 10 TABLET, FILM COATED ORAL at 08:45

## 2023-12-31 RX ADMIN — BACLOFEN 10 MG: 10 TABLET ORAL at 02:54

## 2023-12-31 RX ADMIN — ATORVASTATIN CALCIUM 10 MG: 10 TABLET, FILM COATED ORAL at 20:19

## 2023-12-31 RX ADMIN — SERTRALINE HYDROCHLORIDE 25 MG: 25 TABLET ORAL at 20:19

## 2023-12-31 RX ADMIN — Medication 10 MG: at 20:19

## 2023-12-31 RX ADMIN — CYANOCOBALAMIN TAB 500 MCG 1000 MCG: 500 TAB at 08:45

## 2023-12-31 RX ADMIN — MICONAZOLE NITRATE: 2 POWDER TOPICAL at 20:20

## 2023-12-31 RX ADMIN — Medication 250 MG: at 20:19

## 2023-12-31 RX ADMIN — MICONAZOLE NITRATE: 2 POWDER TOPICAL at 08:45

## 2023-12-31 RX ADMIN — OXYCODONE AND ACETAMINOPHEN 1 TABLET: 7.5; 325 TABLET ORAL at 20:20

## 2023-12-31 RX ADMIN — INSULIN LISPRO 8 UNITS: 100 INJECTION, SOLUTION INTRAVENOUS; SUBCUTANEOUS at 08:44

## 2023-12-31 RX ADMIN — INSULIN DETEMIR 60 UNITS: 100 INJECTION, SOLUTION SUBCUTANEOUS at 20:19

## 2023-12-31 RX ADMIN — APIXABAN 5 MG: 5 TABLET, FILM COATED ORAL at 08:45

## 2023-12-31 NOTE — PLAN OF CARE
Goal Outcome Evaluation:  Plan of Care Reviewed With: patient           Outcome Evaluation: No significant changes noted. Pt is AO x 4 and VSS. He has taken pain and muscle relaxer x 1 this shift. Refuses turns. Uses his urinal and a bedpan when needed. Will continue with his plan of care. Call light and personal items within reach.

## 2023-12-31 NOTE — PLAN OF CARE
Goal Outcome Evaluation:  Plan of Care Reviewed With: patient        Progress: improving  Outcome Evaluation: resident alert, oriented, able to make needs known to staff. remains in bed this shift. has been incont of bowel several x's this shift, also had con't bm using bedpan. skin around anus remains excoriated, reddend and continues with skin changes to gluteal crease at thigh, appears to be crusted skin. has mild traces of blood to wipes when getting to the final wipe after incontinence. thick layers of skin barrier applied each time. Medicated for prn pain x1, effective. Medicated with prn baclofen per request, effective. Resident door dashed pizza this afternoon. still consumed 100% at dinner time. blood glucose elevated for BF and dinner, covered wih sliding scale. Resident pleasant and cooperative.

## 2024-01-01 LAB
GLUCOSE BLDC GLUCOMTR-MCNC: 100 MG/DL (ref 70–99)
GLUCOSE BLDC GLUCOMTR-MCNC: 125 MG/DL (ref 70–99)
GLUCOSE BLDC GLUCOMTR-MCNC: 131 MG/DL (ref 70–99)
GLUCOSE BLDC GLUCOMTR-MCNC: 175 MG/DL (ref 70–99)
SARS-COV-2 RNA RESP QL NAA+PROBE: NOT DETECTED

## 2024-01-01 PROCEDURE — 63710000001 INSULIN DETEMIR PER 5 UNITS: Performed by: PHYSICIAN ASSISTANT

## 2024-01-01 PROCEDURE — 99308 SBSQ NF CARE LOW MDM 20: CPT | Performed by: PHYSICIAN ASSISTANT

## 2024-01-01 PROCEDURE — 87635 SARS-COV-2 COVID-19 AMP PRB: CPT | Performed by: FAMILY MEDICINE

## 2024-01-01 PROCEDURE — 82948 REAGENT STRIP/BLOOD GLUCOSE: CPT

## 2024-01-01 PROCEDURE — 63710000001 INSULIN LISPRO (HUMAN) PER 5 UNITS: Performed by: INTERNAL MEDICINE

## 2024-01-01 RX ADMIN — PREGABALIN 100 MG: 100 CAPSULE ORAL at 16:40

## 2024-01-01 RX ADMIN — MICONAZOLE NITRATE: 2 POWDER TOPICAL at 21:12

## 2024-01-01 RX ADMIN — FERROUS SULFATE TAB 325 MG (65 MG ELEMENTAL FE) 325 MG: 325 (65 FE) TAB at 08:40

## 2024-01-01 RX ADMIN — LISINOPRIL 2.5 MG: 2.5 TABLET ORAL at 08:40

## 2024-01-01 RX ADMIN — PREGABALIN 100 MG: 100 CAPSULE ORAL at 21:10

## 2024-01-01 RX ADMIN — Medication 250 MG: at 08:40

## 2024-01-01 RX ADMIN — INSULIN LISPRO 4 UNITS: 100 INJECTION, SOLUTION INTRAVENOUS; SUBCUTANEOUS at 08:39

## 2024-01-01 RX ADMIN — APIXABAN 5 MG: 5 TABLET, FILM COATED ORAL at 08:40

## 2024-01-01 RX ADMIN — APIXABAN 5 MG: 5 TABLET, FILM COATED ORAL at 21:10

## 2024-01-01 RX ADMIN — INSULIN DETEMIR 60 UNITS: 100 INJECTION, SOLUTION SUBCUTANEOUS at 21:09

## 2024-01-01 RX ADMIN — PREGABALIN 100 MG: 100 CAPSULE ORAL at 08:40

## 2024-01-01 RX ADMIN — SERTRALINE HYDROCHLORIDE 25 MG: 25 TABLET ORAL at 21:09

## 2024-01-01 RX ADMIN — INSULIN DETEMIR 70 UNITS: 100 INJECTION, SOLUTION SUBCUTANEOUS at 08:39

## 2024-01-01 RX ADMIN — OXYCODONE AND ACETAMINOPHEN 1 TABLET: 7.5; 325 TABLET ORAL at 21:10

## 2024-01-01 RX ADMIN — OXYCODONE AND ACETAMINOPHEN 1 TABLET: 7.5; 325 TABLET ORAL at 12:20

## 2024-01-01 RX ADMIN — Medication 10 MG: at 21:09

## 2024-01-01 RX ADMIN — EMPAGLIFLOZIN 10 MG: 10 TABLET, FILM COATED ORAL at 08:40

## 2024-01-01 RX ADMIN — CYANOCOBALAMIN TAB 500 MCG 1000 MCG: 500 TAB at 08:40

## 2024-01-01 RX ADMIN — BACLOFEN 10 MG: 10 TABLET ORAL at 08:40

## 2024-01-01 RX ADMIN — OXYCODONE AND ACETAMINOPHEN 1 TABLET: 7.5; 325 TABLET ORAL at 06:27

## 2024-01-01 RX ADMIN — ATORVASTATIN CALCIUM 10 MG: 10 TABLET, FILM COATED ORAL at 21:10

## 2024-01-01 RX ADMIN — Medication 250 MG: at 21:10

## 2024-01-01 RX ADMIN — MICONAZOLE NITRATE: 2 POWDER TOPICAL at 08:40

## 2024-01-01 NOTE — PROGRESS NOTES
Patient tolerated HBO treatment well and there were no complications.  I was present throughout the duration of the treatment for direct supervision.         Hep B, adolescent or pediatric; 2024 06:00; Lydia Perkins (RN); [employer] [id number] (Exp. Date: 21-May-2026); IntraMuscular; Vastus Lateralis Left.; 0.5 milliLiter(s); VIS (VIS Published: 12-May-2023, VIS Presented: 2024);

## 2024-01-01 NOTE — PLAN OF CARE
Goal Outcome Evaluation:  Plan of Care Reviewed With: patient        Progress: improving  Outcome Evaluation: Resident alert, oriented, able to make needs known to staff. remains in bed all shift. continues with episodes of incontinence of bowel as well as continent. blood glucose elevated for breakfast, covered with sliding scale. Medicated for prn pain x1, effective. medicated with prn Baclofen per request x1, effective. Resident participated with staff exercises in bed, tolerated well. door dashed this afternoon. Resident pleasant and cooperative.

## 2024-01-01 NOTE — PROGRESS NOTES
[facility]   Hospitalist Progress Note  Date: 2024  Patient Name: Jose Shaikh  : 1960  MRN: [id number]  Date of admission: 2023      Subjective   Subjective     Chief Complaint: Weakness    Summary: Jose Shaikh is a 63 y.o. male  initially hospitalized on 9/10/2023, prolonged hospitalization for treatment of management of generalized weakness, deconditioning, with acute issues of diarrhea, C. difficile negative.  Diarrhea improved.  Had small hematoma after ambulating on his foot.  Unfortunately with exhaustive efforts to try to place this gentleman after 39 days of hospitalization, we are unable to find a facility that will accept him.  He has no further acute needs or requirements for inpatient monitoring and management, his labs and vitals are stable, he is tolerating oral intake, and has been refusing turns and repositionings and other interventions with nursing staff despite recommendations.  Patient discharged in hemodynamically stable condition on 10/19/2023 to follow-up with PCP within 1 week. Unfortunately we cannot solve all of his social issues during this hospitalization due to lack of resources and participation from the patient's perspective despite all our efforts.  Patient has a financial means of making arrangements at home.  Patient appealed his discharge.  Lost his appeal.  LCD: 10/20/23, PFL beginning: 10/21/23 @ 12pm.  Due to an inability to place the patient in a.m. nursing home he has been placed in our skilled nursing facility until arrangements can be made or until he is able to care for himself.      Interval Followup: 2024    Awake alert.  Watching TV.  Denies any new issues.    Glucose reasonably controlled   Hemoglobin stable   Tolerating current medical regimen   No new pains.  No complaints of shortness of breath.    Denies diarrhea.    Vital signs stable        Review of systems: All systems reviewed negative septa following: Patient complains of  generalized weakness fatigue and diarrhea  Objective   Objective     Vitals:   Temp:  [97.5 °F (36.4 °C)-97.7 °F (36.5 °C)] 97.5 °F (36.4 °C)  Heart Rate:  [71-84] 71  Resp:  [18-20] 18  BP: (127-129)/(58) 127/58    Physical Exam   Constitutional: Awake alert oriented no acute distress  Respiratory: No wheeze  GI: Abdomen: Obese.   CV: RRR.  No murmur.  No edema.  Chronic venous stasis changes lower ext.  Neuro/psych:  Calm mood.  No dysarthria.    Result Review    Result Review:  I have personally reviewed the results from the time of this admission to 1/1/2024 15:48 EST and agree with these findings:  []  Laboratory  []  Microbiology  []  Radiology  []  EKG/Telemetry   []  Cardiology/Vascular   []  Pathology  []  Old records  []  Other:    Assessment & Plan   Assessment / Plan   Assessment:  Hospital-acquired weakness  C. difficile diarrhea  Generalized weakness  Deconditioning  DM (Kami Garcia and PCP)  HTN  PAF (on Eliquis; previously seen Dr. Duval)  Morbid obesity with BMI 44  Debility with wheelchair dependence  Hx of severe left hip pain  Severe degenerative joint disease of lower extremities  Hx L calcaneus osteomyelitis  Hx R transmetatarsal resection.  Chronic bilateral foot wounds with right transmetatarsal amputation, healing by secondary intention (wound care clinic; podiatrist Gordon)  Thrombocytopenia, resolved      Plan:    Glucose range: 83  -204.  Continue Levemir 70 a.m., 60 p.m. continue Jardiance 10 mg daily.  Continue sliding scale insulin protocol.  Continue ACE inhibitor.    Continue Eliquis for stroke prophylaxis.  Most recent hemoglobin stable at 11.5.  Platelets stable 80K range.  Monitor periodically.    Monitor hemodynamics periodically.      CBC and BMP periodically .... Ordered for Friday, 1/5/24        Discussed with RN    DVT prophylaxis:  Medical DVT prophylaxis orders are present.    CODE STATUS:   Code Status (Patient has no pulse and is not breathing): CPR (Attempt to  Resuscitate)  Medical Interventions (Patient has pulse or is breathing): Full Support

## 2024-01-01 NOTE — PLAN OF CARE
Goal Outcome Evaluation:  Plan of Care Reviewed With: patient        Progress: improving  Outcome Evaluation: Alert and oriented x4; able to make needs known. Administered Baclofen and Percocet x1 for pain with relief. Blood sugar 136 at bedtime; ate 100% of snack. BM x2; continues to have some bleeding around rectum with cleaning. Has been awake all of shift; call light within reach. Care plan ongoing.

## 2024-01-02 LAB
GLUCOSE BLDC GLUCOMTR-MCNC: 110 MG/DL (ref 70–99)
GLUCOSE BLDC GLUCOMTR-MCNC: 124 MG/DL (ref 70–99)
GLUCOSE BLDC GLUCOMTR-MCNC: 128 MG/DL (ref 70–99)
GLUCOSE BLDC GLUCOMTR-MCNC: 199 MG/DL (ref 70–99)
GLUCOSE BLDC GLUCOMTR-MCNC: 79 MG/DL (ref 70–99)

## 2024-01-02 PROCEDURE — 63710000001 INSULIN LISPRO (HUMAN) PER 5 UNITS: Performed by: INTERNAL MEDICINE

## 2024-01-02 PROCEDURE — 82948 REAGENT STRIP/BLOOD GLUCOSE: CPT

## 2024-01-02 PROCEDURE — 63710000001 INSULIN DETEMIR PER 5 UNITS: Performed by: PHYSICIAN ASSISTANT

## 2024-01-02 RX ADMIN — PREGABALIN 100 MG: 100 CAPSULE ORAL at 21:23

## 2024-01-02 RX ADMIN — Medication 250 MG: at 21:22

## 2024-01-02 RX ADMIN — ATORVASTATIN CALCIUM 10 MG: 10 TABLET, FILM COATED ORAL at 21:22

## 2024-01-02 RX ADMIN — INSULIN LISPRO 4 UNITS: 100 INJECTION, SOLUTION INTRAVENOUS; SUBCUTANEOUS at 21:23

## 2024-01-02 RX ADMIN — Medication 10 MG: at 21:22

## 2024-01-02 RX ADMIN — BACLOFEN 10 MG: 10 TABLET ORAL at 00:17

## 2024-01-02 RX ADMIN — FERROUS SULFATE TAB 325 MG (65 MG ELEMENTAL FE) 325 MG: 325 (65 FE) TAB at 08:39

## 2024-01-02 RX ADMIN — PREGABALIN 100 MG: 100 CAPSULE ORAL at 16:45

## 2024-01-02 RX ADMIN — PREGABALIN 100 MG: 100 CAPSULE ORAL at 08:39

## 2024-01-02 RX ADMIN — MICONAZOLE NITRATE: 2 POWDER TOPICAL at 21:24

## 2024-01-02 RX ADMIN — APIXABAN 5 MG: 5 TABLET, FILM COATED ORAL at 08:40

## 2024-01-02 RX ADMIN — OXYCODONE AND ACETAMINOPHEN 1 TABLET: 7.5; 325 TABLET ORAL at 21:21

## 2024-01-02 RX ADMIN — APIXABAN 5 MG: 5 TABLET, FILM COATED ORAL at 21:23

## 2024-01-02 RX ADMIN — INSULIN DETEMIR 60 UNITS: 100 INJECTION, SOLUTION SUBCUTANEOUS at 21:23

## 2024-01-02 RX ADMIN — SERTRALINE HYDROCHLORIDE 25 MG: 25 TABLET ORAL at 21:22

## 2024-01-02 RX ADMIN — EMPAGLIFLOZIN 10 MG: 10 TABLET, FILM COATED ORAL at 08:40

## 2024-01-02 RX ADMIN — INSULIN DETEMIR 70 UNITS: 100 INJECTION, SOLUTION SUBCUTANEOUS at 08:41

## 2024-01-02 RX ADMIN — Medication 250 MG: at 08:40

## 2024-01-02 RX ADMIN — LISINOPRIL 2.5 MG: 2.5 TABLET ORAL at 08:40

## 2024-01-02 RX ADMIN — OXYCODONE AND ACETAMINOPHEN 1 TABLET: 7.5; 325 TABLET ORAL at 10:11

## 2024-01-02 RX ADMIN — CYANOCOBALAMIN TAB 500 MCG 1000 MCG: 500 TAB at 08:41

## 2024-01-02 RX ADMIN — MICONAZOLE NITRATE: 2 POWDER TOPICAL at 08:41

## 2024-01-02 NOTE — CONSULTS
01/02/24 1433   Spiritual Care   Spiritual Care Visit Type follow-up   Spiritual Care Source  initiative   Receptivity to Spiritual Care visit welcomed   Spiritual Care Interventions supportive conversation provided   Response to Spiritual Care receptive of support;engaged in conversation   Use of Spiritual Resources spirituality for coping, indicated strong use of   Spiritual Care Follow-Up will follow closely   Spiritual Care Request coping/stress of illness support;spiritual/moral support;mood/anxiety support

## 2024-01-02 NOTE — PLAN OF CARE
Goal Outcome Evaluation:  Plan of Care Reviewed With: patient        Progress: no change  Outcome Evaluation: Alert and oriented x4; able to make needs known to staff. Baclofen and Percocet administered per MAR as needed for pain. Utilized urinal and bedpan this shift. Call light within reach; care plan ongoing.

## 2024-01-03 LAB
GLUCOSE BLDC GLUCOMTR-MCNC: 157 MG/DL (ref 70–99)
GLUCOSE BLDC GLUCOMTR-MCNC: 163 MG/DL (ref 70–99)
GLUCOSE BLDC GLUCOMTR-MCNC: 195 MG/DL (ref 70–99)
GLUCOSE BLDC GLUCOMTR-MCNC: 70 MG/DL (ref 70–99)

## 2024-01-03 PROCEDURE — 63710000001 INSULIN DETEMIR PER 5 UNITS: Performed by: PHYSICIAN ASSISTANT

## 2024-01-03 PROCEDURE — 63710000001 INSULIN LISPRO (HUMAN) PER 5 UNITS: Performed by: INTERNAL MEDICINE

## 2024-01-03 PROCEDURE — 82948 REAGENT STRIP/BLOOD GLUCOSE: CPT

## 2024-01-03 RX ADMIN — INSULIN LISPRO 4 UNITS: 100 INJECTION, SOLUTION INTRAVENOUS; SUBCUTANEOUS at 08:00

## 2024-01-03 RX ADMIN — PREGABALIN 100 MG: 100 CAPSULE ORAL at 16:57

## 2024-01-03 RX ADMIN — SERTRALINE HYDROCHLORIDE 25 MG: 25 TABLET ORAL at 21:08

## 2024-01-03 RX ADMIN — BACLOFEN 10 MG: 10 TABLET ORAL at 00:46

## 2024-01-03 RX ADMIN — INSULIN LISPRO 4 UNITS: 100 INJECTION, SOLUTION INTRAVENOUS; SUBCUTANEOUS at 21:09

## 2024-01-03 RX ADMIN — MICONAZOLE NITRATE: 2 POWDER TOPICAL at 08:10

## 2024-01-03 RX ADMIN — OXYCODONE AND ACETAMINOPHEN 1 TABLET: 7.5; 325 TABLET ORAL at 05:36

## 2024-01-03 RX ADMIN — BACLOFEN 10 MG: 10 TABLET ORAL at 08:09

## 2024-01-03 RX ADMIN — EMPAGLIFLOZIN 10 MG: 10 TABLET, FILM COATED ORAL at 08:10

## 2024-01-03 RX ADMIN — IBUPROFEN 400 MG: 400 TABLET ORAL at 22:36

## 2024-01-03 RX ADMIN — Medication 250 MG: at 08:10

## 2024-01-03 RX ADMIN — MICONAZOLE NITRATE: 2 POWDER TOPICAL at 21:09

## 2024-01-03 RX ADMIN — APIXABAN 5 MG: 5 TABLET, FILM COATED ORAL at 08:10

## 2024-01-03 RX ADMIN — APIXABAN 5 MG: 5 TABLET, FILM COATED ORAL at 21:07

## 2024-01-03 RX ADMIN — Medication 250 MG: at 21:08

## 2024-01-03 RX ADMIN — INSULIN LISPRO 4 UNITS: 100 INJECTION, SOLUTION INTRAVENOUS; SUBCUTANEOUS at 12:04

## 2024-01-03 RX ADMIN — INSULIN DETEMIR 60 UNITS: 100 INJECTION, SOLUTION SUBCUTANEOUS at 21:08

## 2024-01-03 RX ADMIN — CYANOCOBALAMIN TAB 500 MCG 1000 MCG: 500 TAB at 08:10

## 2024-01-03 RX ADMIN — Medication 10 MG: at 21:07

## 2024-01-03 RX ADMIN — OXYCODONE AND ACETAMINOPHEN 1 TABLET: 7.5; 325 TABLET ORAL at 21:07

## 2024-01-03 RX ADMIN — FERROUS SULFATE TAB 325 MG (65 MG ELEMENTAL FE) 325 MG: 325 (65 FE) TAB at 08:00

## 2024-01-03 RX ADMIN — BACLOFEN 10 MG: 10 TABLET ORAL at 22:36

## 2024-01-03 RX ADMIN — PREGABALIN 100 MG: 100 CAPSULE ORAL at 08:10

## 2024-01-03 RX ADMIN — ATORVASTATIN CALCIUM 10 MG: 10 TABLET, FILM COATED ORAL at 21:08

## 2024-01-03 RX ADMIN — INSULIN DETEMIR 70 UNITS: 100 INJECTION, SOLUTION SUBCUTANEOUS at 08:13

## 2024-01-03 RX ADMIN — PREGABALIN 100 MG: 100 CAPSULE ORAL at 21:06

## 2024-01-03 RX ADMIN — OXYCODONE AND ACETAMINOPHEN 1 TABLET: 7.5; 325 TABLET ORAL at 14:17

## 2024-01-03 RX ADMIN — LISINOPRIL 2.5 MG: 2.5 TABLET ORAL at 08:09

## 2024-01-03 NOTE — PLAN OF CARE
Goal Outcome Evaluation:  Plan of Care Reviewed With: patient        Progress: no change  Outcome Evaluation: Patient is alert and oriented x4. C/O muscle spasms in left hip at 0809 and was given PRN Baclofen. Given PRN Percocet for c/o left hip pain rated 10/10 in left hip at 1417. Pain reported as radiating to lower back and down left leg. Med effective. Voices needs.

## 2024-01-03 NOTE — PLAN OF CARE
Goal Outcome Evaluation:  Plan of Care Reviewed With: patient           Outcome Evaluation: Pt AO x 4 and VSS. No significant changes noted. He uses his urinal or bedpan, day or night. He was medicated with Percocet and Baclofen this shift. Will continue with his plan of care. Call light and personal items within reach.

## 2024-01-03 NOTE — PLAN OF CARE
Goal Outcome Evaluation: POC continues at this time. Patient has had episodes of urinary and stool incontinence this shift, patient also uses bedpan and urinal. See eMAR for medication administration details.  Call light in reach. Patient is able to make needs known.

## 2024-01-04 LAB
GLUCOSE BLDC GLUCOMTR-MCNC: 127 MG/DL (ref 70–99)
GLUCOSE BLDC GLUCOMTR-MCNC: 150 MG/DL (ref 70–99)
GLUCOSE BLDC GLUCOMTR-MCNC: 175 MG/DL (ref 70–99)
GLUCOSE BLDC GLUCOMTR-MCNC: 67 MG/DL (ref 70–99)

## 2024-01-04 PROCEDURE — 63710000001 INSULIN DETEMIR PER 5 UNITS: Performed by: PHYSICIAN ASSISTANT

## 2024-01-04 PROCEDURE — 82948 REAGENT STRIP/BLOOD GLUCOSE: CPT

## 2024-01-04 PROCEDURE — 63710000001 INSULIN LISPRO (HUMAN) PER 5 UNITS: Performed by: INTERNAL MEDICINE

## 2024-01-04 RX ADMIN — OXYCODONE AND ACETAMINOPHEN 1 TABLET: 7.5; 325 TABLET ORAL at 05:40

## 2024-01-04 RX ADMIN — CYANOCOBALAMIN TAB 500 MCG 1000 MCG: 500 TAB at 09:14

## 2024-01-04 RX ADMIN — BACLOFEN 10 MG: 10 TABLET ORAL at 16:31

## 2024-01-04 RX ADMIN — SERTRALINE HYDROCHLORIDE 25 MG: 25 TABLET ORAL at 21:53

## 2024-01-04 RX ADMIN — ATORVASTATIN CALCIUM 10 MG: 10 TABLET, FILM COATED ORAL at 21:47

## 2024-01-04 RX ADMIN — Medication 250 MG: at 09:14

## 2024-01-04 RX ADMIN — PREGABALIN 100 MG: 100 CAPSULE ORAL at 21:47

## 2024-01-04 RX ADMIN — Medication 250 MG: at 21:47

## 2024-01-04 RX ADMIN — PREGABALIN 100 MG: 100 CAPSULE ORAL at 16:31

## 2024-01-04 RX ADMIN — APIXABAN 5 MG: 5 TABLET, FILM COATED ORAL at 09:13

## 2024-01-04 RX ADMIN — INSULIN DETEMIR 60 UNITS: 100 INJECTION, SOLUTION SUBCUTANEOUS at 21:47

## 2024-01-04 RX ADMIN — MICONAZOLE NITRATE: 2 POWDER TOPICAL at 09:15

## 2024-01-04 RX ADMIN — APIXABAN 5 MG: 5 TABLET, FILM COATED ORAL at 21:46

## 2024-01-04 RX ADMIN — PREGABALIN 100 MG: 100 CAPSULE ORAL at 09:14

## 2024-01-04 RX ADMIN — Medication 10 MG: at 21:47

## 2024-01-04 RX ADMIN — FERROUS SULFATE TAB 325 MG (65 MG ELEMENTAL FE) 325 MG: 325 (65 FE) TAB at 09:14

## 2024-01-04 RX ADMIN — OXYCODONE AND ACETAMINOPHEN 1 TABLET: 7.5; 325 TABLET ORAL at 12:22

## 2024-01-04 RX ADMIN — INSULIN DETEMIR 70 UNITS: 100 INJECTION, SOLUTION SUBCUTANEOUS at 09:14

## 2024-01-04 RX ADMIN — INSULIN LISPRO 4 UNITS: 100 INJECTION, SOLUTION INTRAVENOUS; SUBCUTANEOUS at 12:23

## 2024-01-04 RX ADMIN — IBUPROFEN 400 MG: 400 TABLET ORAL at 19:55

## 2024-01-04 RX ADMIN — MICONAZOLE NITRATE: 2 POWDER TOPICAL at 21:48

## 2024-01-04 RX ADMIN — OXYCODONE AND ACETAMINOPHEN 1 TABLET: 7.5; 325 TABLET ORAL at 21:53

## 2024-01-04 RX ADMIN — LISINOPRIL 2.5 MG: 2.5 TABLET ORAL at 09:14

## 2024-01-04 RX ADMIN — EMPAGLIFLOZIN 10 MG: 10 TABLET, FILM COATED ORAL at 09:14

## 2024-01-05 LAB
ALBUMIN SERPL-MCNC: 3 G/DL (ref 3.5–5.2)
ANION GAP SERPL CALCULATED.3IONS-SCNC: 8.1 MMOL/L (ref 5–15)
BASOPHILS # BLD AUTO: 0.04 10*3/MM3 (ref 0–0.2)
BASOPHILS NFR BLD AUTO: 1 % (ref 0–1.5)
BUN SERPL-MCNC: 18 MG/DL (ref 8–23)
BUN/CREAT SERPL: 33.3 (ref 7–25)
CALCIUM SPEC-SCNC: 8.4 MG/DL (ref 8.6–10.5)
CHLORIDE SERPL-SCNC: 107 MMOL/L (ref 98–107)
CO2 SERPL-SCNC: 24.9 MMOL/L (ref 22–29)
CREAT SERPL-MCNC: 0.54 MG/DL (ref 0.76–1.27)
DEPRECATED RDW RBC AUTO: 67.9 FL (ref 37–54)
EGFRCR SERPLBLD CKD-EPI 2021: 110.6 ML/MIN/1.73
ELLIPTOCYTES BLD QL SMEAR: NORMAL
EOSINOPHIL # BLD AUTO: 0.12 10*3/MM3 (ref 0–0.4)
EOSINOPHIL NFR BLD AUTO: 3 % (ref 0.3–6.2)
ERYTHROCYTE [DISTWIDTH] IN BLOOD BY AUTOMATED COUNT: 23.1 % (ref 12.3–15.4)
GLUCOSE BLDC GLUCOMTR-MCNC: 129 MG/DL (ref 70–99)
GLUCOSE BLDC GLUCOMTR-MCNC: 160 MG/DL (ref 70–99)
GLUCOSE BLDC GLUCOMTR-MCNC: 204 MG/DL (ref 70–99)
GLUCOSE BLDC GLUCOMTR-MCNC: 212 MG/DL (ref 70–99)
GLUCOSE SERPL-MCNC: 106 MG/DL (ref 65–99)
HCT VFR BLD AUTO: 39.2 % (ref 37.5–51)
HGB BLD-MCNC: 12 G/DL (ref 13–17.7)
IMM GRANULOCYTES # BLD AUTO: 0.01 10*3/MM3 (ref 0–0.05)
IMM GRANULOCYTES NFR BLD AUTO: 0.3 % (ref 0–0.5)
LYMPHOCYTES # BLD AUTO: 1.04 10*3/MM3 (ref 0.7–3.1)
LYMPHOCYTES NFR BLD AUTO: 26.4 % (ref 19.6–45.3)
MCH RBC QN AUTO: 25.1 PG (ref 26.6–33)
MCHC RBC AUTO-ENTMCNC: 30.6 G/DL (ref 31.5–35.7)
MCV RBC AUTO: 82 FL (ref 79–97)
MONOCYTES # BLD AUTO: 0.5 10*3/MM3 (ref 0.1–0.9)
MONOCYTES NFR BLD AUTO: 12.7 % (ref 5–12)
NEUTROPHILS NFR BLD AUTO: 2.23 10*3/MM3 (ref 1.7–7)
NEUTROPHILS NFR BLD AUTO: 56.6 % (ref 42.7–76)
NRBC BLD AUTO-RTO: 0 /100 WBC (ref 0–0.2)
PHOSPHATE SERPL-MCNC: 3.8 MG/DL (ref 2.5–4.5)
PLATELET # BLD AUTO: 78 10*3/MM3 (ref 140–450)
PMV BLD AUTO: ABNORMAL FL
POTASSIUM SERPL-SCNC: 4.1 MMOL/L (ref 3.5–5.2)
RBC # BLD AUTO: 4.78 10*6/MM3 (ref 4.14–5.8)
SMALL PLATELETS BLD QL SMEAR: NORMAL
SODIUM SERPL-SCNC: 140 MMOL/L (ref 136–145)
WBC MORPH BLD: NORMAL
WBC NRBC COR # BLD AUTO: 3.94 10*3/MM3 (ref 3.4–10.8)

## 2024-01-05 PROCEDURE — 85007 BL SMEAR W/DIFF WBC COUNT: CPT | Performed by: PHYSICIAN ASSISTANT

## 2024-01-05 PROCEDURE — 63710000001 INSULIN DETEMIR PER 5 UNITS: Performed by: PHYSICIAN ASSISTANT

## 2024-01-05 PROCEDURE — 63710000001 INSULIN LISPRO (HUMAN) PER 5 UNITS: Performed by: INTERNAL MEDICINE

## 2024-01-05 PROCEDURE — 80069 RENAL FUNCTION PANEL: CPT | Performed by: PHYSICIAN ASSISTANT

## 2024-01-05 PROCEDURE — 82948 REAGENT STRIP/BLOOD GLUCOSE: CPT

## 2024-01-05 PROCEDURE — 85025 COMPLETE CBC W/AUTO DIFF WBC: CPT | Performed by: PHYSICIAN ASSISTANT

## 2024-01-05 RX ORDER — BISMUTH SUBSALICYLATE 262 MG/1
524 TABLET, CHEWABLE ORAL ONCE AS NEEDED
Status: COMPLETED | OUTPATIENT
Start: 2024-01-05 | End: 2024-01-05

## 2024-01-05 RX ADMIN — Medication 10 MG: at 20:11

## 2024-01-05 RX ADMIN — OXYCODONE AND ACETAMINOPHEN 1 TABLET: 7.5; 325 TABLET ORAL at 04:12

## 2024-01-05 RX ADMIN — INSULIN DETEMIR 70 UNITS: 100 INJECTION, SOLUTION SUBCUTANEOUS at 09:22

## 2024-01-05 RX ADMIN — INSULIN LISPRO 4 UNITS: 100 INJECTION, SOLUTION INTRAVENOUS; SUBCUTANEOUS at 08:02

## 2024-01-05 RX ADMIN — ATORVASTATIN CALCIUM 10 MG: 10 TABLET, FILM COATED ORAL at 20:11

## 2024-01-05 RX ADMIN — APIXABAN 5 MG: 5 TABLET, FILM COATED ORAL at 09:23

## 2024-01-05 RX ADMIN — MICONAZOLE NITRATE: 2 POWDER TOPICAL at 09:25

## 2024-01-05 RX ADMIN — LISINOPRIL 2.5 MG: 2.5 TABLET ORAL at 09:23

## 2024-01-05 RX ADMIN — FERROUS SULFATE TAB 325 MG (65 MG ELEMENTAL FE) 325 MG: 325 (65 FE) TAB at 08:02

## 2024-01-05 RX ADMIN — SERTRALINE HYDROCHLORIDE 25 MG: 25 TABLET ORAL at 20:11

## 2024-01-05 RX ADMIN — Medication 250 MG: at 09:23

## 2024-01-05 RX ADMIN — CYANOCOBALAMIN TAB 500 MCG 1000 MCG: 500 TAB at 09:23

## 2024-01-05 RX ADMIN — INSULIN LISPRO 8 UNITS: 100 INJECTION, SOLUTION INTRAVENOUS; SUBCUTANEOUS at 17:44

## 2024-01-05 RX ADMIN — INSULIN LISPRO 8 UNITS: 100 INJECTION, SOLUTION INTRAVENOUS; SUBCUTANEOUS at 20:09

## 2024-01-05 RX ADMIN — BISMUTH SUBSALICYLATE 524 MG: 262 TABLET, CHEWABLE ORAL at 20:11

## 2024-01-05 RX ADMIN — APIXABAN 5 MG: 5 TABLET, FILM COATED ORAL at 20:11

## 2024-01-05 RX ADMIN — INSULIN DETEMIR 60 UNITS: 100 INJECTION, SOLUTION SUBCUTANEOUS at 20:10

## 2024-01-05 RX ADMIN — PREGABALIN 100 MG: 100 CAPSULE ORAL at 20:11

## 2024-01-05 RX ADMIN — IBUPROFEN 400 MG: 400 TABLET ORAL at 21:52

## 2024-01-05 RX ADMIN — EMPAGLIFLOZIN 10 MG: 10 TABLET, FILM COATED ORAL at 09:23

## 2024-01-05 RX ADMIN — OXYCODONE AND ACETAMINOPHEN 1 TABLET: 7.5; 325 TABLET ORAL at 20:11

## 2024-01-05 RX ADMIN — BACLOFEN 10 MG: 10 TABLET ORAL at 16:17

## 2024-01-05 RX ADMIN — IBUPROFEN 400 MG: 400 TABLET ORAL at 05:12

## 2024-01-05 RX ADMIN — Medication 250 MG: at 20:12

## 2024-01-05 RX ADMIN — PREGABALIN 100 MG: 100 CAPSULE ORAL at 09:23

## 2024-01-05 RX ADMIN — BACLOFEN 10 MG: 10 TABLET ORAL at 05:11

## 2024-01-05 RX ADMIN — OXYCODONE AND ACETAMINOPHEN 1 TABLET: 7.5; 325 TABLET ORAL at 14:04

## 2024-01-05 RX ADMIN — PREGABALIN 100 MG: 100 CAPSULE ORAL at 16:17

## 2024-01-05 NOTE — PLAN OF CARE
Goal Outcome Evaluation:   Alert and oriented and pleasant with staff. X2 Max assist for transfers and ambulation. X2 admin PRN pain medication, with relief of symptoms noted this shift. No significant changes noted, except for new orders for wound care to left foot. Currently sitting up in bed, call light in reach.

## 2024-01-05 NOTE — CONSULTS
"Nutrition Services    Patient Name: Jose Shaikh  YOB: 1960  MRN: [id number]  Admission date: 11/6/2023      CLINICAL NUTRITION ASSESSMENT      Reason for Assessment  Follow-up protocol    H&P:    Past Medical History:   Diagnosis Date    Absence of toe of right foot     Acute osteomyelitis of left calcaneus  08/18/2021    Anxiety and depression     Arthritis     Cancer     Claustrophobia     Corns and callus     Diabetic ulcer of left heel associated with type 2 DM 08/18/2021    Diabetic ulcer of left heel associated with type 2 DM 07/06/2021    Diabetic ulcer of right midfoot associated with type 2 DM 08/18/2021    Difficulty walking     Essential hypertension 08/31/2021    Hammertoe     Hyperlipidemia LDL goal <100 08/31/2021    Ingrown toenail     Obesity     Paroxysmal atrial fibrillation 08/31/2021    Polyneuropathy     Pressure ulcer, stage 1     Tinea unguium     Type 2 diabetes mellitus with polyneuropathy         Current Problems:   Active Hospital Problems    Diagnosis     **Debility         Nutrition/Diet History         Narrative     63-year-old male recently hospitalized September 10, 2023 through November 6, 2023.  Admitted to skilled nursing facility related to generalized weakness and deconditioning, and wound care.      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 RD reviewed wound photos, wound improving.     Per nursing documentation, pt with 100% meals, snacks, and supplements. Continue current nutrition intervention.     Anthropometrics        Current Height, Weight Height: 188 cm (74.02\")  Weight: (!) 165 kg (364 lb 6.7 oz)   Current BMI Body mass index is 46.77 kg/m².       Weight Hx  Wt Readings from Last 30 Encounters:   01/05/24 0555 (!) 165 kg (364 lb 6.7 oz)   01/04/24 0500 (!) 165 kg (363 lb 12.1 oz)   01/03/24 0500 (!) 166 kg (365 lb 1.3 oz)   01/02/24 0500 (!) 167 kg (367 lb 4.6 oz)   01/01/24 0500 (!) 166 kg (365 lb 11.9 oz) "   12/31/23 0500 (!) 160 kg (352 lb 4.7 oz)   12/30/23 0500 (!) 167 kg (367 lb 15.2 oz)   12/29/23 0500 (!) 166 kg (366 lb 10 oz)   12/28/23 0500 (!) 165 kg (364 lb 13.8 oz)   12/27/23 0500 (!) 166 kg (367 lb 1.1 oz)   12/26/23 0500 (!) 167 kg (367 lb 4.6 oz)   12/25/23 0500 (!) 165 kg (364 lb 3.2 oz)   12/24/23 0500 (!) 164 kg (362 lb 7 oz)   12/23/23 0500 (!) 163 kg (360 lb 0.2 oz)   12/22/23 0600 (!) 163 kg (358 lb 14.5 oz)   12/21/23 0500 (!) 163 kg (359 lb 12.7 oz)   12/20/23 0500 (!) 162 kg (357 lb 9.4 oz)   12/19/23 0550 (!) 163 kg (359 lb 5.6 oz)   12/18/23 0500 (!) 161 kg (355 lb 13.2 oz)   12/17/23 0500 (!) 160 kg (353 lb 13.4 oz)   12/16/23 1541 (!) 160 kg (353 lb 3.2 oz)   12/16/23 0500 (!) 165 kg (364 lb 13.8 oz)   12/15/23 0500 (!) 165 kg (362 lb 10.5 oz)   12/14/23 0500 (!) 157 kg (345 lb 14.4 oz)   12/13/23 0500 (!) 153 kg (337 lb 4.9 oz)   12/12/23 0500 (!) 155 kg (341 lb 0.8 oz)   12/11/23 1049 (!) 155 kg (341 lb 0.8 oz)   12/11/23 0500 (!) 160 kg (353 lb 13.4 oz)   12/10/23 0532 (!) 162 kg (356 lb 7.7 oz)   12/09/23 0500 (!) 151 kg (332 lb 10.8 oz)   12/08/23 0500 (!) 153 kg (336 lb 13.8 oz)   12/06/23 0500 (!) 154 kg (340 lb 6.2 oz)   12/05/23 0607 (!) 161 kg (354 lb 15.1 oz)   12/04/23 0500 (!) 161 kg (354 lb 4.5 oz)   12/03/23 0400 (!) 161 kg (354 lb 11.5 oz)   11/21/23 1155 (!) 162 kg (357 lb 12.8 oz)   11/09/23 0500 (!) 160 kg (353 lb 9.9 oz)   11/06/23 1422 (!) 158 kg (348 lb 5.2 oz)   11/02/23 1155 (!) 158 kg (348 lb 15.8 oz)   09/11/23 0030 (!) 151 kg (334 lb)   09/10/23 1844 (!) 154 kg (338 lb 10 oz)   08/30/23 1112 (!) 157 kg (347 lb)   08/01/23 0932 (!) 158 kg (347 lb 10.7 oz)   07/31/23 2347 (!) 163 kg (358 lb 7.5 oz)   06/16/23 2333 (!) 155 kg (342 lb 2.5 oz)   06/16/23 1053 (!) 155 kg (342 lb 3.2 oz)   06/15/23 0505 (!) 149 kg (328 lb 14.4 oz)   06/14/23 0455 (!) 149 kg (328 lb 4.8 oz)   06/13/23 1703 (!) 149 kg (328 lb 11.2 oz)   06/13/23 0600 (!) 170 kg (374 lb 12.5 oz)   06/12/23  0420 (!) 160 kg (352 lb 11.8 oz)   06/11/23 0447 (!) 150 kg (331 lb 5.6 oz)   06/10/23 0500 (!) 150 kg (330 lb 14.6 oz)   06/09/23 0536 (!) 156 kg (343 lb 7.6 oz)   06/08/23 1826 (!) 156 kg (344 lb 11.2 oz)   06/08/23 0522 (!) 158 kg (348 lb 8.8 oz)   06/07/23 0548 (!) 155 kg (342 lb 9.5 oz)   06/06/23 0515 (!) 155 kg (341 lb 14.9 oz)   06/05/23 0445 (!) 155 kg (341 lb 9.6 oz)   06/05/23 0348 (!) 158 kg (349 lb 3.3 oz)   06/04/23 0555 (!) 157 kg (346 lb 3.2 oz)   06/03/23 0539 (!) 158 kg (349 lb)   06/02/23 0548 (!) 163 kg (359 lb 3.2 oz)   06/01/23 0600 (!) 164 kg (362 lb)   05/31/23 0507 (!) 164 kg (362 lb 4.8 oz)   05/30/23 0635 (!) 164 kg (362 lb 7 oz)   05/29/23 0500 (!) 167 kg (368 lb 2.7 oz)   05/28/23 0600 (!) 167 kg (367 lb 11.6 oz)   05/27/23 0600 (!) 167 kg (369 lb 0.8 oz)   05/26/23 0529 (!) 167 kg (369 lb 3.2 oz)   05/25/23 0600 (!) 168 kg (370 lb 3.2 oz)   05/24/23 0600 (!) 166 kg (366 lb 9.6 oz)   05/22/23 0529 (!) 169 kg (373 lb 7.4 oz)   05/21/23 0600 (!) 169 kg (371 lb 12.8 oz)   05/20/23 0600 (!) 171 kg (376 lb 5.2 oz)   05/19/23 0300 (!) 168 kg (370 lb 14.4 oz)   05/18/23 1912 (!) 168 kg (371 lb 7.6 oz)   05/18/23 0600 (!) 169 kg (371 lb 11.1 oz)   05/16/23 0700 (!) 171 kg (377 lb 4.8 oz)   05/14/23 0500 (!) 171 kg (377 lb 3.3 oz)   05/12/23 1143 (!) 170 kg (375 lb)   05/06/23 0258 (!) 170 kg (375 lb 8 oz)   04/19/23 0909 (!) 163 kg (359 lb)   04/03/23 1906 (!) 168 kg (370 lb)   03/27/23 0938 (!) 170 kg (373 lb 10.9 oz)   03/17/23 1153 (!) 168 kg (370 lb)   01/27/23 1501 (!) 168 kg (370 lb)   12/22/22 1501 (!) 171 kg (376 lb)   11/08/22 1035 (!) 161 kg (355 lb)   10/01/22 1141 (!) 164 kg (360 lb 10.8 oz)   05/18/22 1311 (!) 155 kg (341 lb)   03/24/22 1432 (!) 155 kg (341 lb)   03/02/22 1412 (!) 155 kg (341 lb)   01/12/22 1317 (!) 155 kg (341 lb)   12/30/21 1431 (!) 155 kg (341 lb)   12/01/21 1144 (!) 155 kg (341 lb 11.4 oz)   12/01/21 0843 (!) 157 kg (346 lb)   11/22/21 0839 (!) 157 kg (346 lb)    11/15/21 1148 (!) 157 kg (346 lb 12.5 oz)   11/09/21 1139 (!) 157 kg (345 lb 7.4 oz)   11/05/21 1130 (!) 159 kg (351 lb 3.1 oz)   11/02/21 1121 (!) 161 kg (356 lb)   10/27/21 1048 (!) 161 kg (356 lb)   10/21/21 1418 (!) 162 kg (356 lb 14.8 oz)            Wt Change Observation +4.4% x 2 month (since SNF admission)     Estimated/Assessed Needs       Energy Requirements Estimated nutrient needs calculated using adjusted body weight 242 pounds / 110 kg.   EST Needs (kcal/day) 25 kcals per kilogram = 2750 aracelis       Protein Requirements    EST Daily Needs (g/day) 1.0 to 1.2 g/kg = 110 to 132 g of protein       Fluid Requirements     Estimated Needs (mL/day) 25 mL/kg = 2750 mL (11 to 12 cups)     Labs/Medications         Pertinent Labs Reviewed.   Results from last 7 days   Lab Units 01/05/24  0501   SODIUM mmol/L 140   POTASSIUM mmol/L 4.1   CHLORIDE mmol/L 107   CO2 mmol/L 24.9   BUN mg/dL 18   CREATININE mg/dL 0.54*   CALCIUM mg/dL 8.4*   GLUCOSE mg/dL 106*     Results from last 7 days   Lab Units 01/05/24  0501   PHOSPHORUS mg/dL 3.8   HEMOGLOBIN g/dL 12.0*   HEMATOCRIT % 39.2     COVID19   Date Value Ref Range Status   01/01/2024 Not Detected Not Detected - Ref. Range Final     Lab Results   Component Value Date    HGBA1C 6.60 (H) 04/19/2023         Pertinent Medications Reviewed.     Current Nutrition Orders & Evaluation of Intake       Oral Nutrition     Current PO Diet Diet: Diabetic Diets, High Protein Diet; Consistent Carbohydrate; Texture: Regular Texture (IDDSI 7); Fluid Consistency: Thin (IDDSI 0)   Supplement Orders Placed This Encounter      Dietary Nutrition Supplements Anatoly; (Mix with diet sprite)       Malnutrition Severity Assessment                Nutrition Diagnosis         Nutrition Dx Problem 1 Increased nutrient needs related to increased nutrient needs due to catabolic disease as evidenced by  Significant wound to right foot       Nutrition Intervention         Anatoly BID     Medical Nutrition  Therapy/Nutrition Education          Learner     Readiness Patient  Education not indicated at this time     Method     Response N/A  N/A     Monitor/Evaluation        Monitor Per protocol, PO intake, Weight, Skin status, POC/GOC     Nutrition Discharge Plan         Pt will require high protein diet to aid in wound healing upon discharge.      Electronically signed by:  Kacey Montoya RD  01/05/24 08:47 EST

## 2024-01-05 NOTE — PLAN OF CARE
Goal Outcome Evaluation:              Outcome Evaluation: No significant changes this shift. Patient is alert and oriented, able to make needs known, and uses call light. See eMAR for medication administration details. Call light in reach.

## 2024-01-05 NOTE — PLAN OF CARE
Goal Outcome Evaluation:PT A&O times 4 PRN pain meds given per request with effective results, no s/s of distress no changes this shift

## 2024-01-05 NOTE — SIGNIFICANT NOTE
Wound Eval / Progress Noted     [facility]     Patient Name: Jose Shaikh  : 1960  MRN: [id number]  Today's Date: 2024                 Admit Date: 2023    Visit Dx:    ICD-10-CM ICD-9-CM   1. Difficulty in walking  R26.2 719.7         Debility        Past Medical History:   Diagnosis Date    Absence of toe of right foot     Acute osteomyelitis of left calcaneus  2021    Anxiety and depression     Arthritis     Cancer     Claustrophobia     Corns and callus     Diabetic ulcer of left heel associated with type 2 DM 2021    Diabetic ulcer of left heel associated with type 2 DM 2021    Diabetic ulcer of right midfoot associated with type 2 DM 2021    Difficulty walking     Essential hypertension 2021    Hammertoe     Hyperlipidemia LDL goal <100 2021    Ingrown toenail     Obesity     Paroxysmal atrial fibrillation 2021    Polyneuropathy     Pressure ulcer, stage 1     Tinea unguium     Type 2 diabetes mellitus with polyneuropathy         Past Surgical History:   Procedure Laterality Date    CYST REMOVAL      center of back; benign    EYE SURGERY      INCISION AND DRAINAGE ABSCESS      back    INCISION AND DRAINAGE LEG Right 12/10/2021    Procedure: INCISION AND DRAINAGE LOWER EXTREMITY;  Surgeon: Ash Leyva DPM;  Location: [facility] MAIN OR;  Service: Podiatry;  Laterality: Right;    OTHER SURGICAL HISTORY      Surgical clips left foot    TOE SURGERY Right     Removal of 5th toe    TRANS METATARSAL AMPUTATION Right 2021    Procedure: AMPUTATION TRANS METATARSAL;  Surgeon: Ash Leyva DPM;  Location: [facility] MAIN OR;  Service: Podiatry;  Laterality: Right;    VASCULAR SURGERY      WRIST SURGERY Left     repair of injury         Physical Assessment:  Wound 23 1240 Right anterior foot (Active)   Wound Image   24 1035   Dressing Appearance intact;dry 24 1035   Closure None 24 1035   Base red;dry;scab 24 1035    Periwound intact;dry;pink 01/05/24 1035   Periwound Temperature warm 01/05/24 1035   Periwound Skin Turgor soft 01/05/24 1035   Edges rolled/closed 01/05/24 1035   Drainage Amount none 01/05/24 1035   Care, Wound cleansed with;sterile normal saline 01/05/24 1035   Dressing Care dressing applied;non-adherent;petroleum-based;gauze;silicone;border dressing 01/05/24 1035   Periwound Care absorptive dressing applied 01/05/24 1035      Wound Check / Follow-up:  Patient seen today for a wound check and dressing change. Patient refused for this nurse to assess buttocks at this time. Educated patient the importance of allowing staff to turn the patient every two hours.    Red moist tissue noted to the left abdominal fold; remainder of the abdominal folds and groin with pink dry and intact tissue. Cleansed all areas with NS. Applied a light dusting of powder to the folds and left open to air. Recommending to discontinue the use of the miconazole powder and implement application of the corn starch powder. No fungal presentation present at this time.    Right anterior foot with dry reddened scab with pink intact tissue to the periwound. Cleansed with NS and applied non-adherent petroleum gauze to the scab. Recommending to continue current wound care.    Impression: skin tear to the anterior right foot, moisture to the abdominal fold    Short term goals: regain skin integrity, skin protection, pressure reduction, daily dressing changes    Karon Bolton RN    1/5/2024    13:13 EST

## 2024-01-05 NOTE — CONSULTS
01/05/24 1014   Spiritual Care   Spiritual Care Visit Type follow-up   Spiritual Care Source  initiative   Receptivity to Spiritual Care visit welcomed   Spiritual Care Interventions supportive conversation provided   Response to Spiritual Care receptive of support   Use of Spiritual Resources spirituality for coping, indicated strong use of   Spiritual Care Follow-Up will follow closely   Spiritual Care Request spiritual/moral support;mood/anxiety support

## 2024-01-06 LAB
GLUCOSE BLDC GLUCOMTR-MCNC: 158 MG/DL (ref 70–99)
GLUCOSE BLDC GLUCOMTR-MCNC: 74 MG/DL (ref 70–99)
GLUCOSE BLDC GLUCOMTR-MCNC: 77 MG/DL (ref 70–99)
GLUCOSE BLDC GLUCOMTR-MCNC: 98 MG/DL (ref 70–99)

## 2024-01-06 PROCEDURE — 63710000001 INSULIN LISPRO (HUMAN) PER 5 UNITS: Performed by: INTERNAL MEDICINE

## 2024-01-06 PROCEDURE — 82948 REAGENT STRIP/BLOOD GLUCOSE: CPT

## 2024-01-06 PROCEDURE — 63710000001 INSULIN DETEMIR PER 5 UNITS: Performed by: PHYSICIAN ASSISTANT

## 2024-01-06 RX ORDER — BISMUTH SUBSALICYLATE 262 MG/1
262 TABLET, CHEWABLE ORAL DAILY PRN
Status: DISCONTINUED | OUTPATIENT
Start: 2024-01-06 | End: 2024-01-27

## 2024-01-06 RX ORDER — CHOLESTYRAMINE 4 G/9G
1 POWDER, FOR SUSPENSION ORAL DAILY
Status: DISCONTINUED | OUTPATIENT
Start: 2024-01-06 | End: 2024-01-06

## 2024-01-06 RX ORDER — SACCHAROMYCES BOULARDII 250 MG
500 CAPSULE ORAL 2 TIMES DAILY
Status: DISCONTINUED | OUTPATIENT
Start: 2024-01-06 | End: 2024-02-02

## 2024-01-06 RX ADMIN — BISMUTH SUBSALICYLATE 262 MG: 262 TABLET, CHEWABLE ORAL at 15:00

## 2024-01-06 RX ADMIN — BACLOFEN 10 MG: 10 TABLET ORAL at 00:20

## 2024-01-06 RX ADMIN — INSULIN DETEMIR 70 UNITS: 100 INJECTION, SOLUTION SUBCUTANEOUS at 08:13

## 2024-01-06 RX ADMIN — INSULIN DETEMIR 60 UNITS: 100 INJECTION, SOLUTION SUBCUTANEOUS at 21:07

## 2024-01-06 RX ADMIN — LISINOPRIL 2.5 MG: 2.5 TABLET ORAL at 08:14

## 2024-01-06 RX ADMIN — IBUPROFEN 400 MG: 400 TABLET ORAL at 11:13

## 2024-01-06 RX ADMIN — FERROUS SULFATE TAB 325 MG (65 MG ELEMENTAL FE) 325 MG: 325 (65 FE) TAB at 08:14

## 2024-01-06 RX ADMIN — Medication 500 MG: at 21:08

## 2024-01-06 RX ADMIN — OXYCODONE AND ACETAMINOPHEN 1 TABLET: 7.5; 325 TABLET ORAL at 17:25

## 2024-01-06 RX ADMIN — PREGABALIN 100 MG: 100 CAPSULE ORAL at 15:00

## 2024-01-06 RX ADMIN — APIXABAN 5 MG: 5 TABLET, FILM COATED ORAL at 21:07

## 2024-01-06 RX ADMIN — BACLOFEN 10 MG: 10 TABLET ORAL at 19:12

## 2024-01-06 RX ADMIN — Medication 10 MG: at 21:08

## 2024-01-06 RX ADMIN — OXYCODONE AND ACETAMINOPHEN 1 TABLET: 7.5; 325 TABLET ORAL at 05:50

## 2024-01-06 RX ADMIN — Medication 250 MG: at 08:14

## 2024-01-06 RX ADMIN — CYANOCOBALAMIN TAB 500 MCG 1000 MCG: 500 TAB at 08:14

## 2024-01-06 RX ADMIN — SERTRALINE HYDROCHLORIDE 25 MG: 25 TABLET ORAL at 21:07

## 2024-01-06 RX ADMIN — APIXABAN 5 MG: 5 TABLET, FILM COATED ORAL at 08:14

## 2024-01-06 RX ADMIN — INSULIN LISPRO 4 UNITS: 100 INJECTION, SOLUTION INTRAVENOUS; SUBCUTANEOUS at 21:07

## 2024-01-06 RX ADMIN — PREGABALIN 100 MG: 100 CAPSULE ORAL at 08:14

## 2024-01-06 RX ADMIN — ATORVASTATIN CALCIUM 10 MG: 10 TABLET, FILM COATED ORAL at 21:07

## 2024-01-06 RX ADMIN — EMPAGLIFLOZIN 10 MG: 10 TABLET, FILM COATED ORAL at 08:14

## 2024-01-06 RX ADMIN — PREGABALIN 100 MG: 100 CAPSULE ORAL at 21:07

## 2024-01-06 NOTE — PLAN OF CARE
Goal Outcome Evaluation:  Plan of Care Reviewed With: patient        Progress: no change  Outcome Evaluation: Alert and oriented x4. C/O left elbow pain at 1113 and was given ibuprofen which was effective. C/O pain in left hip at 1725 and was given PRN Perocet which was effective. C/O frequent BMs and requested to be put on Pepto Bismol to slow bowels down. Provider notified. Questran ordered. Patient refused stating he tried that for a few weeks before  and it was not effective. New order received for Pepto PRN daily. Given at 1500. Med not yet effective. Patient has had over 12 bowel movement in the past 24 hours. Provider aware. Con't current POC.

## 2024-01-06 NOTE — PLAN OF CARE
Goal Outcome Evaluation:  Plan of Care Reviewed With: patient        Progress: no change  Outcome Evaluation: New order for one time dose of pepto bismol for frequent stool and gas. Patient reported medication was effective. Continues to refuse to turns. Percocet, ibuprofen, and baclofen administered for left lower extremity pain.

## 2024-01-07 LAB
GLUCOSE BLDC GLUCOMTR-MCNC: 106 MG/DL (ref 70–99)
GLUCOSE BLDC GLUCOMTR-MCNC: 147 MG/DL (ref 70–99)
GLUCOSE BLDC GLUCOMTR-MCNC: 177 MG/DL (ref 70–99)
GLUCOSE BLDC GLUCOMTR-MCNC: 73 MG/DL (ref 70–99)
GLUCOSE BLDC GLUCOMTR-MCNC: 98 MG/DL (ref 70–99)
GLUCOSE BLDC GLUCOMTR-MCNC: 99 MG/DL (ref 70–99)

## 2024-01-07 PROCEDURE — 63710000001 INSULIN DETEMIR PER 5 UNITS: Performed by: PHYSICIAN ASSISTANT

## 2024-01-07 PROCEDURE — 82948 REAGENT STRIP/BLOOD GLUCOSE: CPT

## 2024-01-07 PROCEDURE — 63710000001 INSULIN LISPRO (HUMAN) PER 5 UNITS: Performed by: INTERNAL MEDICINE

## 2024-01-07 RX ORDER — BISMUTH SUBSALICYLATE 262 MG/1
524 TABLET, CHEWABLE ORAL ONCE AS NEEDED
Status: COMPLETED | OUTPATIENT
Start: 2024-01-07 | End: 2024-01-07

## 2024-01-07 RX ADMIN — CYANOCOBALAMIN TAB 500 MCG 1000 MCG: 500 TAB at 08:32

## 2024-01-07 RX ADMIN — Medication 500 MG: at 08:32

## 2024-01-07 RX ADMIN — APIXABAN 5 MG: 5 TABLET, FILM COATED ORAL at 08:32

## 2024-01-07 RX ADMIN — PREGABALIN 100 MG: 100 CAPSULE ORAL at 16:39

## 2024-01-07 RX ADMIN — BISMUTH SUBSALICYLATE 524 MG: 262 TABLET, CHEWABLE ORAL at 02:58

## 2024-01-07 RX ADMIN — APIXABAN 5 MG: 5 TABLET, FILM COATED ORAL at 20:09

## 2024-01-07 RX ADMIN — OXYCODONE AND ACETAMINOPHEN 1 TABLET: 7.5; 325 TABLET ORAL at 20:09

## 2024-01-07 RX ADMIN — OXYCODONE AND ACETAMINOPHEN 1 TABLET: 7.5; 325 TABLET ORAL at 00:42

## 2024-01-07 RX ADMIN — Medication 500 MG: at 20:08

## 2024-01-07 RX ADMIN — BACLOFEN 10 MG: 10 TABLET ORAL at 05:42

## 2024-01-07 RX ADMIN — BACLOFEN 10 MG: 10 TABLET ORAL at 22:57

## 2024-01-07 RX ADMIN — BISMUTH SUBSALICYLATE 262 MG: 262 TABLET, CHEWABLE ORAL at 20:08

## 2024-01-07 RX ADMIN — INSULIN DETEMIR 70 UNITS: 100 INJECTION, SOLUTION SUBCUTANEOUS at 08:32

## 2024-01-07 RX ADMIN — INSULIN DETEMIR 60 UNITS: 100 INJECTION, SOLUTION SUBCUTANEOUS at 20:10

## 2024-01-07 RX ADMIN — BACLOFEN 10 MG: 10 TABLET ORAL at 14:25

## 2024-01-07 RX ADMIN — FERROUS SULFATE TAB 325 MG (65 MG ELEMENTAL FE) 325 MG: 325 (65 FE) TAB at 08:32

## 2024-01-07 RX ADMIN — PREGABALIN 100 MG: 100 CAPSULE ORAL at 20:09

## 2024-01-07 RX ADMIN — LISINOPRIL 2.5 MG: 2.5 TABLET ORAL at 08:32

## 2024-01-07 RX ADMIN — SERTRALINE HYDROCHLORIDE 25 MG: 25 TABLET ORAL at 20:08

## 2024-01-07 RX ADMIN — IBUPROFEN 400 MG: 400 TABLET ORAL at 07:26

## 2024-01-07 RX ADMIN — OXYCODONE AND ACETAMINOPHEN 1 TABLET: 7.5; 325 TABLET ORAL at 10:08

## 2024-01-07 RX ADMIN — PREGABALIN 100 MG: 100 CAPSULE ORAL at 08:32

## 2024-01-07 RX ADMIN — Medication 10 MG: at 20:09

## 2024-01-07 RX ADMIN — EMPAGLIFLOZIN 10 MG: 10 TABLET, FILM COATED ORAL at 08:31

## 2024-01-07 RX ADMIN — INSULIN LISPRO 4 UNITS: 100 INJECTION, SOLUTION INTRAVENOUS; SUBCUTANEOUS at 20:10

## 2024-01-07 RX ADMIN — ATORVASTATIN CALCIUM 10 MG: 10 TABLET, FILM COATED ORAL at 20:09

## 2024-01-07 RX ADMIN — IBUPROFEN 400 MG: 400 TABLET ORAL at 17:14

## 2024-01-07 NOTE — PLAN OF CARE
Goal Outcome Evaluation:    AAO X4. TOLERATING DIET & ROOM AIR. REFUSES TO INCREASE ACTIVITY &/OR ANY ATTEMPT AT INCREASING ACTIVITY. SEE EMAR FOR PRN & SCHEDULED MED ADM. WOUND CARE PER MD ORDERS.    EXPLAINED TO PT THAT HE IS CHOOSING TO DECONDITION HIMSELF BY LAYING IN BED & REFUSING ACTIVITY. TOLD THIS NURSE NOT TO WORRY ABOUT IT THAT IT IS BETWEEN HIM & HIS COUNSELOR. ASKED HIM WHAT THAT MEANT & HE TOLD THIS NURSE TO LEAVE HIM & TURN OFF THE LIGHTS ON MY WAY OUT. EXPLAINED LIGHTS SHOULD BE LEFT ON DURING THE DAY SO HE DOESN'T GET HIS DAYS & NIGHT MIXED UP. DECLINED FURTHER COMMENT.    STATES LEFT HIP PAIN IS A 10/10 AAT (NOTED PT TO FALL ASLEEP BEFORE THE PAIN MED ADM WAS & WOKE UP A FEW MIN LATER - PRN PAIN MED ADM PER PT REQUEST) & WILL BE UNTIL HE HAS A HIP REPLACEMENT THOUGH HE HAS TO LOSE WEIGHT BEFORE THE SURGEON WILL DO IT. EXPLAINED WE COULD HELP HIM BY EDUCATING & ORDERING A WEIGHT REDUCTION DIET. STATES NO HE DOES NOT WANT THAT BECAUSE HE LIKES HIS TRAYS HERE WITH THE DOUBLE PORTIONS & ORDERING DOOR DASH.     LATER EXPLAINED HE WAS GOING TO GO TO A ASSISTED LIVING SO HE COULD KEEP HIS CAR & DRIVE IT WHEN HE WANTED. STATES HE LIKES HIS APT & THE BILLS ARE ALL LOW &/OR HE IS IN PROGRAMS THAT MAKE THINGS CHEAP. STATES HE DOESN'T WANT TO GO TO A [facility] BECAUSE THEY WILL TAKE ALL HIS MONEY.    DINNER ACCU CHECK WNL. CALLED THIS NURSE BACK IN HIS ROOM TO SAY HIS BS WAS LOW. RE-CHECKED & WAS NOTED TO BE 73. DINNER HERE & GAVE TO PATIENT BUT HE SAID HE WANTED 4 ICE CREAM SANDWICHES. EXPLAINED WE DON'T HAVE THOSE BUT HE DOES HAVE A DINNER TRAY ON HIS BEDSIDE TABLE & HE SHOULD EAT THAT. TOLD THIS NURSE I DID HAVE ICE CREAM SANDWICHES BUT IT DOESN'T MATTER BECAUSE HE WILL FIX IT HIMSELF; TYPING ON HIS PHONE WHILE TALKING TO THIS NURSE. EXPLAINED TO PT HIS BS WAS A NORMAL RANGE.   RE-CHECKED BS AFTER HE % OF HIS TRAY & BLOOD SUGAR WAS OVER 100. PATIENT HOLDING A DOLLAR GENERAL BAG RECEIVED FROM DOOR DASH  THAT FILLED WITH A VARIETY OF CANDY & CHOCOLATE.    WATCHING FOOTBALL. CALL LIGHT IN REACH.

## 2024-01-07 NOTE — PLAN OF CARE
Goal Outcome Evaluation:  Plan of Care Reviewed With: patient        Progress: no change  Outcome Evaluation: No significant change. Vital signs stable. Continues with percocet and baclofen for pain management to left lower extremity. Continues to refuse turns. Repostions self using trapeze.

## 2024-01-08 LAB
GLUCOSE BLDC GLUCOMTR-MCNC: 113 MG/DL (ref 70–99)
GLUCOSE BLDC GLUCOMTR-MCNC: 113 MG/DL (ref 70–99)
GLUCOSE BLDC GLUCOMTR-MCNC: 136 MG/DL (ref 70–99)
GLUCOSE BLDC GLUCOMTR-MCNC: 138 MG/DL (ref 70–99)

## 2024-01-08 PROCEDURE — 82948 REAGENT STRIP/BLOOD GLUCOSE: CPT

## 2024-01-08 PROCEDURE — 63710000001 INSULIN DETEMIR PER 5 UNITS: Performed by: PHYSICIAN ASSISTANT

## 2024-01-08 RX ADMIN — Medication 500 MG: at 08:27

## 2024-01-08 RX ADMIN — OXYCODONE AND ACETAMINOPHEN 1 TABLET: 7.5; 325 TABLET ORAL at 13:30

## 2024-01-08 RX ADMIN — PREGABALIN 100 MG: 100 CAPSULE ORAL at 08:27

## 2024-01-08 RX ADMIN — APIXABAN 5 MG: 5 TABLET, FILM COATED ORAL at 08:27

## 2024-01-08 RX ADMIN — ATORVASTATIN CALCIUM 10 MG: 10 TABLET, FILM COATED ORAL at 20:51

## 2024-01-08 RX ADMIN — INSULIN DETEMIR 60 UNITS: 100 INJECTION, SOLUTION SUBCUTANEOUS at 20:51

## 2024-01-08 RX ADMIN — IBUPROFEN 400 MG: 400 TABLET ORAL at 06:04

## 2024-01-08 RX ADMIN — Medication 500 MG: at 20:50

## 2024-01-08 RX ADMIN — PREGABALIN 100 MG: 100 CAPSULE ORAL at 16:43

## 2024-01-08 RX ADMIN — OXYCODONE AND ACETAMINOPHEN 1 TABLET: 7.5; 325 TABLET ORAL at 23:32

## 2024-01-08 RX ADMIN — LISINOPRIL 2.5 MG: 2.5 TABLET ORAL at 08:27

## 2024-01-08 RX ADMIN — CYANOCOBALAMIN TAB 500 MCG 1000 MCG: 500 TAB at 08:26

## 2024-01-08 RX ADMIN — BACLOFEN 10 MG: 10 TABLET ORAL at 20:51

## 2024-01-08 RX ADMIN — OXYCODONE AND ACETAMINOPHEN 1 TABLET: 7.5; 325 TABLET ORAL at 05:18

## 2024-01-08 RX ADMIN — SERTRALINE HYDROCHLORIDE 25 MG: 25 TABLET ORAL at 20:51

## 2024-01-08 RX ADMIN — FERROUS SULFATE TAB 325 MG (65 MG ELEMENTAL FE) 325 MG: 325 (65 FE) TAB at 08:27

## 2024-01-08 RX ADMIN — IBUPROFEN 400 MG: 400 TABLET ORAL at 22:13

## 2024-01-08 RX ADMIN — PREGABALIN 100 MG: 100 CAPSULE ORAL at 20:51

## 2024-01-08 RX ADMIN — IBUPROFEN 400 MG: 400 TABLET ORAL at 12:07

## 2024-01-08 RX ADMIN — APIXABAN 5 MG: 5 TABLET, FILM COATED ORAL at 20:51

## 2024-01-08 RX ADMIN — EMPAGLIFLOZIN 10 MG: 10 TABLET, FILM COATED ORAL at 08:27

## 2024-01-08 RX ADMIN — Medication 10 MG: at 20:50

## 2024-01-08 RX ADMIN — INSULIN DETEMIR 70 UNITS: 100 INJECTION, SOLUTION SUBCUTANEOUS at 08:28

## 2024-01-08 NOTE — PLAN OF CARE
Goal Outcome Evaluation:  Plan of Care Reviewed With: patient        Progress: no change  Outcome Evaluation: Alert and oriented X 4. Vital signs stable. Noncompliant with diabetic diet. No significant change. Continues with percocet and baclofen for pain management of left hip and leg.

## 2024-01-08 NOTE — PLAN OF CARE
"Goal Outcome Evaluation:  Plan of Care Reviewed With: patient        Progress: no change  Outcome Evaluation: Patient is alert and oriented. Refused repositioning and turns during shift. Education given on the need to reposition and methods to prevent pressure areas. Patient verbalized understanding, but stated \"I don't care. My hip will start hurting if I move\". Given PRN Ibuprofen at 1207 for c/o left should and elbow pain rated 7/10. Med effective. Patient stated pain was gone to those areas after med administration. Patient given PRN Percocet for c/o left hip pain rated 10/10 at 1330. Med effective per patient. Non-verbal signs of pain absent 45 minutes after receiving pain med. Staff will continue to educate patient on preventing skin breakdown. Con't current POC.         "

## 2024-01-09 LAB
GLUCOSE BLDC GLUCOMTR-MCNC: 118 MG/DL (ref 70–99)
GLUCOSE BLDC GLUCOMTR-MCNC: 130 MG/DL (ref 70–99)
GLUCOSE BLDC GLUCOMTR-MCNC: 194 MG/DL (ref 70–99)
GLUCOSE BLDC GLUCOMTR-MCNC: 200 MG/DL (ref 70–99)

## 2024-01-09 PROCEDURE — 63710000001 INSULIN LISPRO (HUMAN) PER 5 UNITS: Performed by: INTERNAL MEDICINE

## 2024-01-09 PROCEDURE — 82948 REAGENT STRIP/BLOOD GLUCOSE: CPT

## 2024-01-09 PROCEDURE — 63710000001 INSULIN DETEMIR PER 5 UNITS: Performed by: PHYSICIAN ASSISTANT

## 2024-01-09 RX ADMIN — PREGABALIN 100 MG: 100 CAPSULE ORAL at 16:21

## 2024-01-09 RX ADMIN — Medication 500 MG: at 20:09

## 2024-01-09 RX ADMIN — FERROUS SULFATE TAB 325 MG (65 MG ELEMENTAL FE) 325 MG: 325 (65 FE) TAB at 08:55

## 2024-01-09 RX ADMIN — ATORVASTATIN CALCIUM 10 MG: 10 TABLET, FILM COATED ORAL at 20:09

## 2024-01-09 RX ADMIN — IBUPROFEN 400 MG: 400 TABLET ORAL at 04:57

## 2024-01-09 RX ADMIN — PREGABALIN 100 MG: 100 CAPSULE ORAL at 08:55

## 2024-01-09 RX ADMIN — Medication 500 MG: at 08:55

## 2024-01-09 RX ADMIN — BACLOFEN 10 MG: 10 TABLET ORAL at 04:57

## 2024-01-09 RX ADMIN — LISINOPRIL 2.5 MG: 2.5 TABLET ORAL at 08:55

## 2024-01-09 RX ADMIN — INSULIN LISPRO 8 UNITS: 100 INJECTION, SOLUTION INTRAVENOUS; SUBCUTANEOUS at 17:35

## 2024-01-09 RX ADMIN — SERTRALINE HYDROCHLORIDE 25 MG: 25 TABLET ORAL at 20:09

## 2024-01-09 RX ADMIN — OXYCODONE AND ACETAMINOPHEN 1 TABLET: 7.5; 325 TABLET ORAL at 23:45

## 2024-01-09 RX ADMIN — APIXABAN 5 MG: 5 TABLET, FILM COATED ORAL at 20:09

## 2024-01-09 RX ADMIN — INSULIN DETEMIR 70 UNITS: 100 INJECTION, SOLUTION SUBCUTANEOUS at 08:54

## 2024-01-09 RX ADMIN — PREGABALIN 100 MG: 100 CAPSULE ORAL at 20:09

## 2024-01-09 RX ADMIN — INSULIN DETEMIR 60 UNITS: 100 INJECTION, SOLUTION SUBCUTANEOUS at 20:10

## 2024-01-09 RX ADMIN — INSULIN LISPRO 4 UNITS: 100 INJECTION, SOLUTION INTRAVENOUS; SUBCUTANEOUS at 20:09

## 2024-01-09 RX ADMIN — OXYCODONE AND ACETAMINOPHEN 1 TABLET: 7.5; 325 TABLET ORAL at 06:29

## 2024-01-09 RX ADMIN — CYANOCOBALAMIN TAB 500 MCG 1000 MCG: 500 TAB at 08:55

## 2024-01-09 RX ADMIN — OXYCODONE AND ACETAMINOPHEN 1 TABLET: 7.5; 325 TABLET ORAL at 16:21

## 2024-01-09 RX ADMIN — EMPAGLIFLOZIN 10 MG: 10 TABLET, FILM COATED ORAL at 08:55

## 2024-01-09 RX ADMIN — Medication 10 MG: at 20:09

## 2024-01-09 RX ADMIN — APIXABAN 5 MG: 5 TABLET, FILM COATED ORAL at 08:55

## 2024-01-09 RX ADMIN — BACLOFEN 10 MG: 10 TABLET ORAL at 19:30

## 2024-01-09 NOTE — PLAN OF CARE
Goal Outcome Evaluation:              Outcome Evaluation: Patient is alert and oriented and able to make needs known. Patient refused assist with repositioning, patient uses overhead trapeze to shift weight, patient rolls to side for placement of bedpan and for hygiene care. Patient uses urinal. See eMAR for medication administration details. Call light in reach.

## 2024-01-10 LAB
GLUCOSE BLDC GLUCOMTR-MCNC: 104 MG/DL (ref 70–99)
GLUCOSE BLDC GLUCOMTR-MCNC: 105 MG/DL (ref 70–99)
GLUCOSE BLDC GLUCOMTR-MCNC: 144 MG/DL (ref 70–99)
GLUCOSE BLDC GLUCOMTR-MCNC: 221 MG/DL (ref 70–99)

## 2024-01-10 PROCEDURE — 63710000001 INSULIN LISPRO (HUMAN) PER 5 UNITS: Performed by: INTERNAL MEDICINE

## 2024-01-10 PROCEDURE — 63710000001 INSULIN DETEMIR PER 5 UNITS: Performed by: PHYSICIAN ASSISTANT

## 2024-01-10 PROCEDURE — 82948 REAGENT STRIP/BLOOD GLUCOSE: CPT

## 2024-01-10 RX ADMIN — APIXABAN 5 MG: 5 TABLET, FILM COATED ORAL at 20:44

## 2024-01-10 RX ADMIN — LISINOPRIL 2.5 MG: 2.5 TABLET ORAL at 08:26

## 2024-01-10 RX ADMIN — CYANOCOBALAMIN TAB 500 MCG 1000 MCG: 500 TAB at 08:26

## 2024-01-10 RX ADMIN — BACLOFEN 10 MG: 10 TABLET ORAL at 03:28

## 2024-01-10 RX ADMIN — ATORVASTATIN CALCIUM 10 MG: 10 TABLET, FILM COATED ORAL at 20:44

## 2024-01-10 RX ADMIN — OXYCODONE AND ACETAMINOPHEN 1 TABLET: 7.5; 325 TABLET ORAL at 06:25

## 2024-01-10 RX ADMIN — IBUPROFEN 400 MG: 400 TABLET ORAL at 00:59

## 2024-01-10 RX ADMIN — FERROUS SULFATE TAB 325 MG (65 MG ELEMENTAL FE) 325 MG: 325 (65 FE) TAB at 08:26

## 2024-01-10 RX ADMIN — PREGABALIN 100 MG: 100 CAPSULE ORAL at 08:26

## 2024-01-10 RX ADMIN — INSULIN DETEMIR 60 UNITS: 100 INJECTION, SOLUTION SUBCUTANEOUS at 20:45

## 2024-01-10 RX ADMIN — Medication 10 MG: at 20:44

## 2024-01-10 RX ADMIN — PREGABALIN 100 MG: 100 CAPSULE ORAL at 16:18

## 2024-01-10 RX ADMIN — Medication 500 MG: at 20:44

## 2024-01-10 RX ADMIN — IBUPROFEN 400 MG: 400 TABLET ORAL at 16:18

## 2024-01-10 RX ADMIN — EMPAGLIFLOZIN 10 MG: 10 TABLET, FILM COATED ORAL at 08:26

## 2024-01-10 RX ADMIN — PREGABALIN 100 MG: 100 CAPSULE ORAL at 20:44

## 2024-01-10 RX ADMIN — OXYCODONE AND ACETAMINOPHEN 1 TABLET: 7.5; 325 TABLET ORAL at 14:26

## 2024-01-10 RX ADMIN — BACLOFEN 10 MG: 10 TABLET ORAL at 16:50

## 2024-01-10 RX ADMIN — INSULIN DETEMIR 70 UNITS: 100 INJECTION, SOLUTION SUBCUTANEOUS at 08:26

## 2024-01-10 RX ADMIN — INSULIN LISPRO 8 UNITS: 100 INJECTION, SOLUTION INTRAVENOUS; SUBCUTANEOUS at 20:45

## 2024-01-10 RX ADMIN — APIXABAN 5 MG: 5 TABLET, FILM COATED ORAL at 08:26

## 2024-01-10 RX ADMIN — SERTRALINE HYDROCHLORIDE 25 MG: 25 TABLET ORAL at 20:44

## 2024-01-10 RX ADMIN — Medication 500 MG: at 08:26

## 2024-01-10 NOTE — PLAN OF CARE
Goal Outcome Evaluation:  Plan of Care Reviewed With: patient        Progress: improving  Outcome Evaluation: Resident alert, able to make needs known to staff. remains bedbound, assist of 3 or more for transfers. Resident stated will get up tomorrow and get in chair. medicated for prn pain x1 with percocet, effective, prn ibuprofen x1, effective. prn baclofen per request, effective. ordered door dash for dinner, stated he did not order dinner at the hospital. resident pleasant and cooperative.

## 2024-01-10 NOTE — CONSULTS
01/10/24 1037   Coping/Psychosocial   Observed Emotional State withdrawn   Verbalized Emotional State disbelief;frustration;hopelessness;powerlessness;sadness   Trust Relationship/Rapport emotional support provided;thoughts/feelings acknowledged;questions encouraged   Additional Documentation Spiritual Care (Group)   Spiritual Care   Spiritual Care Visit Type follow-up   Spiritual Care Source  initiative   Spiritual Care Interventions supportive conversation provided   Response to Spiritual Care receptive of support   Use of Spiritual Resources spirituality for coping, indicated strong use of   Spiritual Care Follow-Up will follow closely   Spiritual Care Request mood/anxiety support;spiritual/moral support

## 2024-01-10 NOTE — PLAN OF CARE
Goal Outcome Evaluation:  Plan of Care Reviewed With: patient        Progress: no change  Outcome Evaluation: Alert and oriented x4; able to make needs known to staff. Utilizes urinal and bedpan as needed. Trapeze bar used to reposition self;  does not tolerate lying on side well.  Percocet, Motrin, and Baclofen administered for complaints of pain as needed; see MAR. No changes this shift. Call light within reach; care plan ongoing.

## 2024-01-11 LAB
GLUCOSE BLDC GLUCOMTR-MCNC: 159 MG/DL (ref 70–99)
GLUCOSE BLDC GLUCOMTR-MCNC: 167 MG/DL (ref 70–99)
GLUCOSE BLDC GLUCOMTR-MCNC: 242 MG/DL (ref 70–99)
GLUCOSE BLDC GLUCOMTR-MCNC: 92 MG/DL (ref 70–99)

## 2024-01-11 PROCEDURE — 63710000001 INSULIN LISPRO (HUMAN) PER 5 UNITS: Performed by: INTERNAL MEDICINE

## 2024-01-11 PROCEDURE — 63710000001 INSULIN DETEMIR PER 5 UNITS: Performed by: PHYSICIAN ASSISTANT

## 2024-01-11 PROCEDURE — 82948 REAGENT STRIP/BLOOD GLUCOSE: CPT

## 2024-01-11 RX ADMIN — EMPAGLIFLOZIN 10 MG: 10 TABLET, FILM COATED ORAL at 08:14

## 2024-01-11 RX ADMIN — FERROUS SULFATE TAB 325 MG (65 MG ELEMENTAL FE) 325 MG: 325 (65 FE) TAB at 08:14

## 2024-01-11 RX ADMIN — BACLOFEN 10 MG: 10 TABLET ORAL at 12:45

## 2024-01-11 RX ADMIN — SERTRALINE HYDROCHLORIDE 25 MG: 25 TABLET ORAL at 20:07

## 2024-01-11 RX ADMIN — IBUPROFEN 400 MG: 400 TABLET ORAL at 05:32

## 2024-01-11 RX ADMIN — BACLOFEN 10 MG: 10 TABLET ORAL at 02:16

## 2024-01-11 RX ADMIN — OXYCODONE AND ACETAMINOPHEN 1 TABLET: 7.5; 325 TABLET ORAL at 18:35

## 2024-01-11 RX ADMIN — LISINOPRIL 2.5 MG: 2.5 TABLET ORAL at 08:14

## 2024-01-11 RX ADMIN — IBUPROFEN 400 MG: 400 TABLET ORAL at 20:07

## 2024-01-11 RX ADMIN — PREGABALIN 100 MG: 100 CAPSULE ORAL at 16:44

## 2024-01-11 RX ADMIN — INSULIN LISPRO 8 UNITS: 100 INJECTION, SOLUTION INTRAVENOUS; SUBCUTANEOUS at 20:05

## 2024-01-11 RX ADMIN — OXYCODONE AND ACETAMINOPHEN 1 TABLET: 7.5; 325 TABLET ORAL at 08:33

## 2024-01-11 RX ADMIN — CYANOCOBALAMIN TAB 500 MCG 1000 MCG: 500 TAB at 08:14

## 2024-01-11 RX ADMIN — PREGABALIN 100 MG: 100 CAPSULE ORAL at 08:14

## 2024-01-11 RX ADMIN — INSULIN LISPRO 4 UNITS: 100 INJECTION, SOLUTION INTRAVENOUS; SUBCUTANEOUS at 12:08

## 2024-01-11 RX ADMIN — PREGABALIN 100 MG: 100 CAPSULE ORAL at 20:06

## 2024-01-11 RX ADMIN — Medication 500 MG: at 20:06

## 2024-01-11 RX ADMIN — Medication 500 MG: at 08:14

## 2024-01-11 RX ADMIN — INSULIN DETEMIR 70 UNITS: 100 INJECTION, SOLUTION SUBCUTANEOUS at 08:13

## 2024-01-11 RX ADMIN — INSULIN LISPRO 4 UNITS: 100 INJECTION, SOLUTION INTRAVENOUS; SUBCUTANEOUS at 17:47

## 2024-01-11 RX ADMIN — Medication 10 MG: at 20:06

## 2024-01-11 RX ADMIN — OXYCODONE AND ACETAMINOPHEN 1 TABLET: 7.5; 325 TABLET ORAL at 00:37

## 2024-01-11 RX ADMIN — BACLOFEN 10 MG: 10 TABLET ORAL at 21:42

## 2024-01-11 RX ADMIN — APIXABAN 5 MG: 5 TABLET, FILM COATED ORAL at 08:14

## 2024-01-11 RX ADMIN — APIXABAN 5 MG: 5 TABLET, FILM COATED ORAL at 20:06

## 2024-01-11 RX ADMIN — INSULIN DETEMIR 60 UNITS: 100 INJECTION, SOLUTION SUBCUTANEOUS at 20:06

## 2024-01-11 RX ADMIN — ATORVASTATIN CALCIUM 10 MG: 10 TABLET, FILM COATED ORAL at 20:06

## 2024-01-11 NOTE — SIGNIFICANT NOTE
[facility]   Wound Evaluation / Progress Note       Patient Name: Jose Shaikh    : 1960    MRN: [id number]  Today's Date: 2024                     Admit Date: 2023    Visit Dx:    ICD-10-CM ICD-9-CM   1. Difficulty in walking  R26.2 719.7       Patient Active Problem List   Diagnosis    Diabetic ulcer of left heel associated with type 2 DM    Acute osteomyelitis of left calcaneus     Diabetic ulcer of left heel associated with type 2 DM    Diabetic ulcer of right midfoot associated with type 2 DM    Paroxysmal atrial fibrillation    Essential hypertension    Hyperlipidemia LDL goal <100    Cellulitis and abscess of foot    High alkaline phosphatase level    Osteomyelitis    Onychomycosis    Onychocryptosis    Foot pain, bilateral    Osteomyelitis of foot, right, acute    Cellulitis of right foot    Type 2 diabetes mellitus, with long-term current use of insulin    Class 3 severe obesity due to excess calories with serious comorbidity and body mass index (BMI) of 45.0 to 49.9 in adult    Anxiety disorder, unspecified    Claustrophobia    Dependence on wheelchair    Depression, unspecified    Long term (current) use of anticoagulants    Long term (current) use of oral hypoglycemic drugs    Wound of foot    Non-prs chronic ulcer oth prt r foot limited to brkdwn skin    Orthostatic hypotension    Other chronic osteomyelitis, right ankle and foot    Personal history of nicotine dependence    Thrombocytopenia, unspecified    Unspecified open wound, right foot, initial encounter    Diabetic foot infection    Subacute osteomyelitis of right foot    Right foot pain    Sepsis    Onychomycosis    Foot pain, left    Impaired mobility and ADLs    Absence of toe of right foot    Corns and callosity    Disability of walking    Fracture    Limb swelling    Polyneuropathy    Pressure ulcer, stage 1    Shortness of breath    Generalized weakness    Debility        Past Medical History:   Diagnosis Date     Absence of toe of right foot     Acute osteomyelitis of left calcaneus  08/18/2021    Anxiety and depression     Arthritis     Cancer     Chronic pain     STATES HIS PAIN IS 10/10 AAT    Claustrophobia     Corns and callus     Diabetic ulcer of left heel associated with type 2 DM 08/18/2021    Diabetic ulcer of left heel associated with type 2 DM 07/06/2021    Diabetic ulcer of right midfoot associated with type 2 DM 08/18/2021    Difficulty walking     Essential hypertension 08/31/2021    Hammertoe     Hyperlipidemia LDL goal <100 08/31/2021    Ingrown toenail     Obesity     Paroxysmal atrial fibrillation 08/31/2021    Polyneuropathy     Pressure ulcer, stage 1     Tinea unguium     Type 2 diabetes mellitus with polyneuropathy         Past Surgical History:   Procedure Laterality Date    CYST REMOVAL      center of back; benign    EYE SURGERY      INCISION AND DRAINAGE ABSCESS      back    INCISION AND DRAINAGE LEG Right 12/10/2021    Procedure: INCISION AND DRAINAGE LOWER EXTREMITY;  Surgeon: Ash Leyva DPM;  Location: [facility] OR;  Service: Podiatry;  Laterality: Right;    OTHER SURGICAL HISTORY      Surgical clips left foot    TOE SURGERY Right     Removal of 5th toe    TRANS METATARSAL AMPUTATION Right 12/02/2021    Procedure: AMPUTATION TRANS METATARSAL;  Surgeon: Ash Leyva DPM;  Location: [facility] OR;  Service: Podiatry;  Laterality: Right;    VASCULAR SURGERY      WRIST SURGERY Left     repair of injury         Physical Assessment:  Wound 11/07/23 1240 Right anterior foot (Active)   Wound Image   01/11/24 1400   Dressing Appearance intact;dry 01/11/24 1400   Closure Unable to assess 01/10/24 2047   Base red;moist 01/11/24 1400   Red (%), Wound Tissue Color 100 01/11/24 1400   Periwound intact;pink;dry 01/11/24 1400   Periwound Temperature warm 01/11/24 1400   Periwound Skin Turgor soft 01/11/24 1400   Edges rolled/closed 01/11/24 1400   Wound Length (cm) 0.8 cm 01/11/24  1400   Wound Width (cm) 0.2 cm 01/11/24 1400   Wound Depth (cm) 0.1 cm 01/11/24 1400   Wound Surface Area (cm^2) 0.16 cm^2 01/11/24 1400   Wound Volume (cm^3) 0.016 cm^3 01/11/24 1400   Drainage Characteristics/Odor serous 01/11/24 1400   Drainage Amount scant 01/11/24 1400   Care, Wound cleansed with;sterile normal saline 01/11/24 1400   Dressing Care dressing changed;non-adherent;petroleum-based;silicone;border dressing 01/11/24 1400   Periwound Care absorptive dressing applied 01/11/24 1400      Right Gluteal Fold    Cleansed with normal saline. Skin blanchable. Remains open to air.      Wound Check / Follow-up:  Seen today on nursing facility for weekly wound RN follow-up. Speciality bariatric bed in use. Wound to right anterior foot remains with redden scan and pink intact tissue to yonis-wound. Cleansed with normal saline and recommend to continue with current plan of care of non-adherent petroleum gauze to scab and secure with silicone border dressing.    Redden areas remain to left abdominal fold and groin; however, tissue is intact. Recommend to continue with cleansing of abdominal and groin folds and apply flight dusting of starch power to folds and secure with absorbant pad, pillow cases for moisture control.    Buttocks is noted to be red and blanchable. Small crusted area noted to right gluteal fold; however, is blanchable. Recommend to continue with a turn and reposition every 2 hour schedule; keep skin dry and free of moisture/incontinence, and float heels at all time while in bed.      Impression: Skin tear to right anterior foot, moisture to abdominal skin folds    Short term goals:  Regain skin integrity, skin protection, pressure reduction, daily dressing changes    Preston Alvarez RN,[facility]    1/11/2024    14:18 EST

## 2024-01-11 NOTE — PLAN OF CARE
Goal Outcome Evaluation:  Plan of Care Reviewed With: patient        Progress: improving  Outcome Evaluation: Resident alert, oriented, able to make needs known to staff. Resident got up to chair today with assist of 3, stayed up for 2 hours and returned to bed. participated in transfer with staff assist. Medicated for prn pain x1, effective, medicated with prn baclofen per request, effective. Blood glucose elevated for lunch and dinner, covered with sliding scale. resident doordashed chips and snacks this afternoon. remained continent today. Resident pleasant and cooperative.

## 2024-01-11 NOTE — PLAN OF CARE
Goal Outcome Evaluation:  Plan of Care Reviewed With: patient        Progress: no change  Outcome Evaluation: Alert and oriented x4; able to make needs known to staff. Medicated for pain as needed; see MAR for Baclofen and Percocet. Utilizes urinal at bedside. Resident to get OOB today with assistance. Call light within reach; care plan ongoing.

## 2024-01-12 LAB
GLUCOSE BLDC GLUCOMTR-MCNC: 119 MG/DL (ref 70–99)
GLUCOSE BLDC GLUCOMTR-MCNC: 125 MG/DL (ref 70–99)
GLUCOSE BLDC GLUCOMTR-MCNC: 160 MG/DL (ref 70–99)
GLUCOSE BLDC GLUCOMTR-MCNC: 97 MG/DL (ref 70–99)

## 2024-01-12 PROCEDURE — 63710000001 INSULIN DETEMIR PER 5 UNITS: Performed by: PHYSICIAN ASSISTANT

## 2024-01-12 PROCEDURE — 63710000001 INSULIN LISPRO (HUMAN) PER 5 UNITS: Performed by: INTERNAL MEDICINE

## 2024-01-12 PROCEDURE — 82948 REAGENT STRIP/BLOOD GLUCOSE: CPT

## 2024-01-12 RX ADMIN — BACLOFEN 10 MG: 10 TABLET ORAL at 19:53

## 2024-01-12 RX ADMIN — Medication 10 MG: at 20:44

## 2024-01-12 RX ADMIN — INSULIN DETEMIR 70 UNITS: 100 INJECTION, SOLUTION SUBCUTANEOUS at 08:29

## 2024-01-12 RX ADMIN — LISINOPRIL 2.5 MG: 2.5 TABLET ORAL at 08:29

## 2024-01-12 RX ADMIN — SERTRALINE HYDROCHLORIDE 25 MG: 25 TABLET ORAL at 20:44

## 2024-01-12 RX ADMIN — ATORVASTATIN CALCIUM 10 MG: 10 TABLET, FILM COATED ORAL at 20:43

## 2024-01-12 RX ADMIN — INSULIN LISPRO 4 UNITS: 100 INJECTION, SOLUTION INTRAVENOUS; SUBCUTANEOUS at 11:47

## 2024-01-12 RX ADMIN — APIXABAN 5 MG: 5 TABLET, FILM COATED ORAL at 08:29

## 2024-01-12 RX ADMIN — INSULIN DETEMIR 60 UNITS: 100 INJECTION, SOLUTION SUBCUTANEOUS at 20:45

## 2024-01-12 RX ADMIN — OXYCODONE AND ACETAMINOPHEN 1 TABLET: 7.5; 325 TABLET ORAL at 02:44

## 2024-01-12 RX ADMIN — IBUPROFEN 400 MG: 400 TABLET ORAL at 13:40

## 2024-01-12 RX ADMIN — APIXABAN 5 MG: 5 TABLET, FILM COATED ORAL at 20:43

## 2024-01-12 RX ADMIN — BACLOFEN 10 MG: 10 TABLET ORAL at 07:09

## 2024-01-12 RX ADMIN — PREGABALIN 100 MG: 100 CAPSULE ORAL at 08:29

## 2024-01-12 RX ADMIN — EMPAGLIFLOZIN 10 MG: 10 TABLET, FILM COATED ORAL at 08:29

## 2024-01-12 RX ADMIN — OXYCODONE AND ACETAMINOPHEN 1 TABLET: 7.5; 325 TABLET ORAL at 22:49

## 2024-01-12 RX ADMIN — OXYCODONE AND ACETAMINOPHEN 1 TABLET: 7.5; 325 TABLET ORAL at 11:11

## 2024-01-12 RX ADMIN — PREGABALIN 100 MG: 100 CAPSULE ORAL at 16:27

## 2024-01-12 RX ADMIN — PREGABALIN 100 MG: 100 CAPSULE ORAL at 20:44

## 2024-01-12 RX ADMIN — Medication 500 MG: at 20:44

## 2024-01-12 RX ADMIN — FERROUS SULFATE TAB 325 MG (65 MG ELEMENTAL FE) 325 MG: 325 (65 FE) TAB at 07:59

## 2024-01-12 RX ADMIN — Medication 500 MG: at 08:29

## 2024-01-12 RX ADMIN — CYANOCOBALAMIN TAB 500 MCG 1000 MCG: 500 TAB at 08:29

## 2024-01-12 NOTE — PLAN OF CARE
Goal Outcome Evaluation:   Alert and oriented and pleasant with staff. X2 Max assist for transfers and ambulation. X3 admin PRN pain medication this shift, with relief of symptoms noted. Pt agreed to sit up in recliner this shift, but had asked to wait to attempt transfer until  was present, however decided he did not wish to attempt transfer until after supper meal. Sitting up in bed, call light in reach.

## 2024-01-12 NOTE — PLAN OF CARE
Goal Outcome Evaluation:  Plan of Care Reviewed With: patient        Progress: no change  Outcome Evaluation: Alert and oriented; able to call for assist as needed. Medicated for pain as needed; see MAR. Utilized urinal  and bedpan this shift; has not been OOB. Call light within reach; care plan ongoing.

## 2024-01-13 LAB
GLUCOSE BLDC GLUCOMTR-MCNC: 116 MG/DL (ref 70–99)
GLUCOSE BLDC GLUCOMTR-MCNC: 149 MG/DL (ref 70–99)
GLUCOSE BLDC GLUCOMTR-MCNC: 178 MG/DL (ref 70–99)
GLUCOSE BLDC GLUCOMTR-MCNC: 186 MG/DL (ref 70–99)
GLUCOSE BLDC GLUCOMTR-MCNC: 211 MG/DL (ref 70–99)

## 2024-01-13 PROCEDURE — 63710000001 INSULIN DETEMIR PER 5 UNITS: Performed by: PHYSICIAN ASSISTANT

## 2024-01-13 PROCEDURE — 63710000001 INSULIN LISPRO (HUMAN) PER 5 UNITS: Performed by: INTERNAL MEDICINE

## 2024-01-13 PROCEDURE — 82948 REAGENT STRIP/BLOOD GLUCOSE: CPT

## 2024-01-13 RX ORDER — BENZOCAINE/MENTHOL 6 MG-10 MG
1 LOZENGE MUCOUS MEMBRANE EVERY 12 HOURS SCHEDULED
Status: DISCONTINUED | OUTPATIENT
Start: 2024-01-13 | End: 2024-01-31

## 2024-01-13 RX ORDER — LOPERAMIDE HYDROCHLORIDE 2 MG/1
2 CAPSULE ORAL 4 TIMES DAILY PRN
Status: DISPENSED | OUTPATIENT
Start: 2024-01-13 | End: 2024-01-15

## 2024-01-13 RX ADMIN — Medication 10 MG: at 20:48

## 2024-01-13 RX ADMIN — BACLOFEN 10 MG: 10 TABLET ORAL at 20:49

## 2024-01-13 RX ADMIN — INSULIN LISPRO 4 UNITS: 100 INJECTION, SOLUTION INTRAVENOUS; SUBCUTANEOUS at 11:54

## 2024-01-13 RX ADMIN — FERROUS SULFATE TAB 325 MG (65 MG ELEMENTAL FE) 325 MG: 325 (65 FE) TAB at 07:41

## 2024-01-13 RX ADMIN — INSULIN DETEMIR 60 UNITS: 100 INJECTION, SOLUTION SUBCUTANEOUS at 20:50

## 2024-01-13 RX ADMIN — LOPERAMIDE HYDROCHLORIDE 2 MG: 2 CAPSULE ORAL at 14:35

## 2024-01-13 RX ADMIN — SERTRALINE HYDROCHLORIDE 25 MG: 25 TABLET ORAL at 20:51

## 2024-01-13 RX ADMIN — APIXABAN 5 MG: 5 TABLET, FILM COATED ORAL at 20:49

## 2024-01-13 RX ADMIN — Medication 500 MG: at 09:10

## 2024-01-13 RX ADMIN — Medication 500 MG: at 20:49

## 2024-01-13 RX ADMIN — OXYCODONE AND ACETAMINOPHEN 1 TABLET: 7.5; 325 TABLET ORAL at 15:16

## 2024-01-13 RX ADMIN — ATORVASTATIN CALCIUM 10 MG: 10 TABLET, FILM COATED ORAL at 20:49

## 2024-01-13 RX ADMIN — LISINOPRIL 2.5 MG: 2.5 TABLET ORAL at 09:09

## 2024-01-13 RX ADMIN — INSULIN DETEMIR 70 UNITS: 100 INJECTION, SOLUTION SUBCUTANEOUS at 09:09

## 2024-01-13 RX ADMIN — EMPAGLIFLOZIN 10 MG: 10 TABLET, FILM COATED ORAL at 09:10

## 2024-01-13 RX ADMIN — CYANOCOBALAMIN TAB 500 MCG 1000 MCG: 500 TAB at 09:09

## 2024-01-13 RX ADMIN — INSULIN LISPRO 4 UNITS: 100 INJECTION, SOLUTION INTRAVENOUS; SUBCUTANEOUS at 20:49

## 2024-01-13 RX ADMIN — PREGABALIN 100 MG: 100 CAPSULE ORAL at 09:09

## 2024-01-13 RX ADMIN — IBUPROFEN 400 MG: 400 TABLET ORAL at 22:06

## 2024-01-13 RX ADMIN — HYDROCORTISONE 1 APPLICATION: 10 CREAM TOPICAL at 14:36

## 2024-01-13 RX ADMIN — OXYCODONE AND ACETAMINOPHEN 1 TABLET: 7.5; 325 TABLET ORAL at 04:53

## 2024-01-13 RX ADMIN — BISMUTH SUBSALICYLATE 262 MG: 262 TABLET, CHEWABLE ORAL at 07:41

## 2024-01-13 RX ADMIN — HYDROCORTISONE 1 APPLICATION: 10 CREAM TOPICAL at 20:52

## 2024-01-13 RX ADMIN — PREGABALIN 100 MG: 100 CAPSULE ORAL at 20:48

## 2024-01-13 RX ADMIN — APIXABAN 5 MG: 5 TABLET, FILM COATED ORAL at 09:10

## 2024-01-13 RX ADMIN — PREGABALIN 100 MG: 100 CAPSULE ORAL at 15:16

## 2024-01-13 RX ADMIN — BACLOFEN 10 MG: 10 TABLET ORAL at 05:21

## 2024-01-13 NOTE — PLAN OF CARE
Goal Outcome Evaluation:   Alert and oriented and pleasant with staff. x2Max assist for transfers and ambulation. X1 admin PRN pain medication, with relief of symptoms noted this shift. New order received for Hydrocortisone cream for irritated skin around buttocks and anus. Also received order for immodium this shift, for c/o multiple soft formed BM's last two shifts. Sitting up in bed, call light in reach.

## 2024-01-14 LAB
GLUCOSE BLDC GLUCOMTR-MCNC: 108 MG/DL (ref 70–99)
GLUCOSE BLDC GLUCOMTR-MCNC: 135 MG/DL (ref 70–99)
GLUCOSE BLDC GLUCOMTR-MCNC: 169 MG/DL (ref 70–99)
GLUCOSE BLDC GLUCOMTR-MCNC: 190 MG/DL (ref 70–99)

## 2024-01-14 PROCEDURE — 63710000001 INSULIN LISPRO (HUMAN) PER 5 UNITS: Performed by: INTERNAL MEDICINE

## 2024-01-14 PROCEDURE — 63710000001 INSULIN DETEMIR PER 5 UNITS: Performed by: PHYSICIAN ASSISTANT

## 2024-01-14 PROCEDURE — 82948 REAGENT STRIP/BLOOD GLUCOSE: CPT

## 2024-01-14 RX ADMIN — FERROUS SULFATE TAB 325 MG (65 MG ELEMENTAL FE) 325 MG: 325 (65 FE) TAB at 08:15

## 2024-01-14 RX ADMIN — INSULIN LISPRO 4 UNITS: 100 INJECTION, SOLUTION INTRAVENOUS; SUBCUTANEOUS at 08:15

## 2024-01-14 RX ADMIN — Medication 10 MG: at 20:09

## 2024-01-14 RX ADMIN — ATORVASTATIN CALCIUM 10 MG: 10 TABLET, FILM COATED ORAL at 20:09

## 2024-01-14 RX ADMIN — LISINOPRIL 2.5 MG: 2.5 TABLET ORAL at 08:16

## 2024-01-14 RX ADMIN — INSULIN DETEMIR 60 UNITS: 100 INJECTION, SOLUTION SUBCUTANEOUS at 20:09

## 2024-01-14 RX ADMIN — BACLOFEN 10 MG: 10 TABLET ORAL at 14:46

## 2024-01-14 RX ADMIN — HYDROCORTISONE 1 APPLICATION: 10 CREAM TOPICAL at 08:16

## 2024-01-14 RX ADMIN — PREGABALIN 100 MG: 100 CAPSULE ORAL at 17:29

## 2024-01-14 RX ADMIN — INSULIN DETEMIR 70 UNITS: 100 INJECTION, SOLUTION SUBCUTANEOUS at 08:15

## 2024-01-14 RX ADMIN — INSULIN LISPRO 4 UNITS: 100 INJECTION, SOLUTION INTRAVENOUS; SUBCUTANEOUS at 20:10

## 2024-01-14 RX ADMIN — Medication 500 MG: at 08:16

## 2024-01-14 RX ADMIN — APIXABAN 5 MG: 5 TABLET, FILM COATED ORAL at 08:15

## 2024-01-14 RX ADMIN — PREGABALIN 100 MG: 100 CAPSULE ORAL at 08:16

## 2024-01-14 RX ADMIN — IBUPROFEN 400 MG: 400 TABLET ORAL at 23:20

## 2024-01-14 RX ADMIN — HYDROCORTISONE 1 APPLICATION: 10 CREAM TOPICAL at 20:09

## 2024-01-14 RX ADMIN — EMPAGLIFLOZIN 10 MG: 10 TABLET, FILM COATED ORAL at 08:16

## 2024-01-14 RX ADMIN — PREGABALIN 100 MG: 100 CAPSULE ORAL at 20:09

## 2024-01-14 RX ADMIN — SERTRALINE HYDROCHLORIDE 25 MG: 25 TABLET ORAL at 20:09

## 2024-01-14 RX ADMIN — APIXABAN 5 MG: 5 TABLET, FILM COATED ORAL at 20:08

## 2024-01-14 RX ADMIN — LOPERAMIDE HYDROCHLORIDE 2 MG: 2 CAPSULE ORAL at 23:20

## 2024-01-14 RX ADMIN — OXYCODONE AND ACETAMINOPHEN 1 TABLET: 7.5; 325 TABLET ORAL at 20:08

## 2024-01-14 RX ADMIN — Medication 500 MG: at 20:08

## 2024-01-14 RX ADMIN — CYANOCOBALAMIN TAB 500 MCG 1000 MCG: 500 TAB at 08:16

## 2024-01-14 RX ADMIN — BACLOFEN 10 MG: 10 TABLET ORAL at 23:20

## 2024-01-14 RX ADMIN — OXYCODONE AND ACETAMINOPHEN 1 TABLET: 7.5; 325 TABLET ORAL at 08:15

## 2024-01-14 RX ADMIN — IBUPROFEN 400 MG: 400 TABLET ORAL at 14:46

## 2024-01-14 NOTE — PLAN OF CARE
Goal Outcome Evaluation:  Plan of Care Reviewed With: patient           Outcome Evaluation: No significant changes this shift. Has been medicated for pain and muscle spasms. He continues to refuse turns. Will continue with his plan of care. Call light and personal items within reach.

## 2024-01-15 LAB
GLUCOSE BLDC GLUCOMTR-MCNC: 117 MG/DL (ref 70–99)
GLUCOSE BLDC GLUCOMTR-MCNC: 118 MG/DL (ref 70–99)
GLUCOSE BLDC GLUCOMTR-MCNC: 167 MG/DL (ref 70–99)
GLUCOSE BLDC GLUCOMTR-MCNC: 169 MG/DL (ref 70–99)

## 2024-01-15 PROCEDURE — 82948 REAGENT STRIP/BLOOD GLUCOSE: CPT

## 2024-01-15 PROCEDURE — 63710000001 INSULIN DETEMIR PER 5 UNITS: Performed by: PHYSICIAN ASSISTANT

## 2024-01-15 PROCEDURE — 63710000001 INSULIN LISPRO (HUMAN) PER 5 UNITS: Performed by: INTERNAL MEDICINE

## 2024-01-15 RX ADMIN — HYDROCORTISONE 1 APPLICATION: 10 CREAM TOPICAL at 20:01

## 2024-01-15 RX ADMIN — FERROUS SULFATE TAB 325 MG (65 MG ELEMENTAL FE) 325 MG: 325 (65 FE) TAB at 08:19

## 2024-01-15 RX ADMIN — PREGABALIN 100 MG: 100 CAPSULE ORAL at 15:59

## 2024-01-15 RX ADMIN — ATORVASTATIN CALCIUM 10 MG: 10 TABLET, FILM COATED ORAL at 20:01

## 2024-01-15 RX ADMIN — INSULIN LISPRO 4 UNITS: 100 INJECTION, SOLUTION INTRAVENOUS; SUBCUTANEOUS at 20:00

## 2024-01-15 RX ADMIN — Medication 500 MG: at 20:01

## 2024-01-15 RX ADMIN — CYANOCOBALAMIN TAB 500 MCG 1000 MCG: 500 TAB at 09:47

## 2024-01-15 RX ADMIN — PREGABALIN 100 MG: 100 CAPSULE ORAL at 09:47

## 2024-01-15 RX ADMIN — HYDROCORTISONE 1 APPLICATION: 10 CREAM TOPICAL at 09:46

## 2024-01-15 RX ADMIN — SERTRALINE HYDROCHLORIDE 25 MG: 25 TABLET ORAL at 20:01

## 2024-01-15 RX ADMIN — EMPAGLIFLOZIN 10 MG: 10 TABLET, FILM COATED ORAL at 09:47

## 2024-01-15 RX ADMIN — OXYCODONE AND ACETAMINOPHEN 1 TABLET: 7.5; 325 TABLET ORAL at 09:52

## 2024-01-15 RX ADMIN — Medication 10 MG: at 20:01

## 2024-01-15 RX ADMIN — INSULIN DETEMIR 70 UNITS: 100 INJECTION, SOLUTION SUBCUTANEOUS at 09:46

## 2024-01-15 RX ADMIN — APIXABAN 5 MG: 5 TABLET, FILM COATED ORAL at 20:01

## 2024-01-15 RX ADMIN — OXYCODONE AND ACETAMINOPHEN 1 TABLET: 7.5; 325 TABLET ORAL at 19:18

## 2024-01-15 RX ADMIN — Medication 500 MG: at 09:46

## 2024-01-15 RX ADMIN — INSULIN LISPRO 4 UNITS: 100 INJECTION, SOLUTION INTRAVENOUS; SUBCUTANEOUS at 17:38

## 2024-01-15 RX ADMIN — OXYCODONE AND ACETAMINOPHEN 1 TABLET: 7.5; 325 TABLET ORAL at 02:16

## 2024-01-15 RX ADMIN — IBUPROFEN 400 MG: 400 TABLET ORAL at 11:10

## 2024-01-15 RX ADMIN — INSULIN DETEMIR 60 UNITS: 100 INJECTION, SOLUTION SUBCUTANEOUS at 20:00

## 2024-01-15 RX ADMIN — LISINOPRIL 2.5 MG: 2.5 TABLET ORAL at 09:47

## 2024-01-15 RX ADMIN — PREGABALIN 100 MG: 100 CAPSULE ORAL at 20:01

## 2024-01-15 RX ADMIN — APIXABAN 5 MG: 5 TABLET, FILM COATED ORAL at 09:47

## 2024-01-15 RX ADMIN — BACLOFEN 10 MG: 10 TABLET ORAL at 12:57

## 2024-01-15 NOTE — PLAN OF CARE
Goal Outcome Evaluation:   Alert and oriented and pleasant with staff. X3 assist for transfers and ambulation. X3 admin PRN pain medication, with relief of symptoms noted. Pt transferred to recliner this shift, with moderate assistance. Returned to bed with moderate assistance also. Was able to show improved endurance this shift. Pt was escorted around floor in recliner without issue. Surgical foot shows healed this shift. Sitting up in bed, call light in reach.

## 2024-01-15 NOTE — PLAN OF CARE
Goal Outcome Evaluation:  Plan of Care Reviewed With: patient        Progress: no change  Outcome Evaluation: No significant change. Percocet administered X 2. Baclofen, ibuprofen, and immodium administered X 2. Continue plan of care.

## 2024-01-16 LAB
GLUCOSE BLDC GLUCOMTR-MCNC: 165 MG/DL (ref 70–99)
GLUCOSE BLDC GLUCOMTR-MCNC: 181 MG/DL (ref 70–99)
GLUCOSE BLDC GLUCOMTR-MCNC: 195 MG/DL (ref 70–99)
GLUCOSE BLDC GLUCOMTR-MCNC: 84 MG/DL (ref 70–99)
GLUCOSE BLDC GLUCOMTR-MCNC: 90 MG/DL (ref 70–99)

## 2024-01-16 PROCEDURE — 63710000001 INSULIN DETEMIR PER 5 UNITS: Performed by: PHYSICIAN ASSISTANT

## 2024-01-16 PROCEDURE — 63710000001 INSULIN LISPRO (HUMAN) PER 5 UNITS: Performed by: INTERNAL MEDICINE

## 2024-01-16 PROCEDURE — 82948 REAGENT STRIP/BLOOD GLUCOSE: CPT

## 2024-01-16 RX ORDER — IBUPROFEN 400 MG/1
400 TABLET ORAL 2 TIMES DAILY PRN
Status: DISPENSED | OUTPATIENT
Start: 2024-01-16

## 2024-01-16 RX ADMIN — HYDROCORTISONE 1 APPLICATION: 10 CREAM TOPICAL at 08:26

## 2024-01-16 RX ADMIN — INSULIN LISPRO 4 UNITS: 100 INJECTION, SOLUTION INTRAVENOUS; SUBCUTANEOUS at 12:09

## 2024-01-16 RX ADMIN — ATORVASTATIN CALCIUM 10 MG: 10 TABLET, FILM COATED ORAL at 21:33

## 2024-01-16 RX ADMIN — BACLOFEN 10 MG: 10 TABLET ORAL at 14:25

## 2024-01-16 RX ADMIN — IBUPROFEN 400 MG: 400 TABLET ORAL at 01:33

## 2024-01-16 RX ADMIN — IBUPROFEN 400 MG: 400 TABLET ORAL at 11:19

## 2024-01-16 RX ADMIN — LISINOPRIL 2.5 MG: 2.5 TABLET ORAL at 08:25

## 2024-01-16 RX ADMIN — BISMUTH SUBSALICYLATE 262 MG: 262 TABLET, CHEWABLE ORAL at 01:33

## 2024-01-16 RX ADMIN — PREGABALIN 100 MG: 100 CAPSULE ORAL at 16:40

## 2024-01-16 RX ADMIN — EMPAGLIFLOZIN 10 MG: 10 TABLET, FILM COATED ORAL at 08:25

## 2024-01-16 RX ADMIN — INSULIN LISPRO 4 UNITS: 100 INJECTION, SOLUTION INTRAVENOUS; SUBCUTANEOUS at 21:33

## 2024-01-16 RX ADMIN — FERROUS SULFATE TAB 325 MG (65 MG ELEMENTAL FE) 325 MG: 325 (65 FE) TAB at 08:25

## 2024-01-16 RX ADMIN — HYDROCORTISONE 1 APPLICATION: 10 CREAM TOPICAL at 23:10

## 2024-01-16 RX ADMIN — PREGABALIN 100 MG: 100 CAPSULE ORAL at 21:32

## 2024-01-16 RX ADMIN — APIXABAN 5 MG: 5 TABLET, FILM COATED ORAL at 21:33

## 2024-01-16 RX ADMIN — Medication 500 MG: at 08:25

## 2024-01-16 RX ADMIN — INSULIN DETEMIR 70 UNITS: 100 INJECTION, SOLUTION SUBCUTANEOUS at 08:26

## 2024-01-16 RX ADMIN — Medication 10 MG: at 21:32

## 2024-01-16 RX ADMIN — OXYCODONE AND ACETAMINOPHEN 1 TABLET: 7.5; 325 TABLET ORAL at 10:08

## 2024-01-16 RX ADMIN — OXYCODONE AND ACETAMINOPHEN 1 TABLET: 7.5; 325 TABLET ORAL at 23:36

## 2024-01-16 RX ADMIN — APIXABAN 5 MG: 5 TABLET, FILM COATED ORAL at 08:25

## 2024-01-16 RX ADMIN — INSULIN LISPRO 4 UNITS: 100 INJECTION, SOLUTION INTRAVENOUS; SUBCUTANEOUS at 17:42

## 2024-01-16 RX ADMIN — OXYCODONE AND ACETAMINOPHEN 1 TABLET: 7.5; 325 TABLET ORAL at 16:40

## 2024-01-16 RX ADMIN — Medication 500 MG: at 21:33

## 2024-01-16 RX ADMIN — OXYCODONE AND ACETAMINOPHEN 1 TABLET: 7.5; 325 TABLET ORAL at 03:23

## 2024-01-16 RX ADMIN — BACLOFEN 10 MG: 10 TABLET ORAL at 05:42

## 2024-01-16 RX ADMIN — BACLOFEN 10 MG: 10 TABLET ORAL at 21:32

## 2024-01-16 RX ADMIN — INSULIN DETEMIR 60 UNITS: 100 INJECTION, SOLUTION SUBCUTANEOUS at 21:34

## 2024-01-16 RX ADMIN — PREGABALIN 100 MG: 100 CAPSULE ORAL at 08:25

## 2024-01-16 RX ADMIN — CYANOCOBALAMIN TAB 500 MCG 1000 MCG: 500 TAB at 08:25

## 2024-01-16 RX ADMIN — SERTRALINE HYDROCHLORIDE 25 MG: 25 TABLET ORAL at 23:11

## 2024-01-16 NOTE — PLAN OF CARE
Goal Outcome Evaluation:  Plan of Care Reviewed With: patient        Progress: no change  Outcome Evaluation: No significant change. Vital signs stable. Percocet and ibuprofen administered for left hip and shoulder pain. Pepto bismol administered for loose stool/stomach upset. Continue plan of care.

## 2024-01-17 LAB
GLUCOSE BLDC GLUCOMTR-MCNC: 122 MG/DL (ref 70–99)
GLUCOSE BLDC GLUCOMTR-MCNC: 138 MG/DL (ref 70–99)
GLUCOSE BLDC GLUCOMTR-MCNC: 150 MG/DL (ref 70–99)
GLUCOSE BLDC GLUCOMTR-MCNC: 87 MG/DL (ref 70–99)

## 2024-01-17 PROCEDURE — 82948 REAGENT STRIP/BLOOD GLUCOSE: CPT | Performed by: INTERNAL MEDICINE

## 2024-01-17 PROCEDURE — 63710000001 INSULIN DETEMIR PER 5 UNITS: Performed by: PHYSICIAN ASSISTANT

## 2024-01-17 PROCEDURE — 82948 REAGENT STRIP/BLOOD GLUCOSE: CPT

## 2024-01-17 PROCEDURE — 63710000001 INSULIN LISPRO (HUMAN) PER 5 UNITS: Performed by: INTERNAL MEDICINE

## 2024-01-17 RX ADMIN — PREGABALIN 100 MG: 100 CAPSULE ORAL at 20:00

## 2024-01-17 RX ADMIN — APIXABAN 5 MG: 5 TABLET, FILM COATED ORAL at 08:57

## 2024-01-17 RX ADMIN — ATORVASTATIN CALCIUM 10 MG: 10 TABLET, FILM COATED ORAL at 20:01

## 2024-01-17 RX ADMIN — OXYCODONE AND ACETAMINOPHEN 1 TABLET: 7.5; 325 TABLET ORAL at 23:17

## 2024-01-17 RX ADMIN — CYANOCOBALAMIN TAB 500 MCG 1000 MCG: 500 TAB at 08:57

## 2024-01-17 RX ADMIN — Medication 500 MG: at 20:00

## 2024-01-17 RX ADMIN — HYDROCORTISONE 1 APPLICATION: 10 CREAM TOPICAL at 20:03

## 2024-01-17 RX ADMIN — LISINOPRIL 2.5 MG: 2.5 TABLET ORAL at 08:57

## 2024-01-17 RX ADMIN — APIXABAN 5 MG: 5 TABLET, FILM COATED ORAL at 20:00

## 2024-01-17 RX ADMIN — INSULIN DETEMIR 60 UNITS: 100 INJECTION, SOLUTION SUBCUTANEOUS at 20:01

## 2024-01-17 RX ADMIN — INSULIN LISPRO 4 UNITS: 100 INJECTION, SOLUTION INTRAVENOUS; SUBCUTANEOUS at 20:01

## 2024-01-17 RX ADMIN — IBUPROFEN 400 MG: 400 TABLET ORAL at 16:02

## 2024-01-17 RX ADMIN — Medication 10 MG: at 20:00

## 2024-01-17 RX ADMIN — INSULIN DETEMIR 70 UNITS: 100 INJECTION, SOLUTION SUBCUTANEOUS at 08:57

## 2024-01-17 RX ADMIN — HYDROCORTISONE 1 APPLICATION: 10 CREAM TOPICAL at 08:58

## 2024-01-17 RX ADMIN — SERTRALINE HYDROCHLORIDE 25 MG: 25 TABLET ORAL at 20:00

## 2024-01-17 RX ADMIN — Medication 500 MG: at 08:56

## 2024-01-17 RX ADMIN — PREGABALIN 100 MG: 100 CAPSULE ORAL at 16:17

## 2024-01-17 RX ADMIN — PREGABALIN 100 MG: 100 CAPSULE ORAL at 08:57

## 2024-01-17 RX ADMIN — OXYCODONE AND ACETAMINOPHEN 1 TABLET: 7.5; 325 TABLET ORAL at 13:10

## 2024-01-17 RX ADMIN — BACLOFEN 10 MG: 10 TABLET ORAL at 12:37

## 2024-01-17 RX ADMIN — EMPAGLIFLOZIN 10 MG: 10 TABLET, FILM COATED ORAL at 08:57

## 2024-01-17 RX ADMIN — OXYCODONE AND ACETAMINOPHEN 1 TABLET: 7.5; 325 TABLET ORAL at 05:27

## 2024-01-17 RX ADMIN — FERROUS SULFATE TAB 325 MG (65 MG ELEMENTAL FE) 325 MG: 325 (65 FE) TAB at 08:56

## 2024-01-18 LAB
ALBUMIN SERPL-MCNC: 3 G/DL (ref 3.5–5.2)
ANION GAP SERPL CALCULATED.3IONS-SCNC: 9.6 MMOL/L (ref 5–15)
BUN SERPL-MCNC: 14 MG/DL (ref 8–23)
BUN/CREAT SERPL: 23.3 (ref 7–25)
CALCIUM SPEC-SCNC: 8.5 MG/DL (ref 8.6–10.5)
CHLORIDE SERPL-SCNC: 104 MMOL/L (ref 98–107)
CO2 SERPL-SCNC: 24.4 MMOL/L (ref 22–29)
CREAT SERPL-MCNC: 0.6 MG/DL (ref 0.76–1.27)
DEPRECATED RDW RBC AUTO: 65.4 FL (ref 37–54)
EGFRCR SERPLBLD CKD-EPI 2021: 107.1 ML/MIN/1.73
ERYTHROCYTE [DISTWIDTH] IN BLOOD BY AUTOMATED COUNT: 21.2 % (ref 12.3–15.4)
FLUAV SUBTYP SPEC NAA+PROBE: DETECTED
FLUBV RNA ISLT QL NAA+PROBE: NOT DETECTED
GLUCOSE BLDC GLUCOMTR-MCNC: 105 MG/DL (ref 70–99)
GLUCOSE BLDC GLUCOMTR-MCNC: 127 MG/DL (ref 70–99)
GLUCOSE BLDC GLUCOMTR-MCNC: 95 MG/DL (ref 70–99)
GLUCOSE BLDC GLUCOMTR-MCNC: 96 MG/DL (ref 70–99)
GLUCOSE BLDC GLUCOMTR-MCNC: 99 MG/DL (ref 70–99)
GLUCOSE SERPL-MCNC: 80 MG/DL (ref 65–99)
HCT VFR BLD AUTO: 38 % (ref 37.5–51)
HGB BLD-MCNC: 11.8 G/DL (ref 13–17.7)
MCH RBC QN AUTO: 26 PG (ref 26.6–33)
MCHC RBC AUTO-ENTMCNC: 31.1 G/DL (ref 31.5–35.7)
MCV RBC AUTO: 83.7 FL (ref 79–97)
NT-PROBNP SERPL-MCNC: 92.5 PG/ML (ref 0–900)
PHOSPHATE SERPL-MCNC: 3.5 MG/DL (ref 2.5–4.5)
PLATELET # BLD AUTO: 71 10*3/MM3 (ref 140–450)
PMV BLD AUTO: ABNORMAL FL
POTASSIUM SERPL-SCNC: 4.2 MMOL/L (ref 3.5–5.2)
RBC # BLD AUTO: 4.54 10*6/MM3 (ref 4.14–5.8)
RSV RNA NPH QL NAA+NON-PROBE: NOT DETECTED
SARS-COV-2 RNA RESP QL NAA+PROBE: NOT DETECTED
SODIUM SERPL-SCNC: 138 MMOL/L (ref 136–145)
WBC NRBC COR # BLD AUTO: 3.96 10*3/MM3 (ref 3.4–10.8)

## 2024-01-18 PROCEDURE — 82948 REAGENT STRIP/BLOOD GLUCOSE: CPT

## 2024-01-18 PROCEDURE — 82948 REAGENT STRIP/BLOOD GLUCOSE: CPT | Performed by: INTERNAL MEDICINE

## 2024-01-18 PROCEDURE — 83880 ASSAY OF NATRIURETIC PEPTIDE: CPT | Performed by: PHYSICIAN ASSISTANT

## 2024-01-18 PROCEDURE — 63710000001 INSULIN DETEMIR PER 5 UNITS: Performed by: PHYSICIAN ASSISTANT

## 2024-01-18 PROCEDURE — 85027 COMPLETE CBC AUTOMATED: CPT | Performed by: PHYSICIAN ASSISTANT

## 2024-01-18 PROCEDURE — 80069 RENAL FUNCTION PANEL: CPT | Performed by: PHYSICIAN ASSISTANT

## 2024-01-18 PROCEDURE — 99309 SBSQ NF CARE MODERATE MDM 30: CPT | Performed by: PHYSICIAN ASSISTANT

## 2024-01-18 PROCEDURE — 87637 SARSCOV2&INF A&B&RSV AMP PRB: CPT | Performed by: PHYSICIAN ASSISTANT

## 2024-01-18 RX ORDER — BENZONATATE 100 MG/1
100 CAPSULE ORAL 3 TIMES DAILY PRN
Status: DISPENSED | OUTPATIENT
Start: 2024-01-18

## 2024-01-18 RX ORDER — FUROSEMIDE 20 MG/1
20 TABLET ORAL ONCE
Status: COMPLETED | OUTPATIENT
Start: 2024-01-18 | End: 2024-01-18

## 2024-01-18 RX ORDER — OSELTAMIVIR PHOSPHATE 75 MG/1
75 CAPSULE ORAL EVERY 12 HOURS SCHEDULED
Status: DISPENSED | OUTPATIENT
Start: 2024-01-18 | End: 2024-01-23

## 2024-01-18 RX ADMIN — ATORVASTATIN CALCIUM 10 MG: 10 TABLET, FILM COATED ORAL at 21:32

## 2024-01-18 RX ADMIN — OXYCODONE AND ACETAMINOPHEN 1 TABLET: 7.5; 325 TABLET ORAL at 21:31

## 2024-01-18 RX ADMIN — HYDROCORTISONE 1 APPLICATION: 10 CREAM TOPICAL at 09:35

## 2024-01-18 RX ADMIN — INSULIN DETEMIR 60 UNITS: 100 INJECTION, SOLUTION SUBCUTANEOUS at 21:31

## 2024-01-18 RX ADMIN — CYANOCOBALAMIN TAB 500 MCG 1000 MCG: 500 TAB at 09:34

## 2024-01-18 RX ADMIN — IBUPROFEN 400 MG: 400 TABLET ORAL at 16:04

## 2024-01-18 RX ADMIN — OXYCODONE AND ACETAMINOPHEN 1 TABLET: 7.5; 325 TABLET ORAL at 06:12

## 2024-01-18 RX ADMIN — Medication 10 MG: at 21:32

## 2024-01-18 RX ADMIN — SERTRALINE HYDROCHLORIDE 25 MG: 25 TABLET ORAL at 21:32

## 2024-01-18 RX ADMIN — PREGABALIN 100 MG: 100 CAPSULE ORAL at 09:34

## 2024-01-18 RX ADMIN — EMPAGLIFLOZIN 10 MG: 10 TABLET, FILM COATED ORAL at 09:34

## 2024-01-18 RX ADMIN — HYDROCORTISONE 1 APPLICATION: 10 CREAM TOPICAL at 21:32

## 2024-01-18 RX ADMIN — LISINOPRIL 2.5 MG: 2.5 TABLET ORAL at 09:34

## 2024-01-18 RX ADMIN — OXYCODONE AND ACETAMINOPHEN 1 TABLET: 7.5; 325 TABLET ORAL at 12:23

## 2024-01-18 RX ADMIN — APIXABAN 5 MG: 5 TABLET, FILM COATED ORAL at 21:32

## 2024-01-18 RX ADMIN — Medication 500 MG: at 09:34

## 2024-01-18 RX ADMIN — APIXABAN 5 MG: 5 TABLET, FILM COATED ORAL at 09:34

## 2024-01-18 RX ADMIN — PREGABALIN 100 MG: 100 CAPSULE ORAL at 21:32

## 2024-01-18 RX ADMIN — BENZONATATE 100 MG: 100 CAPSULE ORAL at 01:34

## 2024-01-18 RX ADMIN — FUROSEMIDE 20 MG: 20 TABLET ORAL at 15:21

## 2024-01-18 RX ADMIN — PREGABALIN 100 MG: 100 CAPSULE ORAL at 15:21

## 2024-01-18 RX ADMIN — FERROUS SULFATE TAB 325 MG (65 MG ELEMENTAL FE) 325 MG: 325 (65 FE) TAB at 09:34

## 2024-01-18 RX ADMIN — INSULIN DETEMIR 70 UNITS: 100 INJECTION, SOLUTION SUBCUTANEOUS at 09:35

## 2024-01-18 RX ADMIN — OSELTAMIVIR PHOSPHATE 75 MG: 75 CAPSULE ORAL at 15:21

## 2024-01-18 RX ADMIN — Medication 500 MG: at 21:32

## 2024-01-18 NOTE — SIGNIFICANT NOTE
Wound Eval / Progress Noted     [facility]     Patient Name: Jose Shaikh  : 1960  MRN: [id number]  Today's Date: 2024                 Admit Date: 2023    Visit Dx:    ICD-10-CM ICD-9-CM   1. Difficulty in walking  R26.2 719.7         Debility        Past Medical History:   Diagnosis Date    Absence of toe of right foot     Acute osteomyelitis of left calcaneus  2021    Anxiety and depression     Arthritis     Cancer     Chronic pain     STATES HIS PAIN IS 10/10 AAT    Claustrophobia     Corns and callus     Diabetic ulcer of left heel associated with type 2 DM 2021    Diabetic ulcer of left heel associated with type 2 DM 2021    Diabetic ulcer of right midfoot associated with type 2 DM 2021    Difficulty walking     Essential hypertension 2021    Hammertoe     Hyperlipidemia LDL goal <100 2021    Ingrown toenail     Obesity     Paroxysmal atrial fibrillation 2021    Polyneuropathy     Pressure ulcer, stage 1     Tinea unguium     Type 2 diabetes mellitus with polyneuropathy         Past Surgical History:   Procedure Laterality Date    CYST REMOVAL      center of back; benign    EYE SURGERY      INCISION AND DRAINAGE ABSCESS      back    INCISION AND DRAINAGE LEG Right 12/10/2021    Procedure: INCISION AND DRAINAGE LOWER EXTREMITY;  Surgeon: Ash Leyva DPM;  Location: [facility];  Service: Podiatry;  Laterality: Right;    OTHER SURGICAL HISTORY      Surgical clips left foot    TOE SURGERY Right     Removal of 5th toe    TRANS METATARSAL AMPUTATION Right 2021    Procedure: AMPUTATION TRANS METATARSAL;  Surgeon: Ash Leyva DPM;  Location: [facility] OR;  Service: Podiatry;  Laterality: Right;    VASCULAR SURGERY      WRIST SURGERY Left     repair of injury         Physical Assessment:       24 1150   Skin   Skin WDL WDL;color;characteristics;all   Skin Color/Characteristics redness blanchable;other (see  comments)  (buttocks)   Skin Temperature warm   Skin Moisture dry   Skin Elasticity quick return to original state   Skin Integrity intact;other (see comments)  (right anterior foot closed / resurfaced)      Right foot    Wound Check / Follow-up:  Patient seen today for a wound check and dressing changes.     Patient right anterior foot with full closure at this time. Tissue is pink dry and intact. Cleansed with Ns. Recommending to provide quality skin care and hygiene; apply purple top moisturizer to the bilateral legs and feet and leave open to air.    Buttocks is intact with no evidence of skin breakdown; blanchable redness to present. Recommending to continue to encourage the patient to turn Q2H and offload at all times. Keep the patient clean and free from all moisture.    Impression: full closure to the right foot     Short term goals:  regain skin integrity, skin protection, pressure reduction, skin care, moisture prevention    Karon Bolton RN    1/18/2024    15:33 EST

## 2024-01-18 NOTE — PROGRESS NOTES
[facility]   Hospitalist Progress Note  Date: 2024  Patient Name: Jose Shaikh  : 1960  MRN: [id number]  Date of admission: 2023      Subjective   Subjective     Chief Complaint: Weakness    Summary: Jose Shaikh is a 63 y.o. male  initially hospitalized on 9/10/2023, prolonged hospitalization for treatment of management of generalized weakness, deconditioning, with acute issues of diarrhea, C. difficile negative.  Diarrhea improved.  Had small hematoma after ambulating on his foot.  Unfortunately with exhaustive efforts to try to place this gentleman after 39 days of hospitalization, we are unable to find a facility that will accept him.  He has no further acute needs or requirements for inpatient monitoring and management, his labs and vitals are stable, he is tolerating oral intake, and has been refusing turns and repositionings and other interventions with nursing staff despite recommendations.  Patient discharged in hemodynamically stable condition on 10/19/2023 to follow-up with PCP within 1 week. Unfortunately we cannot solve all of his social issues during this hospitalization due to lack of resources and participation from the patient's perspective despite all our efforts.  Patient has a financial means of making arrangements at home.  Patient appealed his discharge.  Lost his appeal.  LCD: 10/20/23, PFL beginning: 10/21/23 @ 12pm.  Due to an inability to place the patient in a.m. nursing home he has been placed in our skilled nursing facility until arrangements can be made or until he is able to care for himself.      Interval Followup: Patient seen and examined resting comfortably complaining of weakness fatigue low-grade fevers chills cough sore throat screening for COVID influenza and RSV returned positive for the flu a starting on Tamiflu.  Patient also complaining of worsening edema we will spot dose Lasix    Review of systems: All systems reviewed and negative except what is  noted above in interval follow-up   Objective   Objective     Vitals:   Temp:  [98.2 °F (36.8 °C)-99.1 °F (37.3 °C)] 98.2 °F (36.8 °C)  Heart Rate:  [85-90] 85  Resp:  [18] 18  BP: (103-131)/(43-60) 131/60    Physical Exam   Constitutional: Awake alert oriented no acute distress  Respiratory: No wheeze  GI: Abdomen: Obese  Neuro/psych:  Calm mood.  No dysarthria.  Extremities: Some lower extremity edema noted    Result Review    Result Review:  I have personally reviewed the results from the time of this admission to 1/18/2024 14:08 EST and agree with these findings:  [x]  Laboratory  [x]  Microbiology  []  Radiology  []  EKG/Telemetry   []  Cardiology/Vascular   []  Pathology  []  Old records  []  Other:    Assessment & Plan   Assessment / Plan   Assessment:  Hospital-acquired weakness  Influenza A  C. difficile diarrhea treated  Generalized weakness  Deconditioning  DM (Kami Garcia and PCP)  HTN  PAF (on Eliquis; previously seen Dr. Duval)  Morbid obesity with BMI 44  Debility with wheelchair dependence  Hx of severe left hip pain  Severe degenerative joint disease of lower extremities  Hx L calcaneus osteomyelitis  Hx R transmetatarsal  Chronic bilateral foot wounds with right transmetatarsal amputation, healing by secondary intention (wound care clinic; podiatrist Gordon)  Thrombocytopenia, resolved      Plan:    Start Tamiflu for acute influenza A  Spot dose Lasix x 1 today  Monitor respiratory status closely  A.m. CBC and renal panel  Continue basal insulin and SSI per protocol titrate as needed  Repeat labs in a.m.  Continue Eliquis for stroke prophylaxis  Continue daily PT and OT  Continue current pain meds   Completed course of oral vancomycin for C difficile associated diarrhea.    Continue probiotics  Monitor hemodynamics and avoid hypotension/dehydration.  Continue other treatment as ordered  Discussed with RN    DVT prophylaxis:  Medical DVT prophylaxis orders are present.    CODE STATUS:   Code  No results for input(s): IRONS, FERRITIN in the last 72 hours. Invalid input(s): LABIRONS  Urinalysis: No results for input(s): UA in the last 72 hours.     Objective:  Vitals: BP (!) 221/55   Pulse 79   Temp 98.1 °F (36.7 °C) (Oral)   Resp 19   Ht 5' 5\" (1.651 m)   Wt 283 lb 15.2 oz (128.8 kg)   SpO2 92%   BMI 47.25 kg/m²    Wt Readings from Last 3 Encounters:   11/05/21 283 lb 15.2 oz (128.8 kg)   08/27/21 280 lb (127 kg)   07/24/21 289 lb (131.1 kg)      24HR INTAKE/OUTPUT:      Intake/Output Summary (Last 24 hours) at 11/5/2021 1344  Last data filed at 11/5/2021 0600  Gross per 24 hour   Intake 4745 ml   Output 550 ml   Net 4195 ml       General: notalert, in no apparent distress  HEENT: normocephalic, atraumatic, anicteric  Intubated   Neck: supple, no mass  Lungs: non-labored respirations, clear to auscultation bilaterally  Heart: regular rate and rhythm, no murmurs or rubs  Abdomen: soft, non-tender, non-distended  Ext: no cyanosis, no peripheral edema  Neuro: alert and oriented, no gross abnormalities  Psych: normal mood and affect  Skin: no rash      Electronically signed by Verenice Smith DO, MD Status (Patient has no pulse and is not breathing): CPR (Attempt to Resuscitate)  Medical Interventions (Patient has pulse or is breathing): Full Support

## 2024-01-18 NOTE — PLAN OF CARE
Goal Outcome Evaluation:  Plan of Care Reviewed With: patient        Progress: improving  Outcome Evaluation: Resident alert, oriented, able to make needs known to staff. Had plans to take a shower today, Pain meds and muscle relaxer were timed to facilate optimum benefit and pain relief while taking shower. Resident did not take shower and refused to get up. stated he does not feel well and thinks he is coming down with something, possible sore throat. PA aware. Medicated for prn pain x2 (percocet and ibuprofen), medicated with prn baclofen for muscle spasms, effective. VSS. Blood glucose stable for all 3 meals. Resident pleasant and cooperative.

## 2024-01-18 NOTE — PLAN OF CARE
Goal Outcome Evaluation:  Plan of Care Reviewed With: patient        Progress: no change  Outcome Evaluation: Alert and oriented x4; able to make needs known to staff. Complained of burning in chest with cough at times. Order for Tessalon 100mg obtained and given. Low grade temp of 99.1.  Incontinent of urine and stool tonight and needed 2 person assist to clean tonight. Percocet administered x2 for complaints of shoulder and left hip pain. Call light within reach; care plan ongoing.

## 2024-01-19 ENCOUNTER — APPOINTMENT (OUTPATIENT)
Dept: GENERAL RADIOLOGY | Facility: HOSPITAL | Age: 64
DRG: 948 | End: 2024-01-19
Payer: MEDICARE

## 2024-01-19 LAB
ALBUMIN SERPL-MCNC: 3 G/DL (ref 3.5–5.2)
ANION GAP SERPL CALCULATED.3IONS-SCNC: 7.3 MMOL/L (ref 5–15)
BUN SERPL-MCNC: 16 MG/DL (ref 8–23)
BUN/CREAT SERPL: 28.6 (ref 7–25)
CALCIUM SPEC-SCNC: 8.3 MG/DL (ref 8.6–10.5)
CHLORIDE SERPL-SCNC: 107 MMOL/L (ref 98–107)
CO2 SERPL-SCNC: 21.7 MMOL/L (ref 22–29)
CREAT SERPL-MCNC: 0.56 MG/DL (ref 0.76–1.27)
EGFRCR SERPLBLD CKD-EPI 2021: 109.4 ML/MIN/1.73
GLUCOSE BLDC GLUCOMTR-MCNC: 123 MG/DL (ref 70–99)
GLUCOSE BLDC GLUCOMTR-MCNC: 133 MG/DL (ref 70–99)
GLUCOSE BLDC GLUCOMTR-MCNC: 145 MG/DL (ref 70–99)
GLUCOSE BLDC GLUCOMTR-MCNC: 152 MG/DL (ref 70–99)
GLUCOSE BLDC GLUCOMTR-MCNC: 67 MG/DL (ref 70–99)
GLUCOSE BLDC GLUCOMTR-MCNC: 68 MG/DL (ref 70–99)
GLUCOSE BLDC GLUCOMTR-MCNC: 69 MG/DL (ref 70–99)
GLUCOSE SERPL-MCNC: 122 MG/DL (ref 65–99)
PHOSPHATE SERPL-MCNC: 3.3 MG/DL (ref 2.5–4.5)
POTASSIUM SERPL-SCNC: 4.2 MMOL/L (ref 3.5–5.2)
SODIUM SERPL-SCNC: 136 MMOL/L (ref 136–145)

## 2024-01-19 PROCEDURE — 82948 REAGENT STRIP/BLOOD GLUCOSE: CPT

## 2024-01-19 PROCEDURE — 63710000001 ONDANSETRON ODT 4 MG TABLET DISPERSIBLE: Performed by: INTERNAL MEDICINE

## 2024-01-19 PROCEDURE — 80069 RENAL FUNCTION PANEL: CPT | Performed by: PHYSICIAN ASSISTANT

## 2024-01-19 PROCEDURE — 63710000001 INSULIN DETEMIR PER 5 UNITS: Performed by: PHYSICIAN ASSISTANT

## 2024-01-19 PROCEDURE — 82948 REAGENT STRIP/BLOOD GLUCOSE: CPT | Performed by: INTERNAL MEDICINE

## 2024-01-19 PROCEDURE — 71045 X-RAY EXAM CHEST 1 VIEW: CPT

## 2024-01-19 RX ADMIN — PREGABALIN 100 MG: 100 CAPSULE ORAL at 16:02

## 2024-01-19 RX ADMIN — OSELTAMIVIR PHOSPHATE 75 MG: 75 CAPSULE ORAL at 08:49

## 2024-01-19 RX ADMIN — PREGABALIN 100 MG: 100 CAPSULE ORAL at 09:48

## 2024-01-19 RX ADMIN — APIXABAN 5 MG: 5 TABLET, FILM COATED ORAL at 09:49

## 2024-01-19 RX ADMIN — Medication 500 MG: at 09:48

## 2024-01-19 RX ADMIN — EMPAGLIFLOZIN 10 MG: 10 TABLET, FILM COATED ORAL at 09:48

## 2024-01-19 RX ADMIN — OSELTAMIVIR PHOSPHATE 75 MG: 75 CAPSULE ORAL at 20:07

## 2024-01-19 RX ADMIN — HYDROCORTISONE 1 APPLICATION: 10 CREAM TOPICAL at 20:08

## 2024-01-19 RX ADMIN — Medication 500 MG: at 20:07

## 2024-01-19 RX ADMIN — OXYCODONE AND ACETAMINOPHEN 1 TABLET: 7.5; 325 TABLET ORAL at 20:07

## 2024-01-19 RX ADMIN — BACLOFEN 10 MG: 10 TABLET ORAL at 16:02

## 2024-01-19 RX ADMIN — CYANOCOBALAMIN TAB 500 MCG 1000 MCG: 500 TAB at 09:48

## 2024-01-19 RX ADMIN — PREGABALIN 100 MG: 100 CAPSULE ORAL at 20:07

## 2024-01-19 RX ADMIN — LISINOPRIL 2.5 MG: 2.5 TABLET ORAL at 09:48

## 2024-01-19 RX ADMIN — BACLOFEN 10 MG: 10 TABLET ORAL at 01:06

## 2024-01-19 RX ADMIN — HYDROCORTISONE 1 APPLICATION: 10 CREAM TOPICAL at 08:50

## 2024-01-19 RX ADMIN — SERTRALINE HYDROCHLORIDE 25 MG: 25 TABLET ORAL at 20:08

## 2024-01-19 RX ADMIN — Medication 10 MG: at 20:07

## 2024-01-19 RX ADMIN — INSULIN DETEMIR 60 UNITS: 100 INJECTION, SOLUTION SUBCUTANEOUS at 20:06

## 2024-01-19 RX ADMIN — APIXABAN 5 MG: 5 TABLET, FILM COATED ORAL at 20:08

## 2024-01-19 RX ADMIN — ATORVASTATIN CALCIUM 10 MG: 10 TABLET, FILM COATED ORAL at 20:08

## 2024-01-19 RX ADMIN — IBUPROFEN 400 MG: 400 TABLET ORAL at 13:05

## 2024-01-19 RX ADMIN — INSULIN DETEMIR 70 UNITS: 100 INJECTION, SOLUTION SUBCUTANEOUS at 09:48

## 2024-01-19 RX ADMIN — ONDANSETRON 4 MG: 4 TABLET, ORALLY DISINTEGRATING ORAL at 08:48

## 2024-01-19 RX ADMIN — OXYCODONE AND ACETAMINOPHEN 1 TABLET: 7.5; 325 TABLET ORAL at 08:49

## 2024-01-19 RX ADMIN — FERROUS SULFATE TAB 325 MG (65 MG ELEMENTAL FE) 325 MG: 325 (65 FE) TAB at 09:48

## 2024-01-19 NOTE — PLAN OF CARE
Goal Outcome Evaluation:      Patient had complaints of right shoulder pain throughout shift, prn pain medications, muscle relaxer given. He continues on bed rest.       He tested positive for flu A this shift. Started on Tamiflu.

## 2024-01-19 NOTE — PLAN OF CARE
Problem: Adult Inpatient Plan of Care  Goal: Plan of Care Review  Outcome: Ongoing, Progressing  Flowsheets (Taken 1/19/2024 0459)  Progress: no change  Plan of Care Reviewed With: patient  Goal: Patient-Specific Goal (Individualized)  Outcome: Ongoing, Progressing  Goal: Absence of Hospital-Acquired Illness or Injury  Outcome: Ongoing, Progressing  Intervention: Identify and Manage Fall Risk  Recent Flowsheet Documentation  Taken 1/19/2024 0343 by Anita Durham LPN  Safety Promotion/Fall Prevention: safety round/check completed  Taken 1/19/2024 0128 by Anita Durham LPN  Safety Promotion/Fall Prevention: safety round/check completed  Taken 1/18/2024 2342 by Anita Durham LPN  Safety Promotion/Fall Prevention: safety round/check completed  Taken 1/18/2024 2140 by Anita Durham LPN  Safety Promotion/Fall Prevention: safety round/check completed  Taken 1/18/2024 1941 by Anita Durham LPN  Safety Promotion/Fall Prevention: safety round/check completed  Intervention: Prevent Skin Injury  Recent Flowsheet Documentation  Taken 1/18/2024 2342 by Anita Durham LPN  Skin Protection:   adhesive use limited   incontinence pads utilized   skin sealant/moisture barrier applied  Intervention: Prevent and Manage VTE (Venous Thromboembolism) Risk  Recent Flowsheet Documentation  Taken 1/18/2024 2342 by Anita Durham LPN  VTE Prevention/Management: (Heparin subq) other (see comments)  Range of Motion: active ROM (range of motion) encouraged  Intervention: Prevent Infection  Recent Flowsheet Documentation  Taken 1/18/2024 2342 by Anita Durham LPN  Infection Prevention:   equipment surfaces disinfected   hand hygiene promoted   personal protective equipment utilized   single patient room provided   visitors restricted/screened  Goal: Optimal Comfort and Wellbeing  Outcome: Ongoing, Progressing  Intervention: Monitor Pain and Promote Comfort  Recent Flowsheet Documentation  Taken 1/18/2024 2342 by Anita Durham  LPN  Pain Management Interventions: see MAR  Intervention: Provide Person-Centered Care  Recent Flowsheet Documentation  Taken 1/18/2024 2342 by Anita Durham LPN  Trust Relationship/Rapport:   care explained   choices provided   questions answered   reassurance provided   thoughts/feelings acknowledged  Goal: Readiness for Transition of Care  Outcome: Ongoing, Progressing   Goal Outcome Evaluation:  Plan of Care Reviewed With: patient        Progress: no change     Vitals stable. Complaint of pain treated per eMAR. Able to make needs known. Will continue plan of care.

## 2024-01-19 NOTE — PLAN OF CARE
Goal Outcome Evaluation:  Plan of Care Reviewed With: patient        Progress: no change  Outcome Evaluation: Resident alert, oriented, able to make needs known to staff. remains bedbound d/t severe pain to BLLE. c/o chest is burning with inhalation and cough. CXR today, negative. Medicated with prn zofran x1, effective. Medicated with prn percocet x1, effective. Medicated with prn baclofen x1, effective, medicated with prn ibuprfen x1, effective. not much of an appetite throughout the day, Blood glucose stable for BF and lunch, started out low at dinner time, 69, 67, 68 and 123. ate most of his dinner and snacks. am temp 100.2, evening 99.4. Resident irritable, does not feel good, but cooperative. continue plan of care.

## 2024-01-20 LAB
GLUCOSE BLDC GLUCOMTR-MCNC: 113 MG/DL (ref 70–99)
GLUCOSE BLDC GLUCOMTR-MCNC: 137 MG/DL (ref 70–99)
GLUCOSE BLDC GLUCOMTR-MCNC: 156 MG/DL (ref 70–99)
GLUCOSE BLDC GLUCOMTR-MCNC: 215 MG/DL (ref 70–99)
GLUCOSE BLDC GLUCOMTR-MCNC: 69 MG/DL (ref 70–99)
GLUCOSE BLDC GLUCOMTR-MCNC: 70 MG/DL (ref 70–99)

## 2024-01-20 PROCEDURE — 63710000001 INSULIN LISPRO (HUMAN) PER 5 UNITS: Performed by: INTERNAL MEDICINE

## 2024-01-20 PROCEDURE — 82948 REAGENT STRIP/BLOOD GLUCOSE: CPT | Performed by: INTERNAL MEDICINE

## 2024-01-20 PROCEDURE — 63710000001 ONDANSETRON ODT 4 MG TABLET DISPERSIBLE: Performed by: INTERNAL MEDICINE

## 2024-01-20 PROCEDURE — 94799 UNLISTED PULMONARY SVC/PX: CPT

## 2024-01-20 PROCEDURE — 82948 REAGENT STRIP/BLOOD GLUCOSE: CPT

## 2024-01-20 PROCEDURE — 63710000001 INSULIN DETEMIR PER 5 UNITS: Performed by: PHYSICIAN ASSISTANT

## 2024-01-20 RX ORDER — FUROSEMIDE 20 MG/1
20 TABLET ORAL ONCE
Status: COMPLETED | OUTPATIENT
Start: 2024-01-20 | End: 2024-01-20

## 2024-01-20 RX ADMIN — BACLOFEN 10 MG: 10 TABLET ORAL at 03:15

## 2024-01-20 RX ADMIN — OSELTAMIVIR PHOSPHATE 75 MG: 75 CAPSULE ORAL at 10:00

## 2024-01-20 RX ADMIN — PREGABALIN 100 MG: 100 CAPSULE ORAL at 10:00

## 2024-01-20 RX ADMIN — PREGABALIN 100 MG: 100 CAPSULE ORAL at 21:25

## 2024-01-20 RX ADMIN — OSELTAMIVIR PHOSPHATE 75 MG: 75 CAPSULE ORAL at 21:26

## 2024-01-20 RX ADMIN — INSULIN LISPRO 4 UNITS: 100 INJECTION, SOLUTION INTRAVENOUS; SUBCUTANEOUS at 17:59

## 2024-01-20 RX ADMIN — FERROUS SULFATE TAB 325 MG (65 MG ELEMENTAL FE) 325 MG: 325 (65 FE) TAB at 10:00

## 2024-01-20 RX ADMIN — IBUPROFEN 400 MG: 400 TABLET ORAL at 01:20

## 2024-01-20 RX ADMIN — Medication 500 MG: at 10:00

## 2024-01-20 RX ADMIN — BACLOFEN 10 MG: 10 TABLET ORAL at 15:29

## 2024-01-20 RX ADMIN — INSULIN DETEMIR 50 UNITS: 100 INJECTION, SOLUTION SUBCUTANEOUS at 21:24

## 2024-01-20 RX ADMIN — INSULIN LISPRO 8 UNITS: 100 INJECTION, SOLUTION INTRAVENOUS; SUBCUTANEOUS at 21:35

## 2024-01-20 RX ADMIN — APIXABAN 5 MG: 5 TABLET, FILM COATED ORAL at 21:25

## 2024-01-20 RX ADMIN — EMPAGLIFLOZIN 10 MG: 10 TABLET, FILM COATED ORAL at 10:00

## 2024-01-20 RX ADMIN — APIXABAN 5 MG: 5 TABLET, FILM COATED ORAL at 10:00

## 2024-01-20 RX ADMIN — HYDROCORTISONE 1 APPLICATION: 10 CREAM TOPICAL at 10:01

## 2024-01-20 RX ADMIN — CYANOCOBALAMIN TAB 500 MCG 1000 MCG: 500 TAB at 10:00

## 2024-01-20 RX ADMIN — SERTRALINE HYDROCHLORIDE 25 MG: 25 TABLET ORAL at 21:25

## 2024-01-20 RX ADMIN — OXYCODONE AND ACETAMINOPHEN 1 TABLET: 7.5; 325 TABLET ORAL at 03:15

## 2024-01-20 RX ADMIN — Medication 10 MG: at 21:25

## 2024-01-20 RX ADMIN — BISMUTH SUBSALICYLATE 262 MG: 262 TABLET, CHEWABLE ORAL at 21:26

## 2024-01-20 RX ADMIN — ATORVASTATIN CALCIUM 10 MG: 10 TABLET, FILM COATED ORAL at 21:24

## 2024-01-20 RX ADMIN — PREGABALIN 100 MG: 100 CAPSULE ORAL at 15:29

## 2024-01-20 RX ADMIN — Medication 500 MG: at 21:24

## 2024-01-20 RX ADMIN — LISINOPRIL 2.5 MG: 2.5 TABLET ORAL at 10:01

## 2024-01-20 RX ADMIN — OXYCODONE AND ACETAMINOPHEN 1 TABLET: 7.5; 325 TABLET ORAL at 15:04

## 2024-01-20 RX ADMIN — INSULIN DETEMIR 50 UNITS: 100 INJECTION, SOLUTION SUBCUTANEOUS at 11:31

## 2024-01-20 RX ADMIN — HYDROCORTISONE 1 APPLICATION: 10 CREAM TOPICAL at 21:24

## 2024-01-20 RX ADMIN — FUROSEMIDE 20 MG: 20 TABLET ORAL at 11:30

## 2024-01-20 RX ADMIN — OXYCODONE AND ACETAMINOPHEN 1 TABLET: 7.5; 325 TABLET ORAL at 21:25

## 2024-01-20 RX ADMIN — ONDANSETRON 4 MG: 4 TABLET, ORALLY DISINTEGRATING ORAL at 13:53

## 2024-01-20 NOTE — PLAN OF CARE
Goal Outcome Evaluation:              Outcome Evaluation: POC continues at this time, call light in reach. Patient is able to make needs known. Patient uses bedpan and urinal. Patient is in droplet isolation. See eMAR for medication administration details.

## 2024-01-21 LAB
ALBUMIN SERPL-MCNC: 2.8 G/DL (ref 3.5–5.2)
ANION GAP SERPL CALCULATED.3IONS-SCNC: 7.4 MMOL/L (ref 5–15)
BUN SERPL-MCNC: 15 MG/DL (ref 8–23)
BUN/CREAT SERPL: 30.6 (ref 7–25)
CALCIUM SPEC-SCNC: 8.2 MG/DL (ref 8.6–10.5)
CHLORIDE SERPL-SCNC: 106 MMOL/L (ref 98–107)
CO2 SERPL-SCNC: 24.6 MMOL/L (ref 22–29)
CREAT SERPL-MCNC: 0.49 MG/DL (ref 0.76–1.27)
DEPRECATED RDW RBC AUTO: 61.6 FL (ref 37–54)
EGFRCR SERPLBLD CKD-EPI 2021: 113.9 ML/MIN/1.73
ERYTHROCYTE [DISTWIDTH] IN BLOOD BY AUTOMATED COUNT: 19.9 % (ref 12.3–15.4)
GLUCOSE BLDC GLUCOMTR-MCNC: 100 MG/DL (ref 70–99)
GLUCOSE BLDC GLUCOMTR-MCNC: 101 MG/DL (ref 70–99)
GLUCOSE BLDC GLUCOMTR-MCNC: 70 MG/DL (ref 70–99)
GLUCOSE BLDC GLUCOMTR-MCNC: 90 MG/DL (ref 70–99)
GLUCOSE BLDC GLUCOMTR-MCNC: 94 MG/DL (ref 70–99)
GLUCOSE SERPL-MCNC: 86 MG/DL (ref 65–99)
HCT VFR BLD AUTO: 40.1 % (ref 37.5–51)
HGB BLD-MCNC: 12.6 G/DL (ref 13–17.7)
MCH RBC QN AUTO: 26.4 PG (ref 26.6–33)
MCHC RBC AUTO-ENTMCNC: 31.4 G/DL (ref 31.5–35.7)
MCV RBC AUTO: 83.9 FL (ref 79–97)
PHOSPHATE SERPL-MCNC: 4 MG/DL (ref 2.5–4.5)
PLATELET # BLD AUTO: 70 10*3/MM3 (ref 140–450)
POTASSIUM SERPL-SCNC: 3.9 MMOL/L (ref 3.5–5.2)
RBC # BLD AUTO: 4.78 10*6/MM3 (ref 4.14–5.8)
SODIUM SERPL-SCNC: 138 MMOL/L (ref 136–145)
WBC NRBC COR # BLD AUTO: 3.34 10*3/MM3 (ref 3.4–10.8)

## 2024-01-21 PROCEDURE — 63710000001 ONDANSETRON ODT 4 MG TABLET DISPERSIBLE: Performed by: INTERNAL MEDICINE

## 2024-01-21 PROCEDURE — 63710000001 INSULIN DETEMIR PER 5 UNITS: Performed by: PHYSICIAN ASSISTANT

## 2024-01-21 PROCEDURE — 82948 REAGENT STRIP/BLOOD GLUCOSE: CPT

## 2024-01-21 PROCEDURE — 80069 RENAL FUNCTION PANEL: CPT | Performed by: PHYSICIAN ASSISTANT

## 2024-01-21 PROCEDURE — 82948 REAGENT STRIP/BLOOD GLUCOSE: CPT | Performed by: INTERNAL MEDICINE

## 2024-01-21 PROCEDURE — 85027 COMPLETE CBC AUTOMATED: CPT | Performed by: PHYSICIAN ASSISTANT

## 2024-01-21 RX ORDER — FUROSEMIDE 20 MG/1
20 TABLET ORAL DAILY
Status: COMPLETED | OUTPATIENT
Start: 2024-01-21 | End: 2024-01-23

## 2024-01-21 RX ADMIN — APIXABAN 5 MG: 5 TABLET, FILM COATED ORAL at 08:11

## 2024-01-21 RX ADMIN — INSULIN DETEMIR 50 UNITS: 100 INJECTION, SOLUTION SUBCUTANEOUS at 21:09

## 2024-01-21 RX ADMIN — HYDROCORTISONE 1 APPLICATION: 10 CREAM TOPICAL at 08:12

## 2024-01-21 RX ADMIN — BACLOFEN 10 MG: 10 TABLET ORAL at 13:05

## 2024-01-21 RX ADMIN — PREGABALIN 100 MG: 100 CAPSULE ORAL at 15:01

## 2024-01-21 RX ADMIN — OXYCODONE AND ACETAMINOPHEN 1 TABLET: 7.5; 325 TABLET ORAL at 21:07

## 2024-01-21 RX ADMIN — OXYCODONE AND ACETAMINOPHEN 1 TABLET: 7.5; 325 TABLET ORAL at 11:42

## 2024-01-21 RX ADMIN — FUROSEMIDE 20 MG: 20 TABLET ORAL at 08:11

## 2024-01-21 RX ADMIN — CYANOCOBALAMIN TAB 500 MCG 1000 MCG: 500 TAB at 08:11

## 2024-01-21 RX ADMIN — HYDROCORTISONE 1 APPLICATION: 10 CREAM TOPICAL at 21:10

## 2024-01-21 RX ADMIN — PREGABALIN 100 MG: 100 CAPSULE ORAL at 08:11

## 2024-01-21 RX ADMIN — INSULIN DETEMIR 50 UNITS: 100 INJECTION, SOLUTION SUBCUTANEOUS at 08:12

## 2024-01-21 RX ADMIN — Medication 10 MG: at 21:09

## 2024-01-21 RX ADMIN — PREGABALIN 100 MG: 100 CAPSULE ORAL at 21:06

## 2024-01-21 RX ADMIN — ATORVASTATIN CALCIUM 10 MG: 10 TABLET, FILM COATED ORAL at 21:08

## 2024-01-21 RX ADMIN — SERTRALINE HYDROCHLORIDE 25 MG: 25 TABLET ORAL at 21:07

## 2024-01-21 RX ADMIN — FERROUS SULFATE TAB 325 MG (65 MG ELEMENTAL FE) 325 MG: 325 (65 FE) TAB at 08:11

## 2024-01-21 RX ADMIN — Medication 500 MG: at 21:08

## 2024-01-21 RX ADMIN — OSELTAMIVIR PHOSPHATE 75 MG: 75 CAPSULE ORAL at 21:09

## 2024-01-21 RX ADMIN — Medication 500 MG: at 08:11

## 2024-01-21 RX ADMIN — ONDANSETRON 4 MG: 4 TABLET, ORALLY DISINTEGRATING ORAL at 15:01

## 2024-01-21 RX ADMIN — APIXABAN 5 MG: 5 TABLET, FILM COATED ORAL at 21:08

## 2024-01-21 RX ADMIN — BACLOFEN 10 MG: 10 TABLET ORAL at 00:37

## 2024-01-21 RX ADMIN — OSELTAMIVIR PHOSPHATE 75 MG: 75 CAPSULE ORAL at 08:11

## 2024-01-21 RX ADMIN — EMPAGLIFLOZIN 10 MG: 10 TABLET, FILM COATED ORAL at 08:11

## 2024-01-21 RX ADMIN — IBUPROFEN 400 MG: 400 TABLET ORAL at 00:38

## 2024-01-21 RX ADMIN — LISINOPRIL 2.5 MG: 2.5 TABLET ORAL at 08:12

## 2024-01-21 NOTE — PLAN OF CARE
Goal Outcome Evaluation:  Plan of Care Reviewed With: patient        Progress: no change  Outcome Evaluation: No significant change. Continues droplet isolation for influenza. Vital signs stable.  Continues with baclofen, ibuprofen, and percocet for pain control for left hip and shoulder.

## 2024-01-22 LAB
GLUCOSE BLDC GLUCOMTR-MCNC: 100 MG/DL (ref 70–99)
GLUCOSE BLDC GLUCOMTR-MCNC: 107 MG/DL (ref 70–99)
GLUCOSE BLDC GLUCOMTR-MCNC: 129 MG/DL (ref 70–99)
GLUCOSE BLDC GLUCOMTR-MCNC: 157 MG/DL (ref 70–99)

## 2024-01-22 PROCEDURE — 82948 REAGENT STRIP/BLOOD GLUCOSE: CPT

## 2024-01-22 PROCEDURE — 63710000001 INSULIN LISPRO (HUMAN) PER 5 UNITS: Performed by: INTERNAL MEDICINE

## 2024-01-22 PROCEDURE — 63710000001 INSULIN DETEMIR PER 5 UNITS: Performed by: PHYSICIAN ASSISTANT

## 2024-01-22 PROCEDURE — 82948 REAGENT STRIP/BLOOD GLUCOSE: CPT | Performed by: INTERNAL MEDICINE

## 2024-01-22 PROCEDURE — 99308 SBSQ NF CARE LOW MDM 20: CPT | Performed by: PHYSICIAN ASSISTANT

## 2024-01-22 RX ADMIN — INSULIN DETEMIR 50 UNITS: 100 INJECTION, SOLUTION SUBCUTANEOUS at 08:38

## 2024-01-22 RX ADMIN — CYANOCOBALAMIN TAB 500 MCG 1000 MCG: 500 TAB at 08:38

## 2024-01-22 RX ADMIN — SERTRALINE HYDROCHLORIDE 25 MG: 25 TABLET ORAL at 21:34

## 2024-01-22 RX ADMIN — APIXABAN 5 MG: 5 TABLET, FILM COATED ORAL at 21:33

## 2024-01-22 RX ADMIN — HYDROCORTISONE 1 APPLICATION: 10 CREAM TOPICAL at 08:40

## 2024-01-22 RX ADMIN — IBUPROFEN 400 MG: 400 TABLET ORAL at 21:34

## 2024-01-22 RX ADMIN — PREGABALIN 100 MG: 100 CAPSULE ORAL at 08:38

## 2024-01-22 RX ADMIN — HYDROCORTISONE 1 APPLICATION: 10 CREAM TOPICAL at 21:35

## 2024-01-22 RX ADMIN — BACLOFEN 10 MG: 10 TABLET ORAL at 12:55

## 2024-01-22 RX ADMIN — FERROUS SULFATE TAB 325 MG (65 MG ELEMENTAL FE) 325 MG: 325 (65 FE) TAB at 08:38

## 2024-01-22 RX ADMIN — APIXABAN 5 MG: 5 TABLET, FILM COATED ORAL at 08:38

## 2024-01-22 RX ADMIN — ATORVASTATIN CALCIUM 10 MG: 10 TABLET, FILM COATED ORAL at 21:34

## 2024-01-22 RX ADMIN — FUROSEMIDE 20 MG: 20 TABLET ORAL at 08:38

## 2024-01-22 RX ADMIN — Medication 500 MG: at 23:13

## 2024-01-22 RX ADMIN — Medication 500 MG: at 08:38

## 2024-01-22 RX ADMIN — Medication 10 MG: at 21:33

## 2024-01-22 RX ADMIN — LISINOPRIL 2.5 MG: 2.5 TABLET ORAL at 08:38

## 2024-01-22 RX ADMIN — INSULIN LISPRO 4 UNITS: 100 INJECTION, SOLUTION INTRAVENOUS; SUBCUTANEOUS at 17:39

## 2024-01-22 RX ADMIN — PREGABALIN 100 MG: 100 CAPSULE ORAL at 16:59

## 2024-01-22 RX ADMIN — EMPAGLIFLOZIN 10 MG: 10 TABLET, FILM COATED ORAL at 08:38

## 2024-01-22 RX ADMIN — BACLOFEN 10 MG: 10 TABLET ORAL at 02:47

## 2024-01-22 RX ADMIN — INSULIN DETEMIR 50 UNITS: 100 INJECTION, SOLUTION SUBCUTANEOUS at 21:35

## 2024-01-22 RX ADMIN — OSELTAMIVIR PHOSPHATE 75 MG: 75 CAPSULE ORAL at 08:38

## 2024-01-22 RX ADMIN — OXYCODONE AND ACETAMINOPHEN 1 TABLET: 7.5; 325 TABLET ORAL at 17:39

## 2024-01-22 RX ADMIN — PREGABALIN 100 MG: 100 CAPSULE ORAL at 21:33

## 2024-01-22 RX ADMIN — BACLOFEN 10 MG: 10 TABLET ORAL at 21:33

## 2024-01-22 RX ADMIN — IBUPROFEN 400 MG: 400 TABLET ORAL at 02:47

## 2024-01-22 NOTE — PLAN OF CARE
Goal Outcome Evaluation:              Outcome Evaluation: POC continues. No significant changes this shift. Droplet Isolation continued. See eMAR for medication administration details. Patient is able to make needs known, call light in reach.

## 2024-01-22 NOTE — PROGRESS NOTES
[facility]   Hospitalist Progress Note  Date: 2024  Patient Name: Jose Shaikh  : 1960  MRN: [id number]  Date of admission: 2023      Subjective   Subjective     Chief Complaint: Weakness    Summary: Jose Shaikh is a 63 y.o. male  initially hospitalized on 9/10/2023, prolonged hospitalization for treatment of management of generalized weakness, deconditioning, with acute issues of diarrhea, C. difficile negative.  Diarrhea improved.  Had small hematoma after ambulating on his foot.  Unfortunately with exhaustive efforts to try to place this gentleman after 39 days of hospitalization, we are unable to find a facility that will accept him.  He has no further acute needs or requirements for inpatient monitoring and management, his labs and vitals are stable, he is tolerating oral intake, and has been refusing turns and repositionings and other interventions with nursing staff despite recommendations.  Patient discharged in hemodynamically stable condition on 10/19/2023 to follow-up with PCP within 1 week. Unfortunately we cannot solve all of his social issues during this hospitalization due to lack of resources and participation from the patient's perspective despite all our efforts.  Patient has a financial means of making arrangements at home.  Patient appealed his discharge.  Lost his appeal.  LCD: 10/20/23, PFL beginning: 10/21/23 @ 12pm.  Due to an inability to place the patient in a.m. nursing home he has been placed in our skilled nursing facility until arrangements can be made or until he is able to care for himself.      Interval Followup: Patient seen and examined resting comfortably flu symptoms improving continuing Tamiflu.  Volume status improving continue Lasix    Review of systems: All systems reviewed and negative except what is noted above in interval follow-up   Objective   Objective     Vitals:   Temp:  [97.7 °F (36.5 °C)-97.9 °F (36.6 °C)] 97.7 °F (36.5 °C)  Heart Rate:   [71-72] 72  Resp:  [18] 18  BP: (107-114)/(54-63) 114/54    Physical Exam   Constitutional: Awake alert oriented no acute distress  Respiratory: No wheeze  GI: Abdomen: Obese  Neuro/psych:  Calm mood.  No dysarthria.  Extremities: Some lower extremity edema noted    Result Review    Result Review:  I have personally reviewed the results from the time of this admission to 1/22/2024 15:24 EST and agree with these findings:  [x]  Laboratory  [x]  Microbiology  []  Radiology  []  EKG/Telemetry   []  Cardiology/Vascular   []  Pathology  []  Old records  []  Other:    Assessment & Plan   Assessment / Plan   Assessment:  Hospital-acquired weakness  Influenza A  C. difficile diarrhea treated  Generalized weakness  Deconditioning  DM (Kami Garcia and PCP)  HTN  PAF (on Eliquis; previously seen Dr. Duval)  Morbid obesity with BMI 44  Debility with wheelchair dependence  Hx of severe left hip pain  Severe degenerative joint disease of lower extremities  Hx L calcaneus osteomyelitis  Hx R transmetatarsal  Chronic bilateral foot wounds with right transmetatarsal amputation, healing by secondary intention (wound care clinic; podiatrist Gordon)  Thrombocytopenia, resolved      Plan:    Complete course of Tamiflu  Continue Lasix for 3 days then stop  A.m. chemistries  Continue basal insulin and SSI per protocol titrate as needed  Continue Eliquis for stroke prophylaxis  Continue daily PT and OT  Continue current pain meds   Had C. difficile diarrhea few weeks ago completed course of oral vancomycin  Continue probiotics  Monitor hemodynamics and avoid hypotension/dehydration.  Continue other treatment as ordered  Discussed with RN    DVT prophylaxis:  Medical DVT prophylaxis orders are present.    CODE STATUS:   Code Status (Patient has no pulse and is not breathing): CPR (Attempt to Resuscitate)  Medical Interventions (Patient has pulse or is breathing): Full Support

## 2024-01-22 NOTE — PLAN OF CARE
Goal Outcome Evaluation:  Plan of Care Reviewed With: patient           Outcome Evaluation: No significant changes this shift. Pt continues to refuse turns. He was medicated for pain and muscle spasms and had some relief. Will continue with his plan of care. Call light and personal items within reach.

## 2024-01-23 LAB
ALBUMIN SERPL-MCNC: 3 G/DL (ref 3.5–5.2)
ANION GAP SERPL CALCULATED.3IONS-SCNC: 6.7 MMOL/L (ref 5–15)
BUN SERPL-MCNC: 15 MG/DL (ref 8–23)
BUN/CREAT SERPL: 24.2 (ref 7–25)
CALCIUM SPEC-SCNC: 8.5 MG/DL (ref 8.6–10.5)
CHLORIDE SERPL-SCNC: 106 MMOL/L (ref 98–107)
CO2 SERPL-SCNC: 26.3 MMOL/L (ref 22–29)
CREAT SERPL-MCNC: 0.62 MG/DL (ref 0.76–1.27)
EGFRCR SERPLBLD CKD-EPI 2021: 106.1 ML/MIN/1.73
GLUCOSE BLDC GLUCOMTR-MCNC: 118 MG/DL (ref 70–99)
GLUCOSE BLDC GLUCOMTR-MCNC: 144 MG/DL (ref 70–99)
GLUCOSE BLDC GLUCOMTR-MCNC: 202 MG/DL (ref 70–99)
GLUCOSE BLDC GLUCOMTR-MCNC: 92 MG/DL (ref 70–99)
GLUCOSE SERPL-MCNC: 110 MG/DL (ref 65–99)
PHOSPHATE SERPL-MCNC: 3.7 MG/DL (ref 2.5–4.5)
POTASSIUM SERPL-SCNC: 3.8 MMOL/L (ref 3.5–5.2)
SODIUM SERPL-SCNC: 139 MMOL/L (ref 136–145)

## 2024-01-23 PROCEDURE — 82948 REAGENT STRIP/BLOOD GLUCOSE: CPT | Performed by: INTERNAL MEDICINE

## 2024-01-23 PROCEDURE — 80069 RENAL FUNCTION PANEL: CPT | Performed by: PHYSICIAN ASSISTANT

## 2024-01-23 PROCEDURE — 63710000001 INSULIN LISPRO (HUMAN) PER 5 UNITS: Performed by: INTERNAL MEDICINE

## 2024-01-23 PROCEDURE — 63710000001 INSULIN DETEMIR PER 5 UNITS: Performed by: PHYSICIAN ASSISTANT

## 2024-01-23 PROCEDURE — 82948 REAGENT STRIP/BLOOD GLUCOSE: CPT

## 2024-01-23 PROCEDURE — 99308 SBSQ NF CARE LOW MDM 20: CPT | Performed by: PHYSICIAN ASSISTANT

## 2024-01-23 RX ORDER — BACLOFEN 10 MG/1
10 TABLET ORAL 3 TIMES DAILY PRN
Status: DISPENSED | OUTPATIENT
Start: 2024-01-23 | End: 2024-02-06

## 2024-01-23 RX ADMIN — INSULIN DETEMIR 50 UNITS: 100 INJECTION, SOLUTION SUBCUTANEOUS at 22:04

## 2024-01-23 RX ADMIN — Medication 500 MG: at 08:43

## 2024-01-23 RX ADMIN — OXYCODONE AND ACETAMINOPHEN 1 TABLET: 7.5; 325 TABLET ORAL at 10:01

## 2024-01-23 RX ADMIN — OXYCODONE AND ACETAMINOPHEN 1 TABLET: 7.5; 325 TABLET ORAL at 01:08

## 2024-01-23 RX ADMIN — BISMUTH SUBSALICYLATE 262 MG: 262 TABLET, CHEWABLE ORAL at 10:01

## 2024-01-23 RX ADMIN — PREGABALIN 100 MG: 100 CAPSULE ORAL at 22:02

## 2024-01-23 RX ADMIN — APIXABAN 5 MG: 5 TABLET, FILM COATED ORAL at 22:04

## 2024-01-23 RX ADMIN — INSULIN DETEMIR 50 UNITS: 100 INJECTION, SOLUTION SUBCUTANEOUS at 08:43

## 2024-01-23 RX ADMIN — IBUPROFEN 400 MG: 400 TABLET ORAL at 12:10

## 2024-01-23 RX ADMIN — CYANOCOBALAMIN TAB 500 MCG 1000 MCG: 500 TAB at 08:43

## 2024-01-23 RX ADMIN — OSELTAMIVIR PHOSPHATE 75 MG: 75 CAPSULE ORAL at 12:10

## 2024-01-23 RX ADMIN — PREGABALIN 100 MG: 100 CAPSULE ORAL at 16:26

## 2024-01-23 RX ADMIN — SERTRALINE HYDROCHLORIDE 25 MG: 25 TABLET ORAL at 22:02

## 2024-01-23 RX ADMIN — EMPAGLIFLOZIN 10 MG: 10 TABLET, FILM COATED ORAL at 08:44

## 2024-01-23 RX ADMIN — LISINOPRIL 2.5 MG: 2.5 TABLET ORAL at 08:43

## 2024-01-23 RX ADMIN — Medication 10 MG: at 22:03

## 2024-01-23 RX ADMIN — OXYCODONE AND ACETAMINOPHEN 1 TABLET: 7.5; 325 TABLET ORAL at 22:24

## 2024-01-23 RX ADMIN — BENZONATATE 100 MG: 100 CAPSULE ORAL at 01:17

## 2024-01-23 RX ADMIN — HYDROCORTISONE 1 APPLICATION: 10 CREAM TOPICAL at 08:44

## 2024-01-23 RX ADMIN — HYDROCORTISONE 1 APPLICATION: 10 CREAM TOPICAL at 22:05

## 2024-01-23 RX ADMIN — FERROUS SULFATE TAB 325 MG (65 MG ELEMENTAL FE) 325 MG: 325 (65 FE) TAB at 08:43

## 2024-01-23 RX ADMIN — INSULIN LISPRO 8 UNITS: 100 INJECTION, SOLUTION INTRAVENOUS; SUBCUTANEOUS at 22:05

## 2024-01-23 RX ADMIN — PREGABALIN 100 MG: 100 CAPSULE ORAL at 08:43

## 2024-01-23 RX ADMIN — FUROSEMIDE 20 MG: 20 TABLET ORAL at 08:43

## 2024-01-23 RX ADMIN — ATORVASTATIN CALCIUM 10 MG: 10 TABLET, FILM COATED ORAL at 22:03

## 2024-01-23 RX ADMIN — Medication 500 MG: at 22:02

## 2024-01-23 RX ADMIN — APIXABAN 5 MG: 5 TABLET, FILM COATED ORAL at 08:44

## 2024-01-23 NOTE — SIGNIFICANT NOTE
Wound Eval / Progress Noted     Angelica     Patient Name: Jose Shaikh  : 1960  MRN: [id number]  Today's Date: 2024                 Admit Date: 2023    Visit Dx:    ICD-10-CM ICD-9-CM   1. Difficulty in walking  R26.2 719.7         Debility        Past Medical History:   Diagnosis Date    Absence of toe of right foot     Acute osteomyelitis of left calcaneus  2021    Anxiety and depression     Arthritis     Cancer     Chronic pain     STATES HIS PAIN IS 10/10 AAT    Claustrophobia     Corns and callus     Diabetic ulcer of left heel associated with type 2 DM 2021    Diabetic ulcer of left heel associated with type 2 DM 2021    Diabetic ulcer of right midfoot associated with type 2 DM 2021    Difficulty walking     Essential hypertension 2021    Hammertoe     Hyperlipidemia LDL goal <100 2021    Ingrown toenail     Obesity     Paroxysmal atrial fibrillation 2021    Polyneuropathy     Pressure ulcer, stage 1     Tinea unguium     Type 2 diabetes mellitus with polyneuropathy         Past Surgical History:   Procedure Laterality Date    CYST REMOVAL      center of back; benign    EYE SURGERY      INCISION AND DRAINAGE ABSCESS      back    INCISION AND DRAINAGE LEG Right 12/10/2021    Procedure: INCISION AND DRAINAGE LOWER EXTREMITY;  Surgeon: Ash Leyva DPM;  Location: [facility];  Service: Podiatry;  Laterality: Right;    OTHER SURGICAL HISTORY      Surgical clips left foot    TOE SURGERY Right     Removal of 5th toe    TRANS METATARSAL AMPUTATION Right 2021    Procedure: AMPUTATION TRANS METATARSAL;  Surgeon: Ash Leyva DPM;  Location: [facility];  Service: Podiatry;  Laterality: Right;    VASCULAR SURGERY      WRIST SURGERY Left     repair of injury         Physical Assessment:  Wound 24 1343 Left anterior fourth toe Abrasion (Active)   Dressing Appearance open to air 24 1239   Closure None 24  1239   Base dry;red 01/23/24 1239   Periwound dry;intact 01/23/24 1239   Periwound Temperature warm 01/23/24 1239   Periwound Skin Turgor soft 01/23/24 1239   Edges open 01/23/24 1239   Drainage Amount none 01/23/24 1239   Care, Wound cleansed with;sterile normal saline 01/23/24 1239   Dressing Care dressing applied;petroleum-based;non-adherent;gauze;other (see comments) 01/23/24 1239   Periwound Care absorptive dressing applied 01/23/24 1239       Wound 01/22/24 1344 Right anterior foot Abrasion (Active)   Dressing Appearance open to air 01/23/24 1239   Closure None 01/23/24 1239   Base dry;red 01/23/24 1239   Periwound dry;intact 01/23/24 1239   Periwound Temperature warm 01/23/24 1239   Periwound Skin Turgor soft 01/23/24 1239   Edges open 01/23/24 1239   Drainage Amount none 01/23/24 1239   Care, Wound cleansed with;sterile normal saline 01/23/24 1239   Dressing Care dressing applied;petroleum-based;non-adherent;gauze;silicone;border dressing 01/23/24 1239   Periwound Care absorptive dressing applied 01/23/24 1239           Wound Check / Follow-up: Patient seen today for wound consult. Patient remains on SNF. He is awake and alert at time of visit. Patient reports he has been incontinent of stool prior to my arrival. Primary PCA at bedside assisted with incontinence care and absorbent pad was changed.    Patient with new, likely traumatic, injuries to right distal foot and left anterior fourth toe. Both areas with dry, red tissue noted. Cleansed with normal saline and gauze. Recommending daily dressing changes with non-adherent petroleum based gauze and silicone border dressing.    Buttocks intact and reddened but blanchable. No open tissue noted. Recommending skin care with application of barrier cream.     Staff is reporting patient continuing to refuse turning / repositioning. Discussed with patient the importance of turning and offloading. He verbalized understanding.    Hospitalist PA to order podiatry  consult for long, thickened toenails.      Impression: Traumatic injuries to bilateral feet.      Short term goals: Regain skin integrity, skin protection, pressure reduction, moisture prevention, daily dressing changes, skin care.      Dottie Guevara RN    1/23/2024    16:41 EST

## 2024-01-23 NOTE — PROGRESS NOTES
[facility]   Hospitalist Progress Note  Date: 2024  Patient Name: Jose Shaikh  : 1960  MRN: [id number]  Date of admission: 2023      Subjective   Subjective     Chief Complaint: Weakness    Summary: Jose Shaikh is a 63 y.o. male  initially hospitalized on 9/10/2023, prolonged hospitalization for treatment of management of generalized weakness, deconditioning, with acute issues of diarrhea, C. difficile negative.  Diarrhea improved.  Had small hematoma after ambulating on his foot.  Unfortunately with exhaustive efforts to try to place this gentleman after 39 days of hospitalization, we are unable to find a facility that will accept him.  He has no further acute needs or requirements for inpatient monitoring and management, his labs and vitals are stable, he is tolerating oral intake, and has been refusing turns and repositionings and other interventions with nursing staff despite recommendations.  Patient discharged in hemodynamically stable condition on 10/19/2023 to follow-up with PCP within 1 week. Unfortunately we cannot solve all of his social issues during this hospitalization due to lack of resources and participation from the patient's perspective despite all our efforts.  Patient has a financial means of making arrangements at home.  Patient appealed his discharge.  Lost his appeal.  LCD: 10/20/23, PFL beginning: 10/21/23 @ 12pm.  Due to an inability to place the patient in a.m. nursing home he has been placed in our skilled nursing facility until arrangements can be made or until he is able to care for himself.      Interval Followup: Patient seen and examined resting comfortably, volume status improved completed 3 days of Lasix can continue spot dose as needed consulted podiatry for toenails.     Review of systems: All systems reviewed and negative except what is noted above in interval follow-up   Objective   Objective     Vitals:   Temp:  [97.5 °F (36.4 °C)-97.9 °F (36.6 °C)]  97.5 °F (36.4 °C)  Heart Rate:  [73-74] 74  Resp:  [18] 18  BP: (123-127)/(62-68) 127/68    Physical Exam   Constitutional: Awake alert oriented no acute distress  Respiratory: No wheeze  GI: Abdomen: Obese  Neuro/psych:  Calm mood.  No dysarthria.  Extremities: Some lower extremity edema noted    Result Review    Result Review:  I have personally reviewed the results from the time of this admission to 1/23/2024 14:12 EST and agree with these findings:  [x]  Laboratory  [x]  Microbiology  []  Radiology  []  EKG/Telemetry   []  Cardiology/Vascular   []  Pathology  []  Old records  []  Other:    Assessment & Plan   Assessment / Plan   Assessment:  Hospital-acquired weakness  Influenza A  C. difficile diarrhea treated  Generalized weakness  Deconditioning  DM (Kami Garcia and PCP)  HTN  PAF (on Eliquis; previously seen Dr. Duval)  Morbid obesity with BMI 44  Debility with wheelchair dependence  Hx of severe left hip pain  Severe degenerative joint disease of lower extremities  Hx L calcaneus osteomyelitis  Hx R transmetatarsal  Chronic bilateral foot wounds with right transmetatarsal amputation, healing by secondary intention (wound care clinic; podiatrist Gordon)  Thrombocytopenia, resolved      Plan:    Complete course of Tamiflu  Continue Lasix for 3 days then stop can spot dose as needed  Podiatry consultation for toenails.   A.m. chemistries  Continue basal insulin and SSI per protocol titrate as needed  Continue Eliquis for stroke prophylaxis  Continue daily PT and OT  Continue current pain meds   Had C. difficile diarrhea few weeks ago completed course of oral vancomycin  Continue probiotics  Monitor hemodynamics and avoid hypotension/dehydration.  Continue other treatment as ordered  Discussed with RN    DVT prophylaxis:  Medical DVT prophylaxis orders are present.    CODE STATUS:   Code Status (Patient has no pulse and is not breathing): CPR (Attempt to Resuscitate)  Medical Interventions (Patient has pulse  or is breathing): Full Support

## 2024-01-23 NOTE — PLAN OF CARE
Goal Outcome Evaluation:  Plan of Care Reviewed With: patient        Progress: improving  Outcome Evaluation: No significant changes this shift. Pt AO x 4 and VSS. He has been medicated for pain, muscle spasms, sleep and for cough. Blood glucose was 107 and pt had several snacks and had coverage with his scheduled insulin. Will continue with his plan of care. Call light and personal items within reach.

## 2024-01-23 NOTE — PLAN OF CARE
Goal Outcome Evaluation:  Plan of Care Reviewed With: patient        Progress: improving  Outcome Evaluation: Resident alert, oriented, able to make needs known to staff. Remains bedbound. refuses to turn. wound nurse here to evaluate areas to rt foot and 4th toe, lt foot. new treatment orders received. podiatry consult received. Blood glucose stable for all 3 meals. Door dashed snack foods this afternoon. 2 episodes of bowel incontinence today. . Medicated with prn pepto bismol today, effective thru most off the day until dinnertime. Medicated for prn pain x2, Percocet x1, Motrin x1. effective. Resident conversant and pleasant with staff.

## 2024-01-23 NOTE — PROGRESS NOTES
"Nursing Facility Medication Regimen Review    Potentially Clinically Significant Medication Issues during this review: [x]Identified   []Not identified    Provider acknowledgement required?   [x]Yes   []No    Jose Shaikh is a 63 y.o.male admitted by Cailin Acosta MD , on 11/6/2023  1:34 PM , for Debility [R53.81]    Visit Vitals  /68 (BP Location: Left arm, Patient Position: Lying)   Pulse 74   Temp 97.5 °F (36.4 °C) (Oral)   Resp 18   Ht 188 cm (74.02\")   Wt (!) 168 kg (369 lb 14.9 oz)   SpO2 97%   BMI 47.48 kg/m²        Lab Results   Component Value Date    GLUCOSE 110 (H) 01/23/2024    BUN 15 01/23/2024    CREATININE 0.62 (L) 01/23/2024    EGFRIFNONA 134 12/20/2021    BCR 24.2 01/23/2024    K 3.8 01/23/2024    CO2 26.3 01/23/2024    CALCIUM 8.5 (L) 01/23/2024    ALBUMIN 3.0 (L) 01/23/2024    LABIL2 1.3 (L) 08/07/2019    AST 35 11/16/2023    ALT 32 11/16/2023    WBC 3.34 (L) 01/21/2024    HGB 12.6 (L) 01/21/2024    HCT 40.1 01/21/2024    MCV 83.9 01/21/2024    PLT 70 (L) 01/21/2024        Active Ambulatory Problems     Diagnosis Date Noted    Diabetic ulcer of left heel associated with type 2 DM 07/06/2021    Acute osteomyelitis of left calcaneus  08/18/2021    Diabetic ulcer of left heel associated with type 2 DM 08/18/2021    Diabetic ulcer of right midfoot associated with type 2 DM 08/18/2021    Paroxysmal atrial fibrillation 08/31/2021    Essential hypertension 08/31/2021    Hyperlipidemia LDL goal <100 08/31/2021    Cellulitis and abscess of foot 12/01/2021    High alkaline phosphatase level 12/19/2021    Osteomyelitis 10/01/2022    Onychomycosis 10/02/2022    Onychocryptosis 10/02/2022    Foot pain, bilateral 10/02/2022    Osteomyelitis of foot, right, acute 10/05/2022    Cellulitis of right foot 10/05/2022    Type 2 diabetes mellitus, with long-term current use of insulin 11/08/2022    Class 3 severe obesity due to excess calories with serious comorbidity and body mass index (BMI) of 45.0 to " 49.9 in adult 11/08/2022    Anxiety disorder, unspecified 10/07/2022    Claustrophobia 05/02/2022    Dependence on wheelchair 10/27/2022    Depression, unspecified 05/02/2022    Long term (current) use of anticoagulants 05/02/2022    Long term (current) use of oral hypoglycemic drugs 05/02/2022    Wound of foot 05/02/2022    Non-prs chronic ulcer oth prt r foot limited to brkdwn skin 05/02/2022    Orthostatic hypotension 10/07/2022    Other chronic osteomyelitis, right ankle and foot 10/07/2022    Personal history of nicotine dependence 05/02/2022    Thrombocytopenia, unspecified 10/07/2022    Unspecified open wound, right foot, initial encounter 04/03/2023    Diabetic foot infection 04/03/2023    Subacute osteomyelitis of right foot 04/06/2023    Right foot pain 05/12/2023    Sepsis 05/12/2023    Onychomycosis 05/22/2023    Foot pain, left 05/22/2023    Impaired mobility and ADLs 06/16/2023    Absence of toe of right foot 07/11/2023    Corns and callosity 07/11/2023    Disability of walking 07/11/2023    Fracture 07/11/2023    Limb swelling 07/11/2023    Polyneuropathy 07/11/2023    Pressure ulcer, stage 1 07/11/2023    Shortness of breath 07/11/2023    Generalized weakness 09/10/2023     Resolved Ambulatory Problems     Diagnosis Date Noted    Lactic acidosis 12/01/2021    Abscess of back 12/20/2021     Past Medical History:   Diagnosis Date    Anxiety and depression     Arthritis     Cancer     Chronic pain     Corns and callus     Difficulty walking     Hammertoe     Ingrown toenail     Obesity     Tinea unguium     Type 2 diabetes mellitus with polyneuropathy         Hospital Medications (active)         Dose Frequency Start End Indication    acetaminophen (TYLENOL) tablet 650 mg 650 mg Every 4 Hours PRN 11/6/2023 -- PRN Mild, moderate pain, headache, fever    Admin Instructions: Based on patient request - if ordered for moderate or severe pain, provider allows for administration of a medication prescribed for  a lower pain scale.    Do not exceed 4 grams of acetaminophen in a 24 hr period. Max dose of 2gm for AST/ALT greater than 120 units/L.    If given for pain, use the following pain scale:   Mild Pain = Pain Score of 1-3, CPOT 1-2  Moderate Pain = Pain Score of 4-6, CPOT 3-4  Severe Pain = Pain Score of 7-10, CPOT 5-8     Route: Oral     apixaban (ELIQUIS) tablet 5 mg 5 mg Every 12 Hours Scheduled 11/6/2023 -- Atrial Fibrillation     Admin Instructions: Tablet may be crushed and suspended in 60 mL of water or D5W and immediately delivered via NG tube.     Route: Oral     atorvastatin (LIPITOR) tablet 10 mg 10 mg Nightly 11/6/2023 -- Hyperlipidemia    Admin Instructions: Avoid grapefruit juice.     Route: Oral     baclofen (LIORESAL) tablet 10 mg 10 mg 3 Times Daily PRN 11/6/2023 -- PRN muscle spasms     Admin Instructions: Take with food if GI upset occurs.     Route: Oral     benzonatate (TESSALON) capsule 100 mg 100 mg 3 Times Daily PRN 1/18/2024 -- PRN for cough    Admin Instructions: Do not crush or chew the capsules or tablets. The drug may not work as designed if the capsule or tablet is crushed or chewed. Swallow whole.  Swallow whole.  Do not crush, chew, or open capsule.     Route: Oral     bismuth subsalicylate (PEPTO BISMOL) chewable tablet 262 mg 262 mg Daily PRN 1/6/2024 -- PRN for indigestion     Admin Instructions: Maximum 4192 mg per 24 hours.     Route: Oral     Cosign for Ordering: Accepted by Jose Maya MD on 1/6/2024  2:16 PM     dextrose (D50W) (25 g/50 mL) IV injection 25 g 25 g Every 15 Minutes PRN 11/6/2023 -- PRN hypoglycemia     Admin Instructions: Blood sugar less than 70; patient has IV access - Unresponsive, NPO or Unable To Safely Swallow     Route: Intravenous     dextrose (GLUTOSE) oral gel 15 g 15 g Every 15 Minutes PRN 11/6/2023 -- PRN hypoglycemia    Admin Instructions: BS<70, Patient Alert, Is not NPO, Can safely swallow.     Route: Oral     empagliflozin (JARDIANCE) tablet 10  mg 10 mg Daily 11/7/2023 -- Type 2 diabetes     Route: Oral     ferrous sulfate tablet 325 mg 325 mg Daily With Breakfast 11/21/2023 -- Anemia     Admin Instructions: Swallow whole. Do not crush, split, or chew. Take with food if GI upset occurs.     Route: Oral     Cosign for Ordering: Accepted by Max Mehta MD on 11/21/2023 11:34 AM     glucagon (GLUCAGEN) injection 1 mg 1 mg Every 15 Minutes PRN 11/6/2023 -- PRN hypoglycemia     Admin Instructions: Blood Glucose Less Than 70 - Patient Without IV Access - Unresponsive, NPO or Unable To Safely Swallow  Reconstitute powder for injection by adding 1 mL of -supplied sterile diluent or sterile water for injection to a vial containing 1 mg of the drug, to provide solutions containing 1 mg/mL. Shake vial gently to dissolve.     Route: Intramuscular     hydrocortisone 1 % cream 1 application  1 application  Every 12 Hours Scheduled 1/13/2024 -- Hemorrhoids discomfort     Admin Instructions: Apply re: hemorrhoids discomfort.     Route: Topical     Cosign for Ordering: Accepted by Max Mehta MD on 1/15/2024  1:51 PM     ibuprofen (ADVIL,MOTRIN) tablet 400 mg 400 mg 2 Times Daily PRN 1/16/2024 -- PRN mild pain     Admin Instructions: Based on patient request - if ordered for moderate or severe pain, provider allows for administration of a medication prescribed for a lower pain scale.    Mucous membrane irritant. Do not crush or chew tablet or capsule unless administered through a feeding tube.  If given for pain, use the following pain scale:  Mild Pain = Pain Score of 1-3, CPOT 1-2  Moderate Pain = Pain Score of 4-6, CPOT 3-4  Severe Pain = Pain Score of 7-10, CPOT 5-8     Route: Oral     Cosign for Ordering: Accepted by Max Mehta MD on 1/17/2024 10:44 AM     insulin detemir (LEVEMIR) injection 50 Units 50 Units Daily 1/20/2024 -- Type 2 Diabetes     Admin Instructions: Do not hold basal insulin without an order. Consider  "requesting a dose edit, if needed.        Route: Subcutaneous     Cosign for Ordering: Accepted by Jose Maya MD on 1/20/2024 12:28 PM     insulin detemir (LEVEMIR) injection 50 Units 50 Units Nightly 1/20/2024 -- Type 2 Diabetes     Admin Instructions: Do not hold basal insulin without an order. Consider requesting a dose edit, if needed.        Route: Subcutaneous     Cosign for Ordering: Accepted by Jose Maya MD on 1/20/2024 12:28 PM     Insulin Lispro (humaLOG) injection 4-24 Units 4-24 Units 4 Times Daily Before Meals & Nightly 11/6/2023 -- Type 2 Diabetes     Admin Instructions: Correction Insulin - High Dose (Total Insulin Dose Greater Than 80 units/day, Insulin Resistant Patient, Patient With Infection, Steroid Treatment)    Blood Glucose 150-199 mg/dL - 4 units  Blood Glucose 200-249 mg/dL - 8 units  Blood Glucose 250-299 mg/dL - 12 units  Blood Glucose 300-349 mg/dL - 16 units  Blood Glucose 350-400 mg/dL - 20 units  Blood Glucose Greater Than 400 mg/dL - 24 units & Call Provider   Caution: Look alike/sound alike drug alert     Route: Subcutaneous     lisinopril (PRINIVIL,ZESTRIL) tablet 2.5 mg 2.5 mg Every 24 Hours Scheduled 11/7/2023 -- Hypertension     Admin Instructions: Hold for SBP less than 100     Route: Oral     melatonin tablet 10 mg 10 mg Nightly 12/8/2023 -- Sleep     Route: Oral     ondansetron (ZOFRAN) injection 4 mg 4 mg Every 6 Hours PRN 11/6/2023 -- PRN nausea, vomiting     Admin Instructions: If BOTH ondansetron (ZOFRAN) and promethazine (PHENERGAN) are ordered use ondansetron first and THEN promethazine IF ondansetron is ineffective.     Route: Intravenous     ondansetron ODT (ZOFRAN-ODT) disintegrating tablet 4 mg 4 mg Every 6 Hours PRN 11/6/2023 -- PRN nausea, vomiting     Admin Instructions: \"If multiple N/V medications ordered, use in the following order: Ondansetron, Prochlorperazine, Promethazine. Use PO unless patient refuses or patient unable to swallow.\"  Place on " tongue and allow to dissolve.     Route: Oral     oseltamivir (TAMIFLU) capsule 75 mg 75 mg Every 12 Hours Scheduled 1/18/2024 1/23/2024 Influenza A     Route: Oral     oxyCODONE-acetaminophen (PERCOCET) 7.5-325 MG per tablet 1 tablet 1 tablet Every 6 Hours PRN 12/5/2023 -- PRN severe pain     Admin Instructions: Based on patient request - if ordered for moderate or severe pain, provider allows for administration of a medication prescribed for a lower pain scale.  [NIKIA]    Do not exceed 4 grams of acetaminophen in a 24 hr period. Max dose of 2gm for AST/ALT greater than 120 units/L        If given for pain, use the following pain scale:   Mild Pain = Pain Score of 1-3, CPOT 1-2  Moderate Pain = Pain Score of 4-6, CPOT 3-4  Severe Pain = Pain Score of 7-10, CPOT 5-8     Route: Oral     pneumococcal conj. 13-valent (PREVNAR-13) vaccine 0.5 mL 0.5 mL During Hospitalization 11/6/2023 -- Immunization     Admin Instructions:      Route: Intramuscular     pregabalin (LYRICA) capsule 100 mg 100 mg 3 Times Daily 11/7/2023 -- Neuropathic pain    Admin Instructions:      Route: Oral     saccharomyces boulardii (FLORASTOR) capsule 500 mg 500 mg 2 Times Daily 1/6/2024 -- Probiotic     Route: Oral     Cosign for Ordering: Accepted by Jose Maya MD on 1/6/2024  1:37 PM     sertraline (ZOLOFT) tablet 25 mg 25 mg Nightly 12/8/2023 -- Anxiety      Route: Oral     Cosign for Ordering: Accepted by Jose Maya MD on 12/8/2023  5:26 PM     simethicone (MYLICON) chewable tablet 80 mg 80 mg 4 Times Daily PRN 11/6/2023 -- PRN Flatulence     Route: Oral     sodium chloride 0.9 % flush 10 mL 10 mL As Needed 11/6/2023 -- Line care    Route: Intravenous     sodium chloride 0.9 % infusion 40 mL 40 mL As Needed 11/6/2023 -- Line care    Admin Instructions: Following administration of an IV intermittent medication, flush line with 40mL NS at 100mL/hr.     Route: Intravenous     vitamin B-12 (CYANOCOBALAMIN) tablet 1,000 mcg 1,000 mcg Daily  11/7/2023 -- Supplement     Route: Oral                Psychotropic Medications  Baclofen 10 mg three times daily PRN   Sertraline 25 mg daily     Potentially Clinically Significant Medication Issues/PharmD Recommendations:   Psychotropic Medication: 14 day stop date added to Baclofen 10 mg three times daily PRN   Opioid Use Review:  Oxycodone 7.5 mg/325 mg every 6 hours   Medication without an appropriate indication: N/A  Untreated indication: N/A   Missing Duration: 14 day stop date added for Baclofen  Excessive or Inadequate Dose: N/A   Ineffective Drug Therapy: N/A   Medication Adverse Reactions/Consequences: N/A   Medication Monitoring: N/A   Drug Interactions: N/A   Duplicate Therapy: N/A   PTA Medication Omissions (not currently ordered): N/A   Safety: N/A       Additional notes: 14 day stop date added per protocol to baclofen. If therapy continued beyond 14 days, consider documenting necessity and PRN basis       In completing this Drug Regimen Review for [employer] [facility] have reviewed the electronic medical chart contents, including but not limited to the following: Medication Administration Records (MAR), Prescriber's orders, Progress, nursing and consultants' notes, Laboratory and diagnostic test results, Other sources of information about documented expressions or indications of distress and/or changes in condition.

## 2024-01-23 NOTE — PLAN OF CARE
Goal Outcome Evaluation:  Plan of Care Reviewed With: patient        Progress: improving  Outcome Evaluation: Resident alert, oriented, able to malke needs known to staff. Remained in bed all day. medicated for prn pain x1, prn Baclofen x1, effective. Blood glucose stable for BF and lunch, elevated at dinner, covered with sliding scale. several episodes of bowel incontinence today, ubaldo like consistancy. several total bedchanges today. Resident pleasant and cooperative.

## 2024-01-24 LAB
GLUCOSE BLDC GLUCOMTR-MCNC: 125 MG/DL (ref 70–99)
GLUCOSE BLDC GLUCOMTR-MCNC: 150 MG/DL (ref 70–99)
GLUCOSE BLDC GLUCOMTR-MCNC: 154 MG/DL (ref 70–99)
GLUCOSE BLDC GLUCOMTR-MCNC: 72 MG/DL (ref 70–99)

## 2024-01-24 PROCEDURE — 82948 REAGENT STRIP/BLOOD GLUCOSE: CPT

## 2024-01-24 PROCEDURE — 63710000001 INSULIN LISPRO (HUMAN) PER 5 UNITS: Performed by: INTERNAL MEDICINE

## 2024-01-24 PROCEDURE — 82948 REAGENT STRIP/BLOOD GLUCOSE: CPT | Performed by: INTERNAL MEDICINE

## 2024-01-24 PROCEDURE — 63710000001 INSULIN DETEMIR PER 5 UNITS: Performed by: PHYSICIAN ASSISTANT

## 2024-01-24 PROCEDURE — 63710000001 ONDANSETRON ODT 4 MG TABLET DISPERSIBLE: Performed by: INTERNAL MEDICINE

## 2024-01-24 RX ADMIN — OXYCODONE AND ACETAMINOPHEN 1 TABLET: 7.5; 325 TABLET ORAL at 15:41

## 2024-01-24 RX ADMIN — INSULIN DETEMIR 50 UNITS: 100 INJECTION, SOLUTION SUBCUTANEOUS at 08:37

## 2024-01-24 RX ADMIN — HYDROCORTISONE 1 APPLICATION: 10 CREAM TOPICAL at 20:36

## 2024-01-24 RX ADMIN — APIXABAN 5 MG: 5 TABLET, FILM COATED ORAL at 20:35

## 2024-01-24 RX ADMIN — CYANOCOBALAMIN TAB 500 MCG 1000 MCG: 500 TAB at 08:37

## 2024-01-24 RX ADMIN — ATORVASTATIN CALCIUM 10 MG: 10 TABLET, FILM COATED ORAL at 20:35

## 2024-01-24 RX ADMIN — LISINOPRIL 2.5 MG: 2.5 TABLET ORAL at 08:37

## 2024-01-24 RX ADMIN — INSULIN DETEMIR 50 UNITS: 100 INJECTION, SOLUTION SUBCUTANEOUS at 20:35

## 2024-01-24 RX ADMIN — Medication 500 MG: at 08:38

## 2024-01-24 RX ADMIN — FERROUS SULFATE TAB 325 MG (65 MG ELEMENTAL FE) 325 MG: 325 (65 FE) TAB at 08:16

## 2024-01-24 RX ADMIN — INSULIN LISPRO 4 UNITS: 100 INJECTION, SOLUTION INTRAVENOUS; SUBCUTANEOUS at 08:16

## 2024-01-24 RX ADMIN — APIXABAN 5 MG: 5 TABLET, FILM COATED ORAL at 08:37

## 2024-01-24 RX ADMIN — OXYCODONE AND ACETAMINOPHEN 1 TABLET: 7.5; 325 TABLET ORAL at 08:38

## 2024-01-24 RX ADMIN — OXYCODONE AND ACETAMINOPHEN 1 TABLET: 7.5; 325 TABLET ORAL at 23:57

## 2024-01-24 RX ADMIN — HYDROCORTISONE 1 APPLICATION: 10 CREAM TOPICAL at 08:39

## 2024-01-24 RX ADMIN — PREGABALIN 100 MG: 100 CAPSULE ORAL at 15:41

## 2024-01-24 RX ADMIN — SERTRALINE HYDROCHLORIDE 25 MG: 25 TABLET ORAL at 20:35

## 2024-01-24 RX ADMIN — Medication 500 MG: at 20:35

## 2024-01-24 RX ADMIN — INSULIN LISPRO 4 UNITS: 100 INJECTION, SOLUTION INTRAVENOUS; SUBCUTANEOUS at 11:59

## 2024-01-24 RX ADMIN — Medication 10 MG: at 20:35

## 2024-01-24 RX ADMIN — ONDANSETRON 4 MG: 4 TABLET, ORALLY DISINTEGRATING ORAL at 08:37

## 2024-01-24 RX ADMIN — EMPAGLIFLOZIN 10 MG: 10 TABLET, FILM COATED ORAL at 08:37

## 2024-01-24 RX ADMIN — PREGABALIN 100 MG: 100 CAPSULE ORAL at 08:37

## 2024-01-24 RX ADMIN — PREGABALIN 100 MG: 100 CAPSULE ORAL at 20:35

## 2024-01-24 RX ADMIN — BACLOFEN 10 MG: 10 TABLET ORAL at 03:02

## 2024-01-24 NOTE — PLAN OF CARE
Goal Outcome Evaluation:  Plan of Care Reviewed With: patient        Progress: improving  Outcome Evaluation: No significant changes this shift. Medicated for pain and muscle spasms, relieved. Will continue with his plan of care. Call light and personal items within reach.

## 2024-01-24 NOTE — PLAN OF CARE
Goal Outcome Evaluation:   Alert and oriented and pleasant with staff. X2 Max assist for transfers and ambulation. X2 admin PRN pain medication with relief of symptoms noted. Sitting up in bed, call light in reach.

## 2024-01-25 LAB
GLUCOSE BLDC GLUCOMTR-MCNC: 123 MG/DL (ref 70–99)
GLUCOSE BLDC GLUCOMTR-MCNC: 139 MG/DL (ref 70–99)
GLUCOSE BLDC GLUCOMTR-MCNC: 178 MG/DL (ref 70–99)
GLUCOSE BLDC GLUCOMTR-MCNC: 206 MG/DL (ref 70–99)

## 2024-01-25 PROCEDURE — 63710000001 INSULIN LISPRO (HUMAN) PER 5 UNITS: Performed by: INTERNAL MEDICINE

## 2024-01-25 PROCEDURE — 82948 REAGENT STRIP/BLOOD GLUCOSE: CPT

## 2024-01-25 PROCEDURE — 63710000001 INSULIN DETEMIR PER 5 UNITS: Performed by: PHYSICIAN ASSISTANT

## 2024-01-25 PROCEDURE — 82948 REAGENT STRIP/BLOOD GLUCOSE: CPT | Performed by: INTERNAL MEDICINE

## 2024-01-25 RX ADMIN — Medication 500 MG: at 09:35

## 2024-01-25 RX ADMIN — MICONAZOLE NITRATE 1 APPLICATION: 2 POWDER TOPICAL at 20:03

## 2024-01-25 RX ADMIN — PREGABALIN 100 MG: 100 CAPSULE ORAL at 16:45

## 2024-01-25 RX ADMIN — INSULIN LISPRO 8 UNITS: 100 INJECTION, SOLUTION INTRAVENOUS; SUBCUTANEOUS at 17:35

## 2024-01-25 RX ADMIN — BACLOFEN 10 MG: 10 TABLET ORAL at 03:18

## 2024-01-25 RX ADMIN — HYDROCORTISONE 1 APPLICATION: 10 CREAM TOPICAL at 09:35

## 2024-01-25 RX ADMIN — OXYCODONE AND ACETAMINOPHEN 1 TABLET: 7.5; 325 TABLET ORAL at 20:08

## 2024-01-25 RX ADMIN — SERTRALINE HYDROCHLORIDE 25 MG: 25 TABLET ORAL at 20:03

## 2024-01-25 RX ADMIN — PREGABALIN 100 MG: 100 CAPSULE ORAL at 20:03

## 2024-01-25 RX ADMIN — EMPAGLIFLOZIN 10 MG: 10 TABLET, FILM COATED ORAL at 09:35

## 2024-01-25 RX ADMIN — INSULIN LISPRO 4 UNITS: 100 INJECTION, SOLUTION INTRAVENOUS; SUBCUTANEOUS at 20:04

## 2024-01-25 RX ADMIN — APIXABAN 5 MG: 5 TABLET, FILM COATED ORAL at 20:03

## 2024-01-25 RX ADMIN — INSULIN DETEMIR 50 UNITS: 100 INJECTION, SOLUTION SUBCUTANEOUS at 09:34

## 2024-01-25 RX ADMIN — MICONAZOLE NITRATE 1 APPLICATION: 2 POWDER TOPICAL at 09:35

## 2024-01-25 RX ADMIN — INSULIN DETEMIR 50 UNITS: 100 INJECTION, SOLUTION SUBCUTANEOUS at 20:04

## 2024-01-25 RX ADMIN — PREGABALIN 100 MG: 100 CAPSULE ORAL at 09:35

## 2024-01-25 RX ADMIN — Medication 500 MG: at 20:03

## 2024-01-25 RX ADMIN — ATORVASTATIN CALCIUM 10 MG: 10 TABLET, FILM COATED ORAL at 20:03

## 2024-01-25 RX ADMIN — APIXABAN 5 MG: 5 TABLET, FILM COATED ORAL at 09:35

## 2024-01-25 RX ADMIN — FERROUS SULFATE TAB 325 MG (65 MG ELEMENTAL FE) 325 MG: 325 (65 FE) TAB at 08:08

## 2024-01-25 RX ADMIN — HYDROCORTISONE 1 APPLICATION: 10 CREAM TOPICAL at 20:03

## 2024-01-25 RX ADMIN — CYANOCOBALAMIN TAB 500 MCG 1000 MCG: 500 TAB at 09:35

## 2024-01-25 RX ADMIN — OXYCODONE AND ACETAMINOPHEN 1 TABLET: 7.5; 325 TABLET ORAL at 11:14

## 2024-01-25 RX ADMIN — LISINOPRIL 2.5 MG: 2.5 TABLET ORAL at 09:35

## 2024-01-25 RX ADMIN — Medication 10 MG: at 20:03

## 2024-01-25 NOTE — PLAN OF CARE
Goal Outcome Evaluation:   Alert and oriented and pleasant with staff. X1 assist for transfers and ambulation. X1 admin PRN pain medication with relief of symptoms noted. No significant changes noted this shift. Sitting up in bed, call light in reach.

## 2024-01-25 NOTE — PLAN OF CARE
Goal Outcome Evaluation:  Plan of Care Reviewed With: patient        Progress: improving  Outcome Evaluation: Alert and oriented x4; able to voice needs to staff. Percocet and Baclofen administered for pain as requested; see MAR. Using urinal; incontinent of bowels. Call light within reach; care plan ongoing.

## 2024-01-26 PROBLEM — L97.521: Status: ACTIVE | Noted: 2024-01-26

## 2024-01-26 PROBLEM — L97.519 ULCER OF RIGHT FOOT: Status: ACTIVE | Noted: 2022-05-02

## 2024-01-26 PROBLEM — B35.1 ONYCHOMYCOSIS: Status: ACTIVE | Noted: 2024-01-26

## 2024-01-26 LAB
GLUCOSE BLDC GLUCOMTR-MCNC: 110 MG/DL (ref 70–99)
GLUCOSE BLDC GLUCOMTR-MCNC: 137 MG/DL (ref 70–99)
GLUCOSE BLDC GLUCOMTR-MCNC: 253 MG/DL (ref 70–99)
GLUCOSE BLDC GLUCOMTR-MCNC: 288 MG/DL (ref 70–99)

## 2024-01-26 PROCEDURE — 63710000001 INSULIN LISPRO (HUMAN) PER 5 UNITS: Performed by: INTERNAL MEDICINE

## 2024-01-26 PROCEDURE — 99304 1ST NF CARE SF/LOW MDM 25: CPT | Performed by: PODIATRIST

## 2024-01-26 PROCEDURE — 99202 OFFICE O/P NEW SF 15 MIN: CPT | Performed by: PODIATRIST

## 2024-01-26 PROCEDURE — 11721 DEBRIDE NAIL 6 OR MORE: CPT | Performed by: PODIATRIST

## 2024-01-26 PROCEDURE — 63710000001 INSULIN DETEMIR PER 5 UNITS: Performed by: PHYSICIAN ASSISTANT

## 2024-01-26 PROCEDURE — 63710000001 ONDANSETRON ODT 4 MG TABLET DISPERSIBLE: Performed by: INTERNAL MEDICINE

## 2024-01-26 PROCEDURE — 82948 REAGENT STRIP/BLOOD GLUCOSE: CPT

## 2024-01-26 RX ADMIN — BACLOFEN 10 MG: 10 TABLET ORAL at 00:58

## 2024-01-26 RX ADMIN — MICONAZOLE NITRATE 1 APPLICATION: 2 POWDER TOPICAL at 08:50

## 2024-01-26 RX ADMIN — ATORVASTATIN CALCIUM 10 MG: 10 TABLET, FILM COATED ORAL at 20:26

## 2024-01-26 RX ADMIN — CYANOCOBALAMIN TAB 500 MCG 1000 MCG: 500 TAB at 08:45

## 2024-01-26 RX ADMIN — EMPAGLIFLOZIN 10 MG: 10 TABLET, FILM COATED ORAL at 08:45

## 2024-01-26 RX ADMIN — OXYCODONE AND ACETAMINOPHEN 1 TABLET: 7.5; 325 TABLET ORAL at 20:26

## 2024-01-26 RX ADMIN — APIXABAN 5 MG: 5 TABLET, FILM COATED ORAL at 20:26

## 2024-01-26 RX ADMIN — ONDANSETRON 4 MG: 4 TABLET, ORALLY DISINTEGRATING ORAL at 08:50

## 2024-01-26 RX ADMIN — INSULIN LISPRO 12 UNITS: 100 INJECTION, SOLUTION INTRAVENOUS; SUBCUTANEOUS at 17:27

## 2024-01-26 RX ADMIN — PREGABALIN 100 MG: 100 CAPSULE ORAL at 20:26

## 2024-01-26 RX ADMIN — Medication 10 MG: at 20:26

## 2024-01-26 RX ADMIN — BACLOFEN 10 MG: 10 TABLET ORAL at 14:01

## 2024-01-26 RX ADMIN — INSULIN DETEMIR 50 UNITS: 100 INJECTION, SOLUTION SUBCUTANEOUS at 20:24

## 2024-01-26 RX ADMIN — INSULIN DETEMIR 50 UNITS: 100 INJECTION, SOLUTION SUBCUTANEOUS at 08:47

## 2024-01-26 RX ADMIN — FERROUS SULFATE TAB 325 MG (65 MG ELEMENTAL FE) 325 MG: 325 (65 FE) TAB at 08:45

## 2024-01-26 RX ADMIN — HYDROCORTISONE 1 APPLICATION: 10 CREAM TOPICAL at 08:46

## 2024-01-26 RX ADMIN — Medication 500 MG: at 08:46

## 2024-01-26 RX ADMIN — OXYCODONE AND ACETAMINOPHEN 1 TABLET: 7.5; 325 TABLET ORAL at 08:50

## 2024-01-26 RX ADMIN — INSULIN LISPRO 12 UNITS: 100 INJECTION, SOLUTION INTRAVENOUS; SUBCUTANEOUS at 20:24

## 2024-01-26 RX ADMIN — BACLOFEN 10 MG: 10 TABLET ORAL at 22:48

## 2024-01-26 RX ADMIN — SERTRALINE HYDROCHLORIDE 25 MG: 25 TABLET ORAL at 20:26

## 2024-01-26 RX ADMIN — LISINOPRIL 2.5 MG: 2.5 TABLET ORAL at 08:45

## 2024-01-26 RX ADMIN — PREGABALIN 100 MG: 100 CAPSULE ORAL at 08:46

## 2024-01-26 RX ADMIN — MICONAZOLE NITRATE 1 APPLICATION: 2 POWDER TOPICAL at 20:25

## 2024-01-26 RX ADMIN — PREGABALIN 100 MG: 100 CAPSULE ORAL at 16:35

## 2024-01-26 RX ADMIN — APIXABAN 5 MG: 5 TABLET, FILM COATED ORAL at 08:45

## 2024-01-26 RX ADMIN — IBUPROFEN 400 MG: 400 TABLET ORAL at 03:15

## 2024-01-26 RX ADMIN — Medication 500 MG: at 20:26

## 2024-01-26 NOTE — CONSULTS
01/26/24   Foot and Ankle Surgery - Consult  Provider: Ha Nick DPM  Location: [facility]    Subjective:  Jose Shaikh is a 63 y.o. male.     No chief complaint on file.      Patient is a 63-year-old diabetic male presenting with ulcers on his bilateral feet.  Patient also has elongated dystrophic toenails on his left side.  Patient states that his ulcer on his right foot had healed and it had now reoccurred.  Patient states that he has an ulcer on his fourth toe on his left foot as well which is healing.  He is diabetic with neuropathy.    Allergies   Allergen Reactions    Adhesive Tape Rash       Past Medical History:   Diagnosis Date    Absence of toe of right foot     Acute osteomyelitis of left calcaneus  08/18/2021    Anxiety and depression     Arthritis     Cancer     Chronic pain     STATES HIS PAIN IS 10/10 AAT    Claustrophobia     Corns and callus     Diabetic ulcer of left heel associated with type 2 DM 08/18/2021    Diabetic ulcer of left heel associated with type 2 DM 07/06/2021    Diabetic ulcer of right midfoot associated with type 2 DM 08/18/2021    Difficulty walking     Essential hypertension 08/31/2021    Hammertoe     Hyperlipidemia LDL goal <100 08/31/2021    Ingrown toenail     Obesity     Paroxysmal atrial fibrillation 08/31/2021    Polyneuropathy     Pressure ulcer, stage 1     Tinea unguium     Type 2 diabetes mellitus with polyneuropathy        Past Surgical History:   Procedure Laterality Date    CYST REMOVAL      center of back; benign    EYE SURGERY      INCISION AND DRAINAGE ABSCESS      back    INCISION AND DRAINAGE LEG Right 12/10/2021    Procedure: INCISION AND DRAINAGE LOWER EXTREMITY;  Surgeon: Ash Leyva DPM;  Location: [facility];  Service: Podiatry;  Laterality: Right;    OTHER SURGICAL HISTORY      Surgical clips left foot    TOE SURGERY Right     Removal of 5th toe    TRANS METATARSAL AMPUTATION Right 12/02/2021    Procedure: AMPUTATION  TRANS METATARSAL;  Surgeon: Ash Leyva DPM;  Location: [facility] MAIN OR;  Service: Podiatry;  Laterality: Right;    VASCULAR SURGERY      WRIST SURGERY Left     repair of injury       Family History   Problem Relation Age of Onset    Hearing loss Mother     Depression Mother     Arthritis Mother     Heart disease Mother     Heart disease Father     Cancer Father         Unspecified    Cancer Sister     Hearing loss Brother     Heart disease Brother     Diabetes Paternal Grandmother     Learning disabilities Nephew     Drug abuse Nephew     COPD Nephew     Alcohol abuse Nephew        Social History     Socioeconomic History    Marital status:    Tobacco Use    Smoking status: Former     Packs/day: 0.00     Years: 1.00     Additional pack years: 0.00     Total pack years: 0.00     Types: Cigarettes     Quit date: 1991     Years since quittin.4    Smokeless tobacco: Never    Tobacco comments:     quit at age 30   Vaping Use    Vaping Use: Never used   Substance and Sexual Activity    Alcohol use: Yes     Comment: rare    Drug use: Yes     Types: Marijuana     Comment: occasionally, EVERY 3-4 MONTH    Sexual activity: Defer          Current Facility-Administered Medications:     acetaminophen (TYLENOL) tablet 650 mg, 650 mg, Oral, Q4H PRN, Max Mehta MD, 650 mg at 23 0208    apixaban (ELIQUIS) tablet 5 mg, 5 mg, Oral, Q12H, Max Mehta MD, 5 mg at 24    atorvastatin (LIPITOR) tablet 10 mg, 10 mg, Oral, Nightly, Max Mehta MD, 10 mg at 24    baclofen (LIORESAL) tablet 10 mg, 10 mg, Oral, TID PRN, Cailin Acosta MD, 10 mg at 24 0058    benzonatate (TESSALON) capsule 100 mg, 100 mg, Oral, TID PRN, Anahi Vilchis PA-C, 100 mg at 24 0117    bismuth subsalicylate (PEPTO BISMOL) chewable tablet 262 mg, 262 mg, Oral, Daily PRN, Kamron Lawrence PA, 262 mg at 24 1001    dextrose (D50W) (25 g/50 mL) IV  injection 25 g, 25 g, Intravenous, Q15 Min PRN, Max Mehta MD    dextrose (GLUTOSE) oral gel 15 g, 15 g, Oral, Q15 Min PRN, Max Mehta MD    empagliflozin (JARDIANCE) tablet 10 mg, 10 mg, Oral, Daily, Max Mehta MD, 10 mg at 01/25/24 0935    ferrous sulfate tablet 325 mg, 325 mg, Oral, Daily With Breakfast, Omar Asher PA, 325 mg at 01/25/24 0808    glucagon (GLUCAGEN) injection 1 mg, 1 mg, Intramuscular, Q15 Min PRN, Max Mehta MD    hydrocortisone 1 % cream 1 application , 1 application , Topical, Q12H, Omar Asher PA, 1 application  at 01/25/24 2003    ibuprofen (ADVIL,MOTRIN) tablet 400 mg, 400 mg, Oral, BID PRN, Omar Asher PA, 400 mg at 01/26/24 0315    insulin detemir (LEVEMIR) injection 50 Units, 50 Units, Subcutaneous, Daily, Kamron Lawrence PA, 50 Units at 01/25/24 0934    insulin detemir (LEVEMIR) injection 50 Units, 50 Units, Subcutaneous, Nightly, Kamron Lawrence PA, 50 Units at 01/25/24 2004    Insulin Lispro (humaLOG) injection 4-24 Units, 4-24 Units, Subcutaneous, 4x Daily AC & at Bedtime, Max Mehta MD, 4 Units at 01/25/24 2004    lisinopril (PRINIVIL,ZESTRIL) tablet 2.5 mg, 2.5 mg, Oral, Q24H, Omar Asher PA, 2.5 mg at 01/25/24 0935    melatonin tablet 10 mg, 10 mg, Oral, Nightly, Kamron Lawrence PA, 10 mg at 01/25/24 2003    miconazole (MICOTIN) 2 % powder 1 application , 1 Application, Topical, Q12H, Omar Asher PA, 1 application  at 01/25/24 2003    ondansetron (ZOFRAN) injection 4 mg, 4 mg, Intravenous, Q6H PRN, Mxa Mehta MD    ondansetron ODT (ZOFRAN-ODT) disintegrating tablet 4 mg, 4 mg, Oral, Q6H PRN, Max Mehta MD, 4 mg at 01/24/24 0837    oxyCODONE-acetaminophen (PERCOCET) 7.5-325 MG per tablet 1 tablet, 1 tablet, Oral, Q6H PRN, John Azar MD, 1 tablet at 01/25/24 2008    pneumococcal conj. 13-valent (PREVNAR-13) vaccine 0.5 mL, 0.5 mL, Intramuscular, During  "Hospitalization, Max Mehta MD    pregabalin (LYRICA) capsule 100 mg, 100 mg, Oral, TID, Max Mehta MD, 100 mg at 01/25/24 2003    saccharomyces boulardii (FLORASTOR) capsule 500 mg, 500 mg, Oral, BID, Kamron Lawrence PA, 500 mg at 01/25/24 2003    sertraline (ZOLOFT) tablet 25 mg, 25 mg, Oral, Nightly, Kamron Lawrence PA, 25 mg at 01/25/24 2003    simethicone (MYLICON) chewable tablet 80 mg, 80 mg, Oral, 4x Daily PRN, Max Mehta MD    sodium chloride 0.9 % flush 10 mL, 10 mL, Intravenous, PRN, Max Mehta MD    sodium chloride 0.9 % infusion 40 mL, 40 mL, Intravenous, PRN, Max Mehta MD    vitamin B-12 (CYANOCOBALAMIN) tablet 1,000 mcg, 1,000 mcg, Oral, Daily, Max Mehta MD, 1,000 mcg at 01/25/24 0935    Review of Systems:  General: Denies fever, chills, fatigue, and weakness.  Eyes: Denies vision loss, blurry vision, and excessive redness.  ENT: Denies hearing issues and difficulty swallowing.  Cardiovascular: Denies palpitations, chest pain, or syncopal episodes.  Respiratory: Denies shortness of breath, wheezing, and coughing.  GI: Denies abdominal pain, nausea, and vomiting.   : Denies frequency, hematuria, and urgency.  Musculoskeletal: Denies muscle cramps, joint pains, and stiffness.  Derm: Denies rash, open wounds, or suspicious lesions.  Neuro: Denies headaches, numbness, loss of coordination, and tremors.  Psych: Denies anxiety and depression.  Endocrine: Denies temperature intolerance and changes in appetite.  Heme: Denies bleeding disorders or abnormal bruising.     Objective   /70 (BP Location: Right arm, Patient Position: Lying)   Pulse 80   Temp 97.8 °F (36.6 °C) (Oral)   Resp 20   Ht 188 cm (74.02\")   Wt (!) 169 kg (372 lb 2.2 oz)   SpO2 96%   BMI 47.76 kg/m²     Foot/Ankle Exam    GENERAL  Appearance:  appears stated age  Orientation:  AAOx3  Affect:  appropriate  Gait:  unimpaired  Assistance:  " independent  Right shoe gear: casual shoe  Left shoe gear: casual shoe    VASCULAR     Right Foot Vascularity   Normal vascular exam    Dorsalis pedis:  2+  Posterior tibial:  2+  Skin temperature:  warm  Edema grading:  None  CFT:  < 3 seconds  Pedal hair growth:  Present  Varicosities:  none     Left Foot Vascularity   Normal vascular exam    Dorsalis pedis:  2+  Posterior tibial:  2+  Skin temperature:  warm  Edema grading:  None  CFT:  < 3 seconds  Pedal hair growth:  Present  Varicosities:  none     NEUROLOGIC     Right Foot Neurologic   Light touch sensation: absent  Vibratory sensation: absent  Hot/Cold sensation: absent     Left Foot Neurologic   Light touch sensation: absent  Vibratory sensation: absent  Hot/Cold sensation:  absent    MUSCULOSKELETAL     Right Foot Musculoskeletal    Amputation   Toes amputated: first toe, second toe, third toe, fourth toe and fifth toe    MUSCLE STRENGTH     Right Foot Muscle Strength   Foot dorsiflexion:  4  Foot plantar flexion:  4  Foot inversion:  4  Foot eversion:  4     Left Foot Muscle Strength   Foot dorsiflexion:  4  Foot plantar flexion:  4  Foot inversion:  4  Foot eversion:  4    RANGE OF MOTION     Right Foot Range of Motion   Foot and ankle ROM within normal limits       Left Foot Range of Motion   Foot and ankle ROM within normal limits      DERMATOLOGIC      Right Foot Dermatologic   Skin  Right foot skin is intact.      Left Foot Dermatologic   Skin  Left foot skin is intact.   Nails  1.  Positive for elongated and onychomycosis.  2.  Positive for elongated and onychomycosis.  3.  Positive for elongated and onychomycosis.  4.  Positive for elongated and onychomycosis.  5.  Positive for elongated and onychomycosis.     Right foot additional comments: Ulcer to midfoot stump on right foot.  No erythema no malodor no drainage, to the level of subcutaneous tissue.     Left foot additional comments: Ulcer to left fourth toe with mild periwound erythema.  No  malodor no drainage.            Results from last 7 days   Lab Units 01/21/24  0455   WBC 10*3/mm3 3.34*   HEMOGLOBIN g/dL 12.6*   HEMATOCRIT % 40.1   PLATELETS 10*3/mm3 70*       Assessment & Plan     Active Hospital Problems    Diagnosis     **Debility     Ulcerated, foot, left, limited to breakdown of skin     Onychomycosis     Ulcer of right foot          Patient examined and evaluated    Continue with local wound care on bilateral feet    Patient can follow-up outpatient after discharge    Patient with high risk of ulcer recurrence due to comorbidities and previous amputations    Thank you for the consultation and allowing me to participate in this patient's care. Please call with any additional questions or concerns.     Nails 1-5 left foot were debrided in length and thickenss using a nail nipper. Patient tolerated procedure well. No complications occured.    Part of this note may be an electronic transcription/translation of spoken language to printed text using the Dragon Dictation System.

## 2024-01-26 NOTE — PLAN OF CARE
Goal Outcome Evaluation:  Plan of Care Reviewed With: patient        Progress: no change  Outcome Evaluation: Alert and oriented x4; able to make needs known to staff. Assists in turns to be cleaned up after BM's. Antifungal powder applied to inner thigh and groin yeasty rash per MAR. Percocet, Baclofen, and Motrin administered per MAR for complaints of left hip pain. Call light within reach; care plan ongoing.

## 2024-01-26 NOTE — PLAN OF CARE
Goal Outcome Evaluation:   Alert and oriented and pleasant with staff. X2 max assist for transfers and ambulation. X2 admin PRN pain medication, with relief of symptoms noted this shift. No significant changes noted this shift. Sitting up in bed, call light in reach.

## 2024-01-27 LAB
GLUCOSE BLDC GLUCOMTR-MCNC: 122 MG/DL (ref 70–99)
GLUCOSE BLDC GLUCOMTR-MCNC: 206 MG/DL (ref 70–99)
GLUCOSE BLDC GLUCOMTR-MCNC: 81 MG/DL (ref 70–99)
GLUCOSE BLDC GLUCOMTR-MCNC: 98 MG/DL (ref 70–99)

## 2024-01-27 PROCEDURE — 63710000001 INSULIN DETEMIR PER 5 UNITS: Performed by: PHYSICIAN ASSISTANT

## 2024-01-27 PROCEDURE — 63710000001 INSULIN LISPRO (HUMAN) PER 5 UNITS: Performed by: INTERNAL MEDICINE

## 2024-01-27 PROCEDURE — 82948 REAGENT STRIP/BLOOD GLUCOSE: CPT

## 2024-01-27 PROCEDURE — 82948 REAGENT STRIP/BLOOD GLUCOSE: CPT | Performed by: INTERNAL MEDICINE

## 2024-01-27 RX ORDER — BISMUTH SUBSALICYLATE 262 MG/1
524 TABLET, CHEWABLE ORAL 2 TIMES DAILY PRN
Status: DISCONTINUED | OUTPATIENT
Start: 2024-01-27 | End: 2024-02-05

## 2024-01-27 RX ADMIN — ATORVASTATIN CALCIUM 10 MG: 10 TABLET, FILM COATED ORAL at 20:12

## 2024-01-27 RX ADMIN — INSULIN DETEMIR 50 UNITS: 100 INJECTION, SOLUTION SUBCUTANEOUS at 20:10

## 2024-01-27 RX ADMIN — BACLOFEN 10 MG: 10 TABLET ORAL at 20:12

## 2024-01-27 RX ADMIN — SERTRALINE HYDROCHLORIDE 25 MG: 25 TABLET ORAL at 20:12

## 2024-01-27 RX ADMIN — Medication 10 MG: at 20:11

## 2024-01-27 RX ADMIN — IBUPROFEN 400 MG: 400 TABLET ORAL at 00:06

## 2024-01-27 RX ADMIN — APIXABAN 5 MG: 5 TABLET, FILM COATED ORAL at 20:12

## 2024-01-27 RX ADMIN — FERROUS SULFATE TAB 325 MG (65 MG ELEMENTAL FE) 325 MG: 325 (65 FE) TAB at 09:37

## 2024-01-27 RX ADMIN — OXYCODONE AND ACETAMINOPHEN 1 TABLET: 7.5; 325 TABLET ORAL at 23:50

## 2024-01-27 RX ADMIN — Medication 500 MG: at 09:36

## 2024-01-27 RX ADMIN — APIXABAN 5 MG: 5 TABLET, FILM COATED ORAL at 09:37

## 2024-01-27 RX ADMIN — EMPAGLIFLOZIN 10 MG: 10 TABLET, FILM COATED ORAL at 09:37

## 2024-01-27 RX ADMIN — INSULIN LISPRO 8 UNITS: 100 INJECTION, SOLUTION INTRAVENOUS; SUBCUTANEOUS at 20:10

## 2024-01-27 RX ADMIN — HYDROCORTISONE 1 APPLICATION: 10 CREAM TOPICAL at 20:10

## 2024-01-27 RX ADMIN — CYANOCOBALAMIN TAB 500 MCG 1000 MCG: 500 TAB at 09:36

## 2024-01-27 RX ADMIN — BISMUTH SUBSALICYLATE 262 MG: 262 TABLET, CHEWABLE ORAL at 15:27

## 2024-01-27 RX ADMIN — HYDROCORTISONE 1 APPLICATION: 10 CREAM TOPICAL at 09:37

## 2024-01-27 RX ADMIN — MICONAZOLE NITRATE 1 APPLICATION: 2 POWDER TOPICAL at 20:10

## 2024-01-27 RX ADMIN — OXYCODONE AND ACETAMINOPHEN 1 TABLET: 7.5; 325 TABLET ORAL at 14:20

## 2024-01-27 RX ADMIN — INSULIN DETEMIR 50 UNITS: 100 INJECTION, SOLUTION SUBCUTANEOUS at 09:36

## 2024-01-27 RX ADMIN — PREGABALIN 100 MG: 100 CAPSULE ORAL at 09:37

## 2024-01-27 RX ADMIN — BISMUTH SUBSALICYLATE 524 MG: 262 TABLET, CHEWABLE ORAL at 20:12

## 2024-01-27 RX ADMIN — PREGABALIN 100 MG: 100 CAPSULE ORAL at 15:27

## 2024-01-27 RX ADMIN — PREGABALIN 100 MG: 100 CAPSULE ORAL at 20:12

## 2024-01-27 RX ADMIN — BACLOFEN 10 MG: 10 TABLET ORAL at 09:43

## 2024-01-27 RX ADMIN — OXYCODONE AND ACETAMINOPHEN 1 TABLET: 7.5; 325 TABLET ORAL at 05:47

## 2024-01-27 RX ADMIN — LISINOPRIL 2.5 MG: 2.5 TABLET ORAL at 09:37

## 2024-01-27 RX ADMIN — Medication 500 MG: at 20:11

## 2024-01-27 RX ADMIN — SIMETHICONE 80 MG: 80 TABLET, CHEWABLE ORAL at 21:41

## 2024-01-27 RX ADMIN — MICONAZOLE NITRATE 1 APPLICATION: 2 POWDER TOPICAL at 09:43

## 2024-01-27 NOTE — PLAN OF CARE
Goal Outcome Evaluation:  Plan of Care Reviewed With: patient        Progress: no change  Outcome Evaluation: Resident alert, oriented, able to make needs known to staff. remains bedbound. Blood glucose stable for all 3 meals. Medicated for prn pain x1, effective. Medicated with prn baclofen per request x1, effective. medicated with pepto Bismol x1 for loose stools, remains incont. of bowel. entertaines self with puzzle books and smartphone and TV. resident pleasant and cooperative.

## 2024-01-27 NOTE — PLAN OF CARE
Goal Outcome Evaluation:  Plan of Care Reviewed With: patient        Progress: no change  Outcome Evaluation: Alert and oriented. Vital signs stable. Percocet, baclofen, and ibuprofen administered during night for left hip and shoulder pain. Assists with turning for incontinent care and uses trapeze for repositioning. Antifungal powder to groin and buttocks as ordered. Currently resting quietly with no s/s of distress.

## 2024-01-28 LAB
GLUCOSE BLDC GLUCOMTR-MCNC: 101 MG/DL (ref 70–99)
GLUCOSE BLDC GLUCOMTR-MCNC: 105 MG/DL (ref 70–99)
GLUCOSE BLDC GLUCOMTR-MCNC: 172 MG/DL (ref 70–99)
GLUCOSE BLDC GLUCOMTR-MCNC: 218 MG/DL (ref 70–99)
GLUCOSE BLDC GLUCOMTR-MCNC: 286 MG/DL (ref 70–99)

## 2024-01-28 PROCEDURE — 82948 REAGENT STRIP/BLOOD GLUCOSE: CPT | Performed by: INTERNAL MEDICINE

## 2024-01-28 PROCEDURE — 63710000001 INSULIN DETEMIR PER 5 UNITS: Performed by: PHYSICIAN ASSISTANT

## 2024-01-28 PROCEDURE — 82948 REAGENT STRIP/BLOOD GLUCOSE: CPT

## 2024-01-28 PROCEDURE — 63710000001 INSULIN LISPRO (HUMAN) PER 5 UNITS: Performed by: INTERNAL MEDICINE

## 2024-01-28 RX ADMIN — IBUPROFEN 400 MG: 400 TABLET ORAL at 02:23

## 2024-01-28 RX ADMIN — INSULIN DETEMIR 50 UNITS: 100 INJECTION, SOLUTION SUBCUTANEOUS at 21:12

## 2024-01-28 RX ADMIN — INSULIN LISPRO 8 UNITS: 100 INJECTION, SOLUTION INTRAVENOUS; SUBCUTANEOUS at 11:56

## 2024-01-28 RX ADMIN — MICONAZOLE NITRATE 1 APPLICATION: 2 POWDER TOPICAL at 21:13

## 2024-01-28 RX ADMIN — IBUPROFEN 400 MG: 400 TABLET ORAL at 15:42

## 2024-01-28 RX ADMIN — Medication 10 MG: at 21:15

## 2024-01-28 RX ADMIN — CYANOCOBALAMIN TAB 500 MCG 1000 MCG: 500 TAB at 09:04

## 2024-01-28 RX ADMIN — EMPAGLIFLOZIN 10 MG: 10 TABLET, FILM COATED ORAL at 09:00

## 2024-01-28 RX ADMIN — OXYCODONE AND ACETAMINOPHEN 1 TABLET: 7.5; 325 TABLET ORAL at 21:15

## 2024-01-28 RX ADMIN — FERROUS SULFATE TAB 325 MG (65 MG ELEMENTAL FE) 325 MG: 325 (65 FE) TAB at 09:00

## 2024-01-28 RX ADMIN — MICONAZOLE NITRATE 1 APPLICATION: 2 POWDER TOPICAL at 09:01

## 2024-01-28 RX ADMIN — BISMUTH SUBSALICYLATE 524 MG: 262 TABLET, CHEWABLE ORAL at 19:38

## 2024-01-28 RX ADMIN — ATORVASTATIN CALCIUM 10 MG: 10 TABLET, FILM COATED ORAL at 21:15

## 2024-01-28 RX ADMIN — LISINOPRIL 2.5 MG: 2.5 TABLET ORAL at 08:59

## 2024-01-28 RX ADMIN — Medication 500 MG: at 21:15

## 2024-01-28 RX ADMIN — Medication 500 MG: at 08:59

## 2024-01-28 RX ADMIN — PREGABALIN 100 MG: 100 CAPSULE ORAL at 21:15

## 2024-01-28 RX ADMIN — OXYCODONE AND ACETAMINOPHEN 1 TABLET: 7.5; 325 TABLET ORAL at 09:04

## 2024-01-28 RX ADMIN — PREGABALIN 100 MG: 100 CAPSULE ORAL at 15:42

## 2024-01-28 RX ADMIN — PREGABALIN 100 MG: 100 CAPSULE ORAL at 08:59

## 2024-01-28 RX ADMIN — SERTRALINE HYDROCHLORIDE 25 MG: 25 TABLET ORAL at 21:15

## 2024-01-28 RX ADMIN — INSULIN DETEMIR 50 UNITS: 100 INJECTION, SOLUTION SUBCUTANEOUS at 08:59

## 2024-01-28 RX ADMIN — BACLOFEN 10 MG: 10 TABLET ORAL at 11:56

## 2024-01-28 RX ADMIN — BISMUTH SUBSALICYLATE 524 MG: 262 TABLET, CHEWABLE ORAL at 09:13

## 2024-01-28 RX ADMIN — APIXABAN 5 MG: 5 TABLET, FILM COATED ORAL at 08:59

## 2024-01-28 RX ADMIN — APIXABAN 5 MG: 5 TABLET, FILM COATED ORAL at 21:15

## 2024-01-28 RX ADMIN — HYDROCORTISONE 1 APPLICATION: 10 CREAM TOPICAL at 21:13

## 2024-01-28 RX ADMIN — INSULIN LISPRO 12 UNITS: 100 INJECTION, SOLUTION INTRAVENOUS; SUBCUTANEOUS at 21:13

## 2024-01-28 RX ADMIN — INSULIN LISPRO 4 UNITS: 100 INJECTION, SOLUTION INTRAVENOUS; SUBCUTANEOUS at 18:07

## 2024-01-28 NOTE — PLAN OF CARE
"Goal Outcome Evaluation:  Plan of Care Reviewed With: patient        Progress: no change  Outcome Evaluation: Resident alert, oriented, able to make needs known to staff. remains bedbound. refuses to turn and lays on his back all shift. incont. of bowel x2 this shift. discussed using bedpan, resident stated \"i am not sitting on a G.D. bedpan for who knows how long cause it hurts. u can just clean up the bed. Medicated with prn Pepto Bismol for loose stools. effevtive so far. Medicated for prn pain/spasms x1 with percocet, x1 with baclofen and x1 with Ibuprofen, effective. blood glucose stable this am, elevated for lunch and dinner. Resident doordashed snack foods and pizza this afternoon. discussed high blood sugars and insulin use. Resident stated \"I don't care about that\". will continue current plan of care.                               "

## 2024-01-28 NOTE — PLAN OF CARE
Goal Outcome Evaluation:  Plan of Care Reviewed With: patient        Progress: no change  Outcome Evaluation: Vital signs stable. Alert and oriented. No significant change. PRN baclofen, percocet, and ibuprofen administered during night for left hip and shoulder pain. Pepto bismol and simethicone administered stomach discomfort. Continue plan of care.

## 2024-01-29 LAB
GLUCOSE BLDC GLUCOMTR-MCNC: 102 MG/DL (ref 70–99)
GLUCOSE BLDC GLUCOMTR-MCNC: 118 MG/DL (ref 70–99)
GLUCOSE BLDC GLUCOMTR-MCNC: 180 MG/DL (ref 70–99)
GLUCOSE BLDC GLUCOMTR-MCNC: 186 MG/DL (ref 70–99)

## 2024-01-29 PROCEDURE — 63710000001 INSULIN DETEMIR PER 5 UNITS: Performed by: PHYSICIAN ASSISTANT

## 2024-01-29 PROCEDURE — 63710000001 INSULIN LISPRO (HUMAN) PER 5 UNITS: Performed by: INTERNAL MEDICINE

## 2024-01-29 PROCEDURE — 93005 ELECTROCARDIOGRAM TRACING: CPT | Performed by: PHYSICIAN ASSISTANT

## 2024-01-29 PROCEDURE — 82948 REAGENT STRIP/BLOOD GLUCOSE: CPT

## 2024-01-29 PROCEDURE — 82948 REAGENT STRIP/BLOOD GLUCOSE: CPT | Performed by: INTERNAL MEDICINE

## 2024-01-29 PROCEDURE — 93010 ELECTROCARDIOGRAM REPORT: CPT | Performed by: INTERNAL MEDICINE

## 2024-01-29 RX ADMIN — CYANOCOBALAMIN TAB 500 MCG 1000 MCG: 500 TAB at 08:37

## 2024-01-29 RX ADMIN — Medication 10 MG: at 20:12

## 2024-01-29 RX ADMIN — BACLOFEN 10 MG: 10 TABLET ORAL at 03:27

## 2024-01-29 RX ADMIN — HYDROCORTISONE 1 APPLICATION: 10 CREAM TOPICAL at 08:39

## 2024-01-29 RX ADMIN — BISMUTH SUBSALICYLATE 524 MG: 262 TABLET, CHEWABLE ORAL at 09:45

## 2024-01-29 RX ADMIN — INSULIN LISPRO 4 UNITS: 100 INJECTION, SOLUTION INTRAVENOUS; SUBCUTANEOUS at 17:38

## 2024-01-29 RX ADMIN — ATORVASTATIN CALCIUM 10 MG: 10 TABLET, FILM COATED ORAL at 20:12

## 2024-01-29 RX ADMIN — APIXABAN 5 MG: 5 TABLET, FILM COATED ORAL at 08:36

## 2024-01-29 RX ADMIN — BACLOFEN 10 MG: 10 TABLET ORAL at 11:38

## 2024-01-29 RX ADMIN — EMPAGLIFLOZIN 10 MG: 10 TABLET, FILM COATED ORAL at 08:37

## 2024-01-29 RX ADMIN — MICONAZOLE NITRATE 1 APPLICATION: 2 POWDER TOPICAL at 20:23

## 2024-01-29 RX ADMIN — HYDROCORTISONE 1 APPLICATION: 10 CREAM TOPICAL at 20:23

## 2024-01-29 RX ADMIN — OXYCODONE AND ACETAMINOPHEN 1 TABLET: 7.5; 325 TABLET ORAL at 17:38

## 2024-01-29 RX ADMIN — SERTRALINE HYDROCHLORIDE 25 MG: 25 TABLET ORAL at 20:12

## 2024-01-29 RX ADMIN — Medication 500 MG: at 08:36

## 2024-01-29 RX ADMIN — BACLOFEN 10 MG: 10 TABLET ORAL at 20:12

## 2024-01-29 RX ADMIN — Medication 500 MG: at 20:12

## 2024-01-29 RX ADMIN — INSULIN DETEMIR 50 UNITS: 100 INJECTION, SOLUTION SUBCUTANEOUS at 08:37

## 2024-01-29 RX ADMIN — MICONAZOLE NITRATE 1 APPLICATION: 2 POWDER TOPICAL at 08:39

## 2024-01-29 RX ADMIN — PREGABALIN 100 MG: 100 CAPSULE ORAL at 16:40

## 2024-01-29 RX ADMIN — PREGABALIN 100 MG: 100 CAPSULE ORAL at 20:12

## 2024-01-29 RX ADMIN — APIXABAN 5 MG: 5 TABLET, FILM COATED ORAL at 20:13

## 2024-01-29 RX ADMIN — OXYCODONE AND ACETAMINOPHEN 1 TABLET: 7.5; 325 TABLET ORAL at 03:34

## 2024-01-29 RX ADMIN — OXYCODONE AND ACETAMINOPHEN 1 TABLET: 7.5; 325 TABLET ORAL at 09:45

## 2024-01-29 RX ADMIN — SIMETHICONE 80 MG: 80 TABLET, CHEWABLE ORAL at 20:22

## 2024-01-29 RX ADMIN — INSULIN LISPRO 4 UNITS: 100 INJECTION, SOLUTION INTRAVENOUS; SUBCUTANEOUS at 12:22

## 2024-01-29 RX ADMIN — INSULIN DETEMIR 50 UNITS: 100 INJECTION, SOLUTION SUBCUTANEOUS at 20:22

## 2024-01-29 RX ADMIN — PREGABALIN 100 MG: 100 CAPSULE ORAL at 08:36

## 2024-01-29 RX ADMIN — FERROUS SULFATE TAB 325 MG (65 MG ELEMENTAL FE) 325 MG: 325 (65 FE) TAB at 08:36

## 2024-01-29 RX ADMIN — LISINOPRIL 2.5 MG: 2.5 TABLET ORAL at 08:36

## 2024-01-29 NOTE — PLAN OF CARE
Goal Outcome Evaluation:  Plan of Care Reviewed With: patient        Progress: no change  Outcome Evaluation: Patient alert and oriented x4. Given PRN Pepto Bismol for incontinent loose stool and PRN Percocet for c/o severe left hip pain at 0945. Percocet effective, Con't to have incontinent loose stools. Given PRN Baclofen for muscle spasms in left hip at 1138. Med effective. At 1540, patient called out reporting irreglar fast heart rate and pain to left shoulder. Patient assessed as follows: Heart rate regular and 64 bpm, /59, oxygen 96% on RA and he stated shoulder pain had disappeared. Provider notifed of patient report. 12 lead ECG ordered and resulted as sinus rhythm. Patient remained stable and no further c/o irregular heart rhythm or shoulder pain. voices needs. Con't current POC.

## 2024-01-29 NOTE — PLAN OF CARE
Goal Outcome Evaluation:  Plan of Care Reviewed With: patient        Progress: no change  Outcome Evaluation: Alert and oriented X 4. Vital signs stable. No significant change. Percocet administered X 2 for left hip pain. Baclofen administered for left shoulder pain. Pepto administered at HS for frequent BMs. Continue plan of care.

## 2024-01-29 NOTE — SIGNIFICANT NOTE
Wound Eval / Progress Noted     Angelica     Patient Name: Jose Shaikh  : 1960  MRN: [id number]  Today's Date: 2024                 Admit Date: 2023    Visit Dx:    ICD-10-CM ICD-9-CM   1. Difficulty in walking  R26.2 719.7         Debility    Ulcer of right foot    Ulcerated, foot, left, limited to breakdown of skin    Onychomycosis        Past Medical History:   Diagnosis Date    Absence of toe of right foot     Acute osteomyelitis of left calcaneus  2021    Anxiety and depression     Arthritis     Cancer     Chronic pain     STATES HIS PAIN IS 10/10 AAT    Claustrophobia     Corns and callus     Diabetic ulcer of left heel associated with type 2 DM 2021    Diabetic ulcer of left heel associated with type 2 DM 2021    Diabetic ulcer of right midfoot associated with type 2 DM 2021    Difficulty walking     Essential hypertension 2021    Hammertoe     Hyperlipidemia LDL goal <100 2021    Ingrown toenail     Obesity     Paroxysmal atrial fibrillation 2021    Polyneuropathy     Pressure ulcer, stage 1     Tinea unguium     Type 2 diabetes mellitus with polyneuropathy         Past Surgical History:   Procedure Laterality Date    CYST REMOVAL      center of back; benign    EYE SURGERY      INCISION AND DRAINAGE ABSCESS      back    INCISION AND DRAINAGE LEG Right 12/10/2021    Procedure: INCISION AND DRAINAGE LOWER EXTREMITY;  Surgeon: Ash Leyva DPM;  Location: [facility];  Service: Podiatry;  Laterality: Right;    OTHER SURGICAL HISTORY      Surgical clips left foot    TOE SURGERY Right     Removal of 5th toe    TRANS METATARSAL AMPUTATION Right 2021    Procedure: AMPUTATION TRANS METATARSAL;  Surgeon: Ash Leyva DPM;  Location: [facility] MAIN OR;  Service: Podiatry;  Laterality: Right;    VASCULAR SURGERY      WRIST SURGERY Left     repair of injury         Physical Assessment:  Wound 24 1343 Left anterior fourth  toe Abrasion (Active)   Wound Image   01/29/24 1158   Dressing Appearance dry;intact 01/29/24 1158   Closure None 01/29/24 1158   Base moist;red 01/29/24 1158   Periwound dry;intact;pink 01/29/24 1158   Periwound Temperature warm 01/29/24 1158   Periwound Skin Turgor soft 01/29/24 1158   Edges open 01/29/24 1158   Drainage Characteristics/Odor serosanguineous 01/29/24 1158   Drainage Amount scant 01/29/24 1158       Wound 01/22/24 1344 Right anterior foot Abrasion (Active)   Wound Image   01/29/24 1158   Dressing Appearance dry;intact 01/29/24 1158   Closure None 01/29/24 1158   Base moist;red 01/29/24 1158   Periwound dry;intact;pink 01/29/24 1158   Periwound Temperature warm 01/29/24 1158   Periwound Skin Turgor soft 01/29/24 1158   Edges open 01/29/24 1158   Drainage Characteristics/Odor serosanguineous 01/29/24 1158   Drainage Amount scant 01/29/24 1158      Wound Check / Follow-up: Patient seen today for wound follow-up. Patient remains on SNF. He is awake and alert at time of visit. Traumatic injuries remain to right distal foot and left anterior fourth toe. Moist, red, superficial tissue noted. Primary RN had just changed dressings. Peeled and resealed dressings. Recommending to continue current treatment with non-adherent petroleum based gauze and silicone border dressings. Patient declined assessment of buttocks / sacral area at this time.      Impression: Traumatic injuries to bilateral feet.       Short term goals: Regain skin integrity, skin protection, pressure reduction, moisture prevention, daily dressing changes, skin care.      Dottie Guevara RN    1/29/2024    12:23 EST

## 2024-01-30 LAB
GLUCOSE BLDC GLUCOMTR-MCNC: 110 MG/DL (ref 70–99)
GLUCOSE BLDC GLUCOMTR-MCNC: 114 MG/DL (ref 70–99)
GLUCOSE BLDC GLUCOMTR-MCNC: 118 MG/DL (ref 70–99)
GLUCOSE BLDC GLUCOMTR-MCNC: 94 MG/DL (ref 70–99)
QT INTERVAL: 396 MS
QTC INTERVAL: 439 MS

## 2024-01-30 PROCEDURE — 63710000001 INSULIN DETEMIR PER 5 UNITS: Performed by: PHYSICIAN ASSISTANT

## 2024-01-30 PROCEDURE — 82948 REAGENT STRIP/BLOOD GLUCOSE: CPT

## 2024-01-30 PROCEDURE — 82948 REAGENT STRIP/BLOOD GLUCOSE: CPT | Performed by: INTERNAL MEDICINE

## 2024-01-30 PROCEDURE — 99308 SBSQ NF CARE LOW MDM 20: CPT | Performed by: PHYSICIAN ASSISTANT

## 2024-01-30 RX ADMIN — OXYCODONE AND ACETAMINOPHEN 1 TABLET: 7.5; 325 TABLET ORAL at 04:30

## 2024-01-30 RX ADMIN — BACLOFEN 10 MG: 10 TABLET ORAL at 16:49

## 2024-01-30 RX ADMIN — INSULIN DETEMIR 40 UNITS: 100 INJECTION, SOLUTION SUBCUTANEOUS at 20:58

## 2024-01-30 RX ADMIN — SERTRALINE HYDROCHLORIDE 25 MG: 25 TABLET ORAL at 20:54

## 2024-01-30 RX ADMIN — Medication 500 MG: at 20:54

## 2024-01-30 RX ADMIN — MICONAZOLE NITRATE 1 APPLICATION: 2 POWDER TOPICAL at 13:48

## 2024-01-30 RX ADMIN — APIXABAN 5 MG: 5 TABLET, FILM COATED ORAL at 20:54

## 2024-01-30 RX ADMIN — PREGABALIN 100 MG: 100 CAPSULE ORAL at 15:00

## 2024-01-30 RX ADMIN — HYDROCORTISONE 1 APPLICATION: 10 CREAM TOPICAL at 20:57

## 2024-01-30 RX ADMIN — Medication 10 MG: at 20:54

## 2024-01-30 RX ADMIN — Medication 500 MG: at 08:21

## 2024-01-30 RX ADMIN — ATORVASTATIN CALCIUM 10 MG: 10 TABLET, FILM COATED ORAL at 20:54

## 2024-01-30 RX ADMIN — CYANOCOBALAMIN TAB 500 MCG 1000 MCG: 500 TAB at 08:22

## 2024-01-30 RX ADMIN — INSULIN DETEMIR 50 UNITS: 100 INJECTION, SOLUTION SUBCUTANEOUS at 08:22

## 2024-01-30 RX ADMIN — MICONAZOLE NITRATE 1 APPLICATION: 2 POWDER TOPICAL at 20:55

## 2024-01-30 RX ADMIN — LISINOPRIL 2.5 MG: 2.5 TABLET ORAL at 08:21

## 2024-01-30 RX ADMIN — PREGABALIN 100 MG: 100 CAPSULE ORAL at 21:00

## 2024-01-30 RX ADMIN — FERROUS SULFATE TAB 325 MG (65 MG ELEMENTAL FE) 325 MG: 325 (65 FE) TAB at 08:22

## 2024-01-30 RX ADMIN — BACLOFEN 10 MG: 10 TABLET ORAL at 06:13

## 2024-01-30 RX ADMIN — BISMUTH SUBSALICYLATE 524 MG: 262 TABLET, CHEWABLE ORAL at 14:59

## 2024-01-30 RX ADMIN — IBUPROFEN 400 MG: 400 TABLET ORAL at 01:56

## 2024-01-30 RX ADMIN — APIXABAN 5 MG: 5 TABLET, FILM COATED ORAL at 08:22

## 2024-01-30 RX ADMIN — EMPAGLIFLOZIN 10 MG: 10 TABLET, FILM COATED ORAL at 08:22

## 2024-01-30 RX ADMIN — OXYCODONE AND ACETAMINOPHEN 1 TABLET: 7.5; 325 TABLET ORAL at 20:54

## 2024-01-30 RX ADMIN — OXYCODONE AND ACETAMINOPHEN 1 TABLET: 7.5; 325 TABLET ORAL at 11:25

## 2024-01-30 RX ADMIN — PREGABALIN 100 MG: 100 CAPSULE ORAL at 08:22

## 2024-01-30 NOTE — PLAN OF CARE
Goal Outcome Evaluation:           Progress: no change     Rsd. Is alert and oriented x4, has rested intermittently in between care this shift.  Rsd. Continues to refuse turns, but does weight shift in bed with overhead trapeze bar.  Rsd. Also refused oral care this shift, states he does it after meals.  Rsd. Has not attempted to get out of bed this shift, is a 3+ max assist with transfers.  Is able to reposition self in bed with the use of overhead trapeze bar and assistance x1 for incontinence care and linen change.  Rsd. Incontinent of bowel x1 this shift.  Refuses bed alert.  Call light and personal items within reach, Rsd. Reminded to use call light for assistance, verbalizes understanding. Will continue to monitor and notify on-coming staff.  Current plan of care remains in place at this time.  Rsd. Medicated this shift with prn baclofen, ibuprofen, and Norco.  See emar.  Rsd. States his pain is Chronic and is never fully relieved but medications make it tolerable.

## 2024-01-30 NOTE — PROGRESS NOTES
[facility]   Hospitalist Progress Note  Date: 2024  Patient Name: Jose Shaikh  : 1960  MRN: [id number]  Date of admission: 2023      Subjective   Subjective     Chief Complaint: Weakness    Summary: Jose Shaikh is a 63 y.o. male  initially hospitalized on 9/10/2023, prolonged hospitalization for treatment of management of generalized weakness, deconditioning, with acute issues of diarrhea, C. difficile negative.  Diarrhea improved.  Had small hematoma after ambulating on his foot.  Unfortunately with exhaustive efforts to try to place this gentleman after 39 days of hospitalization, we are unable to find a facility that will accept him.  He has no further acute needs or requirements for inpatient monitoring and management, his labs and vitals are stable, he is tolerating oral intake, and has been refusing turns and repositionings and other interventions with nursing staff despite recommendations.  Patient discharged in hemodynamically stable condition on 10/19/2023 to follow-up with PCP within 1 week. Unfortunately we cannot solve all of his social issues during this hospitalization due to lack of resources and participation from the patient's perspective despite all our efforts.  Patient has a financial means of making arrangements at home.  Patient appealed his discharge.  Lost his appeal.  LCD: 10/20/23, PFL beginning: 10/21/23 @ 12pm.  Due to an inability to place the patient in a.m. nursing home he has been placed in our skilled nursing facility until arrangements can be made or until he is able to care for himself.      Interval Followup: 2024    Discussed with patient.  He is asking about medication that can help with his diabetes and also with weight loss.  He believes that weight loss will help him transfer better and may help with becoming more independent.  Remains medically stable.  No new complaints.  Discussed with SNF manager.  Will try Ozempic at starting dose 0.25 mg  weekly, if not cost prohibitive.      Review of systems: All systems reviewed and negative except what is noted above in interval follow-up   Objective   Objective     Vitals:   Temp:  [97.9 °F (36.6 °C)] 97.9 °F (36.6 °C)  Heart Rate:  [64-72] 72  Resp:  [16-18] 17  BP: (112-120)/(59-75) 117/64    Physical Exam   Constitutional: Awake alert oriented no acute distress  Respiratory: No wheeze  GI: Abdomen: Obese  Neuro/psych:  Calm mood.  No dysarthria.  Extremities: Some lower extremity edema noted    Result Review    Result Review:  I have personally reviewed the results from the time of this admission to 1/30/2024 12:23 EST and agree with these findings:  [x]  Laboratory  [x]  Microbiology  []  Radiology  []  EKG/Telemetry   []  Cardiology/Vascular   []  Pathology  []  Old records  []  Other:    Assessment & Plan   Assessment / Plan   Assessment:  Hospital-acquired weakness  Influenza A  C. difficile diarrhea treated  Generalized weakness  Deconditioning  DM (Kami Garcia and PCP)  HTN  PAF (on Eliquis; previously seen Dr. Duval)  Morbid obesity with BMI 44  Debility with wheelchair dependence  Hx of severe left hip pain  Severe degenerative joint disease of lower extremities  Hx L calcaneus osteomyelitis  Hx R transmetatarsal  Chronic bilateral foot wounds with right transmetatarsal amputation, healing by secondary intention (wound care clinic; podiatrist Gordon)  Thrombocytopenia, resolved      Plan:    Trial of weekly Ozempic, low-dose, if not cost prohibitive.  SNF manager checking on details.  Will need to monitor glucose closely and down titrate insulins etc if this gets approved.  Completed course of Tamiflu  Continue Lasix for 3 days then stop can spot dose as needed  Podiatry consultation for toenails.   A.m. chemistries  Continue basal insulin and SSI per protocol titrate as needed  Continue Eliquis for stroke prophylaxis  Continue daily PT and OT  Continue current pain meds   Had C. difficile diarrhea  few weeks ago completed course of oral vancomycin  Continue probiotics  Monitor hemodynamics and avoid hypotension/dehydration.  Continue other treatment as ordered  Discussed with RN    DVT prophylaxis:  Medical DVT prophylaxis orders are present.    CODE STATUS:   Code Status (Patient has no pulse and is not breathing): CPR (Attempt to Resuscitate)  Medical Interventions (Patient has pulse or is breathing): Full Support

## 2024-01-30 NOTE — PLAN OF CARE
Goal Outcome Evaluation:              Outcome Evaluation: Pt vss. Pt AOx4. Pt unable to transfer unless 3 or more can assist. Pt bedbound; refuses turns but weight shifts in the bed using trapeze bar. Pt got complete bed bath today. Nystatin powder administered to reddened folds. Calazime to reddened buttocks. Percocet given for pain prn per mar. Pt continues to have multiple loose stools. Pepto give prn per mar. Incontinence care completed when necessary. See care plan for further details.

## 2024-01-31 LAB
ALBUMIN SERPL-MCNC: 2.9 G/DL (ref 3.5–5.2)
ALBUMIN/GLOB SERPL: 1 G/DL
ALP SERPL-CCNC: 205 U/L (ref 39–117)
ALT SERPL W P-5'-P-CCNC: 41 U/L (ref 1–41)
ANION GAP SERPL CALCULATED.3IONS-SCNC: 7.5 MMOL/L (ref 5–15)
AST SERPL-CCNC: 52 U/L (ref 1–40)
BILIRUB SERPL-MCNC: 0.7 MG/DL (ref 0–1.2)
BUN SERPL-MCNC: 14 MG/DL (ref 8–23)
BUN/CREAT SERPL: 26.9 (ref 7–25)
CALCIUM SPEC-SCNC: 8.3 MG/DL (ref 8.6–10.5)
CHLORIDE SERPL-SCNC: 104 MMOL/L (ref 98–107)
CO2 SERPL-SCNC: 24.5 MMOL/L (ref 22–29)
CREAT SERPL-MCNC: 0.52 MG/DL (ref 0.76–1.27)
EGFRCR SERPLBLD CKD-EPI 2021: 111.9 ML/MIN/1.73
GLOBULIN UR ELPH-MCNC: 2.8 GM/DL
GLUCOSE BLDC GLUCOMTR-MCNC: 125 MG/DL (ref 70–99)
GLUCOSE BLDC GLUCOMTR-MCNC: 132 MG/DL (ref 70–99)
GLUCOSE BLDC GLUCOMTR-MCNC: 152 MG/DL (ref 70–99)
GLUCOSE BLDC GLUCOMTR-MCNC: 170 MG/DL (ref 70–99)
GLUCOSE BLDC GLUCOMTR-MCNC: 361 MG/DL (ref 70–99)
GLUCOSE SERPL-MCNC: 101 MG/DL (ref 65–99)
HBA1C MFR BLD: 5.5 % (ref 4.8–5.6)
POTASSIUM SERPL-SCNC: 4.1 MMOL/L (ref 3.5–5.2)
PROT SERPL-MCNC: 5.7 G/DL (ref 6–8.5)
SODIUM SERPL-SCNC: 136 MMOL/L (ref 136–145)

## 2024-01-31 PROCEDURE — 83036 HEMOGLOBIN GLYCOSYLATED A1C: CPT | Performed by: PHYSICIAN ASSISTANT

## 2024-01-31 PROCEDURE — 63710000001 INSULIN LISPRO (HUMAN) PER 5 UNITS: Performed by: INTERNAL MEDICINE

## 2024-01-31 PROCEDURE — 80053 COMPREHEN METABOLIC PANEL: CPT | Performed by: PHYSICIAN ASSISTANT

## 2024-01-31 PROCEDURE — 82948 REAGENT STRIP/BLOOD GLUCOSE: CPT | Performed by: INTERNAL MEDICINE

## 2024-01-31 PROCEDURE — 82948 REAGENT STRIP/BLOOD GLUCOSE: CPT

## 2024-01-31 PROCEDURE — 63710000001 INSULIN DETEMIR PER 5 UNITS: Performed by: PHYSICIAN ASSISTANT

## 2024-01-31 RX ORDER — HYDROCORTISONE 25 MG/G
CREAM TOPICAL 2 TIMES DAILY
Status: DISCONTINUED | OUTPATIENT
Start: 2024-01-31 | End: 2024-03-20

## 2024-01-31 RX ADMIN — FERROUS SULFATE TAB 325 MG (65 MG ELEMENTAL FE) 325 MG: 325 (65 FE) TAB at 08:16

## 2024-01-31 RX ADMIN — IBUPROFEN 400 MG: 400 TABLET ORAL at 03:23

## 2024-01-31 RX ADMIN — INSULIN DETEMIR 40 UNITS: 100 INJECTION, SOLUTION SUBCUTANEOUS at 20:19

## 2024-01-31 RX ADMIN — EMPAGLIFLOZIN 10 MG: 10 TABLET, FILM COATED ORAL at 08:16

## 2024-01-31 RX ADMIN — INSULIN LISPRO 4 UNITS: 100 INJECTION, SOLUTION INTRAVENOUS; SUBCUTANEOUS at 08:18

## 2024-01-31 RX ADMIN — PREGABALIN 100 MG: 100 CAPSULE ORAL at 20:20

## 2024-01-31 RX ADMIN — Medication 500 MG: at 20:20

## 2024-01-31 RX ADMIN — IBUPROFEN 400 MG: 400 TABLET ORAL at 16:57

## 2024-01-31 RX ADMIN — PREGABALIN 100 MG: 100 CAPSULE ORAL at 16:49

## 2024-01-31 RX ADMIN — HYDROCORTISONE 1 APPLICATION: 10 CREAM TOPICAL at 08:17

## 2024-01-31 RX ADMIN — MICONAZOLE NITRATE 1 APPLICATION: 2 POWDER TOPICAL at 08:18

## 2024-01-31 RX ADMIN — INSULIN LISPRO 4 UNITS: 100 INJECTION, SOLUTION INTRAVENOUS; SUBCUTANEOUS at 11:56

## 2024-01-31 RX ADMIN — MICONAZOLE NITRATE 1 APPLICATION: 2 POWDER TOPICAL at 20:22

## 2024-01-31 RX ADMIN — Medication 500 MG: at 08:17

## 2024-01-31 RX ADMIN — LISINOPRIL 2.5 MG: 2.5 TABLET ORAL at 08:16

## 2024-01-31 RX ADMIN — BACLOFEN 10 MG: 10 TABLET ORAL at 02:02

## 2024-01-31 RX ADMIN — PREGABALIN 100 MG: 100 CAPSULE ORAL at 08:16

## 2024-01-31 RX ADMIN — OXYCODONE AND ACETAMINOPHEN 1 TABLET: 7.5; 325 TABLET ORAL at 20:20

## 2024-01-31 RX ADMIN — BACLOFEN 10 MG: 10 TABLET ORAL at 15:47

## 2024-01-31 RX ADMIN — OXYCODONE AND ACETAMINOPHEN 1 TABLET: 7.5; 325 TABLET ORAL at 08:16

## 2024-01-31 RX ADMIN — INSULIN LISPRO 20 UNITS: 100 INJECTION, SOLUTION INTRAVENOUS; SUBCUTANEOUS at 20:37

## 2024-01-31 RX ADMIN — HYDROCORTISONE 1 APPLICATION: 10 CREAM TOPICAL at 20:21

## 2024-01-31 RX ADMIN — INSULIN DETEMIR 40 UNITS: 100 INJECTION, SOLUTION SUBCUTANEOUS at 08:18

## 2024-01-31 RX ADMIN — CYANOCOBALAMIN TAB 500 MCG 1000 MCG: 500 TAB at 08:16

## 2024-01-31 RX ADMIN — SERTRALINE HYDROCHLORIDE 25 MG: 25 TABLET ORAL at 20:20

## 2024-01-31 RX ADMIN — ATORVASTATIN CALCIUM 10 MG: 10 TABLET, FILM COATED ORAL at 20:20

## 2024-01-31 RX ADMIN — Medication 10 MG: at 20:20

## 2024-01-31 RX ADMIN — BISMUTH SUBSALICYLATE 524 MG: 262 TABLET, CHEWABLE ORAL at 11:38

## 2024-01-31 RX ADMIN — APIXABAN 5 MG: 5 TABLET, FILM COATED ORAL at 20:20

## 2024-01-31 RX ADMIN — APIXABAN 5 MG: 5 TABLET, FILM COATED ORAL at 08:16

## 2024-01-31 NOTE — PLAN OF CARE
Goal Outcome Evaluation:  Plan of Care Reviewed With: patient        Progress: no change  Outcome Evaluation: Patient is alert and oriented x4. Has been short with staff today. Refused bath. Refused oral care. Multiple fecal incontinence episodes today. Refused to use bed pan. Refused turns multiple times. Educated on skin care. Given PRN Percocet at 0816 for c/o left hip pain 10/10. Med somewhat effective. Given PRN Pepto bismol for loose stools. Med not effective. Given PRN baclofen at 1547 for c/o left hip muscle spasms. Med effective. Given requested PRN Ibuprofen at 1657 for c/o left shoulder and elbow rated 10/10 and left arm elevated on pillows. Med not yet effective. Voices needs. Con't current POC.

## 2024-02-01 LAB
GLUCOSE BLDC GLUCOMTR-MCNC: 100 MG/DL (ref 70–99)
GLUCOSE BLDC GLUCOMTR-MCNC: 133 MG/DL (ref 70–99)
GLUCOSE BLDC GLUCOMTR-MCNC: 158 MG/DL (ref 70–99)
GLUCOSE BLDC GLUCOMTR-MCNC: 97 MG/DL (ref 70–99)

## 2024-02-01 PROCEDURE — 63710000001 INSULIN LISPRO (HUMAN) PER 5 UNITS: Performed by: INTERNAL MEDICINE

## 2024-02-01 PROCEDURE — 82948 REAGENT STRIP/BLOOD GLUCOSE: CPT

## 2024-02-01 PROCEDURE — 82948 REAGENT STRIP/BLOOD GLUCOSE: CPT | Performed by: INTERNAL MEDICINE

## 2024-02-01 PROCEDURE — 63710000001 INSULIN DETEMIR PER 5 UNITS: Performed by: PHYSICIAN ASSISTANT

## 2024-02-01 RX ADMIN — ATORVASTATIN CALCIUM 10 MG: 10 TABLET, FILM COATED ORAL at 20:56

## 2024-02-01 RX ADMIN — SERTRALINE HYDROCHLORIDE 25 MG: 25 TABLET ORAL at 20:59

## 2024-02-01 RX ADMIN — INSULIN DETEMIR 40 UNITS: 100 INJECTION, SOLUTION SUBCUTANEOUS at 20:54

## 2024-02-01 RX ADMIN — EMPAGLIFLOZIN 10 MG: 10 TABLET, FILM COATED ORAL at 08:08

## 2024-02-01 RX ADMIN — INSULIN DETEMIR 40 UNITS: 100 INJECTION, SOLUTION SUBCUTANEOUS at 08:08

## 2024-02-01 RX ADMIN — Medication 500 MG: at 08:08

## 2024-02-01 RX ADMIN — IBUPROFEN 400 MG: 400 TABLET ORAL at 22:04

## 2024-02-01 RX ADMIN — Medication 500 MG: at 20:55

## 2024-02-01 RX ADMIN — CYANOCOBALAMIN TAB 500 MCG 1000 MCG: 500 TAB at 08:08

## 2024-02-01 RX ADMIN — Medication 10 MG: at 20:55

## 2024-02-01 RX ADMIN — OXYCODONE AND ACETAMINOPHEN 1 TABLET: 7.5; 325 TABLET ORAL at 20:55

## 2024-02-01 RX ADMIN — HYDROCORTISONE 1 APPLICATION: 25 CREAM TOPICAL at 20:57

## 2024-02-01 RX ADMIN — PREGABALIN 100 MG: 100 CAPSULE ORAL at 20:55

## 2024-02-01 RX ADMIN — LISINOPRIL 2.5 MG: 2.5 TABLET ORAL at 08:08

## 2024-02-01 RX ADMIN — PREGABALIN 100 MG: 100 CAPSULE ORAL at 17:04

## 2024-02-01 RX ADMIN — BACLOFEN 10 MG: 10 TABLET ORAL at 04:34

## 2024-02-01 RX ADMIN — HYDROCORTISONE: 25 CREAM TOPICAL at 08:13

## 2024-02-01 RX ADMIN — OXYCODONE AND ACETAMINOPHEN 1 TABLET: 7.5; 325 TABLET ORAL at 06:36

## 2024-02-01 RX ADMIN — OXYCODONE AND ACETAMINOPHEN 1 TABLET: 7.5; 325 TABLET ORAL at 13:37

## 2024-02-01 RX ADMIN — APIXABAN 5 MG: 5 TABLET, FILM COATED ORAL at 08:08

## 2024-02-01 RX ADMIN — FERROUS SULFATE TAB 325 MG (65 MG ELEMENTAL FE) 325 MG: 325 (65 FE) TAB at 08:08

## 2024-02-01 RX ADMIN — IBUPROFEN 400 MG: 400 TABLET ORAL at 09:00

## 2024-02-01 RX ADMIN — PREGABALIN 100 MG: 100 CAPSULE ORAL at 08:08

## 2024-02-01 RX ADMIN — INSULIN LISPRO 4 UNITS: 100 INJECTION, SOLUTION INTRAVENOUS; SUBCUTANEOUS at 20:54

## 2024-02-01 RX ADMIN — APIXABAN 5 MG: 5 TABLET, FILM COATED ORAL at 20:55

## 2024-02-01 NOTE — PLAN OF CARE
Goal Outcome Evaluation:           Progress: no change  Outcome Evaluation: Patient is alert and oriented x4, able to make needs known to staff.  Has been medicated x1 this shift with prn oxycodone and baclofen, see emar.  Rsd. states medication effective.  Refuses to use bedpan this shift and has been incontinent of bowels.  Urinal remains at bedside.  Activity encouraged this shift, Rsd. has attempted to pull self up in bed with overhead trapeze bar.  Has not ambulated or gotten out of bed this shift, Refuses to be turned, but does shift weight frequently while in bed.  Refuses bed alert.  Bowels formed this shift.  Call light and personal items within reach, Rsd. reminded to use call light for assistance, verbalizes understanding.  WIll continue to monitor and notify on-coming staff.  Current plan of care remains in place at this time.

## 2024-02-02 LAB
GLUCOSE BLDC GLUCOMTR-MCNC: 101 MG/DL (ref 70–99)
GLUCOSE BLDC GLUCOMTR-MCNC: 122 MG/DL (ref 70–99)
GLUCOSE BLDC GLUCOMTR-MCNC: 127 MG/DL (ref 70–99)
GLUCOSE BLDC GLUCOMTR-MCNC: 130 MG/DL (ref 70–99)

## 2024-02-02 PROCEDURE — 25010000002 COVID-19 MRNA VAC-TRIS(PFIZER) 30 MCG/0.3ML SUSPENSION PREFILLED SYRINGE: Performed by: INTERNAL MEDICINE

## 2024-02-02 PROCEDURE — 91320 SARSCV2 VAC 30MCG TRS-SUC IM: CPT | Performed by: INTERNAL MEDICINE

## 2024-02-02 PROCEDURE — 82948 REAGENT STRIP/BLOOD GLUCOSE: CPT | Performed by: INTERNAL MEDICINE

## 2024-02-02 PROCEDURE — 63710000001 INSULIN DETEMIR PER 5 UNITS: Performed by: PHYSICIAN ASSISTANT

## 2024-02-02 PROCEDURE — 82948 REAGENT STRIP/BLOOD GLUCOSE: CPT

## 2024-02-02 RX ORDER — SACCHAROMYCES BOULARDII 250 MG
500 CAPSULE ORAL 2 TIMES DAILY
Status: DISCONTINUED | OUTPATIENT
Start: 2024-02-03 | End: 2024-02-02

## 2024-02-02 RX ORDER — SACCHAROMYCES BOULARDII 250 MG
500 CAPSULE ORAL 2 TIMES DAILY
Status: DISPENSED | OUTPATIENT
Start: 2024-02-03

## 2024-02-02 RX ADMIN — EMPAGLIFLOZIN 10 MG: 10 TABLET, FILM COATED ORAL at 08:33

## 2024-02-02 RX ADMIN — FERROUS SULFATE TAB 325 MG (65 MG ELEMENTAL FE) 325 MG: 325 (65 FE) TAB at 08:33

## 2024-02-02 RX ADMIN — HYDROCORTISONE: 25 CREAM TOPICAL at 21:27

## 2024-02-02 RX ADMIN — COVID-19 VACCINE, MRNA 0.3 ML: 0.04 INJECTION, SUSPENSION INTRAMUSCULAR at 12:16

## 2024-02-02 RX ADMIN — BISMUTH SUBSALICYLATE 524 MG: 262 TABLET, CHEWABLE ORAL at 06:09

## 2024-02-02 RX ADMIN — PREGABALIN 100 MG: 100 CAPSULE ORAL at 17:00

## 2024-02-02 RX ADMIN — APIXABAN 5 MG: 5 TABLET, FILM COATED ORAL at 08:33

## 2024-02-02 RX ADMIN — OXYCODONE AND ACETAMINOPHEN 1 TABLET: 7.5; 325 TABLET ORAL at 21:22

## 2024-02-02 RX ADMIN — APIXABAN 5 MG: 5 TABLET, FILM COATED ORAL at 21:22

## 2024-02-02 RX ADMIN — INSULIN DETEMIR 40 UNITS: 100 INJECTION, SOLUTION SUBCUTANEOUS at 08:33

## 2024-02-02 RX ADMIN — BACLOFEN 10 MG: 10 TABLET ORAL at 02:32

## 2024-02-02 RX ADMIN — ATORVASTATIN CALCIUM 10 MG: 10 TABLET, FILM COATED ORAL at 21:22

## 2024-02-02 RX ADMIN — OXYCODONE AND ACETAMINOPHEN 1 TABLET: 7.5; 325 TABLET ORAL at 08:32

## 2024-02-02 RX ADMIN — Medication 10 MG: at 21:21

## 2024-02-02 RX ADMIN — INSULIN DETEMIR 40 UNITS: 100 INJECTION, SOLUTION SUBCUTANEOUS at 21:21

## 2024-02-02 RX ADMIN — PREGABALIN 100 MG: 100 CAPSULE ORAL at 08:33

## 2024-02-02 RX ADMIN — IBUPROFEN 400 MG: 400 TABLET ORAL at 13:13

## 2024-02-02 RX ADMIN — Medication 500 MG: at 10:00

## 2024-02-02 RX ADMIN — SERTRALINE HYDROCHLORIDE 25 MG: 25 TABLET ORAL at 21:22

## 2024-02-02 RX ADMIN — CYANOCOBALAMIN TAB 500 MCG 1000 MCG: 500 TAB at 08:33

## 2024-02-02 RX ADMIN — LISINOPRIL 2.5 MG: 2.5 TABLET ORAL at 08:32

## 2024-02-02 RX ADMIN — PREGABALIN 100 MG: 100 CAPSULE ORAL at 21:22

## 2024-02-02 NOTE — PLAN OF CARE
Goal Outcome Evaluation:  Plan of Care Reviewed With: patient        Progress: no change  Outcome Evaluation: Patient had no significant changes this shift. Given PRN Percocet at 0832 for severe left hip pain and ibuprofen at 1313 for left shoulder pain. Meds effective. Refused turns multiple times. Refused oral care. Voices needs. Con't current POC.

## 2024-02-02 NOTE — SIGNIFICANT NOTE
Wound Eval / Progress Noted    KEO Dominguez     Patient Name: Jose Shaikh  : 1960  MRN: [id number]  Today's Date: 2024                 Admit Date: 2023    Visit Dx:    ICD-10-CM ICD-9-CM   1. Difficulty in walking  R26.2 719.7   2. Type 2 diabetes mellitus with other specified complication, unspecified whether long term insulin use  E11.69 250.80         Debility    Ulcer of right foot    Ulcerated, foot, left, limited to breakdown of skin    Onychomycosis        Past Medical History:   Diagnosis Date    Absence of toe of right foot     Acute osteomyelitis of left calcaneus  2021    Anxiety and depression     Arthritis     Cancer     Chronic pain     STATES HIS PAIN IS 10/10 AAT    Claustrophobia     Corns and callus     Diabetic ulcer of left heel associated with type 2 DM 2021    Diabetic ulcer of left heel associated with type 2 DM 2021    Diabetic ulcer of right midfoot associated with type 2 DM 2021    Difficulty walking     Essential hypertension 2021    Hammertoe     Hyperlipidemia LDL goal <100 2021    Ingrown toenail     Obesity     Paroxysmal atrial fibrillation 2021    Polyneuropathy     Pressure ulcer, stage 1     Tinea unguium     Type 2 diabetes mellitus with polyneuropathy         Past Surgical History:   Procedure Laterality Date    CYST REMOVAL      center of back; benign    EYE SURGERY      INCISION AND DRAINAGE ABSCESS      back    INCISION AND DRAINAGE LEG Right 12/10/2021    Procedure: INCISION AND DRAINAGE LOWER EXTREMITY;  Surgeon: Ash Leyva DPM;  Location: [facility] MAIN OR;  Service: Podiatry;  Laterality: Right;    OTHER SURGICAL HISTORY      Surgical clips left foot    TOE SURGERY Right     Removal of 5th toe    TRANS METATARSAL AMPUTATION Right 2021    Procedure: AMPUTATION TRANS METATARSAL;  Surgeon: Ash Leyva DPM;  Location: [facility] MAIN OR;  Service: Podiatry;  Laterality: Right;    VASCULAR  SURGERY      WRIST SURGERY Left     repair of injury         Physical Assessment:  Wound 01/22/24 1343 Left anterior fourth toe Abrasion (Active)   Wound Image   02/02/24 1024   Dressing Appearance intact;dried drainage 02/02/24 1024   Closure None 02/02/24 1024   Base moist;slough;yellow 02/02/24 1024   Periwound intact;dry;redness 02/02/24 1024   Periwound Temperature cool 02/02/24 1024   Periwound Skin Turgor soft 02/02/24 1024   Edges open 02/02/24 1024   Wound Length (cm) 1.3 cm 02/02/24 1024   Wound Width (cm) 0.7 cm 02/02/24 1024   Wound Depth (cm) 0.1 cm 02/02/24 1024   Wound Surface Area (cm^2) 0.91 cm^2 02/02/24 1024   Wound Volume (cm^3) 0.091 cm^3 02/02/24 1024   Drainage Characteristics/Odor serous 02/02/24 1024   Drainage Amount scant 02/02/24 1024   Care, Wound cleansed with;sterile normal saline 02/02/24 1024   Dressing Care dressing applied;other (see comments) 02/02/24 1024   Periwound Care absorptive dressing applied 02/02/24 1024       Wound 01/22/24 1344 Right anterior foot Abrasion (Active)   Wound Image   02/02/24 1024   Dressing Appearance intact;moist drainage 02/02/24 1024   Closure None 02/02/24 1024   Base moist;pink 02/02/24 1024   Periwound intact;dry;redness 02/02/24 1024   Periwound Temperature cool 02/02/24 1024   Periwound Skin Turgor soft 02/02/24 1024   Edges open 02/02/24 1024   Wound Length (cm) 0.4 cm 02/02/24 1024   Wound Width (cm) 0.4 cm 02/02/24 1024   Wound Depth (cm) 0 cm 02/02/24 1024   Wound Surface Area (cm^2) 0.16 cm^2 02/02/24 1024   Wound Volume (cm^3) 0 cm^3 02/02/24 1024   Drainage Characteristics/Odor serous 02/02/24 1024   Drainage Amount scant 02/02/24 1024   Care, Wound cleansed with;sterile normal saline 02/02/24 1024   Dressing Care dressing applied;border dressing;petroleum-based;silicone 02/02/24 1024   Periwound Care absorptive dressing applied 02/02/24 1024       Wound 02/01/24 1254 Left distal calf (Active)   Wound Image   02/01/24 1254   Dressing  Appearance open to air 02/02/24 1024   Closure None 02/02/24 1024   Base dry;red;other (see comments) 02/02/24 1024   Periwound intact;dry;redness 02/02/24 1024   Periwound Temperature warm 02/02/24 1024   Periwound Skin Turgor soft 02/02/24 1024   Edges rolled/closed 02/02/24 1024   Drainage Amount none 02/02/24 1024   Care, Wound cleansed with;sterile normal saline 02/02/24 1024   Dressing Care open to air 02/02/24 1024   Periwound Care dry periwound area maintained 02/02/24 1024        Wound Check / Follow-up:  Patient seen today for weekly wound care follow up. Patient remains on SNF at this time. Patient states that his therapy has stopped due to him reaching his max potential.     Patient left 4th toe wound remains open and is with a moist, yellow wound bed. Patient's current wound orders are for petroleum impregnated gauze to wound bed. Due to the yellow slough in wound bed, new recommendations are for the application of medihoney to the wound bed and cover with a Band-Aid dressing and change every other day.     Patient's right anterior foot wound is open, with a pink moist wound bed. Wound is draining a scant amount of yellow serous fluid. Recommending to continue current wound care orders of petroleum impregnated gauze to wound and cover with a silicone border dressing.     Patient' left distal calf has an area of red, dry flaky skin. Skin remains intact and no drainage is noted at this time. Recommending to provide a layer of purple top moisturizer to patient's BLE BID to rehydrate and prevent skin breakdown.     Patient is with redness and MASD to his skin folds. Recommending to keep skin clean and dry and apply a light dusting of corn starch powder to skin folds and moisture wicking pads BID.     Patient bilateral gluteal aspects were dry, intact, blanchable and pink. Recommending to continue q2h turns and application of a thin layer of blue top barrier ointment to bilateral gluteal aspects BID.      Impression: Left 4th toe wound, right anterior foot wound, BLE dry flaky skin     Short term goals: Regain skin integrity, moisture prevention, skin care, skin protection, pressure reduction, every other day wound dressing changes, and daily wound dressing changes.     Candy Garcia RN    2/2/2024    12:14 EST

## 2024-02-03 LAB
GLUCOSE BLDC GLUCOMTR-MCNC: 110 MG/DL (ref 70–99)
GLUCOSE BLDC GLUCOMTR-MCNC: 119 MG/DL (ref 70–99)
GLUCOSE BLDC GLUCOMTR-MCNC: 128 MG/DL (ref 70–99)
GLUCOSE BLDC GLUCOMTR-MCNC: 190 MG/DL (ref 70–99)

## 2024-02-03 PROCEDURE — 82948 REAGENT STRIP/BLOOD GLUCOSE: CPT | Performed by: INTERNAL MEDICINE

## 2024-02-03 PROCEDURE — 63710000001 INSULIN DETEMIR PER 5 UNITS: Performed by: PHYSICIAN ASSISTANT

## 2024-02-03 PROCEDURE — 82948 REAGENT STRIP/BLOOD GLUCOSE: CPT

## 2024-02-03 PROCEDURE — 63710000001 INSULIN LISPRO (HUMAN) PER 5 UNITS: Performed by: INTERNAL MEDICINE

## 2024-02-03 RX ADMIN — OXYCODONE AND ACETAMINOPHEN 1 TABLET: 7.5; 325 TABLET ORAL at 04:35

## 2024-02-03 RX ADMIN — HYDROCORTISONE: 25 CREAM TOPICAL at 21:05

## 2024-02-03 RX ADMIN — OXYCODONE AND ACETAMINOPHEN 1 TABLET: 7.5; 325 TABLET ORAL at 11:46

## 2024-02-03 RX ADMIN — INSULIN DETEMIR 40 UNITS: 100 INJECTION, SOLUTION SUBCUTANEOUS at 21:05

## 2024-02-03 RX ADMIN — Medication 10 MG: at 21:03

## 2024-02-03 RX ADMIN — BACLOFEN 10 MG: 10 TABLET ORAL at 15:25

## 2024-02-03 RX ADMIN — OXYCODONE AND ACETAMINOPHEN 1 TABLET: 7.5; 325 TABLET ORAL at 19:21

## 2024-02-03 RX ADMIN — ATORVASTATIN CALCIUM 10 MG: 10 TABLET, FILM COATED ORAL at 21:04

## 2024-02-03 RX ADMIN — PREGABALIN 100 MG: 100 CAPSULE ORAL at 21:04

## 2024-02-03 RX ADMIN — APIXABAN 5 MG: 5 TABLET, FILM COATED ORAL at 21:04

## 2024-02-03 RX ADMIN — PREGABALIN 100 MG: 100 CAPSULE ORAL at 08:34

## 2024-02-03 RX ADMIN — LISINOPRIL 2.5 MG: 2.5 TABLET ORAL at 08:34

## 2024-02-03 RX ADMIN — APIXABAN 5 MG: 5 TABLET, FILM COATED ORAL at 08:34

## 2024-02-03 RX ADMIN — SERTRALINE HYDROCHLORIDE 25 MG: 25 TABLET ORAL at 21:04

## 2024-02-03 RX ADMIN — PREGABALIN 100 MG: 100 CAPSULE ORAL at 15:25

## 2024-02-03 RX ADMIN — EMPAGLIFLOZIN 10 MG: 10 TABLET, FILM COATED ORAL at 08:34

## 2024-02-03 RX ADMIN — IBUPROFEN 400 MG: 400 TABLET ORAL at 03:39

## 2024-02-03 RX ADMIN — BACLOFEN 10 MG: 10 TABLET ORAL at 02:54

## 2024-02-03 RX ADMIN — INSULIN LISPRO 4 UNITS: 100 INJECTION, SOLUTION INTRAVENOUS; SUBCUTANEOUS at 21:04

## 2024-02-03 RX ADMIN — FERROUS SULFATE TAB 325 MG (65 MG ELEMENTAL FE) 325 MG: 325 (65 FE) TAB at 08:34

## 2024-02-03 RX ADMIN — Medication 500 MG: at 21:03

## 2024-02-03 RX ADMIN — CYANOCOBALAMIN TAB 500 MCG 1000 MCG: 500 TAB at 08:34

## 2024-02-03 RX ADMIN — Medication 500 MG: at 14:43

## 2024-02-03 RX ADMIN — INSULIN DETEMIR 40 UNITS: 100 INJECTION, SOLUTION SUBCUTANEOUS at 08:33

## 2024-02-03 NOTE — PLAN OF CARE
Goal Outcome Evaluation:         Pt AAOX4, able to make wants and needs known, patient refuses to turn and to perform self hygiene.Incontinent of stool x5, small amount each time, uses the urinal. Pt has taken two dose of percocet this shift for left hip pain, one dose of ibuprofen for shoulder pain, and baclofen for muscle spasms- pt stated medications are effective in relieving his pain. See emar.

## 2024-02-04 LAB
GLUCOSE BLDC GLUCOMTR-MCNC: 110 MG/DL (ref 70–99)
GLUCOSE BLDC GLUCOMTR-MCNC: 136 MG/DL (ref 70–99)
SARS-COV-2 RNA RESP QL NAA+PROBE: NOT DETECTED

## 2024-02-04 PROCEDURE — 82948 REAGENT STRIP/BLOOD GLUCOSE: CPT

## 2024-02-04 PROCEDURE — 87635 SARS-COV-2 COVID-19 AMP PRB: CPT | Performed by: INTERNAL MEDICINE

## 2024-02-04 PROCEDURE — 82948 REAGENT STRIP/BLOOD GLUCOSE: CPT | Performed by: INTERNAL MEDICINE

## 2024-02-04 PROCEDURE — 63710000001 INSULIN DETEMIR PER 5 UNITS: Performed by: PHYSICIAN ASSISTANT

## 2024-02-04 RX ADMIN — CYANOCOBALAMIN TAB 500 MCG 1000 MCG: 500 TAB at 09:54

## 2024-02-04 RX ADMIN — Medication 500 MG: at 21:04

## 2024-02-04 RX ADMIN — Medication 10 MG: at 21:05

## 2024-02-04 RX ADMIN — OXYCODONE AND ACETAMINOPHEN 1 TABLET: 7.5; 325 TABLET ORAL at 21:04

## 2024-02-04 RX ADMIN — BACLOFEN 10 MG: 10 TABLET ORAL at 00:58

## 2024-02-04 RX ADMIN — PREGABALIN 100 MG: 100 CAPSULE ORAL at 21:05

## 2024-02-04 RX ADMIN — LISINOPRIL 2.5 MG: 2.5 TABLET ORAL at 09:52

## 2024-02-04 RX ADMIN — HYDROCORTISONE: 25 CREAM TOPICAL at 09:58

## 2024-02-04 RX ADMIN — PREGABALIN 100 MG: 100 CAPSULE ORAL at 09:54

## 2024-02-04 RX ADMIN — PREGABALIN 100 MG: 100 CAPSULE ORAL at 17:35

## 2024-02-04 RX ADMIN — APIXABAN 5 MG: 5 TABLET, FILM COATED ORAL at 09:52

## 2024-02-04 RX ADMIN — OXYCODONE AND ACETAMINOPHEN 1 TABLET: 7.5; 325 TABLET ORAL at 02:30

## 2024-02-04 RX ADMIN — IBUPROFEN 400 MG: 400 TABLET ORAL at 01:32

## 2024-02-04 RX ADMIN — ATORVASTATIN CALCIUM 10 MG: 10 TABLET, FILM COATED ORAL at 21:05

## 2024-02-04 RX ADMIN — OXYCODONE AND ACETAMINOPHEN 1 TABLET: 7.5; 325 TABLET ORAL at 10:01

## 2024-02-04 RX ADMIN — Medication 500 MG: at 09:53

## 2024-02-04 RX ADMIN — INSULIN DETEMIR 40 UNITS: 100 INJECTION, SOLUTION SUBCUTANEOUS at 09:54

## 2024-02-04 RX ADMIN — SERTRALINE HYDROCHLORIDE 25 MG: 25 TABLET ORAL at 21:47

## 2024-02-04 RX ADMIN — FERROUS SULFATE TAB 325 MG (65 MG ELEMENTAL FE) 325 MG: 325 (65 FE) TAB at 09:53

## 2024-02-04 RX ADMIN — EMPAGLIFLOZIN 10 MG: 10 TABLET, FILM COATED ORAL at 09:54

## 2024-02-04 RX ADMIN — INSULIN DETEMIR 40 UNITS: 100 INJECTION, SOLUTION SUBCUTANEOUS at 21:04

## 2024-02-04 RX ADMIN — APIXABAN 5 MG: 5 TABLET, FILM COATED ORAL at 21:05

## 2024-02-04 NOTE — PLAN OF CARE
Goal Outcome Evaluation:  Plan of Care Reviewed With: patient        Progress: improving  Outcome Evaluation: Pt is AO x 4 and VSS. Pt continues to refuse turns unless incontinence care is being done. he c/o pain of a 10 in his left hip and was medicated with prn meds 4x with minimal relief. Blood sugar check was 190. Will continue with his plan of care. Call light within reach.

## 2024-02-04 NOTE — PLAN OF CARE
"Goal Outcome Evaluation:  Plan of Care Reviewed With: patient        Progress: no change  Outcome Evaluation: Patient is alert and oriented. More pleasant with staff today, but still refuses turns unless incontinence care is being done. Educated given on need for repositioning and patient replied \"I don't care.\" Refused oral care as well. Education given. C/O severe pain to left hip at 1146 and was given Percocet which was effective. Given PRN baclofen at 1525 for c/o left hip muscle spasms. Med effective. Voices needs. Con't current POC.                               "

## 2024-02-05 LAB
GLUCOSE BLDC GLUCOMTR-MCNC: 104 MG/DL (ref 70–99)
GLUCOSE BLDC GLUCOMTR-MCNC: 117 MG/DL (ref 70–99)
GLUCOSE BLDC GLUCOMTR-MCNC: 121 MG/DL (ref 70–99)
GLUCOSE BLDC GLUCOMTR-MCNC: 158 MG/DL (ref 70–99)
GLUCOSE BLDC GLUCOMTR-MCNC: 82 MG/DL (ref 70–99)

## 2024-02-05 PROCEDURE — 63710000001 INSULIN DETEMIR PER 5 UNITS: Performed by: PHYSICIAN ASSISTANT

## 2024-02-05 PROCEDURE — 82948 REAGENT STRIP/BLOOD GLUCOSE: CPT

## 2024-02-05 PROCEDURE — 63710000001 INSULIN LISPRO (HUMAN) PER 5 UNITS: Performed by: INTERNAL MEDICINE

## 2024-02-05 RX ORDER — BISMUTH SUBSALICYLATE 262 MG/1
524 TABLET, CHEWABLE ORAL 3 TIMES DAILY PRN
Status: DISPENSED | OUTPATIENT
Start: 2024-02-05

## 2024-02-05 RX ADMIN — LISINOPRIL 2.5 MG: 2.5 TABLET ORAL at 08:03

## 2024-02-05 RX ADMIN — INSULIN DETEMIR 40 UNITS: 100 INJECTION, SOLUTION SUBCUTANEOUS at 08:03

## 2024-02-05 RX ADMIN — Medication 500 MG: at 08:03

## 2024-02-05 RX ADMIN — BISMUTH SUBSALICYLATE 524 MG: 262 TABLET, CHEWABLE ORAL at 05:34

## 2024-02-05 RX ADMIN — INSULIN LISPRO 4 UNITS: 100 INJECTION, SOLUTION INTRAVENOUS; SUBCUTANEOUS at 08:03

## 2024-02-05 RX ADMIN — EMPAGLIFLOZIN 10 MG: 10 TABLET, FILM COATED ORAL at 08:03

## 2024-02-05 RX ADMIN — BACLOFEN 10 MG: 10 TABLET ORAL at 02:35

## 2024-02-05 RX ADMIN — Medication 10 MG: at 20:24

## 2024-02-05 RX ADMIN — PREGABALIN 100 MG: 100 CAPSULE ORAL at 08:03

## 2024-02-05 RX ADMIN — IBUPROFEN 400 MG: 400 TABLET ORAL at 15:34

## 2024-02-05 RX ADMIN — PREGABALIN 100 MG: 100 CAPSULE ORAL at 20:25

## 2024-02-05 RX ADMIN — OXYCODONE AND ACETAMINOPHEN 1 TABLET: 7.5; 325 TABLET ORAL at 04:07

## 2024-02-05 RX ADMIN — APIXABAN 5 MG: 5 TABLET, FILM COATED ORAL at 08:03

## 2024-02-05 RX ADMIN — OXYCODONE AND ACETAMINOPHEN 1 TABLET: 7.5; 325 TABLET ORAL at 10:03

## 2024-02-05 RX ADMIN — APIXABAN 5 MG: 5 TABLET, FILM COATED ORAL at 20:24

## 2024-02-05 RX ADMIN — ATORVASTATIN CALCIUM 10 MG: 10 TABLET, FILM COATED ORAL at 20:24

## 2024-02-05 RX ADMIN — INSULIN DETEMIR 40 UNITS: 100 INJECTION, SOLUTION SUBCUTANEOUS at 20:26

## 2024-02-05 RX ADMIN — HYDROCORTISONE: 25 CREAM TOPICAL at 08:04

## 2024-02-05 RX ADMIN — FERROUS SULFATE TAB 325 MG (65 MG ELEMENTAL FE) 325 MG: 325 (65 FE) TAB at 08:03

## 2024-02-05 RX ADMIN — INSULIN LISPRO 4 UNITS: 100 INJECTION, SOLUTION INTRAVENOUS; SUBCUTANEOUS at 20:26

## 2024-02-05 RX ADMIN — CYANOCOBALAMIN TAB 500 MCG 1000 MCG: 500 TAB at 08:04

## 2024-02-05 RX ADMIN — Medication 500 MG: at 20:24

## 2024-02-05 RX ADMIN — OXYCODONE AND ACETAMINOPHEN 1 TABLET: 7.5; 325 TABLET ORAL at 20:25

## 2024-02-05 RX ADMIN — PREGABALIN 100 MG: 100 CAPSULE ORAL at 15:46

## 2024-02-05 RX ADMIN — SERTRALINE HYDROCHLORIDE 25 MG: 25 TABLET ORAL at 20:25

## 2024-02-05 RX ADMIN — IBUPROFEN 400 MG: 400 TABLET ORAL at 05:34

## 2024-02-05 NOTE — CONSULTS
"Nutrition Services    Patient Name: Jose Shaikh  YOB: 1960  MRN: [id number]  Admission date: 11/6/2023      CLINICAL NUTRITION ASSESSMENT      Reason for Assessment  Follow Up     H&P:  Past Medical History:   Diagnosis Date    Absence of toe of right foot     Acute osteomyelitis of left calcaneus  08/18/2021    Anxiety and depression     Arthritis     Cancer     Chronic pain     STATES HIS PAIN IS 10/10 AAT    Claustrophobia     Corns and callus     Diabetic ulcer of left heel associated with type 2 DM 08/18/2021    Diabetic ulcer of left heel associated with type 2 DM 07/06/2021    Diabetic ulcer of right midfoot associated with type 2 DM 08/18/2021    Difficulty walking     Essential hypertension 08/31/2021    Hammertoe     Hyperlipidemia LDL goal <100 08/31/2021    Ingrown toenail     Obesity     Paroxysmal atrial fibrillation 08/31/2021    Polyneuropathy     Pressure ulcer, stage 1     Tinea unguium     Type 2 diabetes mellitus with polyneuropathy         Current Problems:   Active Hospital Problems    Diagnosis     **Debility     Ulcerated, foot, left, limited to breakdown of skin     Onychomycosis     Ulcer of right foot         Nutrition/Diet History         Narrative   63-year-old male recently hospitalized September 10, 2023 through November 6, 2023.  Admitted to skilled nursing facility related to generalized weakness and deconditioning, and wound care.      Pt continues on high protein, CCHO diet w/ adequate intake, 100% of meals.     Wt is trending down after initial rise x 1 month ago. Pt is -27.7 L since 1/22/24.             Anthropometrics        Current Height, Weight Height: 188 cm (74.02\")  Weight: (!) 167 kg (367 lb 4.6 oz)   Current BMI Body mass index is 47.14 kg/m².   BMI Classification Obese Class III   % %   Adjusted Body Weight (ABW) 106 kg   Weight Hx  Wt Readings from Last 30 Encounters:   02/05/24 0500 (!) 167 kg (367 lb 4.6 oz)   02/04/24 0500 (!) 167 kg (367 lb " 8.1 oz)   02/03/24 0500 (!) 166 kg (366 lb 6.5 oz)   02/02/24 0500 (!) 168 kg (369 lb 14.9 oz)   02/01/24 0613 (!) 169 kg (372 lb 5.7 oz)   01/31/24 0500 (!) 168 kg (371 lb 4.1 oz)   01/30/24 0500 (!) 169 kg (372 lb 12.8 oz)   01/29/24 0232 (!) 169 kg (372 lb 5.7 oz)   01/28/24 0500 (!) 167 kg (368 lb 6.2 oz)   01/26/24 0400 (!) 169 kg (372 lb 2.2 oz)   01/25/24 0417 (!) 167 kg (368 lb 9.8 oz)   01/24/24 0608 (!) 168 kg (371 lb 7.6 oz)   01/23/24 0500 (!) 168 kg (369 lb 14.9 oz)   01/22/24 0500 (!) 168 kg (371 lb 4.1 oz)   01/21/24 0500 (!) 168 kg (371 lb 4.1 oz)   01/20/24 0500 (!) 168 kg (371 lb 7.6 oz)   01/19/24 0500 (!) 171 kg (376 lb 1.7 oz)   01/18/24 0500 (!) 172 kg (380 lb 1.2 oz)   01/17/24 0614 (!) 172 kg (379 lb 6.6 oz)   01/16/24 0500 (!) 171 kg (378 lb 1.4 oz)   01/15/24 0500 (!) 169 kg (372 lb 2.2 oz)   01/14/24 0546 (!) 169 kg (373 lb 7.4 oz)   01/13/24 0507 (!) 171 kg (376 lb 5.2 oz)   01/12/24 0600 (!) 170 kg (374 lb 12.5 oz)   01/11/24 0600 (!) 169 kg (371 lb 14.7 oz)   01/10/24 0600 (!) 169 kg (371 lb 11.1 oz)   01/09/24 0500 (!) 168 kg (370 lb 9.5 oz)   01/08/24 0448 (!) 168 kg (370 lb 9.5 oz)   01/07/24 0454 (!) 167 kg (367 lb 15.2 oz)   01/06/24 0500 (!) 166 kg (366 lb 10 oz)   01/05/24 0555 (!) 165 kg (364 lb 6.7 oz)   01/04/24 0500 (!) 165 kg (363 lb 12.1 oz)   01/03/24 0500 (!) 166 kg (365 lb 1.3 oz)   01/02/24 0500 (!) 167 kg (367 lb 4.6 oz)   01/01/24 0500 (!) 166 kg (365 lb 11.9 oz)   12/31/23 0500 (!) 160 kg (352 lb 4.7 oz)   12/30/23 0500 (!) 167 kg (367 lb 15.2 oz)   12/29/23 0500 (!) 166 kg (366 lb 10 oz)   12/28/23 0500 (!) 165 kg (364 lb 13.8 oz)   12/27/23 0500 (!) 166 kg (367 lb 1.1 oz)   12/26/23 0500 (!) 167 kg (367 lb 4.6 oz)   12/25/23 0500 (!) 165 kg (364 lb 3.2 oz)   12/24/23 0500 (!) 164 kg (362 lb 7 oz)   12/23/23 0500 (!) 163 kg (360 lb 0.2 oz)   12/22/23 0600 (!) 163 kg (358 lb 14.5 oz)   12/21/23 0500 (!) 163 kg (359 lb 12.7 oz)   12/20/23 0500 (!) 162 kg (357 lb 9.4  oz)   12/19/23 0550 (!) 163 kg (359 lb 5.6 oz)   12/18/23 0500 (!) 161 kg (355 lb 13.2 oz)   12/17/23 0500 (!) 160 kg (353 lb 13.4 oz)   12/16/23 1541 (!) 160 kg (353 lb 3.2 oz)   12/16/23 0500 (!) 165 kg (364 lb 13.8 oz)   12/15/23 0500 (!) 165 kg (362 lb 10.5 oz)   12/14/23 0500 (!) 157 kg (345 lb 14.4 oz)   12/13/23 0500 (!) 153 kg (337 lb 4.9 oz)   12/12/23 0500 (!) 155 kg (341 lb 0.8 oz)   12/11/23 1049 (!) 155 kg (341 lb 0.8 oz)   12/11/23 0500 (!) 160 kg (353 lb 13.4 oz)   12/10/23 0532 (!) 162 kg (356 lb 7.7 oz)   12/09/23 0500 (!) 151 kg (332 lb 10.8 oz)   12/08/23 0500 (!) 153 kg (336 lb 13.8 oz)   12/06/23 0500 (!) 154 kg (340 lb 6.2 oz)   12/05/23 0607 (!) 161 kg (354 lb 15.1 oz)   12/04/23 0500 (!) 161 kg (354 lb 4.5 oz)   12/03/23 0400 (!) 161 kg (354 lb 11.5 oz)   11/21/23 1155 (!) 162 kg (357 lb 12.8 oz)   11/09/23 0500 (!) 160 kg (353 lb 9.9 oz)   11/06/23 1422 (!) 158 kg (348 lb 5.2 oz)   11/02/23 1155 (!) 158 kg (348 lb 15.8 oz)   09/11/23 0030 (!) 151 kg (334 lb)   09/10/23 1844 (!) 154 kg (338 lb 10 oz)   08/30/23 1112 (!) 157 kg (347 lb)   08/01/23 0932 (!) 158 kg (347 lb 10.7 oz)   07/31/23 2347 (!) 163 kg (358 lb 7.5 oz)   06/16/23 2333 (!) 155 kg (342 lb 2.5 oz)   06/16/23 1053 (!) 155 kg (342 lb 3.2 oz)   06/15/23 0505 (!) 149 kg (328 lb 14.4 oz)   06/14/23 0455 (!) 149 kg (328 lb 4.8 oz)   06/13/23 1703 (!) 149 kg (328 lb 11.2 oz)   06/13/23 0600 (!) 170 kg (374 lb 12.5 oz)   06/12/23 0420 (!) 160 kg (352 lb 11.8 oz)   06/11/23 0447 (!) 150 kg (331 lb 5.6 oz)   06/10/23 0500 (!) 150 kg (330 lb 14.6 oz)   06/09/23 0536 (!) 156 kg (343 lb 7.6 oz)   06/08/23 1826 (!) 156 kg (344 lb 11.2 oz)   06/08/23 0522 (!) 158 kg (348 lb 8.8 oz)   06/07/23 0548 (!) 155 kg (342 lb 9.5 oz)   06/06/23 0515 (!) 155 kg (341 lb 14.9 oz)   06/05/23 0445 (!) 155 kg (341 lb 9.6 oz)   06/05/23 0348 (!) 158 kg (349 lb 3.3 oz)   06/04/23 0555 (!) 157 kg (346 lb 3.2 oz)   06/03/23 0539 (!) 158 kg (349 lb)   06/02/23  0548 (!) 163 kg (359 lb 3.2 oz)   06/01/23 0600 (!) 164 kg (362 lb)   05/31/23 0507 (!) 164 kg (362 lb 4.8 oz)   05/30/23 0635 (!) 164 kg (362 lb 7 oz)   05/29/23 0500 (!) 167 kg (368 lb 2.7 oz)   05/28/23 0600 (!) 167 kg (367 lb 11.6 oz)   05/27/23 0600 (!) 167 kg (369 lb 0.8 oz)   05/26/23 0529 (!) 167 kg (369 lb 3.2 oz)   05/25/23 0600 (!) 168 kg (370 lb 3.2 oz)   05/24/23 0600 (!) 166 kg (366 lb 9.6 oz)   05/22/23 0529 (!) 169 kg (373 lb 7.4 oz)   05/21/23 0600 (!) 169 kg (371 lb 12.8 oz)   05/20/23 0600 (!) 171 kg (376 lb 5.2 oz)   05/19/23 0300 (!) 168 kg (370 lb 14.4 oz)   05/18/23 1912 (!) 168 kg (371 lb 7.6 oz)   05/18/23 0600 (!) 169 kg (371 lb 11.1 oz)   05/16/23 0700 (!) 171 kg (377 lb 4.8 oz)   05/14/23 0500 (!) 171 kg (377 lb 3.3 oz)   05/12/23 1143 (!) 170 kg (375 lb)   05/06/23 0258 (!) 170 kg (375 lb 8 oz)   04/19/23 0909 (!) 163 kg (359 lb)   04/03/23 1906 (!) 168 kg (370 lb)   03/27/23 0938 (!) 170 kg (373 lb 10.9 oz)   03/17/23 1153 (!) 168 kg (370 lb)   01/27/23 1501 (!) 168 kg (370 lb)   12/22/22 1501 (!) 171 kg (376 lb)   11/08/22 1035 (!) 161 kg (355 lb)   10/01/22 1141 (!) 164 kg (360 lb 10.8 oz)   05/18/22 1311 (!) 155 kg (341 lb)   03/24/22 1432 (!) 155 kg (341 lb)   03/02/22 1412 (!) 155 kg (341 lb)   01/12/22 1317 (!) 155 kg (341 lb)   12/30/21 1431 (!) 155 kg (341 lb)   12/01/21 1144 (!) 155 kg (341 lb 11.4 oz)   12/01/21 0843 (!) 157 kg (346 lb)   11/22/21 0839 (!) 157 kg (346 lb)   11/15/21 1148 (!) 157 kg (346 lb 12.5 oz)   11/09/21 1139 (!) 157 kg (345 lb 7.4 oz)   11/05/21 1130 (!) 159 kg (351 lb 3.1 oz)   11/02/21 1121 (!) 161 kg (356 lb)   10/27/21 1048 (!) 161 kg (356 lb)   10/21/21 1418 (!) 162 kg (356 lb 14.8 oz)          Wt Change Observation Stable x 1 month     Estimated/Assessed Needs  Estimated Needs based on: Adjusted Body Weight       Energy Requirements 25 kcal/kg    EST Needs (kcal/day) 2650       Protein Requirements 1.0-1.2 g/kg   EST Daily Needs (g/day) 106-127        Fluid Requirements 25 ml/kg    Estimated Needs (mL/day) 2650     Labs/Medications         Pertinent Labs Reviewed.   Results from last 7 days   Lab Units 01/31/24  0507   SODIUM mmol/L 136   POTASSIUM mmol/L 4.1   CHLORIDE mmol/L 104   CO2 mmol/L 24.5   BUN mg/dL 14   CREATININE mg/dL 0.52*   CALCIUM mg/dL 8.3*   BILIRUBIN mg/dL 0.7   ALK PHOS U/L 205*   ALT (SGPT) U/L 41   AST (SGOT) U/L 52*   GLUCOSE mg/dL 101*         COVID19   Date Value Ref Range Status   02/04/2024 Not Detected Not Detected - Ref. Range Final     Lab Results   Component Value Date    HGBA1C 5.50 01/31/2024         Pertinent Medications Reviewed.     Malnutrition Severity Assessment              Nutrition Diagnosis         Nutrition Dx Problem 1 Increased nutrient needs related to increased protein demand as evidenced by impaired skin integrity.     Nutrition Intervention           Current Nutrition Orders & Evaluation of Intake       Current PO Diet Diet: Diabetic Diets, High Protein Diet; Consistent Carbohydrate; Texture: Regular Texture (IDDSI 7); Fluid Consistency: Thin (IDDSI 0)   Supplement No active supplement orders           Nutrition Intervention/Prescription        High protein diet         Medical Nutrition Therapy/Nutrition Education          Learner     Readiness Patient  Education not indicated at this time     Method     Response N/A  N/A     Monitor/Evaluation        Monitor Per protocol, PO intake, Weight, Skin status, POC/GOC     Nutrition Discharge Plan         Continue high protein diet upon discharge      Electronically signed by:  Tiff Orellana RD  02/05/24 13:45 EST

## 2024-02-05 NOTE — PLAN OF CARE
Problem: Adult Inpatient Plan of Care  Goal: Plan of Care Review  Outcome: Ongoing, Progressing  Goal: Patient-Specific Goal (Individualized)  Outcome: Ongoing, Progressing  Goal: Absence of Hospital-Acquired Illness or Injury  Outcome: Ongoing, Progressing  Intervention: Identify and Manage Fall Risk  Intervention: Prevent Skin Injury  Intervention: Prevent and Manage VTE (Venous Thromboembolism) Risk  Intervention: Prevent Infection  Goal: Optimal Comfort and Wellbeing  Outcome: Ongoing, Progressing  Intervention: Provide Person-Centered Care  Goal: Readiness for Transition of Care  Outcome: Ongoing, Progressing   Goal Outcome Evaluation:  Plan of Care Reviewed With: patient        Progress: improving

## 2024-02-05 NOTE — PLAN OF CARE
Goal Outcome Evaluation:  Plan of Care Reviewed With: patient        Progress: improving  Outcome Evaluation: Resident alert, oriented, able to make needs known to staff. Sat on the side of bed today and did leg exercises with staff today. Down 5 lbs today since starting ozempic. Treatment done to bl feet as ordered, tolerated well. Blood glucose elevated at BF today, covered with sliding scale, stable at lunch and dinner. Resident door dashed dinner today. Continues to be incontinent of stool. Resident pleasant and cooperative.

## 2024-02-05 NOTE — PLAN OF CARE
Goal Outcome Evaluation:  Plan of Care Reviewed With: patient        Progress: no change  Outcome Evaluation: Alert and oriented x4; able to make needs known to staff. Percocet x2, Baclofen x1 and Motrin x1 for complaints of pain to left hip and shoulder. Pepto administered for liquid BM this am. Assists with turns after incontinence but refuses turns Q 2hrs. Refuses IS. Call light within reach; care plan ongoing.

## 2024-02-06 LAB
GLUCOSE BLDC GLUCOMTR-MCNC: 111 MG/DL (ref 70–99)
GLUCOSE BLDC GLUCOMTR-MCNC: 147 MG/DL (ref 70–99)
GLUCOSE BLDC GLUCOMTR-MCNC: 159 MG/DL (ref 70–99)

## 2024-02-06 PROCEDURE — 82948 REAGENT STRIP/BLOOD GLUCOSE: CPT

## 2024-02-06 PROCEDURE — 82948 REAGENT STRIP/BLOOD GLUCOSE: CPT | Performed by: INTERNAL MEDICINE

## 2024-02-06 PROCEDURE — 63710000001 INSULIN DETEMIR PER 5 UNITS: Performed by: PHYSICIAN ASSISTANT

## 2024-02-06 PROCEDURE — 63710000001 INSULIN LISPRO (HUMAN) PER 5 UNITS: Performed by: INTERNAL MEDICINE

## 2024-02-06 RX ORDER — BACLOFEN 10 MG/1
10 TABLET ORAL 3 TIMES DAILY PRN
Status: DISPENSED | OUTPATIENT
Start: 2024-02-06 | End: 2024-02-20

## 2024-02-06 RX ADMIN — INSULIN DETEMIR 40 UNITS: 100 INJECTION, SOLUTION SUBCUTANEOUS at 20:21

## 2024-02-06 RX ADMIN — PREGABALIN 100 MG: 100 CAPSULE ORAL at 16:16

## 2024-02-06 RX ADMIN — LISINOPRIL 2.5 MG: 2.5 TABLET ORAL at 08:17

## 2024-02-06 RX ADMIN — OXYCODONE AND ACETAMINOPHEN 1 TABLET: 7.5; 325 TABLET ORAL at 02:53

## 2024-02-06 RX ADMIN — Medication 500 MG: at 08:17

## 2024-02-06 RX ADMIN — EMPAGLIFLOZIN 10 MG: 10 TABLET, FILM COATED ORAL at 08:17

## 2024-02-06 RX ADMIN — FERROUS SULFATE TAB 325 MG (65 MG ELEMENTAL FE) 325 MG: 325 (65 FE) TAB at 08:17

## 2024-02-06 RX ADMIN — PREGABALIN 100 MG: 100 CAPSULE ORAL at 20:23

## 2024-02-06 RX ADMIN — BACLOFEN 10 MG: 10 TABLET ORAL at 01:35

## 2024-02-06 RX ADMIN — OXYCODONE AND ACETAMINOPHEN 1 TABLET: 7.5; 325 TABLET ORAL at 23:24

## 2024-02-06 RX ADMIN — APIXABAN 5 MG: 5 TABLET, FILM COATED ORAL at 20:23

## 2024-02-06 RX ADMIN — Medication 500 MG: at 20:23

## 2024-02-06 RX ADMIN — IBUPROFEN 400 MG: 400 TABLET ORAL at 05:34

## 2024-02-06 RX ADMIN — OXYCODONE AND ACETAMINOPHEN 1 TABLET: 7.5; 325 TABLET ORAL at 13:36

## 2024-02-06 RX ADMIN — INSULIN LISPRO 4 UNITS: 100 INJECTION, SOLUTION INTRAVENOUS; SUBCUTANEOUS at 20:21

## 2024-02-06 RX ADMIN — Medication 10 MG: at 20:23

## 2024-02-06 RX ADMIN — CYANOCOBALAMIN TAB 500 MCG 1000 MCG: 500 TAB at 08:17

## 2024-02-06 RX ADMIN — INSULIN DETEMIR 40 UNITS: 100 INJECTION, SOLUTION SUBCUTANEOUS at 08:16

## 2024-02-06 RX ADMIN — ATORVASTATIN CALCIUM 10 MG: 10 TABLET, FILM COATED ORAL at 20:23

## 2024-02-06 RX ADMIN — PREGABALIN 100 MG: 100 CAPSULE ORAL at 08:17

## 2024-02-06 RX ADMIN — SERTRALINE HYDROCHLORIDE 25 MG: 25 TABLET ORAL at 20:23

## 2024-02-06 RX ADMIN — APIXABAN 5 MG: 5 TABLET, FILM COATED ORAL at 08:17

## 2024-02-06 RX ADMIN — BISMUTH SUBSALICYLATE 524 MG: 262 TABLET, CHEWABLE ORAL at 13:36

## 2024-02-06 NOTE — PLAN OF CARE
Goal Outcome Evaluation:  Plan of Care Reviewed With: patient        Progress: no change  Outcome Evaluation: Alert and oriented x4; able to make needs known to staff. Percocet administered x2, Baclofen x2 for complaints of left hip and shoulder pain. Multiple soft BM's this shift both continent and incontinent. Utilizes urinal to void. Call light within reach; care plan ongoing.

## 2024-02-07 LAB
GLUCOSE BLDC GLUCOMTR-MCNC: 137 MG/DL (ref 70–99)
GLUCOSE BLDC GLUCOMTR-MCNC: 163 MG/DL (ref 70–99)
GLUCOSE BLDC GLUCOMTR-MCNC: 186 MG/DL (ref 70–99)
GLUCOSE BLDC GLUCOMTR-MCNC: 86 MG/DL (ref 70–99)

## 2024-02-07 PROCEDURE — 82948 REAGENT STRIP/BLOOD GLUCOSE: CPT

## 2024-02-07 PROCEDURE — 63710000001 INSULIN LISPRO (HUMAN) PER 5 UNITS: Performed by: INTERNAL MEDICINE

## 2024-02-07 PROCEDURE — 82948 REAGENT STRIP/BLOOD GLUCOSE: CPT | Performed by: INTERNAL MEDICINE

## 2024-02-07 PROCEDURE — 63710000001 INSULIN DETEMIR PER 5 UNITS: Performed by: PHYSICIAN ASSISTANT

## 2024-02-07 PROCEDURE — 63710000001 ONDANSETRON ODT 4 MG TABLET DISPERSIBLE: Performed by: INTERNAL MEDICINE

## 2024-02-07 RX ADMIN — OXYCODONE AND ACETAMINOPHEN 1 TABLET: 7.5; 325 TABLET ORAL at 21:10

## 2024-02-07 RX ADMIN — BACLOFEN 10 MG: 10 TABLET ORAL at 05:37

## 2024-02-07 RX ADMIN — Medication 10 MG: at 21:09

## 2024-02-07 RX ADMIN — APIXABAN 5 MG: 5 TABLET, FILM COATED ORAL at 09:16

## 2024-02-07 RX ADMIN — BACLOFEN 10 MG: 10 TABLET ORAL at 12:57

## 2024-02-07 RX ADMIN — FERROUS SULFATE TAB 325 MG (65 MG ELEMENTAL FE) 325 MG: 325 (65 FE) TAB at 07:59

## 2024-02-07 RX ADMIN — Medication 500 MG: at 21:11

## 2024-02-07 RX ADMIN — ONDANSETRON 4 MG: 4 TABLET, ORALLY DISINTEGRATING ORAL at 12:57

## 2024-02-07 RX ADMIN — EMPAGLIFLOZIN 10 MG: 10 TABLET, FILM COATED ORAL at 09:16

## 2024-02-07 RX ADMIN — PREGABALIN 100 MG: 100 CAPSULE ORAL at 16:05

## 2024-02-07 RX ADMIN — OXYCODONE AND ACETAMINOPHEN 1 TABLET: 7.5; 325 TABLET ORAL at 07:59

## 2024-02-07 RX ADMIN — HYDROCORTISONE: 25 CREAM TOPICAL at 09:17

## 2024-02-07 RX ADMIN — LISINOPRIL 2.5 MG: 2.5 TABLET ORAL at 09:16

## 2024-02-07 RX ADMIN — INSULIN DETEMIR 40 UNITS: 100 INJECTION, SOLUTION SUBCUTANEOUS at 09:16

## 2024-02-07 RX ADMIN — OXYCODONE AND ACETAMINOPHEN 1 TABLET: 7.5; 325 TABLET ORAL at 15:07

## 2024-02-07 RX ADMIN — INSULIN LISPRO 4 UNITS: 100 INJECTION, SOLUTION INTRAVENOUS; SUBCUTANEOUS at 12:03

## 2024-02-07 RX ADMIN — INSULIN DETEMIR 40 UNITS: 100 INJECTION, SOLUTION SUBCUTANEOUS at 21:11

## 2024-02-07 RX ADMIN — CYANOCOBALAMIN TAB 500 MCG 1000 MCG: 500 TAB at 09:16

## 2024-02-07 RX ADMIN — Medication 500 MG: at 09:16

## 2024-02-07 RX ADMIN — BISMUTH SUBSALICYLATE 524 MG: 262 TABLET, CHEWABLE ORAL at 15:07

## 2024-02-07 RX ADMIN — APIXABAN 5 MG: 5 TABLET, FILM COATED ORAL at 21:10

## 2024-02-07 RX ADMIN — ATORVASTATIN CALCIUM 10 MG: 10 TABLET, FILM COATED ORAL at 21:09

## 2024-02-07 RX ADMIN — PREGABALIN 100 MG: 100 CAPSULE ORAL at 09:16

## 2024-02-07 RX ADMIN — PREGABALIN 100 MG: 100 CAPSULE ORAL at 21:10

## 2024-02-07 RX ADMIN — SERTRALINE HYDROCHLORIDE 25 MG: 25 TABLET ORAL at 21:09

## 2024-02-07 NOTE — PLAN OF CARE
Goal Outcome Evaluation:  Plan of Care Reviewed With: patient        Progress: improving  Outcome Evaluation: Resident alert, oriented, able to make needs known to staff, Blood glucose stable this shift, no sliding scale required. Medicated with prn pepto bismol for loose stools x1, medicated for prn pain with percocet x1, effective. sat at the side of bed with staff today and did leg exercises at bedside. has been using bedpan for stools but still has incontinent episodes at times. uses urinal independently at BS. Resident pleasant and cooperative.

## 2024-02-07 NOTE — PLAN OF CARE
Goal Outcome Evaluation:   Alert and oriented and pleasant with staff. X2 Max assist for transfers and ambulation. X2 admin PRN pain medication, with relief of symptoms noted, x2 admin PRN anti emetic medication, with relief of symptoms noted. Sitting up in bed, call light in reach.

## 2024-02-07 NOTE — PLAN OF CARE
Goal Outcome Evaluation:  Plan of Care Reviewed With: patient        Progress: no change  Outcome Evaluation: Alert and oriented x4; able to make needs known to staff. No significant changes. Percocet administered x1 for complaitns of left hip pain with relief. Awake most of shift watching TV and playing games on phone. Call light within reach; care plan ongoing.

## 2024-02-08 LAB
GLUCOSE BLDC GLUCOMTR-MCNC: 114 MG/DL (ref 70–99)
GLUCOSE BLDC GLUCOMTR-MCNC: 116 MG/DL (ref 70–99)
GLUCOSE BLDC GLUCOMTR-MCNC: 138 MG/DL (ref 70–99)
GLUCOSE BLDC GLUCOMTR-MCNC: 223 MG/DL (ref 70–99)
GLUCOSE BLDC GLUCOMTR-MCNC: 287 MG/DL (ref 70–99)
GLUCOSE BLDC GLUCOMTR-MCNC: 99 MG/DL (ref 70–99)
SARS-COV-2 RNA RESP QL NAA+PROBE: NOT DETECTED

## 2024-02-08 PROCEDURE — 82948 REAGENT STRIP/BLOOD GLUCOSE: CPT

## 2024-02-08 PROCEDURE — 87635 SARS-COV-2 COVID-19 AMP PRB: CPT | Performed by: PHYSICIAN ASSISTANT

## 2024-02-08 PROCEDURE — 63710000001 INSULIN DETEMIR PER 5 UNITS: Performed by: PHYSICIAN ASSISTANT

## 2024-02-08 PROCEDURE — 63710000001 INSULIN LISPRO (HUMAN) PER 5 UNITS: Performed by: INTERNAL MEDICINE

## 2024-02-08 RX ADMIN — Medication 500 MG: at 08:18

## 2024-02-08 RX ADMIN — PREGABALIN 100 MG: 100 CAPSULE ORAL at 08:18

## 2024-02-08 RX ADMIN — IBUPROFEN 400 MG: 400 TABLET ORAL at 06:17

## 2024-02-08 RX ADMIN — APIXABAN 5 MG: 5 TABLET, FILM COATED ORAL at 08:18

## 2024-02-08 RX ADMIN — ATORVASTATIN CALCIUM 10 MG: 10 TABLET, FILM COATED ORAL at 20:55

## 2024-02-08 RX ADMIN — INSULIN LISPRO 4 UNITS: 100 INJECTION, SOLUTION INTRAVENOUS; SUBCUTANEOUS at 20:56

## 2024-02-08 RX ADMIN — PREGABALIN 100 MG: 100 CAPSULE ORAL at 20:55

## 2024-02-08 RX ADMIN — INSULIN DETEMIR 40 UNITS: 100 INJECTION, SOLUTION SUBCUTANEOUS at 08:19

## 2024-02-08 RX ADMIN — SERTRALINE HYDROCHLORIDE 25 MG: 25 TABLET ORAL at 20:58

## 2024-02-08 RX ADMIN — EMPAGLIFLOZIN 10 MG: 10 TABLET, FILM COATED ORAL at 08:18

## 2024-02-08 RX ADMIN — APIXABAN 5 MG: 5 TABLET, FILM COATED ORAL at 20:56

## 2024-02-08 RX ADMIN — INSULIN LISPRO 12 UNITS: 100 INJECTION, SOLUTION INTRAVENOUS; SUBCUTANEOUS at 17:48

## 2024-02-08 RX ADMIN — Medication 500 MG: at 20:56

## 2024-02-08 RX ADMIN — CYANOCOBALAMIN TAB 500 MCG 1000 MCG: 500 TAB at 08:17

## 2024-02-08 RX ADMIN — LISINOPRIL 2.5 MG: 2.5 TABLET ORAL at 08:18

## 2024-02-08 RX ADMIN — OXYCODONE AND ACETAMINOPHEN 1 TABLET: 7.5; 325 TABLET ORAL at 16:07

## 2024-02-08 RX ADMIN — BACLOFEN 10 MG: 10 TABLET ORAL at 20:56

## 2024-02-08 RX ADMIN — OXYCODONE AND ACETAMINOPHEN 1 TABLET: 7.5; 325 TABLET ORAL at 08:18

## 2024-02-08 RX ADMIN — INSULIN DETEMIR 40 UNITS: 100 INJECTION, SOLUTION SUBCUTANEOUS at 20:56

## 2024-02-08 RX ADMIN — Medication 10 MG: at 20:55

## 2024-02-08 RX ADMIN — BACLOFEN 10 MG: 10 TABLET ORAL at 00:33

## 2024-02-08 RX ADMIN — BACLOFEN 10 MG: 10 TABLET ORAL at 09:44

## 2024-02-08 RX ADMIN — PREGABALIN 100 MG: 100 CAPSULE ORAL at 16:07

## 2024-02-08 RX ADMIN — FERROUS SULFATE TAB 325 MG (65 MG ELEMENTAL FE) 325 MG: 325 (65 FE) TAB at 08:18

## 2024-02-08 NOTE — SIGNIFICANT NOTE
Wound Eval / Progress Noted    KEO Dominguez     Patient Name: Jose Shaikh  : 1960  MRN: [id number]  Today's Date: 2024                 Admit Date: 2023    Visit Dx:    ICD-10-CM ICD-9-CM   1. Difficulty in walking  R26.2 719.7   2. Type 2 diabetes mellitus with other specified complication, unspecified whether long term insulin use  E11.69 250.80         Debility    Ulcer of right foot    Ulcerated, foot, left, limited to breakdown of skin    Onychomycosis        Past Medical History:   Diagnosis Date    Absence of toe of right foot     Acute osteomyelitis of left calcaneus  2021    Anxiety and depression     Arthritis     Cancer     Chronic pain     STATES HIS PAIN IS 10/10 AAT    Claustrophobia     Corns and callus     Diabetic ulcer of left heel associated with type 2 DM 2021    Diabetic ulcer of left heel associated with type 2 DM 2021    Diabetic ulcer of right midfoot associated with type 2 DM 2021    Difficulty walking     Essential hypertension 2021    Hammertoe     Hyperlipidemia LDL goal <100 2021    Ingrown toenail     Obesity     Paroxysmal atrial fibrillation 2021    Polyneuropathy     Pressure ulcer, stage 1     Tinea unguium     Type 2 diabetes mellitus with polyneuropathy         Past Surgical History:   Procedure Laterality Date    CYST REMOVAL      center of back; benign    EYE SURGERY      INCISION AND DRAINAGE ABSCESS      back    INCISION AND DRAINAGE LEG Right 12/10/2021    Procedure: INCISION AND DRAINAGE LOWER EXTREMITY;  Surgeon: Ash Leyva DPM;  Location: [facility] MAIN OR;  Service: Podiatry;  Laterality: Right;    OTHER SURGICAL HISTORY      Surgical clips left foot    TOE SURGERY Right     Removal of 5th toe    TRANS METATARSAL AMPUTATION Right 2021    Procedure: AMPUTATION TRANS METATARSAL;  Surgeon: Ash Leyva DPM;  Location: [facility] MAIN OR;  Service: Podiatry;  Laterality: Right;    VASCULAR  SURGERY      WRIST SURGERY Left     repair of injury         Physical Assessment:  Wound 01/22/24 1343 Left anterior fourth toe Abrasion (Active)   Wound Image   02/08/24 1044   Dressing Appearance intact;dried drainage 02/08/24 1044   Closure None 02/08/24 1044   Base moist;pink;yellow 02/08/24 1044   Periwound macerated;pink;moist 02/08/24 1044   Periwound Temperature cool 02/08/24 1044   Periwound Skin Turgor soft 02/08/24 1044   Edges open 02/08/24 1044   Wound Length (cm) 0.9 cm 02/08/24 1044   Wound Width (cm) 0.5 cm 02/08/24 1044   Wound Depth (cm) 0.1 cm 02/08/24 1044   Wound Surface Area (cm^2) 0.45 cm^2 02/08/24 1044   Wound Volume (cm^3) 0.045 cm^3 02/08/24 1044   Drainage Characteristics/Odor yellow;serous 02/08/24 1044   Drainage Amount scant 02/08/24 1044   Care, Wound cleansed with;sterile normal saline 02/08/24 1044   Dressing Care dressing applied;silicone;border dressing;other (see comments) 02/08/24 1044   Periwound Care absorptive dressing applied 02/08/24 1044       Wound 02/01/24 1254 Left distal calf (Active)   Dressing Appearance open to air 02/08/24 1044   Closure None 02/08/24 1044   Base dry;red 02/08/24 1044   Periwound dry;redness 02/08/24 1044   Periwound Temperature warm 02/08/24 1044   Periwound Skin Turgor soft 02/08/24 1044   Edges rolled/closed 02/08/24 1044   Drainage Amount none 02/08/24 1044   Care, Wound cleansed with;sterile normal saline 02/08/24 1044   Dressing Care open to air;other (see comments) 02/08/24 1044   Periwound Care moisturizer applied 02/08/24 1044        Wound Check / Follow-up:  Patient seen today for weekly wound care visit. Patient was up in his recliner when wound RN entered his room. Patient states that his right foot wound has healed and is no longer open. Patient states that his BLE remain very sore to the touch and patient groaned during RN skin assessment and treatment.     Patient's right foot ulceration appears to have resolved. Skin has completely  epithelialized and no drainage noted. Skin is pink, dry and intact. Recommending to discontinue current wound care orders and to now perform regular skin care using the purple top moisturizer to hydrate skin and prevent breakdown.     Patient's left forth toe wound remains open, moist and with a pink/yellow base. Recommending to continue current wound care orders of medihoney to the wound bed and cover with a band-aid every other day.     Patient left calf skin remains reddened, dry, flaky and tender to the touch. Wound RN applied purple top moisturizer to the area. Patient complained of pain in the left leg with application of the moisturizer. Patient states that the moisturizer did not burn, but the rubbing of the leg was causing him discomfort. Recommending to continue application of moisturizer to BLE as ordered.     Patient's skin folds continue to have redness and irritation due to MASD. Recommending to continue to cleanse patient's skin well with soap and water, pat dry and apply a light dusting of corn starch powder to skin folds BID and cover with moisture wicking pads.     Impression: Left forth toe wound, LLE dry flaking skin    Short term goals: Regain skin integrity, skin protection, skin care, moisture prevention, every other day wound care.     Candy Garcia RN    2/8/2024    14:12 EST

## 2024-02-08 NOTE — PLAN OF CARE
Goal Outcome Evaluation:  Plan of Care Reviewed With: patient        Progress: no change  Outcome Evaluation: Patient alert and oriented x4. Got up in his recliner with 3 assist today at 0845 and was until after lunch while his bed was taken to be recalibrated. A temporary bed was brought into the room and he was assisted into that by staff at 1300. At 1720 the original bed was brought back to unit and patient was transfered with 3 assist from the temp bed to the original bed. Patient tolerated the moving well. Given PRN Percocet at 0819 and again at 1607 for c/o severe left hip pain. Med effective each occurance. Given Baclofen for c/o left shoulder pain at 0944. Left arm also supported by pillows to reduce pain. Interventions effective. Voices needs. Con't current POC.

## 2024-02-08 NOTE — PLAN OF CARE
Goal Outcome Evaluation:           Progress: no change  Outcome Evaluation: Patient is alert and oriented x4, able to make needs known to staff.  Has been medicated x1 this shift with prn oxycodone and baclofen, see emar.  Rsd. states medication effective.  Refuses to use bedpan this shift and has been incontinent of bowels.  Urinal remains at bedside.  Activity encouraged this shift, Rsd. has attempted to pull self up in bed with overhead trapeze bar.  Has not ambulated or gotten out of bed this shift, Refuses to be turned, but does shift weight frequently while in bed.  Refuses bed alert.   Call light and personal items within reach, Rsd. reminded to use call light for assistance, verbalizes understanding.  WIll continue to monitor and notify on-coming staff.  Current plan of care remains in place at this time. HS blood sugar was 116, did not convert from glucometer to computer.  Will notify lab/IT.

## 2024-02-09 LAB
GLUCOSE BLDC GLUCOMTR-MCNC: 117 MG/DL (ref 70–99)
GLUCOSE BLDC GLUCOMTR-MCNC: 138 MG/DL (ref 70–99)
GLUCOSE BLDC GLUCOMTR-MCNC: 157 MG/DL (ref 70–99)
GLUCOSE BLDC GLUCOMTR-MCNC: 178 MG/DL (ref 70–99)
GLUCOSE BLDC GLUCOMTR-MCNC: 195 MG/DL (ref 70–99)

## 2024-02-09 PROCEDURE — 63710000001 INSULIN DETEMIR PER 5 UNITS: Performed by: PHYSICIAN ASSISTANT

## 2024-02-09 PROCEDURE — 82948 REAGENT STRIP/BLOOD GLUCOSE: CPT | Performed by: INTERNAL MEDICINE

## 2024-02-09 PROCEDURE — 82948 REAGENT STRIP/BLOOD GLUCOSE: CPT

## 2024-02-09 PROCEDURE — 63710000001 INSULIN LISPRO (HUMAN) PER 5 UNITS: Performed by: INTERNAL MEDICINE

## 2024-02-09 RX ADMIN — INSULIN LISPRO 4 UNITS: 100 INJECTION, SOLUTION INTRAVENOUS; SUBCUTANEOUS at 17:43

## 2024-02-09 RX ADMIN — APIXABAN 5 MG: 5 TABLET, FILM COATED ORAL at 09:46

## 2024-02-09 RX ADMIN — PREGABALIN 100 MG: 100 CAPSULE ORAL at 16:58

## 2024-02-09 RX ADMIN — FERROUS SULFATE TAB 325 MG (65 MG ELEMENTAL FE) 325 MG: 325 (65 FE) TAB at 08:00

## 2024-02-09 RX ADMIN — INSULIN DETEMIR 40 UNITS: 100 INJECTION, SOLUTION SUBCUTANEOUS at 09:46

## 2024-02-09 RX ADMIN — Medication 10 MG: at 20:29

## 2024-02-09 RX ADMIN — INSULIN DETEMIR 40 UNITS: 100 INJECTION, SOLUTION SUBCUTANEOUS at 20:30

## 2024-02-09 RX ADMIN — IBUPROFEN 400 MG: 400 TABLET ORAL at 03:43

## 2024-02-09 RX ADMIN — PREGABALIN 100 MG: 100 CAPSULE ORAL at 09:46

## 2024-02-09 RX ADMIN — SERTRALINE HYDROCHLORIDE 25 MG: 25 TABLET ORAL at 20:31

## 2024-02-09 RX ADMIN — ATORVASTATIN CALCIUM 10 MG: 10 TABLET, FILM COATED ORAL at 20:29

## 2024-02-09 RX ADMIN — INSULIN LISPRO 4 UNITS: 100 INJECTION, SOLUTION INTRAVENOUS; SUBCUTANEOUS at 08:00

## 2024-02-09 RX ADMIN — APIXABAN 5 MG: 5 TABLET, FILM COATED ORAL at 20:29

## 2024-02-09 RX ADMIN — Medication 500 MG: at 09:46

## 2024-02-09 RX ADMIN — INSULIN LISPRO 4 UNITS: 100 INJECTION, SOLUTION INTRAVENOUS; SUBCUTANEOUS at 20:29

## 2024-02-09 RX ADMIN — PREGABALIN 100 MG: 100 CAPSULE ORAL at 20:29

## 2024-02-09 RX ADMIN — OXYCODONE AND ACETAMINOPHEN 1 TABLET: 7.5; 325 TABLET ORAL at 00:39

## 2024-02-09 RX ADMIN — OXYCODONE AND ACETAMINOPHEN 1 TABLET: 7.5; 325 TABLET ORAL at 20:29

## 2024-02-09 RX ADMIN — OXYCODONE AND ACETAMINOPHEN 1 TABLET: 7.5; 325 TABLET ORAL at 11:09

## 2024-02-09 RX ADMIN — CYANOCOBALAMIN TAB 500 MCG 1000 MCG: 500 TAB at 09:46

## 2024-02-09 RX ADMIN — EMPAGLIFLOZIN 10 MG: 10 TABLET, FILM COATED ORAL at 09:46

## 2024-02-09 RX ADMIN — LISINOPRIL 2.5 MG: 2.5 TABLET ORAL at 09:46

## 2024-02-09 RX ADMIN — Medication 500 MG: at 20:29

## 2024-02-09 RX ADMIN — BACLOFEN 10 MG: 10 TABLET ORAL at 15:58

## 2024-02-09 NOTE — PLAN OF CARE
Goal Outcome Evaluation:           Progress: no change    Outcome Evaluation: Patient is alert and oriented x4, able to make needs known to staff.  Has been medicated x1 this shift with prn oxycodone,baclofen and Motrin, see emar.  Rsd. states medication effective.  Rsd. used bedpan this shift and has been incontinent of bowels.  Urinal remains at bedside.  Activity encouraged this shift, Rsd. has attempted to pull self up in bed with overhead trapeze bar.  Has not ambulated or gotten out of bed this shift, Refuses to be turned, but does shift weight frequently while in bed.  Refuses bed alert.  Bowels formed this shift.  Call light and personal items within reach, Rsd. reminded to use call light for assistance, verbalizes understanding.  WIll continue to monitor and notify on-coming staff.  Current plan of care remains in place at this time.

## 2024-02-09 NOTE — PLAN OF CARE
Goal Outcome Evaluation:   Alert and oriented and pleasant with staff. X2 max assist for transfers and ambulation. X2 admin PRN pain medication with relief of symptoms noted. No other significant changes noted this shift. Sitting up in bed, call light in reach.

## 2024-02-10 PROCEDURE — 99308 SBSQ NF CARE LOW MDM 20: CPT | Performed by: PHYSICIAN ASSISTANT

## 2024-02-10 PROCEDURE — 63710000001 INSULIN DETEMIR PER 5 UNITS: Performed by: PHYSICIAN ASSISTANT

## 2024-02-10 PROCEDURE — 82948 REAGENT STRIP/BLOOD GLUCOSE: CPT

## 2024-02-10 PROCEDURE — 63710000001 INSULIN LISPRO (HUMAN) PER 5 UNITS: Performed by: INTERNAL MEDICINE

## 2024-02-10 RX ADMIN — APIXABAN 5 MG: 5 TABLET, FILM COATED ORAL at 08:29

## 2024-02-10 RX ADMIN — INSULIN DETEMIR 40 UNITS: 100 INJECTION, SOLUTION SUBCUTANEOUS at 08:29

## 2024-02-10 RX ADMIN — Medication 500 MG: at 08:29

## 2024-02-10 RX ADMIN — ATORVASTATIN CALCIUM 10 MG: 10 TABLET, FILM COATED ORAL at 20:09

## 2024-02-10 RX ADMIN — BACLOFEN 10 MG: 10 TABLET ORAL at 22:08

## 2024-02-10 RX ADMIN — SERTRALINE HYDROCHLORIDE 25 MG: 25 TABLET ORAL at 20:09

## 2024-02-10 RX ADMIN — Medication 10 MG: at 20:09

## 2024-02-10 RX ADMIN — BACLOFEN 10 MG: 10 TABLET ORAL at 13:58

## 2024-02-10 RX ADMIN — BISMUTH SUBSALICYLATE 524 MG: 262 TABLET, CHEWABLE ORAL at 20:09

## 2024-02-10 RX ADMIN — INSULIN LISPRO 4 UNITS: 100 INJECTION, SOLUTION INTRAVENOUS; SUBCUTANEOUS at 11:58

## 2024-02-10 RX ADMIN — PREGABALIN 100 MG: 100 CAPSULE ORAL at 20:09

## 2024-02-10 RX ADMIN — INSULIN DETEMIR 40 UNITS: 100 INJECTION, SOLUTION SUBCUTANEOUS at 20:09

## 2024-02-10 RX ADMIN — OXYCODONE AND ACETAMINOPHEN 1 TABLET: 7.5; 325 TABLET ORAL at 07:24

## 2024-02-10 RX ADMIN — LISINOPRIL 2.5 MG: 2.5 TABLET ORAL at 08:29

## 2024-02-10 RX ADMIN — IBUPROFEN 400 MG: 400 TABLET ORAL at 22:08

## 2024-02-10 RX ADMIN — CYANOCOBALAMIN TAB 500 MCG 1000 MCG: 500 TAB at 08:29

## 2024-02-10 RX ADMIN — APIXABAN 5 MG: 5 TABLET, FILM COATED ORAL at 20:09

## 2024-02-10 RX ADMIN — Medication 500 MG: at 20:09

## 2024-02-10 RX ADMIN — PREGABALIN 100 MG: 100 CAPSULE ORAL at 16:07

## 2024-02-10 RX ADMIN — INSULIN LISPRO 4 UNITS: 100 INJECTION, SOLUTION INTRAVENOUS; SUBCUTANEOUS at 20:09

## 2024-02-10 RX ADMIN — OXYCODONE AND ACETAMINOPHEN 1 TABLET: 7.5; 325 TABLET ORAL at 20:09

## 2024-02-10 RX ADMIN — PREGABALIN 100 MG: 100 CAPSULE ORAL at 08:29

## 2024-02-10 RX ADMIN — FERROUS SULFATE TAB 325 MG (65 MG ELEMENTAL FE) 325 MG: 325 (65 FE) TAB at 07:24

## 2024-02-10 RX ADMIN — EMPAGLIFLOZIN 10 MG: 10 TABLET, FILM COATED ORAL at 08:29

## 2024-02-10 RX ADMIN — BISMUTH SUBSALICYLATE 524 MG: 262 TABLET, CHEWABLE ORAL at 11:58

## 2024-02-10 RX ADMIN — IBUPROFEN 400 MG: 400 TABLET ORAL at 00:32

## 2024-02-10 NOTE — PLAN OF CARE
Goal Outcome Evaluation:   Alert and oriented and pleasant with staff. x2Max assist for Transfers and ambulation. X2 admin PRN pain medication with relief of symptoms noted. No other significant changes noted this shift. Sitting up in bed call light in reach.

## 2024-02-10 NOTE — PLAN OF CARE
Goal Outcome Evaluation:           Progress: no change  Outcome Evaluation: Patient is alert and oriented x4, able to make needs known to staff.  Has been medicated x1 this shift with prn oxycodone and motrin, see emar.  Rsd. states medication effective.  Refuses to use bedpan this shift and has been incontinent of bowels.  Urinal remains at bedside.  Activity encouraged this shift, Rsd. has attempted to pull self up in bed with overhead trapeze bar.  Has not ambulated or gotten out of bed this shift, Refuses to be turned, but does shift weight frequently while in bed.  Refuses bed alert.  Bowels formed this shift.  Call light and personal items within reach, Rsd. reminded to use call light for assistance, verbalizes understanding.  WIll continue to monitor and notify on-coming staff.  Current plan of care remains in place at this time.

## 2024-02-10 NOTE — PROGRESS NOTES
[facility]   Hospitalist Progress Note  Date: 2/10/2024  Patient Name: Jose Shaikh  : 1960  MRN: [id number]  Date of admission: 2023      Subjective   Subjective     Chief Complaint: Weakness    Summary: Jose Shaikh is a 63 y.o. male  initially hospitalized on 9/10/2023, prolonged hospitalization for treatment of management of generalized weakness, deconditioning, with acute issues of diarrhea, C. difficile negative.  Diarrhea improved.  Had small hematoma after ambulating on his foot.  Unfortunately with exhaustive efforts to try to place this gentleman after 39 days of hospitalization, we are unable to find a facility that will accept him.  He has no further acute needs or requirements for inpatient monitoring and management, his labs and vitals are stable, he is tolerating oral intake, and has been refusing turns and repositionings and other interventions with nursing staff despite recommendations.  Patient discharged in hemodynamically stable condition on 10/19/2023 to follow-up with PCP within 1 week. Unfortunately we cannot solve all of his social issues during this hospitalization due to lack of resources and participation from the patient's perspective despite all our efforts.  Patient has a financial means of making arrangements at home.  Patient appealed his discharge.  Lost his appeal.  LCD: 10/20/23, PFL beginning: 10/21/23 @ 12pm.  Due to an inability to place the patient in a.m. nursing home he has been placed in our skilled nursing facility until arrangements can be made or until he is able to care for himself.      Interval Followup: 2/10/2024    Discussed with patient.  Recently started Ozempic.  He he thinks it is helping him lose weight.  He still expresses interest in returning home.    Vital signs stable  Glucose 117-190 range       Review of systems: All systems reviewed and negative except what is noted above in interval follow-up   Objective   Objective     Vitals:    Temp:  [97.6 °F (36.4 °C)-97.9 °F (36.6 °C)] 97.9 °F (36.6 °C)  Heart Rate:  [73-84] 73  Resp:  [18-20] 18  BP: (116-123)/(52-57) 123/52    Physical Exam   Constitutional: Awake alert oriented no acute distress  Respiratory: No wheeze  GI: Abdomen: Obese  Neuro/psych:  Calm mood.  No dysarthria.  Extremities: Some lower extremity edema noted    Result Review    Result Review:  I have personally reviewed the results from the time of this admission to 2/10/2024 16:57 EST and agree with these findings:  [x]  Laboratory  [x]  Microbiology  []  Radiology  []  EKG/Telemetry   []  Cardiology/Vascular   []  Pathology  []  Old records  []  Other:    Assessment & Plan   Assessment / Plan   Assessment:  Hospital-acquired weakness  Influenza A  C. difficile diarrhea treated  Generalized weakness  Deconditioning  DM (Kami Garcia and PCP)  HTN  PAF (on Eliquis; previously seen Dr. 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  Consider uptitrating 4-6 weeks after starting.  Completed course of Tamiflu  As needed Lasix  Podiatry consultation for toenails.   Continue basal insulin and SSI per protocol titrate as needed  Continue Eliquis for stroke prophylaxis  Continue daily PT and OT  Continue current pain meds   Had C. difficile diarrhea few weeks ago completed course of oral vancomycin  Continue probiotics  Monitor hemodynamics and avoid hypotension/dehydration.  Continue other treatment as ordered  Discussed with RN    DVT prophylaxis:  Medical DVT prophylaxis orders are present.    CODE STATUS:   Code Status (Patient has no pulse and is not breathing): CPR (Attempt  to Resuscitate)  Medical Interventions (Patient has pulse or is breathing): Full Support

## 2024-02-11 PROCEDURE — 63710000001 INSULIN DETEMIR PER 5 UNITS: Performed by: PHYSICIAN ASSISTANT

## 2024-02-11 PROCEDURE — 63710000001 INSULIN LISPRO (HUMAN) PER 5 UNITS: Performed by: INTERNAL MEDICINE

## 2024-02-11 PROCEDURE — 82948 REAGENT STRIP/BLOOD GLUCOSE: CPT

## 2024-02-11 RX ADMIN — LISINOPRIL 2.5 MG: 2.5 TABLET ORAL at 08:14

## 2024-02-11 RX ADMIN — Medication 500 MG: at 20:31

## 2024-02-11 RX ADMIN — ATORVASTATIN CALCIUM 10 MG: 10 TABLET, FILM COATED ORAL at 20:31

## 2024-02-11 RX ADMIN — BACLOFEN 10 MG: 10 TABLET ORAL at 23:32

## 2024-02-11 RX ADMIN — SERTRALINE HYDROCHLORIDE 25 MG: 25 TABLET ORAL at 20:30

## 2024-02-11 RX ADMIN — IBUPROFEN 400 MG: 400 TABLET ORAL at 17:08

## 2024-02-11 RX ADMIN — INSULIN LISPRO 4 UNITS: 100 INJECTION, SOLUTION INTRAVENOUS; SUBCUTANEOUS at 17:33

## 2024-02-11 RX ADMIN — CYANOCOBALAMIN TAB 500 MCG 1000 MCG: 500 TAB at 08:14

## 2024-02-11 RX ADMIN — BACLOFEN 10 MG: 10 TABLET ORAL at 10:49

## 2024-02-11 RX ADMIN — HYDROCORTISONE: 25 CREAM TOPICAL at 20:31

## 2024-02-11 RX ADMIN — OXYCODONE AND ACETAMINOPHEN 1 TABLET: 7.5; 325 TABLET ORAL at 22:07

## 2024-02-11 RX ADMIN — INSULIN LISPRO 4 UNITS: 100 INJECTION, SOLUTION INTRAVENOUS; SUBCUTANEOUS at 11:59

## 2024-02-11 RX ADMIN — INSULIN LISPRO 4 UNITS: 100 INJECTION, SOLUTION INTRAVENOUS; SUBCUTANEOUS at 20:31

## 2024-02-11 RX ADMIN — OXYCODONE AND ACETAMINOPHEN 1 TABLET: 7.5; 325 TABLET ORAL at 03:59

## 2024-02-11 RX ADMIN — Medication 500 MG: at 08:14

## 2024-02-11 RX ADMIN — APIXABAN 5 MG: 5 TABLET, FILM COATED ORAL at 08:14

## 2024-02-11 RX ADMIN — FERROUS SULFATE TAB 325 MG (65 MG ELEMENTAL FE) 325 MG: 325 (65 FE) TAB at 08:15

## 2024-02-11 RX ADMIN — PREGABALIN 100 MG: 100 CAPSULE ORAL at 08:14

## 2024-02-11 RX ADMIN — INSULIN DETEMIR 40 UNITS: 100 INJECTION, SOLUTION SUBCUTANEOUS at 08:13

## 2024-02-11 RX ADMIN — APIXABAN 5 MG: 5 TABLET, FILM COATED ORAL at 20:31

## 2024-02-11 RX ADMIN — EMPAGLIFLOZIN 10 MG: 10 TABLET, FILM COATED ORAL at 08:15

## 2024-02-11 RX ADMIN — INSULIN DETEMIR 40 UNITS: 100 INJECTION, SOLUTION SUBCUTANEOUS at 20:31

## 2024-02-11 RX ADMIN — Medication 10 MG: at 20:30

## 2024-02-11 RX ADMIN — PREGABALIN 100 MG: 100 CAPSULE ORAL at 20:30

## 2024-02-11 RX ADMIN — PREGABALIN 100 MG: 100 CAPSULE ORAL at 16:36

## 2024-02-11 RX ADMIN — OXYCODONE AND ACETAMINOPHEN 1 TABLET: 7.5; 325 TABLET ORAL at 09:31

## 2024-02-11 NOTE — PLAN OF CARE
Goal Outcome Evaluation:           Progress: no change    Outcome Evaluation: Patient is alert and oriented x4, able to make needs known to staff.  Has been medicated x1 this shift with prn oxycodone, motrin baclofen, see emar.  Rsd. states medication effective.  Refuses to use bedpan this shift and has been incontinent of bowels.  Urinal remains at bedside.  Activity encouraged this shift, Rsd. has attempted to pull self up in bed with overhead trapeze bar.  Has not ambulated or gotten out of bed this shift, Refuses to be turned, but does shift weight frequently while in bed.  Refuses bed alert.  Bowels formed this shift.  Call light and personal items within reach, Rsd. reminded to use call light for assistance, verbalizes understanding.  WIll continue to monitor and notify on-coming staff.  Current plan of care remains in place at this time.

## 2024-02-11 NOTE — PLAN OF CARE
Goal Outcome Evaluation:   Alert and Oriented and pleasant with staff. X2 Max assist for transfers and ambulation. X2 admin PRN pain medication with relief of symptoms noted this shift. No other significant changes noted, sitting up in bed, call light in reach.

## 2024-02-12 VITALS
TEMPERATURE: 98 F | HEIGHT: 74 IN | RESPIRATION RATE: 18 BRPM | SYSTOLIC BLOOD PRESSURE: 104 MMHG | BODY MASS INDEX: 40.43 KG/M2 | HEART RATE: 83 BPM | DIASTOLIC BLOOD PRESSURE: 65 MMHG | WEIGHT: 315 LBS | OXYGEN SATURATION: 95 %

## 2024-02-12 LAB
GLUCOSE BLDC GLUCOMTR-MCNC: 120 MG/DL (ref 70–99)
GLUCOSE BLDC GLUCOMTR-MCNC: 122 MG/DL (ref 70–99)
GLUCOSE BLDC GLUCOMTR-MCNC: 142 MG/DL (ref 70–99)
GLUCOSE BLDC GLUCOMTR-MCNC: 145 MG/DL (ref 70–99)
GLUCOSE BLDC GLUCOMTR-MCNC: 151 MG/DL (ref 70–99)
GLUCOSE BLDC GLUCOMTR-MCNC: 165 MG/DL (ref 70–99)
GLUCOSE BLDC GLUCOMTR-MCNC: 166 MG/DL (ref 70–99)
GLUCOSE BLDC GLUCOMTR-MCNC: 171 MG/DL (ref 70–99)
GLUCOSE BLDC GLUCOMTR-MCNC: 181 MG/DL (ref 70–99)
GLUCOSE BLDC GLUCOMTR-MCNC: 192 MG/DL (ref 70–99)
GLUCOSE BLDC GLUCOMTR-MCNC: 98 MG/DL (ref 70–99)
SARS-COV-2 RNA RESP QL NAA+PROBE: NOT DETECTED

## 2024-02-12 PROCEDURE — 82948 REAGENT STRIP/BLOOD GLUCOSE: CPT | Performed by: INTERNAL MEDICINE

## 2024-02-12 PROCEDURE — 87635 SARS-COV-2 COVID-19 AMP PRB: CPT | Performed by: PHYSICIAN ASSISTANT

## 2024-02-12 PROCEDURE — 63710000001 INSULIN DETEMIR PER 5 UNITS: Performed by: PHYSICIAN ASSISTANT

## 2024-02-12 PROCEDURE — 63710000001 INSULIN LISPRO (HUMAN) PER 5 UNITS: Performed by: INTERNAL MEDICINE

## 2024-02-12 PROCEDURE — 82948 REAGENT STRIP/BLOOD GLUCOSE: CPT

## 2024-02-12 RX ADMIN — INSULIN LISPRO 8 UNITS: 100 INJECTION, SOLUTION INTRAVENOUS; SUBCUTANEOUS at 22:54

## 2024-02-12 RX ADMIN — LISINOPRIL 2.5 MG: 2.5 TABLET ORAL at 09:04

## 2024-02-12 RX ADMIN — Medication 500 MG: at 22:30

## 2024-02-12 RX ADMIN — Medication 10 MG: at 22:27

## 2024-02-12 RX ADMIN — Medication 500 MG: at 09:03

## 2024-02-12 RX ADMIN — INSULIN DETEMIR 40 UNITS: 100 INJECTION, SOLUTION SUBCUTANEOUS at 22:31

## 2024-02-12 RX ADMIN — EMPAGLIFLOZIN 10 MG: 10 TABLET, FILM COATED ORAL at 09:04

## 2024-02-12 RX ADMIN — APIXABAN 5 MG: 5 TABLET, FILM COATED ORAL at 22:28

## 2024-02-12 RX ADMIN — HYDROCORTISONE: 25 CREAM TOPICAL at 22:32

## 2024-02-12 RX ADMIN — IBUPROFEN 400 MG: 400 TABLET ORAL at 12:40

## 2024-02-12 RX ADMIN — ATORVASTATIN CALCIUM 10 MG: 10 TABLET, FILM COATED ORAL at 22:30

## 2024-02-12 RX ADMIN — PREGABALIN 100 MG: 100 CAPSULE ORAL at 09:04

## 2024-02-12 RX ADMIN — OXYCODONE AND ACETAMINOPHEN 1 TABLET: 7.5; 325 TABLET ORAL at 22:54

## 2024-02-12 RX ADMIN — BACLOFEN 10 MG: 10 TABLET ORAL at 10:19

## 2024-02-12 RX ADMIN — PREGABALIN 100 MG: 100 CAPSULE ORAL at 16:56

## 2024-02-12 RX ADMIN — OXYCODONE AND ACETAMINOPHEN 1 TABLET: 7.5; 325 TABLET ORAL at 16:56

## 2024-02-12 RX ADMIN — SERTRALINE HYDROCHLORIDE 25 MG: 25 TABLET ORAL at 22:30

## 2024-02-12 RX ADMIN — PREGABALIN 100 MG: 100 CAPSULE ORAL at 22:28

## 2024-02-12 RX ADMIN — OXYCODONE AND ACETAMINOPHEN 1 TABLET: 7.5; 325 TABLET ORAL at 09:07

## 2024-02-12 RX ADMIN — APIXABAN 5 MG: 5 TABLET, FILM COATED ORAL at 09:04

## 2024-02-12 RX ADMIN — INSULIN DETEMIR 40 UNITS: 100 INJECTION, SOLUTION SUBCUTANEOUS at 09:03

## 2024-02-12 RX ADMIN — CYANOCOBALAMIN TAB 500 MCG 1000 MCG: 500 TAB at 09:03

## 2024-02-12 RX ADMIN — FERROUS SULFATE TAB 325 MG (65 MG ELEMENTAL FE) 325 MG: 325 (65 FE) TAB at 09:04

## 2024-02-12 RX ADMIN — BACLOFEN 10 MG: 10 TABLET ORAL at 22:27

## 2024-02-12 NOTE — PROGRESS NOTES
"Nursing Facility Medication Regimen Review    Potentially Clinically Significant Medication Issues during this review: []Identified   [x]Not identified    Provider acknowledgement required?   []Yes   [x]No    Jose Shaikh is a 63 y.o.male admitted by Max Mehta MD , on 11/6/2023  1:34 PM , for Debility [R53.81]    Visit Vitals  /67 (BP Location: Left arm, Patient Position: Lying)   Pulse 72   Temp 97.7 °F (36.5 °C) (Oral)   Resp 18   Ht 188 cm (74.02\")   Wt (!) 166 kg (367 lb 1.1 oz)   SpO2 96%   BMI 47.11 kg/m²        Lab Results   Component Value Date    GLUCOSE 101 (H) 01/31/2024    BUN 14 01/31/2024    CREATININE 0.52 (L) 01/31/2024    EGFRIFNONA 134 12/20/2021    BCR 26.9 (H) 01/31/2024    K 4.1 01/31/2024    CO2 24.5 01/31/2024    CALCIUM 8.3 (L) 01/31/2024    ALBUMIN 2.9 (L) 01/31/2024    LABIL2 1.3 (L) 08/07/2019    AST 52 (H) 01/31/2024    ALT 41 01/31/2024    WBC 3.34 (L) 01/21/2024    HGB 12.6 (L) 01/21/2024    HCT 40.1 01/21/2024    MCV 83.9 01/21/2024    PLT 70 (L) 01/21/2024        Active Ambulatory Problems     Diagnosis Date Noted    Diabetic ulcer of left heel associated with type 2 DM 07/06/2021    Acute osteomyelitis of left calcaneus  08/18/2021    Diabetic ulcer of left heel associated with type 2 DM 08/18/2021    Diabetic ulcer of right midfoot associated with type 2 DM 08/18/2021    Paroxysmal atrial fibrillation 08/31/2021    Essential hypertension 08/31/2021    Hyperlipidemia LDL goal <100 08/31/2021    Cellulitis and abscess of foot 12/01/2021    High alkaline phosphatase level 12/19/2021    Osteomyelitis 10/01/2022    Onychomycosis 10/02/2022    Onychocryptosis 10/02/2022    Foot pain, bilateral 10/02/2022    Osteomyelitis of foot, right, acute 10/05/2022    Cellulitis of right foot 10/05/2022    Type 2 diabetes mellitus, with long-term current use of insulin 11/08/2022    Class 3 severe obesity due to excess calories with serious comorbidity and body mass index (BMI) of " 45.0 to 49.9 in adult 11/08/2022    Anxiety disorder, unspecified 10/07/2022    Claustrophobia 05/02/2022    Dependence on wheelchair 10/27/2022    Depression, unspecified 05/02/2022    Long term (current) use of anticoagulants 05/02/2022    Long term (current) use of oral hypoglycemic drugs 05/02/2022    Wound of foot 05/02/2022    Ulcer of right foot 05/02/2022    Orthostatic hypotension 10/07/2022    Other chronic osteomyelitis, right ankle and foot 10/07/2022    Personal history of nicotine dependence 05/02/2022    Thrombocytopenia, unspecified 10/07/2022    Unspecified open wound, right foot, initial encounter 04/03/2023    Diabetic foot infection 04/03/2023    Subacute osteomyelitis of right foot 04/06/2023    Right foot pain 05/12/2023    Sepsis 05/12/2023    Onychomycosis 05/22/2023    Foot pain, left 05/22/2023    Impaired mobility and ADLs 06/16/2023    Absence of toe of right foot 07/11/2023    Corns and callosity 07/11/2023    Disability of walking 07/11/2023    Fracture 07/11/2023    Limb swelling 07/11/2023    Polyneuropathy 07/11/2023    Pressure ulcer, stage 1 07/11/2023    Shortness of breath 07/11/2023    Generalized weakness 09/10/2023     Resolved Ambulatory Problems     Diagnosis Date Noted    Lactic acidosis 12/01/2021    Abscess of back 12/20/2021     Past Medical History:   Diagnosis Date    Anxiety and depression     Arthritis     Cancer     Chronic pain     Corns and callus     Difficulty walking     Hammertoe     Ingrown toenail     Obesity     Tinea unguium     Type 2 diabetes mellitus with polyneuropathy         Hospital Medications (active)         Dose Frequency Start End Indication    acetaminophen (TYLENOL) tablet 650 mg 650 mg Every 4 Hours PRN 11/6/2023 -- PRN pain, fever    Admin Instructions: Based on patient request - if ordered for moderate or severe pain, provider allows for administration of a medication prescribed for a lower pain scale.    Do not exceed 4 grams of  acetaminophen in a 24 hr period. Max dose of 2gm for AST/ALT greater than 120 units/L.    If given for pain, use the following pain scale:   Mild Pain = Pain Score of 1-3, CPOT 1-2  Moderate Pain = Pain Score of 4-6, CPOT 3-4  Severe Pain = Pain Score of 7-10, CPOT 5-8     Route: Oral     apixaban (ELIQUIS) tablet 5 mg 5 mg Every 12 Hours Scheduled 11/6/2023 -- Atrial fibrillation    Admin Instructions: Tablet may be crushed and suspended in 60 mL of water or D5W and immediately delivered via NG tube.     Route: Oral     atorvastatin (LIPITOR) tablet 10 mg 10 mg Nightly 11/6/2023 -- Hyperlipidemia    Admin Instructions: Avoid grapefruit juice.     Route: Oral     baclofen (LIORESAL) tablet 10 mg 10 mg 3 Times Daily PRN 2/6/2024 2/20/2024 PRN muscle spasms    Admin Instructions: Take with food if GI upset occurs.     Route: Oral     benzonatate (TESSALON) capsule 100 mg 100 mg 3 Times Daily PRN 1/18/2024 -- PRN cough    Admin Instructions: Do not crush or chew the capsules or tablets. The drug may not work as designed if the capsule or tablet is crushed or chewed. Swallow whole.  Swallow whole.  Do not crush, chew, or open capsule.     Route: Oral     bismuth subsalicylate (PEPTO BISMOL) chewable tablet 524 mg 524 mg 3 Times Daily PRN 2/5/2024 -- PRN indigestion    Admin Instructions: Maximum 4192 mg per 24 hours.     Route: Oral     dextrose (D50W) (25 g/50 mL) IV injection 25 g 25 g Every 15 Minutes PRN 11/6/2023 -- PRN hypoglycemia    Admin Instructions: Blood sugar less than 70; patient has IV access - Unresponsive, NPO or Unable To Safely Swallow     Route: Intravenous     dextrose (GLUTOSE) oral gel 15 g 15 g Every 15 Minutes PRN 11/6/2023 -- PRN hypoglycemia    Admin Instructions: BS<70, Patient Alert, Is not NPO, Can safely swallow.     Route: Oral     empagliflozin (JARDIANCE) tablet 10 mg 10 mg Daily 11/7/2023 -- T2DM    Route: Oral     ferrous sulfate tablet 325 mg 325 mg Daily With Breakfast 11/21/2023 --  Iron supplementation    Admin Instructions: Swallow whole. Do not crush, split, or chew. Take with food if GI upset occurs.     Route: Oral     Cosign for Ordering: Accepted by Max Mehta MD on 11/21/2023 11:34 AM     glucagon (GLUCAGEN) injection 1 mg 1 mg Every 15 Minutes PRN 11/6/2023 -- PRN hypoglycemia    Admin Instructions: Blood Glucose Less Than 70 - Patient Without IV Access - Unresponsive, NPO or Unable To Safely Swallow  Reconstitute powder for injection by adding 1 mL of -supplied sterile diluent or sterile water for injection to a vial containing 1 mg of the drug, to provide solutions containing 1 mg/mL. Shake vial gently to dissolve.     Route: Intramuscular     HOME MED Semaglutide(0.25 or 0.5MG/DOS) (OZEMPIC) solution pen-injector 0.25 mg 0.25 mg Every 7 Days 1/31/2024 -- T2DM    Admin Instructions: Please bring to pharmacy for identification and barcoding.     Notes to Pharmacy: Had medication filled as outpatient and we be using his own pen injector     Route: Subcutaneous     Hydrocortisone (Perianal) (ANUSOL-HC) 2.5 % rectal cream  2 Times Daily 1/31/2024 -- Hemorrhoids    Route: Rectal     ibuprofen (ADVIL,MOTRIN) tablet 400 mg 400 mg 2 Times Daily PRN 1/16/2024 -- PRN pain    Admin Instructions: Based on patient request - if ordered for moderate or severe pain, provider allows for administration of a medication prescribed for a lower pain scale.    Mucous membrane irritant. Do not crush or chew tablet or capsule unless administered through a feeding tube.  If given for pain, use the following pain scale:  Mild Pain = Pain Score of 1-3, CPOT 1-2  Moderate Pain = Pain Score of 4-6, CPOT 3-4  Severe Pain = Pain Score of 7-10, CPOT 5-8     Route: Oral     Cosign for Ordering: Accepted by Max Mehta MD on 1/17/2024 10:44 AM     insulin detemir (LEVEMIR) injection 40 Units 40 Units Daily 1/31/2024 -- T2DM    Admin Instructions: Do not hold basal insulin without an  "order. Consider requesting a dose edit, if needed.        Route: Subcutaneous     Cosign for Ordering: Accepted by Max Mehta MD on 1/31/2024 11:20 AM     insulin detemir (LEVEMIR) injection 40 Units 40 Units Nightly 1/30/2024 -- T2DM    Admin Instructions: Do not hold basal insulin without an order. Consider requesting a dose edit, if needed.        Route: Subcutaneous     Cosign for Ordering: Accepted by Max Mehta MD on 1/31/2024 11:20 AM     Insulin Lispro (humaLOG) injection 4-24 Units 4-24 Units 4 Times Daily Before Meals & Nightly 11/6/2023 -- T2DM    Admin Instructions: Correction Insulin - High Dose (Total Insulin Dose Greater Than 80 units/day, Insulin Resistant Patient, Patient With Infection, Steroid Treatment)    Blood Glucose 150-199 mg/dL - 4 units  Blood Glucose 200-249 mg/dL - 8 units  Blood Glucose 250-299 mg/dL - 12 units  Blood Glucose 300-349 mg/dL - 16 units  Blood Glucose 350-400 mg/dL - 20 units  Blood Glucose Greater Than 400 mg/dL - 24 units & Call Provider   Caution: Look alike/sound alike drug alert     Route: Subcutaneous     lisinopril (PRINIVIL,ZESTRIL) tablet 2.5 mg 2.5 mg Every 24 Hours Scheduled 11/7/2023 -- HTN    Admin Instructions: Hold for SBP less than 100     Route: Oral     melatonin tablet 10 mg 10 mg Nightly 12/8/2023 -- Insomnia    Route: Oral     ondansetron (ZOFRAN) injection 4 mg 4 mg Every 6 Hours PRN 11/6/2023 --     Admin Instructions: If BOTH ondansetron (ZOFRAN) and promethazine (PHENERGAN) are ordered use ondansetron first and THEN promethazine IF ondansetron is ineffective.     Route: Intravenous     ondansetron ODT (ZOFRAN-ODT) disintegrating tablet 4 mg 4 mg Every 6 Hours PRN 11/6/2023 -- PRN N/V    Admin Instructions: \"If multiple N/V medications ordered, use in the following order: Ondansetron, Prochlorperazine, Promethazine. Use PO unless patient refuses or patient unable to swallow.\"  Place on tongue and allow to dissolve.     Route: " Oral     oxyCODONE-acetaminophen (PERCOCET) 7.5-325 MG per tablet 1 tablet 1 tablet Every 6 Hours PRN 12/5/2023 -- Arthritis    Admin Instructions: Based on patient request - if ordered for moderate or severe pain, provider allows for administration of a medication prescribed for a lower pain scale.  [NIKIA]    Do not exceed 4 grams of acetaminophen in a 24 hr period. Max dose of 2gm for AST/ALT greater than 120 units/L        If given for pain, use the following pain scale:   Mild Pain = Pain Score of 1-3, CPOT 1-2  Moderate Pain = Pain Score of 4-6, CPOT 3-4  Severe Pain = Pain Score of 7-10, CPOT 5-8     Route: Oral     pneumococcal conj. 13-valent (PREVNAR-13) vaccine 0.5 mL 0.5 mL During Hospitalization 11/6/2023 -- Immunization    Admin Instructions:      Route: Intramuscular     pregabalin (LYRICA) capsule 100 mg 100 mg 3 Times Daily 11/7/2023 -- Polyneuropathy    Admin Instructions:      Route: Oral     saccharomyces boulardii (FLORASTOR) capsule 500 mg 500 mg 2 Times Daily 2/3/2024 -- Gut health    Route: Oral     sertraline (ZOLOFT) tablet 25 mg 25 mg Nightly 12/8/2023 -- Depression, anxiety    Route: Oral     Cosign for Ordering: Accepted by Jose Maya MD on 12/8/2023  5:26 PM     simethicone (MYLICON) chewable tablet 80 mg 80 mg 4 Times Daily PRN 11/6/2023 -- PRN flatulence    Route: Oral     sodium chloride 0.9 % flush 10 mL 10 mL As Needed 11/6/2023 -- Line care    Route: Intravenous     sodium chloride 0.9 % infusion 40 mL 40 mL As Needed 11/6/2023 -- Line care    Admin Instructions: Following administration of an IV intermittent medication, flush line with 40mL NS at 100mL/hr.     Route: Intravenous     vitamin B-12 (CYANOCOBALAMIN) tablet 1,000 mcg 1,000 mcg Daily 11/7/2023 -- Supplementation    Route: Oral                Psychotropic Medications  Sertraline    Potentially Clinically Significant Medication Issues/PharmD Recommendations:   Psychotropic Medication: No concerns  Opioid Use Review:  PRN percocet available, no concerns noted.   Medication without an appropriate indication: N/A  Untreated indication: N/A  Missing Duration: N/A  Excessive or Inadequate Dose: N/A  Ineffective Drug Therapy: N/A  Medication Adverse Reactions/Consequences: N/A  Medication Monitoring: N/A   Drug Interactions: Ibuprofen/eliquis - increased bleeding risk. Acetaminophen safer alternative to ibuprofen. No signs/symptoms bleeding noted.  Duplicate Therapy: N/A  PTA Medication Omissions (not currently ordered): N/A  Safety: N/A      Additional notes: None      In completing this Drug Regimen Review for [facility] Noah Dixon, have reviewed the electronic medical chart contents, including but not limited to the following: Medication Administration Records (MAR), Prescriber's orders, Progress, nursing and consultants' notes, Laboratory and diagnostic test results, Other sources of information about documented expressions or indications of distress and/or changes in condition.

## 2024-02-12 NOTE — PLAN OF CARE
"Goal Outcome Evaluation:  Plan of Care Reviewed With: patient        Progress: no change  Outcome Evaluation: Alert and oriented x4; able to make needs known to staff. Uses urinal to void; incontinent of bowels. Medicated with Percocet x1 and Baclofen x1 for complaints of pain described as \"popping and cracking\" to left hip with relief. Insulin administered per MAR. Call light within reach; care plan ongoing.                               "

## 2024-02-12 NOTE — PLAN OF CARE
Goal Outcome Evaluation:  Plan of Care Reviewed With: patient        Progress: improving  Outcome Evaluation: resident alert, oriented, able to make needs known to staff. sat on side of the bed this afternoon, did leg exercises, Stood at bedside briefly today, long enough to zero out bedscale, tolerated well. Bloodglucose stable for all 3 meals. Resident door dashed pizza this evening, chips and other snack food items. Medicated with prn percocet x2, prn baclofen x1, prn Motrin x1, effective. Resident pleasant and cooperative.

## 2024-02-13 LAB
GLUCOSE BLDC GLUCOMTR-MCNC: 112 MG/DL (ref 70–99)
GLUCOSE BLDC GLUCOMTR-MCNC: 116 MG/DL (ref 70–99)
GLUCOSE BLDC GLUCOMTR-MCNC: 124 MG/DL (ref 70–99)
GLUCOSE BLDC GLUCOMTR-MCNC: 202 MG/DL (ref 70–99)

## 2024-02-13 PROCEDURE — 63710000001 ONDANSETRON ODT 4 MG TABLET DISPERSIBLE: Performed by: INTERNAL MEDICINE

## 2024-02-13 PROCEDURE — 82948 REAGENT STRIP/BLOOD GLUCOSE: CPT | Performed by: INTERNAL MEDICINE

## 2024-02-13 PROCEDURE — 82948 REAGENT STRIP/BLOOD GLUCOSE: CPT

## 2024-02-13 PROCEDURE — 63710000001 INSULIN DETEMIR PER 5 UNITS: Performed by: PHYSICIAN ASSISTANT

## 2024-02-13 RX ADMIN — APIXABAN 5 MG: 5 TABLET, FILM COATED ORAL at 09:00

## 2024-02-13 RX ADMIN — BACLOFEN 10 MG: 10 TABLET ORAL at 20:53

## 2024-02-13 RX ADMIN — Medication 500 MG: at 09:00

## 2024-02-13 RX ADMIN — CYANOCOBALAMIN TAB 500 MCG 1000 MCG: 500 TAB at 09:00

## 2024-02-13 RX ADMIN — IBUPROFEN 400 MG: 400 TABLET ORAL at 09:01

## 2024-02-13 RX ADMIN — BACLOFEN 10 MG: 10 TABLET ORAL at 07:35

## 2024-02-13 RX ADMIN — PREGABALIN 100 MG: 100 CAPSULE ORAL at 16:51

## 2024-02-13 RX ADMIN — APIXABAN 5 MG: 5 TABLET, FILM COATED ORAL at 20:52

## 2024-02-13 RX ADMIN — BISMUTH SUBSALICYLATE 524 MG: 262 TABLET, CHEWABLE ORAL at 21:35

## 2024-02-13 RX ADMIN — PREGABALIN 100 MG: 100 CAPSULE ORAL at 20:52

## 2024-02-13 RX ADMIN — ATORVASTATIN CALCIUM 10 MG: 10 TABLET, FILM COATED ORAL at 20:52

## 2024-02-13 RX ADMIN — FERROUS SULFATE TAB 325 MG (65 MG ELEMENTAL FE) 325 MG: 325 (65 FE) TAB at 09:00

## 2024-02-13 RX ADMIN — PREGABALIN 100 MG: 100 CAPSULE ORAL at 09:00

## 2024-02-13 RX ADMIN — SERTRALINE HYDROCHLORIDE 25 MG: 25 TABLET ORAL at 20:52

## 2024-02-13 RX ADMIN — Medication 10 MG: at 20:52

## 2024-02-13 RX ADMIN — INSULIN DETEMIR 40 UNITS: 100 INJECTION, SOLUTION SUBCUTANEOUS at 08:59

## 2024-02-13 RX ADMIN — ONDANSETRON 4 MG: 4 TABLET, ORALLY DISINTEGRATING ORAL at 21:35

## 2024-02-13 RX ADMIN — OXYCODONE AND ACETAMINOPHEN 1 TABLET: 7.5; 325 TABLET ORAL at 12:29

## 2024-02-13 RX ADMIN — INSULIN DETEMIR 40 UNITS: 100 INJECTION, SOLUTION SUBCUTANEOUS at 20:51

## 2024-02-13 RX ADMIN — Medication 500 MG: at 20:52

## 2024-02-13 RX ADMIN — OXYCODONE AND ACETAMINOPHEN 1 TABLET: 7.5; 325 TABLET ORAL at 06:26

## 2024-02-13 RX ADMIN — LISINOPRIL 2.5 MG: 2.5 TABLET ORAL at 09:00

## 2024-02-13 RX ADMIN — OXYCODONE AND ACETAMINOPHEN 1 TABLET: 7.5; 325 TABLET ORAL at 20:52

## 2024-02-13 RX ADMIN — EMPAGLIFLOZIN 10 MG: 10 TABLET, FILM COATED ORAL at 09:00

## 2024-02-14 LAB
GLUCOSE BLDC GLUCOMTR-MCNC: 102 MG/DL (ref 70–99)
GLUCOSE BLDC GLUCOMTR-MCNC: 112 MG/DL (ref 70–99)
GLUCOSE BLDC GLUCOMTR-MCNC: 119 MG/DL (ref 70–99)
GLUCOSE BLDC GLUCOMTR-MCNC: 154 MG/DL (ref 70–99)

## 2024-02-14 PROCEDURE — 82948 REAGENT STRIP/BLOOD GLUCOSE: CPT

## 2024-02-14 PROCEDURE — 63710000001 INSULIN LISPRO (HUMAN) PER 5 UNITS: Performed by: INTERNAL MEDICINE

## 2024-02-14 PROCEDURE — 63710000001 INSULIN DETEMIR PER 5 UNITS: Performed by: PHYSICIAN ASSISTANT

## 2024-02-14 RX ADMIN — CYANOCOBALAMIN TAB 500 MCG 1000 MCG: 500 TAB at 09:17

## 2024-02-14 RX ADMIN — OXYCODONE AND ACETAMINOPHEN 1 TABLET: 7.5; 325 TABLET ORAL at 03:47

## 2024-02-14 RX ADMIN — IBUPROFEN 400 MG: 400 TABLET ORAL at 16:36

## 2024-02-14 RX ADMIN — PREGABALIN 100 MG: 100 CAPSULE ORAL at 09:17

## 2024-02-14 RX ADMIN — APIXABAN 5 MG: 5 TABLET, FILM COATED ORAL at 09:17

## 2024-02-14 RX ADMIN — LISINOPRIL 2.5 MG: 2.5 TABLET ORAL at 09:17

## 2024-02-14 RX ADMIN — INSULIN LISPRO 4 UNITS: 100 INJECTION, SOLUTION INTRAVENOUS; SUBCUTANEOUS at 21:21

## 2024-02-14 RX ADMIN — IBUPROFEN 400 MG: 400 TABLET ORAL at 01:40

## 2024-02-14 RX ADMIN — INSULIN DETEMIR 40 UNITS: 100 INJECTION, SOLUTION SUBCUTANEOUS at 09:17

## 2024-02-14 RX ADMIN — PREGABALIN 100 MG: 100 CAPSULE ORAL at 21:20

## 2024-02-14 RX ADMIN — BACLOFEN 10 MG: 10 TABLET ORAL at 04:59

## 2024-02-14 RX ADMIN — BACLOFEN 10 MG: 10 TABLET ORAL at 21:20

## 2024-02-14 RX ADMIN — INSULIN LISPRO 4 UNITS: 100 INJECTION, SOLUTION INTRAVENOUS; SUBCUTANEOUS at 12:42

## 2024-02-14 RX ADMIN — Medication 10 MG: at 21:20

## 2024-02-14 RX ADMIN — OXYCODONE AND ACETAMINOPHEN 1 TABLET: 7.5; 325 TABLET ORAL at 19:43

## 2024-02-14 RX ADMIN — EMPAGLIFLOZIN 10 MG: 10 TABLET, FILM COATED ORAL at 09:17

## 2024-02-14 RX ADMIN — BACLOFEN 10 MG: 10 TABLET ORAL at 13:15

## 2024-02-14 RX ADMIN — FERROUS SULFATE TAB 325 MG (65 MG ELEMENTAL FE) 325 MG: 325 (65 FE) TAB at 09:17

## 2024-02-14 RX ADMIN — SERTRALINE HYDROCHLORIDE 25 MG: 25 TABLET ORAL at 21:21

## 2024-02-14 RX ADMIN — Medication 500 MG: at 10:13

## 2024-02-14 RX ADMIN — Medication 500 MG: at 21:20

## 2024-02-14 RX ADMIN — INSULIN DETEMIR 40 UNITS: 100 INJECTION, SOLUTION SUBCUTANEOUS at 21:20

## 2024-02-14 RX ADMIN — ATORVASTATIN CALCIUM 10 MG: 10 TABLET, FILM COATED ORAL at 21:20

## 2024-02-14 RX ADMIN — APIXABAN 5 MG: 5 TABLET, FILM COATED ORAL at 21:20

## 2024-02-14 RX ADMIN — PREGABALIN 100 MG: 100 CAPSULE ORAL at 16:36

## 2024-02-14 RX ADMIN — OXYCODONE AND ACETAMINOPHEN 1 TABLET: 7.5; 325 TABLET ORAL at 10:13

## 2024-02-14 NOTE — PLAN OF CARE
Goal Outcome Evaluation:  Plan of Care Reviewed With: patient        Progress: no change  Outcome Evaluation: Resident alert, oriented, able to make needs known to staff. sat on side of the bed today x1, states will attempt to get up to chair tomorrow. Bloodglucose stable for all 3 meals. Medicated with prn percocetx1, prn baclofen x1, prn ibuprofen x1, effective. resident pleasant and cooperative.

## 2024-02-15 LAB
GLUCOSE BLDC GLUCOMTR-MCNC: 122 MG/DL (ref 70–99)
GLUCOSE BLDC GLUCOMTR-MCNC: 135 MG/DL (ref 70–99)
GLUCOSE BLDC GLUCOMTR-MCNC: 154 MG/DL (ref 70–99)
GLUCOSE BLDC GLUCOMTR-MCNC: 184 MG/DL (ref 70–99)
SARS-COV-2 RNA RESP QL NAA+PROBE: NOT DETECTED

## 2024-02-15 PROCEDURE — 87635 SARS-COV-2 COVID-19 AMP PRB: CPT | Performed by: PHYSICIAN ASSISTANT

## 2024-02-15 PROCEDURE — 63710000001 INSULIN LISPRO (HUMAN) PER 5 UNITS: Performed by: INTERNAL MEDICINE

## 2024-02-15 PROCEDURE — 82948 REAGENT STRIP/BLOOD GLUCOSE: CPT | Performed by: INTERNAL MEDICINE

## 2024-02-15 PROCEDURE — 82948 REAGENT STRIP/BLOOD GLUCOSE: CPT

## 2024-02-15 PROCEDURE — 63710000001 INSULIN DETEMIR PER 5 UNITS: Performed by: PHYSICIAN ASSISTANT

## 2024-02-15 RX ADMIN — IBUPROFEN 400 MG: 400 TABLET ORAL at 10:53

## 2024-02-15 RX ADMIN — OXYCODONE AND ACETAMINOPHEN 1 TABLET: 7.5; 325 TABLET ORAL at 08:56

## 2024-02-15 RX ADMIN — LISINOPRIL 2.5 MG: 2.5 TABLET ORAL at 08:53

## 2024-02-15 RX ADMIN — OXYCODONE AND ACETAMINOPHEN 1 TABLET: 7.5; 325 TABLET ORAL at 20:40

## 2024-02-15 RX ADMIN — PREGABALIN 100 MG: 100 CAPSULE ORAL at 08:52

## 2024-02-15 RX ADMIN — INSULIN LISPRO 4 UNITS: 100 INJECTION, SOLUTION INTRAVENOUS; SUBCUTANEOUS at 12:17

## 2024-02-15 RX ADMIN — PREGABALIN 100 MG: 100 CAPSULE ORAL at 16:44

## 2024-02-15 RX ADMIN — EMPAGLIFLOZIN 10 MG: 10 TABLET, FILM COATED ORAL at 08:53

## 2024-02-15 RX ADMIN — HYDROCORTISONE: 25 CREAM TOPICAL at 08:53

## 2024-02-15 RX ADMIN — Medication 10 MG: at 20:34

## 2024-02-15 RX ADMIN — APIXABAN 5 MG: 5 TABLET, FILM COATED ORAL at 08:53

## 2024-02-15 RX ADMIN — SERTRALINE HYDROCHLORIDE 25 MG: 25 TABLET ORAL at 20:34

## 2024-02-15 RX ADMIN — APIXABAN 5 MG: 5 TABLET, FILM COATED ORAL at 20:34

## 2024-02-15 RX ADMIN — INSULIN DETEMIR 40 UNITS: 100 INJECTION, SOLUTION SUBCUTANEOUS at 08:52

## 2024-02-15 RX ADMIN — FERROUS SULFATE TAB 325 MG (65 MG ELEMENTAL FE) 325 MG: 325 (65 FE) TAB at 08:53

## 2024-02-15 RX ADMIN — CYANOCOBALAMIN TAB 500 MCG 1000 MCG: 500 TAB at 08:52

## 2024-02-15 RX ADMIN — BACLOFEN 10 MG: 10 TABLET ORAL at 16:44

## 2024-02-15 RX ADMIN — INSULIN LISPRO 4 UNITS: 100 INJECTION, SOLUTION INTRAVENOUS; SUBCUTANEOUS at 20:34

## 2024-02-15 RX ADMIN — Medication 500 MG: at 20:34

## 2024-02-15 RX ADMIN — BACLOFEN 10 MG: 10 TABLET ORAL at 05:37

## 2024-02-15 RX ADMIN — ATORVASTATIN CALCIUM 10 MG: 10 TABLET, FILM COATED ORAL at 20:34

## 2024-02-15 RX ADMIN — PREGABALIN 100 MG: 100 CAPSULE ORAL at 20:34

## 2024-02-15 RX ADMIN — OXYCODONE AND ACETAMINOPHEN 1 TABLET: 7.5; 325 TABLET ORAL at 02:58

## 2024-02-15 RX ADMIN — Medication 500 MG: at 08:53

## 2024-02-15 RX ADMIN — INSULIN DETEMIR 40 UNITS: 100 INJECTION, SOLUTION SUBCUTANEOUS at 20:34

## 2024-02-15 RX ADMIN — INSULIN LISPRO 4 UNITS: 100 INJECTION, SOLUTION INTRAVENOUS; SUBCUTANEOUS at 16:44

## 2024-02-15 RX ADMIN — ACETAMINOPHEN 650 MG: 325 TABLET ORAL at 00:35

## 2024-02-15 NOTE — PLAN OF CARE
Goal Outcome Evaluation:  Plan of Care Reviewed With: patient        Progress: improving  Outcome Evaluation: Resident alert, oriented, able to make needs known to staff. Remains bedbound, did not sit at bedside today. did limited amount of leg exercises this afternoon, stated he would do better tomorrow. Medicated with prn percocet x1, prn baclofen x1 and prn Motrin x1, effective. Bloodglucose elevated at lunch, covered with sliding scale. stable for BF and dinner. Received weekly Ozempic today. Resident tolerated well. Resident pleasant and cooperative.

## 2024-02-15 NOTE — PLAN OF CARE
Goal Outcome Evaluation:           Progress: no change  Outcome Evaluation: Patient alert and oriented. Vital signs stable. Complaints of pain once this am, relieved with PRN pain medications. No complaints at this time. Continue plan of care.

## 2024-02-16 LAB
GLUCOSE BLDC GLUCOMTR-MCNC: 138 MG/DL (ref 70–99)
GLUCOSE BLDC GLUCOMTR-MCNC: 145 MG/DL (ref 70–99)
GLUCOSE BLDC GLUCOMTR-MCNC: 172 MG/DL (ref 70–99)
GLUCOSE BLDC GLUCOMTR-MCNC: 176 MG/DL (ref 70–99)
GLUCOSE BLDC GLUCOMTR-MCNC: 281 MG/DL (ref 70–99)

## 2024-02-16 PROCEDURE — 82948 REAGENT STRIP/BLOOD GLUCOSE: CPT

## 2024-02-16 PROCEDURE — 63710000001 INSULIN DETEMIR PER 5 UNITS: Performed by: PHYSICIAN ASSISTANT

## 2024-02-16 PROCEDURE — 63710000001 INSULIN LISPRO (HUMAN) PER 5 UNITS: Performed by: INTERNAL MEDICINE

## 2024-02-16 RX ADMIN — ACETAMINOPHEN 650 MG: 325 TABLET ORAL at 23:23

## 2024-02-16 RX ADMIN — APIXABAN 5 MG: 5 TABLET, FILM COATED ORAL at 08:24

## 2024-02-16 RX ADMIN — BACLOFEN 10 MG: 10 TABLET ORAL at 08:26

## 2024-02-16 RX ADMIN — INSULIN DETEMIR 40 UNITS: 100 INJECTION, SOLUTION SUBCUTANEOUS at 08:24

## 2024-02-16 RX ADMIN — OXYCODONE AND ACETAMINOPHEN 1 TABLET: 7.5; 325 TABLET ORAL at 16:27

## 2024-02-16 RX ADMIN — CYANOCOBALAMIN TAB 500 MCG 1000 MCG: 500 TAB at 08:24

## 2024-02-16 RX ADMIN — INSULIN DETEMIR 40 UNITS: 100 INJECTION, SOLUTION SUBCUTANEOUS at 20:37

## 2024-02-16 RX ADMIN — INSULIN LISPRO 4 UNITS: 100 INJECTION, SOLUTION INTRAVENOUS; SUBCUTANEOUS at 20:36

## 2024-02-16 RX ADMIN — EMPAGLIFLOZIN 10 MG: 10 TABLET, FILM COATED ORAL at 08:24

## 2024-02-16 RX ADMIN — APIXABAN 5 MG: 5 TABLET, FILM COATED ORAL at 20:36

## 2024-02-16 RX ADMIN — PREGABALIN 100 MG: 100 CAPSULE ORAL at 08:24

## 2024-02-16 RX ADMIN — BACLOFEN 10 MG: 10 TABLET ORAL at 20:36

## 2024-02-16 RX ADMIN — OXYCODONE AND ACETAMINOPHEN 1 TABLET: 7.5; 325 TABLET ORAL at 10:28

## 2024-02-16 RX ADMIN — PREGABALIN 100 MG: 100 CAPSULE ORAL at 20:36

## 2024-02-16 RX ADMIN — FERROUS SULFATE TAB 325 MG (65 MG ELEMENTAL FE) 325 MG: 325 (65 FE) TAB at 08:16

## 2024-02-16 RX ADMIN — BACLOFEN 10 MG: 10 TABLET ORAL at 01:05

## 2024-02-16 RX ADMIN — INSULIN LISPRO 12 UNITS: 100 INJECTION, SOLUTION INTRAVENOUS; SUBCUTANEOUS at 17:44

## 2024-02-16 RX ADMIN — Medication 10 MG: at 20:36

## 2024-02-16 RX ADMIN — SERTRALINE HYDROCHLORIDE 25 MG: 25 TABLET ORAL at 20:36

## 2024-02-16 RX ADMIN — Medication 500 MG: at 20:36

## 2024-02-16 RX ADMIN — LISINOPRIL 2.5 MG: 2.5 TABLET ORAL at 08:24

## 2024-02-16 RX ADMIN — OXYCODONE AND ACETAMINOPHEN 1 TABLET: 7.5; 325 TABLET ORAL at 02:44

## 2024-02-16 RX ADMIN — IBUPROFEN 400 MG: 400 TABLET ORAL at 01:37

## 2024-02-16 RX ADMIN — ATORVASTATIN CALCIUM 10 MG: 10 TABLET, FILM COATED ORAL at 20:36

## 2024-02-16 RX ADMIN — PREGABALIN 100 MG: 100 CAPSULE ORAL at 16:27

## 2024-02-16 RX ADMIN — Medication 500 MG: at 08:24

## 2024-02-16 NOTE — SIGNIFICANT NOTE
Wound Eval / Progress Noted    KEO Dominguez     Patient Name: Jose Shaikh  : 1960  MRN: [id number]  Today's Date: 2024                 Admit Date: 2023    Visit Dx:    ICD-10-CM ICD-9-CM   1. Difficulty in walking  R26.2 719.7   2. Type 2 diabetes mellitus with other specified complication, unspecified whether long term insulin use  E11.69 250.80         Debility    Ulcer of right foot    Ulcerated, foot, left, limited to breakdown of skin    Onychomycosis        Past Medical History:   Diagnosis Date    Absence of toe of right foot     Acute osteomyelitis of left calcaneus  2021    Anxiety and depression     Arthritis     Cancer     Chronic pain     STATES HIS PAIN IS 10/10 AAT    Claustrophobia     Corns and callus     Diabetic ulcer of left heel associated with type 2 DM 2021    Diabetic ulcer of left heel associated with type 2 DM 2021    Diabetic ulcer of right midfoot associated with type 2 DM 2021    Difficulty walking     Essential hypertension 2021    Hammertoe     Hyperlipidemia LDL goal <100 2021    Ingrown toenail     Obesity     Paroxysmal atrial fibrillation 2021    Polyneuropathy     Pressure ulcer, stage 1     Tinea unguium     Type 2 diabetes mellitus with polyneuropathy         Past Surgical History:   Procedure Laterality Date    CYST REMOVAL      center of back; benign    EYE SURGERY      INCISION AND DRAINAGE ABSCESS      back    INCISION AND DRAINAGE LEG Right 12/10/2021    Procedure: INCISION AND DRAINAGE LOWER EXTREMITY;  Surgeon: Ash Leyva DPM;  Location: [facility] MAIN OR;  Service: Podiatry;  Laterality: Right;    OTHER SURGICAL HISTORY      Surgical clips left foot    TOE SURGERY Right     Removal of 5th toe    TRANS METATARSAL AMPUTATION Right 2021    Procedure: AMPUTATION TRANS METATARSAL;  Surgeon: Ash Leyva DPM;  Location: [facility] MAIN OR;  Service: Podiatry;  Laterality: Right;    VASCULAR  SURGERY      WRIST SURGERY Left     repair of injury         Physical Assessment:  Wound 01/22/24 1343 Left anterior fourth toe Abrasion (Active)   Wound Image   02/16/24 1031   Dressing Appearance dry;intact 02/16/24 1031   Closure None 02/16/24 1031   Base dry;red 02/16/24 1031   Periwound dry;intact;redness;blanchable 02/16/24 1031   Periwound Temperature warm 02/16/24 1031   Periwound Skin Turgor soft 02/16/24 1031   Edges open 02/16/24 1031   Drainage Amount none 02/16/24 1031   Care, Wound cleansed with;sterile normal saline;honey applied 02/16/24 1031   Dressing Care dressing applied 02/16/24 1031   Periwound Care absorptive dressing applied 02/16/24 1031      Wound Check / Follow-up: Patient seen today for wound follow-up and dressing change. Patient is awake and alert at time of visit and agreeable to assessment. Left fourth toe ulceration remains with dry red crusted tissue today. Cleansed with normal saline and gauze. Periwound tissue is reddened and blanchable. Applied honey then secured with Band-Aid. Recommending to continue current treatment.    Patient denies any other skin issues and the need for further assessment at this time.        Impression: Ulceration to left fourth toe.      Short term goals: Regain skin integrity, skin protection, every other day dressing changes.        Dottie Guevara RN    2/16/2024    13:15 EST

## 2024-02-16 NOTE — PLAN OF CARE
Goal Outcome Evaluation:   Alert and oriented and pleasant with staff. X2 Max assist for transfers and ambulation. X2 Admin PRN pain medication, with relief of symptoms noted.  No significant changes noted this shift. Sitting up in bed, call light in reach.

## 2024-02-16 NOTE — PLAN OF CARE
Goal Outcome Evaluation:  Plan of Care Reviewed With: patient        Progress: no change  Outcome Evaluation: Alert and oriented x4; able to make needs known to staff. Slept in short intervals between care but complaining of increased left hip pain stating that Percocet and Baclofen not helping much. Resident states he is going to discuss pain with MD and request dose and frequency be increased. See MAR for administration of Motrin, Baclofen and Percocet this shift for complaints of pain to left hip and shoulder. Call light within reach; care plan ongoing.

## 2024-02-17 PROCEDURE — 63710000001 INSULIN DETEMIR PER 5 UNITS: Performed by: PHYSICIAN ASSISTANT

## 2024-02-17 PROCEDURE — 82948 REAGENT STRIP/BLOOD GLUCOSE: CPT

## 2024-02-17 PROCEDURE — 63710000001 INSULIN LISPRO (HUMAN) PER 5 UNITS: Performed by: INTERNAL MEDICINE

## 2024-02-17 RX ADMIN — BACLOFEN 10 MG: 10 TABLET ORAL at 13:13

## 2024-02-17 RX ADMIN — Medication 500 MG: at 20:52

## 2024-02-17 RX ADMIN — OXYCODONE AND ACETAMINOPHEN 1 TABLET: 7.5; 325 TABLET ORAL at 08:39

## 2024-02-17 RX ADMIN — INSULIN LISPRO 4 UNITS: 100 INJECTION, SOLUTION INTRAVENOUS; SUBCUTANEOUS at 20:51

## 2024-02-17 RX ADMIN — Medication 500 MG: at 09:27

## 2024-02-17 RX ADMIN — LISINOPRIL 2.5 MG: 2.5 TABLET ORAL at 09:27

## 2024-02-17 RX ADMIN — PREGABALIN 100 MG: 100 CAPSULE ORAL at 20:52

## 2024-02-17 RX ADMIN — IBUPROFEN 400 MG: 400 TABLET ORAL at 14:03

## 2024-02-17 RX ADMIN — PREGABALIN 100 MG: 100 CAPSULE ORAL at 16:32

## 2024-02-17 RX ADMIN — SERTRALINE HYDROCHLORIDE 25 MG: 25 TABLET ORAL at 20:52

## 2024-02-17 RX ADMIN — PREGABALIN 100 MG: 100 CAPSULE ORAL at 09:27

## 2024-02-17 RX ADMIN — INSULIN DETEMIR 40 UNITS: 100 INJECTION, SOLUTION SUBCUTANEOUS at 09:26

## 2024-02-17 RX ADMIN — OXYCODONE AND ACETAMINOPHEN 1 TABLET: 7.5; 325 TABLET ORAL at 01:32

## 2024-02-17 RX ADMIN — EMPAGLIFLOZIN 10 MG: 10 TABLET, FILM COATED ORAL at 09:27

## 2024-02-17 RX ADMIN — INSULIN DETEMIR 40 UNITS: 100 INJECTION, SOLUTION SUBCUTANEOUS at 20:51

## 2024-02-17 RX ADMIN — OXYCODONE AND ACETAMINOPHEN 1 TABLET: 7.5; 325 TABLET ORAL at 23:28

## 2024-02-17 RX ADMIN — Medication 10 MG: at 20:52

## 2024-02-17 RX ADMIN — ATORVASTATIN CALCIUM 10 MG: 10 TABLET, FILM COATED ORAL at 20:52

## 2024-02-17 RX ADMIN — BACLOFEN 10 MG: 10 TABLET ORAL at 04:57

## 2024-02-17 RX ADMIN — FERROUS SULFATE TAB 325 MG (65 MG ELEMENTAL FE) 325 MG: 325 (65 FE) TAB at 09:27

## 2024-02-17 RX ADMIN — CYANOCOBALAMIN TAB 500 MCG 1000 MCG: 500 TAB at 09:27

## 2024-02-17 RX ADMIN — APIXABAN 5 MG: 5 TABLET, FILM COATED ORAL at 20:52

## 2024-02-17 RX ADMIN — APIXABAN 5 MG: 5 TABLET, FILM COATED ORAL at 09:27

## 2024-02-17 NOTE — PLAN OF CARE
Goal Outcome Evaluation:  Plan of Care Reviewed With: patient        Progress: improving  Outcome Evaluation: Resident alert, oriented, able to make needs known to staff. remained in bed this shift. did exercises with PCA. PA to f/u with resident today. resident wanted increase in dose of percocet and increase in frequency for Baclofen. PA ordered increase in Zoloft at hs, no increase in pain meds. Resident irritable most of shift, much better mood in the afternoon and was joking with staff. Medicated with prn percocet x1, prn motrin x1, prn baclofen x1, effective. Doordashed for dinner today in lieu of hospital food. Remains pleasant and cooperative this evening.

## 2024-02-17 NOTE — PLAN OF CARE
Goal Outcome Evaluation:  Plan of Care Reviewed With: patient        Progress: improving  Outcome Evaluation: Alert and oriented x4; able to make needs known to staff. Continues to say Percocet doesn't help with pain much but sleeps in short intervals after taking Percocet and Baclofen. Remains incontinent of bowels; using urinal to void. Resident states bandaid came off toe to left foot on dayshift. Unable to obtain Medihoney to replace;  notified of needed wound care item. Wound has dry bandaid presently; will notify dayshift nurse. See MAR for pain medication administered throughout the shift. Call light within reach; care plan ongoing.

## 2024-02-18 PROCEDURE — 63710000001 INSULIN DETEMIR PER 5 UNITS: Performed by: PHYSICIAN ASSISTANT

## 2024-02-18 PROCEDURE — 99308 SBSQ NF CARE LOW MDM 20: CPT | Performed by: PHYSICIAN ASSISTANT

## 2024-02-18 PROCEDURE — 63710000001 INSULIN LISPRO (HUMAN) PER 5 UNITS: Performed by: INTERNAL MEDICINE

## 2024-02-18 PROCEDURE — 82948 REAGENT STRIP/BLOOD GLUCOSE: CPT

## 2024-02-18 RX ADMIN — BACLOFEN 10 MG: 10 TABLET ORAL at 04:01

## 2024-02-18 RX ADMIN — INSULIN LISPRO 4 UNITS: 100 INJECTION, SOLUTION INTRAVENOUS; SUBCUTANEOUS at 12:12

## 2024-02-18 RX ADMIN — HYDROCORTISONE: 25 CREAM TOPICAL at 20:05

## 2024-02-18 RX ADMIN — PREGABALIN 100 MG: 100 CAPSULE ORAL at 17:12

## 2024-02-18 RX ADMIN — ATORVASTATIN CALCIUM 10 MG: 10 TABLET, FILM COATED ORAL at 20:08

## 2024-02-18 RX ADMIN — Medication 500 MG: at 20:08

## 2024-02-18 RX ADMIN — Medication 500 MG: at 08:14

## 2024-02-18 RX ADMIN — BACLOFEN 10 MG: 10 TABLET ORAL at 22:05

## 2024-02-18 RX ADMIN — SERTRALINE HYDROCHLORIDE 50 MG: 50 TABLET ORAL at 20:10

## 2024-02-18 RX ADMIN — PREGABALIN 100 MG: 100 CAPSULE ORAL at 20:08

## 2024-02-18 RX ADMIN — LISINOPRIL 2.5 MG: 2.5 TABLET ORAL at 08:14

## 2024-02-18 RX ADMIN — FERROUS SULFATE TAB 325 MG (65 MG ELEMENTAL FE) 325 MG: 325 (65 FE) TAB at 08:14

## 2024-02-18 RX ADMIN — INSULIN DETEMIR 40 UNITS: 100 INJECTION, SOLUTION SUBCUTANEOUS at 08:14

## 2024-02-18 RX ADMIN — INSULIN DETEMIR 40 UNITS: 100 INJECTION, SOLUTION SUBCUTANEOUS at 20:05

## 2024-02-18 RX ADMIN — EMPAGLIFLOZIN 10 MG: 10 TABLET, FILM COATED ORAL at 08:14

## 2024-02-18 RX ADMIN — OXYCODONE AND ACETAMINOPHEN 1 TABLET: 7.5; 325 TABLET ORAL at 20:08

## 2024-02-18 RX ADMIN — BACLOFEN 10 MG: 10 TABLET ORAL at 14:22

## 2024-02-18 RX ADMIN — INSULIN LISPRO 4 UNITS: 100 INJECTION, SOLUTION INTRAVENOUS; SUBCUTANEOUS at 20:06

## 2024-02-18 RX ADMIN — CYANOCOBALAMIN TAB 500 MCG 1000 MCG: 500 TAB at 08:13

## 2024-02-18 RX ADMIN — APIXABAN 5 MG: 5 TABLET, FILM COATED ORAL at 08:14

## 2024-02-18 RX ADMIN — OXYCODONE AND ACETAMINOPHEN 1 TABLET: 7.5; 325 TABLET ORAL at 08:14

## 2024-02-18 RX ADMIN — APIXABAN 5 MG: 5 TABLET, FILM COATED ORAL at 20:08

## 2024-02-18 RX ADMIN — PREGABALIN 100 MG: 100 CAPSULE ORAL at 08:14

## 2024-02-18 RX ADMIN — Medication 10 MG: at 20:08

## 2024-02-18 NOTE — PLAN OF CARE
Goal Outcome Evaluation:  Plan of Care Reviewed With: patient        Progress: no change  Outcome Evaluation: Alert and oriented x4; able to make needs known to staff. Medicated for left hip pain with Percocet x1 and Baclofen x1; rested in short intervals. Zoloft 25mg administered at bedtime per MAR; dose not increased as dayshift nurse reported. Will discuss with oncoming nurse this morning. No significant changes this shift. Call light within reach; care plan ongoing.

## 2024-02-18 NOTE — PROGRESS NOTES
[facility]   Hospitalist Progress Note  Date: 2024  Patient Name: Jose Shaikh  : 1960  MRN: [id number]  Date of admission: 2023      Subjective   Subjective     Chief Complaint: Weakness    Summary: Jose Shaikh is a 63 y.o. male  initially hospitalized on 9/10/2023, prolonged hospitalization for treatment of management of generalized weakness, deconditioning, with acute issues of diarrhea, C. difficile negative.  Diarrhea improved.  Had small hematoma after ambulating on his foot.  Unfortunately with exhaustive efforts to try to place this gentleman after 39 days of hospitalization, we are unable to find a facility that will accept him.  He has no further acute needs or requirements for inpatient monitoring and management, his labs and vitals are stable, he is tolerating oral intake, and has been refusing turns and repositionings and other interventions with nursing staff despite recommendations.  Patient discharged in hemodynamically stable condition on 10/19/2023 to follow-up with PCP within 1 week. Unfortunately we cannot solve all of his social issues during this hospitalization due to lack of resources and participation from the patient's perspective despite all our efforts.  Patient has a financial means of making arrangements at home.  Patient appealed his discharge.  Lost his appeal.  LCD: 10/20/23, PFL beginning: 10/21/23 @ 12pm.  Due to an inability to place the patient in a.m. nursing home he has been placed in our skilled nursing facility until arrangements can be made or until he is able to care for himself.      Interval Followup: Patient seen and examined resting comfortably complaining of depression of uptitrated Zoloft.  Is wanting increased doses of pain medications and muscle relaxants I told him current dose of his pain medication was sufficient    Review of systems: All systems reviewed and negative except what is noted above in interval follow-up   Objective    Objective     Vitals:   Temp:  [97.5 °F (36.4 °C)-98.6 °F (37 °C)] 97.5 °F (36.4 °C)  Heart Rate:  [76-81] 76  Resp:  [16-20] 20  BP: (122-124)/(64-67) 124/67    Physical Exam   Constitutional: Awake alert oriented no acute distress  Respiratory: No wheeze  GI: Abdomen: Obese  Neuro/psych:  Calm mood.  No dysarthria.  Extremities: Some lower extremity edema noted    Result Review    Result Review:  I have personally reviewed the results from the time of this admission to 2/18/2024 13:43 EST and agree with these findings:  [x]  Laboratory  [x]  Microbiology  []  Radiology  []  EKG/Telemetry   []  Cardiology/Vascular   []  Pathology  []  Old records  []  Other:    Assessment & Plan   Assessment / Plan   Assessment:  Hospital-acquired weakness  Influenza A  C. difficile diarrhea treated  Generalized weakness  Deconditioning  DM (Kami Garcia and PCP)  HTN  PAF (on Eliquis; previously seen Dr. Duval)  Morbid obesity with BMI 44  Debility with wheelchair dependence  Hx of severe left hip pain  Severe degenerative joint disease of lower extremities  Hx L calcaneus osteomyelitis  Hx R transmetatarsal  Chronic bilateral foot wounds with right transmetatarsal amputation, healing by secondary intention (wound care clinic; podiatrist Gordon)  Thrombocytopenia, resolved      Plan:    Increase Zoloft  No indication for increasing doses of pain medications  Tolerating Ozempic.  Consider uptitrating 4-6 weeks after starting.  Completed course of Tamiflu  As needed Lasix  Podiatry consultation for toenails.   Continue basal insulin and SSI per protocol titrate as needed  Continue Eliquis for stroke prophylaxis  Continue daily PT and OT  Continue current pain meds   Had C. difficile diarrhea few weeks ago completed course of oral vancomycin  Continue probiotics  Monitor hemodynamics and avoid hypotension/dehydration.  Continue other treatment as ordered  Discussed with RN    DVT prophylaxis:  Medical DVT prophylaxis orders are  present.    CODE STATUS:   Code Status (Patient has no pulse and is not breathing): CPR (Attempt to Resuscitate)  Medical Interventions (Patient has pulse or is breathing): Full Support

## 2024-02-18 NOTE — PLAN OF CARE
Goal Outcome Evaluation:  Plan of Care Reviewed With: patient        Progress: no change  Outcome Evaluation: Patient alert and oriented, vital signs stable. Patient c/o hip pain once this shift. PRN medication administered as ordered. Patient incontinent of bowel, uses a urinal. No significant changes at this time.

## 2024-02-19 VITALS
RESPIRATION RATE: 18 BRPM | WEIGHT: 315 LBS | SYSTOLIC BLOOD PRESSURE: 125 MMHG | DIASTOLIC BLOOD PRESSURE: 60 MMHG | OXYGEN SATURATION: 96 % | BODY MASS INDEX: 40.43 KG/M2 | HEIGHT: 74 IN | HEART RATE: 78 BPM | TEMPERATURE: 97.4 F

## 2024-02-19 LAB — SARS-COV-2 RNA RESP QL NAA+PROBE: NOT DETECTED

## 2024-02-19 PROCEDURE — 87635 SARS-COV-2 COVID-19 AMP PRB: CPT | Performed by: PHYSICIAN ASSISTANT

## 2024-02-19 PROCEDURE — 63710000001 INSULIN LISPRO (HUMAN) PER 5 UNITS: Performed by: INTERNAL MEDICINE

## 2024-02-19 PROCEDURE — 63710000001 INSULIN DETEMIR PER 5 UNITS: Performed by: PHYSICIAN ASSISTANT

## 2024-02-19 PROCEDURE — 82948 REAGENT STRIP/BLOOD GLUCOSE: CPT

## 2024-02-19 RX ADMIN — Medication 10 MG: at 21:06

## 2024-02-19 RX ADMIN — BACLOFEN 10 MG: 10 TABLET ORAL at 07:09

## 2024-02-19 RX ADMIN — Medication 500 MG: at 08:25

## 2024-02-19 RX ADMIN — HYDROCORTISONE: 25 CREAM TOPICAL at 21:06

## 2024-02-19 RX ADMIN — OXYCODONE AND ACETAMINOPHEN 1 TABLET: 7.5; 325 TABLET ORAL at 12:54

## 2024-02-19 RX ADMIN — FERROUS SULFATE TAB 325 MG (65 MG ELEMENTAL FE) 325 MG: 325 (65 FE) TAB at 08:25

## 2024-02-19 RX ADMIN — OXYCODONE AND ACETAMINOPHEN 1 TABLET: 7.5; 325 TABLET ORAL at 19:47

## 2024-02-19 RX ADMIN — EMPAGLIFLOZIN 10 MG: 10 TABLET, FILM COATED ORAL at 08:25

## 2024-02-19 RX ADMIN — OXYCODONE AND ACETAMINOPHEN 1 TABLET: 7.5; 325 TABLET ORAL at 02:09

## 2024-02-19 RX ADMIN — APIXABAN 5 MG: 5 TABLET, FILM COATED ORAL at 08:25

## 2024-02-19 RX ADMIN — LISINOPRIL 2.5 MG: 2.5 TABLET ORAL at 08:25

## 2024-02-19 RX ADMIN — SERTRALINE HYDROCHLORIDE 50 MG: 50 TABLET ORAL at 21:06

## 2024-02-19 RX ADMIN — ATORVASTATIN CALCIUM 10 MG: 10 TABLET, FILM COATED ORAL at 21:06

## 2024-02-19 RX ADMIN — PREGABALIN 100 MG: 100 CAPSULE ORAL at 08:25

## 2024-02-19 RX ADMIN — INSULIN LISPRO 8 UNITS: 100 INJECTION, SOLUTION INTRAVENOUS; SUBCUTANEOUS at 21:06

## 2024-02-19 RX ADMIN — APIXABAN 5 MG: 5 TABLET, FILM COATED ORAL at 21:06

## 2024-02-19 RX ADMIN — Medication 500 MG: at 21:06

## 2024-02-19 RX ADMIN — INSULIN DETEMIR 40 UNITS: 100 INJECTION, SOLUTION SUBCUTANEOUS at 08:25

## 2024-02-19 RX ADMIN — BACLOFEN 10 MG: 10 TABLET ORAL at 19:47

## 2024-02-19 RX ADMIN — PREGABALIN 100 MG: 100 CAPSULE ORAL at 16:16

## 2024-02-19 RX ADMIN — INSULIN DETEMIR 40 UNITS: 100 INJECTION, SOLUTION SUBCUTANEOUS at 21:05

## 2024-02-19 RX ADMIN — PREGABALIN 100 MG: 100 CAPSULE ORAL at 21:06

## 2024-02-19 RX ADMIN — IBUPROFEN 400 MG: 400 TABLET ORAL at 09:21

## 2024-02-19 RX ADMIN — INSULIN LISPRO 4 UNITS: 100 INJECTION, SOLUTION INTRAVENOUS; SUBCUTANEOUS at 12:01

## 2024-02-19 RX ADMIN — CYANOCOBALAMIN TAB 500 MCG 1000 MCG: 500 TAB at 08:25

## 2024-02-19 NOTE — PLAN OF CARE
Goal Outcome Evaluation:   Alert and oriented and pleasant with staff. X2 Max assist for transfers and ambulation. X3 admin PRN pain medication, with relief of symptoms noted. No significant changes noted this shift. Sitting up in bed, call light in reach.

## 2024-02-19 NOTE — PLAN OF CARE
Goal Outcome Evaluation:  Plan of Care Reviewed With: patient        Progress: no change  Outcome Evaluation: No significant change. Continues with baclofen and percocet for pain management to left hip. Continue plan of care.

## 2024-02-20 LAB
GLUCOSE BLDC GLUCOMTR-MCNC: 101 MG/DL (ref 70–99)
GLUCOSE BLDC GLUCOMTR-MCNC: 107 MG/DL (ref 70–99)
GLUCOSE BLDC GLUCOMTR-MCNC: 115 MG/DL (ref 70–99)
GLUCOSE BLDC GLUCOMTR-MCNC: 116 MG/DL (ref 70–99)
GLUCOSE BLDC GLUCOMTR-MCNC: 116 MG/DL (ref 70–99)
GLUCOSE BLDC GLUCOMTR-MCNC: 124 MG/DL (ref 70–99)
GLUCOSE BLDC GLUCOMTR-MCNC: 138 MG/DL (ref 70–99)
GLUCOSE BLDC GLUCOMTR-MCNC: 156 MG/DL (ref 70–99)
GLUCOSE BLDC GLUCOMTR-MCNC: 158 MG/DL (ref 70–99)
GLUCOSE BLDC GLUCOMTR-MCNC: 161 MG/DL (ref 70–99)
GLUCOSE BLDC GLUCOMTR-MCNC: 177 MG/DL (ref 70–99)
GLUCOSE BLDC GLUCOMTR-MCNC: 187 MG/DL (ref 70–99)
GLUCOSE BLDC GLUCOMTR-MCNC: 194 MG/DL (ref 70–99)
GLUCOSE BLDC GLUCOMTR-MCNC: 199 MG/DL (ref 70–99)
GLUCOSE BLDC GLUCOMTR-MCNC: 207 MG/DL (ref 70–99)
GLUCOSE BLDC GLUCOMTR-MCNC: 385 MG/DL (ref 70–99)

## 2024-02-20 PROCEDURE — 82948 REAGENT STRIP/BLOOD GLUCOSE: CPT | Performed by: INTERNAL MEDICINE

## 2024-02-20 PROCEDURE — 63710000001 INSULIN LISPRO (HUMAN) PER 5 UNITS: Performed by: INTERNAL MEDICINE

## 2024-02-20 PROCEDURE — 82948 REAGENT STRIP/BLOOD GLUCOSE: CPT

## 2024-02-20 PROCEDURE — 63710000001 INSULIN DETEMIR PER 5 UNITS: Performed by: PHYSICIAN ASSISTANT

## 2024-02-20 RX ADMIN — Medication 500 MG: at 08:48

## 2024-02-20 RX ADMIN — INSULIN LISPRO 4 UNITS: 100 INJECTION, SOLUTION INTRAVENOUS; SUBCUTANEOUS at 07:43

## 2024-02-20 RX ADMIN — PREGABALIN 100 MG: 100 CAPSULE ORAL at 20:22

## 2024-02-20 RX ADMIN — INSULIN LISPRO 4 UNITS: 100 INJECTION, SOLUTION INTRAVENOUS; SUBCUTANEOUS at 11:53

## 2024-02-20 RX ADMIN — ATORVASTATIN CALCIUM 10 MG: 10 TABLET, FILM COATED ORAL at 20:22

## 2024-02-20 RX ADMIN — CYANOCOBALAMIN TAB 500 MCG 1000 MCG: 500 TAB at 08:48

## 2024-02-20 RX ADMIN — OXYCODONE AND ACETAMINOPHEN 1 TABLET: 7.5; 325 TABLET ORAL at 15:46

## 2024-02-20 RX ADMIN — BACLOFEN 10 MG: 10 TABLET ORAL at 04:41

## 2024-02-20 RX ADMIN — Medication 10 MG: at 20:22

## 2024-02-20 RX ADMIN — PREGABALIN 100 MG: 100 CAPSULE ORAL at 08:49

## 2024-02-20 RX ADMIN — INSULIN DETEMIR 40 UNITS: 100 INJECTION, SOLUTION SUBCUTANEOUS at 20:22

## 2024-02-20 RX ADMIN — ACETAMINOPHEN 650 MG: 325 TABLET ORAL at 00:17

## 2024-02-20 RX ADMIN — EMPAGLIFLOZIN 10 MG: 10 TABLET, FILM COATED ORAL at 08:48

## 2024-02-20 RX ADMIN — INSULIN LISPRO 4 UNITS: 100 INJECTION, SOLUTION INTRAVENOUS; SUBCUTANEOUS at 20:21

## 2024-02-20 RX ADMIN — INSULIN DETEMIR 40 UNITS: 100 INJECTION, SOLUTION SUBCUTANEOUS at 08:48

## 2024-02-20 RX ADMIN — IBUPROFEN 400 MG: 400 TABLET ORAL at 02:28

## 2024-02-20 RX ADMIN — SERTRALINE HYDROCHLORIDE 50 MG: 50 TABLET ORAL at 20:22

## 2024-02-20 RX ADMIN — FERROUS SULFATE TAB 325 MG (65 MG ELEMENTAL FE) 325 MG: 325 (65 FE) TAB at 07:43

## 2024-02-20 RX ADMIN — LISINOPRIL 2.5 MG: 2.5 TABLET ORAL at 08:48

## 2024-02-20 RX ADMIN — IBUPROFEN 400 MG: 400 TABLET ORAL at 19:29

## 2024-02-20 RX ADMIN — OXYCODONE AND ACETAMINOPHEN 1 TABLET: 7.5; 325 TABLET ORAL at 08:48

## 2024-02-20 RX ADMIN — APIXABAN 5 MG: 5 TABLET, FILM COATED ORAL at 08:48

## 2024-02-20 RX ADMIN — APIXABAN 5 MG: 5 TABLET, FILM COATED ORAL at 20:22

## 2024-02-20 RX ADMIN — Medication 500 MG: at 20:22

## 2024-02-20 RX ADMIN — OXYCODONE AND ACETAMINOPHEN 1 TABLET: 7.5; 325 TABLET ORAL at 21:57

## 2024-02-20 RX ADMIN — PREGABALIN 100 MG: 100 CAPSULE ORAL at 15:46

## 2024-02-20 NOTE — PLAN OF CARE
Goal Outcome Evaluation:           Progress: no change    Outcome Evaluation: Patient is alert and oriented x4, able to make needs known to staff.  Has been medicated x1 this shift with prn oxycodone, tylenol, motrin, and baclofen, see emar.  Rsd. states medication effective.  Refuses to use bedpan this shift and has been incontinent of bowels.  Urinal remains at bedside.  Activity encouraged this shift, Rsd. has attempted to pull self up in bed with overhead trapeze bar.  Has not ambulated or gotten out of bed this shift, Refuses to be turned, but does shift weight frequently while in bed.  Refuses bed alert.  Bowels formed this shift.  Call light and personal items within reach, Rsd. reminded to use call light for assistance, verbalizes understanding.  WIll continue to monitor and notify on-coming staff.  Current plan of care remains in place at this time.

## 2024-02-20 NOTE — PLAN OF CARE
Goal Outcome Evaluation:   Alert and oriented and pleasant with staff. X2 assist for transfers and ambulation. [id number] PRN pain medication, with relief of symptoms noted. Considerable progress made in sitting up on edge of bed with minimal staff assist this shift. Sitting up in bed, call light in reach.

## 2024-02-21 LAB
GLUCOSE BLDC GLUCOMTR-MCNC: 113 MG/DL (ref 70–99)
GLUCOSE BLDC GLUCOMTR-MCNC: 113 MG/DL (ref 70–99)
GLUCOSE BLDC GLUCOMTR-MCNC: 116 MG/DL (ref 70–99)
GLUCOSE BLDC GLUCOMTR-MCNC: 170 MG/DL (ref 70–99)

## 2024-02-21 PROCEDURE — 82948 REAGENT STRIP/BLOOD GLUCOSE: CPT

## 2024-02-21 PROCEDURE — 63710000001 INSULIN DETEMIR PER 5 UNITS: Performed by: PHYSICIAN ASSISTANT

## 2024-02-21 PROCEDURE — 99309 SBSQ NF CARE MODERATE MDM 30: CPT | Performed by: PHYSICIAN ASSISTANT

## 2024-02-21 PROCEDURE — 82948 REAGENT STRIP/BLOOD GLUCOSE: CPT | Performed by: INTERNAL MEDICINE

## 2024-02-21 PROCEDURE — 63710000001 INSULIN LISPRO (HUMAN) PER 5 UNITS: Performed by: INTERNAL MEDICINE

## 2024-02-21 RX ORDER — AMMONIUM LACTATE 12 G/100G
LOTION TOPICAL 2 TIMES DAILY PRN
Status: DISCONTINUED | OUTPATIENT
Start: 2024-02-21 | End: 2024-03-12

## 2024-02-21 RX ORDER — DIAPER,BRIEF,INFANT-TODD,DISP
1 EACH MISCELLANEOUS DAILY
Status: DISCONTINUED | OUTPATIENT
Start: 2024-02-21 | End: 2024-03-12

## 2024-02-21 RX ORDER — BACLOFEN 10 MG/1
10 TABLET ORAL 3 TIMES DAILY PRN
Status: DISPENSED | OUTPATIENT
Start: 2024-02-21 | End: 2024-03-06

## 2024-02-21 RX ADMIN — INSULIN DETEMIR 40 UNITS: 100 INJECTION, SOLUTION SUBCUTANEOUS at 20:12

## 2024-02-21 RX ADMIN — FERROUS SULFATE TAB 325 MG (65 MG ELEMENTAL FE) 325 MG: 325 (65 FE) TAB at 08:14

## 2024-02-21 RX ADMIN — BACITRACIN 0.9 G: 500 OINTMENT TOPICAL at 17:48

## 2024-02-21 RX ADMIN — IBUPROFEN 400 MG: 400 TABLET ORAL at 10:07

## 2024-02-21 RX ADMIN — Medication 500 MG: at 08:14

## 2024-02-21 RX ADMIN — Medication 10 MG: at 20:13

## 2024-02-21 RX ADMIN — OXYCODONE AND ACETAMINOPHEN 1 TABLET: 7.5; 325 TABLET ORAL at 23:12

## 2024-02-21 RX ADMIN — OXYCODONE AND ACETAMINOPHEN 1 TABLET: 7.5; 325 TABLET ORAL at 06:16

## 2024-02-21 RX ADMIN — EMPAGLIFLOZIN 10 MG: 10 TABLET, FILM COATED ORAL at 08:14

## 2024-02-21 RX ADMIN — INSULIN DETEMIR 40 UNITS: 100 INJECTION, SOLUTION SUBCUTANEOUS at 08:14

## 2024-02-21 RX ADMIN — ATORVASTATIN CALCIUM 10 MG: 10 TABLET, FILM COATED ORAL at 20:13

## 2024-02-21 RX ADMIN — BACLOFEN 10 MG: 10 TABLET ORAL at 20:40

## 2024-02-21 RX ADMIN — Medication 500 MG: at 20:13

## 2024-02-21 RX ADMIN — INSULIN LISPRO 8 UNITS: 100 INJECTION, SOLUTION INTRAVENOUS; SUBCUTANEOUS at 20:12

## 2024-02-21 RX ADMIN — PREGABALIN 100 MG: 100 CAPSULE ORAL at 20:13

## 2024-02-21 RX ADMIN — CYANOCOBALAMIN TAB 500 MCG 1000 MCG: 500 TAB at 08:14

## 2024-02-21 RX ADMIN — APIXABAN 5 MG: 5 TABLET, FILM COATED ORAL at 08:14

## 2024-02-21 RX ADMIN — SERTRALINE HYDROCHLORIDE 50 MG: 50 TABLET ORAL at 20:13

## 2024-02-21 RX ADMIN — BACLOFEN 10 MG: 10 TABLET ORAL at 01:20

## 2024-02-21 RX ADMIN — PREGABALIN 100 MG: 100 CAPSULE ORAL at 08:14

## 2024-02-21 RX ADMIN — PREGABALIN 100 MG: 100 CAPSULE ORAL at 16:30

## 2024-02-21 RX ADMIN — LISINOPRIL 2.5 MG: 2.5 TABLET ORAL at 08:13

## 2024-02-21 RX ADMIN — APIXABAN 5 MG: 5 TABLET, FILM COATED ORAL at 20:12

## 2024-02-21 NOTE — PROGRESS NOTES
[facility]   Hospitalist Progress Note  Date: 2024  Patient Name: Jose Shaikh  : 1960  MRN: [id number]  Date of admission: 2023      Subjective   Subjective     Chief Complaint: Weakness    Summary: Jose Shaikh is a 63 y.o. male  initially hospitalized on 9/10/2023, prolonged hospitalization for treatment of management of generalized weakness, deconditioning, with acute issues of diarrhea, C. difficile negative.  Diarrhea improved.  Had small hematoma after ambulating on his foot.  Unfortunately with exhaustive efforts to try to place this gentleman after 39 days of hospitalization, we are unable to find a facility that will accept him.  He has no further acute needs or requirements for inpatient monitoring and management, his labs and vitals are stable, he is tolerating oral intake, and has been refusing turns and repositionings and other interventions with nursing staff despite recommendations.  Patient discharged in hemodynamically stable condition on 10/19/2023 to follow-up with PCP within 1 week. Unfortunately we cannot solve all of his social issues during this hospitalization due to lack of resources and participation from the patient's perspective despite all our efforts.  Patient has a financial means of making arrangements at home.  Patient appealed his discharge.  Lost his appeal.  LCD: 10/20/23, PFL beginning: 10/21/23 @ 12pm.  Due to an inability to place the patient in a.m. nursing home he has been placed in our skilled nursing facility until arrangements can be made or until he is able to care for himself.      Interval Followup: 2024    Tolerating Ozempic  RN concerned about dry scaly skin lower extremities.  Mild erythema on left calf.  No fevers.  Vital stable  Glucose controlled      Review of systems: All systems reviewed and negative except what is noted above in interval follow-up   Objective   Objective     Vitals:   Temp:  [97.9 °F (36.6 °C)] 97.9 °F (36.6  °C)  Heart Rate:  [74-77] 74  Resp:  [16-18] 18  BP: (113-115)/(57-63) 115/63    Physical Exam   Constitutional: Awake alert oriented no acute distress  Respiratory: No wheeze  GI: Abdomen: Obese  Neuro/psych:  Calm mood.  No dysarthria.  Extremities: Some lower extremity edema noted    Result Review    Result Review:  I have personally reviewed the results from the time of this admission to 2/21/2024 17:32 EST and agree with these findings:  [x]  Laboratory  [x]  Microbiology  []  Radiology  []  EKG/Telemetry   []  Cardiology/Vascular   []  Pathology  []  Old records  []  Other:    Assessment & Plan   Assessment / Plan   Assessment:  Hospital-acquired weakness  Influenza A  C. difficile diarrhea treated  Generalized weakness  Deconditioning  DM (Kami Garcia and PCP)  HTN  PAF (on Eliquis; previously seen Dr. 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  Consider uptitrating 4-6 weeks after starting.  Chronic venous stasis dermatitis/Apply Lac-Hydrin.  Bacitracin to small wound left medial calf.  Completed course of Tamiflu  As needed Lasix  Podiatry consultation for toenails.   Continue basal insulin and SSI per protocol titrate as needed  Continue Eliquis for stroke prophylaxis  Continue daily PT and OT  Continue current pain meds   Had C. difficile diarrhea few weeks ago completed course of oral vancomycin  Continue probiotics  Monitor hemodynamics and avoid hypotension/dehydration.  Continue other treatment as ordered  Discussed with RN    DVT prophylaxis:  Medical DVT prophylaxis orders are present.    CODE STATUS:   Code Status (Patient has no pulse  and is not breathing): CPR (Attempt to Resuscitate)  Medical Interventions (Patient has pulse or is breathing): Full Support

## 2024-02-21 NOTE — PLAN OF CARE
Goal Outcome Evaluation:  Plan of Care Reviewed With: patient        Progress: no change  Outcome Evaluation: Alert and oriented; able to make needs known to staff. Medicated for pain as needed with Motrin, Baclofen and Percocet; see MAR for times. Multiple soft BM's this shift. Call light within reach; care plan ongoing.

## 2024-02-21 NOTE — PLAN OF CARE
Goal Outcome Evaluation:  Plan of Care Reviewed With: patient        Progress: no change  Outcome Evaluation: Alert and oriented x4. Given PRN Ibuprofen at 1007 for c/o left shoulder and left hip pain. Med effective for sholder pain. Refused PRN Percocet offered. See new orders for left lower leg redness and BLE skin ruddiness. No s/s insulin required this shift. Voices needs. Con't current POC.

## 2024-02-22 LAB
GLUCOSE BLDC GLUCOMTR-MCNC: 131 MG/DL (ref 70–99)
GLUCOSE BLDC GLUCOMTR-MCNC: 142 MG/DL (ref 70–99)
GLUCOSE BLDC GLUCOMTR-MCNC: 149 MG/DL (ref 70–99)
GLUCOSE BLDC GLUCOMTR-MCNC: 224 MG/DL (ref 70–99)
GLUCOSE BLDC GLUCOMTR-MCNC: 99 MG/DL (ref 70–99)
SARS-COV-2 RNA RESP QL NAA+PROBE: NOT DETECTED

## 2024-02-22 PROCEDURE — 82948 REAGENT STRIP/BLOOD GLUCOSE: CPT

## 2024-02-22 PROCEDURE — 82948 REAGENT STRIP/BLOOD GLUCOSE: CPT | Performed by: INTERNAL MEDICINE

## 2024-02-22 PROCEDURE — 87635 SARS-COV-2 COVID-19 AMP PRB: CPT | Performed by: PHYSICIAN ASSISTANT

## 2024-02-22 PROCEDURE — 63710000001 INSULIN DETEMIR PER 5 UNITS: Performed by: PHYSICIAN ASSISTANT

## 2024-02-22 RX ADMIN — LISINOPRIL 2.5 MG: 2.5 TABLET ORAL at 08:50

## 2024-02-22 RX ADMIN — ATORVASTATIN CALCIUM 10 MG: 10 TABLET, FILM COATED ORAL at 20:59

## 2024-02-22 RX ADMIN — SERTRALINE HYDROCHLORIDE 50 MG: 50 TABLET ORAL at 20:58

## 2024-02-22 RX ADMIN — OXYCODONE AND ACETAMINOPHEN 1 TABLET: 7.5; 325 TABLET ORAL at 13:57

## 2024-02-22 RX ADMIN — BACLOFEN 10 MG: 10 TABLET ORAL at 13:57

## 2024-02-22 RX ADMIN — INSULIN DETEMIR 40 UNITS: 100 INJECTION, SOLUTION SUBCUTANEOUS at 08:51

## 2024-02-22 RX ADMIN — PREGABALIN 100 MG: 100 CAPSULE ORAL at 08:50

## 2024-02-22 RX ADMIN — ACETAMINOPHEN 650 MG: 325 TABLET ORAL at 12:04

## 2024-02-22 RX ADMIN — Medication: at 20:57

## 2024-02-22 RX ADMIN — BACITRACIN 0.9 G: 500 OINTMENT TOPICAL at 08:50

## 2024-02-22 RX ADMIN — BACLOFEN 10 MG: 10 TABLET ORAL at 04:43

## 2024-02-22 RX ADMIN — Medication 500 MG: at 20:59

## 2024-02-22 RX ADMIN — APIXABAN 5 MG: 5 TABLET, FILM COATED ORAL at 08:50

## 2024-02-22 RX ADMIN — APIXABAN 5 MG: 5 TABLET, FILM COATED ORAL at 20:59

## 2024-02-22 RX ADMIN — Medication 500 MG: at 08:50

## 2024-02-22 RX ADMIN — CYANOCOBALAMIN TAB 500 MCG 1000 MCG: 500 TAB at 08:49

## 2024-02-22 RX ADMIN — INSULIN DETEMIR 40 UNITS: 100 INJECTION, SOLUTION SUBCUTANEOUS at 20:58

## 2024-02-22 RX ADMIN — FERROUS SULFATE TAB 325 MG (65 MG ELEMENTAL FE) 325 MG: 325 (65 FE) TAB at 08:50

## 2024-02-22 RX ADMIN — Medication 10 MG: at 20:58

## 2024-02-22 RX ADMIN — PREGABALIN 100 MG: 100 CAPSULE ORAL at 20:59

## 2024-02-22 RX ADMIN — HYDROCORTISONE: 25 CREAM TOPICAL at 08:51

## 2024-02-22 RX ADMIN — OXYCODONE AND ACETAMINOPHEN 1 TABLET: 7.5; 325 TABLET ORAL at 06:10

## 2024-02-22 RX ADMIN — Medication: at 03:56

## 2024-02-22 RX ADMIN — ACETAMINOPHEN 650 MG: 325 TABLET ORAL at 02:27

## 2024-02-22 RX ADMIN — PREGABALIN 100 MG: 100 CAPSULE ORAL at 17:39

## 2024-02-22 RX ADMIN — EMPAGLIFLOZIN 10 MG: 10 TABLET, FILM COATED ORAL at 08:50

## 2024-02-22 NOTE — PLAN OF CARE
Goal Outcome Evaluation:  Plan of Care Reviewed With: patient        Progress: no change  Outcome Evaluation: No significant changes. Alert and oriented x4; able to make needs known to staff. Weight down 1.2kg this morning. Utilizes urinal to void. Continues to call for pain medication frequently throughout the shift; received Percocet x2, Baclofen x2, and Tylenol. Awake entire shift stating that he slept most of dayshift. Call light within reach; care plan ongoing.

## 2024-02-22 NOTE — PLAN OF CARE
Goal Outcome Evaluation:   No significant changes noted across shift.  Blood sugars maintained w/o need for SSI.     Will continue to monitor.

## 2024-02-23 LAB
GLUCOSE BLDC GLUCOMTR-MCNC: 113 MG/DL (ref 70–99)
GLUCOSE BLDC GLUCOMTR-MCNC: 120 MG/DL (ref 70–99)
GLUCOSE BLDC GLUCOMTR-MCNC: 144 MG/DL (ref 70–99)
GLUCOSE BLDC GLUCOMTR-MCNC: 191 MG/DL (ref 70–99)

## 2024-02-23 PROCEDURE — 82948 REAGENT STRIP/BLOOD GLUCOSE: CPT | Performed by: INTERNAL MEDICINE

## 2024-02-23 PROCEDURE — 82948 REAGENT STRIP/BLOOD GLUCOSE: CPT

## 2024-02-23 PROCEDURE — 63710000001 INSULIN DETEMIR PER 5 UNITS: Performed by: PHYSICIAN ASSISTANT

## 2024-02-23 RX ADMIN — Medication 500 MG: at 10:59

## 2024-02-23 RX ADMIN — APIXABAN 5 MG: 5 TABLET, FILM COATED ORAL at 20:08

## 2024-02-23 RX ADMIN — BACLOFEN 10 MG: 10 TABLET ORAL at 01:59

## 2024-02-23 RX ADMIN — SERTRALINE HYDROCHLORIDE 50 MG: 50 TABLET ORAL at 20:09

## 2024-02-23 RX ADMIN — APIXABAN 5 MG: 5 TABLET, FILM COATED ORAL at 10:59

## 2024-02-23 RX ADMIN — Medication: at 20:07

## 2024-02-23 RX ADMIN — OXYCODONE AND ACETAMINOPHEN 1 TABLET: 7.5; 325 TABLET ORAL at 01:59

## 2024-02-23 RX ADMIN — BACLOFEN 10 MG: 10 TABLET ORAL at 11:49

## 2024-02-23 RX ADMIN — CYANOCOBALAMIN TAB 500 MCG 1000 MCG: 500 TAB at 10:58

## 2024-02-23 RX ADMIN — IBUPROFEN 400 MG: 400 TABLET ORAL at 03:35

## 2024-02-23 RX ADMIN — IBUPROFEN 400 MG: 400 TABLET ORAL at 15:50

## 2024-02-23 RX ADMIN — OXYCODONE AND ACETAMINOPHEN 1 TABLET: 7.5; 325 TABLET ORAL at 11:05

## 2024-02-23 RX ADMIN — ATORVASTATIN CALCIUM 10 MG: 10 TABLET, FILM COATED ORAL at 20:09

## 2024-02-23 RX ADMIN — INSULIN DETEMIR 40 UNITS: 100 INJECTION, SOLUTION SUBCUTANEOUS at 10:57

## 2024-02-23 RX ADMIN — Medication 500 MG: at 20:08

## 2024-02-23 RX ADMIN — BACITRACIN 0.9 G: 500 OINTMENT TOPICAL at 11:01

## 2024-02-23 RX ADMIN — PREGABALIN 100 MG: 100 CAPSULE ORAL at 20:09

## 2024-02-23 RX ADMIN — Medication 10 MG: at 20:08

## 2024-02-23 RX ADMIN — OXYCODONE AND ACETAMINOPHEN 1 TABLET: 7.5; 325 TABLET ORAL at 22:54

## 2024-02-23 RX ADMIN — INSULIN DETEMIR 40 UNITS: 100 INJECTION, SOLUTION SUBCUTANEOUS at 19:49

## 2024-02-23 RX ADMIN — PREGABALIN 100 MG: 100 CAPSULE ORAL at 15:50

## 2024-02-23 RX ADMIN — LISINOPRIL 2.5 MG: 2.5 TABLET ORAL at 10:59

## 2024-02-23 RX ADMIN — FERROUS SULFATE TAB 325 MG (65 MG ELEMENTAL FE) 325 MG: 325 (65 FE) TAB at 10:59

## 2024-02-23 RX ADMIN — PREGABALIN 100 MG: 100 CAPSULE ORAL at 10:59

## 2024-02-23 RX ADMIN — EMPAGLIFLOZIN 10 MG: 10 TABLET, FILM COATED ORAL at 10:59

## 2024-02-23 NOTE — PLAN OF CARE
Goal Outcome Evaluation:   VSS, Aox4, lungs CTA, requiring frequent pain meds, wounds progressing , blood glucose WNL, BM x1 per bedpan.

## 2024-02-23 NOTE — PLAN OF CARE
Goal Outcome Evaluation:  Plan of Care Reviewed With: patient        Progress: improving  Outcome Evaluation: Resident alert, oriented, able to make needs known to staff. Sat on side of bed today and did mild exercises with staff. Refused am meds today insisted on having Motrin, but had not met frequency yet. stated if he can't have Motrin he don't want anythin. told him he could have percocet but refused, stated it does nothing for him. If he can't get Motrin he does not want any pills at all and told this writer to get out and don't come back. aske for am meds later, close to 11am. MD approved for all meds to be given past due date. Asked for percocet als. Resident was medicated with prn percocet x1, prn baclofen x1, and prn ibuprofen x1, effective. Wound nurse here to follow up obn open areas to toe. Resident mood changed later the afternoon and was pleasant and cooperative.

## 2024-02-23 NOTE — SIGNIFICANT NOTE
Wound Eval / Progress Noted    KEO Dominguez     Patient Name: Jose Shaikh  : 1960  MRN: [id number]  Today's Date: 2024                 Admit Date: 2023    Visit Dx:    ICD-10-CM ICD-9-CM   1. Difficulty in walking  R26.2 719.7   2. Type 2 diabetes mellitus with other specified complication, unspecified whether long term insulin use  E11.69 250.80         Debility    Ulcer of right foot    Ulcerated, foot, left, limited to breakdown of skin    Onychomycosis        Past Medical History:   Diagnosis Date    Absence of toe of right foot     Acute osteomyelitis of left calcaneus  2021    Anxiety and depression     Arthritis     Cancer     Chronic pain     STATES HIS PAIN IS 10/10 AAT    Claustrophobia     Corns and callus     Diabetic ulcer of left heel associated with type 2 DM 2021    Diabetic ulcer of left heel associated with type 2 DM 2021    Diabetic ulcer of right midfoot associated with type 2 DM 2021    Difficulty walking     Essential hypertension 2021    Hammertoe     Hyperlipidemia LDL goal <100 2021    Ingrown toenail     Obesity     Paroxysmal atrial fibrillation 2021    Polyneuropathy     Pressure ulcer, stage 1     Tinea unguium     Type 2 diabetes mellitus with polyneuropathy         Past Surgical History:   Procedure Laterality Date    CYST REMOVAL      center of back; benign    EYE SURGERY      INCISION AND DRAINAGE ABSCESS      back    INCISION AND DRAINAGE LEG Right 12/10/2021    Procedure: INCISION AND DRAINAGE LOWER EXTREMITY;  Surgeon: Ash Leyva DPM;  Location: [facility] MAIN OR;  Service: Podiatry;  Laterality: Right;    OTHER SURGICAL HISTORY      Surgical clips left foot    TOE SURGERY Right     Removal of 5th toe    TRANS METATARSAL AMPUTATION Right 2021    Procedure: AMPUTATION TRANS METATARSAL;  Surgeon: Ash Leyva DPM;  Location: [facility] MAIN OR;  Service: Podiatry;  Laterality: Right;    VASCULAR  SURGERY      WRIST SURGERY Left     repair of injury         Physical Assessment:  Wound 01/22/24 1343 Left anterior fourth toe Abrasion (Active)   Wound Image   02/23/24 1015   Dressing Appearance open to air 02/23/24 1015   Closure None 02/23/24 1015   Base scab 02/23/24 1015   Periwound intact;dry;pink 02/23/24 1015   Periwound Temperature warm 02/23/24 1015   Periwound Skin Turgor soft 02/23/24 1015   Edges rolled/closed 02/23/24 1015   Drainage Amount none 02/23/24 1015   Care, Wound cleansed with;sterile normal saline;honey applied 02/23/24 1015   Dressing Care dressing applied;elastic bandage 02/23/24 1015   Periwound Care absorptive dressing applied 02/23/24 1015       Wound 02/01/24 1254 Left distal calf (Active)   Dressing Appearance open to air 02/23/24 1015   Closure None 02/23/24 1015   Base pink;dry 02/23/24 1015   Periwound dry;intact 02/23/24 1015   Periwound Temperature warm 02/23/24 1015   Periwound Skin Turgor soft 02/23/24 1015   Edges rolled/closed 02/23/24 1015   Drainage Amount none 02/23/24 1015   Care, Wound cleansed with;sterile normal saline 02/23/24 1015   Dressing Care open to air 02/23/24 1015   Periwound Care moisturizer applied 02/23/24 1015    Right lower leg    Right residual foot    Wound Check / Follow-up: Patient seen today for a wound follow-up and dressing change. Patient is awake alert and oriented; sitting on the edge of the bed.    Left fourth toe ulceration with slight improvement. Dry crusted tissue covering full wound base. Periwound is soft and intact. Cleansed with NS and applied medi-honey and band-aid. Recommending to continue current care at this time.    Right lower leg with patch of dry scaly skin; no open or drainage. Recommending to provide quality skin care and hygiene daily and application of purple top moisturizer.     Patient denying any other needs at this time.    Impression: ulceration to the left fourth toe, dry skin to the right lower leg    Short term  goals:  regain skin integrity, every other day dressing changes, skin care / hygiene    Karon Bolton RN    2/23/2024    12:23 EST

## 2024-02-24 PROCEDURE — 63710000001 INSULIN DETEMIR PER 5 UNITS: Performed by: PHYSICIAN ASSISTANT

## 2024-02-24 PROCEDURE — 63710000001 ONDANSETRON ODT 4 MG TABLET DISPERSIBLE: Performed by: INTERNAL MEDICINE

## 2024-02-24 PROCEDURE — 82948 REAGENT STRIP/BLOOD GLUCOSE: CPT

## 2024-02-24 RX ADMIN — ONDANSETRON 4 MG: 4 TABLET, ORALLY DISINTEGRATING ORAL at 22:49

## 2024-02-24 RX ADMIN — ACETAMINOPHEN 650 MG: 325 TABLET ORAL at 06:42

## 2024-02-24 RX ADMIN — BACLOFEN 10 MG: 10 TABLET ORAL at 16:18

## 2024-02-24 RX ADMIN — BACLOFEN 10 MG: 10 TABLET ORAL at 08:09

## 2024-02-24 RX ADMIN — LISINOPRIL 2.5 MG: 2.5 TABLET ORAL at 09:40

## 2024-02-24 RX ADMIN — Medication 10 MG: at 20:52

## 2024-02-24 RX ADMIN — ATORVASTATIN CALCIUM 10 MG: 10 TABLET, FILM COATED ORAL at 20:52

## 2024-02-24 RX ADMIN — INSULIN DETEMIR 40 UNITS: 100 INJECTION, SOLUTION SUBCUTANEOUS at 09:41

## 2024-02-24 RX ADMIN — BACLOFEN 10 MG: 10 TABLET ORAL at 00:12

## 2024-02-24 RX ADMIN — Medication 500 MG: at 09:40

## 2024-02-24 RX ADMIN — FERROUS SULFATE TAB 325 MG (65 MG ELEMENTAL FE) 325 MG: 325 (65 FE) TAB at 08:09

## 2024-02-24 RX ADMIN — OXYCODONE AND ACETAMINOPHEN 1 TABLET: 7.5; 325 TABLET ORAL at 18:03

## 2024-02-24 RX ADMIN — CYANOCOBALAMIN TAB 500 MCG 1000 MCG: 500 TAB at 09:41

## 2024-02-24 RX ADMIN — Medication 500 MG: at 20:52

## 2024-02-24 RX ADMIN — PREGABALIN 100 MG: 100 CAPSULE ORAL at 17:00

## 2024-02-24 RX ADMIN — INSULIN DETEMIR 40 UNITS: 100 INJECTION, SOLUTION SUBCUTANEOUS at 20:50

## 2024-02-24 RX ADMIN — BACITRACIN 0.9 G: 500 OINTMENT TOPICAL at 09:41

## 2024-02-24 RX ADMIN — Medication: at 21:08

## 2024-02-24 RX ADMIN — SERTRALINE HYDROCHLORIDE 50 MG: 50 TABLET ORAL at 21:13

## 2024-02-24 RX ADMIN — OXYCODONE AND ACETAMINOPHEN 1 TABLET: 7.5; 325 TABLET ORAL at 10:42

## 2024-02-24 RX ADMIN — APIXABAN 5 MG: 5 TABLET, FILM COATED ORAL at 09:41

## 2024-02-24 RX ADMIN — EMPAGLIFLOZIN 10 MG: 10 TABLET, FILM COATED ORAL at 09:40

## 2024-02-24 RX ADMIN — PREGABALIN 100 MG: 100 CAPSULE ORAL at 09:40

## 2024-02-24 RX ADMIN — APIXABAN 5 MG: 5 TABLET, FILM COATED ORAL at 20:52

## 2024-02-24 RX ADMIN — PREGABALIN 100 MG: 100 CAPSULE ORAL at 20:52

## 2024-02-24 RX ADMIN — SIMETHICONE 80 MG: 80 TABLET, CHEWABLE ORAL at 23:38

## 2024-02-24 NOTE — PLAN OF CARE
"Goal Outcome Evaluation:  Plan of Care Reviewed With: patient        Progress: no change  Outcome Evaluation: Patient is alert and oriented x4. Given PRN Percocet at 1042 for c/o left hip pain rated 10/10. Med somewhat effective.Given PRN Baclofen at 0809 and again at 1618 for c/o muscle spasms. Med effective. Blood glucose readings this shift were 106, 133, and 130. No sliding scale insulin required. Patient still refusing turns unless using bed/pan or when incontinent stating \"I turn when you all clean me up. That's enough.\"  Voices needs. Con't current POC.                               "

## 2024-02-24 NOTE — PLAN OF CARE
Goal Outcome Evaluation:VSS cont to require assist with bowel regimen, refuses to  participate with PT, OT, is beligerent at times with staff, although he has been more pleasant tonite.

## 2024-02-25 PROCEDURE — 82948 REAGENT STRIP/BLOOD GLUCOSE: CPT

## 2024-02-25 PROCEDURE — 63710000001 INSULIN DETEMIR PER 5 UNITS: Performed by: PHYSICIAN ASSISTANT

## 2024-02-25 PROCEDURE — 63710000001 INSULIN LISPRO (HUMAN) PER 5 UNITS: Performed by: INTERNAL MEDICINE

## 2024-02-25 RX ADMIN — SERTRALINE HYDROCHLORIDE 50 MG: 50 TABLET ORAL at 20:27

## 2024-02-25 RX ADMIN — BACITRACIN 0.9 G: 500 OINTMENT TOPICAL at 09:22

## 2024-02-25 RX ADMIN — HYDROCORTISONE: 25 CREAM TOPICAL at 09:23

## 2024-02-25 RX ADMIN — INSULIN DETEMIR 40 UNITS: 100 INJECTION, SOLUTION SUBCUTANEOUS at 20:26

## 2024-02-25 RX ADMIN — OXYCODONE AND ACETAMINOPHEN 1 TABLET: 7.5; 325 TABLET ORAL at 09:22

## 2024-02-25 RX ADMIN — INSULIN LISPRO 8 UNITS: 100 INJECTION, SOLUTION INTRAVENOUS; SUBCUTANEOUS at 12:00

## 2024-02-25 RX ADMIN — ATORVASTATIN CALCIUM 10 MG: 10 TABLET, FILM COATED ORAL at 20:28

## 2024-02-25 RX ADMIN — BACLOFEN 10 MG: 10 TABLET ORAL at 01:06

## 2024-02-25 RX ADMIN — IBUPROFEN 400 MG: 400 TABLET ORAL at 05:21

## 2024-02-25 RX ADMIN — OXYCODONE AND ACETAMINOPHEN 1 TABLET: 7.5; 325 TABLET ORAL at 20:28

## 2024-02-25 RX ADMIN — CYANOCOBALAMIN TAB 500 MCG 1000 MCG: 500 TAB at 09:22

## 2024-02-25 RX ADMIN — Medication 500 MG: at 09:22

## 2024-02-25 RX ADMIN — BACLOFEN 10 MG: 10 TABLET ORAL at 09:22

## 2024-02-25 RX ADMIN — LISINOPRIL 2.5 MG: 2.5 TABLET ORAL at 09:22

## 2024-02-25 RX ADMIN — Medication 500 MG: at 20:28

## 2024-02-25 RX ADMIN — EMPAGLIFLOZIN 10 MG: 10 TABLET, FILM COATED ORAL at 09:22

## 2024-02-25 RX ADMIN — BACLOFEN 10 MG: 10 TABLET ORAL at 19:40

## 2024-02-25 RX ADMIN — PREGABALIN 100 MG: 100 CAPSULE ORAL at 20:32

## 2024-02-25 RX ADMIN — FERROUS SULFATE TAB 325 MG (65 MG ELEMENTAL FE) 325 MG: 325 (65 FE) TAB at 09:22

## 2024-02-25 RX ADMIN — APIXABAN 5 MG: 5 TABLET, FILM COATED ORAL at 09:22

## 2024-02-25 RX ADMIN — PREGABALIN 100 MG: 100 CAPSULE ORAL at 09:22

## 2024-02-25 RX ADMIN — INSULIN DETEMIR 40 UNITS: 100 INJECTION, SOLUTION SUBCUTANEOUS at 08:12

## 2024-02-25 RX ADMIN — PREGABALIN 100 MG: 100 CAPSULE ORAL at 17:13

## 2024-02-25 RX ADMIN — OXYCODONE AND ACETAMINOPHEN 1 TABLET: 7.5; 325 TABLET ORAL at 01:06

## 2024-02-25 RX ADMIN — Medication 10 MG: at 20:27

## 2024-02-25 RX ADMIN — APIXABAN 5 MG: 5 TABLET, FILM COATED ORAL at 20:28

## 2024-02-25 NOTE — PLAN OF CARE
Goal Outcome Evaluation:  Plan of Care Reviewed With: patient        Progress: no change  Outcome Evaluation: Patient alert and oriented, vital signs stable. Blood glucose within normal limits. C/o leg/hip pain this morning, PRN pain medications given as ordered. Continue plan of care.

## 2024-02-25 NOTE — PLAN OF CARE
Goal Outcome Evaluation:VSS, continues to refuse turns allows weight shifting at times. Blood sugar remains controlled. Pain meds given baclofen and percocet at 0106.   Per pt. Pain is always 10/10 even after pain meds.

## 2024-02-26 LAB
GLUCOSE BLDC GLUCOMTR-MCNC: 105 MG/DL (ref 70–99)
GLUCOSE BLDC GLUCOMTR-MCNC: 106 MG/DL (ref 70–99)
GLUCOSE BLDC GLUCOMTR-MCNC: 130 MG/DL (ref 70–99)
GLUCOSE BLDC GLUCOMTR-MCNC: 132 MG/DL (ref 70–99)
GLUCOSE BLDC GLUCOMTR-MCNC: 133 MG/DL (ref 70–99)
GLUCOSE BLDC GLUCOMTR-MCNC: 137 MG/DL (ref 70–99)
GLUCOSE BLDC GLUCOMTR-MCNC: 142 MG/DL (ref 70–99)
GLUCOSE BLDC GLUCOMTR-MCNC: 144 MG/DL (ref 70–99)
GLUCOSE BLDC GLUCOMTR-MCNC: 145 MG/DL (ref 70–99)
GLUCOSE BLDC GLUCOMTR-MCNC: 244 MG/DL (ref 70–99)
GLUCOSE BLDC GLUCOMTR-MCNC: 79 MG/DL (ref 70–99)
GLUCOSE BLDC GLUCOMTR-MCNC: 91 MG/DL (ref 70–99)
SARS-COV-2 RNA RESP QL NAA+PROBE: NOT DETECTED

## 2024-02-26 PROCEDURE — 82948 REAGENT STRIP/BLOOD GLUCOSE: CPT | Performed by: INTERNAL MEDICINE

## 2024-02-26 PROCEDURE — 82948 REAGENT STRIP/BLOOD GLUCOSE: CPT

## 2024-02-26 PROCEDURE — 63710000001 INSULIN DETEMIR PER 5 UNITS: Performed by: PHYSICIAN ASSISTANT

## 2024-02-26 PROCEDURE — 87635 SARS-COV-2 COVID-19 AMP PRB: CPT | Performed by: PHYSICIAN ASSISTANT

## 2024-02-26 RX ADMIN — INSULIN DETEMIR 40 UNITS: 100 INJECTION, SOLUTION SUBCUTANEOUS at 21:06

## 2024-02-26 RX ADMIN — LISINOPRIL 2.5 MG: 2.5 TABLET ORAL at 09:13

## 2024-02-26 RX ADMIN — PREGABALIN 100 MG: 100 CAPSULE ORAL at 09:13

## 2024-02-26 RX ADMIN — PREGABALIN 100 MG: 100 CAPSULE ORAL at 16:23

## 2024-02-26 RX ADMIN — IBUPROFEN 400 MG: 400 TABLET ORAL at 09:19

## 2024-02-26 RX ADMIN — Medication 1 APPLICATION: at 13:57

## 2024-02-26 RX ADMIN — BACITRACIN 0.9 G: 500 OINTMENT TOPICAL at 09:12

## 2024-02-26 RX ADMIN — APIXABAN 5 MG: 5 TABLET, FILM COATED ORAL at 09:13

## 2024-02-26 RX ADMIN — CYANOCOBALAMIN TAB 500 MCG 1000 MCG: 500 TAB at 09:13

## 2024-02-26 RX ADMIN — BACLOFEN 10 MG: 10 TABLET ORAL at 13:51

## 2024-02-26 RX ADMIN — PREGABALIN 100 MG: 100 CAPSULE ORAL at 21:03

## 2024-02-26 RX ADMIN — EMPAGLIFLOZIN 10 MG: 10 TABLET, FILM COATED ORAL at 09:13

## 2024-02-26 RX ADMIN — ATORVASTATIN CALCIUM 10 MG: 10 TABLET, FILM COATED ORAL at 21:04

## 2024-02-26 RX ADMIN — Medication 500 MG: at 09:13

## 2024-02-26 RX ADMIN — Medication 500 MG: at 21:04

## 2024-02-26 RX ADMIN — OXYCODONE AND ACETAMINOPHEN 1 TABLET: 7.5; 325 TABLET ORAL at 02:57

## 2024-02-26 RX ADMIN — OXYCODONE AND ACETAMINOPHEN 1 TABLET: 7.5; 325 TABLET ORAL at 09:58

## 2024-02-26 RX ADMIN — INSULIN DETEMIR 40 UNITS: 100 INJECTION, SOLUTION SUBCUTANEOUS at 09:12

## 2024-02-26 RX ADMIN — APIXABAN 5 MG: 5 TABLET, FILM COATED ORAL at 21:04

## 2024-02-26 RX ADMIN — FERROUS SULFATE TAB 325 MG (65 MG ELEMENTAL FE) 325 MG: 325 (65 FE) TAB at 09:14

## 2024-02-26 RX ADMIN — Medication 10 MG: at 21:04

## 2024-02-26 RX ADMIN — SERTRALINE HYDROCHLORIDE 50 MG: 50 TABLET ORAL at 21:03

## 2024-02-26 RX ADMIN — BACLOFEN 10 MG: 10 TABLET ORAL at 04:34

## 2024-02-26 NOTE — PLAN OF CARE
Goal Outcome Evaluation:  Plan of Care Reviewed With: patient        Progress: no change  Outcome Evaluation: Vital signs stable. Disoriented to time to during night. Percocet and baclofen administered for pain management of left and hip and shoulder. No significant change.

## 2024-02-26 NOTE — PLAN OF CARE
"Goal Outcome Evaluation:  Plan of Care Reviewed With: patient        Progress: no change  Outcome Evaluation: Patient is alert and oriented. Stated pain is worse today. Given PRN Ibuprofen at 0919 for left shoulder and arm pain. Arm supported on multiple pillows. Given PRN Percocet at 0958 for c/o left hip pain 10/10. Med somewhat effective but stated today it \"just took the edge off\", Given PRN Baclofen at 1351 for spasms and at 1357 PRN Ammonium Lactate applied to right lower leg. Patient refused turns multiple times and refused to sit on side of bed for staff stating he was in too much pain and he did not sleep well last night. Voices needs. Con't current POC.                               "

## 2024-02-27 LAB
GLUCOSE BLDC GLUCOMTR-MCNC: 114 MG/DL (ref 70–99)
GLUCOSE BLDC GLUCOMTR-MCNC: 122 MG/DL (ref 70–99)
GLUCOSE BLDC GLUCOMTR-MCNC: 136 MG/DL (ref 70–99)
GLUCOSE BLDC GLUCOMTR-MCNC: 281 MG/DL (ref 70–99)

## 2024-02-27 PROCEDURE — 82948 REAGENT STRIP/BLOOD GLUCOSE: CPT | Performed by: INTERNAL MEDICINE

## 2024-02-27 PROCEDURE — 63710000001 INSULIN DETEMIR PER 5 UNITS: Performed by: PHYSICIAN ASSISTANT

## 2024-02-27 PROCEDURE — 82948 REAGENT STRIP/BLOOD GLUCOSE: CPT

## 2024-02-27 PROCEDURE — 99309 SBSQ NF CARE MODERATE MDM 30: CPT | Performed by: PHYSICIAN ASSISTANT

## 2024-02-27 PROCEDURE — 63710000001 INSULIN LISPRO (HUMAN) PER 5 UNITS: Performed by: INTERNAL MEDICINE

## 2024-02-27 RX ADMIN — Medication 10 MG: at 20:16

## 2024-02-27 RX ADMIN — PREGABALIN 100 MG: 100 CAPSULE ORAL at 16:53

## 2024-02-27 RX ADMIN — Medication 500 MG: at 20:16

## 2024-02-27 RX ADMIN — BACLOFEN 10 MG: 10 TABLET ORAL at 00:14

## 2024-02-27 RX ADMIN — APIXABAN 5 MG: 5 TABLET, FILM COATED ORAL at 20:16

## 2024-02-27 RX ADMIN — OXYCODONE AND ACETAMINOPHEN 1 TABLET: 7.5; 325 TABLET ORAL at 01:15

## 2024-02-27 RX ADMIN — CYANOCOBALAMIN TAB 500 MCG 1000 MCG: 500 TAB at 08:34

## 2024-02-27 RX ADMIN — SERTRALINE HYDROCHLORIDE 50 MG: 50 TABLET ORAL at 20:16

## 2024-02-27 RX ADMIN — Medication 500 MG: at 08:34

## 2024-02-27 RX ADMIN — BACLOFEN 10 MG: 10 TABLET ORAL at 08:34

## 2024-02-27 RX ADMIN — IBUPROFEN 400 MG: 400 TABLET ORAL at 03:35

## 2024-02-27 RX ADMIN — BACITRACIN 0.9 G: 500 OINTMENT TOPICAL at 08:33

## 2024-02-27 RX ADMIN — INSULIN DETEMIR 40 UNITS: 100 INJECTION, SOLUTION SUBCUTANEOUS at 20:15

## 2024-02-27 RX ADMIN — EMPAGLIFLOZIN 10 MG: 10 TABLET, FILM COATED ORAL at 08:34

## 2024-02-27 RX ADMIN — LISINOPRIL 2.5 MG: 2.5 TABLET ORAL at 08:34

## 2024-02-27 RX ADMIN — OXYCODONE AND ACETAMINOPHEN 1 TABLET: 7.5; 325 TABLET ORAL at 13:18

## 2024-02-27 RX ADMIN — BACLOFEN 10 MG: 10 TABLET ORAL at 18:38

## 2024-02-27 RX ADMIN — PREGABALIN 100 MG: 100 CAPSULE ORAL at 20:16

## 2024-02-27 RX ADMIN — APIXABAN 5 MG: 5 TABLET, FILM COATED ORAL at 08:34

## 2024-02-27 RX ADMIN — ATORVASTATIN CALCIUM 10 MG: 10 TABLET, FILM COATED ORAL at 20:16

## 2024-02-27 RX ADMIN — INSULIN DETEMIR 40 UNITS: 100 INJECTION, SOLUTION SUBCUTANEOUS at 08:33

## 2024-02-27 RX ADMIN — OXYCODONE AND ACETAMINOPHEN 1 TABLET: 7.5; 325 TABLET ORAL at 20:19

## 2024-02-27 RX ADMIN — INSULIN LISPRO 12 UNITS: 100 INJECTION, SOLUTION INTRAVENOUS; SUBCUTANEOUS at 07:59

## 2024-02-27 RX ADMIN — FERROUS SULFATE TAB 325 MG (65 MG ELEMENTAL FE) 325 MG: 325 (65 FE) TAB at 07:59

## 2024-02-27 RX ADMIN — PREGABALIN 100 MG: 100 CAPSULE ORAL at 08:34

## 2024-02-27 NOTE — PROGRESS NOTES
[facility]   Hospitalist Progress Note  Date: 2024  Patient Name: Jose Shaikh  : 1960  MRN: [id number]  Date of admission: 2023      Subjective   Subjective     Chief Complaint: Weakness    Summary: Jose Shaikh is a 63 y.o. male  initially hospitalized on 9/10/2023, prolonged hospitalization for treatment of management of generalized weakness, deconditioning, with acute issues of diarrhea, C. difficile negative.  Diarrhea improved.  Had small hematoma after ambulating on his foot.  Unfortunately with exhaustive efforts to try to place this gentleman after 39 days of hospitalization, we are unable to find a facility that will accept him.  He has no further acute needs or requirements for inpatient monitoring and management, his labs and vitals are stable, he is tolerating oral intake, and has been refusing turns and repositionings and other interventions with nursing staff despite recommendations.  Patient discharged in hemodynamically stable condition on 10/19/2023 to follow-up with PCP within 1 week. Unfortunately we cannot solve all of his social issues during this hospitalization due to lack of resources and participation from the patient's perspective despite all our efforts.  Patient has a financial means of making arrangements at home.  Patient appealed his discharge.  Lost his appeal.  LCD: 10/20/23, PFL beginning: 10/21/23 @ 12pm.  Due to an inability to place the patient in a.m. nursing home he has been placed in our skilled nursing facility until arrangements can be made or until he is able to care for himself.      Interval Followup: 2024    No new complaints.  He is tolerating Ozempic without any nausea or vomiting.  He would like to continue, hoping to lose more weight until he can get orthopedic referral for possible hip replacement.  Per patient, left hip pain is chiefly what is limiting his mobility.  He feels that with weight loss, Ortho referral, he can proceed with  hip replacement and afterwards will be able to mobilize more independently.  No fevers.  Vital stable  Glucose controlled      Review of systems: All systems reviewed and negative except what is noted above in interval follow-up   Objective   Objective     Vitals:   Temp:  [97.7 °F (36.5 °C)-98.1 °F (36.7 °C)] 98.1 °F (36.7 °C)  Heart Rate:  [74-75] 75  Resp:  [16-18] 16  BP: (101-109)/(58-60) 109/60    Physical Exam   Constitutional: Awake alert oriented no acute distress  Respiratory: No wheeze  GI: Abdomen: Obese  Neuro/psych:  Calm mood.  No dysarthria.  Extremities: Some lower extremity edema noted    Result Review    Result Review:  I have personally reviewed the results from the time of this admission to 2/27/2024 10:35 EST and agree with these findings:  [x]  Laboratory  [x]  Microbiology  []  Radiology  []  EKG/Telemetry   []  Cardiology/Vascular   []  Pathology  []  Old records  []  Other:    Assessment & Plan   Assessment / Plan   Assessment:  Hospital-acquired weakness  Influenza A  C. difficile diarrhea treated  Generalized weakness  Deconditioning  DM (Kami Garcia and PCP)  HTN  PAF (on Eliquis; previously seen Dr. 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  Increase to 0.5 mg weekly, starting 2/28.  Chronic venous stasis dermatitis/Apply Lac-Hydrin.  Bacitracin to small wound left medial calf.  Wound care RN following.  As needed Lasix  Continue basal insulin and SSI per protocol titrate as needed  Continue Eliquis for stroke prophylaxis  Continue probiotics  Discussed with RN    DVT prophylaxis:  Medical DVT prophylaxis orders are  present.    CODE STATUS:   Code Status (Patient has no pulse and is not breathing): CPR (Attempt to Resuscitate)  Medical Interventions (Patient has pulse or is breathing): Full Support

## 2024-02-27 NOTE — PLAN OF CARE
Goal Outcome Evaluation:Patient AOX4, cont to complain of pain, meds given, pt states pain meds help but pain never goes away completely

## 2024-02-27 NOTE — PLAN OF CARE
Goal Outcome Evaluation:   Alert and oriented and pleasant with staff. X2 assist for transfers and ambulation. X2 admin PRN pain medication, with relief of symptoms noted. New order for Dosage increase to Home Med received this shift. Sitting up in bed, call light in reach.

## 2024-02-28 LAB
ANION GAP SERPL CALCULATED.3IONS-SCNC: 8.5 MMOL/L (ref 5–15)
BUN SERPL-MCNC: 17 MG/DL (ref 8–23)
BUN/CREAT SERPL: 35.4 (ref 7–25)
CALCIUM SPEC-SCNC: 8.4 MG/DL (ref 8.6–10.5)
CHLORIDE SERPL-SCNC: 107 MMOL/L (ref 98–107)
CO2 SERPL-SCNC: 22.5 MMOL/L (ref 22–29)
CREAT SERPL-MCNC: 0.48 MG/DL (ref 0.76–1.27)
EGFRCR SERPLBLD CKD-EPI 2021: 114.6 ML/MIN/1.73
GLUCOSE BLDC GLUCOMTR-MCNC: 108 MG/DL (ref 70–99)
GLUCOSE BLDC GLUCOMTR-MCNC: 129 MG/DL (ref 70–99)
GLUCOSE BLDC GLUCOMTR-MCNC: 143 MG/DL (ref 70–99)
GLUCOSE BLDC GLUCOMTR-MCNC: 83 MG/DL (ref 70–99)
GLUCOSE SERPL-MCNC: 104 MG/DL (ref 65–99)
POTASSIUM SERPL-SCNC: 4 MMOL/L (ref 3.5–5.2)
SODIUM SERPL-SCNC: 138 MMOL/L (ref 136–145)
WHOLE BLOOD HOLD SPECIMEN: NORMAL

## 2024-02-28 PROCEDURE — 82948 REAGENT STRIP/BLOOD GLUCOSE: CPT

## 2024-02-28 PROCEDURE — 80048 BASIC METABOLIC PNL TOTAL CA: CPT | Performed by: PHYSICIAN ASSISTANT

## 2024-02-28 PROCEDURE — 63710000001 INSULIN DETEMIR PER 5 UNITS: Performed by: PHYSICIAN ASSISTANT

## 2024-02-28 PROCEDURE — 82948 REAGENT STRIP/BLOOD GLUCOSE: CPT | Performed by: INTERNAL MEDICINE

## 2024-02-28 RX ADMIN — OXYCODONE AND ACETAMINOPHEN 1 TABLET: 7.5; 325 TABLET ORAL at 08:05

## 2024-02-28 RX ADMIN — PREGABALIN 100 MG: 100 CAPSULE ORAL at 09:15

## 2024-02-28 RX ADMIN — SERTRALINE HYDROCHLORIDE 50 MG: 50 TABLET ORAL at 20:19

## 2024-02-28 RX ADMIN — ATORVASTATIN CALCIUM 10 MG: 10 TABLET, FILM COATED ORAL at 20:19

## 2024-02-28 RX ADMIN — INSULIN DETEMIR 40 UNITS: 100 INJECTION, SOLUTION SUBCUTANEOUS at 20:19

## 2024-02-28 RX ADMIN — PREGABALIN 100 MG: 100 CAPSULE ORAL at 20:19

## 2024-02-28 RX ADMIN — CYANOCOBALAMIN TAB 500 MCG 1000 MCG: 500 TAB at 09:15

## 2024-02-28 RX ADMIN — PREGABALIN 100 MG: 100 CAPSULE ORAL at 16:00

## 2024-02-28 RX ADMIN — INSULIN DETEMIR 40 UNITS: 100 INJECTION, SOLUTION SUBCUTANEOUS at 09:16

## 2024-02-28 RX ADMIN — APIXABAN 5 MG: 5 TABLET, FILM COATED ORAL at 09:15

## 2024-02-28 RX ADMIN — Medication 500 MG: at 09:15

## 2024-02-28 RX ADMIN — EMPAGLIFLOZIN 10 MG: 10 TABLET, FILM COATED ORAL at 09:15

## 2024-02-28 RX ADMIN — IBUPROFEN 400 MG: 400 TABLET ORAL at 03:06

## 2024-02-28 RX ADMIN — APIXABAN 5 MG: 5 TABLET, FILM COATED ORAL at 20:19

## 2024-02-28 RX ADMIN — FERROUS SULFATE TAB 325 MG (65 MG ELEMENTAL FE) 325 MG: 325 (65 FE) TAB at 09:15

## 2024-02-28 RX ADMIN — BACITRACIN 0.9 G: 500 OINTMENT TOPICAL at 09:17

## 2024-02-28 RX ADMIN — BACLOFEN 10 MG: 10 TABLET ORAL at 03:05

## 2024-02-28 RX ADMIN — Medication 10 MG: at 20:19

## 2024-02-28 RX ADMIN — OXYCODONE AND ACETAMINOPHEN 1 TABLET: 7.5; 325 TABLET ORAL at 20:19

## 2024-02-28 RX ADMIN — BACLOFEN 10 MG: 10 TABLET ORAL at 18:20

## 2024-02-28 RX ADMIN — Medication 500 MG: at 20:19

## 2024-02-28 RX ADMIN — LISINOPRIL 2.5 MG: 2.5 TABLET ORAL at 09:14

## 2024-02-28 NOTE — PLAN OF CARE
Goal Outcome Evaluation:           Progress: no change  Outcome Evaluation: Patient is alert and oriented x4, able to make needs known to staff.  Has been medicated x1 this shift with prn oxycodone, baclofen and motrin, see emar.  Rsd. states medication effective.  Refuses to use bedpan this shift and has been incontinent of bowels.  Urinal remains at bedside.  Activity encouraged this shift, Rsd. has attempted to pull self up in bed with overhead trapeze bar.  Has not ambulated or gotten out of bed this shift, Refuses to be turned, but does shift weight frequently while in bed.  Refuses bed alert.  Bowels formed this shift.  Call light and personal items within reach, Rsd. reminded to use call light for assistance, verbalizes understanding.  WIll continue to monitor and notify on-coming staff.  Current plan of care remains in place at this time.

## 2024-02-29 LAB
GLUCOSE BLDC GLUCOMTR-MCNC: 105 MG/DL (ref 70–99)
GLUCOSE BLDC GLUCOMTR-MCNC: 129 MG/DL (ref 70–99)
GLUCOSE BLDC GLUCOMTR-MCNC: 133 MG/DL (ref 70–99)
SARS-COV-2 RNA RESP QL NAA+PROBE: NOT DETECTED

## 2024-02-29 PROCEDURE — 82948 REAGENT STRIP/BLOOD GLUCOSE: CPT | Performed by: INTERNAL MEDICINE

## 2024-02-29 PROCEDURE — 63710000001 INSULIN DETEMIR PER 5 UNITS: Performed by: PHYSICIAN ASSISTANT

## 2024-02-29 PROCEDURE — 82948 REAGENT STRIP/BLOOD GLUCOSE: CPT

## 2024-02-29 PROCEDURE — 87635 SARS-COV-2 COVID-19 AMP PRB: CPT | Performed by: PHYSICIAN ASSISTANT

## 2024-02-29 RX ADMIN — OXYCODONE AND ACETAMINOPHEN 1 TABLET: 7.5; 325 TABLET ORAL at 07:52

## 2024-02-29 RX ADMIN — BACLOFEN 10 MG: 10 TABLET ORAL at 01:46

## 2024-02-29 RX ADMIN — FERROUS SULFATE TAB 325 MG (65 MG ELEMENTAL FE) 325 MG: 325 (65 FE) TAB at 07:45

## 2024-02-29 RX ADMIN — Medication 10 MG: at 22:31

## 2024-02-29 RX ADMIN — SERTRALINE HYDROCHLORIDE 50 MG: 50 TABLET ORAL at 22:33

## 2024-02-29 RX ADMIN — INSULIN DETEMIR 40 UNITS: 100 INJECTION, SOLUTION SUBCUTANEOUS at 09:18

## 2024-02-29 RX ADMIN — EMPAGLIFLOZIN 10 MG: 10 TABLET, FILM COATED ORAL at 09:20

## 2024-02-29 RX ADMIN — APIXABAN 5 MG: 5 TABLET, FILM COATED ORAL at 09:20

## 2024-02-29 RX ADMIN — Medication 1 APPLICATION: at 09:27

## 2024-02-29 RX ADMIN — ACETAMINOPHEN 650 MG: 325 TABLET ORAL at 16:16

## 2024-02-29 RX ADMIN — ACETAMINOPHEN 650 MG: 325 TABLET ORAL at 06:03

## 2024-02-29 RX ADMIN — Medication 500 MG: at 22:33

## 2024-02-29 RX ADMIN — Medication 500 MG: at 09:21

## 2024-02-29 RX ADMIN — INSULIN DETEMIR 40 UNITS: 100 INJECTION, SOLUTION SUBCUTANEOUS at 22:31

## 2024-02-29 RX ADMIN — CYANOCOBALAMIN TAB 500 MCG 1000 MCG: 500 TAB at 09:20

## 2024-02-29 RX ADMIN — PREGABALIN 100 MG: 100 CAPSULE ORAL at 22:32

## 2024-02-29 RX ADMIN — BACLOFEN 10 MG: 10 TABLET ORAL at 09:18

## 2024-02-29 RX ADMIN — OXYCODONE AND ACETAMINOPHEN 1 TABLET: 7.5; 325 TABLET ORAL at 17:13

## 2024-02-29 RX ADMIN — PREGABALIN 100 MG: 100 CAPSULE ORAL at 09:34

## 2024-02-29 RX ADMIN — LISINOPRIL 2.5 MG: 2.5 TABLET ORAL at 09:21

## 2024-02-29 RX ADMIN — ATORVASTATIN CALCIUM 10 MG: 10 TABLET, FILM COATED ORAL at 22:33

## 2024-02-29 RX ADMIN — APIXABAN 5 MG: 5 TABLET, FILM COATED ORAL at 22:32

## 2024-02-29 RX ADMIN — PREGABALIN 100 MG: 100 CAPSULE ORAL at 16:16

## 2024-02-29 RX ADMIN — BACITRACIN 0.9 G: 500 OINTMENT TOPICAL at 09:20

## 2024-02-29 NOTE — PLAN OF CARE
Goal Outcome Evaluation:           Progress: no change  Outcome Evaluation: Patient is alert and oriented x4, able to make needs known to staff.  Has been medicated x1 this shift with prn oxycodone and baclofen, and tylenol see emar.  Rsd. states medication effective.  Refuses to use bedpan this shift and has been incontinent of bowels.  Urinal remains at bedside.  Activity encouraged this shift, Rsd. has attempted to pull self up in bed with overhead trapeze bar.  Has not ambulated or gotten out of bed this shift, Refuses to be turned, but does shift weight frequently while in bed.  Refuses bed alert.  Bowels formed this shift.  Call light and personal items within reach, Rsd. reminded to use call light for assistance, verbalizes understanding.  WIll continue to monitor and notify on-coming staff.  Current plan of care remains in place at this time.

## 2024-02-29 NOTE — PLAN OF CARE
Goal Outcome Evaluation:  Plan of Care Reviewed With: patient        Progress: no change  Outcome Evaluation: Patient is alert and oriented x4. Given PRN Percocet at 0752 and 1713 for c/o severe left hip pain. Given PRN Baclofen at 0918 for left hip spasms. Given PRN Tylenol at 1616. All meds effective per patient. Con't current POC.

## 2024-03-01 LAB
GLUCOSE BLDC GLUCOMTR-MCNC: 106 MG/DL (ref 70–99)
GLUCOSE BLDC GLUCOMTR-MCNC: 120 MG/DL (ref 70–99)
GLUCOSE BLDC GLUCOMTR-MCNC: 124 MG/DL (ref 70–99)
GLUCOSE BLDC GLUCOMTR-MCNC: 129 MG/DL (ref 70–99)
GLUCOSE BLDC GLUCOMTR-MCNC: 93 MG/DL (ref 70–99)

## 2024-03-01 PROCEDURE — 63710000001 INSULIN DETEMIR PER 5 UNITS: Performed by: PHYSICIAN ASSISTANT

## 2024-03-01 PROCEDURE — 82948 REAGENT STRIP/BLOOD GLUCOSE: CPT | Performed by: INTERNAL MEDICINE

## 2024-03-01 PROCEDURE — 82948 REAGENT STRIP/BLOOD GLUCOSE: CPT

## 2024-03-01 PROCEDURE — 63710000001 INSULIN LISPRO (HUMAN) PER 5 UNITS: Performed by: INTERNAL MEDICINE

## 2024-03-01 RX ADMIN — PREGABALIN 100 MG: 100 CAPSULE ORAL at 08:29

## 2024-03-01 RX ADMIN — FERROUS SULFATE TAB 325 MG (65 MG ELEMENTAL FE) 325 MG: 325 (65 FE) TAB at 07:45

## 2024-03-01 RX ADMIN — ATORVASTATIN CALCIUM 10 MG: 10 TABLET, FILM COATED ORAL at 20:18

## 2024-03-01 RX ADMIN — APIXABAN 5 MG: 5 TABLET, FILM COATED ORAL at 08:29

## 2024-03-01 RX ADMIN — BACLOFEN 10 MG: 10 TABLET ORAL at 02:41

## 2024-03-01 RX ADMIN — CYANOCOBALAMIN TAB 500 MCG 1000 MCG: 500 TAB at 08:29

## 2024-03-01 RX ADMIN — Medication 500 MG: at 20:18

## 2024-03-01 RX ADMIN — OXYCODONE AND ACETAMINOPHEN 1 TABLET: 7.5; 325 TABLET ORAL at 23:42

## 2024-03-01 RX ADMIN — OXYCODONE AND ACETAMINOPHEN 1 TABLET: 7.5; 325 TABLET ORAL at 07:45

## 2024-03-01 RX ADMIN — INSULIN DETEMIR 40 UNITS: 100 INJECTION, SOLUTION SUBCUTANEOUS at 20:17

## 2024-03-01 RX ADMIN — BACLOFEN 10 MG: 10 TABLET ORAL at 20:17

## 2024-03-01 RX ADMIN — INSULIN LISPRO 4 UNITS: 100 INJECTION, SOLUTION INTRAVENOUS; SUBCUTANEOUS at 20:17

## 2024-03-01 RX ADMIN — INSULIN DETEMIR 40 UNITS: 100 INJECTION, SOLUTION SUBCUTANEOUS at 08:29

## 2024-03-01 RX ADMIN — EMPAGLIFLOZIN 10 MG: 10 TABLET, FILM COATED ORAL at 08:29

## 2024-03-01 RX ADMIN — ACETAMINOPHEN 650 MG: 325 TABLET ORAL at 05:18

## 2024-03-01 RX ADMIN — BACITRACIN 0.9 G: 500 OINTMENT TOPICAL at 08:30

## 2024-03-01 RX ADMIN — Medication 10 MG: at 20:17

## 2024-03-01 RX ADMIN — PREGABALIN 100 MG: 100 CAPSULE ORAL at 20:18

## 2024-03-01 RX ADMIN — LISINOPRIL 2.5 MG: 2.5 TABLET ORAL at 08:29

## 2024-03-01 RX ADMIN — Medication 500 MG: at 08:29

## 2024-03-01 RX ADMIN — IBUPROFEN 400 MG: 400 TABLET ORAL at 01:32

## 2024-03-01 RX ADMIN — IBUPROFEN 400 MG: 400 TABLET ORAL at 15:23

## 2024-03-01 RX ADMIN — APIXABAN 5 MG: 5 TABLET, FILM COATED ORAL at 20:18

## 2024-03-01 RX ADMIN — SERTRALINE HYDROCHLORIDE 50 MG: 50 TABLET ORAL at 20:18

## 2024-03-01 RX ADMIN — PREGABALIN 100 MG: 100 CAPSULE ORAL at 16:44

## 2024-03-01 NOTE — PLAN OF CARE
Goal Outcome Evaluation:   Alert and oriented and pleasant with staff. X2 Max assist for transfers and ambulation. X2 admin PRN pain medication, with relief of symptoms noted this shift. Continues with current weight loss plan, and has had some improvement. Sitting up in bed, call light in reach.

## 2024-03-02 PROCEDURE — 82948 REAGENT STRIP/BLOOD GLUCOSE: CPT

## 2024-03-02 PROCEDURE — 63710000001 INSULIN DETEMIR PER 5 UNITS: Performed by: PHYSICIAN ASSISTANT

## 2024-03-02 PROCEDURE — 63710000001 INSULIN LISPRO (HUMAN) PER 5 UNITS: Performed by: INTERNAL MEDICINE

## 2024-03-02 PROCEDURE — 99308 SBSQ NF CARE LOW MDM 20: CPT | Performed by: INTERNAL MEDICINE

## 2024-03-02 RX ADMIN — INSULIN DETEMIR 40 UNITS: 100 INJECTION, SOLUTION SUBCUTANEOUS at 20:04

## 2024-03-02 RX ADMIN — EMPAGLIFLOZIN 10 MG: 10 TABLET, FILM COATED ORAL at 09:24

## 2024-03-02 RX ADMIN — Medication 500 MG: at 09:23

## 2024-03-02 RX ADMIN — APIXABAN 5 MG: 5 TABLET, FILM COATED ORAL at 20:05

## 2024-03-02 RX ADMIN — Medication 10 MG: at 20:05

## 2024-03-02 RX ADMIN — ATORVASTATIN CALCIUM 10 MG: 10 TABLET, FILM COATED ORAL at 20:04

## 2024-03-02 RX ADMIN — CYANOCOBALAMIN TAB 500 MCG 1000 MCG: 500 TAB at 09:23

## 2024-03-02 RX ADMIN — OXYCODONE AND ACETAMINOPHEN 1 TABLET: 7.5; 325 TABLET ORAL at 16:08

## 2024-03-02 RX ADMIN — Medication 500 MG: at 20:04

## 2024-03-02 RX ADMIN — FERROUS SULFATE TAB 325 MG (65 MG ELEMENTAL FE) 325 MG: 325 (65 FE) TAB at 07:33

## 2024-03-02 RX ADMIN — PREGABALIN 100 MG: 100 CAPSULE ORAL at 09:23

## 2024-03-02 RX ADMIN — IBUPROFEN 400 MG: 400 TABLET ORAL at 18:15

## 2024-03-02 RX ADMIN — APIXABAN 5 MG: 5 TABLET, FILM COATED ORAL at 09:24

## 2024-03-02 RX ADMIN — PREGABALIN 100 MG: 100 CAPSULE ORAL at 16:05

## 2024-03-02 RX ADMIN — INSULIN DETEMIR 40 UNITS: 100 INJECTION, SOLUTION SUBCUTANEOUS at 09:23

## 2024-03-02 RX ADMIN — ACETAMINOPHEN 650 MG: 325 TABLET ORAL at 04:42

## 2024-03-02 RX ADMIN — LISINOPRIL 2.5 MG: 2.5 TABLET ORAL at 09:23

## 2024-03-02 RX ADMIN — BACLOFEN 10 MG: 10 TABLET ORAL at 12:02

## 2024-03-02 RX ADMIN — BACLOFEN 10 MG: 10 TABLET ORAL at 20:05

## 2024-03-02 RX ADMIN — BACITRACIN 0.9 G: 500 OINTMENT TOPICAL at 09:26

## 2024-03-02 RX ADMIN — PREGABALIN 100 MG: 100 CAPSULE ORAL at 20:05

## 2024-03-02 RX ADMIN — INSULIN LISPRO 8 UNITS: 100 INJECTION, SOLUTION INTRAVENOUS; SUBCUTANEOUS at 12:00

## 2024-03-02 RX ADMIN — OXYCODONE AND ACETAMINOPHEN 1 TABLET: 7.5; 325 TABLET ORAL at 07:33

## 2024-03-02 RX ADMIN — SERTRALINE HYDROCHLORIDE 50 MG: 50 TABLET ORAL at 20:05

## 2024-03-02 NOTE — PROGRESS NOTES
[facility]   Hospitalist Progress Note  Date: 3/2/2024  Patient Name: Jose Shaikh  : 1960  MRN: [id number]  Date of admission: 2023      Subjective   Subjective     Chief Complaint: Weakness    Summary: Jose Shaikh is a 63 y.o. male  initially hospitalized on 9/10/2023, prolonged hospitalization for treatment of management of generalized weakness, deconditioning, with acute issues of diarrhea, C. difficile negative.  Diarrhea improved.  Had small hematoma after ambulating on his foot.  Unfortunately with exhaustive efforts to try to place this gentleman after 39 days of hospitalization, we are unable to find a facility that will accept him.  He has no further acute needs or requirements for inpatient monitoring and management, his labs and vitals are stable, he is tolerating oral intake, and has been refusing turns and repositionings and other interventions with nursing staff despite recommendations.  Patient discharged in hemodynamically stable condition on 10/19/2023 to follow-up with PCP within 1 week. Unfortunately we cannot solve all of his social issues during this hospitalization due to lack of resources and participation from the patient's perspective despite all our efforts.  Patient has a financial means of making arrangements at home.  Patient appealed his discharge.  Lost his appeal.  LCD: 10/20/23, PFL beginning: 10/21/23 @ 12pm.  Due to an inability to place the patient in a.m. nursing home he has been placed in our skilled nursing facility until arrangements can be made or until he is able to care for himself.      Interval Followup: 3/2/2024    Denies new complaints. He is happy with Ozempic and denies any adverse effects, but says he doesn't even care if there were side effects as long as he is losing weight.      Review of systems: All systems reviewed and negative except what is noted above in interval follow-up   Objective   Objective     Vitals:   Temp:  [97.9 °F (36.6  °C)-98.1 °F (36.7 °C)] 98.1 °F (36.7 °C)  Heart Rate:  [71-72] 72  Resp:  [18] 18  BP: ()/(67-70) 91/70    Physical Exam   Constitutional: Awake alert oriented no acute distress  Respiratory: No wheeze  GI: Abdomen: Obese  Neuro/psych:  Calm mood.  No dysarthria.  Extremities: Some lower extremity edema noted    Result Review    Result Review:  I have personally reviewed the results from the time of this admission to 3/2/2024 11:14 EST and agree with these findings:  [x]  Laboratory  [x]  Microbiology  []  Radiology  []  EKG/Telemetry   []  Cardiology/Vascular   []  Pathology  []  Old records  []  Other:    Assessment & Plan   Assessment / Plan   Assessment:  Hospital-acquired weakness  Influenza A  C. difficile diarrhea treated  Generalized weakness  Deconditioning  DM (Kami Garcia and PCP)  HTN  PAF (on Eliquis; previously seen Dr. 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   Chronic venous stasis dermatitis/Apply Lac-Hydrin.  Bacitracin to small wound left medial calf.  Wound care RN following.  Continue basal insulin and SSI per protocol titrate as needed  Continue Eliquis for stroke prophylaxis  Continue probiotics    DVT prophylaxis:  Medical DVT prophylaxis orders are present.    CODE STATUS:   Code Status (Patient has no pulse and is not breathing): CPR (Attempt to Resuscitate)  Medical Interventions (Patient has pulse or is breathing): Full Support

## 2024-03-02 NOTE — PLAN OF CARE
Goal Outcome Evaluation:   Alert and oriented and pleasant with staff. X2 Max assist for transfers and ambulation. X3 admin PRN pain medication, with relief of symptoms noted. Noted improved disposition this shift. No other significant changes noted. Sitting up in bed, call light in reach.

## 2024-03-03 PROCEDURE — 63710000001 INSULIN LISPRO (HUMAN) PER 5 UNITS: Performed by: INTERNAL MEDICINE

## 2024-03-03 PROCEDURE — 63710000001 ONDANSETRON ODT 4 MG TABLET DISPERSIBLE: Performed by: INTERNAL MEDICINE

## 2024-03-03 PROCEDURE — 82948 REAGENT STRIP/BLOOD GLUCOSE: CPT

## 2024-03-03 PROCEDURE — 63710000001 INSULIN DETEMIR PER 5 UNITS: Performed by: PHYSICIAN ASSISTANT

## 2024-03-03 RX ADMIN — PREGABALIN 100 MG: 100 CAPSULE ORAL at 08:39

## 2024-03-03 RX ADMIN — LISINOPRIL 2.5 MG: 2.5 TABLET ORAL at 08:39

## 2024-03-03 RX ADMIN — ONDANSETRON 4 MG: 4 TABLET, ORALLY DISINTEGRATING ORAL at 13:03

## 2024-03-03 RX ADMIN — APIXABAN 5 MG: 5 TABLET, FILM COATED ORAL at 21:03

## 2024-03-03 RX ADMIN — INSULIN DETEMIR 40 UNITS: 100 INJECTION, SOLUTION SUBCUTANEOUS at 21:03

## 2024-03-03 RX ADMIN — SERTRALINE HYDROCHLORIDE 50 MG: 50 TABLET ORAL at 21:03

## 2024-03-03 RX ADMIN — Medication 500 MG: at 08:39

## 2024-03-03 RX ADMIN — IBUPROFEN 400 MG: 400 TABLET ORAL at 05:11

## 2024-03-03 RX ADMIN — INSULIN DETEMIR 40 UNITS: 100 INJECTION, SOLUTION SUBCUTANEOUS at 08:39

## 2024-03-03 RX ADMIN — APIXABAN 5 MG: 5 TABLET, FILM COATED ORAL at 08:39

## 2024-03-03 RX ADMIN — Medication 10 MG: at 21:03

## 2024-03-03 RX ADMIN — Medication 500 MG: at 21:03

## 2024-03-03 RX ADMIN — CYANOCOBALAMIN TAB 500 MCG 1000 MCG: 500 TAB at 08:39

## 2024-03-03 RX ADMIN — EMPAGLIFLOZIN 10 MG: 10 TABLET, FILM COATED ORAL at 08:39

## 2024-03-03 RX ADMIN — INSULIN LISPRO 4 UNITS: 100 INJECTION, SOLUTION INTRAVENOUS; SUBCUTANEOUS at 07:50

## 2024-03-03 RX ADMIN — INSULIN LISPRO 8 UNITS: 100 INJECTION, SOLUTION INTRAVENOUS; SUBCUTANEOUS at 21:02

## 2024-03-03 RX ADMIN — BACLOFEN 10 MG: 10 TABLET ORAL at 21:07

## 2024-03-03 RX ADMIN — PREGABALIN 100 MG: 100 CAPSULE ORAL at 16:34

## 2024-03-03 RX ADMIN — PREGABALIN 100 MG: 100 CAPSULE ORAL at 21:03

## 2024-03-03 RX ADMIN — OXYCODONE AND ACETAMINOPHEN 1 TABLET: 7.5; 325 TABLET ORAL at 01:15

## 2024-03-03 RX ADMIN — OXYCODONE AND ACETAMINOPHEN 1 TABLET: 7.5; 325 TABLET ORAL at 23:13

## 2024-03-03 RX ADMIN — ATORVASTATIN CALCIUM 10 MG: 10 TABLET, FILM COATED ORAL at 21:03

## 2024-03-03 RX ADMIN — FERROUS SULFATE TAB 325 MG (65 MG ELEMENTAL FE) 325 MG: 325 (65 FE) TAB at 07:50

## 2024-03-03 RX ADMIN — BACLOFEN 10 MG: 10 TABLET ORAL at 08:39

## 2024-03-03 NOTE — PLAN OF CARE
Goal Outcome Evaluation:  Plan of Care Reviewed With: patient        Progress: no change  Outcome Evaluation: Alert and oriented X 4 Vital signs stable Baclofen and percocet administered for pain management to left shoulder and and hip. Bedpan and urinal used for elmination. Able to repositione self with use of side rails and trapeze. Refuses turns by staff and floating of heals. Continue plan of care.

## 2024-03-03 NOTE — PLAN OF CARE
Goal Outcome Evaluation:   Alert and oriented and pleasant with staff. X2 Max assist for transfers and ambulation. X1 admin PRN pain medication this shift, with relief of symptoms noted. X1 admin antiemetic for c/o nausea, with relief of symptoms noted. No other significant changes noted. Sitting up in bed, call light in reach.

## 2024-03-04 LAB
GLUCOSE BLDC GLUCOMTR-MCNC: 106 MG/DL (ref 70–99)
GLUCOSE BLDC GLUCOMTR-MCNC: 111 MG/DL (ref 70–99)
GLUCOSE BLDC GLUCOMTR-MCNC: 111 MG/DL (ref 70–99)
GLUCOSE BLDC GLUCOMTR-MCNC: 118 MG/DL (ref 70–99)
GLUCOSE BLDC GLUCOMTR-MCNC: 124 MG/DL (ref 70–99)
GLUCOSE BLDC GLUCOMTR-MCNC: 125 MG/DL (ref 70–99)
GLUCOSE BLDC GLUCOMTR-MCNC: 129 MG/DL (ref 70–99)
GLUCOSE BLDC GLUCOMTR-MCNC: 132 MG/DL (ref 70–99)
GLUCOSE BLDC GLUCOMTR-MCNC: 142 MG/DL (ref 70–99)
GLUCOSE BLDC GLUCOMTR-MCNC: 152 MG/DL (ref 70–99)
GLUCOSE BLDC GLUCOMTR-MCNC: 163 MG/DL (ref 70–99)
GLUCOSE BLDC GLUCOMTR-MCNC: 208 MG/DL (ref 70–99)
GLUCOSE BLDC GLUCOMTR-MCNC: 235 MG/DL (ref 70–99)
SARS-COV-2 RNA RESP QL NAA+PROBE: NOT DETECTED

## 2024-03-04 PROCEDURE — 82948 REAGENT STRIP/BLOOD GLUCOSE: CPT

## 2024-03-04 PROCEDURE — 87635 SARS-COV-2 COVID-19 AMP PRB: CPT | Performed by: PHYSICIAN ASSISTANT

## 2024-03-04 PROCEDURE — 63710000001 INSULIN DETEMIR PER 5 UNITS: Performed by: PHYSICIAN ASSISTANT

## 2024-03-04 RX ADMIN — OXYCODONE AND ACETAMINOPHEN 1 TABLET: 7.5; 325 TABLET ORAL at 19:18

## 2024-03-04 RX ADMIN — EMPAGLIFLOZIN 10 MG: 10 TABLET, FILM COATED ORAL at 08:32

## 2024-03-04 RX ADMIN — PREGABALIN 100 MG: 100 CAPSULE ORAL at 17:09

## 2024-03-04 RX ADMIN — PREGABALIN 100 MG: 100 CAPSULE ORAL at 21:29

## 2024-03-04 RX ADMIN — BACLOFEN 10 MG: 10 TABLET ORAL at 16:15

## 2024-03-04 RX ADMIN — INSULIN DETEMIR 40 UNITS: 100 INJECTION, SOLUTION SUBCUTANEOUS at 21:29

## 2024-03-04 RX ADMIN — BACLOFEN 10 MG: 10 TABLET ORAL at 05:24

## 2024-03-04 RX ADMIN — LISINOPRIL 2.5 MG: 2.5 TABLET ORAL at 08:32

## 2024-03-04 RX ADMIN — Medication 10 MG: at 21:29

## 2024-03-04 RX ADMIN — INSULIN DETEMIR 40 UNITS: 100 INJECTION, SOLUTION SUBCUTANEOUS at 08:31

## 2024-03-04 RX ADMIN — CYANOCOBALAMIN TAB 500 MCG 1000 MCG: 500 TAB at 08:32

## 2024-03-04 RX ADMIN — APIXABAN 5 MG: 5 TABLET, FILM COATED ORAL at 21:29

## 2024-03-04 RX ADMIN — SIMETHICONE 80 MG: 80 TABLET, CHEWABLE ORAL at 14:55

## 2024-03-04 RX ADMIN — Medication 500 MG: at 21:29

## 2024-03-04 RX ADMIN — Medication 500 MG: at 08:32

## 2024-03-04 RX ADMIN — OXYCODONE AND ACETAMINOPHEN 1 TABLET: 7.5; 325 TABLET ORAL at 08:31

## 2024-03-04 RX ADMIN — FERROUS SULFATE TAB 325 MG (65 MG ELEMENTAL FE) 325 MG: 325 (65 FE) TAB at 08:32

## 2024-03-04 RX ADMIN — PREGABALIN 100 MG: 100 CAPSULE ORAL at 08:34

## 2024-03-04 RX ADMIN — ACETAMINOPHEN 650 MG: 325 TABLET ORAL at 06:02

## 2024-03-04 RX ADMIN — SERTRALINE HYDROCHLORIDE 50 MG: 50 TABLET ORAL at 21:29

## 2024-03-04 RX ADMIN — APIXABAN 5 MG: 5 TABLET, FILM COATED ORAL at 08:32

## 2024-03-04 RX ADMIN — ATORVASTATIN CALCIUM 10 MG: 10 TABLET, FILM COATED ORAL at 21:29

## 2024-03-04 NOTE — PLAN OF CARE
Goal Outcome Evaluation:  Plan of Care Reviewed With: patient        Progress: no change  Outcome Evaluation: AOx4, call light and personal items within reach. Able to make needs known. 1x to bedpan, urinal at bedside. Sleeping inbetween care. Skin care given. Medicated for muscle spams today, see emar. Con't plan of care.

## 2024-03-04 NOTE — NURSING NOTE
Patient is alert and oriented x4. C/O sudden onset of diaphoresis during 1000 rounding. POC blood glucose checked at that time and it was 142. Afebrile. Patient stated may be side effective of Ozempic. He is stable at this time. Oncoming nurse notified of complaint and blood glucose.

## 2024-03-04 NOTE — PLAN OF CARE
Goal Outcome Evaluation:  Plan of Care Reviewed With: patient        Progress: no change  Outcome Evaluation: Alert and oriented x4; able to make needs known to staff. Assists with turns to be cleaned up after incontinent episodes of bowels. Medicated with Baclofen x2, Percocet x1 for complaints of left hip and leg pain. States he has days and nights mixed up; awake all night watching tv and playing games on phone. Call light within reach; care plan ongoing.

## 2024-03-04 NOTE — CONSULTS
"Nutrition Services    Patient Name: Jose Shaikh  YOB: 1960  MRN: [id number]  Admission date: 11/6/2023      CLINICAL NUTRITION ASSESSMENT      Reason for Assessment  Follow Up     H&P:  Past Medical History:   Diagnosis Date    Absence of toe of right foot     Acute osteomyelitis of left calcaneus  08/18/2021    Anxiety and depression     Arthritis     Cancer     Chronic pain     STATES HIS PAIN IS 10/10 AAT    Claustrophobia     Corns and callus     Diabetic ulcer of left heel associated with type 2 DM 08/18/2021    Diabetic ulcer of left heel associated with type 2 DM 07/06/2021    Diabetic ulcer of right midfoot associated with type 2 DM 08/18/2021    Difficulty walking     Essential hypertension 08/31/2021    Hammertoe     Hyperlipidemia LDL goal <100 08/31/2021    Ingrown toenail     Obesity     Paroxysmal atrial fibrillation 08/31/2021    Polyneuropathy     Pressure ulcer, stage 1     Tinea unguium     Type 2 diabetes mellitus with polyneuropathy         Current Problems:   Active Hospital Problems    Diagnosis     **Debility     Ulcerated, foot, left, limited to breakdown of skin     Onychomycosis     Ulcer of right foot         Nutrition/Diet History         Narrative   Upon my visit pt reports a good appetite/intake. Per nursing documentation, pt has been consuming 100% of meals. Pt is on Ozempic and reports that he has been losing weight as desired. Last BM today.         Anthropometrics        Current Height, Weight Height: 188 cm (74.02\")  Weight: (!) 156 kg (344 lb 9.3 oz)   Current BMI Body mass index is 44.22 kg/m².   BMI Classification Obese Class III   % %   Adjusted Body Weight (ABW) 106 kg   Weight Hx  Wt Readings from Last 30 Encounters:   03/04/24 0500 (!) 156 kg (344 lb 9.3 oz)   03/03/24 0500 (!) 156 kg (344 lb 9.3 oz)   03/02/24 0530 (!) 157 kg (346 lb 12.5 oz)   03/01/24 0500 (!) 157 kg (345 lb 3.9 oz)   02/29/24 0500 (!) 157 kg (347 lb 3.6 oz)   02/28/24 0509 (!) " 158 kg (347 lb 14.2 oz)   02/27/24 0500 (!) 159 kg (351 lb 3.1 oz)   02/26/24 0500 (!) 159 kg (350 lb 12 oz)   02/25/24 0500 (!) 160 kg (351 lb 10.1 oz)   02/24/24 0500 (!) 160 kg (352 lb 1.2 oz)   02/23/24 0500 (!) 160 kg (353 lb 6.4 oz)   02/22/24 0500 (!) 160 kg (353 lb 13.4 oz)   02/21/24 0514 (!) 162 kg (356 lb 7.7 oz)   02/20/24 0500 (!) 161 kg (355 lb 2.6 oz)   02/19/24 0300 (!) 160 kg (353 lb 6.4 oz)   02/18/24 0500 (!) 162 kg (356 lb 7.7 oz)   02/17/24 0500 (!) 162 kg (357 lb 2.3 oz)   02/16/24 0500 (!) 162 kg (358 lb 0.4 oz)   02/15/24 0532 (!) 163 kg (360 lb 3.7 oz)   02/14/24 0600 (!) 162 kg (357 lb 5.9 oz)   02/13/24 0500 (!) 162 kg (357 lb 9.4 oz)   02/12/24 1352 (!) 160 kg (352 lb 4.7 oz)   02/12/24 0500 (!) 166 kg (367 lb 1.1 oz)   02/11/24 0500 (!) 169 kg (372 lb 2.2 oz)   02/10/24 0500 (!) 168 kg (370 lb 9.5 oz)   02/09/24 0600 (!) 168 kg (370 lb 9.5 oz)   02/08/24 0600 (!) 165 kg (363 lb 15.7 oz)   02/07/24 0600 (!) 166 kg (366 lb 10 oz)   02/06/24 0419 (!) 166 kg (366 lb 10 oz)   02/05/24 0500 (!) 167 kg (367 lb 4.6 oz)   02/04/24 0500 (!) 167 kg (367 lb 8.1 oz)   02/03/24 0500 (!) 166 kg (366 lb 6.5 oz)   02/02/24 0500 (!) 168 kg (369 lb 14.9 oz)   02/01/24 0613 (!) 169 kg (372 lb 5.7 oz)   01/31/24 0500 (!) 168 kg (371 lb 4.1 oz)   01/30/24 0500 (!) 169 kg (372 lb 12.8 oz)   01/29/24 0232 (!) 169 kg (372 lb 5.7 oz)   01/28/24 0500 (!) 167 kg (368 lb 6.2 oz)   01/26/24 0400 (!) 169 kg (372 lb 2.2 oz)   01/25/24 0417 (!) 167 kg (368 lb 9.8 oz)   01/24/24 0608 (!) 168 kg (371 lb 7.6 oz)   01/23/24 0500 (!) 168 kg (369 lb 14.9 oz)   01/22/24 0500 (!) 168 kg (371 lb 4.1 oz)   01/21/24 0500 (!) 168 kg (371 lb 4.1 oz)   01/20/24 0500 (!) 168 kg (371 lb 7.6 oz)   01/19/24 0500 (!) 171 kg (376 lb 1.7 oz)   01/18/24 0500 (!) 172 kg (380 lb 1.2 oz)   01/17/24 0614 (!) 172 kg (379 lb 6.6 oz)   01/16/24 0500 (!) 171 kg (378 lb 1.4 oz)   01/15/24 0500 (!) 169 kg (372 lb 2.2 oz)   01/14/24 0546 (!) 169 kg  (373 lb 7.4 oz)   01/13/24 0507 (!) 171 kg (376 lb 5.2 oz)   01/12/24 0600 (!) 170 kg (374 lb 12.5 oz)   01/11/24 0600 (!) 169 kg (371 lb 14.7 oz)   01/10/24 0600 (!) 169 kg (371 lb 11.1 oz)   01/09/24 0500 (!) 168 kg (370 lb 9.5 oz)   01/08/24 0448 (!) 168 kg (370 lb 9.5 oz)   01/07/24 0454 (!) 167 kg (367 lb 15.2 oz)   01/06/24 0500 (!) 166 kg (366 lb 10 oz)   01/05/24 0555 (!) 165 kg (364 lb 6.7 oz)   01/04/24 0500 (!) 165 kg (363 lb 12.1 oz)   01/03/24 0500 (!) 166 kg (365 lb 1.3 oz)   01/02/24 0500 (!) 167 kg (367 lb 4.6 oz)   01/01/24 0500 (!) 166 kg (365 lb 11.9 oz)   12/31/23 0500 (!) 160 kg (352 lb 4.7 oz)   12/30/23 0500 (!) 167 kg (367 lb 15.2 oz)   12/29/23 0500 (!) 166 kg (366 lb 10 oz)   12/28/23 0500 (!) 165 kg (364 lb 13.8 oz)   12/27/23 0500 (!) 166 kg (367 lb 1.1 oz)   12/26/23 0500 (!) 167 kg (367 lb 4.6 oz)   12/25/23 0500 (!) 165 kg (364 lb 3.2 oz)   12/24/23 0500 (!) 164 kg (362 lb 7 oz)   12/23/23 0500 (!) 163 kg (360 lb 0.2 oz)   12/22/23 0600 (!) 163 kg (358 lb 14.5 oz)   12/21/23 0500 (!) 163 kg (359 lb 12.7 oz)   12/20/23 0500 (!) 162 kg (357 lb 9.4 oz)   12/19/23 0550 (!) 163 kg (359 lb 5.6 oz)   12/18/23 0500 (!) 161 kg (355 lb 13.2 oz)   12/17/23 0500 (!) 160 kg (353 lb 13.4 oz)   12/16/23 1541 (!) 160 kg (353 lb 3.2 oz)   12/16/23 0500 (!) 165 kg (364 lb 13.8 oz)   12/15/23 0500 (!) 165 kg (362 lb 10.5 oz)   12/14/23 0500 (!) 157 kg (345 lb 14.4 oz)   12/13/23 0500 (!) 153 kg (337 lb 4.9 oz)   12/12/23 0500 (!) 155 kg (341 lb 0.8 oz)   12/11/23 1049 (!) 155 kg (341 lb 0.8 oz)   12/11/23 0500 (!) 160 kg (353 lb 13.4 oz)   12/10/23 0532 (!) 162 kg (356 lb 7.7 oz)   12/09/23 0500 (!) 151 kg (332 lb 10.8 oz)   12/08/23 0500 (!) 153 kg (336 lb 13.8 oz)   12/06/23 0500 (!) 154 kg (340 lb 6.2 oz)   12/05/23 0607 (!) 161 kg (354 lb 15.1 oz)   12/04/23 0500 (!) 161 kg (354 lb 4.5 oz)   12/03/23 0400 (!) 161 kg (354 lb 11.5 oz)   11/21/23 1155 (!) 162 kg (357 lb 12.8 oz)   11/09/23 0500 (!) 160  kg (353 lb 9.9 oz)   11/06/23 1422 (!) 158 kg (348 lb 5.2 oz)   11/02/23 1155 (!) 158 kg (348 lb 15.8 oz)   09/11/23 0030 (!) 151 kg (334 lb)   09/10/23 1844 (!) 154 kg (338 lb 10 oz)   08/30/23 1112 (!) 157 kg (347 lb)   08/01/23 0932 (!) 158 kg (347 lb 10.7 oz)   07/31/23 2347 (!) 163 kg (358 lb 7.5 oz)   06/16/23 2333 (!) 155 kg (342 lb 2.5 oz)   06/16/23 1053 (!) 155 kg (342 lb 3.2 oz)   06/15/23 0505 (!) 149 kg (328 lb 14.4 oz)   06/14/23 0455 (!) 149 kg (328 lb 4.8 oz)   06/13/23 1703 (!) 149 kg (328 lb 11.2 oz)   06/13/23 0600 (!) 170 kg (374 lb 12.5 oz)   06/12/23 0420 (!) 160 kg (352 lb 11.8 oz)   06/11/23 0447 (!) 150 kg (331 lb 5.6 oz)   06/10/23 0500 (!) 150 kg (330 lb 14.6 oz)   06/09/23 0536 (!) 156 kg (343 lb 7.6 oz)   06/08/23 1826 (!) 156 kg (344 lb 11.2 oz)   06/08/23 0522 (!) 158 kg (348 lb 8.8 oz)   06/07/23 0548 (!) 155 kg (342 lb 9.5 oz)   06/06/23 0515 (!) 155 kg (341 lb 14.9 oz)   06/05/23 0445 (!) 155 kg (341 lb 9.6 oz)   06/05/23 0348 (!) 158 kg (349 lb 3.3 oz)   06/04/23 0555 (!) 157 kg (346 lb 3.2 oz)   06/03/23 0539 (!) 158 kg (349 lb)   06/02/23 0548 (!) 163 kg (359 lb 3.2 oz)   06/01/23 0600 (!) 164 kg (362 lb)   05/31/23 0507 (!) 164 kg (362 lb 4.8 oz)   05/30/23 0635 (!) 164 kg (362 lb 7 oz)   05/29/23 0500 (!) 167 kg (368 lb 2.7 oz)   05/28/23 0600 (!) 167 kg (367 lb 11.6 oz)   05/27/23 0600 (!) 167 kg (369 lb 0.8 oz)   05/26/23 0529 (!) 167 kg (369 lb 3.2 oz)   05/25/23 0600 (!) 168 kg (370 lb 3.2 oz)   05/24/23 0600 (!) 166 kg (366 lb 9.6 oz)   05/22/23 0529 (!) 169 kg (373 lb 7.4 oz)   05/21/23 0600 (!) 169 kg (371 lb 12.8 oz)   05/20/23 0600 (!) 171 kg (376 lb 5.2 oz)   05/19/23 0300 (!) 168 kg (370 lb 14.4 oz)   05/18/23 1912 (!) 168 kg (371 lb 7.6 oz)   05/18/23 0600 (!) 169 kg (371 lb 11.1 oz)   05/16/23 0700 (!) 171 kg (377 lb 4.8 oz)   05/14/23 0500 (!) 171 kg (377 lb 3.3 oz)   05/12/23 1143 (!) 170 kg (375 lb)   05/06/23 0258 (!) 170 kg (375 lb 8 oz)   04/19/23 0909 (!)  163 kg (359 lb)   04/03/23 1906 (!) 168 kg (370 lb)   03/27/23 0938 (!) 170 kg (373 lb 10.9 oz)   03/17/23 1153 (!) 168 kg (370 lb)   01/27/23 1501 (!) 168 kg (370 lb)   12/22/22 1501 (!) 171 kg (376 lb)   11/08/22 1035 (!) 161 kg (355 lb)   10/01/22 1141 (!) 164 kg (360 lb 10.8 oz)   05/18/22 1311 (!) 155 kg (341 lb)   03/24/22 1432 (!) 155 kg (341 lb)   03/02/22 1412 (!) 155 kg (341 lb)   01/12/22 1317 (!) 155 kg (341 lb)   12/30/21 1431 (!) 155 kg (341 lb)   12/01/21 1144 (!) 155 kg (341 lb 11.4 oz)   12/01/21 0843 (!) 157 kg (346 lb)   11/22/21 0839 (!) 157 kg (346 lb)   11/15/21 1148 (!) 157 kg (346 lb 12.5 oz)   11/09/21 1139 (!) 157 kg (345 lb 7.4 oz)   11/05/21 1130 (!) 159 kg (351 lb 3.1 oz)   11/02/21 1121 (!) 161 kg (356 lb)   10/27/21 1048 (!) 161 kg (356 lb)   10/21/21 1418 (!) 162 kg (356 lb 14.8 oz)          Wt Change Observation Stable x 1 month     Estimated/Assessed Needs  Estimated Needs based on: Adjusted Body Weight       Energy Requirements 25 kcal/kg    EST Needs (kcal/day) 2650       Protein Requirements 1.0-1.2 g/kg   EST Daily Needs (g/day) 106-127       Fluid Requirements 25 ml/kg    Estimated Needs (mL/day) 2650     Labs/Medications         Pertinent Labs Reviewed.   Results from last 7 days   Lab Units 02/28/24  0516   SODIUM mmol/L 138   POTASSIUM mmol/L 4.0   CHLORIDE mmol/L 107   CO2 mmol/L 22.5   BUN mg/dL 17   CREATININE mg/dL 0.48*   CALCIUM mg/dL 8.4*   GLUCOSE mg/dL 104*         COVID19   Date Value Ref Range Status   03/04/2024 Not Detected Not Detected - Ref. Range Final     Lab Results   Component Value Date    HGBA1C 5.50 01/31/2024         Pertinent Medications Reviewed.     Malnutrition Severity Assessment              Nutrition Diagnosis         Nutrition Dx Problem 1 Increased nutrient needs related to increased protein demand as evidenced by impaired skin integrity.     Nutrition Intervention           Current Nutrition Orders & Evaluation of Intake       Current PO Diet  Diet: Diabetic Diets, High Protein Diet; Consistent Carbohydrate; Texture: Regular Texture (IDDSI 7); Fluid Consistency: Thin (IDDSI 0)   Supplement No active supplement orders           Nutrition Intervention/Prescription        High protein diet         Medical Nutrition Therapy/Nutrition Education          Learner     Readiness Patient  Acceptance     Method     Response Explanation  Verbalizes understanding     Monitor/Evaluation        Monitor PO intake, Skin status     Nutrition Discharge Plan         Continue high protein diet upon discharge      Electronically signed by:  Caryl Littlejohn RD  03/04/24 14:14 EST

## 2024-03-05 LAB
GLUCOSE BLDC GLUCOMTR-MCNC: 114 MG/DL (ref 70–99)
GLUCOSE BLDC GLUCOMTR-MCNC: 123 MG/DL (ref 70–99)
GLUCOSE BLDC GLUCOMTR-MCNC: 130 MG/DL (ref 70–99)
GLUCOSE BLDC GLUCOMTR-MCNC: 138 MG/DL (ref 70–99)

## 2024-03-05 PROCEDURE — 63710000001 INSULIN DETEMIR PER 5 UNITS: Performed by: PHYSICIAN ASSISTANT

## 2024-03-05 PROCEDURE — 82948 REAGENT STRIP/BLOOD GLUCOSE: CPT

## 2024-03-05 PROCEDURE — 82948 REAGENT STRIP/BLOOD GLUCOSE: CPT | Performed by: INTERNAL MEDICINE

## 2024-03-05 PROCEDURE — 63710000001 INSULIN LISPRO (HUMAN) PER 5 UNITS: Performed by: INTERNAL MEDICINE

## 2024-03-05 RX ADMIN — FERROUS SULFATE TAB 325 MG (65 MG ELEMENTAL FE) 325 MG: 325 (65 FE) TAB at 09:26

## 2024-03-05 RX ADMIN — EMPAGLIFLOZIN 10 MG: 10 TABLET, FILM COATED ORAL at 09:26

## 2024-03-05 RX ADMIN — PREGABALIN 100 MG: 100 CAPSULE ORAL at 16:55

## 2024-03-05 RX ADMIN — PREGABALIN 100 MG: 100 CAPSULE ORAL at 21:04

## 2024-03-05 RX ADMIN — PREGABALIN 100 MG: 100 CAPSULE ORAL at 09:26

## 2024-03-05 RX ADMIN — INSULIN DETEMIR 40 UNITS: 100 INJECTION, SOLUTION SUBCUTANEOUS at 09:26

## 2024-03-05 RX ADMIN — LISINOPRIL 2.5 MG: 2.5 TABLET ORAL at 09:26

## 2024-03-05 RX ADMIN — SERTRALINE HYDROCHLORIDE 50 MG: 50 TABLET ORAL at 21:04

## 2024-03-05 RX ADMIN — INSULIN LISPRO 4 UNITS: 100 INJECTION, SOLUTION INTRAVENOUS; SUBCUTANEOUS at 21:09

## 2024-03-05 RX ADMIN — OXYCODONE AND ACETAMINOPHEN 1 TABLET: 7.5; 325 TABLET ORAL at 04:12

## 2024-03-05 RX ADMIN — OXYCODONE AND ACETAMINOPHEN 1 TABLET: 7.5; 325 TABLET ORAL at 13:30

## 2024-03-05 RX ADMIN — Medication 500 MG: at 21:04

## 2024-03-05 RX ADMIN — ATORVASTATIN CALCIUM 10 MG: 10 TABLET, FILM COATED ORAL at 21:04

## 2024-03-05 RX ADMIN — Medication 500 MG: at 09:27

## 2024-03-05 RX ADMIN — BACLOFEN 10 MG: 10 TABLET ORAL at 03:11

## 2024-03-05 RX ADMIN — BACLOFEN 10 MG: 10 TABLET ORAL at 11:58

## 2024-03-05 RX ADMIN — Medication 10 MG: at 21:04

## 2024-03-05 RX ADMIN — BACITRACIN 0.9 G: 500 OINTMENT TOPICAL at 09:27

## 2024-03-05 RX ADMIN — IBUPROFEN 400 MG: 400 TABLET ORAL at 04:53

## 2024-03-05 RX ADMIN — APIXABAN 5 MG: 5 TABLET, FILM COATED ORAL at 09:26

## 2024-03-05 RX ADMIN — IBUPROFEN 400 MG: 400 TABLET ORAL at 16:55

## 2024-03-05 RX ADMIN — CYANOCOBALAMIN TAB 500 MCG 1000 MCG: 500 TAB at 09:26

## 2024-03-05 RX ADMIN — INSULIN DETEMIR 40 UNITS: 100 INJECTION, SOLUTION SUBCUTANEOUS at 21:04

## 2024-03-05 RX ADMIN — APIXABAN 5 MG: 5 TABLET, FILM COATED ORAL at 21:04

## 2024-03-05 NOTE — PLAN OF CARE
Goal Outcome Evaluation:  Plan of Care Reviewed With: patient        Progress: no change  Outcome Evaluation: Alert and oriented x4; able to make needs known to staff. Medicated with Baclofen x1, Percocet x2 and Motrin for complaints left shoulder and hip pain. Continues to be incontinent of stool at times; uses urinal to void. Has been awake entire shift watching tv or phone. Has been argumentative and rude to staff at times. Call light within reach; care plan ongoing.

## 2024-03-05 NOTE — PLAN OF CARE
Goal Outcome Evaluation:  Plan of Care Reviewed With: patient        Progress: improving  Outcome Evaluation: Resident alert, oriented, able to make needs known to staff. remains bedbound. Continues on Ozempic injections and has lost 30 lbs now. Is still hoping for hip surgery and eventually walk after surger. Medicated for prn pain x1, effective, medicated with prn baclofen x1, effective, and prn Motrin x1, effective. Wound nurse here for f/u on woundcare. Resident pleasant and cooperative .

## 2024-03-05 NOTE — SIGNIFICANT NOTE
Wound Eval / Progress Noted    KEO Dominguez     Patient Name: Jose Shaikh  : 1960  MRN: [id number]  Today's Date: 3/5/2024                 Admit Date: 2023    Visit Dx:    ICD-10-CM ICD-9-CM   1. Difficulty in walking  R26.2 719.7   2. Type 2 diabetes mellitus with other specified complication, unspecified whether long term insulin use  E11.69 250.80         Debility    Ulcer of right foot    Ulcerated, foot, left, limited to breakdown of skin    Onychomycosis        Past Medical History:   Diagnosis Date    Absence of toe of right foot     Acute osteomyelitis of left calcaneus  2021    Anxiety and depression     Arthritis     Cancer     Chronic pain     STATES HIS PAIN IS 10/10 AAT    Claustrophobia     Corns and callus     Diabetic ulcer of left heel associated with type 2 DM 2021    Diabetic ulcer of left heel associated with type 2 DM 2021    Diabetic ulcer of right midfoot associated with type 2 DM 2021    Difficulty walking     Essential hypertension 2021    Hammertoe     Hyperlipidemia LDL goal <100 2021    Ingrown toenail     Obesity     Paroxysmal atrial fibrillation 2021    Polyneuropathy     Pressure ulcer, stage 1     Tinea unguium     Type 2 diabetes mellitus with polyneuropathy         Past Surgical History:   Procedure Laterality Date    CYST REMOVAL      center of back; benign    EYE SURGERY      INCISION AND DRAINAGE ABSCESS      back    INCISION AND DRAINAGE LEG Right 12/10/2021    Procedure: INCISION AND DRAINAGE LOWER EXTREMITY;  Surgeon: Ash Leyva DPM;  Location: [facility] MAIN OR;  Service: Podiatry;  Laterality: Right;    OTHER SURGICAL HISTORY      Surgical clips left foot    TOE SURGERY Right     Removal of 5th toe    TRANS METATARSAL AMPUTATION Right 2021    Procedure: AMPUTATION TRANS METATARSAL;  Surgeon: Ash Leyva DPM;  Location: [facility] MAIN OR;  Service: Podiatry;  Laterality: Right;    VASCULAR  SURGERY      WRIST SURGERY Left     repair of injury         Physical Assessment:  Wound 01/22/24 1343 Left anterior fourth toe Abrasion (Active)   Wound Image   03/05/24 1156   Dressing Appearance dry;intact 03/05/24 1156   Closure None 03/05/24 1156   Base dry 03/05/24 1156   Periwound dry;intact;pink 03/05/24 1156   Periwound Temperature warm 03/05/24 1156   Periwound Skin Turgor soft 03/05/24 1156   Edges rolled/closed 03/05/24 1156   Drainage Amount none 03/05/24 1156   Care, Wound cleansed with;sterile normal saline;honey applied 03/05/24 1156   Dressing Care dressing applied;other (see comments) 03/05/24 1156   Periwound Care absorptive dressing applied 03/05/24 1156      Wound Check / Follow-up: Patient seen today for wound follow-up and dressing change. Patient is awake and alert at time of visit. Ulceration to left fourth toe with dry, crusted tissue. Periwound tissue is dry and intact. Cleansed with normal saline and gauze. Applied medi-honey and secured with Band-Aid. Recommending to continue current treatment.    Patient denies any other wounds / skin issues and the need for further assessment at this time.        Impression: Ulceration to left fourth toe.      Short term goals: Regain skin integrity, skin protection, every other day dressing changes.      Dottie Guevara RN    3/5/2024    13:33 EST

## 2024-03-06 LAB
GLUCOSE BLDC GLUCOMTR-MCNC: 107 MG/DL (ref 70–99)
GLUCOSE BLDC GLUCOMTR-MCNC: 133 MG/DL (ref 70–99)
GLUCOSE BLDC GLUCOMTR-MCNC: 154 MG/DL (ref 70–99)
GLUCOSE BLDC GLUCOMTR-MCNC: 155 MG/DL (ref 70–99)

## 2024-03-06 PROCEDURE — 82948 REAGENT STRIP/BLOOD GLUCOSE: CPT | Performed by: INTERNAL MEDICINE

## 2024-03-06 PROCEDURE — 63710000001 INSULIN DETEMIR PER 5 UNITS: Performed by: PHYSICIAN ASSISTANT

## 2024-03-06 PROCEDURE — 82948 REAGENT STRIP/BLOOD GLUCOSE: CPT

## 2024-03-06 PROCEDURE — 63710000001 INSULIN LISPRO (HUMAN) PER 5 UNITS: Performed by: INTERNAL MEDICINE

## 2024-03-06 PROCEDURE — 63710000001 ONDANSETRON ODT 4 MG TABLET DISPERSIBLE: Performed by: INTERNAL MEDICINE

## 2024-03-06 RX ORDER — SEMAGLUTIDE 0.68 MG/ML
0.25 INJECTION, SOLUTION SUBCUTANEOUS WEEKLY
Qty: 3 ML | Refills: 0 | Status: CANCELLED | OUTPATIENT
Start: 2024-03-06 | End: 2024-04-11

## 2024-03-06 RX ORDER — BACLOFEN 10 MG/1
10 TABLET ORAL 3 TIMES DAILY PRN
Status: DISPENSED | OUTPATIENT
Start: 2024-03-06 | End: 2024-03-13

## 2024-03-06 RX ADMIN — Medication 10 MG: at 22:19

## 2024-03-06 RX ADMIN — INSULIN DETEMIR 40 UNITS: 100 INJECTION, SOLUTION SUBCUTANEOUS at 09:26

## 2024-03-06 RX ADMIN — INSULIN DETEMIR 40 UNITS: 100 INJECTION, SOLUTION SUBCUTANEOUS at 22:19

## 2024-03-06 RX ADMIN — BACLOFEN 10 MG: 10 TABLET ORAL at 02:32

## 2024-03-06 RX ADMIN — CYANOCOBALAMIN TAB 500 MCG 1000 MCG: 500 TAB at 09:24

## 2024-03-06 RX ADMIN — ACETAMINOPHEN 650 MG: 325 TABLET ORAL at 09:25

## 2024-03-06 RX ADMIN — BACITRACIN 0.9 G: 500 OINTMENT TOPICAL at 09:25

## 2024-03-06 RX ADMIN — PREGABALIN 100 MG: 100 CAPSULE ORAL at 09:24

## 2024-03-06 RX ADMIN — Medication 500 MG: at 09:24

## 2024-03-06 RX ADMIN — BACLOFEN 10 MG: 10 TABLET ORAL at 12:44

## 2024-03-06 RX ADMIN — LISINOPRIL 2.5 MG: 2.5 TABLET ORAL at 09:24

## 2024-03-06 RX ADMIN — APIXABAN 5 MG: 5 TABLET, FILM COATED ORAL at 09:24

## 2024-03-06 RX ADMIN — OXYCODONE AND ACETAMINOPHEN 1 TABLET: 7.5; 325 TABLET ORAL at 19:44

## 2024-03-06 RX ADMIN — SERTRALINE HYDROCHLORIDE 50 MG: 50 TABLET ORAL at 22:27

## 2024-03-06 RX ADMIN — PREGABALIN 100 MG: 100 CAPSULE ORAL at 22:19

## 2024-03-06 RX ADMIN — FERROUS SULFATE TAB 325 MG (65 MG ELEMENTAL FE) 325 MG: 325 (65 FE) TAB at 09:24

## 2024-03-06 RX ADMIN — OXYCODONE AND ACETAMINOPHEN 1 TABLET: 7.5; 325 TABLET ORAL at 07:31

## 2024-03-06 RX ADMIN — PREGABALIN 100 MG: 100 CAPSULE ORAL at 16:15

## 2024-03-06 RX ADMIN — Medication 500 MG: at 22:19

## 2024-03-06 RX ADMIN — APIXABAN 5 MG: 5 TABLET, FILM COATED ORAL at 22:19

## 2024-03-06 RX ADMIN — ATORVASTATIN CALCIUM 10 MG: 10 TABLET, FILM COATED ORAL at 22:19

## 2024-03-06 RX ADMIN — BISMUTH SUBSALICYLATE 524 MG: 262 TABLET, CHEWABLE ORAL at 04:58

## 2024-03-06 RX ADMIN — ONDANSETRON 4 MG: 4 TABLET, ORALLY DISINTEGRATING ORAL at 14:39

## 2024-03-06 RX ADMIN — INSULIN LISPRO 4 UNITS: 100 INJECTION, SOLUTION INTRAVENOUS; SUBCUTANEOUS at 12:06

## 2024-03-06 RX ADMIN — BACLOFEN 10 MG: 10 TABLET ORAL at 22:27

## 2024-03-06 RX ADMIN — EMPAGLIFLOZIN 10 MG: 10 TABLET, FILM COATED ORAL at 09:25

## 2024-03-06 NOTE — PLAN OF CARE
Goal Outcome Evaluation: pt VSS, AAOX4, educated on need to get out of bed and start exercising to prevent further increased weakness and deconditioning.Baclofen given @0232.

## 2024-03-07 LAB
GLUCOSE BLDC GLUCOMTR-MCNC: 129 MG/DL (ref 70–99)
GLUCOSE BLDC GLUCOMTR-MCNC: 146 MG/DL (ref 70–99)
GLUCOSE BLDC GLUCOMTR-MCNC: 150 MG/DL (ref 70–99)
GLUCOSE BLDC GLUCOMTR-MCNC: 171 MG/DL (ref 70–99)
SARS-COV-2 RNA RESP QL NAA+PROBE: NOT DETECTED

## 2024-03-07 PROCEDURE — 87635 SARS-COV-2 COVID-19 AMP PRB: CPT | Performed by: PHYSICIAN ASSISTANT

## 2024-03-07 PROCEDURE — 82948 REAGENT STRIP/BLOOD GLUCOSE: CPT | Performed by: INTERNAL MEDICINE

## 2024-03-07 PROCEDURE — 63710000001 ONDANSETRON ODT 4 MG TABLET DISPERSIBLE: Performed by: INTERNAL MEDICINE

## 2024-03-07 PROCEDURE — 82948 REAGENT STRIP/BLOOD GLUCOSE: CPT

## 2024-03-07 PROCEDURE — 63710000001 INSULIN DETEMIR PER 5 UNITS: Performed by: PHYSICIAN ASSISTANT

## 2024-03-07 PROCEDURE — 63710000001 INSULIN LISPRO (HUMAN) PER 5 UNITS: Performed by: INTERNAL MEDICINE

## 2024-03-07 RX ADMIN — ONDANSETRON 4 MG: 4 TABLET, ORALLY DISINTEGRATING ORAL at 12:57

## 2024-03-07 RX ADMIN — OXYCODONE AND ACETAMINOPHEN 1 TABLET: 7.5; 325 TABLET ORAL at 04:10

## 2024-03-07 RX ADMIN — Medication 10 MG: at 20:20

## 2024-03-07 RX ADMIN — INSULIN LISPRO 4 UNITS: 100 INJECTION, SOLUTION INTRAVENOUS; SUBCUTANEOUS at 07:51

## 2024-03-07 RX ADMIN — Medication 500 MG: at 09:39

## 2024-03-07 RX ADMIN — INSULIN DETEMIR 40 UNITS: 100 INJECTION, SOLUTION SUBCUTANEOUS at 20:20

## 2024-03-07 RX ADMIN — PREGABALIN 100 MG: 100 CAPSULE ORAL at 09:39

## 2024-03-07 RX ADMIN — ATORVASTATIN CALCIUM 10 MG: 10 TABLET, FILM COATED ORAL at 20:20

## 2024-03-07 RX ADMIN — OXYCODONE AND ACETAMINOPHEN 1 TABLET: 7.5; 325 TABLET ORAL at 16:07

## 2024-03-07 RX ADMIN — BACITRACIN 0.9 G: 500 OINTMENT TOPICAL at 09:39

## 2024-03-07 RX ADMIN — BACLOFEN 10 MG: 10 TABLET ORAL at 09:45

## 2024-03-07 RX ADMIN — IBUPROFEN 400 MG: 400 TABLET ORAL at 17:30

## 2024-03-07 RX ADMIN — EMPAGLIFLOZIN 10 MG: 10 TABLET, FILM COATED ORAL at 09:39

## 2024-03-07 RX ADMIN — APIXABAN 5 MG: 5 TABLET, FILM COATED ORAL at 09:39

## 2024-03-07 RX ADMIN — PREGABALIN 100 MG: 100 CAPSULE ORAL at 20:20

## 2024-03-07 RX ADMIN — Medication 500 MG: at 20:20

## 2024-03-07 RX ADMIN — CYANOCOBALAMIN TAB 500 MCG 1000 MCG: 500 TAB at 09:39

## 2024-03-07 RX ADMIN — BACLOFEN 10 MG: 10 TABLET ORAL at 19:19

## 2024-03-07 RX ADMIN — APIXABAN 5 MG: 5 TABLET, FILM COATED ORAL at 20:20

## 2024-03-07 RX ADMIN — SERTRALINE HYDROCHLORIDE 50 MG: 50 TABLET ORAL at 20:20

## 2024-03-07 RX ADMIN — IBUPROFEN 400 MG: 400 TABLET ORAL at 01:51

## 2024-03-07 RX ADMIN — FERROUS SULFATE TAB 325 MG (65 MG ELEMENTAL FE) 325 MG: 325 (65 FE) TAB at 08:09

## 2024-03-07 RX ADMIN — INSULIN DETEMIR 40 UNITS: 100 INJECTION, SOLUTION SUBCUTANEOUS at 09:39

## 2024-03-07 RX ADMIN — PREGABALIN 100 MG: 100 CAPSULE ORAL at 16:00

## 2024-03-07 RX ADMIN — LISINOPRIL 2.5 MG: 2.5 TABLET ORAL at 09:39

## 2024-03-07 NOTE — PLAN OF CARE
Goal Outcome Evaluation:  Plan of Care Reviewed With: patient        Progress: no change  Outcome Evaluation: Patient is alert and oriented x4. C/O severe popping hip pain in left hip at 0731. Given PRN Percocet. Med somewhat effective. Given Tylenol at 0925 for c/o breakthrough left hip pain and left shoulder pain. Med effective. Given Baclofen at 1244 for c/o muscle spasms in left hip. Med effective. Given Zofran for intermittent nausea this afternoon as well. Med effective. Refuses turns unless getting cleaned up after BM. Educated on need for weight shifting and turns to prevent pressure areas. Verbalized understanding. Patient continuing to lose weight with the Ozempic injections and voiced he hopes to be able to get hip replacement surgery soon. Voices needs. Con't current POC.

## 2024-03-07 NOTE — PLAN OF CARE
Goal Outcome Evaluation:NO changes pt continues to refuse getting up in chair or using bedside command. AAOX4, personal items and call light at bedside. Makes needs known.

## 2024-03-07 NOTE — PLAN OF CARE
Goal Outcome Evaluation:      Patient is alert and oriented x4. Patient refuses to get out of bed or use bedside commode. Patient reported nausea and received Zofran. Treatment effective. Patient requested pain medication for hip and shoulder pain Patient states pain stays 10 on a 1-10 scale and medication was not effective. Will continue with current care plan. Call light within reach.

## 2024-03-08 LAB
GLUCOSE BLDC GLUCOMTR-MCNC: 104 MG/DL (ref 70–99)
GLUCOSE BLDC GLUCOMTR-MCNC: 105 MG/DL (ref 70–99)
GLUCOSE BLDC GLUCOMTR-MCNC: 117 MG/DL (ref 70–99)
GLUCOSE BLDC GLUCOMTR-MCNC: 130 MG/DL (ref 70–99)
GLUCOSE BLDC GLUCOMTR-MCNC: 135 MG/DL (ref 70–99)

## 2024-03-08 PROCEDURE — 82948 REAGENT STRIP/BLOOD GLUCOSE: CPT

## 2024-03-08 PROCEDURE — 99308 SBSQ NF CARE LOW MDM 20: CPT | Performed by: PHYSICIAN ASSISTANT

## 2024-03-08 PROCEDURE — 63710000001 INSULIN DETEMIR PER 5 UNITS: Performed by: PHYSICIAN ASSISTANT

## 2024-03-08 PROCEDURE — 82948 REAGENT STRIP/BLOOD GLUCOSE: CPT | Performed by: INTERNAL MEDICINE

## 2024-03-08 RX ADMIN — OXYCODONE AND ACETAMINOPHEN 1 TABLET: 7.5; 325 TABLET ORAL at 02:50

## 2024-03-08 RX ADMIN — APIXABAN 5 MG: 5 TABLET, FILM COATED ORAL at 20:26

## 2024-03-08 RX ADMIN — INSULIN DETEMIR 40 UNITS: 100 INJECTION, SOLUTION SUBCUTANEOUS at 20:27

## 2024-03-08 RX ADMIN — BACLOFEN 10 MG: 10 TABLET ORAL at 06:23

## 2024-03-08 RX ADMIN — LISINOPRIL 2.5 MG: 2.5 TABLET ORAL at 09:00

## 2024-03-08 RX ADMIN — SERTRALINE HYDROCHLORIDE 50 MG: 50 TABLET ORAL at 20:27

## 2024-03-08 RX ADMIN — SIMETHICONE 80 MG: 80 TABLET, CHEWABLE ORAL at 20:47

## 2024-03-08 RX ADMIN — OXYCODONE AND ACETAMINOPHEN 1 TABLET: 7.5; 325 TABLET ORAL at 09:31

## 2024-03-08 RX ADMIN — INSULIN DETEMIR 40 UNITS: 100 INJECTION, SOLUTION SUBCUTANEOUS at 09:00

## 2024-03-08 RX ADMIN — PREGABALIN 100 MG: 100 CAPSULE ORAL at 20:27

## 2024-03-08 RX ADMIN — PREGABALIN 100 MG: 100 CAPSULE ORAL at 16:17

## 2024-03-08 RX ADMIN — OXYCODONE AND ACETAMINOPHEN 1 TABLET: 7.5; 325 TABLET ORAL at 20:26

## 2024-03-08 RX ADMIN — PREGABALIN 100 MG: 100 CAPSULE ORAL at 09:00

## 2024-03-08 RX ADMIN — Medication 500 MG: at 20:27

## 2024-03-08 RX ADMIN — Medication 10 MG: at 20:27

## 2024-03-08 RX ADMIN — CYANOCOBALAMIN TAB 500 MCG 1000 MCG: 500 TAB at 09:01

## 2024-03-08 RX ADMIN — BACLOFEN 10 MG: 10 TABLET ORAL at 18:05

## 2024-03-08 RX ADMIN — BACITRACIN 0.9 G: 500 OINTMENT TOPICAL at 09:02

## 2024-03-08 RX ADMIN — EMPAGLIFLOZIN 10 MG: 10 TABLET, FILM COATED ORAL at 09:01

## 2024-03-08 RX ADMIN — Medication 500 MG: at 09:00

## 2024-03-08 RX ADMIN — FERROUS SULFATE TAB 325 MG (65 MG ELEMENTAL FE) 325 MG: 325 (65 FE) TAB at 08:59

## 2024-03-08 RX ADMIN — ATORVASTATIN CALCIUM 10 MG: 10 TABLET, FILM COATED ORAL at 20:27

## 2024-03-08 NOTE — NURSING NOTE
Spoke with CT, Rad Holding RN, and Radiology Tech, all state patient is on schedule, but unable to tell me time patient is scheduled for procedure.  This nurse explained that patient is unaware of procedure and needs to speak with HUSAM Adame as to why was ordered before signing consent.  Requested that after that's done and if patient still wants procedure for Dayshift RN to let them know.  Mariola Kahn dayshift RN aware.  Rene Asher also made aware.  See MD notified intervention for documentation.

## 2024-03-08 NOTE — NURSING NOTE
Spoke with Eber in CT to see if this patient was on schedule for today for guided CT hip injection, stated he saw patient on list but was unable to give this nurse a time for procedure.  He stated to call Radiology Nurse this morning, they would get here around 0700.  Will make sure dayshift RN aware, so more information regarding procedure can be obtained.

## 2024-03-08 NOTE — PROGRESS NOTES
[facility]   Hospitalist Progress Note  Date: 3/8/2024  Patient Name: Jose Shaikh  : 1960  MRN: [id number]  Date of admission: 2023      Subjective   Subjective     Chief Complaint: Weakness    Summary: Jose Shaikh is a 63 y.o. male  initially hospitalized on 9/10/2023, prolonged hospitalization for treatment of management of generalized weakness, deconditioning, with acute issues of diarrhea, C. difficile negative.  Diarrhea improved.  Had small hematoma after ambulating on his foot.  Unfortunately with exhaustive efforts to try to place this gentleman after 39 days of hospitalization, we are unable to find a facility that will accept him.  He has no further acute needs or requirements for inpatient monitoring and management, his labs and vitals are stable, he is tolerating oral intake, and has been refusing turns and repositionings and other interventions with nursing staff despite recommendations.  Patient discharged in hemodynamically stable condition on 10/19/2023 to follow-up with PCP within 1 week. Unfortunately we cannot solve all of his social issues during this hospitalization due to lack of resources and participation from the patient's perspective despite all our efforts.  Patient has a financial means of making arrangements at home.  Patient appealed his discharge.  Lost his appeal.  LCD: 10/20/23, PFL beginning: 10/21/23 @ 12pm.  Due to an inability to place the patient in a.m. nursing home he has been placed in our skilled nursing facility until arrangements can be made or until he is able to care for himself.      Interval Followup: 3/8/2024    No new complaints.    He is tolerating Ozempic without any nausea or vomiting.    He reports losing some weight.    Discussed left hip injection   Discussed weight loss   Discussed that with continued weight loss he may be candidate for surgery options versus?.  Plan to reconsult Ortho and if continues to progress   Vital stable  Glucose  controlled      Review of systems: All systems reviewed and negative except what is noted above in interval follow-up   Objective   Objective     Vitals:   Temp:  [97.7 °F (36.5 °C)-98.1 °F (36.7 °C)] 97.7 °F (36.5 °C)  Heart Rate:  [85-88] 85  Resp:  [18] 18  BP: (103-108)/(58-63) 108/58    Physical Exam   Constitutional: Awake alert oriented no acute distress  Respiratory: No wheeze  GI: Abdomen: Obese  Neuro/psych:  Calm mood.  No dysarthria.  Extremities: Some lower extremity edema noted    Result Review    Result Review:  I have personally reviewed the results from the time of this admission to 3/8/2024 11:57 EST and agree with these findings:  [x]  Laboratory  [x]  Microbiology  []  Radiology  []  EKG/Telemetry   []  Cardiology/Vascular   []  Pathology  []  Old records  []  Other:    Assessment & Plan   Assessment / Plan   Assessment:  Hospital-acquired weakness  Influenza A  C. difficile diarrhea treated  Generalized weakness  Deconditioning  DM (Kami Garcia and PCP)  HTN  PAF (on Eliquis; previously seen Dr. 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  Proceed with fluoroscopic guided hip injection.  Also patient would like orthopedic referral for left hip pain.  As he continues to lose weight on Ozempic, he may be a candidate for surgical options  As needed Lasix  Continue basal insulin and SSI per protocol titrate as needed  Continue Eliquis for stroke prophylaxis  Continue probiotics  Discussed with RN    DVT prophylaxis:  Medical DVT prophylaxis orders are present.    CODE STATUS:   Code  Status (Patient has no pulse and is not breathing): CPR (Attempt to Resuscitate)  Medical Interventions (Patient has pulse or is breathing): Full Support

## 2024-03-08 NOTE — PLAN OF CARE
Goal Outcome Evaluation:  Plan of Care Reviewed With: patient        Progress: no change Patient Alert and oriented x4. Given PRN Percocet at 0931 for c/o 10/10 left hip and left shoulder pain. Med effective. Given PRN Baclofen at 1805 for left hip spasms. Med not yet effective. Patient scheduled for procedure today after lunch. Morning dose of Eliquis held per radiology for procedure. Provider aware. Patient's procedure did not occur today due to unexpected increase in volume of other pressing procedures and radiology notified nurse is now rescheduled for Monday at 1100. Provider again notified. See new orders pertaining to holding Eliquis for Monday's procedure. Patient voices needs. Con't current POC.

## 2024-03-08 NOTE — PROGRESS NOTES
"Nursing Facility Medication Regimen Review    Potentially Clinically Significant Medication Issues during this review: []Identified   [x]Not identified    Provider acknowledgement required?   []Yes   [x]No    Jose Shaikh is a 63 y.o.male admitted by Max Mehta MD , on 11/6/2023  1:34 PM , for Debility [R53.81]    Visit Vitals  /63 (BP Location: Left arm, Patient Position: Lying)   Pulse 88   Temp 98.1 °F (36.7 °C) (Oral)   Resp 18   Ht 188 cm (74.02\")   Wt (!) 157 kg (346 lb 2 oz)   SpO2 92%   BMI 44.42 kg/m²        Lab Results   Component Value Date    GLUCOSE 104 (H) 02/28/2024    BUN 17 02/28/2024    CREATININE 0.48 (L) 02/28/2024    EGFRIFNONA 134 12/20/2021    BCR 35.4 (H) 02/28/2024    K 4.0 02/28/2024    CO2 22.5 02/28/2024    CALCIUM 8.4 (L) 02/28/2024    ALBUMIN 2.9 (L) 01/31/2024    LABIL2 1.3 (L) 08/07/2019    AST 52 (H) 01/31/2024    ALT 41 01/31/2024    WBC 3.34 (L) 01/21/2024    HGB 12.6 (L) 01/21/2024    HCT 40.1 01/21/2024    MCV 83.9 01/21/2024    PLT 70 (L) 01/21/2024        Active Ambulatory Problems     Diagnosis Date Noted    Diabetic ulcer of left heel associated with type 2 DM 07/06/2021    Acute osteomyelitis of left calcaneus  08/18/2021    Diabetic ulcer of left heel associated with type 2 DM 08/18/2021    Diabetic ulcer of right midfoot associated with type 2 DM 08/18/2021    Paroxysmal atrial fibrillation 08/31/2021    Essential hypertension 08/31/2021    Hyperlipidemia LDL goal <100 08/31/2021    Cellulitis and abscess of foot 12/01/2021    High alkaline phosphatase level 12/19/2021    Osteomyelitis 10/01/2022    Onychomycosis 10/02/2022    Onychocryptosis 10/02/2022    Foot pain, bilateral 10/02/2022    Osteomyelitis of foot, right, acute 10/05/2022    Cellulitis of right foot 10/05/2022    Type 2 diabetes mellitus, with long-term current use of insulin 11/08/2022    Class 3 severe obesity due to excess calories with serious comorbidity and body mass index (BMI) of " 45.0 to 49.9 in adult 11/08/2022    Anxiety disorder, unspecified 10/07/2022    Claustrophobia 05/02/2022    Dependence on wheelchair 10/27/2022    Depression, unspecified 05/02/2022    Long term (current) use of anticoagulants 05/02/2022    Long term (current) use of oral hypoglycemic drugs 05/02/2022    Wound of foot 05/02/2022    Ulcer of right foot 05/02/2022    Orthostatic hypotension 10/07/2022    Other chronic osteomyelitis, right ankle and foot 10/07/2022    Personal history of nicotine dependence 05/02/2022    Thrombocytopenia, unspecified 10/07/2022    Unspecified open wound, right foot, initial encounter 04/03/2023    Diabetic foot infection 04/03/2023    Subacute osteomyelitis of right foot 04/06/2023    Right foot pain 05/12/2023    Sepsis 05/12/2023    Onychomycosis 05/22/2023    Foot pain, left 05/22/2023    Impaired mobility and ADLs 06/16/2023    Absence of toe of right foot 07/11/2023    Corns and callosity 07/11/2023    Disability of walking 07/11/2023    Fracture 07/11/2023    Limb swelling 07/11/2023    Polyneuropathy 07/11/2023    Pressure ulcer, stage 1 07/11/2023    Shortness of breath 07/11/2023    Generalized weakness 09/10/2023     Resolved Ambulatory Problems     Diagnosis Date Noted    Lactic acidosis 12/01/2021    Abscess of back 12/20/2021     Past Medical History:   Diagnosis Date    Anxiety and depression     Arthritis     Cancer     Chronic pain     Corns and callus     Difficulty walking     Hammertoe     Ingrown toenail     Obesity     Tinea unguium     Type 2 diabetes mellitus with polyneuropathy         Hospital Medications (active)         Dose Frequency Start End Indication    !Patient Home Medications Stored on Unit  2 Times Daily 2/21/2024 -- N/A    Admin Instructions: Patient has home medication(s) stored on the nursing unit. Please remove and give to patient before discharge.     Route: Does not apply     acetaminophen (TYLENOL) tablet 650 mg 650 mg Every 4 Hours PRN  11/6/2023 -- Pain management    Admin Instructions: Based on patient request - if ordered for moderate or severe pain, provider allows for administration of a medication prescribed for a lower pain scale.    Do not exceed 4 grams of acetaminophen in a 24 hr period. Max dose of 2gm for AST/ALT greater than 120 units/L.    If given for pain, use the following pain scale:   Mild Pain = Pain Score of 1-3, CPOT 1-2  Moderate Pain = Pain Score of 4-6, CPOT 3-4  Severe Pain = Pain Score of 7-10, CPOT 5-8     Route: Oral     ammonium lactate (LAC-HYDRIN) 12 % lotion  2 Times Daily PRN 2/21/2024 -- Dry skin    Admin Instructions: Apply to dry skin lower legs     Route: Topical     Cosign for Ordering: Accepted by Cailin Acosta MD on 2/22/2024  6:46 AM     apixaban (ELIQUIS) tablet 5 mg 5 mg Every 12 Hours Scheduled 11/6/2023 -- Afib    Admin Instructions: Tablet may be crushed and suspended in 60 mL of water or D5W and immediately delivered via NG tube.     Route: Oral     atorvastatin (LIPITOR) tablet 10 mg 10 mg Nightly 11/6/2023 -- Hyperlipidemia    Admin Instructions: Avoid grapefruit juice.     Route: Oral     bacitracin ointment 0.9 g 1 Application Daily 2/21/2024 -- Infection prevention    Admin Instructions: Apply to sore left lower leg     Route: Topical     Cosign for Ordering: Accepted by Cailin Acosta MD on 2/22/2024  6:46 AM     baclofen (LIORESAL) tablet 10 mg 10 mg 3 Times Daily PRN 3/6/2024 3/13/2024 Muscle spasms    Admin Instructions: Take with food if GI upset occurs.     Route: Oral     benzonatate (TESSALON) capsule 100 mg 100 mg 3 Times Daily PRN 1/18/2024 -- Cough    Admin Instructions: Do not crush or chew the capsules or tablets. The drug may not work as designed if the capsule or tablet is crushed or chewed. Swallow whole.  Swallow whole.  Do not crush, chew, or open capsule.     Route: Oral     bismuth subsalicylate (PEPTO BISMOL) chewable tablet 524 mg 524 mg 3 Times Daily PRN 2/5/2024  -- Heartburn    Admin Instructions: Maximum 4192 mg per 24 hours.     Route: Oral     dextrose (D50W) (25 g/50 mL) IV injection 25 g 25 g Every 15 Minutes PRN 11/6/2023 -- Hypoglycemia    Admin Instructions: Blood sugar less than 70; patient has IV access - Unresponsive, NPO or Unable To Safely Swallow     Route: Intravenous     dextrose (GLUTOSE) oral gel 15 g 15 g Every 15 Minutes PRN 11/6/2023 -- Hypoglycemia    Admin Instructions: BS<70, Patient Alert, Is not NPO, Can safely swallow.     Route: Oral     empagliflozin (JARDIANCE) tablet 10 mg 10 mg Daily 11/7/2023 -- T2DM    Route: Oral     ferrous sulfate tablet 325 mg 325 mg Daily With Breakfast 11/21/2023 -- Iron supplementation    Admin Instructions: Swallow whole. Do not crush, split, or chew. Take with food if GI upset occurs.     Route: Oral     Cosign for Ordering: Accepted by Max Mehta MD on 11/21/2023 11:34 AM     glucagon (GLUCAGEN) injection 1 mg 1 mg Every 15 Minutes PRN 11/6/2023 -- Hypoglycemia    Admin Instructions: Blood Glucose Less Than 70 - Patient Without IV Access - Unresponsive, NPO or Unable To Safely Swallow  Reconstitute powder for injection by adding 1 mL of -supplied sterile diluent or sterile water for injection to a vial containing 1 mg of the drug, to provide solutions containing 1 mg/mL. Shake vial gently to dissolve.     Route: Intramuscular     HOME MED Semaglutide(0.25 or 0.5MG/DOS) (OZEMPIC) solution pen-injector 0.5 mg 0.5 mg Every 7 Days 2/28/2024 -- T2DM    Admin Instructions: Please bring to pharmacy for identification and barcoding.     Notes to Pharmacy: Had medication filled as outpatient and we be using his own pen injector     Route: Subcutaneous     Cosign for Ordering: Accepted by Max Mehta MD on 2/27/2024 10:26 AM     Hydrocortisone (Perianal) (ANUSOL-HC) 2.5 % rectal cream  2 Times Daily 1/31/2024 -- Hemorrhoids    Route: Rectal     ibuprofen (ADVIL,MOTRIN) tablet 400 mg 400 mg  2 Times Daily PRN 1/16/2024 -- Pain management    Admin Instructions: Based on patient request - if ordered for moderate or severe pain, provider allows for administration of a medication prescribed for a lower pain scale.    Mucous membrane irritant. Do not crush or chew tablet or capsule unless administered through a feeding tube.  If given for pain, use the following pain scale:  Mild Pain = Pain Score of 1-3, CPOT 1-2  Moderate Pain = Pain Score of 4-6, CPOT 3-4  Severe Pain = Pain Score of 7-10, CPOT 5-8     Route: Oral     Cosign for Ordering: Accepted by Max Mehta MD on 1/17/2024 10:44 AM     insulin detemir (LEVEMIR) injection 40 Units 40 Units Daily 1/31/2024 -- T2DM    Admin Instructions: Do not hold basal insulin without an order. Consider requesting a dose edit, if needed.        Route: Subcutaneous     Cosign for Ordering: Accepted by Max Mehta MD on 1/31/2024 11:20 AM     insulin detemir (LEVEMIR) injection 40 Units 40 Units Nightly 1/30/2024 -- T2DM    Admin Instructions: Do not hold basal insulin without an order. Consider requesting a dose edit, if needed.        Route: Subcutaneous     Cosign for Ordering: Accepted by Max Mehta MD on 1/31/2024 11:20 AM     Insulin Lispro (humaLOG) injection 4-24 Units 4-24 Units 4 Times Daily Before Meals & Nightly 11/6/2023 -- T2DM    Admin Instructions: Correction Insulin - High Dose (Total Insulin Dose Greater Than 80 units/day, Insulin Resistant Patient, Patient With Infection, Steroid Treatment)    Blood Glucose 150-199 mg/dL - 4 units  Blood Glucose 200-249 mg/dL - 8 units  Blood Glucose 250-299 mg/dL - 12 units  Blood Glucose 300-349 mg/dL - 16 units  Blood Glucose 350-400 mg/dL - 20 units  Blood Glucose Greater Than 400 mg/dL - 24 units & Call Provider   Caution: Look alike/sound alike drug alert     Route: Subcutaneous     lisinopril (PRINIVIL,ZESTRIL) tablet 2.5 mg 2.5 mg Every 24 Hours Scheduled 11/7/2023 -- HTN     "Admin Instructions: Hold for SBP less than 100     Route: Oral     melatonin tablet 10 mg 10 mg Nightly 12/8/2023 -- Insomnia    Route: Oral     ondansetron (ZOFRAN) injection 4 mg 4 mg Every 6 Hours PRN 11/6/2023 -- N/V PRN    Admin Instructions: If BOTH ondansetron (ZOFRAN) and promethazine (PHENERGAN) are ordered use ondansetron first and THEN promethazine IF ondansetron is ineffective.     Route: Intravenous     ondansetron ODT (ZOFRAN-ODT) disintegrating tablet 4 mg 4 mg Every 6 Hours PRN 11/6/2023 -- N/V PRN    Admin Instructions: \"If multiple N/V medications ordered, use in the following order: Ondansetron, Prochlorperazine, Promethazine. Use PO unless patient refuses or patient unable to swallow.\"  Place on tongue and allow to dissolve.     Route: Oral     oxyCODONE-acetaminophen (PERCOCET) 7.5-325 MG per tablet 1 tablet 1 tablet Every 6 Hours PRN 12/5/2023 -- Arthritis    Admin Instructions: Based on patient request - if ordered for moderate or severe pain, provider allows for administration of a medication prescribed for a lower pain scale.  [NIKIA]    Do not exceed 4 grams of acetaminophen in a 24 hr period. Max dose of 2gm for AST/ALT greater than 120 units/L        If given for pain, use the following pain scale:   Mild Pain = Pain Score of 1-3, CPOT 1-2  Moderate Pain = Pain Score of 4-6, CPOT 3-4  Severe Pain = Pain Score of 7-10, CPOT 5-8     Route: Oral     pneumococcal conj. 13-valent (PREVNAR-13) vaccine 0.5 mL 0.5 mL During Hospitalization 11/6/2023 -- Immunizations    Admin Instructions:      Route: Intramuscular     pregabalin (LYRICA) capsule 100 mg 100 mg 3 Times Daily 11/7/2023 -- Polyneuropathy    Admin Instructions:      Route: Oral     saccharomyces boulardii (FLORASTOR) capsule 500 mg 500 mg 2 Times Daily 2/3/2024 -- Gut health    Route: Oral     sertraline (ZOLOFT) tablet 50 mg 50 mg Nightly 2/18/2024 -- Depression, anxiety    Route: Oral     simethicone (MYLICON) chewable tablet 80 mg 80 " mg 4 Times Daily PRN 11/6/2023 -- PRN flatulence    Route: Oral     sodium chloride 0.9 % flush 10 mL 10 mL As Needed 11/6/2023 -- Line care    Route: Intravenous     sodium chloride 0.9 % infusion 40 mL 40 mL As Needed 11/6/2023 -- Line care    Admin Instructions: Following administration of an IV intermittent medication, flush line with 40mL NS at 100mL/hr.     Route: Intravenous     vitamin B-12 (CYANOCOBALAMIN) tablet 1,000 mcg 1,000 mcg Daily 11/7/2023 -- Vitamin supplementation    Route: Oral                Psychotropic Medications  Sertraline    Potentially Clinically Significant Medication Issues/PharmD Recommendations:   Psychotropic Medication: No concerns  Opioid Use Review: PRN percocet available, no concerns noted.    Medication without an appropriate indication: N/A  Untreated indication: N/A  Missing Duration: N/A  Excessive or Inadequate Dose: N/A  Ineffective Drug Therapy: N/A  Medication Adverse Reactions/Consequences: No concerns noted  Medication Monitoring: N/A  Drug Interactions: Ibuprofen/eliquis - increased bleeding risk. Acetaminophen safer alternative to ibuprofen. No signs/symptoms bleeding noted.   Duplicate Therapy: N/A  PTA Medication Omissions (not currently ordered): N/A  Safety: N/A      Additional notes: None      In completing this Drug Regimen Review for NIMCO Hoffman, Neno Mcclelland, have reviewed the electronic medical chart contents, including but not limited to the following: Medication Administration Records (MAR), Prescriber's orders, Progress, nursing and consultants' notes, Laboratory and diagnostic test results, Other sources of information about documented expressions or indications of distress and/or changes in condition.

## 2024-03-09 PROCEDURE — 82948 REAGENT STRIP/BLOOD GLUCOSE: CPT

## 2024-03-09 PROCEDURE — 63710000001 INSULIN LISPRO (HUMAN) PER 5 UNITS: Performed by: INTERNAL MEDICINE

## 2024-03-09 PROCEDURE — 63710000001 INSULIN DETEMIR PER 5 UNITS: Performed by: PHYSICIAN ASSISTANT

## 2024-03-09 RX ADMIN — SERTRALINE HYDROCHLORIDE 50 MG: 50 TABLET ORAL at 20:18

## 2024-03-09 RX ADMIN — OXYCODONE AND ACETAMINOPHEN 1 TABLET: 7.5; 325 TABLET ORAL at 18:23

## 2024-03-09 RX ADMIN — PREGABALIN 100 MG: 100 CAPSULE ORAL at 17:00

## 2024-03-09 RX ADMIN — Medication 10 MG: at 20:18

## 2024-03-09 RX ADMIN — PREGABALIN 100 MG: 100 CAPSULE ORAL at 20:18

## 2024-03-09 RX ADMIN — APIXABAN 5 MG: 5 TABLET, FILM COATED ORAL at 20:18

## 2024-03-09 RX ADMIN — INSULIN DETEMIR 40 UNITS: 100 INJECTION, SOLUTION SUBCUTANEOUS at 20:19

## 2024-03-09 RX ADMIN — IBUPROFEN 400 MG: 400 TABLET ORAL at 03:59

## 2024-03-09 RX ADMIN — Medication 500 MG: at 20:18

## 2024-03-09 RX ADMIN — INSULIN DETEMIR 40 UNITS: 100 INJECTION, SOLUTION SUBCUTANEOUS at 09:20

## 2024-03-09 RX ADMIN — SIMETHICONE 80 MG: 80 TABLET, CHEWABLE ORAL at 13:07

## 2024-03-09 RX ADMIN — EMPAGLIFLOZIN 10 MG: 10 TABLET, FILM COATED ORAL at 09:21

## 2024-03-09 RX ADMIN — PREGABALIN 100 MG: 100 CAPSULE ORAL at 09:21

## 2024-03-09 RX ADMIN — Medication 500 MG: at 09:21

## 2024-03-09 RX ADMIN — APIXABAN 5 MG: 5 TABLET, FILM COATED ORAL at 09:21

## 2024-03-09 RX ADMIN — INSULIN LISPRO 4 UNITS: 100 INJECTION, SOLUTION INTRAVENOUS; SUBCUTANEOUS at 20:19

## 2024-03-09 RX ADMIN — FERROUS SULFATE TAB 325 MG (65 MG ELEMENTAL FE) 325 MG: 325 (65 FE) TAB at 09:00

## 2024-03-09 RX ADMIN — BACLOFEN 10 MG: 10 TABLET ORAL at 20:22

## 2024-03-09 RX ADMIN — CYANOCOBALAMIN TAB 500 MCG 1000 MCG: 500 TAB at 09:21

## 2024-03-09 RX ADMIN — IBUPROFEN 400 MG: 400 TABLET ORAL at 19:17

## 2024-03-09 RX ADMIN — ATORVASTATIN CALCIUM 10 MG: 10 TABLET, FILM COATED ORAL at 20:18

## 2024-03-09 RX ADMIN — BACLOFEN 10 MG: 10 TABLET ORAL at 02:18

## 2024-03-09 RX ADMIN — BACLOFEN 10 MG: 10 TABLET ORAL at 10:52

## 2024-03-09 RX ADMIN — OXYCODONE AND ACETAMINOPHEN 1 TABLET: 7.5; 325 TABLET ORAL at 07:38

## 2024-03-09 RX ADMIN — LISINOPRIL 2.5 MG: 2.5 TABLET ORAL at 09:21

## 2024-03-09 RX ADMIN — ACETAMINOPHEN 650 MG: 325 TABLET ORAL at 05:39

## 2024-03-09 RX ADMIN — BACITRACIN 0.9 G: 500 OINTMENT TOPICAL at 09:21

## 2024-03-09 NOTE — PLAN OF CARE
Goal Outcome Evaluation:           Progress: no change  Outcome Evaluation: Patient is alert and oriented x4, able to make needs known to staff.  Has been medicated x1 this shift with prn oxycodone, baclofen, and motrin.  See emar.  Rsd. states pain is constantly at a 10 on numerical scale, but appears to be a 0 on faces scale at times due to patient laughing and joking around with staff, talking on cellphone and playing games.  Rsd. Has been incontinent of bowels this shift. Urinal remains at bedside.  Activity encouraged this shift, Rsd. has attempted to pull self up in bed with overhead trapeze bar.  Has not ambulated or gotten out of bed this shift, Refuses to be turned, but does shift weight frequently while in bed.  Refuses bed alert.  Call light and personal items within reach, Rsd. reminded to use call light for assistance, verbalizes understanding.  WIll continue to monitor and notify on-coming staff.  Current plan of care remains in place at this time.

## 2024-03-09 NOTE — PLAN OF CARE
Goal Outcome Evaluation:  Plan of Care Reviewed With: patient        No major changed this shift. Con't to require pain meds for severe right hip pain. Meds effective for a time, but stated the pain never goes away completely. Voices needs. Con't current POC.

## 2024-03-10 PROCEDURE — 63710000001 INSULIN LISPRO (HUMAN) PER 5 UNITS: Performed by: INTERNAL MEDICINE

## 2024-03-10 PROCEDURE — 63710000001 INSULIN DETEMIR PER 5 UNITS: Performed by: PHYSICIAN ASSISTANT

## 2024-03-10 PROCEDURE — 82948 REAGENT STRIP/BLOOD GLUCOSE: CPT

## 2024-03-10 RX ADMIN — PREGABALIN 100 MG: 100 CAPSULE ORAL at 16:48

## 2024-03-10 RX ADMIN — INSULIN LISPRO 8 UNITS: 100 INJECTION, SOLUTION INTRAVENOUS; SUBCUTANEOUS at 20:08

## 2024-03-10 RX ADMIN — Medication 10 MG: at 20:08

## 2024-03-10 RX ADMIN — OXYCODONE AND ACETAMINOPHEN 1 TABLET: 7.5; 325 TABLET ORAL at 13:05

## 2024-03-10 RX ADMIN — PREGABALIN 100 MG: 100 CAPSULE ORAL at 08:33

## 2024-03-10 RX ADMIN — APIXABAN 5 MG: 5 TABLET, FILM COATED ORAL at 08:32

## 2024-03-10 RX ADMIN — BACITRACIN 0.9 G: 500 OINTMENT TOPICAL at 08:32

## 2024-03-10 RX ADMIN — PREGABALIN 100 MG: 100 CAPSULE ORAL at 20:08

## 2024-03-10 RX ADMIN — Medication 500 MG: at 20:08

## 2024-03-10 RX ADMIN — FERROUS SULFATE TAB 325 MG (65 MG ELEMENTAL FE) 325 MG: 325 (65 FE) TAB at 08:33

## 2024-03-10 RX ADMIN — OXYCODONE AND ACETAMINOPHEN 1 TABLET: 7.5; 325 TABLET ORAL at 20:08

## 2024-03-10 RX ADMIN — INSULIN DETEMIR 40 UNITS: 100 INJECTION, SOLUTION SUBCUTANEOUS at 08:33

## 2024-03-10 RX ADMIN — OXYCODONE AND ACETAMINOPHEN 1 TABLET: 7.5; 325 TABLET ORAL at 03:58

## 2024-03-10 RX ADMIN — CYANOCOBALAMIN TAB 500 MCG 1000 MCG: 500 TAB at 08:32

## 2024-03-10 RX ADMIN — BACLOFEN 10 MG: 10 TABLET ORAL at 16:47

## 2024-03-10 RX ADMIN — EMPAGLIFLOZIN 10 MG: 10 TABLET, FILM COATED ORAL at 08:32

## 2024-03-10 RX ADMIN — ATORVASTATIN CALCIUM 10 MG: 10 TABLET, FILM COATED ORAL at 20:08

## 2024-03-10 RX ADMIN — BACLOFEN 10 MG: 10 TABLET ORAL at 08:37

## 2024-03-10 RX ADMIN — SERTRALINE HYDROCHLORIDE 50 MG: 50 TABLET ORAL at 20:10

## 2024-03-10 RX ADMIN — LISINOPRIL 2.5 MG: 2.5 TABLET ORAL at 08:32

## 2024-03-10 RX ADMIN — INSULIN DETEMIR 40 UNITS: 100 INJECTION, SOLUTION SUBCUTANEOUS at 20:08

## 2024-03-10 RX ADMIN — Medication 500 MG: at 08:33

## 2024-03-10 NOTE — PLAN OF CARE
Goal Outcome Evaluation:        VSS. Rested throughout shift with complaints of pain and discomfort. Given PRN medication as ordered. No acute issues at this time. Continue plan of care.

## 2024-03-11 ENCOUNTER — APPOINTMENT (OUTPATIENT)
Dept: INTERVENTIONAL RADIOLOGY/VASCULAR | Facility: HOSPITAL | Age: 64
End: 2024-03-11
Payer: MEDICARE

## 2024-03-11 VITALS
SYSTOLIC BLOOD PRESSURE: 126 MMHG | WEIGHT: 315 LBS | TEMPERATURE: 97.9 F | HEART RATE: 79 BPM | OXYGEN SATURATION: 98 % | HEIGHT: 74 IN | BODY MASS INDEX: 40.43 KG/M2 | DIASTOLIC BLOOD PRESSURE: 66 MMHG | RESPIRATION RATE: 18 BRPM

## 2024-03-11 LAB
ANION GAP SERPL CALCULATED.3IONS-SCNC: 7.9 MMOL/L (ref 5–15)
BASOPHILS # BLD AUTO: 0.04 10*3/MM3 (ref 0–0.2)
BASOPHILS NFR BLD AUTO: 1.1 % (ref 0–1.5)
BUN SERPL-MCNC: 16 MG/DL (ref 8–23)
BUN/CREAT SERPL: 28.6 (ref 7–25)
CALCIUM SPEC-SCNC: 8.2 MG/DL (ref 8.6–10.5)
CHLORIDE SERPL-SCNC: 105 MMOL/L (ref 98–107)
CO2 SERPL-SCNC: 24.1 MMOL/L (ref 22–29)
CREAT SERPL-MCNC: 0.56 MG/DL (ref 0.76–1.27)
DEPRECATED RDW RBC AUTO: 50.9 FL (ref 37–54)
EGFRCR SERPLBLD CKD-EPI 2021: 109.4 ML/MIN/1.73
EOSINOPHIL # BLD AUTO: 0.13 10*3/MM3 (ref 0–0.4)
EOSINOPHIL NFR BLD AUTO: 3.6 % (ref 0.3–6.2)
ERYTHROCYTE [DISTWIDTH] IN BLOOD BY AUTOMATED COUNT: 15.7 % (ref 12.3–15.4)
GLUCOSE BLDC GLUCOMTR-MCNC: 111 MG/DL (ref 70–99)
GLUCOSE BLDC GLUCOMTR-MCNC: 124 MG/DL (ref 70–99)
GLUCOSE BLDC GLUCOMTR-MCNC: 137 MG/DL (ref 70–99)
GLUCOSE BLDC GLUCOMTR-MCNC: 139 MG/DL (ref 70–99)
GLUCOSE BLDC GLUCOMTR-MCNC: 142 MG/DL (ref 70–99)
GLUCOSE BLDC GLUCOMTR-MCNC: 160 MG/DL (ref 70–99)
GLUCOSE BLDC GLUCOMTR-MCNC: 172 MG/DL (ref 70–99)
GLUCOSE BLDC GLUCOMTR-MCNC: 227 MG/DL (ref 70–99)
GLUCOSE BLDC GLUCOMTR-MCNC: 97 MG/DL (ref 70–99)
GLUCOSE BLDC GLUCOMTR-MCNC: 99 MG/DL (ref 70–99)
GLUCOSE BLDC GLUCOMTR-MCNC: 99 MG/DL (ref 70–99)
GLUCOSE SERPL-MCNC: 142 MG/DL (ref 65–99)
HCT VFR BLD AUTO: 37.1 % (ref 37.5–51)
HGB BLD-MCNC: 11.7 G/DL (ref 13–17.7)
IMM GRANULOCYTES # BLD AUTO: 0.01 10*3/MM3 (ref 0–0.05)
IMM GRANULOCYTES NFR BLD AUTO: 0.3 % (ref 0–0.5)
LYMPHOCYTES # BLD AUTO: 0.99 10*3/MM3 (ref 0.7–3.1)
LYMPHOCYTES NFR BLD AUTO: 27.3 % (ref 19.6–45.3)
MCH RBC QN AUTO: 27.8 PG (ref 26.6–33)
MCHC RBC AUTO-ENTMCNC: 31.5 G/DL (ref 31.5–35.7)
MCV RBC AUTO: 88.1 FL (ref 79–97)
MONOCYTES # BLD AUTO: 0.49 10*3/MM3 (ref 0.1–0.9)
MONOCYTES NFR BLD AUTO: 13.5 % (ref 5–12)
NEUTROPHILS NFR BLD AUTO: 1.96 10*3/MM3 (ref 1.7–7)
NEUTROPHILS NFR BLD AUTO: 54.2 % (ref 42.7–76)
NRBC BLD AUTO-RTO: 0 /100 WBC (ref 0–0.2)
PLATELET # BLD AUTO: 102 10*3/MM3 (ref 140–450)
PMV BLD AUTO: 11.1 FL (ref 6–12)
POTASSIUM SERPL-SCNC: 4.3 MMOL/L (ref 3.5–5.2)
RBC # BLD AUTO: 4.21 10*6/MM3 (ref 4.14–5.8)
SARS-COV-2 RNA RESP QL NAA+PROBE: NOT DETECTED
SODIUM SERPL-SCNC: 137 MMOL/L (ref 136–145)
WBC NRBC COR # BLD AUTO: 3.62 10*3/MM3 (ref 3.4–10.8)

## 2024-03-11 PROCEDURE — 63710000001 INSULIN LISPRO (HUMAN) PER 5 UNITS: Performed by: INTERNAL MEDICINE

## 2024-03-11 PROCEDURE — 87635 SARS-COV-2 COVID-19 AMP PRB: CPT | Performed by: PHYSICIAN ASSISTANT

## 2024-03-11 PROCEDURE — 80048 BASIC METABOLIC PNL TOTAL CA: CPT | Performed by: PHYSICIAN ASSISTANT

## 2024-03-11 PROCEDURE — 63710000001 INSULIN DETEMIR PER 5 UNITS: Performed by: PHYSICIAN ASSISTANT

## 2024-03-11 PROCEDURE — 82948 REAGENT STRIP/BLOOD GLUCOSE: CPT

## 2024-03-11 PROCEDURE — 77002 NEEDLE LOCALIZATION BY XRAY: CPT

## 2024-03-11 PROCEDURE — 25510000001 IOPAMIDOL 61 % SOLUTION: Performed by: INTERNAL MEDICINE

## 2024-03-11 PROCEDURE — 85025 COMPLETE CBC W/AUTO DIFF WBC: CPT | Performed by: PHYSICIAN ASSISTANT

## 2024-03-11 RX ORDER — BUPIVACAINE HYDROCHLORIDE 5 MG/ML
10 INJECTION, SOLUTION EPIDURAL; INTRACAUDAL ONCE
Status: ACTIVE | OUTPATIENT
Start: 2024-03-11

## 2024-03-11 RX ORDER — METHYLPREDNISOLONE ACETATE 80 MG/ML
80 INJECTION, SUSPENSION INTRA-ARTICULAR; INTRALESIONAL; INTRAMUSCULAR; SOFT TISSUE ONCE
Status: DISCONTINUED | OUTPATIENT
Start: 2024-03-11 | End: 2024-03-18

## 2024-03-11 RX ORDER — IOPAMIDOL 612 MG/ML
15 INJECTION, SOLUTION INTRATHECAL
Status: COMPLETED | OUTPATIENT
Start: 2024-03-11 | End: 2024-03-11

## 2024-03-11 RX ORDER — LIDOCAINE HYDROCHLORIDE 20 MG/ML
20 INJECTION, SOLUTION INFILTRATION; PERINEURAL ONCE
Status: COMPLETED | OUTPATIENT
Start: 2024-03-11 | End: 2024-03-11

## 2024-03-11 RX ADMIN — PREGABALIN 100 MG: 100 CAPSULE ORAL at 10:36

## 2024-03-11 RX ADMIN — Medication 500 MG: at 21:10

## 2024-03-11 RX ADMIN — PREGABALIN 100 MG: 100 CAPSULE ORAL at 21:04

## 2024-03-11 RX ADMIN — SODIUM BICARBONATE 1 MEQ: 84 INJECTION, SOLUTION INTRAVENOUS at 09:40

## 2024-03-11 RX ADMIN — BACITRACIN 0.9 G: 500 OINTMENT TOPICAL at 10:36

## 2024-03-11 RX ADMIN — INSULIN LISPRO 4 UNITS: 100 INJECTION, SOLUTION INTRAVENOUS; SUBCUTANEOUS at 21:03

## 2024-03-11 RX ADMIN — CYANOCOBALAMIN TAB 500 MCG 1000 MCG: 500 TAB at 10:36

## 2024-03-11 RX ADMIN — OXYCODONE AND ACETAMINOPHEN 1 TABLET: 7.5; 325 TABLET ORAL at 19:23

## 2024-03-11 RX ADMIN — LIDOCAINE HYDROCHLORIDE 20 ML: 20 INJECTION, SOLUTION INFILTRATION; PERINEURAL at 09:40

## 2024-03-11 RX ADMIN — Medication 10 MG: at 21:04

## 2024-03-11 RX ADMIN — INSULIN DETEMIR 40 UNITS: 100 INJECTION, SOLUTION SUBCUTANEOUS at 21:03

## 2024-03-11 RX ADMIN — Medication 500 MG: at 10:36

## 2024-03-11 RX ADMIN — INSULIN DETEMIR 40 UNITS: 100 INJECTION, SOLUTION SUBCUTANEOUS at 10:35

## 2024-03-11 RX ADMIN — OXYCODONE AND ACETAMINOPHEN 1 TABLET: 7.5; 325 TABLET ORAL at 10:47

## 2024-03-11 RX ADMIN — EMPAGLIFLOZIN 10 MG: 10 TABLET, FILM COATED ORAL at 10:36

## 2024-03-11 RX ADMIN — LISINOPRIL 2.5 MG: 2.5 TABLET ORAL at 10:36

## 2024-03-11 RX ADMIN — SERTRALINE HYDROCHLORIDE 50 MG: 50 TABLET ORAL at 21:04

## 2024-03-11 RX ADMIN — INSULIN LISPRO 4 UNITS: 100 INJECTION, SOLUTION INTRAVENOUS; SUBCUTANEOUS at 17:38

## 2024-03-11 RX ADMIN — PREGABALIN 100 MG: 100 CAPSULE ORAL at 16:36

## 2024-03-11 RX ADMIN — BACLOFEN 10 MG: 10 TABLET ORAL at 08:51

## 2024-03-11 RX ADMIN — ATORVASTATIN CALCIUM 10 MG: 10 TABLET, FILM COATED ORAL at 21:04

## 2024-03-11 RX ADMIN — BACLOFEN 10 MG: 10 TABLET ORAL at 01:50

## 2024-03-11 RX ADMIN — IBUPROFEN 400 MG: 400 TABLET ORAL at 00:44

## 2024-03-11 RX ADMIN — FERROUS SULFATE TAB 325 MG (65 MG ELEMENTAL FE) 325 MG: 325 (65 FE) TAB at 07:52

## 2024-03-11 RX ADMIN — BACLOFEN 10 MG: 10 TABLET ORAL at 19:23

## 2024-03-11 RX ADMIN — IOPAMIDOL 15 ML: 612 INJECTION, SOLUTION INTRATHECAL at 09:48

## 2024-03-11 RX ADMIN — INSULIN LISPRO 4 UNITS: 100 INJECTION, SOLUTION INTRAVENOUS; SUBCUTANEOUS at 07:52

## 2024-03-11 NOTE — PLAN OF CARE
Goal Outcome Evaluation:   Alert and oriented and pleasant with staff. X2max assist for transfers and ambulation. X2 admin PRN pain medication, with relief of symptoms noted. CT guided injection to hip joint, unsuccessful this shift. Pt states he does not want another attempt. No other significant change noted. Sitting up in bed, call light in reach.

## 2024-03-11 NOTE — PLAN OF CARE
Goal Outcome Evaluation:           Progress: no change    Outcome Evaluation: Patient is alert and oriented x4, able to make needs known to staff.  Has been medicated x1 this shift with prn oxycodone. baclofen, and motrin. see emar.  Rsd. states medication effective.  Bedpan utilized this shift, can be incontinent of bowels.  Urinal remains at bedside.  Activity encouraged this shift, Rsd. has attempted to pull self up in bed with overhead trapeze bar.  Has not ambulated or gotten out of bed this shift, Refuses to be turned, but does shift weight frequently while in bed.  Refuses bed alert.  Bowels formed this shift.  Call light and personal items within reach, Rsd. reminded to use call light for assistance, verbalizes understanding.  WIll continue to monitor and notify on-coming staff.  Current plan of care remains in place at this time.

## 2024-03-12 LAB
GLUCOSE BLDC GLUCOMTR-MCNC: 128 MG/DL (ref 70–99)
GLUCOSE BLDC GLUCOMTR-MCNC: 130 MG/DL (ref 70–99)
GLUCOSE BLDC GLUCOMTR-MCNC: 169 MG/DL (ref 70–99)
GLUCOSE BLDC GLUCOMTR-MCNC: 179 MG/DL (ref 70–99)

## 2024-03-12 PROCEDURE — 82948 REAGENT STRIP/BLOOD GLUCOSE: CPT

## 2024-03-12 PROCEDURE — 63710000001 INSULIN DETEMIR PER 5 UNITS: Performed by: PHYSICIAN ASSISTANT

## 2024-03-12 RX ORDER — MINERAL OIL/HYDROPHIL PETROLAT
1 OINTMENT (GRAM) TOPICAL
Status: DISPENSED | OUTPATIENT
Start: 2024-03-12

## 2024-03-12 RX ADMIN — PREGABALIN 100 MG: 100 CAPSULE ORAL at 21:57

## 2024-03-12 RX ADMIN — PREGABALIN 100 MG: 100 CAPSULE ORAL at 08:43

## 2024-03-12 RX ADMIN — INSULIN DETEMIR 40 UNITS: 100 INJECTION, SOLUTION SUBCUTANEOUS at 21:57

## 2024-03-12 RX ADMIN — BACLOFEN 10 MG: 10 TABLET ORAL at 05:22

## 2024-03-12 RX ADMIN — Medication 10 MG: at 21:57

## 2024-03-12 RX ADMIN — ATORVASTATIN CALCIUM 10 MG: 10 TABLET, FILM COATED ORAL at 21:57

## 2024-03-12 RX ADMIN — WHITE PETROLATUM 1 APPLICATION: 1.75 OINTMENT TOPICAL at 21:57

## 2024-03-12 RX ADMIN — SERTRALINE HYDROCHLORIDE 50 MG: 50 TABLET ORAL at 21:58

## 2024-03-12 RX ADMIN — ACETAMINOPHEN 650 MG: 325 TABLET ORAL at 07:22

## 2024-03-12 RX ADMIN — Medication 500 MG: at 08:44

## 2024-03-12 RX ADMIN — BACITRACIN 0.9 G: 500 OINTMENT TOPICAL at 08:44

## 2024-03-12 RX ADMIN — INSULIN DETEMIR 40 UNITS: 100 INJECTION, SOLUTION SUBCUTANEOUS at 08:43

## 2024-03-12 RX ADMIN — LISINOPRIL 2.5 MG: 2.5 TABLET ORAL at 08:43

## 2024-03-12 RX ADMIN — PREGABALIN 100 MG: 100 CAPSULE ORAL at 15:29

## 2024-03-12 RX ADMIN — OXYCODONE AND ACETAMINOPHEN 1 TABLET: 7.5; 325 TABLET ORAL at 08:43

## 2024-03-12 RX ADMIN — IBUPROFEN 400 MG: 400 TABLET ORAL at 02:29

## 2024-03-12 RX ADMIN — CYANOCOBALAMIN TAB 500 MCG 1000 MCG: 500 TAB at 08:44

## 2024-03-12 RX ADMIN — EMPAGLIFLOZIN 10 MG: 10 TABLET, FILM COATED ORAL at 08:43

## 2024-03-12 RX ADMIN — OXYCODONE AND ACETAMINOPHEN 1 TABLET: 7.5; 325 TABLET ORAL at 15:29

## 2024-03-12 RX ADMIN — BACLOFEN 10 MG: 10 TABLET ORAL at 16:05

## 2024-03-12 RX ADMIN — OXYCODONE AND ACETAMINOPHEN 1 TABLET: 7.5; 325 TABLET ORAL at 02:29

## 2024-03-12 RX ADMIN — FERROUS SULFATE TAB 325 MG (65 MG ELEMENTAL FE) 325 MG: 325 (65 FE) TAB at 07:22

## 2024-03-12 RX ADMIN — Medication 500 MG: at 21:57

## 2024-03-12 NOTE — SIGNIFICANT NOTE
Wound Eval / Progress Noted    KEO Dominguez     Patient Name: Jose Shaikh  : 1960  MRN: [id number]  Today's Date: 3/12/2024                 Admit Date: 2023    Visit Dx:    ICD-10-CM ICD-9-CM   1. Difficulty in walking  R26.2 719.7   2. Type 2 diabetes mellitus with other specified complication, unspecified whether long term insulin use  E11.69 250.80         Debility    Ulcer of right foot    Ulcerated, foot, left, limited to breakdown of skin    Onychomycosis        Past Medical History:   Diagnosis Date    Absence of toe of right foot     Acute osteomyelitis of left calcaneus  2021    Anxiety and depression     Arthritis     Cancer     Chronic pain     STATES HIS PAIN IS 10/10 AAT    Claustrophobia     Corns and callus     Diabetic ulcer of left heel associated with type 2 DM 2021    Diabetic ulcer of left heel associated with type 2 DM 2021    Diabetic ulcer of right midfoot associated with type 2 DM 2021    Difficulty walking     Essential hypertension 2021    Hammertoe     Hyperlipidemia LDL goal <100 2021    Ingrown toenail     Obesity     Paroxysmal atrial fibrillation 2021    Polyneuropathy     Pressure ulcer, stage 1     Tinea unguium     Type 2 diabetes mellitus with polyneuropathy         Past Surgical History:   Procedure Laterality Date    CYST REMOVAL      center of back; benign    EYE SURGERY      INCISION AND DRAINAGE ABSCESS      back    INCISION AND DRAINAGE LEG Right 12/10/2021    Procedure: INCISION AND DRAINAGE LOWER EXTREMITY;  Surgeon: Ash Leyva DPM;  Location: [facility] MAIN OR;  Service: Podiatry;  Laterality: Right;    OTHER SURGICAL HISTORY      Surgical clips left foot    TOE SURGERY Right     Removal of 5th toe    TRANS METATARSAL AMPUTATION Right 2021    Procedure: AMPUTATION TRANS METATARSAL;  Surgeon: Ash Leyva DPM;  Location: [facility] MAIN OR;  Service: Podiatry;  Laterality: Right;    VASCULAR  SURGERY      WRIST SURGERY Left     repair of injury         Physical Assessment:     03/12/24 0935   Wound 01/22/24 1343 Left anterior fourth toe Abrasion   Placement Date/Time: 01/22/24 1343   Present on Original Admission: No  Side: Left  Orientation: anterior  Location: fourth toe  Primary Wound Type: Abrasion   Wound Image    Dressing Appearance intact;no drainage   Closure None   Base purple;dry   Periwound intact;pink   Periwound Temperature warm   Periwound Skin Turgor soft   Edges rolled/closed   Wound Length (cm) 0.3 cm   Wound Width (cm) 0.4 cm   Wound Surface Area (cm^2) 0.12 cm^2   Drainage Amount none   Care, Wound cleansed with;sterile normal saline   Dressing Care open to air          Wound Check / Follow-up:  Patient seen today for wound follow-up. Patient is lying in bed awake, alert and oriented. He is agreeable to assessment but states that I cannot assess the posterior aspect of his body because he is on the bedpan.     Patient has a wound to his left anterior fourth toe. Dressing removed. No drainage noted. Cleansed with NS and gauze. Thick crusting removed with cleansing. Only very small surface level dark discolored area remaining. Cleansed with NS and gauze. Blotted dry. Recommending to discontinue dressings and to include in skin care / hygiene.     Skin folds remains moist with minimal redness. No signs of fungal presentation at this time.     Dry skin to BLE / feet noted. Recommending skin care / hygiene.     Discussed with patient the need to not stay on bed pan for long periods of time. He states he is not getting off of it until he is done. Discussed with Primary RN. If any breakdown is noted to posterior aspect, wound care to be notified.     Impression: Wound to left anterior fourth toe. Dry flaky skin, Moist skin folds    Short term goals:  Regain skin integrity. Skin care / hygiene. Moisture prevention, pressure reduciton.     Bettye Crews RN    3/12/2024    15:38 EDT

## 2024-03-12 NOTE — PLAN OF CARE
Problem: Skin Injury Risk Increased  Goal: Skin Health and Integrity  Outcome: Ongoing, Progressing  Intervention: Optimize Skin Protection  Recent Flowsheet Documentation  Taken 3/12/2024 1529 by Mae Francisco RN  Head of Bed (HOB) Positioning: HOB elevated  Taken 3/12/2024 1400 by Mae Francisco RN  Head of Bed (HOB) Positioning: HOB elevated  Taken 3/12/2024 1030 by Mae Francisco RN  Head of Bed (HOB) Positioning: HOB elevated     Problem: Fall Injury Risk  Goal: Absence of Fall and Fall-Related Injury  Outcome: Ongoing, Progressing  Intervention: Identify and Manage Contributors  Recent Flowsheet Documentation  Taken 3/12/2024 1200 by Mae Francisco RN  Medication Review/Management:   medications reviewed   high-risk medications identified  Intervention: Promote Injury-Free Environment  Recent Flowsheet Documentation  Taken 3/12/2024 1529 by Mae Francisco RN  Safety Promotion/Fall Prevention:   nonskid shoes/slippers when out of bed   safety round/check completed  Taken 3/12/2024 1400 by Mae Francisco RN  Safety Promotion/Fall Prevention:   nonskid shoes/slippers when out of bed   safety round/check completed  Taken 3/12/2024 1200 by Mae Francisco RN  Safety Promotion/Fall Prevention:   assistive device/personal items within reach   clutter free environment maintained   fall prevention program maintained   lighting adjusted   nonskid shoes/slippers when out of bed   room organization consistent   safety round/check completed  Taken 3/12/2024 1030 by Mae Francisco RN  Safety Promotion/Fall Prevention:   nonskid shoes/slippers when out of bed   safety round/check completed     Problem: Pain Chronic (Persistent)  Goal: Acceptable Pain Control and Functional Ability  Outcome: Ongoing, Progressing  Intervention: Manage Persistent Pain  Recent Flowsheet Documentation  Taken 3/12/2024 1200 by Mae Francisco RN  Medication Review/Management:   medications reviewed   high-risk medications  identified  Intervention: Develop Pain Management Plan  Recent Flowsheet Documentation  Taken 3/12/2024 1529 by Mae Francisco, RN  Pain Management Interventions:   around-the-clock dosing utilized   care clustered   pain management plan reviewed with patient/caregiver   quiet environment facilitated   see MAR   Goal Outcome Evaluation:  Patient is alert and oriented x4. Patient has had complaints of pain, see EMAR.

## 2024-03-12 NOTE — PLAN OF CARE
Goal Outcome Evaluation:Pt. AAOX4 personal belongings and call light within reach. , gave 4units SSI. Gave PRN meds: Percocet at 1923 and 0229, Baclofen at 1923 and 0522, Ibuprofen 0229. Per pt pain is always a ten when med requested stated some easing of pain with meds.

## 2024-03-13 LAB
GLUCOSE BLDC GLUCOMTR-MCNC: 106 MG/DL (ref 70–99)
GLUCOSE BLDC GLUCOMTR-MCNC: 112 MG/DL (ref 70–99)
GLUCOSE BLDC GLUCOMTR-MCNC: 116 MG/DL (ref 70–99)
GLUCOSE BLDC GLUCOMTR-MCNC: 125 MG/DL (ref 70–99)
GLUCOSE BLDC GLUCOMTR-MCNC: 168 MG/DL (ref 70–99)
GLUCOSE BLDC GLUCOMTR-MCNC: 206 MG/DL (ref 70–99)

## 2024-03-13 PROCEDURE — 63710000001 INSULIN DETEMIR PER 5 UNITS: Performed by: PHYSICIAN ASSISTANT

## 2024-03-13 PROCEDURE — 99221 1ST HOSP IP/OBS SF/LOW 40: CPT | Performed by: ORTHOPAEDIC SURGERY

## 2024-03-13 PROCEDURE — 63710000001 INSULIN LISPRO (HUMAN) PER 5 UNITS: Performed by: INTERNAL MEDICINE

## 2024-03-13 PROCEDURE — 82948 REAGENT STRIP/BLOOD GLUCOSE: CPT

## 2024-03-13 RX ORDER — BACLOFEN 10 MG/1
10 TABLET ORAL 3 TIMES DAILY PRN
Status: DISCONTINUED | OUTPATIENT
Start: 2024-03-13 | End: 2024-03-18

## 2024-03-13 RX ADMIN — OXYCODONE AND ACETAMINOPHEN 1 TABLET: 7.5; 325 TABLET ORAL at 22:40

## 2024-03-13 RX ADMIN — LISINOPRIL 2.5 MG: 2.5 TABLET ORAL at 09:29

## 2024-03-13 RX ADMIN — INSULIN DETEMIR 40 UNITS: 100 INJECTION, SOLUTION SUBCUTANEOUS at 09:30

## 2024-03-13 RX ADMIN — PREGABALIN 100 MG: 100 CAPSULE ORAL at 21:03

## 2024-03-13 RX ADMIN — EMPAGLIFLOZIN 10 MG: 10 TABLET, FILM COATED ORAL at 09:30

## 2024-03-13 RX ADMIN — Medication: at 21:04

## 2024-03-13 RX ADMIN — BACLOFEN 10 MG: 10 TABLET ORAL at 12:02

## 2024-03-13 RX ADMIN — OXYCODONE AND ACETAMINOPHEN 1 TABLET: 7.5; 325 TABLET ORAL at 16:42

## 2024-03-13 RX ADMIN — PREGABALIN 100 MG: 100 CAPSULE ORAL at 09:29

## 2024-03-13 RX ADMIN — ACETAMINOPHEN 650 MG: 325 TABLET ORAL at 07:53

## 2024-03-13 RX ADMIN — BACLOFEN 10 MG: 10 TABLET ORAL at 21:09

## 2024-03-13 RX ADMIN — ATORVASTATIN CALCIUM 10 MG: 10 TABLET, FILM COATED ORAL at 21:03

## 2024-03-13 RX ADMIN — Medication 500 MG: at 09:29

## 2024-03-13 RX ADMIN — PREGABALIN 100 MG: 100 CAPSULE ORAL at 16:42

## 2024-03-13 RX ADMIN — SERTRALINE HYDROCHLORIDE 50 MG: 50 TABLET ORAL at 21:09

## 2024-03-13 RX ADMIN — INSULIN DETEMIR 40 UNITS: 100 INJECTION, SOLUTION SUBCUTANEOUS at 21:03

## 2024-03-13 RX ADMIN — FERROUS SULFATE TAB 325 MG (65 MG ELEMENTAL FE) 325 MG: 325 (65 FE) TAB at 07:53

## 2024-03-13 RX ADMIN — WHITE PETROLATUM 1 APPLICATION: 1.75 OINTMENT TOPICAL at 09:30

## 2024-03-13 RX ADMIN — OXYCODONE AND ACETAMINOPHEN 1 TABLET: 7.5; 325 TABLET ORAL at 09:29

## 2024-03-13 RX ADMIN — INSULIN LISPRO 4 UNITS: 100 INJECTION, SOLUTION INTRAVENOUS; SUBCUTANEOUS at 12:02

## 2024-03-13 RX ADMIN — Medication 10 MG: at 21:02

## 2024-03-13 RX ADMIN — IBUPROFEN 400 MG: 400 TABLET ORAL at 22:01

## 2024-03-13 RX ADMIN — CYANOCOBALAMIN TAB 500 MCG 1000 MCG: 500 TAB at 09:30

## 2024-03-13 RX ADMIN — Medication 500 MG: at 21:02

## 2024-03-13 RX ADMIN — OXYCODONE AND ACETAMINOPHEN 1 TABLET: 7.5; 325 TABLET ORAL at 00:45

## 2024-03-13 RX ADMIN — BACLOFEN 10 MG: 10 TABLET ORAL at 00:43

## 2024-03-13 RX ADMIN — WHITE PETROLATUM 1 APPLICATION: 1.75 OINTMENT TOPICAL at 22:37

## 2024-03-13 NOTE — CONSULTS
[facility]   Consult Note    Patient Name: Jose Shaikh  : 1960  MRN: [id number]  Primary Care Physician:  Delia Cervantes APRN  Referring Physician: Max Mehta MD  Date of admission: 2023    Subjective   Subjective     Reason for Consult/ Chief Complaint: Left hip pain    HPI:  Jose Shaikh is a 63 y.o. male who has chronic and persistent left hip pain.  The patient has been seen by Dr. Parkinson in the past for consultation of left hip osteoarthritis.  The recommendation was made for weight loss prior to any hip replacement surgery conversation.  He was instructed at that time that he needs to lose a least 50 pounds prior to surgery.  He has been hospitalized in the nursing facility for several weeks and has been taking Ozempic.  Per his report he has achieved a weight loss of around 40 pounds.  However he continues to report severe hip and knee pain.  He has had previous knee injections in the past with minimal to no relief.  They attempted a left hip fluoroscopic injection without success.  He reports that the baclofen he was taking was really helping, however this has recently been discontinued.    Review of Systems   All systems were reviewed and negative except for those mentioned in HPI    Personal History     Past Medical History:   Diagnosis Date    Absence of toe of right foot     Acute osteomyelitis of left calcaneus  2021    Anxiety and depression     Arthritis     Cancer     Chronic pain     STATES HIS PAIN IS 10/10 AAT    Claustrophobia     Corns and callus     Diabetic ulcer of left heel associated with type 2 DM 2021    Diabetic ulcer of left heel associated with type 2 DM 2021    Diabetic ulcer of right midfoot associated with type 2 DM 2021    Difficulty walking     Essential hypertension 2021    Hammertoe     Hyperlipidemia LDL goal <100 2021    Ingrown toenail     Obesity     Paroxysmal atrial fibrillation 2021     Polyneuropathy     Pressure ulcer, stage 1     Tinea unguium     Type 2 diabetes mellitus with polyneuropathy        Past Surgical History:   Procedure Laterality Date    CYST REMOVAL      center of back; benign    EYE SURGERY      INCISION AND DRAINAGE ABSCESS      back    INCISION AND DRAINAGE LEG Right 12/10/2021    Procedure: INCISION AND DRAINAGE LOWER EXTREMITY;  Surgeon: Ash Leyva DPM;  Location: [facility] MAIN OR;  Service: Podiatry;  Laterality: Right;    OTHER SURGICAL HISTORY      Surgical clips left foot    TOE SURGERY Right     Removal of 5th toe    TRANS METATARSAL AMPUTATION Right 12/02/2021    Procedure: AMPUTATION TRANS METATARSAL;  Surgeon: Ash Leyva DPM;  Location: [facility] MAIN OR;  Service: Podiatry;  Laterality: Right;    VASCULAR SURGERY      WRIST SURGERY Left     repair of injury       Family History: family history includes Alcohol abuse in his nephew; Arthritis in his mother; COPD in his nephew; Cancer in his father and sister; Depression in his mother; Diabetes in his paternal grandmother; Drug abuse in his nephew; Hearing loss in his brother and mother; Heart disease in his brother, father, and mother; Learning disabilities in his nephew. Otherwise pertinent FHx was reviewed and not pertinent to current issue.    Social History:  reports that he quit smoking about 33 years ago. His smoking use included cigarettes. He has never used smokeless tobacco. He reports current alcohol use. He reports current drug use. Drug: Marijuana.    Home Medications:  HYDROcodone-acetaminophen, Insulin Lispro (1 Unit Dial), Semaglutide(0.25 or 0.5MG/DOS), acetaminophen, apixaban, digoxin, insulin detemir, lisinopril, naloxone, pregabalin, and simvastatin    Allergies:  Allergies   Allergen Reactions    Adhesive Tape Rash       Objective    Objective     Vitals:   Temp:  [97.7 °F (36.5 °C)-97.9 °F (36.6 °C)] 97.7 °F (36.5 °C)  Heart Rate:  [76-77] 77  Resp:  [18] 18  BP:  (101-128)/(61-67) 128/67    Physical Exam:   Constitutional: Awake, alert   HENT: NCAT   Neck: Supple   Respiratory: Unlabored breathing, good respiratory effort   Cardiovascular: Regular heart rate   Gastrointestinal: Nontender and nondistended   Musculoskeletal: Decreased range of motion of the left hip.  Large pannus with abundant soft tissue over the anterior hip.  No obvious cellulitis.  Skin is intact.  Positive pulses.  Pain with hip range of motion pain with knee range of motion.  Reduced knee range of motion.  Previous midfoot amputation to the right lower extremity.   Psychiatric: Appropriate affect, cooperative   Neurologic: Grossly intact   Skin: No rashes     Result Review    Result Review:  I have personally reviewed the results from the time of this admission to 3/13/2024 09:12 EDT and agree with these findings:  [x]  Laboratory  []  Microbiology  [x]  Radiology  []  EKG/Telemetry   []  Cardiology/Vascular   []  Pathology  []  Old records  []  Other:    Most notable findings include: X-rays: Advanced left hip osteoarthritis    Assessment & Plan   Assessment / Plan     Brief Patient Summary:  Jose Shaikh is a 63 y.o. male who has left hip osteoarthritis    Active Hospital Problems:  Active Hospital Problems    Diagnosis     **Debility     Ulcerated, foot, left, limited to breakdown of skin     Onychomycosis     Ulcer of right foot        Plan: I discussed treatment options with the patient.  In my opinion the patient is still too morbidly obese to safely consider hip replacement at this time.  His BMI is still 44.  Recommend BMI 40 or below before any surgery is considered.  Likely will need to achieve additional weight loss, which has been achieved by taking Ozempic.  Plan to resume baclofen if okay per hospitalist.  Continue range of motion and activity as tolerated.  Likely can reassess for surgery in approximately 4 to 6 weeks.  Thank you for the consultation.        Electronically signed by Jose  SANA Cox MD, 03/13/24, 9:12 AM EDT.

## 2024-03-13 NOTE — PLAN OF CARE
Goal Outcome Evaluation: VSS, AOX4,  FSBG level, 104.  Call light and personal belongings at bedside within reach. No changes noted

## 2024-03-14 PROCEDURE — 63710000001 INSULIN DETEMIR PER 5 UNITS: Performed by: PHYSICIAN ASSISTANT

## 2024-03-14 PROCEDURE — 63710000001 INSULIN LISPRO (HUMAN) PER 5 UNITS: Performed by: INTERNAL MEDICINE

## 2024-03-14 PROCEDURE — 82948 REAGENT STRIP/BLOOD GLUCOSE: CPT

## 2024-03-14 RX ADMIN — SERTRALINE HYDROCHLORIDE 50 MG: 50 TABLET ORAL at 22:37

## 2024-03-14 RX ADMIN — INSULIN DETEMIR 40 UNITS: 100 INJECTION, SOLUTION SUBCUTANEOUS at 08:25

## 2024-03-14 RX ADMIN — PREGABALIN 100 MG: 100 CAPSULE ORAL at 08:28

## 2024-03-14 RX ADMIN — PREGABALIN 100 MG: 100 CAPSULE ORAL at 22:37

## 2024-03-14 RX ADMIN — Medication 10 MG: at 22:36

## 2024-03-14 RX ADMIN — WHITE PETROLATUM 1 APPLICATION: 1.75 OINTMENT TOPICAL at 22:42

## 2024-03-14 RX ADMIN — FERROUS SULFATE TAB 325 MG (65 MG ELEMENTAL FE) 325 MG: 325 (65 FE) TAB at 08:28

## 2024-03-14 RX ADMIN — WHITE PETROLATUM 1 APPLICATION: 1.75 OINTMENT TOPICAL at 16:20

## 2024-03-14 RX ADMIN — INSULIN DETEMIR 40 UNITS: 100 INJECTION, SOLUTION SUBCUTANEOUS at 22:35

## 2024-03-14 RX ADMIN — Medication 500 MG: at 22:36

## 2024-03-14 RX ADMIN — PREGABALIN 100 MG: 100 CAPSULE ORAL at 16:56

## 2024-03-14 RX ADMIN — EMPAGLIFLOZIN 10 MG: 10 TABLET, FILM COATED ORAL at 08:28

## 2024-03-14 RX ADMIN — Medication 500 MG: at 08:27

## 2024-03-14 RX ADMIN — BACLOFEN 10 MG: 10 TABLET ORAL at 13:30

## 2024-03-14 RX ADMIN — OXYCODONE AND ACETAMINOPHEN 1 TABLET: 7.5; 325 TABLET ORAL at 08:27

## 2024-03-14 RX ADMIN — INSULIN LISPRO 4 UNITS: 100 INJECTION, SOLUTION INTRAVENOUS; SUBCUTANEOUS at 17:27

## 2024-03-14 RX ADMIN — CYANOCOBALAMIN TAB 500 MCG 1000 MCG: 500 TAB at 08:28

## 2024-03-14 RX ADMIN — ATORVASTATIN CALCIUM 10 MG: 10 TABLET, FILM COATED ORAL at 22:37

## 2024-03-14 RX ADMIN — LISINOPRIL 2.5 MG: 2.5 TABLET ORAL at 08:27

## 2024-03-14 RX ADMIN — BACLOFEN 10 MG: 10 TABLET ORAL at 04:57

## 2024-03-14 NOTE — PROGRESS NOTES
"Nursing Facility Medication Regimen Review    Potentially Clinically Significant Medication Issues during this review: []Identified   [x]Not identified    Provider acknowledgement required?   []Yes   [x]No    Jose Shaikh is a 63 y.o.male admitted by Jose Maya MD , on 11/6/2023  1:34 PM , for Debility [R53.81]    Visit Vitals  /60 (BP Location: Left arm, Patient Position: Lying)   Pulse 71   Temp 97.9 °F (36.6 °C) (Oral)   Resp 18   Ht 188 cm (74.02\")   Wt (!) 173 kg (382 lb 4.4 oz)   SpO2 92%   BMI 49.06 kg/m²        Lab Results   Component Value Date    GLUCOSE 142 (H) 03/11/2024    BUN 16 03/11/2024    CREATININE 0.56 (L) 03/11/2024    EGFRIFNONA 134 12/20/2021    BCR 28.6 (H) 03/11/2024    K 4.3 03/11/2024    CO2 24.1 03/11/2024    CALCIUM 8.2 (L) 03/11/2024    ALBUMIN 2.9 (L) 01/31/2024    LABIL2 1.3 (L) 08/07/2019    AST 52 (H) 01/31/2024    ALT 41 01/31/2024    WBC 3.62 03/11/2024    HGB 11.7 (L) 03/11/2024    HCT 37.1 (L) 03/11/2024    MCV 88.1 03/11/2024     (L) 03/11/2024        Active Ambulatory Problems     Diagnosis Date Noted    Diabetic ulcer of left heel associated with type 2 DM 07/06/2021    Acute osteomyelitis of left calcaneus  08/18/2021    Diabetic ulcer of left heel associated with type 2 DM 08/18/2021    Diabetic ulcer of right midfoot associated with type 2 DM 08/18/2021    Paroxysmal atrial fibrillation 08/31/2021    Essential hypertension 08/31/2021    Hyperlipidemia LDL goal <100 08/31/2021    Cellulitis and abscess of foot 12/01/2021    High alkaline phosphatase level 12/19/2021    Osteomyelitis 10/01/2022    Onychomycosis 10/02/2022    Onychocryptosis 10/02/2022    Foot pain, bilateral 10/02/2022    Osteomyelitis of foot, right, acute 10/05/2022    Cellulitis of right foot 10/05/2022    Type 2 diabetes mellitus, with long-term current use of insulin 11/08/2022    Class 3 severe obesity due to excess calories with serious comorbidity and body mass index (BMI) of 45.0 " to 49.9 in adult 11/08/2022    Anxiety disorder, unspecified 10/07/2022    Claustrophobia 05/02/2022    Dependence on wheelchair 10/27/2022    Depression, unspecified 05/02/2022    Long term (current) use of anticoagulants 05/02/2022    Long term (current) use of oral hypoglycemic drugs 05/02/2022    Wound of foot 05/02/2022    Ulcer of right foot 05/02/2022    Orthostatic hypotension 10/07/2022    Other chronic osteomyelitis, right ankle and foot 10/07/2022    Personal history of nicotine dependence 05/02/2022    Thrombocytopenia, unspecified 10/07/2022    Unspecified open wound, right foot, initial encounter 04/03/2023    Diabetic foot infection 04/03/2023    Subacute osteomyelitis of right foot 04/06/2023    Right foot pain 05/12/2023    Sepsis 05/12/2023    Onychomycosis 05/22/2023    Foot pain, left 05/22/2023    Impaired mobility and ADLs 06/16/2023    Absence of toe of right foot 07/11/2023    Corns and callosity 07/11/2023    Disability of walking 07/11/2023    Fracture 07/11/2023    Limb swelling 07/11/2023    Polyneuropathy 07/11/2023    Pressure ulcer, stage 1 07/11/2023    Shortness of breath 07/11/2023    Generalized weakness 09/10/2023     Resolved Ambulatory Problems     Diagnosis Date Noted    Lactic acidosis 12/01/2021    Abscess of back 12/20/2021     Past Medical History:   Diagnosis Date    Anxiety and depression     Arthritis     Cancer     Chronic pain     Corns and callus     Difficulty walking     Hammertoe     Ingrown toenail     Obesity     Tinea unguium     Type 2 diabetes mellitus with polyneuropathy         Hospital Medications (active)         Dose Frequency Start End Indication     !Patient Home Medications Stored on Unit  2 Times Daily 2/21/2024 --     Admin Instructions: Patient has home medication(s) stored on the nursing unit. Please remove and give to patient before discharge.     Route: Does not apply     acetaminophen (TYLENOL) tablet 650 mg 650 mg Every 4 Hours PRN 11/6/2023 --  PRN pain, headache, fever    Admin Instructions: Based on patient request - if ordered for moderate or severe pain, provider allows for administration of a medication prescribed for a lower pain scale.    Do not exceed 4 grams of acetaminophen in a 24 hr period. Max dose of 2gm for AST/ALT greater than 120 units/L.    If given for pain, use the following pain scale:   Mild Pain = Pain Score of 1-3, CPOT 1-2  Moderate Pain = Pain Score of 4-6, CPOT 3-4  Severe Pain = Pain Score of 7-10, CPOT 5-8     Route: Oral     apixaban (ELIQUIS) tablet 5 mg ([Held by provider] since 3/10/2024  8:59 PM) 5 mg Every 12 Hours Scheduled 11/6/2023 -- Atrial fibrillation     Admin Instructions: Tablet may be crushed and suspended in 60 mL of water or D5W and immediately delivered via NG tube.     Route: Oral     atorvastatin (LIPITOR) tablet 10 mg 10 mg Nightly 11/6/2023 -- Hyperlipidemia     Admin Instructions: Avoid grapefruit juice.     Route: Oral     baclofen (LIORESAL) tablet 10 mg 10 mg 3 Times Daily PRN 3/13/2024 3/20/2024 PRN muscle spasms     Admin Instructions: Take with food if GI upset occurs.     Route: Oral     benzonatate (TESSALON) capsule 100 mg 100 mg 3 Times Daily PRN 1/18/2024 -- PRN cough     Admin Instructions: Do not crush or chew the capsules or tablets. The drug may not work as designed if the capsule or tablet is crushed or chewed. Swallow whole.  Swallow whole.  Do not crush, chew, or open capsule.     Route: Oral     bismuth subsalicylate (PEPTO BISMOL) chewable tablet 524 mg 524 mg 3 Times Daily PRN 2/5/2024 -- PRN Indigestion, Heartburn, Diarrhea     Admin Instructions: Maximum 4192 mg per 24 hours.     Route: Oral     bupivacaine (PF) (MARCAINE) 0.5 % injection 10 mL 10 mL Once 3/11/2024 -- Injection    Route: Injection     dextrose (D50W) (25 g/50 mL) IV injection 25 g 25 g Every 15 Minutes PRN 11/6/2023 -- PRN hypoglycemia     Admin Instructions: Blood sugar less than 70; patient has IV access -  Unresponsive, NPO or Unable To Safely Swallow     Route: Intravenous     dextrose (GLUTOSE) oral gel 15 g 15 g Every 15 Minutes PRN 11/6/2023 -- PRN hypoglycemia     Admin Instructions: BS<70, Patient Alert, Is not NPO, Can safely swallow.     Route: Oral     empagliflozin (JARDIANCE) tablet 10 mg 10 mg Daily 11/7/2023 -- Type 2 diabetes     Route: Oral     ferrous sulfate tablet 325 mg 325 mg Daily With Breakfast 11/21/2023 -- Iron supplement     Admin Instructions: Swallow whole. Do not crush, split, or chew. Take with food if GI upset occurs.     Route: Oral     Cosign for Ordering: Accepted by Max Mehta MD on 11/21/2023 11:34 AM     glucagon (GLUCAGEN) injection 1 mg 1 mg Every 15 Minutes PRN 11/6/2023 -- PRN hypoglycemia     Admin Instructions: Blood Glucose Less Than 70 - Patient Without IV Access - Unresponsive, NPO or Unable To Safely Swallow  Reconstitute powder for injection by adding 1 mL of -supplied sterile diluent or sterile water for injection to a vial containing 1 mg of the drug, to provide solutions containing 1 mg/mL. Shake vial gently to dissolve.     Route: Intramuscular     HOME MED Semaglutide(0.25 or 0.5MG/DOS) (OZEMPIC) solution pen-injector 0.5 mg 0.5 mg Every 7 Days 2/28/2024 -- Type 2 diabetes/Weight loss    Admin Instructions: Please bring to pharmacy for identification and barcoding.     Notes to Pharmacy: Had medication filled as outpatient and we be using his own pen injector     Route: Subcutaneous     Cosign for Ordering: Accepted by Max Mehta MD on 2/27/2024 10:26 AM     Hydrocortisone (Perianal) (ANUSOL-HC) 2.5 % rectal cream  2 Times Daily 1/31/2024 -- Hemorrhoids     Route: Rectal     ibuprofen (ADVIL,MOTRIN) tablet 400 mg 400 mg 2 Times Daily PRN 1/16/2024 -- PRN Mild pain    Admin Instructions: Based on patient request - if ordered for moderate or severe pain, provider allows for administration of a medication prescribed for a lower pain  scale.    Mucous membrane irritant. Do not crush or chew tablet or capsule unless administered through a feeding tube.  If given for pain, use the following pain scale:  Mild Pain = Pain Score of 1-3, CPOT 1-2  Moderate Pain = Pain Score of 4-6, CPOT 3-4  Severe Pain = Pain Score of 7-10, CPOT 5-8     Route: Oral     Cosign for Ordering: Accepted by Max Mehta MD on 1/17/2024 10:44 AM     insulin detemir (LEVEMIR) injection 40 Units 40 Units Daily 1/31/2024 -- Type 2 diabetes     Admin Instructions: Do not hold basal insulin without an order. Consider requesting a dose edit, if needed.        Route: Subcutaneous     Cosign for Ordering: Accepted by Max Mehta MD on 1/31/2024 11:20 AM     insulin detemir (LEVEMIR) injection 40 Units 40 Units Nightly 1/30/2024 -- Type 2 diabetes     Admin Instructions: Do not hold basal insulin without an order. Consider requesting a dose edit, if needed.        Route: Subcutaneous     Cosign for Ordering: Accepted by Max Mehta MD on 1/31/2024 11:20 AM     Insulin Lispro (humaLOG) injection 4-24 Units 4-24 Units 4 Times Daily Before Meals & Nightly 11/6/2023 -- Type 2 diabetes     Admin Instructions: Correction Insulin - High Dose (Total Insulin Dose Greater Than 80 units/day, Insulin Resistant Patient, Patient With Infection, Steroid Treatment)    Blood Glucose 150-199 mg/dL - 4 units  Blood Glucose 200-249 mg/dL - 8 units  Blood Glucose 250-299 mg/dL - 12 units  Blood Glucose 300-349 mg/dL - 16 units  Blood Glucose 350-400 mg/dL - 20 units  Blood Glucose Greater Than 400 mg/dL - 24 units & Call Provider   Caution: Look alike/sound alike drug alert     Route: Subcutaneous     lisinopril (PRINIVIL,ZESTRIL) tablet 2.5 mg 2.5 mg Every 24 Hours Scheduled 11/7/2023 -- Blood pressure    Admin Instructions: Hold for SBP less than 100     Route: Oral     melatonin tablet 10 mg 10 mg Nightly 12/8/2023 -- Sleep aid    Route: Oral     methylPREDNISolone  "acetate (DEPO-medrol) injection 80 mg 80 mg Once 3/11/2024 -- Steroid injection joint space    Admin Instructions: ** Not for IV use **  Caution: Look alike/sound alike drug alert     Route: Intra-articular     mineral oil-hydrophilic petrolatum (AQUAPHOR) ointment 1 Application 1 Application User Specified 3/12/2024 -- Skin care    Admin Instructions: Apply to BLE / feet.      Route: Topical     Cosign for Ordering: Accepted by John Emerson MD on 3/12/2024  5:08 PM     ondansetron (ZOFRAN) injection 4 mg 4 mg Every 6 Hours PRN 11/6/2023 -- PRN nausea     Admin Instructions: If BOTH ondansetron (ZOFRAN) and promethazine (PHENERGAN) are ordered use ondansetron first and THEN promethazine IF ondansetron is ineffective.     Route: Intravenous     ondansetron ODT (ZOFRAN-ODT) disintegrating tablet 4 mg 4 mg Every 6 Hours PRN 11/6/2023 -- PRN nausea     Admin Instructions: \"If multiple N/V medications ordered, use in the following order: Ondansetron, Prochlorperazine, Promethazine. Use PO unless patient refuses or patient unable to swallow.\"  Place on tongue and allow to dissolve.     Route: Oral     oxyCODONE-acetaminophen (PERCOCET) 7.5-325 MG per tablet 1 tablet 1 tablet Every 6 Hours PRN 12/5/2023 -- PRN severe pain     Admin Instructions: Based on patient request - if ordered for moderate or severe pain, provider allows for administration of a medication prescribed for a lower pain scale.  [NIKIA]    Do not exceed 4 grams of acetaminophen in a 24 hr period. Max dose of 2gm for AST/ALT greater than 120 units/L        If given for pain, use the following pain scale:   Mild Pain = Pain Score of 1-3, CPOT 1-2  Moderate Pain = Pain Score of 4-6, CPOT 3-4  Severe Pain = Pain Score of 7-10, CPOT 5-8     Route: Oral     pneumococcal conj. 13-valent (PREVNAR-13) vaccine 0.5 mL 0.5 mL During Hospitalization 11/6/2023 -- Immunization     Admin Instructions:      Route: Intramuscular     pregabalin (LYRICA) capsule 100 mg 100 " mg 3 Times Daily 11/7/2023 -- Neuropathic pain     Admin Instructions:      Route: Oral     saccharomyces boulardii (FLORASTOR) capsule 500 mg 500 mg 2 Times Daily 2/3/2024 -- Gut health     Route: Oral     sertraline (ZOLOFT) tablet 50 mg 50 mg Nightly 2/18/2024 -- Anxiety     Route: Oral     simethicone (MYLICON) chewable tablet 80 mg 80 mg 4 Times Daily PRN 11/6/2023 -- PRN Flatulence     Route: Oral     sodium chloride 0.9 % flush 10 mL 10 mL As Needed 11/6/2023 -- Line care    Route: Intravenous     sodium chloride 0.9 % infusion 40 mL 40 mL As Needed 11/6/2023 -- Line care    Admin Instructions: Following administration of an IV intermittent medication, flush line with 40mL NS at 100mL/hr.     Route: Intravenous     vitamin B-12 (CYANOCOBALAMIN) tablet 1,000 mcg 1,000 mcg Daily 11/7/2023 -- Vitamin b12 supplement    Route: Oral                Psychotropic Medications  Sertraline       Potentially Clinically Significant Medication Issues/PharmD Recommendations:   Psychotropic Medication: N/A  Opioid Use Review: PRN oxycodone/acetaminophen, no recommendations    Medication without an appropriate indication: N/A  Untreated indication: N/A  Missing Duration: N/A  Excessive or Inadequate Dose: N/A  Ineffective Drug Therapy: N/A  Medication Adverse Reactions/Consequences:   Medication Monitoring: Apixaban- No signs or symptoms of bleeding in notes  Drug Interactions: Apixaban/ibuprofen- No signs or symptoms of bleeding   Duplicate Therapy: N/A   PTA Medication Omissions (not currently ordered): N/A   Safety: N/A       Additional notes: None       In completing this Drug Regimen Review for [facility]., have reviewed the electronic medical chart contents, including but not limited to the following: Medication Administration Records (MAR), Prescriber's orders, Progress, nursing and consultants' notes, Laboratory and diagnostic test results, Other sources of information about documented  expressions or indications of distress and/or changes in condition.

## 2024-03-14 NOTE — PLAN OF CARE
Goal Outcome Evaluation: pt AAOX 4. All needs met . Call light and personal belongings within reach on bedside table. Pt cont to work toward losing weight in hope of getting hip surgery. Cont to take pain meds and muscle relaxer.

## 2024-03-15 LAB
GLUCOSE BLDC GLUCOMTR-MCNC: 115 MG/DL (ref 70–99)
GLUCOSE BLDC GLUCOMTR-MCNC: 122 MG/DL (ref 70–99)
GLUCOSE BLDC GLUCOMTR-MCNC: 122 MG/DL (ref 70–99)
GLUCOSE BLDC GLUCOMTR-MCNC: 127 MG/DL (ref 70–99)
GLUCOSE BLDC GLUCOMTR-MCNC: 134 MG/DL (ref 70–99)
GLUCOSE BLDC GLUCOMTR-MCNC: 155 MG/DL (ref 70–99)
GLUCOSE BLDC GLUCOMTR-MCNC: 94 MG/DL (ref 70–99)

## 2024-03-15 PROCEDURE — 63710000001 ONDANSETRON ODT 4 MG TABLET DISPERSIBLE: Performed by: INTERNAL MEDICINE

## 2024-03-15 PROCEDURE — 82948 REAGENT STRIP/BLOOD GLUCOSE: CPT

## 2024-03-15 PROCEDURE — 63710000001 INSULIN DETEMIR PER 5 UNITS: Performed by: PHYSICIAN ASSISTANT

## 2024-03-15 RX ADMIN — ATORVASTATIN CALCIUM 10 MG: 10 TABLET, FILM COATED ORAL at 21:00

## 2024-03-15 RX ADMIN — PREGABALIN 100 MG: 100 CAPSULE ORAL at 08:33

## 2024-03-15 RX ADMIN — OXYCODONE AND ACETAMINOPHEN 1 TABLET: 7.5; 325 TABLET ORAL at 02:04

## 2024-03-15 RX ADMIN — Medication 500 MG: at 08:34

## 2024-03-15 RX ADMIN — BACLOFEN 10 MG: 10 TABLET ORAL at 00:45

## 2024-03-15 RX ADMIN — OXYCODONE AND ACETAMINOPHEN 1 TABLET: 7.5; 325 TABLET ORAL at 08:34

## 2024-03-15 RX ADMIN — OXYCODONE AND ACETAMINOPHEN 1 TABLET: 7.5; 325 TABLET ORAL at 21:00

## 2024-03-15 RX ADMIN — WHITE PETROLATUM 1 APPLICATION: 1.75 OINTMENT TOPICAL at 21:52

## 2024-03-15 RX ADMIN — INSULIN DETEMIR 40 UNITS: 100 INJECTION, SOLUTION SUBCUTANEOUS at 08:32

## 2024-03-15 RX ADMIN — BACLOFEN 10 MG: 10 TABLET ORAL at 08:33

## 2024-03-15 RX ADMIN — WHITE PETROLATUM 1 APPLICATION: 1.75 OINTMENT TOPICAL at 11:55

## 2024-03-15 RX ADMIN — INSULIN DETEMIR 40 UNITS: 100 INJECTION, SOLUTION SUBCUTANEOUS at 20:59

## 2024-03-15 RX ADMIN — CYANOCOBALAMIN TAB 500 MCG 1000 MCG: 500 TAB at 08:33

## 2024-03-15 RX ADMIN — Medication 500 MG: at 21:00

## 2024-03-15 RX ADMIN — EMPAGLIFLOZIN 10 MG: 10 TABLET, FILM COATED ORAL at 08:33

## 2024-03-15 RX ADMIN — PREGABALIN 100 MG: 100 CAPSULE ORAL at 17:30

## 2024-03-15 RX ADMIN — BACLOFEN 10 MG: 10 TABLET ORAL at 21:00

## 2024-03-15 RX ADMIN — ONDANSETRON 4 MG: 4 TABLET, ORALLY DISINTEGRATING ORAL at 13:34

## 2024-03-15 RX ADMIN — Medication 10 MG: at 21:00

## 2024-03-15 RX ADMIN — SIMETHICONE 80 MG: 80 TABLET, CHEWABLE ORAL at 04:43

## 2024-03-15 RX ADMIN — OXYCODONE AND ACETAMINOPHEN 1 TABLET: 7.5; 325 TABLET ORAL at 14:23

## 2024-03-15 RX ADMIN — PREGABALIN 100 MG: 100 CAPSULE ORAL at 21:00

## 2024-03-15 RX ADMIN — LISINOPRIL 2.5 MG: 2.5 TABLET ORAL at 08:33

## 2024-03-15 RX ADMIN — FERROUS SULFATE TAB 325 MG (65 MG ELEMENTAL FE) 325 MG: 325 (65 FE) TAB at 08:33

## 2024-03-15 RX ADMIN — SERTRALINE HYDROCHLORIDE 50 MG: 50 TABLET ORAL at 21:00

## 2024-03-15 NOTE — PLAN OF CARE
Goal Outcome Evaluation:PT A&O able to voice needs and wants PRN pain meds given per orders PT states not effective however no facial grimace PT verbally abusive to PCA, nurse spoke with PT and assigned another PCA

## 2024-03-15 NOTE — PLAN OF CARE
Goal Outcome Evaluation:                     Had one episode of nausea, relieved with current regimen, VSS, bowel movement today. CHRISTOPHER, RN

## 2024-03-15 NOTE — PLAN OF CARE
Goal Outcome Evaluation:  Plan of Care Reviewed With: patient        Progress: no change  Patient alert and oriented x4. Given PRN Percocet at 0827 for c/o left hip pain 10/10. Med effective. Given PRN Baclofen at 1330 for c/o left hip spasms. Med effective. Voices needs. Con't current POC.

## 2024-03-16 PROCEDURE — 63710000001 INSULIN DETEMIR PER 5 UNITS: Performed by: PHYSICIAN ASSISTANT

## 2024-03-16 PROCEDURE — 63710000001 INSULIN LISPRO (HUMAN) PER 5 UNITS: Performed by: INTERNAL MEDICINE

## 2024-03-16 PROCEDURE — 82948 REAGENT STRIP/BLOOD GLUCOSE: CPT

## 2024-03-16 RX ADMIN — INSULIN DETEMIR 40 UNITS: 100 INJECTION, SOLUTION SUBCUTANEOUS at 20:14

## 2024-03-16 RX ADMIN — OXYCODONE AND ACETAMINOPHEN 1 TABLET: 7.5; 325 TABLET ORAL at 06:19

## 2024-03-16 RX ADMIN — PREGABALIN 100 MG: 100 CAPSULE ORAL at 20:14

## 2024-03-16 RX ADMIN — EMPAGLIFLOZIN 10 MG: 10 TABLET, FILM COATED ORAL at 08:43

## 2024-03-16 RX ADMIN — Medication 500 MG: at 20:13

## 2024-03-16 RX ADMIN — FERROUS SULFATE TAB 325 MG (65 MG ELEMENTAL FE) 325 MG: 325 (65 FE) TAB at 08:43

## 2024-03-16 RX ADMIN — PREGABALIN 100 MG: 100 CAPSULE ORAL at 15:54

## 2024-03-16 RX ADMIN — SERTRALINE HYDROCHLORIDE 50 MG: 50 TABLET ORAL at 20:13

## 2024-03-16 RX ADMIN — ATORVASTATIN CALCIUM 10 MG: 10 TABLET, FILM COATED ORAL at 20:13

## 2024-03-16 RX ADMIN — INSULIN LISPRO 12 UNITS: 100 INJECTION, SOLUTION INTRAVENOUS; SUBCUTANEOUS at 20:13

## 2024-03-16 RX ADMIN — PREGABALIN 100 MG: 100 CAPSULE ORAL at 08:43

## 2024-03-16 RX ADMIN — WHITE PETROLATUM 1 APPLICATION: 1.75 OINTMENT TOPICAL at 10:25

## 2024-03-16 RX ADMIN — IBUPROFEN 400 MG: 400 TABLET ORAL at 10:17

## 2024-03-16 RX ADMIN — Medication 10 MG: at 20:13

## 2024-03-16 RX ADMIN — BACLOFEN 10 MG: 10 TABLET ORAL at 17:20

## 2024-03-16 RX ADMIN — LISINOPRIL 2.5 MG: 2.5 TABLET ORAL at 08:43

## 2024-03-16 RX ADMIN — SIMETHICONE 80 MG: 80 TABLET, CHEWABLE ORAL at 22:22

## 2024-03-16 RX ADMIN — WHITE PETROLATUM 1 APPLICATION: 1.75 OINTMENT TOPICAL at 22:14

## 2024-03-16 RX ADMIN — BACLOFEN 10 MG: 10 TABLET ORAL at 06:19

## 2024-03-16 RX ADMIN — INSULIN DETEMIR 40 UNITS: 100 INJECTION, SOLUTION SUBCUTANEOUS at 08:42

## 2024-03-16 RX ADMIN — CYANOCOBALAMIN TAB 500 MCG 1000 MCG: 500 TAB at 08:43

## 2024-03-16 RX ADMIN — Medication 500 MG: at 08:43

## 2024-03-16 NOTE — PLAN OF CARE
Goal Outcome Evaluation:      Pt alert and oriented x 4. Percocet and Baclofen given at 2100 for c/o pain and spasm. Medication effective. Pt rested very well this shift. Continues to refuse bed alarms. Continue current plan of care and inform oncoming shift of any changes.

## 2024-03-17 PROCEDURE — 63710000001 INSULIN LISPRO (HUMAN) PER 5 UNITS: Performed by: INTERNAL MEDICINE

## 2024-03-17 PROCEDURE — 82948 REAGENT STRIP/BLOOD GLUCOSE: CPT

## 2024-03-17 PROCEDURE — 63710000001 INSULIN DETEMIR PER 5 UNITS: Performed by: PHYSICIAN ASSISTANT

## 2024-03-17 RX ADMIN — OXYCODONE AND ACETAMINOPHEN 1 TABLET: 7.5; 325 TABLET ORAL at 21:22

## 2024-03-17 RX ADMIN — INSULIN DETEMIR 40 UNITS: 100 INJECTION, SOLUTION SUBCUTANEOUS at 21:20

## 2024-03-17 RX ADMIN — WHITE PETROLATUM 1 APPLICATION: 1.75 OINTMENT TOPICAL at 11:05

## 2024-03-17 RX ADMIN — WHITE PETROLATUM 1 APPLICATION: 1.75 OINTMENT TOPICAL at 21:23

## 2024-03-17 RX ADMIN — LISINOPRIL 2.5 MG: 2.5 TABLET ORAL at 08:22

## 2024-03-17 RX ADMIN — OXYCODONE AND ACETAMINOPHEN 1 TABLET: 7.5; 325 TABLET ORAL at 11:05

## 2024-03-17 RX ADMIN — EMPAGLIFLOZIN 10 MG: 10 TABLET, FILM COATED ORAL at 08:22

## 2024-03-17 RX ADMIN — Medication 500 MG: at 08:22

## 2024-03-17 RX ADMIN — FERROUS SULFATE TAB 325 MG (65 MG ELEMENTAL FE) 325 MG: 325 (65 FE) TAB at 08:23

## 2024-03-17 RX ADMIN — Medication 10 MG: at 21:21

## 2024-03-17 RX ADMIN — ATORVASTATIN CALCIUM 10 MG: 10 TABLET, FILM COATED ORAL at 21:22

## 2024-03-17 RX ADMIN — INSULIN LISPRO 4 UNITS: 100 INJECTION, SOLUTION INTRAVENOUS; SUBCUTANEOUS at 12:22

## 2024-03-17 RX ADMIN — BACLOFEN 10 MG: 10 TABLET ORAL at 11:05

## 2024-03-17 RX ADMIN — SERTRALINE HYDROCHLORIDE 50 MG: 50 TABLET ORAL at 21:22

## 2024-03-17 RX ADMIN — PREGABALIN 100 MG: 100 CAPSULE ORAL at 17:32

## 2024-03-17 RX ADMIN — CYANOCOBALAMIN TAB 500 MCG 1000 MCG: 500 TAB at 08:22

## 2024-03-17 RX ADMIN — IBUPROFEN 400 MG: 400 TABLET ORAL at 05:14

## 2024-03-17 RX ADMIN — Medication 500 MG: at 21:21

## 2024-03-17 RX ADMIN — BACLOFEN 10 MG: 10 TABLET ORAL at 01:39

## 2024-03-17 RX ADMIN — PREGABALIN 100 MG: 100 CAPSULE ORAL at 21:22

## 2024-03-17 RX ADMIN — INSULIN DETEMIR 40 UNITS: 100 INJECTION, SOLUTION SUBCUTANEOUS at 08:22

## 2024-03-17 RX ADMIN — BACLOFEN 10 MG: 10 TABLET ORAL at 21:21

## 2024-03-17 RX ADMIN — OXYCODONE AND ACETAMINOPHEN 1 TABLET: 7.5; 325 TABLET ORAL at 01:39

## 2024-03-17 RX ADMIN — PREGABALIN 100 MG: 100 CAPSULE ORAL at 08:22

## 2024-03-17 NOTE — PLAN OF CARE
Goal Outcome Evaluation:  Plan of Care Reviewed With: patient        Progress: no change  Outcome Evaluation: Patient is alert and oriented x4. Continues to be bedbound and refuses bed alarms. Gave PRN simithicone for complaints of gas. Medication effective. PRN Baclofen and Percocet given for hip pain and spasm at 0139. Medication effective. Continue current plan of care.

## 2024-03-17 NOTE — PLAN OF CARE
Goal Outcome Evaluation:              Outcome Evaluation: PT is AAOx4, VSS, self turns with overhead trapeze bar,  refuses bed alarms, falls risk education provided, chronic hip pain treated with PRN meds, see MAR, patient does not demonstrate willingness to get OOB, sit in chair. use BSC or ambulate, encouraging patient to increase activity has been unsuccessful. As per ortho provider note, patient must be 40 BMI or less to be a candiate for hip replacement, no plan for DC as of yet, continue with current POC.

## 2024-03-17 NOTE — NURSING NOTE
Resident alert, oriented, able to make needs known to staff. Remains bedbound. Remains incontinent as well as continent of bowel. Medicated with prn baclofen x1, effective, medicated with prn ibuprofen x1, effective. Resident pleasant and cooperative.

## 2024-03-18 LAB
GLUCOSE BLDC GLUCOMTR-MCNC: 106 MG/DL (ref 70–99)
GLUCOSE BLDC GLUCOMTR-MCNC: 110 MG/DL (ref 70–99)
GLUCOSE BLDC GLUCOMTR-MCNC: 112 MG/DL (ref 70–99)
GLUCOSE BLDC GLUCOMTR-MCNC: 119 MG/DL (ref 70–99)
GLUCOSE BLDC GLUCOMTR-MCNC: 120 MG/DL (ref 70–99)
GLUCOSE BLDC GLUCOMTR-MCNC: 123 MG/DL (ref 70–99)
GLUCOSE BLDC GLUCOMTR-MCNC: 126 MG/DL (ref 70–99)
GLUCOSE BLDC GLUCOMTR-MCNC: 126 MG/DL (ref 70–99)
GLUCOSE BLDC GLUCOMTR-MCNC: 128 MG/DL (ref 70–99)
GLUCOSE BLDC GLUCOMTR-MCNC: 144 MG/DL (ref 70–99)
GLUCOSE BLDC GLUCOMTR-MCNC: 168 MG/DL (ref 70–99)
GLUCOSE BLDC GLUCOMTR-MCNC: 271 MG/DL (ref 70–99)

## 2024-03-18 PROCEDURE — 63710000001 INSULIN DETEMIR PER 5 UNITS: Performed by: PHYSICIAN ASSISTANT

## 2024-03-18 PROCEDURE — 99308 SBSQ NF CARE LOW MDM 20: CPT | Performed by: PHYSICIAN ASSISTANT

## 2024-03-18 PROCEDURE — 63710000001 ONDANSETRON ODT 4 MG TABLET DISPERSIBLE: Performed by: INTERNAL MEDICINE

## 2024-03-18 PROCEDURE — 82948 REAGENT STRIP/BLOOD GLUCOSE: CPT

## 2024-03-18 RX ORDER — BACLOFEN 10 MG/1
10 TABLET ORAL 3 TIMES DAILY PRN
Status: DISPENSED | OUTPATIENT
Start: 2024-03-18 | End: 2024-04-07

## 2024-03-18 RX ORDER — ACETAMINOPHEN 325 MG/1
650 TABLET ORAL EVERY 4 HOURS PRN
Status: DISPENSED | OUTPATIENT
Start: 2024-03-18

## 2024-03-18 RX ORDER — OXYCODONE AND ACETAMINOPHEN 10; 325 MG/1; MG/1
1 TABLET ORAL EVERY 6 HOURS PRN
Status: DISPENSED | OUTPATIENT
Start: 2024-03-18 | End: 2024-03-23

## 2024-03-18 RX ADMIN — BACLOFEN 10 MG: 10 TABLET ORAL at 05:37

## 2024-03-18 RX ADMIN — ONDANSETRON 4 MG: 4 TABLET, ORALLY DISINTEGRATING ORAL at 08:50

## 2024-03-18 RX ADMIN — INSULIN DETEMIR 40 UNITS: 100 INJECTION, SOLUTION SUBCUTANEOUS at 21:04

## 2024-03-18 RX ADMIN — WHITE PETROLATUM 1 APPLICATION: 1.75 OINTMENT TOPICAL at 09:46

## 2024-03-18 RX ADMIN — SERTRALINE HYDROCHLORIDE 50 MG: 50 TABLET ORAL at 21:04

## 2024-03-18 RX ADMIN — INSULIN DETEMIR 40 UNITS: 100 INJECTION, SOLUTION SUBCUTANEOUS at 08:11

## 2024-03-18 RX ADMIN — LISINOPRIL 2.5 MG: 2.5 TABLET ORAL at 08:11

## 2024-03-18 RX ADMIN — CYANOCOBALAMIN TAB 500 MCG 1000 MCG: 500 TAB at 08:11

## 2024-03-18 RX ADMIN — FERROUS SULFATE TAB 325 MG (65 MG ELEMENTAL FE) 325 MG: 325 (65 FE) TAB at 08:11

## 2024-03-18 RX ADMIN — Medication 500 MG: at 08:11

## 2024-03-18 RX ADMIN — OXYCODONE HYDROCHLORIDE AND ACETAMINOPHEN 1 TABLET: 10; 325 TABLET ORAL at 16:33

## 2024-03-18 RX ADMIN — OXYCODONE AND ACETAMINOPHEN 1 TABLET: 7.5; 325 TABLET ORAL at 09:44

## 2024-03-18 RX ADMIN — APIXABAN 5 MG: 5 TABLET, FILM COATED ORAL at 21:03

## 2024-03-18 RX ADMIN — DICLOFENAC SODIUM 4 G: 10 GEL TOPICAL at 21:10

## 2024-03-18 RX ADMIN — EMPAGLIFLOZIN 10 MG: 10 TABLET, FILM COATED ORAL at 08:11

## 2024-03-18 RX ADMIN — PREGABALIN 100 MG: 100 CAPSULE ORAL at 21:03

## 2024-03-18 RX ADMIN — ACETAMINOPHEN 650 MG: 325 TABLET ORAL at 05:37

## 2024-03-18 RX ADMIN — ATORVASTATIN CALCIUM 10 MG: 10 TABLET, FILM COATED ORAL at 21:04

## 2024-03-18 RX ADMIN — Medication 10 MG: at 21:03

## 2024-03-18 RX ADMIN — PREGABALIN 100 MG: 100 CAPSULE ORAL at 15:34

## 2024-03-18 RX ADMIN — IBUPROFEN 400 MG: 400 TABLET ORAL at 00:51

## 2024-03-18 RX ADMIN — PREGABALIN 100 MG: 100 CAPSULE ORAL at 08:11

## 2024-03-18 RX ADMIN — Medication 500 MG: at 21:04

## 2024-03-18 RX ADMIN — WHITE PETROLATUM 1 APPLICATION: 1.75 OINTMENT TOPICAL at 21:06

## 2024-03-18 NOTE — PLAN OF CARE
Goal Outcome Evaluation:  Plan of Care Reviewed With: patient        Progress: no change  Outcome Evaluation: Alert and oriented X 4. Vital signs stable. Percocet, baclofen, and ibuprofen administered for pain management in left shoulder and hip.  No significant change. Continue plan of care.

## 2024-03-18 NOTE — PROGRESS NOTES
[facility]   Hospitalist Progress Note  Date: 3/18/2024  Patient Name: Jose Shaikh  : 1960  MRN: [id number]  Date of admission: 2023      Subjective   Subjective     Chief Complaint: Weakness    Summary: Jose Shaikh is a 63 y.o. male  initially hospitalized on 9/10/2023, prolonged hospitalization for treatment of management of generalized weakness, deconditioning, with acute issues of diarrhea, C. difficile negative.  Diarrhea improved.  Had small hematoma after ambulating on his foot.  Unfortunately with exhaustive efforts to try to place this gentleman after 39 days of hospitalization, we are unable to find a facility that will accept him.  He has no further acute needs or requirements for inpatient monitoring and management, his labs and vitals are stable, he is tolerating oral intake, and has been refusing turns and repositionings and other interventions with nursing staff despite recommendations.  Patient discharged in hemodynamically stable condition on 10/19/2023 to follow-up with PCP within 1 week. Unfortunately we cannot solve all of his social issues during this hospitalization due to lack of resources and participation from the patient's perspective despite all our efforts.  Patient has a financial means of making arrangements at home.  Patient appealed his discharge.  Lost his appeal.  LCD: 10/20/23, PFL beginning: 10/21/23 @ 12pm.  Due to an inability to place the patient in a.m. nursing home he has been placed in our skilled nursing facility until arrangements can be made or until he is able to care for himself.      Interval Followup: Patient seen and examined resting comfortably complaining of continued hip and knee pain.  Patient does not wish to pursue repeat joint injection states it did not work.  Continuing on Ozempic for weight loss but still needs to lose more weight before replacement can be pursued we will add Voltaren gel 4 times daily to hips and knees will increase  pain medication slightly patient states rate limiting factor to his mobility is his pain.    Review of systems: All systems reviewed and negative except what is noted above in interval follow-up   Objective   Objective     Vitals:   Temp:  [97.7 °F (36.5 °C)-97.8 °F (36.6 °C)] 97.7 °F (36.5 °C)  Heart Rate:  [78-80] 80  Resp:  [18] 18  BP: (124-140)/(65) 140/65    Physical Exam   Constitutional: Awake alert oriented no acute distress  Respiratory: No wheeze  GI: Abdomen: Obese  Neuro/psych:  Calm mood.  No dysarthria.  Extremities: Some lower extremity edema noted    Result Review    Result Review:  I have personally reviewed the results from the time of this admission to 3/18/2024 16:12 EDT and agree with these findings:  [x]  Laboratory  [x]  Microbiology  []  Radiology  []  EKG/Telemetry   []  Cardiology/Vascular   []  Pathology  []  Old records  []  Other:    Assessment & Plan   Assessment / Plan   Assessment:  Hospital-acquired weakness  Influenza A  C. difficile diarrhea treated  Generalized weakness  Deconditioning  DM (Kami Garcia and PCP)  HTN  PAF (on Eliquis; previously seen Dr. Duval)  Morbid obesity with BMI 44  Debility with wheelchair dependence  Hx of severe left hip pain  Severe degenerative joint disease of lower extremities  Hx L calcaneus osteomyelitis  Hx R transmetatarsal  Chronic bilateral foot wounds with right transmetatarsal amputation, healing by secondary intention (wound care clinic; podiatrist Gordon)  Thrombocytopenia, resolved      Plan:    Patient refusing joint injection states it does not work.  Adding Voltaren gel to knees and hips  Increasing Percocet dose  Continuing with baclofen  Tolerating Ozempic at 0.5 mg weekly, recently increased to 0.5 Mg on 2/28.  Continue basal insulin and SSI per protocol titrate as needed  Continue Eliquis for stroke prophylaxis  Continue probiotics  Discussed with RN    DVT prophylaxis:  Medical DVT prophylaxis orders are present.    CODE STATUS:    Code Status (Patient has no pulse and is not breathing): CPR (Attempt to Resuscitate)  Medical Interventions (Patient has pulse or is breathing): Full Support

## 2024-03-18 NOTE — PLAN OF CARE
Goal Outcome Evaluation:  Plan of Care Reviewed With: patient      Patient alert and oriented X 4. Patient pleasant and cooperative with staff.  Percocet at administered 0944 and 1630 for pain management in left shoulder and hip. No significant change. Continue plan of care.

## 2024-03-19 LAB
GLUCOSE BLDC GLUCOMTR-MCNC: 105 MG/DL (ref 70–99)
GLUCOSE BLDC GLUCOMTR-MCNC: 125 MG/DL (ref 70–99)
GLUCOSE BLDC GLUCOMTR-MCNC: 132 MG/DL (ref 70–99)

## 2024-03-19 PROCEDURE — 63710000001 ONDANSETRON ODT 4 MG TABLET DISPERSIBLE: Performed by: INTERNAL MEDICINE

## 2024-03-19 PROCEDURE — 82948 REAGENT STRIP/BLOOD GLUCOSE: CPT

## 2024-03-19 PROCEDURE — 63710000001 INSULIN DETEMIR PER 5 UNITS: Performed by: PHYSICIAN ASSISTANT

## 2024-03-19 RX ADMIN — WHITE PETROLATUM 1 APPLICATION: 1.75 OINTMENT TOPICAL at 22:27

## 2024-03-19 RX ADMIN — BACLOFEN 10 MG: 10 TABLET ORAL at 16:51

## 2024-03-19 RX ADMIN — Medication 500 MG: at 20:02

## 2024-03-19 RX ADMIN — PREGABALIN 100 MG: 100 CAPSULE ORAL at 09:27

## 2024-03-19 RX ADMIN — LISINOPRIL 2.5 MG: 2.5 TABLET ORAL at 09:27

## 2024-03-19 RX ADMIN — ONDANSETRON 4 MG: 4 TABLET, ORALLY DISINTEGRATING ORAL at 03:39

## 2024-03-19 RX ADMIN — IBUPROFEN 400 MG: 400 TABLET ORAL at 05:17

## 2024-03-19 RX ADMIN — OXYCODONE HYDROCHLORIDE AND ACETAMINOPHEN 1 TABLET: 10; 325 TABLET ORAL at 02:04

## 2024-03-19 RX ADMIN — APIXABAN 5 MG: 5 TABLET, FILM COATED ORAL at 09:27

## 2024-03-19 RX ADMIN — PREGABALIN 100 MG: 100 CAPSULE ORAL at 20:02

## 2024-03-19 RX ADMIN — FERROUS SULFATE TAB 325 MG (65 MG ELEMENTAL FE) 325 MG: 325 (65 FE) TAB at 09:27

## 2024-03-19 RX ADMIN — APIXABAN 5 MG: 5 TABLET, FILM COATED ORAL at 20:03

## 2024-03-19 RX ADMIN — Medication 500 MG: at 09:27

## 2024-03-19 RX ADMIN — SERTRALINE HYDROCHLORIDE 50 MG: 50 TABLET ORAL at 20:03

## 2024-03-19 RX ADMIN — ATORVASTATIN CALCIUM 10 MG: 10 TABLET, FILM COATED ORAL at 20:03

## 2024-03-19 RX ADMIN — INSULIN DETEMIR 40 UNITS: 100 INJECTION, SOLUTION SUBCUTANEOUS at 20:03

## 2024-03-19 RX ADMIN — PREGABALIN 100 MG: 100 CAPSULE ORAL at 16:51

## 2024-03-19 RX ADMIN — OXYCODONE HYDROCHLORIDE AND ACETAMINOPHEN 1 TABLET: 10; 325 TABLET ORAL at 19:25

## 2024-03-19 RX ADMIN — EMPAGLIFLOZIN 10 MG: 10 TABLET, FILM COATED ORAL at 09:27

## 2024-03-19 RX ADMIN — Medication 10 MG: at 20:02

## 2024-03-19 RX ADMIN — BACLOFEN 10 MG: 10 TABLET ORAL at 01:00

## 2024-03-19 RX ADMIN — INSULIN DETEMIR 40 UNITS: 100 INJECTION, SOLUTION SUBCUTANEOUS at 09:27

## 2024-03-19 RX ADMIN — CYANOCOBALAMIN TAB 500 MCG 1000 MCG: 500 TAB at 09:27

## 2024-03-19 NOTE — PLAN OF CARE
Goal Outcome Evaluation:  Plan of Care Reviewed With: patient        Progress: improving  Outcome Evaluation: Resident alert, oriented, able to make needs known. Remains bedbound. blood glucose stable with all 3 meals, no sliding scale required. Medicated with prn baclofen x1, prn motrin x1, effective. Residebnt pleasant and cooperative.

## 2024-03-19 NOTE — SIGNIFICANT NOTE
Wound Eval / Progress Noted    KEO Dominguez     Patient Name: Jose Shaikh  : 1960  MRN: [id number]  Today's Date: 3/19/2024                 Admit Date: 2023    Visit Dx:    ICD-10-CM ICD-9-CM   1. Difficulty in walking  R26.2 719.7   2. Type 2 diabetes mellitus with other specified complication, unspecified whether long term insulin use  E11.69 250.80         Debility    Ulcer of right foot    Ulcerated, foot, left, limited to breakdown of skin    Onychomycosis        Past Medical History:   Diagnosis Date    Absence of toe of right foot     Acute osteomyelitis of left calcaneus  2021    Anxiety and depression     Arthritis     Cancer     Chronic pain     STATES HIS PAIN IS 10/10 AAT    Claustrophobia     Corns and callus     Diabetic ulcer of left heel associated with type 2 DM 2021    Diabetic ulcer of left heel associated with type 2 DM 2021    Diabetic ulcer of right midfoot associated with type 2 DM 2021    Difficulty walking     Essential hypertension 2021    Hammertoe     Hyperlipidemia LDL goal <100 2021    Ingrown toenail     Obesity     Paroxysmal atrial fibrillation 2021    Polyneuropathy     Pressure ulcer, stage 1     Tinea unguium     Type 2 diabetes mellitus with polyneuropathy         Past Surgical History:   Procedure Laterality Date    CYST REMOVAL      center of back; benign    EYE SURGERY      INCISION AND DRAINAGE ABSCESS      back    INCISION AND DRAINAGE LEG Right 12/10/2021    Procedure: INCISION AND DRAINAGE LOWER EXTREMITY;  Surgeon: Ash Leyva DPM;  Location: [facility] MAIN OR;  Service: Podiatry;  Laterality: Right;    OTHER SURGICAL HISTORY      Surgical clips left foot    TOE SURGERY Right     Removal of 5th toe    TRANS METATARSAL AMPUTATION Right 2021    Procedure: AMPUTATION TRANS METATARSAL;  Surgeon: Ash Leyva DPM;  Location: [facility] MAIN OR;  Service: Podiatry;  Laterality: Right;    VASCULAR  SURGERY      WRIST SURGERY Left     repair of injury         Physical Assessment:  Wound 01/22/24 1343 Left anterior fourth toe Abrasion (Active)   Wound Image   03/19/24 1509   Dressing Appearance open to air 03/19/24 1509   Closure None 03/19/24 1509   Base dry;red 03/19/24 1509   Periwound dry;intact 03/19/24 1509   Periwound Temperature warm 03/19/24 1509   Periwound Skin Turgor soft 03/19/24 1509   Edges rolled/closed 03/19/24 1509   Drainage Amount none 03/19/24 1509   Dressing Care open to air 03/19/24 1509   Periwound Care moisturizer applied 03/19/24 1509       Wound 03/19/24 1509 Left lower abdomen Blisters (Active)   Wound Image   03/19/24 1509   Dressing Appearance open to air 03/19/24 1509   Closure None 03/19/24 1509   Base maroon/purple;dry 03/19/24 1509   Periwound blistered;intact 03/19/24 1509   Periwound Temperature warm 03/19/24 1509   Periwound Skin Turgor soft 03/19/24 1509   Edges rolled/closed 03/19/24 1509   Drainage Amount none 03/19/24 1509      Wound Check / Follow-up: Patient seen today for wound follow-up. Patient is awake and alert at time of visit. He is agreeable to assessment.    Patient's dry skin is improving to BLE. Dry, crusted area remains to left fourth toe. Applied topical treatment / moisturizer. Recommending to continue current treatment.    Blood filled blister noted to left lower abdomen. No drainage noted. Recommending daily dressing changes with non-adherent petroleum based gauze and silicone border dressing.    Moisture and redness remain within skin folds. Recommending to continue current skin care with application of cornstarch powder.    Patient refused assessment of buttocks / sacral area at this time.       Impression: Dry skin. Crusted area to left fourth toe. Blood filled blister to abdomen. Moisture and redness within skin folds.      Short term goals: Regain skin integrity, skin protection, moisture prevention, pressure reduction, skin care, topical  treatment.    Dottie Guevara RN    3/19/2024    16:06 EDT

## 2024-03-20 LAB
GLUCOSE BLDC GLUCOMTR-MCNC: 101 MG/DL (ref 70–99)
GLUCOSE BLDC GLUCOMTR-MCNC: 113 MG/DL (ref 70–99)
GLUCOSE BLDC GLUCOMTR-MCNC: 124 MG/DL (ref 70–99)
GLUCOSE BLDC GLUCOMTR-MCNC: 132 MG/DL (ref 70–99)

## 2024-03-20 PROCEDURE — 82948 REAGENT STRIP/BLOOD GLUCOSE: CPT

## 2024-03-20 PROCEDURE — 63710000001 ONDANSETRON ODT 4 MG TABLET DISPERSIBLE: Performed by: INTERNAL MEDICINE

## 2024-03-20 PROCEDURE — 63710000001 INSULIN DETEMIR PER 5 UNITS: Performed by: PHYSICIAN ASSISTANT

## 2024-03-20 PROCEDURE — 82948 REAGENT STRIP/BLOOD GLUCOSE: CPT | Performed by: INTERNAL MEDICINE

## 2024-03-20 PROCEDURE — 63710000001 INSULIN LISPRO (HUMAN) PER 5 UNITS: Performed by: INTERNAL MEDICINE

## 2024-03-20 RX ORDER — HYDROCORTISONE 25 MG/G
CREAM TOPICAL 2 TIMES DAILY PRN
Status: ACTIVE | OUTPATIENT
Start: 2024-03-20

## 2024-03-20 RX ADMIN — DICLOFENAC SODIUM 4 G: 10 GEL TOPICAL at 08:57

## 2024-03-20 RX ADMIN — OXYCODONE HYDROCHLORIDE AND ACETAMINOPHEN 1 TABLET: 10; 325 TABLET ORAL at 19:24

## 2024-03-20 RX ADMIN — BACLOFEN 10 MG: 10 TABLET ORAL at 19:23

## 2024-03-20 RX ADMIN — WHITE PETROLATUM 1 APPLICATION: 1.75 OINTMENT TOPICAL at 21:42

## 2024-03-20 RX ADMIN — WHITE PETROLATUM 1 APPLICATION: 1.75 OINTMENT TOPICAL at 12:57

## 2024-03-20 RX ADMIN — APIXABAN 5 MG: 5 TABLET, FILM COATED ORAL at 20:09

## 2024-03-20 RX ADMIN — PREGABALIN 100 MG: 100 CAPSULE ORAL at 08:56

## 2024-03-20 RX ADMIN — INSULIN DETEMIR 40 UNITS: 100 INJECTION, SOLUTION SUBCUTANEOUS at 08:56

## 2024-03-20 RX ADMIN — DICLOFENAC SODIUM 4 G: 10 GEL TOPICAL at 12:57

## 2024-03-20 RX ADMIN — Medication 10 MG: at 20:08

## 2024-03-20 RX ADMIN — Medication 500 MG: at 20:09

## 2024-03-20 RX ADMIN — BACLOFEN 10 MG: 10 TABLET ORAL at 09:39

## 2024-03-20 RX ADMIN — PREGABALIN 100 MG: 100 CAPSULE ORAL at 20:08

## 2024-03-20 RX ADMIN — ATORVASTATIN CALCIUM 10 MG: 10 TABLET, FILM COATED ORAL at 20:08

## 2024-03-20 RX ADMIN — DICLOFENAC SODIUM 4 G: 10 GEL TOPICAL at 20:10

## 2024-03-20 RX ADMIN — PREGABALIN 100 MG: 100 CAPSULE ORAL at 16:59

## 2024-03-20 RX ADMIN — OXYCODONE HYDROCHLORIDE AND ACETAMINOPHEN 1 TABLET: 10; 325 TABLET ORAL at 04:23

## 2024-03-20 RX ADMIN — CYANOCOBALAMIN TAB 500 MCG 1000 MCG: 500 TAB at 08:56

## 2024-03-20 RX ADMIN — INSULIN DETEMIR 40 UNITS: 100 INJECTION, SOLUTION SUBCUTANEOUS at 20:09

## 2024-03-20 RX ADMIN — ONDANSETRON 4 MG: 4 TABLET, ORALLY DISINTEGRATING ORAL at 21:42

## 2024-03-20 RX ADMIN — Medication 500 MG: at 08:56

## 2024-03-20 RX ADMIN — SERTRALINE HYDROCHLORIDE 50 MG: 50 TABLET ORAL at 20:08

## 2024-03-20 RX ADMIN — IBUPROFEN 400 MG: 400 TABLET ORAL at 07:34

## 2024-03-20 RX ADMIN — EMPAGLIFLOZIN 10 MG: 10 TABLET, FILM COATED ORAL at 08:56

## 2024-03-20 RX ADMIN — APIXABAN 5 MG: 5 TABLET, FILM COATED ORAL at 08:56

## 2024-03-20 RX ADMIN — BACLOFEN 10 MG: 10 TABLET ORAL at 01:38

## 2024-03-20 RX ADMIN — BISMUTH SUBSALICYLATE 524 MG: 262 TABLET, CHEWABLE ORAL at 14:30

## 2024-03-20 RX ADMIN — INSULIN LISPRO 4 UNITS: 100 INJECTION, SOLUTION INTRAVENOUS; SUBCUTANEOUS at 20:09

## 2024-03-20 RX ADMIN — LISINOPRIL 2.5 MG: 2.5 TABLET ORAL at 08:56

## 2024-03-20 RX ADMIN — FERROUS SULFATE TAB 325 MG (65 MG ELEMENTAL FE) 325 MG: 325 (65 FE) TAB at 08:56

## 2024-03-20 NOTE — PLAN OF CARE
Goal Outcome Evaluation:  Plan of Care Reviewed With: patient        Progress: no change  Outcome Evaluation: Alert and oriented x4; able to call for assist as needed. Baclofen x1 and percocet x2 administered for left hip pain this shift. Continues to refuse turns but assists to turn after incontinence episodes using trapeze bar. Call light within reach; care plan ongoing.

## 2024-03-20 NOTE — PLAN OF CARE
Goal Outcome Evaluation:  Plan of Care Reviewed With: patient        Progress: no change  Outcome Evaluation: Resident alert, oriented, able to make needs known to staff. Remains bedbound. refused weekly wt today, states will get it on friday. Medicated with prn Motrin x1, prn Baclofen x1, effective, Blood glucose stable for all meals. doordashed x2 today. Remains continent, as well as incont of bowels. Medicated with prn Pepto bismol x1. evaluating outcome. Resident pleasant and cooperative.

## 2024-03-21 PROCEDURE — 82948 REAGENT STRIP/BLOOD GLUCOSE: CPT

## 2024-03-21 PROCEDURE — 63710000001 INSULIN DETEMIR PER 5 UNITS: Performed by: PHYSICIAN ASSISTANT

## 2024-03-21 RX ADMIN — PREGABALIN 100 MG: 100 CAPSULE ORAL at 16:34

## 2024-03-21 RX ADMIN — DICLOFENAC SODIUM 4 G: 10 GEL TOPICAL at 11:43

## 2024-03-21 RX ADMIN — DICLOFENAC SODIUM 4 G: 10 GEL TOPICAL at 21:01

## 2024-03-21 RX ADMIN — BACLOFEN 10 MG: 10 TABLET ORAL at 16:40

## 2024-03-21 RX ADMIN — CYANOCOBALAMIN TAB 500 MCG 1000 MCG: 500 TAB at 08:53

## 2024-03-21 RX ADMIN — OXYCODONE HYDROCHLORIDE AND ACETAMINOPHEN 1 TABLET: 10; 325 TABLET ORAL at 03:22

## 2024-03-21 RX ADMIN — BACLOFEN 10 MG: 10 TABLET ORAL at 05:23

## 2024-03-21 RX ADMIN — SERTRALINE HYDROCHLORIDE 50 MG: 50 TABLET ORAL at 21:00

## 2024-03-21 RX ADMIN — APIXABAN 5 MG: 5 TABLET, FILM COATED ORAL at 21:00

## 2024-03-21 RX ADMIN — OXYCODONE HYDROCHLORIDE AND ACETAMINOPHEN 1 TABLET: 10; 325 TABLET ORAL at 10:42

## 2024-03-21 RX ADMIN — INSULIN DETEMIR 40 UNITS: 100 INJECTION, SOLUTION SUBCUTANEOUS at 21:00

## 2024-03-21 RX ADMIN — DICLOFENAC SODIUM 4 G: 10 GEL TOPICAL at 18:33

## 2024-03-21 RX ADMIN — APIXABAN 5 MG: 5 TABLET, FILM COATED ORAL at 08:54

## 2024-03-21 RX ADMIN — IBUPROFEN 400 MG: 400 TABLET ORAL at 11:41

## 2024-03-21 RX ADMIN — EMPAGLIFLOZIN 10 MG: 10 TABLET, FILM COATED ORAL at 08:53

## 2024-03-21 RX ADMIN — INSULIN DETEMIR 40 UNITS: 100 INJECTION, SOLUTION SUBCUTANEOUS at 08:54

## 2024-03-21 RX ADMIN — WHITE PETROLATUM 1 APPLICATION: 1.75 OINTMENT TOPICAL at 09:01

## 2024-03-21 RX ADMIN — Medication 500 MG: at 21:00

## 2024-03-21 RX ADMIN — LISINOPRIL 2.5 MG: 2.5 TABLET ORAL at 08:53

## 2024-03-21 RX ADMIN — Medication 10 MG: at 21:00

## 2024-03-21 RX ADMIN — OXYCODONE HYDROCHLORIDE AND ACETAMINOPHEN 1 TABLET: 10; 325 TABLET ORAL at 23:28

## 2024-03-21 RX ADMIN — ATORVASTATIN CALCIUM 10 MG: 10 TABLET, FILM COATED ORAL at 21:00

## 2024-03-21 RX ADMIN — Medication 500 MG: at 08:54

## 2024-03-21 RX ADMIN — PREGABALIN 100 MG: 100 CAPSULE ORAL at 08:53

## 2024-03-21 RX ADMIN — FERROUS SULFATE TAB 325 MG (65 MG ELEMENTAL FE) 325 MG: 325 (65 FE) TAB at 08:54

## 2024-03-21 RX ADMIN — DICLOFENAC SODIUM 4 G: 10 GEL TOPICAL at 08:55

## 2024-03-21 RX ADMIN — PREGABALIN 100 MG: 100 CAPSULE ORAL at 21:00

## 2024-03-21 NOTE — PLAN OF CARE
Goal Outcome Evaluation:  Plan of Care Reviewed With: patient        Progress: no change  Outcome Evaluation: Alert and oriented x4; able to make needs known to staff. Percocet administered x2; Baclofen x1 for complaints of left hip pain with relief. Zofran given x1 for upset stomach with relief. Continues to refuse turns but assists when being cleaned for incontinent episodes. Uses urinal without difficulty. Call light within reach; care plan ongoing.

## 2024-03-21 NOTE — PLAN OF CARE
Goal Outcome Evaluation:  Plan of Care Reviewed With: patient        Progress: no change  Outcome Evaluation: Given Percocet at 1042 for c/o left hip pain. Med somewhat effective. Given PRN Ibuprofen at 1141 for left shoulder pain. Med effective. Given PRN Baclofen for left hip spasms at 1640. Med effective. No signifncant changes today. Voices needs. Con't current POC

## 2024-03-22 LAB
GLUCOSE BLDC GLUCOMTR-MCNC: 101 MG/DL (ref 70–99)
GLUCOSE BLDC GLUCOMTR-MCNC: 104 MG/DL (ref 70–99)
GLUCOSE BLDC GLUCOMTR-MCNC: 112 MG/DL (ref 70–99)
GLUCOSE BLDC GLUCOMTR-MCNC: 113 MG/DL (ref 70–99)
GLUCOSE BLDC GLUCOMTR-MCNC: 113 MG/DL (ref 70–99)
GLUCOSE BLDC GLUCOMTR-MCNC: 120 MG/DL (ref 70–99)
GLUCOSE BLDC GLUCOMTR-MCNC: 144 MG/DL (ref 70–99)
GLUCOSE BLDC GLUCOMTR-MCNC: 153 MG/DL (ref 70–99)
GLUCOSE BLDC GLUCOMTR-MCNC: 92 MG/DL (ref 70–99)

## 2024-03-22 PROCEDURE — 63710000001 ONDANSETRON ODT 4 MG TABLET DISPERSIBLE: Performed by: INTERNAL MEDICINE

## 2024-03-22 PROCEDURE — 82948 REAGENT STRIP/BLOOD GLUCOSE: CPT | Performed by: INTERNAL MEDICINE

## 2024-03-22 PROCEDURE — 63710000001 INSULIN DETEMIR PER 5 UNITS: Performed by: PHYSICIAN ASSISTANT

## 2024-03-22 PROCEDURE — 63710000001 INSULIN LISPRO (HUMAN) PER 5 UNITS: Performed by: INTERNAL MEDICINE

## 2024-03-22 PROCEDURE — 82948 REAGENT STRIP/BLOOD GLUCOSE: CPT

## 2024-03-22 RX ADMIN — ATORVASTATIN CALCIUM 10 MG: 10 TABLET, FILM COATED ORAL at 21:29

## 2024-03-22 RX ADMIN — DICLOFENAC SODIUM 4 G: 10 GEL TOPICAL at 21:28

## 2024-03-22 RX ADMIN — BACLOFEN 10 MG: 10 TABLET ORAL at 09:49

## 2024-03-22 RX ADMIN — WHITE PETROLATUM 1 APPLICATION: 1.75 OINTMENT TOPICAL at 21:28

## 2024-03-22 RX ADMIN — EMPAGLIFLOZIN 10 MG: 10 TABLET, FILM COATED ORAL at 08:13

## 2024-03-22 RX ADMIN — Medication 500 MG: at 08:13

## 2024-03-22 RX ADMIN — DICLOFENAC SODIUM 4 G: 10 GEL TOPICAL at 11:34

## 2024-03-22 RX ADMIN — INSULIN DETEMIR 40 UNITS: 100 INJECTION, SOLUTION SUBCUTANEOUS at 08:12

## 2024-03-22 RX ADMIN — ONDANSETRON 4 MG: 4 TABLET, ORALLY DISINTEGRATING ORAL at 22:10

## 2024-03-22 RX ADMIN — INSULIN LISPRO 4 UNITS: 100 INJECTION, SOLUTION INTRAVENOUS; SUBCUTANEOUS at 21:28

## 2024-03-22 RX ADMIN — PREGABALIN 100 MG: 100 CAPSULE ORAL at 21:29

## 2024-03-22 RX ADMIN — CYANOCOBALAMIN TAB 500 MCG 1000 MCG: 500 TAB at 08:13

## 2024-03-22 RX ADMIN — WHITE PETROLATUM 1 APPLICATION: 1.75 OINTMENT TOPICAL at 09:50

## 2024-03-22 RX ADMIN — PREGABALIN 100 MG: 100 CAPSULE ORAL at 08:13

## 2024-03-22 RX ADMIN — BACLOFEN 10 MG: 10 TABLET ORAL at 01:31

## 2024-03-22 RX ADMIN — OXYCODONE HYDROCHLORIDE AND ACETAMINOPHEN 1 TABLET: 10; 325 TABLET ORAL at 11:30

## 2024-03-22 RX ADMIN — Medication 500 MG: at 21:29

## 2024-03-22 RX ADMIN — APIXABAN 5 MG: 5 TABLET, FILM COATED ORAL at 21:29

## 2024-03-22 RX ADMIN — IBUPROFEN 400 MG: 400 TABLET ORAL at 01:31

## 2024-03-22 RX ADMIN — Medication 10 MG: at 21:29

## 2024-03-22 RX ADMIN — OXYCODONE HYDROCHLORIDE AND ACETAMINOPHEN 1 TABLET: 10; 325 TABLET ORAL at 21:29

## 2024-03-22 RX ADMIN — APIXABAN 5 MG: 5 TABLET, FILM COATED ORAL at 08:13

## 2024-03-22 RX ADMIN — PREGABALIN 100 MG: 100 CAPSULE ORAL at 16:44

## 2024-03-22 RX ADMIN — FERROUS SULFATE TAB 325 MG (65 MG ELEMENTAL FE) 325 MG: 325 (65 FE) TAB at 08:13

## 2024-03-22 RX ADMIN — SERTRALINE HYDROCHLORIDE 50 MG: 50 TABLET ORAL at 21:29

## 2024-03-22 RX ADMIN — DICLOFENAC SODIUM 4 G: 10 GEL TOPICAL at 18:13

## 2024-03-22 RX ADMIN — LISINOPRIL 2.5 MG: 2.5 TABLET ORAL at 08:13

## 2024-03-22 RX ADMIN — INSULIN DETEMIR 40 UNITS: 100 INJECTION, SOLUTION SUBCUTANEOUS at 21:28

## 2024-03-22 RX ADMIN — DICLOFENAC SODIUM 4 G: 10 GEL TOPICAL at 08:12

## 2024-03-22 RX ADMIN — IBUPROFEN 400 MG: 400 TABLET ORAL at 14:17

## 2024-03-22 RX ADMIN — BACLOFEN 10 MG: 10 TABLET ORAL at 20:37

## 2024-03-22 NOTE — PLAN OF CARE
Goal Outcome Evaluation:  Plan of Care Reviewed With: patient        Progress: no change  Outcome Evaluation: Given PRN Baclofen at 0949 for left hip spasms. Med effective. Given PRN Percocet at 1130 for left hip pain. Med effective. Given PRN Ibuprofen at  1417 for left shoulder pain. Med effective. Educated by nurse about skin breakdown and the risk for pressure areas. Con't to refuse turns and won't allow staff to float heels. Voices needs. Con't current POC.

## 2024-03-22 NOTE — PLAN OF CARE
Goal Outcome Evaluation:  Plan of Care Reviewed With: patient        Progress: no change  Outcome Evaluation: Alert and oriented x4; able to call for assist as needed. Blood sugar 112 at bedtime; insulin administered per MAR. Has received Motrin x1, Baclofen x1 and Percocet x1 for left hip and shoulder pain so far tonight. Call light within reach; care plan ongoing.

## 2024-03-23 PROCEDURE — 63710000001 INSULIN DETEMIR PER 5 UNITS: Performed by: PHYSICIAN ASSISTANT

## 2024-03-23 PROCEDURE — 99308 SBSQ NF CARE LOW MDM 20: CPT | Performed by: PHYSICIAN ASSISTANT

## 2024-03-23 PROCEDURE — 82948 REAGENT STRIP/BLOOD GLUCOSE: CPT

## 2024-03-23 RX ORDER — OXYCODONE AND ACETAMINOPHEN 10; 325 MG/1; MG/1
1 TABLET ORAL EVERY 6 HOURS PRN
Status: DISPENSED | OUTPATIENT
Start: 2024-03-23 | End: 2024-03-28

## 2024-03-23 RX ADMIN — Medication 500 MG: at 20:40

## 2024-03-23 RX ADMIN — WHITE PETROLATUM 1 APPLICATION: 1.75 OINTMENT TOPICAL at 10:32

## 2024-03-23 RX ADMIN — OXYCODONE HYDROCHLORIDE AND ACETAMINOPHEN 1 TABLET: 10; 325 TABLET ORAL at 06:06

## 2024-03-23 RX ADMIN — BACLOFEN 10 MG: 10 TABLET ORAL at 21:52

## 2024-03-23 RX ADMIN — INSULIN DETEMIR 40 UNITS: 100 INJECTION, SOLUTION SUBCUTANEOUS at 08:33

## 2024-03-23 RX ADMIN — WHITE PETROLATUM 1 APPLICATION: 1.75 OINTMENT TOPICAL at 22:11

## 2024-03-23 RX ADMIN — SERTRALINE HYDROCHLORIDE 50 MG: 50 TABLET ORAL at 20:39

## 2024-03-23 RX ADMIN — EMPAGLIFLOZIN 10 MG: 10 TABLET, FILM COATED ORAL at 08:34

## 2024-03-23 RX ADMIN — IBUPROFEN 400 MG: 400 TABLET ORAL at 22:11

## 2024-03-23 RX ADMIN — BACLOFEN 10 MG: 10 TABLET ORAL at 05:35

## 2024-03-23 RX ADMIN — INSULIN DETEMIR 40 UNITS: 100 INJECTION, SOLUTION SUBCUTANEOUS at 20:40

## 2024-03-23 RX ADMIN — APIXABAN 5 MG: 5 TABLET, FILM COATED ORAL at 08:34

## 2024-03-23 RX ADMIN — PREGABALIN 100 MG: 100 CAPSULE ORAL at 08:34

## 2024-03-23 RX ADMIN — ATORVASTATIN CALCIUM 10 MG: 10 TABLET, FILM COATED ORAL at 20:39

## 2024-03-23 RX ADMIN — DICLOFENAC SODIUM 4 G: 10 GEL TOPICAL at 08:36

## 2024-03-23 RX ADMIN — PREGABALIN 100 MG: 100 CAPSULE ORAL at 20:40

## 2024-03-23 RX ADMIN — APIXABAN 5 MG: 5 TABLET, FILM COATED ORAL at 20:39

## 2024-03-23 RX ADMIN — OXYCODONE HYDROCHLORIDE AND ACETAMINOPHEN 1 TABLET: 10; 325 TABLET ORAL at 14:25

## 2024-03-23 RX ADMIN — CYANOCOBALAMIN TAB 500 MCG 1000 MCG: 500 TAB at 08:34

## 2024-03-23 RX ADMIN — LISINOPRIL 2.5 MG: 2.5 TABLET ORAL at 08:34

## 2024-03-23 RX ADMIN — ACETAMINOPHEN 650 MG: 325 TABLET ORAL at 00:43

## 2024-03-23 RX ADMIN — FERROUS SULFATE TAB 325 MG (65 MG ELEMENTAL FE) 325 MG: 325 (65 FE) TAB at 08:34

## 2024-03-23 RX ADMIN — OXYCODONE AND ACETAMINOPHEN 1 TABLET: 10; 325 TABLET ORAL at 22:11

## 2024-03-23 RX ADMIN — Medication 10 MG: at 20:39

## 2024-03-23 RX ADMIN — Medication 500 MG: at 08:34

## 2024-03-23 RX ADMIN — PREGABALIN 100 MG: 100 CAPSULE ORAL at 16:59

## 2024-03-23 RX ADMIN — DICLOFENAC SODIUM 4 G: 10 GEL TOPICAL at 20:41

## 2024-03-23 NOTE — PLAN OF CARE
Goal Outcome Evaluation:  Plan of Care Reviewed With: patient        Progress: no change  Outcome Evaluation: Pt is alert and oriented x4. Continues to refuse bed alarms and turns. Blood sugar 162 at bedtime. Treated with insulin per MAR. PRN baclofen given x1 for spasms. Medication effective. Percocet given x1 for hip pain. Medication effective. Zofran given x1 for c/o of nausea. Medication effective. Tylenol given x1 for headache. Medication effective.  Able to call for assistance. Call light within reach. Continue with current plan of care.

## 2024-03-23 NOTE — PLAN OF CARE
Goal Outcome Evaluation:   Alert and oriented and pleasant with staff. X2 max assist for transfers and ambulation. X1 admin PRN pain medication, with relief of symptoms noted this shift. Provider did discuss with Pt his successful weight loss strategy this shift. Noted improved pain control this shift. No other significant changes noted. Sitting up in bed, call light in reach.

## 2024-03-23 NOTE — PROGRESS NOTES
[facility]   Hospitalist Progress Note  Date: 3/23/2024  Patient Name: Jose Shaikh  : 1960  MRN: [id number]  Date of admission: 2023      Subjective   Subjective     Chief Complaint: Weakness    Summary: Jose Shaikh is a 63 y.o. male  initially hospitalized on 9/10/2023, prolonged hospitalization for treatment of management of generalized weakness, deconditioning, with acute issues of diarrhea, C. difficile negative.  Diarrhea improved.  Had small hematoma after ambulating on his foot.  Unfortunately with exhaustive efforts to try to place this gentleman after 39 days of hospitalization, we are unable to find a facility that will accept him.  He has no further acute needs or requirements for inpatient monitoring and management, his labs and vitals are stable, he is tolerating oral intake, and has been refusing turns and repositionings and other interventions with nursing staff despite recommendations.  Patient discharged in hemodynamically stable condition on 10/19/2023 to follow-up with PCP within 1 week. Unfortunately we cannot solve all of his social issues during this hospitalization due to lack of resources and participation from the patient's perspective despite all our efforts.  Patient has a financial means of making arrangements at home.  Patient appealed his discharge.  Lost his appeal.  LCD: 10/20/23, PFL beginning: 10/21/23 @ 12pm.  Due to an inability to place the patient in a.m. nursing home he has been placed in our skilled nursing facility until arrangements can be made or until he is able to care for himself.      Interval Followup: 3/23/2024    No new issues.  He is tolerating Ozempic.  Has minimal nausea complaints.  Currently weighs 332.  Able to stand briefly.    As he loses weight, he plans on trying to ambulate more.    Glucose controlled   Vital signs stable   Medically stable   Orthopedist consulted and recommending continued weight loss for now       Review of  systems: All systems reviewed and negative except what is noted above in interval follow-up   Objective   Objective     Vitals:   Temp:  [97.7 °F (36.5 °C)-97.8 °F (36.6 °C)] 97.7 °F (36.5 °C)  Heart Rate:  [75-77] 75  Resp:  [16] 16  BP: (109-111)/(53) 109/53    Physical Exam   Constitutional: No distress.  Alert and conversational.  Respiratory: No wheeze noted.  GI: Abdomen: Obese  Neuro/psych:  Calm mood.  No dysarthria.  Extremities: Some lower extremity edema noted    Result Review    Result Review:  I have personally reviewed the results from the time of this admission to 3/23/2024 12:40 EDT and agree with these findings:  [x]  Laboratory  [x]  Microbiology  []  Radiology  []  EKG/Telemetry   []  Cardiology/Vascular   []  Pathology  []  Old records  []  Other:    Assessment & Plan   Assessment / Plan   Assessment:  Hospital-acquired weakness  Influenza A  C. difficile diarrhea treated  Generalized weakness  Deconditioning  DM (Kami Garcia and PCP)  HTN  PAF (on Eliquis; previously seen Dr. 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  Proceed with fluoroscopic guided hip injection.  Also patient would like orthopedic referral for left hip pain.  As he continues to lose weight on Ozempic, he may be a candidate for surgical options  As needed Lasix  Continue basal insulin and SSI per protocol titrate as needed  Continue Eliquis for stroke prophylaxis  Continue probiotics  Discussed with RN    DVT prophylaxis:  Medical DVT prophylaxis orders are present.    CODE STATUS:   Code Status (Patient has  no pulse and is not breathing): CPR (Attempt to Resuscitate)  Medical Interventions (Patient has pulse or is breathing): Full Support

## 2024-03-24 PROCEDURE — 63710000001 INSULIN LISPRO (HUMAN) PER 5 UNITS: Performed by: INTERNAL MEDICINE

## 2024-03-24 PROCEDURE — 82948 REAGENT STRIP/BLOOD GLUCOSE: CPT

## 2024-03-24 PROCEDURE — 63710000001 INSULIN DETEMIR PER 5 UNITS: Performed by: PHYSICIAN ASSISTANT

## 2024-03-24 RX ADMIN — PREGABALIN 100 MG: 100 CAPSULE ORAL at 09:59

## 2024-03-24 RX ADMIN — WHITE PETROLATUM 1 APPLICATION: 1.75 OINTMENT TOPICAL at 10:00

## 2024-03-24 RX ADMIN — CYANOCOBALAMIN TAB 500 MCG 1000 MCG: 500 TAB at 09:59

## 2024-03-24 RX ADMIN — OXYCODONE AND ACETAMINOPHEN 1 TABLET: 10; 325 TABLET ORAL at 20:10

## 2024-03-24 RX ADMIN — DICLOFENAC SODIUM 4 G: 10 GEL TOPICAL at 08:09

## 2024-03-24 RX ADMIN — WHITE PETROLATUM 1 APPLICATION: 1.75 OINTMENT TOPICAL at 21:30

## 2024-03-24 RX ADMIN — INSULIN DETEMIR 40 UNITS: 100 INJECTION, SOLUTION SUBCUTANEOUS at 20:09

## 2024-03-24 RX ADMIN — Medication 10 MG: at 20:09

## 2024-03-24 RX ADMIN — INSULIN LISPRO 4 UNITS: 100 INJECTION, SOLUTION INTRAVENOUS; SUBCUTANEOUS at 20:09

## 2024-03-24 RX ADMIN — INSULIN DETEMIR 40 UNITS: 100 INJECTION, SOLUTION SUBCUTANEOUS at 09:59

## 2024-03-24 RX ADMIN — PREGABALIN 100 MG: 100 CAPSULE ORAL at 15:47

## 2024-03-24 RX ADMIN — OXYCODONE AND ACETAMINOPHEN 1 TABLET: 10; 325 TABLET ORAL at 10:03

## 2024-03-24 RX ADMIN — LISINOPRIL 2.5 MG: 2.5 TABLET ORAL at 09:59

## 2024-03-24 RX ADMIN — DICLOFENAC SODIUM 4 G: 10 GEL TOPICAL at 20:10

## 2024-03-24 RX ADMIN — Medication 500 MG: at 20:09

## 2024-03-24 RX ADMIN — SERTRALINE HYDROCHLORIDE 50 MG: 50 TABLET ORAL at 20:09

## 2024-03-24 RX ADMIN — BACLOFEN 10 MG: 10 TABLET ORAL at 20:10

## 2024-03-24 RX ADMIN — Medication 500 MG: at 09:59

## 2024-03-24 RX ADMIN — EMPAGLIFLOZIN 10 MG: 10 TABLET, FILM COATED ORAL at 09:59

## 2024-03-24 RX ADMIN — ATORVASTATIN CALCIUM 10 MG: 10 TABLET, FILM COATED ORAL at 20:09

## 2024-03-24 RX ADMIN — APIXABAN 5 MG: 5 TABLET, FILM COATED ORAL at 20:09

## 2024-03-24 RX ADMIN — PREGABALIN 100 MG: 100 CAPSULE ORAL at 20:09

## 2024-03-24 RX ADMIN — FERROUS SULFATE TAB 325 MG (65 MG ELEMENTAL FE) 325 MG: 325 (65 FE) TAB at 08:09

## 2024-03-24 RX ADMIN — APIXABAN 5 MG: 5 TABLET, FILM COATED ORAL at 09:59

## 2024-03-24 RX ADMIN — BACLOFEN 10 MG: 10 TABLET ORAL at 06:24

## 2024-03-24 NOTE — PLAN OF CARE
Goal Outcome Evaluation:   Alert and oriented and pleasant with staff. X2 max assist for transfers and ambulation. X1 admin PRN pain medication this shift, with relief of symptoms noted. Continues to show improved blood glucose control. Sitting up in bed, call light in reach.

## 2024-03-24 NOTE — PLAN OF CARE
Goal Outcome Evaluation:  Plan of Care Reviewed With: patient      Pt alert and oriented this shift. Very little rest, awake most of night. Pain med given at bedtime and was somewhat effective. Pt did a good job with bed mobility-able to use bars to help reposition and to roll over as needed.

## 2024-03-25 LAB
GLUCOSE BLDC GLUCOMTR-MCNC: 102 MG/DL (ref 70–99)
GLUCOSE BLDC GLUCOMTR-MCNC: 116 MG/DL (ref 70–99)
GLUCOSE BLDC GLUCOMTR-MCNC: 125 MG/DL (ref 70–99)
GLUCOSE BLDC GLUCOMTR-MCNC: 126 MG/DL (ref 70–99)
GLUCOSE BLDC GLUCOMTR-MCNC: 135 MG/DL (ref 70–99)
GLUCOSE BLDC GLUCOMTR-MCNC: 135 MG/DL (ref 70–99)
GLUCOSE BLDC GLUCOMTR-MCNC: 138 MG/DL (ref 70–99)
GLUCOSE BLDC GLUCOMTR-MCNC: 143 MG/DL (ref 70–99)
GLUCOSE BLDC GLUCOMTR-MCNC: 145 MG/DL (ref 70–99)
GLUCOSE BLDC GLUCOMTR-MCNC: 151 MG/DL (ref 70–99)
GLUCOSE BLDC GLUCOMTR-MCNC: 162 MG/DL (ref 70–99)
GLUCOSE BLDC GLUCOMTR-MCNC: 184 MG/DL (ref 70–99)

## 2024-03-25 PROCEDURE — 82948 REAGENT STRIP/BLOOD GLUCOSE: CPT

## 2024-03-25 PROCEDURE — 63710000001 INSULIN DETEMIR PER 5 UNITS: Performed by: PHYSICIAN ASSISTANT

## 2024-03-25 PROCEDURE — 82948 REAGENT STRIP/BLOOD GLUCOSE: CPT | Performed by: INTERNAL MEDICINE

## 2024-03-25 RX ADMIN — BACLOFEN 10 MG: 10 TABLET ORAL at 15:04

## 2024-03-25 RX ADMIN — DICLOFENAC SODIUM 4 G: 10 GEL TOPICAL at 09:11

## 2024-03-25 RX ADMIN — OXYCODONE AND ACETAMINOPHEN 1 TABLET: 10; 325 TABLET ORAL at 04:45

## 2024-03-25 RX ADMIN — Medication 10 MG: at 20:41

## 2024-03-25 RX ADMIN — EMPAGLIFLOZIN 10 MG: 10 TABLET, FILM COATED ORAL at 09:10

## 2024-03-25 RX ADMIN — OXYCODONE AND ACETAMINOPHEN 1 TABLET: 10; 325 TABLET ORAL at 20:40

## 2024-03-25 RX ADMIN — APIXABAN 5 MG: 5 TABLET, FILM COATED ORAL at 09:10

## 2024-03-25 RX ADMIN — WHITE PETROLATUM 1 APPLICATION: 1.75 OINTMENT TOPICAL at 09:11

## 2024-03-25 RX ADMIN — INSULIN DETEMIR 40 UNITS: 100 INJECTION, SOLUTION SUBCUTANEOUS at 20:42

## 2024-03-25 RX ADMIN — SERTRALINE HYDROCHLORIDE 50 MG: 50 TABLET ORAL at 20:44

## 2024-03-25 RX ADMIN — LISINOPRIL 2.5 MG: 2.5 TABLET ORAL at 09:10

## 2024-03-25 RX ADMIN — BACLOFEN 10 MG: 10 TABLET ORAL at 04:45

## 2024-03-25 RX ADMIN — INSULIN DETEMIR 40 UNITS: 100 INJECTION, SOLUTION SUBCUTANEOUS at 09:10

## 2024-03-25 RX ADMIN — APIXABAN 5 MG: 5 TABLET, FILM COATED ORAL at 20:41

## 2024-03-25 RX ADMIN — PREGABALIN 100 MG: 100 CAPSULE ORAL at 15:51

## 2024-03-25 RX ADMIN — DICLOFENAC SODIUM 4 G: 10 GEL TOPICAL at 18:07

## 2024-03-25 RX ADMIN — IBUPROFEN 400 MG: 400 TABLET ORAL at 02:08

## 2024-03-25 RX ADMIN — PREGABALIN 100 MG: 100 CAPSULE ORAL at 20:40

## 2024-03-25 RX ADMIN — Medication 500 MG: at 09:10

## 2024-03-25 RX ADMIN — OXYCODONE AND ACETAMINOPHEN 1 TABLET: 10; 325 TABLET ORAL at 12:06

## 2024-03-25 RX ADMIN — Medication 500 MG: at 20:40

## 2024-03-25 RX ADMIN — PREGABALIN 100 MG: 100 CAPSULE ORAL at 09:10

## 2024-03-25 RX ADMIN — FERROUS SULFATE TAB 325 MG (65 MG ELEMENTAL FE) 325 MG: 325 (65 FE) TAB at 09:10

## 2024-03-25 RX ADMIN — ATORVASTATIN CALCIUM 10 MG: 10 TABLET, FILM COATED ORAL at 20:38

## 2024-03-25 RX ADMIN — Medication: at 20:41

## 2024-03-25 RX ADMIN — CYANOCOBALAMIN TAB 500 MCG 1000 MCG: 500 TAB at 09:10

## 2024-03-25 NOTE — PLAN OF CARE
Goal Outcome Evaluation:  Plan of Care Reviewed With: patient        Progress: no change  Outcome Evaluation: Alert and oriented x4; able to make needs known to staff. Baclofen x2, Percocet x2, and Motrin x1 administered for complaints of left shoulder and hip pain. Awake most of shift; napped for approx 20min. Continues to refuse turns but assists in turning for incontinent episodes. Utilizes urinal without problems. Call light within reach; care plan ongoing.

## 2024-03-25 NOTE — PLAN OF CARE
Goal Outcome Evaluation:  Plan of Care Reviewed With: patient        Progress: no change  Outcome Evaluation: AOx4, call light and personal items within reach. Able to change postions independently. Bedpan usage and urinal at bedside. C/o hip and shoulder pain (see charting), medication given, pillow support given. Con't montioring bloodsugars (see labs). Skin barrier applied PRN. Con't plan of care

## 2024-03-26 LAB
GLUCOSE BLDC GLUCOMTR-MCNC: 102 MG/DL (ref 70–99)
GLUCOSE BLDC GLUCOMTR-MCNC: 112 MG/DL (ref 70–99)
GLUCOSE BLDC GLUCOMTR-MCNC: 115 MG/DL (ref 70–99)
GLUCOSE BLDC GLUCOMTR-MCNC: 158 MG/DL (ref 70–99)

## 2024-03-26 PROCEDURE — 82948 REAGENT STRIP/BLOOD GLUCOSE: CPT

## 2024-03-26 PROCEDURE — 63710000001 INSULIN DETEMIR PER 5 UNITS: Performed by: PHYSICIAN ASSISTANT

## 2024-03-26 PROCEDURE — 63710000001 ONDANSETRON ODT 4 MG TABLET DISPERSIBLE: Performed by: INTERNAL MEDICINE

## 2024-03-26 PROCEDURE — 82948 REAGENT STRIP/BLOOD GLUCOSE: CPT | Performed by: INTERNAL MEDICINE

## 2024-03-26 PROCEDURE — 63710000001 INSULIN LISPRO (HUMAN) PER 5 UNITS: Performed by: INTERNAL MEDICINE

## 2024-03-26 RX ADMIN — BACLOFEN 10 MG: 10 TABLET ORAL at 00:26

## 2024-03-26 RX ADMIN — PREGABALIN 100 MG: 100 CAPSULE ORAL at 21:07

## 2024-03-26 RX ADMIN — OXYCODONE AND ACETAMINOPHEN 1 TABLET: 10; 325 TABLET ORAL at 21:33

## 2024-03-26 RX ADMIN — APIXABAN 5 MG: 5 TABLET, FILM COATED ORAL at 21:07

## 2024-03-26 RX ADMIN — PREGABALIN 100 MG: 100 CAPSULE ORAL at 16:59

## 2024-03-26 RX ADMIN — WHITE PETROLATUM 1 APPLICATION: 1.75 OINTMENT TOPICAL at 10:45

## 2024-03-26 RX ADMIN — Medication 500 MG: at 21:06

## 2024-03-26 RX ADMIN — OXYCODONE AND ACETAMINOPHEN 1 TABLET: 10; 325 TABLET ORAL at 07:39

## 2024-03-26 RX ADMIN — SERTRALINE HYDROCHLORIDE 50 MG: 50 TABLET ORAL at 21:07

## 2024-03-26 RX ADMIN — EMPAGLIFLOZIN 10 MG: 10 TABLET, FILM COATED ORAL at 09:46

## 2024-03-26 RX ADMIN — ONDANSETRON 4 MG: 4 TABLET, ORALLY DISINTEGRATING ORAL at 00:30

## 2024-03-26 RX ADMIN — APIXABAN 5 MG: 5 TABLET, FILM COATED ORAL at 09:46

## 2024-03-26 RX ADMIN — INSULIN DETEMIR 40 UNITS: 100 INJECTION, SOLUTION SUBCUTANEOUS at 21:06

## 2024-03-26 RX ADMIN — CYANOCOBALAMIN TAB 500 MCG 1000 MCG: 500 TAB at 09:46

## 2024-03-26 RX ADMIN — INSULIN LISPRO 4 UNITS: 100 INJECTION, SOLUTION INTRAVENOUS; SUBCUTANEOUS at 21:05

## 2024-03-26 RX ADMIN — INSULIN DETEMIR 40 UNITS: 100 INJECTION, SOLUTION SUBCUTANEOUS at 09:46

## 2024-03-26 RX ADMIN — Medication 10 MG: at 21:07

## 2024-03-26 RX ADMIN — Medication 500 MG: at 09:46

## 2024-03-26 RX ADMIN — OXYCODONE AND ACETAMINOPHEN 1 TABLET: 10; 325 TABLET ORAL at 15:32

## 2024-03-26 RX ADMIN — PREGABALIN 100 MG: 100 CAPSULE ORAL at 09:46

## 2024-03-26 RX ADMIN — INSULIN LISPRO 4 UNITS: 100 INJECTION, SOLUTION INTRAVENOUS; SUBCUTANEOUS at 11:50

## 2024-03-26 RX ADMIN — IBUPROFEN 400 MG: 400 TABLET ORAL at 03:56

## 2024-03-26 RX ADMIN — ATORVASTATIN CALCIUM 10 MG: 10 TABLET, FILM COATED ORAL at 21:06

## 2024-03-26 RX ADMIN — FERROUS SULFATE TAB 325 MG (65 MG ELEMENTAL FE) 325 MG: 325 (65 FE) TAB at 07:39

## 2024-03-26 RX ADMIN — BACLOFEN 10 MG: 10 TABLET ORAL at 14:33

## 2024-03-26 RX ADMIN — Medication: at 21:33

## 2024-03-26 RX ADMIN — LISINOPRIL 2.5 MG: 2.5 TABLET ORAL at 09:46

## 2024-03-26 RX ADMIN — IBUPROFEN 400 MG: 400 TABLET ORAL at 21:33

## 2024-03-26 NOTE — PLAN OF CARE
Goal Outcome Evaluation:   Alert and oriented and pleasant with staff. X2 max assist for transfers and ambulation. X3 admin PRN pain medication, with relief of symptoms noted. Pt showed improved mobility and endurance this shift. Sitting up in bed call light in reach.

## 2024-03-26 NOTE — PLAN OF CARE
Goal Outcome Evaluation: VSS, pain meds given, see MAR,no change in status. Multiple BM's throughout the night. Use of trapeze bar to turn self. Call light and personal items at bedside within reach.

## 2024-03-27 LAB
GLUCOSE BLDC GLUCOMTR-MCNC: 110 MG/DL (ref 70–99)
GLUCOSE BLDC GLUCOMTR-MCNC: 126 MG/DL (ref 70–99)
GLUCOSE BLDC GLUCOMTR-MCNC: 138 MG/DL (ref 70–99)
GLUCOSE BLDC GLUCOMTR-MCNC: 153 MG/DL (ref 70–99)
GLUCOSE BLDC GLUCOMTR-MCNC: 156 MG/DL (ref 70–99)

## 2024-03-27 PROCEDURE — 82948 REAGENT STRIP/BLOOD GLUCOSE: CPT

## 2024-03-27 PROCEDURE — 82948 REAGENT STRIP/BLOOD GLUCOSE: CPT | Performed by: INTERNAL MEDICINE

## 2024-03-27 PROCEDURE — 63710000001 INSULIN DETEMIR PER 5 UNITS: Performed by: PHYSICIAN ASSISTANT

## 2024-03-27 PROCEDURE — 63710000001 INSULIN LISPRO (HUMAN) PER 5 UNITS: Performed by: INTERNAL MEDICINE

## 2024-03-27 RX ADMIN — Medication 10 MG: at 20:54

## 2024-03-27 RX ADMIN — INSULIN DETEMIR 40 UNITS: 100 INJECTION, SOLUTION SUBCUTANEOUS at 20:54

## 2024-03-27 RX ADMIN — BACLOFEN 10 MG: 10 TABLET ORAL at 04:09

## 2024-03-27 RX ADMIN — Medication 500 MG: at 20:54

## 2024-03-27 RX ADMIN — WHITE PETROLATUM 1 APPLICATION: 1.75 OINTMENT TOPICAL at 10:18

## 2024-03-27 RX ADMIN — OXYCODONE AND ACETAMINOPHEN 1 TABLET: 10; 325 TABLET ORAL at 12:56

## 2024-03-27 RX ADMIN — WHITE PETROLATUM 1 APPLICATION: 1.75 OINTMENT TOPICAL at 21:41

## 2024-03-27 RX ADMIN — WHITE PETROLATUM 1 APPLICATION: 1.75 OINTMENT TOPICAL at 00:03

## 2024-03-27 RX ADMIN — EMPAGLIFLOZIN 10 MG: 10 TABLET, FILM COATED ORAL at 09:01

## 2024-03-27 RX ADMIN — APIXABAN 5 MG: 5 TABLET, FILM COATED ORAL at 20:54

## 2024-03-27 RX ADMIN — FERROUS SULFATE TAB 325 MG (65 MG ELEMENTAL FE) 325 MG: 325 (65 FE) TAB at 08:14

## 2024-03-27 RX ADMIN — SIMETHICONE 80 MG: 80 TABLET, CHEWABLE ORAL at 03:13

## 2024-03-27 RX ADMIN — SERTRALINE HYDROCHLORIDE 50 MG: 50 TABLET ORAL at 20:54

## 2024-03-27 RX ADMIN — INSULIN DETEMIR 40 UNITS: 100 INJECTION, SOLUTION SUBCUTANEOUS at 09:01

## 2024-03-27 RX ADMIN — Medication 500 MG: at 09:01

## 2024-03-27 RX ADMIN — INSULIN LISPRO 4 UNITS: 100 INJECTION, SOLUTION INTRAVENOUS; SUBCUTANEOUS at 12:04

## 2024-03-27 RX ADMIN — CYANOCOBALAMIN TAB 500 MCG 1000 MCG: 500 TAB at 09:01

## 2024-03-27 RX ADMIN — DICLOFENAC SODIUM 4 G: 10 GEL TOPICAL at 21:41

## 2024-03-27 RX ADMIN — DICLOFENAC SODIUM 4 G: 10 GEL TOPICAL at 00:02

## 2024-03-27 RX ADMIN — OXYCODONE AND ACETAMINOPHEN 1 TABLET: 10; 325 TABLET ORAL at 04:09

## 2024-03-27 RX ADMIN — BACLOFEN 10 MG: 10 TABLET ORAL at 21:41

## 2024-03-27 RX ADMIN — ATORVASTATIN CALCIUM 10 MG: 10 TABLET, FILM COATED ORAL at 20:54

## 2024-03-27 RX ADMIN — OXYCODONE AND ACETAMINOPHEN 1 TABLET: 10; 325 TABLET ORAL at 20:56

## 2024-03-27 RX ADMIN — PREGABALIN 100 MG: 100 CAPSULE ORAL at 20:54

## 2024-03-27 RX ADMIN — PREGABALIN 100 MG: 100 CAPSULE ORAL at 09:01

## 2024-03-27 RX ADMIN — PREGABALIN 100 MG: 100 CAPSULE ORAL at 16:26

## 2024-03-27 RX ADMIN — APIXABAN 5 MG: 5 TABLET, FILM COATED ORAL at 09:01

## 2024-03-27 NOTE — PLAN OF CARE
Goal Outcome Evaluation: Alert and oriented X4. Pleasant with staff. X2 max assist for transfers.  Makes needs known, pain meds for chronic pain. R lower extremity appears more red, will pass on to day shift nurse.

## 2024-03-27 NOTE — PLAN OF CARE
Goal Outcome Evaluation:   Alert and oriented and pleasant with staff. X2 max assist for transfers and ambulation. X1 admin PRN pain medication, with relief of symptoms noted. No other significant changes noted this shift. Sitting up in bed, call light in reach.

## 2024-03-28 LAB
GLUCOSE BLDC GLUCOMTR-MCNC: 114 MG/DL (ref 70–99)
GLUCOSE BLDC GLUCOMTR-MCNC: 123 MG/DL (ref 70–99)
GLUCOSE BLDC GLUCOMTR-MCNC: 130 MG/DL (ref 70–99)
GLUCOSE BLDC GLUCOMTR-MCNC: 139 MG/DL (ref 70–99)

## 2024-03-28 PROCEDURE — 82948 REAGENT STRIP/BLOOD GLUCOSE: CPT

## 2024-03-28 PROCEDURE — 82948 REAGENT STRIP/BLOOD GLUCOSE: CPT | Performed by: INTERNAL MEDICINE

## 2024-03-28 PROCEDURE — 63710000001 INSULIN DETEMIR PER 5 UNITS: Performed by: PHYSICIAN ASSISTANT

## 2024-03-28 RX ORDER — OXYCODONE HYDROCHLORIDE 5 MG/1
5 TABLET ORAL EVERY 8 HOURS PRN
Status: DISCONTINUED | OUTPATIENT
Start: 2024-03-28 | End: 2024-03-28

## 2024-03-28 RX ORDER — OXYCODONE AND ACETAMINOPHEN 10; 325 MG/1; MG/1
1 TABLET ORAL EVERY 8 HOURS PRN
Status: DISPENSED | OUTPATIENT
Start: 2024-03-28 | End: 2024-04-02

## 2024-03-28 RX ORDER — OXYCODONE HYDROCHLORIDE 5 MG/1
5 TABLET ORAL EVERY 8 HOURS PRN
Status: DISCONTINUED | OUTPATIENT
Start: 2024-03-28 | End: 2024-04-06

## 2024-03-28 RX ADMIN — BACLOFEN 10 MG: 10 TABLET ORAL at 15:54

## 2024-03-28 RX ADMIN — PREGABALIN 100 MG: 100 CAPSULE ORAL at 09:18

## 2024-03-28 RX ADMIN — INSULIN DETEMIR 40 UNITS: 100 INJECTION, SOLUTION SUBCUTANEOUS at 21:26

## 2024-03-28 RX ADMIN — Medication 500 MG: at 09:17

## 2024-03-28 RX ADMIN — LISINOPRIL 2.5 MG: 2.5 TABLET ORAL at 09:17

## 2024-03-28 RX ADMIN — INSULIN DETEMIR 40 UNITS: 100 INJECTION, SOLUTION SUBCUTANEOUS at 09:17

## 2024-03-28 RX ADMIN — FERROUS SULFATE TAB 325 MG (65 MG ELEMENTAL FE) 325 MG: 325 (65 FE) TAB at 09:18

## 2024-03-28 RX ADMIN — APIXABAN 5 MG: 5 TABLET, FILM COATED ORAL at 09:18

## 2024-03-28 RX ADMIN — WHITE PETROLATUM 1 APPLICATION: 1.75 OINTMENT TOPICAL at 21:26

## 2024-03-28 RX ADMIN — Medication 10 MG: at 21:27

## 2024-03-28 RX ADMIN — PREGABALIN 100 MG: 100 CAPSULE ORAL at 15:50

## 2024-03-28 RX ADMIN — APIXABAN 5 MG: 5 TABLET, FILM COATED ORAL at 21:27

## 2024-03-28 RX ADMIN — DICLOFENAC SODIUM 4 G: 10 GEL TOPICAL at 09:18

## 2024-03-28 RX ADMIN — Medication 500 MG: at 21:27

## 2024-03-28 RX ADMIN — IBUPROFEN 400 MG: 400 TABLET ORAL at 15:50

## 2024-03-28 RX ADMIN — CYANOCOBALAMIN TAB 500 MCG 1000 MCG: 500 TAB at 09:17

## 2024-03-28 RX ADMIN — ATORVASTATIN CALCIUM 10 MG: 10 TABLET, FILM COATED ORAL at 21:27

## 2024-03-28 RX ADMIN — SERTRALINE HYDROCHLORIDE 50 MG: 50 TABLET ORAL at 21:27

## 2024-03-28 RX ADMIN — EMPAGLIFLOZIN 10 MG: 10 TABLET, FILM COATED ORAL at 09:17

## 2024-03-28 RX ADMIN — BACLOFEN 10 MG: 10 TABLET ORAL at 06:13

## 2024-03-28 RX ADMIN — OXYCODONE AND ACETAMINOPHEN 1 TABLET: 10; 325 TABLET ORAL at 03:57

## 2024-03-28 RX ADMIN — OXYCODONE AND ACETAMINOPHEN 1 TABLET: 10; 325 TABLET ORAL at 11:53

## 2024-03-28 RX ADMIN — WHITE PETROLATUM 1 APPLICATION: 1.75 OINTMENT TOPICAL at 09:19

## 2024-03-28 RX ADMIN — PREGABALIN 100 MG: 100 CAPSULE ORAL at 21:27

## 2024-03-28 RX ADMIN — IBUPROFEN 400 MG: 400 TABLET ORAL at 03:51

## 2024-03-28 NOTE — PLAN OF CARE
Goal Outcome Evaluation:  Plan of Care Reviewed With: patient           Outcome Evaluation: PT is AAOx4, VSS, remains on SSI, wound care completed as ordered with the exception of gluteal, pt declined this shift, PRN meds given as ordered for chronic pain (see MAR), education provided to patient regarding elevating heels to prevent PI and turning q2h,  education provided about increasing activity and attempting to use BSC rather than bedpan to assist with therapy progression, last BM noted 3/28/24, no c/o of SOA, N/V/D or acute pain, bed in low/locked position, bedside table and call light within reach, able to make needs known,  no acute events this shift, continue with current POC at this time.

## 2024-03-28 NOTE — PLAN OF CARE
Goal Outcome Evaluation:  Plan of Care Reviewed With: patient AOX4, makes needs known, call light and personal   belongings at bedside within reach. Pt has lost 40 lbs now Pt states surgeon to reevaluate for possible hip surgery.

## 2024-03-28 NOTE — SIGNIFICANT NOTE
Wound Eval / Progress Noted    KEO Dominguez     Patient Name: Jose Shaikh  : 1960  MRN: [id number]  Today's Date: 3/28/2024                 Admit Date: 2023    Visit Dx:    ICD-10-CM ICD-9-CM   1. Difficulty in walking  R26.2 719.7   2. Type 2 diabetes mellitus with other specified complication, unspecified whether long term insulin use  E11.69 250.80         Debility    Ulcer of right foot    Ulcerated, foot, left, limited to breakdown of skin    Onychomycosis        Past Medical History:   Diagnosis Date    Absence of toe of right foot     Acute osteomyelitis of left calcaneus  2021    Anxiety and depression     Arthritis     Cancer     Chronic pain     STATES HIS PAIN IS 10/10 AAT    Claustrophobia     Corns and callus     Diabetic ulcer of left heel associated with type 2 DM 2021    Diabetic ulcer of left heel associated with type 2 DM 2021    Diabetic ulcer of right midfoot associated with type 2 DM 2021    Difficulty walking     Essential hypertension 2021    Hammertoe     Hyperlipidemia LDL goal <100 2021    Ingrown toenail     Obesity     Paroxysmal atrial fibrillation 2021    Polyneuropathy     Pressure ulcer, stage 1     Tinea unguium     Type 2 diabetes mellitus with polyneuropathy         Past Surgical History:   Procedure Laterality Date    CYST REMOVAL      center of back; benign    EYE SURGERY      INCISION AND DRAINAGE ABSCESS      back    INCISION AND DRAINAGE LEG Right 12/10/2021    Procedure: INCISION AND DRAINAGE LOWER EXTREMITY;  Surgeon: Ash Leyva DPM;  Location: [facility] MAIN OR;  Service: Podiatry;  Laterality: Right;    OTHER SURGICAL HISTORY      Surgical clips left foot    TOE SURGERY Right     Removal of 5th toe    TRANS METATARSAL AMPUTATION Right 2021    Procedure: AMPUTATION TRANS METATARSAL;  Surgeon: Ash Leyva DPM;  Location: [facility] MAIN OR;  Service: Podiatry;  Laterality: Right;    VASCULAR  SURGERY      WRIST SURGERY Left     repair of injury         Physical Assessment:     03/28/24 1225   Wound 01/22/24 1343 Left anterior fourth toe Abrasion   Placement Date/Time: 01/22/24 1343   Present on Original Admission: No  Side: Left  Orientation: anterior  Location: fourth toe  Primary Wound Type: Abrasion   Wound Image    Dressing Appearance open to air   Closure None   Base dry;necrotic   Periwound intact;pink   Periwound Temperature warm   Periwound Skin Turgor soft   Edges rolled/closed   Wound Length (cm) 0.3 cm   Wound Width (cm) 0.3 cm   Wound Surface Area (cm^2) 0.09 cm^2   Drainage Amount none   Care, Wound cleansed with;sterile normal saline   Dressing Care open to air   Wound 03/27/24 1045 Right anterior Skin Tear   Placement Date/Time: 03/27/24 1045   Side: Right  Orientation: anterior  Primary Wound Type: Skin Tear   Wound Image    Dressing Appearance intact;moist drainage   Closure None   Base red;moist;purple   Periwound blanchable;pink   Periwound Temperature warm   Periwound Skin Turgor soft   Edges open   Wound Length (cm) 1.8 cm  (medial aspect, lateral aspect 1.1cm)   Wound Width (cm) 2.7 cm  (medial aspect, lateral aspect 1.3cm)   Wound Depth (cm) 0.1 cm   Wound Surface Area (cm^2) 4.86 cm^2   Wound Volume (cm^3) 0.486 cm^3   Drainage Characteristics/Odor serous;serosanguineous   Drainage Amount scant   Care, Wound cleansed with;sterile normal saline   Dressing Care dressing applied;border dressing;non-adherent;petroleum-based;silicone   Periwound Care absorptive dressing applied   Wound 03/28/24 1225 Right medial ankle Traumatic   Placement Date/Time: 03/28/24 1225   Side: Right  Orientation: medial  Location: ankle  Primary Wound Type: Traumatic   Wound Image    Dressing Appearance intact;moist drainage   Closure None   Base moist;red   Red (%), Wound Tissue Color 100   Periwound intact;pink   Periwound Temperature warm   Periwound Skin Turgor soft   Edges open   Wound Length (cm) 1.7  cm   Wound Width (cm) 1.6 cm   Wound Depth (cm) 0.1 cm   Wound Surface Area (cm^2) 2.72 cm^2   Wound Volume (cm^3) 0.272 cm^3   Drainage Characteristics/Odor serosanguineous   Drainage Amount small   Care, Wound cleansed with;sterile normal saline   Dressing Care dressing applied;border dressing;non-adherent;petroleum-based;silicone   Periwound Care absorptive dressing applied          Wound Check / Follow-up:  Patient seen today for wound follow-up. Patient is sitting in bed, awake, alert and oriented. He is agreeable to assessment but only allows assessment to lower extremities at this time.     Patient with wound to left 4th toe that has been present for last several assessments. Currently area is dry with dark discoloration of tissue. No open tissue and no drainage. Cleansed with NS and gauze, recommending to continue skin care / hygiene.     Patient with new open areas to right foot. There is one to right medial ankle area as well as two to right distal foot aspect. The lateral distal foot wound is moist with superficial tissue loss but has a darker discoloration with some serous fluid under remaining surface. Remainder of wounds are red and moist with superficial tissue loss. Cleansed all with NS and gauze. Recommending daily dressing with non-adherent petroleum based gauze and silicone border dressing.     Patient has dry scaly skin to BLE with some chronic discoloration in place. Skin care / hygiene recommended.     While patient is declining further assessment at this time, discussed with patient that I recommend ongoing skin care / hygiene and pressure reduction. He verbalized understanding.     Impression: left 4th toe wound; Right foot traumatic injuries. Dry flaky skin,     Short term goals:  Regain skin integrity. Daily dressing changes. Skin care / hygiene.     Bettye Crews RN    3/28/2024    14:37 EDT

## 2024-03-29 LAB
GLUCOSE BLDC GLUCOMTR-MCNC: 115 MG/DL (ref 70–99)
GLUCOSE BLDC GLUCOMTR-MCNC: 117 MG/DL (ref 70–99)
GLUCOSE BLDC GLUCOMTR-MCNC: 121 MG/DL (ref 70–99)
GLUCOSE BLDC GLUCOMTR-MCNC: 124 MG/DL (ref 70–99)
GLUCOSE BLDC GLUCOMTR-MCNC: 149 MG/DL (ref 70–99)
GLUCOSE BLDC GLUCOMTR-MCNC: 155 MG/DL (ref 70–99)

## 2024-03-29 PROCEDURE — 82948 REAGENT STRIP/BLOOD GLUCOSE: CPT

## 2024-03-29 PROCEDURE — 82948 REAGENT STRIP/BLOOD GLUCOSE: CPT | Performed by: INTERNAL MEDICINE

## 2024-03-29 PROCEDURE — 63710000001 INSULIN DETEMIR PER 5 UNITS: Performed by: PHYSICIAN ASSISTANT

## 2024-03-29 PROCEDURE — 63710000001 INSULIN LISPRO (HUMAN) PER 5 UNITS: Performed by: INTERNAL MEDICINE

## 2024-03-29 RX ADMIN — PREGABALIN 100 MG: 100 CAPSULE ORAL at 21:27

## 2024-03-29 RX ADMIN — INSULIN DETEMIR 40 UNITS: 100 INJECTION, SOLUTION SUBCUTANEOUS at 21:26

## 2024-03-29 RX ADMIN — ATORVASTATIN CALCIUM 10 MG: 10 TABLET, FILM COATED ORAL at 21:27

## 2024-03-29 RX ADMIN — INSULIN LISPRO 4 UNITS: 100 INJECTION, SOLUTION INTRAVENOUS; SUBCUTANEOUS at 21:26

## 2024-03-29 RX ADMIN — WHITE PETROLATUM 1 APPLICATION: 1.75 OINTMENT TOPICAL at 21:26

## 2024-03-29 RX ADMIN — OXYCODONE AND ACETAMINOPHEN 1 TABLET: 10; 325 TABLET ORAL at 08:07

## 2024-03-29 RX ADMIN — DICLOFENAC SODIUM 4 G: 10 GEL TOPICAL at 00:36

## 2024-03-29 RX ADMIN — SERTRALINE HYDROCHLORIDE 50 MG: 50 TABLET ORAL at 21:27

## 2024-03-29 RX ADMIN — DICLOFENAC SODIUM 4 G: 10 GEL TOPICAL at 21:26

## 2024-03-29 RX ADMIN — IBUPROFEN 400 MG: 400 TABLET ORAL at 14:56

## 2024-03-29 RX ADMIN — OXYCODONE AND ACETAMINOPHEN 1 TABLET: 10; 325 TABLET ORAL at 00:36

## 2024-03-29 RX ADMIN — CYANOCOBALAMIN TAB 500 MCG 1000 MCG: 500 TAB at 09:02

## 2024-03-29 RX ADMIN — FERROUS SULFATE TAB 325 MG (65 MG ELEMENTAL FE) 325 MG: 325 (65 FE) TAB at 09:02

## 2024-03-29 RX ADMIN — BACLOFEN 10 MG: 10 TABLET ORAL at 00:36

## 2024-03-29 RX ADMIN — BACLOFEN 10 MG: 10 TABLET ORAL at 08:07

## 2024-03-29 RX ADMIN — SIMETHICONE 80 MG: 80 TABLET, CHEWABLE ORAL at 23:28

## 2024-03-29 RX ADMIN — EMPAGLIFLOZIN 10 MG: 10 TABLET, FILM COATED ORAL at 09:01

## 2024-03-29 RX ADMIN — Medication 10 MG: at 21:27

## 2024-03-29 RX ADMIN — BACLOFEN 10 MG: 10 TABLET ORAL at 17:40

## 2024-03-29 RX ADMIN — DICLOFENAC SODIUM 4 G: 10 GEL TOPICAL at 17:41

## 2024-03-29 RX ADMIN — OXYCODONE AND ACETAMINOPHEN 1 TABLET: 10; 325 TABLET ORAL at 17:40

## 2024-03-29 RX ADMIN — DICLOFENAC SODIUM 4 G: 10 GEL TOPICAL at 12:27

## 2024-03-29 RX ADMIN — INSULIN DETEMIR 40 UNITS: 100 INJECTION, SOLUTION SUBCUTANEOUS at 09:19

## 2024-03-29 RX ADMIN — PREGABALIN 100 MG: 100 CAPSULE ORAL at 09:02

## 2024-03-29 RX ADMIN — APIXABAN 5 MG: 5 TABLET, FILM COATED ORAL at 09:01

## 2024-03-29 RX ADMIN — Medication 500 MG: at 09:01

## 2024-03-29 RX ADMIN — LISINOPRIL 2.5 MG: 2.5 TABLET ORAL at 09:02

## 2024-03-29 RX ADMIN — PREGABALIN 100 MG: 100 CAPSULE ORAL at 15:52

## 2024-03-29 RX ADMIN — APIXABAN 5 MG: 5 TABLET, FILM COATED ORAL at 21:27

## 2024-03-29 RX ADMIN — Medication 500 MG: at 21:27

## 2024-03-29 NOTE — PLAN OF CARE
Goal Outcome Evaluation:  Plan of Care Reviewed With: patient        Progress: no change  Outcome Evaluation: Alert and oriented x4; able to call for assist as needed. Continues to refuse turns but has used trapeze bar to assist for incontinence skin care frequently throughout the night. Medicated for left shoulder and left hip pain with Percocet and Baclofen. Blood sugar 115; scheduled Levemir administered per MAR. Received multiple snacks throughout the shift per request. Call light within reach; care plan ongoing.

## 2024-03-29 NOTE — PLAN OF CARE
Goal Outcome Evaluation:  Plan of Care Reviewed With: patient        Progress: improving         Patient is alert and oriented x4 and able to make needs known to staff. r Medicated for left shoulder and left hip pain with Percocet and Baclofen. Dressing to right ankle/foot changed per order. Abdomen dressing changed per order. Call light within reach. Will continue with current care plan.

## 2024-03-30 LAB
GLUCOSE BLDC GLUCOMTR-MCNC: 123 MG/DL (ref 70–99)
GLUCOSE BLDC GLUCOMTR-MCNC: 132 MG/DL (ref 70–99)
GLUCOSE BLDC GLUCOMTR-MCNC: 152 MG/DL (ref 70–99)
GLUCOSE BLDC GLUCOMTR-MCNC: 173 MG/DL (ref 70–99)

## 2024-03-30 PROCEDURE — 63710000001 INSULIN DETEMIR PER 5 UNITS: Performed by: PHYSICIAN ASSISTANT

## 2024-03-30 PROCEDURE — 82948 REAGENT STRIP/BLOOD GLUCOSE: CPT | Performed by: INTERNAL MEDICINE

## 2024-03-30 PROCEDURE — 63710000001 INSULIN LISPRO (HUMAN) PER 5 UNITS: Performed by: INTERNAL MEDICINE

## 2024-03-30 PROCEDURE — 82948 REAGENT STRIP/BLOOD GLUCOSE: CPT

## 2024-03-30 RX ADMIN — ATORVASTATIN CALCIUM 10 MG: 10 TABLET, FILM COATED ORAL at 20:48

## 2024-03-30 RX ADMIN — BACLOFEN 10 MG: 10 TABLET ORAL at 10:36

## 2024-03-30 RX ADMIN — SERTRALINE HYDROCHLORIDE 50 MG: 50 TABLET ORAL at 20:49

## 2024-03-30 RX ADMIN — Medication 10 MG: at 20:48

## 2024-03-30 RX ADMIN — IBUPROFEN 400 MG: 400 TABLET ORAL at 06:06

## 2024-03-30 RX ADMIN — LISINOPRIL 2.5 MG: 2.5 TABLET ORAL at 08:35

## 2024-03-30 RX ADMIN — OXYCODONE AND ACETAMINOPHEN 1 TABLET: 10; 325 TABLET ORAL at 01:54

## 2024-03-30 RX ADMIN — Medication 500 MG: at 20:49

## 2024-03-30 RX ADMIN — EMPAGLIFLOZIN 10 MG: 10 TABLET, FILM COATED ORAL at 08:35

## 2024-03-30 RX ADMIN — INSULIN LISPRO 4 UNITS: 100 INJECTION, SOLUTION INTRAVENOUS; SUBCUTANEOUS at 12:11

## 2024-03-30 RX ADMIN — INSULIN DETEMIR 40 UNITS: 100 INJECTION, SOLUTION SUBCUTANEOUS at 20:47

## 2024-03-30 RX ADMIN — OXYCODONE AND ACETAMINOPHEN 1 TABLET: 10; 325 TABLET ORAL at 10:36

## 2024-03-30 RX ADMIN — DICLOFENAC SODIUM 4 G: 10 GEL TOPICAL at 12:11

## 2024-03-30 RX ADMIN — CYANOCOBALAMIN TAB 500 MCG 1000 MCG: 500 TAB at 08:35

## 2024-03-30 RX ADMIN — FERROUS SULFATE TAB 325 MG (65 MG ELEMENTAL FE) 325 MG: 325 (65 FE) TAB at 08:35

## 2024-03-30 RX ADMIN — INSULIN DETEMIR 40 UNITS: 100 INJECTION, SOLUTION SUBCUTANEOUS at 08:43

## 2024-03-30 RX ADMIN — PREGABALIN 100 MG: 100 CAPSULE ORAL at 20:48

## 2024-03-30 RX ADMIN — APIXABAN 5 MG: 5 TABLET, FILM COATED ORAL at 20:49

## 2024-03-30 RX ADMIN — WHITE PETROLATUM 1 APPLICATION: 1.75 OINTMENT TOPICAL at 11:00

## 2024-03-30 RX ADMIN — WHITE PETROLATUM 1 APPLICATION: 1.75 OINTMENT TOPICAL at 21:40

## 2024-03-30 RX ADMIN — DICLOFENAC SODIUM 4 G: 10 GEL TOPICAL at 08:36

## 2024-03-30 RX ADMIN — Medication 500 MG: at 08:35

## 2024-03-30 RX ADMIN — PREGABALIN 100 MG: 100 CAPSULE ORAL at 08:35

## 2024-03-30 RX ADMIN — BACLOFEN 10 MG: 10 TABLET ORAL at 20:48

## 2024-03-30 RX ADMIN — APIXABAN 5 MG: 5 TABLET, FILM COATED ORAL at 08:35

## 2024-03-30 RX ADMIN — INSULIN LISPRO 4 UNITS: 100 INJECTION, SOLUTION INTRAVENOUS; SUBCUTANEOUS at 20:47

## 2024-03-30 RX ADMIN — PREGABALIN 100 MG: 100 CAPSULE ORAL at 17:00

## 2024-03-30 RX ADMIN — OXYCODONE AND ACETAMINOPHEN 1 TABLET: 10; 325 TABLET ORAL at 20:48

## 2024-03-30 RX ADMIN — BACLOFEN 10 MG: 10 TABLET ORAL at 01:54

## 2024-03-30 NOTE — PLAN OF CARE
Goal Outcome Evaluation:  Plan of Care Reviewed With: patient        Progress: no change  Outcome Evaluation: Patient is alert and oriented. Given PRN Baclofen and Percocet at 1036. Meds effective. Refused scheduled voltaren at 1800. Stated shoulder and arm are not bothering him. Voices needs. Con't current POC.

## 2024-03-30 NOTE — PLAN OF CARE
Goal Outcome Evaluation:  Plan of Care Reviewed With: patient        Progress: improving  Outcome Evaluation: Pt is alert and oriented x4 and is able make needs known. Blood sugar was 155  and scheduled insulin given per MAR. Continues to refuse turns. Used bedpan for multiple bowel movements during shift. Pt uses trapeze bar to help roll from side to side for cleanup Baclofen and Percocet given for hip pain x1 at 0154. Medications both effective. Simethicone given x1 for gas. Medication also effective. Call light and belongings within reach. Continue current plan of care

## 2024-03-31 LAB
GLUCOSE BLDC GLUCOMTR-MCNC: 111 MG/DL (ref 70–99)
GLUCOSE BLDC GLUCOMTR-MCNC: 121 MG/DL (ref 70–99)
GLUCOSE BLDC GLUCOMTR-MCNC: 159 MG/DL (ref 70–99)
GLUCOSE BLDC GLUCOMTR-MCNC: 84 MG/DL (ref 70–99)

## 2024-03-31 PROCEDURE — 63710000001 INSULIN DETEMIR PER 5 UNITS: Performed by: PHYSICIAN ASSISTANT

## 2024-03-31 PROCEDURE — 82948 REAGENT STRIP/BLOOD GLUCOSE: CPT | Performed by: INTERNAL MEDICINE

## 2024-03-31 PROCEDURE — 82948 REAGENT STRIP/BLOOD GLUCOSE: CPT

## 2024-03-31 PROCEDURE — 63710000001 INSULIN LISPRO (HUMAN) PER 5 UNITS: Performed by: INTERNAL MEDICINE

## 2024-03-31 RX ADMIN — PREGABALIN 100 MG: 100 CAPSULE ORAL at 16:52

## 2024-03-31 RX ADMIN — CYANOCOBALAMIN TAB 500 MCG 1000 MCG: 500 TAB at 09:10

## 2024-03-31 RX ADMIN — INSULIN LISPRO 4 UNITS: 100 INJECTION, SOLUTION INTRAVENOUS; SUBCUTANEOUS at 13:01

## 2024-03-31 RX ADMIN — LISINOPRIL 2.5 MG: 2.5 TABLET ORAL at 09:10

## 2024-03-31 RX ADMIN — DICLOFENAC SODIUM 4 G: 10 GEL TOPICAL at 09:13

## 2024-03-31 RX ADMIN — EMPAGLIFLOZIN 10 MG: 10 TABLET, FILM COATED ORAL at 09:11

## 2024-03-31 RX ADMIN — OXYCODONE AND ACETAMINOPHEN 1 TABLET: 10; 325 TABLET ORAL at 22:12

## 2024-03-31 RX ADMIN — FERROUS SULFATE TAB 325 MG (65 MG ELEMENTAL FE) 325 MG: 325 (65 FE) TAB at 09:10

## 2024-03-31 RX ADMIN — ATORVASTATIN CALCIUM 10 MG: 10 TABLET, FILM COATED ORAL at 20:07

## 2024-03-31 RX ADMIN — BACLOFEN 10 MG: 10 TABLET ORAL at 13:36

## 2024-03-31 RX ADMIN — PREGABALIN 100 MG: 100 CAPSULE ORAL at 20:07

## 2024-03-31 RX ADMIN — OXYCODONE AND ACETAMINOPHEN 1 TABLET: 10; 325 TABLET ORAL at 05:00

## 2024-03-31 RX ADMIN — SERTRALINE HYDROCHLORIDE 50 MG: 50 TABLET ORAL at 20:07

## 2024-03-31 RX ADMIN — Medication 10 MG: at 20:07

## 2024-03-31 RX ADMIN — INSULIN DETEMIR 40 UNITS: 100 INJECTION, SOLUTION SUBCUTANEOUS at 09:12

## 2024-03-31 RX ADMIN — PREGABALIN 100 MG: 100 CAPSULE ORAL at 09:10

## 2024-03-31 RX ADMIN — INSULIN DETEMIR 40 UNITS: 100 INJECTION, SOLUTION SUBCUTANEOUS at 20:06

## 2024-03-31 RX ADMIN — BACLOFEN 10 MG: 10 TABLET ORAL at 22:12

## 2024-03-31 RX ADMIN — APIXABAN 5 MG: 5 TABLET, FILM COATED ORAL at 09:10

## 2024-03-31 RX ADMIN — WHITE PETROLATUM 1 APPLICATION: 1.75 OINTMENT TOPICAL at 13:03

## 2024-03-31 RX ADMIN — WHITE PETROLATUM 1 APPLICATION: 1.75 OINTMENT TOPICAL at 22:12

## 2024-03-31 RX ADMIN — OXYCODONE AND ACETAMINOPHEN 1 TABLET: 10; 325 TABLET ORAL at 13:36

## 2024-03-31 RX ADMIN — APIXABAN 5 MG: 5 TABLET, FILM COATED ORAL at 20:07

## 2024-03-31 RX ADMIN — Medication 500 MG: at 20:07

## 2024-03-31 RX ADMIN — Medication 500 MG: at 09:11

## 2024-03-31 RX ADMIN — IBUPROFEN 400 MG: 400 TABLET ORAL at 05:49

## 2024-03-31 RX ADMIN — BACLOFEN 10 MG: 10 TABLET ORAL at 05:00

## 2024-03-31 NOTE — PLAN OF CARE
Goal Outcome Evaluation:  Plan of Care Reviewed With: patient        Progress: no change  Outcome Evaluation: Vital signs stable. Alert and oriented. Continues with Baclofen and Percocet for pain management. Uses trapeze to reposition self. Refuses repositioning by staff. Continue plan of care.

## 2024-03-31 NOTE — PLAN OF CARE
Goal Outcome Evaluation:              Outcome Evaluation: pt alert and oriented. medicated with baclofen and percocet per prn orders. uses trapeze to reposition in bed. refuses to turn by staff. wound care as ordered.

## 2024-04-01 LAB
GLUCOSE BLDC GLUCOMTR-MCNC: 103 MG/DL (ref 70–99)
GLUCOSE BLDC GLUCOMTR-MCNC: 112 MG/DL (ref 70–99)
GLUCOSE BLDC GLUCOMTR-MCNC: 115 MG/DL (ref 70–99)
GLUCOSE BLDC GLUCOMTR-MCNC: 127 MG/DL (ref 70–99)
GLUCOSE BLDC GLUCOMTR-MCNC: 190 MG/DL (ref 70–99)
GLUCOSE BLDC GLUCOMTR-MCNC: 96 MG/DL (ref 70–99)

## 2024-04-01 PROCEDURE — 82948 REAGENT STRIP/BLOOD GLUCOSE: CPT

## 2024-04-01 PROCEDURE — 63710000001 INSULIN DETEMIR PER 5 UNITS: Performed by: PHYSICIAN ASSISTANT

## 2024-04-01 PROCEDURE — 82948 REAGENT STRIP/BLOOD GLUCOSE: CPT | Performed by: INTERNAL MEDICINE

## 2024-04-01 PROCEDURE — 63710000001 ONDANSETRON ODT 4 MG TABLET DISPERSIBLE: Performed by: INTERNAL MEDICINE

## 2024-04-01 RX ADMIN — BACLOFEN 10 MG: 10 TABLET ORAL at 06:14

## 2024-04-01 RX ADMIN — ATORVASTATIN CALCIUM 10 MG: 10 TABLET, FILM COATED ORAL at 21:00

## 2024-04-01 RX ADMIN — PREGABALIN 100 MG: 100 CAPSULE ORAL at 16:56

## 2024-04-01 RX ADMIN — INSULIN DETEMIR 40 UNITS: 100 INJECTION, SOLUTION SUBCUTANEOUS at 21:00

## 2024-04-01 RX ADMIN — APIXABAN 5 MG: 5 TABLET, FILM COATED ORAL at 21:00

## 2024-04-01 RX ADMIN — APIXABAN 5 MG: 5 TABLET, FILM COATED ORAL at 08:06

## 2024-04-01 RX ADMIN — PREGABALIN 100 MG: 100 CAPSULE ORAL at 08:06

## 2024-04-01 RX ADMIN — SERTRALINE HYDROCHLORIDE 50 MG: 50 TABLET ORAL at 22:41

## 2024-04-01 RX ADMIN — OXYCODONE AND ACETAMINOPHEN 1 TABLET: 10; 325 TABLET ORAL at 14:36

## 2024-04-01 RX ADMIN — Medication 500 MG: at 08:06

## 2024-04-01 RX ADMIN — WHITE PETROLATUM 1 APPLICATION: 1.75 OINTMENT TOPICAL at 11:40

## 2024-04-01 RX ADMIN — EMPAGLIFLOZIN 10 MG: 10 TABLET, FILM COATED ORAL at 08:06

## 2024-04-01 RX ADMIN — BACLOFEN 10 MG: 10 TABLET ORAL at 22:35

## 2024-04-01 RX ADMIN — OXYCODONE AND ACETAMINOPHEN 1 TABLET: 10; 325 TABLET ORAL at 06:14

## 2024-04-01 RX ADMIN — Medication 500 MG: at 22:40

## 2024-04-01 RX ADMIN — FERROUS SULFATE TAB 325 MG (65 MG ELEMENTAL FE) 325 MG: 325 (65 FE) TAB at 08:06

## 2024-04-01 RX ADMIN — IBUPROFEN 400 MG: 400 TABLET ORAL at 02:55

## 2024-04-01 RX ADMIN — ONDANSETRON 4 MG: 4 TABLET, ORALLY DISINTEGRATING ORAL at 13:37

## 2024-04-01 RX ADMIN — OXYCODONE AND ACETAMINOPHEN 1 TABLET: 10; 325 TABLET ORAL at 22:35

## 2024-04-01 RX ADMIN — PREGABALIN 100 MG: 100 CAPSULE ORAL at 22:41

## 2024-04-01 RX ADMIN — BACLOFEN 10 MG: 10 TABLET ORAL at 14:36

## 2024-04-01 RX ADMIN — CYANOCOBALAMIN TAB 500 MCG 1000 MCG: 500 TAB at 08:06

## 2024-04-01 RX ADMIN — Medication: at 21:00

## 2024-04-01 RX ADMIN — Medication 10 MG: at 22:39

## 2024-04-01 RX ADMIN — INSULIN DETEMIR 40 UNITS: 100 INJECTION, SOLUTION SUBCUTANEOUS at 08:05

## 2024-04-01 RX ADMIN — LISINOPRIL 2.5 MG: 2.5 TABLET ORAL at 08:06

## 2024-04-01 NOTE — PLAN OF CARE
Goal Outcome Evaluation:  Plan of Care Reviewed With: patient        Progress: no change  Outcome Evaluation: AOx4, call light and personal items within reach. Able to make needs known. Bedpan usage and urinal at bedside. C/o hip pain, medication given. Con't montioring blood sugars (see labs). Skin barrier applied PRN. Nausea was reported after lunch, medication given, rsd reported improvement. Con't plan of care

## 2024-04-01 NOTE — PLAN OF CARE
Goal Outcome Evaluation:  Plan of Care Reviewed With: patient        Progress: no change  Outcome Evaluation: Alert and oriented. Vital signs stable. Uses trapeze for repositioning. Percocet, Baclofen, and ibuprofen administered for pain management of left shoulder and hip. Continue plan of care.

## 2024-04-02 LAB
ANION GAP SERPL CALCULATED.3IONS-SCNC: 7.2 MMOL/L (ref 5–15)
BUN SERPL-MCNC: 13 MG/DL (ref 8–23)
BUN/CREAT SERPL: 27.1 (ref 7–25)
CALCIUM SPEC-SCNC: 8.4 MG/DL (ref 8.6–10.5)
CHLORIDE SERPL-SCNC: 106 MMOL/L (ref 98–107)
CO2 SERPL-SCNC: 22.8 MMOL/L (ref 22–29)
CREAT SERPL-MCNC: 0.48 MG/DL (ref 0.76–1.27)
DEPRECATED RDW RBC AUTO: 49.6 FL (ref 37–54)
EGFRCR SERPLBLD CKD-EPI 2021: 114.6 ML/MIN/1.73
ERYTHROCYTE [DISTWIDTH] IN BLOOD BY AUTOMATED COUNT: 15.5 % (ref 12.3–15.4)
GLUCOSE BLDC GLUCOMTR-MCNC: 102 MG/DL (ref 70–99)
GLUCOSE BLDC GLUCOMTR-MCNC: 122 MG/DL (ref 70–99)
GLUCOSE BLDC GLUCOMTR-MCNC: 122 MG/DL (ref 70–99)
GLUCOSE BLDC GLUCOMTR-MCNC: 186 MG/DL (ref 70–99)
GLUCOSE BLDC GLUCOMTR-MCNC: 90 MG/DL (ref 70–99)
GLUCOSE SERPL-MCNC: 114 MG/DL (ref 65–99)
HCT VFR BLD AUTO: 36.5 % (ref 37.5–51)
HGB BLD-MCNC: 11.7 G/DL (ref 13–17.7)
MCH RBC QN AUTO: 28.1 PG (ref 26.6–33)
MCHC RBC AUTO-ENTMCNC: 32.1 G/DL (ref 31.5–35.7)
MCV RBC AUTO: 87.7 FL (ref 79–97)
PLATELET # BLD AUTO: 82 10*3/MM3 (ref 140–450)
PMV BLD AUTO: 12.4 FL (ref 6–12)
POTASSIUM SERPL-SCNC: 4 MMOL/L (ref 3.5–5.2)
RBC # BLD AUTO: 4.16 10*6/MM3 (ref 4.14–5.8)
SODIUM SERPL-SCNC: 136 MMOL/L (ref 136–145)
WBC NRBC COR # BLD AUTO: 4 10*3/MM3 (ref 3.4–10.8)

## 2024-04-02 PROCEDURE — 85027 COMPLETE CBC AUTOMATED: CPT | Performed by: INTERNAL MEDICINE

## 2024-04-02 PROCEDURE — 82948 REAGENT STRIP/BLOOD GLUCOSE: CPT

## 2024-04-02 PROCEDURE — 82948 REAGENT STRIP/BLOOD GLUCOSE: CPT | Performed by: INTERNAL MEDICINE

## 2024-04-02 PROCEDURE — 80048 BASIC METABOLIC PNL TOTAL CA: CPT | Performed by: INTERNAL MEDICINE

## 2024-04-02 PROCEDURE — 63710000001 INSULIN LISPRO (HUMAN) PER 5 UNITS: Performed by: INTERNAL MEDICINE

## 2024-04-02 PROCEDURE — 63710000001 INSULIN DETEMIR PER 5 UNITS: Performed by: PHYSICIAN ASSISTANT

## 2024-04-02 RX ORDER — OXYCODONE AND ACETAMINOPHEN 10; 325 MG/1; MG/1
1 TABLET ORAL EVERY 8 HOURS PRN
Status: DISPENSED | OUTPATIENT
Start: 2024-04-02 | End: 2024-04-11

## 2024-04-02 RX ADMIN — OXYCODONE AND ACETAMINOPHEN 1 TABLET: 10; 325 TABLET ORAL at 15:09

## 2024-04-02 RX ADMIN — BACLOFEN 10 MG: 10 TABLET ORAL at 15:09

## 2024-04-02 RX ADMIN — EMPAGLIFLOZIN 10 MG: 10 TABLET, FILM COATED ORAL at 08:16

## 2024-04-02 RX ADMIN — PREGABALIN 100 MG: 100 CAPSULE ORAL at 17:37

## 2024-04-02 RX ADMIN — Medication 500 MG: at 08:16

## 2024-04-02 RX ADMIN — Medication 10 MG: at 21:21

## 2024-04-02 RX ADMIN — FERROUS SULFATE TAB 325 MG (65 MG ELEMENTAL FE) 325 MG: 325 (65 FE) TAB at 08:16

## 2024-04-02 RX ADMIN — OXYCODONE 5 MG: 5 TABLET ORAL at 04:03

## 2024-04-02 RX ADMIN — OXYCODONE AND ACETAMINOPHEN 1 TABLET: 10; 325 TABLET ORAL at 07:01

## 2024-04-02 RX ADMIN — BACLOFEN 10 MG: 10 TABLET ORAL at 23:18

## 2024-04-02 RX ADMIN — IBUPROFEN 400 MG: 400 TABLET ORAL at 07:06

## 2024-04-02 RX ADMIN — PREGABALIN 100 MG: 100 CAPSULE ORAL at 21:21

## 2024-04-02 RX ADMIN — CYANOCOBALAMIN TAB 500 MCG 1000 MCG: 500 TAB at 08:16

## 2024-04-02 RX ADMIN — INSULIN DETEMIR 40 UNITS: 100 INJECTION, SOLUTION SUBCUTANEOUS at 21:19

## 2024-04-02 RX ADMIN — OXYCODONE AND ACETAMINOPHEN 1 TABLET: 10; 325 TABLET ORAL at 23:18

## 2024-04-02 RX ADMIN — PREGABALIN 100 MG: 100 CAPSULE ORAL at 08:16

## 2024-04-02 RX ADMIN — WHITE PETROLATUM 1 APPLICATION: 1.75 OINTMENT TOPICAL at 11:34

## 2024-04-02 RX ADMIN — APIXABAN 5 MG: 5 TABLET, FILM COATED ORAL at 08:16

## 2024-04-02 RX ADMIN — INSULIN LISPRO 4 UNITS: 100 INJECTION, SOLUTION INTRAVENOUS; SUBCUTANEOUS at 21:18

## 2024-04-02 RX ADMIN — Medication 500 MG: at 21:21

## 2024-04-02 RX ADMIN — APIXABAN 5 MG: 5 TABLET, FILM COATED ORAL at 21:21

## 2024-04-02 RX ADMIN — Medication: at 21:29

## 2024-04-02 RX ADMIN — SERTRALINE HYDROCHLORIDE 50 MG: 50 TABLET ORAL at 23:19

## 2024-04-02 RX ADMIN — ATORVASTATIN CALCIUM 10 MG: 10 TABLET, FILM COATED ORAL at 21:21

## 2024-04-02 RX ADMIN — INSULIN DETEMIR 40 UNITS: 100 INJECTION, SOLUTION SUBCUTANEOUS at 08:15

## 2024-04-02 RX ADMIN — LISINOPRIL 2.5 MG: 2.5 TABLET ORAL at 08:16

## 2024-04-02 RX ADMIN — BACLOFEN 10 MG: 10 TABLET ORAL at 07:02

## 2024-04-02 NOTE — PLAN OF CARE
Goal Outcome Evaluation:  Plan of Care Reviewed With: patient. AOX4 makes needs known, BMI 40.32,  pt to be weighed today. Blood glucose 115. Call light and personal belongings within reach.

## 2024-04-02 NOTE — PLAN OF CARE
Goal Outcome Evaluation:  Plan of Care Reviewed With: patient        Progress: no change  Outcome Evaluation: AOx4, call light and personal items within reach. Able to make needs known. Bedpan usage and urinal at bedside. C/o hip pain, medication given. Skin barrier applied. No significant changes. Con't plan of care

## 2024-04-03 LAB
GLUCOSE BLDC GLUCOMTR-MCNC: 122 MG/DL (ref 70–99)
GLUCOSE BLDC GLUCOMTR-MCNC: 153 MG/DL (ref 70–99)
GLUCOSE BLDC GLUCOMTR-MCNC: 165 MG/DL (ref 70–99)
GLUCOSE BLDC GLUCOMTR-MCNC: 182 MG/DL (ref 70–99)

## 2024-04-03 PROCEDURE — 82948 REAGENT STRIP/BLOOD GLUCOSE: CPT | Performed by: INTERNAL MEDICINE

## 2024-04-03 PROCEDURE — 63710000001 INSULIN DETEMIR PER 5 UNITS: Performed by: PHYSICIAN ASSISTANT

## 2024-04-03 PROCEDURE — 82948 REAGENT STRIP/BLOOD GLUCOSE: CPT

## 2024-04-03 PROCEDURE — 63710000001 INSULIN LISPRO (HUMAN) PER 5 UNITS: Performed by: INTERNAL MEDICINE

## 2024-04-03 PROCEDURE — 63710000001 ONDANSETRON ODT 4 MG TABLET DISPERSIBLE: Performed by: INTERNAL MEDICINE

## 2024-04-03 RX ADMIN — Medication 500 MG: at 08:37

## 2024-04-03 RX ADMIN — FERROUS SULFATE TAB 325 MG (65 MG ELEMENTAL FE) 325 MG: 325 (65 FE) TAB at 08:03

## 2024-04-03 RX ADMIN — PREGABALIN 100 MG: 100 CAPSULE ORAL at 16:33

## 2024-04-03 RX ADMIN — DICLOFENAC SODIUM 4 G: 10 GEL TOPICAL at 21:51

## 2024-04-03 RX ADMIN — BACLOFEN 10 MG: 10 TABLET ORAL at 16:01

## 2024-04-03 RX ADMIN — INSULIN LISPRO 4 UNITS: 100 INJECTION, SOLUTION INTRAVENOUS; SUBCUTANEOUS at 12:12

## 2024-04-03 RX ADMIN — WHITE PETROLATUM 1 APPLICATION: 1.75 OINTMENT TOPICAL at 11:00

## 2024-04-03 RX ADMIN — DICLOFENAC SODIUM 4 G: 10 GEL TOPICAL at 17:55

## 2024-04-03 RX ADMIN — ATORVASTATIN CALCIUM 10 MG: 10 TABLET, FILM COATED ORAL at 21:50

## 2024-04-03 RX ADMIN — SERTRALINE HYDROCHLORIDE 50 MG: 50 TABLET ORAL at 21:49

## 2024-04-03 RX ADMIN — INSULIN LISPRO 4 UNITS: 100 INJECTION, SOLUTION INTRAVENOUS; SUBCUTANEOUS at 22:00

## 2024-04-03 RX ADMIN — INSULIN DETEMIR 40 UNITS: 100 INJECTION, SOLUTION SUBCUTANEOUS at 21:49

## 2024-04-03 RX ADMIN — WHITE PETROLATUM 1 APPLICATION: 1.75 OINTMENT TOPICAL at 21:51

## 2024-04-03 RX ADMIN — INSULIN DETEMIR 40 UNITS: 100 INJECTION, SOLUTION SUBCUTANEOUS at 08:37

## 2024-04-03 RX ADMIN — OXYCODONE AND ACETAMINOPHEN 1 TABLET: 10; 325 TABLET ORAL at 07:26

## 2024-04-03 RX ADMIN — ONDANSETRON 4 MG: 4 TABLET, ORALLY DISINTEGRATING ORAL at 12:26

## 2024-04-03 RX ADMIN — OXYCODONE AND ACETAMINOPHEN 1 TABLET: 10; 325 TABLET ORAL at 16:01

## 2024-04-03 RX ADMIN — IBUPROFEN 400 MG: 400 TABLET ORAL at 05:50

## 2024-04-03 RX ADMIN — PREGABALIN 100 MG: 100 CAPSULE ORAL at 21:50

## 2024-04-03 RX ADMIN — LISINOPRIL 2.5 MG: 2.5 TABLET ORAL at 08:37

## 2024-04-03 RX ADMIN — Medication 500 MG: at 21:50

## 2024-04-03 RX ADMIN — OXYCODONE AND ACETAMINOPHEN 1 TABLET: 10; 325 TABLET ORAL at 23:51

## 2024-04-03 RX ADMIN — APIXABAN 5 MG: 5 TABLET, FILM COATED ORAL at 21:50

## 2024-04-03 RX ADMIN — CYANOCOBALAMIN TAB 500 MCG 1000 MCG: 500 TAB at 08:37

## 2024-04-03 RX ADMIN — INSULIN LISPRO 4 UNITS: 100 INJECTION, SOLUTION INTRAVENOUS; SUBCUTANEOUS at 08:30

## 2024-04-03 RX ADMIN — DICLOFENAC SODIUM 4 G: 10 GEL TOPICAL at 08:40

## 2024-04-03 RX ADMIN — DICLOFENAC SODIUM 4 G: 10 GEL TOPICAL at 12:12

## 2024-04-03 RX ADMIN — APIXABAN 5 MG: 5 TABLET, FILM COATED ORAL at 08:37

## 2024-04-03 RX ADMIN — BACLOFEN 10 MG: 10 TABLET ORAL at 23:53

## 2024-04-03 RX ADMIN — BACLOFEN 10 MG: 10 TABLET ORAL at 07:26

## 2024-04-03 RX ADMIN — Medication 10 MG: at 21:50

## 2024-04-03 RX ADMIN — PREGABALIN 100 MG: 100 CAPSULE ORAL at 08:37

## 2024-04-03 RX ADMIN — EMPAGLIFLOZIN 10 MG: 10 TABLET, FILM COATED ORAL at 08:37

## 2024-04-03 RX ADMIN — Medication: at 21:50

## 2024-04-03 NOTE — PLAN OF CARE
Goal Outcome Evaluation:  Plan of Care Reviewed With: patient        Progress: no change  Outcome Evaluation: Patient alert and oriented x4. Given PRN Perocet and Baclofen at 0726 and again at 1601 for c/o left hip pain and spasms rated 10/10. med effective each occurance. Given PRN Zofran at 1229 for c/o nausea. Med effective. Voices needs. Con't current POC.

## 2024-04-04 LAB
GLUCOSE BLDC GLUCOMTR-MCNC: 120 MG/DL (ref 70–99)
GLUCOSE BLDC GLUCOMTR-MCNC: 120 MG/DL (ref 70–99)
GLUCOSE BLDC GLUCOMTR-MCNC: 129 MG/DL (ref 70–99)
GLUCOSE BLDC GLUCOMTR-MCNC: 168 MG/DL (ref 70–99)

## 2024-04-04 PROCEDURE — 63710000001 INSULIN DETEMIR PER 5 UNITS: Performed by: PHYSICIAN ASSISTANT

## 2024-04-04 PROCEDURE — 82948 REAGENT STRIP/BLOOD GLUCOSE: CPT | Performed by: INTERNAL MEDICINE

## 2024-04-04 PROCEDURE — 63710000001 INSULIN LISPRO (HUMAN) PER 5 UNITS: Performed by: INTERNAL MEDICINE

## 2024-04-04 PROCEDURE — 82948 REAGENT STRIP/BLOOD GLUCOSE: CPT

## 2024-04-04 RX ADMIN — OXYCODONE 5 MG: 5 TABLET ORAL at 04:00

## 2024-04-04 RX ADMIN — LISINOPRIL 2.5 MG: 2.5 TABLET ORAL at 08:04

## 2024-04-04 RX ADMIN — BACLOFEN 10 MG: 10 TABLET ORAL at 08:04

## 2024-04-04 RX ADMIN — PREGABALIN 100 MG: 100 CAPSULE ORAL at 22:00

## 2024-04-04 RX ADMIN — INSULIN DETEMIR 40 UNITS: 100 INJECTION, SOLUTION SUBCUTANEOUS at 22:00

## 2024-04-04 RX ADMIN — Medication 500 MG: at 08:04

## 2024-04-04 RX ADMIN — Medication 500 MG: at 22:00

## 2024-04-04 RX ADMIN — IBUPROFEN 400 MG: 400 TABLET ORAL at 09:26

## 2024-04-04 RX ADMIN — INSULIN LISPRO 4 UNITS: 100 INJECTION, SOLUTION INTRAVENOUS; SUBCUTANEOUS at 08:05

## 2024-04-04 RX ADMIN — OXYCODONE AND ACETAMINOPHEN 1 TABLET: 10; 325 TABLET ORAL at 17:41

## 2024-04-04 RX ADMIN — APIXABAN 5 MG: 5 TABLET, FILM COATED ORAL at 22:00

## 2024-04-04 RX ADMIN — BACLOFEN 10 MG: 10 TABLET ORAL at 17:41

## 2024-04-04 RX ADMIN — Medication: at 22:00

## 2024-04-04 RX ADMIN — OXYCODONE AND ACETAMINOPHEN 1 TABLET: 10; 325 TABLET ORAL at 08:04

## 2024-04-04 RX ADMIN — DICLOFENAC SODIUM 4 G: 10 GEL TOPICAL at 22:00

## 2024-04-04 RX ADMIN — DICLOFENAC SODIUM 4 G: 10 GEL TOPICAL at 12:37

## 2024-04-04 RX ADMIN — EMPAGLIFLOZIN 10 MG: 10 TABLET, FILM COATED ORAL at 08:03

## 2024-04-04 RX ADMIN — DICLOFENAC SODIUM 4 G: 10 GEL TOPICAL at 17:42

## 2024-04-04 RX ADMIN — PREGABALIN 100 MG: 100 CAPSULE ORAL at 15:38

## 2024-04-04 RX ADMIN — FERROUS SULFATE TAB 325 MG (65 MG ELEMENTAL FE) 325 MG: 325 (65 FE) TAB at 08:04

## 2024-04-04 RX ADMIN — SERTRALINE HYDROCHLORIDE 50 MG: 50 TABLET ORAL at 22:00

## 2024-04-04 RX ADMIN — APIXABAN 5 MG: 5 TABLET, FILM COATED ORAL at 08:04

## 2024-04-04 RX ADMIN — ATORVASTATIN CALCIUM 10 MG: 10 TABLET, FILM COATED ORAL at 22:00

## 2024-04-04 RX ADMIN — Medication 10 MG: at 22:00

## 2024-04-04 RX ADMIN — PREGABALIN 100 MG: 100 CAPSULE ORAL at 08:03

## 2024-04-04 RX ADMIN — INSULIN DETEMIR 40 UNITS: 100 INJECTION, SOLUTION SUBCUTANEOUS at 08:05

## 2024-04-04 RX ADMIN — CYANOCOBALAMIN TAB 500 MCG 1000 MCG: 500 TAB at 08:04

## 2024-04-04 RX ADMIN — DICLOFENAC SODIUM 4 G: 10 GEL TOPICAL at 08:03

## 2024-04-04 RX ADMIN — WHITE PETROLATUM 1 APPLICATION: 1.75 OINTMENT TOPICAL at 22:14

## 2024-04-04 RX ADMIN — WHITE PETROLATUM 1 APPLICATION: 1.75 OINTMENT TOPICAL at 10:49

## 2024-04-04 NOTE — SIGNIFICANT NOTE
Wound Eval / Progress Noted    KEO Dominguez     Patient Name: Jose Shaikh  : 1960  MRN: [id number]  Today's Date: 2024                 Admit Date: 2023    Visit Dx:    ICD-10-CM ICD-9-CM   1. Difficulty in walking  R26.2 719.7   2. Type 2 diabetes mellitus with other specified complication, unspecified whether long term insulin use  E11.69 250.80         Debility    Ulcer of right foot    Ulcerated, foot, left, limited to breakdown of skin    Onychomycosis        Past Medical History:   Diagnosis Date    Absence of toe of right foot     Acute osteomyelitis of left calcaneus  2021    Anxiety and depression     Arthritis     Cancer     Chronic pain     STATES HIS PAIN IS 10/10 AAT    Claustrophobia     Corns and callus     Diabetic ulcer of left heel associated with type 2 DM 2021    Diabetic ulcer of left heel associated with type 2 DM 2021    Diabetic ulcer of right midfoot associated with type 2 DM 2021    Difficulty walking     Essential hypertension 2021    Hammertoe     Hyperlipidemia LDL goal <100 2021    Ingrown toenail     Obesity     Paroxysmal atrial fibrillation 2021    Polyneuropathy     Pressure ulcer, stage 1     Tinea unguium     Type 2 diabetes mellitus with polyneuropathy         Past Surgical History:   Procedure Laterality Date    CYST REMOVAL      center of back; benign    EYE SURGERY      INCISION AND DRAINAGE ABSCESS      back    INCISION AND DRAINAGE LEG Right 12/10/2021    Procedure: INCISION AND DRAINAGE LOWER EXTREMITY;  Surgeon: Ash Leyva DPM;  Location: [facility] MAIN OR;  Service: Podiatry;  Laterality: Right;    OTHER SURGICAL HISTORY      Surgical clips left foot    TOE SURGERY Right     Removal of 5th toe    TRANS METATARSAL AMPUTATION Right 2021    Procedure: AMPUTATION TRANS METATARSAL;  Surgeon: Ash Leyva DPM;  Location: [facility] MAIN OR;  Service: Podiatry;  Laterality: Right;    VASCULAR  SURGERY      WRIST SURGERY Left     repair of injury         Physical Assessment:     04/04/24 1115   Wound 01/22/24 1343 Left anterior fourth toe Abrasion   Placement Date/Time: 01/22/24 1343   Present on Original Admission: No  Side: Left  Orientation: anterior  Location: fourth toe  Primary Wound Type: Abrasion   Wound Image    Dressing Appearance open to air   Closure None   Base pink;dry;epithelialization   Periwound intact;pink   Periwound Temperature warm   Periwound Skin Turgor soft   Edges rolled/closed   Drainage Amount none   Care, Wound cleansed with;sterile normal saline   Wound 03/19/24 1509 Left lower abdomen Blisters   Placement Date/Time: 03/19/24 1509   Side: Left  Orientation: lower  Location: abdomen  Primary Wound Type: Blisters   Wound Image    Dressing Appearance intact;no drainage   Closure None   Base pink;dry   Periwound intact;dry;pink   Periwound Temperature warm   Periwound Skin Turgor soft   Edges rolled/closed   Drainage Amount none   Care, Wound cleansed with;sterile normal saline   Dressing Care open to air   Wound 03/27/24 1045 Right anterior Skin Tear   Placement Date/Time: 03/27/24 1045   Side: Right  Orientation: anterior  Primary Wound Type: Skin Tear   Wound Image    Dressing Appearance intact;moist drainage   Closure None   Base moist;red;purple;dry   Periwound intact;pink   Periwound Temperature warm   Periwound Skin Turgor soft   Edges open   Wound Length (cm) 0.3 cm  (1.0 crusting)   Wound Width (cm) 1.3 cm  (1.0 crusting)   Wound Depth (cm) 0.1 cm   Drainage Amount none   Care, Wound cleansed with;sterile normal saline   Dressing Care dressing applied;border dressing;non-adherent;petroleum-based   Periwound Care absorptive dressing applied   Wound 03/28/24 1225 Right medial ankle Traumatic   Placement Date/Time: 03/28/24 1225   Side: Right  Orientation: medial  Location: ankle  Primary Wound Type: Traumatic   Wound Image    Dressing Appearance intact;moist drainage    Closure None   Base moist;pink   Red (%), Wound Tissue Color 100   Periwound intact;dry;pink   Periwound Temperature warm   Periwound Skin Turgor soft   Edges open   Wound Length (cm) 0.3 cm   Wound Width (cm) 0.4 cm   Wound Depth (cm) 0.1 cm   Wound Surface Area (cm^2) 0.12 cm^2   Wound Volume (cm^3) 0.012 cm^3   Drainage Characteristics/Odor serosanguineous   Drainage Amount small   Care, Wound cleansed with;sterile normal saline   Dressing Care dressing applied;non-adherent;petroleum-based;border dressing;silicone   Periwound Care absorptive dressing applied   Wound 04/03/24 1730 Right anterior hip MASD (Moisture associated skin damage)   Placement Date/Time: 04/03/24 1730   Side: Right  Orientation: anterior  Location: hip  Primary Wound Type: MASD (Moisture associated skin damage)   Wound Image    Dressing Appearance open to air   Closure None   Base moist;red   Periwound moist;macerated;redness   Periwound Temperature warm   Periwound Skin Turgor soft   Edges open   Drainage Characteristics/Odor serosanguineous   Drainage Amount small   Care, Wound cleansed with;sterile normal saline   Dressing Care open to air   Periwound Care barrier ointment applied        Wound Check / Follow-up:  Patient seen today for wound follow-up. Patient is currently lying in bed resting with his eyes closed. He arouses to name and is agreeable to allowing assessment.     Patient with resolved wound to left fourth toe. With cleansing with NS and gauze, the previously dark spot wiped off and revealed intact pink epithelium with no open tissue or drainage noted. Recommending skin care / hygiene.     Right distal foot with two superficial open areas. One to medial aspect that is 0.3cm x 1.3cm x 0.1cm with serosanguinous drainage. Lateral wound with dried dark tissue measuring 1.0cm x 1.0cm with no drainage noted. Cleansed with NS and gauze. Recommending to continue current wound care with non-adherent petroleum based gauze and  silicone border dressings.     To the right anterior ankle only very small opening with pink moist tissue noted. This tissue measures 0.3cm x0.4cm x 0.1cm. Cleansed with NS and gauze. Blotted dry. Recommending to continue current wound care with non-adherent petroleum based gauze and silicone border dressings.     Left abdominal wall with intact dressing. Dressing removed. Dried darkened area noted. With cleansing with NS and gauze, entirety of darkened area removed revealing pink intact epithelium. No open tissue and no moist drainage. Recommending skin care and hygiene.     Moisture and redness noted to abdominal fold, bilateral groin and perineum. There is erosion noted along right abdominal crease with serosanguinous drainage. Calazime applied for skin protection and to assist with drying of moisture.     Bilateral gluteal aspects with blanchable redness but intact skin. Skin protection recommended.       Impression: Right distal foot and right anterior ankle traumatic injuries. Resolved would to left 4th toe and abdominal wall. MASD with erosion to right abdominal crease. Moisture and redness within groin and to perineum.     Short term goals:  Regain skin integrity. Topical treatments. Daily dressing changes. Skin protection, moisture prevention, pressure reduction.     Bettye Crews RN    4/4/2024    14:06 EDT

## 2024-04-04 NOTE — PLAN OF CARE
Goal Outcome Evaluation:  Plan of Care Reviewed With: patient. He continues to lose weight to reach his goal of having hip surgery and become independent again. Prn pain medication Percocet and Baclofen given at midnight and oxycontin given at 0400. Call light and personal belongings within reach at bedside.

## 2024-04-04 NOTE — CONSULTS
"Nutrition Services    Patient Name: Jose Shaikh  YOB: 1960  MRN: [id number]  Admission date: 11/6/2023      CLINICAL NUTRITION ASSESSMENT      Reason for Assessment  Follow Up     H&P:  Past Medical History:   Diagnosis Date    Absence of toe of right foot     Acute osteomyelitis of left calcaneus  08/18/2021    Anxiety and depression     Arthritis     Cancer     Chronic pain     STATES HIS PAIN IS 10/10 AAT    Claustrophobia     Corns and callus     Diabetic ulcer of left heel associated with type 2 DM 08/18/2021    Diabetic ulcer of left heel associated with type 2 DM 07/06/2021    Diabetic ulcer of right midfoot associated with type 2 DM 08/18/2021    Difficulty walking     Essential hypertension 08/31/2021    Hammertoe     Hyperlipidemia LDL goal <100 08/31/2021    Ingrown toenail     Obesity     Paroxysmal atrial fibrillation 08/31/2021    Polyneuropathy     Pressure ulcer, stage 1     Tinea unguium     Type 2 diabetes mellitus with polyneuropathy         Current Problems:   Active Hospital Problems    Diagnosis     **Debility     Ulcerated, foot, left, limited to breakdown of skin     Onychomycosis     Ulcer of right foot         Nutrition/Diet History         Narrative   Pt's intake is adequate, 100% of majority of meals. Pt is weighed weekly to measure wt loss required for hip surgery (on Ozempic). No acute nutrition concerns at this time. RD will continue to monitor per protocol.         Anthropometrics        Current Height, Weight Height: 188 cm (74.02\")  Weight: (!) 157 kg (345 lb 0.3 oz)   Current BMI Body mass index is 44.28 kg/m².   BMI Classification Obese Class III   % %   Adjusted Body Weight (ABW) 104 kg   Weight Hx  Wt Readings from Last 30 Encounters:   04/02/24 1118 (!) 157 kg (345 lb 0.3 oz)   03/26/24 1003 (!) 143 kg (314 lb 2.5 oz)   03/18/24 1323 (!) 151 kg (332 lb 3.7 oz)   03/18/24 0500 (!) 171 kg (378 lb 1.4 oz)   03/17/24 0500 (!) 173 kg (380 lb 15.3 oz) "   03/16/24 0500 (!) 171 kg (376 lb 15.8 oz)   03/15/24 0500 (!) 161 kg (354 lb 8 oz)   03/14/24 0300 (!) 173 kg (382 lb 4.4 oz)   03/12/24 0500 (!) 158 kg (347 lb 14.2 oz)   03/11/24 0500 (!) 153 kg (337 lb 8.4 oz)   03/10/24 0540 (!) 154 kg (339 lb 4.6 oz)   03/09/24 0500 (!) 153 kg (337 lb 1.3 oz)   03/08/24 1323 (!) 153 kg (337 lb 8 oz)   03/08/24 0500 (!) 157 kg (346 lb 2 oz)   03/06/24 2300 (!) 155 kg (342 lb 2.5 oz)   03/05/24 0415 (!) 155 kg (342 lb 13 oz)   03/04/24 0500 (!) 156 kg (344 lb 9.3 oz)   03/03/24 0500 (!) 156 kg (344 lb 9.3 oz)   03/02/24 0530 (!) 157 kg (346 lb 12.5 oz)   03/01/24 0500 (!) 157 kg (345 lb 3.9 oz)   02/29/24 0500 (!) 157 kg (347 lb 3.6 oz)   02/28/24 0509 (!) 158 kg (347 lb 14.2 oz)   02/27/24 0500 (!) 159 kg (351 lb 3.1 oz)   02/26/24 0500 (!) 159 kg (350 lb 12 oz)   02/25/24 0500 (!) 160 kg (351 lb 10.1 oz)   02/24/24 0500 (!) 160 kg (352 lb 1.2 oz)   02/23/24 0500 (!) 160 kg (353 lb 6.4 oz)   02/22/24 0500 (!) 160 kg (353 lb 13.4 oz)   02/21/24 0514 (!) 162 kg (356 lb 7.7 oz)   02/20/24 0500 (!) 161 kg (355 lb 2.6 oz)   02/19/24 0300 (!) 160 kg (353 lb 6.4 oz)   02/18/24 0500 (!) 162 kg (356 lb 7.7 oz)   02/17/24 0500 (!) 162 kg (357 lb 2.3 oz)   02/16/24 0500 (!) 162 kg (358 lb 0.4 oz)   02/15/24 0532 (!) 163 kg (360 lb 3.7 oz)   02/14/24 0600 (!) 162 kg (357 lb 5.9 oz)   02/13/24 0500 (!) 162 kg (357 lb 9.4 oz)   02/12/24 1352 (!) 160 kg (352 lb 4.7 oz)   02/12/24 0500 (!) 166 kg (367 lb 1.1 oz)   02/11/24 0500 (!) 169 kg (372 lb 2.2 oz)   02/10/24 0500 (!) 168 kg (370 lb 9.5 oz)   02/09/24 0600 (!) 168 kg (370 lb 9.5 oz)   02/08/24 0600 (!) 165 kg (363 lb 15.7 oz)   02/07/24 0600 (!) 166 kg (366 lb 10 oz)   02/06/24 0419 (!) 166 kg (366 lb 10 oz)   02/05/24 0500 (!) 167 kg (367 lb 4.6 oz)   02/04/24 0500 (!) 167 kg (367 lb 8.1 oz)   02/03/24 0500 (!) 166 kg (366 lb 6.5 oz)   02/02/24 0500 (!) 168 kg (369 lb 14.9 oz)   02/01/24 0613 (!) 169 kg (372 lb 5.7 oz)   01/31/24 0500  (!) 168 kg (371 lb 4.1 oz)   01/30/24 0500 (!) 169 kg (372 lb 12.8 oz)   01/29/24 0232 (!) 169 kg (372 lb 5.7 oz)   01/28/24 0500 (!) 167 kg (368 lb 6.2 oz)   01/26/24 0400 (!) 169 kg (372 lb 2.2 oz)   01/25/24 0417 (!) 167 kg (368 lb 9.8 oz)   01/24/24 0608 (!) 168 kg (371 lb 7.6 oz)   01/23/24 0500 (!) 168 kg (369 lb 14.9 oz)   01/22/24 0500 (!) 168 kg (371 lb 4.1 oz)   01/21/24 0500 (!) 168 kg (371 lb 4.1 oz)   01/20/24 0500 (!) 168 kg (371 lb 7.6 oz)   01/19/24 0500 (!) 171 kg (376 lb 1.7 oz)   01/18/24 0500 (!) 172 kg (380 lb 1.2 oz)   01/17/24 0614 (!) 172 kg (379 lb 6.6 oz)   01/16/24 0500 (!) 171 kg (378 lb 1.4 oz)   01/15/24 0500 (!) 169 kg (372 lb 2.2 oz)   01/14/24 0546 (!) 169 kg (373 lb 7.4 oz)   01/13/24 0507 (!) 171 kg (376 lb 5.2 oz)   01/12/24 0600 (!) 170 kg (374 lb 12.5 oz)   01/11/24 0600 (!) 169 kg (371 lb 14.7 oz)   01/10/24 0600 (!) 169 kg (371 lb 11.1 oz)   01/09/24 0500 (!) 168 kg (370 lb 9.5 oz)   01/08/24 0448 (!) 168 kg (370 lb 9.5 oz)   01/07/24 0454 (!) 167 kg (367 lb 15.2 oz)   01/06/24 0500 (!) 166 kg (366 lb 10 oz)   01/05/24 0555 (!) 165 kg (364 lb 6.7 oz)   01/04/24 0500 (!) 165 kg (363 lb 12.1 oz)   01/03/24 0500 (!) 166 kg (365 lb 1.3 oz)   01/02/24 0500 (!) 167 kg (367 lb 4.6 oz)   01/01/24 0500 (!) 166 kg (365 lb 11.9 oz)   12/31/23 0500 (!) 160 kg (352 lb 4.7 oz)   12/30/23 0500 (!) 167 kg (367 lb 15.2 oz)   12/29/23 0500 (!) 166 kg (366 lb 10 oz)   12/28/23 0500 (!) 165 kg (364 lb 13.8 oz)   12/27/23 0500 (!) 166 kg (367 lb 1.1 oz)   12/26/23 0500 (!) 167 kg (367 lb 4.6 oz)   12/25/23 0500 (!) 165 kg (364 lb 3.2 oz)   12/24/23 0500 (!) 164 kg (362 lb 7 oz)   12/23/23 0500 (!) 163 kg (360 lb 0.2 oz)   12/22/23 0600 (!) 163 kg (358 lb 14.5 oz)   12/21/23 0500 (!) 163 kg (359 lb 12.7 oz)   12/20/23 0500 (!) 162 kg (357 lb 9.4 oz)   12/19/23 0550 (!) 163 kg (359 lb 5.6 oz)   12/18/23 0500 (!) 161 kg (355 lb 13.2 oz)   12/17/23 0500 (!) 160 kg (353 lb 13.4 oz)   12/16/23 1541 (!)  160 kg (353 lb 3.2 oz)   12/16/23 0500 (!) 165 kg (364 lb 13.8 oz)   12/15/23 0500 (!) 165 kg (362 lb 10.5 oz)   12/14/23 0500 (!) 157 kg (345 lb 14.4 oz)   12/13/23 0500 (!) 153 kg (337 lb 4.9 oz)   12/12/23 0500 (!) 155 kg (341 lb 0.8 oz)   12/11/23 1049 (!) 155 kg (341 lb 0.8 oz)   12/11/23 0500 (!) 160 kg (353 lb 13.4 oz)   12/10/23 0532 (!) 162 kg (356 lb 7.7 oz)   12/09/23 0500 (!) 151 kg (332 lb 10.8 oz)   12/08/23 0500 (!) 153 kg (336 lb 13.8 oz)   12/06/23 0500 (!) 154 kg (340 lb 6.2 oz)   12/05/23 0607 (!) 161 kg (354 lb 15.1 oz)   12/04/23 0500 (!) 161 kg (354 lb 4.5 oz)   12/03/23 0400 (!) 161 kg (354 lb 11.5 oz)   11/21/23 1155 (!) 162 kg (357 lb 12.8 oz)   11/09/23 0500 (!) 160 kg (353 lb 9.9 oz)   11/06/23 1422 (!) 158 kg (348 lb 5.2 oz)   11/02/23 1155 (!) 158 kg (348 lb 15.8 oz)   09/11/23 0030 (!) 151 kg (334 lb)   09/10/23 1844 (!) 154 kg (338 lb 10 oz)   08/30/23 1112 (!) 157 kg (347 lb)   08/01/23 0932 (!) 158 kg (347 lb 10.7 oz)   07/31/23 2347 (!) 163 kg (358 lb 7.5 oz)   06/16/23 2333 (!) 155 kg (342 lb 2.5 oz)   06/16/23 1053 (!) 155 kg (342 lb 3.2 oz)   06/15/23 0505 (!) 149 kg (328 lb 14.4 oz)   06/14/23 0455 (!) 149 kg (328 lb 4.8 oz)   06/13/23 1703 (!) 149 kg (328 lb 11.2 oz)   06/13/23 0600 (!) 170 kg (374 lb 12.5 oz)   06/12/23 0420 (!) 160 kg (352 lb 11.8 oz)   06/11/23 0447 (!) 150 kg (331 lb 5.6 oz)   06/10/23 0500 (!) 150 kg (330 lb 14.6 oz)   06/09/23 0536 (!) 156 kg (343 lb 7.6 oz)   06/08/23 1826 (!) 156 kg (344 lb 11.2 oz)   06/08/23 0522 (!) 158 kg (348 lb 8.8 oz)   06/07/23 0548 (!) 155 kg (342 lb 9.5 oz)   06/06/23 0515 (!) 155 kg (341 lb 14.9 oz)   06/05/23 0445 (!) 155 kg (341 lb 9.6 oz)   06/05/23 0348 (!) 158 kg (349 lb 3.3 oz)   06/04/23 0555 (!) 157 kg (346 lb 3.2 oz)   06/03/23 0539 (!) 158 kg (349 lb)   06/02/23 0548 (!) 163 kg (359 lb 3.2 oz)   06/01/23 0600 (!) 164 kg (362 lb)   05/31/23 0507 (!) 164 kg (362 lb 4.8 oz)   05/30/23 0635 (!) 164 kg (362 lb 7 oz)    05/29/23 0500 (!) 167 kg (368 lb 2.7 oz)   05/28/23 0600 (!) 167 kg (367 lb 11.6 oz)   05/27/23 0600 (!) 167 kg (369 lb 0.8 oz)   05/26/23 0529 (!) 167 kg (369 lb 3.2 oz)   05/25/23 0600 (!) 168 kg (370 lb 3.2 oz)   05/24/23 0600 (!) 166 kg (366 lb 9.6 oz)   05/22/23 0529 (!) 169 kg (373 lb 7.4 oz)   05/21/23 0600 (!) 169 kg (371 lb 12.8 oz)   05/20/23 0600 (!) 171 kg (376 lb 5.2 oz)   05/19/23 0300 (!) 168 kg (370 lb 14.4 oz)   05/18/23 1912 (!) 168 kg (371 lb 7.6 oz)   05/18/23 0600 (!) 169 kg (371 lb 11.1 oz)   05/16/23 0700 (!) 171 kg (377 lb 4.8 oz)   05/14/23 0500 (!) 171 kg (377 lb 3.3 oz)   05/12/23 1143 (!) 170 kg (375 lb)   05/06/23 0258 (!) 170 kg (375 lb 8 oz)   04/19/23 0909 (!) 163 kg (359 lb)   04/03/23 1906 (!) 168 kg (370 lb)   03/27/23 0938 (!) 170 kg (373 lb 10.9 oz)   03/17/23 1153 (!) 168 kg (370 lb)   01/27/23 1501 (!) 168 kg (370 lb)   12/22/22 1501 (!) 171 kg (376 lb)   11/08/22 1035 (!) 161 kg (355 lb)   10/01/22 1141 (!) 164 kg (360 lb 10.8 oz)   05/18/22 1311 (!) 155 kg (341 lb)   03/24/22 1432 (!) 155 kg (341 lb)   03/02/22 1412 (!) 155 kg (341 lb)   01/12/22 1317 (!) 155 kg (341 lb)   12/30/21 1431 (!) 155 kg (341 lb)   12/01/21 1144 (!) 155 kg (341 lb 11.4 oz)   12/01/21 0843 (!) 157 kg (346 lb)   11/22/21 0839 (!) 157 kg (346 lb)   11/15/21 1148 (!) 157 kg (346 lb 12.5 oz)   11/09/21 1139 (!) 157 kg (345 lb 7.4 oz)   11/05/21 1130 (!) 159 kg (351 lb 3.1 oz)   11/02/21 1121 (!) 161 kg (356 lb)   10/27/21 1048 (!) 161 kg (356 lb)   10/21/21 1418 (!) 162 kg (356 lb 14.8 oz)          Wt Change Observation Wt loss on Ozempic     Estimated/Assessed Needs  Estimated Needs based on: Adjusted Body Weight       Energy Requirements 25 kcal/kg    EST Needs (kcal/day) 2600       Protein Requirements 1.0-1.2 g/kg   EST Daily Needs (g/day) 104-125       Fluid Requirements 25 ml/kg    Estimated Needs (mL/day) 2600     Labs/Medications         Pertinent Labs Reviewed.   Results from last 7 days   Lab  Units 04/02/24  0450   SODIUM mmol/L 136   POTASSIUM mmol/L 4.0   CHLORIDE mmol/L 106   CO2 mmol/L 22.8   BUN mg/dL 13   CREATININE mg/dL 0.48*   CALCIUM mg/dL 8.4*   GLUCOSE mg/dL 114*     Results from last 7 days   Lab Units 04/02/24  0450   HEMOGLOBIN g/dL 11.7*   HEMATOCRIT % 36.5*     COVID19   Date Value Ref Range Status   03/11/2024 Not Detected Not Detected - Ref. Range Final     Lab Results   Component Value Date    HGBA1C 5.50 01/31/2024         Pertinent Medications Reviewed.     Malnutrition Severity Assessment              Nutrition Diagnosis         Nutrition Dx Problem 1 Increased nutrient needs related to increased protein demand as evidenced by impaired skin integrity.     Nutrition Intervention           Current Nutrition Orders & Evaluation of Intake       Current PO Diet Diet: Diabetic Diets, High Protein Diet; Consistent Carbohydrate; Texture: Regular Texture (IDDSI 7); Fluid Consistency: Thin (IDDSI 0)   Supplement No active supplement orders           Nutrition Intervention/Prescription        High protein diet         Medical Nutrition Therapy/Nutrition Education          Learner     Readiness Patient  Education not indicated at this time     Method     Response N/A  N/A     Monitor/Evaluation        Monitor PO intake, Weight, Skin status     Nutrition Discharge Plan         Continue high protein diet upon discharge      Electronically signed by:  Tiff Orellana RD  04/04/24 09:58 EDT

## 2024-04-05 LAB
GLUCOSE BLDC GLUCOMTR-MCNC: 129 MG/DL (ref 70–99)
GLUCOSE BLDC GLUCOMTR-MCNC: 171 MG/DL (ref 70–99)
GLUCOSE BLDC GLUCOMTR-MCNC: 182 MG/DL (ref 70–99)
GLUCOSE BLDC GLUCOMTR-MCNC: 95 MG/DL (ref 70–99)

## 2024-04-05 PROCEDURE — 82948 REAGENT STRIP/BLOOD GLUCOSE: CPT | Performed by: INTERNAL MEDICINE

## 2024-04-05 PROCEDURE — 63710000001 INSULIN DETEMIR PER 5 UNITS: Performed by: PHYSICIAN ASSISTANT

## 2024-04-05 PROCEDURE — 82948 REAGENT STRIP/BLOOD GLUCOSE: CPT

## 2024-04-05 PROCEDURE — 63710000001 INSULIN LISPRO (HUMAN) PER 5 UNITS: Performed by: INTERNAL MEDICINE

## 2024-04-05 RX ADMIN — BACLOFEN 10 MG: 10 TABLET ORAL at 10:36

## 2024-04-05 RX ADMIN — INSULIN DETEMIR 40 UNITS: 100 INJECTION, SOLUTION SUBCUTANEOUS at 08:21

## 2024-04-05 RX ADMIN — WHITE PETROLATUM 1 APPLICATION: 1.75 OINTMENT TOPICAL at 10:36

## 2024-04-05 RX ADMIN — PREGABALIN 100 MG: 100 CAPSULE ORAL at 16:16

## 2024-04-05 RX ADMIN — WHITE PETROLATUM 1 APPLICATION: 1.75 OINTMENT TOPICAL at 22:45

## 2024-04-05 RX ADMIN — BACLOFEN 10 MG: 10 TABLET ORAL at 01:55

## 2024-04-05 RX ADMIN — OXYCODONE 5 MG: 5 TABLET ORAL at 19:19

## 2024-04-05 RX ADMIN — APIXABAN 5 MG: 5 TABLET, FILM COATED ORAL at 20:42

## 2024-04-05 RX ADMIN — BACLOFEN 10 MG: 10 TABLET ORAL at 20:43

## 2024-04-05 RX ADMIN — OXYCODONE 5 MG: 5 TABLET ORAL at 07:20

## 2024-04-05 RX ADMIN — FERROUS SULFATE TAB 325 MG (65 MG ELEMENTAL FE) 325 MG: 325 (65 FE) TAB at 08:20

## 2024-04-05 RX ADMIN — SERTRALINE HYDROCHLORIDE 50 MG: 50 TABLET ORAL at 20:42

## 2024-04-05 RX ADMIN — APIXABAN 5 MG: 5 TABLET, FILM COATED ORAL at 08:20

## 2024-04-05 RX ADMIN — INSULIN DETEMIR 40 UNITS: 100 INJECTION, SOLUTION SUBCUTANEOUS at 20:43

## 2024-04-05 RX ADMIN — Medication 500 MG: at 20:42

## 2024-04-05 RX ADMIN — PREGABALIN 100 MG: 100 CAPSULE ORAL at 20:43

## 2024-04-05 RX ADMIN — ATORVASTATIN CALCIUM 10 MG: 10 TABLET, FILM COATED ORAL at 20:42

## 2024-04-05 RX ADMIN — IBUPROFEN 400 MG: 400 TABLET ORAL at 04:47

## 2024-04-05 RX ADMIN — OXYCODONE AND ACETAMINOPHEN 1 TABLET: 10; 325 TABLET ORAL at 01:55

## 2024-04-05 RX ADMIN — EMPAGLIFLOZIN 10 MG: 10 TABLET, FILM COATED ORAL at 08:21

## 2024-04-05 RX ADMIN — INSULIN LISPRO 4 UNITS: 100 INJECTION, SOLUTION INTRAVENOUS; SUBCUTANEOUS at 11:31

## 2024-04-05 RX ADMIN — PREGABALIN 100 MG: 100 CAPSULE ORAL at 08:21

## 2024-04-05 RX ADMIN — Medication 500 MG: at 08:21

## 2024-04-05 RX ADMIN — OXYCODONE AND ACETAMINOPHEN 1 TABLET: 10; 325 TABLET ORAL at 20:42

## 2024-04-05 RX ADMIN — DICLOFENAC SODIUM 4 G: 10 GEL TOPICAL at 08:22

## 2024-04-05 RX ADMIN — DICLOFENAC SODIUM 4 G: 10 GEL TOPICAL at 11:32

## 2024-04-05 RX ADMIN — INSULIN LISPRO 4 UNITS: 100 INJECTION, SOLUTION INTRAVENOUS; SUBCUTANEOUS at 20:43

## 2024-04-05 RX ADMIN — CYANOCOBALAMIN TAB 500 MCG 1000 MCG: 500 TAB at 08:21

## 2024-04-05 RX ADMIN — OXYCODONE AND ACETAMINOPHEN 1 TABLET: 10; 325 TABLET ORAL at 10:36

## 2024-04-05 RX ADMIN — LISINOPRIL 2.5 MG: 2.5 TABLET ORAL at 08:20

## 2024-04-05 RX ADMIN — Medication 10 MG: at 20:42

## 2024-04-05 NOTE — PLAN OF CARE
Goal Outcome Evaluation:  Plan of Care Reviewed With: patient        Progress: no change  Outcome Evaluation: Patient alert and oriented x4. Given PRN Percocet and Baclofen at 0804 and again at 1741 for left hip pain and spasms rated 10/10. Med effective each occurance. Given PRN Ibuprofen at 0926 for left shoulder pain radiating to elbow rated 7/10. Left arm supported on pillows. Interventions effective. Voices needs. Con't current POC.

## 2024-04-05 NOTE — PLAN OF CARE
Goal Outcome Evaluation:  Plan of Care Reviewed With: patient           Outcome Evaluation: Patient Alert & oriented x3. able to make needs known. Patient complains of hip and shoulder pain. PRN pain medication administer. patient says pain medication helps. pain states he will be glad when he can get his hip surgery. order treatments completed. call light within reach of patient

## 2024-04-05 NOTE — PLAN OF CARE
Goal Outcome Evaluation:  Plan of Care Reviewed With: patient        Progress: improving     Patient alert and oriented x 4 and able to make needs known to staff. Given  PRN percocet and baclofen at 1036 for c/o left hip spasms. Pain rated 10/10. Patient reports meds not effective. Will continue with current care plan.

## 2024-04-06 LAB
GLUCOSE BLDC GLUCOMTR-MCNC: 101 MG/DL (ref 70–99)
GLUCOSE BLDC GLUCOMTR-MCNC: 103 MG/DL (ref 70–99)
GLUCOSE BLDC GLUCOMTR-MCNC: 105 MG/DL (ref 70–99)
GLUCOSE BLDC GLUCOMTR-MCNC: 128 MG/DL (ref 70–99)

## 2024-04-06 PROCEDURE — 63710000001 ONDANSETRON ODT 4 MG TABLET DISPERSIBLE: Performed by: INTERNAL MEDICINE

## 2024-04-06 PROCEDURE — 82948 REAGENT STRIP/BLOOD GLUCOSE: CPT | Performed by: INTERNAL MEDICINE

## 2024-04-06 PROCEDURE — 63710000001 INSULIN DETEMIR PER 5 UNITS: Performed by: PHYSICIAN ASSISTANT

## 2024-04-06 PROCEDURE — 82948 REAGENT STRIP/BLOOD GLUCOSE: CPT

## 2024-04-06 RX ORDER — OXYCODONE HYDROCHLORIDE 5 MG/1
5 TABLET ORAL EVERY 12 HOURS PRN
Status: DISPENSED | OUTPATIENT
Start: 2024-04-06 | End: 2024-04-11

## 2024-04-06 RX ADMIN — PREGABALIN 100 MG: 100 CAPSULE ORAL at 21:23

## 2024-04-06 RX ADMIN — DICLOFENAC SODIUM 4 G: 10 GEL TOPICAL at 18:26

## 2024-04-06 RX ADMIN — OXYCODONE 5 MG: 5 TABLET ORAL at 11:15

## 2024-04-06 RX ADMIN — PREGABALIN 100 MG: 100 CAPSULE ORAL at 16:22

## 2024-04-06 RX ADMIN — BACLOFEN 10 MG: 10 TABLET ORAL at 12:50

## 2024-04-06 RX ADMIN — OXYCODONE 5 MG: 5 TABLET ORAL at 03:26

## 2024-04-06 RX ADMIN — PREGABALIN 100 MG: 100 CAPSULE ORAL at 08:31

## 2024-04-06 RX ADMIN — BACLOFEN 10 MG: 10 TABLET ORAL at 22:07

## 2024-04-06 RX ADMIN — OXYCODONE AND ACETAMINOPHEN 1 TABLET: 10; 325 TABLET ORAL at 04:43

## 2024-04-06 RX ADMIN — CYANOCOBALAMIN TAB 500 MCG 1000 MCG: 500 TAB at 08:31

## 2024-04-06 RX ADMIN — WHITE PETROLATUM 1 APPLICATION: 1.75 OINTMENT TOPICAL at 21:30

## 2024-04-06 RX ADMIN — INSULIN DETEMIR 40 UNITS: 100 INJECTION, SOLUTION SUBCUTANEOUS at 21:24

## 2024-04-06 RX ADMIN — Medication 500 MG: at 08:31

## 2024-04-06 RX ADMIN — EMPAGLIFLOZIN 10 MG: 10 TABLET, FILM COATED ORAL at 08:31

## 2024-04-06 RX ADMIN — SERTRALINE HYDROCHLORIDE 50 MG: 50 TABLET ORAL at 21:25

## 2024-04-06 RX ADMIN — DICLOFENAC SODIUM 4 G: 10 GEL TOPICAL at 21:27

## 2024-04-06 RX ADMIN — APIXABAN 5 MG: 5 TABLET, FILM COATED ORAL at 21:23

## 2024-04-06 RX ADMIN — LISINOPRIL 2.5 MG: 2.5 TABLET ORAL at 08:31

## 2024-04-06 RX ADMIN — APIXABAN 5 MG: 5 TABLET, FILM COATED ORAL at 08:31

## 2024-04-06 RX ADMIN — INSULIN DETEMIR 40 UNITS: 100 INJECTION, SOLUTION SUBCUTANEOUS at 08:30

## 2024-04-06 RX ADMIN — OXYCODONE AND ACETAMINOPHEN 1 TABLET: 10; 325 TABLET ORAL at 21:23

## 2024-04-06 RX ADMIN — DICLOFENAC SODIUM 4 G: 10 GEL TOPICAL at 08:30

## 2024-04-06 RX ADMIN — Medication 500 MG: at 21:23

## 2024-04-06 RX ADMIN — FERROUS SULFATE TAB 325 MG (65 MG ELEMENTAL FE) 325 MG: 325 (65 FE) TAB at 08:31

## 2024-04-06 RX ADMIN — OXYCODONE AND ACETAMINOPHEN 1 TABLET: 10; 325 TABLET ORAL at 12:50

## 2024-04-06 RX ADMIN — ONDANSETRON 4 MG: 4 TABLET, ORALLY DISINTEGRATING ORAL at 12:55

## 2024-04-06 RX ADMIN — BACLOFEN 10 MG: 10 TABLET ORAL at 04:43

## 2024-04-06 RX ADMIN — IBUPROFEN 400 MG: 400 TABLET ORAL at 14:33

## 2024-04-06 RX ADMIN — ATORVASTATIN CALCIUM 10 MG: 10 TABLET, FILM COATED ORAL at 21:23

## 2024-04-06 RX ADMIN — Medication 10 MG: at 21:23

## 2024-04-06 NOTE — PLAN OF CARE
Goal Outcome Evaluation:  Plan of Care Reviewed With: patient        Progress: no change  Outcome Evaluation: resident alert, oriented, able to make needs known to staff. remains bedfast. Blood glucose stable stable for all 3 meals. 2 episodes of lethargy today, called out resident's name several times before resident was awake and alert enough to converse, one episode of drifting of in the middle of having conversation with this writer. no further episodes like this since lunch PA notified. Medicated with prn percocet and Baclofen x1, effective, medicated with prn Motrin x1, effective. used Voltaren cream this evening, refused at lunch. Resident pleasant and cooperative.

## 2024-04-06 NOTE — PLAN OF CARE
Goal Outcome Evaluation:           Progress: no change  Outcome Evaluation: Patient is alert and oriented x4, able to make needs known to staff.  Has been medicated x2 this shift with prn oxycodone and baclofen, and oxy IR; see emar.  Rsd. states medication effective.  Refuses to use bedpan this shift and has been incontinent of bowels.  Urinal remains at bedside.  Activity encouraged this shift, Rsd. has attempted to pull self up in bed with overhead trapeze bar.  Has not ambulated or gotten out of bed this shift, Refuses to be turned, but does shift weight frequently while in bed.  Refuses bed alert.  Bowels formed this shift.  Call light and personal items within reach, Rsd. reminded to use call light for assistance, verbalizes understanding.  WIll continue to monitor and notify on-coming staff.  Current plan of care remains in place at this time.

## 2024-04-07 LAB
GLUCOSE BLDC GLUCOMTR-MCNC: 116 MG/DL (ref 70–99)
GLUCOSE BLDC GLUCOMTR-MCNC: 163 MG/DL (ref 70–99)
GLUCOSE BLDC GLUCOMTR-MCNC: 87 MG/DL (ref 70–99)
GLUCOSE BLDC GLUCOMTR-MCNC: 97 MG/DL (ref 70–99)

## 2024-04-07 PROCEDURE — 82948 REAGENT STRIP/BLOOD GLUCOSE: CPT | Performed by: INTERNAL MEDICINE

## 2024-04-07 PROCEDURE — 63710000001 INSULIN LISPRO (HUMAN) PER 5 UNITS: Performed by: INTERNAL MEDICINE

## 2024-04-07 PROCEDURE — 63710000001 INSULIN DETEMIR PER 5 UNITS: Performed by: PHYSICIAN ASSISTANT

## 2024-04-07 PROCEDURE — 99309 SBSQ NF CARE MODERATE MDM 30: CPT | Performed by: PHYSICIAN ASSISTANT

## 2024-04-07 PROCEDURE — 82948 REAGENT STRIP/BLOOD GLUCOSE: CPT

## 2024-04-07 RX ORDER — FUROSEMIDE 40 MG/1
40 TABLET ORAL ONCE
Status: COMPLETED | OUTPATIENT
Start: 2024-04-07 | End: 2024-04-07

## 2024-04-07 RX ORDER — BACLOFEN 10 MG/1
10 TABLET ORAL 3 TIMES DAILY PRN
Status: DISPENSED | OUTPATIENT
Start: 2024-04-07 | End: 2024-04-27

## 2024-04-07 RX ADMIN — OXYCODONE AND ACETAMINOPHEN 1 TABLET: 10; 325 TABLET ORAL at 18:00

## 2024-04-07 RX ADMIN — EMPAGLIFLOZIN 10 MG: 10 TABLET, FILM COATED ORAL at 09:26

## 2024-04-07 RX ADMIN — DICLOFENAC SODIUM 4 G: 10 GEL TOPICAL at 12:27

## 2024-04-07 RX ADMIN — OXYCODONE 5 MG: 5 TABLET ORAL at 15:37

## 2024-04-07 RX ADMIN — ATORVASTATIN CALCIUM 10 MG: 10 TABLET, FILM COATED ORAL at 20:23

## 2024-04-07 RX ADMIN — BACLOFEN 10 MG: 10 TABLET ORAL at 09:25

## 2024-04-07 RX ADMIN — APIXABAN 5 MG: 5 TABLET, FILM COATED ORAL at 09:25

## 2024-04-07 RX ADMIN — SERTRALINE HYDROCHLORIDE 50 MG: 50 TABLET ORAL at 20:22

## 2024-04-07 RX ADMIN — DICLOFENAC SODIUM 4 G: 10 GEL TOPICAL at 20:25

## 2024-04-07 RX ADMIN — DICLOFENAC SODIUM 4 G: 10 GEL TOPICAL at 09:28

## 2024-04-07 RX ADMIN — APIXABAN 5 MG: 5 TABLET, FILM COATED ORAL at 20:22

## 2024-04-07 RX ADMIN — WHITE PETROLATUM 1 APPLICATION: 1.75 OINTMENT TOPICAL at 22:49

## 2024-04-07 RX ADMIN — DICLOFENAC SODIUM 4 G: 10 GEL TOPICAL at 18:00

## 2024-04-07 RX ADMIN — PREGABALIN 100 MG: 100 CAPSULE ORAL at 09:25

## 2024-04-07 RX ADMIN — BACLOFEN 10 MG: 10 TABLET ORAL at 18:00

## 2024-04-07 RX ADMIN — FUROSEMIDE 40 MG: 40 TABLET ORAL at 12:26

## 2024-04-07 RX ADMIN — INSULIN LISPRO 4 UNITS: 100 INJECTION, SOLUTION INTRAVENOUS; SUBCUTANEOUS at 20:21

## 2024-04-07 RX ADMIN — Medication: at 20:20

## 2024-04-07 RX ADMIN — FERROUS SULFATE TAB 325 MG (65 MG ELEMENTAL FE) 325 MG: 325 (65 FE) TAB at 09:25

## 2024-04-07 RX ADMIN — Medication 500 MG: at 09:25

## 2024-04-07 RX ADMIN — CYANOCOBALAMIN TAB 500 MCG 1000 MCG: 500 TAB at 09:25

## 2024-04-07 RX ADMIN — OXYCODONE AND ACETAMINOPHEN 1 TABLET: 10; 325 TABLET ORAL at 06:05

## 2024-04-07 RX ADMIN — INSULIN DETEMIR 40 UNITS: 100 INJECTION, SOLUTION SUBCUTANEOUS at 09:31

## 2024-04-07 RX ADMIN — PREGABALIN 100 MG: 100 CAPSULE ORAL at 15:37

## 2024-04-07 RX ADMIN — Medication 10 MG: at 20:22

## 2024-04-07 RX ADMIN — IBUPROFEN 400 MG: 400 TABLET ORAL at 12:30

## 2024-04-07 RX ADMIN — Medication 500 MG: at 20:22

## 2024-04-07 RX ADMIN — LISINOPRIL 2.5 MG: 2.5 TABLET ORAL at 09:25

## 2024-04-07 RX ADMIN — PREGABALIN 100 MG: 100 CAPSULE ORAL at 20:22

## 2024-04-07 RX ADMIN — WHITE PETROLATUM 1 APPLICATION: 1.75 OINTMENT TOPICAL at 09:29

## 2024-04-07 RX ADMIN — INSULIN DETEMIR 40 UNITS: 100 INJECTION, SOLUTION SUBCUTANEOUS at 20:21

## 2024-04-07 RX ADMIN — OXYCODONE 5 MG: 5 TABLET ORAL at 03:33

## 2024-04-07 NOTE — PROGRESS NOTES
[facility]   Hospitalist Progress Note  Date: 2024  Patient Name: Jose Shaikh  : 1960  MRN: [id number]  Date of admission: 2023      Subjective   Subjective     Chief Complaint: Weakness    Summary: Jose Shaikh is a 63 y.o. male  initially hospitalized on 9/10/2023, prolonged hospitalization for treatment of management of generalized weakness, deconditioning, with acute issues of diarrhea, C. difficile negative.  Diarrhea improved.  Had small hematoma after ambulating on his foot.  Unfortunately with exhaustive efforts to try to place this gentleman after 39 days of hospitalization, we are unable to find a facility that will accept him.  He has no further acute needs or requirements for inpatient monitoring and management, his labs and vitals are stable, he is tolerating oral intake, and has been refusing turns and repositionings and other interventions with nursing staff despite recommendations.  Patient discharged in hemodynamically stable condition on 10/19/2023 to follow-up with PCP within 1 week. Unfortunately we cannot solve all of his social issues during this hospitalization due to lack of resources and participation from the patient's perspective despite all our efforts.  Patient has a financial means of making arrangements at home.  Patient appealed his discharge.  Lost his appeal.  LCD: 10/20/23, PFL beginning: 10/21/23 @ 12pm.  Due to an inability to place the patient in a.m. nursing home he has been placed in our skilled nursing facility until arrangements can be made or until he is able to care for himself.      Interval Followup: 2024    Patient reports feeling full and not eating as much.  Has been losing weight on Ozempic.  Tolerating reasonably well.    He is hoping to ambulate more as he continues to lose weight.  Is been seen by orthopedist, who is recommending continued weight loss prior to any surgical consideration.    Wound care following   Glucose controlled    Vital signs stable   Medically stable   Orthopedist consulted and recommending continued weight loss for now   Venous stasis with some additional lower extremity edema noted.  Will dose Lasix x 1.      Review of systems: All systems reviewed and negative except what is noted above in interval follow-up   Objective   Objective     Vitals:   Temp:  [97.5 °F (36.4 °C)-97.7 °F (36.5 °C)] 97.5 °F (36.4 °C)  Heart Rate:  [70-71] 71  Resp:  [16] 16  BP: (104-105)/(49-53) 104/49    Physical Exam   Constitutional: No distress.  Alert and conversational.  Respiratory: No wheeze noted.  GI: Abdomen: Obese  Neuro/psych:  Calm mood.  No dysarthria.  Extremities: Some lower extremity edema noted    Result Review    Result Review:  I have personally reviewed the results from the time of this admission to 4/7/2024 10:46 EDT and agree with these findings:  [x]  Laboratory  [x]  Microbiology  []  Radiology  []  EKG/Telemetry   []  Cardiology/Vascular   []  Pathology  []  Old records  []  Other:    Assessment & Plan   Assessment / Plan   Assessment:  Hospital-acquired weakness  Hx of Influenza A  Hx of C. difficile diarrhea treated  Generalized weakness  Deconditioning  DM (Kami Garcia and PCP)  HTN  PAF (on Eliquis; previously seen Dr. 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  CMP/CBC in the morning.  Float heels/frequent position changes  Tolerating Ozempic at 0.5 mg weekly, recently increased to 0.5 Mg on 2/28.  As he continues to lose weight on Ozempic, he may be a candidate for surgical options  Appreciate input from wound care consult  As needed  Lasix  Continue basal insulin and SSI per protocol titrate as needed  Continue Eliquis for stroke prophylaxis  Continue probiotics  Discussed with RN    DVT prophylaxis:  Medical DVT prophylaxis orders are present.    CODE STATUS:   Code Status (Patient has no pulse and is not breathing): CPR (Attempt to Resuscitate)  Medical Interventions (Patient has pulse or is breathing): Full Support

## 2024-04-07 NOTE — PLAN OF CARE
Goal Outcome Evaluation:  Plan of Care Reviewed With: patient           Outcome Evaluation: AAOx4, VSS, remains on SSI with BG WDL, continues to refuse to get out of the bed and transfer to BSC or chair, consistent education given regarding skin care and floating BLE to avoid PIs, consistently seeks out staff with frequent requests for pain medication, can be demanding, rude and disrespectful to staff, makes very little attempt for self care and ADLs, education provided to patient the importance of getting OOB and at least up to BSC/chair, no c/o of SOA, N/V/D, no acute events this shift, continue with current POC at this time.

## 2024-04-08 VITALS
HEIGHT: 74 IN | HEART RATE: 75 BPM | DIASTOLIC BLOOD PRESSURE: 52 MMHG | SYSTOLIC BLOOD PRESSURE: 113 MMHG | BODY MASS INDEX: 40.43 KG/M2 | WEIGHT: 315 LBS | OXYGEN SATURATION: 95 % | TEMPERATURE: 98.2 F | RESPIRATION RATE: 18 BRPM

## 2024-04-08 LAB
ALBUMIN SERPL-MCNC: 2.8 G/DL (ref 3.5–5.2)
ALBUMIN/GLOB SERPL: 1 G/DL
ALP SERPL-CCNC: 246 U/L (ref 39–117)
ALT SERPL W P-5'-P-CCNC: 36 U/L (ref 1–41)
ANION GAP SERPL CALCULATED.3IONS-SCNC: 6.8 MMOL/L (ref 5–15)
AST SERPL-CCNC: 50 U/L (ref 1–40)
BASOPHILS # BLD AUTO: 0.04 10*3/MM3 (ref 0–0.2)
BASOPHILS NFR BLD AUTO: 1 % (ref 0–1.5)
BILIRUB SERPL-MCNC: 0.5 MG/DL (ref 0–1.2)
BUN SERPL-MCNC: 16 MG/DL (ref 8–23)
BUN/CREAT SERPL: 30.2 (ref 7–25)
CALCIUM SPEC-SCNC: 8.3 MG/DL (ref 8.6–10.5)
CHLORIDE SERPL-SCNC: 105 MMOL/L (ref 98–107)
CO2 SERPL-SCNC: 26.2 MMOL/L (ref 22–29)
CREAT SERPL-MCNC: 0.53 MG/DL (ref 0.76–1.27)
DEPRECATED RDW RBC AUTO: 50.7 FL (ref 37–54)
EGFRCR SERPLBLD CKD-EPI 2021: 111.2 ML/MIN/1.73
EOSINOPHIL # BLD AUTO: 0.1 10*3/MM3 (ref 0–0.4)
EOSINOPHIL NFR BLD AUTO: 2.4 % (ref 0.3–6.2)
ERYTHROCYTE [DISTWIDTH] IN BLOOD BY AUTOMATED COUNT: 15.4 % (ref 12.3–15.4)
GLOBULIN UR ELPH-MCNC: 2.7 GM/DL
GLUCOSE BLDC GLUCOMTR-MCNC: 114 MG/DL (ref 70–99)
GLUCOSE BLDC GLUCOMTR-MCNC: 138 MG/DL (ref 70–99)
GLUCOSE BLDC GLUCOMTR-MCNC: 154 MG/DL (ref 70–99)
GLUCOSE BLDC GLUCOMTR-MCNC: 165 MG/DL (ref 70–99)
GLUCOSE BLDC GLUCOMTR-MCNC: 232 MG/DL (ref 70–99)
GLUCOSE SERPL-MCNC: 127 MG/DL (ref 65–99)
HCT VFR BLD AUTO: 36.7 % (ref 37.5–51)
HGB BLD-MCNC: 11.5 G/DL (ref 13–17.7)
IMM GRANULOCYTES # BLD AUTO: 0.01 10*3/MM3 (ref 0–0.05)
IMM GRANULOCYTES NFR BLD AUTO: 0.2 % (ref 0–0.5)
LYMPHOCYTES # BLD AUTO: 1.13 10*3/MM3 (ref 0.7–3.1)
LYMPHOCYTES NFR BLD AUTO: 26.9 % (ref 19.6–45.3)
MAGNESIUM SERPL-MCNC: 1.9 MG/DL (ref 1.6–2.4)
MCH RBC QN AUTO: 28.2 PG (ref 26.6–33)
MCHC RBC AUTO-ENTMCNC: 31.3 G/DL (ref 31.5–35.7)
MCV RBC AUTO: 90 FL (ref 79–97)
MONOCYTES # BLD AUTO: 0.68 10*3/MM3 (ref 0.1–0.9)
MONOCYTES NFR BLD AUTO: 16.2 % (ref 5–12)
NEUTROPHILS NFR BLD AUTO: 2.24 10*3/MM3 (ref 1.7–7)
NEUTROPHILS NFR BLD AUTO: 53.3 % (ref 42.7–76)
NRBC BLD AUTO-RTO: 0 /100 WBC (ref 0–0.2)
PLATELET # BLD AUTO: 87 10*3/MM3 (ref 140–450)
PMV BLD AUTO: 10.9 FL (ref 6–12)
POTASSIUM SERPL-SCNC: 4 MMOL/L (ref 3.5–5.2)
PROT SERPL-MCNC: 5.5 G/DL (ref 6–8.5)
RBC # BLD AUTO: 4.08 10*6/MM3 (ref 4.14–5.8)
SODIUM SERPL-SCNC: 138 MMOL/L (ref 136–145)
WBC NRBC COR # BLD AUTO: 4.2 10*3/MM3 (ref 3.4–10.8)

## 2024-04-08 PROCEDURE — 83735 ASSAY OF MAGNESIUM: CPT | Performed by: PHYSICIAN ASSISTANT

## 2024-04-08 PROCEDURE — 82948 REAGENT STRIP/BLOOD GLUCOSE: CPT | Performed by: INTERNAL MEDICINE

## 2024-04-08 PROCEDURE — 63710000001 INSULIN LISPRO (HUMAN) PER 5 UNITS: Performed by: INTERNAL MEDICINE

## 2024-04-08 PROCEDURE — 80053 COMPREHEN METABOLIC PANEL: CPT | Performed by: PHYSICIAN ASSISTANT

## 2024-04-08 PROCEDURE — 85025 COMPLETE CBC W/AUTO DIFF WBC: CPT | Performed by: PHYSICIAN ASSISTANT

## 2024-04-08 PROCEDURE — 63710000001 INSULIN DETEMIR PER 5 UNITS: Performed by: PHYSICIAN ASSISTANT

## 2024-04-08 PROCEDURE — 82948 REAGENT STRIP/BLOOD GLUCOSE: CPT

## 2024-04-08 RX ADMIN — PREGABALIN 100 MG: 100 CAPSULE ORAL at 08:05

## 2024-04-08 RX ADMIN — Medication: at 20:14

## 2024-04-08 RX ADMIN — INSULIN DETEMIR 40 UNITS: 100 INJECTION, SOLUTION SUBCUTANEOUS at 20:17

## 2024-04-08 RX ADMIN — BACLOFEN 10 MG: 10 TABLET ORAL at 02:08

## 2024-04-08 RX ADMIN — DICLOFENAC SODIUM 4 G: 10 GEL TOPICAL at 13:00

## 2024-04-08 RX ADMIN — OXYCODONE AND ACETAMINOPHEN 1 TABLET: 10; 325 TABLET ORAL at 10:09

## 2024-04-08 RX ADMIN — OXYCODONE AND ACETAMINOPHEN 1 TABLET: 10; 325 TABLET ORAL at 20:14

## 2024-04-08 RX ADMIN — OXYCODONE AND ACETAMINOPHEN 1 TABLET: 10; 325 TABLET ORAL at 02:08

## 2024-04-08 RX ADMIN — EMPAGLIFLOZIN 10 MG: 10 TABLET, FILM COATED ORAL at 08:05

## 2024-04-08 RX ADMIN — Medication 500 MG: at 20:15

## 2024-04-08 RX ADMIN — INSULIN LISPRO 4 UNITS: 100 INJECTION, SOLUTION INTRAVENOUS; SUBCUTANEOUS at 20:29

## 2024-04-08 RX ADMIN — APIXABAN 5 MG: 5 TABLET, FILM COATED ORAL at 20:14

## 2024-04-08 RX ADMIN — INSULIN LISPRO 4 UNITS: 100 INJECTION, SOLUTION INTRAVENOUS; SUBCUTANEOUS at 17:45

## 2024-04-08 RX ADMIN — APIXABAN 5 MG: 5 TABLET, FILM COATED ORAL at 08:05

## 2024-04-08 RX ADMIN — Medication 10 MG: at 20:15

## 2024-04-08 RX ADMIN — BACLOFEN 10 MG: 10 TABLET ORAL at 20:14

## 2024-04-08 RX ADMIN — CYANOCOBALAMIN TAB 500 MCG 1000 MCG: 500 TAB at 08:05

## 2024-04-08 RX ADMIN — BACLOFEN 10 MG: 10 TABLET ORAL at 10:09

## 2024-04-08 RX ADMIN — PREGABALIN 100 MG: 100 CAPSULE ORAL at 15:00

## 2024-04-08 RX ADMIN — IBUPROFEN 400 MG: 400 TABLET ORAL at 11:22

## 2024-04-08 RX ADMIN — WHITE PETROLATUM 1 APPLICATION: 1.75 OINTMENT TOPICAL at 10:09

## 2024-04-08 RX ADMIN — FERROUS SULFATE TAB 325 MG (65 MG ELEMENTAL FE) 325 MG: 325 (65 FE) TAB at 08:05

## 2024-04-08 RX ADMIN — Medication 500 MG: at 08:05

## 2024-04-08 RX ADMIN — ATORVASTATIN CALCIUM 10 MG: 10 TABLET, FILM COATED ORAL at 20:15

## 2024-04-08 RX ADMIN — ACETAMINOPHEN 650 MG: 325 TABLET ORAL at 00:12

## 2024-04-08 RX ADMIN — LISINOPRIL 2.5 MG: 2.5 TABLET ORAL at 08:05

## 2024-04-08 RX ADMIN — PREGABALIN 100 MG: 100 CAPSULE ORAL at 20:14

## 2024-04-08 RX ADMIN — SERTRALINE HYDROCHLORIDE 50 MG: 50 TABLET ORAL at 20:30

## 2024-04-08 RX ADMIN — INSULIN DETEMIR 40 UNITS: 100 INJECTION, SOLUTION SUBCUTANEOUS at 08:04

## 2024-04-08 RX ADMIN — DICLOFENAC SODIUM 4 G: 10 GEL TOPICAL at 08:06

## 2024-04-08 NOTE — PLAN OF CARE
Goal Outcome Evaluation:  Plan of Care Reviewed With: patient AAOX4, makes needs known, pt has multiple BM's a shift, incontinent at times. Rsd cont to refuse to turn off of back expect when he has a bowel movement. Pain med: Tylenol for headache @0012 -percocet and Baclofen  given @0208.    Call light and personal items on table @ bedside.

## 2024-04-08 NOTE — PLAN OF CARE
Goal Outcome Evaluation:  Plan of Care Reviewed With: patient        Progress: no change  Outcome Evaluation: Alert and oriented. Given Percocet and Baclofen for left hip pain rated 10/10 at 1009. Meds effective. Given PRN Ibuprofen at 1122 for left shoulder pain rated 8/10. Med effective. Pillow support provided to left arm. Refused pillows under BLE. Refused Voltaren scheduled at 1800. Voices needs. Con't current POC.

## 2024-04-09 LAB
GLUCOSE BLDC GLUCOMTR-MCNC: 118 MG/DL (ref 70–99)
GLUCOSE BLDC GLUCOMTR-MCNC: 123 MG/DL (ref 70–99)
GLUCOSE BLDC GLUCOMTR-MCNC: 142 MG/DL (ref 70–99)
GLUCOSE BLDC GLUCOMTR-MCNC: 165 MG/DL (ref 70–99)

## 2024-04-09 PROCEDURE — 63710000001 INSULIN LISPRO (HUMAN) PER 5 UNITS: Performed by: INTERNAL MEDICINE

## 2024-04-09 PROCEDURE — 82948 REAGENT STRIP/BLOOD GLUCOSE: CPT

## 2024-04-09 PROCEDURE — 63710000001 INSULIN DETEMIR PER 5 UNITS: Performed by: PHYSICIAN ASSISTANT

## 2024-04-09 PROCEDURE — 63710000001 ONDANSETRON ODT 4 MG TABLET DISPERSIBLE: Performed by: INTERNAL MEDICINE

## 2024-04-09 PROCEDURE — 82948 REAGENT STRIP/BLOOD GLUCOSE: CPT | Performed by: INTERNAL MEDICINE

## 2024-04-09 RX ORDER — TRIAMCINOLONE ACETONIDE 1 MG/G
1 OINTMENT TOPICAL EVERY 12 HOURS SCHEDULED
Status: DISCONTINUED | OUTPATIENT
Start: 2024-04-09 | End: 2024-04-23

## 2024-04-09 RX ADMIN — PREGABALIN 100 MG: 100 CAPSULE ORAL at 20:55

## 2024-04-09 RX ADMIN — PREGABALIN 100 MG: 100 CAPSULE ORAL at 08:08

## 2024-04-09 RX ADMIN — Medication 10 MG: at 20:54

## 2024-04-09 RX ADMIN — ATORVASTATIN CALCIUM 10 MG: 10 TABLET, FILM COATED ORAL at 20:54

## 2024-04-09 RX ADMIN — SERTRALINE HYDROCHLORIDE 50 MG: 50 TABLET ORAL at 20:55

## 2024-04-09 RX ADMIN — OXYCODONE AND ACETAMINOPHEN 1 TABLET: 10; 325 TABLET ORAL at 20:55

## 2024-04-09 RX ADMIN — BACLOFEN 10 MG: 10 TABLET ORAL at 20:55

## 2024-04-09 RX ADMIN — OXYCODONE AND ACETAMINOPHEN 1 TABLET: 10; 325 TABLET ORAL at 03:58

## 2024-04-09 RX ADMIN — INSULIN DETEMIR 40 UNITS: 100 INJECTION, SOLUTION SUBCUTANEOUS at 08:07

## 2024-04-09 RX ADMIN — BACLOFEN 10 MG: 10 TABLET ORAL at 03:58

## 2024-04-09 RX ADMIN — LISINOPRIL 2.5 MG: 2.5 TABLET ORAL at 08:08

## 2024-04-09 RX ADMIN — INSULIN DETEMIR 40 UNITS: 100 INJECTION, SOLUTION SUBCUTANEOUS at 20:54

## 2024-04-09 RX ADMIN — TRIAMCINOLONE ACETONIDE 1 APPLICATION: 1 OINTMENT TOPICAL at 13:03

## 2024-04-09 RX ADMIN — APIXABAN 5 MG: 5 TABLET, FILM COATED ORAL at 20:55

## 2024-04-09 RX ADMIN — APIXABAN 5 MG: 5 TABLET, FILM COATED ORAL at 08:08

## 2024-04-09 RX ADMIN — INSULIN LISPRO 4 UNITS: 100 INJECTION, SOLUTION INTRAVENOUS; SUBCUTANEOUS at 17:11

## 2024-04-09 RX ADMIN — PREGABALIN 100 MG: 100 CAPSULE ORAL at 16:26

## 2024-04-09 RX ADMIN — BACLOFEN 10 MG: 10 TABLET ORAL at 11:40

## 2024-04-09 RX ADMIN — TRIAMCINOLONE ACETONIDE 1 APPLICATION: 1 OINTMENT TOPICAL at 20:58

## 2024-04-09 RX ADMIN — FERROUS SULFATE TAB 325 MG (65 MG ELEMENTAL FE) 325 MG: 325 (65 FE) TAB at 08:08

## 2024-04-09 RX ADMIN — ONDANSETRON 4 MG: 4 TABLET, ORALLY DISINTEGRATING ORAL at 23:37

## 2024-04-09 RX ADMIN — Medication 500 MG: at 20:55

## 2024-04-09 RX ADMIN — OXYCODONE AND ACETAMINOPHEN 1 TABLET: 10; 325 TABLET ORAL at 11:40

## 2024-04-09 RX ADMIN — DICLOFENAC SODIUM 4 G: 10 GEL TOPICAL at 08:07

## 2024-04-09 RX ADMIN — IBUPROFEN 400 MG: 400 TABLET ORAL at 02:11

## 2024-04-09 RX ADMIN — EMPAGLIFLOZIN 10 MG: 10 TABLET, FILM COATED ORAL at 08:08

## 2024-04-09 RX ADMIN — Medication 500 MG: at 08:08

## 2024-04-09 RX ADMIN — DICLOFENAC SODIUM 4 G: 10 GEL TOPICAL at 20:53

## 2024-04-09 RX ADMIN — CYANOCOBALAMIN TAB 500 MCG 1000 MCG: 500 TAB at 08:08

## 2024-04-09 NOTE — PLAN OF CARE
Goal Outcome Evaluation:  Plan of Care Reviewed With: patient        Alert and oriented x 4. Patient able to make needs know to staff. Given Percocet and Baclofen for left hip pain rated 10/10 at 1140. Patient states meds were not effective.  Voltaren cream applied as directed left shoulder/elbow. Kenalog applied to left calf. Will continue with current plan of care.

## 2024-04-09 NOTE — SIGNIFICANT NOTE
Wound Eval / Progress Noted    KEO Dominguez     Patient Name: Jose Shaikh  : 1960  MRN: [id number]  Today's Date: 2024                 Admit Date: 2023    Visit Dx:    ICD-10-CM ICD-9-CM   1. Difficulty in walking  R26.2 719.7   2. Type 2 diabetes mellitus with other specified complication, unspecified whether long term insulin use  E11.69 250.80         Debility    Ulcer of right foot    Ulcerated, foot, left, limited to breakdown of skin    Onychomycosis        Past Medical History:   Diagnosis Date    Absence of toe of right foot     Acute osteomyelitis of left calcaneus  2021    Anxiety and depression     Arthritis     Cancer     Chronic pain     STATES HIS PAIN IS 10/10 AAT    Claustrophobia     Corns and callus     Diabetic ulcer of left heel associated with type 2 DM 2021    Diabetic ulcer of left heel associated with type 2 DM 2021    Diabetic ulcer of right midfoot associated with type 2 DM 2021    Difficulty walking     Essential hypertension 2021    Hammertoe     Hyperlipidemia LDL goal <100 2021    Ingrown toenail     Obesity     Paroxysmal atrial fibrillation 2021    Polyneuropathy     Pressure ulcer, stage 1     Tinea unguium     Type 2 diabetes mellitus with polyneuropathy         Past Surgical History:   Procedure Laterality Date    CYST REMOVAL      center of back; benign    EYE SURGERY      INCISION AND DRAINAGE ABSCESS      back    INCISION AND DRAINAGE LEG Right 12/10/2021    Procedure: INCISION AND DRAINAGE LOWER EXTREMITY;  Surgeon: Ash Leyva DPM;  Location: [facility] MAIN OR;  Service: Podiatry;  Laterality: Right;    OTHER SURGICAL HISTORY      Surgical clips left foot    TOE SURGERY Right     Removal of 5th toe    TRANS METATARSAL AMPUTATION Right 2021    Procedure: AMPUTATION TRANS METATARSAL;  Surgeon: Ash Leyva DPM;  Location: [facility] MAIN OR;  Service: Podiatry;  Laterality: Right;    VASCULAR  SURGERY      WRIST SURGERY Left     repair of injury         Physical Assessment:     04/09/24 0946   Wound 03/27/24 1045 Right anterior Skin Tear   Placement Date/Time: 03/27/24 1045   Side: Right  Orientation: anterior  Primary Wound Type: Skin Tear   Wound Image    Dressing Appearance intact;moist drainage   Closure None   Base red;moist;dry   Periwound intact;dry;pink   Periwound Temperature warm   Periwound Skin Turgor soft   Edges open   Wound Length (cm) 0.5 cm  (medial, 1.0 lateral)   Wound Width (cm) 0.1 cm  (medial, lateral 1.0)   Wound Depth (cm) 0.1 cm  (medial)   Wound Surface Area (cm^2) 0.05 cm^2   Wound Volume (cm^3) 0.005 cm^3   Drainage Characteristics/Odor serosanguineous   Drainage Amount small   Care, Wound cleansed with;irrigated with;sterile normal saline   Dressing Care dressing applied;border dressing;non-adherent;petroleum-based;silicone   Periwound Care absorptive dressing applied   Wound 03/28/24 1225 Right medial ankle Traumatic   Placement Date/Time: 03/28/24 1225   Side: Right  Orientation: medial  Location: ankle  Primary Wound Type: Traumatic   Wound Image    Dressing Appearance intact;no drainage   Closure None   Base pink;epithelialization;dry   Periwound intact;dry;pink   Periwound Temperature warm   Periwound Skin Turgor soft   Edges rolled/closed   Drainage Amount none   Care, Wound cleansed with;sterile normal saline   Dressing Care open to air   Wound 04/03/24 1730 Right anterior hip MASD (Moisture associated skin damage)   Placement Date/Time: 04/03/24 1730   Side: Right  Orientation: anterior  Location: hip  Primary Wound Type: MASD (Moisture associated skin damage)   Wound Image    Dressing Appearance open to air   Closure None   Base moist;red   Red (%), Wound Tissue Color 100   Periwound moist;pink;macerated   Periwound Temperature warm   Periwound Skin Turgor soft   Edges open   Drainage Amount none   Dressing Care open to air   Rash 04/06/24 1659 Left calf plaque    Placement Date/Time: 04/06/24 0050   Side: Left  Location: calf  Type: plaque  Additional Comments: raised red and warm area to lt calf   Wound Image    Distribution localized   Color red   Configuration/Shape asymmetric   Borders irregular;raised   Characteristics raised   Care, Rash cleansed with;sterile normal saline     Left posterior lower leg    Right posterior leg     Right anterior lower leg       Wound Check / Follow-up:  Patient seen today for wound follow-up / wound check. Patient is lying in bed resting quietly with eyes closed. He arouses easily to name and is agreeable to assessment.     Patient with resolved wound to right anterior ankle. Tissue with pink epithelialization. No open moist wound bed at present time. Cleansed with NS and gauze. Recommending to leave open to air and continue skin care / hygiene.     Right distal foot continues to have two open areas. There is a small are of open red moist tissue and then there is also a crusted darkened area. Cleansed with NS and gauze. Blotted dry. Recommending to continue daily dressing changes with non-adherent petroleum based gauze and silicone border dressings.     Patient has chronic discoloration noted to bilateral lower legs. There are areas to the right anterior lower leg, right posterior lower leg and left posterior lower leg that are reddened, shiny and raised. There is no weeping or drainage noted at present time. Cleansed with NS and gauze. Recommending application of steroid short term to assist with inflammation to this area. Patient has very limited activity and rarely sits on side of bed or gets out of bed. He has not been floating his heels and constantly rubs his lower legs across sheets and mattress. Recommending not only skin care / hygiene but also to increase physical activity / exercise to assist with venous return through muscle activity.     Erosion along right abdominal fold is much improved. There is only minimal redness  noted at this time, with no drainage. Recommending to continue skin care / hygiene and topical treatment.     Moisture and redness noted within bilateral groin and around scrotum. Recommending to continue skin care / hygiene.     Impression: Traumatic injury to right distal foot, chronic discolorations to bilateral lower legs with raised reddened areas. Erosion to abdominal crease on right. Moisture and redness within groin, scrotal area.    Short term goals:  Regain skin integrity. Topical treatments. Daily dressings, Skin care /hygiene. Moisture prevention.     Bettye Crews RN    4/9/2024    17:15 EDT

## 2024-04-09 NOTE — PLAN OF CARE
Goal Outcome Evaluation:  Plan of Care Reviewed With: patient VSS cont to require Pain medications for back hip and shoulder pain. Blood glucose better controlled, continues to refuse turns to prevent pressure areas. Multiple BM 's. No changes this shift

## 2024-04-10 LAB
GLUCOSE BLDC GLUCOMTR-MCNC: 122 MG/DL (ref 70–99)
GLUCOSE BLDC GLUCOMTR-MCNC: 131 MG/DL (ref 70–99)
GLUCOSE BLDC GLUCOMTR-MCNC: 155 MG/DL (ref 70–99)
GLUCOSE BLDC GLUCOMTR-MCNC: 85 MG/DL (ref 70–99)

## 2024-04-10 PROCEDURE — 63710000001 INSULIN DETEMIR PER 5 UNITS: Performed by: PHYSICIAN ASSISTANT

## 2024-04-10 PROCEDURE — 82948 REAGENT STRIP/BLOOD GLUCOSE: CPT

## 2024-04-10 PROCEDURE — 63710000001 INSULIN LISPRO (HUMAN) PER 5 UNITS: Performed by: INTERNAL MEDICINE

## 2024-04-10 PROCEDURE — 82948 REAGENT STRIP/BLOOD GLUCOSE: CPT | Performed by: INTERNAL MEDICINE

## 2024-04-10 RX ORDER — ATORVASTATIN CALCIUM 10 MG/1
10 TABLET, FILM COATED ORAL NIGHTLY
Status: DISPENSED | OUTPATIENT
Start: 2024-04-10

## 2024-04-10 RX ADMIN — PREGABALIN 100 MG: 100 CAPSULE ORAL at 17:21

## 2024-04-10 RX ADMIN — OXYCODONE AND ACETAMINOPHEN 1 TABLET: 10; 325 TABLET ORAL at 05:19

## 2024-04-10 RX ADMIN — PREGABALIN 100 MG: 100 CAPSULE ORAL at 08:09

## 2024-04-10 RX ADMIN — OXYCODONE 5 MG: 5 TABLET ORAL at 01:45

## 2024-04-10 RX ADMIN — DICLOFENAC SODIUM 4 G: 10 GEL TOPICAL at 21:03

## 2024-04-10 RX ADMIN — INSULIN DETEMIR 40 UNITS: 100 INJECTION, SOLUTION SUBCUTANEOUS at 21:03

## 2024-04-10 RX ADMIN — EMPAGLIFLOZIN 10 MG: 10 TABLET, FILM COATED ORAL at 08:10

## 2024-04-10 RX ADMIN — BACLOFEN 10 MG: 10 TABLET ORAL at 22:09

## 2024-04-10 RX ADMIN — INSULIN DETEMIR 40 UNITS: 100 INJECTION, SOLUTION SUBCUTANEOUS at 08:08

## 2024-04-10 RX ADMIN — DICLOFENAC SODIUM 4 G: 10 GEL TOPICAL at 17:23

## 2024-04-10 RX ADMIN — SERTRALINE HYDROCHLORIDE 50 MG: 50 TABLET ORAL at 21:03

## 2024-04-10 RX ADMIN — TRIAMCINOLONE ACETONIDE 1 APPLICATION: 1 OINTMENT TOPICAL at 08:13

## 2024-04-10 RX ADMIN — Medication 500 MG: at 21:02

## 2024-04-10 RX ADMIN — DICLOFENAC SODIUM 4 G: 10 GEL TOPICAL at 11:58

## 2024-04-10 RX ADMIN — Medication 500 MG: at 08:09

## 2024-04-10 RX ADMIN — BACLOFEN 10 MG: 10 TABLET ORAL at 13:46

## 2024-04-10 RX ADMIN — APIXABAN 5 MG: 5 TABLET, FILM COATED ORAL at 08:10

## 2024-04-10 RX ADMIN — LISINOPRIL 2.5 MG: 2.5 TABLET ORAL at 08:09

## 2024-04-10 RX ADMIN — FERROUS SULFATE TAB 325 MG (65 MG ELEMENTAL FE) 325 MG: 325 (65 FE) TAB at 08:10

## 2024-04-10 RX ADMIN — TRIAMCINOLONE ACETONIDE 1 APPLICATION: 1 OINTMENT TOPICAL at 21:03

## 2024-04-10 RX ADMIN — DICLOFENAC SODIUM 4 G: 10 GEL TOPICAL at 08:13

## 2024-04-10 RX ADMIN — APIXABAN 5 MG: 5 TABLET, FILM COATED ORAL at 21:02

## 2024-04-10 RX ADMIN — INSULIN LISPRO 4 UNITS: 100 INJECTION, SOLUTION INTRAVENOUS; SUBCUTANEOUS at 17:21

## 2024-04-10 RX ADMIN — PREGABALIN 100 MG: 100 CAPSULE ORAL at 21:03

## 2024-04-10 RX ADMIN — OXYCODONE AND ACETAMINOPHEN 1 TABLET: 10; 325 TABLET ORAL at 13:46

## 2024-04-10 RX ADMIN — ATORVASTATIN CALCIUM 10 MG: 10 TABLET, FILM COATED ORAL at 22:09

## 2024-04-10 RX ADMIN — Medication 10 MG: at 22:09

## 2024-04-10 RX ADMIN — CYANOCOBALAMIN TAB 500 MCG 1000 MCG: 500 TAB at 08:09

## 2024-04-10 RX ADMIN — BACLOFEN 10 MG: 10 TABLET ORAL at 05:19

## 2024-04-10 RX ADMIN — OXYCODONE AND ACETAMINOPHEN 1 TABLET: 10; 325 TABLET ORAL at 22:09

## 2024-04-10 NOTE — PROGRESS NOTES
"Nursing Facility Medication Regimen Review    Potentially Clinically Significant Medication Issues during this review: []Identified   [x]Not identified    Provider acknowledgement required?   []Yes   [x]No    Jose Shaikh is a 63 y.o.male admitted by Jose Maya MD , on 11/6/2023  1:34 PM , for Debility [R53.81]    Visit Vitals  /60 (BP Location: Left arm, Patient Position: Lying)   Pulse 84   Temp 97.3 °F (36.3 °C) (Oral)   Resp 18   Ht 188 cm (74.02\")   Wt (!) 157 kg (345 lb 0.3 oz)   SpO2 96%   BMI 44.28 kg/m²        Lab Results   Component Value Date    GLUCOSE 127 (H) 04/08/2024    BUN 16 04/08/2024    CREATININE 0.53 (L) 04/08/2024    EGFRIFNONA 134 12/20/2021    BCR 30.2 (H) 04/08/2024    K 4.0 04/08/2024    CO2 26.2 04/08/2024    CALCIUM 8.3 (L) 04/08/2024    ALBUMIN 2.8 (L) 04/08/2024    LABIL2 1.3 (L) 08/07/2019    AST 50 (H) 04/08/2024    ALT 36 04/08/2024    WBC 4.20 04/08/2024    HGB 11.5 (L) 04/08/2024    HCT 36.7 (L) 04/08/2024    MCV 90.0 04/08/2024    PLT 87 (L) 04/08/2024        Active Ambulatory Problems     Diagnosis Date Noted    Diabetic ulcer of left heel associated with type 2 DM 07/06/2021    Acute osteomyelitis of left calcaneus  08/18/2021    Diabetic ulcer of left heel associated with type 2 DM 08/18/2021    Diabetic ulcer of right midfoot associated with type 2 DM 08/18/2021    Paroxysmal atrial fibrillation 08/31/2021    Essential hypertension 08/31/2021    Hyperlipidemia LDL goal <100 08/31/2021    Cellulitis and abscess of foot 12/01/2021    High alkaline phosphatase level 12/19/2021    Osteomyelitis 10/01/2022    Onychomycosis 10/02/2022    Onychocryptosis 10/02/2022    Foot pain, bilateral 10/02/2022    Osteomyelitis of foot, right, acute 10/05/2022    Cellulitis of right foot 10/05/2022    Type 2 diabetes mellitus, with long-term current use of insulin 11/08/2022    Class 3 severe obesity due to excess calories with serious comorbidity and body mass index (BMI) of 45.0 to " 49.9 in adult 11/08/2022    Anxiety disorder, unspecified 10/07/2022    Claustrophobia 05/02/2022    Dependence on wheelchair 10/27/2022    Depression, unspecified 05/02/2022    Long term (current) use of anticoagulants 05/02/2022    Long term (current) use of oral hypoglycemic drugs 05/02/2022    Wound of foot 05/02/2022    Ulcer of right foot 05/02/2022    Orthostatic hypotension 10/07/2022    Other chronic osteomyelitis, right ankle and foot 10/07/2022    Personal history of nicotine dependence 05/02/2022    Thrombocytopenia, unspecified 10/07/2022    Unspecified open wound, right foot, initial encounter 04/03/2023    Diabetic foot infection 04/03/2023    Subacute osteomyelitis of right foot 04/06/2023    Right foot pain 05/12/2023    Sepsis 05/12/2023    Onychomycosis 05/22/2023    Foot pain, left 05/22/2023    Impaired mobility and ADLs 06/16/2023    Absence of toe of right foot 07/11/2023    Corns and callosity 07/11/2023    Disability of walking 07/11/2023    Fracture 07/11/2023    Limb swelling 07/11/2023    Polyneuropathy 07/11/2023    Pressure ulcer, stage 1 07/11/2023    Shortness of breath 07/11/2023    Generalized weakness 09/10/2023     Resolved Ambulatory Problems     Diagnosis Date Noted    Lactic acidosis 12/01/2021    Abscess of back 12/20/2021     Past Medical History:   Diagnosis Date    Anxiety and depression     Arthritis     Cancer     Chronic pain     Corns and callus     Difficulty walking     Hammertoe     Ingrown toenail     Obesity     Tinea unguium     Type 2 diabetes mellitus with polyneuropathy         Hospital Medications (active)         Dose Frequency Start End Indication    acetaminophen (TYLENOL) tablet 650 mg 650 mg Every 4 Hours PRN 3/18/2024 -- PRN mild pain, headache, fever    Admin Instructions: Based on patient request - if ordered for moderate or severe pain, provider allows for administration of a medication prescribed for a lower pain scale.    Do not exceed 4 grams of  acetaminophen in a 24 hr period. Max dose of 2gm for AST/ALT greater than 120 units/L.    If given for pain, use the following pain scale:   Mild Pain = Pain Score of 1-3, CPOT 1-2  Moderate Pain = Pain Score of 4-6, CPOT 3-4  Severe Pain = Pain Score of 7-10, CPOT 5-8     Route: Oral     apixaban (ELIQUIS) tablet 5 mg 5 mg Every 12 Hours Scheduled 11/6/2023 -- afib    Admin Instructions: Tablet may be crushed and suspended in 60 mL of water or D5W and immediately delivered via NG tube.     Route: Oral     atorvastatin (LIPITOR) tablet 10 mg 10 mg Nightly 11/6/2023 -- Hyperlipidemia     Admin Instructions: Avoid grapefruit juice.     Route: Oral     baclofen (LIORESAL) tablet 10 mg 10 mg 3 Times Daily PRN 4/7/2024 4/27/2024 PRN muscle spasms    Admin Instructions: Take with food if GI upset occurs.     Route: Oral     benzonatate (TESSALON) capsule 100 mg 100 mg 3 Times Daily PRN 1/18/2024 -- PRN cough     Admin Instructions: Do not crush or chew the capsules or tablets. The drug may not work as designed if the capsule or tablet is crushed or chewed. Swallow whole.  Swallow whole.  Do not crush, chew, or open capsule.     Route: Oral     bismuth subsalicylate (PEPTO BISMOL) chewable tablet 524 mg 524 mg 3 Times Daily PRN 2/5/2024 -- PRN indigestion    Admin Instructions: Maximum 4192 mg per 24 hours.     Route: Oral     dextrose (D50W) (25 g/50 mL) IV injection 25 g 25 g Every 15 Minutes PRN 11/6/2023 -- PRN hypoglycemia     Admin Instructions: Blood sugar less than 70; patient has IV access - Unresponsive, NPO or Unable To Safely Swallow     Route: Intravenous     dextrose (GLUTOSE) oral gel 15 g 15 g Every 15 Minutes PRN 11/6/2023 -- PRN hypoglycemia     Admin Instructions: BS<70, Patient Alert, Is not NPO, Can safely swallow.     Route: Oral     Diclofenac Sodium (VOLTAREN) 1 % gel 4 g 4 g 4 Times Daily 3/18/2024 -- Bilateral hip and knee pain    Admin Instructions: Apply to bilateral hip and knee   Do not cover  area with occlusive dressing or apply any other med to affected area. Do not wash affected area for 1 hr after applying. Use dosing card for correct dose measurement.     Route: Topical     Cosign for Ordering: Accepted by Cailin Acosta MD on 3/18/2024  4:32 PM     empagliflozin (JARDIANCE) tablet 10 mg 10 mg Daily 11/7/2023 -- DM type II    Route: Oral     ferrous sulfate tablet 325 mg 325 mg Daily With Breakfast 11/21/2023 -- Anemia/iron supplement     Admin Instructions: Swallow whole. Do not crush, split, or chew. Take with food if GI upset occurs.     Route: Oral     Cosign for Ordering: Accepted by Max Mehta MD on 11/21/2023 11:34 AM     glucagon (GLUCAGEN) injection 1 mg 1 mg Every 15 Minutes PRN 11/6/2023 -- PRN hyporlgycemia     Admin Instructions: Blood Glucose Less Than 70 - Patient Without IV Access - Unresponsive, NPO or Unable To Safely Swallow  Reconstitute powder for injection by adding 1 mL of -supplied sterile diluent or sterile water for injection to a vial containing 1 mg of the drug, to provide solutions containing 1 mg/mL. Shake vial gently to dissolve.     Route: Intramuscular     HOME MED Semaglutide(0.25 or 0.5MG/DOS) (OZEMPIC) solution pen-injector 0.5 mg 0.5 mg Every 7 Days 2/28/2024 -- DM type II    Admin Instructions: Please bring to pharmacy for identification and barcoding.     Notes to Pharmacy: Had medication filled as outpatient and we be using his own pen injector     Route: Subcutaneous     Cosign for Ordering: Accepted by Max Mehta MD on 2/27/2024 10:26 AM     Hydrocortisone (Perianal) (ANUSOL-HC) 2.5 % rectal cream  2 Times Daily PRN 3/20/2024 -- PRN hemorrhoids     Route: Rectal     Cosign for Ordering: Accepted by Cailin Acosta MD on 3/20/2024  4:51 PM     ibuprofen (ADVIL,MOTRIN) tablet 400 mg 400 mg 2 Times Daily PRN 1/16/2024 -- PRN mild pain    Admin Instructions: May alternate with tylenol  Based on patient request - if  ordered for moderate or severe pain, provider allows for administration of a medication prescribed for a lower pain scale.    Mucous membrane irritant. Do not crush or chew tablet or capsule unless administered through a feeding tube.  If given for pain, use the following pain scale:  Mild Pain = Pain Score of 1-3, CPOT 1-2  Moderate Pain = Pain Score of 4-6, CPOT 3-4  Severe Pain = Pain Score of 7-10, CPOT 5-8     Route: Oral     Cosign for Ordering: Accepted by Max Mehta MD on 1/17/2024 10:44 AM     insulin detemir (LEVEMIR) injection 40 Units 40 Units Daily 1/31/2024 -- DM type II    Admin Instructions: Do not hold basal insulin without an order. Consider requesting a dose edit, if needed.        Route: Subcutaneous     Cosign for Ordering: Accepted by Max Mehta MD on 1/31/2024 11:20 AM     insulin detemir (LEVEMIR) injection 40 Units 40 Units Nightly 1/30/2024 -- DM type II    Admin Instructions: Do not hold basal insulin without an order. Consider requesting a dose edit, if needed.        Route: Subcutaneous     Cosign for Ordering: Accepted by Max Mehta MD on 1/31/2024 11:20 AM     Insulin Lispro (humaLOG) injection 4-24 Units 4-24 Units 4 Times Daily Before Meals & Nightly 11/6/2023 -- DM type II    Admin Instructions: Correction Insulin - High Dose (Total Insulin Dose Greater Than 80 units/day, Insulin Resistant Patient, Patient With Infection, Steroid Treatment)    Blood Glucose 150-199 mg/dL - 4 units  Blood Glucose 200-249 mg/dL - 8 units  Blood Glucose 250-299 mg/dL - 12 units  Blood Glucose 300-349 mg/dL - 16 units  Blood Glucose 350-400 mg/dL - 20 units  Blood Glucose Greater Than 400 mg/dL - 24 units & Call Provider   Caution: Look alike/sound alike drug alert     Route: Subcutaneous     lisinopril (PRINIVIL,ZESTRIL) tablet 2.5 mg 2.5 mg Every 24 Hours Scheduled 11/7/2023 -- DM, hypertension     Admin Instructions: Hold for SBP less than 100     Route: Oral      "melatonin tablet 10 mg 10 mg Nightly 12/8/2023 -- Sleep maintenance     Route: Oral     ondansetron (ZOFRAN) injection 4 mg 4 mg Every 6 Hours PRN 11/6/2023 -- PRN nausea/vomiting     Admin Instructions: If BOTH ondansetron (ZOFRAN) and promethazine (PHENERGAN) are ordered use ondansetron first and THEN promethazine IF ondansetron is ineffective.     Route: Intravenous     ondansetron ODT (ZOFRAN-ODT) disintegrating tablet 4 mg 4 mg Every 6 Hours PRN 11/6/2023 -- PRN nausea/vomiting     Admin Instructions: \"If multiple N/V medications ordered, use in the following order: Ondansetron, Prochlorperazine, Promethazine. Use PO unless patient refuses or patient unable to swallow.\"  Place on tongue and allow to dissolve.     Route: Oral     oxyCODONE (ROXICODONE) immediate release tablet 5 mg 5 mg Every 12 Hours PRN 4/6/2024 4/11/2024 PRN moderate pain    Admin Instructions:     If given for pain, use the following pain scale:  Mild Pain = Pain Score of 1-3, CPOT 1-2  Moderate Pain = Pain Score of 4-6, CPOT 3-4  Severe Pain = Pain Score of 7-10, CPOT 5-8     Route: Oral     oxyCODONE-acetaminophen (PERCOCET)  MG per tablet 1 tablet 1 tablet Every 8 Hours PRN 4/2/2024 4/11/2024 PRN severe pain     Admin Instructions: Based on patient request - if ordered for moderate or severe pain, provider allows for administration of a medication prescribed for a lower pain scale.  [NIKIA]    Do not exceed 4 grams of acetaminophen in a 24 hr period. Max dose of 2gm for AST/ALT greater than 120 units/L        If given for pain, use the following pain scale:   Mild Pain = Pain Score of 1-3, CPOT 1-2  Moderate Pain = Pain Score of 4-6, CPOT 3-4  Severe Pain = Pain Score of 7-10, CPOT 5-8     Route: Oral     pneumococcal conj. 13-valent (PREVNAR-13) vaccine 0.5 mL 0.5 mL During Hospitalization 11/6/2023 -- Immunization     Admin Instructions:      Route: Intramuscular     pregabalin (LYRICA) capsule 100 mg 100 mg 3 Times Daily 11/7/2023 " -- Nerve pain/ DM type II    Admin Instructions:     Route: Oral     saccharomyces boulardii (FLORASTOR) capsule 500 mg 500 mg 2 Times Daily 2/3/2024 -- Gut health     Route: Oral     sertraline (ZOLOFT) tablet 50 mg 50 mg Nightly 2/18/2024 -- Anxiety disorder    Route: Oral     simethicone (MYLICON) chewable tablet 80 mg 80 mg 4 Times Daily PRN 11/6/2023 -- PRN flatulence     Route: Oral     sodium chloride 0.9 % flush 10 mL 10 mL As Needed 11/6/2023 -- Line care     Route: Intravenous     sodium chloride 0.9 % infusion 40 mL 40 mL As Needed 11/6/2023 -- Line care     Admin Instructions: Following administration of an IV intermittent medication, flush line with 40mL NS at 100mL/hr.     Route: Intravenous     triamcinolone (KENALOG) 0.1 % ointment 1 Application 1 Application Every 12 Hours Scheduled 4/9/2024 -- Skin care     Admin Instructions: Apply thin layer to skin irritation lower ext L calf etc     Route: Topical     Cosign for Ordering: Accepted by Max Mehta MD on 4/9/2024 12:06 PM     vitamin B-12 (CYANOCOBALAMIN) tablet 1,000 mcg 1,000 mcg Daily 11/7/2023 -- Dietary supplement, anemia     Route: Oral                Psychotropic Medications  sertraline    Potentially Clinically Significant Medication Issues/PharmD Recommendations:   Psychotropic Medication: established home meds   Opioid Use Review: 7 days autostop placed for review    Medication without an appropriate indication: none  Untreated indication: none  Missing Duration: none  Excessive or Inadequate Dose: none  Ineffective Drug Therapy: none  Medication Adverse Reactions/Consequences: none  Medication Monitoring: none   Drug Interactions: none  Duplicate Therapy: none  PTA Medication Omissions (not currently ordered): none  Safety: none      Additional notes: no significant medication issues found upon review of patient chart      In completing this Drug Regimen Review for NIMCO Hoffman, Katlyn Sanford, have reviewed the  electronic medical chart contents, including but not limited to the following: Medication Administration Records (MAR), Prescriber's orders, Progress, nursing and consultants' notes, Laboratory and diagnostic test results, Other sources of information about documented expressions or indications of distress and/or changes in condition.

## 2024-04-10 NOTE — PLAN OF CARE
Goal Outcome Evaluation:  Plan of Care Reviewed With: patient VSS, AOX4 , cont to c/o pain in back and L hip. Percocet and baclofen give x2 this shift., last dose at 0519, Oxycontin IR given at 0145. Four BM's this shift.  Call light and personal items within reach at bedside. No other complaints.

## 2024-04-10 NOTE — PLAN OF CARE
Goal Outcome Evaluation:  Plan of Care Reviewed With: patient        Progress: improving  Outcome Evaluation: Patient alert and oriented x4 . Pain medication given x1 this shift for pain to left hip. Meds effective. Barrier ceam applied to patients buttocks. Cream applied to left calf as ordered. Ozempic given. Patients required humalog x1 this shift for a blood glucose of 155. Volteran applied to left shoulder and left elbow. Patient has several bowel movements this shift. Patient refused his weight and wants to wait until Uyen ARIAS RN is here tomorrow to weigh. Continue current POC.

## 2024-04-11 LAB
GLUCOSE BLDC GLUCOMTR-MCNC: 110 MG/DL (ref 70–99)
GLUCOSE BLDC GLUCOMTR-MCNC: 114 MG/DL (ref 70–99)
GLUCOSE BLDC GLUCOMTR-MCNC: 120 MG/DL (ref 70–99)
GLUCOSE BLDC GLUCOMTR-MCNC: 136 MG/DL (ref 70–99)

## 2024-04-11 PROCEDURE — 82948 REAGENT STRIP/BLOOD GLUCOSE: CPT | Performed by: INTERNAL MEDICINE

## 2024-04-11 PROCEDURE — 63710000001 INSULIN DETEMIR PER 5 UNITS: Performed by: PHYSICIAN ASSISTANT

## 2024-04-11 PROCEDURE — 82948 REAGENT STRIP/BLOOD GLUCOSE: CPT

## 2024-04-11 RX ORDER — OXYCODONE AND ACETAMINOPHEN 10; 325 MG/1; MG/1
1 TABLET ORAL EVERY 8 HOURS PRN
Status: DISPENSED | OUTPATIENT
Start: 2024-04-11 | End: 2024-04-20

## 2024-04-11 RX ORDER — FERROUS SULFATE 325(65) MG
TABLET ORAL
Status: COMPLETED
Start: 2024-04-11 | End: 2024-04-11

## 2024-04-11 RX ORDER — LISINOPRIL 2.5 MG/1
TABLET ORAL
Status: DISPENSED
Start: 2024-04-11 | End: 2024-04-11

## 2024-04-11 RX ORDER — UREA 10 %
LOTION (ML) TOPICAL
Status: DISPENSED
Start: 2024-04-11 | End: 2024-04-11

## 2024-04-11 RX ADMIN — OXYCODONE AND ACETAMINOPHEN 1 TABLET: 10; 325 TABLET ORAL at 06:32

## 2024-04-11 RX ADMIN — DICLOFENAC SODIUM 4 G: 10 GEL TOPICAL at 09:32

## 2024-04-11 RX ADMIN — BACLOFEN 10 MG: 10 TABLET ORAL at 22:48

## 2024-04-11 RX ADMIN — OXYCODONE AND ACETAMINOPHEN 1 TABLET: 10; 325 TABLET ORAL at 14:53

## 2024-04-11 RX ADMIN — SERTRALINE HYDROCHLORIDE 50 MG: 50 TABLET ORAL at 20:43

## 2024-04-11 RX ADMIN — IBUPROFEN 400 MG: 400 TABLET ORAL at 03:54

## 2024-04-11 RX ADMIN — BACLOFEN 10 MG: 10 TABLET ORAL at 14:45

## 2024-04-11 RX ADMIN — FERROUS SULFATE TAB 325 MG (65 MG ELEMENTAL FE) 325 MG: 325 (65 FE) TAB at 09:33

## 2024-04-11 RX ADMIN — EMPAGLIFLOZIN 10 MG: 10 TABLET, FILM COATED ORAL at 09:34

## 2024-04-11 RX ADMIN — LISINOPRIL 2.5 MG: 2.5 TABLET ORAL at 09:29

## 2024-04-11 RX ADMIN — APIXABAN 5 MG: 5 TABLET, FILM COATED ORAL at 09:31

## 2024-04-11 RX ADMIN — Medication 500 MG: at 09:30

## 2024-04-11 RX ADMIN — DICLOFENAC SODIUM 4 G: 10 GEL TOPICAL at 12:05

## 2024-04-11 RX ADMIN — DICLOFENAC SODIUM 4 G: 10 GEL TOPICAL at 20:44

## 2024-04-11 RX ADMIN — ATORVASTATIN CALCIUM 10 MG: 10 TABLET, FILM COATED ORAL at 20:43

## 2024-04-11 RX ADMIN — PREGABALIN 100 MG: 100 CAPSULE ORAL at 20:44

## 2024-04-11 RX ADMIN — FERROUS SULFATE TAB 325 MG (65 MG ELEMENTAL FE) 325 MG: 325 (65 FE) TAB at 09:35

## 2024-04-11 RX ADMIN — OXYCODONE 5 MG: 5 TABLET ORAL at 02:24

## 2024-04-11 RX ADMIN — Medication 10 MG: at 20:43

## 2024-04-11 RX ADMIN — OXYCODONE AND ACETAMINOPHEN 1 TABLET: 10; 325 TABLET ORAL at 22:48

## 2024-04-11 RX ADMIN — CYANOCOBALAMIN TAB 500 MCG 1000 MCG: 500 TAB at 09:36

## 2024-04-11 RX ADMIN — APIXABAN 5 MG: 5 TABLET, FILM COATED ORAL at 20:43

## 2024-04-11 RX ADMIN — PREGABALIN 100 MG: 100 CAPSULE ORAL at 16:20

## 2024-04-11 RX ADMIN — IBUPROFEN 400 MG: 400 TABLET ORAL at 20:52

## 2024-04-11 RX ADMIN — TRIAMCINOLONE ACETONIDE 1 APPLICATION: 1 OINTMENT TOPICAL at 20:44

## 2024-04-11 RX ADMIN — BACLOFEN 10 MG: 10 TABLET ORAL at 06:32

## 2024-04-11 RX ADMIN — PREGABALIN 100 MG: 100 CAPSULE ORAL at 09:30

## 2024-04-11 RX ADMIN — Medication 500 MG: at 20:43

## 2024-04-11 RX ADMIN — INSULIN DETEMIR 40 UNITS: 100 INJECTION, SOLUTION SUBCUTANEOUS at 20:43

## 2024-04-11 RX ADMIN — EMPAGLIFLOZIN 10 MG: 10 TABLET, FILM COATED ORAL at 09:31

## 2024-04-11 RX ADMIN — INSULIN DETEMIR 40 UNITS: 100 INJECTION, SOLUTION SUBCUTANEOUS at 09:32

## 2024-04-11 RX ADMIN — TRIAMCINOLONE ACETONIDE 1 APPLICATION: 1 OINTMENT TOPICAL at 09:32

## 2024-04-11 NOTE — PLAN OF CARE
Goal Outcome Evaluation:  Plan of Care Reviewed With: patient        Progress: improving       Patient alert and oriented x4 . Baclofen and Percocet given x1 this shift for pain to left hip. Meds effective. Voltaren applied to left shoulder and left elbow. Patient has several bowel movements this shift. Patient weight this shift 161.8 kg.

## 2024-04-11 NOTE — PLAN OF CARE
Goal Outcome Evaluation:           Progress: no change    Outcome Evaluation: Patient is alert and oriented x4, able to make needs known to staff.  Has been medicated x1 this shift with prn oxycodone and baclofen, motrin, and oxyir -see emar.  Rsd. states medication effective.  Refuses to use bedpan this shift and has been incontinent of bowels.  Urinal remains at bedside.  Activity encouraged this shift, Rsd. has attempted to pull self up in bed with overhead trapeze bar.  Has not ambulated or gotten out of bed this shift, Refuses to be turned, but does shift weight frequently while in bed.  Refuses bed alert.  Bowels formed this shift.  Call light and personal items within reach, Rsd. reminded to use call light for assistance, verbalizes understanding.  WIll continue to monitor and notify on-coming staff.  Current plan of care remains in place at this time.

## 2024-04-12 LAB
GLUCOSE BLDC GLUCOMTR-MCNC: 111 MG/DL (ref 70–99)
GLUCOSE BLDC GLUCOMTR-MCNC: 121 MG/DL (ref 70–99)
GLUCOSE BLDC GLUCOMTR-MCNC: 121 MG/DL (ref 70–99)
GLUCOSE BLDC GLUCOMTR-MCNC: 142 MG/DL (ref 70–99)
GLUCOSE BLDC GLUCOMTR-MCNC: 175 MG/DL (ref 70–99)

## 2024-04-12 PROCEDURE — 63710000001 INSULIN DETEMIR PER 5 UNITS: Performed by: PHYSICIAN ASSISTANT

## 2024-04-12 PROCEDURE — 82948 REAGENT STRIP/BLOOD GLUCOSE: CPT

## 2024-04-12 PROCEDURE — 82948 REAGENT STRIP/BLOOD GLUCOSE: CPT | Performed by: INTERNAL MEDICINE

## 2024-04-12 PROCEDURE — 63710000001 ONDANSETRON ODT 4 MG TABLET DISPERSIBLE: Performed by: INTERNAL MEDICINE

## 2024-04-12 RX ADMIN — PREGABALIN 100 MG: 100 CAPSULE ORAL at 16:45

## 2024-04-12 RX ADMIN — CYANOCOBALAMIN TAB 500 MCG 1000 MCG: 500 TAB at 09:23

## 2024-04-12 RX ADMIN — INSULIN DETEMIR 40 UNITS: 100 INJECTION, SOLUTION SUBCUTANEOUS at 09:24

## 2024-04-12 RX ADMIN — OXYCODONE AND ACETAMINOPHEN 1 TABLET: 10; 325 TABLET ORAL at 17:13

## 2024-04-12 RX ADMIN — PREGABALIN 100 MG: 100 CAPSULE ORAL at 20:31

## 2024-04-12 RX ADMIN — ATORVASTATIN CALCIUM 10 MG: 10 TABLET, FILM COATED ORAL at 20:32

## 2024-04-12 RX ADMIN — ACETAMINOPHEN 650 MG: 325 TABLET ORAL at 13:43

## 2024-04-12 RX ADMIN — OXYCODONE AND ACETAMINOPHEN 1 TABLET: 10; 325 TABLET ORAL at 07:42

## 2024-04-12 RX ADMIN — DICLOFENAC SODIUM 4 G: 10 GEL TOPICAL at 20:32

## 2024-04-12 RX ADMIN — Medication 500 MG: at 09:20

## 2024-04-12 RX ADMIN — Medication 500 MG: at 20:32

## 2024-04-12 RX ADMIN — LISINOPRIL 2.5 MG: 2.5 TABLET ORAL at 09:23

## 2024-04-12 RX ADMIN — TRIAMCINOLONE ACETONIDE 1 APPLICATION: 1 OINTMENT TOPICAL at 09:24

## 2024-04-12 RX ADMIN — ONDANSETRON 4 MG: 4 TABLET, ORALLY DISINTEGRATING ORAL at 12:16

## 2024-04-12 RX ADMIN — DICLOFENAC SODIUM 4 G: 10 GEL TOPICAL at 17:13

## 2024-04-12 RX ADMIN — Medication 10 MG: at 20:32

## 2024-04-12 RX ADMIN — IBUPROFEN 400 MG: 400 TABLET ORAL at 22:45

## 2024-04-12 RX ADMIN — INSULIN DETEMIR 40 UNITS: 100 INJECTION, SOLUTION SUBCUTANEOUS at 20:31

## 2024-04-12 RX ADMIN — PREGABALIN 100 MG: 100 CAPSULE ORAL at 09:23

## 2024-04-12 RX ADMIN — EMPAGLIFLOZIN 10 MG: 10 TABLET, FILM COATED ORAL at 09:22

## 2024-04-12 RX ADMIN — BACLOFEN 10 MG: 10 TABLET ORAL at 17:13

## 2024-04-12 RX ADMIN — BACLOFEN 10 MG: 10 TABLET ORAL at 07:42

## 2024-04-12 RX ADMIN — DICLOFENAC SODIUM 4 G: 10 GEL TOPICAL at 12:16

## 2024-04-12 RX ADMIN — SERTRALINE HYDROCHLORIDE 50 MG: 50 TABLET ORAL at 20:32

## 2024-04-12 RX ADMIN — APIXABAN 5 MG: 5 TABLET, FILM COATED ORAL at 20:32

## 2024-04-12 RX ADMIN — DICLOFENAC SODIUM 4 G: 10 GEL TOPICAL at 09:21

## 2024-04-12 RX ADMIN — APIXABAN 5 MG: 5 TABLET, FILM COATED ORAL at 09:21

## 2024-04-12 RX ADMIN — IBUPROFEN 400 MG: 400 TABLET ORAL at 10:37

## 2024-04-12 RX ADMIN — TRIAMCINOLONE ACETONIDE 1 APPLICATION: 1 OINTMENT TOPICAL at 20:32

## 2024-04-12 RX ADMIN — FERROUS SULFATE TAB 325 MG (65 MG ELEMENTAL FE) 325 MG: 325 (65 FE) TAB at 09:22

## 2024-04-12 NOTE — PLAN OF CARE
Goal Outcome Evaluation:           Progress: no change  Outcome Evaluation: Patient is alert and oriented x4, able to make needs known to staff.  Has been medicated x1 this shift with prn oxycodone and baclofen, and motrin, see emar.  Rsd. states medication effective.  Bedpan utilized this shift, but patient remains incontinent of bowels at times. Urinal remains at bedside.  Activity encouraged this shift, Rsd. has attempted to pull self up in bed with overhead trapeze bar.  Has not ambulated or gotten out of bed this shift, Refuses to be turned, but does shift weight frequently while in bed.  Refuses bed alert.Call light and personal items within reach, Rsd. reminded to use call light for assistance, verbalizes understanding.  WIll continue to monitor and notify on-coming staff.  Current plan of care remains in place at this time.

## 2024-04-12 NOTE — PLAN OF CARE
Goal Outcome Evaluation:  Plan of Care Reviewed With: patient        Progress: no change  Outcome Evaluation: Patient is alert and oriented x4, but has been forgetful today. Given PRN Percocet  and Baclofen at 0742 and again at 1713 for left hip pain rated 10/10. Med effective this morning but patient stated it wore off too soon. Given PRN Tylenol at 1343 for left hip break through pain. Med effective. Given PRN Ibuprofen at 1037 for left shoulder pain 8/10. Med effective. Given PRN Zofran ODT at 1216 for nausea without emesis. Med effective. Voices needs. Con't current POC.

## 2024-04-13 LAB
GLUCOSE BLDC GLUCOMTR-MCNC: 111 MG/DL (ref 70–99)
GLUCOSE BLDC GLUCOMTR-MCNC: 121 MG/DL (ref 70–99)
GLUCOSE BLDC GLUCOMTR-MCNC: 125 MG/DL (ref 70–99)
GLUCOSE BLDC GLUCOMTR-MCNC: 87 MG/DL (ref 70–99)

## 2024-04-13 PROCEDURE — 82948 REAGENT STRIP/BLOOD GLUCOSE: CPT | Performed by: INTERNAL MEDICINE

## 2024-04-13 PROCEDURE — 63710000001 INSULIN DETEMIR PER 5 UNITS: Performed by: PHYSICIAN ASSISTANT

## 2024-04-13 PROCEDURE — 82948 REAGENT STRIP/BLOOD GLUCOSE: CPT

## 2024-04-13 RX ADMIN — DICLOFENAC SODIUM 4 G: 10 GEL TOPICAL at 18:12

## 2024-04-13 RX ADMIN — TRIAMCINOLONE ACETONIDE 1 APPLICATION: 1 OINTMENT TOPICAL at 20:14

## 2024-04-13 RX ADMIN — Medication 500 MG: at 09:35

## 2024-04-13 RX ADMIN — PREGABALIN 100 MG: 100 CAPSULE ORAL at 20:13

## 2024-04-13 RX ADMIN — APIXABAN 5 MG: 5 TABLET, FILM COATED ORAL at 20:13

## 2024-04-13 RX ADMIN — INSULIN DETEMIR 40 UNITS: 100 INJECTION, SOLUTION SUBCUTANEOUS at 20:12

## 2024-04-13 RX ADMIN — Medication 500 MG: at 20:13

## 2024-04-13 RX ADMIN — DICLOFENAC SODIUM 4 G: 10 GEL TOPICAL at 20:16

## 2024-04-13 RX ADMIN — TRIAMCINOLONE ACETONIDE 1 APPLICATION: 1 OINTMENT TOPICAL at 09:35

## 2024-04-13 RX ADMIN — INSULIN DETEMIR 40 UNITS: 100 INJECTION, SOLUTION SUBCUTANEOUS at 09:34

## 2024-04-13 RX ADMIN — BACLOFEN 10 MG: 10 TABLET ORAL at 01:54

## 2024-04-13 RX ADMIN — FERROUS SULFATE TAB 325 MG (65 MG ELEMENTAL FE) 325 MG: 325 (65 FE) TAB at 09:00

## 2024-04-13 RX ADMIN — EMPAGLIFLOZIN 10 MG: 10 TABLET, FILM COATED ORAL at 09:34

## 2024-04-13 RX ADMIN — OXYCODONE AND ACETAMINOPHEN 1 TABLET: 10; 325 TABLET ORAL at 01:54

## 2024-04-13 RX ADMIN — DICLOFENAC SODIUM 4 G: 10 GEL TOPICAL at 09:00

## 2024-04-13 RX ADMIN — OXYCODONE AND ACETAMINOPHEN 1 TABLET: 10; 325 TABLET ORAL at 16:58

## 2024-04-13 RX ADMIN — PREGABALIN 100 MG: 100 CAPSULE ORAL at 16:33

## 2024-04-13 RX ADMIN — OXYCODONE AND ACETAMINOPHEN 1 TABLET: 10; 325 TABLET ORAL at 09:35

## 2024-04-13 RX ADMIN — CYANOCOBALAMIN TAB 500 MCG 1000 MCG: 500 TAB at 09:34

## 2024-04-13 RX ADMIN — SERTRALINE HYDROCHLORIDE 50 MG: 50 TABLET ORAL at 20:13

## 2024-04-13 RX ADMIN — LISINOPRIL 2.5 MG: 2.5 TABLET ORAL at 09:35

## 2024-04-13 RX ADMIN — Medication 10 MG: at 20:13

## 2024-04-13 RX ADMIN — BACLOFEN 10 MG: 10 TABLET ORAL at 16:58

## 2024-04-13 RX ADMIN — ATORVASTATIN CALCIUM 10 MG: 10 TABLET, FILM COATED ORAL at 20:13

## 2024-04-13 RX ADMIN — PREGABALIN 100 MG: 100 CAPSULE ORAL at 09:34

## 2024-04-13 RX ADMIN — BACLOFEN 10 MG: 10 TABLET ORAL at 09:34

## 2024-04-13 RX ADMIN — APIXABAN 5 MG: 5 TABLET, FILM COATED ORAL at 09:34

## 2024-04-13 RX ADMIN — IBUPROFEN 400 MG: 400 TABLET ORAL at 13:14

## 2024-04-13 NOTE — PLAN OF CARE
Goal Outcome Evaluation:  Plan of Care Reviewed With: patient        Progress: improving  Outcome Evaluation: Patient is alert and oriented x4. Able to make needs known to staff. Given PRN ibuprofen at 2245 for pain. Pt stated medication effective. PRN Baclofen and Percocet given at 0154 for 10/10 left hip pain. Pt resting with eyes closed at pain reassessment. Call light and personal belongings within reach. Continue with current plan of care.

## 2024-04-13 NOTE — PLAN OF CARE
Goal Outcome Evaluation:  Plan of Care Reviewed With: patient        Progress: no change  Outcome Evaluation: Resident alert, oriented, able to make needs known to staff. remains bedbound. Bloodglucose stable for all 3 meals. Medicated with prn ibuprofen x1, medicated with prn percocet and baclofen x1, effective. incont of stool this shift. resident assists with turning for incont. care. bilateral hips making loud popping sounds with each turn. Resident pleasant and cooperative.

## 2024-04-14 LAB
GLUCOSE BLDC GLUCOMTR-MCNC: 116 MG/DL (ref 70–99)
GLUCOSE BLDC GLUCOMTR-MCNC: 121 MG/DL (ref 70–99)
GLUCOSE BLDC GLUCOMTR-MCNC: 143 MG/DL (ref 70–99)
GLUCOSE BLDC GLUCOMTR-MCNC: 149 MG/DL (ref 70–99)

## 2024-04-14 PROCEDURE — 63710000001 ONDANSETRON ODT 4 MG TABLET DISPERSIBLE: Performed by: INTERNAL MEDICINE

## 2024-04-14 PROCEDURE — 82948 REAGENT STRIP/BLOOD GLUCOSE: CPT | Performed by: INTERNAL MEDICINE

## 2024-04-14 PROCEDURE — 63710000001 INSULIN DETEMIR PER 5 UNITS: Performed by: PHYSICIAN ASSISTANT

## 2024-04-14 PROCEDURE — 82948 REAGENT STRIP/BLOOD GLUCOSE: CPT

## 2024-04-14 RX ADMIN — OXYCODONE AND ACETAMINOPHEN 1 TABLET: 10; 325 TABLET ORAL at 03:00

## 2024-04-14 RX ADMIN — PREGABALIN 100 MG: 100 CAPSULE ORAL at 17:39

## 2024-04-14 RX ADMIN — Medication 10 MG: at 21:07

## 2024-04-14 RX ADMIN — BACLOFEN 10 MG: 10 TABLET ORAL at 11:08

## 2024-04-14 RX ADMIN — TRIAMCINOLONE ACETONIDE 1 APPLICATION: 1 OINTMENT TOPICAL at 21:08

## 2024-04-14 RX ADMIN — BACLOFEN 10 MG: 10 TABLET ORAL at 03:00

## 2024-04-14 RX ADMIN — PREGABALIN 100 MG: 100 CAPSULE ORAL at 09:34

## 2024-04-14 RX ADMIN — LISINOPRIL 2.5 MG: 2.5 TABLET ORAL at 09:34

## 2024-04-14 RX ADMIN — Medication 500 MG: at 09:33

## 2024-04-14 RX ADMIN — EMPAGLIFLOZIN 10 MG: 10 TABLET, FILM COATED ORAL at 09:34

## 2024-04-14 RX ADMIN — OXYCODONE AND ACETAMINOPHEN 1 TABLET: 10; 325 TABLET ORAL at 21:07

## 2024-04-14 RX ADMIN — DICLOFENAC SODIUM 4 G: 10 GEL TOPICAL at 17:39

## 2024-04-14 RX ADMIN — ONDANSETRON 4 MG: 4 TABLET, ORALLY DISINTEGRATING ORAL at 17:39

## 2024-04-14 RX ADMIN — SERTRALINE HYDROCHLORIDE 50 MG: 50 TABLET ORAL at 21:07

## 2024-04-14 RX ADMIN — ATORVASTATIN CALCIUM 10 MG: 10 TABLET, FILM COATED ORAL at 21:07

## 2024-04-14 RX ADMIN — PREGABALIN 100 MG: 100 CAPSULE ORAL at 21:07

## 2024-04-14 RX ADMIN — OXYCODONE AND ACETAMINOPHEN 1 TABLET: 10; 325 TABLET ORAL at 11:05

## 2024-04-14 RX ADMIN — Medication 500 MG: at 21:07

## 2024-04-14 RX ADMIN — FERROUS SULFATE TAB 325 MG (65 MG ELEMENTAL FE) 325 MG: 325 (65 FE) TAB at 09:33

## 2024-04-14 RX ADMIN — APIXABAN 5 MG: 5 TABLET, FILM COATED ORAL at 21:07

## 2024-04-14 RX ADMIN — IBUPROFEN 400 MG: 400 TABLET ORAL at 04:17

## 2024-04-14 RX ADMIN — CYANOCOBALAMIN TAB 500 MCG 1000 MCG: 500 TAB at 09:33

## 2024-04-14 RX ADMIN — INSULIN DETEMIR 40 UNITS: 100 INJECTION, SOLUTION SUBCUTANEOUS at 21:06

## 2024-04-14 RX ADMIN — APIXABAN 5 MG: 5 TABLET, FILM COATED ORAL at 09:34

## 2024-04-14 RX ADMIN — BACLOFEN 10 MG: 10 TABLET ORAL at 21:07

## 2024-04-14 RX ADMIN — DICLOFENAC SODIUM 4 G: 10 GEL TOPICAL at 09:35

## 2024-04-14 RX ADMIN — TRIAMCINOLONE ACETONIDE 1 APPLICATION: 1 OINTMENT TOPICAL at 09:37

## 2024-04-14 RX ADMIN — DICLOFENAC SODIUM 4 G: 10 GEL TOPICAL at 21:07

## 2024-04-14 RX ADMIN — INSULIN DETEMIR 40 UNITS: 100 INJECTION, SOLUTION SUBCUTANEOUS at 09:33

## 2024-04-14 NOTE — PLAN OF CARE
Goal Outcome Evaluation:  Plan of Care Reviewed With: patient        Progress: no change  Outcome Evaluation: Alert and oriented. VItal signs stable. Continues to use trapeze for repositioning self in bed. Refuses every two hour turning and repositiong by staff. Percocet, Baclofen, and ibuprofen administered for pain management of left hip and shoulder. Continue plan of care.

## 2024-04-14 NOTE — PLAN OF CARE
Goal Outcome Evaluation:  Plan of Care Reviewed With: patient        Progress: no change  Outcome Evaluation: Alert and oriented, Vital signs stable. Patient complains of left hip and left shoulder pain throughout shift. PRN medications administered as ordered, patient reports relief for a short time. Patient reports nausea after eating sweets, PRN ondasteron administered. Patient refusing turns and assistance with repositioning throughout day. Skin and wound care performed as ordered.

## 2024-04-15 LAB
GLUCOSE BLDC GLUCOMTR-MCNC: 123 MG/DL (ref 70–99)
GLUCOSE BLDC GLUCOMTR-MCNC: 137 MG/DL (ref 70–99)
GLUCOSE BLDC GLUCOMTR-MCNC: 148 MG/DL (ref 70–99)
GLUCOSE BLDC GLUCOMTR-MCNC: 93 MG/DL (ref 70–99)

## 2024-04-15 PROCEDURE — 82948 REAGENT STRIP/BLOOD GLUCOSE: CPT

## 2024-04-15 PROCEDURE — 63710000001 INSULIN DETEMIR PER 5 UNITS: Performed by: PHYSICIAN ASSISTANT

## 2024-04-15 PROCEDURE — 82948 REAGENT STRIP/BLOOD GLUCOSE: CPT | Performed by: INTERNAL MEDICINE

## 2024-04-15 PROCEDURE — 99308 SBSQ NF CARE LOW MDM 20: CPT | Performed by: INTERNAL MEDICINE

## 2024-04-15 RX ADMIN — Medication 500 MG: at 08:39

## 2024-04-15 RX ADMIN — OXYCODONE AND ACETAMINOPHEN 1 TABLET: 10; 325 TABLET ORAL at 22:06

## 2024-04-15 RX ADMIN — OXYCODONE AND ACETAMINOPHEN 1 TABLET: 10; 325 TABLET ORAL at 05:53

## 2024-04-15 RX ADMIN — OXYCODONE AND ACETAMINOPHEN 1 TABLET: 10; 325 TABLET ORAL at 13:56

## 2024-04-15 RX ADMIN — DICLOFENAC SODIUM 4 G: 10 GEL TOPICAL at 08:40

## 2024-04-15 RX ADMIN — BACLOFEN 10 MG: 10 TABLET ORAL at 05:53

## 2024-04-15 RX ADMIN — SERTRALINE HYDROCHLORIDE 50 MG: 50 TABLET ORAL at 20:25

## 2024-04-15 RX ADMIN — BACLOFEN 10 MG: 10 TABLET ORAL at 22:07

## 2024-04-15 RX ADMIN — DICLOFENAC SODIUM 4 G: 10 GEL TOPICAL at 20:27

## 2024-04-15 RX ADMIN — PREGABALIN 100 MG: 100 CAPSULE ORAL at 15:37

## 2024-04-15 RX ADMIN — CYANOCOBALAMIN TAB 500 MCG 1000 MCG: 500 TAB at 08:39

## 2024-04-15 RX ADMIN — PREGABALIN 100 MG: 100 CAPSULE ORAL at 20:25

## 2024-04-15 RX ADMIN — DICLOFENAC SODIUM 4 G: 10 GEL TOPICAL at 18:10

## 2024-04-15 RX ADMIN — INSULIN DETEMIR 40 UNITS: 100 INJECTION, SOLUTION SUBCUTANEOUS at 08:38

## 2024-04-15 RX ADMIN — APIXABAN 5 MG: 5 TABLET, FILM COATED ORAL at 08:39

## 2024-04-15 RX ADMIN — LISINOPRIL 2.5 MG: 2.5 TABLET ORAL at 08:39

## 2024-04-15 RX ADMIN — Medication 10 MG: at 20:25

## 2024-04-15 RX ADMIN — FERROUS SULFATE TAB 325 MG (65 MG ELEMENTAL FE) 325 MG: 325 (65 FE) TAB at 08:39

## 2024-04-15 RX ADMIN — APIXABAN 5 MG: 5 TABLET, FILM COATED ORAL at 20:26

## 2024-04-15 RX ADMIN — BACLOFEN 10 MG: 10 TABLET ORAL at 13:56

## 2024-04-15 RX ADMIN — TRIAMCINOLONE ACETONIDE 1 APPLICATION: 1 OINTMENT TOPICAL at 20:27

## 2024-04-15 RX ADMIN — PREGABALIN 100 MG: 100 CAPSULE ORAL at 08:39

## 2024-04-15 RX ADMIN — TRIAMCINOLONE ACETONIDE 1 APPLICATION: 1 OINTMENT TOPICAL at 09:58

## 2024-04-15 RX ADMIN — DICLOFENAC SODIUM 4 G: 10 GEL TOPICAL at 11:24

## 2024-04-15 RX ADMIN — ATORVASTATIN CALCIUM 10 MG: 10 TABLET, FILM COATED ORAL at 20:26

## 2024-04-15 RX ADMIN — Medication 500 MG: at 20:26

## 2024-04-15 RX ADMIN — ACETAMINOPHEN 650 MG: 325 TABLET ORAL at 10:53

## 2024-04-15 RX ADMIN — IBUPROFEN 400 MG: 400 TABLET ORAL at 02:40

## 2024-04-15 RX ADMIN — INSULIN DETEMIR 40 UNITS: 100 INJECTION, SOLUTION SUBCUTANEOUS at 20:26

## 2024-04-15 RX ADMIN — EMPAGLIFLOZIN 10 MG: 10 TABLET, FILM COATED ORAL at 08:39

## 2024-04-15 NOTE — PLAN OF CARE
Goal Outcome Evaluation:  Plan of Care Reviewed With: patient        Progress: improving       Patient is  alert and oriented x 4 with intermittent episodes of lethargy during shift. Patient complains of left hip and left shoulder pain throughout shift. PRN percocet and baclofen administered as ordered.  Dressing changed per order. Patient able to make needs known to staff. Will continue with current care plan.

## 2024-04-15 NOTE — PROGRESS NOTES
[facility]   Hospitalist Progress Note  Date: 4/15/2024  Patient Name: Jose Shaikh  : 1960  MRN: [id number]  Date of admission: 2023      Subjective   Subjective     Chief Complaint: Weakness    Summary: Jose Shaikh is a 63 y.o. male  initially hospitalized on 9/10/2023, prolonged hospitalization for treatment of management of generalized weakness, deconditioning, with acute issues of diarrhea, C. difficile negative.  Diarrhea improved.  Had small hematoma after ambulating on his foot.  Unfortunately with exhaustive efforts to try to place this gentleman after 39 days of hospitalization, we are unable to find a facility that will accept him.  He has no further acute needs or requirements for inpatient monitoring and management, his labs and vitals are stable, he is tolerating oral intake, and has been refusing turns and repositionings and other interventions with nursing staff despite recommendations.  Patient discharged in hemodynamically stable condition on 10/19/2023 to follow-up with PCP within 1 week. Unfortunately we cannot solve all of his social issues during this hospitalization due to lack of resources and participation from the patient's perspective despite all our efforts.  Patient has a financial means of making arrangements at home.  Patient appealed his discharge.  Lost his appeal.  LCD: 10/20/23, PFL beginning: 10/21/23 @ 12pm.  Due to an inability to place the patient in a.m. nursing home he has been placed in our skilled nursing facility until arrangements can be made or until he is able to care for himself.      Interval Followup: 4/15/2024    Reports difficulty eating candy due to his Ozempic.      Review of systems: All systems reviewed and negative except what is noted above in interval follow-up   Objective   Objective     Vitals:   Temp:  [97.3 °F (36.3 °C)-97.5 °F (36.4 °C)] 97.5 °F (36.4 °C)  Heart Rate:  [78-81] 81  Resp:  [16-18] 16  BP: ()/(51-62)  96/51    Physical Exam   Constitutional: No distress.  Alert and conversational.  Respiratory: No wheeze noted.  GI: Abdomen: Obese  Neuro/psych:  Calm mood.  No dysarthria.  Extremities: Some lower extremity edema noted    Result Review    Result Review:  I have personally reviewed the results from the time of this admission to 4/15/2024 16:13 EDT and agree with these findings:  [x]  Laboratory  CBC          3/11/2024    05:14 4/2/2024    04:50 4/8/2024    05:06   CBC   WBC 3.62  4.00  4.20    RBC 4.21  4.16  4.08    Hemoglobin 11.7  11.7  11.5    Hematocrit 37.1  36.5  36.7    MCV 88.1  87.7  90.0    MCH 27.8  28.1  28.2    MCHC 31.5  32.1  31.3    RDW 15.7  15.5  15.4    Platelets 102  82  87      CMP          3/11/2024    05:14 4/2/2024    04:50 4/8/2024    05:06   CMP   Glucose 142  114  127    BUN 16  13  16    Creatinine 0.56  0.48  0.53    EGFR 109.4  114.6  111.2    Sodium 137  136  138    Potassium 4.3  4.0  4.0    Chloride 105  106  105    Calcium 8.2  8.4  8.3    Total Protein   5.5    Albumin   2.8    Globulin   2.7    Total Bilirubin   0.5    Alkaline Phosphatase   246    AST (SGOT)   50    ALT (SGPT)   36    Albumin/Globulin Ratio   1.0    BUN/Creatinine Ratio 28.6  27.1  30.2    Anion Gap 7.9  7.2  6.8        []  Microbiology  []  Radiology  []  EKG/Telemetry   []  Cardiology/Vascular   []  Pathology  []  Old records  []  Other:    Assessment & Plan   Assessment / Plan   Assessment:  Hospital-acquired weakness  Hx of Influenza A  Hx of C. difficile diarrhea treated  Generalized weakness  Deconditioning  DM (Kami Garcia and PCP)  HTN  PAF (on Eliquis; previously seen Dr. 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    CODE STATUS:   Code Status (Patient has no pulse and is not breathing): CPR (Attempt to Resuscitate)  Medical Interventions (Patient has pulse or is breathing): Full Support    Electronically signed by Jose Maya MD, 04/15/24, 4:14 PM EDT.

## 2024-04-16 LAB
GLUCOSE BLDC GLUCOMTR-MCNC: 112 MG/DL (ref 70–99)
GLUCOSE BLDC GLUCOMTR-MCNC: 126 MG/DL (ref 70–99)
GLUCOSE BLDC GLUCOMTR-MCNC: 150 MG/DL (ref 70–99)
GLUCOSE BLDC GLUCOMTR-MCNC: 160 MG/DL (ref 70–99)

## 2024-04-16 PROCEDURE — 82948 REAGENT STRIP/BLOOD GLUCOSE: CPT

## 2024-04-16 PROCEDURE — 82948 REAGENT STRIP/BLOOD GLUCOSE: CPT | Performed by: INTERNAL MEDICINE

## 2024-04-16 PROCEDURE — 63710000001 INSULIN LISPRO (HUMAN) PER 5 UNITS: Performed by: INTERNAL MEDICINE

## 2024-04-16 PROCEDURE — 63710000001 INSULIN DETEMIR PER 5 UNITS: Performed by: PHYSICIAN ASSISTANT

## 2024-04-16 RX ADMIN — BACLOFEN 10 MG: 10 TABLET ORAL at 06:24

## 2024-04-16 RX ADMIN — INSULIN LISPRO 4 UNITS: 100 INJECTION, SOLUTION INTRAVENOUS; SUBCUTANEOUS at 17:10

## 2024-04-16 RX ADMIN — PREGABALIN 100 MG: 100 CAPSULE ORAL at 15:43

## 2024-04-16 RX ADMIN — BACLOFEN 10 MG: 10 TABLET ORAL at 22:29

## 2024-04-16 RX ADMIN — CYANOCOBALAMIN TAB 500 MCG 1000 MCG: 500 TAB at 08:29

## 2024-04-16 RX ADMIN — DICLOFENAC SODIUM 4 G: 10 GEL TOPICAL at 20:57

## 2024-04-16 RX ADMIN — OXYCODONE AND ACETAMINOPHEN 1 TABLET: 10; 325 TABLET ORAL at 06:24

## 2024-04-16 RX ADMIN — IBUPROFEN 400 MG: 400 TABLET ORAL at 15:54

## 2024-04-16 RX ADMIN — INSULIN DETEMIR 40 UNITS: 100 INJECTION, SOLUTION SUBCUTANEOUS at 08:29

## 2024-04-16 RX ADMIN — DICLOFENAC SODIUM 4 G: 10 GEL TOPICAL at 17:34

## 2024-04-16 RX ADMIN — PREGABALIN 100 MG: 100 CAPSULE ORAL at 20:57

## 2024-04-16 RX ADMIN — APIXABAN 5 MG: 5 TABLET, FILM COATED ORAL at 08:30

## 2024-04-16 RX ADMIN — INSULIN LISPRO 4 UNITS: 100 INJECTION, SOLUTION INTRAVENOUS; SUBCUTANEOUS at 11:18

## 2024-04-16 RX ADMIN — APIXABAN 5 MG: 5 TABLET, FILM COATED ORAL at 20:57

## 2024-04-16 RX ADMIN — Medication 10 MG: at 20:57

## 2024-04-16 RX ADMIN — LISINOPRIL 2.5 MG: 2.5 TABLET ORAL at 08:29

## 2024-04-16 RX ADMIN — PREGABALIN 100 MG: 100 CAPSULE ORAL at 08:30

## 2024-04-16 RX ADMIN — BISMUTH SUBSALICYLATE 524 MG: 262 TABLET, CHEWABLE ORAL at 00:48

## 2024-04-16 RX ADMIN — OXYCODONE AND ACETAMINOPHEN 1 TABLET: 10; 325 TABLET ORAL at 14:20

## 2024-04-16 RX ADMIN — DICLOFENAC SODIUM 4 G: 10 GEL TOPICAL at 08:29

## 2024-04-16 RX ADMIN — OXYCODONE AND ACETAMINOPHEN 1 TABLET: 10; 325 TABLET ORAL at 22:27

## 2024-04-16 RX ADMIN — TRIAMCINOLONE ACETONIDE 1 APPLICATION: 1 OINTMENT TOPICAL at 20:57

## 2024-04-16 RX ADMIN — INSULIN DETEMIR 40 UNITS: 100 INJECTION, SOLUTION SUBCUTANEOUS at 20:57

## 2024-04-16 RX ADMIN — BISMUTH SUBSALICYLATE 524 MG: 262 TABLET, CHEWABLE ORAL at 13:22

## 2024-04-16 RX ADMIN — Medication 500 MG: at 08:29

## 2024-04-16 RX ADMIN — SERTRALINE HYDROCHLORIDE 50 MG: 50 TABLET ORAL at 20:57

## 2024-04-16 RX ADMIN — EMPAGLIFLOZIN 10 MG: 10 TABLET, FILM COATED ORAL at 08:30

## 2024-04-16 RX ADMIN — FERROUS SULFATE TAB 325 MG (65 MG ELEMENTAL FE) 325 MG: 325 (65 FE) TAB at 08:30

## 2024-04-16 RX ADMIN — BACLOFEN 10 MG: 10 TABLET ORAL at 14:20

## 2024-04-16 RX ADMIN — Medication 500 MG: at 20:57

## 2024-04-16 RX ADMIN — TRIAMCINOLONE ACETONIDE 1 APPLICATION: 1 OINTMENT TOPICAL at 08:29

## 2024-04-16 RX ADMIN — ATORVASTATIN CALCIUM 10 MG: 10 TABLET, FILM COATED ORAL at 20:57

## 2024-04-16 NOTE — PLAN OF CARE
Goal Outcome Evaluation:  Plan of Care Reviewed With: patient        Progress: no change         Patient is  alert and oriented x 4 with intermittent episodes of lethargy during shift. Patient complains of left hip and left shoulder pain throughout shift. PRN percocet and baclofen administered as ordered.  Dressing changed per order. Patient able to make needs known to staff. Will continue with current care plan.

## 2024-04-16 NOTE — PLAN OF CARE
Goal Outcome Evaluation:  Plan of Care Reviewed With: patient        Progress: improving  Outcome Evaluation: Pt is AO x 4 and VSS. Percocet and baclofen given twice this shift for pain in left hip and left shoulder. He uses the bedpan at night and can be incontinent of stool. Will continue with his plan of care. Call light and personal items within reach.

## 2024-04-17 LAB
GLUCOSE BLDC GLUCOMTR-MCNC: 149 MG/DL (ref 70–99)
GLUCOSE BLDC GLUCOMTR-MCNC: 164 MG/DL (ref 70–99)
GLUCOSE BLDC GLUCOMTR-MCNC: 184 MG/DL (ref 70–99)
GLUCOSE BLDC GLUCOMTR-MCNC: 95 MG/DL (ref 70–99)

## 2024-04-17 PROCEDURE — 82948 REAGENT STRIP/BLOOD GLUCOSE: CPT

## 2024-04-17 PROCEDURE — 63710000001 INSULIN DETEMIR PER 5 UNITS: Performed by: PHYSICIAN ASSISTANT

## 2024-04-17 PROCEDURE — 63710000001 INSULIN LISPRO (HUMAN) PER 5 UNITS: Performed by: INTERNAL MEDICINE

## 2024-04-17 PROCEDURE — 82948 REAGENT STRIP/BLOOD GLUCOSE: CPT | Performed by: INTERNAL MEDICINE

## 2024-04-17 PROCEDURE — 99309 SBSQ NF CARE MODERATE MDM 30: CPT | Performed by: PHYSICIAN ASSISTANT

## 2024-04-17 RX ORDER — FUROSEMIDE 40 MG
40 TABLET ORAL DAILY
Status: DISCONTINUED | OUTPATIENT
Start: 2024-04-17 | End: 2024-05-22

## 2024-04-17 RX ADMIN — IBUPROFEN 400 MG: 400 TABLET ORAL at 07:38

## 2024-04-17 RX ADMIN — APIXABAN 5 MG: 5 TABLET, FILM COATED ORAL at 08:23

## 2024-04-17 RX ADMIN — CYANOCOBALAMIN TAB 500 MCG 1000 MCG: 500 TAB at 08:24

## 2024-04-17 RX ADMIN — OXYCODONE AND ACETAMINOPHEN 1 TABLET: 10; 325 TABLET ORAL at 06:26

## 2024-04-17 RX ADMIN — DICLOFENAC SODIUM 4 G: 10 GEL TOPICAL at 17:29

## 2024-04-17 RX ADMIN — OXYCODONE AND ACETAMINOPHEN 1 TABLET: 10; 325 TABLET ORAL at 14:26

## 2024-04-17 RX ADMIN — BACLOFEN 10 MG: 10 TABLET ORAL at 22:39

## 2024-04-17 RX ADMIN — INSULIN DETEMIR 40 UNITS: 100 INJECTION, SOLUTION SUBCUTANEOUS at 20:51

## 2024-04-17 RX ADMIN — ATORVASTATIN CALCIUM 10 MG: 10 TABLET, FILM COATED ORAL at 20:50

## 2024-04-17 RX ADMIN — OXYCODONE AND ACETAMINOPHEN 1 TABLET: 10; 325 TABLET ORAL at 22:39

## 2024-04-17 RX ADMIN — INSULIN DETEMIR 40 UNITS: 100 INJECTION, SOLUTION SUBCUTANEOUS at 08:23

## 2024-04-17 RX ADMIN — Medication 500 MG: at 20:50

## 2024-04-17 RX ADMIN — DICLOFENAC SODIUM 4 G: 10 GEL TOPICAL at 07:39

## 2024-04-17 RX ADMIN — Medication 10 MG: at 20:50

## 2024-04-17 RX ADMIN — Medication 500 MG: at 08:24

## 2024-04-17 RX ADMIN — PREGABALIN 100 MG: 100 CAPSULE ORAL at 08:24

## 2024-04-17 RX ADMIN — BACLOFEN 10 MG: 10 TABLET ORAL at 06:26

## 2024-04-17 RX ADMIN — DICLOFENAC SODIUM 4 G: 10 GEL TOPICAL at 20:52

## 2024-04-17 RX ADMIN — SERTRALINE HYDROCHLORIDE 50 MG: 50 TABLET ORAL at 20:50

## 2024-04-17 RX ADMIN — EMPAGLIFLOZIN 10 MG: 10 TABLET, FILM COATED ORAL at 08:24

## 2024-04-17 RX ADMIN — INSULIN LISPRO 4 UNITS: 100 INJECTION, SOLUTION INTRAVENOUS; SUBCUTANEOUS at 20:51

## 2024-04-17 RX ADMIN — BACLOFEN 10 MG: 10 TABLET ORAL at 14:26

## 2024-04-17 RX ADMIN — TRIAMCINOLONE ACETONIDE 1 APPLICATION: 1 OINTMENT TOPICAL at 20:54

## 2024-04-17 RX ADMIN — LISINOPRIL 2.5 MG: 2.5 TABLET ORAL at 08:23

## 2024-04-17 RX ADMIN — TRIAMCINOLONE ACETONIDE 1 APPLICATION: 1 OINTMENT TOPICAL at 08:23

## 2024-04-17 RX ADMIN — PREGABALIN 100 MG: 100 CAPSULE ORAL at 20:51

## 2024-04-17 RX ADMIN — APIXABAN 5 MG: 5 TABLET, FILM COATED ORAL at 20:51

## 2024-04-17 RX ADMIN — INSULIN LISPRO 4 UNITS: 100 INJECTION, SOLUTION INTRAVENOUS; SUBCUTANEOUS at 12:03

## 2024-04-17 RX ADMIN — FUROSEMIDE 40 MG: 40 TABLET ORAL at 17:28

## 2024-04-17 RX ADMIN — PREGABALIN 100 MG: 100 CAPSULE ORAL at 16:49

## 2024-04-17 RX ADMIN — FERROUS SULFATE TAB 325 MG (65 MG ELEMENTAL FE) 325 MG: 325 (65 FE) TAB at 08:24

## 2024-04-17 NOTE — PLAN OF CARE
Goal Outcome Evaluation:  Plan of Care Reviewed With: patient        Progress: no change  Outcome Evaluation: resident alert, oriented, able to make needs known to staff. remains bedbound. did stand for weekly wt today briefly. Resident noted to have 1.0 lb wt.loss. PA in for f/u visit today. new orders received for increase in semiglutide (ozempic). new orders for Lasix d/t increase of edema to flanks, lower abdomen and BLLE. blood glucose elevated at lunch, covered with sliding scale, stable for breakfast and dinner. fecal incontinence x1 today. used bedpan most of shift. Resident pleasant and cooperative.

## 2024-04-17 NOTE — PROGRESS NOTES
[facility]   Hospitalist Progress Note  Date: 2024  Patient Name: Jose Shaikh  : 1960  MRN: [id number]  Date of admission: 2023      Subjective   Subjective     Chief Complaint: Weakness    Summary: Jose Shaikh is a 63 y.o. male  initially hospitalized on 9/10/2023, prolonged hospitalization for treatment of management of generalized weakness, deconditioning, with acute issues of diarrhea, C. difficile negative.  Diarrhea improved.  Had small hematoma after ambulating on his foot.  Unfortunately with exhaustive efforts to try to place this gentleman after 39 days of hospitalization, we are unable to find a facility that will accept him.  He has no further acute needs or requirements for inpatient monitoring and management, his labs and vitals are stable, he is tolerating oral intake, and has been refusing turns and repositionings and other interventions with nursing staff despite recommendations.  Patient discharged in hemodynamically stable condition on 10/19/2023 to follow-up with PCP within 1 week. Unfortunately we cannot solve all of his social issues during this hospitalization due to lack of resources and participation from the patient's perspective despite all our efforts.  Patient has a financial means of making arrangements at home.  Patient appealed his discharge.  Lost his appeal.  LCD: 10/20/23, PFL beginning: 10/21/23 @ 12pm.  Due to an inability to place the patient in a.m. nursing home he has been placed in our skilled nursing facility until arrangements can be made or until he is able to care for himself.      Interval Followup: 2024    Tolerating Ozempic.  Will increase to 1 mg/weekly starting today.  Venous stasis/edema and also abdominal wall edema.  Start Lasix.  Monitor response.    Review of systems: All systems reviewed and negative except what is noted above in interval follow-up   Objective   Objective     Vitals:   Temp:  [98.1 °F (36.7 °C)-98.6 °F (37 °C)]  98.6 °F (37 °C)  Heart Rate:  [69-81] 81  Resp:  [18] 18  BP: ()/(55-62) 112/55    Physical Exam   Constitutional: No distress.  Alert and conversational.  Respiratory: No wheeze noted.  GI: Abdomen: Obese  Neuro/psych:  Calm mood.  No dysarthria.  Extremities: Some lower extremity edema noted    Result Review    Result Review:  I have personally reviewed the results from the time of this admission to 4/17/2024 16:51 EDT and agree with these findings:  [x]  Laboratory  [x]  Microbiology  []  Radiology  []  EKG/Telemetry   []  Cardiology/Vascular   []  Pathology  []  Old records  []  Other:    Assessment & Plan   Assessment / Plan   Assessment:  Hospital-acquired weakness  Hx of Influenza A  Hx of C. difficile diarrhea treated  Generalized weakness  Deconditioning  DM (Kami Garcia and PCP)  HTN  PAF (on Eliquis; previously seen Dr. 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    CODE STATUS:   Code Status (Patient has no pulse and is not breathing): CPR (Attempt to Resuscitate)  Medical Interventions (Patient has pulse or is breathing): Full Support

## 2024-04-17 NOTE — PLAN OF CARE
Goal Outcome Evaluation:  Plan of Care Reviewed With: patient      Pt alert and oriented x4. Bed bound. Able to weight shift per self. Pain medication given per request for hip pain. Non verbal indicatiors showed improvement. PT was able to sleep. Call light in reach and able to make his needs known

## 2024-04-18 LAB
ANION GAP SERPL CALCULATED.3IONS-SCNC: 8.8 MMOL/L (ref 5–15)
BUN SERPL-MCNC: 12 MG/DL (ref 8–23)
BUN/CREAT SERPL: 27.3 (ref 7–25)
CALCIUM SPEC-SCNC: 8 MG/DL (ref 8.6–10.5)
CHLORIDE SERPL-SCNC: 106 MMOL/L (ref 98–107)
CO2 SERPL-SCNC: 24.2 MMOL/L (ref 22–29)
CREAT SERPL-MCNC: 0.44 MG/DL (ref 0.76–1.27)
EGFRCR SERPLBLD CKD-EPI 2021: 117.6 ML/MIN/1.73
GLUCOSE BLDC GLUCOMTR-MCNC: 109 MG/DL (ref 70–99)
GLUCOSE BLDC GLUCOMTR-MCNC: 118 MG/DL (ref 70–99)
GLUCOSE BLDC GLUCOMTR-MCNC: 120 MG/DL (ref 70–99)
GLUCOSE BLDC GLUCOMTR-MCNC: 128 MG/DL (ref 70–99)
GLUCOSE SERPL-MCNC: 175 MG/DL (ref 65–99)
MAGNESIUM SERPL-MCNC: 1.7 MG/DL (ref 1.6–2.4)
POTASSIUM SERPL-SCNC: 3.8 MMOL/L (ref 3.5–5.2)
SODIUM SERPL-SCNC: 139 MMOL/L (ref 136–145)

## 2024-04-18 PROCEDURE — 63710000001 INSULIN DETEMIR PER 5 UNITS: Performed by: PHYSICIAN ASSISTANT

## 2024-04-18 PROCEDURE — 82948 REAGENT STRIP/BLOOD GLUCOSE: CPT | Performed by: INTERNAL MEDICINE

## 2024-04-18 PROCEDURE — 80048 BASIC METABOLIC PNL TOTAL CA: CPT | Performed by: PHYSICIAN ASSISTANT

## 2024-04-18 PROCEDURE — 83735 ASSAY OF MAGNESIUM: CPT | Performed by: PHYSICIAN ASSISTANT

## 2024-04-18 PROCEDURE — 82948 REAGENT STRIP/BLOOD GLUCOSE: CPT

## 2024-04-18 RX ADMIN — BACLOFEN 10 MG: 10 TABLET ORAL at 15:36

## 2024-04-18 RX ADMIN — PREGABALIN 100 MG: 100 CAPSULE ORAL at 09:58

## 2024-04-18 RX ADMIN — Medication: at 20:03

## 2024-04-18 RX ADMIN — CYANOCOBALAMIN TAB 500 MCG 1000 MCG: 500 TAB at 09:58

## 2024-04-18 RX ADMIN — DICLOFENAC SODIUM 4 G: 10 GEL TOPICAL at 07:35

## 2024-04-18 RX ADMIN — PREGABALIN 100 MG: 100 CAPSULE ORAL at 16:42

## 2024-04-18 RX ADMIN — INSULIN DETEMIR 40 UNITS: 100 INJECTION, SOLUTION SUBCUTANEOUS at 20:17

## 2024-04-18 RX ADMIN — FERROUS SULFATE TAB 325 MG (65 MG ELEMENTAL FE) 325 MG: 325 (65 FE) TAB at 09:58

## 2024-04-18 RX ADMIN — IBUPROFEN 400 MG: 400 TABLET ORAL at 02:55

## 2024-04-18 RX ADMIN — TRIAMCINOLONE ACETONIDE 1 APPLICATION: 1 OINTMENT TOPICAL at 10:03

## 2024-04-18 RX ADMIN — SERTRALINE HYDROCHLORIDE 50 MG: 50 TABLET ORAL at 20:20

## 2024-04-18 RX ADMIN — Medication 10 MG: at 20:19

## 2024-04-18 RX ADMIN — ATORVASTATIN CALCIUM 10 MG: 10 TABLET, FILM COATED ORAL at 20:18

## 2024-04-18 RX ADMIN — OXYCODONE AND ACETAMINOPHEN 1 TABLET: 10; 325 TABLET ORAL at 07:34

## 2024-04-18 RX ADMIN — INSULIN DETEMIR 40 UNITS: 100 INJECTION, SOLUTION SUBCUTANEOUS at 09:57

## 2024-04-18 RX ADMIN — Medication 500 MG: at 20:19

## 2024-04-18 RX ADMIN — FUROSEMIDE 40 MG: 40 TABLET ORAL at 09:58

## 2024-04-18 RX ADMIN — PREGABALIN 100 MG: 100 CAPSULE ORAL at 20:19

## 2024-04-18 RX ADMIN — BACLOFEN 10 MG: 10 TABLET ORAL at 07:34

## 2024-04-18 RX ADMIN — Medication 500 MG: at 09:57

## 2024-04-18 RX ADMIN — DICLOFENAC SODIUM 4 G: 10 GEL TOPICAL at 17:26

## 2024-04-18 RX ADMIN — OXYCODONE AND ACETAMINOPHEN 1 TABLET: 10; 325 TABLET ORAL at 15:36

## 2024-04-18 RX ADMIN — DICLOFENAC SODIUM 4 G: 10 GEL TOPICAL at 11:25

## 2024-04-18 RX ADMIN — LISINOPRIL 2.5 MG: 2.5 TABLET ORAL at 09:57

## 2024-04-18 RX ADMIN — Medication: at 20:04

## 2024-04-18 RX ADMIN — APIXABAN 5 MG: 5 TABLET, FILM COATED ORAL at 20:19

## 2024-04-18 RX ADMIN — DICLOFENAC SODIUM 4 G: 10 GEL TOPICAL at 20:05

## 2024-04-18 RX ADMIN — APIXABAN 5 MG: 5 TABLET, FILM COATED ORAL at 09:58

## 2024-04-18 RX ADMIN — EMPAGLIFLOZIN 10 MG: 10 TABLET, FILM COATED ORAL at 09:58

## 2024-04-18 RX ADMIN — TRIAMCINOLONE ACETONIDE 1 APPLICATION: 1 OINTMENT TOPICAL at 20:08

## 2024-04-18 NOTE — PLAN OF CARE
Goal Outcome Evaluation:  Plan of Care Reviewed With: patient        Progress: improving  Outcome Evaluation: Pt is alert and oriented and VSS. No significant changes this shift. He uses the bedpan and urinal and can be incontinent of stool. He has been medicated for pain x 2 and had some relief. Blood sugar check was 184 and he had coverage. Will continue with his plan of care. Call light and personal items within reach.

## 2024-04-18 NOTE — PLAN OF CARE
Goal Outcome Evaluation:  Plan of Care Reviewed With: patient        Progress: no change  Outcome Evaluation: Patient alert and oriented. Given PRN Baclofen and Percocet at 0734 and again at 1536 for left hip pain. Med effective each occurance. Voices needs. Con't current POC.

## 2024-04-19 LAB
GLUCOSE BLDC GLUCOMTR-MCNC: 131 MG/DL (ref 70–99)
GLUCOSE BLDC GLUCOMTR-MCNC: 138 MG/DL (ref 70–99)
GLUCOSE BLDC GLUCOMTR-MCNC: 142 MG/DL (ref 70–99)
GLUCOSE BLDC GLUCOMTR-MCNC: 156 MG/DL (ref 70–99)

## 2024-04-19 PROCEDURE — 63710000001 INSULIN LISPRO (HUMAN) PER 5 UNITS: Performed by: INTERNAL MEDICINE

## 2024-04-19 PROCEDURE — 82948 REAGENT STRIP/BLOOD GLUCOSE: CPT | Performed by: INTERNAL MEDICINE

## 2024-04-19 PROCEDURE — 82948 REAGENT STRIP/BLOOD GLUCOSE: CPT

## 2024-04-19 PROCEDURE — 63710000001 INSULIN DETEMIR PER 5 UNITS: Performed by: PHYSICIAN ASSISTANT

## 2024-04-19 RX ORDER — ATORVASTATIN CALCIUM 10 MG/1
TABLET, FILM COATED ORAL
Status: COMPLETED
Start: 2024-04-19 | End: 2024-04-19

## 2024-04-19 RX ADMIN — ATORVASTATIN CALCIUM 10 MG: 10 TABLET, FILM COATED ORAL at 21:31

## 2024-04-19 RX ADMIN — TRIAMCINOLONE ACETONIDE 1 APPLICATION: 1 OINTMENT TOPICAL at 21:21

## 2024-04-19 RX ADMIN — Medication 10 MG: at 21:17

## 2024-04-19 RX ADMIN — BACLOFEN 10 MG: 10 TABLET ORAL at 12:50

## 2024-04-19 RX ADMIN — INSULIN LISPRO 4 UNITS: 100 INJECTION, SOLUTION INTRAVENOUS; SUBCUTANEOUS at 08:15

## 2024-04-19 RX ADMIN — Medication 500 MG: at 09:14

## 2024-04-19 RX ADMIN — TRIAMCINOLONE ACETONIDE 1 APPLICATION: 1 OINTMENT TOPICAL at 09:18

## 2024-04-19 RX ADMIN — BACLOFEN 10 MG: 10 TABLET ORAL at 00:25

## 2024-04-19 RX ADMIN — OXYCODONE AND ACETAMINOPHEN 1 TABLET: 10; 325 TABLET ORAL at 00:25

## 2024-04-19 RX ADMIN — PREGABALIN 100 MG: 100 CAPSULE ORAL at 16:37

## 2024-04-19 RX ADMIN — DICLOFENAC SODIUM 4 G: 10 GEL TOPICAL at 18:20

## 2024-04-19 RX ADMIN — OXYCODONE AND ACETAMINOPHEN 1 TABLET: 10; 325 TABLET ORAL at 10:08

## 2024-04-19 RX ADMIN — DICLOFENAC SODIUM 4 G: 10 GEL TOPICAL at 12:50

## 2024-04-19 RX ADMIN — EMPAGLIFLOZIN 10 MG: 10 TABLET, FILM COATED ORAL at 09:14

## 2024-04-19 RX ADMIN — Medication: at 21:12

## 2024-04-19 RX ADMIN — SERTRALINE HYDROCHLORIDE 50 MG: 50 TABLET ORAL at 21:16

## 2024-04-19 RX ADMIN — CYANOCOBALAMIN TAB 500 MCG 1000 MCG: 500 TAB at 09:14

## 2024-04-19 RX ADMIN — INSULIN DETEMIR 40 UNITS: 100 INJECTION, SOLUTION SUBCUTANEOUS at 09:14

## 2024-04-19 RX ADMIN — APIXABAN 5 MG: 5 TABLET, FILM COATED ORAL at 09:14

## 2024-04-19 RX ADMIN — Medication 500 MG: at 21:16

## 2024-04-19 RX ADMIN — DICLOFENAC SODIUM 4 G: 10 GEL TOPICAL at 08:17

## 2024-04-19 RX ADMIN — DICLOFENAC SODIUM 4 G: 10 GEL TOPICAL at 21:18

## 2024-04-19 RX ADMIN — LISINOPRIL 2.5 MG: 2.5 TABLET ORAL at 09:14

## 2024-04-19 RX ADMIN — IBUPROFEN 400 MG: 400 TABLET ORAL at 03:54

## 2024-04-19 RX ADMIN — PREGABALIN 100 MG: 100 CAPSULE ORAL at 09:14

## 2024-04-19 RX ADMIN — Medication: at 21:13

## 2024-04-19 RX ADMIN — OXYCODONE AND ACETAMINOPHEN 1 TABLET: 10; 325 TABLET ORAL at 22:01

## 2024-04-19 RX ADMIN — FUROSEMIDE 40 MG: 40 TABLET ORAL at 09:14

## 2024-04-19 RX ADMIN — BACLOFEN 10 MG: 10 TABLET ORAL at 22:01

## 2024-04-19 RX ADMIN — INSULIN DETEMIR 40 UNITS: 100 INJECTION, SOLUTION SUBCUTANEOUS at 21:15

## 2024-04-19 RX ADMIN — FERROUS SULFATE TAB 325 MG (65 MG ELEMENTAL FE) 325 MG: 325 (65 FE) TAB at 08:15

## 2024-04-19 RX ADMIN — APIXABAN 5 MG: 5 TABLET, FILM COATED ORAL at 21:17

## 2024-04-19 RX ADMIN — PREGABALIN 100 MG: 100 CAPSULE ORAL at 21:17

## 2024-04-19 NOTE — PLAN OF CARE
Goal Outcome Evaluation:POC reviewed with Rsd. Pt continues to loose small amts of weight on Ozempic then, regains more back, current BMI is 45.64. He is AOX4, VSS, makes needs known to staff. He continues to refuse turns but shifts weight using trapeze bar, pt has been compliant and pleasant this shift. Call light and personal belongings at bedside within reach. Percocet and Baclofen given at 0025, Ibuprophen at  0354. Pt acknowledged decrease in pain to tolerable level.

## 2024-04-19 NOTE — PLAN OF CARE
Goal Outcome Evaluation:   Alert and oriented and pleasant with staff. X2max assist for transfers and ambulation. X2 admin PRN pain medication, with relief of symptoms noted. Seen by WND nurse this shift. Sitting up in bed, call light in reach.

## 2024-04-19 NOTE — SIGNIFICANT NOTE
Wound Eval / Progress Noted    KEO Dominguez     Patient Name: Jose Shaikh  : 1960  MRN: [id number]  Today's Date: 2024                 Admit Date: 2023    Visit Dx:    ICD-10-CM ICD-9-CM   1. Difficulty in walking  R26.2 719.7   2. Type 2 diabetes mellitus with other specified complication, unspecified whether long term insulin use  E11.69 250.80         Debility    Ulcer of right foot    Ulcerated, foot, left, limited to breakdown of skin    Onychomycosis        Past Medical History:   Diagnosis Date    Absence of toe of right foot     Acute osteomyelitis of left calcaneus  2021    Anxiety and depression     Arthritis     Cancer     Chronic pain     STATES HIS PAIN IS 10/10 AAT    Claustrophobia     Corns and callus     Diabetic ulcer of left heel associated with type 2 DM 2021    Diabetic ulcer of left heel associated with type 2 DM 2021    Diabetic ulcer of right midfoot associated with type 2 DM 2021    Difficulty walking     Essential hypertension 2021    Hammertoe     Hyperlipidemia LDL goal <100 2021    Ingrown toenail     Obesity     Paroxysmal atrial fibrillation 2021    Polyneuropathy     Pressure ulcer, stage 1     Tinea unguium     Type 2 diabetes mellitus with polyneuropathy         Past Surgical History:   Procedure Laterality Date    CYST REMOVAL      center of back; benign    EYE SURGERY      INCISION AND DRAINAGE ABSCESS      back    INCISION AND DRAINAGE LEG Right 12/10/2021    Procedure: INCISION AND DRAINAGE LOWER EXTREMITY;  Surgeon: Ash Leyva DPM;  Location: [facility] MAIN OR;  Service: Podiatry;  Laterality: Right;    OTHER SURGICAL HISTORY      Surgical clips left foot    TOE SURGERY Right     Removal of 5th toe    TRANS METATARSAL AMPUTATION Right 2021    Procedure: AMPUTATION TRANS METATARSAL;  Surgeon: Ash Leyva DPM;  Location: [facility] MAIN OR;  Service: Podiatry;  Laterality: Right;    VASCULAR  SURGERY      WRIST SURGERY Left     repair of injury         Physical Assessment:     04/19/24 1310   Wound 03/27/24 1045 Right anterior Skin Tear   Placement Date/Time: 03/27/24 1045   Side: Right  Orientation: anterior  Primary Wound Type: Skin Tear   Wound Image    Dressing Appearance intact;no drainage   Closure None   Base scab;red;dry   Periwound intact;pink   Periwound Temperature warm   Periwound Skin Turgor soft   Edges rolled/closed;open   Wound Length (cm) 1.4 cm   Wound Width (cm) 0.5 cm   Wound Depth (cm) 0.1 cm   Wound Surface Area (cm^2) 0.7 cm^2   Wound Volume (cm^3) 0.07 cm^3   Drainage Characteristics/Odor serosanguineous   Drainage Amount scant   Care, Wound cleansed with;sterile normal saline   Dressing Care dressing applied;border dressing;non-adherent;petroleum-based;silicone   Periwound Care absorptive dressing applied   Wound 03/28/24 1225 Right medial ankle Traumatic   Placement Date/Time: 03/28/24 1225   Side: Right  Orientation: medial  Location: ankle  Primary Wound Type: Traumatic   Wound Image    Dressing Appearance intact;no drainage   Closure None   Base scab;red   Periwound intact;pink   Periwound Temperature warm   Periwound Skin Turgor soft   Edges open   Wound Length (cm) 0.2 cm   Wound Width (cm) 0.6 cm   Wound Depth (cm) 0.1 cm   Wound Surface Area (cm^2) 0.12 cm^2   Wound Volume (cm^3) 0.012 cm^3   Drainage Amount none   Care, Wound cleansed with;sterile normal saline   Dressing Care dressing applied;non-adherent;petroleum-based  (bandaid)   Periwound Care absorptive dressing applied   Wound 04/03/24 1730 Right anterior hip MASD (Moisture associated skin damage)   Placement Date/Time: 04/03/24 1730   Side: Right  Orientation: anterior  Location: hip  Primary Wound Type: MASD (Moisture associated skin damage)   Dressing Appearance open to air   Closure None   Base moist;pink   Periwound moist   Periwound Temperature warm   Periwound Skin Turgor soft   Edges rolled/closed    Drainage Amount none   Dressing Care open to air   Rash 04/06/24 1659 Left calf plaque   Placement Date/Time: 04/06/24 1659   Side: Left  Location: calf  Type: plaque  Additional Comments: raised red and warm area to lt calf   Wound Image    Distribution localized   Color red;pink   Configuration/Shape symmetrical   Borders raised;distinct   Characteristics raised   Rash 04/17/24 1443 Right lower leg macular   Placement Date/Time: 04/17/24 1443   Side: Right  Orientation: lower  Location: leg  Type: macular   Wound Image     Distribution localized   Color red   Configuration/Shape symmetrical   Borders distinct;raised   Characteristics raised          Wound Check / Follow-up:  Patient seen today for wound follow-up / wound check. Patient is sitting up in bed, awake, alert and oriented. Patient is agreeable to assessment of anterior aspect of body but the posterior aspect.     Left posterior lower leg continues to have a raised reddened area. No active drainage noted. Recommending to continue topical treatments. Right posterior and right anterior lower leg also with noted reddened raised tissue. Recommending to continue topical treatments along with skin care / hygiene and to try and provide offloading or to prevent friction / shearing on mattress /sheets when possible.     Right distal foot with only one area of superficial tissue trauma noted. There is some noted dried crusting but also a very small area with pink moist tissue. Cleansed with NS and gauze. Recommending to continue current care with non-adherent petroleum based gauze and silicone border dressing.     Right anterior medial ankle with small shallow are of tissue loss. Wound base is red and moist. Cleansed with NS and gauze. Blotted dry. Recommending to continue current wound care with petroleum based gauze and bandaid or dry dressing.     MASD within skin fold under abdomen, to bilateral groin and to perineum is improving with pink / red dry skin  noted. No signs of erosion at present time.     Impression: moisture and redness within skin folds, Right medial ankle and right distal foot traumatic injuries. Redness and raised tissue noted to bilateral lower legs.     Short term goals:  Regain skin integrity. Topical treatments. Daily dressings, skin protection and moisture prevention, pressure reduction.     Bettye Crews RN    4/19/2024    16:20 EDT

## 2024-04-20 LAB
GLUCOSE BLDC GLUCOMTR-MCNC: 115 MG/DL (ref 70–99)
GLUCOSE BLDC GLUCOMTR-MCNC: 131 MG/DL (ref 70–99)
GLUCOSE BLDC GLUCOMTR-MCNC: 139 MG/DL (ref 70–99)
GLUCOSE BLDC GLUCOMTR-MCNC: 95 MG/DL (ref 70–99)

## 2024-04-20 PROCEDURE — 82948 REAGENT STRIP/BLOOD GLUCOSE: CPT | Performed by: INTERNAL MEDICINE

## 2024-04-20 PROCEDURE — 63710000001 INSULIN DETEMIR PER 5 UNITS: Performed by: PHYSICIAN ASSISTANT

## 2024-04-20 PROCEDURE — 82948 REAGENT STRIP/BLOOD GLUCOSE: CPT

## 2024-04-20 RX ORDER — OXYCODONE AND ACETAMINOPHEN 10; 325 MG/1; MG/1
1 TABLET ORAL EVERY 8 HOURS PRN
Status: DISPENSED | OUTPATIENT
Start: 2024-04-20 | End: 2024-04-29

## 2024-04-20 RX ADMIN — INSULIN DETEMIR 40 UNITS: 100 INJECTION, SOLUTION SUBCUTANEOUS at 08:42

## 2024-04-20 RX ADMIN — ACETAMINOPHEN 650 MG: 325 TABLET ORAL at 14:07

## 2024-04-20 RX ADMIN — TRIAMCINOLONE ACETONIDE 1 APPLICATION: 1 OINTMENT TOPICAL at 20:39

## 2024-04-20 RX ADMIN — PREGABALIN 100 MG: 100 CAPSULE ORAL at 08:40

## 2024-04-20 RX ADMIN — APIXABAN 5 MG: 5 TABLET, FILM COATED ORAL at 08:40

## 2024-04-20 RX ADMIN — CYANOCOBALAMIN TAB 500 MCG 1000 MCG: 500 TAB at 08:40

## 2024-04-20 RX ADMIN — INSULIN DETEMIR 40 UNITS: 100 INJECTION, SOLUTION SUBCUTANEOUS at 20:36

## 2024-04-20 RX ADMIN — OXYCODONE AND ACETAMINOPHEN 1 TABLET: 10; 325 TABLET ORAL at 23:58

## 2024-04-20 RX ADMIN — OXYCODONE AND ACETAMINOPHEN 1 TABLET: 10; 325 TABLET ORAL at 15:45

## 2024-04-20 RX ADMIN — Medication: at 20:38

## 2024-04-20 RX ADMIN — BACLOFEN 10 MG: 10 TABLET ORAL at 07:28

## 2024-04-20 RX ADMIN — Medication 10 MG: at 20:36

## 2024-04-20 RX ADMIN — IBUPROFEN 400 MG: 400 TABLET ORAL at 02:19

## 2024-04-20 RX ADMIN — Medication 500 MG: at 08:41

## 2024-04-20 RX ADMIN — SERTRALINE HYDROCHLORIDE 50 MG: 50 TABLET ORAL at 20:36

## 2024-04-20 RX ADMIN — DICLOFENAC SODIUM 4 G: 10 GEL TOPICAL at 13:20

## 2024-04-20 RX ADMIN — LISINOPRIL 2.5 MG: 2.5 TABLET ORAL at 08:41

## 2024-04-20 RX ADMIN — PREGABALIN 100 MG: 100 CAPSULE ORAL at 20:36

## 2024-04-20 RX ADMIN — Medication 500 MG: at 20:36

## 2024-04-20 RX ADMIN — OXYCODONE AND ACETAMINOPHEN 1 TABLET: 10; 325 TABLET ORAL at 07:28

## 2024-04-20 RX ADMIN — TRIAMCINOLONE ACETONIDE 1 APPLICATION: 1 OINTMENT TOPICAL at 08:42

## 2024-04-20 RX ADMIN — FUROSEMIDE 40 MG: 40 TABLET ORAL at 08:41

## 2024-04-20 RX ADMIN — EMPAGLIFLOZIN 10 MG: 10 TABLET, FILM COATED ORAL at 08:41

## 2024-04-20 RX ADMIN — DICLOFENAC SODIUM 4 G: 10 GEL TOPICAL at 08:41

## 2024-04-20 RX ADMIN — APIXABAN 5 MG: 5 TABLET, FILM COATED ORAL at 20:36

## 2024-04-20 RX ADMIN — ATORVASTATIN CALCIUM 10 MG: 10 TABLET, FILM COATED ORAL at 20:36

## 2024-04-20 RX ADMIN — DICLOFENAC SODIUM 4 G: 10 GEL TOPICAL at 18:41

## 2024-04-20 RX ADMIN — BACLOFEN 10 MG: 10 TABLET ORAL at 15:45

## 2024-04-20 RX ADMIN — PREGABALIN 100 MG: 100 CAPSULE ORAL at 16:55

## 2024-04-20 RX ADMIN — DICLOFENAC SODIUM 4 G: 10 GEL TOPICAL at 20:37

## 2024-04-20 RX ADMIN — FERROUS SULFATE TAB 325 MG (65 MG ELEMENTAL FE) 325 MG: 325 (65 FE) TAB at 08:42

## 2024-04-20 RX ADMIN — BACLOFEN 10 MG: 10 TABLET ORAL at 23:58

## 2024-04-20 NOTE — PLAN OF CARE
Goal Outcome Evaluation:  Plan of Care Reviewed With: patient        Progress: no change  Outcome Evaluation: Resident alert, oriented, able to make needs known to staff. Remains bedbound. Continues with incont episodes of bowel as well as continent. Blood glucose stable for all 3 meals. Medicated with prn percocet and Baclofen at 07:28 and 15:45, effective. medicated with prn Tylenol at 14:07, effective. Resident pleasant and cooperative.

## 2024-04-20 NOTE — PLAN OF CARE
Goal Outcome Evaluation:  Plan of Care Reviewed With: patient.discussed importance of keeping legs on pillows to keep feet off of bed and prevent pressure ulcers. Pt states it hurts his hip does not want a pillow under his legs. Pain med Percocet and Baclofen given at 2201, Ibuprofen at 0219. Blood glucose 138, BM X3. No changes in status.  .

## 2024-04-21 LAB
GLUCOSE BLDC GLUCOMTR-MCNC: 113 MG/DL (ref 70–99)
GLUCOSE BLDC GLUCOMTR-MCNC: 122 MG/DL (ref 70–99)
GLUCOSE BLDC GLUCOMTR-MCNC: 135 MG/DL (ref 70–99)
GLUCOSE BLDC GLUCOMTR-MCNC: 140 MG/DL (ref 70–99)

## 2024-04-21 PROCEDURE — 82948 REAGENT STRIP/BLOOD GLUCOSE: CPT | Performed by: INTERNAL MEDICINE

## 2024-04-21 PROCEDURE — 82948 REAGENT STRIP/BLOOD GLUCOSE: CPT

## 2024-04-21 PROCEDURE — 63710000001 INSULIN DETEMIR PER 5 UNITS: Performed by: PHYSICIAN ASSISTANT

## 2024-04-21 RX ADMIN — CYANOCOBALAMIN TAB 500 MCG 1000 MCG: 500 TAB at 08:24

## 2024-04-21 RX ADMIN — DICLOFENAC SODIUM 4 G: 10 GEL TOPICAL at 17:26

## 2024-04-21 RX ADMIN — Medication 10 MG: at 21:00

## 2024-04-21 RX ADMIN — FERROUS SULFATE TAB 325 MG (65 MG ELEMENTAL FE) 325 MG: 325 (65 FE) TAB at 08:24

## 2024-04-21 RX ADMIN — LISINOPRIL 2.5 MG: 2.5 TABLET ORAL at 08:24

## 2024-04-21 RX ADMIN — FUROSEMIDE 40 MG: 40 TABLET ORAL at 08:24

## 2024-04-21 RX ADMIN — IBUPROFEN 400 MG: 400 TABLET ORAL at 04:17

## 2024-04-21 RX ADMIN — DICLOFENAC SODIUM 4 G: 10 GEL TOPICAL at 11:38

## 2024-04-21 RX ADMIN — BACLOFEN 10 MG: 10 TABLET ORAL at 16:40

## 2024-04-21 RX ADMIN — ACETAMINOPHEN 650 MG: 325 TABLET ORAL at 11:36

## 2024-04-21 RX ADMIN — OXYCODONE AND ACETAMINOPHEN 1 TABLET: 10; 325 TABLET ORAL at 16:40

## 2024-04-21 RX ADMIN — EMPAGLIFLOZIN 10 MG: 10 TABLET, FILM COATED ORAL at 08:24

## 2024-04-21 RX ADMIN — BACLOFEN 10 MG: 10 TABLET ORAL at 08:24

## 2024-04-21 RX ADMIN — TRIAMCINOLONE ACETONIDE 1 APPLICATION: 1 OINTMENT TOPICAL at 08:25

## 2024-04-21 RX ADMIN — APIXABAN 5 MG: 5 TABLET, FILM COATED ORAL at 21:00

## 2024-04-21 RX ADMIN — APIXABAN 5 MG: 5 TABLET, FILM COATED ORAL at 08:24

## 2024-04-21 RX ADMIN — Medication 500 MG: at 08:24

## 2024-04-21 RX ADMIN — PREGABALIN 100 MG: 100 CAPSULE ORAL at 16:23

## 2024-04-21 RX ADMIN — DICLOFENAC SODIUM 4 G: 10 GEL TOPICAL at 21:03

## 2024-04-21 RX ADMIN — PREGABALIN 100 MG: 100 CAPSULE ORAL at 21:01

## 2024-04-21 RX ADMIN — INSULIN DETEMIR 40 UNITS: 100 INJECTION, SOLUTION SUBCUTANEOUS at 08:23

## 2024-04-21 RX ADMIN — DICLOFENAC SODIUM 4 G: 10 GEL TOPICAL at 08:25

## 2024-04-21 RX ADMIN — OXYCODONE AND ACETAMINOPHEN 1 TABLET: 10; 325 TABLET ORAL at 08:24

## 2024-04-21 RX ADMIN — Medication 500 MG: at 21:00

## 2024-04-21 RX ADMIN — SERTRALINE HYDROCHLORIDE 50 MG: 50 TABLET ORAL at 21:00

## 2024-04-21 RX ADMIN — INSULIN DETEMIR 40 UNITS: 100 INJECTION, SOLUTION SUBCUTANEOUS at 21:00

## 2024-04-21 RX ADMIN — PREGABALIN 100 MG: 100 CAPSULE ORAL at 08:23

## 2024-04-21 RX ADMIN — ATORVASTATIN CALCIUM 10 MG: 10 TABLET, FILM COATED ORAL at 21:01

## 2024-04-21 NOTE — PLAN OF CARE
Goal Outcome Evaluation:  Plan of Care Reviewed With: patient        Progress: no change  Outcome Evaluation: Resident alert, oriented, able to make needs known to staff. Remains bedbound. medicated with prn Baclofen and Percocet at 08:24 and 16:40, effective, prn Tylenol at 11:36, effective. Stated he is so tired of being in pain and can't wait for it to stop after hip surgery. continues with bowel incontinence, as well as continence. Blood glucose stable for all 3 meals. Resident pleasant and cooperative.

## 2024-04-22 LAB
GLUCOSE BLDC GLUCOMTR-MCNC: 125 MG/DL (ref 70–99)
GLUCOSE BLDC GLUCOMTR-MCNC: 172 MG/DL (ref 70–99)
GLUCOSE BLDC GLUCOMTR-MCNC: 187 MG/DL (ref 70–99)
GLUCOSE BLDC GLUCOMTR-MCNC: 228 MG/DL (ref 70–99)
GLUCOSE BLDC GLUCOMTR-MCNC: 228 MG/DL (ref 70–99)
GLUCOSE BLDC GLUCOMTR-MCNC: 269 MG/DL (ref 70–99)

## 2024-04-22 PROCEDURE — 63710000001 INSULIN DETEMIR PER 5 UNITS: Performed by: PHYSICIAN ASSISTANT

## 2024-04-22 PROCEDURE — 82948 REAGENT STRIP/BLOOD GLUCOSE: CPT

## 2024-04-22 PROCEDURE — 82948 REAGENT STRIP/BLOOD GLUCOSE: CPT | Performed by: INTERNAL MEDICINE

## 2024-04-22 PROCEDURE — 63710000001 INSULIN LISPRO (HUMAN) PER 5 UNITS: Performed by: INTERNAL MEDICINE

## 2024-04-22 RX ADMIN — CYANOCOBALAMIN TAB 500 MCG 1000 MCG: 500 TAB at 08:46

## 2024-04-22 RX ADMIN — DICLOFENAC SODIUM 4 G: 10 GEL TOPICAL at 18:09

## 2024-04-22 RX ADMIN — BACLOFEN 10 MG: 10 TABLET ORAL at 15:51

## 2024-04-22 RX ADMIN — TRIAMCINOLONE ACETONIDE 1 APPLICATION: 1 OINTMENT TOPICAL at 08:46

## 2024-04-22 RX ADMIN — PREGABALIN 100 MG: 100 CAPSULE ORAL at 08:46

## 2024-04-22 RX ADMIN — FERROUS SULFATE TAB 325 MG (65 MG ELEMENTAL FE) 325 MG: 325 (65 FE) TAB at 07:47

## 2024-04-22 RX ADMIN — APIXABAN 5 MG: 5 TABLET, FILM COATED ORAL at 08:46

## 2024-04-22 RX ADMIN — IBUPROFEN 400 MG: 400 TABLET ORAL at 01:41

## 2024-04-22 RX ADMIN — FUROSEMIDE 40 MG: 40 TABLET ORAL at 08:46

## 2024-04-22 RX ADMIN — INSULIN LISPRO 4 UNITS: 100 INJECTION, SOLUTION INTRAVENOUS; SUBCUTANEOUS at 20:29

## 2024-04-22 RX ADMIN — Medication 10 MG: at 20:30

## 2024-04-22 RX ADMIN — TRIAMCINOLONE ACETONIDE 1 APPLICATION: 1 OINTMENT TOPICAL at 20:30

## 2024-04-22 RX ADMIN — OXYCODONE AND ACETAMINOPHEN 1 TABLET: 10; 325 TABLET ORAL at 00:45

## 2024-04-22 RX ADMIN — DICLOFENAC SODIUM 4 G: 10 GEL TOPICAL at 12:47

## 2024-04-22 RX ADMIN — PREGABALIN 100 MG: 100 CAPSULE ORAL at 20:30

## 2024-04-22 RX ADMIN — BACLOFEN 10 MG: 10 TABLET ORAL at 00:45

## 2024-04-22 RX ADMIN — DICLOFENAC SODIUM 4 G: 10 GEL TOPICAL at 20:30

## 2024-04-22 RX ADMIN — EMPAGLIFLOZIN 10 MG: 10 TABLET, FILM COATED ORAL at 08:46

## 2024-04-22 RX ADMIN — INSULIN LISPRO 4 UNITS: 100 INJECTION, SOLUTION INTRAVENOUS; SUBCUTANEOUS at 07:47

## 2024-04-22 RX ADMIN — ATORVASTATIN CALCIUM 10 MG: 10 TABLET, FILM COATED ORAL at 20:30

## 2024-04-22 RX ADMIN — INSULIN DETEMIR 40 UNITS: 100 INJECTION, SOLUTION SUBCUTANEOUS at 08:45

## 2024-04-22 RX ADMIN — SERTRALINE HYDROCHLORIDE 50 MG: 50 TABLET ORAL at 20:31

## 2024-04-22 RX ADMIN — INSULIN LISPRO 4 UNITS: 100 INJECTION, SOLUTION INTRAVENOUS; SUBCUTANEOUS at 17:23

## 2024-04-22 RX ADMIN — OXYCODONE AND ACETAMINOPHEN 1 TABLET: 10; 325 TABLET ORAL at 15:52

## 2024-04-22 RX ADMIN — APIXABAN 5 MG: 5 TABLET, FILM COATED ORAL at 20:30

## 2024-04-22 RX ADMIN — Medication 500 MG: at 08:45

## 2024-04-22 RX ADMIN — INSULIN DETEMIR 40 UNITS: 100 INJECTION, SOLUTION SUBCUTANEOUS at 20:28

## 2024-04-22 RX ADMIN — LISINOPRIL 2.5 MG: 2.5 TABLET ORAL at 08:46

## 2024-04-22 RX ADMIN — PREGABALIN 100 MG: 100 CAPSULE ORAL at 15:51

## 2024-04-22 RX ADMIN — DICLOFENAC SODIUM 4 G: 10 GEL TOPICAL at 07:47

## 2024-04-22 RX ADMIN — Medication 500 MG: at 20:30

## 2024-04-22 NOTE — PLAN OF CARE
Goal Outcome Evaluation:  Plan of Care Reviewed With: patient        Progress: no change  Outcome Evaluation: Alert and oriented x4; able to make needs known to staff. Baclofen, Percocet and Motrin administered for left shoulder and hip pain with relief. Able to shift weight and assist with turning to be cleaned up after incontinent episodes of stool but refuses turns. Call light within reach; care plan ongoing.

## 2024-04-22 NOTE — PLAN OF CARE
Goal Outcome Evaluation:   Alert and oriented and pleasant with staff. X2 max assist for transfers and ambulation. X2 admin PRN pain medication, with relief of symptoms noted. No other significant changes noted this shift. Sitting up in bed, call light in reach.

## 2024-04-23 LAB
GLUCOSE BLDC GLUCOMTR-MCNC: 126 MG/DL (ref 70–99)
GLUCOSE BLDC GLUCOMTR-MCNC: 130 MG/DL (ref 70–99)
GLUCOSE BLDC GLUCOMTR-MCNC: 153 MG/DL (ref 70–99)
GLUCOSE BLDC GLUCOMTR-MCNC: 191 MG/DL (ref 70–99)

## 2024-04-23 PROCEDURE — 63710000001 INSULIN LISPRO (HUMAN) PER 5 UNITS: Performed by: INTERNAL MEDICINE

## 2024-04-23 PROCEDURE — 63710000001 INSULIN DETEMIR PER 5 UNITS: Performed by: PHYSICIAN ASSISTANT

## 2024-04-23 PROCEDURE — 99308 SBSQ NF CARE LOW MDM 20: CPT | Performed by: PHYSICIAN ASSISTANT

## 2024-04-23 PROCEDURE — 82948 REAGENT STRIP/BLOOD GLUCOSE: CPT

## 2024-04-23 PROCEDURE — 82948 REAGENT STRIP/BLOOD GLUCOSE: CPT | Performed by: INTERNAL MEDICINE

## 2024-04-23 RX ADMIN — LISINOPRIL 2.5 MG: 2.5 TABLET ORAL at 08:01

## 2024-04-23 RX ADMIN — INSULIN DETEMIR 40 UNITS: 100 INJECTION, SOLUTION SUBCUTANEOUS at 08:03

## 2024-04-23 RX ADMIN — OXYCODONE AND ACETAMINOPHEN 1 TABLET: 10; 325 TABLET ORAL at 08:01

## 2024-04-23 RX ADMIN — Medication 500 MG: at 21:09

## 2024-04-23 RX ADMIN — OXYCODONE AND ACETAMINOPHEN 1 TABLET: 10; 325 TABLET ORAL at 16:50

## 2024-04-23 RX ADMIN — Medication 500 MG: at 08:01

## 2024-04-23 RX ADMIN — APIXABAN 5 MG: 5 TABLET, FILM COATED ORAL at 08:01

## 2024-04-23 RX ADMIN — IBUPROFEN 400 MG: 400 TABLET ORAL at 14:44

## 2024-04-23 RX ADMIN — FERROUS SULFATE TAB 325 MG (65 MG ELEMENTAL FE) 325 MG: 325 (65 FE) TAB at 08:00

## 2024-04-23 RX ADMIN — IBUPROFEN 400 MG: 400 TABLET ORAL at 02:40

## 2024-04-23 RX ADMIN — PREGABALIN 100 MG: 100 CAPSULE ORAL at 08:01

## 2024-04-23 RX ADMIN — PREGABALIN 100 MG: 100 CAPSULE ORAL at 21:09

## 2024-04-23 RX ADMIN — CYANOCOBALAMIN TAB 500 MCG 1000 MCG: 500 TAB at 08:00

## 2024-04-23 RX ADMIN — DICLOFENAC SODIUM 4 G: 10 GEL TOPICAL at 17:34

## 2024-04-23 RX ADMIN — ATORVASTATIN CALCIUM 10 MG: 10 TABLET, FILM COATED ORAL at 21:09

## 2024-04-23 RX ADMIN — ACETAMINOPHEN 650 MG: 325 TABLET ORAL at 21:09

## 2024-04-23 RX ADMIN — BACLOFEN 10 MG: 10 TABLET ORAL at 00:00

## 2024-04-23 RX ADMIN — INSULIN DETEMIR 40 UNITS: 100 INJECTION, SOLUTION SUBCUTANEOUS at 21:10

## 2024-04-23 RX ADMIN — OXYCODONE AND ACETAMINOPHEN 1 TABLET: 10; 325 TABLET ORAL at 00:00

## 2024-04-23 RX ADMIN — INSULIN LISPRO 4 UNITS: 100 INJECTION, SOLUTION INTRAVENOUS; SUBCUTANEOUS at 21:10

## 2024-04-23 RX ADMIN — EMPAGLIFLOZIN 10 MG: 10 TABLET, FILM COATED ORAL at 08:01

## 2024-04-23 RX ADMIN — DICLOFENAC SODIUM 4 G: 10 GEL TOPICAL at 08:00

## 2024-04-23 RX ADMIN — Medication 10 MG: at 21:09

## 2024-04-23 RX ADMIN — INSULIN LISPRO 4 UNITS: 100 INJECTION, SOLUTION INTRAVENOUS; SUBCUTANEOUS at 08:03

## 2024-04-23 RX ADMIN — APIXABAN 5 MG: 5 TABLET, FILM COATED ORAL at 21:09

## 2024-04-23 RX ADMIN — TRIAMCINOLONE ACETONIDE 1 APPLICATION: 1 OINTMENT TOPICAL at 08:00

## 2024-04-23 RX ADMIN — BACLOFEN 10 MG: 10 TABLET ORAL at 16:50

## 2024-04-23 RX ADMIN — DICLOFENAC SODIUM 4 G: 10 GEL TOPICAL at 11:59

## 2024-04-23 RX ADMIN — SERTRALINE HYDROCHLORIDE 50 MG: 50 TABLET ORAL at 21:09

## 2024-04-23 RX ADMIN — DICLOFENAC SODIUM 4 G: 10 GEL TOPICAL at 21:10

## 2024-04-23 RX ADMIN — BACLOFEN 10 MG: 10 TABLET ORAL at 08:01

## 2024-04-23 RX ADMIN — FUROSEMIDE 40 MG: 40 TABLET ORAL at 08:04

## 2024-04-23 NOTE — PLAN OF CARE
Goal Outcome Evaluation:   Alert and oriented.  Medicated for pain.

## 2024-04-23 NOTE — PROGRESS NOTES
[facility]   Hospitalist Progress Note  Date: 2024  Patient Name: Jose Shaikh  : 1960  MRN: [id number]  Date of admission: 2023      Subjective   Subjective     Chief Complaint: Weakness    Summary: Jose Shaikh is a 63 y.o. male  initially hospitalized on 9/10/2023, prolonged hospitalization for treatment of management of generalized weakness, deconditioning, with acute issues of diarrhea, C. difficile negative.  Diarrhea improved.  Had small hematoma after ambulating on his foot.  Unfortunately with exhaustive efforts to try to place this gentleman after 39 days of hospitalization, we are unable to find a facility that will accept him.  He has no further acute needs or requirements for inpatient monitoring and management, his labs and vitals are stable, he is tolerating oral intake, and has been refusing turns and repositionings and other interventions with nursing staff despite recommendations.  Patient discharged in hemodynamically stable condition on 10/19/2023 to follow-up with PCP within 1 week. Unfortunately we cannot solve all of his social issues during this hospitalization due to lack of resources and participation from the patient's perspective despite all our efforts.  Patient has a financial means of making arrangements at home.  Patient appealed his discharge.  Lost his appeal.  LCD: 10/20/23, PFL beginning: 10/21/23 @ 12pm.  Due to an inability to place the patient in a.m. nursing home he has been placed in our skilled nursing facility until arrangements can be made or until he is able to care for himself.      Interval Followup: 2024    Tolerating Ozempic.  Recently Increased to 1 mg/weekly   Tolerating Lasix.  Dependent edema hips/pannus improved.  Will continue diuretic.    Check BMP tomorrow   Patient willing to try working with PT/OT.  Will reorder.     Review of systems: All systems reviewed and negative except what is noted above in interval follow-up    Objective   Objective     Vitals:   Temp:  [97.5 °F (36.4 °C)-97.9 °F (36.6 °C)] 97.5 °F (36.4 °C)  Heart Rate:  [70-74] 70  Resp:  [18] 18  BP: (100-103)/(48-53) 103/48    Physical Exam   Constitutional: No distress.  Alert and conversational.  Respiratory: No wheeze noted.  GI: Abdomen: Obese  Neuro/psych:  Calm mood.  No dysarthria.  Extremities: Some lower extremity edema noted    Result Review    Result Review:  I have personally reviewed the results from the time of this admission to 4/23/2024 11:19 EDT and agree with these findings:  [x]  Laboratory  [x]  Microbiology  []  Radiology  []  EKG/Telemetry   []  Cardiology/Vascular   []  Pathology  []  Old records  []  Other:    Assessment & Plan   Assessment / Plan   Assessment:  Hospital-acquired weakness  Hx of Influenza A  Hx of C. difficile diarrhea treated  Generalized weakness  Deconditioning  DM (Kami Garcia and PCP)  HTN  PAF (on Eliquis; previously seen Dr. 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    CODE STATUS:   Code Status (Patient has no pulse and is not breathing): CPR (Attempt to Resuscitate)  Medical Interventions (Patient has pulse or is breathing): Full Support

## 2024-04-23 NOTE — PLAN OF CARE
Goal Outcome Evaluation:  Plan of Care Reviewed With: patient        Progress: no change  Outcome Evaluation: AOx4, able to make needs known to staffing. Call light and personal items within reach. 1x to the bedpan, urinal at bedside. Help with turning. Wound care RN follow-up of dressings. Medicated for pain. Con't plan of care

## 2024-04-23 NOTE — SIGNIFICANT NOTE
Wound Eval / Progress Noted    KEO Dominguez     Patient Name: Jose Shaikh  : 1960  MRN: [id number]  Today's Date: 2024                 Admit Date: 2023    Visit Dx:    ICD-10-CM ICD-9-CM   1. Difficulty in walking  R26.2 719.7   2. Type 2 diabetes mellitus with other specified complication, unspecified whether long term insulin use  E11.69 250.80         Debility    Ulcer of right foot    Ulcerated, foot, left, limited to breakdown of skin    Onychomycosis        Past Medical History:   Diagnosis Date    Absence of toe of right foot     Acute osteomyelitis of left calcaneus  2021    Anxiety and depression     Arthritis     Cancer     Chronic pain     STATES HIS PAIN IS 10/10 AAT    Claustrophobia     Corns and callus     Diabetic ulcer of left heel associated with type 2 DM 2021    Diabetic ulcer of left heel associated with type 2 DM 2021    Diabetic ulcer of right midfoot associated with type 2 DM 2021    Difficulty walking     Essential hypertension 2021    Hammertoe     Hyperlipidemia LDL goal <100 2021    Ingrown toenail     Obesity     Paroxysmal atrial fibrillation 2021    Polyneuropathy     Pressure ulcer, stage 1     Tinea unguium     Type 2 diabetes mellitus with polyneuropathy         Past Surgical History:   Procedure Laterality Date    CYST REMOVAL      center of back; benign    EYE SURGERY      INCISION AND DRAINAGE ABSCESS      back    INCISION AND DRAINAGE LEG Right 12/10/2021    Procedure: INCISION AND DRAINAGE LOWER EXTREMITY;  Surgeon: Ash Leyva DPM;  Location: [facility] MAIN OR;  Service: Podiatry;  Laterality: Right;    OTHER SURGICAL HISTORY      Surgical clips left foot    TOE SURGERY Right     Removal of 5th toe    TRANS METATARSAL AMPUTATION Right 2021    Procedure: AMPUTATION TRANS METATARSAL;  Surgeon: Ash Leyva DPM;  Location: [facility] MAIN OR;  Service: Podiatry;  Laterality: Right;    VASCULAR  SURGERY      WRIST SURGERY Left     repair of injury         Physical Assessment:     04/23/24 1025   Wound 03/27/24 1045 Right anterior Skin Tear   Placement Date/Time: 03/27/24 1045   Side: Right  Orientation: anterior  Primary Wound Type: Skin Tear   Wound Image    Dressing Appearance intact;no drainage   Closure None   Base dry;pink;red   Periwound intact;dry;pink   Periwound Temperature warm   Periwound Skin Turgor soft   Edges rolled/closed   Drainage Amount none   Care, Wound cleansed with;sterile normal saline   Dressing Care open to air   Wound 03/28/24 1225 Right medial ankle Traumatic   Placement Date/Time: 03/28/24 1225   Side: Right  Orientation: medial  Location: ankle  Primary Wound Type: Traumatic   Wound Image    Dressing Appearance intact;no drainage   Closure None   Base red;pink;dry;scab   Periwound intact;dry   Periwound Temperature warm   Periwound Skin Turgor soft   Edges rolled/closed   Drainage Amount none   Care, Wound cleansed with;sterile normal saline   Dressing Care open to air     Right posterior leg    Right anterior lower leg    Left posterior leg      Wound Check / Follow-up:  Patient seen today for wound follow-up / wound check. Patient is currently sitting up in bed, awake, alert and oriented. He is agreeable to assessment.     Patient's dressing to right anterior ankle and right distal foot removed. Right anterior ankle with only small area of crusting noted. Tissue is dry and intact. Cleansed with NS and gauze. Right distal foot with closed intact epithelium with only one small area of intermittent darkened dry tissue. There is no open tissue noted. Cleansed with NS and gauze. Recommending to leave tissue open to air and perform skin care / hygiene twice daily.     Moisture within skin folds remains present but is overall improved. Area most affected at present time is left abdominal fold. Bilateral groin are pink and moist but no fungal presentation noted. Recommending to  continue skin care / hygiene with topical applications for moisture prevention.    Chronic discoloration to lower legs noted. Posterior areas to bilateral lower legs with raised, irregular tissue discoloration is improving from assessment last week. Recommending to continue topical treatments, but may need to consider discontinuing steroid at present time. Skin care / hygiene and offloading is recommended.     Patient is declining assessment of posterior aspect of body at present time. He states he has no wounds and is find.     Impression: Chronic discoloration to lower extremities. Raised irregular tissue to posterior lower legs improving. Resolving traumatic injuries to right anterior ankle / right distal foot. Moisture with redness to skin folds.     Short term goals:  Regain skin integrity. Topical treatments. Skin protection, moisture prevention, pressure reduction. Skin care / hygiene.     Bettye Crews RN    4/23/2024    12:46 EDT

## 2024-04-24 LAB
ANION GAP SERPL CALCULATED.3IONS-SCNC: 8.9 MMOL/L (ref 5–15)
BUN SERPL-MCNC: 18 MG/DL (ref 8–23)
BUN/CREAT SERPL: 41.9 (ref 7–25)
CALCIUM SPEC-SCNC: 8.3 MG/DL (ref 8.6–10.5)
CHLORIDE SERPL-SCNC: 105 MMOL/L (ref 98–107)
CO2 SERPL-SCNC: 25.1 MMOL/L (ref 22–29)
CREAT SERPL-MCNC: 0.43 MG/DL (ref 0.76–1.27)
EGFRCR SERPLBLD CKD-EPI 2021: 118.5 ML/MIN/1.73
GLUCOSE BLDC GLUCOMTR-MCNC: 123 MG/DL (ref 70–99)
GLUCOSE BLDC GLUCOMTR-MCNC: 123 MG/DL (ref 70–99)
GLUCOSE BLDC GLUCOMTR-MCNC: 124 MG/DL (ref 70–99)
GLUCOSE BLDC GLUCOMTR-MCNC: 145 MG/DL (ref 70–99)
GLUCOSE BLDC GLUCOMTR-MCNC: 261 MG/DL (ref 70–99)
GLUCOSE SERPL-MCNC: 107 MG/DL (ref 65–99)
MAGNESIUM SERPL-MCNC: 1.9 MG/DL (ref 1.6–2.4)
POTASSIUM SERPL-SCNC: 4 MMOL/L (ref 3.5–5.2)
SODIUM SERPL-SCNC: 139 MMOL/L (ref 136–145)

## 2024-04-24 PROCEDURE — 97530 THERAPEUTIC ACTIVITIES: CPT

## 2024-04-24 PROCEDURE — 97161 PT EVAL LOW COMPLEX 20 MIN: CPT

## 2024-04-24 PROCEDURE — 63710000001 ONDANSETRON ODT 4 MG TABLET DISPERSIBLE: Performed by: INTERNAL MEDICINE

## 2024-04-24 PROCEDURE — 80048 BASIC METABOLIC PNL TOTAL CA: CPT | Performed by: PHYSICIAN ASSISTANT

## 2024-04-24 PROCEDURE — 97165 OT EVAL LOW COMPLEX 30 MIN: CPT

## 2024-04-24 PROCEDURE — 82948 REAGENT STRIP/BLOOD GLUCOSE: CPT | Performed by: INTERNAL MEDICINE

## 2024-04-24 PROCEDURE — 63710000001 INSULIN DETEMIR PER 5 UNITS: Performed by: PHYSICIAN ASSISTANT

## 2024-04-24 PROCEDURE — 83735 ASSAY OF MAGNESIUM: CPT | Performed by: PHYSICIAN ASSISTANT

## 2024-04-24 PROCEDURE — 82948 REAGENT STRIP/BLOOD GLUCOSE: CPT

## 2024-04-24 RX ADMIN — ATORVASTATIN CALCIUM 10 MG: 10 TABLET, FILM COATED ORAL at 20:32

## 2024-04-24 RX ADMIN — ONDANSETRON 4 MG: 4 TABLET, ORALLY DISINTEGRATING ORAL at 16:28

## 2024-04-24 RX ADMIN — OXYCODONE AND ACETAMINOPHEN 1 TABLET: 10; 325 TABLET ORAL at 08:12

## 2024-04-24 RX ADMIN — DICLOFENAC SODIUM 4 G: 10 GEL TOPICAL at 17:34

## 2024-04-24 RX ADMIN — Medication 10 MG: at 20:32

## 2024-04-24 RX ADMIN — DICLOFENAC SODIUM 4 G: 10 GEL TOPICAL at 20:33

## 2024-04-24 RX ADMIN — APIXABAN 5 MG: 5 TABLET, FILM COATED ORAL at 20:32

## 2024-04-24 RX ADMIN — CYANOCOBALAMIN TAB 500 MCG 1000 MCG: 500 TAB at 08:11

## 2024-04-24 RX ADMIN — IBUPROFEN 400 MG: 400 TABLET ORAL at 18:22

## 2024-04-24 RX ADMIN — PREGABALIN 100 MG: 100 CAPSULE ORAL at 16:28

## 2024-04-24 RX ADMIN — BACLOFEN 10 MG: 10 TABLET ORAL at 17:34

## 2024-04-24 RX ADMIN — SERTRALINE HYDROCHLORIDE 50 MG: 50 TABLET ORAL at 20:32

## 2024-04-24 RX ADMIN — BACLOFEN 10 MG: 10 TABLET ORAL at 00:21

## 2024-04-24 RX ADMIN — INSULIN DETEMIR 40 UNITS: 100 INJECTION, SOLUTION SUBCUTANEOUS at 20:33

## 2024-04-24 RX ADMIN — PREGABALIN 100 MG: 100 CAPSULE ORAL at 08:11

## 2024-04-24 RX ADMIN — ACETAMINOPHEN 650 MG: 325 TABLET ORAL at 20:32

## 2024-04-24 RX ADMIN — ACETAMINOPHEN 650 MG: 325 TABLET ORAL at 14:55

## 2024-04-24 RX ADMIN — EMPAGLIFLOZIN 10 MG: 10 TABLET, FILM COATED ORAL at 08:11

## 2024-04-24 RX ADMIN — Medication 500 MG: at 08:11

## 2024-04-24 RX ADMIN — INSULIN DETEMIR 40 UNITS: 100 INJECTION, SOLUTION SUBCUTANEOUS at 08:12

## 2024-04-24 RX ADMIN — LISINOPRIL 2.5 MG: 2.5 TABLET ORAL at 08:11

## 2024-04-24 RX ADMIN — OXYCODONE AND ACETAMINOPHEN 1 TABLET: 10; 325 TABLET ORAL at 00:21

## 2024-04-24 RX ADMIN — FUROSEMIDE 40 MG: 40 TABLET ORAL at 08:11

## 2024-04-24 RX ADMIN — PREGABALIN 100 MG: 100 CAPSULE ORAL at 20:32

## 2024-04-24 RX ADMIN — BACLOFEN 10 MG: 10 TABLET ORAL at 08:11

## 2024-04-24 RX ADMIN — APIXABAN 5 MG: 5 TABLET, FILM COATED ORAL at 08:11

## 2024-04-24 RX ADMIN — Medication 500 MG: at 20:33

## 2024-04-24 RX ADMIN — DICLOFENAC SODIUM 4 G: 10 GEL TOPICAL at 12:47

## 2024-04-24 RX ADMIN — DICLOFENAC SODIUM 4 G: 10 GEL TOPICAL at 08:12

## 2024-04-24 RX ADMIN — OXYCODONE AND ACETAMINOPHEN 1 TABLET: 10; 325 TABLET ORAL at 17:34

## 2024-04-24 RX ADMIN — FERROUS SULFATE TAB 325 MG (65 MG ELEMENTAL FE) 325 MG: 325 (65 FE) TAB at 08:11

## 2024-04-24 NOTE — PLAN OF CARE
Goal Outcome Evaluation:  Plan of Care Reviewed With: patient        Progress: no change  Outcome Evaluation: Resident alert, oriented, able to make needs known to staff. Remains bedbound. Started therapy again today, participated as orderd. Resident stood up with therapy and did leg exercises. New wt obtained. New wt 158.8 kg, down 2.5 Kilo from last week. Resident is motivated to continue therapy, continue wt loss and get hip surgery. Medicated for prn pain x2 with percocet and baclofen, effective, medicated with prn Tylenol x1, effective. Resident pleasant and cooperative.

## 2024-04-24 NOTE — PLAN OF CARE
Goal Outcome Evaluation:  Plan of Care Reviewed With: patient           Outcome Evaluation: Patient presents with decreased strength, transfers and ambulation.  Skilled physical therapy services will be required to address these mobility deficits.      Anticipated Discharge Disposition (PT): extended care facility

## 2024-04-24 NOTE — THERAPY EVALUATION
SNF - Occupational Therapy Initial Evaluation   Angelica    Patient Name: Jose Shaikh  : 1960    MRN: [id number]                              Today's Date: 2024       Admit Date: 2023    Visit Dx:     ICD-10-CM ICD-9-CM   1. Difficulty in walking  R26.2 719.7   2. Type 2 diabetes mellitus with other specified complication, unspecified whether long term insulin use  E11.69 250.80     Patient Active Problem List   Diagnosis    Diabetic ulcer of left heel associated with type 2 DM    Acute osteomyelitis of left calcaneus     Diabetic ulcer of left heel associated with type 2 DM    Diabetic ulcer of right midfoot associated with type 2 DM    Paroxysmal atrial fibrillation    Essential hypertension    Hyperlipidemia LDL goal <100    Cellulitis and abscess of foot    High alkaline phosphatase level    Osteomyelitis    Onychomycosis    Onychocryptosis    Foot pain, bilateral    Osteomyelitis of foot, right, acute    Cellulitis of right foot    Type 2 diabetes mellitus, with long-term current use of insulin    Class 3 severe obesity due to excess calories with serious comorbidity and body mass index (BMI) of 45.0 to 49.9 in adult    Anxiety disorder, unspecified    Claustrophobia    Dependence on wheelchair    Depression, unspecified    Long term (current) use of anticoagulants    Long term (current) use of oral hypoglycemic drugs    Wound of foot    Ulcer of right foot    Orthostatic hypotension    Other chronic osteomyelitis, right ankle and foot    Personal history of nicotine dependence    Thrombocytopenia, unspecified    Unspecified open wound, right foot, initial encounter    Diabetic foot infection    Subacute osteomyelitis of right foot    Right foot pain    Sepsis    Onychomycosis    Foot pain, left    Impaired mobility and ADLs    Absence of toe of right foot    Corns and callosity    Disability of walking    Fracture    Limb swelling    Polyneuropathy    Pressure ulcer, stage 1    Shortness  of breath    Generalized weakness    Debility    Ulcerated, foot, left, limited to breakdown of skin    Onychomycosis     Past Medical History:   Diagnosis Date    Absence of toe of right foot     Acute osteomyelitis of left calcaneus  08/18/2021    Anxiety and depression     Arthritis     Cancer     Chronic pain     STATES HIS PAIN IS 10/10 AAT    Claustrophobia     Corns and callus     Diabetic ulcer of left heel associated with type 2 DM 08/18/2021    Diabetic ulcer of left heel associated with type 2 DM 07/06/2021    Diabetic ulcer of right midfoot associated with type 2 DM 08/18/2021    Difficulty walking     Essential hypertension 08/31/2021    Hammertoe     Hyperlipidemia LDL goal <100 08/31/2021    Ingrown toenail     Obesity     Paroxysmal atrial fibrillation 08/31/2021    Polyneuropathy     Pressure ulcer, stage 1     Tinea unguium     Type 2 diabetes mellitus with polyneuropathy      Past Surgical History:   Procedure Laterality Date    CYST REMOVAL      center of back; benign    EYE SURGERY      INCISION AND DRAINAGE ABSCESS      back    INCISION AND DRAINAGE LEG Right 12/10/2021    Procedure: INCISION AND DRAINAGE LOWER EXTREMITY;  Surgeon: Ash Leyva DPM;  Location: [facility] MAIN OR;  Service: Podiatry;  Laterality: Right;    OTHER SURGICAL HISTORY      Surgical clips left foot    TOE SURGERY Right     Removal of 5th toe    TRANS METATARSAL AMPUTATION Right 12/02/2021    Procedure: AMPUTATION TRANS METATARSAL;  Surgeon: Ash Leyva DPM;  Location: [facility] MAIN OR;  Service: Podiatry;  Laterality: Right;    VASCULAR SURGERY      WRIST SURGERY Left     repair of injury      General Information       Row Name 04/24/24 1039 04/24/24 1026       OT Time and Intention    Document Type therapy note (daily note)  -PG evaluation  -PG    Mode of Treatment individual therapy;occupational therapy  -PG individual therapy;occupational therapy  -PG      Row Name 04/24/24 1026          General  Information    Patient Profile Reviewed yes  Patient previously living alone in an apartment setting back in July.  Patient has been in rehab and hospital ever since and bedbound.  -PG     Prior Level of Function max assist:;dependent:;ADL's  -PG     Existing Precautions/Restrictions fall  -PG     Barriers to Rehab none identified  -PG       Row Name 04/24/24 1026          Occupational Profile    Reason for Services/Referral (Occupational Profile) Patient is a unfortunate 63-year-old bedbound male admitted to the hospital in September and then skilled nursing facility in November for debility and weakness.  Patient is being evaluated by Occupational Therapy due to recent decline in ADL function.  No recent OT services noted  -PG       Row Name 04/24/24 1026          Living Environment    People in Home facility resident  -PG       Row Name 04/24/24 1026          Cognition    Orientation Status (Cognition) oriented x 4  -PG       Row Name 04/24/24 1026          Safety Issues, Functional Mobility    Impairments Affecting Function (Mobility) balance;endurance/activity tolerance;pain;range of motion (ROM);strength  -PG               User Key  (r) = Recorded By, (t) = Taken By, (c) = Cosigned By      Initials Name Provider Type    PG Kodak Matos, OT Occupational Therapist                     Mobility/ADL's       Row Name 04/24/24 1031          Bed Mobility    Bed Mobility supine-sit  -PG     Supine-Sit [place] (Bed Mobility) moderate assist (50% patient effort)  -PG       Row Name 04/24/24 1039 04/24/24 1031       Transfers    Transfers sit-stand transfer  -PG sit-stand transfer  -PG      Row Name 04/24/24 1039 04/24/24 1031       Sit-Stand Transfer    Sit-Stand [place] (Transfers) moderate assist (50% patient effort);2 person assist  -PG moderate assist (50% patient effort);2 person assist  -PG    Assistive Device (Sit-Stand Transfers) walker, front-wheeled  -PG walker, front-wheeled  -PG      Row Name  04/24/24 1039          Stand-Sit Transfer    Stand-Sit [place] (Transfers) moderate assist (50% patient effort);2 person assist  -PG     Assistive Device (Stand-Sit Transfers) bariatric;walker, front-wheeled  -PG       Row Name 04/24/24 1031          Bathing Assessment/Intervention    [place] Level (Bathing) bathing skills;upper body;set up  -PG     Position (Bathing) supported sitting  -PG       Row Name 04/24/24 1031          Upper Body Dressing Assessment/Training    [place] Level (Upper Body Dressing) upper body dressing skills;set up  -PG       Row Name 04/24/24 1031          Lower Body Dressing Assessment/Training    [place] Level (Lower Body Dressing) lower body dressing skills;dependent (less than 25% patient effort)  -PG       Row Name 04/24/24 1031          Grooming Assessment/Training    [place] Level (Grooming) grooming skills;set up  -PG       Row Name 04/24/24 1031          Toileting Assessment/Training    [place] Level (Toileting) toileting skills;dependent (less than 25% patient effort)  -PG               User Key  (r) = Recorded By, (t) = Taken By, (c) = Cosigned By      Initials Name Provider Type    PG Kodak Matos OT Occupational Therapist                   Obj/Interventions       Row Name 04/24/24 1035          Sensory Assessment (Somatosensory)    Sensory Assessment (Somatosensory) sensation intact  -PG       Row Name 04/24/24 1035          Vision Assessment/Intervention    Visual Impairment/Limitations WFL  -PG       Row Name 04/24/24 1035          Range of Motion Comprehensive    General Range of Motion no range of motion deficits identified  -PG       Row Name 04/24/24 1035          Strength Comprehensive (MMT)    General Manual Muscle Testing (MMT) Assessment no strength deficits identified  -PG     Comment, General Manual Muscle Testing (MMT) Assessment 5/5 BUE  -PG       Row Name 04/24/24 1035          Motor Skills    Motor Skills coordination;functional  endurance  -PG     Coordination WFL  -PG     Functional Endurance Fair plus  -PG               User Key  (r) = Recorded By, (t) = Taken By, (c) = Cosigned By      Initials Name Provider Type    PG Kodak Matos, OT Occupational Therapist                   Goals/Plan       Row Name 04/24/24 1037          Transfer Goal 1 (OT)    Activity/Assistive Device (Transfer Goal 1, OT) transfers, all  -PG     [place] Level/Cues Needed (Transfer Goal 1, OT) minimum assist (75% or more patient effort)  -PG     Time Frame (Transfer Goal 1, OT) long term goal (LTG);10 days  -PG       Row Name 04/24/24 1037          Bathing Goal 1 (OT)    Activity/Device (Bathing Goal 1, OT) bathing skills, all  -PG     [place] Level/Cues Needed (Bathing Goal 1, OT) supervision required  -PG     Time Frame (Bathing Goal 1, OT) long term goal (LTG);10 days  -PG       Row Name 04/24/24 1037          Dressing Goal 1 (OT)    Activity/Device (Dressing Goal 1, OT) dressing skills, all  -PG     [place]/Cues Needed (Dressing Goal 1, OT) minimum assist (75% or more patient effort)  -PG     Time Frame (Dressing Goal 1, OT) long term goal (LTG);10 days  -PG       Row Name 04/24/24 1037          Toileting Goal 1 (OT)    Activity/Device (Toileting Goal 1, OT) toileting skills, all  -PG     [place] Level/Cues Needed (Toileting Goal 1, OT) minimum assist (75% or more patient effort)  -PG     Time Frame (Toileting Goal 1, OT) long term goal (LTG);10 days  -PG       Row Name 04/24/24 1037          Problem Specific Goal 1 (OT)    Problem Specific Goal 1 (OT) Patient will improve activity tolerance to good to support independence with self-care activity and functional transfers  -PG     Time Frame (Problem Specific Goal 1, OT) long term goal (LTG);10 days  -PG       Row Name 04/24/24 1037          Therapy Assessment/Plan (OT)    Planned Therapy Interventions (OT) activity tolerance training;BADL retraining;transfer/mobility  retraining;patient/caregiver education/training;occupation/activity based interventions;strengthening exercise  -PG               User Key  (r) = Recorded By, (t) = Taken By, (c) = Cosigned By      Initials Name Provider Type    PG Kodak Matos OT Occupational Therapist                   Clinical Impression       Row Name 04/24/24 1035          Pain Assessment    Pretreatment Pain Rating 10/10  -PG     Posttreatment Pain Rating 10/10  -PG     Pain Location - Side/Orientation Left  -PG     Pain Location lower  -PG     Pain Location - extremity  -PG     Pain Intervention(s) Nursing Notified  -PG       Row Name 04/24/24 1035          Plan of Care Review    Plan of Care Reviewed With patient  -PG     Progress no change  -PG     Outcome Evaluation Patient presents with limitations affecting strength, activity tolerance, and balance impacting patient's ability to return home safely and independently.  The skills of a therapist will be required to safely and effectively implement the following treatment plan to restore maximal level of function  -PG       Row Name 04/24/24 1035          Therapy Assessment/Plan (OT)    Patient/Family Therapy Goal Statement (OT) Patient would eventually like to return to his own apartment  -PG     Rehab Potential (OT) fair, will monitor progress closely  -PG     Criteria for Skilled Therapeutic Interventions Met (OT) yes;meets criteria;skilled treatment is necessary  -PG     Therapy Frequency (OT) 5 times/wk  -PG       Row Name 04/24/24 1035          Therapy Plan Review/Discharge Plan (OT)    Anticipated Discharge Disposition (OT) extended care facility  -PG               User Key  (r) = Recorded By, (t) = Taken By, (c) = Cosigned By      Initials Name Provider Type    PG Kodak Matos OT Occupational Therapist                   Outcome Measures       Row Name 04/24/24 1038          How much help from another is currently needed...    Putting on and taking off regular lower body clothing?  1  -PG     Bathing (including washing, rinsing, and drying) 2  -PG     Toileting (which includes using toilet bed pan or urinal) 1  -PG     Putting on and taking off regular upper body clothing 4  -PG     Taking care of personal grooming (such as brushing teeth) 4  -PG     Eating meals 4  -PG     AM-PAC 6 Clicks Score (OT) 16  -PG       Row Name 04/24/24 1038          Functional Assessment    Outcome Measure Options AM-PAC 6 Clicks Daily Activity (OT);Optimal Instrument  -PG       Row Name 04/24/24 1038          Optimal Instrument    Optimal Instrument Optimal - 3  -PG     Bending/Stooping 4  -PG     Standing 4  -PG     Reaching 2  -PG               User Key  (r) = Recorded By, (t) = Taken By, (c) = Cosigned By      Initials Name Provider Type    PG Kodak Matos OT Occupational Therapist                  Section G              Section GG  SectionGG: Functional Ability/Goals, Adm  Self Care, Prior Functioning (DO0262B): 1. Dependent  Upper Extremity Range of Motion (MJ4500B): No impairment  Self Care, Admission (Section GG)  Eating: Self-Care Admission Performance (GU2170V6): independent (06)  Oral Hygiene: Self-Care Admission Performance (GV8308D2): independent (06)  Toileting Hygiene: Self-Care Admission Performance (NG1617K6): dependent (01)  Shower/Bathe Self: Self-Care Admission Performance (GW8304O6): substantial/maximal assistance (02)  Upper Body Dressing: Self-Care Admission Performance (TZ8454J4): setup or clean-up assistance (05)  Lower Body Dressing: Self-Care Admission Performance (CO5461U0): dependent (01)  Putting On/Taking Off Footwear: Self-Care Admission Performance (EX4180L3): dependent (01)  Personal Hygiene: Self-Care Admission Performance (DN1724D8): dependent (01)  Mobility, Admission Performance (NS9311)  Toilet Transfer: Mobility Admission Performance (DU0158Q2): not attempted, medical condition/safety concern (88)  Section GG: Functional Ability/Goals, DC  Eating: Self-Care Discharge Goal  (ZH4020A8): independent (06)  Oral Hygiene: Self-Care Discharge Goal (FW7029Q7): independent (06)  Toileting Hygiene: Self-Care Discharge Goal (IP7382V2): partial/moderate assistance (03)  Shower/Bathe Self: Self-Care Discharge Goal (HQ5240U1): partial/moderate assistance (03)  Upper Body Dressing: Self-Care Discharge Goal (WV3807Q5): independent (06)  Lower Body Dressing: Self-Care Discharge Goal (WB6758Z7): partial/moderate assistance (03)  Putting On/Taking Off Footwear: Self-Care Discharge Goal (SQ3470O7): partial/moderate assistance (03)  Personal Hygiene: Self-Care Discharge Goal (VO1034H5): partial/moderate assistance (03)  Mobility (GG), Discharge Goal (PU5658)  Toilet Transfer: Mobility Discharge Goal (FZ2595H0): supervision or touching assistance (04)          Occupational Therapy Education       Title: PT OT SLP Therapies (Done)       Topic: Occupational Therapy (Done)       Point: ADL training (Done)       Description:   Instruct learner(s) on proper safety adaptation and remediation techniques during self care or transfers.   Instruct in proper use of assistive devices.                  Learning Progress Summary             Patient Acceptance, E,D, DU by PG at 4/24/2024 1039    Acceptance, E,TB, VU by RM at 2/14/2024 0518    Acceptance, E,TB, VU by RM at 1/9/2024 0420    Acceptance, E, VU by CR at 12/30/2023 1750    Acceptance, E,TB, VU by RM at 11/30/2023 0420    Acceptance, E,D, DU by PG at 11/7/2023 0932                         Point: Home exercise program (Done)       Description:   Instruct learner(s) on appropriate technique for monitoring, assisting and/or progressing therapeutic exercises/activities.                  Learning Progress Summary             Patient Acceptance, E,D, DU by PG at 4/24/2024 1039    Acceptance, E,TB, VU by RM at 2/14/2024 0518    Acceptance, E,TB, VU by RM at 1/9/2024 0420    Acceptance, E, VU by CR at 12/30/2023 1750    Acceptance, E,TB, VU by RM at 11/30/2023 0420     Acceptance, E,D, DU by PG at 11/7/2023 0932                         Point: Precautions (Done)       Description:   Instruct learner(s) on prescribed precautions during self-care and functional transfers.                  Learning Progress Summary             Patient Acceptance, E,D, DU by PG at 4/24/2024 1039    Acceptance, E,TB, VU by RM at 2/14/2024 0518    Acceptance, E,TB, VU by RM at 1/9/2024 0420    Acceptance, E, VU by CR at 12/30/2023 1750    Acceptance, E,TB, VU by RM at 11/30/2023 0420    Acceptance, E,D, DU by PG at 11/7/2023 0932                         Point: Body mechanics (Done)       Description:   Instruct learner(s) on proper positioning and spine alignment during self-care, functional mobility activities and/or exercises.                  Learning Progress Summary             Patient Acceptance, E,D, DU by PG at 4/24/2024 1039    Acceptance, E,TB, VU by RM at 2/14/2024 0518    Acceptance, E,TB, VU by RM at 1/9/2024 0420    Acceptance, E, VU by CR at 12/30/2023 1750    Acceptance, E,TB, VU by RM at 11/30/2023 0420    Acceptance, E,D, DU by PG at 11/7/2023 0932                                         User Key       Initials Effective Dates Name Provider Type Discipline     06/08/21 -  Bharath Breg, RN Registered Nurse Nurse    CR 06/13/22 -  Adilson Baker LPN Licensed Nurse Nurse    PG 06/16/21 -  Kodak Matos OT Occupational Therapist OT                  OT Recommendation and Plan  Planned Therapy Interventions (OT): activity tolerance training, BADL retraining, transfer/mobility retraining, patient/caregiver education/training, occupation/activity based interventions, strengthening exercise  Therapy Frequency (OT): 5 times/wk  Plan of Care Review  Plan of Care Reviewed With: patient  Progress: no change  Outcome Evaluation: Patient presents with limitations affecting strength, activity tolerance, and balance impacting patient's ability to return home safely and independently.  The skills of  a therapist will be required to safely and effectively implement the following treatment plan to restore maximal level of function     Time Calculation:   Evaluation Complexity (OT)  Review Occupational Profile/Medical/Therapy History Complexity: brief/low complexity  Assessment, Occupational Performance/Identification of Deficit Complexity: 3-5 performance deficits  Clinical Decision Making Complexity (OT): problem focused assessment/low complexity  Overall Complexity of Evaluation (OT): low complexity     Time Calculation- OT       Row Name 04/24/24 1041             Time Calculation- OT    OT Received On 04/24/24  -PG      OT Goal Re-Cert Due Date 05/03/24  -PG         Timed Charges    06016 - OT Therapeutic Activity Minutes 15  -PG         Untimed Charges    OT Eval/Re-eval Minutes 35  -PG         SNF Occupational Therapy Minutes    Skilled Minutes- OT 15 min  -PG         Total Minutes    Timed Charges Total Minutes 15  -PG      Untimed Charges Total Minutes 35  -PG       Total Minutes 50  -PG                User Key  (r) = Recorded By, (t) = Taken By, (c) = Cosigned By      Initials Name Provider Type    PG Kodak Matos OT Occupational Therapist                  Therapy Charges for Today       Code Description Service Date Service Provider Modifiers Qty    [id number]  OT THERAPEUTIC ACT EA 15 MIN 4/24/2024 Kodak Matos OT GO 1    [id number] HC OT EVAL LOW COMPLEXITY 3 4/24/2024 Kodak Matos OT GO 1                 Kodak Matos OT  4/24/2024

## 2024-04-24 NOTE — PLAN OF CARE
Goal Outcome Evaluation:  Plan of Care Reviewed With: patient        Progress: no change  Outcome Evaluation: Patient presents with limitations affecting strength, activity tolerance, and balance impacting patient's ability to return home safely and independently.  The skills of a therapist will be required to safely and effectively implement the following treatment plan to restore maximal level of function      Anticipated Discharge Disposition (OT): extended care facility

## 2024-04-24 NOTE — THERAPY EVALUATION
Acute Care - Physical Therapy Initial Evaluation  KE[facility] Dominguez     Patient Name: Jose Shaikh  : 1960  MRN: [id number]  Today's Date: 2024      Visit Dx:     ICD-10-CM ICD-9-CM   1. Difficulty in walking  R26.2 719.7   2. Type 2 diabetes mellitus with other specified complication, unspecified whether long term insulin use  E11.69 250.80     Patient Active Problem List   Diagnosis    Diabetic ulcer of left heel associated with type 2 DM    Acute osteomyelitis of left calcaneus     Diabetic ulcer of left heel associated with type 2 DM    Diabetic ulcer of right midfoot associated with type 2 DM    Paroxysmal atrial fibrillation    Essential hypertension    Hyperlipidemia LDL goal <100    Cellulitis and abscess of foot    High alkaline phosphatase level    Osteomyelitis    Onychomycosis    Onychocryptosis    Foot pain, bilateral    Osteomyelitis of foot, right, acute    Cellulitis of right foot    Type 2 diabetes mellitus, with long-term current use of insulin    Class 3 severe obesity due to excess calories with serious comorbidity and body mass index (BMI) of 45.0 to 49.9 in adult    Anxiety disorder, unspecified    Claustrophobia    Dependence on wheelchair    Depression, unspecified    Long term (current) use of anticoagulants    Long term (current) use of oral hypoglycemic drugs    Wound of foot    Ulcer of right foot    Orthostatic hypotension    Other chronic osteomyelitis, right ankle and foot    Personal history of nicotine dependence    Thrombocytopenia, unspecified    Unspecified open wound, right foot, initial encounter    Diabetic foot infection    Subacute osteomyelitis of right foot    Right foot pain    Sepsis    Onychomycosis    Foot pain, left    Impaired mobility and ADLs    Absence of toe of right foot    Corns and callosity    Disability of walking    Fracture    Limb swelling    Polyneuropathy    Pressure ulcer, stage 1    Shortness of breath    Generalized weakness    Debility     Ulcerated, foot, left, limited to breakdown of skin    Onychomycosis     Past Medical History:   Diagnosis Date    Absence of toe of right foot     Acute osteomyelitis of left calcaneus  08/18/2021    Anxiety and depression     Arthritis     Cancer     Chronic pain     STATES HIS PAIN IS 10/10 AAT    Claustrophobia     Corns and callus     Diabetic ulcer of left heel associated with type 2 DM 08/18/2021    Diabetic ulcer of left heel associated with type 2 DM 07/06/2021    Diabetic ulcer of right midfoot associated with type 2 DM 08/18/2021    Difficulty walking     Essential hypertension 08/31/2021    Hammertoe     Hyperlipidemia LDL goal <100 08/31/2021    Ingrown toenail     Obesity     Paroxysmal atrial fibrillation 08/31/2021    Polyneuropathy     Pressure ulcer, stage 1     Tinea unguium     Type 2 diabetes mellitus with polyneuropathy      Past Surgical History:   Procedure Laterality Date    CYST REMOVAL      center of back; benign    EYE SURGERY      INCISION AND DRAINAGE ABSCESS      back    INCISION AND DRAINAGE LEG Right 12/10/2021    Procedure: INCISION AND DRAINAGE LOWER EXTREMITY;  Surgeon: Ash Leyva DPM;  Location: [facility];  Service: Podiatry;  Laterality: Right;    OTHER SURGICAL HISTORY      Surgical clips left foot    TOE SURGERY Right     Removal of 5th toe    TRANS METATARSAL AMPUTATION Right 12/02/2021    Procedure: AMPUTATION TRANS METATARSAL;  Surgeon: Ash Leyva DPM;  Location: [facility] OR;  Service: Podiatry;  Laterality: Right;    VASCULAR SURGERY      WRIST SURGERY Left     repair of injury     PT Assessment (Last 12 Hours)       PT Evaluation and Treatment       Row Name 04/24/24 1100          Physical Therapy Time and Intention    Subjective Information no complaints  -MOE     Document Type evaluation;therapy note (daily note)  -MOE     Mode of Treatment individual therapy;physical therapy  -MOE     Patient Effort good  -MOE       Row Name 04/24/24 1100           General Information    Patient Observations alert;cooperative;agree to therapy  -MOE       Row Name 04/24/24 1100          Range of Motion (ROM)    Range of Motion ROM is WFL;bilateral lower extremities  -MOE       Row Name 04/24/24 1100          Strength (Manual Muscle Testing)    Strength (Manual Muscle Testing) bilateral lower extremities  4-/5 except B hip flexion 3+/5  -MOE       Row Name 04/24/24 1100          Bed Mobility    Bed Mobility bed mobility (all) activities;supine-sit  -MOE     All Activities, [place] (Bed Mobility) minimum assist (75% patient effort)  -MOE     Scooting/Bridging [place] (Bed Mobility) minimum assist (75% patient effort)  -MOE       Row Name 04/24/24 1100          Transfers    Transfers sit-stand transfer  -MOE       Row Name 04/24/24 1100          Sit-Stand Transfer    Sit-Stand [place] (Transfers) moderate assist (50% patient effort);2 person assist  -MOE     Assistive Device (Sit-Stand Transfers) walker, front-wheeled  -MOE       Row Name 04/24/24 1100          Stand-Sit Transfer    Stand-Sit [place] (Transfers) moderate assist (50% patient effort);2 person assist  -MOE     Assistive Device (Stand-Sit Transfers) walker, front-wheeled  -MOE       Row Name 04/24/24 1100          Safety Issues, Functional Mobility    Impairments Affecting Function (Mobility) balance;endurance/activity tolerance;pain;range of motion (ROM);strength  -MOE       Row Name 04/24/24 1100          Balance    Static Standing Balance moderate assist;2-person assist  -MOE     Position/Device Used, Standing Balance walker, front-wheeled  -MOE       Row Name             Wound 03/27/24 1045 Right anterior Skin Tear    Wound - Properties Group Placement Date: 03/27/24  -CR Placement Time: 1045  -CR Side: Right  -CR Orientation: anterior  -CR Primary Wound Type: Skin tear  -CR    Retired Wound - Properties Group Placement Date: 03/27/24  -CR Placement Time: 1045  -CR Side: Right  -CR Orientation:  anterior  -CR Primary Wound Type: Skin tear  -CR    Retired Wound - Properties Group Date first assessed: 03/27/24  -CR Time first assessed: 1045  -CR Side: Right  -CR Primary Wound Type: Skin tear  -CR      Row Name             Wound 03/28/24 0309 Other (See comments) medial coccyx MASD (Moisture associated skin damage)    Wound - Properties Group Placement Date: 03/28/24  -DS Placement Time: 0309  -DS Present on Original Admission: N  -DS Side: Other (See comments)  -DS, medial  Orientation: medial  -DS Location: coccyx  -DS Primary Wound Type: MASD  -DS    Retired Wound - Properties Group Placement Date: 03/28/24  -DS Placement Time: 0309  -DS Present on Original Admission: N  -DS Side: Other (See comments)  -DS, medial  Orientation: medial  -DS Location: coccyx  -DS Primary Wound Type: MASD  -DS    Retired Wound - Properties Group Date first assessed: 03/28/24  -DS Time first assessed: 0309  -DS Present on Original Admission: N  -DS Side: Other (See comments)  -DS, medial  Location: coccyx  -DS Primary Wound Type: MASD  -DS      Row Name             Wound 03/28/24 1225 Right medial ankle Traumatic    Wound - Properties Group Placement Date: 03/28/24  -FP Placement Time: 1225  -FP Side: Right  -FP Orientation: medial  -FP Location: ankle  -FP Primary Wound Type: Traumatic  -FP    Retired Wound - Properties Group Placement Date: 03/28/24  -FP Placement Time: 1225  -FP Side: Right  -FP Orientation: medial  -FP Location: ankle  -FP Primary Wound Type: Traumatic  -FP    Retired Wound - Properties Group Date first assessed: 03/28/24  -FP Time first assessed: 1225  -FP Side: Right  -FP Location: ankle  -FP Primary Wound Type: Traumatic  -FP      Row Name             Wound 04/03/24 1730 Right anterior hip MASD (Moisture associated skin damage)    Wound - Properties Group Placement Date: 04/03/24  -GA Placement Time: 1730  -GA Side: Right  -GA Orientation: anterior  -GA Location: hip  -GA Primary Wound Type: MASD  -GA     Retired Wound - Properties Group Placement Date: 04/03/24  -IL Placement Time: 1730  -IL Side: Right  -IL Orientation: anterior  -IL Location: hip  -IL Primary Wound Type: MASD  -IL    Retired Wound - Properties Group Date first assessed: 04/03/24  -IL Time first assessed: 1730  -IL Side: Right  -IL Location: hip  -IL Primary Wound Type: MASD  -IL      Row Name 04/24/24 1100          Plan of Care Review    Plan of Care Reviewed With patient  -MOE     Outcome Evaluation Patient presents with decreased strength, transfers and ambulation.  Skilled physical therapy services will be required to address these mobility deficits.  -MOE       Row Name 04/24/24 1100          Therapy Assessment/Plan (PT)    Rehab Potential (PT) fair, will monitor progress closely  -MOE     Criteria for Skilled Interventions Met (PT) skilled treatment is necessary  -MOE     Therapy Frequency (PT) 6 times/wk  -MOE     Predicted Duration of Therapy Intervention (PT) 10 days  -MOE     Problem List (PT) problems related to;balance;mobility;strength;pain  -MOE     Activity Limitations Related to Problem List (PT) unable to ambulate safely;unable to transfer safely  -MOE       Row Name 04/24/24 1100          PT Evaluation Complexity    History, PT Evaluation Complexity no personal factors and/or comorbidities  -MOE     Examination of Body Systems (PT Eval Complexity) total of 4 or more elements  -MOE     Clinical Presentation (PT Evaluation Complexity) stable  -MOE     Clinical Decision Making (PT Evaluation Complexity) low complexity  -MOE     Overall Complexity (PT Evaluation Complexity) low complexity  -MOE       Row Name 04/24/24 1100          Therapy Plan Review/Discharge Plan (PT)    Therapy Plan Review (PT) evaluation/treatment results reviewed;participants voiced agreement with care plan;participants included;patient  -MOE       Row Name 04/24/24 1100          Physical Therapy Goals    Transfer Goal Selection (PT) transfer, PT goal 1  -MOE     Gait Training  Goal Selection (PT) gait training, PT goal 1  -MOE       Row Name 04/24/24 1100          Transfer Goal 1 (PT)    Activity/Assistive Device (Transfer Goal 1, PT) transfers, all  -MOE     [place] Level/Cues Needed (Transfer Goal 1, PT) contact guard required  -MOE     Time Frame (Transfer Goal 1, PT) long term goal (LTG);10 days  -MOE       Row Name 04/24/24 1100          Gait Training Goal 1 (PT)    Activity/Assistive Device (Gait Training Goal 1, PT) gait (walking locomotion);assistive device use;walker, rolling  -MOE     [place] Level (Gait Training Goal 1, PT) contact guard required  -MOE     Distance (Gait Training Goal 1, PT) 10  -MOE     Time Frame (Gait Training Goal 1, PT) long term goal (LTG);10 days  -MOE               User Key  (r) = Recorded By, (t) = Taken By, (c) = Cosigned By      Initials Name Provider Type    DS Linda Damico, RN Registered Nurse    Bettye Anand RN Registered Nurse    Nicole Elise RN Registered Nurse    Adilson Carmona LPN Licensed Nurse    Merlin De La O, PT Physical Therapist                    Physical Therapy Education       Title: PT OT SLP Therapies (Done)       Topic: Physical Therapy (Done)       Point: Mobility training (Done)       Learning Progress Summary             Patient Acceptance, E,D, DU by PG at 4/24/2024 1039    Acceptance, E,TB, VU by RM at 2/14/2024 0518    Acceptance, E,TB, VU by RM at 1/9/2024 0420    Acceptance, E, VU by CR at 12/30/2023 1750    Acceptance, E, VU by CR at 12/20/2023 1345    Acceptance, E, VU by CR at 12/19/2023 1004    Acceptance, E,TB, VU by RM at 11/30/2023 0420    Acceptance, E,TB, VU by MOE at 11/8/2023 1341                         Point: Precautions (Done)       Learning Progress Summary             Patient Acceptance, E,D, DU by PG at 4/24/2024 1039    Acceptance, E,TB, VU by RM at 2/14/2024 0518    Acceptance, E,TB, VU by RM at 1/9/2024 0420    Acceptance, E, VU by CR at 12/30/2023 1750    Acceptance, E,TB,  VU by  at 11/30/2023 0420    Acceptance, E,TB, VU by MOE at 11/8/2023 1341                                         User Key       Initials Effective Dates Name Provider Type Discipline     06/08/21 -  Bharath Berg, RN Registered Nurse Nurse    CR 06/13/22 -  Adilson Baker LPN Licensed Nurse Nurse    PG 06/16/21 -  Kodak Matos OT Occupational Therapist OT    MOE 06/03/21 -  Merlin Rao, PT Physical Therapist PT                  PT Recommendation and Plan  Anticipated Discharge Disposition (PT): extended care facility  Planned Therapy Interventions (PT): balance training, bed mobility training, gait training, home exercise program, stair training, strengthening, transfer training  Therapy Frequency (PT): 6 times/wk  Progress Summary (PT)  Progress Toward Functional Goals (PT): unable to show any progress toward functional goals  Daily Progress Summary (PT): Pt has made little to no progress toward his goals.  He is still very weak and will require skilled PT services to address his mobility issues but he will need to show more effort and progress druing this reevaluation period to continue to be appropriate.  Will continue current plan another 30 day period.  Pt was instructed that if no progress is made that we will have no choice but to d/c services due to lack of progress.  Plan of Care Reviewed With: patient  Outcome Evaluation: Patient presents with decreased strength, transfers and ambulation.  Skilled physical therapy services will be required to address these mobility deficits.   Outcome Measures       Row Name 04/24/24 1200             How much help from another person do you currently need...    Turning from your back to your side while in flat bed without using bedrails? 3  -MOE      Moving from lying on back to sitting on the side of a flat bed without bedrails? 3  -MOE      Moving to and from a bed to a chair (including a wheelchair)? 1  -MOE      Standing up from a chair using your arms (e.g.,  wheelchair, bedside chair)? 2  -MOE      Climbing 3-5 steps with a railing? 1  -MOE      To walk in hospital room? 2  -MOE      AM-PAC 6 Clicks Score (PT) 12  -MOE      Highest Level of Mobility Goal 4 --> Transfer to chair/commode  -MOE         Functional Assessment    Outcome Measure Options AM-PAC 6 Clicks Basic Mobility (PT)  -MOE                User Key  (r) = Recorded By, (t) = Taken By, (c) = Cosigned By      Initials Name Provider Type    Merlin De La O PT Physical Therapist                     Time Calculation:    PT Charges       Row Name 04/24/24 1148             Time Calculation    PT Received On 04/24/24  -MOE      PT Goal Re-Cert Due Date 05/04/24  -MOE         Timed Charges    97779 - PT Therapeutic Activity Minutes 10  -MOE         Untimed Charges    PT Eval/Re-eval Minutes 30  -MOE         Total Minutes    Timed Charges Total Minutes 10  -MOE      Untimed Charges Total Minutes 30  -MOE       Total Minutes 40  -MOE                User Key  (r) = Recorded By, (t) = Taken By, (c) = Cosigned By      Initials Name Provider Type    Merlin De La O, PT Physical Therapist                  Therapy Charges for Today       Code Description Service Date Service Provider Modifiers Qty    77599963137 HC PT THERAPEUTIC ACT EA 15 MIN 4/24/2024 Merlin Rao, PT GP 1    18258281357 HC PT EVAL LOW COMPLEXITY 3 4/24/2024 Merlin Rao, PT GP 1            PT G-Codes  Outcome Measure Options: AM-PAC 6 Clicks Basic Mobility (PT)  AM-PAC 6 Clicks Score (PT): 12  AM-PAC 6 Clicks Score (OT): 16    Merlin Rao PT  4/24/2024

## 2024-04-25 LAB
GLUCOSE BLDC GLUCOMTR-MCNC: 118 MG/DL (ref 70–99)
GLUCOSE BLDC GLUCOMTR-MCNC: 121 MG/DL (ref 70–99)
GLUCOSE BLDC GLUCOMTR-MCNC: 144 MG/DL (ref 70–99)
GLUCOSE BLDC GLUCOMTR-MCNC: 146 MG/DL (ref 70–99)
GLUCOSE BLDC GLUCOMTR-MCNC: 197 MG/DL (ref 70–99)

## 2024-04-25 PROCEDURE — 63710000001 INSULIN DETEMIR PER 5 UNITS: Performed by: PHYSICIAN ASSISTANT

## 2024-04-25 PROCEDURE — 97530 THERAPEUTIC ACTIVITIES: CPT

## 2024-04-25 PROCEDURE — 97116 GAIT TRAINING THERAPY: CPT

## 2024-04-25 PROCEDURE — 82948 REAGENT STRIP/BLOOD GLUCOSE: CPT | Performed by: INTERNAL MEDICINE

## 2024-04-25 PROCEDURE — 82948 REAGENT STRIP/BLOOD GLUCOSE: CPT

## 2024-04-25 RX ADMIN — LISINOPRIL 2.5 MG: 2.5 TABLET ORAL at 08:02

## 2024-04-25 RX ADMIN — BACLOFEN 10 MG: 10 TABLET ORAL at 01:59

## 2024-04-25 RX ADMIN — OXYCODONE AND ACETAMINOPHEN 1 TABLET: 10; 325 TABLET ORAL at 01:59

## 2024-04-25 RX ADMIN — ACETAMINOPHEN 650 MG: 325 TABLET ORAL at 21:32

## 2024-04-25 RX ADMIN — DICLOFENAC SODIUM 4 G: 10 GEL TOPICAL at 21:34

## 2024-04-25 RX ADMIN — CYANOCOBALAMIN TAB 500 MCG 1000 MCG: 500 TAB at 08:02

## 2024-04-25 RX ADMIN — SERTRALINE HYDROCHLORIDE 50 MG: 50 TABLET ORAL at 21:32

## 2024-04-25 RX ADMIN — OXYCODONE AND ACETAMINOPHEN 1 TABLET: 10; 325 TABLET ORAL at 09:59

## 2024-04-25 RX ADMIN — BACLOFEN 10 MG: 10 TABLET ORAL at 19:24

## 2024-04-25 RX ADMIN — ACETAMINOPHEN 650 MG: 325 TABLET ORAL at 05:17

## 2024-04-25 RX ADMIN — Medication 500 MG: at 08:02

## 2024-04-25 RX ADMIN — DICLOFENAC SODIUM 4 G: 10 GEL TOPICAL at 12:36

## 2024-04-25 RX ADMIN — OXYCODONE AND ACETAMINOPHEN 1 TABLET: 10; 325 TABLET ORAL at 19:24

## 2024-04-25 RX ADMIN — ATORVASTATIN CALCIUM 10 MG: 10 TABLET, FILM COATED ORAL at 21:32

## 2024-04-25 RX ADMIN — INSULIN DETEMIR 40 UNITS: 100 INJECTION, SOLUTION SUBCUTANEOUS at 08:01

## 2024-04-25 RX ADMIN — BACLOFEN 10 MG: 10 TABLET ORAL at 10:03

## 2024-04-25 RX ADMIN — FERROUS SULFATE TAB 325 MG (65 MG ELEMENTAL FE) 325 MG: 325 (65 FE) TAB at 08:02

## 2024-04-25 RX ADMIN — EMPAGLIFLOZIN 10 MG: 10 TABLET, FILM COATED ORAL at 08:02

## 2024-04-25 RX ADMIN — PREGABALIN 100 MG: 100 CAPSULE ORAL at 16:39

## 2024-04-25 RX ADMIN — FUROSEMIDE 40 MG: 40 TABLET ORAL at 08:02

## 2024-04-25 RX ADMIN — DICLOFENAC SODIUM 4 G: 10 GEL TOPICAL at 18:19

## 2024-04-25 RX ADMIN — Medication 10 MG: at 21:32

## 2024-04-25 RX ADMIN — APIXABAN 5 MG: 5 TABLET, FILM COATED ORAL at 08:02

## 2024-04-25 RX ADMIN — APIXABAN 5 MG: 5 TABLET, FILM COATED ORAL at 21:32

## 2024-04-25 RX ADMIN — PREGABALIN 100 MG: 100 CAPSULE ORAL at 21:32

## 2024-04-25 RX ADMIN — DICLOFENAC SODIUM 4 G: 10 GEL TOPICAL at 08:01

## 2024-04-25 RX ADMIN — Medication 500 MG: at 21:32

## 2024-04-25 RX ADMIN — INSULIN DETEMIR 40 UNITS: 100 INJECTION, SOLUTION SUBCUTANEOUS at 21:32

## 2024-04-25 RX ADMIN — PREGABALIN 100 MG: 100 CAPSULE ORAL at 08:02

## 2024-04-25 NOTE — THERAPY EVALUATION
Acute Care - Physical Therapy Initial Evaluation  [facility] Dominguez     Patient Name: Jose Shaikh  : 1960  MRN: [id number]  Today's Date: 2024      Admit date: 2023     Referring Physician: Jose Maya MD     Surgery Date:* No surgery found *            Visit Dx:     ICD-10-CM ICD-9-CM   1. Difficulty in walking  R26.2 719.7   2. Type 2 diabetes mellitus with other specified complication, unspecified whether long term insulin use  E11.69 250.80     Patient Active Problem List   Diagnosis    Diabetic ulcer of left heel associated with type 2 DM    Acute osteomyelitis of left calcaneus     Diabetic ulcer of left heel associated with type 2 DM    Diabetic ulcer of right midfoot associated with type 2 DM    Paroxysmal atrial fibrillation    Essential hypertension    Hyperlipidemia LDL goal <100    Cellulitis and abscess of foot    High alkaline phosphatase level    Osteomyelitis    Onychomycosis    Onychocryptosis    Foot pain, bilateral    Osteomyelitis of foot, right, acute    Cellulitis of right foot    Type 2 diabetes mellitus, with long-term current use of insulin    Class 3 severe obesity due to excess calories with serious comorbidity and body mass index (BMI) of 45.0 to 49.9 in adult    Anxiety disorder, unspecified    Claustrophobia    Dependence on wheelchair    Depression, unspecified    Long term (current) use of anticoagulants    Long term (current) use of oral hypoglycemic drugs    Wound of foot    Ulcer of right foot    Orthostatic hypotension    Other chronic osteomyelitis, right ankle and foot    Personal history of nicotine dependence    Thrombocytopenia, unspecified    Unspecified open wound, right foot, initial encounter    Diabetic foot infection    Subacute osteomyelitis of right foot    Right foot pain    Sepsis    Onychomycosis    Foot pain, left    Impaired mobility and ADLs    Absence of toe of right foot    Corns and callosity    Disability of walking    Fracture    Limb  swelling    Polyneuropathy    Pressure ulcer, stage 1    Shortness of breath    Generalized weakness    Debility    Ulcerated, foot, left, limited to breakdown of skin    Onychomycosis     Past Medical History:   Diagnosis Date    Absence of toe of right foot     Acute osteomyelitis of left calcaneus  08/18/2021    Anxiety and depression     Arthritis     Cancer     Chronic pain     STATES HIS PAIN IS 10/10 AAT    Claustrophobia     Corns and callus     Diabetic ulcer of left heel associated with type 2 DM 08/18/2021    Diabetic ulcer of left heel associated with type 2 DM 07/06/2021    Diabetic ulcer of right midfoot associated with type 2 DM 08/18/2021    Difficulty walking     Essential hypertension 08/31/2021    Hammertoe     Hyperlipidemia LDL goal <100 08/31/2021    Ingrown toenail     Obesity     Paroxysmal atrial fibrillation 08/31/2021    Polyneuropathy     Pressure ulcer, stage 1     Tinea unguium     Type 2 diabetes mellitus with polyneuropathy      Past Surgical History:   Procedure Laterality Date    CYST REMOVAL      center of back; benign    EYE SURGERY      INCISION AND DRAINAGE ABSCESS      back    INCISION AND DRAINAGE LEG Right 12/10/2021    Procedure: INCISION AND DRAINAGE LOWER EXTREMITY;  Surgeon: Ash Leyva DPM;  Location: [facility] (formerly [facility]3];  Service: Podiatry;  Laterality: Right;    OTHER SURGICAL HISTORY      Surgical clips left foot    TOE SURGERY Right     Removal of 5th toe    TRANS METATARSAL AMPUTATION Right 12/02/2021    Procedure: AMPUTATION TRANS METATARSAL;  Surgeon: Ash Leyva DPM;  Location: [facility] (formerly [facility]3];  Service: Podiatry;  Laterality: Right;    VASCULAR SURGERY      WRIST SURGERY Left     repair of injury     PT Assessment (Last 12 Hours)       PT Evaluation and Treatment       Row Name 04/25/24 1300          Physical Therapy Time and Intention    Subjective Information no complaints  -MOE     Document Type therapy note (daily note)  -MOE     Mode of  Treatment individual therapy;physical therapy  -MOE     Patient Effort good  -MOE       Row Name 04/25/24 1300          General Information    Patient Observations alert;cooperative;agree to therapy  -MOE       Row Name 04/25/24 1300          Bed Mobility    Bed Mobility bed mobility (all) activities;supine-sit  -MOE     All Activities, [place] (Bed Mobility) minimum assist (75% patient effort)  -MOE     Supine-Sit-Supine [place] (Bed Mobility) minimum assist (75% patient effort)  -MOE       Row Name 04/25/24 1300          Bed-Chair Transfer    Bed-Chair [place] (Transfers) moderate assist (50% patient effort);verbal cues;2 person assist;1 person to manage equipment  -MOE     Assistive Device (Bed-Chair Transfers) bariatric;walker, front-wheeled  -MOE       Row Name 04/25/24 1300          Sit-Stand Transfer    Sit-Stand [place] (Transfers) moderate assist (50% patient effort);2 person assist  -MOE     Assistive Device (Sit-Stand Transfers) walker, front-wheeled;bariatric  -MOE     Comment, (Sit-Stand Transfer) Pt performed this transfer 3x each with more success.  He was able to stand for a max of 1 min with several LOB posteriorly which was corrected with heavy assistance.  -MOE       Row Name 04/25/24 1300          Gait/Stairs (Locomotion)    Gait/Stairs Locomotion gait/ambulation assistive device  -MOE     Webster Level (Gait) moderate assist (50% patient effort);2 person assist;1 person to manage equipment  -MOE     Assistive Device (Gait) walker, front-wheeled  -MOE     Distance in Feet (Gait) 2  Pt ambulated 5 steps with 2 person assist requiring LLE advancement and manual cueing for GEOFF.  Pt has a very narrow GEOFF if not cued and loses balance posteriorly easily.  -MOE       Row Name 04/25/24 1300          Safety Issues, Functional Mobility    Impairments Affecting Function (Mobility) balance;endurance/activity tolerance;pain;range of motion (ROM);strength  -MOE       Row Name 04/25/24 1300           Balance    Dynamic Standing Balance moderate assist;2-person assist  -MOE     Position/Device Used, Standing Balance walker, front-wheeled  -MOE       Row Name             Wound 03/27/24 1045 Right anterior Skin Tear    Wound - Properties Group Placement Date: 03/27/24  -CR Placement Time: 1045  -CR Side: Right  -CR Orientation: anterior  -CR Primary Wound Type: Skin tear  -CR    Retired Wound - Properties Group Placement Date: 03/27/24  -CR Placement Time: 1045  -CR Side: Right  -CR Orientation: anterior  -CR Primary Wound Type: Skin tear  -CR    Retired Wound - Properties Group Date first assessed: 03/27/24  -CR Time first assessed: 1045  -CR Side: Right  -CR Primary Wound Type: Skin tear  -CR      Row Name             Wound 03/28/24 0309 Other (See comments) medial coccyx MASD (Moisture associated skin damage)    Wound - Properties Group Placement Date: 03/28/24  -DS Placement Time: 0309  -DS Present on Original Admission: N  -DS Side: Other (See comments)  -DS, medial  Orientation: medial  -DS Location: coccyx  -DS Primary Wound Type: MASD  -DS    Retired Wound - Properties Group Placement Date: 03/28/24  -DS Placement Time: 0309  -DS Present on Original Admission: N  -DS Side: Other (See comments)  -DS, medial  Orientation: medial  -DS Location: coccyx  -DS Primary Wound Type: MASD  -DS    Retired Wound - Properties Group Date first assessed: 03/28/24  -DS Time first assessed: 0309  -DS Present on Original Admission: N  -DS Side: Other (See comments)  -DS, medial  Location: coccyx  -DS Primary Wound Type: MASD  -DS      Row Name             Wound 03/28/24 1225 Right medial ankle Traumatic    Wound - Properties Group Placement Date: 03/28/24  -FP Placement Time: 1225  -FP Side: Right  -FP Orientation: medial  -FP Location: ankle  -FP Primary Wound Type: Traumatic  -FP    Retired Wound - Properties Group Placement Date: 03/28/24  -FP Placement Time: 1225  -FP Side: Right  -FP Orientation: medial  -FP Location:  ankle  -FP Primary Wound Type: Traumatic  -FP    Retired Wound - Properties Group Date first assessed: 03/28/24  -FP Time first assessed: 1225  -FP Side: Right  -FP Location: ankle  -FP Primary Wound Type: Traumatic  -FP      Row Name             Wound 04/03/24 1730 Right anterior hip MASD (Moisture associated skin damage)    Wound - Properties Group Placement Date: 04/03/24  -FL Placement Time: 1730  -FL Side: Right  -FL Orientation: anterior  -FL Location: hip  -FL Primary Wound Type: MASD  -FL    Retired Wound - Properties Group Placement Date: 04/03/24  -FL Placement Time: 1730  -FL Side: Right  -FL Orientation: anterior  -FL Location: hip  -FL Primary Wound Type: MASD  -FL    Retired Wound - Properties Group Date first assessed: 04/03/24  -FL Time first assessed: 1730  -FL Side: Right  -FL Location: hip  -FL Primary Wound Type: MASD  -FL      Row Name 04/25/24 1300          Plan of Care Review    Plan of Care Reviewed With patient  -MOE     Progress improving  -MOE     Outcome Evaluation Pt was able to ambulate today with heavy encouragment.  He is being limited by his fear of falling but with prompting pt can ambulate a few feet at a time.  Will attempt ambulating with chair follow tomorrow.  -MOE       Row Name 04/25/24 1300          Progress Summary (PT)    Progress Toward Functional Goals (PT) progress toward functional goals as expected  -MOE               User Key  (r) = Recorded By, (t) = Taken By, (c) = Cosigned By      Initials Name Provider Type    DS Linda Damico, RN Registered Nurse    Bettye Anand RN Registered Nurse    Nicole Elise RN Registered Nurse    Adilson Carmona LPN Licensed Nurse    Merlin De La O, PT Physical Therapist                    Physical Therapy Education       Title: PT OT SLP Therapies (Done)       Topic: Physical Therapy (Done)       Point: Mobility training (Done)       Learning Progress Summary             Patient Acceptance, E,D, DU by PG at 4/24/2024  1039    Acceptance, E,TB, VU by RM at 2/14/2024 0518    Acceptance, E,TB, VU by RM at 1/9/2024 0420    Acceptance, E, VU by CR at 12/30/2023 1750    Acceptance, E, VU by CR at 12/20/2023 1345    Acceptance, E, VU by CR at 12/19/2023 1004    Acceptance, E,TB, VU by RM at 11/30/2023 0420    Acceptance, E,TB, VU by MOE at 11/8/2023 1341                         Point: Precautions (Done)       Learning Progress Summary             Patient Acceptance, E,D, DU by PG at 4/24/2024 1039    Acceptance, E,TB, VU by RM at 2/14/2024 0518    Acceptance, E,TB, VU by RM at 1/9/2024 0420    Acceptance, E, VU by CR at 12/30/2023 1750    Acceptance, E,TB, VU by RM at 11/30/2023 0420    Acceptance, E,TB, VU by MOE at 11/8/2023 1341                                         User Key       Initials Effective Dates Name Provider Type Discipline     06/08/21 -  Bharath Berg, RN Registered Nurse Nurse    CR 06/13/22 -  Adilson Baker LPN Licensed Nurse Nurse    PG 06/16/21 -  Kodak Matos OT Occupational Therapist OT    MOE 06/03/21 -  Merlin Rao PT Physical Therapist PT                  PT Recommendation and Plan  Anticipated Discharge Disposition (PT): extended care facility  Planned Therapy Interventions (PT): balance training, bed mobility training, gait training, home exercise program, stair training, strengthening, transfer training  Therapy Frequency (PT): 6 times/wk  Progress Summary (PT)  Progress Toward Functional Goals (PT): progress toward functional goals as expected  Daily Progress Summary (PT): Pt has made little to no progress toward his goals.  He is still very weak and will require skilled PT services to address his mobility issues but he will need to show more effort and progress druing this reevaluation period to continue to be appropriate.  Will continue current plan another 30 day period.  Pt was instructed that if no progress is made that we will have no choice but to d/c services due to lack of progress.  Plan  of Care Reviewed With: patient  Progress: improving  Outcome Evaluation: Pt was able to ambulate today with heavy encouragment.  He is being limited by his fear of falling but with prompting pt can ambulate a few feet at a time.  Will attempt ambulating with chair follow tomorrow.   Outcome Measures       Row Name 04/24/24 1200             How much help from another person do you currently need...    Turning from your back to your side while in flat bed without using bedrails? 3  -MOE      Moving from lying on back to sitting on the side of a flat bed without bedrails? 3  -MOE      Moving to and from a bed to a chair (including a wheelchair)? 1  -MOE      Standing up from a chair using your arms (e.g., wheelchair, bedside chair)? 2  -MOE      Climbing 3-5 steps with a railing? 1  -MOE      To walk in hospital room? 2  -MOE      AM-PAC 6 Clicks Score (PT) 12  -MOE      Highest Level of Mobility Goal 4 --> Transfer to chair/commode  -MOE         Functional Assessment    Outcome Measure Options AM-PAC 6 Clicks Basic Mobility (PT)  -MOE                User Key  (r) = Recorded By, (t) = Taken By, (c) = Cosigned By      Initials Name Provider Type    Merlin De La O PT Physical Therapist                     Time Calculation:    PT Charges       Row Name 04/25/24 1319             Time Calculation    PT Received On 04/25/24  -MOE         Timed Charges    71244 - Gait Training Minutes  5  -MOE      47025 - PT Therapeutic Activity Minutes 20  -MOE         Total Minutes    Timed Charges Total Minutes 25  -MOE       Total Minutes 25  -MOE                User Key  (r) = Recorded By, (t) = Taken By, (c) = Cosigned By      Initials Name Provider Type    Merlin De La O PT Physical Therapist                  Therapy Charges for Today       Code Description Service Date Service Provider Modifiers Qty    28651752856 HC PT THERAPEUTIC ACT EA 15 MIN 4/24/2024 Merlin Rao PT GP 1    56575635026 HC PT EVAL LOW COMPLEXITY 3 4/24/2024  Merlin Rao, PT GP 1    51558546538 HC PT THERAPEUTIC ACT EA 15 MIN 4/25/2024 Merlin Rao, PT GP 1            PT G-Codes  Outcome Measure Options: AM-PAC 6 Clicks Daily Activity (OT), Optimal Instrument  AM-PAC 6 Clicks Score (PT): 12  AM-PAC 6 Clicks Score (OT): 16    Merlin Rao, PT  4/25/2024

## 2024-04-25 NOTE — PLAN OF CARE
Goal Outcome Evaluation:  Plan of Care Reviewed With: patient        Progress: improving  Outcome Evaluation: Resident alert, oriented, able to make needs known to staff. Participated in therapy for his 2nd day. Able to stand, pivot and turn and sit ap in recliner for several hours. was able to return to bed with floorstaff x3. Blood glucose stable for all 3 meals. Medicated with prn Baclofen and Percocet at 10:03, effective. Resident pleasant and cooperative.

## 2024-04-25 NOTE — PLAN OF CARE
Goal Outcome Evaluation:           Progress: no change  Outcome Evaluation: Patient is alert and oriented x4, able to make needs known to staff.  Has been medicated x1 this shift with prn oxycodone, baclofen, and tylenol - see emar.  Rsd. states medication effective.  Refuses to use bedpan this shift and has been incontinent of bowels.  Urinal remains at bedside.  Activity encouraged this shift, Rsd. has attempted to pull self up in bed with overhead trapeze bar.  Has not ambulated or gotten out of bed this shift, Refuses to be turned, but does shift weight frequently while in bed.  Refuses bed alert.  Bowels formed this shift.  Call light and personal items within reach, Rsd. reminded to use call light for assistance, verbalizes understanding.  WIll continue to monitor and notify on-coming staff.  Current plan of care remains in place at this time.

## 2024-04-26 LAB
GLUCOSE BLDC GLUCOMTR-MCNC: 119 MG/DL (ref 70–99)
GLUCOSE BLDC GLUCOMTR-MCNC: 137 MG/DL (ref 70–99)
GLUCOSE BLDC GLUCOMTR-MCNC: 151 MG/DL (ref 70–99)
GLUCOSE BLDC GLUCOMTR-MCNC: 93 MG/DL (ref 70–99)

## 2024-04-26 PROCEDURE — 82948 REAGENT STRIP/BLOOD GLUCOSE: CPT

## 2024-04-26 PROCEDURE — 97110 THERAPEUTIC EXERCISES: CPT

## 2024-04-26 PROCEDURE — 82948 REAGENT STRIP/BLOOD GLUCOSE: CPT | Performed by: INTERNAL MEDICINE

## 2024-04-26 PROCEDURE — 63710000001 INSULIN LISPRO (HUMAN) PER 5 UNITS: Performed by: INTERNAL MEDICINE

## 2024-04-26 PROCEDURE — 63710000001 INSULIN DETEMIR PER 5 UNITS: Performed by: PHYSICIAN ASSISTANT

## 2024-04-26 PROCEDURE — 63710000001 ONDANSETRON ODT 4 MG TABLET DISPERSIBLE: Performed by: INTERNAL MEDICINE

## 2024-04-26 RX ADMIN — INSULIN DETEMIR 40 UNITS: 100 INJECTION, SOLUTION SUBCUTANEOUS at 21:05

## 2024-04-26 RX ADMIN — PREGABALIN 100 MG: 100 CAPSULE ORAL at 21:06

## 2024-04-26 RX ADMIN — APIXABAN 5 MG: 5 TABLET, FILM COATED ORAL at 08:30

## 2024-04-26 RX ADMIN — DICLOFENAC SODIUM 4 G: 10 GEL TOPICAL at 11:55

## 2024-04-26 RX ADMIN — BACLOFEN 10 MG: 10 TABLET ORAL at 03:34

## 2024-04-26 RX ADMIN — APIXABAN 5 MG: 5 TABLET, FILM COATED ORAL at 21:06

## 2024-04-26 RX ADMIN — OXYCODONE AND ACETAMINOPHEN 1 TABLET: 10; 325 TABLET ORAL at 12:40

## 2024-04-26 RX ADMIN — ATORVASTATIN CALCIUM 10 MG: 10 TABLET, FILM COATED ORAL at 21:06

## 2024-04-26 RX ADMIN — Medication 10 MG: at 21:18

## 2024-04-26 RX ADMIN — Medication 500 MG: at 21:06

## 2024-04-26 RX ADMIN — BACLOFEN 10 MG: 10 TABLET ORAL at 12:40

## 2024-04-26 RX ADMIN — ONDANSETRON 4 MG: 4 TABLET, ORALLY DISINTEGRATING ORAL at 21:22

## 2024-04-26 RX ADMIN — EMPAGLIFLOZIN 10 MG: 10 TABLET, FILM COATED ORAL at 08:31

## 2024-04-26 RX ADMIN — OXYCODONE AND ACETAMINOPHEN 1 TABLET: 10; 325 TABLET ORAL at 21:18

## 2024-04-26 RX ADMIN — INSULIN DETEMIR 40 UNITS: 100 INJECTION, SOLUTION SUBCUTANEOUS at 08:31

## 2024-04-26 RX ADMIN — INSULIN LISPRO 4 UNITS: 100 INJECTION, SOLUTION INTRAVENOUS; SUBCUTANEOUS at 21:05

## 2024-04-26 RX ADMIN — DICLOFENAC SODIUM 4 G: 10 GEL TOPICAL at 17:17

## 2024-04-26 RX ADMIN — DICLOFENAC SODIUM 4 G: 10 GEL TOPICAL at 21:09

## 2024-04-26 RX ADMIN — FUROSEMIDE 40 MG: 40 TABLET ORAL at 08:30

## 2024-04-26 RX ADMIN — SERTRALINE HYDROCHLORIDE 50 MG: 50 TABLET ORAL at 21:06

## 2024-04-26 RX ADMIN — BACLOFEN 10 MG: 10 TABLET ORAL at 21:18

## 2024-04-26 RX ADMIN — LISINOPRIL 2.5 MG: 2.5 TABLET ORAL at 08:31

## 2024-04-26 RX ADMIN — IBUPROFEN 400 MG: 400 TABLET ORAL at 05:35

## 2024-04-26 RX ADMIN — ACETAMINOPHEN 650 MG: 325 TABLET ORAL at 01:42

## 2024-04-26 RX ADMIN — CYANOCOBALAMIN TAB 500 MCG 1000 MCG: 500 TAB at 08:31

## 2024-04-26 RX ADMIN — Medication 500 MG: at 08:31

## 2024-04-26 RX ADMIN — DICLOFENAC SODIUM 4 G: 10 GEL TOPICAL at 08:30

## 2024-04-26 RX ADMIN — FERROUS SULFATE TAB 325 MG (65 MG ELEMENTAL FE) 325 MG: 325 (65 FE) TAB at 08:30

## 2024-04-26 RX ADMIN — PREGABALIN 100 MG: 100 CAPSULE ORAL at 08:30

## 2024-04-26 RX ADMIN — PREGABALIN 100 MG: 100 CAPSULE ORAL at 17:00

## 2024-04-26 RX ADMIN — OXYCODONE AND ACETAMINOPHEN 1 TABLET: 10; 325 TABLET ORAL at 03:34

## 2024-04-26 RX ADMIN — ACETAMINOPHEN 650 MG: 325 TABLET ORAL at 17:17

## 2024-04-26 NOTE — PLAN OF CARE
Goal Outcome Evaluation:  Plan of Care Reviewed With: patient        Progress: no change  Outcome Evaluation: Alert and oriented x4. Given PRN Baclofen and Percocet for left hip pain at 1240. Med effective. Given PRN Tylenol at 1717 for left hip pain. Med effective. Worsening blister on patient's right residual foot. Provider and wound notified. See new wound care orders. Patient is to have right foot splint on while out of bed. See orders for how and when to apply. Voices needs. Con't current POC.

## 2024-04-26 NOTE — THERAPY TREATMENT NOTE
SNF - Physical Therapy Treatment Note  KEO Dominguez     Patient Name: Jose Shaikh  : 1960  MRN: [id number]  Today's Date: 2024      Visit Dx:    ICD-10-CM ICD-9-CM   1. Difficulty in walking  R26.2 719.7   2. Type 2 diabetes mellitus with other specified complication, unspecified whether long term insulin use  E11.69 250.80     Patient Active Problem List   Diagnosis    Diabetic ulcer of left heel associated with type 2 DM    Acute osteomyelitis of left calcaneus     Diabetic ulcer of left heel associated with type 2 DM    Diabetic ulcer of right midfoot associated with type 2 DM    Paroxysmal atrial fibrillation    Essential hypertension    Hyperlipidemia LDL goal <100    Cellulitis and abscess of foot    High alkaline phosphatase level    Osteomyelitis    Onychomycosis    Onychocryptosis    Foot pain, bilateral    Osteomyelitis of foot, right, acute    Cellulitis of right foot    Type 2 diabetes mellitus, with long-term current use of insulin    Class 3 severe obesity due to excess calories with serious comorbidity and body mass index (BMI) of 45.0 to 49.9 in adult    Anxiety disorder, unspecified    Claustrophobia    Dependence on wheelchair    Depression, unspecified    Long term (current) use of anticoagulants    Long term (current) use of oral hypoglycemic drugs    Wound of foot    Ulcer of right foot    Orthostatic hypotension    Other chronic osteomyelitis, right ankle and foot    Personal history of nicotine dependence    Thrombocytopenia, unspecified    Unspecified open wound, right foot, initial encounter    Diabetic foot infection    Subacute osteomyelitis of right foot    Right foot pain    Sepsis    Onychomycosis    Foot pain, left    Impaired mobility and ADLs    Absence of toe of right foot    Corns and callosity    Disability of walking    Fracture    Limb swelling    Polyneuropathy    Pressure ulcer, stage 1    Shortness of breath    Generalized weakness    Debility    Ulcerated, foot,  left, limited to breakdown of skin    Onychomycosis     Past Medical History:   Diagnosis Date    Absence of toe of right foot     Acute osteomyelitis of left calcaneus  08/18/2021    Anxiety and depression     Arthritis     Cancer     Chronic pain     STATES HIS PAIN IS 10/10 AAT    Claustrophobia     Corns and callus     Diabetic ulcer of left heel associated with type 2 DM 08/18/2021    Diabetic ulcer of left heel associated with type 2 DM 07/06/2021    Diabetic ulcer of right midfoot associated with type 2 DM 08/18/2021    Difficulty walking     Essential hypertension 08/31/2021    Hammertoe     Hyperlipidemia LDL goal <100 08/31/2021    Ingrown toenail     Obesity     Paroxysmal atrial fibrillation 08/31/2021    Polyneuropathy     Pressure ulcer, stage 1     Tinea unguium     Type 2 diabetes mellitus with polyneuropathy      Past Surgical History:   Procedure Laterality Date    CYST REMOVAL      center of back; benign    EYE SURGERY      INCISION AND DRAINAGE ABSCESS      back    INCISION AND DRAINAGE LEG Right 12/10/2021    Procedure: INCISION AND DRAINAGE LOWER EXTREMITY;  Surgeon: Ash Leyva DPM;  Location: [facility];  Service: Podiatry;  Laterality: Right;    OTHER SURGICAL HISTORY      Surgical clips left foot    TOE SURGERY Right     Removal of 5th toe    TRANS METATARSAL AMPUTATION Right 12/02/2021    Procedure: AMPUTATION TRANS METATARSAL;  Surgeon: Ash Leyva DPM;  Location: [facility] OR;  Service: Podiatry;  Laterality: Right;    VASCULAR SURGERY      WRIST SURGERY Left     repair of injury       PT Assessment (Last 12 Hours)       PT Evaluation and Treatment       Row Name 04/26/24 1416          Physical Therapy Time and Intention    Document Type therapy note (daily note)  -WM     Mode of Treatment individual therapy;physical therapy  -WM     Patient Effort adequate  -WM       Row Name 04/26/24 1416          Hip (Therapeutic Exercise)    Hip AAROM (Therapeutic  Exercise) bilateral;aBduction;aDduction;supine;10 repetitions;2 sets  -     Hip Strengthening (Therapeutic Exercise) bilateral;heel slides;supine;20 repititions  Manual resistance  -WM       Row Name 04/26/24 1416          Knee (Therapeutic Exercise)    Knee AROM (Therapeutic Exercise) bilateral;SAQ (short arc quad);supine;20 repititions  -     Knee Strengthening (Therapeutic Exercise) bilateral;SLR (straight leg raise);supine;20 repititions  -       Row Name 04/26/24 1416          Ankle (Therapeutic Exercise)    Ankle AROM (Therapeutic Exercise) bilateral;dorsiflexion;plantarflexion;supine;20 repititions  -WM       Row Name             Wound 03/27/24 1045 Right anterior Skin Tear    Wound - Properties Group Placement Date: 03/27/24  -CR Placement Time: 1045  -CR Side: Right  -CR Orientation: anterior  -CR Primary Wound Type: Skin tear  -CR    Retired Wound - Properties Group Placement Date: 03/27/24  -CR Placement Time: 1045  -CR Side: Right  -CR Orientation: anterior  -CR Primary Wound Type: Skin tear  -CR    Retired Wound - Properties Group Date first assessed: 03/27/24  -CR Time first assessed: 1045  -CR Side: Right  -CR Primary Wound Type: Skin tear  -CR      Row Name             Wound 03/28/24 0309 Other (See comments) medial coccyx MASD (Moisture associated skin damage)    Wound - Properties Group Placement Date: 03/28/24  -DS Placement Time: 0309  -DS Present on Original Admission: N  -DS Side: Other (See comments)  -DS, medial  Orientation: medial  -DS Location: coccyx  -DS Primary Wound Type: MASD  -DS    Retired Wound - Properties Group Placement Date: 03/28/24  -DS Placement Time: 0309  -DS Present on Original Admission: N  -DS Side: Other (See comments)  -DS, medial  Orientation: medial  -DS Location: coccyx  -DS Primary Wound Type: MASD  -DS    Retired Wound - Properties Group Date first assessed: 03/28/24  -DS Time first assessed: 0309  -DS Present on Original Admission: N  -DS Side: Other (See  comments)  -DS, medial  Location: coccyx  -DS Primary Wound Type: MASD  -DS      Row Name             Wound 03/28/24 1225 Right medial ankle Traumatic    Wound - Properties Group Placement Date: 03/28/24  -FP Placement Time: 1225  -FP Side: Right  -FP Orientation: medial  -FP Location: ankle  -FP Primary Wound Type: Traumatic  -FP    Retired Wound - Properties Group Placement Date: 03/28/24  -FP Placement Time: 1225  -FP Side: Right  -FP Orientation: medial  -FP Location: ankle  -FP Primary Wound Type: Traumatic  -FP    Retired Wound - Properties Group Date first assessed: 03/28/24  -FP Time first assessed: 1225  -FP Side: Right  -FP Location: ankle  -FP Primary Wound Type: Traumatic  -FP      Row Name             Wound 04/03/24 1730 Right anterior hip MASD (Moisture associated skin damage)    Wound - Properties Group Placement Date: 04/03/24  -SC Placement Time: 1730  -SC Side: Right  -SC Orientation: anterior  -SC Location: hip  -SC Primary Wound Type: MASD  -SC    Retired Wound - Properties Group Placement Date: 04/03/24  -SC Placement Time: 1730  -SC Side: Right  -SC Orientation: anterior  -SC Location: hip  -SC Primary Wound Type: MASD  -SC    Retired Wound - Properties Group Date first assessed: 04/03/24  -SC Time first assessed: 1730  -SC Side: Right  -SC Location: hip  -SC Primary Wound Type: MASD  -SC      Row Name 04/26/24 1416          Positioning and Restraints    Pre-Treatment Position in bed  -WM     Post Treatment Position bed  -WM     In Bed supine;call light within reach;encouraged to call for assist  -       Row Name 04/26/24 1416          Progress Summary (PT)    Progress Toward Functional Goals (PT) progress toward functional goals is gradual  -     Daily Progress Summary (PT) Pt participated in BLE exercises in supine. Pt declined tfansfers due to having diarrhea.  -               User Key  (r) = Recorded By, (t) = Taken By, (c) = Cosigned By      Initials Name Provider Type    MAREK Damico  Linda, RN Registered Nurse    Bettye Anand RN Registered Nurse    Nicole Elise RN Registered Nurse    Adilson Carmona LPN Licensed Nurse    Carson Johnson PTA Physical Therapist Assistant                  Section G              Section GG                       Physical Therapy Education       Title: PT OT SLP Therapies (Done)       Topic: Physical Therapy (Done)       Point: Mobility training (Done)       Learning Progress Summary             Patient Acceptance, E,D, DU by PG at 4/24/2024 1039    Acceptance, E,TB, VU by RM at 2/14/2024 0518    Acceptance, E,TB, VU by RM at 1/9/2024 0420    Acceptance, E, VU by CR at 12/30/2023 1750    Acceptance, E, VU by CR at 12/20/2023 1345    Acceptance, E, VU by CR at 12/19/2023 1004    Acceptance, E,TB, VU by RM at 11/30/2023 0420    Acceptance, E,TB, VU by MOE at 11/8/2023 1341                         Point: Precautions (Done)       Learning Progress Summary             Patient Acceptance, E,D, DU by PG at 4/24/2024 1039    Acceptance, E,TB, VU by RM at 2/14/2024 0518    Acceptance, E,TB, VU by RM at 1/9/2024 0420    Acceptance, E, VU by CR at 12/30/2023 1750    Acceptance, E,TB, VU by RM at 11/30/2023 0420    Acceptance, E,TB, VU by MOE at 11/8/2023 1341                                         User Key       Initials Effective Dates Name Provider Type Discipline     06/08/21 -  Bharath Berg RN Registered Nurse Nurse    CR 06/13/22 -  Adilson Baker LPN Licensed Nurse Nurse    BOB 06/16/21 -  Kodak Matos OT Occupational Therapist OT    MOE 06/03/21 -  Merlin Rao PT Physical Therapist PT                  PT Recommendation and Plan     Progress Summary (PT)  Progress Toward Functional Goals (PT): progress toward functional goals is gradual  Daily Progress Summary (PT): Pt participated in BLE exercises in supine. Pt declined tfansfers due to having diarrhea.   Outcome Measures       Row Name 04/26/24 1420 04/24/24 1200          How much help  from another person do you currently need...    Turning from your back to your side while in flat bed without using bedrails? 3  -WM 3  -MOE     Moving from lying on back to sitting on the side of a flat bed without bedrails? 3  -WM 3  -MOE     Moving to and from a bed to a chair (including a wheelchair)? 1  -WM 1  -MOE     Standing up from a chair using your arms (e.g., wheelchair, bedside chair)? 2  -WM 2  -MOE     Climbing 3-5 steps with a railing? 1  -WM 1  -MOE     To walk in hospital room? 2  -WM 2  -MOE     AM-PAC 6 Clicks Score (PT) 12  -WM 12  -MOE     Highest Level of Mobility Goal 4 --> Transfer to chair/commode  -WM 4 --> Transfer to chair/commode  -MOE        Functional Assessment    Outcome Measure Options -- AM-PAC 6 Clicks Basic Mobility (PT)  -MOE               User Key  (r) = Recorded By, (t) = Taken By, (c) = Cosigned By      Initials Name Provider Type    Carson Johnson PTA Physical Therapist Assistant    Merlin De La O, MERLIN Physical Therapist                     Time Calculation:    PT Charges       Row Name 04/26/24 1415             Time Calculation    PT Received On 04/26/24  -WM         Timed Charges    44664 - PT Therapeutic Exercise Minutes 16  -WM         SNF Physical Therapy Minutes    Skilled Minutes- PT 16 min  -WM         Total Minutes    Timed Charges Total Minutes 16  -WM       Total Minutes 16  -WM                User Key  (r) = Recorded By, (t) = Taken By, (c) = Cosigned By      Initials Name Provider Type    Carson Johnson PTA Physical Therapist Assistant                      PT G-Codes  Outcome Measure Options: AM-PAC 6 Clicks Daily Activity (OT), Optimal Instrument  AM-PAC 6 Clicks Score (PT): 12  AM-PAC 6 Clicks Score (OT): 16    Carson Inman PTA  4/26/2024

## 2024-04-26 NOTE — SIGNIFICANT NOTE
Wound Eval / Progress Noted    KEO Dominguez     Patient Name: Jose Shaikh  : 1960  MRN: [id number]  Today's Date: 2024                 Admit Date: 2023    Visit Dx:    ICD-10-CM ICD-9-CM   1. Difficulty in walking  R26.2 719.7   2. Type 2 diabetes mellitus with other specified complication, unspecified whether long term insulin use  E11.69 250.80         Debility    Ulcer of right foot    Ulcerated, foot, left, limited to breakdown of skin    Onychomycosis        Past Medical History:   Diagnosis Date    Absence of toe of right foot     Acute osteomyelitis of left calcaneus  2021    Anxiety and depression     Arthritis     Cancer     Chronic pain     STATES HIS PAIN IS 10/10 AAT    Claustrophobia     Corns and callus     Diabetic ulcer of left heel associated with type 2 DM 2021    Diabetic ulcer of left heel associated with type 2 DM 2021    Diabetic ulcer of right midfoot associated with type 2 DM 2021    Difficulty walking     Essential hypertension 2021    Hammertoe     Hyperlipidemia LDL goal <100 2021    Ingrown toenail     Obesity     Paroxysmal atrial fibrillation 2021    Polyneuropathy     Pressure ulcer, stage 1     Tinea unguium     Type 2 diabetes mellitus with polyneuropathy         Past Surgical History:   Procedure Laterality Date    CYST REMOVAL      center of back; benign    EYE SURGERY      INCISION AND DRAINAGE ABSCESS      back    INCISION AND DRAINAGE LEG Right 12/10/2021    Procedure: INCISION AND DRAINAGE LOWER EXTREMITY;  Surgeon: Ash Leyva DPM;  Location: [facility] MAIN OR;  Service: Podiatry;  Laterality: Right;    OTHER SURGICAL HISTORY      Surgical clips left foot    TOE SURGERY Right     Removal of 5th toe    TRANS METATARSAL AMPUTATION Right 2021    Procedure: AMPUTATION TRANS METATARSAL;  Surgeon: Ash Leyva DPM;  Location: [facility] MAIN OR;  Service: Podiatry;  Laterality: Right;    VASCULAR  SURGERY      WRIST SURGERY Left     repair of injury         Physical Assessment:  Wound 03/27/24 1045 Right anterior Skin Tear (Active)   Wound Image    04/26/24 1000   Dressing Appearance open to air 04/26/24 1600   Closure None 04/26/24 1600   Base purple;dry 04/26/24 1600   Red (%), Wound Tissue Color 20 04/26/24 1000   Periwound intact;blistered;pink 04/26/24 1600   Periwound Temperature warm 04/26/24 1600   Periwound Skin Turgor soft 04/26/24 1600   Edges rolled/closed 04/26/24 1600   Drainage Amount none 04/26/24 1600   Care, Wound cleansed with;sterile normal saline 04/26/24 1600   Dressing Care dressing applied;non-adherent;petroleum-based;gauze;silicone;border dressing 04/26/24 1600   Periwound Care absorptive dressing applied 04/26/24 1600     Wound Check / Follow-up: Patient was seen today at the request of the physician assistant. PA concerned with newly formed blister to the right anterior residual foot; PA would like a boot or an offloading device. Patient reports that when he got out of bed with physical therapy yesterday, he pivoted on his right residual limb.     Right residual limb with blood filled blister present. Periwound is pink with dry crusted flaking skin. Cleansed tissue with NS and covered with a non-adherent petroleum gauze and secured with a silicone border dressing. Recommending daily dressing changes with non-adherent petroleum gauze to the blister. Provide quality skin care and hygiene, apply purple top moisturizer to the bilateral legs and feet.    Splinting device molded with fiberglass casting; cast padding applied to the splint to protect tissue from sharp edges. Filled in gap at the distal edge of the split with gauze and secured with foam tape. Patient is to wear the splinting device when out of bed or if he becomes restless to prevent further injury to the lower extremity. Provide wound / skin care as recommended to the right foot; then wrap the right foot and lower leg with  gauze roll. Apply the splinting device to the leg / foot, then secure with ace wrap.     Buttocks is reddened with chronic discoloration but no open or visible wounds present a this time. Continue to encourage the patient to turn Q2H and offload at all time.      Impression: traumatic injury to the right residual limb    Short term goals:  regain skin integrity, skin protection, topical treatment, pressure reduction, daily skin care    Karon Bolton RN    4/26/2024    16:53 EDT

## 2024-04-27 LAB
GLUCOSE BLDC GLUCOMTR-MCNC: 113 MG/DL (ref 70–99)
GLUCOSE BLDC GLUCOMTR-MCNC: 116 MG/DL (ref 70–99)
GLUCOSE BLDC GLUCOMTR-MCNC: 131 MG/DL (ref 70–99)
GLUCOSE BLDC GLUCOMTR-MCNC: 134 MG/DL (ref 70–99)

## 2024-04-27 PROCEDURE — 63710000001 INSULIN DETEMIR PER 5 UNITS: Performed by: PHYSICIAN ASSISTANT

## 2024-04-27 PROCEDURE — 97110 THERAPEUTIC EXERCISES: CPT

## 2024-04-27 PROCEDURE — 82948 REAGENT STRIP/BLOOD GLUCOSE: CPT | Performed by: INTERNAL MEDICINE

## 2024-04-27 PROCEDURE — 82948 REAGENT STRIP/BLOOD GLUCOSE: CPT

## 2024-04-27 PROCEDURE — 97530 THERAPEUTIC ACTIVITIES: CPT

## 2024-04-27 RX ORDER — BACLOFEN 10 MG/1
10 TABLET ORAL 3 TIMES DAILY PRN
Status: DISPENSED | OUTPATIENT
Start: 2024-04-27 | End: 2024-05-17

## 2024-04-27 RX ADMIN — PREGABALIN 100 MG: 100 CAPSULE ORAL at 15:51

## 2024-04-27 RX ADMIN — FUROSEMIDE 40 MG: 40 TABLET ORAL at 08:59

## 2024-04-27 RX ADMIN — INSULIN DETEMIR 40 UNITS: 100 INJECTION, SOLUTION SUBCUTANEOUS at 21:19

## 2024-04-27 RX ADMIN — OXYCODONE AND ACETAMINOPHEN 1 TABLET: 10; 325 TABLET ORAL at 14:12

## 2024-04-27 RX ADMIN — Medication 10 MG: at 21:21

## 2024-04-27 RX ADMIN — SERTRALINE HYDROCHLORIDE 50 MG: 50 TABLET ORAL at 21:21

## 2024-04-27 RX ADMIN — OXYCODONE AND ACETAMINOPHEN 1 TABLET: 10; 325 TABLET ORAL at 05:38

## 2024-04-27 RX ADMIN — OXYCODONE AND ACETAMINOPHEN 1 TABLET: 10; 325 TABLET ORAL at 22:26

## 2024-04-27 RX ADMIN — INSULIN DETEMIR 40 UNITS: 100 INJECTION, SOLUTION SUBCUTANEOUS at 08:58

## 2024-04-27 RX ADMIN — EMPAGLIFLOZIN 10 MG: 10 TABLET, FILM COATED ORAL at 08:59

## 2024-04-27 RX ADMIN — Medication 500 MG: at 21:20

## 2024-04-27 RX ADMIN — DICLOFENAC SODIUM 4 G: 10 GEL TOPICAL at 08:59

## 2024-04-27 RX ADMIN — BACLOFEN 10 MG: 10 TABLET ORAL at 05:38

## 2024-04-27 RX ADMIN — DICLOFENAC SODIUM 4 G: 10 GEL TOPICAL at 17:18

## 2024-04-27 RX ADMIN — APIXABAN 5 MG: 5 TABLET, FILM COATED ORAL at 08:59

## 2024-04-27 RX ADMIN — FERROUS SULFATE TAB 325 MG (65 MG ELEMENTAL FE) 325 MG: 325 (65 FE) TAB at 08:59

## 2024-04-27 RX ADMIN — APIXABAN 5 MG: 5 TABLET, FILM COATED ORAL at 21:21

## 2024-04-27 RX ADMIN — PREGABALIN 100 MG: 100 CAPSULE ORAL at 21:20

## 2024-04-27 RX ADMIN — CYANOCOBALAMIN TAB 500 MCG 1000 MCG: 500 TAB at 08:59

## 2024-04-27 RX ADMIN — BACLOFEN 10 MG: 10 TABLET ORAL at 22:26

## 2024-04-27 RX ADMIN — DICLOFENAC SODIUM 4 G: 10 GEL TOPICAL at 21:21

## 2024-04-27 RX ADMIN — PREGABALIN 100 MG: 100 CAPSULE ORAL at 08:59

## 2024-04-27 RX ADMIN — LISINOPRIL 2.5 MG: 2.5 TABLET ORAL at 08:59

## 2024-04-27 RX ADMIN — ATORVASTATIN CALCIUM 10 MG: 10 TABLET, FILM COATED ORAL at 21:20

## 2024-04-27 RX ADMIN — SIMETHICONE 80 MG: 80 TABLET, CHEWABLE ORAL at 05:43

## 2024-04-27 RX ADMIN — BACLOFEN 10 MG: 10 TABLET ORAL at 14:12

## 2024-04-27 RX ADMIN — Medication 500 MG: at 08:59

## 2024-04-27 NOTE — THERAPY TREATMENT NOTE
SNF - Physical Therapy Progress Note  KEO Dominguez     Patient Name: Jose Shaikh  : 1960  MRN: [id number]  Today's Date: 2024      Visit Dx:    ICD-10-CM ICD-9-CM   1. Difficulty in walking  R26.2 719.7   2. Type 2 diabetes mellitus with other specified complication, unspecified whether long term insulin use  E11.69 250.80     Patient Active Problem List   Diagnosis    Diabetic ulcer of left heel associated with type 2 DM    Acute osteomyelitis of left calcaneus     Diabetic ulcer of left heel associated with type 2 DM    Diabetic ulcer of right midfoot associated with type 2 DM    Paroxysmal atrial fibrillation    Essential hypertension    Hyperlipidemia LDL goal <100    Cellulitis and abscess of foot    High alkaline phosphatase level    Osteomyelitis    Onychomycosis    Onychocryptosis    Foot pain, bilateral    Osteomyelitis of foot, right, acute    Cellulitis of right foot    Type 2 diabetes mellitus, with long-term current use of insulin    Class 3 severe obesity due to excess calories with serious comorbidity and body mass index (BMI) of 45.0 to 49.9 in adult    Anxiety disorder, unspecified    Claustrophobia    Dependence on wheelchair    Depression, unspecified    Long term (current) use of anticoagulants    Long term (current) use of oral hypoglycemic drugs    Wound of foot    Ulcer of right foot    Orthostatic hypotension    Other chronic osteomyelitis, right ankle and foot    Personal history of nicotine dependence    Thrombocytopenia, unspecified    Unspecified open wound, right foot, initial encounter    Diabetic foot infection    Subacute osteomyelitis of right foot    Right foot pain    Sepsis    Onychomycosis    Foot pain, left    Impaired mobility and ADLs    Absence of toe of right foot    Corns and callosity    Disability of walking    Fracture    Limb swelling    Polyneuropathy    Pressure ulcer, stage 1    Shortness of breath    Generalized weakness    Debility    Ulcerated, foot,  left, limited to breakdown of skin    Onychomycosis     Past Medical History:   Diagnosis Date    Absence of toe of right foot     Acute osteomyelitis of left calcaneus  08/18/2021    Anxiety and depression     Arthritis     Cancer     Chronic pain     STATES HIS PAIN IS 10/10 AAT    Claustrophobia     Corns and callus     Diabetic ulcer of left heel associated with type 2 DM 08/18/2021    Diabetic ulcer of left heel associated with type 2 DM 07/06/2021    Diabetic ulcer of right midfoot associated with type 2 DM 08/18/2021    Difficulty walking     Essential hypertension 08/31/2021    Hammertoe     Hyperlipidemia LDL goal <100 08/31/2021    Ingrown toenail     Obesity     Paroxysmal atrial fibrillation 08/31/2021    Polyneuropathy     Pressure ulcer, stage 1     Tinea unguium     Type 2 diabetes mellitus with polyneuropathy      Past Surgical History:   Procedure Laterality Date    CYST REMOVAL      center of back; benign    EYE SURGERY      INCISION AND DRAINAGE ABSCESS      back    INCISION AND DRAINAGE LEG Right 12/10/2021    Procedure: INCISION AND DRAINAGE LOWER EXTREMITY;  Surgeon: Ash Leyva DPM;  Location: [facility];  Service: Podiatry;  Laterality: Right;    OTHER SURGICAL HISTORY      Surgical clips left foot    TOE SURGERY Right     Removal of 5th toe    TRANS METATARSAL AMPUTATION Right 12/02/2021    Procedure: AMPUTATION TRANS METATARSAL;  Surgeon: Ash Leyva DPM;  Location: [facility];  Service: Podiatry;  Laterality: Right;    VASCULAR SURGERY      WRIST SURGERY Left     repair of injury       PT Assessment (Last 12 Hours)       PT Evaluation and Treatment       Row Name 04/27/24 1200          Physical Therapy Time and Intention    Subjective Information no complaints  -CS     Document Type therapy note (daily note)  -CS     Mode of Treatment individual therapy;physical therapy  -CS     Patient Effort good  -CS     Symptoms Noted During/After Treatment fatigue  -CS        Row Name 04/27/24 1200          Bed Mobility    Supine-Sit [place] (Bed Mobility) verbal cues;minimum assist (75% patient effort);1 person assist  Pt. pulled against PTA's hand for assistance to EOB  -     Sit-Supine [place] (Bed Mobility) verbal cues;minimum assist (75% patient effort);1 person assist  Required assistance for B LE to lift up onto bed  -     Bed Mobility, Safety Issues decreased use of arms for pushing/pulling;decreased use of legs for bridging/pushing  -     Assistive Device (Bed Mobility) bed rails;overhead trapeze  -       Row Name 04/27/24 1200          Motor Skills    Therapeutic Exercise core strength  -       Row Name 04/27/24 1200          Hip (Therapeutic Exercise)    Hip AROM (Therapeutic Exercise) bilateral;aBduction;aDduction;sitting;30 repititions  -     Hip AAROM (Therapeutic Exercise) bilateral;flexion;extension;sitting;10 repetitions;3 sets  -       Row Name 04/27/24 1200          Knee (Therapeutic Exercise)    Knee AROM (Therapeutic Exercise) bilateral;LAQ (long arc quad);sitting;30 repititions  -       Row Name 04/27/24 1200          Ankle (Therapeutic Exercise)    Ankle AROM (Therapeutic Exercise) bilateral;dorsiflexion;plantarflexion;sitting;30 repititions  -       Row Name 04/27/24 1200          Core Strength (Therapeutic Exercise)    Core Strength (Therapeutic Exercise) sitting;10 repetitions;3 sets  lateral weight shifting onto elbows and mini crunches on side of bed  -       Row Name             Wound 03/27/24 1045 Right anterior Skin Tear    Wound - Properties Group Placement Date: 03/27/24  -CR Placement Time: 1045  -CR Side: Right  -CR Orientation: anterior  -CR Primary Wound Type: Skin tear  -CR    Retired Wound - Properties Group Placement Date: 03/27/24  -CR Placement Time: 1045  -CR Side: Right  -CR Orientation: anterior  -CR Primary Wound Type: Skin tear  -CR    Retired Wound - Properties Group Date first assessed: 03/27/24  -CR Time  first assessed: 1045  -CR Side: Right  -CR Primary Wound Type: Skin tear  -CR      Row Name             Wound 03/28/24 0309 Other (See comments) medial coccyx MASD (Moisture associated skin damage)    Wound - Properties Group Placement Date: 03/28/24  -DS Placement Time: 0309  -DS Present on Original Admission: N  -DS Side: Other (See comments)  -DS, medial  Orientation: medial  -DS Location: coccyx  -DS Primary Wound Type: MASD  -DS    Retired Wound - Properties Group Placement Date: 03/28/24  -DS Placement Time: 0309  -DS Present on Original Admission: N  -DS Side: Other (See comments)  -DS, medial  Orientation: medial  -DS Location: coccyx  -DS Primary Wound Type: MASD  -DS    Retired Wound - Properties Group Date first assessed: 03/28/24  -DS Time first assessed: 0309  -DS Present on Original Admission: N  -DS Side: Other (See comments)  -DS, medial  Location: coccyx  -DS Primary Wound Type: MASD  -DS      Row Name             Wound 03/28/24 1225 Right medial ankle Traumatic    Wound - Properties Group Placement Date: 03/28/24  -FP Placement Time: 1225  -FP Side: Right  -FP Orientation: medial  -FP Location: ankle  -FP Primary Wound Type: Traumatic  -FP    Retired Wound - Properties Group Placement Date: 03/28/24  -FP Placement Time: 1225  -FP Side: Right  -FP Orientation: medial  -FP Location: ankle  -FP Primary Wound Type: Traumatic  -FP    Retired Wound - Properties Group Date first assessed: 03/28/24  -FP Time first assessed: 1225  -FP Side: Right  -FP Location: ankle  -FP Primary Wound Type: Traumatic  -FP      Row Name             Wound 04/03/24 1730 Right anterior hip MASD (Moisture associated skin damage)    Wound - Properties Group Placement Date: 04/03/24  -RI Placement Time: 1730  -RI Side: Right  -RI Orientation: anterior  -RI Location: hip  -RI Primary Wound Type: MASD  -RI    Retired Wound - Properties Group Placement Date: 04/03/24  -RI Placement Time: 1730  -RI Side: Right  -RI Orientation:  anterior  -AK Location: hip  -AK Primary Wound Type: MASD  -AK    Retired Wound - Properties Group Date first assessed: 04/03/24  -AK Time first assessed: 1730  -AK Side: Right  -AK Location: hip  -AK Primary Wound Type: MASD  -AK      Row Name 04/27/24 1200          Positioning and Restraints    Pre-Treatment Position in bed  -CS     Post Treatment Position bed  -CS     In Bed fowlers;call light within reach;encouraged to call for assist  no alarms active upon entering room  -CS       Row Name 04/27/24 1200          Progress Summary (PT)    Progress Toward Functional Goals (PT) progress toward functional goals is gradual  -CS               User Key  (r) = Recorded By, (t) = Taken By, (c) = Cosigned By      Initials Name Provider Type    DS Linda Damico, RN Registered Nurse    Bettye Anand, RN Registered Nurse    Nicole Elise RN Registered Nurse    Adilson Carmona LPN Licensed Nurse    Ronald Huerta PTA Physical Therapist Assistant                  Section G              Section GG                       Physical Therapy Education       Title: PT OT SLP Therapies (Done)       Topic: Physical Therapy (Done)       Point: Mobility training (Done)       Learning Progress Summary             Patient Acceptance, E,D, DU by PG at 4/24/2024 1039    Acceptance, E,TB, VU by RM at 2/14/2024 0518    Acceptance, E,TB, VU by RM at 1/9/2024 0420    Acceptance, E, VU by CR at 12/30/2023 1750    Acceptance, E, VU by CR at 12/20/2023 1345    Acceptance, E, VU by CR at 12/19/2023 1004    Acceptance, E,TB, VU by RM at 11/30/2023 0420    Acceptance, E,TB, VU by MOE at 11/8/2023 1341                         Point: Precautions (Done)       Learning Progress Summary             Patient Acceptance, E,D, DU by PG at 4/24/2024 1039    Acceptance, E,TB, VU by RM at 2/14/2024 0518    Acceptance, E,TB, VU by RM at 1/9/2024 0420    Acceptance, E, VU by CR at 12/30/2023 1750    Acceptance, E,TB, VU by RM at 11/30/2023 0420     Acceptance, E,TB, VU by MOE at 11/8/2023 1341                                         User Key       Initials Effective Dates Name Provider Type Discipline     06/08/21 -  Bharath Berg, RN Registered Nurse Nurse    CR 06/13/22 -  Adilson Baker LPN Licensed Nurse Nurse    PG 06/16/21 -  Kodak Matos OT Occupational Therapist OT    MOE 06/03/21 -  Merlin Rao PT Physical Therapist PT                  PT Recommendation and Plan     Progress Summary (PT)  Progress Toward Functional Goals (PT): progress toward functional goals is gradual  Daily Progress Summary (PT): Decreased assistance for ther-ex's today in comparison to treatment I performed with pt. last weekend.  Pt. still requires assistance but overall, assistance level decreased with range of motion in bilateral lower extremeties.   Outcome Measures       Row Name 04/27/24 1200 04/26/24 1420          How much help from another person do you currently need...    Turning from your back to your side while in flat bed without using bedrails? 3  -CS 3  -WM     Moving from lying on back to sitting on the side of a flat bed without bedrails? 3  -CS 3  -WM     Moving to and from a bed to a chair (including a wheelchair)? 1  -CS 1  -WM     Standing up from a chair using your arms (e.g., wheelchair, bedside chair)? 2  -CS 2  -WM     Climbing 3-5 steps with a railing? 1  -CS 1  -WM     To walk in hospital room? 2  -CS 2  -WM     AM-PAC 6 Clicks Score (PT) 12  -CS 12  -WM     Highest Level of Mobility Goal 4 --> Transfer to chair/commode  -CS 4 --> Transfer to chair/commode  -WM        Functional Assessment    Outcome Measure Options AM-PAC 6 Clicks Basic Mobility (PT)  -CS --               User Key  (r) = Recorded By, (t) = Taken By, (c) = Cosigned By      Initials Name Provider Type    WM Carson Inman PTA Physical Therapist Assistant    Ronald Huerta PTA Physical Therapist Assistant                     Time Calculation:    PT Charges       Row  Name 04/27/24 1239             Time Calculation    Start Time 1039  -CS      PT Received On 04/27/24  -CS         Timed Charges    34473 - PT Therapeutic Exercise Minutes 25  -CS      72740 - PT Therapeutic Activity Minutes 14  -CS         SNF Physical Therapy Minutes    Skilled Minutes- PT 39 min  -CS         Total Minutes    Timed Charges Total Minutes 39  -CS       Total Minutes 39  -CS                User Key  (r) = Recorded By, (t) = Taken By, (c) = Cosigned By      Initials Name Provider Type     Ronald Fabian PTA Physical Therapist Assistant                  Therapy Charges for Today       Code Description Service Date Service Provider Modifiers Qty    71565929215 HC PT THER PROC EA 15 MIN 4/27/2024 Ronald Fabian PTA GP 2    96943343683 HC PT THERAPEUTIC ACT EA 15 MIN 4/27/2024 Ronald Fabian PTA GP 1            PT G-Codes  Outcome Measure Options: AM-PAC 6 Clicks Basic Mobility (PT)  AM-PAC 6 Clicks Score (PT): 12  AM-PAC 6 Clicks Score (OT): 16    Ronald Fabian PTA  4/27/2024

## 2024-04-27 NOTE — PLAN OF CARE
Goal Outcome Evaluation:  Plan of Care Reviewed With: patient        Progress: no change  Outcome Evaluation: AOx4, call light and personal items within reach. Able to make needs known to staffing. 1x on the bedpan. Skin barrier encouraged. Wound care tolorated well. C/o hip and shoulder pain, medication given. Help with postional changes. Con't checking blood sugar, see labs. Con't plan of care

## 2024-04-27 NOTE — PLAN OF CARE
Goal Outcome Evaluation:  Plan of Care Reviewed With: patient        Progress: no change  Outcome Evaluation: Alert and oriented X 4. Vital signs stable Baclofen and Percocet administered at HS for left hip pain. Continues to use trapeze to reposition self and refuse every two hour turning and repositioning from staff. Continue plan of care.

## 2024-04-28 LAB
GLUCOSE BLDC GLUCOMTR-MCNC: 121 MG/DL (ref 70–99)
GLUCOSE BLDC GLUCOMTR-MCNC: 131 MG/DL (ref 70–99)
GLUCOSE BLDC GLUCOMTR-MCNC: 138 MG/DL (ref 70–99)
GLUCOSE BLDC GLUCOMTR-MCNC: 156 MG/DL (ref 70–99)

## 2024-04-28 PROCEDURE — 82948 REAGENT STRIP/BLOOD GLUCOSE: CPT

## 2024-04-28 PROCEDURE — 63710000001 INSULIN DETEMIR PER 5 UNITS: Performed by: PHYSICIAN ASSISTANT

## 2024-04-28 PROCEDURE — 63710000001 INSULIN LISPRO (HUMAN) PER 5 UNITS: Performed by: INTERNAL MEDICINE

## 2024-04-28 PROCEDURE — 63710000001 ONDANSETRON ODT 4 MG TABLET DISPERSIBLE: Performed by: INTERNAL MEDICINE

## 2024-04-28 PROCEDURE — 97110 THERAPEUTIC EXERCISES: CPT

## 2024-04-28 PROCEDURE — 82948 REAGENT STRIP/BLOOD GLUCOSE: CPT | Performed by: INTERNAL MEDICINE

## 2024-04-28 RX ADMIN — APIXABAN 5 MG: 5 TABLET, FILM COATED ORAL at 08:28

## 2024-04-28 RX ADMIN — INSULIN LISPRO 4 UNITS: 100 INJECTION, SOLUTION INTRAVENOUS; SUBCUTANEOUS at 20:07

## 2024-04-28 RX ADMIN — SERTRALINE HYDROCHLORIDE 50 MG: 50 TABLET ORAL at 20:08

## 2024-04-28 RX ADMIN — OXYCODONE AND ACETAMINOPHEN 1 TABLET: 10; 325 TABLET ORAL at 07:05

## 2024-04-28 RX ADMIN — Medication 500 MG: at 20:08

## 2024-04-28 RX ADMIN — DICLOFENAC SODIUM 4 G: 10 GEL TOPICAL at 20:06

## 2024-04-28 RX ADMIN — DICLOFENAC SODIUM 4 G: 10 GEL TOPICAL at 17:36

## 2024-04-28 RX ADMIN — FUROSEMIDE 40 MG: 40 TABLET ORAL at 08:28

## 2024-04-28 RX ADMIN — DICLOFENAC SODIUM 4 G: 10 GEL TOPICAL at 08:27

## 2024-04-28 RX ADMIN — DICLOFENAC SODIUM 4 G: 10 GEL TOPICAL at 11:44

## 2024-04-28 RX ADMIN — ACETAMINOPHEN 650 MG: 325 TABLET ORAL at 03:20

## 2024-04-28 RX ADMIN — OXYCODONE AND ACETAMINOPHEN 1 TABLET: 10; 325 TABLET ORAL at 20:08

## 2024-04-28 RX ADMIN — INSULIN DETEMIR 40 UNITS: 100 INJECTION, SOLUTION SUBCUTANEOUS at 20:07

## 2024-04-28 RX ADMIN — PREGABALIN 100 MG: 100 CAPSULE ORAL at 20:08

## 2024-04-28 RX ADMIN — PREGABALIN 100 MG: 100 CAPSULE ORAL at 15:33

## 2024-04-28 RX ADMIN — Medication 10 MG: at 20:08

## 2024-04-28 RX ADMIN — EMPAGLIFLOZIN 10 MG: 10 TABLET, FILM COATED ORAL at 08:27

## 2024-04-28 RX ADMIN — FERROUS SULFATE TAB 325 MG (65 MG ELEMENTAL FE) 325 MG: 325 (65 FE) TAB at 08:27

## 2024-04-28 RX ADMIN — IBUPROFEN 400 MG: 400 TABLET ORAL at 15:33

## 2024-04-28 RX ADMIN — BACLOFEN 10 MG: 10 TABLET ORAL at 07:05

## 2024-04-28 RX ADMIN — ONDANSETRON 4 MG: 4 TABLET, ORALLY DISINTEGRATING ORAL at 08:37

## 2024-04-28 RX ADMIN — Medication 500 MG: at 08:27

## 2024-04-28 RX ADMIN — ATORVASTATIN CALCIUM 10 MG: 10 TABLET, FILM COATED ORAL at 20:07

## 2024-04-28 RX ADMIN — BACLOFEN 10 MG: 10 TABLET ORAL at 20:08

## 2024-04-28 RX ADMIN — IBUPROFEN 400 MG: 400 TABLET ORAL at 01:21

## 2024-04-28 RX ADMIN — LISINOPRIL 2.5 MG: 2.5 TABLET ORAL at 08:28

## 2024-04-28 RX ADMIN — INSULIN DETEMIR 40 UNITS: 100 INJECTION, SOLUTION SUBCUTANEOUS at 08:27

## 2024-04-28 RX ADMIN — PREGABALIN 100 MG: 100 CAPSULE ORAL at 08:27

## 2024-04-28 RX ADMIN — CYANOCOBALAMIN TAB 500 MCG 1000 MCG: 500 TAB at 08:27

## 2024-04-28 RX ADMIN — APIXABAN 5 MG: 5 TABLET, FILM COATED ORAL at 20:08

## 2024-04-28 NOTE — PLAN OF CARE
Goal Outcome Evaluation:  Plan of Care Reviewed With: patient        Progress: no change  Outcome Evaluation: AOx4, call light and personal items within reach. Able to make needs known to staffing. 1x on the bedpan. Skin barrier PRN. Wound care tolorated well. Help with some postional changes. Con't checking blood sugar, see labs. C/o left hip and should pain, medication given (see emar). Con't plan of care

## 2024-04-28 NOTE — THERAPY TREATMENT NOTE
SNF - Physical Therapy Progress Note  KEO Dominguez     Patient Name: Jose Shaikh  : 1960  MRN: [id number]  Today's Date: 2024      Visit Dx:    ICD-10-CM ICD-9-CM   1. Difficulty in walking  R26.2 719.7   2. Type 2 diabetes mellitus with other specified complication, unspecified whether long term insulin use  E11.69 250.80     Patient Active Problem List   Diagnosis    Diabetic ulcer of left heel associated with type 2 DM    Acute osteomyelitis of left calcaneus     Diabetic ulcer of left heel associated with type 2 DM    Diabetic ulcer of right midfoot associated with type 2 DM    Paroxysmal atrial fibrillation    Essential hypertension    Hyperlipidemia LDL goal <100    Cellulitis and abscess of foot    High alkaline phosphatase level    Osteomyelitis    Onychomycosis    Onychocryptosis    Foot pain, bilateral    Osteomyelitis of foot, right, acute    Cellulitis of right foot    Type 2 diabetes mellitus, with long-term current use of insulin    Class 3 severe obesity due to excess calories with serious comorbidity and body mass index (BMI) of 45.0 to 49.9 in adult    Anxiety disorder, unspecified    Claustrophobia    Dependence on wheelchair    Depression, unspecified    Long term (current) use of anticoagulants    Long term (current) use of oral hypoglycemic drugs    Wound of foot    Ulcer of right foot    Orthostatic hypotension    Other chronic osteomyelitis, right ankle and foot    Personal history of nicotine dependence    Thrombocytopenia, unspecified    Unspecified open wound, right foot, initial encounter    Diabetic foot infection    Subacute osteomyelitis of right foot    Right foot pain    Sepsis    Onychomycosis    Foot pain, left    Impaired mobility and ADLs    Absence of toe of right foot    Corns and callosity    Disability of walking    Fracture    Limb swelling    Polyneuropathy    Pressure ulcer, stage 1    Shortness of breath    Generalized weakness    Debility    Ulcerated, foot,  left, limited to breakdown of skin    Onychomycosis     Past Medical History:   Diagnosis Date    Absence of toe of right foot     Acute osteomyelitis of left calcaneus  08/18/2021    Anxiety and depression     Arthritis     Cancer     Chronic pain     STATES HIS PAIN IS 10/10 AAT    Claustrophobia     Corns and callus     Diabetic ulcer of left heel associated with type 2 DM 08/18/2021    Diabetic ulcer of left heel associated with type 2 DM 07/06/2021    Diabetic ulcer of right midfoot associated with type 2 DM 08/18/2021    Difficulty walking     Essential hypertension 08/31/2021    Hammertoe     Hyperlipidemia LDL goal <100 08/31/2021    Ingrown toenail     Obesity     Paroxysmal atrial fibrillation 08/31/2021    Polyneuropathy     Pressure ulcer, stage 1     Tinea unguium     Type 2 diabetes mellitus with polyneuropathy      Past Surgical History:   Procedure Laterality Date    CYST REMOVAL      center of back; benign    EYE SURGERY      INCISION AND DRAINAGE ABSCESS      back    INCISION AND DRAINAGE LEG Right 12/10/2021    Procedure: INCISION AND DRAINAGE LOWER EXTREMITY;  Surgeon: Ash Leyva DPM;  Location: [facility];  Service: Podiatry;  Laterality: Right;    OTHER SURGICAL HISTORY      Surgical clips left foot    TOE SURGERY Right     Removal of 5th toe    TRANS METATARSAL AMPUTATION Right 12/02/2021    Procedure: AMPUTATION TRANS METATARSAL;  Surgeon: Ash Leyva DPM;  Location: [facility];  Service: Podiatry;  Laterality: Right;    VASCULAR SURGERY      WRIST SURGERY Left     repair of injury       PT Assessment (Last 12 Hours)       PT Evaluation and Treatment       Row Name 04/28/24 1600          Physical Therapy Time and Intention    Subjective Information no complaints  -CS     Document Type therapy note (daily note)  -CS     Mode of Treatment individual therapy;physical therapy  -CS     Patient Effort good  -CS     Symptoms Noted During/After Treatment none  -CS        Row Name 04/28/24 1600          Pain    Pretreatment Pain Rating 8/10  -CS     Posttreatment Pain Rating 8/10  -CS     Pain Location - Side/Orientation Left  -CS     Pain Location lower  -CS     Pain Location - extremity  -CS     Pain Intervention(s) Rest;Distraction  -CS       Row Name 04/28/24 1600          Hip (Therapeutic Exercise)    Hip AROM (Therapeutic Exercise) bilateral;aBduction;aDduction;external rotation;internal rotation;supine;30 repititions  R heel slides, SLR's  -CS     Hip AAROM (Therapeutic Exercise) left;supine;10 repetitions;3 sets  SLR's, heel slides  -CS     Hip Isometrics (Therapeutic Exercise) bilateral;gluteal sets;supine;10 second hold;3 sets  -CS       Row Name 04/28/24 1600          Knee (Therapeutic Exercise)    Knee AROM (Therapeutic Exercise) bilateral;SAQ (short arc quad);supine;30 repititions  -CS     Knee Isometrics (Therapeutic Exercise) bilateral;quad sets;supine;10 repetitions;3 sets  -CS       Row Name 04/28/24 1600          Ankle (Therapeutic Exercise)    Ankle AROM (Therapeutic Exercise) bilateral;dorsiflexion;plantarflexion;supine;30 repititions  -CS       Row Name             Wound 03/27/24 1045 Right anterior Skin Tear    Wound - Properties Group Placement Date: 03/27/24  -CR Placement Time: 1045  -CR Side: Right  -CR Orientation: anterior  -CR Primary Wound Type: Skin tear  -CR    Retired Wound - Properties Group Placement Date: 03/27/24  -CR Placement Time: 1045  -CR Side: Right  -CR Orientation: anterior  -CR Primary Wound Type: Skin tear  -CR    Retired Wound - Properties Group Date first assessed: 03/27/24  -CR Time first assessed: 1045  -CR Side: Right  -CR Primary Wound Type: Skin tear  -CR      Row Name             Wound 03/28/24 0309 Other (See comments) medial coccyx MASD (Moisture associated skin damage)    Wound - Properties Group Placement Date: 03/28/24  -DS Placement Time: 0309  -DS Present on Original Admission: N  -DS Side: Other (See comments)  -DS,  medial  Orientation: medial  -DS Location: coccyx  -DS Primary Wound Type: MASD  -DS    Retired Wound - Properties Group Placement Date: 03/28/24  -DS Placement Time: 0309  -DS Present on Original Admission: N  -DS Side: Other (See comments)  -DS, medial  Orientation: medial  -DS Location: coccyx  -DS Primary Wound Type: MASD  -DS    Retired Wound - Properties Group Date first assessed: 03/28/24  -DS Time first assessed: 0309  -DS Present on Original Admission: N  -DS Side: Other (See comments)  -DS, medial  Location: coccyx  -DS Primary Wound Type: MASD  -DS      Row Name             Wound 03/28/24 1225 Right medial ankle Traumatic    Wound - Properties Group Placement Date: 03/28/24  -FP Placement Time: 1225  -FP Side: Right  -FP Orientation: medial  -FP Location: ankle  -FP Primary Wound Type: Traumatic  -FP    Retired Wound - Properties Group Placement Date: 03/28/24  -FP Placement Time: 1225  -FP Side: Right  -FP Orientation: medial  -FP Location: ankle  -FP Primary Wound Type: Traumatic  -FP    Retired Wound - Properties Group Date first assessed: 03/28/24  -FP Time first assessed: 1225  -FP Side: Right  -FP Location: ankle  -FP Primary Wound Type: Traumatic  -FP      Row Name             Wound 04/03/24 1730 Right anterior hip MASD (Moisture associated skin damage)    Wound - Properties Group Placement Date: 04/03/24  -NY Placement Time: 1730  -NY Side: Right  -NY Orientation: anterior  -NY Location: hip  -NY Primary Wound Type: MASD  -NY    Retired Wound - Properties Group Placement Date: 04/03/24  -NY Placement Time: 1730  -NY Side: Right  -NY Orientation: anterior  -NY Location: hip  -NY Primary Wound Type: MASD  -NY    Retired Wound - Properties Group Date first assessed: 04/03/24  -NY Time first assessed: 1730  -NY Side: Right  -NY Location: hip  -NY Primary Wound Type: MASD  -NY      Row Name 04/28/24 1600          Positioning and Restraints    Pre-Treatment Position in bed  -CS     Post Treatment  Position bed  -CS     In Bed fowlers;call light within reach;encouraged to call for assist  no alarms active upon entering room  -CS       Row Name 04/28/24 1600          Progress Summary (PT)    Progress Toward Functional Goals (PT) progress toward functional goals is gradual  -CS               User Key  (r) = Recorded By, (t) = Taken By, (c) = Cosigned By      Initials Name Provider Type    DS Linda Damico, RN Registered Nurse    Bettye Anand, RN Registered Nurse    Nicole Elise RN Registered Nurse    Adilson Carmona LPN Licensed Nurse    Ronald Huerta PTA Physical Therapist Assistant                  Section G              Section GG                       Physical Therapy Education       Title: PT OT SLP Therapies (Done)       Topic: Physical Therapy (Done)       Point: Mobility training (Done)       Learning Progress Summary             Patient Acceptance, E,D, DU by PG at 4/24/2024 1039    Acceptance, E,TB, VU by RM at 2/14/2024 0518    Acceptance, E,TB, VU by RM at 1/9/2024 0420    Acceptance, E, VU by CR at 12/30/2023 1750    Acceptance, E, VU by CR at 12/20/2023 1345    Acceptance, E, VU by CR at 12/19/2023 1004    Acceptance, E,TB, VU by RM at 11/30/2023 0420    Acceptance, E,TB, VU by MOE at 11/8/2023 1341                         Point: Precautions (Done)       Learning Progress Summary             Patient Acceptance, E,D, DU by PG at 4/24/2024 1039    Acceptance, E,TB, VU by RM at 2/14/2024 0518    Acceptance, E,TB, VU by RM at 1/9/2024 0420    Acceptance, E, VU by CR at 12/30/2023 1750    Acceptance, E,TB, VU by RM at 11/30/2023 0420    Acceptance, E,TB, VU by MOE at 11/8/2023 1341                                         User Key       Initials Effective Dates Name Provider Type Discipline     06/08/21 -  Bharath Berg, RN Registered Nurse Nurse    ROLY 06/13/22 -  Adilson Baker LPN Licensed Nurse Nurse    BOB 06/16/21 -  Kodak Matos OT Occupational Therapist OT    MOE  06/03/21 -  Merlin Rao, PT Physical Therapist PT                  PT Recommendation and Plan     Progress Summary (PT)  Progress Toward Functional Goals (PT): progress toward functional goals is gradual  Daily Progress Summary (PT): Decreased assistance for ther-ex's today in comparison to treatment I performed with pt. last weekend.  Pt. still requires assistance but overall, assistance level decreased with range of motion in bilateral lower extremeties.   Outcome Measures       Row Name 04/28/24 1600 04/27/24 1200 04/26/24 1420       How much help from another person do you currently need...    Turning from your back to your side while in flat bed without using bedrails? 3  -CS 3  -CS 3  -WM    Moving from lying on back to sitting on the side of a flat bed without bedrails? 3  -CS 3  -CS 3  -WM    Moving to and from a bed to a chair (including a wheelchair)? 1  -CS 1  -CS 1  -WM    Standing up from a chair using your arms (e.g., wheelchair, bedside chair)? 2  -CS 2  -CS 2  -WM    Climbing 3-5 steps with a railing? 1  -CS 1  -CS 1  -WM    To walk in hospital room? 2  -CS 2  -CS 2  -WM    AM-PAC 6 Clicks Score (PT) 12  -CS 12  -CS 12  -WM    Highest Level of Mobility Goal 4 --> Transfer to chair/commode  -CS 4 --> Transfer to chair/commode  -CS 4 --> Transfer to chair/commode  -WM       Functional Assessment    Outcome Measure Options AM-PAC 6 Clicks Basic Mobility (PT)  -CS AM-PAC 6 Clicks Basic Mobility (PT)  -CS --              User Key  (r) = Recorded By, (t) = Taken By, (c) = Cosigned By      Initials Name Provider Type    WM Carson Inman PTA Physical Therapist Assistant    Ronald Huerta PTA Physical Therapist Assistant                     Time Calculation:    PT Charges       Row Name 04/28/24 1617             Time Calculation    Start Time 1554  -CS      PT Received On 04/28/24  -         Timed Charges    80231 - PT Therapeutic Exercise Minutes 23  -CS         SNF Physical Therapy Minutes     Skilled Minutes- PT 23 min  -CS         Total Minutes    Timed Charges Total Minutes 23  -CS       Total Minutes 23  -CS                User Key  (r) = Recorded By, (t) = Taken By, (c) = Cosigned By      Initials Name Provider Type    CS Ronald Fabian PTA Physical Therapist Assistant                  Therapy Charges for Today       Code Description Service Date Service Provider Modifiers Qty    29225959473 HC PT THER PROC EA 15 MIN 4/27/2024 Ronald Fabian PTA GP 2    04814854624 HC PT THERAPEUTIC ACT EA 15 MIN 4/27/2024 Ronald Fabian PTA GP 1    63796571393 HC PT THER PROC EA 15 MIN 4/28/2024 Ronald Fabian PTA GP 2            PT G-Codes  Outcome Measure Options: AM-PAC 6 Clicks Basic Mobility (PT)  AM-PAC 6 Clicks Score (PT): 12  AM-PAC 6 Clicks Score (OT): 16    Ronald Fabian PTA  4/28/2024

## 2024-04-28 NOTE — PLAN OF CARE
Goal Outcome Evaluation:  Plan of Care Reviewed With: patient        Progress: no change  Outcome Evaluation: Alert and oriented. Able to communicate needs. No significant change. Awake much of the night watching television. Norco, baclofen, ibuprofen, and tylenol administered during night for management of left shoulder and hip pain.    Correction: Percocet, not Norco, ordered and administered.

## 2024-04-29 LAB
ANION GAP SERPL CALCULATED.3IONS-SCNC: 10.4 MMOL/L (ref 5–15)
BUN SERPL-MCNC: 15 MG/DL (ref 8–23)
BUN/CREAT SERPL: 22.1 (ref 7–25)
CALCIUM SPEC-SCNC: 8.3 MG/DL (ref 8.6–10.5)
CHLORIDE SERPL-SCNC: 102 MMOL/L (ref 98–107)
CO2 SERPL-SCNC: 24.6 MMOL/L (ref 22–29)
CREAT SERPL-MCNC: 0.68 MG/DL (ref 0.76–1.27)
EGFRCR SERPLBLD CKD-EPI 2021: 103.2 ML/MIN/1.73
GLUCOSE BLDC GLUCOMTR-MCNC: 103 MG/DL (ref 70–99)
GLUCOSE BLDC GLUCOMTR-MCNC: 131 MG/DL (ref 70–99)
GLUCOSE BLDC GLUCOMTR-MCNC: 154 MG/DL (ref 70–99)
GLUCOSE BLDC GLUCOMTR-MCNC: 167 MG/DL (ref 70–99)
GLUCOSE SERPL-MCNC: 150 MG/DL (ref 65–99)
POTASSIUM SERPL-SCNC: 4.2 MMOL/L (ref 3.5–5.2)
SODIUM SERPL-SCNC: 137 MMOL/L (ref 136–145)

## 2024-04-29 PROCEDURE — 97110 THERAPEUTIC EXERCISES: CPT

## 2024-04-29 PROCEDURE — 97116 GAIT TRAINING THERAPY: CPT

## 2024-04-29 PROCEDURE — 82948 REAGENT STRIP/BLOOD GLUCOSE: CPT | Performed by: INTERNAL MEDICINE

## 2024-04-29 PROCEDURE — 97530 THERAPEUTIC ACTIVITIES: CPT

## 2024-04-29 PROCEDURE — 82948 REAGENT STRIP/BLOOD GLUCOSE: CPT

## 2024-04-29 PROCEDURE — 63710000001 INSULIN DETEMIR PER 5 UNITS: Performed by: PHYSICIAN ASSISTANT

## 2024-04-29 PROCEDURE — 80048 BASIC METABOLIC PNL TOTAL CA: CPT | Performed by: PHYSICIAN ASSISTANT

## 2024-04-29 PROCEDURE — 63710000001 INSULIN LISPRO (HUMAN) PER 5 UNITS: Performed by: INTERNAL MEDICINE

## 2024-04-29 RX ORDER — OXYCODONE AND ACETAMINOPHEN 10; 325 MG/1; MG/1
1 TABLET ORAL EVERY 8 HOURS PRN
Status: DISPENSED | OUTPATIENT
Start: 2024-04-29 | End: 2024-05-19

## 2024-04-29 RX ADMIN — PREGABALIN 100 MG: 100 CAPSULE ORAL at 16:31

## 2024-04-29 RX ADMIN — APIXABAN 5 MG: 5 TABLET, FILM COATED ORAL at 21:13

## 2024-04-29 RX ADMIN — INSULIN LISPRO 4 UNITS: 100 INJECTION, SOLUTION INTRAVENOUS; SUBCUTANEOUS at 21:13

## 2024-04-29 RX ADMIN — INSULIN DETEMIR 40 UNITS: 100 INJECTION, SOLUTION SUBCUTANEOUS at 08:39

## 2024-04-29 RX ADMIN — CYANOCOBALAMIN TAB 500 MCG 1000 MCG: 500 TAB at 08:39

## 2024-04-29 RX ADMIN — DICLOFENAC SODIUM 4 G: 10 GEL TOPICAL at 18:00

## 2024-04-29 RX ADMIN — APIXABAN 5 MG: 5 TABLET, FILM COATED ORAL at 08:39

## 2024-04-29 RX ADMIN — Medication 500 MG: at 21:13

## 2024-04-29 RX ADMIN — Medication 500 MG: at 08:39

## 2024-04-29 RX ADMIN — PREGABALIN 100 MG: 100 CAPSULE ORAL at 21:13

## 2024-04-29 RX ADMIN — INSULIN DETEMIR 40 UNITS: 100 INJECTION, SOLUTION SUBCUTANEOUS at 21:13

## 2024-04-29 RX ADMIN — DICLOFENAC SODIUM 4 G: 10 GEL TOPICAL at 11:47

## 2024-04-29 RX ADMIN — FUROSEMIDE 40 MG: 40 TABLET ORAL at 08:39

## 2024-04-29 RX ADMIN — OXYCODONE HYDROCHLORIDE AND ACETAMINOPHEN 1 TABLET: 10; 325 TABLET ORAL at 13:13

## 2024-04-29 RX ADMIN — Medication 10 MG: at 21:13

## 2024-04-29 RX ADMIN — FERROUS SULFATE TAB 325 MG (65 MG ELEMENTAL FE) 325 MG: 325 (65 FE) TAB at 07:59

## 2024-04-29 RX ADMIN — ACETAMINOPHEN 650 MG: 325 TABLET ORAL at 07:08

## 2024-04-29 RX ADMIN — BACLOFEN 10 MG: 10 TABLET ORAL at 21:13

## 2024-04-29 RX ADMIN — BACLOFEN 10 MG: 10 TABLET ORAL at 04:47

## 2024-04-29 RX ADMIN — OXYCODONE AND ACETAMINOPHEN 1 TABLET: 10; 325 TABLET ORAL at 04:47

## 2024-04-29 RX ADMIN — DICLOFENAC SODIUM 4 G: 10 GEL TOPICAL at 07:59

## 2024-04-29 RX ADMIN — LISINOPRIL 2.5 MG: 2.5 TABLET ORAL at 08:39

## 2024-04-29 RX ADMIN — IBUPROFEN 400 MG: 400 TABLET ORAL at 15:08

## 2024-04-29 RX ADMIN — ATORVASTATIN CALCIUM 10 MG: 10 TABLET, FILM COATED ORAL at 21:13

## 2024-04-29 RX ADMIN — ACETAMINOPHEN 650 MG: 325 TABLET ORAL at 02:23

## 2024-04-29 RX ADMIN — INSULIN LISPRO 4 UNITS: 100 INJECTION, SOLUTION INTRAVENOUS; SUBCUTANEOUS at 11:47

## 2024-04-29 RX ADMIN — PREGABALIN 100 MG: 100 CAPSULE ORAL at 08:39

## 2024-04-29 RX ADMIN — BACLOFEN 10 MG: 10 TABLET ORAL at 13:13

## 2024-04-29 RX ADMIN — EMPAGLIFLOZIN 10 MG: 10 TABLET, FILM COATED ORAL at 08:39

## 2024-04-29 RX ADMIN — OXYCODONE HYDROCHLORIDE AND ACETAMINOPHEN 1 TABLET: 10; 325 TABLET ORAL at 21:13

## 2024-04-29 RX ADMIN — DICLOFENAC SODIUM 4 G: 10 GEL TOPICAL at 21:14

## 2024-04-29 RX ADMIN — SERTRALINE HYDROCHLORIDE 50 MG: 50 TABLET ORAL at 21:13

## 2024-04-29 NOTE — THERAPY TREATMENT NOTE
SNF - Occupational Therapy Treatment Note  KE[facility] Dominguez    Patient Name: Jose Shaikh  : 1960    MRN: [id number]                              Today's Date: 2024       Admit Date: 2023    Visit Dx:     ICD-10-CM ICD-9-CM   1. Difficulty in walking  R26.2 719.7   2. Type 2 diabetes mellitus with other specified complication, unspecified whether long term insulin use  E11.69 250.80     Patient Active Problem List   Diagnosis    Diabetic ulcer of left heel associated with type 2 DM    Acute osteomyelitis of left calcaneus     Diabetic ulcer of left heel associated with type 2 DM    Diabetic ulcer of right midfoot associated with type 2 DM    Paroxysmal atrial fibrillation    Essential hypertension    Hyperlipidemia LDL goal <100    Cellulitis and abscess of foot    High alkaline phosphatase level    Osteomyelitis    Onychomycosis    Onychocryptosis    Foot pain, bilateral    Osteomyelitis of foot, right, acute    Cellulitis of right foot    Type 2 diabetes mellitus, with long-term current use of insulin    Class 3 severe obesity due to excess calories with serious comorbidity and body mass index (BMI) of 45.0 to 49.9 in adult    Anxiety disorder, unspecified    Claustrophobia    Dependence on wheelchair    Depression, unspecified    Long term (current) use of anticoagulants    Long term (current) use of oral hypoglycemic drugs    Wound of foot    Ulcer of right foot    Orthostatic hypotension    Other chronic osteomyelitis, right ankle and foot    Personal history of nicotine dependence    Thrombocytopenia, unspecified    Unspecified open wound, right foot, initial encounter    Diabetic foot infection    Subacute osteomyelitis of right foot    Right foot pain    Sepsis    Onychomycosis    Foot pain, left    Impaired mobility and ADLs    Absence of toe of right foot    Corns and callosity    Disability of walking    Fracture    Limb swelling    Polyneuropathy    Pressure ulcer, stage 1    Shortness of  breath    Generalized weakness    Debility    Ulcerated, foot, left, limited to breakdown of skin    Onychomycosis     Past Medical History:   Diagnosis Date    Absence of toe of right foot     Acute osteomyelitis of left calcaneus  08/18/2021    Anxiety and depression     Arthritis     Cancer     Chronic pain     STATES HIS PAIN IS 10/10 AAT    Claustrophobia     Corns and callus     Diabetic ulcer of left heel associated with type 2 DM 08/18/2021    Diabetic ulcer of left heel associated with type 2 DM 07/06/2021    Diabetic ulcer of right midfoot associated with type 2 DM 08/18/2021    Difficulty walking     Essential hypertension 08/31/2021    Hammertoe     Hyperlipidemia LDL goal <100 08/31/2021    Ingrown toenail     Obesity     Paroxysmal atrial fibrillation 08/31/2021    Polyneuropathy     Pressure ulcer, stage 1     Tinea unguium     Type 2 diabetes mellitus with polyneuropathy      Past Surgical History:   Procedure Laterality Date    CYST REMOVAL      center of back; benign    EYE SURGERY      INCISION AND DRAINAGE ABSCESS      back    INCISION AND DRAINAGE LEG Right 12/10/2021    Procedure: INCISION AND DRAINAGE LOWER EXTREMITY;  Surgeon: Ash Leyva DPM;  Location: [facility] OR;  Service: Podiatry;  Laterality: Right;    OTHER SURGICAL HISTORY      Surgical clips left foot    TOE SURGERY Right     Removal of 5th toe    TRANS METATARSAL AMPUTATION Right 12/02/2021    Procedure: AMPUTATION TRANS METATARSAL;  Surgeon: Ash Leyva DPM;  Location: [facility] MAIN OR;  Service: Podiatry;  Laterality: Right;    VASCULAR SURGERY      WRIST SURGERY Left     repair of injury      General Information       Row Name 04/29/24 1257          OT Time and Intention    Document Type therapy note (daily note)  -PG     Mode of Treatment occupational therapy;individual therapy  -PG               User Key  (r) = Recorded By, (t) = Taken By, (c) = Cosigned By      Initials Name Provider Type    PG  Kodak Matos, OT Occupational Therapist                     Mobility/ADL's       Row Name 04/29/24 1257          Bed-Chair Transfer    Bed-Chair [place] (Transfers) moderate assist (50% patient effort);verbal cues;2 person assist;1 person to manage equipment  -PG       Row Name 04/29/24 1257          Sit-Stand Transfer    Sit-Stand [place] (Transfers) moderate assist (50% patient effort);2 person assist;1 person to manage equipment  -PG     Assistive Device (Sit-Stand Transfers) bariatric;walker, front-wheeled  -PG       Row Name 04/29/24 1257          Stand-Sit Transfer    Stand-Sit [place] (Transfers) moderate assist (50% patient effort);2 person assist;1 person assist  -PG     Assistive Device (Stand-Sit Transfers) bariatric;walker, front-wheeled  -PG               User Key  (r) = Recorded By, (t) = Taken By, (c) = Cosigned By      Initials Name Provider Type    PG Kodak Matos OT Occupational Therapist                   Obj/Interventions    No documentation.                  Goals/Plan    No documentation.                  Clinical Impression       Row Name 04/29/24 1258          Plan of Care Review    Progress no change  -PG               User Key  (r) = Recorded By, (t) = Taken By, (c) = Cosigned By      Initials Name Provider Type    PG Kodak Matos, OT Occupational Therapist                   Outcome Measures       Row Name 04/29/24 1245 04/29/24 1100       How much help from another person do you currently need...    Turning from your back to your side while in flat bed without using bedrails? 3  -WM 3  -CR    Moving from lying on back to sitting on the side of a flat bed without bedrails? 3  -WM 3  -CR    Moving to and from a bed to a chair (including a wheelchair)? 1  -WM 1  -CR    Standing up from a chair using your arms (e.g., wheelchair, bedside chair)? 2  -WM 2  -CR    Climbing 3-5 steps with a railing? 1  -WM 1  -CR    To walk in hospital room? 2  -WM 2  -CR    AM-PAC 6 Clicks  Score (PT) 12  -WM 12  -CR    Highest Level of Mobility Goal 4 --> Transfer to chair/commode  -WM 4 --> Transfer to chair/commode  -CR              User Key  (r) = Recorded By, (t) = Taken By, (c) = Cosigned By      Initials Name Provider Type    Adilson Carmona LPN Licensed Nurse    Carson Johnson, [place] Physical Therapist Assistant                  Section G              Section GG  SectionGG: Functional Ability/Goals, Adm  Self Care, Prior Functioning (SP5892Z): 1. Dependent  Upper Extremity Range of Motion (UY0679X): No impairment  Self Care, Admission (Section GG)  Eating: Self-Care Admission Performance (FT4738X2): independent (06)  Oral Hygiene: Self-Care Admission Performance (IO3667C7): independent (06)  Toileting Hygiene: Self-Care Admission Performance (VT1659K3): dependent (01)  Shower/Bathe Self: Self-Care Admission Performance (OE5286R2): substantial/maximal assistance (02)  Upper Body Dressing: Self-Care Admission Performance (JQ6252C8): setup or clean-up assistance (05)  Lower Body Dressing: Self-Care Admission Performance (MI7500G0): dependent (01)  Putting On/Taking Off Footwear: Self-Care Admission Performance (XU8403Z9): dependent (01)  Personal Hygiene: Self-Care Admission Performance (NF0036E2): dependent (01)  Mobility, Admission Performance (DT4045)  Toilet Transfer: Mobility Admission Performance (TC1449B6): not attempted, medical condition/safety concern (88)  Section GG: Functional Ability/Goals, DC  Eating: Self-Care Discharge Goal (OT8883K1): independent (06)  Oral Hygiene: Self-Care Discharge Goal (BR6871R1): independent (06)  Toileting Hygiene: Self-Care Discharge Goal (QO1949T2): partial/moderate assistance (03)  Shower/Bathe Self: Self-Care Discharge Goal (NI4069C6): partial/moderate assistance (03)  Upper Body Dressing: Self-Care Discharge Goal (FF0569P3): independent (06)  Lower Body Dressing: Self-Care Discharge Goal (QY3941T5): partial/moderate assistance (03)  Putting  On/Taking Off Footwear: Self-Care Discharge Goal (FE7556Q8): partial/moderate assistance (03)  Personal Hygiene: Self-Care Discharge Goal (MW9277V3): partial/moderate assistance (03)  Mobility (GG), Discharge Goal (ZF6991)  Toilet Transfer: Mobility Discharge Goal (YN1567X3): supervision or touching assistance (04)          Occupational Therapy Education       Title: PT OT SLP Therapies (Done)       Topic: Occupational Therapy (Done)       Point: ADL training (Done)       Description:   Instruct learner(s) on proper safety adaptation and remediation techniques during self care or transfers.   Instruct in proper use of assistive devices.                  Learning Progress Summary             Patient Acceptance, E,D, DU by PG at 4/24/2024 1039    Acceptance, E,TB, VU by RM at 2/14/2024 0518    Acceptance, E,TB, VU by RM at 1/9/2024 0420    Acceptance, E, VU by CR at 12/30/2023 1750    Acceptance, E,TB, VU by RM at 11/30/2023 0420    Acceptance, E,D, DU by PG at 11/7/2023 0932                         Point: Home exercise program (Done)       Description:   Instruct learner(s) on appropriate technique for monitoring, assisting and/or progressing therapeutic exercises/activities.                  Learning Progress Summary             Patient Acceptance, E,D, DU by PG at 4/24/2024 1039    Acceptance, E,TB, VU by RM at 2/14/2024 0518    Acceptance, E,TB, VU by RM at 1/9/2024 0420    Acceptance, E, VU by CR at 12/30/2023 1750    Acceptance, E,TB, VU by RM at 11/30/2023 0420    Acceptance, E,D, DU by PG at 11/7/2023 0932                         Point: Precautions (Done)       Description:   Instruct learner(s) on prescribed precautions during self-care and functional transfers.                  Learning Progress Summary             Patient Acceptance, E,D, DU by PG at 4/24/2024 1039    Acceptance, E,TB, VU by RM at 2/14/2024 0518    Acceptance, E,TB, VU by RM at 1/9/2024 0420    Acceptance, E, VU by CR at 12/30/2023 1750     Acceptance, E,TB, VU by RM at 11/30/2023 0420    Acceptance, E,D, DU by PG at 11/7/2023 0932                         Point: Body mechanics (Done)       Description:   Instruct learner(s) on proper positioning and spine alignment during self-care, functional mobility activities and/or exercises.                  Learning Progress Summary             Patient Acceptance, E,D, DU by PG at 4/24/2024 1039    Acceptance, E,TB, VU by RM at 2/14/2024 0518    Acceptance, E,TB, VU by RM at 1/9/2024 0420    Acceptance, E, VU by CR at 12/30/2023 1750    Acceptance, E,TB, VU by RM at 11/30/2023 0420    Acceptance, E,D, DU by PG at 11/7/2023 0932                                         User Key       Initials Effective Dates Name Provider Type Discipline     06/08/21 -  Bharath Berg RN Registered Nurse Nurse    CR 06/13/22 -  Adilson Baker LPN Licensed Nurse Nurse    PG 06/16/21 -  Kodak Matos OT Occupational Therapist OT                  OT Recommendation and Plan  Planned Therapy Interventions (OT): activity tolerance training, BADL retraining, transfer/mobility retraining, patient/caregiver education/training, occupation/activity based interventions, strengthening exercise  Therapy Frequency (OT): 5 times/wk  Plan of Care Review  Plan of Care Reviewed With: patient  Progress: no change  Outcome Evaluation: Patient presents with limitations affecting strength, activity tolerance, and balance impacting patient's ability to return home safely and independently.  The skills of a therapist will be required to safely and effectively implement the following treatment plan to restore maximal level of function     Time Calculation:   Evaluation Complexity (OT)  Review Occupational Profile/Medical/Therapy History Complexity: brief/low complexity  Assessment, Occupational Performance/Identification of Deficit Complexity: 3-5 performance deficits  Clinical Decision Making Complexity (OT): problem focused assessment/low  complexity  Overall Complexity of Evaluation (OT): low complexity     Time Calculation- OT       Row Name 04/29/24 1300 04/29/24 1235          Time Calculation- OT    OT Received On 04/29/24  -PG --     OT Goal Re-Cert Due Date 05/03/24  -PG --        Timed Charges    32459 - Gait Training Minutes  -- 5  -WM     58664 - OT Therapeutic Activity Minutes 40  -PG --        SNF Occupational Therapy Minutes    Skilled Minutes- OT 40 min  -PG --        Total Minutes    Timed Charges Total Minutes 40  -PG 5  -WM      Total Minutes 40  -PG 5  -WM               User Key  (r) = Recorded By, (t) = Taken By, (c) = Cosigned By      Initials Name Provider Type     Carson Inman, PTA Physical Therapist Assistant    PG Kodak Matos OT Occupational Therapist                  Therapy Charges for Today       Code Description Service Date Service Provider Modifiers Qty    09390465233 HC OT THERAPEUTIC ACT EA 15 MIN 4/29/2024 Kodak Matos OT GO 3                 Kodak Matos OT  4/29/2024

## 2024-04-29 NOTE — PLAN OF CARE
Goal Outcome Evaluation:  Plan of Care Reviewed With: patient        Progress: no change  Outcome Evaluation: Alert and oriented X 4. Vital signs stable. Able to communicate needs. Continues to use trapeze to reposition self. Refuses every two hour turning and repositiong from staff. Percocet, baclofen, and tylenol administered for pain management. Continue plan of care.

## 2024-04-29 NOTE — PLAN OF CARE
Goal Outcome Evaluation:   Alert and oriented and pleasant with staff. X2-3 assist for transfers and ambulation. X2 admin PRN pain medication, with relief of symptoms noted this shift. Continues to show improving endurance and mobility this shift, ambulated in hallway this shift. Sitting up in bed, call light in reach.

## 2024-04-29 NOTE — THERAPY TREATMENT NOTE
SNF - Physical Therapy Treatment Note  KEO Dominguez     Patient Name: Jose Shaikh  : 1960  MRN: [id number]  Today's Date: 2024      Visit Dx:    ICD-10-CM ICD-9-CM   1. Difficulty in walking  R26.2 719.7   2. Type 2 diabetes mellitus with other specified complication, unspecified whether long term insulin use  E11.69 250.80     Patient Active Problem List   Diagnosis    Diabetic ulcer of left heel associated with type 2 DM    Acute osteomyelitis of left calcaneus     Diabetic ulcer of left heel associated with type 2 DM    Diabetic ulcer of right midfoot associated with type 2 DM    Paroxysmal atrial fibrillation    Essential hypertension    Hyperlipidemia LDL goal <100    Cellulitis and abscess of foot    High alkaline phosphatase level    Osteomyelitis    Onychomycosis    Onychocryptosis    Foot pain, bilateral    Osteomyelitis of foot, right, acute    Cellulitis of right foot    Type 2 diabetes mellitus, with long-term current use of insulin    Class 3 severe obesity due to excess calories with serious comorbidity and body mass index (BMI) of 45.0 to 49.9 in adult    Anxiety disorder, unspecified    Claustrophobia    Dependence on wheelchair    Depression, unspecified    Long term (current) use of anticoagulants    Long term (current) use of oral hypoglycemic drugs    Wound of foot    Ulcer of right foot    Orthostatic hypotension    Other chronic osteomyelitis, right ankle and foot    Personal history of nicotine dependence    Thrombocytopenia, unspecified    Unspecified open wound, right foot, initial encounter    Diabetic foot infection    Subacute osteomyelitis of right foot    Right foot pain    Sepsis    Onychomycosis    Foot pain, left    Impaired mobility and ADLs    Absence of toe of right foot    Corns and callosity    Disability of walking    Fracture    Limb swelling    Polyneuropathy    Pressure ulcer, stage 1    Shortness of breath    Generalized weakness    Debility    Ulcerated, foot,  left, limited to breakdown of skin    Onychomycosis     Past Medical History:   Diagnosis Date    Absence of toe of right foot     Acute osteomyelitis of left calcaneus  08/18/2021    Anxiety and depression     Arthritis     Cancer     Chronic pain     STATES HIS PAIN IS 10/10 AAT    Claustrophobia     Corns and callus     Diabetic ulcer of left heel associated with type 2 DM 08/18/2021    Diabetic ulcer of left heel associated with type 2 DM 07/06/2021    Diabetic ulcer of right midfoot associated with type 2 DM 08/18/2021    Difficulty walking     Essential hypertension 08/31/2021    Hammertoe     Hyperlipidemia LDL goal <100 08/31/2021    Ingrown toenail     Obesity     Paroxysmal atrial fibrillation 08/31/2021    Polyneuropathy     Pressure ulcer, stage 1     Tinea unguium     Type 2 diabetes mellitus with polyneuropathy      Past Surgical History:   Procedure Laterality Date    CYST REMOVAL      center of back; benign    EYE SURGERY      INCISION AND DRAINAGE ABSCESS      back    INCISION AND DRAINAGE LEG Right 12/10/2021    Procedure: INCISION AND DRAINAGE LOWER EXTREMITY;  Surgeon: Ash Leyva DPM;  Location: [facility];  Service: Podiatry;  Laterality: Right;    OTHER SURGICAL HISTORY      Surgical clips left foot    TOE SURGERY Right     Removal of 5th toe    TRANS METATARSAL AMPUTATION Right 12/02/2021    Procedure: AMPUTATION TRANS METATARSAL;  Surgeon: Ash Leyva DPM;  Location: [facility];  Service: Podiatry;  Laterality: Right;    VASCULAR SURGERY      WRIST SURGERY Left     repair of injury       PT Assessment (Last 12 Hours)       PT Evaluation and Treatment       Row Name 04/29/24 1238          Physical Therapy Time and Intention    Document Type therapy note (daily note)  -WM     Mode of Treatment individual therapy;physical therapy  -WM     Patient Effort adequate  -WM     Symptoms Noted During/After Treatment fatigue  -WM       Row Name 04/29/24 1230          Bed  Mobility    Supine-Sit [place] (Bed Mobility) moderate assist (50% patient effort);2 person assist  -     Bed Mobility, Safety Issues decreased use of arms for pushing/pulling;decreased use of legs for bridging/pushing  -     Assistive Device (Bed Mobility) bed rails;head of bed elevated  -       Row Name 04/29/24 1238          Sit-Stand Transfer    Sit-Stand [place] (Transfers) moderate assist (50% patient effort);2 person assist;1 person to manage equipment  -     Assistive Device (Sit-Stand Transfers) bariatric;walker, front-wheeled  -WM       Row Name 04/29/24 1238          Stand-Sit Transfer    Stand-Sit [place] (Transfers) moderate assist (50% patient effort);2 person assist;1 person assist  -     Assistive Device (Stand-Sit Transfers) bariatric;walker, front-wheeled  -WM       Row Name 04/29/24 1238          Gait/Stairs (Locomotion)    [place] Level (Gait) moderate assist (50% patient effort);2 person assist;1 person to manage equipment  -     Assistive Device (Gait) walker, front-wheeled  -     Comment, (Gait/Stairs) Pt was able to advance LLE, but is ubable to weight shift to advance his RLE. He needs assistance to maintain a wide GEOFF.  Pt also has a fear of falling  -       Row Name 04/29/24 1238          Safety Issues, Functional Mobility    Impairments Affecting Function (Mobility) balance;endurance/activity tolerance;strength  -       Row Name 04/29/24 1238          Hip (Therapeutic Exercise)    Hip AROM (Therapeutic Exercise) right;aBduction;aDduction;supine;15 repititions  2 sets  -     Hip AAROM (Therapeutic Exercise) left;aBduction;aDduction;supine;10 repetitions;5 repetitions;2 sets  -     Hip Isometrics (Therapeutic Exercise) bilateral;gluteal sets;supine;10 repetitions;5 repetitions;3 second hold;2 sets  -     Hip Strengthening (Therapeutic Exercise) bilateral;heel slides;supine;30 repititions  -       Row Name 04/29/24 1238          Knee (Therapeutic  Exercise)    Knee Isometrics (Therapeutic Exercise) bilateral;quad sets;supine;10 repetitions;5 repetitions;3 second hold;2 sets  -WM       Row Name 04/29/24 1238          Ankle (Therapeutic Exercise)    Ankle AROM (Therapeutic Exercise) bilateral;dorsiflexion;plantarflexion;supine;30 repititions  -WM       Row Name             Wound 03/27/24 1045 Right anterior Skin Tear    Wound - Properties Group Placement Date: 03/27/24  -CR Placement Time: 1045  -CR Side: Right  -CR Orientation: anterior  -CR Primary Wound Type: Skin tear  -CR    Retired Wound - Properties Group Placement Date: 03/27/24  -CR Placement Time: 1045  -CR Side: Right  -CR Orientation: anterior  -CR Primary Wound Type: Skin tear  -CR    Retired Wound - Properties Group Date first assessed: 03/27/24  -CR Time first assessed: 1045  -CR Side: Right  -CR Primary Wound Type: Skin tear  -CR      Row Name             Wound 03/28/24 0309 Other (See comments) medial coccyx MASD (Moisture associated skin damage)    Wound - Properties Group Placement Date: 03/28/24  -DS Placement Time: 0309  -DS Present on Original Admission: N  -DS Side: Other (See comments)  -DS, medial  Orientation: medial  -DS Location: coccyx  -DS Primary Wound Type: MASD  -DS    Retired Wound - Properties Group Placement Date: 03/28/24  -DS Placement Time: 0309  -DS Present on Original Admission: N  -DS Side: Other (See comments)  -DS, medial  Orientation: medial  -DS Location: coccyx  -DS Primary Wound Type: MASD  -DS    Retired Wound - Properties Group Date first assessed: 03/28/24  -DS Time first assessed: 0309  -DS Present on Original Admission: N  -DS Side: Other (See comments)  -DS, medial  Location: coccyx  -DS Primary Wound Type: MASD  -DS      Row Name             Wound 03/28/24 1225 Right medial ankle Traumatic    Wound - Properties Group Placement Date: 03/28/24  -FP Placement Time: 1225  -FP Side: Right  -FP Orientation: medial  -FP Location: ankle  -FP Primary Wound Type:  Traumatic  -FP    Retired Wound - Properties Group Placement Date: 03/28/24  -FP Placement Time: 1225  -FP Side: Right  -FP Orientation: medial  -FP Location: ankle  -FP Primary Wound Type: Traumatic  -FP    Retired Wound - Properties Group Date first assessed: 03/28/24  -FP Time first assessed: 1225  -FP Side: Right  -FP Location: ankle  -FP Primary Wound Type: Traumatic  -FP      Row Name             Wound 04/03/24 1730 Right anterior hip MASD (Moisture associated skin damage)    Wound - Properties Group Placement Date: 04/03/24  -FL Placement Time: 1730  -FL Side: Right  -FL Orientation: anterior  -FL Location: hip  -FL Primary Wound Type: MASD  -FL    Retired Wound - Properties Group Placement Date: 04/03/24  -FL Placement Time: 1730  -FL Side: Right  -FL Orientation: anterior  -FL Location: hip  -FL Primary Wound Type: MASD  -FL    Retired Wound - Properties Group Date first assessed: 04/03/24  -FL Time first assessed: 1730  -FL Side: Right  -FL Location: hip  -FL Primary Wound Type: MASD  -FL      Row Name 04/29/24 1238          Positioning and Restraints    Pre-Treatment Position in bed  -WM     Post Treatment Position chair  -WM     In Chair reclined;with other staff  -WM       Row Name 04/29/24 1238          Progress Summary (PT)    Progress Toward Functional Goals (PT) progress toward functional goals is gradual  -WM               User Key  (r) = Recorded By, (t) = Taken By, (c) = Cosigned By      Initials Name Provider Type    Linda Hickman, RN Registered Nurse    Bettye Anand RN Registered Nurse    Nicole Elise RN Registered Nurse    Adilson Carmona LPN Licensed Nurse    Carson Johnson PTA Physical Therapist Assistant                  Section G              Section GG                       Physical Therapy Education       Title: PT OT SLP Therapies (Done)       Topic: Physical Therapy (Done)       Point: Mobility training (Done)       Learning Progress Summary              Patient Acceptance, E,D, DU by PG at 4/24/2024 1039    Acceptance, E,TB, VU by RM at 2/14/2024 0518    Acceptance, E,TB, VU by RM at 1/9/2024 0420    Acceptance, E, VU by CR at 12/30/2023 1750    Acceptance, E, VU by CR at 12/20/2023 1345    Acceptance, E, VU by CR at 12/19/2023 1004    Acceptance, E,TB, VU by RM at 11/30/2023 0420    Acceptance, E,TB, VU by MOE at 11/8/2023 1341                         Point: Precautions (Done)       Learning Progress Summary             Patient Acceptance, E,D, DU by PG at 4/24/2024 1039    Acceptance, E,TB, VU by RM at 2/14/2024 0518    Acceptance, E,TB, VU by RM at 1/9/2024 0420    Acceptance, E, VU by CR at 12/30/2023 1750    Acceptance, E,TB, VU by RM at 11/30/2023 0420    Acceptance, E,TB, VU by MOE at 11/8/2023 1341                                         User Key       Initials Effective Dates Name Provider Type Discipline     06/08/21 -  Bharath Berg, RN Registered Nurse Nurse    CR 06/13/22 -  Adilson Baker LPN Licensed Nurse Nurse    PG 06/16/21 -  Kodak Matos OT Occupational Therapist OT    MOE 06/03/21 -  Merlin Rao, PT Physical Therapist PT                  PT Recommendation and Plan     Progress Summary (PT)  Progress Toward Functional Goals (PT): progress toward functional goals is gradual  Daily Progress Summary (PT): Pt participated in BLE exercises in supine. Pt declined tfansfers due to having diarrhea.   Outcome Measures       Row Name 04/29/24 1245 04/28/24 1600 04/27/24 1200       How much help from another person do you currently need...    Turning from your back to your side while in flat bed without using bedrails? 3  -WM 3  -CS 3  -CS    Moving from lying on back to sitting on the side of a flat bed without bedrails? 3  -WM 3  -CS 3  -CS    Moving to and from a bed to a chair (including a wheelchair)? 1  -WM 1  -CS 1  -CS    Standing up from a chair using your arms (e.g., wheelchair, bedside chair)? 2  -WM 2  -CS 2  -CS    Climbing 3-5 steps  with a railing? 1  -WM 1  -CS 1  -CS    To walk in hospital room? 2  -WM 2  -CS 2  -CS    AM-PAC 6 Clicks Score (PT) 12  -WM 12  -CS 12  -CS    Highest Level of Mobility Goal 4 --> Transfer to chair/commode  -WM 4 --> Transfer to chair/commode  -CS 4 --> Transfer to chair/commode  -CS       Functional Assessment    Outcome Measure Options -- AM-PAC 6 Clicks Basic Mobility (PT)  -CS AM-PAC 6 Clicks Basic Mobility (PT)  -CS      Row Name 04/26/24 1420             How much help from another person do you currently need...    Turning from your back to your side while in flat bed without using bedrails? 3  -WM      Moving from lying on back to sitting on the side of a flat bed without bedrails? 3  -WM      Moving to and from a bed to a chair (including a wheelchair)? 1  -WM      Standing up from a chair using your arms (e.g., wheelchair, bedside chair)? 2  -WM      Climbing 3-5 steps with a railing? 1  -WM      To walk in hospital room? 2  -WM      AM-PAC 6 Clicks Score (PT) 12  -WM      Highest Level of Mobility Goal 4 --> Transfer to chair/commode  -WM                User Key  (r) = Recorded By, (t) = Taken By, (c) = Cosigned By      Initials Name Provider Type     Carson Inman PTA Physical Therapist Assistant    Ronald Huerta PTA Physical Therapist Assistant                     Time Calculation:    PT Charges       Row Name 04/29/24 1235             Time Calculation    PT Received On 04/29/24  -WM         Timed Charges    42020 - PT Therapeutic Exercise Minutes 14  -WM      25619 - Gait Training Minutes  5  -WM      35687 - PT Therapeutic Activity Minutes 20  -WM         SNF Physical Therapy Minutes    Skilled Minutes- PT 39 min  -WM         Total Minutes    Timed Charges Total Minutes 39  -WM       Total Minutes 39  -WM                User Key  (r) = Recorded By, (t) = Taken By, (c) = Cosigned By      Initials Name Provider Type     Carson Inmna PTA Physical Therapist Assistant                       PT G-Codes  Outcome Measure Options: AM-PAC 6 Clicks Basic Mobility (PT)  AM-PAC 6 Clicks Score (PT): 12  AM-PAC 6 Clicks Score (OT): 16    Carson Inman, CURTIS  4/29/2024

## 2024-04-30 LAB
GLUCOSE BLDC GLUCOMTR-MCNC: 107 MG/DL (ref 70–99)
GLUCOSE BLDC GLUCOMTR-MCNC: 113 MG/DL (ref 70–99)
GLUCOSE BLDC GLUCOMTR-MCNC: 124 MG/DL (ref 70–99)
GLUCOSE BLDC GLUCOMTR-MCNC: 143 MG/DL (ref 70–99)

## 2024-04-30 PROCEDURE — 97530 THERAPEUTIC ACTIVITIES: CPT

## 2024-04-30 PROCEDURE — 82948 REAGENT STRIP/BLOOD GLUCOSE: CPT | Performed by: INTERNAL MEDICINE

## 2024-04-30 PROCEDURE — 63710000001 INSULIN DETEMIR PER 5 UNITS: Performed by: PHYSICIAN ASSISTANT

## 2024-04-30 PROCEDURE — 97116 GAIT TRAINING THERAPY: CPT

## 2024-04-30 PROCEDURE — 82948 REAGENT STRIP/BLOOD GLUCOSE: CPT

## 2024-04-30 RX ADMIN — SERTRALINE HYDROCHLORIDE 50 MG: 50 TABLET ORAL at 20:12

## 2024-04-30 RX ADMIN — FERROUS SULFATE TAB 325 MG (65 MG ELEMENTAL FE) 325 MG: 325 (65 FE) TAB at 08:18

## 2024-04-30 RX ADMIN — OXYCODONE HYDROCHLORIDE AND ACETAMINOPHEN 1 TABLET: 10; 325 TABLET ORAL at 23:37

## 2024-04-30 RX ADMIN — OXYCODONE HYDROCHLORIDE AND ACETAMINOPHEN 1 TABLET: 10; 325 TABLET ORAL at 15:21

## 2024-04-30 RX ADMIN — INSULIN DETEMIR 40 UNITS: 100 INJECTION, SOLUTION SUBCUTANEOUS at 20:12

## 2024-04-30 RX ADMIN — BACLOFEN 10 MG: 10 TABLET ORAL at 05:41

## 2024-04-30 RX ADMIN — PREGABALIN 100 MG: 100 CAPSULE ORAL at 16:59

## 2024-04-30 RX ADMIN — EMPAGLIFLOZIN 10 MG: 10 TABLET, FILM COATED ORAL at 08:18

## 2024-04-30 RX ADMIN — DICLOFENAC SODIUM 4 G: 10 GEL TOPICAL at 07:39

## 2024-04-30 RX ADMIN — BACLOFEN 10 MG: 10 TABLET ORAL at 23:37

## 2024-04-30 RX ADMIN — DICLOFENAC SODIUM 4 G: 10 GEL TOPICAL at 12:50

## 2024-04-30 RX ADMIN — PREGABALIN 100 MG: 100 CAPSULE ORAL at 20:11

## 2024-04-30 RX ADMIN — APIXABAN 5 MG: 5 TABLET, FILM COATED ORAL at 20:12

## 2024-04-30 RX ADMIN — Medication 500 MG: at 08:18

## 2024-04-30 RX ADMIN — IBUPROFEN 400 MG: 400 TABLET ORAL at 20:11

## 2024-04-30 RX ADMIN — DICLOFENAC SODIUM 4 G: 10 GEL TOPICAL at 20:11

## 2024-04-30 RX ADMIN — FUROSEMIDE 40 MG: 40 TABLET ORAL at 08:17

## 2024-04-30 RX ADMIN — OXYCODONE HYDROCHLORIDE AND ACETAMINOPHEN 1 TABLET: 10; 325 TABLET ORAL at 05:41

## 2024-04-30 RX ADMIN — BACLOFEN 10 MG: 10 TABLET ORAL at 15:21

## 2024-04-30 RX ADMIN — Medication 500 MG: at 20:11

## 2024-04-30 RX ADMIN — CYANOCOBALAMIN TAB 500 MCG 1000 MCG: 500 TAB at 08:17

## 2024-04-30 RX ADMIN — Medication 10 MG: at 20:11

## 2024-04-30 RX ADMIN — LISINOPRIL 2.5 MG: 2.5 TABLET ORAL at 08:17

## 2024-04-30 RX ADMIN — PREGABALIN 100 MG: 100 CAPSULE ORAL at 08:17

## 2024-04-30 RX ADMIN — DICLOFENAC SODIUM 4 G: 10 GEL TOPICAL at 17:02

## 2024-04-30 RX ADMIN — APIXABAN 5 MG: 5 TABLET, FILM COATED ORAL at 08:18

## 2024-04-30 RX ADMIN — ATORVASTATIN CALCIUM 10 MG: 10 TABLET, FILM COATED ORAL at 20:12

## 2024-04-30 RX ADMIN — INSULIN DETEMIR 40 UNITS: 100 INJECTION, SOLUTION SUBCUTANEOUS at 08:18

## 2024-04-30 NOTE — PLAN OF CARE
Goal Outcome Evaluation:           Progress: no change    Outcome Evaluation: Patient is alert and oriented x4, able to make needs known to staff.  Has been medicated x2 this shift with prn oxycodone and baclofen, see emar.  Rsd. states medication effective.  Refuses to use bedpan this shift and has been incontinent of bowels.  Urinal remains at bedside.  Activity encouraged this shift, Rsd. has attempted to pull self up in bed with overhead trapeze bar.  Has not ambulated or gotten out of bed this shift, Refuses to be turned, but does shift weight frequently while in bed.  Refuses bed alert.  Bowels formed this shift.  Call light and personal items within reach, Rsd. reminded to use call light for assistance, verbalizes understanding.  WIll continue to monitor and notify on-coming staff.  Current plan of care remains in place at this time.

## 2024-04-30 NOTE — PLAN OF CARE
Goal Outcome Evaluation:  Plan of Care Reviewed With: patient        Progress: no change  Outcome Evaluation: Alert and oriented x4. Given PRN Baclofen and Percocet at 1521 for c/o left hip pain/spasms. Meds effective. Took a few steps today with therapy and sat up in recliner for over an hour. Two staff members took patient outside for some fresh air while in chair. Patient stated he really enjoyed that. Voices needs. Con't current POC.

## 2024-04-30 NOTE — THERAPY TREATMENT NOTE
Acute Care - Physical Therapy Treatment Note  KEO Dominguez     Patient Name: Jose Shaikh  : 1960  MRN: [id number]  Today's Date: 2024     Admit date: 2023     Referring Physician: Cailin Acosta MD     Surgery Date:* No surgery found *             Visit Dx:     ICD-10-CM ICD-9-CM   1. Difficulty in walking  R26.2 719.7   2. Type 2 diabetes mellitus with other specified complication, unspecified whether long term insulin use  E11.69 250.80     Patient Active Problem List   Diagnosis    Diabetic ulcer of left heel associated with type 2 DM    Acute osteomyelitis of left calcaneus     Diabetic ulcer of left heel associated with type 2 DM    Diabetic ulcer of right midfoot associated with type 2 DM    Paroxysmal atrial fibrillation    Essential hypertension    Hyperlipidemia LDL goal <100    Cellulitis and abscess of foot    High alkaline phosphatase level    Osteomyelitis    Onychomycosis    Onychocryptosis    Foot pain, bilateral    Osteomyelitis of foot, right, acute    Cellulitis of right foot    Type 2 diabetes mellitus, with long-term current use of insulin    Class 3 severe obesity due to excess calories with serious comorbidity and body mass index (BMI) of 45.0 to 49.9 in adult    Anxiety disorder, unspecified    Claustrophobia    Dependence on wheelchair    Depression, unspecified    Long term (current) use of anticoagulants    Long term (current) use of oral hypoglycemic drugs    Wound of foot    Ulcer of right foot    Orthostatic hypotension    Other chronic osteomyelitis, right ankle and foot    Personal history of nicotine dependence    Thrombocytopenia, unspecified    Unspecified open wound, right foot, initial encounter    Diabetic foot infection    Subacute osteomyelitis of right foot    Right foot pain    Sepsis    Onychomycosis    Foot pain, left    Impaired mobility and ADLs    Absence of toe of right foot    Corns and callosity    Disability of walking    Fracture    Limb  swelling    Polyneuropathy    Pressure ulcer, stage 1    Shortness of breath    Generalized weakness    Debility    Ulcerated, foot, left, limited to breakdown of skin    Onychomycosis     Past Medical History:   Diagnosis Date    Absence of toe of right foot     Acute osteomyelitis of left calcaneus  08/18/2021    Anxiety and depression     Arthritis     Cancer     Chronic pain     STATES HIS PAIN IS 10/10 AAT    Claustrophobia     Corns and callus     Diabetic ulcer of left heel associated with type 2 DM 08/18/2021    Diabetic ulcer of left heel associated with type 2 DM 07/06/2021    Diabetic ulcer of right midfoot associated with type 2 DM 08/18/2021    Difficulty walking     Essential hypertension 08/31/2021    Hammertoe     Hyperlipidemia LDL goal <100 08/31/2021    Ingrown toenail     Obesity     Paroxysmal atrial fibrillation 08/31/2021    Polyneuropathy     Pressure ulcer, stage 1     Tinea unguium     Type 2 diabetes mellitus with polyneuropathy      Past Surgical History:   Procedure Laterality Date    CYST REMOVAL      center of back; benign    EYE SURGERY      INCISION AND DRAINAGE ABSCESS      back    INCISION AND DRAINAGE LEG Right 12/10/2021    Procedure: INCISION AND DRAINAGE LOWER EXTREMITY;  Surgeon: Ash Leyva DPM;  Location: [facility];  Service: Podiatry;  Laterality: Right;    OTHER SURGICAL HISTORY      Surgical clips left foot    TOE SURGERY Right     Removal of 5th toe    TRANS METATARSAL AMPUTATION Right 12/02/2021    Procedure: AMPUTATION TRANS METATARSAL;  Surgeon: Ash Leyva DPM;  Location: [facility];  Service: Podiatry;  Laterality: Right;    VASCULAR SURGERY      WRIST SURGERY Left     repair of injury     PT Assessment (Last 12 Hours)       PT Evaluation and Treatment       Row Name 04/30/24 1400          Physical Therapy Time and Intention    Subjective Information complains of;fatigue  -MOE     Document Type therapy note (daily note)  -MOE     Mode  of Treatment individual therapy;physical therapy  -MOE     Patient Effort good  -MOE       Row Name 04/30/24 1400          Bed Mobility    Bed Mobility supine-sit  -MOE     Supine-Sit [place] (Bed Mobility) minimum assist (75% patient effort)  -MOE       Row Name 04/30/24 1400          Transfers    Transfers bed-chair transfer;sit-stand transfer  -MOE       Row Name 04/30/24 1400          Bed-Chair Transfer    Bed-Chair [place] (Transfers) minimum assist (75% patient effort);2 person assist;1 person to manage equipment  -MOE     Assistive Device (Bed-Chair Transfers) walker, front-wheeled  -MOE       Row Name 04/30/24 1400          Sit-Stand Transfer    Sit-Stand [place] (Transfers) minimum assist (75% patient effort);2 person assist;1 person to manage equipment  -MOE     Assistive Device (Sit-Stand Transfers) walker, front-wheeled  -MOE       Row Name 04/30/24 1400          Gait/Stairs (Locomotion)    Gait/Stairs Locomotion gait/ambulation assistive device  -MOE     [place] Level (Gait) moderate assist (50% patient effort)  -MOE     Assistive Device (Gait) walker, front-wheeled  -MOE     Distance in Feet (Gait) 3  Required cueing for narrow GEOFF using a wooden 2x4 for tactile cueing.  -MOE       Row Name 04/30/24 1400          Balance    Dynamic Standing Balance minimal assist;2-person assist;1 person to manage equipment  -MOE       Row Name             Wound 03/27/24 1045 Right anterior Skin Tear    Wound - Properties Group Placement Date: 03/27/24  -CR Placement Time: 1045  -CR Side: Right  -CR Orientation: anterior  -CR Primary Wound Type: Skin tear  -CR    Retired Wound - Properties Group Placement Date: 03/27/24  -CR Placement Time: 1045  -CR Side: Right  -CR Orientation: anterior  -CR Primary Wound Type: Skin tear  -CR    Retired Wound - Properties Group Date first assessed: 03/27/24  -CR Time first assessed: 1045  -CR Side: Right  -CR Primary Wound Type: Skin tear  -CR      Row Name             Wound  03/28/24 0309 Other (See comments) medial coccyx MASD (Moisture associated skin damage)    Wound - Properties Group Placement Date: 03/28/24  -DS Placement Time: 0309  -DS Present on Original Admission: N  -DS Side: Other (See comments)  -DS, medial  Orientation: medial  -DS Location: coccyx  -DS Primary Wound Type: MASD  -DS    Retired Wound - Properties Group Placement Date: 03/28/24  -DS Placement Time: 0309  -DS Present on Original Admission: N  -DS Side: Other (See comments)  -DS, medial  Orientation: medial  -DS Location: coccyx  -DS Primary Wound Type: MASD  -DS    Retired Wound - Properties Group Date first assessed: 03/28/24  -DS Time first assessed: 0309  -DS Present on Original Admission: N  -DS Side: Other (See comments)  -DS, medial  Location: coccyx  -DS Primary Wound Type: MASD  -DS      Row Name             Wound 03/28/24 1225 Right medial ankle Traumatic    Wound - Properties Group Placement Date: 03/28/24  -FP Placement Time: 1225  -FP Side: Right  -FP Orientation: medial  -FP Location: ankle  -FP Primary Wound Type: Traumatic  -FP    Retired Wound - Properties Group Placement Date: 03/28/24  -FP Placement Time: 1225  -FP Side: Right  -FP Orientation: medial  -FP Location: ankle  -FP Primary Wound Type: Traumatic  -FP    Retired Wound - Properties Group Date first assessed: 03/28/24  -FP Time first assessed: 1225  -FP Side: Right  -FP Location: ankle  -FP Primary Wound Type: Traumatic  -FP      Row Name             Wound 04/03/24 1730 Right anterior hip MASD (Moisture associated skin damage)    Wound - Properties Group Placement Date: 04/03/24  -WI Placement Time: 1730  -WI Side: Right  -WI Orientation: anterior  -WI Location: hip  -WI Primary Wound Type: MASD  -WI    Retired Wound - Properties Group Placement Date: 04/03/24  -WI Placement Time: 1730  -WI Side: Right  -WI Orientation: anterior  -WI Location: hip  -WI Primary Wound Type: MASD  -WI    Retired Wound - Properties Group Date first  assessed: 04/03/24  -NM Time first assessed: 1730  -NM Side: Right  -NM Location: hip  -NM Primary Wound Type: MASD  -NM      Row Name 04/30/24 1400          Progress Summary (PT)    Progress Toward Functional Goals (PT) progress toward functional goals is fair  -MOE     Daily Progress Summary (PT) Pt was better able to tolerate ambulation today.  For the first time he was able to advance his RLE without manual assistance.  Will continure current plan  -MOE               User Key  (r) = Recorded By, (t) = Taken By, (c) = Cosigned By      Initials Name Provider Type    DS Linda Damico, RN Registered Nurse    Bettye Anand RN Registered Nurse    Nicole Elise, RN Registered Nurse    Adilson Carmona LPN Licensed Nurse    Merlin De La O, PT Physical Therapist                    Physical Therapy Education       Title: PT OT SLP Therapies (Done)       Topic: Physical Therapy (Done)       Point: Mobility training (Done)       Learning Progress Summary             Patient Acceptance, E,D, DU by PG at 4/24/2024 1039    Acceptance, E,TB, VU by RM at 2/14/2024 0518    Acceptance, E,TB, VU by RM at 1/9/2024 0420    Acceptance, E, VU by CR at 12/30/2023 1750    Acceptance, E, VU by CR at 12/20/2023 1345    Acceptance, E, VU by CR at 12/19/2023 1004    Acceptance, E,TB, VU by RM at 11/30/2023 0420    Acceptance, E,TB, VU by MOE at 11/8/2023 1341                         Point: Precautions (Done)       Learning Progress Summary             Patient Acceptance, E,D, DU by PG at 4/24/2024 1039    Acceptance, E,TB, VU by RM at 2/14/2024 0518    Acceptance, E,TB, VU by RM at 1/9/2024 0420    Acceptance, E, VU by CR at 12/30/2023 1750    Acceptance, E,TB, VU by RM at 11/30/2023 0420    Acceptance, E,TB, VU by MOE at 11/8/2023 1341                                         User Key       Initials Effective Dates Name Provider Type Discipline     06/08/21 -  Bharath Berg, RN Registered Nurse Nurse    ROLY 06/13/22 -   Adilson Baker LPN Licensed Nurse Nurse    PG 06/16/21 -  Koadk Matos OT Occupational Therapist OT    MOE 06/03/21 -  Merlin Rao PT Physical Therapist PT                  PT Recommendation and Plan  Anticipated Discharge Disposition (PT): extended care facility  Planned Therapy Interventions (PT): balance training, bed mobility training, gait training, home exercise program, stair training, strengthening, transfer training  Therapy Frequency (PT): 6 times/wk  Progress Summary (PT)  Progress Toward Functional Goals (PT): progress toward functional goals is fair  Daily Progress Summary (PT): Pt was better able to tolerate ambulation today.  For the first time he was able to advance his RLE without manual assistance.  Will continure current plan  Plan of Care Reviewed With: patient  Progress: improving  Outcome Evaluation: Pt was able to ambulate today with heavy encouragment.  He is being limited by his fear of falling but with prompting pt can ambulate a few feet at a time.  Will attempt ambulating with chair follow tomorrow.   Outcome Measures       Row Name 04/29/24 1245 04/28/24 1600          How much help from another person do you currently need...    Turning from your back to your side while in flat bed without using bedrails? 3  -WM 3  -CS     Moving from lying on back to sitting on the side of a flat bed without bedrails? 3  -WM 3  -CS     Moving to and from a bed to a chair (including a wheelchair)? 1  -WM 1  -CS     Standing up from a chair using your arms (e.g., wheelchair, bedside chair)? 2  -WM 2  -CS     Climbing 3-5 steps with a railing? 1  -WM 1  -CS     To walk in hospital room? 2  -WM 2  -CS     AM-PAC 6 Clicks Score (PT) 12  -WM 12  -CS     Highest Level of Mobility Goal 4 --> Transfer to chair/commode  -WM 4 --> Transfer to chair/commode  -CS        Functional Assessment    Outcome Measure Options -- AM-PAC 6 Clicks Basic Mobility (PT)  -CS               User Key  (r) = Recorded By, (t) =  Taken By, (c) = Cosigned By      Initials Name Provider Type    Carson Johnson, CURTIS Physical Therapist Assistant    Ronald Huerta PTA Physical Therapist Assistant                     Time Calculation:    PT Charges       Row Name 04/30/24 1443             Time Calculation    PT Received On 04/30/24  -MOE         Timed Charges    76308 - Gait Training Minutes  10  -MOE      83135 - PT Therapeutic Activity Minutes 15  -MOE         Total Minutes    Timed Charges Total Minutes 25  -MOE       Total Minutes 25  -MOE                User Key  (r) = Recorded By, (t) = Taken By, (c) = Cosigned By      Initials Name Provider Type    Merlin De La O, MERLIN Physical Therapist                  Therapy Charges for Today       Code Description Service Date Service Provider Modifiers Qty    07404922556 HC PT THERAPEUTIC ACT EA 15 MIN 4/30/2024 Merlin Rao, PT GP 1    79118204455 HC GAIT TRAINING EA 15 MIN 4/30/2024 Merlin Rao, PT GP 1            PT G-Codes  Outcome Measure Options: Optimal Instrument, AM-PAC 6 Clicks Daily Activity (OT)  AM-PAC 6 Clicks Score (PT): 12  AM-PAC 6 Clicks Score (OT): 16    Merlin Rao PT  4/30/2024

## 2024-04-30 NOTE — THERAPY TREATMENT NOTE
Patient Name: Jose Shaikh  : 1960    MRN: [id number]                              Today's Date: 2024       Admit Date: 2023    Visit Dx:     ICD-10-CM ICD-9-CM   1. Difficulty in walking  R26.2 719.7   2. Type 2 diabetes mellitus with other specified complication, unspecified whether long term insulin use  E11.69 250.80     Patient Active Problem List   Diagnosis    Diabetic ulcer of left heel associated with type 2 DM    Acute osteomyelitis of left calcaneus     Diabetic ulcer of left heel associated with type 2 DM    Diabetic ulcer of right midfoot associated with type 2 DM    Paroxysmal atrial fibrillation    Essential hypertension    Hyperlipidemia LDL goal <100    Cellulitis and abscess of foot    High alkaline phosphatase level    Osteomyelitis    Onychomycosis    Onychocryptosis    Foot pain, bilateral    Osteomyelitis of foot, right, acute    Cellulitis of right foot    Type 2 diabetes mellitus, with long-term current use of insulin    Class 3 severe obesity due to excess calories with serious comorbidity and body mass index (BMI) of 45.0 to 49.9 in adult    Anxiety disorder, unspecified    Claustrophobia    Dependence on wheelchair    Depression, unspecified    Long term (current) use of anticoagulants    Long term (current) use of oral hypoglycemic drugs    Wound of foot    Ulcer of right foot    Orthostatic hypotension    Other chronic osteomyelitis, right ankle and foot    Personal history of nicotine dependence    Thrombocytopenia, unspecified    Unspecified open wound, right foot, initial encounter    Diabetic foot infection    Subacute osteomyelitis of right foot    Right foot pain    Sepsis    Onychomycosis    Foot pain, left    Impaired mobility and ADLs    Absence of toe of right foot    Corns and callosity    Disability of walking    Fracture    Limb swelling    Polyneuropathy    Pressure ulcer, stage 1    Shortness of breath    Generalized weakness    Debility    Ulcerated,  foot, left, limited to breakdown of skin    Onychomycosis     Past Medical History:   Diagnosis Date    Absence of toe of right foot     Acute osteomyelitis of left calcaneus  08/18/2021    Anxiety and depression     Arthritis     Cancer     Chronic pain     STATES HIS PAIN IS 10/10 AAT    Claustrophobia     Corns and callus     Diabetic ulcer of left heel associated with type 2 DM 08/18/2021    Diabetic ulcer of left heel associated with type 2 DM 07/06/2021    Diabetic ulcer of right midfoot associated with type 2 DM 08/18/2021    Difficulty walking     Essential hypertension 08/31/2021    Hammertoe     Hyperlipidemia LDL goal <100 08/31/2021    Ingrown toenail     Obesity     Paroxysmal atrial fibrillation 08/31/2021    Polyneuropathy     Pressure ulcer, stage 1     Tinea unguium     Type 2 diabetes mellitus with polyneuropathy      Past Surgical History:   Procedure Laterality Date    CYST REMOVAL      center of back; benign    EYE SURGERY      INCISION AND DRAINAGE ABSCESS      back    INCISION AND DRAINAGE LEG Right 12/10/2021    Procedure: INCISION AND DRAINAGE LOWER EXTREMITY;  Surgeon: Ash Leyva DPM;  Location: [facility];  Service: Podiatry;  Laterality: Right;    OTHER SURGICAL HISTORY      Surgical clips left foot    TOE SURGERY Right     Removal of 5th toe    TRANS METATARSAL AMPUTATION Right 12/02/2021    Procedure: AMPUTATION TRANS METATARSAL;  Surgeon: Ash Leyva DPM;  Location: [facility];  Service: Podiatry;  Laterality: Right;    VASCULAR SURGERY      WRIST SURGERY Left     repair of injury      General Information       Row Name 04/30/24 2284          OT Time and Intention    Document Type therapy note (daily note)  -PG     Mode of Treatment individual therapy;occupational therapy  -PG               User Key  (r) = Recorded By, (t) = Taken By, (c) = Cosigned By      Initials Name Provider Type    PG Kodak Matos, OT Occupational Therapist                      Mobility/ADL's       Row Name 04/30/24 1304          Transfers    Transfers bed-chair transfer  -PG       Row Name 04/30/24 1304          Bed-Chair Transfer    Bed-Chair [place] (Transfers) moderate assist (50% patient effort);verbal cues;2 person assist;1 person to manage equipment  -PG     Assistive Device (Bed-Chair Transfers) bariatric;walker, front-wheeled  -PG               User Key  (r) = Recorded By, (t) = Taken By, (c) = Cosigned By      Initials Name Provider Type    Kodak Velazco OT Occupational Therapist                   Obj/Interventions    No documentation.                  Goals/Plan    No documentation.                  Clinical Impression       Row Name 04/30/24 1304          Plan of Care Review    Progress no change  -PG               User Key  (r) = Recorded By, (t) = Taken By, (c) = Cosigned By      Initials Name Provider Type    Kodak Velazco OT Occupational Therapist                   Outcome Measures       Row Name 04/30/24 1304          How much help from another is currently needed...    Putting on and taking off regular lower body clothing? 1  -PG     Bathing (including washing, rinsing, and drying) 2  -PG     Toileting (which includes using toilet bed pan or urinal) 1  -PG     Putting on and taking off regular upper body clothing 4  -PG     Taking care of personal grooming (such as brushing teeth) 4  -PG     Eating meals 4  -PG     AM-PAC 6 Clicks Score (OT) 16  -PG       [facility] Name 04/30/24 1304          Functional Assessment    Outcome Measure Options Optimal Instrument;AM-PAC 6 Clicks Daily Activity (OT)  -PG       Row Name 04/30/24 1304          Optimal Instrument    Optimal Instrument Optimal - 3  -PG     Bending/Stooping 4  -PG     Standing 4  -PG     Reaching 2  -PG               User Key  (r) = Recorded By, (t) = Taken By, (c) = Cosigned By      Initials Name Provider Type    Kodak Velazco OT Occupational Therapist                    Occupational Therapy Education        Title: PT OT SLP Therapies (Done)       Topic: Occupational Therapy (Done)       Point: ADL training (Done)       Description:   Instruct learner(s) on proper safety adaptation and remediation techniques during self care or transfers.   Instruct in proper use of assistive devices.                  Learning Progress Summary             Patient Acceptance, E,D, DU by PG at 4/24/2024 1039    Acceptance, E,TB, VU by RM at 2/14/2024 0518    Acceptance, E,TB, VU by RM at 1/9/2024 0420    Acceptance, E, VU by CR at 12/30/2023 1750    Acceptance, E,TB, VU by RM at 11/30/2023 0420    Acceptance, E,D, DU by PG at 11/7/2023 0932                         Point: Home exercise program (Done)       Description:   Instruct learner(s) on appropriate technique for monitoring, assisting and/or progressing therapeutic exercises/activities.                  Learning Progress Summary             Patient Acceptance, E,D, DU by PG at 4/24/2024 1039    Acceptance, E,TB, VU by RM at 2/14/2024 0518    Acceptance, E,TB, VU by RM at 1/9/2024 0420    Acceptance, E, VU by CR at 12/30/2023 1750    Acceptance, E,TB, VU by RM at 11/30/2023 0420    Acceptance, E,D, DU by PG at 11/7/2023 0932                         Point: Precautions (Done)       Description:   Instruct learner(s) on prescribed precautions during self-care and functional transfers.                  Learning Progress Summary             Patient Acceptance, E,D, DU by PG at 4/24/2024 1039    Acceptance, E,TB, VU by RM at 2/14/2024 0518    Acceptance, E,TB, VU by RM at 1/9/2024 0420    Acceptance, E, VU by CR at 12/30/2023 1750    Acceptance, E,TB, VU by RM at 11/30/2023 0420    Acceptance, E,D, DU by PG at 11/7/2023 0932                         Point: Body mechanics (Done)       Description:   Instruct learner(s) on proper positioning and spine alignment during self-care, functional mobility activities and/or exercises.                  Learning Progress Summary             Patient  Acceptance, E,D, DU by PG at 4/24/2024 1039    Acceptance, E,TB, VU by  at 2/14/2024 0518    Acceptance, E,TB, VU by  at 1/9/2024 0420    Acceptance, E, VU by CR at 12/30/2023 1750    Acceptance, E,TB, VU by RM at 11/30/2023 0420    Acceptance, E,D, DU by PG at 11/7/2023 0932                                         User Key       Initials Effective Dates Name Provider Type Discipline     06/08/21 -  Bharath Berg, RN Registered Nurse Nurse    CR 06/13/22 -  Adilson Baker LPN Licensed Nurse Nurse    PG 06/16/21 -  Kodak Matos OT Occupational Therapist OT                  OT Recommendation and Plan  Planned Therapy Interventions (OT): activity tolerance training, BADL retraining, transfer/mobility retraining, patient/caregiver education/training, occupation/activity based interventions, strengthening exercise  Therapy Frequency (OT): 5 times/wk  Plan of Care Review  Plan of Care Reviewed With: patient  Progress: no change  Outcome Evaluation: Patient presents with limitations affecting strength, activity tolerance, and balance impacting patient's ability to return home safely and independently.  The skills of a therapist will be required to safely and effectively implement the following treatment plan to restore maximal level of function     Time Calculation:   Evaluation Complexity (OT)  Review Occupational Profile/Medical/Therapy History Complexity: brief/low complexity  Assessment, Occupational Performance/Identification of Deficit Complexity: 3-5 performance deficits  Clinical Decision Making Complexity (OT): problem focused assessment/low complexity  Overall Complexity of Evaluation (OT): low complexity     Time Calculation- OT       Row Name 04/30/24 1305             Time Calculation- OT    OT Received On 04/30/24  -PG      OT Goal Re-Cert Due Date 05/03/24  -PG         Timed Charges    27627 - OT Therapeutic Activity Minutes 40  -PG         SNF Occupational Therapy Minutes    Skilled Minutes- OT 40  min  -PG         Total Minutes    Timed Charges Total Minutes 40  -PG       Total Minutes 40  -PG                User Key  (r) = Recorded By, (t) = Taken By, (c) = Cosigned By      Initials Name Provider Type    PG Kodak Matos OT Occupational Therapist                  Therapy Charges for Today       Code Description Service Date Service Provider Modifiers Qty    41029516508 HC OT THERAPEUTIC ACT EA 15 MIN 4/29/2024 Kodak Matos OT GO 3    58955390046 HC OT THERAPEUTIC ACT EA 15 MIN 4/30/2024 Kodak Matos OT GO 3                 Kodak Matos OT  4/30/2024

## 2024-05-01 LAB
GLUCOSE BLDC GLUCOMTR-MCNC: 114 MG/DL (ref 70–99)
GLUCOSE BLDC GLUCOMTR-MCNC: 123 MG/DL (ref 70–99)
GLUCOSE BLDC GLUCOMTR-MCNC: 146 MG/DL (ref 70–99)
GLUCOSE BLDC GLUCOMTR-MCNC: 96 MG/DL (ref 70–99)

## 2024-05-01 PROCEDURE — 63710000001 INSULIN DETEMIR PER 5 UNITS: Performed by: PHYSICIAN ASSISTANT

## 2024-05-01 PROCEDURE — 82948 REAGENT STRIP/BLOOD GLUCOSE: CPT

## 2024-05-01 PROCEDURE — 82948 REAGENT STRIP/BLOOD GLUCOSE: CPT | Performed by: INTERNAL MEDICINE

## 2024-05-01 PROCEDURE — 63710000001 ONDANSETRON ODT 4 MG TABLET DISPERSIBLE: Performed by: INTERNAL MEDICINE

## 2024-05-01 RX ADMIN — DICLOFENAC SODIUM 4 G: 10 GEL TOPICAL at 12:06

## 2024-05-01 RX ADMIN — PREGABALIN 100 MG: 100 CAPSULE ORAL at 16:22

## 2024-05-01 RX ADMIN — DICLOFENAC SODIUM 4 G: 10 GEL TOPICAL at 08:18

## 2024-05-01 RX ADMIN — ONDANSETRON 4 MG: 4 TABLET, ORALLY DISINTEGRATING ORAL at 08:18

## 2024-05-01 RX ADMIN — ACETAMINOPHEN 650 MG: 325 TABLET ORAL at 04:26

## 2024-05-01 RX ADMIN — DICLOFENAC SODIUM 4 G: 10 GEL TOPICAL at 22:36

## 2024-05-01 RX ADMIN — DICLOFENAC SODIUM 4 G: 10 GEL TOPICAL at 17:58

## 2024-05-01 RX ADMIN — EMPAGLIFLOZIN 10 MG: 10 TABLET, FILM COATED ORAL at 08:18

## 2024-05-01 RX ADMIN — BACLOFEN 10 MG: 10 TABLET ORAL at 07:43

## 2024-05-01 RX ADMIN — LISINOPRIL 2.5 MG: 2.5 TABLET ORAL at 08:18

## 2024-05-01 RX ADMIN — SERTRALINE HYDROCHLORIDE 50 MG: 50 TABLET ORAL at 22:34

## 2024-05-01 RX ADMIN — APIXABAN 5 MG: 5 TABLET, FILM COATED ORAL at 08:18

## 2024-05-01 RX ADMIN — Medication 500 MG: at 08:18

## 2024-05-01 RX ADMIN — APIXABAN 5 MG: 5 TABLET, FILM COATED ORAL at 22:34

## 2024-05-01 RX ADMIN — Medication 500 MG: at 22:34

## 2024-05-01 RX ADMIN — PREGABALIN 100 MG: 100 CAPSULE ORAL at 22:34

## 2024-05-01 RX ADMIN — FUROSEMIDE 40 MG: 40 TABLET ORAL at 08:18

## 2024-05-01 RX ADMIN — OXYCODONE HYDROCHLORIDE AND ACETAMINOPHEN 1 TABLET: 10; 325 TABLET ORAL at 07:43

## 2024-05-01 RX ADMIN — Medication 10 MG: at 22:34

## 2024-05-01 RX ADMIN — FERROUS SULFATE TAB 325 MG (65 MG ELEMENTAL FE) 325 MG: 325 (65 FE) TAB at 08:18

## 2024-05-01 RX ADMIN — PREGABALIN 100 MG: 100 CAPSULE ORAL at 08:18

## 2024-05-01 RX ADMIN — CYANOCOBALAMIN TAB 500 MCG 1000 MCG: 500 TAB at 08:18

## 2024-05-01 RX ADMIN — OXYCODONE HYDROCHLORIDE AND ACETAMINOPHEN 1 TABLET: 10; 325 TABLET ORAL at 17:04

## 2024-05-01 RX ADMIN — ATORVASTATIN CALCIUM 10 MG: 10 TABLET, FILM COATED ORAL at 22:34

## 2024-05-01 RX ADMIN — BACLOFEN 10 MG: 10 TABLET ORAL at 17:04

## 2024-05-01 RX ADMIN — INSULIN DETEMIR 40 UNITS: 100 INJECTION, SOLUTION SUBCUTANEOUS at 08:18

## 2024-05-01 RX ADMIN — INSULIN DETEMIR 40 UNITS: 100 INJECTION, SOLUTION SUBCUTANEOUS at 22:33

## 2024-05-01 NOTE — PLAN OF CARE
Goal Outcome Evaluation:  Plan of Care Reviewed With: patient        Progress: no change  Outcome Evaluation: Aox4, call light and personal items within reach. Able to make needs known. C/o hip and should pain, medication given. Help with postional changes. I/c of bowels, uses bedpan, urinal at beside. Tolorated wound care well. Weight updated in chart. Con't plan of care

## 2024-05-01 NOTE — PLAN OF CARE
Goal Outcome Evaluation:      No acute events overnight. Patient given PRN pain medication for shoulder and hip pain. Patient has had 2 large BM overnight. Patient refused to have moisturizer put on legs.

## 2024-05-02 LAB
GLUCOSE BLDC GLUCOMTR-MCNC: 118 MG/DL (ref 70–99)
GLUCOSE BLDC GLUCOMTR-MCNC: 131 MG/DL (ref 70–99)
GLUCOSE BLDC GLUCOMTR-MCNC: 140 MG/DL (ref 70–99)
GLUCOSE BLDC GLUCOMTR-MCNC: 143 MG/DL (ref 70–99)

## 2024-05-02 PROCEDURE — 97116 GAIT TRAINING THERAPY: CPT

## 2024-05-02 PROCEDURE — 82948 REAGENT STRIP/BLOOD GLUCOSE: CPT | Performed by: INTERNAL MEDICINE

## 2024-05-02 PROCEDURE — 82948 REAGENT STRIP/BLOOD GLUCOSE: CPT

## 2024-05-02 PROCEDURE — 97530 THERAPEUTIC ACTIVITIES: CPT

## 2024-05-02 PROCEDURE — 97110 THERAPEUTIC EXERCISES: CPT

## 2024-05-02 PROCEDURE — 63710000001 INSULIN DETEMIR PER 5 UNITS: Performed by: PHYSICIAN ASSISTANT

## 2024-05-02 RX ADMIN — PREGABALIN 100 MG: 100 CAPSULE ORAL at 08:55

## 2024-05-02 RX ADMIN — ATORVASTATIN CALCIUM 10 MG: 10 TABLET, FILM COATED ORAL at 21:55

## 2024-05-02 RX ADMIN — APIXABAN 5 MG: 5 TABLET, FILM COATED ORAL at 08:55

## 2024-05-02 RX ADMIN — DICLOFENAC SODIUM 4 G: 10 GEL TOPICAL at 21:57

## 2024-05-02 RX ADMIN — BACLOFEN 10 MG: 10 TABLET ORAL at 08:55

## 2024-05-02 RX ADMIN — FERROUS SULFATE TAB 325 MG (65 MG ELEMENTAL FE) 325 MG: 325 (65 FE) TAB at 08:55

## 2024-05-02 RX ADMIN — ACETAMINOPHEN 650 MG: 325 TABLET ORAL at 14:58

## 2024-05-02 RX ADMIN — OXYCODONE HYDROCHLORIDE AND ACETAMINOPHEN 1 TABLET: 10; 325 TABLET ORAL at 00:14

## 2024-05-02 RX ADMIN — LISINOPRIL 2.5 MG: 2.5 TABLET ORAL at 08:55

## 2024-05-02 RX ADMIN — DICLOFENAC SODIUM 4 G: 10 GEL TOPICAL at 08:54

## 2024-05-02 RX ADMIN — Medication 500 MG: at 08:55

## 2024-05-02 RX ADMIN — OXYCODONE HYDROCHLORIDE AND ACETAMINOPHEN 1 TABLET: 10; 325 TABLET ORAL at 18:07

## 2024-05-02 RX ADMIN — Medication 10 MG: at 21:55

## 2024-05-02 RX ADMIN — SERTRALINE HYDROCHLORIDE 50 MG: 50 TABLET ORAL at 21:55

## 2024-05-02 RX ADMIN — Medication: at 00:14

## 2024-05-02 RX ADMIN — FUROSEMIDE 40 MG: 40 TABLET ORAL at 08:55

## 2024-05-02 RX ADMIN — INSULIN DETEMIR 40 UNITS: 100 INJECTION, SOLUTION SUBCUTANEOUS at 08:54

## 2024-05-02 RX ADMIN — EMPAGLIFLOZIN 10 MG: 10 TABLET, FILM COATED ORAL at 08:55

## 2024-05-02 RX ADMIN — BACLOFEN 10 MG: 10 TABLET ORAL at 00:14

## 2024-05-02 RX ADMIN — IBUPROFEN 400 MG: 400 TABLET ORAL at 06:13

## 2024-05-02 RX ADMIN — BACLOFEN 10 MG: 10 TABLET ORAL at 18:07

## 2024-05-02 RX ADMIN — Medication 500 MG: at 21:54

## 2024-05-02 RX ADMIN — INSULIN DETEMIR 40 UNITS: 100 INJECTION, SOLUTION SUBCUTANEOUS at 21:54

## 2024-05-02 RX ADMIN — DICLOFENAC SODIUM 4 G: 10 GEL TOPICAL at 11:51

## 2024-05-02 RX ADMIN — PREGABALIN 100 MG: 100 CAPSULE ORAL at 21:55

## 2024-05-02 RX ADMIN — DICLOFENAC SODIUM 4 G: 10 GEL TOPICAL at 18:07

## 2024-05-02 RX ADMIN — APIXABAN 5 MG: 5 TABLET, FILM COATED ORAL at 21:55

## 2024-05-02 RX ADMIN — ACETAMINOPHEN 650 MG: 325 TABLET ORAL at 05:55

## 2024-05-02 RX ADMIN — PREGABALIN 100 MG: 100 CAPSULE ORAL at 16:50

## 2024-05-02 RX ADMIN — CYANOCOBALAMIN TAB 500 MCG 1000 MCG: 500 TAB at 08:55

## 2024-05-02 RX ADMIN — OXYCODONE HYDROCHLORIDE AND ACETAMINOPHEN 1 TABLET: 10; 325 TABLET ORAL at 08:55

## 2024-05-02 NOTE — PLAN OF CARE
Goal Outcome Evaluation:  Plan of Care Reviewed With: patient        Progress: no change  Outcome Evaluation: Aox4, call light and personal items within reach. Able to make needs known. C/o hip and should pain, medication given. Worked with PT/OT today. Wound care informed about new foot wound, will see tomorrow if able. I/c of bowels, uses bedpan, urinal at beside. Tolorated wound care well. Con't plan of care

## 2024-05-02 NOTE — CONSULTS
"Nutrition Services    Patient Name: Jose Shaikh  YOB: 1960  MRN: [id number]  Admission date: 11/6/2023      CLINICAL NUTRITION ASSESSMENT      Reason for Assessment  Follow Up   H&P:  Past Medical History:   Diagnosis Date    Absence of toe of right foot     Acute osteomyelitis of left calcaneus  08/18/2021    Anxiety and depression     Arthritis     Cancer     Chronic pain     STATES HIS PAIN IS 10/10 AAT    Claustrophobia     Corns and callus     Diabetic ulcer of left heel associated with type 2 DM 08/18/2021    Diabetic ulcer of left heel associated with type 2 DM 07/06/2021    Diabetic ulcer of right midfoot associated with type 2 DM 08/18/2021    Difficulty walking     Essential hypertension 08/31/2021    Hammertoe     Hyperlipidemia LDL goal <100 08/31/2021    Ingrown toenail     Obesity     Paroxysmal atrial fibrillation 08/31/2021    Polyneuropathy     Pressure ulcer, stage 1     Tinea unguium     Type 2 diabetes mellitus with polyneuropathy         Current Problems:   Active Hospital Problems    Diagnosis     **Debility     Ulcerated, foot, left, limited to breakdown of skin     Onychomycosis     Ulcer of right foot         Nutrition/Diet History         Narrative   Pt's intake is adequate, 100% of majority of meals. Pt is weighed weekly to measure wt loss required for hip surgery (on Ozempic). No acute nutrition concerns at this time. RD will continue to monitor per protocol.         Anthropometrics        Current Height, Weight Height: 188 cm (74.02\")  Weight: (!) 156 kg (343 lb 14.7 oz)   Current BMI Body mass index is 44.14 kg/m².   BMI Classification Obese Class III   % %   Adjusted Body Weight (ABW) 112 kg   Weight Hx  Wt Readings from Last 30 Encounters:   05/01/24 1100 (!) 156 kg (343 lb 14.7 oz)   04/24/24 0900 (!) 159 kg (350 lb 1.5 oz)   04/17/24 1124 (!) 161 kg (355 lb 9.6 oz)   04/11/24 1509 (!) 162 kg (356 lb 11.3 oz)   04/02/24 1118 (!) 157 kg (345 lb 0.3 oz) "   03/26/24 1003 (!) 143 kg (314 lb 2.5 oz)   03/18/24 1323 (!) 151 kg (332 lb 3.7 oz)   03/18/24 0500 (!) 171 kg (378 lb 1.4 oz)   03/17/24 0500 (!) 173 kg (380 lb 15.3 oz)   03/16/24 0500 (!) 171 kg (376 lb 15.8 oz)   03/15/24 0500 (!) 161 kg (354 lb 8 oz)   03/14/24 0300 (!) 173 kg (382 lb 4.4 oz)   03/12/24 0500 (!) 158 kg (347 lb 14.2 oz)   03/11/24 0500 (!) 153 kg (337 lb 8.4 oz)   03/10/24 0540 (!) 154 kg (339 lb 4.6 oz)   03/09/24 0500 (!) 153 kg (337 lb 1.3 oz)   03/08/24 1323 (!) 153 kg (337 lb 8 oz)   03/08/24 0500 (!) 157 kg (346 lb 2 oz)   03/06/24 2300 (!) 155 kg (342 lb 2.5 oz)   03/05/24 0415 (!) 155 kg (342 lb 13 oz)   03/04/24 0500 (!) 156 kg (344 lb 9.3 oz)   03/03/24 0500 (!) 156 kg (344 lb 9.3 oz)   03/02/24 0530 (!) 157 kg (346 lb 12.5 oz)   03/01/24 0500 (!) 157 kg (345 lb 3.9 oz)   02/29/24 0500 (!) 157 kg (347 lb 3.6 oz)   02/28/24 0509 (!) 158 kg (347 lb 14.2 oz)   02/27/24 0500 (!) 159 kg (351 lb 3.1 oz)   02/26/24 0500 (!) 159 kg (350 lb 12 oz)   02/25/24 0500 (!) 160 kg (351 lb 10.1 oz)   02/24/24 0500 (!) 160 kg (352 lb 1.2 oz)   02/23/24 0500 (!) 160 kg (353 lb 6.4 oz)   02/22/24 0500 (!) 160 kg (353 lb 13.4 oz)   02/21/24 0514 (!) 162 kg (356 lb 7.7 oz)   02/20/24 0500 (!) 161 kg (355 lb 2.6 oz)   02/19/24 0300 (!) 160 kg (353 lb 6.4 oz)   02/18/24 0500 (!) 162 kg (356 lb 7.7 oz)   02/17/24 0500 (!) 162 kg (357 lb 2.3 oz)   02/16/24 0500 (!) 162 kg (358 lb 0.4 oz)   02/15/24 0532 (!) 163 kg (360 lb 3.7 oz)   02/14/24 0600 (!) 162 kg (357 lb 5.9 oz)   02/13/24 0500 (!) 162 kg (357 lb 9.4 oz)   02/12/24 1352 (!) 160 kg (352 lb 4.7 oz)   02/12/24 0500 (!) 166 kg (367 lb 1.1 oz)   02/11/24 0500 (!) 169 kg (372 lb 2.2 oz)   02/10/24 0500 (!) 168 kg (370 lb 9.5 oz)   02/09/24 0600 (!) 168 kg (370 lb 9.5 oz)   02/08/24 0600 (!) 165 kg (363 lb 15.7 oz)   02/07/24 0600 (!) 166 kg (366 lb 10 oz)   02/06/24 0419 (!) 166 kg (366 lb 10 oz)   02/05/24 0500 (!) 167 kg (367 lb 4.6 oz)   02/04/24 0500  (!) 167 kg (367 lb 8.1 oz)   02/03/24 0500 (!) 166 kg (366 lb 6.5 oz)   02/02/24 0500 (!) 168 kg (369 lb 14.9 oz)   02/01/24 0613 (!) 169 kg (372 lb 5.7 oz)   01/31/24 0500 (!) 168 kg (371 lb 4.1 oz)   01/30/24 0500 (!) 169 kg (372 lb 12.8 oz)   01/29/24 0232 (!) 169 kg (372 lb 5.7 oz)   01/28/24 0500 (!) 167 kg (368 lb 6.2 oz)   01/26/24 0400 (!) 169 kg (372 lb 2.2 oz)   01/25/24 0417 (!) 167 kg (368 lb 9.8 oz)   01/24/24 0608 (!) 168 kg (371 lb 7.6 oz)   01/23/24 0500 (!) 168 kg (369 lb 14.9 oz)   01/22/24 0500 (!) 168 kg (371 lb 4.1 oz)   01/21/24 0500 (!) 168 kg (371 lb 4.1 oz)   01/20/24 0500 (!) 168 kg (371 lb 7.6 oz)   01/19/24 0500 (!) 171 kg (376 lb 1.7 oz)   01/18/24 0500 (!) 172 kg (380 lb 1.2 oz)   01/17/24 0614 (!) 172 kg (379 lb 6.6 oz)   01/16/24 0500 (!) 171 kg (378 lb 1.4 oz)   01/15/24 0500 (!) 169 kg (372 lb 2.2 oz)   01/14/24 0546 (!) 169 kg (373 lb 7.4 oz)   01/13/24 0507 (!) 171 kg (376 lb 5.2 oz)   01/12/24 0600 (!) 170 kg (374 lb 12.5 oz)   01/11/24 0600 (!) 169 kg (371 lb 14.7 oz)   01/10/24 0600 (!) 169 kg (371 lb 11.1 oz)   01/09/24 0500 (!) 168 kg (370 lb 9.5 oz)   01/08/24 0448 (!) 168 kg (370 lb 9.5 oz)   01/07/24 0454 (!) 167 kg (367 lb 15.2 oz)   01/06/24 0500 (!) 166 kg (366 lb 10 oz)   01/05/24 0555 (!) 165 kg (364 lb 6.7 oz)   01/04/24 0500 (!) 165 kg (363 lb 12.1 oz)   01/03/24 0500 (!) 166 kg (365 lb 1.3 oz)   01/02/24 0500 (!) 167 kg (367 lb 4.6 oz)   01/01/24 0500 (!) 166 kg (365 lb 11.9 oz)   12/31/23 0500 (!) 160 kg (352 lb 4.7 oz)   12/30/23 0500 (!) 167 kg (367 lb 15.2 oz)   12/29/23 0500 (!) 166 kg (366 lb 10 oz)   12/28/23 0500 (!) 165 kg (364 lb 13.8 oz)   12/27/23 0500 (!) 166 kg (367 lb 1.1 oz)   12/26/23 0500 (!) 167 kg (367 lb 4.6 oz)   12/25/23 0500 (!) 165 kg (364 lb 3.2 oz)   12/24/23 0500 (!) 164 kg (362 lb 7 oz)   12/23/23 0500 (!) 163 kg (360 lb 0.2 oz)   12/22/23 0600 (!) 163 kg (358 lb 14.5 oz)   12/21/23 0500 (!) 163 kg (359 lb 12.7 oz)   12/20/23 0500 (!)  162 kg (357 lb 9.4 oz)   12/19/23 0550 (!) 163 kg (359 lb 5.6 oz)   12/18/23 0500 (!) 161 kg (355 lb 13.2 oz)   12/17/23 0500 (!) 160 kg (353 lb 13.4 oz)   12/16/23 1541 (!) 160 kg (353 lb 3.2 oz)   12/16/23 0500 (!) 165 kg (364 lb 13.8 oz)   12/15/23 0500 (!) 165 kg (362 lb 10.5 oz)   12/14/23 0500 (!) 157 kg (345 lb 14.4 oz)   12/13/23 0500 (!) 153 kg (337 lb 4.9 oz)   12/12/23 0500 (!) 155 kg (341 lb 0.8 oz)   12/11/23 1049 (!) 155 kg (341 lb 0.8 oz)   12/11/23 0500 (!) 160 kg (353 lb 13.4 oz)   12/10/23 0532 (!) 162 kg (356 lb 7.7 oz)   12/09/23 0500 (!) 151 kg (332 lb 10.8 oz)   12/08/23 0500 (!) 153 kg (336 lb 13.8 oz)   12/06/23 0500 (!) 154 kg (340 lb 6.2 oz)   12/05/23 0607 (!) 161 kg (354 lb 15.1 oz)   12/04/23 0500 (!) 161 kg (354 lb 4.5 oz)   12/03/23 0400 (!) 161 kg (354 lb 11.5 oz)   11/21/23 1155 (!) 162 kg (357 lb 12.8 oz)   11/09/23 0500 (!) 160 kg (353 lb 9.9 oz)   11/06/23 1422 (!) 158 kg (348 lb 5.2 oz)   11/02/23 1155 (!) 158 kg (348 lb 15.8 oz)   09/11/23 0030 (!) 151 kg (334 lb)   09/10/23 1844 (!) 154 kg (338 lb 10 oz)   08/30/23 1112 (!) 157 kg (347 lb)   08/01/23 0932 (!) 158 kg (347 lb 10.7 oz)   07/31/23 2347 (!) 163 kg (358 lb 7.5 oz)   06/16/23 2333 (!) 155 kg (342 lb 2.5 oz)   06/16/23 1053 (!) 155 kg (342 lb 3.2 oz)   06/15/23 0505 (!) 149 kg (328 lb 14.4 oz)   06/14/23 0455 (!) 149 kg (328 lb 4.8 oz)   06/13/23 1703 (!) 149 kg (328 lb 11.2 oz)   06/13/23 0600 (!) 170 kg (374 lb 12.5 oz)   06/12/23 0420 (!) 160 kg (352 lb 11.8 oz)   06/11/23 0447 (!) 150 kg (331 lb 5.6 oz)   06/10/23 0500 (!) 150 kg (330 lb 14.6 oz)   06/09/23 0536 (!) 156 kg (343 lb 7.6 oz)   06/08/23 1826 (!) 156 kg (344 lb 11.2 oz)   06/08/23 0522 (!) 158 kg (348 lb 8.8 oz)   06/07/23 0548 (!) 155 kg (342 lb 9.5 oz)   06/06/23 0515 (!) 155 kg (341 lb 14.9 oz)   06/05/23 0445 (!) 155 kg (341 lb 9.6 oz)   06/05/23 0348 (!) 158 kg (349 lb 3.3 oz)   06/04/23 0555 (!) 157 kg (346 lb 3.2 oz)   06/03/23 0539 (!) 158 kg  (349 lb)   06/02/23 0548 (!) 163 kg (359 lb 3.2 oz)   06/01/23 0600 (!) 164 kg (362 lb)   05/31/23 0507 (!) 164 kg (362 lb 4.8 oz)   05/30/23 0635 (!) 164 kg (362 lb 7 oz)   05/29/23 0500 (!) 167 kg (368 lb 2.7 oz)   05/28/23 0600 (!) 167 kg (367 lb 11.6 oz)   05/27/23 0600 (!) 167 kg (369 lb 0.8 oz)   05/26/23 0529 (!) 167 kg (369 lb 3.2 oz)   05/25/23 0600 (!) 168 kg (370 lb 3.2 oz)   05/24/23 0600 (!) 166 kg (366 lb 9.6 oz)   05/22/23 0529 (!) 169 kg (373 lb 7.4 oz)   05/21/23 0600 (!) 169 kg (371 lb 12.8 oz)   05/20/23 0600 (!) 171 kg (376 lb 5.2 oz)   05/19/23 0300 (!) 168 kg (370 lb 14.4 oz)   05/18/23 1912 (!) 168 kg (371 lb 7.6 oz)   05/18/23 0600 (!) 169 kg (371 lb 11.1 oz)   05/16/23 0700 (!) 171 kg (377 lb 4.8 oz)   05/14/23 0500 (!) 171 kg (377 lb 3.3 oz)   05/12/23 1143 (!) 170 kg (375 lb)   05/06/23 0258 (!) 170 kg (375 lb 8 oz)   04/19/23 0909 (!) 163 kg (359 lb)   04/03/23 1906 (!) 168 kg (370 lb)   03/27/23 0938 (!) 170 kg (373 lb 10.9 oz)   03/17/23 1153 (!) 168 kg (370 lb)   01/27/23 1501 (!) 168 kg (370 lb)   12/22/22 1501 (!) 171 kg (376 lb)   11/08/22 1035 (!) 161 kg (355 lb)   10/01/22 1141 (!) 164 kg (360 lb 10.8 oz)   05/18/22 1311 (!) 155 kg (341 lb)   03/24/22 1432 (!) 155 kg (341 lb)   03/02/22 1412 (!) 155 kg (341 lb)   01/12/22 1317 (!) 155 kg (341 lb)   12/30/21 1431 (!) 155 kg (341 lb)   12/01/21 1144 (!) 155 kg (341 lb 11.4 oz)   12/01/21 0843 (!) 157 kg (346 lb)   11/22/21 0839 (!) 157 kg (346 lb)   11/15/21 1148 (!) 157 kg (346 lb 12.5 oz)   11/09/21 1139 (!) 157 kg (345 lb 7.4 oz)   11/05/21 1130 (!) 159 kg (351 lb 3.1 oz)   11/02/21 1121 (!) 161 kg (356 lb)   10/27/21 1048 (!) 161 kg (356 lb)   10/21/21 1418 (!) 162 kg (356 lb 14.8 oz)          Wt Change Observation Wt loss on Ozempic     Estimated/Assessed Needs  Estimated Needs based on: Adjusted Body Weight       Energy Requirements 25 kcal/kg    EST Needs (kcal/day) 2600       Protein Requirements 1.0-1.2 g/kg   EST Daily  Needs (g/day) 104-125       Fluid Requirements 25 ml/kg    Estimated Needs (mL/day) 2600     Labs/Medications         Pertinent Labs Reviewed.   Results from last 7 days   Lab Units 04/29/24  1050   SODIUM mmol/L 137   POTASSIUM mmol/L 4.2   CHLORIDE mmol/L 102   CO2 mmol/L 24.6   BUN mg/dL 15   CREATININE mg/dL 0.68*   CALCIUM mg/dL 8.3*   GLUCOSE mg/dL 150*           COVID19   Date Value Ref Range Status   03/11/2024 Not Detected Not Detected - Ref. Range Final     Lab Results   Component Value Date    HGBA1C 5.50 01/31/2024         Pertinent Medications Reviewed.     Malnutrition Severity Assessment              Nutrition Diagnosis         Nutrition Dx Problem 1 Increased nutrient needs related to increased protein demand as evidenced by impaired skin integrity.     Nutrition Intervention           Current Nutrition Orders & Evaluation of Intake       Current PO Diet Diet: Diabetic Diets, High Protein Diet; Consistent Carbohydrate; Texture: Regular Texture (IDDSI 7); Fluid Consistency: Thin (IDDSI 0)   Supplement No active supplement orders           Nutrition Intervention/Prescription        High protein diet         Medical Nutrition Therapy/Nutrition Education          Learner     Readiness Patient  Education not indicated at this time     Method     Response N/A  N/A     Monitor/Evaluation        Monitor PO intake, Weight, Skin status     Nutrition Discharge Plan         Continue high protein diet upon discharge      Electronically signed by:  Brigitte Mcclellan RD  05/02/24 08:20 EDT

## 2024-05-02 NOTE — PLAN OF CARE
Goal Outcome Evaluation:   PT A&O able to voice needs and wants CB in reach cont POC

## 2024-05-02 NOTE — THERAPY TREATMENT NOTE
SNF - Physical Therapy Treatment Note  KE[facility] Dominguez     Patient Name: Jose Shaikh  : 1960  MRN: [id number]  Today's Date: 2024      Visit Dx:    ICD-10-CM ICD-9-CM   1. Difficulty in walking  R26.2 719.7   2. Type 2 diabetes mellitus with other specified complication, unspecified whether long term insulin use  E11.69 250.80     Patient Active Problem List   Diagnosis    Diabetic ulcer of left heel associated with type 2 DM    Acute osteomyelitis of left calcaneus     Diabetic ulcer of left heel associated with type 2 DM    Diabetic ulcer of right midfoot associated with type 2 DM    Paroxysmal atrial fibrillation    Essential hypertension    Hyperlipidemia LDL goal <100    Cellulitis and abscess of foot    High alkaline phosphatase level    Osteomyelitis    Onychomycosis    Onychocryptosis    Foot pain, bilateral    Osteomyelitis of foot, right, acute    Cellulitis of right foot    Type 2 diabetes mellitus, with long-term current use of insulin    Class 3 severe obesity due to excess calories with serious comorbidity and body mass index (BMI) of 45.0 to 49.9 in adult    Anxiety disorder, unspecified    Claustrophobia    Dependence on wheelchair    Depression, unspecified    Long term (current) use of anticoagulants    Long term (current) use of oral hypoglycemic drugs    Wound of foot    Ulcer of right foot    Orthostatic hypotension    Other chronic osteomyelitis, right ankle and foot    Personal history of nicotine dependence    Thrombocytopenia, unspecified    Unspecified open wound, right foot, initial encounter    Diabetic foot infection    Subacute osteomyelitis of right foot    Right foot pain    Sepsis    Onychomycosis    Foot pain, left    Impaired mobility and ADLs    Absence of toe of right foot    Corns and callosity    Disability of walking    Fracture    Limb swelling    Polyneuropathy    Pressure ulcer, stage 1    Shortness of breath    Generalized weakness    Debility    Ulcerated, foot,  left, limited to breakdown of skin    Onychomycosis     Past Medical History:   Diagnosis Date    Absence of toe of right foot     Acute osteomyelitis of left calcaneus  08/18/2021    Anxiety and depression     Arthritis     Cancer     Chronic pain     STATES HIS PAIN IS 10/10 AAT    Claustrophobia     Corns and callus     Diabetic ulcer of left heel associated with type 2 DM 08/18/2021    Diabetic ulcer of left heel associated with type 2 DM 07/06/2021    Diabetic ulcer of right midfoot associated with type 2 DM 08/18/2021    Difficulty walking     Essential hypertension 08/31/2021    Hammertoe     Hyperlipidemia LDL goal <100 08/31/2021    Ingrown toenail     Obesity     Paroxysmal atrial fibrillation 08/31/2021    Polyneuropathy     Pressure ulcer, stage 1     Tinea unguium     Type 2 diabetes mellitus with polyneuropathy      Past Surgical History:   Procedure Laterality Date    CYST REMOVAL      center of back; benign    EYE SURGERY      INCISION AND DRAINAGE ABSCESS      back    INCISION AND DRAINAGE LEG Right 12/10/2021    Procedure: INCISION AND DRAINAGE LOWER EXTREMITY;  Surgeon: Ash Leyva DPM;  Location: [facility];  Service: Podiatry;  Laterality: Right;    OTHER SURGICAL HISTORY      Surgical clips left foot    TOE SURGERY Right     Removal of 5th toe    TRANS METATARSAL AMPUTATION Right 12/02/2021    Procedure: AMPUTATION TRANS METATARSAL;  Surgeon: Ash Leyva DPM;  Location: [facility];  Service: Podiatry;  Laterality: Right;    VASCULAR SURGERY      WRIST SURGERY Left     repair of injury       PT Assessment (Last 12 Hours)       PT Evaluation and Treatment       Row Name 05/02/24 4060          Physical Therapy Time and Intention    Document Type therapy note (daily note)  -WM     Mode of Treatment individual therapy;physical therapy  -WM     Patient Effort good  -WM     Symptoms Noted During/After Treatment fatigue  -WM       Row Name 05/02/24 9534          Bed  Mobility    Supine-Sit [place] (Bed Mobility) minimum assist (75% patient effort);1 person assist  -     Bed Mobility, Safety Issues decreased use of legs for bridging/pushing  -     Assistive Device (Bed Mobility) bed rails;head of bed elevated  -       Row Name 05/02/24 1045          Sit-Stand Transfer    Sit-Stand [place] (Transfers) minimum assist (75% patient effort);2 person assist;verbal cues  -     Assistive Device (Sit-Stand Transfers) bariatric;walker, front-wheeled  -       Row Name 05/02/24 1045          Stand-Sit Transfer    Stand-Sit [place] (Transfers) minimum assist (75% patient effort);2 person assist;verbal cues  -     Assistive Device (Stand-Sit Transfers) bariatric;walker, front-wheeled  -       Row Name 05/02/24 1045          Gait/Stairs (Locomotion)    [place] Level (Gait) minimum assist (75% patient effort);2 person assist;verbal cues  -     Assistive Device (Gait) bariatric equipment;walker, front-wheeled  -     Distance in Feet (Gait) --  3 small steps x 2  -WM     Pattern (Gait) 4-point;step-to  -WM     Deviations/Abnormal Patterns (Gait) gait speed decreased;stride length decreased  -WM     Comment, (Gait/Stairs) Pt  takes small steps and is able to advance his RLE without assistance. A  2x4 is used to help maintain wider GEOFF.  -       Row Name 05/02/24 1045          Hip (Therapeutic Exercise)    Hip Strengthening (Therapeutic Exercise) bilateral;heel slides;supine;20 repititions  Manual resistance  -       Row Name 05/02/24 1045          Knee (Therapeutic Exercise)    Knee Isometrics (Therapeutic Exercise) bilateral;quad sets;supine;10 repetitions;3 second hold;2 sets  -       Row Name 05/02/24 1045          Ankle (Therapeutic Exercise)    Ankle AROM (Therapeutic Exercise) bilateral;dorsiflexion;plantarflexion;supine;20 repititions  -       Row Name 05/02/24 1045          Core Strength (Therapeutic Exercise)    Core Strength (Therapeutic  Exercise) supine;10 repetitions;2 sets  Assisted crunches  -WM       Row Name             Wound 03/27/24 1045 Right anterior Skin Tear    Wound - Properties Group Placement Date: 03/27/24  -CR Placement Time: 1045  -CR Side: Right  -CR Orientation: anterior  -CR Primary Wound Type: Skin tear  -CR    Retired Wound - Properties Group Placement Date: 03/27/24  -CR Placement Time: 1045  -CR Side: Right  -CR Orientation: anterior  -CR Primary Wound Type: Skin tear  -CR    Retired Wound - Properties Group Date first assessed: 03/27/24  -CR Time first assessed: 1045  -CR Side: Right  -CR Primary Wound Type: Skin tear  -CR      Row Name             Wound 03/28/24 0309 Other (See comments) medial coccyx MASD (Moisture associated skin damage)    Wound - Properties Group Placement Date: 03/28/24  -DS Placement Time: 0309  -DS Present on Original Admission: N  -DS Side: Other (See comments)  -DS, medial  Orientation: medial  -DS Location: coccyx  -DS Primary Wound Type: MASD  -DS    Retired Wound - Properties Group Placement Date: 03/28/24  -DS Placement Time: 0309  -DS Present on Original Admission: N  -DS Side: Other (See comments)  -DS, medial  Orientation: medial  -DS Location: coccyx  -DS Primary Wound Type: MASD  -DS    Retired Wound - Properties Group Date first assessed: 03/28/24  -DS Time first assessed: 0309  -DS Present on Original Admission: N  -DS Side: Other (See comments)  -DS, medial  Location: coccyx  -DS Primary Wound Type: MASD  -DS      Row Name             Wound 03/28/24 1225 Right medial ankle Traumatic    Wound - Properties Group Placement Date: 03/28/24  -FP Placement Time: 1225  -FP Side: Right  -FP Orientation: medial  -FP Location: ankle  -FP Primary Wound Type: Traumatic  -FP    Retired Wound - Properties Group Placement Date: 03/28/24  -FP Placement Time: 1225  -FP Side: Right  -FP Orientation: medial  -FP Location: ankle  -FP Primary Wound Type: Traumatic  -FP    Retired Wound - Properties Group  Date first assessed: 03/28/24  -FP Time first assessed: 1225  -FP Side: Right  -FP Location: ankle  -FP Primary Wound Type: Traumatic  -FP      Row Name             Wound 04/03/24 1730 Right anterior hip MASD (Moisture associated skin damage)    Wound - Properties Group Placement Date: 04/03/24  -WI Placement Time: 1730  -WI Side: Right  -WI Orientation: anterior  -WI Location: hip  -WI Primary Wound Type: MASD  -WI    Retired Wound - Properties Group Placement Date: 04/03/24  -WI Placement Time: 1730  -WI Side: Right  -WI Orientation: anterior  -WI Location: hip  -WI Primary Wound Type: MASD  -WI    Retired Wound - Properties Group Date first assessed: 04/03/24  -WI Time first assessed: 1730  -WI Side: Right  -WI Location: hip  -WI Primary Wound Type: MASD  -WI      Row Name             Wound 05/02/24 1033 Right posterior plantar    Wound - Properties Group Placement Date: 05/02/24  -AA Placement Time: 1033  -AA Side: Right  -AA Orientation: posterior  -AA Location: plantar  -AA    Retired Wound - Properties Group Placement Date: 05/02/24  -AA Placement Time: 1033  -AA Side: Right  -AA Orientation: posterior  -AA Location: plantar  -AA    Retired Wound - Properties Group Date first assessed: 05/02/24  -AA Time first assessed: 1033  -AA Side: Right  -AA Location: plantar  -AA      Row Name 05/02/24 1045          Positioning and Restraints    Pre-Treatment Position in bed  -WM     Post Treatment Position chair  -WM     In Chair reclined;call light within reach;encouraged to call for assist  -WM       Row Name 05/02/24 1045          Progress Summary (PT)    Progress Toward Functional Goals (PT) progress toward functional goals is good  -WM               User Key  (r) = Recorded By, (t) = Taken By, (c) = Cosigned By      Initials Name Provider Type    DS Linda Damico, RN Registered Nurse    Uyen Stewart, RN Registered Nurse    Bettye Anand, RN Registered Nurse    Nicole Elise RN Registered Nurse     Adilson Carmona LPN Licensed Nurse    Carson Johnson PTA Physical Therapist Assistant                  Section G              Section GG                       Physical Therapy Education       Title: PT OT SLP Therapies (Done)       Topic: Physical Therapy (Done)       Point: Mobility training (Done)       Learning Progress Summary             Patient Acceptance, E,D, DU by PG at 4/24/2024 1039    Acceptance, E,TB, VU by RM at 2/14/2024 0518    Acceptance, E,TB, VU by RM at 1/9/2024 0420    Acceptance, E, VU by CR at 12/30/2023 1750    Acceptance, E, VU by CR at 12/20/2023 1345    Acceptance, E, VU by CR at 12/19/2023 1004    Acceptance, E,TB, VU by RM at 11/30/2023 0420    Acceptance, E,TB, VU by MOE at 11/8/2023 1341                         Point: Precautions (Done)       Learning Progress Summary             Patient Acceptance, E,D, DU by PG at 4/24/2024 1039    Acceptance, E,TB, VU by RM at 2/14/2024 0518    Acceptance, E,TB, VU by RM at 1/9/2024 0420    Acceptance, E, VU by CR at 12/30/2023 1750    Acceptance, E,TB, VU by RM at 11/30/2023 0420    Acceptance, E,TB, VU by MOE at 11/8/2023 1341                                         User Key       Initials Effective Dates Name Provider Type Discipline     06/08/21 -  Bharath Berg RN Registered Nurse Nurse    CR 06/13/22 -  Adilson Baker LPN Licensed Nurse Nurse    PG 06/16/21 -  Kodak Matos OT Occupational Therapist OT    MOE 06/03/21 -  Merlin Rao PT Physical Therapist PT                  PT Recommendation and Plan     Progress Summary (PT)  Progress Toward Functional Goals (PT): progress toward functional goals is good  Daily Progress Summary (PT): Pt participated in BLE exercises in supine. Pt declined tfansfers due to having diarrhea.   Outcome Measures       Row Name 05/02/24 1054 04/29/24 1245          How much help from another person do you currently need...    Turning from your back to your side while in flat bed without using  bedrails? 3  -WM 3  -WM     Moving from lying on back to sitting on the side of a flat bed without bedrails? 2  -WM 3  -WM     Moving to and from a bed to a chair (including a wheelchair)? 2  -WM 1  -WM     Standing up from a chair using your arms (e.g., wheelchair, bedside chair)? 2  -WM 2  -WM     Climbing 3-5 steps with a railing? 1  -WM 1  -WM     To walk in hospital room? 2  -WM 2  -WM     AM-PAC 6 Clicks Score (PT) 12  -WM 12  -WM     Highest Level of Mobility Goal 4 --> Transfer to chair/commode  -WM 4 --> Transfer to chair/commode  -WM               User Key  (r) = Recorded By, (t) = Taken By, (c) = Cosigned By      Initials Name Provider Type    Carson Johnson PTA Physical Therapist Assistant                     Time Calculation:    PT Charges       Row Name 05/02/24 1044             Time Calculation    PT Received On 05/02/24  -WM         Timed Charges    64752 - PT Therapeutic Exercise Minutes 12  -WM      32388 - Gait Training Minutes  10  -WM      14588 - PT Therapeutic Activity Minutes 16  -WM         SNF Physical Therapy Minutes    Skilled Minutes- PT 38 min  -WM         Total Minutes    Timed Charges Total Minutes 38  -WM       Total Minutes 38  -WM                User Key  (r) = Recorded By, (t) = Taken By, (c) = Cosigned By      Initials Name Provider Type    Casron Johnson PTA Physical Therapist Assistant                      PT G-Codes  Outcome Measure Options: Optimal Instrument, AM-PAC 6 Clicks Daily Activity (OT)  AM-PAC 6 Clicks Score (PT): 12  AM-PAC 6 Clicks Score (OT): 16    Carson Inman PTA  5/2/2024

## 2024-05-02 NOTE — THERAPY TREATMENT NOTE
SNF - Occupational Therapy Treatment Note  KE[facility] Dominguez    Patient Name: Jose Shaikh  : 1960    MRN: [id number]                              Today's Date: 2024       Admit Date: 2023    Visit Dx:     ICD-10-CM ICD-9-CM   1. Difficulty in walking  R26.2 719.7   2. Type 2 diabetes mellitus with other specified complication, unspecified whether long term insulin use  E11.69 250.80     Patient Active Problem List   Diagnosis    Diabetic ulcer of left heel associated with type 2 DM    Acute osteomyelitis of left calcaneus     Diabetic ulcer of left heel associated with type 2 DM    Diabetic ulcer of right midfoot associated with type 2 DM    Paroxysmal atrial fibrillation    Essential hypertension    Hyperlipidemia LDL goal <100    Cellulitis and abscess of foot    High alkaline phosphatase level    Osteomyelitis    Onychomycosis    Onychocryptosis    Foot pain, bilateral    Osteomyelitis of foot, right, acute    Cellulitis of right foot    Type 2 diabetes mellitus, with long-term current use of insulin    Class 3 severe obesity due to excess calories with serious comorbidity and body mass index (BMI) of 45.0 to 49.9 in adult    Anxiety disorder, unspecified    Claustrophobia    Dependence on wheelchair    Depression, unspecified    Long term (current) use of anticoagulants    Long term (current) use of oral hypoglycemic drugs    Wound of foot    Ulcer of right foot    Orthostatic hypotension    Other chronic osteomyelitis, right ankle and foot    Personal history of nicotine dependence    Thrombocytopenia, unspecified    Unspecified open wound, right foot, initial encounter    Diabetic foot infection    Subacute osteomyelitis of right foot    Right foot pain    Sepsis    Onychomycosis    Foot pain, left    Impaired mobility and ADLs    Absence of toe of right foot    Corns and callosity    Disability of walking    Fracture    Limb swelling    Polyneuropathy    Pressure ulcer, stage 1    Shortness of  breath    Generalized weakness    Debility    Ulcerated, foot, left, limited to breakdown of skin    Onychomycosis     Past Medical History:   Diagnosis Date    Absence of toe of right foot     Acute osteomyelitis of left calcaneus  08/18/2021    Anxiety and depression     Arthritis     Cancer     Chronic pain     STATES HIS PAIN IS 10/10 AAT    Claustrophobia     Corns and callus     Diabetic ulcer of left heel associated with type 2 DM 08/18/2021    Diabetic ulcer of left heel associated with type 2 DM 07/06/2021    Diabetic ulcer of right midfoot associated with type 2 DM 08/18/2021    Difficulty walking     Essential hypertension 08/31/2021    Hammertoe     Hyperlipidemia LDL goal <100 08/31/2021    Ingrown toenail     Obesity     Paroxysmal atrial fibrillation 08/31/2021    Polyneuropathy     Pressure ulcer, stage 1     Tinea unguium     Type 2 diabetes mellitus with polyneuropathy      Past Surgical History:   Procedure Laterality Date    CYST REMOVAL      center of back; benign    EYE SURGERY      INCISION AND DRAINAGE ABSCESS      back    INCISION AND DRAINAGE LEG Right 12/10/2021    Procedure: INCISION AND DRAINAGE LOWER EXTREMITY;  Surgeon: Ash Leyva DPM;  Location: [facility] OR;  Service: Podiatry;  Laterality: Right;    OTHER SURGICAL HISTORY      Surgical clips left foot    TOE SURGERY Right     Removal of 5th toe    TRANS METATARSAL AMPUTATION Right 12/02/2021    Procedure: AMPUTATION TRANS METATARSAL;  Surgeon: Ash Leyva DPM;  Location: [facility] OR;  Service: Podiatry;  Laterality: Right;    VASCULAR SURGERY      WRIST SURGERY Left     repair of injury      General Information       Row Name 05/02/24 1055          OT Time and Intention    Document Type therapy note (daily note)  -PG     Mode of Treatment individual therapy;occupational therapy  -PG               User Key  (r) = Recorded By, (t) = Taken By, (c) = Cosigned By      Initials Name Provider Type    PG  Kodak Matos, OT Occupational Therapist                     Mobility/ADL's       Row Name 05/02/24 1055          Transfers    Transfers bed-chair transfer;sit-stand transfer;stand-sit transfer  -PG       Row Name 05/02/24 1055          Bed-Chair Transfer    Bed-Chair [place] (Transfers) moderate assist (50% patient effort);verbal cues;2 person assist;1 person to manage equipment  -PG     Assistive Device (Bed-Chair Transfers) walker, front-wheeled  -PG       Row Name 05/02/24 1055          Sit-Stand Transfer    Sit-Stand [place] (Transfers) moderate assist (50% patient effort);2 person assist  -PG     Assistive Device (Sit-Stand Transfers) walker, front-wheeled  -PG               User Key  (r) = Recorded By, (t) = Taken By, (c) = Cosigned By      Initials Name Provider Type    PG Kodak Matos, BEKAH Occupational Therapist                   Obj/Interventions    No documentation.                  Goals/Plan    No documentation.                  Clinical Impression       Row Name 05/02/24 1055          Plan of Care Review    Progress no change  -PG               User Key  (r) = Recorded By, (t) = Taken By, (c) = Cosigned By      Initials Name Provider Type    PG Kodak Matos, BEKAH Occupational Therapist                   Outcome Measures       Row Name 05/02/24 1056          How much help from another is currently needed...    Putting on and taking off regular lower body clothing? 1  -PG     Bathing (including washing, rinsing, and drying) 2  -PG     Toileting (which includes using toilet bed pan or urinal) 1  -PG     Putting on and taking off regular upper body clothing 3  -PG     Taking care of personal grooming (such as brushing teeth) 3  -PG     Eating meals 4  -PG     AM-PAC 6 Clicks Score (OT) 14  -PG       Row Name 05/02/24 1054 05/01/24 2300       How much help from another person do you currently need...    Turning from your back to your side while in flat bed without using bedrails? 3  -WM 3  -DM     Moving from lying on back to sitting on the side of a flat bed without bedrails? 2  -WM 2  -DM    Moving to and from a bed to a chair (including a wheelchair)? 2  -WM 2  -DM    Standing up from a chair using your arms (e.g., wheelchair, bedside chair)? 2  -WM 2  -DM    Climbing 3-5 steps with a railing? 1  -WM 1  -DM    To walk in hospital room? 2  -WM 2  -DM    AM-PAC 6 Clicks Score (PT) 12  -WM 12  -DM    Highest Level of Mobility Goal 4 --> Transfer to chair/commode  -WM 4 --> Transfer to chair/commode  -DM      Row Name 05/02/24 1056          Functional Assessment    Outcome Measure Options AM-PAC 6 Clicks Daily Activity (OT);Optimal Instrument  -PG       Row Name 05/02/24 1056          Optimal Instrument    Optimal Instrument Optimal - 3  -PG     Bending/Stooping 4  -PG     Standing 4  -PG     Reaching 1  -PG               User Key  (r) = Recorded By, (t) = Taken By, (c) = Cosigned By      Initials Name Provider Type    Carson Johnson, PTA Physical Therapist Assistant    PG Kodak Matos, OT Occupational Therapist    Linda Barahona LPN Licensed Nurse                  Section G              Section GG  SectionGG: Functional Ability/Goals, Adm  Self Care, Prior Functioning (JJ2190P): 1. Dependent  Upper Extremity Range of Motion (VV6833L): No impairment  Self Care, Admission (Section GG)  Eating: Self-Care Admission Performance (FO1866U4): independent (06)  Oral Hygiene: Self-Care Admission Performance (XW9361S3): independent (06)  Toileting Hygiene: Self-Care Admission Performance (SV1594N6): dependent (01)  Shower/Bathe Self: Self-Care Admission Performance (OD4780J1): substantial/maximal assistance (02)  Upper Body Dressing: Self-Care Admission Performance (IX6347K3): setup or clean-up assistance (05)  Lower Body Dressing: Self-Care Admission Performance (CA2564U1): dependent (01)  Putting On/Taking Off Footwear: Self-Care Admission Performance (NI6468R8): dependent (01)  Personal Hygiene:  Self-Care Admission Performance (MJ2121V7): dependent (01)  Mobility, Admission Performance (GJ9403)  Toilet Transfer: Mobility Admission Performance (WP0079W9): not attempted, medical condition/safety concern (88)  Section GG: Functional Ability/Goals, DC  Eating: Self-Care Discharge Goal (AX9884N1): independent (06)  Oral Hygiene: Self-Care Discharge Goal (JH8558Z6): independent (06)  Toileting Hygiene: Self-Care Discharge Goal (ZJ4424V3): partial/moderate assistance (03)  Shower/Bathe Self: Self-Care Discharge Goal (VW7254Z2): partial/moderate assistance (03)  Upper Body Dressing: Self-Care Discharge Goal (FT7203Q7): independent (06)  Lower Body Dressing: Self-Care Discharge Goal (JQ6645M5): partial/moderate assistance (03)  Putting On/Taking Off Footwear: Self-Care Discharge Goal (LG6572A9): partial/moderate assistance (03)  Personal Hygiene: Self-Care Discharge Goal (KD6799S2): partial/moderate assistance (03)  Mobility (GG), Discharge Goal (LE3078)  Toilet Transfer: Mobility Discharge Goal (IR0903K0): supervision or touching assistance (04)          Occupational Therapy Education       Title: PT OT SLP Therapies (Done)       Topic: Occupational Therapy (Done)       Point: ADL training (Done)       Description:   Instruct learner(s) on proper safety adaptation and remediation techniques during self care or transfers.   Instruct in proper use of assistive devices.                  Learning Progress Summary             Patient Acceptance, E,D, DU by PG at 4/24/2024 1039    Acceptance, E,TB, VU by RM at 2/14/2024 0518    Acceptance, E,TB, VU by RM at 1/9/2024 0420    Acceptance, E, VU by CR at 12/30/2023 1750    Acceptance, E,TB, VU by RM at 11/30/2023 0420    Acceptance, E,D, DU by PG at 11/7/2023 0932                         Point: Home exercise program (Done)       Description:   Instruct learner(s) on appropriate technique for monitoring, assisting and/or progressing therapeutic exercises/activities.                   Learning Progress Summary             Patient Acceptance, E,D, DU by PG at 4/24/2024 1039    Acceptance, E,TB, VU by RM at 2/14/2024 0518    Acceptance, E,TB, VU by RM at 1/9/2024 0420    Acceptance, E, VU by CR at 12/30/2023 1750    Acceptance, E,TB, VU by RM at 11/30/2023 0420    Acceptance, E,D, DU by PG at 11/7/2023 0932                         Point: Precautions (Done)       Description:   Instruct learner(s) on prescribed precautions during self-care and functional transfers.                  Learning Progress Summary             Patient Acceptance, E,D, DU by PG at 4/24/2024 1039    Acceptance, E,TB, VU by RM at 2/14/2024 0518    Acceptance, E,TB, VU by RM at 1/9/2024 0420    Acceptance, E, VU by CR at 12/30/2023 1750    Acceptance, E,TB, VU by RM at 11/30/2023 0420    Acceptance, E,D, DU by PG at 11/7/2023 0932                         Point: Body mechanics (Done)       Description:   Instruct learner(s) on proper positioning and spine alignment during self-care, functional mobility activities and/or exercises.                  Learning Progress Summary             Patient Acceptance, E,D, DU by PG at 4/24/2024 1039    Acceptance, E,TB, VU by RM at 2/14/2024 0518    Acceptance, E,TB, VU by RM at 1/9/2024 0420    Acceptance, E, VU by CR at 12/30/2023 1750    Acceptance, E,TB, VU by RM at 11/30/2023 0420    Acceptance, E,D, DU by PG at 11/7/2023 0932                                         User Key       Initials Effective Dates Name Provider Type Discipline     06/08/21 -  Bharath Berg, RN Registered Nurse Nurse    CR 06/13/22 -  Adilson Baker LPN Licensed Nurse Nurse    PG 06/16/21 -  Kodak Matos OT Occupational Therapist OT                  OT Recommendation and Plan  Planned Therapy Interventions (OT): activity tolerance training, BADL retraining, transfer/mobility retraining, patient/caregiver education/training, occupation/activity based interventions, strengthening exercise  Therapy  Frequency (OT): 5 times/wk  Plan of Care Review  Plan of Care Reviewed With: patient  Progress: no change  Outcome Evaluation: Patient presents with limitations affecting strength, activity tolerance, and balance impacting patient's ability to return home safely and independently.  The skills of a therapist will be required to safely and effectively implement the following treatment plan to restore maximal level of function     Time Calculation:   Evaluation Complexity (OT)  Review Occupational Profile/Medical/Therapy History Complexity: brief/low complexity  Assessment, Occupational Performance/Identification of Deficit Complexity: 3-5 performance deficits  Clinical Decision Making Complexity (OT): problem focused assessment/low complexity  Overall Complexity of Evaluation (OT): low complexity     Time Calculation- OT       Row Name 05/02/24 1057 05/02/24 1044          Time Calculation- OT    OT Received On 05/02/24  -PG --     OT Goal Re-Cert Due Date 05/03/24  -PG --        Timed Charges    67583 - Gait Training Minutes  -- 10  -WM     10419 - OT Therapeutic Activity Minutes 25  -PG --        SNF Occupational Therapy Minutes    Skilled Minutes- OT 25 min  -PG --        Total Minutes    Timed Charges Total Minutes 25  -PG 10  -WM      Total Minutes 25  -PG 10  -WM               User Key  (r) = Recorded By, (t) = Taken By, (c) = Cosigned By      Initials Name Provider Type    WM Carson Inman PTA Physical Therapist Assistant    PG Kodak Matos OT Occupational Therapist                  Therapy Charges for Today       Code Description Service Date Service Provider Modifiers Qty    [id number] HC OT THERAPEUTIC ACT EA 15 MIN 5/2/2024 Kodak Matos OT GO 2                 Kodak Matos OT  5/2/2024

## 2024-05-03 LAB
GLUCOSE BLDC GLUCOMTR-MCNC: 121 MG/DL (ref 70–99)
GLUCOSE BLDC GLUCOMTR-MCNC: 182 MG/DL (ref 70–99)
GLUCOSE BLDC GLUCOMTR-MCNC: 183 MG/DL (ref 70–99)
GLUCOSE BLDC GLUCOMTR-MCNC: 205 MG/DL (ref 70–99)
GLUCOSE BLDC GLUCOMTR-MCNC: 97 MG/DL (ref 70–99)

## 2024-05-03 PROCEDURE — 82948 REAGENT STRIP/BLOOD GLUCOSE: CPT | Performed by: INTERNAL MEDICINE

## 2024-05-03 PROCEDURE — 97116 GAIT TRAINING THERAPY: CPT

## 2024-05-03 PROCEDURE — 97530 THERAPEUTIC ACTIVITIES: CPT

## 2024-05-03 PROCEDURE — 63710000001 INSULIN LISPRO (HUMAN) PER 5 UNITS: Performed by: INTERNAL MEDICINE

## 2024-05-03 PROCEDURE — 63710000001 INSULIN DETEMIR PER 5 UNITS: Performed by: PHYSICIAN ASSISTANT

## 2024-05-03 PROCEDURE — 82948 REAGENT STRIP/BLOOD GLUCOSE: CPT

## 2024-05-03 PROCEDURE — 97164 PT RE-EVAL EST PLAN CARE: CPT

## 2024-05-03 RX ADMIN — OXYCODONE HYDROCHLORIDE AND ACETAMINOPHEN 1 TABLET: 10; 325 TABLET ORAL at 02:11

## 2024-05-03 RX ADMIN — LISINOPRIL 2.5 MG: 2.5 TABLET ORAL at 09:00

## 2024-05-03 RX ADMIN — IBUPROFEN 400 MG: 400 TABLET ORAL at 12:20

## 2024-05-03 RX ADMIN — SERTRALINE HYDROCHLORIDE 50 MG: 50 TABLET ORAL at 21:52

## 2024-05-03 RX ADMIN — PREGABALIN 100 MG: 100 CAPSULE ORAL at 21:52

## 2024-05-03 RX ADMIN — OXYCODONE HYDROCHLORIDE AND ACETAMINOPHEN 1 TABLET: 10; 325 TABLET ORAL at 19:36

## 2024-05-03 RX ADMIN — INSULIN LISPRO 4 UNITS: 100 INJECTION, SOLUTION INTRAVENOUS; SUBCUTANEOUS at 12:17

## 2024-05-03 RX ADMIN — BACLOFEN 10 MG: 10 TABLET ORAL at 10:29

## 2024-05-03 RX ADMIN — BACLOFEN 10 MG: 10 TABLET ORAL at 02:12

## 2024-05-03 RX ADMIN — IBUPROFEN 400 MG: 400 TABLET ORAL at 01:25

## 2024-05-03 RX ADMIN — OXYCODONE HYDROCHLORIDE AND ACETAMINOPHEN 1 TABLET: 10; 325 TABLET ORAL at 10:29

## 2024-05-03 RX ADMIN — FERROUS SULFATE TAB 325 MG (65 MG ELEMENTAL FE) 325 MG: 325 (65 FE) TAB at 08:55

## 2024-05-03 RX ADMIN — DICLOFENAC SODIUM 4 G: 10 GEL TOPICAL at 12:00

## 2024-05-03 RX ADMIN — INSULIN LISPRO 4 UNITS: 100 INJECTION, SOLUTION INTRAVENOUS; SUBCUTANEOUS at 21:52

## 2024-05-03 RX ADMIN — APIXABAN 5 MG: 5 TABLET, FILM COATED ORAL at 09:01

## 2024-05-03 RX ADMIN — INSULIN DETEMIR 40 UNITS: 100 INJECTION, SOLUTION SUBCUTANEOUS at 09:00

## 2024-05-03 RX ADMIN — DICLOFENAC SODIUM 4 G: 10 GEL TOPICAL at 08:55

## 2024-05-03 RX ADMIN — APIXABAN 5 MG: 5 TABLET, FILM COATED ORAL at 21:53

## 2024-05-03 RX ADMIN — ATORVASTATIN CALCIUM 10 MG: 10 TABLET, FILM COATED ORAL at 21:52

## 2024-05-03 RX ADMIN — PREGABALIN 100 MG: 100 CAPSULE ORAL at 09:00

## 2024-05-03 RX ADMIN — CYANOCOBALAMIN TAB 500 MCG 1000 MCG: 500 TAB at 09:01

## 2024-05-03 RX ADMIN — FUROSEMIDE 40 MG: 40 TABLET ORAL at 09:00

## 2024-05-03 RX ADMIN — Medication 500 MG: at 09:00

## 2024-05-03 RX ADMIN — BACLOFEN 10 MG: 10 TABLET ORAL at 19:44

## 2024-05-03 RX ADMIN — PREGABALIN 100 MG: 100 CAPSULE ORAL at 16:38

## 2024-05-03 RX ADMIN — DICLOFENAC SODIUM 4 G: 10 GEL TOPICAL at 21:52

## 2024-05-03 RX ADMIN — Medication 10 MG: at 21:52

## 2024-05-03 RX ADMIN — INSULIN DETEMIR 40 UNITS: 100 INJECTION, SOLUTION SUBCUTANEOUS at 21:51

## 2024-05-03 RX ADMIN — EMPAGLIFLOZIN 10 MG: 10 TABLET, FILM COATED ORAL at 09:01

## 2024-05-03 RX ADMIN — Medication 500 MG: at 21:52

## 2024-05-03 NOTE — THERAPY TREATMENT NOTE
SNF - Occupational Therapy Treatment Note  KEO Dominguez    Patient Name: Jose Shaikh  : 1960    MRN: [id number]                              Today's Date: 5/3/2024       Admit Date: 2023    Visit Dx:     ICD-10-CM ICD-9-CM   1. Difficulty in walking  R26.2 719.7   2. Type 2 diabetes mellitus with other specified complication, unspecified whether long term insulin use  E11.69 250.80     Patient Active Problem List   Diagnosis    Diabetic ulcer of left heel associated with type 2 DM    Acute osteomyelitis of left calcaneus     Diabetic ulcer of left heel associated with type 2 DM    Diabetic ulcer of right midfoot associated with type 2 DM    Paroxysmal atrial fibrillation    Essential hypertension    Hyperlipidemia LDL goal <100    Cellulitis and abscess of foot    High alkaline phosphatase level    Osteomyelitis    Onychomycosis    Onychocryptosis    Foot pain, bilateral    Osteomyelitis of foot, right, acute    Cellulitis of right foot    Type 2 diabetes mellitus, with long-term current use of insulin    Class 3 severe obesity due to excess calories with serious comorbidity and body mass index (BMI) of 45.0 to 49.9 in adult    Anxiety disorder, unspecified    Claustrophobia    Dependence on wheelchair    Depression, unspecified    Long term (current) use of anticoagulants    Long term (current) use of oral hypoglycemic drugs    Wound of foot    Ulcer of right foot    Orthostatic hypotension    Other chronic osteomyelitis, right ankle and foot    Personal history of nicotine dependence    Thrombocytopenia, unspecified    Unspecified open wound, right foot, initial encounter    Diabetic foot infection    Subacute osteomyelitis of right foot    Right foot pain    Sepsis    Onychomycosis    Foot pain, left    Impaired mobility and ADLs    Absence of toe of right foot    Corns and callosity    Disability of walking    Fracture    Limb swelling    Polyneuropathy    Pressure ulcer, stage 1    Shortness of  breath    Generalized weakness    Debility    Ulcerated, foot, left, limited to breakdown of skin    Onychomycosis     Past Medical History:   Diagnosis Date    Absence of toe of right foot     Acute osteomyelitis of left calcaneus  08/18/2021    Anxiety and depression     Arthritis     Cancer     Chronic pain     STATES HIS PAIN IS 10/10 AAT    Claustrophobia     Corns and callus     Diabetic ulcer of left heel associated with type 2 DM 08/18/2021    Diabetic ulcer of left heel associated with type 2 DM 07/06/2021    Diabetic ulcer of right midfoot associated with type 2 DM 08/18/2021    Difficulty walking     Essential hypertension 08/31/2021    Hammertoe     Hyperlipidemia LDL goal <100 08/31/2021    Ingrown toenail     Obesity     Paroxysmal atrial fibrillation 08/31/2021    Polyneuropathy     Pressure ulcer, stage 1     Tinea unguium     Type 2 diabetes mellitus with polyneuropathy      Past Surgical History:   Procedure Laterality Date    CYST REMOVAL      center of back; benign    EYE SURGERY      INCISION AND DRAINAGE ABSCESS      back    INCISION AND DRAINAGE LEG Right 12/10/2021    Procedure: INCISION AND DRAINAGE LOWER EXTREMITY;  Surgeon: Ash Leyva DPM;  Location: [facility] OR;  Service: Podiatry;  Laterality: Right;    OTHER SURGICAL HISTORY      Surgical clips left foot    TOE SURGERY Right     Removal of 5th toe    TRANS METATARSAL AMPUTATION Right 12/02/2021    Procedure: AMPUTATION TRANS METATARSAL;  Surgeon: Ash Leyva DPM;  Location: [facility] OR;  Service: Podiatry;  Laterality: Right;    VASCULAR SURGERY      WRIST SURGERY Left     repair of injury      General Information       Row Name 05/03/24 1050          OT Time and Intention    Document Type re-evaluation  -PG     Mode of Treatment individual therapy;occupational therapy  -PG               User Key  (r) = Recorded By, (t) = Taken By, (c) = Cosigned By      Initials Name Provider Type    PG Kodak Matos, BEKAH  Occupational Therapist                     Mobility/ADL's       Row Name 05/03/24 1053          Transfers    Transfers toilet transfer  -PG       Row Name 05/03/24 1053          Toilet Transfer    Type (Toilet Transfer) stand-sit;sit-stand  -PG     [place] Level (Toilet Transfer) moderate assist (50% patient effort);2 person assist  -PG     Assistive Device (Toilet Transfer) commode, 3-in-1  -PG               User Key  (r) = Recorded By, (t) = Taken By, (c) = Cosigned By      Initials Name Provider Type    Kodak Velazco, OT Occupational Therapist                   Obj/Interventions    No documentation.                  Goals/Plan       Row Name 05/03/24 1056          Transfer Goal 1 (OT)    Progress/Outcome (Transfer Goal 1, OT) continuing progress toward goal  -PG       Row Name 05/03/24 1056          Bathing Goal 1 (OT)    Progress/Outcomes (Bathing Goal 1, OT) continuing progress toward goal  -PG       Row Name 05/03/24 1056          Dressing Goal 1 (OT)    Progress/Outcome (Dressing Goal 1, OT) continuing progress toward goal  -PG       Row Name 05/03/24 1056          Toileting Goal 1 (OT)    Progress/Outcome (Toileting Goal 1, OT) continuing progress toward goal  -PG       Row Name 05/03/24 1056          Grooming Goal 1 (OT)    Progress/Outcome (Grooming Goal 1, OT) continuing progress toward goal  -PG       Row Name 05/03/24 1056          Strength Goal 1 (OT)    Progress/Outcome (Strength Goal 1, OT) continuing progress toward goal  -PG       Row Name 05/03/24 1056          Problem Specific Goal 1 (OT)    Progress/Outcome (Problem Specific Goal 1, OT) continuing progress toward goal  -PG               User Key  (r) = Recorded By, (t) = Taken By, (c) = Cosigned By      Initials Name Provider Type    Kodak Velazco, OT Occupational Therapist                   Clinical Impression       Row Name 05/03/24 1054          Plan of Care Review    Outcome Evaluation Pt now transferring to [facility] with mod A x 2.   Continue OT services per plan of care  -PG               User Key  (r) = Recorded By, (t) = Taken By, (c) = Cosigned By      Initials Name Provider Type    Kodak Velazco OT Occupational Therapist                   Outcome Measures       Row Name 05/03/24 1057          How much help from another is currently needed...    Putting on and taking off regular lower body clothing? 1  -PG     Bathing (including washing, rinsing, and drying) 2  -PG     Toileting (which includes using toilet bed pan or urinal) 1  -PG     Putting on and taking off regular upper body clothing 4  -PG     Taking care of personal grooming (such as brushing teeth) 4  -PG     Eating meals 4  -PG     AM-PAC 6 Clicks Score (OT) 16  -PG       Row Name 05/03/24 1057          Functional Assessment    Outcome Measure Options AM-PAC 6 Clicks Daily Activity (OT);Optimal Instrument  -PG       Row Name 05/03/24 1057          Optimal Instrument    Optimal Instrument Optimal - 3  -PG     Bending/Stooping 4  -PG     Standing 4  -PG     Reaching 2  -PG               User Key  (r) = Recorded By, (t) = Taken By, (c) = Cosigned By      Initials Name Provider Type    Kodak Velazco OT Occupational Therapist                  Section G              Section GG  SectionGG: Functional Ability/Goals, Adm  Self Care, Prior Functioning (WD2955H): 1. Dependent  Upper Extremity Range of Motion (RT1254X): No impairment  Self Care, Admission (Section GG)  Eating: Self-Care Admission Performance (XH7810H3): independent (06)  Oral Hygiene: Self-Care Admission Performance (RM0848X6): independent (06)  Toileting Hygiene: Self-Care Admission Performance (GL8933E0): dependent (01)  Shower/Bathe Self: Self-Care Admission Performance (OB9491Z6): substantial/maximal assistance (02)  Upper Body Dressing: Self-Care Admission Performance (SN8000C5): setup or clean-up assistance (05)  Lower Body Dressing: Self-Care Admission Performance (ZI6967S7): dependent (01)  Putting On/Taking  Off Footwear: Self-Care Admission Performance (OH5588G4): dependent (01)  Personal Hygiene: Self-Care Admission Performance (AN7329T0): dependent (01)  Mobility, Admission Performance (VM9378)  Toilet Transfer: Mobility Admission Performance (CX7210J6): not attempted, medical condition/safety concern (88)  Section GG: Functional Ability/Goals, DC  Eating: Self-Care Discharge Goal (GX2455H1): independent (06)  Oral Hygiene: Self-Care Discharge Goal (WW4994K8): independent (06)  Toileting Hygiene: Self-Care Discharge Goal (VO7742B1): partial/moderate assistance (03)  Shower/Bathe Self: Self-Care Discharge Goal (VW7767J4): partial/moderate assistance (03)  Upper Body Dressing: Self-Care Discharge Goal (CA3540K3): independent (06)  Lower Body Dressing: Self-Care Discharge Goal (JM6824V7): partial/moderate assistance (03)  Putting On/Taking Off Footwear: Self-Care Discharge Goal (AL4322V9): partial/moderate assistance (03)  Personal Hygiene: Self-Care Discharge Goal (OT7584L9): partial/moderate assistance (03)  Mobility (GG), Discharge Goal (SN1102)  Toilet Transfer: Mobility Discharge Goal (XF5919G4): supervision or touching assistance (04)          Occupational Therapy Education       Title: PT OT SLP Therapies (Done)       Topic: Occupational Therapy (Done)       Point: ADL training (Done)       Description:   Instruct learner(s) on proper safety adaptation and remediation techniques during self care or transfers.   Instruct in proper use of assistive devices.                  Learning Progress Summary             Patient Acceptance, E,D, DU by PG at 4/24/2024 1039    Acceptance, E,TB, VU by RM at 2/14/2024 0518    Acceptance, E,TB, VU by RM at 1/9/2024 0420    Acceptance, E, VU by CR at 12/30/2023 1750    Acceptance, E,TB, VU by RM at 11/30/2023 0420    Acceptance, E,D, DU by PG at 11/7/2023 0932                         Point: Home exercise program (Done)       Description:   Instruct learner(s) on appropriate  technique for monitoring, assisting and/or progressing therapeutic exercises/activities.                  Learning Progress Summary             Patient Acceptance, E,D, DU by PG at 4/24/2024 1039    Acceptance, E,TB, VU by RM at 2/14/2024 0518    Acceptance, E,TB, VU by RM at 1/9/2024 0420    Acceptance, E, VU by CR at 12/30/2023 1750    Acceptance, E,TB, VU by RM at 11/30/2023 0420    Acceptance, E,D, DU by PG at 11/7/2023 0932                         Point: Precautions (Done)       Description:   Instruct learner(s) on prescribed precautions during self-care and functional transfers.                  Learning Progress Summary             Patient Acceptance, E,D, DU by PG at 4/24/2024 1039    Acceptance, E,TB, VU by RM at 2/14/2024 0518    Acceptance, E,TB, VU by RM at 1/9/2024 0420    Acceptance, E, VU by CR at 12/30/2023 1750    Acceptance, E,TB, VU by RM at 11/30/2023 0420    Acceptance, E,D, DU by PG at 11/7/2023 0932                         Point: Body mechanics (Done)       Description:   Instruct learner(s) on proper positioning and spine alignment during self-care, functional mobility activities and/or exercises.                  Learning Progress Summary             Patient Acceptance, E,D, DU by PG at 4/24/2024 1039    Acceptance, E,TB, VU by RM at 2/14/2024 0518    Acceptance, E,TB, VU by RM at 1/9/2024 0420    Acceptance, E, VU by CR at 12/30/2023 1750    Acceptance, E,TB, VU by RM at 11/30/2023 0420    Acceptance, E,D, DU by PG at 11/7/2023 0932                                         User Key       Initials Effective Dates Name Provider Type Discipline     06/08/21 -  Bharath Berg, RN Registered Nurse Nurse    CR 06/13/22 -  Adilson Baker LPN Licensed Nurse Nurse    PG 06/16/21 -  Kodak Matos OT Occupational Therapist OT                  OT Recommendation and Plan  Planned Therapy Interventions (OT): activity tolerance training, BADL retraining, transfer/mobility retraining, patient/caregiver  education/training, occupation/activity based interventions, strengthening exercise  Therapy Frequency (OT): 5 times/wk  Plan of Care Review  Plan of Care Reviewed With: patient  Progress: no change  Outcome Evaluation: [place] [facility] [place].  Continue OT services per plan of care     Time Calculation:   Evaluation Complexity (OT)  Review Occupational Profile/Medical/Therapy History Complexity: brief/low complexity  Assessment, Occupational Performance/Identification of Deficit Complexity: 3-5 performance deficits  Clinical Decision Making Complexity (OT): problem focused assessment/low complexity  Overall Complexity of Evaluation (OT): low complexity     Time Calculation- OT       Row Name 05/03/24 1058             Time Calculation- OT    OT Received On 05/03/24  -PG      OT Goal Re-Cert Due Date 05/12/24  -PG         Timed Charges    57135 - OT Therapeutic Activity Minutes 30  -PG         SNF Occupational Therapy Minutes    Skilled Minutes- OT 30 min  -PG         Total Minutes    Timed Charges Total Minutes 30  -PG       Total Minutes 30  -PG                User Key  (r) = Recorded By, (t) = Taken By, (c) = Cosigned By      Initials Name Provider Type    PG Kodak Matos OT Occupational Therapist                  Therapy Charges for Today       Code Description Service Date Service Provider Modifiers Qty    47102866192 HC OT THERAPEUTIC ACT EA 15 MIN 5/2/2024 Kodak Matos OT GO 2    51570570247 HC OT THERAPEUTIC ACT EA 15 MIN 5/3/2024 Kodak Matos OT GO 2                 Kodak Matos OT  5/3/2024

## 2024-05-03 NOTE — PLAN OF CARE
Goal Outcome Evaluation:  Plan of Care Reviewed With: patient AOX4, makes needs known to staff. Blood sugar    140 no supplemental insulin required. No changes in status. Call light and personal items at bedside.

## 2024-05-03 NOTE — THERAPY TREATMENT NOTE
SNF - Physical Therapy Treatment Note  KEO Dominguez     Patient Name: Jose Shaikh  : 1960  MRN: [id number]  Today's Date: 5/3/2024      Visit Dx:    ICD-10-CM ICD-9-CM   1. Difficulty in walking  R26.2 719.7   2. Type 2 diabetes mellitus with other specified complication, unspecified whether long term insulin use  E11.69 250.80     Patient Active Problem List   Diagnosis    Diabetic ulcer of left heel associated with type 2 DM    Acute osteomyelitis of left calcaneus     Diabetic ulcer of left heel associated with type 2 DM    Diabetic ulcer of right midfoot associated with type 2 DM    Paroxysmal atrial fibrillation    Essential hypertension    Hyperlipidemia LDL goal <100    Cellulitis and abscess of foot    High alkaline phosphatase level    Osteomyelitis    Onychomycosis    Onychocryptosis    Foot pain, bilateral    Osteomyelitis of foot, right, acute    Cellulitis of right foot    Type 2 diabetes mellitus, with long-term current use of insulin    Class 3 severe obesity due to excess calories with serious comorbidity and body mass index (BMI) of 45.0 to 49.9 in adult    Anxiety disorder, unspecified    Claustrophobia    Dependence on wheelchair    Depression, unspecified    Long term (current) use of anticoagulants    Long term (current) use of oral hypoglycemic drugs    Wound of foot    Ulcer of right foot    Orthostatic hypotension    Other chronic osteomyelitis, right ankle and foot    Personal history of nicotine dependence    Thrombocytopenia, unspecified    Unspecified open wound, right foot, initial encounter    Diabetic foot infection    Subacute osteomyelitis of right foot    Right foot pain    Sepsis    Onychomycosis    Foot pain, left    Impaired mobility and ADLs    Absence of toe of right foot    Corns and callosity    Disability of walking    Fracture    Limb swelling    Polyneuropathy    Pressure ulcer, stage 1    Shortness of breath    Generalized weakness    Debility    Ulcerated, foot,  left, limited to breakdown of skin    Onychomycosis     Past Medical History:   Diagnosis Date    Absence of toe of right foot     Acute osteomyelitis of left calcaneus  08/18/2021    Anxiety and depression     Arthritis     Cancer     Chronic pain     STATES HIS PAIN IS 10/10 AAT    Claustrophobia     Corns and callus     Diabetic ulcer of left heel associated with type 2 DM 08/18/2021    Diabetic ulcer of left heel associated with type 2 DM 07/06/2021    Diabetic ulcer of right midfoot associated with type 2 DM 08/18/2021    Difficulty walking     Essential hypertension 08/31/2021    Hammertoe     Hyperlipidemia LDL goal <100 08/31/2021    Ingrown toenail     Obesity     Paroxysmal atrial fibrillation 08/31/2021    Polyneuropathy     Pressure ulcer, stage 1     Tinea unguium     Type 2 diabetes mellitus with polyneuropathy      Past Surgical History:   Procedure Laterality Date    CYST REMOVAL      center of back; benign    EYE SURGERY      INCISION AND DRAINAGE ABSCESS      back    INCISION AND DRAINAGE LEG Right 12/10/2021    Procedure: INCISION AND DRAINAGE LOWER EXTREMITY;  Surgeon: Ash Leyva DPM;  Location: [facility];  Service: Podiatry;  Laterality: Right;    OTHER SURGICAL HISTORY      Surgical clips left foot    TOE SURGERY Right     Removal of 5th toe    TRANS METATARSAL AMPUTATION Right 12/02/2021    Procedure: AMPUTATION TRANS METATARSAL;  Surgeon: Ash Leyva DPM;  Location: [facility];  Service: Podiatry;  Laterality: Right;    VASCULAR SURGERY      WRIST SURGERY Left     repair of injury       PT Assessment (Last 12 Hours)       PT Evaluation and Treatment       Row Name 05/03/24 1221          Physical Therapy Time and Intention    Document Type therapy note (daily note)  -WM     Mode of Treatment individual therapy;physical therapy  -WM     Patient Effort adequate  -WM     Symptoms Noted During/After Treatment fatigue  -WM       Row Name 05/03/24 7037          Bed  Mobility    Supine-Sit [place] (Bed Mobility) minimum assist (75% patient effort);1 person assist;verbal cues  -WM     Bed Mobility, Safety Issues decreased use of legs for bridging/pushing  -     Assistive Device (Bed Mobility) overhead trapeze;bed rails  -       Row Name 05/03/24 1227          Sit-Stand Transfer    Sit-Stand [place] (Transfers) moderate assist (50% patient effort);2 person assist;verbal cues  -WM     Assistive Device (Sit-Stand Transfers) bariatric;walker, front-wheeled  -WM       Row Name 05/03/24 1227          Stand-Sit Transfer    Stand-Sit [place] (Transfers) moderate assist (50% patient effort);2 person assist;verbal cues  -WM       Row Name 05/03/24 1227          Toilet Transfer    [place] Level (Toilet Transfer) moderate assist (50% patient effort);2 person assist;verbal cues  -WM     Assistive Device (Toilet Transfer) bariatric equipment;walker, front-wheeled  -WM       Row Name 05/03/24 1227          Gait/Stairs (Locomotion)    [place] Level (Gait) moderate assist (50% patient effort);2 person assist;verbal cues  -WM     Assistive Device (Gait) bariatric equipment;walker, front-wheeled  -     Distance in Feet (Gait) 2  -WM     Pattern (Gait) 4-point;step-to  -WM     Deviations/Abnormal Patterns (Gait) stride length decreased  -       Row Name 05/03/24 1227          Safety Issues, Functional Mobility    Impairments Affecting Function (Mobility) balance;endurance/activity tolerance;strength  -       Row Name 05/03/24 1227          Hip (Therapeutic Exercise)    Hip AAROM (Therapeutic Exercise) bilateral;aBduction;aDduction;supine;10 repetitions;5 repetitions;2 sets  -       Row Name 05/03/24 1227          Ankle (Therapeutic Exercise)    Ankle AROM (Therapeutic Exercise) bilateral;dorsiflexion;plantarflexion;supine;30 repititions  -       Row Name             Wound 03/27/24 1045 Right anterior Skin Tear    Wound - Properties Group Placement Date:  03/27/24  -CR Placement Time: 1045  -CR Side: Right  -CR Orientation: anterior  -CR Primary Wound Type: Skin tear  -CR    Retired Wound - Properties Group Placement Date: 03/27/24  -CR Placement Time: 1045  -CR Side: Right  -CR Orientation: anterior  -CR Primary Wound Type: Skin tear  -CR    Retired Wound - Properties Group Date first assessed: 03/27/24  -CR Time first assessed: 1045  -CR Side: Right  -CR Primary Wound Type: Skin tear  -CR      Row Name             Wound 03/28/24 0309 Other (See comments) medial coccyx MASD (Moisture associated skin damage)    Wound - Properties Group Placement Date: 03/28/24  -DS Placement Time: 0309  -DS Present on Original Admission: N  -DS Side: Other (See comments)  -DS, medial  Orientation: medial  -DS Location: coccyx  -DS Primary Wound Type: MASD  -DS    Retired Wound - Properties Group Placement Date: 03/28/24  -DS Placement Time: 0309  -DS Present on Original Admission: N  -DS Side: Other (See comments)  -DS, medial  Orientation: medial  -DS Location: coccyx  -DS Primary Wound Type: MASD  -DS    Retired Wound - Properties Group Date first assessed: 03/28/24  -DS Time first assessed: 0309  -DS Present on Original Admission: N  -DS Side: Other (See comments)  -DS, medial  Location: coccyx  -DS Primary Wound Type: MASD  -DS      Row Name             Wound 03/28/24 1225 Right medial ankle Traumatic    Wound - Properties Group Placement Date: 03/28/24  -FP Placement Time: 1225  -FP Side: Right  -FP Orientation: medial  -FP Location: ankle  -FP Primary Wound Type: Traumatic  -FP    Retired Wound - Properties Group Placement Date: 03/28/24  -FP Placement Time: 1225  -FP Side: Right  -FP Orientation: medial  -FP Location: ankle  -FP Primary Wound Type: Traumatic  -FP    Retired Wound - Properties Group Date first assessed: 03/28/24  -FP Time first assessed: 1225  -FP Side: Right  -FP Location: ankle  -FP Primary Wound Type: Traumatic  -FP      Row Name             Wound 04/03/24  1730 Right anterior hip MASD (Moisture associated skin damage)    Wound - Properties Group Placement Date: 04/03/24  -MA Placement Time: 1730  -MA Side: Right  -MA Orientation: anterior  -MA Location: hip  -MA Primary Wound Type: MASD  -MA    Retired Wound - Properties Group Placement Date: 04/03/24  -MA Placement Time: 1730  -MA Side: Right  -MA Orientation: anterior  -MA Location: hip  -MA Primary Wound Type: MASD  -MA    Retired Wound - Properties Group Date first assessed: 04/03/24  -MA Time first assessed: 1730  -MA Side: Right  -MA Location: hip  -MA Primary Wound Type: MASD  -MA      Row Name             Wound 05/02/24 1033 Right posterior plantar    Wound - Properties Group Placement Date: 05/02/24  -AA Placement Time: 1033  -AA Side: Right  -AA Orientation: posterior  -AA Location: plantar  -AA    Retired Wound - Properties Group Placement Date: 05/02/24  -AA Placement Time: 1033  -AA Side: Right  -AA Orientation: posterior  -AA Location: plantar  -AA    Retired Wound - Properties Group Date first assessed: 05/02/24  -AA Time first assessed: 1033  -AA Side: Right  -AA Location: plantar  -AA      Row Name 05/03/24 1227          Positioning and Restraints    Pre-Treatment Position in bed  -WM     Post Treatment Position chair  -WM     In Chair reclined;call light within reach;encouraged to call for assist  -WM       Row Name 05/03/24 1227          Progress Summary (PT)    Progress Toward Functional Goals (PT) progress toward functional goals is fair  -WM               User Key  (r) = Recorded By, (t) = Taken By, (c) = Cosigned By      Initials Name Provider Type    DS Linda Damico, RN Registered Nurse    Uyen Stewart, RN Registered Nurse    Bettye Anand, RN Registered Nurse    Nicole Elise, RN Registered Nurse    Adilson Carmona LPN Licensed Nurse    Carson Johnson PTA Physical Therapist Assistant                  Section G              Section GG                       Physical  Therapy Education       Title: PT OT SLP Therapies (Done)       Topic: Physical Therapy (Done)       Point: Mobility training (Done)       Learning Progress Summary             Patient Acceptance, E,D, DU by PG at 4/24/2024 1039    Acceptance, E,TB, VU by RM at 2/14/2024 0518    Acceptance, E,TB, VU by RM at 1/9/2024 0420    Acceptance, E, VU by CR at 12/30/2023 1750    Acceptance, E, VU by CR at 12/20/2023 1345    Acceptance, E, VU by CR at 12/19/2023 1004    Acceptance, E,TB, VU by RM at 11/30/2023 0420    Acceptance, E,TB, VU by MOE at 11/8/2023 1341                         Point: Precautions (Done)       Learning Progress Summary             Patient Acceptance, E,D, DU by PG at 4/24/2024 1039    Acceptance, E,TB, VU by RM at 2/14/2024 0518    Acceptance, E,TB, VU by RM at 1/9/2024 0420    Acceptance, E, VU by CR at 12/30/2023 1750    Acceptance, E,TB, VU by RM at 11/30/2023 0420    Acceptance, E,TB, VU by MOE at 11/8/2023 1341                                         User Key       Initials Effective Dates Name Provider Type Discipline     06/08/21 -  Bharath Berg, RN Registered Nurse Nurse    CR 06/13/22 -  Adilson Baker LPN Licensed Nurse Nurse    PG 06/16/21 -  Kodak Matos OT Occupational Therapist OT    MOE 06/03/21 -  Merlin Rao PT Physical Therapist PT                  PT Recommendation and Plan     Progress Summary (PT)  Progress Toward Functional Goals (PT): progress toward functional goals is fair  Daily Progress Summary (PT): Pt participated in BLE exercises in supine. Pt declined tfansfers due to having diarrhea.   Outcome Measures       Row Name 05/03/24 1245 05/02/24 1054          How much help from another person do you currently need...    Turning from your back to your side while in flat bed without using bedrails? 3  -WM 3  -WM     Moving from lying on back to sitting on the side of a flat bed without bedrails? 2  -WM 2  -WM     Moving to and from a bed to a chair (including a  wheelchair)? 2  -WM 2  -WM     Standing up from a chair using your arms (e.g., wheelchair, bedside chair)? 2  -WM 2  -WM     Climbing 3-5 steps with a railing? 1  -WM 1  -WM     To walk in hospital room? 2  -WM 2  -WM     AM-PAC 6 Clicks Score (PT) 12  -WM 12  -WM     Highest Level of Mobility Goal 4 --> Transfer to chair/commode  -WM 4 --> Transfer to chair/commode  -WM               User Key  (r) = Recorded By, (t) = Taken By, (c) = Cosigned By      Initials Name Provider Type     Carson Inman PTA Physical Therapist Assistant                     Time Calculation:    PT Charges       Row Name 05/03/24 1225             Time Calculation    PT Received On 05/03/24  -WM         Timed Charges    30563 - PT Therapeutic Exercise Minutes 5  -WM      22407 - Gait Training Minutes  8  -WM      86978 - PT Therapeutic Activity Minutes 25  -WM         SNF Physical Therapy Minutes    Skilled Minutes- PT 38 min  -WM         Total Minutes    Timed Charges Total Minutes 38  -WM       Total Minutes 38  -WM                User Key  (r) = Recorded By, (t) = Taken By, (c) = Cosigned By      Initials Name Provider Type     Carson Inman PTA Physical Therapist Assistant                  Therapy Charges for Today       Code Description Service Date Service Provider Modifiers Qty    86180509967 HC PT THER PROC EA 15 MIN 5/2/2024 Carson Inman PTA GP 1    82412517100 HC GAIT TRAINING EA 15 MIN 5/2/2024 Carson Inman PTA GP 1    12800511583 HC PT THERAPEUTIC ACT EA 15 MIN 5/2/2024 Carson Inman PTA GP 1            PT G-Codes  Outcome Measure Options: AM-PAC 6 Clicks Daily Activity (OT), Optimal Instrument  AM-PAC 6 Clicks Score (PT): 12  AM-PAC 6 Clicks Score (OT): 16    Carson Inman PTA  5/3/2024

## 2024-05-03 NOTE — THERAPY RE-EVALUATION
Acute Care - Physical Therapy Re-Evaluation  KEO Dominguez     Patient Name: Jose Shaikh  : 1960  MRN: [id number]  Today's Date: 5/3/2024      Visit Dx:     ICD-10-CM ICD-9-CM   1. Difficulty in walking  R26.2 719.7   2. Type 2 diabetes mellitus with other specified complication, unspecified whether long term insulin use  E11.69 250.80     Patient Active Problem List   Diagnosis    Diabetic ulcer of left heel associated with type 2 DM    Acute osteomyelitis of left calcaneus     Diabetic ulcer of left heel associated with type 2 DM    Diabetic ulcer of right midfoot associated with type 2 DM    Paroxysmal atrial fibrillation    Essential hypertension    Hyperlipidemia LDL goal <100    Cellulitis and abscess of foot    High alkaline phosphatase level    Osteomyelitis    Onychomycosis    Onychocryptosis    Foot pain, bilateral    Osteomyelitis of foot, right, acute    Cellulitis of right foot    Type 2 diabetes mellitus, with long-term current use of insulin    Class 3 severe obesity due to excess calories with serious comorbidity and body mass index (BMI) of 45.0 to 49.9 in adult    Anxiety disorder, unspecified    Claustrophobia    Dependence on wheelchair    Depression, unspecified    Long term (current) use of anticoagulants    Long term (current) use of oral hypoglycemic drugs    Wound of foot    Ulcer of right foot    Orthostatic hypotension    Other chronic osteomyelitis, right ankle and foot    Personal history of nicotine dependence    Thrombocytopenia, unspecified    Unspecified open wound, right foot, initial encounter    Diabetic foot infection    Subacute osteomyelitis of right foot    Right foot pain    Sepsis    Onychomycosis    Foot pain, left    Impaired mobility and ADLs    Absence of toe of right foot    Corns and callosity    Disability of walking    Fracture    Limb swelling    Polyneuropathy    Pressure ulcer, stage 1    Shortness of breath    Generalized weakness    Debility    Ulcerated,  foot, left, limited to breakdown of skin    Onychomycosis     Past Medical History:   Diagnosis Date    Absence of toe of right foot     Acute osteomyelitis of left calcaneus  08/18/2021    Anxiety and depression     Arthritis     Cancer     Chronic pain     STATES HIS PAIN IS 10/10 AAT    Claustrophobia     Corns and callus     Diabetic ulcer of left heel associated with type 2 DM 08/18/2021    Diabetic ulcer of left heel associated with type 2 DM 07/06/2021    Diabetic ulcer of right midfoot associated with type 2 DM 08/18/2021    Difficulty walking     Essential hypertension 08/31/2021    Hammertoe     Hyperlipidemia LDL goal <100 08/31/2021    Ingrown toenail     Obesity     Paroxysmal atrial fibrillation 08/31/2021    Polyneuropathy     Pressure ulcer, stage 1     Tinea unguium     Type 2 diabetes mellitus with polyneuropathy      Past Surgical History:   Procedure Laterality Date    CYST REMOVAL      center of back; benign    EYE SURGERY      INCISION AND DRAINAGE ABSCESS      back    INCISION AND DRAINAGE LEG Right 12/10/2021    Procedure: INCISION AND DRAINAGE LOWER EXTREMITY;  Surgeon: Ash Leyva DPM;  Location: [facility];  Service: Podiatry;  Laterality: Right;    OTHER SURGICAL HISTORY      Surgical clips left foot    TOE SURGERY Right     Removal of 5th toe    TRANS METATARSAL AMPUTATION Right 12/02/2021    Procedure: AMPUTATION TRANS METATARSAL;  Surgeon: Ash Leyva DPM;  Location: [facility];  Service: Podiatry;  Laterality: Right;    VASCULAR SURGERY      WRIST SURGERY Left     repair of injury     PT Assessment (Last 12 Hours)       PT Evaluation and Treatment       Row Name 05/03/24 1300 05/03/24 1227       Physical Therapy Time and Intention    Document Type re-evaluation  -MOE therapy note (daily note)  -WM    Mode of Treatment individual therapy;physical therapy  -MOE individual therapy;physical therapy  -WM    Patient Effort -- adequate  -WM    Symptoms Noted  During/After Treatment -- fatigue  -      Row Name 05/03/24 1300          Range of Motion (ROM)    Range of Motion ROM is WFL  -MOE       Row Name 05/03/24 1227          Bed Mobility    Supine-Sit [place] (Bed Mobility) minimum assist (75% patient effort);1 person assist;verbal cues  -WM     Bed Mobility, Safety Issues decreased use of legs for bridging/pushing  -     Assistive Device (Bed Mobility) overhead trapeze;bed rails  -       Row Name 05/03/24 1227          Sit-Stand Transfer    Sit-Stand [place] (Transfers) moderate assist (50% patient effort);2 person assist;verbal cues  -     Assistive Device (Sit-Stand Transfers) bariatric;walker, front-wheeled  -WM       Row Name 05/03/24 1227          Stand-Sit Transfer    Stand-Sit [place] (Transfers) moderate assist (50% patient effort);2 person assist;verbal cues  -WM       Row Name 05/03/24 1227          Toilet Transfer    [place] Level (Toilet Transfer) moderate assist (50% patient effort);2 person assist;verbal cues  -     Assistive Device (Toilet Transfer) bariatric equipment;walker, front-wheeled  -WM       Row Name 05/03/24 1227          Gait/Stairs (Locomotion)    [place] Level (Gait) moderate assist (50% patient effort);2 person assist;verbal cues  -     Assistive Device (Gait) bariatric equipment;walker, front-wheeled  -     Distance in Feet (Gait) 2  -WM     Pattern (Gait) 4-point;step-to  -WM     Deviations/Abnormal Patterns (Gait) stride length decreased  -       Row Name 05/03/24 1227          Safety Issues, Functional Mobility    Impairments Affecting Function (Mobility) balance;endurance/activity tolerance;strength  -       Row Name 05/03/24 1227          Hip (Therapeutic Exercise)    Hip AAROM (Therapeutic Exercise) bilateral;aBduction;aDduction;supine;10 repetitions;5 repetitions;2 sets  -       Row Name 05/03/24 1227          Ankle (Therapeutic Exercise)    Ankle AROM (Therapeutic Exercise)  bilateral;dorsiflexion;plantarflexion;supine;30 repititions  -WM       Row Name             Wound 03/27/24 1045 Right anterior Skin Tear    Wound - Properties Group Placement Date: 03/27/24  -CR Placement Time: 1045  -CR Side: Right  -CR Orientation: anterior  -CR Primary Wound Type: Skin tear  -CR    Retired Wound - Properties Group Placement Date: 03/27/24  -CR Placement Time: 1045  -CR Side: Right  -CR Orientation: anterior  -CR Primary Wound Type: Skin tear  -CR    Retired Wound - Properties Group Date first assessed: 03/27/24  -CR Time first assessed: 1045  -CR Side: Right  -CR Primary Wound Type: Skin tear  -CR      Row Name             Wound 03/28/24 0309 Other (See comments) medial coccyx MASD (Moisture associated skin damage)    Wound - Properties Group Placement Date: 03/28/24  -DS Placement Time: 0309  -DS Present on Original Admission: N  -DS Side: Other (See comments)  -DS, medial  Orientation: medial  -DS Location: coccyx  -DS Primary Wound Type: MASD  -DS    Retired Wound - Properties Group Placement Date: 03/28/24  -DS Placement Time: 0309  -DS Present on Original Admission: N  -DS Side: Other (See comments)  -DS, medial  Orientation: medial  -DS Location: coccyx  -DS Primary Wound Type: MASD  -DS    Retired Wound - Properties Group Date first assessed: 03/28/24  -DS Time first assessed: 0309  -DS Present on Original Admission: N  -DS Side: Other (See comments)  -DS, medial  Location: coccyx  -DS Primary Wound Type: MASD  -DS      Row Name             Wound 03/28/24 1225 Right medial ankle Traumatic    Wound - Properties Group Placement Date: 03/28/24  -FP Placement Time: 1225  -FP Side: Right  -FP Orientation: medial  -FP Location: ankle  -FP Primary Wound Type: Traumatic  -FP    Retired Wound - Properties Group Placement Date: 03/28/24  -FP Placement Time: 1225  -FP Side: Right  -FP Orientation: medial  -FP Location: ankle  -FP Primary Wound Type: Traumatic  -FP    Retired Wound - Properties Group  Date first assessed: 03/28/24  -FP Time first assessed: 1225  -FP Side: Right  -FP Location: ankle  -FP Primary Wound Type: Traumatic  -FP      Row Name             Wound 04/03/24 1730 Right anterior hip MASD (Moisture associated skin damage)    Wound - Properties Group Placement Date: 04/03/24  -NJ Placement Time: 1730  -NJ Side: Right  -NJ Orientation: anterior  -NJ Location: hip  -NJ Primary Wound Type: MASD  -NJ    Retired Wound - Properties Group Placement Date: 04/03/24  -NJ Placement Time: 1730  -NJ Side: Right  -NJ Orientation: anterior  -NJ Location: hip  -NJ Primary Wound Type: MASD  -NJ    Retired Wound - Properties Group Date first assessed: 04/03/24  -NJ Time first assessed: 1730  -NJ Side: Right  -NJ Location: hip  -NJ Primary Wound Type: MASD  -NJ      Row Name             Wound 05/02/24 1033 Right posterior plantar    Wound - Properties Group Placement Date: 05/02/24  -AA Placement Time: 1033  -AA Side: Right  -AA Orientation: posterior  -AA Location: plantar  -AA    Retired Wound - Properties Group Placement Date: 05/02/24  -AA Placement Time: 1033  -AA Side: Right  -AA Orientation: posterior  -AA Location: plantar  -AA    Retired Wound - Properties Group Date first assessed: 05/02/24  -AA Time first assessed: 1033  -AA Side: Right  -AA Location: plantar  -AA      Row Name 05/03/24 1227          Positioning and Restraints    Pre-Treatment Position in bed  -WM     Post Treatment Position chair  -WM     In Chair reclined;call light within reach;encouraged to call for assist  -       Row Name 05/03/24 1300 05/03/24 1227       Progress Summary (PT)    Progress Toward Functional Goals (PT) progress toward functional goals is fair  -MOE progress toward functional goals is fair  -WM    Barriers to Overall Progress (PT) Pt has made progress over these last 10 days.  He is able to ambulate short distances with assistance of 2 and is performing BSC and chair transfers.  Will update plan accordingly and  continue current plan for 10 more days.  -MOE --      Row Name 05/03/24 1300          Bed Mobility Goal 1 (PT)    Activity/Assistive Device (Bed Mobility Goal 1, PT) bed mobility activities, all  -MOE     [place] Level/Cues Needed (Bed Mobility Goal 1, PT) independent  -MOE     Time Frame (Bed Mobility Goal 1, PT) 30 days;long term goal (LTG)  -MOE     Progress/Outcomes (Bed Mobility Goal 1, PT) good progress toward goal;continuing progress toward goal  -MOE       Row Name 05/03/24 1300          Transfer Goal 1 (PT)    Activity/Assistive Device (Transfer Goal 1, PT) transfers, all  -MOE     [place] Level/Cues Needed (Transfer Goal 1, PT) contact guard required  -MOE     Time Frame (Transfer Goal 1, PT) long term goal (LTG);10 days  -MOE     Progress/Outcome (Transfer Goal 1, PT) continuing progress toward goal  -MOE       Row Name 05/03/24 1300          Gait Training Goal 1 (PT)    Activity/Assistive Device (Gait Training Goal 1, PT) gait (walking locomotion);assistive device use;walker, rolling  -MOE     [place] Level (Gait Training Goal 1, PT) contact guard required  -MOE     Distance (Gait Training Goal 1, PT) 10  -MOE     Time Frame (Gait Training Goal 1, PT) long term goal (LTG);10 days  -MOE     Progress/Outcome (Gait Training Goal 1, PT) continuing progress toward goal  -MOE               User Key  (r) = Recorded By, (t) = Taken By, (c) = Cosigned By      Initials Name Provider Type    DS Linda Damico RN Registered Nurse    Uyen Stewart RN Registered Nurse    Bettye Anand RN Registered Nurse    Nicole Elise, RN Registered Nurse    Adilson Carmona LPN Licensed Nurse    Carson Johnson PTA Physical Therapist Assistant    Merlin De La O, PT Physical Therapist                    Physical Therapy Education       Title: PT OT SLP Therapies (Done)       Topic: Physical Therapy (Done)       Point: Mobility training (Done)       Learning Progress Summary             Patient  Acceptance, E,D, DU by PG at 4/24/2024 1039    Acceptance, E,TB, VU by RM at 2/14/2024 0518    Acceptance, E,TB, VU by RM at 1/9/2024 0420    Acceptance, E, VU by CR at 12/30/2023 1750    Acceptance, E, VU by CR at 12/20/2023 1345    Acceptance, E, VU by CR at 12/19/2023 1004    Acceptance, E,TB, VU by RM at 11/30/2023 0420    Acceptance, E,TB, VU by MOE at 11/8/2023 1341                         Point: Precautions (Done)       Learning Progress Summary             Patient Acceptance, E,D, DU by PG at 4/24/2024 1039    Acceptance, E,TB, VU by RM at 2/14/2024 0518    Acceptance, E,TB, VU by RM at 1/9/2024 0420    Acceptance, E, VU by CR at 12/30/2023 1750    Acceptance, E,TB, VU by RM at 11/30/2023 0420    Acceptance, E,TB, VU by MOE at 11/8/2023 1341                                         User Key       Initials Effective Dates Name Provider Type Discipline     06/08/21 -  Bharath Berg, RN Registered Nurse Nurse    CR 06/13/22 -  Adilson Baker LPN Licensed Nurse Nurse    PG 06/16/21 -  Kodak Matos OT Occupational Therapist OT    MOE 06/03/21 -  Merlin Rao, PT Physical Therapist PT                  PT Recommendation and Plan  Anticipated Discharge Disposition (PT): extended care facility  Planned Therapy Interventions (PT): balance training, bed mobility training, gait training, home exercise program, stair training, strengthening, transfer training  Therapy Frequency (PT): 6 times/wk  Progress Summary (PT)  Progress Toward Functional Goals (PT): progress toward functional goals is fair  Daily Progress Summary (PT): Pt was better able to tolerate ambulation today.  For the first time he was able to advance his RLE without manual assistance.  Will continure current plan  Barriers to Overall Progress (PT): Pt has made progress over these last 10 days.  He is able to ambulate short distances with assistance of 2 and is performing BSC and chair transfers.  Will update plan accordingly and continue current plan  for 10 more days.  Plan of Care Reviewed With: patient  Progress: improving  Outcome Evaluation: Pt was able to ambulate today with heavy encouragment.  He is being limited by his fear of falling but with prompting pt can ambulate a few feet at a time.  Will attempt ambulating with chair follow tomorrow.   Outcome Measures       Row Name 05/03/24 1300 05/03/24 1245 05/02/24 1054       How much help from another person do you currently need...    Turning from your back to your side while in flat bed without using bedrails? 3  -MOE 3  -WM 3  -WM    Moving from lying on back to sitting on the side of a flat bed without bedrails? 3  -MOE 2  -WM 2  -WM    Moving to and from a bed to a chair (including a wheelchair)? 2  -MOE 2  -WM 2  -WM    Standing up from a chair using your arms (e.g., wheelchair, bedside chair)? 2  -MOE 2  -WM 2  -WM    Climbing 3-5 steps with a railing? 1  -MOE 1  -WM 1  -WM    To walk in hospital room? 2  -MOE 2  -WM 2  -WM    AM-PAC 6 Clicks Score (PT) 13  -MOE 12  -WM 12  -WM    Highest Level of Mobility Goal 4 --> Transfer to chair/commode  -MOE 4 --> Transfer to chair/commode  -WM 4 --> Transfer to chair/commode  -WM       Functional Assessment    Outcome Measure Options AM-PAC 6 Clicks Basic Mobility (PT)  -MOE -- --              User Key  (r) = Recorded By, (t) = Taken By, (c) = Cosigned By      Initials Name Provider Type    Carson Johnson, CUR[place]IS Physical Therapist Assistant    Merlin De La O, PT Physical Therapist                     Time Calculation:    PT Charges       Row Name 05/03/24 1325 05/03/24 1225          Time Calculation    PT Received On 05/03/24  -MOE 05/03/24  -        Timed Charges    83563 - PT Therapeutic Exercise Minutes -- 5  -WM     91267 - Gait Training Minutes  -- 8  -WM     83501 - PT Therapeutic Activity Minutes -- 25  -WM        Untimed Charges    PT Eval/Re-eval Minutes 20  -MOE --        SNF Physical Therapy Minutes    Skilled Minutes- PT -- 38 min  -WM        Total  Minutes    Timed Charges Total Minutes -- 38  -WM     Untimed Charges Total Minutes 20  -MOE --      Total Minutes 20  -MOE 38  -WM               User Key  (r) = Recorded By, (t) = Taken By, (c) = Cosigned By      Initials Name Provider Type    Carson Johnson, PTA Physical Therapist Assistant    Merlin De La O, PT Physical Therapist                      PT G-Codes  Outcome Measure Options: AM-PAC 6 Clicks Basic Mobility (PT)  AM-PAC 6 Clicks Score (PT): 13  AM-PAC 6 Clicks Score (OT): 16    Merlin Rao, MERLIN  5/3/2024

## 2024-05-03 NOTE — PLAN OF CARE
Goal Outcome Evaluation:  Plan of Care Reviewed With: patient        Progress: improving     Patient is alert and oriented x4. Patient is able to make needs known to staff. C/o hip and should pain, medication given. Patient sat up in bedside chair for 20 minutes with assistance of 3. Patient transfers to [facility] [place] with 3 assist. Patient requested that gauze to right leg be removed. Will continue with current plan of care.

## 2024-05-04 LAB
GLUCOSE BLDC GLUCOMTR-MCNC: 111 MG/DL (ref 70–99)
GLUCOSE BLDC GLUCOMTR-MCNC: 123 MG/DL (ref 70–99)
GLUCOSE BLDC GLUCOMTR-MCNC: 137 MG/DL (ref 70–99)
GLUCOSE BLDC GLUCOMTR-MCNC: 141 MG/DL (ref 70–99)

## 2024-05-04 PROCEDURE — 82948 REAGENT STRIP/BLOOD GLUCOSE: CPT

## 2024-05-04 PROCEDURE — 63710000001 INSULIN DETEMIR PER 5 UNITS: Performed by: PHYSICIAN ASSISTANT

## 2024-05-04 PROCEDURE — 82948 REAGENT STRIP/BLOOD GLUCOSE: CPT | Performed by: INTERNAL MEDICINE

## 2024-05-04 PROCEDURE — 97110 THERAPEUTIC EXERCISES: CPT

## 2024-05-04 RX ADMIN — EMPAGLIFLOZIN 10 MG: 10 TABLET, FILM COATED ORAL at 08:24

## 2024-05-04 RX ADMIN — SERTRALINE HYDROCHLORIDE 50 MG: 50 TABLET ORAL at 20:39

## 2024-05-04 RX ADMIN — ACETAMINOPHEN 650 MG: 325 TABLET ORAL at 00:41

## 2024-05-04 RX ADMIN — INSULIN DETEMIR 40 UNITS: 100 INJECTION, SOLUTION SUBCUTANEOUS at 20:39

## 2024-05-04 RX ADMIN — PREGABALIN 100 MG: 100 CAPSULE ORAL at 16:21

## 2024-05-04 RX ADMIN — OXYCODONE HYDROCHLORIDE AND ACETAMINOPHEN 1 TABLET: 10; 325 TABLET ORAL at 21:32

## 2024-05-04 RX ADMIN — OXYCODONE HYDROCHLORIDE AND ACETAMINOPHEN 1 TABLET: 10; 325 TABLET ORAL at 05:25

## 2024-05-04 RX ADMIN — DICLOFENAC SODIUM 4 G: 10 GEL TOPICAL at 20:40

## 2024-05-04 RX ADMIN — BACLOFEN 10 MG: 10 TABLET ORAL at 21:32

## 2024-05-04 RX ADMIN — APIXABAN 5 MG: 5 TABLET, FILM COATED ORAL at 08:24

## 2024-05-04 RX ADMIN — PREGABALIN 100 MG: 100 CAPSULE ORAL at 20:39

## 2024-05-04 RX ADMIN — DICLOFENAC SODIUM 4 G: 10 GEL TOPICAL at 08:24

## 2024-05-04 RX ADMIN — BACLOFEN 10 MG: 10 TABLET ORAL at 05:25

## 2024-05-04 RX ADMIN — Medication 500 MG: at 20:39

## 2024-05-04 RX ADMIN — Medication 10 MG: at 20:39

## 2024-05-04 RX ADMIN — DICLOFENAC SODIUM 4 G: 10 GEL TOPICAL at 18:08

## 2024-05-04 RX ADMIN — FUROSEMIDE 40 MG: 40 TABLET ORAL at 08:24

## 2024-05-04 RX ADMIN — INSULIN DETEMIR 40 UNITS: 100 INJECTION, SOLUTION SUBCUTANEOUS at 08:23

## 2024-05-04 RX ADMIN — FERROUS SULFATE TAB 325 MG (65 MG ELEMENTAL FE) 325 MG: 325 (65 FE) TAB at 08:24

## 2024-05-04 RX ADMIN — ATORVASTATIN CALCIUM 10 MG: 10 TABLET, FILM COATED ORAL at 20:39

## 2024-05-04 RX ADMIN — CYANOCOBALAMIN TAB 500 MCG 1000 MCG: 500 TAB at 08:23

## 2024-05-04 RX ADMIN — OXYCODONE HYDROCHLORIDE AND ACETAMINOPHEN 1 TABLET: 10; 325 TABLET ORAL at 13:28

## 2024-05-04 RX ADMIN — APIXABAN 5 MG: 5 TABLET, FILM COATED ORAL at 20:39

## 2024-05-04 RX ADMIN — LISINOPRIL 2.5 MG: 2.5 TABLET ORAL at 08:24

## 2024-05-04 RX ADMIN — DICLOFENAC SODIUM 4 G: 10 GEL TOPICAL at 12:18

## 2024-05-04 RX ADMIN — PREGABALIN 100 MG: 100 CAPSULE ORAL at 08:24

## 2024-05-04 RX ADMIN — BACLOFEN 10 MG: 10 TABLET ORAL at 13:28

## 2024-05-04 RX ADMIN — Medication 500 MG: at 08:24

## 2024-05-04 NOTE — PLAN OF CARE
Goal Outcome Evaluation:  Plan of Care Reviewed With: patient        Progress: improving  Outcome Evaluation: Patient alert and oriented x4. Able to make needs known to staff. Used bedpan for bowel movements this shift. Still using urinal independently. Blood sugar at bedtime was 182. Short and long acting insulins given per MAR. Snack given to patient of ice cream, per request. PRN Baclofen, Percocet, and Tylenol given x1 for hip pain this shift and patient was resting with eyes closed on pain reevaluation of all medications. Call light and personal items within reach. Continue with current plan of care.

## 2024-05-04 NOTE — THERAPY TREATMENT NOTE
SNF - Physical Therapy Progress Note  KEO Dominguez     Patient Name: Jose Shaikh  : 1960  MRN: [id number]  Today's Date: 2024      Visit Dx:    ICD-10-CM ICD-9-CM   1. Difficulty in walking  R26.2 719.7   2. Type 2 diabetes mellitus with other specified complication, unspecified whether long term insulin use  E11.69 250.80     Patient Active Problem List   Diagnosis    Diabetic ulcer of left heel associated with type 2 DM    Acute osteomyelitis of left calcaneus     Diabetic ulcer of left heel associated with type 2 DM    Diabetic ulcer of right midfoot associated with type 2 DM    Paroxysmal atrial fibrillation    Essential hypertension    Hyperlipidemia LDL goal <100    Cellulitis and abscess of foot    High alkaline phosphatase level    Osteomyelitis    Onychomycosis    Onychocryptosis    Foot pain, bilateral    Osteomyelitis of foot, right, acute    Cellulitis of right foot    Type 2 diabetes mellitus, with long-term current use of insulin    Class 3 severe obesity due to excess calories with serious comorbidity and body mass index (BMI) of 45.0 to 49.9 in adult    Anxiety disorder, unspecified    Claustrophobia    Dependence on wheelchair    Depression, unspecified    Long term (current) use of anticoagulants    Long term (current) use of oral hypoglycemic drugs    Wound of foot    Ulcer of right foot    Orthostatic hypotension    Other chronic osteomyelitis, right ankle and foot    Personal history of nicotine dependence    Thrombocytopenia, unspecified    Unspecified open wound, right foot, initial encounter    Diabetic foot infection    Subacute osteomyelitis of right foot    Right foot pain    Sepsis    Onychomycosis    Foot pain, left    Impaired mobility and ADLs    Absence of toe of right foot    Corns and callosity    Disability of walking    Fracture    Limb swelling    Polyneuropathy    Pressure ulcer, stage 1    Shortness of breath    Generalized weakness    Debility    Ulcerated, foot,  left, limited to breakdown of skin    Onychomycosis     Past Medical History:   Diagnosis Date    Absence of toe of right foot     Acute osteomyelitis of left calcaneus  08/18/2021    Anxiety and depression     Arthritis     Cancer     Chronic pain     STATES HIS PAIN IS 10/10 AAT    Claustrophobia     Corns and callus     Diabetic ulcer of left heel associated with type 2 DM 08/18/2021    Diabetic ulcer of left heel associated with type 2 DM 07/06/2021    Diabetic ulcer of right midfoot associated with type 2 DM 08/18/2021    Difficulty walking     Essential hypertension 08/31/2021    Hammertoe     Hyperlipidemia LDL goal <100 08/31/2021    Ingrown toenail     Obesity     Paroxysmal atrial fibrillation 08/31/2021    Polyneuropathy     Pressure ulcer, stage 1     Tinea unguium     Type 2 diabetes mellitus with polyneuropathy      Past Surgical History:   Procedure Laterality Date    CYST REMOVAL      center of back; benign    EYE SURGERY      INCISION AND DRAINAGE ABSCESS      back    INCISION AND DRAINAGE LEG Right 12/10/2021    Procedure: INCISION AND DRAINAGE LOWER EXTREMITY;  Surgeon: Ash Leyva DPM;  Location: [facility] (formerly [facility]3];  Service: Podiatry;  Laterality: Right;    OTHER SURGICAL HISTORY      Surgical clips left foot    TOE SURGERY Right     Removal of 5th toe    TRANS METATARSAL AMPUTATION Right 12/02/2021    Procedure: AMPUTATION TRANS METATARSAL;  Surgeon: Ash Leyva DPM;  Location: [facility] (formerly [facility])[3];  Service: Podiatry;  Laterality: Right;    VASCULAR SURGERY      WRIST SURGERY Left     repair of injury       PT Assessment (Last 12 Hours)       PT Evaluation and Treatment       Row Name 05/04/24 1300          Physical Therapy Time and Intention    Subjective Information complains of;pain  -CS     Document Type therapy note (daily note)  -CS     Mode of Treatment individual therapy;physical therapy  -CS     Patient Effort good  -CS     Symptoms Noted During/After Treatment fatigue   -CS       Row Name 05/04/24 1300          Pain    Pretreatment Pain Rating 7/10  -CS     Posttreatment Pain Rating 7/10  -CS     Pain Location - Side/Orientation Left  -CS     Pain Location generalized  -CS     Pain Location - hip;knee  -CS       Row Name 05/04/24 1300          Hip (Therapeutic Exercise)    Hip AROM (Therapeutic Exercise) bilateral;external rotation;internal rotation;supine;30 repititions  -CS     Hip AAROM (Therapeutic Exercise) bilateral;aBduction;aDduction;10 repetitions;3 sets;supine  heel slides  -CS     Hip Isometrics (Therapeutic Exercise) bilateral;gluteal sets;supine;10 repetitions;3 sets  -CS       Row Name 05/04/24 1300          Knee (Therapeutic Exercise)    Knee AROM (Therapeutic Exercise) bilateral;SAQ (short arc quad);supine;30 repititions  -CS     Knee AAROM (Therapeutic Exercise) bilateral;supine;10 repetitions;3 sets  SLR's  -CS     Knee Isometrics (Therapeutic Exercise) bilateral;quad sets;supine;10 repetitions;3 sets  -CS       Row Name 05/04/24 1300          Ankle (Therapeutic Exercise)    Ankle AROM (Therapeutic Exercise) bilateral;dorsiflexion;plantarflexion;supine;30 repititions  -CS       Row Name             Wound 03/27/24 1045 Right anterior Skin Tear    Wound - Properties Group Placement Date: 03/27/24  -CR Placement Time: 1045  -CR Side: Right  -CR Orientation: anterior  -CR Primary Wound Type: Skin tear  -CR    Retired Wound - Properties Group Placement Date: 03/27/24  -CR Placement Time: 1045  -CR Side: Right  -CR Orientation: anterior  -CR Primary Wound Type: Skin tear  -CR    Retired Wound - Properties Group Date first assessed: 03/27/24  -CR Time first assessed: 1045  -CR Side: Right  -CR Primary Wound Type: Skin tear  -CR      Row Name             Wound 03/28/24 0309 Other (See comments) medial coccyx MASD (Moisture associated skin damage)    Wound - Properties Group Placement Date: 03/28/24  -DS Placement Time: 0309  -DS Present on Original Admission: N  -DS Side:  Other (See comments)  -DS, medial  Orientation: medial  -DS Location: coccyx  -DS Primary Wound Type: MASD  -DS    Retired Wound - Properties Group Placement Date: 03/28/24  -DS Placement Time: 0309  -DS Present on Original Admission: N  -DS Side: Other (See comments)  -DS, medial  Orientation: medial  -DS Location: coccyx  -DS Primary Wound Type: MASD  -DS    Retired Wound - Properties Group Date first assessed: 03/28/24  -DS Time first assessed: 0309  -DS Present on Original Admission: N  -DS Side: Other (See comments)  -DS, medial  Location: coccyx  -DS Primary Wound Type: MASD  -DS      Row Name             Wound 03/28/24 1225 Right medial ankle Traumatic    Wound - Properties Group Placement Date: 03/28/24  -FP Placement Time: 1225  -FP Side: Right  -FP Orientation: medial  -FP Location: ankle  -FP Primary Wound Type: Traumatic  -FP    Retired Wound - Properties Group Placement Date: 03/28/24  -FP Placement Time: 1225  -FP Side: Right  -FP Orientation: medial  -FP Location: ankle  -FP Primary Wound Type: Traumatic  -FP    Retired Wound - Properties Group Date first assessed: 03/28/24  -FP Time first assessed: 1225  -FP Side: Right  -FP Location: ankle  -FP Primary Wound Type: Traumatic  -FP      Row Name             Wound 04/03/24 1730 Right anterior hip MASD (Moisture associated skin damage)    Wound - Properties Group Placement Date: 04/03/24  -PA Placement Time: 1730  -PA Side: Right  -PA Orientation: anterior  -PA Location: hip  -PA Primary Wound Type: MASD  -PA    Retired Wound - Properties Group Placement Date: 04/03/24  -PA Placement Time: 1730  -PA Side: Right  -PA Orientation: anterior  -PA Location: hip  -PA Primary Wound Type: MASD  -PA    Retired Wound - Properties Group Date first assessed: 04/03/24  -PA Time first assessed: 1730  -PA Side: Right  -PA Location: hip  -PA Primary Wound Type: MASD  -PA      Row Name             Wound 05/02/24 1033 Left posterior plantar    Wound - Properties Group  Placement Date: 05/02/24  -KR Placement Time: 1033 -KR Side: Left  -KR Orientation: posterior  -KR Location: plantar  -KR    Retired Wound - Properties Group Placement Date: 05/02/24  -KR Placement Time: 1033  -KR Side: Left  -KR Orientation: posterior  -KR Location: plantar  -KR    Retired Wound - Properties Group Date first assessed: 05/02/24  -KR Time first assessed: 1033  -KR Side: Left  -KR Location: plantar  -KR      Row Name 05/04/24 1300          Positioning and Restraints    Pre-Treatment Position in bed  -CS     Post Treatment Position bed  -CS     In Bed fowlers;call light within reach;encouraged to call for assist  no alarms active upon entering room  -CS       Row Name 05/04/24 1300          Progress Summary (PT)    Progress Toward Functional Goals (PT) progress toward functional goals is fair  -CS               User Key  (r) = Recorded By, (t) = Taken By, (c) = Cosigned By      Initials Name Provider Type    DS Linda Damico, RN Registered Nurse    Bettye Anand, RN Registered Nurse    Nicole Elsie, RN Registered Nurse    Adilson Carmona LPN Licensed Nurse    Ronald Huerta PTA Physical Therapist Assistant    Almaz Morales RN Registered Nurse                  Section G              Section GG                       Physical Therapy Education       Title: PT OT SLP Therapies (Done)       Topic: Physical Therapy (Done)       Point: Mobility training (Done)       Learning Progress Summary             Patient Acceptance, E,D, DU by PG at 4/24/2024 1039    Acceptance, E,TB, VU by RM at 2/14/2024 0518    Acceptance, E,TB, VU by RM at 1/9/2024 0420    Acceptance, E, VU by CR at 12/30/2023 1750    Acceptance, E, VU by CR at 12/20/2023 1345    Acceptance, E, VU by CR at 12/19/2023 1004    Acceptance, E,TB, VU by RM at 11/30/2023 0420    Acceptance, E,TB, VU by MOE at 11/8/2023 1341                         Point: Precautions (Done)       Learning Progress Summary             Patient  Acceptance, E,D, DU by PG at 4/24/2024 1039    Acceptance, E,TB, VU by RM at 2/14/2024 0518    Acceptance, E,TB, VU by RM at 1/9/2024 0420    Acceptance, E, VU by CR at 12/30/2023 1750    Acceptance, E,TB, VU by RM at 11/30/2023 0420    Acceptance, E,TB, VU by MOE at 11/8/2023 1341                                         User Key       Initials Effective Dates Name Provider Type Discipline     06/08/21 -  Bharath Berg, RN Registered Nurse Nurse    CR 06/13/22 -  Adilson Baker LPN Licensed Nurse Nurse    PG 06/16/21 -  Kodak Matos OT Occupational Therapist OT    MOE 06/03/21 -  Merlin Rao PT Physical Therapist PT                  PT Recommendation and Plan     Progress Summary (PT)  Progress Toward Functional Goals (PT): progress toward functional goals is fair  Daily Progress Summary (PT): Decreased assistance for ther-ex's today in comparison to treatment I performed with pt. last weekend.  Pt. still requires assistance but overall, assistance level decreased with range of motion in bilateral lower extremeties.   Outcome Measures       Row Name 05/04/24 1300 05/03/24 1300 05/03/24 1245       How much help from another person do you currently need...    Turning from your back to your side while in flat bed without using bedrails? 3  -CS 3  -MOE 3  -WM    Moving from lying on back to sitting on the side of a flat bed without bedrails? 3  -CS 3  -MOE 2  -WM    Moving to and from a bed to a chair (including a wheelchair)? 2  -CS 2  -MOE 2  -WM    Standing up from a chair using your arms (e.g., wheelchair, bedside chair)? 2  -CS 2  -MOE 2  -WM    Climbing 3-5 steps with a railing? 1  -CS 1  -MOE 1  -WM    To walk in hospital room? 2  -CS 2  -MOE 2  -WM    AM-PAC 6 Clicks Score (PT) 13  -CS 13  -MOE 12  -WM    Highest Level of Mobility Goal 4 --> Transfer to chair/commode  -CS 4 --> Transfer to chair/commode  -MOE 4 --> Transfer to chair/commode  -WM       Functional Assessment    Outcome Measure Options AM-PAC 6  Clicks Basic Mobility (PT)  -CS AM-PAC 6 Clicks Basic Mobility (PT)  -MOE --      Row Name 05/02/24 1054             How much help from another person do you currently need...    Turning from your back to your side while in flat bed without using bedrails? 3  -WM      Moving from lying on back to sitting on the side of a flat bed without bedrails? 2  -WM      Moving to and from a bed to a chair (including a wheelchair)? 2  -WM      Standing up from a chair using your arms (e.g., wheelchair, bedside chair)? 2  -WM      Climbing 3-5 steps with a railing? 1  -WM      To walk in hospital room? 2  -WM      AM-PAC 6 Clicks Score (PT) 12  -WM      Highest Level of Mobility Goal 4 --> Transfer to chair/commode  -WM                User Key  (r) = Recorded By, (t) = Taken By, (c) = Cosigned By      Initials Name Provider Type    WM Carson Inman PTA Physical Therapist Assistant    Merlin De La O, PT Physical Therapist    CS Ronald Fabian PTA Physical Therapist Assistant                     Time Calculation:    PT Charges       Row Name 05/04/24 1347             Time Calculation    Start Time 1053  -CS      PT Received On 05/04/24  -CS         Timed Charges    95464 - PT Therapeutic Exercise Minutes 27  -CS         SNF Physical Therapy Minutes    Skilled Minutes- PT 27 min  -CS         Total Minutes    Timed Charges Total Minutes 27  -CS       Total Minutes 27  -CS                User Key  (r) = Recorded By, (t) = Taken By, (c) = Cosigned By      Initials Name Provider Type    CS Ronald Fabian PTA Physical Therapist Assistant                  Therapy Charges for Today       Code Description Service Date Service Provider Modifiers Qty    94702048106 HC PT THER PROC EA 15 MIN 5/4/2024 Ronald Fabian PTA GP 2            PT G-Codes  Outcome Measure Options: AM-PAC 6 Clicks Basic Mobility (PT)  AM-PAC 6 Clicks Score (PT): 13  AM-PAC 6 Clicks Score (OT): 16    Ronald Fabian PTA  5/4/2024

## 2024-05-04 NOTE — PLAN OF CARE
Goal Outcome Evaluation:  Plan of Care Reviewed With: patient        Progress: improving     Patient alert and oriented x4.patient is able to  make needs known to staff. Used bedpan for bowel movements this shift.  PRN Baclofen and Percocet, given x1 for hip pain this shift and patient was resting with eyes closed during the rest of the shift. Call light and personal items within reach. Continue with current plan of care.

## 2024-05-05 LAB
GLUCOSE BLDC GLUCOMTR-MCNC: 113 MG/DL (ref 70–99)
GLUCOSE BLDC GLUCOMTR-MCNC: 140 MG/DL (ref 70–99)
GLUCOSE BLDC GLUCOMTR-MCNC: 146 MG/DL (ref 70–99)
GLUCOSE BLDC GLUCOMTR-MCNC: 155 MG/DL (ref 70–99)

## 2024-05-05 PROCEDURE — 63710000001 INSULIN DETEMIR PER 5 UNITS: Performed by: PHYSICIAN ASSISTANT

## 2024-05-05 PROCEDURE — 82948 REAGENT STRIP/BLOOD GLUCOSE: CPT | Performed by: INTERNAL MEDICINE

## 2024-05-05 PROCEDURE — 82948 REAGENT STRIP/BLOOD GLUCOSE: CPT

## 2024-05-05 PROCEDURE — 63710000001 INSULIN LISPRO (HUMAN) PER 5 UNITS: Performed by: INTERNAL MEDICINE

## 2024-05-05 PROCEDURE — 99308 SBSQ NF CARE LOW MDM 20: CPT | Performed by: PHYSICIAN ASSISTANT

## 2024-05-05 RX ADMIN — Medication 500 MG: at 20:44

## 2024-05-05 RX ADMIN — APIXABAN 5 MG: 5 TABLET, FILM COATED ORAL at 20:44

## 2024-05-05 RX ADMIN — PREGABALIN 100 MG: 100 CAPSULE ORAL at 16:13

## 2024-05-05 RX ADMIN — APIXABAN 5 MG: 5 TABLET, FILM COATED ORAL at 08:18

## 2024-05-05 RX ADMIN — DICLOFENAC SODIUM 4 G: 10 GEL TOPICAL at 20:46

## 2024-05-05 RX ADMIN — BACLOFEN 10 MG: 10 TABLET ORAL at 13:41

## 2024-05-05 RX ADMIN — ACETAMINOPHEN 650 MG: 325 TABLET ORAL at 22:31

## 2024-05-05 RX ADMIN — SERTRALINE HYDROCHLORIDE 50 MG: 50 TABLET ORAL at 20:45

## 2024-05-05 RX ADMIN — FUROSEMIDE 40 MG: 40 TABLET ORAL at 08:18

## 2024-05-05 RX ADMIN — INSULIN LISPRO 4 UNITS: 100 INJECTION, SOLUTION INTRAVENOUS; SUBCUTANEOUS at 11:37

## 2024-05-05 RX ADMIN — BACLOFEN 10 MG: 10 TABLET ORAL at 23:51

## 2024-05-05 RX ADMIN — OXYCODONE HYDROCHLORIDE AND ACETAMINOPHEN 1 TABLET: 10; 325 TABLET ORAL at 06:05

## 2024-05-05 RX ADMIN — BACLOFEN 10 MG: 10 TABLET ORAL at 06:05

## 2024-05-05 RX ADMIN — DICLOFENAC SODIUM 4 G: 10 GEL TOPICAL at 18:36

## 2024-05-05 RX ADMIN — PREGABALIN 100 MG: 100 CAPSULE ORAL at 20:45

## 2024-05-05 RX ADMIN — DICLOFENAC SODIUM 4 G: 10 GEL TOPICAL at 11:37

## 2024-05-05 RX ADMIN — INSULIN DETEMIR 40 UNITS: 100 INJECTION, SOLUTION SUBCUTANEOUS at 20:45

## 2024-05-05 RX ADMIN — LISINOPRIL 2.5 MG: 2.5 TABLET ORAL at 08:18

## 2024-05-05 RX ADMIN — ATORVASTATIN CALCIUM 10 MG: 10 TABLET, FILM COATED ORAL at 20:44

## 2024-05-05 RX ADMIN — IBUPROFEN 400 MG: 400 TABLET ORAL at 11:43

## 2024-05-05 RX ADMIN — PREGABALIN 100 MG: 100 CAPSULE ORAL at 08:18

## 2024-05-05 RX ADMIN — OXYCODONE HYDROCHLORIDE AND ACETAMINOPHEN 1 TABLET: 10; 325 TABLET ORAL at 23:51

## 2024-05-05 RX ADMIN — FERROUS SULFATE TAB 325 MG (65 MG ELEMENTAL FE) 325 MG: 325 (65 FE) TAB at 08:18

## 2024-05-05 RX ADMIN — EMPAGLIFLOZIN 10 MG: 10 TABLET, FILM COATED ORAL at 08:18

## 2024-05-05 RX ADMIN — CYANOCOBALAMIN TAB 500 MCG 1000 MCG: 500 TAB at 08:18

## 2024-05-05 RX ADMIN — DICLOFENAC SODIUM 4 G: 10 GEL TOPICAL at 08:18

## 2024-05-05 RX ADMIN — Medication 500 MG: at 08:18

## 2024-05-05 RX ADMIN — OXYCODONE HYDROCHLORIDE AND ACETAMINOPHEN 1 TABLET: 10; 325 TABLET ORAL at 13:41

## 2024-05-05 RX ADMIN — ACETAMINOPHEN 650 MG: 325 TABLET ORAL at 11:43

## 2024-05-05 RX ADMIN — Medication 10 MG: at 20:43

## 2024-05-05 RX ADMIN — INSULIN DETEMIR 40 UNITS: 100 INJECTION, SOLUTION SUBCUTANEOUS at 08:17

## 2024-05-05 NOTE — PLAN OF CARE
Goal Outcome Evaluation:  Plan of Care Reviewed With: patient        Progress: improving  Outcome Evaluation: Alert and oriented x4; able to make needs known to staff. Baclofen and Percocet administered for pain as needed for left hip pain; see eMAR. Continues to refuse turns but assists to use bedpan or with turning after incontinent episodes. Uses urinal independently. Refused skin care to abdominal folds, groin, and buttock. Dressings to right foot and left heel dry and intact. Call light within reach; care plan ongoing.

## 2024-05-05 NOTE — PROGRESS NOTES
[facility]   Hospitalist Progress Note  Date: 2024  Patient Name: Jose Shaikh  : 1960  MRN: [id number]  Date of admission: 2023      Subjective   Subjective     Chief Complaint: Weakness    Summary: Jose Shaikh is a 63 y.o. male  initially hospitalized on 9/10/2023, prolonged hospitalization for treatment of management of generalized weakness, deconditioning, with acute issues of diarrhea, C. difficile negative.  Diarrhea improved.  Had small hematoma after ambulating on his foot.  Unfortunately with exhaustive efforts to try to place this gentleman after 39 days of hospitalization, we are unable to find a facility that will accept him.  He has no further acute needs or requirements for inpatient monitoring and management, his labs and vitals are stable, he is tolerating oral intake, and has been refusing turns and repositionings and other interventions with nursing staff despite recommendations.  Patient discharged in hemodynamically stable condition on 10/19/2023 to follow-up with PCP within 1 week. Unfortunately we cannot solve all of his social issues during this hospitalization due to lack of resources and participation from the patient's perspective despite all our efforts.  Patient has a financial means of making arrangements at home.  Patient appealed his discharge.  Lost his appeal.  LCD: 10/20/23, PFL beginning: 10/21/23 @ 12pm.  Due to an inability to place the patient in a.m. nursing home he has been placed in our skilled nursing facility until arrangements can be made or until he is able to care for himself.      Interval Followup: 2024    Working with PT  Losing some weight  Tolerating diuretic.  Less edema.  Tolerating Ozempic  No new complaints      Review of systems: All systems reviewed and negative except what is noted above in interval follow-up   Objective   Objective     Vitals:   Temp:  [97.7 °F (36.5 °C)] 97.7 °F (36.5 °C)  Heart Rate:  [71-75] 71  Resp:   [16-18] 16  BP: ()/(56-61) 102/56    Physical Exam   Constitutional: No distress.  Alert and conversational.  Respiratory: No wheeze noted.  GI: Abdomen: Obese  Neuro/psych:  Calm mood.  No dysarthria.  Extremities: Some lower extremity edema noted    Result Review    Result Review:  I have personally reviewed the results from the time of this admission to 5/5/2024 15:10 EDT and agree with these findings:  [x]  Laboratory  [x]  Microbiology  []  Radiology  []  EKG/Telemetry   []  Cardiology/Vascular   []  Pathology  []  Old records  []  Other:    Assessment & Plan   Assessment / Plan   Assessment:  Hospital-acquired weakness  Hx of Influenza A  Hx of C. difficile diarrhea treated  Generalized weakness  Deconditioning  DM (Kami Garcia and PCP)  HTN  PAF (on Eliquis; previously seen Dr. Duval)  Morbid obesity with BMI 44  Debility with wheelchair dependence  Hx of severe left hip pain  Severe degenerative joint disease of lower extremities  Hx L calcaneus osteomyelitis  Chronic venous stasis  Hx R transmetatarsal  Chronic bilateral foot wounds with right transmetatarsal amputation, healing by secondary intention (wound care clinic; podiatrist Gordon)  Thrombocytopenia, resolved      Plan:    Tolerating Lasix.  Will continue.  Recheck BMP.  Also A1c.  Continue PT/OT  As he continues to lose weight on Ozempic, he may be a candidate for surgical options  Appreciate input from wound care consult  As needed Lasix  Continue basal insulin and SSI per protocol titrate as needed  Continue Eliquis for stroke prophylaxis  Continue probiotics  Discussed with RN    DVT prophylaxis:  Medical DVT prophylaxis orders are present.    CODE STATUS:   Code Status (Patient has no pulse and is not breathing): CPR (Attempt to Resuscitate)  Medical Interventions (Patient has pulse or is breathing): Full Support

## 2024-05-05 NOTE — PLAN OF CARE
Goal Outcome Evaluation:  Plan of Care Reviewed With: patient        Progress: improving       Patient alert and oriented x4.patient is able to  make needs known to staff. Used bedpan for bowel movements this shift.  PRN Baclofen and Percocet, given x1 for hip pain this shift. Call light and personal items within reach. Continue with current plan of care.

## 2024-05-06 LAB
ANION GAP SERPL CALCULATED.3IONS-SCNC: 7.1 MMOL/L (ref 5–15)
BASOPHILS # BLD AUTO: 0.03 10*3/MM3 (ref 0–0.2)
BASOPHILS NFR BLD AUTO: 0.7 % (ref 0–1.5)
BUN SERPL-MCNC: 15 MG/DL (ref 8–23)
BUN/CREAT SERPL: 32.6 (ref 7–25)
CALCIUM SPEC-SCNC: 8.3 MG/DL (ref 8.6–10.5)
CHLORIDE SERPL-SCNC: 106 MMOL/L (ref 98–107)
CO2 SERPL-SCNC: 23.9 MMOL/L (ref 22–29)
CREAT SERPL-MCNC: 0.46 MG/DL (ref 0.76–1.27)
DEPRECATED RDW RBC AUTO: 48.8 FL (ref 37–54)
EGFRCR SERPLBLD CKD-EPI 2021: 116.1 ML/MIN/1.73
EOSINOPHIL # BLD AUTO: 0.11 10*3/MM3 (ref 0–0.4)
EOSINOPHIL NFR BLD AUTO: 2.7 % (ref 0.3–6.2)
ERYTHROCYTE [DISTWIDTH] IN BLOOD BY AUTOMATED COUNT: 15.3 % (ref 12.3–15.4)
GLUCOSE BLDC GLUCOMTR-MCNC: 111 MG/DL (ref 70–99)
GLUCOSE BLDC GLUCOMTR-MCNC: 161 MG/DL (ref 70–99)
GLUCOSE BLDC GLUCOMTR-MCNC: 169 MG/DL (ref 70–99)
GLUCOSE BLDC GLUCOMTR-MCNC: 88 MG/DL (ref 70–99)
GLUCOSE SERPL-MCNC: 83 MG/DL (ref 65–99)
HBA1C MFR BLD: 5 % (ref 4.8–5.6)
HCT VFR BLD AUTO: 37.8 % (ref 37.5–51)
HGB BLD-MCNC: 11.9 G/DL (ref 13–17.7)
IMM GRANULOCYTES # BLD AUTO: 0.01 10*3/MM3 (ref 0–0.05)
IMM GRANULOCYTES NFR BLD AUTO: 0.2 % (ref 0–0.5)
LYMPHOCYTES # BLD AUTO: 1.18 10*3/MM3 (ref 0.7–3.1)
LYMPHOCYTES NFR BLD AUTO: 29.1 % (ref 19.6–45.3)
MAGNESIUM SERPL-MCNC: 1.9 MG/DL (ref 1.6–2.4)
MCH RBC QN AUTO: 27.7 PG (ref 26.6–33)
MCHC RBC AUTO-ENTMCNC: 31.5 G/DL (ref 31.5–35.7)
MCV RBC AUTO: 87.9 FL (ref 79–97)
MONOCYTES # BLD AUTO: 0.56 10*3/MM3 (ref 0.1–0.9)
MONOCYTES NFR BLD AUTO: 13.8 % (ref 5–12)
NEUTROPHILS NFR BLD AUTO: 2.17 10*3/MM3 (ref 1.7–7)
NEUTROPHILS NFR BLD AUTO: 53.5 % (ref 42.7–76)
NRBC BLD AUTO-RTO: 0 /100 WBC (ref 0–0.2)
PLATELET # BLD AUTO: 92 10*3/MM3 (ref 140–450)
PMV BLD AUTO: 12.5 FL (ref 6–12)
POTASSIUM SERPL-SCNC: 4.2 MMOL/L (ref 3.5–5.2)
RBC # BLD AUTO: 4.3 10*6/MM3 (ref 4.14–5.8)
SODIUM SERPL-SCNC: 137 MMOL/L (ref 136–145)
WBC NRBC COR # BLD AUTO: 4.06 10*3/MM3 (ref 3.4–10.8)

## 2024-05-06 PROCEDURE — 97110 THERAPEUTIC EXERCISES: CPT

## 2024-05-06 PROCEDURE — 80048 BASIC METABOLIC PNL TOTAL CA: CPT | Performed by: PHYSICIAN ASSISTANT

## 2024-05-06 PROCEDURE — 82948 REAGENT STRIP/BLOOD GLUCOSE: CPT

## 2024-05-06 PROCEDURE — 83036 HEMOGLOBIN GLYCOSYLATED A1C: CPT | Performed by: PHYSICIAN ASSISTANT

## 2024-05-06 PROCEDURE — 82948 REAGENT STRIP/BLOOD GLUCOSE: CPT | Performed by: INTERNAL MEDICINE

## 2024-05-06 PROCEDURE — 83735 ASSAY OF MAGNESIUM: CPT | Performed by: PHYSICIAN ASSISTANT

## 2024-05-06 PROCEDURE — 85025 COMPLETE CBC W/AUTO DIFF WBC: CPT | Performed by: PHYSICIAN ASSISTANT

## 2024-05-06 PROCEDURE — 63710000001 INSULIN DETEMIR PER 5 UNITS: Performed by: PHYSICIAN ASSISTANT

## 2024-05-06 PROCEDURE — 63710000001 INSULIN LISPRO (HUMAN) PER 5 UNITS: Performed by: INTERNAL MEDICINE

## 2024-05-06 RX ADMIN — SERTRALINE HYDROCHLORIDE 50 MG: 50 TABLET ORAL at 20:22

## 2024-05-06 RX ADMIN — BACLOFEN 10 MG: 10 TABLET ORAL at 17:39

## 2024-05-06 RX ADMIN — LISINOPRIL 2.5 MG: 2.5 TABLET ORAL at 08:09

## 2024-05-06 RX ADMIN — INSULIN LISPRO 4 UNITS: 100 INJECTION, SOLUTION INTRAVENOUS; SUBCUTANEOUS at 17:39

## 2024-05-06 RX ADMIN — FERROUS SULFATE TAB 325 MG (65 MG ELEMENTAL FE) 325 MG: 325 (65 FE) TAB at 08:09

## 2024-05-06 RX ADMIN — PREGABALIN 100 MG: 100 CAPSULE ORAL at 16:23

## 2024-05-06 RX ADMIN — INSULIN LISPRO 4 UNITS: 100 INJECTION, SOLUTION INTRAVENOUS; SUBCUTANEOUS at 20:23

## 2024-05-06 RX ADMIN — Medication 500 MG: at 08:09

## 2024-05-06 RX ADMIN — Medication 500 MG: at 20:23

## 2024-05-06 RX ADMIN — Medication 10 MG: at 20:22

## 2024-05-06 RX ADMIN — PREGABALIN 100 MG: 100 CAPSULE ORAL at 20:22

## 2024-05-06 RX ADMIN — ATORVASTATIN CALCIUM 10 MG: 10 TABLET, FILM COATED ORAL at 20:22

## 2024-05-06 RX ADMIN — FUROSEMIDE 40 MG: 40 TABLET ORAL at 08:09

## 2024-05-06 RX ADMIN — DICLOFENAC SODIUM 4 G: 10 GEL TOPICAL at 11:36

## 2024-05-06 RX ADMIN — PREGABALIN 100 MG: 100 CAPSULE ORAL at 08:09

## 2024-05-06 RX ADMIN — BACLOFEN 10 MG: 10 TABLET ORAL at 08:18

## 2024-05-06 RX ADMIN — IBUPROFEN 400 MG: 400 TABLET ORAL at 03:46

## 2024-05-06 RX ADMIN — INSULIN DETEMIR 40 UNITS: 100 INJECTION, SOLUTION SUBCUTANEOUS at 20:23

## 2024-05-06 RX ADMIN — INSULIN DETEMIR 40 UNITS: 100 INJECTION, SOLUTION SUBCUTANEOUS at 08:08

## 2024-05-06 RX ADMIN — DICLOFENAC SODIUM 4 G: 10 GEL TOPICAL at 17:39

## 2024-05-06 RX ADMIN — OXYCODONE HYDROCHLORIDE AND ACETAMINOPHEN 1 TABLET: 10; 325 TABLET ORAL at 17:39

## 2024-05-06 RX ADMIN — DICLOFENAC SODIUM 4 G: 10 GEL TOPICAL at 20:24

## 2024-05-06 RX ADMIN — ACETAMINOPHEN 650 MG: 325 TABLET ORAL at 23:11

## 2024-05-06 RX ADMIN — OXYCODONE HYDROCHLORIDE AND ACETAMINOPHEN 1 TABLET: 10; 325 TABLET ORAL at 08:18

## 2024-05-06 RX ADMIN — DICLOFENAC SODIUM 4 G: 10 GEL TOPICAL at 08:09

## 2024-05-06 RX ADMIN — APIXABAN 5 MG: 5 TABLET, FILM COATED ORAL at 20:22

## 2024-05-06 RX ADMIN — CYANOCOBALAMIN TAB 500 MCG 1000 MCG: 500 TAB at 08:09

## 2024-05-06 RX ADMIN — APIXABAN 5 MG: 5 TABLET, FILM COATED ORAL at 08:09

## 2024-05-06 RX ADMIN — EMPAGLIFLOZIN 10 MG: 10 TABLET, FILM COATED ORAL at 08:09

## 2024-05-06 NOTE — THERAPY TREATMENT NOTE
SNF - Physical Therapy Treatment Note  KE[facility] Dominguez     Patient Name: Jose Shaikh  : 1960  MRN: [id number]  Today's Date: 2024      Visit Dx:    ICD-10-CM ICD-9-CM   1. Difficulty in walking  R26.2 719.7   2. Type 2 diabetes mellitus with other specified complication, unspecified whether long term insulin use  E11.69 250.80     Patient Active Problem List   Diagnosis    Diabetic ulcer of left heel associated with type 2 DM    Acute osteomyelitis of left calcaneus     Diabetic ulcer of left heel associated with type 2 DM    Diabetic ulcer of right midfoot associated with type 2 DM    Paroxysmal atrial fibrillation    Essential hypertension    Hyperlipidemia LDL goal <100    Cellulitis and abscess of foot    High alkaline phosphatase level    Osteomyelitis    Onychomycosis    Onychocryptosis    Foot pain, bilateral    Osteomyelitis of foot, right, acute    Cellulitis of right foot    Type 2 diabetes mellitus, with long-term current use of insulin    Class 3 severe obesity due to excess calories with serious comorbidity and body mass index (BMI) of 45.0 to 49.9 in adult    Anxiety disorder, unspecified    Claustrophobia    Dependence on wheelchair    Depression, unspecified    Long term (current) use of anticoagulants    Long term (current) use of oral hypoglycemic drugs    Wound of foot    Ulcer of right foot    Orthostatic hypotension    Other chronic osteomyelitis, right ankle and foot    Personal history of nicotine dependence    Thrombocytopenia, unspecified    Unspecified open wound, right foot, initial encounter    Diabetic foot infection    Subacute osteomyelitis of right foot    Right foot pain    Sepsis    Onychomycosis    Foot pain, left    Impaired mobility and ADLs    Absence of toe of right foot    Corns and callosity    Disability of walking    Fracture    Limb swelling    Polyneuropathy    Pressure ulcer, stage 1    Shortness of breath    Generalized weakness    Debility    Ulcerated, foot,  left, limited to breakdown of skin    Onychomycosis     Past Medical History:   Diagnosis Date    Absence of toe of right foot     Acute osteomyelitis of left calcaneus  08/18/2021    Anxiety and depression     Arthritis     Cancer     Chronic pain     STATES HIS PAIN IS 10/10 AAT    Claustrophobia     Corns and callus     Diabetic ulcer of left heel associated with type 2 DM 08/18/2021    Diabetic ulcer of left heel associated with type 2 DM 07/06/2021    Diabetic ulcer of right midfoot associated with type 2 DM 08/18/2021    Difficulty walking     Essential hypertension 08/31/2021    Hammertoe     Hyperlipidemia LDL goal <100 08/31/2021    Ingrown toenail     Obesity     Paroxysmal atrial fibrillation 08/31/2021    Polyneuropathy     Pressure ulcer, stage 1     Tinea unguium     Type 2 diabetes mellitus with polyneuropathy      Past Surgical History:   Procedure Laterality Date    CYST REMOVAL      center of back; benign    EYE SURGERY      INCISION AND DRAINAGE ABSCESS      back    INCISION AND DRAINAGE LEG Right 12/10/2021    Procedure: INCISION AND DRAINAGE LOWER EXTREMITY;  Surgeon: Ash Leyva DPM;  Location: [facility];  Service: Podiatry;  Laterality: Right;    OTHER SURGICAL HISTORY      Surgical clips left foot    TOE SURGERY Right     Removal of 5th toe    TRANS METATARSAL AMPUTATION Right 12/02/2021    Procedure: AMPUTATION TRANS METATARSAL;  Surgeon: Ash Leyva DPM;  Location: [facility];  Service: Podiatry;  Laterality: Right;    VASCULAR SURGERY      WRIST SURGERY Left     repair of injury       PT Assessment (Last 12 Hours)       PT Evaluation and Treatment       Row Name 05/06/24 2670          Physical Therapy Time and Intention    Subjective Information complains of;pain  -WM     Document Type therapy note (daily note)  -WM     Mode of Treatment individual therapy;physical therapy  -WM     Patient Effort good  -WM     Symptoms Noted During/After Treatment fatigue   -       Row Name 05/06/24 1106          Pain    Pretreatment Pain Rating 7/10  -     Posttreatment Pain Rating 7/10  -     Pain Location - Side/Orientation Left  -     Pain Location - hip;knee;shoulder  -       Row Name 05/06/24 1106          Hip (Therapeutic Exercise)    Hip AROM (Therapeutic Exercise) right;aBduction;aDduction;supine;30 repititions  -     Hip AAROM (Therapeutic Exercise) left;aBduction;aDduction;supine;10 repetitions;5 repetitions;2 sets  -     Hip Isometrics (Therapeutic Exercise) bilateral;gluteal sets;supine;10 repetitions;5 repetitions;3 second hold;2 sets  -       Row Name 05/06/24 1106          Knee (Therapeutic Exercise)    Knee AROM (Therapeutic Exercise) bilateral;SAQ (short arc quad);supine;30 repititions  -     Knee Isometrics (Therapeutic Exercise) bilateral;quad sets;supine;10 repetitions;5 repetitions;3 second hold;2 sets  -       Row Name 05/06/24 1106          Ankle (Therapeutic Exercise)    Ankle AROM (Therapeutic Exercise) bilateral;dorsiflexion;plantarflexion;supine;30 repititions  -       Row Name             Wound 03/27/24 1045 Right anterior Skin Tear    Wound - Properties Group Placement Date: 03/27/24  -CR Placement Time: 1045  -CR Side: Right  -CR Orientation: anterior  -CR Primary Wound Type: Skin tear  -CR    Retired Wound - Properties Group Placement Date: 03/27/24  -CR Placement Time: 1045  -CR Side: Right  -CR Orientation: anterior  -CR Primary Wound Type: Skin tear  -CR    Retired Wound - Properties Group Date first assessed: 03/27/24  -CR Time first assessed: 1045  -CR Side: Right  -CR Primary Wound Type: Skin tear  -CR      Row Name             Wound 03/28/24 0309 Other (See comments) medial coccyx MASD (Moisture associated skin damage)    Wound - Properties Group Placement Date: 03/28/24  -DS Placement Time: 0309  -DS Present on Original Admission: N  -DS Side: Other (See comments)  -DS, medial  Orientation: medial  -DS Location: coccyx  -DS  Primary Wound Type: MASD  -DS    Retired Wound - Properties Group Placement Date: 03/28/24  -DS Placement Time: 0309  -DS Present on Original Admission: N  -DS Side: Other (See comments)  -DS, medial  Orientation: medial  -DS Location: coccyx  -DS Primary Wound Type: MASD  -DS    Retired Wound - Properties Group Date first assessed: 03/28/24  -DS Time first assessed: 0309  -DS Present on Original Admission: N  -DS Side: Other (See comments)  -DS, medial  Location: coccyx  -DS Primary Wound Type: MASD  -DS      Row Name             Wound 03/28/24 1225 Right medial ankle Traumatic    Wound - Properties Group Placement Date: 03/28/24  -FP Placement Time: 1225  -FP Side: Right  -FP Orientation: medial  -FP Location: ankle  -FP Primary Wound Type: Traumatic  -FP    Retired Wound - Properties Group Placement Date: 03/28/24  -FP Placement Time: 1225  -FP Side: Right  -FP Orientation: medial  -FP Location: ankle  -FP Primary Wound Type: Traumatic  -FP    Retired Wound - Properties Group Date first assessed: 03/28/24  -FP Time first assessed: 1225  -FP Side: Right  -FP Location: ankle  -FP Primary Wound Type: Traumatic  -FP      Row Name             Wound 04/03/24 1730 Right anterior hip MASD (Moisture associated skin damage)    Wound - Properties Group Placement Date: 04/03/24  -FL Placement Time: 1730  -FL Side: Right  -FL Orientation: anterior  -FL Location: hip  -FL Primary Wound Type: MASD  -FL    Retired Wound - Properties Group Placement Date: 04/03/24  -FL Placement Time: 1730  -FL Side: Right  -FL Orientation: anterior  -FL Location: hip  -FL Primary Wound Type: MASD  -FL    Retired Wound - Properties Group Date first assessed: 04/03/24  -FL Time first assessed: 1730  -FL Side: Right  -FL Location: hip  -FL Primary Wound Type: MASD  -FL      Row Name             Wound 05/02/24 1033 Left posterior plantar    Wound - Properties Group Placement Date: 05/02/24  -KR Placement Time: 1033  -KR Side: Left  -KR Orientation:  posterior  -KR Location: plantar  -KR    Retired Wound - Properties Group Placement Date: 05/02/24  -KR Placement Time: 1033 -KR Side: Left  -KR Orientation: posterior  -KR Location: plantar  -KR    Retired Wound - Properties Group Date first assessed: 05/02/24  -KR Time first assessed: 1033  -KR Side: Left  -KR Location: plantar  -KR      Row Name 05/06/24 1106          Positioning and Restraints    Pre-Treatment Position in bed  -WM     Post Treatment Position bed  -WM     In Bed fowlers;call light within reach;encouraged to call for assist  -WM       Row Name 05/06/24 1106          Progress Summary (PT)    Progress Toward Functional Goals (PT) progress toward functional goals is fair  -WM     Daily Progress Summary (PT) Pt c/o left shoulder and elbow pain and declined transfers.  -WM               User Key  (r) = Recorded By, (t) = Taken By, (c) = Cosigned By      Initials Name Provider Type    Linda Hickman, RN Registered Nurse    Bettye Anand, RN Registered Nurse    Nicole Elise RN Registered Nurse    Adilson Carmona LPN Licensed Nurse    Carson Johnson PTA Physical Therapist Assistant    Almaz Morales RN Registered Nurse                  Section G              Section GG                       Physical Therapy Education       Title: PT OT SLP Therapies (Done)       Topic: Physical Therapy (Done)       Point: Mobility training (Done)       Learning Progress Summary             Patient Acceptance, E,D, DU by PG at 4/24/2024 1039    Acceptance, E,TB, VU by RM at 2/14/2024 0518    Acceptance, E,TB, VU by RM at 1/9/2024 0420    Acceptance, E, VU by CR at 12/30/2023 1750    Acceptance, E, VU by CR at 12/20/2023 1345    Acceptance, E, VU by CR at 12/19/2023 1004    Acceptance, E,TB, VU by RM at 11/30/2023 0420    Acceptance, E,TB, VU by MOE at 11/8/2023 1341                         Point: Precautions (Done)       Learning Progress Summary             Patient Acceptance, E,D, DU by PG  at 4/24/2024 1039    Acceptance, E,TB, VU by RM at 2/14/2024 0518    Acceptance, E,TB, VU by RM at 1/9/2024 0420    Acceptance, E, VU by CR at 12/30/2023 1750    Acceptance, E,TB, VU by RM at 11/30/2023 0420    Acceptance, E,TB, VU by MOE at 11/8/2023 1341                                         User Key       Initials Effective Dates Name Provider Type Discipline     06/08/21 -  Bharath Berg, RN Registered Nurse Nurse    CR 06/13/22 -  Adilson Baker LPN Licensed Nurse Nurse    PG 06/16/21 -  Kodak Matos OT Occupational Therapist OT    MOE 06/03/21 -  Merlin Rao PT Physical Therapist PT                  PT Recommendation and Plan     Progress Summary (PT)  Progress Toward Functional Goals (PT): progress toward functional goals is fair  Daily Progress Summary (PT): Pt c/o left shoulder and elbow pain and declined transfers.   Outcome Measures       Row Name 05/06/24 1124 05/04/24 1300 05/03/24 1300       How much help from another person do you currently need...    Turning from your back to your side while in flat bed without using bedrails? 3  -WM 3  -CS 3  -MOE    Moving from lying on back to sitting on the side of a flat bed without bedrails? 3  -WM 3  -CS 3  -MOE    Moving to and from a bed to a chair (including a wheelchair)? 2  -WM 2  -CS 2  -MOE    Standing up from a chair using your arms (e.g., wheelchair, bedside chair)? 2  -WM 2  -CS 2  -MOE    Climbing 3-5 steps with a railing? 1  -WM 1  -CS 1  -MOE    To walk in hospital room? 2  -WM 2  -CS 2  -MOE    AM-PAC 6 Clicks Score (PT) 13  -WM 13  -CS 13  -MOE    Highest Level of Mobility Goal 4 --> Transfer to chair/commode  -WM 4 --> Transfer to chair/commode  -CS 4 --> Transfer to chair/commode  -MOE       Functional Assessment    Outcome Measure Options -- AM-PAC 6 Clicks Basic Mobility (PT)  -CS AM-PAC 6 Clicks Basic Mobility (PT)  -MOE      Row Name 05/03/24 6327             How much help from another person do you currently need...    Turning from your  back to your side while in flat bed without using bedrails? 3  -WM      Moving from lying on back to sitting on the side of a flat bed without bedrails? 2  -WM      Moving to and from a bed to a chair (including a wheelchair)? 2  -WM      Standing up from a chair using your arms (e.g., wheelchair, bedside chair)? 2  -WM      Climbing 3-5 steps with a railing? 1  -WM      To walk in hospital room? 2  -WM      AM-PAC 6 Clicks Score (PT) 12  -WM      Highest Level of Mobility Goal 4 --> Transfer to chair/commode  -WM                User Key  (r) = Recorded By, (t) = Taken By, (c) = Cosigned By      Initials Name Provider Type    Carson Johnson PTA Physical Therapist Assistant    Merlin De La O, PT Physical Therapist    Ronald Huerta PTA Physical Therapist Assistant                     Time Calculation:    PT Charges       Row Name 05/06/24 1105             Time Calculation    PT Received On 05/06/24  -WM         Timed Charges    02096 - PT Therapeutic Exercise Minutes 24  -WM         SNF Physical Therapy Minutes    Skilled Minutes- PT 24 min  -WM         Total Minutes    Timed Charges Total Minutes 24  -WM       Total Minutes 24  -WM                User Key  (r) = Recorded By, (t) = Taken By, (c) = Cosigned By      Initials Name Provider Type    Carson Johnson PTA Physical Therapist Assistant                      PT G-Codes  Outcome Measure Options: AM-PAC 6 Clicks Basic Mobility (PT)  AM-PAC 6 Clicks Score (PT): 13  AM-PAC 6 Clicks Score (OT): 16    Carson Inman PTA  5/6/2024

## 2024-05-06 NOTE — THERAPY TREATMENT NOTE
SNF - Occupational Therapy Treatment Note  KEO Dominguez    Patient Name: Jose Shaikh  : 1960    MRN: [id number]                              Today's Date: 2024       Admit Date: 2023    Visit Dx:     ICD-10-CM ICD-9-CM   1. Difficulty in walking  R26.2 719.7   2. Type 2 diabetes mellitus with other specified complication, unspecified whether long term insulin use  E11.69 250.80     Patient Active Problem List   Diagnosis    Diabetic ulcer of left heel associated with type 2 DM    Acute osteomyelitis of left calcaneus     Diabetic ulcer of left heel associated with type 2 DM    Diabetic ulcer of right midfoot associated with type 2 DM    Paroxysmal atrial fibrillation    Essential hypertension    Hyperlipidemia LDL goal <100    Cellulitis and abscess of foot    High alkaline phosphatase level    Osteomyelitis    Onychomycosis    Onychocryptosis    Foot pain, bilateral    Osteomyelitis of foot, right, acute    Cellulitis of right foot    Type 2 diabetes mellitus, with long-term current use of insulin    Class 3 severe obesity due to excess calories with serious comorbidity and body mass index (BMI) of 45.0 to 49.9 in adult    Anxiety disorder, unspecified    Claustrophobia    Dependence on wheelchair    Depression, unspecified    Long term (current) use of anticoagulants    Long term (current) use of oral hypoglycemic drugs    Wound of foot    Ulcer of right foot    Orthostatic hypotension    Other chronic osteomyelitis, right ankle and foot    Personal history of nicotine dependence    Thrombocytopenia, unspecified    Unspecified open wound, right foot, initial encounter    Diabetic foot infection    Subacute osteomyelitis of right foot    Right foot pain    Sepsis    Onychomycosis    Foot pain, left    Impaired mobility and ADLs    Absence of toe of right foot    Corns and callosity    Disability of walking    Fracture    Limb swelling    Polyneuropathy    Pressure ulcer, stage 1    Shortness of  breath    Generalized weakness    Debility    Ulcerated, foot, left, limited to breakdown of skin    Onychomycosis     Past Medical History:   Diagnosis Date    Absence of toe of right foot     Acute osteomyelitis of left calcaneus  08/18/2021    Anxiety and depression     Arthritis     Cancer     Chronic pain     STATES HIS PAIN IS 10/10 AAT    Claustrophobia     Corns and callus     Diabetic ulcer of left heel associated with type 2 DM 08/18/2021    Diabetic ulcer of left heel associated with type 2 DM 07/06/2021    Diabetic ulcer of right midfoot associated with type 2 DM 08/18/2021    Difficulty walking     Essential hypertension 08/31/2021    Hammertoe     Hyperlipidemia LDL goal <100 08/31/2021    Ingrown toenail     Obesity     Paroxysmal atrial fibrillation 08/31/2021    Polyneuropathy     Pressure ulcer, stage 1     Tinea unguium     Type 2 diabetes mellitus with polyneuropathy      Past Surgical History:   Procedure Laterality Date    CYST REMOVAL      center of back; benign    EYE SURGERY      INCISION AND DRAINAGE ABSCESS      back    INCISION AND DRAINAGE LEG Right 12/10/2021    Procedure: INCISION AND DRAINAGE LOWER EXTREMITY;  Surgeon: Ash Leyva DPM;  Location: [facility] OR;  Service: Podiatry;  Laterality: Right;    OTHER SURGICAL HISTORY      Surgical clips left foot    TOE SURGERY Right     Removal of 5th toe    TRANS METATARSAL AMPUTATION Right 12/02/2021    Procedure: AMPUTATION TRANS METATARSAL;  Surgeon: Ash Leyva DPM;  Location: [facility] OR;  Service: Podiatry;  Laterality: Right;    VASCULAR SURGERY      WRIST SURGERY Left     repair of injury      General Information       Row Name 05/06/24 1317          OT Time and Intention    Document Type therapy note (daily note)  -LF     Mode of Treatment individual therapy;occupational therapy  -LF               User Key  (r) = Recorded By, (t) = Taken By, (c) = Cosigned By      Initials Name Provider Type    LF  Michelle Howard OT Occupational Therapist                     Mobility/ADL's    No documentation.                  Obj/Interventions       Row Name 05/06/24 1318          Shoulder (Therapeutic Exercise)    Shoulder (Therapeutic Exercise) strengthening exercise  -LF     Shoulder Strengthening (Therapeutic Exercise) right;resistance band;green  Overhead reach, foreware reach, and horizontal abduction/adduction 3 sets x 25 reps  -LF       Row Name 05/06/24 1318          Elbow/Forearm (Therapeutic Exercise)    Elbow/Forearm (Therapeutic Exercise) strengthening exercise  -LF     Elbow/Forearm Strengthening (Therapeutic Exercise) right;flexion;extension;resistance band;green;other (see comments)  3 sets x 25 reps  -LF       Row Name 05/06/24 1318          Motor Skills    Motor Skills functional endurance  -LF     Functional Endurance Fair+  -LF     Therapeutic Exercise shoulder;elbow/forearm  -LF               User Key  (r) = Recorded By, (t) = Taken By, (c) = Cosigned By      Initials Name Provider Type     Michelle Howard OT Occupational Therapist                   Goals/Plan    No documentation.                  Clinical Impression       Row Name 05/06/24 1319          Pain Assessment    Additional Documentation Pain Scale: FACES Pre/Post-Treatment (Group)  -LF       Row Name 05/06/24 1319          Pain Scale: FACES Pre/Post-Treatment    Pain: FACES Scale, Pretreatment 2-->hurts little bit  -LF     Posttreatment Pain Rating 2-->hurts little bit  -LF     Pain Location - Side/Orientation Left  -LF     Pain Location - shoulder  -LF       Row Name 05/06/24 1319          Plan of Care Review    Plan of Care Reviewed With patient  -LF     Progress improving  -LF     Outcome Evaluation Pt declined fuctional transfer/mobility training this session d/t L shoulder pain (nursing aware and possible imaging to be done) but was agreeable to RUE exercises in bed, demonstrating fair+ endurance. He would benefit from continued OT  services to maximize independence.  -LF       Row Name 05/06/24 1319          Vital Signs    O2 Delivery Pre Treatment room air  -LF     O2 Delivery Intra Treatment room air  -LF     O2 Delivery Post Treatment room air  -LF       Row Name 05/06/24 1319          Positioning and Restraints    Pre-Treatment Position in bed  -LF     Post Treatment Position bed  -LF     In Bed fowlers;call light within reach;encouraged to call for assist  -LF               User Key  (r) = Recorded By, (t) = Taken By, (c) = Cosigned By      Initials Name Provider Type     Michelle Howard OT Occupational Therapist                   Outcome Measures       Row Name 05/06/24 1124 05/06/24 0848       How much help from another person do you currently need...    Turning from your back to your side while in flat bed without using bedrails? 3  -WM 3  -AA    Moving from lying on back to sitting on the side of a flat bed without bedrails? 3  -WM 3  -AA    Moving to and from a bed to a chair (including a wheelchair)? 2  -WM 2  -AA    Standing up from a chair using your arms (e.g., wheelchair, bedside chair)? 2  -WM 2  -AA    Climbing 3-5 steps with a railing? 1  -WM 1  -AA    To walk in hospital room? 2  -WM 2  -AA    AM-PAC 6 Clicks Score (PT) 13  -WM 13  -AA    Highest Level of Mobility Goal 4 --> Transfer to chair/commode  -WM 4 --> Transfer to chair/commode  -AA              User Key  (r) = Recorded By, (t) = Taken By, (c) = Cosigned By      Initials Name Provider Type    AA Uyen Chandra RN Registered Nurse    Carson Johnson PTA Physical Therapist Assistant                  Section G              Section GG                         Occupational Therapy Education       Title: PT OT SLP Therapies (Done)       Topic: Occupational Therapy (Done)       Point: ADL training (Done)       Description:   Instruct learner(s) on proper safety adaptation and remediation techniques during self care or transfers.   Instruct in proper use of assistive  devices.                  Learning Progress Summary             Patient Acceptance, E,D, DU by PG at 4/24/2024 1039    Acceptance, E,TB, VU by RM at 2/14/2024 0518    Acceptance, E,TB, VU by RM at 1/9/2024 0420    Acceptance, E, VU by CR at 12/30/2023 1750    Acceptance, E,TB, VU by RM at 11/30/2023 0420    Acceptance, E,D, DU by PG at 11/7/2023 0932                         Point: Home exercise program (Done)       Description:   Instruct learner(s) on appropriate technique for monitoring, assisting and/or progressing therapeutic exercises/activities.                  Learning Progress Summary             Patient Acceptance, E,D, DU by PG at 4/24/2024 1039    Acceptance, E,TB, VU by RM at 2/14/2024 0518    Acceptance, E,TB, VU by RM at 1/9/2024 0420    Acceptance, E, VU by CR at 12/30/2023 1750    Acceptance, E,TB, VU by RM at 11/30/2023 0420    Acceptance, E,D, DU by PG at 11/7/2023 0932                         Point: Precautions (Done)       Description:   Instruct learner(s) on prescribed precautions during self-care and functional transfers.                  Learning Progress Summary             Patient Acceptance, E,D, DU by PG at 4/24/2024 1039    Acceptance, E,TB, VU by RM at 2/14/2024 0518    Acceptance, E,TB, VU by RM at 1/9/2024 0420    Acceptance, E, VU by CR at 12/30/2023 1750    Acceptance, E,TB, VU by RM at 11/30/2023 0420    Acceptance, E,D, DU by PG at 11/7/2023 0932                         Point: Body mechanics (Done)       Description:   Instruct learner(s) on proper positioning and spine alignment during self-care, functional mobility activities and/or exercises.                  Learning Progress Summary             Patient Acceptance, E,D, DU by PG at 4/24/2024 1039    Acceptance, E,TB, VU by RM at 2/14/2024 0518    Acceptance, E,TB, VU by RM at 1/9/2024 0420    Acceptance, E, VU by CR at 12/30/2023 1750    Acceptance, E,TB, VU by RM at 11/30/2023 0420    Acceptance, E,D, DU by PG at 11/7/2023  0932                                         User Key       Initials Effective Dates Name Provider Type Discipline     06/08/21 -  Bharath Berg RN Registered Nurse Nurse    CR 06/13/22 -  Adilson Baker LPN Licensed Nurse Nurse    PG 06/16/21 -  Kodak Matos OT Occupational Therapist OT                  OT Recommendation and Plan     Plan of Care Review  Plan of Care Reviewed With: patient  Progress: improving  Outcome Evaluation: Pt declined fuctional transfer/mobility training this session d/t L shoulder pain (nursing aware and possible imaging to be done) but was agreeable to RUE exercises in bed, demonstrating fair+ endurance. He would benefit from continued OT services to maximize independence.     Time Calculation:         Time Calculation- OT       Row Name 05/06/24 1321             Time Calculation- OT    OT Received On 05/06/24  -LF      OT Goal Re-Cert Due Date 05/12/24  -LF         Timed Charges    18189 - OT Therapeutic Exercise Minutes 25  -LF         Total Minutes    Timed Charges Total Minutes 25  -LF       Total Minutes 25  -LF                User Key  (r) = Recorded By, (t) = Taken By, (c) = Cosigned By      Initials Name Provider Type    LF Michelle Howard OT Occupational Therapist                  Therapy Charges for Today       Code Description Service Date Service Provider Modifiers Qty    20593783166 HC OT THER PROC EA 15 MIN 5/6/2024 Michelle Howard OT GO 2                 Michelle Howard OT  5/6/2024

## 2024-05-06 NOTE — PLAN OF CARE
Goal Outcome Evaluation:  Plan of Care Reviewed With: patient        Progress: improving  Outcome Evaluation: Pt is AO x 4 and VSS. No significant changes this shift. Pt uses a bedpan/ urinal at night. He was medicated for pain with Baclofen, Percocet and Ibuprofen. Will continue with his plan of care. Call light and personal items within reach.

## 2024-05-06 NOTE — PLAN OF CARE
Goal Outcome Evaluation:  Plan of Care Reviewed With: patient        Progress: improving  Outcome Evaluation: Aox4, call light and personal items within reach. Able to make needs known. C/o hip and shoulder pain: medication given, postional changes done independently, PA notified. Skin barrier applied. Tolorated wound care well, along with PT/OT. Bedpan useaged, urinal at bedside. Con't plan of care

## 2024-05-07 LAB
GLUCOSE BLDC GLUCOMTR-MCNC: 115 MG/DL (ref 70–99)
GLUCOSE BLDC GLUCOMTR-MCNC: 127 MG/DL (ref 70–99)
GLUCOSE BLDC GLUCOMTR-MCNC: 141 MG/DL (ref 70–99)
GLUCOSE BLDC GLUCOMTR-MCNC: 148 MG/DL (ref 70–99)
GLUCOSE BLDC GLUCOMTR-MCNC: 163 MG/DL (ref 70–99)

## 2024-05-07 PROCEDURE — 82948 REAGENT STRIP/BLOOD GLUCOSE: CPT | Performed by: INTERNAL MEDICINE

## 2024-05-07 PROCEDURE — 82948 REAGENT STRIP/BLOOD GLUCOSE: CPT

## 2024-05-07 PROCEDURE — 99308 SBSQ NF CARE LOW MDM 20: CPT | Performed by: PHYSICIAN ASSISTANT

## 2024-05-07 PROCEDURE — 63710000001 INSULIN DETEMIR PER 5 UNITS: Performed by: PHYSICIAN ASSISTANT

## 2024-05-07 RX ADMIN — PREGABALIN 100 MG: 100 CAPSULE ORAL at 20:17

## 2024-05-07 RX ADMIN — BACLOFEN 10 MG: 10 TABLET ORAL at 01:51

## 2024-05-07 RX ADMIN — APIXABAN 5 MG: 5 TABLET, FILM COATED ORAL at 20:17

## 2024-05-07 RX ADMIN — OXYCODONE HYDROCHLORIDE AND ACETAMINOPHEN 1 TABLET: 10; 325 TABLET ORAL at 20:44

## 2024-05-07 RX ADMIN — INSULIN DETEMIR 40 UNITS: 100 INJECTION, SOLUTION SUBCUTANEOUS at 20:17

## 2024-05-07 RX ADMIN — PREGABALIN 100 MG: 100 CAPSULE ORAL at 17:36

## 2024-05-07 RX ADMIN — ACETAMINOPHEN 650 MG: 325 TABLET ORAL at 15:50

## 2024-05-07 RX ADMIN — PREGABALIN 100 MG: 100 CAPSULE ORAL at 08:55

## 2024-05-07 RX ADMIN — EMPAGLIFLOZIN 10 MG: 10 TABLET, FILM COATED ORAL at 08:55

## 2024-05-07 RX ADMIN — OXYCODONE HYDROCHLORIDE AND ACETAMINOPHEN 1 TABLET: 10; 325 TABLET ORAL at 01:50

## 2024-05-07 RX ADMIN — LISINOPRIL 2.5 MG: 2.5 TABLET ORAL at 08:55

## 2024-05-07 RX ADMIN — SERTRALINE HYDROCHLORIDE 50 MG: 50 TABLET ORAL at 20:17

## 2024-05-07 RX ADMIN — DICLOFENAC SODIUM 4 G: 10 GEL TOPICAL at 20:17

## 2024-05-07 RX ADMIN — BACLOFEN 10 MG: 10 TABLET ORAL at 10:01

## 2024-05-07 RX ADMIN — CYANOCOBALAMIN TAB 500 MCG 1000 MCG: 500 TAB at 08:55

## 2024-05-07 RX ADMIN — DICLOFENAC SODIUM 4 G: 10 GEL TOPICAL at 11:35

## 2024-05-07 RX ADMIN — Medication 500 MG: at 08:55

## 2024-05-07 RX ADMIN — DICLOFENAC SODIUM 4 G: 10 GEL TOPICAL at 17:32

## 2024-05-07 RX ADMIN — APIXABAN 5 MG: 5 TABLET, FILM COATED ORAL at 08:55

## 2024-05-07 RX ADMIN — INSULIN DETEMIR 40 UNITS: 100 INJECTION, SOLUTION SUBCUTANEOUS at 08:54

## 2024-05-07 RX ADMIN — Medication 500 MG: at 20:17

## 2024-05-07 RX ADMIN — FUROSEMIDE 40 MG: 40 TABLET ORAL at 08:55

## 2024-05-07 RX ADMIN — Medication 10 MG: at 20:17

## 2024-05-07 RX ADMIN — FERROUS SULFATE TAB 325 MG (65 MG ELEMENTAL FE) 325 MG: 325 (65 FE) TAB at 08:55

## 2024-05-07 RX ADMIN — IBUPROFEN 400 MG: 400 TABLET ORAL at 11:35

## 2024-05-07 RX ADMIN — BACLOFEN 10 MG: 10 TABLET ORAL at 20:44

## 2024-05-07 RX ADMIN — ATORVASTATIN CALCIUM 10 MG: 10 TABLET, FILM COATED ORAL at 20:17

## 2024-05-07 RX ADMIN — OXYCODONE HYDROCHLORIDE AND ACETAMINOPHEN 1 TABLET: 10; 325 TABLET ORAL at 10:01

## 2024-05-07 RX ADMIN — DICLOFENAC SODIUM 4 G: 10 GEL TOPICAL at 08:55

## 2024-05-07 RX ADMIN — HYDROCORTISONE 2.5%: 25 CREAM TOPICAL at 03:25

## 2024-05-07 NOTE — PLAN OF CARE
Goal Outcome Evaluation:  Plan of Care Reviewed With: patient        Progress: no change  Outcome Evaluation: Aox4, call light and personal items within reach. Able to make needs known. C/o hip and shoulder pain: medication given. Tolorated wound care wel. Bedpan, urinal at bedside. Con't plan of care

## 2024-05-07 NOTE — PLAN OF CARE
Goal Outcome Evaluation:  Plan of Care Reviewed With: patient        Progress: improving  Outcome Evaluation: No significant changes this shift. Pt is AO x 4 and VSS. Will continue with his plan of care. Call light and personal items within reach.

## 2024-05-07 NOTE — PROGRESS NOTES
[facility]   Hospitalist Progress Note  Date: 2024  Patient Name: Jose Shaikh  : 1960  MRN: [id number]  Date of admission: 2023      Subjective   Subjective     Chief Complaint: Weakness    Summary: Jose Shaikh is a 63 y.o. male  initially hospitalized on 9/10/2023, prolonged hospitalization for treatment of management of generalized weakness, deconditioning, with acute issues of diarrhea, C. difficile negative.  Diarrhea improved.  Had small hematoma after ambulating on his foot.  Unfortunately with exhaustive efforts to try to place this gentleman after 39 days of hospitalization, we are unable to find a facility that will accept him.  He has no further acute needs or requirements for inpatient monitoring and management, his labs and vitals are stable, he is tolerating oral intake, and has been refusing turns and repositionings and other interventions with nursing staff despite recommendations.  Patient discharged in hemodynamically stable condition on 10/19/2023 to follow-up with PCP within 1 week. Unfortunately we cannot solve all of his social issues during this hospitalization due to lack of resources and participation from the patient's perspective despite all our efforts.  Patient has a financial means of making arrangements at home.  Patient appealed his discharge.  Lost his appeal.  LCD: 10/20/23, PFL beginning: 10/21/23 @ 12pm.  Due to an inability to place the patient in a.m. nursing home he has been placed in our skilled nursing facility until arrangements can be made or until he is able to care for himself.      Interval Followup: 2024    Reports some left shoulder pain with activities.  Has history of old rotator cuff injury.  However left shoulder is nontender to deep palpation.  Discussed continuing current plan.  Conservative management.  If worsens can consider additional imaging etc.  Vital stable  Tolerating Ozempic  A1c 5  Recent BMP with normal  creatinine      Review of systems: All systems reviewed and negative except what is noted above in interval follow-up   Objective   Objective     Vitals:   Temp:  [97.2 °F (36.2 °C)] 97.2 °F (36.2 °C)  Heart Rate:  [77-78] 78  Resp:  [18] 18  BP: (108-117)/(56-60) 117/56    Physical Exam   Constitutional: No distress.  Alert and conversational.  Respiratory: No wheeze noted.  GI: Abdomen: Obese  Neuro/psych:  Calm mood.  No dysarthria.  Extremities: Some lower extremity edema noted    Result Review    Result Review:  I have personally reviewed the results from the time of this admission to 5/7/2024 15:33 EDT and agree with these findings:  [x]  Laboratory  [x]  Microbiology  []  Radiology  []  EKG/Telemetry   []  Cardiology/Vascular   []  Pathology  []  Old records  []  Other:    Assessment & Plan   Assessment / Plan   Assessment:  Hospital-acquired weakness  Hx of Influenza A  Hx of C. difficile diarrhea treated  Generalized weakness  Deconditioning  DM (Kami Garcia and PCP)  HTN  PAF (on Eliquis; previously seen Dr. Duval)  Morbid obesity with BMI 44  Debility with wheelchair dependence  Hx of severe left hip pain  Severe degenerative joint disease of lower extremities  Hx L calcaneus osteomyelitis  Chronic venous stasis  Hx R transmetatarsal  Chronic bilateral foot wounds with right transmetatarsal amputation, healing by secondary intention (wound care clinic; podiatrist Gordon)  Thrombocytopenia, resolved      Plan:    Tolerating Lasix.  Will continue.    Continue PT/OT  As he continues to lose weight on Ozempic, he may be a candidate for surgical options  Appreciate input from wound care consult  As needed Lasix  Continue basal insulin and SSI per protocol titrate as needed  Continue Eliquis for stroke prophylaxis  Continue probiotics  Discussed with RN    DVT prophylaxis:  Medical DVT prophylaxis orders are present.    CODE STATUS:   Code Status (Patient has no pulse and is not breathing): CPR (Attempt to  Resuscitate)  Medical Interventions (Patient has pulse or is breathing): Full Support

## 2024-05-07 NOTE — SIGNIFICANT NOTE
05/07/24 1400   Physical Therapy Time and Intention   Session Not Performed patient unavailable for treatment  (Pt checked on x2, on bedpan.)

## 2024-05-08 LAB
GLUCOSE BLDC GLUCOMTR-MCNC: 124 MG/DL (ref 70–99)
GLUCOSE BLDC GLUCOMTR-MCNC: 134 MG/DL (ref 70–99)
GLUCOSE BLDC GLUCOMTR-MCNC: 158 MG/DL (ref 70–99)
GLUCOSE BLDC GLUCOMTR-MCNC: 177 MG/DL (ref 70–99)
GLUCOSE BLDC GLUCOMTR-MCNC: 193 MG/DL (ref 70–99)

## 2024-05-08 PROCEDURE — 82948 REAGENT STRIP/BLOOD GLUCOSE: CPT | Performed by: INTERNAL MEDICINE

## 2024-05-08 PROCEDURE — 97530 THERAPEUTIC ACTIVITIES: CPT

## 2024-05-08 PROCEDURE — 63710000001 INSULIN LISPRO (HUMAN) PER 5 UNITS: Performed by: INTERNAL MEDICINE

## 2024-05-08 PROCEDURE — 82948 REAGENT STRIP/BLOOD GLUCOSE: CPT

## 2024-05-08 PROCEDURE — 63710000001 INSULIN DETEMIR PER 5 UNITS: Performed by: PHYSICIAN ASSISTANT

## 2024-05-08 PROCEDURE — 97116 GAIT TRAINING THERAPY: CPT

## 2024-05-08 RX ORDER — CETIRIZINE HYDROCHLORIDE 10 MG/1
10 TABLET ORAL DAILY
Status: DISPENSED | OUTPATIENT
Start: 2024-05-08

## 2024-05-08 RX ADMIN — Medication 500 MG: at 08:36

## 2024-05-08 RX ADMIN — PREGABALIN 100 MG: 100 CAPSULE ORAL at 08:36

## 2024-05-08 RX ADMIN — PREGABALIN 100 MG: 100 CAPSULE ORAL at 16:40

## 2024-05-08 RX ADMIN — CYANOCOBALAMIN TAB 500 MCG 1000 MCG: 500 TAB at 08:36

## 2024-05-08 RX ADMIN — OXYCODONE HYDROCHLORIDE AND ACETAMINOPHEN 1 TABLET: 10; 325 TABLET ORAL at 05:25

## 2024-05-08 RX ADMIN — IBUPROFEN 400 MG: 400 TABLET ORAL at 10:44

## 2024-05-08 RX ADMIN — Medication 10 MG: at 21:31

## 2024-05-08 RX ADMIN — FUROSEMIDE 40 MG: 40 TABLET ORAL at 08:36

## 2024-05-08 RX ADMIN — INSULIN LISPRO 4 UNITS: 100 INJECTION, SOLUTION INTRAVENOUS; SUBCUTANEOUS at 17:49

## 2024-05-08 RX ADMIN — HYDROCORTISONE 2.5%: 25 CREAM TOPICAL at 01:31

## 2024-05-08 RX ADMIN — DICLOFENAC SODIUM 4 G: 10 GEL TOPICAL at 08:39

## 2024-05-08 RX ADMIN — INSULIN LISPRO 4 UNITS: 100 INJECTION, SOLUTION INTRAVENOUS; SUBCUTANEOUS at 12:48

## 2024-05-08 RX ADMIN — BACLOFEN 10 MG: 10 TABLET ORAL at 05:25

## 2024-05-08 RX ADMIN — HYDROCORTISONE 2.5%: 25 CREAM TOPICAL at 22:45

## 2024-05-08 RX ADMIN — OXYCODONE HYDROCHLORIDE AND ACETAMINOPHEN 1 TABLET: 10; 325 TABLET ORAL at 14:07

## 2024-05-08 RX ADMIN — SERTRALINE HYDROCHLORIDE 50 MG: 50 TABLET ORAL at 21:32

## 2024-05-08 RX ADMIN — INSULIN DETEMIR 40 UNITS: 100 INJECTION, SOLUTION SUBCUTANEOUS at 21:32

## 2024-05-08 RX ADMIN — EMPAGLIFLOZIN 10 MG: 10 TABLET, FILM COATED ORAL at 08:36

## 2024-05-08 RX ADMIN — DICLOFENAC SODIUM 4 G: 10 GEL TOPICAL at 21:32

## 2024-05-08 RX ADMIN — ATORVASTATIN CALCIUM 10 MG: 10 TABLET, FILM COATED ORAL at 21:31

## 2024-05-08 RX ADMIN — Medication 500 MG: at 21:31

## 2024-05-08 RX ADMIN — APIXABAN 5 MG: 5 TABLET, FILM COATED ORAL at 08:36

## 2024-05-08 RX ADMIN — LISINOPRIL 2.5 MG: 2.5 TABLET ORAL at 08:36

## 2024-05-08 RX ADMIN — DICLOFENAC SODIUM 4 G: 10 GEL TOPICAL at 12:48

## 2024-05-08 RX ADMIN — FERROUS SULFATE TAB 325 MG (65 MG ELEMENTAL FE) 325 MG: 325 (65 FE) TAB at 08:36

## 2024-05-08 RX ADMIN — INSULIN DETEMIR 40 UNITS: 100 INJECTION, SOLUTION SUBCUTANEOUS at 08:36

## 2024-05-08 RX ADMIN — BACLOFEN 10 MG: 10 TABLET ORAL at 22:19

## 2024-05-08 RX ADMIN — APIXABAN 5 MG: 5 TABLET, FILM COATED ORAL at 21:31

## 2024-05-08 RX ADMIN — OXYCODONE HYDROCHLORIDE AND ACETAMINOPHEN 1 TABLET: 10; 325 TABLET ORAL at 22:19

## 2024-05-08 RX ADMIN — BACLOFEN 10 MG: 10 TABLET ORAL at 14:07

## 2024-05-08 RX ADMIN — DICLOFENAC SODIUM 4 G: 10 GEL TOPICAL at 17:50

## 2024-05-08 RX ADMIN — PREGABALIN 100 MG: 100 CAPSULE ORAL at 21:31

## 2024-05-08 RX ADMIN — CETIRIZINE HYDROCHLORIDE 10 MG: 10 TABLET, FILM COATED ORAL at 16:40

## 2024-05-08 NOTE — THERAPY TREATMENT NOTE
SNF - Occupational Therapy Treatment Note  KE[facility] Dominguez    Patient Name: Jose Shaikh  : 1960    MRN: [id number]                              Today's Date: 2024       Admit Date: 2023    Visit Dx:     ICD-10-CM ICD-9-CM   1. Difficulty in walking  R26.2 719.7   2. Type 2 diabetes mellitus with other specified complication, unspecified whether long term insulin use  E11.69 250.80     Patient Active Problem List   Diagnosis    Diabetic ulcer of left heel associated with type 2 DM    Acute osteomyelitis of left calcaneus     Diabetic ulcer of left heel associated with type 2 DM    Diabetic ulcer of right midfoot associated with type 2 DM    Paroxysmal atrial fibrillation    Essential hypertension    Hyperlipidemia LDL goal <100    Cellulitis and abscess of foot    High alkaline phosphatase level    Osteomyelitis    Onychomycosis    Onychocryptosis    Foot pain, bilateral    Osteomyelitis of foot, right, acute    Cellulitis of right foot    Type 2 diabetes mellitus, with long-term current use of insulin    Class 3 severe obesity due to excess calories with serious comorbidity and body mass index (BMI) of 45.0 to 49.9 in adult    Anxiety disorder, unspecified    Claustrophobia    Dependence on wheelchair    Depression, unspecified    Long term (current) use of anticoagulants    Long term (current) use of oral hypoglycemic drugs    Wound of foot    Ulcer of right foot    Orthostatic hypotension    Other chronic osteomyelitis, right ankle and foot    Personal history of nicotine dependence    Thrombocytopenia, unspecified    Unspecified open wound, right foot, initial encounter    Diabetic foot infection    Subacute osteomyelitis of right foot    Right foot pain    Sepsis    Onychomycosis    Foot pain, left    Impaired mobility and ADLs    Absence of toe of right foot    Corns and callosity    Disability of walking    Fracture    Limb swelling    Polyneuropathy    Pressure ulcer, stage 1    Shortness of  breath    Generalized weakness    Debility    Ulcerated, foot, left, limited to breakdown of skin    Onychomycosis     Past Medical History:   Diagnosis Date    Absence of toe of right foot     Acute osteomyelitis of left calcaneus  08/18/2021    Anxiety and depression     Arthritis     Cancer     Chronic pain     STATES HIS PAIN IS 10/10 AAT    Claustrophobia     Corns and callus     Diabetic ulcer of left heel associated with type 2 DM 08/18/2021    Diabetic ulcer of left heel associated with type 2 DM 07/06/2021    Diabetic ulcer of right midfoot associated with type 2 DM 08/18/2021    Difficulty walking     Essential hypertension 08/31/2021    Hammertoe     Hyperlipidemia LDL goal <100 08/31/2021    Ingrown toenail     Obesity     Paroxysmal atrial fibrillation 08/31/2021    Polyneuropathy     Pressure ulcer, stage 1     Tinea unguium     Type 2 diabetes mellitus with polyneuropathy      Past Surgical History:   Procedure Laterality Date    CYST REMOVAL      center of back; benign    EYE SURGERY      INCISION AND DRAINAGE ABSCESS      back    INCISION AND DRAINAGE LEG Right 12/10/2021    Procedure: INCISION AND DRAINAGE LOWER EXTREMITY;  Surgeon: Ash Leyva DPM;  Location: [facility] OR;  Service: Podiatry;  Laterality: Right;    OTHER SURGICAL HISTORY      Surgical clips left foot    TOE SURGERY Right     Removal of 5th toe    TRANS METATARSAL AMPUTATION Right 12/02/2021    Procedure: AMPUTATION TRANS METATARSAL;  Surgeon: Ash Leyva DPM;  Location: [facility] OR;  Service: Podiatry;  Laterality: Right;    VASCULAR SURGERY      WRIST SURGERY Left     repair of injury      General Information       Row Name 05/08/24 1334          OT Time and Intention    Document Type therapy note (daily note)  -LF     Mode of Treatment individual therapy;occupational therapy  -LF               User Key  (r) = Recorded By, (t) = Taken By, (c) = Cosigned By      Initials Name Provider Type    LF  Michelle Howard OT Occupational Therapist                     Mobility/ADL's       Row Name 05/08/24 1335          Bed Mobility    Bed Mobility supine-sit  -LF     Supine-Sit [place] (Bed Mobility) minimum assist (75% patient effort);verbal cues;2 person assist  -LF     Bed Mobility, Safety Issues decreased use of legs for bridging/pushing  -     Assistive Device (Bed Mobility) overhead trapeze;bed rails;draw sheet;head of bed elevated  -       Row Name 05/08/24 1335          Transfers    Transfers sit-stand transfer;stand-sit transfer;toilet transfer  -       Row Name 05/08/24 1335          Sit-Stand Transfer    Sit-Stand [place] (Transfers) 2 person assist;verbal cues;moderate assist (50% patient effort)  -     Assistive Device (Sit-Stand Transfers) bariatric;walker, front-wheeled  -       Row Name 05/08/24 1335          Stand-Sit Transfer    Stand-Sit [place] (Transfers) 2 person assist;verbal cues;moderate assist (50% patient effort)  -     Assistive Device (Stand-Sit Transfers) bariatric;walker, front-wheeled  -       Row Name 05/08/24 1335          Toilet Transfer    Type (Toilet Transfer) stand-sit;sit-stand;stand pivot/stand step  -     [place] Level (Toilet Transfer) moderate assist (50% patient effort);2 person assist;verbal cues  -     Assistive Device (Toilet Transfer) bariatric equipment;walker, front-wheeled;commode, 3-in-1  -               User Key  (r) = Recorded By, (t) = Taken By, (c) = Cosigned By      Initials Name Provider Type     Michelle Howard OT Occupational Therapist                   Obj/Interventions       Row Name 05/08/24 1336          Motor Skills    Functional Endurance Fair+  -       Row Name 05/08/24 1336          Balance    Balance Assessment sitting static balance;standing dynamic balance  -     Static Sitting Balance supervision  -LF     Position, Sitting Balance unsupported;sitting edge of bed  -     Dynamic Standing Balance  moderate assist;2-person assist;verbal cues  -     Position/Device Used, Standing Balance walker, front-wheeled  bariatric  -LF     Balance Interventions sitting;standing;sit to stand;supported;dynamic;occupation based/functional task;weight shifting activity  -LF               User Key  (r) = Recorded By, (t) = Taken By, (c) = Cosigned By      Initials Name Provider Type    LF Michelle Howard, OT Occupational Therapist                   Goals/Plan    No documentation.                  Clinical Impression       Row Name 05/08/24 1337          Pain Assessment    Additional Documentation Pain Scale: FACES Pre/Post-Treatment (Group)  -LF       Row Name 05/08/24 1337          Pain Scale: FACES Pre/Post-Treatment    Pain: FACES Scale, Pretreatment 2-->hurts little bit  -LF     Posttreatment Pain Rating 2-->hurts little bit  -LF     Pain Location generalized  -LF       Row Name 05/08/24 1336          Plan of Care Review    Plan of Care Reviewed With patient  -LF     Progress improving  -     Outcome Evaluation Patient agreeable to functional transfer/mobility training with max encouragement this session. He transfered from EOB to BSC and then BSC to recliner with mod assist x2 using bariatric RW, but continues to be unable to adavance RLE d/t pain, requiring standing pivot transfer. He would benefit from continued OT services until safe d/c.  -       Row Name 05/08/24 1337          Vital Signs    O2 Delivery Pre Treatment room air  -LF     O2 Delivery Intra Treatment room air  -LF     O2 Delivery Post Treatment room air  -       Row Name 05/08/24 1337          Positioning and Restraints    Pre-Treatment Position in bed  -LF     Post Treatment Position chair  -LF     In Chair reclined;call light within reach;encouraged to call for assist  -LF               User Key  (r) = Recorded By, (t) = Taken By, (c) = Cosigned By      Initials Name Provider Type    LF Michelle Howard, OT Occupational Therapist                    Outcome Measures       Row Name 05/08/24 1200 05/08/24 0955       How much help from another person do you currently need...    Turning from your back to your side while in flat bed without using bedrails? 3  -VK 3  -FM    Moving from lying on back to sitting on the side of a flat bed without bedrails? 3  -VK 3  -FM    Moving to and from a bed to a chair (including a wheelchair)? 2  -VK 2  -FM    Standing up from a chair using your arms (e.g., wheelchair, bedside chair)? 2  -VK 2  -FM    Climbing 3-5 steps with a railing? 1  -VK 1  -FM    To walk in hospital room? 1  -VK 2  -FM    AM-PAC 6 Clicks Score (PT) 12  -VK 13  -FM    Highest Level of Mobility Goal 4 --> Transfer to chair/commode  -VK 4 --> Transfer to chair/commode  -FM              User Key  (r) = Recorded By, (t) = Taken By, (c) = Cosigned By      Initials Name Provider Type    FM Patsy Cano, RN Registered Nurse    Anya Palumbo PTA Physical Therapist Assistant                  Section G              Section GG                         Occupational Therapy Education       Title: PT OT SLP Therapies (Done)       Topic: Occupational Therapy (Done)       Point: ADL training (Done)       Description:   Instruct learner(s) on proper safety adaptation and remediation techniques during self care or transfers.   Instruct in proper use of assistive devices.                  Learning Progress Summary             Patient Acceptance, E,D, DU by PG at 4/24/2024 1039    Acceptance, E,TB, VU by RM at 2/14/2024 0518    Acceptance, E,TB, VU by  at 1/9/2024 0420    Acceptance, E, VU by CR at 12/30/2023 1750    Acceptance, E,TB, VU by RM at 11/30/2023 0420    Acceptance, E,D, DU by PG at 11/7/2023 0932                         Point: Home exercise program (Done)       Description:   Instruct learner(s) on appropriate technique for monitoring, assisting and/or progressing therapeutic exercises/activities.                  Learning Progress Summary              Patient Acceptance, E,D, DU by PG at 4/24/2024 1039    Acceptance, E,TB, VU by RM at 2/14/2024 0518    Acceptance, E,TB, VU by RM at 1/9/2024 0420    Acceptance, E, VU by CR at 12/30/2023 1750    Acceptance, E,TB, VU by RM at 11/30/2023 0420    Acceptance, E,D, DU by PG at 11/7/2023 0932                         Point: Precautions (Done)       Description:   Instruct learner(s) on prescribed precautions during self-care and functional transfers.                  Learning Progress Summary             Patient Acceptance, E,D, DU by PG at 4/24/2024 1039    Acceptance, E,TB, VU by RM at 2/14/2024 0518    Acceptance, E,TB, VU by RM at 1/9/2024 0420    Acceptance, E, VU by CR at 12/30/2023 1750    Acceptance, E,TB, VU by RM at 11/30/2023 0420    Acceptance, E,D, DU by PG at 11/7/2023 0932                         Point: Body mechanics (Done)       Description:   Instruct learner(s) on proper positioning and spine alignment during self-care, functional mobility activities and/or exercises.                  Learning Progress Summary             Patient Acceptance, E,D, DU by PG at 4/24/2024 1039    Acceptance, E,TB, VU by RM at 2/14/2024 0518    Acceptance, E,TB, VU by RM at 1/9/2024 0420    Acceptance, E, VU by CR at 12/30/2023 1750    Acceptance, E,TB, VU by RM at 11/30/2023 0420    Acceptance, E,D, DU by PG at 11/7/2023 0932                                         User Key       Initials Effective Dates Name Provider Type Discipline     06/08/21 -  Bharath Berg, RN Registered Nurse Nurse    CR 06/13/22 -  Adilson Baker LPN Licensed Nurse Nurse    PG 06/16/21 -  Kodak Matos OT Occupational Therapist OT                  OT Recommendation and Plan     Plan of Care Review  Plan of Care Reviewed With: patient  Progress: improving  Outcome Evaluation: Patient agreeable to functional transfer/mobility training with max encouragement this session. He transfered from EOB to BSC and then BSC to recliner with mod assist x2  using bariatric RW, but continues to be unable to adavance RLE d/t pain, requiring standing pivot transfer. He would benefit from continued OT services until safe d/c.     Time Calculation:         Time Calculation- OT       Row Name 05/08/24 1339 05/08/24 1245          Time Calculation- OT    OT Received On 05/08/24  -LF --     OT Goal Re-Cert Due Date 05/12/24  -LF --        Timed Charges    87777 - Gait Training Minutes  -- 12  -VK     01691 - OT Therapeutic Activity Minutes 30  -LF --        Total Minutes    Timed Charges Total Minutes 30  -LF 12  -VK      Total Minutes 30  -LF 12  -VK               User Key  (r) = Recorded By, (t) = Taken By, (c) = Cosigned By      Initials Name Provider Type    LF Michelle Howard OT Occupational Therapist    Anya Palumbo PTA Physical Therapist Assistant                  Therapy Charges for Today       Code Description Service Date Service Provider Modifiers Qty    [id number] HC OT THERAPEUTIC ACT EA 15 MIN 5/8/2024 Michelle Howard OT GO 2                 Michelle Howard OT  5/8/2024

## 2024-05-08 NOTE — THERAPY TREATMENT NOTE
SNF - Physical Therapy Treatment Note  KEO Dominguez     Patient Name: Jose Shaikh  : 1960  MRN: [id number]  Today's Date: 2024      Visit Dx:    ICD-10-CM ICD-9-CM   1. Difficulty in walking  R26.2 719.7   2. Type 2 diabetes mellitus with other specified complication, unspecified whether long term insulin use  E11.69 250.80     Patient Active Problem List   Diagnosis    Diabetic ulcer of left heel associated with type 2 DM    Acute osteomyelitis of left calcaneus     Diabetic ulcer of left heel associated with type 2 DM    Diabetic ulcer of right midfoot associated with type 2 DM    Paroxysmal atrial fibrillation    Essential hypertension    Hyperlipidemia LDL goal <100    Cellulitis and abscess of foot    High alkaline phosphatase level    Osteomyelitis    Onychomycosis    Onychocryptosis    Foot pain, bilateral    Osteomyelitis of foot, right, acute    Cellulitis of right foot    Type 2 diabetes mellitus, with long-term current use of insulin    Class 3 severe obesity due to excess calories with serious comorbidity and body mass index (BMI) of 45.0 to 49.9 in adult    Anxiety disorder, unspecified    Claustrophobia    Dependence on wheelchair    Depression, unspecified    Long term (current) use of anticoagulants    Long term (current) use of oral hypoglycemic drugs    Wound of foot    Ulcer of right foot    Orthostatic hypotension    Other chronic osteomyelitis, right ankle and foot    Personal history of nicotine dependence    Thrombocytopenia, unspecified    Unspecified open wound, right foot, initial encounter    Diabetic foot infection    Subacute osteomyelitis of right foot    Right foot pain    Sepsis    Onychomycosis    Foot pain, left    Impaired mobility and ADLs    Absence of toe of right foot    Corns and callosity    Disability of walking    Fracture    Limb swelling    Polyneuropathy    Pressure ulcer, stage 1    Shortness of breath    Generalized weakness    Debility    Ulcerated, foot,  left, limited to breakdown of skin    Onychomycosis     Past Medical History:   Diagnosis Date    Absence of toe of right foot     Acute osteomyelitis of left calcaneus  08/18/2021    Anxiety and depression     Arthritis     Cancer     Chronic pain     STATES HIS PAIN IS 10/10 AAT    Claustrophobia     Corns and callus     Diabetic ulcer of left heel associated with type 2 DM 08/18/2021    Diabetic ulcer of left heel associated with type 2 DM 07/06/2021    Diabetic ulcer of right midfoot associated with type 2 DM 08/18/2021    Difficulty walking     Essential hypertension 08/31/2021    Hammertoe     Hyperlipidemia LDL goal <100 08/31/2021    Ingrown toenail     Obesity     Paroxysmal atrial fibrillation 08/31/2021    Polyneuropathy     Pressure ulcer, stage 1     Tinea unguium     Type 2 diabetes mellitus with polyneuropathy      Past Surgical History:   Procedure Laterality Date    CYST REMOVAL      center of back; benign    EYE SURGERY      INCISION AND DRAINAGE ABSCESS      back    INCISION AND DRAINAGE LEG Right 12/10/2021    Procedure: INCISION AND DRAINAGE LOWER EXTREMITY;  Surgeon: Ash Leyva DPM;  Location: [facility];  Service: Podiatry;  Laterality: Right;    OTHER SURGICAL HISTORY      Surgical clips left foot    TOE SURGERY Right     Removal of 5th toe    TRANS METATARSAL AMPUTATION Right 12/02/2021    Procedure: AMPUTATION TRANS METATARSAL;  Surgeon: Ash Leyva DPM;  Location: [facility];  Service: Podiatry;  Laterality: Right;    VASCULAR SURGERY      WRIST SURGERY Left     repair of injury       PT Assessment (Last 12 Hours)       PT Evaluation and Treatment       Row Name 05/08/24 1004          Physical Therapy Time and Intention    Subjective Information complains of;pain  -VK     Document Type therapy note (daily note)  -VK     Mode of Treatment individual therapy;physical therapy  -VK     Patient Effort good  -VK     Symptoms Noted During/After Treatment increased  pain  -VK       [facility] Name 05/08/24 1004          General Information    Patient Profile Reviewed yes  -VK     Existing Precautions/Restrictions fall  -VK       Row Name 05/08/24 1004          Pain    Pain Location - Side/Orientation Bilateral  -VK     Pain Location - hip;knee;foot  -VK     Pain Intervention(s) Nursing Notified;Repositioned;Rest;Ambulation/increased activity  -VK       Row Name 05/08/24 1004          Cognition    Affect/Mental Status (Cognition) agitated;WFL  -VK     Behavioral Issues (Cognition) difficulty managing stress;inappropriate language;overwhelmed easily  Pt cursing at staff during treatment and threatened to bite staff.  -VK     Orientation Status (Cognition) oriented to;person;place;situation  -VK     Personal Safety Interventions nonskid shoes/slippers when out of bed;gait belt;fall prevention program maintained  -VK       [facility] Name 05/08/24 1004          Mobility    Extremity Weight-bearing Status left lower extremity;right lower extremity  -VK     Left Lower Extremity (Weight-bearing Status) weight-bearing as tolerated (WBAT)  -VK     Right Lower Extremity (Weight-bearing Status) weight-bearing as tolerated (WBAT)  -VK       [facility] Name 05/08/24 1004          Bed Mobility    Supine-Sit [place] (Bed Mobility) minimum assist (75% patient effort);verbal cues;2 person assist  -VK     Bed Mobility, Safety Issues decreased use of legs for bridging/pushing  -VK     Assistive Device (Bed Mobility) overhead trapeze;bed rails;draw sheet;head of bed elevated  -[facility] Name 05/08/24 1004          Transfers    Transfers sit-stand transfer;stand-sit transfer;stand pivot/stand step transfer;toilet transfer  -[facility] Name 05/08/24 1004          Sit-Stand Transfer    Sit-Stand [place] (Transfers) minimum assist (75% patient effort);2 person assist;verbal cues  -VK     Assistive Device (Sit-Stand Transfers) bariatric;walker, front-wheeled  -       Row Name 05/08/24 1004           Stand-Sit Transfer    Stand-Sit [place] (Transfers) minimum assist (75% patient effort);2 person assist;verbal cues  -VK     Assistive Device (Stand-Sit Transfers) bariatric;walker, front-wheeled  -VK       Row Name 05/08/24 1004          Stand Pivot/Stand Step Transfer    Stand Pivot/Stand Step [place] (Transfers) moderate assist (50% patient effort);verbal cues;2 person assist  -VK     Assistive Device (Stand Pivot Stand Step Transfer) walker, front-wheeled  -VK       Row Name 05/08/24 1004          Toilet Transfer    Type (Toilet Transfer) stand-sit;sit-stand;stand pivot/stand step  -VK     [place] Level (Toilet Transfer) moderate assist (50% patient effort);2 person assist;verbal cues  -VK     Assistive Device (Toilet Transfer) bariatric equipment;walker, front-wheeled;commode, bedside without drop arms  -VK       Row Name 05/08/24 1004          Gait/Stairs (Locomotion)    [place] Level (Gait) moderate assist (50% patient effort);2 person assist;verbal cues  -VK     Assistive Device (Gait) bariatric equipment;walker, front-wheeled  -VK     Patient was able to Ambulate yes  -VK     Distance in Feet (Gait) --  2ft  -VK     Pattern (Gait) 4-point;step-to  -VK     Deviations/Abnormal Patterns (Gait) stride length decreased;antalgic;base of support, wide;lillie decreased;gait speed decreased  -VK     Bilateral Gait Deviations weight shift ability decreased;heel strike decreased;forward flexed posture  -VK       Row Name 05/08/24 1004          Safety Issues, Functional Mobility    Impairments Affecting Function (Mobility) balance;endurance/activity tolerance;strength  -VK       Row Name 05/08/24 1004          Balance    Static Sitting Balance supervision  -VK     Position, Sitting Balance sitting edge of bed  -VK     Static Standing Balance minimal assist;2-person assist;verbal cues  -VK     Dynamic Standing Balance moderate assist;2-person assist;verbal cues  -VK     Position/Device Used,  Standing Balance walker, front-wheeled  -VK       Row Name             Wound 03/27/24 1045 Right anterior Skin Tear    Wound - Properties Group Placement Date: 03/27/24  -CR Placement Time: 1045  -CR Side: Right  -CR Orientation: anterior  -CR Primary Wound Type: Skin tear  -CR    Retired Wound - Properties Group Placement Date: 03/27/24  -CR Placement Time: 1045  -CR Side: Right  -CR Orientation: anterior  -CR Primary Wound Type: Skin tear  -CR    Retired Wound - Properties Group Date first assessed: 03/27/24  -CR Time first assessed: 1045  -CR Side: Right  -CR Primary Wound Type: Skin tear  -CR      Row Name             Wound 03/28/24 0309 Other (See comments) medial coccyx MASD (Moisture associated skin damage)    Wound - Properties Group Placement Date: 03/28/24  -DS Placement Time: 0309  -DS Present on Original Admission: N  -DS Side: Other (See comments)  -DS, medial  Orientation: medial  -DS Location: coccyx  -DS Primary Wound Type: MASD  -DS    Retired Wound - Properties Group Placement Date: 03/28/24  -DS Placement Time: 0309  -DS Present on Original Admission: N  -DS Side: Other (See comments)  -DS, medial  Orientation: medial  -DS Location: coccyx  -DS Primary Wound Type: MASD  -DS    Retired Wound - Properties Group Date first assessed: 03/28/24  -DS Time first assessed: 0309  -DS Present on Original Admission: N  -DS Side: Other (See comments)  -DS, medial  Location: coccyx  -DS Primary Wound Type: MASD  -DS      Row Name             Wound 04/03/24 1730 Right anterior hip MASD (Moisture associated skin damage)    Wound - Properties Group Placement Date: 04/03/24  -CA Placement Time: 1730  -CA Side: Right  -CA Orientation: anterior  -CA Location: hip  -CA Primary Wound Type: MASD  -CA    Retired Wound - Properties Group Placement Date: 04/03/24  -CA Placement Time: 1730  -CA Side: Right  -CA Orientation: anterior  -CA Location: hip  -CA Primary Wound Type: MASD  -CA    Retired Wound - Properties Group  Date first assessed: 04/03/24  -IL Time first assessed: 1730  -IL Side: Right  -IL Location: hip  -IL Primary Wound Type: MASD  -IL      Row Name             Wound 05/02/24 1033 Left posterior plantar    Wound - Properties Group Placement Date: 05/02/24  -KR Placement Time: 1033  -KR Side: Left  -KR Orientation: posterior  -KR Location: plantar  -KR    Retired Wound - Properties Group Placement Date: 05/02/24  -KR Placement Time: 1033  -KR Side: Left  -KR Orientation: posterior  -KR Location: plantar  -KR    Retired Wound - Properties Group Date first assessed: 05/02/24  -KR Time first assessed: 1033  -KR Side: Left  -KR Location: plantar  -KR      Row Name 05/08/24 1004          Positioning and Restraints    Pre-Treatment Position in bed  -VK     Post Treatment Position chair  -VK     In Chair reclined;call light within reach;encouraged to call for assist  Pt declines use of alarms and to have BLE elevated.  -VK       Row Name 05/08/24 1004          Progress Summary (PT)    Progress Toward Functional Goals (PT) progress toward functional goals is gradual  -VK               User Key  (r) = Recorded By, (t) = Taken By, (c) = Cosigned By      Initials Name Provider Type    Linda Hickman, RN Registered Nurse    Nicole Elise, RN Registered Nurse    Adilson Carmona LPN Licensed Nurse    Anya Palumbo PTA Physical Therapist Assistant    Almaz Morales RN Registered Nurse                  Section G              Section GG                       Physical Therapy Education       Title: PT OT SLP Therapies (Done)       Topic: Physical Therapy (Done)       Point: Mobility training (Done)       Learning Progress Summary             Patient Acceptance, E,D, DU by PG at 4/24/2024 1039    Acceptance, E,TB, VU by RM at 2/14/2024 0518    Acceptance, E,TB, VU by RM at 1/9/2024 0420    Acceptance, E, VU by CR at 12/30/2023 1750    Acceptance, E, VU by CR at 12/20/2023 1345    Acceptance, E, VU by CR at  12/19/2023 1004    Acceptance, E,TB, VU by RM at 11/30/2023 0420    Acceptance, E,TB, VU by MOE at 11/8/2023 1341                         Point: Precautions (Done)       Learning Progress Summary             Patient Acceptance, E,D, DU by PG at 4/24/2024 1039    Acceptance, E,TB, VU by RM at 2/14/2024 0518    Acceptance, E,TB, VU by RM at 1/9/2024 0420    Acceptance, E, VU by CR at 12/30/2023 1750    Acceptance, E,TB, VU by RM at 11/30/2023 0420    Acceptance, E,TB, VU by MOE at 11/8/2023 1341                                         User Key       Initials Effective Dates Name Provider Type Discipline     06/08/21 -  Bharath Berg, RN Registered Nurse Nurse    CR 06/13/22 -  Adilson Baker LPN Licensed Nurse Nurse    PG 06/16/21 -  Kodak Matos OT Occupational Therapist OT    MOE 06/03/21 -  Merlin Rao, PT Physical Therapist PT                  PT Recommendation and Plan     Progress Summary (PT)  Progress Toward Functional Goals (PT): progress toward functional goals is gradual   Outcome Measures       Row Name 05/08/24 1200 05/06/24 1124          How much help from another person do you currently need...    Turning from your back to your side while in flat bed without using bedrails? 3  -VK 3  -WM     Moving from lying on back to sitting on the side of a flat bed without bedrails? 3  -VK 3  -WM     Moving to and from a bed to a chair (including a wheelchair)? 2  -VK 2  -WM     Standing up from a chair using your arms (e.g., wheelchair, bedside chair)? 2  -VK 2  -WM     Climbing 3-5 steps with a railing? 1  -VK 1  -WM     To walk in hospital room? 1  -VK 2  -WM     AM-PAC 6 Clicks Score (PT) 12  -VK 13  -WM     Highest Level of Mobility Goal 4 --> Transfer to chair/commode  -VK 4 --> Transfer to chair/commode  -WM               User Key  (r) = Recorded By, (t) = Taken By, (c) = Cosigned By      Initials Name Provider Type    WM Storm, Carson, PTA Physical Therapist Assistant    Anya Palumbo, CURTIS  Physical Therapist Assistant                     Time Calculation:    PT Charges       Row Name 05/08/24 1245             Time Calculation    PT Received On 05/08/24  -VK         Timed Charges    03424 - Gait Training Minutes  12  -VK      44272 - PT Therapeutic Activity Minutes 15  -VK         SNF Physical Therapy Minutes    Skilled Minutes- PT 27 min  -VK         Total Minutes    Timed Charges Total Minutes 27  -VK       Total Minutes 27  -VK                User Key  (r) = Recorded By, (t) = Taken By, (c) = Cosigned By      Initials Name Provider Type    Anya Palumbo PTA Physical Therapist Assistant                  Therapy Charges for Today       Code Description Service Date Service Provider Modifiers Qty    37599700222 HC GAIT TRAINING EA 15 MIN 5/8/2024 Anya Sam PTA GP 1    33386779838 HC PT THERAPEUTIC ACT EA 15 MIN 5/8/2024 Anya Sam PTA GP 1            PT G-Codes  Outcome Measure Options: AM-PAC 6 Clicks Basic Mobility (PT)  AM-PAC 6 Clicks Score (PT): 12  AM-PAC 6 Clicks Score (OT): 16    Anya Sam PTA  5/8/2024

## 2024-05-08 NOTE — PLAN OF CARE
Goal Outcome Evaluation:           Progress: improving  Outcome Evaluation: Rsd. is alert and oriented x4, able to make needs known to staff.  Rsd. transferred x2 assist to BS today, and was able to get in recliner.  R) foot splint/ace wrap utilized with transfers.  Rsd. sat in recliner for approx 1 1/2 hours and then returned to bed prior to lunch.  Wound care completed this shift, pictures obtained, weekly weight also gotten.  See documentation.  Call light and personal items within reach, Rsd. reminded to use call light for assistance, verbalizes understanding. Will continue to monitor and notify on-coming shift.  New order recieved for zyrtec, started this shift.  Rsd. tolerated well.  Current plan of care continues at this time.

## 2024-05-08 NOTE — PLAN OF CARE
Goal Outcome Evaluation:  Plan of Care Reviewed With: patient        Progress: no change  Outcome Evaluation: Alert and oriented x4; able to call for assist as needed. Pain meds administered as needed; see MAR. Anusol administered per request with relief. Resident to get up to BSC this morning and have right foot brace on prior to working with PT/OT. Continues to use bedpan with incontinent episodes of stool. Independent with urinal. Call light within reach; care plan ongoing.

## 2024-05-09 LAB
GLUCOSE BLDC GLUCOMTR-MCNC: 108 MG/DL (ref 70–99)
GLUCOSE BLDC GLUCOMTR-MCNC: 109 MG/DL (ref 70–99)
GLUCOSE BLDC GLUCOMTR-MCNC: 125 MG/DL (ref 70–99)
GLUCOSE BLDC GLUCOMTR-MCNC: 161 MG/DL (ref 70–99)

## 2024-05-09 PROCEDURE — 97110 THERAPEUTIC EXERCISES: CPT

## 2024-05-09 PROCEDURE — 63710000001 INSULIN DETEMIR PER 5 UNITS: Performed by: PHYSICIAN ASSISTANT

## 2024-05-09 PROCEDURE — 82948 REAGENT STRIP/BLOOD GLUCOSE: CPT

## 2024-05-09 PROCEDURE — 63710000001 ONDANSETRON ODT 4 MG TABLET DISPERSIBLE: Performed by: INTERNAL MEDICINE

## 2024-05-09 PROCEDURE — 63710000001 INSULIN LISPRO (HUMAN) PER 5 UNITS: Performed by: INTERNAL MEDICINE

## 2024-05-09 PROCEDURE — 82948 REAGENT STRIP/BLOOD GLUCOSE: CPT | Performed by: INTERNAL MEDICINE

## 2024-05-09 RX ADMIN — Medication 10 MG: at 20:45

## 2024-05-09 RX ADMIN — PREGABALIN 100 MG: 100 CAPSULE ORAL at 20:45

## 2024-05-09 RX ADMIN — PREGABALIN 100 MG: 100 CAPSULE ORAL at 18:06

## 2024-05-09 RX ADMIN — PREGABALIN 100 MG: 100 CAPSULE ORAL at 08:08

## 2024-05-09 RX ADMIN — CETIRIZINE HYDROCHLORIDE 10 MG: 10 TABLET, FILM COATED ORAL at 08:09

## 2024-05-09 RX ADMIN — ONDANSETRON 4 MG: 4 TABLET, ORALLY DISINTEGRATING ORAL at 08:18

## 2024-05-09 RX ADMIN — Medication 500 MG: at 08:08

## 2024-05-09 RX ADMIN — APIXABAN 5 MG: 5 TABLET, FILM COATED ORAL at 20:45

## 2024-05-09 RX ADMIN — DICLOFENAC SODIUM 4 G: 10 GEL TOPICAL at 20:47

## 2024-05-09 RX ADMIN — Medication 500 MG: at 20:45

## 2024-05-09 RX ADMIN — BACLOFEN 10 MG: 10 TABLET ORAL at 20:45

## 2024-05-09 RX ADMIN — INSULIN LISPRO 4 UNITS: 100 INJECTION, SOLUTION INTRAVENOUS; SUBCUTANEOUS at 20:55

## 2024-05-09 RX ADMIN — INSULIN DETEMIR 40 UNITS: 100 INJECTION, SOLUTION SUBCUTANEOUS at 20:45

## 2024-05-09 RX ADMIN — LISINOPRIL 2.5 MG: 2.5 TABLET ORAL at 08:08

## 2024-05-09 RX ADMIN — IBUPROFEN 400 MG: 400 TABLET ORAL at 05:58

## 2024-05-09 RX ADMIN — INSULIN DETEMIR 40 UNITS: 100 INJECTION, SOLUTION SUBCUTANEOUS at 08:06

## 2024-05-09 RX ADMIN — FERROUS SULFATE TAB 325 MG (65 MG ELEMENTAL FE) 325 MG: 325 (65 FE) TAB at 08:08

## 2024-05-09 RX ADMIN — ATORVASTATIN CALCIUM 10 MG: 10 TABLET, FILM COATED ORAL at 20:45

## 2024-05-09 RX ADMIN — DICLOFENAC SODIUM 4 G: 10 GEL TOPICAL at 18:06

## 2024-05-09 RX ADMIN — HYDROCORTISONE 2.5% 1 APPLICATION: 25 CREAM TOPICAL at 14:57

## 2024-05-09 RX ADMIN — OXYCODONE HYDROCHLORIDE AND ACETAMINOPHEN 1 TABLET: 10; 325 TABLET ORAL at 08:09

## 2024-05-09 RX ADMIN — FUROSEMIDE 40 MG: 40 TABLET ORAL at 08:08

## 2024-05-09 RX ADMIN — APIXABAN 5 MG: 5 TABLET, FILM COATED ORAL at 08:08

## 2024-05-09 RX ADMIN — DICLOFENAC SODIUM 4 G: 10 GEL TOPICAL at 08:06

## 2024-05-09 RX ADMIN — EMPAGLIFLOZIN 10 MG: 10 TABLET, FILM COATED ORAL at 08:08

## 2024-05-09 RX ADMIN — SERTRALINE HYDROCHLORIDE 50 MG: 50 TABLET ORAL at 20:46

## 2024-05-09 RX ADMIN — OXYCODONE HYDROCHLORIDE AND ACETAMINOPHEN 1 TABLET: 10; 325 TABLET ORAL at 20:46

## 2024-05-09 RX ADMIN — CYANOCOBALAMIN TAB 500 MCG 1000 MCG: 500 TAB at 08:08

## 2024-05-09 RX ADMIN — DICLOFENAC SODIUM 4 G: 10 GEL TOPICAL at 12:45

## 2024-05-09 RX ADMIN — BACLOFEN 10 MG: 10 TABLET ORAL at 08:08

## 2024-05-09 NOTE — THERAPY TREATMENT NOTE
SNF - Occupational Therapy Treatment Note  KEO Dominguez    Patient Name: Jose Shaikh  : 1960    MRN: [id number]                              Today's Date: 2024       Admit Date: 2023    Visit Dx:     ICD-10-CM ICD-9-CM   1. Difficulty in walking  R26.2 719.7   2. Type 2 diabetes mellitus with other specified complication, unspecified whether long term insulin use  E11.69 250.80     Patient Active Problem List   Diagnosis    Diabetic ulcer of left heel associated with type 2 DM    Acute osteomyelitis of left calcaneus     Diabetic ulcer of left heel associated with type 2 DM    Diabetic ulcer of right midfoot associated with type 2 DM    Paroxysmal atrial fibrillation    Essential hypertension    Hyperlipidemia LDL goal <100    Cellulitis and abscess of foot    High alkaline phosphatase level    Osteomyelitis    Onychomycosis    Onychocryptosis    Foot pain, bilateral    Osteomyelitis of foot, right, acute    Cellulitis of right foot    Type 2 diabetes mellitus, with long-term current use of insulin    Class 3 severe obesity due to excess calories with serious comorbidity and body mass index (BMI) of 45.0 to 49.9 in adult    Anxiety disorder, unspecified    Claustrophobia    Dependence on wheelchair    Depression, unspecified    Long term (current) use of anticoagulants    Long term (current) use of oral hypoglycemic drugs    Wound of foot    Ulcer of right foot    Orthostatic hypotension    Other chronic osteomyelitis, right ankle and foot    Personal history of nicotine dependence    Thrombocytopenia, unspecified    Unspecified open wound, right foot, initial encounter    Diabetic foot infection    Subacute osteomyelitis of right foot    Right foot pain    Sepsis    Onychomycosis    Foot pain, left    Impaired mobility and ADLs    Absence of toe of right foot    Corns and callosity    Disability of walking    Fracture    Limb swelling    Polyneuropathy    Pressure ulcer, stage 1    Shortness of  breath    Generalized weakness    Debility    Ulcerated, foot, left, limited to breakdown of skin    Onychomycosis     Past Medical History:   Diagnosis Date    Absence of toe of right foot     Acute osteomyelitis of left calcaneus  08/18/2021    Anxiety and depression     Arthritis     Cancer     Chronic pain     STATES HIS PAIN IS 10/10 AAT    Claustrophobia     Corns and callus     Diabetic ulcer of left heel associated with type 2 DM 08/18/2021    Diabetic ulcer of left heel associated with type 2 DM 07/06/2021    Diabetic ulcer of right midfoot associated with type 2 DM 08/18/2021    Difficulty walking     Essential hypertension 08/31/2021    Hammertoe     Hyperlipidemia LDL goal <100 08/31/2021    Ingrown toenail     Obesity     Paroxysmal atrial fibrillation 08/31/2021    Polyneuropathy     Pressure ulcer, stage 1     Tinea unguium     Type 2 diabetes mellitus with polyneuropathy      Past Surgical History:   Procedure Laterality Date    CYST REMOVAL      center of back; benign    EYE SURGERY      INCISION AND DRAINAGE ABSCESS      back    INCISION AND DRAINAGE LEG Right 12/10/2021    Procedure: INCISION AND DRAINAGE LOWER EXTREMITY;  Surgeon: Ash Leyva DPM;  Location: [facility] OR;  Service: Podiatry;  Laterality: Right;    OTHER SURGICAL HISTORY      Surgical clips left foot    TOE SURGERY Right     Removal of 5th toe    TRANS METATARSAL AMPUTATION Right 12/02/2021    Procedure: AMPUTATION TRANS METATARSAL;  Surgeon: Ash Leyva DPM;  Location: [facility] OR;  Service: Podiatry;  Laterality: Right;    VASCULAR SURGERY      WRIST SURGERY Left     repair of injury      General Information       Row Name 05/09/24 1245          OT Time and Intention    Document Type therapy note (daily note)  -GC     Mode of Treatment individual therapy;occupational therapy  -GC       Row Name 05/09/24 1245          General Information    Patient Profile Reviewed yes  -GC     Existing  Precautions/Restrictions fall  -[facility] Name 05/09/24 1245          Safety Issues, Functional Mobility    Impairments Affecting Function (Mobility) balance;endurance/activity tolerance;strength  -               User Key  (r) = Recorded By, (t) = Taken By, (c) = Cosigned By      Initials Name Provider Type     Rosalva Irvin OT Occupational Therapist                     Mobility/ADL's    No documentation.                  Obj/Interventions       [facility] Name 05/09/24 1245          Shoulder (Therapeutic Exercise)    Shoulder (Therapeutic Exercise) strengthening exercise  -     Shoulder Strengthening (Therapeutic Exercise) bilateral;flexion;extension;horizontal aBduction/aDduction;resisted diagonal exercise;supine;green;resistance band  -[facility] Name 05/09/24 1245          Elbow/Forearm (Therapeutic Exercise)    Elbow/Forearm (Therapeutic Exercise) strengthening exercise;other (see comments)  -     Elbow/Forearm Strengthening (Therapeutic Exercise) bilateral;flexion;extension;supine;5 lb free weight;2 sets;15 repititions;resistance band;green  -[facility] Name 05/09/24 1245          Motor Skills    Therapeutic Exercise elbow/forearm;shoulder  -               User Key  (r) = Recorded By, (t) = Taken By, (c) = Cosigned By      Initials Name Provider Type    Rosalva Campbell OT Occupational Therapist                   Goals/Plan    No documentation.                  Clinical Impression       [facility] Name 05/09/24 1248          Pain Scale: FACES Pre/Post-Treatment    Pain: FACES Scale, Pretreatment 2-->hurts little bit  -     Posttreatment Pain Rating 2-->hurts little bit  -     Pain Location - Side/Orientation Left  -     Pain Location - shoulder  -     Pre/Posttreatment Pain Comment Nursing aware  -[facility] Name 05/09/24 1248          Plan of Care Review    Plan of Care Reviewed With patient  -     Progress no change  -     Outcome Evaluation Pt. declined to sit on EOB or participate in  transfers due to feeling nauseated.  Nursing addressed with Zofran.  Pt. did later engage in TE in semi-stewart position with theraband and FWs.  Reviewed a few different exercises with theraband that patient could do on his own.  -GC       Row Name 05/09/24 1248          Therapy Plan Review/Discharge Plan (OT)    Anticipated Discharge Disposition (OT) [facility]  -               User Key  (r) = Recorded By, (t) = Taken By, (c) = Cosigned By      Initials Name Provider Type    GC Rosalva Irvin OT Occupational Therapist                   Outcome Measures       Row Name 05/09/24 1250          How much help from another is currently needed...    Putting on and taking off regular lower body clothing? 1  -GC     Bathing (including washing, rinsing, and drying) 2  -GC     Toileting (which includes using toilet bed pan or urinal) 1  -GC     Putting on and taking off regular upper body clothing 4  -GC     Taking care of personal grooming (such as brushing teeth) 4  -GC     Eating meals 4  -GC     AM-PAC 6 Clicks Score (OT) 16  -GC       Row Name 05/09/24 1045          How much help from another person do you currently need...    Turning from your back to your side while in flat bed without using bedrails? 3  -WM     Moving from lying on back to sitting on the side of a flat bed without bedrails? 3  -WM     Moving to and from a bed to a chair (including a wheelchair)? 2  -WM     Standing up from a chair using your arms (e.g., wheelchair, bedside chair)? 2  -WM     Climbing 3-5 steps with a railing? 1  -WM     To walk in hospital room? 1  -WM     AM-PAC 6 Clicks Score (PT) 12  -WM     Highest Level of Mobility Goal 4 --> Transfer to chair/commode  -WM       Row Name 05/09/24 1250          Functional Assessment    Outcome Measure Options AM-PAC 6 Clicks Daily Activity (OT);Optimal Instrument  -GC       Row Name 05/09/24 1250          Optimal Instrument    Optimal Instrument Optimal - 3  -GC     Bending/Stooping 4   -GC     Standing 4  -GC     Reaching 2  -GC               User Key  (r) = Recorded By, (t) = Taken By, (c) = Cosigned By      Initials Name Provider Type    Carson Johnson, CURTIS Physical Therapist Assistant    Rosalva Campbell OT Occupational Therapist                  Section G              Section GG                         Occupational Therapy Education       Title: PT OT SLP Therapies (Done)       Topic: Occupational Therapy (Done)       Point: ADL training (Done)       Description:   Instruct learner(s) on proper safety adaptation and remediation techniques during self care or transfers.   Instruct in proper use of assistive devices.                  Learning Progress Summary             Patient Acceptance, E,D, DU by PG at 4/24/2024 1039    Acceptance, E,TB, VU by RM at 2/14/2024 0518    Acceptance, E,TB, VU by RM at 1/9/2024 0420    Acceptance, E, VU by CR at 12/30/2023 1750    Acceptance, E,TB, VU by RM at 11/30/2023 0420    Acceptance, E,D, DU by PG at 11/7/2023 0932                         Point: Home exercise program (Done)       Description:   Instruct learner(s) on appropriate technique for monitoring, assisting and/or progressing therapeutic exercises/activities.                  Learning Progress Summary             Patient Acceptance, E,D, DU by PG at 4/24/2024 1039    Acceptance, E,TB, VU by RM at 2/14/2024 0518    Acceptance, E,TB, VU by RM at 1/9/2024 0420    Acceptance, E, VU by CR at 12/30/2023 1750    Acceptance, E,TB, VU by RM at 11/30/2023 0420    Acceptance, E,D, DU by PG at 11/7/2023 0932                         Point: Precautions (Done)       Description:   Instruct learner(s) on prescribed precautions during self-care and functional transfers.                  Learning Progress Summary             Patient Acceptance, E,D, DU by PG at 4/24/2024 1039    Acceptance, E,TB, VU by RM at 2/14/2024 0518    Acceptance, E,TB, VU by RM at 1/9/2024 0420    Acceptance, E, VU by CR at 12/30/2023  1750    Acceptance, E,TB, VU by RM at 11/30/2023 0420    Acceptance, E,D, DU by PG at 11/7/2023 0932                         Point: Body mechanics (Done)       Description:   Instruct learner(s) on proper positioning and spine alignment during self-care, functional mobility activities and/or exercises.                  Learning Progress Summary             Patient Acceptance, E,D, DU by PG at 4/24/2024 1039    Acceptance, E,TB, VU by  at 2/14/2024 0518    Acceptance, E,TB, VU by  at 1/9/2024 0420    Acceptance, E, VU by CR at 12/30/2023 1750    Acceptance, E,TB, VU by RM at 11/30/2023 0420    Acceptance, E,D, DU by PG at 11/7/2023 0932                                         User Key       Initials Effective Dates Name Provider Type Discipline     06/08/21 -  Bharath Berg, RN Registered Nurse Nurse    CR 06/13/22 -  Adilson Baker LPN Licensed Nurse Nurse    PG 06/16/21 -  Kodak Matos OT Occupational Therapist OT                  OT Recommendation and Plan     Plan of Care Review  Plan of Care Reviewed With: patient  Progress: no change  Outcome Evaluation: Pt. declined to sit on EOB or participate in transfers due to feeling nauseated.  Nursing addressed with Zofran.  Pt. did later engage in TE in semi-stewart position with theraband and FWs.  Reviewed a few different exercises with theraband that patient could do on his own.     Time Calculation:         Time Calculation- OT       Row Name 05/09/24 1250             Time Calculation- OT    OT Received On 05/09/24  -         Timed Charges    01446 - OT Therapeutic Exercise Minutes 27  -GC         SNF Occupational Therapy Minutes    Skilled Minutes- OT 27 min  -GC         Total Minutes    Timed Charges Total Minutes 27  -GC       Total Minutes 27  -GC                User Key  (r) = Recorded By, (t) = Taken By, (c) = Cosigned By      Initials Name Provider Type    Rosalva Campbell OT Occupational Therapist                  Therapy Charges for Today        Code Description Service Date Service Provider Modifiers Qty    [id number] HC OT THER PROC EA 15 MIN 5/9/2024 Rosalva Irvin OT GO 2                 Rosalva Irvin OT  5/9/2024

## 2024-05-09 NOTE — THERAPY TREATMENT NOTE
SNF - Physical Therapy Treatment Note  KEO Dominguez     Patient Name: Jose Shaikh  : 1960  MRN: [id number]  Today's Date: 2024      Visit Dx:    ICD-10-CM ICD-9-CM   1. Difficulty in walking  R26.2 719.7   2. Type 2 diabetes mellitus with other specified complication, unspecified whether long term insulin use  E11.69 250.80     Patient Active Problem List   Diagnosis    Diabetic ulcer of left heel associated with type 2 DM    Acute osteomyelitis of left calcaneus     Diabetic ulcer of left heel associated with type 2 DM    Diabetic ulcer of right midfoot associated with type 2 DM    Paroxysmal atrial fibrillation    Essential hypertension    Hyperlipidemia LDL goal <100    Cellulitis and abscess of foot    High alkaline phosphatase level    Osteomyelitis    Onychomycosis    Onychocryptosis    Foot pain, bilateral    Osteomyelitis of foot, right, acute    Cellulitis of right foot    Type 2 diabetes mellitus, with long-term current use of insulin    Class 3 severe obesity due to excess calories with serious comorbidity and body mass index (BMI) of 45.0 to 49.9 in adult    Anxiety disorder, unspecified    Claustrophobia    Dependence on wheelchair    Depression, unspecified    Long term (current) use of anticoagulants    Long term (current) use of oral hypoglycemic drugs    Wound of foot    Ulcer of right foot    Orthostatic hypotension    Other chronic osteomyelitis, right ankle and foot    Personal history of nicotine dependence    Thrombocytopenia, unspecified    Unspecified open wound, right foot, initial encounter    Diabetic foot infection    Subacute osteomyelitis of right foot    Right foot pain    Sepsis    Onychomycosis    Foot pain, left    Impaired mobility and ADLs    Absence of toe of right foot    Corns and callosity    Disability of walking    Fracture    Limb swelling    Polyneuropathy    Pressure ulcer, stage 1    Shortness of breath    Generalized weakness    Debility    Ulcerated, foot,  left, limited to breakdown of skin    Onychomycosis     Past Medical History:   Diagnosis Date    Absence of toe of right foot     Acute osteomyelitis of left calcaneus  08/18/2021    Anxiety and depression     Arthritis     Cancer     Chronic pain     STATES HIS PAIN IS 10/10 AAT    Claustrophobia     Corns and callus     Diabetic ulcer of left heel associated with type 2 DM 08/18/2021    Diabetic ulcer of left heel associated with type 2 DM 07/06/2021    Diabetic ulcer of right midfoot associated with type 2 DM 08/18/2021    Difficulty walking     Essential hypertension 08/31/2021    Hammertoe     Hyperlipidemia LDL goal <100 08/31/2021    Ingrown toenail     Obesity     Paroxysmal atrial fibrillation 08/31/2021    Polyneuropathy     Pressure ulcer, stage 1     Tinea unguium     Type 2 diabetes mellitus with polyneuropathy      Past Surgical History:   Procedure Laterality Date    CYST REMOVAL      center of back; benign    EYE SURGERY      INCISION AND DRAINAGE ABSCESS      back    INCISION AND DRAINAGE LEG Right 12/10/2021    Procedure: INCISION AND DRAINAGE LOWER EXTREMITY;  Surgeon: Ash Leyva DPM;  Location: [facility];  Service: Podiatry;  Laterality: Right;    OTHER SURGICAL HISTORY      Surgical clips left foot    TOE SURGERY Right     Removal of 5th toe    TRANS METATARSAL AMPUTATION Right 12/02/2021    Procedure: AMPUTATION TRANS METATARSAL;  Surgeon: Ash Leyva DPM;  Location: [facility];  Service: Podiatry;  Laterality: Right;    VASCULAR SURGERY      WRIST SURGERY Left     repair of injury       PT Assessment (Last 12 Hours)       PT Evaluation and Treatment       Row Name 05/09/24 1041          Physical Therapy Time and Intention    Subjective Information complains of;nausea/vomiting  -WM     Document Type therapy note (daily note)  -WM     Mode of Treatment individual therapy;physical therapy  -WM     Patient Effort good  -WM     Symptoms Noted During/After  Treatment fatigue;nausea  -       Row Name 05/09/24 1041          Hip (Therapeutic Exercise)    Hip AAROM (Therapeutic Exercise) bilateral;aBduction;aDduction;external rotation;internal rotation;supine;10 repetitions;5 repetitions;2 sets  -     Hip Isometrics (Therapeutic Exercise) bilateral;gluteal sets;supine;10 repetitions;5 repetitions;3 second hold;2 sets  -     Hip Strengthening (Therapeutic Exercise) bilateral;heel slides;supine;30 repititions  Manual resistance  -       Row Name 05/09/24 1041          Knee (Therapeutic Exercise)    Knee Isometrics (Therapeutic Exercise) bilateral;quad sets;supine;10 repetitions;5 repetitions;3 second hold;2 sets  -     Knee Strengthening (Therapeutic Exercise) bilateral;SAQ (short arc quad);supine;3 lb free weight;30 repititions  -       Row Name 05/09/24 1041          Ankle (Therapeutic Exercise)    Ankle AROM (Therapeutic Exercise) bilateral;dorsiflexion;plantarflexion;supine;30 repititions  -       Row Name             Wound 03/27/24 1045 Right anterior Skin Tear    Wound - Properties Group Placement Date: 03/27/24  -CR Placement Time: 1045  -CR Side: Right  -CR Orientation: anterior  -CR Primary Wound Type: Skin tear  -CR    Retired Wound - Properties Group Placement Date: 03/27/24  -CR Placement Time: 1045  -CR Side: Right  -CR Orientation: anterior  -CR Primary Wound Type: Skin tear  -CR    Retired Wound - Properties Group Date first assessed: 03/27/24  -CR Time first assessed: 1045  -CR Side: Right  -CR Primary Wound Type: Skin tear  -CR      Row Name             Wound 03/28/24 0309 Other (See comments) medial coccyx MASD (Moisture associated skin damage)    Wound - Properties Group Placement Date: 03/28/24  -DS Placement Time: 0309  -DS Present on Original Admission: N  -DS Side: Other (See comments)  -DS, medial  Orientation: medial  -DS Location: coccyx  -DS Primary Wound Type: MASD  -DS    Retired Wound - Properties Group Placement Date: 03/28/24   -DS Placement Time: 0309 -DS Present on Original Admission: N  -DS Side: Other (See comments)  -DS, medial  Orientation: medial  -DS Location: coccyx  -DS Primary Wound Type: MASD  -DS    Retired Wound - Properties Group Date first assessed: 03/28/24  -DS Time first assessed: 0309  -DS Present on Original Admission: N  -DS Side: Other (See comments)  -DS, medial  Location: coccyx  -DS Primary Wound Type: MASD  -DS      Row Name             Wound 04/03/24 1730 Right anterior hip MASD (Moisture associated skin damage)    Wound - Properties Group Placement Date: 04/03/24  -UT Placement Time: 1730  -UT Side: Right  -UT Orientation: anterior  -UT Location: hip  -UT Primary Wound Type: MASD  -UT    Retired Wound - Properties Group Placement Date: 04/03/24  -UT Placement Time: 1730  -UT Side: Right  -UT Orientation: anterior  -UT Location: hip  -UT Primary Wound Type: MASD  -UT    Retired Wound - Properties Group Date first assessed: 04/03/24  -UT Time first assessed: 1730  -UT Side: Right  -UT Location: hip  -UT Primary Wound Type: MASD  -UT      Row Name             Wound 05/02/24 1033 Left posterior plantar    Wound - Properties Group Placement Date: 05/02/24  -KR Placement Time: 1033  -KR Side: Left  -KR Orientation: posterior  -KR Location: plantar  -KR    Retired Wound - Properties Group Placement Date: 05/02/24  -KR Placement Time: 1033  -KR Side: Left  -KR Orientation: posterior  -KR Location: plantar  -KR    Retired Wound - Properties Group Date first assessed: 05/02/24  -KR Time first assessed: 1033  -KR Side: Left  -KR Location: plantar  -KR      Row Name 05/09/24 1041          Positioning and Restraints    Pre-Treatment Position in bed  -WM     Post Treatment Position bed  -WM     In Bed fowlers;call light within reach;encouraged to call for assist  -WM       Row Name 05/09/24 1041          Progress Summary (PT)    Progress Toward Functional Goals (PT) progress toward functional goals is gradual  -WM     Daily  Progress Summary (PT) Pt was agreeable to BLE exercises in bed, but declined transfers secondary to feeling nausea,  -WM               User Key  (r) = Recorded By, (t) = Taken By, (c) = Cosigned By      Initials Name Provider Type    DS Linda Damico, RN Registered Nurse    Nicole Elise RN Registered Nurse    Adilson Carmona LPN Licensed Nurse    Carson Johnson PTA Physical Therapist Assistant    Almaz Morales RN Registered Nurse                  Section G              Section GG                       Physical Therapy Education       Title: PT OT SLP Therapies (Done)       Topic: Physical Therapy (Done)       Point: Mobility training (Done)       Learning Progress Summary             Patient Acceptance, E,D, DU by PG at 4/24/2024 1039    Acceptance, E,TB, VU by RM at 2/14/2024 0518    Acceptance, E,TB, VU by RM at 1/9/2024 0420    Acceptance, E, VU by CR at 12/30/2023 1750    Acceptance, E, VU by CR at 12/20/2023 1345    Acceptance, E, VU by CR at 12/19/2023 1004    Acceptance, E,TB, VU by RM at 11/30/2023 0420    Acceptance, E,TB, VU by MOE at 11/8/2023 1341                         Point: Precautions (Done)       Learning Progress Summary             Patient Acceptance, E,D, DU by PG at 4/24/2024 1039    Acceptance, E,TB, VU by RM at 2/14/2024 0518    Acceptance, E,TB, VU by RM at 1/9/2024 0420    Acceptance, E, VU by CR at 12/30/2023 1750    Acceptance, E,TB, VU by RM at 11/30/2023 0420    Acceptance, E,TB, VU by MOE at 11/8/2023 1341                                         User Key       Initials Effective Dates Name Provider Type Discipline     06/08/21 -  Bharath Berg, RN Registered Nurse Nurse    ROLY 06/13/22 -  Adilson Baker LPN Licensed Nurse Nurse    PG 06/16/21 -  Kodak Matos OT Occupational Therapist OT    MOE 06/03/21 -  Merlin Rao, PT Physical Therapist PT                  PT Recommendation and Plan     Progress Summary (PT)  Progress Toward Functional Goals (PT):  progress toward functional goals is gradual  Daily Progress Summary (PT): Pt was agreeable to BLE exercises in bed, but declined transfers secondary to feeling nausea,   Outcome Measures       Row Name 05/09/24 1045 05/08/24 1200 05/06/24 1124       How much help from another person do you currently need...    Turning from your back to your side while in flat bed without using bedrails? 3  -WM 3  -VK 3  -WM    Moving from lying on back to sitting on the side of a flat bed without bedrails? 3  -WM 3  -VK 3  -WM    Moving to and from a bed to a chair (including a wheelchair)? 2  -WM 2  -VK 2  -WM    Standing up from a chair using your arms (e.g., wheelchair, bedside chair)? 2  -WM 2  -VK 2  -WM    Climbing 3-5 steps with a railing? 1  -WM 1  -VK 1  -WM    To walk in hospital room? 1  -WM 1  -VK 2  -WM    AM-PAC 6 Clicks Score (PT) 12  -WM 12  -VK 13  -WM    Highest Level of Mobility Goal 4 --> Transfer to chair/commode  -WM 4 --> Transfer to chair/commode  -VK 4 --> Transfer to chair/commode  -WM              User Key  (r) = Recorded By, (t) = Taken By, (c) = Cosigned By      Initials Name Provider Type    Carson Johnson PTA Physical Therapist Assistant    Anya Palumbo PTA Physical Therapist Assistant                     Time Calculation:    PT Charges       Row Name 05/09/24 1040             Time Calculation    PT Received On 05/09/24  -WM         Timed Charges    57774 - PT Therapeutic Exercise Minutes 23  -WM         SNF Physical Therapy Minutes    Skilled Minutes- PT 23 min  -WM         Total Minutes    Timed Charges Total Minutes 23  -WM       Total Minutes 23  -WM                User Key  (r) = Recorded By, (t) = Taken By, (c) = Cosigned By      Initials Name Provider Type    Carson Johnson PTA Physical Therapist Assistant                      PT G-Codes  Outcome Measure Options: AM-PAC 6 Clicks Basic Mobility (PT)  AM-PAC 6 Clicks Score (PT): 12  AM-PAC 6 Clicks Score (OT): 16    Carson Inman  PTA  5/9/2024

## 2024-05-09 NOTE — PLAN OF CARE
Goal Outcome Evaluation:  Plan of Care Reviewed With: patient        Progress: no change  Outcome Evaluation: Alert and oriented x4; able to call for assist as needed. Medicated for left hip pain as needed with Percocet and Baclofen; see MAR for times. Continues to refuse turns but assists with shifting weight after incontinent episodes. Uses urinal independently. Call light within reach; care plan ongoing.

## 2024-05-09 NOTE — PROGRESS NOTES
"Nursing Facility Medication Regimen Review    Potentially Clinically Significant Medication Issues during this review: []Identified   [x]Not identified    Provider acknowledgement required?   []Yes   [x]No    Jose Shaikh is a 63 y.o.male admitted by Jose Myaa MD , on 11/6/2023  1:34 PM , for Debility [R53.81]    Visit Vitals  /55 (BP Location: Left arm, Patient Position: Lying)   Pulse 71   Temp 97.3 °F (36.3 °C) (Oral)   Resp 18   Ht 188 cm (74.02\")   Wt (!) 154 kg (340 lb 6.2 oz)   SpO2 97%   BMI 43.68 kg/m²        Lab Results   Component Value Date    GLUCOSE 83 05/06/2024    BUN 15 05/06/2024    CREATININE 0.46 (L) 05/06/2024    EGFRIFNONA 134 12/20/2021    BCR 32.6 (H) 05/06/2024    K 4.2 05/06/2024    CO2 23.9 05/06/2024    CALCIUM 8.3 (L) 05/06/2024    ALBUMIN 2.8 (L) 04/08/2024    LABIL2 1.3 (L) 08/07/2019    AST 50 (H) 04/08/2024    ALT 36 04/08/2024    WBC 4.06 05/06/2024    HGB 11.9 (L) 05/06/2024    HCT 37.8 05/06/2024    MCV 87.9 05/06/2024    PLT 92 (L) 05/06/2024        Active Ambulatory Problems     Diagnosis Date Noted    Diabetic ulcer of left heel associated with type 2 DM 07/06/2021    Acute osteomyelitis of left calcaneus  08/18/2021    Diabetic ulcer of left heel associated with type 2 DM 08/18/2021    Diabetic ulcer of right midfoot associated with type 2 DM 08/18/2021    Paroxysmal atrial fibrillation 08/31/2021    Essential hypertension 08/31/2021    Hyperlipidemia LDL goal <100 08/31/2021    Cellulitis and abscess of foot 12/01/2021    High alkaline phosphatase level 12/19/2021    Osteomyelitis 10/01/2022    Onychomycosis 10/02/2022    Onychocryptosis 10/02/2022    Foot pain, bilateral 10/02/2022    Osteomyelitis of foot, right, acute 10/05/2022    Cellulitis of right foot 10/05/2022    Type 2 diabetes mellitus, with long-term current use of insulin 11/08/2022    Class 3 severe obesity due to excess calories with serious comorbidity and body mass index (BMI) of 45.0 to 49.9 in " adult 11/08/2022    Anxiety disorder, unspecified 10/07/2022    Claustrophobia 05/02/2022    Dependence on wheelchair 10/27/2022    Depression, unspecified 05/02/2022    Long term (current) use of anticoagulants 05/02/2022    Long term (current) use of oral hypoglycemic drugs 05/02/2022    Wound of foot 05/02/2022    Ulcer of right foot 05/02/2022    Orthostatic hypotension 10/07/2022    Other chronic osteomyelitis, right ankle and foot 10/07/2022    Personal history of nicotine dependence 05/02/2022    Thrombocytopenia, unspecified 10/07/2022    Unspecified open wound, right foot, initial encounter 04/03/2023    Diabetic foot infection 04/03/2023    Subacute osteomyelitis of right foot 04/06/2023    Right foot pain 05/12/2023    Sepsis 05/12/2023    Onychomycosis 05/22/2023    Foot pain, left 05/22/2023    Impaired mobility and ADLs 06/16/2023    Absence of toe of right foot 07/11/2023    Corns and callosity 07/11/2023    Disability of walking 07/11/2023    Fracture 07/11/2023    Limb swelling 07/11/2023    Polyneuropathy 07/11/2023    Pressure ulcer, stage 1 07/11/2023    Shortness of breath 07/11/2023    Generalized weakness 09/10/2023     Resolved Ambulatory Problems     Diagnosis Date Noted    Lactic acidosis 12/01/2021    Abscess of back 12/20/2021     Past Medical History:   Diagnosis Date    Anxiety and depression     Arthritis     Cancer     Chronic pain     Corns and callus     Difficulty walking     Hammertoe     Ingrown toenail     Obesity     Tinea unguium     Type 2 diabetes mellitus with polyneuropathy         Hospital Medications (active)         Dose Frequency Indication    (HOME MED) - Semaglutide (1 MG/DOSE) (OZEMPIC) solution pen-injector 1 mg 1 mg Every 7 Days T2DM and weight loss    acetaminophen (TYLENOL) tablet 650 mg 650 mg Every 4 Hours PRN PRN for mild pain, moderate pain, HA, and fever    apixaban (ELIQUIS) tablet 5 mg 5 mg Every 12 Hours Scheduled Atrial fibrillation    atorvastatin  (LIPITOR) tablet 10 mg 10 mg Nightly HLD    baclofen (LIORESAL) tablet 10 mg 10 mg 3 Times Daily PRN PRN muscle spasms    benzonatate (TESSALON) capsule 100 mg 100 mg 3 Times Daily PRN  PRN cough     bismuth subsalicylate (PEPTO BISMOL) chewable tablet 524 mg 524 mg 3 Times Daily PRN PRN indigestion, heartburn, diarrhea    cetirizine (zyrTEC) tablet 10 mg 10 mg Daily allergies    dextrose (D50W) (25 g/50 mL) IV injection 25 g 25 g Every 15 Minutes PRN PRN hypoglycemia    dextrose (GLUTOSE) oral gel 15 g 15 g Every 15 Minutes PRN PRN hypoglycemia     Diclofenac Sodium (VOLTAREN) 1 % gel 4 g 4 g 4 Times Daily Bilateral knee pain    empagliflozin (JARDIANCE) tablet 10 mg 10 mg Daily T2DM    ferrous sulfate tablet 325 mg 325 mg Daily With Breakfast Anemia/iron supplement    furosemide (LASIX) tablet 40 mg 40 mg Daily Edema     glucagon (GLUCAGEN) injection 1 mg 1 mg Every 15 Minutes PRN PRN hypoglycemia    Hydrocortisone (Perianal) (ANUSOL-HC) 2.5 % rectal cream  2 Times Daily PRN     ibuprofen (ADVIL,MOTRIN) tablet 400 mg 400 mg 2 Times Daily PRN PRN mild pain    insulin detemir (LEVEMIR) injection 40 Units 40 Units Daily T2DM    insulin detemir (LEVEMIR) injection 40 Units 40 Units Nightly T2DM    Insulin Lispro (humaLOG) injection 4-24 Units 4-24 Units 4 Times Daily Before Meals & Nightly T2DM    lisinopril (PRINIVIL,ZESTRIL) tablet 2.5 mg 2.5 mg Every 24 Hours Scheduled Hypertension    melatonin tablet 10 mg 10 mg Nightly Insomnia    ondansetron (ZOFRAN) injection 4 mg 4 mg Every 6 Hours PRN PRN nausea, vomiting    ondansetron ODT (ZOFRAN-ODT) disintegrating tablet 4 mg 4 mg Every 6 Hours PRN PRN nausea, vomiting    oxyCODONE-acetaminophen (PERCOCET)  MG per tablet 1 tablet 1 tablet Every 8 Hours PRN PRN severe pain    pregabalin (LYRICA) capsule 100 mg 100 mg 3 Times Daily Neuropathic pain    saccharomyces boulardii (FLORASTOR) capsule 500 mg 500 mg 2 Times Daily Gut health    sertraline (ZOLOFT) tablet 50 mg  50 mg Nightly Anxiety disorder    simethicone (MYLICON) chewable tablet 80 mg 80 mg 4 Times Daily PRN PRN flatulence    vitamin B-12 (CYANOCOBALAMIN) tablet 1,000 mcg 1,000 mcg Daily Dietary supplement, anemia            Psychotropic Medications  Sertraline    Potentially Clinically Significant Medication Issues/PharmD Recommendations:   Psychotropic Medication: established home med  Opioid Use Review: Percocet 10 with stop date of 20 days - continue to reassess need for percocet during prolonged stay   Medication without an appropriate indication: none  Untreated indication: none  Missing Duration: none  Excessive or Inadequate Dose: none  Ineffective Drug Therapy: none  Medication Adverse Reactions/Consequences: none  Medication Monitoring: none  Drug Interactions: apixaban and ibuprofen - monitor for increased bleed risk   Duplicate Therapy: none  PTA Medication Omissions (not currently ordered): digoxin  Safety: none      In completing this Drug Regimen Review for [facility], have reviewed the electronic medical chart contents, including but not limited to the following: Medication Administration Records (MAR), Prescriber's orders, Progress, nursing and consultants' notes, Laboratory and diagnostic test results, Other sources of information about documented expressions or indications of distress and/or changes in condition.      [Asthma] : asthma [Eczematous rashes] : eczematous rashes [Food Allergies] : food allergies [de-identified] : 11 yr old with no significant previous history of AR now with new onset complaints of sneezing, nasal congestion, itchy swollen water eyes starting this late April into most of May. Pt took Claritin 10 mg qd, Flonase (minimal use) and Zaditor PRN with limited relief.  Increase complaints are also noted throughout the year - mild, with dog and cat exposure and possibly dust exposure.

## 2024-05-09 NOTE — SIGNIFICANT NOTE
Wound Eval / Progress Noted    KEO Dominguez     Patient Name: Jose Shaikh  : 1960  MRN: [id number]  Today's Date: 2024                 Admit Date: 2023    Visit Dx:    ICD-10-CM ICD-9-CM   1. Difficulty in walking  R26.2 719.7   2. Type 2 diabetes mellitus with other specified complication, unspecified whether long term insulin use  E11.69 250.80         Debility    Ulcer of right foot    Ulcerated, foot, left, limited to breakdown of skin    Onychomycosis        Past Medical History:   Diagnosis Date    Absence of toe of right foot     Acute osteomyelitis of left calcaneus  2021    Anxiety and depression     Arthritis     Cancer     Chronic pain     STATES HIS PAIN IS 10/10 AAT    Claustrophobia     Corns and callus     Diabetic ulcer of left heel associated with type 2 DM 2021    Diabetic ulcer of left heel associated with type 2 DM 2021    Diabetic ulcer of right midfoot associated with type 2 DM 2021    Difficulty walking     Essential hypertension 2021    Hammertoe     Hyperlipidemia LDL goal <100 2021    Ingrown toenail     Obesity     Paroxysmal atrial fibrillation 2021    Polyneuropathy     Pressure ulcer, stage 1     Tinea unguium     Type 2 diabetes mellitus with polyneuropathy         Past Surgical History:   Procedure Laterality Date    CYST REMOVAL      center of back; benign    EYE SURGERY      INCISION AND DRAINAGE ABSCESS      back    INCISION AND DRAINAGE LEG Right 12/10/2021    Procedure: INCISION AND DRAINAGE LOWER EXTREMITY;  Surgeon: Ash Leyva DPM;  Location: [facility] MAIN OR;  Service: Podiatry;  Laterality: Right;    OTHER SURGICAL HISTORY      Surgical clips left foot    TOE SURGERY Right     Removal of 5th toe    TRANS METATARSAL AMPUTATION Right 2021    Procedure: AMPUTATION TRANS METATARSAL;  Surgeon: Ash Leyva DPM;  Location: [facility] MAIN OR;  Service: Podiatry;  Laterality: Right;    VASCULAR  SURGERY      WRIST SURGERY Left     repair of injury         Physical Assessment:  Rash 04/06/24 1659 Left calf plaque (Active)   Wound Image  7x 4cm 05/09/24 1119   Distribution localized 05/09/24 1119   Color pink;red 05/09/24 1119   Configuration/Shape symmetrical;oval 05/09/24 1119   Borders raised;distinct 05/09/24 1119   Characteristics edema;scaly 05/09/24 1119   Care, Rash open to air 05/09/24 1119       Rash 04/17/24 1443 Right lower leg macular (Active)   Wound Image    05/09/24 1119   Distribution localized 05/08/24 1928   Color red 05/08/24 1928   Configuration/Shape symmetrical 05/08/24 1928   Borders raised 05/08/24 1928   Characteristics raised 05/08/24 1928   Care, Rash open to air 05/08/24 1928       Wound 03/27/24 1045 Right anterior Skin Tear (Active)   Wound Image   05/09/24 1119   Dressing Appearance intact;moist drainage 05/09/24 1119   Closure None 05/09/24 1119   Base moist;red 05/09/24 1119   Red (%), Wound Tissue Color 100 05/09/24 1119   Periwound intact;dry;pink 05/09/24 1119   Periwound Temperature warm 05/09/24 1119   Periwound Skin Turgor soft 05/09/24 1119   Edges open 05/09/24 1119   Wound Length (cm) 2 cm 05/09/24 1119   Wound Width (cm) 0.4 cm 05/09/24 1119   Wound Depth (cm) 0.1 cm 05/09/24 1119   Wound Surface Area (cm^2) 0.8 cm^2 05/09/24 1119   Wound Volume (cm^3) 0.08 cm^3 05/09/24 1119   Drainage Characteristics/Odor serosanguineous 05/09/24 1119   Drainage Amount small 05/09/24 1119   Care, Wound cleansed with;sterile normal saline 05/09/24 1119   Dressing Care dressing applied;border dressing;non-adherent;petroleum-based;silicone 05/09/24 1119   Periwound Care absorptive dressing applied 05/09/24 1119       Wound 04/03/24 1730 Right anterior hip MASD (Moisture associated skin damage) (Active)   Dressing Appearance open to air 05/09/24 1119   Closure None 05/09/24 1119   Base moist;pink 05/09/24 1119   Periwound intact 05/09/24 1119   Periwound Temperature warm 05/09/24 1119    Periwound Skin Turgor soft 05/09/24 1119   Edges rolled/closed 05/09/24 1119   Drainage Amount none 05/09/24 1119   Dressing Care open to air 05/09/24 1119       Wound 05/02/24 1033 Left posterior plantar (Active)   Wound Image   05/09/24 1119   Dressing Appearance intact;no drainage 05/09/24 1119   Closure None 05/09/24 1119   Base red;pink;moist 05/09/24 1119   Periwound blistered;pustules;warm 05/09/24 1119   Periwound Temperature warm 05/09/24 1119   Periwound Skin Turgor soft 05/09/24 1119   Edges open;rolled/closed 05/09/24 1119   Wound Length (cm) 1 cm 05/09/24 1119   Wound Width (cm) 0.2 cm 05/09/24 1119   Wound Depth (cm) 0.1 cm 05/09/24 1119   Wound Surface Area (cm^2) 0.2 cm^2 05/09/24 1119   Wound Volume (cm^3) 0.02 cm^3 05/09/24 1119   Drainage Characteristics/Odor serosanguineous 05/09/24 1119   Drainage Amount scant 05/09/24 1119   Care, Wound sterile normal saline;cleansed with 05/09/24 1119   Dressing Care dressing applied;border dressing;non-adherent;petroleum-based;silicone 05/09/24 1119   Periwound Care absorptive dressing applied 05/09/24 1119       Right lateral abdomen  Wound Check / Follow-up: Patient seen today for wound follow-up/wound check.  Patient is currently sitting up in his bed awake alert and oriented.  He is agreeable to assessment at this time.    Patient continues to have chronic discoloration to bilateral lower extremities.  Rash to right lower extremity with resolution and only residual scattered redness remaining.  Left posterior calf continues to have raised dry scaled area measuring 7 cm x 4 cm.  No active drainage or oozing.  Overall with improvement noted.  Recommending to continue skin care/hygiene.    Right distal foot continues to have traumatic injury.  Dressing to site removed.  Wound base is red and moist.  Serosanguineous drainage noted with cleansing with normal saline and gauze.  Recommending to continue daily dressing changes with nonadherent petroleum based  gauze and silicone border dressing.    Patient was scarring to left plantar aspect that was previously closed.  This area is now open with superficial tissue loss.  Wound base is red and moist.  Surrounding tissue with a small yellow blistering/pustules.  No active drainage from these pustules or blisters.  Scant amount of serosanguineous drainage noted to left foot wound.  Cleansed with normal saline and gauze.  Recommending daily dressing change with nonadherent petroleum-based gauze and silicone border dressing.    Patient has superficial abrasions to right lateral abdomen.  He states that he scratched his abdomen on the side rail of the bedside commode.  There is no active drainage or oozing.  Topical treatment with orange barrier cream is currently in use recommending to continue topical treatments at present time.    Moisture with minimal to no redness noted with an abdominal fold.  Bilateral groin continue to present with moisture.  There is no fungal presentation at this time.  Bilateral gluteal aspects continue to have moisture noted within gluteal crease, however there is no fungal presentation and no skin damage at present time recommending to continue skin protection and moisture prevention.    Impression: Traumatic injuries to right distal foot and left plantar foot.  Raised dry scaly tissue to left posterior calf.  Abrasion to right lateral abdomen.  Moisture within skin folds and creases.    Short term goals: Skin care/hygiene, skin protection, moisture prevention, pressure reduction.  Daily dressing changes.  Topical treatments.    Bettye Crews RN    5/9/2024    15:03 EDT

## 2024-05-10 LAB
GLUCOSE BLDC GLUCOMTR-MCNC: 104 MG/DL (ref 70–99)
GLUCOSE BLDC GLUCOMTR-MCNC: 139 MG/DL (ref 70–99)
GLUCOSE BLDC GLUCOMTR-MCNC: 139 MG/DL (ref 70–99)
GLUCOSE BLDC GLUCOMTR-MCNC: 157 MG/DL (ref 70–99)
GLUCOSE BLDC GLUCOMTR-MCNC: 179 MG/DL (ref 70–99)

## 2024-05-10 PROCEDURE — 63710000001 INSULIN DETEMIR PER 5 UNITS: Performed by: PHYSICIAN ASSISTANT

## 2024-05-10 PROCEDURE — 63710000001 INSULIN LISPRO (HUMAN) PER 5 UNITS: Performed by: INTERNAL MEDICINE

## 2024-05-10 PROCEDURE — 82948 REAGENT STRIP/BLOOD GLUCOSE: CPT

## 2024-05-10 PROCEDURE — 97110 THERAPEUTIC EXERCISES: CPT

## 2024-05-10 PROCEDURE — 82948 REAGENT STRIP/BLOOD GLUCOSE: CPT | Performed by: INTERNAL MEDICINE

## 2024-05-10 PROCEDURE — 97530 THERAPEUTIC ACTIVITIES: CPT

## 2024-05-10 RX ADMIN — Medication 10 MG: at 20:48

## 2024-05-10 RX ADMIN — LISINOPRIL 2.5 MG: 2.5 TABLET ORAL at 08:36

## 2024-05-10 RX ADMIN — FUROSEMIDE 40 MG: 40 TABLET ORAL at 08:36

## 2024-05-10 RX ADMIN — BISMUTH SUBSALICYLATE 524 MG: 262 TABLET, CHEWABLE ORAL at 03:57

## 2024-05-10 RX ADMIN — BACLOFEN 10 MG: 10 TABLET ORAL at 06:06

## 2024-05-10 RX ADMIN — CETIRIZINE HYDROCHLORIDE 10 MG: 10 TABLET, FILM COATED ORAL at 08:36

## 2024-05-10 RX ADMIN — APIXABAN 5 MG: 5 TABLET, FILM COATED ORAL at 08:36

## 2024-05-10 RX ADMIN — DICLOFENAC SODIUM 4 G: 10 GEL TOPICAL at 20:49

## 2024-05-10 RX ADMIN — INSULIN DETEMIR 40 UNITS: 100 INJECTION, SOLUTION SUBCUTANEOUS at 20:47

## 2024-05-10 RX ADMIN — DICLOFENAC SODIUM 4 G: 10 GEL TOPICAL at 17:13

## 2024-05-10 RX ADMIN — Medication 500 MG: at 08:36

## 2024-05-10 RX ADMIN — CYANOCOBALAMIN TAB 500 MCG 1000 MCG: 500 TAB at 08:35

## 2024-05-10 RX ADMIN — IBUPROFEN 400 MG: 400 TABLET ORAL at 03:57

## 2024-05-10 RX ADMIN — PREGABALIN 100 MG: 100 CAPSULE ORAL at 20:48

## 2024-05-10 RX ADMIN — OXYCODONE HYDROCHLORIDE AND ACETAMINOPHEN 1 TABLET: 10; 325 TABLET ORAL at 15:50

## 2024-05-10 RX ADMIN — BACLOFEN 10 MG: 10 TABLET ORAL at 15:50

## 2024-05-10 RX ADMIN — INSULIN DETEMIR 40 UNITS: 100 INJECTION, SOLUTION SUBCUTANEOUS at 08:35

## 2024-05-10 RX ADMIN — FERROUS SULFATE TAB 325 MG (65 MG ELEMENTAL FE) 325 MG: 325 (65 FE) TAB at 08:36

## 2024-05-10 RX ADMIN — EMPAGLIFLOZIN 10 MG: 10 TABLET, FILM COATED ORAL at 08:36

## 2024-05-10 RX ADMIN — PREGABALIN 100 MG: 100 CAPSULE ORAL at 08:36

## 2024-05-10 RX ADMIN — Medication 500 MG: at 20:48

## 2024-05-10 RX ADMIN — SERTRALINE HYDROCHLORIDE 50 MG: 50 TABLET ORAL at 20:48

## 2024-05-10 RX ADMIN — DICLOFENAC SODIUM 4 G: 10 GEL TOPICAL at 08:35

## 2024-05-10 RX ADMIN — ATORVASTATIN CALCIUM 10 MG: 10 TABLET, FILM COATED ORAL at 20:48

## 2024-05-10 RX ADMIN — DICLOFENAC SODIUM 4 G: 10 GEL TOPICAL at 11:42

## 2024-05-10 RX ADMIN — OXYCODONE HYDROCHLORIDE AND ACETAMINOPHEN 1 TABLET: 10; 325 TABLET ORAL at 06:06

## 2024-05-10 RX ADMIN — PREGABALIN 100 MG: 100 CAPSULE ORAL at 17:13

## 2024-05-10 RX ADMIN — INSULIN LISPRO 4 UNITS: 100 INJECTION, SOLUTION INTRAVENOUS; SUBCUTANEOUS at 17:13

## 2024-05-10 RX ADMIN — APIXABAN 5 MG: 5 TABLET, FILM COATED ORAL at 20:48

## 2024-05-10 NOTE — THERAPY TREATMENT NOTE
SNF - Occupational Therapy Treatment Note  [facility] Dominguez    Patient Name: Jose Shaikh  : 1960    MRN: [id number]                              Today's Date: 5/10/2024       Admit Date: 2023    Visit Dx:     ICD-10-CM ICD-9-CM   1. Difficulty in walking  R26.2 719.7   2. Type 2 diabetes mellitus with other specified complication, unspecified whether long term insulin use  E11.69 250.80     Patient Active Problem List   Diagnosis    Diabetic ulcer of left heel associated with type 2 DM    Acute osteomyelitis of left calcaneus     Diabetic ulcer of left heel associated with type 2 DM    Diabetic ulcer of right midfoot associated with type 2 DM    Paroxysmal atrial fibrillation    Essential hypertension    Hyperlipidemia LDL goal <100    Cellulitis and abscess of foot    High alkaline phosphatase level    Osteomyelitis    Onychomycosis    Onychocryptosis    Foot pain, bilateral    Osteomyelitis of foot, right, acute    Cellulitis of right foot    Type 2 diabetes mellitus, with long-term current use of insulin    Class 3 severe obesity due to excess calories with serious comorbidity and body mass index (BMI) of 45.0 to 49.9 in adult    Anxiety disorder, unspecified    Claustrophobia    Dependence on wheelchair    Depression, unspecified    Long term (current) use of anticoagulants    Long term (current) use of oral hypoglycemic drugs    Wound of foot    Ulcer of right foot    Orthostatic hypotension    Other chronic osteomyelitis, right ankle and foot    Personal history of nicotine dependence    Thrombocytopenia, unspecified    Unspecified open wound, right foot, initial encounter    Diabetic foot infection    Subacute osteomyelitis of right foot    Right foot pain    Sepsis    Onychomycosis    Foot pain, left    Impaired mobility and ADLs    Absence of toe of right foot    Corns and callosity    Disability of walking    Fracture    Limb swelling    Polyneuropathy    Pressure ulcer, stage 1    Shortness of  breath    Generalized weakness    Debility    Ulcerated, foot, left, limited to breakdown of skin    Onychomycosis     Past Medical History:   Diagnosis Date    Absence of toe of right foot     Acute osteomyelitis of left calcaneus  08/18/2021    Anxiety and depression     Arthritis     Cancer     Chronic pain     STATES HIS PAIN IS 10/10 AAT    Claustrophobia     Corns and callus     Diabetic ulcer of left heel associated with type 2 DM 08/18/2021    Diabetic ulcer of left heel associated with type 2 DM 07/06/2021    Diabetic ulcer of right midfoot associated with type 2 DM 08/18/2021    Difficulty walking     Essential hypertension 08/31/2021    Hammertoe     Hyperlipidemia LDL goal <100 08/31/2021    Ingrown toenail     Obesity     Paroxysmal atrial fibrillation 08/31/2021    Polyneuropathy     Pressure ulcer, stage 1     Tinea unguium     Type 2 diabetes mellitus with polyneuropathy      Past Surgical History:   Procedure Laterality Date    CYST REMOVAL      center of back; benign    EYE SURGERY      INCISION AND DRAINAGE ABSCESS      back    INCISION AND DRAINAGE LEG Right 12/10/2021    Procedure: INCISION AND DRAINAGE LOWER EXTREMITY;  Surgeon: Ash Leyva DPM;  Location: [facility] OR;  Service: Podiatry;  Laterality: Right;    OTHER SURGICAL HISTORY      Surgical clips left foot    TOE SURGERY Right     Removal of 5th toe    TRANS METATARSAL AMPUTATION Right 12/02/2021    Procedure: AMPUTATION TRANS METATARSAL;  Surgeon: Ash Leyva DPM;  Location: [facility] OR;  Service: Podiatry;  Laterality: Right;    VASCULAR SURGERY      WRIST SURGERY Left     repair of injury      General Information       Row Name 05/10/24 1332          OT Time and Intention    Document Type therapy note (daily note)  -LF     Mode of Treatment individual therapy;occupational therapy  -LF               User Key  (r) = Recorded By, (t) = Taken By, (c) = Cosigned By      Initials Name Provider Type    LF  Michelle Howard OT Occupational Therapist                     Mobility/ADL's       Row Name 05/10/24 1332          Bed Mobility    Bed Mobility sit-supine  -LF     Sit-Supine [place] (Bed Mobility) minimum assist (75% patient effort)  -LF     Bed Mobility, Safety Issues decreased use of legs for bridging/pushing  -LF     Assistive Device (Bed Mobility) bed rails  -LF     Comment, (Bed Mobility) Pt seated on EOB with PTA prior to OT's arrival.  -       Row Name 05/10/24 1332          Transfers    Transfers sit-stand transfer;stand-sit transfer  -LF     Comment, (Transfers) Sit to stand completeed x4 reps. Pt unable to advance RLE d/t pain and difficulty weightbearing. Max cues required throughout for proper technique, safety, and increasing base of support.  -       Row Name 05/10/24 1332          Sit-Stand Transfer    Sit-Stand [place] (Transfers) moderate assist (50% patient effort);2 person assist;verbal cues  -LF     Assistive Device (Sit-Stand Transfers) bariatric;walker, front-wheeled  -LF       Row Name 05/10/24 1332          Stand-Sit Transfer    Stand-Sit [place] (Transfers) moderate assist (50% patient effort);2 person assist;verbal cues  -LF     Assistive Device (Stand-Sit Transfers) bariatric;walker, front-wheeled  -LF       Row Name 05/10/24 1332          Mobility    Extremity Weight-bearing Status left lower extremity;right lower extremity  -LF     Left Lower Extremity (Weight-bearing Status) weight-bearing as tolerated (WBAT)  -LF     Right Lower Extremity (Weight-bearing Status) weight-bearing as tolerated (WBAT)  -LF               User Key  (r) = Recorded By, (t) = Taken By, (c) = Cosigned By      Initials Name Provider Type     Michelle Howard OT Occupational Therapist                   Obj/Interventions       Row Name 05/10/24 1330          Motor Skills    Motor Skills functional endurance  -LF     Functional Endurance Fair  -LF     Therapeutic Exercise aerobic  -LF        Row Name 05/10/24 1331          Aerobic Exercise    Type (Aerobic Exercise) other (see comments)  Omnicycle  -LF     Time Performed (Aerobic Exercise) x8 minutes on level 5 resistance  -LF               User Key  (r) = Recorded By, (t) = Taken By, (c) = Cosigned By      Initials Name Provider Type    LF Michelle Howard OT Occupational Therapist                   Goals/Plan    No documentation.                  Clinical Impression       Row Name 05/10/24 1336          Pain Assessment    Pain Intervention(s) Nursing Notified  -     Additional Documentation Pain Scale: FACES Pre/Post-Treatment (Group)  -LF       Row Name 05/10/24 1330          Pain Scale: FACES Pre/Post-Treatment    Pain: FACES Scale, Pretreatment 2-->hurts little bit  -LF     Posttreatment Pain Rating 4-->hurts little more  -LF     Pain Location - Side/Orientation Right  -LF     Pain Location lower  -LF     Pain Location - extremity  -LF       Row Name 05/10/24 6667          Plan of Care Review    Plan of Care Reviewed With patient  -LF     Progress improving  -LF     Outcome Evaluation Pt agreeable to functional transfer training this session but was unable to advance BLEs d/t RLE pain and difficulty weightshifting. Later in session he was agreeable to BUE exercises on EOB, tolerating level 5 resistance on Omnicycle x8 minutes. He would benefit from continued OT services to maximize independence.  -LF       Row Name 05/10/24 1330          Vital Signs    O2 Delivery Pre Treatment room air  -LF     O2 Delivery Intra Treatment room air  -LF     O2 Delivery Post Treatment room air  -       Row Name 05/10/24 1334          Positioning and Restraints    Pre-Treatment Position in bed  -LF     Post Treatment Position bed  -LF     In Bed fowlers;call light within reach;encouraged to call for assist  -LF               User Key  (r) = Recorded By, (t) = Taken By, (c) = Cosigned By      Initials Name Provider Type    LF Michelle Howard, BEKAH Occupational  Therapist                   Outcome Measures       Row Name 05/10/24 1304 05/10/24 0800       How much help from another person do you currently need...    Turning from your back to your side while in flat bed without using bedrails? 3  -WM 3  -AA    Moving from lying on back to sitting on the side of a flat bed without bedrails? 3  -WM 3  -AA    Moving to and from a bed to a chair (including a wheelchair)? 2  -WM 2  -AA    Standing up from a chair using your arms (e.g., wheelchair, bedside chair)? 2  -WM 2  -AA    Climbing 3-5 steps with a railing? 1  -WM 1  -AA    To walk in hospital room? 1  -WM 1  -AA    AM-PAC 6 Clicks Score (PT) 12  -WM 12  -AA    Highest Level of Mobility Goal 4 --> Transfer to chair/commode  -WM 4 --> Transfer to chair/commode  -AA              User Key  (r) = Recorded By, (t) = Taken By, (c) = Cosigned By      Initials Name Provider Type    AA Uyen Chandra, RN Registered Nurse    Carson Johnson PTA Physical Therapist Assistant                  Section G              Section GG                         Occupational Therapy Education       Title: PT OT SLP Therapies (Done)       Topic: Occupational Therapy (Done)       Point: ADL training (Done)       Description:   Instruct learner(s) on proper safety adaptation and remediation techniques during self care or transfers.   Instruct in proper use of assistive devices.                  Learning Progress Summary             Patient Acceptance, E,D, DU by PG at 4/24/2024 1039    Acceptance, E,TB, VU by RM at 2/14/2024 0518    Acceptance, E,TB, VU by  at 1/9/2024 0420    Acceptance, E, VU by CR at 12/30/2023 1750    Acceptance, E,TB, VU by RM at 11/30/2023 0420    Acceptance, E,D, DU by PG at 11/7/2023 0932                         Point: Home exercise program (Done)       Description:   Instruct learner(s) on appropriate technique for monitoring, assisting and/or progressing therapeutic exercises/activities.                  Learning  Progress Summary             Patient Acceptance, E,D, DU by PG at 4/24/2024 1039    Acceptance, E,TB, VU by RM at 2/14/2024 0518    Acceptance, E,TB, VU by RM at 1/9/2024 0420    Acceptance, E, VU by CR at 12/30/2023 1750    Acceptance, E,TB, VU by RM at 11/30/2023 0420    Acceptance, E,D, DU by PG at 11/7/2023 0932                         Point: Precautions (Done)       Description:   Instruct learner(s) on prescribed precautions during self-care and functional transfers.                  Learning Progress Summary             Patient Acceptance, E,D, DU by PG at 4/24/2024 1039    Acceptance, E,TB, VU by RM at 2/14/2024 0518    Acceptance, E,TB, VU by RM at 1/9/2024 0420    Acceptance, E, VU by CR at 12/30/2023 1750    Acceptance, E,TB, VU by RM at 11/30/2023 0420    Acceptance, E,D, DU by PG at 11/7/2023 0932                         Point: Body mechanics (Done)       Description:   Instruct learner(s) on proper positioning and spine alignment during self-care, functional mobility activities and/or exercises.                  Learning Progress Summary             Patient Acceptance, E,D, DU by PG at 4/24/2024 1039    Acceptance, E,TB, VU by RM at 2/14/2024 0518    Acceptance, E,TB, VU by RM at 1/9/2024 0420    Acceptance, E, VU by CR at 12/30/2023 1750    Acceptance, E,TB, VU by RM at 11/30/2023 0420    Acceptance, E,D, DU by PG at 11/7/2023 0932                                         User Key       Initials Effective Dates Name Provider Type Discipline     06/08/21 -  Bharath Berg, RN Registered Nurse Nurse    CR 06/13/22 -  Adilson Baker LPN Licensed Nurse Nurse    PG 06/16/21 -  Kodak Matos OT Occupational Therapist OT                  OT Recommendation and Plan     Plan of Care Review  Plan of Care Reviewed With: patient  Progress: improving  Outcome Evaluation: Pt agreeable to functional transfer training this session but was unable to advance BLEs d/t RLE pain and difficulty weightshifting. Later in  session he was agreeable to BUE exercises on EOB, tolerating level 5 resistance on Omnicycle x8 minutes. He would benefit from continued OT services to maximize independence.     Time Calculation:         Time Calculation- OT       Row Name 05/10/24 1343             Time Calculation- OT    OT Received On 05/10/24  -LF      OT Goal Re-Cert Due Date 05/12/24  -LF         Timed Charges    53454 - OT Therapeutic Exercise Minutes 8  -LF      07234 - OT Therapeutic Activity Minutes 20  -LF         Total Minutes    Timed Charges Total Minutes 28  -LF       Total Minutes 28  -LF                User Key  (r) = Recorded By, (t) = Taken By, (c) = Cosigned By      Initials Name Provider Type    LF Michelle Howard OT Occupational Therapist                  Therapy Charges for Today       Code Description Service Date Service Provider Modifiers Qty    98210785854 HC OT THER PROC EA 15 MIN 5/10/2024 Michelle Howard OT GO 1    21815583309 HC OT THERAPEUTIC ACT EA 15 MIN 5/10/2024 Michelle Howard OT GO 1                 Michelle Howard OT  5/10/2024   normal (ped)...

## 2024-05-10 NOTE — PLAN OF CARE
Goal Outcome Evaluation:  Plan of Care Reviewed With: patient        Progress: no change  Outcome Evaluation: AOx4, call light and personal items within reach. Able to make needs known to staffing. Urinal at bedside. C/o hip and shoulder pain, medication given. Postional changes done indpedendently. Pillow support for left arm. Wound care tolorated well. Con't to check blood sugars (see labs). Stood up with PT, tolorated fair. Encouragment given. Con't plan of care

## 2024-05-10 NOTE — PLAN OF CARE
Goal Outcome Evaluation:  Plan of Care Reviewed With: patient        Progress: no change  Outcome Evaluation: Alert and oriented x4; able to make needs known to staff. No significant changes this shift. Medicated with Motrin, Baclofen, and Percocet for left shoulder and hip pain with relief; see MAR for specific times. Complained of stomach pain x1; Pepto Bismol administered with relief. Using urinal independently; continues to have incontinent episodes of bowels. Call light within reach; care plan ongoing.

## 2024-05-11 LAB
GLUCOSE BLDC GLUCOMTR-MCNC: 109 MG/DL (ref 70–99)
GLUCOSE BLDC GLUCOMTR-MCNC: 121 MG/DL (ref 70–99)
GLUCOSE BLDC GLUCOMTR-MCNC: 141 MG/DL (ref 70–99)
GLUCOSE BLDC GLUCOMTR-MCNC: 163 MG/DL (ref 70–99)
GLUCOSE BLDC GLUCOMTR-MCNC: 180 MG/DL (ref 70–99)

## 2024-05-11 PROCEDURE — 63710000001 INSULIN LISPRO (HUMAN) PER 5 UNITS: Performed by: INTERNAL MEDICINE

## 2024-05-11 PROCEDURE — 97110 THERAPEUTIC EXERCISES: CPT

## 2024-05-11 PROCEDURE — 82948 REAGENT STRIP/BLOOD GLUCOSE: CPT

## 2024-05-11 PROCEDURE — 97530 THERAPEUTIC ACTIVITIES: CPT

## 2024-05-11 PROCEDURE — 63710000001 INSULIN DETEMIR PER 5 UNITS: Performed by: PHYSICIAN ASSISTANT

## 2024-05-11 PROCEDURE — 82948 REAGENT STRIP/BLOOD GLUCOSE: CPT | Performed by: INTERNAL MEDICINE

## 2024-05-11 RX ADMIN — DICLOFENAC SODIUM 4 G: 10 GEL TOPICAL at 18:13

## 2024-05-11 RX ADMIN — ACETAMINOPHEN 650 MG: 325 TABLET ORAL at 07:28

## 2024-05-11 RX ADMIN — LISINOPRIL 2.5 MG: 2.5 TABLET ORAL at 10:24

## 2024-05-11 RX ADMIN — APIXABAN 5 MG: 5 TABLET, FILM COATED ORAL at 10:24

## 2024-05-11 RX ADMIN — INSULIN LISPRO 4 UNITS: 100 INJECTION, SOLUTION INTRAVENOUS; SUBCUTANEOUS at 21:24

## 2024-05-11 RX ADMIN — OXYCODONE HYDROCHLORIDE AND ACETAMINOPHEN 1 TABLET: 10; 325 TABLET ORAL at 10:24

## 2024-05-11 RX ADMIN — FERROUS SULFATE TAB 325 MG (65 MG ELEMENTAL FE) 325 MG: 325 (65 FE) TAB at 10:24

## 2024-05-11 RX ADMIN — IBUPROFEN 400 MG: 400 TABLET ORAL at 05:05

## 2024-05-11 RX ADMIN — DICLOFENAC SODIUM 4 G: 10 GEL TOPICAL at 11:52

## 2024-05-11 RX ADMIN — OXYCODONE HYDROCHLORIDE AND ACETAMINOPHEN 1 TABLET: 10; 325 TABLET ORAL at 19:15

## 2024-05-11 RX ADMIN — Medication 10 MG: at 21:24

## 2024-05-11 RX ADMIN — EMPAGLIFLOZIN 10 MG: 10 TABLET, FILM COATED ORAL at 10:24

## 2024-05-11 RX ADMIN — BACLOFEN 10 MG: 10 TABLET ORAL at 01:42

## 2024-05-11 RX ADMIN — DICLOFENAC SODIUM 4 G: 10 GEL TOPICAL at 21:25

## 2024-05-11 RX ADMIN — PREGABALIN 100 MG: 100 CAPSULE ORAL at 15:31

## 2024-05-11 RX ADMIN — INSULIN DETEMIR 40 UNITS: 100 INJECTION, SOLUTION SUBCUTANEOUS at 21:25

## 2024-05-11 RX ADMIN — CETIRIZINE HYDROCHLORIDE 10 MG: 10 TABLET, FILM COATED ORAL at 10:24

## 2024-05-11 RX ADMIN — ATORVASTATIN CALCIUM 10 MG: 10 TABLET, FILM COATED ORAL at 21:24

## 2024-05-11 RX ADMIN — Medication 500 MG: at 10:24

## 2024-05-11 RX ADMIN — DICLOFENAC SODIUM 4 G: 10 GEL TOPICAL at 07:28

## 2024-05-11 RX ADMIN — APIXABAN 5 MG: 5 TABLET, FILM COATED ORAL at 21:24

## 2024-05-11 RX ADMIN — CYANOCOBALAMIN TAB 500 MCG 1000 MCG: 500 TAB at 10:24

## 2024-05-11 RX ADMIN — FUROSEMIDE 40 MG: 40 TABLET ORAL at 10:24

## 2024-05-11 RX ADMIN — BACLOFEN 10 MG: 10 TABLET ORAL at 19:15

## 2024-05-11 RX ADMIN — PREGABALIN 100 MG: 100 CAPSULE ORAL at 10:24

## 2024-05-11 RX ADMIN — OXYCODONE HYDROCHLORIDE AND ACETAMINOPHEN 1 TABLET: 10; 325 TABLET ORAL at 01:42

## 2024-05-11 RX ADMIN — ACETAMINOPHEN 650 MG: 325 TABLET ORAL at 15:31

## 2024-05-11 RX ADMIN — BACLOFEN 10 MG: 10 TABLET ORAL at 10:24

## 2024-05-11 RX ADMIN — Medication 500 MG: at 21:24

## 2024-05-11 RX ADMIN — INSULIN DETEMIR 40 UNITS: 100 INJECTION, SOLUTION SUBCUTANEOUS at 10:23

## 2024-05-11 RX ADMIN — PREGABALIN 100 MG: 100 CAPSULE ORAL at 21:24

## 2024-05-11 RX ADMIN — SERTRALINE HYDROCHLORIDE 50 MG: 50 TABLET ORAL at 21:24

## 2024-05-11 NOTE — PLAN OF CARE
Goal Outcome Evaluation:  Plan of Care Reviewed With: patient        Progress: no change  Outcome Evaluation: Vital signs stable. Alert and oriented. Able to communicate needs. Continues to refuse turning and repositioning by staff every two hours. Uses trapeze above bed to reposition self. Incontinent of bowel. Continue plan of care

## 2024-05-11 NOTE — PLAN OF CARE
Goal Outcome Evaluation:  Plan of Care Reviewed With: patient        Progress: no change  Outcome Evaluation: Patient is alert and oriented. Refused turns multiple times this shift and refused to allow nurse and CNA to give bath. Refused to allow nurse to assess abdominal folds as well. Educated on need for hygiene. Given PRN Tylenol at 0728 and 1531 for right hip breakthrough pain, med somewhat effective. Given PRN Perocet and Baclofen at 1024 also for left hip pain. Meds effective. Con't current POC.

## 2024-05-11 NOTE — THERAPY TREATMENT NOTE
SNF - Physical Therapy Progress Note  KEO Dominguez     Patient Name: Jose Shaikh  : 1960  MRN: [id number]  Today's Date: 2024      Visit Dx:    ICD-10-CM ICD-9-CM   1. Difficulty in walking  R26.2 719.7   2. Type 2 diabetes mellitus with other specified complication, unspecified whether long term insulin use  E11.69 250.80     Patient Active Problem List   Diagnosis    Diabetic ulcer of left heel associated with type 2 DM    Acute osteomyelitis of left calcaneus     Diabetic ulcer of left heel associated with type 2 DM    Diabetic ulcer of right midfoot associated with type 2 DM    Paroxysmal atrial fibrillation    Essential hypertension    Hyperlipidemia LDL goal <100    Cellulitis and abscess of foot    High alkaline phosphatase level    Osteomyelitis    Onychomycosis    Onychocryptosis    Foot pain, bilateral    Osteomyelitis of foot, right, acute    Cellulitis of right foot    Type 2 diabetes mellitus, with long-term current use of insulin    Class 3 severe obesity due to excess calories with serious comorbidity and body mass index (BMI) of 45.0 to 49.9 in adult    Anxiety disorder, unspecified    Claustrophobia    Dependence on wheelchair    Depression, unspecified    Long term (current) use of anticoagulants    Long term (current) use of oral hypoglycemic drugs    Wound of foot    Ulcer of right foot    Orthostatic hypotension    Other chronic osteomyelitis, right ankle and foot    Personal history of nicotine dependence    Thrombocytopenia, unspecified    Unspecified open wound, right foot, initial encounter    Diabetic foot infection    Subacute osteomyelitis of right foot    Right foot pain    Sepsis    Onychomycosis    Foot pain, left    Impaired mobility and ADLs    Absence of toe of right foot    Corns and callosity    Disability of walking    Fracture    Limb swelling    Polyneuropathy    Pressure ulcer, stage 1    Shortness of breath    Generalized weakness    Debility    Ulcerated, foot,  left, limited to breakdown of skin    Onychomycosis     Past Medical History:   Diagnosis Date    Absence of toe of right foot     Acute osteomyelitis of left calcaneus  08/18/2021    Anxiety and depression     Arthritis     Cancer     Chronic pain     STATES HIS PAIN IS 10/10 AAT    Claustrophobia     Corns and callus     Diabetic ulcer of left heel associated with type 2 DM 08/18/2021    Diabetic ulcer of left heel associated with type 2 DM 07/06/2021    Diabetic ulcer of right midfoot associated with type 2 DM 08/18/2021    Difficulty walking     Essential hypertension 08/31/2021    Hammertoe     Hyperlipidemia LDL goal <100 08/31/2021    Ingrown toenail     Obesity     Paroxysmal atrial fibrillation 08/31/2021    Polyneuropathy     Pressure ulcer, stage 1     Tinea unguium     Type 2 diabetes mellitus with polyneuropathy      Past Surgical History:   Procedure Laterality Date    CYST REMOVAL      center of back; benign    EYE SURGERY      INCISION AND DRAINAGE ABSCESS      back    INCISION AND DRAINAGE LEG Right 12/10/2021    Procedure: INCISION AND DRAINAGE LOWER EXTREMITY;  Surgeon: Ash Leyva DPM;  Location: [facility];  Service: Podiatry;  Laterality: Right;    OTHER SURGICAL HISTORY      Surgical clips left foot    TOE SURGERY Right     Removal of 5th toe    TRANS METATARSAL AMPUTATION Right 12/02/2021    Procedure: AMPUTATION TRANS METATARSAL;  Surgeon: Ash Leyva DPM;  Location: [facility];  Service: Podiatry;  Laterality: Right;    VASCULAR SURGERY      WRIST SURGERY Left     repair of injury       PT Assessment (Last 12 Hours)       PT Evaluation and Treatment       Row Name 05/11/24 1300          Physical Therapy Time and Intention    Subjective Information complains of;pain  -CS     Document Type therapy note (daily note)  -CS     Mode of Treatment individual therapy;physical therapy  -CS     Patient Effort good  -CS     Symptoms Noted During/After Treatment fatigue   -       Row Name 05/11/24 1300          Pain    Pretreatment Pain Rating 8/10  -CS     Posttreatment Pain Rating 8/10  -CS     Pain Location - Side/Orientation Left  -CS     Pain Location - hip  -CS     Pain Intervention(s) Repositioned;Rest  -       Row Name 05/11/24 1300          Bed Mobility    Supine-Sit [place] (Bed Mobility) minimum assist (75% patient effort);1 person assist  -     Sit-Supine [place] (Bed Mobility) minimum assist (75% patient effort);1 person assist  -CS     Bed Mobility, Safety Issues decreased use of legs for bridging/pushing  -     Assistive Device (Bed Mobility) bed rails;overhead trapeze  -       Row Name 05/11/24 1300          Motor Skills    Therapeutic Exercise core strength  -       Row Name 05/11/24 1300          Hip (Therapeutic Exercise)    Hip AROM (Therapeutic Exercise) bilateral;aBduction;aDduction;external rotation;internal rotation;sitting;30 repititions  -     Hip AAROM (Therapeutic Exercise) bilateral;flexion;extension;sitting;10 repetitions;3 sets  -       Row Name 05/11/24 1300          Knee (Therapeutic Exercise)    Knee AROM (Therapeutic Exercise) bilateral;LAQ (long arc quad);sitting;30 repititions  -       Row Name 05/11/24 1300          Ankle (Therapeutic Exercise)    Ankle AROM (Therapeutic Exercise) bilateral;dorsiflexion;plantarflexion;sitting;30 repititions  -       Row Name 05/11/24 1300          Core Strength (Therapeutic Exercise)    Core Strength (Therapeutic Exercise) sitting;10 repetitions;3 sets  lateral trunk flexion to L and to R side, trunk extenstion/flexion  -       Row Name             Wound 03/27/24 1045 Right anterior Skin Tear    Wound - Properties Group Placement Date: 03/27/24  -CR Placement Time: 1045  -CR Side: Right  -CR Orientation: anterior  -CR Primary Wound Type: Skin tear  -CR    Retired Wound - Properties Group Placement Date: 03/27/24  -CR Placement Time: 1045  -CR Side: Right  -CR Orientation: anterior   -CR Primary Wound Type: Skin tear  -CR    Retired Wound - Properties Group Date first assessed: 03/27/24  -CR Time first assessed: 1045  -CR Side: Right  -CR Primary Wound Type: Skin tear  -CR      Row Name             Wound 04/03/24 1730 Right anterior hip MASD (Moisture associated skin damage)    Wound - Properties Group Placement Date: 04/03/24  -LA Placement Time: 1730  -LA Side: Right  -LA Orientation: anterior  -LA Location: hip  -LA Primary Wound Type: MASD  -LA    Retired Wound - Properties Group Placement Date: 04/03/24  -LA Placement Time: 1730  -LA Side: Right  -LA Orientation: anterior  -LA Location: hip  -LA Primary Wound Type: MASD  -LA    Retired Wound - Properties Group Date first assessed: 04/03/24  -LA Time first assessed: 1730  -LA Side: Right  -LA Location: hip  -LA Primary Wound Type: MASD  -LA      Row Name             Wound 05/02/24 1033 Left posterior plantar    Wound - Properties Group Placement Date: 05/02/24  -KR Placement Time: 1033  -KR Side: Left  -KR Orientation: posterior  -KR Location: plantar  -KR    Retired Wound - Properties Group Placement Date: 05/02/24  -KR Placement Time: 1033  -KR Side: Left  -KR Orientation: posterior  -KR Location: plantar  -KR    Retired Wound - Properties Group Date first assessed: 05/02/24  -KR Time first assessed: 1033  -KR Side: Left  -KR Location: plantar  -KR      Row Name             Wound 05/09/24 2010 Right lateral abdomen Abrasion    Wound - Properties Group Placement Date: 05/09/24  -TM Placement Time: 2010 -TM Side: Right  -TM Orientation: lateral  -TM Location: abdomen  -TM Primary Wound Type: Abrasion  -TM    Retired Wound - Properties Group Placement Date: 05/09/24  -TM Placement Time: 2010  -TM Side: Right  -TM Orientation: lateral  -TM Location: abdomen  -TM Primary Wound Type: Abrasion  -TM    Retired Wound - Properties Group Date first assessed: 05/09/24  -TM Time first assessed: 2010 -TM Side: Right  -TM Location: abdomen  -TM Primary  Wound Type: Abrasion  -TM      Row Name 05/11/24 1300          Positioning and Restraints    Pre-Treatment Position in bed  -CS     Post Treatment Position bed  -CS     In Bed fowlers;call light within reach;encouraged to call for assist;[place] elevated  -CS       Row Name 05/11/24 1300          Progress Summary (PT)    Progress Toward Functional Goals (PT) progress toward functional goals is gradual  -CS               User Key  (r) = Recorded By, (t) = Taken By, (c) = Cosigned By      Initials Name Provider Type    TM Rylee Orellana, RN Registered Nurse    Nicole Elise, RN Registered Nurse    Adislon Carmona LPN Licensed Nurse    Ronald Huerta PTA Physical Therapist Assistant    Almaz Morales RN Registered Nurse                  Section G              Section GG                       Physical Therapy Education       Title: PT OT SLP Therapies (Done)       Topic: Physical Therapy (Done)       Point: Mobility training (Done)       Learning Progress Summary             Patient Acceptance, E,D, DU by PG at 4/24/2024 1039    Acceptance, E,TB, VU by RM at 2/14/2024 0518    Acceptance, E,TB, VU by RM at 1/9/2024 0420    Acceptance, E, VU by CR at 12/30/2023 1750    Acceptance, E, VU by CR at 12/20/2023 1345    Acceptance, E, VU by CR at 12/19/2023 1004    Acceptance, E,TB, VU by RM at 11/30/2023 0420    Acceptance, E,TB, VU by MOE at 11/8/2023 1341                         Point: Precautions (Done)       Learning Progress Summary             Patient Acceptance, E,D, DU by PG at 4/24/2024 1039    Acceptance, E,TB, VU by RM at 2/14/2024 0518    Acceptance, E,TB, VU by RM at 1/9/2024 0420    Acceptance, E, VU by CR at 12/30/2023 1750    Acceptance, E,TB, VU by RM at 11/30/2023 0420    Acceptance, E,TB, VU by MOE at 11/8/2023 1341                                         User Key       Initials Effective Dates Name Provider Type Discipline     06/08/21 -  Bharath Berg, RN Registered Nurse Nurse    ROLY  06/13/22 -  Adilson Baker LPN Licensed Nurse Nurse    PG 06/16/21 -  Kodak Matos OT Occupational Therapist OT    MOE 06/03/21 -  Merlin Rao PT Physical Therapist PT                  PT Recommendation and Plan     Progress Summary (PT)  Progress Toward Functional Goals (PT): progress toward functional goals is gradual  Daily Progress Summary (PT): Decreased assistance for ther-ex's today in comparison to treatment I performed with pt. last weekend.  Pt. still requires assistance but overall, assistance level decreased with range of motion in bilateral lower extremeties.   Outcome Measures       Row Name 05/11/24 1300 05/10/24 1304 05/09/24 1045       How much help from another person do you currently need...    Turning from your back to your side while in flat bed without using bedrails? 3  -CS 3  -WM 3  -WM    Moving from lying on back to sitting on the side of a flat bed without bedrails? 3  -CS 3  -WM 3  -WM    Moving to and from a bed to a chair (including a wheelchair)? 2  -CS 2  -WM 2  -WM    Standing up from a chair using your arms (e.g., wheelchair, bedside chair)? 2  -CS 2  -WM 2  -WM    Climbing 3-5 steps with a railing? 1  -CS 1  -WM 1  -WM    To walk in hospital room? 1  -CS 1  -WM 1  -WM    AM-PAC 6 Clicks Score (PT) 12  -CS 12  -WM 12  -WM    Highest Level of Mobility Goal 4 --> Transfer to chair/commode  -CS 4 --> Transfer to chair/commode  -WM 4 --> Transfer to chair/commode  -WM       Functional Assessment    Outcome Measure Options AM-PAC 6 Clicks Basic Mobility (PT)  -CS -- --              User Key  (r) = Recorded By, (t) = Taken By, (c) = Cosigned By      Initials Name Provider Type    WM Carson Inman PTA Physical Therapist Assistant    Ronald Huerta PTA Physical Therapist Assistant                     Time Calculation:    PT Charges       Row Name 05/11/24 1323             Time Calculation    Start Time 1025  -CS      PT Received On 05/11/24  -         Timed Charges     52032 - PT Therapeutic Exercise Minutes 30  -CS      52374 - PT Therapeutic Activity Minutes 13  -CS         SNF Physical Therapy Minutes    Skilled Minutes- PT 43 min  -CS         Total Minutes    Timed Charges Total Minutes 43  -CS       Total Minutes 43  -CS                User Key  (r) = Recorded By, (t) = Taken By, (c) = Cosigned By      Initials Name Provider Type    CS Ronald Fabian PTA Physical Therapist Assistant                  Therapy Charges for Today       Code Description Service Date Service Provider Modifiers Qty    [id number]  PT THER PROC EA 15 MIN 5/11/2024 Ronald Fabian PTA GP 2    [id number]  PT THERAPEUTIC ACT EA 15 MIN 5/11/2024 Ronald Fabian PTA GP 1            PT G-Codes  Outcome Measure Options: AM-PAC 6 Clicks Basic Mobility (PT)  AM-PAC 6 Clicks Score (PT): 12  AM-PAC 6 Clicks Score (OT): 16    Ronald Fabian PTA  5/11/2024

## 2024-05-12 LAB
GLUCOSE BLDC GLUCOMTR-MCNC: 103 MG/DL (ref 70–99)
GLUCOSE BLDC GLUCOMTR-MCNC: 111 MG/DL (ref 70–99)
GLUCOSE BLDC GLUCOMTR-MCNC: 116 MG/DL (ref 70–99)
GLUCOSE BLDC GLUCOMTR-MCNC: 127 MG/DL (ref 70–99)

## 2024-05-12 PROCEDURE — 82948 REAGENT STRIP/BLOOD GLUCOSE: CPT | Performed by: INTERNAL MEDICINE

## 2024-05-12 PROCEDURE — 82948 REAGENT STRIP/BLOOD GLUCOSE: CPT

## 2024-05-12 PROCEDURE — 63710000001 INSULIN DETEMIR PER 5 UNITS: Performed by: PHYSICIAN ASSISTANT

## 2024-05-12 PROCEDURE — 99308 SBSQ NF CARE LOW MDM 20: CPT | Performed by: INTERNAL MEDICINE

## 2024-05-12 RX ADMIN — IBUPROFEN 400 MG: 400 TABLET ORAL at 10:57

## 2024-05-12 RX ADMIN — Medication 500 MG: at 21:14

## 2024-05-12 RX ADMIN — SERTRALINE HYDROCHLORIDE 50 MG: 50 TABLET ORAL at 21:14

## 2024-05-12 RX ADMIN — DICLOFENAC SODIUM 4 G: 10 GEL TOPICAL at 12:06

## 2024-05-12 RX ADMIN — OXYCODONE HYDROCHLORIDE AND ACETAMINOPHEN 1 TABLET: 10; 325 TABLET ORAL at 21:13

## 2024-05-12 RX ADMIN — PREGABALIN 100 MG: 100 CAPSULE ORAL at 21:14

## 2024-05-12 RX ADMIN — BACLOFEN 10 MG: 10 TABLET ORAL at 21:13

## 2024-05-12 RX ADMIN — DICLOFENAC SODIUM 4 G: 10 GEL TOPICAL at 17:00

## 2024-05-12 RX ADMIN — ATORVASTATIN CALCIUM 10 MG: 10 TABLET, FILM COATED ORAL at 21:13

## 2024-05-12 RX ADMIN — APIXABAN 5 MG: 5 TABLET, FILM COATED ORAL at 08:38

## 2024-05-12 RX ADMIN — EMPAGLIFLOZIN 10 MG: 10 TABLET, FILM COATED ORAL at 08:37

## 2024-05-12 RX ADMIN — APIXABAN 5 MG: 5 TABLET, FILM COATED ORAL at 21:14

## 2024-05-12 RX ADMIN — Medication 500 MG: at 08:37

## 2024-05-12 RX ADMIN — DICLOFENAC SODIUM 4 G: 10 GEL TOPICAL at 21:15

## 2024-05-12 RX ADMIN — PREGABALIN 100 MG: 100 CAPSULE ORAL at 08:37

## 2024-05-12 RX ADMIN — FERROUS SULFATE TAB 325 MG (65 MG ELEMENTAL FE) 325 MG: 325 (65 FE) TAB at 08:38

## 2024-05-12 RX ADMIN — BACLOFEN 10 MG: 10 TABLET ORAL at 12:05

## 2024-05-12 RX ADMIN — DICLOFENAC SODIUM 4 G: 10 GEL TOPICAL at 08:39

## 2024-05-12 RX ADMIN — INSULIN DETEMIR 40 UNITS: 100 INJECTION, SOLUTION SUBCUTANEOUS at 08:36

## 2024-05-12 RX ADMIN — OXYCODONE HYDROCHLORIDE AND ACETAMINOPHEN 1 TABLET: 10; 325 TABLET ORAL at 12:05

## 2024-05-12 RX ADMIN — CYANOCOBALAMIN TAB 500 MCG 1000 MCG: 500 TAB at 08:38

## 2024-05-12 RX ADMIN — PREGABALIN 100 MG: 100 CAPSULE ORAL at 17:00

## 2024-05-12 RX ADMIN — Medication 10 MG: at 21:13

## 2024-05-12 RX ADMIN — CETIRIZINE HYDROCHLORIDE 10 MG: 10 TABLET, FILM COATED ORAL at 08:38

## 2024-05-12 RX ADMIN — BACLOFEN 10 MG: 10 TABLET ORAL at 04:03

## 2024-05-12 RX ADMIN — INSULIN DETEMIR 40 UNITS: 100 INJECTION, SOLUTION SUBCUTANEOUS at 21:12

## 2024-05-12 RX ADMIN — OXYCODONE HYDROCHLORIDE AND ACETAMINOPHEN 1 TABLET: 10; 325 TABLET ORAL at 04:04

## 2024-05-12 NOTE — PLAN OF CARE
Goal Outcome Evaluation:  Plan of Care Reviewed With: patient        Progress: no change  Outcome Evaluation: Alert and oriented. Vital signs stable. No significant change. Continues with percocet and baclofen for pain management to left shoulder and hip. Uses Trapeze to reposition self in bed. Continue plan of care.

## 2024-05-12 NOTE — PLAN OF CARE
Goal Outcome Evaluation:  Plan of Care Reviewed With: patient        Progress: improving  Outcome Evaluation: Patient alert and oriented. Able to make needs known. Percocet and baclofen x1 this shift for pain as well as PRN motrin for pain to shoulder and hip. Dressings and skin care completed as ordered. Lisinopril and Lasix held for BP of 93/54. Continue plan of care.

## 2024-05-12 NOTE — PROGRESS NOTES
[facility]   Hospitalist Progress Note  Date: 2024  Patient Name: Jose Shaikh  : 1960  MRN: [id number]  Date of admission: 2023      Subjective   Subjective     Chief Complaint: Weakness    Summary: Jose Shaikh is a 63 y.o. male  initially hospitalized on 9/10/2023, prolonged hospitalization for treatment of management of generalized weakness, deconditioning, with acute issues of diarrhea, C. difficile negative.  Diarrhea improved.  Had small hematoma after ambulating on his foot.  Unfortunately with exhaustive efforts to try to place this gentleman after 39 days of hospitalization, we are unable to find a facility that will accept him.  He has no further acute needs or requirements for inpatient monitoring and management, his labs and vitals are stable, he is tolerating oral intake, and has been refusing turns and repositionings and other interventions with nursing staff despite recommendations.  Patient discharged in hemodynamically stable condition on 10/19/2023 to follow-up with PCP within 1 week. Unfortunately we cannot solve all of his social issues during this hospitalization due to lack of resources and participation from the patient's perspective despite all our efforts.  Patient has a financial means of making arrangements at home.  Patient appealed his discharge.  Lost his appeal.  LCD: 10/20/23, PFL beginning: 10/21/23 @ 12pm.  Due to an inability to place the patient in a.m. nursing home he has been placed in our skilled nursing facility until arrangements can be made or until he is able to care for himself.      Interval Followup: 2024    Denies complaints  Feels like he is tolerating Ozempic, wants to keep going      Review of systems: All systems reviewed and negative except what is noted above in interval follow-up   Objective   Objective     Vitals:   Temp:  [97.5 °F (36.4 °C)-97.6 °F (36.4 °C)] 97.5 °F (36.4 °C)  Heart Rate:  [69-74] 69  Resp:  [18] 18  BP:  ()/(54-56) 93/54    Physical Exam   Constitutional: No distress.  Alert and conversational.  Respiratory: No wheeze noted.  GI: Abdomen: Obese  Neuro/psych:  Calm mood.  No dysarthria.  Extremities: Some lower extremity edema noted    Result Review    Result Review:  I have reviewed the following:  [x]  Laboratory  CBC          4/2/2024    04:50 4/8/2024    05:06 5/6/2024    05:05   CBC   WBC 4.00  4.20  4.06    RBC 4.16  4.08  4.30    Hemoglobin 11.7  11.5  11.9    Hematocrit 36.5  36.7  37.8    MCV 87.7  90.0  87.9    MCH 28.1  28.2  27.7    MCHC 32.1  31.3  31.5    RDW 15.5  15.4  15.3    Platelets 82  87  92      CMP          4/24/2024    04:24 4/29/2024    10:50 5/6/2024    05:05   CMP   Glucose 107  150  83    BUN 18  15  15    Creatinine 0.43  0.68  0.46    EGFR 118.5  103.2  116.1    Sodium 139  137  137    Potassium 4.0  4.2  4.2    Chloride 105  102  106    Calcium 8.3  8.3  8.3    BUN/Creatinine Ratio 41.9  22.1  32.6    Anion Gap 8.9  10.4  7.1        [x]  Microbiology  []  Radiology  []  EKG/Telemetry   []  Cardiology/Vascular   []  Pathology  []  Old records  []  Other:    Assessment & Plan   Assessment / Plan   Assessment:  Hospital-acquired weakness  Hx of Influenza A  Hx of C. difficile diarrhea treated  Generalized weakness  Deconditioning  DM (Kami Garcia and PCP)  HTN  PAF (on Eliquis; previously seen Dr. Duval)  Morbid obesity with BMI 44  Debility with wheelchair dependence  Hx of severe left hip pain  Severe degenerative joint disease of lower extremities  Hx L calcaneus osteomyelitis  Chronic venous stasis  Hx R transmetatarsal  Chronic bilateral foot wounds with right transmetatarsal amputation, healing by secondary intention (wound care clinic; podiatrist [facility])  Thrombocytopenia, resolved      Plan:    Continue current care  Lasix held 5/12 d/t bp 90s  Tolerating Ozempic  PT/OT    DVT prophylaxis:  Medical DVT prophylaxis orders are present.    CODE STATUS:   Code Status (Patient  has no pulse and is not breathing): CPR (Attempt to Resuscitate)  Medical Interventions (Patient has pulse or is breathing): Full Support

## 2024-05-13 LAB
GLUCOSE BLDC GLUCOMTR-MCNC: 106 MG/DL (ref 70–99)
GLUCOSE BLDC GLUCOMTR-MCNC: 108 MG/DL (ref 70–99)
GLUCOSE BLDC GLUCOMTR-MCNC: 160 MG/DL (ref 70–99)
GLUCOSE BLDC GLUCOMTR-MCNC: 92 MG/DL (ref 70–99)

## 2024-05-13 PROCEDURE — 63710000001 INSULIN DETEMIR PER 5 UNITS: Performed by: PHYSICIAN ASSISTANT

## 2024-05-13 PROCEDURE — 97530 THERAPEUTIC ACTIVITIES: CPT

## 2024-05-13 PROCEDURE — 97164 PT RE-EVAL EST PLAN CARE: CPT

## 2024-05-13 PROCEDURE — 63710000001 INSULIN LISPRO (HUMAN) PER 5 UNITS: Performed by: INTERNAL MEDICINE

## 2024-05-13 PROCEDURE — 82948 REAGENT STRIP/BLOOD GLUCOSE: CPT | Performed by: INTERNAL MEDICINE

## 2024-05-13 PROCEDURE — 82948 REAGENT STRIP/BLOOD GLUCOSE: CPT

## 2024-05-13 PROCEDURE — 97165 OT EVAL LOW COMPLEX 30 MIN: CPT

## 2024-05-13 RX ADMIN — DICLOFENAC SODIUM 4 G: 10 GEL TOPICAL at 11:43

## 2024-05-13 RX ADMIN — EMPAGLIFLOZIN 10 MG: 10 TABLET, FILM COATED ORAL at 08:37

## 2024-05-13 RX ADMIN — DICLOFENAC SODIUM 4 G: 10 GEL TOPICAL at 08:37

## 2024-05-13 RX ADMIN — CYANOCOBALAMIN TAB 500 MCG 1000 MCG: 500 TAB at 08:37

## 2024-05-13 RX ADMIN — Medication 500 MG: at 08:37

## 2024-05-13 RX ADMIN — PREGABALIN 100 MG: 100 CAPSULE ORAL at 17:15

## 2024-05-13 RX ADMIN — LISINOPRIL 2.5 MG: 2.5 TABLET ORAL at 08:38

## 2024-05-13 RX ADMIN — PREGABALIN 100 MG: 100 CAPSULE ORAL at 08:38

## 2024-05-13 RX ADMIN — DICLOFENAC SODIUM 4 G: 10 GEL TOPICAL at 17:13

## 2024-05-13 RX ADMIN — BACLOFEN 10 MG: 10 TABLET ORAL at 23:12

## 2024-05-13 RX ADMIN — SERTRALINE HYDROCHLORIDE 50 MG: 50 TABLET ORAL at 20:56

## 2024-05-13 RX ADMIN — Medication 10 MG: at 20:56

## 2024-05-13 RX ADMIN — OXYCODONE HYDROCHLORIDE AND ACETAMINOPHEN 1 TABLET: 10; 325 TABLET ORAL at 23:11

## 2024-05-13 RX ADMIN — APIXABAN 5 MG: 5 TABLET, FILM COATED ORAL at 20:56

## 2024-05-13 RX ADMIN — Medication 500 MG: at 20:56

## 2024-05-13 RX ADMIN — INSULIN DETEMIR 40 UNITS: 100 INJECTION, SOLUTION SUBCUTANEOUS at 08:37

## 2024-05-13 RX ADMIN — INSULIN LISPRO 4 UNITS: 100 INJECTION, SOLUTION INTRAVENOUS; SUBCUTANEOUS at 20:57

## 2024-05-13 RX ADMIN — ATORVASTATIN CALCIUM 10 MG: 10 TABLET, FILM COATED ORAL at 20:56

## 2024-05-13 RX ADMIN — FUROSEMIDE 40 MG: 40 TABLET ORAL at 08:38

## 2024-05-13 RX ADMIN — OXYCODONE HYDROCHLORIDE AND ACETAMINOPHEN 1 TABLET: 10; 325 TABLET ORAL at 14:49

## 2024-05-13 RX ADMIN — DICLOFENAC SODIUM 4 G: 10 GEL TOPICAL at 20:57

## 2024-05-13 RX ADMIN — APIXABAN 5 MG: 5 TABLET, FILM COATED ORAL at 08:38

## 2024-05-13 RX ADMIN — OXYCODONE HYDROCHLORIDE AND ACETAMINOPHEN 1 TABLET: 10; 325 TABLET ORAL at 05:43

## 2024-05-13 RX ADMIN — BACLOFEN 10 MG: 10 TABLET ORAL at 14:49

## 2024-05-13 RX ADMIN — IBUPROFEN 400 MG: 400 TABLET ORAL at 03:49

## 2024-05-13 RX ADMIN — CETIRIZINE HYDROCHLORIDE 10 MG: 10 TABLET, FILM COATED ORAL at 08:38

## 2024-05-13 RX ADMIN — PREGABALIN 100 MG: 100 CAPSULE ORAL at 20:56

## 2024-05-13 RX ADMIN — INSULIN DETEMIR 40 UNITS: 100 INJECTION, SOLUTION SUBCUTANEOUS at 20:57

## 2024-05-13 RX ADMIN — FERROUS SULFATE TAB 325 MG (65 MG ELEMENTAL FE) 325 MG: 325 (65 FE) TAB at 08:38

## 2024-05-13 RX ADMIN — BACLOFEN 10 MG: 10 TABLET ORAL at 05:43

## 2024-05-13 NOTE — THERAPY TREATMENT NOTE
SNF - Physical Therapy Treatment Note  KE[facility] Dominguez     Patient Name: Jose Shaikh  : 1960  MRN: [id number]  Today's Date: 2024      Visit Dx:    ICD-10-CM ICD-9-CM   1. Difficulty in walking  R26.2 719.7   2. Type 2 diabetes mellitus with other specified complication, unspecified whether long term insulin use  E11.69 250.80     Patient Active Problem List   Diagnosis    Diabetic ulcer of left heel associated with type 2 DM    Acute osteomyelitis of left calcaneus     Diabetic ulcer of left heel associated with type 2 DM    Diabetic ulcer of right midfoot associated with type 2 DM    Paroxysmal atrial fibrillation    Essential hypertension    Hyperlipidemia LDL goal <100    Cellulitis and abscess of foot    High alkaline phosphatase level    Osteomyelitis    Onychomycosis    Onychocryptosis    Foot pain, bilateral    Osteomyelitis of foot, right, acute    Cellulitis of right foot    Type 2 diabetes mellitus, with long-term current use of insulin    Class 3 severe obesity due to excess calories with serious comorbidity and body mass index (BMI) of 45.0 to 49.9 in adult    Anxiety disorder, unspecified    Claustrophobia    Dependence on wheelchair    Depression, unspecified    Long term (current) use of anticoagulants    Long term (current) use of oral hypoglycemic drugs    Wound of foot    Ulcer of right foot    Orthostatic hypotension    Other chronic osteomyelitis, right ankle and foot    Personal history of nicotine dependence    Thrombocytopenia, unspecified    Unspecified open wound, right foot, initial encounter    Diabetic foot infection    Subacute osteomyelitis of right foot    Right foot pain    Sepsis    Onychomycosis    Foot pain, left    Impaired mobility and ADLs    Absence of toe of right foot    Corns and callosity    Disability of walking    Fracture    Limb swelling    Polyneuropathy    Pressure ulcer, stage 1    Shortness of breath    Generalized weakness    Debility    Ulcerated, foot,  left, limited to breakdown of skin    Onychomycosis     Past Medical History:   Diagnosis Date    Absence of toe of right foot     Acute osteomyelitis of left calcaneus  08/18/2021    Anxiety and depression     Arthritis     Cancer     Chronic pain     STATES HIS PAIN IS 10/10 AAT    Claustrophobia     Corns and callus     Diabetic ulcer of left heel associated with type 2 DM 08/18/2021    Diabetic ulcer of left heel associated with type 2 DM 07/06/2021    Diabetic ulcer of right midfoot associated with type 2 DM 08/18/2021    Difficulty walking     Essential hypertension 08/31/2021    Hammertoe     Hyperlipidemia LDL goal <100 08/31/2021    Ingrown toenail     Obesity     Paroxysmal atrial fibrillation 08/31/2021    Polyneuropathy     Pressure ulcer, stage 1     Tinea unguium     Type 2 diabetes mellitus with polyneuropathy      Past Surgical History:   Procedure Laterality Date    CYST REMOVAL      center of back; benign    EYE SURGERY      INCISION AND DRAINAGE ABSCESS      back    INCISION AND DRAINAGE LEG Right 12/10/2021    Procedure: INCISION AND DRAINAGE LOWER EXTREMITY;  Surgeon: Ash Leyva DPM;  Location: [facility];  Service: Podiatry;  Laterality: Right;    OTHER SURGICAL HISTORY      Surgical clips left foot    TOE SURGERY Right     Removal of 5th toe    TRANS METATARSAL AMPUTATION Right 12/02/2021    Procedure: AMPUTATION TRANS METATARSAL;  Surgeon: Ash Leyva DPM;  Location: [facility];  Service: Podiatry;  Laterality: Right;    VASCULAR SURGERY      WRIST SURGERY Left     repair of injury       PT Assessment (Last 12 Hours)       PT Evaluation and Treatment       Row Name 05/13/24 1427          Physical Therapy Time and Intention    Subjective Information complains of;pain  -WM     Document Type therapy note (daily note)  -WM     Mode of Treatment individual therapy;physical therapy  -WM     Patient Effort adequate  -WM     Symptoms Noted During/After Treatment  fatigue  -WM       Row Name 05/13/24 1428          Pain    Pretreatment Pain Rating 10/10  -WM     Posttreatment Pain Rating 10/10  -WM     Pain Location - Side/Orientation Left  -WM     Pain Location - hip  -WM       Row Name 05/13/24 1428          Bed Mobility    Supine-Sit-Supine [place] (Bed Mobility) minimum assist (75% patient effort);1 person assist  -WM     Bed Mobility, Safety Issues decreased use of legs for bridging/pushing  -WM     Assistive Device (Bed Mobility) bed rails;draw sheet;head of bed elevated  -WM       Row Name 05/13/24 1428          Sit-Stand Transfer    Sit-Stand [place] (Transfers) moderate assist (50% patient effort);2 person assist;verbal cues  -     Assistive Device (Sit-Stand Transfers) bariatric;walker, front-wheeled  -WM       Row Name 05/13/24 1428          Stand-Sit Transfer    Stand-Sit [place] (Transfers) moderate assist (50% patient effort);2 person assist;verbal cues  -WM     Assistive Device (Stand-Sit Transfers) bariatric;walker, front-wheeled  -WM       Row Name 05/13/24 1428          Safety Issues, Functional Mobility    Impairments Affecting Function (Mobility) balance;endurance/activity tolerance;pain;strength  -WM       Row Name             Wound 03/27/24 1045 Right anterior Skin Tear    Wound - Properties Group Placement Date: 03/27/24  -CR Placement Time: 1045  -CR Side: Right  -CR Orientation: anterior  -CR Primary Wound Type: Skin tear  -CR    Retired Wound - Properties Group Placement Date: 03/27/24  -CR Placement Time: 1045  -CR Side: Right  -CR Orientation: anterior  -CR Primary Wound Type: Skin tear  -CR    Retired Wound - Properties Group Date first assessed: 03/27/24  -CR Time first assessed: 1045  -CR Side: Right  -CR Primary Wound Type: Skin tear  -CR      Row Name             Wound 04/03/24 1730 Right anterior hip MASD (Moisture associated skin damage)    Wound - Properties Group Placement Date: 04/03/24  -HI Placement Time: 1730  -HI Side:  Right  -KS Orientation: anterior  -KS Location: hip  -KS Primary Wound Type: MASD  -KS    Retired Wound - Properties Group Placement Date: 04/03/24  -KS Placement Time: 1730  -KS Side: Right  -KS Orientation: anterior  -KS Location: hip  -KS Primary Wound Type: MASD  -KS    Retired Wound - Properties Group Date first assessed: 04/03/24  -KS Time first assessed: 1730  -KS Side: Right  -KS Location: hip  -KS Primary Wound Type: MASD  -KS      Row Name             Wound 05/02/24 1033 Left posterior plantar    Wound - Properties Group Placement Date: 05/02/24  -KR Placement Time: 1033  -KR Side: Left  -KR Orientation: posterior  -KR Location: plantar  -KR    Retired Wound - Properties Group Placement Date: 05/02/24  -KR Placement Time: 1033  -KR Side: Left  -KR Orientation: posterior  -KR Location: plantar  -KR    Retired Wound - Properties Group Date first assessed: 05/02/24  -KR Time first assessed: 1033  -KR Side: Left  -KR Location: plantar  -KR      Row Name             Wound 05/09/24 2010 Right lateral abdomen Abrasion    Wound - Properties Group Placement Date: 05/09/24  -TM Placement Time: 2010 -TM Side: Right  -TM Orientation: lateral  -TM Location: abdomen  -TM Primary Wound Type: Abrasion  -TM    Retired Wound - Properties Group Placement Date: 05/09/24  -TM Placement Time: 2010 -TM Side: Right  -TM Orientation: lateral  -TM Location: abdomen  -TM Primary Wound Type: Abrasion  -TM    Retired Wound - Properties Group Date first assessed: 05/09/24  -TM Time first assessed: 2010  -TM Side: Right  -TM Location: abdomen  -TM Primary Wound Type: Abrasion  -TM      Row Name 05/13/24 1428          Positioning and Restraints    Pre-Treatment Position in bed  -WM     Post Treatment Position bed  -WM     In Bed fowlers;call light within reach;encouraged to call for assist  -WM       Row Name 05/13/24 1428          Progress Summary (PT)    Progress Toward Functional Goals (PT) progress toward functional goals is  gradual  -WM     Daily Progress Summary (PT) Pt stood 3 times with modA x 2 but was unable to take steps, due to left hip pain.  Pt also declined leg exercises. Will continue with POC  -WM               User Key  (r) = Recorded By, (t) = Taken By, (c) = Cosigned By      Initials Name Provider Type    TM Rylee Orellana, RN Registered Nurse    Nicole Elise RN Registered Nurse    Adilson Carmona LPN Licensed Nurse    Carson Johnson PTA Physical Therapist Assistant    Almaz Morales RN Registered Nurse                  Section G              Section GG                       Physical Therapy Education       Title: PT OT SLP Therapies (Done)       Topic: Physical Therapy (Done)       Point: Mobility training (Done)       Learning Progress Summary             Patient Acceptance, E,D, DU by PG at 4/24/2024 1039    Acceptance, E,TB, VU by RM at 2/14/2024 0518    Acceptance, E,TB, VU by RM at 1/9/2024 0420    Acceptance, E, VU by CR at 12/30/2023 1750    Acceptance, E, VU by CR at 12/20/2023 1345    Acceptance, E, VU by CR at 12/19/2023 1004    Acceptance, E,TB, VU by RM at 11/30/2023 0420    Acceptance, E,TB, VU by MOE at 11/8/2023 1341                         Point: Precautions (Done)       Learning Progress Summary             Patient Acceptance, E,D, DU by PG at 4/24/2024 1039    Acceptance, E,TB, VU by RM at 2/14/2024 0518    Acceptance, E,TB, VU by RM at 1/9/2024 0420    Acceptance, E, VU by CR at 12/30/2023 1750    Acceptance, E,TB, VU by RM at 11/30/2023 0420    Acceptance, E,TB, VU by MOE at 11/8/2023 1341                                         User Key       Initials Effective Dates Name Provider Type Discipline     06/08/21 -  Bharath Berg RN Registered Nurse Nurse    ROLY 06/13/22 -  Adilson Baker LPN Licensed Nurse Nurse    PG 06/16/21 -  Kodak Matos OT Occupational Therapist OT    MOE 06/03/21 -  Merlin Rao PT Physical Therapist PT                  PT Recommendation and Plan      Progress Summary (PT)  Progress Toward Functional Goals (PT): progress toward functional goals is gradual  Daily Progress Summary (PT): Pt stood 3 times with modA x 2 but was unable to take steps, due to left hip pain.  Pt also declined leg exercises. Will continue with POC   Outcome Measures       Row Name 05/13/24 1438 05/11/24 1300          How much help from another person do you currently need...    Turning from your back to your side while in flat bed without using bedrails? 3  -WM 3  -CS     Moving from lying on back to sitting on the side of a flat bed without bedrails? 2  -WM 3  -CS     Moving to and from a bed to a chair (including a wheelchair)? 2  -WM 2  -CS     Standing up from a chair using your arms (e.g., wheelchair, bedside chair)? 2  -WM 2  -CS     Climbing 3-5 steps with a railing? 1  -WM 1  -CS     To walk in hospital room? 1  -WM 1  -CS     AM-PAC 6 Clicks Score (PT) 11  -WM 12  -CS     Highest Level of Mobility Goal 4 --> Transfer to chair/commode  -WM 4 --> Transfer to chair/commode  -CS        Functional Assessment    Outcome Measure Options -- AM-PAC 6 Clicks Basic Mobility (PT)  -CS               User Key  (r) = Recorded By, (t) = Taken By, (c) = Cosigned By      Initials Name Provider Type    WM Carson Inman PTA Physical Therapist Assistant    Ronald Huerta PTA Physical Therapist Assistant                     Time Calculation:    PT Charges       Row Name 05/13/24 1434 05/13/24 1425          Time Calculation    PT Received On -- 05/13/24  -WM        Timed Charges    60161 - PT Therapeutic Activity Minutes -- 25  -WM        Untimed Charges    PT Eval/Re-eval Minutes 20  -MOE --        SNF Physical Therapy Minutes    Skilled Minutes- PT -- 25 min  -WM        Total Minutes    Timed Charges Total Minutes -- 25  -WM     Untimed Charges Total Minutes 20  -MOE --      Total Minutes 20  -MOE 25  -WM               User Key  (r) = Recorded By, (t) = Taken By, (c) = Cosigned By       Initials Name Provider Type    Carson Johnson, [place] Physical Therapist Assistant    Merlin De La O, PT Physical Therapist                      PT G-Codes  Outcome Measure Options: AM-PAC 6 Clicks Basic Mobility (PT)  AM-PAC 6 Clicks Score (PT): 11  AM-PAC 6 Clicks Score (OT): 16    Carson Inman PTA  5/13/2024

## 2024-05-13 NOTE — PLAN OF CARE
Goal Outcome Evaluation:  Plan of Care Reviewed With: patient        Progress: no change  Outcome Evaluation: Aox4, call light and personal items within reach. Able to make needs known to staffing. Urinal at bedside, bedpan used, bedside with PT. C/o left shoulder and hip pain, medication given. Postional changes refused. Wound care tolorated. Con't with plan of care.

## 2024-05-13 NOTE — PLAN OF CARE
Goal Outcome Evaluation:  Plan of Care Reviewed With: patient        Progress: no change  Outcome Evaluation: Alert and oriented X 4. Vital signs stable. Continues to use trapeze for assistance with positioning. Baclofen, Percocet, and ibuprofen administered for left shoulder and hip pain. Currently resting quietly with no s/s of distress. Continue plan of care.

## 2024-05-13 NOTE — THERAPY RE-EVALUATION
SNF - Occupational Therapy Re-Evaluation   Angelica    Patient Name: Jose Shaikh  : 1960    MRN: [id number]                              Today's Date: 2024       Admit Date: 2023    Visit Dx:     ICD-10-CM ICD-9-CM   1. Difficulty in walking  R26.2 719.7   2. Type 2 diabetes mellitus with other specified complication, unspecified whether long term insulin use  E11.69 250.80     Patient Active Problem List   Diagnosis    Diabetic ulcer of left heel associated with type 2 DM    Acute osteomyelitis of left calcaneus     Diabetic ulcer of left heel associated with type 2 DM    Diabetic ulcer of right midfoot associated with type 2 DM    Paroxysmal atrial fibrillation    Essential hypertension    Hyperlipidemia LDL goal <100    Cellulitis and abscess of foot    High alkaline phosphatase level    Osteomyelitis    Onychomycosis    Onychocryptosis    Foot pain, bilateral    Osteomyelitis of foot, right, acute    Cellulitis of right foot    Type 2 diabetes mellitus, with long-term current use of insulin    Class 3 severe obesity due to excess calories with serious comorbidity and body mass index (BMI) of 45.0 to 49.9 in adult    Anxiety disorder, unspecified    Claustrophobia    Dependence on wheelchair    Depression, unspecified    Long term (current) use of anticoagulants    Long term (current) use of oral hypoglycemic drugs    Wound of foot    Ulcer of right foot    Orthostatic hypotension    Other chronic osteomyelitis, right ankle and foot    Personal history of nicotine dependence    Thrombocytopenia, unspecified    Unspecified open wound, right foot, initial encounter    Diabetic foot infection    Subacute osteomyelitis of right foot    Right foot pain    Sepsis    Onychomycosis    Foot pain, left    Impaired mobility and ADLs    Absence of toe of right foot    Corns and callosity    Disability of walking    Fracture    Limb swelling    Polyneuropathy    Pressure ulcer, stage 1    Shortness of  breath    Generalized weakness    Debility    Ulcerated, foot, left, limited to breakdown of skin    Onychomycosis     Past Medical History:   Diagnosis Date    Absence of toe of right foot     Acute osteomyelitis of left calcaneus  08/18/2021    Anxiety and depression     Arthritis     Cancer     Chronic pain     STATES HIS PAIN IS 10/10 AAT    Claustrophobia     Corns and callus     Diabetic ulcer of left heel associated with type 2 DM 08/18/2021    Diabetic ulcer of left heel associated with type 2 DM 07/06/2021    Diabetic ulcer of right midfoot associated with type 2 DM 08/18/2021    Difficulty walking     Essential hypertension 08/31/2021    Hammertoe     Hyperlipidemia LDL goal <100 08/31/2021    Ingrown toenail     Obesity     Paroxysmal atrial fibrillation 08/31/2021    Polyneuropathy     Pressure ulcer, stage 1     Tinea unguium     Type 2 diabetes mellitus with polyneuropathy      Past Surgical History:   Procedure Laterality Date    CYST REMOVAL      center of back; benign    EYE SURGERY      INCISION AND DRAINAGE ABSCESS      back    INCISION AND DRAINAGE LEG Right 12/10/2021    Procedure: INCISION AND DRAINAGE LOWER EXTREMITY;  Surgeon: Ash Leyva DPM;  Location: formerly [facility] MAIN OR;  Service: Podiatry;  Laterality: Right;    OTHER SURGICAL HISTORY      Surgical clips left foot    TOE SURGERY Right     Removal of 5th toe    TRANS METATARSAL AMPUTATION Right 12/02/2021    Procedure: AMPUTATION TRANS METATARSAL;  Surgeon: Ash Leyva DPM;  Location: formerly [facility] MAIN OR;  Service: Podiatry;  Laterality: Right;    VASCULAR SURGERY      WRIST SURGERY Left     repair of injury      General Information       Row Name 05/13/24 1400          OT Time and Intention    Document Type progress note/recertification  -SC     Mode of Treatment individual therapy;occupational therapy  -SC       Row Name 05/13/24 1400          General Information    Patient Profile Reviewed yes  -SC     Existing  Precautions/Restrictions fall  -SC       Row Name 05/13/24 1400          Safety Issues, Functional Mobility    Impairments Affecting Function (Mobility) balance;endurance/activity tolerance;strength  -SC               User Key  (r) = Recorded By, (t) = Taken By, (c) = Cosigned By      Initials Name Provider Type    SC Halle Tejeda OT Occupational Therapist                     Mobility/ADL's       Row Name 05/13/24 1400          Bed Mobility    Bed Mobility sit-supine  -SC     All Activities, [place] (Bed Mobility) minimum assist (75% patient effort)  -SC     Scooting/Bridging [place] (Bed Mobility) minimum assist (75% patient effort)  -SC     Supine-Sit [place] (Bed Mobility) minimum assist (75% patient effort)  -SC     Sit-Supine [place] (Bed Mobility) moderate assist (50% patient effort)  -SC     Supine-Sit-Supine [place] (Bed Mobility) minimum assist (75% patient effort)  Pt requires head of bed inverted.  -SC     Assistive Device (Bed Mobility) bed rails;overhead trapeze  -SC       Row Name 05/13/24 1400          Transfers    Transfers sit-stand transfer;stand-sit transfer  -SC     Comment, (Transfers) Sit to stand x 3 reps at Mod-Max A x 2. Discussed alternative options for transfer due to pain w/ weight bearing. Patient agreeable to trial of drop arm toilet and or trial of claude for up in chair if staff can locate DME to support correct weight.  -SC       Row Name 05/13/24 1400          Sit-Stand Transfer    Sit-Stand [place] (Transfers) moderate assist (50% patient effort);2 person assist;verbal cues  -SC     Assistive Device (Sit-Stand Transfers) bariatric;walker, front-wheeled  -SC       Row Name 05/13/24 1400          Stand-Sit Transfer    Stand-Sit [place] (Transfers) moderate assist (50% patient effort);2 person assist;verbal cues  -SC     Assistive Device (Stand-Sit Transfers) bariatric;walker, front-wheeled  -SC       Row Name 05/13/24 1400          Toilet Transfer     Comment, (Toilet Transfer) Stand pivot attempted to baratric 3:1 x 2 attempts. Patient declined further attempts due to pain. He is agreeable to trial of drop arm.  -[facility] Name 05/13/24 1400          Bathing Assessment/Intervention    [place] Level (Bathing) bathing skills;upper body;set up  -SC     Position (Bathing) supported sitting  -[facility] Name 05/13/24 1400          Upper Body Dressing Assessment/Training    [place] Level (Upper Body Dressing) upper body dressing skills;set up  -[facility] Name 05/13/24 1400          Lower Body Dressing Assessment/Training    [place] Level (Lower Body Dressing) lower body dressing skills;dependent (less than 25% patient effort)  -[facility] Name 05/13/24 1400          Grooming Assessment/Training    [place] Level (Grooming) grooming skills;set up  -[facility] Name 05/13/24 1400          Toileting Assessment/Training    [place] Level (Toileting) toileting skills;dependent (less than 25% patient effort)  -SC               User Key  (r) = Recorded By, (t) = Taken By, (c) = Cosigned By      Initials Name Provider Type    Halle Hernández OT Occupational Therapist                   Obj/Interventions       [facility] Name 05/13/24 1407          Sensory Assessment (Somatosensory)    Sensory Assessment (Somatosensory) sensation intact  -[facility] Name 05/13/24 1407          Vision Assessment/Intervention    Visual Impairment/Limitations WFL  -[facility] 05/13/24 1407          Range of Motion Comprehensive    General Range of Motion no range of motion deficits identified  -SC       Row Name 05/13/24 1407          Motor Skills    Functional Endurance fair +  -SC       Row Name 05/13/24 1407          Balance    Comment, Balance static sit balance WFL; Unable to assess dynamic due to patient inability to stand this date.  -SC               User Key  (r) = Recorded By, (t) = Taken By, (c) = Cosigned By      Initials Name Provider Type     SC Halle Tejeda, OT Occupational Therapist                   Goals/Plan       Row Name 05/13/24 1500          Transfer Goal 1 (OT)    Activity/Assistive Device (Transfer Goal 1, OT) transfers, all  -SC     [place] Level/Cues Needed (Transfer Goal 1, OT) minimum assist (75% or more patient effort)  -SC     Time Frame (Transfer Goal 1, OT) long term goal (LTG);10 days  -SC     Progress/Outcome (Transfer Goal 1, OT) continuing progress toward goal  -SC       Row Name 05/13/24 1500          Bathing Goal 1 (OT)    Activity/Device (Bathing Goal 1, OT) bathing skills, all  -SC     [place] Level/Cues Needed (Bathing Goal 1, OT) supervision required  -SC     Time Frame (Bathing Goal 1, OT) long term goal (LTG);10 days  -SC     Progress/Outcomes (Bathing Goal 1, OT) continuing progress toward goal  -SC       Row Name 05/13/24 1500          Dressing Goal 1 (OT)    Activity/Device (Dressing Goal 1, OT) dressing skills, all  -SC     [place]/Cues Needed (Dressing Goal 1, OT) minimum assist (75% or more patient effort)  -SC     Time Frame (Dressing Goal 1, OT) long term goal (LTG);10 days  -SC     Progress/Outcome (Dressing Goal 1, OT) new goal  -[facility] Name 05/13/24 1500          Toileting Goal 1 (OT)    Activity/Device (Toileting Goal 1, OT) toileting skills, all  -SC     [place] Level/Cues Needed (Toileting Goal 1, OT) minimum assist (75% or more patient effort)  -SC     Time Frame (Toileting Goal 1, OT) long term goal (LTG);10 days  -SC       Row Name 05/13/24 1500          Problem Specific Goal 1 (OT)    Problem Specific Goal 1 (OT) Patient will improve activity tolerance to good to support independence with self-care activity and functional transfers  -SC     Time Frame (Problem Specific Goal 1, OT) long term goal (LTG);10 days  -SC     Progress/Outcome (Problem Specific Goal 1, OT) continuing progress toward goal  -SC       Row Name 05/13/24 1500          Therapy Assessment/Plan (OT)    Planned  Therapy Interventions (OT) activity tolerance training;functional balance retraining;patient/caregiver education/training;transfer/mobility retraining;strengthening exercise;BADL retraining  -SC               User Key  (r) = Recorded By, (t) = Taken By, (c) = Cosigned By      Initials Name Provider Type    SC Halle Tejeda OT Occupational Therapist                   Clinical Impression       Row Name 05/13/24 1408          Pain Assessment    Pretreatment Pain Rating 5/10  -SC     Posttreatment Pain Rating 5/10  -SC     Pain Location generalized  -SC     Pain Location - hip  -SC       Row Name 05/13/24 1402          Plan of Care Review    Plan of Care Reviewed With patient  -SC     Progress no change  -SC     Outcome Evaluation Patient continues to present with functional limitations of pain, LB strength, balance, and activity tolerance impacting his function for all self care and mobility. He has been seen by skilled OT since 4/24/24 min progress and fair participation in sit to stand trials, sitting at EOB , strength ex, and endurance exercise with omnicycle. Discussed concerns that patient has made min progress towards functional goals. Patient verbalizes understanding that in order to continue services, he needs to make functional progress. Patient is agreeable to continue strength/endurance exercise with specific goals to shower w/ bariatric rolling shower chair, toilelt w/ drop arm 3:1; and trial use of claude for more consistent safe mobility of getting in/out of bed with floor staff.  -SC       Row Name 05/13/24 1405          Therapy Assessment/Plan (OT)    Rehab Potential (OT) good, to achieve stated therapy goals  -SC     Criteria for Skilled Therapeutic Interventions Met (OT) yes;meets criteria;skilled treatment is necessary  -SC     Therapy Frequency (OT) 5 times/wk  -SC       Row Name 05/13/24 1404          Therapy Plan Review/Discharge Plan (OT)    Equipment Needs Upon Discharge (OT) dressing  equipment;raised toilet seat;reacher;toileting equipment;walker, rolling  -SC       Row Name 05/13/24 1408          Positioning and Restraints    Pre-Treatment Position in bed  -SC     Post Treatment Position bed  -SC     In Chair call light within reach;encouraged to call for assist  -SC               User Key  (r) = Recorded By, (t) = Taken By, (c) = Cosigned By      Initials Name Provider Type    SC Halle Tejeda OT Occupational Therapist                   Outcome Measures       Row Name 05/13/24 1505          How much help from another is currently needed...    Putting on and taking off regular lower body clothing? 1  -SC     Bathing (including washing, rinsing, and drying) 2  -SC     Toileting (which includes using toilet bed pan or urinal) 1  -SC     Putting on and taking off regular upper body clothing 4  -SC     Taking care of personal grooming (such as brushing teeth) 4  -SC     Eating meals 4  -SC     AM-PAC 6 Clicks Score (OT) 16  -SC       Row Name 05/13/24 1438 05/13/24 0900       How much help from another person do you currently need...    Turning from your back to your side while in flat bed without using bedrails? 3  -WM 3  -AA    Moving from lying on back to sitting on the side of a flat bed without bedrails? 2  -WM 2  -AA    Moving to and from a bed to a chair (including a wheelchair)? 2  -WM 2  -AA    Standing up from a chair using your arms (e.g., wheelchair, bedside chair)? 2  -WM 2  -AA    Climbing 3-5 steps with a railing? 1  -WM 1  -AA    To walk in hospital room? 1  -WM 1  -AA    AM-PAC 6 Clicks Score (PT) 11  -WM 11  -AA    Highest Level of Mobility Goal 4 --> Transfer to chair/commode  -WM 4 --> Transfer to chair/commode  -AA      Row Name 05/13/24 1505          Functional Assessment    Outcome Measure Options AM-PAC 6 Clicks Daily Activity (OT);Optimal Instrument  -SC       Row Name 05/13/24 1505          Optimal Instrument    Optimal Instrument Optimal - 3  -SC     Bending/Stooping 5   -SC     Standing 4  -SC     Reaching 2  -SC               User Key  (r) = Recorded By, (t) = Taken By, (c) = Cosigned By      Initials Name Provider Type    AA Uyen Chandra, RN Registered Nurse    Carson Johnson PTA Physical Therapist Assistant    SC Halle Tejeda, OT Occupational Therapist                  Section G              Section GG                         Occupational Therapy Education       Title: PT OT SLP Therapies (Done)       Topic: Occupational Therapy (Done)       Point: ADL training (Done)       Description:   Instruct learner(s) on proper safety adaptation and remediation techniques during self care or transfers.   Instruct in proper use of assistive devices.                  Learning Progress Summary             Patient Acceptance, E,D, DU by PG at 4/24/2024 1039    Acceptance, E,TB, VU by RM at 2/14/2024 0518    Acceptance, E,TB, VU by RM at 1/9/2024 0420    Acceptance, E, VU by CR at 12/30/2023 1750    Acceptance, E,TB, VU by RM at 11/30/2023 0420    Acceptance, E,D, DU by PG at 11/7/2023 0932                         Point: Home exercise program (Done)       Description:   Instruct learner(s) on appropriate technique for monitoring, assisting and/or progressing therapeutic exercises/activities.                  Learning Progress Summary             Patient Acceptance, E,D, DU by PG at 4/24/2024 1039    Acceptance, E,TB, VU by RM at 2/14/2024 0518    Acceptance, E,TB, VU by RM at 1/9/2024 0420    Acceptance, E, VU by CR at 12/30/2023 1750    Acceptance, E,TB, VU by RM at 11/30/2023 0420    Acceptance, E,D, DU by PG at 11/7/2023 0932                         Point: Precautions (Done)       Description:   Instruct learner(s) on prescribed precautions during self-care and functional transfers.                  Learning Progress Summary             Patient Acceptance, E,D, DU by PG at 4/24/2024 1039    Acceptance, E,TB, VU by RM at 2/14/2024 0518    Acceptance, E,TB, VU by RM at 1/9/2024 0420     Acceptance, E, VU by CR at 12/30/2023 1750    Acceptance, E,TB, VU by RM at 11/30/2023 0420    Acceptance, E,D, DU by PG at 11/7/2023 0932                         Point: Body mechanics (Done)       Description:   Instruct learner(s) on proper positioning and spine alignment during self-care, functional mobility activities and/or exercises.                  Learning Progress Summary             Patient Acceptance, E,D, DU by PG at 4/24/2024 1039    Acceptance, E,TB, VU by RM at 2/14/2024 0518    Acceptance, E,TB, VU by RM at 1/9/2024 0420    Acceptance, E, VU by CR at 12/30/2023 1750    Acceptance, E,TB, VU by RM at 11/30/2023 0420    Acceptance, E,D, DU by PG at 11/7/2023 0932                                         User Key       Initials Effective Dates Name Provider Type Discipline     06/08/21 -  Bharath Berg RN Registered Nurse Nurse    CR 06/13/22 -  Adilson Baker LPN Licensed Nurse Nurse     06/16/21 -  K[place]ak [place]os OT Occupational Therapist OT                  OT Recommendation and Plan  Planned Therapy Interventions (OT): activity tolerance training, functional balance retraining, patient/caregiver education/training, transfer/mobility retraining, strengthening exercise, BADL retraining  Therapy Frequency (OT): 5 times/wk  Plan of Care Review  Plan of Care Reviewed With: patient  Progress: no change  Outcome Evaluation: Patient continues to present with functional limitations of pain, LB strength, balance, and activity tolerance impacting his function for all self care and mobility. He has been seen by skilled OT since 4/24/24 min progress and fair participation in sit to stand trials, sitting at EOB , strength ex, and endurance exercise with omnicycle. Discussed concerns that patient has made min progress towards functional goals. Patient verbalizes understanding that in order to continue services, he needs to make functional progress. Patient is agreeable to continue strength/endurance exercise  with specific goals to shower w/ bariatric rolling shower chair, toilelt w/ drop arm 3:1; and trial use of claude for more consistent safe mobility of getting in/out of bed with floor staff.     Time Calculation:   Evaluation Complexity (OT)  Review Occupational Profile/Medical/Therapy History Complexity: expanded/moderate complexity  Assessment, Occupational Performance/Identification of Deficit Complexity: 1-3 performance deficits  Clinical Decision Making Complexity (OT): problem focused assessment/low complexity  Overall Complexity of Evaluation (OT): low complexity     Time Calculation- OT       Row Name 05/13/24 1509             Time Calculation- OT    OT Received On 05/13/24  -SC      OT Goal Re-Cert Due Date 05/23/24  -SC         Timed Charges    95150 - OT Therapeutic Activity Minutes 10  -SC         Untimed Charges    OT Eval/Re-eval Minutes 28  -SC         SNF Occupational Therapy Minutes    Skilled Minutes- OT 10 min  -SC         Total Minutes    Timed Charges Total Minutes 10  -SC      Untimed Charges Total Minutes 28  -SC       Total Minutes 38  -SC                User Key  (r) = Recorded By, (t) = Taken By, (c) = Cosigned By      Initials Name Provider Type    SC Halle Tejeda OT Occupational Therapist                  Therapy Charges for Today       Code Description Service Date Service Provider Modifiers Qty    [id number]  OT THERAPEUTIC ACT EA 15 MIN 5/13/2024 Halle Tejeda OT GO 1    [id number]  OT EVAL LOW COMPLEXITY 2 5/13/2024 Halle Tejeda OT GO 1                 Halle Tejeda OT  5/13/2024

## 2024-05-13 NOTE — THERAPY RE-EVALUATION
Acute Care - Physical Therapy Re-Evaluation  KEO Dominguez     Patient Name: Jose Shaikh  : 1960  MRN: [id number]  Today's Date: 2024      Admit date: 2023     Referring Physician: Cailin Acosta MD     Surgery Date:* No surgery found *            Visit Dx:     ICD-10-CM ICD-9-CM   1. Difficulty in walking  R26.2 719.7   2. Type 2 diabetes mellitus with other specified complication, unspecified whether long term insulin use  E11.69 250.80     Patient Active Problem List   Diagnosis    Diabetic ulcer of left heel associated with type 2 DM    Acute osteomyelitis of left calcaneus     Diabetic ulcer of left heel associated with type 2 DM    Diabetic ulcer of right midfoot associated with type 2 DM    Paroxysmal atrial fibrillation    Essential hypertension    Hyperlipidemia LDL goal <100    Cellulitis and abscess of foot    High alkaline phosphatase level    Osteomyelitis    Onychomycosis    Onychocryptosis    Foot pain, bilateral    Osteomyelitis of foot, right, acute    Cellulitis of right foot    Type 2 diabetes mellitus, with long-term current use of insulin    Class 3 severe obesity due to excess calories with serious comorbidity and body mass index (BMI) of 45.0 to 49.9 in adult    Anxiety disorder, unspecified    Claustrophobia    Dependence on wheelchair    Depression, unspecified    Long term (current) use of anticoagulants    Long term (current) use of oral hypoglycemic drugs    Wound of foot    Ulcer of right foot    Orthostatic hypotension    Other chronic osteomyelitis, right ankle and foot    Personal history of nicotine dependence    Thrombocytopenia, unspecified    Unspecified open wound, right foot, initial encounter    Diabetic foot infection    Subacute osteomyelitis of right foot    Right foot pain    Sepsis    Onychomycosis    Foot pain, left    Impaired mobility and ADLs    Absence of toe of right foot    Corns and callosity    Disability of walking    Fracture    Limb  swelling    Polyneuropathy    Pressure ulcer, stage 1    Shortness of breath    Generalized weakness    Debility    Ulcerated, foot, left, limited to breakdown of skin    Onychomycosis     Past Medical History:   Diagnosis Date    Absence of toe of right foot     Acute osteomyelitis of left calcaneus  08/18/2021    Anxiety and depression     Arthritis     Cancer     Chronic pain     STATES HIS PAIN IS 10/10 AAT    Claustrophobia     Corns and callus     Diabetic ulcer of left heel associated with type 2 DM 08/18/2021    Diabetic ulcer of left heel associated with type 2 DM 07/06/2021    Diabetic ulcer of right midfoot associated with type 2 DM 08/18/2021    Difficulty walking     Essential hypertension 08/31/2021    Hammertoe     Hyperlipidemia LDL goal <100 08/31/2021    Ingrown toenail     Obesity     Paroxysmal atrial fibrillation 08/31/2021    Polyneuropathy     Pressure ulcer, stage 1     Tinea unguium     Type 2 diabetes mellitus with polyneuropathy      Past Surgical History:   Procedure Laterality Date    CYST REMOVAL      center of back; benign    EYE SURGERY      INCISION AND DRAINAGE ABSCESS      back    INCISION AND DRAINAGE LEG Right 12/10/2021    Procedure: INCISION AND DRAINAGE LOWER EXTREMITY;  Surgeon: Ash Leyva DPM;  Location: [facility];  Service: Podiatry;  Laterality: Right;    OTHER SURGICAL HISTORY      Surgical clips left foot    TOE SURGERY Right     Removal of 5th toe    TRANS METATARSAL AMPUTATION Right 12/02/2021    Procedure: AMPUTATION TRANS METATARSAL;  Surgeon: Ash Leyva DPM;  Location: [facility];  Service: Podiatry;  Laterality: Right;    VASCULAR SURGERY      WRIST SURGERY Left     repair of injury     PT Assessment (Last 12 Hours)       PT Evaluation and Treatment       Row Name 05/13/24 1439 05/13/24 1428       Physical Therapy Time and Intention    Subjective Information complains of;pain  -MOE complains of;pain  -WM    Document Type  re-evaluation  -MOE therapy note (daily note)  -WM    Mode of Treatment individual therapy;physical therapy  -MOE individual therapy;physical therapy  -WM    Patient Effort -- adequate  -WM    Symptoms Noted During/After Treatment -- fatigue  -WM      Row Name 05/13/24 1428          Pain    Pretreatment Pain Rating 10/10  -WM     Posttreatment Pain Rating 10/10  -WM     Pain Location - Side/Orientation Left  -     Pain Location - hip  -WM       Row Name 05/13/24 1428          Bed Mobility    Supine-Sit-Supine Preble (Bed Mobility) minimum assist (75% patient effort);1 person assist  -     Bed Mobility, Safety Issues decreased use of legs for bridging/pushing  -     Assistive Device (Bed Mobility) bed rails;draw sheet;head of bed elevated  -       Row Name 05/13/24 1428          Sit-Stand Transfer    Sit-Stand Preble (Transfers) moderate assist (50% patient effort);2 person assist;verbal cues  -     Assistive Device (Sit-Stand Transfers) bariatric;walker, front-wheeled  -WM       Row Name 05/13/24 1428          Stand-Sit Transfer    Stand-Sit Preble (Transfers) moderate assist (50% patient effort);2 person assist;verbal cues  -     Assistive Device (Stand-Sit Transfers) bariatric;walker, front-wheeled  -WM       Row Name 05/13/24 1428          Safety Issues, Functional Mobility    Impairments Affecting Function (Mobility) balance;endurance/activity tolerance;pain;strength  -       Row Name             Wound 03/27/24 1045 Right anterior Skin Tear    Wound - Properties Group Placement Date: 03/27/24  -CR Placement Time: 1045  -CR Side: Right  -CR Orientation: anterior  -CR Primary Wound Type: Skin tear  -CR    Retired Wound - Properties Group Placement Date: 03/27/24  -CR Placement Time: 1045  -CR Side: Right  -CR Orientation: anterior  -CR Primary Wound Type: Skin tear  -CR    Retired Wound - Properties Group Date first assessed: 03/27/24  -CR Time first assessed: 1045  -CR Side: Right   -CR Primary Wound Type: Skin tear  -CR      Row Name             Wound 04/03/24 1730 Right anterior hip MASD (Moisture associated skin damage)    Wound - Properties Group Placement Date: 04/03/24  -KY Placement Time: 1730  -KY Side: Right  -KY Orientation: anterior  -KY Location: hip  -KY Primary Wound Type: MASD  -KY    Retired Wound - Properties Group Placement Date: 04/03/24  -KY Placement Time: 1730  -KY Side: Right  -KY Orientation: anterior  -KY Location: hip  -KY Primary Wound Type: MASD  -KY    Retired Wound - Properties Group Date first assessed: 04/03/24  -KY Time first assessed: 1730  -KY Side: Right  -KY Location: hip  -KY Primary Wound Type: MASD  -KY      Row Name             Wound 05/02/24 1033 Left posterior plantar    Wound - Properties Group Placement Date: 05/02/24  -KR Placement Time: 1033  -KR Side: Left  -KR Orientation: posterior  -KR Location: plantar  -KR    Retired Wound - Properties Group Placement Date: 05/02/24  -KR Placement Time: 1033  -KR Side: Left  -KR Orientation: posterior  -KR Location: plantar  -KR    Retired Wound - Properties Group Date first assessed: 05/02/24  -KR Time first assessed: 1033  -KR Side: Left  -KR Location: plantar  -KR      Row Name             Wound 05/09/24 2010 Right lateral abdomen Abrasion    Wound - Properties Group Placement Date: 05/09/24  -TM Placement Time: 2010 -TM Side: Right  -TM Orientation: lateral  -TM Location: abdomen  -TM Primary Wound Type: Abrasion  -TM    Retired Wound - Properties Group Placement Date: 05/09/24  -TM Placement Time: 2010 -TM Side: Right  -TM Orientation: lateral  -TM Location: abdomen  -TM Primary Wound Type: Abrasion  -TM    Retired Wound - Properties Group Date first assessed: 05/09/24  -TM Time first assessed: 2010 -TM Side: Right  -TM Location: abdomen  -TM Primary Wound Type: Abrasion  -TM      Row Name 05/13/24 1439          Plan of Care Review    Plan of Care Reviewed With patient  -MOE     Outcome Evaluation Pt  has made little progress since the last reevaluation.  He was instructed that he will need to show progress toward his goals by the next reevaluation date or we will be forced to d/c due to plateau.  Pt voiced understanding.  Goals below will be updated accordingly.  -MOE       Row Name 05/13/24 1428          Positioning and Restraints    Pre-Treatment Position in bed  -WM     Post Treatment Position bed  -WM     In Bed fowlers;call light within reach;encouraged to call for assist  -WM       Row Name 05/13/24 1428          Progress Summary (PT)    Progress Toward Functional Goals (PT) progress toward functional goals is gradual  -WM     Daily Progress Summary (PT) Pt stood 3 times with modA x 2 but was unable to take steps, due to left hip pain.  Pt also declined leg exercises. Will continue with POC  -WM       Row Name 05/13/24 1439          Bed Mobility Goal 1 (PT)    Activity/Assistive Device (Bed Mobility Goal 1, PT) bed mobility activities, all  -MOE     Todd Level/Cues Needed (Bed Mobility Goal 1, PT) independent  -MOE     Time Frame (Bed Mobility Goal 1, PT) 30 days;long term goal (LTG)  -MOE     Progress/Outcomes (Bed Mobility Goal 1, PT) progress slower than expected  -MOE       Row Name 05/13/24 1439          Transfer Goal 1 (PT)    Activity/Assistive Device (Transfer Goal 1, PT) transfers, all  -MOE     Todd Level/Cues Needed (Transfer Goal 1, PT) contact guard required  -MOE     Time Frame (Transfer Goal 1, PT) long term goal (LTG);10 days  -MOE     Progress/Outcome (Transfer Goal 1, PT) progress slower than expected  -MOE       Row Name 05/13/24 1439          Gait Training Goal 1 (PT)    Activity/Assistive Device (Gait Training Goal 1, PT) gait (walking locomotion);assistive device use;walker, rolling  -MOE     Todd Level (Gait Training Goal 1, PT) contact guard required  -MOE     Distance (Gait Training Goal 1, PT) 10  -MOE     Time Frame (Gait Training Goal 1, PT) long term goal (LTG);10  days  -MOE     Progress/Outcome (Gait Training Goal 1, PT) progress slower than expected  -MOE               User Key  (r) = Recorded By, (t) = Taken By, (c) = Cosigned By      Initials Name Provider Type    TM Rylee Orellana, RN Registered Nurse    Nicole Elise, RN Registered Nurse    Adilson Carmona LPN Licensed Nurse    Carson Johnson, CURTIS Physical Therapist Assistant    Merlin De La O, PT Physical Therapist    Almaz Morales RN Registered Nurse                    Physical Therapy Education       Title: PT OT SLP Therapies (Done)       Topic: Physical Therapy (Done)       Point: Mobility training (Done)       Learning Progress Summary             Patient Acceptance, E,D, DU by PG at 4/24/2024 1039    Acceptance, E,TB, VU by RM at 2/14/2024 0518    Acceptance, E,TB, VU by RM at 1/9/2024 0420    Acceptance, E, VU by CR at 12/30/2023 1750    Acceptance, E, VU by CR at 12/20/2023 1345    Acceptance, E, VU by CR at 12/19/2023 1004    Acceptance, E,TB, VU by RM at 11/30/2023 0420    Acceptance, E,TB, VU by MOE at 11/8/2023 1341                         Point: Precautions (Done)       Learning Progress Summary             Patient Acceptance, E,D, DU by PG at 4/24/2024 1039    Acceptance, E,TB, VU by RM at 2/14/2024 0518    Acceptance, E,TB, VU by RM at 1/9/2024 0420    Acceptance, E, VU by CR at 12/30/2023 1750    Acceptance, E,TB, VU by RM at 11/30/2023 0420    Acceptance, E,TB, VU by MOE at 11/8/2023 1341                                         User Key       Initials Effective Dates Name Provider Type Discipline     06/08/21 -  Bharath Berg, RN Registered Nurse Nurse    ROLY 06/13/22 -  Adilson Baker LPN Licensed Nurse Nurse    BOB 06/16/21 -  Kodak Matos OT Occupational Therapist OT    MOE 06/03/21 -  Merlin Rao, PT Physical Therapist PT                  PT Recommendation and Plan  Anticipated Discharge Disposition (PT): extended care facility  Planned Therapy Interventions (PT): balance  training, bed mobility training, gait training, home exercise program, stair training, strengthening, transfer training  Therapy Frequency (PT): 6 times/wk  Progress Summary (PT)  Progress Toward Functional Goals (PT): progress toward functional goals is fair  Daily Progress Summary (PT): Pt was better able to tolerate ambulation today.  For the first time he was able to advance his RLE without manual assistance.  Will continure current plan  Barriers to Overall Progress (PT): Pt has made progress over these last 10 days.  He is able to ambulate short distances with assistance of 2 and is performing BSC and chair transfers.  Will update plan accordingly and continue current plan for 10 more days.  Plan of Care Reviewed With: patient  Progress: improving  Outcome Evaluation: Pt has made little progress since the last reevaluation.  He was instructed that he will need to show progress toward his goals by the next reevaluation date or we will be forced to d/c due to plateau.  Pt voiced understanding.  Goals below will be updated accordingly.   Outcome Measures       Row Name 05/13/24 1438 05/11/24 1300          How much help from another person do you currently need...    Turning from your back to your side while in flat bed without using bedrails? 3  -WM 3  -CS     Moving from lying on back to sitting on the side of a flat bed without bedrails? 2  -WM 3  -CS     Moving to and from a bed to a chair (including a wheelchair)? 2  -WM 2  -CS     Standing up from a chair using your arms (e.g., wheelchair, bedside chair)? 2  -WM 2  -CS     Climbing 3-5 steps with a railing? 1  -WM 1  -CS     To walk in hospital room? 1  -WM 1  -CS     AM-PAC 6 Clicks Score (PT) 11  -WM 12  -CS     Highest Level of Mobility Goal 4 --> Transfer to chair/commode  -WM 4 --> Transfer to chair/commode  -CS        Functional Assessment    Outcome Measure Options -- AM-PAC 6 Clicks Basic Mobility (PT)  -CS               User Key  (r) = Recorded By,  (t) = Taken By, (c) = Cosigned By      Initials Name Provider Type    Carson Johnson PTA Physical Therapist Assistant    Ronald Huerta PTA Physical Therapist Assistant                     Time Calculation:    PT Charges       Row Name 05/13/24 1434 05/13/24 1425          Time Calculation    PT Received On -- 05/13/24  -WM        Timed Charges    56710 - PT Therapeutic Activity Minutes -- 25  -WM        Untimed Charges    PT Eval/Re-eval Minutes 20  -MOE --        SNF Physical Therapy Minutes    Skilled Minutes- PT -- 25 min  -WM        Total Minutes    Timed Charges Total Minutes -- 25  -WM     Untimed Charges Total Minutes 20  -MOE --      Total Minutes 20  -MOE 25  -WM               User Key  (r) = Recorded By, (t) = Taken By, (c) = Cosigned By      Initials Name Provider Type     Carson Inman, CURTIS Physical Therapist Assistant    Merlin De La O, MERLIN Physical Therapist                  Therapy Charges for Today       Code Description Service Date Service Provider Modifiers Qty    [id number] HC PT RE-EVAL ESTABLISHED PLAN 2 5/13/2024 Merlin Rao PT GP 1            PT G-Codes  Outcome Measure Options: AM-PAC 6 Clicks Basic Mobility (PT)  AM-PAC 6 Clicks Score (PT): 11  AM-PAC 6 Clicks Score (OT): 16    Merlin Rao PT  5/13/2024

## 2024-05-14 LAB
GLUCOSE BLDC GLUCOMTR-MCNC: 110 MG/DL (ref 70–99)
GLUCOSE BLDC GLUCOMTR-MCNC: 125 MG/DL (ref 70–99)
GLUCOSE BLDC GLUCOMTR-MCNC: 144 MG/DL (ref 70–99)
GLUCOSE BLDC GLUCOMTR-MCNC: 146 MG/DL (ref 70–99)

## 2024-05-14 PROCEDURE — 63710000001 INSULIN DETEMIR PER 5 UNITS: Performed by: PHYSICIAN ASSISTANT

## 2024-05-14 PROCEDURE — 82948 REAGENT STRIP/BLOOD GLUCOSE: CPT

## 2024-05-14 PROCEDURE — 97110 THERAPEUTIC EXERCISES: CPT

## 2024-05-14 PROCEDURE — 82948 REAGENT STRIP/BLOOD GLUCOSE: CPT | Performed by: INTERNAL MEDICINE

## 2024-05-14 RX ADMIN — CYANOCOBALAMIN TAB 500 MCG 1000 MCG: 500 TAB at 08:26

## 2024-05-14 RX ADMIN — ACETAMINOPHEN 650 MG: 325 TABLET ORAL at 02:24

## 2024-05-14 RX ADMIN — IBUPROFEN 400 MG: 400 TABLET ORAL at 21:25

## 2024-05-14 RX ADMIN — BACLOFEN 10 MG: 10 TABLET ORAL at 16:54

## 2024-05-14 RX ADMIN — INSULIN DETEMIR 40 UNITS: 100 INJECTION, SOLUTION SUBCUTANEOUS at 21:25

## 2024-05-14 RX ADMIN — LISINOPRIL 2.5 MG: 2.5 TABLET ORAL at 08:26

## 2024-05-14 RX ADMIN — BACLOFEN 10 MG: 10 TABLET ORAL at 07:14

## 2024-05-14 RX ADMIN — Medication 500 MG: at 21:25

## 2024-05-14 RX ADMIN — Medication 10 MG: at 21:25

## 2024-05-14 RX ADMIN — APIXABAN 5 MG: 5 TABLET, FILM COATED ORAL at 08:26

## 2024-05-14 RX ADMIN — OXYCODONE HYDROCHLORIDE AND ACETAMINOPHEN 1 TABLET: 10; 325 TABLET ORAL at 16:54

## 2024-05-14 RX ADMIN — PREGABALIN 100 MG: 100 CAPSULE ORAL at 16:49

## 2024-05-14 RX ADMIN — EMPAGLIFLOZIN 10 MG: 10 TABLET, FILM COATED ORAL at 08:26

## 2024-05-14 RX ADMIN — DICLOFENAC SODIUM 4 G: 10 GEL TOPICAL at 21:28

## 2024-05-14 RX ADMIN — DICLOFENAC SODIUM 4 G: 10 GEL TOPICAL at 12:35

## 2024-05-14 RX ADMIN — FERROUS SULFATE TAB 325 MG (65 MG ELEMENTAL FE) 325 MG: 325 (65 FE) TAB at 08:26

## 2024-05-14 RX ADMIN — INSULIN DETEMIR 40 UNITS: 100 INJECTION, SOLUTION SUBCUTANEOUS at 08:25

## 2024-05-14 RX ADMIN — PREGABALIN 100 MG: 100 CAPSULE ORAL at 08:26

## 2024-05-14 RX ADMIN — Medication 500 MG: at 08:26

## 2024-05-14 RX ADMIN — FUROSEMIDE 40 MG: 40 TABLET ORAL at 08:26

## 2024-05-14 RX ADMIN — DICLOFENAC SODIUM 4 G: 10 GEL TOPICAL at 08:25

## 2024-05-14 RX ADMIN — OXYCODONE HYDROCHLORIDE AND ACETAMINOPHEN 1 TABLET: 10; 325 TABLET ORAL at 07:14

## 2024-05-14 RX ADMIN — ATORVASTATIN CALCIUM 10 MG: 10 TABLET, FILM COATED ORAL at 21:25

## 2024-05-14 RX ADMIN — DICLOFENAC SODIUM 4 G: 10 GEL TOPICAL at 17:56

## 2024-05-14 RX ADMIN — SERTRALINE HYDROCHLORIDE 50 MG: 50 TABLET ORAL at 21:25

## 2024-05-14 RX ADMIN — ACETAMINOPHEN 650 MG: 325 TABLET ORAL at 23:28

## 2024-05-14 RX ADMIN — PREGABALIN 100 MG: 100 CAPSULE ORAL at 21:25

## 2024-05-14 RX ADMIN — APIXABAN 5 MG: 5 TABLET, FILM COATED ORAL at 21:25

## 2024-05-14 RX ADMIN — CETIRIZINE HYDROCHLORIDE 10 MG: 10 TABLET, FILM COATED ORAL at 08:26

## 2024-05-14 NOTE — PLAN OF CARE
Goal Outcome Evaluation:  Plan of Care Reviewed With: patient        Progress: improving     Patient is alert and oriented x 4. Patient is able to make needs known to staff. Urinal at bedside. Patient uses bedpan.  Patient medicated with  Percocet and Baclofen x 1 this shift for left shoulder and hip pain. Wound care tolerated well. Will continue with current plan of care.

## 2024-05-14 NOTE — THERAPY TREATMENT NOTE
SNF - Occupational Therapy Treatment Note  KEO Dominguez    Patient Name: Jose Shaikh  : 1960    MRN: [id number]                              Today's Date: 2024       Admit Date: 2023    Visit Dx:     ICD-10-CM ICD-9-CM   1. Difficulty in walking  R26.2 719.7   2. Type 2 diabetes mellitus with other specified complication, unspecified whether long term insulin use  E11.69 250.80     Patient Active Problem List   Diagnosis    Diabetic ulcer of left heel associated with type 2 DM    Acute osteomyelitis of left calcaneus     Diabetic ulcer of left heel associated with type 2 DM    Diabetic ulcer of right midfoot associated with type 2 DM    Paroxysmal atrial fibrillation    Essential hypertension    Hyperlipidemia LDL goal <100    Cellulitis and abscess of foot    High alkaline phosphatase level    Osteomyelitis    Onychomycosis    Onychocryptosis    Foot pain, bilateral    Osteomyelitis of foot, right, acute    Cellulitis of right foot    Type 2 diabetes mellitus, with long-term current use of insulin    Class 3 severe obesity due to excess calories with serious comorbidity and body mass index (BMI) of 45.0 to 49.9 in adult    Anxiety disorder, unspecified    Claustrophobia    Dependence on wheelchair    Depression, unspecified    Long term (current) use of anticoagulants    Long term (current) use of oral hypoglycemic drugs    Wound of foot    Ulcer of right foot    Orthostatic hypotension    Other chronic osteomyelitis, right ankle and foot    Personal history of nicotine dependence    Thrombocytopenia, unspecified    Unspecified open wound, right foot, initial encounter    Diabetic foot infection    Subacute osteomyelitis of right foot    Right foot pain    Sepsis    Onychomycosis    Foot pain, left    Impaired mobility and ADLs    Absence of toe of right foot    Corns and callosity    Disability of walking    Fracture    Limb swelling    Polyneuropathy    Pressure ulcer, stage 1    Shortness of  breath    Generalized weakness    Debility    Ulcerated, foot, left, limited to breakdown of skin    Onychomycosis     Past Medical History:   Diagnosis Date    Absence of toe of right foot     Acute osteomyelitis of left calcaneus  08/18/2021    Anxiety and depression     Arthritis     Cancer     Chronic pain     STATES HIS PAIN IS 10/10 AAT    Claustrophobia     Corns and callus     Diabetic ulcer of left heel associated with type 2 DM 08/18/2021    Diabetic ulcer of left heel associated with type 2 DM 07/06/2021    Diabetic ulcer of right midfoot associated with type 2 DM 08/18/2021    Difficulty walking     Essential hypertension 08/31/2021    Hammertoe     Hyperlipidemia LDL goal <100 08/31/2021    Ingrown toenail     Obesity     Paroxysmal atrial fibrillation 08/31/2021    Polyneuropathy     Pressure ulcer, stage 1     Tinea unguium     Type 2 diabetes mellitus with polyneuropathy      Past Surgical History:   Procedure Laterality Date    CYST REMOVAL      center of back; benign    EYE SURGERY      INCISION AND DRAINAGE ABSCESS      back    INCISION AND DRAINAGE LEG Right 12/10/2021    Procedure: INCISION AND DRAINAGE LOWER EXTREMITY;  Surgeon: Ash Leyva DPM;  Location: [facility] MAIN OR;  Service: Podiatry;  Laterality: Right;    OTHER SURGICAL HISTORY      Surgical clips left foot    TOE SURGERY Right     Removal of 5th toe    TRANS METATARSAL AMPUTATION Right 12/02/2021    Procedure: AMPUTATION TRANS METATARSAL;  Surgeon: Ash Leyva DPM;  Location: [facility] MAIN OR;  Service: Podiatry;  Laterality: Right;    VASCULAR SURGERY      WRIST SURGERY Left     repair of injury      General Information       Row Name 05/14/24 6078          OT Time and Intention    Document Type therapy note (daily note)  -GC     Mode of Treatment individual therapy;occupational therapy  -GC       Row Name 05/14/24 1165          General Information    Patient Profile Reviewed yes  -GC     Existing  Precautions/Restrictions fall  -               User Key  (r) = Recorded By, (t) = Taken By, (c) = Cosigned By      Initials Name Provider Type     Rosalva Irvin OT Occupational Therapist                     Mobility/ADL's    No documentation.                  Obj/Interventions       Row Name 05/14/24 [facility]          Shoulder (Therapeutic Exercise)    Shoulder (Therapeutic Exercise) strengthening exercise  -     Shoulder Strengthening (Therapeutic Exercise) bilateral;flexion;extension;aBduction;aDduction;horizontal aBduction/aDduction;15 repititions;2 sets;resistance tubing  -       Row Name 05/14/24 [facility]          Elbow/Forearm (Therapeutic Exercise)    Elbow/Forearm (Therapeutic Exercise) strengthening exercise  -     Elbow/Forearm Strengthening (Therapeutic Exercise) bilateral;flexion;extension;15 repititions;2 sets  8lbs  -       Row Name 05/14/24 [facility]          Motor Skills    Therapeutic Exercise shoulder;elbow/forearm  -               User Key  (r) = Recorded By, (t) = Taken By, (c) = Cosigned By      Initials Name Provider Type     Rosalva Irvin OT Occupational Therapist                   Goals/Plan    No documentation.                  Clinical Impression       [facility] Name 05/14/24 [facility]          Pain Scale: FACES Pre/Post-Treatment    Pain: FACES Scale, Pretreatment 0-->no hurt  -     Posttreatment Pain Rating 0-->no hurt  -       Row Name 05/14/24 [facility]          Plan of Care Review    Plan of Care Reviewed With patient  -     Progress no change  -     Outcome Evaluation Attempted tx several times with patient having diarrhea and on bedpan. Nursing is aware.   Pt. in agreement to perform TE at [facility].  Pt. was in agreement to attempt transfering training tomorrow with drop arm [facility].  Plan to continue POC established.  -       Row Name 05/14/24 [facility]          Therapy Plan Review/Discharge Plan (OT)    Anticipated Discharge Disposition (OT) extended care facility  -               User Key   (r) = Recorded By, (t) = Taken By, (c) = Cosigned By      Initials Name Provider Type    Rosalva Campbell OT Occupational Therapist                   Outcome Measures       Row Name 05/14/24 1355          How much help from another is currently needed...    Putting on and taking off regular lower body clothing? 1  -GC     Bathing (including washing, rinsing, and drying) 2  -GC     Toileting (which includes using toilet bed pan or urinal) 1  -GC     Putting on and taking off regular upper body clothing 4  -GC     Taking care of personal grooming (such as brushing teeth) 4  -GC     Eating meals 4  -GC     AM-PAC 6 Clicks Score (OT) 16  -GC       Row Name 05/14/24 1355          Functional Assessment    Outcome Measure Options AM-PAC 6 Clicks Daily Activity (OT);Optimal Instrument  -GC       Row Name 05/14/24 1357          Optimal Instrument    Optimal Instrument Optimal - 3  -GC     Bending/Stooping 5  -GC     Standing 4  -GC     Reaching 2  -GC               User Key  (r) = Recorded By, (t) = Taken By, (c) = Cosigned By      Initials Name Provider Type    Rosalva Campbell OT Occupational Therapist                  Section G              Section GG                         Occupational Therapy Education       Title: PT OT SLP Therapies (Done)       Topic: Occupational Therapy (Done)       Point: ADL training (Done)       Description:   Instruct learner(s) on proper safety adaptation and remediation techniques during self care or transfers.   Instruct in proper use of assistive devices.                  Learning Progress Summary             Patient Acceptance, E,D, DU by PG at 4/24/2024 1039    Acceptance, E,TB, VU by RM at 2/14/2024 0518    Acceptance, E,TB, VU by RM at 1/9/2024 0420    Acceptance, E, VU by CR at 12/30/2023 1750    Acceptance, E,TB, VU by RM at 11/30/2023 0420    Acceptance, E,D, DU by PG at 11/7/2023 0932                         Point: Home exercise program (Done)       Description:   Instruct  learner(s) on appropriate technique for monitoring, assisting and/or progressing therapeutic exercises/activities.                  Learning Progress Summary             Patient Acceptance, E,D, DU by PG at 4/24/2024 1039    Acceptance, E,TB, VU by RM at 2/14/2024 0518    Acceptance, E,TB, VU by RM at 1/9/2024 0420    Acceptance, E, VU by CR at 12/30/2023 1750    Acceptance, E,TB, VU by RM at 11/30/2023 0420    Acceptance, E,D, DU by PG at 11/7/2023 0932                         Point: Precautions (Done)       Description:   Instruct learner(s) on prescribed precautions during self-care and functional transfers.                  Learning Progress Summary             Patient Acceptance, E,D, DU by PG at 4/24/2024 1039    Acceptance, E,TB, VU by RM at 2/14/2024 0518    Acceptance, E,TB, VU by RM at 1/9/2024 0420    Acceptance, E, VU by CR at 12/30/2023 1750    Acceptance, E,TB, VU by RM at 11/30/2023 0420    Acceptance, E,D, DU by PG at 11/7/2023 0932                         Point: Body mechanics (Done)       Description:   Instruct learner(s) on proper positioning and spine alignment during self-care, functional mobility activities and/or exercises.                  Learning Progress Summary             Patient Acceptance, E,D, DU by PG at 4/24/2024 1039    Acceptance, E,TB, VU by RM at 2/14/2024 0518    Acceptance, E,TB, VU by RM at 1/9/2024 0420    Acceptance, E, VU by CR at 12/30/2023 1750    Acceptance, E,TB, VU by RM at 11/30/2023 0420    Acceptance, E,D, DU by PG at 11/7/2023 0932                                         User Key       Initials Effective Dates Name Provider Type Discipline     06/08/21 -  Bharath Berg, RN Registered Nurse Nurse    CR 06/13/22 -  Adilson Baker LPN Licensed Nurse Nurse    PG 06/16/21 -  Kodak Matos OT Occupational Therapist OT                  OT Recommendation and Plan     Plan of Care Review  Plan of Care Reviewed With: patient  Progress: no change  Outcome Evaluation:  Attempted tx several times with patient having diarrhea and on bedpan. Nursing is aware.   Pt. in agreement to perform TE at [facility].  Pt. was in agreement to attempt transfering training tomorrow with drop arm [facility].  Plan to continue POC established.     Time Calculation:         Time Calculation- OT       Row Name 05/14/24 1356             Time Calculation- OT    OT Received On 05/14/24  -GC         Timed Charges    60602 - OT Therapeutic Exercise Minutes 26  -GC         SNF Occupational Therapy Minutes    Skilled Minutes- OT 26 min  -GC         Total Minutes    Timed Charges Total Minutes 26  -GC       Total Minutes 26  -GC                User Key  (r) = Recorded By, (t) = Taken By, (c) = Cosigned By      Initials Name Provider Type     Rosalva Irvin OT Occupational Therapist                  Therapy Charges for Today       Code Description Service Date Service Provider Modifiers Qty    [id number] HC OT THER PROC EA 15 MIN 5/14/2024 Rosalva Irvin OT GO 2                 Rosalva Irvin OT  5/14/2024

## 2024-05-14 NOTE — PLAN OF CARE
Goal Outcome Evaluation:           Progress: no change  Outcome Evaluation: Rsd. is alert and oriented x4, able to make needs known to staff.  Rsd. transferred x2 assist, refused transfer, used bedpan and urinal this shift.      Call light and personal items within reach, Rsd. reminded to use call light for assistance, verbalizes understanding. Will continue to monitor and notify on-coming shift.  Rsd. Medicated x1 this shift with prn tylenol, oxycodone and baclofen, see emar.  Rsd. States effective.  Rsd. tolerated well.  Current plan of care continues at this time.

## 2024-05-15 LAB
GLUCOSE BLDC GLUCOMTR-MCNC: 106 MG/DL (ref 70–99)
GLUCOSE BLDC GLUCOMTR-MCNC: 138 MG/DL (ref 70–99)
GLUCOSE BLDC GLUCOMTR-MCNC: 210 MG/DL (ref 70–99)

## 2024-05-15 PROCEDURE — 97116 GAIT TRAINING THERAPY: CPT

## 2024-05-15 PROCEDURE — 97530 THERAPEUTIC ACTIVITIES: CPT

## 2024-05-15 PROCEDURE — 82948 REAGENT STRIP/BLOOD GLUCOSE: CPT

## 2024-05-15 PROCEDURE — 63710000001 INSULIN DETEMIR PER 5 UNITS: Performed by: PHYSICIAN ASSISTANT

## 2024-05-15 PROCEDURE — 63710000001 INSULIN LISPRO (HUMAN) PER 5 UNITS: Performed by: INTERNAL MEDICINE

## 2024-05-15 RX ADMIN — OXYCODONE HYDROCHLORIDE AND ACETAMINOPHEN 1 TABLET: 10; 325 TABLET ORAL at 01:43

## 2024-05-15 RX ADMIN — CYANOCOBALAMIN TAB 500 MCG 1000 MCG: 500 TAB at 08:46

## 2024-05-15 RX ADMIN — OXYCODONE HYDROCHLORIDE AND ACETAMINOPHEN 1 TABLET: 10; 325 TABLET ORAL at 18:16

## 2024-05-15 RX ADMIN — DICLOFENAC SODIUM 4 G: 10 GEL TOPICAL at 08:48

## 2024-05-15 RX ADMIN — INSULIN DETEMIR 40 UNITS: 100 INJECTION, SOLUTION SUBCUTANEOUS at 08:47

## 2024-05-15 RX ADMIN — POLYETHYLENE GLYCOL 400 AND PROPYLENE GLYCOL 1 DROP: 4; 3 SOLUTION/ DROPS OPHTHALMIC at 21:05

## 2024-05-15 RX ADMIN — LISINOPRIL 2.5 MG: 2.5 TABLET ORAL at 08:46

## 2024-05-15 RX ADMIN — FERROUS SULFATE TAB 325 MG (65 MG ELEMENTAL FE) 325 MG: 325 (65 FE) TAB at 08:46

## 2024-05-15 RX ADMIN — IBUPROFEN 400 MG: 400 TABLET ORAL at 21:01

## 2024-05-15 RX ADMIN — Medication 10 MG: at 21:01

## 2024-05-15 RX ADMIN — DICLOFENAC SODIUM 4 G: 10 GEL TOPICAL at 21:04

## 2024-05-15 RX ADMIN — INSULIN LISPRO 8 UNITS: 100 INJECTION, SOLUTION INTRAVENOUS; SUBCUTANEOUS at 17:38

## 2024-05-15 RX ADMIN — ATORVASTATIN CALCIUM 10 MG: 10 TABLET, FILM COATED ORAL at 21:01

## 2024-05-15 RX ADMIN — CETIRIZINE HYDROCHLORIDE 10 MG: 10 TABLET, FILM COATED ORAL at 08:47

## 2024-05-15 RX ADMIN — Medication 500 MG: at 08:47

## 2024-05-15 RX ADMIN — BACLOFEN 10 MG: 10 TABLET ORAL at 01:43

## 2024-05-15 RX ADMIN — DICLOFENAC SODIUM 4 G: 10 GEL TOPICAL at 17:38

## 2024-05-15 RX ADMIN — PREGABALIN 100 MG: 100 CAPSULE ORAL at 21:01

## 2024-05-15 RX ADMIN — PREGABALIN 100 MG: 100 CAPSULE ORAL at 08:47

## 2024-05-15 RX ADMIN — DICLOFENAC SODIUM 4 G: 10 GEL TOPICAL at 11:30

## 2024-05-15 RX ADMIN — POLYETHYLENE GLYCOL 400 AND PROPYLENE GLYCOL 1 DROP: 4; 3 SOLUTION/ DROPS OPHTHALMIC at 16:28

## 2024-05-15 RX ADMIN — IBUPROFEN 400 MG: 400 TABLET ORAL at 05:50

## 2024-05-15 RX ADMIN — BACLOFEN 10 MG: 10 TABLET ORAL at 18:15

## 2024-05-15 RX ADMIN — APIXABAN 5 MG: 5 TABLET, FILM COATED ORAL at 21:01

## 2024-05-15 RX ADMIN — OXYCODONE HYDROCHLORIDE AND ACETAMINOPHEN 1 TABLET: 10; 325 TABLET ORAL at 09:42

## 2024-05-15 RX ADMIN — BACLOFEN 10 MG: 10 TABLET ORAL at 09:42

## 2024-05-15 RX ADMIN — PREGABALIN 100 MG: 100 CAPSULE ORAL at 16:28

## 2024-05-15 RX ADMIN — APIXABAN 5 MG: 5 TABLET, FILM COATED ORAL at 08:46

## 2024-05-15 RX ADMIN — EMPAGLIFLOZIN 10 MG: 10 TABLET, FILM COATED ORAL at 08:46

## 2024-05-15 RX ADMIN — INSULIN DETEMIR 40 UNITS: 100 INJECTION, SOLUTION SUBCUTANEOUS at 21:01

## 2024-05-15 RX ADMIN — Medication 500 MG: at 21:01

## 2024-05-15 RX ADMIN — SERTRALINE HYDROCHLORIDE 50 MG: 50 TABLET ORAL at 21:01

## 2024-05-15 RX ADMIN — FUROSEMIDE 40 MG: 40 TABLET ORAL at 08:46

## 2024-05-15 NOTE — PLAN OF CARE
Goal Outcome Evaluation:           Progress: no change  Outcome Evaluation: Rsd. is alert and oriented x4, able to make needs known to staff.  Medicated with prn Oxycodone, Motrin and tylenol this shift, See emar.  Rsd. states medications effective.  Activity encouraged, but did not get out of bed.  Trapeze bar overhead, Rsd. about to reposition and weight shift, but refuses to turn.  Rsd. is incontinent of bowel at times, urinal at bedside.  Call light and personal items within reach.  Rsd. reminded to use call light for assistance, verbalizes understanding.  Will continue to monitor and notify on-coming staff.  Current plan of care remains in place at this time.

## 2024-05-15 NOTE — THERAPY TREATMENT NOTE
SNF - Physical Therapy Treatment Note  KEO Dominguez     Patient Name: Jose Shaikh  : 1960  MRN: [id number]  Today's Date: 5/15/2024      Visit Dx:     ICD-10-CM ICD-9-CM   1. Difficulty in walking  R26.2 719.7   2. Type 2 diabetes mellitus with other specified complication, unspecified whether long term insulin use  E11.69 250.80     Patient Active Problem List   Diagnosis    Diabetic ulcer of left heel associated with type 2 DM    Acute osteomyelitis of left calcaneus     Diabetic ulcer of left heel associated with type 2 DM    Diabetic ulcer of right midfoot associated with type 2 DM    Paroxysmal atrial fibrillation    Essential hypertension    Hyperlipidemia LDL goal <100    Cellulitis and abscess of foot    High alkaline phosphatase level    Osteomyelitis    Onychomycosis    Onychocryptosis    Foot pain, bilateral    Osteomyelitis of foot, right, acute    Cellulitis of right foot    Type 2 diabetes mellitus, with long-term current use of insulin    Class 3 severe obesity due to excess calories with serious comorbidity and body mass index (BMI) of 45.0 to 49.9 in adult    Anxiety disorder, unspecified    Claustrophobia    Dependence on wheelchair    Depression, unspecified    Long term (current) use of anticoagulants    Long term (current) use of oral hypoglycemic drugs    Wound of foot    Ulcer of right foot    Orthostatic hypotension    Other chronic osteomyelitis, right ankle and foot    Personal history of nicotine dependence    Thrombocytopenia, unspecified    Unspecified open wound, right foot, initial encounter    Diabetic foot infection    Subacute osteomyelitis of right foot    Right foot pain    Sepsis    Onychomycosis    Foot pain, left    Impaired mobility and ADLs    Absence of toe of right foot    Corns and callosity    Disability of walking    Fracture    Limb swelling    Polyneuropathy    Pressure ulcer, stage 1    Shortness of breath    Generalized weakness    Debility    Ulcerated,  foot, left, limited to breakdown of skin    Onychomycosis     Past Medical History:   Diagnosis Date    Absence of toe of right foot     Acute osteomyelitis of left calcaneus  08/18/2021    Anxiety and depression     Arthritis     Cancer     Chronic pain     STATES HIS PAIN IS 10/10 AAT    Claustrophobia     Corns and callus     Diabetic ulcer of left heel associated with type 2 DM 08/18/2021    Diabetic ulcer of left heel associated with type 2 DM 07/06/2021    Diabetic ulcer of right midfoot associated with type 2 DM 08/18/2021    Difficulty walking     Essential hypertension 08/31/2021    Hammertoe     Hyperlipidemia LDL goal <100 08/31/2021    Ingrown toenail     Obesity     Paroxysmal atrial fibrillation 08/31/2021    Polyneuropathy     Pressure ulcer, stage 1     Tinea unguium     Type 2 diabetes mellitus with polyneuropathy      Past Surgical History:   Procedure Laterality Date    CYST REMOVAL      center of back; benign    EYE SURGERY      INCISION AND DRAINAGE ABSCESS      back    INCISION AND DRAINAGE LEG Right 12/10/2021    Procedure: INCISION AND DRAINAGE LOWER EXTREMITY;  Surgeon: Ash Leyva DPM;  Location: [facility];  Service: Podiatry;  Laterality: Right;    OTHER SURGICAL HISTORY      Surgical clips left foot    TOE SURGERY Right     Removal of 5th toe    TRANS METATARSAL AMPUTATION Right 12/02/2021    Procedure: AMPUTATION TRANS METATARSAL;  Surgeon: Ash Leyva DPM;  Location: [facility] OR;  Service: Podiatry;  Laterality: Right;    VASCULAR SURGERY      WRIST SURGERY Left     repair of injury     PT Assessment (Last 12 Hours)       PT Evaluation and Treatment       Row Name 05/15/24 1300          Physical Therapy Time and Intention    Subjective Information no complaints  -MOE     Document Type therapy note (daily note)  -MOE     Mode of Treatment individual therapy;physical therapy  -MOE     Patient Effort adequate  -MOE       Row Name 05/15/24 1300          General  Information    Patient Observations alert;cooperative;agree to therapy  -MOE       Row Name 05/15/24 1300          Bed Mobility    Bed Mobility bed mobility (all) activities;supine-sit  -MOE     All Activities, [place] (Bed Mobility) minimum assist (75% patient effort)  -MOE     Supine-Sit [place] (Bed Mobility) minimum assist (75% patient effort)  -MOE       Row Name 05/15/24 1300          Bed-Chair Transfer    Bed-Chair [place] (Transfers) minimum assist (75% patient effort);2 person assist  -MOE     Assistive Device (Bed-Chair Transfers) walker, front-wheeled  -MOE       Row Name 05/15/24 1300          Sit-Stand Transfer    Sit-Stand [place] (Transfers) moderate assist (50% patient effort);2 person assist  -MOE     Assistive Device (Sit-Stand Transfers) walker, front-wheeled  -MOE       Row Name 05/15/24 1300          Stand Pivot/Stand Step Transfer    Stand Pivot/Stand Step [place] (Transfers) moderate assist (50% patient effort);2 person assist  -MOE     Assistive Device (Stand Pivot Stand Step Transfer) walker, front-wheeled  -MOE       Row Name 05/15/24 1300          Gait/Stairs (Locomotion)    Gait/Stairs Locomotion gait/ambulation assistive device  -MOE     [place] Level (Gait) minimum assist (75% patient effort)  -MOE     Assistive Device (Gait) walker, front-wheeled  -MOE     Distance in Feet (Gait) 6  pt ambulated 3 ft, 2 ft and 1 ft in 3 seperate bouts with about a 2 min rest inbetween.  -MOE       Row Name 05/15/24 1300          Safety Issues, Functional Mobility    Impairments Affecting Function (Mobility) balance;endurance/activity tolerance;pain;strength  -MOE       Row Name 05/15/24 1300          Balance    Dynamic Standing Balance minimal assist;2-person assist  -MOE     Position/Device Used, Standing Balance walker, front-wheeled  -MOE       Row Name             Wound 03/27/24 1045 Right anterior Skin Tear    Wound - Properties Group Placement Date: 03/27/24  -CR Placement Time: 1045   -CR Side: Right  -CR Orientation: anterior  -CR Primary Wound Type: Skin tear  -CR    Retired Wound - Properties Group Placement Date: 03/27/24  -CR Placement Time: 1045  -CR Side: Right  -CR Orientation: anterior  -CR Primary Wound Type: Skin tear  -CR    Retired Wound - Properties Group Date first assessed: 03/27/24  -CR Time first assessed: 1045  -CR Side: Right  -CR Primary Wound Type: Skin tear  -CR      Row Name             Wound 04/03/24 1730 Right anterior hip MASD (Moisture associated skin damage)    Wound - Properties Group Placement Date: 04/03/24  -WV Placement Time: 1730  -WV Side: Right  -WV Orientation: anterior  -WV Location: hip  -WV Primary Wound Type: MASD  -WV    Retired Wound - Properties Group Placement Date: 04/03/24  -WV Placement Time: 1730  -WV Side: Right  -WV Orientation: anterior  -WV Location: hip  -WV Primary Wound Type: MASD  -WV    Retired Wound - Properties Group Date first assessed: 04/03/24  -WV Time first assessed: 1730  -WV Side: Right  -WV Location: hip  -WV Primary Wound Type: MASD  -WV      Row Name             Wound 05/02/24 1033 Left posterior plantar    Wound - Properties Group Placement Date: 05/02/24  -KR Placement Time: 1033  -KR Side: Left  -KR Orientation: posterior  -KR Location: plantar  -KR    Retired Wound - Properties Group Placement Date: 05/02/24  -KR Placement Time: 1033  -KR Side: Left  -KR Orientation: posterior  -KR Location: plantar  -KR    Retired Wound - Properties Group Date first assessed: 05/02/24  -KR Time first assessed: 1033  -KR Side: Left  -KR Location: plantar  -KR      Row Name             Wound 05/09/24 2010 Right lateral abdomen Abrasion    Wound - Properties Group Placement Date: 05/09/24  -TM Placement Time: 2010  -TM Side: Right  -TM Orientation: lateral  -TM Location: abdomen  -TM Primary Wound Type: Abrasion  -TM    Retired Wound - Properties Group Placement Date: 05/09/24  -TM Placement Time: 2010  -TM Side: Right  -TM Orientation:  lateral  -TM Location: abdomen  -TM Primary Wound Type: Abrasion  -TM    Retired Wound - Properties Group Date first assessed: 05/09/24  -TM Time first assessed: 2010 -TM Side: Right  -TM Location: abdomen  -TM Primary Wound Type: Abrasion  -TM      Row Name 05/15/24 1300          Progress Summary (PT)    Progress Toward Functional Goals (PT) progress toward functional goals is gradual  -MOE               User Key  (r) = Recorded By, (t) = Taken By, (c) = Cosigned By      Initials Name Provider Type    TM Rylee Orellana, RN Registered Nurse    Nicole Elise, RN Registered Nurse    Adilson Carmona LPN Licensed Nurse    Merlin De La O, PT Physical Therapist    Almaz Morales RN Registered Nurse                    Physical Therapy Education       Title: PT OT SLP Therapies (Done)       Topic: Physical Therapy (Done)       Point: Mobility training (Done)       Learning Progress Summary             Patient Acceptance, E,D, DU by PG at 4/24/2024 1039    Acceptance, E,TB, VU by RM at 2/14/2024 0518    Acceptance, E,TB, VU by RM at 1/9/2024 0420    Acceptance, E, VU by CR at 12/30/2023 1750    Acceptance, E, VU by CR at 12/20/2023 1345    Acceptance, E, VU by CR at 12/19/2023 1004    Acceptance, E,TB, VU by RM at 11/30/2023 0420    Acceptance, E,TB, VU by MOE at 11/8/2023 1341                         Point: Precautions (Done)       Learning Progress Summary             Patient Acceptance, E,D, DU by PG at 4/24/2024 1039    Acceptance, E,TB, VU by RM at 2/14/2024 0518    Acceptance, E,TB, VU by RM at 1/9/2024 0420    Acceptance, E, VU by CR at 12/30/2023 1750    Acceptance, E,TB, VU by RM at 11/30/2023 0420    Acceptance, E,TB, VU by MOE at 11/8/2023 1341                                         User Key       Initials Effective Dates Name Provider Type Discipline     06/08/21 -  Bharath Berg RN Registered Nurse Nurse    ROLY 06/13/22 -  Adislon Baker LPN Licensed Nurse Nurse    BOB 06/16/21 -  Carlo  Kodak, OT Occupational Therapist OT    MOE 06/03/21 -  Merlin Rao, PT Physical Therapist PT                  PT Recommendation and Plan  Anticipated Discharge Disposition (PT): extended care facility  Planned Therapy Interventions (PT): balance training, bed mobility training, gait training, home exercise program, stair training, strengthening, transfer training  Therapy Frequency (PT): 6 times/wk  Progress Summary (PT)  Progress Toward Functional Goals (PT): progress toward functional goals is gradual  Daily Progress Summary (PT): Pt was better able to tolerate ambulation today.  For the first time he was able to advance his RLE without manual assistance.  Will continure current plan  Barriers to Overall Progress (PT): Pt has made progress over these last 10 days.  He is able to ambulate short distances with assistance of 2 and is performing BSC and chair transfers.  Will update plan accordingly and continue current plan for 10 more days.  Plan of Care Reviewed With: patient  Progress: improving  Outcome Evaluation: Pt has made little progress since the last reevaluation.  He was instructed that he will need to show progress toward his goals by the next reevaluation date or we will be forced to d/c due to plateau.  Pt voiced understanding.  Goals below will be updated accordingly.   Outcome Measures       Row Name 05/13/24 8377             How much help from another person do you currently need...    Turning from your back to your side while in flat bed without using bedrails? 3  -WM      Moving from lying on back to sitting on the side of a flat bed without bedrails? 2  -WM      Moving to and from a bed to a chair (including a wheelchair)? 2  -WM      Standing up from a chair using your arms (e.g., wheelchair, bedside chair)? 2  -WM      Climbing 3-5 steps with a railing? 1  -WM      To walk in hospital room? 1  -WM      AM-PAC 6 Clicks Score (PT) 11  -WM      Highest Level of Mobility Goal 4 --> Transfer to  chair/commode  -                User Key  (r) = Recorded By, (t) = Taken By, (c) = Cosigned By      Initials Name Provider Type     Carson Inman, PTA Physical Therapist Assistant                     Time Calculation:    PT Charges       Row Name 05/15/24 1347 05/15/24 0733          Time Calculation    PT Received On 05/15/24  -MOE --     PT Goal Re-Cert Due Date -- 05/23/24  -MOE        Timed Charges    94441 - Gait Training Minutes  10  -MOE --     08574 - PT Therapeutic Activity Minutes 20  -MOE --        Total Minutes    Timed Charges Total Minutes 30  -MOE --      Total Minutes 30  -MOE --               User Key  (r) = Recorded By, (t) = Taken By, (c) = Cosigned By      Initials Name Provider Type    Merlin De La O, PT Physical Therapist                      PT G-Codes  Outcome Measure Options: AM-PAC 6 Clicks Daily Activity (OT), Optimal Instrument  AM-PAC 6 Clicks Score (PT): 11  AM-PAC 6 Clicks Score (OT): 14    Merlin Rao, PT  5/15/2024

## 2024-05-15 NOTE — PLAN OF CARE
Goal Outcome Evaluation:  Plan of Care Reviewed With: patient        Progress: no change     Patient is alert and oriented x 4. Patient is able to make needs known to staff. Urinal at bedside. Patient uses bedpan. Patient transferred to chair in room and back to bed with therapy. Stayed up approximately 1 hour in chair.  Patient medicated with Percocet and Baclofen x 1 this shift for left shoulder and hip pain. Wound care tolerated well. Will continue with current plan of care.

## 2024-05-15 NOTE — THERAPY TREATMENT NOTE
Patient Name: Jose Shaikh  : 1960    MRN: [id number]                              Today's Date: 5/15/2024       Admit Date: 2023    Visit Dx:     ICD-10-CM ICD-9-CM   1. Difficulty in walking  R26.2 719.7   2. Type 2 diabetes mellitus with other specified complication, unspecified whether long term insulin use  E11.69 250.80     Patient Active Problem List   Diagnosis    Diabetic ulcer of left heel associated with type 2 DM    Acute osteomyelitis of left calcaneus     Diabetic ulcer of left heel associated with type 2 DM    Diabetic ulcer of right midfoot associated with type 2 DM    Paroxysmal atrial fibrillation    Essential hypertension    Hyperlipidemia LDL goal <100    Cellulitis and abscess of foot    High alkaline phosphatase level    Osteomyelitis    Onychomycosis    Onychocryptosis    Foot pain, bilateral    Osteomyelitis of foot, right, acute    Cellulitis of right foot    Type 2 diabetes mellitus, with long-term current use of insulin    Class 3 severe obesity due to excess calories with serious comorbidity and body mass index (BMI) of 45.0 to 49.9 in adult    Anxiety disorder, unspecified    Claustrophobia    Dependence on wheelchair    Depression, unspecified    Long term (current) use of anticoagulants    Long term (current) use of oral hypoglycemic drugs    Wound of foot    Ulcer of right foot    Orthostatic hypotension    Other chronic osteomyelitis, right ankle and foot    Personal history of nicotine dependence    Thrombocytopenia, unspecified    Unspecified open wound, right foot, initial encounter    Diabetic foot infection    Subacute osteomyelitis of right foot    Right foot pain    Sepsis    Onychomycosis    Foot pain, left    Impaired mobility and ADLs    Absence of toe of right foot    Corns and callosity    Disability of walking    Fracture    Limb swelling    Polyneuropathy    Pressure ulcer, stage 1    Shortness of breath    Generalized weakness    Debility    Ulcerated,  foot, left, limited to breakdown of skin    Onychomycosis     Past Medical History:   Diagnosis Date    Absence of toe of right foot     Acute osteomyelitis of left calcaneus  08/18/2021    Anxiety and depression     Arthritis     Cancer     Chronic pain     STATES HIS PAIN IS 10/10 AAT    Claustrophobia     Corns and callus     Diabetic ulcer of left heel associated with type 2 DM 08/18/2021    Diabetic ulcer of left heel associated with type 2 DM 07/06/2021    Diabetic ulcer of right midfoot associated with type 2 DM 08/18/2021    Difficulty walking     Essential hypertension 08/31/2021    Hammertoe     Hyperlipidemia LDL goal <100 08/31/2021    Ingrown toenail     Obesity     Paroxysmal atrial fibrillation 08/31/2021    Polyneuropathy     Pressure ulcer, stage 1     Tinea unguium     Type 2 diabetes mellitus with polyneuropathy      Past Surgical History:   Procedure Laterality Date    CYST REMOVAL      center of back; benign    EYE SURGERY      INCISION AND DRAINAGE ABSCESS      back    INCISION AND DRAINAGE LEG Right 12/10/2021    Procedure: INCISION AND DRAINAGE LOWER EXTREMITY;  Surgeon: Ash Leyva DPM;  Location: [facility];  Service: Podiatry;  Laterality: Right;    OTHER SURGICAL HISTORY      Surgical clips left foot    TOE SURGERY Right     Removal of 5th toe    TRANS METATARSAL AMPUTATION Right 12/02/2021    Procedure: AMPUTATION TRANS METATARSAL;  Surgeon: Ash Leyva DPM;  Location: [facility] OR;  Service: Podiatry;  Laterality: Right;    VASCULAR SURGERY      WRIST SURGERY Left     repair of injury      General Information       Row Name 05/15/24 1251          OT Time and Intention    Document Type therapy note (daily note)  -PG     Mode of Treatment individual therapy;occupational therapy  -PG               User Key  (r) = Recorded By, (t) = Taken By, (c) = Cosigned By      Initials Name Provider Type    PG Kodak Matos, BEKAH Occupational Therapist                      Mobility/ADL's       Row Name 05/15/24 1251          Transfers    Transfers bed-chair transfer;sit-stand transfer;stand-sit transfer  -PG       Row Name 05/15/24 1251          Bed-Chair Transfer    Bed-Chair Graves (Transfers) moderate assist (50% patient effort);verbal cues;2 person assist;1 person to manage equipment  -PG       Row Name 05/15/24 1251          Sit-Stand Transfer    Sit-Stand Graves (Transfers) moderate assist (50% patient effort);2 person assist;verbal cues  -PG     Assistive Device (Sit-Stand Transfers) bariatric;walker, front-wheeled  -PG       Row Name 05/15/24 1251          Stand-Sit Transfer    Stand-Sit Graves (Transfers) moderate assist (50% patient effort);2 person assist;verbal cues  -PG     Assistive Device (Stand-Sit Transfers) bariatric;walker, front-wheeled  -PG               User Key  (r) = Recorded By, (t) = Taken By, (c) = Cosigned By      Initials Name Provider Type    Kodak Velazco, BEKAH Occupational Therapist                   Obj/Interventions    No documentation.                  Goals/Plan    No documentation.                  Clinical Impression       Row Name 05/15/24 1252          Plan of Care Review    Progress no change  -PG               User Key  (r) = Recorded By, (t) = Taken By, (c) = Cosigned By      Initials Name Provider Type    PG Kodak Matos, BEKAH Occupational Therapist                   Outcome Measures       Row Name 05/15/24 1252          How much help from another is currently needed...    Putting on and taking off regular lower body clothing? 1  -PG     Bathing (including washing, rinsing, and drying) 2  -PG     Toileting (which includes using toilet bed pan or urinal) 1  -PG     Putting on and taking off regular upper body clothing 3  -PG     Taking care of personal grooming (such as brushing teeth) 3  -PG     Eating meals 4  -PG     AM-PAC 6 Clicks Score (OT) 14  -PG       Row Name 05/15/24 1100 05/15/24 0300       How much help from  another person do you currently need...    Turning from your back to your side while in flat bed without using bedrails? 3  -SM 3  -FM    Moving from lying on back to sitting on the side of a flat bed without bedrails? 2  -SM 2  -FM    Moving to and from a bed to a chair (including a wheelchair)? 2  -SM 2  -FM    Standing up from a chair using your arms (e.g., wheelchair, bedside chair)? 2  -SM 2  -FM    Climbing 3-5 steps with a railing? 1  -SM 1  -FM    To walk in hospital room? 1  -SM 1  -FM    AM-PAC 6 Clicks Score (PT) 11  -SM 11  -FM    Highest Level of Mobility Goal 4 --> Transfer to chair/commode  -SM 4 --> Transfer to chair/commode  -FM      Row Name 05/15/24 1252          Functional Assessment    Outcome Measure Options AM-PAC 6 Clicks Daily Activity (OT);Optimal Instrument  -PG       Row Name 05/15/24 1252          Optimal Instrument    Optimal Instrument Optimal - 3  -PG     Bending/Stooping 4  -PG     Standing 4  -PG     Reaching 2  -PG               User Key  (r) = Recorded By, (t) = Taken By, (c) = Cosigned By      Initials Name Provider Type    FM Patsy Cano, RN Registered Nurse    Kodak Velazco OT Occupational Therapist    Halle Canales, RN Registered Nurse                    Occupational Therapy Education       Title: PT OT SLP Therapies (Done)       Topic: Occupational Therapy (Done)       Point: ADL training (Done)       Description:   Instruct learner(s) on proper safety adaptation and remediation techniques during self care or transfers.   Instruct in proper use of assistive devices.                  Learning Progress Summary             Patient Acceptance, E,D, DU by PG at 4/24/2024 1039    Acceptance, E,TB, VU by RM at 2/14/2024 0518    Acceptance, E,TB, VU by RM at 1/9/2024 0420    Acceptance, E, VU by CR at 12/30/2023 1750    Acceptance, E,TB, VU by RM at 11/30/2023 0420    Acceptance, E,D, DU by PG at 11/7/2023 0932                         Point: Home exercise program (Done)        Description:   Instruct learner(s) on appropriate technique for monitoring, assisting and/or progressing therapeutic exercises/activities.                  Learning Progress Summary             Patient Acceptance, E,D, DU by PG at 4/24/2024 1039    Acceptance, E,TB, VU by RM at 2/14/2024 0518    Acceptance, E,TB, VU by RM at 1/9/2024 0420    Acceptance, E, VU by CR at 12/30/2023 1750    Acceptance, E,TB, VU by RM at 11/30/2023 0420    Acceptance, E,D, DU by PG at 11/7/2023 0932                         Point: Precautions (Done)       Description:   Instruct learner(s) on prescribed precautions during self-care and functional transfers.                  Learning Progress Summary             Patient Acceptance, E,D, DU by PG at 4/24/2024 1039    Acceptance, E,TB, VU by RM at 2/14/2024 0518    Acceptance, E,TB, VU by RM at 1/9/2024 0420    Acceptance, E, VU by CR at 12/30/2023 1750    Acceptance, E,TB, VU by RM at 11/30/2023 0420    Acceptance, E,D, DU by PG at 11/7/2023 0932                         Point: Body mechanics (Done)       Description:   Instruct learner(s) on proper positioning and spine alignment during self-care, functional mobility activities and/or exercises.                  Learning Progress Summary             Patient Acceptance, E,D, DU by PG at 4/24/2024 1039    Acceptance, E,TB, VU by RM at 2/14/2024 0518    Acceptance, E,TB, VU by RM at 1/9/2024 0420    Acceptance, E, VU by CR at 12/30/2023 1750    Acceptance, E,TB, VU by RM at 11/30/2023 0420    Acceptance, E,D, DU by PG at 11/7/2023 0932                                         User Key       Initials Effective Dates Name Provider Type Discipline     06/08/21 -  Bharath Berg, RN Registered Nurse Nurse    CR 06/13/22 -  Adilson Baker LPN Licensed Nurse Nurse    PG 06/16/21 -  Kodak Matos OT Occupational Therapist OT                  OT Recommendation and Plan  Planned Therapy Interventions (OT): activity tolerance training, BADL  retraining, transfer/mobility retraining, patient/caregiver education/training, occupation/activity based interventions, strengthening exercise  Therapy Frequency (OT): 5 times/wk  Plan of Care Review  Plan of Care Reviewed With: patient  Progress: no change  Outcome Evaluation: [place] [facility] [place]  Continue OT services per plan of care     Time Calculation:   Evaluation Complexity (OT)  Review Occupational Profile/Medical/Therapy History Complexity: brief/low complexity  Assessment, Occupational Performance/Identification of Deficit Complexity: 3-5 performance deficits  Clinical Decision Making Complexity (OT): problem focused assessment/low complexity  Overall Complexity of Evaluation (OT): low complexity     Time Calculation- OT       Row Name 05/15/24 1254             Time Calculation- OT    OT Received On 05/15/24  -PG      OT Goal Re-Cert Due Date 05/23/24  -PG         Timed Charges    25569 - OT Therapeutic Activity Minutes 45  -PG         SNF Occupational Therapy Minutes    Skilled Minutes- OT 45 min  -PG         Total Minutes    Timed Charges Total Minutes 45  -PG       Total Minutes 45  -PG                User Key  (r) = Recorded By, (t) = Taken By, (c) = Cosigned By      Initials Name Provider Type    PG Kodak Matos OT Occupational Therapist                  Therapy Charges for Today       Code Description Service Date Service Provider Modifiers Qty    01281505698 HC OT THERAPEUTIC ACT EA 15 MIN 5/15/2024 Kodak Matos OT GO 3                 Kodak Matos OT  5/15/2024

## 2024-05-16 LAB
GLUCOSE BLDC GLUCOMTR-MCNC: 108 MG/DL (ref 70–99)
GLUCOSE BLDC GLUCOMTR-MCNC: 128 MG/DL (ref 70–99)
GLUCOSE BLDC GLUCOMTR-MCNC: 128 MG/DL (ref 70–99)
GLUCOSE BLDC GLUCOMTR-MCNC: 136 MG/DL (ref 70–99)

## 2024-05-16 PROCEDURE — 63710000001 INSULIN DETEMIR PER 5 UNITS: Performed by: PHYSICIAN ASSISTANT

## 2024-05-16 PROCEDURE — 82948 REAGENT STRIP/BLOOD GLUCOSE: CPT

## 2024-05-16 PROCEDURE — 82948 REAGENT STRIP/BLOOD GLUCOSE: CPT | Performed by: INTERNAL MEDICINE

## 2024-05-16 PROCEDURE — 63710000001 ONDANSETRON ODT 4 MG TABLET DISPERSIBLE: Performed by: INTERNAL MEDICINE

## 2024-05-16 RX ADMIN — PREGABALIN 100 MG: 100 CAPSULE ORAL at 15:49

## 2024-05-16 RX ADMIN — Medication 500 MG: at 09:56

## 2024-05-16 RX ADMIN — OXYCODONE HYDROCHLORIDE AND ACETAMINOPHEN 1 TABLET: 10; 325 TABLET ORAL at 20:07

## 2024-05-16 RX ADMIN — Medication 500 MG: at 22:28

## 2024-05-16 RX ADMIN — BACLOFEN 10 MG: 10 TABLET ORAL at 02:21

## 2024-05-16 RX ADMIN — PREGABALIN 100 MG: 100 CAPSULE ORAL at 09:56

## 2024-05-16 RX ADMIN — ONDANSETRON 4 MG: 4 TABLET, ORALLY DISINTEGRATING ORAL at 07:37

## 2024-05-16 RX ADMIN — OXYCODONE HYDROCHLORIDE AND ACETAMINOPHEN 1 TABLET: 10; 325 TABLET ORAL at 02:21

## 2024-05-16 RX ADMIN — APIXABAN 5 MG: 5 TABLET, FILM COATED ORAL at 22:27

## 2024-05-16 RX ADMIN — SERTRALINE HYDROCHLORIDE 50 MG: 50 TABLET ORAL at 22:42

## 2024-05-16 RX ADMIN — IBUPROFEN 400 MG: 400 TABLET ORAL at 13:28

## 2024-05-16 RX ADMIN — Medication 10 MG: at 22:28

## 2024-05-16 RX ADMIN — DICLOFENAC SODIUM 4 G: 10 GEL TOPICAL at 19:29

## 2024-05-16 RX ADMIN — PREGABALIN 100 MG: 100 CAPSULE ORAL at 22:25

## 2024-05-16 RX ADMIN — APIXABAN 5 MG: 5 TABLET, FILM COATED ORAL at 09:56

## 2024-05-16 RX ADMIN — CETIRIZINE HYDROCHLORIDE 10 MG: 10 TABLET, FILM COATED ORAL at 09:55

## 2024-05-16 RX ADMIN — OXYCODONE HYDROCHLORIDE AND ACETAMINOPHEN 1 TABLET: 10; 325 TABLET ORAL at 11:00

## 2024-05-16 RX ADMIN — BACLOFEN 10 MG: 10 TABLET ORAL at 11:00

## 2024-05-16 RX ADMIN — BACLOFEN 10 MG: 10 TABLET ORAL at 20:07

## 2024-05-16 RX ADMIN — INSULIN DETEMIR 40 UNITS: 100 INJECTION, SOLUTION SUBCUTANEOUS at 22:32

## 2024-05-16 RX ADMIN — FUROSEMIDE 40 MG: 40 TABLET ORAL at 09:56

## 2024-05-16 RX ADMIN — LISINOPRIL 2.5 MG: 2.5 TABLET ORAL at 09:56

## 2024-05-16 RX ADMIN — ATORVASTATIN CALCIUM 10 MG: 10 TABLET, FILM COATED ORAL at 22:28

## 2024-05-16 RX ADMIN — DICLOFENAC SODIUM 4 G: 10 GEL TOPICAL at 13:28

## 2024-05-16 RX ADMIN — DICLOFENAC SODIUM 4 G: 10 GEL TOPICAL at 22:29

## 2024-05-16 RX ADMIN — INSULIN DETEMIR 40 UNITS: 100 INJECTION, SOLUTION SUBCUTANEOUS at 09:55

## 2024-05-16 RX ADMIN — DICLOFENAC SODIUM 4 G: 10 GEL TOPICAL at 09:58

## 2024-05-16 RX ADMIN — POLYETHYLENE GLYCOL 400 AND PROPYLENE GLYCOL 1 DROP: 4; 3 SOLUTION/ DROPS OPHTHALMIC at 13:28

## 2024-05-16 RX ADMIN — EMPAGLIFLOZIN 10 MG: 10 TABLET, FILM COATED ORAL at 09:56

## 2024-05-16 NOTE — PLAN OF CARE
Goal Outcome Evaluation:           Progress: no change  Outcome Evaluation: Rsd. is alert and oriented x4, able to make needs known to staff.  Medicated with prn Oxycodone, and Motrin this shift, See emar.  Rsd. states medications effective.  Activity encouraged, but did not get out of bed.  Trapeze bar overhead, Rsd. about to reposition and weight shift, but refuses to turn.  Rsd. is incontinent of bowel at times, urinal at bedside.  Call light and personal items within reach.  Rsd. reminded to use call light for assistance, verbalizes understanding.  Will continue to monitor and notify on-coming staff.  Current plan of care remains in place at this time.

## 2024-05-16 NOTE — PLAN OF CARE
Goal Outcome Evaluation:  Plan of Care Reviewed With: patient        Progress: no change  Outcome Evaluation: resident alert, oriented, able to make needs known to staff. Transfers with assist of 2-3. refused to get up today, stated always feels really sick the day after he gets the ozempic shot. Blood glucose stable for all 3 meals, resident continues with bowel incontinence as well as continence via bedpan. Uses urinal independently at bedside. Resident pleasant and cooperative.

## 2024-05-17 LAB
GLUCOSE BLDC GLUCOMTR-MCNC: 104 MG/DL (ref 70–99)
GLUCOSE BLDC GLUCOMTR-MCNC: 122 MG/DL (ref 70–99)
GLUCOSE BLDC GLUCOMTR-MCNC: 137 MG/DL (ref 70–99)
GLUCOSE BLDC GLUCOMTR-MCNC: 160 MG/DL (ref 70–99)

## 2024-05-17 PROCEDURE — 82948 REAGENT STRIP/BLOOD GLUCOSE: CPT | Performed by: INTERNAL MEDICINE

## 2024-05-17 PROCEDURE — 97530 THERAPEUTIC ACTIVITIES: CPT

## 2024-05-17 PROCEDURE — 63710000001 INSULIN DETEMIR PER 5 UNITS: Performed by: PHYSICIAN ASSISTANT

## 2024-05-17 PROCEDURE — 82948 REAGENT STRIP/BLOOD GLUCOSE: CPT

## 2024-05-17 PROCEDURE — 97116 GAIT TRAINING THERAPY: CPT

## 2024-05-17 PROCEDURE — 63710000001 INSULIN LISPRO (HUMAN) PER 5 UNITS: Performed by: INTERNAL MEDICINE

## 2024-05-17 RX ADMIN — OXYCODONE HYDROCHLORIDE AND ACETAMINOPHEN 1 TABLET: 10; 325 TABLET ORAL at 13:33

## 2024-05-17 RX ADMIN — POLYETHYLENE GLYCOL 400 AND PROPYLENE GLYCOL 1 DROP: 4; 3 SOLUTION/ DROPS OPHTHALMIC at 02:46

## 2024-05-17 RX ADMIN — PREGABALIN 100 MG: 100 CAPSULE ORAL at 08:00

## 2024-05-17 RX ADMIN — PREGABALIN 100 MG: 100 CAPSULE ORAL at 16:39

## 2024-05-17 RX ADMIN — Medication 500 MG: at 21:05

## 2024-05-17 RX ADMIN — PREGABALIN 100 MG: 100 CAPSULE ORAL at 21:05

## 2024-05-17 RX ADMIN — HYDROCORTISONE 2.5%: 25 CREAM TOPICAL at 21:13

## 2024-05-17 RX ADMIN — BACLOFEN 10 MG: 10 TABLET ORAL at 21:40

## 2024-05-17 RX ADMIN — OXYCODONE HYDROCHLORIDE AND ACETAMINOPHEN 1 TABLET: 10; 325 TABLET ORAL at 21:40

## 2024-05-17 RX ADMIN — BACLOFEN 10 MG: 10 TABLET ORAL at 05:32

## 2024-05-17 RX ADMIN — FERROUS SULFATE TAB 325 MG (65 MG ELEMENTAL FE) 325 MG: 325 (65 FE) TAB at 08:00

## 2024-05-17 RX ADMIN — INSULIN LISPRO 4 UNITS: 100 INJECTION, SOLUTION INTRAVENOUS; SUBCUTANEOUS at 11:37

## 2024-05-17 RX ADMIN — ACETAMINOPHEN 650 MG: 325 TABLET ORAL at 19:56

## 2024-05-17 RX ADMIN — APIXABAN 5 MG: 5 TABLET, FILM COATED ORAL at 08:00

## 2024-05-17 RX ADMIN — BACLOFEN 10 MG: 10 TABLET ORAL at 13:33

## 2024-05-17 RX ADMIN — ACETAMINOPHEN 650 MG: 325 TABLET ORAL at 07:42

## 2024-05-17 RX ADMIN — FUROSEMIDE 40 MG: 40 TABLET ORAL at 08:00

## 2024-05-17 RX ADMIN — ATORVASTATIN CALCIUM 10 MG: 10 TABLET, FILM COATED ORAL at 21:06

## 2024-05-17 RX ADMIN — POLYETHYLENE GLYCOL 400 AND PROPYLENE GLYCOL 1 DROP: 4; 3 SOLUTION/ DROPS OPHTHALMIC at 15:02

## 2024-05-17 RX ADMIN — OXYCODONE HYDROCHLORIDE AND ACETAMINOPHEN 1 TABLET: 10; 325 TABLET ORAL at 05:32

## 2024-05-17 RX ADMIN — SERTRALINE HYDROCHLORIDE 50 MG: 50 TABLET ORAL at 21:06

## 2024-05-17 RX ADMIN — ACETAMINOPHEN 650 MG: 325 TABLET ORAL at 02:42

## 2024-05-17 RX ADMIN — INSULIN DETEMIR 40 UNITS: 100 INJECTION, SOLUTION SUBCUTANEOUS at 08:00

## 2024-05-17 RX ADMIN — DICLOFENAC SODIUM 4 G: 10 GEL TOPICAL at 07:57

## 2024-05-17 RX ADMIN — IBUPROFEN 400 MG: 400 TABLET ORAL at 07:42

## 2024-05-17 RX ADMIN — LISINOPRIL 2.5 MG: 2.5 TABLET ORAL at 08:00

## 2024-05-17 RX ADMIN — Medication 500 MG: at 08:00

## 2024-05-17 RX ADMIN — DICLOFENAC SODIUM 4 G: 10 GEL TOPICAL at 18:20

## 2024-05-17 RX ADMIN — DICLOFENAC SODIUM 4 G: 10 GEL TOPICAL at 21:03

## 2024-05-17 RX ADMIN — EMPAGLIFLOZIN 10 MG: 10 TABLET, FILM COATED ORAL at 08:00

## 2024-05-17 RX ADMIN — CETIRIZINE HYDROCHLORIDE 10 MG: 10 TABLET, FILM COATED ORAL at 08:00

## 2024-05-17 RX ADMIN — APIXABAN 5 MG: 5 TABLET, FILM COATED ORAL at 21:06

## 2024-05-17 RX ADMIN — CYANOCOBALAMIN TAB 500 MCG 1000 MCG: 500 TAB at 08:00

## 2024-05-17 RX ADMIN — Medication 10 MG: at 21:06

## 2024-05-17 RX ADMIN — DICLOFENAC SODIUM 4 G: 10 GEL TOPICAL at 11:38

## 2024-05-17 RX ADMIN — INSULIN DETEMIR 40 UNITS: 100 INJECTION, SOLUTION SUBCUTANEOUS at 21:03

## 2024-05-17 NOTE — THERAPY TREATMENT NOTE
Patient Name: Jose Shaikh  : 1960    MRN: [id number]                              Today's Date: 2024       Admit Date: 2023    Visit Dx:     ICD-10-CM ICD-9-CM   1. Difficulty in walking  R26.2 719.7   2. Type 2 diabetes mellitus with other specified complication, unspecified whether long term insulin use  E11.69 250.80     Patient Active Problem List   Diagnosis    Diabetic ulcer of left heel associated with type 2 DM    Acute osteomyelitis of left calcaneus     Diabetic ulcer of left heel associated with type 2 DM    Diabetic ulcer of right midfoot associated with type 2 DM    Paroxysmal atrial fibrillation    Essential hypertension    Hyperlipidemia LDL goal <100    Cellulitis and abscess of foot    High alkaline phosphatase level    Osteomyelitis    Onychomycosis    Onychocryptosis    Foot pain, bilateral    Osteomyelitis of foot, right, acute    Cellulitis of right foot    Type 2 diabetes mellitus, with long-term current use of insulin    Class 3 severe obesity due to excess calories with serious comorbidity and body mass index (BMI) of 45.0 to 49.9 in adult    Anxiety disorder, unspecified    Claustrophobia    Dependence on wheelchair    Depression, unspecified    Long term (current) use of anticoagulants    Long term (current) use of oral hypoglycemic drugs    Wound of foot    Ulcer of right foot    Orthostatic hypotension    Other chronic osteomyelitis, right ankle and foot    Personal history of nicotine dependence    Thrombocytopenia, unspecified    Unspecified open wound, right foot, initial encounter    Diabetic foot infection    Subacute osteomyelitis of right foot    Right foot pain    Sepsis    Onychomycosis    Foot pain, left    Impaired mobility and ADLs    Absence of toe of right foot    Corns and callosity    Disability of walking    Fracture    Limb swelling    Polyneuropathy    Pressure ulcer, stage 1    Shortness of breath    Generalized weakness    Debility    Ulcerated,  foot, left, limited to breakdown of skin    Onychomycosis     Past Medical History:   Diagnosis Date    Absence of toe of right foot     Acute osteomyelitis of left calcaneus  08/18/2021    Anxiety and depression     Arthritis     Cancer     Chronic pain     STATES HIS PAIN IS 10/10 AAT    Claustrophobia     Corns and callus     Diabetic ulcer of left heel associated with type 2 DM 08/18/2021    Diabetic ulcer of left heel associated with type 2 DM 07/06/2021    Diabetic ulcer of right midfoot associated with type 2 DM 08/18/2021    Difficulty walking     Essential hypertension 08/31/2021    Hammertoe     Hyperlipidemia LDL goal <100 08/31/2021    Ingrown toenail     Obesity     Paroxysmal atrial fibrillation 08/31/2021    Polyneuropathy     Pressure ulcer, stage 1     Tinea unguium     Type 2 diabetes mellitus with polyneuropathy      Past Surgical History:   Procedure Laterality Date    CYST REMOVAL      center of back; benign    EYE SURGERY      INCISION AND DRAINAGE ABSCESS      back    INCISION AND DRAINAGE LEG Right 12/10/2021    Procedure: INCISION AND DRAINAGE LOWER EXTREMITY;  Surgeon: Ash Leyva DPM;  Location: [facility];  Service: Podiatry;  Laterality: Right;    OTHER SURGICAL HISTORY      Surgical clips left foot    TOE SURGERY Right     Removal of 5th toe    TRANS METATARSAL AMPUTATION Right 12/02/2021    Procedure: AMPUTATION TRANS METATARSAL;  Surgeon: Ash Leyva DPM;  Location: [facility] OR;  Service: Podiatry;  Laterality: Right;    VASCULAR SURGERY      WRIST SURGERY Left     repair of injury      General Information       Row Name 05/17/24 1029          OT Time and Intention    Document Type therapy note (daily note)  -PG     Mode of Treatment individual therapy;occupational therapy  -PG               User Key  (r) = Recorded By, (t) = Taken By, (c) = Cosigned By      Initials Name Provider Type    PG Kodak Matos, BEKAH Occupational Therapist                      Mobility/ADL's       Row Name 05/17/24 1029          Sit-Stand Transfer    Sit-Stand Cuming (Transfers) moderate assist (50% patient effort);2 person assist;minimum assist (75% patient effort)  -PG     Assistive Device (Sit-Stand Transfers) walker, front-wheeled  -PG       Row Name 05/17/24 1029          Stand-Sit Transfer    Stand-Sit Cuming (Transfers) moderate assist (50% patient effort);2 person assist;verbal cues;minimum assist (75% patient effort)  -PG               User Key  (r) = Recorded By, (t) = Taken By, (c) = Cosigned By      Initials Name Provider Type    PG Kodak Matos, OT Occupational Therapist                   Obj/Interventions    No documentation.                  Goals/Plan    No documentation.                  Clinical Impression       Row Name 05/17/24 1029          Plan of Care Review    Progress improving  -PG     Outcome Evaluation Patient able to walk approximately 6 feet with 2 person assist and bariatric walker for safety.  1 sitting rest break needed at the residential point.  Patient appears motivated and ready to continue his occupational therapy to help maximize and enhance ADL performance.  -PG               User Key  (r) = Recorded By, (t) = Taken By, (c) = Cosigned By      Initials Name Provider Type    PG Kodak Matos, OT Occupational Therapist                   Outcome Measures       Row Name 05/17/24 1031          How much help from another is currently needed...    Putting on and taking off regular lower body clothing? 1  -PG     Bathing (including washing, rinsing, and drying) 2  -PG     Toileting (which includes using toilet bed pan or urinal) 1  -PG     Putting on and taking off regular upper body clothing 3  -PG     Taking care of personal grooming (such as brushing teeth) 3  -PG     Eating meals 4  -PG     AM-PAC 6 Clicks Score (OT) 14  -PG       Row Name 05/17/24 1031          Functional Assessment    Outcome Measure Options AM-PAC 6 Clicks Daily Activity  (OT);Optimal Instrument  -PG       Row Name 05/17/24 1031          Optimal Instrument    Optimal Instrument Optimal - 3  -PG     Bending/Stooping 4  -PG     Standing 3  -PG     Reaching 2  -PG               User Key  (r) = Recorded By, (t) = Taken By, (c) = Cosigned By      Initials Name Provider Type    PG Kodak Matos OT Occupational Therapist                    Occupational Therapy Education       Title: PT OT SLP Therapies (Done)       Topic: Occupational Therapy (Done)       Point: ADL training (Done)       Description:   Instruct learner(s) on proper safety adaptation and remediation techniques during self care or transfers.   Instruct in proper use of assistive devices.                  Learning Progress Summary             Patient Acceptance, E,D, DU by PG at 4/24/2024 1039    Acceptance, E,TB, VU by RM at 2/14/2024 0518    Acceptance, E,TB, VU by RM at 1/9/2024 0420    Acceptance, E, VU by CR at 12/30/2023 1750    Acceptance, E,TB, VU by RM at 11/30/2023 0420    Acceptance, E,D, DU by PG at 11/7/2023 0932                         Point: Home exercise program (Done)       Description:   Instruct learner(s) on appropriate technique for monitoring, assisting and/or progressing therapeutic exercises/activities.                  Learning Progress Summary             Patient Acceptance, E,D, DU by PG at 4/24/2024 1039    Acceptance, E,TB, VU by RM at 2/14/2024 0518    Acceptance, E,TB, VU by RM at 1/9/2024 0420    Acceptance, E, VU by CR at 12/30/2023 1750    Acceptance, E,TB, VU by RM at 11/30/2023 0420    Acceptance, E,D, DU by PG at 11/7/2023 0932                         Point: Precautions (Done)       Description:   Instruct learner(s) on prescribed precautions during self-care and functional transfers.                  Learning Progress Summary             Patient Acceptance, E,D, DU by PG at 4/24/2024 1039    Acceptance, E,TB, VU by RM at 2/14/2024 0518    Acceptance, E,TB, VU by RM at 1/9/2024 0420     Acceptance, E, VU by CR at 12/30/2023 1750    Acceptance, E,TB, VU by RM at 11/30/2023 0420    Acceptance, E,D, DU by PG at 11/7/2023 0932                         Point: Body mechanics (Done)       Description:   Instruct learner(s) on proper positioning and spine alignment during self-care, functional mobility activities and/or exercises.                  Learning Progress Summary             Patient Acceptance, E,D, DU by PG at 4/24/2024 1039    Acceptance, E,TB, VU by RM at 2/14/2024 0518    Acceptance, E,TB, VU by RM at 1/9/2024 0420    Acceptance, E, VU by CR at 12/30/2023 1750    Acceptance, E,TB, VU by RM at 11/30/2023 0420    Acceptance, E,D, DU by PG at 11/7/2023 0932                                         User Key       Initials Effective Dates Name Provider Type Discipline     06/08/21 -  Bharath Berg RN Registered Nurse Nurse    CR 06/13/22 -  Adilson Baker LPN Licensed Nurse Nurse     06/16/21 -  oKdak Matos OT Occupational Therapist OT                  OT Recommendation and Plan  Planned Therapy Interventions (OT): activity tolerance training, BADL retraining, transfer/mobility retraining, patient/caregiver education/training, occupation/activity based interventions, strengthening exercise  Therapy Frequency (OT): 5 times/wk  Plan of Care Review  Plan of Care Reviewed With: patient  Progress: improving  Outcome Evaluation: Patient able to walk approximately 6 feet with 2 person assist and bariatric walker for safety.  1 sitting rest break needed at the alf point.  Patient appears motivated and ready to continue his occupational therapy to help maximize and enhance ADL performance.     Time Calculation:   Evaluation Complexity (OT)  Review Occupational Profile/Medical/Therapy History Complexity: brief/low complexity  Assessment, Occupational Performance/Identification of Deficit Complexity: 3-5 performance deficits  Clinical Decision Making Complexity (OT): problem focused assessment/low  complexity  Overall Complexity of Evaluation (OT): low complexity     Time Calculation- OT       Row Name 05/17/24 1032             Time Calculation- OT    OT Received On 05/17/24  -PG      OT Goal Re-Cert Due Date 05/23/24  -PG         Timed Charges    55244 - OT Therapeutic Activity Minutes 30  -PG         SNF Occupational Therapy Minutes    Skilled Minutes- OT 30 min  -PG         Total Minutes    Timed Charges Total Minutes 30  -PG       Total Minutes 30  -PG                User Key  (r) = Recorded By, (t) = Taken By, (c) = Cosigned By      Initials Name Provider Type    PG Kodak Matos OT Occupational Therapist                  Therapy Charges for Today       Code Description Service Date Service Provider Modifiers Qty    73342863905 HC OT THERAPEUTIC ACT EA 15 MIN 5/17/2024 Kodak Matos OT GO 2                 Kodak Matos OT  5/17/2024

## 2024-05-17 NOTE — PLAN OF CARE
Goal Outcome Evaluation:  Plan of Care Reviewed With: patient        Progress: improving  Outcome Evaluation: Resident alert, oriented, able t omake needs known to staff. Transfers with assist of 3 to chair/bed. worked with therapy today, got up to chair, ambulated 6 feet with therapy. blood sugar stable for breakfast and dinner. Elevated at lunch, covered with sliding scale. Medicated with prn baclofen and percocet x1 this shift, medicated with prn tylenol and ibuprofen x1, effective. Resident pleasant and cooperative

## 2024-05-17 NOTE — THERAPY TREATMENT NOTE
SNF - Physical Therapy Treatment Note  KEO Dominguez     Patient Name: Jose Shaikh  : 1960  MRN: [id number]  Today's Date: 2024      Visit Dx:    ICD-10-CM ICD-9-CM   1. Difficulty in walking  R26.2 719.7   2. Type 2 diabetes mellitus with other specified complication, unspecified whether long term insulin use  E11.69 250.80     Patient Active Problem List   Diagnosis    Diabetic ulcer of left heel associated with type 2 DM    Acute osteomyelitis of left calcaneus     Diabetic ulcer of left heel associated with type 2 DM    Diabetic ulcer of right midfoot associated with type 2 DM    Paroxysmal atrial fibrillation    Essential hypertension    Hyperlipidemia LDL goal <100    Cellulitis and abscess of foot    High alkaline phosphatase level    Osteomyelitis    Onychomycosis    Onychocryptosis    Foot pain, bilateral    Osteomyelitis of foot, right, acute    Cellulitis of right foot    Type 2 diabetes mellitus, with long-term current use of insulin    Class 3 severe obesity due to excess calories with serious comorbidity and body mass index (BMI) of 45.0 to 49.9 in adult    Anxiety disorder, unspecified    Claustrophobia    Dependence on wheelchair    Depression, unspecified    Long term (current) use of anticoagulants    Long term (current) use of oral hypoglycemic drugs    Wound of foot    Ulcer of right foot    Orthostatic hypotension    Other chronic osteomyelitis, right ankle and foot    Personal history of nicotine dependence    Thrombocytopenia, unspecified    Unspecified open wound, right foot, initial encounter    Diabetic foot infection    Subacute osteomyelitis of right foot    Right foot pain    Sepsis    Onychomycosis    Foot pain, left    Impaired mobility and ADLs    Absence of toe of right foot    Corns and callosity    Disability of walking    Fracture    Limb swelling    Polyneuropathy    Pressure ulcer, stage 1    Shortness of breath    Generalized weakness    Debility    Ulcerated, foot,  left, limited to breakdown of skin    Onychomycosis     Past Medical History:   Diagnosis Date    Absence of toe of right foot     Acute osteomyelitis of left calcaneus  08/18/2021    Anxiety and depression     Arthritis     Cancer     Chronic pain     STATES HIS PAIN IS 10/10 AAT    Claustrophobia     Corns and callus     Diabetic ulcer of left heel associated with type 2 DM 08/18/2021    Diabetic ulcer of left heel associated with type 2 DM 07/06/2021    Diabetic ulcer of right midfoot associated with type 2 DM 08/18/2021    Difficulty walking     Essential hypertension 08/31/2021    Hammertoe     Hyperlipidemia LDL goal <100 08/31/2021    Ingrown toenail     Obesity     Paroxysmal atrial fibrillation 08/31/2021    Polyneuropathy     Pressure ulcer, stage 1     Tinea unguium     Type 2 diabetes mellitus with polyneuropathy      Past Surgical History:   Procedure Laterality Date    CYST REMOVAL      center of back; benign    EYE SURGERY      INCISION AND DRAINAGE ABSCESS      back    INCISION AND DRAINAGE LEG Right 12/10/2021    Procedure: INCISION AND DRAINAGE LOWER EXTREMITY;  Surgeon: Ash Leyva DPM;  Location: [facility];  Service: Podiatry;  Laterality: Right;    OTHER SURGICAL HISTORY      Surgical clips left foot    TOE SURGERY Right     Removal of 5th toe    TRANS METATARSAL AMPUTATION Right 12/02/2021    Procedure: AMPUTATION TRANS METATARSAL;  Surgeon: Ash Leyva DPM;  Location: [facility];  Service: Podiatry;  Laterality: Right;    VASCULAR SURGERY      WRIST SURGERY Left     repair of injury       PT Assessment (Last 12 Hours)       PT Evaluation and Treatment       Row Name 05/17/24 1235          Physical Therapy Time and Intention    Document Type therapy note (daily note)  -WM     Mode of Treatment individual therapy;physical therapy  -WM     Patient Effort good  -WM     Symptoms Noted During/After Treatment fatigue  -WM       Row Name 05/17/24 1239          Bed  Mobility    Supine-Sit [place] (Bed Mobility) minimum assist (75% patient effort);1 person assist  -WM     Assistive Device (Bed Mobility) bed rails;draw sheet;head of bed elevated;overhead trapeze  -WM       Row Name 05/17/24 1239          Sit-Stand Transfer    Sit-Stand [place] (Transfers) moderate assist (50% patient effort);verbal cues;2 person assist  -WM     Assistive Device (Sit-Stand Transfers) bariatric;walker, front-wheeled  -WM       Row Name 05/17/24 1239          Stand-Sit Transfer    Stand-Sit [place] (Transfers) moderate assist (50% patient effort);verbal cues;2 person assist  -WM     Assistive Device (Stand-Sit Transfers) bariatric;walker, front-wheeled  -WM       Row Name 05/17/24 1239          Gait/Stairs (Locomotion)    [place] Level (Gait) minimum assist (75% patient effort);moderate assist (50% patient effort);2 person assist;verbal cues  -WM     Assistive Device (Gait) bariatric equipment;walker, front-wheeled  -WM     Distance in Feet (Gait) 3  x 2  -WM     Pattern (Gait) 4-point;step-to  -WM     Deviations/Abnormal Patterns (Gait) gait speed decreased;stride length decreased  -WM     Bilateral Gait Deviations --  Narrow base of support  -WM       Row Name 05/17/24 1239          Safety Issues, Functional Mobility    Impairments Affecting Function (Mobility) balance;endurance/activity tolerance;strength  -WM       Row Name             Wound 03/27/24 1045 Right anterior Skin Tear    Wound - Properties Group Placement Date: 03/27/24  -CR Placement Time: 1045  -CR Side: Right  -CR Orientation: anterior  -CR Primary Wound Type: Skin tear  -CR    Retired Wound - Properties Group Placement Date: 03/27/24  -CR Placement Time: 1045  -CR Side: Right  -CR Orientation: anterior  -CR Primary Wound Type: Skin tear  -CR    Retired Wound - Properties Group Date first assessed: 03/27/24  -CR Time first assessed: 1045  -CR Side: Right  -CR Primary Wound Type: Skin tear  -CR      Row Name              Wound 04/03/24 1730 Right anterior hip MASD (Moisture associated skin damage)    Wound - Properties Group Placement Date: 04/03/24  -NM Placement Time: 1730  -NM Side: Right  -NM Orientation: anterior  -NM Location: hip  -NM Primary Wound Type: MASD  -NM    Retired Wound - Properties Group Placement Date: 04/03/24  -NM Placement Time: 1730  -NM Side: Right  -NM Orientation: anterior  -NM Location: hip  -NM Primary Wound Type: MASD  -NM    Retired Wound - Properties Group Date first assessed: 04/03/24  -NM Time first assessed: 1730  -NM Side: Right  -NM Location: hip  -NM Primary Wound Type: MASD  -NM      Row Name             Wound 05/02/24 1033 Left posterior plantar    Wound - Properties Group Placement Date: 05/02/24  -KR Placement Time: 1033  -KR Side: Left  -KR Orientation: posterior  -KR Location: plantar  -KR    Retired Wound - Properties Group Placement Date: 05/02/24  -KR Placement Time: 1033  -KR Side: Left  -KR Orientation: posterior  -KR Location: plantar  -KR    Retired Wound - Properties Group Date first assessed: 05/02/24  -KR Time first assessed: 1033  -KR Side: Left  -KR Location: plantar  -KR      Row Name             Wound 05/09/24 2010 Right lateral abdomen Abrasion    Wound - Properties Group Placement Date: 05/09/24  -TM Placement Time: 2010 -TM Side: Right  -TM Orientation: lateral  -TM Location: abdomen  -TM Primary Wound Type: Abrasion  -TM    Retired Wound - Properties Group Placement Date: 05/09/24  -TM Placement Time: 2010 -TM Side: Right  -TM Orientation: lateral  -TM Location: abdomen  -TM Primary Wound Type: Abrasion  -TM    Retired Wound - Properties Group Date first assessed: 05/09/24  -TM Time first assessed: 2010  -TM Side: Right  -TM Location: abdomen  -TM Primary Wound Type: Abrasion  -TM      Row Name 05/17/24 1239          Positioning and Restraints    Pre-Treatment Position in bed  -WM     Post Treatment Position bed  -WM     In Bed fowlers;call light within  reach;encouraged to call for assist  -WM       Row Name 05/17/24 1239          Progress Summary (PT)    Progress Toward Functional Goals (PT) progress toward functional goals is gradual  -WM               User Key  (r) = Recorded By, (t) = Taken By, (c) = Cosigned By      Initials Name Provider Type    TM Rylee Orellana, RN Registered Nurse    Nicole Elise RN Registered Nurse    Adilson Carmona LPN Licensed Nurse    Carson Johnson PTA Physical Therapist Assistant    Almaz Morales RN Registered Nurse                  Section G              Section GG                       Physical Therapy Education       Title: PT OT SLP Therapies (Done)       Topic: Physical Therapy (Done)       Point: Mobility training (Done)       Learning Progress Summary             Patient Acceptance, E,D, DU by PG at 4/24/2024 1039    Acceptance, E,TB, VU by RM at 2/14/2024 0518    Acceptance, E,TB, VU by RM at 1/9/2024 0420    Acceptance, E, VU by CR at 12/30/2023 1750    Acceptance, E, VU by CR at 12/20/2023 1345    Acceptance, E, VU by CR at 12/19/2023 1004    Acceptance, E,TB, VU by RM at 11/30/2023 0420    Acceptance, E,TB, VU by MOE at 11/8/2023 1341                         Point: Precautions (Done)       Learning Progress Summary             Patient Acceptance, E,D, DU by PG at 4/24/2024 1039    Acceptance, E,TB, VU by RM at 2/14/2024 0518    Acceptance, E,TB, VU by RM at 1/9/2024 0420    Acceptance, E, VU by CR at 12/30/2023 1750    Acceptance, E,TB, VU by RM at 11/30/2023 0420    Acceptance, E,TB, VU by MOE at 11/8/2023 1341                                         User Key       Initials Effective Dates Name Provider Type Discipline     06/08/21 -  Bharath Berg RN Registered Nurse Nurse    ROLY 06/13/22 -  Adilson Baker LPN Licensed Nurse Nurse    PG 06/16/21 -  Kodak Matos OT Occupational Therapist OT    MOE 06/03/21 -  Merlin Rao PT Physical Therapist PT                  PT Recommendation and Plan      Progress Summary (PT)  Progress Toward Functional Goals (PT): progress toward functional goals is gradual  Daily Progress Summary (PT): Pt stood 3 times with modA x 2 but was unable to take steps, due to left hip pain.  Pt also declined leg exercises. Will continue with POC   Outcome Measures       Row Name 05/17/24 1247             How much help from another person do you currently need...    Turning from your back to your side while in flat bed without using bedrails? 3  -WM      Moving from lying on back to sitting on the side of a flat bed without bedrails? 2  -WM      Moving to and from a bed to a chair (including a wheelchair)? 2  -WM      Standing up from a chair using your arms (e.g., wheelchair, bedside chair)? 2  -WM      Climbing 3-5 steps with a railing? 1  -WM      To walk in hospital room? 1  -WM      AM-PAC 6 Clicks Score (PT) 11  -WM      Highest Level of Mobility Goal 4 --> Transfer to chair/commode  -WM                User Key  (r) = Recorded By, (t) = Taken By, (c) = Cosigned By      Initials Name Provider Type    Carson Johnson PTA Physical Therapist Assistant                     Time Calculation:    PT Charges       Row Name 05/17/24 1237             Time Calculation    PT Received On 05/17/24  -WM         Timed Charges    08328 - Gait Training Minutes  8  -WM      40322 - PT Therapeutic Activity Minutes 18  -WM         SNF Physical Therapy Minutes    Skilled Minutes- PT 26 min  -WM         Total Minutes    Timed Charges Total Minutes 26  -WM       Total Minutes 26  -WM                User Key  (r) = Recorded By, (t) = Taken By, (c) = Cosigned By      Initials Name Provider Type    Carson Johnson PTA Physical Therapist Assistant                      PT G-Codes  Outcome Measure Options: AM-PAC 6 Clicks Daily Activity (OT), Optimal Instrument  AM-PAC 6 Clicks Score (PT): 11  AM-PAC 6 Clicks Score (OT): 14    Carson Inman PTA  5/17/2024

## 2024-05-17 NOTE — PLAN OF CARE
Goal Outcome Evaluation:  Plan of Care Reviewed With: patient. VSS, AAOX4, pain meds given, percocet and baclofen. Pain controlled to tolerable level with meds. Pt incontinent of stool at times. Makes needs known to staff. Pleasant and cooperative with staff.

## 2024-05-18 LAB
GLUCOSE BLDC GLUCOMTR-MCNC: 108 MG/DL (ref 70–99)
GLUCOSE BLDC GLUCOMTR-MCNC: 114 MG/DL (ref 70–99)
GLUCOSE BLDC GLUCOMTR-MCNC: 126 MG/DL (ref 70–99)
GLUCOSE BLDC GLUCOMTR-MCNC: 139 MG/DL (ref 70–99)

## 2024-05-18 PROCEDURE — 97110 THERAPEUTIC EXERCISES: CPT

## 2024-05-18 PROCEDURE — 82948 REAGENT STRIP/BLOOD GLUCOSE: CPT | Performed by: INTERNAL MEDICINE

## 2024-05-18 PROCEDURE — 63710000001 INSULIN DETEMIR PER 5 UNITS: Performed by: PHYSICIAN ASSISTANT

## 2024-05-18 PROCEDURE — 82948 REAGENT STRIP/BLOOD GLUCOSE: CPT

## 2024-05-18 RX ORDER — BACLOFEN 10 MG/1
10 TABLET ORAL 3 TIMES DAILY PRN
Status: DISPENSED | OUTPATIENT
Start: 2024-05-18

## 2024-05-18 RX ADMIN — IBUPROFEN 400 MG: 400 TABLET ORAL at 11:01

## 2024-05-18 RX ADMIN — CETIRIZINE HYDROCHLORIDE 10 MG: 10 TABLET, FILM COATED ORAL at 08:49

## 2024-05-18 RX ADMIN — EMPAGLIFLOZIN 10 MG: 10 TABLET, FILM COATED ORAL at 08:49

## 2024-05-18 RX ADMIN — SERTRALINE HYDROCHLORIDE 50 MG: 50 TABLET ORAL at 21:59

## 2024-05-18 RX ADMIN — OXYCODONE HYDROCHLORIDE AND ACETAMINOPHEN 1 TABLET: 10; 325 TABLET ORAL at 06:11

## 2024-05-18 RX ADMIN — APIXABAN 5 MG: 5 TABLET, FILM COATED ORAL at 21:59

## 2024-05-18 RX ADMIN — DICLOFENAC SODIUM 4 G: 10 GEL TOPICAL at 22:00

## 2024-05-18 RX ADMIN — BACLOFEN 10 MG: 10 TABLET ORAL at 06:26

## 2024-05-18 RX ADMIN — Medication 10 MG: at 21:59

## 2024-05-18 RX ADMIN — PREGABALIN 100 MG: 100 CAPSULE ORAL at 08:50

## 2024-05-18 RX ADMIN — INSULIN DETEMIR 40 UNITS: 100 INJECTION, SOLUTION SUBCUTANEOUS at 21:58

## 2024-05-18 RX ADMIN — Medication 500 MG: at 21:59

## 2024-05-18 RX ADMIN — OXYCODONE HYDROCHLORIDE AND ACETAMINOPHEN 1 TABLET: 10; 325 TABLET ORAL at 15:03

## 2024-05-18 RX ADMIN — BACLOFEN 10 MG: 10 TABLET ORAL at 23:20

## 2024-05-18 RX ADMIN — DICLOFENAC SODIUM 4 G: 10 GEL TOPICAL at 17:40

## 2024-05-18 RX ADMIN — DICLOFENAC SODIUM 4 G: 10 GEL TOPICAL at 08:48

## 2024-05-18 RX ADMIN — Medication 500 MG: at 08:49

## 2024-05-18 RX ADMIN — OXYCODONE HYDROCHLORIDE AND ACETAMINOPHEN 1 TABLET: 10; 325 TABLET ORAL at 23:20

## 2024-05-18 RX ADMIN — INSULIN DETEMIR 40 UNITS: 100 INJECTION, SOLUTION SUBCUTANEOUS at 08:48

## 2024-05-18 RX ADMIN — APIXABAN 5 MG: 5 TABLET, FILM COATED ORAL at 08:49

## 2024-05-18 RX ADMIN — FUROSEMIDE 40 MG: 40 TABLET ORAL at 08:49

## 2024-05-18 RX ADMIN — PREGABALIN 100 MG: 100 CAPSULE ORAL at 17:40

## 2024-05-18 RX ADMIN — LISINOPRIL 2.5 MG: 2.5 TABLET ORAL at 08:49

## 2024-05-18 RX ADMIN — FERROUS SULFATE TAB 325 MG (65 MG ELEMENTAL FE) 325 MG: 325 (65 FE) TAB at 08:49

## 2024-05-18 RX ADMIN — BACLOFEN 10 MG: 10 TABLET ORAL at 15:03

## 2024-05-18 RX ADMIN — CYANOCOBALAMIN TAB 500 MCG 1000 MCG: 500 TAB at 08:49

## 2024-05-18 RX ADMIN — ATORVASTATIN CALCIUM 10 MG: 10 TABLET, FILM COATED ORAL at 21:59

## 2024-05-18 RX ADMIN — PREGABALIN 100 MG: 100 CAPSULE ORAL at 21:59

## 2024-05-18 NOTE — PLAN OF CARE
Goal Outcome Evaluation:  Plan of Care Reviewed With: patient        Progress: improving  Outcome Evaluation: AOx4, call light and personal items within reach. Able to make needs known to staffing. C/o pain in left hip and shoulder, see emar. Urinal at bedside, 1x with the bedpan. Skin barrier applied, wound care tolorated well. Con't with blood sugar check. Con't plan of care

## 2024-05-18 NOTE — PLAN OF CARE
Goal Outcome Evaluation:  Plan of Care Reviewed With: patient VSS,AAOX4, able to make needs known to staff. Continue  to c/o hip and shoulder pain. Takes Baclofen, Oxycodone and Ibuprophen to manage pain. Requires assist of 2-3 to transfer. Uses urinal independently at bedside, compliant with POC. Personal items and call light within reach at bedside.

## 2024-05-18 NOTE — THERAPY TREATMENT NOTE
SNF - Physical Therapy Progress Note  KEO Dominguez     Patient Name: Jose Shaikh  : 1960  MRN: [id number]  Today's Date: 2024      Visit Dx:    ICD-10-CM ICD-9-CM   1. Difficulty in walking  R26.2 719.7   2. Type 2 diabetes mellitus with other specified complication, unspecified whether long term insulin use  E11.69 250.80     Patient Active Problem List   Diagnosis    Diabetic ulcer of left heel associated with type 2 DM    Acute osteomyelitis of left calcaneus     Diabetic ulcer of left heel associated with type 2 DM    Diabetic ulcer of right midfoot associated with type 2 DM    Paroxysmal atrial fibrillation    Essential hypertension    Hyperlipidemia LDL goal <100    Cellulitis and abscess of foot    High alkaline phosphatase level    Osteomyelitis    Onychomycosis    Onychocryptosis    Foot pain, bilateral    Osteomyelitis of foot, right, acute    Cellulitis of right foot    Type 2 diabetes mellitus, with long-term current use of insulin    Class 3 severe obesity due to excess calories with serious comorbidity and body mass index (BMI) of 45.0 to 49.9 in adult    Anxiety disorder, unspecified    Claustrophobia    Dependence on wheelchair    Depression, unspecified    Long term (current) use of anticoagulants    Long term (current) use of oral hypoglycemic drugs    Wound of foot    Ulcer of right foot    Orthostatic hypotension    Other chronic osteomyelitis, right ankle and foot    Personal history of nicotine dependence    Thrombocytopenia, unspecified    Unspecified open wound, right foot, initial encounter    Diabetic foot infection    Subacute osteomyelitis of right foot    Right foot pain    Sepsis    Onychomycosis    Foot pain, left    Impaired mobility and ADLs    Absence of toe of right foot    Corns and callosity    Disability of walking    Fracture    Limb swelling    Polyneuropathy    Pressure ulcer, stage 1    Shortness of breath    Generalized weakness    Debility    Ulcerated, foot,  left, limited to breakdown of skin    Onychomycosis     Past Medical History:   Diagnosis Date    Absence of toe of right foot     Acute osteomyelitis of left calcaneus  08/18/2021    Anxiety and depression     Arthritis     Cancer     Chronic pain     STATES HIS PAIN IS 10/10 AAT    Claustrophobia     Corns and callus     Diabetic ulcer of left heel associated with type 2 DM 08/18/2021    Diabetic ulcer of left heel associated with type 2 DM 07/06/2021    Diabetic ulcer of right midfoot associated with type 2 DM 08/18/2021    Difficulty walking     Essential hypertension 08/31/2021    Hammertoe     Hyperlipidemia LDL goal <100 08/31/2021    Ingrown toenail     Obesity     Paroxysmal atrial fibrillation 08/31/2021    Polyneuropathy     Pressure ulcer, stage 1     Tinea unguium     Type 2 diabetes mellitus with polyneuropathy      Past Surgical History:   Procedure Laterality Date    CYST REMOVAL      center of back; benign    EYE SURGERY      INCISION AND DRAINAGE ABSCESS      back    INCISION AND DRAINAGE LEG Right 12/10/2021    Procedure: INCISION AND DRAINAGE LOWER EXTREMITY;  Surgeon: Ash Leyva DPM;  Location: [facility];  Service: Podiatry;  Laterality: Right;    OTHER SURGICAL HISTORY      Surgical clips left foot    TOE SURGERY Right     Removal of 5th toe    TRANS METATARSAL AMPUTATION Right 12/02/2021    Procedure: AMPUTATION TRANS METATARSAL;  Surgeon: Ash Leyva DPM;  Location: [facility];  Service: Podiatry;  Laterality: Right;    VASCULAR SURGERY      WRIST SURGERY Left     repair of injury       PT Assessment (Last 12 Hours)       PT Evaluation and Treatment       Row Name 05/18/24 1200          Physical Therapy Time and Intention    Subjective Information no complaints  -CS     Document Type therapy note (daily note)  -CS     Mode of Treatment individual therapy;physical therapy  -CS     Patient Effort good  -CS     Symptoms Noted During/After Treatment none  -CS        Row Name 05/18/24 1200          Hip (Therapeutic Exercise)    Hip AAROM (Therapeutic Exercise) bilateral;supine;10 repetitions;3 sets;aBduction;aDduction;external rotation;internal rotation  heel slides  -CS     Hip Isometrics (Therapeutic Exercise) bilateral;gluteal sets;supine;10 repetitions;3 sets  -CS       Row Name 05/18/24 1200          Knee (Therapeutic Exercise)    Knee AROM (Therapeutic Exercise) bilateral;SAQ (short arc quad);supine;30 repititions  -     Knee AAROM (Therapeutic Exercise) bilateral;supine;10 repetitions;3 sets  SLR's  -CS     Knee Isometrics (Therapeutic Exercise) bilateral;quad sets;supine;10 second hold;3 sets  -CS       Row Name 05/18/24 1200          Ankle (Therapeutic Exercise)    Ankle AROM (Therapeutic Exercise) bilateral;dorsiflexion;plantarflexion;supine;30 repititions  -CS       Row Name             Wound 03/27/24 1045 Right anterior Skin Tear    Wound - Properties Group Placement Date: 03/27/24  -CR Placement Time: 1045  -CR Side: Right  -CR Orientation: anterior  -CR Primary Wound Type: Skin tear  -CR    Retired Wound - Properties Group Placement Date: 03/27/24  -CR Placement Time: 1045  -CR Side: Right  -CR Orientation: anterior  -CR Primary Wound Type: Skin tear  -CR    Retired Wound - Properties Group Date first assessed: 03/27/24  -CR Time first assessed: 1045  -CR Side: Right  -CR Primary Wound Type: Skin tear  -CR      Row Name             Wound 04/03/24 1730 Right anterior hip MASD (Moisture associated skin damage)    Wound - Properties Group Placement Date: 04/03/24  -MI Placement Time: 1730  -MI Side: Right  -MI Orientation: anterior  -MI Location: hip  -MI Primary Wound Type: MASD  -MI    Retired Wound - Properties Group Placement Date: 04/03/24  -MI Placement Time: 1730  -MI Side: Right  -MI Orientation: anterior  -MI Location: hip  -MI Primary Wound Type: MASD  -MI    Retired Wound - Properties Group Date first assessed: 04/03/24  -MI Time first assessed: 1730   -IA Side: Right  -IA Location: hip  -IA Primary Wound Type: MASD  -IA      Row Name             Wound 05/02/24 1033 Left posterior plantar    Wound - Properties Group Placement Date: 05/02/24  -KR Placement Time: 1033  -KR Side: Left  -KR Orientation: posterior  -KR Location: plantar  -KR    Retired Wound - Properties Group Placement Date: 05/02/24  -KR Placement Time: 1033  -KR Side: Left  -KR Orientation: posterior  -KR Location: plantar  -KR    Retired Wound - Properties Group Date first assessed: 05/02/24  -KR Time first assessed: 1033  -KR Side: Left  -KR Location: plantar  -KR      Row Name             Wound 05/09/24 2010 Right lateral abdomen Abrasion    Wound - Properties Group Placement Date: 05/09/24  -TM Placement Time: 2010 -TM Side: Right  -TM Orientation: lateral  -TM Location: abdomen  -TM Primary Wound Type: Abrasion  -TM    Retired Wound - Properties Group Placement Date: 05/09/24  -TM Placement Time: 2010 -TM Side: Right  -TM Orientation: lateral  -TM Location: abdomen  -TM Primary Wound Type: Abrasion  -TM    Retired Wound - Properties Group Date first assessed: 05/09/24  -TM Time first assessed: 2010 -TM Side: Right  -TM Location: abdomen  -TM Primary Wound Type: Abrasion  -TM      Row Name 05/18/24 1200          Positioning and Restraints    Pre-Treatment Position in bed  -CS     Post Treatment Position bed  -CS     In Bed fowlers;call light within reach;encouraged to call for assist;LUE elevated  -CS       Row Name 05/18/24 1200          Progress Summary (PT)    Progress Toward Functional Goals (PT) progress toward functional goals is gradual  -CS               User Key  (r) = Recorded By, (t) = Taken By, (c) = Cosigned By      Initials Name Provider Type    TM Rylee Orellana RN Registered Nurse    Nicole Elise RN Registered Nurse    Adilson Carmona LPN Licensed Nurse    Ronald Huerta PTA Physical Therapist Assistant    Almaz Morales, RN Registered Nurse                   Section G              Section GG                       Physical Therapy Education       Title: PT OT SLP Therapies (Done)       Topic: Physical Therapy (Done)       Point: Mobility training (Done)       Learning Progress Summary             Patient Acceptance, E,D, DU by PG at 4/24/2024 1039    Acceptance, E,TB, VU by RM at 2/14/2024 0518    Acceptance, E,TB, VU by RM at 1/9/2024 0420    Acceptance, E, VU by CR at 12/30/2023 1750    Acceptance, E, VU by CR at 12/20/2023 1345    Acceptance, E, VU by CR at 12/19/2023 1004    Acceptance, E,TB, VU by RM at 11/30/2023 0420    Acceptance, E,TB, VU by MOE at 11/8/2023 1341                         Point: Precautions (Done)       Learning Progress Summary             Patient Acceptance, E,D, DU by PG at 4/24/2024 1039    Acceptance, E,TB, VU by RM at 2/14/2024 0518    Acceptance, E,TB, VU by RM at 1/9/2024 0420    Acceptance, E, VU by CR at 12/30/2023 1750    Acceptance, E,TB, VU by RM at 11/30/2023 0420    Acceptance, E,TB, VU by MOE at 11/8/2023 1341                                         User Key       Initials Effective Dates Name Provider Type Discipline     06/08/21 -  Bharath Berg, RN Registered Nurse Nurse    CR 06/13/22 -  Adilson Baker LPN Licensed Nurse Nurse    PG 06/16/21 -  Kodak Matos OT Occupational Therapist OT    MOE 06/03/21 -  Merlin Rao PT Physical Therapist PT                  PT Recommendation and Plan     Progress Summary (PT)  Progress Toward Functional Goals (PT): progress toward functional goals is gradual  Daily Progress Summary (PT): Decreased assistance for ther-ex's today in comparison to treatment I performed with pt. last weekend.  Pt. still requires assistance but overall, assistance level decreased with range of motion in bilateral lower extremeties.   Outcome Measures       Row Name 05/18/24 1200 05/17/24 1247          How much help from another person do you currently need...    Turning from your back to your side while  in flat bed without using bedrails? 3  -CS 3  -WM     Moving from lying on back to sitting on the side of a flat bed without bedrails? 3  -CS 2  -WM     Moving to and from a bed to a chair (including a wheelchair)? 2  -CS 2  -WM     Standing up from a chair using your arms (e.g., wheelchair, bedside chair)? 2  -CS 2  -WM     Climbing 3-5 steps with a railing? 1  -CS 1  -WM     To walk in hospital room? 1  -CS 1  -WM     AM-PAC 6 Clicks Score (PT) 12  -CS 11  -WM     Highest Level of Mobility Goal 4 --> Transfer to chair/commode  -CS 4 --> Transfer to chair/commode  -WM        Functional Assessment    Outcome Measure Options AM-PAC 6 Clicks Basic Mobility (PT)  -CS --               User Key  (r) = Recorded By, (t) = Taken By, (c) = Cosigned By      Initials Name Provider Type     Carson Inman PTA Physical Therapist Assistant     Ronald Fabian PTA Physical Therapist Assistant                     Time Calculation:    PT Charges       Row Name 05/18/24 1230             Time Calculation    Start Time 1022  -CS      PT Received On 05/18/24  -CS         Timed Charges    74451 - PT Therapeutic Exercise Minutes 28  -CS         SNF Physical Therapy Minutes    Skilled Minutes- PT 28 min  -CS         Total Minutes    Timed Charges Total Minutes 28  -CS       Total Minutes 28  -CS                User Key  (r) = Recorded By, (t) = Taken By, (c) = Cosigned By      Initials Name Provider Type     Ronald Fabian PTA Physical Therapist Assistant                  Therapy Charges for Today       Code Description Service Date Service Provider Modifiers Qty    [id number]  PT THER PROC EA 15 MIN 5/18/2024 Ronald Fabian PTA GP 2            PT G-Codes  Outcome Measure Options: AM-PAC 6 Clicks Basic Mobility (PT)  AM-PAC 6 Clicks Score (PT): 12  AM-PAC 6 Clicks Score (OT): 14    Ronald Fabian PTA  5/18/2024

## 2024-05-19 LAB
GLUCOSE BLDC GLUCOMTR-MCNC: 107 MG/DL (ref 70–99)
GLUCOSE BLDC GLUCOMTR-MCNC: 122 MG/DL (ref 70–99)
GLUCOSE BLDC GLUCOMTR-MCNC: 131 MG/DL (ref 70–99)
GLUCOSE BLDC GLUCOMTR-MCNC: 157 MG/DL (ref 70–99)

## 2024-05-19 PROCEDURE — 63710000001 INSULIN DETEMIR PER 5 UNITS: Performed by: PHYSICIAN ASSISTANT

## 2024-05-19 PROCEDURE — 63710000001 INSULIN LISPRO (HUMAN) PER 5 UNITS: Performed by: PHYSICIAN ASSISTANT

## 2024-05-19 PROCEDURE — 82948 REAGENT STRIP/BLOOD GLUCOSE: CPT | Performed by: INTERNAL MEDICINE

## 2024-05-19 PROCEDURE — 82948 REAGENT STRIP/BLOOD GLUCOSE: CPT

## 2024-05-19 RX ORDER — INSULIN LISPRO 100 [IU]/ML
3-14 INJECTION, SOLUTION INTRAVENOUS; SUBCUTANEOUS
Status: DISPENSED | OUTPATIENT
Start: 2024-05-19

## 2024-05-19 RX ORDER — OXYCODONE AND ACETAMINOPHEN 10; 325 MG/1; MG/1
1 TABLET ORAL EVERY 8 HOURS PRN
Status: DISPENSED | OUTPATIENT
Start: 2024-05-19 | End: 2024-06-26

## 2024-05-19 RX ADMIN — PREGABALIN 100 MG: 100 CAPSULE ORAL at 15:46

## 2024-05-19 RX ADMIN — ACETAMINOPHEN 650 MG: 325 TABLET ORAL at 10:34

## 2024-05-19 RX ADMIN — BACLOFEN 10 MG: 10 TABLET ORAL at 07:56

## 2024-05-19 RX ADMIN — DICLOFENAC SODIUM 4 G: 10 GEL TOPICAL at 17:09

## 2024-05-19 RX ADMIN — OXYCODONE HYDROCHLORIDE AND ACETAMINOPHEN 1 TABLET: 10; 325 TABLET ORAL at 07:56

## 2024-05-19 RX ADMIN — APIXABAN 5 MG: 5 TABLET, FILM COATED ORAL at 20:02

## 2024-05-19 RX ADMIN — DICLOFENAC SODIUM 4 G: 10 GEL TOPICAL at 08:01

## 2024-05-19 RX ADMIN — CYANOCOBALAMIN TAB 500 MCG 1000 MCG: 500 TAB at 08:00

## 2024-05-19 RX ADMIN — ATORVASTATIN CALCIUM 10 MG: 10 TABLET, FILM COATED ORAL at 20:02

## 2024-05-19 RX ADMIN — APIXABAN 5 MG: 5 TABLET, FILM COATED ORAL at 08:00

## 2024-05-19 RX ADMIN — PREGABALIN 100 MG: 100 CAPSULE ORAL at 08:00

## 2024-05-19 RX ADMIN — FERROUS SULFATE TAB 325 MG (65 MG ELEMENTAL FE) 325 MG: 325 (65 FE) TAB at 08:00

## 2024-05-19 RX ADMIN — DICLOFENAC SODIUM 4 G: 10 GEL TOPICAL at 20:01

## 2024-05-19 RX ADMIN — SERTRALINE HYDROCHLORIDE 50 MG: 50 TABLET ORAL at 20:02

## 2024-05-19 RX ADMIN — INSULIN DETEMIR 35 UNITS: 100 INJECTION, SOLUTION SUBCUTANEOUS at 20:01

## 2024-05-19 RX ADMIN — INSULIN DETEMIR 40 UNITS: 100 INJECTION, SOLUTION SUBCUTANEOUS at 08:00

## 2024-05-19 RX ADMIN — Medication 500 MG: at 20:02

## 2024-05-19 RX ADMIN — IBUPROFEN 400 MG: 400 TABLET ORAL at 16:18

## 2024-05-19 RX ADMIN — ACETAMINOPHEN 650 MG: 325 TABLET ORAL at 02:30

## 2024-05-19 RX ADMIN — OXYCODONE AND ACETAMINOPHEN 1 TABLET: 10; 325 TABLET ORAL at 19:44

## 2024-05-19 RX ADMIN — EMPAGLIFLOZIN 10 MG: 10 TABLET, FILM COATED ORAL at 08:00

## 2024-05-19 RX ADMIN — Medication 10 MG: at 20:02

## 2024-05-19 RX ADMIN — INSULIN LISPRO 3 UNITS: 100 INJECTION, SOLUTION INTRAVENOUS; SUBCUTANEOUS at 20:01

## 2024-05-19 RX ADMIN — LISINOPRIL 2.5 MG: 2.5 TABLET ORAL at 08:00

## 2024-05-19 RX ADMIN — DICLOFENAC SODIUM 4 G: 10 GEL TOPICAL at 11:32

## 2024-05-19 RX ADMIN — CETIRIZINE HYDROCHLORIDE 10 MG: 10 TABLET, FILM COATED ORAL at 08:00

## 2024-05-19 RX ADMIN — Medication 500 MG: at 08:00

## 2024-05-19 RX ADMIN — PREGABALIN 100 MG: 100 CAPSULE ORAL at 20:02

## 2024-05-19 RX ADMIN — FUROSEMIDE 40 MG: 40 TABLET ORAL at 08:00

## 2024-05-19 RX ADMIN — BACLOFEN 10 MG: 10 TABLET ORAL at 19:44

## 2024-05-19 NOTE — PLAN OF CARE
Goal Outcome Evaluation:  Plan of Care Reviewed With: patient, VSS, AAOX4, makes needs known to staff. Pain medication q 8 hours. Call light and personal items within reach at bedside. No changes.

## 2024-05-19 NOTE — PLAN OF CARE
Goal Outcome Evaluation:  Plan of Care Reviewed With: patient        Progress: no change  Outcome Evaluation: AOx4, call light and personal items within reach. Able to make needs known to staffing. C/o pain in left hip and shoulder, see emar. Reported a h/a, tylenol given with calm enviroment. Urinal at bedside, 1x with the bedpan. Wound care tolorated well. Con't plan of care

## 2024-05-20 LAB
GLUCOSE BLDC GLUCOMTR-MCNC: 101 MG/DL (ref 70–99)
GLUCOSE BLDC GLUCOMTR-MCNC: 106 MG/DL (ref 70–99)
GLUCOSE BLDC GLUCOMTR-MCNC: 142 MG/DL (ref 70–99)
GLUCOSE BLDC GLUCOMTR-MCNC: 168 MG/DL (ref 70–99)

## 2024-05-20 PROCEDURE — 97530 THERAPEUTIC ACTIVITIES: CPT

## 2024-05-20 PROCEDURE — 82948 REAGENT STRIP/BLOOD GLUCOSE: CPT

## 2024-05-20 PROCEDURE — 82948 REAGENT STRIP/BLOOD GLUCOSE: CPT | Performed by: INTERNAL MEDICINE

## 2024-05-20 PROCEDURE — 97116 GAIT TRAINING THERAPY: CPT

## 2024-05-20 PROCEDURE — 63710000001 INSULIN LISPRO (HUMAN) PER 5 UNITS: Performed by: PHYSICIAN ASSISTANT

## 2024-05-20 PROCEDURE — 63710000001 INSULIN DETEMIR PER 5 UNITS: Performed by: PHYSICIAN ASSISTANT

## 2024-05-20 RX ADMIN — ACETAMINOPHEN 650 MG: 325 TABLET ORAL at 11:19

## 2024-05-20 RX ADMIN — DICLOFENAC SODIUM 4 G: 10 GEL TOPICAL at 17:58

## 2024-05-20 RX ADMIN — BACLOFEN 10 MG: 10 TABLET ORAL at 05:06

## 2024-05-20 RX ADMIN — PREGABALIN 100 MG: 100 CAPSULE ORAL at 08:22

## 2024-05-20 RX ADMIN — FERROUS SULFATE TAB 325 MG (65 MG ELEMENTAL FE) 325 MG: 325 (65 FE) TAB at 08:22

## 2024-05-20 RX ADMIN — ATORVASTATIN CALCIUM 10 MG: 10 TABLET, FILM COATED ORAL at 21:19

## 2024-05-20 RX ADMIN — LISINOPRIL 2.5 MG: 2.5 TABLET ORAL at 08:22

## 2024-05-20 RX ADMIN — ACETAMINOPHEN 650 MG: 325 TABLET ORAL at 03:22

## 2024-05-20 RX ADMIN — OXYCODONE AND ACETAMINOPHEN 1 TABLET: 10; 325 TABLET ORAL at 22:17

## 2024-05-20 RX ADMIN — CETIRIZINE HYDROCHLORIDE 10 MG: 10 TABLET, FILM COATED ORAL at 08:22

## 2024-05-20 RX ADMIN — SERTRALINE HYDROCHLORIDE 50 MG: 50 TABLET ORAL at 21:19

## 2024-05-20 RX ADMIN — Medication 500 MG: at 21:18

## 2024-05-20 RX ADMIN — EMPAGLIFLOZIN 10 MG: 10 TABLET, FILM COATED ORAL at 08:22

## 2024-05-20 RX ADMIN — Medication 500 MG: at 08:22

## 2024-05-20 RX ADMIN — DICLOFENAC SODIUM 4 G: 10 GEL TOPICAL at 08:23

## 2024-05-20 RX ADMIN — BACLOFEN 10 MG: 10 TABLET ORAL at 14:02

## 2024-05-20 RX ADMIN — INSULIN DETEMIR 35 UNITS: 100 INJECTION, SOLUTION SUBCUTANEOUS at 08:22

## 2024-05-20 RX ADMIN — DICLOFENAC SODIUM 4 G: 10 GEL TOPICAL at 14:03

## 2024-05-20 RX ADMIN — PREGABALIN 100 MG: 100 CAPSULE ORAL at 16:20

## 2024-05-20 RX ADMIN — APIXABAN 5 MG: 5 TABLET, FILM COATED ORAL at 21:19

## 2024-05-20 RX ADMIN — OXYCODONE AND ACETAMINOPHEN 1 TABLET: 10; 325 TABLET ORAL at 05:06

## 2024-05-20 RX ADMIN — PREGABALIN 100 MG: 100 CAPSULE ORAL at 21:19

## 2024-05-20 RX ADMIN — BACLOFEN 10 MG: 10 TABLET ORAL at 22:17

## 2024-05-20 RX ADMIN — APIXABAN 5 MG: 5 TABLET, FILM COATED ORAL at 08:22

## 2024-05-20 RX ADMIN — OXYCODONE AND ACETAMINOPHEN 1 TABLET: 10; 325 TABLET ORAL at 14:01

## 2024-05-20 RX ADMIN — INSULIN DETEMIR 35 UNITS: 100 INJECTION, SOLUTION SUBCUTANEOUS at 21:18

## 2024-05-20 RX ADMIN — Medication 10 MG: at 21:19

## 2024-05-20 RX ADMIN — DICLOFENAC SODIUM 4 G: 10 GEL TOPICAL at 21:22

## 2024-05-20 RX ADMIN — INSULIN LISPRO 3 UNITS: 100 INJECTION, SOLUTION INTRAVENOUS; SUBCUTANEOUS at 21:18

## 2024-05-20 RX ADMIN — CYANOCOBALAMIN TAB 500 MCG 1000 MCG: 500 TAB at 08:21

## 2024-05-20 RX ADMIN — FUROSEMIDE 40 MG: 40 TABLET ORAL at 08:21

## 2024-05-20 NOTE — PLAN OF CARE
Goal Outcome Evaluation:  Plan of Care Reviewed With: patient        Progress: improving  Outcome Evaluation: Resident alert, oriented, able to make needs known to staff. transfers with assist of 2 therapist, or 3 staff members. Up in chair briefly today. Medicated with prn percocet and baclofen x1, effective, and prn Tylenol x1, effective. Blood glucose stable for all 3 meals. Resident pleasant and cooperative.

## 2024-05-20 NOTE — THERAPY TREATMENT NOTE
SNF - Physical Therapy Treatment Note  KEO Dominguez     Patient Name: Jose Shaikh  : 1960  MRN: [id number]  Today's Date: 2024      Visit Dx:    ICD-10-CM ICD-9-CM   1. Difficulty in walking  R26.2 719.7   2. Type 2 diabetes mellitus with other specified complication, unspecified whether long term insulin use  E11.69 250.80     Patient Active Problem List   Diagnosis    Diabetic ulcer of left heel associated with type 2 DM    Acute osteomyelitis of left calcaneus     Diabetic ulcer of left heel associated with type 2 DM    Diabetic ulcer of right midfoot associated with type 2 DM    Paroxysmal atrial fibrillation    Essential hypertension    Hyperlipidemia LDL goal <100    Cellulitis and abscess of foot    High alkaline phosphatase level    Osteomyelitis    Onychomycosis    Onychocryptosis    Foot pain, bilateral    Osteomyelitis of foot, right, acute    Cellulitis of right foot    Type 2 diabetes mellitus, with long-term current use of insulin    Class 3 severe obesity due to excess calories with serious comorbidity and body mass index (BMI) of 45.0 to 49.9 in adult    Anxiety disorder, unspecified    Claustrophobia    Dependence on wheelchair    Depression, unspecified    Long term (current) use of anticoagulants    Long term (current) use of oral hypoglycemic drugs    Wound of foot    Ulcer of right foot    Orthostatic hypotension    Other chronic osteomyelitis, right ankle and foot    Personal history of nicotine dependence    Thrombocytopenia, unspecified    Unspecified open wound, right foot, initial encounter    Diabetic foot infection    Subacute osteomyelitis of right foot    Right foot pain    Sepsis    Onychomycosis    Foot pain, left    Impaired mobility and ADLs    Absence of toe of right foot    Corns and callosity    Disability of walking    Fracture    Limb swelling    Polyneuropathy    Pressure ulcer, stage 1    Shortness of breath    Generalized weakness    Debility    Ulcerated, foot,  left, limited to breakdown of skin    Onychomycosis     Past Medical History:   Diagnosis Date    Absence of toe of right foot     Acute osteomyelitis of left calcaneus  08/18/2021    Anxiety and depression     Arthritis     Cancer     Chronic pain     STATES HIS PAIN IS 10/10 AAT    Claustrophobia     Corns and callus     Diabetic ulcer of left heel associated with type 2 DM 08/18/2021    Diabetic ulcer of left heel associated with type 2 DM 07/06/2021    Diabetic ulcer of right midfoot associated with type 2 DM 08/18/2021    Difficulty walking     Essential hypertension 08/31/2021    Hammertoe     Hyperlipidemia LDL goal <100 08/31/2021    Ingrown toenail     Obesity     Paroxysmal atrial fibrillation 08/31/2021    Polyneuropathy     Pressure ulcer, stage 1     Tinea unguium     Type 2 diabetes mellitus with polyneuropathy      Past Surgical History:   Procedure Laterality Date    CYST REMOVAL      center of back; benign    EYE SURGERY      INCISION AND DRAINAGE ABSCESS      back    INCISION AND DRAINAGE LEG Right 12/10/2021    Procedure: INCISION AND DRAINAGE LOWER EXTREMITY;  Surgeon: Ash Leyva DPM;  Location: [facility];  Service: Podiatry;  Laterality: Right;    OTHER SURGICAL HISTORY      Surgical clips left foot    TOE SURGERY Right     Removal of 5th toe    TRANS METATARSAL AMPUTATION Right 12/02/2021    Procedure: AMPUTATION TRANS METATARSAL;  Surgeon: Ash Leyva DPM;  Location: [facility];  Service: Podiatry;  Laterality: Right;    VASCULAR SURGERY      WRIST SURGERY Left     repair of injury       PT Assessment (Last 12 Hours)       PT Evaluation and Treatment       Row Name 05/20/24 8142          Physical Therapy Time and Intention    Subjective Information complains of;pain  -WM     Document Type therapy note (daily note)  -WM     Mode of Treatment individual therapy;physical therapy  -WM     Patient Effort good  -WM     Symptoms Noted During/After Treatment fatigue   -       Row Name 05/20/24 1431          Pain Scale: FACES Pre/Post-Treatment    Pain: FACES Scale, Pretreatment 8-->hurts whole lot  -WM     Posttreatment Pain Rating 8-->hurts whole lot  -WM     Pain Location - Side/Orientation Left  -     Pain Location - hip;knee  -       Row Name 05/20/24 1431          Bed Mobility    Supine-Sit [place] (Bed Mobility) minimum assist (75% patient effort);1 person assist  -     Bed Mobility, Safety Issues decreased use of legs for bridging/pushing  -     Assistive Device (Bed Mobility) bed rails;draw sheet;overhead trapeze  -       Row Name 05/20/24 1431          Sit-Stand Transfer    Sit-Stand [place] (Transfers) moderate assist (50% patient effort);2 person assist  -     Assistive Device (Sit-Stand Transfers) bariatric;walker, front-wheeled  -       Row Name 05/20/24 1431          Stand-Sit Transfer    Stand-Sit [place] (Transfers) moderate assist (50% patient effort);2 person assist  -     Assistive Device (Stand-Sit Transfers) bariatric;walker, front-wheeled  -WM       Row Name 05/20/24 [facility]          Gait/Stairs (Locomotion)    [place] Level (Gait) minimum assist (75% patient effort);moderate assist (50% patient effort);2 person assist;verbal cues  -     Assistive Device (Gait) bariatric equipment;walker, front-wheeled  -     Distance in Feet (Gait) 4  + 2  -WM     Pattern (Gait) 4-point;step-to  -WM     Deviations/Abnormal Patterns (Gait) gait speed decreased;stride length decreased  -       Row Name 05/20/24 1431          Safety Issues, Functional Mobility    Impairments Affecting Function (Mobility) balance;endurance/activity tolerance;pain;strength  -       Row Name             Wound 03/27/24 1045 Right anterior Skin Tear    Wound - Properties Group Placement Date: 03/27/24  -CR Placement Time: 1045  -CR Side: Right  -CR Orientation: anterior  -CR Primary Wound Type: Skin tear  -CR    Retired Wound - Properties Group Placement  Date: 03/27/24  -CR Placement Time: 1045  -CR Side: Right  -CR Orientation: anterior  -CR Primary Wound Type: Skin tear  -CR    Retired Wound - Properties Group Date first assessed: 03/27/24  -CR Time first assessed: 1045  -CR Side: Right  -CR Primary Wound Type: Skin tear  -CR      Row Name             Wound 04/03/24 1730 Right anterior hip MASD (Moisture associated skin damage)    Wound - Properties Group Placement Date: 04/03/24  -MN Placement Time: 1730  -MN Side: Right  -MN Orientation: anterior  -MN Location: hip  -MN Primary Wound Type: MASD  -MN    Retired Wound - Properties Group Placement Date: 04/03/24  -MN Placement Time: 1730  -MN Side: Right  -MN Orientation: anterior  -MN Location: hip  -MN Primary Wound Type: MASD  -MN    Retired Wound - Properties Group Date first assessed: 04/03/24  -MN Time first assessed: 1730  -MN Side: Right  -MN Location: hip  -MN Primary Wound Type: MASD  -MN      Row Name             Wound 05/02/24 1033 Left posterior plantar    Wound - Properties Group Placement Date: 05/02/24  -KR Placement Time: 1033  -KR Side: Left  -KR Orientation: posterior  -KR Location: plantar  -KR    Retired Wound - Properties Group Placement Date: 05/02/24  -KR Placement Time: 1033  -KR Side: Left  -KR Orientation: posterior  -KR Location: plantar  -KR    Retired Wound - Properties Group Date first assessed: 05/02/24  -KR Time first assessed: 1033  -KR Side: Left  -KR Location: plantar  -KR      Row Name             Wound 05/09/24 2010 Right lateral abdomen Abrasion    Wound - Properties Group Placement Date: 05/09/24  -TM Placement Time: 2010  -TM Side: Right  -TM Orientation: lateral  -TM Location: abdomen  -TM Primary Wound Type: Abrasion  -TM    Retired Wound - Properties Group Placement Date: 05/09/24  -TM Placement Time: 2010  -TM Side: Right  -TM Orientation: lateral  -TM Location: abdomen  -TM Primary Wound Type: Abrasion  -TM    Retired Wound - Properties Group Date first assessed:  05/09/24  -TM Time first assessed: 2010  -TM Side: Right  -TM Location: abdomen  -TM Primary Wound Type: Abrasion  -TM      Row Name 05/20/24 1431          Positioning and Restraints    Pre-Treatment Position in bed  -WM     Post Treatment Position chair  -WM     In Chair reclined;call light within reach;encouraged to call for assist  -WM       Row Name 05/20/24 1431          Progress Summary (PT)    Progress Toward Functional Goals (PT) progress toward functional goals is gradual  -WM     Daily Progress Summary (PT) Pt ambulated 4' and after brief sitting rest break he ambulated an additional 2'. Pt was transferred to recliner after ambulating. Pt was inrecliner ~ 30 minutes and requested to be transferred back to bed. Continue with POC.  -WM               User Key  (r) = Recorded By, (t) = Taken By, (c) = Cosigned By      Initials Name Provider Type    TM Rylee Orellana, RN Registered Nurse    Nicole Elise RN Registered Nurse    Adilson Carmona LPN Licensed Nurse    Carson Johnson PTA Physical Therapist Assistant    Almaz Morales RN Registered Nurse                  Section G              Section GG                       Physical Therapy Education       Title: PT OT SLP Therapies (Done)       Topic: Physical Therapy (Done)       Point: Mobility training (Done)       Learning Progress Summary             Patient Acceptance, E,D, DU by PG at 4/24/2024 1039    Acceptance, E,TB, VU by RM at 2/14/2024 0518    Acceptance, E,TB, VU by RM at 1/9/2024 0420    Acceptance, E, VU by CR at 12/30/2023 1750    Acceptance, E, VU by CR at 12/20/2023 1345    Acceptance, E, VU by CR at 12/19/2023 1004    Acceptance, E,TB, VU by RM at 11/30/2023 0420    Acceptance, E,TB, VU by MOE at 11/8/2023 1341                         Point: Precautions (Done)       Learning Progress Summary             Patient Acceptance, E,D, DU by PG at 4/24/2024 1039    Acceptance, E,TB, VU by RM at 2/14/2024 0518    Acceptance, E,TB, VU by   at 1/9/2024 0420    Acceptance, E, VU by CR at 12/30/2023 1750    Acceptance, E,TB, VU by RM at 11/30/2023 0420    Acceptance, E,TB, VU by MOE at 11/8/2023 1341                                         User Key       Initials Effective Dates Name Provider Type Discipline     06/08/21 -  Bharath Berg RN Registered Nurse Nurse    CR 06/13/22 -  Adilson Baker LPN Licensed Nurse Nurse    PG 06/16/21 -  Kodak Matos OT Occupational Therapist OT    MOE 06/03/21 -  Merlin Rao PT Physical Therapist PT                  PT Recommendation and Plan     Progress Summary (PT)  Progress Toward Functional Goals (PT): progress toward functional goals is gradual  Daily Progress Summary (PT): Pt ambulated 4' and after brief sitting rest break he ambulated an additional 2'. Pt was transferred to recliner after ambulating. Pt was inrecliner ~ 30 minutes and requested to be transferred back to bed. Continue with POC.   Outcome Measures       Row Name 05/20/24 1441 05/18/24 1200          How much help from another person do you currently need...    Turning from your back to your side while in flat bed without using bedrails? 3  -WM 3  -CS     Moving from lying on back to sitting on the side of a flat bed without bedrails? 3  -WM 3  -CS     Moving to and from a bed to a chair (including a wheelchair)? 2  -WM 2  -CS     Standing up from a chair using your arms (e.g., wheelchair, bedside chair)? 2  -WM 2  -CS     Climbing 3-5 steps with a railing? 1  -WM 1  -CS     To walk in hospital room? 1  -WM 1  -CS     AM-PAC 6 Clicks Score (PT) 12  -WM 12  -CS     Highest Level of Mobility Goal 4 --> Transfer to chair/commode  -WM 4 --> Transfer to chair/commode  -CS        Functional Assessment    Outcome Measure Options -- AM-PAC 6 Clicks Basic Mobility (PT)  -CS               User Key  (r) = Recorded By, (t) = Taken By, (c) = Cosigned By      Initials Name Provider Type    Carson Johnson PTA Physical Therapist Assistant    MARSHAL  Ronald Fabian PTA Physical Therapist Assistant                     Time Calculation:    PT Charges       Row Name 05/20/24 1430             Time Calculation    PT Received On 05/20/24  -WM         Timed Charges    89043 - Gait Training Minutes  7  -WM      73456 - PT Therapeutic Activity Minutes 20  -WM         SNF Physical Therapy Minutes    Skilled Minutes- PT 27 min  -WM         Total Minutes    Timed Charges Total Minutes 27  -WM       Total Minutes 27  -WM                User Key  (r) = Recorded By, (t) = Taken By, (c) = Cosigned By      Initials Name Provider Type     Carson Inman PTA Physical Therapist Assistant                      PT G-Codes  Outcome Measure Options: AM-PAC 6 Clicks Daily Activity (OT), Optimal Instrument  AM-PAC 6 Clicks Score (PT): 12  AM-PAC 6 Clicks Score (OT): 14    Carson Inman PTA  5/20/2024

## 2024-05-20 NOTE — PLAN OF CARE
Goal Outcome Evaluation:  Plan of Care Reviewed With: patient        Progress: no change  Outcome Evaluation: Alert and oriented x4; able to make needs known to staff. Percocet, Baclofen and Tylenol administered for L)hip pain and H/A; see MAR for times given. Continues to refuse turns but assists during episodes of incontinence. Refused skin care to abdominal folds and legs. Call light within reach; care plan ongoing.

## 2024-05-20 NOTE — THERAPY TREATMENT NOTE
Patient Name: Jose Shaikh  : 1960    MRN: [id number]                              Today's Date: 2024       Admit Date: 2023    Visit Dx:     ICD-10-CM ICD-9-CM   1. Difficulty in walking  R26.2 719.7   2. Type 2 diabetes mellitus with other specified complication, unspecified whether long term insulin use  E11.69 250.80     Patient Active Problem List   Diagnosis    Diabetic ulcer of left heel associated with type 2 DM    Acute osteomyelitis of left calcaneus     Diabetic ulcer of left heel associated with type 2 DM    Diabetic ulcer of right midfoot associated with type 2 DM    Paroxysmal atrial fibrillation    Essential hypertension    Hyperlipidemia LDL goal <100    Cellulitis and abscess of foot    High alkaline phosphatase level    Osteomyelitis    Onychomycosis    Onychocryptosis    Foot pain, bilateral    Osteomyelitis of foot, right, acute    Cellulitis of right foot    Type 2 diabetes mellitus, with long-term current use of insulin    Class 3 severe obesity due to excess calories with serious comorbidity and body mass index (BMI) of 45.0 to 49.9 in adult    Anxiety disorder, unspecified    Claustrophobia    Dependence on wheelchair    Depression, unspecified    Long term (current) use of anticoagulants    Long term (current) use of oral hypoglycemic drugs    Wound of foot    Ulcer of right foot    Orthostatic hypotension    Other chronic osteomyelitis, right ankle and foot    Personal history of nicotine dependence    Thrombocytopenia, unspecified    Unspecified open wound, right foot, initial encounter    Diabetic foot infection    Subacute osteomyelitis of right foot    Right foot pain    Sepsis    Onychomycosis    Foot pain, left    Impaired mobility and ADLs    Absence of toe of right foot    Corns and callosity    Disability of walking    Fracture    Limb swelling    Polyneuropathy    Pressure ulcer, stage 1    Shortness of breath    Generalized weakness    Debility    Ulcerated,  foot, left, limited to breakdown of skin    Onychomycosis     Past Medical History:   Diagnosis Date    Absence of toe of right foot     Acute osteomyelitis of left calcaneus  08/18/2021    Anxiety and depression     Arthritis     Cancer     Chronic pain     STATES HIS PAIN IS 10/10 AAT    Claustrophobia     Corns and callus     Diabetic ulcer of left heel associated with type 2 DM 08/18/2021    Diabetic ulcer of left heel associated with type 2 DM 07/06/2021    Diabetic ulcer of right midfoot associated with type 2 DM 08/18/2021    Difficulty walking     Essential hypertension 08/31/2021    Hammertoe     Hyperlipidemia LDL goal <100 08/31/2021    Ingrown toenail     Obesity     Paroxysmal atrial fibrillation 08/31/2021    Polyneuropathy     Pressure ulcer, stage 1     Tinea unguium     Type 2 diabetes mellitus with polyneuropathy      Past Surgical History:   Procedure Laterality Date    CYST REMOVAL      center of back; benign    EYE SURGERY      INCISION AND DRAINAGE ABSCESS      back    INCISION AND DRAINAGE LEG Right 12/10/2021    Procedure: INCISION AND DRAINAGE LOWER EXTREMITY;  Surgeon: Ash Leyva DPM;  Location: [facility] ([facility]);  Service: Podiatry;  Laterality: Right;    OTHER SURGICAL HISTORY      Surgical clips left foot    TOE SURGERY Right     Removal of 5th toe    TRANS METATARSAL AMPUTATION Right 12/02/2021    Procedure: AMPUTATION TRANS METATARSAL;  Surgeon: Ash Leyva DPM;  Location: [facility] OR;  Service: Podiatry;  Laterality: Right;    VASCULAR SURGERY      WRIST SURGERY Left     repair of injury      General Information       Row Name 05/20/24 1255          OT Time and Intention    Document Type therapy note (daily note)  -PG     Mode of Treatment individual therapy;occupational therapy  -PG               User Key  (r) = Recorded By, (t) = Taken By, (c) = Cosigned By      Initials Name Provider Type    PG Kodak Matos, BEKAH Occupational Therapist                      Mobility/ADL's       Row Name 05/20/24 1255          Transfers    Transfers bed-chair transfer  -PG       Row Name 05/20/24 1255          Bed-Chair Transfer    Bed-Chair Brewer (Transfers) minimum assist (75% patient effort);2 person assist  -PG     Assistive Device (Bed-Chair Transfers) walker, front-wheeled  -PG               User Key  (r) = Recorded By, (t) = Taken By, (c) = Cosigned By      Initials Name Provider Type    PG Kodak Matos, OT Occupational Therapist                   Obj/Interventions    No documentation.                  Goals/Plan    No documentation.                  Clinical Impression       Row Name 05/20/24 1255          Plan of Care Review    Progress improving  -PG     Outcome Evaluation Patient completed recliner transfer with minimal assist x 2.  Patient was able to walk approximately 4 feet and then 2 feet with rest break in between.  Patient remains motivated and will continue to benefit from skilled OT services.  -PG               User Key  (r) = Recorded By, (t) = Taken By, (c) = Cosigned By      Initials Name Provider Type    PG Kodak Matos, OT Occupational Therapist                   Outcome Measures       Row Name 05/20/24 1256          How much help from another is currently needed...    Putting on and taking off regular lower body clothing? 1  -PG     Bathing (including washing, rinsing, and drying) 2  -PG     Toileting (which includes using toilet bed pan or urinal) 1  -PG     Putting on and taking off regular upper body clothing 3  -PG     Taking care of personal grooming (such as brushing teeth) 3  -PG     Eating meals 4  -PG     AM-PAC 6 Clicks Score (OT) 14  -PG       Row Name 05/20/24 0322          How much help from another person do you currently need...    Turning from your back to your side while in flat bed without using bedrails? 3  -TM     Moving from lying on back to sitting on the side of a flat bed without bedrails? 3  -TM     Moving to and from a bed to  a chair (including a wheelchair)? 2  -TM     Standing up from a chair using your arms (e.g., wheelchair, bedside chair)? 2  -TM     Climbing 3-5 steps with a railing? 1  -TM     To walk in hospital room? 1  -TM     AM-PAC 6 Clicks Score (PT) 12  -TM     Highest Level of Mobility Goal 4 --> Transfer to chair/commode  -TM       Row Name 05/20/24 1256          Functional Assessment    Outcome Measure Options AM-PAC 6 Clicks Daily Activity (OT);Optimal Instrument  -PG       Row Name 05/20/24 1256          Optimal Instrument    Optimal Instrument Optimal - 3  -PG     Bending/Stooping 4  -PG     Standing 3  -PG     Reaching 2  -PG               User Key  (r) = Recorded By, (t) = Taken By, (c) = Cosigned By      Initials Name Provider Type    TM Rylee Orellana, RN Registered Nurse    PG Kodak Matos OT Occupational Therapist                    Occupational Therapy Education       Title: PT OT SLP Therapies (Done)       Topic: Occupational Therapy (Done)       Point: ADL training (Done)       Description:   Instruct learner(s) on proper safety adaptation and remediation techniques during self care or transfers.   Instruct in proper use of assistive devices.                  Learning Progress Summary             Patient Acceptance, E,D, DU by PG at 4/24/2024 1039    Acceptance, E,TB, VU by RM at 2/14/2024 0518    Acceptance, E,TB, VU by RM at 1/9/2024 0420    Acceptance, E, VU by CR at 12/30/2023 1750    Acceptance, E,TB, VU by RM at 11/30/2023 0420    Acceptance, E,D, DU by PG at 11/7/2023 0932                         Point: Home exercise program (Done)       Description:   Instruct learner(s) on appropriate technique for monitoring, assisting and/or progressing therapeutic exercises/activities.                  Learning Progress Summary             Patient Acceptance, E,D, DU by PG at 4/24/2024 1039    Acceptance, E,TB, VU by RM at 2/14/2024 0518    Acceptance, E,TB, VU by RM at 1/9/2024 0420    Acceptance, E, VU by CR at  12/30/2023 1750    Acceptance, E,TB, VU by RM at 11/30/2023 0420    Acceptance, E,D, DU by PG at 11/7/2023 0932                         Point: Precautions (Done)       Description:   Instruct learner(s) on prescribed precautions during self-care and functional transfers.                  Learning Progress Summary             Patient Acceptance, E,D, DU by PG at 4/24/2024 1039    Acceptance, E,TB, VU by RM at 2/14/2024 0518    Acceptance, E,TB, VU by RM at 1/9/2024 0420    Acceptance, E, VU by CR at 12/30/2023 1750    Acceptance, E,TB, VU by RM at 11/30/2023 0420    Acceptance, E,D, DU by PG at 11/7/2023 0932                         Point: Body mechanics (Done)       Description:   Instruct learner(s) on proper positioning and spine alignment during self-care, functional mobility activities and/or exercises.                  Learning Progress Summary             Patient Acceptance, E,D, DU by PG at 4/24/2024 1039    Acceptance, E,TB, VU by RM at 2/14/2024 0518    Acceptance, E,TB, VU by RM at 1/9/2024 0420    Acceptance, E, VU by CR at 12/30/2023 1750    Acceptance, E,TB, VU by RM at 11/30/2023 0420    Acceptance, E,D, DU by PG at 11/7/2023 0932                                         User Key       Initials Effective Dates Name Provider Type Discipline     06/08/21 -  Bharath Berg, RN Registered Nurse Nurse    CR 06/13/22 -  Adilson Baker LPN Licensed Nurse Nurse    PG 06/16/21 -  Kodak Matos OT Occupational Therapist OT                  OT Recommendation and Plan  Planned Therapy Interventions (OT): activity tolerance training, BADL retraining, transfer/mobility retraining, patient/caregiver education/training, occupation/activity based interventions, strengthening exercise  Therapy Frequency (OT): 5 times/wk  Plan of Care Review  Plan of Care Reviewed With: patient  Progress: improving  Outcome Evaluation: Patient completed recliner transfer with minimal assist x 2.  Patient was able to walk approximately  4 feet and then 2 feet with rest break in between.  Patient remains motivated and will continue to benefit from skilled OT services.     Time Calculation:   Evaluation Complexity (OT)  Review Occupational Profile/Medical/Therapy History Complexity: brief/low complexity  Assessment, Occupational Performance/Identification of Deficit Complexity: 3-5 performance deficits  Clinical Decision Making Complexity (OT): problem focused assessment/low complexity  Overall Complexity of Evaluation (OT): low complexity     Time Calculation- OT       Row Name 05/20/24 1257             Time Calculation- OT    OT Received On 05/20/24  -PG      OT Goal Re-Cert Due Date 05/23/24  -PG         Timed Charges    65605 - OT Therapeutic Activity Minutes 25  -PG         Total Minutes    Timed Charges Total Minutes 25  -PG       Total Minutes 25  -PG                User Key  (r) = Recorded By, (t) = Taken By, (c) = Cosigned By      Initials Name Provider Type    PG Kodak Matos OT Occupational Therapist                  Therapy Charges for Today       Code Description Service Date Service Provider Modifiers Qty    [id number] HC OT THERAPEUTIC ACT EA 15 MIN 5/20/2024 Kodak Matos OT GO 2                 Kodak Matos OT  5/20/2024

## 2024-05-21 LAB
GLUCOSE BLDC GLUCOMTR-MCNC: 110 MG/DL (ref 70–99)
GLUCOSE BLDC GLUCOMTR-MCNC: 111 MG/DL (ref 70–99)
GLUCOSE BLDC GLUCOMTR-MCNC: 136 MG/DL (ref 70–99)
GLUCOSE BLDC GLUCOMTR-MCNC: 155 MG/DL (ref 70–99)

## 2024-05-21 PROCEDURE — 82948 REAGENT STRIP/BLOOD GLUCOSE: CPT

## 2024-05-21 PROCEDURE — 97116 GAIT TRAINING THERAPY: CPT

## 2024-05-21 PROCEDURE — 63710000001 INSULIN LISPRO (HUMAN) PER 5 UNITS: Performed by: PHYSICIAN ASSISTANT

## 2024-05-21 PROCEDURE — 82948 REAGENT STRIP/BLOOD GLUCOSE: CPT | Performed by: INTERNAL MEDICINE

## 2024-05-21 PROCEDURE — 97530 THERAPEUTIC ACTIVITIES: CPT

## 2024-05-21 PROCEDURE — 63710000001 INSULIN DETEMIR PER 5 UNITS: Performed by: PHYSICIAN ASSISTANT

## 2024-05-21 PROCEDURE — 63710000001 INSULIN GLARGINE PER 5 UNITS: Performed by: INTERNAL MEDICINE

## 2024-05-21 RX ADMIN — PREGABALIN 100 MG: 100 CAPSULE ORAL at 22:09

## 2024-05-21 RX ADMIN — LISINOPRIL 2.5 MG: 2.5 TABLET ORAL at 08:45

## 2024-05-21 RX ADMIN — Medication 500 MG: at 08:45

## 2024-05-21 RX ADMIN — DICLOFENAC SODIUM 4 G: 10 GEL TOPICAL at 13:00

## 2024-05-21 RX ADMIN — BACLOFEN 10 MG: 10 TABLET ORAL at 08:45

## 2024-05-21 RX ADMIN — CETIRIZINE HYDROCHLORIDE 10 MG: 10 TABLET, FILM COATED ORAL at 08:45

## 2024-05-21 RX ADMIN — PREGABALIN 100 MG: 100 CAPSULE ORAL at 08:45

## 2024-05-21 RX ADMIN — INSULIN GLARGINE 35 UNITS: 100 INJECTION, SOLUTION SUBCUTANEOUS at 22:10

## 2024-05-21 RX ADMIN — IBUPROFEN 400 MG: 400 TABLET ORAL at 16:42

## 2024-05-21 RX ADMIN — INSULIN DETEMIR 35 UNITS: 100 INJECTION, SOLUTION SUBCUTANEOUS at 08:44

## 2024-05-21 RX ADMIN — DICLOFENAC SODIUM 4 G: 10 GEL TOPICAL at 08:44

## 2024-05-21 RX ADMIN — OXYCODONE AND ACETAMINOPHEN 1 TABLET: 10; 325 TABLET ORAL at 22:10

## 2024-05-21 RX ADMIN — Medication 500 MG: at 22:09

## 2024-05-21 RX ADMIN — IBUPROFEN 400 MG: 400 TABLET ORAL at 02:47

## 2024-05-21 RX ADMIN — DICLOFENAC SODIUM 4 G: 10 GEL TOPICAL at 18:23

## 2024-05-21 RX ADMIN — ATORVASTATIN CALCIUM 10 MG: 10 TABLET, FILM COATED ORAL at 22:10

## 2024-05-21 RX ADMIN — INSULIN LISPRO 3 UNITS: 100 INJECTION, SOLUTION INTRAVENOUS; SUBCUTANEOUS at 22:17

## 2024-05-21 RX ADMIN — DICLOFENAC SODIUM 4 G: 10 GEL TOPICAL at 22:18

## 2024-05-21 RX ADMIN — OXYCODONE AND ACETAMINOPHEN 1 TABLET: 10; 325 TABLET ORAL at 08:45

## 2024-05-21 RX ADMIN — FUROSEMIDE 40 MG: 40 TABLET ORAL at 08:45

## 2024-05-21 RX ADMIN — SERTRALINE HYDROCHLORIDE 50 MG: 50 TABLET ORAL at 22:10

## 2024-05-21 RX ADMIN — FERROUS SULFATE TAB 325 MG (65 MG ELEMENTAL FE) 325 MG: 325 (65 FE) TAB at 08:45

## 2024-05-21 RX ADMIN — PREGABALIN 100 MG: 100 CAPSULE ORAL at 16:27

## 2024-05-21 RX ADMIN — ACETAMINOPHEN 650 MG: 325 TABLET ORAL at 00:10

## 2024-05-21 RX ADMIN — APIXABAN 5 MG: 5 TABLET, FILM COATED ORAL at 08:45

## 2024-05-21 RX ADMIN — CYANOCOBALAMIN TAB 500 MCG 1000 MCG: 500 TAB at 08:45

## 2024-05-21 RX ADMIN — Medication 10 MG: at 22:09

## 2024-05-21 RX ADMIN — APIXABAN 5 MG: 5 TABLET, FILM COATED ORAL at 22:10

## 2024-05-21 RX ADMIN — BACLOFEN 10 MG: 10 TABLET ORAL at 22:10

## 2024-05-21 RX ADMIN — EMPAGLIFLOZIN 10 MG: 10 TABLET, FILM COATED ORAL at 08:45

## 2024-05-21 NOTE — NURSING NOTE
Attempted to see patient today, however initially he was getting up with therapy. Upon return this afternoon, patient is resting in bed and requesting that we postpone visit until tomorrow.   Bettye Crews RN

## 2024-05-21 NOTE — THERAPY TREATMENT NOTE
SNF - Physical Therapy Treatment Note  KEO Dominguez     Patient Name: Jose Shaikh  : 1960  MRN: [id number]  Today's Date: 2024      Visit Dx:    ICD-10-CM ICD-9-CM   1. Difficulty in walking  R26.2 719.7   2. Type 2 diabetes mellitus with other specified complication, unspecified whether long term insulin use  E11.69 250.80     Patient Active Problem List   Diagnosis    Diabetic ulcer of left heel associated with type 2 DM    Acute osteomyelitis of left calcaneus     Diabetic ulcer of left heel associated with type 2 DM    Diabetic ulcer of right midfoot associated with type 2 DM    Paroxysmal atrial fibrillation    Essential hypertension    Hyperlipidemia LDL goal <100    Cellulitis and abscess of foot    High alkaline phosphatase level    Osteomyelitis    Onychomycosis    Onychocryptosis    Foot pain, bilateral    Osteomyelitis of foot, right, acute    Cellulitis of right foot    Type 2 diabetes mellitus, with long-term current use of insulin    Class 3 severe obesity due to excess calories with serious comorbidity and body mass index (BMI) of 45.0 to 49.9 in adult    Anxiety disorder, unspecified    Claustrophobia    Dependence on wheelchair    Depression, unspecified    Long term (current) use of anticoagulants    Long term (current) use of oral hypoglycemic drugs    Wound of foot    Ulcer of right foot    Orthostatic hypotension    Other chronic osteomyelitis, right ankle and foot    Personal history of nicotine dependence    Thrombocytopenia, unspecified    Unspecified open wound, right foot, initial encounter    Diabetic foot infection    Subacute osteomyelitis of right foot    Right foot pain    Sepsis    Onychomycosis    Foot pain, left    Impaired mobility and ADLs    Absence of toe of right foot    Corns and callosity    Disability of walking    Fracture    Limb swelling    Polyneuropathy    Pressure ulcer, stage 1    Shortness of breath    Generalized weakness    Debility    Ulcerated, foot,  left, limited to breakdown of skin    Onychomycosis     Past Medical History:   Diagnosis Date    Absence of toe of right foot     Acute osteomyelitis of left calcaneus  08/18/2021    Anxiety and depression     Arthritis     Cancer     Chronic pain     STATES HIS PAIN IS 10/10 AAT    Claustrophobia     Corns and callus     Diabetic ulcer of left heel associated with type 2 DM 08/18/2021    Diabetic ulcer of left heel associated with type 2 DM 07/06/2021    Diabetic ulcer of right midfoot associated with type 2 DM 08/18/2021    Difficulty walking     Essential hypertension 08/31/2021    [place]amm[place]oe     Hyperlipidemia LDL goal <100 08/31/2021    Ingrown toenail     Obesity     Paroxysmal atrial fibrillation 08/31/2021    Polyneuropathy     Pressure ulcer, stage 1     Tinea unguium     Type 2 diabetes mellitus with polyneuropathy      Past Surgical History:   Procedure Laterality Date    CYST REMOVAL      center of back; benign    EYE SURGERY      INCISION AND DRAINAGE ABSCESS      back    INCISION AND DRAINAGE LEG Right 12/10/2021    Procedure: INCISION AND DRAINAGE LOWER EXTREMITY;  Surgeon: Ash Leyva DPM;  Location: [facility] (formerly [facility]3];  Service: Podiatry;  Laterality: Right;    OTHER SURGICAL HISTORY      Surgical clips left foot    TOE SURGERY Right     Removal of 5th toe    TRANS METATARSAL AMPUTATION Right 12/02/2021    Procedure: AMPUTATION TRANS METATARSAL;  Surgeon: Ash Leyva DPM;  Location: [facility] OR;  Service: Podiatry;  Laterality: Right;    VASCULAR SURGERY      WRIST SURGERY Left     repair of injury       PT Assessment (Last 12 Hours)       PT Evaluation and Treatment       Row Name 05/21/24 1002          Physical Therapy Time and Intention    Subjective Information complains of;pain  -VK     Document Type therapy note (daily note)  -VK     Mode of Treatment individual therapy;physical therapy  -VK     Patient Effort good  -VK       Row Name 05/21/24 1002          General  Information    Patient Profile Reviewed yes  -VK     Existing Precautions/Restrictions fall  -VK       Row Name 05/21/24 1002          Pain    Pain Location - Side/Orientation Left  -     Pain Location - hip;knee  -VK     Pain Intervention(s) Nursing Notified;Repositioned;Rest;Ambulation/increased activity  -       Row Name 05/21/24 1002          Cognition    Affect/Mental Status (Cognition) WFL  -VK     Orientation Status (Cognition) oriented to;person;place;situation  -VK     Personal Safety Interventions nonskid shoes/slippers when out of bed;gait belt;fall prevention program maintained  -       Row Name 05/21/24 1002          Mobility    Extremity Weight-bearing Status left lower extremity;right lower extremity  -VK     Left Lower Extremity (Weight-bearing Status) weight-bearing as tolerated (WBAT)  -VK     Right Lower Extremity (Weight-bearing Status) weight-bearing as tolerated (WBAT)  -       Row Name 05/21/24 1002          Bed Mobility    Supine-Sit [place] (Bed Mobility) minimum assist (75% patient effort);1 person assist  -     Assistive Device (Bed Mobility) bed rails;draw sheet;overhead trapeze  -       Row Name 05/21/24 1002          Transfers    Transfers bed-chair transfer  -       Row Name 05/21/24 1002          Bed-Chair Transfer    Bed-Chair [place] (Transfers) minimum assist (75% patient effort);2 person assist;verbal cues  -     Assistive Device (Bed-Chair Transfers) walker, front-wheeled  -[facility] Name 05/21/24 1002          Sit-Stand Transfer    Sit-Stand [place] (Transfers) moderate assist (50% patient effort);2 person assist  -     Assistive Device (Sit-Stand Transfers) bariatric;walker, front-wheeled  -       Row Name 05/21/24 1002          Stand-Sit Transfer    Stand-Sit [place] (Transfers) moderate assist (50% patient effort);2 person assist  -     Assistive Device (Stand-Sit Transfers) bariatric;walker, front-wheeled  -       Row Name  05/21/24 1002          Gait/Stairs (Locomotion)    [place] Level (Gait) minimum assist (75% patient effort);moderate assist (50% patient effort);2 person assist;verbal cues  -VK     Assistive Device (Gait) bariatric equipment;walker, front-wheeled  -VK     Patient was able to Ambulate yes  -VK     Distance in Feet (Gait) --  2ftx2  -VK     Pattern (Gait) 4-point;step-to  -VK     Deviations/Abnormal Patterns (Gait) gait speed decreased;stride length decreased  -VK     Bilateral Gait Deviations weight shift ability decreased;heel strike decreased;forward flexed posture  -VK     Right Sided Gait Deviations weight shift ability decreased;heel strike decreased  -VK       Row Name 05/21/24 1002          Safety Issues, Functional Mobility    Impairments Affecting Function (Mobility) balance;endurance/activity tolerance;pain;strength  -VK       Row Name 05/21/24 1002          Balance    Dynamic Standing Balance minimal assist;2-person assist;verbal cues  -VK     Position/Device Used, Standing Balance other (see comments)  Bariatric walker  -VK     Balance Interventions sit to stand  -VK       Row Name             Wound 03/27/24 1045 Right anterior Skin Tear    Wound - Properties Group Placement Date: 03/27/24  -CR Placement Time: 1045  -CR Side: Right  -CR Orientation: anterior  -CR Primary Wound Type: Skin tear  -CR    Retired Wound - Properties Group Placement Date: 03/27/24  -CR Placement Time: 1045  -CR Side: Right  -CR Orientation: anterior  -CR Primary Wound Type: Skin tear  -CR    Retired Wound - Properties Group Date first assessed: 03/27/24  -CR Time first assessed: 1045  -CR Side: Right  -CR Primary Wound Type: Skin tear  -CR      Row Name             Wound 04/03/24 1730 Right anterior hip MASD (Moisture associated skin damage)    Wound - Properties Group Placement Date: 04/03/24  -NY Placement Time: 1730  -NY Side: Right  -NY Orientation: anterior  -NY Location: hip  -NY Primary Wound Type: MASD  -NY     Retired Wound - Properties Group Placement Date: 04/03/24  -OH Placement Time: 1730  -OH Side: Right  -OH Orientation: anterior  -OH Location: hip  -OH Primary Wound Type: MASD  -OH    Retired Wound - Properties Group Date first assessed: 04/03/24  -OH Time first assessed: 1730  -OH Side: Right  -OH Location: hip  -OH Primary Wound Type: MASD  -OH      Row Name             Wound 05/02/24 1033 Left posterior plantar    Wound - Properties Group Placement Date: 05/02/24  -KR Placement Time: 1033  -KR Side: Left  -KR Orientation: posterior  -KR Location: plantar  -KR    Retired Wound - Properties Group Placement Date: 05/02/24  -KR Placement Time: 1033  -KR Side: Left  -KR Orientation: posterior  -KR Location: plantar  -KR    Retired Wound - Properties Group Date first assessed: 05/02/24  -KR Time first assessed: 1033  -KR Side: Left  -KR Location: plantar  -KR      Row Name             Wound 05/09/24 2010 Right lateral abdomen Abrasion    Wound - Properties Group Placement Date: 05/09/24  -TM Placement Time: 2010  -TM Side: Right  -TM Orientation: lateral  -TM Location: abdomen  -TM Primary Wound Type: Abrasion  -TM    Retired Wound - Properties Group Placement Date: 05/09/24  -TM Placement Time: 2010  -TM Side: Right  -TM Orientation: lateral  -TM Location: abdomen  -TM Primary Wound Type: Abrasion  -TM    Retired Wound - Properties Group Date first assessed: 05/09/24  -TM Time first assessed: 2010  -TM Side: Right  -TM Location: abdomen  -TM Primary Wound Type: Abrasion  -TM      Row Name 05/21/24 1002          Positioning and Restraints    Pre-Treatment Position in bed  -VK     Post Treatment Position chair  -VK     In Chair reclined;call light within reach;encouraged to call for assist;notified nsg  Pt refuses alarms  -VK       Row Name 05/21/24 1002          Progress Summary (PT)    Progress Toward Functional Goals (PT) progress toward functional goals is fair  -VK               User Key  (r) = Recorded By, (t) =  Taken By, (c) = Cosigned By      Initials Name Provider Type    TM Rylee Orellana, RN Registered Nurse    Nicole Elise, RN Registered Nurse    Adilson Carmona LPN Licensed Nurse    Anya Palumbo PTA Physical Therapist Assistant    Almaz Morales, RN Registered Nurse                  Section G              Section GG                       Physical Therapy Education       Title: PT OT SLP Therapies (Done)       Topic: Physical Therapy (Done)       Point: Mobility training (Done)       Learning Progress Summary             Patient Acceptance, E,D, DU by PG at 4/24/2024 1039    Acceptance, E,TB, VU by RM at 2/14/2024 0518    Acceptance, E,TB, VU by RM at 1/9/2024 0420    Acceptance, E, VU by CR at 12/30/2023 1750    Acceptance, E, VU by CR at 12/20/2023 1345    Acceptance, E, VU by CR at 12/19/2023 1004    Acceptance, E,TB, VU by RM at 11/30/2023 0420    Acceptance, E,TB, VU by MOE at 11/8/2023 1341                         Point: Precautions (Done)       Learning Progress Summary             Patient Acceptance, E,D, DU by PG at 4/24/2024 1039    Acceptance, E,TB, VU by RM at 2/14/2024 0518    Acceptance, E,TB, VU by RM at 1/9/2024 0420    Acceptance, E, VU by CR at 12/30/2023 1750    Acceptance, E,TB, VU by RM at 11/30/2023 0420    Acceptance, E,TB, VU by MOE at 11/8/2023 1341                                         User Key       Initials Effective Dates Name Provider Type Discipline     06/08/21 -  Bharath Berg RN Registered Nurse Nurse    ROLY 06/13/22 -  Adilson Baker LPN Licensed Nurse Nurse    BOB 06/16/21 -  Kodak Matos OT Occupational Therapist OT    MOE 06/03/21 -  Merlin Rao PT Physical Therapist PT                  PT Recommendation and Plan     Progress Summary (PT)  Progress Toward Functional Goals (PT): progress toward functional goals is fair   Outcome Measures       Row Name 05/21/24 1200 05/20/24 1441          How much help from another person do you currently need...     Turning from your back to your side while in flat bed without using bedrails? 3  -VK 3  -WM     Moving from lying on back to sitting on the side of a flat bed without bedrails? 3  -VK 3  -WM     Moving to and from a bed to a chair (including a wheelchair)? 2  -VK 2  -WM     Standing up from a chair using your arms (e.g., wheelchair, bedside chair)? 2  -VK 2  -WM     Climbing 3-5 steps with a railing? 1  -VK 1  -WM     To walk in hospital room? 2  -VK 1  -WM     AM-PAC 6 Clicks Score (PT) 13  -VK 12  -WM     Highest Level of Mobility Goal 4 --> Transfer to chair/commode  -VK 4 --> Transfer to chair/commode  -WM               User Key  (r) = Recorded By, (t) = Taken By, (c) = Cosigned By      Initials Name Provider Type     Carson Inman PTA Physical Therapist Assistant    Anya Palumbo PTA Physical Therapist Assistant                     Time Calculation:    PT Charges       Row Name 05/21/24 1231             Time Calculation    PT Received On 05/21/24  -VK         Timed Charges    06803 - Gait Training Minutes  8  -VK      95932 - PT Therapeutic Activity Minutes 15  -VK         SNF Physical Therapy Minutes    Skilled Minutes- PT 23 min  -VK         Total Minutes    Timed Charges Total Minutes 23  -VK       Total Minutes 23  -VK                User Key  (r) = Recorded By, (t) = Taken By, (c) = Cosigned By      Initials Name Provider Type    Anya Palumbo PTA Physical Therapist Assistant                  Therapy Charges for Today       Code Description Service Date Service Provider Modifiers Qty    32782743994 HC GAIT TRAINING EA 15 MIN 5/21/2024 Anya Sam PTA GP 1    05271995959 HC PT THERAPEUTIC ACT EA 15 MIN 5/21/2024 Anya Sam PTA GP 1            PT G-Codes  Outcome Measure Options: AM-PAC 6 Clicks Daily Activity (OT), Optimal Instrument  AM-PAC 6 Clicks Score (PT): 13  AM-PAC 6 Clicks Score (OT): 14    Anya Sam PTA  5/21/2024

## 2024-05-21 NOTE — PLAN OF CARE
Goal Outcome Evaluation:  Plan of Care Reviewed With: patient        Progress: no change  Outcome Evaluation: Alert and oriented x4; able to call for assist as needed. No significant changes this shift. Medicated for pain as needed with Tylenol, Motrin, Baclofen and Percocet. Using urinal and bedpan as needed; incontinent of stool at times. Call light within reach; care plan ongoing.

## 2024-05-21 NOTE — THERAPY TREATMENT NOTE
SNF - Occupational Therapy Treatment Note  KEO Dominguez    Patient Name: Jose Shaikh  : 1960    MRN: [id number]                              Today's Date: 2024       Admit Date: 2023    Visit Dx:     ICD-10-CM ICD-9-CM   1. Difficulty in walking  R26.2 719.7   2. Type 2 diabetes mellitus with other specified complication, unspecified whether long term insulin use  E11.69 250.80     Patient Active Problem List   Diagnosis    Diabetic ulcer of left heel associated with type 2 DM    Acute osteomyelitis of left calcaneus     Diabetic ulcer of left heel associated with type 2 DM    Diabetic ulcer of right midfoot associated with type 2 DM    Paroxysmal atrial fibrillation    Essential hypertension    Hyperlipidemia LDL goal <100    Cellulitis and abscess of foot    High alkaline phosphatase level    Osteomyelitis    Onychomycosis    Onychocryptosis    Foot pain, bilateral    Osteomyelitis of foot, right, acute    Cellulitis of right foot    Type 2 diabetes mellitus, with long-term current use of insulin    Class 3 severe obesity due to excess calories with serious comorbidity and body mass index (BMI) of 45.0 to 49.9 in adult    Anxiety disorder, unspecified    Claustrophobia    Dependence on wheelchair    Depression, unspecified    Long term (current) use of anticoagulants    Long term (current) use of oral hypoglycemic drugs    Wound of foot    Ulcer of right foot    Orthostatic hypotension    Other chronic osteomyelitis, right ankle and foot    Personal history of nicotine dependence    Thrombocytopenia, unspecified    Unspecified open wound, right foot, initial encounter    Diabetic foot infection    Subacute osteomyelitis of right foot    Right foot pain    Sepsis    Onychomycosis    Foot pain, left    Impaired mobility and ADLs    Absence of toe of right foot    Corns and callosity    Disability of walking    Fracture    Limb swelling    Polyneuropathy    Pressure ulcer, stage 1    Shortness of  breath    Generalized weakness    Debility    Ulcerated, foot, left, limited to breakdown of skin    Onychomycosis     Past Medical History:   Diagnosis Date    Absence of toe of right foot     Acute osteomyelitis of left calcaneus  08/18/2021    Anxiety and depression     Arthritis     Cancer     Chronic pain     STATES HIS PAIN IS 10/10 AAT    Claustrophobia     Corns and callus     Diabetic ulcer of left heel associated with type 2 DM 08/18/2021    Diabetic ulcer of left heel associated with type 2 DM 07/06/2021    Diabetic ulcer of right midfoot associated with type 2 DM 08/18/2021    Difficulty walking     Essential hypertension 08/31/2021    Hammertoe     Hyperlipidemia LDL goal <100 08/31/2021    Ingrown toenail     Obesity     Paroxysmal atrial fibrillation 08/31/2021    Polyneuropathy     Pressure ulcer, stage 1     Tinea unguium     Type 2 diabetes mellitus with polyneuropathy      Past Surgical History:   Procedure Laterality Date    CYST REMOVAL      center of back; benign    EYE SURGERY      INCISION AND DRAINAGE ABSCESS      back    INCISION AND DRAINAGE LEG Right 12/10/2021    Procedure: INCISION AND DRAINAGE LOWER EXTREMITY;  Surgeon: Ash Leyva DPM;  Location: [facility] OR;  Service: Podiatry;  Laterality: Right;    OTHER SURGICAL HISTORY      Surgical clips left foot    TOE SURGERY Right     Removal of 5th toe    TRANS METATARSAL AMPUTATION Right 12/02/2021    Procedure: AMPUTATION TRANS METATARSAL;  Surgeon: Ash Leyva DPM;  Location: [facility] OR;  Service: Podiatry;  Laterality: Right;    VASCULAR SURGERY      WRIST SURGERY Left     repair of injury      General Information       Row Name 05/21/24 1103          OT Time and Intention    Document Type therapy note (daily note)  -PG     Mode of Treatment individual therapy;occupational therapy  -PG               User Key  (r) = Recorded By, (t) = Taken By, (c) = Cosigned By      Initials Name Provider Type    PG  Kodak Matos, OT Occupational Therapist                     Mobility/ADL's       Row Name 05/21/24 1103          Transfers    Transfers bed-chair transfer  -PG       Row Name 05/21/24 1103          Bed-Chair Transfer    Bed-Chair [place] (Transfers) minimum assist (75% patient effort);2 person assist;moderate assist (50% patient effort)  -PG       Row Name 05/21/24 1103          Sit-Stand Transfer    Sit-Stand [place] (Transfers) moderate assist (50% patient effort);2 person assist;minimum assist (75% patient effort)  -PG       Row Name 05/21/24 1103          Stand-Sit Transfer    Stand-Sit [place] (Transfers) moderate assist (50% patient effort);2 person assist;minimum assist (75% patient effort)  -PG               User Key  (r) = Recorded By, (t) = Taken By, (c) = Cosigned By      Initials Name Provider Type    PG Kodak Matos, BEKAH Occupational Therapist                   Obj/Interventions    No documentation.                  Goals/Plan    No documentation.                  Clinical Impression       Row Name 05/21/24 1103          Plan of Care Review    Progress no change  -PG               User Key  (r) = Recorded By, (t) = Taken By, (c) = Cosigned By      Initials Name Provider Type    PG Kodak Matos, BEKAH Occupational Therapist                   Outcome Measures       Row Name 05/21/24 1104          How much help from another is currently needed...    Putting on and taking off regular lower body clothing? 1  -PG     Bathing (including washing, rinsing, and drying) 2  -PG     Toileting (which includes using toilet bed pan or urinal) 1  -PG     Putting on and taking off regular upper body clothing 3  -PG     Taking care of personal grooming (such as brushing teeth) 3  -PG     Eating meals 4  -PG     AM-PAC 6 Clicks Score (OT) 14  -PG       Row Name 05/21/24 1104          Functional Assessment    Outcome Measure Options AM-PAC 6 Clicks Daily Activity (OT);Optimal Instrument  -PG               User  Key  (r) = Recorded By, (t) = Taken By, (c) = Cosigned By      Initials Name Provider Type    Kodak Velazco OT Occupational Therapist                  Section G              Section GG  SectionGG: Functional Ability/Goals, Adm  Self Care, Prior Functioning (QF9157C): 1. Dependent  Upper Extremity Range of Motion (MR3416S): No impairment  Self Care, Admission (Section GG)  Eating: Self-Care Admission Performance (UM7957H9): independent (06)  Oral Hygiene: Self-Care Admission Performance (UM8529O6): independent (06)  Toileting Hygiene: Self-Care Admission Performance (OM9405N7): dependent (01)  Shower/Bathe Self: Self-Care Admission Performance (AI2066A8): substantial/maximal assistance (02)  Upper Body Dressing: Self-Care Admission Performance (RI4013C3): setup or clean-up assistance (05)  Lower Body Dressing: Self-Care Admission Performance (PJ3396D9): dependent (01)  Putting On/Taking Off Footwear: Self-Care Admission Performance (LM7616R3): dependent (01)  Personal Hygiene: Self-Care Admission Performance (TV1598B8): dependent (01)  Mobility, Admission Performance (YJ4952)  Toilet Transfer: Mobility Admission Performance (PC3059Y7): not attempted, medical condition/safety concern (88)  Section GG: Functional Ability/Goals, DC  Eating: Self-Care Discharge Goal (FS1214S5): independent (06)  Oral Hygiene: Self-Care Discharge Goal (PG2520L5): independent (06)  Toileting Hygiene: Self-Care Discharge Goal (TM0765R2): partial/moderate assistance (03)  Shower/Bathe Self: Self-Care Discharge Goal (VH4419M9): partial/moderate assistance (03)  Upper Body Dressing: Self-Care Discharge Goal (ZY6353U2): independent (06)  Lower Body Dressing: Self-Care Discharge Goal (IK5661Y9): partial/moderate assistance (03)  Putting On/Taking Off Footwear: Self-Care Discharge Goal (XK8464I5): partial/moderate assistance (03)  Personal Hygiene: Self-Care Discharge Goal (MT4061O9): partial/moderate assistance (03)  Mobility (GG), Discharge  Goal (TH7237)  Toilet Transfer: Mobility Discharge Goal (RY8183K3): supervision or touching assistance (04)          Occupational Therapy Education       Title: PT OT SLP Therapies (Done)       Topic: Occupational Therapy (Done)       Point: ADL training (Done)       Description:   Instruct learner(s) on proper safety adaptation and remediation techniques during self care or transfers.   Instruct in proper use of assistive devices.                  Learning Progress Summary             Patient Acceptance, E,D, DU by PG at 4/24/2024 1039    Acceptance, E,TB, VU by RM at 2/14/2024 0518    Acceptance, E,TB, VU by RM at 1/9/2024 0420    Acceptance, E, VU by CR at 12/30/2023 1750    Acceptance, E,TB, VU by RM at 11/30/2023 0420    Acceptance, E,D, DU by PG at 11/7/2023 0932                         Point: Home exercise program (Done)       Description:   Instruct learner(s) on appropriate technique for monitoring, assisting and/or progressing therapeutic exercises/activities.                  Learning Progress Summary             Patient Acceptance, E,D, DU by PG at 4/24/2024 1039    Acceptance, E,TB, VU by RM at 2/14/2024 0518    Acceptance, E,TB, VU by RM at 1/9/2024 0420    Acceptance, E, VU by CR at 12/30/2023 1750    Acceptance, E,TB, VU by RM at 11/30/2023 0420    Acceptance, E,D, DU by PG at 11/7/2023 0932                         Point: Precautions (Done)       Description:   Instruct learner(s) on prescribed precautions during self-care and functional transfers.                  Learning Progress Summary             Patient Acceptance, E,D, DU by PG at 4/24/2024 1039    Acceptance, E,TB, VU by RM at 2/14/2024 0518    Acceptance, E,TB, VU by RM at 1/9/2024 0420    Acceptance, E, VU by CR at 12/30/2023 1750    Acceptance, E,TB, VU by RM at 11/30/2023 0420    Acceptance, E,D, DU by PG at 11/7/2023 0932                         Point: Body mechanics (Done)       Description:   Instruct learner(s) on proper positioning  and spine alignment during self-care, functional mobility activities and/or exercises.                  Learning Progress Summary             Patient Acceptance, E,D, DU by PG at 4/24/2024 1039    Acceptance, E,TB, VU by  at 2/14/2024 0518    Acceptance, E,TB, VU by  at 1/9/2024 0420    Acceptance, E, VU by CR at 12/30/2023 1750    Acceptance, E,TB, VU by RM at 11/30/2023 0420    Acceptance, E,D, DU by PG at 11/7/2023 0932                                         User Key       Initials Effective Dates Name Provider Type Discipline     06/08/21 -  Bharath Berg, RN Registered Nurse Nurse    CR 06/13/22 -  Adilson Baker LPN Licensed Nurse Nurse    PG 06/16/21 -  Kodak Matos OT Occupational Therapist OT                  OT Recommendation and Plan  Planned Therapy Interventions (OT): activity tolerance training, BADL retraining, transfer/mobility retraining, patient/caregiver education/training, occupation/activity based interventions, strengthening exercise  Therapy Frequency (OT): 5 times/wk  Plan of Care Review  Plan of Care Reviewed With: patient  Progress: no change  Outcome Evaluation: Patient completed recliner transfer with minimal assist x 2.  Patient was able to walk approximately 4 feet and then 2 feet with rest break in between.  Patient remains motivated and will continue to benefit from skilled OT services.     Time Calculation:   Evaluation Complexity (OT)  Review Occupational Profile/Medical/Therapy History Complexity: brief/low complexity  Assessment, Occupational Performance/Identification of Deficit Complexity: 3-5 performance deficits  Clinical Decision Making Complexity (OT): problem focused assessment/low complexity  Overall Complexity of Evaluation (OT): low complexity     Time Calculation- OT       Row Name 05/21/24 1104             Time Calculation- OT    OT Received On 05/21/24  -PG      OT Goal Re-Cert Due Date 05/23/24  -PG         Timed Charges    74886 - OT Therapeutic Activity  Minutes 25  -PG         SNF Occupational Therapy Minutes    Skilled Minutes- OT 25 min  -PG         Total Minutes    Timed Charges Total Minutes 25  -PG       Total Minutes 25  -PG                User Key  (r) = Recorded By, (t) = Taken By, (c) = Cosigned By      Initials Name Provider Type    PG Kodak Matos OT Occupational Therapist                  Therapy Charges for Today       Code Description Service Date Service Provider Modifiers Qty    24523478301 HC OT THERAPEUTIC ACT EA 15 MIN 5/20/2024 Kodak Matos OT GO 2    30891012751 HC OT THERAPEUTIC ACT EA 15 MIN 5/21/2024 Kodak Matos OT GO 2                 Kodak Matos OT  5/21/2024

## 2024-05-21 NOTE — PLAN OF CARE
Goal Outcome Evaluation:  Plan of Care Reviewed With: patient        Progress: improving  Outcome Evaluation: Resident alert, oriented, able to make needs known to staff. transfers with assist of therapist x2, transfers with floor staff assist x3. up in chair briefly today. Blood glucose stable for all 3 meals. Treatments done as ordered, resident tolerated well. resident pleasant and cooperative.

## 2024-05-22 LAB
ANION GAP SERPL CALCULATED.3IONS-SCNC: 7.6 MMOL/L (ref 5–15)
BUN SERPL-MCNC: 16 MG/DL (ref 8–23)
BUN/CREAT SERPL: 26.7 (ref 7–25)
CALCIUM SPEC-SCNC: 8 MG/DL (ref 8.6–10.5)
CHLORIDE SERPL-SCNC: 103 MMOL/L (ref 98–107)
CO2 SERPL-SCNC: 23.4 MMOL/L (ref 22–29)
CREAT SERPL-MCNC: 0.6 MG/DL (ref 0.76–1.27)
EGFRCR SERPLBLD CKD-EPI 2021: 107.1 ML/MIN/1.73
GLUCOSE BLDC GLUCOMTR-MCNC: 115 MG/DL (ref 70–99)
GLUCOSE BLDC GLUCOMTR-MCNC: 117 MG/DL (ref 70–99)
GLUCOSE BLDC GLUCOMTR-MCNC: 135 MG/DL (ref 70–99)
GLUCOSE BLDC GLUCOMTR-MCNC: 92 MG/DL (ref 70–99)
GLUCOSE SERPL-MCNC: 124 MG/DL (ref 65–99)
POTASSIUM SERPL-SCNC: 4.1 MMOL/L (ref 3.5–5.2)
SODIUM SERPL-SCNC: 134 MMOL/L (ref 136–145)

## 2024-05-22 PROCEDURE — 97116 GAIT TRAINING THERAPY: CPT

## 2024-05-22 PROCEDURE — 63710000001 INSULIN GLARGINE PER 5 UNITS: Performed by: INTERNAL MEDICINE

## 2024-05-22 PROCEDURE — 82948 REAGENT STRIP/BLOOD GLUCOSE: CPT

## 2024-05-22 PROCEDURE — 82948 REAGENT STRIP/BLOOD GLUCOSE: CPT | Performed by: INTERNAL MEDICINE

## 2024-05-22 PROCEDURE — 97110 THERAPEUTIC EXERCISES: CPT

## 2024-05-22 PROCEDURE — 97530 THERAPEUTIC ACTIVITIES: CPT

## 2024-05-22 PROCEDURE — 80048 BASIC METABOLIC PNL TOTAL CA: CPT | Performed by: PHYSICIAN ASSISTANT

## 2024-05-22 RX ADMIN — ATORVASTATIN CALCIUM 10 MG: 10 TABLET, FILM COATED ORAL at 21:54

## 2024-05-22 RX ADMIN — DICLOFENAC SODIUM 4 G: 10 GEL TOPICAL at 09:07

## 2024-05-22 RX ADMIN — APIXABAN 5 MG: 5 TABLET, FILM COATED ORAL at 09:06

## 2024-05-22 RX ADMIN — DICLOFENAC SODIUM 4 G: 10 GEL TOPICAL at 13:30

## 2024-05-22 RX ADMIN — INSULIN GLARGINE 35 UNITS: 100 INJECTION, SOLUTION SUBCUTANEOUS at 21:54

## 2024-05-22 RX ADMIN — IBUPROFEN 400 MG: 400 TABLET ORAL at 05:51

## 2024-05-22 RX ADMIN — Medication 500 MG: at 21:54

## 2024-05-22 RX ADMIN — CYANOCOBALAMIN TAB 500 MCG 1000 MCG: 500 TAB at 09:06

## 2024-05-22 RX ADMIN — EMPAGLIFLOZIN 10 MG: 10 TABLET, FILM COATED ORAL at 09:06

## 2024-05-22 RX ADMIN — PREGABALIN 100 MG: 100 CAPSULE ORAL at 21:54

## 2024-05-22 RX ADMIN — FUROSEMIDE 40 MG: 40 TABLET ORAL at 09:06

## 2024-05-22 RX ADMIN — INSULIN GLARGINE 35 UNITS: 100 INJECTION, SOLUTION SUBCUTANEOUS at 09:06

## 2024-05-22 RX ADMIN — CETIRIZINE HYDROCHLORIDE 10 MG: 10 TABLET, FILM COATED ORAL at 09:06

## 2024-05-22 RX ADMIN — FERROUS SULFATE TAB 325 MG (65 MG ELEMENTAL FE) 325 MG: 325 (65 FE) TAB at 09:06

## 2024-05-22 RX ADMIN — BACLOFEN 10 MG: 10 TABLET ORAL at 06:24

## 2024-05-22 RX ADMIN — OXYCODONE AND ACETAMINOPHEN 1 TABLET: 10; 325 TABLET ORAL at 06:24

## 2024-05-22 RX ADMIN — DICLOFENAC SODIUM 4 G: 10 GEL TOPICAL at 21:58

## 2024-05-22 RX ADMIN — SERTRALINE HYDROCHLORIDE 50 MG: 50 TABLET ORAL at 21:54

## 2024-05-22 RX ADMIN — APIXABAN 5 MG: 5 TABLET, FILM COATED ORAL at 21:54

## 2024-05-22 RX ADMIN — DICLOFENAC SODIUM 4 G: 10 GEL TOPICAL at 18:31

## 2024-05-22 RX ADMIN — PREGABALIN 100 MG: 100 CAPSULE ORAL at 15:26

## 2024-05-22 RX ADMIN — ACETAMINOPHEN 650 MG: 325 TABLET ORAL at 13:29

## 2024-05-22 RX ADMIN — OXYCODONE AND ACETAMINOPHEN 1 TABLET: 10; 325 TABLET ORAL at 15:27

## 2024-05-22 RX ADMIN — BACLOFEN 10 MG: 10 TABLET ORAL at 15:26

## 2024-05-22 RX ADMIN — PREGABALIN 100 MG: 100 CAPSULE ORAL at 09:06

## 2024-05-22 RX ADMIN — ACETAMINOPHEN 650 MG: 325 TABLET ORAL at 00:32

## 2024-05-22 RX ADMIN — ACETAMINOPHEN 650 MG: 325 TABLET ORAL at 21:16

## 2024-05-22 RX ADMIN — Medication 10 MG: at 21:54

## 2024-05-22 RX ADMIN — Medication 500 MG: at 09:06

## 2024-05-22 RX ADMIN — LISINOPRIL 2.5 MG: 2.5 TABLET ORAL at 09:06

## 2024-05-22 NOTE — THERAPY TREATMENT NOTE
Patient Name: Jose Shaikh  : 1960    MRN: [id number]                              Today's Date: 2024       Admit Date: 2023    Visit Dx:     ICD-10-CM ICD-9-CM   1. Difficulty in walking  R26.2 719.7   2. Type 2 diabetes mellitus with other specified complication, unspecified whether long term insulin use  E11.69 250.80     Patient Active Problem List   Diagnosis    Diabetic ulcer of left heel associated with type 2 DM    Acute osteomyelitis of left calcaneus     Diabetic ulcer of left heel associated with type 2 DM    Diabetic ulcer of right midfoot associated with type 2 DM    Paroxysmal atrial fibrillation    Essential hypertension    Hyperlipidemia LDL goal <100    Cellulitis and abscess of foot    High alkaline phosphatase level    Osteomyelitis    Onychomycosis    Onychocryptosis    Foot pain, bilateral    Osteomyelitis of foot, right, acute    Cellulitis of right foot    Type 2 diabetes mellitus, with long-term current use of insulin    Class 3 severe obesity due to excess calories with serious comorbidity and body mass index (BMI) of 45.0 to 49.9 in adult    Anxiety disorder, unspecified    Claustrophobia    Dependence on wheelchair    Depression, unspecified    Long term (current) use of anticoagulants    Long term (current) use of oral hypoglycemic drugs    Wound of foot    Ulcer of right foot    Orthostatic hypotension    Other chronic osteomyelitis, right ankle and foot    Personal history of nicotine dependence    Thrombocytopenia, unspecified    Unspecified open wound, right foot, initial encounter    Diabetic foot infection    Subacute osteomyelitis of right foot    Right foot pain    Sepsis    Onychomycosis    Foot pain, left    Impaired mobility and ADLs    Absence of toe of right foot    Corns and callosity    Disability of walking    Fracture    Limb swelling    Polyneuropathy    Pressure ulcer, stage 1    Shortness of breath    Generalized weakness    Debility    Ulcerated,  foot, left, limited to breakdown of skin    Onychomycosis     Past Medical History:   Diagnosis Date    Absence of toe of right foot     Acute osteomyelitis of left calcaneus  08/18/2021    Anxiety and depression     Arthritis     Cancer     Chronic pain     STATES HIS PAIN IS 10/10 AAT    Claustrophobia     Corns and callus     Diabetic ulcer of left heel associated with type 2 DM 08/18/2021    Diabetic ulcer of left heel associated with type 2 DM 07/06/2021    Diabetic ulcer of right midfoot associated with type 2 DM 08/18/2021    Difficulty walking     Essential hypertension 08/31/2021    Hammertoe     Hyperlipidemia LDL goal <100 08/31/2021    Ingrown toenail     Obesity     Paroxysmal atrial fibrillation 08/31/2021    Polyneuropathy     Pressure ulcer, stage 1     Tinea unguium     Type 2 diabetes mellitus with polyneuropathy      Past Surgical History:   Procedure Laterality Date    CYST REMOVAL      center of back; benign    EYE SURGERY      INCISION AND DRAINAGE ABSCESS      back    INCISION AND DRAINAGE LEG Right 12/10/2021    Procedure: INCISION AND DRAINAGE LOWER EXTREMITY;  Surgeon: Ash Leyva DPM;  Location: [facility];  Service: Podiatry;  Laterality: Right;    OTHER SURGICAL HISTORY      Surgical clips left foot    TOE SURGERY Right     Removal of 5th toe    TRANS METATARSAL AMPUTATION Right 12/02/2021    Procedure: AMPUTATION TRANS METATARSAL;  Surgeon: Ash Leyva DPM;  Location: [facility] OR;  Service: Podiatry;  Laterality: Right;    VASCULAR SURGERY      WRIST SURGERY Left     repair of injury      General Information       Row Name 05/22/24 1252          OT Time and Intention    Document Type therapy note (daily note)  -PG     Mode of Treatment individual therapy;occupational therapy  -PG               User Key  (r) = Recorded By, (t) = Taken By, (c) = Cosigned By      Initials Name Provider Type    PG Kodak Matos, BEKAH Occupational Therapist                      Mobility/ADL's       Row Name 05/22/24 1252          Bed-Chair Transfer    Bed-Chair [place] (Transfers) moderate assist (50% patient effort);2 person assist  -PG     Assistive Device (Bed-Chair Transfers) walker, front-wheeled  -PG       Row Name 05/22/24 1252          Sit-Stand Transfer    Sit-Stand [place] (Transfers) moderate assist (50% patient effort);2 person assist  -PG     Assistive Device (Sit-Stand Transfers) bariatric;walker, front-wheeled  -PG       Row Name 05/22/24 1252          Stand-Sit Transfer    Stand-Sit [place] (Transfers) moderate assist (50% patient effort);2 person assist;verbal cues  -PG     Assistive Device (Stand-Sit Transfers) bariatric  -PG               User Key  (r) = Recorded By, (t) = Taken By, (c) = Cosigned By      Initials Name Provider Type    PG Kodak Matos OT Occupational Therapist                   Obj/Interventions       Row Name 05/22/24 1253          Motor Skills    Therapeutic Exercise aerobic  -PG       Row Name 05/22/24 1253          Aerobic Exercise    Comment, Aerobic Exercise (Therapeutic Exercise) 10 minutes omnicycle  -PG               User Key  (r) = Recorded By, (t) = Taken By, (c) = Cosigned By      Initials Name Provider Type    PG Kodak Matos OT Occupational Therapist                   Goals/Plan    No documentation.                  Clinical Impression       Row Name 05/22/24 1254          Plan of Care Review    Progress no change  -PG               User Key  (r) = Recorded By, (t) = Taken By, (c) = Cosigned By      Initials Name Provider Type    PG Kodak Matos OT Occupational Therapist                   Outcome Measures       Row Name 05/22/24 1254          How much help from another is currently needed...    Putting on and taking off regular lower body clothing? 1  -PG     Bathing (including washing, rinsing, and drying) 2  -PG     Toileting (which includes using toilet bed pan or urinal) 1  -PG     Putting on and taking off regular  upper body clothing 3  -PG     Taking care of personal grooming (such as brushing teeth) 3  -PG     Eating meals 4  -PG     AM-PAC 6 Clicks Score (OT) 14  -PG       Row Name 05/22/24 1254          Functional Assessment    Outcome Measure Options AM-PAC 6 Clicks Daily Activity (OT);Optimal Instrument  -PG       Row Name 05/22/24 1254          Optimal Instrument    Optimal Instrument Optimal - 3  -PG     Bending/Stooping 4  -PG     Standing 3  -PG     Reaching 1  -PG               User Key  (r) = Recorded By, (t) = Taken By, (c) = Cosigned By      Initials Name Provider Type    PG Kodak Matos OT Occupational Therapist                    Occupational Therapy Education       Title: PT OT SLP Therapies (Done)       Topic: Occupational Therapy (Done)       Point: ADL training (Done)       Description:   Instruct learner(s) on proper safety adaptation and remediation techniques during self care or transfers.   Instruct in proper use of assistive devices.                  Learning Progress Summary             Patient Acceptance, E,D, DU by PG at 4/24/2024 1039    Acceptance, E,TB, VU by RM at 2/14/2024 0518    Acceptance, E,TB, VU by RM at 1/9/2024 0420    Acceptance, E, VU by CR at 12/30/2023 1750    Acceptance, E,TB, VU by RM at 11/30/2023 0420    Acceptance, E,D, DU by PG at 11/7/2023 0932                         Point: Home exercise program (Done)       Description:   Instruct learner(s) on appropriate technique for monitoring, assisting and/or progressing therapeutic exercises/activities.                  Learning Progress Summary             Patient Acceptance, E,D, DU by PG at 4/24/2024 1039    Acceptance, E,TB, VU by RM at 2/14/2024 0518    Acceptance, E,TB, VU by RM at 1/9/2024 0420    Acceptance, E, VU by CR at 12/30/2023 1750    Acceptance, E,TB, VU by RM at 11/30/2023 0420    Acceptance, E,D, DU by PG at 11/7/2023 0932                         Point: Precautions (Done)       Description:   Instruct learner(s)  on prescribed precautions during self-care and functional transfers.                  Learning Progress Summary             Patient Acceptance, E,D, DU by PG at 4/24/2024 1039    Acceptance, E,TB, VU by RM at 2/14/2024 0518    Acceptance, E,TB, VU by RM at 1/9/2024 0420    Acceptance, E, VU by CR at 12/30/2023 1750    Acceptance, E,TB, VU by RM at 11/30/2023 0420    Acceptance, E,D, DU by PG at 11/7/2023 0932                         Point: Body mechanics (Done)       Description:   Instruct learner(s) on proper positioning and spine alignment during self-care, functional mobility activities and/or exercises.                  Learning Progress Summary             Patient Acceptance, E,D, DU by PG at 4/24/2024 1039    Acceptance, E,TB, VU by RM at 2/14/2024 0518    Acceptance, E,TB, VU by RM at 1/9/2024 0420    Acceptance, E, VU by CR at 12/30/2023 1750    Acceptance, E,TB, VU by RM at 11/30/2023 0420    Acceptance, E,D, DU by PG at 11/7/2023 0932                                         User Key       Initials Effective Dates Name Provider Type Discipline     06/08/21 -  Bharath Berg, RN Registered Nurse Nurse    CR 06/13/22 -  Adilson Baker LPN Licensed Nurse Nurse    PG 06/16/21 -  Kodak Matos OT Occupational Therapist OT                  OT Recommendation and Plan  Planned Therapy Interventions (OT): activity tolerance training, BADL retraining, transfer/mobility retraining, patient/caregiver education/training, occupation/activity based interventions, strengthening exercise  Therapy Frequency (OT): 5 times/wk  Plan of Care Review  Plan of Care Reviewed With: patient  Progress: no change  Outcome Evaluation: Patient completed recliner transfer with minimal assist x 2.  Patient was able to walk approximately 4 feet and then 2 feet with rest break in between.  Patient remains motivated and will continue to benefit from skilled OT services.     Time Calculation:   Evaluation Complexity (OT)  Review  Occupational Profile/Medical/Therapy History Complexity: brief/low complexity  Assessment, Occupational Performance/Identification of Deficit Complexity: 3-5 performance deficits  Clinical Decision Making Complexity (OT): problem focused assessment/low complexity  Overall Complexity of Evaluation (OT): low complexity     Time Calculation- OT       Row Name 05/22/24 1255             Time Calculation- OT    OT Received On 05/22/24  -PG      OT Goal Re-Cert Due Date 05/23/24  -PG         Timed Charges    99187 - OT Therapeutic Exercise Minutes 15  -PG      13533 - OT Therapeutic Activity Minutes 25  -PG         SNF Occupational Therapy Minutes    Skilled Minutes- OT 40 min  -PG         Total Minutes    Timed Charges Total Minutes 40  -PG       Total Minutes 40  -PG                User Key  (r) = Recorded By, (t) = Taken By, (c) = Cosigned By      Initials Name Provider Type    PG Kodak Matos OT Occupational Therapist                  Therapy Charges for Today       Code Description Service Date Service Provider Modifiers Qty    11230159370 HC OT THERAPEUTIC ACT EA 15 MIN 5/21/2024 Kodak Matos OT GO 2    84878194990 HC OT THER PROC EA 15 MIN 5/22/2024 Kodak Matos OT GO 1    85655707773 HC OT THERAPEUTIC ACT EA 15 MIN 5/22/2024 Kodak Matos OT GO 2                 Kodak Matos OT  5/22/2024

## 2024-05-22 NOTE — PLAN OF CARE
Goal Outcome Evaluation care plan reviewed with RSD, pleasant to staff, AAOX4, makes needs known to staff. Pain meds BACLOFEN AND OXYCODONE GIVEN X2.   IBUPROPHEN X1. CALL LIGHT AND PERSONAL ITEMS WITHIN REACH AT BEDSIDE.

## 2024-05-22 NOTE — THERAPY TREATMENT NOTE
SNF - Physical Therapy Treatment Note  KEO Dominguez     Patient Name: Jose Shaikh  : 1960  MRN: [id number]  Today's Date: 2024      Visit Dx:    ICD-10-CM ICD-9-CM   1. Difficulty in walking  R26.2 719.7   2. Type 2 diabetes mellitus with other specified complication, unspecified whether long term insulin use  E11.69 250.80     Patient Active Problem List   Diagnosis    Diabetic ulcer of left heel associated with type 2 DM    Acute osteomyelitis of left calcaneus     Diabetic ulcer of left heel associated with type 2 DM    Diabetic ulcer of right midfoot associated with type 2 DM    Paroxysmal atrial fibrillation    Essential hypertension    Hyperlipidemia LDL goal <100    Cellulitis and abscess of foot    High alkaline phosphatase level    Osteomyelitis    Onychomycosis    Onychocryptosis    Foot pain, bilateral    Osteomyelitis of foot, right, acute    Cellulitis of right foot    Type 2 diabetes mellitus, with long-term current use of insulin    Class 3 severe obesity due to excess calories with serious comorbidity and body mass index (BMI) of 45.0 to 49.9 in adult    Anxiety disorder, unspecified    Claustrophobia    Dependence on wheelchair    Depression, unspecified    Long term (current) use of anticoagulants    Long term (current) use of oral hypoglycemic drugs    Wound of foot    Ulcer of right foot    Orthostatic hypotension    Other chronic osteomyelitis, right ankle and foot    Personal history of nicotine dependence    Thrombocytopenia, unspecified    Unspecified open wound, right foot, initial encounter    Diabetic foot infection    Subacute osteomyelitis of right foot    Right foot pain    Sepsis    Onychomycosis    Foot pain, left    Impaired mobility and ADLs    Absence of toe of right foot    Corns and callosity    Disability of walking    Fracture    Limb swelling    Polyneuropathy    Pressure ulcer, stage 1    Shortness of breath    Generalized weakness    Debility    Ulcerated, foot,  left, limited to breakdown of skin    Onychomycosis     Past Medical History:   Diagnosis Date    Absence of toe of right foot     Acute osteomyelitis of left calcaneus  08/18/2021    Anxiety and depression     Arthritis     Cancer     Chronic pain     STATES HIS PAIN IS 10/10 AAT    Claustrophobia     Corns and callus     Diabetic ulcer of left heel associated with type 2 DM 08/18/2021    Diabetic ulcer of left heel associated with type 2 DM 07/06/2021    Diabetic ulcer of right midfoot associated with type 2 DM 08/18/2021    Difficulty walking     Essential hypertension 08/31/2021    Hammertoe     Hyperlipidemia LDL goal <100 08/31/2021    Ingrown toenail     Obesity     Paroxysmal atrial fibrillation 08/31/2021    Polyneuropathy     Pressure ulcer, stage 1     Tinea unguium     Type 2 diabetes mellitus with polyneuropathy      Past Surgical History:   Procedure Laterality Date    CYST REMOVAL      center of back; benign    EYE SURGERY      INCISION AND DRAINAGE ABSCESS      back    INCISION AND DRAINAGE LEG Right 12/10/2021    Procedure: INCISION AND DRAINAGE LOWER EXTREMITY;  Surgeon: Ash Leyva DPM;  Location: [facility];  Service: Podiatry;  Laterality: Right;    OTHER SURGICAL HISTORY      Surgical clips left foot    TOE SURGERY Right     Removal of 5th toe    TRANS METATARSAL AMPUTATION Right 12/02/2021    Procedure: AMPUTATION TRANS METATARSAL;  Surgeon: Ash Leyva DPM;  Location: [facility] OR;  Service: Podiatry;  Laterality: Right;    VASCULAR SURGERY      WRIST SURGERY Left     repair of injury       PT Assessment (Last 12 Hours)       PT Evaluation and Treatment       Row Name 05/22/24 0959          Physical Therapy Time and Intention    Subjective Information complains of;pain  -VK     Document Type therapy note (daily note)  -VK     Mode of Treatment individual therapy;physical therapy  -VK     Patient Effort good  -VK       Row Name 05/22/24 0959          General  Information    Patient Profile Reviewed yes  -VK     Existing Precautions/Restrictions fall  -VK       Row Name 05/22/24 0959          Pain    Pain Location - Side/Orientation Left  -VK     Pain Location - hip;knee  -VK     Pain Intervention(s) Nursing Notified;Repositioned;Rest  -VK       Row Name 05/22/24 0959          Cognition    Affect/Mental Status (Cognition) WFL  -VK     Behavioral Issues (Cognition) difficulty managing stress  -VK     Orientation Status (Cognition) oriented to;person;place;situation  -VK     Personal Safety Interventions nonskid shoes/slippers when out of bed;gait belt;fall prevention program maintained  -VK       Row Name 05/22/24 0959          Mobility    Extremity Weight-bearing Status left lower extremity;right lower extremity  -VK     Left Lower Extremity (Weight-bearing Status) weight-bearing as tolerated (WBAT)  -VK     Right Lower Extremity (Weight-bearing Status) weight-bearing as tolerated (WBAT)  -VK       Row Name 05/22/24 0959          Bed Mobility    Supine-Sit [place] (Bed Mobility) minimum assist (75% patient effort);moderate assist (50% patient effort);1 person assist;verbal cues  -VK     Bed Mobility, Safety Issues decreased use of legs for bridging/pushing  -VK     Assistive Device (Bed Mobility) bed rails;draw sheet;overhead trapeze  -VK       Row Name 05/22/24 0959          Transfers    Transfers bed-chair transfer;sit-stand transfer;stand-sit transfer  -       Row Name 05/22/24 0959          Bed-Chair Transfer    Bed-Chair [place] (Transfers) moderate assist (50% patient effort);2 person assist  -VK     Assistive Device (Bed-Chair Transfers) bariatric;walker, front-wheeled  -VK       Row Name 05/22/24 0959          Sit-Stand Transfer    Sit-Stand [place] (Transfers) moderate assist (50% patient effort);2 person assist  -VK     Assistive Device (Sit-Stand Transfers) bariatric;walker, front-wheeled  -VK       Row Name 05/22/24 0959          Stand-Sit  "Transfer    Stand-Sit [place] (Transfers) moderate assist (50% patient effort);2 person assist  -VK     Assistive Device (Stand-Sit Transfers) bariatric;walker, front-wheeled  -VK       Row Name 05/22/24 0959          Gait/Stairs (Locomotion)    [place] Level (Gait) moderate assist (50% patient effort);2 person assist;verbal cues  -VK     Assistive Device (Gait) bariatric equipment;walker, front-wheeled  -VK     Patient was able to Ambulate yes  -VK     Distance in Feet (Gait) --  2ftx2  -VK     Pattern (Gait) 4-point;step-to  -VK     Deviations/Abnormal Patterns (Gait) gait speed decreased;stride length decreased  -VK     Bilateral Gait Deviations weight shift ability decreased;heel strike decreased;forward flexed posture  -VK     Right Sided Gait Deviations weight shift ability decreased;heel strike decreased  -VK     Gait Assessment/Intervention Pt became very anxious throughout ambulation today and complained of severe hip pain, repeating 'I cant do this\".  -VK       Row Name 05/22/24 0959          Safety Issues, Functional Mobility    Impairments Affecting Function (Mobility) balance;endurance/activity tolerance;pain;strength  -VK       Row Name 05/22/24 0959          Balance    Dynamic Standing Balance moderate assist;2-person assist;verbal cues  -VK     Position/Device Used, Standing Balance other (see comments)  Bariatric walker  -VK     Balance Interventions sit to stand  x3  -VK       Row Name             Wound 03/27/24 1045 Right anterior Skin Tear    Wound - Properties Group Placement Date: 03/27/24  -CR Placement Time: 1045  -CR Side: Right  -CR Orientation: anterior  -CR Primary Wound Type: Skin tear  -CR    Retired Wound - Properties Group Placement Date: 03/27/24  -CR Placement Time: 1045  -CR Side: Right  -CR Orientation: anterior  -CR Primary Wound Type: Skin tear  -CR    Retired Wound - Properties Group Date first assessed: 03/27/24  -CR Time first assessed: 1045  -CR Side: Right  -CR " Primary Wound Type: Skin tear  -CR      Row Name             Wound 04/03/24 1730 Right anterior hip MASD (Moisture associated skin damage)    Wound - Properties Group Placement Date: 04/03/24  -NY Placement Time: 1730  -NY Side: Right  -NY Orientation: anterior  -NY Location: hip  -NY Primary Wound Type: MASD  -NY    Retired Wound - Properties Group Placement Date: 04/03/24  -NY Placement Time: 1730  -NY Side: Right  -NY Orientation: anterior  -NY Location: hip  -NY Primary Wound Type: MASD  -NY    Retired Wound - Properties Group Date first assessed: 04/03/24  -NY Time first assessed: 1730  -NY Side: Right  -NY Location: hip  -NY Primary Wound Type: MASD  -NY      Row Name             Wound 05/02/24 1033 Left posterior plantar    Wound - Properties Group Placement Date: 05/02/24  -KR Placement Time: 1033  -KR Side: Left  -KR Orientation: posterior  -KR Location: plantar  -KR    Retired Wound - Properties Group Placement Date: 05/02/24  -KR Placement Time: 1033  -KR Side: Left  -KR Orientation: posterior  -KR Location: plantar  -KR    Retired Wound - Properties Group Date first assessed: 05/02/24  -KR Time first assessed: 1033  -KR Side: Left  -KR Location: plantar  -KR      Row Name             Wound 05/09/24 2010 Right lateral abdomen Abrasion    Wound - Properties Group Placement Date: 05/09/24  -TM Placement Time: 2010 -TM Side: Right  -TM Orientation: lateral  -TM Location: abdomen  -TM Primary Wound Type: Abrasion  -TM    Retired Wound - Properties Group Placement Date: 05/09/24  -TM Placement Time: 2010 -TM Side: Right  -TM Orientation: lateral  -TM Location: abdomen  -TM Primary Wound Type: Abrasion  -TM    Retired Wound - Properties Group Date first assessed: 05/09/24  -TM Time first assessed: 2010 -TM Side: Right  -TM Location: abdomen  -TM Primary Wound Type: Abrasion  -TM      Row Name 05/22/24 0959          Positioning and Restraints    Pre-Treatment Position in bed  -VK     Post Treatment Position  chair  -VK     In Chair reclined;call light within reach;encouraged to call for assist;exit alarm on  -VK       Row Name 05/22/24 0959          Progress Summary (PT)    Progress Toward Functional Goals (PT) progress toward functional goals is gradual  -VK               User Key  (r) = Recorded By, (t) = Taken By, (c) = Cosigned By      Initials Name Provider Type    TM Rylee Orellana, RN Registered Nurse    Nicole Elise, RN Registered Nurse    Adilson Carmona LPN Licensed Nurse    Anya Palumbo PTA Physical Therapist Assistant    Almaz Morales RN Registered Nurse                  Section G              Section GG                       Physical Therapy Education       Title: PT OT SLP Therapies (Done)       Topic: Physical Therapy (Done)       Point: Mobility training (Done)       Learning Progress Summary             Patient Acceptance, E,D, DU by PG at 4/24/2024 1039    Acceptance, E,TB, VU by RM at 2/14/2024 0518    Acceptance, E,TB, VU by RM at 1/9/2024 0420    Acceptance, E, VU by CR at 12/30/2023 1750    Acceptance, E, VU by CR at 12/20/2023 1345    Acceptance, E, VU by CR at 12/19/2023 1004    Acceptance, E,TB, VU by RM at 11/30/2023 0420    Acceptance, E,TB, VU by MOE at 11/8/2023 1341                         Point: Precautions (Done)       Learning Progress Summary             Patient Acceptance, E,D, DU by PG at 4/24/2024 1039    Acceptance, E,TB, VU by RM at 2/14/2024 0518    Acceptance, E,TB, VU by RM at 1/9/2024 0420    Acceptance, E, VU by CR at 12/30/2023 1750    Acceptance, E,TB, VU by RM at 11/30/2023 0420    Acceptance, E,TB, VU by MOE at 11/8/2023 1341                                         User Key       Initials Effective Dates Name Provider Type Discipline     06/08/21 -  Bharath Berg, RN Registered Nurse Nurse    CR 06/13/22 -  Adilson Baker LPN Licensed Nurse Nurse    BOB 06/16/21 -  Kodak Matos OT Occupational Therapist OT    MOE 06/03/21 -  Merlin Rao, PT  Physical Therapist PT                  PT Recommendation and Plan     Progress Summary (PT)  Progress Toward Functional Goals (PT): progress toward functional goals is gradual   Outcome Measures       Row Name 05/22/24 1300 05/21/24 1200 05/20/24 1441       How much help from another person do you currently need...    Turning from your back to your side while in flat bed without using bedrails? 3  -VK 3  -VK 3  -WM    Moving from lying on back to sitting on the side of a flat bed without bedrails? 3  -VK 3  -VK 3  -WM    Moving to and from a bed to a chair (including a wheelchair)? 2  -VK 2  -VK 2  -WM    Standing up from a chair using your arms (e.g., wheelchair, bedside chair)? 2  -VK 2  -VK 2  -WM    Climbing 3-5 steps with a railing? 1  -VK 1  -VK 1  -WM    To walk in hospital room? 2  -VK 2  -VK 1  -WM    AM-PAC 6 Clicks Score (PT) 13  -VK 13  -VK 12  -WM    Highest Level of Mobility Goal 4 --> Transfer to chair/commode  -VK 4 --> Transfer to chair/commode  -VK 4 --> Transfer to chair/commode  -WM              User Key  (r) = Recorded By, (t) = Taken By, (c) = Cosigned By      Initials Name Provider Type    Carson Johnson PTA Physical Therapist Assistant    Anya Palumbo PTA Physical Therapist Assistant                     Time Calculation:    PT Charges       Row Name 05/22/24 1310             Time Calculation    PT Received On 05/22/24  -VK         Timed Charges    88504 - Gait Training Minutes  8  -VK      16791 - PT Therapeutic Activity Minutes 16  -VK         SNF Physical Therapy Minutes    Skilled Minutes- PT 24 min  -VK         Total Minutes    Timed Charges Total Minutes 24  -VK       Total Minutes 24  -VK                User Key  (r) = Recorded By, (t) = Taken By, (c) = Cosigned By      Initials Name Provider Type    Anya Palumbo PTA Physical Therapist Assistant                  Therapy Charges for Today       Code Description Service Date Service Provider Modifiers Qty    73153966370  HC GAIT TRAINING EA 15 MIN 5/21/2024 Anya Sam, PTA GP 1    86010057734 HC PT THERAPEUTIC ACT EA 15 MIN 5/21/2024 Anya Sam, PTA GP 1    32051156445 HC PT THERAPEUTIC ACT EA 15 MIN 5/22/2024 Anya Sam, PTA GP 1    68403924870 HC GAIT TRAINING EA 15 MIN 5/22/2024 Anya Sam, PTA GP 1            PT G-Codes  Outcome Measure Options: AM-PAC 6 Clicks Daily Activity (OT), Optimal Instrument  AM-PAC 6 Clicks Score (PT): 13  AM-PAC 6 Clicks Score (OT): 14    Anya Sam PTA  5/22/2024

## 2024-05-23 LAB
GLUCOSE BLDC GLUCOMTR-MCNC: 104 MG/DL (ref 70–99)
GLUCOSE BLDC GLUCOMTR-MCNC: 107 MG/DL (ref 70–99)
GLUCOSE BLDC GLUCOMTR-MCNC: 146 MG/DL (ref 70–99)
GLUCOSE BLDC GLUCOMTR-MCNC: 158 MG/DL (ref 70–99)

## 2024-05-23 PROCEDURE — 82948 REAGENT STRIP/BLOOD GLUCOSE: CPT | Performed by: INTERNAL MEDICINE

## 2024-05-23 PROCEDURE — 97110 THERAPEUTIC EXERCISES: CPT

## 2024-05-23 PROCEDURE — 97164 PT RE-EVAL EST PLAN CARE: CPT

## 2024-05-23 PROCEDURE — 97530 THERAPEUTIC ACTIVITIES: CPT

## 2024-05-23 PROCEDURE — 97116 GAIT TRAINING THERAPY: CPT

## 2024-05-23 PROCEDURE — 63710000001 INSULIN LISPRO (HUMAN) PER 5 UNITS: Performed by: PHYSICIAN ASSISTANT

## 2024-05-23 PROCEDURE — 63710000001 INSULIN GLARGINE PER 5 UNITS: Performed by: INTERNAL MEDICINE

## 2024-05-23 PROCEDURE — 82948 REAGENT STRIP/BLOOD GLUCOSE: CPT

## 2024-05-23 PROCEDURE — 97168 OT RE-EVAL EST PLAN CARE: CPT

## 2024-05-23 RX ORDER — MONTELUKAST SODIUM 10 MG/1
10 TABLET ORAL NIGHTLY
Status: DISPENSED | OUTPATIENT
Start: 2024-05-23

## 2024-05-23 RX ADMIN — EMPAGLIFLOZIN 10 MG: 10 TABLET, FILM COATED ORAL at 08:21

## 2024-05-23 RX ADMIN — PREGABALIN 100 MG: 100 CAPSULE ORAL at 16:44

## 2024-05-23 RX ADMIN — FUROSEMIDE 60 MG: 40 TABLET ORAL at 08:20

## 2024-05-23 RX ADMIN — DICLOFENAC SODIUM 4 G: 10 GEL TOPICAL at 08:02

## 2024-05-23 RX ADMIN — SERTRALINE HYDROCHLORIDE 50 MG: 50 TABLET ORAL at 21:22

## 2024-05-23 RX ADMIN — Medication 1 APPLICATION: at 14:25

## 2024-05-23 RX ADMIN — DICLOFENAC SODIUM 4 G: 10 GEL TOPICAL at 17:20

## 2024-05-23 RX ADMIN — Medication 10 MG: at 21:22

## 2024-05-23 RX ADMIN — BACLOFEN 10 MG: 10 TABLET ORAL at 16:43

## 2024-05-23 RX ADMIN — FERROUS SULFATE TAB 325 MG (65 MG ELEMENTAL FE) 325 MG: 325 (65 FE) TAB at 08:20

## 2024-05-23 RX ADMIN — ACETAMINOPHEN 650 MG: 325 TABLET ORAL at 05:28

## 2024-05-23 RX ADMIN — IBUPROFEN 400 MG: 400 TABLET ORAL at 12:15

## 2024-05-23 RX ADMIN — DICLOFENAC SODIUM 4 G: 10 GEL TOPICAL at 21:24

## 2024-05-23 RX ADMIN — MONTELUKAST 10 MG: 10 TABLET, FILM COATED ORAL at 21:22

## 2024-05-23 RX ADMIN — INSULIN GLARGINE 35 UNITS: 100 INJECTION, SOLUTION SUBCUTANEOUS at 08:20

## 2024-05-23 RX ADMIN — INSULIN GLARGINE 35 UNITS: 100 INJECTION, SOLUTION SUBCUTANEOUS at 21:22

## 2024-05-23 RX ADMIN — OXYCODONE AND ACETAMINOPHEN 1 TABLET: 10; 325 TABLET ORAL at 16:43

## 2024-05-23 RX ADMIN — ATORVASTATIN CALCIUM 10 MG: 10 TABLET, FILM COATED ORAL at 21:22

## 2024-05-23 RX ADMIN — OXYCODONE AND ACETAMINOPHEN 1 TABLET: 10; 325 TABLET ORAL at 08:21

## 2024-05-23 RX ADMIN — INSULIN LISPRO 3 UNITS: 100 INJECTION, SOLUTION INTRAVENOUS; SUBCUTANEOUS at 21:22

## 2024-05-23 RX ADMIN — ACETAMINOPHEN 650 MG: 325 TABLET ORAL at 14:30

## 2024-05-23 RX ADMIN — DICLOFENAC SODIUM 4 G: 10 GEL TOPICAL at 11:52

## 2024-05-23 RX ADMIN — Medication 1 APPLICATION: at 21:24

## 2024-05-23 RX ADMIN — BACLOFEN 10 MG: 10 TABLET ORAL at 00:08

## 2024-05-23 RX ADMIN — CYANOCOBALAMIN TAB 500 MCG 1000 MCG: 500 TAB at 08:20

## 2024-05-23 RX ADMIN — CETIRIZINE HYDROCHLORIDE 10 MG: 10 TABLET, FILM COATED ORAL at 08:21

## 2024-05-23 RX ADMIN — Medication 500 MG: at 08:21

## 2024-05-23 RX ADMIN — ACETAMINOPHEN 650 MG: 325 TABLET ORAL at 21:35

## 2024-05-23 RX ADMIN — APIXABAN 5 MG: 5 TABLET, FILM COATED ORAL at 08:21

## 2024-05-23 RX ADMIN — LISINOPRIL 2.5 MG: 2.5 TABLET ORAL at 08:20

## 2024-05-23 RX ADMIN — Medication 500 MG: at 21:22

## 2024-05-23 RX ADMIN — APIXABAN 5 MG: 5 TABLET, FILM COATED ORAL at 21:22

## 2024-05-23 RX ADMIN — PREGABALIN 100 MG: 100 CAPSULE ORAL at 08:20

## 2024-05-23 RX ADMIN — BACLOFEN 10 MG: 10 TABLET ORAL at 08:20

## 2024-05-23 RX ADMIN — OXYCODONE AND ACETAMINOPHEN 1 TABLET: 10; 325 TABLET ORAL at 00:08

## 2024-05-23 RX ADMIN — PREGABALIN 100 MG: 100 CAPSULE ORAL at 21:22

## 2024-05-23 NOTE — THERAPY TREATMENT NOTE
SNF - Physical Therapy Treatment Note  KEO Dominguez     Patient Name: Jose Shaikh  : 1960  MRN: [id number]  Today's Date: 2024      Visit Dx:    ICD-10-CM ICD-9-CM   1. Difficulty in walking  R26.2 719.7   2. Type 2 diabetes mellitus with other specified complication, unspecified whether long term insulin use  E11.69 250.80     Patient Active Problem List   Diagnosis    Diabetic ulcer of left heel associated with type 2 DM    Acute osteomyelitis of left calcaneus     Diabetic ulcer of left heel associated with type 2 DM    Diabetic ulcer of right midfoot associated with type 2 DM    Paroxysmal atrial fibrillation    Essential hypertension    Hyperlipidemia LDL goal <100    Cellulitis and abscess of foot    High alkaline phosphatase level    Osteomyelitis    Onychomycosis    Onychocryptosis    Foot pain, bilateral    Osteomyelitis of foot, right, acute    Cellulitis of right foot    Type 2 diabetes mellitus, with long-term current use of insulin    Class 3 severe obesity due to excess calories with serious comorbidity and body mass index (BMI) of 45.0 to 49.9 in adult    Anxiety disorder, unspecified    Claustrophobia    Dependence on wheelchair    Depression, unspecified    Long term (current) use of anticoagulants    Long term (current) use of oral hypoglycemic drugs    Wound of foot    Ulcer of right foot    Orthostatic hypotension    Other chronic osteomyelitis, right ankle and foot    Personal history of nicotine dependence    Thrombocytopenia, unspecified    Unspecified open wound, right foot, initial encounter    Diabetic foot infection    Subacute osteomyelitis of right foot    Right foot pain    Sepsis    Onychomycosis    Foot pain, left    Impaired mobility and ADLs    Absence of toe of right foot    Corns and callosity    Disability of walking    Fracture    Limb swelling    Polyneuropathy    Pressure ulcer, stage 1    Shortness of breath    Generalized weakness    Debility    Ulcerated, foot,  left, limited to breakdown of skin    Onychomycosis     Past Medical History:   Diagnosis Date    Absence of toe of right foot     Acute osteomyelitis of left calcaneus  08/18/2021    Anxiety and depression     Arthritis     Cancer     Chronic pain     STATES HIS PAIN IS 10/10 AAT    Claustrophobia     Corns and callus     Diabetic ulcer of left heel associated with type 2 DM 08/18/2021    Diabetic ulcer of left heel associated with type 2 DM 07/06/2021    Diabetic ulcer of right midfoot associated with type 2 DM 08/18/2021    Difficulty walking     Essential hypertension 08/31/2021    Hammertoe     Hyperlipidemia LDL goal <100 08/31/2021    Ingrown toenail     Obesity     Paroxysmal atrial fibrillation 08/31/2021    Polyneuropathy     Pressure ulcer, stage 1     Tinea unguium     Type 2 diabetes mellitus with polyneuropathy      Past Surgical History:   Procedure Laterality Date    CYST REMOVAL      center of back; benign    EYE SURGERY      INCISION AND DRAINAGE ABSCESS      back    INCISION AND DRAINAGE LEG Right 12/10/2021    Procedure: INCISION AND DRAINAGE LOWER EXTREMITY;  Surgeon: Ash Leyva DPM;  Location: [facility];  Service: Podiatry;  Laterality: Right;    OTHER SURGICAL HISTORY      Surgical clips left foot    TOE SURGERY Right     Removal of 5th toe    TRANS METATARSAL AMPUTATION Right 12/02/2021    Procedure: AMPUTATION TRANS METATARSAL;  Surgeon: Ash Leyva DPM;  Location: [facility] OR;  Service: Podiatry;  Laterality: Right;    VASCULAR SURGERY      WRIST SURGERY Left     repair of injury       PT Assessment (Last 12 Hours)       PT Evaluation and Treatment       Row Name 05/23/24 1200          Physical Therapy Time and Intention    Subjective Information complains of;pain;dizziness  -VK     Document Type therapy note (daily note)  -VK     Mode of Treatment individual therapy;physical therapy  -VK     Patient Effort good  -VK       Row Name 05/23/24 1200           General Information    Patient Profile Reviewed yes  -VK     Existing Precautions/Restrictions fall  -VK       Row Name 05/23/24 1200          Pain    Pain Location - Side/Orientation Left  -VK     Pain Location - hip;knee  -VK     Pain Intervention(s) Nursing Notified;Repositioned;Rest;Ambulation/increased activity  -       Row Name 05/23/24 1200          Cognition    Affect/Mental Status (Cognition) WFL;anxious  -VK     Orientation Status (Cognition) oriented to;person;place;situation  -VK     Personal Safety Interventions nonskid shoes/slippers when out of bed;gait belt;fall prevention program maintained  -       Row Name 05/23/24 1200          Mobility    Extremity Weight-bearing Status left lower extremity;right lower extremity  -VK     Left Lower Extremity (Weight-bearing Status) weight-bearing as tolerated (WBAT)  -VK     Right Lower Extremity (Weight-bearing Status) weight-bearing as tolerated (WBAT)  -       Row Name 05/23/24 1200          Bed Mobility    Supine-Sit [place] (Bed Mobility) minimum assist (75% patient effort);moderate assist (50% patient effort);1 person assist;verbal cues  -VK     Sit-Supine [place] (Bed Mobility) minimum assist (75% patient effort);2 person assist;verbal cues  -VK     Bed Mobility, Safety Issues decreased use of legs for bridging/pushing  -     Assistive Device (Bed Mobility) bed rails;overhead trapeze  -       Row Name 05/23/24 1200          Transfers    Transfers sit-stand transfer;stand-sit transfer  -       Row Name 05/23/24 1200          Sit-Stand Transfer    Sit-Stand [place] (Transfers) moderate assist (50% patient effort);maximum assist (25% patient effort);2 person assist;verbal cues  -VK     Assistive Device (Sit-Stand Transfers) bariatric;walker, front-wheeled  -       Row Name 05/23/24 1200          Stand-Sit Transfer    Stand-Sit [place] (Transfers) moderate assist (50% patient effort);2 person assist;verbal cues  -VK      Assistive Device (Stand-Sit Transfers) bariatric;walker, front-wheeled  -VK       Row Name 05/23/24 1200          Gait/Stairs (Locomotion)    [place] Level (Gait) moderate assist (50% patient effort);maximum assist (25% patient effort);2 person assist;verbal cues  -VK     Assistive Device (Gait) bariatric equipment;walker, front-wheeled  -VK     Patient was able to Ambulate yes  -VK     Distance in Feet (Gait) 1  -VK     Pattern (Gait) 4-point;step-to  -VK     Deviations/Abnormal Patterns (Gait) gait speed decreased;stride length decreased  -VK     Bilateral Gait Deviations weight shift ability decreased;heel strike decreased;forward flexed posture  -       Row Name 05/23/24 1200          Safety Issues, Functional Mobility    Impairments Affecting Function (Mobility) balance;endurance/activity tolerance;pain;strength  -       Row Name 05/23/24 1200          Balance    Dynamic Standing Balance moderate assist;maximum assist;2-person assist;verbal cues  -VK     Position/Device Used, Standing Balance other (see comments)  Bariatric walker  -       Row Name 05/23/24 1200          Hip (Therapeutic Exercise)    Hip Isometrics (Therapeutic Exercise) bilateral;aDduction;gluteal sets;10 repetitions;2 sets  -       Row Name 05/23/24 1200          Knee (Therapeutic Exercise)    Knee AROM (Therapeutic Exercise) bilateral;LAQ (long arc quad);10 repetitions;2 sets;hamstring curls  -       Row Name 05/23/24 1200          Ankle (Therapeutic Exercise)    Ankle AROM (Therapeutic Exercise) bilateral;dorsiflexion;20 repititions  -       Row Name             Wound 03/27/24 1045 Right anterior Skin Tear    Wound - Properties Group Placement Date: 03/27/24  -CR Placement Time: 1045  -CR Side: Right  -CR Orientation: anterior  -CR Primary Wound Type: Skin tear  -CR    Retired Wound - Properties Group Placement Date: 03/27/24  -CR Placement Time: 1045  -CR Side: Right  -CR Orientation: anterior  -CR Primary Wound Type: Skin  tear  -CR    Retired Wound - Properties Group Date first assessed: 03/27/24  -CR Time first assessed: 1045  -CR Side: Right  -CR Primary Wound Type: Skin tear  -CR      Row Name             [REMOVED] Wound 04/03/24 1730 Right anterior hip MASD (Moisture associated skin damage)    Wound - Properties Group Placement Date: 04/03/24  -CA Placement Time: 1730  -CA Side: Right  -CA Orientation: anterior  -CA Location: hip  -CA Primary Wound Type: MASD  -CA Removal Date: 05/23/24  -FP Removal Time: 0830  -FP    Retired Wound - Properties Group Placement Date: 04/03/24  -CA Placement Time: 1730  -CA Side: Right  -CA Orientation: anterior  -CA Location: hip  -CA Primary Wound Type: MASD  -CA Removal Date: 05/23/24  -FP Removal Time: 0830  -FP    Retired Wound - Properties Group Date first assessed: 04/03/24  -CA Time first assessed: 1730  -CA Side: Right  -CA Location: hip  -CA Primary Wound Type: MASD  -CA Resolution Date: 05/23/24  -FP Resolution Time: 0830  -FP      Row Name             Wound 05/02/24 1033 Left posterior plantar    Wound - Properties Group Placement Date: 05/02/24  -KR Placement Time: 1033  -KR Side: Left  -KR Orientation: posterior  -KR Location: plantar  -KR    Retired Wound - Properties Group Placement Date: 05/02/24  -KR Placement Time: 1033  -KR Side: Left  -KR Orientation: posterior  -KR Location: plantar  -KR    Retired Wound - Properties Group Date first assessed: 05/02/24  -KR Time first assessed: 1033  -KR Side: Left  -KR Location: plantar  -KR      Row Name             Wound 05/09/24 2010 Right lateral abdomen Abrasion    Wound - Properties Group Placement Date: 05/09/24  -TM Placement Time: 2010  -TM Side: Right  -TM Orientation: lateral  -TM Location: abdomen  -TM Primary Wound Type: Abrasion  -TM    Retired Wound - Properties Group Placement Date: 05/09/24  -TM Placement Time: 2010  -TM Side: Right  -TM Orientation: lateral  -TM Location: abdomen  -TM Primary Wound Type: Abrasion  -TM     Retired Wound - Properties Group Date first assessed: 05/09/24  -TM Time first assessed: 2010  -TM Side: Right  -TM Location: abdomen  -TM Primary Wound Type: Abrasion  -TM      Row Name 05/23/24 1200          Positioning and Restraints    Pre-Treatment Position in bed  -VK     Post Treatment Position bed  -VK     In Bed fowlers;call light within reach;encouraged to call for assist;exit alarm on;with nsg  -VK       Row Name 05/23/24 1200          Progress Summary (PT)    Progress Toward Functional Goals (PT) progress toward functional goals is fair  -VK     Daily Progress Summary (PT) Pt agreeable to treatment. Worked on bed mob, transfers, and ambulation. Pt was unable to ambulate more than 1ftx1 today due to severe L hip pain and popping. Pt continues to benefit from skilled PT to address stated deficits.  -VK               User Key  (r) = Recorded By, (t) = Taken By, (c) = Cosigned By      Initials Name Provider Type    TM Rylee Orellana, RN Registered Nurse    Bettye Anand, RN Registered Nurse    Nicole Elise RN Registered Nurse    Adilson Carmona LPN Licensed Nurse    Anya Palumbo PTA Physical Therapist Assistant    Almaz Morales RN Registered Nurse                  Section G              Section GG                       Physical Therapy Education       Title: PT OT SLP Therapies (Done)       Topic: Physical Therapy (Done)       Point: Mobility training (Done)       Learning Progress Summary             Patient Acceptance, E,D, DU by PG at 4/24/2024 1039    Acceptance, E,TB, VU by RM at 2/14/2024 0518    Acceptance, E,TB, VU by RM at 1/9/2024 0420    Acceptance, E, VU by CR at 12/30/2023 1750    Acceptance, E, VU by CR at 12/20/2023 1345    Acceptance, E, VU by CR at 12/19/2023 1004    Acceptance, E,TB, VU by RM at 11/30/2023 0420    Acceptance, E,TB, VU by MOE at 11/8/2023 1341                         Point: Precautions (Done)       Learning Progress Summary             Patient  Acceptance, E,D, DU by PG at 4/24/2024 1039    Acceptance, E,TB, VU by RM at 2/14/2024 0518    Acceptance, E,TB, VU by RM at 1/9/2024 0420    Acceptance, E, VU by CR at 12/30/2023 1750    Acceptance, E,TB, VU by RM at 11/30/2023 0420    Acceptance, E,TB, VU by MOE at 11/8/2023 1341                                         User Key       Initials Effective Dates Name Provider Type Discipline     06/08/21 -  Bharath Berg, RN Registered Nurse Nurse    CR 06/13/22 -  Adilson Baker LPN Licensed Nurse Nurse    PG 06/16/21 -  Kodak Matos OT Occupational Therapist OT    MOE 06/03/21 -  Merlin Rao, PT Physical Therapist PT                  PT Recommendation and Plan     Progress Summary (PT)  Progress Toward Functional Goals (PT): progress toward functional goals is fair  Daily Progress Summary (PT): Pt agreeable to treatment. Worked on bed mob, transfers, and ambulation. Pt was unable to ambulate more than 1ftx1 today due to severe L hip pain and popping. Pt continues to benefit from skilled PT to address stated deficits.   Outcome Measures       Row Name 05/23/24 1200 05/22/24 1300 05/21/24 1200       How much help from another person do you currently need...    Turning from your back to your side while in flat bed without using bedrails? 3  -VK 3  -VK 3  -VK    Moving from lying on back to sitting on the side of a flat bed without bedrails? 3  -VK 3  -VK 3  -VK    Moving to and from a bed to a chair (including a wheelchair)? 2  -VK 2  -VK 2  -VK    Standing up from a chair using your arms (e.g., wheelchair, bedside chair)? 2  -VK 2  -VK 2  -VK    Climbing 3-5 steps with a railing? 1  -VK 1  -VK 1  -VK    To walk in hospital room? 2  -VK 2  -VK 2  -VK    AM-PAC 6 Clicks Score (PT) 13  -VK 13  -VK 13  -VK    Highest Level of Mobility Goal 4 --> Transfer to chair/commode  -VK 4 --> Transfer to chair/commode  -VK 4 --> Transfer to chair/commode  -VK      Row Name 05/20/24 1444             How much help from another  person do you currently need...    Turning from your back to your side while in flat bed without using bedrails? 3  -WM      Moving from lying on back to sitting on the side of a flat bed without bedrails? 3  -WM      Moving to and from a bed to a chair (including a wheelchair)? 2  -WM      Standing up from a chair using your arms (e.g., wheelchair, bedside chair)? 2  -WM      Climbing 3-5 steps with a railing? 1  -WM      To walk in hospital room? 1  -WM      AM-PAC 6 Clicks Score (PT) 12  -WM      Highest Level of Mobility Goal 4 --> Transfer to chair/commode  -WM                User Key  (r) = Recorded By, (t) = Taken By, (c) = Cosigned By      Initials Name Provider Type    Carson Johnson PTA Physical Therapist Assistant    Anya Palumbo PTA Physical Therapist Assistant                     Time Calculation:    PT Charges       Row Name 05/23/24 1208             Time Calculation    PT Received On 05/23/24  -VK         Timed Charges    74048 - PT Therapeutic Exercise Minutes 10  -VK      93700 - Gait Training Minutes  10  -VK      46355 - PT Therapeutic Activity Minutes 18  -VK         SNF Physical Therapy Minutes    Skilled Minutes- PT 38 min  -VK         Total Minutes    Timed Charges Total Minutes 38  -VK       Total Minutes 38  -VK                User Key  (r) = Recorded By, (t) = Taken By, (c) = Cosigned By      Initials Name Provider Type    Anya Palumbo PTA Physical Therapist Assistant                  Therapy Charges for Today       Code Description Service Date Service Provider Modifiers Qty    59795360325 HC PT THERAPEUTIC ACT EA 15 MIN 5/22/2024 Anya Sam PTA GP 1    84661561418 HC GAIT TRAINING EA 15 MIN 5/22/2024 Anya Sam PTA GP 1    14587218804 HC PT THER PROC EA 15 MIN 5/23/2024 Anya Sam PTA GP 1    59997241131 HC GAIT TRAINING EA 15 MIN 5/23/2024 Anya Sam PTA GP 1    71394554046 HC PT THERAPEUTIC ACT EA 15 MIN 5/23/2024 Anya Sam PTA GP 1             PT G-Codes  Outcome Measure Options: AM-PAC 6 Clicks Daily Activity (OT), Optimal Instrument  AM-PAC 6 Clicks Score (PT): 13  AM-PAC 6 Clicks Score (OT): 14    Anya Sam, PTA  5/23/2024

## 2024-05-23 NOTE — SIGNIFICANT NOTE
Wound Eval / Progress Noted    KEO Dominguez     Patient Name: Jose Shaikh  : 1960  MRN: [id number]  Today's Date: 2024                 Admit Date: 2023    Visit Dx:    ICD-10-CM ICD-9-CM   1. Difficulty in walking  R26.2 719.7   2. Type 2 diabetes mellitus with other specified complication, unspecified whether long term insulin use  E11.69 250.80         Debility    Ulcer of right foot    Ulcerated, foot, left, limited to breakdown of skin    Onychomycosis        Past Medical History:   Diagnosis Date    Absence of toe of right foot     Acute osteomyelitis of left calcaneus  2021    Anxiety and depression     Arthritis     Cancer     Chronic pain     STATES HIS PAIN IS 10/10 AAT    Claustrophobia     Corns and callus     Diabetic ulcer of left heel associated with type 2 DM 2021    Diabetic ulcer of left heel associated with type 2 DM 2021    Diabetic ulcer of right midfoot associated with type 2 DM 2021    Difficulty walking     Essential hypertension 2021    Hammertoe     Hyperlipidemia LDL goal <100 2021    Ingrown toenail     Obesity     Paroxysmal atrial fibrillation 2021    Polyneuropathy     Pressure ulcer, stage 1     Tinea unguium     Type 2 diabetes mellitus with polyneuropathy         Past Surgical History:   Procedure Laterality Date    CYST REMOVAL      center of back; benign    EYE SURGERY      INCISION AND DRAINAGE ABSCESS      back    INCISION AND DRAINAGE LEG Right 12/10/2021    Procedure: INCISION AND DRAINAGE LOWER EXTREMITY;  Surgeon: Ash Leyva DPM;  Location: [facility] MAIN OR;  Service: Podiatry;  Laterality: Right;    OTHER SURGICAL HISTORY      Surgical clips left foot    TOE SURGERY Right     Removal of 5th toe    TRANS METATARSAL AMPUTATION Right 2021    Procedure: AMPUTATION TRANS METATARSAL;  Surgeon: Ash Leyva DPM;  Location: [facility] MAIN OR;  Service: Podiatry;  Laterality: Right;    VASCULAR  SURGERY      WRIST SURGERY Left     repair of injury         Physical Assessment:     05/22/24 1339   Wound 03/27/24 1045 Right anterior Skin Tear   Placement Date/Time: 03/27/24 1045   Side: Right  Orientation: anterior  Primary Wound Type: Skin Tear   Wound Image    Dressing Appearance intact;no drainage   Closure None   Base pink;dry;red   Periwound intact;dry;pink   Periwound Temperature warm   Periwound Skin Turgor firm   Edges rolled/closed   Drainage Amount none   Care, Wound cleansed with;sterile normal saline   Dressing Care open to air   Periwound Care moisturizer applied   [REMOVED] Wound 04/03/24 1730 Right anterior hip MASD (Moisture associated skin damage)   Removal Date/Time: 05/23/24 0830  Placement Date/Time: 04/03/24 1730   Side: Right  Orientation: anterior  Location: hip  Primary Wound Type: MASD (Moisture associated skin damage)   Dressing Appearance open to air   Closure None   Base moist;red;pink   Periwound warm;moist   Periwound Temperature warm   Periwound Skin Turgor soft   Edges rolled/closed   Drainage Amount none   Dressing Care open to air   Wound 05/02/24 1033 Left posterior plantar   Placement Date/Time: 05/02/24 1033   Side: Left  Orientation: posterior  Location: plantar   Wound Image    Dressing Appearance intact;no drainage   Closure None   Base red;pink;scab;dry   Periwound intact;pink;dry   Periwound Temperature warm   Periwound Skin Turgor firm;soft   Edges rolled/closed   Drainage Amount none   Care, Wound cleansed with;sterile normal saline   Dressing Care open to air   Periwound Care moisturizer applied   Wound 05/09/24 2010 Right lateral abdomen Abrasion   Placement Date/Time: 05/09/24 2010   Side: Right  Orientation: lateral  Location: abdomen  Primary Wound Type: Abrasion   Wound Image    Dressing Appearance open to air   Closure None   Base pink;red;dry   Periwound intact;pink   Periwound Temperature warm   Periwound Skin Turgor soft   Edges rolled/closed   Drainage  Amount none   Care, Wound cleansed with;sterile normal saline   Dressing Care dressing applied;border dressing;non-adherent;petroleum-based;silicone   Periwound Care absorptive dressing applied   Rash 04/06/24 1659 Left calf plaque   Placement Date/Time: 04/06/24 1659   Side: Left  Location: calf  Type: plaque  Additional Comments: raised red and warm area to lt calf   Wound Image    Distribution localized   Color red   Configuration/Shape oval   Borders raised;distinct   Characteristics firm;raised   Rash 04/17/24 1443 Right lower leg macular   Placement Date/Time: 04/17/24 1443   Side: Right  Orientation: lower  Location: leg  Type: macular   Wound Image     Distribution generalized   Color red;pink   Configuration/Shape asymmetric   Borders diffuse   Characteristics flat;scaly        Wound Check / Follow-up: Patient seen today for wound follow-up/wound check.  Patient is currently lying in bed resting quietly with eyes closed.  He does arouse to name.  Explained purpose of visit and he is agreeable to assessment at this time.    Dressing to right distal foot removed.  There is no active drainage.  Site cleansed with normal saline and gauze.  Tissue to to right distal foot has reepithelialized there is scattered dryness with pink and red discoloration however no open wound is noted.  Would recommend to leave open to air with application of topical treatments, however with application of splint to foot would likely recommend preventative dressing.    Left foot plantar aspect with deep grooves from prior surgical interventions.  The site was previously noted to be open.  Tissue there is dry with some red crusting.  There is no open wound at present time.  Cleansed area with normal saline and gauze periwound is also dry and flaky.  Would recommend skin care protection and skin hygiene.  Again may potentially benefit from preventative dressing during ambulation or therapy exercises.    Patient with chronic  discoloration to bilateral lower extremities.  The right lower extremity has excessive dryness with some scale like appearances.  Posterior left lower leg with raised firm reddened area.  This is overall improved from prior assessments.  Would recommend to continue skin care/hygiene.    Right lateral abdomen with abrasion from bedside commode per patient report.  While tissue was not open or oozing at present time there is some pink to red discoloration.  Patient does note that it itches intermittently and he does rub it.  Would recommend a dressing with nonadherent petroleum based gauze and silicone border dressing at present time to prevent further trauma as well as to allow for rehydration of this tissue.    Moisture and redness continues to remain present within skin folds and to bilateral groin.  There is no erosion at present time.  Ongoing skin care/hygiene recommended.    Patient declines assessment of posterior aspect at present time stating he does not feel like rolling he denies any wounds or open areas to his knowledge.  Not notified by primary RN that there are any new concerns to his posterior aspect.    Impression: Resolving traumatic injuries to right distal foot as well as to left plantar foot.  Dry flaky skin.  Chronic discoloration to bilateral lower extremities.  Moisture and redness within skin folds.  Abrasion to right lateral abdomen.    Short term goals: Regain skin integrity.  Topical treatments.  Preventative dressings as needed.  Moisture prevention and pressure reduction.    Bettye Crews RN    5/23/2024    08:34 EDT

## 2024-05-23 NOTE — PLAN OF CARE
Goal Outcome Evaluation:  Plan of Care Reviewed With: patient        Progress: improving  Outcome Evaluation: Resident alert, oriented, able to make needs known to staff. Transfers with assist of 2-3 to chair. Participated in therapy as ordered. tolerated well. Medicated for prn pain x2, effective. Blood glucose stable for all meals. wound nurse here for f/u on open areas. Resident pleasant and cooperative

## 2024-05-23 NOTE — THERAPY RE-EVALUATION
Patient Name: Jose Shaikh  : 1960    MRN: [id number]                              Today's Date: 2024       Admit Date: 2023    Visit Dx:     ICD-10-CM ICD-9-CM   1. Difficulty in walking  R26.2 719.7   2. Type 2 diabetes mellitus with other specified complication, unspecified whether long term insulin use  E11.69 250.80     Patient Active Problem List   Diagnosis    Diabetic ulcer of left heel associated with type 2 DM    Acute osteomyelitis of left calcaneus     Diabetic ulcer of left heel associated with type 2 DM    Diabetic ulcer of right midfoot associated with type 2 DM    Paroxysmal atrial fibrillation    Essential hypertension    Hyperlipidemia LDL goal <100    Cellulitis and abscess of foot    High alkaline phosphatase level    Osteomyelitis    Onychomycosis    Onychocryptosis    Foot pain, bilateral    Osteomyelitis of foot, right, acute    Cellulitis of right foot    Type 2 diabetes mellitus, with long-term current use of insulin    Class 3 severe obesity due to excess calories with serious comorbidity and body mass index (BMI) of 45.0 to 49.9 in adult    Anxiety disorder, unspecified    Claustrophobia    Dependence on wheelchair    Depression, unspecified    Long term (current) use of anticoagulants    Long term (current) use of oral hypoglycemic drugs    Wound of foot    Ulcer of right foot    Orthostatic hypotension    Other chronic osteomyelitis, right ankle and foot    Personal history of nicotine dependence    Thrombocytopenia, unspecified    Unspecified open wound, right foot, initial encounter    Diabetic foot infection    Subacute osteomyelitis of right foot    Right foot pain    Sepsis    Onychomycosis    Foot pain, left    Impaired mobility and ADLs    Absence of toe of right foot    Corns and callosity    Disability of walking    Fracture    Limb swelling    Polyneuropathy    Pressure ulcer, stage 1    Shortness of breath    Generalized weakness    Debility    Ulcerated,  foot, left, limited to breakdown of skin    Onychomycosis     Past Medical History:   Diagnosis Date    Absence of toe of right foot     Acute osteomyelitis of left calcaneus  08/18/2021    Anxiety and depression     Arthritis     Cancer     Chronic pain     STATES HIS PAIN IS 10/10 AAT    Claustrophobia     Corns and callus     Diabetic ulcer of left heel associated with type 2 DM 08/18/2021    Diabetic ulcer of left heel associated with type 2 DM 07/06/2021    Diabetic ulcer of right midfoot associated with type 2 DM 08/18/2021    Difficulty walking     Essential hypertension 08/31/2021    Hammertoe     Hyperlipidemia LDL goal <100 08/31/2021    Ingrown toenail     Obesity     Paroxysmal atrial fibrillation 08/31/2021    Polyneuropathy     Pressure ulcer, stage 1     Tinea unguium     Type 2 diabetes mellitus with polyneuropathy      Past Surgical History:   Procedure Laterality Date    CYST REMOVAL      center of back; benign    EYE SURGERY      INCISION AND DRAINAGE ABSCESS      back    INCISION AND DRAINAGE LEG Right 12/10/2021    Procedure: INCISION AND DRAINAGE LOWER EXTREMITY;  Surgeon: Ash Leyva DPM;  Location: [facility];  Service: Podiatry;  Laterality: Right;    OTHER SURGICAL HISTORY      Surgical clips left foot    TOE SURGERY Right     Removal of 5th toe    TRANS METATARSAL AMPUTATION Right 12/02/2021    Procedure: AMPUTATION TRANS METATARSAL;  Surgeon: sAh Leyva DPM;  Location: [facility] OR;  Service: Podiatry;  Laterality: Right;    VASCULAR SURGERY      WRIST SURGERY Left     repair of injury      General Information       Row Name 05/23/24 1404 05/23/24 0006       OT Time and Intention    Document Type therapy note (daily note)  -LF re-evaluation  -LF    Mode of Treatment individual therapy;occupational therapy  -LF individual therapy;occupational therapy  -LF      Row Name 05/23/24 9463          General Information    Patient Profile Reviewed yes  -LF       Row  Name 05/23/24 1359          Safety Issues, Functional Mobility    Impairments Affecting Function (Mobility) balance;endurance/activity tolerance;pain;strength  -LF               User Key  (r) = Recorded By, (t) = Taken By, (c) = Cosigned By      Initials Name Provider Type    Michelle Chaney OT Occupational Therapist                     Mobility/ADL's       Row Name 05/23/24 1404 05/23/24 1359       Bed Mobility    Bed Mobility supine-sit;sit-supine  -LF supine-sit;sit-supine  -LF    Supine-Sit Garber (Bed Mobility) minimum assist (75% patient effort);moderate assist (50% patient effort);1 person assist;verbal cues  -LF minimum assist (75% patient effort);moderate assist (50% patient effort);1 person assist;verbal cues  -LF    Sit-Supine Garber (Bed Mobility) minimum assist (75% patient effort);2 person assist;verbal cues  -LF minimum assist (75% patient effort);2 person assist;verbal cues  -LF    Bed Mobility, Safety Issues decreased use of legs for bridging/pushing  -LF decreased use of legs for bridging/pushing  -LF    Assistive Device (Bed Mobility) bed rails;head of bed elevated  -LF bed rails;head of bed elevated  -LF    Comment, (Bed Mobility) Patient able to pull self to HOB in Trendelenburg. He declined sit to stand d/t completing with PT prior to OT's arrival, per PT note he completed sit to stand with mod/max assist x2 using RW.  -LF Patient able to pull self to HOB in Trendelenburg. He declined sit to stand d/t completing with PT prior to OT's arrival, per PT note he completed sit to stand with mod/max assist x2 using RW.  -      Row Name 05/23/24 1404          Transfers    Transfers sit-stand transfer;stand-sit transfer  -       Row Name 05/23/24 1359          Activities of Daily Living    BADL Assessment/Intervention bathing;upper body dressing;lower body dressing;grooming;feeding;toileting  -       Row Name 05/23/24 1404 05/23/24 1359       Mobility    Extremity Weight-bearing  Status left lower extremity;right lower extremity  -LF left lower extremity;right lower extremity  -LF    Left Lower Extremity (Weight-bearing Status) weight-bearing as tolerated (WBAT)  - weight-bearing as tolerated (WBAT)  -LF    Right Lower Extremity (Weight-bearing Status) weight-bearing as tolerated (WBAT)  -LF weight-bearing as tolerated (WBAT)  -[facility] Name 05/23/24 1359          Bathing Assessment/Intervention    [place]om[place] Level (Bathing) bathing skills;upper body;set up;lower body;dependent (less than 25% patient effort)  -[facility] Name 05/23/24 1359          Upper Body Dressing Assessment/Training    [place]ombs Level (Upper Body Dressing) upper body dressing skills;set up  -LF       Row Name 05/23/24 1359          Lower Body Dressing Assessment/Training    [place] Level (Lower Body Dressing) lower body dressing skills;dependent (less than 25% patient effort)  -LF       Row Name 05/23/24 1359          Grooming Assessment/Training    [place] Level (Grooming) grooming skills;set up  -LF       Row Name 05/23/24 1359          Toileting Assessment/Training    [place]ombs Level (Toileting) toileting skills;dependent (less than 25% patient effort)  -[facility] Name 05/23/24 1359          Self-Feeding Assessment/Training    [place] Level (Feeding) feeding skills;set up  -               User Key  (r) = Recorded By, (t) = Taken By, (c) = Cosigned By      Initials Name Provider Type     Michelle Howard OT Occupational Therapist                   Obj/Interventions       Row Name 05/23/24 1401          Sensory Assessment (Somatosensory)    Sensory Assessment (Somatosensory) UE sensation intact  -LF       Row Name 05/23/24 1401          Vision Assessment/Intervention    Visual Impairment/Limitations WFL  -[facility] Name 05/23/24 1401          Range of Motion Comprehensive    General Range of Motion bilateral upper extremity ROM WFL  -[facility] Name 05/23/24 1401           Strength Comprehensive (MMT)    Comment, General Manual Muscle Testing (MMT) Assessment 5/5 BUEs  -LF       Row Name 05/23/24 1407 05/23/24 1401       Motor Skills    Motor Skills functional endurance  -LF coordination;functional endurance  -LF    Coordination -- bilateral;upper extremity;WFL  -LF    Functional Endurance Fair+  -LF Fair+  -LF    Therapeutic Exercise aerobic  -LF --      Row Name 05/23/24 1407 05/23/24 1401       Balance    Balance Assessment sitting dynamic balance  -LF sitting dynamic balance  -LF    Dynamic Sitting Balance supervision  -LF supervision  -LF    Position, Sitting Balance unsupported;sitting edge of bed  -LF unsupported;sitting edge of bed  -LF    Balance Interventions sitting;dynamic;UE activity with balance activity;occupation based/functional task  -LF --      Row Name 05/23/24 1407          Aerobic Exercise    Type (Aerobic Exercise) other (see comments)  Omnicycle  -LF     Comment, Aerobic Exercise (Therapeutic Exercise) x10 omnicycle  -LF               User Key  (r) = Recorded By, (t) = Taken By, (c) = Cosigned By      Initials Name Provider Type    LF Michelle Howard OT Occupational Therapist                   Goals/Plan       Row Name 05/23/24 1403          Bed Mobility Goal 1 (OT)    Activity/Assistive Device (Bed Mobility Goal 1, OT) bed mobility activities, all  -LF     Greer Level/Cues Needed (Bed Mobility Goal 1, OT) standby assist  -LF     Time Frame (Bed Mobility Goal 1, OT) long term goal (LTG);10 days  -LF     Progress/Outcomes (Bed Mobility Goal 1, OT) new goal  -LF       Row Name 05/23/24 1403          Transfer Goal 1 (OT)    Activity/Assistive Device (Transfer Goal 1, OT) transfers, all  -LF     Greer Level/Cues Needed (Transfer Goal 1, OT) minimum assist (75% or more patient effort)  -LF     Time Frame (Transfer Goal 1, OT) long term goal (LTG);10 days  -LF     Progress/Outcome (Transfer Goal 1, OT) continuing progress toward goal  -LF       Row  Name 05/23/24 1403          Bathing Goal 1 (OT)    Activity/Device (Bathing Goal 1, OT) bathing skills, all  -LF     Rankin Level/Cues Needed (Bathing Goal 1, OT) supervision required  -LF     Time Frame (Bathing Goal 1, OT) long term goal (LTG);10 days  -LF     Progress/Outcomes (Bathing Goal 1, OT) continuing progress toward goal  -LF       Row Name 05/23/24 1403          Dressing Goal 1 (OT)    Activity/Device (Dressing Goal 1, OT) dressing skills, all  -LF     Rankin/Cues Needed (Dressing Goal 1, OT) minimum assist (75% or more patient effort)  -LF     Time Frame (Dressing Goal 1, OT) long term goal (LTG);10 days  -LF     Progress/Outcome (Dressing Goal 1, OT) continuing progress toward goal  -LF       Row Name 05/23/24 1403          Toileting Goal 1 (OT)    Activity/Device (Toileting Goal 1, OT) toileting skills, all  -LF     Rankin Level/Cues Needed (Toileting Goal 1, OT) minimum assist (75% or more patient effort)  -LF     Time Frame (Toileting Goal 1, OT) long term goal (LTG);10 days  -LF     Progress/Outcome (Toileting Goal 1, OT) continuing progress toward goal  -LF       Row Name 05/23/24 1403          Problem Specific Goal 1 (OT)    Problem Specific Goal 1 (OT) Patient will improve activity tolerance to good to support independence with self-care activity and functional transfers  -LF     Time Frame (Problem Specific Goal 1, OT) long term goal (LTG);10 days  -LF     Progress/Outcome (Problem Specific Goal 1, OT) continuing progress toward goal  -LF       Row Name 05/23/24 1403          Therapy Assessment/Plan (OT)    Planned Therapy Interventions (OT) activity tolerance training;BADL retraining;functional balance retraining;occupation/activity based interventions;patient/caregiver education/training;strengthening exercise;transfer/mobility retraining  -LF               User Key  (r) = Recorded By, (t) = Taken By, (c) = Cosigned By      Initials Name Provider Type    LF Michelle Howard  OT Occupational Therapist                   Clinical Impression       Row Name 05/23/24 1401          Pain Assessment    Pain Intervention(s) Nursing Notified  -     Additional Documentation Pain Scale: FACES Pre/Post-Treatment (Group)  -       Row Name 05/23/24 1401          Pain Scale: FACES Pre/Post-Treatment    Pain: FACES Scale, Pretreatment 2-->hurts little bit  -LF     Posttreatment Pain Rating 6-->hurts even more  -     Pain Location - Side/Orientation Left  -     Pain Location - hip  -       Row Name 05/23/24 1401          Plan of Care Review    Plan of Care Reviewed With patient  -     Progress improving  -     Outcome Evaluation Patient continues to present with limitations in self-care, functional transfers, balance, and endurance. He would benefit from continued skilled occupational therapy services to maximize independence with ADLs/functional transfers. Bed mobility goal has been added, continue with all other established plan of care and goals.  -       Row Name 05/23/24 1401          Therapy Assessment/Plan (OT)    Patient/Family Therapy Goal Statement (OT) To maximize independence.  -     Rehab Potential (OT) fair, will monitor progress closely  -     Criteria for Skilled Therapeutic Interventions Met (OT) yes;meets criteria;skilled treatment is necessary  -     Therapy Frequency (OT) 5 times/wk  -       Row Name 05/23/24 1401          Therapy Plan Review/Discharge Plan (OT)    Anticipated Discharge Disposition (OT) [facility] care facility  -       Row Name 05/23/24 1401          Vital Signs    O2 Delivery Pre Treatment room air  -     O2 Delivery Intra Treatment room air  -     O2 Delivery Post Treatment room air  -       Row Name 05/23/24 1401          Positioning and Restraints    Pre-Treatment Position in bed  -     Post Treatment Position bed  -LF     In Bed fowlers;call light within reach;encouraged to call for assist  -               User Key  (r) =  Recorded By, (t) = Taken By, (c) = Cosigned By      Initials Name Provider Type    Michelle Chaney OT Occupational Therapist                   Outcome Measures       Row Name 05/23/24 1200          How much help from another person do you currently need...    Turning from your back to your side while in flat bed without using bedrails? 3  -VK     Moving from lying on back to sitting on the side of a flat bed without bedrails? 3  -VK     Moving to and from a bed to a chair (including a wheelchair)? 2  -VK     Standing up from a chair using your arms (e.g., wheelchair, bedside chair)? 2  -VK     Climbing 3-5 steps with a railing? 1  -VK     To walk in hospital room? 2  -VK     AM-PAC 6 Clicks Score (PT) 13  -VK     Highest Level of Mobility Goal 4 --> Transfer to chair/commode  -VK       Row Name 05/23/24 1404          Optimal Instrument    Optimal Instrument Optimal - 3  -LF     From the list, choose the 3 activities you would most like to be able to do without any difficulty Bending/stooping;Standing;Reaching  -LF     Total Score Optimal - 3 0  -LF               User Key  (r) = Recorded By, (t) = Taken By, (c) = Cosigned By      Initials Name Provider Type    Michelle Chaney OT Occupational Therapist    Anya Palumbo PTA Physical Therapist Assistant                    Occupational Therapy Education       Title: PT OT SLP Therapies (Done)       Topic: Occupational Therapy (Done)       Point: ADL training (Done)       Description:   Instruct learner(s) on proper safety adaptation and remediation techniques during self care or transfers.   Instruct in proper use of assistive devices.                  Learning Progress Summary             Patient Acceptance, E,D, DU by PG at 4/24/2024 1039    Acceptance, E,TB, VU by RM at 2/14/2024 0518    Acceptance, E,TB, VU by RM at 1/9/2024 0420    Acceptance, E, VU by CR at 12/30/2023 1750    Acceptance, E,TB, VU by RM at 11/30/2023 0420    Acceptance, E,D, DU by PG  at 11/7/2023 0932                         Point: Home exercise program (Done)       Description:   Instruct learner(s) on appropriate technique for monitoring, assisting and/or progressing therapeutic exercises/activities.                  Learning Progress Summary             Patient Acceptance, E,D, DU by PG at 4/24/2024 1039    Acceptance, E,TB, VU by RM at 2/14/2024 0518    Acceptance, E,TB, VU by RM at 1/9/2024 0420    Acceptance, E, VU by CR at 12/30/2023 1750    Acceptance, E,TB, VU by RM at 11/30/2023 0420    Acceptance, E,D, DU by PG at 11/7/2023 0932                         Point: Precautions (Done)       Description:   Instruct learner(s) on prescribed precautions during self-care and functional transfers.                  Learning Progress Summary             Patient Acceptance, E,D, DU by PG at 4/24/2024 1039    Acceptance, E,TB, VU by RM at 2/14/2024 0518    Acceptance, E,TB, VU by RM at 1/9/2024 0420    Acceptance, E, VU by CR at 12/30/2023 1750    Acceptance, E,TB, VU by RM at 11/30/2023 0420    Acceptance, E,D, DU by PG at 11/7/2023 0932                         Point: Body mechanics (Done)       Description:   Instruct learner(s) on proper positioning and spine alignment during self-care, functional mobility activities and/or exercises.                  Learning Progress Summary             Patient Acceptance, E,D, DU by PG at 4/24/2024 1039    Acceptance, E,TB, VU by RM at 2/14/2024 0518    Acceptance, E,TB, VU by RM at 1/9/2024 0420    Acceptance, E, VU by CR at 12/30/2023 1750    Acceptance, E,TB, VU by RM at 11/30/2023 0420    Acceptance, E,D, DU by PG at 11/7/2023 0932                                         User Key       Initials Effective Dates Name Provider Type Discipline     06/08/21 -  Bharath Berg, RN Registered Nurse Nurse    CR 06/13/22 -  Adilson Baker LPN Licensed Nurse Nurse    PG 06/16/21 -  Kodak Matos OT Occupational Therapist OT                  OT Recommendation and  Plan  Planned Therapy Interventions (OT): activity tolerance training, BADL retraining, functional balance retraining, occupation/activity based interventions, patient/caregiver education/training, strengthening exercise, transfer/mobility retraining  Therapy Frequency (OT): 5 times/wk  Plan of Care Review  Plan of Care Reviewed With: patient  Progress: improving  Outcome Evaluation: Patient continues to present with limitations in self-care, functional transfers, balance, and endurance. He would benefit from continued skilled occupational therapy services to maximize independence with ADLs/functional transfers. Bed mobility goal has been added, continue with all other established plan of care and goals.     Time Calculation:         Time Calculation- OT       Row Name 05/23/24 1404 05/23/24 1208          Time Calculation- OT    OT Received On 05/23/24  -LF --     OT Goal Re-Cert Due Date 06/01/24  -LF --        Timed Charges    30842 - OT Therapeutic Exercise Minutes 10  -LF --     62180 - Gait Training Minutes  -- 10  -VK     16734 - OT Therapeutic Activity Minutes 8  -LF --        Untimed Charges    OT Eval/Re-eval Minutes 20  -LF --        Total Minutes    Timed Charges Total Minutes 18  -LF 10  -VK     Untimed Charges Total Minutes 20  -LF --      Total Minutes 38  -LF 10  -VK               User Key  (r) = Recorded By, (t) = Taken By, (c) = Cosigned By      Initials Name Provider Type    LF Michelle Howard OT Occupational Therapist    Anya Palumbo PTA Physical Therapist Assistant                  Therapy Charges for Today       Code Description Service Date Service Provider Modifiers Qty    [id number]  OT THER PROC EA 15 MIN 5/23/2024 Michelle Howard OT GO 1    [id number]  OT RE-EVAL 2 5/23/2024 Michelle Howard OT GO 1                 Michelle Howard OT  5/23/2024

## 2024-05-23 NOTE — THERAPY RE-EVALUATION
SNF - Physical Therapy Re-Evaluation   Angelica     Patient Name: Jose Shaikh  : 1960  MRN: [id number]  Today's Date: 2024      I participated and directed the entire treatment session today as documented by Anya Sam PTA.  I have reviewed and agree with her objective findings.  The note below is a combination of our therapy session.        Visit Dx:     ICD-10-CM ICD-9-CM   1. Difficulty in walking  R26.2 719.7   2. Type 2 diabetes mellitus with other specified complication, unspecified whether long term insulin use  E11.69 250.80     Patient Active Problem List   Diagnosis    Diabetic ulcer of left heel associated with type 2 DM    Acute osteomyelitis of left calcaneus     Diabetic ulcer of left heel associated with type 2 DM    Diabetic ulcer of right midfoot associated with type 2 DM    Paroxysmal atrial fibrillation    Essential hypertension    Hyperlipidemia LDL goal <100    Cellulitis and abscess of foot    High alkaline phosphatase level    Osteomyelitis    Onychomycosis    Onychocryptosis    Foot pain, bilateral    Osteomyelitis of foot, right, acute    Cellulitis of right foot    Type 2 diabetes mellitus, with long-term current use of insulin    Class 3 severe obesity due to excess calories with serious comorbidity and body mass index (BMI) of 45.0 to 49.9 in adult    Anxiety disorder, unspecified    Claustrophobia    Dependence on wheelchair    Depression, unspecified    Long term (current) use of anticoagulants    Long term (current) use of oral hypoglycemic drugs    Wound of foot    Ulcer of right foot    Orthostatic hypotension    Other chronic osteomyelitis, right ankle and foot    Personal history of nicotine dependence    Thrombocytopenia, unspecified    Unspecified open wound, right foot, initial encounter    Diabetic foot infection    Subacute osteomyelitis of right foot    Right foot pain    Sepsis    Onychomycosis    Foot pain, left    Impaired mobility and ADLs    Absence of  toe of right foot    Corns and callosity    Disability of walking    Fracture    Limb swelling    Polyneuropathy    Pressure ulcer, stage 1    Shortness of breath    Generalized weakness    Debility    Ulcerated, foot, left, limited to breakdown of skin    Onychomycosis     Past Medical History:   Diagnosis Date    Absence of toe of right foot     Acute osteomyelitis of left calcaneus  08/18/2021    Anxiety and depression     Arthritis     Cancer     Chronic pain     STATES HIS PAIN IS 10/10 AAT    Claustrophobia     Corns and callus     Diabetic ulcer of left heel associated with type 2 DM 08/18/2021    Diabetic ulcer of left heel associated with type 2 DM 07/06/2021    Diabetic ulcer of right midfoot associated with type 2 DM 08/18/2021    Difficulty walking     Essential hypertension 08/31/2021    Hammertoe     Hyperlipidemia LDL goal <100 08/31/2021    Ingrown toenail     Obesity     Paroxysmal atrial fibrillation 08/31/2021    Polyneuropathy     Pressure ulcer, stage 1     Tinea unguium     Type 2 diabetes mellitus with polyneuropathy      Past Surgical History:   Procedure Laterality Date    CYST REMOVAL      center of back; benign    EYE SURGERY      INCISION AND DRAINAGE ABSCESS      back    INCISION AND DRAINAGE LEG Right 12/10/2021    Procedure: INCISION AND DRAINAGE LOWER EXTREMITY;  Surgeon: Ash Leyva DPM;  Location: [facility];  Service: Podiatry;  Laterality: Right;    OTHER SURGICAL HISTORY      Surgical clips left foot    TOE SURGERY Right     Removal of 5th toe    TRANS METATARSAL AMPUTATION Right 12/02/2021    Procedure: AMPUTATION TRANS METATARSAL;  Surgeon: Ash Leyva DPM;  Location: [facility] OR;  Service: Podiatry;  Laterality: Right;    VASCULAR SURGERY      WRIST SURGERY Left     repair of injury     PT Assessment (Last 12 Hours)       PT Evaluation and Treatment       Row Name 05/23/24 1216 05/23/24 1200       Physical Therapy Time and Intention    Subjective  Information -- complains of;pain;dizziness  -VK    Document Type re-evaluation  -MOE therapy note (daily note)  -VK    Mode of Treatment -- individual therapy;physical therapy  -VK    Patient Effort good  -MOE good  -VK      Row Name 05/23/24 1200          General Information    Patient Profile Reviewed yes  -VK     Existing Precautions/Restrictions fall  -VK       Row Name 05/23/24 1200          Pain    Pain Location - Side/Orientation Left  -VK     Pain Location - hip;knee  -VK     Pain Intervention(s) Nursing Notified;Repositioned;Rest;Ambulation/increased activity  -VK       Row Name 05/23/24 1200          Cognition    Affect/Mental Status (Cognition) WFL;anxious  -VK     Orientation Status (Cognition) oriented to;person;place;situation  -VK     Personal Safety Interventions nonskid shoes/slippers when out of bed;gait belt;fall prevention program maintained  -VK       Row Name 05/23/24 1200          Mobility    Extremity Weight-bearing Status left lower extremity;right lower extremity  -VK     Left Lower Extremity (Weight-bearing Status) weight-bearing as tolerated (WBAT)  -VK     Right Lower Extremity (Weight-bearing Status) weight-bearing as tolerated (WBAT)  -VK       Row Name 05/23/24 1200          Bed Mobility    Supine-Sit [place] (Bed Mobility) minimum assist (75% patient effort);moderate assist (50% patient effort);1 person assist;verbal cues  -VK     Sit-Supine [place] (Bed Mobility) minimum assist (75% patient effort);2 person assist;verbal cues  -VK     Bed Mobility, Safety Issues decreased use of legs for bridging/pushing  -VK     Assistive Device (Bed Mobility) bed rails;overhead trapeze  -VK       Row Name 05/23/24 1200          Transfers    Transfers sit-stand transfer;stand-sit transfer  -VK       Row Name 05/23/24 1200          Sit-Stand Transfer    Sit-Stand [place] (Transfers) moderate assist (50% patient effort);maximum assist (25% patient effort);2 person assist;verbal cues  -VK      Assistive Device (Sit-Stand Transfers) bariatric;walker, front-wheeled  -VK       Row Name 05/23/24 1200          Stand-Sit Transfer    Stand-Sit [place] (Transfers) moderate assist (50% patient effort);2 person assist;verbal cues  -VK     Assistive Device (Stand-Sit Transfers) bariatric;walker, front-wheeled  -VK       Row Name 05/23/24 1200          Gait/Stairs (Locomotion)    [place] Level (Gait) moderate assist (50% patient effort);maximum assist (25% patient effort);2 person assist;verbal cues  -VK     Assistive Device (Gait) bariatric equipment;walker, front-wheeled  -VK     Patient was able to Ambulate yes  -VK     Distance in Feet (Gait) 1  -VK     Pattern (Gait) 4-point;step-to  -VK     Deviations/Abnormal Patterns (Gait) gait speed decreased;stride length decreased  -VK     Bilateral Gait Deviations weight shift ability decreased;heel strike decreased;forward flexed posture  -       Row Name 05/23/24 1200          Safety Issues, Functional Mobility    Impairments Affecting Function (Mobility) balance;endurance/activity tolerance;pain;strength  -       Row Name 05/23/24 1200          Balance    Dynamic Standing Balance moderate assist;maximum assist;2-person assist;verbal cues  -VK     Position/Device Used, Standing Balance other (see comments)  Bariatric walker  -       Row Name 05/23/24 1200          Hip (Therapeutic Exercise)    Hip Isometrics (Therapeutic Exercise) bilateral;aDduction;gluteal sets;10 repetitions;2 sets  -       Row Name 05/23/24 1200          Knee (Therapeutic Exercise)    Knee AROM (Therapeutic Exercise) bilateral;LAQ (long arc quad);10 repetitions;2 sets;hamstring curls  -       Row Name 05/23/24 1200          Ankle (Therapeutic Exercise)    Ankle AROM (Therapeutic Exercise) bilateral;dorsiflexion;20 repititions  -       Row Name             Wound 03/27/24 1045 Right anterior Skin Tear    Wound - Properties Group Placement Date: 03/27/24  -CR Placement Time:  1045  -CR Side: Right  -CR Orientation: anterior  -CR Primary Wound Type: Skin tear  -CR    Retired Wound - Properties Group Placement Date: 03/27/24  -CR Placement Time: 1045  -CR Side: Right  -CR Orientation: anterior  -CR Primary Wound Type: Skin tear  -CR    Retired Wound - Properties Group Date first assessed: 03/27/24  -CR Time first assessed: 1045  -CR Side: Right  -CR Primary Wound Type: Skin tear  -CR      Row Name             [REMOVED] Wound 04/03/24 1730 Right anterior hip MASD (Moisture associated skin damage)    Wound - Properties Group Placement Date: 04/03/24  -OR Placement Time: 1730  -OR Side: Right  -OR Orientation: anterior  -OR Location: hip  -OR Primary Wound Type: MASD  -OR Removal Date: 05/23/24  -FP Removal Time: 0830  -FP    Retired Wound - Properties Group Placement Date: 04/03/24  -OR Placement Time: 1730  -OR Side: Right  -OR Orientation: anterior  -OR Location: hip  -OR Primary Wound Type: MASD  -OR Removal Date: 05/23/24  -FP Removal Time: 0830  -FP    Retired Wound - Properties Group Date first assessed: 04/03/24  -OR Time first assessed: 1730  -OR Side: Right  -OR Location: hip  -OR Primary Wound Type: MASD  -OR Resolution Date: 05/23/24  -FP Resolution Time: 0830  -FP      Row Name             Wound 05/02/24 1033 Left posterior plantar    Wound - Properties Group Placement Date: 05/02/24  -KR Placement Time: 1033  -KR Side: Left  -KR Orientation: posterior  -KR Location: plantar  -KR    Retired Wound - Properties Group Placement Date: 05/02/24  -KR Placement Time: 1033  -KR Side: Left  -KR Orientation: posterior  -KR Location: plantar  -KR    Retired Wound - Properties Group Date first assessed: 05/02/24  -KR Time first assessed: 1033  -KR Side: Left  -KR Location: plantar  -KR      Row Name             Wound 05/09/24 2010 Right lateral abdomen Abrasion    Wound - Properties Group Placement Date: 05/09/24  -TM Placement Time: 2010  -TM Side: Right  -TM Orientation: lateral  -TM  Location: abdomen  -TM Primary Wound Type: Abrasion  -TM    Retired Wound - Properties Group Placement Date: 05/09/24  -TM Placement Time: 2010 -TM Side: Right  -TM Orientation: lateral  -TM Location: abdomen  -TM Primary Wound Type: Abrasion  -TM    Retired Wound - Properties Group Date first assessed: 05/09/24  -TM Time first assessed: 2010 -TM Side: Right  -TM Location: abdomen  -TM Primary Wound Type: Abrasion  -TM      Row Name 05/23/24 1216          Plan of Care Review    Plan of Care Reviewed With patient  -MOE     Outcome Evaluation Pt has been progressing since last evaluation. He has increased his ambulation distance and is participating much better.  Today he was limited by significant hip pain but given his recent progressing we will continue POC for 2 more weeks.  Goals will be updated below  -MOE       Row Name 05/23/24 1200          Positioning and Restraints    Pre-Treatment Position in bed  -VK     Post Treatment Position bed  -VK     In Bed fowlers;call light within reach;encouraged to call for assist;exit alarm on;with nsg  -VK       Row Name 05/23/24 1216          Therapy Assessment/Plan (PT)    Rehab Potential (PT) fair, will monitor progress closely  -MOE     Criteria for Skilled Interventions Met (PT) skilled treatment is necessary  -MOE     Therapy Frequency (PT) 6 times/wk  -MOE     Predicted Duration of Therapy Intervention (PT) 14 days  -MOE     Problem List (PT) problems related to;balance;mobility;strength  -MOE     Activity Limitations Related to Problem List (PT) unable to ambulate safely  -MOE       Row Name 05/23/24 1216 05/23/24 1200       Progress Summary (PT)    Progress Toward Functional Goals (PT) progress toward functional goals is fair  -MOE progress toward functional goals is fair  -VK    Daily Progress Summary (PT) -- Pt agreeable to treatment. Worked on bed mob, transfers, and ambulation. Pt was unable to ambulate more than 1ftx1 today due to severe L hip pain and popping. Pt  continues to benefit from skilled PT to address stated deficits.  -VK      Row Name 05/23/24 1216          Physical Therapy Goals    Bed Mobility Goal Selection (PT) bed mobility, PT goal 1  -MOE     Transfer Goal Selection (PT) transfer, PT goal 1  -MOE     Gait Training Goal Selection (PT) gait training, PT goal 1  -MOE       Row Name 05/23/24 1216          Bed Mobility Goal 1 (PT)    Activity/Assistive Device (Bed Mobility Goal 1, PT) bed mobility activities, all  -MOE     [place] Level/Cues Needed (Bed Mobility Goal 1, PT) independent  -MOE     Time Frame (Bed Mobility Goal 1, PT) long term goal (LTG);2 weeks  -MOE     Progress/Outcomes (Bed Mobility Goal 1, PT) continuing progress toward goal  -MOE       Row Name 05/23/24 1216          Transfer Goal 1 (PT)    Activity/Assistive Device (Transfer Goal 1, PT) transfers, all  -MOE     [place] Level/Cues Needed (Transfer Goal 1, PT) contact guard required  -MOE     Time Frame (Transfer Goal 1, PT) long term goal (LTG);2 weeks  -MOE     Progress/Outcome (Transfer Goal 1, PT) continuing progress toward goal  -MOE       Row Name 05/23/24 1216          Gait Training Goal 1 (PT)    Activity/Assistive Device (Gait Training Goal 1, PT) gait (walking locomotion);assistive device use;walker, rolling  -MOE     [place] Level (Gait Training Goal 1, PT) contact guard required  -MOE     Distance (Gait Training Goal 1, PT) 10  -MOE     Time Frame (Gait Training Goal 1, PT) long term goal (LTG);2 weeks  -MOE     Progress/Outcome (Gait Training Goal 1, PT) continuing progress toward goal  -MOE               User Key  (r) = Recorded By, (t) = Taken By, (c) = Cosigned By      Initials Name Provider Type    Rylee Yan, RN Registered Nurse    Bettye Anand, RN Registered Nurse    Nicole Elise RN Registered Nurse    Adilson Carmona LPN Licensed Nurse    Merlin De La O, PT Physical Therapist    Anya Palumbo, PTA Physical Therapist Assistant    STONE Robert  Almaz, RN Registered Nurse                    Physical Therapy Education       Title: PT OT SLP Therapies (Done)       Topic: Physical Therapy (Done)       Point: Mobility training (Done)       Learning Progress Summary             Patient Acceptance, E,D, DU by PG at 4/24/2024 1039    Acceptance, E,TB, VU by RM at 2/14/2024 0518    Acceptance, E,TB, VU by RM at 1/9/2024 0420    Acceptance, E, VU by CR at 12/30/2023 1750    Acceptance, E, VU by CR at 12/20/2023 1345    Acceptance, E, VU by CR at 12/19/2023 1004    Acceptance, E,TB, VU by RM at 11/30/2023 0420    Acceptance, E,TB, VU by MOE at 11/8/2023 1341                         Point: Precautions (Done)       Learning Progress Summary             Patient Acceptance, E,D, DU by PG at 4/24/2024 1039    Acceptance, E,TB, VU by RM at 2/14/2024 0518    Acceptance, E,TB, VU by RM at 1/9/2024 0420    Acceptance, E, VU by CR at 12/30/2023 1750    Acceptance, E,TB, VU by RM at 11/30/2023 0420    Acceptance, E,TB, VU by MOE at 11/8/2023 1341                                         User Key       Initials Effective Dates Name Provider Type Discipline     06/08/21 -  Bharath Berg, RN Registered Nurse Nurse    CR 06/13/22 -  Adilson Baker LPN Licensed Nurse Nurse    PG 06/16/21 -  Kodak Matos OT Occupational Therapist OT    MOE 06/03/21 -  Merlin Rao PT Physical Therapist PT                  PT Recommendation and Plan  Anticipated Discharge Disposition (PT): extended care facility  Planned Therapy Interventions (PT): bed mobility training, gait training, strengthening, transfer training  Therapy Frequency (PT): 6 times/wk  Progress Summary (PT)  Progress Toward Functional Goals (PT): progress toward functional goals is fair  Daily Progress Summary (PT): Pt was better able to tolerate ambulation today.  For the first time he was able to advance his RLE without manual assistance.  Will continure current plan  Barriers to Overall Progress (PT): Pt has made progress  over these last 10 days.  He is able to ambulate short distances with assistance of 2 and is performing BSC and chair transfers.  Will update plan accordingly and continue current plan for 10 more days.  Plan of Care Reviewed With: patient  Progress: improving  Outcome Evaluation: Pt has been progressing since last evaluation. He has increased his ambulation distance and is participating much better.  Today he was limited by significant hip pain but given his recent progressing we will continue POC for 2 more weeks.  Goals will be updated below   Outcome Measures       Row Name 05/23/24 1200 05/22/24 1300 05/21/24 1200       How much help from another person do you currently need...    Turning from your back to your side while in flat bed without using bedrails? 3  -VK 3  -VK 3  -VK    Moving from lying on back to sitting on the side of a flat bed without bedrails? 3  -VK 3  -VK 3  -VK    Moving to and from a bed to a chair (including a wheelchair)? 2  -VK 2  -VK 2  -VK    Standing up from a chair using your arms (e.g., wheelchair, bedside chair)? 2  -VK 2  -VK 2  -VK    Climbing 3-5 steps with a railing? 1  -VK 1  -VK 1  -VK    To walk in hospital room? 2  -VK 2  -VK 2  -VK    AM-PAC 6 Clicks Score (PT) 13  -VK 13  -VK 13  -VK    Highest Level of Mobility Goal 4 --> Transfer to chair/commode  -VK 4 --> Transfer to chair/commode  -VK 4 --> Transfer to chair/commode  -VK      Row Name 05/20/24 1441             How much help from another person do you currently need...    Turning from your back to your side while in flat bed without using bedrails? 3  -WM      Moving from lying on back to sitting on the side of a flat bed without bedrails? 3  -WM      Moving to and from a bed to a chair (including a wheelchair)? 2  -WM      Standing up from a chair using your arms (e.g., wheelchair, bedside chair)? 2  -WM      Climbing 3-5 steps with a railing? 1  -WM      To walk in hospital room? 1  -WM      AM-PAC 6 Clicks Score  (PT) 12  -WM      Highest Level of Mobility Goal 4 --> Transfer to chair/commode  -                User Key  (r) = Recorded By, (t) = Taken By, (c) = Cosigned By      Initials Name Provider Type    Carson Johnson PTA Physical Therapist Assistant    Anya Palumbo PTA Physical Therapist Assistant                     Time Calculation:    PT Charges       Row Name 05/23/24 1208             Time Calculation    PT Received On 05/23/24  -VK      PT Goal Re-Cert Due Date 06/06/24  -MOE         Timed Charges    08759 - PT Therapeutic Exercise Minutes 10  -VK      00180 - Gait Training Minutes  10  -VK      93773 - PT Therapeutic Activity Minutes 18  -VK         Untimed Charges    PT Eval/Re-eval Minutes 15  -MOE         SNF Physical Therapy Minutes    Skilled Minutes- PT 38 min  -VK         Total Minutes    Timed Charges Total Minutes 38  -VK      Untimed Charges Total Minutes 15  -MOE       Total Minutes 53  -MOE                User Key  (r) = Recorded By, (t) = Taken By, (c) = Cosigned By      Initials Name Provider Type    Merlin De La O, PT Physical Therapist    Anya Palumbo PTA Physical Therapist Assistant                  Therapy Charges for Today       Code Description Service Date Service Provider Modifiers Qty    [id number] HC PT RE-EVAL ESTABLISHED PLAN 2 5/23/2024 Merlin Rao, PT GP 1              PT G-Codes  Outcome Measure Options: AM-PAC 6 Clicks Daily Activity (OT), Optimal Instrument  AM-PAC 6 Clicks Score (PT): 13  AM-PAC 6 Clicks Score (OT): 14    Merlin Rao, PT  5/23/2024

## 2024-05-23 NOTE — PLAN OF CARE
Goal Outcome Evaluation:  Plan of Care Reviewed With: patient JACE, IVELISSEOX4. Makes needs known to staff. Pain medication X 3. Complaining of severe headaches X 2 days states it feels like sinuses its just on R side of face in eye and head region. Gave Tylenol but not really helping per RSD. Percocet given for back and hip pain. No other complaints.

## 2024-05-24 ENCOUNTER — APPOINTMENT (OUTPATIENT)
Dept: GENERAL RADIOLOGY | Facility: HOSPITAL | Age: 64
DRG: 948 | End: 2024-05-24
Payer: MEDICARE

## 2024-05-24 LAB
GLUCOSE BLDC GLUCOMTR-MCNC: 102 MG/DL (ref 70–99)
GLUCOSE BLDC GLUCOMTR-MCNC: 145 MG/DL (ref 70–99)
GLUCOSE BLDC GLUCOMTR-MCNC: 176 MG/DL (ref 70–99)
GLUCOSE BLDC GLUCOMTR-MCNC: 90 MG/DL (ref 70–99)

## 2024-05-24 PROCEDURE — 82948 REAGENT STRIP/BLOOD GLUCOSE: CPT | Performed by: INTERNAL MEDICINE

## 2024-05-24 PROCEDURE — 63710000001 INSULIN LISPRO (HUMAN) PER 5 UNITS: Performed by: PHYSICIAN ASSISTANT

## 2024-05-24 PROCEDURE — 63710000001 INSULIN GLARGINE PER 5 UNITS: Performed by: INTERNAL MEDICINE

## 2024-05-24 PROCEDURE — 82948 REAGENT STRIP/BLOOD GLUCOSE: CPT

## 2024-05-24 PROCEDURE — 97530 THERAPEUTIC ACTIVITIES: CPT

## 2024-05-24 PROCEDURE — 73502 X-RAY EXAM HIP UNI 2-3 VIEWS: CPT

## 2024-05-24 RX ADMIN — Medication 500 MG: at 09:49

## 2024-05-24 RX ADMIN — BACLOFEN 10 MG: 10 TABLET ORAL at 19:56

## 2024-05-24 RX ADMIN — Medication 1 APPLICATION: at 09:52

## 2024-05-24 RX ADMIN — DICLOFENAC SODIUM 4 G: 10 GEL TOPICAL at 08:13

## 2024-05-24 RX ADMIN — ATORVASTATIN CALCIUM 10 MG: 10 TABLET, FILM COATED ORAL at 21:55

## 2024-05-24 RX ADMIN — SERTRALINE HYDROCHLORIDE 50 MG: 50 TABLET ORAL at 21:55

## 2024-05-24 RX ADMIN — INSULIN GLARGINE 35 UNITS: 100 INJECTION, SOLUTION SUBCUTANEOUS at 09:49

## 2024-05-24 RX ADMIN — Medication 500 MG: at 21:55

## 2024-05-24 RX ADMIN — OXYCODONE AND ACETAMINOPHEN 1 TABLET: 10; 325 TABLET ORAL at 19:56

## 2024-05-24 RX ADMIN — BACLOFEN 10 MG: 10 TABLET ORAL at 01:18

## 2024-05-24 RX ADMIN — OXYCODONE AND ACETAMINOPHEN 1 TABLET: 10; 325 TABLET ORAL at 01:18

## 2024-05-24 RX ADMIN — APIXABAN 5 MG: 5 TABLET, FILM COATED ORAL at 09:49

## 2024-05-24 RX ADMIN — CYANOCOBALAMIN TAB 500 MCG 1000 MCG: 500 TAB at 09:49

## 2024-05-24 RX ADMIN — FUROSEMIDE 60 MG: 40 TABLET ORAL at 09:49

## 2024-05-24 RX ADMIN — Medication 10 MG: at 21:55

## 2024-05-24 RX ADMIN — PREGABALIN 100 MG: 100 CAPSULE ORAL at 09:49

## 2024-05-24 RX ADMIN — MONTELUKAST 10 MG: 10 TABLET, FILM COATED ORAL at 21:55

## 2024-05-24 RX ADMIN — CETIRIZINE HYDROCHLORIDE 10 MG: 10 TABLET, FILM COATED ORAL at 09:53

## 2024-05-24 RX ADMIN — ACETAMINOPHEN 650 MG: 325 TABLET ORAL at 21:55

## 2024-05-24 RX ADMIN — LISINOPRIL 2.5 MG: 2.5 TABLET ORAL at 09:49

## 2024-05-24 RX ADMIN — EMPAGLIFLOZIN 10 MG: 10 TABLET, FILM COATED ORAL at 09:49

## 2024-05-24 RX ADMIN — INSULIN GLARGINE 35 UNITS: 100 INJECTION, SOLUTION SUBCUTANEOUS at 21:54

## 2024-05-24 RX ADMIN — PREGABALIN 100 MG: 100 CAPSULE ORAL at 16:46

## 2024-05-24 RX ADMIN — FERROUS SULFATE TAB 325 MG (65 MG ELEMENTAL FE) 325 MG: 325 (65 FE) TAB at 08:13

## 2024-05-24 RX ADMIN — DICLOFENAC SODIUM 4 G: 10 GEL TOPICAL at 12:03

## 2024-05-24 RX ADMIN — Medication 1 APPLICATION: at 21:54

## 2024-05-24 RX ADMIN — DICLOFENAC SODIUM 4 G: 10 GEL TOPICAL at 21:54

## 2024-05-24 RX ADMIN — PREGABALIN 100 MG: 100 CAPSULE ORAL at 21:55

## 2024-05-24 RX ADMIN — OXYCODONE AND ACETAMINOPHEN 1 TABLET: 10; 325 TABLET ORAL at 09:49

## 2024-05-24 RX ADMIN — IBUPROFEN 400 MG: 400 TABLET ORAL at 07:37

## 2024-05-24 RX ADMIN — APIXABAN 5 MG: 5 TABLET, FILM COATED ORAL at 21:55

## 2024-05-24 RX ADMIN — INSULIN LISPRO 3 UNITS: 100 INJECTION, SOLUTION INTRAVENOUS; SUBCUTANEOUS at 21:54

## 2024-05-24 RX ADMIN — BACLOFEN 10 MG: 10 TABLET ORAL at 09:49

## 2024-05-24 NOTE — PLAN OF CARE
Goal Outcome Evaluation:  Plan of Care Reviewed With: patient        Progress: no change  Outcome Evaluation: Patient continues to have severe pain in left hip. Today patient also c/o sharp thobbing headache mainly to anterior and right side with sinus pressure behind right eye. Provider notified of sinus pain/ headache and new order received for Singulair nightly to start today. Patient given PRN Baclofen and Percocet at 0820 for headache and hip pain and again at 1643 for left hip pain. Med effective each occurance. Given PRN Ibuprofen at 1215 for headache. Med effective. Given PRN Tylenol at 1430 for break-through left hip pain. Med effective. Patient visited this morning by orthopedic Dr who discussed hip issues. See provider notes. Voices needs. Con't current POC.

## 2024-05-24 NOTE — THERAPY TREATMENT NOTE
Patient Name: Jose Shaikh  : 1960    MRN: [id number]                              Today's Date: 2024       Admit Date: 2023    Visit Dx:     ICD-10-CM ICD-9-CM   1. Difficulty in walking  R26.2 719.7   2. Type 2 diabetes mellitus with other specified complication, unspecified whether long term insulin use  E11.69 250.80     Patient Active Problem List   Diagnosis    Diabetic ulcer of left heel associated with type 2 DM    Acute osteomyelitis of left calcaneus     Diabetic ulcer of left heel associated with type 2 DM    Diabetic ulcer of right midfoot associated with type 2 DM    Paroxysmal atrial fibrillation    Essential hypertension    Hyperlipidemia LDL goal <100    Cellulitis and abscess of foot    High alkaline phosphatase level    Osteomyelitis    Onychomycosis    Onychocryptosis    Foot pain, bilateral    Osteomyelitis of foot, right, acute    Cellulitis of right foot    Type 2 diabetes mellitus, with long-term current use of insulin    Class 3 severe obesity due to excess calories with serious comorbidity and body mass index (BMI) of 45.0 to 49.9 in adult    Anxiety disorder, unspecified    Claustrophobia    Dependence on wheelchair    Depression, unspecified    Long term (current) use of anticoagulants    Long term (current) use of oral hypoglycemic drugs    Wound of foot    Ulcer of right foot    Orthostatic hypotension    Other chronic osteomyelitis, right ankle and foot    Personal history of nicotine dependence    Thrombocytopenia, unspecified    Unspecified open wound, right foot, initial encounter    Diabetic foot infection    Subacute osteomyelitis of right foot    Right foot pain    Sepsis    Onychomycosis    Foot pain, left    Impaired mobility and ADLs    Absence of toe of right foot    Corns and callosity    Disability of walking    Fracture    Limb swelling    Polyneuropathy    Pressure ulcer, stage 1    Shortness of breath    Generalized weakness    Debility    Ulcerated,  foot, left, limited to breakdown of skin    Onychomycosis     Past Medical History:   Diagnosis Date    Absence of toe of right foot     Acute osteomyelitis of left calcaneus  08/18/2021    Anxiety and depression     Arthritis     Cancer     Chronic pain     STATES HIS PAIN IS 10/10 AAT    Claustrophobia     Corns and callus     Diabetic ulcer of left heel associated with type 2 DM 08/18/2021    Diabetic ulcer of left heel associated with type 2 DM 07/06/2021    Diabetic ulcer of right midfoot associated with type 2 DM 08/18/2021    Difficulty walking     Essential hypertension 08/31/2021    Hammertoe     Hyperlipidemia LDL goal <100 08/31/2021    Ingrown toenail     Obesity     Paroxysmal atrial fibrillation 08/31/2021    Polyneuropathy     Pressure ulcer, stage 1     Tinea unguium     Type 2 diabetes mellitus with polyneuropathy      Past Surgical History:   Procedure Laterality Date    CYST REMOVAL      center of back; benign    EYE SURGERY      INCISION AND DRAINAGE ABSCESS      back    INCISION AND DRAINAGE LEG Right 12/10/2021    Procedure: INCISION AND DRAINAGE LOWER EXTREMITY;  Surgeon: Ash Leyva DPM;  Location: [facility];  Service: Podiatry;  Laterality: Right;    OTHER SURGICAL HISTORY      Surgical clips left foot    TOE SURGERY Right     Removal of 5th toe    TRANS METATARSAL AMPUTATION Right 12/02/2021    Procedure: AMPUTATION TRANS METATARSAL;  Surgeon: Ash Leyva DPM;  Location: [facility] OR;  Service: Podiatry;  Laterality: Right;    VASCULAR SURGERY      WRIST SURGERY Left     repair of injury      General Information       Row Name 05/24/24 1257          OT Time and Intention    Document Type therapy note (daily note)  -PG     Mode of Treatment individual therapy;occupational therapy  -PG               User Key  (r) = Recorded By, (t) = Taken By, (c) = Cosigned By      Initials Name Provider Type    PG Kodak Matos, BEKAH Occupational Therapist                      Mobility/ADL's       Row Name 05/24/24 1258          Transfers    Transfers sit-stand transfer;stand-sit transfer  -PG       Row Name 05/24/24 1258          Bed-Chair Transfer    Bed-Chair Barton (Transfers) moderate assist (50% patient effort);2 person assist  -PG     Assistive Device (Bed-Chair Transfers) walker, front-wheeled  -PG       Row Name 05/24/24 1258          Sit-Stand Transfer    Sit-Stand Barton (Transfers) moderate assist (50% patient effort);2 person assist;verbal cues  -PG     Assistive Device (Sit-Stand Transfers) bariatric;walker, front-wheeled  -PG               User Key  (r) = Recorded By, (t) = Taken By, (c) = Cosigned By      Initials Name Provider Type    Kodak Velazco OT Occupational Therapist                   Obj/Interventions    No documentation.                  Goals/Plan    No documentation.                  Clinical Impression       Row Name 05/24/24 1258          Plan of Care Review    Progress no change  -PG               User Key  (r) = Recorded By, (t) = Taken By, (c) = Cosigned By      Initials Name Provider Type    Kodak Velazco, BEKAH Occupational Therapist                   Outcome Measures       Row Name 05/24/24 1258          How much help from another is currently needed...    Putting on and taking off regular lower body clothing? 1  -PG     Bathing (including washing, rinsing, and drying) 2  -PG     Toileting (which includes using toilet bed pan or urinal) 1  -PG     Putting on and taking off regular upper body clothing 3  -PG     Taking care of personal grooming (such as brushing teeth) 3  -PG     Eating meals 4  -PG     AM-PAC 6 Clicks Score (OT) 14  -PG       Row Name 05/24/24 1243 05/24/24 1200       How much help from another person do you currently need...    Turning from your back to your side while in flat bed without using bedrails? 3  -VK 3  -CR    Moving from lying on back to sitting on the side of a flat bed without bedrails? 3  -VK 3  -CR     Moving to and from a bed to a chair (including a wheelchair)? 2  -VK 2  -CR    Standing up from a chair using your arms (e.g., wheelchair, bedside chair)? 2  -VK 2  -CR    Climbing 3-5 steps with a railing? 1  -VK 1  -CR    To walk in hospital room? 2  -VK 2  -CR    AM-PAC 6 Clicks Score (PT) 13  -VK 13  -CR    Highest Level of Mobility Goal 4 --> Transfer to chair/commode  -VK 4 --> Transfer to chair/commode  -CR      Row Name 05/24/24 0126          How much help from another person do you currently need...    Turning from your back to your side while in flat bed without using bedrails? 3  -FM     Moving from lying on back to sitting on the side of a flat bed without bedrails? 3  -FM     Moving to and from a bed to a chair (including a wheelchair)? 2  -FM     Standing up from a chair using your arms (e.g., wheelchair, bedside chair)? 2  -FM     Climbing 3-5 steps with a railing? 1  -FM     To walk in hospital room? 2  -FM     AM-PAC 6 Clicks Score (PT) 13  -FM     Highest Level of Mobility Goal 4 --> Transfer to chair/commode  -FM       Row Name 05/24/24 1258          Functional Assessment    Outcome Measure Options AM-PAC 6 Clicks Daily Activity (OT);Optimal Instrument  -PG       Row Name 05/24/24 1258          Optimal Instrument    Optimal Instrument Optimal - 3  -PG     Bending/Stooping 4  -PG     Standing 3  -PG     Reaching 2  -PG               User Key  (r) = Recorded By, (t) = Taken By, (c) = Cosigned By      Initials Name Provider Type    FM Patsy Cano, RN Registered Nurse    Adilson Carmona LPN Licensed Nurse    Kodak Velazco OT Occupational Therapist    Anya Palumbo PTA Physical Therapist Assistant                    Occupational Therapy Education       Title: PT OT SLP Therapies (Done)       Topic: Occupational Therapy (Done)       Point: ADL training (Done)       Description:   Instruct learner(s) on proper safety adaptation and remediation techniques during self care or transfers.    Instruct in proper use of assistive devices.                  Learning Progress Summary             Patient Acceptance, E,D, DU by PG at 4/24/2024 1039    Acceptance, E,TB, VU by RM at 2/14/2024 0518    Acceptance, E,TB, VU by RM at 1/9/2024 0420    Acceptance, E, VU by CR at 12/30/2023 1750    Acceptance, E,TB, VU by RM at 11/30/2023 0420    Acceptance, E,D, DU by PG at 11/7/2023 0932                         Point: Home exercise program (Done)       Description:   Instruct learner(s) on appropriate technique for monitoring, assisting and/or progressing therapeutic exercises/activities.                  Learning Progress Summary             Patient Acceptance, E,D, DU by PG at 4/24/2024 1039    Acceptance, E,TB, VU by RM at 2/14/2024 0518    Acceptance, E,TB, VU by RM at 1/9/2024 0420    Acceptance, E, VU by CR at 12/30/2023 1750    Acceptance, E,TB, VU by RM at 11/30/2023 0420    Acceptance, E,D, DU by PG at 11/7/2023 0932                         Point: Precautions (Done)       Description:   Instruct learner(s) on prescribed precautions during self-care and functional transfers.                  Learning Progress Summary             Patient Acceptance, E,D, DU by PG at 4/24/2024 1039    Acceptance, E,TB, VU by RM at 2/14/2024 0518    Acceptance, E,TB, VU by RM at 1/9/2024 0420    Acceptance, E, VU by CR at 12/30/2023 1750    Acceptance, E,TB, VU by RM at 11/30/2023 0420    Acceptance, E,D, DU by PG at 11/7/2023 0932                         Point: Body mechanics (Done)       Description:   Instruct learner(s) on proper positioning and spine alignment during self-care, functional mobility activities and/or exercises.                  Learning Progress Summary             Patient Acceptance, E,D, DU by PG at 4/24/2024 1039    Acceptance, E,TB, VU by RM at 2/14/2024 0518    Acceptance, E,TB, VU by RM at 1/9/2024 0420    Acceptance, E, VU by CR at 12/30/2023 1750    Acceptance, E,TB, VU by RM at 11/30/2023 4658     Acceptance, E,D, DU by PG at 11/7/2023 0932                                         User Key       Initials Effective Dates Name Provider Type Discipline     06/08/21 -  Bharath Berg RN Registered Nurse Nurse    CR 06/13/22 -  Adilson Baker LPN Licensed Nurse Nurse    PG 06/16/21 -  Kodak Matos OT Occupational Therapist OT                  OT Recommendation and Plan  Planned Therapy Interventions (OT): activity tolerance training, BADL retraining, transfer/mobility retraining, patient/caregiver education/training, occupation/activity based interventions, strengthening exercise  Therapy Frequency (OT): 5 times/wk  Plan of Care Review  Plan of Care Reviewed With: patient  Progress: no change  Outcome Evaluation: Patient completed recliner transfer with minimal assist x 2.  Patient was able to walk approximately 4 feet and then 2 feet with rest break in between.  Patient remains motivated and will continue to benefit from skilled OT services.     Time Calculation:   Evaluation Complexity (OT)  Review Occupational Profile/Medical/Therapy History Complexity: brief/low complexity  Assessment, Occupational Performance/Identification of Deficit Complexity: 3-5 performance deficits  Clinical Decision Making Complexity (OT): problem focused assessment/low complexity  Overall Complexity of Evaluation (OT): low complexity     Time Calculation- OT       Row Name 05/24/24 1259             Time Calculation- OT    OT Received On 05/24/24  -PG      OT Goal Re-Cert Due Date 06/01/24  -PG         Timed Charges    61769 - OT Therapeutic Activity Minutes 30  -PG         SNF Occupational Therapy Minutes    Skilled Minutes- OT 30 min  -PG         Total Minutes    Timed Charges Total Minutes 30  -PG       Total Minutes 30  -PG                User Key  (r) = Recorded By, (t) = Taken By, (c) = Cosigned By      Initials Name Provider Type    PG Kodak Matos OT Occupational Therapist                  Therapy Charges for Today        Code Description Service Date Service Provider Modifiers Qty    [id number]  OT THERAPEUTIC ACT EA 15 MIN 5/24/2024 Kodak Matos OT GO 2                 Kodak Matos OT  5/24/2024

## 2024-05-24 NOTE — THERAPY TREATMENT NOTE
SNF - Physical Therapy Treatment Note  KEO Dominguez     Patient Name: Jose Shaikh  : 1960  MRN: [id number]  Today's Date: 2024      Visit Dx:    ICD-10-CM ICD-9-CM   1. Difficulty in walking  R26.2 719.7   2. Type 2 diabetes mellitus with other specified complication, unspecified whether long term insulin use  E11.69 250.80     Patient Active Problem List   Diagnosis    Diabetic ulcer of left heel associated with type 2 DM    Acute osteomyelitis of left calcaneus     Diabetic ulcer of left heel associated with type 2 DM    Diabetic ulcer of right midfoot associated with type 2 DM    Paroxysmal atrial fibrillation    Essential hypertension    Hyperlipidemia LDL goal <100    Cellulitis and abscess of foot    High alkaline phosphatase level    Osteomyelitis    Onychomycosis    Onychocryptosis    Foot pain, bilateral    Osteomyelitis of foot, right, acute    Cellulitis of right foot    Type 2 diabetes mellitus, with long-term current use of insulin    Class 3 severe obesity due to excess calories with serious comorbidity and body mass index (BMI) of 45.0 to 49.9 in adult    Anxiety disorder, unspecified    Claustrophobia    Dependence on wheelchair    Depression, unspecified    Long term (current) use of anticoagulants    Long term (current) use of oral hypoglycemic drugs    Wound of foot    Ulcer of right foot    Orthostatic hypotension    Other chronic osteomyelitis, right ankle and foot    Personal history of nicotine dependence    Thrombocytopenia, unspecified    Unspecified open wound, right foot, initial encounter    Diabetic foot infection    Subacute osteomyelitis of right foot    Right foot pain    Sepsis    Onychomycosis    Foot pain, left    Impaired mobility and ADLs    Absence of toe of right foot    Corns and callosity    Disability of walking    Fracture    Limb swelling    Polyneuropathy    Pressure ulcer, stage 1    Shortness of breath    Generalized weakness    Debility    Ulcerated, foot,  left, limited to breakdown of skin    Onychomycosis     Past Medical History:   Diagnosis Date    Absence of toe of right foot     Acute osteomyelitis of left calcaneus  08/18/2021    Anxiety and depression     Arthritis     Cancer     Chronic pain     STATES HIS PAIN IS 10/10 AAT    Claustrophobia     Corns and callus     Diabetic ulcer of left heel associated with type 2 DM 08/18/2021    Diabetic ulcer of left heel associated with type 2 DM 07/06/2021    Diabetic ulcer of right midfoot associated with type 2 DM 08/18/2021    Difficulty walking     Essential hypertension 08/31/2021    Hammertoe     Hyperlipidemia LDL goal <100 08/31/2021    Ingrown toenail     Obesity     Paroxysmal atrial fibrillation 08/31/2021    Polyneuropathy     Pressure ulcer, stage 1     Tinea unguium     Type 2 diabetes mellitus with polyneuropathy      Past Surgical History:   Procedure Laterality Date    CYST REMOVAL      center of back; benign    EYE SURGERY      INCISION AND DRAINAGE ABSCESS      back    INCISION AND DRAINAGE LEG Right 12/10/2021    Procedure: INCISION AND DRAINAGE LOWER EXTREMITY;  Surgeon: Ash Leyva DPM;  Location: [facility];  Service: Podiatry;  Laterality: Right;    OTHER SURGICAL HISTORY      Surgical clips left foot    TOE SURGERY Right     Removal of 5th toe    TRANS METATARSAL AMPUTATION Right 12/02/2021    Procedure: AMPUTATION TRANS METATARSAL;  Surgeon: Ash Leyva DPM;  Location: [facility] OR;  Service: Podiatry;  Laterality: Right;    VASCULAR SURGERY      WRIST SURGERY Left     repair of injury       PT Assessment (Last 12 Hours)       PT Evaluation and Treatment       Row Name 05/24/24 1001          Physical Therapy Time and Intention    Subjective Information complains of;pain  -VK     Document Type therapy note (daily note)  -VK     Mode of Treatment individual therapy;physical therapy  -VK     Patient Effort good  -VK       Row Name 05/24/24 1008          General  Information    Patient Profile Reviewed yes  -VK     Existing Precautions/Restrictions fall  -VK       Row Name 05/24/24 1005          Pain Scale: FACES Pre/Post-Treatment    Pain: FACES Scale, Pretreatment 0-->no hurt  -VK     Posttreatment Pain Rating 4-->hurts little more  -VK     Pain Location - Side/Orientation Left  -VK     Pain Location - hip  -VK       Row Name 05/24/24 1005          Cognition    Affect/Mental Status (Cognition) WFL;anxious  -VK     Behavioral Issues (Cognition) difficulty managing stress  -VK     Orientation Status (Cognition) oriented to;person;place;situation  -VK     Personal Safety Interventions nonskid shoes/slippers when out of bed;gait belt;fall prevention program maintained  -VK       Row Name 05/24/24 1005          Mobility    Extremity Weight-bearing Status left lower extremity;right lower extremity  -VK     Left Lower Extremity (Weight-bearing Status) weight-bearing as tolerated (WBAT)  -VK     Right Lower Extremity (Weight-bearing Status) weight-bearing as tolerated (WBAT)  -VK       Row Name 05/24/24 1005          Bed Mobility    Supine-Sit [place] (Bed Mobility) minimum assist (75% patient effort);moderate assist (50% patient effort);1 person assist;verbal cues  -VK     Sit-Supine [place] (Bed Mobility) minimum assist (75% patient effort);2 person assist;verbal cues  -VK     Bed Mobility, Safety Issues decreased use of legs for bridging/pushing  -VK     Assistive Device (Bed Mobility) bed rails;head of bed elevated  -VK       [facility] Name 05/24/24 1005          Sit-Stand Transfer    Sit-Stand [place] (Transfers) moderate assist (50% patient effort);maximum assist (25% patient effort);2 person assist;verbal cues  -VK     Assistive Device (Sit-Stand Transfers) bariatric;walker, front-wheeled  -VK       Row Name 05/24/24 1005          Stand-Sit Transfer    Stand-Sit [place] (Transfers) moderate assist (50% patient effort);2 person assist;verbal cues  -VK      Assistive Device (Stand-Sit Transfers) bariatric;walker, front-wheeled  -       Row Name 05/24/24 1005          Gait/Stairs (Locomotion)    Reason Patient was unable to Ambulate Uncontrolled Pain;Excessive Weakness  -       Row Name 05/24/24 1005          Safety Issues, Functional Mobility    Impairments Affecting Function (Mobility) balance;endurance/activity tolerance;pain;strength  -       Row Name 05/24/24 1005          Balance    Balance Assessment standing static balance;sit to stand dynamic balance  -VK     Sit to Stand Dynamic Balance moderate assist;maximum assist;2-person assist;verbal cues  -VK     Static Standing Balance moderate assist;maximum assist;2-person assist;verbal cues  -VK     Balance Interventions sit to stand  x3  -       Row Name 05/24/24 1005          Knee (Therapeutic Exercise)    Knee Strengthening (Therapeutic Exercise) bilateral;SAQ (short arc quad);20 repititions;2 sets;4 lb free weight  -VK       Row Name             Wound 03/27/24 1045 Right anterior Skin Tear    Wound - Properties Group Placement Date: 03/27/24  -CR Placement Time: 1045  -CR Side: Right  -CR Orientation: anterior  -CR Primary Wound Type: Skin tear  -CR    Retired Wound - Properties Group Placement Date: 03/27/24  -CR Placement Time: 1045  -CR Side: Right  -CR Orientation: anterior  -CR Primary Wound Type: Skin tear  -CR    Retired Wound - Properties Group Date first assessed: 03/27/24  -CR Time first assessed: 1045  -CR Side: Right  -CR Primary Wound Type: Skin tear  -CR      Row Name             Wound 05/02/24 1033 Left posterior plantar    Wound - Properties Group Placement Date: 05/02/24  -KR Placement Time: 1033  -KR Side: Left  -KR Orientation: posterior  -KR Location: plantar  -KR    Retired Wound - Properties Group Placement Date: 05/02/24  -KR Placement Time: 1033  -KR Side: Left  -KR Orientation: posterior  -KR Location: plantar  -KR    Retired Wound - Properties Group Date first assessed: 05/02/24   -KR Time first assessed: 1033  -KR Side: Left  -KR Location: plantar  -KR      Row Name             Wound 05/09/24 2010 Right lateral abdomen Abrasion    Wound - Properties Group Placement Date: 05/09/24  -TM Placement Time: 2010 -TM Side: Right  -TM Orientation: lateral  -TM Location: abdomen  -TM Primary Wound Type: Abrasion  -TM    Retired Wound - Properties Group Placement Date: 05/09/24  -TM Placement Time: 2010 -TM Side: Right  -TM Orientation: lateral  -TM Location: abdomen  -TM Primary Wound Type: Abrasion  -TM    Retired Wound - Properties Group Date first assessed: 05/09/24  -TM Time first assessed: 2010 -TM Side: Right  -TM Location: abdomen  -TM Primary Wound Type: Abrasion  -TM      Row Name 05/24/24 1005          Positioning and Restraints    Pre-Treatment Position in bed  -VK     Post Treatment Position bed  -VK     In Bed fowlers;call light within reach;encouraged to call for assist  Pt refuses alarms  -VK       Row Name 05/24/24 1005          Progress Summary (PT)    Progress Toward Functional Goals (PT) progress toward functional goals is fair  -VK     Daily Progress Summary (PT) Pt agreeable to treatment. Worked on bed mobility, strengthening and transfers today. Pt was unable to ambulate today due to L hip pain. Pt appears to be mostly limited by pain and activity tolerance. Pt continues to benefit from skilled PT to address stated deficits.  -VK               User Key  (r) = Recorded By, (t) = Taken By, (c) = Cosigned By      Initials Name Provider Type    TM Rylee Orellana RN Registered Nurse    Adilson Carmona LPN Licensed Nurse    Anya Palumbo PTA Physical Therapist Assistant    Almaz Morales, RN Registered Nurse                  Section G              Section GG                       Physical Therapy Education       Title: PT OT SLP Therapies (Done)       Topic: Physical Therapy (Done)       Point: Mobility training (Done)       Learning Progress Summary             Patient  Acceptance, E,D, DU by PG at 4/24/2024 1039    Acceptance, E,TB, VU by RM at 2/14/2024 0518    Acceptance, E,TB, VU by RM at 1/9/2024 0420    Acceptance, E, VU by CR at 12/30/2023 1750    Acceptance, E, VU by CR at 12/20/2023 1345    Acceptance, E, VU by CR at 12/19/2023 1004    Acceptance, E,TB, VU by RM at 11/30/2023 0420    Acceptance, E,TB, VU by MOE at 11/8/2023 1341                         Point: Precautions (Done)       Learning Progress Summary             Patient Acceptance, E,D, DU by PG at 4/24/2024 1039    Acceptance, E,TB, VU by RM at 2/14/2024 0518    Acceptance, E,TB, VU by RM at 1/9/2024 0420    Acceptance, E, VU by CR at 12/30/2023 1750    Acceptance, E,TB, VU by RM at 11/30/2023 0420    Acceptance, E,TB, VU by MOE at 11/8/2023 1341                                         User Key       Initials Effective Dates Name Provider Type Discipline     06/08/21 -  Bharath Berg, RN Registered Nurse Nurse    CR 06/13/22 -  Adilson Baker LPN Licensed Nurse Nurse    PG 06/16/21 -  Kodak Matos OT Occupational Therapist OT    MOE 06/03/21 -  Merlin Rao, PT Physical Therapist PT                  PT Recommendation and Plan     Progress Summary (PT)  Progress Toward Functional Goals (PT): progress toward functional goals is fair  Daily Progress Summary (PT): Pt agreeable to treatment. Worked on bed mobility, strengthening and transfers today. Pt was unable to ambulate today due to L hip pain. Pt appears to be mostly limited by pain and activity tolerance. Pt continues to benefit from skilled PT to address stated deficits.   Outcome Measures       Row Name 05/24/24 1243 05/23/24 1200 05/22/24 1300       How much help from another person do you currently need...    Turning from your back to your side while in flat bed without using bedrails? 3  -VK 3  -VK 3  -VK    Moving from lying on back to sitting on the side of a flat bed without bedrails? 3  -VK 3  -VK 3  -VK    Moving to and from a bed to a chair  (including a wheelchair)? 2  -VK 2  -VK 2  -VK    Standing up from a chair using your arms (e.g., wheelchair, bedside chair)? 2  -VK 2  -VK 2  -VK    Climbing 3-5 steps with a railing? 1  -VK 1  -VK 1  -VK    To walk in hospital room? 2  -VK 2  -VK 2  -VK    AM-PAC 6 Clicks Score (PT) 13  -VK 13  -VK 13  -VK    Highest Level of Mobility Goal 4 --> Transfer to chair/commode  -VK 4 --> Transfer to chair/commode  -VK 4 --> Transfer to chair/commode  -VK              User Key  (r) = Recorded By, (t) = Taken By, (c) = Cosigned By      Initials Name Provider Type    Anya Palumbo PTA Physical Therapist Assistant                     Time Calculation:    PT Charges       Row Name 05/24/24 1224             Time Calculation    PT Received On 05/24/24  -VK         Timed Charges    74257 - PT Therapeutic Exercise Minutes 5  -VK      27851 - PT Therapeutic Activity Minutes 22  -VK         SNF Physical Therapy Minutes    Skilled Minutes- PT 27 min  -VK         Total Minutes    Timed Charges Total Minutes 27  -VK       Total Minutes 27  -VK                User Key  (r) = Recorded By, (t) = Taken By, (c) = Cosigned By      Initials Name Provider Type    Anya Palumbo PTA Physical Therapist Assistant                  Therapy Charges for Today       Code Description Service Date Service Provider Modifiers Qty    16129725521 HC PT THER PROC EA 15 MIN 5/23/2024 Anya Sam, PTA GP 1    50143984387 HC GAIT TRAINING EA 15 MIN 5/23/2024 Anya Sam PTA GP 1    75818688185 HC PT THERAPEUTIC ACT EA 15 MIN 5/23/2024 Anya Sam PTA GP 1    66071587819 HC PT THERAPEUTIC ACT EA 15 MIN 5/24/2024 Anya Sam PTA GP 2            PT G-Codes  Outcome Measure Options: AM-PAC 6 Clicks Daily Activity (OT), Optimal Instrument  AM-PAC 6 Clicks Score (PT): 13  AM-PAC 6 Clicks Score (OT): 14    Anya Sam PTA  5/24/2024

## 2024-05-24 NOTE — PLAN OF CARE
Goal Outcome Evaluation:           Progress: no change  Outcome Evaluation: Rsd. is alert and oriented x4, able to make needs known to staff.  Medicated with prn Oxycodone, and tylenol this shift, See emar.  Rsd. states medications effective.  Activity encouraged, but did not get out of bed.  Trapeze bar overhead, Rsd. about to reposition and weight shift, but refuses to turn.  Rsd. is incontinent of bowel at times, urinal at bedside.  Call light and personal items within reach.  Rsd. reminded to use call light for assistance, verbalizes understanding.  Will continue to monitor and notify on-coming staff.  Current plan of care remains in place at this time.

## 2024-05-24 NOTE — PLAN OF CARE
Goal Outcome Evaluation:   Alert and oriented and pleasant with staff. X3 Max assist for transfers and ambulation. X3 admin PRN pain medication with relief of symptoms noted. New order for xray Left hip received this shift. Sitting up in bed, call light in reach.

## 2024-05-24 NOTE — PROGRESS NOTES
" [facility]     Progress Note    Patient Name: Jose Shaikh  : 1960  MRN: [id number]  Primary Care Physician:  Delia Cervantes, VALENTÍN  Date of admission: 2023    Subjective   Subjective     HPI:  The patient was previously evaluated in March for left hip osteoarthritis.  At that time he had just had a left hip steroid injection a few days before that did not help his symptoms much at all.  He continues to have left hip pain and disability.  He has been working with therapy and has been able to walk up to 6 feet with them.  He has lost some weight over the last few months and is currently at 346 pounds.    Review of Systems   See HPI    Objective   Objective     Vitals:   Temp:  [97.7 °F (36.5 °C)-98.1 °F (36.7 °C)] 98.1 °F (36.7 °C)  Heart Rate:  [74-77] 77  Resp:  [18] 18  BP: (100-101)/(50-51) 101/51  Physical Exam    General: Alert, no acute distress   Chest: Unlabored breathing, cardiovascular: Regular heart rate   Musculoskeletal: Neurovascular intact extremity.  Positive pulses.  Reduced range of motion of the hip.  Pain with hip range of motion.    Result Review      No results found for: \"WBC\", \"RBC\", \"HGB\", \"HCT\", \"MCV\", \"MCH\", \"MCHC\", \"RDW\", \"RDWSD\", \"MPV\", \"PLT\", \"NEUTRORELPCT\", \"LYMPHORELPCT\", \"MONORELPCT\", \"EOSRELPCT\", \"BASORELPCT\", \"AUTOIGPER\", \"NEUTROABS\", \"LYMPHSABS\", \"MONOSABS\", \"EOSABS\", \"BASOSABS\", \"AUTOIGNUM\", \"NRBC\"     Result Review:  I have personally reviewed the results from the time of this admission to 2024 20:43 EDT and agree with these findings:  []  Laboratory  []  Microbiology  []  Radiology  []  EKG/Telemetry   []  Cardiology/Vascular   []  Pathology  []  Old records  []  Other:      Assessment & Plan   Assessment / Plan     Brief Patient Summary:  Jose Shaikh is a 63 y.o. male who has left hip osteoarthritis    Active Hospital Problems:  Active Hospital Problems    Diagnosis     **Debility     Ulcerated, foot, left, limited to breakdown of skin     " Onychomycosis     Ulcer of right foot      Plan:   I was contacted by administration to reevaluate Mr. Shaikh.  It is my opinion that due to his morbid obesity he is still high risk for hip replacement surgery.  However there are some extenuating circumstances that may make hip replacement surgery considerable at this time.  The patient has been admitted at the inpatient rehab facility at the hospital since November.  Therapy has worked extensively with him and he has achieved some weight loss.  At this time he, as well as therapy thinks that his hip disability is the main cause preventing him from doing more physical activity and being able to discharge from the rehab facility.  I discussed with the patient that he is at high risk for complication from hip replacement surgery.  He expressed understanding of this but reiterated that he was still willing to proceed with the procedure because of the severity of his symptoms and physical imitations.  Therefore we will obtain a new x-ray of hip and look at scheduling possibilities for the near future.    DVT prophylaxis:  Medical DVT prophylaxis orders are present.        CODE STATUS:   Code Status (Patient has no pulse and is not breathing): CPR (Attempt to Resuscitate)  Medical Interventions (Patient has pulse or is breathing): Full Support    Disposition:  I expect patient to be discharged when medically able.    Electronically signed by Jose Cox MD, 05/23/24, 8:43 PM EDT.

## 2024-05-25 LAB
GLUCOSE BLDC GLUCOMTR-MCNC: 114 MG/DL (ref 70–99)
GLUCOSE BLDC GLUCOMTR-MCNC: 146 MG/DL (ref 70–99)
GLUCOSE BLDC GLUCOMTR-MCNC: 154 MG/DL (ref 70–99)
GLUCOSE BLDC GLUCOMTR-MCNC: 170 MG/DL (ref 70–99)
GLUCOSE BLDC GLUCOMTR-MCNC: 94 MG/DL (ref 70–99)

## 2024-05-25 PROCEDURE — 97110 THERAPEUTIC EXERCISES: CPT

## 2024-05-25 PROCEDURE — 82948 REAGENT STRIP/BLOOD GLUCOSE: CPT

## 2024-05-25 PROCEDURE — 63710000001 INSULIN GLARGINE PER 5 UNITS: Performed by: INTERNAL MEDICINE

## 2024-05-25 PROCEDURE — 82948 REAGENT STRIP/BLOOD GLUCOSE: CPT | Performed by: INTERNAL MEDICINE

## 2024-05-25 PROCEDURE — 63710000001 INSULIN LISPRO (HUMAN) PER 5 UNITS: Performed by: PHYSICIAN ASSISTANT

## 2024-05-25 RX ADMIN — ACETAMINOPHEN 650 MG: 325 TABLET ORAL at 08:55

## 2024-05-25 RX ADMIN — OXYCODONE AND ACETAMINOPHEN 1 TABLET: 10; 325 TABLET ORAL at 04:01

## 2024-05-25 RX ADMIN — Medication 500 MG: at 20:17

## 2024-05-25 RX ADMIN — BACLOFEN 10 MG: 10 TABLET ORAL at 11:43

## 2024-05-25 RX ADMIN — BACLOFEN 10 MG: 10 TABLET ORAL at 04:01

## 2024-05-25 RX ADMIN — ATORVASTATIN CALCIUM 10 MG: 10 TABLET, FILM COATED ORAL at 20:17

## 2024-05-25 RX ADMIN — ACETAMINOPHEN 650 MG: 325 TABLET ORAL at 21:20

## 2024-05-25 RX ADMIN — Medication 1 APPLICATION: at 22:34

## 2024-05-25 RX ADMIN — DICLOFENAC SODIUM 4 G: 10 GEL TOPICAL at 20:16

## 2024-05-25 RX ADMIN — FUROSEMIDE 60 MG: 40 TABLET ORAL at 08:55

## 2024-05-25 RX ADMIN — APIXABAN 5 MG: 5 TABLET, FILM COATED ORAL at 20:17

## 2024-05-25 RX ADMIN — Medication 500 MG: at 08:55

## 2024-05-25 RX ADMIN — PREGABALIN 100 MG: 100 CAPSULE ORAL at 16:27

## 2024-05-25 RX ADMIN — SERTRALINE HYDROCHLORIDE 50 MG: 50 TABLET ORAL at 20:17

## 2024-05-25 RX ADMIN — BACLOFEN 10 MG: 10 TABLET ORAL at 20:17

## 2024-05-25 RX ADMIN — MONTELUKAST 10 MG: 10 TABLET, FILM COATED ORAL at 20:17

## 2024-05-25 RX ADMIN — PREGABALIN 100 MG: 100 CAPSULE ORAL at 08:55

## 2024-05-25 RX ADMIN — ACETAMINOPHEN 650 MG: 325 TABLET ORAL at 03:02

## 2024-05-25 RX ADMIN — PREGABALIN 100 MG: 100 CAPSULE ORAL at 20:17

## 2024-05-25 RX ADMIN — LISINOPRIL 2.5 MG: 2.5 TABLET ORAL at 08:57

## 2024-05-25 RX ADMIN — CYANOCOBALAMIN TAB 500 MCG 1000 MCG: 500 TAB at 08:55

## 2024-05-25 RX ADMIN — POLYETHYLENE GLYCOL 400 AND PROPYLENE GLYCOL 1 DROP: 4; 3 SOLUTION/ DROPS OPHTHALMIC at 08:56

## 2024-05-25 RX ADMIN — APIXABAN 5 MG: 5 TABLET, FILM COATED ORAL at 08:55

## 2024-05-25 RX ADMIN — OXYCODONE AND ACETAMINOPHEN 1 TABLET: 10; 325 TABLET ORAL at 11:43

## 2024-05-25 RX ADMIN — EMPAGLIFLOZIN 10 MG: 10 TABLET, FILM COATED ORAL at 08:55

## 2024-05-25 RX ADMIN — INSULIN LISPRO 3 UNITS: 100 INJECTION, SOLUTION INTRAVENOUS; SUBCUTANEOUS at 11:59

## 2024-05-25 RX ADMIN — INSULIN GLARGINE 35 UNITS: 100 INJECTION, SOLUTION SUBCUTANEOUS at 08:54

## 2024-05-25 RX ADMIN — FERROUS SULFATE TAB 325 MG (65 MG ELEMENTAL FE) 325 MG: 325 (65 FE) TAB at 08:08

## 2024-05-25 RX ADMIN — Medication 10 MG: at 20:17

## 2024-05-25 RX ADMIN — DICLOFENAC SODIUM 4 G: 10 GEL TOPICAL at 08:08

## 2024-05-25 RX ADMIN — INSULIN GLARGINE 35 UNITS: 100 INJECTION, SOLUTION SUBCUTANEOUS at 20:17

## 2024-05-25 RX ADMIN — Medication 1 APPLICATION: at 10:39

## 2024-05-25 RX ADMIN — CETIRIZINE HYDROCHLORIDE 10 MG: 10 TABLET, FILM COATED ORAL at 08:55

## 2024-05-25 RX ADMIN — OXYCODONE AND ACETAMINOPHEN 1 TABLET: 10; 325 TABLET ORAL at 20:17

## 2024-05-25 NOTE — PLAN OF CARE
Goal Outcome Evaluation:   Alert and oriented and pleasant with staff. x3Max for transfers and ambulation. X3 admin PRN pain medication with relief of symptoms noted Shows improved endurance this shift. Sitting up in bed, call light in reach.

## 2024-05-25 NOTE — THERAPY TREATMENT NOTE
SNF - Physical Therapy Progress Note  KEO Dominguez     Patient Name: Jose Shaikh  : 1960  MRN: [id number]  Today's Date: 2024      Visit Dx:    ICD-10-CM ICD-9-CM   1. Difficulty in walking  R26.2 719.7   2. Type 2 diabetes mellitus with other specified complication, unspecified whether long term insulin use  E11.69 250.80     Patient Active Problem List   Diagnosis    Diabetic ulcer of left heel associated with type 2 DM    Acute osteomyelitis of left calcaneus     Diabetic ulcer of left heel associated with type 2 DM    Diabetic ulcer of right midfoot associated with type 2 DM    Paroxysmal atrial fibrillation    Essential hypertension    Hyperlipidemia LDL goal <100    Cellulitis and abscess of foot    High alkaline phosphatase level    Osteomyelitis    Onychomycosis    Onychocryptosis    Foot pain, bilateral    Osteomyelitis of foot, right, acute    Cellulitis of right foot    Type 2 diabetes mellitus, with long-term current use of insulin    Class 3 severe obesity due to excess calories with serious comorbidity and body mass index (BMI) of 45.0 to 49.9 in adult    Anxiety disorder, unspecified    Claustrophobia    Dependence on wheelchair    Depression, unspecified    Long term (current) use of anticoagulants    Long term (current) use of oral hypoglycemic drugs    Wound of foot    Ulcer of right foot    Orthostatic hypotension    Other chronic osteomyelitis, right ankle and foot    Personal history of nicotine dependence    Thrombocytopenia, unspecified    Unspecified open wound, right foot, initial encounter    Diabetic foot infection    Subacute osteomyelitis of right foot    Right foot pain    Sepsis    Onychomycosis    Foot pain, left    Impaired mobility and ADLs    Absence of toe of right foot    Corns and callosity    Disability of walking    Fracture    Limb swelling    Polyneuropathy    Pressure ulcer, stage 1    Shortness of breath    Generalized weakness    Debility    Ulcerated, foot,  left, limited to breakdown of skin    Onychomycosis     Past Medical History:   Diagnosis Date    Absence of toe of right foot     Acute osteomyelitis of left calcaneus  08/18/2021    Anxiety and depression     Arthritis     Cancer     Chronic pain     STATES HIS PAIN IS 10/10 AAT    Claustrophobia     Corns and callus     Diabetic ulcer of left heel associated with type 2 DM 08/18/2021    Diabetic ulcer of left heel associated with type 2 DM 07/06/2021    Diabetic ulcer of right midfoot associated with type 2 DM 08/18/2021    Difficulty walking     Essential hypertension 08/31/2021    Hammertoe     Hyperlipidemia LDL goal <100 08/31/2021    Ingrown toenail     Obesity     Paroxysmal atrial fibrillation 08/31/2021    Polyneuropathy     Pressure ulcer, stage 1     Tinea unguium     Type 2 diabetes mellitus with polyneuropathy      Past Surgical History:   Procedure Laterality Date    CYST REMOVAL      center of back; benign    EYE SURGERY      INCISION AND DRAINAGE ABSCESS      back    INCISION AND DRAINAGE LEG Right 12/10/2021    Procedure: INCISION AND DRAINAGE LOWER EXTREMITY;  Surgeon: Ash Leyva DPM;  Location: [facility];  Service: Podiatry;  Laterality: Right;    OTHER SURGICAL HISTORY      Surgical clips left foot    TOE SURGERY Right     Removal of 5th toe    TRANS METATARSAL AMPUTATION Right 12/02/2021    Procedure: AMPUTATION TRANS METATARSAL;  Surgeon: Ash Leyva DPM;  Location: [facility];  Service: Podiatry;  Laterality: Right;    VASCULAR SURGERY      WRIST SURGERY Left     repair of injury       PT Assessment (Last 12 Hours)       PT Evaluation and Treatment       Row Name 05/25/24 1700          Physical Therapy Time and Intention    Subjective Information complains of;pain  -CS     Document Type therapy note (daily note)  -CS     Mode of Treatment individual therapy;physical therapy  -CS     Patient Effort good  -CS     Symptoms Noted During/After Treatment  fatigue;increased pain  -CS       Row Name 05/25/24 1700          Pain    Pretreatment Pain Rating 8/10  -CS     Posttreatment Pain Rating 10/10  -CS     Pain Location - Side/Orientation Left  -CS     Pain Location generalized  -CS     Pain Location - hip  -CS     Pain Intervention(s) Rest;Distraction  -CS       Row Name 05/25/24 1700          Hip (Therapeutic Exercise)    Hip AAROM (Therapeutic Exercise) bilateral;aBduction;aDduction;external rotation;internal rotation;supine;10 repetitions;3 sets  heel slides  -CS     Hip Isometrics (Therapeutic Exercise) bilateral;gluteal sets;supine;10 repetitions;3 sets  -CS       Row Name 05/25/24 1700          Knee (Therapeutic Exercise)    Knee AAROM (Therapeutic Exercise) bilateral;supine  x 50 reps SAQ's, x 100 reps SLR's  -CS     Knee Isometrics (Therapeutic Exercise) bilateral;quad sets;supine;10 repetitions;3 sets  -CS       Row Name 05/25/24 1700          Ankle (Therapeutic Exercise)    Ankle AROM (Therapeutic Exercise) bilateral;dorsiflexion;plantarflexion;supine;30 repititions  -CS       Row Name             Wound 03/27/24 1045 Right anterior Skin Tear    Wound - Properties Group Placement Date: 03/27/24  -CR Placement Time: 1045  -CR Side: Right  -CR Orientation: anterior  -CR Primary Wound Type: Skin tear  -CR    Retired Wound - Properties Group Placement Date: 03/27/24  -CR Placement Time: 1045  -CR Side: Right  -CR Orientation: anterior  -CR Primary Wound Type: Skin tear  -CR    Retired Wound - Properties Group Date first assessed: 03/27/24  -CR Time first assessed: 1045  -CR Side: Right  -CR Primary Wound Type: Skin tear  -CR      Row Name             Wound 05/02/24 1033 Left posterior plantar    Wound - Properties Group Placement Date: 05/02/24  -KR Placement Time: 1033  -KR Side: Left  -KR Orientation: posterior  -KR Location: plantar  -KR    Retired Wound - Properties Group Placement Date: 05/02/24  -KR Placement Time: 1033  -KR Side: Left  -KR Orientation:  posterior  -KR Location: plantar  -KR    Retired Wound - Properties Group Date first assessed: 05/02/24  -KR Time first assessed: 1033  -KR Side: Left  -KR Location: plantar  -KR      Row Name             Wound 05/09/24 2010 Right lateral abdomen Abrasion    Wound - Properties Group Placement Date: 05/09/24  -TM Placement Time: 2010 -TM Side: Right  -TM Orientation: lateral  -TM Location: abdomen  -TM Primary Wound Type: Abrasion  -TM    Retired Wound - Properties Group Placement Date: 05/09/24  -TM Placement Time: 2010 -TM Side: Right  -TM Orientation: lateral  -TM Location: abdomen  -TM Primary Wound Type: Abrasion  -TM    Retired Wound - Properties Group Date first assessed: 05/09/24  -TM Time first assessed: 2010 -TM Side: Right  -TM Location: abdomen  -TM Primary Wound Type: Abrasion  -TM      Row Name 05/25/24 1700          Positioning and Restraints    Pre-Treatment Position in bed  -CS     Post Treatment Position bed  -CS     In Bed fowlers;call light within reach;encouraged to call for assist;LUE elevated  no alarms active upon entering room  -CS       Row Name 05/25/24 1700          Progress Summary (PT)    Progress Toward Functional Goals (PT) progress toward functional goals is fair  -CS               User Key  (r) = Recorded By, (t) = Taken By, (c) = Cosigned By      Initials Name Provider Type    TM Rylee Orellana, RN Registered Nurse    Adilson Carmona LPN Licensed Nurse    Ronald Huerta PTA Physical Therapist Assistant    Almaz Morales RN Registered Nurse                  Section G              Section GG                       Physical Therapy Education       Title: PT OT SLP Therapies (Done)       Topic: Physical Therapy (Done)       Point: Mobility training (Done)       Learning Progress Summary             Patient Acceptance, E,D, DU by PG at 4/24/2024 1039    Acceptance, E,TB, VU by RM at 2/14/2024 0518    Acceptance, E,TB, VU by RM at 1/9/2024 0420    Acceptance, E, VU by CR at  12/30/2023 1750    Acceptance, E, VU by CR at 12/20/2023 1345    Acceptance, E, VU by CR at 12/19/2023 1004    Acceptance, E,TB, VU by RM at 11/30/2023 0420    Acceptance, E,TB, VU by MOE at 11/8/2023 1341                         Point: Precautions (Done)       Learning Progress Summary             Patient Acceptance, E,D, DU by PG at 4/24/2024 1039    Acceptance, E,TB, VU by RM at 2/14/2024 0518    Acceptance, E,TB, VU by RM at 1/9/2024 0420    Acceptance, E, VU by CR at 12/30/2023 1750    Acceptance, E,TB, VU by RM at 11/30/2023 0420    Acceptance, E,TB, VU by MOE at 11/8/2023 1341                                         User Key       Initials Effective Dates Name Provider Type Discipline     06/08/21 -  Bharath Berg RN Registered Nurse Nurse    CR 06/13/22 -  Adilson Baker LPN Licensed Nurse Nurse    PG 06/16/21 -  Kodak Matos OT Occupational Therapist OT    MOE 06/03/21 -  Merlin Rao, PT Physical Therapist PT                  PT Recommendation and Plan     Progress Summary (PT)  Progress Toward Functional Goals (PT): progress toward functional goals is fair  Daily Progress Summary (PT): Decreased assistance for ther-ex's today in comparison to treatment I performed with pt. last weekend.  Pt. still requires assistance but overall, assistance level decreased with range of motion in bilateral lower extremeties.   Outcome Measures       Row Name 05/25/24 1700 05/24/24 1243 05/23/24 1200       How much help from another person do you currently need...    Turning from your back to your side while in flat bed without using bedrails? 3  -CS 3  -VK 3  -VK    Moving from lying on back to sitting on the side of a flat bed without bedrails? 3  -CS 3  -VK 3  -VK    Moving to and from a bed to a chair (including a wheelchair)? 2  -CS 2  -VK 2  -VK    Standing up from a chair using your arms (e.g., wheelchair, bedside chair)? 2  -CS 2  -VK 2  -VK    Climbing 3-5 steps with a railing? 1  -CS 1  -VK 1  -VK    To  walk in hospital room? 2  -CS 2  -VK 2  -VK    AM-PAC 6 Clicks Score (PT) 13  -CS 13  -VK 13  -VK    Highest Level of Mobility Goal 4 --> Transfer to chair/commode  -CS 4 --> Transfer to chair/commode  -VK 4 --> Transfer to chair/commode  -VK       Functional Assessment    Outcome Measure Options AM-PAC 6 Clicks Basic Mobility (PT)  -CS -- --              User Key  (r) = Recorded By, (t) = Taken By, (c) = Cosigned By      Initials Name Provider Type    CS Ronald Fabian PTA Physical Therapist Assistant    Anya Palumbo PTA Physical Therapist Assistant                     Time Calculation:    PT Charges       Row Name 05/25/24 1737             Time Calculation    Start Time 1300  -CS      PT Received On 05/25/24  -CS         Timed Charges    92649 - PT Therapeutic Exercise Minutes 37  -CS      63241 - PT Therapeutic Activity Minutes 6  -CS         SNF Physical Therapy Minutes    Skilled Minutes- PT 43 min  -CS         Total Minutes    Timed Charges Total Minutes 43  -CS       Total Minutes 43  -CS                User Key  (r) = Recorded By, (t) = Taken By, (c) = Cosigned By      Initials Name Provider Type     Ronald Fabian PTA Physical Therapist Assistant                  Therapy Charges for Today       Code Description Service Date Service Provider Modifiers Qty    83474881582 HC PT THER PROC EA 15 MIN 5/25/2024 Ronald Fabian PTA GP 3            PT G-Codes  Outcome Measure Options: AM-PAC 6 Clicks Basic Mobility (PT)  AM-PAC 6 Clicks Score (PT): 13  AM-PAC 6 Clicks Score (OT): 14    Ronald Fabian PTA  5/25/2024

## 2024-05-25 NOTE — PLAN OF CARE
Goal Outcome Evaluation:  Plan of Care Reviewed With: patient        Progress: no change  Outcome Evaluation: No significant change. Alert and oriented X 4. Baclofen, Percocet, and Tylenol administered for pain management to left hip, shoulder, and headache. Continues with use of trapeze for repositioning. Continue plan of care.

## 2024-05-26 LAB
GLUCOSE BLDC GLUCOMTR-MCNC: 110 MG/DL (ref 70–99)
GLUCOSE BLDC GLUCOMTR-MCNC: 115 MG/DL (ref 70–99)
GLUCOSE BLDC GLUCOMTR-MCNC: 154 MG/DL (ref 70–99)
GLUCOSE BLDC GLUCOMTR-MCNC: 79 MG/DL (ref 70–99)

## 2024-05-26 PROCEDURE — 82948 REAGENT STRIP/BLOOD GLUCOSE: CPT | Performed by: INTERNAL MEDICINE

## 2024-05-26 PROCEDURE — 82948 REAGENT STRIP/BLOOD GLUCOSE: CPT

## 2024-05-26 PROCEDURE — 63710000001 INSULIN LISPRO (HUMAN) PER 5 UNITS: Performed by: PHYSICIAN ASSISTANT

## 2024-05-26 PROCEDURE — 63710000001 INSULIN GLARGINE PER 5 UNITS: Performed by: INTERNAL MEDICINE

## 2024-05-26 PROCEDURE — 97110 THERAPEUTIC EXERCISES: CPT

## 2024-05-26 RX ADMIN — INSULIN GLARGINE 35 UNITS: 100 INJECTION, SOLUTION SUBCUTANEOUS at 21:39

## 2024-05-26 RX ADMIN — INSULIN GLARGINE 35 UNITS: 100 INJECTION, SOLUTION SUBCUTANEOUS at 09:49

## 2024-05-26 RX ADMIN — Medication 500 MG: at 21:40

## 2024-05-26 RX ADMIN — INSULIN LISPRO 3 UNITS: 100 INJECTION, SOLUTION INTRAVENOUS; SUBCUTANEOUS at 12:02

## 2024-05-26 RX ADMIN — BACLOFEN 10 MG: 10 TABLET ORAL at 14:31

## 2024-05-26 RX ADMIN — PREGABALIN 100 MG: 100 CAPSULE ORAL at 21:40

## 2024-05-26 RX ADMIN — DICLOFENAC SODIUM 4 G: 10 GEL TOPICAL at 08:14

## 2024-05-26 RX ADMIN — BACLOFEN 10 MG: 10 TABLET ORAL at 04:30

## 2024-05-26 RX ADMIN — OXYCODONE AND ACETAMINOPHEN 1 TABLET: 10; 325 TABLET ORAL at 22:50

## 2024-05-26 RX ADMIN — CETIRIZINE HYDROCHLORIDE 10 MG: 10 TABLET, FILM COATED ORAL at 09:49

## 2024-05-26 RX ADMIN — PREGABALIN 100 MG: 100 CAPSULE ORAL at 09:49

## 2024-05-26 RX ADMIN — CYANOCOBALAMIN TAB 500 MCG 1000 MCG: 500 TAB at 09:49

## 2024-05-26 RX ADMIN — SIMETHICONE 80 MG: 80 TABLET, CHEWABLE ORAL at 04:30

## 2024-05-26 RX ADMIN — OXYCODONE AND ACETAMINOPHEN 1 TABLET: 10; 325 TABLET ORAL at 04:30

## 2024-05-26 RX ADMIN — MONTELUKAST 10 MG: 10 TABLET, FILM COATED ORAL at 21:39

## 2024-05-26 RX ADMIN — DICLOFENAC SODIUM 4 G: 10 GEL TOPICAL at 21:39

## 2024-05-26 RX ADMIN — EMPAGLIFLOZIN 10 MG: 10 TABLET, FILM COATED ORAL at 09:49

## 2024-05-26 RX ADMIN — IBUPROFEN 400 MG: 400 TABLET ORAL at 08:17

## 2024-05-26 RX ADMIN — Medication 1 APPLICATION: at 21:38

## 2024-05-26 RX ADMIN — APIXABAN 5 MG: 5 TABLET, FILM COATED ORAL at 21:40

## 2024-05-26 RX ADMIN — ATORVASTATIN CALCIUM 10 MG: 10 TABLET, FILM COATED ORAL at 21:40

## 2024-05-26 RX ADMIN — FUROSEMIDE 60 MG: 40 TABLET ORAL at 09:49

## 2024-05-26 RX ADMIN — Medication 500 MG: at 09:49

## 2024-05-26 RX ADMIN — OXYCODONE AND ACETAMINOPHEN 1 TABLET: 10; 325 TABLET ORAL at 14:31

## 2024-05-26 RX ADMIN — BACLOFEN 10 MG: 10 TABLET ORAL at 22:50

## 2024-05-26 RX ADMIN — LISINOPRIL 2.5 MG: 2.5 TABLET ORAL at 09:49

## 2024-05-26 RX ADMIN — SERTRALINE HYDROCHLORIDE 50 MG: 50 TABLET ORAL at 21:40

## 2024-05-26 RX ADMIN — PREGABALIN 100 MG: 100 CAPSULE ORAL at 16:29

## 2024-05-26 RX ADMIN — APIXABAN 5 MG: 5 TABLET, FILM COATED ORAL at 09:49

## 2024-05-26 RX ADMIN — FERROUS SULFATE TAB 325 MG (65 MG ELEMENTAL FE) 325 MG: 325 (65 FE) TAB at 08:14

## 2024-05-26 RX ADMIN — Medication 1 APPLICATION: at 12:02

## 2024-05-26 NOTE — THERAPY TREATMENT NOTE
SNF - Physical Therapy Progress Note  [facility] Dominguez     Patient Name: Jose Shaikh  : 1960  MRN: [id number]  Today's Date: 2024      Visit Dx:    ICD-10-CM ICD-9-CM   1. Difficulty in walking  R26.2 719.7   2. Type 2 diabetes mellitus with other specified complication, unspecified whether long term insulin use  E11.69 250.80     Patient Active Problem List   Diagnosis    Diabetic ulcer of left heel associated with type 2 DM    Acute osteomyelitis of left calcaneus     Diabetic ulcer of left heel associated with type 2 DM    Diabetic ulcer of right midfoot associated with type 2 DM    Paroxysmal atrial fibrillation    Essential hypertension    Hyperlipidemia LDL goal <100    Cellulitis and abscess of foot    High alkaline phosphatase level    Osteomyelitis    Onychomycosis    Onychocryptosis    Foot pain, bilateral    Osteomyelitis of foot, right, acute    Cellulitis of right foot    Type 2 diabetes mellitus, with long-term current use of insulin    Class 3 severe obesity due to excess calories with serious comorbidity and body mass index (BMI) of 45.0 to 49.9 in adult    Anxiety disorder, unspecified    Claustrophobia    Dependence on wheelchair    Depression, unspecified    Long term (current) use of anticoagulants    Long term (current) use of oral hypoglycemic drugs    Wound of foot    Ulcer of right foot    Orthostatic hypotension    Other chronic osteomyelitis, right ankle and foot    Personal history of nicotine dependence    Thrombocytopenia, unspecified    Unspecified open wound, right foot, initial encounter    Diabetic foot infection    Subacute osteomyelitis of right foot    Right foot pain    Sepsis    Onychomycosis    Foot pain, left    Impaired mobility and ADLs    Absence of toe of right foot    Corns and callosity    Disability of walking    Fracture    Limb swelling    Polyneuropathy    Pressure ulcer, stage 1    Shortness of breath    Generalized weakness    Debility    Ulcerated, foot,  left, limited to breakdown of skin    Onychomycosis     Past Medical History:   Diagnosis Date    Absence of toe of right foot     Acute osteomyelitis of left calcaneus  08/18/2021    Anxiety and depression     Arthritis     Cancer     Chronic pain     STATES HIS PAIN IS 10/10 AAT    Claustrophobia     Corns and callus     Diabetic ulcer of left heel associated with type 2 DM 08/18/2021    Diabetic ulcer of left heel associated with type 2 DM 07/06/2021    Diabetic ulcer of right midfoot associated with type 2 DM 08/18/2021    Difficulty walking     Essential hypertension 08/31/2021    Hammertoe     Hyperlipidemia LDL goal <100 08/31/2021    Ingrown toenail     Obesity     Paroxysmal atrial fibrillation 08/31/2021    Polyneuropathy     Pressure ulcer, stage 1     Tinea unguium     Type 2 diabetes mellitus with polyneuropathy      Past Surgical History:   Procedure Laterality Date    CYST REMOVAL      center of back; benign    EYE SURGERY      INCISION AND DRAINAGE ABSCESS      back    INCISION AND DRAINAGE LEG Right 12/10/2021    Procedure: INCISION AND DRAINAGE LOWER EXTREMITY;  Surgeon: Ash Leyva DPM;  Location: [facility];  Service: Podiatry;  Laterality: Right;    OTHER SURGICAL HISTORY      Surgical clips left foot    TOE SURGERY Right     Removal of 5th toe    TRANS METATARSAL AMPUTATION Right 12/02/2021    Procedure: AMPUTATION TRANS METATARSAL;  Surgeon: Ash Leyva DPM;  Location: [facility];  Service: Podiatry;  Laterality: Right;    VASCULAR SURGERY      WRIST SURGERY Left     repair of injury       PT Assessment (Last 12 Hours)       PT Evaluation and Treatment       Row Name 05/26/24 1700          Physical Therapy Time and Intention    Subjective Information complains of;pain  -CS     Document Type therapy note (daily note)  -CS     Mode of Treatment individual therapy;physical therapy  -CS     Patient Effort good  -CS     Symptoms Noted During/After Treatment fatigue   -CS       Row Name 05/26/24 1700          Pain    Pretreatment Pain Rating 8/10  -CS     Posttreatment Pain Rating 8/10  -CS     Pain Location - Side/Orientation Left  -CS     Pain Location - hip  -CS       Row Name 05/26/24 1700          Hip (Therapeutic Exercise)    Hip AAROM (Therapeutic Exercise) bilateral;aBduction;aDduction;external rotation;internal rotation;supine;10 repetitions;3 sets  -CS     Hip Isometrics (Therapeutic Exercise) bilateral;gluteal sets;supine;10 repetitions;3 sets;3 second hold  -CS       Row Name 05/26/24 1700          Knee (Therapeutic Exercise)    Knee AAROM (Therapeutic Exercise) bilateral;supine;10 repetitions;3 sets  SLR's  -CS     Knee Isometrics (Therapeutic Exercise) bilateral;quad sets;supine;10 repetitions;3 sets;3 second hold  -CS     Knee Strengthening (Therapeutic Exercise) bilateral;SAQ (short arc quad);supine;4 lb free weight  x 100 reps  -CS       Row Name 05/26/24 1700          Ankle (Therapeutic Exercise)    Ankle AROM (Therapeutic Exercise) bilateral;plantarflexion;dorsiflexion;supine;30 repititions  -CS       Row Name             Wound 03/27/24 1045 Right anterior Skin Tear    Wound - Properties Group Placement Date: 03/27/24  -CR Placement Time: 1045  -CR Side: Right  -CR Orientation: anterior  -CR Primary Wound Type: Skin tear  -CR    Retired Wound - Properties Group Placement Date: 03/27/24  -CR Placement Time: 1045  -CR Side: Right  -CR Orientation: anterior  -CR Primary Wound Type: Skin tear  -CR    Retired Wound - Properties Group Date first assessed: 03/27/24  -CR Time first assessed: 1045  -CR Side: Right  -CR Primary Wound Type: Skin tear  -CR      Row Name             Wound 05/02/24 1033 Left posterior plantar    Wound - Properties Group Placement Date: 05/02/24  -KR Placement Time: 1033  -KR Side: Left  -KR Orientation: posterior  -KR Location: plantar  -KR    Retired Wound - Properties Group Placement Date: 05/02/24  -KR Placement Time: 1033  -KR Side: Left   -KR Orientation: posterior  -KR Location: plantar  -KR    Retired Wound - Properties Group Date first assessed: 05/02/24  -KR Time first assessed: 1033  -KR Side: Left  -KR Location: plantar  -KR      Row Name             [REMOVED] Wound 05/09/24 2010 Right lateral abdomen Abrasion    Wound - Properties Group Placement Date: 05/09/24  -TM Placement Time: 2010 -TM Side: Right  -TM Orientation: lateral  -TM Location: abdomen  -TM Primary Wound Type: Abrasion  -TM Removal Date: 05/26/24  -CR Removal Time: 1442 -CR Wound Outcome: Healed  -CR    Retired Wound - Properties Group Placement Date: 05/09/24  -TM Placement Time: 2010 -TM Side: Right  -TM Orientation: lateral  -TM Location: abdomen  -TM Primary Wound Type: Abrasion  -TM Removal Date: 05/26/24  -CR Removal Time: 1442 -CR Wound Outcome: Healed  -CR    Retired Wound - Properties Group Date first assessed: 05/09/24  -TM Time first assessed: 2010 -TM Side: Right  -TM Location: abdomen  -TM Primary Wound Type: Abrasion  -TM Resolution Date: 05/26/24  -CR Resolution Time: 1442 -CR Wound Outcome: Healed  -CR      Row Name 05/26/24 1700          Positioning and Restraints    Pre-Treatment Position in bed  -CS     Post Treatment Position bed  -CS     In Bed fowlers;call light within reach;encouraged to call for assist  no alarms active upon entering room  -CS       Row Name 05/26/24 1700          Progress Summary (PT)    Progress Toward Functional Goals (PT) progress toward functional goals is fair  -CS               User Key  (r) = Recorded By, (t) = Taken By, (c) = Cosigned By      Initials Name Provider Type    TM Rylee Orellana, RN Registered Nurse    Adilson Carmona LPN Licensed Nurse    Ronald Huerta PTA Physical Therapist Assistant    Almaz Morales RN Registered Nurse                  Section G              Section GG                       Physical Therapy Education       Title: PT OT SLP Therapies (Done)       Topic: Physical Therapy (Done)        Point: Mobility training (Done)       Learning Progress Summary             Patient Acceptance, E,D, DU by PG at 4/24/2024 1039    Acceptance, E,TB, VU by RM at 2/14/2024 0518    Acceptance, E,TB, VU by RM at 1/9/2024 0420    Acceptance, E, VU by CR at 12/30/2023 1750    Acceptance, E, VU by CR at 12/20/2023 1345    Acceptance, E, VU by CR at 12/19/2023 1004    Acceptance, E,TB, VU by RM at 11/30/2023 0420    Acceptance, E,TB, VU by MOE at 11/8/2023 1341                         Point: Precautions (Done)       Learning Progress Summary             Patient Acceptance, E,D, DU by PG at 4/24/2024 1039    Acceptance, E,TB, VU by RM at 2/14/2024 0518    Acceptance, E,TB, VU by RM at 1/9/2024 0420    Acceptance, E, VU by CR at 12/30/2023 1750    Acceptance, E,TB, VU by RM at 11/30/2023 0420    Acceptance, E,TB, VU by MOE at 11/8/2023 1341                                         User Key       Initials Effective Dates Name Provider Type Discipline     06/08/21 -  Bharath Berg, RN Registered Nurse Nurse    CR 06/13/22 -  Adilson Baker LPN Licensed Nurse Nurse    PG 06/16/21 -  Kodak Matos OT Occupational Therapist OT    MOE 06/03/21 -  Merlin Rao PT Physical Therapist PT                  PT Recommendation and Plan     Progress Summary (PT)  Progress Toward Functional Goals (PT): progress toward functional goals is fair  Daily Progress Summary (PT): Decreased assistance for ther-ex's today in comparison to treatment I performed with pt. last weekend.  Pt. still requires assistance but overall, assistance level decreased with range of motion in bilateral lower extremeties.   Outcome Measures       Row Name 05/26/24 1700 05/25/24 1700 05/24/24 1243       How much help from another person do you currently need...    Turning from your back to your side while in flat bed without using bedrails? 3  -CS 3  -CS 3  -VK    Moving from lying on back to sitting on the side of a flat bed without bedrails? 3  -CS 3  -CS 3   -VK    Moving to and from a bed to a chair (including a wheelchair)? 2  -CS 2  -CS 2  -VK    Standing up from a chair using your arms (e.g., wheelchair, bedside chair)? 2  -CS 2  -CS 2  -VK    Climbing 3-5 steps with a railing? 1  -CS 1  -CS 1  -VK    To walk in hospital room? 2  -CS 2  -CS 2  -VK    AM-PAC 6 Clicks Score (PT) 13  -CS 13  -CS 13  -VK    Highest Level of Mobility Goal 4 --> Transfer to chair/commode  -CS 4 --> Transfer to chair/commode  -CS 4 --> Transfer to chair/commode  -VK       Functional Assessment    Outcome Measure Options AM-PAC 6 Clicks Basic Mobility (PT)  -CS AM-PAC 6 Clicks Basic Mobility (PT)  -CS --              User Key  (r) = Recorded By, (t) = Taken By, (c) = Cosigned By      Initials Name Provider Type    CS Ronald Fabian PTA Physical Therapist Assistant    Anya Palumbo PTA Physical Therapist Assistant                     Time Calculation:    PT Charges       Row Name 05/26/24 1702             Time Calculation    Start Time 1245  -CS      PT Received On 05/26/24  -CS         Timed Charges    37829 - PT Therapeutic Exercise Minutes 39  -CS         Total Minutes    Timed Charges Total Minutes 39  -CS       Total Minutes 39  -CS                User Key  (r) = Recorded By, (t) = Taken By, (c) = Cosigned By      Initials Name Provider Type    CS Ronald Fabian PTA Physical Therapist Assistant                  Therapy Charges for Today       Code Description Service Date Service Provider Modifiers Qty    87952778479 HC PT THER PROC EA 15 MIN 5/25/2024 Ronald Fabian PTA GP 3    42737513120 HC PT THER PROC EA 15 MIN 5/26/2024 Ronald Fabian PTA GP 3            PT G-Codes  Outcome Measure Options: AM-PAC 6 Clicks Basic Mobility (PT)  AM-PAC 6 Clicks Score (PT): 13  AM-PAC 6 Clicks Score (OT): 14    Ronald Fabian PTA  5/26/2024

## 2024-05-26 NOTE — PLAN OF CARE
Goal Outcome Evaluation:  Plan of Care Reviewed With: patient        Progress: no change  Outcome Evaluation: Alert and oriented X 4. Vital signs stable. No significant change. Baclofen and Percocet administered X 2 for left hip pain. Tylenol administered for headache. Simethcone administered X 1 for stomach discomfort.  Continue plan of care.

## 2024-05-26 NOTE — PLAN OF CARE
Goal Outcome Evaluation:   Alert and oriented and pleasant with staff. X3 max assist for transfers and ambulation. X3 admin PRN pain medication with relief of symptoms noted. No significant changes noted this shift. Sitting up in bed, call light in reach.

## 2024-05-27 LAB
GLUCOSE BLDC GLUCOMTR-MCNC: 131 MG/DL (ref 70–99)
GLUCOSE BLDC GLUCOMTR-MCNC: 147 MG/DL (ref 70–99)
GLUCOSE BLDC GLUCOMTR-MCNC: 193 MG/DL (ref 70–99)
GLUCOSE BLDC GLUCOMTR-MCNC: 94 MG/DL (ref 70–99)

## 2024-05-27 PROCEDURE — 63710000001 INSULIN GLARGINE PER 5 UNITS: Performed by: INTERNAL MEDICINE

## 2024-05-27 PROCEDURE — 82948 REAGENT STRIP/BLOOD GLUCOSE: CPT

## 2024-05-27 PROCEDURE — 63710000001 ONDANSETRON ODT 4 MG TABLET DISPERSIBLE: Performed by: INTERNAL MEDICINE

## 2024-05-27 PROCEDURE — 82948 REAGENT STRIP/BLOOD GLUCOSE: CPT | Performed by: INTERNAL MEDICINE

## 2024-05-27 PROCEDURE — 63710000001 INSULIN LISPRO (HUMAN) PER 5 UNITS: Performed by: PHYSICIAN ASSISTANT

## 2024-05-27 RX ADMIN — Medication 500 MG: at 10:00

## 2024-05-27 RX ADMIN — ACETAMINOPHEN 650 MG: 325 TABLET ORAL at 06:06

## 2024-05-27 RX ADMIN — Medication 10 MG: at 22:05

## 2024-05-27 RX ADMIN — FERROUS SULFATE TAB 325 MG (65 MG ELEMENTAL FE) 325 MG: 325 (65 FE) TAB at 10:00

## 2024-05-27 RX ADMIN — CYANOCOBALAMIN TAB 500 MCG 1000 MCG: 500 TAB at 10:00

## 2024-05-27 RX ADMIN — INSULIN GLARGINE 35 UNITS: 100 INJECTION, SOLUTION SUBCUTANEOUS at 22:05

## 2024-05-27 RX ADMIN — BACLOFEN 10 MG: 10 TABLET ORAL at 10:04

## 2024-05-27 RX ADMIN — OXYCODONE AND ACETAMINOPHEN 1 TABLET: 10; 325 TABLET ORAL at 10:04

## 2024-05-27 RX ADMIN — MONTELUKAST 10 MG: 10 TABLET, FILM COATED ORAL at 22:05

## 2024-05-27 RX ADMIN — ONDANSETRON 4 MG: 4 TABLET, ORALLY DISINTEGRATING ORAL at 17:15

## 2024-05-27 RX ADMIN — OXYCODONE AND ACETAMINOPHEN 1 TABLET: 10; 325 TABLET ORAL at 19:45

## 2024-05-27 RX ADMIN — DICLOFENAC SODIUM 4 G: 10 GEL TOPICAL at 19:48

## 2024-05-27 RX ADMIN — Medication 500 MG: at 22:05

## 2024-05-27 RX ADMIN — PREGABALIN 100 MG: 100 CAPSULE ORAL at 10:00

## 2024-05-27 RX ADMIN — FUROSEMIDE 60 MG: 40 TABLET ORAL at 10:00

## 2024-05-27 RX ADMIN — PREGABALIN 100 MG: 100 CAPSULE ORAL at 17:00

## 2024-05-27 RX ADMIN — Medication 1 APPLICATION: at 10:02

## 2024-05-27 RX ADMIN — ATORVASTATIN CALCIUM 10 MG: 10 TABLET, FILM COATED ORAL at 22:05

## 2024-05-27 RX ADMIN — INSULIN GLARGINE 35 UNITS: 100 INJECTION, SOLUTION SUBCUTANEOUS at 10:00

## 2024-05-27 RX ADMIN — EMPAGLIFLOZIN 10 MG: 10 TABLET, FILM COATED ORAL at 10:00

## 2024-05-27 RX ADMIN — LISINOPRIL 2.5 MG: 2.5 TABLET ORAL at 10:00

## 2024-05-27 RX ADMIN — APIXABAN 5 MG: 5 TABLET, FILM COATED ORAL at 22:05

## 2024-05-27 RX ADMIN — Medication 1 APPLICATION: at 22:07

## 2024-05-27 RX ADMIN — DICLOFENAC SODIUM 4 G: 10 GEL TOPICAL at 10:00

## 2024-05-27 RX ADMIN — IBUPROFEN 400 MG: 400 TABLET ORAL at 00:34

## 2024-05-27 RX ADMIN — BACLOFEN 10 MG: 10 TABLET ORAL at 19:46

## 2024-05-27 RX ADMIN — PREGABALIN 100 MG: 100 CAPSULE ORAL at 22:05

## 2024-05-27 RX ADMIN — APIXABAN 5 MG: 5 TABLET, FILM COATED ORAL at 10:00

## 2024-05-27 RX ADMIN — SERTRALINE HYDROCHLORIDE 50 MG: 50 TABLET ORAL at 22:05

## 2024-05-27 RX ADMIN — CETIRIZINE HYDROCHLORIDE 10 MG: 10 TABLET, FILM COATED ORAL at 10:00

## 2024-05-27 RX ADMIN — INSULIN LISPRO 3 UNITS: 100 INJECTION, SOLUTION INTRAVENOUS; SUBCUTANEOUS at 18:09

## 2024-05-27 RX ADMIN — DICLOFENAC SODIUM 4 G: 10 GEL TOPICAL at 14:15

## 2024-05-27 NOTE — PLAN OF CARE
Goal Outcome Evaluation:  Plan of Care Reviewed With: patient        Progress: no change    Alert and oriented X 4 with forgetfulness. Vital signs stable. No significant change. Norco, Percocet, and Tylenol administered for left shoulder and hip pain.

## 2024-05-28 LAB
ALBUMIN SERPL-MCNC: 3 G/DL (ref 3.5–5.2)
ANION GAP SERPL CALCULATED.3IONS-SCNC: 7.7 MMOL/L (ref 5–15)
BUN SERPL-MCNC: 13 MG/DL (ref 8–23)
BUN/CREAT SERPL: 22.4 (ref 7–25)
CALCIUM SPEC-SCNC: 8.4 MG/DL (ref 8.6–10.5)
CHLORIDE SERPL-SCNC: 103 MMOL/L (ref 98–107)
CO2 SERPL-SCNC: 25.3 MMOL/L (ref 22–29)
CREAT SERPL-MCNC: 0.58 MG/DL (ref 0.76–1.27)
DEPRECATED RDW RBC AUTO: 48.5 FL (ref 37–54)
EGFRCR SERPLBLD CKD-EPI 2021: 108.2 ML/MIN/1.73
ERYTHROCYTE [DISTWIDTH] IN BLOOD BY AUTOMATED COUNT: 15.4 % (ref 12.3–15.4)
GLUCOSE BLDC GLUCOMTR-MCNC: 129 MG/DL (ref 70–99)
GLUCOSE BLDC GLUCOMTR-MCNC: 169 MG/DL (ref 70–99)
GLUCOSE BLDC GLUCOMTR-MCNC: 192 MG/DL (ref 70–99)
GLUCOSE BLDC GLUCOMTR-MCNC: 89 MG/DL (ref 70–99)
GLUCOSE SERPL-MCNC: 99 MG/DL (ref 65–99)
HCT VFR BLD AUTO: 36 % (ref 37.5–51)
HGB BLD-MCNC: 11.7 G/DL (ref 13–17.7)
MAGNESIUM SERPL-MCNC: 1.9 MG/DL (ref 1.6–2.4)
MCH RBC QN AUTO: 28 PG (ref 26.6–33)
MCHC RBC AUTO-ENTMCNC: 32.5 G/DL (ref 31.5–35.7)
MCV RBC AUTO: 86.1 FL (ref 79–97)
PHOSPHATE SERPL-MCNC: 3.7 MG/DL (ref 2.5–4.5)
PLATELET # BLD AUTO: 98 10*3/MM3 (ref 140–450)
PMV BLD AUTO: 12 FL (ref 6–12)
POTASSIUM SERPL-SCNC: 3.8 MMOL/L (ref 3.5–5.2)
RBC # BLD AUTO: 4.18 10*6/MM3 (ref 4.14–5.8)
SODIUM SERPL-SCNC: 136 MMOL/L (ref 136–145)
WBC NRBC COR # BLD AUTO: 4.84 10*3/MM3 (ref 3.4–10.8)

## 2024-05-28 PROCEDURE — 83735 ASSAY OF MAGNESIUM: CPT | Performed by: PHYSICIAN ASSISTANT

## 2024-05-28 PROCEDURE — 85027 COMPLETE CBC AUTOMATED: CPT | Performed by: PHYSICIAN ASSISTANT

## 2024-05-28 PROCEDURE — 82948 REAGENT STRIP/BLOOD GLUCOSE: CPT

## 2024-05-28 PROCEDURE — 63710000001 INSULIN LISPRO (HUMAN) PER 5 UNITS: Performed by: PHYSICIAN ASSISTANT

## 2024-05-28 PROCEDURE — 63710000001 INSULIN GLARGINE PER 5 UNITS: Performed by: INTERNAL MEDICINE

## 2024-05-28 PROCEDURE — 80069 RENAL FUNCTION PANEL: CPT | Performed by: PHYSICIAN ASSISTANT

## 2024-05-28 PROCEDURE — 82948 REAGENT STRIP/BLOOD GLUCOSE: CPT | Performed by: INTERNAL MEDICINE

## 2024-05-28 RX ADMIN — OXYCODONE AND ACETAMINOPHEN 1 TABLET: 10; 325 TABLET ORAL at 21:26

## 2024-05-28 RX ADMIN — DICLOFENAC SODIUM 4 G: 10 GEL TOPICAL at 08:56

## 2024-05-28 RX ADMIN — Medication 1 APPLICATION: at 11:22

## 2024-05-28 RX ADMIN — APIXABAN 5 MG: 5 TABLET, FILM COATED ORAL at 21:26

## 2024-05-28 RX ADMIN — BACLOFEN 10 MG: 10 TABLET ORAL at 21:25

## 2024-05-28 RX ADMIN — PREGABALIN 100 MG: 100 CAPSULE ORAL at 21:25

## 2024-05-28 RX ADMIN — BACLOFEN 10 MG: 10 TABLET ORAL at 12:11

## 2024-05-28 RX ADMIN — DICLOFENAC SODIUM 4 G: 10 GEL TOPICAL at 21:25

## 2024-05-28 RX ADMIN — Medication 500 MG: at 21:25

## 2024-05-28 RX ADMIN — FERROUS SULFATE TAB 325 MG (65 MG ELEMENTAL FE) 325 MG: 325 (65 FE) TAB at 08:56

## 2024-05-28 RX ADMIN — BACLOFEN 10 MG: 10 TABLET ORAL at 04:06

## 2024-05-28 RX ADMIN — Medication 1 APPLICATION: at 21:32

## 2024-05-28 RX ADMIN — IBUPROFEN 400 MG: 400 TABLET ORAL at 17:00

## 2024-05-28 RX ADMIN — INSULIN GLARGINE 35 UNITS: 100 INJECTION, SOLUTION SUBCUTANEOUS at 21:26

## 2024-05-28 RX ADMIN — DICLOFENAC SODIUM 4 G: 10 GEL TOPICAL at 14:56

## 2024-05-28 RX ADMIN — FUROSEMIDE 60 MG: 40 TABLET ORAL at 08:56

## 2024-05-28 RX ADMIN — CETIRIZINE HYDROCHLORIDE 10 MG: 10 TABLET, FILM COATED ORAL at 08:56

## 2024-05-28 RX ADMIN — INSULIN LISPRO 3 UNITS: 100 INJECTION, SOLUTION INTRAVENOUS; SUBCUTANEOUS at 17:50

## 2024-05-28 RX ADMIN — INSULIN LISPRO 3 UNITS: 100 INJECTION, SOLUTION INTRAVENOUS; SUBCUTANEOUS at 21:26

## 2024-05-28 RX ADMIN — INSULIN GLARGINE 35 UNITS: 100 INJECTION, SOLUTION SUBCUTANEOUS at 08:56

## 2024-05-28 RX ADMIN — APIXABAN 5 MG: 5 TABLET, FILM COATED ORAL at 08:56

## 2024-05-28 RX ADMIN — SERTRALINE HYDROCHLORIDE 50 MG: 50 TABLET ORAL at 21:25

## 2024-05-28 RX ADMIN — ATORVASTATIN CALCIUM 10 MG: 10 TABLET, FILM COATED ORAL at 21:26

## 2024-05-28 RX ADMIN — MONTELUKAST 10 MG: 10 TABLET, FILM COATED ORAL at 21:26

## 2024-05-28 RX ADMIN — LISINOPRIL 2.5 MG: 2.5 TABLET ORAL at 08:56

## 2024-05-28 RX ADMIN — PREGABALIN 100 MG: 100 CAPSULE ORAL at 16:51

## 2024-05-28 RX ADMIN — OXYCODONE AND ACETAMINOPHEN 1 TABLET: 10; 325 TABLET ORAL at 04:06

## 2024-05-28 RX ADMIN — Medication 500 MG: at 08:56

## 2024-05-28 RX ADMIN — PREGABALIN 100 MG: 100 CAPSULE ORAL at 08:56

## 2024-05-28 RX ADMIN — CYANOCOBALAMIN TAB 500 MCG 1000 MCG: 500 TAB at 08:56

## 2024-05-28 RX ADMIN — Medication 10 MG: at 21:25

## 2024-05-28 RX ADMIN — DICLOFENAC SODIUM 4 G: 10 GEL TOPICAL at 02:43

## 2024-05-28 RX ADMIN — OXYCODONE AND ACETAMINOPHEN 1 TABLET: 10; 325 TABLET ORAL at 12:11

## 2024-05-28 RX ADMIN — EMPAGLIFLOZIN 10 MG: 10 TABLET, FILM COATED ORAL at 08:56

## 2024-05-28 NOTE — SIGNIFICANT NOTE
05/28/24 1020   Physical Therapy Time and Intention   Session Not Performed patient/family declined treatment  (Pt declined treatment at 10:20am due to hip pain, and declined at 1:00pm and 1:30pm due to being on bedpan.)

## 2024-05-28 NOTE — PLAN OF CARE
Goal Outcome Evaluation:  Plan of Care Reviewed With: patient        Progress: improving  Outcome Evaluation: resident alert, oriented, able to make needs known to staff. Transfers with assist of 3 to chair, refused to get up today d/t increased BM's. Medicated for prn pain with percocet and baclofen x1, effective. Medicated with prn ibuprofen x1, effective. Blood glucose stable for BF and lunch, elevated at dinner, covered with sliding scale. Resident pleasant and cooperative.

## 2024-05-28 NOTE — SIGNIFICANT NOTE
05/28/24 1340   OTHER   Discipline occupational therapist   Rehab Time/Intention   Session Not Performed patient/family declined treatment  (Approached patient x 3 . Patient unable to participate in OT this date due to feeling dizzy at 10:20 and on bed pan at 1:00 and 1:40p.m.)   Therapy Assessment/Plan (PT)   Criteria for Skilled Interventions Met (PT) skilled treatment is necessary

## 2024-05-28 NOTE — PLAN OF CARE
Goal Outcome Evaluation:  Plan of Care Reviewed With: patient        Progress: no change  Outcome Evaluation: Alert and oriented x4. Given PRN Percocet and Baclofen at 1004 for c/o left hip spasms/pain. Meds effective. Given PRN Zofran ODT at 1715 for intermittent nausea. Med effective per patient but he only ate 25% of his dinner. Voices needs. Con't current POC.

## 2024-05-29 LAB
GLUCOSE BLDC GLUCOMTR-MCNC: 150 MG/DL (ref 70–99)
GLUCOSE BLDC GLUCOMTR-MCNC: 81 MG/DL (ref 70–99)
GLUCOSE BLDC GLUCOMTR-MCNC: 92 MG/DL (ref 70–99)
GLUCOSE BLDC GLUCOMTR-MCNC: 95 MG/DL (ref 70–99)

## 2024-05-29 PROCEDURE — 63710000001 INSULIN GLARGINE PER 5 UNITS: Performed by: INTERNAL MEDICINE

## 2024-05-29 PROCEDURE — 82948 REAGENT STRIP/BLOOD GLUCOSE: CPT | Performed by: INTERNAL MEDICINE

## 2024-05-29 PROCEDURE — 82948 REAGENT STRIP/BLOOD GLUCOSE: CPT

## 2024-05-29 RX ADMIN — EMPAGLIFLOZIN 10 MG: 10 TABLET, FILM COATED ORAL at 09:30

## 2024-05-29 RX ADMIN — PREGABALIN 100 MG: 100 CAPSULE ORAL at 16:33

## 2024-05-29 RX ADMIN — APIXABAN 5 MG: 5 TABLET, FILM COATED ORAL at 20:29

## 2024-05-29 RX ADMIN — BACLOFEN 10 MG: 10 TABLET ORAL at 14:14

## 2024-05-29 RX ADMIN — MONTELUKAST 10 MG: 10 TABLET, FILM COATED ORAL at 20:29

## 2024-05-29 RX ADMIN — Medication 10 MG: at 20:29

## 2024-05-29 RX ADMIN — DICLOFENAC SODIUM 4 G: 10 GEL TOPICAL at 14:14

## 2024-05-29 RX ADMIN — CYANOCOBALAMIN TAB 500 MCG 1000 MCG: 500 TAB at 09:29

## 2024-05-29 RX ADMIN — Medication 500 MG: at 20:29

## 2024-05-29 RX ADMIN — FERROUS SULFATE TAB 325 MG (65 MG ELEMENTAL FE) 325 MG: 325 (65 FE) TAB at 08:30

## 2024-05-29 RX ADMIN — APIXABAN 5 MG: 5 TABLET, FILM COATED ORAL at 09:30

## 2024-05-29 RX ADMIN — DICLOFENAC SODIUM 4 G: 10 GEL TOPICAL at 02:34

## 2024-05-29 RX ADMIN — ATORVASTATIN CALCIUM 10 MG: 10 TABLET, FILM COATED ORAL at 20:29

## 2024-05-29 RX ADMIN — FUROSEMIDE 60 MG: 40 TABLET ORAL at 09:30

## 2024-05-29 RX ADMIN — INSULIN GLARGINE 35 UNITS: 100 INJECTION, SOLUTION SUBCUTANEOUS at 09:29

## 2024-05-29 RX ADMIN — PREGABALIN 100 MG: 100 CAPSULE ORAL at 20:29

## 2024-05-29 RX ADMIN — BACLOFEN 10 MG: 10 TABLET ORAL at 23:06

## 2024-05-29 RX ADMIN — DICLOFENAC SODIUM 4 G: 10 GEL TOPICAL at 08:30

## 2024-05-29 RX ADMIN — OXYCODONE AND ACETAMINOPHEN 1 TABLET: 10; 325 TABLET ORAL at 05:31

## 2024-05-29 RX ADMIN — DICLOFENAC SODIUM 4 G: 10 GEL TOPICAL at 20:29

## 2024-05-29 RX ADMIN — LISINOPRIL 2.5 MG: 2.5 TABLET ORAL at 09:30

## 2024-05-29 RX ADMIN — BACLOFEN 10 MG: 10 TABLET ORAL at 05:31

## 2024-05-29 RX ADMIN — CETIRIZINE HYDROCHLORIDE 10 MG: 10 TABLET, FILM COATED ORAL at 09:30

## 2024-05-29 RX ADMIN — OXYCODONE AND ACETAMINOPHEN 1 TABLET: 10; 325 TABLET ORAL at 23:06

## 2024-05-29 RX ADMIN — INSULIN GLARGINE 35 UNITS: 100 INJECTION, SOLUTION SUBCUTANEOUS at 20:29

## 2024-05-29 RX ADMIN — SERTRALINE HYDROCHLORIDE 50 MG: 50 TABLET ORAL at 20:29

## 2024-05-29 RX ADMIN — Medication 500 MG: at 09:29

## 2024-05-29 RX ADMIN — OXYCODONE AND ACETAMINOPHEN 1 TABLET: 10; 325 TABLET ORAL at 14:14

## 2024-05-29 RX ADMIN — PREGABALIN 100 MG: 100 CAPSULE ORAL at 09:30

## 2024-05-29 NOTE — PLAN OF CARE
Goal Outcome Evaluation:  Plan of Care Reviewed With: patient        Progress: no change       Patient is alert and oriented x 4. Patient is able to make needs known to staff. Patient was informed by Dr. Maria that he would not be receiving his hip surgery as planned. Patient refused to participate in therapy, ADLs, exercises, or weekly weight. Patient also refused his breakfast tray. Will monitor and continue with current plan of care.

## 2024-05-29 NOTE — SIGNIFICANT NOTE
"   05/29/24 0856   OTHER   Discipline physical therapist   Rehab Time/Intention   Session Not Performed patient/family declined, not feeling well  (pt reports he is not going to do any therapy today after receiving \"bad news on my hip\")       "

## 2024-05-29 NOTE — SIGNIFICANT NOTE
"   05/29/24 0800   OTHER   Discipline occupational therapist   Rehab Time/Intention   Session Not Performed patient/family declined, not feeling well  (Pt refusing therapy today after receiving \"bad news\" about his hip.)       "

## 2024-05-29 NOTE — PROGRESS NOTES
[facility]     Progress Note    Patient Name: Jose Shaikh  : 1960  MRN: [id number]  Primary Care Physician:  Delia Cervantes, VALENTÍN  Date of admission: 2023    Subjective   Subjective     HPI:  Patient Reports left hip pain.  Had recent x-rays.  No additional complaints.    Review of Systems   See HPI    Objective   Objective     Vitals:   Temp:  [97.3 °F (36.3 °C)-97.5 °F (36.4 °C)] 97.5 °F (36.4 °C)  Heart Rate:  [77-81] 77  Resp:  [20] 20  BP: (104-109)/(51-53) 104/51  Physical Exam    General: Alert, no acute distress   Chest: Unlabored breathing, cardiovascular: Regular heart rate   Musculoskeletal: Neurovascular intact extremity.  Positive pulses.  Decreased range of motion of hip.    Result Review      WBC   Date Value Ref Range Status   2024 4.84 3.40 - 10.80 10*3/mm3 Final     RBC   Date Value Ref Range Status   2024 4.18 4.14 - 5.80 10*6/mm3 Final     Hemoglobin   Date Value Ref Range Status   2024 11.7 (L) 13.0 - 17.7 g/dL Final     Hematocrit   Date Value Ref Range Status   2024 36.0 (L) 37.5 - 51.0 % Final     MCV   Date Value Ref Range Status   2024 86.1 79.0 - 97.0 fL Final     MCH   Date Value Ref Range Status   2024 28.0 26.6 - 33.0 pg Final     MCHC   Date Value Ref Range Status   2024 32.5 31.5 - 35.7 g/dL Final     RDW   Date Value Ref Range Status   2024 15.4 12.3 - 15.4 % Final     RDW-SD   Date Value Ref Range Status   2024 48.5 37.0 - 54.0 fl Final     MPV   Date Value Ref Range Status   2024 12.0 6.0 - 12.0 fL Final     Platelets   Date Value Ref Range Status   2024 98 (L) 140 - 450 10*3/mm3 Final        Result Review:  I have personally reviewed the results from the time of this admission to 2024 06:48 EDT and agree with these findings:  []  Laboratory  []  Microbiology  [x]  Radiology  []  EKG/Telemetry   []  Cardiology/Vascular   []  Pathology  []  Old records  []  Other: X-rays: Left hip  osteoarthritis      Assessment & Plan   Assessment / Plan     Brief Patient Summary:  Jose Shaikh is a 63 y.o. male who has left hip osteoarthritis    Active Hospital Problems:  Active Hospital Problems    Diagnosis    • **Debility    • Ulcerated, foot, left, limited to breakdown of skin    • Onychomycosis    • Ulcer of right foot      Plan: I reviewed recent x-rays and discussed overall situation with the hospitalist team.  At this point I believe the patient is a poor candidate for hip replacement surgery at [facility].  He is at increased risk for perioperative complication.  May consider further treatment with weight loss medication and reevaluation in 4 to 6 weeks.  May also consider referral to [facility] where Dr. Seymour and Marvin perform complex hip surgery and there is more of a tertiary care facility if significant perioperative complications arise.  Thank you for the consultation.       DVT prophylaxis:  Medical DVT prophylaxis orders are present.        CODE STATUS:   Code Status (Patient has no pulse and is not breathing): CPR (Attempt to Resuscitate)  Medical Interventions (Patient has pulse or is breathing): Full Support    Disposition:  I expect patient to be discharged when medically able.    Electronically signed by Jose Cox MD, 05/29/24, 6:48 AM EDT.

## 2024-05-29 NOTE — PLAN OF CARE
Goal Outcome Evaluation:           Progress: no change  Outcome Evaluation: Rsd. is alert and oriented x4, able to make needs known to staff.  Medicated with prn Oxycodone and baclofen this shift,  See emar.  Rsd. states medications effective.  Activity encouraged, but did not get out of bed.  Trapeze bar overhead, Rsd. able to reposition and weight shift, but refuses to turn.  Rsd. is incontinent of bowel at times, urinal at bedside.  Call light and personal items within reach.  Rsd. reminded to use call light for assistance, verbalizes understanding.  Will continue to monitor and notify on-coming staff.  Current plan of care remains in place at this time.

## 2024-05-30 LAB
GLUCOSE BLDC GLUCOMTR-MCNC: 104 MG/DL (ref 70–99)
GLUCOSE BLDC GLUCOMTR-MCNC: 172 MG/DL (ref 70–99)
GLUCOSE BLDC GLUCOMTR-MCNC: 96 MG/DL (ref 70–99)
GLUCOSE BLDC GLUCOMTR-MCNC: 99 MG/DL (ref 70–99)

## 2024-05-30 PROCEDURE — 63710000001 INSULIN LISPRO (HUMAN) PER 5 UNITS: Performed by: PHYSICIAN ASSISTANT

## 2024-05-30 PROCEDURE — 63710000001 INSULIN GLARGINE PER 5 UNITS: Performed by: INTERNAL MEDICINE

## 2024-05-30 PROCEDURE — 82948 REAGENT STRIP/BLOOD GLUCOSE: CPT | Performed by: INTERNAL MEDICINE

## 2024-05-30 PROCEDURE — 97110 THERAPEUTIC EXERCISES: CPT

## 2024-05-30 PROCEDURE — 63710000001 ONDANSETRON ODT 4 MG TABLET DISPERSIBLE: Performed by: INTERNAL MEDICINE

## 2024-05-30 PROCEDURE — 82948 REAGENT STRIP/BLOOD GLUCOSE: CPT

## 2024-05-30 RX ADMIN — CETIRIZINE HYDROCHLORIDE 10 MG: 10 TABLET, FILM COATED ORAL at 09:27

## 2024-05-30 RX ADMIN — INSULIN LISPRO 3 UNITS: 100 INJECTION, SOLUTION INTRAVENOUS; SUBCUTANEOUS at 20:58

## 2024-05-30 RX ADMIN — ONDANSETRON 4 MG: 4 TABLET, ORALLY DISINTEGRATING ORAL at 08:34

## 2024-05-30 RX ADMIN — FUROSEMIDE 60 MG: 40 TABLET ORAL at 09:27

## 2024-05-30 RX ADMIN — INSULIN GLARGINE 35 UNITS: 100 INJECTION, SOLUTION SUBCUTANEOUS at 20:59

## 2024-05-30 RX ADMIN — IBUPROFEN 400 MG: 400 TABLET ORAL at 09:26

## 2024-05-30 RX ADMIN — CYANOCOBALAMIN TAB 500 MCG 1000 MCG: 500 TAB at 09:27

## 2024-05-30 RX ADMIN — OXYCODONE AND ACETAMINOPHEN 1 TABLET: 10; 325 TABLET ORAL at 16:02

## 2024-05-30 RX ADMIN — Medication 1 APPLICATION: at 09:28

## 2024-05-30 RX ADMIN — PREGABALIN 100 MG: 100 CAPSULE ORAL at 09:27

## 2024-05-30 RX ADMIN — PREGABALIN 100 MG: 100 CAPSULE ORAL at 16:02

## 2024-05-30 RX ADMIN — Medication 1 APPLICATION: at 22:28

## 2024-05-30 RX ADMIN — MONTELUKAST 10 MG: 10 TABLET, FILM COATED ORAL at 20:58

## 2024-05-30 RX ADMIN — DICLOFENAC SODIUM 4 G: 10 GEL TOPICAL at 02:43

## 2024-05-30 RX ADMIN — EMPAGLIFLOZIN 10 MG: 10 TABLET, FILM COATED ORAL at 09:27

## 2024-05-30 RX ADMIN — APIXABAN 5 MG: 5 TABLET, FILM COATED ORAL at 09:27

## 2024-05-30 RX ADMIN — DICLOFENAC SODIUM 4 G: 10 GEL TOPICAL at 13:35

## 2024-05-30 RX ADMIN — SERTRALINE HYDROCHLORIDE 50 MG: 50 TABLET ORAL at 20:58

## 2024-05-30 RX ADMIN — ATORVASTATIN CALCIUM 10 MG: 10 TABLET, FILM COATED ORAL at 20:58

## 2024-05-30 RX ADMIN — DICLOFENAC SODIUM 4 G: 10 GEL TOPICAL at 07:32

## 2024-05-30 RX ADMIN — OXYCODONE AND ACETAMINOPHEN 1 TABLET: 10; 325 TABLET ORAL at 07:31

## 2024-05-30 RX ADMIN — PREGABALIN 100 MG: 100 CAPSULE ORAL at 20:58

## 2024-05-30 RX ADMIN — Medication 500 MG: at 20:58

## 2024-05-30 RX ADMIN — Medication 10 MG: at 20:58

## 2024-05-30 RX ADMIN — ACETAMINOPHEN 650 MG: 325 TABLET ORAL at 20:58

## 2024-05-30 RX ADMIN — DICLOFENAC SODIUM 4 G: 10 GEL TOPICAL at 20:59

## 2024-05-30 RX ADMIN — ACETAMINOPHEN 650 MG: 325 TABLET ORAL at 05:07

## 2024-05-30 RX ADMIN — Medication 500 MG: at 09:27

## 2024-05-30 RX ADMIN — FERROUS SULFATE TAB 325 MG (65 MG ELEMENTAL FE) 325 MG: 325 (65 FE) TAB at 08:34

## 2024-05-30 RX ADMIN — INSULIN GLARGINE 35 UNITS: 100 INJECTION, SOLUTION SUBCUTANEOUS at 09:28

## 2024-05-30 RX ADMIN — BACLOFEN 10 MG: 10 TABLET ORAL at 16:02

## 2024-05-30 RX ADMIN — BACLOFEN 10 MG: 10 TABLET ORAL at 07:31

## 2024-05-30 RX ADMIN — APIXABAN 5 MG: 5 TABLET, FILM COATED ORAL at 20:58

## 2024-05-30 RX ADMIN — LISINOPRIL 2.5 MG: 2.5 TABLET ORAL at 09:27

## 2024-05-30 NOTE — THERAPY TREATMENT NOTE
SNF - Physical Therapy Treatment Note  [facility]ing[facility]     Patient Name: Jose Shaikh  : 1960  MRN: [id number]  Today's Date: 2024      Visit Dx:    ICD-10-CM ICD-9-CM   1. Difficulty in walking  R26.2 719.7   2. Type 2 diabetes mellitus with other specified complication, unspecified whether long term insulin use  E11.69 250.80     Patient Active Problem List   Diagnosis    Diabetic ulcer of left heel associated with type 2 DM    Acute osteomyelitis of left calcaneus     Diabetic ulcer of left heel associated with type 2 DM    Diabetic ulcer of right midfoot associated with type 2 DM    Paroxysmal atrial fibrillation    Essential hypertension    Hyperlipidemia LDL goal <100    Cellulitis and abscess of foot    High alkaline phosphatase level    Osteomyelitis    Onychomycosis    Onychocryptosis    Foot pain, bilateral    Osteomyelitis of foot, right, acute    Cellulitis of right foot    Type 2 diabetes mellitus, with long-term current use of insulin    Class 3 severe obesity due to excess calories with serious comorbidity and body mass index (BMI) of 45.0 to 49.9 in adult    Anxiety disorder, unspecified    Claustrophobia    Dependence on wheelchair    Depression, unspecified    Long term (current) use of anticoagulants    Long term (current) use of oral hypoglycemic drugs    Wound of foot    Ulcer of right foot    Orthostatic hypotension    Other chronic osteomyelitis, right ankle and foot    Personal history of nicotine dependence    Thrombocytopenia, unspecified    Unspecified open wound, right foot, initial encounter    Diabetic foot infection    Subacute osteomyelitis of right foot    Right foot pain    Sepsis    Onychomycosis    Foot pain, left    Impaired mobility and ADLs    Absence of toe of right foot    Corns and callosity    Disability of walking    Fracture    Limb swelling    Polyneuropathy    Pressure ulcer, stage 1    Shortness of breath    Generalized weakness    Debility    Ulcerated, foot,  left, limited to breakdown of skin    Onychomycosis     Past Medical History:   Diagnosis Date    Absence of toe of right foot     Acute osteomyelitis of left calcaneus  08/18/2021    Anxiety and depression     Arthritis     Cancer     Chronic pain     STATES HIS PAIN IS 10/10 AAT    Claustrophobia     Corns and callus     Diabetic ulcer of left heel associated with type 2 DM 08/18/2021    Diabetic ulcer of left heel associated with type 2 DM 07/06/2021    Diabetic ulcer of right midfoot associated with type 2 DM 08/18/2021    Difficulty walking     Essential hypertension 08/31/2021    Hammertoe     Hyperlipidemia LDL goal <100 08/31/2021    Ingrown toenail     Obesity     Paroxysmal atrial fibrillation 08/31/2021    Polyneuropathy     Pressure ulcer, stage 1     Tinea unguium     Type 2 diabetes mellitus with polyneuropathy      Past Surgical History:   Procedure Laterality Date    CYST REMOVAL      center of back; benign    EYE SURGERY      INCISION AND DRAINAGE ABSCESS      back    INCISION AND DRAINAGE LEG Right 12/10/2021    Procedure: INCISION AND DRAINAGE LOWER EXTREMITY;  Surgeon: Ash Leyva DPM;  Location: [facility];  Service: Podiatry;  Laterality: Right;    OTHER SURGICAL HISTORY      Surgical clips left foot    TOE SURGERY Right     Removal of 5th toe    TRANS METATARSAL AMPUTATION Right 12/02/2021    Procedure: AMPUTATION TRANS METATARSAL;  Surgeon: Ash Leyva DPM;  Location: [facility];  Service: Podiatry;  Laterality: Right;    VASCULAR SURGERY      WRIST SURGERY Left     repair of injury       PT Assessment (Last 12 Hours)       PT Evaluation and Treatment       Row Name 05/30/24 1422          Physical Therapy Time and Intention    Document Type therapy note (daily note)  -WM     Mode of Treatment individual therapy;physical therapy  -WM     Patient Effort good  -WM     Symptoms Noted During/After Treatment fatigue  -WM       Row Name 05/30/24 1422          Hip  (Therapeutic Exercise)    Hip AAROM (Therapeutic Exercise) bilateral;aBduction;aDduction;external rotation;internal rotation;supine;10 repetitions;5 repetitions;2 sets  -     Hip Isometrics (Therapeutic Exercise) bilateral;gluteal sets;supine;10 repetitions;5 repetitions;3 second hold;2 sets  -     Hip Strengthening (Therapeutic Exercise) bilateral;heel slides;supine;30 repititions  Manual resistance  -       Row Name 05/30/24 1422          Knee (Therapeutic Exercise)    Knee Isometrics (Therapeutic Exercise) bilateral;quad sets;supine;10 repetitions;5 repetitions;3 second hold;2 sets  -     Knee Strengthening (Therapeutic Exercise) bilateral;SAQ (short arc quad);supine;4 lb free weight;30 repititions  Active-assisted SLR  -       Row Name 05/30/24 1422          Ankle (Therapeutic Exercise)    Ankle AROM (Therapeutic Exercise) bilateral;dorsiflexion;plantarflexion;supine;30 repititions  -       Row Name             Wound 03/27/24 1045 Right anterior Skin Tear    Wound - Properties Group Placement Date: 03/27/24  -CR Placement Time: 1045  -CR Side: Right  -CR Orientation: anterior  -CR Primary Wound Type: Skin tear  -CR    Retired Wound - Properties Group Placement Date: 03/27/24  -CR Placement Time: 1045  -CR Side: Right  -CR Orientation: anterior  -CR Primary Wound Type: Skin tear  -CR    Retired Wound - Properties Group Date first assessed: 03/27/24  -CR Time first assessed: 1045  -CR Side: Right  -CR Primary Wound Type: Skin tear  -CR      Row Name             Wound 05/02/24 1033 Left posterior plantar    Wound - Properties Group Placement Date: 05/02/24  -KR Placement Time: 1033  -KR Side: Left  -KR Orientation: posterior  -KR Location: plantar  -KR    Retired Wound - Properties Group Placement Date: 05/02/24  -KR Placement Time: 1033  -KR Side: Left  -KR Orientation: posterior  -KR Location: plantar  -KR    Retired Wound - Properties Group Date first assessed: 05/02/24  -KR Time first assessed: 1033   -KR Side: Left  -KR Location: plantar  -KR      Row Name 05/30/24 1422          Positioning and Restraints    Pre-Treatment Position in bed  -WM     Post Treatment Position bed  -WM     In Bed fowlers;call light within reach;encouraged to call for assist  -WM       Row Name 05/30/24 1422          Progress Summary (PT)    Progress Toward Functional Goals (PT) progress toward functional goals is gradual  -WM     Daily Progress Summary (PT) Pt participated in BLE exercises in supine. He declined transfers and gait training stating he will attempt tomorrow.  -WM               User Key  (r) = Recorded By, (t) = Taken By, (c) = Cosigned By      Initials Name Provider Type    Adilson Carmona LPN Licensed Nurse    Carson Johnson PTA Physical Therapist Assistant    Almaz Morales, RN Registered Nurse                  Section G              Section GG                       Physical Therapy Education       Title: PT OT SLP Therapies (Done)       Topic: Physical Therapy (Done)       Point: Mobility training (Done)       Learning Progress Summary             Patient Acceptance, E,TB, VU by RM at 5/28/2024 0439    Acceptance, E,D, DU by PG at 4/24/2024 1039    Acceptance, E,TB, VU by RM at 2/14/2024 0518    Acceptance, E,TB, VU by RM at 1/9/2024 0420    Acceptance, E, VU by CR at 12/30/2023 1750    Acceptance, E, VU by CR at 12/20/2023 1345    Acceptance, E, VU by CR at 12/19/2023 1004    Acceptance, E,TB, VU by RM at 11/30/2023 0420    Acceptance, E,TB, VU by MOE at 11/8/2023 1341                         Point: Precautions (Done)       Learning Progress Summary             Patient Acceptance, E,TB, VU by RM at 5/28/2024 0439    Acceptance, E,D, DU by PG at 4/24/2024 1039    Acceptance, E,TB, VU by RM at 2/14/2024 0518    Acceptance, E,TB, VU by RM at 1/9/2024 0420    Acceptance, E, VU by CR at 12/30/2023 1750    Acceptance, E,TB, VU by RM at 11/30/2023 0420    Acceptance, E,TB, VU by MOE at 11/8/2023 1341                                          User Key       Initials Effective Dates Name Provider Type Discipline    RM 06/08/21 -  Bharath Berg RN Registered Nurse Nurse    CR 06/13/22 -  Adilson Baker LPN Licensed Nurse Nurse    PG 06/16/21 -  Kodak Matos OT Occupational Therapist OT    MOE 06/03/21 -  Merlin Rao PT Physical Therapist PT                  PT Recommendation and Plan     Progress Summary (PT)  Progress Toward Functional Goals (PT): progress toward functional goals is gradual  Daily Progress Summary (PT): Pt participated in BLE exercises in supine. He declined transfers and gait training stating he will attempt tomorrow.   Outcome Measures       Row Name 05/30/24 1428             How much help from another person do you currently need...    Turning from your back to your side while in flat bed without using bedrails? 4  -WM      Moving from lying on back to sitting on the side of a flat bed without bedrails? 3  -WM      Moving to and from a bed to a chair (including a wheelchair)? 2  -WM      Standing up from a chair using your arms (e.g., wheelchair, bedside chair)? 2  -WM      Climbing 3-5 steps with a railing? 1  -WM      To walk in hospital room? 2  -WM      AM-PAC 6 Clicks Score (PT) 14  -WM      Highest Level of Mobility Goal 4 --> Transfer to chair/commode  -WM                User Key  (r) = Recorded By, (t) = Taken By, (c) = Cosigned By      Initials Name Provider Type    WM Carson Inman PTA Physical Therapist Assistant                     Time Calculation:    PT Charges       Row Name 05/30/24 1421             Time Calculation    PT Received On 05/30/24  -WM         Timed Charges    97127 - PT Therapeutic Exercise Minutes 24  -WM         SNF Physical Therapy Minutes    Skilled Minutes- PT 24 min  -WM         Total Minutes    Timed Charges Total Minutes 24  -WM       Total Minutes 24  -WM                User Key  (r) = Recorded By, (t) = Taken By, (c) = Cosigned By      Initials Name  Provider Type     Carson Inman PTA Physical Therapist Assistant                      PT G-Codes  Outcome Measure Options: AM-PAC 6 Clicks Basic Mobility (PT)  AM-PAC 6 Clicks Score (PT): 14  AM-PAC 6 Clicks Score (OT): 14    Carson Inman PTA  5/30/2024

## 2024-05-30 NOTE — PLAN OF CARE
Goal Outcome Evaluation:  Plan of Care Reviewed With: patient        Progress: no change  Outcome Evaluation: Alert and oriented. More withdrawn this morning. Given PRN Percocet and baclofen at 0731 and again at 1602 for c/o left hip pain/spasms. Meds effective each occurance. Given PRN Zofran ODT at 0834 for c/o nausea without emesis. Med effective. Given PRN Ibuprofen at  0926 for c/o left shoulder pain. Med effective. Voices needs. Con't to refuse turns, oral care, and incentive spirometer. Allowed nurse to cleanse BLE with bathwipes prior to applying scheuduled [place], but refused actual bath. Con't current POC.

## 2024-05-30 NOTE — PLAN OF CARE
Goal Outcome Evaluation:           Progress: no change    Outcome Evaluation: Rsd. is alert and oriented x4, able to make needs known to staff.  Medicated with prn Oxycodone, baclofeb, and tylenol this shift, See emar.  Rsd. states medications effective.  Activity encouraged, but did not get out of bed.  Trapeze bar overhead, Rsd. about to reposition and weight shift, but refuses to turn.  Rsd. is incontinent of bowel at times, urinal at bedside.  Call light and personal items within reach.  Rsd. reminded to use call light for assistance, verbalizes understanding.  Will continue to monitor and notify on-coming staff.  Current plan of care remains in place at this time.

## 2024-05-31 LAB
GLUCOSE BLDC GLUCOMTR-MCNC: 106 MG/DL (ref 70–99)
GLUCOSE BLDC GLUCOMTR-MCNC: 117 MG/DL (ref 70–99)
GLUCOSE BLDC GLUCOMTR-MCNC: 127 MG/DL (ref 70–99)
GLUCOSE BLDC GLUCOMTR-MCNC: 94 MG/DL (ref 70–99)

## 2024-05-31 PROCEDURE — 63710000001 ONDANSETRON ODT 4 MG TABLET DISPERSIBLE: Performed by: INTERNAL MEDICINE

## 2024-05-31 PROCEDURE — 63710000001 INSULIN GLARGINE PER 5 UNITS: Performed by: INTERNAL MEDICINE

## 2024-05-31 PROCEDURE — 82948 REAGENT STRIP/BLOOD GLUCOSE: CPT

## 2024-05-31 PROCEDURE — 97530 THERAPEUTIC ACTIVITIES: CPT

## 2024-05-31 PROCEDURE — 82948 REAGENT STRIP/BLOOD GLUCOSE: CPT | Performed by: INTERNAL MEDICINE

## 2024-05-31 RX ADMIN — CETIRIZINE HYDROCHLORIDE 10 MG: 10 TABLET, FILM COATED ORAL at 08:32

## 2024-05-31 RX ADMIN — OXYCODONE AND ACETAMINOPHEN 1 TABLET: 10; 325 TABLET ORAL at 02:50

## 2024-05-31 RX ADMIN — BACLOFEN 10 MG: 10 TABLET ORAL at 21:33

## 2024-05-31 RX ADMIN — LISINOPRIL 2.5 MG: 2.5 TABLET ORAL at 08:32

## 2024-05-31 RX ADMIN — Medication 500 MG: at 20:46

## 2024-05-31 RX ADMIN — PREGABALIN 100 MG: 100 CAPSULE ORAL at 20:45

## 2024-05-31 RX ADMIN — OXYCODONE AND ACETAMINOPHEN 1 TABLET: 10; 325 TABLET ORAL at 13:53

## 2024-05-31 RX ADMIN — Medication 10 MG: at 20:45

## 2024-05-31 RX ADMIN — ONDANSETRON 4 MG: 4 TABLET, ORALLY DISINTEGRATING ORAL at 08:31

## 2024-05-31 RX ADMIN — CYANOCOBALAMIN TAB 500 MCG 1000 MCG: 500 TAB at 08:33

## 2024-05-31 RX ADMIN — BACLOFEN 10 MG: 10 TABLET ORAL at 13:52

## 2024-05-31 RX ADMIN — INSULIN GLARGINE 35 UNITS: 100 INJECTION, SOLUTION SUBCUTANEOUS at 20:45

## 2024-05-31 RX ADMIN — FERROUS SULFATE TAB 325 MG (65 MG ELEMENTAL FE) 325 MG: 325 (65 FE) TAB at 08:33

## 2024-05-31 RX ADMIN — APIXABAN 5 MG: 5 TABLET, FILM COATED ORAL at 08:33

## 2024-05-31 RX ADMIN — DICLOFENAC SODIUM 4 G: 10 GEL TOPICAL at 02:50

## 2024-05-31 RX ADMIN — FUROSEMIDE 60 MG: 40 TABLET ORAL at 08:33

## 2024-05-31 RX ADMIN — DICLOFENAC SODIUM 4 G: 10 GEL TOPICAL at 08:36

## 2024-05-31 RX ADMIN — OXYCODONE AND ACETAMINOPHEN 1 TABLET: 10; 325 TABLET ORAL at 21:33

## 2024-05-31 RX ADMIN — Medication 500 MG: at 08:33

## 2024-05-31 RX ADMIN — EMPAGLIFLOZIN 10 MG: 10 TABLET, FILM COATED ORAL at 08:33

## 2024-05-31 RX ADMIN — INSULIN GLARGINE 35 UNITS: 100 INJECTION, SOLUTION SUBCUTANEOUS at 08:32

## 2024-05-31 RX ADMIN — APIXABAN 5 MG: 5 TABLET, FILM COATED ORAL at 20:46

## 2024-05-31 RX ADMIN — MONTELUKAST 10 MG: 10 TABLET, FILM COATED ORAL at 20:45

## 2024-05-31 RX ADMIN — PREGABALIN 100 MG: 100 CAPSULE ORAL at 08:33

## 2024-05-31 RX ADMIN — ATORVASTATIN CALCIUM 10 MG: 10 TABLET, FILM COATED ORAL at 20:46

## 2024-05-31 RX ADMIN — IBUPROFEN 400 MG: 400 TABLET ORAL at 00:51

## 2024-05-31 RX ADMIN — DICLOFENAC SODIUM 4 G: 10 GEL TOPICAL at 20:48

## 2024-05-31 RX ADMIN — Medication 1 APPLICATION: at 21:33

## 2024-05-31 RX ADMIN — BACLOFEN 10 MG: 10 TABLET ORAL at 02:50

## 2024-05-31 RX ADMIN — SERTRALINE HYDROCHLORIDE 50 MG: 50 TABLET ORAL at 20:45

## 2024-05-31 RX ADMIN — PREGABALIN 100 MG: 100 CAPSULE ORAL at 16:07

## 2024-05-31 NOTE — PLAN OF CARE
Goal Outcome Evaluation:    AAO X4. TOLERATING DIET (REFUSING SOME TRAYS BUT TOLERATING THE FOOD BROUGHT IN THAT WAS NOT HIS DIETARY TRAY) & ROOM AIR. DECLINES TO INCREASE ACTIVITY BUT DID STAND WITH ASSIST X2 TO OBTAIN A STANDING WEIGHT.    REFUSING WOUND CARE & WOUND CARE ASSESSMENT.  REFUSING TURNS BUT STATES HE CAN TURN HIMSELF WHEN HE WANTS WITH HIS TRAPEZE BAR.    INCONTINENT OF URINE THIS SHIFT (STATES HE KEEPS SPILLING HIS URINAL) & WOULD ONLY ALLOW 1 PARTICULAR FEMALE PCA TO CHANGE HIM ALL SHIFT.

## 2024-05-31 NOTE — THERAPY TREATMENT NOTE
SNF - Occupational Therapy Treatment Note  KEO Dominguez    Patient Name: Jose Shaikh  : 1960    MRN: [id number]                              Today's Date: 2024       Admit Date: 2023    Visit Dx:     ICD-10-CM ICD-9-CM   1. Difficulty in walking  R26.2 719.7   2. Type 2 diabetes mellitus with other specified complication, unspecified whether long term insulin use  E11.69 250.80     Patient Active Problem List   Diagnosis    Diabetic ulcer of left heel associated with type 2 DM    Acute osteomyelitis of left calcaneus     Diabetic ulcer of left heel associated with type 2 DM    Diabetic ulcer of right midfoot associated with type 2 DM    Paroxysmal atrial fibrillation    Essential hypertension    Hyperlipidemia LDL goal <100    Cellulitis and abscess of foot    High alkaline phosphatase level    Osteomyelitis    Onychomycosis    Onychocryptosis    Foot pain, bilateral    Osteomyelitis of foot, right, acute    Cellulitis of right foot    Type 2 diabetes mellitus, with long-term current use of insulin    Class 3 severe obesity due to excess calories with serious comorbidity and body mass index (BMI) of 45.0 to 49.9 in adult    Anxiety disorder, unspecified    Claustrophobia    Dependence on wheelchair    Depression, unspecified    Long term (current) use of anticoagulants    Long term (current) use of oral hypoglycemic drugs    Wound of foot    Ulcer of right foot    Orthostatic hypotension    Other chronic osteomyelitis, right ankle and foot    Personal history of nicotine dependence    Thrombocytopenia, unspecified    Unspecified open wound, right foot, initial encounter    Diabetic foot infection    Subacute osteomyelitis of right foot    Right foot pain    Sepsis    Onychomycosis    Foot pain, left    Impaired mobility and ADLs    Absence of toe of right foot    Corns and callosity    Disability of walking    Fracture    Limb swelling    Polyneuropathy    Pressure ulcer, stage 1    Shortness of  breath    Generalized weakness    Debility    Ulcerated, foot, left, limited to breakdown of skin    Onychomycosis     Past Medical History:   Diagnosis Date    Absence of toe of right foot     Acute osteomyelitis of left calcaneus  08/18/2021    Anxiety and depression     Arthritis     Cancer     Chronic pain     STATES HIS PAIN IS 10/10 AAT    Claustrophobia     Corns and callus     Diabetic ulcer of left heel associated with type 2 DM 08/18/2021    Diabetic ulcer of left heel associated with type 2 DM 07/06/2021    Diabetic ulcer of right midfoot associated with type 2 DM 08/18/2021    Difficulty walking     Essential hypertension 08/31/2021    Hammertoe     Hyperlipidemia LDL goal <100 08/31/2021    Ingrown toenail     Obesity     Paroxysmal atrial fibrillation 08/31/2021    Polyneuropathy     Pressure ulcer, stage 1     Tinea unguium     Type 2 diabetes mellitus with polyneuropathy      Past Surgical History:   Procedure Laterality Date    CYST REMOVAL      center of back; benign    EYE SURGERY      INCISION AND DRAINAGE ABSCESS      back    INCISION AND DRAINAGE LEG Right 12/10/2021    Procedure: INCISION AND DRAINAGE LOWER EXTREMITY;  Surgeon: Ash Leyva DPM;  Location: [facility] OR;  Service: Podiatry;  Laterality: Right;    OTHER SURGICAL HISTORY      Surgical clips left foot    TOE SURGERY Right     Removal of 5th toe    TRANS METATARSAL AMPUTATION Right 12/02/2021    Procedure: AMPUTATION TRANS METATARSAL;  Surgeon: Ash Leyva DPM;  Location: [facility] OR;  Service: Podiatry;  Laterality: Right;    VASCULAR SURGERY      WRIST SURGERY Left     repair of injury      General Information       Row Name 05/31/24 2955          OT Time and Intention    Document Type therapy note (daily note)  -LF     Mode of Treatment individual therapy;occupational therapy  -LF               User Key  (r) = Recorded By, (t) = Taken By, (c) = Cosigned By      Initials Name Provider Type    LF  Michelle Howard, OT Occupational Therapist                     Mobility/ADL's       Row Name 05/31/24 1541          Bed Mobility    Bed Mobility supine-sit;sit-supine  -LF     Supine-Sit [place] (Bed Mobility) minimum assist (75% patient effort);moderate assist (50% patient effort);1 person assist;verbal cues  -LF     Sit-Supine [place] (Bed Mobility) minimum assist (75% patient effort);2 person assist;verbal cues  -LF     Bed Mobility, Safety Issues decreased use of legs for bridging/pushing;decreased use of arms for pushing/pulling  -     Assistive Device (Bed Mobility) bed rails;draw sheet;head of bed elevated  -       Row Name 05/31/24 1541          Transfers    Transfers sit-stand transfer;stand-sit transfer  -     Comment, (Transfers) Pt agreeable for one sit to stand to obtain his weight on the scale but adamantly declined further d/t pain and diarrhea.  -       Row Name 05/31/24 1541          Sit-Stand Transfer    Sit-Stand [place] (Transfers) moderate assist (50% patient effort);2 person assist;verbal cues  -     Assistive Device (Sit-Stand Transfers) other (see comments)  scale grab bar  -       Row Name 05/31/24 1541          Stand-Sit Transfer    Stand-Sit [place] (Transfers) moderate assist (50% patient effort);2 person assist;verbal cues  -     Assistive Device (Stand-Sit Transfers) other (see comments)  scale grab bar  -       Row Name 05/31/24 1541          Activities of Daily Living    BADL Assessment/Intervention toileting  -       Row Name 05/31/24 1541          Mobility    Extremity Weight-bearing Status left lower extremity;right lower extremity  -     Left Lower Extremity (Weight-bearing Status) weight-bearing as tolerated (WBAT)  -     Right Lower Extremity (Weight-bearing Status) weight-bearing as tolerated (WBAT)  -       Row Name 05/31/24 1541          Toileting Assessment/Training    [place] Level (Toileting) toileting skills;perform  perineal hygiene;dependent (less than 25% patient effort)  -     Position (Toileting) supine  -               User Key  (r) = Recorded By, (t) = Taken By, (c) = Cosigned By      Initials Name Provider Type     Michelle Howard OT Occupational Therapist                   Obj/Interventions       Row Name 05/31/24 1544          Motor Skills    Motor Skills functional endurance  -     Functional Endurance Fair+  -       Row Name 05/31/24 1544          Balance    Balance Assessment sitting dynamic balance;standing dynamic balance  -     Dynamic Sitting Balance standby assist  -LF     Position, Sitting Balance supported;sitting edge of bed  -LF     Dynamic Standing Balance moderate assist  -LF     Position/Device Used, Standing Balance supported;other (see comments)  scale grab bar  -     Balance Interventions sitting;standing;sit to stand;supported;dynamic;occupation based/functional task;weight shifting activity  -               User Key  (r) = Recorded By, (t) = Taken By, (c) = Cosigned By      Initials Name Provider Type     Michelle Howard OT Occupational Therapist                   Goals/Plan    No documentation.                  Clinical Impression       Row Name 05/31/24 1547          Pain Assessment    Pain Intervention(s) Nursing Notified  -     Additional Documentation Pain Scale: FACES Pre/Post-Treatment (Group)  -       Row Name 05/31/24 1547          Pain Scale: FACES Pre/Post-Treatment    Pain: FACES Scale, Pretreatment 2-->hurts little bit  -     Posttreatment Pain Rating 8-->hurts whole lot  -       Row Name 05/31/24 1547          Plan of Care Review    Plan of Care Reviewed With patient  -     Progress no change  -     Outcome Evaluation No significant change this session. Pt declined first 2 attempts d/t diarrhea but was agreeable to functional transfer training initially to obtain his weight via standing scale. He declined further attempts d/t pain.  -       Row Name  05/31/24 1547          Vital Signs    O2 Delivery Pre Treatment room air  -LF     O2 Delivery Intra Treatment room air  -LF     O2 Delivery Post Treatment room air  -LF       Row Name 05/31/24 1547          Positioning and Restraints    Pre-Treatment Position in bed  -LF     Post Treatment Position bed  -LF     In Bed fowlers;call light within reach;encouraged to call for assist;with nsg  -LF               User Key  (r) = Recorded By, (t) = Taken By, (c) = Cosigned By      Initials Name Provider Type    LF Michelle Howard OT Occupational Therapist                   Outcome Measures       Row Name 05/31/24 0835 05/31/24 0400       How much help from another person do you currently need...    Turning from your back to your side while in flat bed without using bedrails? 4  -TB 4  -FM    Moving from lying on back to sitting on the side of a flat bed without bedrails? 3  -TB 3  -FM    Moving to and from a bed to a chair (including a wheelchair)? 3  -TB 2  -FM    Standing up from a chair using your arms (e.g., wheelchair, bedside chair)? 3  -TB 2  -FM    Climbing 3-5 steps with a railing? 1  -TB 1  -FM    To walk in hospital room? 2  -TB 2  -FM    AM-PAC 6 Clicks Score (PT) 16  -TB 14  -FM    Highest Level of Mobility Goal 5 --> Static standing  -TB 4 --> Transfer to chair/commode  -FM              User Key  (r) = Recorded By, (t) = Taken By, (c) = Cosigned By      Initials Name Provider Type    FM Patsy Cano, RN Registered Nurse    Delfina Carolina RN Registered Nurse                  Section G              Section GG                         Occupational Therapy Education       Title: PT OT SLP Therapies (Done)       Topic: Occupational Therapy (Done)       Point: ADL training (Done)       Description:   Instruct learner(s) on proper safety adaptation and remediation techniques during self care or transfers.   Instruct in proper use of assistive devices.                  Learning Progress Summary              Patient Acceptance, E,TB, VU by RM at 5/28/2024 0439    Acceptance, E,D, DU by PG at 4/24/2024 1039    Acceptance, E,TB, VU by RM at 2/14/2024 0518    Acceptance, E,TB, VU by RM at 1/9/2024 0420    Acceptance, E, VU by CR at 12/30/2023 1750    Acceptance, E,TB, VU by RM at 11/30/2023 0420    Acceptance, E,D, DU by PG at 11/7/2023 0932                         Point: Home exercise program (Done)       Description:   Instruct learner(s) on appropriate technique for monitoring, assisting and/or progressing therapeutic exercises/activities.                  Learning Progress Summary             Patient Acceptance, E,TB, VU by RM at 5/28/2024 0439    Acceptance, E,D, DU by PG at 4/24/2024 1039    Acceptance, E,TB, VU by RM at 2/14/2024 0518    Acceptance, E,TB, VU by RM at 1/9/2024 0420    Acceptance, E, VU by CR at 12/30/2023 1750    Acceptance, E,TB, VU by RM at 11/30/2023 0420    Acceptance, E,D, DU by PG at 11/7/2023 0932                         Point: Precautions (Done)       Description:   Instruct learner(s) on prescribed precautions during self-care and functional transfers.                  Learning Progress Summary             Patient Acceptance, E,TB, VU by RM at 5/28/2024 0439    Acceptance, E,D, DU by PG at 4/24/2024 1039    Acceptance, E,TB, VU by RM at 2/14/2024 0518    Acceptance, E,TB, VU by RM at 1/9/2024 0420    Acceptance, E, VU by CR at 12/30/2023 1750    Acceptance, E,TB, VU by RM at 11/30/2023 0420    Acceptance, E,D, DU by PG at 11/7/2023 0932                         Point: Body mechanics (Done)       Description:   Instruct learner(s) on proper positioning and spine alignment during self-care, functional mobility activities and/or exercises.                  Learning Progress Summary             Patient Acceptance, E,TB, VU by RM at 5/28/2024 0439    Acceptance, E,D, DU by PG at 4/24/2024 1039    Acceptance, E,TB, VU by RM at 2/14/2024 0518    Acceptance, E,TB, VU by RM at 1/9/2024 0420     Acceptance, E, VU by  at 12/30/2023 1750    Acceptance, E,TB, VU by  at 11/30/2023 0420    Acceptance, E,D, DU by PG at 11/7/2023 0932                                         User Key       Initials Effective Dates Name Provider Type Discipline     06/08/21 -  Bharath Berg, RN Registered Nurse Nurse    CR 06/13/22 -  Adilson Baker LPN Licensed Nurse Nurse    PG 06/16/21 -  [place] OT Occupational Therapist OT                  OT Recommendation and Plan  Planned Therapy Interventions (OT): activity tolerance training, BADL retraining, functional balance retraining, occupation/activity based interventions, patient/caregiver education/training, strengthening exercise, transfer/mobility retraining  Therapy Frequency (OT): 5 times/wk  Plan of Care Review  Plan of Care Reviewed With: patient  Progress: no change  Outcome Evaluation: No significant change this session. Pt declined first 2 attempts d/t diarrhea but was agreeable to functional transfer training initially to obtain his weight via standing scale. He declined further attempts d/t pain.     Time Calculation:         Time Calculation- OT       Row Name 05/31/24 1552             Time Calculation- OT    OT Received On 05/31/24  -LF      OT Goal Re-Cert Due Date 06/01/24  -LF         Timed Charges    46918 - OT Therapeutic Activity Minutes 10  -LF      01004 - OT Self Care/Mgmt Minutes 5  -LF         Total Minutes    Timed Charges Total Minutes 15  -LF       Total Minutes 15  -LF                User Key  (r) = Recorded By, (t) = Taken By, (c) = Cosigned By      Initials Name Provider Type    LF Michelle Howard OT Occupational Therapist                  Therapy Charges for Today       Code Description Service Date Service Provider Modifiers Qty    [id number]  OT THERAPEUTIC ACT EA 15 MIN 5/31/2024 Michelle Howard OT GO 1                 Michelle Howard OT  5/31/2024

## 2024-05-31 NOTE — CONSULTS
"Nutrition Services    Patient Name: Jose Shaikh  YOB: 1960  MRN: [id number]  Admission date: 11/6/2023      CLINICAL NUTRITION ASSESSMENT      Reason for Assessment  Follow Up   H&P:  Past Medical History:   Diagnosis Date    Absence of toe of right foot     Acute osteomyelitis of left calcaneus  08/18/2021    Anxiety and depression     Arthritis     Cancer     Chronic pain     STATES HIS PAIN IS 10/10 AAT    Claustrophobia     Corns and callus     Diabetic ulcer of left heel associated with type 2 DM 08/18/2021    Diabetic ulcer of left heel associated with type 2 DM 07/06/2021    Diabetic ulcer of right midfoot associated with type 2 DM 08/18/2021    Difficulty walking     Essential hypertension 08/31/2021    Hammertoe     Hyperlipidemia LDL goal <100 08/31/2021    Ingrown toenail     Obesity     Paroxysmal atrial fibrillation 08/31/2021    Polyneuropathy     Pressure ulcer, stage 1     Tinea unguium     Type 2 diabetes mellitus with polyneuropathy         Current Problems:   Active Hospital Problems    Diagnosis     **Debility     Ulcerated, foot, left, limited to breakdown of skin     Onychomycosis     Ulcer of right foot         Nutrition/Diet History         Narrative   At my visit pt was upset about the addition of low sodium to his diet. This was added due to fluid overload. Pt continues to consume % of meals. Noted pt refused this week's weight. Pt continues to take Ozempic. BM 5/30.         Anthropometrics        Current Height, Weight Height: 188 cm (74.02\")  Weight:  (patient refused to get up to weigh for his weekly weight, RN notified)   Current BMI Body mass index is 44.42 kg/m².   BMI Classification Obese Class III   % %   Adjusted Body Weight (ABW) 112 kg   Weight Hx  Wt Readings from Last 30 Encounters:   05/22/24 1000 (!) 157 kg (346 lb 2 oz)   05/15/24 0545 (!) 153 kg (338 lb 3 oz)   05/08/24 1100 (!) 154 kg (340 lb 6.2 oz)   05/08/24 1029 (!) 154 kg (340 lb 6.2 oz) "   05/01/24 1100 (!) 156 kg (343 lb 14.7 oz)   04/24/24 0900 (!) 159 kg (350 lb 1.5 oz)   04/17/24 1124 (!) 161 kg (355 lb 9.6 oz)   04/11/24 1509 (!) 162 kg (356 lb 11.3 oz)   04/02/24 1118 (!) 157 kg (345 lb 0.3 oz)   03/26/24 1003 (!) 143 kg (314 lb 2.5 oz)   03/18/24 1323 (!) 151 kg (332 lb 3.7 oz)   03/18/24 0500 (!) 171 kg (378 lb 1.4 oz)   03/17/24 0500 (!) 173 kg (380 lb 15.3 oz)   03/16/24 0500 (!) 171 kg (376 lb 15.8 oz)   03/15/24 0500 (!) 161 kg (354 lb 8 oz)   03/14/24 0300 (!) 173 kg (382 lb 4.4 oz)   03/12/24 0500 (!) 158 kg (347 lb 14.2 oz)   03/11/24 0500 (!) 153 kg (337 lb 8.4 oz)   03/10/24 0540 (!) 154 kg (339 lb 4.6 oz)   03/09/24 0500 (!) 153 kg (337 lb 1.3 oz)   03/08/24 1323 (!) 153 kg (337 lb 8 oz)   03/08/24 0500 (!) 157 kg (346 lb 2 oz)   03/06/24 2300 (!) 155 kg (342 lb 2.5 oz)   03/05/24 0415 (!) 155 kg (342 lb 13 oz)   03/04/24 0500 (!) 156 kg (344 lb 9.3 oz)   03/03/24 0500 (!) 156 kg (344 lb 9.3 oz)   03/02/24 0530 (!) 157 kg (346 lb 12.5 oz)   03/01/24 0500 (!) 157 kg (345 lb 3.9 oz)   02/29/24 0500 (!) 157 kg (347 lb 3.6 oz)   02/28/24 0509 (!) 158 kg (347 lb 14.2 oz)   02/27/24 0500 (!) 159 kg (351 lb 3.1 oz)   02/26/24 0500 (!) 159 kg (350 lb 12 oz)   02/25/24 0500 (!) 160 kg (351 lb 10.1 oz)   02/24/24 0500 (!) 160 kg (352 lb 1.2 oz)   02/23/24 0500 (!) 160 kg (353 lb 6.4 oz)   02/22/24 0500 (!) 160 kg (353 lb 13.4 oz)   02/21/24 0514 (!) 162 kg (356 lb 7.7 oz)   02/20/24 0500 (!) 161 kg (355 lb 2.6 oz)   02/19/24 0300 (!) 160 kg (353 lb 6.4 oz)   02/18/24 0500 (!) 162 kg (356 lb 7.7 oz)   02/17/24 0500 (!) 162 kg (357 lb 2.3 oz)   02/16/24 0500 (!) 162 kg (358 lb 0.4 oz)   02/15/24 0532 (!) 163 kg (360 lb 3.7 oz)   02/14/24 0600 (!) 162 kg (357 lb 5.9 oz)   02/13/24 0500 (!) 162 kg (357 lb 9.4 oz)   02/12/24 1352 (!) 160 kg (352 lb 4.7 oz)   02/12/24 0500 (!) 166 kg (367 lb 1.1 oz)   02/11/24 0500 (!) 169 kg (372 lb 2.2 oz)   02/10/24 0500 (!) 168 kg (370 lb 9.5 oz)   02/09/24  0600 (!) 168 kg (370 lb 9.5 oz)   02/08/24 0600 (!) 165 kg (363 lb 15.7 oz)   02/07/24 0600 (!) 166 kg (366 lb 10 oz)   02/06/24 0419 (!) 166 kg (366 lb 10 oz)   02/05/24 0500 (!) 167 kg (367 lb 4.6 oz)   02/04/24 0500 (!) 167 kg (367 lb 8.1 oz)   02/03/24 0500 (!) 166 kg (366 lb 6.5 oz)   02/02/24 0500 (!) 168 kg (369 lb 14.9 oz)   02/01/24 0613 (!) 169 kg (372 lb 5.7 oz)   01/31/24 0500 (!) 168 kg (371 lb 4.1 oz)   01/30/24 0500 (!) 169 kg (372 lb 12.8 oz)   01/29/24 0232 (!) 169 kg (372 lb 5.7 oz)   01/28/24 0500 (!) 167 kg (368 lb 6.2 oz)   01/26/24 0400 (!) 169 kg (372 lb 2.2 oz)   01/25/24 0417 (!) 167 kg (368 lb 9.8 oz)   01/24/24 0608 (!) 168 kg (371 lb 7.6 oz)   01/23/24 0500 (!) 168 kg (369 lb 14.9 oz)   01/22/24 0500 (!) 168 kg (371 lb 4.1 oz)   01/21/24 0500 (!) 168 kg (371 lb 4.1 oz)   01/20/24 0500 (!) 168 kg (371 lb 7.6 oz)   01/19/24 0500 (!) 171 kg (376 lb 1.7 oz)   01/18/24 0500 (!) 172 kg (380 lb 1.2 oz)   01/17/24 0614 (!) 172 kg (379 lb 6.6 oz)   01/16/24 0500 (!) 171 kg (378 lb 1.4 oz)   01/15/24 0500 (!) 169 kg (372 lb 2.2 oz)   01/14/24 0546 (!) 169 kg (373 lb 7.4 oz)   01/13/24 0507 (!) 171 kg (376 lb 5.2 oz)   01/12/24 0600 (!) 170 kg (374 lb 12.5 oz)   01/11/24 0600 (!) 169 kg (371 lb 14.7 oz)   01/10/24 0600 (!) 169 kg (371 lb 11.1 oz)   01/09/24 0500 (!) 168 kg (370 lb 9.5 oz)   01/08/24 0448 (!) 168 kg (370 lb 9.5 oz)   01/07/24 0454 (!) 167 kg (367 lb 15.2 oz)   01/06/24 0500 (!) 166 kg (366 lb 10 oz)   01/05/24 0555 (!) 165 kg (364 lb 6.7 oz)   01/04/24 0500 (!) 165 kg (363 lb 12.1 oz)   01/03/24 0500 (!) 166 kg (365 lb 1.3 oz)   01/02/24 0500 (!) 167 kg (367 lb 4.6 oz)   01/01/24 0500 (!) 166 kg (365 lb 11.9 oz)   12/31/23 0500 (!) 160 kg (352 lb 4.7 oz)   12/30/23 0500 (!) 167 kg (367 lb 15.2 oz)   12/29/23 0500 (!) 166 kg (366 lb 10 oz)   12/28/23 0500 (!) 165 kg (364 lb 13.8 oz)   12/27/23 0500 (!) 166 kg (367 lb 1.1 oz)   12/26/23 0500 (!) 167 kg (367 lb 4.6 oz)   12/25/23 0500  (!) 165 kg (364 lb 3.2 oz)   12/24/23 0500 (!) 164 kg (362 lb 7 oz)   12/23/23 0500 (!) 163 kg (360 lb 0.2 oz)   12/22/23 0600 (!) 163 kg (358 lb 14.5 oz)   12/21/23 0500 (!) 163 kg (359 lb 12.7 oz)   12/20/23 0500 (!) 162 kg (357 lb 9.4 oz)   12/19/23 0550 (!) 163 kg (359 lb 5.6 oz)   12/18/23 0500 (!) 161 kg (355 lb 13.2 oz)   12/17/23 0500 (!) 160 kg (353 lb 13.4 oz)   12/16/23 1541 (!) 160 kg (353 lb 3.2 oz)   12/16/23 0500 (!) 165 kg (364 lb 13.8 oz)   12/15/23 0500 (!) 165 kg (362 lb 10.5 oz)   12/14/23 0500 (!) 157 kg (345 lb 14.4 oz)   12/13/23 0500 (!) 153 kg (337 lb 4.9 oz)   12/12/23 0500 (!) 155 kg (341 lb 0.8 oz)   12/11/23 1049 (!) 155 kg (341 lb 0.8 oz)   12/11/23 0500 (!) 160 kg (353 lb 13.4 oz)   12/10/23 0532 (!) 162 kg (356 lb 7.7 oz)   12/09/23 0500 (!) 151 kg (332 lb 10.8 oz)   12/08/23 0500 (!) 153 kg (336 lb 13.8 oz)   12/06/23 0500 (!) 154 kg (340 lb 6.2 oz)   12/05/23 0607 (!) 161 kg (354 lb 15.1 oz)   12/04/23 0500 (!) 161 kg (354 lb 4.5 oz)   12/03/23 0400 (!) 161 kg (354 lb 11.5 oz)   11/21/23 1155 (!) 162 kg (357 lb 12.8 oz)   11/09/23 0500 (!) 160 kg (353 lb 9.9 oz)   11/06/23 1422 (!) 158 kg (348 lb 5.2 oz)   11/02/23 1155 (!) 158 kg (348 lb 15.8 oz)   09/11/23 0030 (!) 151 kg (334 lb)   09/10/23 1844 (!) 154 kg (338 lb 10 oz)   08/30/23 1112 (!) 157 kg (347 lb)   08/01/23 0932 (!) 158 kg (347 lb 10.7 oz)   07/31/23 2347 (!) 163 kg (358 lb 7.5 oz)   06/16/23 2333 (!) 155 kg (342 lb 2.5 oz)   06/16/23 1053 (!) 155 kg (342 lb 3.2 oz)   06/15/23 0505 (!) 149 kg (328 lb 14.4 oz)   06/14/23 0455 (!) 149 kg (328 lb 4.8 oz)   06/13/23 1703 (!) 149 kg (328 lb 11.2 oz)   06/13/23 0600 (!) 170 kg (374 lb 12.5 oz)   06/12/23 0420 (!) 160 kg (352 lb 11.8 oz)   06/11/23 0447 (!) 150 kg (331 lb 5.6 oz)   06/10/23 0500 (!) 150 kg (330 lb 14.6 oz)   06/09/23 0536 (!) 156 kg (343 lb 7.6 oz)   06/08/23 1826 (!) 156 kg (344 lb 11.2 oz)   06/08/23 0522 (!) 158 kg (348 lb 8.8 oz)   06/07/23 0548 (!) 155 kg  (342 lb 9.5 oz)   06/06/23 0515 (!) 155 kg (341 lb 14.9 oz)   06/05/23 0445 (!) 155 kg (341 lb 9.6 oz)   06/05/23 0348 (!) 158 kg (349 lb 3.3 oz)   06/04/23 0555 (!) 157 kg (346 lb 3.2 oz)   06/03/23 0539 (!) 158 kg (349 lb)   06/02/23 0548 (!) 163 kg (359 lb 3.2 oz)   06/01/23 0600 (!) 164 kg (362 lb)   05/31/23 0507 (!) 164 kg (362 lb 4.8 oz)   05/30/23 0635 (!) 164 kg (362 lb 7 oz)   05/29/23 0500 (!) 167 kg (368 lb 2.7 oz)   05/28/23 0600 (!) 167 kg (367 lb 11.6 oz)   05/27/23 0600 (!) 167 kg (369 lb 0.8 oz)   05/26/23 0529 (!) 167 kg (369 lb 3.2 oz)   05/25/23 0600 (!) 168 kg (370 lb 3.2 oz)   05/24/23 0600 (!) 166 kg (366 lb 9.6 oz)   05/22/23 0529 (!) 169 kg (373 lb 7.4 oz)   05/21/23 0600 (!) 169 kg (371 lb 12.8 oz)   05/20/23 0600 (!) 171 kg (376 lb 5.2 oz)   05/19/23 0300 (!) 168 kg (370 lb 14.4 oz)   05/18/23 1912 (!) 168 kg (371 lb 7.6 oz)   05/18/23 0600 (!) 169 kg (371 lb 11.1 oz)   05/16/23 0700 (!) 171 kg (377 lb 4.8 oz)   05/14/23 0500 (!) 171 kg (377 lb 3.3 oz)   05/12/23 1143 (!) 170 kg (375 lb)   05/06/23 0258 (!) 170 kg (375 lb 8 oz)   04/19/23 0909 (!) 163 kg (359 lb)   04/03/23 1906 (!) 168 kg (370 lb)   03/27/23 0938 (!) 170 kg (373 lb 10.9 oz)   03/17/23 1153 (!) 168 kg (370 lb)   01/27/23 1501 (!) 168 kg (370 lb)   12/22/22 1501 (!) 171 kg (376 lb)   11/08/22 1035 (!) 161 kg (355 lb)   10/01/22 1141 (!) 164 kg (360 lb 10.8 oz)   05/18/22 1311 (!) 155 kg (341 lb)   03/24/22 1432 (!) 155 kg (341 lb)   03/02/22 1412 (!) 155 kg (341 lb)   01/12/22 1317 (!) 155 kg (341 lb)   12/30/21 1431 (!) 155 kg (341 lb)   12/01/21 1144 (!) 155 kg (341 lb 11.4 oz)   12/01/21 0843 (!) 157 kg (346 lb)   11/22/21 0839 (!) 157 kg (346 lb)   11/15/21 1148 (!) 157 kg (346 lb 12.5 oz)   11/09/21 1139 (!) 157 kg (345 lb 7.4 oz)   11/05/21 1130 (!) 159 kg (351 lb 3.1 oz)   11/02/21 1121 (!) 161 kg (356 lb)   10/27/21 1048 (!) 161 kg (356 lb)   10/21/21 1418 (!) 162 kg (356 lb 14.8 oz)          Wt Change Observation  Wt loss on Ozempic     Estimated/Assessed Needs  Estimated Needs based on: Adjusted Body Weight       Energy Requirements 25 kcal/kg    EST Needs (kcal/day) 2600       Protein Requirements 1.0-1.2 g/kg   EST Daily Needs (g/day) 104-125       Fluid Requirements 25 ml/kg    Estimated Needs (mL/day) 2600     Labs/Medications         Pertinent Labs Reviewed.   Results from last 7 days   Lab Units 05/28/24  0452   SODIUM mmol/L 136   POTASSIUM mmol/L 3.8   CHLORIDE mmol/L 103   CO2 mmol/L 25.3   BUN mg/dL 13   CREATININE mg/dL 0.58*   CALCIUM mg/dL 8.4*   GLUCOSE mg/dL 99     Results from last 7 days   Lab Units 05/28/24  0452   MAGNESIUM mg/dL 1.9   PHOSPHORUS mg/dL 3.7   HEMOGLOBIN g/dL 11.7*   HEMATOCRIT % 36.0*       COVID19   Date Value Ref Range Status   03/11/2024 Not Detected Not Detected - Ref. Range Final     Lab Results   Component Value Date    HGBA1C 5.00 05/06/2024         Pertinent Medications Reviewed.     Malnutrition Severity Assessment              Nutrition Diagnosis         Nutrition Dx Problem 1 Increased nutrient needs related to increased protein demand as evidenced by impaired skin integrity.     Nutrition Intervention           Current Nutrition Orders & Evaluation of Intake       Current PO Diet Diet: Diabetic Diets, High Protein Diet, Cardiac; Low Sodium (2g); Texture: Regular Texture (IDDSI 7); Fluid Consistency: Thin (IDDSI 0)   Supplement No active supplement orders           Nutrition Intervention/Prescription        High protein diet         Medical Nutrition Therapy/Nutrition Education          Learner     Readiness Patient  Acceptance     Method     Response Explanation  Verbalizes understanding     Monitor/Evaluation        Monitor PO intake, Weight, Skin status     Nutrition Discharge Plan         Continue high protein diet upon discharge      Electronically signed by:  Caryl Littlejohn RD  05/31/24 08:51 EDT

## 2024-05-31 NOTE — SIGNIFICANT NOTE
05/31/24 1416   OTHER   Discipline physical therapy assistant   Rehab Time/Intention   Session Not Performed patient unavailable for treatment

## 2024-06-01 LAB
GLUCOSE BLDC GLUCOMTR-MCNC: 101 MG/DL (ref 70–99)
GLUCOSE BLDC GLUCOMTR-MCNC: 112 MG/DL (ref 70–99)
GLUCOSE BLDC GLUCOMTR-MCNC: 119 MG/DL (ref 70–99)
GLUCOSE BLDC GLUCOMTR-MCNC: 119 MG/DL (ref 70–99)

## 2024-06-01 PROCEDURE — 63710000001 INSULIN GLARGINE PER 5 UNITS: Performed by: INTERNAL MEDICINE

## 2024-06-01 PROCEDURE — 82948 REAGENT STRIP/BLOOD GLUCOSE: CPT

## 2024-06-01 PROCEDURE — 97110 THERAPEUTIC EXERCISES: CPT

## 2024-06-01 PROCEDURE — 82948 REAGENT STRIP/BLOOD GLUCOSE: CPT | Performed by: INTERNAL MEDICINE

## 2024-06-01 PROCEDURE — 63710000001 ONDANSETRON ODT 4 MG TABLET DISPERSIBLE: Performed by: INTERNAL MEDICINE

## 2024-06-01 RX ADMIN — EMPAGLIFLOZIN 10 MG: 10 TABLET, FILM COATED ORAL at 10:44

## 2024-06-01 RX ADMIN — LISINOPRIL 2.5 MG: 2.5 TABLET ORAL at 10:44

## 2024-06-01 RX ADMIN — CETIRIZINE HYDROCHLORIDE 10 MG: 10 TABLET, FILM COATED ORAL at 10:44

## 2024-06-01 RX ADMIN — Medication 1 APPLICATION: at 22:02

## 2024-06-01 RX ADMIN — APIXABAN 5 MG: 5 TABLET, FILM COATED ORAL at 10:44

## 2024-06-01 RX ADMIN — SERTRALINE HYDROCHLORIDE 50 MG: 50 TABLET ORAL at 22:02

## 2024-06-01 RX ADMIN — INSULIN GLARGINE 35 UNITS: 100 INJECTION, SOLUTION SUBCUTANEOUS at 10:44

## 2024-06-01 RX ADMIN — FUROSEMIDE 60 MG: 40 TABLET ORAL at 10:44

## 2024-06-01 RX ADMIN — DICLOFENAC SODIUM 4 G: 10 GEL TOPICAL at 01:50

## 2024-06-01 RX ADMIN — APIXABAN 5 MG: 5 TABLET, FILM COATED ORAL at 22:02

## 2024-06-01 RX ADMIN — Medication 10 MG: at 22:02

## 2024-06-01 RX ADMIN — ONDANSETRON 4 MG: 4 TABLET, ORALLY DISINTEGRATING ORAL at 13:48

## 2024-06-01 RX ADMIN — Medication 500 MG: at 22:02

## 2024-06-01 RX ADMIN — PREGABALIN 100 MG: 100 CAPSULE ORAL at 10:44

## 2024-06-01 RX ADMIN — PREGABALIN 100 MG: 100 CAPSULE ORAL at 16:01

## 2024-06-01 RX ADMIN — PREGABALIN 100 MG: 100 CAPSULE ORAL at 22:02

## 2024-06-01 RX ADMIN — DICLOFENAC SODIUM 4 G: 10 GEL TOPICAL at 10:48

## 2024-06-01 RX ADMIN — MONTELUKAST 10 MG: 10 TABLET, FILM COATED ORAL at 22:02

## 2024-06-01 RX ADMIN — INSULIN GLARGINE 35 UNITS: 100 INJECTION, SOLUTION SUBCUTANEOUS at 22:01

## 2024-06-01 RX ADMIN — DICLOFENAC SODIUM 4 G: 10 GEL TOPICAL at 13:30

## 2024-06-01 RX ADMIN — ACETAMINOPHEN 650 MG: 325 TABLET ORAL at 02:18

## 2024-06-01 RX ADMIN — FERROUS SULFATE TAB 325 MG (65 MG ELEMENTAL FE) 325 MG: 325 (65 FE) TAB at 10:44

## 2024-06-01 RX ADMIN — BACLOFEN 10 MG: 10 TABLET ORAL at 16:00

## 2024-06-01 RX ADMIN — ATORVASTATIN CALCIUM 10 MG: 10 TABLET, FILM COATED ORAL at 22:02

## 2024-06-01 RX ADMIN — OXYCODONE AND ACETAMINOPHEN 1 TABLET: 10; 325 TABLET ORAL at 16:00

## 2024-06-01 RX ADMIN — IBUPROFEN 400 MG: 400 TABLET ORAL at 11:10

## 2024-06-01 RX ADMIN — OXYCODONE AND ACETAMINOPHEN 1 TABLET: 10; 325 TABLET ORAL at 05:48

## 2024-06-01 RX ADMIN — CYANOCOBALAMIN TAB 500 MCG 1000 MCG: 500 TAB at 10:44

## 2024-06-01 RX ADMIN — BACLOFEN 10 MG: 10 TABLET ORAL at 05:48

## 2024-06-01 RX ADMIN — Medication 500 MG: at 10:44

## 2024-06-01 NOTE — THERAPY TREATMENT NOTE
SNF - Physical Therapy Progress Note  [facility]     Patient Name: Jose Shaikh  : 1960  MRN: [id number]  Today's Date: 2024      Visit Dx:    ICD-10-CM ICD-9-CM   1. Difficulty in walking  R26.2 719.7   2. Type 2 diabetes mellitus with other specified complication, unspecified whether long term insulin use  E11.69 250.80     Patient Active Problem List   Diagnosis    Diabetic ulcer of left heel associated with type 2 DM    Acute osteomyelitis of left calcaneus     Diabetic ulcer of left heel associated with type 2 DM    Diabetic ulcer of right midfoot associated with type 2 DM    Paroxysmal atrial fibrillation    Essential hypertension    Hyperlipidemia LDL goal <100    Cellulitis and abscess of foot    High alkaline phosphatase level    Osteomyelitis    Onychomycosis    Onychocryptosis    Foot pain, bilateral    Osteomyelitis of foot, right, acute    Cellulitis of right foot    Type 2 diabetes mellitus, with long-term current use of insulin    Class 3 severe obesity due to excess calories with serious comorbidity and body mass index (BMI) of 45.0 to 49.9 in adult    Anxiety disorder, unspecified    Claustrophobia    Dependence on wheelchair    Depression, unspecified    Long term (current) use of anticoagulants    Long term (current) use of oral hypoglycemic drugs    Wound of foot    Ulcer of right foot    Orthostatic hypotension    Other chronic osteomyelitis, right ankle and foot    Personal history of nicotine dependence    Thrombocytopenia, unspecified    Unspecified open wound, right foot, initial encounter    Diabetic foot infection    Subacute osteomyelitis of right foot    Right foot pain    Sepsis    Onychomycosis    Foot pain, left    Impaired mobility and ADLs    Absence of toe of right foot    Corns and callosity    Disability of walking    Fracture    Limb swelling    Polyneuropathy    Pressure ulcer, stage 1    Shortness of breath    Generalized weakness    Debility    Ulcerated, foot,  left, limited to breakdown of skin    Onychomycosis     Past Medical History:   Diagnosis Date    Absence of toe of right foot     Acute osteomyelitis of left calcaneus  08/18/2021    Anxiety and depression     Arthritis     Cancer     Chronic pain     STATES HIS PAIN IS 10/10 AAT    Claustrophobia     Corns and callus     Diabetic ulcer of left heel associated with type 2 DM 08/18/2021    Diabetic ulcer of left heel associated with type 2 DM 07/06/2021    Diabetic ulcer of right midfoot associated with type 2 DM 08/18/2021    Difficulty walking     Essential hypertension 08/31/2021    Hammertoe     Hyperlipidemia LDL goal <100 08/31/2021    Ingrown toenail     Obesity     Paroxysmal atrial fibrillation 08/31/2021    Polyneuropathy     Pressure ulcer, stage 1     Tinea unguium     Type 2 diabetes mellitus with polyneuropathy      Past Surgical History:   Procedure Laterality Date    CYST REMOVAL      center of back; benign    EYE SURGERY      INCISION AND DRAINAGE ABSCESS      back    INCISION AND DRAINAGE LEG Right 12/10/2021    Procedure: INCISION AND DRAINAGE LOWER EXTREMITY;  Surgeon: Ash Leyva DPM;  Location: [facility];  Service: Podiatry;  Laterality: Right;    OTHER SURGICAL HISTORY      Surgical clips left foot    TOE SURGERY Right     Removal of 5th toe    TRANS METATARSAL AMPUTATION Right 12/02/2021    Procedure: AMPUTATION TRANS METATARSAL;  Surgeon: Ash Leyva DPM;  Location: [facility] OR;  Service: Podiatry;  Laterality: Right;    VASCULAR SURGERY      WRIST SURGERY Left     repair of injury       PT Assessment (Last 12 Hours)       PT Evaluation and Treatment       Row Name 06/01/24 1600          Physical Therapy Time and Intention    Document Type therapy note (daily note)  -CS     Mode of Treatment individual therapy;physical therapy  -CS     Patient Effort good  -CS       Row Name 06/01/24 1600          Hip (Therapeutic Exercise)    Hip AROM (Therapeutic Exercise)  bilateral;aBduction;aDduction;supine;30 repititions  IR/ER on R LE  -CS     Hip AAROM (Therapeutic Exercise) left;external rotation;internal rotation;10 repetitions;3 sets;supine  -CS     Hip Isometrics (Therapeutic Exercise) bilateral;gluteal sets;supine;10 repetitions;3 second hold;3 sets  -CS       Row Name 06/01/24 1600          Knee (Therapeutic Exercise)    Knee AAROM (Therapeutic Exercise) bilateral;supine  x 50 reps SLR's  -CS     Knee Isometrics (Therapeutic Exercise) bilateral;supine;10 repetitions;3 sets;3 second hold  -CS     Knee Strengthening (Therapeutic Exercise) bilateral;SAQ (short arc quad);4 lb free weight;supine  x 50 reps  -CS       Row Name 06/01/24 1600          Ankle (Therapeutic Exercise)    Ankle AROM (Therapeutic Exercise) bilateral;dorsiflexion;plantarflexion;supine;30 repititions  -CS       Row Name             Wound 03/27/24 1045 Right anterior Skin Tear    Wound - Properties Group Placement Date: 03/27/24  -CR Placement Time: 1045  -CR Side: Right  -CR Orientation: anterior  -CR Primary Wound Type: Skin tear  -CR    Retired Wound - Properties Group Placement Date: 03/27/24  -CR Placement Time: 1045  -CR Side: Right  -CR Orientation: anterior  -CR Primary Wound Type: Skin tear  -CR    Retired Wound - Properties Group Date first assessed: 03/27/24  -CR Time first assessed: 1045  -CR Side: Right  -CR Primary Wound Type: Skin tear  -CR      Row Name             Wound 05/02/24 1033 Left posterior plantar    Wound - Properties Group Placement Date: 05/02/24  -KR Placement Time: 1033  -KR Side: Left  -KR Orientation: posterior  -KR Location: plantar  -KR    Retired Wound - Properties Group Placement Date: 05/02/24  -KR Placement Time: 1033  -KR Side: Left  -KR Orientation: posterior  -KR Location: plantar  -KR    Retired Wound - Properties Group Date first assessed: 05/02/24  -KR Time first assessed: 1033  -KR Side: Left  -KR Location: plantar  -KR      Row Name 06/01/24 1600           Positioning and Restraints    Pre-Treatment Position in bed  -CS     Post Treatment Position bed  -CS     In Bed fowlers;call light within reach;encouraged to call for assist  -CS       Row Name 06/01/24 1600          Progress Summary (PT)    Progress Toward Functional Goals (PT) progress toward functional goals is gradual  -CS               User Key  (r) = Recorded By, (t) = Taken By, (c) = Cosigned By      Initials Name Provider Type    CR Adislon Baker LPN Licensed Nurse    Ronald Huerta PTA Physical Therapist Assistant    Almaz Morales RN Registered Nurse                  Section G              Section GG                       Physical Therapy Education       Title: PT OT SLP Therapies (Done)       Topic: Physical Therapy (Done)       Point: Mobility training (Done)       Learning Progress Summary             Patient Acceptance, E,TB, VU by  at 5/28/2024 0439    Acceptance, E,D, DU by PG at 4/24/2024 1039    Acceptance, E,TB, VU by RM at 2/14/2024 0518    Acceptance, E,TB, VU by RM at 1/9/2024 0420    Acceptance, E, VU by CR at 12/30/2023 1750    Acceptance, E, VU by CR at 12/20/2023 1345    Acceptance, E, VU by CR at 12/19/2023 1004    Acceptance, E,TB, VU by RM at 11/30/2023 0420    Acceptance, E,TB, VU by MOE at 11/8/2023 1341                         Point: Precautions (Done)       Learning Progress Summary             Patient Acceptance, E,TB, VU by RM at 5/28/2024 0439    Acceptance, E,D, DU by PG at 4/24/2024 1039    Acceptance, E,TB, VU by RM at 2/14/2024 0518    Acceptance, E,TB, VU by RM at 1/9/2024 0420    Acceptance, E, VU by CR at 12/30/2023 1750    Acceptance, E,TB, VU by RM at 11/30/2023 0420    Acceptance, E,TB, VU by MOE at 11/8/2023 1341                                         User Key       Initials Effective Dates Name Provider Type Discipline     06/08/21 -  Bharath Berg RN Registered Nurse Nurse    ROLY 06/13/22 -  Adlison Baker LPN Licensed Nurse Nurse    BOB 06/16/21 -   Kodak Matos, OT Occupational Therapist OT    MOE 06/03/21 -  Merlin Rao PT Physical Therapist PT                  PT Recommendation and Plan     Progress Summary (PT)  Progress Toward Functional Goals (PT): progress toward functional goals is gradual  Daily Progress Summary (PT): Decreased assistance for ther-ex's today in comparison to treatment I performed with pt. last weekend.  Pt. still requires assistance but overall, assistance level decreased with range of motion in bilateral lower extremeties.   Outcome Measures       Row Name 06/01/24 1600 05/30/24 1428          How much help from another person do you currently need...    Turning from your back to your side while in flat bed without using bedrails? 4  -CS 4  -WM     Moving from lying on back to sitting on the side of a flat bed without bedrails? 3  -CS 3  -WM     Moving to and from a bed to a chair (including a wheelchair)? 2  -CS 2  -WM     Standing up from a chair using your arms (e.g., wheelchair, bedside chair)? 2  -CS 2  -WM     Climbing 3-5 steps with a railing? 1  -CS 1  -WM     To walk in hospital room? 2  -CS 2  -WM     AM-PAC 6 Clicks Score (PT) 14  -CS 14  -WM     Highest Level of Mobility Goal 4 --> Transfer to chair/commode  -CS 4 --> Transfer to chair/commode  -WM        Functional Assessment    Outcome Measure Options AM-PAC 6 Clicks Basic Mobility (PT)  -CS --               User Key  (r) = Recorded By, (t) = Taken By, (c) = Cosigned By      Initials Name Provider Type    WM Carson Inman PTA Physical Therapist Assistant    Ronald Huerta PTA Physical Therapist Assistant                     Time Calculation:    PT Charges       Row Name 06/01/24 1638             Time Calculation    Start Time 1404  -CS      PT Received On 06/01/24  -         Timed Charges    62877 - PT Therapeutic Exercise Minutes 31  -CS         SNF Physical Therapy Minutes    Skilled Minutes- PT 31 min  -CS         Total Minutes    Timed Charges Total  Minutes 31  -CS       Total Minutes 31  -CS                User Key  (r) = Recorded By, (t) = Taken By, (c) = Cosigned By      Initials Name Provider Type    CS Ronald Fabian PTA Physical Therapist Assistant                  Therapy Charges for Today       Code Description Service Date Service Provider Modifiers Qty    [id number] HC PT THER PROC EA 15 MIN 6/1/2024 Ronald Fabian PTA GP 2            PT G-Codes  Outcome Measure Options: AM-PAC 6 Clicks Basic Mobility (PT)  AM-PAC 6 Clicks Score (PT): 14  AM-PAC 6 Clicks Score (OT): 14    Ronald Fabian PTA  6/1/2024

## 2024-06-01 NOTE — PLAN OF CARE
Goal Outcome Evaluation:  Plan of Care Reviewed With: patient      Pt alert and oriented x 4. Requested pain medication at bedtime and during the night. Oxycodone and tylenol were given as well as baclofen. Pt slept most of the night with no further complaints. Call light in reach and able to make needs known.

## 2024-06-01 NOTE — PLAN OF CARE
Goal Outcome Evaluation:  Plan of Care Reviewed With: patient        Progress: no change  Outcome Evaluation: Given PRN Ibuprofen at 1110 for left shoulder pain. Given PRN Baclofen and Percocet at 1600 for left hip pain. Given PRN Zofran at 1348 for c/o nausea. All meds effective. No major changes this shift. Con't to refuse bath, Tx, and turns. Voices needs. Con't current POC.

## 2024-06-02 LAB
GLUCOSE BLDC GLUCOMTR-MCNC: 100 MG/DL (ref 70–99)
GLUCOSE BLDC GLUCOMTR-MCNC: 106 MG/DL (ref 70–99)
GLUCOSE BLDC GLUCOMTR-MCNC: 116 MG/DL (ref 70–99)
GLUCOSE BLDC GLUCOMTR-MCNC: 80 MG/DL (ref 70–99)

## 2024-06-02 PROCEDURE — 82948 REAGENT STRIP/BLOOD GLUCOSE: CPT | Performed by: INTERNAL MEDICINE

## 2024-06-02 PROCEDURE — 63710000001 INSULIN GLARGINE PER 5 UNITS: Performed by: INTERNAL MEDICINE

## 2024-06-02 PROCEDURE — 82948 REAGENT STRIP/BLOOD GLUCOSE: CPT

## 2024-06-02 RX ADMIN — PREGABALIN 100 MG: 100 CAPSULE ORAL at 08:27

## 2024-06-02 RX ADMIN — DICLOFENAC SODIUM 4 G: 10 GEL TOPICAL at 08:26

## 2024-06-02 RX ADMIN — INSULIN GLARGINE 35 UNITS: 100 INJECTION, SOLUTION SUBCUTANEOUS at 22:50

## 2024-06-02 RX ADMIN — Medication 10 MG: at 22:51

## 2024-06-02 RX ADMIN — PREGABALIN 100 MG: 100 CAPSULE ORAL at 22:54

## 2024-06-02 RX ADMIN — DICLOFENAC SODIUM 4 G: 10 GEL TOPICAL at 22:54

## 2024-06-02 RX ADMIN — BACLOFEN 10 MG: 10 TABLET ORAL at 00:08

## 2024-06-02 RX ADMIN — Medication 500 MG: at 08:26

## 2024-06-02 RX ADMIN — IBUPROFEN 400 MG: 400 TABLET ORAL at 02:25

## 2024-06-02 RX ADMIN — OXYCODONE AND ACETAMINOPHEN 1 TABLET: 10; 325 TABLET ORAL at 00:08

## 2024-06-02 RX ADMIN — LISINOPRIL 2.5 MG: 2.5 TABLET ORAL at 08:27

## 2024-06-02 RX ADMIN — OXYCODONE AND ACETAMINOPHEN 1 TABLET: 10; 325 TABLET ORAL at 18:56

## 2024-06-02 RX ADMIN — FERROUS SULFATE TAB 325 MG (65 MG ELEMENTAL FE) 325 MG: 325 (65 FE) TAB at 08:26

## 2024-06-02 RX ADMIN — APIXABAN 5 MG: 5 TABLET, FILM COATED ORAL at 08:27

## 2024-06-02 RX ADMIN — FUROSEMIDE 60 MG: 40 TABLET ORAL at 08:27

## 2024-06-02 RX ADMIN — INSULIN GLARGINE 35 UNITS: 100 INJECTION, SOLUTION SUBCUTANEOUS at 08:26

## 2024-06-02 RX ADMIN — Medication 500 MG: at 22:51

## 2024-06-02 RX ADMIN — CETIRIZINE HYDROCHLORIDE 10 MG: 10 TABLET, FILM COATED ORAL at 08:27

## 2024-06-02 RX ADMIN — ATORVASTATIN CALCIUM 10 MG: 10 TABLET, FILM COATED ORAL at 22:51

## 2024-06-02 RX ADMIN — CYANOCOBALAMIN TAB 500 MCG 1000 MCG: 500 TAB at 08:27

## 2024-06-02 RX ADMIN — APIXABAN 5 MG: 5 TABLET, FILM COATED ORAL at 22:51

## 2024-06-02 RX ADMIN — SERTRALINE HYDROCHLORIDE 50 MG: 50 TABLET ORAL at 22:51

## 2024-06-02 RX ADMIN — EMPAGLIFLOZIN 10 MG: 10 TABLET, FILM COATED ORAL at 08:27

## 2024-06-02 RX ADMIN — Medication 1 APPLICATION: at 10:28

## 2024-06-02 RX ADMIN — BACLOFEN 10 MG: 10 TABLET ORAL at 08:27

## 2024-06-02 RX ADMIN — ACETAMINOPHEN 650 MG: 325 TABLET ORAL at 05:55

## 2024-06-02 RX ADMIN — DICLOFENAC SODIUM 4 G: 10 GEL TOPICAL at 01:43

## 2024-06-02 RX ADMIN — OXYCODONE AND ACETAMINOPHEN 1 TABLET: 10; 325 TABLET ORAL at 08:27

## 2024-06-02 RX ADMIN — BACLOFEN 10 MG: 10 TABLET ORAL at 18:55

## 2024-06-02 RX ADMIN — MONTELUKAST 10 MG: 10 TABLET, FILM COATED ORAL at 22:51

## 2024-06-02 RX ADMIN — PREGABALIN 100 MG: 100 CAPSULE ORAL at 15:41

## 2024-06-02 RX ADMIN — DICLOFENAC SODIUM 4 G: 10 GEL TOPICAL at 14:55

## 2024-06-02 NOTE — PLAN OF CARE
Problem: Adult Inpatient Plan of Care  Goal: Absence of Hospital-Acquired Illness or Injury  Intervention: Identify and Manage Fall Risk  Recent Flowsheet Documentation  Taken 6/2/2024 0300 by Roseline Garcia LPN  Safety Promotion/Fall Prevention: safety round/check completed  Taken 6/2/2024 0100 by Roseline Garcia LPN  Safety Promotion/Fall Prevention: safety round/check completed  Taken 6/1/2024 2300 by Roseline Garcia LPN  Safety Promotion/Fall Prevention: safety round/check completed  Taken 6/1/2024 2220 by Roseline Garcia LPN  Safety Promotion/Fall Prevention: safety round/check completed  Taken 6/1/2024 2100 by Roseline Garcia LPN  Safety Promotion/Fall Prevention: safety round/check completed  Taken 6/1/2024 1911 by Roseline Garcia LPN  Safety Promotion/Fall Prevention: safety round/check completed  Intervention: Prevent Skin Injury  Recent Flowsheet Documentation  Taken 6/1/2024 2220 by Roseline Garcia LPN  Skin Protection: skin sealant/moisture barrier applied  Taken 6/1/2024 2100 by Roseline Garcia LPN  Body Position: patient/family refused  Intervention: Prevent and Manage VTE (Venous Thromboembolism) Risk  Recent Flowsheet Documentation  Taken 6/1/2024 2220 by Roseline Garcia LPN  Activity Management: activity encouraged  VTE Prevention/Management: (see MAR) other (see comments)  Range of Motion: active ROM (range of motion) encouraged  Intervention: Prevent Infection  Recent Flowsheet Documentation  Taken 6/1/2024 2220 by Roseline Garcia LPN  Infection Prevention:   rest/sleep promoted   single patient room provided  Goal: Optimal Comfort and Wellbeing  Intervention: Monitor Pain and Promote Comfort  Recent Flowsheet Documentation  Taken 6/1/2024 2220 by Roseline Garcia LPN  Pain Management Interventions:   see MAR   relaxation techniques promoted   quiet environment facilitated  Intervention: Provide Person-Centered Care  Recent Flowsheet Documentation  Taken 6/1/2024 2220 by Radha  ERIN Dukes  Trust Relationship/Rapport: choices provided   Goal Outcome Evaluation:   Pt A&O, pain controlled, pt stable throughout the shift. Pt resting in bed, is able to make needs known, call light in reach, will continue current POC

## 2024-06-02 NOTE — PLAN OF CARE
Problem: Adult Inpatient Plan of Care  Goal: Plan of Care Review  Outcome: Ongoing, Progressing  Goal: Patient-Specific Goal (Individualized)  Outcome: Ongoing, Progressing  Goal: Absence of Hospital-Acquired Illness or Injury  Outcome: Ongoing, Progressing  Intervention: Identify and Manage Fall Risk  Goal: Optimal Comfort and Wellbeing  Outcome: Ongoing, Progressing  Intervention: Provide Person-Centered Care  Goal: Readiness for Transition of Care  Outcome: Ongoing, Progressing   Goal Outcome Evaluation:  Plan of Care Reviewed With: patient        Progress: no change  Outcome Evaluation: Patient is able to reposition in bed with mininmal assistance, does need help turning from side to side. All questions and concerns addressed by staff, no other issues at this time. Will continue to monitor, call light in reach.

## 2024-06-03 LAB
GLUCOSE BLDC GLUCOMTR-MCNC: 146 MG/DL (ref 70–99)
GLUCOSE BLDC GLUCOMTR-MCNC: 63 MG/DL (ref 70–99)
GLUCOSE BLDC GLUCOMTR-MCNC: 93 MG/DL (ref 70–99)
GLUCOSE BLDC GLUCOMTR-MCNC: 95 MG/DL (ref 70–99)
GLUCOSE BLDC GLUCOMTR-MCNC: 96 MG/DL (ref 70–99)

## 2024-06-03 PROCEDURE — 97530 THERAPEUTIC ACTIVITIES: CPT

## 2024-06-03 PROCEDURE — 82948 REAGENT STRIP/BLOOD GLUCOSE: CPT

## 2024-06-03 PROCEDURE — 97168 OT RE-EVAL EST PLAN CARE: CPT

## 2024-06-03 PROCEDURE — 63710000001 INSULIN GLARGINE PER 5 UNITS: Performed by: INTERNAL MEDICINE

## 2024-06-03 PROCEDURE — 82948 REAGENT STRIP/BLOOD GLUCOSE: CPT | Performed by: INTERNAL MEDICINE

## 2024-06-03 PROCEDURE — 97110 THERAPEUTIC EXERCISES: CPT

## 2024-06-03 RX ADMIN — FERROUS SULFATE TAB 325 MG (65 MG ELEMENTAL FE) 325 MG: 325 (65 FE) TAB at 10:26

## 2024-06-03 RX ADMIN — DICLOFENAC SODIUM 4 G: 10 GEL TOPICAL at 20:07

## 2024-06-03 RX ADMIN — LISINOPRIL 2.5 MG: 2.5 TABLET ORAL at 10:26

## 2024-06-03 RX ADMIN — DICLOFENAC SODIUM 4 G: 10 GEL TOPICAL at 14:51

## 2024-06-03 RX ADMIN — SERTRALINE HYDROCHLORIDE 50 MG: 50 TABLET ORAL at 20:02

## 2024-06-03 RX ADMIN — PREGABALIN 100 MG: 100 CAPSULE ORAL at 10:26

## 2024-06-03 RX ADMIN — BACLOFEN 10 MG: 10 TABLET ORAL at 19:53

## 2024-06-03 RX ADMIN — ATORVASTATIN CALCIUM 10 MG: 10 TABLET, FILM COATED ORAL at 20:02

## 2024-06-03 RX ADMIN — FUROSEMIDE 60 MG: 40 TABLET ORAL at 10:26

## 2024-06-03 RX ADMIN — APIXABAN 5 MG: 5 TABLET, FILM COATED ORAL at 20:02

## 2024-06-03 RX ADMIN — APIXABAN 5 MG: 5 TABLET, FILM COATED ORAL at 10:26

## 2024-06-03 RX ADMIN — CYANOCOBALAMIN TAB 500 MCG 1000 MCG: 500 TAB at 10:26

## 2024-06-03 RX ADMIN — ACETAMINOPHEN 650 MG: 325 TABLET ORAL at 01:32

## 2024-06-03 RX ADMIN — Medication 500 MG: at 20:02

## 2024-06-03 RX ADMIN — BACLOFEN 10 MG: 10 TABLET ORAL at 02:59

## 2024-06-03 RX ADMIN — DICLOFENAC SODIUM 4 G: 10 GEL TOPICAL at 02:06

## 2024-06-03 RX ADMIN — Medication 1 APPLICATION: at 10:30

## 2024-06-03 RX ADMIN — ACETAMINOPHEN 650 MG: 325 TABLET ORAL at 18:21

## 2024-06-03 RX ADMIN — PREGABALIN 100 MG: 100 CAPSULE ORAL at 20:02

## 2024-06-03 RX ADMIN — Medication 500 MG: at 10:26

## 2024-06-03 RX ADMIN — INSULIN GLARGINE 35 UNITS: 100 INJECTION, SOLUTION SUBCUTANEOUS at 10:25

## 2024-06-03 RX ADMIN — OXYCODONE AND ACETAMINOPHEN 1 TABLET: 10; 325 TABLET ORAL at 11:24

## 2024-06-03 RX ADMIN — OXYCODONE AND ACETAMINOPHEN 1 TABLET: 10; 325 TABLET ORAL at 19:53

## 2024-06-03 RX ADMIN — PREGABALIN 100 MG: 100 CAPSULE ORAL at 16:25

## 2024-06-03 RX ADMIN — INSULIN GLARGINE 35 UNITS: 100 INJECTION, SOLUTION SUBCUTANEOUS at 20:03

## 2024-06-03 RX ADMIN — EMPAGLIFLOZIN 10 MG: 10 TABLET, FILM COATED ORAL at 10:26

## 2024-06-03 RX ADMIN — IBUPROFEN 400 MG: 400 TABLET ORAL at 02:04

## 2024-06-03 RX ADMIN — CETIRIZINE HYDROCHLORIDE 10 MG: 10 TABLET, FILM COATED ORAL at 10:26

## 2024-06-03 RX ADMIN — OXYCODONE AND ACETAMINOPHEN 1 TABLET: 10; 325 TABLET ORAL at 02:59

## 2024-06-03 RX ADMIN — MONTELUKAST 10 MG: 10 TABLET, FILM COATED ORAL at 20:02

## 2024-06-03 RX ADMIN — Medication 10 MG: at 20:02

## 2024-06-03 RX ADMIN — DICLOFENAC SODIUM 4 G: 10 GEL TOPICAL at 07:42

## 2024-06-03 RX ADMIN — BACLOFEN 10 MG: 10 TABLET ORAL at 11:24

## 2024-06-03 NOTE — THERAPY RE-EVALUATION
SNF - Occupational Therapy Re-Evaluation   [facility]    Patient Name: Jose Shaikh  : 1960    MRN: [id number]                              Today's Date: 6/3/2024       Admit Date: 2023    Visit Dx:     ICD-10-CM ICD-9-CM   1. Difficulty in walking  R26.2 719.7   2. Type 2 diabetes mellitus with other specified complication, unspecified whether long term insulin use  E11.69 250.80     Patient Active Problem List   Diagnosis    Diabetic ulcer of left heel associated with type 2 DM    Acute osteomyelitis of left calcaneus     Diabetic ulcer of left heel associated with type 2 DM    Diabetic ulcer of right midfoot associated with type 2 DM    Paroxysmal atrial fibrillation    Essential hypertension    Hyperlipidemia LDL goal <100    Cellulitis and abscess of foot    High alkaline phosphatase level    Osteomyelitis    Onychomycosis    Onychocryptosis    Foot pain, bilateral    Osteomyelitis of foot, right, acute    Cellulitis of right foot    Type 2 diabetes mellitus, with long-term current use of insulin    Class 3 severe obesity due to excess calories with serious comorbidity and body mass index (BMI) of 45.0 to 49.9 in adult    Anxiety disorder, unspecified    Claustrophobia    Dependence on wheelchair    Depression, unspecified    Long term (current) use of anticoagulants    Long term (current) use of oral hypoglycemic drugs    Wound of foot    Ulcer of right foot    Orthostatic hypotension    Other chronic osteomyelitis, right ankle and foot    Personal history of nicotine dependence    Thrombocytopenia, unspecified    Unspecified open wound, right foot, initial encounter    Diabetic foot infection    Subacute osteomyelitis of right foot    Right foot pain    Sepsis    Onychomycosis    Foot pain, left    Impaired mobility and ADLs    Absence of toe of right foot    Corns and callosity    Disability of walking    Fracture    Limb swelling    Polyneuropathy    Pressure ulcer, stage 1    Shortness of  breath    Generalized weakness    Debility    Ulcerated, foot, left, limited to breakdown of skin    Onychomycosis     Past Medical History:   Diagnosis Date    Absence of toe of right foot     Acute osteomyelitis of left calcaneus  08/18/2021    Anxiety and depression     Arthritis     Cancer     Chronic pain     STATES HIS PAIN IS 10/10 AAT    Claustrophobia     Corns and callus     Diabetic ulcer of left heel associated with type 2 DM 08/18/2021    Diabetic ulcer of left heel associated with type 2 DM 07/06/2021    Diabetic ulcer of right midfoot associated with type 2 DM 08/18/2021    Difficulty walking     Essential hypertension 08/31/2021    Hammertoe     Hyperlipidemia LDL goal <100 08/31/2021    Ingrown toenail     Obesity     Paroxysmal atrial fibrillation 08/31/2021    Polyneuropathy     Pressure ulcer, stage 1     Tinea unguium     Type 2 diabetes mellitus with polyneuropathy      Past Surgical History:   Procedure Laterality Date    CYST REMOVAL      center of back; benign    EYE SURGERY      INCISION AND DRAINAGE ABSCESS      back    INCISION AND DRAINAGE LEG Right 12/10/2021    Procedure: INCISION AND DRAINAGE LOWER EXTREMITY;  Surgeon: Ash Leyva DPM;  Location: [facility] MAIN OR;  Service: Podiatry;  Laterality: Right;    OTHER SURGICAL HISTORY      Surgical clips left foot    TOE SURGERY Right     Removal of 5th toe    TRANS METATARSAL AMPUTATION Right 12/02/2021    Procedure: AMPUTATION TRANS METATARSAL;  Surgeon: Ash Leyva DPM;  Location: [facility] MAIN OR;  Service: Podiatry;  Laterality: Right;    VASCULAR SURGERY      WRIST SURGERY Left     repair of injury      General Information       Row Name 06/03/24 1628 06/03/24 1615       OT Time and Intention    Document Type therapy note (daily note)  -ES re-evaluation  -ES    Mode of Treatment individual therapy;occupational therapy  -ES individual therapy;occupational therapy  -ES      Row Name 06/03/24 1628 06/03/24 1614        General Information    Patient Profile Reviewed yes  -ES yes  -ES    Existing Precautions/Restrictions fall  -ES fall  -ES    Barriers to Rehab none identified  -ES none identified  -ES      Row Name 06/03/24 1628 06/03/24 1615       Cognition    Orientation Status (Cognition) oriented x 3  -ES oriented x 3  -ES      Row Name 06/03/24 1628 06/03/24 1615       Safety Issues, Functional Mobility    Impairments Affecting Function (Mobility) balance;endurance/activity tolerance;range of motion (ROM);strength  -ES balance;endurance/activity tolerance;range of motion (ROM);strength;pain  -ES              User Key  (r) = Recorded By, (t) = Taken By, (c) = Cosigned By      Initials Name Provider Type    ES Katlyn Mantilla, OTR/L, CSRS Occupational Therapist                     Mobility/ADL's       Row Name 06/03/24 1628 06/03/24 1615       Bed Mobility    Bed Mobility supine-sit;sit-supine  -ES supine-sit;sit-supine  -ES    Supine-Sit [place] (Bed Mobility) moderate assist (50% patient effort);1 person assist  -ES moderate assist (50% patient effort);1 person assist  -ES    Sit-Supine [place] (Bed Mobility) moderate assist (50% patient effort);1 person assist  -ES moderate assist (50% patient effort);1 person assist  -ES    Bed Mobility, Safety Issues decreased use of legs for bridging/pushing  -ES decreased use of legs for bridging/pushing  -ES    Assistive Device (Bed Mobility) bed rails;draw sheet;head of bed elevated;overhead trapeze  -ES bed rails;draw sheet;head of bed elevated;overhead trapeze  -ES      Row Name 06/03/24 1628 06/03/24 1615       Transfers    Transfers sit-stand transfer;stand-sit transfer  -ES sit-stand transfer;stand-sit transfer  -ES      Row Name 06/03/24 1628 06/03/24 1615       Sit-Stand Transfer    Sit-Stand [place] (Transfers) moderate assist (50% patient effort);2 person assist;verbal cues  -ES moderate assist (50% patient effort);2 person assist;verbal cues  -ES    Assistive  Device (Sit-Stand Transfers) walker, front-wheeled  -ES walker, front-wheeled  -ES    Comment, (Sit-Stand Transfer) Pt participated in hip hinge theraball rolls to prepare for standing. Pt completed 2 sets x30 center hip hinges, 2 sets x30 left hip hinges, and 2 sets x30 right hip hinges. Pt required cues for trunk flexion.  -ES Pt trialed sit-stand x2 standing for 20 seconds requiring cues for trunk and neck extension.  -ES      Row Name 06/03/24 1628 06/03/24 1615       Stand-Sit Transfer    Stand-Sit [place] (Transfers) moderate assist (50% patient effort);verbal cues;2 person assist  -ES moderate assist (50% patient effort);verbal cues;2 person assist  -ES    Assistive Device (Stand-Sit Transfers) walker, front-wheeled  -ES walker, front-wheeled  -ES      Row Name 06/03/24 1628 06/03/24 1615       Functional Mobility    Functional Mobility- Ind. Level unable to perform  -ES unable to perform  -ES      Row Name 06/03/24 1615          Activities of Daily Living    BADL Assessment/Intervention bathing;upper body dressing;lower body dressing;grooming;toileting  -ES       Row Name 06/03/24 1628 06/03/24 1615       Mobility    Extremity Weight-bearing Status left lower extremity;right lower extremity  -ES left lower extremity;right lower extremity  -ES    Left Lower Extremity (Weight-bearing Status) weight-bearing as tolerated (WBAT)  -ES weight-bearing as tolerated (WBAT)  -ES    Right Lower Extremity (Weight-bearing Status) weight-bearing as tolerated (WBAT)  -ES weight-bearing as tolerated (WBAT)  -ES      Row Name 06/03/24 1615          Bathing Assessment/Intervention    [place] Level (Bathing) bathing skills;moderate assist (50% patient effort)  -ES       Row Name 06/03/24 1615          Upper Body Dressing Assessment/Training    [place] Level (Upper Body Dressing) upper body dressing skills;set up  -ES       Row Name 06/03/24 1615          Lower Body Dressing Assessment/Training    [place]  Level (Lower Body Dressing) lower body dressing skills;dependent (less than 25% patient effort)  -ES       Row Name 06/03/24 1615          Grooming Assessment/Training    [place] Level (Grooming) grooming skills;set up  -ES       Row Name 06/03/24 1615          Toileting Assessment/Training    [place] Level (Toileting) toileting skills;maximum assist (25% patient effort)  -ES               User Key  (r) = Recorded By, (t) = Taken By, (c) = Cosigned By      Initials Name Provider Type    ES Katlyn Mantilla, OTR/L, NADEGES Occupational Therapist                   Obj/Interventions       Row Name 06/03/24 1619          Range of Motion Comprehensive    General Range of Motion bilateral upper extremity ROM WFL  -ES       Row Name 06/03/24 1619          Strength Comprehensive (MMT)    General Manual Muscle Testing (MMT) Assessment no strength deficits identified  -ES       Row Name 06/03/24 1635 06/03/24 1619       Motor Skills    Motor Skills functional endurance  -ES functional endurance  -ES    Functional Endurance F+, pt engaged in theract with good participation demonstrating fatigue at end of session.  -ES --      [facility] Name 06/03/24 1635 06/03/24 1619       Balance    Balance Assessment sitting dynamic balance;standing static balance  -ES sitting dynamic balance;standing static balance  -ES    Dynamic Sitting Balance standby assist;1-person assist  -ES standby assist;1-person assist  -ES    Static Standing Balance moderate assist;2-person assist  -ES moderate assist;2-person assist  -ES    Position/Device Used, Standing Balance walker, front-wheeled  -ES walker, front-wheeled  -ES              User Key  (r) = Recorded By, (t) = Taken By, (c) = Cosigned By      Initials Name Provider Type    ES Katlyn Mantilla, OTR/L, CSRS Occupational Therapist                   Goals/Plan       Row Name 06/03/24 1623          Bed Mobility Goal 1 (OT)    Activity/Assistive Device (Bed Mobility Goal 1, OT) bed mobility activities,  all  -ES     [place] Level/Cues Needed (Bed Mobility Goal 1, OT) standby assist  -ES     Time Frame (Bed Mobility Goal 1, OT) long term goal (LTG);10 days  -ES     Progress/Outcomes (Bed Mobility Goal 1, OT) continuing progress toward goal  -ES       Row Name 06/03/24 1623          Transfer Goal 1 (OT)    Activity/Assistive Device (Transfer Goal 1, OT) transfers, all  -ES     [place] Level/Cues Needed (Transfer Goal 1, OT) minimum assist (75% or more patient effort)  -ES     Time Frame (Transfer Goal 1, OT) long term goal (LTG);10 days  -ES     Progress/Outcome (Transfer Goal 1, OT) continuing progress toward goal  -ES       Row Name 06/03/24 1623          Bathing Goal 1 (OT)    Activity/Device (Bathing Goal 1, OT) bathing skills, all  -ES     [place] Level/Cues Needed (Bathing Goal 1, OT) supervision required  -ES     Time Frame (Bathing Goal 1, OT) long term goal (LTG);10 days  -ES     Progress/Outcomes (Bathing Goal 1, OT) continuing progress toward goal  -ES       Row Name 06/03/24 1623          Dressing Goal 1 (OT)    Activity/Device (Dressing Goal 1, OT) dressing skills, all  -ES     [place]/Cues Needed (Dressing Goal 1, OT) minimum assist (75% or more patient effort)  -ES     Time Frame (Dressing Goal 1, OT) long term goal (LTG);10 days  -ES     Progress/Outcome (Dressing Goal 1, OT) continuing progress toward goal  -ES       Row Name 06/03/24 1623          Toileting Goal 1 (OT)    Activity/Device (Toileting Goal 1, OT) toileting skills, all  -ES     [place] Level/Cues Needed (Toileting Goal 1, OT) minimum assist (75% or more patient effort)  -ES     Time Frame (Toileting Goal 1, OT) long term goal (LTG);10 days  -ES     Progress/Outcome (Toileting Goal 1, OT) continuing progress toward goal  -ES       Row Name 06/03/24 1623          Grooming Goal 1 (OT)    Activity/Device (Grooming Goal 1, OT) grooming skills, all  -ES     [place] (Grooming Goal 1, OT) modified independence  -ES      Time Frame (Grooming Goal 1, OT) long term goal (LTG);30 days  -ES     Progress/Outcome (Grooming Goal 1, OT) continuing progress toward goal  -ES       Row Name 06/03/24 1623          Problem Specific Goal 1 (OT)    Problem Specific Goal 1 (OT) Patient will improve activity tolerance to good to support independence with self-care activity and functional transfers  -ES     Time Frame (Problem Specific Goal 1, OT) long term goal (LTG);10 days  -ES     Progress/Outcome (Problem Specific Goal 1, OT) continuing progress toward goal  -ES       Row Name 06/03/24 1623          Therapy Assessment/Plan (OT)    Planned Therapy Interventions (OT) activity tolerance training;BADL retraining;functional balance retraining;occupation/activity based interventions;patient/caregiver education/training;ROM/therapeutic exercise;strengthening exercise;transfer/mobility retraining  -ES               User Key  (r) = Recorded By, (t) = Taken By, (c) = Cosigned By      Initials Name Provider Type    ES Katlyn Mantilla, OTR/L, CSRS Occupational Therapist                   Clinical Impression       Row Name 06/03/24 1637 06/03/24 1620       Plan of Care Review    Progress -- no change  -ES    Outcome Evaluation Pt displays deficits related to balance, endurance, strength, and range of motion continuing to limit his ability to independently complete ADLs and transfers. Pt engaged hip hinge while rolling theraball completing repetitions to the left, center, and right. Pt displayed increased trunk flexion at end of session after participating in theract. Pt would benefit from continued skilled OT to promote return to baseline.  -ES Pt displays deficits related to balance, endurance, strength, and range of motion continuing to limit his ability to independently complete ADLs and transfers. Pt engaged in sit-stand requiring mod x2 person assist, standing for 20 seconds Pt would benefit from continued skilled OT to promote return to baseline.   -ES      Row Name 06/03/24 1620          Therapy Assessment/Plan (OT)    Rehab Potential (OT) fair, will monitor progress closely  -ES     Criteria for Skilled Therapeutic Interventions Met (OT) yes;meets criteria;skilled treatment is necessary  -ES     Therapy Frequency (OT) 5 times/wk  -ES       Row Name 06/03/24 1620          Therapy Plan Review/Discharge Plan (OT)    Anticipated Discharge Disposition (OT) extended care facility  -ES       Row Name 06/03/24 1637 06/03/24 1620       Positioning and Restraints    Pre-Treatment Position in bed  -ES in bed  -ES    Post Treatment Position bed  -ES bed  -ES    In Bed supine;call light within reach;encouraged to call for assist  -ES supine;call light within reach;encouraged to call for assist  -ES              User Key  (r) = Recorded By, (t) = Taken By, (c) = Cosigned By      Initials Name Provider Type    Katlyn Gutierres, OTR/L, CSRS Occupational Therapist                   Outcome Measures       Row Name 06/03/24 1639 06/03/24 1627       How much help from another is currently needed...    Putting on and taking off regular lower body clothing? 1  -ES 1  -ES    Bathing (including washing, rinsing, and drying) 2  -ES 2  -ES    Toileting (which includes using toilet bed pan or urinal) 2  -ES 2  -ES    Putting on and taking off regular upper body clothing 3  -ES 3  -ES    Taking care of personal grooming (such as brushing teeth) 3  -ES 3  -ES    Eating meals 4  -ES 4  -ES    AM-PAC 6 Clicks Score (OT) 15  -ES 15  -ES      Row Name 06/03/24 1416 06/03/24 0900       How much help from another person do you currently need...    Turning from your back to your side while in flat bed without using bedrails? 4  -WM 3  -MA    Moving from lying on back to sitting on the side of a flat bed without bedrails? 3  -WM 3  -MA    Moving to and from a bed to a chair (including a wheelchair)? 2  -WM 2  -MA    Standing up from a chair using your arms (e.g., wheelchair, bedside chair)? 2   -WM 2  -MD    Climbing 3-5 steps with a railing? 1  -WM 1  -MD    To walk in hospital room? 2  -WM 2  -MD    AM-PAC 6 Clicks Score (PT) 14  -WM 13  -MD    Highest Level of Mobility Goal 4 --> Transfer to chair/commode  -WM 4 --> Transfer to chair/commode  -MD      Row Name 06/03/24 1639 06/03/24 1627       Functional Assessment    Outcome Measure Options AM-PAC 6 Clicks Daily Activity (OT)  -ES AM-PAC 6 Clicks Daily Activity (OT)  -ES              User Key  (r) = Recorded By, (t) = Taken By, (c) = Cosigned By      Initials Name Provider Type    MD Nicole Kahn, RN Registered Nurse    Carson Johnson PTA Physical Therapist Assistant    Katlyn Gutierres, OTR/L, CSRS Occupational Therapist                  Section G              Section GG                         Occupational Therapy Education       Title: PT OT SLP Therapies (Done)       Topic: Occupational Therapy (Done)       Point: ADL training (Done)       Description:   Instruct learner(s) on proper safety adaptation and remediation techniques during self care or transfers.   Instruct in proper use of assistive devices.                  Learning Progress Summary             Patient Acceptance, E,TB, VU by RM at 5/28/2024 0439    Acceptance, E,D, DU by PG at 4/24/2024 1039    Acceptance, E,TB, VU by RM at 2/14/2024 0518    Acceptance, E,TB, VU by RM at 1/9/2024 0420    Acceptance, E, VU by CR at 12/30/2023 1750    Acceptance, E,TB, VU by RM at 11/30/2023 0420    Acceptance, E,D, DU by PG at 11/7/2023 0932                         Point: Home exercise program (Done)       Description:   Instruct learner(s) on appropriate technique for monitoring, assisting and/or progressing therapeutic exercises/activities.                  Learning Progress Summary             Patient Acceptance, E,TB, VU by RM at 5/28/2024 0439    Acceptance, E,D, DU by PG at 4/24/2024 1039    Acceptance, E,TB, VU by RM at 2/14/2024 0518    Acceptance, E,TB, VU by RM at 1/9/2024 0420     Acceptance, E, VU by CR at 12/30/2023 1750    Acceptance, E,TB, VU by RM at 11/30/2023 0420    Acceptance, E,D, DU by PG at 11/7/2023 0932                         Point: Precautions (Done)       Description:   Instruct learner(s) on prescribed precautions during self-care and functional transfers.                  Learning Progress Summary             Patient Acceptance, E,TB, VU by RM at 5/28/2024 0439    Acceptance, E,D, DU by PG at 4/24/2024 1039    Acceptance, E,TB, VU by RM at 2/14/2024 0518    Acceptance, E,TB, VU by RM at 1/9/2024 0420    Acceptance, E, VU by CR at 12/30/2023 1750    Acceptance, E,TB, VU by RM at 11/30/2023 0420    Acceptance, E,D, DU by PG at 11/7/2023 0932                         Point: Body mechanics (Done)       Description:   Instruct learner(s) on proper positioning and spine alignment during self-care, functional mobility activities and/or exercises.                  Learning Progress Summary             Patient Acceptance, E,TB, VU by RM at 5/28/2024 0439    Acceptance, E,D, DU by PG at 4/24/2024 1039    Acceptance, E,TB, VU by RM at 2/14/2024 0518    Acceptance, E,TB, VU by RM at 1/9/2024 0420    Acceptance, E, VU by CR at 12/30/2023 1750    Acceptance, E,TB, VU by RM at 11/30/2023 0420    Acceptance, E,D, DU by PG at 11/7/2023 0932                                         User Key       Initials Effective Dates Name Provider Type Discipline     06/08/21 -  Bharath Berg, RN Registered Nurse Nurse    CR 06/13/22 -  Adilson Baker LPN Licensed Nurse Nurse    PG 06/16/21 -  [place] OT Occupational Therapist OT                  OT Recommendation and Plan  Planned Therapy Interventions (OT): activity tolerance training, BADL retraining, functional balance retraining, occupation/activity based interventions, patient/caregiver education/training, ROM/therapeutic exercise, strengthening exercise, transfer/mobility retraining  Therapy Frequency (OT): 5 times/wk  Plan of Care  Review  Progress: no change  Outcome Evaluation: Pt displays deficits related to balance, endurance, strength, and range of motion continuing to limit his ability to independently complete ADLs and transfers. Pt engaged hip hinge while rolling theraball completing repetitions to the left, center, and right. Pt displayed increased trunk flexion at end of session after participating in theract. Pt would benefit from continued skilled OT to promote return to baseline.     Time Calculation:         Time Calculation- OT       Row Name 06/03/24 1641             Time Calculation- OT    OT Received On 06/03/24  -ES      OT Goal Re-Cert Due Date 06/12/24  -ES         Timed Charges    75149 - OT Therapeutic Activity Minutes 41  -ES         Untimed Charges    OT Eval/Re-eval Minutes 30  -ES         SNF Occupational Therapy Minutes    Skilled Minutes- OT 41 min  -ES         Total Minutes    Timed Charges Total Minutes 41  -ES      Untimed Charges Total Minutes 30  -ES       Total Minutes 71  -ES                User Key  (r) = Recorded By, (t) = Taken By, (c) = Cosigned By      Initials Name Provider Type    ES Katlyn Mantilla, OTR/L, CSRS Occupational Therapist                  Therapy Charges for Today       Code Description Service Date Service Provider Modifiers Qty    [id number]  OT THERAPEUTIC ACT EA 15 MIN 6/3/2024 Katlyn Mantilla OTR/L, CSRS GO 3    [id number] HC OT RE-EVAL 2 6/3/2024 Katlyn Mantilla OTR/L, CSRS GO 1                 Katlyn Mantilla OTMICHEL/L, CSRS  6/3/2024

## 2024-06-03 NOTE — PLAN OF CARE
Goal Outcome Evaluation:  Plan of Care Reviewed With: patient        Progress: no change  Outcome Evaluation: Patient alert and oriented. Given PRN Percocet and Baclofen at 1124 for left hip pain rated 7.5/10. Med effective. No c/o nausea reported this shift. Blood glucose at lunch was 63 (checked twice). Patient c/o feeling shaky. Given orange sherbet and orange juice to bring blood glucose up to 96. Symptoms relieved. Voices needs. Con't current POC.

## 2024-06-03 NOTE — THERAPY TREATMENT NOTE
Handoff Report from Operating Room to PACU    Report received from MARKELL Olguin and Mariusz Flood CRNA regarding Lavern Baird. Surgeon(s): Latasha Huntley MD  And Procedure(s) (LRB):  LEFT BELOW KNEE AMPUTATION  (Left)  confirmed   with allergies, drains and dressings discussed. Anesthesia type, drugs, patient history, complications, estimated blood loss, vital signs, intake and output, and last pain medication, lines, reversal medications and temperature were reviewed. [facility] - Physical Therapy Treatment Note  KE[facility] Dominguez     Patient Name: Jose Shaikh  : 1960  MRN: [id number]  Today's Date: 6/3/2024      Visit Dx:    ICD-10-CM ICD-9-CM   1. Difficulty in walking  R26.2 719.7   2. Type 2 diabetes mellitus with other specified complication, unspecified whether long term insulin use  E11.69 250.80     Patient Active Problem List   Diagnosis    Diabetic ulcer of left heel associated with type 2 DM    Acute osteomyelitis of left calcaneus     Diabetic ulcer of left heel associated with type 2 DM    Diabetic ulcer of right midfoot associated with type 2 DM    Paroxysmal atrial fibrillation    Essential hypertension    Hyperlipidemia LDL goal <100    Cellulitis and abscess of foot    High alkaline phosphatase level    Osteomyelitis    Onychomycosis    Onychocryptosis    Foot pain, bilateral    Osteomyelitis of foot, right, acute    Cellulitis of right foot    Type 2 diabetes mellitus, with long-term current use of insulin    Class 3 severe obesity due to excess calories with serious comorbidity and body mass index (BMI) of 45.0 to 49.9 in adult    Anxiety disorder, unspecified    Claustrophobia    Dependence on wheelchair    Depression, unspecified    Long term (current) use of anticoagulants    Long term (current) use of oral hypoglycemic drugs    Wound of foot    Ulcer of right foot    Orthostatic hypotension    Other chronic osteomyelitis, right ankle and foot    Personal history of nicotine dependence    Thrombocytopenia, unspecified    Unspecified open wound, right foot, initial encounter    Diabetic foot infection    Subacute osteomyelitis of right foot    Right foot pain    Sepsis    Onychomycosis    Foot pain, left    Impaired mobility and ADLs    Absence of toe of right foot    Corns and callosity    Disability of walking    Fracture    Limb swelling    Polyneuropathy    Pressure ulcer, stage 1    Shortness of breath    Generalized weakness    Debility    Ulcerated, foot,  left, limited to breakdown of skin    Onychomycosis     Past Medical History:   Diagnosis Date    Absence of toe of right foot     Acute osteomyelitis of left calcaneus  08/18/2021    Anxiety and depression     Arthritis     Cancer     Chronic pain     STATES HIS PAIN IS 10/10 AAT    Claustrophobia     Corns and callus     Diabetic ulcer of left heel associated with type 2 DM 08/18/2021    Diabetic ulcer of left heel associated with type 2 DM 07/06/2021    Diabetic ulcer of right midfoot associated with type 2 DM 08/18/2021    Difficulty walking     Essential hypertension 08/31/2021    Hammertoe     Hyperlipidemia LDL goal <100 08/31/2021    Ingrown toenail     Obesity     Paroxysmal atrial fibrillation 08/31/2021    Polyneuropathy     Pressure ulcer, stage 1     Tinea unguium     Type 2 diabetes mellitus with polyneuropathy      Past Surgical History:   Procedure Laterality Date    CYST REMOVAL      center of back; benign    EYE SURGERY      INCISION AND DRAINAGE ABSCESS      back    INCISION AND DRAINAGE LEG Right 12/10/2021    Procedure: INCISION AND DRAINAGE LOWER EXTREMITY;  Surgeon: Ash Leyva DPM;  Location: [facility];  Service: Podiatry;  Laterality: Right;    OTHER SURGICAL HISTORY      Surgical clips left foot    TOE SURGERY Right     Removal of 5th toe    TRANS METATARSAL AMPUTATION Right 12/02/2021    Procedure: AMPUTATION TRANS METATARSAL;  Surgeon: sAh Leyva DPM;  Location: [facility];  Service: Podiatry;  Laterality: Right;    VASCULAR SURGERY      WRIST SURGERY Left     repair of injury       PT Assessment (Last 12 Hours)       PT Evaluation and Treatment       Row Name 06/03/24 6815          Physical Therapy Time and Intention    Document Type therapy note (daily note)  -WM     Mode of Treatment individual therapy;physical therapy  -WM     Patient Effort adequate  -WM     Symptoms Noted During/After Treatment fatigue  -WM       Row Name 06/03/24 0964          Pain  Scale: FACES Pre/Post-Treatment    Pain: FACES Scale, Pretreatment 2-->hurts little bit  -WM     Posttreatment Pain Rating 6-->hurts even more  -     Pain Location - Side/Orientation Left  -     Pain Location - hip  -       Row Name 06/03/24 1409          Bed Mobility    Supine-Sit-Supine [place] (Bed Mobility) minimum assist (75% patient effort);moderate assist (50% patient effort);verbal cues  -     Bed Mobility, Safety Issues decreased use of legs for bridging/pushing  -     Assistive Device (Bed Mobility) bed rails;draw sheet;head of bed elevated;overhead trapeze  -       Row Name 06/03/24 1409          Sit-Stand Transfer    Sit-Stand [place] (Transfers) moderate assist (50% patient effort);verbal cues;2 person assist  -     Assistive Device (Sit-Stand Transfers) bariatric;walker, front-wheeled  -       Row Name 06/03/24 1409          Stand-Sit Transfer    Stand-Sit [place] (Transfers) moderate assist (50% patient effort);verbal cues;2 person assist  -     Assistive Device (Stand-Sit Transfers) bariatric;walker, front-wheeled  -       Row Name 06/03/24 1409          Safety Issues, Functional Mobility    Impairments Affecting Function (Mobility) balance;endurance/activity tolerance;pain;range of motion (ROM);strength  -       Row Name 06/03/24 1409          Hip (Therapeutic Exercise)    Hip AAROM (Therapeutic Exercise) bilateral;aBduction;aDduction;supine;10 repetitions;5 repetitions;2 sets  -     Hip Isometrics (Therapeutic Exercise) bilateral;gluteal sets;supine;10 repetitions;5 repetitions;3 second hold;2 sets  -     Hip Strengthening (Therapeutic Exercise) bilateral;heel slides;supine;30 repititions  Manual resistance  -       Row Name 06/03/24 1409          Knee (Therapeutic Exercise)    Knee Isometrics (Therapeutic Exercise) bilateral;quad sets;supine;10 repetitions;5 repetitions;3 second hold;2 sets  -     Knee Strengthening (Therapeutic Exercise) bilateral;SAQ  (short arc quad);supine;4 lb free weight;30 repititions  -WM       Row Name 06/03/24 1409          Ankle (Therapeutic Exercise)    Ankle AROM (Therapeutic Exercise) bilateral;dorsiflexion;plantarflexion;supine;30 repititions  -WM       Row Name             Wound 03/27/24 1045 Right anterior Skin Tear    Wound - Properties Group Placement Date: 03/27/24  -CR Placement Time: 1045  -CR Side: Right  -CR Orientation: anterior  -CR Primary Wound Type: Skin tear  -CR    Retired Wound - Properties Group Placement Date: 03/27/24  -CR Placement Time: 1045  -CR Side: Right  -CR Orientation: anterior  -CR Primary Wound Type: Skin tear  -CR    Retired Wound - Properties Group Date first assessed: 03/27/24  -CR Time first assessed: 1045  -CR Side: Right  -CR Primary Wound Type: Skin tear  -CR      Row Name             Wound 05/02/24 1033 Left posterior plantar    Wound - Properties Group Placement Date: 05/02/24  -KR Placement Time: 1033  -KR Side: Left  -KR Orientation: posterior  -KR Location: plantar  -KR    Retired Wound - Properties Group Placement Date: 05/02/24  -KR Placement Time: 1033  -KR Side: Left  -KR Orientation: posterior  -KR Location: plantar  -KR    Retired Wound - Properties Group Date first assessed: 05/02/24  -KR Time first assessed: 1033  -KR Side: Left  -KR Location: plantar  -KR      Row Name 06/03/24 1409          Positioning and Restraints    Pre-Treatment Position in bed  -WM     Post Treatment Position bed  -WM     In Bed fowlers;call light within reach;encouraged to call for assist  -       Row Name 06/03/24 1409          Progress Summary (PT)    Progress Toward Functional Goals (PT) progress toward functional goals is gradual  -WM               User Key  (r) = Recorded By, (t) = Taken By, (c) = Cosigned By      Initials Name Provider Type    Adilson Carmona LPN Licensed Nurse    Carson Johnson PTA Physical Therapist Assistant    Almaz Morales, RN Registered Nurse                   Section G              Section GG                       Physical Therapy Education       Title: PT OT SLP Therapies (Done)       Topic: Physical Therapy (Done)       Point: Mobility training (Done)       Learning Progress Summary             Patient Acceptance, E,TB, VU by RM at 5/28/2024 0439    Acceptance, E,D, DU by PG at 4/24/2024 1039    Acceptance, E,TB, VU by RM at 2/14/2024 0518    Acceptance, E,TB, VU by RM at 1/9/2024 0420    Acceptance, E, VU by CR at 12/30/2023 1750    Acceptance, E, VU by CR at 12/20/2023 1345    Acceptance, E, VU by CR at 12/19/2023 1004    Acceptance, E,TB, VU by RM at 11/30/2023 0420    Acceptance, E,TB, VU by MOE at 11/8/2023 1341                         Point: Precautions (Done)       Learning Progress Summary             Patient Acceptance, E,TB, VU by RM at 5/28/2024 0439    Acceptance, E,D, DU by PG at 4/24/2024 1039    Acceptance, E,TB, VU by RM at 2/14/2024 0518    Acceptance, E,TB, VU by RM at 1/9/2024 0420    Acceptance, E, VU by CR at 12/30/2023 1750    Acceptance, E,TB, VU by RM at 11/30/2023 0420    Acceptance, E,TB, VU by MOE at 11/8/2023 1341                                         User Key       Initials Effective Dates Name Provider Type Discipline     06/08/21 -  Bharath Berg RN Registered Nurse Nurse    CR 06/13/22 -  Adilson Baker LPN Licensed Nurse Nurse    PG 06/16/21 -  Kodak Matos OT Occupational Therapist OT    MOE 06/03/21 -  Merlin Rao PT Physical Therapist PT                  PT Recommendation and Plan     Progress Summary (PT)  Progress Toward Functional Goals (PT): progress toward functional goals is gradual  Daily Progress Summary (PT): Pt participated in BLE exercises in supine. He declined transfers and gait training stating he will attempt tomorrow.   Outcome Measures       Row Name 06/03/24 1416 06/01/24 1600          How much help from another person do you currently need...    Turning from your back to your side while in flat bed  without using bedrails? 4  -WM 4  -CS     Moving from lying on back to sitting on the side of a flat bed without bedrails? 3  -WM 3  -CS     Moving to and from a bed to a chair (including a wheelchair)? 2  -WM 2  -CS     Standing up from a chair using your arms (e.g., wheelchair, bedside chair)? 2  -WM 2  -CS     Climbing 3-5 steps with a railing? 1  -WM 1  -CS     To walk in hospital room? 2  -WM 2  -CS     AM-PAC 6 Clicks Score (PT) 14  -WM 14  -CS     Highest Level of Mobility Goal 4 --> Transfer to chair/commode  -WM 4 --> Transfer to chair/commode  -CS        Functional Assessment    Outcome Measure Options -- AM-PAC 6 Clicks Basic Mobility (PT)  -CS               User Key  (r) = Recorded By, (t) = Taken By, (c) = Cosigned By      Initials Name Provider Type     Carson Inman PTA Physical Therapist Assistant    Ronald Huerta PTA Physical Therapist Assistant                     Time Calculation:    PT Charges       Row Name 06/03/24 1407             Time Calculation    PT Received On 06/03/24  -WM         Timed Charges    71043 - PT Therapeutic Exercise Minutes 18  -WM      99021 - PT Therapeutic Activity Minutes 12  -WM         SNF Physical Therapy Minutes    Skilled Minutes- PT 30 min  -WM         Total Minutes    Timed Charges Total Minutes 30  -WM       Total Minutes 30  -WM                User Key  (r) = Recorded By, (t) = Taken By, (c) = Cosigned By      Initials Name Provider Type     Carson Inman PTA Physical Therapist Assistant                      PT G-Codes  Outcome Measure Options: AM-PAC 6 Clicks Basic Mobility (PT)  AM-PAC 6 Clicks Score (PT): 14  AM-PAC 6 Clicks Score (OT): 14    Carson Inman PTA  6/3/2024

## 2024-06-03 NOTE — PLAN OF CARE
Goal Outcome Evaluation:  Plan of Care Reviewed With: patient EMMANUELLE. AAOX4, continues to have chronic pain in his L hip,back and L shoulder. Pain medications decrease pain to a tolerable level. Patient is upset that he can't have hip surgery here. He appears more depressed than usual. . Blood glucose remains stable. No new problems noted. Makes needs known to staff. Call light and personal items within reach at bedside.

## 2024-06-03 NOTE — PLAN OF CARE
Goal Outcome Evaluation:           Progress: no change  Outcome Evaluation: Pt displays deficits related to balance, endurance, strength, and range of motion continuing to limit his ability to independently complete ADLs and transfers. Pt engaged hip hinge while rolling theraball completing repetitions to the left, center, and right. Pt displayed increased trunk flexion at end of session after participating in theract. Pt would benefit from continued skilled OT to promote return to baseline.      Anticipated Discharge Disposition (OT): extended care facility

## 2024-06-04 LAB
GLUCOSE BLDC GLUCOMTR-MCNC: 112 MG/DL (ref 70–99)
GLUCOSE BLDC GLUCOMTR-MCNC: 143 MG/DL (ref 70–99)
GLUCOSE BLDC GLUCOMTR-MCNC: 158 MG/DL (ref 70–99)
GLUCOSE BLDC GLUCOMTR-MCNC: 161 MG/DL (ref 70–99)

## 2024-06-04 PROCEDURE — 63710000001 INSULIN GLARGINE PER 5 UNITS: Performed by: INTERNAL MEDICINE

## 2024-06-04 PROCEDURE — 97110 THERAPEUTIC EXERCISES: CPT

## 2024-06-04 PROCEDURE — 82948 REAGENT STRIP/BLOOD GLUCOSE: CPT | Performed by: INTERNAL MEDICINE

## 2024-06-04 PROCEDURE — 97530 THERAPEUTIC ACTIVITIES: CPT

## 2024-06-04 PROCEDURE — 82948 REAGENT STRIP/BLOOD GLUCOSE: CPT

## 2024-06-04 PROCEDURE — 63710000001 INSULIN LISPRO (HUMAN) PER 5 UNITS: Performed by: PHYSICIAN ASSISTANT

## 2024-06-04 RX ADMIN — APIXABAN 5 MG: 5 TABLET, FILM COATED ORAL at 22:26

## 2024-06-04 RX ADMIN — DICLOFENAC SODIUM 4 G: 10 GEL TOPICAL at 14:44

## 2024-06-04 RX ADMIN — BACLOFEN 10 MG: 10 TABLET ORAL at 20:38

## 2024-06-04 RX ADMIN — FUROSEMIDE 60 MG: 40 TABLET ORAL at 09:52

## 2024-06-04 RX ADMIN — DICLOFENAC SODIUM 4 G: 10 GEL TOPICAL at 04:31

## 2024-06-04 RX ADMIN — Medication 500 MG: at 22:25

## 2024-06-04 RX ADMIN — FERROUS SULFATE TAB 325 MG (65 MG ELEMENTAL FE) 325 MG: 325 (65 FE) TAB at 07:53

## 2024-06-04 RX ADMIN — Medication 500 MG: at 09:52

## 2024-06-04 RX ADMIN — BACLOFEN 10 MG: 10 TABLET ORAL at 04:31

## 2024-06-04 RX ADMIN — ACETAMINOPHEN 650 MG: 325 TABLET ORAL at 07:53

## 2024-06-04 RX ADMIN — ATORVASTATIN CALCIUM 10 MG: 10 TABLET, FILM COATED ORAL at 22:25

## 2024-06-04 RX ADMIN — DICLOFENAC SODIUM 4 G: 10 GEL TOPICAL at 22:26

## 2024-06-04 RX ADMIN — PREGABALIN 100 MG: 100 CAPSULE ORAL at 09:52

## 2024-06-04 RX ADMIN — APIXABAN 5 MG: 5 TABLET, FILM COATED ORAL at 09:52

## 2024-06-04 RX ADMIN — ACETAMINOPHEN 650 MG: 325 TABLET ORAL at 02:26

## 2024-06-04 RX ADMIN — INSULIN LISPRO 3 UNITS: 100 INJECTION, SOLUTION INTRAVENOUS; SUBCUTANEOUS at 17:56

## 2024-06-04 RX ADMIN — PREGABALIN 100 MG: 100 CAPSULE ORAL at 16:48

## 2024-06-04 RX ADMIN — EMPAGLIFLOZIN 10 MG: 10 TABLET, FILM COATED ORAL at 09:52

## 2024-06-04 RX ADMIN — PREGABALIN 100 MG: 100 CAPSULE ORAL at 22:25

## 2024-06-04 RX ADMIN — OXYCODONE AND ACETAMINOPHEN 1 TABLET: 10; 325 TABLET ORAL at 12:39

## 2024-06-04 RX ADMIN — LISINOPRIL 2.5 MG: 2.5 TABLET ORAL at 09:52

## 2024-06-04 RX ADMIN — INSULIN LISPRO 3 UNITS: 100 INJECTION, SOLUTION INTRAVENOUS; SUBCUTANEOUS at 12:07

## 2024-06-04 RX ADMIN — BACLOFEN 10 MG: 10 TABLET ORAL at 12:39

## 2024-06-04 RX ADMIN — OXYCODONE AND ACETAMINOPHEN 1 TABLET: 10; 325 TABLET ORAL at 04:33

## 2024-06-04 RX ADMIN — Medication 10 MG: at 22:25

## 2024-06-04 RX ADMIN — DICLOFENAC SODIUM 4 G: 10 GEL TOPICAL at 07:53

## 2024-06-04 RX ADMIN — INSULIN GLARGINE 25 UNITS: 100 INJECTION, SOLUTION SUBCUTANEOUS at 22:24

## 2024-06-04 RX ADMIN — INSULIN GLARGINE 35 UNITS: 100 INJECTION, SOLUTION SUBCUTANEOUS at 09:52

## 2024-06-04 RX ADMIN — SERTRALINE HYDROCHLORIDE 50 MG: 50 TABLET ORAL at 22:26

## 2024-06-04 RX ADMIN — MONTELUKAST 10 MG: 10 TABLET, FILM COATED ORAL at 22:26

## 2024-06-04 RX ADMIN — CETIRIZINE HYDROCHLORIDE 10 MG: 10 TABLET, FILM COATED ORAL at 09:52

## 2024-06-04 RX ADMIN — OXYCODONE AND ACETAMINOPHEN 1 TABLET: 10; 325 TABLET ORAL at 20:38

## 2024-06-04 RX ADMIN — CYANOCOBALAMIN TAB 500 MCG 1000 MCG: 500 TAB at 09:52

## 2024-06-04 NOTE — PLAN OF CARE
Goal Outcome Evaluation:   Alert and oriented and pleasant with staff. X2-3Max assist for transfers and ambulation. X2 admin PRN pain medication with relief of symptoms noted. Shows improved mobility, this shift, with assistance from staff. New order to decrease LANTUS order from 35 units to 25 units BID, received this shift. Sitting up in bed, call light in reach.

## 2024-06-04 NOTE — THERAPY TREATMENT NOTE
SNF - Occupational Therapy Treatment Note  [facility] Dominguez    Patient Name: Jose Shaikh  : 1960    MRN: [id number]                              Today's Date: 2024       Admit Date: 2023    Visit Dx:     ICD-10-CM ICD-9-CM   1. Difficulty in walking  R26.2 719.7   2. Type 2 diabetes mellitus with other specified complication, unspecified whether long term insulin use  E11.69 250.80     Patient Active Problem List   Diagnosis    Diabetic ulcer of left heel associated with type 2 DM    Acute osteomyelitis of left calcaneus     Diabetic ulcer of left heel associated with type 2 DM    Diabetic ulcer of right midfoot associated with type 2 DM    Paroxysmal atrial fibrillation    Essential hypertension    Hyperlipidemia LDL goal <100    Cellulitis and abscess of foot    High alkaline phosphatase level    Osteomyelitis    Onychomycosis    Onychocryptosis    Foot pain, bilateral    Osteomyelitis of foot, right, acute    Cellulitis of right foot    Type 2 diabetes mellitus, with long-term current use of insulin    Class 3 severe obesity due to excess calories with serious comorbidity and body mass index (BMI) of 45.0 to 49.9 in adult    Anxiety disorder, unspecified    Claustrophobia    Dependence on wheelchair    Depression, unspecified    Long term (current) use of anticoagulants    Long term (current) use of oral hypoglycemic drugs    Wound of foot    Ulcer of right foot    Orthostatic hypotension    Other chronic osteomyelitis, right ankle and foot    Personal history of nicotine dependence    Thrombocytopenia, unspecified    Unspecified open wound, right foot, initial encounter    Diabetic foot infection    Subacute osteomyelitis of right foot    Right foot pain    Sepsis    Onychomycosis    Foot pain, left    Impaired mobility and ADLs    Absence of toe of right foot    Corns and callosity    Disability of walking    Fracture    Limb swelling    Polyneuropathy    Pressure ulcer, stage 1    Shortness of  breath    Generalized weakness    Debility    Ulcerated, foot, left, limited to breakdown of skin    Onychomycosis     Past Medical History:   Diagnosis Date    Absence of toe of right foot     Acute osteomyelitis of left calcaneus  08/18/2021    Anxiety and depression     Arthritis     Cancer     Chronic pain     STATES HIS PAIN IS 10/10 AAT    Claustrophobia     Corns and callus     Diabetic ulcer of left heel associated with type 2 DM 08/18/2021    Diabetic ulcer of left heel associated with type 2 DM 07/06/2021    Diabetic ulcer of right midfoot associated with type 2 DM 08/18/2021    Difficulty walking     Essential hypertension 08/31/2021    Hammertoe     Hyperlipidemia LDL goal <100 08/31/2021    Ingrown toenail     Obesity     Paroxysmal atrial fibrillation 08/31/2021    Polyneuropathy     Pressure ulcer, stage 1     Tinea unguium     Type 2 diabetes mellitus with polyneuropathy      Past Surgical History:   Procedure Laterality Date    CYST REMOVAL      center of back; benign    EYE SURGERY      INCISION AND DRAINAGE ABSCESS      back    INCISION AND DRAINAGE LEG Right 12/10/2021    Procedure: INCISION AND DRAINAGE LOWER EXTREMITY;  Surgeon: Ash Leyva DPM;  Location: [facility] MAIN OR;  Service: Podiatry;  Laterality: Right;    OTHER SURGICAL HISTORY      Surgical clips left foot    TOE SURGERY Right     Removal of 5th toe    TRANS METATARSAL AMPUTATION Right 12/02/2021    Procedure: AMPUTATION TRANS METATARSAL;  Surgeon: Ash Leyva DPM;  Location: [facility] MAIN OR;  Service: Podiatry;  Laterality: Right;    VASCULAR SURGERY      WRIST SURGERY Left     repair of injury      General Information       Row Name 06/04/24 1400          OT Time and Intention    Document Type therapy note (daily note)  -GC     Mode of Treatment individual therapy;occupational therapy  -GC       Row Name 06/04/24 1400          General Information    Patient Profile Reviewed yes  -GC     Existing  Precautions/Restrictions fall  -       Row Name 06/04/24 1400          Safety Issues, Functional Mobility    Impairments Affecting Function (Mobility) balance;endurance/activity tolerance;range of motion (ROM);strength  -               User Key  (r) = Recorded By, (t) = Taken By, (c) = Cosigned By      Initials Name Provider Type     Rosalva Irvin OT Occupational Therapist                     Mobility/ADL's       Row Name 06/04/24 1400          Bed Mobility    Bed Mobility bed mobility (all) activities  -     All Activities, [place] (Bed Mobility) minimum assist (75% patient effort)  -     Scooting/Bridging [place] (Bed Mobility) moderate assist (50% patient effort);2 person assist  -     Supine-Sit [place] (Bed Mobility) moderate assist (50% patient effort);2 person assist  -     Sit-Supine [place] (Bed Mobility) moderate assist (50% patient effort);2 person assist  -     Supine-Sit-Supine [place] (Bed Mobility) moderate assist (50% patient effort);verbal cues;2 person assist  -     Assistive Device (Bed Mobility) bed rails;draw sheet;head of bed elevated;overhead trapeze  -       Row Name 06/04/24 1400          Transfers    Transfers sit-stand transfer;stand-sit transfer  -       Row Name 06/04/24 1400          Sit-Stand Transfer    Sit-Stand [place] (Transfers) moderate assist (50% patient effort);2 person assist;verbal cues;maximum assist (25% patient effort)  -     Assistive Device (Sit-Stand Transfers) walker, front-wheeled  -       Row Name 06/04/24 1400          Stand-Sit Transfer    Stand-Sit [place] (Transfers) moderate assist (50% patient effort);verbal cues;2 person assist;maximum assist (25% patient effort)  -     Assistive Device (Stand-Sit Transfers) walker, front-wheeled  -               User Key  (r) = Recorded By, (t) = Taken By, (c) = Cosigned By      Initials Name Provider Type    Rosalva Campbell OT Occupational Therapist                    Obj/Interventions       Row Name 06/04/24 1401          Motor Skills    Functional Endurance Omnicycle sitting on EOB x10 minutes with several rest breaks and leaning to the R  -GC     Therapeutic Exercise aerobic  -               User Key  (r) = Recorded By, (t) = Taken By, (c) = Cosigned By      Initials Name Provider Type    Rosalva Campbell OT Occupational Therapist                   Goals/Plan    No documentation.                  Clinical Impression       Row Name 06/04/24 1402          Pain Scale: FACES Pre/Post-Treatment    Pain Location - hip;knee  -GC     Pre/Posttreatment Pain Comment Patient had pain medicine prior to tx session  -GC       Row Name 06/04/24 1402          Plan of Care Review    Plan of Care Reviewed With patient  -     Outcome Evaluation No signficant changes.  Pt. did participate and engage with pain being his biggest barrier to independence in hips and knees.  Plan to continue POC established with LTGs.  -       Row Name 06/04/24 1402          Therapy Plan Review/Discharge Plan (OT)    Anticipated Discharge Disposition (OT) extended care facility  -               User Key  (r) = Recorded By, (t) = Taken By, (c) = Cosigned By      Initials Name Provider Type    Rosalva Campbell OT Occupational Therapist                   Outcome Measures       Row Name 06/04/24 1404          How much help from another is currently needed...    Putting on and taking off regular lower body clothing? 1  -GC     Bathing (including washing, rinsing, and drying) 2  -GC     Toileting (which includes using toilet bed pan or urinal) 2  -GC     Putting on and taking off regular upper body clothing 3  -GC     Taking care of personal grooming (such as brushing teeth) 3  -GC     Eating meals 4  -GC     AM-PAC 6 Clicks Score (OT) 15  -GC       Row Name 06/04/24 1404          Functional Assessment    Outcome Measure Options AM-PAC 6 Clicks Daily Activity (OT);Optimal Instrument  -       Row Name  06/04/24 1404          Optimal Instrument    Optimal Instrument Optimal - 3  -GC     Bending/Stooping 4  -GC     Standing 3  -GC     Reaching 2  -GC               User Key  (r) = Recorded By, (t) = Taken By, (c) = Cosigned By      Initials Name Provider Type    Rosalva Campbell OT Occupational Therapist                  Section G              Section GG                         Occupational Therapy Education       Title: PT OT SLP Therapies (Done)       Topic: Occupational Therapy (Done)       Point: ADL training (Done)       Description:   Instruct learner(s) on proper safety adaptation and remediation techniques during self care or transfers.   Instruct in proper use of assistive devices.                  Learning Progress Summary             Patient Acceptance, E,TB, VU by RM at 5/28/2024 0439    Acceptance, E,D, DU by PG at 4/24/2024 1039    Acceptance, E,TB, VU by RM at 2/14/2024 0518    Acceptance, E,TB, VU by RM at 1/9/2024 0420    Acceptance, E, VU by CR at 12/30/2023 1750    Acceptance, E,TB, VU by RM at 11/30/2023 0420    Acceptance, E,D, DU by PG at 11/7/2023 0932                         Point: Home exercise program (Done)       Description:   Instruct learner(s) on appropriate technique for monitoring, assisting and/or progressing therapeutic exercises/activities.                  Learning Progress Summary             Patient Acceptance, E,TB, VU by RM at 5/28/2024 0439    Acceptance, E,D, DU by PG at 4/24/2024 1039    Acceptance, E,TB, VU by RM at 2/14/2024 0518    Acceptance, E,TB, VU by RM at 1/9/2024 0420    Acceptance, E, VU by CR at 12/30/2023 1750    Acceptance, E,TB, VU by RM at 11/30/2023 0420    Acceptance, E,D, DU by PG at 11/7/2023 0932                         Point: Precautions (Done)       Description:   Instruct learner(s) on prescribed precautions during self-care and functional transfers.                  Learning Progress Summary             Patient Acceptance, E,TB, VU by RM at  5/28/2024 0439    Acceptance, E,D, DU by PG at 4/24/2024 1039    Acceptance, E,TB, VU by RM at 2/14/2024 0518    Acceptance, E,TB, VU by RM at 1/9/2024 0420    Acceptance, E, VU by CR at 12/30/2023 1750    Acceptance, E,TB, VU by RM at 11/30/2023 0420    Acceptance, E,D, DU by PG at 11/7/2023 0932                         Point: Body mechanics (Done)       Description:   Instruct learner(s) on proper positioning and spine alignment during self-care, functional mobility activities and/or exercises.                  Learning Progress Summary             Patient Acceptance, E,TB, VU by RM at 5/28/2024 0439    Acceptance, E,D, DU by PG at 4/24/2024 1039    Acceptance, E,TB, VU by RM at 2/14/2024 0518    Acceptance, E,TB, VU by RM at 1/9/2024 0420    Acceptance, E, VU by CR at 12/30/2023 1750    Acceptance, E,TB, VU by RM at 11/30/2023 0420    Acceptance, E,D, DU by PG at 11/7/2023 0932                                         User Key       Initials Effective Dates Name Provider Type Discipline     06/08/21 -  Bharath Berg, RN Registered Nurse Nurse    CR 06/13/22 -  Adilson Baker LPN Licensed Nurse Nurse    PG 06/16/21 -  Kodak Matos OT Occupational Therapist OT                  OT Recommendation and Plan     Plan of Care Review  Plan of Care Reviewed With: patient  Progress: no change  Outcome Evaluation: No signficant changes.  Pt. did participate and engage with pain being his biggest barrier to independence in hips and knees.  Plan to continue POC established with LTGs.     Time Calculation:         Time Calculation- OT       Row Name 06/04/24 1404             Time Calculation- OT    OT Received On 06/04/24  -GC         Timed Charges    48759 - OT Therapeutic Exercise Minutes 10  -GC      39134 - OT Therapeutic Activity Minutes 20  -GC         SNF Occupational Therapy Minutes    Skilled Minutes- OT 30 min  -GC         Total Minutes    Timed Charges Total Minutes 30  -GC       Total Minutes 30  -GC                 User Key  (r) = Recorded By, (t) = Taken By, (c) = Cosigned By      Initials Name Provider Type     Rosalva Irvin OT Occupational Therapist                  Therapy Charges for Today       Code Description Service Date Service Provider Modifiers Qty    [id number]  OT THER PROC EA 15 MIN 6/4/2024 Rosalva Irvin OT GO 1    [id number]  OT THERAPEUTIC ACT EA 15 MIN 6/4/2024 Rosalva Irvin OT GO 1                 Rosalva Irvin OT  6/4/2024

## 2024-06-04 NOTE — THERAPY TREATMENT NOTE
SNF - Physical Therapy Treatment Note  KEO Dominguez     Patient Name: Jose Shaikh  : 1960  MRN: [id number]  Today's Date: 2024      Visit Dx:    ICD-10-CM ICD-9-CM   1. Difficulty in walking  R26.2 719.7   2. Type 2 diabetes mellitus with other specified complication, unspecified whether long term insulin use  E11.69 250.80     Patient Active Problem List   Diagnosis    Diabetic ulcer of left heel associated with type 2 DM    Acute osteomyelitis of left calcaneus     Diabetic ulcer of left heel associated with type 2 DM    Diabetic ulcer of right midfoot associated with type 2 DM    Paroxysmal atrial fibrillation    Essential hypertension    Hyperlipidemia LDL goal <100    Cellulitis and abscess of foot    High alkaline phosphatase level    Osteomyelitis    Onychomycosis    Onychocryptosis    Foot pain, bilateral    Osteomyelitis of foot, right, acute    Cellulitis of right foot    Type 2 diabetes mellitus, with long-term current use of insulin    Class 3 severe obesity due to excess calories with serious comorbidity and body mass index (BMI) of 45.0 to 49.9 in adult    Anxiety disorder, unspecified    Claustrophobia    Dependence on wheelchair    Depression, unspecified    Long term (current) use of anticoagulants    Long term (current) use of oral hypoglycemic drugs    Wound of foot    Ulcer of right foot    Orthostatic hypotension    Other chronic osteomyelitis, right ankle and foot    Personal history of nicotine dependence    Thrombocytopenia, unspecified    Unspecified open wound, right foot, initial encounter    Diabetic foot infection    Subacute osteomyelitis of right foot    Right foot pain    Sepsis    Onychomycosis    Foot pain, left    Impaired mobility and ADLs    Absence of toe of right foot    Corns and callosity    Disability of walking    Fracture    Limb swelling    Polyneuropathy    Pressure ulcer, stage 1    Shortness of breath    Generalized weakness    Debility    Ulcerated, foot,  left, limited to breakdown of skin    Onychomycosis     Past Medical History:   Diagnosis Date    Absence of toe of right foot     Acute osteomyelitis of left calcaneus  08/18/2021    Anxiety and depression     Arthritis     Cancer     Chronic pain     STATES HIS PAIN IS 10/10 AAT    Claustrophobia     Corns and callus     Diabetic ulcer of left heel associated with type 2 DM 08/18/2021    Diabetic ulcer of left heel associated with type 2 DM 07/06/2021    Diabetic ulcer of right midfoot associated with type 2 DM 08/18/2021    Difficulty walking     Essential hypertension 08/31/2021    Hammertoe     Hyperlipidemia LDL goal <100 08/31/2021    Ingrown toenail     Obesity     Paroxysmal atrial fibrillation 08/31/2021    Polyneuropathy     Pressure ulcer, stage 1     Tinea unguium     Type 2 diabetes mellitus with polyneuropathy      Past Surgical History:   Procedure Laterality Date    CYST REMOVAL      center of back; benign    EYE SURGERY      INCISION AND DRAINAGE ABSCESS      back    INCISION AND DRAINAGE LEG Right 12/10/2021    Procedure: INCISION AND DRAINAGE LOWER EXTREMITY;  Surgeon: Ash Leyva DPM;  Location: [facility];  Service: Podiatry;  Laterality: Right;    OTHER SURGICAL HISTORY      Surgical clips left foot    TOE SURGERY Right     Removal of 5th toe    TRANS METATARSAL AMPUTATION Right 12/02/2021    Procedure: AMPUTATION TRANS METATARSAL;  Surgeon: Ash Leyva DPM;  Location: [facility] OR;  Service: Podiatry;  Laterality: Right;    VASCULAR SURGERY      WRIST SURGERY Left     repair of injury       PT Assessment (Last 12 Hours)       PT Evaluation and Treatment       Row Name 06/04/24 3513          Physical Therapy Time and Intention    Subjective Information complains of;pain  -VK     Document Type therapy note (daily note)  -VK     Mode of Treatment individual therapy;physical therapy  -VK     Patient Effort good  -VK       Row Name 06/04/24 1323          General  Information    Patient Profile Reviewed yes  -VK     Existing Precautions/Restrictions fall  -VK       Row Name 06/04/24 1320          Pain    Pain Location - Side/Orientation Left  -VK     Pain Location - hip;knee  -VK     Pain Intervention(s) Repositioned;Rest;Nursing Notified;Ambulation/increased activity  -VK       Row Name 06/04/24 1320          Cognition    Affect/Mental Status (Cognition) WNL  -VK     Orientation Status (Cognition) oriented x 3  -VK     Personal Safety Interventions nonskid shoes/slippers when out of bed;gait belt;fall prevention program maintained  -VK       Row Name 06/04/24 1320          Mobility    Extremity Weight-bearing Status left lower extremity;right lower extremity  -VK     Left Lower Extremity (Weight-bearing Status) weight-bearing as tolerated (WBAT)  -VK     Right Lower Extremity (Weight-bearing Status) weight-bearing as tolerated (WBAT)  -VK       Row Name 06/04/24 1320          Bed Mobility    Supine-Sit Cornish (Bed Mobility) minimum assist (75% patient effort);moderate assist (50% patient effort);2 person assist  -VK     Sit-Supine [place] (Bed Mobility) moderate assist (50% patient effort);2 person assist  -VK     Bed Mobility, Safety Issues decreased use of legs for bridging/pushing  -VK     Assistive Device (Bed Mobility) bed rails;draw sheet;head of bed elevated;overhead trapeze  -VK       Row Name 06/04/24 1320          Sit-Stand Transfer    Sit-Stand Cornish (Transfers) moderate assist (50% patient effort);2 person assist;verbal cues  -VK     Assistive Device (Sit-Stand Transfers) walker, front-wheeled;bariatric  -VK       Row Name 06/04/24 1320          Stand-Sit Transfer    Stand-Sit Corn[place] (Transfers) moderate assist (50% patient effort);verbal cues;2 person assist  -VK     Assistive Device (Stand-Sit Transfers) walker, front-wheeled;bariatric  -VK       Row Name 06/04/24 1320          Gait/Stairs (Locomotion)    Reason Patient was unable to  Ambulate Uncontrolled Pain  -       Row Name 06/04/24 1320          Safety Issues, Functional Mobility    Impairments Affecting Function (Mobility) balance;endurance/activity tolerance;range of motion (ROM);strength  -       Row Name 06/04/24 1320          Balance    Sit to Stand Dynamic Balance moderate assist;2-person assist  -VK     Static Standing Balance minimal assist;2-person assist  -VK     Dynamic Standing Balance moderate assist;2-person assist  -VK     Position/Device Used, Standing Balance walker, front-wheeled  Bariatric  -     Balance Interventions sit to stand  x1  -       Row Name 06/04/24 1320          Hip (Therapeutic Exercise)    Hip Isometrics (Therapeutic Exercise) gluteal sets;10 repetitions;3 sets;aDduction  -       Row Name 06/04/24 1320          Knee (Therapeutic Exercise)    Knee AROM (Therapeutic Exercise) bilateral;SAQ (short arc quad);30 repititions  4lb ankle weights  -[facility] Name 06/04/24 1320          Ankle (Therapeutic Exercise)    Ankle AROM (Therapeutic Exercise) right;30 repititions;dorsiflexion  -     Ankle Strengthening (Therapeutic Exercise) dorsiflexion;resistance band;green;left;30 repititions  -       Row Name             Wound 03/27/24 1045 Right anterior Skin Tear    Wound - Properties Group Placement Date: 03/27/24  -CR Placement Time: 1045  -CR Side: Right  -CR Orientation: anterior  -CR Primary Wound Type: Skin tear  -CR    Retired Wound - Properties Group Placement Date: 03/27/24  -CR Placement Time: 1045  -CR Side: Right  -CR Orientation: anterior  -CR Primary Wound Type: Skin tear  -CR    Retired Wound - Properties Group Date first assessed: 03/27/24  -CR Time first assessed: 1045  -CR Side: Right  -CR Primary Wound Type: Skin tear  -CR      Row Name             Wound 05/02/24 1033 Left posterior plantar    Wound - Properties Group Placement Date: 05/02/24  -KR Placement Time: 1033  -KR Side: Left  -KR Orientation: posterior  -KR Location: plantar   -KR    Retired Wound - Properties Group Placement Date: 05/02/24  -KR Placement Time: 1033 -KR Side: Left  -KR Orientation: posterior  -KR Location: plantar  -KR    Retired Wound - Properties Group Date first assessed: 05/02/24  -KR Time first assessed: 1033  -KR Side: Left  -KR Location: plantar  -KR      Row Name 06/04/24 1320          Positioning and Restraints    Pre-Treatment Position in bed  -VK     Post Treatment Position bed  -VK     In Bed fowlers;call light within reach;encouraged to call for assist  Refuses alarms  -VK       Row Name 06/04/24 1320          Progress Summary (PT)    Progress Toward Functional Goals (PT) progress toward functional goals is gradual  -VK               User Key  (r) = Recorded By, (t) = Taken By, (c) = Cosigned By      Initials Name Provider Type    Adilson Carmona LPN Licensed Nurse    Anya Palumbo PTA Physical Therapist Assistant    Almaz Morales, RN Registered Nurse                  Section G              Section GG                       Physical Therapy Education       Title: PT OT SLP Therapies (Done)       Topic: Physical Therapy (Done)       Point: Mobility training (Done)       Learning Progress Summary             Patient Acceptance, E,TB, VU by RM at 5/28/2024 0439    Acceptance, E,D, DU by PG at 4/24/2024 1039    Acceptance, E,TB, VU by RM at 2/14/2024 0518    Acceptance, E,TB, VU by RM at 1/9/2024 0420    Acceptance, E, VU by CR at 12/30/2023 1750    Acceptance, E, VU by CR at 12/20/2023 1345    Acceptance, E, VU by CR at 12/19/2023 1004    Acceptance, E,TB, VU by RM at 11/30/2023 0420    Acceptance, E,TB, VU by MOE at 11/8/2023 1341                         Point: Precautions (Done)       Learning Progress Summary             Patient Acceptance, E,TB, VU by RM at 5/28/2024 0439    Acceptance, E,D, DU by PG at 4/24/2024 1039    Acceptance, E,TB, VU by RM at 2/14/2024 0518    Acceptance, E,TB, VU by RM at 1/9/2024 0420    Acceptance, E, VU by CR at  12/30/2023 1750    Acceptance, E,TB, VU by RM at 11/30/2023 0420    Acceptance, E,TB, VU by MOE at 11/8/2023 1341                                         User Key       Initials Effective Dates Name Provider Type Discipline     06/08/21 -  Bharath Berg, RN Registered Nurse Nurse    CR 06/13/22 -  Adilson Baker LPN Licensed Nurse Nurse    PG 06/16/21 -  Kodak Matos OT Occupational Therapist OT    MOE 06/03/21 -  Merlin Rao PT Physical Therapist PT                  PT Recommendation and Plan     Progress Summary (PT)  Progress Toward Functional Goals (PT): progress toward functional goals is gradual  Daily Progress Summary (PT): Pt agreeable to treatment. Worked on bed mobility, strengthening and transfers today. Pt was unable to ambulate today due to L hip pain. Pt appears to be mostly limited by pain and activity tolerance. Pt continues to benefit from skilled PT to address stated deficits.   Outcome Measures       Row Name 06/04/24 1404 06/03/24 1416 06/01/24 1600       How much help from another person do you currently need...    Turning from your back to your side while in flat bed without using bedrails? 4  -VK 4  -WM 4  -CS    Moving from lying on back to sitting on the side of a flat bed without bedrails? 3  -VK 3  -WM 3  -CS    Moving to and from a bed to a chair (including a wheelchair)? 2  -VK 2  -WM 2  -CS    Standing up from a chair using your arms (e.g., wheelchair, bedside chair)? 2  -VK 2  -WM 2  -CS    Climbing 3-5 steps with a railing? 1  -VK 1  -WM 1  -CS    To walk in hospital room? 2  -VK 2  -WM 2  -CS    AM-PAC 6 Clicks Score (PT) 14  -VK 14  -WM 14  -CS    Highest Level of Mobility Goal 4 --> Transfer to chair/commode  -VK 4 --> Transfer to chair/commode  -WM 4 --> Transfer to chair/commode  -CS       Functional Assessment    Outcome Measure Options -- -- AM-PAC 6 Clicks Basic Mobility (PT)  -CS              User Key  (r) = Recorded By, (t) = Taken By, (c) = Cosigned By       Initials Name Provider Type     Carson Inman, PTA Physical Therapist Assistant    Ronald Huerta, CURTIS Physical Therapist Assistant    Anya Palumbo PTA Physical Therapist Assistant                     Time Calculation:    PT Charges       Row Name 06/04/24 1412             Time Calculation    PT Received On 06/04/24  -VK         Timed Charges    13714 - PT Therapeutic Exercise Minutes 18  -VK      01888 - PT Therapeutic Activity Minutes 22  -VK         SNF Physical Therapy Minutes    Skilled Minutes- PT 40 min  -VK         Total Minutes    Timed Charges Total Minutes 40  -VK       Total Minutes 40  -VK                User Key  (r) = Recorded By, (t) = Taken By, (c) = Cosigned By      Initials Name Provider Type    JARVIS Anya Sam, CURTIS Physical Therapist Assistant                  Therapy Charges for Today       Code Description Service Date Service Provider Modifiers Qty    71689425141 HC PT THERAPEUTIC ACT EA 15 MIN 6/4/2024 Anya Sam PTA GP 2    39029182240 HC PT THER PROC EA 15 MIN 6/4/2024 Anya Sam PTA GP 1            PT G-Codes  Outcome Measure Options: AM-PAC 6 Clicks Daily Activity (OT), Optimal Instrument  AM-PAC 6 Clicks Score (PT): 14  AM-PAC 6 Clicks Score (OT): 15    Anya Sam PTA  6/4/2024

## 2024-06-04 NOTE — PLAN OF CARE
Goal Outcome Evaluation:  Plan of Care Reviewed With: patient VSS, AAOX4, makes needs known to staff.     Pain meds x3 with usual results, goal : pain tolerable. Call light and personal items within reach at bedside.  No changes. Continue POC.

## 2024-06-05 LAB
GLUCOSE BLDC GLUCOMTR-MCNC: 102 MG/DL (ref 70–99)
GLUCOSE BLDC GLUCOMTR-MCNC: 114 MG/DL (ref 70–99)
GLUCOSE BLDC GLUCOMTR-MCNC: 115 MG/DL (ref 70–99)
GLUCOSE BLDC GLUCOMTR-MCNC: 139 MG/DL (ref 70–99)

## 2024-06-05 PROCEDURE — 63710000001 INSULIN GLARGINE PER 5 UNITS: Performed by: INTERNAL MEDICINE

## 2024-06-05 PROCEDURE — 97530 THERAPEUTIC ACTIVITIES: CPT

## 2024-06-05 PROCEDURE — 82948 REAGENT STRIP/BLOOD GLUCOSE: CPT | Performed by: INTERNAL MEDICINE

## 2024-06-05 PROCEDURE — 82948 REAGENT STRIP/BLOOD GLUCOSE: CPT

## 2024-06-05 PROCEDURE — 63710000001 INSULIN GLARGINE PER 5 UNITS: Performed by: PHYSICIAN ASSISTANT

## 2024-06-05 RX ORDER — SEMAGLUTIDE 1.34 MG/ML
1 INJECTION, SOLUTION SUBCUTANEOUS
Qty: 3 ML | Refills: 1 | Status: CANCELLED | OUTPATIENT
Start: 2024-06-05

## 2024-06-05 RX ORDER — FUROSEMIDE 40 MG
40 TABLET ORAL DAILY
Status: DISCONTINUED | OUTPATIENT
Start: 2024-06-06 | End: 2024-06-29

## 2024-06-05 RX ADMIN — BACLOFEN 10 MG: 10 TABLET ORAL at 22:47

## 2024-06-05 RX ADMIN — ACETAMINOPHEN 650 MG: 325 TABLET ORAL at 17:06

## 2024-06-05 RX ADMIN — CYANOCOBALAMIN TAB 500 MCG 1000 MCG: 500 TAB at 08:39

## 2024-06-05 RX ADMIN — IBUPROFEN 400 MG: 400 TABLET ORAL at 01:17

## 2024-06-05 RX ADMIN — Medication 500 MG: at 22:48

## 2024-06-05 RX ADMIN — DICLOFENAC SODIUM 4 G: 10 GEL TOPICAL at 14:06

## 2024-06-05 RX ADMIN — MONTELUKAST 10 MG: 10 TABLET, FILM COATED ORAL at 22:47

## 2024-06-05 RX ADMIN — PREGABALIN 100 MG: 100 CAPSULE ORAL at 22:48

## 2024-06-05 RX ADMIN — DICLOFENAC SODIUM 4 G: 10 GEL TOPICAL at 22:51

## 2024-06-05 RX ADMIN — OXYCODONE AND ACETAMINOPHEN 1 TABLET: 10; 325 TABLET ORAL at 22:47

## 2024-06-05 RX ADMIN — BACLOFEN 10 MG: 10 TABLET ORAL at 04:37

## 2024-06-05 RX ADMIN — ATORVASTATIN CALCIUM 10 MG: 10 TABLET, FILM COATED ORAL at 22:48

## 2024-06-05 RX ADMIN — DICLOFENAC SODIUM 4 G: 10 GEL TOPICAL at 04:38

## 2024-06-05 RX ADMIN — SERTRALINE HYDROCHLORIDE 50 MG: 50 TABLET ORAL at 22:48

## 2024-06-05 RX ADMIN — DICLOFENAC SODIUM 4 G: 10 GEL TOPICAL at 07:30

## 2024-06-05 RX ADMIN — IBUPROFEN 400 MG: 400 TABLET ORAL at 23:28

## 2024-06-05 RX ADMIN — FERROUS SULFATE TAB 325 MG (65 MG ELEMENTAL FE) 325 MG: 325 (65 FE) TAB at 08:38

## 2024-06-05 RX ADMIN — OXYCODONE AND ACETAMINOPHEN 1 TABLET: 10; 325 TABLET ORAL at 04:37

## 2024-06-05 RX ADMIN — INSULIN GLARGINE 22 UNITS: 100 INJECTION, SOLUTION SUBCUTANEOUS at 22:46

## 2024-06-05 RX ADMIN — Medication 500 MG: at 08:38

## 2024-06-05 RX ADMIN — Medication 1 APPLICATION: at 22:51

## 2024-06-05 RX ADMIN — EMPAGLIFLOZIN 10 MG: 10 TABLET, FILM COATED ORAL at 08:39

## 2024-06-05 RX ADMIN — CETIRIZINE HYDROCHLORIDE 10 MG: 10 TABLET, FILM COATED ORAL at 08:39

## 2024-06-05 RX ADMIN — APIXABAN 5 MG: 5 TABLET, FILM COATED ORAL at 08:39

## 2024-06-05 RX ADMIN — Medication 10 MG: at 22:47

## 2024-06-05 RX ADMIN — BACLOFEN 10 MG: 10 TABLET ORAL at 13:01

## 2024-06-05 RX ADMIN — INSULIN GLARGINE 35 UNITS: 100 INJECTION, SOLUTION SUBCUTANEOUS at 08:37

## 2024-06-05 RX ADMIN — PREGABALIN 100 MG: 100 CAPSULE ORAL at 08:39

## 2024-06-05 RX ADMIN — OXYCODONE AND ACETAMINOPHEN 1 TABLET: 10; 325 TABLET ORAL at 13:01

## 2024-06-05 RX ADMIN — ACETAMINOPHEN 650 MG: 325 TABLET ORAL at 07:29

## 2024-06-05 RX ADMIN — APIXABAN 5 MG: 5 TABLET, FILM COATED ORAL at 22:47

## 2024-06-05 RX ADMIN — PREGABALIN 100 MG: 100 CAPSULE ORAL at 16:37

## 2024-06-05 NOTE — THERAPY TREATMENT NOTE
SNF - Occupational Therapy Treatment Note  KEO Dominguez    Patient Name: Jose Shaikh  : 1960    MRN: [id number]                              Today's Date: 2024       Admit Date: 2023    Visit Dx:     ICD-10-CM ICD-9-CM   1. Difficulty in walking  R26.2 719.7   2. Type 2 diabetes mellitus with other specified complication, unspecified whether long term insulin use  E11.69 250.80     Patient Active Problem List   Diagnosis    Diabetic ulcer of left heel associated with type 2 DM    Acute osteomyelitis of left calcaneus     Diabetic ulcer of left heel associated with type 2 DM    Diabetic ulcer of right midfoot associated with type 2 DM    Paroxysmal atrial fibrillation    Essential hypertension    Hyperlipidemia LDL goal <100    Cellulitis and abscess of foot    High alkaline phosphatase level    Osteomyelitis    Onychomycosis    Onychocryptosis    Foot pain, bilateral    Osteomyelitis of foot, right, acute    Cellulitis of right foot    Type 2 diabetes mellitus, with long-term current use of insulin    Class 3 severe obesity due to excess calories with serious comorbidity and body mass index (BMI) of 45.0 to 49.9 in adult    Anxiety disorder, unspecified    Claustrophobia    Dependence on wheelchair    Depression, unspecified    Long term (current) use of anticoagulants    Long term (current) use of oral hypoglycemic drugs    Wound of foot    Ulcer of right foot    Orthostatic hypotension    Other chronic osteomyelitis, right ankle and foot    Personal history of nicotine dependence    Thrombocytopenia, unspecified    Unspecified open wound, right foot, initial encounter    Diabetic foot infection    Subacute osteomyelitis of right foot    Right foot pain    Sepsis    Onychomycosis    Foot pain, left    Impaired mobility and ADLs    Absence of toe of right foot    Corns and callosity    Disability of walking    Fracture    Limb swelling    Polyneuropathy    Pressure ulcer, stage 1    Shortness of  breath    Generalized weakness    Debility    Ulcerated, foot, left, limited to breakdown of skin    Onychomycosis     Past Medical History:   Diagnosis Date    Absence of toe of right foot     Acute osteomyelitis of left calcaneus  08/18/2021    Anxiety and depression     Arthritis     Cancer     Chronic pain     STATES HIS PAIN IS 10/10 AAT    Claustrophobia     Corns and callus     Diabetic ulcer of left heel associated with type 2 DM 08/18/2021    Diabetic ulcer of left heel associated with type 2 DM 07/06/2021    Diabetic ulcer of right midfoot associated with type 2 DM 08/18/2021    Difficulty walking     Essential hypertension 08/31/2021    Hammertoe     Hyperlipidemia LDL goal <100 08/31/2021    Ingrown toenail     Obesity     Paroxysmal atrial fibrillation 08/31/2021    Polyneuropathy     Pressure ulcer, stage 1     Tinea unguium     Type 2 diabetes mellitus with polyneuropathy      Past Surgical History:   Procedure Laterality Date    CYST REMOVAL      center of back; benign    EYE SURGERY      INCISION AND DRAINAGE ABSCESS      back    INCISION AND DRAINAGE LEG Right 12/10/2021    Procedure: INCISION AND DRAINAGE LOWER EXTREMITY;  Surgeon: Ash Leyva DPM;  Location: [facility] MAIN OR;  Service: Podiatry;  Laterality: Right;    OTHER SURGICAL HISTORY      Surgical clips left foot    TOE SURGERY Right     Removal of 5th toe    TRANS METATARSAL AMPUTATION Right 12/02/2021    Procedure: AMPUTATION TRANS METATARSAL;  Surgeon: Ash Leyva DPM;  Location: [facility] MAIN OR;  Service: Podiatry;  Laterality: Right;    VASCULAR SURGERY      WRIST SURGERY Left     repair of injury      General Information       Row Name 06/05/24 1321          OT Time and Intention    Document Type therapy note (daily note)  -ES     Mode of Treatment individual therapy;occupational therapy  -ES       Row Name 06/05/24 1326          General Information    Patient Profile Reviewed yes  -ES     Existing  "Precautions/Restrictions fall  -ES     Barriers to Rehab none identified  -ES       Row Name 06/05/24 1321          Cognition    Orientation Status (Cognition) oriented x 4  -ES       Row Name 06/05/24 1321          Safety Issues, Functional Mobility    Impairments Affecting Function (Mobility) balance;endurance/activity tolerance;strength;pain  -ES               User Key  (r) = Recorded By, (t) = Taken By, (c) = Cosigned By      Initials Name Provider Type    ES Katlyn Mantilla, OTR/L, CSRS Occupational Therapist                     Mobility/ADL's       Row Name 06/05/24 1323          Bed Mobility    Bed Mobility bed mobility (all) activities  -ES     Supine-Sit Gays (Bed Mobility) moderate assist (50% patient effort);2 person assist  -ES     Sit-Supine Gays (Bed Mobility) moderate assist (50% patient effort);2 person assist  -ES     Bed Mobility, Safety Issues decreased use of legs for bridging/pushing  -ES     Assistive Device (Bed Mobility) bed rails;head of bed elevated;overhead trapeze  -ES       Row Name 06/05/24 1323          Transfers    Transfers sit-stand transfer;stand-sit transfer  -ES     Comment, (Transfers) Attempted to educate patient on alternate hand placement with sit <> stand for patient safety, as patient prefers BUEs on rolling walker, which impacts patient safety. Patient is not agreeable to alternative transfer techniques, does not attempt and states multiple times therapist education and recommendations are \"the dumbest thing he has ever heard.\"  -ES       Row Name 06/05/24 1323          Sit-Stand Transfer    Sit-Stand Gays (Transfers) moderate assist (50% patient effort);2 person assist;verbal cues  -ES     Assistive Device (Sit-Stand Transfers) walker, front-wheeled;bariatric  -ES     Comment, (Sit-Stand Transfer) Pt performed sit-stand x8 during session using RW, pt able to remain standing 20-30 seconds each attempt. During last attempt, pt able to use R hand to " "stabilize walker and complete a fine motor task with the L hand. Patient requires increased time between each stance as patient will divert therapist attention to prolongue treatment intervention.  -ES       Row Name 06/05/24 1323          Stand-Sit Transfer    Stand-Sit Mathews (Transfers) moderate assist (50% patient effort);2 person assist;verbal cues  -ES     Assistive Device (Stand-Sit Transfers) walker, front-wheeled;bariatric  -ES       Row Name 06/05/24 1323          Mobility    Extremity Weight-bearing Status left lower extremity;right lower extremity  -ES     Left Lower Extremity (Weight-bearing Status) weight-bearing as tolerated (WBAT)  -ES     Right Lower Extremity (Weight-bearing Status) weight-bearing as tolerated (WBAT)  -ES               User Key  (r) = Recorded By, (t) = Taken By, (c) = Cosigned By      Initials Name Provider Type    Katlyn Gutierres, BEKAHR/L, NADEGES Occupational Therapist                   Obj/Interventions       Row Name 06/05/24 1329          Motor Skills    Motor Skills functional endurance  -ES     Functional Endurance F, pt completed sit-stand x8, standing 20-30 seonds per trial. Attempted to encourage to prolongue stance, however patient is self limiting at times and returns self to edge of bed stating \"i just cant do it\" despite max encouragement.  -ES       Row Name 06/05/24 1329          Balance    Balance Assessment sitting dynamic balance;standing static balance  -ES     Dynamic Sitting Balance standby assist  -ES     Position, Sitting Balance sitting edge of bed  -ES     Dynamic Standing Balance moderate assist;2-person assist  Pt required min/mod A to maintain standing balance  -ES     Position/Device Used, Standing Balance walker, 4-wheeled  -ES               User Key  (r) = Recorded By, (t) = Taken By, (c) = Cosigned By      Initials Name Provider Type    Katlyn Gutierres, BEKAHR/L, CSRS Occupational Therapist                   Goals/Plan    No documentation.        "           Clinical Impression       Row Name 06/05/24 3624          Pain Scale: FACES Pre/Post-Treatment    Pain Location - hip;knee  -ES     Pre/Posttreatment Pain Comment patient reports pain in hip and knee, however does not provide numerical rating for pain  -ES       Row Name 06/05/24 4366          Plan of Care Review    Plan of Care Reviewed With patient  -ES     Progress declining  -ES     Outcome Evaluation Patient continues to demonstrate limited agreeability to therapy participation. Patient declines functional mobility secondary to pain and requires max encouragement to complete STS. Patient does demonstrate increased tolerance, completes 8 STS with increased time in stance compared to prior sessions. However, patient is adamant at declining further therapeutic intervention in stance, and is self limiting throughout session, despite max encouragement from therapist for increased participation. Patient has plateaued and does not demonstrate agreeability to progression of any therapeutic intervention, despite attempts from multiple therapists and angles for intervention.  -ES       Row Name 06/05/24 5290          Positioning and Restraints    In Bed sitting EOB;call light within reach;encouraged to call for assist;with nsg  -ES               User Key  (r) = Recorded By, (t) = Taken By, (c) = Cosigned By      Initials Name Provider Type    ES Katlyn Mantilla, OTR/L, CSRS Occupational Therapist                   Outcome Measures       Row Name 06/05/24 8547          How much help from another is currently needed...    Putting on and taking off regular lower body clothing? 1  -ES     Bathing (including washing, rinsing, and drying) 2  -ES     Toileting (which includes using toilet bed pan or urinal) 2  -ES     Putting on and taking off regular upper body clothing 3  -ES     Taking care of personal grooming (such as brushing teeth) 3  -ES     Eating meals 4  -ES     AM-PAC 6 Clicks Score (OT) 15  -ES       Row  Name 06/05/24 1200 06/05/24 0300       How much help from another person do you currently need...    Turning from your back to your side while in flat bed without using bedrails? 4  -VK 4  -DS    Moving from lying on back to sitting on the side of a flat bed without bedrails? 3  -VK 3  -DS    Moving to and from a bed to a chair (including a wheelchair)? 2  -VK 2  -DS    Standing up from a chair using your arms (e.g., wheelchair, bedside chair)? 2  -VK 3  -DS    Climbing 3-5 steps with a railing? 1  -VK 1  -DS    To walk in hospital room? 2  -VK 2  -DS    AM-PAC 6 Clicks Score (PT) 14  -VK 15  -DS    Highest Level of Mobility Goal 4 --> Transfer to chair/commode  -VK 4 --> Transfer to chair/commode  -DS      Row Name 06/05/24 1338          Functional Assessment    Outcome Measure Options AM-PAC 6 Clicks Daily Activity (OT)  -ES               User Key  (r) = Recorded By, (t) = Taken By, (c) = Cosigned By      Initials Name Provider Type    DS Linda Damico, RN Registered Nurse    Katlyn Gutierres, OTR/L, CSRS Occupational Therapist    Anya Palumbo PTA Physical Therapist Assistant                  Section G              Section GG                         Occupational Therapy Education       Title: PT OT SLP Therapies (Done)       Topic: Occupational Therapy (Done)       Point: ADL training (Done)       Description:   Instruct learner(s) on proper safety adaptation and remediation techniques during self care or transfers.   Instruct in proper use of assistive devices.                  Learning Progress Summary             Patient Acceptance, E,TB, VU by RM at 5/28/2024 0439    Acceptance, E,D, DU by PG at 4/24/2024 1039    Acceptance, E,TB, VU by RM at 2/14/2024 0518    Acceptance, E,TB, VU by RM at 1/9/2024 0420    Acceptance, E, VU by CR at 12/30/2023 1750    Acceptance, E,TB, VU by RM at 11/30/2023 0420    Acceptance, E,D, DU by PG at 11/7/2023 0932                         Point: Home exercise program (Done)        Description:   Instruct learner(s) on appropriate technique for monitoring, assisting and/or progressing therapeutic exercises/activities.                  Learning Progress Summary             Patient Acceptance, E,TB, VU by RM at 5/28/2024 0439    Acceptance, E,D, DU by PG at 4/24/2024 1039    Acceptance, E,TB, VU by RM at 2/14/2024 0518    Acceptance, E,TB, VU by RM at 1/9/2024 0420    Acceptance, E, VU by CR at 12/30/2023 1750    Acceptance, E,TB, VU by RM at 11/30/2023 0420    Acceptance, E,D, DU by PG at 11/7/2023 0932                         Point: Precautions (Done)       Description:   Instruct learner(s) on prescribed precautions during self-care and functional transfers.                  Learning Progress Summary             Patient Acceptance, E,TB, VU by RM at 5/28/2024 0439    Acceptance, E,D, DU by PG at 4/24/2024 1039    Acceptance, E,TB, VU by RM at 2/14/2024 0518    Acceptance, E,TB, VU by RM at 1/9/2024 0420    Acceptance, E, VU by CR at 12/30/2023 1750    Acceptance, E,TB, VU by RM at 11/30/2023 0420    Acceptance, E,D, DU by PG at 11/7/2023 0932                         Point: Body mechanics (Done)       Description:   Instruct learner(s) on proper positioning and spine alignment during self-care, functional mobility activities and/or exercises.                  Learning Progress Summary             Patient Acceptance, E,TB, VU by RM at 5/28/2024 0439    Acceptance, E,D, DU by PG at 4/24/2024 1039    Acceptance, E,TB, VU by RM at 2/14/2024 0518    Acceptance, E,TB, VU by RM at 1/9/2024 0420    Acceptance, E, VU by CR at 12/30/2023 1750    Acceptance, E,TB, VU by RM at 11/30/2023 0420    Acceptance, E,D, DU by PG at 11/7/2023 0932                                         User Key       Initials Effective Dates Name Provider Type Discipline     06/08/21 -  Bharaht Berg RN Registered Nurse Nurse     06/13/22 -  Adilson Baker LPN Licensed Nurse Nurse    PG 06/16/21 -  Kodak Matos, OT  Occupational Therapist OT                  OT Recommendation and Plan  Planned Therapy Interventions (OT): activity tolerance training, BADL retraining, functional balance retraining, occupation/activity based interventions, patient/caregiver education/training, ROM/therapeutic exercise, strengthening exercise, transfer/mobility retraining  Therapy Frequency (OT): 5 times/wk  Plan of Care Review  Plan of Care Reviewed With: patient  Progress: declining  Outcome Evaluation: Patient continues to demonstrate limited agreeability to therapy participation. Patient declines functional mobility secondary to pain and requires max encouragement to complete STS. Patient does demonstrate increased tolerance, completes 8 STS with increased time in stance compared to prior sessions. However, patient is adamant at declining further therapeutic intervention in stance, and is self limiting throughout session, despite max encouragement from therapist for increased participation. Patient has plateaued and does not demonstrate agreeability to progression of any therapeutic intervention, despite attempts from multiple therapists and angles for intervention.     Time Calculation:         Time Calculation- OT       Row Name 06/05/24 1447 06/05/24 1340          Time Calculation- OT    OT Received On 06/05/24  -ES 06/05/24  -ES     OT Goal Re-Cert Due Date 06/12/24  -ES 06/12/24  -ES        Timed Charges    02709 - OT Therapeutic Activity Minutes 32  -ES 32  -ES        SNF Occupational Therapy Minutes    Skilled Minutes- OT 32 min  -ES 32 min  -ES        Total Minutes    Timed Charges Total Minutes 32  -ES 32  -ES      Total Minutes 32  -ES 32  -ES               User Key  (r) = Recorded By, (t) = Taken By, (c) = Cosigned By      Initials Name Provider Type    Katlyn Gutierres, OTR/L, CSRS Occupational Therapist                  Therapy Charges for Today       Code Description Service Date Service Provider Modifiers Qty    [id number]  OT  THERAPEUTIC ACT EA 15 MIN 6/5/2024 Katlyn Mantilla, OTR/L, CSRS GO 2                 NEVAEH Garces/L, CSRS  6/5/2024

## 2024-06-05 NOTE — THERAPY TREATMENT NOTE
SNF - Physical Therapy Treatment Note  KEO Dominguez     Patient Name: Jose Shaikh  : 1960  MRN: [id number]  Today's Date: 2024      Visit Dx:    ICD-10-CM ICD-9-CM   1. Difficulty in walking  R26.2 719.7   2. Type 2 diabetes mellitus with other specified complication, unspecified whether long term insulin use  E11.69 250.80     Patient Active Problem List   Diagnosis    Diabetic ulcer of left heel associated with type 2 DM    Acute osteomyelitis of left calcaneus     Diabetic ulcer of left heel associated with type 2 DM    Diabetic ulcer of right midfoot associated with type 2 DM    Paroxysmal atrial fibrillation    Essential hypertension    Hyperlipidemia LDL goal <100    Cellulitis and abscess of foot    High alkaline phosphatase level    Osteomyelitis    Onychomycosis    Onychocryptosis    Foot pain, bilateral    Osteomyelitis of foot, right, acute    Cellulitis of right foot    Type 2 diabetes mellitus, with long-term current use of insulin    Class 3 severe obesity due to excess calories with serious comorbidity and body mass index (BMI) of 45.0 to 49.9 in adult    Anxiety disorder, unspecified    Claustrophobia    Dependence on wheelchair    Depression, unspecified    Long term (current) use of anticoagulants    Long term (current) use of oral hypoglycemic drugs    Wound of foot    Ulcer of right foot    Orthostatic hypotension    Other chronic osteomyelitis, right ankle and foot    Personal history of nicotine dependence    Thrombocytopenia, unspecified    Unspecified open wound, right foot, initial encounter    Diabetic foot infection    Subacute osteomyelitis of right foot    Right foot pain    Sepsis    Onychomycosis    Foot pain, left    Impaired mobility and ADLs    Absence of toe of right foot    Corns and callosity    Disability of walking    Fracture    Limb swelling    Polyneuropathy    Pressure ulcer, stage 1    Shortness of breath    Generalized weakness    Debility    Ulcerated, foot,  left, limited to breakdown of skin    Onychomycosis     Past Medical History:   Diagnosis Date    Absence of toe of right foot     Acute osteomyelitis of left calcaneus  08/18/2021    Anxiety and depression     Arthritis     Cancer     Chronic pain     STATES HIS PAIN IS 10/10 AAT    Claustrophobia     Corns and callus     Diabetic ulcer of left heel associated with type 2 DM 08/18/2021    Diabetic ulcer of left heel associated with type 2 DM 07/06/2021    Diabetic ulcer of right midfoot associated with type 2 DM 08/18/2021    Difficulty walking     Essential hypertension 08/31/2021    Hammertoe     Hyperlipidemia LDL goal <100 08/31/2021    Ingrown toenail     Obesity     Paroxysmal atrial fibrillation 08/31/2021    Polyneuropathy     Pressure ulcer, stage 1     Tinea unguium     Type 2 diabetes mellitus with polyneuropathy      Past Surgical History:   Procedure Laterality Date    CYST REMOVAL      center of back; benign    EYE SURGERY      INCISION AND DRAINAGE ABSCESS      back    INCISION AND DRAINAGE LEG Right 12/10/2021    Procedure: INCISION AND DRAINAGE LOWER EXTREMITY;  Surgeon: Ash Leyva DPM;  Location: [facility];  Service: Podiatry;  Laterality: Right;    OTHER SURGICAL HISTORY      Surgical clips left foot    TOE SURGERY Right     Removal of 5th toe    TRANS METATARSAL AMPUTATION Right 12/02/2021    Procedure: AMPUTATION TRANS METATARSAL;  Surgeon: Ash Leyva DPM;  Location: [facility] OR;  Service: Podiatry;  Laterality: Right;    VASCULAR SURGERY      WRIST SURGERY Left     repair of injury       PT Assessment (Last 12 Hours)       PT Evaluation and Treatment       Row Name 06/05/24 1032          Physical Therapy Time and Intention    Subjective Information complains of;pain  -VK     Document Type therapy note (daily note)  -VK     Mode of Treatment individual therapy;physical therapy  -VK     Patient Effort adequate  -VK       Row Name 06/05/24 1032          General  Information    Patient Profile Reviewed yes  -VK     Existing Precautions/Restrictions fall  -VK       Row Name 06/05/24 1032          Pain    Pain Location - Side/Orientation Left  -VK     Pain Location - hip;knee  -VK     Pain Intervention(s) Nursing Notified;Repositioned;Rest;Ambulation/increased activity  -VK       Row Name 06/05/24 1032          Cognition    Affect/Mental Status (Cognition) WNL  -VK     Orientation Status (Cognition) oriented x 4  -VK     Personal Safety Interventions nonskid shoes/slippers when out of bed;gait belt;fall prevention program maintained  -VK       Row Name 06/05/24 1032          Mobility    Extremity Weight-bearing Status left lower extremity;right lower extremity  -VK     Left Lower Extremity (Weight-bearing Status) weight-bearing as tolerated (WBAT)  -VK     Right Lower Extremity (Weight-bearing Status) weight-bearing as tolerated (WBAT)  -VK       Row Name 06/05/24 1032          Bed Mobility    Supine-Sit-Supine [place] (Bed Mobility) minimum assist (75% patient effort);2 person assist;verbal cues  -VK     Bed Mobility, Safety Issues decreased use of legs for bridging/pushing  -VK     Assistive Device (Bed Mobility) bed rails;head of bed elevated;overhead trapeze  -       Row Name 06/05/24 1032          Transfers    Transfers sit-stand transfer;stand-sit transfer  -       Row Name 06/05/24 1032          Sit-Stand Transfer    Sit-Stand [place] (Transfers) minimum assist (75% patient effort);moderate assist (50% patient effort);2 person assist;verbal cues  -VK     Assistive Device (Sit-Stand Transfers) walker, front-wheeled;bariatric  -VK       Row Name 06/05/24 1032          Stand-Sit Transfer    Stand-Sit [place] (Transfers) minimum assist (75% patient effort);moderate assist (50% patient effort);2 person assist;verbal cues  -VK     Assistive Device (Stand-Sit Transfers) walker, front-wheeled;bariatric  -VK       Row Name 06/05/24 1032          Gait/Stairs  (Locomotion)    Reason Patient was unable to Ambulate Uncontrolled Pain  -VK       Row Name 06/05/24 1032          Safety Issues, Functional Mobility    Impairments Affecting Function (Mobility) balance;endurance/activity tolerance;range of motion (ROM);strength  -VK       Row Name 06/05/24 1032          Balance    Sit to Stand Dynamic Balance minimal assist;moderate assist;verbal cues;2-person assist  -VK     Static Standing Balance minimal assist;verbal cues;2-person assist  -VK     Position/Device Used, Standing Balance walker, front-wheeled  bariatric  -VK     Balance Interventions sit to stand  Pt performed STS x6  -VK       Row Name             Wound 03/27/24 1045 Right anterior Skin Tear    Wound - Properties Group Placement Date: 03/27/24  -CR Placement Time: 1045  -CR Side: Right  -CR Orientation: anterior  -CR Primary Wound Type: Skin tear  -CR    Retired Wound - Properties Group Placement Date: 03/27/24  -CR Placement Time: 1045  -CR Side: Right  -CR Orientation: anterior  -CR Primary Wound Type: Skin tear  -CR    Retired Wound - Properties Group Date first assessed: 03/27/24  -CR Time first assessed: 1045  -CR Side: Right  -CR Primary Wound Type: Skin tear  -CR      Row Name             Wound 05/02/24 1033 Left posterior plantar    Wound - Properties Group Placement Date: 05/02/24  -KR Placement Time: 1033  -KR Side: Left  -KR Orientation: posterior  -KR Location: plantar  -KR    Retired Wound - Properties Group Placement Date: 05/02/24  -KR Placement Time: 1033  -KR Side: Left  -KR Orientation: posterior  -KR Location: plantar  -KR    Retired Wound - Properties Group Date first assessed: 05/02/24  -KR Time first assessed: 1033  -KR Side: Left  -KR Location: plantar  -KR      Row Name 06/05/24 1032          Positioning and Restraints    Pre-Treatment Position in bed  -VK     Post Treatment Position bed  -VK     In Bed sitting EOB;call light within reach;encouraged to call for assist;with nsg  -VK        Row Name 06/05/24 1032          Progress Summary (PT)    Progress Toward Functional Goals (PT) progress toward functional goals is fair  -VK               User Key  (r) = Recorded By, (t) = Taken By, (c) = Cosigned By      Initials Name Provider Type    CR Adilson Baker LPN Licensed Nurse    Anya Palumbo PTA Physical Therapist Assistant    Almaz Morales RN Registered Nurse                  Section G              Section GG                       Physical Therapy Education       Title: PT OT SLP Therapies (Done)       Topic: Physical Therapy (Done)       Point: Mobility training (Done)       Learning Progress Summary             Patient Acceptance, E,TB, VU by RM at 5/28/2024 0439    Acceptance, E,D, DU by PG at 4/24/2024 1039    Acceptance, E,TB, VU by RM at 2/14/2024 0518    Acceptance, E,TB, VU by RM at 1/9/2024 0420    Acceptance, E, VU by CR at 12/30/2023 1750    Acceptance, E, VU by CR at 12/20/2023 1345    Acceptance, E, VU by CR at 12/19/2023 1004    Acceptance, E,TB, VU by RM at 11/30/2023 0420    Acceptance, E,TB, VU by MOE at 11/8/2023 1341                         Point: Precautions (Done)       Learning Progress Summary             Patient Acceptance, E,TB, VU by RM at 5/28/2024 0439    Acceptance, E,D, DU by PG at 4/24/2024 1039    Acceptance, E,TB, VU by RM at 2/14/2024 0518    Acceptance, E,TB, VU by RM at 1/9/2024 0420    Acceptance, E, VU by CR at 12/30/2023 1750    Acceptance, E,TB, VU by RM at 11/30/2023 0420    Acceptance, E,TB, VU by MOE at 11/8/2023 1341                                         User Key       Initials Effective Dates Name Provider Type Discipline     06/08/21 -  Bharath Berg, RN Registered Nurse Nurse    ROLY 06/13/22 -  Adilson Baker LPN Licensed Nurse Nurse    BOB 06/16/21 -  Kodak Matos OT Occupational Therapist OT    MOE 06/03/21 -  Merlin Rao PT Physical Therapist PT                  PT Recommendation and Plan     Progress Summary (PT)  Progress Toward  Functional Goals (PT): progress toward functional goals is fair  Daily Progress Summary (PT): Pt agreeable to treatment. Worked on bed mobility, strengthening and transfers today. Pt was unable to ambulate today due to L hip pain. Pt appears to be mostly limited by pain and activity tolerance. Pt continues to benefit from skilled PT to address stated deficits.   Outcome Measures       Row Name 06/05/24 1200 06/04/24 1404 06/03/24 1416       How much help from another person do you currently need...    Turning from your back to your side while in flat bed without using bedrails? 4  -VK 4  -VK 4  -WM    Moving from lying on back to sitting on the side of a flat bed without bedrails? 3  -VK 3  -VK 3  -WM    Moving to and from a bed to a chair (including a wheelchair)? 2  -VK 2  -VK 2  -WM    Standing up from a chair using your arms (e.g., wheelchair, bedside chair)? 2  -VK 2  -VK 2  -WM    Climbing 3-5 steps with a railing? 1  -VK 1  -VK 1  -WM    To walk in hospital room? 2  -VK 2  -VK 2  -WM    AM-PAC 6 Clicks Score (PT) 14  -VK 14  -VK 14  -WM    Highest Level of Mobility Goal 4 --> Transfer to chair/commode  -VK 4 --> Transfer to chair/commode  -VK 4 --> Transfer to chair/commode  -WM              User Key  (r) = Recorded By, (t) = Taken By, (c) = Cosigned By      Initials Name Provider Type     Carson Inman PTA Physical Therapist Assistant    Anya Palumbo PTA Physical Therapist Assistant                     Time Calculation:    PT Charges       Row Name 06/05/24 1243             Time Calculation    PT Received On 06/05/24  -VK         Timed Charges    34343 - PT Therapeutic Activity Minutes 39  -VK         SNF Physical Therapy Minutes    Skilled Minutes- PT 39 min  -VK         Total Minutes    Timed Charges Total Minutes 39  -VK       Total Minutes 39  -VK                User Key  (r) = Recorded By, (t) = Taken By, (c) = Cosigned By      Initials Name Provider Type    Anya Palumbo PTA Physical  Therapist Assistant                  Therapy Charges for Today       Code Description Service Date Service Provider Modifiers Qty    40766200368 HC PT THERAPEUTIC ACT EA 15 MIN 6/4/2024 Anya Sam, PTA GP 2    37526393121 HC PT THER PROC EA 15 MIN 6/4/2024 Anya Sam, PTA GP 1    27128552892 HC PT THERAPEUTIC ACT EA 15 MIN 6/5/2024 Anya Sam, PTA GP 3            PT G-Codes  Outcome Measure Options: AM-PAC 6 Clicks Daily Activity (OT), Optimal Instrument  AM-PAC 6 Clicks Score (PT): 14  AM-PAC 6 Clicks Score (OT): 15    Anya Sam PTA  6/5/2024

## 2024-06-06 LAB
ALBUMIN SERPL-MCNC: 3 G/DL (ref 3.5–5.2)
ANION GAP SERPL CALCULATED.3IONS-SCNC: 9.5 MMOL/L (ref 5–15)
BUN SERPL-MCNC: 17 MG/DL (ref 8–23)
BUN/CREAT SERPL: 30.4 (ref 7–25)
CALCIUM SPEC-SCNC: 8.2 MG/DL (ref 8.6–10.5)
CHLORIDE SERPL-SCNC: 105 MMOL/L (ref 98–107)
CO2 SERPL-SCNC: 22.5 MMOL/L (ref 22–29)
CREAT SERPL-MCNC: 0.56 MG/DL (ref 0.76–1.27)
DEPRECATED RDW RBC AUTO: 48.5 FL (ref 37–54)
EGFRCR SERPLBLD CKD-EPI 2021: 109.4 ML/MIN/1.73
ERYTHROCYTE [DISTWIDTH] IN BLOOD BY AUTOMATED COUNT: 15.1 % (ref 12.3–15.4)
GLUCOSE BLDC GLUCOMTR-MCNC: 125 MG/DL (ref 70–99)
GLUCOSE BLDC GLUCOMTR-MCNC: 133 MG/DL (ref 70–99)
GLUCOSE BLDC GLUCOMTR-MCNC: 99 MG/DL (ref 70–99)
GLUCOSE SERPL-MCNC: 149 MG/DL (ref 65–99)
HCT VFR BLD AUTO: 35.5 % (ref 37.5–51)
HGB BLD-MCNC: 11.3 G/DL (ref 13–17.7)
MCH RBC QN AUTO: 27.8 PG (ref 26.6–33)
MCHC RBC AUTO-ENTMCNC: 31.8 G/DL (ref 31.5–35.7)
MCV RBC AUTO: 87.4 FL (ref 79–97)
PHOSPHATE SERPL-MCNC: 3.8 MG/DL (ref 2.5–4.5)
PLATELET # BLD AUTO: 84 10*3/MM3 (ref 140–450)
PMV BLD AUTO: 12.6 FL (ref 6–12)
POTASSIUM SERPL-SCNC: 4.2 MMOL/L (ref 3.5–5.2)
RBC # BLD AUTO: 4.06 10*6/MM3 (ref 4.14–5.8)
SODIUM SERPL-SCNC: 137 MMOL/L (ref 136–145)
WBC NRBC COR # BLD AUTO: 4.16 10*3/MM3 (ref 3.4–10.8)

## 2024-06-06 PROCEDURE — 85027 COMPLETE CBC AUTOMATED: CPT | Performed by: PHYSICIAN ASSISTANT

## 2024-06-06 PROCEDURE — 80069 RENAL FUNCTION PANEL: CPT | Performed by: PHYSICIAN ASSISTANT

## 2024-06-06 PROCEDURE — 82948 REAGENT STRIP/BLOOD GLUCOSE: CPT

## 2024-06-06 PROCEDURE — 97530 THERAPEUTIC ACTIVITIES: CPT

## 2024-06-06 PROCEDURE — 82948 REAGENT STRIP/BLOOD GLUCOSE: CPT | Performed by: INTERNAL MEDICINE

## 2024-06-06 PROCEDURE — 63710000001 INSULIN GLARGINE PER 5 UNITS: Performed by: INTERNAL MEDICINE

## 2024-06-06 PROCEDURE — 63710000001 INSULIN GLARGINE PER 5 UNITS: Performed by: PHYSICIAN ASSISTANT

## 2024-06-06 PROCEDURE — 63710000001 ONDANSETRON ODT 4 MG TABLET DISPERSIBLE: Performed by: INTERNAL MEDICINE

## 2024-06-06 PROCEDURE — 63710000001 INSULIN LISPRO (HUMAN) PER 5 UNITS: Performed by: PHYSICIAN ASSISTANT

## 2024-06-06 RX ADMIN — ATORVASTATIN CALCIUM 10 MG: 10 TABLET, FILM COATED ORAL at 21:46

## 2024-06-06 RX ADMIN — INSULIN GLARGINE 22 UNITS: 100 INJECTION, SOLUTION SUBCUTANEOUS at 21:45

## 2024-06-06 RX ADMIN — Medication 1 APPLICATION: at 11:46

## 2024-06-06 RX ADMIN — EMPAGLIFLOZIN 10 MG: 10 TABLET, FILM COATED ORAL at 08:53

## 2024-06-06 RX ADMIN — FUROSEMIDE 40 MG: 40 TABLET ORAL at 08:54

## 2024-06-06 RX ADMIN — INSULIN GLARGINE 35 UNITS: 100 INJECTION, SOLUTION SUBCUTANEOUS at 08:55

## 2024-06-06 RX ADMIN — Medication 1 APPLICATION: at 21:49

## 2024-06-06 RX ADMIN — CYANOCOBALAMIN TAB 500 MCG 1000 MCG: 500 TAB at 08:54

## 2024-06-06 RX ADMIN — HYDROCORTISONE 2.5%: 25 CREAM TOPICAL at 08:52

## 2024-06-06 RX ADMIN — APIXABAN 5 MG: 5 TABLET, FILM COATED ORAL at 21:45

## 2024-06-06 RX ADMIN — BACLOFEN 10 MG: 10 TABLET ORAL at 16:50

## 2024-06-06 RX ADMIN — Medication 500 MG: at 08:54

## 2024-06-06 RX ADMIN — ACETAMINOPHEN 650 MG: 325 TABLET ORAL at 23:24

## 2024-06-06 RX ADMIN — INSULIN LISPRO 3 UNITS: 100 INJECTION, SOLUTION INTRAVENOUS; SUBCUTANEOUS at 21:45

## 2024-06-06 RX ADMIN — DICLOFENAC SODIUM 4 G: 10 GEL TOPICAL at 21:48

## 2024-06-06 RX ADMIN — DICLOFENAC SODIUM 4 G: 10 GEL TOPICAL at 08:52

## 2024-06-06 RX ADMIN — BACLOFEN 10 MG: 10 TABLET ORAL at 07:14

## 2024-06-06 RX ADMIN — FERROUS SULFATE TAB 325 MG (65 MG ELEMENTAL FE) 325 MG: 325 (65 FE) TAB at 08:54

## 2024-06-06 RX ADMIN — PREGABALIN 100 MG: 100 CAPSULE ORAL at 16:43

## 2024-06-06 RX ADMIN — LISINOPRIL 2.5 MG: 2.5 TABLET ORAL at 08:54

## 2024-06-06 RX ADMIN — DICLOFENAC SODIUM 4 G: 10 GEL TOPICAL at 13:36

## 2024-06-06 RX ADMIN — OXYCODONE AND ACETAMINOPHEN 1 TABLET: 10; 325 TABLET ORAL at 07:14

## 2024-06-06 RX ADMIN — APIXABAN 5 MG: 5 TABLET, FILM COATED ORAL at 08:54

## 2024-06-06 RX ADMIN — SERTRALINE HYDROCHLORIDE 50 MG: 50 TABLET ORAL at 21:46

## 2024-06-06 RX ADMIN — MONTELUKAST 10 MG: 10 TABLET, FILM COATED ORAL at 21:46

## 2024-06-06 RX ADMIN — CETIRIZINE HYDROCHLORIDE 10 MG: 10 TABLET, FILM COATED ORAL at 08:54

## 2024-06-06 RX ADMIN — ACETAMINOPHEN 650 MG: 325 TABLET ORAL at 02:38

## 2024-06-06 RX ADMIN — OXYCODONE AND ACETAMINOPHEN 1 TABLET: 10; 325 TABLET ORAL at 16:48

## 2024-06-06 RX ADMIN — Medication 500 MG: at 21:46

## 2024-06-06 RX ADMIN — PREGABALIN 100 MG: 100 CAPSULE ORAL at 08:54

## 2024-06-06 RX ADMIN — DICLOFENAC SODIUM 4 G: 10 GEL TOPICAL at 02:39

## 2024-06-06 RX ADMIN — ACETAMINOPHEN 650 MG: 325 TABLET ORAL at 13:35

## 2024-06-06 RX ADMIN — ONDANSETRON 4 MG: 4 TABLET, ORALLY DISINTEGRATING ORAL at 07:14

## 2024-06-06 RX ADMIN — IBUPROFEN 400 MG: 400 TABLET ORAL at 17:44

## 2024-06-06 RX ADMIN — PREGABALIN 100 MG: 100 CAPSULE ORAL at 21:46

## 2024-06-06 RX ADMIN — Medication 10 MG: at 21:46

## 2024-06-06 NOTE — PLAN OF CARE
Goal Outcome Evaluation:         Pain remains an issue with the pt.  Refused the ISB today and refused to get up to chair.  VS remain stable.  Wound care came to see pt.  Skin care completed multiple times today.  Stool remains loose/soft with several Bms today.

## 2024-06-06 NOTE — PLAN OF CARE
Goal Outcome Evaluation:  Plan of Care Reviewed With: patient        Progress: improving  Outcome Evaluation: Patient is alert and oriented x4. Pleasant and cooperative. Able to make all needs known. Percocet, baclofen, motrin, and Tylenol given this shift for pain to left hip. PRN volteran applied as well to left shoulder and elbow. Patient refuses turns except for when getting cleaned up. Patient had several BM this shift and periarea is red from constant wiping. Cream applied several times. Skin care to bilateral legs completed as ordered. Continue current POC

## 2024-06-06 NOTE — SIGNIFICANT NOTE
Wound Eval / Progress Noted    KEO Dominguez     Patient Name: Jose Shaikh  : 1960  MRN: [id number]  Today's Date: 2024                 Admit Date: 2023    Visit Dx:    ICD-10-CM ICD-9-CM   1. Difficulty in walking  R26.2 719.7   2. Type 2 diabetes mellitus with other specified complication, unspecified whether long term insulin use  E11.69 250.80         Debility    Ulcer of right foot    Ulcerated, foot, left, limited to breakdown of skin    Onychomycosis        Past Medical History:   Diagnosis Date    Absence of toe of right foot     Acute osteomyelitis of left calcaneus  2021    Anxiety and depression     Arthritis     Cancer     Chronic pain     STATES HIS PAIN IS 10/10 AAT    Claustrophobia     Corns and callus     Diabetic ulcer of left heel associated with type 2 DM 2021    Diabetic ulcer of left heel associated with type 2 DM 2021    Diabetic ulcer of right midfoot associated with type 2 DM 2021    Difficulty walking     Essential hypertension 2021    Hammertoe     Hyperlipidemia LDL goal <100 2021    Ingrown toenail     Obesity     Paroxysmal atrial fibrillation 2021    Polyneuropathy     Pressure ulcer, stage 1     Tinea unguium     Type 2 diabetes mellitus with polyneuropathy         Past Surgical History:   Procedure Laterality Date    CYST REMOVAL      center of back; benign    EYE SURGERY      INCISION AND DRAINAGE ABSCESS      back    INCISION AND DRAINAGE LEG Right 12/10/2021    Procedure: INCISION AND DRAINAGE LOWER EXTREMITY;  Surgeon: Ash Leyva DPM;  Location: [facility] MAIN OR;  Service: Podiatry;  Laterality: Right;    OTHER SURGICAL HISTORY      Surgical clips left foot    TOE SURGERY Right     Removal of 5th toe    TRANS METATARSAL AMPUTATION Right 2021    Procedure: AMPUTATION TRANS METATARSAL;  Surgeon: Ash Leyva DPM;  Location: [facility] MAIN OR;  Service: Podiatry;  Laterality: Right;    VASCULAR  "SURGERY      WRIST SURGERY Left     repair of injury         Physical Assessment:  Rash 04/06/24 1659 Left calf plaque (Active)   Wound Image   06/06/24 0100   Distribution localized 06/06/24 1402   Color red 06/06/24 1402   Configuration/Shape asymmetric 06/06/24 1402   Borders irregular 06/06/24 1402   Characteristics flat 06/06/24 1402       Rash 04/17/24 1443 Right lower leg macular (Active)   Wound Image   06/06/24 0100   Distribution localized 06/06/24 1402   Color red 06/06/24 1402   Configuration/Shape asymmetric 06/06/24 1402   Borders irregular 06/06/24 1402   Characteristics flat 06/06/24 1402       Wound 06/06/24 1402 Right distal foot Blisters (Active)   Wound Image   06/06/24 1402   Dressing Appearance open to air 06/06/24 1402   Closure None 06/06/24 1402   Base red;maroon/purple 06/06/24 1402   Periwound blistered;intact 06/06/24 1402   Periwound Temperature warm 06/06/24 1402   Periwound Skin Turgor soft 06/06/24 1402   Edges rolled/closed 06/06/24 1402   Drainage Amount none 06/06/24 1402   Care, Wound cleansed with;sterile normal saline 06/06/24 1402   Dressing Care dressing applied;petroleum-based;non-adherent;gauze;silicone;border dressing 06/06/24 1402   Periwound Care absorptive dressing applied 06/06/24 1402      Wound Check / Follow-up: Patient seen today for wound follow-up. Patient is awake and alert at time of visit. He is agreeable to assessment except assessment of sacral area / bilateral gluteal aspects. Patient states he is on bedpan at this time. Told patient I could assess once he is done. Patient states \"no because this is a long process.\"    Right distal foot with new blistering. Trauma versus pressure suspected. Darkened discoloration also present. Cleansed with normal saline and gauze. Recommending daily dressing changes with non-adherent petroleum based gauze and silicone border dressing.    Left plantar foot remains with deep grooves from prior surgeries. No open tissue noted. " Recommending to continue skin care and skin protection.    Right lateral abdomen abrasion closed at this time with intact red tissue. Recommending to leave open to air and perform skin care / skin protection.    Moisture remains within skin folds. Recommending to continue skin care with application of cornstarch powder.      Impression: Trauma versus pressure to right distal foot. Dry skin. Resolved abrasion. Moisture within skin folds.      Short term goals: Regain skin integrity, skin protection, moisture prevention, pressure reduction, skin care, topical treatment, daily dressing changes.    Dottie Guevara RN    6/6/2024    17:55 EDT

## 2024-06-06 NOTE — PLAN OF CARE
"Goal Outcome Evaluation:  Plan of Care Reviewed With: patient        Progress: no change  Outcome Evaluation: Patient is alert and oriented x4. Given PRN Percocet and Baclofen at 1301 for c/o left hip pain. Meds effective but stated \"It only last a few hours.\" Given PRN Tylenol at 0729 and again at 1706 for break though left hip pain. Med effective.  Refused bathing and [place]eno tx to bilateral lower extremities. Con't to refuse turns. This morning BP was low. Checked multiple times manually. (See vitals charting.) Lasix and Lisinopril held per parameters and provider notified. See order changes. Voices needs. Con't current POC.                               "

## 2024-06-06 NOTE — THERAPY TREATMENT NOTE
SNF - Occupational Therapy Treatment Note  KE[facility] Dominguez    Patient Name: Jose Shaikh  : 1960    MRN: [id number]                              Today's Date: 2024       Admit Date: 2023    Visit Dx:     ICD-10-CM ICD-9-CM   1. Difficulty in walking  R26.2 719.7   2. Type 2 diabetes mellitus with other specified complication, unspecified whether long term insulin use  E11.69 250.80     Patient Active Problem List   Diagnosis    Diabetic ulcer of left heel associated with type 2 DM    Acute osteomyelitis of left calcaneus     Diabetic ulcer of left heel associated with type 2 DM    Diabetic ulcer of right midfoot associated with type 2 DM    Paroxysmal atrial fibrillation    Essential hypertension    Hyperlipidemia LDL goal <100    Cellulitis and abscess of foot    High alkaline phosphatase level    Osteomyelitis    Onychomycosis    Onychocryptosis    Foot pain, bilateral    Osteomyelitis of foot, right, acute    Cellulitis of right foot    Type 2 diabetes mellitus, with long-term current use of insulin    Class 3 severe obesity due to excess calories with serious comorbidity and body mass index (BMI) of 45.0 to 49.9 in adult    Anxiety disorder, unspecified    Claustrophobia    Dependence on wheelchair    Depression, unspecified    Long term (current) use of anticoagulants    Long term (current) use of oral hypoglycemic drugs    Wound of foot    Ulcer of right foot    Orthostatic hypotension    Other chronic osteomyelitis, right ankle and foot    Personal history of nicotine dependence    Thrombocytopenia, unspecified    Unspecified open wound, right foot, initial encounter    Diabetic foot infection    Subacute osteomyelitis of right foot    Right foot pain    Sepsis    Onychomycosis    Foot pain, left    Impaired mobility and ADLs    Absence of toe of right foot    Corns and callosity    Disability of walking    Fracture    Limb swelling    Polyneuropathy    Pressure ulcer, stage 1    Shortness of  breath    Generalized weakness    Debility    Ulcerated, foot, left, limited to breakdown of skin    Onychomycosis     Past Medical History:   Diagnosis Date    Absence of toe of right foot     Acute osteomyelitis of left calcaneus  08/18/2021    Anxiety and depression     Arthritis     Cancer     Chronic pain     STATES HIS PAIN IS 10/10 AAT    Claustrophobia     Corns and callus     Diabetic ulcer of left heel associated with type 2 DM 08/18/2021    Diabetic ulcer of left heel associated with type 2 DM 07/06/2021    Diabetic ulcer of right midfoot associated with type 2 DM 08/18/2021    Difficulty walking     Essential hypertension 08/31/2021    Hammertoe     Hyperlipidemia LDL goal <100 08/31/2021    Ingrown toenail     Obesity     Paroxysmal atrial fibrillation 08/31/2021    Polyneuropathy     Pressure ulcer, stage 1     Tinea unguium     Type 2 diabetes mellitus with polyneuropathy      Past Surgical History:   Procedure Laterality Date    CYST REMOVAL      center of back; benign    EYE SURGERY      INCISION AND DRAINAGE ABSCESS      back    INCISION AND DRAINAGE LEG Right 12/10/2021    Procedure: INCISION AND DRAINAGE LOWER EXTREMITY;  Surgeon: Ash Leyva DPM;  Location: [facility] MAIN OR;  Service: Podiatry;  Laterality: Right;    OTHER SURGICAL HISTORY      Surgical clips left foot    TOE SURGERY Right     Removal of 5th toe    TRANS METATARSAL AMPUTATION Right 12/02/2021    Procedure: AMPUTATION TRANS METATARSAL;  Surgeon: Ash Leyva DPM;  Location: [facility] MAIN OR;  Service: Podiatry;  Laterality: Right;    VASCULAR SURGERY      WRIST SURGERY Left     repair of injury      General Information       Row Name 06/06/24 1048          OT Time and Intention    Document Type therapy note (daily note)  -ES     Mode of Treatment individual therapy;occupational therapy  -ES       Row Name 06/06/24 1048          General Information    Existing Precautions/Restrictions fall  -ES       Row Name  06/06/24 1048          Cognition    Orientation Status (Cognition) oriented x 3  patient cooperative, agreeable to therapy participation.  -ES       Row Name 06/06/24 1048          Safety Issues, Functional Mobility    Impairments Affecting Function (Mobility) balance;endurance/activity tolerance;strength;pain  -ES               User Key  (r) = Recorded By, (t) = Taken By, (c) = Cosigned By      Initials Name Provider Type    ES Katlyn Mantilla, OTR/L, CSRS Occupational Therapist                     Mobility/ADL's       Row Name 06/06/24 1049          Bed Mobility    Bed Mobility supine-sit;sit-supine  -ES     Supine-Sit Taylor (Bed Mobility) minimum assist (75% patient effort);1 person assist  -ES     Sit-Supine Taylor (Bed Mobility) moderate assist (50% patient effort);2 person assist  -ES     Bed Mobility, Safety Issues decreased use of legs for bridging/pushing  -ES     Assistive Device (Bed Mobility) bed rails;head of bed elevated;overhead trapeze  -ES     Comment, (Bed Mobility) Patient able to complete bed mobility min A this session with use of overhead trapeze, requires min A for trunk elevation to edge of bed. Patient requires moderate assist with sit <> sup, assist provided for LE navigation back to bed  -ES       Row Name 06/06/24 1049          Transfers    Transfers sit-stand transfer;stand-sit transfer  -ES     Comment, (Transfers) STS x2 during therapy session. Patient declines further therapy participation secondary to fear of falling this session. Continued to educate on alternate transfer techniques and hand placement for patient safety.  -ES       Row Name 06/06/24 1049          Sit-Stand Transfer    Sit-Stand Taylor (Transfers) moderate assist (50% patient effort);2 person assist  -ES     Assistive Device (Sit-Stand Transfers) walker, front-wheeled;bariatric  -ES       Row Name 06/06/24 1049          Stand-Sit Transfer    Stand-Sit Taylor (Transfers) moderate assist (50%  patient effort);2 person assist  -ES     Assistive Device (Stand-Sit Transfers) walker, front-wheeled;bariatric  -ES       Row Name 06/06/24 1049          Functional Mobility    Functional Mobility- Comment OT offered functional mobility during therapy session, patient declined  -ES               User Key  (r) = Recorded By, (t) = Taken By, (c) = Cosigned By      Initials Name Provider Type    Katlyn Gutierres, OTR/L, NADEGES Occupational Therapist                   Obj/Interventions       Row Name 06/06/24 1056          Motor Skills    Motor Skills functional endurance  -ES     Functional Endurance P. Patient tolerates x2 trials of sit <> stand prior to terminating therapy session.  -ES       Row Name 06/06/24 1056          Balance    Balance Assessment sitting dynamic balance;standing static balance  -ES     Dynamic Sitting Balance supervision  -ES     Position, Sitting Balance unsupported;sitting edge of bed  -ES     Static Standing Balance minimal assist;2-person assist  -ES     Position/Device Used, Standing Balance supported;walker, front-wheeled  -ES               User Key  (r) = Recorded By, (t) = Taken By, (c) = Cosigned By      Initials Name Provider Type    Katlyn Gutierres, OTR/L, CSRS Occupational Therapist                   Goals/Plan    No documentation.                  Clinical Impression       Row Name 06/06/24 1057          Plan of Care Review    Plan of Care Reviewed With patient  -ES     Progress no change  -ES     Outcome Evaluation Patient with fair participation in therapy session, limited secondary to patient fear of falling. Patient continues to decline functional mobility, agreeable to static standing only. Patient does demonstrate incresed static standing time, 45 seconds x2 trials this session.Patient has plateaued and does not demonstrate agreeability to progression of any therapeutic intervention, despite attempts from multiple therapists and angles for intervention.  -ES       Row Name  06/06/24 1057          Positioning and Restraints    Pre-Treatment Position in bed  -ES     Post Treatment Position bed  -ES     In Bed supine;call light within reach;encouraged to call for assist  -ES               User Key  (r) = Recorded By, (t) = Taken By, (c) = Cosigned By      Initials Name Provider Type    Katlyn Gutierres OTR/L, NADEGES Occupational Therapist                   Outcome Measures       Row Name 06/06/24 1102          How much help from another is currently needed...    Putting on and taking off regular lower body clothing? 1  -ES     Bathing (including washing, rinsing, and drying) 2  -ES     Toileting (which includes using toilet bed pan or urinal) 1  -ES     Putting on and taking off regular upper body clothing 3  -ES     Taking care of personal grooming (such as brushing teeth) 3  -ES     Eating meals 4  -ES     AM-PAC 6 Clicks Score (OT) 14  -ES       Row Name 06/06/24 0100          How much help from another person do you currently need...    Turning from your back to your side while in flat bed without using bedrails? 3  -KM     Moving from lying on back to sitting on the side of a flat bed without bedrails? 3  -KM     Moving to and from a bed to a chair (including a wheelchair)? 2  -KM     Standing up from a chair using your arms (e.g., wheelchair, bedside chair)? 2  -KM     Climbing 3-5 steps with a railing? 1  -KM     To walk in hospital room? 2  -KM     AM-PAC 6 Clicks Score (PT) 13  -KM     Highest Level of Mobility Goal 4 --> Transfer to chair/commode  -KM       Row Name 06/06/24 1102          Functional Assessment    Outcome Measure Options AM-PAC 6 Clicks Daily Activity (OT)  -ES               User Key  (r) = Recorded By, (t) = Taken By, (c) = Cosigned By      Initials Name Provider Type    Katlyn Gutierres OTR/L, NIKIA Occupational Therapist    Irene Sigala, RN Registered Nurse                  Section G              Section GG                         Occupational Therapy  Education       Title: PT OT SLP Therapies (Done)       Topic: Occupational Therapy (Done)       Point: ADL training (Done)       Description:   Instruct learner(s) on proper safety adaptation and remediation techniques during self care or transfers.   Instruct in proper use of assistive devices.                  Learning Progress Summary             Patient Acceptance, E,TB, VU by RM at 5/28/2024 0439    Acceptance, E,D, DU by PG at 4/24/2024 1039    Acceptance, E,TB, VU by RM at 2/14/2024 0518    Acceptance, E,TB, VU by RM at 1/9/2024 0420    Acceptance, E, VU by CR at 12/30/2023 1750    Acceptance, E,TB, VU by RM at 11/30/2023 0420    Acceptance, E,D, DU by PG at 11/7/2023 0932                         Point: Home exercise program (Done)       Description:   Instruct learner(s) on appropriate technique for monitoring, assisting and/or progressing therapeutic exercises/activities.                  Learning Progress Summary             Patient Acceptance, E,TB, VU by RM at 5/28/2024 0439    Acceptance, E,D, DU by PG at 4/24/2024 1039    Acceptance, E,TB, VU by RM at 2/14/2024 0518    Acceptance, E,TB, VU by RM at 1/9/2024 0420    Acceptance, E, VU by CR at 12/30/2023 1750    Acceptance, E,TB, VU by RM at 11/30/2023 0420    Acceptance, E,D, DU by PG at 11/7/2023 0932                         Point: Precautions (Done)       Description:   Instruct learner(s) on prescribed precautions during self-care and functional transfers.                  Learning Progress Summary             Patient Acceptance, E,TB, VU by RM at 5/28/2024 0439    Acceptance, E,D, DU by PG at 4/24/2024 1039    Acceptance, E,TB, VU by RM at 2/14/2024 0518    Acceptance, E,TB, VU by RM at 1/9/2024 0420    Acceptance, E, VU by CR at 12/30/2023 1750    Acceptance, E,TB, VU by RM at 11/30/2023 0420    Acceptance, E,D, DU by PG at 11/7/2023 0932                         Point: Body mechanics (Done)       Description:   Instruct learner(s) on proper  positioning and spine alignment during self-care, functional mobility activities and/or exercises.                  Learning Progress Summary             Patient Acceptance, E,TB, VU by RM at 5/28/2024 0439    Acceptance, E,D, DU by PG at 4/24/2024 1039    Acceptance, E,TB, VU by RM at 2/14/2024 0518    Acceptance, E,TB, VU by RM at 1/9/2024 0420    Acceptance, E, VU by CR at 12/30/2023 1750    Acceptance, E,TB, VU by RM at 11/30/2023 0420    Acceptance, E,D, DU by PG at 11/7/2023 0932                                         User Key       Initials Effective Dates Name Provider Type Discipline     06/08/21 -  Bharath Berg, RN Registered Nurse Nurse    CR 06/13/22 -  Adilson Baker LPN Licensed Nurse Nurse    PG 06/16/21 -  Kodak Matos OT Occupational Therapist OT                  OT Recommendation and Plan  Planned Therapy Interventions (OT): activity tolerance training, BADL retraining, functional balance retraining, occupation/activity based interventions, patient/caregiver education/training, ROM/therapeutic exercise, strengthening exercise, transfer/mobility retraining  Therapy Frequency (OT): 5 times/wk  Plan of Care Review  Plan of Care Reviewed With: patient  Progress: no change  Outcome Evaluation: Patient with fair participation in therapy session, limited secondary to patient fear of falling. Patient continues to decline functional mobility, agreeable to static standing only. Patient does demonstrate incresed static standing time, 45 seconds x2 trials this session.Patient has plateaued and does not demonstrate agreeability to progression of any therapeutic intervention, despite attempts from multiple therapists and angles for intervention.     Time Calculation:         Time Calculation- OT       Row Name 06/06/24 1102             Time Calculation- OT    OT Received On 06/06/24  -ES      OT Goal Re-Cert Due Date 06/12/24  -ES         Timed Charges    28957 - OT Therapeutic Activity Minutes 28  -ES          SNF Occupational Therapy Minutes    Skilled Minutes- OT 28 min  -ES         Total Minutes    Timed Charges Total Minutes 28  -ES       Total Minutes 28  -ES                User Key  (r) = Recorded By, (t) = Taken By, (c) = Cosigned By      Initials Name Provider Type    ES Katlyn Mantilla, OTR/L, CSRS Occupational Therapist                  Therapy Charges for Today       Code Description Service Date Service Provider Modifiers Qty    79298427374 HC OT THERAPEUTIC ACT EA 15 MIN 6/5/2024 Katlyn Mantilla, OTR/L, CSRS GO 2    34022962447 HC OT THERAPEUTIC ACT EA 15 MIN 6/6/2024 Katlyn Mantilla, OTR/L, CSRS GO 2                 Katlyn Mantilla OTR/L, CSRS  6/6/2024

## 2024-06-06 NOTE — PLAN OF CARE
PT note:    Pt has made little to no progress since his first plan of care set on 4/24.  Due to this lack of significant progress and plateau in function pt is no longer appropriate for skilled PT services.  Recommend pt continue his independent exercise program and transfer out of bed with the nursing staff daily/as tolerated to maintain his current level of function.  At this time PT will sign off services but should pts condition change or were he to get his total hip replacement we would gladly reevaluate at that time.

## 2024-06-07 LAB
GLUCOSE BLDC GLUCOMTR-MCNC: 101 MG/DL (ref 70–99)
GLUCOSE BLDC GLUCOMTR-MCNC: 138 MG/DL (ref 70–99)
GLUCOSE BLDC GLUCOMTR-MCNC: 145 MG/DL (ref 70–99)
GLUCOSE BLDC GLUCOMTR-MCNC: 167 MG/DL (ref 70–99)
GLUCOSE BLDC GLUCOMTR-MCNC: 198 MG/DL (ref 70–99)

## 2024-06-07 PROCEDURE — 82948 REAGENT STRIP/BLOOD GLUCOSE: CPT | Performed by: INTERNAL MEDICINE

## 2024-06-07 PROCEDURE — 82948 REAGENT STRIP/BLOOD GLUCOSE: CPT

## 2024-06-07 PROCEDURE — 63710000001 INSULIN LISPRO (HUMAN) PER 5 UNITS: Performed by: PHYSICIAN ASSISTANT

## 2024-06-07 PROCEDURE — 63710000001 INSULIN GLARGINE PER 5 UNITS: Performed by: INTERNAL MEDICINE

## 2024-06-07 PROCEDURE — 63710000001 INSULIN GLARGINE PER 5 UNITS: Performed by: PHYSICIAN ASSISTANT

## 2024-06-07 RX ADMIN — ATORVASTATIN CALCIUM 10 MG: 10 TABLET, FILM COATED ORAL at 21:53

## 2024-06-07 RX ADMIN — BACLOFEN 10 MG: 10 TABLET ORAL at 16:55

## 2024-06-07 RX ADMIN — INSULIN LISPRO 3 UNITS: 100 INJECTION, SOLUTION INTRAVENOUS; SUBCUTANEOUS at 21:52

## 2024-06-07 RX ADMIN — POLYETHYLENE GLYCOL 400 AND PROPYLENE GLYCOL 1 DROP: 4; 3 SOLUTION/ DROPS OPHTHALMIC at 11:05

## 2024-06-07 RX ADMIN — DICLOFENAC SODIUM 4 G: 10 GEL TOPICAL at 21:53

## 2024-06-07 RX ADMIN — CETIRIZINE HYDROCHLORIDE 10 MG: 10 TABLET, FILM COATED ORAL at 08:33

## 2024-06-07 RX ADMIN — DICLOFENAC SODIUM 4 G: 10 GEL TOPICAL at 08:34

## 2024-06-07 RX ADMIN — OXYCODONE AND ACETAMINOPHEN 1 TABLET: 10; 325 TABLET ORAL at 08:39

## 2024-06-07 RX ADMIN — INSULIN GLARGINE 22 UNITS: 100 INJECTION, SOLUTION SUBCUTANEOUS at 21:52

## 2024-06-07 RX ADMIN — Medication 1 APPLICATION: at 21:54

## 2024-06-07 RX ADMIN — DICLOFENAC SODIUM 4 G: 10 GEL TOPICAL at 01:52

## 2024-06-07 RX ADMIN — LISINOPRIL 2.5 MG: 2.5 TABLET ORAL at 08:33

## 2024-06-07 RX ADMIN — Medication 500 MG: at 21:53

## 2024-06-07 RX ADMIN — CYANOCOBALAMIN TAB 500 MCG 1000 MCG: 500 TAB at 08:33

## 2024-06-07 RX ADMIN — PREGABALIN 100 MG: 100 CAPSULE ORAL at 16:55

## 2024-06-07 RX ADMIN — MONTELUKAST 10 MG: 10 TABLET, FILM COATED ORAL at 21:53

## 2024-06-07 RX ADMIN — PREGABALIN 100 MG: 100 CAPSULE ORAL at 21:53

## 2024-06-07 RX ADMIN — APIXABAN 5 MG: 5 TABLET, FILM COATED ORAL at 08:33

## 2024-06-07 RX ADMIN — BACLOFEN 10 MG: 10 TABLET ORAL at 00:39

## 2024-06-07 RX ADMIN — DICLOFENAC SODIUM 4 G: 10 GEL TOPICAL at 16:56

## 2024-06-07 RX ADMIN — INSULIN GLARGINE 35 UNITS: 100 INJECTION, SOLUTION SUBCUTANEOUS at 08:33

## 2024-06-07 RX ADMIN — OXYCODONE AND ACETAMINOPHEN 1 TABLET: 10; 325 TABLET ORAL at 16:55

## 2024-06-07 RX ADMIN — OXYCODONE AND ACETAMINOPHEN 1 TABLET: 10; 325 TABLET ORAL at 00:39

## 2024-06-07 RX ADMIN — FERROUS SULFATE TAB 325 MG (65 MG ELEMENTAL FE) 325 MG: 325 (65 FE) TAB at 08:33

## 2024-06-07 RX ADMIN — BACLOFEN 10 MG: 10 TABLET ORAL at 08:39

## 2024-06-07 RX ADMIN — SERTRALINE HYDROCHLORIDE 50 MG: 50 TABLET ORAL at 21:53

## 2024-06-07 RX ADMIN — PREGABALIN 100 MG: 100 CAPSULE ORAL at 08:33

## 2024-06-07 RX ADMIN — Medication 500 MG: at 08:33

## 2024-06-07 RX ADMIN — Medication 10 MG: at 21:53

## 2024-06-07 RX ADMIN — FUROSEMIDE 40 MG: 40 TABLET ORAL at 08:33

## 2024-06-07 RX ADMIN — IBUPROFEN 400 MG: 400 TABLET ORAL at 11:02

## 2024-06-07 RX ADMIN — EMPAGLIFLOZIN 10 MG: 10 TABLET, FILM COATED ORAL at 08:33

## 2024-06-07 RX ADMIN — APIXABAN 5 MG: 5 TABLET, FILM COATED ORAL at 21:53

## 2024-06-07 NOTE — PLAN OF CARE
Goal Outcome Evaluation:  Plan of Care Reviewed With: patient        Progress: no change  Outcome Evaluation: Patient alert and oriented able to make needs known. Refused skin care to his ble this shift stating it only needed to be done once a day. Blood sugar elevated at bedtime at 198. Coverage given per orders. Pain medications given x1 this shift. No concerns at this time. Continue current POC.

## 2024-06-07 NOTE — THERAPY DISCHARGE NOTE
SNF - Occupational Therapy Discharge   [place]    Patient Name: Jose Shaikh  : 1960    MRN: [id number]                              Today's Date: 2024       Admit Date: 2023    Visit Dx:     ICD-10-CM ICD-9-CM   1. Difficulty in walking  R26.2 719.7   2. Type 2 diabetes mellitus with other specified complication, unspecified whether long term insulin use  E11.69 250.80     Patient Active Problem List   Diagnosis    Diabetic ulcer of left heel associated with type 2 DM    Acute osteomyelitis of left calcaneus     Diabetic ulcer of left heel associated with type 2 DM    Diabetic ulcer of right midfoot associated with type 2 DM    Paroxysmal atrial fibrillation    Essential hypertension    Hyperlipidemia LDL goal <100    Cellulitis and abscess of foot    High alkaline phosphatase level    Osteomyelitis    Onychomycosis    Onychocryptosis    Foot pain, bilateral    Osteomyelitis of foot, right, acute    Cellulitis of right foot    Type 2 diabetes mellitus, with long-term current use of insulin    Class 3 severe obesity due to excess calories with serious comorbidity and body mass index (BMI) of 45.0 to 49.9 in adult    Anxiety disorder, unspecified    Claustrophobia    Dependence on wheelchair    Depression, unspecified    Long term (current) use of anticoagulants    Long term (current) use of oral hypoglycemic drugs    Wound of foot    Ulcer of right foot    Orthostatic hypotension    Other chronic osteomyelitis, right ankle and foot    Personal history of nicotine dependence    Thrombocytopenia, unspecified    Unspecified open wound, right foot, initial encounter    Diabetic foot infection    Subacute osteomyelitis of right foot    Right foot pain    Sepsis    Onychomycosis    Foot pain, left    Impaired mobility and ADLs    Absence of toe of right foot    Corns and callosity    Disability of walking    Fracture    Limb swelling    Polyneuropathy    Pressure ulcer, stage 1    Shortness of breath     Generalized weakness    Debility    Ulcerated, foot, left, limited to breakdown of skin    Onychomycosis     Past Medical History:   Diagnosis Date    Absence of toe of right foot     Acute osteomyelitis of left calcaneus  08/18/2021    Anxiety and depression     Arthritis     Cancer     Chronic pain     STATES HIS PAIN IS 10/10 AAT    Claustrophobia     Corns and callus     Diabetic ulcer of left heel associated with type 2 DM 08/18/2021    Diabetic ulcer of left heel associated with type 2 DM 07/06/2021    Diabetic ulcer of right midfoot associated with type 2 DM 08/18/2021    Difficulty walking     Essential hypertension 08/31/2021    Hammertoe     Hyperlipidemia LDL goal <100 08/31/2021    Ingrown toenail     Obesity     Paroxysmal atrial fibrillation 08/31/2021    Polyneuropathy     Pressure ulcer, stage 1     Tinea unguium     Type 2 diabetes mellitus with polyneuropathy      Past Surgical History:   Procedure Laterality Date    CYST REMOVAL      center of back; benign    EYE SURGERY      INCISION AND DRAINAGE ABSCESS      back    INCISION AND DRAINAGE LEG Right 12/10/2021    Procedure: INCISION AND DRAINAGE LOWER EXTREMITY;  Surgeon: Ash Leyva DPM;  Location: [facility] MAIN OR;  Service: Podiatry;  Laterality: Right;    OTHER SURGICAL HISTORY      Surgical clips left foot    TOE SURGERY Right     Removal of 5th toe    TRANS METATARSAL AMPUTATION Right 12/02/2021    Procedure: AMPUTATION TRANS METATARSAL;  Surgeon: Ash eLyva DPM;  Location: [facility] MAIN OR;  Service: Podiatry;  Laterality: Right;    VASCULAR SURGERY      WRIST SURGERY Left     repair of injury      General Information    No documentation.                  Mobility/ADL's    No documentation.                  Obj/Interventions    No documentation.                  Goals/Plan       Row Name 06/07/24 0555          Transfer Goal 1 (OT)    Activity/Assistive Device (Transfer Goal 1, OT) transfers, all  -GC     [place]  Level/Cues Needed (Transfer Goal 1, OT) minimum assist (75% or more patient effort)  -GC     Progress/Outcome (Transfer Goal 1, OT) goal not met  -GC       Row Name 06/07/24 0555          Bathing Goal 1 (OT)    Activity/Device (Bathing Goal 1, OT) bathing skills, all  -GC     [place] Level/Cues Needed (Bathing Goal 1, OT) supervision required  -GC     Progress/Outcomes (Bathing Goal 1, OT) goal not met  -GC       Row Name 06/07/24 0555          Dressing Goal 1 (OT)    Activity/Device (Dressing Goal 1, OT) dressing skills, all  -GC     [place]/Cues Needed (Dressing Goal 1, OT) minimum assist (75% or more patient effort)  -GC     Progress/Outcome (Dressing Goal 1, OT) goal not met  -GC       Row Name 06/07/24 0555          Toileting Goal 1 (OT)    [place] Level/Cues Needed (Toileting Goal 1, OT) minimum assist (75% or more patient effort)  -GC     Progress/Outcome (Toileting Goal 1, OT) goal not met  -GC       Row Name 06/07/24 0555          Grooming Goal 1 (OT)    Activity/Device (Grooming Goal 1, OT) grooming skills, all  -GC     [place] (Grooming Goal 1, OT) set-up required  -GC     Progress/Outcome (Grooming Goal 1, OT) goal not met  -GC       Row Name 06/07/24 0555          Strength Goal 1 (OT)    Strength Goal 1 (OT) Patient will improve bilateral upper extremity strength to 5/5 to support independence and engagement with ADL activities  -GC     Progress/Outcome (Strength Goal 1, OT) goal not met  -GC       Row Name 06/07/24 0555          Problem Specific Goal 1 (OT)    Problem Specific Goal 1 (OT) Patient will improve activity tolerance to good to support independence with self-care activity and functional transfers  -GC     Progress/Outcome (Problem Specific Goal 1, OT) goal not met  -               User Key  (r) = Recorded By, (t) = Taken By, (c) = Cosigned By      Initials Name Provider Type    GC Rosalva Irvin, OT Occupational Therapist                   Clinical Impression       Row  Name 06/07/24 0600          Plan of Care Review    Outcome Evaluation Patient has not made functional progress toward ADL goals since April due to pain in hips and knees. Pt. unabla to engage functionally in adls for LB reaching maximal potential. Pt. continues to use bedpan. P.T. d/c yesterday with recommendations made.   Pt. planning for possible THR soon..  Plan to d/c with patient continue UE exercises with restorative nursing. No goals met.  -               User Key  (r) = Recorded By, (t) = Taken By, (c) = Cosigned By      Initials Name Provider Type    Rosalva Campbell OT Occupational Therapist                   Outcome Measures       Row Name 06/07/24 0000          How much help from another person do you currently need...    Turning from your back to your side while in flat bed without using bedrails? 3  -KM     Moving from lying on back to sitting on the side of a flat bed without bedrails? 2  -KM     Moving to and from a bed to a chair (including a wheelchair)? 2  -KM     Standing up from a chair using your arms (e.g., wheelchair, bedside chair)? 2  -KM     Climbing 3-5 steps with a railing? 1  -KM     To walk in hospital room? 2  -KM     AM-PAC 6 Clicks Score (PT) 12  -KM     Highest Level of Mobility Goal 4 --> Transfer to chair/commode  -KM               User Key  (r) = Recorded By, (t) = Taken By, (c) = Cosigned By      Initials Name Provider Type    Irene Sigala, RN Registered Nurse                  Section G              Section GG                         Occupational Therapy Education       Title: PT OT SLP Therapies (Done)       Topic: Occupational Therapy (Done)       Point: ADL training (Done)       Description:   Instruct learner(s) on proper safety adaptation and remediation techniques during self care or transfers.   Instruct in proper use of assistive devices.                  Learning Progress Summary             Patient Acceptance, E,TB, VU by  at 5/28/2024 6446    Acceptance,  E,D, DU by PG at 4/24/2024 1039    Acceptance, E,TB, VU by RM at 2/14/2024 0518    Acceptance, E,TB, VU by RM at 1/9/2024 0420    Acceptance, E, VU by CR at 12/30/2023 1750    Acceptance, E,TB, VU by RM at 11/30/2023 0420    Acceptance, E,D, DU by PG at 11/7/2023 0932                         Point: Home exercise program (Done)       Description:   Instruct learner(s) on appropriate technique for monitoring, assisting and/or progressing therapeutic exercises/activities.                  Learning Progress Summary             Patient Acceptance, E,TB, VU by RM at 5/28/2024 0439    Acceptance, E,D, DU by PG at 4/24/2024 1039    Acceptance, E,TB, VU by RM at 2/14/2024 0518    Acceptance, E,TB, VU by RM at 1/9/2024 0420    Acceptance, E, VU by CR at 12/30/2023 1750    Acceptance, E,TB, VU by RM at 11/30/2023 0420    Acceptance, E,D, DU by PG at 11/7/2023 0932                         Point: Precautions (Done)       Description:   Instruct learner(s) on prescribed precautions during self-care and functional transfers.                  Learning Progress Summary             Patient Acceptance, E,TB, VU by RM at 5/28/2024 0439    Acceptance, E,D, DU by PG at 4/24/2024 1039    Acceptance, E,TB, VU by RM at 2/14/2024 0518    Acceptance, E,TB, VU by RM at 1/9/2024 0420    Acceptance, E, VU by CR at 12/30/2023 1750    Acceptance, E,TB, VU by RM at 11/30/2023 0420    Acceptance, E,D, DU by PG at 11/7/2023 0932                         Point: Body mechanics (Done)       Description:   Instruct learner(s) on proper positioning and spine alignment during self-care, functional mobility activities and/or exercises.                  Learning Progress Summary             Patient Acceptance, E,TB, VU by RM at 5/28/2024 0439    Acceptance, E,D, DU by PG at 4/24/2024 1039    Acceptance, E,TB, VU by RM at 2/14/2024 0518    Acceptance, E,TB, VU by RM at 1/9/2024 0420    Acceptance, E, VU by CR at 12/30/2023 1750    Acceptance, E,TB, VU by RM at  11/30/2023 0420    Acceptance, E,D, DU by PG at 11/7/2023 0932                                         User Key       Initials Effective Dates Name Provider Type Discipline     06/08/21 -  Bharath Berg RN Registered Nurse Nurse    CR 06/13/22 -  Adilson Baker LPN Licensed Nurse Nurse    PG 06/16/21 -  Kodak Matos OT Occupational Therapist OT                  OT Recommendation and Plan     Plan of Care Review  Plan of Care Reviewed With: patient  Progress: no change  Outcome Evaluation: Patient has not made functional progress toward ADL goals since April due to pain in hips and knees. Pt. unabla to engage functionally in adls for LB reaching maximal potential. Pt. continues to use bedpan. P.T. d/c yesterday with recommendations made.   Pt. planning for possible THR soon..  Plan to d/c with patient continue UE exercises with restorative nursing. No goals met.  Plan of Care Reviewed With: patient  Outcome Evaluation: Patient has not made functional progress toward ADL goals since April due to pain in hips and knees. Pt. unabla to engage functionally in adls for LB reaching maximal potential. Pt. continues to use bedpan. P.T. d/c yesterday with recommendations made.   Pt. planning for possible THR soon..  Plan to d/c with patient continue UE exercises with restorative nursing. No goals met.     Time Calculation:                   Rosalva Irvin OT  6/7/2024

## 2024-06-07 NOTE — PLAN OF CARE
Goal Outcome Evaluation:  Plan of Care Reviewed With: patient        Progress: no change  Outcome Evaluation: alert and oriented x 4. blood glucose monitored. medicated per orders.

## 2024-06-08 LAB
GLUCOSE BLDC GLUCOMTR-MCNC: 103 MG/DL (ref 70–99)
GLUCOSE BLDC GLUCOMTR-MCNC: 109 MG/DL (ref 70–99)
GLUCOSE BLDC GLUCOMTR-MCNC: 112 MG/DL (ref 70–99)
GLUCOSE BLDC GLUCOMTR-MCNC: 116 MG/DL (ref 70–99)

## 2024-06-08 PROCEDURE — 63710000001 INSULIN GLARGINE PER 5 UNITS: Performed by: INTERNAL MEDICINE

## 2024-06-08 PROCEDURE — 82948 REAGENT STRIP/BLOOD GLUCOSE: CPT

## 2024-06-08 PROCEDURE — 82948 REAGENT STRIP/BLOOD GLUCOSE: CPT | Performed by: INTERNAL MEDICINE

## 2024-06-08 PROCEDURE — 99309 SBSQ NF CARE MODERATE MDM 30: CPT | Performed by: INTERNAL MEDICINE

## 2024-06-08 PROCEDURE — 63710000001 INSULIN GLARGINE PER 5 UNITS: Performed by: PHYSICIAN ASSISTANT

## 2024-06-08 RX ADMIN — OXYCODONE AND ACETAMINOPHEN 1 TABLET: 10; 325 TABLET ORAL at 10:49

## 2024-06-08 RX ADMIN — SERTRALINE HYDROCHLORIDE 50 MG: 50 TABLET ORAL at 20:41

## 2024-06-08 RX ADMIN — Medication 10 MG: at 20:41

## 2024-06-08 RX ADMIN — OXYCODONE AND ACETAMINOPHEN 1 TABLET: 10; 325 TABLET ORAL at 19:14

## 2024-06-08 RX ADMIN — PREGABALIN 100 MG: 100 CAPSULE ORAL at 09:28

## 2024-06-08 RX ADMIN — Medication 500 MG: at 20:41

## 2024-06-08 RX ADMIN — IBUPROFEN 400 MG: 400 TABLET ORAL at 08:08

## 2024-06-08 RX ADMIN — BACLOFEN 10 MG: 10 TABLET ORAL at 19:14

## 2024-06-08 RX ADMIN — DICLOFENAC SODIUM 4 G: 10 GEL TOPICAL at 19:14

## 2024-06-08 RX ADMIN — DICLOFENAC SODIUM 4 G: 10 GEL TOPICAL at 08:09

## 2024-06-08 RX ADMIN — INSULIN GLARGINE 22 UNITS: 100 INJECTION, SOLUTION SUBCUTANEOUS at 20:40

## 2024-06-08 RX ADMIN — CYANOCOBALAMIN TAB 500 MCG 1000 MCG: 500 TAB at 09:28

## 2024-06-08 RX ADMIN — Medication 1 APPLICATION: at 22:06

## 2024-06-08 RX ADMIN — OXYCODONE AND ACETAMINOPHEN 1 TABLET: 10; 325 TABLET ORAL at 01:53

## 2024-06-08 RX ADMIN — Medication 500 MG: at 09:28

## 2024-06-08 RX ADMIN — PREGABALIN 100 MG: 100 CAPSULE ORAL at 15:02

## 2024-06-08 RX ADMIN — FERROUS SULFATE TAB 325 MG (65 MG ELEMENTAL FE) 325 MG: 325 (65 FE) TAB at 09:28

## 2024-06-08 RX ADMIN — DICLOFENAC SODIUM 4 G: 10 GEL TOPICAL at 14:59

## 2024-06-08 RX ADMIN — FUROSEMIDE 40 MG: 40 TABLET ORAL at 09:28

## 2024-06-08 RX ADMIN — ACETAMINOPHEN 650 MG: 325 TABLET ORAL at 00:02

## 2024-06-08 RX ADMIN — APIXABAN 5 MG: 5 TABLET, FILM COATED ORAL at 09:28

## 2024-06-08 RX ADMIN — BACLOFEN 10 MG: 10 TABLET ORAL at 10:49

## 2024-06-08 RX ADMIN — INSULIN GLARGINE 35 UNITS: 100 INJECTION, SOLUTION SUBCUTANEOUS at 09:30

## 2024-06-08 RX ADMIN — APIXABAN 5 MG: 5 TABLET, FILM COATED ORAL at 20:41

## 2024-06-08 RX ADMIN — EMPAGLIFLOZIN 10 MG: 10 TABLET, FILM COATED ORAL at 09:28

## 2024-06-08 RX ADMIN — Medication 1 APPLICATION: at 09:28

## 2024-06-08 RX ADMIN — BACLOFEN 10 MG: 10 TABLET ORAL at 01:53

## 2024-06-08 RX ADMIN — ATORVASTATIN CALCIUM 10 MG: 10 TABLET, FILM COATED ORAL at 20:41

## 2024-06-08 RX ADMIN — CETIRIZINE HYDROCHLORIDE 10 MG: 10 TABLET, FILM COATED ORAL at 09:28

## 2024-06-08 RX ADMIN — DICLOFENAC SODIUM 4 G: 10 GEL TOPICAL at 01:54

## 2024-06-08 RX ADMIN — LISINOPRIL 2.5 MG: 2.5 TABLET ORAL at 09:28

## 2024-06-08 RX ADMIN — MONTELUKAST 10 MG: 10 TABLET, FILM COATED ORAL at 20:41

## 2024-06-08 RX ADMIN — PREGABALIN 100 MG: 100 CAPSULE ORAL at 20:41

## 2024-06-08 NOTE — PLAN OF CARE
Goal Outcome Evaluation:  Plan of Care Reviewed With: patient        Progress: no change  Outcome Evaluation: No significant change. Continues to refuse turning and repositioning every two hours with staff. Does use trapeze for self repositioning. Vital signs stable with continued low blood pressure; 94/50. Tylenol, Percocet, and Baclofen administered for pain to left hip and shoulder. Continue plan of care.

## 2024-06-08 NOTE — PLAN OF CARE
Goal Outcome Evaluation:  Plan of Care Reviewed With: patient        Progress: no change  Outcome Evaluation: Patient is alert and oriented. Given PRN Ibuprofen at 0808 for left shoulder pain. Med effective. Given PRN Baclofen and Percocet at 1049 for chronic left hip pain. Med effective. Is non-compliant with low sodium diet. Provider educated patient and patient given written materials on diet. Voices needs. Con't current POC.

## 2024-06-08 NOTE — PROGRESS NOTES
[facility]   Hospitalist Progress Note  Date: 2024  Patient Name: Jose Shaikh  : 1960  MRN: [id number]  Date of admission: 2023      Subjective   Subjective     Chief Complaint: Weakness    Summary: Jose Shaikh is a 63 y.o. male  initially hospitalized on 9/10/2023, prolonged hospitalization for treatment of management of generalized weakness, deconditioning, with acute issues of diarrhea, C. difficile negative.  Diarrhea improved.  Had small hematoma after ambulating on his foot.  Unfortunately with exhaustive efforts to try to place this gentleman after 39 days of hospitalization, we are unable to find a facility that will accept him.  He has no further acute needs or requirements for inpatient monitoring and management, his labs and vitals are stable, he is tolerating oral intake, and has been refusing turns and repositionings and other interventions with nursing staff despite recommendations.  Patient discharged in hemodynamically stable condition on 10/19/2023 to follow-up with PCP within 1 week. Unfortunately we cannot solve all of his social issues during this hospitalization due to lack of resources and participation from the patient's perspective despite all our efforts.  Patient has a financial means of making arrangements at home.  Patient appealed his discharge.  Lost his appeal.  LCD: 10/20/23, PFL beginning: 10/21/23 @ 12pm.  Due to an inability to place the patient in a.m. nursing home he has been placed in our skilled nursing facility until arrangements can be made or until he is able to care for himself.      Interval Followup: 2024  Continues to have hip pain    Review of systems: All systems reviewed and negative except what is noted above in interval follow-up   Objective   Objective     Vitals:   Temp:  [97.3 °F (36.3 °C)-97.4 °F (36.3 °C)] 97.3 °F (36.3 °C)  Heart Rate:  [70-71] 71  Resp:  [16-18] 18  BP: ()/(50-55) 113/55    Physical Exam   Constitutional:  No distress.  Pleasant and conversational.  Respiratory: No wheeze noted.  GI: Abdomen: Obese  Neuro/psych:  Calm mood.  No dysarthria.  Extremities: Lower extremity edema.    Result Review    Result Review:  I have reviewed the following:  [x]  Laboratory  CBC          5/6/2024    05:05 5/28/2024    04:52 6/6/2024    04:35   CBC   WBC 4.06  4.84  4.16    RBC 4.30  4.18  4.06    Hemoglobin 11.9  11.7  11.3    Hematocrit 37.8  36.0  35.5    MCV 87.9  86.1  87.4    MCH 27.7  28.0  27.8    MCHC 31.5  32.5  31.8    RDW 15.3  15.4  15.1    Platelets 92  98  84      CMP          5/22/2024    15:42 5/28/2024    04:52 6/6/2024    04:35   CMP   Glucose 124  99  149    BUN 16  13  17    Creatinine 0.60  0.58  0.56    EGFR 107.1  108.2  109.4    Sodium 134  136  137    Potassium 4.1  3.8  4.2    Chloride 103  103  105    Calcium 8.0  8.4  8.2    Albumin  3.0  3.0    BUN/Creatinine Ratio 26.7  22.4  30.4    Anion Gap 7.6  7.7  9.5        [x]  Microbiology  []  Radiology  []  EKG/Telemetry   []  Cardiology/Vascular   []  Pathology  []  Old records  []  Other:    Assessment & Plan   Assessment / Plan   Assessment:  Hospital-acquired weakness  Hx of Influenza A  Hx of C. difficile diarrhea treated  Generalized weakness  Deconditioning  DM (Kami Garcia and PCP)  HTN  PAF (on Eliquis; previously seen Dr. Duval)  Morbid obesity with BMI 44  Debility with wheelchair dependence  Hx of severe left hip pain  Severe degenerative joint disease of lower extremities  Hx L calcaneus osteomyelitis  Chronic venous stasis  Hx R transmetatarsal  Chronic bilateral foot wounds with right transmetatarsal amputation, healing by secondary intention (wound care clinic; podiatrist Gordon)  Thrombocytopenia, resolved      Plan:  Tolerating Ozempic  Weight fluctuates some seemily more related to volume  Continue pain medication  I discussed the case with Dr. Cox yesterday. His office will make a referral to [facility] orthopedics to see if he is a  candidate for intervention there. It is felt that he is too high risk to be performed here.  I discussed with the patient and he is agreeable to a referral.  I reiterated that a referral does not ensure that he will get surgery, it is just to get a second opinion. He voiced understanding.  We also discussed the importance of strictly adhering to dietary restrictions and therapy recommendations. High sodium intake increases his fluid retention which is counterproductive to his goal of improving his mobility. I discussed the negative impact of DoorDashing high sodium foods. For example, the bowl of veggie chili he said he Door-Dashed from [employer] two days ago contains 1620mg of sodium, and his daily goal is 2000mg.  If he continues to disregard our dietary recommendations it will worsen his prognosis and impair his chances of ever regaining his mobility.  Patient says he will adhere to diet and do what he needs to do.      DVT prophylaxis:  Medical DVT prophylaxis orders are present.    CODE STATUS:   Code Status (Patient has no pulse and is not breathing): CPR (Attempt to Resuscitate)  Medical Interventions (Patient has pulse or is breathing): Full Support    Electronically signed by Jose Maya MD, 06/08/24, 4:21 PM EDT.

## 2024-06-09 LAB
GLUCOSE BLDC GLUCOMTR-MCNC: 108 MG/DL (ref 70–99)
GLUCOSE BLDC GLUCOMTR-MCNC: 148 MG/DL (ref 70–99)
GLUCOSE BLDC GLUCOMTR-MCNC: 154 MG/DL (ref 70–99)
GLUCOSE BLDC GLUCOMTR-MCNC: 99 MG/DL (ref 70–99)

## 2024-06-09 PROCEDURE — 82948 REAGENT STRIP/BLOOD GLUCOSE: CPT

## 2024-06-09 PROCEDURE — 63710000001 INSULIN GLARGINE PER 5 UNITS: Performed by: PHYSICIAN ASSISTANT

## 2024-06-09 PROCEDURE — 82948 REAGENT STRIP/BLOOD GLUCOSE: CPT | Performed by: INTERNAL MEDICINE

## 2024-06-09 PROCEDURE — 63710000001 INSULIN LISPRO (HUMAN) PER 5 UNITS: Performed by: PHYSICIAN ASSISTANT

## 2024-06-09 PROCEDURE — 63710000001 INSULIN GLARGINE PER 5 UNITS: Performed by: INTERNAL MEDICINE

## 2024-06-09 RX ADMIN — PREGABALIN 100 MG: 100 CAPSULE ORAL at 17:03

## 2024-06-09 RX ADMIN — DICLOFENAC SODIUM 4 G: 10 GEL TOPICAL at 01:17

## 2024-06-09 RX ADMIN — DICLOFENAC SODIUM 4 G: 10 GEL TOPICAL at 13:33

## 2024-06-09 RX ADMIN — INSULIN GLARGINE 35 UNITS: 100 INJECTION, SOLUTION SUBCUTANEOUS at 08:14

## 2024-06-09 RX ADMIN — OXYCODONE AND ACETAMINOPHEN 1 TABLET: 10; 325 TABLET ORAL at 03:18

## 2024-06-09 RX ADMIN — CYANOCOBALAMIN TAB 500 MCG 1000 MCG: 500 TAB at 08:15

## 2024-06-09 RX ADMIN — ACETAMINOPHEN 650 MG: 325 TABLET ORAL at 04:37

## 2024-06-09 RX ADMIN — MONTELUKAST 10 MG: 10 TABLET, FILM COATED ORAL at 21:05

## 2024-06-09 RX ADMIN — DICLOFENAC SODIUM 4 G: 10 GEL TOPICAL at 19:49

## 2024-06-09 RX ADMIN — FUROSEMIDE 40 MG: 40 TABLET ORAL at 08:15

## 2024-06-09 RX ADMIN — DICLOFENAC SODIUM 4 G: 10 GEL TOPICAL at 08:16

## 2024-06-09 RX ADMIN — Medication 10 MG: at 21:04

## 2024-06-09 RX ADMIN — BACLOFEN 10 MG: 10 TABLET ORAL at 19:48

## 2024-06-09 RX ADMIN — BACLOFEN 10 MG: 10 TABLET ORAL at 03:18

## 2024-06-09 RX ADMIN — PREGABALIN 100 MG: 100 CAPSULE ORAL at 21:05

## 2024-06-09 RX ADMIN — LISINOPRIL 2.5 MG: 2.5 TABLET ORAL at 08:16

## 2024-06-09 RX ADMIN — APIXABAN 5 MG: 5 TABLET, FILM COATED ORAL at 21:05

## 2024-06-09 RX ADMIN — OXYCODONE AND ACETAMINOPHEN 1 TABLET: 10; 325 TABLET ORAL at 11:26

## 2024-06-09 RX ADMIN — APIXABAN 5 MG: 5 TABLET, FILM COATED ORAL at 08:15

## 2024-06-09 RX ADMIN — BACLOFEN 10 MG: 10 TABLET ORAL at 11:26

## 2024-06-09 RX ADMIN — SERTRALINE HYDROCHLORIDE 50 MG: 50 TABLET ORAL at 21:05

## 2024-06-09 RX ADMIN — ACETAMINOPHEN 650 MG: 325 TABLET ORAL at 18:00

## 2024-06-09 RX ADMIN — Medication 1 APPLICATION: at 10:19

## 2024-06-09 RX ADMIN — OXYCODONE AND ACETAMINOPHEN 1 TABLET: 10; 325 TABLET ORAL at 19:48

## 2024-06-09 RX ADMIN — EMPAGLIFLOZIN 10 MG: 10 TABLET, FILM COATED ORAL at 08:15

## 2024-06-09 RX ADMIN — IBUPROFEN 400 MG: 400 TABLET ORAL at 08:16

## 2024-06-09 RX ADMIN — Medication 500 MG: at 21:05

## 2024-06-09 RX ADMIN — FERROUS SULFATE TAB 325 MG (65 MG ELEMENTAL FE) 325 MG: 325 (65 FE) TAB at 08:15

## 2024-06-09 RX ADMIN — PREGABALIN 100 MG: 100 CAPSULE ORAL at 08:16

## 2024-06-09 RX ADMIN — Medication 1 APPLICATION: at 21:05

## 2024-06-09 RX ADMIN — ACETAMINOPHEN 650 MG: 325 TABLET ORAL at 10:19

## 2024-06-09 RX ADMIN — ATORVASTATIN CALCIUM 10 MG: 10 TABLET, FILM COATED ORAL at 21:05

## 2024-06-09 RX ADMIN — Medication 500 MG: at 08:15

## 2024-06-09 RX ADMIN — CETIRIZINE HYDROCHLORIDE 10 MG: 10 TABLET, FILM COATED ORAL at 08:16

## 2024-06-09 RX ADMIN — INSULIN LISPRO 3 UNITS: 100 INJECTION, SOLUTION INTRAVENOUS; SUBCUTANEOUS at 21:11

## 2024-06-09 RX ADMIN — INSULIN GLARGINE 22 UNITS: 100 INJECTION, SOLUTION SUBCUTANEOUS at 21:04

## 2024-06-09 NOTE — PLAN OF CARE
Goal Outcome Evaluation:  Plan of Care Reviewed With: patient        Progress: no change  Outcome Evaluation: Patient alert and oriented able to make needs known. Perocet and Baclofen given x1 this shift for left hip pain. Tylenol and Motrin given as well. Volteran gel used as ordered to left shoulder. Wound care done as ordered. Continue current POC.

## 2024-06-09 NOTE — PLAN OF CARE
Goal Outcome Evaluation:  Plan of Care Reviewed With: patient        Progress: no change  Outcome Evaluation: No significant change. Percocet and Baclofen administered X 2 for left hip pain. Tylenol administered X 1. Vital signs stable. Used bed trapeze to reposition self. Continue plan of care.

## 2024-06-10 ENCOUNTER — TELEPHONE (OUTPATIENT)
Dept: ORTHOPEDIC SURGERY | Facility: CLINIC | Age: 64
End: 2024-06-10
Payer: MEDICARE

## 2024-06-10 DIAGNOSIS — M16.12 PRIMARY OSTEOARTHRITIS OF LEFT HIP: Primary | ICD-10-CM

## 2024-06-10 LAB
GLUCOSE BLDC GLUCOMTR-MCNC: 121 MG/DL (ref 70–99)
GLUCOSE BLDC GLUCOMTR-MCNC: 129 MG/DL (ref 70–99)
GLUCOSE BLDC GLUCOMTR-MCNC: 143 MG/DL (ref 70–99)
GLUCOSE BLDC GLUCOMTR-MCNC: 95 MG/DL (ref 70–99)

## 2024-06-10 PROCEDURE — 63710000001 INSULIN GLARGINE PER 5 UNITS: Performed by: PHYSICIAN ASSISTANT

## 2024-06-10 PROCEDURE — 63710000001 INSULIN GLARGINE PER 5 UNITS: Performed by: INTERNAL MEDICINE

## 2024-06-10 PROCEDURE — 82948 REAGENT STRIP/BLOOD GLUCOSE: CPT

## 2024-06-10 PROCEDURE — 82948 REAGENT STRIP/BLOOD GLUCOSE: CPT | Performed by: INTERNAL MEDICINE

## 2024-06-10 RX ADMIN — INSULIN GLARGINE 35 UNITS: 100 INJECTION, SOLUTION SUBCUTANEOUS at 10:36

## 2024-06-10 RX ADMIN — OXYCODONE AND ACETAMINOPHEN 1 TABLET: 10; 325 TABLET ORAL at 22:43

## 2024-06-10 RX ADMIN — ACETAMINOPHEN 650 MG: 325 TABLET ORAL at 20:10

## 2024-06-10 RX ADMIN — DICLOFENAC SODIUM 4 G: 10 GEL TOPICAL at 02:07

## 2024-06-10 RX ADMIN — Medication 1 APPLICATION: at 22:15

## 2024-06-10 RX ADMIN — CETIRIZINE HYDROCHLORIDE 10 MG: 10 TABLET, FILM COATED ORAL at 10:36

## 2024-06-10 RX ADMIN — APIXABAN 5 MG: 5 TABLET, FILM COATED ORAL at 20:10

## 2024-06-10 RX ADMIN — FERROUS SULFATE TAB 325 MG (65 MG ELEMENTAL FE) 325 MG: 325 (65 FE) TAB at 07:56

## 2024-06-10 RX ADMIN — MONTELUKAST 10 MG: 10 TABLET, FILM COATED ORAL at 20:11

## 2024-06-10 RX ADMIN — Medication 1 APPLICATION: at 10:38

## 2024-06-10 RX ADMIN — Medication 500 MG: at 10:36

## 2024-06-10 RX ADMIN — IBUPROFEN 400 MG: 400 TABLET ORAL at 20:11

## 2024-06-10 RX ADMIN — Medication 10 MG: at 20:10

## 2024-06-10 RX ADMIN — ACETAMINOPHEN 650 MG: 325 TABLET ORAL at 02:07

## 2024-06-10 RX ADMIN — ATORVASTATIN CALCIUM 10 MG: 10 TABLET, FILM COATED ORAL at 20:11

## 2024-06-10 RX ADMIN — DICLOFENAC SODIUM 4 G: 10 GEL TOPICAL at 13:43

## 2024-06-10 RX ADMIN — CYANOCOBALAMIN TAB 500 MCG 1000 MCG: 500 TAB at 10:35

## 2024-06-10 RX ADMIN — PREGABALIN 100 MG: 100 CAPSULE ORAL at 20:10

## 2024-06-10 RX ADMIN — BACLOFEN 10 MG: 10 TABLET ORAL at 04:19

## 2024-06-10 RX ADMIN — OXYCODONE AND ACETAMINOPHEN 1 TABLET: 10; 325 TABLET ORAL at 04:19

## 2024-06-10 RX ADMIN — Medication 500 MG: at 20:11

## 2024-06-10 RX ADMIN — ACETAMINOPHEN 650 MG: 325 TABLET ORAL at 07:57

## 2024-06-10 RX ADMIN — SERTRALINE HYDROCHLORIDE 50 MG: 50 TABLET ORAL at 20:10

## 2024-06-10 RX ADMIN — APIXABAN 5 MG: 5 TABLET, FILM COATED ORAL at 10:36

## 2024-06-10 RX ADMIN — INSULIN GLARGINE 22 UNITS: 100 INJECTION, SOLUTION SUBCUTANEOUS at 20:11

## 2024-06-10 RX ADMIN — PREGABALIN 100 MG: 100 CAPSULE ORAL at 17:00

## 2024-06-10 RX ADMIN — PREGABALIN 100 MG: 100 CAPSULE ORAL at 10:36

## 2024-06-10 RX ADMIN — OXYCODONE AND ACETAMINOPHEN 1 TABLET: 10; 325 TABLET ORAL at 12:46

## 2024-06-10 RX ADMIN — DICLOFENAC SODIUM 4 G: 10 GEL TOPICAL at 20:11

## 2024-06-10 RX ADMIN — BACLOFEN 10 MG: 10 TABLET ORAL at 12:46

## 2024-06-10 RX ADMIN — BACLOFEN 10 MG: 10 TABLET ORAL at 22:43

## 2024-06-10 RX ADMIN — FUROSEMIDE 40 MG: 40 TABLET ORAL at 10:36

## 2024-06-10 RX ADMIN — IBUPROFEN 400 MG: 400 TABLET ORAL at 07:56

## 2024-06-10 RX ADMIN — DICLOFENAC SODIUM 4 G: 10 GEL TOPICAL at 07:35

## 2024-06-10 RX ADMIN — LISINOPRIL 2.5 MG: 2.5 TABLET ORAL at 10:36

## 2024-06-10 RX ADMIN — EMPAGLIFLOZIN 10 MG: 10 TABLET, FILM COATED ORAL at 10:36

## 2024-06-10 NOTE — TELEPHONE ENCOUNTER
PER DR MON, A REFERRAL WAS PLACED FOR PATIENT TO SEE DR AVILA IN [place] FOR LEFT HIP REPLACEMENT.  I CALLED ABOUT REFERRAL TO THE [facility] LOCATION.  (PHONE [phone]) LEFT A MESSAGE TO CALL ME IN THE OFFICE.  FAXED TO  [phone].

## 2024-06-10 NOTE — PLAN OF CARE
Goal Outcome Evaluation:  Plan of Care Reviewed With: patient        Progress: no change  Outcome Evaluation: Alert and oriented X 4. Able to communicate needs. Continues with Percocet, Baclofen, Tylenol, and schedule Voltaren gel for management of left shoulder and hip pain. No significant change. Continue plan of care.

## 2024-06-11 LAB
GLUCOSE BLDC GLUCOMTR-MCNC: 121 MG/DL (ref 70–99)
GLUCOSE BLDC GLUCOMTR-MCNC: 141 MG/DL (ref 70–99)
GLUCOSE BLDC GLUCOMTR-MCNC: 145 MG/DL (ref 70–99)
GLUCOSE BLDC GLUCOMTR-MCNC: 160 MG/DL (ref 70–99)

## 2024-06-11 PROCEDURE — 63710000001 INSULIN LISPRO (HUMAN) PER 5 UNITS: Performed by: PHYSICIAN ASSISTANT

## 2024-06-11 PROCEDURE — 82948 REAGENT STRIP/BLOOD GLUCOSE: CPT | Performed by: INTERNAL MEDICINE

## 2024-06-11 PROCEDURE — 63710000001 INSULIN GLARGINE PER 5 UNITS: Performed by: INTERNAL MEDICINE

## 2024-06-11 PROCEDURE — 82948 REAGENT STRIP/BLOOD GLUCOSE: CPT

## 2024-06-11 PROCEDURE — 63710000001 INSULIN GLARGINE PER 5 UNITS: Performed by: PHYSICIAN ASSISTANT

## 2024-06-11 PROCEDURE — 63710000001 ONDANSETRON ODT 4 MG TABLET DISPERSIBLE: Performed by: INTERNAL MEDICINE

## 2024-06-11 RX ADMIN — ONDANSETRON 4 MG: 4 TABLET, ORALLY DISINTEGRATING ORAL at 22:42

## 2024-06-11 RX ADMIN — FERROUS SULFATE TAB 325 MG (65 MG ELEMENTAL FE) 325 MG: 325 (65 FE) TAB at 09:04

## 2024-06-11 RX ADMIN — Medication 500 MG: at 20:54

## 2024-06-11 RX ADMIN — DICLOFENAC SODIUM 4 G: 10 GEL TOPICAL at 09:08

## 2024-06-11 RX ADMIN — FUROSEMIDE 40 MG: 40 TABLET ORAL at 09:05

## 2024-06-11 RX ADMIN — PREGABALIN 100 MG: 100 CAPSULE ORAL at 09:05

## 2024-06-11 RX ADMIN — INSULIN GLARGINE 35 UNITS: 100 INJECTION, SOLUTION SUBCUTANEOUS at 09:03

## 2024-06-11 RX ADMIN — CETIRIZINE HYDROCHLORIDE 10 MG: 10 TABLET, FILM COATED ORAL at 09:04

## 2024-06-11 RX ADMIN — Medication 1 APPLICATION: at 09:08

## 2024-06-11 RX ADMIN — INSULIN GLARGINE 22 UNITS: 100 INJECTION, SOLUTION SUBCUTANEOUS at 20:54

## 2024-06-11 RX ADMIN — BACLOFEN 10 MG: 10 TABLET ORAL at 16:12

## 2024-06-11 RX ADMIN — EMPAGLIFLOZIN 10 MG: 10 TABLET, FILM COATED ORAL at 09:04

## 2024-06-11 RX ADMIN — APIXABAN 5 MG: 5 TABLET, FILM COATED ORAL at 20:54

## 2024-06-11 RX ADMIN — PREGABALIN 100 MG: 100 CAPSULE ORAL at 20:54

## 2024-06-11 RX ADMIN — INSULIN LISPRO 3 UNITS: 100 INJECTION, SOLUTION INTRAVENOUS; SUBCUTANEOUS at 20:54

## 2024-06-11 RX ADMIN — DICLOFENAC SODIUM 4 G: 10 GEL TOPICAL at 02:58

## 2024-06-11 RX ADMIN — BACLOFEN 10 MG: 10 TABLET ORAL at 07:26

## 2024-06-11 RX ADMIN — LISINOPRIL 2.5 MG: 2.5 TABLET ORAL at 09:04

## 2024-06-11 RX ADMIN — APIXABAN 5 MG: 5 TABLET, FILM COATED ORAL at 09:04

## 2024-06-11 RX ADMIN — ATORVASTATIN CALCIUM 10 MG: 10 TABLET, FILM COATED ORAL at 20:54

## 2024-06-11 RX ADMIN — DICLOFENAC SODIUM 4 G: 10 GEL TOPICAL at 14:03

## 2024-06-11 RX ADMIN — OXYCODONE AND ACETAMINOPHEN 1 TABLET: 10; 325 TABLET ORAL at 16:12

## 2024-06-11 RX ADMIN — OXYCODONE AND ACETAMINOPHEN 1 TABLET: 10; 325 TABLET ORAL at 07:27

## 2024-06-11 RX ADMIN — Medication 10 MG: at 20:54

## 2024-06-11 RX ADMIN — DICLOFENAC SODIUM 4 G: 10 GEL TOPICAL at 20:56

## 2024-06-11 RX ADMIN — Medication 500 MG: at 09:04

## 2024-06-11 RX ADMIN — IBUPROFEN 400 MG: 400 TABLET ORAL at 19:31

## 2024-06-11 RX ADMIN — SERTRALINE HYDROCHLORIDE 50 MG: 50 TABLET ORAL at 20:54

## 2024-06-11 RX ADMIN — CYANOCOBALAMIN TAB 500 MCG 1000 MCG: 500 TAB at 09:05

## 2024-06-11 RX ADMIN — MONTELUKAST 10 MG: 10 TABLET, FILM COATED ORAL at 20:54

## 2024-06-11 NOTE — PLAN OF CARE
Goal Outcome Evaluation:           Progress: no change  Outcome Evaluation: Rsd. is alert and oriented, able to make needs known to staff.  Rsd. has rested well this shift in between care.  Medicated x1 with prn Tylenol, motrin, baclofen and oxycodone, see emar.  Rsd.  States medications effective.  Rsd. also recieving scheduled voltaren gel to L) shoulder and L) elbow.  Rsd. tolerating well, states effective.  Call light and personal items within reach, Rsd. reminded to use call light for assistance, verbalizes understanding.  Rsd. refuses turns.  Weight shifts with trapeze bar.  Skin care completed to Stanley. lower legs this shift.  Aveeno lotion applied.  Rsd. refuses to transfer or get out of bed this shift.  X1-2 assist to turn and place on bedpan.  Activity encouraged.  WIll continue to monitor and notify on-coming staff.  Current plan of care remains in place this shift.

## 2024-06-11 NOTE — PLAN OF CARE
Goal Outcome Evaluation:              Outcome Evaluation: Patient remained in bed this shift. He had small bm. c/o of left hip pain, prn pain medication and muscle relaxer given. Blood sugars wnl, no sliding scale insulin required.

## 2024-06-11 NOTE — PLAN OF CARE
Goal Outcome Evaluation:  Plan of Care Reviewed With: patient        Progress: no change  Outcome Evaluation: Given PRN ibuprofen and Tylenol at 0756 for break-through left hip and left shoulder pain. meds effective. given PRN Baclofen and Percocet at 1614 for c/o chronic, sharp left hip pain and spasms. Med effective. Voices needs. Con't current POC

## 2024-06-12 ENCOUNTER — TELEPHONE (OUTPATIENT)
Dept: ORTHOPEDIC SURGERY | Facility: CLINIC | Age: 64
End: 2024-06-12
Payer: MEDICARE

## 2024-06-12 LAB
GLUCOSE BLDC GLUCOMTR-MCNC: 120 MG/DL (ref 70–99)
GLUCOSE BLDC GLUCOMTR-MCNC: 132 MG/DL (ref 70–99)
GLUCOSE BLDC GLUCOMTR-MCNC: 145 MG/DL (ref 70–99)
GLUCOSE BLDC GLUCOMTR-MCNC: 81 MG/DL (ref 70–99)

## 2024-06-12 PROCEDURE — 63710000001 INSULIN GLARGINE PER 5 UNITS: Performed by: INTERNAL MEDICINE

## 2024-06-12 PROCEDURE — 82948 REAGENT STRIP/BLOOD GLUCOSE: CPT | Performed by: INTERNAL MEDICINE

## 2024-06-12 PROCEDURE — 63710000001 INSULIN GLARGINE PER 5 UNITS: Performed by: PHYSICIAN ASSISTANT

## 2024-06-12 PROCEDURE — 82948 REAGENT STRIP/BLOOD GLUCOSE: CPT

## 2024-06-12 RX ADMIN — MONTELUKAST 10 MG: 10 TABLET, FILM COATED ORAL at 20:57

## 2024-06-12 RX ADMIN — INSULIN GLARGINE 22 UNITS: 100 INJECTION, SOLUTION SUBCUTANEOUS at 20:57

## 2024-06-12 RX ADMIN — CYANOCOBALAMIN TAB 500 MCG 1000 MCG: 500 TAB at 08:56

## 2024-06-12 RX ADMIN — SERTRALINE HYDROCHLORIDE 50 MG: 50 TABLET ORAL at 20:57

## 2024-06-12 RX ADMIN — ACETAMINOPHEN 650 MG: 325 TABLET ORAL at 20:57

## 2024-06-12 RX ADMIN — FUROSEMIDE 40 MG: 40 TABLET ORAL at 08:57

## 2024-06-12 RX ADMIN — DICLOFENAC SODIUM 4 G: 10 GEL TOPICAL at 03:00

## 2024-06-12 RX ADMIN — OXYCODONE AND ACETAMINOPHEN 1 TABLET: 10; 325 TABLET ORAL at 00:16

## 2024-06-12 RX ADMIN — PREGABALIN 100 MG: 100 CAPSULE ORAL at 17:00

## 2024-06-12 RX ADMIN — ACETAMINOPHEN 650 MG: 325 TABLET ORAL at 05:55

## 2024-06-12 RX ADMIN — DICLOFENAC SODIUM 4 G: 10 GEL TOPICAL at 13:54

## 2024-06-12 RX ADMIN — PREGABALIN 100 MG: 100 CAPSULE ORAL at 20:57

## 2024-06-12 RX ADMIN — Medication 500 MG: at 20:57

## 2024-06-12 RX ADMIN — IBUPROFEN 400 MG: 400 TABLET ORAL at 07:34

## 2024-06-12 RX ADMIN — CETIRIZINE HYDROCHLORIDE 10 MG: 10 TABLET, FILM COATED ORAL at 08:57

## 2024-06-12 RX ADMIN — BACLOFEN 10 MG: 10 TABLET ORAL at 17:14

## 2024-06-12 RX ADMIN — DICLOFENAC SODIUM 4 G: 10 GEL TOPICAL at 07:35

## 2024-06-12 RX ADMIN — ACETAMINOPHEN 650 MG: 325 TABLET ORAL at 11:42

## 2024-06-12 RX ADMIN — PREGABALIN 100 MG: 100 CAPSULE ORAL at 08:57

## 2024-06-12 RX ADMIN — Medication 10 MG: at 20:57

## 2024-06-12 RX ADMIN — APIXABAN 5 MG: 5 TABLET, FILM COATED ORAL at 08:57

## 2024-06-12 RX ADMIN — BACLOFEN 10 MG: 10 TABLET ORAL at 00:16

## 2024-06-12 RX ADMIN — DICLOFENAC SODIUM 4 G: 10 GEL TOPICAL at 21:00

## 2024-06-12 RX ADMIN — ATORVASTATIN CALCIUM 10 MG: 10 TABLET, FILM COATED ORAL at 20:57

## 2024-06-12 RX ADMIN — Medication 500 MG: at 08:56

## 2024-06-12 RX ADMIN — LISINOPRIL 2.5 MG: 2.5 TABLET ORAL at 08:56

## 2024-06-12 RX ADMIN — OXYCODONE AND ACETAMINOPHEN 1 TABLET: 10; 325 TABLET ORAL at 17:13

## 2024-06-12 RX ADMIN — BACLOFEN 10 MG: 10 TABLET ORAL at 08:57

## 2024-06-12 RX ADMIN — FERROUS SULFATE TAB 325 MG (65 MG ELEMENTAL FE) 325 MG: 325 (65 FE) TAB at 07:34

## 2024-06-12 RX ADMIN — INSULIN GLARGINE 35 UNITS: 100 INJECTION, SOLUTION SUBCUTANEOUS at 08:56

## 2024-06-12 RX ADMIN — APIXABAN 5 MG: 5 TABLET, FILM COATED ORAL at 20:57

## 2024-06-12 RX ADMIN — Medication 1 APPLICATION: at 11:44

## 2024-06-12 RX ADMIN — EMPAGLIFLOZIN 10 MG: 10 TABLET, FILM COATED ORAL at 08:57

## 2024-06-12 RX ADMIN — OXYCODONE AND ACETAMINOPHEN 1 TABLET: 10; 325 TABLET ORAL at 08:57

## 2024-06-12 NOTE — TELEPHONE ENCOUNTER
RECEIVED A PHONE CALL FROM MICHELLE AT [facility] FROM [facility].  PATIENT IS SCHEDULED WITH DR AVILA ON 7-25-24 AT 2PM AT THE [facility] LOCATION.

## 2024-06-12 NOTE — PLAN OF CARE
Goal Outcome Evaluation:  Plan of Care Reviewed With: patient        Progress: no change  Outcome Evaluation: Patient is alert and oriented x4. Con't to have chronic, severe pain to left hip and shoulder. Given PRN ibuprofen at 0734, Baclofen and Percocet at 0857 and again at 1714, and Tylenol at 1142. Stated meds help but pain won't go completely away. Refused turns and refused to get weighed today. Blood glucose has been good throughout shift. No sliding scale insulin required. Was given his scheduled weekly Ozempic injection for weight loss this afternoon. Voices needs. Con't current POC

## 2024-06-12 NOTE — PLAN OF CARE
Goal Outcome Evaluation:           Progress: no change  Outcome Evaluation: Rsd. is alert and oriented, able to make needs known to staff.  Rsd. has rested well this shift in between care.  Medicated x1 with prn Tylenol, motrin, baclofen and oxycodone, see emar.  Rsd.  States medications effective.  Rsd. also recieving scheduled voltaren gel to L) shoulder and L) elbow.  Rsd. tolerating well, states effective.  Call light and personal items within reach, Rsd. reminded to use call light for assistance, verbalizes understanding.  Rsd. refuses turns.  Weight shifts with trapeze bar.  Skin care completed to Stanley. lower legs this shift.  Aveeno lotion applied.  Rsd. refuses to transfer or get out of bed this shift.  X1-2 assist to turn and place on bedpan.  Activity encouraged.  WIll continue to monitor and notify on-coming staff.  Current plan of care remains in place this shift.  Rsd. Medicated earlier in shift with prn Zofran ODT for nausea, states medication effective.

## 2024-06-13 LAB
GLUCOSE BLDC GLUCOMTR-MCNC: 107 MG/DL (ref 70–99)
GLUCOSE BLDC GLUCOMTR-MCNC: 107 MG/DL (ref 70–99)
GLUCOSE BLDC GLUCOMTR-MCNC: 113 MG/DL (ref 70–99)
GLUCOSE BLDC GLUCOMTR-MCNC: 131 MG/DL (ref 70–99)

## 2024-06-13 PROCEDURE — 82948 REAGENT STRIP/BLOOD GLUCOSE: CPT | Performed by: INTERNAL MEDICINE

## 2024-06-13 PROCEDURE — 63710000001 INSULIN GLARGINE PER 5 UNITS: Performed by: PHYSICIAN ASSISTANT

## 2024-06-13 PROCEDURE — 63710000001 INSULIN GLARGINE PER 5 UNITS: Performed by: INTERNAL MEDICINE

## 2024-06-13 RX ADMIN — IBUPROFEN 400 MG: 400 TABLET ORAL at 22:18

## 2024-06-13 RX ADMIN — BACLOFEN 10 MG: 10 TABLET ORAL at 01:38

## 2024-06-13 RX ADMIN — ACETAMINOPHEN 650 MG: 325 TABLET ORAL at 04:00

## 2024-06-13 RX ADMIN — INSULIN GLARGINE 35 UNITS: 100 INJECTION, SOLUTION SUBCUTANEOUS at 09:39

## 2024-06-13 RX ADMIN — BACLOFEN 10 MG: 10 TABLET ORAL at 09:40

## 2024-06-13 RX ADMIN — OXYCODONE AND ACETAMINOPHEN 1 TABLET: 10; 325 TABLET ORAL at 09:40

## 2024-06-13 RX ADMIN — APIXABAN 5 MG: 5 TABLET, FILM COATED ORAL at 09:41

## 2024-06-13 RX ADMIN — DICLOFENAC SODIUM 4 G: 10 GEL TOPICAL at 09:44

## 2024-06-13 RX ADMIN — SERTRALINE HYDROCHLORIDE 50 MG: 50 TABLET ORAL at 22:18

## 2024-06-13 RX ADMIN — OXYCODONE AND ACETAMINOPHEN 1 TABLET: 10; 325 TABLET ORAL at 01:38

## 2024-06-13 RX ADMIN — BACLOFEN 10 MG: 10 TABLET ORAL at 17:51

## 2024-06-13 RX ADMIN — DICLOFENAC SODIUM 4 G: 10 GEL TOPICAL at 16:00

## 2024-06-13 RX ADMIN — OXYCODONE AND ACETAMINOPHEN 1 TABLET: 10; 325 TABLET ORAL at 17:51

## 2024-06-13 RX ADMIN — Medication 500 MG: at 09:40

## 2024-06-13 RX ADMIN — ATORVASTATIN CALCIUM 10 MG: 10 TABLET, FILM COATED ORAL at 22:19

## 2024-06-13 RX ADMIN — MONTELUKAST 10 MG: 10 TABLET, FILM COATED ORAL at 22:18

## 2024-06-13 RX ADMIN — PREGABALIN 100 MG: 100 CAPSULE ORAL at 09:41

## 2024-06-13 RX ADMIN — Medication 10 MG: at 22:18

## 2024-06-13 RX ADMIN — INSULIN GLARGINE 22 UNITS: 100 INJECTION, SOLUTION SUBCUTANEOUS at 22:17

## 2024-06-13 RX ADMIN — PREGABALIN 100 MG: 100 CAPSULE ORAL at 22:19

## 2024-06-13 RX ADMIN — Medication 1 APPLICATION: at 22:20

## 2024-06-13 RX ADMIN — EMPAGLIFLOZIN 10 MG: 10 TABLET, FILM COATED ORAL at 09:39

## 2024-06-13 RX ADMIN — DICLOFENAC SODIUM 4 G: 10 GEL TOPICAL at 01:40

## 2024-06-13 RX ADMIN — ACETAMINOPHEN 650 MG: 325 TABLET ORAL at 15:58

## 2024-06-13 RX ADMIN — CYANOCOBALAMIN TAB 500 MCG 1000 MCG: 500 TAB at 09:39

## 2024-06-13 RX ADMIN — Medication 500 MG: at 22:18

## 2024-06-13 RX ADMIN — FERROUS SULFATE TAB 325 MG (65 MG ELEMENTAL FE) 325 MG: 325 (65 FE) TAB at 09:41

## 2024-06-13 RX ADMIN — APIXABAN 5 MG: 5 TABLET, FILM COATED ORAL at 22:19

## 2024-06-13 RX ADMIN — DICLOFENAC SODIUM 4 G: 10 GEL TOPICAL at 22:20

## 2024-06-13 RX ADMIN — PREGABALIN 100 MG: 100 CAPSULE ORAL at 15:58

## 2024-06-13 RX ADMIN — CETIRIZINE HYDROCHLORIDE 10 MG: 10 TABLET, FILM COATED ORAL at 09:41

## 2024-06-13 NOTE — PLAN OF CARE
Goal Outcome Evaluation:           Progress: no change  Outcome Evaluation: Rsd. is alert and oriented, able to make needs known to staff.  Rsd. has rested well this shift in between care.  Medicated x1 with prn Tylenol, baclofen and oxycodone, see emar.  Rsd.  States medications effective.  Rsd. also recieving scheduled voltaren gel to L) shoulder and L) elbow.  Rsd. tolerating well, states effective.  Call light and personal items within reach, Rsd. reminded to use call light for assistance, verbalizes understanding.  Rsd. refuses turns.  Weight shifts with trapeze bar.  Skin care completed to [place]. lower legs this shift.  Aveeno lotion applied.  Rsd. refuses to transfer or get out of bed this shift.  X1-2 assist to turn and place on bedpan.  Activity encouraged.  WIll continue to monitor and notify on-coming staff.  Current plan of care remains in place this shift.

## 2024-06-14 LAB
GLUCOSE BLDC GLUCOMTR-MCNC: 106 MG/DL (ref 70–99)
GLUCOSE BLDC GLUCOMTR-MCNC: 115 MG/DL (ref 70–99)
GLUCOSE BLDC GLUCOMTR-MCNC: 117 MG/DL (ref 70–99)
GLUCOSE BLDC GLUCOMTR-MCNC: 137 MG/DL (ref 70–99)

## 2024-06-14 PROCEDURE — 82948 REAGENT STRIP/BLOOD GLUCOSE: CPT

## 2024-06-14 PROCEDURE — 82948 REAGENT STRIP/BLOOD GLUCOSE: CPT | Performed by: INTERNAL MEDICINE

## 2024-06-14 PROCEDURE — 63710000001 INSULIN GLARGINE PER 5 UNITS: Performed by: PHYSICIAN ASSISTANT

## 2024-06-14 PROCEDURE — 63710000001 INSULIN GLARGINE PER 5 UNITS: Performed by: INTERNAL MEDICINE

## 2024-06-14 RX ADMIN — ATORVASTATIN CALCIUM 10 MG: 10 TABLET, FILM COATED ORAL at 20:17

## 2024-06-14 RX ADMIN — Medication 500 MG: at 20:19

## 2024-06-14 RX ADMIN — PREGABALIN 100 MG: 100 CAPSULE ORAL at 09:42

## 2024-06-14 RX ADMIN — OXYCODONE AND ACETAMINOPHEN 1 TABLET: 10; 325 TABLET ORAL at 20:18

## 2024-06-14 RX ADMIN — BACLOFEN 10 MG: 10 TABLET ORAL at 10:53

## 2024-06-14 RX ADMIN — BACLOFEN 10 MG: 10 TABLET ORAL at 20:18

## 2024-06-14 RX ADMIN — CETIRIZINE HYDROCHLORIDE 10 MG: 10 TABLET, FILM COATED ORAL at 09:44

## 2024-06-14 RX ADMIN — LISINOPRIL 2.5 MG: 2.5 TABLET ORAL at 09:43

## 2024-06-14 RX ADMIN — INSULIN GLARGINE 35 UNITS: 100 INJECTION, SOLUTION SUBCUTANEOUS at 09:43

## 2024-06-14 RX ADMIN — MONTELUKAST 10 MG: 10 TABLET, FILM COATED ORAL at 20:19

## 2024-06-14 RX ADMIN — Medication 500 MG: at 09:42

## 2024-06-14 RX ADMIN — PREGABALIN 100 MG: 100 CAPSULE ORAL at 16:44

## 2024-06-14 RX ADMIN — Medication 10 MG: at 20:18

## 2024-06-14 RX ADMIN — BACLOFEN 10 MG: 10 TABLET ORAL at 02:15

## 2024-06-14 RX ADMIN — EMPAGLIFLOZIN 10 MG: 10 TABLET, FILM COATED ORAL at 09:42

## 2024-06-14 RX ADMIN — SERTRALINE HYDROCHLORIDE 50 MG: 50 TABLET ORAL at 20:19

## 2024-06-14 RX ADMIN — ACETAMINOPHEN 650 MG: 325 TABLET ORAL at 06:42

## 2024-06-14 RX ADMIN — FUROSEMIDE 40 MG: 40 TABLET ORAL at 09:43

## 2024-06-14 RX ADMIN — APIXABAN 5 MG: 5 TABLET, FILM COATED ORAL at 09:43

## 2024-06-14 RX ADMIN — IBUPROFEN 400 MG: 400 TABLET ORAL at 13:50

## 2024-06-14 RX ADMIN — INSULIN GLARGINE 22 UNITS: 100 INJECTION, SOLUTION SUBCUTANEOUS at 20:17

## 2024-06-14 RX ADMIN — CYANOCOBALAMIN TAB 500 MCG 1000 MCG: 500 TAB at 09:42

## 2024-06-14 RX ADMIN — Medication 1 APPLICATION: at 09:45

## 2024-06-14 RX ADMIN — DICLOFENAC SODIUM 4 G: 10 GEL TOPICAL at 14:21

## 2024-06-14 RX ADMIN — FERROUS SULFATE TAB 325 MG (65 MG ELEMENTAL FE) 325 MG: 325 (65 FE) TAB at 09:43

## 2024-06-14 RX ADMIN — PREGABALIN 100 MG: 100 CAPSULE ORAL at 20:17

## 2024-06-14 RX ADMIN — DICLOFENAC SODIUM 4 G: 10 GEL TOPICAL at 20:20

## 2024-06-14 RX ADMIN — DICLOFENAC SODIUM 4 G: 10 GEL TOPICAL at 09:00

## 2024-06-14 RX ADMIN — OXYCODONE AND ACETAMINOPHEN 1 TABLET: 10; 325 TABLET ORAL at 02:15

## 2024-06-14 RX ADMIN — DICLOFENAC SODIUM 4 G: 10 GEL TOPICAL at 02:16

## 2024-06-14 RX ADMIN — OXYCODONE AND ACETAMINOPHEN 1 TABLET: 10; 325 TABLET ORAL at 10:53

## 2024-06-14 RX ADMIN — ACETAMINOPHEN 650 MG: 325 TABLET ORAL at 17:20

## 2024-06-14 RX ADMIN — APIXABAN 5 MG: 5 TABLET, FILM COATED ORAL at 20:19

## 2024-06-14 NOTE — PLAN OF CARE
Goal Outcome Evaluation:  Plan of Care Reviewed With: patient        Progress: no change  Outcome Evaluation: Alert and oriented x4. Con't to have chronic pain to left hip and left shoulder. Given PRN Baclofen and Percocet at 1053; PRN ibuprofen at 1350; and PRN Tylenol at 1720 for pain. Stated helps some but pain with movement is severe. Refused turns multiple times r/t pain. Voices needs. Con't current POC.

## 2024-06-14 NOTE — PLAN OF CARE
Goal Outcome Evaluation:  Plan of Care Reviewed With: patient           Outcome Evaluation: RSD AAOX4, makes needs known to staff with out difficulty. Continues to require around the clock oxycodone and  baclofen for control of pain and spasms.no new changes call light and personal items within reach at bedside.

## 2024-06-15 LAB
GLUCOSE BLDC GLUCOMTR-MCNC: 124 MG/DL (ref 70–99)
GLUCOSE BLDC GLUCOMTR-MCNC: 143 MG/DL (ref 70–99)
GLUCOSE BLDC GLUCOMTR-MCNC: 96 MG/DL (ref 70–99)
GLUCOSE BLDC GLUCOMTR-MCNC: 97 MG/DL (ref 70–99)

## 2024-06-15 PROCEDURE — 63710000001 INSULIN GLARGINE PER 5 UNITS: Performed by: PHYSICIAN ASSISTANT

## 2024-06-15 PROCEDURE — 63710000001 INSULIN GLARGINE PER 5 UNITS: Performed by: INTERNAL MEDICINE

## 2024-06-15 PROCEDURE — 82948 REAGENT STRIP/BLOOD GLUCOSE: CPT

## 2024-06-15 RX ADMIN — FUROSEMIDE 40 MG: 40 TABLET ORAL at 08:18

## 2024-06-15 RX ADMIN — PREGABALIN 100 MG: 100 CAPSULE ORAL at 20:06

## 2024-06-15 RX ADMIN — INSULIN GLARGINE 22 UNITS: 100 INJECTION, SOLUTION SUBCUTANEOUS at 20:04

## 2024-06-15 RX ADMIN — BACLOFEN 10 MG: 10 TABLET ORAL at 04:21

## 2024-06-15 RX ADMIN — SERTRALINE HYDROCHLORIDE 50 MG: 50 TABLET ORAL at 20:05

## 2024-06-15 RX ADMIN — ATORVASTATIN CALCIUM 10 MG: 10 TABLET, FILM COATED ORAL at 20:06

## 2024-06-15 RX ADMIN — OXYCODONE AND ACETAMINOPHEN 1 TABLET: 10; 325 TABLET ORAL at 04:21

## 2024-06-15 RX ADMIN — CYANOCOBALAMIN TAB 500 MCG 1000 MCG: 500 TAB at 08:18

## 2024-06-15 RX ADMIN — BACLOFEN 10 MG: 10 TABLET ORAL at 22:32

## 2024-06-15 RX ADMIN — Medication 500 MG: at 20:05

## 2024-06-15 RX ADMIN — EMPAGLIFLOZIN 10 MG: 10 TABLET, FILM COATED ORAL at 08:18

## 2024-06-15 RX ADMIN — APIXABAN 5 MG: 5 TABLET, FILM COATED ORAL at 20:05

## 2024-06-15 RX ADMIN — Medication 10 MG: at 20:06

## 2024-06-15 RX ADMIN — DICLOFENAC SODIUM 4 G: 10 GEL TOPICAL at 13:30

## 2024-06-15 RX ADMIN — ACETAMINOPHEN 650 MG: 325 TABLET ORAL at 10:22

## 2024-06-15 RX ADMIN — CETIRIZINE HYDROCHLORIDE 10 MG: 10 TABLET, FILM COATED ORAL at 08:18

## 2024-06-15 RX ADMIN — PREGABALIN 100 MG: 100 CAPSULE ORAL at 08:18

## 2024-06-15 RX ADMIN — DICLOFENAC SODIUM 4 G: 10 GEL TOPICAL at 19:57

## 2024-06-15 RX ADMIN — ACETAMINOPHEN 650 MG: 325 TABLET ORAL at 01:02

## 2024-06-15 RX ADMIN — Medication 500 MG: at 08:18

## 2024-06-15 RX ADMIN — DICLOFENAC SODIUM 4 G: 10 GEL TOPICAL at 08:21

## 2024-06-15 RX ADMIN — INSULIN GLARGINE 35 UNITS: 100 INJECTION, SOLUTION SUBCUTANEOUS at 08:18

## 2024-06-15 RX ADMIN — DICLOFENAC SODIUM 4 G: 10 GEL TOPICAL at 01:02

## 2024-06-15 RX ADMIN — OXYCODONE AND ACETAMINOPHEN 1 TABLET: 10; 325 TABLET ORAL at 22:32

## 2024-06-15 RX ADMIN — LISINOPRIL 2.5 MG: 2.5 TABLET ORAL at 08:18

## 2024-06-15 RX ADMIN — APIXABAN 5 MG: 5 TABLET, FILM COATED ORAL at 08:18

## 2024-06-15 RX ADMIN — OXYCODONE AND ACETAMINOPHEN 1 TABLET: 10; 325 TABLET ORAL at 13:30

## 2024-06-15 RX ADMIN — FERROUS SULFATE TAB 325 MG (65 MG ELEMENTAL FE) 325 MG: 325 (65 FE) TAB at 08:18

## 2024-06-15 RX ADMIN — MONTELUKAST 10 MG: 10 TABLET, FILM COATED ORAL at 20:05

## 2024-06-15 RX ADMIN — BACLOFEN 10 MG: 10 TABLET ORAL at 13:30

## 2024-06-15 RX ADMIN — PREGABALIN 100 MG: 100 CAPSULE ORAL at 17:00

## 2024-06-15 RX ADMIN — IBUPROFEN 400 MG: 400 TABLET ORAL at 19:56

## 2024-06-15 RX ADMIN — Medication 1 APPLICATION: at 10:09

## 2024-06-15 RX ADMIN — IBUPROFEN 400 MG: 400 TABLET ORAL at 05:52

## 2024-06-15 RX ADMIN — Medication 1 APPLICATION: at 22:24

## 2024-06-15 NOTE — PLAN OF CARE
Goal Outcome Evaluation:  Plan of Care Reviewed With: patient        Progress: no change  Outcome Evaluation: Alert and oriented X 4. Vital signs stable. Baclofen, Percocet, and Tylenol administered for left hip pain. Scheduled Voltaren administered for left shoulder pain. Continues to use trapeze to reposition self and bedpan for bowel movements. Continue plan of care.

## 2024-06-15 NOTE — PLAN OF CARE
Goal Outcome Evaluation:           Progress: no change    Outcome Evaluation: Rsd. is alert and oriented, able to make needs known to staff.  Rsd. has rested well this shift in between care.  Medicated x1 with prn Tylenol, baclofen and oxycodone, see emar.  Rsd.  States medications effective.  Rsd. also recieving scheduled voltaren gel to L) shoulder and L) elbow.  Rsd. tolerating well, states effective.  Call light and personal items within reach, Rsd. reminded to use call light for assistance, verbalizes understanding.  Rsd. refuses turns.  Weight shifts with trapeze bar.  Skin care completed to Stanley. lower legs this shift.  Aveeno lotion applied.  Rsd. refuses to transfer or get out of bed this shift. Dressing changed to R) lower limb. X1-2 assist to turn and place on bedpan.  Activity encouraged.  WIll continue to monitor and notify on-coming staff.  Current plan of care remains in place this shift.

## 2024-06-16 LAB
GLUCOSE BLDC GLUCOMTR-MCNC: 106 MG/DL (ref 70–99)
GLUCOSE BLDC GLUCOMTR-MCNC: 128 MG/DL (ref 70–99)
GLUCOSE BLDC GLUCOMTR-MCNC: 166 MG/DL (ref 70–99)
GLUCOSE BLDC GLUCOMTR-MCNC: 89 MG/DL (ref 70–99)

## 2024-06-16 PROCEDURE — 63710000001 INSULIN GLARGINE PER 5 UNITS: Performed by: INTERNAL MEDICINE

## 2024-06-16 PROCEDURE — 82948 REAGENT STRIP/BLOOD GLUCOSE: CPT

## 2024-06-16 PROCEDURE — 63710000001 INSULIN GLARGINE PER 5 UNITS: Performed by: PHYSICIAN ASSISTANT

## 2024-06-16 PROCEDURE — 82948 REAGENT STRIP/BLOOD GLUCOSE: CPT | Performed by: INTERNAL MEDICINE

## 2024-06-16 PROCEDURE — 63710000001 INSULIN LISPRO (HUMAN) PER 5 UNITS: Performed by: PHYSICIAN ASSISTANT

## 2024-06-16 RX ADMIN — APIXABAN 5 MG: 5 TABLET, FILM COATED ORAL at 10:01

## 2024-06-16 RX ADMIN — DICLOFENAC SODIUM 4 G: 10 GEL TOPICAL at 10:03

## 2024-06-16 RX ADMIN — DICLOFENAC SODIUM 4 G: 10 GEL TOPICAL at 20:00

## 2024-06-16 RX ADMIN — LISINOPRIL 2.5 MG: 2.5 TABLET ORAL at 10:01

## 2024-06-16 RX ADMIN — PREGABALIN 100 MG: 100 CAPSULE ORAL at 10:01

## 2024-06-16 RX ADMIN — BACLOFEN 10 MG: 10 TABLET ORAL at 22:55

## 2024-06-16 RX ADMIN — MONTELUKAST 10 MG: 10 TABLET, FILM COATED ORAL at 20:03

## 2024-06-16 RX ADMIN — Medication 10 MG: at 20:03

## 2024-06-16 RX ADMIN — OXYCODONE AND ACETAMINOPHEN 1 TABLET: 10; 325 TABLET ORAL at 22:55

## 2024-06-16 RX ADMIN — PREGABALIN 100 MG: 100 CAPSULE ORAL at 20:03

## 2024-06-16 RX ADMIN — Medication 500 MG: at 20:03

## 2024-06-16 RX ADMIN — DICLOFENAC SODIUM 4 G: 10 GEL TOPICAL at 02:13

## 2024-06-16 RX ADMIN — DICLOFENAC SODIUM 4 G: 10 GEL TOPICAL at 14:13

## 2024-06-16 RX ADMIN — FERROUS SULFATE TAB 325 MG (65 MG ELEMENTAL FE) 325 MG: 325 (65 FE) TAB at 10:01

## 2024-06-16 RX ADMIN — OXYCODONE AND ACETAMINOPHEN 1 TABLET: 10; 325 TABLET ORAL at 14:46

## 2024-06-16 RX ADMIN — FUROSEMIDE 40 MG: 40 TABLET ORAL at 10:01

## 2024-06-16 RX ADMIN — CYANOCOBALAMIN TAB 500 MCG 1000 MCG: 500 TAB at 10:01

## 2024-06-16 RX ADMIN — Medication 1 APPLICATION: at 10:03

## 2024-06-16 RX ADMIN — BACLOFEN 10 MG: 10 TABLET ORAL at 14:48

## 2024-06-16 RX ADMIN — Medication 500 MG: at 10:01

## 2024-06-16 RX ADMIN — EMPAGLIFLOZIN 10 MG: 10 TABLET, FILM COATED ORAL at 10:01

## 2024-06-16 RX ADMIN — INSULIN GLARGINE 35 UNITS: 100 INJECTION, SOLUTION SUBCUTANEOUS at 10:01

## 2024-06-16 RX ADMIN — OXYCODONE AND ACETAMINOPHEN 1 TABLET: 10; 325 TABLET ORAL at 06:35

## 2024-06-16 RX ADMIN — BACLOFEN 10 MG: 10 TABLET ORAL at 06:35

## 2024-06-16 RX ADMIN — ATORVASTATIN CALCIUM 10 MG: 10 TABLET, FILM COATED ORAL at 20:03

## 2024-06-16 RX ADMIN — ACETAMINOPHEN 650 MG: 325 TABLET ORAL at 02:18

## 2024-06-16 RX ADMIN — SERTRALINE HYDROCHLORIDE 50 MG: 50 TABLET ORAL at 20:03

## 2024-06-16 RX ADMIN — INSULIN LISPRO 3 UNITS: 100 INJECTION, SOLUTION INTRAVENOUS; SUBCUTANEOUS at 20:02

## 2024-06-16 RX ADMIN — CETIRIZINE HYDROCHLORIDE 10 MG: 10 TABLET, FILM COATED ORAL at 10:01

## 2024-06-16 RX ADMIN — APIXABAN 5 MG: 5 TABLET, FILM COATED ORAL at 20:03

## 2024-06-16 RX ADMIN — PREGABALIN 100 MG: 100 CAPSULE ORAL at 17:33

## 2024-06-16 RX ADMIN — INSULIN GLARGINE 22 UNITS: 100 INJECTION, SOLUTION SUBCUTANEOUS at 20:02

## 2024-06-16 NOTE — PLAN OF CARE
Goal Outcome Evaluation:  Plan of Care Reviewed With: patient        Progress: no change  Outcome Evaluation: Alert and oriented X 4. Able to communicate needs. Baclofen, Percocet, Tylenol, and Motrin adminstered for pain to left shoulder and hip. Continues to use bedpan and urinal. Trapeze for repositioning. No significant change.

## 2024-06-16 NOTE — PLAN OF CARE
Goal Outcome Evaluation:              Outcome Evaluation: AOx4. Pain treated per MAR. Trapeze utilized for repositioning. Call light within reach.

## 2024-06-17 LAB
ANION GAP SERPL CALCULATED.3IONS-SCNC: 6.2 MMOL/L (ref 5–15)
BUN SERPL-MCNC: 16 MG/DL (ref 8–23)
BUN/CREAT SERPL: 34.8 (ref 7–25)
CALCIUM SPEC-SCNC: 8 MG/DL (ref 8.6–10.5)
CHLORIDE SERPL-SCNC: 106 MMOL/L (ref 98–107)
CO2 SERPL-SCNC: 24.8 MMOL/L (ref 22–29)
CREAT SERPL-MCNC: 0.46 MG/DL (ref 0.76–1.27)
DEPRECATED RDW RBC AUTO: 49.3 FL (ref 37–54)
EGFRCR SERPLBLD CKD-EPI 2021: 116.1 ML/MIN/1.73
ERYTHROCYTE [DISTWIDTH] IN BLOOD BY AUTOMATED COUNT: 15.4 % (ref 12.3–15.4)
GLUCOSE BLDC GLUCOMTR-MCNC: 118 MG/DL (ref 70–99)
GLUCOSE BLDC GLUCOMTR-MCNC: 119 MG/DL (ref 70–99)
GLUCOSE BLDC GLUCOMTR-MCNC: 133 MG/DL (ref 70–99)
GLUCOSE BLDC GLUCOMTR-MCNC: 146 MG/DL (ref 70–99)
GLUCOSE SERPL-MCNC: 90 MG/DL (ref 65–99)
HCT VFR BLD AUTO: 34.2 % (ref 37.5–51)
HGB BLD-MCNC: 11 G/DL (ref 13–17.7)
MCH RBC QN AUTO: 27.9 PG (ref 26.6–33)
MCHC RBC AUTO-ENTMCNC: 32.2 G/DL (ref 31.5–35.7)
MCV RBC AUTO: 86.8 FL (ref 79–97)
NT-PROBNP SERPL-MCNC: 59.7 PG/ML (ref 0–900)
PLATELET # BLD AUTO: 76 10*3/MM3 (ref 140–450)
PMV BLD AUTO: 11.7 FL (ref 6–12)
POTASSIUM SERPL-SCNC: 4.1 MMOL/L (ref 3.5–5.2)
RBC # BLD AUTO: 3.94 10*6/MM3 (ref 4.14–5.8)
SODIUM SERPL-SCNC: 137 MMOL/L (ref 136–145)
WBC NRBC COR # BLD AUTO: 3.6 10*3/MM3 (ref 3.4–10.8)

## 2024-06-17 PROCEDURE — 80048 BASIC METABOLIC PNL TOTAL CA: CPT | Performed by: PHYSICIAN ASSISTANT

## 2024-06-17 PROCEDURE — 82948 REAGENT STRIP/BLOOD GLUCOSE: CPT

## 2024-06-17 PROCEDURE — 63710000001 INSULIN GLARGINE PER 5 UNITS: Performed by: INTERNAL MEDICINE

## 2024-06-17 PROCEDURE — 85027 COMPLETE CBC AUTOMATED: CPT | Performed by: PHYSICIAN ASSISTANT

## 2024-06-17 PROCEDURE — 82948 REAGENT STRIP/BLOOD GLUCOSE: CPT | Performed by: INTERNAL MEDICINE

## 2024-06-17 PROCEDURE — 63710000001 INSULIN GLARGINE PER 5 UNITS: Performed by: PHYSICIAN ASSISTANT

## 2024-06-17 PROCEDURE — 83880 ASSAY OF NATRIURETIC PEPTIDE: CPT | Performed by: PHYSICIAN ASSISTANT

## 2024-06-17 RX ADMIN — INSULIN GLARGINE 22 UNITS: 100 INJECTION, SOLUTION SUBCUTANEOUS at 20:32

## 2024-06-17 RX ADMIN — SERTRALINE HYDROCHLORIDE 50 MG: 50 TABLET ORAL at 20:32

## 2024-06-17 RX ADMIN — IBUPROFEN 400 MG: 400 TABLET ORAL at 04:35

## 2024-06-17 RX ADMIN — INSULIN GLARGINE 35 UNITS: 100 INJECTION, SOLUTION SUBCUTANEOUS at 10:00

## 2024-06-17 RX ADMIN — ACETAMINOPHEN 650 MG: 325 TABLET ORAL at 22:24

## 2024-06-17 RX ADMIN — PREGABALIN 100 MG: 100 CAPSULE ORAL at 20:32

## 2024-06-17 RX ADMIN — DICLOFENAC SODIUM 4 G: 10 GEL TOPICAL at 13:40

## 2024-06-17 RX ADMIN — OXYCODONE AND ACETAMINOPHEN 1 TABLET: 10; 325 TABLET ORAL at 15:41

## 2024-06-17 RX ADMIN — DICLOFENAC SODIUM 4 G: 10 GEL TOPICAL at 03:10

## 2024-06-17 RX ADMIN — BACLOFEN 10 MG: 10 TABLET ORAL at 07:06

## 2024-06-17 RX ADMIN — DICLOFENAC SODIUM 4 G: 10 GEL TOPICAL at 20:34

## 2024-06-17 RX ADMIN — Medication 10 MG: at 20:32

## 2024-06-17 RX ADMIN — APIXABAN 5 MG: 5 TABLET, FILM COATED ORAL at 10:00

## 2024-06-17 RX ADMIN — OXYCODONE AND ACETAMINOPHEN 1 TABLET: 10; 325 TABLET ORAL at 07:06

## 2024-06-17 RX ADMIN — APIXABAN 5 MG: 5 TABLET, FILM COATED ORAL at 20:32

## 2024-06-17 RX ADMIN — LISINOPRIL 2.5 MG: 2.5 TABLET ORAL at 10:00

## 2024-06-17 RX ADMIN — ATORVASTATIN CALCIUM 10 MG: 10 TABLET, FILM COATED ORAL at 20:32

## 2024-06-17 RX ADMIN — FERROUS SULFATE TAB 325 MG (65 MG ELEMENTAL FE) 325 MG: 325 (65 FE) TAB at 07:49

## 2024-06-17 RX ADMIN — DICLOFENAC SODIUM 4 G: 10 GEL TOPICAL at 07:48

## 2024-06-17 RX ADMIN — IBUPROFEN 400 MG: 400 TABLET ORAL at 17:36

## 2024-06-17 RX ADMIN — CYANOCOBALAMIN TAB 500 MCG 1000 MCG: 500 TAB at 10:00

## 2024-06-17 RX ADMIN — ACETAMINOPHEN 650 MG: 325 TABLET ORAL at 13:39

## 2024-06-17 RX ADMIN — BACLOFEN 10 MG: 10 TABLET ORAL at 15:41

## 2024-06-17 RX ADMIN — CETIRIZINE HYDROCHLORIDE 10 MG: 10 TABLET, FILM COATED ORAL at 10:00

## 2024-06-17 RX ADMIN — Medication 1 APPLICATION: at 10:26

## 2024-06-17 RX ADMIN — Medication 500 MG: at 10:00

## 2024-06-17 RX ADMIN — ACETAMINOPHEN 650 MG: 325 TABLET ORAL at 03:48

## 2024-06-17 RX ADMIN — Medication 500 MG: at 20:32

## 2024-06-17 RX ADMIN — PREGABALIN 100 MG: 100 CAPSULE ORAL at 15:41

## 2024-06-17 RX ADMIN — PREGABALIN 100 MG: 100 CAPSULE ORAL at 10:00

## 2024-06-17 RX ADMIN — EMPAGLIFLOZIN 10 MG: 10 TABLET, FILM COATED ORAL at 10:00

## 2024-06-17 RX ADMIN — FUROSEMIDE 40 MG: 40 TABLET ORAL at 10:00

## 2024-06-17 RX ADMIN — MONTELUKAST 10 MG: 10 TABLET, FILM COATED ORAL at 20:32

## 2024-06-17 NOTE — PLAN OF CARE
Goal Outcome Evaluation:  Plan of Care Reviewed With: patient        Progress: no change  Outcome Evaluation: Patient is alert and oriented x4. Con't to have increased pain to left leg not well controlled with medications. Stated meds help for about 2 hours then pain is right back. Edema to BLE increased this afternoon especially the right leg, ankle and foot. Patient stated it is painful to touch the right leg. Provider notified and order received for duplex venous study of lower extremities. Patient taken to vascular lab and right upper leg scanned, but due to pain during test, he refused to let the tech continue the study. Provider notified again of situation. Patient returned by transport to SNF. This shift patient received PRN Percocet and Baclofen at 0706 and again at 1541, PRN Tylenol at 1339, and PRN ibuprofen at 1736. Meds moderately effective per patient for a few hours. Voices needs. Con't current POC.

## 2024-06-17 NOTE — PLAN OF CARE
Goal Outcome Evaluation:  Plan of Care Reviewed With: patient        Progress: no change  Outcome Evaluation: Alert and oriented X 4. No significant change. Continues with PRN Baclofen, Percocet, Tylenol, and Motrin for pain management of left hip and shoulder. Schedule Voltarten also administered to left shoulder and elbow. Continue plan of care.

## 2024-06-18 LAB
BH CV LOWER VASCULAR RIGHT MID FEMORAL AUGMENT: NORMAL
BH CV LOWER VASCULAR RIGHT MID FEMORAL COMPETENT: NORMAL
BH CV LOWER VASCULAR RIGHT MID FEMORAL PHASIC: NORMAL
BH CV LOWER VASCULAR RIGHT MID FEMORAL SPONT: NORMAL
BH CV VAS PRELIMINARY FINDINGS SCRIPTING: 1
GLUCOSE BLDC GLUCOMTR-MCNC: 124 MG/DL (ref 70–99)
GLUCOSE BLDC GLUCOMTR-MCNC: 147 MG/DL (ref 70–99)
GLUCOSE BLDC GLUCOMTR-MCNC: 159 MG/DL (ref 70–99)
GLUCOSE BLDC GLUCOMTR-MCNC: 99 MG/DL (ref 70–99)

## 2024-06-18 PROCEDURE — 99309 SBSQ NF CARE MODERATE MDM 30: CPT | Performed by: PHYSICIAN ASSISTANT

## 2024-06-18 PROCEDURE — 63710000001 INSULIN GLARGINE PER 5 UNITS: Performed by: PHYSICIAN ASSISTANT

## 2024-06-18 PROCEDURE — 63710000001 INSULIN GLARGINE PER 5 UNITS: Performed by: INTERNAL MEDICINE

## 2024-06-18 PROCEDURE — 63710000001 INSULIN LISPRO (HUMAN) PER 5 UNITS: Performed by: PHYSICIAN ASSISTANT

## 2024-06-18 PROCEDURE — 82948 REAGENT STRIP/BLOOD GLUCOSE: CPT | Performed by: INTERNAL MEDICINE

## 2024-06-18 PROCEDURE — 82948 REAGENT STRIP/BLOOD GLUCOSE: CPT

## 2024-06-18 RX ADMIN — INSULIN LISPRO 3 UNITS: 100 INJECTION, SOLUTION INTRAVENOUS; SUBCUTANEOUS at 20:58

## 2024-06-18 RX ADMIN — BACLOFEN 10 MG: 10 TABLET ORAL at 10:22

## 2024-06-18 RX ADMIN — FUROSEMIDE 40 MG: 40 TABLET ORAL at 08:26

## 2024-06-18 RX ADMIN — BACLOFEN 10 MG: 10 TABLET ORAL at 02:22

## 2024-06-18 RX ADMIN — INSULIN GLARGINE 22 UNITS: 100 INJECTION, SOLUTION SUBCUTANEOUS at 20:58

## 2024-06-18 RX ADMIN — DICLOFENAC SODIUM 4 G: 10 GEL TOPICAL at 21:00

## 2024-06-18 RX ADMIN — ATORVASTATIN CALCIUM 10 MG: 10 TABLET, FILM COATED ORAL at 20:58

## 2024-06-18 RX ADMIN — APIXABAN 5 MG: 5 TABLET, FILM COATED ORAL at 08:26

## 2024-06-18 RX ADMIN — SERTRALINE HYDROCHLORIDE 50 MG: 50 TABLET ORAL at 20:58

## 2024-06-18 RX ADMIN — DICLOFENAC SODIUM 4 G: 10 GEL TOPICAL at 02:14

## 2024-06-18 RX ADMIN — INSULIN GLARGINE 35 UNITS: 100 INJECTION, SOLUTION SUBCUTANEOUS at 08:24

## 2024-06-18 RX ADMIN — DICLOFENAC SODIUM 4 G: 10 GEL TOPICAL at 15:15

## 2024-06-18 RX ADMIN — OXYCODONE AND ACETAMINOPHEN 1 TABLET: 10; 325 TABLET ORAL at 10:23

## 2024-06-18 RX ADMIN — DICLOFENAC SODIUM 4 G: 10 GEL TOPICAL at 08:27

## 2024-06-18 RX ADMIN — LISINOPRIL 2.5 MG: 2.5 TABLET ORAL at 08:25

## 2024-06-18 RX ADMIN — PREGABALIN 100 MG: 100 CAPSULE ORAL at 08:26

## 2024-06-18 RX ADMIN — PREGABALIN 100 MG: 100 CAPSULE ORAL at 16:32

## 2024-06-18 RX ADMIN — IBUPROFEN 400 MG: 400 TABLET ORAL at 04:03

## 2024-06-18 RX ADMIN — APIXABAN 5 MG: 5 TABLET, FILM COATED ORAL at 20:58

## 2024-06-18 RX ADMIN — Medication 10 MG: at 20:58

## 2024-06-18 RX ADMIN — PREGABALIN 100 MG: 100 CAPSULE ORAL at 20:58

## 2024-06-18 RX ADMIN — CYANOCOBALAMIN TAB 500 MCG 1000 MCG: 500 TAB at 08:26

## 2024-06-18 RX ADMIN — BACLOFEN 10 MG: 10 TABLET ORAL at 19:39

## 2024-06-18 RX ADMIN — CETIRIZINE HYDROCHLORIDE 10 MG: 10 TABLET, FILM COATED ORAL at 08:26

## 2024-06-18 RX ADMIN — MONTELUKAST 10 MG: 10 TABLET, FILM COATED ORAL at 20:58

## 2024-06-18 RX ADMIN — FERROUS SULFATE TAB 325 MG (65 MG ELEMENTAL FE) 325 MG: 325 (65 FE) TAB at 08:26

## 2024-06-18 RX ADMIN — Medication 500 MG: at 20:58

## 2024-06-18 RX ADMIN — OXYCODONE AND ACETAMINOPHEN 1 TABLET: 10; 325 TABLET ORAL at 19:39

## 2024-06-18 RX ADMIN — Medication 500 MG: at 08:26

## 2024-06-18 RX ADMIN — EMPAGLIFLOZIN 10 MG: 10 TABLET, FILM COATED ORAL at 08:26

## 2024-06-18 RX ADMIN — OXYCODONE AND ACETAMINOPHEN 1 TABLET: 10; 325 TABLET ORAL at 02:22

## 2024-06-18 NOTE — SIGNIFICANT NOTE
Wound Eval / Progress Noted    KEO Dominguez     Patient Name: Jose Shaikh  : 1960  MRN: [id number]  Today's Date: 2024                 Admit Date: 2023    Visit Dx:    ICD-10-CM ICD-9-CM   1. Difficulty in walking  R26.2 719.7   2. Type 2 diabetes mellitus with other specified complication, unspecified whether long term insulin use  E11.69 250.80         Debility    Ulcer of right foot    Ulcerated, foot, left, limited to breakdown of skin    Onychomycosis        Past Medical History:   Diagnosis Date    Absence of toe of right foot     Acute osteomyelitis of left calcaneus  2021    Anxiety and depression     Arthritis     Cancer     Chronic pain     STATES HIS PAIN IS 10/10 AAT    Claustrophobia     Corns and callus     Diabetic ulcer of left heel associated with type 2 DM 2021    Diabetic ulcer of left heel associated with type 2 DM 2021    Diabetic ulcer of right midfoot associated with type 2 DM 2021    Difficulty walking     Essential hypertension 2021    Hammertoe     Hyperlipidemia LDL goal <100 2021    Ingrown toenail     Obesity     Paroxysmal atrial fibrillation 2021    Polyneuropathy     Pressure ulcer, stage 1     Tinea unguium     Type 2 diabetes mellitus with polyneuropathy         Past Surgical History:   Procedure Laterality Date    CYST REMOVAL      center of back; benign    EYE SURGERY      INCISION AND DRAINAGE ABSCESS      back    INCISION AND DRAINAGE LEG Right 12/10/2021    Procedure: INCISION AND DRAINAGE LOWER EXTREMITY;  Surgeon: Ash Leyva DPM;  Location: [facility] MAIN OR;  Service: Podiatry;  Laterality: Right;    OTHER SURGICAL HISTORY      Surgical clips left foot    TOE SURGERY Right     Removal of 5th toe    TRANS METATARSAL AMPUTATION Right 2021    Procedure: AMPUTATION TRANS METATARSAL;  Surgeon: Ash Leyva DPM;  Location: [facility] MAIN OR;  Service: Podiatry;  Laterality: Right;    VASCULAR  SURGERY      WRIST SURGERY Left     repair of injury         Physical Assessment:     06/18/24 1125   Wound 06/06/24 1402 Right distal foot Blisters   Placement Date/Time: 06/06/24 1402   Side: Right  Orientation: distal  Location: foot  Primary Wound Type: Blisters   Wound Image    Dressing Appearance intact;no drainage   Closure None   Base red;pink   Periwound intact;dry;pink   Periwound Temperature warm   Periwound Skin Turgor soft   Edges rolled/closed   Wound Length (cm) 1.2 cm   Wound Width (cm) 0.6 cm   Wound Surface Area (cm^2) 0.72 cm^2   Drainage Amount none   Care, Wound cleansed with;sterile normal saline   Dressing Care dressing removed;open to air   Rash 04/06/24 1659 Left calf plaque   Placement Date/Time: 04/06/24 1659   Side: Left  Location: calf  Type: plaque  Additional Comments: raised red and warm area to lt calf   Wound Image    Distribution localized   Color pink;red   Configuration/Shape oval   Borders distinct   Characteristics raised   Rash 04/17/24 1443 Right lower leg macular   Placement Date/Time: 04/17/24 1443   Side: Right  Orientation: lower  Location: leg  Type: macular   Wound Image     Distribution localized   Color pink;red   Configuration/Shape asymmetric   Borders irregular   Characteristics flat          Wound Check / Follow-up: Patient seen today for wound follow-up.  Patient is currently lying in bed awake, alert oriented.  Explained purpose for visit and patient is agreeable to assessment however he states that he is not able to roll today and will only allow assessment of anterior aspect of body.    Patient continues to have moisture and redness noted within the abdominal fold and to bilateral groin and perineum.  There is no erosion or significant breakdown at this time.  No fungal presentation recommending to continue skin care and moisture prevention.    Patient does continue to have chronic discoloration to bilateral lower extremities.  The left posterior lower leg  does continue to have a raised oval shape area.  There is no drainage noted.  This area is overall stable and has remained consistent.  Right anterior and posterior lower leg with diffuse redness.  These have also remained stable.  Recommending to continue skin care/hygiene.    Right distal foot has 1 area of tissue discoloration remaining.  This this area as measured above.  There is some discoloration noted with superficial tissue loss.  No drainage present cleansed with normal saline and gauze recommending to currently leave open to air and to apply topical treatments.  If patient does need to get up with therapy and apply splinting device will need to apply preventative dressing to prevent further trauma.    Impression: Traumatic injury to right distal foot.  Chronic discoloration of bilateral lower extremities.  Raised area to left posterior lower leg.  Moisture and redness within skin folds.    Short term goals: Regain skin integrity.  Topical treatments.  Moisture prevention and skin protection.  Pressure reduction.    Bettye Crews RN    6/18/2024    15:57 EDT

## 2024-06-18 NOTE — PLAN OF CARE
Goal Outcome Evaluation:  Plan of Care Reviewed With: patient        Progress: improving  Outcome Evaluation: Patient reports improvement in edema to b/l LE. Medicated this am for pain with prn pain medication and muscle relaxers. Blood sugars wnl, no  sliding scale indicated. Seen by wound nurse, new pics entered into chart.

## 2024-06-18 NOTE — PROGRESS NOTES
[facility]   Hospitalist Progress Note  Date: 2024  Patient Name: Jose Shaikh  : 1960  MRN: [id number]  Date of admission: 2023      Subjective   Subjective     Chief Complaint: Weakness    Summary: Jose Shaikh is a 63 y.o. male  initially hospitalized on 9/10/2023, prolonged hospitalization for treatment of management of generalized weakness, deconditioning, with acute issues of diarrhea, C. difficile negative.  Diarrhea improved.  Had small hematoma after ambulating on his foot.  Unfortunately with exhaustive efforts to try to place this gentleman after 39 days of hospitalization, we are unable to find a facility that will accept him.  He has no further acute needs or requirements for inpatient monitoring and management, his labs and vitals are stable, he is tolerating oral intake, and has been refusing turns and repositionings and other interventions with nursing staff despite recommendations.  Patient discharged in hemodynamically stable condition on 10/19/2023 to follow-up with PCP within 1 week. Unfortunately we cannot solve all of his social issues during this hospitalization due to lack of resources and participation from the patient's perspective despite all our efforts.  Patient has a financial means of making arrangements at home.  Patient appealed his discharge.  Lost his appeal.  LCD: 10/20/23, PFL beginning: 10/21/23 @ 12pm.  Due to an inability to place the patient in a.m. nursing home he has been placed in our skilled nursing facility until arrangements can be made or until he is able to care for himself.      Interval Followup: 2024    Nursing staff concerned about lower extremity swelling today.  Better today.  On exam, looks similar to prior.  I do not suspect cellulitis.  Has chronic stasis changes.  Had right venous Doppler, which was negative for R thigh.  He refused to proceed past the thigh during testing.  He is already anticoagulated Eliquis for his atrial  fibrillation anyway.  Asking about dietary changes.  Would like veggie burdavid added if possible; has sodium restrictions on diet.  Has an appointment July 9 with specialist in [place] regarding hip  White count normal  Platelets low but stable  Creatinine 0.4  BNP 57      Review of systems: All systems reviewed and negative except what is noted above in interval follow-up   Objective   Objective     Vitals:   Temp:  [97.3 °F (36.3 °C)-98.1 °F (36.7 °C)] 97.3 °F (36.3 °C)  Heart Rate:  [69] 69  Resp:  [20] 20  BP: ()/(52-57) 114/52    Physical Exam   Constitutional: No distress.  Alert and conversational.  Respiratory: No wheeze noted.  GI: Abdomen: Obese  Neuro/psych:  Calm mood.  No dysarthria.  Extremities: Some lower extremity edema noted    Result Review    Result Review:  I have personally reviewed the results from the time of this admission to 6/18/2024 16:08 EDT and agree with these findings:  [x]  Laboratory  [x]  Microbiology  []  Radiology  []  EKG/Telemetry   []  Cardiology/Vascular   []  Pathology  []  Old records  []  Other:    Assessment & Plan   Assessment / Plan   Assessment:  Hospital-acquired weakness  Hx of Influenza A  Hx of C. difficile diarrhea treated  Generalized weakness  Deconditioning  DM (Kami Garcia and PCP)  HTN  PAF (on Eliquis; previously seen Dr. Duval)  Morbid obesity with BMI 44  Debility with wheelchair dependence  Hx of severe left hip pain  Severe degenerative joint disease of lower extremities  Hx L calcaneus osteomyelitis  Chronic venous stasis  Hx R transmetatarsal  Chronic bilateral foot wounds with right transmetatarsal amputation, healing by secondary intention (wound care clinic; podiatrist [place])  Thrombocytopenia, resolved      Plan:    Dietitian consult requested  Has orthopedic referral July 9  Continue PT/OT  As he continues to lose weight on Ozempic, he may be a candidate for surgical options  Appreciate input from wound care consult  As needed  Lasix  Continue basal insulin and SSI per protocol titrate as needed  Continue Eliquis for stroke prophylaxis  Continue probiotics  Discussed with RN    DVT prophylaxis:  Pharmacologic VTE prophylaxis orders are present.    CODE STATUS:   Code Status (Patient has no pulse and is not breathing): CPR (Attempt to Resuscitate)  Medical Interventions (Patient has pulse or is breathing): Full Support

## 2024-06-18 NOTE — PLAN OF CARE
Goal Outcome Evaluation:           Progress: no change  Outcome Evaluation: Rsd. is alert and oriented, able to make needs known to staff.  Rsd. has rested well this shift in between care.  Medicated x1 with prn Tylenol, motrin, baclofen and oxycodone, see emar.  Rsd.  States medications effective.  Rsd. also recieving scheduled voltaren gel to L) shoulder and L) elbow.  Rsd. tolerating well, states effective.  Call light and personal items within reach, Rsd. reminded to use call light for assistance, verbalizes understanding.  Rsd. refuses turns.  Weight shifts with trapeze bar. Rsd. Refused skin care to mike. Lower ext. See emar, and physical assessment documentation. Rsd. refuses to transfer or get out of bed this shift.  X1-2 assist to turn and place on bedpan.  Activity encouraged.  WIll continue to monitor and notify on-coming staff.  Current plan of care remains in place this shift.

## 2024-06-18 NOTE — PROGRESS NOTES
"Nursing Facility Medication Regimen Review    Potentially Clinically Significant Medication Issues during this review: []Identified   [x]Not identified    Provider acknowledgement required?   []Yes   [x]No    Jose Shaikh is a 63 y.o.male admitted by Jose Maya MD , on 11/6/2023  1:34 PM , for Debility [R53.81]    Visit Vitals  /52 (BP Location: Left arm, Patient Position: Lying)   Pulse 69   Temp 97.3 °F (36.3 °C) (Oral)   Resp 20   Ht 188 cm (74.02\")   Wt (!) 151 kg (333 lb 12.4 oz)   SpO2 99%   BMI 42.84 kg/m²        Lab Results   Component Value Date    GLUCOSE 90 06/17/2024    BUN 16 06/17/2024    CREATININE 0.46 (L) 06/17/2024    EGFRIFNONA 134 12/20/2021    BCR 34.8 (H) 06/17/2024    K 4.1 06/17/2024    CO2 24.8 06/17/2024    CALCIUM 8.0 (L) 06/17/2024    ALBUMIN 3.0 (L) 06/06/2024    LABIL2 1.3 (L) 08/07/2019    AST 50 (H) 04/08/2024    ALT 36 04/08/2024    WBC 3.60 06/17/2024    HGB 11.0 (L) 06/17/2024    HCT 34.2 (L) 06/17/2024    MCV 86.8 06/17/2024    PLT 76 (L) 06/17/2024        Active Ambulatory Problems     Diagnosis Date Noted    Diabetic ulcer of left heel associated with type 2 DM 07/06/2021    Acute osteomyelitis of left calcaneus  08/18/2021    Diabetic ulcer of left heel associated with type 2 DM 08/18/2021    Diabetic ulcer of right midfoot associated with type 2 DM 08/18/2021    Paroxysmal atrial fibrillation 08/31/2021    Essential hypertension 08/31/2021    Hyperlipidemia LDL goal <100 08/31/2021    Cellulitis and abscess of foot 12/01/2021    High alkaline phosphatase level 12/19/2021    Osteomyelitis 10/01/2022    Onychomycosis 10/02/2022    Onychocryptosis 10/02/2022    Foot pain, bilateral 10/02/2022    Osteomyelitis of foot, right, acute 10/05/2022    Cellulitis of right foot 10/05/2022    Type 2 diabetes mellitus, with long-term current use of insulin 11/08/2022    Class 3 severe obesity due to excess calories with serious comorbidity and body mass index (BMI) of 45.0 to " 49.9 in adult 11/08/2022    Anxiety disorder, unspecified 10/07/2022    Claustrophobia 05/02/2022    Dependence on wheelchair 10/27/2022    Depression, unspecified 05/02/2022    Long term (current) use of anticoagulants 05/02/2022    Long term (current) use of oral hypoglycemic drugs 05/02/2022    Wound of foot 05/02/2022    Ulcer of right foot 05/02/2022    Orthostatic hypotension 10/07/2022    Other chronic osteomyelitis, right ankle and foot 10/07/2022    Personal history of nicotine dependence 05/02/2022    Thrombocytopenia, unspecified 10/07/2022    Unspecified open wound, right foot, initial encounter 04/03/2023    Diabetic foot infection 04/03/2023    Subacute osteomyelitis of right foot 04/06/2023    Right foot pain 05/12/2023    Sepsis 05/12/2023    Onychomycosis 05/22/2023    Foot pain, left 05/22/2023    Impaired mobility and ADLs 06/16/2023    Absence of toe of right foot 07/11/2023    Corns and callosity 07/11/2023    Disability of walking 07/11/2023    Fracture 07/11/2023    Limb swelling 07/11/2023    Polyneuropathy 07/11/2023    Pressure ulcer, stage 1 07/11/2023    Shortness of breath 07/11/2023    Generalized weakness 09/10/2023     Resolved Ambulatory Problems     Diagnosis Date Noted    Lactic acidosis 12/01/2021    Abscess of back 12/20/2021     Past Medical History:   Diagnosis Date    Anxiety and depression     Arthritis     Cancer     Chronic pain     Corns and callus     Difficulty walking     Hammertoe     Ingrown toenail     Obesity     Tinea unguium     Type 2 diabetes mellitus with polyneuropathy         Hospital Medications (active)         Dose Frequency Start End Indication    !Patient Home Medications Stored in Pharmacy  2 Times Daily 4/17/2024 -- Informational    Admin Instructions: [facility] medications [facility]. Immediately prior to discharge, contact pharmacy to ensure meds are sent home with patient.     Route: Does not apply     !Patient Home Medications Stored on  Unit  2 Times Daily 2/21/2024 -- Informational    Admin Instructions: Patient has home medication(s) stored on the nursing unit. Please remove and give to patient before discharge.     Route: Does not apply     (HOME MED) - Semaglutide (1 MG/DOSE) (OZEMPIC) solution pen-injector 1 mg 1 mg Every 7 Days 4/17/2024 -- T2DM, weight loss    Admin Instructions: Inject 1 mg under the skin into the appropriate area as directed every 7 days     Notes to Pharmacy: ID by Herrera Sinha, PharmD  04/17/24  17:14 EDT    4/17: 1 new syringe brought in for 4 doses of 1 mg    [facility]     Route: Subcutaneous     acetaminophen (TYLENOL) tablet 650 mg 650 mg Every 4 Hours PRN 3/18/2024 -- PRN mild pain, headache, fever    Admin Instructions: Based on patient request - if ordered for moderate or severe pain, provider allows for administration of a medication prescribed for a lower pain scale.    Do not exceed 4 grams of acetaminophen in a 24 hr period. Max dose of 2gm for AST/ALT greater than 120 units/L.    If given for pain, use the following pain scale:   Mild Pain = Pain Score of 1-3, CPOT 1-2  Moderate Pain = Pain Score of 4-6, CPOT 3-4  Severe Pain = Pain Score of 7-10, CPOT 5-8     Route: Oral     apixaban (ELIQUIS) tablet 5 mg 5 mg Every 12 Hours Scheduled 4/11/2024 -- A.fib    Admin Instructions: .  Tablet may be crushed and suspended in 60 mL of water or D5W and immediately delivered via NG tube.     Route: Oral     atorvastatin (LIPITOR) tablet 10 mg 10 mg Nightly 4/10/2024 -- Hyperlipidemia    Admin Instructions: Avoid grapefruit juice.     Route: Oral     baclofen (LIORESAL) tablet 10 mg 10 mg 3 Times Daily PRN 5/18/2024 -- PRN muscle spasms    Admin Instructions: .  Take with food if GI upset occurs.     Route: Oral     benzonatate (TESSALON) capsule 100 mg 100 mg 3 Times Daily PRN 1/18/2024 -- PRN cough    Admin Instructions: Do not crush or chew the capsules or tablets. The drug may not work as designed if the capsule  or tablet is crushed or chewed. Swallow whole.  Swallow whole.  Do not crush, chew, or open capsule.     Route: Oral     bismuth subsalicylate (PEPTO BISMOL) chewable tablet 524 mg 524 mg 3 Times Daily PRN 2/5/2024 -- PRN indigestion, heartburn, diarrhea    Admin Instructions: Maximum 4192 mg per 24 hours.     Route: Oral     cetirizine (zyrTEC) tablet 10 mg 10 mg Daily 5/8/2024 -- Allergies    Route: Oral     Cosign for Ordering: Accepted by Jose Maya MD on 5/11/2024  8:46 PM     dextrose (D50W) (25 g/50 mL) IV injection 25 g 25 g Every 15 Minutes PRN 11/6/2023 -- PRN hypoglycemia (blood sugar less than 70)    Admin Instructions: .Blood sugar less than 70; patient has IV access - Unresponsive, NPO or Unable To Safely Swallow     Route: Intravenous     dextrose (GLUTOSE) oral gel 15 g 15 g Every 15 Minutes PRN 11/6/2023 -- PRN hypoglycemia (blood sugar less than 70)    Admin Instructions: .BS<70, Patient Alert, Is not NPO, Can safely swallow.     Route: Oral     Diclofenac Sodium (VOLTAREN) 1 % gel 4 g 4 g Every 6 Hours 5/27/2024 -- Bilateral knee pain    Admin Instructions: Apply to bilateral hip and knee   Do not cover area with occlusive dressing or apply any other med to affected area. Do not wash affected area for 1 hr after applying. Use dosing card for correct dose measurement.     Route: Topical     dimethicone (AVEENO) 1.3 % lotion 1 Application 1 Application User Specified 5/23/2024 -- Dry skin    Admin Instructions: Apply to BLE / feet.     Route: Topical     Cosign for Ordering: Accepted by Jose Maya MD on 5/23/2024  8:12 PM     empagliflozin (JARDIANCE) tablet 10 mg 10 mg Daily 11/7/2023 -- T2DM    Admin Instructions: .     Route: Oral     ferrous sulfate tablet 325 mg 325 mg Daily With Breakfast 11/21/2023 -- Anemia    Admin Instructions: .  Swallow whole. Do not crush, split, or chew. Take with food if GI upset occurs.     Route: Oral     Cosign for Ordering: Accepted by Max Mehta  MD [place] on 11/21/2023 11:34 AM     furosemide (LASIX) tablet 40 mg 40 mg Daily 6/6/2024 -- Edema    Admin Instructions: Hold for SBP less than 100, DBP less than 50     Route: Oral     Cosign for Ordering: Accepted by Jose Maya MD on 6/7/2024 10:19 AM     glucagon (GLUCAGEN) injection 1 mg 1 mg Every 15 Minutes PRN 11/6/2023 -- PRN hypoglycemia (blood sugar less than 70)    Admin Instructions: Blood Glucose Less Than 70 - Patient Without IV Access - Unresponsive, NPO or Unable To Safely Swallow.  Reconstitute powder for injection by adding 1 mL of -supplied sterile diluent or sterile water for injection to a vial containing 1 mg of the drug, to provide solutions containing 1 mg/mL. Shake vial gently to dissolve.     Route: Intramuscular     Hydrocortisone (Perianal) (ANUSOL-HC) 2.5 % rectal cream  2 Times Daily PRN 3/20/2024 -- PRN hemorrhoids    Route: Rectal     Cosign for Ordering: Accepted by Cailin Acosta MD on 3/20/2024  4:51 PM     ibuprofen (ADVIL,MOTRIN) tablet 400 mg 400 mg 2 Times Daily PRN 1/16/2024 -- PRN mild pain    Admin Instructions: May alternate with tylenol  Based on patient request - if ordered for moderate or severe pain, provider allows for administration of a medication prescribed for a lower pain scale.    Mucous membrane irritant. Do not crush or chew tablet or capsule unless administered through a feeding tube.  If given for pain, use the following pain scale:  Mild Pain = Pain Score of 1-3, CPOT 1-2  Moderate Pain = Pain Score of 4-6, CPOT 3-4  Severe Pain = Pain Score of 7-10, CPOT 5-8     Route: Oral     Cosign for Ordering: Accepted by Max Mehta MD on 1/17/2024 10:44 AM     insulin glargine (LANTUS, SEMGLEE) injection 22 Units 22 Units Nightly 6/5/2024 -- T2DM    Admin Instructions: Do not hold basal insulin without an order. Consider requesting a dose edit, if needed.       Route: Subcutaneous     Cosign for Ordering: Accepted by Jose Maya MD  "on 6/7/2024 10:19 AM     insulin glargine (LANTUS, SEMGLEE) injection 35 Units 35 Units Daily 5/22/2024 -- T2DM    Admin Instructions: Do not hold basal insulin without an order. Consider requesting a dose edit, if needed.       Route: Subcutaneous     Insulin Lispro (humaLOG) injection 3-14 Units 3-14 Units 4 Times Daily Before Meals & Nightly 5/19/2024 -- T2DM    Admin Instructions: Correction Insulin - Moderate-High Dose (Total Insulin Dose 60-80 units/day, Patient Taking Insulin at Home)    Blood Glucose 150-199 mg/dL - 3 units  Blood Glucose 200-249 mg/dL - 5 units  Blood Glucose 250-299 mg/dL - 8 units  Blood Glucose 300-349 mg/dL - 10 units  Blood Glucose 350-400 mg/dL - 12 units  Blood Glucose Greater Than 400 mg/dL - 14 units & Call Provider   Caution: Look alike/sound alike drug alert     Route: Subcutaneous     Cosign for Ordering: Accepted by Cailin Acosta MD on 5/19/2024  3:01 PM     lisinopril (PRINIVIL,ZESTRIL) tablet 2.5 mg 2.5 mg Every 24 Hours Scheduled 11/7/2023 -- Hypertension    Admin Instructions: Hold for SBP less than 100.     Route: Oral     melatonin tablet 10 mg 10 mg Nightly 4/10/2024 -- Insomnia    Route: Oral     montelukast (SINGULAIR) tablet 10 mg 10 mg Nightly 5/23/2024 -- Allergies/sinus pain    Route: Oral     ondansetron (ZOFRAN) injection 4 mg 4 mg Every 6 Hours PRN 11/6/2023 -- PRN nausea, vomiting    Admin Instructions: .If BOTH ondansetron (ZOFRAN) and promethazine (PHENERGAN) are ordered use ondansetron first and THEN promethazine IF ondansetron is ineffective.     Route: Intravenous     ondansetron ODT (ZOFRAN-ODT) disintegrating tablet 4 mg 4 mg Every 6 Hours PRN 11/6/2023 -- PRN nausea, vomiting    Admin Instructions: .\"If multiple N/V medications ordered, use in the following order: Ondansetron, Prochlorperazine, Promethazine. Use PO unless patient refuses or patient unable to swallow.\"  Place on tongue and allow to dissolve.     Route: Oral     " oxyCODONE-acetaminophen (PERCOCET)  MG per tablet 1 tablet 1 tablet Every 8 Hours PRN 5/19/2024 6/26/2024 PRN severe pain    Admin Instructions: Based on patient request - if ordered for moderate or severe pain, provider allows for administration of a medication prescribed for a lower pain scale.  [NIKIA]    Do not exceed 4 grams of acetaminophen in a 24 hr period. Max dose of 2gm for AST/ALT greater than 120 units/L        If given for pain, use the following pain scale:   Mild Pain = Pain Score of 1-3, CPOT 1-2  Moderate Pain = Pain Score of 4-6, CPOT 3-4  Severe Pain = Pain Score of 7-10, CPOT 5-8     Route: Oral     Pneumococcal 20-Janneth Conj Vacc (PREVNAR-20) vaccine 0.5 mL 0.5 mL During Hospitalization 4/10/2024 -- Preventative, SNF admission requirement    Route: Intramuscular     polyethyl glycol-propyl glycol (SYSTANE) 0.4-0.3 % ophthalmic solution (artificial tears) 1 drop 1 drop Every 1 Hour PRN 5/15/2024 -- PRN dry eyes    Route: Both Eyes     Cosign for Ordering: Accepted by Cailin Acosta MD on 5/15/2024  2:47 PM     pregabalin (LYRICA) capsule 100 mg 100 mg 3 Times Daily 11/7/2023 -- Neuropathic pain    Admin Instructions: .     Route: Oral     saccharomyces boulardii (FLORASTOR) capsule 500 mg 500 mg 2 Times Daily 2/3/2024 -- Gut health, probiotic    Route: Oral     sertraline (ZOLOFT) tablet 50 mg 50 mg Nightly 2/18/2024 -- Anxiety disorder    Route: Oral     simethicone (MYLICON) chewable tablet 80 mg 80 mg 4 Times Daily PRN 11/6/2023 -- PRN flatulence    Admin Instructions: .     Route: Oral     sodium chloride 0.9 % flush 10 mL 10 mL As Needed 11/6/2023 -- Line care    Route: Intravenous     sodium chloride 0.9 % infusion 40 mL 40 mL As Needed 11/6/2023 -- Line care    Admin Instructions: Following administration of an IV intermittent medication, flush line with 40mL NS at 100mL/hr.     Route: Intravenous     vitamin B-12 (CYANOCOBALAMIN) tablet 1,000 mcg 1,000 mcg Daily 11/7/2023 --  Supplement, anemia    Admin Instructions: .     Route: Oral            Psychotropic Medications  Sertaline  Pregabalin    Potentially Clinically Significant Medication Issues/PharmD Recommendations:   Psychotropic Medication: Sertaline, pregabalin  Opioid Use Review: Oxycodone/APAP - approximately two to three doses daily administered on average.  No bowel regimen in place, but pt has had consistent bowel movements. Continue to monitor and consider addition of PRN bowel regimen.    Medication without an appropriate indication: n/a  Untreated indication: Pt has hx of GERD, evaluate if treatment indicated at this time.   Missing Duration: n/a  Excessive or Inadequate Dose: n/a  Ineffective Drug Therapy: n/a  Medication Adverse Reactions/Consequences: n/a  Medication Monitoring: n/a  Drug Interactions: Apixaban and ibuprofen -- monitor for increased bleed risk (pt also on diclofenac topical scheduled which has some systemic absorption).  Per [facility], concurrent use of diclofenac topical with systemic nonsteroidal anti-inflammatory drugs (NSAIDs) is generally not recommended as topical treatment is unlikely to provide added pain relief.Per evaluated  Duplicate Therapy: n/a  PTA Medication Omissions (not currently ordered): n/a  Safety: n/a    In completing this Drug Regimen Review for [facility] Hoffman, Dinorah Valdez, PharmD, have reviewed the electronic medical chart contents, including but not limited to the following: Medication Administration Records (MAR), Prescriber's orders, Progress, nursing and consultants' notes, Laboratory and diagnostic test results, Other sources of information about documented expressions or indications of distress and/or changes in condition.

## 2024-06-19 LAB
GLUCOSE BLDC GLUCOMTR-MCNC: 106 MG/DL (ref 70–99)
GLUCOSE BLDC GLUCOMTR-MCNC: 118 MG/DL (ref 70–99)
GLUCOSE BLDC GLUCOMTR-MCNC: 124 MG/DL (ref 70–99)
GLUCOSE BLDC GLUCOMTR-MCNC: 166 MG/DL (ref 70–99)

## 2024-06-19 PROCEDURE — 63710000001 INSULIN LISPRO (HUMAN) PER 5 UNITS: Performed by: PHYSICIAN ASSISTANT

## 2024-06-19 PROCEDURE — 63710000001 INSULIN GLARGINE PER 5 UNITS: Performed by: INTERNAL MEDICINE

## 2024-06-19 PROCEDURE — 82948 REAGENT STRIP/BLOOD GLUCOSE: CPT | Performed by: INTERNAL MEDICINE

## 2024-06-19 PROCEDURE — 63710000001 INSULIN GLARGINE PER 5 UNITS: Performed by: PHYSICIAN ASSISTANT

## 2024-06-19 PROCEDURE — 82948 REAGENT STRIP/BLOOD GLUCOSE: CPT

## 2024-06-19 RX ADMIN — INSULIN GLARGINE 35 UNITS: 100 INJECTION, SOLUTION SUBCUTANEOUS at 08:38

## 2024-06-19 RX ADMIN — FERROUS SULFATE TAB 325 MG (65 MG ELEMENTAL FE) 325 MG: 325 (65 FE) TAB at 08:39

## 2024-06-19 RX ADMIN — Medication 1 APPLICATION: at 10:57

## 2024-06-19 RX ADMIN — OXYCODONE AND ACETAMINOPHEN 1 TABLET: 10; 325 TABLET ORAL at 04:08

## 2024-06-19 RX ADMIN — ACETAMINOPHEN 650 MG: 325 TABLET ORAL at 01:34

## 2024-06-19 RX ADMIN — OXYCODONE AND ACETAMINOPHEN 1 TABLET: 10; 325 TABLET ORAL at 21:23

## 2024-06-19 RX ADMIN — Medication 500 MG: at 20:33

## 2024-06-19 RX ADMIN — Medication 500 MG: at 08:39

## 2024-06-19 RX ADMIN — DICLOFENAC SODIUM 4 G: 10 GEL TOPICAL at 15:07

## 2024-06-19 RX ADMIN — INSULIN LISPRO 3 UNITS: 100 INJECTION, SOLUTION INTRAVENOUS; SUBCUTANEOUS at 20:33

## 2024-06-19 RX ADMIN — BACLOFEN 10 MG: 10 TABLET ORAL at 21:23

## 2024-06-19 RX ADMIN — APIXABAN 5 MG: 5 TABLET, FILM COATED ORAL at 20:33

## 2024-06-19 RX ADMIN — MONTELUKAST 10 MG: 10 TABLET, FILM COATED ORAL at 20:33

## 2024-06-19 RX ADMIN — LISINOPRIL 2.5 MG: 2.5 TABLET ORAL at 08:39

## 2024-06-19 RX ADMIN — SERTRALINE HYDROCHLORIDE 50 MG: 50 TABLET ORAL at 20:33

## 2024-06-19 RX ADMIN — Medication 10 MG: at 20:33

## 2024-06-19 RX ADMIN — PREGABALIN 100 MG: 100 CAPSULE ORAL at 20:33

## 2024-06-19 RX ADMIN — EMPAGLIFLOZIN 10 MG: 10 TABLET, FILM COATED ORAL at 08:39

## 2024-06-19 RX ADMIN — BACLOFEN 10 MG: 10 TABLET ORAL at 13:10

## 2024-06-19 RX ADMIN — CETIRIZINE HYDROCHLORIDE 10 MG: 10 TABLET, FILM COATED ORAL at 08:39

## 2024-06-19 RX ADMIN — DICLOFENAC SODIUM 4 G: 10 GEL TOPICAL at 08:40

## 2024-06-19 RX ADMIN — PREGABALIN 100 MG: 100 CAPSULE ORAL at 16:32

## 2024-06-19 RX ADMIN — OXYCODONE AND ACETAMINOPHEN 1 TABLET: 10; 325 TABLET ORAL at 13:09

## 2024-06-19 RX ADMIN — DICLOFENAC SODIUM 4 G: 10 GEL TOPICAL at 02:22

## 2024-06-19 RX ADMIN — APIXABAN 5 MG: 5 TABLET, FILM COATED ORAL at 08:39

## 2024-06-19 RX ADMIN — FUROSEMIDE 40 MG: 40 TABLET ORAL at 08:39

## 2024-06-19 RX ADMIN — ACETAMINOPHEN 650 MG: 325 TABLET ORAL at 11:04

## 2024-06-19 RX ADMIN — CYANOCOBALAMIN TAB 500 MCG 1000 MCG: 500 TAB at 08:39

## 2024-06-19 RX ADMIN — DICLOFENAC SODIUM 4 G: 10 GEL TOPICAL at 20:36

## 2024-06-19 RX ADMIN — ATORVASTATIN CALCIUM 10 MG: 10 TABLET, FILM COATED ORAL at 20:33

## 2024-06-19 RX ADMIN — PREGABALIN 100 MG: 100 CAPSULE ORAL at 08:39

## 2024-06-19 RX ADMIN — IBUPROFEN 400 MG: 400 TABLET ORAL at 05:16

## 2024-06-19 RX ADMIN — BACLOFEN 10 MG: 10 TABLET ORAL at 04:08

## 2024-06-19 RX ADMIN — ACETAMINOPHEN 650 MG: 325 TABLET ORAL at 20:33

## 2024-06-19 RX ADMIN — INSULIN GLARGINE 22 UNITS: 100 INJECTION, SOLUTION SUBCUTANEOUS at 20:33

## 2024-06-19 NOTE — CONSULTS
"Nutrition Services    Patient Name: Jose Shaikh  YOB: 1960  MRN: [id number]  Admission date: 11/6/2023      CLINICAL NUTRITION ASSESSMENT      Reason for Assessment  Physician Consult and Medical Nutrition Therapy   H&P:  Past Medical History:   Diagnosis Date    Absence of toe of right foot     Acute osteomyelitis of left calcaneus  08/18/2021    Anxiety and depression     Arthritis     Cancer     Chronic pain     STATES HIS PAIN IS 10/10 AAT    Claustrophobia     Corns and callus     Diabetic ulcer of left heel associated with type 2 DM 08/18/2021    Diabetic ulcer of left heel associated with type 2 DM 07/06/2021    Diabetic ulcer of right midfoot associated with type 2 DM 08/18/2021    Difficulty walking     Essential hypertension 08/31/2021    Hammertoe     Hyperlipidemia LDL goal <100 08/31/2021    Ingrown toenail     Obesity     Paroxysmal atrial fibrillation 08/31/2021    Polyneuropathy     Pressure ulcer, stage 1     Tinea unguium     Type 2 diabetes mellitus with polyneuropathy         Current Problems:   Active Hospital Problems    Diagnosis     **Debility     Ulcerated, foot, left, limited to breakdown of skin     Onychomycosis     Ulcer of right foot         Nutrition/Diet History         Narrative   RD consulted per pt request to have plant-based burgers on Na-restricted diet. Pt is also on a high protein diet. RD visited pt and gathered typical meal intake. RD evaluated diet for Na+ content. Pt is not meeting 2000 mg restriction. RD to okay plant-based burgers, 2 per day limit. Pt v/u.     RD will continue to monitor per protocol.         Anthropometrics        Current Height, Weight Height: 188 cm (74.02\")  Weight:  (refused to get up to be weighed, RN notified)   Current BMI Body mass index is 42.84 kg/m².   BMI Classification Obese Class III   % %   Adjusted Body Weight (ABW) 112 kg   Weight Hx  Wt Readings from Last 30 Encounters:   05/31/24 1400 (!) 151 kg (333 lb 12.4 " oz)   05/22/24 1000 (!) 157 kg (346 lb 2 oz)   05/15/24 0545 (!) 153 kg (338 lb 3 oz)   05/08/24 1100 (!) 154 kg (340 lb 6.2 oz)   05/08/24 1029 (!) 154 kg (340 lb 6.2 oz)   05/01/24 1100 (!) 156 kg (343 lb 14.7 oz)   04/24/24 0900 (!) 159 kg (350 lb 1.5 oz)   04/17/24 1124 (!) 161 kg (355 lb 9.6 oz)   04/11/24 1509 (!) 162 kg (356 lb 11.3 oz)   04/02/24 1118 (!) 157 kg (345 lb 0.3 oz)   03/26/24 1003 (!) 143 kg (314 lb 2.5 oz)   03/18/24 1323 (!) 151 kg (332 lb 3.7 oz)   03/18/24 0500 (!) 171 kg (378 lb 1.4 oz)   03/17/24 0500 (!) 173 kg (380 lb 15.3 oz)   03/16/24 0500 (!) 171 kg (376 lb 15.8 oz)   03/15/24 0500 (!) 161 kg (354 lb 8 oz)   03/14/24 0300 (!) 173 kg (382 lb 4.4 oz)   03/12/24 0500 (!) 158 kg (347 lb 14.2 oz)   03/11/24 0500 (!) 153 kg (337 lb 8.4 oz)   03/10/24 0540 (!) 154 kg (339 lb 4.6 oz)   03/09/24 0500 (!) 153 kg (337 lb 1.3 oz)   03/08/24 1323 (!) 153 kg (337 lb 8 oz)   03/08/24 0500 (!) 157 kg (346 lb 2 oz)   03/06/24 2300 (!) 155 kg (342 lb 2.5 oz)   03/05/24 0415 (!) 155 kg (342 lb 13 oz)   03/04/24 0500 (!) 156 kg (344 lb 9.3 oz)   03/03/24 0500 (!) 156 kg (344 lb 9.3 oz)   03/02/24 0530 (!) 157 kg (346 lb 12.5 oz)   03/01/24 0500 (!) 157 kg (345 lb 3.9 oz)   02/29/24 0500 (!) 157 kg (347 lb 3.6 oz)   02/28/24 0509 (!) 158 kg (347 lb 14.2 oz)   02/27/24 0500 (!) 159 kg (351 lb 3.1 oz)   02/26/24 0500 (!) 159 kg (350 lb 12 oz)   02/25/24 0500 (!) 160 kg (351 lb 10.1 oz)   02/24/24 0500 (!) 160 kg (352 lb 1.2 oz)   02/23/24 0500 (!) 160 kg (353 lb 6.4 oz)   02/22/24 0500 (!) 160 kg (353 lb 13.4 oz)   02/21/24 0514 (!) 162 kg (356 lb 7.7 oz)   02/20/24 0500 (!) 161 kg (355 lb 2.6 oz)   02/19/24 0300 (!) 160 kg (353 lb 6.4 oz)   02/18/24 0500 (!) 162 kg (356 lb 7.7 oz)   02/17/24 0500 (!) 162 kg (357 lb 2.3 oz)   02/16/24 0500 (!) 162 kg (358 lb 0.4 oz)   02/15/24 0532 (!) 163 kg (360 lb 3.7 oz)   02/14/24 0600 (!) 162 kg (357 lb 5.9 oz)   02/13/24 0500 (!) 162 kg (357 lb 9.4 oz)   02/12/24  1352 (!) 160 kg (352 lb 4.7 oz)   02/12/24 0500 (!) 166 kg (367 lb 1.1 oz)   02/11/24 0500 (!) 169 kg (372 lb 2.2 oz)   02/10/24 0500 (!) 168 kg (370 lb 9.5 oz)   02/09/24 0600 (!) 168 kg (370 lb 9.5 oz)   02/08/24 0600 (!) 165 kg (363 lb 15.7 oz)   02/07/24 0600 (!) 166 kg (366 lb 10 oz)   02/06/24 0419 (!) 166 kg (366 lb 10 oz)   02/05/24 0500 (!) 167 kg (367 lb 4.6 oz)   02/04/24 0500 (!) 167 kg (367 lb 8.1 oz)   02/03/24 0500 (!) 166 kg (366 lb 6.5 oz)   02/02/24 0500 (!) 168 kg (369 lb 14.9 oz)   02/01/24 0613 (!) 169 kg (372 lb 5.7 oz)   01/31/24 0500 (!) 168 kg (371 lb 4.1 oz)   01/30/24 0500 (!) 169 kg (372 lb 12.8 oz)   01/29/24 0232 (!) 169 kg (372 lb 5.7 oz)   01/28/24 0500 (!) 167 kg (368 lb 6.2 oz)   01/26/24 0400 (!) 169 kg (372 lb 2.2 oz)   01/25/24 0417 (!) 167 kg (368 lb 9.8 oz)   01/24/24 0608 (!) 168 kg (371 lb 7.6 oz)   01/23/24 0500 (!) 168 kg (369 lb 14.9 oz)   01/22/24 0500 (!) 168 kg (371 lb 4.1 oz)   01/21/24 0500 (!) 168 kg (371 lb 4.1 oz)   01/20/24 0500 (!) 168 kg (371 lb 7.6 oz)   01/19/24 0500 (!) 171 kg (376 lb 1.7 oz)   01/18/24 0500 (!) 172 kg (380 lb 1.2 oz)   01/17/24 0614 (!) 172 kg (379 lb 6.6 oz)   01/16/24 0500 (!) 171 kg (378 lb 1.4 oz)   01/15/24 0500 (!) 169 kg (372 lb 2.2 oz)   01/14/24 0546 (!) 169 kg (373 lb 7.4 oz)   01/13/24 0507 (!) 171 kg (376 lb 5.2 oz)   01/12/24 0600 (!) 170 kg (374 lb 12.5 oz)   01/11/24 0600 (!) 169 kg (371 lb 14.7 oz)   01/10/24 0600 (!) 169 kg (371 lb 11.1 oz)   01/09/24 0500 (!) 168 kg (370 lb 9.5 oz)   01/08/24 0448 (!) 168 kg (370 lb 9.5 oz)   01/07/24 0454 (!) 167 kg (367 lb 15.2 oz)   01/06/24 0500 (!) 166 kg (366 lb 10 oz)   01/05/24 0555 (!) 165 kg (364 lb 6.7 oz)   01/04/24 0500 (!) 165 kg (363 lb 12.1 oz)   01/03/24 0500 (!) 166 kg (365 lb 1.3 oz)   01/02/24 0500 (!) 167 kg (367 lb 4.6 oz)   01/01/24 0500 (!) 166 kg (365 lb 11.9 oz)   12/31/23 0500 (!) 160 kg (352 lb 4.7 oz)   12/30/23 0500 (!) 167 kg (367 lb 15.2 oz)   12/29/23 0500  (!) 166 kg (366 lb 10 oz)   12/28/23 0500 (!) 165 kg (364 lb 13.8 oz)   12/27/23 0500 (!) 166 kg (367 lb 1.1 oz)   12/26/23 0500 (!) 167 kg (367 lb 4.6 oz)   12/25/23 0500 (!) 165 kg (364 lb 3.2 oz)   12/24/23 0500 (!) 164 kg (362 lb 7 oz)   12/23/23 0500 (!) 163 kg (360 lb 0.2 oz)   12/22/23 0600 (!) 163 kg (358 lb 14.5 oz)   12/21/23 0500 (!) 163 kg (359 lb 12.7 oz)   12/20/23 0500 (!) 162 kg (357 lb 9.4 oz)   12/19/23 0550 (!) 163 kg (359 lb 5.6 oz)   12/18/23 0500 (!) 161 kg (355 lb 13.2 oz)   12/17/23 0500 (!) 160 kg (353 lb 13.4 oz)   12/16/23 1541 (!) 160 kg (353 lb 3.2 oz)   12/16/23 0500 (!) 165 kg (364 lb 13.8 oz)   12/15/23 0500 (!) 165 kg (362 lb 10.5 oz)   12/14/23 0500 (!) 157 kg (345 lb 14.4 oz)   12/13/23 0500 (!) 153 kg (337 lb 4.9 oz)   12/12/23 0500 (!) 155 kg (341 lb 0.8 oz)   12/11/23 1049 (!) 155 kg (341 lb 0.8 oz)   12/11/23 0500 (!) 160 kg (353 lb 13.4 oz)   12/10/23 0532 (!) 162 kg (356 lb 7.7 oz)   12/09/23 0500 (!) 151 kg (332 lb 10.8 oz)   12/08/23 0500 (!) 153 kg (336 lb 13.8 oz)   12/06/23 0500 (!) 154 kg (340 lb 6.2 oz)   12/05/23 0607 (!) 161 kg (354 lb 15.1 oz)   12/04/23 0500 (!) 161 kg (354 lb 4.5 oz)   12/03/23 0400 (!) 161 kg (354 lb 11.5 oz)   11/21/23 1155 (!) 162 kg (357 lb 12.8 oz)   11/09/23 0500 (!) 160 kg (353 lb 9.9 oz)   11/06/23 1422 (!) 158 kg (348 lb 5.2 oz)   11/02/23 1155 (!) 158 kg (348 lb 15.8 oz)   09/11/23 0030 (!) 151 kg (334 lb)   09/10/23 1844 (!) 154 kg (338 lb 10 oz)   08/30/23 1112 (!) 157 kg (347 lb)   08/01/23 0932 (!) 158 kg (347 lb 10.7 oz)   07/31/23 2347 (!) 163 kg (358 lb 7.5 oz)   06/16/23 2333 (!) 155 kg (342 lb 2.5 oz)   06/16/23 1053 (!) 155 kg (342 lb 3.2 oz)   06/15/23 0505 (!) 149 kg (328 lb 14.4 oz)   06/14/23 0455 (!) 149 kg (328 lb 4.8 oz)   06/13/23 1703 (!) 149 kg (328 lb 11.2 oz)   06/13/23 0600 (!) 170 kg (374 lb 12.5 oz)   06/12/23 0420 (!) 160 kg (352 lb 11.8 oz)   06/11/23 0447 (!) 150 kg (331 lb 5.6 oz)   06/10/23 0500 (!) 150 kg  (330 lb 14.6 oz)   06/09/23 0536 (!) 156 kg (343 lb 7.6 oz)   06/08/23 1826 (!) 156 kg (344 lb 11.2 oz)   06/08/23 0522 (!) 158 kg (348 lb 8.8 oz)   06/07/23 0548 (!) 155 kg (342 lb 9.5 oz)   06/06/23 0515 (!) 155 kg (341 lb 14.9 oz)   06/05/23 0445 (!) 155 kg (341 lb 9.6 oz)   06/05/23 0348 (!) 158 kg (349 lb 3.3 oz)   06/04/23 0555 (!) 157 kg (346 lb 3.2 oz)   06/03/23 0539 (!) 158 kg (349 lb)   06/02/23 0548 (!) 163 kg (359 lb 3.2 oz)   06/01/23 0600 (!) 164 kg (362 lb)   05/31/23 0507 (!) 164 kg (362 lb 4.8 oz)   05/30/23 0635 (!) 164 kg (362 lb 7 oz)   05/29/23 0500 (!) 167 kg (368 lb 2.7 oz)   05/28/23 0600 (!) 167 kg (367 lb 11.6 oz)   05/27/23 0600 (!) 167 kg (369 lb 0.8 oz)   05/26/23 0529 (!) 167 kg (369 lb 3.2 oz)   05/25/23 0600 (!) 168 kg (370 lb 3.2 oz)   05/24/23 0600 (!) 166 kg (366 lb 9.6 oz)   05/22/23 0529 (!) 169 kg (373 lb 7.4 oz)   05/21/23 0600 (!) 169 kg (371 lb 12.8 oz)   05/20/23 0600 (!) 171 kg (376 lb 5.2 oz)   05/19/23 0300 (!) 168 kg (370 lb 14.4 oz)   05/18/23 1912 (!) 168 kg (371 lb 7.6 oz)   05/18/23 0600 (!) 169 kg (371 lb 11.1 oz)   05/16/23 0700 (!) 171 kg (377 lb 4.8 oz)   05/14/23 0500 (!) 171 kg (377 lb 3.3 oz)   05/12/23 1143 (!) 170 kg (375 lb)   05/06/23 0258 (!) 170 kg (375 lb 8 oz)   04/19/23 0909 (!) 163 kg (359 lb)   04/03/23 1906 (!) 168 kg (370 lb)   03/27/23 0938 (!) 170 kg (373 lb 10.9 oz)   03/17/23 1153 (!) 168 kg (370 lb)   01/27/23 1501 (!) 168 kg (370 lb)   12/22/22 1501 (!) 171 kg (376 lb)   11/08/22 1035 (!) 161 kg (355 lb)   10/01/22 1141 (!) 164 kg (360 lb 10.8 oz)   05/18/22 1311 (!) 155 kg (341 lb)   03/24/22 1432 (!) 155 kg (341 lb)   03/02/22 1412 (!) 155 kg (341 lb)   01/12/22 1317 (!) 155 kg (341 lb)   12/30/21 1431 (!) 155 kg (341 lb)   12/01/21 1144 (!) 155 kg (341 lb 11.4 oz)   12/01/21 0843 (!) 157 kg (346 lb)   11/22/21 0839 (!) 157 kg (346 lb)   11/15/21 1148 (!) 157 kg (346 lb 12.5 oz)   11/09/21 1139 (!) 157 kg (345 lb 7.4 oz)   11/05/21 1130  (!) 159 kg (351 lb 3.1 oz)   11/02/21 1121 (!) 161 kg (356 lb)   10/27/21 1048 (!) 161 kg (356 lb)   10/21/21 1418 (!) 162 kg (356 lb 14.8 oz)          Wt Change Observation Wt loss on Ozempic     Estimated/Assessed Needs  Estimated Needs based on: Adjusted Body Weight       Energy Requirements 25 kcal/kg    EST Needs (kcal/day) 2600       Protein Requirements 1.0-1.2 g/kg   EST Daily Needs (g/day) 104-125       Fluid Requirements 25 ml/kg    Estimated Needs (mL/day) 2600     Labs/Medications         Pertinent Labs Reviewed.   Results from last 7 days   Lab Units 06/17/24  1845   SODIUM mmol/L 137   POTASSIUM mmol/L 4.1   CHLORIDE mmol/L 106   CO2 mmol/L 24.8   BUN mg/dL 16   CREATININE mg/dL 0.46*   CALCIUM mg/dL 8.0*   GLUCOSE mg/dL 90     Results from last 7 days   Lab Units 06/17/24  1845   HEMOGLOBIN g/dL 11.0*   HEMATOCRIT % 34.2*       COVID19   Date Value Ref Range Status   03/11/2024 Not Detected Not Detected - Ref. Range Final     Lab Results   Component Value Date    HGBA1C 5.00 05/06/2024         Pertinent Medications Reviewed.     Malnutrition Severity Assessment              Nutrition Diagnosis         Nutrition Dx Problem 1 Increased nutrient needs related to increased protein demand as evidenced by impaired skin integrity.     Nutrition Intervention           Current Nutrition Orders & Evaluation of Intake       Current PO Diet Diet: Diabetic Diets, High Protein Diet, Cardiac; Low Sodium (2g); Texture: Regular Texture (IDDSI 7); Fluid Consistency: Thin (IDDSI 0)   Supplement No active supplement orders           Nutrition Intervention/Prescription        High protein diet   Plant-based burger okay. 2 per day = 560 mg Na+        Medical Nutrition Therapy/Nutrition Education          Learner     Readiness Patient  Acceptance     Method     Response Explanation  Verbalizes understanding     Monitor/Evaluation        Monitor PO intake, Weight, Skin status     Nutrition Discharge Plan         Continue  high protein diet upon discharge      Electronically signed by:  Tiff Orellana RD  06/19/24 09:48 EDT

## 2024-06-19 NOTE — PLAN OF CARE
Goal Outcome Evaluation:  Plan of Care Reviewed With: patient        Progress: improving  Outcome Evaluation: Patient medicated for pain with prn pain medication and muscle relaxer. Blood sugars wnl. Weekly injection of Ozympic given per orders. New Rx delivered and identified by pharmacy, in refrigerator for further dosing.

## 2024-06-20 LAB
GLUCOSE BLDC GLUCOMTR-MCNC: 103 MG/DL (ref 70–99)
GLUCOSE BLDC GLUCOMTR-MCNC: 103 MG/DL (ref 70–99)
GLUCOSE BLDC GLUCOMTR-MCNC: 109 MG/DL (ref 70–99)
GLUCOSE BLDC GLUCOMTR-MCNC: 119 MG/DL (ref 70–99)

## 2024-06-20 PROCEDURE — 63710000001 INSULIN GLARGINE PER 5 UNITS: Performed by: INTERNAL MEDICINE

## 2024-06-20 PROCEDURE — 63710000001 ONDANSETRON ODT 4 MG TABLET DISPERSIBLE: Performed by: INTERNAL MEDICINE

## 2024-06-20 PROCEDURE — 82948 REAGENT STRIP/BLOOD GLUCOSE: CPT | Performed by: INTERNAL MEDICINE

## 2024-06-20 PROCEDURE — 63710000001 INSULIN GLARGINE PER 5 UNITS: Performed by: PHYSICIAN ASSISTANT

## 2024-06-20 PROCEDURE — 82948 REAGENT STRIP/BLOOD GLUCOSE: CPT

## 2024-06-20 RX ADMIN — ACETAMINOPHEN 650 MG: 325 TABLET ORAL at 09:41

## 2024-06-20 RX ADMIN — Medication 10 MG: at 20:14

## 2024-06-20 RX ADMIN — DICLOFENAC SODIUM 4 G: 10 GEL TOPICAL at 20:15

## 2024-06-20 RX ADMIN — Medication 1 APPLICATION: at 09:41

## 2024-06-20 RX ADMIN — IBUPROFEN 400 MG: 400 TABLET ORAL at 04:49

## 2024-06-20 RX ADMIN — CETIRIZINE HYDROCHLORIDE 10 MG: 10 TABLET, FILM COATED ORAL at 09:41

## 2024-06-20 RX ADMIN — ACETAMINOPHEN 650 MG: 325 TABLET ORAL at 02:22

## 2024-06-20 RX ADMIN — BACLOFEN 10 MG: 10 TABLET ORAL at 05:49

## 2024-06-20 RX ADMIN — ONDANSETRON 4 MG: 4 TABLET, ORALLY DISINTEGRATING ORAL at 16:57

## 2024-06-20 RX ADMIN — EMPAGLIFLOZIN 10 MG: 10 TABLET, FILM COATED ORAL at 09:41

## 2024-06-20 RX ADMIN — SERTRALINE HYDROCHLORIDE 50 MG: 50 TABLET ORAL at 20:14

## 2024-06-20 RX ADMIN — OXYCODONE AND ACETAMINOPHEN 1 TABLET: 10; 325 TABLET ORAL at 05:49

## 2024-06-20 RX ADMIN — PREGABALIN 100 MG: 100 CAPSULE ORAL at 16:57

## 2024-06-20 RX ADMIN — LISINOPRIL 2.5 MG: 2.5 TABLET ORAL at 09:40

## 2024-06-20 RX ADMIN — CYANOCOBALAMIN TAB 500 MCG 1000 MCG: 500 TAB at 09:41

## 2024-06-20 RX ADMIN — PREGABALIN 100 MG: 100 CAPSULE ORAL at 09:41

## 2024-06-20 RX ADMIN — DICLOFENAC SODIUM 4 G: 10 GEL TOPICAL at 02:23

## 2024-06-20 RX ADMIN — ATORVASTATIN CALCIUM 10 MG: 10 TABLET, FILM COATED ORAL at 20:14

## 2024-06-20 RX ADMIN — BACLOFEN 10 MG: 10 TABLET ORAL at 22:25

## 2024-06-20 RX ADMIN — FUROSEMIDE 40 MG: 40 TABLET ORAL at 09:41

## 2024-06-20 RX ADMIN — OXYCODONE AND ACETAMINOPHEN 1 TABLET: 10; 325 TABLET ORAL at 14:15

## 2024-06-20 RX ADMIN — MONTELUKAST 10 MG: 10 TABLET, FILM COATED ORAL at 20:14

## 2024-06-20 RX ADMIN — FERROUS SULFATE TAB 325 MG (65 MG ELEMENTAL FE) 325 MG: 325 (65 FE) TAB at 09:41

## 2024-06-20 RX ADMIN — Medication 500 MG: at 20:14

## 2024-06-20 RX ADMIN — PREGABALIN 100 MG: 100 CAPSULE ORAL at 20:14

## 2024-06-20 RX ADMIN — OXYCODONE AND ACETAMINOPHEN 1 TABLET: 10; 325 TABLET ORAL at 22:25

## 2024-06-20 RX ADMIN — APIXABAN 5 MG: 5 TABLET, FILM COATED ORAL at 20:14

## 2024-06-20 RX ADMIN — Medication 500 MG: at 09:40

## 2024-06-20 RX ADMIN — INSULIN GLARGINE 22 UNITS: 100 INJECTION, SOLUTION SUBCUTANEOUS at 20:13

## 2024-06-20 RX ADMIN — DICLOFENAC SODIUM 4 G: 10 GEL TOPICAL at 09:46

## 2024-06-20 RX ADMIN — DICLOFENAC SODIUM 4 G: 10 GEL TOPICAL at 14:16

## 2024-06-20 RX ADMIN — INSULIN GLARGINE 35 UNITS: 100 INJECTION, SOLUTION SUBCUTANEOUS at 09:40

## 2024-06-20 RX ADMIN — BACLOFEN 10 MG: 10 TABLET ORAL at 14:15

## 2024-06-20 RX ADMIN — APIXABAN 5 MG: 5 TABLET, FILM COATED ORAL at 09:41

## 2024-06-20 NOTE — PLAN OF CARE
Goal Outcome Evaluation:           Progress: no change  Outcome Evaluation: Rsd. is alert and oriented, able to make needs known to staff.  Rsd. has rested well this shift in between care.  Medicated x1 with prn Tylenol, motrin, baclofen and oxycodone, see emar.  Rsd.  States medications effective.  Rsd. also recieving scheduled voltaren gel to L) shoulder and L) elbow.  Rsd. tolerating well, states effective.  Call light and personal items within reach, Rsd. reminded to use call light for assistance, verbalizes understanding.  Rsd. refuses turns.  Weight shifts with trapeze bar.  Skin care completed to Stanley. lower legs this shift.  Aveeno lotion applied.  Rsd. refuses to transfer or get out of bed this shift.  X1-2 assist to turn and place on bedpan.  Activity encouraged.  WIll continue to monitor and notify on-coming staff.  Current plan of care remains in place this shift.                               Problem: Adult Inpatient Plan of Care  Goal: Plan of Care Review  Outcome: Unable to Meet, Plan Revised  Flowsheets  Taken 6/20/2024 0605 by Patsy Cano, RN  Progress: no change  Taken 6/19/2024 1835 by Anaid Anna, RN  Plan of Care Reviewed With: patient  Goal: Patient-Specific Goal (Individualized)  Outcome: Unable to Meet, Plan Revised  Goal: Absence of Hospital-Acquired Illness or Injury  Outcome: Unable to Meet, Plan Revised  Goal: Optimal Comfort and Wellbeing  Outcome: Unable to Meet, Plan Revised  Goal: Readiness for Transition of Care  Outcome: Unable to Meet, Plan Revised     Problem: Fall Injury Risk  Goal: Absence of Fall and Fall-Related Injury  Outcome: Unable to Meet, Plan Revised     Problem: Skin Injury Risk Increased  Goal: Skin Health and Integrity  Outcome: Unable to Meet, Plan Revised     Problem: Diabetes Comorbidity  Goal: Blood Glucose Level Within Targeted Range  Outcome: Unable to Meet, Plan Revised     Problem: Hypertension Comorbidity  Goal: Blood Pressure in Desired  Range  Outcome: Unable to Meet, Plan Revised     Problem: Impaired Wound Healing  Goal: Optimal Wound Healing  Outcome: Unable to Meet, Plan Revised     Problem: Skin Injury Risk Increased  Goal: Skin Health and Integrity  Outcome: Unable to Meet, Plan Revised     Problem: Fall Injury Risk  Goal: Absence of Fall and Fall-Related Injury  Outcome: Unable to Meet, Plan Revised     Problem: Pain Chronic (Persistent)  Goal: Acceptable Pain Control and Functional Ability  Outcome: Unable to Meet, Plan Revised

## 2024-06-20 NOTE — PLAN OF CARE
Goal Outcome Evaluation:   No changes this shift. Patient attempted to get OOB this AM without success. Education regarding pressure ulcer prevention provided. Pt refuses turns. Pain controlled with PRN medications. Blood glucose levels have remained WNL this shift.

## 2024-06-21 LAB
GLUCOSE BLDC GLUCOMTR-MCNC: 103 MG/DL (ref 70–99)
GLUCOSE BLDC GLUCOMTR-MCNC: 111 MG/DL (ref 70–99)
GLUCOSE BLDC GLUCOMTR-MCNC: 140 MG/DL (ref 70–99)
GLUCOSE BLDC GLUCOMTR-MCNC: 148 MG/DL (ref 70–99)

## 2024-06-21 PROCEDURE — 63710000001 ONDANSETRON ODT 4 MG TABLET DISPERSIBLE: Performed by: INTERNAL MEDICINE

## 2024-06-21 PROCEDURE — 82948 REAGENT STRIP/BLOOD GLUCOSE: CPT | Performed by: INTERNAL MEDICINE

## 2024-06-21 PROCEDURE — 63710000001 INSULIN GLARGINE PER 5 UNITS: Performed by: PHYSICIAN ASSISTANT

## 2024-06-21 PROCEDURE — 63710000001 INSULIN GLARGINE PER 5 UNITS: Performed by: INTERNAL MEDICINE

## 2024-06-21 PROCEDURE — 82948 REAGENT STRIP/BLOOD GLUCOSE: CPT

## 2024-06-21 RX ADMIN — DICLOFENAC SODIUM 4 G: 10 GEL TOPICAL at 15:00

## 2024-06-21 RX ADMIN — APIXABAN 5 MG: 5 TABLET, FILM COATED ORAL at 10:20

## 2024-06-21 RX ADMIN — EMPAGLIFLOZIN 10 MG: 10 TABLET, FILM COATED ORAL at 10:19

## 2024-06-21 RX ADMIN — Medication 500 MG: at 10:19

## 2024-06-21 RX ADMIN — FERROUS SULFATE TAB 325 MG (65 MG ELEMENTAL FE) 325 MG: 325 (65 FE) TAB at 10:19

## 2024-06-21 RX ADMIN — MONTELUKAST 10 MG: 10 TABLET, FILM COATED ORAL at 20:16

## 2024-06-21 RX ADMIN — PREGABALIN 100 MG: 100 CAPSULE ORAL at 20:16

## 2024-06-21 RX ADMIN — APIXABAN 5 MG: 5 TABLET, FILM COATED ORAL at 20:16

## 2024-06-21 RX ADMIN — PREGABALIN 100 MG: 100 CAPSULE ORAL at 10:20

## 2024-06-21 RX ADMIN — BACLOFEN 10 MG: 10 TABLET ORAL at 06:39

## 2024-06-21 RX ADMIN — Medication 10 MG: at 20:16

## 2024-06-21 RX ADMIN — CYANOCOBALAMIN TAB 500 MCG 1000 MCG: 500 TAB at 10:20

## 2024-06-21 RX ADMIN — DICLOFENAC SODIUM 4 G: 10 GEL TOPICAL at 02:21

## 2024-06-21 RX ADMIN — OXYCODONE AND ACETAMINOPHEN 1 TABLET: 10; 325 TABLET ORAL at 06:39

## 2024-06-21 RX ADMIN — ONDANSETRON 4 MG: 4 TABLET, ORALLY DISINTEGRATING ORAL at 12:13

## 2024-06-21 RX ADMIN — ACETAMINOPHEN 650 MG: 325 TABLET ORAL at 12:07

## 2024-06-21 RX ADMIN — Medication 500 MG: at 20:16

## 2024-06-21 RX ADMIN — ATORVASTATIN CALCIUM 10 MG: 10 TABLET, FILM COATED ORAL at 20:16

## 2024-06-21 RX ADMIN — DICLOFENAC SODIUM 4 G: 10 GEL TOPICAL at 07:45

## 2024-06-21 RX ADMIN — INSULIN GLARGINE 35 UNITS: 100 INJECTION, SOLUTION SUBCUTANEOUS at 10:21

## 2024-06-21 RX ADMIN — OXYCODONE AND ACETAMINOPHEN 1 TABLET: 10; 325 TABLET ORAL at 14:48

## 2024-06-21 RX ADMIN — OXYCODONE AND ACETAMINOPHEN 1 TABLET: 10; 325 TABLET ORAL at 22:52

## 2024-06-21 RX ADMIN — DICLOFENAC SODIUM 4 G: 10 GEL TOPICAL at 20:17

## 2024-06-21 RX ADMIN — FUROSEMIDE 40 MG: 40 TABLET ORAL at 10:20

## 2024-06-21 RX ADMIN — SERTRALINE HYDROCHLORIDE 50 MG: 50 TABLET ORAL at 20:16

## 2024-06-21 RX ADMIN — BACLOFEN 10 MG: 10 TABLET ORAL at 22:52

## 2024-06-21 RX ADMIN — INSULIN GLARGINE 22 UNITS: 100 INJECTION, SOLUTION SUBCUTANEOUS at 20:16

## 2024-06-21 RX ADMIN — Medication 1 APPLICATION: at 10:21

## 2024-06-21 RX ADMIN — LISINOPRIL 2.5 MG: 2.5 TABLET ORAL at 10:19

## 2024-06-21 RX ADMIN — CETIRIZINE HYDROCHLORIDE 10 MG: 10 TABLET, FILM COATED ORAL at 10:20

## 2024-06-21 RX ADMIN — ACETAMINOPHEN 650 MG: 325 TABLET ORAL at 04:21

## 2024-06-21 RX ADMIN — BACLOFEN 10 MG: 10 TABLET ORAL at 14:48

## 2024-06-21 RX ADMIN — PREGABALIN 100 MG: 100 CAPSULE ORAL at 16:05

## 2024-06-21 NOTE — PLAN OF CARE
Goal Outcome Evaluation:  Plan of Care Reviewed With: patient        Progress: no change  No major changes noted this shift. Patient is alert and oriented x4. Con't to refuse turns and refused bath offered by both CNA and RN. Given PRN Tylenol at 1207 for break through left hip pain. Med somewhat effective. Given PRN Zofran ODT at 1213 for c/o nausea without emesis. Med effective. Given Percocet and Baclofen at 1448 for severe left hip pain. Med effective per patient at reducing pain but stated nothing takes it completely away. Voices needs.

## 2024-06-21 NOTE — PLAN OF CARE
Goal Outcome Evaluation:  Plan of Care Reviewed With: patient      Alert and oriented x4; able to call for assist as needed. Uses trapeze bar to reposition and shift weight but refuses to turn Q 2hrs. Uses urinal to void independently. Refused skin care this shift. Medicated with Percocet, Baclofen, and Tylenol for left hip pain with relief; see MAR for times. Blood sugar 103 at bedtime; scheduled Lantus administered per MAR. VSS. Call light within reach; care plan ongoing.

## 2024-06-22 LAB
GLUCOSE BLDC GLUCOMTR-MCNC: 111 MG/DL (ref 70–99)
GLUCOSE BLDC GLUCOMTR-MCNC: 115 MG/DL (ref 70–99)
GLUCOSE BLDC GLUCOMTR-MCNC: 123 MG/DL (ref 70–99)
GLUCOSE BLDC GLUCOMTR-MCNC: 166 MG/DL (ref 70–99)

## 2024-06-22 PROCEDURE — 63710000001 INSULIN GLARGINE PER 5 UNITS: Performed by: PHYSICIAN ASSISTANT

## 2024-06-22 PROCEDURE — 82948 REAGENT STRIP/BLOOD GLUCOSE: CPT

## 2024-06-22 PROCEDURE — 63710000001 INSULIN LISPRO (HUMAN) PER 5 UNITS: Performed by: PHYSICIAN ASSISTANT

## 2024-06-22 PROCEDURE — 63710000001 INSULIN GLARGINE PER 5 UNITS: Performed by: INTERNAL MEDICINE

## 2024-06-22 PROCEDURE — 82948 REAGENT STRIP/BLOOD GLUCOSE: CPT | Performed by: INTERNAL MEDICINE

## 2024-06-22 RX ADMIN — Medication 10 MG: at 21:39

## 2024-06-22 RX ADMIN — Medication 500 MG: at 21:39

## 2024-06-22 RX ADMIN — BACLOFEN 10 MG: 10 TABLET ORAL at 16:59

## 2024-06-22 RX ADMIN — APIXABAN 5 MG: 5 TABLET, FILM COATED ORAL at 21:39

## 2024-06-22 RX ADMIN — FUROSEMIDE 40 MG: 40 TABLET ORAL at 08:35

## 2024-06-22 RX ADMIN — DICLOFENAC SODIUM 4 G: 10 GEL TOPICAL at 21:40

## 2024-06-22 RX ADMIN — DICLOFENAC SODIUM 4 G: 10 GEL TOPICAL at 08:34

## 2024-06-22 RX ADMIN — PREGABALIN 100 MG: 100 CAPSULE ORAL at 16:59

## 2024-06-22 RX ADMIN — APIXABAN 5 MG: 5 TABLET, FILM COATED ORAL at 08:35

## 2024-06-22 RX ADMIN — ATORVASTATIN CALCIUM 10 MG: 10 TABLET, FILM COATED ORAL at 21:39

## 2024-06-22 RX ADMIN — OXYCODONE AND ACETAMINOPHEN 1 TABLET: 10; 325 TABLET ORAL at 16:59

## 2024-06-22 RX ADMIN — PREGABALIN 100 MG: 100 CAPSULE ORAL at 21:39

## 2024-06-22 RX ADMIN — EMPAGLIFLOZIN 10 MG: 10 TABLET, FILM COATED ORAL at 08:35

## 2024-06-22 RX ADMIN — LISINOPRIL 2.5 MG: 2.5 TABLET ORAL at 08:35

## 2024-06-22 RX ADMIN — FERROUS SULFATE TAB 325 MG (65 MG ELEMENTAL FE) 325 MG: 325 (65 FE) TAB at 08:35

## 2024-06-22 RX ADMIN — MONTELUKAST 10 MG: 10 TABLET, FILM COATED ORAL at 21:40

## 2024-06-22 RX ADMIN — BACLOFEN 10 MG: 10 TABLET ORAL at 07:19

## 2024-06-22 RX ADMIN — INSULIN GLARGINE 22 UNITS: 100 INJECTION, SOLUTION SUBCUTANEOUS at 21:40

## 2024-06-22 RX ADMIN — Medication 500 MG: at 08:35

## 2024-06-22 RX ADMIN — PREGABALIN 100 MG: 100 CAPSULE ORAL at 08:34

## 2024-06-22 RX ADMIN — INSULIN GLARGINE 35 UNITS: 100 INJECTION, SOLUTION SUBCUTANEOUS at 08:34

## 2024-06-22 RX ADMIN — INSULIN LISPRO 3 UNITS: 100 INJECTION, SOLUTION INTRAVENOUS; SUBCUTANEOUS at 21:40

## 2024-06-22 RX ADMIN — DICLOFENAC SODIUM 4 G: 10 GEL TOPICAL at 02:55

## 2024-06-22 RX ADMIN — OXYCODONE AND ACETAMINOPHEN 1 TABLET: 10; 325 TABLET ORAL at 07:19

## 2024-06-22 RX ADMIN — CETIRIZINE HYDROCHLORIDE 10 MG: 10 TABLET, FILM COATED ORAL at 08:35

## 2024-06-22 RX ADMIN — CYANOCOBALAMIN TAB 500 MCG 1000 MCG: 500 TAB at 08:35

## 2024-06-22 RX ADMIN — SERTRALINE HYDROCHLORIDE 50 MG: 50 TABLET ORAL at 21:39

## 2024-06-22 NOTE — PLAN OF CARE
Goal Outcome Evaluation:  Plan of Care Reviewed With: patient           Outcome Evaluation: PT is AAOx4, VSS, remains on SSI, BG WNL, no Humalog needed this shift, only Lantus given this a.m. as ordered, refused BLE skin care, utilizes trapeze bar to shift weight and assist with bedpan, refuses to get OOB to ambulate or use BSC, education and encouragement given to do so, frequently seeks out staff and PRN medications, patient does not attempt to be as independent as possible, no c/o of SOA, N/V/D, uses urinal, intermittently incontinent of bowel, last BM noted today, no acute events this shift, continue with current POC at this time.

## 2024-06-22 NOTE — PLAN OF CARE
Goal Outcome Evaluation:  Plan of Care Reviewed With: patient        Progress: no change  Outcome Evaluation: Alert and oriented x4; able to make needs known to staff. Continues to refuse turns and skin care ordered. Uses trapeze bar to shift weight and assist with bedpan. Utilizes urinal independently; has not been OOB. Given Baclofen and Percocet for pain with relief; see MAR for administration times. Blood sugar 148 at bedtime; scheduled Lantus given. Call light within reach; care plan ongoing.

## 2024-06-23 LAB
GLUCOSE BLDC GLUCOMTR-MCNC: 119 MG/DL (ref 70–99)
GLUCOSE BLDC GLUCOMTR-MCNC: 123 MG/DL (ref 70–99)
GLUCOSE BLDC GLUCOMTR-MCNC: 173 MG/DL (ref 70–99)
GLUCOSE BLDC GLUCOMTR-MCNC: 91 MG/DL (ref 70–99)

## 2024-06-23 PROCEDURE — 63710000001 ONDANSETRON ODT 4 MG TABLET DISPERSIBLE: Performed by: INTERNAL MEDICINE

## 2024-06-23 PROCEDURE — 63710000001 INSULIN LISPRO (HUMAN) PER 5 UNITS: Performed by: PHYSICIAN ASSISTANT

## 2024-06-23 PROCEDURE — 82948 REAGENT STRIP/BLOOD GLUCOSE: CPT

## 2024-06-23 PROCEDURE — 82948 REAGENT STRIP/BLOOD GLUCOSE: CPT | Performed by: INTERNAL MEDICINE

## 2024-06-23 PROCEDURE — 63710000001 INSULIN GLARGINE PER 5 UNITS: Performed by: PHYSICIAN ASSISTANT

## 2024-06-23 RX ADMIN — BACLOFEN 10 MG: 10 TABLET ORAL at 00:36

## 2024-06-23 RX ADMIN — IBUPROFEN 400 MG: 400 TABLET ORAL at 04:14

## 2024-06-23 RX ADMIN — Medication 1 APPLICATION: at 23:48

## 2024-06-23 RX ADMIN — ATORVASTATIN CALCIUM 10 MG: 10 TABLET, FILM COATED ORAL at 20:18

## 2024-06-23 RX ADMIN — ACETAMINOPHEN 650 MG: 325 TABLET ORAL at 20:18

## 2024-06-23 RX ADMIN — LISINOPRIL 2.5 MG: 2.5 TABLET ORAL at 08:49

## 2024-06-23 RX ADMIN — Medication 10 MG: at 20:18

## 2024-06-23 RX ADMIN — INSULIN GLARGINE 22 UNITS: 100 INJECTION, SOLUTION SUBCUTANEOUS at 20:18

## 2024-06-23 RX ADMIN — BACLOFEN 10 MG: 10 TABLET ORAL at 17:26

## 2024-06-23 RX ADMIN — MONTELUKAST 10 MG: 10 TABLET, FILM COATED ORAL at 20:18

## 2024-06-23 RX ADMIN — PREGABALIN 100 MG: 100 CAPSULE ORAL at 20:18

## 2024-06-23 RX ADMIN — SERTRALINE HYDROCHLORIDE 50 MG: 50 TABLET ORAL at 20:18

## 2024-06-23 RX ADMIN — PREGABALIN 100 MG: 100 CAPSULE ORAL at 17:26

## 2024-06-23 RX ADMIN — FERROUS SULFATE TAB 325 MG (65 MG ELEMENTAL FE) 325 MG: 325 (65 FE) TAB at 08:49

## 2024-06-23 RX ADMIN — OXYCODONE AND ACETAMINOPHEN 1 TABLET: 10; 325 TABLET ORAL at 00:36

## 2024-06-23 RX ADMIN — Medication 500 MG: at 20:18

## 2024-06-23 RX ADMIN — APIXABAN 5 MG: 5 TABLET, FILM COATED ORAL at 20:18

## 2024-06-23 RX ADMIN — DICLOFENAC SODIUM 4 G: 10 GEL TOPICAL at 20:19

## 2024-06-23 RX ADMIN — DICLOFENAC SODIUM 4 G: 10 GEL TOPICAL at 02:30

## 2024-06-23 RX ADMIN — Medication 500 MG: at 08:48

## 2024-06-23 RX ADMIN — EMPAGLIFLOZIN 10 MG: 10 TABLET, FILM COATED ORAL at 08:49

## 2024-06-23 RX ADMIN — APIXABAN 5 MG: 5 TABLET, FILM COATED ORAL at 08:49

## 2024-06-23 RX ADMIN — DICLOFENAC SODIUM 4 G: 10 GEL TOPICAL at 08:49

## 2024-06-23 RX ADMIN — CETIRIZINE HYDROCHLORIDE 10 MG: 10 TABLET, FILM COATED ORAL at 08:49

## 2024-06-23 RX ADMIN — PREGABALIN 100 MG: 100 CAPSULE ORAL at 08:49

## 2024-06-23 RX ADMIN — OXYCODONE AND ACETAMINOPHEN 1 TABLET: 10; 325 TABLET ORAL at 17:26

## 2024-06-23 RX ADMIN — CYANOCOBALAMIN TAB 500 MCG 1000 MCG: 500 TAB at 08:49

## 2024-06-23 RX ADMIN — FUROSEMIDE 40 MG: 40 TABLET ORAL at 08:49

## 2024-06-23 RX ADMIN — INSULIN LISPRO 3 UNITS: 100 INJECTION, SOLUTION INTRAVENOUS; SUBCUTANEOUS at 20:18

## 2024-06-23 RX ADMIN — ONDANSETRON 4 MG: 4 TABLET, ORALLY DISINTEGRATING ORAL at 23:54

## 2024-06-23 RX ADMIN — BACLOFEN 10 MG: 10 TABLET ORAL at 08:49

## 2024-06-23 RX ADMIN — IBUPROFEN 400 MG: 400 TABLET ORAL at 23:54

## 2024-06-23 RX ADMIN — OXYCODONE AND ACETAMINOPHEN 1 TABLET: 10; 325 TABLET ORAL at 08:48

## 2024-06-23 NOTE — PLAN OF CARE
Goal Outcome Evaluation:              Outcome Evaluation: PT remains AAOx4, VSS. Utilizes trapeze bar to shift weight and assist with bedpan anf turning, refuses to get OOB to ambulate or use BSC, education and encouragement given to do so. PRN medications given per request.

## 2024-06-23 NOTE — PLAN OF CARE
Goal Outcome Evaluation:  Plan of Care Reviewed With: patient           Outcome Evaluation: PT is AAOx4, VSS, remains on SSI, BG WNL, no Humalog needed this shift, lantus held this morning per MD for BG of 91, refused BLE skin care, utilizes trapeze bar to shift weight and assist with bedpan, refuses to get OOB to ambulate or use BSC, frequently seeks out staff and PRN medications, no c/o of SOA, N/V/D, uses urinal, intermittently incontinent of bowel, last BM noted today, no acute events this shift, bed in low/locked position, call light and personal items within reach, continue with current POC at this time.

## 2024-06-24 LAB
GLUCOSE BLDC GLUCOMTR-MCNC: 106 MG/DL (ref 70–99)
GLUCOSE BLDC GLUCOMTR-MCNC: 113 MG/DL (ref 70–99)
GLUCOSE BLDC GLUCOMTR-MCNC: 133 MG/DL (ref 70–99)
GLUCOSE BLDC GLUCOMTR-MCNC: 160 MG/DL (ref 70–99)

## 2024-06-24 PROCEDURE — 63710000001 INSULIN LISPRO (HUMAN) PER 5 UNITS: Performed by: PHYSICIAN ASSISTANT

## 2024-06-24 PROCEDURE — 82948 REAGENT STRIP/BLOOD GLUCOSE: CPT

## 2024-06-24 PROCEDURE — 82948 REAGENT STRIP/BLOOD GLUCOSE: CPT | Performed by: INTERNAL MEDICINE

## 2024-06-24 PROCEDURE — 63710000001 INSULIN GLARGINE PER 5 UNITS: Performed by: PHYSICIAN ASSISTANT

## 2024-06-24 RX ADMIN — ACETAMINOPHEN 650 MG: 325 TABLET ORAL at 08:04

## 2024-06-24 RX ADMIN — MONTELUKAST 10 MG: 10 TABLET, FILM COATED ORAL at 20:22

## 2024-06-24 RX ADMIN — OXYCODONE AND ACETAMINOPHEN 1 TABLET: 10; 325 TABLET ORAL at 10:10

## 2024-06-24 RX ADMIN — IBUPROFEN 400 MG: 400 TABLET ORAL at 19:27

## 2024-06-24 RX ADMIN — OXYCODONE AND ACETAMINOPHEN 1 TABLET: 10; 325 TABLET ORAL at 20:22

## 2024-06-24 RX ADMIN — CETIRIZINE HYDROCHLORIDE 10 MG: 10 TABLET, FILM COATED ORAL at 08:04

## 2024-06-24 RX ADMIN — DICLOFENAC SODIUM 4 G: 10 GEL TOPICAL at 02:57

## 2024-06-24 RX ADMIN — BACLOFEN 10 MG: 10 TABLET ORAL at 20:22

## 2024-06-24 RX ADMIN — OXYCODONE AND ACETAMINOPHEN 1 TABLET: 10; 325 TABLET ORAL at 01:39

## 2024-06-24 RX ADMIN — LISINOPRIL 2.5 MG: 2.5 TABLET ORAL at 08:04

## 2024-06-24 RX ADMIN — ACETAMINOPHEN 650 MG: 325 TABLET ORAL at 17:21

## 2024-06-24 RX ADMIN — DICLOFENAC SODIUM 4 G: 10 GEL TOPICAL at 08:04

## 2024-06-24 RX ADMIN — EMPAGLIFLOZIN 10 MG: 10 TABLET, FILM COATED ORAL at 08:04

## 2024-06-24 RX ADMIN — INSULIN GLARGINE 20 UNITS: 100 INJECTION, SOLUTION SUBCUTANEOUS at 08:04

## 2024-06-24 RX ADMIN — SERTRALINE HYDROCHLORIDE 50 MG: 50 TABLET ORAL at 20:22

## 2024-06-24 RX ADMIN — Medication 500 MG: at 08:04

## 2024-06-24 RX ADMIN — PREGABALIN 100 MG: 100 CAPSULE ORAL at 20:22

## 2024-06-24 RX ADMIN — DICLOFENAC SODIUM 4 G: 10 GEL TOPICAL at 20:22

## 2024-06-24 RX ADMIN — APIXABAN 5 MG: 5 TABLET, FILM COATED ORAL at 20:22

## 2024-06-24 RX ADMIN — BACLOFEN 10 MG: 10 TABLET ORAL at 01:39

## 2024-06-24 RX ADMIN — Medication 10 MG: at 20:22

## 2024-06-24 RX ADMIN — BACLOFEN 10 MG: 10 TABLET ORAL at 10:10

## 2024-06-24 RX ADMIN — DICLOFENAC SODIUM 4 G: 10 GEL TOPICAL at 15:20

## 2024-06-24 RX ADMIN — INSULIN GLARGINE 22 UNITS: 100 INJECTION, SOLUTION SUBCUTANEOUS at 20:22

## 2024-06-24 RX ADMIN — PREGABALIN 100 MG: 100 CAPSULE ORAL at 08:04

## 2024-06-24 RX ADMIN — FERROUS SULFATE TAB 325 MG (65 MG ELEMENTAL FE) 325 MG: 325 (65 FE) TAB at 08:04

## 2024-06-24 RX ADMIN — PREGABALIN 100 MG: 100 CAPSULE ORAL at 17:21

## 2024-06-24 RX ADMIN — FUROSEMIDE 40 MG: 40 TABLET ORAL at 08:04

## 2024-06-24 RX ADMIN — APIXABAN 5 MG: 5 TABLET, FILM COATED ORAL at 08:04

## 2024-06-24 RX ADMIN — ATORVASTATIN CALCIUM 10 MG: 10 TABLET, FILM COATED ORAL at 20:22

## 2024-06-24 RX ADMIN — Medication 500 MG: at 20:22

## 2024-06-24 RX ADMIN — Medication 1 APPLICATION: at 22:50

## 2024-06-24 RX ADMIN — INSULIN LISPRO 3 UNITS: 100 INJECTION, SOLUTION INTRAVENOUS; SUBCUTANEOUS at 11:58

## 2024-06-24 RX ADMIN — CYANOCOBALAMIN TAB 500 MCG 1000 MCG: 500 TAB at 08:04

## 2024-06-24 NOTE — PLAN OF CARE
Goal Outcome Evaluation:  Plan of Care Reviewed With: patient           Outcome Evaluation: PT is AAOx4, VSS, remains on SSI, BG WNL, provider modified a.m. Lantus due to BG of 106, refused skin care, utilizes trapeze bar to shift weight and assist with bedpan, refuses to get OOB to ambulate or use BSC, frequently seeks out staff and PRN medications, no c/o of SOA, N/V/D, uses urinal, intermittently incontinent of bowel, last BM noted today, sleeps most of the day,  no acute events this shift, bed in low/locked position, call light and personal items within reach, continue with current POC at this time.

## 2024-06-25 LAB
GLUCOSE BLDC GLUCOMTR-MCNC: 121 MG/DL (ref 70–99)
GLUCOSE BLDC GLUCOMTR-MCNC: 162 MG/DL (ref 70–99)
GLUCOSE BLDC GLUCOMTR-MCNC: 187 MG/DL (ref 70–99)
GLUCOSE BLDC GLUCOMTR-MCNC: 93 MG/DL (ref 70–99)

## 2024-06-25 PROCEDURE — 63710000001 INSULIN LISPRO (HUMAN) PER 5 UNITS: Performed by: PHYSICIAN ASSISTANT

## 2024-06-25 PROCEDURE — 82948 REAGENT STRIP/BLOOD GLUCOSE: CPT

## 2024-06-25 PROCEDURE — 82948 REAGENT STRIP/BLOOD GLUCOSE: CPT | Performed by: INTERNAL MEDICINE

## 2024-06-25 PROCEDURE — 63710000001 INSULIN GLARGINE PER 5 UNITS: Performed by: PHYSICIAN ASSISTANT

## 2024-06-25 RX ADMIN — APIXABAN 5 MG: 5 TABLET, FILM COATED ORAL at 10:27

## 2024-06-25 RX ADMIN — IBUPROFEN 400 MG: 400 TABLET ORAL at 10:38

## 2024-06-25 RX ADMIN — Medication 10 MG: at 20:23

## 2024-06-25 RX ADMIN — DICLOFENAC SODIUM 4 G: 10 GEL TOPICAL at 20:26

## 2024-06-25 RX ADMIN — INSULIN LISPRO 3 UNITS: 100 INJECTION, SOLUTION INTRAVENOUS; SUBCUTANEOUS at 12:12

## 2024-06-25 RX ADMIN — Medication 1 APPLICATION: at 21:43

## 2024-06-25 RX ADMIN — OXYCODONE AND ACETAMINOPHEN 1 TABLET: 10; 325 TABLET ORAL at 04:37

## 2024-06-25 RX ADMIN — INSULIN LISPRO 3 UNITS: 100 INJECTION, SOLUTION INTRAVENOUS; SUBCUTANEOUS at 20:23

## 2024-06-25 RX ADMIN — SERTRALINE HYDROCHLORIDE 50 MG: 50 TABLET ORAL at 20:23

## 2024-06-25 RX ADMIN — ATORVASTATIN CALCIUM 10 MG: 10 TABLET, FILM COATED ORAL at 20:23

## 2024-06-25 RX ADMIN — Medication 500 MG: at 10:27

## 2024-06-25 RX ADMIN — MONTELUKAST 10 MG: 10 TABLET, FILM COATED ORAL at 20:23

## 2024-06-25 RX ADMIN — DICLOFENAC SODIUM 4 G: 10 GEL TOPICAL at 15:00

## 2024-06-25 RX ADMIN — CETIRIZINE HYDROCHLORIDE 10 MG: 10 TABLET, FILM COATED ORAL at 10:28

## 2024-06-25 RX ADMIN — DICLOFENAC SODIUM 4 G: 10 GEL TOPICAL at 10:25

## 2024-06-25 RX ADMIN — Medication 500 MG: at 20:23

## 2024-06-25 RX ADMIN — BACLOFEN 10 MG: 10 TABLET ORAL at 04:37

## 2024-06-25 RX ADMIN — FUROSEMIDE 40 MG: 40 TABLET ORAL at 10:27

## 2024-06-25 RX ADMIN — BACLOFEN 10 MG: 10 TABLET ORAL at 12:56

## 2024-06-25 RX ADMIN — FERROUS SULFATE TAB 325 MG (65 MG ELEMENTAL FE) 325 MG: 325 (65 FE) TAB at 10:28

## 2024-06-25 RX ADMIN — PREGABALIN 100 MG: 100 CAPSULE ORAL at 16:56

## 2024-06-25 RX ADMIN — OXYCODONE AND ACETAMINOPHEN 1 TABLET: 10; 325 TABLET ORAL at 21:43

## 2024-06-25 RX ADMIN — INSULIN GLARGINE 22 UNITS: 100 INJECTION, SOLUTION SUBCUTANEOUS at 20:23

## 2024-06-25 RX ADMIN — DICLOFENAC SODIUM 4 G: 10 GEL TOPICAL at 01:59

## 2024-06-25 RX ADMIN — CYANOCOBALAMIN TAB 500 MCG 1000 MCG: 500 TAB at 10:27

## 2024-06-25 RX ADMIN — INSULIN GLARGINE 20 UNITS: 100 INJECTION, SOLUTION SUBCUTANEOUS at 10:24

## 2024-06-25 RX ADMIN — BACLOFEN 10 MG: 10 TABLET ORAL at 21:43

## 2024-06-25 RX ADMIN — OXYCODONE AND ACETAMINOPHEN 1 TABLET: 10; 325 TABLET ORAL at 12:56

## 2024-06-25 RX ADMIN — PREGABALIN 100 MG: 100 CAPSULE ORAL at 10:27

## 2024-06-25 RX ADMIN — LISINOPRIL 2.5 MG: 2.5 TABLET ORAL at 10:27

## 2024-06-25 RX ADMIN — ACETAMINOPHEN 650 MG: 325 TABLET ORAL at 01:59

## 2024-06-25 RX ADMIN — APIXABAN 5 MG: 5 TABLET, FILM COATED ORAL at 20:23

## 2024-06-25 RX ADMIN — PREGABALIN 100 MG: 100 CAPSULE ORAL at 20:23

## 2024-06-25 RX ADMIN — EMPAGLIFLOZIN 10 MG: 10 TABLET, FILM COATED ORAL at 10:28

## 2024-06-25 NOTE — PLAN OF CARE
Goal Outcome Evaluation:  Plan of Care Reviewed With: patient        Progress: no change  Outcome Evaluation: Alert and oriented x4. Refused bath and Aveeno today. Given PRN Ibuprofen at 1038 for left shoulder pain. Med effective. Given PRN Baclofen and Percocet at 1256 for left hip pain. Meds effective. Voices needs. Con't current POC.

## 2024-06-26 LAB
GLUCOSE BLDC GLUCOMTR-MCNC: 104 MG/DL (ref 70–99)
GLUCOSE BLDC GLUCOMTR-MCNC: 126 MG/DL (ref 70–99)
GLUCOSE BLDC GLUCOMTR-MCNC: 128 MG/DL (ref 70–99)
GLUCOSE BLDC GLUCOMTR-MCNC: 129 MG/DL (ref 70–99)

## 2024-06-26 PROCEDURE — 82948 REAGENT STRIP/BLOOD GLUCOSE: CPT | Performed by: INTERNAL MEDICINE

## 2024-06-26 PROCEDURE — 82948 REAGENT STRIP/BLOOD GLUCOSE: CPT

## 2024-06-26 PROCEDURE — 63710000001 INSULIN GLARGINE PER 5 UNITS: Performed by: PHYSICIAN ASSISTANT

## 2024-06-26 RX ORDER — OXYCODONE AND ACETAMINOPHEN 10; 325 MG/1; MG/1
1 TABLET ORAL EVERY 8 HOURS PRN
Status: DISCONTINUED | OUTPATIENT
Start: 2024-06-26 | End: 2024-06-29

## 2024-06-26 RX ADMIN — OXYCODONE HYDROCHLORIDE AND ACETAMINOPHEN 1 TABLET: 10; 325 TABLET ORAL at 22:22

## 2024-06-26 RX ADMIN — CYANOCOBALAMIN TAB 500 MCG 1000 MCG: 500 TAB at 09:07

## 2024-06-26 RX ADMIN — Medication 500 MG: at 20:11

## 2024-06-26 RX ADMIN — BACLOFEN 10 MG: 10 TABLET ORAL at 05:59

## 2024-06-26 RX ADMIN — SERTRALINE HYDROCHLORIDE 50 MG: 50 TABLET ORAL at 20:11

## 2024-06-26 RX ADMIN — PREGABALIN 100 MG: 100 CAPSULE ORAL at 17:24

## 2024-06-26 RX ADMIN — APIXABAN 5 MG: 5 TABLET, FILM COATED ORAL at 09:07

## 2024-06-26 RX ADMIN — Medication 10 MG: at 20:11

## 2024-06-26 RX ADMIN — ATORVASTATIN CALCIUM 10 MG: 10 TABLET, FILM COATED ORAL at 20:11

## 2024-06-26 RX ADMIN — OXYCODONE AND ACETAMINOPHEN 1 TABLET: 10; 325 TABLET ORAL at 05:59

## 2024-06-26 RX ADMIN — DICLOFENAC SODIUM 4 G: 10 GEL TOPICAL at 20:14

## 2024-06-26 RX ADMIN — FERROUS SULFATE TAB 325 MG (65 MG ELEMENTAL FE) 325 MG: 325 (65 FE) TAB at 09:07

## 2024-06-26 RX ADMIN — Medication 500 MG: at 09:08

## 2024-06-26 RX ADMIN — DICLOFENAC SODIUM 4 G: 10 GEL TOPICAL at 14:00

## 2024-06-26 RX ADMIN — LISINOPRIL 2.5 MG: 2.5 TABLET ORAL at 09:08

## 2024-06-26 RX ADMIN — ACETAMINOPHEN 650 MG: 325 TABLET ORAL at 02:30

## 2024-06-26 RX ADMIN — OXYCODONE AND ACETAMINOPHEN 1 TABLET: 10; 325 TABLET ORAL at 14:00

## 2024-06-26 RX ADMIN — EMPAGLIFLOZIN 10 MG: 10 TABLET, FILM COATED ORAL at 09:08

## 2024-06-26 RX ADMIN — IBUPROFEN 400 MG: 400 TABLET ORAL at 09:07

## 2024-06-26 RX ADMIN — APIXABAN 5 MG: 5 TABLET, FILM COATED ORAL at 20:11

## 2024-06-26 RX ADMIN — INSULIN GLARGINE 22 UNITS: 100 INJECTION, SOLUTION SUBCUTANEOUS at 20:10

## 2024-06-26 RX ADMIN — BACLOFEN 10 MG: 10 TABLET ORAL at 14:03

## 2024-06-26 RX ADMIN — FUROSEMIDE 40 MG: 40 TABLET ORAL at 09:07

## 2024-06-26 RX ADMIN — BACLOFEN 10 MG: 10 TABLET ORAL at 22:22

## 2024-06-26 RX ADMIN — CETIRIZINE HYDROCHLORIDE 10 MG: 10 TABLET, FILM COATED ORAL at 09:08

## 2024-06-26 RX ADMIN — INSULIN GLARGINE 20 UNITS: 100 INJECTION, SOLUTION SUBCUTANEOUS at 09:07

## 2024-06-26 RX ADMIN — PREGABALIN 100 MG: 100 CAPSULE ORAL at 20:11

## 2024-06-26 RX ADMIN — PREGABALIN 100 MG: 100 CAPSULE ORAL at 09:08

## 2024-06-26 RX ADMIN — MONTELUKAST 10 MG: 10 TABLET, FILM COATED ORAL at 20:11

## 2024-06-26 RX ADMIN — DICLOFENAC SODIUM 4 G: 10 GEL TOPICAL at 02:30

## 2024-06-26 RX ADMIN — DICLOFENAC SODIUM 4 G: 10 GEL TOPICAL at 09:08

## 2024-06-26 NOTE — PLAN OF CARE
Goal Outcome Evaluation:  Plan of Care Reviewed With: patient           Outcome Evaluation: PT is AAOx4, VSS, remains on SSI, BG WNL, refused skin care, utilizes trapeze bar to shift weight and assist with bedpan, refuses to get OOB to ambulate or use BSC, frequently seeks out staff and PRN medications, no c/o of SOA, N/V/D, uses urinal, intermittently incontinent of bowel, last BM noted today, sleeps most of the day,  no acute events this shift, bed in low/locked position, call light and personal items within reach, continue with current POC at this time

## 2024-06-27 LAB
GLUCOSE BLDC GLUCOMTR-MCNC: 134 MG/DL (ref 70–99)
GLUCOSE BLDC GLUCOMTR-MCNC: 149 MG/DL (ref 70–99)
GLUCOSE BLDC GLUCOMTR-MCNC: 156 MG/DL (ref 70–99)
GLUCOSE BLDC GLUCOMTR-MCNC: 98 MG/DL (ref 70–99)

## 2024-06-27 PROCEDURE — 63710000001 INSULIN LISPRO (HUMAN) PER 5 UNITS: Performed by: PHYSICIAN ASSISTANT

## 2024-06-27 PROCEDURE — 82948 REAGENT STRIP/BLOOD GLUCOSE: CPT

## 2024-06-27 PROCEDURE — 82948 REAGENT STRIP/BLOOD GLUCOSE: CPT | Performed by: INTERNAL MEDICINE

## 2024-06-27 PROCEDURE — 63710000001 ONDANSETRON ODT 4 MG TABLET DISPERSIBLE: Performed by: INTERNAL MEDICINE

## 2024-06-27 PROCEDURE — 63710000001 INSULIN GLARGINE PER 5 UNITS: Performed by: PHYSICIAN ASSISTANT

## 2024-06-27 RX ADMIN — BACLOFEN 10 MG: 10 TABLET ORAL at 16:11

## 2024-06-27 RX ADMIN — APIXABAN 5 MG: 5 TABLET, FILM COATED ORAL at 09:33

## 2024-06-27 RX ADMIN — OXYCODONE HYDROCHLORIDE AND ACETAMINOPHEN 1 TABLET: 10; 325 TABLET ORAL at 16:11

## 2024-06-27 RX ADMIN — INSULIN GLARGINE 22 UNITS: 100 INJECTION, SOLUTION SUBCUTANEOUS at 20:07

## 2024-06-27 RX ADMIN — CETIRIZINE HYDROCHLORIDE 10 MG: 10 TABLET, FILM COATED ORAL at 09:33

## 2024-06-27 RX ADMIN — INSULIN GLARGINE 20 UNITS: 100 INJECTION, SOLUTION SUBCUTANEOUS at 09:32

## 2024-06-27 RX ADMIN — DICLOFENAC SODIUM 4 G: 10 GEL TOPICAL at 20:10

## 2024-06-27 RX ADMIN — ATORVASTATIN CALCIUM 10 MG: 10 TABLET, FILM COATED ORAL at 20:07

## 2024-06-27 RX ADMIN — CYANOCOBALAMIN TAB 500 MCG 1000 MCG: 500 TAB at 09:33

## 2024-06-27 RX ADMIN — MONTELUKAST 10 MG: 10 TABLET, FILM COATED ORAL at 20:07

## 2024-06-27 RX ADMIN — ONDANSETRON 4 MG: 4 TABLET, ORALLY DISINTEGRATING ORAL at 20:08

## 2024-06-27 RX ADMIN — BACLOFEN 10 MG: 10 TABLET ORAL at 06:34

## 2024-06-27 RX ADMIN — Medication 500 MG: at 09:33

## 2024-06-27 RX ADMIN — EMPAGLIFLOZIN 10 MG: 10 TABLET, FILM COATED ORAL at 09:33

## 2024-06-27 RX ADMIN — ACETAMINOPHEN 650 MG: 325 TABLET ORAL at 23:06

## 2024-06-27 RX ADMIN — DICLOFENAC SODIUM 4 G: 10 GEL TOPICAL at 09:32

## 2024-06-27 RX ADMIN — APIXABAN 5 MG: 5 TABLET, FILM COATED ORAL at 20:07

## 2024-06-27 RX ADMIN — OXYCODONE HYDROCHLORIDE AND ACETAMINOPHEN 1 TABLET: 10; 325 TABLET ORAL at 06:34

## 2024-06-27 RX ADMIN — PREGABALIN 100 MG: 100 CAPSULE ORAL at 17:00

## 2024-06-27 RX ADMIN — Medication 500 MG: at 20:07

## 2024-06-27 RX ADMIN — PREGABALIN 100 MG: 100 CAPSULE ORAL at 20:07

## 2024-06-27 RX ADMIN — DICLOFENAC SODIUM 4 G: 10 GEL TOPICAL at 13:53

## 2024-06-27 RX ADMIN — LISINOPRIL 2.5 MG: 2.5 TABLET ORAL at 09:33

## 2024-06-27 RX ADMIN — PREGABALIN 100 MG: 100 CAPSULE ORAL at 09:33

## 2024-06-27 RX ADMIN — ACETAMINOPHEN 650 MG: 325 TABLET ORAL at 03:32

## 2024-06-27 RX ADMIN — FERROUS SULFATE TAB 325 MG (65 MG ELEMENTAL FE) 325 MG: 325 (65 FE) TAB at 09:33

## 2024-06-27 RX ADMIN — SERTRALINE HYDROCHLORIDE 50 MG: 50 TABLET ORAL at 20:07

## 2024-06-27 RX ADMIN — ACETAMINOPHEN 650 MG: 325 TABLET ORAL at 13:53

## 2024-06-27 RX ADMIN — DICLOFENAC SODIUM 4 G: 10 GEL TOPICAL at 01:40

## 2024-06-27 RX ADMIN — Medication 10 MG: at 20:07

## 2024-06-27 RX ADMIN — INSULIN LISPRO 3 UNITS: 100 INJECTION, SOLUTION INTRAVENOUS; SUBCUTANEOUS at 17:38

## 2024-06-27 RX ADMIN — FUROSEMIDE 40 MG: 40 TABLET ORAL at 09:33

## 2024-06-27 NOTE — PLAN OF CARE
Goal Outcome Evaluation:  Plan of Care Reviewed With: patient        Progress: no change  Outcome Evaluation: Alert and oriented x4; no significant changes this shift. Medicated with Tylenol, Baclofen and Percocet for complaints of left hip pain with some relief. Voltaren gel applied to left shoulder and elbow per MAR. Refuses turns but uses trapeze bar to reposition and shift weight. Utilizes the urinal independently. Bedpan used for BM's with episodes of incontinence at times. No complaints of nausea voiced tonight. Call light within reach; care plan ongoing.

## 2024-06-27 NOTE — PLAN OF CARE
"Goal Outcome Evaluation:  Plan of Care Reviewed With: patient        Progress: no change  Outcome Evaluation: Patient is alert and oriented x4. Stated pain medication does not last long enough. \"Effects wear off within a couple of hours.\" Hospitalist notified. Stated will discuss options with Dr. Mehta. Given PRN Tylenol at 1353. Med somewhat effective. Given PRN Percocet and Baclofen at 1611 for left hip pain. Med effective. Refused bath and Aveeno. Voices needs. Con't current POC.                               "

## 2024-06-28 LAB
GLUCOSE BLDC GLUCOMTR-MCNC: 112 MG/DL (ref 70–99)
GLUCOSE BLDC GLUCOMTR-MCNC: 127 MG/DL (ref 70–99)
GLUCOSE BLDC GLUCOMTR-MCNC: 145 MG/DL (ref 70–99)
GLUCOSE BLDC GLUCOMTR-MCNC: 149 MG/DL (ref 70–99)

## 2024-06-28 PROCEDURE — 82948 REAGENT STRIP/BLOOD GLUCOSE: CPT | Performed by: INTERNAL MEDICINE

## 2024-06-28 PROCEDURE — 82948 REAGENT STRIP/BLOOD GLUCOSE: CPT

## 2024-06-28 PROCEDURE — 63710000001 INSULIN GLARGINE PER 5 UNITS: Performed by: PHYSICIAN ASSISTANT

## 2024-06-28 RX ADMIN — DICLOFENAC SODIUM 4 G: 10 GEL TOPICAL at 20:48

## 2024-06-28 RX ADMIN — SERTRALINE HYDROCHLORIDE 50 MG: 50 TABLET ORAL at 20:55

## 2024-06-28 RX ADMIN — INSULIN GLARGINE 20 UNITS: 100 INJECTION, SOLUTION SUBCUTANEOUS at 08:47

## 2024-06-28 RX ADMIN — PREGABALIN 100 MG: 100 CAPSULE ORAL at 16:42

## 2024-06-28 RX ADMIN — APIXABAN 5 MG: 5 TABLET, FILM COATED ORAL at 20:55

## 2024-06-28 RX ADMIN — INSULIN GLARGINE 22 UNITS: 100 INJECTION, SOLUTION SUBCUTANEOUS at 20:54

## 2024-06-28 RX ADMIN — EMPAGLIFLOZIN 10 MG: 10 TABLET, FILM COATED ORAL at 08:48

## 2024-06-28 RX ADMIN — LISINOPRIL 2.5 MG: 2.5 TABLET ORAL at 08:47

## 2024-06-28 RX ADMIN — DICLOFENAC SODIUM 4 G: 10 GEL TOPICAL at 08:49

## 2024-06-28 RX ADMIN — PREGABALIN 100 MG: 100 CAPSULE ORAL at 20:55

## 2024-06-28 RX ADMIN — PREGABALIN 100 MG: 100 CAPSULE ORAL at 08:48

## 2024-06-28 RX ADMIN — CETIRIZINE HYDROCHLORIDE 10 MG: 10 TABLET, FILM COATED ORAL at 08:48

## 2024-06-28 RX ADMIN — DICLOFENAC SODIUM 4 G: 10 GEL TOPICAL at 15:31

## 2024-06-28 RX ADMIN — FUROSEMIDE 40 MG: 40 TABLET ORAL at 08:48

## 2024-06-28 RX ADMIN — OXYCODONE HYDROCHLORIDE AND ACETAMINOPHEN 1 TABLET: 10; 325 TABLET ORAL at 16:42

## 2024-06-28 RX ADMIN — OXYCODONE HYDROCHLORIDE AND ACETAMINOPHEN 1 TABLET: 10; 325 TABLET ORAL at 00:18

## 2024-06-28 RX ADMIN — IBUPROFEN 400 MG: 400 TABLET ORAL at 17:48

## 2024-06-28 RX ADMIN — ATORVASTATIN CALCIUM 10 MG: 10 TABLET, FILM COATED ORAL at 20:55

## 2024-06-28 RX ADMIN — MONTELUKAST 10 MG: 10 TABLET, FILM COATED ORAL at 20:55

## 2024-06-28 RX ADMIN — CYANOCOBALAMIN TAB 500 MCG 1000 MCG: 500 TAB at 08:48

## 2024-06-28 RX ADMIN — Medication 500 MG: at 20:55

## 2024-06-28 RX ADMIN — ACETAMINOPHEN 650 MG: 325 TABLET ORAL at 19:31

## 2024-06-28 RX ADMIN — BACLOFEN 10 MG: 10 TABLET ORAL at 16:42

## 2024-06-28 RX ADMIN — Medication 10 MG: at 20:55

## 2024-06-28 RX ADMIN — Medication 500 MG: at 08:47

## 2024-06-28 RX ADMIN — OXYCODONE HYDROCHLORIDE AND ACETAMINOPHEN 1 TABLET: 10; 325 TABLET ORAL at 08:47

## 2024-06-28 RX ADMIN — BACLOFEN 10 MG: 10 TABLET ORAL at 00:18

## 2024-06-28 RX ADMIN — DICLOFENAC SODIUM 4 G: 10 GEL TOPICAL at 02:00

## 2024-06-28 RX ADMIN — FERROUS SULFATE TAB 325 MG (65 MG ELEMENTAL FE) 325 MG: 325 (65 FE) TAB at 08:48

## 2024-06-28 RX ADMIN — APIXABAN 5 MG: 5 TABLET, FILM COATED ORAL at 08:48

## 2024-06-28 RX ADMIN — BACLOFEN 10 MG: 10 TABLET ORAL at 08:48

## 2024-06-28 RX ADMIN — Medication 1 APPLICATION: at 15:31

## 2024-06-28 NOTE — PLAN OF CARE
Goal Outcome Evaluation:  Plan of Care Reviewed With: patient        Progress: no change  Outcome Evaluation: Resident alert, oriented, able to make needs known to staff. uses urinal independently at BS. Remains bedbound. sat on side of bed yesterday, but not today. Medicated for prn pain x2, patients states it only works for a little while. continues to refuse turns and offload buttock. Buttock with blanchable redness. Blood glucose stable for all 3 meals. continue current plan of care.

## 2024-06-28 NOTE — PLAN OF CARE
Goal Outcome Evaluation:  Plan of Care Reviewed With: patient        Progress: improving  Outcome Evaluation: Alert and oriented x4; able to make needs known to staff. No significant changes tonight. VSS. Blood sugar 149 at bedtime and scheduled Lantus given. Zofran given x1 for nausea with relief. Continues to ask for pain medication frequently; Tylenol, Baclofen and Percocet administered for left hip pain this shift with relief. Refuses skin care. Refuses turns but uses trapeze bar to shift weight. Utilizes urinal independently. Call light within reach; care plan ongoing.

## 2024-06-29 LAB
GLUCOSE BLDC GLUCOMTR-MCNC: 121 MG/DL (ref 70–99)
GLUCOSE BLDC GLUCOMTR-MCNC: 134 MG/DL (ref 70–99)
GLUCOSE BLDC GLUCOMTR-MCNC: 152 MG/DL (ref 70–99)
GLUCOSE BLDC GLUCOMTR-MCNC: 192 MG/DL (ref 70–99)
GLUCOSE BLDC GLUCOMTR-MCNC: 86 MG/DL (ref 70–99)

## 2024-06-29 PROCEDURE — 82948 REAGENT STRIP/BLOOD GLUCOSE: CPT | Performed by: INTERNAL MEDICINE

## 2024-06-29 PROCEDURE — 99309 SBSQ NF CARE MODERATE MDM 30: CPT | Performed by: PHYSICIAN ASSISTANT

## 2024-06-29 PROCEDURE — 63710000001 INSULIN GLARGINE PER 5 UNITS: Performed by: PHYSICIAN ASSISTANT

## 2024-06-29 PROCEDURE — 82948 REAGENT STRIP/BLOOD GLUCOSE: CPT

## 2024-06-29 PROCEDURE — 63710000001 INSULIN LISPRO (HUMAN) PER 5 UNITS: Performed by: PHYSICIAN ASSISTANT

## 2024-06-29 RX ORDER — FUROSEMIDE 40 MG
40 TABLET ORAL
Status: COMPLETED | OUTPATIENT
Start: 2024-06-29 | End: 2024-07-05

## 2024-06-29 RX ORDER — FUROSEMIDE 40 MG
40 TABLET ORAL DAILY
Status: DISCONTINUED | OUTPATIENT
Start: 2024-07-06 | End: 2024-07-19

## 2024-06-29 RX ORDER — OXYCODONE AND ACETAMINOPHEN 10; 325 MG/1; MG/1
1 TABLET ORAL EVERY 6 HOURS PRN
Status: DISCONTINUED | OUTPATIENT
Start: 2024-06-29 | End: 2024-08-04

## 2024-06-29 RX ADMIN — BACLOFEN 10 MG: 10 TABLET ORAL at 17:02

## 2024-06-29 RX ADMIN — FUROSEMIDE 40 MG: 40 TABLET ORAL at 17:02

## 2024-06-29 RX ADMIN — FUROSEMIDE 40 MG: 40 TABLET ORAL at 08:42

## 2024-06-29 RX ADMIN — DICLOFENAC SODIUM 4 G: 10 GEL TOPICAL at 02:29

## 2024-06-29 RX ADMIN — PREGABALIN 100 MG: 100 CAPSULE ORAL at 20:01

## 2024-06-29 RX ADMIN — BACLOFEN 10 MG: 10 TABLET ORAL at 08:41

## 2024-06-29 RX ADMIN — OXYCODONE HYDROCHLORIDE AND ACETAMINOPHEN 1 TABLET: 10; 325 TABLET ORAL at 00:51

## 2024-06-29 RX ADMIN — PREGABALIN 100 MG: 100 CAPSULE ORAL at 17:02

## 2024-06-29 RX ADMIN — Medication 500 MG: at 20:01

## 2024-06-29 RX ADMIN — INSULIN LISPRO 3 UNITS: 100 INJECTION, SOLUTION INTRAVENOUS; SUBCUTANEOUS at 08:41

## 2024-06-29 RX ADMIN — EMPAGLIFLOZIN 10 MG: 10 TABLET, FILM COATED ORAL at 08:41

## 2024-06-29 RX ADMIN — Medication 500 MG: at 08:41

## 2024-06-29 RX ADMIN — INSULIN GLARGINE 20 UNITS: 100 INJECTION, SOLUTION SUBCUTANEOUS at 08:40

## 2024-06-29 RX ADMIN — OXYCODONE HYDROCHLORIDE AND ACETAMINOPHEN 1 TABLET: 10; 325 TABLET ORAL at 08:42

## 2024-06-29 RX ADMIN — APIXABAN 5 MG: 5 TABLET, FILM COATED ORAL at 20:01

## 2024-06-29 RX ADMIN — DICLOFENAC SODIUM 4 G: 10 GEL TOPICAL at 19:57

## 2024-06-29 RX ADMIN — OXYCODONE AND ACETAMINOPHEN 1 TABLET: 10; 325 TABLET ORAL at 17:03

## 2024-06-29 RX ADMIN — PREGABALIN 100 MG: 100 CAPSULE ORAL at 08:41

## 2024-06-29 RX ADMIN — DICLOFENAC SODIUM 4 G: 10 GEL TOPICAL at 08:41

## 2024-06-29 RX ADMIN — Medication 10 MG: at 20:01

## 2024-06-29 RX ADMIN — ACETAMINOPHEN 650 MG: 325 TABLET ORAL at 11:56

## 2024-06-29 RX ADMIN — CETIRIZINE HYDROCHLORIDE 10 MG: 10 TABLET, FILM COATED ORAL at 08:41

## 2024-06-29 RX ADMIN — ATORVASTATIN CALCIUM 10 MG: 10 TABLET, FILM COATED ORAL at 20:01

## 2024-06-29 RX ADMIN — FERROUS SULFATE TAB 325 MG (65 MG ELEMENTAL FE) 325 MG: 325 (65 FE) TAB at 08:41

## 2024-06-29 RX ADMIN — BACLOFEN 10 MG: 10 TABLET ORAL at 00:51

## 2024-06-29 RX ADMIN — LISINOPRIL 2.5 MG: 2.5 TABLET ORAL at 08:42

## 2024-06-29 RX ADMIN — INSULIN GLARGINE 22 UNITS: 100 INJECTION, SOLUTION SUBCUTANEOUS at 20:01

## 2024-06-29 RX ADMIN — CYANOCOBALAMIN TAB 500 MCG 1000 MCG: 500 TAB at 08:41

## 2024-06-29 RX ADMIN — Medication 1 APPLICATION: at 21:36

## 2024-06-29 RX ADMIN — SERTRALINE HYDROCHLORIDE 50 MG: 50 TABLET ORAL at 20:01

## 2024-06-29 RX ADMIN — APIXABAN 5 MG: 5 TABLET, FILM COATED ORAL at 08:41

## 2024-06-29 RX ADMIN — ACETAMINOPHEN 650 MG: 325 TABLET ORAL at 22:27

## 2024-06-29 RX ADMIN — MONTELUKAST 10 MG: 10 TABLET, FILM COATED ORAL at 20:01

## 2024-06-29 NOTE — PLAN OF CARE
Goal Outcome Evaluation:  Plan of Care Reviewed With: patient        Progress: no change  Outcome Evaluation: PT is AAOx4, VSS, remains on SSI, BG WNL, refused skin care, utilizes trapeze bar to shift weight and assist with bedpan, refuses to get OOB to ambulate or use BSC, frequently seeks out staff and PRN medications, no c/o of SOA, N/V/D, uses urinal, intermittently incontinent of bowel, last BM noted today, sleeps most of the day,  no acute events this shift, bed in low/locked position, call light and personal items within reach, continue with current POC at this time.

## 2024-06-29 NOTE — PLAN OF CARE
Goal Outcome Evaluation:  Plan of Care Reviewed With: patient        Progress: no change  Outcome Evaluation: No significant change. Refused skin care with Aveeno lotion. Vital signs stable. Continues with scheduled Volaren for left should pain. PRN Percocet, Baclofen, and Tylenol also administered. Continue plan of care.

## 2024-06-29 NOTE — PROGRESS NOTES
[facility]   Hospitalist Progress Note  Date: 2024  Patient Name: Jose Shaikh  : 1960  MRN: [id number]  Date of admission: 2023      Subjective   Subjective     Chief Complaint: Weakness    Summary: Jose Shaikh is a 63 y.o. male  initially hospitalized on 9/10/2023, prolonged hospitalization for treatment of management of generalized weakness, deconditioning, with acute issues of diarrhea, C. difficile negative.  Diarrhea improved.  Had small hematoma after ambulating on his foot.  Unfortunately with exhaustive efforts to try to place this gentleman after 39 days of hospitalization, we are unable to find a facility that will accept him.  He has no further acute needs or requirements for inpatient monitoring and management, his labs and vitals are stable, he is tolerating oral intake, and has been refusing turns and repositionings and other interventions with nursing staff despite recommendations.  Patient discharged in hemodynamically stable condition on 10/19/2023 to follow-up with PCP within 1 week. Unfortunately we cannot solve all of his social issues during this hospitalization due to lack of resources and participation from the patient's perspective despite all our efforts.  Patient has a financial means of making arrangements at home.  Patient appealed his discharge.  Lost his appeal.  LCD: 10/20/23, PFL beginning: 10/21/23 @ 12pm.  Due to an inability to place the patient in a.m. nursing home he has been placed in our skilled nursing facility until arrangements can be made or until he is able to care for himself.      Interval Followup: 2024    No chest pains or dyspnea.  Pt concerned about lower extremity swelling.  Feels retaining fluid abdomen also.    /56  No nausea or vomiting  Multiple joint aches and pains. Left hip pain bothersome & limiting activities. Reports pain meds helping, but wear off too soon  Has an appointment  with orthopedic specialist in  [place] regarding hip        Review of systems: All systems reviewed and negative except what is noted above in interval follow-up   Objective   Objective     Vitals:   Temp:  [97.3 °F (36.3 °C)-97.5 °F (36.4 °C)] 97.3 °F (36.3 °C)  Heart Rate:  [70-76] 75  Resp:  [18] 18  BP: ()/(48-56) 107/56    Physical Exam   Constitutional: No distress.  Alert and conversational.  Respiratory: No wheeze noted.  GI: Abdomen: Obese  Neuro/psych:  Calm mood.  No dysarthria.  Extremities: Some lower extremity edema noted    Result Review    Result Review:  I have personally reviewed the results from the time of this admission to 6/29/2024 14:03 EDT and agree with these findings:  [x]  Laboratory  [x]  Microbiology  []  Radiology  []  EKG/Telemetry   []  Cardiology/Vascular   []  Pathology  []  Old records  []  Other:    Assessment & Plan   Assessment / Plan   Assessment:  Hospital-acquired weakness  Hx of Influenza A  Hx of C. difficile diarrhea treated  Generalized weakness  Deconditioning  DM (Kami Garcia and PCP)  HTN  PAF (on Eliquis; previously seen Dr. Duval)  Morbid obesity with BMI 44  Debility with wheelchair dependence  Hx of severe left hip pain  Severe degenerative joint disease of lower extremities  Hx L calcaneus osteomyelitis  Chronic venous stasis  Hx R transmetatarsal amputation  Chronic bilateral foot wounds with right transmetatarsal amputation, healing by secondary intention (wound care clinic; podiatrist Gordon)  Thrombocytopenia      Plan:    Increase Lasix to twice daily X 1 week.  Monitor edema/volume status, weight, BP, renal indices  Dietitian consult requested  Has orthopedic referral July 9  Continue PT/OT  As he continues to lose weight on Ozempic, he may be a candidate for surgical options  Appreciate input from wound care consult  As needed Lasix  Continue basal insulin and SSI per protocol titrate as needed  Continue Eliquis for stroke prophylaxis  Continue probiotics  Discussed with  RN    DVT prophylaxis:  Pharmacologic VTE prophylaxis orders are present.    CODE STATUS:   Code Status (Patient has no pulse and is not breathing): CPR (Attempt to Resuscitate)  Medical Interventions (Patient has pulse or is breathing): Full Support

## 2024-06-30 LAB
ANION GAP SERPL CALCULATED.3IONS-SCNC: 7.6 MMOL/L (ref 5–15)
BASOPHILS # BLD AUTO: 0.04 10*3/MM3 (ref 0–0.2)
BASOPHILS NFR BLD AUTO: 1.1 % (ref 0–1.5)
BUN SERPL-MCNC: 16 MG/DL (ref 8–23)
BUN/CREAT SERPL: 29.6 (ref 7–25)
CALCIUM SPEC-SCNC: 8 MG/DL (ref 8.6–10.5)
CHLORIDE SERPL-SCNC: 104 MMOL/L (ref 98–107)
CO2 SERPL-SCNC: 26.4 MMOL/L (ref 22–29)
CREAT SERPL-MCNC: 0.54 MG/DL (ref 0.76–1.27)
DEPRECATED RDW RBC AUTO: 50.2 FL (ref 37–54)
EGFRCR SERPLBLD CKD-EPI 2021: 110.6 ML/MIN/1.73
EOSINOPHIL # BLD AUTO: 0.1 10*3/MM3 (ref 0–0.4)
EOSINOPHIL NFR BLD AUTO: 2.8 % (ref 0.3–6.2)
ERYTHROCYTE [DISTWIDTH] IN BLOOD BY AUTOMATED COUNT: 15.4 % (ref 12.3–15.4)
GLUCOSE BLDC GLUCOMTR-MCNC: 112 MG/DL (ref 70–99)
GLUCOSE BLDC GLUCOMTR-MCNC: 152 MG/DL (ref 70–99)
GLUCOSE BLDC GLUCOMTR-MCNC: 172 MG/DL (ref 70–99)
GLUCOSE BLDC GLUCOMTR-MCNC: 190 MG/DL (ref 70–99)
GLUCOSE SERPL-MCNC: 113 MG/DL (ref 65–99)
HCT VFR BLD AUTO: 36.1 % (ref 37.5–51)
HGB BLD-MCNC: 11.4 G/DL (ref 13–17.7)
IMM GRANULOCYTES # BLD AUTO: 0 10*3/MM3 (ref 0–0.05)
IMM GRANULOCYTES NFR BLD AUTO: 0 % (ref 0–0.5)
LYMPHOCYTES # BLD AUTO: 1.05 10*3/MM3 (ref 0.7–3.1)
LYMPHOCYTES NFR BLD AUTO: 29.5 % (ref 19.6–45.3)
MAGNESIUM SERPL-MCNC: 2.1 MG/DL (ref 1.6–2.4)
MCH RBC QN AUTO: 27.9 PG (ref 26.6–33)
MCHC RBC AUTO-ENTMCNC: 31.6 G/DL (ref 31.5–35.7)
MCV RBC AUTO: 88.3 FL (ref 79–97)
MONOCYTES # BLD AUTO: 0.5 10*3/MM3 (ref 0.1–0.9)
MONOCYTES NFR BLD AUTO: 14 % (ref 5–12)
NEUTROPHILS NFR BLD AUTO: 1.87 10*3/MM3 (ref 1.7–7)
NEUTROPHILS NFR BLD AUTO: 52.6 % (ref 42.7–76)
NRBC BLD AUTO-RTO: 0 /100 WBC (ref 0–0.2)
PLATELET # BLD AUTO: 84 10*3/MM3 (ref 140–450)
PMV BLD AUTO: 11.9 FL (ref 6–12)
POTASSIUM SERPL-SCNC: 3.9 MMOL/L (ref 3.5–5.2)
RBC # BLD AUTO: 4.09 10*6/MM3 (ref 4.14–5.8)
SODIUM SERPL-SCNC: 138 MMOL/L (ref 136–145)
WBC NRBC COR # BLD AUTO: 3.56 10*3/MM3 (ref 3.4–10.8)

## 2024-06-30 PROCEDURE — 83735 ASSAY OF MAGNESIUM: CPT | Performed by: PHYSICIAN ASSISTANT

## 2024-06-30 PROCEDURE — 85025 COMPLETE CBC W/AUTO DIFF WBC: CPT | Performed by: PHYSICIAN ASSISTANT

## 2024-06-30 PROCEDURE — 63710000001 INSULIN GLARGINE PER 5 UNITS: Performed by: PHYSICIAN ASSISTANT

## 2024-06-30 PROCEDURE — 82948 REAGENT STRIP/BLOOD GLUCOSE: CPT

## 2024-06-30 PROCEDURE — 80048 BASIC METABOLIC PNL TOTAL CA: CPT | Performed by: PHYSICIAN ASSISTANT

## 2024-06-30 PROCEDURE — 82948 REAGENT STRIP/BLOOD GLUCOSE: CPT | Performed by: INTERNAL MEDICINE

## 2024-06-30 PROCEDURE — 63710000001 INSULIN LISPRO (HUMAN) PER 5 UNITS: Performed by: PHYSICIAN ASSISTANT

## 2024-06-30 PROCEDURE — 63710000001 ONDANSETRON ODT 4 MG TABLET DISPERSIBLE: Performed by: INTERNAL MEDICINE

## 2024-06-30 RX ADMIN — OXYCODONE AND ACETAMINOPHEN 1 TABLET: 10; 325 TABLET ORAL at 21:00

## 2024-06-30 RX ADMIN — APIXABAN 5 MG: 5 TABLET, FILM COATED ORAL at 08:37

## 2024-06-30 RX ADMIN — PREGABALIN 100 MG: 100 CAPSULE ORAL at 08:36

## 2024-06-30 RX ADMIN — OXYCODONE AND ACETAMINOPHEN 1 TABLET: 10; 325 TABLET ORAL at 08:36

## 2024-06-30 RX ADMIN — SERTRALINE HYDROCHLORIDE 50 MG: 50 TABLET ORAL at 20:59

## 2024-06-30 RX ADMIN — PREGABALIN 100 MG: 100 CAPSULE ORAL at 21:00

## 2024-06-30 RX ADMIN — Medication 500 MG: at 21:00

## 2024-06-30 RX ADMIN — INSULIN LISPRO 3 UNITS: 100 INJECTION, SOLUTION INTRAVENOUS; SUBCUTANEOUS at 17:32

## 2024-06-30 RX ADMIN — DICLOFENAC SODIUM 4 G: 10 GEL TOPICAL at 01:06

## 2024-06-30 RX ADMIN — ONDANSETRON 4 MG: 4 TABLET, ORALLY DISINTEGRATING ORAL at 17:15

## 2024-06-30 RX ADMIN — FUROSEMIDE 40 MG: 40 TABLET ORAL at 14:25

## 2024-06-30 RX ADMIN — INSULIN LISPRO 3 UNITS: 100 INJECTION, SOLUTION INTRAVENOUS; SUBCUTANEOUS at 20:58

## 2024-06-30 RX ADMIN — Medication 1 APPLICATION: at 10:02

## 2024-06-30 RX ADMIN — INSULIN GLARGINE 22 UNITS: 100 INJECTION, SOLUTION SUBCUTANEOUS at 20:59

## 2024-06-30 RX ADMIN — BACLOFEN 10 MG: 10 TABLET ORAL at 20:59

## 2024-06-30 RX ADMIN — Medication 500 MG: at 08:37

## 2024-06-30 RX ADMIN — DICLOFENAC SODIUM 4 G: 10 GEL TOPICAL at 08:37

## 2024-06-30 RX ADMIN — DICLOFENAC SODIUM 4 G: 10 GEL TOPICAL at 20:21

## 2024-06-30 RX ADMIN — Medication 1 APPLICATION: at 21:00

## 2024-06-30 RX ADMIN — BACLOFEN 10 MG: 10 TABLET ORAL at 08:36

## 2024-06-30 RX ADMIN — OXYCODONE AND ACETAMINOPHEN 1 TABLET: 10; 325 TABLET ORAL at 01:05

## 2024-06-30 RX ADMIN — ATORVASTATIN CALCIUM 10 MG: 10 TABLET, FILM COATED ORAL at 20:59

## 2024-06-30 RX ADMIN — INSULIN GLARGINE 20 UNITS: 100 INJECTION, SOLUTION SUBCUTANEOUS at 08:37

## 2024-06-30 RX ADMIN — CETIRIZINE HYDROCHLORIDE 10 MG: 10 TABLET, FILM COATED ORAL at 08:36

## 2024-06-30 RX ADMIN — CYANOCOBALAMIN TAB 500 MCG 1000 MCG: 500 TAB at 08:36

## 2024-06-30 RX ADMIN — BACLOFEN 10 MG: 10 TABLET ORAL at 01:06

## 2024-06-30 RX ADMIN — DICLOFENAC SODIUM 4 G: 10 GEL TOPICAL at 14:26

## 2024-06-30 RX ADMIN — PREGABALIN 100 MG: 100 CAPSULE ORAL at 16:33

## 2024-06-30 RX ADMIN — FUROSEMIDE 40 MG: 40 TABLET ORAL at 08:36

## 2024-06-30 RX ADMIN — LISINOPRIL 2.5 MG: 2.5 TABLET ORAL at 08:36

## 2024-06-30 RX ADMIN — INSULIN LISPRO 3 UNITS: 100 INJECTION, SOLUTION INTRAVENOUS; SUBCUTANEOUS at 12:09

## 2024-06-30 RX ADMIN — FERROUS SULFATE TAB 325 MG (65 MG ELEMENTAL FE) 325 MG: 325 (65 FE) TAB at 08:37

## 2024-06-30 RX ADMIN — MONTELUKAST 10 MG: 10 TABLET, FILM COATED ORAL at 20:59

## 2024-06-30 RX ADMIN — EMPAGLIFLOZIN 10 MG: 10 TABLET, FILM COATED ORAL at 08:36

## 2024-06-30 RX ADMIN — Medication 10 MG: at 20:59

## 2024-06-30 RX ADMIN — APIXABAN 5 MG: 5 TABLET, FILM COATED ORAL at 21:00

## 2024-06-30 NOTE — PLAN OF CARE
Goal Outcome Evaluation:  Plan of Care Reviewed With: patient        Progress: no change  Outcome Evaluation: Alert and oriented X 4. Able to communicate needs. Vital signs stable. Continues use of trapeze for repositioning. Watched television for much of night. Tylenol, Percocet, and Baclofen administered for pain management to left shoulder and hip. Schedule Voltaren also administered for pain in left shoulder/Arm. Continue plan of care.

## 2024-06-30 NOTE — PLAN OF CARE
Goal Outcome Evaluation:           Progress: no change  Outcome Evaluation: Rsd. is alert and oriented, able to make needs known to staff.  Rsd. has rested well this shift in between care.  Medicated x1 with prn baclofen and oxycodone, and zofran.  Rsd.  States medications effective.  Rsd. also recieving scheduled voltaren gel to L) shoulder and L) elbow.  Rsd. tolerating well, states effective.  Call light and personal items within reach, Rsd. reminded to use call light for assistance, verbalizes understanding.  Rsd. refuses turns.  Weight shifts with trapeze bar.  Skin care completed to [place]. lower legs this shift.  Aveeno lotion applied.  Rsd. refuses to transfer or get out of bed this shift.  X1-2 assist to turn and place on bedpan.  Activity encouraged.  Rsd. Refused to be bathed or do oral care this shift.  WIll continue to monitor and notify on-coming staff.  Current plan of care remains in place this shift.

## 2024-07-01 LAB
GLUCOSE BLDC GLUCOMTR-MCNC: 163 MG/DL (ref 70–99)
GLUCOSE BLDC GLUCOMTR-MCNC: 177 MG/DL (ref 70–99)
GLUCOSE BLDC GLUCOMTR-MCNC: 178 MG/DL (ref 70–99)
GLUCOSE BLDC GLUCOMTR-MCNC: 92 MG/DL (ref 70–99)

## 2024-07-01 PROCEDURE — 82948 REAGENT STRIP/BLOOD GLUCOSE: CPT

## 2024-07-01 PROCEDURE — 63710000001 INSULIN LISPRO (HUMAN) PER 5 UNITS: Performed by: PHYSICIAN ASSISTANT

## 2024-07-01 PROCEDURE — 82948 REAGENT STRIP/BLOOD GLUCOSE: CPT | Performed by: INTERNAL MEDICINE

## 2024-07-01 PROCEDURE — 63710000001 INSULIN GLARGINE PER 5 UNITS: Performed by: PHYSICIAN ASSISTANT

## 2024-07-01 RX ADMIN — APIXABAN 5 MG: 5 TABLET, FILM COATED ORAL at 22:17

## 2024-07-01 RX ADMIN — FUROSEMIDE 40 MG: 40 TABLET ORAL at 15:10

## 2024-07-01 RX ADMIN — Medication 10 MG: at 22:18

## 2024-07-01 RX ADMIN — INSULIN LISPRO 3 UNITS: 100 INJECTION, SOLUTION INTRAVENOUS; SUBCUTANEOUS at 17:53

## 2024-07-01 RX ADMIN — LISINOPRIL 2.5 MG: 2.5 TABLET ORAL at 10:00

## 2024-07-01 RX ADMIN — DICLOFENAC SODIUM 4 G: 10 GEL TOPICAL at 14:14

## 2024-07-01 RX ADMIN — BACLOFEN 10 MG: 10 TABLET ORAL at 14:14

## 2024-07-01 RX ADMIN — Medication 500 MG: at 10:00

## 2024-07-01 RX ADMIN — ACETAMINOPHEN 650 MG: 325 TABLET ORAL at 03:30

## 2024-07-01 RX ADMIN — INSULIN LISPRO 3 UNITS: 100 INJECTION, SOLUTION INTRAVENOUS; SUBCUTANEOUS at 22:19

## 2024-07-01 RX ADMIN — INSULIN GLARGINE 20 UNITS: 100 INJECTION, SOLUTION SUBCUTANEOUS at 10:00

## 2024-07-01 RX ADMIN — APIXABAN 5 MG: 5 TABLET, FILM COATED ORAL at 10:00

## 2024-07-01 RX ADMIN — BACLOFEN 10 MG: 10 TABLET ORAL at 05:18

## 2024-07-01 RX ADMIN — PREGABALIN 100 MG: 100 CAPSULE ORAL at 10:00

## 2024-07-01 RX ADMIN — MONTELUKAST 10 MG: 10 TABLET, FILM COATED ORAL at 22:17

## 2024-07-01 RX ADMIN — PREGABALIN 100 MG: 100 CAPSULE ORAL at 15:11

## 2024-07-01 RX ADMIN — BACLOFEN 10 MG: 10 TABLET ORAL at 22:25

## 2024-07-01 RX ADMIN — OXYCODONE AND ACETAMINOPHEN 1 TABLET: 10; 325 TABLET ORAL at 22:25

## 2024-07-01 RX ADMIN — SERTRALINE HYDROCHLORIDE 50 MG: 50 TABLET ORAL at 22:16

## 2024-07-01 RX ADMIN — INSULIN GLARGINE 22 UNITS: 100 INJECTION, SOLUTION SUBCUTANEOUS at 22:19

## 2024-07-01 RX ADMIN — FERROUS SULFATE TAB 325 MG (65 MG ELEMENTAL FE) 325 MG: 325 (65 FE) TAB at 07:56

## 2024-07-01 RX ADMIN — FUROSEMIDE 40 MG: 40 TABLET ORAL at 10:00

## 2024-07-01 RX ADMIN — DICLOFENAC SODIUM 4 G: 10 GEL TOPICAL at 02:31

## 2024-07-01 RX ADMIN — INSULIN LISPRO 3 UNITS: 100 INJECTION, SOLUTION INTRAVENOUS; SUBCUTANEOUS at 12:11

## 2024-07-01 RX ADMIN — Medication 1 APPLICATION: at 10:23

## 2024-07-01 RX ADMIN — ATORVASTATIN CALCIUM 10 MG: 10 TABLET, FILM COATED ORAL at 22:18

## 2024-07-01 RX ADMIN — CETIRIZINE HYDROCHLORIDE 10 MG: 10 TABLET, FILM COATED ORAL at 10:00

## 2024-07-01 RX ADMIN — Medication 500 MG: at 22:24

## 2024-07-01 RX ADMIN — IBUPROFEN 400 MG: 400 TABLET ORAL at 10:19

## 2024-07-01 RX ADMIN — OXYCODONE AND ACETAMINOPHEN 1 TABLET: 10; 325 TABLET ORAL at 05:18

## 2024-07-01 RX ADMIN — CYANOCOBALAMIN TAB 500 MCG 1000 MCG: 500 TAB at 10:00

## 2024-07-01 RX ADMIN — PREGABALIN 100 MG: 100 CAPSULE ORAL at 22:26

## 2024-07-01 RX ADMIN — OXYCODONE AND ACETAMINOPHEN 1 TABLET: 10; 325 TABLET ORAL at 14:14

## 2024-07-01 RX ADMIN — EMPAGLIFLOZIN 10 MG: 10 TABLET, FILM COATED ORAL at 10:00

## 2024-07-01 RX ADMIN — DICLOFENAC SODIUM 4 G: 10 GEL TOPICAL at 22:28

## 2024-07-01 RX ADMIN — DICLOFENAC SODIUM 4 G: 10 GEL TOPICAL at 07:56

## 2024-07-01 NOTE — PLAN OF CARE
Goal Outcome Evaluation:  Plan of Care Reviewed With: patient        Progress: no change  Outcome Evaluation: Alert and oriented. Vital signs stable. Continues with use of trapeze for repositioning. Tylenol, Percocet, and Baclofen administered for left shoulder and hip pain. Continue plan of care.

## 2024-07-02 LAB
GLUCOSE BLDC GLUCOMTR-MCNC: 119 MG/DL (ref 70–99)
GLUCOSE BLDC GLUCOMTR-MCNC: 122 MG/DL (ref 70–99)
GLUCOSE BLDC GLUCOMTR-MCNC: 126 MG/DL (ref 70–99)
GLUCOSE BLDC GLUCOMTR-MCNC: 139 MG/DL (ref 70–99)
GLUCOSE BLDC GLUCOMTR-MCNC: 248 MG/DL (ref 70–99)
GLUCOSE BLDC GLUCOMTR-MCNC: 258 MG/DL (ref 70–99)

## 2024-07-02 PROCEDURE — 82948 REAGENT STRIP/BLOOD GLUCOSE: CPT | Performed by: INTERNAL MEDICINE

## 2024-07-02 PROCEDURE — 63710000001 ONDANSETRON ODT 4 MG TABLET DISPERSIBLE: Performed by: INTERNAL MEDICINE

## 2024-07-02 PROCEDURE — 82948 REAGENT STRIP/BLOOD GLUCOSE: CPT

## 2024-07-02 PROCEDURE — 63710000001 INSULIN LISPRO (HUMAN) PER 5 UNITS: Performed by: PHYSICIAN ASSISTANT

## 2024-07-02 PROCEDURE — 63710000001 INSULIN GLARGINE PER 5 UNITS: Performed by: PHYSICIAN ASSISTANT

## 2024-07-02 RX ADMIN — PREGABALIN 100 MG: 100 CAPSULE ORAL at 20:46

## 2024-07-02 RX ADMIN — DICLOFENAC SODIUM 4 G: 10 GEL TOPICAL at 20:45

## 2024-07-02 RX ADMIN — INSULIN GLARGINE 22 UNITS: 100 INJECTION, SOLUTION SUBCUTANEOUS at 20:45

## 2024-07-02 RX ADMIN — CYANOCOBALAMIN TAB 500 MCG 1000 MCG: 500 TAB at 08:59

## 2024-07-02 RX ADMIN — ATORVASTATIN CALCIUM 10 MG: 10 TABLET, FILM COATED ORAL at 20:46

## 2024-07-02 RX ADMIN — INSULIN LISPRO 5 UNITS: 100 INJECTION, SOLUTION INTRAVENOUS; SUBCUTANEOUS at 20:45

## 2024-07-02 RX ADMIN — MONTELUKAST 10 MG: 10 TABLET, FILM COATED ORAL at 20:46

## 2024-07-02 RX ADMIN — Medication 1 APPLICATION: at 09:00

## 2024-07-02 RX ADMIN — ACETAMINOPHEN 650 MG: 325 TABLET ORAL at 03:34

## 2024-07-02 RX ADMIN — SERTRALINE HYDROCHLORIDE 50 MG: 50 TABLET ORAL at 20:46

## 2024-07-02 RX ADMIN — DICLOFENAC SODIUM 4 G: 10 GEL TOPICAL at 14:51

## 2024-07-02 RX ADMIN — FUROSEMIDE 40 MG: 40 TABLET ORAL at 08:58

## 2024-07-02 RX ADMIN — OXYCODONE AND ACETAMINOPHEN 1 TABLET: 10; 325 TABLET ORAL at 14:50

## 2024-07-02 RX ADMIN — APIXABAN 5 MG: 5 TABLET, FILM COATED ORAL at 20:46

## 2024-07-02 RX ADMIN — ACETAMINOPHEN 650 MG: 325 TABLET ORAL at 13:03

## 2024-07-02 RX ADMIN — APIXABAN 5 MG: 5 TABLET, FILM COATED ORAL at 08:59

## 2024-07-02 RX ADMIN — FERROUS SULFATE TAB 325 MG (65 MG ELEMENTAL FE) 325 MG: 325 (65 FE) TAB at 08:59

## 2024-07-02 RX ADMIN — Medication 500 MG: at 08:58

## 2024-07-02 RX ADMIN — ONDANSETRON 4 MG: 4 TABLET, ORALLY DISINTEGRATING ORAL at 13:00

## 2024-07-02 RX ADMIN — EMPAGLIFLOZIN 10 MG: 10 TABLET, FILM COATED ORAL at 08:58

## 2024-07-02 RX ADMIN — DICLOFENAC SODIUM 4 G: 10 GEL TOPICAL at 08:58

## 2024-07-02 RX ADMIN — BACLOFEN 10 MG: 10 TABLET ORAL at 14:51

## 2024-07-02 RX ADMIN — INSULIN GLARGINE 20 UNITS: 100 INJECTION, SOLUTION SUBCUTANEOUS at 08:59

## 2024-07-02 RX ADMIN — BACLOFEN 10 MG: 10 TABLET ORAL at 22:53

## 2024-07-02 RX ADMIN — LISINOPRIL 2.5 MG: 2.5 TABLET ORAL at 08:58

## 2024-07-02 RX ADMIN — PREGABALIN 100 MG: 100 CAPSULE ORAL at 15:50

## 2024-07-02 RX ADMIN — Medication 500 MG: at 20:46

## 2024-07-02 RX ADMIN — PREGABALIN 100 MG: 100 CAPSULE ORAL at 08:58

## 2024-07-02 RX ADMIN — BACLOFEN 10 MG: 10 TABLET ORAL at 06:21

## 2024-07-02 RX ADMIN — OXYCODONE AND ACETAMINOPHEN 1 TABLET: 10; 325 TABLET ORAL at 06:22

## 2024-07-02 RX ADMIN — CETIRIZINE HYDROCHLORIDE 10 MG: 10 TABLET, FILM COATED ORAL at 08:59

## 2024-07-02 RX ADMIN — FUROSEMIDE 40 MG: 40 TABLET ORAL at 15:49

## 2024-07-02 RX ADMIN — OXYCODONE AND ACETAMINOPHEN 1 TABLET: 10; 325 TABLET ORAL at 22:53

## 2024-07-02 RX ADMIN — Medication 10 MG: at 20:46

## 2024-07-02 NOTE — PLAN OF CARE
Goal Outcome Evaluation:  Plan of Care Reviewed With: patient        Progress: no change  Outcome Evaluation: Alert and oriented x4. Given PRN Ibuprofen for left shoulder pain at 1019. Med effective. Given PRN Baclofen and Percocet at 1414. Med effective. Stated head is stuffy today but did not want nurse to notify MD. Stated aready on Zyrtec and does not want nasal spray. Voices needs. Con't current POC.

## 2024-07-02 NOTE — SIGNIFICANT NOTE
Wound Eval / Progress Noted    KEO Dominguez     Patient Name: Jose Shaikh  : 1960  MRN: [id number]  Today's Date: 2024                 Admit Date: 2023    Visit Dx:    ICD-10-CM ICD-9-CM   1. Difficulty in walking  R26.2 719.7   2. Type 2 diabetes mellitus with other specified complication, unspecified whether long term insulin use  E11.69 250.80         Debility    Ulcer of right foot    Ulcerated, foot, left, limited to breakdown of skin    Onychomycosis        Past Medical History:   Diagnosis Date    Absence of toe of right foot     Acute osteomyelitis of left calcaneus  2021    Anxiety and depression     Arthritis     Cancer     Chronic pain     STATES HIS PAIN IS 10/10 AAT    Claustrophobia     Corns and callus     Diabetic ulcer of left heel associated with type 2 DM 2021    Diabetic ulcer of left heel associated with type 2 DM 2021    Diabetic ulcer of right midfoot associated with type 2 DM 2021    Difficulty walking     Essential hypertension 2021    Hammertoe     Hyperlipidemia LDL goal <100 2021    Ingrown toenail     Obesity     Paroxysmal atrial fibrillation 2021    Polyneuropathy     Pressure ulcer, stage 1     Tinea unguium     Type 2 diabetes mellitus with polyneuropathy         Past Surgical History:   Procedure Laterality Date    CYST REMOVAL      center of back; benign    EYE SURGERY      INCISION AND DRAINAGE ABSCESS      back    INCISION AND DRAINAGE LEG Right 12/10/2021    Procedure: INCISION AND DRAINAGE LOWER EXTREMITY;  Surgeon: Ash Leyva DPM;  Location: [facility] MAIN OR;  Service: Podiatry;  Laterality: Right;    OTHER SURGICAL HISTORY      Surgical clips left foot    TOE SURGERY Right     Removal of 5th toe    TRANS METATARSAL AMPUTATION Right 2021    Procedure: AMPUTATION TRANS METATARSAL;  Surgeon: Ash Leyva DPM;  Location: [facility] MAIN OR;  Service: Podiatry;  Laterality: Right;    VASCULAR  SURGERY      WRIST SURGERY Left     repair of injury         Physical Assessment:  Rash 04/06/24 1659 Left calf plaque (Active)   Wound Image   07/02/24 1208   Distribution localized 07/02/24 1208   Color pink;red 07/02/24 1208   Configuration/Shape asymmetric 07/02/24 1208   Borders irregular 07/02/24 1208   Characteristics raised 07/02/24 1208   Care, Rash other (see comments) 07/01/24 2300       Rash 04/17/24 1443 Right lower leg macular (Active)   Wound Image   07/02/24 1208   Distribution localized 07/02/24 1208   Color pink;red 07/02/24 1208   Configuration/Shape asymmetric 07/02/24 1208   Borders irregular 07/02/24 1208   Characteristics raised 07/02/24 1208          Wound Check / Follow-up: Patient seen today for wound follow-up.  Patient is awake, alert and oriented at time of visit.  He is agreeable to assessment.    Patient remains with moisture within skin folds to lower abdomen and bilateral groin.  All tissue remains intact at this time.  No fungal presentation or yeast noted.  Recommending to continue skin care with application of cornstarch powder.    Patient remains with chronic discolorations to bilateral lower legs.  Patient has raised areas to left calf and right anterior lower leg.  No drainage is noted at this time.  Area remains stable and seemingly unchanged from last assessment.  Recommended to continue skin care and topical treatment.    Right distal foot crusting removed with cleansing.  All tissue remains intact at this time.  Recommending to continue skin care and skin protection.    Patient refuses assessment of sacral area at this time, stating he is hurting at his left hip.  He denies any other skin issues.      Impression: Moisture within skin folds to lower abdomen and bilateral groin.  Chronic discoloration to bilateral lower legs.  Raised areas to left calf and right anterior lower leg.      Short term goals: Regain skin integrity, skin protection, moisture prevention, pressure  reduction, skin care, topical treatment.    Dottie Guevara RN    7/2/2024    14:53 EDT

## 2024-07-02 NOTE — PLAN OF CARE
Goal Outcome Evaluation:  Plan of Care Reviewed With: patient        Progress: no change  Outcome Evaluation: Resident alert, oriented, able to make needs known to staff. resident remained bedfast today, did not sit on side of bed. Medicated for prn pain x1 with baclofen and Percocet, effective. Medicated with Zofran for prn nausea x1, effective. Wound nurse here for f/u visit today. see chart for new notes. Resident pleasant and cooperative.

## 2024-07-03 LAB
GLUCOSE BLDC GLUCOMTR-MCNC: 171 MG/DL (ref 70–99)
GLUCOSE BLDC GLUCOMTR-MCNC: 180 MG/DL (ref 70–99)
GLUCOSE BLDC GLUCOMTR-MCNC: 184 MG/DL (ref 70–99)
GLUCOSE BLDC GLUCOMTR-MCNC: 196 MG/DL (ref 70–99)
GLUCOSE BLDC GLUCOMTR-MCNC: 92 MG/DL (ref 70–99)

## 2024-07-03 PROCEDURE — 82948 REAGENT STRIP/BLOOD GLUCOSE: CPT | Performed by: INTERNAL MEDICINE

## 2024-07-03 PROCEDURE — 63710000001 INSULIN LISPRO (HUMAN) PER 5 UNITS: Performed by: PHYSICIAN ASSISTANT

## 2024-07-03 PROCEDURE — 82948 REAGENT STRIP/BLOOD GLUCOSE: CPT

## 2024-07-03 PROCEDURE — 63710000001 INSULIN GLARGINE PER 5 UNITS: Performed by: PHYSICIAN ASSISTANT

## 2024-07-03 RX ADMIN — FUROSEMIDE 40 MG: 40 TABLET ORAL at 15:26

## 2024-07-03 RX ADMIN — IBUPROFEN 400 MG: 400 TABLET ORAL at 13:21

## 2024-07-03 RX ADMIN — INSULIN LISPRO 3 UNITS: 100 INJECTION, SOLUTION INTRAVENOUS; SUBCUTANEOUS at 11:31

## 2024-07-03 RX ADMIN — IBUPROFEN 400 MG: 400 TABLET ORAL at 00:21

## 2024-07-03 RX ADMIN — PREGABALIN 100 MG: 100 CAPSULE ORAL at 22:05

## 2024-07-03 RX ADMIN — Medication 1 APPLICATION: at 11:31

## 2024-07-03 RX ADMIN — BACLOFEN 10 MG: 10 TABLET ORAL at 07:04

## 2024-07-03 RX ADMIN — Medication 500 MG: at 08:12

## 2024-07-03 RX ADMIN — APIXABAN 5 MG: 5 TABLET, FILM COATED ORAL at 22:05

## 2024-07-03 RX ADMIN — CYANOCOBALAMIN TAB 500 MCG 1000 MCG: 500 TAB at 08:13

## 2024-07-03 RX ADMIN — LISINOPRIL 2.5 MG: 2.5 TABLET ORAL at 08:12

## 2024-07-03 RX ADMIN — INSULIN LISPRO 3 UNITS: 100 INJECTION, SOLUTION INTRAVENOUS; SUBCUTANEOUS at 22:07

## 2024-07-03 RX ADMIN — FUROSEMIDE 40 MG: 40 TABLET ORAL at 08:12

## 2024-07-03 RX ADMIN — DICLOFENAC SODIUM 4 G: 10 GEL TOPICAL at 02:49

## 2024-07-03 RX ADMIN — CETIRIZINE HYDROCHLORIDE 10 MG: 10 TABLET, FILM COATED ORAL at 08:13

## 2024-07-03 RX ADMIN — DICLOFENAC SODIUM 4 G: 10 GEL TOPICAL at 22:09

## 2024-07-03 RX ADMIN — BACLOFEN 10 MG: 10 TABLET ORAL at 19:10

## 2024-07-03 RX ADMIN — INSULIN GLARGINE 22 UNITS: 100 INJECTION, SOLUTION SUBCUTANEOUS at 22:06

## 2024-07-03 RX ADMIN — SERTRALINE HYDROCHLORIDE 50 MG: 50 TABLET ORAL at 22:06

## 2024-07-03 RX ADMIN — Medication 500 MG: at 22:04

## 2024-07-03 RX ADMIN — EMPAGLIFLOZIN 10 MG: 10 TABLET, FILM COATED ORAL at 08:13

## 2024-07-03 RX ADMIN — MONTELUKAST 10 MG: 10 TABLET, FILM COATED ORAL at 22:05

## 2024-07-03 RX ADMIN — INSULIN LISPRO 3 UNITS: 100 INJECTION, SOLUTION INTRAVENOUS; SUBCUTANEOUS at 17:42

## 2024-07-03 RX ADMIN — Medication 10 MG: at 22:06

## 2024-07-03 RX ADMIN — FERROUS SULFATE TAB 325 MG (65 MG ELEMENTAL FE) 325 MG: 325 (65 FE) TAB at 08:13

## 2024-07-03 RX ADMIN — DICLOFENAC SODIUM 4 G: 10 GEL TOPICAL at 08:12

## 2024-07-03 RX ADMIN — DICLOFENAC SODIUM 4 G: 10 GEL TOPICAL at 15:26

## 2024-07-03 RX ADMIN — APIXABAN 5 MG: 5 TABLET, FILM COATED ORAL at 08:13

## 2024-07-03 RX ADMIN — PREGABALIN 100 MG: 100 CAPSULE ORAL at 15:26

## 2024-07-03 RX ADMIN — INSULIN GLARGINE 20 UNITS: 100 INJECTION, SOLUTION SUBCUTANEOUS at 08:12

## 2024-07-03 RX ADMIN — PREGABALIN 100 MG: 100 CAPSULE ORAL at 08:13

## 2024-07-03 RX ADMIN — OXYCODONE AND ACETAMINOPHEN 1 TABLET: 10; 325 TABLET ORAL at 19:10

## 2024-07-03 RX ADMIN — OXYCODONE AND ACETAMINOPHEN 1 TABLET: 10; 325 TABLET ORAL at 07:04

## 2024-07-03 RX ADMIN — ATORVASTATIN CALCIUM 10 MG: 10 TABLET, FILM COATED ORAL at 22:04

## 2024-07-03 NOTE — PLAN OF CARE
Goal Outcome Evaluation:  Plan of Care Reviewed With: patient        Progress: no change  Outcome Evaluation: Alert and oriented x4; able to call for assist as needed. Blood sugar 248 at bedtime; insulin given per MAR. Resident called out at 2336 requesting BS be checked due to feeling shaky. Blood sugar 126; resident eating snack at that time. Continues to use trapeze bar to shift weight but refuses turns and skin care when offered. Baclofen, Percocet and Motrin administered for left shoulder and hip pain. Slept in intervals tonight. Call light within reach; care plan ongoing.

## 2024-07-03 NOTE — PLAN OF CARE
Goal Outcome Evaluation:  Plan of Care Reviewed With: patient        Progress: no change  Outcome Evaluation: Resident alert, oriented, able to make needs known to staff. Remains bedbound. very irritable today about meals, menu choices, tray delivery, etc. Refused to get up for weight today. refused to participate at in bed exercises. Ozempic shot given today, resident first argumentative over what weekday is today but then agreed that it was Wednesday and he would take his shot. refuses to off load weight by turning side/side. Asked this nurse to send message to MD that he wants a shot in his shoulder for pain. message sent to PA, no new orders. continue current plan of care.

## 2024-07-04 LAB
ANION GAP SERPL CALCULATED.3IONS-SCNC: 7.3 MMOL/L (ref 5–15)
BUN SERPL-MCNC: 21 MG/DL (ref 8–23)
BUN/CREAT SERPL: 44.7 (ref 7–25)
CALCIUM SPEC-SCNC: 8.1 MG/DL (ref 8.6–10.5)
CHLORIDE SERPL-SCNC: 104 MMOL/L (ref 98–107)
CO2 SERPL-SCNC: 24.7 MMOL/L (ref 22–29)
CREAT SERPL-MCNC: 0.47 MG/DL (ref 0.76–1.27)
EGFRCR SERPLBLD CKD-EPI 2021: 115.3 ML/MIN/1.73
GLUCOSE BLDC GLUCOMTR-MCNC: 126 MG/DL (ref 70–99)
GLUCOSE BLDC GLUCOMTR-MCNC: 131 MG/DL (ref 70–99)
GLUCOSE BLDC GLUCOMTR-MCNC: 172 MG/DL (ref 70–99)
GLUCOSE BLDC GLUCOMTR-MCNC: 239 MG/DL (ref 70–99)
GLUCOSE BLDC GLUCOMTR-MCNC: 243 MG/DL (ref 70–99)
GLUCOSE SERPL-MCNC: 144 MG/DL (ref 65–99)
POTASSIUM SERPL-SCNC: 4.3 MMOL/L (ref 3.5–5.2)
SODIUM SERPL-SCNC: 136 MMOL/L (ref 136–145)

## 2024-07-04 PROCEDURE — 63710000001 INSULIN LISPRO (HUMAN) PER 5 UNITS: Performed by: PHYSICIAN ASSISTANT

## 2024-07-04 PROCEDURE — 82948 REAGENT STRIP/BLOOD GLUCOSE: CPT

## 2024-07-04 PROCEDURE — 80048 BASIC METABOLIC PNL TOTAL CA: CPT | Performed by: PHYSICIAN ASSISTANT

## 2024-07-04 PROCEDURE — 82948 REAGENT STRIP/BLOOD GLUCOSE: CPT | Performed by: INTERNAL MEDICINE

## 2024-07-04 PROCEDURE — 63710000001 INSULIN GLARGINE PER 5 UNITS: Performed by: PHYSICIAN ASSISTANT

## 2024-07-04 RX ADMIN — LISINOPRIL 2.5 MG: 2.5 TABLET ORAL at 08:44

## 2024-07-04 RX ADMIN — OXYCODONE AND ACETAMINOPHEN 1 TABLET: 10; 325 TABLET ORAL at 03:17

## 2024-07-04 RX ADMIN — BACLOFEN 10 MG: 10 TABLET ORAL at 23:03

## 2024-07-04 RX ADMIN — BACLOFEN 10 MG: 10 TABLET ORAL at 03:17

## 2024-07-04 RX ADMIN — CYANOCOBALAMIN TAB 500 MCG 1000 MCG: 500 TAB at 08:43

## 2024-07-04 RX ADMIN — DICLOFENAC SODIUM 4 G: 10 GEL TOPICAL at 20:21

## 2024-07-04 RX ADMIN — PREGABALIN 100 MG: 100 CAPSULE ORAL at 08:43

## 2024-07-04 RX ADMIN — MONTELUKAST 10 MG: 10 TABLET, FILM COATED ORAL at 20:20

## 2024-07-04 RX ADMIN — INSULIN LISPRO 3 UNITS: 100 INJECTION, SOLUTION INTRAVENOUS; SUBCUTANEOUS at 11:54

## 2024-07-04 RX ADMIN — Medication 1 APPLICATION: at 23:03

## 2024-07-04 RX ADMIN — FUROSEMIDE 40 MG: 40 TABLET ORAL at 14:22

## 2024-07-04 RX ADMIN — ATORVASTATIN CALCIUM 10 MG: 10 TABLET, FILM COATED ORAL at 20:20

## 2024-07-04 RX ADMIN — PREGABALIN 100 MG: 100 CAPSULE ORAL at 20:20

## 2024-07-04 RX ADMIN — Medication 500 MG: at 20:21

## 2024-07-04 RX ADMIN — DICLOFENAC SODIUM 4 G: 10 GEL TOPICAL at 08:44

## 2024-07-04 RX ADMIN — SERTRALINE HYDROCHLORIDE 50 MG: 50 TABLET ORAL at 20:20

## 2024-07-04 RX ADMIN — APIXABAN 5 MG: 5 TABLET, FILM COATED ORAL at 20:20

## 2024-07-04 RX ADMIN — EMPAGLIFLOZIN 10 MG: 10 TABLET, FILM COATED ORAL at 08:44

## 2024-07-04 RX ADMIN — APIXABAN 5 MG: 5 TABLET, FILM COATED ORAL at 08:44

## 2024-07-04 RX ADMIN — INSULIN GLARGINE 20 UNITS: 100 INJECTION, SOLUTION SUBCUTANEOUS at 08:43

## 2024-07-04 RX ADMIN — BACLOFEN 10 MG: 10 TABLET ORAL at 14:22

## 2024-07-04 RX ADMIN — Medication 10 MG: at 20:20

## 2024-07-04 RX ADMIN — FUROSEMIDE 40 MG: 40 TABLET ORAL at 08:43

## 2024-07-04 RX ADMIN — IBUPROFEN 400 MG: 400 TABLET ORAL at 11:13

## 2024-07-04 RX ADMIN — CETIRIZINE HYDROCHLORIDE 10 MG: 10 TABLET, FILM COATED ORAL at 08:44

## 2024-07-04 RX ADMIN — INSULIN GLARGINE 22 UNITS: 100 INJECTION, SOLUTION SUBCUTANEOUS at 20:21

## 2024-07-04 RX ADMIN — ACETAMINOPHEN 650 MG: 325 TABLET ORAL at 09:51

## 2024-07-04 RX ADMIN — DICLOFENAC SODIUM 4 G: 10 GEL TOPICAL at 14:23

## 2024-07-04 RX ADMIN — PREGABALIN 100 MG: 100 CAPSULE ORAL at 17:09

## 2024-07-04 RX ADMIN — OXYCODONE AND ACETAMINOPHEN 1 TABLET: 10; 325 TABLET ORAL at 14:22

## 2024-07-04 RX ADMIN — Medication 500 MG: at 08:44

## 2024-07-04 RX ADMIN — ACETAMINOPHEN 650 MG: 325 TABLET ORAL at 20:20

## 2024-07-04 RX ADMIN — INSULIN LISPRO 5 UNITS: 100 INJECTION, SOLUTION INTRAVENOUS; SUBCUTANEOUS at 20:21

## 2024-07-04 RX ADMIN — OXYCODONE AND ACETAMINOPHEN 1 TABLET: 10; 325 TABLET ORAL at 23:03

## 2024-07-04 RX ADMIN — FERROUS SULFATE TAB 325 MG (65 MG ELEMENTAL FE) 325 MG: 325 (65 FE) TAB at 08:43

## 2024-07-04 NOTE — PLAN OF CARE
Goal Outcome Evaluation:  Plan of Care Reviewed With: patient        Progress: improving  Outcome Evaluation: No significant changes noted. AO x 4 and VS stable. medicated for pain x 2. he continues to state he wants an injection in his shoulder because of pain. Will continue with his plan of care. Call light and personal items within reach.

## 2024-07-05 LAB
GLUCOSE BLDC GLUCOMTR-MCNC: 111 MG/DL (ref 70–99)
GLUCOSE BLDC GLUCOMTR-MCNC: 115 MG/DL (ref 70–99)
GLUCOSE BLDC GLUCOMTR-MCNC: 186 MG/DL (ref 70–99)
GLUCOSE BLDC GLUCOMTR-MCNC: 213 MG/DL (ref 70–99)

## 2024-07-05 PROCEDURE — 82948 REAGENT STRIP/BLOOD GLUCOSE: CPT

## 2024-07-05 PROCEDURE — 63710000001 INSULIN GLARGINE PER 5 UNITS: Performed by: PHYSICIAN ASSISTANT

## 2024-07-05 PROCEDURE — 82948 REAGENT STRIP/BLOOD GLUCOSE: CPT | Performed by: INTERNAL MEDICINE

## 2024-07-05 PROCEDURE — 63710000001 INSULIN LISPRO (HUMAN) PER 5 UNITS: Performed by: PHYSICIAN ASSISTANT

## 2024-07-05 RX ADMIN — PREGABALIN 100 MG: 100 CAPSULE ORAL at 09:47

## 2024-07-05 RX ADMIN — DICLOFENAC SODIUM 4 G: 10 GEL TOPICAL at 07:43

## 2024-07-05 RX ADMIN — FUROSEMIDE 40 MG: 40 TABLET ORAL at 15:57

## 2024-07-05 RX ADMIN — DICLOFENAC SODIUM 4 G: 10 GEL TOPICAL at 15:00

## 2024-07-05 RX ADMIN — INSULIN GLARGINE 22 UNITS: 100 INJECTION, SOLUTION SUBCUTANEOUS at 20:24

## 2024-07-05 RX ADMIN — OXYCODONE AND ACETAMINOPHEN 1 TABLET: 10; 325 TABLET ORAL at 07:43

## 2024-07-05 RX ADMIN — Medication 500 MG: at 20:23

## 2024-07-05 RX ADMIN — DICLOFENAC SODIUM 4 G: 10 GEL TOPICAL at 03:00

## 2024-07-05 RX ADMIN — APIXABAN 5 MG: 5 TABLET, FILM COATED ORAL at 20:23

## 2024-07-05 RX ADMIN — Medication 1 APPLICATION: at 22:36

## 2024-07-05 RX ADMIN — OXYCODONE AND ACETAMINOPHEN 1 TABLET: 10; 325 TABLET ORAL at 15:57

## 2024-07-05 RX ADMIN — INSULIN GLARGINE 22 UNITS: 100 INJECTION, SOLUTION SUBCUTANEOUS at 09:48

## 2024-07-05 RX ADMIN — CYANOCOBALAMIN TAB 500 MCG 1000 MCG: 500 TAB at 09:47

## 2024-07-05 RX ADMIN — ATORVASTATIN CALCIUM 10 MG: 10 TABLET, FILM COATED ORAL at 20:23

## 2024-07-05 RX ADMIN — FUROSEMIDE 40 MG: 40 TABLET ORAL at 09:47

## 2024-07-05 RX ADMIN — CETIRIZINE HYDROCHLORIDE 10 MG: 10 TABLET, FILM COATED ORAL at 09:47

## 2024-07-05 RX ADMIN — Medication 500 MG: at 09:47

## 2024-07-05 RX ADMIN — LISINOPRIL 2.5 MG: 2.5 TABLET ORAL at 09:46

## 2024-07-05 RX ADMIN — IBUPROFEN 400 MG: 400 TABLET ORAL at 17:29

## 2024-07-05 RX ADMIN — Medication 10 MG: at 20:23

## 2024-07-05 RX ADMIN — INSULIN LISPRO 3 UNITS: 100 INJECTION, SOLUTION INTRAVENOUS; SUBCUTANEOUS at 20:24

## 2024-07-05 RX ADMIN — MONTELUKAST 10 MG: 10 TABLET, FILM COATED ORAL at 20:23

## 2024-07-05 RX ADMIN — PREGABALIN 100 MG: 100 CAPSULE ORAL at 20:23

## 2024-07-05 RX ADMIN — BACLOFEN 10 MG: 10 TABLET ORAL at 15:57

## 2024-07-05 RX ADMIN — EMPAGLIFLOZIN 10 MG: 10 TABLET, FILM COATED ORAL at 09:47

## 2024-07-05 RX ADMIN — BACLOFEN 10 MG: 10 TABLET ORAL at 07:44

## 2024-07-05 RX ADMIN — PREGABALIN 100 MG: 100 CAPSULE ORAL at 15:57

## 2024-07-05 RX ADMIN — SERTRALINE HYDROCHLORIDE 50 MG: 50 TABLET ORAL at 20:23

## 2024-07-05 RX ADMIN — FERROUS SULFATE TAB 325 MG (65 MG ELEMENTAL FE) 325 MG: 325 (65 FE) TAB at 08:19

## 2024-07-05 RX ADMIN — APIXABAN 5 MG: 5 TABLET, FILM COATED ORAL at 09:47

## 2024-07-05 RX ADMIN — IBUPROFEN 400 MG: 400 TABLET ORAL at 06:20

## 2024-07-05 RX ADMIN — DICLOFENAC SODIUM 4 G: 10 GEL TOPICAL at 20:26

## 2024-07-05 RX ADMIN — INSULIN LISPRO 5 UNITS: 100 INJECTION, SOLUTION INTRAVENOUS; SUBCUTANEOUS at 12:21

## 2024-07-05 NOTE — PLAN OF CARE
Goal Outcome Evaluation:  Plan of Care Reviewed With: patient        Progress: no change  Outcome Evaluation: Patient alert and oriented. C/O left hip pain and spasms at 0744 and again at 1557 and was given PRN Percocet and Baclofen. Meds effective each occurance. Given PRN Ibuprofen at 1729 for c/o left shoulder pain. Left arm elevated on pillows. Med and intervention somewhat effective. No significant changes this shift. Refused Aveeno moisturizer that was scheduled at 1000. Voices needs. Con't current POC.

## 2024-07-06 LAB
GLUCOSE BLDC GLUCOMTR-MCNC: 110 MG/DL (ref 70–99)
GLUCOSE BLDC GLUCOMTR-MCNC: 130 MG/DL (ref 70–99)
GLUCOSE BLDC GLUCOMTR-MCNC: 140 MG/DL (ref 70–99)
GLUCOSE BLDC GLUCOMTR-MCNC: 218 MG/DL (ref 70–99)
GLUCOSE BLDC GLUCOMTR-MCNC: 348 MG/DL (ref 70–99)

## 2024-07-06 PROCEDURE — 82948 REAGENT STRIP/BLOOD GLUCOSE: CPT | Performed by: INTERNAL MEDICINE

## 2024-07-06 PROCEDURE — 63710000001 INSULIN LISPRO (HUMAN) PER 5 UNITS: Performed by: PHYSICIAN ASSISTANT

## 2024-07-06 PROCEDURE — 63710000001 INSULIN GLARGINE PER 5 UNITS: Performed by: PHYSICIAN ASSISTANT

## 2024-07-06 PROCEDURE — 82948 REAGENT STRIP/BLOOD GLUCOSE: CPT

## 2024-07-06 RX ADMIN — Medication 500 MG: at 08:26

## 2024-07-06 RX ADMIN — IBUPROFEN 400 MG: 400 TABLET ORAL at 15:15

## 2024-07-06 RX ADMIN — Medication 1 APPLICATION: at 23:15

## 2024-07-06 RX ADMIN — CETIRIZINE HYDROCHLORIDE 10 MG: 10 TABLET, FILM COATED ORAL at 08:25

## 2024-07-06 RX ADMIN — DICLOFENAC SODIUM 4 G: 10 GEL TOPICAL at 08:25

## 2024-07-06 RX ADMIN — OXYCODONE AND ACETAMINOPHEN 1 TABLET: 10; 325 TABLET ORAL at 08:26

## 2024-07-06 RX ADMIN — PREGABALIN 100 MG: 100 CAPSULE ORAL at 08:26

## 2024-07-06 RX ADMIN — APIXABAN 5 MG: 5 TABLET, FILM COATED ORAL at 20:32

## 2024-07-06 RX ADMIN — ACETAMINOPHEN 650 MG: 325 TABLET ORAL at 04:41

## 2024-07-06 RX ADMIN — DICLOFENAC SODIUM 4 G: 10 GEL TOPICAL at 20:32

## 2024-07-06 RX ADMIN — DICLOFENAC SODIUM 4 G: 10 GEL TOPICAL at 02:54

## 2024-07-06 RX ADMIN — EMPAGLIFLOZIN 10 MG: 10 TABLET, FILM COATED ORAL at 08:25

## 2024-07-06 RX ADMIN — INSULIN LISPRO 5 UNITS: 100 INJECTION, SOLUTION INTRAVENOUS; SUBCUTANEOUS at 20:33

## 2024-07-06 RX ADMIN — MONTELUKAST 10 MG: 10 TABLET, FILM COATED ORAL at 20:32

## 2024-07-06 RX ADMIN — CYANOCOBALAMIN TAB 500 MCG 1000 MCG: 500 TAB at 08:26

## 2024-07-06 RX ADMIN — INSULIN GLARGINE 22 UNITS: 100 INJECTION, SOLUTION SUBCUTANEOUS at 20:33

## 2024-07-06 RX ADMIN — OXYCODONE AND ACETAMINOPHEN 1 TABLET: 10; 325 TABLET ORAL at 16:43

## 2024-07-06 RX ADMIN — FERROUS SULFATE TAB 325 MG (65 MG ELEMENTAL FE) 325 MG: 325 (65 FE) TAB at 08:25

## 2024-07-06 RX ADMIN — DICLOFENAC SODIUM 4 G: 10 GEL TOPICAL at 13:11

## 2024-07-06 RX ADMIN — PREGABALIN 100 MG: 100 CAPSULE ORAL at 20:32

## 2024-07-06 RX ADMIN — Medication 500 MG: at 20:32

## 2024-07-06 RX ADMIN — ACETAMINOPHEN 650 MG: 325 TABLET ORAL at 20:32

## 2024-07-06 RX ADMIN — BACLOFEN 10 MG: 10 TABLET ORAL at 16:43

## 2024-07-06 RX ADMIN — APIXABAN 5 MG: 5 TABLET, FILM COATED ORAL at 08:25

## 2024-07-06 RX ADMIN — BACLOFEN 10 MG: 10 TABLET ORAL at 00:04

## 2024-07-06 RX ADMIN — BACLOFEN 10 MG: 10 TABLET ORAL at 08:25

## 2024-07-06 RX ADMIN — OXYCODONE AND ACETAMINOPHEN 1 TABLET: 10; 325 TABLET ORAL at 00:04

## 2024-07-06 RX ADMIN — INSULIN GLARGINE 22 UNITS: 100 INJECTION, SOLUTION SUBCUTANEOUS at 08:26

## 2024-07-06 RX ADMIN — ATORVASTATIN CALCIUM 10 MG: 10 TABLET, FILM COATED ORAL at 20:32

## 2024-07-06 RX ADMIN — SERTRALINE HYDROCHLORIDE 50 MG: 50 TABLET ORAL at 20:32

## 2024-07-06 RX ADMIN — Medication 10 MG: at 20:32

## 2024-07-06 RX ADMIN — PREGABALIN 100 MG: 100 CAPSULE ORAL at 16:43

## 2024-07-06 NOTE — PLAN OF CARE
Goal Outcome Evaluation:  Plan of Care Reviewed With: patient        Progress: no change  Outcome Evaluation: Resident alert, oriented, able to make needs known to staff. remains bedbound, refused bath and legcare today. bloodglucose stable for all 3 meals. Resident doordashed for dinner. medicated for prn pain x2 with baclofen and percocet, x1 with ibuprofen. per resident medications only work for approx 2 hrs and then pain returns. incont of BM x1 this shift. Will continue current POC

## 2024-07-07 LAB
GLUCOSE BLDC GLUCOMTR-MCNC: 114 MG/DL (ref 70–99)
GLUCOSE BLDC GLUCOMTR-MCNC: 118 MG/DL (ref 70–99)
GLUCOSE BLDC GLUCOMTR-MCNC: 119 MG/DL (ref 70–99)
GLUCOSE BLDC GLUCOMTR-MCNC: 152 MG/DL (ref 70–99)

## 2024-07-07 PROCEDURE — 63710000001 INSULIN LISPRO (HUMAN) PER 5 UNITS: Performed by: PHYSICIAN ASSISTANT

## 2024-07-07 PROCEDURE — 82948 REAGENT STRIP/BLOOD GLUCOSE: CPT | Performed by: INTERNAL MEDICINE

## 2024-07-07 PROCEDURE — 63710000001 INSULIN GLARGINE PER 5 UNITS: Performed by: PHYSICIAN ASSISTANT

## 2024-07-07 PROCEDURE — 82948 REAGENT STRIP/BLOOD GLUCOSE: CPT

## 2024-07-07 RX ADMIN — INSULIN GLARGINE 22 UNITS: 100 INJECTION, SOLUTION SUBCUTANEOUS at 21:19

## 2024-07-07 RX ADMIN — APIXABAN 5 MG: 5 TABLET, FILM COATED ORAL at 08:54

## 2024-07-07 RX ADMIN — ATORVASTATIN CALCIUM 10 MG: 10 TABLET, FILM COATED ORAL at 21:19

## 2024-07-07 RX ADMIN — Medication 500 MG: at 08:54

## 2024-07-07 RX ADMIN — OXYCODONE AND ACETAMINOPHEN 1 TABLET: 10; 325 TABLET ORAL at 08:53

## 2024-07-07 RX ADMIN — IBUPROFEN 400 MG: 400 TABLET ORAL at 06:25

## 2024-07-07 RX ADMIN — MONTELUKAST 10 MG: 10 TABLET, FILM COATED ORAL at 21:19

## 2024-07-07 RX ADMIN — LISINOPRIL 2.5 MG: 2.5 TABLET ORAL at 08:54

## 2024-07-07 RX ADMIN — FERROUS SULFATE TAB 325 MG (65 MG ELEMENTAL FE) 325 MG: 325 (65 FE) TAB at 08:54

## 2024-07-07 RX ADMIN — Medication 10 MG: at 21:19

## 2024-07-07 RX ADMIN — OXYCODONE AND ACETAMINOPHEN 1 TABLET: 10; 325 TABLET ORAL at 00:53

## 2024-07-07 RX ADMIN — INSULIN LISPRO 3 UNITS: 100 INJECTION, SOLUTION INTRAVENOUS; SUBCUTANEOUS at 21:19

## 2024-07-07 RX ADMIN — CYANOCOBALAMIN TAB 500 MCG 1000 MCG: 500 TAB at 08:54

## 2024-07-07 RX ADMIN — BACLOFEN 10 MG: 10 TABLET ORAL at 08:54

## 2024-07-07 RX ADMIN — DICLOFENAC SODIUM 4 G: 10 GEL TOPICAL at 02:57

## 2024-07-07 RX ADMIN — SERTRALINE HYDROCHLORIDE 50 MG: 50 TABLET ORAL at 21:19

## 2024-07-07 RX ADMIN — CETIRIZINE HYDROCHLORIDE 10 MG: 10 TABLET, FILM COATED ORAL at 08:54

## 2024-07-07 RX ADMIN — APIXABAN 5 MG: 5 TABLET, FILM COATED ORAL at 21:19

## 2024-07-07 RX ADMIN — EMPAGLIFLOZIN 10 MG: 10 TABLET, FILM COATED ORAL at 08:54

## 2024-07-07 RX ADMIN — INSULIN GLARGINE 22 UNITS: 100 INJECTION, SOLUTION SUBCUTANEOUS at 08:55

## 2024-07-07 RX ADMIN — FUROSEMIDE 40 MG: 40 TABLET ORAL at 08:54

## 2024-07-07 RX ADMIN — DICLOFENAC SODIUM 4 G: 10 GEL TOPICAL at 08:57

## 2024-07-07 RX ADMIN — OXYCODONE AND ACETAMINOPHEN 1 TABLET: 10; 325 TABLET ORAL at 17:20

## 2024-07-07 RX ADMIN — PREGABALIN 100 MG: 100 CAPSULE ORAL at 21:19

## 2024-07-07 RX ADMIN — PREGABALIN 100 MG: 100 CAPSULE ORAL at 08:54

## 2024-07-07 RX ADMIN — Medication 1 APPLICATION: at 12:08

## 2024-07-07 RX ADMIN — Medication 500 MG: at 21:19

## 2024-07-07 RX ADMIN — BACLOFEN 10 MG: 10 TABLET ORAL at 00:53

## 2024-07-07 RX ADMIN — BACLOFEN 10 MG: 10 TABLET ORAL at 17:21

## 2024-07-07 RX ADMIN — DICLOFENAC SODIUM 4 G: 10 GEL TOPICAL at 14:51

## 2024-07-07 RX ADMIN — ACETAMINOPHEN 650 MG: 325 TABLET ORAL at 03:14

## 2024-07-07 RX ADMIN — PREGABALIN 100 MG: 100 CAPSULE ORAL at 17:20

## 2024-07-07 RX ADMIN — DICLOFENAC SODIUM 4 G: 10 GEL TOPICAL at 21:19

## 2024-07-07 NOTE — PLAN OF CARE
Goal Outcome Evaluation:  Plan of Care Reviewed With: patient        Progress: no change  Outcome Evaluation: Patient alert and oriented x4 able to make needs known. Bedbound and does not transfer with nursing staff. Patient blood sugars wnl today during all checks. Wound care done as ordered. Pain medication and baclofen given x2 this shift. Continue current POC

## 2024-07-08 LAB
GLUCOSE BLDC GLUCOMTR-MCNC: 126 MG/DL (ref 70–99)
GLUCOSE BLDC GLUCOMTR-MCNC: 126 MG/DL (ref 70–99)
GLUCOSE BLDC GLUCOMTR-MCNC: 150 MG/DL (ref 70–99)
GLUCOSE BLDC GLUCOMTR-MCNC: 206 MG/DL (ref 70–99)

## 2024-07-08 PROCEDURE — 63710000001 INSULIN GLARGINE PER 5 UNITS: Performed by: PHYSICIAN ASSISTANT

## 2024-07-08 PROCEDURE — 82948 REAGENT STRIP/BLOOD GLUCOSE: CPT | Performed by: INTERNAL MEDICINE

## 2024-07-08 PROCEDURE — 82948 REAGENT STRIP/BLOOD GLUCOSE: CPT

## 2024-07-08 PROCEDURE — 63710000001 INSULIN LISPRO (HUMAN) PER 5 UNITS: Performed by: PHYSICIAN ASSISTANT

## 2024-07-08 RX ADMIN — FERROUS SULFATE TAB 325 MG (65 MG ELEMENTAL FE) 325 MG: 325 (65 FE) TAB at 08:36

## 2024-07-08 RX ADMIN — Medication 10 MG: at 20:11

## 2024-07-08 RX ADMIN — INSULIN GLARGINE 22 UNITS: 100 INJECTION, SOLUTION SUBCUTANEOUS at 08:36

## 2024-07-08 RX ADMIN — BACLOFEN 10 MG: 10 TABLET ORAL at 23:46

## 2024-07-08 RX ADMIN — Medication 500 MG: at 08:36

## 2024-07-08 RX ADMIN — DICLOFENAC SODIUM 4 G: 10 GEL TOPICAL at 01:15

## 2024-07-08 RX ADMIN — DICLOFENAC SODIUM 4 G: 10 GEL TOPICAL at 16:51

## 2024-07-08 RX ADMIN — PREGABALIN 100 MG: 100 CAPSULE ORAL at 20:11

## 2024-07-08 RX ADMIN — DICLOFENAC SODIUM 4 G: 10 GEL TOPICAL at 20:11

## 2024-07-08 RX ADMIN — OXYCODONE AND ACETAMINOPHEN 1 TABLET: 10; 325 TABLET ORAL at 23:46

## 2024-07-08 RX ADMIN — CYANOCOBALAMIN TAB 500 MCG 1000 MCG: 500 TAB at 08:36

## 2024-07-08 RX ADMIN — PREGABALIN 100 MG: 100 CAPSULE ORAL at 08:36

## 2024-07-08 RX ADMIN — ACETAMINOPHEN 650 MG: 325 TABLET ORAL at 01:15

## 2024-07-08 RX ADMIN — INSULIN GLARGINE 22 UNITS: 100 INJECTION, SOLUTION SUBCUTANEOUS at 20:13

## 2024-07-08 RX ADMIN — BACLOFEN 10 MG: 10 TABLET ORAL at 03:11

## 2024-07-08 RX ADMIN — APIXABAN 5 MG: 5 TABLET, FILM COATED ORAL at 08:36

## 2024-07-08 RX ADMIN — DICLOFENAC SODIUM 4 G: 10 GEL TOPICAL at 08:37

## 2024-07-08 RX ADMIN — PREGABALIN 100 MG: 100 CAPSULE ORAL at 16:50

## 2024-07-08 RX ADMIN — Medication 500 MG: at 20:11

## 2024-07-08 RX ADMIN — ACETAMINOPHEN 650 MG: 325 TABLET ORAL at 21:35

## 2024-07-08 RX ADMIN — MONTELUKAST 10 MG: 10 TABLET, FILM COATED ORAL at 20:12

## 2024-07-08 RX ADMIN — CETIRIZINE HYDROCHLORIDE 10 MG: 10 TABLET, FILM COATED ORAL at 08:36

## 2024-07-08 RX ADMIN — EMPAGLIFLOZIN 10 MG: 10 TABLET, FILM COATED ORAL at 08:36

## 2024-07-08 RX ADMIN — INSULIN LISPRO 3 UNITS: 100 INJECTION, SOLUTION INTRAVENOUS; SUBCUTANEOUS at 12:15

## 2024-07-08 RX ADMIN — BACLOFEN 10 MG: 10 TABLET ORAL at 11:14

## 2024-07-08 RX ADMIN — OXYCODONE AND ACETAMINOPHEN 1 TABLET: 10; 325 TABLET ORAL at 03:11

## 2024-07-08 RX ADMIN — OXYCODONE AND ACETAMINOPHEN 1 TABLET: 10; 325 TABLET ORAL at 11:14

## 2024-07-08 RX ADMIN — APIXABAN 5 MG: 5 TABLET, FILM COATED ORAL at 20:12

## 2024-07-08 RX ADMIN — SERTRALINE HYDROCHLORIDE 50 MG: 50 TABLET ORAL at 20:12

## 2024-07-08 RX ADMIN — INSULIN LISPRO 5 UNITS: 100 INJECTION, SOLUTION INTRAVENOUS; SUBCUTANEOUS at 20:12

## 2024-07-08 RX ADMIN — ATORVASTATIN CALCIUM 10 MG: 10 TABLET, FILM COATED ORAL at 20:12

## 2024-07-08 NOTE — PLAN OF CARE
Goal Outcome Evaluation:  Plan of Care Reviewed With: patient        Progress: no change  Outcome Evaluation: Resident alert, oriented, able to make needs known to staff. Remains bedbound. Did get up for daily wt. today but returned to bed. medicated with prn Baclofen and Percocet x1, effective. [facility] in [place] tomorrow with orthopedic surgeon for possible hip replacement. blood glucose stable for all 3 meals. refused skin care to BLLE today. uses trapeze bar to shift wt, however does not turn side to side to off-load. cont. current plan of care

## 2024-07-08 NOTE — PLAN OF CARE
Goal Outcome Evaluation:  Plan of Care Reviewed With: patient        Progress: no change  Outcome Evaluation: Alert and oriented x4; able to make needs known to staff. Able to use urinal independently; calls for bedpan with episodes of incontinence. Tylenol, Baclofen and Percocet administered for left shoulder and hip pain per MAR. Continues to refuse turns but uses trapeze to shift weight. Blood sugar 152 at bedtime; insulin administered per MAR. Refused skin care. Call light within reach; care plan ongoing.

## 2024-07-09 LAB
GLUCOSE BLDC GLUCOMTR-MCNC: 114 MG/DL (ref 70–99)
GLUCOSE BLDC GLUCOMTR-MCNC: 191 MG/DL (ref 70–99)
GLUCOSE BLDC GLUCOMTR-MCNC: 79 MG/DL (ref 70–99)

## 2024-07-09 PROCEDURE — 82948 REAGENT STRIP/BLOOD GLUCOSE: CPT | Performed by: INTERNAL MEDICINE

## 2024-07-09 PROCEDURE — 82948 REAGENT STRIP/BLOOD GLUCOSE: CPT

## 2024-07-09 PROCEDURE — 63710000001 INSULIN LISPRO (HUMAN) PER 5 UNITS: Performed by: PHYSICIAN ASSISTANT

## 2024-07-09 PROCEDURE — 63710000001 INSULIN GLARGINE PER 5 UNITS: Performed by: PHYSICIAN ASSISTANT

## 2024-07-09 PROCEDURE — 82948 REAGENT STRIP/BLOOD GLUCOSE: CPT | Performed by: PHYSICIAN ASSISTANT

## 2024-07-09 RX ADMIN — DICLOFENAC SODIUM 4 G: 10 GEL TOPICAL at 19:54

## 2024-07-09 RX ADMIN — CYANOCOBALAMIN TAB 500 MCG 1000 MCG: 500 TAB at 08:44

## 2024-07-09 RX ADMIN — INSULIN LISPRO 3 UNITS: 100 INJECTION, SOLUTION INTRAVENOUS; SUBCUTANEOUS at 20:34

## 2024-07-09 RX ADMIN — SERTRALINE HYDROCHLORIDE 50 MG: 50 TABLET ORAL at 20:29

## 2024-07-09 RX ADMIN — DICLOFENAC SODIUM 4 G: 10 GEL TOPICAL at 08:46

## 2024-07-09 RX ADMIN — BACLOFEN 10 MG: 10 TABLET ORAL at 19:52

## 2024-07-09 RX ADMIN — PREGABALIN 100 MG: 100 CAPSULE ORAL at 20:29

## 2024-07-09 RX ADMIN — EMPAGLIFLOZIN 10 MG: 10 TABLET, FILM COATED ORAL at 08:45

## 2024-07-09 RX ADMIN — BACLOFEN 10 MG: 10 TABLET ORAL at 11:10

## 2024-07-09 RX ADMIN — ATORVASTATIN CALCIUM 10 MG: 10 TABLET, FILM COATED ORAL at 20:29

## 2024-07-09 RX ADMIN — APIXABAN 5 MG: 5 TABLET, FILM COATED ORAL at 20:28

## 2024-07-09 RX ADMIN — OXYCODONE AND ACETAMINOPHEN 1 TABLET: 10; 325 TABLET ORAL at 11:10

## 2024-07-09 RX ADMIN — OXYCODONE AND ACETAMINOPHEN 1 TABLET: 10; 325 TABLET ORAL at 19:53

## 2024-07-09 RX ADMIN — CETIRIZINE HYDROCHLORIDE 10 MG: 10 TABLET, FILM COATED ORAL at 08:45

## 2024-07-09 RX ADMIN — APIXABAN 5 MG: 5 TABLET, FILM COATED ORAL at 08:44

## 2024-07-09 RX ADMIN — INSULIN GLARGINE 22 UNITS: 100 INJECTION, SOLUTION SUBCUTANEOUS at 20:35

## 2024-07-09 RX ADMIN — PREGABALIN 100 MG: 100 CAPSULE ORAL at 16:36

## 2024-07-09 RX ADMIN — PREGABALIN 100 MG: 100 CAPSULE ORAL at 08:44

## 2024-07-09 RX ADMIN — FERROUS SULFATE TAB 325 MG (65 MG ELEMENTAL FE) 325 MG: 325 (65 FE) TAB at 08:45

## 2024-07-09 RX ADMIN — ACETAMINOPHEN 650 MG: 325 TABLET ORAL at 05:12

## 2024-07-09 RX ADMIN — MONTELUKAST 10 MG: 10 TABLET, FILM COATED ORAL at 20:28

## 2024-07-09 RX ADMIN — Medication 10 MG: at 20:28

## 2024-07-09 RX ADMIN — IBUPROFEN 400 MG: 400 TABLET ORAL at 18:34

## 2024-07-09 NOTE — PLAN OF CARE
Goal Outcome Evaluation:  Plan of Care Reviewed With: patient        Progress: improving  Outcome Evaluation: Pt is AO x 4 and VSS. No significant changes this shift. He was medicated x 2 for pain this shift.  Plans are for him to go to [facility] today. Will continue with his plan of care. Call light and personal items within reach.

## 2024-07-09 NOTE — PLAN OF CARE
Goal Outcome Evaluation:  Plan of Care Reviewed With: patient        Progress: no change  Outcome Evaluation: Resident alert, oriented, able to make needs known to staff. continues to be bedbound. will get up once a week for weekly wt. Had appt in [place] today, orthopedic surgeon, Dr. Wong. Returned with no order, no office notes. Call placed to office, staff stated notes are not written yet, will be finished tomorrow and will fax to us. Medicated for prn pain prior to ambulance ride to [place]. Medicated with prn Ibuprofen after dinner for prn pain. Blood glucose stable for BF and dinner, [facility] for noon blood glucose. several meds withheld this am per request to include, Lasix, pro-biotic, Lantus, Lisinopril. /continue current POC

## 2024-07-10 LAB
GLUCOSE BLDC GLUCOMTR-MCNC: 113 MG/DL (ref 70–99)
GLUCOSE BLDC GLUCOMTR-MCNC: 121 MG/DL (ref 70–99)
GLUCOSE BLDC GLUCOMTR-MCNC: 136 MG/DL (ref 70–99)
GLUCOSE BLDC GLUCOMTR-MCNC: 147 MG/DL (ref 70–99)

## 2024-07-10 PROCEDURE — 63710000001 INSULIN GLARGINE PER 5 UNITS: Performed by: PHYSICIAN ASSISTANT

## 2024-07-10 PROCEDURE — 82948 REAGENT STRIP/BLOOD GLUCOSE: CPT | Performed by: PHYSICIAN ASSISTANT

## 2024-07-10 PROCEDURE — 82948 REAGENT STRIP/BLOOD GLUCOSE: CPT

## 2024-07-10 RX ADMIN — LISINOPRIL 2.5 MG: 2.5 TABLET ORAL at 08:41

## 2024-07-10 RX ADMIN — FUROSEMIDE 40 MG: 40 TABLET ORAL at 08:41

## 2024-07-10 RX ADMIN — APIXABAN 5 MG: 5 TABLET, FILM COATED ORAL at 20:00

## 2024-07-10 RX ADMIN — INSULIN GLARGINE 22 UNITS: 100 INJECTION, SOLUTION SUBCUTANEOUS at 20:00

## 2024-07-10 RX ADMIN — ACETAMINOPHEN 650 MG: 325 TABLET ORAL at 22:32

## 2024-07-10 RX ADMIN — MONTELUKAST 10 MG: 10 TABLET, FILM COATED ORAL at 20:00

## 2024-07-10 RX ADMIN — APIXABAN 5 MG: 5 TABLET, FILM COATED ORAL at 08:41

## 2024-07-10 RX ADMIN — IBUPROFEN 400 MG: 400 TABLET ORAL at 07:38

## 2024-07-10 RX ADMIN — DICLOFENAC SODIUM 4 G: 10 GEL TOPICAL at 08:42

## 2024-07-10 RX ADMIN — DICLOFENAC SODIUM 4 G: 10 GEL TOPICAL at 19:46

## 2024-07-10 RX ADMIN — DICLOFENAC SODIUM 4 G: 10 GEL TOPICAL at 13:27

## 2024-07-10 RX ADMIN — ATORVASTATIN CALCIUM 10 MG: 10 TABLET, FILM COATED ORAL at 20:00

## 2024-07-10 RX ADMIN — CETIRIZINE HYDROCHLORIDE 10 MG: 10 TABLET, FILM COATED ORAL at 08:41

## 2024-07-10 RX ADMIN — BACLOFEN 10 MG: 10 TABLET ORAL at 12:11

## 2024-07-10 RX ADMIN — OXYCODONE AND ACETAMINOPHEN 1 TABLET: 10; 325 TABLET ORAL at 20:00

## 2024-07-10 RX ADMIN — FERROUS SULFATE TAB 325 MG (65 MG ELEMENTAL FE) 325 MG: 325 (65 FE) TAB at 08:41

## 2024-07-10 RX ADMIN — OXYCODONE AND ACETAMINOPHEN 1 TABLET: 10; 325 TABLET ORAL at 04:34

## 2024-07-10 RX ADMIN — PREGABALIN 100 MG: 100 CAPSULE ORAL at 16:57

## 2024-07-10 RX ADMIN — PREGABALIN 100 MG: 100 CAPSULE ORAL at 08:41

## 2024-07-10 RX ADMIN — OXYCODONE AND ACETAMINOPHEN 1 TABLET: 10; 325 TABLET ORAL at 12:11

## 2024-07-10 RX ADMIN — BACLOFEN 10 MG: 10 TABLET ORAL at 20:00

## 2024-07-10 RX ADMIN — CYANOCOBALAMIN TAB 500 MCG 1000 MCG: 500 TAB at 08:41

## 2024-07-10 RX ADMIN — BACLOFEN 10 MG: 10 TABLET ORAL at 04:35

## 2024-07-10 RX ADMIN — SERTRALINE HYDROCHLORIDE 50 MG: 50 TABLET ORAL at 20:00

## 2024-07-10 RX ADMIN — Medication 1 APPLICATION: at 12:11

## 2024-07-10 RX ADMIN — PREGABALIN 100 MG: 100 CAPSULE ORAL at 20:00

## 2024-07-10 RX ADMIN — EMPAGLIFLOZIN 10 MG: 10 TABLET, FILM COATED ORAL at 08:42

## 2024-07-10 RX ADMIN — Medication 10 MG: at 20:00

## 2024-07-10 RX ADMIN — INSULIN GLARGINE 22 UNITS: 100 INJECTION, SOLUTION SUBCUTANEOUS at 08:42

## 2024-07-10 NOTE — PLAN OF CARE
Goal Outcome Evaluation:  Plan of Care Reviewed With: patient        Progress: no change  Outcome Evaluation: Resident alert, oriented, able to make needs known to staff. Remains bedbound. Blood glucose stable for all 3 meals. probiotic discontinued today per resident request. New orders reveived for podiatry consult r/t thick onchymiotic nails. Medicated with prn Ibuprofen this am, medicated for prn pain with percocet and baclofen ant lunchtime, effective. Received ozempic injection this evening. Resident pleasant and cooperative.

## 2024-07-10 NOTE — PLAN OF CARE
Goal Outcome Evaluation:  Plan of Care Reviewed With: patient        Progress: improving  Outcome Evaluation: Pt AO x 4 and VSS. He would like to talk to our ortho physicians about doing hip surgery at [facility]. He was medicated x 2 for pain and discomfort.  Blood glucose aty151 at bedtime. Will continue to monitor his blood sugar. He refused his probiotic tonight. Continue with his plan of care. Call light and personal items within reach.

## 2024-07-10 NOTE — CONSULTS
"Nutrition Services    Patient Name: Jose Shaikh  YOB: 1960  MRN: [id number]  Admission date: 11/6/2023      CLINICAL NUTRITION ASSESSMENT      Reason for Assessment  Follow Up   H&P:  Past Medical History:   Diagnosis Date    Absence of toe of right foot     Acute osteomyelitis of left calcaneus  08/18/2021    Anxiety and depression     Arthritis     Cancer     Chronic pain     STATES HIS PAIN IS 10/10 AAT    Claustrophobia     Corns and callus     Diabetic ulcer of left heel associated with type 2 DM 08/18/2021    Diabetic ulcer of left heel associated with type 2 DM 07/06/2021    Diabetic ulcer of right midfoot associated with type 2 DM 08/18/2021    Difficulty walking     Essential hypertension 08/31/2021    Hammertoe     Hyperlipidemia LDL goal <100 08/31/2021    Ingrown toenail     Obesity     Paroxysmal atrial fibrillation 08/31/2021    Polyneuropathy     Pressure ulcer, stage 1     Tinea unguium     Type 2 diabetes mellitus with polyneuropathy         Current Problems:   Active Hospital Problems    Diagnosis     **Debility     Ulcerated, foot, left, limited to breakdown of skin     Onychomycosis     Ulcer of right foot         Nutrition/Diet History         Narrative   Patient remains on a low Na+, high protein diet. Per chart graphics, patient continues with adequate po intake evidenced by % meal intake. +BM today. Continue with current nutrition intervention.    RD will continue to monitor per protocol.         Anthropometrics        Current Height, Weight Height: 188 cm (74.02\")  Weight: (!) 156 kg (343 lb 14.7 oz)   Current BMI Body mass index is 44.14 kg/m².   BMI Classification Obese Class III   % %   Adjusted Body Weight (ABW) 112 kg   Weight Hx  Wt Readings from Last 30 Encounters:   07/08/24 0900 (!) 156 kg (343 lb 14.7 oz)   06/27/24 0745 (!) 156 kg (343 lb 4.1 oz)   05/31/24 1400 (!) 151 kg (333 lb 12.4 oz)   05/22/24 1000 (!) 157 kg (346 lb 2 oz)   05/15/24 0545 (!) " 153 kg (338 lb 3 oz)   05/08/24 1100 (!) 154 kg (340 lb 6.2 oz)   05/08/24 1029 (!) 154 kg (340 lb 6.2 oz)   05/01/24 1100 (!) 156 kg (343 lb 14.7 oz)   04/24/24 0900 (!) 159 kg (350 lb 1.5 oz)   04/17/24 1124 (!) 161 kg (355 lb 9.6 oz)   04/11/24 1509 (!) 162 kg (356 lb 11.3 oz)   04/02/24 1118 (!) 157 kg (345 lb 0.3 oz)   03/26/24 1003 (!) 143 kg (314 lb 2.5 oz)   03/18/24 1323 (!) 151 kg (332 lb 3.7 oz)   03/18/24 0500 (!) 171 kg (378 lb 1.4 oz)   03/17/24 0500 (!) 173 kg (380 lb 15.3 oz)   03/16/24 0500 (!) 171 kg (376 lb 15.8 oz)   03/15/24 0500 (!) 161 kg (354 lb 8 oz)   03/14/24 0300 (!) 173 kg (382 lb 4.4 oz)   03/12/24 0500 (!) 158 kg (347 lb 14.2 oz)   03/11/24 0500 (!) 153 kg (337 lb 8.4 oz)   03/10/24 0540 (!) 154 kg (339 lb 4.6 oz)   03/09/24 0500 (!) 153 kg (337 lb 1.3 oz)   03/08/24 1323 (!) 153 kg (337 lb 8 oz)   03/08/24 0500 (!) 157 kg (346 lb 2 oz)   03/06/24 2300 (!) 155 kg (342 lb 2.5 oz)   03/05/24 0415 (!) 155 kg (342 lb 13 oz)   03/04/24 0500 (!) 156 kg (344 lb 9.3 oz)   03/03/24 0500 (!) 156 kg (344 lb 9.3 oz)   03/02/24 0530 (!) 157 kg (346 lb 12.5 oz)   03/01/24 0500 (!) 157 kg (345 lb 3.9 oz)   02/29/24 0500 (!) 157 kg (347 lb 3.6 oz)   02/28/24 0509 (!) 158 kg (347 lb 14.2 oz)   02/27/24 0500 (!) 159 kg (351 lb 3.1 oz)   02/26/24 0500 (!) 159 kg (350 lb 12 oz)   02/25/24 0500 (!) 160 kg (351 lb 10.1 oz)   02/24/24 0500 (!) 160 kg (352 lb 1.2 oz)   02/23/24 0500 (!) 160 kg (353 lb 6.4 oz)   02/22/24 0500 (!) 160 kg (353 lb 13.4 oz)   02/21/24 0514 (!) 162 kg (356 lb 7.7 oz)   02/20/24 0500 (!) 161 kg (355 lb 2.6 oz)   02/19/24 0300 (!) 160 kg (353 lb 6.4 oz)   02/18/24 0500 (!) 162 kg (356 lb 7.7 oz)   02/17/24 0500 (!) 162 kg (357 lb 2.3 oz)   02/16/24 0500 (!) 162 kg (358 lb 0.4 oz)   02/15/24 0532 (!) 163 kg (360 lb 3.7 oz)   02/14/24 0600 (!) 162 kg (357 lb 5.9 oz)   02/13/24 0500 (!) 162 kg (357 lb 9.4 oz)   02/12/24 1352 (!) 160 kg (352 lb 4.7 oz)   02/12/24 0500 (!) 166 kg (367 lb  1.1 oz)   02/11/24 0500 (!) 169 kg (372 lb 2.2 oz)   02/10/24 0500 (!) 168 kg (370 lb 9.5 oz)   02/09/24 0600 (!) 168 kg (370 lb 9.5 oz)   02/08/24 0600 (!) 165 kg (363 lb 15.7 oz)   02/07/24 0600 (!) 166 kg (366 lb 10 oz)   02/06/24 0419 (!) 166 kg (366 lb 10 oz)   02/05/24 0500 (!) 167 kg (367 lb 4.6 oz)   02/04/24 0500 (!) 167 kg (367 lb 8.1 oz)   02/03/24 0500 (!) 166 kg (366 lb 6.5 oz)   02/02/24 0500 (!) 168 kg (369 lb 14.9 oz)   02/01/24 0613 (!) 169 kg (372 lb 5.7 oz)   01/31/24 0500 (!) 168 kg (371 lb 4.1 oz)   01/30/24 0500 (!) 169 kg (372 lb 12.8 oz)   01/29/24 0232 (!) 169 kg (372 lb 5.7 oz)   01/28/24 0500 (!) 167 kg (368 lb 6.2 oz)   01/26/24 0400 (!) 169 kg (372 lb 2.2 oz)   01/25/24 0417 (!) 167 kg (368 lb 9.8 oz)   01/24/24 0608 (!) 168 kg (371 lb 7.6 oz)   01/23/24 0500 (!) 168 kg (369 lb 14.9 oz)   01/22/24 0500 (!) 168 kg (371 lb 4.1 oz)   01/21/24 0500 (!) 168 kg (371 lb 4.1 oz)   01/20/24 0500 (!) 168 kg (371 lb 7.6 oz)   01/19/24 0500 (!) 171 kg (376 lb 1.7 oz)   01/18/24 0500 (!) 172 kg (380 lb 1.2 oz)   01/17/24 0614 (!) 172 kg (379 lb 6.6 oz)   01/16/24 0500 (!) 171 kg (378 lb 1.4 oz)   01/15/24 0500 (!) 169 kg (372 lb 2.2 oz)   01/14/24 0546 (!) 169 kg (373 lb 7.4 oz)   01/13/24 0507 (!) 171 kg (376 lb 5.2 oz)   01/12/24 0600 (!) 170 kg (374 lb 12.5 oz)   01/11/24 0600 (!) 169 kg (371 lb 14.7 oz)   01/10/24 0600 (!) 169 kg (371 lb 11.1 oz)   01/09/24 0500 (!) 168 kg (370 lb 9.5 oz)   01/08/24 0448 (!) 168 kg (370 lb 9.5 oz)   01/07/24 0454 (!) 167 kg (367 lb 15.2 oz)   01/06/24 0500 (!) 166 kg (366 lb 10 oz)   01/05/24 0555 (!) 165 kg (364 lb 6.7 oz)   01/04/24 0500 (!) 165 kg (363 lb 12.1 oz)   01/03/24 0500 (!) 166 kg (365 lb 1.3 oz)   01/02/24 0500 (!) 167 kg (367 lb 4.6 oz)   01/01/24 0500 (!) 166 kg (365 lb 11.9 oz)   12/31/23 0500 (!) 160 kg (352 lb 4.7 oz)   12/30/23 0500 (!) 167 kg (367 lb 15.2 oz)   12/29/23 0500 (!) 166 kg (366 lb 10 oz)   12/28/23 0500 (!) 165 kg (364 lb 13.8  oz)   12/27/23 0500 (!) 166 kg (367 lb 1.1 oz)   12/26/23 0500 (!) 167 kg (367 lb 4.6 oz)   12/25/23 0500 (!) 165 kg (364 lb 3.2 oz)   12/24/23 0500 (!) 164 kg (362 lb 7 oz)   12/23/23 0500 (!) 163 kg (360 lb 0.2 oz)   12/22/23 0600 (!) 163 kg (358 lb 14.5 oz)   12/21/23 0500 (!) 163 kg (359 lb 12.7 oz)   12/20/23 0500 (!) 162 kg (357 lb 9.4 oz)   12/19/23 0550 (!) 163 kg (359 lb 5.6 oz)   12/18/23 0500 (!) 161 kg (355 lb 13.2 oz)   12/17/23 0500 (!) 160 kg (353 lb 13.4 oz)   12/16/23 1541 (!) 160 kg (353 lb 3.2 oz)   12/16/23 0500 (!) 165 kg (364 lb 13.8 oz)   12/15/23 0500 (!) 165 kg (362 lb 10.5 oz)   12/14/23 0500 (!) 157 kg (345 lb 14.4 oz)   12/13/23 0500 (!) 153 kg (337 lb 4.9 oz)   12/12/23 0500 (!) 155 kg (341 lb 0.8 oz)   12/11/23 1049 (!) 155 kg (341 lb 0.8 oz)   12/11/23 0500 (!) 160 kg (353 lb 13.4 oz)   12/10/23 0532 (!) 162 kg (356 lb 7.7 oz)   12/09/23 0500 (!) 151 kg (332 lb 10.8 oz)   12/08/23 0500 (!) 153 kg (336 lb 13.8 oz)   12/06/23 0500 (!) 154 kg (340 lb 6.2 oz)   12/05/23 0607 (!) 161 kg (354 lb 15.1 oz)   12/04/23 0500 (!) 161 kg (354 lb 4.5 oz)   12/03/23 0400 (!) 161 kg (354 lb 11.5 oz)   11/21/23 1155 (!) 162 kg (357 lb 12.8 oz)   11/09/23 0500 (!) 160 kg (353 lb 9.9 oz)   11/06/23 1422 (!) 158 kg (348 lb 5.2 oz)   11/02/23 1155 (!) 158 kg (348 lb 15.8 oz)   09/11/23 0030 (!) 151 kg (334 lb)   09/10/23 1844 (!) 154 kg (338 lb 10 oz)   08/30/23 1112 (!) 157 kg (347 lb)   08/01/23 0932 (!) 158 kg (347 lb 10.7 oz)   07/31/23 2347 (!) 163 kg (358 lb 7.5 oz)   06/16/23 2333 (!) 155 kg (342 lb 2.5 oz)   06/16/23 1053 (!) 155 kg (342 lb 3.2 oz)   06/15/23 0505 (!) 149 kg (328 lb 14.4 oz)   06/14/23 0455 (!) 149 kg (328 lb 4.8 oz)   06/13/23 1703 (!) 149 kg (328 lb 11.2 oz)   06/13/23 0600 (!) 170 kg (374 lb 12.5 oz)   06/12/23 0420 (!) 160 kg (352 lb 11.8 oz)   06/11/23 0447 (!) 150 kg (331 lb 5.6 oz)   06/10/23 0500 (!) 150 kg (330 lb 14.6 oz)   06/09/23 0536 (!) 156 kg (343 lb 7.6 oz)    06/08/23 1826 (!) 156 kg (344 lb 11.2 oz)   06/08/23 0522 (!) 158 kg (348 lb 8.8 oz)   06/07/23 0548 (!) 155 kg (342 lb 9.5 oz)   06/06/23 0515 (!) 155 kg (341 lb 14.9 oz)   06/05/23 0445 (!) 155 kg (341 lb 9.6 oz)   06/05/23 0348 (!) 158 kg (349 lb 3.3 oz)   06/04/23 0555 (!) 157 kg (346 lb 3.2 oz)   06/03/23 0539 (!) 158 kg (349 lb)   06/02/23 0548 (!) 163 kg (359 lb 3.2 oz)   06/01/23 0600 (!) 164 kg (362 lb)   05/31/23 0507 (!) 164 kg (362 lb 4.8 oz)   05/30/23 0635 (!) 164 kg (362 lb 7 oz)   05/29/23 0500 (!) 167 kg (368 lb 2.7 oz)   05/28/23 0600 (!) 167 kg (367 lb 11.6 oz)   05/27/23 0600 (!) 167 kg (369 lb 0.8 oz)   05/26/23 0529 (!) 167 kg (369 lb 3.2 oz)   05/25/23 0600 (!) 168 kg (370 lb 3.2 oz)   05/24/23 0600 (!) 166 kg (366 lb 9.6 oz)   05/22/23 0529 (!) 169 kg (373 lb 7.4 oz)   05/21/23 0600 (!) 169 kg (371 lb 12.8 oz)   05/20/23 0600 (!) 171 kg (376 lb 5.2 oz)   05/19/23 0300 (!) 168 kg (370 lb 14.4 oz)   05/18/23 1912 (!) 168 kg (371 lb 7.6 oz)   05/18/23 0600 (!) 169 kg (371 lb 11.1 oz)   05/16/23 0700 (!) 171 kg (377 lb 4.8 oz)   05/14/23 0500 (!) 171 kg (377 lb 3.3 oz)   05/12/23 1143 (!) 170 kg (375 lb)   05/06/23 0258 (!) 170 kg (375 lb 8 oz)   04/19/23 0909 (!) 163 kg (359 lb)   04/03/23 1906 (!) 168 kg (370 lb)   03/27/23 0938 (!) 170 kg (373 lb 10.9 oz)   03/17/23 1153 (!) 168 kg (370 lb)   01/27/23 1501 (!) 168 kg (370 lb)   12/22/22 1501 (!) 171 kg (376 lb)   11/08/22 1035 (!) 161 kg (355 lb)   10/01/22 1141 (!) 164 kg (360 lb 10.8 oz)   05/18/22 1311 (!) 155 kg (341 lb)   03/24/22 1432 (!) 155 kg (341 lb)   03/02/22 1412 (!) 155 kg (341 lb)   01/12/22 1317 (!) 155 kg (341 lb)   12/30/21 1431 (!) 155 kg (341 lb)   12/01/21 1144 (!) 155 kg (341 lb 11.4 oz)   12/01/21 0843 (!) 157 kg (346 lb)   11/22/21 0839 (!) 157 kg (346 lb)   11/15/21 1148 (!) 157 kg (346 lb 12.5 oz)   11/09/21 1139 (!) 157 kg (345 lb 7.4 oz)   11/05/21 1130 (!) 159 kg (351 lb 3.1 oz)   11/02/21 1121 (!) 161 kg (356  lb)   10/27/21 1048 (!) 161 kg (356 lb)   10/21/21 1418 (!) 162 kg (356 lb 14.8 oz)          Wt Change Observation Wt loss on Ozempic     Estimated/Assessed Needs  Estimated Needs based on: Adjusted Body Weight       Energy Requirements 25 kcal/kg    EST Needs (kcal/day) 2600       Protein Requirements 1.0-1.2 g/kg   EST Daily Needs (g/day) 104-125       Fluid Requirements 25 ml/kg    Estimated Needs (mL/day) 2600     Labs/Medications         Pertinent Labs Reviewed.   Results from last 7 days   Lab Units 07/04/24  0422   SODIUM mmol/L 136   POTASSIUM mmol/L 4.3   CHLORIDE mmol/L 104   CO2 mmol/L 24.7   BUN mg/dL 21   CREATININE mg/dL 0.47*   CALCIUM mg/dL 8.1*   GLUCOSE mg/dL 144*             COVID19   Date Value Ref Range Status   03/11/2024 Not Detected Not Detected - Ref. Range Final     Lab Results   Component Value Date    HGBA1C 5.00 05/06/2024         Pertinent Medications Reviewed.     Malnutrition Severity Assessment              Nutrition Diagnosis         Nutrition Dx Problem 1 Increased nutrient needs related to increased protein demand as evidenced by impaired skin integrity.     Nutrition Intervention           Current Nutrition Orders & Evaluation of Intake       Current PO Diet Diet: Diabetic Diets, High Protein Diet, Cardiac; Low Sodium (2g); Texture: Regular Texture (IDDSI 7); Fluid Consistency: Thin (IDDSI 0)   Supplement Orders Placed This Encounter      DIET MESSAGE Pt is allowed 2 plant-based burgers per day Tiff Orellana RD           Nutrition Intervention/Prescription        High protein diet   Plant-based burger okay. 2 per day = 560 mg Na+        Medical Nutrition Therapy/Nutrition Education          Learner     Readiness Patient  Acceptance     Method     Response Explanation  Verbalizes understanding     Monitor/Evaluation        Monitor PO intake, Weight, Skin status     Nutrition Discharge Plan         Continue high protein diet upon discharge      Electronically signed by:  Brigitte  ELIESER Mcclellan  07/10/24 09:33 EDT

## 2024-07-11 LAB
GLUCOSE BLDC GLUCOMTR-MCNC: 128 MG/DL (ref 70–99)
GLUCOSE BLDC GLUCOMTR-MCNC: 169 MG/DL (ref 70–99)
GLUCOSE BLDC GLUCOMTR-MCNC: 170 MG/DL (ref 70–99)
GLUCOSE BLDC GLUCOMTR-MCNC: 86 MG/DL (ref 70–99)

## 2024-07-11 PROCEDURE — 63710000001 INSULIN GLARGINE PER 5 UNITS: Performed by: PHYSICIAN ASSISTANT

## 2024-07-11 PROCEDURE — 82948 REAGENT STRIP/BLOOD GLUCOSE: CPT | Performed by: PHYSICIAN ASSISTANT

## 2024-07-11 PROCEDURE — 63710000001 ONDANSETRON ODT 4 MG TABLET DISPERSIBLE: Performed by: PHYSICIAN ASSISTANT

## 2024-07-11 PROCEDURE — 82948 REAGENT STRIP/BLOOD GLUCOSE: CPT

## 2024-07-11 PROCEDURE — 63710000001 INSULIN LISPRO (HUMAN) PER 5 UNITS: Performed by: PHYSICIAN ASSISTANT

## 2024-07-11 RX ADMIN — INSULIN GLARGINE 22 UNITS: 100 INJECTION, SOLUTION SUBCUTANEOUS at 21:28

## 2024-07-11 RX ADMIN — FERROUS SULFATE TAB 325 MG (65 MG ELEMENTAL FE) 325 MG: 325 (65 FE) TAB at 07:58

## 2024-07-11 RX ADMIN — ONDANSETRON 4 MG: 4 TABLET, ORALLY DISINTEGRATING ORAL at 10:01

## 2024-07-11 RX ADMIN — PREGABALIN 100 MG: 100 CAPSULE ORAL at 09:13

## 2024-07-11 RX ADMIN — APIXABAN 5 MG: 5 TABLET, FILM COATED ORAL at 09:13

## 2024-07-11 RX ADMIN — OXYCODONE AND ACETAMINOPHEN 1 TABLET: 10; 325 TABLET ORAL at 21:25

## 2024-07-11 RX ADMIN — DICLOFENAC SODIUM 4 G: 10 GEL TOPICAL at 13:54

## 2024-07-11 RX ADMIN — OXYCODONE AND ACETAMINOPHEN 1 TABLET: 10; 325 TABLET ORAL at 04:01

## 2024-07-11 RX ADMIN — CETIRIZINE HYDROCHLORIDE 10 MG: 10 TABLET, FILM COATED ORAL at 09:13

## 2024-07-11 RX ADMIN — BACLOFEN 10 MG: 10 TABLET ORAL at 04:01

## 2024-07-11 RX ADMIN — SERTRALINE HYDROCHLORIDE 50 MG: 50 TABLET ORAL at 21:25

## 2024-07-11 RX ADMIN — INSULIN LISPRO 3 UNITS: 100 INJECTION, SOLUTION INTRAVENOUS; SUBCUTANEOUS at 12:47

## 2024-07-11 RX ADMIN — PREGABALIN 100 MG: 100 CAPSULE ORAL at 16:27

## 2024-07-11 RX ADMIN — DICLOFENAC SODIUM 4 G: 10 GEL TOPICAL at 07:58

## 2024-07-11 RX ADMIN — MONTELUKAST 10 MG: 10 TABLET, FILM COATED ORAL at 21:24

## 2024-07-11 RX ADMIN — IBUPROFEN 400 MG: 400 TABLET ORAL at 11:06

## 2024-07-11 RX ADMIN — BACLOFEN 10 MG: 10 TABLET ORAL at 21:25

## 2024-07-11 RX ADMIN — INSULIN LISPRO 3 UNITS: 100 INJECTION, SOLUTION INTRAVENOUS; SUBCUTANEOUS at 21:27

## 2024-07-11 RX ADMIN — CYANOCOBALAMIN TAB 500 MCG 1000 MCG: 500 TAB at 09:13

## 2024-07-11 RX ADMIN — DICLOFENAC SODIUM 4 G: 10 GEL TOPICAL at 21:26

## 2024-07-11 RX ADMIN — EMPAGLIFLOZIN 10 MG: 10 TABLET, FILM COATED ORAL at 09:13

## 2024-07-11 RX ADMIN — ATORVASTATIN CALCIUM 10 MG: 10 TABLET, FILM COATED ORAL at 21:24

## 2024-07-11 RX ADMIN — FUROSEMIDE 40 MG: 40 TABLET ORAL at 09:19

## 2024-07-11 RX ADMIN — INSULIN GLARGINE 22 UNITS: 100 INJECTION, SOLUTION SUBCUTANEOUS at 09:13

## 2024-07-11 RX ADMIN — OXYCODONE AND ACETAMINOPHEN 1 TABLET: 10; 325 TABLET ORAL at 12:46

## 2024-07-11 RX ADMIN — Medication 10 MG: at 21:24

## 2024-07-11 RX ADMIN — BACLOFEN 10 MG: 10 TABLET ORAL at 12:46

## 2024-07-11 RX ADMIN — LISINOPRIL 2.5 MG: 2.5 TABLET ORAL at 09:19

## 2024-07-11 RX ADMIN — PREGABALIN 100 MG: 100 CAPSULE ORAL at 21:24

## 2024-07-11 RX ADMIN — APIXABAN 5 MG: 5 TABLET, FILM COATED ORAL at 21:23

## 2024-07-11 NOTE — PLAN OF CARE
Goal Outcome Evaluation:  Plan of Care Reviewed With: patient        Progress: no change  Outcome Evaluation: Patient is alert and oriented. Refused bath and Aveeno treatment scheduled at 1000. Also refused for wound nurse who rounded. Did allow BLE to be washed and moisturized at 1500 by SNF staff. Refused oral care and incentive spirometer. Con't to refuse turns. Given PRN Zofran ODT at 1001 for nausea without emesis. Med effective. Given PRN Ibuprofen at 1106 for left shoulder pain. Med effective. Given PRN Baclofen and Percocet at 1246 for left hip pain. Meds effective. Voices needs. Con't current POC.

## 2024-07-11 NOTE — SIGNIFICANT NOTE
Wound Eval / Progress Noted    KEO Dominguez     Patient Name: Jose Shaikh  : 1960  MRN: [id number]  Today's Date: 2024                 Admit Date: 2023    Visit Dx:    ICD-10-CM ICD-9-CM   1. Difficulty in walking  R26.2 719.7   2. Type 2 diabetes mellitus with other specified complication, unspecified whether long term insulin use  E11.69 250.80         Debility    Ulcer of right foot    Ulcerated, foot, left, limited to breakdown of skin    Onychomycosis        Past Medical History:   Diagnosis Date    Absence of toe of right foot     Acute osteomyelitis of left calcaneus  2021    Anxiety and depression     Arthritis     Cancer     Chronic pain     STATES HIS PAIN IS 10/10 AAT    Claustrophobia     Corns and callus     Diabetic ulcer of left heel associated with type 2 DM 2021    Diabetic ulcer of left heel associated with type 2 DM 2021    Diabetic ulcer of right midfoot associated with type 2 DM 2021    Difficulty walking     Essential hypertension 2021    Hammertoe     Hyperlipidemia LDL goal <100 2021    Ingrown toenail     Obesity     Paroxysmal atrial fibrillation 2021    Polyneuropathy     Pressure ulcer, stage 1     Tinea unguium     Type 2 diabetes mellitus with polyneuropathy         Past Surgical History:   Procedure Laterality Date    CYST REMOVAL      center of back; benign    EYE SURGERY      INCISION AND DRAINAGE ABSCESS      back    INCISION AND DRAINAGE LEG Right 12/10/2021    Procedure: INCISION AND DRAINAGE LOWER EXTREMITY;  Surgeon: Ash Leyva DPM;  Location: [facility] MAIN OR;  Service: Podiatry;  Laterality: Right;    OTHER SURGICAL HISTORY      Surgical clips left foot    TOE SURGERY Right     Removal of 5th toe    TRANS METATARSAL AMPUTATION Right 2021    Procedure: AMPUTATION TRANS METATARSAL;  Surgeon: Ash Leyva DPM;  Location: [facility] MAIN OR;  Service: Podiatry;  Laterality: Right;    VASCULAR  "SURGERY      WRIST SURGERY Left     repair of injury         Physical Assessment:  Rash 04/06/24 1659 Left calf plaque (Active)   Wound Image   07/11/24 1458   Distribution localized 07/11/24 1458   Color pink;red 07/11/24 1458   Configuration/Shape asymmetric 07/11/24 1458   Borders irregular;raised 07/11/24 1458   Characteristics raised 07/11/24 1458   Care, Rash open to air 07/11/24 1458       Rash 04/17/24 1443 Right lower leg macular (Active)   Wound Image   07/11/24 1458   Distribution localized 07/11/24 1458   Color pink;red 07/11/24 1458   Configuration/Shape asymmetric 07/11/24 1458   Borders irregular;raised 07/11/24 1458   Characteristics raised;dry 07/11/24 1458   Care, Rash open to air 07/11/24 1458      Wound Check / Follow-up: Patient seen today for wound follow-up.  Patient is awake, alert and oriented at time of visit. Per nursing staff, patient continues to refuse turns and care intermittently.  At time of visit, patient states \"when my legs are calm and not hurting, I do not want anyone messing with them.\"  Patient denies having topical treatment applied at this time.  Patient also refusing for assessment of sacral area.  He states he has no skin issues to this area.    Chronic discolorations remain to bilateral lower legs.  Raised areas to left calf and right anterior lower leg remain.  No drainage noted at this time.  Areas remain stable.  Recommending to continue skin care and topical treatment.    Impression: Chronic discolorations to bilateral lower legs.  Raised areas to left calf and right anterior lower leg.      Short term goals: Regain skin integrity, skin protection, moisture prevention, pressure reduction, skin care, topical treatment.    Dottie Guevara RN    7/11/2024    15:39 EDT   "

## 2024-07-12 LAB
GLUCOSE BLDC GLUCOMTR-MCNC: 109 MG/DL (ref 70–99)
GLUCOSE BLDC GLUCOMTR-MCNC: 118 MG/DL (ref 70–99)
GLUCOSE BLDC GLUCOMTR-MCNC: 164 MG/DL (ref 70–99)
GLUCOSE BLDC GLUCOMTR-MCNC: 87 MG/DL (ref 70–99)

## 2024-07-12 PROCEDURE — 82948 REAGENT STRIP/BLOOD GLUCOSE: CPT

## 2024-07-12 PROCEDURE — 63710000001 INSULIN LISPRO (HUMAN) PER 5 UNITS: Performed by: PHYSICIAN ASSISTANT

## 2024-07-12 PROCEDURE — 63710000001 INSULIN GLARGINE PER 5 UNITS: Performed by: PHYSICIAN ASSISTANT

## 2024-07-12 RX ADMIN — LISINOPRIL 2.5 MG: 2.5 TABLET ORAL at 08:44

## 2024-07-12 RX ADMIN — FUROSEMIDE 40 MG: 40 TABLET ORAL at 08:44

## 2024-07-12 RX ADMIN — ACETAMINOPHEN 650 MG: 325 TABLET ORAL at 20:34

## 2024-07-12 RX ADMIN — INSULIN GLARGINE 22 UNITS: 100 INJECTION, SOLUTION SUBCUTANEOUS at 20:29

## 2024-07-12 RX ADMIN — Medication 10 MG: at 20:29

## 2024-07-12 RX ADMIN — MONTELUKAST 10 MG: 10 TABLET, FILM COATED ORAL at 20:30

## 2024-07-12 RX ADMIN — PREGABALIN 100 MG: 100 CAPSULE ORAL at 20:30

## 2024-07-12 RX ADMIN — APIXABAN 5 MG: 5 TABLET, FILM COATED ORAL at 08:44

## 2024-07-12 RX ADMIN — BACLOFEN 10 MG: 10 TABLET ORAL at 06:37

## 2024-07-12 RX ADMIN — CETIRIZINE HYDROCHLORIDE 10 MG: 10 TABLET, FILM COATED ORAL at 08:44

## 2024-07-12 RX ADMIN — OXYCODONE AND ACETAMINOPHEN 1 TABLET: 10; 325 TABLET ORAL at 15:27

## 2024-07-12 RX ADMIN — OXYCODONE AND ACETAMINOPHEN 1 TABLET: 10; 325 TABLET ORAL at 06:37

## 2024-07-12 RX ADMIN — ACETAMINOPHEN 650 MG: 325 TABLET ORAL at 12:11

## 2024-07-12 RX ADMIN — ATORVASTATIN CALCIUM 10 MG: 10 TABLET, FILM COATED ORAL at 20:30

## 2024-07-12 RX ADMIN — SERTRALINE HYDROCHLORIDE 50 MG: 50 TABLET ORAL at 20:29

## 2024-07-12 RX ADMIN — FERROUS SULFATE TAB 325 MG (65 MG ELEMENTAL FE) 325 MG: 325 (65 FE) TAB at 08:44

## 2024-07-12 RX ADMIN — DICLOFENAC SODIUM 4 G: 10 GEL TOPICAL at 20:28

## 2024-07-12 RX ADMIN — PREGABALIN 100 MG: 100 CAPSULE ORAL at 15:27

## 2024-07-12 RX ADMIN — DICLOFENAC SODIUM 4 G: 10 GEL TOPICAL at 14:12

## 2024-07-12 RX ADMIN — EMPAGLIFLOZIN 10 MG: 10 TABLET, FILM COATED ORAL at 08:44

## 2024-07-12 RX ADMIN — CYANOCOBALAMIN TAB 500 MCG 1000 MCG: 500 TAB at 08:44

## 2024-07-12 RX ADMIN — INSULIN GLARGINE 22 UNITS: 100 INJECTION, SOLUTION SUBCUTANEOUS at 08:43

## 2024-07-12 RX ADMIN — INSULIN LISPRO 3 UNITS: 100 INJECTION, SOLUTION INTRAVENOUS; SUBCUTANEOUS at 20:28

## 2024-07-12 RX ADMIN — Medication 1 APPLICATION: at 14:12

## 2024-07-12 RX ADMIN — DICLOFENAC SODIUM 4 G: 10 GEL TOPICAL at 03:01

## 2024-07-12 RX ADMIN — IBUPROFEN 400 MG: 400 TABLET ORAL at 03:00

## 2024-07-12 RX ADMIN — DICLOFENAC SODIUM 4 G: 10 GEL TOPICAL at 08:44

## 2024-07-12 RX ADMIN — APIXABAN 5 MG: 5 TABLET, FILM COATED ORAL at 20:30

## 2024-07-12 RX ADMIN — PREGABALIN 100 MG: 100 CAPSULE ORAL at 08:44

## 2024-07-12 RX ADMIN — BACLOFEN 10 MG: 10 TABLET ORAL at 15:27

## 2024-07-12 NOTE — PLAN OF CARE
Goal Outcome Evaluation:  Plan of Care Reviewed With: patient        Progress: no change  Outcome Evaluation: Patient is A&OX4, has been in bed all shift, refuses to turn, but will assist in pulling himself up in the bed. Has refused some care, did get Aveeno applied to BLE. No questions, concerns, or issues at this time. Had dinner and snacks door dashed this evening. Will continue to monitor, call light in reach.

## 2024-07-12 NOTE — PLAN OF CARE
Goal Outcome Evaluation:  Plan of Care Reviewed With: patient        Progress: improving  Outcome Evaluation: Pt is alert and oriented, VSS. Refused Aveeno treatment on night shift and refuses turns. Pt c/o pain and was medicated x 2, was effective. Will continue with his plan of care. Call light and personal items within reach.

## 2024-07-13 LAB
GLUCOSE BLDC GLUCOMTR-MCNC: 106 MG/DL (ref 70–99)
GLUCOSE BLDC GLUCOMTR-MCNC: 135 MG/DL (ref 70–99)
GLUCOSE BLDC GLUCOMTR-MCNC: 144 MG/DL (ref 70–99)
GLUCOSE BLDC GLUCOMTR-MCNC: 197 MG/DL (ref 70–99)

## 2024-07-13 PROCEDURE — 82948 REAGENT STRIP/BLOOD GLUCOSE: CPT | Performed by: PHYSICIAN ASSISTANT

## 2024-07-13 PROCEDURE — 63710000001 INSULIN LISPRO (HUMAN) PER 5 UNITS: Performed by: PHYSICIAN ASSISTANT

## 2024-07-13 PROCEDURE — 63710000001 INSULIN GLARGINE PER 5 UNITS: Performed by: PHYSICIAN ASSISTANT

## 2024-07-13 PROCEDURE — 82948 REAGENT STRIP/BLOOD GLUCOSE: CPT

## 2024-07-13 PROCEDURE — 99309 SBSQ NF CARE MODERATE MDM 30: CPT | Performed by: PHYSICIAN ASSISTANT

## 2024-07-13 PROCEDURE — 63710000001 ONDANSETRON ODT 4 MG TABLET DISPERSIBLE: Performed by: PHYSICIAN ASSISTANT

## 2024-07-13 RX ADMIN — EMPAGLIFLOZIN 10 MG: 10 TABLET, FILM COATED ORAL at 08:52

## 2024-07-13 RX ADMIN — DICLOFENAC SODIUM 4 G: 10 GEL TOPICAL at 20:06

## 2024-07-13 RX ADMIN — DICLOFENAC SODIUM 4 G: 10 GEL TOPICAL at 01:22

## 2024-07-13 RX ADMIN — Medication 1 APPLICATION: at 16:53

## 2024-07-13 RX ADMIN — CYANOCOBALAMIN TAB 500 MCG 1000 MCG: 500 TAB at 08:51

## 2024-07-13 RX ADMIN — BACLOFEN 10 MG: 10 TABLET ORAL at 08:53

## 2024-07-13 RX ADMIN — INSULIN GLARGINE 22 UNITS: 100 INJECTION, SOLUTION SUBCUTANEOUS at 08:51

## 2024-07-13 RX ADMIN — LISINOPRIL 2.5 MG: 2.5 TABLET ORAL at 08:51

## 2024-07-13 RX ADMIN — INSULIN GLARGINE 22 UNITS: 100 INJECTION, SOLUTION SUBCUTANEOUS at 20:05

## 2024-07-13 RX ADMIN — CETIRIZINE HYDROCHLORIDE 10 MG: 10 TABLET, FILM COATED ORAL at 08:52

## 2024-07-13 RX ADMIN — PREGABALIN 100 MG: 100 CAPSULE ORAL at 20:06

## 2024-07-13 RX ADMIN — MONTELUKAST 10 MG: 10 TABLET, FILM COATED ORAL at 20:06

## 2024-07-13 RX ADMIN — APIXABAN 5 MG: 5 TABLET, FILM COATED ORAL at 08:52

## 2024-07-13 RX ADMIN — PREGABALIN 100 MG: 100 CAPSULE ORAL at 08:52

## 2024-07-13 RX ADMIN — DICLOFENAC SODIUM 4 G: 10 GEL TOPICAL at 13:16

## 2024-07-13 RX ADMIN — PREGABALIN 100 MG: 100 CAPSULE ORAL at 16:53

## 2024-07-13 RX ADMIN — DICLOFENAC SODIUM 4 G: 10 GEL TOPICAL at 08:51

## 2024-07-13 RX ADMIN — SERTRALINE HYDROCHLORIDE 50 MG: 50 TABLET ORAL at 20:06

## 2024-07-13 RX ADMIN — OXYCODONE AND ACETAMINOPHEN 1 TABLET: 10; 325 TABLET ORAL at 01:22

## 2024-07-13 RX ADMIN — IBUPROFEN 400 MG: 400 TABLET ORAL at 14:24

## 2024-07-13 RX ADMIN — FUROSEMIDE 40 MG: 40 TABLET ORAL at 08:52

## 2024-07-13 RX ADMIN — ATORVASTATIN CALCIUM 10 MG: 10 TABLET, FILM COATED ORAL at 20:06

## 2024-07-13 RX ADMIN — BACLOFEN 10 MG: 10 TABLET ORAL at 20:06

## 2024-07-13 RX ADMIN — INSULIN LISPRO 3 UNITS: 100 INJECTION, SOLUTION INTRAVENOUS; SUBCUTANEOUS at 20:05

## 2024-07-13 RX ADMIN — APIXABAN 5 MG: 5 TABLET, FILM COATED ORAL at 20:05

## 2024-07-13 RX ADMIN — Medication 10 MG: at 20:06

## 2024-07-13 RX ADMIN — OXYCODONE AND ACETAMINOPHEN 1 TABLET: 10; 325 TABLET ORAL at 08:52

## 2024-07-13 RX ADMIN — BACLOFEN 10 MG: 10 TABLET ORAL at 01:22

## 2024-07-13 RX ADMIN — ONDANSETRON 4 MG: 4 TABLET, ORALLY DISINTEGRATING ORAL at 13:16

## 2024-07-13 RX ADMIN — FERROUS SULFATE TAB 325 MG (65 MG ELEMENTAL FE) 325 MG: 325 (65 FE) TAB at 08:52

## 2024-07-13 RX ADMIN — OXYCODONE AND ACETAMINOPHEN 1 TABLET: 10; 325 TABLET ORAL at 20:05

## 2024-07-13 NOTE — PLAN OF CARE
Goal Outcome Evaluation:  Plan of Care Reviewed With: patient        Progress: no change  Outcome Evaluation: Vital signs stable. Alert and oriented X 4. No significant change. Refused skin care to bilateral lower extremities. Continues with trapeze for repositioning self. Continue plan of care.

## 2024-07-13 NOTE — PROGRESS NOTES
[facility]   Hospitalist Progress Note  Date: 2024  Patient Name: Jose Shaikh  : 1960  MRN: [id number]  Date of admission: 2023      Subjective   Subjective     Chief Complaint: Weakness    Summary: Jose Shaikh is a 63 y.o. male  initially hospitalized on 9/10/2023, prolonged hospitalization for treatment of management of generalized weakness, deconditioning, with acute issues of diarrhea, C. difficile negative.  Diarrhea improved.  Had small hematoma after ambulating on his foot.  Unfortunately with exhaustive efforts to try to place this gentleman after 39 days of hospitalization, we are unable to find a facility that will accept him.  He has no further acute needs or requirements for inpatient monitoring and management, his labs and vitals are stable, he is tolerating oral intake, and has been refusing turns and repositionings and other interventions with nursing staff despite recommendations.  Patient discharged in hemodynamically stable condition on 10/19/2023 to follow-up with PCP within 1 week. Unfortunately we cannot solve all of his social issues during this hospitalization due to lack of resources and participation from the patient's perspective despite all our efforts.  Patient has a financial means of making arrangements at home.  Patient appealed his discharge.  Lost his appeal.  LCD: 10/20/23, PFL beginning: 10/21/23 @ 12pm.  Due to an inability to place the patient in a.m. nursing home he has been placed in our skilled nursing facility until arrangements can be made or until he is able to care for himself.      Interval Followup: 2024    No new complaints  Patient would like to lose additional weight and is asking about uptitrating Ozempic  We will plan to increase to 2 mg weekly starting Wednesday  Vital stable   No fevers  No nausea or vomiting  Glucose reasonably controlled        Review of systems: All systems reviewed and negative except what is noted above in  interval follow-up   Objective   Objective     Vitals:   Temp:  [97.5 °F (36.4 °C)-97.9 °F (36.6 °C)] 97.5 °F (36.4 °C)  Heart Rate:  [77-78] 77  Resp:  [18] 18  BP: (110-116)/(61-62) 116/62    Physical Exam   Constitutional: No distress.  Alert and conversational.  Respiratory: No wheeze noted.  GI: Abdomen: Obese  Neuro/psych:  Calm mood.  No dysarthria.  Extremities: Mild trace-1 lower extremity edema noted.  Right TMA.    Result Review    Result Review:  I have personally reviewed the results from the time of this admission to 7/13/2024 16:17 EDT and agree with these findings:  [x]  Laboratory  [x]  Microbiology  []  Radiology  []  EKG/Telemetry   []  Cardiology/Vascular   []  Pathology  []  Old records  []  Other:    Assessment & Plan   Assessment / Plan   Assessment:  Hospital-acquired weakness  Hx of Influenza A  Hx of C. difficile diarrhea treated  Generalized weakness  Deconditioning  DM (Kami Garcia and PCP)  HTN  PAF (on Eliquis; previously seen Dr. Duval)  Morbid obesity with BMI 44  Debility with wheelchair dependence  Hx of severe left hip pain  Severe degenerative joint disease of lower extremities  Hx L calcaneus osteomyelitis  Chronic venous stasis  Hx R transmetatarsal amputation  Chronic bilateral foot wounds with right transmetatarsal amputation, healing by secondary intention (wound care clinic; podiatrist Gordon)  Thrombocytopenia      Plan:    Discussed with patient.  Is very interested in pursuing more weight loss and would like to uptitrate Ozempic this coming week.  Will plan to increase to 2 mg weekly  Continue PT/OT  As he continues to lose weight, he may be a candidate for surgical options  Appreciate input from wound care consult  As needed Lasix  Continue basal insulin and SSI per protocol titrate as needed  Continue Eliquis for stroke prophylaxis  Continue probiotics  Discussed with RN    DVT prophylaxis:  Pharmacologic VTE prophylaxis orders are present.    CODE STATUS:   Code  Status (Patient has no pulse and is not breathing): CPR (Attempt to Resuscitate)  Medical Interventions (Patient has pulse or is breathing): Full Support

## 2024-07-13 NOTE — PLAN OF CARE
Goal Outcome Evaluation:  Plan of Care Reviewed With: patient        Progress: no change  Outcome Evaluation: Resident alert, oriented, able to make needs known to staff. remains bedbound, refused to participate in bedside exercises, refused bath, but agreed to skin care. incontinent of bowel x2 this shift. medicated for prn pain x1, medicated for prn nausea x1. slept most of shift. continue current plan of care

## 2024-07-14 LAB
GLUCOSE BLDC GLUCOMTR-MCNC: 101 MG/DL (ref 70–99)
GLUCOSE BLDC GLUCOMTR-MCNC: 114 MG/DL (ref 70–99)
GLUCOSE BLDC GLUCOMTR-MCNC: 124 MG/DL (ref 70–99)
GLUCOSE BLDC GLUCOMTR-MCNC: 165 MG/DL (ref 70–99)

## 2024-07-14 PROCEDURE — 82948 REAGENT STRIP/BLOOD GLUCOSE: CPT

## 2024-07-14 PROCEDURE — 63710000001 INSULIN GLARGINE PER 5 UNITS: Performed by: PHYSICIAN ASSISTANT

## 2024-07-14 PROCEDURE — 63710000001 INSULIN LISPRO (HUMAN) PER 5 UNITS: Performed by: PHYSICIAN ASSISTANT

## 2024-07-14 RX ADMIN — APIXABAN 5 MG: 5 TABLET, FILM COATED ORAL at 20:09

## 2024-07-14 RX ADMIN — DICLOFENAC SODIUM 4 G: 10 GEL TOPICAL at 19:27

## 2024-07-14 RX ADMIN — LISINOPRIL 2.5 MG: 2.5 TABLET ORAL at 08:43

## 2024-07-14 RX ADMIN — EMPAGLIFLOZIN 10 MG: 10 TABLET, FILM COATED ORAL at 08:43

## 2024-07-14 RX ADMIN — PREGABALIN 100 MG: 100 CAPSULE ORAL at 20:09

## 2024-07-14 RX ADMIN — CYANOCOBALAMIN TAB 500 MCG 1000 MCG: 500 TAB at 08:43

## 2024-07-14 RX ADMIN — PREGABALIN 100 MG: 100 CAPSULE ORAL at 16:33

## 2024-07-14 RX ADMIN — DICLOFENAC SODIUM 4 G: 10 GEL TOPICAL at 08:42

## 2024-07-14 RX ADMIN — INSULIN LISPRO 3 UNITS: 100 INJECTION, SOLUTION INTRAVENOUS; SUBCUTANEOUS at 20:08

## 2024-07-14 RX ADMIN — DICLOFENAC SODIUM 4 G: 10 GEL TOPICAL at 13:36

## 2024-07-14 RX ADMIN — BACLOFEN 10 MG: 10 TABLET ORAL at 14:39

## 2024-07-14 RX ADMIN — IBUPROFEN 400 MG: 400 TABLET ORAL at 19:26

## 2024-07-14 RX ADMIN — FUROSEMIDE 40 MG: 40 TABLET ORAL at 08:43

## 2024-07-14 RX ADMIN — OXYCODONE AND ACETAMINOPHEN 1 TABLET: 10; 325 TABLET ORAL at 06:37

## 2024-07-14 RX ADMIN — FERROUS SULFATE TAB 325 MG (65 MG ELEMENTAL FE) 325 MG: 325 (65 FE) TAB at 08:43

## 2024-07-14 RX ADMIN — INSULIN GLARGINE 22 UNITS: 100 INJECTION, SOLUTION SUBCUTANEOUS at 20:08

## 2024-07-14 RX ADMIN — MONTELUKAST 10 MG: 10 TABLET, FILM COATED ORAL at 20:08

## 2024-07-14 RX ADMIN — DICLOFENAC SODIUM 4 G: 10 GEL TOPICAL at 01:12

## 2024-07-14 RX ADMIN — ACETAMINOPHEN 650 MG: 325 TABLET ORAL at 10:41

## 2024-07-14 RX ADMIN — SERTRALINE HYDROCHLORIDE 50 MG: 50 TABLET ORAL at 20:08

## 2024-07-14 RX ADMIN — CETIRIZINE HYDROCHLORIDE 10 MG: 10 TABLET, FILM COATED ORAL at 08:43

## 2024-07-14 RX ADMIN — Medication 1 APPLICATION: at 16:33

## 2024-07-14 RX ADMIN — APIXABAN 5 MG: 5 TABLET, FILM COATED ORAL at 08:43

## 2024-07-14 RX ADMIN — INSULIN GLARGINE 22 UNITS: 100 INJECTION, SOLUTION SUBCUTANEOUS at 08:43

## 2024-07-14 RX ADMIN — OXYCODONE AND ACETAMINOPHEN 1 TABLET: 10; 325 TABLET ORAL at 14:39

## 2024-07-14 RX ADMIN — PREGABALIN 100 MG: 100 CAPSULE ORAL at 08:43

## 2024-07-14 RX ADMIN — ATORVASTATIN CALCIUM 10 MG: 10 TABLET, FILM COATED ORAL at 20:09

## 2024-07-14 RX ADMIN — BACLOFEN 10 MG: 10 TABLET ORAL at 06:37

## 2024-07-14 RX ADMIN — Medication 10 MG: at 20:09

## 2024-07-14 RX ADMIN — IBUPROFEN 400 MG: 400 TABLET ORAL at 01:11

## 2024-07-14 NOTE — PLAN OF CARE
"Goal Outcome Evaluation:  Plan of Care Reviewed With: patient        Progress: no change  Outcome Evaluation: Resident alert, oriented, able to make needs known to staff. Remains bedbound. continues to be incontinebnt of bowel (x6 this shift) Waits for staff to do pericare and then states \"let me push the rest of it out\" and has additional incont. episode. Medicated for prn pain with Ibuprofen x1, Baclofen and percocet x1, effective. Blood glucose stable for all 3 meals. patches of skin rahs to BL LE warm today and red. lotion applied as ordered. Continue current plan of care.                               "

## 2024-07-14 NOTE — PLAN OF CARE
Goal Outcome Evaluation:  Plan of Care Reviewed With: patient        Progress: no change  Outcome Evaluation: Vital signs stable. Able to communicate needs. Percocet, baclofen, and ibuprofen administered for left shoulder and hip pain. Refused skin care to BLE. Continues to use trapeze for self repositioning. Watched television for much of the night. Continue plan of care.

## 2024-07-15 LAB
GLUCOSE BLDC GLUCOMTR-MCNC: 104 MG/DL (ref 70–99)
GLUCOSE BLDC GLUCOMTR-MCNC: 117 MG/DL (ref 70–99)
GLUCOSE BLDC GLUCOMTR-MCNC: 151 MG/DL (ref 70–99)
GLUCOSE BLDC GLUCOMTR-MCNC: 165 MG/DL (ref 70–99)

## 2024-07-15 PROCEDURE — 82948 REAGENT STRIP/BLOOD GLUCOSE: CPT

## 2024-07-15 PROCEDURE — 63710000001 INSULIN GLARGINE PER 5 UNITS: Performed by: PHYSICIAN ASSISTANT

## 2024-07-15 PROCEDURE — 63710000001 INSULIN LISPRO (HUMAN) PER 5 UNITS: Performed by: PHYSICIAN ASSISTANT

## 2024-07-15 RX ADMIN — BACLOFEN 10 MG: 10 TABLET ORAL at 19:19

## 2024-07-15 RX ADMIN — OXYCODONE AND ACETAMINOPHEN 1 TABLET: 10; 325 TABLET ORAL at 10:25

## 2024-07-15 RX ADMIN — PREGABALIN 100 MG: 100 CAPSULE ORAL at 20:16

## 2024-07-15 RX ADMIN — OXYCODONE AND ACETAMINOPHEN 1 TABLET: 10; 325 TABLET ORAL at 01:51

## 2024-07-15 RX ADMIN — LISINOPRIL 2.5 MG: 2.5 TABLET ORAL at 09:06

## 2024-07-15 RX ADMIN — PREGABALIN 100 MG: 100 CAPSULE ORAL at 16:04

## 2024-07-15 RX ADMIN — INSULIN GLARGINE 22 UNITS: 100 INJECTION, SOLUTION SUBCUTANEOUS at 20:16

## 2024-07-15 RX ADMIN — Medication 10 MG: at 20:16

## 2024-07-15 RX ADMIN — DICLOFENAC SODIUM 4 G: 10 GEL TOPICAL at 16:06

## 2024-07-15 RX ADMIN — DICLOFENAC SODIUM 4 G: 10 GEL TOPICAL at 09:05

## 2024-07-15 RX ADMIN — CYANOCOBALAMIN TAB 500 MCG 1000 MCG: 500 TAB at 09:06

## 2024-07-15 RX ADMIN — FERROUS SULFATE TAB 325 MG (65 MG ELEMENTAL FE) 325 MG: 325 (65 FE) TAB at 09:06

## 2024-07-15 RX ADMIN — APIXABAN 5 MG: 5 TABLET, FILM COATED ORAL at 09:06

## 2024-07-15 RX ADMIN — CETIRIZINE HYDROCHLORIDE 10 MG: 10 TABLET, FILM COATED ORAL at 09:06

## 2024-07-15 RX ADMIN — OXYCODONE AND ACETAMINOPHEN 1 TABLET: 10; 325 TABLET ORAL at 19:19

## 2024-07-15 RX ADMIN — EMPAGLIFLOZIN 10 MG: 10 TABLET, FILM COATED ORAL at 09:06

## 2024-07-15 RX ADMIN — IBUPROFEN 400 MG: 400 TABLET ORAL at 16:04

## 2024-07-15 RX ADMIN — BACLOFEN 10 MG: 10 TABLET ORAL at 01:51

## 2024-07-15 RX ADMIN — PREGABALIN 100 MG: 100 CAPSULE ORAL at 09:06

## 2024-07-15 RX ADMIN — ATORVASTATIN CALCIUM 10 MG: 10 TABLET, FILM COATED ORAL at 20:16

## 2024-07-15 RX ADMIN — INSULIN LISPRO 3 UNITS: 100 INJECTION, SOLUTION INTRAVENOUS; SUBCUTANEOUS at 17:34

## 2024-07-15 RX ADMIN — DICLOFENAC SODIUM 4 G: 10 GEL TOPICAL at 20:16

## 2024-07-15 RX ADMIN — DICLOFENAC SODIUM 4 G: 10 GEL TOPICAL at 01:51

## 2024-07-15 RX ADMIN — SERTRALINE HYDROCHLORIDE 50 MG: 50 TABLET ORAL at 20:16

## 2024-07-15 RX ADMIN — INSULIN GLARGINE 22 UNITS: 100 INJECTION, SOLUTION SUBCUTANEOUS at 09:06

## 2024-07-15 RX ADMIN — INSULIN LISPRO 3 UNITS: 100 INJECTION, SOLUTION INTRAVENOUS; SUBCUTANEOUS at 20:16

## 2024-07-15 RX ADMIN — MONTELUKAST 10 MG: 10 TABLET, FILM COATED ORAL at 20:16

## 2024-07-15 RX ADMIN — APIXABAN 5 MG: 5 TABLET, FILM COATED ORAL at 20:16

## 2024-07-15 RX ADMIN — BACLOFEN 10 MG: 10 TABLET ORAL at 10:25

## 2024-07-15 RX ADMIN — FUROSEMIDE 40 MG: 40 TABLET ORAL at 09:06

## 2024-07-15 NOTE — PLAN OF CARE
Goal Outcome Evaluation:  Plan of Care Reviewed With: patient        Progress: no change  Outcome Evaluation: resident alert, oriented, able to make needs known to staff. Remains bedbound. did exercises in bed with staff today. refused skin care to BLLE. Blood glucose stable for BF and lunch, elevated for dinner, covered with sliding scale. continues with incontinence of urine and bowel. received new orders for increase in Ozempic, starting on Wednesday to achieve wt loss required for surgery. Resident doordashed from [facility] for dinner, declined hospital dinner. Will continue current plan of care.

## 2024-07-15 NOTE — PLAN OF CARE
Goal Outcome Evaluation:  Plan of Care Reviewed With: patient        Progress: no change  Outcome Evaluation: Alert and oriented X 4. Vital signs stable. No significant change. Refused skin care to BLE. Ibuprofen, Baclofen, and Percocet administered for pain to left shoulder and hip. Continue plan of care.

## 2024-07-16 LAB
GLUCOSE BLDC GLUCOMTR-MCNC: 123 MG/DL (ref 70–99)
GLUCOSE BLDC GLUCOMTR-MCNC: 137 MG/DL (ref 70–99)
GLUCOSE BLDC GLUCOMTR-MCNC: 141 MG/DL (ref 70–99)
GLUCOSE BLDC GLUCOMTR-MCNC: 98 MG/DL (ref 70–99)

## 2024-07-16 PROCEDURE — 63710000001 INSULIN GLARGINE PER 5 UNITS: Performed by: PHYSICIAN ASSISTANT

## 2024-07-16 PROCEDURE — 82948 REAGENT STRIP/BLOOD GLUCOSE: CPT

## 2024-07-16 RX ADMIN — Medication 10 MG: at 21:04

## 2024-07-16 RX ADMIN — ACETAMINOPHEN 650 MG: 325 TABLET ORAL at 23:48

## 2024-07-16 RX ADMIN — IBUPROFEN 400 MG: 400 TABLET ORAL at 18:27

## 2024-07-16 RX ADMIN — PREGABALIN 100 MG: 100 CAPSULE ORAL at 09:04

## 2024-07-16 RX ADMIN — OXYCODONE AND ACETAMINOPHEN 1 TABLET: 10; 325 TABLET ORAL at 04:32

## 2024-07-16 RX ADMIN — INSULIN GLARGINE 22 UNITS: 100 INJECTION, SOLUTION SUBCUTANEOUS at 21:05

## 2024-07-16 RX ADMIN — OXYCODONE AND ACETAMINOPHEN 1 TABLET: 10; 325 TABLET ORAL at 12:35

## 2024-07-16 RX ADMIN — APIXABAN 5 MG: 5 TABLET, FILM COATED ORAL at 21:05

## 2024-07-16 RX ADMIN — PREGABALIN 100 MG: 100 CAPSULE ORAL at 21:05

## 2024-07-16 RX ADMIN — PREGABALIN 100 MG: 100 CAPSULE ORAL at 16:32

## 2024-07-16 RX ADMIN — BACLOFEN 10 MG: 10 TABLET ORAL at 04:32

## 2024-07-16 RX ADMIN — FERROUS SULFATE TAB 325 MG (65 MG ELEMENTAL FE) 325 MG: 325 (65 FE) TAB at 09:03

## 2024-07-16 RX ADMIN — MONTELUKAST 10 MG: 10 TABLET, FILM COATED ORAL at 21:05

## 2024-07-16 RX ADMIN — OXYCODONE AND ACETAMINOPHEN 1 TABLET: 10; 325 TABLET ORAL at 21:04

## 2024-07-16 RX ADMIN — SERTRALINE HYDROCHLORIDE 50 MG: 50 TABLET ORAL at 21:05

## 2024-07-16 RX ADMIN — DICLOFENAC SODIUM 4 G: 10 GEL TOPICAL at 21:07

## 2024-07-16 RX ADMIN — CETIRIZINE HYDROCHLORIDE 10 MG: 10 TABLET, FILM COATED ORAL at 09:03

## 2024-07-16 RX ADMIN — DICLOFENAC SODIUM 4 G: 10 GEL TOPICAL at 02:40

## 2024-07-16 RX ADMIN — BACLOFEN 10 MG: 10 TABLET ORAL at 12:35

## 2024-07-16 RX ADMIN — ATORVASTATIN CALCIUM 10 MG: 10 TABLET, FILM COATED ORAL at 21:05

## 2024-07-16 RX ADMIN — FUROSEMIDE 40 MG: 40 TABLET ORAL at 09:03

## 2024-07-16 RX ADMIN — INSULIN GLARGINE 22 UNITS: 100 INJECTION, SOLUTION SUBCUTANEOUS at 09:02

## 2024-07-16 RX ADMIN — EMPAGLIFLOZIN 10 MG: 10 TABLET, FILM COATED ORAL at 09:03

## 2024-07-16 RX ADMIN — LISINOPRIL 2.5 MG: 2.5 TABLET ORAL at 09:03

## 2024-07-16 RX ADMIN — BACLOFEN 10 MG: 10 TABLET ORAL at 21:05

## 2024-07-16 RX ADMIN — CYANOCOBALAMIN TAB 500 MCG 1000 MCG: 500 TAB at 09:03

## 2024-07-16 RX ADMIN — DICLOFENAC SODIUM 4 G: 10 GEL TOPICAL at 13:42

## 2024-07-16 RX ADMIN — ACETAMINOPHEN 650 MG: 325 TABLET ORAL at 10:33

## 2024-07-16 RX ADMIN — APIXABAN 5 MG: 5 TABLET, FILM COATED ORAL at 09:03

## 2024-07-16 RX ADMIN — DICLOFENAC SODIUM 4 G: 10 GEL TOPICAL at 09:04

## 2024-07-16 NOTE — PLAN OF CARE
Goal Outcome Evaluation:  Plan of Care Reviewed With: patient        Progress: no change  Outcome Evaluation: Patient refused skin care to LE this afternoon. He c/o of pain to left hip, prn Tylenol given without resolution of pain. PRN Baclofen and Oxycodone given with improvement noted. He continues to door dash snacks. Patient encouraged to limit snacks. Remained in bed. Blood sugars wnl, sliding scale insulin not required.

## 2024-07-16 NOTE — PLAN OF CARE
Goal Outcome Evaluation:           Progress: no change    Outcome Evaluation: Rsd. is alert and oriented, able to make needs known to staff.  Rsd. has rested well this shift in between care.  Medicated x1 with prn baclofen and oxycodone, see emar.  Rsd.  States medications effective.  Rsd. also recieving scheduled voltaren gel to L) shoulder and L) elbow.  Rsd. tolerating well, states effective.  Call light and personal items within reach, Rsd. reminded to use call light for assistance, verbalizes understanding.  Rsd. refuses turns.  Weight shifts with trapeze bar.  Skin care completed to Stanley. lower legs this shift.  Aveeno lotion applied.  Rsd. refuses to transfer or get out of bed this shift.  X1-2 assist to turn and place on bedpan.  Activity encouraged.  WIll continue to monitor and notify on-coming staff.  Current plan of care remains in place this shift.

## 2024-07-17 LAB
GLUCOSE BLDC GLUCOMTR-MCNC: 101 MG/DL (ref 70–99)
GLUCOSE BLDC GLUCOMTR-MCNC: 116 MG/DL (ref 70–99)
GLUCOSE BLDC GLUCOMTR-MCNC: 162 MG/DL (ref 70–99)
GLUCOSE BLDC GLUCOMTR-MCNC: 95 MG/DL (ref 70–99)

## 2024-07-17 PROCEDURE — 63710000001 INSULIN GLARGINE PER 5 UNITS: Performed by: PHYSICIAN ASSISTANT

## 2024-07-17 PROCEDURE — 63710000001 ONDANSETRON ODT 4 MG TABLET DISPERSIBLE: Performed by: PHYSICIAN ASSISTANT

## 2024-07-17 PROCEDURE — 63710000001 INSULIN LISPRO (HUMAN) PER 5 UNITS: Performed by: PHYSICIAN ASSISTANT

## 2024-07-17 PROCEDURE — 82948 REAGENT STRIP/BLOOD GLUCOSE: CPT

## 2024-07-17 RX ADMIN — INSULIN GLARGINE 22 UNITS: 100 INJECTION, SOLUTION SUBCUTANEOUS at 09:55

## 2024-07-17 RX ADMIN — Medication 1 APPLICATION: at 14:02

## 2024-07-17 RX ADMIN — BACLOFEN 10 MG: 10 TABLET ORAL at 12:05

## 2024-07-17 RX ADMIN — DICLOFENAC SODIUM 4 G: 10 GEL TOPICAL at 02:06

## 2024-07-17 RX ADMIN — SERTRALINE HYDROCHLORIDE 50 MG: 50 TABLET ORAL at 21:08

## 2024-07-17 RX ADMIN — DICLOFENAC SODIUM 4 G: 10 GEL TOPICAL at 20:48

## 2024-07-17 RX ADMIN — DICLOFENAC SODIUM 4 G: 10 GEL TOPICAL at 14:02

## 2024-07-17 RX ADMIN — ATORVASTATIN CALCIUM 10 MG: 10 TABLET, FILM COATED ORAL at 21:08

## 2024-07-17 RX ADMIN — CETIRIZINE HYDROCHLORIDE 10 MG: 10 TABLET, FILM COATED ORAL at 09:56

## 2024-07-17 RX ADMIN — OXYCODONE AND ACETAMINOPHEN 1 TABLET: 10; 325 TABLET ORAL at 04:28

## 2024-07-17 RX ADMIN — OXYCODONE AND ACETAMINOPHEN 1 TABLET: 10; 325 TABLET ORAL at 12:05

## 2024-07-17 RX ADMIN — LISINOPRIL 2.5 MG: 2.5 TABLET ORAL at 09:56

## 2024-07-17 RX ADMIN — BACLOFEN 10 MG: 10 TABLET ORAL at 04:28

## 2024-07-17 RX ADMIN — MONTELUKAST 10 MG: 10 TABLET, FILM COATED ORAL at 21:08

## 2024-07-17 RX ADMIN — BACLOFEN 10 MG: 10 TABLET ORAL at 20:13

## 2024-07-17 RX ADMIN — FUROSEMIDE 40 MG: 40 TABLET ORAL at 09:56

## 2024-07-17 RX ADMIN — APIXABAN 5 MG: 5 TABLET, FILM COATED ORAL at 09:56

## 2024-07-17 RX ADMIN — ONDANSETRON 4 MG: 4 TABLET, ORALLY DISINTEGRATING ORAL at 14:01

## 2024-07-17 RX ADMIN — PREGABALIN 100 MG: 100 CAPSULE ORAL at 21:08

## 2024-07-17 RX ADMIN — FERROUS SULFATE TAB 325 MG (65 MG ELEMENTAL FE) 325 MG: 325 (65 FE) TAB at 09:56

## 2024-07-17 RX ADMIN — PREGABALIN 100 MG: 100 CAPSULE ORAL at 17:42

## 2024-07-17 RX ADMIN — OXYCODONE AND ACETAMINOPHEN 1 TABLET: 10; 325 TABLET ORAL at 20:13

## 2024-07-17 RX ADMIN — CYANOCOBALAMIN TAB 500 MCG 1000 MCG: 500 TAB at 09:56

## 2024-07-17 RX ADMIN — EMPAGLIFLOZIN 10 MG: 10 TABLET, FILM COATED ORAL at 09:56

## 2024-07-17 RX ADMIN — APIXABAN 5 MG: 5 TABLET, FILM COATED ORAL at 21:08

## 2024-07-17 RX ADMIN — PREGABALIN 100 MG: 100 CAPSULE ORAL at 09:56

## 2024-07-17 RX ADMIN — Medication 10 MG: at 21:08

## 2024-07-17 RX ADMIN — INSULIN GLARGINE 22 UNITS: 100 INJECTION, SOLUTION SUBCUTANEOUS at 21:09

## 2024-07-17 RX ADMIN — INSULIN LISPRO 3 UNITS: 100 INJECTION, SOLUTION INTRAVENOUS; SUBCUTANEOUS at 21:10

## 2024-07-17 RX ADMIN — DICLOFENAC SODIUM 4 G: 10 GEL TOPICAL at 09:59

## 2024-07-17 NOTE — PROGRESS NOTES
"Nursing Facility Medication Regimen Review    Potentially Clinically Significant Medication Issues during this review: []Identified   [x]Not identified    Provider acknowledgement required?   []Yes   [x]No    Jose Shaikh is a 63 y.o.male admitted by Jose Maya MD , on 11/6/2023  1:34 PM , for Debility [R53.81]    Visit Vitals  /53 (BP Location: Right arm, Patient Position: Lying)   Pulse 70   Temp 97.3 °F (36.3 °C) (Oral)   Resp 16   Ht 188 cm (74.02\")   Wt (!) 156 kg (343 lb 14.7 oz)   SpO2 96%   BMI 44.14 kg/m²        Lab Results   Component Value Date    GLUCOSE 144 (H) 07/04/2024    BUN 21 07/04/2024    CREATININE 0.47 (L) 07/04/2024    EGFRIFNONA 134 12/20/2021    BCR 44.7 (H) 07/04/2024    K 4.3 07/04/2024    CO2 24.7 07/04/2024    CALCIUM 8.1 (L) 07/04/2024    ALBUMIN 3.0 (L) 06/06/2024    LABIL2 1.3 (L) 08/07/2019    AST 50 (H) 04/08/2024    ALT 36 04/08/2024    WBC 3.56 06/30/2024    HGB 11.4 (L) 06/30/2024    HCT 36.1 (L) 06/30/2024    MCV 88.3 06/30/2024    PLT 84 (L) 06/30/2024        Active Ambulatory Problems     Diagnosis Date Noted    Diabetic ulcer of left heel associated with type 2 DM 07/06/2021    Acute osteomyelitis of left calcaneus  08/18/2021    Diabetic ulcer of left heel associated with type 2 DM 08/18/2021    Diabetic ulcer of right midfoot associated with type 2 DM 08/18/2021    Paroxysmal atrial fibrillation 08/31/2021    Essential hypertension 08/31/2021    Hyperlipidemia LDL goal <100 08/31/2021    Cellulitis and abscess of foot 12/01/2021    High alkaline phosphatase level 12/19/2021    Osteomyelitis 10/01/2022    Onychomycosis 10/02/2022    Onychocryptosis 10/02/2022    Foot pain, bilateral 10/02/2022    Osteomyelitis of foot, right, acute 10/05/2022    Cellulitis of right foot 10/05/2022    Type 2 diabetes mellitus, with long-term current use of insulin 11/08/2022    Class 3 severe obesity due to excess calories with serious comorbidity and body mass index (BMI) of 45.0 " to 49.9 in adult 11/08/2022    Anxiety disorder, unspecified 10/07/2022    Claustrophobia 05/02/2022    Dependence on wheelchair 10/27/2022    Depression, unspecified 05/02/2022    Long term (current) use of anticoagulants 05/02/2022    Long term (current) use of oral hypoglycemic drugs 05/02/2022    Wound of foot 05/02/2022    Ulcer of right foot 05/02/2022    Orthostatic hypotension 10/07/2022    Other chronic osteomyelitis, right ankle and foot 10/07/2022    Personal history of nicotine dependence 05/02/2022    Thrombocytopenia, unspecified 10/07/2022    Unspecified open wound, right foot, initial encounter 04/03/2023    Diabetic foot infection 04/03/2023    Subacute osteomyelitis of right foot 04/06/2023    Right foot pain 05/12/2023    Sepsis 05/12/2023    Onychomycosis 05/22/2023    Foot pain, left 05/22/2023    Impaired mobility and ADLs 06/16/2023    Absence of toe of right foot 07/11/2023    Corns and callosity 07/11/2023    Disability of walking 07/11/2023    Fracture 07/11/2023    Limb swelling 07/11/2023    Polyneuropathy 07/11/2023    Pressure ulcer, stage 1 07/11/2023    Shortness of breath 07/11/2023    Generalized weakness 09/10/2023     Resolved Ambulatory Problems     Diagnosis Date Noted    Lactic acidosis 12/01/2021    Abscess of back 12/20/2021     Past Medical History:   Diagnosis Date    Anxiety and depression     Arthritis     Cancer     Chronic pain     Corns and callus     Difficulty walking     Hammertoe     Ingrown toenail     Obesity     Tinea unguium     Type 2 diabetes mellitus with polyneuropathy         Hospital Medications (active)         Dose Frequency Start End Indication    !Patient Home Medications Stored in Pharmacy  2 Times Daily 4/17/2024 -- Informational    Admin Instructions: [facility]. Immediately prior to discharge, contact pharmacy to ensure meds are sent home with patient.     Route: Does not apply     !Patient Home Medications Stored  on Unit  2 Times Daily 2/21/2024 -- Informational    Admin Instructions: Patient has home medication(s) stored on the nursing unit. Please remove and give to patient before discharge.     Route: Does not apply     (HOME MED) - Semaglutide (1 MG/DOSE) (OZEMPIC) solution pen-injector 1 mg 1 mg Every 7 Days 4/17/2024 -- T2DM, weight loss    Admin Instructions: Inject 1 mg under the skin into the appropriate area as directed every 7 days     Notes to Pharmacy: ID by Herrera Sinha, PharmD  04/17/24  17:14 EDT    4/17: 1 new syringe brought in for 4 doses of 1 mg    [facility] PS[facility]     Route: Subcutaneous     acetaminophen (TYLENOL) tablet 650 mg 650 mg Every 4 Hours PRN 3/18/2024 -- PRN mild pain, headache, fever    Admin Instructions: Based on patient request - if ordered for moderate or severe pain, provider allows for administration of a medication prescribed for a lower pain scale.    Do not exceed 4 grams of acetaminophen in a 24 hr period. Max dose of 2gm for AST/ALT greater than 120 units/L.    If given for pain, use the following pain scale:   Mild Pain = Pain Score of 1-3, CPOT 1-2  Moderate Pain = Pain Score of 4-6, CPOT 3-4  Severe Pain = Pain Score of 7-10, CPOT 5-8     Route: Oral     apixaban (ELIQUIS) tablet 5 mg 5 mg Every 12 Hours Scheduled 4/11/2024 -- A.fib    Admin Instructions: .  Tablet may be crushed and suspended in 60 mL of water or D5W and immediately delivered via NG tube.     Route: Oral     atorvastatin (LIPITOR) tablet 10 mg 10 mg Nightly 4/10/2024 -- Hyperlipidemia    Admin Instructions: Avoid grapefruit juice.     Route: Oral     baclofen (LIORESAL) tablet 10 mg 10 mg 3 Times Daily PRN 5/18/2024 -- PRN muscle spasms    Admin Instructions: .  Take with food if GI upset occurs.     Route: Oral     benzonatate (TESSALON) capsule 100 mg 100 mg 3 Times Daily PRN 1/18/2024 -- PRN cough    Admin Instructions: Do not crush or chew the capsules or tablets. The drug may not work as designed if the  capsule or tablet is crushed or chewed. Swallow whole.  Swallow whole.  Do not crush, chew, or open capsule.     Route: Oral     bismuth subsalicylate (PEPTO BISMOL) chewable tablet 524 mg 524 mg 3 Times Daily PRN 2/5/2024 -- PRN indigestion, heartburn, diarrhea    Admin Instructions: Maximum 4192 mg per 24 hours.     Route: Oral     cetirizine (zyrTEC) tablet 10 mg 10 mg Daily 5/8/2024 -- Allergies    Route: Oral     Cosign for Ordering: Accepted by Jose Maya MD on 5/11/2024  8:46 PM     dextrose (D50W) (25 g/50 mL) IV injection 25 g 25 g Every 15 Minutes PRN 11/6/2023 -- PRN hypoglycemia (blood sugar less than 70)    Admin Instructions: .Blood sugar less than 70; patient has IV access - Unresponsive, NPO or Unable To Safely Swallow     Route: Intravenous     dextrose (GLUTOSE) oral gel 15 g 15 g Every 15 Minutes PRN 11/6/2023 -- PRN hypoglycemia (blood sugar less than 70)    Admin Instructions: .BS<70, Patient Alert, Is not NPO, Can safely swallow.     Route: Oral     Diclofenac Sodium (VOLTAREN) 1 % gel 4 g 4 g Every 6 Hours 5/27/2024 -- Bilateral knee pain    Admin Instructions: Apply to bilateral hip and knee   Do not cover area with occlusive dressing or apply any other med to affected area. Do not wash affected area for 1 hr after applying. Use dosing card for correct dose measurement.     Route: Topical     dimethicone (AVEENO) 1.3 % lotion 1 Application 1 Application User Specified 5/23/2024 -- Dry skin    Admin Instructions: Apply to BLE / feet.     Route: Topical     Cosign for Ordering: Accepted by Jose Maya MD on 5/23/2024  8:12 PM     empagliflozin (JARDIANCE) tablet 10 mg 10 mg Daily 11/7/2023 -- T2DM    Admin Instructions: .     Route: Oral     ferrous sulfate tablet 325 mg 325 mg Daily With Breakfast 11/21/2023 -- Anemia    Admin Instructions: .  Swallow whole. Do not crush, split, or chew. Take with food if GI upset occurs.     Route: Oral     Cosign for Ordering: Accepted by Tyson  Max Pruitt MD on 11/21/2023 11:34 AM     furosemide (LASIX) tablet 40 mg 40 mg Daily 6/6/2024 -- Edema    Admin Instructions: Hold for SBP less than 100, DBP less than 50     Route: Oral     Cosign for Ordering: Accepted by Jose Maya MD on 6/7/2024 10:19 AM     glucagon (GLUCAGEN) injection 1 mg 1 mg Every 15 Minutes PRN 11/6/2023 -- PRN hypoglycemia (blood sugar less than 70)    Admin Instructions: Blood Glucose Less Than 70 - Patient Without IV Access - Unresponsive, NPO or Unable To Safely Swallow.  Reconstitute powder for injection by adding 1 mL of -supplied sterile diluent or sterile water for injection to a vial containing 1 mg of the drug, to provide solutions containing 1 mg/mL. Shake vial gently to dissolve.     Route: Intramuscular     Hydrocortisone (Perianal) (ANUSOL-HC) 2.5 % rectal cream  2 Times Daily PRN 3/20/2024 -- PRN hemorrhoids    Route: Rectal     Cosign for Ordering: Accepted by Cailin Acosta MD on 3/20/2024  4:51 PM     ibuprofen (ADVIL,MOTRIN) tablet 400 mg 400 mg 2 Times Daily PRN 1/16/2024 -- PRN mild pain    Admin Instructions: May alternate with tylenol  Based on patient request - if ordered for moderate or severe pain, provider allows for administration of a medication prescribed for a lower pain scale.    Mucous membrane irritant. Do not crush or chew tablet or capsule unless administered through a feeding tube.  If given for pain, use the following pain scale:  Mild Pain = Pain Score of 1-3, CPOT 1-2  Moderate Pain = Pain Score of 4-6, CPOT 3-4  Severe Pain = Pain Score of 7-10, CPOT 5-8     Route: Oral     Cosign for Ordering: Accepted by Max Mehta MD on 1/17/2024 10:44 AM     insulin glargine (LANTUS, SEMGLEE) injection 22 Units 22 Units Nightly 6/5/2024 -- T2DM    Admin Instructions: Do not hold basal insulin without an order. Consider requesting a dose edit, if needed.       Route: Subcutaneous     Cosign for Ordering: Accepted by Jose Maya  "MD MAX on 6/7/2024 10:19 AM     insulin glargine (LANTUS, SEMGLEE) injection 22 Units 22 Units Daily 7/5/2024 -- T2DM    Admin Instructions: Do not hold basal insulin without an order. Consider requesting a dose edit, if needed.       Route: Subcutaneous     Insulin Lispro (humaLOG) injection 3-14 Units 3-14 Units 4 Times Daily Before Meals & Nightly 5/19/2024 -- T2DM    Admin Instructions: Correction Insulin - Moderate-High Dose (Total Insulin Dose 60-80 units/day, Patient Taking Insulin at Home)    Blood Glucose 150-199 mg/dL - 3 units  Blood Glucose 200-249 mg/dL - 5 units  Blood Glucose 250-299 mg/dL - 8 units  Blood Glucose 300-349 mg/dL - 10 units  Blood Glucose 350-400 mg/dL - 12 units  Blood Glucose Greater Than 400 mg/dL - 14 units & Call Provider   Caution: Look alike/sound alike drug alert     Route: Subcutaneous     Cosign for Ordering: Accepted by Cailin Acosta MD on 5/19/2024  3:01 PM     lisinopril (PRINIVIL,ZESTRIL) tablet 2.5 mg 2.5 mg Every 24 Hours Scheduled 11/7/2023 -- Hypertension    Admin Instructions: Hold for SBP less than 100.     Route: Oral     melatonin tablet 10 mg 10 mg Nightly 4/10/2024 -- Insomnia    Route: Oral     montelukast (SINGULAIR) tablet 10 mg 10 mg Nightly 5/23/2024 -- Allergies/sinus pain    Route: Oral     ondansetron (ZOFRAN) injection 4 mg 4 mg Every 6 Hours PRN 11/6/2023 -- PRN nausea, vomiting    Admin Instructions: .If BOTH ondansetron (ZOFRAN) and promethazine (PHENERGAN) are ordered use ondansetron first and THEN promethazine IF ondansetron is ineffective.     Route: Intravenous     ondansetron ODT (ZOFRAN-ODT) disintegrating tablet 4 mg 4 mg Every 6 Hours PRN 11/6/2023 -- PRN nausea, vomiting    Admin Instructions: .\"If multiple N/V medications ordered, use in the following order: Ondansetron, Prochlorperazine, Promethazine. Use PO unless patient refuses or patient unable to swallow.\"  Place on tongue and allow to dissolve.     Route: Oral     " oxyCODONE-acetaminophen (PERCOCET)  MG per tablet 1 tablet 1 tablet Every 8 Hours PRN 5/19/2024 6/26/2024 PRN severe pain    Admin Instructions: Based on patient request - if ordered for moderate or severe pain, provider allows for administration of a medication prescribed for a lower pain scale.  [NIKIA]    Do not exceed 4 grams of acetaminophen in a 24 hr period. Max dose of 2gm for AST/ALT greater than 120 units/L        If given for pain, use the following pain scale:   Mild Pain = Pain Score of 1-3, CPOT 1-2  Moderate Pain = Pain Score of 4-6, CPOT 3-4  Severe Pain = Pain Score of 7-10, CPOT 5-8     Route: Oral     Pneumococcal 20-Janneth Conj Vacc (PREVNAR-20) vaccine 0.5 mL 0.5 mL During Hospitalization 4/10/2024 -- Preventative, SNF admission requirement    Route: Intramuscular     polyethyl glycol-propyl glycol (SYSTANE) 0.4-0.3 % ophthalmic solution (artificial tears) 1 drop 1 drop Every 1 Hour PRN 5/15/2024 -- PRN dry eyes    Route: Both Eyes     Cosign for Ordering: Accepted by Cailin Acosta MD on 5/15/2024  2:47 PM     pregabalin (LYRICA) capsule 100 mg 100 mg 3 Times Daily 11/7/2023 -- Neuropathic pain    Admin Instructions: .       Route: Oral     saccharomyces boulardii (FLORASTOR) capsule 500 mg 500 mg 2 Times Daily 2/3/2024 -- Gut health, probiotic    Route: Oral     sertraline (ZOLOFT) tablet 50 mg 50 mg Nightly 2/18/2024 -- Anxiety disorder    Route: Oral     simethicone (MYLICON) chewable tablet 80 mg 80 mg 4 Times Daily PRN 11/6/2023 -- PRN flatulence    Admin Instructions: .     Route: Oral     sodium chloride 0.9 % flush 10 mL 10 mL As Needed 11/6/2023 -- Line care    Route: Intravenous     sodium chloride 0.9 % infusion 40 mL 40 mL As Needed 11/6/2023 -- Line care    Admin Instructions: Following administration of an IV intermittent medication, flush line with 40mL NS at 100mL/hr.     Route: Intravenous     vitamin B-12 (CYANOCOBALAMIN) tablet 1,000 mcg 1,000 mcg Daily 11/7/2023 --  Supplement, anemia    Admin Instructions: .     Route: Oral            Psychotropic Medications  Sertaline  Pregabalin    Potentially Clinically Significant Medication Issues/PharmD Recommendations:   Psychotropic Medication: Sertaline, pregabalin  Opioid Use Review: Oxycodone/APAP - approximately two to three doses daily administered on average.  No bowel regimen in place, but pt has had consistent bowel movements. Continue to monitor and consider addition of PRN bowel regimen.    Medication without an appropriate indication: n/a  Untreated indication: Pt has hx of GERD, evaluate if treatment indicated at this time.   Missing Duration: n/a  Excessive or Inadequate Dose: n/a  Ineffective Drug Therapy: n/a  Medication Adverse Reactions/Consequences: n/a  Medication Monitoring: n/a  Drug Interactions: Apixaban and ibuprofen -- monitor for increased bleed risk (pt also on diclofenac topical scheduled which has some systemic absorption).  Per [facility], concurrent use of diclofenac topical with systemic nonsteroidal anti-inflammatory drugs (NSAIDs) is generally not recommended as topical treatment is unlikely to provide added pain relief.Per evaluated  Duplicate Therapy: n/a  PTA Medication Omissions (not currently ordered): n/a  Safety: n/a    In completing this Drug Regimen Review for [facility], Luisa Mccabe [facility] have reviewed the electronic medical chart contents, including but not limited to the following: Medication Administration Records (MAR), Prescriber's orders, Progress, nursing and consultants' notes, Laboratory and diagnostic test results, Other sources of information about documented expressions or indications of distress and/or changes in condition.

## 2024-07-17 NOTE — PLAN OF CARE
Goal Outcome Evaluation:            Patient refused hygiene care and to stand to be weighed. VSS. AAOx4. BS WNL throughout shift. Requested pain medication Q1 this shift. Will continue with plan of care.

## 2024-07-18 LAB
GLUCOSE BLDC GLUCOMTR-MCNC: 112 MG/DL (ref 70–99)
GLUCOSE BLDC GLUCOMTR-MCNC: 146 MG/DL (ref 70–99)
GLUCOSE BLDC GLUCOMTR-MCNC: 192 MG/DL (ref 70–99)
GLUCOSE BLDC GLUCOMTR-MCNC: 85 MG/DL (ref 70–99)

## 2024-07-18 PROCEDURE — 63710000001 INSULIN GLARGINE PER 5 UNITS: Performed by: PHYSICIAN ASSISTANT

## 2024-07-18 PROCEDURE — 82948 REAGENT STRIP/BLOOD GLUCOSE: CPT

## 2024-07-18 PROCEDURE — 63710000001 INSULIN LISPRO (HUMAN) PER 5 UNITS: Performed by: PHYSICIAN ASSISTANT

## 2024-07-18 RX ADMIN — INSULIN GLARGINE 22 UNITS: 100 INJECTION, SOLUTION SUBCUTANEOUS at 21:32

## 2024-07-18 RX ADMIN — OXYCODONE AND ACETAMINOPHEN 1 TABLET: 10; 325 TABLET ORAL at 04:20

## 2024-07-18 RX ADMIN — BACLOFEN 10 MG: 10 TABLET ORAL at 04:20

## 2024-07-18 RX ADMIN — DICLOFENAC SODIUM 4 G: 10 GEL TOPICAL at 08:16

## 2024-07-18 RX ADMIN — PREGABALIN 100 MG: 100 CAPSULE ORAL at 08:15

## 2024-07-18 RX ADMIN — INSULIN GLARGINE 22 UNITS: 100 INJECTION, SOLUTION SUBCUTANEOUS at 08:16

## 2024-07-18 RX ADMIN — CYANOCOBALAMIN TAB 500 MCG 1000 MCG: 500 TAB at 08:15

## 2024-07-18 RX ADMIN — EMPAGLIFLOZIN 10 MG: 10 TABLET, FILM COATED ORAL at 08:16

## 2024-07-18 RX ADMIN — FERROUS SULFATE TAB 325 MG (65 MG ELEMENTAL FE) 325 MG: 325 (65 FE) TAB at 08:16

## 2024-07-18 RX ADMIN — BACLOFEN 10 MG: 10 TABLET ORAL at 12:50

## 2024-07-18 RX ADMIN — MONTELUKAST 10 MG: 10 TABLET, FILM COATED ORAL at 21:33

## 2024-07-18 RX ADMIN — LISINOPRIL 2.5 MG: 2.5 TABLET ORAL at 08:16

## 2024-07-18 RX ADMIN — OXYCODONE AND ACETAMINOPHEN 1 TABLET: 10; 325 TABLET ORAL at 12:50

## 2024-07-18 RX ADMIN — DICLOFENAC SODIUM 4 G: 10 GEL TOPICAL at 16:29

## 2024-07-18 RX ADMIN — ATORVASTATIN CALCIUM 10 MG: 10 TABLET, FILM COATED ORAL at 21:35

## 2024-07-18 RX ADMIN — APIXABAN 5 MG: 5 TABLET, FILM COATED ORAL at 08:16

## 2024-07-18 RX ADMIN — IBUPROFEN 400 MG: 400 TABLET ORAL at 18:27

## 2024-07-18 RX ADMIN — PREGABALIN 100 MG: 100 CAPSULE ORAL at 16:29

## 2024-07-18 RX ADMIN — PREGABALIN 100 MG: 100 CAPSULE ORAL at 21:34

## 2024-07-18 RX ADMIN — SERTRALINE HYDROCHLORIDE 50 MG: 50 TABLET ORAL at 21:34

## 2024-07-18 RX ADMIN — CETIRIZINE HYDROCHLORIDE 10 MG: 10 TABLET, FILM COATED ORAL at 08:16

## 2024-07-18 RX ADMIN — DICLOFENAC SODIUM 4 G: 10 GEL TOPICAL at 21:32

## 2024-07-18 RX ADMIN — OXYCODONE AND ACETAMINOPHEN 1 TABLET: 10; 325 TABLET ORAL at 21:34

## 2024-07-18 RX ADMIN — BACLOFEN 10 MG: 10 TABLET ORAL at 21:34

## 2024-07-18 RX ADMIN — Medication 10 MG: at 21:33

## 2024-07-18 RX ADMIN — APIXABAN 5 MG: 5 TABLET, FILM COATED ORAL at 21:34

## 2024-07-18 RX ADMIN — DICLOFENAC SODIUM 4 G: 10 GEL TOPICAL at 02:50

## 2024-07-18 RX ADMIN — INSULIN LISPRO 3 UNITS: 100 INJECTION, SOLUTION INTRAVENOUS; SUBCUTANEOUS at 21:32

## 2024-07-18 RX ADMIN — FUROSEMIDE 40 MG: 40 TABLET ORAL at 08:16

## 2024-07-18 NOTE — PLAN OF CARE
Goal Outcome Evaluation:  Plan of Care Reviewed With: patient        Progress: no change  Outcome Evaluation: resident alert, oriented, able to make needs known to staff. Remains bedbound. did exercises in bed with staff today. refused skin care to BLLE. Blood glucose stable for BF and lunch, elevated for dinner, covered with sliding scale. continues with incontinence of urine and bowel. received new orders for increase in Ozempic, starting on Wednesday to achieve wt loss required for surgery. Resident doordashed from [facility] for dinner, declined hospital dinner. Will continue current plan of care.

## 2024-07-18 NOTE — PLAN OF CARE
Goal Outcome Evaluation:  Plan of Care Reviewed With: patient        Progress: no change  Outcome Evaluation: Resident alert, oriented, able to make needs known to staff. Remains bedbound, refused to get weight this am, participated with theraband exercises in bed this afternoon. Bloodglucose stable for all 3 meals. Doordashed [employer] for dinner with large milkshake. Visitors at  this aftrernoon. Resident conversant with visitors and staff. Will continue current POC

## 2024-07-19 LAB
GLUCOSE BLDC GLUCOMTR-MCNC: 118 MG/DL (ref 70–99)
GLUCOSE BLDC GLUCOMTR-MCNC: 138 MG/DL (ref 70–99)
GLUCOSE BLDC GLUCOMTR-MCNC: 143 MG/DL (ref 70–99)
GLUCOSE BLDC GLUCOMTR-MCNC: 146 MG/DL (ref 70–99)

## 2024-07-19 PROCEDURE — 63710000001 INSULIN GLARGINE PER 5 UNITS: Performed by: PHYSICIAN ASSISTANT

## 2024-07-19 PROCEDURE — 82948 REAGENT STRIP/BLOOD GLUCOSE: CPT

## 2024-07-19 RX ORDER — FUROSEMIDE 40 MG
40 TABLET ORAL
Status: DISPENSED | OUTPATIENT
Start: 2024-07-19

## 2024-07-19 RX ADMIN — DICLOFENAC SODIUM 4 G: 10 GEL TOPICAL at 20:27

## 2024-07-19 RX ADMIN — MONTELUKAST 10 MG: 10 TABLET, FILM COATED ORAL at 20:25

## 2024-07-19 RX ADMIN — INSULIN GLARGINE 22 UNITS: 100 INJECTION, SOLUTION SUBCUTANEOUS at 20:26

## 2024-07-19 RX ADMIN — PREGABALIN 100 MG: 100 CAPSULE ORAL at 16:01

## 2024-07-19 RX ADMIN — BACLOFEN 10 MG: 10 TABLET ORAL at 23:45

## 2024-07-19 RX ADMIN — CYANOCOBALAMIN TAB 500 MCG 1000 MCG: 500 TAB at 08:55

## 2024-07-19 RX ADMIN — LISINOPRIL 2.5 MG: 2.5 TABLET ORAL at 08:55

## 2024-07-19 RX ADMIN — PREGABALIN 100 MG: 100 CAPSULE ORAL at 20:26

## 2024-07-19 RX ADMIN — DICLOFENAC SODIUM 4 G: 10 GEL TOPICAL at 08:55

## 2024-07-19 RX ADMIN — ACETAMINOPHEN 650 MG: 325 TABLET ORAL at 03:58

## 2024-07-19 RX ADMIN — DICLOFENAC SODIUM 4 G: 10 GEL TOPICAL at 14:50

## 2024-07-19 RX ADMIN — APIXABAN 5 MG: 5 TABLET, FILM COATED ORAL at 08:45

## 2024-07-19 RX ADMIN — FUROSEMIDE 40 MG: 40 TABLET ORAL at 18:18

## 2024-07-19 RX ADMIN — INSULIN GLARGINE 22 UNITS: 100 INJECTION, SOLUTION SUBCUTANEOUS at 08:55

## 2024-07-19 RX ADMIN — FUROSEMIDE 40 MG: 40 TABLET ORAL at 08:55

## 2024-07-19 RX ADMIN — EMPAGLIFLOZIN 10 MG: 10 TABLET, FILM COATED ORAL at 08:55

## 2024-07-19 RX ADMIN — BACLOFEN 10 MG: 10 TABLET ORAL at 15:18

## 2024-07-19 RX ADMIN — OXYCODONE AND ACETAMINOPHEN 1 TABLET: 10; 325 TABLET ORAL at 15:18

## 2024-07-19 RX ADMIN — OXYCODONE AND ACETAMINOPHEN 1 TABLET: 10; 325 TABLET ORAL at 05:42

## 2024-07-19 RX ADMIN — PREGABALIN 100 MG: 100 CAPSULE ORAL at 08:55

## 2024-07-19 RX ADMIN — ATORVASTATIN CALCIUM 10 MG: 10 TABLET, FILM COATED ORAL at 20:25

## 2024-07-19 RX ADMIN — Medication 10 MG: at 20:25

## 2024-07-19 RX ADMIN — OXYCODONE AND ACETAMINOPHEN 1 TABLET: 10; 325 TABLET ORAL at 23:45

## 2024-07-19 RX ADMIN — FERROUS SULFATE TAB 325 MG (65 MG ELEMENTAL FE) 325 MG: 325 (65 FE) TAB at 08:55

## 2024-07-19 RX ADMIN — BACLOFEN 10 MG: 10 TABLET ORAL at 05:43

## 2024-07-19 RX ADMIN — SERTRALINE HYDROCHLORIDE 50 MG: 50 TABLET ORAL at 20:26

## 2024-07-19 RX ADMIN — ACETAMINOPHEN 650 MG: 325 TABLET ORAL at 20:24

## 2024-07-19 RX ADMIN — CETIRIZINE HYDROCHLORIDE 10 MG: 10 TABLET, FILM COATED ORAL at 08:55

## 2024-07-19 RX ADMIN — APIXABAN 5 MG: 5 TABLET, FILM COATED ORAL at 20:26

## 2024-07-19 NOTE — PLAN OF CARE
Goal Outcome Evaluation:  Plan of Care Reviewed With: patient        Progress: no change  Outcome Evaluation: Resident alert, oriented, able to make needs known to staff. remains bedbound. Medicated for prn pain x1 with percocet and baclofen, x1 with ibuprofen, effective. Resident states effevtiveness does not last long. Bloodglucose stable for all meals. Resident doordashed for dinner. c/o increased swelling to rt leg, refuses for nurse to touch and evaluate for pitting edema, but rt thigh does appear somewhat bigger than left. New orders received for increase in Lasix. Will continue current plan of care.

## 2024-07-19 NOTE — PLAN OF CARE
"Goal Outcome Evaluation:  Plan of Care Reviewed With: patient        Progress: no change  Outcome Evaluation: RSD AAOX4, makes needs known to staff, eating a lot of take out foods ,\" states the doctors arent going to to do surgery anyway so why bother. I'll eat whatever I want .\" continues to require pain medicinefor hip and leg pain.                               "

## 2024-07-20 LAB
ALBUMIN SERPL-MCNC: 2.9 G/DL (ref 3.5–5.2)
ANION GAP SERPL CALCULATED.3IONS-SCNC: 6.3 MMOL/L (ref 5–15)
BUN SERPL-MCNC: 16 MG/DL (ref 8–23)
BUN/CREAT SERPL: 33.3 (ref 7–25)
CALCIUM SPEC-SCNC: 8.1 MG/DL (ref 8.6–10.5)
CHLORIDE SERPL-SCNC: 107 MMOL/L (ref 98–107)
CO2 SERPL-SCNC: 26.7 MMOL/L (ref 22–29)
CREAT SERPL-MCNC: 0.48 MG/DL (ref 0.76–1.27)
EGFRCR SERPLBLD CKD-EPI 2021: 114.6 ML/MIN/1.73
GLUCOSE BLDC GLUCOMTR-MCNC: 134 MG/DL (ref 70–99)
GLUCOSE BLDC GLUCOMTR-MCNC: 146 MG/DL (ref 70–99)
GLUCOSE BLDC GLUCOMTR-MCNC: 150 MG/DL (ref 70–99)
GLUCOSE BLDC GLUCOMTR-MCNC: 98 MG/DL (ref 70–99)
GLUCOSE SERPL-MCNC: 121 MG/DL (ref 65–99)
PHOSPHATE SERPL-MCNC: 4.1 MG/DL (ref 2.5–4.5)
POTASSIUM SERPL-SCNC: 3.9 MMOL/L (ref 3.5–5.2)
SODIUM SERPL-SCNC: 140 MMOL/L (ref 136–145)
WHOLE BLOOD HOLD SPECIMEN: NORMAL

## 2024-07-20 PROCEDURE — 80069 RENAL FUNCTION PANEL: CPT | Performed by: PHYSICIAN ASSISTANT

## 2024-07-20 PROCEDURE — 63710000001 INSULIN GLARGINE PER 5 UNITS: Performed by: PHYSICIAN ASSISTANT

## 2024-07-20 PROCEDURE — 82948 REAGENT STRIP/BLOOD GLUCOSE: CPT

## 2024-07-20 PROCEDURE — 63710000001 INSULIN LISPRO (HUMAN) PER 5 UNITS: Performed by: PHYSICIAN ASSISTANT

## 2024-07-20 RX ADMIN — DICLOFENAC SODIUM 4 G: 10 GEL TOPICAL at 13:53

## 2024-07-20 RX ADMIN — OXYCODONE AND ACETAMINOPHEN 1 TABLET: 10; 325 TABLET ORAL at 19:01

## 2024-07-20 RX ADMIN — OXYCODONE AND ACETAMINOPHEN 1 TABLET: 10; 325 TABLET ORAL at 10:05

## 2024-07-20 RX ADMIN — IBUPROFEN 400 MG: 400 TABLET ORAL at 04:31

## 2024-07-20 RX ADMIN — PREGABALIN 100 MG: 100 CAPSULE ORAL at 15:55

## 2024-07-20 RX ADMIN — CETIRIZINE HYDROCHLORIDE 10 MG: 10 TABLET, FILM COATED ORAL at 10:05

## 2024-07-20 RX ADMIN — APIXABAN 5 MG: 5 TABLET, FILM COATED ORAL at 20:27

## 2024-07-20 RX ADMIN — APIXABAN 5 MG: 5 TABLET, FILM COATED ORAL at 10:05

## 2024-07-20 RX ADMIN — DICLOFENAC SODIUM 4 G: 10 GEL TOPICAL at 01:52

## 2024-07-20 RX ADMIN — PREGABALIN 100 MG: 100 CAPSULE ORAL at 10:05

## 2024-07-20 RX ADMIN — Medication 1 APPLICATION: at 15:55

## 2024-07-20 RX ADMIN — EMPAGLIFLOZIN 10 MG: 10 TABLET, FILM COATED ORAL at 10:05

## 2024-07-20 RX ADMIN — INSULIN GLARGINE 22 UNITS: 100 INJECTION, SOLUTION SUBCUTANEOUS at 20:28

## 2024-07-20 RX ADMIN — DICLOFENAC SODIUM 4 G: 10 GEL TOPICAL at 20:29

## 2024-07-20 RX ADMIN — Medication 10 MG: at 20:26

## 2024-07-20 RX ADMIN — INSULIN LISPRO 3 UNITS: 100 INJECTION, SOLUTION INTRAVENOUS; SUBCUTANEOUS at 20:28

## 2024-07-20 RX ADMIN — BACLOFEN 10 MG: 10 TABLET ORAL at 10:04

## 2024-07-20 RX ADMIN — ATORVASTATIN CALCIUM 10 MG: 10 TABLET, FILM COATED ORAL at 20:26

## 2024-07-20 RX ADMIN — IBUPROFEN 400 MG: 400 TABLET ORAL at 15:55

## 2024-07-20 RX ADMIN — INSULIN GLARGINE 22 UNITS: 100 INJECTION, SOLUTION SUBCUTANEOUS at 10:07

## 2024-07-20 RX ADMIN — BACLOFEN 10 MG: 10 TABLET ORAL at 19:01

## 2024-07-20 RX ADMIN — SERTRALINE HYDROCHLORIDE 50 MG: 50 TABLET ORAL at 20:27

## 2024-07-20 RX ADMIN — PREGABALIN 100 MG: 100 CAPSULE ORAL at 20:26

## 2024-07-20 RX ADMIN — FERROUS SULFATE TAB 325 MG (65 MG ELEMENTAL FE) 325 MG: 325 (65 FE) TAB at 10:05

## 2024-07-20 RX ADMIN — CYANOCOBALAMIN TAB 500 MCG 1000 MCG: 500 TAB at 10:05

## 2024-07-20 RX ADMIN — MONTELUKAST 10 MG: 10 TABLET, FILM COATED ORAL at 20:27

## 2024-07-20 NOTE — PLAN OF CARE
Goal Outcome Evaluation:  Plan of Care Reviewed With: patient        Progress: improving  Outcome Evaluation: Pt AO x 4 and VSS, remains bedbound. He was medicated for pain with Tylenol, then Baclofen and Percocet, then Ibuprofen. He used the bedpan and urinal and call out for assistance frequently. Will continue with his plan of care. Call light and personal items within reach.

## 2024-07-20 NOTE — PLAN OF CARE
Goal Outcome Evaluation:  Plan of Care Reviewed With: patient        Progress: no change  Outcome Evaluation: Pt A&O x 4, VSS, c/o moderate to severe pain to hip, administered prn pain medication. Pt uses urinal and bedpan, has refused turning. Call light in reach.

## 2024-07-21 LAB
GLUCOSE BLDC GLUCOMTR-MCNC: 118 MG/DL (ref 70–99)
GLUCOSE BLDC GLUCOMTR-MCNC: 121 MG/DL (ref 70–99)
GLUCOSE BLDC GLUCOMTR-MCNC: 129 MG/DL (ref 70–99)
GLUCOSE BLDC GLUCOMTR-MCNC: 183 MG/DL (ref 70–99)
GLUCOSE BLDC GLUCOMTR-MCNC: 217 MG/DL (ref 70–99)

## 2024-07-21 PROCEDURE — 63710000001 INSULIN LISPRO (HUMAN) PER 5 UNITS: Performed by: PHYSICIAN ASSISTANT

## 2024-07-21 PROCEDURE — 82948 REAGENT STRIP/BLOOD GLUCOSE: CPT

## 2024-07-21 PROCEDURE — 63710000001 INSULIN GLARGINE PER 5 UNITS: Performed by: PHYSICIAN ASSISTANT

## 2024-07-21 RX ADMIN — SERTRALINE HYDROCHLORIDE 50 MG: 50 TABLET ORAL at 20:38

## 2024-07-21 RX ADMIN — OXYCODONE AND ACETAMINOPHEN 1 TABLET: 10; 325 TABLET ORAL at 21:41

## 2024-07-21 RX ADMIN — INSULIN GLARGINE 22 UNITS: 100 INJECTION, SOLUTION SUBCUTANEOUS at 08:29

## 2024-07-21 RX ADMIN — BACLOFEN 10 MG: 10 TABLET ORAL at 21:42

## 2024-07-21 RX ADMIN — BACLOFEN 10 MG: 10 TABLET ORAL at 03:10

## 2024-07-21 RX ADMIN — FUROSEMIDE 40 MG: 40 TABLET ORAL at 18:41

## 2024-07-21 RX ADMIN — ACETAMINOPHEN 650 MG: 325 TABLET ORAL at 08:29

## 2024-07-21 RX ADMIN — LISINOPRIL 2.5 MG: 2.5 TABLET ORAL at 08:29

## 2024-07-21 RX ADMIN — ATORVASTATIN CALCIUM 10 MG: 10 TABLET, FILM COATED ORAL at 20:38

## 2024-07-21 RX ADMIN — INSULIN GLARGINE 22 UNITS: 100 INJECTION, SOLUTION SUBCUTANEOUS at 20:39

## 2024-07-21 RX ADMIN — PREGABALIN 100 MG: 100 CAPSULE ORAL at 20:39

## 2024-07-21 RX ADMIN — CYANOCOBALAMIN TAB 500 MCG 1000 MCG: 500 TAB at 08:29

## 2024-07-21 RX ADMIN — IBUPROFEN 400 MG: 400 TABLET ORAL at 06:20

## 2024-07-21 RX ADMIN — DICLOFENAC SODIUM 4 G: 10 GEL TOPICAL at 20:41

## 2024-07-21 RX ADMIN — INSULIN LISPRO 3 UNITS: 100 INJECTION, SOLUTION INTRAVENOUS; SUBCUTANEOUS at 20:40

## 2024-07-21 RX ADMIN — DICLOFENAC SODIUM 4 G: 10 GEL TOPICAL at 03:10

## 2024-07-21 RX ADMIN — MONTELUKAST 10 MG: 10 TABLET, FILM COATED ORAL at 20:38

## 2024-07-21 RX ADMIN — DICLOFENAC SODIUM 4 G: 10 GEL TOPICAL at 08:31

## 2024-07-21 RX ADMIN — PREGABALIN 100 MG: 100 CAPSULE ORAL at 16:48

## 2024-07-21 RX ADMIN — APIXABAN 5 MG: 5 TABLET, FILM COATED ORAL at 20:37

## 2024-07-21 RX ADMIN — EMPAGLIFLOZIN 10 MG: 10 TABLET, FILM COATED ORAL at 08:29

## 2024-07-21 RX ADMIN — Medication 10 MG: at 20:37

## 2024-07-21 RX ADMIN — Medication 1 APPLICATION: at 14:26

## 2024-07-21 RX ADMIN — APIXABAN 5 MG: 5 TABLET, FILM COATED ORAL at 08:30

## 2024-07-21 RX ADMIN — DICLOFENAC SODIUM 4 G: 10 GEL TOPICAL at 14:26

## 2024-07-21 RX ADMIN — FERROUS SULFATE TAB 325 MG (65 MG ELEMENTAL FE) 325 MG: 325 (65 FE) TAB at 08:29

## 2024-07-21 RX ADMIN — BACLOFEN 10 MG: 10 TABLET ORAL at 11:20

## 2024-07-21 RX ADMIN — CETIRIZINE HYDROCHLORIDE 10 MG: 10 TABLET, FILM COATED ORAL at 08:29

## 2024-07-21 RX ADMIN — ACETAMINOPHEN 650 MG: 325 TABLET ORAL at 00:00

## 2024-07-21 RX ADMIN — OXYCODONE AND ACETAMINOPHEN 1 TABLET: 10; 325 TABLET ORAL at 11:20

## 2024-07-21 RX ADMIN — FUROSEMIDE 40 MG: 40 TABLET ORAL at 08:29

## 2024-07-21 RX ADMIN — OXYCODONE AND ACETAMINOPHEN 1 TABLET: 10; 325 TABLET ORAL at 03:11

## 2024-07-21 RX ADMIN — PREGABALIN 100 MG: 100 CAPSULE ORAL at 08:29

## 2024-07-21 NOTE — PLAN OF CARE
Goal Outcome Evaluation:  Plan of Care Reviewed With: patient        Progress: improving  Outcome Evaluation: Pt AO x 4 and VSS. No significant changes this shift. He was medicated x 3 for left hip pain. He continues to use the urinal and bedpan and refuses turns. Will continue with his plan of care. Call light and personal items within reach.

## 2024-07-22 LAB
GLUCOSE BLDC GLUCOMTR-MCNC: 109 MG/DL (ref 70–99)
GLUCOSE BLDC GLUCOMTR-MCNC: 132 MG/DL (ref 70–99)
GLUCOSE BLDC GLUCOMTR-MCNC: 190 MG/DL (ref 70–99)
GLUCOSE BLDC GLUCOMTR-MCNC: 94 MG/DL (ref 70–99)

## 2024-07-22 PROCEDURE — 82948 REAGENT STRIP/BLOOD GLUCOSE: CPT

## 2024-07-22 PROCEDURE — 63710000001 ONDANSETRON ODT 4 MG TABLET DISPERSIBLE: Performed by: PHYSICIAN ASSISTANT

## 2024-07-22 PROCEDURE — 63710000001 INSULIN LISPRO (HUMAN) PER 5 UNITS: Performed by: PHYSICIAN ASSISTANT

## 2024-07-22 PROCEDURE — 63710000001 INSULIN GLARGINE PER 5 UNITS: Performed by: PHYSICIAN ASSISTANT

## 2024-07-22 RX ADMIN — OXYCODONE AND ACETAMINOPHEN 1 TABLET: 10; 325 TABLET ORAL at 05:29

## 2024-07-22 RX ADMIN — EMPAGLIFLOZIN 10 MG: 10 TABLET, FILM COATED ORAL at 09:45

## 2024-07-22 RX ADMIN — PREGABALIN 100 MG: 100 CAPSULE ORAL at 16:08

## 2024-07-22 RX ADMIN — OXYCODONE AND ACETAMINOPHEN 1 TABLET: 10; 325 TABLET ORAL at 13:57

## 2024-07-22 RX ADMIN — INSULIN GLARGINE 22 UNITS: 100 INJECTION, SOLUTION SUBCUTANEOUS at 21:55

## 2024-07-22 RX ADMIN — LISINOPRIL 2.5 MG: 2.5 TABLET ORAL at 09:44

## 2024-07-22 RX ADMIN — MONTELUKAST 10 MG: 10 TABLET, FILM COATED ORAL at 21:56

## 2024-07-22 RX ADMIN — IBUPROFEN 400 MG: 400 TABLET ORAL at 00:18

## 2024-07-22 RX ADMIN — BACLOFEN 10 MG: 10 TABLET ORAL at 21:56

## 2024-07-22 RX ADMIN — FUROSEMIDE 40 MG: 40 TABLET ORAL at 17:40

## 2024-07-22 RX ADMIN — DICLOFENAC SODIUM 4 G: 10 GEL TOPICAL at 09:45

## 2024-07-22 RX ADMIN — OXYCODONE AND ACETAMINOPHEN 1 TABLET: 10; 325 TABLET ORAL at 21:56

## 2024-07-22 RX ADMIN — CYANOCOBALAMIN TAB 500 MCG 1000 MCG: 500 TAB at 09:45

## 2024-07-22 RX ADMIN — FERROUS SULFATE TAB 325 MG (65 MG ELEMENTAL FE) 325 MG: 325 (65 FE) TAB at 09:44

## 2024-07-22 RX ADMIN — DICLOFENAC SODIUM 4 G: 10 GEL TOPICAL at 16:08

## 2024-07-22 RX ADMIN — INSULIN GLARGINE 22 UNITS: 100 INJECTION, SOLUTION SUBCUTANEOUS at 09:45

## 2024-07-22 RX ADMIN — Medication 10 MG: at 21:56

## 2024-07-22 RX ADMIN — BACLOFEN 10 MG: 10 TABLET ORAL at 05:29

## 2024-07-22 RX ADMIN — APIXABAN 5 MG: 5 TABLET, FILM COATED ORAL at 09:45

## 2024-07-22 RX ADMIN — APIXABAN 5 MG: 5 TABLET, FILM COATED ORAL at 21:56

## 2024-07-22 RX ADMIN — CETIRIZINE HYDROCHLORIDE 10 MG: 10 TABLET, FILM COATED ORAL at 09:44

## 2024-07-22 RX ADMIN — ONDANSETRON 4 MG: 4 TABLET, ORALLY DISINTEGRATING ORAL at 12:21

## 2024-07-22 RX ADMIN — ATORVASTATIN CALCIUM 10 MG: 10 TABLET, FILM COATED ORAL at 21:56

## 2024-07-22 RX ADMIN — PREGABALIN 100 MG: 100 CAPSULE ORAL at 09:45

## 2024-07-22 RX ADMIN — DICLOFENAC SODIUM 4 G: 10 GEL TOPICAL at 02:17

## 2024-07-22 RX ADMIN — INSULIN LISPRO 3 UNITS: 100 INJECTION, SOLUTION INTRAVENOUS; SUBCUTANEOUS at 21:56

## 2024-07-22 RX ADMIN — PREGABALIN 100 MG: 100 CAPSULE ORAL at 21:56

## 2024-07-22 RX ADMIN — SERTRALINE HYDROCHLORIDE 50 MG: 50 TABLET ORAL at 21:56

## 2024-07-22 RX ADMIN — BACLOFEN 10 MG: 10 TABLET ORAL at 13:57

## 2024-07-22 NOTE — PLAN OF CARE
Goal Outcome Evaluation:  Plan of Care Reviewed With: patient           Outcome Evaluation: Patient is alert and oriented. Encouraged patient to get up in chair this shift, PT recommended claude lift. Patient refuses at this time. Patient still having several formed soft bowel movements. Patient requesting podiatry, [facility] unable to see patient per attending MD, and Dr Jones and Sandoval's office are not seeing him as well. Attending MD mentioned the patient going to a podiatrist out of the hospital and coming back, patient told about this but he refused. MD made aware. No significant changes. VSS, able to take second dose of lasix in the evening.                               Problem: Adult Inpatient Plan of Care  Goal: Plan of Care Review  Recent Flowsheet Documentation  Taken 7/22/2024 1801 by Sarah Evangelista RN  Plan of Care Reviewed With: patient  Outcome Evaluation: Patient is alert and oriented. Encouraged patient to get up in chair this shift, PT recommended claude lift. Patient refuses at this time. Patient still having several formed soft bowel movements. Patient requesting podiatry, [facility] unable to see patient per attending MD, and Dr Jones and [facility]oval's office are not seeing him as well. Attending MD mentioned the patient going to a podiatrist out of the hospital and coming back, patient told about this but he refused. MD made aware. No significant changes. VSS, able to take second dose of lasix in the evening.  7/22/2024 1439 by Sarah Evangelista, RN  Outcome: Unable to Meet, Plan Revised  Goal: Patient-Specific Goal (Individualized)  Outcome: Unable to Meet, Plan Revised  Goal: Absence of Hospital-Acquired Illness or Injury  Outcome: Unable to Meet, Plan Revised  Goal: Optimal Comfort and Wellbeing  Outcome: Unable to Meet, Plan Revised  Goal: Readiness for Transition of Care  Outcome: Unable to Meet, Plan Revised

## 2024-07-22 NOTE — PLAN OF CARE
Goal Outcome Evaluation:  Pt has had no change in medical status since last plan of care was completed.  Pt is not appropriate for skilled care at this time.  Recommend continued ROM and positioning from the nursing staff and should OOB activity be necessary recommend Cyndy lift.

## 2024-07-22 NOTE — PLAN OF CARE
Goal Outcome Evaluation:  Plan of Care Reviewed With: patient        Progress: improving  Outcome Evaluation: Pt is AO x4 and VSS. He continues to use the bedpan and urinal and refuses turns. Medicated x3 for pain in lefft hip and shoulder. He was awake most of the night. Blood sugar was 183. Will continue with his plan of care. Call light within reach.

## 2024-07-23 LAB
GLUCOSE BLDC GLUCOMTR-MCNC: 162 MG/DL (ref 70–99)
GLUCOSE BLDC GLUCOMTR-MCNC: 178 MG/DL (ref 70–99)
GLUCOSE BLDC GLUCOMTR-MCNC: 224 MG/DL (ref 70–99)
GLUCOSE BLDC GLUCOMTR-MCNC: 95 MG/DL (ref 70–99)

## 2024-07-23 PROCEDURE — 63710000001 INSULIN GLARGINE PER 5 UNITS: Performed by: PHYSICIAN ASSISTANT

## 2024-07-23 PROCEDURE — 63710000001 INSULIN LISPRO (HUMAN) PER 5 UNITS: Performed by: PHYSICIAN ASSISTANT

## 2024-07-23 PROCEDURE — 82948 REAGENT STRIP/BLOOD GLUCOSE: CPT

## 2024-07-23 RX ADMIN — INSULIN LISPRO 3 UNITS: 100 INJECTION, SOLUTION INTRAVENOUS; SUBCUTANEOUS at 12:15

## 2024-07-23 RX ADMIN — BACLOFEN 10 MG: 10 TABLET ORAL at 15:46

## 2024-07-23 RX ADMIN — OXYCODONE AND ACETAMINOPHEN 1 TABLET: 10; 325 TABLET ORAL at 06:13

## 2024-07-23 RX ADMIN — IBUPROFEN 400 MG: 400 TABLET ORAL at 02:08

## 2024-07-23 RX ADMIN — DICLOFENAC SODIUM 4 G: 10 GEL TOPICAL at 02:09

## 2024-07-23 RX ADMIN — EMPAGLIFLOZIN 10 MG: 10 TABLET, FILM COATED ORAL at 09:01

## 2024-07-23 RX ADMIN — APIXABAN 5 MG: 5 TABLET, FILM COATED ORAL at 09:02

## 2024-07-23 RX ADMIN — APIXABAN 5 MG: 5 TABLET, FILM COATED ORAL at 20:30

## 2024-07-23 RX ADMIN — DICLOFENAC SODIUM 4 G: 10 GEL TOPICAL at 15:46

## 2024-07-23 RX ADMIN — Medication 10 MG: at 20:30

## 2024-07-23 RX ADMIN — BACLOFEN 10 MG: 10 TABLET ORAL at 23:55

## 2024-07-23 RX ADMIN — INSULIN LISPRO 3 UNITS: 100 INJECTION, SOLUTION INTRAVENOUS; SUBCUTANEOUS at 17:47

## 2024-07-23 RX ADMIN — DICLOFENAC SODIUM 4 G: 10 GEL TOPICAL at 20:37

## 2024-07-23 RX ADMIN — OXYCODONE AND ACETAMINOPHEN 1 TABLET: 10; 325 TABLET ORAL at 15:46

## 2024-07-23 RX ADMIN — FERROUS SULFATE TAB 325 MG (65 MG ELEMENTAL FE) 325 MG: 325 (65 FE) TAB at 09:00

## 2024-07-23 RX ADMIN — CETIRIZINE HYDROCHLORIDE 10 MG: 10 TABLET, FILM COATED ORAL at 09:02

## 2024-07-23 RX ADMIN — IBUPROFEN 400 MG: 400 TABLET ORAL at 22:53

## 2024-07-23 RX ADMIN — INSULIN GLARGINE 22 UNITS: 100 INJECTION, SOLUTION SUBCUTANEOUS at 20:30

## 2024-07-23 RX ADMIN — CYANOCOBALAMIN TAB 500 MCG 1000 MCG: 500 TAB at 09:02

## 2024-07-23 RX ADMIN — LISINOPRIL 2.5 MG: 2.5 TABLET ORAL at 09:01

## 2024-07-23 RX ADMIN — PREGABALIN 100 MG: 100 CAPSULE ORAL at 20:30

## 2024-07-23 RX ADMIN — FUROSEMIDE 40 MG: 40 TABLET ORAL at 09:01

## 2024-07-23 RX ADMIN — ATORVASTATIN CALCIUM 10 MG: 10 TABLET, FILM COATED ORAL at 20:30

## 2024-07-23 RX ADMIN — DICLOFENAC SODIUM 4 G: 10 GEL TOPICAL at 07:38

## 2024-07-23 RX ADMIN — INSULIN GLARGINE 22 UNITS: 100 INJECTION, SOLUTION SUBCUTANEOUS at 09:02

## 2024-07-23 RX ADMIN — BACLOFEN 10 MG: 10 TABLET ORAL at 06:13

## 2024-07-23 RX ADMIN — SERTRALINE HYDROCHLORIDE 50 MG: 50 TABLET ORAL at 20:30

## 2024-07-23 RX ADMIN — MONTELUKAST 10 MG: 10 TABLET, FILM COATED ORAL at 20:30

## 2024-07-23 RX ADMIN — INSULIN LISPRO 3 UNITS: 100 INJECTION, SOLUTION INTRAVENOUS; SUBCUTANEOUS at 20:31

## 2024-07-23 RX ADMIN — OXYCODONE AND ACETAMINOPHEN 1 TABLET: 10; 325 TABLET ORAL at 23:55

## 2024-07-23 RX ADMIN — PREGABALIN 100 MG: 100 CAPSULE ORAL at 09:02

## 2024-07-23 RX ADMIN — PREGABALIN 100 MG: 100 CAPSULE ORAL at 15:46

## 2024-07-23 NOTE — PLAN OF CARE
Goal Outcome Evaluation:  Plan of Care Reviewed With: patient        Progress: no change  Outcome Evaluation: pt AAOX4 continues with pain medication for hip and shoulder pain. States it does not totally relieve pain but does help lessen it. blood sugar 192 medicated per SSi and long acting scheduled Lantus. make needs known to staff pleasant demeanor this shift. call light and personal items within reach  at bedside. Continue POC.

## 2024-07-24 LAB
GLUCOSE BLDC GLUCOMTR-MCNC: 111 MG/DL (ref 70–99)
GLUCOSE BLDC GLUCOMTR-MCNC: 148 MG/DL (ref 70–99)
GLUCOSE BLDC GLUCOMTR-MCNC: 161 MG/DL (ref 70–99)
GLUCOSE BLDC GLUCOMTR-MCNC: 92 MG/DL (ref 70–99)

## 2024-07-24 PROCEDURE — 63710000001 INSULIN GLARGINE PER 5 UNITS: Performed by: PHYSICIAN ASSISTANT

## 2024-07-24 PROCEDURE — 82948 REAGENT STRIP/BLOOD GLUCOSE: CPT

## 2024-07-24 PROCEDURE — 63710000001 INSULIN LISPRO (HUMAN) PER 5 UNITS: Performed by: PHYSICIAN ASSISTANT

## 2024-07-24 RX ADMIN — FUROSEMIDE 40 MG: 40 TABLET ORAL at 17:37

## 2024-07-24 RX ADMIN — BACLOFEN 10 MG: 10 TABLET ORAL at 08:02

## 2024-07-24 RX ADMIN — DICLOFENAC SODIUM 4 G: 10 GEL TOPICAL at 21:55

## 2024-07-24 RX ADMIN — CETIRIZINE HYDROCHLORIDE 10 MG: 10 TABLET, FILM COATED ORAL at 08:01

## 2024-07-24 RX ADMIN — INSULIN GLARGINE 22 UNITS: 100 INJECTION, SOLUTION SUBCUTANEOUS at 21:55

## 2024-07-24 RX ADMIN — PREGABALIN 100 MG: 100 CAPSULE ORAL at 08:01

## 2024-07-24 RX ADMIN — INSULIN LISPRO 3 UNITS: 100 INJECTION, SOLUTION INTRAVENOUS; SUBCUTANEOUS at 12:51

## 2024-07-24 RX ADMIN — DICLOFENAC SODIUM 4 G: 10 GEL TOPICAL at 08:01

## 2024-07-24 RX ADMIN — SERTRALINE HYDROCHLORIDE 50 MG: 50 TABLET ORAL at 21:56

## 2024-07-24 RX ADMIN — PREGABALIN 100 MG: 100 CAPSULE ORAL at 15:57

## 2024-07-24 RX ADMIN — ATORVASTATIN CALCIUM 10 MG: 10 TABLET, FILM COATED ORAL at 21:56

## 2024-07-24 RX ADMIN — Medication 1 APPLICATION: at 14:29

## 2024-07-24 RX ADMIN — EMPAGLIFLOZIN 10 MG: 10 TABLET, FILM COATED ORAL at 08:01

## 2024-07-24 RX ADMIN — BACLOFEN 10 MG: 10 TABLET ORAL at 15:57

## 2024-07-24 RX ADMIN — APIXABAN 5 MG: 5 TABLET, FILM COATED ORAL at 21:56

## 2024-07-24 RX ADMIN — APIXABAN 5 MG: 5 TABLET, FILM COATED ORAL at 08:01

## 2024-07-24 RX ADMIN — PREGABALIN 100 MG: 100 CAPSULE ORAL at 21:58

## 2024-07-24 RX ADMIN — FERROUS SULFATE TAB 325 MG (65 MG ELEMENTAL FE) 325 MG: 325 (65 FE) TAB at 08:01

## 2024-07-24 RX ADMIN — FUROSEMIDE 40 MG: 40 TABLET ORAL at 08:01

## 2024-07-24 RX ADMIN — ACETAMINOPHEN 650 MG: 325 TABLET ORAL at 04:18

## 2024-07-24 RX ADMIN — LISINOPRIL 2.5 MG: 2.5 TABLET ORAL at 08:01

## 2024-07-24 RX ADMIN — OXYCODONE AND ACETAMINOPHEN 1 TABLET: 10; 325 TABLET ORAL at 15:57

## 2024-07-24 RX ADMIN — DICLOFENAC SODIUM 4 G: 10 GEL TOPICAL at 02:53

## 2024-07-24 RX ADMIN — CYANOCOBALAMIN TAB 500 MCG 1000 MCG: 500 TAB at 08:02

## 2024-07-24 RX ADMIN — Medication 10 MG: at 21:56

## 2024-07-24 RX ADMIN — INSULIN GLARGINE 22 UNITS: 100 INJECTION, SOLUTION SUBCUTANEOUS at 08:01

## 2024-07-24 RX ADMIN — OXYCODONE AND ACETAMINOPHEN 1 TABLET: 10; 325 TABLET ORAL at 08:01

## 2024-07-24 RX ADMIN — IBUPROFEN 400 MG: 400 TABLET ORAL at 12:51

## 2024-07-24 RX ADMIN — MONTELUKAST 10 MG: 10 TABLET, FILM COATED ORAL at 21:56

## 2024-07-24 NOTE — PLAN OF CARE
Goal Outcome Evaluation:              Outcome Evaluation: Pain managed with PRN medications. No changes this shift.

## 2024-07-24 NOTE — PLAN OF CARE
"Goal Outcome Evaluation:  Plan of Care Reviewed With: patient        Progress: no change  Outcome Evaluation: Patient is alert and oriented. Given PRN Baclofen and Percocet at 1546 for left hip pain. Med effective. Refused bath and Aveeno to BLE today. Scheduled lasix held due to BP below parameters. Patient spoke with nurse about his code status today and stated, \"Right now leave it the way it is. Look, if somethings happens I want you to do what you have to to bring me back. But if I can't get back to where I am with the problems I already have and I am going to be on life support then leave me on the machines until my kids can come, if they even want to see me, then let me go.\" He also stated \"don't bring the doctor in on this right now. Just leave it alone.\" No changes to code status at this time. Patient remains full code. Con't current POC.                               "

## 2024-07-24 NOTE — PLAN OF CARE
Goal Outcome Evaluation:  Plan of Care Reviewed With: patient        Progress: no change  Outcome Evaluation: RSD AAOX4 pain med reduces pain to tolerable level. Ibuprophen given @ 2253, percocet and baclofen @0515, tylenol@ 9885, No BM this shift. pt stated he had a good night. cont. POC.

## 2024-07-25 LAB
GLUCOSE BLDC GLUCOMTR-MCNC: 110 MG/DL (ref 70–99)
GLUCOSE BLDC GLUCOMTR-MCNC: 112 MG/DL (ref 70–99)
GLUCOSE BLDC GLUCOMTR-MCNC: 142 MG/DL (ref 70–99)
GLUCOSE BLDC GLUCOMTR-MCNC: 173 MG/DL (ref 70–99)

## 2024-07-25 PROCEDURE — 82948 REAGENT STRIP/BLOOD GLUCOSE: CPT

## 2024-07-25 PROCEDURE — 63710000001 INSULIN LISPRO (HUMAN) PER 5 UNITS: Performed by: PHYSICIAN ASSISTANT

## 2024-07-25 PROCEDURE — 63710000001 INSULIN GLARGINE PER 5 UNITS: Performed by: PHYSICIAN ASSISTANT

## 2024-07-25 RX ADMIN — Medication 10 MG: at 20:33

## 2024-07-25 RX ADMIN — Medication 1 APPLICATION: at 15:45

## 2024-07-25 RX ADMIN — PREGABALIN 100 MG: 100 CAPSULE ORAL at 20:33

## 2024-07-25 RX ADMIN — APIXABAN 5 MG: 5 TABLET, FILM COATED ORAL at 08:19

## 2024-07-25 RX ADMIN — INSULIN LISPRO 3 UNITS: 100 INJECTION, SOLUTION INTRAVENOUS; SUBCUTANEOUS at 20:34

## 2024-07-25 RX ADMIN — BACLOFEN 10 MG: 10 TABLET ORAL at 08:18

## 2024-07-25 RX ADMIN — OXYCODONE AND ACETAMINOPHEN 1 TABLET: 10; 325 TABLET ORAL at 00:07

## 2024-07-25 RX ADMIN — PREGABALIN 100 MG: 100 CAPSULE ORAL at 16:22

## 2024-07-25 RX ADMIN — CYANOCOBALAMIN TAB 500 MCG 1000 MCG: 500 TAB at 08:19

## 2024-07-25 RX ADMIN — LISINOPRIL 2.5 MG: 2.5 TABLET ORAL at 08:19

## 2024-07-25 RX ADMIN — FUROSEMIDE 40 MG: 40 TABLET ORAL at 17:22

## 2024-07-25 RX ADMIN — IBUPROFEN 400 MG: 400 TABLET ORAL at 22:56

## 2024-07-25 RX ADMIN — BACLOFEN 10 MG: 10 TABLET ORAL at 16:22

## 2024-07-25 RX ADMIN — DICLOFENAC SODIUM 4 G: 10 GEL TOPICAL at 20:33

## 2024-07-25 RX ADMIN — DICLOFENAC SODIUM 4 G: 10 GEL TOPICAL at 03:05

## 2024-07-25 RX ADMIN — CETIRIZINE HYDROCHLORIDE 10 MG: 10 TABLET, FILM COATED ORAL at 08:19

## 2024-07-25 RX ADMIN — PREGABALIN 100 MG: 100 CAPSULE ORAL at 08:19

## 2024-07-25 RX ADMIN — DICLOFENAC SODIUM 4 G: 10 GEL TOPICAL at 16:23

## 2024-07-25 RX ADMIN — OXYCODONE AND ACETAMINOPHEN 1 TABLET: 10; 325 TABLET ORAL at 16:22

## 2024-07-25 RX ADMIN — INSULIN GLARGINE 22 UNITS: 100 INJECTION, SOLUTION SUBCUTANEOUS at 20:35

## 2024-07-25 RX ADMIN — DICLOFENAC SODIUM 4 G: 10 GEL TOPICAL at 08:19

## 2024-07-25 RX ADMIN — BACLOFEN 10 MG: 10 TABLET ORAL at 00:07

## 2024-07-25 RX ADMIN — EMPAGLIFLOZIN 10 MG: 10 TABLET, FILM COATED ORAL at 08:18

## 2024-07-25 RX ADMIN — IBUPROFEN 400 MG: 400 TABLET ORAL at 07:08

## 2024-07-25 RX ADMIN — MONTELUKAST 10 MG: 10 TABLET, FILM COATED ORAL at 20:34

## 2024-07-25 RX ADMIN — ATORVASTATIN CALCIUM 10 MG: 10 TABLET, FILM COATED ORAL at 20:33

## 2024-07-25 RX ADMIN — OXYCODONE AND ACETAMINOPHEN 1 TABLET: 10; 325 TABLET ORAL at 08:19

## 2024-07-25 RX ADMIN — SERTRALINE HYDROCHLORIDE 50 MG: 50 TABLET ORAL at 20:34

## 2024-07-25 RX ADMIN — APIXABAN 5 MG: 5 TABLET, FILM COATED ORAL at 20:33

## 2024-07-25 RX ADMIN — INSULIN GLARGINE 22 UNITS: 100 INJECTION, SOLUTION SUBCUTANEOUS at 08:19

## 2024-07-25 RX ADMIN — FERROUS SULFATE TAB 325 MG (65 MG ELEMENTAL FE) 325 MG: 325 (65 FE) TAB at 08:19

## 2024-07-25 NOTE — PLAN OF CARE
Goal Outcome Evaluation:  Plan of Care Reviewed With: patient        Progress: no change  Outcome Evaluation: RSD AOX4 no changes , cont pain meds as ordered, pain is always 7-10 regardless if med has been taken or not. call light and personal items within reach at bedside.

## 2024-07-25 NOTE — PLAN OF CARE
Goal Outcome Evaluation:  Plan of Care Reviewed With: patient        Progress: no change  Outcome Evaluation: Resident alert, oriented, able to make needs known to staff. Remains bedbound. Refused exercises at bedside. BP low this am and held MD Chris notifed and changes made to parameters on Lasix. Resident was able to get evening dose. Bloodsugars stable for all 3 shifts. Resident door dashed for dinner, pizza, declined hospital tray for dinner. Incontinent of bowel x1 this shift. Resident pleasant and cooperative.

## 2024-07-26 LAB
GLUCOSE BLDC GLUCOMTR-MCNC: 104 MG/DL (ref 70–99)
GLUCOSE BLDC GLUCOMTR-MCNC: 121 MG/DL (ref 70–99)
GLUCOSE BLDC GLUCOMTR-MCNC: 121 MG/DL (ref 70–99)
GLUCOSE BLDC GLUCOMTR-MCNC: 126 MG/DL (ref 70–99)

## 2024-07-26 PROCEDURE — 63710000001 INSULIN GLARGINE PER 5 UNITS: Performed by: PHYSICIAN ASSISTANT

## 2024-07-26 PROCEDURE — 82948 REAGENT STRIP/BLOOD GLUCOSE: CPT

## 2024-07-26 PROCEDURE — 63710000001 ONDANSETRON ODT 4 MG TABLET DISPERSIBLE: Performed by: PHYSICIAN ASSISTANT

## 2024-07-26 RX ADMIN — INSULIN GLARGINE 22 UNITS: 100 INJECTION, SOLUTION SUBCUTANEOUS at 08:27

## 2024-07-26 RX ADMIN — INSULIN GLARGINE 22 UNITS: 100 INJECTION, SOLUTION SUBCUTANEOUS at 21:04

## 2024-07-26 RX ADMIN — APIXABAN 5 MG: 5 TABLET, FILM COATED ORAL at 08:26

## 2024-07-26 RX ADMIN — DICLOFENAC SODIUM 4 G: 10 GEL TOPICAL at 08:26

## 2024-07-26 RX ADMIN — DICLOFENAC SODIUM 4 G: 10 GEL TOPICAL at 20:59

## 2024-07-26 RX ADMIN — FUROSEMIDE 40 MG: 40 TABLET ORAL at 17:35

## 2024-07-26 RX ADMIN — ONDANSETRON 4 MG: 4 TABLET, ORALLY DISINTEGRATING ORAL at 12:50

## 2024-07-26 RX ADMIN — IBUPROFEN 400 MG: 400 TABLET ORAL at 21:10

## 2024-07-26 RX ADMIN — SERTRALINE HYDROCHLORIDE 50 MG: 50 TABLET ORAL at 21:00

## 2024-07-26 RX ADMIN — FERROUS SULFATE TAB 325 MG (65 MG ELEMENTAL FE) 325 MG: 325 (65 FE) TAB at 08:25

## 2024-07-26 RX ADMIN — LISINOPRIL 2.5 MG: 2.5 TABLET ORAL at 08:25

## 2024-07-26 RX ADMIN — PREGABALIN 100 MG: 100 CAPSULE ORAL at 21:01

## 2024-07-26 RX ADMIN — OXYCODONE AND ACETAMINOPHEN 1 TABLET: 10; 325 TABLET ORAL at 08:25

## 2024-07-26 RX ADMIN — FUROSEMIDE 40 MG: 40 TABLET ORAL at 08:25

## 2024-07-26 RX ADMIN — BACLOFEN 10 MG: 10 TABLET ORAL at 16:42

## 2024-07-26 RX ADMIN — EMPAGLIFLOZIN 10 MG: 10 TABLET, FILM COATED ORAL at 08:25

## 2024-07-26 RX ADMIN — OXYCODONE AND ACETAMINOPHEN 1 TABLET: 10; 325 TABLET ORAL at 16:42

## 2024-07-26 RX ADMIN — BACLOFEN 10 MG: 10 TABLET ORAL at 00:23

## 2024-07-26 RX ADMIN — CYANOCOBALAMIN TAB 500 MCG 1000 MCG: 500 TAB at 08:25

## 2024-07-26 RX ADMIN — PREGABALIN 100 MG: 100 CAPSULE ORAL at 08:25

## 2024-07-26 RX ADMIN — BACLOFEN 10 MG: 10 TABLET ORAL at 08:25

## 2024-07-26 RX ADMIN — APIXABAN 5 MG: 5 TABLET, FILM COATED ORAL at 21:00

## 2024-07-26 RX ADMIN — MONTELUKAST 10 MG: 10 TABLET, FILM COATED ORAL at 21:00

## 2024-07-26 RX ADMIN — PREGABALIN 100 MG: 100 CAPSULE ORAL at 16:42

## 2024-07-26 RX ADMIN — DICLOFENAC SODIUM 4 G: 10 GEL TOPICAL at 17:35

## 2024-07-26 RX ADMIN — OXYCODONE AND ACETAMINOPHEN 1 TABLET: 10; 325 TABLET ORAL at 00:23

## 2024-07-26 RX ADMIN — Medication 10 MG: at 21:00

## 2024-07-26 RX ADMIN — CETIRIZINE HYDROCHLORIDE 10 MG: 10 TABLET, FILM COATED ORAL at 08:25

## 2024-07-26 RX ADMIN — ATORVASTATIN CALCIUM 10 MG: 10 TABLET, FILM COATED ORAL at 21:00

## 2024-07-26 NOTE — PLAN OF CARE
Goal Outcome Evaluation:  Plan of Care Reviewed With: patient        Progress: no change  Outcome Evaluation: Resident alert, oriented, able to make needs known to staff. Remains bedbound, refuses to turn and off load buttock. Complains of severe pain to rt hip and lt shoulder. Medicated for prn pain with Percocet and Baclofen x2 this shift, medicated with prn Zofran for reports of nausea, effective. PA notified of c/o of dizziness. Offered resident option to take Percocet and baclofen seperate times to decrease dizziness, resident refused, still wants to take both at the same time. Blood glucose stable for all 3 meals. 4 episodes of bowel incontinence this shift. Refused exercises at bedside. Will continue current plan of care.

## 2024-07-26 NOTE — PLAN OF CARE
Goal Outcome Evaluation:  Plan of Care Reviewed With: patient        Progress: improving  Outcome Evaluation: No significant changes this shift, bedbound. Uses urinal and bedpan. Blood sugar was 173 and he got coverage. Medicated pt  for pain x 2. Will continue with POC. Call light and personal items within reach.

## 2024-07-27 LAB
GLUCOSE BLDC GLUCOMTR-MCNC: 126 MG/DL (ref 70–99)
GLUCOSE BLDC GLUCOMTR-MCNC: 127 MG/DL (ref 70–99)
GLUCOSE BLDC GLUCOMTR-MCNC: 89 MG/DL (ref 70–99)
GLUCOSE BLDC GLUCOMTR-MCNC: 90 MG/DL (ref 70–99)

## 2024-07-27 PROCEDURE — 63710000001 INSULIN GLARGINE PER 5 UNITS: Performed by: PHYSICIAN ASSISTANT

## 2024-07-27 PROCEDURE — 82948 REAGENT STRIP/BLOOD GLUCOSE: CPT

## 2024-07-27 RX ADMIN — BACLOFEN 10 MG: 10 TABLET ORAL at 17:54

## 2024-07-27 RX ADMIN — Medication 10 MG: at 20:40

## 2024-07-27 RX ADMIN — INSULIN GLARGINE 22 UNITS: 100 INJECTION, SOLUTION SUBCUTANEOUS at 20:40

## 2024-07-27 RX ADMIN — IBUPROFEN 400 MG: 400 TABLET ORAL at 08:10

## 2024-07-27 RX ADMIN — CETIRIZINE HYDROCHLORIDE 10 MG: 10 TABLET, FILM COATED ORAL at 08:10

## 2024-07-27 RX ADMIN — EMPAGLIFLOZIN 10 MG: 10 TABLET, FILM COATED ORAL at 08:10

## 2024-07-27 RX ADMIN — APIXABAN 5 MG: 5 TABLET, FILM COATED ORAL at 20:39

## 2024-07-27 RX ADMIN — PREGABALIN 100 MG: 100 CAPSULE ORAL at 08:10

## 2024-07-27 RX ADMIN — BACLOFEN 10 MG: 10 TABLET ORAL at 09:51

## 2024-07-27 RX ADMIN — SERTRALINE HYDROCHLORIDE 50 MG: 50 TABLET ORAL at 20:39

## 2024-07-27 RX ADMIN — DICLOFENAC SODIUM 4 G: 10 GEL TOPICAL at 08:13

## 2024-07-27 RX ADMIN — DICLOFENAC SODIUM 4 G: 10 GEL TOPICAL at 14:46

## 2024-07-27 RX ADMIN — ATORVASTATIN CALCIUM 10 MG: 10 TABLET, FILM COATED ORAL at 20:39

## 2024-07-27 RX ADMIN — FUROSEMIDE 40 MG: 40 TABLET ORAL at 08:10

## 2024-07-27 RX ADMIN — CYANOCOBALAMIN TAB 500 MCG 1000 MCG: 500 TAB at 08:10

## 2024-07-27 RX ADMIN — OXYCODONE AND ACETAMINOPHEN 1 TABLET: 10; 325 TABLET ORAL at 01:55

## 2024-07-27 RX ADMIN — OXYCODONE AND ACETAMINOPHEN 1 TABLET: 10; 325 TABLET ORAL at 17:54

## 2024-07-27 RX ADMIN — DICLOFENAC SODIUM 4 G: 10 GEL TOPICAL at 02:58

## 2024-07-27 RX ADMIN — INSULIN GLARGINE 22 UNITS: 100 INJECTION, SOLUTION SUBCUTANEOUS at 08:09

## 2024-07-27 RX ADMIN — APIXABAN 5 MG: 5 TABLET, FILM COATED ORAL at 08:10

## 2024-07-27 RX ADMIN — Medication 1 APPLICATION: at 14:46

## 2024-07-27 RX ADMIN — OXYCODONE AND ACETAMINOPHEN 1 TABLET: 10; 325 TABLET ORAL at 09:51

## 2024-07-27 RX ADMIN — IBUPROFEN 400 MG: 400 TABLET ORAL at 20:39

## 2024-07-27 RX ADMIN — FUROSEMIDE 40 MG: 40 TABLET ORAL at 17:54

## 2024-07-27 RX ADMIN — MONTELUKAST 10 MG: 10 TABLET, FILM COATED ORAL at 20:40

## 2024-07-27 RX ADMIN — BACLOFEN 10 MG: 10 TABLET ORAL at 01:55

## 2024-07-27 RX ADMIN — LISINOPRIL 2.5 MG: 2.5 TABLET ORAL at 08:10

## 2024-07-27 RX ADMIN — PREGABALIN 100 MG: 100 CAPSULE ORAL at 20:39

## 2024-07-27 RX ADMIN — PREGABALIN 100 MG: 100 CAPSULE ORAL at 17:13

## 2024-07-27 RX ADMIN — DICLOFENAC SODIUM 4 G: 10 GEL TOPICAL at 20:42

## 2024-07-27 RX ADMIN — FERROUS SULFATE TAB 325 MG (65 MG ELEMENTAL FE) 325 MG: 325 (65 FE) TAB at 08:10

## 2024-07-27 NOTE — PLAN OF CARE
Goal Outcome Evaluation:  Plan of Care Reviewed With: patient        Progress: no change  Outcome Evaluation: Patient alert and oriented x 4. Maintained severe pain, sleeping in between care, 9/10 pain per patient. Refuses turns unless he is the one prompting the request, about every four to six hours. Complains of pain on left hip, requests to be turned onto left hip, recommended against, education needs reinforcement. Blood glucose within target range, this shift, administered long acting insulin only, see MAR. Medicated for pain per MAR percocet and baclofen, no reports of nausea throughout shift. Initially refused voltaren gel, stating it doesn't work, then later requested. RN administered topically to to left shoulder. Eyes open, breathing deeply, no needs voiced, call bell in reach.

## 2024-07-27 NOTE — PLAN OF CARE
Goal Outcome Evaluation:  Plan of Care Reviewed With: patient        Progress: no change  Outcome Evaluation: Pt A&O x 4, VSS, c/o moderate to severe pain, administered prn pain medication. Pt refuses turns.

## 2024-07-28 LAB
GLUCOSE BLDC GLUCOMTR-MCNC: 115 MG/DL (ref 70–99)
GLUCOSE BLDC GLUCOMTR-MCNC: 116 MG/DL (ref 70–99)
GLUCOSE BLDC GLUCOMTR-MCNC: 118 MG/DL (ref 70–99)
GLUCOSE BLDC GLUCOMTR-MCNC: 97 MG/DL (ref 70–99)

## 2024-07-28 PROCEDURE — 82948 REAGENT STRIP/BLOOD GLUCOSE: CPT

## 2024-07-28 PROCEDURE — 63710000001 INSULIN GLARGINE PER 5 UNITS: Performed by: PHYSICIAN ASSISTANT

## 2024-07-28 RX ADMIN — OXYCODONE AND ACETAMINOPHEN 1 TABLET: 10; 325 TABLET ORAL at 10:27

## 2024-07-28 RX ADMIN — DICLOFENAC SODIUM 4 G: 10 GEL TOPICAL at 02:04

## 2024-07-28 RX ADMIN — CETIRIZINE HYDROCHLORIDE 10 MG: 10 TABLET, FILM COATED ORAL at 08:38

## 2024-07-28 RX ADMIN — ATORVASTATIN CALCIUM 10 MG: 10 TABLET, FILM COATED ORAL at 20:47

## 2024-07-28 RX ADMIN — BACLOFEN 10 MG: 10 TABLET ORAL at 02:04

## 2024-07-28 RX ADMIN — EMPAGLIFLOZIN 10 MG: 10 TABLET, FILM COATED ORAL at 08:38

## 2024-07-28 RX ADMIN — BACLOFEN 10 MG: 10 TABLET ORAL at 18:43

## 2024-07-28 RX ADMIN — OXYCODONE AND ACETAMINOPHEN 1 TABLET: 10; 325 TABLET ORAL at 18:43

## 2024-07-28 RX ADMIN — PREGABALIN 100 MG: 100 CAPSULE ORAL at 15:53

## 2024-07-28 RX ADMIN — LISINOPRIL 2.5 MG: 2.5 TABLET ORAL at 08:38

## 2024-07-28 RX ADMIN — DICLOFENAC SODIUM 4 G: 10 GEL TOPICAL at 15:53

## 2024-07-28 RX ADMIN — DICLOFENAC SODIUM 4 G: 10 GEL TOPICAL at 20:00

## 2024-07-28 RX ADMIN — APIXABAN 5 MG: 5 TABLET, FILM COATED ORAL at 20:47

## 2024-07-28 RX ADMIN — CYANOCOBALAMIN TAB 500 MCG 1000 MCG: 500 TAB at 08:42

## 2024-07-28 RX ADMIN — BACLOFEN 10 MG: 10 TABLET ORAL at 10:27

## 2024-07-28 RX ADMIN — INSULIN GLARGINE 22 UNITS: 100 INJECTION, SOLUTION SUBCUTANEOUS at 20:47

## 2024-07-28 RX ADMIN — FUROSEMIDE 40 MG: 40 TABLET ORAL at 08:38

## 2024-07-28 RX ADMIN — OXYCODONE AND ACETAMINOPHEN 1 TABLET: 10; 325 TABLET ORAL at 02:04

## 2024-07-28 RX ADMIN — PREGABALIN 100 MG: 100 CAPSULE ORAL at 08:42

## 2024-07-28 RX ADMIN — IBUPROFEN 400 MG: 400 TABLET ORAL at 23:26

## 2024-07-28 RX ADMIN — Medication 1 APPLICATION: at 15:53

## 2024-07-28 RX ADMIN — PREGABALIN 100 MG: 100 CAPSULE ORAL at 20:47

## 2024-07-28 RX ADMIN — Medication 10 MG: at 20:47

## 2024-07-28 RX ADMIN — SERTRALINE HYDROCHLORIDE 50 MG: 50 TABLET ORAL at 20:47

## 2024-07-28 RX ADMIN — IBUPROFEN 400 MG: 400 TABLET ORAL at 08:38

## 2024-07-28 RX ADMIN — MONTELUKAST 10 MG: 10 TABLET, FILM COATED ORAL at 20:47

## 2024-07-28 RX ADMIN — DICLOFENAC SODIUM 4 G: 10 GEL TOPICAL at 08:39

## 2024-07-28 RX ADMIN — APIXABAN 5 MG: 5 TABLET, FILM COATED ORAL at 08:38

## 2024-07-28 RX ADMIN — INSULIN GLARGINE 22 UNITS: 100 INJECTION, SOLUTION SUBCUTANEOUS at 08:38

## 2024-07-28 RX ADMIN — FUROSEMIDE 40 MG: 40 TABLET ORAL at 18:43

## 2024-07-28 RX ADMIN — FERROUS SULFATE TAB 325 MG (65 MG ELEMENTAL FE) 325 MG: 325 (65 FE) TAB at 08:38

## 2024-07-29 LAB
GLUCOSE BLDC GLUCOMTR-MCNC: 102 MG/DL (ref 70–99)
GLUCOSE BLDC GLUCOMTR-MCNC: 110 MG/DL (ref 70–99)
GLUCOSE BLDC GLUCOMTR-MCNC: 124 MG/DL (ref 70–99)
GLUCOSE BLDC GLUCOMTR-MCNC: 232 MG/DL (ref 70–99)

## 2024-07-29 PROCEDURE — 63710000001 ONDANSETRON ODT 4 MG TABLET DISPERSIBLE: Performed by: PHYSICIAN ASSISTANT

## 2024-07-29 PROCEDURE — 82948 REAGENT STRIP/BLOOD GLUCOSE: CPT

## 2024-07-29 PROCEDURE — 63710000001 INSULIN LISPRO (HUMAN) PER 5 UNITS: Performed by: PHYSICIAN ASSISTANT

## 2024-07-29 PROCEDURE — 63710000001 INSULIN GLARGINE PER 5 UNITS: Performed by: PHYSICIAN ASSISTANT

## 2024-07-29 RX ADMIN — POLYETHYLENE GLYCOL 400 AND PROPYLENE GLYCOL 1 DROP: 4; 3 SOLUTION/ DROPS OPHTHALMIC at 21:03

## 2024-07-29 RX ADMIN — APIXABAN 5 MG: 5 TABLET, FILM COATED ORAL at 20:45

## 2024-07-29 RX ADMIN — MONTELUKAST 10 MG: 10 TABLET, FILM COATED ORAL at 20:44

## 2024-07-29 RX ADMIN — APIXABAN 5 MG: 5 TABLET, FILM COATED ORAL at 08:22

## 2024-07-29 RX ADMIN — OXYCODONE AND ACETAMINOPHEN 1 TABLET: 10; 325 TABLET ORAL at 02:58

## 2024-07-29 RX ADMIN — BISMUTH SUBSALICYLATE 524 MG: 262 TABLET, CHEWABLE ORAL at 21:02

## 2024-07-29 RX ADMIN — CYANOCOBALAMIN TAB 500 MCG 1000 MCG: 500 TAB at 08:22

## 2024-07-29 RX ADMIN — EMPAGLIFLOZIN 10 MG: 10 TABLET, FILM COATED ORAL at 08:22

## 2024-07-29 RX ADMIN — ONDANSETRON 4 MG: 4 TABLET, ORALLY DISINTEGRATING ORAL at 12:34

## 2024-07-29 RX ADMIN — INSULIN GLARGINE 22 UNITS: 100 INJECTION, SOLUTION SUBCUTANEOUS at 20:48

## 2024-07-29 RX ADMIN — FUROSEMIDE 40 MG: 40 TABLET ORAL at 08:22

## 2024-07-29 RX ADMIN — FERROUS SULFATE TAB 325 MG (65 MG ELEMENTAL FE) 325 MG: 325 (65 FE) TAB at 08:22

## 2024-07-29 RX ADMIN — PREGABALIN 100 MG: 100 CAPSULE ORAL at 16:28

## 2024-07-29 RX ADMIN — OXYCODONE AND ACETAMINOPHEN 1 TABLET: 10; 325 TABLET ORAL at 11:20

## 2024-07-29 RX ADMIN — BACLOFEN 10 MG: 10 TABLET ORAL at 11:20

## 2024-07-29 RX ADMIN — OXYCODONE AND ACETAMINOPHEN 1 TABLET: 10; 325 TABLET ORAL at 20:45

## 2024-07-29 RX ADMIN — LISINOPRIL 2.5 MG: 2.5 TABLET ORAL at 08:22

## 2024-07-29 RX ADMIN — INSULIN LISPRO 5 UNITS: 100 INJECTION, SOLUTION INTRAVENOUS; SUBCUTANEOUS at 20:47

## 2024-07-29 RX ADMIN — PREGABALIN 100 MG: 100 CAPSULE ORAL at 08:22

## 2024-07-29 RX ADMIN — DICLOFENAC SODIUM 4 G: 10 GEL TOPICAL at 20:36

## 2024-07-29 RX ADMIN — DICLOFENAC SODIUM 4 G: 10 GEL TOPICAL at 08:22

## 2024-07-29 RX ADMIN — CETIRIZINE HYDROCHLORIDE 10 MG: 10 TABLET, FILM COATED ORAL at 08:22

## 2024-07-29 RX ADMIN — DICLOFENAC SODIUM 4 G: 10 GEL TOPICAL at 16:28

## 2024-07-29 RX ADMIN — BACLOFEN 10 MG: 10 TABLET ORAL at 20:46

## 2024-07-29 RX ADMIN — Medication 10 MG: at 20:45

## 2024-07-29 RX ADMIN — PREGABALIN 100 MG: 100 CAPSULE ORAL at 20:45

## 2024-07-29 RX ADMIN — INSULIN GLARGINE 22 UNITS: 100 INJECTION, SOLUTION SUBCUTANEOUS at 08:21

## 2024-07-29 RX ADMIN — DICLOFENAC SODIUM 4 G: 10 GEL TOPICAL at 02:58

## 2024-07-29 RX ADMIN — SERTRALINE HYDROCHLORIDE 50 MG: 50 TABLET ORAL at 20:44

## 2024-07-29 RX ADMIN — ATORVASTATIN CALCIUM 10 MG: 10 TABLET, FILM COATED ORAL at 20:45

## 2024-07-29 RX ADMIN — BACLOFEN 10 MG: 10 TABLET ORAL at 02:58

## 2024-07-29 NOTE — PLAN OF CARE
Goal Outcome Evaluation:              Outcome Evaluation: Pt A&OX4, pain controlled with PRN pain medicines, pt refuses turns and repostion, encourage turn and reposition to help prevent skin breakdown, skin barrier applied, urinal at bedside, VSS. Pt resting in bed, is able to make needs known, call light in reach, will continue current plan of care

## 2024-07-29 NOTE — PLAN OF CARE
Goal Outcome Evaluation:  Plan of Care Reviewed With: patient        Progress: improving  Outcome Evaluation: Resident alert, oriented, able to make needs known to staff. remains bed bound. refuses to turn and off load weight to buttock. Medicated for prn pain with percocet and Baclofen x1, effective. Medicated for prn nausea x1, effective. Bloodglucose stable for all 3 meals. Resident doordashed snack foods this afternoon. continues to be incontinent of bowel. Continue current plan of care.

## 2024-07-30 LAB
ALBUMIN SERPL-MCNC: 2.7 G/DL (ref 3.5–5.2)
ALBUMIN/GLOB SERPL: 1.1 G/DL
ALP SERPL-CCNC: 187 U/L (ref 39–117)
ALT SERPL W P-5'-P-CCNC: 23 U/L (ref 1–41)
ANION GAP SERPL CALCULATED.3IONS-SCNC: 5.3 MMOL/L (ref 5–15)
AST SERPL-CCNC: 36 U/L (ref 1–40)
BILIRUB SERPL-MCNC: 0.5 MG/DL (ref 0–1.2)
BUN SERPL-MCNC: 15 MG/DL (ref 8–23)
BUN/CREAT SERPL: 21.7 (ref 7–25)
CALCIUM SPEC-SCNC: 8 MG/DL (ref 8.6–10.5)
CHLORIDE SERPL-SCNC: 106 MMOL/L (ref 98–107)
CO2 SERPL-SCNC: 26.7 MMOL/L (ref 22–29)
CREAT SERPL-MCNC: 0.69 MG/DL (ref 0.76–1.27)
DEPRECATED RDW RBC AUTO: 48.1 FL (ref 37–54)
EGFRCR SERPLBLD CKD-EPI 2021: 102.7 ML/MIN/1.73
ERYTHROCYTE [DISTWIDTH] IN BLOOD BY AUTOMATED COUNT: 15 % (ref 12.3–15.4)
GLOBULIN UR ELPH-MCNC: 2.5 GM/DL
GLUCOSE BLDC GLUCOMTR-MCNC: 111 MG/DL (ref 70–99)
GLUCOSE BLDC GLUCOMTR-MCNC: 171 MG/DL (ref 70–99)
GLUCOSE BLDC GLUCOMTR-MCNC: 82 MG/DL (ref 70–99)
GLUCOSE BLDC GLUCOMTR-MCNC: 93 MG/DL (ref 70–99)
GLUCOSE SERPL-MCNC: 86 MG/DL (ref 65–99)
HCT VFR BLD AUTO: 34.4 % (ref 37.5–51)
HGB BLD-MCNC: 10.9 G/DL (ref 13–17.7)
MCH RBC QN AUTO: 27.8 PG (ref 26.6–33)
MCHC RBC AUTO-ENTMCNC: 31.7 G/DL (ref 31.5–35.7)
MCV RBC AUTO: 87.8 FL (ref 79–97)
PLATELET # BLD AUTO: 86 10*3/MM3 (ref 140–450)
PMV BLD AUTO: 12.6 FL (ref 6–12)
POTASSIUM SERPL-SCNC: 4 MMOL/L (ref 3.5–5.2)
PROT SERPL-MCNC: 5.2 G/DL (ref 6–8.5)
RBC # BLD AUTO: 3.92 10*6/MM3 (ref 4.14–5.8)
SODIUM SERPL-SCNC: 138 MMOL/L (ref 136–145)
WBC NRBC COR # BLD AUTO: 3.77 10*3/MM3 (ref 3.4–10.8)

## 2024-07-30 PROCEDURE — 85027 COMPLETE CBC AUTOMATED: CPT | Performed by: PHYSICIAN ASSISTANT

## 2024-07-30 PROCEDURE — 80053 COMPREHEN METABOLIC PANEL: CPT | Performed by: PHYSICIAN ASSISTANT

## 2024-07-30 PROCEDURE — 63710000001 INSULIN GLARGINE PER 5 UNITS: Performed by: PHYSICIAN ASSISTANT

## 2024-07-30 PROCEDURE — 82948 REAGENT STRIP/BLOOD GLUCOSE: CPT

## 2024-07-30 PROCEDURE — 63710000001 INSULIN LISPRO (HUMAN) PER 5 UNITS: Performed by: PHYSICIAN ASSISTANT

## 2024-07-30 RX ADMIN — PREGABALIN 100 MG: 100 CAPSULE ORAL at 08:13

## 2024-07-30 RX ADMIN — CYANOCOBALAMIN TAB 500 MCG 1000 MCG: 500 TAB at 08:13

## 2024-07-30 RX ADMIN — CETIRIZINE HYDROCHLORIDE 10 MG: 10 TABLET, FILM COATED ORAL at 08:13

## 2024-07-30 RX ADMIN — BACLOFEN 10 MG: 10 TABLET ORAL at 23:02

## 2024-07-30 RX ADMIN — OXYCODONE AND ACETAMINOPHEN 1 TABLET: 10; 325 TABLET ORAL at 05:34

## 2024-07-30 RX ADMIN — OXYCODONE AND ACETAMINOPHEN 1 TABLET: 10; 325 TABLET ORAL at 13:51

## 2024-07-30 RX ADMIN — DICLOFENAC SODIUM 4 G: 10 GEL TOPICAL at 16:27

## 2024-07-30 RX ADMIN — BACLOFEN 10 MG: 10 TABLET ORAL at 05:34

## 2024-07-30 RX ADMIN — PREGABALIN 100 MG: 100 CAPSULE ORAL at 16:27

## 2024-07-30 RX ADMIN — BISMUTH SUBSALICYLATE 524 MG: 262 TABLET, CHEWABLE ORAL at 21:30

## 2024-07-30 RX ADMIN — APIXABAN 5 MG: 5 TABLET, FILM COATED ORAL at 08:13

## 2024-07-30 RX ADMIN — FERROUS SULFATE TAB 325 MG (65 MG ELEMENTAL FE) 325 MG: 325 (65 FE) TAB at 08:13

## 2024-07-30 RX ADMIN — FUROSEMIDE 40 MG: 40 TABLET ORAL at 17:47

## 2024-07-30 RX ADMIN — APIXABAN 5 MG: 5 TABLET, FILM COATED ORAL at 21:24

## 2024-07-30 RX ADMIN — BACLOFEN 10 MG: 10 TABLET ORAL at 13:51

## 2024-07-30 RX ADMIN — OXYCODONE AND ACETAMINOPHEN 1 TABLET: 10; 325 TABLET ORAL at 23:02

## 2024-07-30 RX ADMIN — DICLOFENAC SODIUM 4 G: 10 GEL TOPICAL at 01:10

## 2024-07-30 RX ADMIN — FUROSEMIDE 40 MG: 40 TABLET ORAL at 08:13

## 2024-07-30 RX ADMIN — INSULIN LISPRO 3 UNITS: 100 INJECTION, SOLUTION INTRAVENOUS; SUBCUTANEOUS at 21:23

## 2024-07-30 RX ADMIN — MONTELUKAST 10 MG: 10 TABLET, FILM COATED ORAL at 21:24

## 2024-07-30 RX ADMIN — SERTRALINE HYDROCHLORIDE 50 MG: 50 TABLET ORAL at 21:24

## 2024-07-30 RX ADMIN — DICLOFENAC SODIUM 4 G: 10 GEL TOPICAL at 21:23

## 2024-07-30 RX ADMIN — LISINOPRIL 2.5 MG: 2.5 TABLET ORAL at 08:13

## 2024-07-30 RX ADMIN — PREGABALIN 100 MG: 100 CAPSULE ORAL at 21:26

## 2024-07-30 RX ADMIN — IBUPROFEN 400 MG: 400 TABLET ORAL at 03:06

## 2024-07-30 RX ADMIN — INSULIN GLARGINE 22 UNITS: 100 INJECTION, SOLUTION SUBCUTANEOUS at 08:13

## 2024-07-30 RX ADMIN — ATORVASTATIN CALCIUM 10 MG: 10 TABLET, FILM COATED ORAL at 21:24

## 2024-07-30 RX ADMIN — DICLOFENAC SODIUM 4 G: 10 GEL TOPICAL at 08:13

## 2024-07-30 RX ADMIN — EMPAGLIFLOZIN 10 MG: 10 TABLET, FILM COATED ORAL at 08:13

## 2024-07-30 RX ADMIN — Medication 10 MG: at 21:24

## 2024-07-30 RX ADMIN — BISMUTH SUBSALICYLATE 524 MG: 262 TABLET, CHEWABLE ORAL at 05:31

## 2024-07-30 RX ADMIN — INSULIN GLARGINE 22 UNITS: 100 INJECTION, SOLUTION SUBCUTANEOUS at 21:23

## 2024-07-30 NOTE — PLAN OF CARE
Goal Outcome Evaluation:  Plan of Care Reviewed With: patient        Progress: no change  Outcome Evaluation: Resident alert, oriented, able to make needs known to staff. Remains bedbound. will get up tomorrow for weekly weight,per resident. Blood glucose stable for all meals. Doordahed groceries. Explained how he made all healthy choices. Doordashed fruit, sugar free gum and pickles. Resident marcela incont. of bowel. Cont current plan of care.

## 2024-07-30 NOTE — PLAN OF CARE
Goal Outcome Evaluation:  Plan of Care Reviewed With: patient        Progress: improving  Outcome Evaluation: No significant changes this shift. Used the urinal and bedpan several times. Was medicated for pain x2 in left hip and for diarrhea x 2. Will continue with is plan of care. Call light and personal items within reach.

## 2024-07-31 LAB
GLUCOSE BLDC GLUCOMTR-MCNC: 111 MG/DL (ref 70–99)
GLUCOSE BLDC GLUCOMTR-MCNC: 127 MG/DL (ref 70–99)
GLUCOSE BLDC GLUCOMTR-MCNC: 144 MG/DL (ref 70–99)
GLUCOSE BLDC GLUCOMTR-MCNC: 99 MG/DL (ref 70–99)

## 2024-07-31 PROCEDURE — 63710000001 ONDANSETRON ODT 4 MG TABLET DISPERSIBLE: Performed by: PHYSICIAN ASSISTANT

## 2024-07-31 PROCEDURE — 99309 SBSQ NF CARE MODERATE MDM 30: CPT | Performed by: PHYSICIAN ASSISTANT

## 2024-07-31 PROCEDURE — 82948 REAGENT STRIP/BLOOD GLUCOSE: CPT

## 2024-07-31 PROCEDURE — 63710000001 INSULIN GLARGINE PER 5 UNITS: Performed by: PHYSICIAN ASSISTANT

## 2024-07-31 RX ORDER — POLYMYXIN B SULFATE AND TRIMETHOPRIM 1; 10000 MG/ML; [USP'U]/ML
1 SOLUTION OPHTHALMIC 4 TIMES DAILY
Status: DISCONTINUED | OUTPATIENT
Start: 2024-07-31 | End: 2024-08-03

## 2024-07-31 RX ADMIN — DICLOFENAC SODIUM 4 G: 10 GEL TOPICAL at 20:38

## 2024-07-31 RX ADMIN — BISMUTH SUBSALICYLATE 524 MG: 262 TABLET, CHEWABLE ORAL at 04:45

## 2024-07-31 RX ADMIN — ATORVASTATIN CALCIUM 10 MG: 10 TABLET, FILM COATED ORAL at 20:38

## 2024-07-31 RX ADMIN — CYANOCOBALAMIN TAB 500 MCG 1000 MCG: 500 TAB at 10:39

## 2024-07-31 RX ADMIN — POLYMYXIN B SULFATE AND TRIMETHOPRIM SULFATE 1 DROP: 10000; 1 SOLUTION/ DROPS OPHTHALMIC at 15:05

## 2024-07-31 RX ADMIN — DICLOFENAC SODIUM 4 G: 10 GEL TOPICAL at 01:28

## 2024-07-31 RX ADMIN — PREGABALIN 100 MG: 100 CAPSULE ORAL at 20:37

## 2024-07-31 RX ADMIN — ONDANSETRON 4 MG: 4 TABLET, ORALLY DISINTEGRATING ORAL at 15:05

## 2024-07-31 RX ADMIN — LISINOPRIL 2.5 MG: 2.5 TABLET ORAL at 10:40

## 2024-07-31 RX ADMIN — FUROSEMIDE 40 MG: 40 TABLET ORAL at 18:05

## 2024-07-31 RX ADMIN — FUROSEMIDE 40 MG: 40 TABLET ORAL at 10:40

## 2024-07-31 RX ADMIN — POLYMYXIN B SULFATE AND TRIMETHOPRIM SULFATE 1 DROP: 10000; 1 SOLUTION/ DROPS OPHTHALMIC at 18:07

## 2024-07-31 RX ADMIN — MONTELUKAST 10 MG: 10 TABLET, FILM COATED ORAL at 20:38

## 2024-07-31 RX ADMIN — CETIRIZINE HYDROCHLORIDE 10 MG: 10 TABLET, FILM COATED ORAL at 10:40

## 2024-07-31 RX ADMIN — Medication 10 MG: at 20:38

## 2024-07-31 RX ADMIN — DICLOFENAC SODIUM 4 G: 10 GEL TOPICAL at 10:41

## 2024-07-31 RX ADMIN — DICLOFENAC SODIUM 4 G: 10 GEL TOPICAL at 15:05

## 2024-07-31 RX ADMIN — BACLOFEN 10 MG: 10 TABLET ORAL at 18:05

## 2024-07-31 RX ADMIN — APIXABAN 5 MG: 5 TABLET, FILM COATED ORAL at 10:40

## 2024-07-31 RX ADMIN — BACLOFEN 10 MG: 10 TABLET ORAL at 10:40

## 2024-07-31 RX ADMIN — OXYCODONE AND ACETAMINOPHEN 1 TABLET: 10; 325 TABLET ORAL at 18:05

## 2024-07-31 RX ADMIN — IBUPROFEN 400 MG: 400 TABLET ORAL at 04:45

## 2024-07-31 RX ADMIN — PREGABALIN 100 MG: 100 CAPSULE ORAL at 18:05

## 2024-07-31 RX ADMIN — Medication 1 APPLICATION: at 15:05

## 2024-07-31 RX ADMIN — SERTRALINE HYDROCHLORIDE 50 MG: 50 TABLET ORAL at 20:38

## 2024-07-31 RX ADMIN — PREGABALIN 100 MG: 100 CAPSULE ORAL at 10:40

## 2024-07-31 RX ADMIN — APIXABAN 5 MG: 5 TABLET, FILM COATED ORAL at 20:38

## 2024-07-31 RX ADMIN — OXYCODONE AND ACETAMINOPHEN 1 TABLET: 10; 325 TABLET ORAL at 10:39

## 2024-07-31 RX ADMIN — POLYMYXIN B SULFATE AND TRIMETHOPRIM SULFATE 1 DROP: 10000; 1 SOLUTION/ DROPS OPHTHALMIC at 20:38

## 2024-07-31 RX ADMIN — EMPAGLIFLOZIN 10 MG: 10 TABLET, FILM COATED ORAL at 10:40

## 2024-07-31 RX ADMIN — INSULIN GLARGINE 22 UNITS: 100 INJECTION, SOLUTION SUBCUTANEOUS at 10:41

## 2024-07-31 RX ADMIN — FERROUS SULFATE TAB 325 MG (65 MG ELEMENTAL FE) 325 MG: 325 (65 FE) TAB at 10:39

## 2024-07-31 RX ADMIN — INSULIN GLARGINE 22 UNITS: 100 INJECTION, SOLUTION SUBCUTANEOUS at 20:37

## 2024-07-31 NOTE — PROGRESS NOTES
[facility]   Hospitalist Progress Note  Date: 2024  Patient Name: Jose Shaikh  : 1960  MRN: [id number]  Date of admission: 2023      Subjective   Subjective     Chief Complaint: Weakness    Summary: Jose Shaikh is a 63 y.o. male  initially hospitalized on 9/10/2023, prolonged hospitalization for treatment of management of generalized weakness, deconditioning, with acute issues of diarrhea, C. difficile negative.  Diarrhea improved.  Had small hematoma after ambulating on his foot.  Unfortunately with exhaustive efforts to try to place this gentleman after 39 days of hospitalization, we are unable to find a facility that will accept him.  He has no further acute needs or requirements for inpatient monitoring and management, his labs and vitals are stable, he is tolerating oral intake, and has been refusing turns and repositionings and other interventions with nursing staff despite recommendations.  Patient discharged in hemodynamically stable condition on 10/19/2023 to follow-up with PCP within 1 week. Unfortunately we cannot solve all of his social issues during this hospitalization due to lack of resources and participation from the patient's perspective despite all our efforts.  Patient has a financial means of making arrangements at home.  Patient appealed his discharge.  Lost his appeal.  LCD: 10/20/23, PFL beginning: 10/21/23 @ 12pm.  Due to an inability to place the patient in a.m. nursing home he has been placed in our skilled nursing facility until arrangements can be made or until he is able to care for himself.      Interval Followup: Patient and nursing staff reported red irritated eye with purulence concern for conjunctivitis adding Polytrim      Review of systems: All systems reviewed and negative except what is noted above in interval follow-up   Objective   Objective     Vitals:   Temp:  [97.3 °F (36.3 °C)-98 °F (36.7 °C)] 98 °F (36.7 °C)  Heart Rate:  [73-79] 79  Resp:   [16] 16  BP: ()/(57-60) 94/58    Physical Exam   Constitutional: No distress.  Alert and conversational.  Respiratory: No wheeze noted.  GI: Abdomen: Obese  Neuro/psych:  Calm mood.  No dysarthria.  Extremities: Mild trace-1 lower extremity edema noted.  Right TMA.    Result Review    Result Review:  I have personally reviewed the results from the time of this admission to 7/31/2024 15:53 EDT and agree with these findings:  [x]  Laboratory  [x]  Microbiology  []  Radiology  []  EKG/Telemetry   []  Cardiology/Vascular   []  Pathology  []  Old records  []  Other:    Assessment & Plan   Assessment / Plan   Assessment:  Hospital-acquired weakness  Hx of Influenza A  Hx of C. difficile diarrhea treated  Generalized weakness  Deconditioning  DM (Kami Garcia and PCP)  HTN  PAF (on Eliquis; previously seen Dr. 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    CODE STATUS:   Code Status (Patient has no pulse and is not breathing): CPR (Attempt to Resuscitate)  Medical Interventions (Patient has pulse or is breathing): Full Support

## 2024-07-31 NOTE — PLAN OF CARE
Goal Outcome Evaluation:  Plan of Care Reviewed With: patient        Progress: no change  Outcome Evaluation: Alert and oriented x4; able to make needs known to staff. Continues to refuse turns but assists with repositioning when incontinent. Incontinent of bowel multiple small amounts this shift and urine x1. Percocet, Motrin and Baclofen administered for complaints of left hip pain with some relief. Pepto given x2; resident states it helps with gas and loose stool. Blood sugar 171 at bedtime; insulin given per MAR. B/P 104/58. Call light within reach; care plan ongoing.

## 2024-08-01 LAB
GLUCOSE BLDC GLUCOMTR-MCNC: 117 MG/DL (ref 70–99)
GLUCOSE BLDC GLUCOMTR-MCNC: 118 MG/DL (ref 70–99)
GLUCOSE BLDC GLUCOMTR-MCNC: 152 MG/DL (ref 70–99)
GLUCOSE BLDC GLUCOMTR-MCNC: 155 MG/DL (ref 70–99)

## 2024-08-01 PROCEDURE — 63710000001 INSULIN GLARGINE PER 5 UNITS: Performed by: PHYSICIAN ASSISTANT

## 2024-08-01 PROCEDURE — 63710000001 INSULIN LISPRO (HUMAN) PER 5 UNITS: Performed by: PHYSICIAN ASSISTANT

## 2024-08-01 PROCEDURE — 82948 REAGENT STRIP/BLOOD GLUCOSE: CPT

## 2024-08-01 RX ADMIN — BACLOFEN 10 MG: 10 TABLET ORAL at 18:44

## 2024-08-01 RX ADMIN — APIXABAN 5 MG: 5 TABLET, FILM COATED ORAL at 08:48

## 2024-08-01 RX ADMIN — PREGABALIN 100 MG: 100 CAPSULE ORAL at 20:38

## 2024-08-01 RX ADMIN — DICLOFENAC SODIUM 4 G: 10 GEL TOPICAL at 14:47

## 2024-08-01 RX ADMIN — CETIRIZINE HYDROCHLORIDE 10 MG: 10 TABLET, FILM COATED ORAL at 08:47

## 2024-08-01 RX ADMIN — APIXABAN 5 MG: 5 TABLET, FILM COATED ORAL at 20:38

## 2024-08-01 RX ADMIN — DICLOFENAC SODIUM 4 G: 10 GEL TOPICAL at 08:48

## 2024-08-01 RX ADMIN — POLYMYXIN B SULFATE AND TRIMETHOPRIM SULFATE 1 DROP: 10000; 1 SOLUTION/ DROPS OPHTHALMIC at 18:01

## 2024-08-01 RX ADMIN — POLYMYXIN B SULFATE AND TRIMETHOPRIM SULFATE 1 DROP: 10000; 1 SOLUTION/ DROPS OPHTHALMIC at 12:36

## 2024-08-01 RX ADMIN — CYANOCOBALAMIN TAB 500 MCG 1000 MCG: 500 TAB at 08:47

## 2024-08-01 RX ADMIN — ACETAMINOPHEN 650 MG: 325 TABLET ORAL at 08:47

## 2024-08-01 RX ADMIN — EMPAGLIFLOZIN 10 MG: 10 TABLET, FILM COATED ORAL at 08:48

## 2024-08-01 RX ADMIN — POLYMYXIN B SULFATE AND TRIMETHOPRIM SULFATE 1 DROP: 10000; 1 SOLUTION/ DROPS OPHTHALMIC at 20:41

## 2024-08-01 RX ADMIN — IBUPROFEN 400 MG: 400 TABLET ORAL at 16:53

## 2024-08-01 RX ADMIN — DICLOFENAC SODIUM 4 G: 10 GEL TOPICAL at 20:38

## 2024-08-01 RX ADMIN — Medication 1 APPLICATION: at 14:47

## 2024-08-01 RX ADMIN — OXYCODONE AND ACETAMINOPHEN 1 TABLET: 10; 325 TABLET ORAL at 02:12

## 2024-08-01 RX ADMIN — SERTRALINE HYDROCHLORIDE 50 MG: 50 TABLET ORAL at 20:38

## 2024-08-01 RX ADMIN — INSULIN GLARGINE 22 UNITS: 100 INJECTION, SOLUTION SUBCUTANEOUS at 20:38

## 2024-08-01 RX ADMIN — INSULIN LISPRO 3 UNITS: 100 INJECTION, SOLUTION INTRAVENOUS; SUBCUTANEOUS at 18:00

## 2024-08-01 RX ADMIN — INSULIN GLARGINE 22 UNITS: 100 INJECTION, SOLUTION SUBCUTANEOUS at 08:46

## 2024-08-01 RX ADMIN — Medication 10 MG: at 20:38

## 2024-08-01 RX ADMIN — PREGABALIN 100 MG: 100 CAPSULE ORAL at 08:48

## 2024-08-01 RX ADMIN — INSULIN LISPRO 3 UNITS: 100 INJECTION, SOLUTION INTRAVENOUS; SUBCUTANEOUS at 20:38

## 2024-08-01 RX ADMIN — FUROSEMIDE 40 MG: 40 TABLET ORAL at 18:00

## 2024-08-01 RX ADMIN — ATORVASTATIN CALCIUM 10 MG: 10 TABLET, FILM COATED ORAL at 20:38

## 2024-08-01 RX ADMIN — DICLOFENAC SODIUM 4 G: 10 GEL TOPICAL at 02:12

## 2024-08-01 RX ADMIN — BACLOFEN 10 MG: 10 TABLET ORAL at 02:12

## 2024-08-01 RX ADMIN — PREGABALIN 100 MG: 100 CAPSULE ORAL at 16:54

## 2024-08-01 RX ADMIN — ACETAMINOPHEN 650 MG: 325 TABLET ORAL at 23:49

## 2024-08-01 RX ADMIN — MONTELUKAST 10 MG: 10 TABLET, FILM COATED ORAL at 20:38

## 2024-08-01 RX ADMIN — OXYCODONE AND ACETAMINOPHEN 1 TABLET: 10; 325 TABLET ORAL at 10:33

## 2024-08-01 RX ADMIN — POLYMYXIN B SULFATE AND TRIMETHOPRIM SULFATE 1 DROP: 10000; 1 SOLUTION/ DROPS OPHTHALMIC at 08:48

## 2024-08-01 RX ADMIN — IBUPROFEN 400 MG: 400 TABLET ORAL at 05:51

## 2024-08-01 RX ADMIN — BACLOFEN 10 MG: 10 TABLET ORAL at 10:33

## 2024-08-01 RX ADMIN — FERROUS SULFATE TAB 325 MG (65 MG ELEMENTAL FE) 325 MG: 325 (65 FE) TAB at 08:47

## 2024-08-01 RX ADMIN — OXYCODONE AND ACETAMINOPHEN 1 TABLET: 10; 325 TABLET ORAL at 18:44

## 2024-08-01 NOTE — PLAN OF CARE
Goal Outcome Evaluation:  Plan of Care Reviewed With: patient        Progress: improving  Outcome Evaluation: Alert and oriented; able to make needs known. No signifcant changes. B/P running soft. Remains bedridden and shifts weight but refuses turns. Intermittent incontinence of stool continues. Medicated with Percocet and Baclofen for pain. Scheduled Lantus given at bedtime; sliding scale not indicated for blood sugar of 127. Eye drops administered as ordered. No complaints of pain to right eye tonight but has scant drainage.Call light within reach; care plan ongoing.

## 2024-08-02 LAB
GLUCOSE BLDC GLUCOMTR-MCNC: 104 MG/DL (ref 70–99)
GLUCOSE BLDC GLUCOMTR-MCNC: 138 MG/DL (ref 70–99)
GLUCOSE BLDC GLUCOMTR-MCNC: 217 MG/DL (ref 70–99)
GLUCOSE BLDC GLUCOMTR-MCNC: 77 MG/DL (ref 70–99)

## 2024-08-02 PROCEDURE — 63710000001 INSULIN GLARGINE PER 5 UNITS: Performed by: PHYSICIAN ASSISTANT

## 2024-08-02 PROCEDURE — 63710000001 INSULIN LISPRO (HUMAN) PER 5 UNITS: Performed by: PHYSICIAN ASSISTANT

## 2024-08-02 PROCEDURE — 82948 REAGENT STRIP/BLOOD GLUCOSE: CPT

## 2024-08-02 RX ADMIN — POLYMYXIN B SULFATE AND TRIMETHOPRIM SULFATE 1 DROP: 10000; 1 SOLUTION/ DROPS OPHTHALMIC at 17:35

## 2024-08-02 RX ADMIN — IBUPROFEN 400 MG: 400 TABLET ORAL at 20:43

## 2024-08-02 RX ADMIN — BACLOFEN 10 MG: 10 TABLET ORAL at 18:34

## 2024-08-02 RX ADMIN — POLYMYXIN B SULFATE AND TRIMETHOPRIM SULFATE 1 DROP: 10000; 1 SOLUTION/ DROPS OPHTHALMIC at 09:49

## 2024-08-02 RX ADMIN — BISMUTH SUBSALICYLATE 524 MG: 262 TABLET, CHEWABLE ORAL at 20:53

## 2024-08-02 RX ADMIN — PREGABALIN 100 MG: 100 CAPSULE ORAL at 20:43

## 2024-08-02 RX ADMIN — CYANOCOBALAMIN TAB 500 MCG 1000 MCG: 500 TAB at 09:48

## 2024-08-02 RX ADMIN — INSULIN GLARGINE 22 UNITS: 100 INJECTION, SOLUTION SUBCUTANEOUS at 09:48

## 2024-08-02 RX ADMIN — PREGABALIN 100 MG: 100 CAPSULE ORAL at 15:14

## 2024-08-02 RX ADMIN — EMPAGLIFLOZIN 10 MG: 10 TABLET, FILM COATED ORAL at 09:48

## 2024-08-02 RX ADMIN — FUROSEMIDE 40 MG: 40 TABLET ORAL at 17:35

## 2024-08-02 RX ADMIN — APIXABAN 5 MG: 5 TABLET, FILM COATED ORAL at 09:47

## 2024-08-02 RX ADMIN — Medication 10 MG: at 20:43

## 2024-08-02 RX ADMIN — ATORVASTATIN CALCIUM 10 MG: 10 TABLET, FILM COATED ORAL at 20:43

## 2024-08-02 RX ADMIN — FERROUS SULFATE TAB 325 MG (65 MG ELEMENTAL FE) 325 MG: 325 (65 FE) TAB at 09:47

## 2024-08-02 RX ADMIN — SERTRALINE HYDROCHLORIDE 50 MG: 50 TABLET ORAL at 20:43

## 2024-08-02 RX ADMIN — DICLOFENAC SODIUM 4 G: 10 GEL TOPICAL at 02:37

## 2024-08-02 RX ADMIN — INSULIN GLARGINE 22 UNITS: 100 INJECTION, SOLUTION SUBCUTANEOUS at 09:49

## 2024-08-02 RX ADMIN — IBUPROFEN 400 MG: 400 TABLET ORAL at 07:37

## 2024-08-02 RX ADMIN — OXYCODONE AND ACETAMINOPHEN 1 TABLET: 10; 325 TABLET ORAL at 18:34

## 2024-08-02 RX ADMIN — CETIRIZINE HYDROCHLORIDE 10 MG: 10 TABLET, FILM COATED ORAL at 09:48

## 2024-08-02 RX ADMIN — APIXABAN 5 MG: 5 TABLET, FILM COATED ORAL at 20:43

## 2024-08-02 RX ADMIN — OXYCODONE AND ACETAMINOPHEN 1 TABLET: 10; 325 TABLET ORAL at 10:57

## 2024-08-02 RX ADMIN — OXYCODONE AND ACETAMINOPHEN 1 TABLET: 10; 325 TABLET ORAL at 02:36

## 2024-08-02 RX ADMIN — INSULIN LISPRO 5 UNITS: 100 INJECTION, SOLUTION INTRAVENOUS; SUBCUTANEOUS at 20:42

## 2024-08-02 RX ADMIN — BACLOFEN 10 MG: 10 TABLET ORAL at 10:57

## 2024-08-02 RX ADMIN — POLYMYXIN B SULFATE AND TRIMETHOPRIM SULFATE 1 DROP: 10000; 1 SOLUTION/ DROPS OPHTHALMIC at 20:45

## 2024-08-02 RX ADMIN — BACLOFEN 10 MG: 10 TABLET ORAL at 02:36

## 2024-08-02 RX ADMIN — POLYMYXIN B SULFATE AND TRIMETHOPRIM SULFATE 1 DROP: 10000; 1 SOLUTION/ DROPS OPHTHALMIC at 12:04

## 2024-08-02 RX ADMIN — MONTELUKAST 10 MG: 10 TABLET, FILM COATED ORAL at 20:43

## 2024-08-02 RX ADMIN — DICLOFENAC SODIUM 4 G: 10 GEL TOPICAL at 09:47

## 2024-08-02 RX ADMIN — ACETAMINOPHEN 650 MG: 325 TABLET ORAL at 15:16

## 2024-08-02 RX ADMIN — DICLOFENAC SODIUM 4 G: 10 GEL TOPICAL at 20:43

## 2024-08-02 RX ADMIN — INSULIN GLARGINE 22 UNITS: 100 INJECTION, SOLUTION SUBCUTANEOUS at 20:42

## 2024-08-02 RX ADMIN — PREGABALIN 100 MG: 100 CAPSULE ORAL at 09:47

## 2024-08-02 RX ADMIN — Medication 1 APPLICATION: at 15:12

## 2024-08-02 RX ADMIN — LISINOPRIL 2.5 MG: 2.5 TABLET ORAL at 09:48

## 2024-08-02 RX ADMIN — FUROSEMIDE 40 MG: 40 TABLET ORAL at 09:47

## 2024-08-02 NOTE — PLAN OF CARE
Goal Outcome Evaluation:  Plan of Care Reviewed With: patient        Progress: no change  Outcome Evaluation: Patient alert and oriented x4 able to make needs known. Lasix and lisinopril held this am due to low BP. PA notified. Patient allowed RN to perform skin care this am. Loose stools continue. Blood sugar elevated x1 this shift requiring insulin. Eye drops given as ordered. Patient continuing to not turn off of back unless he has an incontinent episode. No new concerns. Continue current POC.

## 2024-08-02 NOTE — PLAN OF CARE
Goal Outcome Evaluation:              Outcome Evaluation: Alert and able to make needs known. Frequent PRNs for c/o pain, effective per patient. Frequently used items within easy reach. Denies wants or needs at this time.                        Sammie Sands RN

## 2024-08-02 NOTE — PLAN OF CARE
Goal Outcome Evaluation:  Plan of Care Reviewed With: patient        Progress: no change  Outcome Evaluation: Alert and oriented x4; able to make needs known to staff. Continues to be incontinent of loose stool and urine at times; utilizes urinal and bedpan also. Medicated with Tylenol, Baclofen and Percocet for complaints of left hip pain with some relief; states pain never goes away. Insulin given per MAR for bedtime blood sugar of 152. Call light within reach; care plan ongoing.

## 2024-08-03 LAB
GLUCOSE BLDC GLUCOMTR-MCNC: 107 MG/DL (ref 70–99)
GLUCOSE BLDC GLUCOMTR-MCNC: 110 MG/DL (ref 70–99)
GLUCOSE BLDC GLUCOMTR-MCNC: 132 MG/DL (ref 70–99)
GLUCOSE BLDC GLUCOMTR-MCNC: 148 MG/DL (ref 70–99)
GLUCOSE BLDC GLUCOMTR-MCNC: 181 MG/DL (ref 70–99)

## 2024-08-03 PROCEDURE — 63710000001 INSULIN GLARGINE PER 5 UNITS: Performed by: PHYSICIAN ASSISTANT

## 2024-08-03 PROCEDURE — 82948 REAGENT STRIP/BLOOD GLUCOSE: CPT

## 2024-08-03 RX ORDER — ERYTHROMYCIN 5 MG/G
OINTMENT OPHTHALMIC EVERY 12 HOURS
Status: DISCONTINUED | OUTPATIENT
Start: 2024-08-03 | End: 2024-08-09

## 2024-08-03 RX ADMIN — SERTRALINE HYDROCHLORIDE 50 MG: 50 TABLET ORAL at 20:36

## 2024-08-03 RX ADMIN — OXYCODONE AND ACETAMINOPHEN 1 TABLET: 10; 325 TABLET ORAL at 03:03

## 2024-08-03 RX ADMIN — FUROSEMIDE 40 MG: 40 TABLET ORAL at 17:18

## 2024-08-03 RX ADMIN — BACLOFEN 10 MG: 10 TABLET ORAL at 03:03

## 2024-08-03 RX ADMIN — DICLOFENAC SODIUM 4 G: 10 GEL TOPICAL at 01:19

## 2024-08-03 RX ADMIN — INSULIN GLARGINE 22 UNITS: 100 INJECTION, SOLUTION SUBCUTANEOUS at 08:20

## 2024-08-03 RX ADMIN — Medication 10 MG: at 20:35

## 2024-08-03 RX ADMIN — ATORVASTATIN CALCIUM 10 MG: 10 TABLET, FILM COATED ORAL at 20:35

## 2024-08-03 RX ADMIN — INSULIN GLARGINE 22 UNITS: 100 INJECTION, SOLUTION SUBCUTANEOUS at 20:34

## 2024-08-03 RX ADMIN — ACETAMINOPHEN 650 MG: 325 TABLET ORAL at 07:26

## 2024-08-03 RX ADMIN — ACETAMINOPHEN 650 MG: 325 TABLET ORAL at 17:41

## 2024-08-03 RX ADMIN — ERYTHROMYCIN: 5 OINTMENT OPHTHALMIC at 20:35

## 2024-08-03 RX ADMIN — BACLOFEN 10 MG: 10 TABLET ORAL at 19:34

## 2024-08-03 RX ADMIN — PREGABALIN 100 MG: 100 CAPSULE ORAL at 20:36

## 2024-08-03 RX ADMIN — PREGABALIN 100 MG: 100 CAPSULE ORAL at 16:08

## 2024-08-03 RX ADMIN — FERROUS SULFATE TAB 325 MG (65 MG ELEMENTAL FE) 325 MG: 325 (65 FE) TAB at 08:21

## 2024-08-03 RX ADMIN — PREGABALIN 100 MG: 100 CAPSULE ORAL at 08:20

## 2024-08-03 RX ADMIN — OXYCODONE AND ACETAMINOPHEN 1 TABLET: 10; 325 TABLET ORAL at 19:33

## 2024-08-03 RX ADMIN — APIXABAN 5 MG: 5 TABLET, FILM COATED ORAL at 20:36

## 2024-08-03 RX ADMIN — DICLOFENAC SODIUM 4 G: 10 GEL TOPICAL at 19:34

## 2024-08-03 RX ADMIN — BACLOFEN 10 MG: 10 TABLET ORAL at 11:01

## 2024-08-03 RX ADMIN — APIXABAN 5 MG: 5 TABLET, FILM COATED ORAL at 08:21

## 2024-08-03 RX ADMIN — OXYCODONE AND ACETAMINOPHEN 1 TABLET: 10; 325 TABLET ORAL at 11:01

## 2024-08-03 RX ADMIN — MONTELUKAST 10 MG: 10 TABLET, FILM COATED ORAL at 20:36

## 2024-08-03 RX ADMIN — CETIRIZINE HYDROCHLORIDE 10 MG: 10 TABLET, FILM COATED ORAL at 08:20

## 2024-08-03 RX ADMIN — POLYMYXIN B SULFATE AND TRIMETHOPRIM SULFATE 1 DROP: 10000; 1 SOLUTION/ DROPS OPHTHALMIC at 08:24

## 2024-08-03 RX ADMIN — CYANOCOBALAMIN TAB 500 MCG 1000 MCG: 500 TAB at 08:21

## 2024-08-03 RX ADMIN — EMPAGLIFLOZIN 10 MG: 10 TABLET, FILM COATED ORAL at 08:21

## 2024-08-03 RX ADMIN — DICLOFENAC SODIUM 4 G: 10 GEL TOPICAL at 08:20

## 2024-08-03 RX ADMIN — DICLOFENAC SODIUM 4 G: 10 GEL TOPICAL at 16:08

## 2024-08-03 NOTE — PLAN OF CARE
Goal Outcome Evaluation:  Plan of Care Reviewed With: patient        Progress: no change  Outcome Evaluation: Resident alert, oriented, able to make needs known to staff. remains bed bound by choice. Refused exercises at bedside. Gets up once a week for weekly weight but refused to get up on Wednesday to get weight done. Blood glucose stable for all 3 meals. Medicated for prn pain with baclofen and percocet x1, effective. prn headache x1 with Tylenol, effective. Will continue current plan of care

## 2024-08-03 NOTE — PLAN OF CARE
Goal Outcome Evaluation:  Plan of Care Reviewed With: patient        Progress: no change  Outcome Evaluation: No significant change. Ibuprofen, baclofen, and percocet administered for neck, left shoulder, and left hip pain. Pepto-bismol administered per patient request for frequent BM. Continue plan of care.

## 2024-08-04 LAB
GLUCOSE BLDC GLUCOMTR-MCNC: 110 MG/DL (ref 70–99)
GLUCOSE BLDC GLUCOMTR-MCNC: 121 MG/DL (ref 70–99)
GLUCOSE BLDC GLUCOMTR-MCNC: 130 MG/DL (ref 70–99)
GLUCOSE BLDC GLUCOMTR-MCNC: 182 MG/DL (ref 70–99)

## 2024-08-04 PROCEDURE — 63710000001 INSULIN GLARGINE PER 5 UNITS: Performed by: PHYSICIAN ASSISTANT

## 2024-08-04 PROCEDURE — 63710000001 INSULIN LISPRO (HUMAN) PER 5 UNITS: Performed by: PHYSICIAN ASSISTANT

## 2024-08-04 PROCEDURE — 82948 REAGENT STRIP/BLOOD GLUCOSE: CPT

## 2024-08-04 PROCEDURE — 63710000001 ONDANSETRON ODT 4 MG TABLET DISPERSIBLE: Performed by: PHYSICIAN ASSISTANT

## 2024-08-04 RX ORDER — FLUTICASONE PROPIONATE 50 MCG
2 SPRAY, SUSPENSION (ML) NASAL DAILY
Status: DISCONTINUED | OUTPATIENT
Start: 2024-08-04 | End: 2024-08-30

## 2024-08-04 RX ORDER — OXYCODONE AND ACETAMINOPHEN 7.5; 325 MG/1; MG/1
1 TABLET ORAL EVERY 6 HOURS PRN
Status: DISPENSED | OUTPATIENT
Start: 2024-08-04 | End: 2024-08-09

## 2024-08-04 RX ADMIN — APIXABAN 5 MG: 5 TABLET, FILM COATED ORAL at 08:13

## 2024-08-04 RX ADMIN — ATORVASTATIN CALCIUM 10 MG: 10 TABLET, FILM COATED ORAL at 20:34

## 2024-08-04 RX ADMIN — OXYCODONE HYDROCHLORIDE AND ACETAMINOPHEN 1 TABLET: 7.5; 325 TABLET ORAL at 20:34

## 2024-08-04 RX ADMIN — PREGABALIN 100 MG: 100 CAPSULE ORAL at 08:13

## 2024-08-04 RX ADMIN — PREGABALIN 100 MG: 100 CAPSULE ORAL at 16:13

## 2024-08-04 RX ADMIN — DICLOFENAC SODIUM 4 G: 10 GEL TOPICAL at 20:35

## 2024-08-04 RX ADMIN — PREGABALIN 100 MG: 100 CAPSULE ORAL at 20:34

## 2024-08-04 RX ADMIN — DICLOFENAC SODIUM 4 G: 10 GEL TOPICAL at 02:23

## 2024-08-04 RX ADMIN — APIXABAN 5 MG: 5 TABLET, FILM COATED ORAL at 20:35

## 2024-08-04 RX ADMIN — OXYCODONE AND ACETAMINOPHEN 1 TABLET: 10; 325 TABLET ORAL at 03:36

## 2024-08-04 RX ADMIN — CYANOCOBALAMIN TAB 500 MCG 1000 MCG: 500 TAB at 08:13

## 2024-08-04 RX ADMIN — SERTRALINE HYDROCHLORIDE 50 MG: 50 TABLET ORAL at 20:35

## 2024-08-04 RX ADMIN — ACETAMINOPHEN 650 MG: 325 TABLET ORAL at 23:15

## 2024-08-04 RX ADMIN — ERYTHROMYCIN: 5 OINTMENT OPHTHALMIC at 20:35

## 2024-08-04 RX ADMIN — LISINOPRIL 2.5 MG: 2.5 TABLET ORAL at 08:13

## 2024-08-04 RX ADMIN — INSULIN LISPRO 3 UNITS: 100 INJECTION, SOLUTION INTRAVENOUS; SUBCUTANEOUS at 20:33

## 2024-08-04 RX ADMIN — FLUTICASONE PROPIONATE 2 SPRAY: 50 SPRAY, METERED NASAL at 16:50

## 2024-08-04 RX ADMIN — DICLOFENAC SODIUM 4 G: 10 GEL TOPICAL at 08:13

## 2024-08-04 RX ADMIN — CETIRIZINE HYDROCHLORIDE 10 MG: 10 TABLET, FILM COATED ORAL at 08:13

## 2024-08-04 RX ADMIN — DICLOFENAC SODIUM 4 G: 10 GEL TOPICAL at 16:12

## 2024-08-04 RX ADMIN — BACLOFEN 10 MG: 10 TABLET ORAL at 03:36

## 2024-08-04 RX ADMIN — ONDANSETRON 4 MG: 4 TABLET, ORALLY DISINTEGRATING ORAL at 17:35

## 2024-08-04 RX ADMIN — BISMUTH SUBSALICYLATE 524 MG: 262 TABLET, CHEWABLE ORAL at 09:33

## 2024-08-04 RX ADMIN — OXYCODONE AND ACETAMINOPHEN 1 TABLET: 10; 325 TABLET ORAL at 11:56

## 2024-08-04 RX ADMIN — FUROSEMIDE 40 MG: 40 TABLET ORAL at 08:13

## 2024-08-04 RX ADMIN — IBUPROFEN 400 MG: 400 TABLET ORAL at 02:27

## 2024-08-04 RX ADMIN — BACLOFEN 10 MG: 10 TABLET ORAL at 20:35

## 2024-08-04 RX ADMIN — POLYETHYLENE GLYCOL 400 AND PROPYLENE GLYCOL 1 DROP: 4; 3 SOLUTION/ DROPS OPHTHALMIC at 23:22

## 2024-08-04 RX ADMIN — MONTELUKAST 10 MG: 10 TABLET, FILM COATED ORAL at 20:34

## 2024-08-04 RX ADMIN — ACETAMINOPHEN 650 MG: 325 TABLET ORAL at 05:32

## 2024-08-04 RX ADMIN — INSULIN GLARGINE 22 UNITS: 100 INJECTION, SOLUTION SUBCUTANEOUS at 20:33

## 2024-08-04 RX ADMIN — EMPAGLIFLOZIN 10 MG: 10 TABLET, FILM COATED ORAL at 08:13

## 2024-08-04 RX ADMIN — Medication 10 MG: at 20:34

## 2024-08-04 RX ADMIN — IBUPROFEN 400 MG: 400 TABLET ORAL at 17:31

## 2024-08-04 RX ADMIN — ERYTHROMYCIN: 5 OINTMENT OPHTHALMIC at 08:14

## 2024-08-04 RX ADMIN — BACLOFEN 10 MG: 10 TABLET ORAL at 11:56

## 2024-08-04 RX ADMIN — FERROUS SULFATE TAB 325 MG (65 MG ELEMENTAL FE) 325 MG: 325 (65 FE) TAB at 08:13

## 2024-08-04 RX ADMIN — FUROSEMIDE 40 MG: 40 TABLET ORAL at 17:31

## 2024-08-04 RX ADMIN — BISMUTH SUBSALICYLATE 524 MG: 262 TABLET, CHEWABLE ORAL at 00:31

## 2024-08-04 RX ADMIN — INSULIN GLARGINE 22 UNITS: 100 INJECTION, SOLUTION SUBCUTANEOUS at 08:13

## 2024-08-04 NOTE — PLAN OF CARE
Goal Outcome Evaluation:  Plan of Care Reviewed With: patient        Progress: no change  Outcome Evaluation: No significant change. Ibuprofen, tylenol, and percocet administered for left shoulder and hip pain. Continues with eye irritation and redness. Antibiotic eye ointment administered as ordered at HS. Resident expressed dislike concerning drops being discontinued and ointment started. Education provided concerning proper administration of ointment. Continue plan of care.

## 2024-08-04 NOTE — PROGRESS NOTES
[facility]   Hospitalist Progress Note  Date: 2024  Patient Name: Jose Shaikh  : 1960  MRN: [id number]  Date of admission: 2023      Subjective   Subjective     Chief Complaint: Weakness    Summary: Jose Shaikh is a 63 y.o. male  initially hospitalized on 9/10/2023, prolonged hospitalization for treatment of management of generalized weakness, deconditioning, with acute issues of diarrhea, C. difficile negative.  Diarrhea improved.  Had small hematoma after ambulating on his foot.  Unfortunately with exhaustive efforts to try to place this gentleman after 39 days of hospitalization, we are unable to find a facility that will accept him.  He has no further acute needs or requirements for inpatient monitoring and management, his labs and vitals are stable, he is tolerating oral intake, and has been refusing turns and repositionings and other interventions with nursing staff despite recommendations.  Patient discharged in hemodynamically stable condition on 10/19/2023 to follow-up with PCP within 1 week. Unfortunately we cannot solve all of his social issues during this hospitalization due to lack of resources and participation from the patient's perspective despite all our efforts.  Patient has a financial means of making arrangements at home.  Patient appealed his discharge.  Lost his appeal.  LCD: 10/20/23, PFL beginning: 10/21/23 @ 12pm.  Due to an inability to place the patient in a.m. nursing home he has been placed in our skilled nursing facility until arrangements can be made or until he is able to care for himself.      Interval Followup: Following up on conjunctivitis. He reports it is somewhat better. He thinks his leg swelling is better.  Patient reports that he is doing very little Door-Dashing of food since our conversation (staff reports otherwise). He is routinely refusing weights that we have ordered to monitor his clinical progress and volume status. Staff reports that he  "asks to be awakened to get pain medication and will report it as a 10/10 when showing now outward signs of distress whatsoever. Patient says that he \"wishes they would come weigh him;\" when I informed him that I am aware he is refusing the weights routinely he attempted to redirect the conversation. Apparently told nurse that he \"hated her\" today because she didn't bring a narcotic for his blood sugar check. Also informed that he Door-Dashed [employer] very shortly after our conversation about the importance of correct diet as an attempt to improve his functional status.    Objective   Objective     Vitals:   Temp:  [97.6 °F (36.4 °C)-97.7 °F (36.5 °C)] 97.6 °F (36.4 °C)  Heart Rate:  [60-72] 72  Resp:  [16] 16  BP: (103-107)/(57-63) 105/57    Physical Exam   Constitutional: No distress.  Alert and conversational.  Respiratory: No wheeze noted.  GI: Abdomen: Obese  Neuro/psych:  Calm mood.  No dysarthria.  Extremities: chronic edema mike LE    Result Review    Result Review:  I have personally reviewed the results from the time of this admission to 8/4/2024 17:57 EDT and agree with these findings:  []  Laboratory  []  Microbiology  []  Radiology  []  EKG/Telemetry   []  Cardiology/Vascular   []  Pathology  []  Old records  []  Other:    Assessment & Plan   Assessment / Plan   Assessment:  Hospital-acquired weakness  Noncompliance  Hx of Influenza A  Hx of C. difficile diarrhea treated  Generalized weakness  Deconditioning  DM (Kami Garcia and PCP)  HTN  PAF (on Eliquis; previously seen Dr. Duval)  Morbid obesity with BMI 44  Debility with wheelchair dependence  Hx of severe left hip pain  Severe degenerative joint disease of lower extremities  Hx L calcaneus osteomyelitis  Chronic venous stasis  Hx R transmetatarsal amputation  Chronic bilateral foot wounds with right transmetatarsal amputation, healing by secondary intention (wound care clinic; podiatrist Gordon)  Thrombocytopenia  Conjunctivitis      Plan:  For " now the patient continues on Ozempic. However, this benefit is likely being minimized by his noncompliance with recommended diet which has been discussed with him on multiple occasions.   Despite me counseling him at length on the importance of diet adherence, he reportedly continues to Door Dash high calorie, high sodium food.  In fact, within 2 hours of me discussing with this with him today on 8/4/2024 he door dashed [employer].  We have discussed how detrimental this is to his overall health, can deconditioning and even longevity.  We have discussed how this negates the effect of Ozempic.  His mobility will not improve if he does not comply with medical care in a manner that could potentially put him in candidacy for hip surgery.  He may be becoming dependent on his narcotics.  He is apparently telling staff that he wants to be awakened even when he is not in pain to be given a narcotic.  I do not think this is in his best interest and I will be decreasing his narcotics.  Pain medication needs to be provided on an as-needed basis only.  Should he have clinical signs of worsening pain, the patient will be reevaluated and reassessed for appropriate modifications in his pain regimen.  The last time he allowed staff to weigh him was on 7/22/2024.  At that point his weight was 344 pounds.  More than 2 months earlier, 5/8/2024 his weight was 340 pounds. I have requested the patient be re-weighed today.   According to this, he has gained 4 pounds in the last 2 months or so, likely related to his nonadherence to diet.  If he is not going to adhere to diet and is going to gain weight despite Ozempic then I do not think the benefit of continuing this medication is worth the risk and I will consider discontinuing it if it is not providing any benefit.  Given his degree of debility and noncompliance with medical recommendations, I think palliative care consultation is appropriate.  Patient's long-term prognosis is  poor.  Discussed with staff at length; appreciate the work they've done for the patient.    DVT prophylaxis:  Pharmacologic VTE prophylaxis orders are present.    CODE STATUS:   Code Status (Patient has no pulse and is not breathing): CPR (Attempt to Resuscitate)  Medical Interventions (Patient has pulse or is breathing): Full Support    Electronically signed by Jose Maya MD, 08/04/24, 6:10 PM EDT.

## 2024-08-04 NOTE — PLAN OF CARE
"Goal Outcome Evaluation:  Plan of Care Reviewed With: patient        Progress: no change  Outcome Evaluation: Resident alert, oriented able to make needs known to staff. remains bed bound. resident had several incontinet episodes this shift, bowel as well as urine. Receide pepto Bismol prn x1, received PCT and baclofen prn x1, effective. Requested Ibuprofen this evening for continued pain. Medicated for prn nausea this evening. opthalmic ointment applied this am, tolerated well. MD and PA in for f/u visit. Resident doordashed [employer] this evening. States it was just a salad he has to fix up, including, rice, beens, cheese, meat.....explained to residnt that these are still not good diabetic choices d/t rice and beans being starch and carbs, resident stated \"why dont you run and tell the docor on me\". Will continue current POC.                               "

## 2024-08-05 LAB
GLUCOSE BLDC GLUCOMTR-MCNC: 108 MG/DL (ref 70–99)
GLUCOSE BLDC GLUCOMTR-MCNC: 116 MG/DL (ref 70–99)
GLUCOSE BLDC GLUCOMTR-MCNC: 131 MG/DL (ref 70–99)
GLUCOSE BLDC GLUCOMTR-MCNC: 179 MG/DL (ref 70–99)

## 2024-08-05 PROCEDURE — 82948 REAGENT STRIP/BLOOD GLUCOSE: CPT

## 2024-08-05 PROCEDURE — 63710000001 INSULIN LISPRO (HUMAN) PER 5 UNITS: Performed by: PHYSICIAN ASSISTANT

## 2024-08-05 PROCEDURE — 63710000001 INSULIN GLARGINE PER 5 UNITS: Performed by: PHYSICIAN ASSISTANT

## 2024-08-05 RX ADMIN — ACETAMINOPHEN 650 MG: 325 TABLET ORAL at 06:41

## 2024-08-05 RX ADMIN — DICLOFENAC SODIUM 4 G: 10 GEL TOPICAL at 15:08

## 2024-08-05 RX ADMIN — DICLOFENAC SODIUM 4 G: 10 GEL TOPICAL at 02:41

## 2024-08-05 RX ADMIN — APIXABAN 5 MG: 5 TABLET, FILM COATED ORAL at 20:31

## 2024-08-05 RX ADMIN — MONTELUKAST 10 MG: 10 TABLET, FILM COATED ORAL at 20:30

## 2024-08-05 RX ADMIN — ERYTHROMYCIN: 5 OINTMENT OPHTHALMIC at 20:35

## 2024-08-05 RX ADMIN — PREGABALIN 100 MG: 100 CAPSULE ORAL at 20:29

## 2024-08-05 RX ADMIN — FUROSEMIDE 40 MG: 40 TABLET ORAL at 18:02

## 2024-08-05 RX ADMIN — SERTRALINE HYDROCHLORIDE 50 MG: 50 TABLET ORAL at 20:30

## 2024-08-05 RX ADMIN — INSULIN LISPRO 3 UNITS: 100 INJECTION, SOLUTION INTRAVENOUS; SUBCUTANEOUS at 20:32

## 2024-08-05 RX ADMIN — FLUTICASONE PROPIONATE 2 SPRAY: 50 SPRAY, METERED NASAL at 09:35

## 2024-08-05 RX ADMIN — ATORVASTATIN CALCIUM 10 MG: 10 TABLET, FILM COATED ORAL at 20:31

## 2024-08-05 RX ADMIN — Medication 10 MG: at 20:31

## 2024-08-05 RX ADMIN — CYANOCOBALAMIN TAB 500 MCG 1000 MCG: 500 TAB at 09:34

## 2024-08-05 RX ADMIN — CETIRIZINE HYDROCHLORIDE 10 MG: 10 TABLET, FILM COATED ORAL at 09:34

## 2024-08-05 RX ADMIN — BACLOFEN 10 MG: 10 TABLET ORAL at 04:42

## 2024-08-05 RX ADMIN — OXYCODONE HYDROCHLORIDE AND ACETAMINOPHEN 1 TABLET: 7.5; 325 TABLET ORAL at 21:56

## 2024-08-05 RX ADMIN — POLYETHYLENE GLYCOL 400 AND PROPYLENE GLYCOL 1 DROP: 4; 3 SOLUTION/ DROPS OPHTHALMIC at 23:12

## 2024-08-05 RX ADMIN — PREGABALIN 100 MG: 100 CAPSULE ORAL at 09:34

## 2024-08-05 RX ADMIN — OXYCODONE HYDROCHLORIDE AND ACETAMINOPHEN 1 TABLET: 7.5; 325 TABLET ORAL at 13:02

## 2024-08-05 RX ADMIN — ERYTHROMYCIN: 5 OINTMENT OPHTHALMIC at 09:37

## 2024-08-05 RX ADMIN — BACLOFEN 10 MG: 10 TABLET ORAL at 21:56

## 2024-08-05 RX ADMIN — FUROSEMIDE 40 MG: 40 TABLET ORAL at 09:34

## 2024-08-05 RX ADMIN — PREGABALIN 100 MG: 100 CAPSULE ORAL at 16:32

## 2024-08-05 RX ADMIN — IBUPROFEN 400 MG: 400 TABLET ORAL at 20:29

## 2024-08-05 RX ADMIN — LISINOPRIL 2.5 MG: 2.5 TABLET ORAL at 09:34

## 2024-08-05 RX ADMIN — INSULIN GLARGINE 22 UNITS: 100 INJECTION, SOLUTION SUBCUTANEOUS at 20:32

## 2024-08-05 RX ADMIN — APIXABAN 5 MG: 5 TABLET, FILM COATED ORAL at 09:34

## 2024-08-05 RX ADMIN — DICLOFENAC SODIUM 4 G: 10 GEL TOPICAL at 07:53

## 2024-08-05 RX ADMIN — FERROUS SULFATE TAB 325 MG (65 MG ELEMENTAL FE) 325 MG: 325 (65 FE) TAB at 07:51

## 2024-08-05 RX ADMIN — INSULIN GLARGINE 22 UNITS: 100 INJECTION, SOLUTION SUBCUTANEOUS at 09:35

## 2024-08-05 RX ADMIN — DICLOFENAC SODIUM 4 G: 10 GEL TOPICAL at 19:31

## 2024-08-05 RX ADMIN — BACLOFEN 10 MG: 10 TABLET ORAL at 13:02

## 2024-08-05 RX ADMIN — EMPAGLIFLOZIN 10 MG: 10 TABLET, FILM COATED ORAL at 09:34

## 2024-08-05 RX ADMIN — OXYCODONE HYDROCHLORIDE AND ACETAMINOPHEN 1 TABLET: 7.5; 325 TABLET ORAL at 04:42

## 2024-08-05 NOTE — PLAN OF CARE
Goal Outcome Evaluation:           Progress: improving  Outcome Evaluation: Alert and oriented and pleasant with staff. x2 max assist for transfers and ambulation. x2 admin PRN pain medication, with relief of symptoms noted. Pt did sit on edge of bed and transfer to standing scale this shift, with improved endurance noted. Sitting up in bed, call light in reach.

## 2024-08-05 NOTE — PLAN OF CARE
Goal Outcome Evaluation:  Plan of Care Reviewed With: patient        Progress: no change  Outcome Evaluation: Alert and oriented. Vital signs stable. Continues with antibiotic eye ointment. Right eye red and itchy. PRN Tylenol, Baclofen, and Percocet administered for left hip and shoulder pain. Tylenol administered for headache. No significant change. Continue plan of care.

## 2024-08-06 LAB
GLUCOSE BLDC GLUCOMTR-MCNC: 115 MG/DL (ref 70–99)
GLUCOSE BLDC GLUCOMTR-MCNC: 131 MG/DL (ref 70–99)
GLUCOSE BLDC GLUCOMTR-MCNC: 181 MG/DL (ref 70–99)
GLUCOSE BLDC GLUCOMTR-MCNC: 261 MG/DL (ref 70–99)
GLUCOSE BLDC GLUCOMTR-MCNC: 99 MG/DL (ref 70–99)

## 2024-08-06 PROCEDURE — 63710000001 INSULIN GLARGINE PER 5 UNITS: Performed by: PHYSICIAN ASSISTANT

## 2024-08-06 PROCEDURE — 63710000001 INSULIN LISPRO (HUMAN) PER 5 UNITS: Performed by: PHYSICIAN ASSISTANT

## 2024-08-06 PROCEDURE — 63710000001 ONDANSETRON ODT 4 MG TABLET DISPERSIBLE: Performed by: PHYSICIAN ASSISTANT

## 2024-08-06 PROCEDURE — 82948 REAGENT STRIP/BLOOD GLUCOSE: CPT

## 2024-08-06 RX ADMIN — FERROUS SULFATE TAB 325 MG (65 MG ELEMENTAL FE) 325 MG: 325 (65 FE) TAB at 08:05

## 2024-08-06 RX ADMIN — INSULIN LISPRO 3 UNITS: 100 INJECTION, SOLUTION INTRAVENOUS; SUBCUTANEOUS at 17:39

## 2024-08-06 RX ADMIN — FUROSEMIDE 40 MG: 40 TABLET ORAL at 17:39

## 2024-08-06 RX ADMIN — PREGABALIN 100 MG: 100 CAPSULE ORAL at 15:40

## 2024-08-06 RX ADMIN — DICLOFENAC SODIUM 4 G: 10 GEL TOPICAL at 20:47

## 2024-08-06 RX ADMIN — ATORVASTATIN CALCIUM 10 MG: 10 TABLET, FILM COATED ORAL at 20:43

## 2024-08-06 RX ADMIN — MONTELUKAST 10 MG: 10 TABLET, FILM COATED ORAL at 20:43

## 2024-08-06 RX ADMIN — INSULIN GLARGINE 22 UNITS: 100 INJECTION, SOLUTION SUBCUTANEOUS at 08:48

## 2024-08-06 RX ADMIN — SERTRALINE HYDROCHLORIDE 50 MG: 50 TABLET ORAL at 20:44

## 2024-08-06 RX ADMIN — DICLOFENAC SODIUM 4 G: 10 GEL TOPICAL at 08:06

## 2024-08-06 RX ADMIN — DICLOFENAC SODIUM 4 G: 10 GEL TOPICAL at 14:51

## 2024-08-06 RX ADMIN — OXYCODONE HYDROCHLORIDE AND ACETAMINOPHEN 1 TABLET: 7.5; 325 TABLET ORAL at 06:08

## 2024-08-06 RX ADMIN — PREGABALIN 100 MG: 100 CAPSULE ORAL at 20:44

## 2024-08-06 RX ADMIN — DICLOFENAC SODIUM 4 G: 10 GEL TOPICAL at 01:57

## 2024-08-06 RX ADMIN — ERYTHROMYCIN: 5 OINTMENT OPHTHALMIC at 08:51

## 2024-08-06 RX ADMIN — BACLOFEN 10 MG: 10 TABLET ORAL at 15:40

## 2024-08-06 RX ADMIN — ACETAMINOPHEN 650 MG: 325 TABLET ORAL at 01:34

## 2024-08-06 RX ADMIN — APIXABAN 5 MG: 5 TABLET, FILM COATED ORAL at 08:48

## 2024-08-06 RX ADMIN — CYANOCOBALAMIN TAB 500 MCG 1000 MCG: 500 TAB at 08:48

## 2024-08-06 RX ADMIN — ERYTHROMYCIN: 5 OINTMENT OPHTHALMIC at 20:50

## 2024-08-06 RX ADMIN — Medication 10 MG: at 20:43

## 2024-08-06 RX ADMIN — CETIRIZINE HYDROCHLORIDE 10 MG: 10 TABLET, FILM COATED ORAL at 08:48

## 2024-08-06 RX ADMIN — ONDANSETRON 4 MG: 4 TABLET, ORALLY DISINTEGRATING ORAL at 15:11

## 2024-08-06 RX ADMIN — PREGABALIN 100 MG: 100 CAPSULE ORAL at 08:48

## 2024-08-06 RX ADMIN — INSULIN GLARGINE 22 UNITS: 100 INJECTION, SOLUTION SUBCUTANEOUS at 20:45

## 2024-08-06 RX ADMIN — EMPAGLIFLOZIN 10 MG: 10 TABLET, FILM COATED ORAL at 08:48

## 2024-08-06 RX ADMIN — BACLOFEN 10 MG: 10 TABLET ORAL at 06:07

## 2024-08-06 RX ADMIN — LISINOPRIL 2.5 MG: 2.5 TABLET ORAL at 08:48

## 2024-08-06 RX ADMIN — FUROSEMIDE 40 MG: 40 TABLET ORAL at 08:48

## 2024-08-06 RX ADMIN — APIXABAN 5 MG: 5 TABLET, FILM COATED ORAL at 20:44

## 2024-08-06 RX ADMIN — IBUPROFEN 400 MG: 400 TABLET ORAL at 22:16

## 2024-08-06 RX ADMIN — OXYCODONE HYDROCHLORIDE AND ACETAMINOPHEN 1 TABLET: 7.5; 325 TABLET ORAL at 15:40

## 2024-08-06 NOTE — PLAN OF CARE
Goal Outcome Evaluation:           Progress: improving  Outcome Evaluation: Alert and oriented and pleasant with staff. x2 Max assist for trnasfers and ambulation. x1 admin PRN pain medication, with relief of symptoms noted. No significant changes noted this shift. Sitting up in bed, call light in reach.

## 2024-08-06 NOTE — PLAN OF CARE
Goal Outcome Evaluation:  Plan of Care Reviewed With: patient        Progress: improving  Outcome Evaluation: No significant changes this shift. He is AO x 4 and VSS. He c/o of pain x3 this shift and was medicated for hip pain and headaches. Will continue with his plan of care. Call light and personal items within reach.

## 2024-08-07 LAB
GLUCOSE BLDC GLUCOMTR-MCNC: 110 MG/DL (ref 70–99)
GLUCOSE BLDC GLUCOMTR-MCNC: 110 MG/DL (ref 70–99)
GLUCOSE BLDC GLUCOMTR-MCNC: 118 MG/DL (ref 70–99)
GLUCOSE BLDC GLUCOMTR-MCNC: 149 MG/DL (ref 70–99)

## 2024-08-07 PROCEDURE — 82948 REAGENT STRIP/BLOOD GLUCOSE: CPT

## 2024-08-07 PROCEDURE — 63710000001 INSULIN GLARGINE PER 5 UNITS: Performed by: PHYSICIAN ASSISTANT

## 2024-08-07 RX ADMIN — BACLOFEN 10 MG: 10 TABLET ORAL at 20:59

## 2024-08-07 RX ADMIN — CYANOCOBALAMIN TAB 500 MCG 1000 MCG: 500 TAB at 09:29

## 2024-08-07 RX ADMIN — APIXABAN 5 MG: 5 TABLET, FILM COATED ORAL at 20:58

## 2024-08-07 RX ADMIN — OXYCODONE HYDROCHLORIDE AND ACETAMINOPHEN 1 TABLET: 7.5; 325 TABLET ORAL at 10:27

## 2024-08-07 RX ADMIN — INSULIN GLARGINE 22 UNITS: 100 INJECTION, SOLUTION SUBCUTANEOUS at 09:29

## 2024-08-07 RX ADMIN — CETIRIZINE HYDROCHLORIDE 10 MG: 10 TABLET, FILM COATED ORAL at 09:29

## 2024-08-07 RX ADMIN — MONTELUKAST 10 MG: 10 TABLET, FILM COATED ORAL at 20:59

## 2024-08-07 RX ADMIN — DICLOFENAC SODIUM 4 G: 10 GEL TOPICAL at 21:03

## 2024-08-07 RX ADMIN — BACLOFEN 10 MG: 10 TABLET ORAL at 10:27

## 2024-08-07 RX ADMIN — OXYCODONE HYDROCHLORIDE AND ACETAMINOPHEN 1 TABLET: 7.5; 325 TABLET ORAL at 00:33

## 2024-08-07 RX ADMIN — ATORVASTATIN CALCIUM 10 MG: 10 TABLET, FILM COATED ORAL at 20:58

## 2024-08-07 RX ADMIN — BACLOFEN 10 MG: 10 TABLET ORAL at 00:33

## 2024-08-07 RX ADMIN — FUROSEMIDE 40 MG: 40 TABLET ORAL at 17:38

## 2024-08-07 RX ADMIN — PREGABALIN 100 MG: 100 CAPSULE ORAL at 09:29

## 2024-08-07 RX ADMIN — DICLOFENAC SODIUM 4 G: 10 GEL TOPICAL at 08:01

## 2024-08-07 RX ADMIN — BISMUTH SUBSALICYLATE 524 MG: 262 TABLET, CHEWABLE ORAL at 05:21

## 2024-08-07 RX ADMIN — ERYTHROMYCIN: 5 OINTMENT OPHTHALMIC at 21:02

## 2024-08-07 RX ADMIN — DICLOFENAC SODIUM 4 G: 10 GEL TOPICAL at 14:58

## 2024-08-07 RX ADMIN — INSULIN GLARGINE 22 UNITS: 100 INJECTION, SOLUTION SUBCUTANEOUS at 21:00

## 2024-08-07 RX ADMIN — PREGABALIN 100 MG: 100 CAPSULE ORAL at 20:59

## 2024-08-07 RX ADMIN — SERTRALINE HYDROCHLORIDE 50 MG: 50 TABLET ORAL at 20:59

## 2024-08-07 RX ADMIN — FERROUS SULFATE TAB 325 MG (65 MG ELEMENTAL FE) 325 MG: 325 (65 FE) TAB at 08:01

## 2024-08-07 RX ADMIN — LISINOPRIL 2.5 MG: 2.5 TABLET ORAL at 09:29

## 2024-08-07 RX ADMIN — OXYCODONE HYDROCHLORIDE AND ACETAMINOPHEN 1 TABLET: 7.5; 325 TABLET ORAL at 20:58

## 2024-08-07 RX ADMIN — PREGABALIN 100 MG: 100 CAPSULE ORAL at 16:26

## 2024-08-07 RX ADMIN — FUROSEMIDE 40 MG: 40 TABLET ORAL at 09:29

## 2024-08-07 RX ADMIN — EMPAGLIFLOZIN 10 MG: 10 TABLET, FILM COATED ORAL at 09:29

## 2024-08-07 RX ADMIN — APIXABAN 5 MG: 5 TABLET, FILM COATED ORAL at 09:29

## 2024-08-07 RX ADMIN — ERYTHROMYCIN: 5 OINTMENT OPHTHALMIC at 09:33

## 2024-08-07 RX ADMIN — Medication 10 MG: at 20:58

## 2024-08-07 RX ADMIN — IBUPROFEN 400 MG: 400 TABLET ORAL at 16:26

## 2024-08-07 NOTE — PLAN OF CARE
Goal Outcome Evaluation:  Plan of Care Reviewed With: patient        Progress: improving  Outcome Evaluation: No significant changes noted. VSS and was medicated for pain x 3, pain level decreased slightly. Will continue with his plan of care. Call light and personal items within reach.

## 2024-08-07 NOTE — CONSULTS
"Purpose of the visit was to evaluate for: goals of care/advanced care planning. Spoke with RN and patient and discussed palliative care, goals of care, advanced care planning, and clarify code status.      Assessment: Patient is currently on SN[facility]- update provider by unit . At time of visit patient laying in bed and is alert and oriented to person, place, time and situation. Introduced self and explained role and purpose of visit. Discussed patients current condition. Patient shares reflection on his extended time here in the hospital. Discussed goals of care- patient reports his goal is to lose 50 pounds in order to get a hip replacement. Patient states if his hip can be replaced then his pain will improve and he can participate more in rehab and get strong enough to return home. Discussed alternative options and patient reports there isn't any alternative options. Discussed non-compliance issues and patient reports he \"doesn't care\" about that and \"no one can be perfect and do that all the time.\" Discussed code status and provided education on cpr/intubation/code event. Patient requests to remain full code at this time- current code status reflects this. Provided education on ACP including education on living will and importance of such. Updated unit .    Provided palliative care contact information and encouraged to call with further questions/concerns.    Delfina LORENZO, RN, CHPN  Palliative Care    "

## 2024-08-07 NOTE — PLAN OF CARE
Goal Outcome Evaluation:  Plan of Care Reviewed With: patient        Progress: improving  Outcome Evaluation: Alert and oriented and pleasant with staff. x2 max assist for transfers and ambualtion. x2 admin prn pain medication, with relief of symptoms noted. Pt transfered to new bariatric bed this shift, prior bed, became unsuitable for use. No other significant issues noted this shift. Sitting up in bed, call light in reach.

## 2024-08-08 LAB
GLUCOSE BLDC GLUCOMTR-MCNC: 109 MG/DL (ref 70–99)
GLUCOSE BLDC GLUCOMTR-MCNC: 113 MG/DL (ref 70–99)
GLUCOSE BLDC GLUCOMTR-MCNC: 113 MG/DL (ref 70–99)
GLUCOSE BLDC GLUCOMTR-MCNC: 114 MG/DL (ref 70–99)

## 2024-08-08 PROCEDURE — 82948 REAGENT STRIP/BLOOD GLUCOSE: CPT

## 2024-08-08 PROCEDURE — 63710000001 INSULIN GLARGINE PER 5 UNITS: Performed by: PHYSICIAN ASSISTANT

## 2024-08-08 RX ADMIN — BACLOFEN 10 MG: 10 TABLET ORAL at 05:33

## 2024-08-08 RX ADMIN — OXYCODONE HYDROCHLORIDE AND ACETAMINOPHEN 1 TABLET: 7.5; 325 TABLET ORAL at 13:30

## 2024-08-08 RX ADMIN — IBUPROFEN 400 MG: 400 TABLET ORAL at 21:03

## 2024-08-08 RX ADMIN — PREGABALIN 100 MG: 100 CAPSULE ORAL at 16:21

## 2024-08-08 RX ADMIN — INSULIN GLARGINE 22 UNITS: 100 INJECTION, SOLUTION SUBCUTANEOUS at 10:00

## 2024-08-08 RX ADMIN — Medication 1 APPLICATION: at 16:00

## 2024-08-08 RX ADMIN — IBUPROFEN 400 MG: 400 TABLET ORAL at 02:34

## 2024-08-08 RX ADMIN — FUROSEMIDE 40 MG: 40 TABLET ORAL at 10:00

## 2024-08-08 RX ADMIN — DICLOFENAC SODIUM 4 G: 10 GEL TOPICAL at 13:31

## 2024-08-08 RX ADMIN — APIXABAN 5 MG: 5 TABLET, FILM COATED ORAL at 21:30

## 2024-08-08 RX ADMIN — EMPAGLIFLOZIN 10 MG: 10 TABLET, FILM COATED ORAL at 10:00

## 2024-08-08 RX ADMIN — CETIRIZINE HYDROCHLORIDE 10 MG: 10 TABLET, FILM COATED ORAL at 10:00

## 2024-08-08 RX ADMIN — BACLOFEN 10 MG: 10 TABLET ORAL at 13:30

## 2024-08-08 RX ADMIN — OXYCODONE HYDROCHLORIDE AND ACETAMINOPHEN 1 TABLET: 7.5; 325 TABLET ORAL at 21:45

## 2024-08-08 RX ADMIN — PREGABALIN 100 MG: 100 CAPSULE ORAL at 10:00

## 2024-08-08 RX ADMIN — INSULIN GLARGINE 22 UNITS: 100 INJECTION, SOLUTION SUBCUTANEOUS at 21:30

## 2024-08-08 RX ADMIN — LISINOPRIL 2.5 MG: 2.5 TABLET ORAL at 10:00

## 2024-08-08 RX ADMIN — BISMUTH SUBSALICYLATE 524 MG: 262 TABLET, CHEWABLE ORAL at 02:35

## 2024-08-08 RX ADMIN — DICLOFENAC SODIUM 4 G: 10 GEL TOPICAL at 02:04

## 2024-08-08 RX ADMIN — DICLOFENAC SODIUM 4 G: 10 GEL TOPICAL at 21:04

## 2024-08-08 RX ADMIN — ERYTHROMYCIN: 5 OINTMENT OPHTHALMIC at 21:30

## 2024-08-08 RX ADMIN — FERROUS SULFATE TAB 325 MG (65 MG ELEMENTAL FE) 325 MG: 325 (65 FE) TAB at 10:00

## 2024-08-08 RX ADMIN — OXYCODONE HYDROCHLORIDE AND ACETAMINOPHEN 1 TABLET: 7.5; 325 TABLET ORAL at 05:33

## 2024-08-08 RX ADMIN — DICLOFENAC SODIUM 4 G: 10 GEL TOPICAL at 07:44

## 2024-08-08 RX ADMIN — Medication 10 MG: at 21:30

## 2024-08-08 RX ADMIN — SERTRALINE HYDROCHLORIDE 50 MG: 50 TABLET ORAL at 21:30

## 2024-08-08 RX ADMIN — PREGABALIN 100 MG: 100 CAPSULE ORAL at 21:30

## 2024-08-08 RX ADMIN — BACLOFEN 10 MG: 10 TABLET ORAL at 21:45

## 2024-08-08 RX ADMIN — ERYTHROMYCIN: 5 OINTMENT OPHTHALMIC at 10:00

## 2024-08-08 RX ADMIN — APIXABAN 5 MG: 5 TABLET, FILM COATED ORAL at 10:00

## 2024-08-08 RX ADMIN — MONTELUKAST 10 MG: 10 TABLET, FILM COATED ORAL at 21:30

## 2024-08-08 RX ADMIN — ATORVASTATIN CALCIUM 10 MG: 10 TABLET, FILM COATED ORAL at 21:30

## 2024-08-08 RX ADMIN — CYANOCOBALAMIN TAB 500 MCG 1000 MCG: 500 TAB at 10:00

## 2024-08-08 RX ADMIN — ACETAMINOPHEN 650 MG: 325 TABLET ORAL at 11:30

## 2024-08-08 NOTE — PLAN OF CARE
Goal Outcome Evaluation:Alert and oriented x4,   x2 admin prn pain medication, with relief of symptoms noted. No other significant issues noted this shift. Patient resting in bed, call light in reach.

## 2024-08-08 NOTE — PLAN OF CARE
Goal Outcome Evaluation:  Plan of Care Reviewed With: patient        Progress: no change  Outcome Evaluation: Alert and oriented x4. Given PRN Tylenol at 1130 for left hip pain. Med effective. Given Percocet and baclofen at 1330 for severe left hip pain/spasms. Meds effective. Voices needs. Con't current POC.

## 2024-08-09 LAB
GLUCOSE BLDC GLUCOMTR-MCNC: 108 MG/DL (ref 70–99)
GLUCOSE BLDC GLUCOMTR-MCNC: 110 MG/DL (ref 70–99)
GLUCOSE BLDC GLUCOMTR-MCNC: 114 MG/DL (ref 70–99)
GLUCOSE BLDC GLUCOMTR-MCNC: 94 MG/DL (ref 70–99)

## 2024-08-09 PROCEDURE — 63710000001 INSULIN GLARGINE PER 5 UNITS: Performed by: PHYSICIAN ASSISTANT

## 2024-08-09 PROCEDURE — 82948 REAGENT STRIP/BLOOD GLUCOSE: CPT

## 2024-08-09 RX ORDER — OXYCODONE AND ACETAMINOPHEN 7.5; 325 MG/1; MG/1
1 TABLET ORAL EVERY 6 HOURS PRN
Status: COMPLETED | OUTPATIENT
Start: 2024-08-09 | End: 2024-08-10

## 2024-08-09 RX ADMIN — MONTELUKAST 10 MG: 10 TABLET, FILM COATED ORAL at 21:43

## 2024-08-09 RX ADMIN — IBUPROFEN 400 MG: 400 TABLET ORAL at 12:03

## 2024-08-09 RX ADMIN — BACLOFEN 10 MG: 10 TABLET ORAL at 23:46

## 2024-08-09 RX ADMIN — SERTRALINE HYDROCHLORIDE 50 MG: 50 TABLET ORAL at 21:42

## 2024-08-09 RX ADMIN — Medication 10 MG: at 21:42

## 2024-08-09 RX ADMIN — INSULIN GLARGINE 22 UNITS: 100 INJECTION, SOLUTION SUBCUTANEOUS at 08:32

## 2024-08-09 RX ADMIN — EMPAGLIFLOZIN 10 MG: 10 TABLET, FILM COATED ORAL at 08:32

## 2024-08-09 RX ADMIN — FERROUS SULFATE TAB 325 MG (65 MG ELEMENTAL FE) 325 MG: 325 (65 FE) TAB at 08:32

## 2024-08-09 RX ADMIN — ACETAMINOPHEN 650 MG: 325 TABLET ORAL at 17:34

## 2024-08-09 RX ADMIN — DICLOFENAC SODIUM 4 G: 10 GEL TOPICAL at 15:25

## 2024-08-09 RX ADMIN — OXYCODONE HYDROCHLORIDE AND ACETAMINOPHEN 1 TABLET: 7.5; 325 TABLET ORAL at 07:22

## 2024-08-09 RX ADMIN — CETIRIZINE HYDROCHLORIDE 10 MG: 10 TABLET, FILM COATED ORAL at 08:32

## 2024-08-09 RX ADMIN — LISINOPRIL 2.5 MG: 2.5 TABLET ORAL at 08:32

## 2024-08-09 RX ADMIN — PREGABALIN 100 MG: 100 CAPSULE ORAL at 08:32

## 2024-08-09 RX ADMIN — ATORVASTATIN CALCIUM 10 MG: 10 TABLET, FILM COATED ORAL at 21:42

## 2024-08-09 RX ADMIN — PREGABALIN 100 MG: 100 CAPSULE ORAL at 17:34

## 2024-08-09 RX ADMIN — ACETAMINOPHEN 650 MG: 325 TABLET ORAL at 21:43

## 2024-08-09 RX ADMIN — ACETAMINOPHEN 650 MG: 325 TABLET ORAL at 05:56

## 2024-08-09 RX ADMIN — BACLOFEN 10 MG: 10 TABLET ORAL at 07:22

## 2024-08-09 RX ADMIN — APIXABAN 5 MG: 5 TABLET, FILM COATED ORAL at 21:42

## 2024-08-09 RX ADMIN — OXYCODONE HYDROCHLORIDE AND ACETAMINOPHEN 1 TABLET: 7.5; 325 TABLET ORAL at 15:25

## 2024-08-09 RX ADMIN — FUROSEMIDE 40 MG: 40 TABLET ORAL at 17:34

## 2024-08-09 RX ADMIN — ERYTHROMYCIN: 5 OINTMENT OPHTHALMIC at 08:36

## 2024-08-09 RX ADMIN — BISMUTH SUBSALICYLATE 524 MG: 262 TABLET, CHEWABLE ORAL at 19:07

## 2024-08-09 RX ADMIN — FUROSEMIDE 40 MG: 40 TABLET ORAL at 08:32

## 2024-08-09 RX ADMIN — INSULIN GLARGINE 22 UNITS: 100 INJECTION, SOLUTION SUBCUTANEOUS at 21:42

## 2024-08-09 RX ADMIN — APIXABAN 5 MG: 5 TABLET, FILM COATED ORAL at 08:32

## 2024-08-09 RX ADMIN — DICLOFENAC SODIUM 4 G: 10 GEL TOPICAL at 20:55

## 2024-08-09 RX ADMIN — CYANOCOBALAMIN TAB 500 MCG 1000 MCG: 500 TAB at 08:32

## 2024-08-09 RX ADMIN — DICLOFENAC SODIUM 4 G: 10 GEL TOPICAL at 08:33

## 2024-08-09 RX ADMIN — BACLOFEN 10 MG: 10 TABLET ORAL at 15:25

## 2024-08-09 RX ADMIN — PREGABALIN 100 MG: 100 CAPSULE ORAL at 21:43

## 2024-08-09 NOTE — PLAN OF CARE
Goal Outcome Evaluation:  Plan of Care Reviewed With: patient        Progress: no change unable to obtain adequate pain control , does not participate in therapy due to pain. Refuses to get out of bed. Still upset that parameters were changed for his surgery to fix his hip. Weight fluctuates due to inconsistencies in his dietary habits. Uses door dash frequently. Continues to receive Ozempic injections. Personal items and call light within reach at bedside. Continue POC.

## 2024-08-09 NOTE — CONSULTS
"Nutrition Services    Patient Name: Jose Shaikh  YOB: 1960  MRN: [id number]  Admission date: 11/6/2023      CLINICAL NUTRITION ASSESSMENT      Reason for Assessment  Follow Up   H&P:  Past Medical History:   Diagnosis Date    Absence of toe of right foot     Acute osteomyelitis of left calcaneus  08/18/2021    Anxiety and depression     Arthritis     Cancer     Chronic pain     STATES HIS PAIN IS 10/10 AAT    Claustrophobia     Corns and callus     Diabetic ulcer of left heel associated with type 2 DM 08/18/2021    Diabetic ulcer of left heel associated with type 2 DM 07/06/2021    Diabetic ulcer of right midfoot associated with type 2 DM 08/18/2021    Difficulty walking     Essential hypertension 08/31/2021    Hammertoe     Hyperlipidemia LDL goal <100 08/31/2021    Ingrown toenail     Obesity     Paroxysmal atrial fibrillation 08/31/2021    Polyneuropathy     Pressure ulcer, stage 1     Tinea unguium     Type 2 diabetes mellitus with polyneuropathy         Current Problems:   Active Hospital Problems    Diagnosis     **Debility     Ulcerated, foot, left, limited to breakdown of skin     Onychomycosis     Ulcer of right foot         Nutrition/Diet History         Narrative   Upon my visit, patient was lying in bed watching tv. Per chart graphics, patient continues with 100% of meal intake. Tells RD that he can't always eat the food d/t his weekly Ozempic shot. Declines offer for a nutrition supplement during times he is unable to tolerate foods. Per nursing notes, pt often refuses weights and  refuses therapies.  Continue with current nutrition intervention and monitor per protocol.         Anthropometrics        Current Height, Weight Height: 188 cm (74.02\")  Weight: (!) 158 kg (348 lb 15.8 oz)   Current BMI Body mass index is 44.79 kg/m².   BMI Classification Obese Class III   % %   Adjusted Body Weight (ABW) 101 kg   Weight Hx  Wt Readings from Last 30 Encounters:   08/05/24 0732 (!) 158 " kg (348 lb 15.8 oz)   07/22/24 0800 (!) 156 kg (344 lb 9.3 oz)   07/08/24 0900 (!) 156 kg (343 lb 14.7 oz)   06/27/24 0745 (!) 156 kg (343 lb 4.1 oz)   05/31/24 1400 (!) 151 kg (333 lb 12.4 oz)   05/22/24 1000 (!) 157 kg (346 lb 2 oz)   05/15/24 0545 (!) 153 kg (338 lb 3 oz)   05/08/24 1100 (!) 154 kg (340 lb 6.2 oz)   05/08/24 1029 (!) 154 kg (340 lb 6.2 oz)   05/01/24 1100 (!) 156 kg (343 lb 14.7 oz)   04/24/24 0900 (!) 159 kg (350 lb 1.5 oz)   04/17/24 1124 (!) 161 kg (355 lb 9.6 oz)   04/11/24 1509 (!) 162 kg (356 lb 11.3 oz)   04/02/24 1118 (!) 157 kg (345 lb 0.3 oz)   03/26/24 1003 (!) 143 kg (314 lb 2.5 oz)   03/18/24 1323 (!) 151 kg (332 lb 3.7 oz)   03/18/24 0500 (!) 171 kg (378 lb 1.4 oz)   03/17/24 0500 (!) 173 kg (380 lb 15.3 oz)   03/16/24 0500 (!) 171 kg (376 lb 15.8 oz)   03/15/24 0500 (!) 161 kg (354 lb 8 oz)   03/14/24 0300 (!) 173 kg (382 lb 4.4 oz)   03/12/24 0500 (!) 158 kg (347 lb 14.2 oz)   03/11/24 0500 (!) 153 kg (337 lb 8.4 oz)   03/10/24 0540 (!) 154 kg (339 lb 4.6 oz)   03/09/24 0500 (!) 153 kg (337 lb 1.3 oz)   03/08/24 1323 (!) 153 kg (337 lb 8 oz)   03/08/24 0500 (!) 157 kg (346 lb 2 oz)   03/06/24 2300 (!) 155 kg (342 lb 2.5 oz)   03/05/24 0415 (!) 155 kg (342 lb 13 oz)   03/04/24 0500 (!) 156 kg (344 lb 9.3 oz)   03/03/24 0500 (!) 156 kg (344 lb 9.3 oz)   03/02/24 0530 (!) 157 kg (346 lb 12.5 oz)   03/01/24 0500 (!) 157 kg (345 lb 3.9 oz)   02/29/24 0500 (!) 157 kg (347 lb 3.6 oz)   02/28/24 0509 (!) 158 kg (347 lb 14.2 oz)   02/27/24 0500 (!) 159 kg (351 lb 3.1 oz)   02/26/24 0500 (!) 159 kg (350 lb 12 oz)   02/25/24 0500 (!) 160 kg (351 lb 10.1 oz)   02/24/24 0500 (!) 160 kg (352 lb 1.2 oz)   02/23/24 0500 (!) 160 kg (353 lb 6.4 oz)   02/22/24 0500 (!) 160 kg (353 lb 13.4 oz)   02/21/24 0514 (!) 162 kg (356 lb 7.7 oz)   02/20/24 0500 (!) 161 kg (355 lb 2.6 oz)   02/19/24 0300 (!) 160 kg (353 lb 6.4 oz)   02/18/24 0500 (!) 162 kg (356 lb 7.7 oz)   02/17/24 0500 (!) 162 kg (357 lb 2.3  oz)   02/16/24 0500 (!) 162 kg (358 lb 0.4 oz)   02/15/24 0532 (!) 163 kg (360 lb 3.7 oz)   02/14/24 0600 (!) 162 kg (357 lb 5.9 oz)   02/13/24 0500 (!) 162 kg (357 lb 9.4 oz)   02/12/24 1352 (!) 160 kg (352 lb 4.7 oz)   02/12/24 0500 (!) 166 kg (367 lb 1.1 oz)   02/11/24 0500 (!) 169 kg (372 lb 2.2 oz)   02/10/24 0500 (!) 168 kg (370 lb 9.5 oz)   02/09/24 0600 (!) 168 kg (370 lb 9.5 oz)   02/08/24 0600 (!) 165 kg (363 lb 15.7 oz)   02/07/24 0600 (!) 166 kg (366 lb 10 oz)   02/06/24 0419 (!) 166 kg (366 lb 10 oz)   02/05/24 0500 (!) 167 kg (367 lb 4.6 oz)   02/04/24 0500 (!) 167 kg (367 lb 8.1 oz)   02/03/24 0500 (!) 166 kg (366 lb 6.5 oz)   02/02/24 0500 (!) 168 kg (369 lb 14.9 oz)   02/01/24 0613 (!) 169 kg (372 lb 5.7 oz)   01/31/24 0500 (!) 168 kg (371 lb 4.1 oz)   01/30/24 0500 (!) 169 kg (372 lb 12.8 oz)   01/29/24 0232 (!) 169 kg (372 lb 5.7 oz)   01/28/24 0500 (!) 167 kg (368 lb 6.2 oz)   01/26/24 0400 (!) 169 kg (372 lb 2.2 oz)   01/25/24 0417 (!) 167 kg (368 lb 9.8 oz)   01/24/24 0608 (!) 168 kg (371 lb 7.6 oz)   01/23/24 0500 (!) 168 kg (369 lb 14.9 oz)   01/22/24 0500 (!) 168 kg (371 lb 4.1 oz)   01/21/24 0500 (!) 168 kg (371 lb 4.1 oz)   01/20/24 0500 (!) 168 kg (371 lb 7.6 oz)   01/19/24 0500 (!) 171 kg (376 lb 1.7 oz)   01/18/24 0500 (!) 172 kg (380 lb 1.2 oz)   01/17/24 0614 (!) 172 kg (379 lb 6.6 oz)   01/16/24 0500 (!) 171 kg (378 lb 1.4 oz)   01/15/24 0500 (!) 169 kg (372 lb 2.2 oz)   01/14/24 0546 (!) 169 kg (373 lb 7.4 oz)   01/13/24 0507 (!) 171 kg (376 lb 5.2 oz)   01/12/24 0600 (!) 170 kg (374 lb 12.5 oz)   01/11/24 0600 (!) 169 kg (371 lb 14.7 oz)   01/10/24 0600 (!) 169 kg (371 lb 11.1 oz)   01/09/24 0500 (!) 168 kg (370 lb 9.5 oz)   01/08/24 0448 (!) 168 kg (370 lb 9.5 oz)   01/07/24 0454 (!) 167 kg (367 lb 15.2 oz)   01/06/24 0500 (!) 166 kg (366 lb 10 oz)   01/05/24 0555 (!) 165 kg (364 lb 6.7 oz)   01/04/24 0500 (!) 165 kg (363 lb 12.1 oz)   01/03/24 0500 (!) 166 kg (365 lb 1.3 oz)    01/02/24 0500 (!) 167 kg (367 lb 4.6 oz)   01/01/24 0500 (!) 166 kg (365 lb 11.9 oz)   12/31/23 0500 (!) 160 kg (352 lb 4.7 oz)   12/30/23 0500 (!) 167 kg (367 lb 15.2 oz)   12/29/23 0500 (!) 166 kg (366 lb 10 oz)   12/28/23 0500 (!) 165 kg (364 lb 13.8 oz)   12/27/23 0500 (!) 166 kg (367 lb 1.1 oz)   12/26/23 0500 (!) 167 kg (367 lb 4.6 oz)   12/25/23 0500 (!) 165 kg (364 lb 3.2 oz)   12/24/23 0500 (!) 164 kg (362 lb 7 oz)   12/23/23 0500 (!) 163 kg (360 lb 0.2 oz)   12/22/23 0600 (!) 163 kg (358 lb 14.5 oz)   12/21/23 0500 (!) 163 kg (359 lb 12.7 oz)   12/20/23 0500 (!) 162 kg (357 lb 9.4 oz)   12/19/23 0550 (!) 163 kg (359 lb 5.6 oz)   12/18/23 0500 (!) 161 kg (355 lb 13.2 oz)   12/17/23 0500 (!) 160 kg (353 lb 13.4 oz)   12/16/23 1541 (!) 160 kg (353 lb 3.2 oz)   12/16/23 0500 (!) 165 kg (364 lb 13.8 oz)   12/15/23 0500 (!) 165 kg (362 lb 10.5 oz)   12/14/23 0500 (!) 157 kg (345 lb 14.4 oz)   12/13/23 0500 (!) 153 kg (337 lb 4.9 oz)   12/12/23 0500 (!) 155 kg (341 lb 0.8 oz)   12/11/23 1049 (!) 155 kg (341 lb 0.8 oz)   12/11/23 0500 (!) 160 kg (353 lb 13.4 oz)   12/10/23 0532 (!) 162 kg (356 lb 7.7 oz)   12/09/23 0500 (!) 151 kg (332 lb 10.8 oz)   12/08/23 0500 (!) 153 kg (336 lb 13.8 oz)   12/06/23 0500 (!) 154 kg (340 lb 6.2 oz)   12/05/23 0607 (!) 161 kg (354 lb 15.1 oz)   12/04/23 0500 (!) 161 kg (354 lb 4.5 oz)   12/03/23 0400 (!) 161 kg (354 lb 11.5 oz)   11/21/23 1155 (!) 162 kg (357 lb 12.8 oz)   11/09/23 0500 (!) 160 kg (353 lb 9.9 oz)   11/06/23 1422 (!) 158 kg (348 lb 5.2 oz)   11/02/23 1155 (!) 158 kg (348 lb 15.8 oz)   09/11/23 0030 (!) 151 kg (334 lb)   09/10/23 1844 (!) 154 kg (338 lb 10 oz)   08/30/23 1112 (!) 157 kg (347 lb)   08/01/23 0932 (!) 158 kg (347 lb 10.7 oz)   07/31/23 2347 (!) 163 kg (358 lb 7.5 oz)   06/16/23 2333 (!) 155 kg (342 lb 2.5 oz)   06/16/23 1053 (!) 155 kg (342 lb 3.2 oz)   06/15/23 0505 (!) 149 kg (328 lb 14.4 oz)   06/14/23 0455 (!) 149 kg (328 lb 4.8 oz)   06/13/23  1703 (!) 149 kg (328 lb 11.2 oz)   06/13/23 0600 (!) 170 kg (374 lb 12.5 oz)   06/12/23 0420 (!) 160 kg (352 lb 11.8 oz)   06/11/23 0447 (!) 150 kg (331 lb 5.6 oz)   06/10/23 0500 (!) 150 kg (330 lb 14.6 oz)   06/09/23 0536 (!) 156 kg (343 lb 7.6 oz)   06/08/23 1826 (!) 156 kg (344 lb 11.2 oz)   06/08/23 0522 (!) 158 kg (348 lb 8.8 oz)   06/07/23 0548 (!) 155 kg (342 lb 9.5 oz)   06/06/23 0515 (!) 155 kg (341 lb 14.9 oz)   06/05/23 0445 (!) 155 kg (341 lb 9.6 oz)   06/05/23 0348 (!) 158 kg (349 lb 3.3 oz)   06/04/23 0555 (!) 157 kg (346 lb 3.2 oz)   06/03/23 0539 (!) 158 kg (349 lb)   06/02/23 0548 (!) 163 kg (359 lb 3.2 oz)   06/01/23 0600 (!) 164 kg (362 lb)   05/31/23 0507 (!) 164 kg (362 lb 4.8 oz)   05/30/23 0635 (!) 164 kg (362 lb 7 oz)   05/29/23 0500 (!) 167 kg (368 lb 2.7 oz)   05/28/23 0600 (!) 167 kg (367 lb 11.6 oz)   05/27/23 0600 (!) 167 kg (369 lb 0.8 oz)   05/26/23 0529 (!) 167 kg (369 lb 3.2 oz)   05/25/23 0600 (!) 168 kg (370 lb 3.2 oz)   05/24/23 0600 (!) 166 kg (366 lb 9.6 oz)   05/22/23 0529 (!) 169 kg (373 lb 7.4 oz)   05/21/23 0600 (!) 169 kg (371 lb 12.8 oz)   05/20/23 0600 (!) 171 kg (376 lb 5.2 oz)   05/19/23 0300 (!) 168 kg (370 lb 14.4 oz)   05/18/23 1912 (!) 168 kg (371 lb 7.6 oz)   05/18/23 0600 (!) 169 kg (371 lb 11.1 oz)   05/16/23 0700 (!) 171 kg (377 lb 4.8 oz)   05/14/23 0500 (!) 171 kg (377 lb 3.3 oz)   05/12/23 1143 (!) 170 kg (375 lb)   05/06/23 0258 (!) 170 kg (375 lb 8 oz)   04/19/23 0909 (!) 163 kg (359 lb)   04/03/23 1906 (!) 168 kg (370 lb)   03/27/23 0938 (!) 170 kg (373 lb 10.9 oz)   03/17/23 1153 (!) 168 kg (370 lb)   01/27/23 1501 (!) 168 kg (370 lb)   12/22/22 1501 (!) 171 kg (376 lb)   11/08/22 1035 (!) 161 kg (355 lb)   10/01/22 1141 (!) 164 kg (360 lb 10.8 oz)   05/18/22 1311 (!) 155 kg (341 lb)   03/24/22 1432 (!) 155 kg (341 lb)   03/02/22 1412 (!) 155 kg (341 lb)   01/12/22 1317 (!) 155 kg (341 lb)   12/30/21 1431 (!) 155 kg (341 lb)   12/01/21 1144 (!) 155 kg  "(341 lb 11.4 oz)   12/01/21 0843 (!) 157 kg (346 lb)   11/22/21 0839 (!) 157 kg (346 lb)   11/15/21 1148 (!) 157 kg (346 lb 12.5 oz)   11/09/21 1139 (!) 157 kg (345 lb 7.4 oz)   11/05/21 1130 (!) 159 kg (351 lb 3.1 oz)   11/02/21 1121 (!) 161 kg (356 lb)   10/27/21 1048 (!) 161 kg (356 lb)   10/21/21 1418 (!) 162 kg (356 lb 14.8 oz)          Wt Change Observation Wt loss on Ozempic     Estimated/Assessed Needs  Estimated Needs based on: Adjusted Body Weight       Energy Requirements 25 kcal/kg    EST Needs (kcal/day) 2600       Protein Requirements 1.0-1.2 g/kg   EST Daily Needs (g/day) 104-125       Fluid Requirements 25 ml/kg    Estimated Needs (mL/day) 2600     Labs/Medications         Pertinent Labs Reviewed.         Invalid input(s): \"LABALBU\", \"PROT\"            COVID19   Date Value Ref Range Status   03/11/2024 Not Detected Not Detected - Ref. Range Final     Lab Results   Component Value Date    HGBA1C 5.00 05/06/2024         Pertinent Medications Reviewed.     Malnutrition Severity Assessment              Nutrition Diagnosis         Nutrition Dx Problem 1 Increased nutrient needs related to increased protein demand as evidenced by impaired skin integrity.     Nutrition Intervention           Current Nutrition Orders & Evaluation of Intake       Current PO Diet Diet: Diabetic Diets, High Protein Diet, Cardiac; Low Sodium (2g); Texture: Regular Texture (IDDSI 7); Fluid Consistency: Thin (IDDSI 0)   Supplement Orders Placed This Encounter      DIET MESSAGE Pt is allowed 2 plant-based burgers per day Tiff Orellana RD           Nutrition Intervention/Prescription        High protein diet   Plant-based burger okay. 2 per day = 560 mg Na+        Medical Nutrition Therapy/Nutrition Education          Learner     Readiness Patient  N/A     Method     Response N/A  N/A     Monitor/Evaluation        Monitor PO intake, Weight, Skin status     Nutrition Discharge Plan         Continue high protein diet upon discharge  "     Electronically signed by:  Brigitte Mcclellan RD  08/09/24 13:24 EDT

## 2024-08-09 NOTE — PLAN OF CARE
Goal Outcome Evaluation:  Plan of Care Reviewed With: patient        Progress: no change  Outcome Evaluation: AAOx4, VSS, remains on SSI with insulin given as ordered, last BM noted this shift, declines skin care, intermittent with care allowed, continues to ask for PRN pain meds and baclofen frequently, given as ordered, eye ointment d/c'd due to no s/s of conjunctivitis, tolerating diet, refuses to get OOB to BSC or chair, refuses bed alarm, continue with current POC at this time.

## 2024-08-10 LAB
GLUCOSE BLDC GLUCOMTR-MCNC: 102 MG/DL (ref 70–99)
GLUCOSE BLDC GLUCOMTR-MCNC: 105 MG/DL (ref 70–99)
GLUCOSE BLDC GLUCOMTR-MCNC: 127 MG/DL (ref 70–99)
GLUCOSE BLDC GLUCOMTR-MCNC: 92 MG/DL (ref 70–99)

## 2024-08-10 PROCEDURE — 63710000001 INSULIN GLARGINE PER 5 UNITS: Performed by: PHYSICIAN ASSISTANT

## 2024-08-10 PROCEDURE — 82948 REAGENT STRIP/BLOOD GLUCOSE: CPT

## 2024-08-10 RX ORDER — DIPHENOXYLATE HCL/ATROPINE 2.5-.025MG
1 TABLET ORAL 4 TIMES DAILY PRN
Status: DISCONTINUED | OUTPATIENT
Start: 2024-08-10 | End: 2024-08-16

## 2024-08-10 RX ORDER — OXYCODONE AND ACETAMINOPHEN 7.5; 325 MG/1; MG/1
1 TABLET ORAL EVERY 6 HOURS PRN
Status: DISPENSED | OUTPATIENT
Start: 2024-08-10 | End: 2024-08-29

## 2024-08-10 RX ADMIN — OXYCODONE HYDROCHLORIDE AND ACETAMINOPHEN 1 TABLET: 7.5; 325 TABLET ORAL at 08:17

## 2024-08-10 RX ADMIN — CETIRIZINE HYDROCHLORIDE 10 MG: 10 TABLET, FILM COATED ORAL at 08:17

## 2024-08-10 RX ADMIN — ATORVASTATIN CALCIUM 10 MG: 10 TABLET, FILM COATED ORAL at 21:31

## 2024-08-10 RX ADMIN — DICLOFENAC SODIUM 4 G: 10 GEL TOPICAL at 02:23

## 2024-08-10 RX ADMIN — APIXABAN 5 MG: 5 TABLET, FILM COATED ORAL at 08:18

## 2024-08-10 RX ADMIN — FERROUS SULFATE TAB 325 MG (65 MG ELEMENTAL FE) 325 MG: 325 (65 FE) TAB at 08:18

## 2024-08-10 RX ADMIN — Medication 10 MG: at 21:31

## 2024-08-10 RX ADMIN — INSULIN GLARGINE 22 UNITS: 100 INJECTION, SOLUTION SUBCUTANEOUS at 21:29

## 2024-08-10 RX ADMIN — IBUPROFEN 400 MG: 400 TABLET ORAL at 21:31

## 2024-08-10 RX ADMIN — BACLOFEN 10 MG: 10 TABLET ORAL at 16:17

## 2024-08-10 RX ADMIN — APIXABAN 5 MG: 5 TABLET, FILM COATED ORAL at 21:31

## 2024-08-10 RX ADMIN — DIPHENOXYLATE HYDROCHLORIDE AND ATROPINE SULFATE 1 TABLET: 2.5; .025 TABLET ORAL at 16:22

## 2024-08-10 RX ADMIN — DICLOFENAC SODIUM 4 G: 10 GEL TOPICAL at 15:00

## 2024-08-10 RX ADMIN — PREGABALIN 100 MG: 100 CAPSULE ORAL at 15:15

## 2024-08-10 RX ADMIN — OXYCODONE HYDROCHLORIDE AND ACETAMINOPHEN 1 TABLET: 7.5; 325 TABLET ORAL at 00:06

## 2024-08-10 RX ADMIN — PREGABALIN 100 MG: 100 CAPSULE ORAL at 21:31

## 2024-08-10 RX ADMIN — SERTRALINE HYDROCHLORIDE 50 MG: 50 TABLET ORAL at 21:31

## 2024-08-10 RX ADMIN — DIPHENOXYLATE HYDROCHLORIDE AND ATROPINE SULFATE 1 TABLET: 2.5; .025 TABLET ORAL at 23:29

## 2024-08-10 RX ADMIN — BISMUTH SUBSALICYLATE 524 MG: 262 TABLET, CHEWABLE ORAL at 12:31

## 2024-08-10 RX ADMIN — BACLOFEN 10 MG: 10 TABLET ORAL at 08:17

## 2024-08-10 RX ADMIN — LISINOPRIL 2.5 MG: 2.5 TABLET ORAL at 08:18

## 2024-08-10 RX ADMIN — FUROSEMIDE 40 MG: 40 TABLET ORAL at 18:02

## 2024-08-10 RX ADMIN — OXYCODONE HYDROCHLORIDE AND ACETAMINOPHEN 1 TABLET: 7.5; 325 TABLET ORAL at 16:17

## 2024-08-10 RX ADMIN — INSULIN GLARGINE 22 UNITS: 100 INJECTION, SOLUTION SUBCUTANEOUS at 08:19

## 2024-08-10 RX ADMIN — IBUPROFEN 400 MG: 400 TABLET ORAL at 04:21

## 2024-08-10 RX ADMIN — FUROSEMIDE 40 MG: 40 TABLET ORAL at 08:17

## 2024-08-10 RX ADMIN — DICLOFENAC SODIUM 4 G: 10 GEL TOPICAL at 21:28

## 2024-08-10 RX ADMIN — MONTELUKAST 10 MG: 10 TABLET, FILM COATED ORAL at 21:31

## 2024-08-10 RX ADMIN — EMPAGLIFLOZIN 10 MG: 10 TABLET, FILM COATED ORAL at 08:18

## 2024-08-10 RX ADMIN — PREGABALIN 100 MG: 100 CAPSULE ORAL at 08:18

## 2024-08-10 RX ADMIN — CYANOCOBALAMIN TAB 500 MCG 1000 MCG: 500 TAB at 08:18

## 2024-08-10 RX ADMIN — DICLOFENAC SODIUM 4 G: 10 GEL TOPICAL at 10:10

## 2024-08-10 NOTE — PLAN OF CARE
Goal Outcome Evaluation:  Plan of Care Reviewed With: patient        Progress: no change  Outcome Evaluation: AAOX4, no SSI required this shift, multiple BM'S, pain meds given per order. Call light and personal items within reach at bedside ,no changes in status cont. POC                                3

## 2024-08-10 NOTE — PLAN OF CARE
Goal Outcome Evaluation:  Plan of Care Reviewed With: patient        Progress: no change  Outcome Evaluation: Patient is alert and oriented x4. C/O severe left hip pain at 0817 and again at 1617 and was given PRN Baclofen and Percocet. Meds effective each occurence. Has had multiple bouts of loose stools this shift. Given PRN Pepto Bismol at 1231. Med not effective per patient. Provider contacted and new order recieved for Lomotil which was given at 1612. Med has been somewhat effective per patient. Blue top barrier cream applied to yonis-area after each BM. Patient voices needs. Con't current POC.

## 2024-08-11 LAB
GLUCOSE BLDC GLUCOMTR-MCNC: 114 MG/DL (ref 70–99)
GLUCOSE BLDC GLUCOMTR-MCNC: 159 MG/DL (ref 70–99)
GLUCOSE BLDC GLUCOMTR-MCNC: 86 MG/DL (ref 70–99)
GLUCOSE BLDC GLUCOMTR-MCNC: 98 MG/DL (ref 70–99)

## 2024-08-11 PROCEDURE — 63710000001 INSULIN GLARGINE PER 5 UNITS: Performed by: PHYSICIAN ASSISTANT

## 2024-08-11 PROCEDURE — 82948 REAGENT STRIP/BLOOD GLUCOSE: CPT

## 2024-08-11 PROCEDURE — 63710000001 INSULIN LISPRO (HUMAN) PER 5 UNITS: Performed by: PHYSICIAN ASSISTANT

## 2024-08-11 RX ADMIN — DICLOFENAC SODIUM 4 G: 10 GEL TOPICAL at 08:20

## 2024-08-11 RX ADMIN — FUROSEMIDE 40 MG: 40 TABLET ORAL at 17:49

## 2024-08-11 RX ADMIN — Medication 10 MG: at 20:46

## 2024-08-11 RX ADMIN — PREGABALIN 100 MG: 100 CAPSULE ORAL at 20:47

## 2024-08-11 RX ADMIN — FERROUS SULFATE TAB 325 MG (65 MG ELEMENTAL FE) 325 MG: 325 (65 FE) TAB at 08:05

## 2024-08-11 RX ADMIN — BACLOFEN 10 MG: 10 TABLET ORAL at 16:12

## 2024-08-11 RX ADMIN — OXYCODONE HYDROCHLORIDE AND ACETAMINOPHEN 1 TABLET: 7.5; 325 TABLET ORAL at 16:12

## 2024-08-11 RX ADMIN — FLUTICASONE PROPIONATE 2 SPRAY: 50 SPRAY, METERED NASAL at 10:11

## 2024-08-11 RX ADMIN — PREGABALIN 100 MG: 100 CAPSULE ORAL at 10:10

## 2024-08-11 RX ADMIN — MONTELUKAST 10 MG: 10 TABLET, FILM COATED ORAL at 20:47

## 2024-08-11 RX ADMIN — ATORVASTATIN CALCIUM 10 MG: 10 TABLET, FILM COATED ORAL at 20:46

## 2024-08-11 RX ADMIN — ACETAMINOPHEN 650 MG: 325 TABLET ORAL at 10:42

## 2024-08-11 RX ADMIN — APIXABAN 5 MG: 5 TABLET, FILM COATED ORAL at 10:10

## 2024-08-11 RX ADMIN — OXYCODONE HYDROCHLORIDE AND ACETAMINOPHEN 1 TABLET: 7.5; 325 TABLET ORAL at 08:06

## 2024-08-11 RX ADMIN — APIXABAN 5 MG: 5 TABLET, FILM COATED ORAL at 20:47

## 2024-08-11 RX ADMIN — CYANOCOBALAMIN TAB 500 MCG 1000 MCG: 500 TAB at 10:10

## 2024-08-11 RX ADMIN — SERTRALINE HYDROCHLORIDE 50 MG: 50 TABLET ORAL at 20:47

## 2024-08-11 RX ADMIN — INSULIN LISPRO 3 UNITS: 100 INJECTION, SOLUTION INTRAVENOUS; SUBCUTANEOUS at 20:45

## 2024-08-11 RX ADMIN — BACLOFEN 10 MG: 10 TABLET ORAL at 08:06

## 2024-08-11 RX ADMIN — Medication 1 APPLICATION: at 15:39

## 2024-08-11 RX ADMIN — BISMUTH SUBSALICYLATE 524 MG: 262 TABLET, CHEWABLE ORAL at 12:04

## 2024-08-11 RX ADMIN — PREGABALIN 100 MG: 100 CAPSULE ORAL at 16:12

## 2024-08-11 RX ADMIN — CETIRIZINE HYDROCHLORIDE 10 MG: 10 TABLET, FILM COATED ORAL at 10:10

## 2024-08-11 RX ADMIN — DIPHENOXYLATE HYDROCHLORIDE AND ATROPINE SULFATE 1 TABLET: 2.5; .025 TABLET ORAL at 16:12

## 2024-08-11 RX ADMIN — DICLOFENAC SODIUM 4 G: 10 GEL TOPICAL at 20:48

## 2024-08-11 RX ADMIN — LISINOPRIL 2.5 MG: 2.5 TABLET ORAL at 10:09

## 2024-08-11 RX ADMIN — OXYCODONE HYDROCHLORIDE AND ACETAMINOPHEN 1 TABLET: 7.5; 325 TABLET ORAL at 00:28

## 2024-08-11 RX ADMIN — BACLOFEN 10 MG: 10 TABLET ORAL at 00:28

## 2024-08-11 RX ADMIN — INSULIN GLARGINE 22 UNITS: 100 INJECTION, SOLUTION SUBCUTANEOUS at 20:45

## 2024-08-11 RX ADMIN — EMPAGLIFLOZIN 10 MG: 10 TABLET, FILM COATED ORAL at 10:09

## 2024-08-11 RX ADMIN — DICLOFENAC SODIUM 4 G: 10 GEL TOPICAL at 15:38

## 2024-08-11 RX ADMIN — FUROSEMIDE 40 MG: 40 TABLET ORAL at 10:09

## 2024-08-11 RX ADMIN — INSULIN GLARGINE 22 UNITS: 100 INJECTION, SOLUTION SUBCUTANEOUS at 10:08

## 2024-08-11 NOTE — PLAN OF CARE
Goal Outcome Evaluation:  Plan of Care Reviewed With: patient        Progress: no change  Outcome Evaluation: Patient alert and oriented x4 able to make needs known. Pain in left hip this shift. Medication with Perocet/baclofen x2 this shift. Tylenol given x1. Skin care completed as ordered. Diarrhea continues lomotil and pepto given this shift. Continue current POC.

## 2024-08-11 NOTE — PLAN OF CARE
Goal Outcome Evaluation:  Plan of Care Reviewed With: patient        Progress: no change  Outcome Evaluation: Rsd AOX4, continues to complain of severe hip pain made somewhat tolerable with drugs but does not relieve per pt. baclofen and percocet given approx. q8 hrs. last dose @0028 Ibuprofen and Tylenol given inbetween. pt cont to have diarrhea lomotil given@2325.Pt makes needs known to staff. call light and personal items within reach at bedside.

## 2024-08-12 LAB
GLUCOSE BLDC GLUCOMTR-MCNC: 122 MG/DL (ref 70–99)
GLUCOSE BLDC GLUCOMTR-MCNC: 95 MG/DL (ref 70–99)
GLUCOSE BLDC GLUCOMTR-MCNC: 97 MG/DL (ref 70–99)
GLUCOSE BLDC GLUCOMTR-MCNC: 97 MG/DL (ref 70–99)

## 2024-08-12 PROCEDURE — 63710000001 ONDANSETRON ODT 4 MG TABLET DISPERSIBLE: Performed by: PHYSICIAN ASSISTANT

## 2024-08-12 PROCEDURE — 82948 REAGENT STRIP/BLOOD GLUCOSE: CPT

## 2024-08-12 PROCEDURE — 63710000001 INSULIN GLARGINE PER 5 UNITS: Performed by: PHYSICIAN ASSISTANT

## 2024-08-12 RX ADMIN — PREGABALIN 100 MG: 100 CAPSULE ORAL at 21:00

## 2024-08-12 RX ADMIN — INSULIN GLARGINE 22 UNITS: 100 INJECTION, SOLUTION SUBCUTANEOUS at 21:00

## 2024-08-12 RX ADMIN — EMPAGLIFLOZIN 10 MG: 10 TABLET, FILM COATED ORAL at 08:19

## 2024-08-12 RX ADMIN — DIPHENOXYLATE HYDROCHLORIDE AND ATROPINE SULFATE 1 TABLET: 2.5; .025 TABLET ORAL at 18:03

## 2024-08-12 RX ADMIN — APIXABAN 5 MG: 5 TABLET, FILM COATED ORAL at 08:20

## 2024-08-12 RX ADMIN — PREGABALIN 100 MG: 100 CAPSULE ORAL at 08:19

## 2024-08-12 RX ADMIN — FUROSEMIDE 40 MG: 40 TABLET ORAL at 18:03

## 2024-08-12 RX ADMIN — OXYCODONE HYDROCHLORIDE AND ACETAMINOPHEN 1 TABLET: 7.5; 325 TABLET ORAL at 00:17

## 2024-08-12 RX ADMIN — DICLOFENAC SODIUM 4 G: 10 GEL TOPICAL at 07:17

## 2024-08-12 RX ADMIN — ATORVASTATIN CALCIUM 10 MG: 10 TABLET, FILM COATED ORAL at 21:00

## 2024-08-12 RX ADMIN — OXYCODONE HYDROCHLORIDE AND ACETAMINOPHEN 1 TABLET: 7.5; 325 TABLET ORAL at 08:20

## 2024-08-12 RX ADMIN — ONDANSETRON 4 MG: 4 TABLET, ORALLY DISINTEGRATING ORAL at 18:04

## 2024-08-12 RX ADMIN — PREGABALIN 100 MG: 100 CAPSULE ORAL at 18:04

## 2024-08-12 RX ADMIN — DICLOFENAC SODIUM 4 G: 10 GEL TOPICAL at 01:06

## 2024-08-12 RX ADMIN — LISINOPRIL 2.5 MG: 2.5 TABLET ORAL at 08:20

## 2024-08-12 RX ADMIN — CYANOCOBALAMIN TAB 500 MCG 1000 MCG: 500 TAB at 08:19

## 2024-08-12 RX ADMIN — Medication 10 MG: at 21:00

## 2024-08-12 RX ADMIN — DIPHENOXYLATE HYDROCHLORIDE AND ATROPINE SULFATE 1 TABLET: 2.5; .025 TABLET ORAL at 01:06

## 2024-08-12 RX ADMIN — FERROUS SULFATE TAB 325 MG (65 MG ELEMENTAL FE) 325 MG: 325 (65 FE) TAB at 08:19

## 2024-08-12 RX ADMIN — CETIRIZINE HYDROCHLORIDE 10 MG: 10 TABLET, FILM COATED ORAL at 08:20

## 2024-08-12 RX ADMIN — FUROSEMIDE 40 MG: 40 TABLET ORAL at 08:20

## 2024-08-12 RX ADMIN — BACLOFEN 10 MG: 10 TABLET ORAL at 00:17

## 2024-08-12 RX ADMIN — DICLOFENAC SODIUM 4 G: 10 GEL TOPICAL at 20:59

## 2024-08-12 RX ADMIN — BACLOFEN 10 MG: 10 TABLET ORAL at 18:03

## 2024-08-12 RX ADMIN — BISMUTH SUBSALICYLATE 524 MG: 262 TABLET, CHEWABLE ORAL at 22:43

## 2024-08-12 RX ADMIN — APIXABAN 5 MG: 5 TABLET, FILM COATED ORAL at 21:00

## 2024-08-12 RX ADMIN — MONTELUKAST 10 MG: 10 TABLET, FILM COATED ORAL at 21:00

## 2024-08-12 RX ADMIN — IBUPROFEN 400 MG: 400 TABLET ORAL at 05:50

## 2024-08-12 RX ADMIN — DIPHENOXYLATE HYDROCHLORIDE AND ATROPINE SULFATE 1 TABLET: 2.5; .025 TABLET ORAL at 11:11

## 2024-08-12 RX ADMIN — BACLOFEN 10 MG: 10 TABLET ORAL at 08:19

## 2024-08-12 RX ADMIN — BISMUTH SUBSALICYLATE 524 MG: 262 TABLET, CHEWABLE ORAL at 14:36

## 2024-08-12 RX ADMIN — DICLOFENAC SODIUM 4 G: 10 GEL TOPICAL at 14:38

## 2024-08-12 RX ADMIN — OXYCODONE HYDROCHLORIDE AND ACETAMINOPHEN 1 TABLET: 7.5; 325 TABLET ORAL at 18:03

## 2024-08-12 RX ADMIN — SERTRALINE HYDROCHLORIDE 50 MG: 50 TABLET ORAL at 21:00

## 2024-08-12 RX ADMIN — INSULIN GLARGINE 22 UNITS: 100 INJECTION, SOLUTION SUBCUTANEOUS at 08:20

## 2024-08-12 NOTE — NURSING NOTE
Met with patient at bedside. Provided further education on purpose of living will. Patient maintains that he does not have anyone to name as HCS. Reviewed living will with patient and agreeable to complete living will without naming a HCS but document his wishes regarding life prolonging treatment/artificial nutrition. Allowed space for patient to reflect on life- emotional support provided. Original living will given to patient, copy placed in chart. At this time, no further palliative care needs identified- palliative care will sign off. If new needs arise please place new palliative care consult order.    Delfina LORENZO, RN, PN  Palliative Care

## 2024-08-12 NOTE — PLAN OF CARE
Goal Outcome Evaluation:  Plan of Care Reviewed With: patient        Progress: no change  Outcome Evaluation: Patient is alert and oriented x4. Patient has had multiple loose watery stools this shift. Given PRN Lomotil at 1111 and again at 1803. Given Pepto Bismol at 1436. Given Zofran ODT at 1804 for stomach upset. Provider aware. This nurse talked to wound nurse and WCON recommended alternating calazyme and blue top barrier cream for moist excoriated skin to anal area/buttocks from loose stools.Given PRN Baclofen and Percocet at 0819 and again at 1803 for severe left hip pain. Med effective. Voices needs. Con't current POC.

## 2024-08-12 NOTE — PLAN OF CARE
Goal Outcome Evaluation:  Plan of Care Reviewed With: patient        Progress: no change  Outcome Evaluation: Alert and oriented; no significant changes this shift. Medicated with Baclofen and Perocet for left hip pain with some relief. Lomotil given for diarrhea x1. VSS. Blood sugar 159 at bedtime; covered with insulin per MAR. Call light within reach; care plan ongoing.

## 2024-08-13 LAB
GLUCOSE BLDC GLUCOMTR-MCNC: 148 MG/DL (ref 70–99)
GLUCOSE BLDC GLUCOMTR-MCNC: 150 MG/DL (ref 70–99)
GLUCOSE BLDC GLUCOMTR-MCNC: 151 MG/DL (ref 70–99)
GLUCOSE BLDC GLUCOMTR-MCNC: 187 MG/DL (ref 70–99)

## 2024-08-13 PROCEDURE — 63710000001 INSULIN LISPRO (HUMAN) PER 5 UNITS: Performed by: PHYSICIAN ASSISTANT

## 2024-08-13 PROCEDURE — 82948 REAGENT STRIP/BLOOD GLUCOSE: CPT

## 2024-08-13 PROCEDURE — 63710000001 INSULIN GLARGINE PER 5 UNITS: Performed by: PHYSICIAN ASSISTANT

## 2024-08-13 RX ADMIN — OXYCODONE HYDROCHLORIDE AND ACETAMINOPHEN 1 TABLET: 7.5; 325 TABLET ORAL at 19:30

## 2024-08-13 RX ADMIN — IBUPROFEN 400 MG: 400 TABLET ORAL at 17:52

## 2024-08-13 RX ADMIN — LISINOPRIL 2.5 MG: 2.5 TABLET ORAL at 08:45

## 2024-08-13 RX ADMIN — CYANOCOBALAMIN TAB 500 MCG 1000 MCG: 500 TAB at 08:45

## 2024-08-13 RX ADMIN — EMPAGLIFLOZIN 10 MG: 10 TABLET, FILM COATED ORAL at 08:46

## 2024-08-13 RX ADMIN — APIXABAN 5 MG: 5 TABLET, FILM COATED ORAL at 08:46

## 2024-08-13 RX ADMIN — PREGABALIN 100 MG: 100 CAPSULE ORAL at 08:46

## 2024-08-13 RX ADMIN — DICLOFENAC SODIUM 4 G: 10 GEL TOPICAL at 08:47

## 2024-08-13 RX ADMIN — DICLOFENAC SODIUM 4 G: 10 GEL TOPICAL at 20:54

## 2024-08-13 RX ADMIN — FUROSEMIDE 40 MG: 40 TABLET ORAL at 17:52

## 2024-08-13 RX ADMIN — INSULIN LISPRO 3 UNITS: 100 INJECTION, SOLUTION INTRAVENOUS; SUBCUTANEOUS at 17:51

## 2024-08-13 RX ADMIN — BISMUTH SUBSALICYLATE 524 MG: 262 TABLET, CHEWABLE ORAL at 19:42

## 2024-08-13 RX ADMIN — FUROSEMIDE 40 MG: 40 TABLET ORAL at 08:46

## 2024-08-13 RX ADMIN — OXYCODONE HYDROCHLORIDE AND ACETAMINOPHEN 1 TABLET: 7.5; 325 TABLET ORAL at 02:20

## 2024-08-13 RX ADMIN — SERTRALINE HYDROCHLORIDE 50 MG: 50 TABLET ORAL at 20:52

## 2024-08-13 RX ADMIN — OXYCODONE HYDROCHLORIDE AND ACETAMINOPHEN 1 TABLET: 7.5; 325 TABLET ORAL at 08:46

## 2024-08-13 RX ADMIN — ATORVASTATIN CALCIUM 10 MG: 10 TABLET, FILM COATED ORAL at 20:52

## 2024-08-13 RX ADMIN — INSULIN GLARGINE 22 UNITS: 100 INJECTION, SOLUTION SUBCUTANEOUS at 20:53

## 2024-08-13 RX ADMIN — DICLOFENAC SODIUM 4 G: 10 GEL TOPICAL at 01:13

## 2024-08-13 RX ADMIN — IBUPROFEN 400 MG: 400 TABLET ORAL at 06:40

## 2024-08-13 RX ADMIN — MONTELUKAST 10 MG: 10 TABLET, FILM COATED ORAL at 20:52

## 2024-08-13 RX ADMIN — BACLOFEN 10 MG: 10 TABLET ORAL at 19:30

## 2024-08-13 RX ADMIN — BACLOFEN 10 MG: 10 TABLET ORAL at 11:26

## 2024-08-13 RX ADMIN — BACLOFEN 10 MG: 10 TABLET ORAL at 02:19

## 2024-08-13 RX ADMIN — PREGABALIN 100 MG: 100 CAPSULE ORAL at 20:52

## 2024-08-13 RX ADMIN — INSULIN GLARGINE 22 UNITS: 100 INJECTION, SOLUTION SUBCUTANEOUS at 08:47

## 2024-08-13 RX ADMIN — CETIRIZINE HYDROCHLORIDE 10 MG: 10 TABLET, FILM COATED ORAL at 08:46

## 2024-08-13 RX ADMIN — ACETAMINOPHEN 650 MG: 325 TABLET ORAL at 15:48

## 2024-08-13 RX ADMIN — DIPHENOXYLATE HYDROCHLORIDE AND ATROPINE SULFATE 1 TABLET: 2.5; .025 TABLET ORAL at 17:52

## 2024-08-13 RX ADMIN — FERROUS SULFATE TAB 325 MG (65 MG ELEMENTAL FE) 325 MG: 325 (65 FE) TAB at 08:46

## 2024-08-13 RX ADMIN — APIXABAN 5 MG: 5 TABLET, FILM COATED ORAL at 20:52

## 2024-08-13 RX ADMIN — PREGABALIN 100 MG: 100 CAPSULE ORAL at 15:48

## 2024-08-13 RX ADMIN — Medication 10 MG: at 20:52

## 2024-08-13 RX ADMIN — INSULIN LISPRO 3 UNITS: 100 INJECTION, SOLUTION INTRAVENOUS; SUBCUTANEOUS at 20:53

## 2024-08-13 RX ADMIN — INSULIN LISPRO 3 UNITS: 100 INJECTION, SOLUTION INTRAVENOUS; SUBCUTANEOUS at 08:46

## 2024-08-13 RX ADMIN — DIPHENOXYLATE HYDROCHLORIDE AND ATROPINE SULFATE 1 TABLET: 2.5; .025 TABLET ORAL at 01:13

## 2024-08-13 NOTE — PLAN OF CARE
Goal Outcome Evaluation:  Plan of Care Reviewed With: patient        Progress: no change  Outcome Evaluation: AAOx4, VSS, remains on SSI with insulin given as ordered, no c/o of SOA, N/V/D, stools are loose, intermittently incontinent, declines to get OOB to use BSC or sit in chair, c/o of chronic (L) hip and (L) shoulder pain, declines skin care, PRN Percocet, Baclofen, Tylenol given for chronic pain, sleeps in between care, remains bedbound in a bariatric bed uses overhead trapeze to assist with rolling to get on bedpan, bed in low/locked position, refuses alarms, call light and personal items within reach, continue with current POC at this time.    PT continues to door dash and be non-compliant with diet, continued education given to patient from MD and nursing staff with no avail.

## 2024-08-13 NOTE — PLAN OF CARE
Goal Outcome Evaluation:  Plan of Care Reviewed With: patient        Progress: no change  Outcome Evaluation: Alert and oriented x4; able to make needs known. Remains bedbound but assists with turns to be cleaned. Uses bedpan and urinal with episodes of incontinence of bowel and bladder. Multiple liquid BM's this shift; received Pepto x1 and Lomotil x1. Continue to use Calazime and blue top barrier cream to buttock and perineum. Baclofen and Percocet given for left hip pain x1. Call light within reach; care plan ongoing.

## 2024-08-14 LAB
ALBUMIN SERPL-MCNC: 2.7 G/DL (ref 3.5–5.2)
ALBUMIN/GLOB SERPL: 1.1 G/DL
ALP SERPL-CCNC: 143 U/L (ref 39–117)
ALT SERPL W P-5'-P-CCNC: 15 U/L (ref 1–41)
ANION GAP SERPL CALCULATED.3IONS-SCNC: 5.2 MMOL/L (ref 5–15)
AST SERPL-CCNC: 26 U/L (ref 1–40)
BASOPHILS # BLD AUTO: 0.04 10*3/MM3 (ref 0–0.2)
BASOPHILS NFR BLD AUTO: 0.9 % (ref 0–1.5)
BILIRUB SERPL-MCNC: 0.4 MG/DL (ref 0–1.2)
BUN SERPL-MCNC: 20 MG/DL (ref 8–23)
BUN/CREAT SERPL: 35.7 (ref 7–25)
CALCIUM SPEC-SCNC: 7.8 MG/DL (ref 8.6–10.5)
CHLORIDE SERPL-SCNC: 105 MMOL/L (ref 98–107)
CO2 SERPL-SCNC: 26.8 MMOL/L (ref 22–29)
CREAT SERPL-MCNC: 0.56 MG/DL (ref 0.76–1.27)
DEPRECATED RDW RBC AUTO: 47.9 FL (ref 37–54)
EGFRCR SERPLBLD CKD-EPI 2021: 109.4 ML/MIN/1.73
EOSINOPHIL # BLD AUTO: 0.12 10*3/MM3 (ref 0–0.4)
EOSINOPHIL NFR BLD AUTO: 2.7 % (ref 0.3–6.2)
ERYTHROCYTE [DISTWIDTH] IN BLOOD BY AUTOMATED COUNT: 15 % (ref 12.3–15.4)
GLOBULIN UR ELPH-MCNC: 2.4 GM/DL
GLUCOSE BLDC GLUCOMTR-MCNC: 105 MG/DL (ref 70–99)
GLUCOSE BLDC GLUCOMTR-MCNC: 108 MG/DL (ref 70–99)
GLUCOSE BLDC GLUCOMTR-MCNC: 117 MG/DL (ref 70–99)
GLUCOSE BLDC GLUCOMTR-MCNC: 145 MG/DL (ref 70–99)
GLUCOSE SERPL-MCNC: 108 MG/DL (ref 65–99)
HCT VFR BLD AUTO: 33.1 % (ref 37.5–51)
HGB BLD-MCNC: 10.5 G/DL (ref 13–17.7)
IMM GRANULOCYTES # BLD AUTO: 0.01 10*3/MM3 (ref 0–0.05)
IMM GRANULOCYTES NFR BLD AUTO: 0.2 % (ref 0–0.5)
LYMPHOCYTES # BLD AUTO: 1.2 10*3/MM3 (ref 0.7–3.1)
LYMPHOCYTES NFR BLD AUTO: 27.4 % (ref 19.6–45.3)
MAGNESIUM SERPL-MCNC: 1.9 MG/DL (ref 1.6–2.4)
MCH RBC QN AUTO: 27.6 PG (ref 26.6–33)
MCHC RBC AUTO-ENTMCNC: 31.7 G/DL (ref 31.5–35.7)
MCV RBC AUTO: 87.1 FL (ref 79–97)
MONOCYTES # BLD AUTO: 0.64 10*3/MM3 (ref 0.1–0.9)
MONOCYTES NFR BLD AUTO: 14.6 % (ref 5–12)
NEUTROPHILS NFR BLD AUTO: 2.37 10*3/MM3 (ref 1.7–7)
NEUTROPHILS NFR BLD AUTO: 54.2 % (ref 42.7–76)
NRBC BLD AUTO-RTO: 0 /100 WBC (ref 0–0.2)
PLATELET # BLD AUTO: 98 10*3/MM3 (ref 140–450)
PMV BLD AUTO: 12.5 FL (ref 6–12)
POTASSIUM SERPL-SCNC: 3.8 MMOL/L (ref 3.5–5.2)
PROT SERPL-MCNC: 5.1 G/DL (ref 6–8.5)
RBC # BLD AUTO: 3.8 10*6/MM3 (ref 4.14–5.8)
SODIUM SERPL-SCNC: 137 MMOL/L (ref 136–145)
WBC NRBC COR # BLD AUTO: 4.38 10*3/MM3 (ref 3.4–10.8)

## 2024-08-14 PROCEDURE — 80053 COMPREHEN METABOLIC PANEL: CPT | Performed by: PHYSICIAN ASSISTANT

## 2024-08-14 PROCEDURE — 63710000001 INSULIN GLARGINE PER 5 UNITS: Performed by: PHYSICIAN ASSISTANT

## 2024-08-14 PROCEDURE — 85025 COMPLETE CBC W/AUTO DIFF WBC: CPT | Performed by: PHYSICIAN ASSISTANT

## 2024-08-14 PROCEDURE — 82948 REAGENT STRIP/BLOOD GLUCOSE: CPT

## 2024-08-14 PROCEDURE — 83735 ASSAY OF MAGNESIUM: CPT | Performed by: PHYSICIAN ASSISTANT

## 2024-08-14 PROCEDURE — 63710000001 ONDANSETRON ODT 4 MG TABLET DISPERSIBLE: Performed by: PHYSICIAN ASSISTANT

## 2024-08-14 RX ADMIN — BACLOFEN 10 MG: 10 TABLET ORAL at 03:38

## 2024-08-14 RX ADMIN — INSULIN GLARGINE 22 UNITS: 100 INJECTION, SOLUTION SUBCUTANEOUS at 20:32

## 2024-08-14 RX ADMIN — FUROSEMIDE 40 MG: 40 TABLET ORAL at 10:21

## 2024-08-14 RX ADMIN — OXYCODONE HYDROCHLORIDE AND ACETAMINOPHEN 1 TABLET: 7.5; 325 TABLET ORAL at 03:38

## 2024-08-14 RX ADMIN — DICLOFENAC SODIUM 4 G: 10 GEL TOPICAL at 14:28

## 2024-08-14 RX ADMIN — ACETAMINOPHEN 650 MG: 325 TABLET ORAL at 02:33

## 2024-08-14 RX ADMIN — ATORVASTATIN CALCIUM 10 MG: 10 TABLET, FILM COATED ORAL at 20:31

## 2024-08-14 RX ADMIN — ACETAMINOPHEN 650 MG: 325 TABLET ORAL at 17:33

## 2024-08-14 RX ADMIN — BISMUTH SUBSALICYLATE 524 MG: 262 TABLET, CHEWABLE ORAL at 15:15

## 2024-08-14 RX ADMIN — APIXABAN 5 MG: 5 TABLET, FILM COATED ORAL at 10:22

## 2024-08-14 RX ADMIN — ONDANSETRON 4 MG: 4 TABLET, ORALLY DISINTEGRATING ORAL at 12:32

## 2024-08-14 RX ADMIN — APIXABAN 5 MG: 5 TABLET, FILM COATED ORAL at 20:31

## 2024-08-14 RX ADMIN — DIPHENOXYLATE HYDROCHLORIDE AND ATROPINE SULFATE 1 TABLET: 2.5; .025 TABLET ORAL at 07:52

## 2024-08-14 RX ADMIN — Medication 1 APPLICATION: at 14:30

## 2024-08-14 RX ADMIN — Medication 10 MG: at 20:31

## 2024-08-14 RX ADMIN — BACLOFEN 10 MG: 10 TABLET ORAL at 11:42

## 2024-08-14 RX ADMIN — PREGABALIN 100 MG: 100 CAPSULE ORAL at 16:50

## 2024-08-14 RX ADMIN — EMPAGLIFLOZIN 10 MG: 10 TABLET, FILM COATED ORAL at 10:21

## 2024-08-14 RX ADMIN — CYANOCOBALAMIN TAB 500 MCG 1000 MCG: 500 TAB at 10:22

## 2024-08-14 RX ADMIN — OXYCODONE HYDROCHLORIDE AND ACETAMINOPHEN 1 TABLET: 7.5; 325 TABLET ORAL at 19:53

## 2024-08-14 RX ADMIN — DICLOFENAC SODIUM 4 G: 10 GEL TOPICAL at 02:27

## 2024-08-14 RX ADMIN — BACLOFEN 10 MG: 10 TABLET ORAL at 19:53

## 2024-08-14 RX ADMIN — MONTELUKAST 10 MG: 10 TABLET, FILM COATED ORAL at 20:31

## 2024-08-14 RX ADMIN — DICLOFENAC SODIUM 4 G: 10 GEL TOPICAL at 20:34

## 2024-08-14 RX ADMIN — SERTRALINE HYDROCHLORIDE 50 MG: 50 TABLET ORAL at 20:31

## 2024-08-14 RX ADMIN — FERROUS SULFATE TAB 325 MG (65 MG ELEMENTAL FE) 325 MG: 325 (65 FE) TAB at 10:21

## 2024-08-14 RX ADMIN — LISINOPRIL 2.5 MG: 2.5 TABLET ORAL at 10:22

## 2024-08-14 RX ADMIN — OXYCODONE HYDROCHLORIDE AND ACETAMINOPHEN 1 TABLET: 7.5; 325 TABLET ORAL at 11:42

## 2024-08-14 RX ADMIN — PREGABALIN 100 MG: 100 CAPSULE ORAL at 20:31

## 2024-08-14 RX ADMIN — INSULIN GLARGINE 22 UNITS: 100 INJECTION, SOLUTION SUBCUTANEOUS at 10:20

## 2024-08-14 RX ADMIN — DICLOFENAC SODIUM 4 G: 10 GEL TOPICAL at 07:53

## 2024-08-14 RX ADMIN — PREGABALIN 100 MG: 100 CAPSULE ORAL at 10:22

## 2024-08-14 RX ADMIN — IBUPROFEN 400 MG: 400 TABLET ORAL at 07:52

## 2024-08-14 RX ADMIN — FUROSEMIDE 40 MG: 40 TABLET ORAL at 18:24

## 2024-08-14 RX ADMIN — CETIRIZINE HYDROCHLORIDE 10 MG: 10 TABLET, FILM COATED ORAL at 10:22

## 2024-08-14 NOTE — PLAN OF CARE
Goal Outcome Evaluation:           Progress: no change  Outcome Evaluation: Rsd. is alert and oriented, able to make needs known to staff.  Rsd. has rested well this shift in between care.  Medicated x1 with prn Tylenol, baclofen and oxycodone x2, see emar.  Rsd.  States medications effective.  Rsd. also recieving scheduled voltaren gel to L) shoulder and L) elbow.  Rsd. tolerating well, states effective.  Call light and personal items within reach, Rsd. reminded to use call light for assistance, verbalizes understanding.  Rsd. refuses turns.  Weight shifts with trapeze bar.  Skin care completed to Stanley. lower legs this shift.  Aveeno lotion applied.  Rsd. refuses to transfer or get out of bed this shift.  X1-2 assist to turn and place on bedpan.  Activity encouraged.  WIll continue to monitor and notify on-coming staff.  Current plan of care remains in place this shift.

## 2024-08-14 NOTE — PLAN OF CARE
Goal Outcome Evaluation:  Plan of Care Reviewed With: patient        Progress: no change  Outcome Evaluation: Patient is alert and oriented x4. C/O pain multiple times to left hip throughout shift. Was given ibuprofen at 0752, Baclofen and Percocet at 1142, and Tylenol at 1733 for pain. Meds somewhat effective each occurance. C/O nausea at 1232 and was given Zofran ODT. Med somewhat effective but c/o nausea again at 1515 and patient was given Pepto Bismol. Med effective. No emesis this shift. Voices needs. Con't current POC.

## 2024-08-15 LAB
GLUCOSE BLDC GLUCOMTR-MCNC: 107 MG/DL (ref 70–99)
GLUCOSE BLDC GLUCOMTR-MCNC: 120 MG/DL (ref 70–99)
GLUCOSE BLDC GLUCOMTR-MCNC: 147 MG/DL (ref 70–99)
GLUCOSE BLDC GLUCOMTR-MCNC: 151 MG/DL (ref 70–99)

## 2024-08-15 PROCEDURE — 63710000001 INSULIN LISPRO (HUMAN) PER 5 UNITS: Performed by: PHYSICIAN ASSISTANT

## 2024-08-15 PROCEDURE — 82948 REAGENT STRIP/BLOOD GLUCOSE: CPT

## 2024-08-15 PROCEDURE — 63710000001 INSULIN GLARGINE PER 5 UNITS: Performed by: PHYSICIAN ASSISTANT

## 2024-08-15 RX ORDER — SACCHAROMYCES BOULARDII 250 MG
250 CAPSULE ORAL 2 TIMES DAILY
Status: DISCONTINUED | OUTPATIENT
Start: 2024-08-15 | End: 2024-08-18

## 2024-08-15 RX ADMIN — DICLOFENAC SODIUM 4 G: 10 GEL TOPICAL at 13:40

## 2024-08-15 RX ADMIN — PREGABALIN 100 MG: 100 CAPSULE ORAL at 20:13

## 2024-08-15 RX ADMIN — INSULIN LISPRO 3 UNITS: 100 INJECTION, SOLUTION INTRAVENOUS; SUBCUTANEOUS at 17:15

## 2024-08-15 RX ADMIN — IBUPROFEN 400 MG: 400 TABLET ORAL at 12:04

## 2024-08-15 RX ADMIN — INSULIN GLARGINE 22 UNITS: 100 INJECTION, SOLUTION SUBCUTANEOUS at 09:39

## 2024-08-15 RX ADMIN — EMPAGLIFLOZIN 10 MG: 10 TABLET, FILM COATED ORAL at 09:39

## 2024-08-15 RX ADMIN — BACLOFEN 10 MG: 10 TABLET ORAL at 04:50

## 2024-08-15 RX ADMIN — MONTELUKAST 10 MG: 10 TABLET, FILM COATED ORAL at 20:13

## 2024-08-15 RX ADMIN — INSULIN GLARGINE 22 UNITS: 100 INJECTION, SOLUTION SUBCUTANEOUS at 20:14

## 2024-08-15 RX ADMIN — OXYCODONE HYDROCHLORIDE AND ACETAMINOPHEN 1 TABLET: 7.5; 325 TABLET ORAL at 20:13

## 2024-08-15 RX ADMIN — DICLOFENAC SODIUM 4 G: 10 GEL TOPICAL at 07:49

## 2024-08-15 RX ADMIN — ATORVASTATIN CALCIUM 10 MG: 10 TABLET, FILM COATED ORAL at 20:13

## 2024-08-15 RX ADMIN — PREGABALIN 100 MG: 100 CAPSULE ORAL at 16:26

## 2024-08-15 RX ADMIN — OXYCODONE HYDROCHLORIDE AND ACETAMINOPHEN 1 TABLET: 7.5; 325 TABLET ORAL at 04:50

## 2024-08-15 RX ADMIN — FUROSEMIDE 40 MG: 40 TABLET ORAL at 09:39

## 2024-08-15 RX ADMIN — DIPHENOXYLATE HYDROCHLORIDE AND ATROPINE SULFATE 1 TABLET: 2.5; .025 TABLET ORAL at 01:06

## 2024-08-15 RX ADMIN — PREGABALIN 100 MG: 100 CAPSULE ORAL at 09:39

## 2024-08-15 RX ADMIN — DICLOFENAC SODIUM 4 G: 10 GEL TOPICAL at 20:14

## 2024-08-15 RX ADMIN — Medication: at 20:15

## 2024-08-15 RX ADMIN — Medication 10 MG: at 20:13

## 2024-08-15 RX ADMIN — Medication 250 MG: at 20:13

## 2024-08-15 RX ADMIN — APIXABAN 5 MG: 5 TABLET, FILM COATED ORAL at 09:39

## 2024-08-15 RX ADMIN — DICLOFENAC SODIUM 4 G: 10 GEL TOPICAL at 02:53

## 2024-08-15 RX ADMIN — FUROSEMIDE 40 MG: 40 TABLET ORAL at 17:15

## 2024-08-15 RX ADMIN — IBUPROFEN 400 MG: 400 TABLET ORAL at 01:03

## 2024-08-15 RX ADMIN — SERTRALINE HYDROCHLORIDE 50 MG: 50 TABLET ORAL at 20:13

## 2024-08-15 RX ADMIN — BACLOFEN 10 MG: 10 TABLET ORAL at 21:55

## 2024-08-15 RX ADMIN — OXYCODONE HYDROCHLORIDE AND ACETAMINOPHEN 1 TABLET: 7.5; 325 TABLET ORAL at 13:39

## 2024-08-15 RX ADMIN — BACLOFEN 10 MG: 10 TABLET ORAL at 13:39

## 2024-08-15 RX ADMIN — APIXABAN 5 MG: 5 TABLET, FILM COATED ORAL at 20:13

## 2024-08-15 RX ADMIN — CETIRIZINE HYDROCHLORIDE 10 MG: 10 TABLET, FILM COATED ORAL at 09:39

## 2024-08-15 RX ADMIN — LISINOPRIL 2.5 MG: 2.5 TABLET ORAL at 09:39

## 2024-08-15 RX ADMIN — FERROUS SULFATE TAB 325 MG (65 MG ELEMENTAL FE) 325 MG: 325 (65 FE) TAB at 07:49

## 2024-08-15 RX ADMIN — CYANOCOBALAMIN TAB 500 MCG 1000 MCG: 500 TAB at 09:40

## 2024-08-15 NOTE — PLAN OF CARE
Goal Outcome Evaluation:           Progress: no change    Outcome Evaluation: Rsd. is alert and oriented, able to make needs known to staff.  Rsd. has rested well this shift in between care.  Medicated x1 with prn motrin, baclofen and oxycodone x2, lomotil x1 - see emar.  Rsd.  States medications effective.  Rsd. also recieving scheduled voltaren gel to L) shoulder and L) elbow.  Rsd. tolerating well, states effective.  Call light and personal items within reach, Rsd. reminded to use call light for assistance, verbalizes understanding.  Rsd. refuses turns.  Weight shifts with trapeze bar.  Skin care completed to Stanley. lower legs this shift.  Aveeno lotion applied.  Rsd. refuses to transfer or get out of bed this shift.  X1-2 assist to turn and place on bedpan.  Activity encouraged.  WIll continue to monitor and notify on-coming staff.  Current plan of care remains in place this shift.

## 2024-08-15 NOTE — PLAN OF CARE
Goal Outcome Evaluation:  Plan of Care Reviewed With: patient        Progress: improving  Outcome Evaluation: Alert and oriented and pleasant withstaff. x2 or 3 max assist for transfers and ambulation. x1 admin PRN pain medication, with relief of symptoms noted. New order for stool for cdiff and Contact spore isolation recieved this shift. Sitting up in bed, call light in reach.

## 2024-08-16 ENCOUNTER — APPOINTMENT (OUTPATIENT)
Dept: GENERAL RADIOLOGY | Facility: HOSPITAL | Age: 64
DRG: 948 | End: 2024-08-16
Payer: MEDICARE

## 2024-08-16 LAB
027 TOXIN: ABNORMAL
ALBUMIN SERPL-MCNC: 2.7 G/DL (ref 3.5–5.2)
ANION GAP SERPL CALCULATED.3IONS-SCNC: 7.1 MMOL/L (ref 5–15)
BASOPHILS # BLD AUTO: 0.05 10*3/MM3 (ref 0–0.2)
BASOPHILS NFR BLD AUTO: 0.8 % (ref 0–1.5)
BUN SERPL-MCNC: 23 MG/DL (ref 8–23)
BUN/CREAT SERPL: 44.2 (ref 7–25)
C DIFF GDH + TOXINS A+B STL QL IA.RAPID: NEGATIVE
C DIFF TOX GENS STL QL NAA+PROBE: POSITIVE
CALCIUM SPEC-SCNC: 7.8 MG/DL (ref 8.6–10.5)
CHLORIDE SERPL-SCNC: 103 MMOL/L (ref 98–107)
CO2 SERPL-SCNC: 24.9 MMOL/L (ref 22–29)
CREAT SERPL-MCNC: 0.52 MG/DL (ref 0.76–1.27)
DEPRECATED RDW RBC AUTO: 47.6 FL (ref 37–54)
EGFRCR SERPLBLD CKD-EPI 2021: 111.9 ML/MIN/1.73
EOSINOPHIL # BLD AUTO: 0.12 10*3/MM3 (ref 0–0.4)
EOSINOPHIL NFR BLD AUTO: 2 % (ref 0.3–6.2)
ERYTHROCYTE [DISTWIDTH] IN BLOOD BY AUTOMATED COUNT: 15 % (ref 12.3–15.4)
GLUCOSE BLDC GLUCOMTR-MCNC: 149 MG/DL (ref 70–99)
GLUCOSE BLDC GLUCOMTR-MCNC: 159 MG/DL (ref 70–99)
GLUCOSE BLDC GLUCOMTR-MCNC: 187 MG/DL (ref 70–99)
GLUCOSE BLDC GLUCOMTR-MCNC: 193 MG/DL (ref 70–99)
GLUCOSE SERPL-MCNC: 145 MG/DL (ref 65–99)
HCT VFR BLD AUTO: 31.5 % (ref 37.5–51)
HGB BLD-MCNC: 10 G/DL (ref 13–17.7)
IMM GRANULOCYTES # BLD AUTO: 0.02 10*3/MM3 (ref 0–0.05)
IMM GRANULOCYTES NFR BLD AUTO: 0.3 % (ref 0–0.5)
LYMPHOCYTES # BLD AUTO: 1.36 10*3/MM3 (ref 0.7–3.1)
LYMPHOCYTES NFR BLD AUTO: 22.8 % (ref 19.6–45.3)
MCH RBC QN AUTO: 27.2 PG (ref 26.6–33)
MCHC RBC AUTO-ENTMCNC: 31.7 G/DL (ref 31.5–35.7)
MCV RBC AUTO: 85.8 FL (ref 79–97)
MONOCYTES # BLD AUTO: 0.61 10*3/MM3 (ref 0.1–0.9)
MONOCYTES NFR BLD AUTO: 10.2 % (ref 5–12)
NEUTROPHILS NFR BLD AUTO: 3.81 10*3/MM3 (ref 1.7–7)
NEUTROPHILS NFR BLD AUTO: 63.9 % (ref 42.7–76)
NRBC BLD AUTO-RTO: 0 /100 WBC (ref 0–0.2)
PHOSPHATE SERPL-MCNC: 3.5 MG/DL (ref 2.5–4.5)
PLATELET # BLD AUTO: 106 10*3/MM3 (ref 140–450)
PMV BLD AUTO: 11.2 FL (ref 6–12)
POTASSIUM SERPL-SCNC: 4.3 MMOL/L (ref 3.5–5.2)
RBC # BLD AUTO: 3.67 10*6/MM3 (ref 4.14–5.8)
SARS-COV-2 RNA RESP QL NAA+PROBE: NOT DETECTED
SODIUM SERPL-SCNC: 135 MMOL/L (ref 136–145)
WBC NRBC COR # BLD AUTO: 5.97 10*3/MM3 (ref 3.4–10.8)

## 2024-08-16 PROCEDURE — 87449 NOS EACH ORGANISM AG IA: CPT | Performed by: INTERNAL MEDICINE

## 2024-08-16 PROCEDURE — 87635 SARS-COV-2 COVID-19 AMP PRB: CPT | Performed by: PHYSICIAN ASSISTANT

## 2024-08-16 PROCEDURE — 87493 C DIFF AMPLIFIED PROBE: CPT | Performed by: INTERNAL MEDICINE

## 2024-08-16 PROCEDURE — 82948 REAGENT STRIP/BLOOD GLUCOSE: CPT

## 2024-08-16 PROCEDURE — 63710000001 INSULIN LISPRO (HUMAN) PER 5 UNITS: Performed by: PHYSICIAN ASSISTANT

## 2024-08-16 PROCEDURE — 80069 RENAL FUNCTION PANEL: CPT | Performed by: INTERNAL MEDICINE

## 2024-08-16 PROCEDURE — 63710000001 INSULIN GLARGINE PER 5 UNITS: Performed by: PHYSICIAN ASSISTANT

## 2024-08-16 PROCEDURE — 74018 RADEX ABDOMEN 1 VIEW: CPT

## 2024-08-16 PROCEDURE — 85025 COMPLETE CBC W/AUTO DIFF WBC: CPT | Performed by: INTERNAL MEDICINE

## 2024-08-16 RX ORDER — CHOLESTYRAMINE 4 G/9G
1 POWDER, FOR SUSPENSION ORAL EVERY 12 HOURS SCHEDULED
Status: DISCONTINUED | OUTPATIENT
Start: 2024-08-16 | End: 2024-08-29

## 2024-08-16 RX ADMIN — Medication 250 MG: at 10:02

## 2024-08-16 RX ADMIN — OXYCODONE HYDROCHLORIDE AND ACETAMINOPHEN 1 TABLET: 7.5; 325 TABLET ORAL at 23:23

## 2024-08-16 RX ADMIN — DICLOFENAC SODIUM 4 G: 10 GEL TOPICAL at 07:42

## 2024-08-16 RX ADMIN — PREGABALIN 100 MG: 100 CAPSULE ORAL at 20:24

## 2024-08-16 RX ADMIN — BACLOFEN 10 MG: 10 TABLET ORAL at 06:42

## 2024-08-16 RX ADMIN — FERROUS SULFATE TAB 325 MG (65 MG ELEMENTAL FE) 325 MG: 325 (65 FE) TAB at 07:42

## 2024-08-16 RX ADMIN — DICLOFENAC SODIUM 4 G: 10 GEL TOPICAL at 01:17

## 2024-08-16 RX ADMIN — INSULIN GLARGINE 22 UNITS: 100 INJECTION, SOLUTION SUBCUTANEOUS at 10:01

## 2024-08-16 RX ADMIN — INSULIN LISPRO 3 UNITS: 100 INJECTION, SOLUTION INTRAVENOUS; SUBCUTANEOUS at 17:33

## 2024-08-16 RX ADMIN — CYANOCOBALAMIN TAB 500 MCG 1000 MCG: 500 TAB at 10:02

## 2024-08-16 RX ADMIN — OXYCODONE HYDROCHLORIDE AND ACETAMINOPHEN 1 TABLET: 7.5; 325 TABLET ORAL at 14:54

## 2024-08-16 RX ADMIN — BACLOFEN 10 MG: 10 TABLET ORAL at 14:54

## 2024-08-16 RX ADMIN — ATORVASTATIN CALCIUM 10 MG: 10 TABLET, FILM COATED ORAL at 20:23

## 2024-08-16 RX ADMIN — EMPAGLIFLOZIN 10 MG: 10 TABLET, FILM COATED ORAL at 10:02

## 2024-08-16 RX ADMIN — SERTRALINE HYDROCHLORIDE 50 MG: 50 TABLET ORAL at 20:24

## 2024-08-16 RX ADMIN — ACETAMINOPHEN 650 MG: 325 TABLET ORAL at 17:33

## 2024-08-16 RX ADMIN — IBUPROFEN 400 MG: 400 TABLET ORAL at 13:47

## 2024-08-16 RX ADMIN — DICLOFENAC SODIUM 4 G: 10 GEL TOPICAL at 20:27

## 2024-08-16 RX ADMIN — FUROSEMIDE 40 MG: 40 TABLET ORAL at 17:33

## 2024-08-16 RX ADMIN — INSULIN LISPRO 3 UNITS: 100 INJECTION, SOLUTION INTRAVENOUS; SUBCUTANEOUS at 11:58

## 2024-08-16 RX ADMIN — BACLOFEN 10 MG: 10 TABLET ORAL at 23:23

## 2024-08-16 RX ADMIN — FUROSEMIDE 40 MG: 40 TABLET ORAL at 10:02

## 2024-08-16 RX ADMIN — LISINOPRIL 2.5 MG: 2.5 TABLET ORAL at 10:02

## 2024-08-16 RX ADMIN — MONTELUKAST 10 MG: 10 TABLET, FILM COATED ORAL at 20:24

## 2024-08-16 RX ADMIN — Medication 10 MG: at 20:24

## 2024-08-16 RX ADMIN — PREGABALIN 100 MG: 100 CAPSULE ORAL at 10:02

## 2024-08-16 RX ADMIN — IBUPROFEN 400 MG: 400 TABLET ORAL at 02:36

## 2024-08-16 RX ADMIN — CHOLESTYRAMINE 1 PACKET: 4 POWDER, FOR SUSPENSION ORAL at 21:05

## 2024-08-16 RX ADMIN — Medication 250 MG: at 20:24

## 2024-08-16 RX ADMIN — CETIRIZINE HYDROCHLORIDE 10 MG: 10 TABLET, FILM COATED ORAL at 10:02

## 2024-08-16 RX ADMIN — APIXABAN 5 MG: 5 TABLET, FILM COATED ORAL at 10:02

## 2024-08-16 RX ADMIN — INSULIN GLARGINE 22 UNITS: 100 INJECTION, SOLUTION SUBCUTANEOUS at 20:24

## 2024-08-16 RX ADMIN — OXYCODONE HYDROCHLORIDE AND ACETAMINOPHEN 1 TABLET: 7.5; 325 TABLET ORAL at 06:42

## 2024-08-16 RX ADMIN — PREGABALIN 100 MG: 100 CAPSULE ORAL at 16:56

## 2024-08-16 RX ADMIN — DICLOFENAC SODIUM 4 G: 10 GEL TOPICAL at 13:48

## 2024-08-16 RX ADMIN — APIXABAN 5 MG: 5 TABLET, FILM COATED ORAL at 20:23

## 2024-08-16 RX ADMIN — INSULIN LISPRO 3 UNITS: 100 INJECTION, SOLUTION INTRAVENOUS; SUBCUTANEOUS at 07:42

## 2024-08-16 RX ADMIN — BISMUTH SUBSALICYLATE 524 MG: 262 TABLET, CHEWABLE ORAL at 02:35

## 2024-08-16 NOTE — PLAN OF CARE
Goal Outcome Evaluation:  Plan of Care Reviewed With: patient        Progress: improving  Outcome Evaluation: Alert and oriented and pleasnat with staff. x3max assist for transfers and ambulation. x3 admin PRN pain medicaiton this shift, with relief of symptoms noted. Continues in  Contact Spore Isolation, last liquid BM noted at 0242hr. BM's noted for this shift, have had some formation to them. Sitting up in bed, call light in reach.

## 2024-08-16 NOTE — PLAN OF CARE
Goal Outcome Evaluation:         No major Changes, pt stool sample positive for c-diff, HUSAM ryan informed via voalte, pt pain managed throughout the shift, pt turned throughout the night, oral rehydration promoted.

## 2024-08-17 LAB
GLUCOSE BLDC GLUCOMTR-MCNC: 132 MG/DL (ref 70–99)
GLUCOSE BLDC GLUCOMTR-MCNC: 135 MG/DL (ref 70–99)
GLUCOSE BLDC GLUCOMTR-MCNC: 147 MG/DL (ref 70–99)
GLUCOSE BLDC GLUCOMTR-MCNC: 149 MG/DL (ref 70–99)

## 2024-08-17 PROCEDURE — 63710000001 INSULIN GLARGINE PER 5 UNITS: Performed by: PHYSICIAN ASSISTANT

## 2024-08-17 PROCEDURE — 63710000001 ONDANSETRON ODT 4 MG TABLET DISPERSIBLE: Performed by: PHYSICIAN ASSISTANT

## 2024-08-17 PROCEDURE — 82948 REAGENT STRIP/BLOOD GLUCOSE: CPT

## 2024-08-17 RX ADMIN — CETIRIZINE HYDROCHLORIDE 10 MG: 10 TABLET, FILM COATED ORAL at 09:29

## 2024-08-17 RX ADMIN — BACLOFEN 10 MG: 10 TABLET ORAL at 07:17

## 2024-08-17 RX ADMIN — FUROSEMIDE 40 MG: 40 TABLET ORAL at 09:29

## 2024-08-17 RX ADMIN — SERTRALINE HYDROCHLORIDE 50 MG: 50 TABLET ORAL at 20:15

## 2024-08-17 RX ADMIN — PREGABALIN 100 MG: 100 CAPSULE ORAL at 09:29

## 2024-08-17 RX ADMIN — PREGABALIN 100 MG: 100 CAPSULE ORAL at 20:16

## 2024-08-17 RX ADMIN — Medication 250 MG: at 20:16

## 2024-08-17 RX ADMIN — BACLOFEN 10 MG: 10 TABLET ORAL at 16:05

## 2024-08-17 RX ADMIN — DICLOFENAC SODIUM 4 G: 10 GEL TOPICAL at 20:16

## 2024-08-17 RX ADMIN — LISINOPRIL 2.5 MG: 2.5 TABLET ORAL at 09:29

## 2024-08-17 RX ADMIN — INSULIN GLARGINE 22 UNITS: 100 INJECTION, SOLUTION SUBCUTANEOUS at 09:29

## 2024-08-17 RX ADMIN — Medication 250 MG: at 09:29

## 2024-08-17 RX ADMIN — BISMUTH SUBSALICYLATE 524 MG: 262 TABLET, CHEWABLE ORAL at 20:15

## 2024-08-17 RX ADMIN — CHOLESTYRAMINE 1 PACKET: 4 POWDER, FOR SUSPENSION ORAL at 10:32

## 2024-08-17 RX ADMIN — ATORVASTATIN CALCIUM 10 MG: 10 TABLET, FILM COATED ORAL at 20:16

## 2024-08-17 RX ADMIN — PREGABALIN 100 MG: 100 CAPSULE ORAL at 16:05

## 2024-08-17 RX ADMIN — INSULIN GLARGINE 22 UNITS: 100 INJECTION, SOLUTION SUBCUTANEOUS at 20:24

## 2024-08-17 RX ADMIN — MONTELUKAST 10 MG: 10 TABLET, FILM COATED ORAL at 20:15

## 2024-08-17 RX ADMIN — OXYCODONE HYDROCHLORIDE AND ACETAMINOPHEN 1 TABLET: 7.5; 325 TABLET ORAL at 16:05

## 2024-08-17 RX ADMIN — Medication 10 MG: at 20:15

## 2024-08-17 RX ADMIN — FUROSEMIDE 40 MG: 40 TABLET ORAL at 17:56

## 2024-08-17 RX ADMIN — EMPAGLIFLOZIN 10 MG: 10 TABLET, FILM COATED ORAL at 09:29

## 2024-08-17 RX ADMIN — APIXABAN 5 MG: 5 TABLET, FILM COATED ORAL at 09:29

## 2024-08-17 RX ADMIN — FERROUS SULFATE TAB 325 MG (65 MG ELEMENTAL FE) 325 MG: 325 (65 FE) TAB at 08:03

## 2024-08-17 RX ADMIN — ONDANSETRON 4 MG: 4 TABLET, ORALLY DISINTEGRATING ORAL at 22:32

## 2024-08-17 RX ADMIN — CHOLESTYRAMINE 1 PACKET: 4 POWDER, FOR SUSPENSION ORAL at 20:16

## 2024-08-17 RX ADMIN — CYANOCOBALAMIN TAB 500 MCG 1000 MCG: 500 TAB at 09:29

## 2024-08-17 RX ADMIN — DICLOFENAC SODIUM 4 G: 10 GEL TOPICAL at 14:57

## 2024-08-17 RX ADMIN — DICLOFENAC SODIUM 4 G: 10 GEL TOPICAL at 08:04

## 2024-08-17 RX ADMIN — OXYCODONE HYDROCHLORIDE AND ACETAMINOPHEN 1 TABLET: 7.5; 325 TABLET ORAL at 07:17

## 2024-08-17 RX ADMIN — IBUPROFEN 400 MG: 400 TABLET ORAL at 12:43

## 2024-08-17 RX ADMIN — APIXABAN 5 MG: 5 TABLET, FILM COATED ORAL at 20:14

## 2024-08-17 NOTE — PLAN OF CARE
Goal Outcome Evaluation:  Plan of Care Reviewed With: patient        Progress: improving  Outcome Evaluation: Alert and oriented and pleasant with staff. x3 Max assist for transfers and ambulation. x3 admin PRN pain medication, with relief of symptoms noted thi shift. New order for one time Jamil recieved this shift. Continues with loose BM this shift. Sitting up in bed, call light in reach.

## 2024-08-17 NOTE — PLAN OF CARE
Goal Outcome Evaluation:  Plan of Care Reviewed With: patient        Progress: improving  Outcome Evaluation: No significant changes this shift. Uses urinal and bedpan at night and is a 3 max assist for transfers during the day.  Was medicated for pain and discomfort x 2, effective. Will continue with his plan of care. Call light, urinal and personal items within reach.

## 2024-08-18 PROBLEM — Z91.119 NONCOMPLIANCE OF PATIENT WITH DIETARY REGIMEN: Status: ACTIVE | Noted: 2024-08-18

## 2024-08-18 LAB
ALBUMIN SERPL-MCNC: 2.9 G/DL (ref 3.5–5.2)
ANION GAP SERPL CALCULATED.3IONS-SCNC: 8.4 MMOL/L (ref 5–15)
BUN SERPL-MCNC: 18 MG/DL (ref 8–23)
BUN/CREAT SERPL: 34 (ref 7–25)
CALCIUM SPEC-SCNC: 8.1 MG/DL (ref 8.6–10.5)
CHLORIDE SERPL-SCNC: 105 MMOL/L (ref 98–107)
CO2 SERPL-SCNC: 22.6 MMOL/L (ref 22–29)
CREAT SERPL-MCNC: 0.53 MG/DL (ref 0.76–1.27)
DEPRECATED RDW RBC AUTO: 46.8 FL (ref 37–54)
EGFRCR SERPLBLD CKD-EPI 2021: 111.2 ML/MIN/1.73
ERYTHROCYTE [DISTWIDTH] IN BLOOD BY AUTOMATED COUNT: 15.1 % (ref 12.3–15.4)
GLUCOSE BLDC GLUCOMTR-MCNC: 116 MG/DL (ref 70–99)
GLUCOSE BLDC GLUCOMTR-MCNC: 130 MG/DL (ref 70–99)
GLUCOSE BLDC GLUCOMTR-MCNC: 149 MG/DL (ref 70–99)
GLUCOSE BLDC GLUCOMTR-MCNC: 155 MG/DL (ref 70–99)
GLUCOSE SERPL-MCNC: 112 MG/DL (ref 65–99)
HCT VFR BLD AUTO: 31.5 % (ref 37.5–51)
HGB BLD-MCNC: 10 G/DL (ref 13–17.7)
MAGNESIUM SERPL-MCNC: 2 MG/DL (ref 1.6–2.4)
MCH RBC QN AUTO: 27.1 PG (ref 26.6–33)
MCHC RBC AUTO-ENTMCNC: 31.7 G/DL (ref 31.5–35.7)
MCV RBC AUTO: 85.4 FL (ref 79–97)
PHOSPHATE SERPL-MCNC: 3.5 MG/DL (ref 2.5–4.5)
PLATELET # BLD AUTO: 116 10*3/MM3 (ref 140–450)
PMV BLD AUTO: 12 FL (ref 6–12)
POTASSIUM SERPL-SCNC: 4.1 MMOL/L (ref 3.5–5.2)
RBC # BLD AUTO: 3.69 10*6/MM3 (ref 4.14–5.8)
SODIUM SERPL-SCNC: 136 MMOL/L (ref 136–145)
WBC NRBC COR # BLD AUTO: 6.01 10*3/MM3 (ref 3.4–10.8)

## 2024-08-18 PROCEDURE — 80069 RENAL FUNCTION PANEL: CPT | Performed by: PHYSICIAN ASSISTANT

## 2024-08-18 PROCEDURE — 63710000001 INSULIN LISPRO (HUMAN) PER 5 UNITS: Performed by: PHYSICIAN ASSISTANT

## 2024-08-18 PROCEDURE — 99309 SBSQ NF CARE MODERATE MDM 30: CPT | Performed by: INTERNAL MEDICINE

## 2024-08-18 PROCEDURE — 82948 REAGENT STRIP/BLOOD GLUCOSE: CPT

## 2024-08-18 PROCEDURE — 87046 STOOL CULTR AEROBIC BACT EA: CPT | Performed by: INTERNAL MEDICINE

## 2024-08-18 PROCEDURE — 87045 FECES CULTURE AEROBIC BACT: CPT | Performed by: INTERNAL MEDICINE

## 2024-08-18 PROCEDURE — 63710000001 INSULIN GLARGINE PER 5 UNITS: Performed by: PHYSICIAN ASSISTANT

## 2024-08-18 PROCEDURE — 85027 COMPLETE CBC AUTOMATED: CPT | Performed by: PHYSICIAN ASSISTANT

## 2024-08-18 PROCEDURE — 83735 ASSAY OF MAGNESIUM: CPT | Performed by: PHYSICIAN ASSISTANT

## 2024-08-18 PROCEDURE — 87427 SHIGA-LIKE TOXIN AG IA: CPT | Performed by: INTERNAL MEDICINE

## 2024-08-18 RX ORDER — VANCOMYCIN HYDROCHLORIDE 125 MG/1
125 CAPSULE ORAL EVERY 12 HOURS SCHEDULED
Status: DISCONTINUED | OUTPATIENT
Start: 2024-08-28 | End: 2024-08-18

## 2024-08-18 RX ORDER — VANCOMYCIN HYDROCHLORIDE 125 MG/1
125 CAPSULE ORAL EVERY 24 HOURS
Status: DISCONTINUED | OUTPATIENT
Start: 2024-09-04 | End: 2024-08-18

## 2024-08-18 RX ORDER — VANCOMYCIN HYDROCHLORIDE 125 MG/1
125 CAPSULE ORAL EVERY 6 HOURS SCHEDULED
Status: DISCONTINUED | OUTPATIENT
Start: 2024-09-11 | End: 2024-08-18 | Stop reason: SDUPTHER

## 2024-08-18 RX ORDER — SACCHAROMYCES BOULARDII 250 MG
500 CAPSULE ORAL 2 TIMES DAILY
Status: DISPENSED | OUTPATIENT
Start: 2024-08-18

## 2024-08-18 RX ORDER — VANCOMYCIN HYDROCHLORIDE 125 MG/1
125 CAPSULE ORAL EVERY 6 HOURS SCHEDULED
Status: DISCONTINUED | OUTPATIENT
Start: 2024-08-18 | End: 2024-08-18

## 2024-08-18 RX ADMIN — LISINOPRIL 2.5 MG: 2.5 TABLET ORAL at 09:05

## 2024-08-18 RX ADMIN — DICLOFENAC SODIUM 4 G: 10 GEL TOPICAL at 07:57

## 2024-08-18 RX ADMIN — Medication 250 MG: at 09:05

## 2024-08-18 RX ADMIN — Medication 10 MG: at 20:26

## 2024-08-18 RX ADMIN — OXYCODONE HYDROCHLORIDE AND ACETAMINOPHEN 1 TABLET: 7.5; 325 TABLET ORAL at 09:05

## 2024-08-18 RX ADMIN — FERROUS SULFATE TAB 325 MG (65 MG ELEMENTAL FE) 325 MG: 325 (65 FE) TAB at 07:57

## 2024-08-18 RX ADMIN — ATORVASTATIN CALCIUM 10 MG: 10 TABLET, FILM COATED ORAL at 20:26

## 2024-08-18 RX ADMIN — PREGABALIN 100 MG: 100 CAPSULE ORAL at 17:10

## 2024-08-18 RX ADMIN — OXYCODONE HYDROCHLORIDE AND ACETAMINOPHEN 1 TABLET: 7.5; 325 TABLET ORAL at 00:01

## 2024-08-18 RX ADMIN — FUROSEMIDE 40 MG: 40 TABLET ORAL at 09:05

## 2024-08-18 RX ADMIN — BACLOFEN 10 MG: 10 TABLET ORAL at 00:01

## 2024-08-18 RX ADMIN — APIXABAN 5 MG: 5 TABLET, FILM COATED ORAL at 09:05

## 2024-08-18 RX ADMIN — BACLOFEN 10 MG: 10 TABLET ORAL at 17:10

## 2024-08-18 RX ADMIN — BACLOFEN 10 MG: 10 TABLET ORAL at 09:05

## 2024-08-18 RX ADMIN — Medication 500 MG: at 20:26

## 2024-08-18 RX ADMIN — Medication 1 APPLICATION: at 14:35

## 2024-08-18 RX ADMIN — DICLOFENAC SODIUM 4 G: 10 GEL TOPICAL at 13:51

## 2024-08-18 RX ADMIN — CETIRIZINE HYDROCHLORIDE 10 MG: 10 TABLET, FILM COATED ORAL at 09:05

## 2024-08-18 RX ADMIN — SERTRALINE HYDROCHLORIDE 50 MG: 50 TABLET ORAL at 20:26

## 2024-08-18 RX ADMIN — INSULIN GLARGINE 22 UNITS: 100 INJECTION, SOLUTION SUBCUTANEOUS at 09:04

## 2024-08-18 RX ADMIN — CHOLESTYRAMINE 1 PACKET: 4 POWDER, FOR SUSPENSION ORAL at 09:04

## 2024-08-18 RX ADMIN — IBUPROFEN 400 MG: 400 TABLET ORAL at 13:51

## 2024-08-18 RX ADMIN — EMPAGLIFLOZIN 10 MG: 10 TABLET, FILM COATED ORAL at 09:05

## 2024-08-18 RX ADMIN — INSULIN LISPRO 3 UNITS: 100 INJECTION, SOLUTION INTRAVENOUS; SUBCUTANEOUS at 17:29

## 2024-08-18 RX ADMIN — OXYCODONE HYDROCHLORIDE AND ACETAMINOPHEN 1 TABLET: 7.5; 325 TABLET ORAL at 17:10

## 2024-08-18 RX ADMIN — DICLOFENAC SODIUM 4 G: 10 GEL TOPICAL at 20:25

## 2024-08-18 RX ADMIN — FUROSEMIDE 40 MG: 40 TABLET ORAL at 17:29

## 2024-08-18 RX ADMIN — MONTELUKAST 10 MG: 10 TABLET, FILM COATED ORAL at 20:27

## 2024-08-18 RX ADMIN — BISMUTH SUBSALICYLATE 524 MG: 262 TABLET, CHEWABLE ORAL at 04:50

## 2024-08-18 RX ADMIN — PREGABALIN 100 MG: 100 CAPSULE ORAL at 20:27

## 2024-08-18 RX ADMIN — CYANOCOBALAMIN TAB 500 MCG 1000 MCG: 500 TAB at 09:05

## 2024-08-18 RX ADMIN — APIXABAN 5 MG: 5 TABLET, FILM COATED ORAL at 20:27

## 2024-08-18 RX ADMIN — CHOLESTYRAMINE 1 PACKET: 4 POWDER, FOR SUSPENSION ORAL at 20:25

## 2024-08-18 RX ADMIN — INSULIN GLARGINE 22 UNITS: 100 INJECTION, SOLUTION SUBCUTANEOUS at 20:28

## 2024-08-18 RX ADMIN — PREGABALIN 100 MG: 100 CAPSULE ORAL at 09:05

## 2024-08-18 RX ADMIN — VANCOMYCIN HYDROCHLORIDE 125 MG: 125 CAPSULE ORAL at 14:33

## 2024-08-18 RX ADMIN — DICLOFENAC SODIUM 4 G: 10 GEL TOPICAL at 03:08

## 2024-08-18 NOTE — PLAN OF CARE
Goal Outcome Evaluation:  Plan of Care Reviewed With: patient        Progress: improving  Outcome Evaluation: Pt is AO x 4 and VSS. He is a 1 assist for the bedpan and a max assist of 3 for transfers and ambulation. He was medicated for pain, nausea and diarrhea during the night. Will continue with his plan of care. Call light and personal items within reach.

## 2024-08-18 NOTE — PROGRESS NOTES
" [facility]   Hospitalist Progress Note  Date: 2024  Patient Name: Jose Shaikh  : 1960  MRN: [id number]  Date of admission: 2023      Subjective   Subjective     Chief Complaint: Weakness    Summary: Jose Shaikh is a 63 y.o. male  initially hospitalized on 9/10/2023, prolonged hospitalization for treatment of management of generalized weakness, deconditioning, with acute issues of diarrhea, C. difficile negative.  Diarrhea improved.  Had small hematoma after ambulating on his foot.  Unfortunately with exhaustive efforts to try to place this gentleman after 39 days of hospitalization, we are unable to find a facility that will accept him.  He has no further acute needs or requirements for inpatient monitoring and management, his labs and vitals are stable, he is tolerating oral intake, and has been refusing turns and repositionings and other interventions with nursing staff despite recommendations.  Patient discharged in hemodynamically stable condition on 10/19/2023 to follow-up with PCP within 1 week. Unfortunately we cannot solve all of his social issues during this hospitalization due to lack of resources and participation from the patient's perspective despite all our efforts.  Patient has a financial means of making arrangements at home.  Patient appealed his discharge.  Lost his appeal.  LCD: 10/20/23, PFL beginning: 10/21/23 @ 12pm.  Due to an inability to place the patient in a.m. nursing home he has been placed in our skilled nursing facility until arrangements can be made or until he is able to care for himself.      Interval Followup: Patient has been having diarrhea for a few days. C diff studies were consistent with colonization.  Patient continues to refuse to be weighed, has not done one in almost two weeks ().  Per nursing, patient continues to Door Dash.  He denies it, asking \"Are you telling me you've personally stood there and watched me do it?\"  Documentation also " indicates that he refuses turns, refuses alarms, declines to get up.      Objective   Objective     Vitals:   Temp:  [97.7 °F (36.5 °C)-97.9 °F (36.6 °C)] 97.7 °F (36.5 °C)  Heart Rate:  [83-84] 83  Resp:  [16-18] 18  BP: (104-123)/(51-68) 104/51    Physical Exam   Constitutional: No distress.  Alert and conversational.  Respiratory: No wheeze noted.  GI: Abdomen: Obese  Neuro/psych:  Calm mood.  No dysarthria.  Extremities: chronic edema mike LE, R>L    Result Review    Result Review:  I have personally reviewed the results from the time of this admission to 8/18/2024 14:43 EDT and agree with these findings:  []  Laboratory  []  Microbiology  []  Radiology  []  EKG/Telemetry   []  Cardiology/Vascular   []  Pathology  []  Old records  []  Other:    Assessment & Plan   Assessment / Plan   Assessment:  Hospital-acquired weakness  Noncompliance  Hx of Influenza A  Hx of C. difficile diarrhea treated  Generalized weakness  Deconditioning  DM (Kami Garcia and PCP)  HTN  PAF (on Eliquis; previously seen Dr. Duval)  Morbid obesity with BMI 44  Debility with wheelchair dependence  Hx of severe left hip pain  Severe degenerative joint disease of lower extremities  Hx L calcaneus osteomyelitis  Chronic venous stasis  Hx R transmetatarsal amputation  Chronic bilateral foot wounds with right transmetatarsal amputation, healing by secondary intention (wound care clinic; podiatrist Gordon)  Thrombocytopenia  Conjunctivitis'  Diarrhea, possibly from Ozempic      Plan:  C diff studies consistent with colonization; d/w ID.   Suspect diarrhea is due to GI intolerance of Ozempic  Began Questran  Have ordered a stool culture as well to make sure no other infection  Patient refuses to be weighed. His last weight was on 8/5 - his weight had increased slightly from May.  Since the patient is noncompliant with diet, is having intolerance to Ozempic, and is not losing weight, I will discontinue the Ozempic. At this point I am concerned he  is having adverse effect but no benefit. This has likely been exacerbated by his prolonged noncompliance with recommended diet, which has been discussed with him on multiple occasions.  We have discussed how detrimental this is to his overall health, can deconditioning and even longevity.  We have discussed how this negates the effect of Ozempic.  Palliative care has been consulted as well given his poor mobility state and lack of compliance increasing his likelihood of a poor prognosis.  Patient has been evaluated by at least 3 different orthopedic surgeons, including a [facility], and all have advised him that he must lose weight to be considered a candidate for surgery. Unfortunately he has not exercised the degree of compliance needed and advised and has not lost any additional weight.    DVT prophylaxis:  Pharmacologic VTE prophylaxis orders are present.    CODE STATUS:   Code Status (Patient has no pulse and is not breathing): CPR (Attempt to Resuscitate)  Medical Interventions (Patient has pulse or is breathing): Full Support    Electronically signed by Jose Maya MD, 08/18/24, 2:52 PM EDT.

## 2024-08-18 NOTE — PLAN OF CARE
Goal Outcome Evaluation:  Plan of Care Reviewed With: patient        Progress: improving  Outcome Evaluation: Alert and oriented and pleasant with staff. x2max assist for transfers and ambulation. x3 admin PRN pain medication, with relief of symptoms. Continues with loose BM this shift, stool sample obtained and sent for culture. Awaiting new results. New order recieved to Discontinue Ozempic due to possible adverse side effect. Sitting up in bed, call light in reach.

## 2024-08-19 LAB
GLUCOSE BLDC GLUCOMTR-MCNC: 107 MG/DL (ref 70–99)
GLUCOSE BLDC GLUCOMTR-MCNC: 110 MG/DL (ref 70–99)
GLUCOSE BLDC GLUCOMTR-MCNC: 114 MG/DL (ref 70–99)
GLUCOSE BLDC GLUCOMTR-MCNC: 121 MG/DL (ref 70–99)
SARS-COV-2 RNA RESP QL NAA+PROBE: NOT DETECTED

## 2024-08-19 PROCEDURE — 63710000001 INSULIN GLARGINE PER 5 UNITS: Performed by: PHYSICIAN ASSISTANT

## 2024-08-19 PROCEDURE — 87635 SARS-COV-2 COVID-19 AMP PRB: CPT | Performed by: PHYSICIAN ASSISTANT

## 2024-08-19 PROCEDURE — 82948 REAGENT STRIP/BLOOD GLUCOSE: CPT

## 2024-08-19 RX ADMIN — LISINOPRIL 2.5 MG: 2.5 TABLET ORAL at 09:51

## 2024-08-19 RX ADMIN — ACETAMINOPHEN 650 MG: 325 TABLET ORAL at 20:55

## 2024-08-19 RX ADMIN — MONTELUKAST 10 MG: 10 TABLET, FILM COATED ORAL at 20:55

## 2024-08-19 RX ADMIN — INSULIN GLARGINE 22 UNITS: 100 INJECTION, SOLUTION SUBCUTANEOUS at 20:55

## 2024-08-19 RX ADMIN — IBUPROFEN 400 MG: 400 TABLET ORAL at 23:32

## 2024-08-19 RX ADMIN — SERTRALINE HYDROCHLORIDE 50 MG: 50 TABLET ORAL at 20:55

## 2024-08-19 RX ADMIN — PREGABALIN 100 MG: 100 CAPSULE ORAL at 15:59

## 2024-08-19 RX ADMIN — CYANOCOBALAMIN TAB 500 MCG 1000 MCG: 500 TAB at 08:55

## 2024-08-19 RX ADMIN — PREGABALIN 100 MG: 100 CAPSULE ORAL at 20:56

## 2024-08-19 RX ADMIN — FUROSEMIDE 40 MG: 40 TABLET ORAL at 08:55

## 2024-08-19 RX ADMIN — Medication 1 APPLICATION: at 15:59

## 2024-08-19 RX ADMIN — ATORVASTATIN CALCIUM 10 MG: 10 TABLET, FILM COATED ORAL at 20:55

## 2024-08-19 RX ADMIN — DICLOFENAC SODIUM 4 G: 10 GEL TOPICAL at 09:03

## 2024-08-19 RX ADMIN — BACLOFEN 10 MG: 10 TABLET ORAL at 16:57

## 2024-08-19 RX ADMIN — BISMUTH SUBSALICYLATE 524 MG: 262 TABLET, CHEWABLE ORAL at 13:44

## 2024-08-19 RX ADMIN — CHOLESTYRAMINE 1 PACKET: 4 POWDER, FOR SUSPENSION ORAL at 20:56

## 2024-08-19 RX ADMIN — Medication 10 MG: at 20:56

## 2024-08-19 RX ADMIN — IBUPROFEN 400 MG: 400 TABLET ORAL at 06:36

## 2024-08-19 RX ADMIN — FUROSEMIDE 40 MG: 40 TABLET ORAL at 17:41

## 2024-08-19 RX ADMIN — OXYCODONE HYDROCHLORIDE AND ACETAMINOPHEN 1 TABLET: 7.5; 325 TABLET ORAL at 16:57

## 2024-08-19 RX ADMIN — Medication 500 MG: at 08:55

## 2024-08-19 RX ADMIN — BACLOFEN 10 MG: 10 TABLET ORAL at 08:55

## 2024-08-19 RX ADMIN — EMPAGLIFLOZIN 10 MG: 10 TABLET, FILM COATED ORAL at 08:55

## 2024-08-19 RX ADMIN — PREGABALIN 100 MG: 100 CAPSULE ORAL at 08:55

## 2024-08-19 RX ADMIN — OXYCODONE HYDROCHLORIDE AND ACETAMINOPHEN 1 TABLET: 7.5; 325 TABLET ORAL at 00:57

## 2024-08-19 RX ADMIN — BACLOFEN 10 MG: 10 TABLET ORAL at 00:57

## 2024-08-19 RX ADMIN — DICLOFENAC SODIUM 4 G: 10 GEL TOPICAL at 20:56

## 2024-08-19 RX ADMIN — INSULIN GLARGINE 22 UNITS: 100 INJECTION, SOLUTION SUBCUTANEOUS at 08:55

## 2024-08-19 RX ADMIN — CETIRIZINE HYDROCHLORIDE 10 MG: 10 TABLET, FILM COATED ORAL at 08:55

## 2024-08-19 RX ADMIN — Medication 500 MG: at 20:55

## 2024-08-19 RX ADMIN — ACETAMINOPHEN 650 MG: 325 TABLET ORAL at 00:09

## 2024-08-19 RX ADMIN — APIXABAN 5 MG: 5 TABLET, FILM COATED ORAL at 20:56

## 2024-08-19 RX ADMIN — DICLOFENAC SODIUM 4 G: 10 GEL TOPICAL at 13:46

## 2024-08-19 RX ADMIN — OXYCODONE HYDROCHLORIDE AND ACETAMINOPHEN 1 TABLET: 7.5; 325 TABLET ORAL at 08:55

## 2024-08-19 RX ADMIN — CHOLESTYRAMINE 1 PACKET: 4 POWDER, FOR SUSPENSION ORAL at 08:54

## 2024-08-19 RX ADMIN — FERROUS SULFATE TAB 325 MG (65 MG ELEMENTAL FE) 325 MG: 325 (65 FE) TAB at 08:55

## 2024-08-19 RX ADMIN — APIXABAN 5 MG: 5 TABLET, FILM COATED ORAL at 08:55

## 2024-08-19 RX ADMIN — DICLOFENAC SODIUM 4 G: 10 GEL TOPICAL at 02:05

## 2024-08-19 NOTE — PLAN OF CARE
Goal Outcome Evaluation:  Plan of Care Reviewed With: patient        Progress: no change  Outcome Evaluation: Resident alert, oriented, able to make needs known to staff. remains bedbound. continues with loose stools, incontiment all day. Blood glucose stable for all meals, no sliding scale required. Medicated for prn pain x2 this shift, effective. resident slept after med administration. Will cont. current plan of care.

## 2024-08-19 NOTE — PLAN OF CARE
Goal Outcome Evaluation:  Plan of Care Reviewed With: patient        Progress: improving  Outcome Evaluation: No significant changes in pt status. AO x 4 and VSS. He was medicated for pain and discomfort x 2. He continues to have loose black stools. Will continue with his plan of care. Call light within reach.

## 2024-08-19 NOTE — PROGRESS NOTES
"Nursing Facility Medication Regimen Review    Potentially Clinically Significant Medication Issues during this review: []Identified   [x]Not identified    Provider acknowledgement required?   []Yes   [x]No    Jose Shaikh is a 63 y.o.male admitted by Cailin Acosta MD , on 11/6/2023  1:34 PM , for Debility [R53.81]    Visit Vitals  /62 (BP Location: Right arm, Patient Position: Lying)   Pulse 76   Temp 97.9 °F (36.6 °C) (Oral)   Resp 20   Ht 188 cm (74.02\")   Wt (!) 158 kg (348 lb 15.8 oz)   SpO2 97%   BMI 44.79 kg/m²      Lab Results   Component Value Date    GLUCOSE 112 (H) 08/18/2024    BUN 18 08/18/2024    CREATININE 0.53 (L) 08/18/2024    EGFRIFNONA 134 12/20/2021    BCR 34.0 (H) 08/18/2024    K 4.1 08/18/2024    CO2 22.6 08/18/2024    CALCIUM 8.1 (L) 08/18/2024    ALBUMIN 2.9 (L) 08/18/2024    LABIL2 1.3 (L) 08/07/2019    AST 26 08/14/2024    ALT 15 08/14/2024    WBC 6.01 08/18/2024    HGB 10.0 (L) 08/18/2024    HCT 31.5 (L) 08/18/2024    MCV 85.4 08/18/2024     (L) 08/18/2024        Active Ambulatory Problems     Diagnosis Date Noted    Diabetic ulcer of left heel associated with type 2 DM 07/06/2021    Acute osteomyelitis of left calcaneus  08/18/2021    Diabetic ulcer of left heel associated with type 2 DM 08/18/2021    Diabetic ulcer of right midfoot associated with type 2 DM 08/18/2021    Paroxysmal atrial fibrillation 08/31/2021    Essential hypertension 08/31/2021    Hyperlipidemia LDL goal <100 08/31/2021    Cellulitis and abscess of foot 12/01/2021    High alkaline phosphatase level 12/19/2021    Osteomyelitis 10/01/2022    Onychomycosis 10/02/2022    Onychocryptosis 10/02/2022    Foot pain, bilateral 10/02/2022    Osteomyelitis of foot, right, acute 10/05/2022    Cellulitis of right foot 10/05/2022    Type 2 diabetes mellitus, with long-term current use of insulin 11/08/2022    Class 3 severe obesity due to excess calories with serious comorbidity and body mass index (BMI) of 45.0 " to 49.9 in adult 11/08/2022    Anxiety disorder, unspecified 10/07/2022    Claustrophobia 05/02/2022    Dependence on wheelchair 10/27/2022    Depression, unspecified 05/02/2022    Long term (current) use of anticoagulants 05/02/2022    Long term (current) use of oral hypoglycemic drugs 05/02/2022    Wound of foot 05/02/2022    Ulcer of right foot 05/02/2022    Orthostatic hypotension 10/07/2022    Other chronic osteomyelitis, right ankle and foot 10/07/2022    Personal history of nicotine dependence 05/02/2022    Thrombocytopenia, unspecified 10/07/2022    Unspecified open wound, right foot, initial encounter 04/03/2023    Diabetic foot infection 04/03/2023    Subacute osteomyelitis of right foot 04/06/2023    Right foot pain 05/12/2023    Sepsis 05/12/2023    Onychomycosis 05/22/2023    Foot pain, left 05/22/2023    Impaired mobility and ADLs 06/16/2023    Absence of toe of right foot 07/11/2023    Corns and callosity 07/11/2023    Disability of walking 07/11/2023    Fracture 07/11/2023    Limb swelling 07/11/2023    Polyneuropathy 07/11/2023    Pressure ulcer, stage 1 07/11/2023    Shortness of breath 07/11/2023    Generalized weakness 09/10/2023     Resolved Ambulatory Problems     Diagnosis Date Noted    Lactic acidosis 12/01/2021    Abscess of back 12/20/2021     Past Medical History:   Diagnosis Date    Anxiety and depression     Arthritis     Cancer     Chronic pain     Corns and callus     Difficulty walking     Hammertoe     Ingrown toenail     Obesity     Tinea unguium     Type 2 diabetes mellitus with polyneuropathy         Hospital Medications (active)         Dose Frequency Start End    !Patient Home Medications Stored in Pharmacy  2 Times Daily 4/17/2024 --    Admin Instructions: [facility] medications [facility] in [facility]. Immediately prior to discharge, contact pharmacy to ensure meds are sent home with patient.    Route: Does not apply    !Patient Home Medications Stored on Unit  2 Times Daily  2/21/2024 --    Admin Instructions: Patient has home medication(s) stored on the nursing unit. Please remove and give to patient before discharge.    Route: Does not apply    acetaminophen (TYLENOL) tablet 650 mg 650 mg Every 4 Hours PRN 3/18/2024 --    Admin Instructions: Based on patient request - if ordered for moderate or severe pain, provider allows for administration of a medication prescribed for a lower pain scale.    Do not exceed 4 grams of acetaminophen in a 24 hr period. Max dose of 2gm for AST/ALT greater than 120 units/L.    If given for pain, use the following pain scale:   Mild Pain = Pain Score of 1-3, CPOT 1-2  Moderate Pain = Pain Score of 4-6, CPOT 3-4  Severe Pain = Pain Score of 7-10, CPOT 5-8    Route: Oral    apixaban (ELIQUIS) tablet 5 mg 5 mg Every 12 Hours Scheduled 4/11/2024 --    Admin Instructions: .  Tablet may be crushed and suspended in 60 mL of water or D5W and immediately delivered via NG tube.    Route: Oral    atorvastatin (LIPITOR) tablet 10 mg 10 mg Nightly 4/10/2024 --    Admin Instructions: Avoid grapefruit juice.    Route: Oral    baclofen (LIORESAL) tablet 10 mg 10 mg 3 Times Daily PRN 5/18/2024 --    Admin Instructions: .  Take with food if GI upset occurs.    Route: Oral    benzonatate (TESSALON) capsule 100 mg 100 mg 3 Times Daily PRN 1/18/2024 --    Admin Instructions: Do not crush or chew the capsules or tablets. The drug may not work as designed if the capsule or tablet is crushed or chewed. Swallow whole.  Swallow whole.  Do not crush, chew, or open capsule.    Route: Oral    bismuth subsalicylate (PEPTO BISMOL) chewable tablet 524 mg 524 mg 3 Times Daily PRN 2/5/2024 --    Admin Instructions: Maximum 4192 mg per 24 hours.    Route: Oral    cetirizine (zyrTEC) tablet 10 mg 10 mg Daily 5/8/2024 --    Route: Oral    Cosign for Ordering: Accepted by Jose Maya MD on 5/11/2024  8:46 PM    cholestyramine (QUESTRAN) packet 1 packet 1 packet Every 12 Hours Scheduled  8/16/2024 --    Admin Instructions: Give with food.  Give other meds 1 hour before or 4 hours after cholestyramine.    Route: Oral    dextrose (D50W) (25 g/50 mL) IV injection 25 g 25 g Every 15 Minutes PRN 11/6/2023 --    Admin Instructions: .Blood sugar less than 70; patient has IV access - Unresponsive, NPO or Unable To Safely Swallow    Route: Intravenous    dextrose (GLUTOSE) oral gel 15 g 15 g Every 15 Minutes PRN 11/6/2023 --    Admin Instructions: .BS<70, Patient Alert, Is not NPO, Can safely swallow.    Route: Oral    Diclofenac Sodium (VOLTAREN) 1 % gel 4 g 4 g Every 6 Hours 5/27/2024 --    Admin Instructions: Apply to bilateral hip and knee   Do not cover area with occlusive dressing or apply any other med to affected area. Do not wash affected area for 1 hr after applying. Use dosing card for correct dose measurement.    Route: Topical    dimethicone (AVEENO) 1.3 % lotion 1 Application 1 Application User Specified 7/12/2024 --    Admin Instructions: Apply to BLE / feet.    Route: Topical    Cosign for Ordering: Accepted by Cailin Acosta MD on 7/11/2024  6:02 PM    empagliflozin (JARDIANCE) tablet 10 mg 10 mg Daily 11/7/2023 --    Admin Instructions: .    Route: Oral    ferrous sulfate tablet 325 mg 325 mg Daily With Breakfast 11/21/2023 --    Admin Instructions: .  Swallow whole. Do not crush, split, or chew. Take with food if GI upset occurs.    Route: Oral    Cosign for Ordering: Accepted by Max Mehta MD on 11/21/2023 11:34 AM    fluticasone (FLONASE) 50 MCG/ACT nasal spray 2 spray 2 spray Daily 8/4/2024 --    Route: Each Nare    Cosign for Ordering: Accepted by Jose Maya MD on 8/4/2024  6:19 PM    furosemide (LASIX) tablet 40 mg 40 mg 2 Times Daily (Diuretics) 7/19/2024 --    Admin Instructions: Hold for SBP less than 100, DBP less than 50.    Route: Oral    glucagon (GLUCAGEN) injection 1 mg 1 mg Every 15 Minutes PRN 11/6/2023 --    Admin Instructions: Blood Glucose Less Than  70 - Patient Without IV Access - Unresponsive, NPO or Unable To Safely Swallow.  Reconstitute powder for injection by adding 1 mL of -supplied sterile diluent or sterile water for injection to a vial containing 1 mg of the drug, to provide solutions containing 1 mg/mL. Shake vial gently to dissolve.    Route: Intramuscular    Hydrocortisone (Perianal) (ANUSOL-HC) 2.5 % rectal cream  2 Times Daily PRN 3/20/2024 --    Route: Rectal    Cosign for Ordering: Accepted by Cailin Acosta MD on 3/20/2024  4:51 PM    ibuprofen (ADVIL,MOTRIN) tablet 400 mg 400 mg 2 Times Daily PRN 1/16/2024 --    Admin Instructions: May alternate with tylenol  Based on patient request - if ordered for moderate or severe pain, provider allows for administration of a medication prescribed for a lower pain scale.    Mucous membrane irritant. Do not crush or chew tablet or capsule unless administered through a feeding tube.  If given for pain, use the following pain scale:  Mild Pain = Pain Score of 1-3, CPOT 1-2  Moderate Pain = Pain Score of 4-6, CPOT 3-4  Severe Pain = Pain Score of 7-10, CPOT 5-8    Route: Oral    Cosign for Ordering: Accepted by Max Mehta MD on 1/17/2024 10:44 AM    insulin glargine (LANTUS, SEMGLEE) injection 22 Units 22 Units Nightly 6/5/2024 --    Admin Instructions: Do not hold basal insulin without an order. Consider requesting a dose edit, if needed.      Route: Subcutaneous    Cosign for Ordering: Accepted by Jose Maya MD on 6/7/2024 10:19 AM    insulin glargine (LANTUS, SEMGLEE) injection 22 Units 22 Units Daily 7/5/2024 --    Admin Instructions: Do not hold basal insulin without an order. Consider requesting a dose edit, if needed.      Route: Subcutaneous    Cosign for Ordering: Accepted by Jose Maya MD on 7/5/2024  8:46 AM    Insulin Lispro (humaLOG) injection 3-14 Units 3-14 Units 4 Times Daily Before Meals & Nightly 5/19/2024 --    Admin Instructions: Correction Insulin -  "Moderate-High Dose (Total Insulin Dose 60-80 units/day, Patient Taking Insulin at Home)    Blood Glucose 150-199 mg/dL - 3 units  Blood Glucose 200-249 mg/dL - 5 units  Blood Glucose 250-299 mg/dL - 8 units  Blood Glucose 300-349 mg/dL - 10 units  Blood Glucose 350-400 mg/dL - 12 units  Blood Glucose Greater Than 400 mg/dL - 14 units & Call Provider   Caution: Look alike/sound alike drug alert    Route: Subcutaneous    Cosign for Ordering: Accepted by Cailin Acosta MD on 5/19/2024  3:01 PM    lisinopril (PRINIVIL,ZESTRIL) tablet 2.5 mg 2.5 mg Every 24 Hours Scheduled 11/7/2023 --    Admin Instructions: Hold for SBP less than 100.    Route: Oral    melatonin tablet 10 mg 10 mg Nightly 4/10/2024 --    Route: Oral    montelukast (SINGULAIR) tablet 10 mg 10 mg Nightly 5/23/2024 --    Route: Oral    ondansetron (ZOFRAN) injection 4 mg 4 mg Every 6 Hours PRN 11/6/2023 --    Admin Instructions: .If BOTH ondansetron (ZOFRAN) and promethazine (PHENERGAN) are ordered use ondansetron first and THEN promethazine IF ondansetron is ineffective.    Route: Intravenous    ondansetron ODT (ZOFRAN-ODT) disintegrating tablet 4 mg 4 mg Every 6 Hours PRN 11/6/2023 --    Admin Instructions: .\"If multiple N/V medications ordered, use in the following order: Ondansetron, Prochlorperazine, Promethazine. Use PO unless patient refuses or patient unable to swallow.\"  Place on tongue and allow to dissolve.    Route: Oral    oxyCODONE-acetaminophen (PERCOCET) 7.5-325 MG per tablet 1 tablet 1 tablet Every 6 Hours PRN 8/10/2024 8/23/2024    Admin Instructions: Based on patient request - if ordered for moderate or severe pain, provider allows for administration of a medication prescribed for a lower pain scale.  [NIKIA]    Do not exceed 4 grams of acetaminophen in a 24 hr period. Max dose of 2gm for AST/ALT greater than 120 units/L        If given for pain, use the following pain scale:   Mild Pain = Pain Score of 1-3, CPOT 1-2  Moderate Pain = " Pain Score of 4-6, CPOT 3-4  Severe Pain = Pain Score of 7-10, CPOT 5-8    Route: Oral    Pneumococcal 20-Janneth Conj Vacc (PREVNAR-20) vaccine 0.5 mL 0.5 mL During Hospitalization 4/10/2024 --    Route: Intramuscular    polyethyl glycol-propyl glycol (SYSTANE) 0.4-0.3 % ophthalmic solution (artificial tears) 1 drop 1 drop Every 1 Hour PRN 5/15/2024 --    Route: Both Eyes    Cosign for Ordering: Accepted by Cailin Acosta MD on 5/15/2024  2:47 PM    pregabalin (LYRICA) capsule 100 mg 100 mg 3 Times Daily 11/7/2023 --    Admin Instructions: .      Route: Oral    saccharomyces boulardii (FLORASTOR) capsule 500 mg 500 mg 2 Times Daily 8/18/2024 --    Route: Oral    Cosign for Ordering: Accepted by Jose Maya MD on 8/18/2024  2:37 PM    sertraline (ZOLOFT) tablet 50 mg 50 mg Nightly 2/18/2024 --    Route: Oral    simethicone (MYLICON) chewable tablet 80 mg 80 mg 4 Times Daily PRN 11/6/2023 --    Admin Instructions: .    Route: Oral    sodium chloride 0.9 % flush 10 mL 10 mL As Needed 11/6/2023 --    Route: Intravenous    sodium chloride 0.9 % infusion 40 mL 40 mL As Needed 11/6/2023 --    Admin Instructions: Following administration of an IV intermittent medication, flush line with 40mL NS at 100mL/hr.    Route: Intravenous    vitamin B-12 (CYANOCOBALAMIN) tablet 1,000 mcg 1,000 mcg Daily 11/7/2023 --    Admin Instructions: .    Route: Oral           Psychotropic Medications  pregabalin  Sertraline     Potentially Clinically Significant Medication Issues/PharmD Recommendations:   Psychotropic Medication: No issues   Opioid Use Review: No issues.   Medication without an appropriate indication: No issues.   Untreated indication: No issues.   Missing Duration: No issues.   Excessive or Inadequate Dose: No issues.   Ineffective Drug Therapy: No issues.   Medication Adverse Reactions/Consequences: No issues.   Medication Monitoring: Resident is taking atorvastatin, which requires monitoring that is not in place or  ordered.  Asked PA if he wanted a lipid panel ordered for tomorrow AM.  No reply yet.    Drug Interactions: Apixaban and ibuprofen.  Lasix and ibuprofen.  Ibuprofen and lisinopril.  MD acknowledged.   Duplicate Therapy: No issues.   PTA Medication Omissions (not currently ordered): No issues.   Safety: No issues.     Additional notes: Will order lipid panel for tomorrow AM if ok with PA.    In completing this Drug Regimen Review for [facility] Chad Reichmuth, have reviewed the electronic medical chart contents, including but not limited to the following: Medication Administration Records (MAR), Prescriber's orders, Progress, nursing and consultants' notes, Laboratory and diagnostic test results, Other sources of information about documented expressions or indications of distress and/or changes in condition.

## 2024-08-20 LAB
GLUCOSE BLDC GLUCOMTR-MCNC: 111 MG/DL (ref 70–99)
GLUCOSE BLDC GLUCOMTR-MCNC: 125 MG/DL (ref 70–99)
GLUCOSE BLDC GLUCOMTR-MCNC: 146 MG/DL (ref 70–99)
GLUCOSE BLDC GLUCOMTR-MCNC: 157 MG/DL (ref 70–99)

## 2024-08-20 PROCEDURE — 82948 REAGENT STRIP/BLOOD GLUCOSE: CPT

## 2024-08-20 PROCEDURE — 63710000001 INSULIN GLARGINE PER 5 UNITS: Performed by: PHYSICIAN ASSISTANT

## 2024-08-20 PROCEDURE — 63710000001 INSULIN LISPRO (HUMAN) PER 5 UNITS: Performed by: PHYSICIAN ASSISTANT

## 2024-08-20 RX ADMIN — ATORVASTATIN CALCIUM 10 MG: 10 TABLET, FILM COATED ORAL at 21:22

## 2024-08-20 RX ADMIN — BACLOFEN 10 MG: 10 TABLET ORAL at 09:15

## 2024-08-20 RX ADMIN — CETIRIZINE HYDROCHLORIDE 10 MG: 10 TABLET, FILM COATED ORAL at 09:15

## 2024-08-20 RX ADMIN — ACETAMINOPHEN 650 MG: 325 TABLET ORAL at 21:23

## 2024-08-20 RX ADMIN — INSULIN GLARGINE 22 UNITS: 100 INJECTION, SOLUTION SUBCUTANEOUS at 21:27

## 2024-08-20 RX ADMIN — FUROSEMIDE 40 MG: 40 TABLET ORAL at 09:15

## 2024-08-20 RX ADMIN — CYANOCOBALAMIN TAB 500 MCG 1000 MCG: 500 TAB at 09:14

## 2024-08-20 RX ADMIN — DICLOFENAC SODIUM 4 G: 10 GEL TOPICAL at 15:06

## 2024-08-20 RX ADMIN — PREGABALIN 100 MG: 100 CAPSULE ORAL at 21:23

## 2024-08-20 RX ADMIN — OXYCODONE HYDROCHLORIDE AND ACETAMINOPHEN 1 TABLET: 7.5; 325 TABLET ORAL at 01:00

## 2024-08-20 RX ADMIN — EMPAGLIFLOZIN 10 MG: 10 TABLET, FILM COATED ORAL at 09:15

## 2024-08-20 RX ADMIN — CHOLESTYRAMINE 1 PACKET: 4 POWDER, FOR SUSPENSION ORAL at 21:23

## 2024-08-20 RX ADMIN — APIXABAN 5 MG: 5 TABLET, FILM COATED ORAL at 21:22

## 2024-08-20 RX ADMIN — FERROUS SULFATE TAB 325 MG (65 MG ELEMENTAL FE) 325 MG: 325 (65 FE) TAB at 09:15

## 2024-08-20 RX ADMIN — DICLOFENAC SODIUM 4 G: 10 GEL TOPICAL at 09:16

## 2024-08-20 RX ADMIN — BACLOFEN 10 MG: 10 TABLET ORAL at 01:00

## 2024-08-20 RX ADMIN — SERTRALINE HYDROCHLORIDE 50 MG: 50 TABLET ORAL at 21:22

## 2024-08-20 RX ADMIN — FUROSEMIDE 40 MG: 40 TABLET ORAL at 17:03

## 2024-08-20 RX ADMIN — Medication 500 MG: at 09:15

## 2024-08-20 RX ADMIN — PREGABALIN 100 MG: 100 CAPSULE ORAL at 16:47

## 2024-08-20 RX ADMIN — OXYCODONE HYDROCHLORIDE AND ACETAMINOPHEN 1 TABLET: 7.5; 325 TABLET ORAL at 17:03

## 2024-08-20 RX ADMIN — IBUPROFEN 400 MG: 400 TABLET ORAL at 13:37

## 2024-08-20 RX ADMIN — INSULIN GLARGINE 22 UNITS: 100 INJECTION, SOLUTION SUBCUTANEOUS at 09:14

## 2024-08-20 RX ADMIN — INSULIN LISPRO 3 UNITS: 100 INJECTION, SOLUTION INTRAVENOUS; SUBCUTANEOUS at 21:23

## 2024-08-20 RX ADMIN — LISINOPRIL 2.5 MG: 2.5 TABLET ORAL at 09:15

## 2024-08-20 RX ADMIN — Medication 10 MG: at 21:22

## 2024-08-20 RX ADMIN — CHOLESTYRAMINE 1 PACKET: 4 POWDER, FOR SUSPENSION ORAL at 09:14

## 2024-08-20 RX ADMIN — DICLOFENAC SODIUM 4 G: 10 GEL TOPICAL at 21:23

## 2024-08-20 RX ADMIN — OXYCODONE HYDROCHLORIDE AND ACETAMINOPHEN 1 TABLET: 7.5; 325 TABLET ORAL at 09:15

## 2024-08-20 RX ADMIN — DICLOFENAC SODIUM 4 G: 10 GEL TOPICAL at 01:00

## 2024-08-20 RX ADMIN — BACLOFEN 10 MG: 10 TABLET ORAL at 17:03

## 2024-08-20 RX ADMIN — Medication 500 MG: at 21:22

## 2024-08-20 RX ADMIN — PREGABALIN 100 MG: 100 CAPSULE ORAL at 09:15

## 2024-08-20 RX ADMIN — APIXABAN 5 MG: 5 TABLET, FILM COATED ORAL at 09:15

## 2024-08-20 RX ADMIN — MONTELUKAST 10 MG: 10 TABLET, FILM COATED ORAL at 21:22

## 2024-08-20 NOTE — PLAN OF CARE
Goal Outcome Evaluation:  Plan of Care Reviewed With: patient        Progress: no change  Outcome Evaluation: Resident alert, oriented, able to make needs known to staff. Remains bedbound. continues to be incontinent of stool all day. Still very interested in getting medical marijuana card, has spent all afternoon searching on the internet. stated he found MD in [employer] that would give him card and stated that effective 1 january it is legal in stores. PA notified of interest in marijuana. informed resident that it is doubtful if hospital would allow or issue this, but let him know PA was notified. Resident stated what are they gonna do if I have a card, it is a doctors order and they can't do anything about it. Medicated for prn pain x2 with Baclofen and Percocet, x1 with Ibuprofen. Will cont. current plan of care.

## 2024-08-20 NOTE — PLAN OF CARE
Goal Outcome Evaluation:           Progress: no change  Outcome Evaluation: Rsd. is alert and oriented x4, able to make needs known to staff.  Rsd. remains bed bound and is a total assist.  Rsd. has slept intermittently in between bowel movements.  Medicated x1 with prn Tylenol, Ibuprofen, Baclofen, and oxycodone.  Continues on questran to bulk stools.  Refuses bed alarms.  Call light and personal items within reach, Rsd. reminded to use call light for assistance, verbalizes understanding.  Will continue to monitor and notify on-coming staff.  Current plan of care remains in place at this time.  Requesting to speak to MD today regarding CBD/medical marijuana.  Will have dayshift RN Justice reach out to dayshift hospitalist when they make rounds this am.  Rsd.  Continues to be incontinent of bowel and bladder.  Refuses to turn side to side but does shift weight occasionally with trapeze bar.  Rsd. Educated on importance of turning to prevent skin breakdown, verbalizes understanding but continues to refuse.

## 2024-08-21 LAB
GLUCOSE BLDC GLUCOMTR-MCNC: 111 MG/DL (ref 70–99)
GLUCOSE BLDC GLUCOMTR-MCNC: 122 MG/DL (ref 70–99)
GLUCOSE BLDC GLUCOMTR-MCNC: 138 MG/DL (ref 70–99)
GLUCOSE BLDC GLUCOMTR-MCNC: 182 MG/DL (ref 70–99)

## 2024-08-21 PROCEDURE — 63710000001 INSULIN LISPRO (HUMAN) PER 5 UNITS: Performed by: PHYSICIAN ASSISTANT

## 2024-08-21 PROCEDURE — 82948 REAGENT STRIP/BLOOD GLUCOSE: CPT

## 2024-08-21 PROCEDURE — 99309 SBSQ NF CARE MODERATE MDM 30: CPT | Performed by: INTERNAL MEDICINE

## 2024-08-21 PROCEDURE — 63710000001 INSULIN GLARGINE PER 5 UNITS: Performed by: PHYSICIAN ASSISTANT

## 2024-08-21 RX ADMIN — ACETAMINOPHEN 650 MG: 325 TABLET ORAL at 21:15

## 2024-08-21 RX ADMIN — CHOLESTYRAMINE 1 PACKET: 4 POWDER, FOR SUSPENSION ORAL at 09:12

## 2024-08-21 RX ADMIN — DICLOFENAC SODIUM 4 G: 10 GEL TOPICAL at 21:16

## 2024-08-21 RX ADMIN — LISINOPRIL 2.5 MG: 2.5 TABLET ORAL at 09:11

## 2024-08-21 RX ADMIN — INSULIN GLARGINE 22 UNITS: 100 INJECTION, SOLUTION SUBCUTANEOUS at 21:15

## 2024-08-21 RX ADMIN — CETIRIZINE HYDROCHLORIDE 10 MG: 10 TABLET, FILM COATED ORAL at 09:11

## 2024-08-21 RX ADMIN — BACLOFEN 10 MG: 10 TABLET ORAL at 17:06

## 2024-08-21 RX ADMIN — INSULIN LISPRO 3 UNITS: 100 INJECTION, SOLUTION INTRAVENOUS; SUBCUTANEOUS at 21:15

## 2024-08-21 RX ADMIN — INSULIN GLARGINE 22 UNITS: 100 INJECTION, SOLUTION SUBCUTANEOUS at 09:11

## 2024-08-21 RX ADMIN — BACLOFEN 10 MG: 10 TABLET ORAL at 09:11

## 2024-08-21 RX ADMIN — FUROSEMIDE 40 MG: 40 TABLET ORAL at 09:11

## 2024-08-21 RX ADMIN — DICLOFENAC SODIUM 4 G: 10 GEL TOPICAL at 02:02

## 2024-08-21 RX ADMIN — OXYCODONE HYDROCHLORIDE AND ACETAMINOPHEN 1 TABLET: 7.5; 325 TABLET ORAL at 09:11

## 2024-08-21 RX ADMIN — Medication 10 MG: at 21:16

## 2024-08-21 RX ADMIN — DICLOFENAC SODIUM 4 G: 10 GEL TOPICAL at 14:47

## 2024-08-21 RX ADMIN — IBUPROFEN 400 MG: 400 TABLET ORAL at 23:33

## 2024-08-21 RX ADMIN — BISMUTH SUBSALICYLATE 524 MG: 262 TABLET, CHEWABLE ORAL at 09:19

## 2024-08-21 RX ADMIN — ATORVASTATIN CALCIUM 10 MG: 10 TABLET, FILM COATED ORAL at 21:16

## 2024-08-21 RX ADMIN — OXYCODONE HYDROCHLORIDE AND ACETAMINOPHEN 1 TABLET: 7.5; 325 TABLET ORAL at 17:06

## 2024-08-21 RX ADMIN — DICLOFENAC SODIUM 4 G: 10 GEL TOPICAL at 09:11

## 2024-08-21 RX ADMIN — Medication 500 MG: at 21:15

## 2024-08-21 RX ADMIN — PREGABALIN 100 MG: 100 CAPSULE ORAL at 09:11

## 2024-08-21 RX ADMIN — ACETAMINOPHEN 650 MG: 325 TABLET ORAL at 14:46

## 2024-08-21 RX ADMIN — FERROUS SULFATE TAB 325 MG (65 MG ELEMENTAL FE) 325 MG: 325 (65 FE) TAB at 09:11

## 2024-08-21 RX ADMIN — FUROSEMIDE 40 MG: 40 TABLET ORAL at 17:06

## 2024-08-21 RX ADMIN — EMPAGLIFLOZIN 10 MG: 10 TABLET, FILM COATED ORAL at 09:11

## 2024-08-21 RX ADMIN — BACLOFEN 10 MG: 10 TABLET ORAL at 00:58

## 2024-08-21 RX ADMIN — APIXABAN 5 MG: 5 TABLET, FILM COATED ORAL at 09:11

## 2024-08-21 RX ADMIN — PREGABALIN 100 MG: 100 CAPSULE ORAL at 16:50

## 2024-08-21 RX ADMIN — CHOLESTYRAMINE 1 PACKET: 4 POWDER, FOR SUSPENSION ORAL at 21:15

## 2024-08-21 RX ADMIN — PREGABALIN 100 MG: 100 CAPSULE ORAL at 21:15

## 2024-08-21 RX ADMIN — Medication 500 MG: at 09:11

## 2024-08-21 RX ADMIN — ACETAMINOPHEN 650 MG: 325 TABLET ORAL at 04:45

## 2024-08-21 RX ADMIN — APIXABAN 5 MG: 5 TABLET, FILM COATED ORAL at 21:15

## 2024-08-21 RX ADMIN — CYANOCOBALAMIN TAB 500 MCG 1000 MCG: 500 TAB at 09:11

## 2024-08-21 RX ADMIN — IBUPROFEN 400 MG: 400 TABLET ORAL at 04:39

## 2024-08-21 RX ADMIN — SERTRALINE HYDROCHLORIDE 50 MG: 50 TABLET ORAL at 21:15

## 2024-08-21 RX ADMIN — MONTELUKAST 10 MG: 10 TABLET, FILM COATED ORAL at 21:15

## 2024-08-21 RX ADMIN — OXYCODONE HYDROCHLORIDE AND ACETAMINOPHEN 1 TABLET: 7.5; 325 TABLET ORAL at 00:58

## 2024-08-21 NOTE — SIGNIFICANT NOTE
"At approx. 2048 this nurse entered patient room to perform safety check and give Rsd. 2 cans of diet tanmay cola and a cup of ice he had requested.  While in room a commercial came on about knives and patient requested this nurse open his \"door dash bag\" in recliner which appeared to be a brown [employer] paper bag with a receipt inside, some seasonings for food, and a manilla mailing envelope.  Rsd. requested that this nurse bring him the manilla mailing envelope.  Rsd. Then opened the manilla mailing envelope and pulled out what appeared to be a silver knife in a black case.  Rsd. Then opened the knife, blade appeared to be over 4 inches In length.  Rsd. Then asked this nurse to place knife back in envelope, and place back in brown [employer] bag and put it where it was laying previously in the Recliner so no one could see it.  This nurse then made sure that patient was safe, knife was out of patients reach and then alerted security.   - Massiel Lawton RN was also notified at this time.   This RN then notified patient that security was coming to retrieve the knife that it was against hospital policy for it to be on campus.  Rsd. Became visibly agitated and started cursing stating this was \"a bunch of bullshit. You mean I can't even keep something like that in my room?  I can't even get out of this bed.\"  This nurse then educated patient that it was hospital policy, and protocol for the safety of Rsd., staff, patients and visitors.  Rsd. Stated he did not give them consent to come and retrieve his knife.  He then asked this nurse if it would be possible for security to lock knife in patent belongings safe located in his room until some friends or family members could arrive to take it home.  This nurse stated she would ask security, that she would remain present in his room when they arrived.  At approx. 2054 Security arrived to retrieve knife.   explained that retrieving the knife was hospital " policy, and that we were a weapon free campus.  Rsd. was agreeable to let security lock knife up downstairs in safe.  Paperwork signed and placed in chart.  Security also educated patient on procedures that would need to be followed for patients family or friends to retrieve knife. Rsd. Verbalized understanding. Rsd, then told  that he wasn't even aware that the knife was in the envelope.  This RN then asked patient if he had plans to harm self, staff or anyone else.  Rsd. Denied.   This RN then made sure Rsd. was safe, free of harm and notified Unit Manager Uyen Perez.  MD notified per protocol.

## 2024-08-21 NOTE — SIGNIFICANT NOTE
"Spoke to resident today to review behavioral contract with security present. Explained to resident that we have seen an increase in agitation with him and that he has started to make staff feel uncomfortable in regards to his care. Resident stated \"Well, wouldn't you be frustrated too when you need surgery and no one will do it\". I sympathized with resident and explained that I can see where his frustrations are coming from, but that all staff have gone above and beyond in his care by restarting therapy for him to try and regain his strength, aiding him in his weight loss journey, as well as setting up a second opinion from orthopedic surgeon at [facility], and that we are just trying to help him and that staff feel as though he is taking out his frustrations on them. We continued with the behavioral contract and specifically that he has been making rude and demeaning comments towards staff as well as used aggressive language. We also discussed him refusing care from PCA's and only letting certain ones bath him. That these staff members are starting to feel uncomfortable because he is seeking them out to do specific care-particularly yonis-care. We also talked at length about the safety of both the staff and himself because a large knife was found in his room. Security did come up and took the knife to place in their safe. But staff is concerned for their safety. Patient was agreeable to sign behavioral contract.  "

## 2024-08-21 NOTE — PROGRESS NOTES
[facility]   Hospitalist Progress Note  Date: 2024  Patient Name: Jose Shaikh  : 1960  MRN: [id number]  Date of admission: 2023      Subjective   Subjective     Chief Complaint: Weakness    Summary: Jose Shaikh is a 63 y.o. male  initially hospitalized on 9/10/2023, prolonged hospitalization for treatment of management of generalized weakness, deconditioning, with acute issues of diarrhea, C. difficile negative.  Diarrhea improved.  Had small hematoma after ambulating on his foot.  Unfortunately with exhaustive efforts to try to place this gentleman after 39 days of hospitalization, we are unable to find a facility that will accept him.  He has no further acute needs or requirements for inpatient monitoring and management, his labs and vitals are stable, he is tolerating oral intake, and has been refusing turns and repositionings and other interventions with nursing staff despite recommendations.  Patient discharged in hemodynamically stable condition on 10/19/2023 to follow-up with PCP within 1 week. Unfortunately we cannot solve all of his social issues during this hospitalization due to lack of resources and participation from the patient's perspective despite all our efforts.  Patient has a financial means of making arrangements at home.  Patient appealed his discharge.  Lost his appeal.  LCD: 10/20/23, PFL beginning: 10/21/23 @ 12pm.  Due to an inability to place the patient in a.m. nursing home he has been placed in our skilled nursing facility until arrangements can be made or until he is able to care for himself.      Interval Followup: Over the last 48 hours a knife was found in patient's positions, I did discuss this with the patient as there was some concerns that patient may want to harm himself, patient adamantly denies any suicidal ideation.  No homicidal ideation.  Patient states he is a knife  and had this delivered to his house.  The person that brings him his  male inadvertently brought this to the hospital, he did not realize it was in the packages.  Patient states that several days later he opened the package and realized it was a knife.  He placed it back in the envelope and had it placed back in his positions, he did not tell anybody at that time that it was present.  Patient states that in discussions with staff overnight they were discussing knives and he recommended that she buy a knife similar to the one he had in his position for a family member.  He then instructed her where the knife was.  This was reported to security and the knife was confiscated.    Objective   Objective     Vitals:   Temp:  [97.7 °F (36.5 °C)-98.1 °F (36.7 °C)] 97.7 °F (36.5 °C)  Heart Rate:  [77-91] 77  Resp:  [18] 18  BP: (110-123)/(51-66) 123/51    Physical Exam   GEN: No acute distress  HEENT: Moist mucous membranes  LUNGS: Equal chest rise bilaterally  CARDIAC: Regular rate and rhythm  NEURO: Moving all 4 extremities spontaneously  SKIN: No obvious breakdown    Result Review    Result Review:  I have personally reviewed the results from the time of this admission to 8/21/2024 15:16 EDT and agree with these findings:  []  Laboratory  []  Microbiology  []  Radiology  []  EKG/Telemetry   []  Cardiology/Vascular   []  Pathology  []  Old records  []  Other:    Assessment & Plan   Assessment / Plan   Assessment:  Hospital-acquired weakness  Noncompliance  Hx of Influenza A  Hx of C. difficile diarrhea treated  Generalized weakness  Deconditioning  DM (Kami Garcia and PCP)  HTN  PAF (on Eliquis; previously seen Dr. Duval)  Morbid obesity with BMI 44  Debility with wheelchair dependence  Hx of severe left hip pain  Severe degenerative joint disease of lower extremities  Hx L calcaneus osteomyelitis  Chronic venous stasis  Hx R transmetatarsal amputation  Chronic bilateral foot wounds with right transmetatarsal amputation, healing by secondary intention (wound care clinic; podiatrist  Gordon)  Thrombocytopenia  Conjunctivitis'  Diarrhea, possibly from Ozempic      Plan:  C diff studies consistent with colonization; d/w ID.   Suspect diarrhea is due to GI intolerance of Ozempic  Continue Questran  Stool cultures negative  Patient refuses to be weighed. His last weight was on 8/5 - his weight had increased slightly from May.  Patient was taken off of Ozempic last week  As patient is not suicidal or homicidal he does not need one-to-one sitter  Continue current treatment plan    DVT prophylaxis:  Pharmacologic VTE prophylaxis orders are present.    CODE STATUS:   Code Status (Patient has no pulse and is not breathing): CPR (Attempt to Resuscitate)  Medical Interventions (Patient has pulse or is breathing): Full Support    Time spent: Time spent involving patient care including face-to-face encounter 35 minutes    Electronically signed by Max Mehta MD, 8/21/2024, 15:19 EDT.

## 2024-08-21 NOTE — PLAN OF CARE
Goal Outcome Evaluation:  Plan of Care Reviewed With: patient        Progress: no change  Outcome Evaluation: Rsd. is alert and oriented x4, able to make needs known to staff.  Rsd. continues to remain bedbound and is incontinent of bowel and bladder at times, urinal at bedside.  Rsd. has been medicated x1 with prn tylenol, baclofen, oxycodone, and Ibuprofen.  See emar.  Rsd. currently resting with eyes closed.  At times Rsd. has been agitated this shift and verbally abusive towards staff, suspicious and untrusting.  See previous nursing notes and significant notes charted by this RN.  See behavoral documentation.  MD ordered safety sitter remains at bedside.  Call light and personal items within reach.  Rsd. reminded to use call light for assistance, verbalizes understanding.  Will continue to monitor and notify on-coming staff.  Current plan of care remains in place at this time.

## 2024-08-21 NOTE — PLAN OF CARE
Goal Outcome Evaluation:  Plan of Care Reviewed With: patient        Progress: no change  Outcome Evaluation: Resident alert, oriented, able to make needs known to staff. Res remains bedbound. Refused weight today, stated would get weight in the morning before BF. Medicated for prn pain x2 with Baclofen and percocet, x1 with Ibuprofen, effective, but short lived per resident. Remains incontinent of stool. Uses urinal at jace independently. Very courtious today. Blood glucose stable for all meals. Resident pleasant and cooperative.

## 2024-08-22 LAB
GLUCOSE BLDC GLUCOMTR-MCNC: 109 MG/DL (ref 70–99)
GLUCOSE BLDC GLUCOMTR-MCNC: 134 MG/DL (ref 70–99)
GLUCOSE BLDC GLUCOMTR-MCNC: 139 MG/DL (ref 70–99)
GLUCOSE BLDC GLUCOMTR-MCNC: 92 MG/DL (ref 70–99)
SARS-COV-2 RNA RESP QL NAA+PROBE: NOT DETECTED

## 2024-08-22 PROCEDURE — 82948 REAGENT STRIP/BLOOD GLUCOSE: CPT

## 2024-08-22 PROCEDURE — 87635 SARS-COV-2 COVID-19 AMP PRB: CPT | Performed by: PHYSICIAN ASSISTANT

## 2024-08-22 PROCEDURE — 63710000001 ONDANSETRON ODT 4 MG TABLET DISPERSIBLE: Performed by: PHYSICIAN ASSISTANT

## 2024-08-22 PROCEDURE — 63710000001 INSULIN GLARGINE PER 5 UNITS: Performed by: PHYSICIAN ASSISTANT

## 2024-08-22 RX ADMIN — IBUPROFEN 400 MG: 400 TABLET ORAL at 21:11

## 2024-08-22 RX ADMIN — OXYCODONE HYDROCHLORIDE AND ACETAMINOPHEN 1 TABLET: 7.5; 325 TABLET ORAL at 01:12

## 2024-08-22 RX ADMIN — INSULIN GLARGINE 22 UNITS: 100 INJECTION, SOLUTION SUBCUTANEOUS at 21:16

## 2024-08-22 RX ADMIN — INSULIN GLARGINE 22 UNITS: 100 INJECTION, SOLUTION SUBCUTANEOUS at 09:14

## 2024-08-22 RX ADMIN — PREGABALIN 100 MG: 100 CAPSULE ORAL at 09:14

## 2024-08-22 RX ADMIN — CHOLESTYRAMINE 1 PACKET: 4 POWDER, FOR SUSPENSION ORAL at 21:12

## 2024-08-22 RX ADMIN — FUROSEMIDE 40 MG: 40 TABLET ORAL at 09:15

## 2024-08-22 RX ADMIN — Medication 1 APPLICATION: at 15:27

## 2024-08-22 RX ADMIN — Medication 500 MG: at 09:14

## 2024-08-22 RX ADMIN — ONDANSETRON 4 MG: 4 TABLET, ORALLY DISINTEGRATING ORAL at 11:34

## 2024-08-22 RX ADMIN — DICLOFENAC SODIUM 4 G: 10 GEL TOPICAL at 14:14

## 2024-08-22 RX ADMIN — MONTELUKAST 10 MG: 10 TABLET, FILM COATED ORAL at 21:10

## 2024-08-22 RX ADMIN — CETIRIZINE HYDROCHLORIDE 10 MG: 10 TABLET, FILM COATED ORAL at 09:14

## 2024-08-22 RX ADMIN — CHOLESTYRAMINE 1 PACKET: 4 POWDER, FOR SUSPENSION ORAL at 09:16

## 2024-08-22 RX ADMIN — FERROUS SULFATE TAB 325 MG (65 MG ELEMENTAL FE) 325 MG: 325 (65 FE) TAB at 09:15

## 2024-08-22 RX ADMIN — DICLOFENAC SODIUM 4 G: 10 GEL TOPICAL at 07:54

## 2024-08-22 RX ADMIN — BACLOFEN 10 MG: 10 TABLET ORAL at 01:12

## 2024-08-22 RX ADMIN — SERTRALINE HYDROCHLORIDE 50 MG: 50 TABLET ORAL at 21:10

## 2024-08-22 RX ADMIN — DICLOFENAC SODIUM 4 G: 10 GEL TOPICAL at 01:12

## 2024-08-22 RX ADMIN — APIXABAN 5 MG: 5 TABLET, FILM COATED ORAL at 09:14

## 2024-08-22 RX ADMIN — CYANOCOBALAMIN TAB 500 MCG 1000 MCG: 500 TAB at 09:15

## 2024-08-22 RX ADMIN — PREGABALIN 100 MG: 100 CAPSULE ORAL at 16:54

## 2024-08-22 RX ADMIN — BACLOFEN 10 MG: 10 TABLET ORAL at 17:14

## 2024-08-22 RX ADMIN — DICLOFENAC SODIUM 4 G: 10 GEL TOPICAL at 21:18

## 2024-08-22 RX ADMIN — Medication 10 MG: at 21:12

## 2024-08-22 RX ADMIN — OXYCODONE HYDROCHLORIDE AND ACETAMINOPHEN 1 TABLET: 7.5; 325 TABLET ORAL at 17:15

## 2024-08-22 RX ADMIN — LISINOPRIL 2.5 MG: 2.5 TABLET ORAL at 09:15

## 2024-08-22 RX ADMIN — FUROSEMIDE 40 MG: 40 TABLET ORAL at 17:14

## 2024-08-22 RX ADMIN — PREGABALIN 100 MG: 100 CAPSULE ORAL at 21:09

## 2024-08-22 RX ADMIN — Medication 500 MG: at 21:09

## 2024-08-22 RX ADMIN — BACLOFEN 10 MG: 10 TABLET ORAL at 09:16

## 2024-08-22 RX ADMIN — OXYCODONE HYDROCHLORIDE AND ACETAMINOPHEN 1 TABLET: 7.5; 325 TABLET ORAL at 09:14

## 2024-08-22 RX ADMIN — ATORVASTATIN CALCIUM 10 MG: 10 TABLET, FILM COATED ORAL at 21:10

## 2024-08-22 RX ADMIN — APIXABAN 5 MG: 5 TABLET, FILM COATED ORAL at 21:09

## 2024-08-22 RX ADMIN — EMPAGLIFLOZIN 10 MG: 10 TABLET, FILM COATED ORAL at 09:15

## 2024-08-22 NOTE — PLAN OF CARE
Goal Outcome Evaluation:  Plan of Care Reviewed With: patient        Progress: no change  Outcome Evaluation: Rsd. is alert and oriented x4, able to make needs known to staff.  Rsd. has been less agitated this shift, see documentation on behaviors.  Rsd. continues to refuse turns, refuses I.S., and refuses to get out of bed this shift.  Rsd. is total assist in bed.  Rsd. medicated this shift x1 with po baclofen, oxycodone, tylenol, and ibuprofen.  Rsd. states medications effective and has napped in between care.  Call light and personal items within reach.  Rsd. reminded to use call light for assistance, verbalizes understanding.  Rsd. continues to be incontinent of bowel and bladder at times, urinal remains at bedside.  Current plan of care remains in place at this time.

## 2024-08-22 NOTE — PLAN OF CARE
Goal Outcome Evaluation:  Plan of Care Reviewed With: patient        Progress: no change  Outcome Evaluation: Patient is alert and oriented x4. C/O severe left hip pain wtih spasms at 0916 and again at 1714 and was given PRN baclofen and percocet at each occurance. Meds effective. Given Zofran ODT at 1134 for c/o nausea without emesis. Med effective. Refused bath offered x3 and oral care this shift. Refused scheduled Flonase at 0900. Did allow nurse to use bath wipes on BLE and to apply scheduled Aveeno this afternoon. Voices needs. Con't current POC.

## 2024-08-22 NOTE — NURSING NOTE
"CNA reported patient is acting hateful to staff. Also was making fun of weight of security staff that came to collect the knife that was found in patient's room. Stated \"I'm surprised he could even fit in his uniform\".  "

## 2024-08-22 NOTE — NURSING NOTE
"Upon entrance to room Rsd. Noted to be in supine position in hospital bed, in high fowlers position playing games on cell phone.  Rsd. Notified that MD had order safety sitter to bedside.  Patient became visibly agitated, and started cursing.  Stated \"what do they think I'm going to do?  I can't even get out of bed.  I don't want to hurt myself or any of Y'all. Tell whatever cock sucker or idiot that they have to sit in the neff, and they better let me keep my light off.\"  Rsd. Demanded to know which doctor ordered a safety sitter.  This RN informed patient that it was a night shift hospitalist named Dr. Crisostomo.  Rsd. Stated \"well isnt this a bunch of bullshit.\"  This RN educated patient that it was a safety precaution.  Rsd. verbalized understanding.  Asked this RN if it was because he was on an Antidepressant.  This nurse again reinforced  and educated patient that it was a safety precaution and that his safety, patient safety and staff safety was priority.  Rsd. Verbalized understanding.    "

## 2024-08-23 LAB
ANION GAP SERPL CALCULATED.3IONS-SCNC: 7.8 MMOL/L (ref 5–15)
BACTERIA SPEC CULT: NORMAL
BACTERIA SPEC CULT: NORMAL
BUN SERPL-MCNC: 16 MG/DL (ref 8–23)
BUN/CREAT SERPL: 23.5 (ref 7–25)
CALCIUM SPEC-SCNC: 8.1 MG/DL (ref 8.6–10.5)
CAMPYLOBACTER STL CULT: NORMAL
CHLORIDE SERPL-SCNC: 107 MMOL/L (ref 98–107)
CO2 SERPL-SCNC: 22.2 MMOL/L (ref 22–29)
CREAT SERPL-MCNC: 0.68 MG/DL (ref 0.76–1.27)
E COLI SXT STL QL IA: NEGATIVE
EGFRCR SERPLBLD CKD-EPI 2021: 103.2 ML/MIN/1.73
GLUCOSE BLDC GLUCOMTR-MCNC: 128 MG/DL (ref 70–99)
GLUCOSE BLDC GLUCOMTR-MCNC: 144 MG/DL (ref 70–99)
GLUCOSE BLDC GLUCOMTR-MCNC: 150 MG/DL (ref 70–99)
GLUCOSE BLDC GLUCOMTR-MCNC: 154 MG/DL (ref 70–99)
GLUCOSE SERPL-MCNC: 95 MG/DL (ref 65–99)
NT-PROBNP SERPL-MCNC: 88.5 PG/ML (ref 0–900)
POTASSIUM SERPL-SCNC: 4.2 MMOL/L (ref 3.5–5.2)
SALM + SHIG STL CULT: NORMAL
SODIUM SERPL-SCNC: 137 MMOL/L (ref 136–145)
TSH SERPL DL<=0.05 MIU/L-ACNC: 2.05 UIU/ML (ref 0.27–4.2)
WHOLE BLOOD HOLD SPECIMEN: NORMAL

## 2024-08-23 PROCEDURE — 63710000001 INSULIN LISPRO (HUMAN) PER 5 UNITS: Performed by: PHYSICIAN ASSISTANT

## 2024-08-23 PROCEDURE — 84443 ASSAY THYROID STIM HORMONE: CPT | Performed by: PHYSICIAN ASSISTANT

## 2024-08-23 PROCEDURE — 83880 ASSAY OF NATRIURETIC PEPTIDE: CPT | Performed by: PHYSICIAN ASSISTANT

## 2024-08-23 PROCEDURE — 80048 BASIC METABOLIC PNL TOTAL CA: CPT | Performed by: PHYSICIAN ASSISTANT

## 2024-08-23 PROCEDURE — 63710000001 INSULIN GLARGINE PER 5 UNITS: Performed by: PHYSICIAN ASSISTANT

## 2024-08-23 PROCEDURE — 82948 REAGENT STRIP/BLOOD GLUCOSE: CPT

## 2024-08-23 RX ADMIN — PREGABALIN 100 MG: 100 CAPSULE ORAL at 15:22

## 2024-08-23 RX ADMIN — OXYCODONE HYDROCHLORIDE AND ACETAMINOPHEN 1 TABLET: 7.5; 325 TABLET ORAL at 01:12

## 2024-08-23 RX ADMIN — INSULIN LISPRO 3 UNITS: 100 INJECTION, SOLUTION INTRAVENOUS; SUBCUTANEOUS at 21:13

## 2024-08-23 RX ADMIN — BACLOFEN 10 MG: 10 TABLET ORAL at 09:18

## 2024-08-23 RX ADMIN — BACLOFEN 10 MG: 10 TABLET ORAL at 21:12

## 2024-08-23 RX ADMIN — APIXABAN 5 MG: 5 TABLET, FILM COATED ORAL at 08:19

## 2024-08-23 RX ADMIN — PREGABALIN 100 MG: 100 CAPSULE ORAL at 21:13

## 2024-08-23 RX ADMIN — INSULIN GLARGINE 22 UNITS: 100 INJECTION, SOLUTION SUBCUTANEOUS at 08:19

## 2024-08-23 RX ADMIN — INSULIN LISPRO 3 UNITS: 100 INJECTION, SOLUTION INTRAVENOUS; SUBCUTANEOUS at 08:19

## 2024-08-23 RX ADMIN — SERTRALINE HYDROCHLORIDE 50 MG: 50 TABLET ORAL at 21:13

## 2024-08-23 RX ADMIN — PREGABALIN 100 MG: 100 CAPSULE ORAL at 08:20

## 2024-08-23 RX ADMIN — DICLOFENAC SODIUM 4 G: 10 GEL TOPICAL at 21:12

## 2024-08-23 RX ADMIN — CETIRIZINE HYDROCHLORIDE 10 MG: 10 TABLET, FILM COATED ORAL at 08:18

## 2024-08-23 RX ADMIN — DICLOFENAC SODIUM 4 G: 10 GEL TOPICAL at 08:20

## 2024-08-23 RX ADMIN — DICLOFENAC SODIUM 4 G: 10 GEL TOPICAL at 01:13

## 2024-08-23 RX ADMIN — CHOLESTYRAMINE 1 PACKET: 4 POWDER, FOR SUSPENSION ORAL at 08:18

## 2024-08-23 RX ADMIN — OXYCODONE HYDROCHLORIDE AND ACETAMINOPHEN 1 TABLET: 7.5; 325 TABLET ORAL at 21:12

## 2024-08-23 RX ADMIN — OXYCODONE HYDROCHLORIDE AND ACETAMINOPHEN 1 TABLET: 7.5; 325 TABLET ORAL at 09:18

## 2024-08-23 RX ADMIN — BACLOFEN 10 MG: 10 TABLET ORAL at 01:12

## 2024-08-23 RX ADMIN — APIXABAN 5 MG: 5 TABLET, FILM COATED ORAL at 21:12

## 2024-08-23 RX ADMIN — MONTELUKAST 10 MG: 10 TABLET, FILM COATED ORAL at 21:12

## 2024-08-23 RX ADMIN — ATORVASTATIN CALCIUM 10 MG: 10 TABLET, FILM COATED ORAL at 21:12

## 2024-08-23 RX ADMIN — DICLOFENAC SODIUM 4 G: 10 GEL TOPICAL at 15:22

## 2024-08-23 RX ADMIN — Medication 500 MG: at 21:12

## 2024-08-23 RX ADMIN — FUROSEMIDE 40 MG: 40 TABLET ORAL at 08:18

## 2024-08-23 RX ADMIN — Medication 10 MG: at 21:12

## 2024-08-23 RX ADMIN — CYANOCOBALAMIN TAB 500 MCG 1000 MCG: 500 TAB at 08:19

## 2024-08-23 RX ADMIN — EMPAGLIFLOZIN 10 MG: 10 TABLET, FILM COATED ORAL at 08:19

## 2024-08-23 RX ADMIN — CHOLESTYRAMINE 1 PACKET: 4 POWDER, FOR SUSPENSION ORAL at 21:13

## 2024-08-23 RX ADMIN — Medication 500 MG: at 08:19

## 2024-08-23 RX ADMIN — FUROSEMIDE 40 MG: 40 TABLET ORAL at 17:45

## 2024-08-23 RX ADMIN — INSULIN GLARGINE 22 UNITS: 100 INJECTION, SOLUTION SUBCUTANEOUS at 21:13

## 2024-08-23 RX ADMIN — FERROUS SULFATE TAB 325 MG (65 MG ELEMENTAL FE) 325 MG: 325 (65 FE) TAB at 08:18

## 2024-08-23 NOTE — PLAN OF CARE
Goal Outcome Evaluation:  Plan of Care Reviewed With: patient        Progress: improving  Outcome Evaluation: No significant changes noted. Vitals are stable. He continues to have loose BM's. Pt did c/o pain x3 and was medicated. Blood sugar [id number] and he had snacks throughout the night. He  made rude comments about one of the physicians. Will continue with his plan of care. Call light and personal items within reach.

## 2024-08-23 NOTE — PLAN OF CARE
"Goal Outcome Evaluation:  Plan of Care Reviewed With: patient        Progress: no change  Outcome Evaluation: PT is AAOx4, VSS, BP soft this morning, see flow sheet, remains on SSI with insulin given as ordered, continues with loose BMs, incontinent of bowel and bladder intermittently, will not always call out for the bedpan, reinforced appropriate behavior and treatment of staff, he was reminded to treat staff with respect and not \"demand\" things from them, the manner in which he speaks to staff, especially the CNAs is demanding, at times hostile and disrespectful, frequently seeks out staff and asks for pain medication, education was provided to patient about getting out of bed to BSC, sitting up in chair, diet compliance, patient has been reminded about limiting door dash food that is not in line with his diet order, patient reports that \"he is a grown damn man and will do and eat whatever he wants too.\", patient shows no interest in participating in his treatment plan, bed in low/locked position, alarms refused, education provided regarding falls risk, call light and personal items within reach, continue with current POC at this time.    PT door dashed Sonic loaded hamburger for dinner today, along with his ordered hospital diet.                               "

## 2024-08-24 LAB
GLUCOSE BLDC GLUCOMTR-MCNC: 102 MG/DL (ref 70–99)
GLUCOSE BLDC GLUCOMTR-MCNC: 118 MG/DL (ref 70–99)
GLUCOSE BLDC GLUCOMTR-MCNC: 119 MG/DL (ref 70–99)
GLUCOSE BLDC GLUCOMTR-MCNC: 156 MG/DL (ref 70–99)

## 2024-08-24 PROCEDURE — 63710000001 INSULIN GLARGINE PER 5 UNITS: Performed by: PHYSICIAN ASSISTANT

## 2024-08-24 PROCEDURE — 82948 REAGENT STRIP/BLOOD GLUCOSE: CPT

## 2024-08-24 PROCEDURE — 63710000001 INSULIN LISPRO (HUMAN) PER 5 UNITS: Performed by: PHYSICIAN ASSISTANT

## 2024-08-24 RX ADMIN — PREGABALIN 100 MG: 100 CAPSULE ORAL at 08:21

## 2024-08-24 RX ADMIN — PREGABALIN 100 MG: 100 CAPSULE ORAL at 15:47

## 2024-08-24 RX ADMIN — PREGABALIN 100 MG: 100 CAPSULE ORAL at 21:19

## 2024-08-24 RX ADMIN — BISMUTH SUBSALICYLATE 524 MG: 262 TABLET, CHEWABLE ORAL at 18:05

## 2024-08-24 RX ADMIN — CETIRIZINE HYDROCHLORIDE 10 MG: 10 TABLET, FILM COATED ORAL at 08:21

## 2024-08-24 RX ADMIN — SERTRALINE HYDROCHLORIDE 50 MG: 50 TABLET ORAL at 21:19

## 2024-08-24 RX ADMIN — EMPAGLIFLOZIN 10 MG: 10 TABLET, FILM COATED ORAL at 08:22

## 2024-08-24 RX ADMIN — DICLOFENAC SODIUM 4 G: 10 GEL TOPICAL at 13:46

## 2024-08-24 RX ADMIN — BACLOFEN 10 MG: 10 TABLET ORAL at 17:56

## 2024-08-24 RX ADMIN — INSULIN GLARGINE 22 UNITS: 100 INJECTION, SOLUTION SUBCUTANEOUS at 08:22

## 2024-08-24 RX ADMIN — INSULIN GLARGINE 22 UNITS: 100 INJECTION, SOLUTION SUBCUTANEOUS at 21:20

## 2024-08-24 RX ADMIN — Medication 500 MG: at 21:19

## 2024-08-24 RX ADMIN — FUROSEMIDE 40 MG: 40 TABLET ORAL at 17:56

## 2024-08-24 RX ADMIN — DICLOFENAC SODIUM 4 G: 10 GEL TOPICAL at 08:21

## 2024-08-24 RX ADMIN — MONTELUKAST 10 MG: 10 TABLET, FILM COATED ORAL at 21:19

## 2024-08-24 RX ADMIN — APIXABAN 5 MG: 5 TABLET, FILM COATED ORAL at 08:21

## 2024-08-24 RX ADMIN — FUROSEMIDE 40 MG: 40 TABLET ORAL at 08:21

## 2024-08-24 RX ADMIN — APIXABAN 5 MG: 5 TABLET, FILM COATED ORAL at 21:19

## 2024-08-24 RX ADMIN — INSULIN LISPRO 3 UNITS: 100 INJECTION, SOLUTION INTRAVENOUS; SUBCUTANEOUS at 21:20

## 2024-08-24 RX ADMIN — CYANOCOBALAMIN TAB 500 MCG 1000 MCG: 500 TAB at 08:22

## 2024-08-24 RX ADMIN — CHOLESTYRAMINE 1 PACKET: 4 POWDER, FOR SUSPENSION ORAL at 21:20

## 2024-08-24 RX ADMIN — CHOLESTYRAMINE 1 PACKET: 4 POWDER, FOR SUSPENSION ORAL at 08:22

## 2024-08-24 RX ADMIN — LISINOPRIL 2.5 MG: 2.5 TABLET ORAL at 08:21

## 2024-08-24 RX ADMIN — OXYCODONE HYDROCHLORIDE AND ACETAMINOPHEN 1 TABLET: 7.5; 325 TABLET ORAL at 17:56

## 2024-08-24 RX ADMIN — ATORVASTATIN CALCIUM 10 MG: 10 TABLET, FILM COATED ORAL at 21:19

## 2024-08-24 RX ADMIN — BISMUTH SUBSALICYLATE 524 MG: 262 TABLET, CHEWABLE ORAL at 04:53

## 2024-08-24 RX ADMIN — Medication 1 APPLICATION: at 15:00

## 2024-08-24 RX ADMIN — IBUPROFEN 400 MG: 400 TABLET ORAL at 06:46

## 2024-08-24 RX ADMIN — DICLOFENAC SODIUM 4 G: 10 GEL TOPICAL at 02:20

## 2024-08-24 RX ADMIN — OXYCODONE HYDROCHLORIDE AND ACETAMINOPHEN 1 TABLET: 7.5; 325 TABLET ORAL at 09:39

## 2024-08-24 RX ADMIN — Medication 500 MG: at 08:21

## 2024-08-24 RX ADMIN — Medication 10 MG: at 21:19

## 2024-08-24 RX ADMIN — FERROUS SULFATE TAB 325 MG (65 MG ELEMENTAL FE) 325 MG: 325 (65 FE) TAB at 08:21

## 2024-08-24 RX ADMIN — BACLOFEN 10 MG: 10 TABLET ORAL at 09:39

## 2024-08-24 RX ADMIN — DICLOFENAC SODIUM 4 G: 10 GEL TOPICAL at 21:24

## 2024-08-24 NOTE — PLAN OF CARE
Goal Outcome Evaluation:  Plan of Care Reviewed With: patient        Progress: no change  Outcome Evaluation: Resident is alert and oriented x4. Able to make needs known to staff. Blood sugar 150 at HS. SSI and long acting insulin given per MAR. Two ice creams requested for snack at bedtime. Diet education reinforced. Patient still ate both ice creams. Multiple episodes of loose bowel incontinence this shift, with one large formed BM. Pain 10/10 and PRN Baclofen and Percocet given at 2112 with relief per patient behaviors of playing on phone quietly. Alarms continue to be refused. Pepto requested x1 for indigestion. Call light and personal items within reach. Continue with current plan of care.

## 2024-08-24 NOTE — NURSING NOTE
Patient given Baclofen and Percocet at 1756 and Pepto bismol at 1805 per request. Pain meds effective. Pepto not yet effective.

## 2024-08-24 NOTE — PLAN OF CARE
Goal Outcome Evaluation:  Plan of Care Reviewed With: patient        Progress: no change  Outcome Evaluation: Patient is alert and oriented x4. Given PRN Percocet and Baclofen at 0939 for c/o severe left hip pain as well as lower back pain and left shoulder soreness. Meds effective. Con't to have frequent soft to loose bowel movements this shift. Barrier cream applied to skin after each occurance. Skin to buttocks/perineum remains intact, red and blanchable. Voices needs. Con't current POC.

## 2024-08-25 LAB
GLUCOSE BLDC GLUCOMTR-MCNC: 120 MG/DL (ref 70–99)
GLUCOSE BLDC GLUCOMTR-MCNC: 134 MG/DL (ref 70–99)
GLUCOSE BLDC GLUCOMTR-MCNC: 173 MG/DL (ref 70–99)
GLUCOSE BLDC GLUCOMTR-MCNC: 194 MG/DL (ref 70–99)
GLUCOSE BLDC GLUCOMTR-MCNC: 95 MG/DL (ref 70–99)

## 2024-08-25 PROCEDURE — 82948 REAGENT STRIP/BLOOD GLUCOSE: CPT

## 2024-08-25 PROCEDURE — 63710000001 INSULIN LISPRO (HUMAN) PER 5 UNITS: Performed by: PHYSICIAN ASSISTANT

## 2024-08-25 PROCEDURE — 63710000001 INSULIN GLARGINE PER 5 UNITS: Performed by: PHYSICIAN ASSISTANT

## 2024-08-25 RX ADMIN — BACLOFEN 10 MG: 10 TABLET ORAL at 01:37

## 2024-08-25 RX ADMIN — Medication 10 MG: at 20:07

## 2024-08-25 RX ADMIN — Medication 500 MG: at 08:44

## 2024-08-25 RX ADMIN — INSULIN LISPRO 3 UNITS: 100 INJECTION, SOLUTION INTRAVENOUS; SUBCUTANEOUS at 12:25

## 2024-08-25 RX ADMIN — PREGABALIN 100 MG: 100 CAPSULE ORAL at 08:44

## 2024-08-25 RX ADMIN — FERROUS SULFATE TAB 325 MG (65 MG ELEMENTAL FE) 325 MG: 325 (65 FE) TAB at 08:44

## 2024-08-25 RX ADMIN — BACLOFEN 10 MG: 10 TABLET ORAL at 10:30

## 2024-08-25 RX ADMIN — BACLOFEN 10 MG: 10 TABLET ORAL at 20:07

## 2024-08-25 RX ADMIN — CETIRIZINE HYDROCHLORIDE 10 MG: 10 TABLET, FILM COATED ORAL at 08:44

## 2024-08-25 RX ADMIN — LISINOPRIL 2.5 MG: 2.5 TABLET ORAL at 08:44

## 2024-08-25 RX ADMIN — CHOLESTYRAMINE 1 PACKET: 4 POWDER, FOR SUSPENSION ORAL at 08:43

## 2024-08-25 RX ADMIN — INSULIN GLARGINE 22 UNITS: 100 INJECTION, SOLUTION SUBCUTANEOUS at 20:13

## 2024-08-25 RX ADMIN — DICLOFENAC SODIUM 4 G: 10 GEL TOPICAL at 15:21

## 2024-08-25 RX ADMIN — Medication 500 MG: at 20:07

## 2024-08-25 RX ADMIN — PREGABALIN 100 MG: 100 CAPSULE ORAL at 20:09

## 2024-08-25 RX ADMIN — INSULIN GLARGINE 22 UNITS: 100 INJECTION, SOLUTION SUBCUTANEOUS at 08:43

## 2024-08-25 RX ADMIN — MONTELUKAST 10 MG: 10 TABLET, FILM COATED ORAL at 20:09

## 2024-08-25 RX ADMIN — DICLOFENAC SODIUM 4 G: 10 GEL TOPICAL at 08:44

## 2024-08-25 RX ADMIN — INSULIN GLARGINE 22 UNITS: 100 INJECTION, SOLUTION SUBCUTANEOUS at 20:10

## 2024-08-25 RX ADMIN — APIXABAN 5 MG: 5 TABLET, FILM COATED ORAL at 20:08

## 2024-08-25 RX ADMIN — OXYCODONE HYDROCHLORIDE AND ACETAMINOPHEN 1 TABLET: 7.5; 325 TABLET ORAL at 20:07

## 2024-08-25 RX ADMIN — OXYCODONE HYDROCHLORIDE AND ACETAMINOPHEN 1 TABLET: 7.5; 325 TABLET ORAL at 01:37

## 2024-08-25 RX ADMIN — IBUPROFEN 400 MG: 400 TABLET ORAL at 03:49

## 2024-08-25 RX ADMIN — PREGABALIN 100 MG: 100 CAPSULE ORAL at 15:21

## 2024-08-25 RX ADMIN — CYANOCOBALAMIN TAB 500 MCG 1000 MCG: 500 TAB at 08:44

## 2024-08-25 RX ADMIN — FUROSEMIDE 40 MG: 40 TABLET ORAL at 08:44

## 2024-08-25 RX ADMIN — OXYCODONE HYDROCHLORIDE AND ACETAMINOPHEN 1 TABLET: 7.5; 325 TABLET ORAL at 10:30

## 2024-08-25 RX ADMIN — DICLOFENAC SODIUM 4 G: 10 GEL TOPICAL at 01:37

## 2024-08-25 RX ADMIN — DICLOFENAC SODIUM 4 G: 10 GEL TOPICAL at 20:12

## 2024-08-25 RX ADMIN — CHOLESTYRAMINE 1 PACKET: 4 POWDER, FOR SUSPENSION ORAL at 20:10

## 2024-08-25 RX ADMIN — APIXABAN 5 MG: 5 TABLET, FILM COATED ORAL at 08:44

## 2024-08-25 RX ADMIN — ATORVASTATIN CALCIUM 10 MG: 10 TABLET, FILM COATED ORAL at 20:08

## 2024-08-25 RX ADMIN — SERTRALINE HYDROCHLORIDE 50 MG: 50 TABLET ORAL at 20:08

## 2024-08-25 RX ADMIN — FUROSEMIDE 40 MG: 40 TABLET ORAL at 17:53

## 2024-08-25 RX ADMIN — EMPAGLIFLOZIN 10 MG: 10 TABLET, FILM COATED ORAL at 08:44

## 2024-08-25 NOTE — PLAN OF CARE
Goal Outcome Evaluation:  Plan of Care Reviewed With: patient        Progress: no change     Pt alert and oriented x 4. Pt displayed negative attention seeking behavior all night...consistently calling on staff and repeated requests. Pain medication given as ordered.

## 2024-08-25 NOTE — PLAN OF CARE
Goal Outcome Evaluation:  Plan of Care Reviewed With: patient        Progress: no change  Outcome Evaluation: PT is AAOx4, VSS, remains on SSI with insulin given as needed, chronic (L) hip pain treated with PRN Oxy and Baclofen as ordered, intermittently incontinent of bowel, will not always call out for bedpan assistance, last BM noted this shift, intermittently declines skin care, continued education given for diet compliance and door dash, re-enforced behavior contract, consistently seeks out staff and asks for pain medication, refuses to get OOB to BSC or chair, gets easily irritated when compliance and self care is recommended, bed in low/locked position, alarms refused, call light and personal items within reach, continue with current POC at this time.    PT door dashed a bag full of sweet treats (gummy bears, fruit pies, chocolate candy, etc) today after dinner.

## 2024-08-26 LAB
GLUCOSE BLDC GLUCOMTR-MCNC: 122 MG/DL (ref 70–99)
GLUCOSE BLDC GLUCOMTR-MCNC: 123 MG/DL (ref 70–99)
GLUCOSE BLDC GLUCOMTR-MCNC: 145 MG/DL (ref 70–99)
GLUCOSE BLDC GLUCOMTR-MCNC: 153 MG/DL (ref 70–99)
SARS-COV-2 RNA RESP QL NAA+PROBE: NOT DETECTED

## 2024-08-26 PROCEDURE — 63710000001 INSULIN LISPRO (HUMAN) PER 5 UNITS: Performed by: PHYSICIAN ASSISTANT

## 2024-08-26 PROCEDURE — 63710000001 ONDANSETRON ODT 4 MG TABLET DISPERSIBLE: Performed by: PHYSICIAN ASSISTANT

## 2024-08-26 PROCEDURE — 82948 REAGENT STRIP/BLOOD GLUCOSE: CPT

## 2024-08-26 PROCEDURE — 87635 SARS-COV-2 COVID-19 AMP PRB: CPT | Performed by: PHYSICIAN ASSISTANT

## 2024-08-26 PROCEDURE — 63710000001 INSULIN GLARGINE PER 5 UNITS: Performed by: PHYSICIAN ASSISTANT

## 2024-08-26 RX ADMIN — BACLOFEN 10 MG: 10 TABLET ORAL at 21:53

## 2024-08-26 RX ADMIN — INSULIN GLARGINE 22 UNITS: 100 INJECTION, SOLUTION SUBCUTANEOUS at 20:19

## 2024-08-26 RX ADMIN — OXYCODONE HYDROCHLORIDE AND ACETAMINOPHEN 1 TABLET: 7.5; 325 TABLET ORAL at 05:01

## 2024-08-26 RX ADMIN — Medication 1 APPLICATION: at 14:58

## 2024-08-26 RX ADMIN — INSULIN GLARGINE 22 UNITS: 100 INJECTION, SOLUTION SUBCUTANEOUS at 09:46

## 2024-08-26 RX ADMIN — OXYCODONE HYDROCHLORIDE AND ACETAMINOPHEN 1 TABLET: 7.5; 325 TABLET ORAL at 21:53

## 2024-08-26 RX ADMIN — INSULIN LISPRO 3 UNITS: 100 INJECTION, SOLUTION INTRAVENOUS; SUBCUTANEOUS at 20:20

## 2024-08-26 RX ADMIN — PREGABALIN 100 MG: 100 CAPSULE ORAL at 20:17

## 2024-08-26 RX ADMIN — EMPAGLIFLOZIN 10 MG: 10 TABLET, FILM COATED ORAL at 09:47

## 2024-08-26 RX ADMIN — PREGABALIN 100 MG: 100 CAPSULE ORAL at 16:58

## 2024-08-26 RX ADMIN — ONDANSETRON 4 MG: 4 TABLET, ORALLY DISINTEGRATING ORAL at 13:45

## 2024-08-26 RX ADMIN — FUROSEMIDE 40 MG: 40 TABLET ORAL at 18:29

## 2024-08-26 RX ADMIN — PREGABALIN 100 MG: 100 CAPSULE ORAL at 09:47

## 2024-08-26 RX ADMIN — ATORVASTATIN CALCIUM 10 MG: 10 TABLET, FILM COATED ORAL at 20:17

## 2024-08-26 RX ADMIN — BACLOFEN 10 MG: 10 TABLET ORAL at 13:45

## 2024-08-26 RX ADMIN — IBUPROFEN 400 MG: 400 TABLET ORAL at 19:56

## 2024-08-26 RX ADMIN — DICLOFENAC SODIUM 4 G: 10 GEL TOPICAL at 20:22

## 2024-08-26 RX ADMIN — APIXABAN 5 MG: 5 TABLET, FILM COATED ORAL at 20:18

## 2024-08-26 RX ADMIN — Medication 500 MG: at 20:17

## 2024-08-26 RX ADMIN — Medication 10 MG: at 20:17

## 2024-08-26 RX ADMIN — MONTELUKAST 10 MG: 10 TABLET, FILM COATED ORAL at 20:18

## 2024-08-26 RX ADMIN — DICLOFENAC SODIUM 4 G: 10 GEL TOPICAL at 14:56

## 2024-08-26 RX ADMIN — CYANOCOBALAMIN TAB 500 MCG 1000 MCG: 500 TAB at 09:47

## 2024-08-26 RX ADMIN — Medication 500 MG: at 09:47

## 2024-08-26 RX ADMIN — SERTRALINE HYDROCHLORIDE 50 MG: 50 TABLET ORAL at 20:18

## 2024-08-26 RX ADMIN — LISINOPRIL 2.5 MG: 2.5 TABLET ORAL at 09:47

## 2024-08-26 RX ADMIN — CHOLESTYRAMINE 1 PACKET: 4 POWDER, FOR SUSPENSION ORAL at 09:46

## 2024-08-26 RX ADMIN — CETIRIZINE HYDROCHLORIDE 10 MG: 10 TABLET, FILM COATED ORAL at 09:47

## 2024-08-26 RX ADMIN — DICLOFENAC SODIUM 4 G: 10 GEL TOPICAL at 01:21

## 2024-08-26 RX ADMIN — BACLOFEN 10 MG: 10 TABLET ORAL at 05:01

## 2024-08-26 RX ADMIN — FERROUS SULFATE TAB 325 MG (65 MG ELEMENTAL FE) 325 MG: 325 (65 FE) TAB at 07:31

## 2024-08-26 RX ADMIN — OXYCODONE HYDROCHLORIDE AND ACETAMINOPHEN 1 TABLET: 7.5; 325 TABLET ORAL at 13:45

## 2024-08-26 RX ADMIN — APIXABAN 5 MG: 5 TABLET, FILM COATED ORAL at 09:47

## 2024-08-26 RX ADMIN — DICLOFENAC SODIUM 4 G: 10 GEL TOPICAL at 07:31

## 2024-08-26 RX ADMIN — FUROSEMIDE 40 MG: 40 TABLET ORAL at 09:47

## 2024-08-26 NOTE — PLAN OF CARE
Goal Outcome Evaluation:  Plan of Care Reviewed With: patient        Progress: improving  Outcome Evaluation: No significant changes this shift. Blood sugar was 134, scheduled insulin given. He continues to use the bedpan and urinals. He c/o pain and discomfort and was medicated x2, somewhat effective. Will continue with his POC. Call light and personal items within reach.

## 2024-08-26 NOTE — PLAN OF CARE
Goal Outcome Evaluation:  Plan of Care Reviewed With: patient        Progress: improving  Outcome Evaluation: Alert and oriented and pleasant with staff. x3 assist for transfers and ambulation. x2 admin PRN pain medication, with relief of symptoms noted. No signifcant changes noted this shift. Sitting up in bed, call light in reach.

## 2024-08-27 LAB
GLUCOSE BLDC GLUCOMTR-MCNC: 108 MG/DL (ref 70–99)
GLUCOSE BLDC GLUCOMTR-MCNC: 134 MG/DL (ref 70–99)
GLUCOSE BLDC GLUCOMTR-MCNC: 139 MG/DL (ref 70–99)
GLUCOSE BLDC GLUCOMTR-MCNC: 185 MG/DL (ref 70–99)

## 2024-08-27 PROCEDURE — 63710000001 INSULIN LISPRO (HUMAN) PER 5 UNITS: Performed by: PHYSICIAN ASSISTANT

## 2024-08-27 PROCEDURE — 63710000001 INSULIN GLARGINE PER 5 UNITS: Performed by: PHYSICIAN ASSISTANT

## 2024-08-27 PROCEDURE — 82948 REAGENT STRIP/BLOOD GLUCOSE: CPT

## 2024-08-27 PROCEDURE — 63710000001 ONDANSETRON ODT 4 MG TABLET DISPERSIBLE: Performed by: PHYSICIAN ASSISTANT

## 2024-08-27 RX ADMIN — SERTRALINE HYDROCHLORIDE 50 MG: 50 TABLET ORAL at 21:21

## 2024-08-27 RX ADMIN — DICLOFENAC SODIUM 4 G: 10 GEL TOPICAL at 02:46

## 2024-08-27 RX ADMIN — FUROSEMIDE 40 MG: 40 TABLET ORAL at 18:32

## 2024-08-27 RX ADMIN — MONTELUKAST 10 MG: 10 TABLET, FILM COATED ORAL at 21:21

## 2024-08-27 RX ADMIN — DICLOFENAC SODIUM 4 G: 10 GEL TOPICAL at 14:45

## 2024-08-27 RX ADMIN — INSULIN LISPRO 3 UNITS: 100 INJECTION, SOLUTION INTRAVENOUS; SUBCUTANEOUS at 21:21

## 2024-08-27 RX ADMIN — PREGABALIN 100 MG: 100 CAPSULE ORAL at 09:13

## 2024-08-27 RX ADMIN — CETIRIZINE HYDROCHLORIDE 10 MG: 10 TABLET, FILM COATED ORAL at 09:14

## 2024-08-27 RX ADMIN — ACETAMINOPHEN 650 MG: 325 TABLET ORAL at 02:44

## 2024-08-27 RX ADMIN — DICLOFENAC SODIUM 4 G: 10 GEL TOPICAL at 21:21

## 2024-08-27 RX ADMIN — PREGABALIN 100 MG: 100 CAPSULE ORAL at 21:21

## 2024-08-27 RX ADMIN — IBUPROFEN 400 MG: 400 TABLET ORAL at 22:49

## 2024-08-27 RX ADMIN — Medication 10 MG: at 21:21

## 2024-08-27 RX ADMIN — FUROSEMIDE 40 MG: 40 TABLET ORAL at 09:13

## 2024-08-27 RX ADMIN — Medication 500 MG: at 09:13

## 2024-08-27 RX ADMIN — DICLOFENAC SODIUM 4 G: 10 GEL TOPICAL at 09:14

## 2024-08-27 RX ADMIN — ONDANSETRON 4 MG: 4 TABLET, ORALLY DISINTEGRATING ORAL at 14:45

## 2024-08-27 RX ADMIN — INSULIN GLARGINE 22 UNITS: 100 INJECTION, SOLUTION SUBCUTANEOUS at 09:13

## 2024-08-27 RX ADMIN — OXYCODONE HYDROCHLORIDE AND ACETAMINOPHEN 1 TABLET: 7.5; 325 TABLET ORAL at 14:47

## 2024-08-27 RX ADMIN — BACLOFEN 10 MG: 10 TABLET ORAL at 06:10

## 2024-08-27 RX ADMIN — APIXABAN 5 MG: 5 TABLET, FILM COATED ORAL at 09:13

## 2024-08-27 RX ADMIN — PREGABALIN 100 MG: 100 CAPSULE ORAL at 18:32

## 2024-08-27 RX ADMIN — LISINOPRIL 2.5 MG: 2.5 TABLET ORAL at 09:13

## 2024-08-27 RX ADMIN — ATORVASTATIN CALCIUM 10 MG: 10 TABLET, FILM COATED ORAL at 21:21

## 2024-08-27 RX ADMIN — BACLOFEN 10 MG: 10 TABLET ORAL at 14:47

## 2024-08-27 RX ADMIN — EMPAGLIFLOZIN 10 MG: 10 TABLET, FILM COATED ORAL at 09:13

## 2024-08-27 RX ADMIN — INSULIN GLARGINE 22 UNITS: 100 INJECTION, SOLUTION SUBCUTANEOUS at 21:21

## 2024-08-27 RX ADMIN — Medication 500 MG: at 21:21

## 2024-08-27 RX ADMIN — OXYCODONE HYDROCHLORIDE AND ACETAMINOPHEN 1 TABLET: 7.5; 325 TABLET ORAL at 06:10

## 2024-08-27 RX ADMIN — FERROUS SULFATE TAB 325 MG (65 MG ELEMENTAL FE) 325 MG: 325 (65 FE) TAB at 09:13

## 2024-08-27 RX ADMIN — APIXABAN 5 MG: 5 TABLET, FILM COATED ORAL at 21:21

## 2024-08-27 NOTE — PLAN OF CARE
Goal Outcome Evaluation:           Progress: improving  Outcome Evaluation: Pt. is alert and oriented x4. Pt has been using the urinal throughout the day. Pt's pain has been controlled with prn pain medication throughout the day. Pt's BS has been controlled throughout the day and has not needed any insulin to control BS. Pt does not get up throughout the day. Call light and personal items are within his reach.

## 2024-08-27 NOTE — PLAN OF CARE
Goal Outcome Evaluation:  Plan of Care Reviewed With: patient        Progress: improving  Outcome Evaluation: Pt is alert and oriented. He uses the bedpan and urinal day and night and refuses turns. Was medicated for breakthrough pain x 2 with both Tylenol and then Ibuprofen. Then medicated for severe pain on the left hip with both Baclofen and Percocet with minimal relief. Spilt his drink on himself during the night. He talked about having a gastric bypass to loose his weight. Will continue with his plan of care. Call light and personal items within reach.                                No

## 2024-08-28 LAB
ALBUMIN SERPL-MCNC: 2.7 G/DL (ref 3.5–5.2)
ALBUMIN/GLOB SERPL: 1 G/DL
ALP SERPL-CCNC: 166 U/L (ref 39–117)
ALT SERPL W P-5'-P-CCNC: 28 U/L (ref 1–41)
ANION GAP SERPL CALCULATED.3IONS-SCNC: 7 MMOL/L (ref 5–15)
AST SERPL-CCNC: 39 U/L (ref 1–40)
BASOPHILS # BLD AUTO: 0.03 10*3/MM3 (ref 0–0.2)
BASOPHILS NFR BLD AUTO: 0.8 % (ref 0–1.5)
BILIRUB SERPL-MCNC: 0.4 MG/DL (ref 0–1.2)
BUN SERPL-MCNC: 20 MG/DL (ref 8–23)
BUN/CREAT SERPL: 28.6 (ref 7–25)
CALCIUM SPEC-SCNC: 8.1 MG/DL (ref 8.6–10.5)
CHLORIDE SERPL-SCNC: 102 MMOL/L (ref 98–107)
CHOLEST SERPL-MCNC: 85 MG/DL (ref 0–200)
CO2 SERPL-SCNC: 27 MMOL/L (ref 22–29)
CREAT SERPL-MCNC: 0.7 MG/DL (ref 0.76–1.27)
DEPRECATED RDW RBC AUTO: 46 FL (ref 37–54)
EGFRCR SERPLBLD CKD-EPI 2021: 102.3 ML/MIN/1.73
EOSINOPHIL # BLD AUTO: 0.11 10*3/MM3 (ref 0–0.4)
EOSINOPHIL NFR BLD AUTO: 2.8 % (ref 0.3–6.2)
ERYTHROCYTE [DISTWIDTH] IN BLOOD BY AUTOMATED COUNT: 14.6 % (ref 12.3–15.4)
FOLATE SERPL-MCNC: 9.09 NG/ML (ref 4.78–24.2)
GLOBULIN UR ELPH-MCNC: 2.6 GM/DL
GLUCOSE BLDC GLUCOMTR-MCNC: 132 MG/DL (ref 70–99)
GLUCOSE BLDC GLUCOMTR-MCNC: 135 MG/DL (ref 70–99)
GLUCOSE BLDC GLUCOMTR-MCNC: 192 MG/DL (ref 70–99)
GLUCOSE BLDC GLUCOMTR-MCNC: 94 MG/DL (ref 70–99)
GLUCOSE SERPL-MCNC: 166 MG/DL (ref 65–99)
HBA1C MFR BLD: 4.4 % (ref 4.8–5.6)
HCT VFR BLD AUTO: 31.3 % (ref 37.5–51)
HDLC SERPL-MCNC: 51 MG/DL (ref 40–60)
HGB BLD-MCNC: 9.7 G/DL (ref 13–17.7)
IMM GRANULOCYTES # BLD AUTO: 0.01 10*3/MM3 (ref 0–0.05)
IMM GRANULOCYTES NFR BLD AUTO: 0.3 % (ref 0–0.5)
LDLC SERPL CALC-MCNC: 20 MG/DL (ref 0–100)
LDLC/HDLC SERPL: 0.44 {RATIO}
LYMPHOCYTES # BLD AUTO: 1 10*3/MM3 (ref 0.7–3.1)
LYMPHOCYTES NFR BLD AUTO: 25 % (ref 19.6–45.3)
MCH RBC QN AUTO: 26.6 PG (ref 26.6–33)
MCHC RBC AUTO-ENTMCNC: 31 G/DL (ref 31.5–35.7)
MCV RBC AUTO: 86 FL (ref 79–97)
MONOCYTES # BLD AUTO: 0.56 10*3/MM3 (ref 0.1–0.9)
MONOCYTES NFR BLD AUTO: 14 % (ref 5–12)
NEUTROPHILS NFR BLD AUTO: 2.29 10*3/MM3 (ref 1.7–7)
NEUTROPHILS NFR BLD AUTO: 57.1 % (ref 42.7–76)
NRBC BLD AUTO-RTO: 0 /100 WBC (ref 0–0.2)
PLATELET # BLD AUTO: 93 10*3/MM3 (ref 140–450)
PMV BLD AUTO: 12.5 FL (ref 6–12)
POTASSIUM SERPL-SCNC: 3.9 MMOL/L (ref 3.5–5.2)
PROT SERPL-MCNC: 5.3 G/DL (ref 6–8.5)
RBC # BLD AUTO: 3.64 10*6/MM3 (ref 4.14–5.8)
SODIUM SERPL-SCNC: 136 MMOL/L (ref 136–145)
TRIGL SERPL-MCNC: 58 MG/DL (ref 0–150)
VIT B12 BLD-MCNC: 1450 PG/ML (ref 211–946)
VLDLC SERPL-MCNC: 14 MG/DL (ref 5–40)
WBC NRBC COR # BLD AUTO: 4 10*3/MM3 (ref 3.4–10.8)

## 2024-08-28 PROCEDURE — 82746 ASSAY OF FOLIC ACID SERUM: CPT | Performed by: INTERNAL MEDICINE

## 2024-08-28 PROCEDURE — 80053 COMPREHEN METABOLIC PANEL: CPT | Performed by: INTERNAL MEDICINE

## 2024-08-28 PROCEDURE — 83036 HEMOGLOBIN GLYCOSYLATED A1C: CPT | Performed by: INTERNAL MEDICINE

## 2024-08-28 PROCEDURE — 80061 LIPID PANEL: CPT | Performed by: INTERNAL MEDICINE

## 2024-08-28 PROCEDURE — 82607 VITAMIN B-12: CPT | Performed by: INTERNAL MEDICINE

## 2024-08-28 PROCEDURE — 63710000001 INSULIN GLARGINE PER 5 UNITS: Performed by: PHYSICIAN ASSISTANT

## 2024-08-28 PROCEDURE — 82948 REAGENT STRIP/BLOOD GLUCOSE: CPT

## 2024-08-28 PROCEDURE — 63710000001 INSULIN LISPRO (HUMAN) PER 5 UNITS: Performed by: PHYSICIAN ASSISTANT

## 2024-08-28 PROCEDURE — 85025 COMPLETE CBC W/AUTO DIFF WBC: CPT | Performed by: INTERNAL MEDICINE

## 2024-08-28 RX ADMIN — FUROSEMIDE 40 MG: 40 TABLET ORAL at 17:52

## 2024-08-28 RX ADMIN — DICLOFENAC SODIUM 4 G: 10 GEL TOPICAL at 13:52

## 2024-08-28 RX ADMIN — INSULIN GLARGINE 22 UNITS: 100 INJECTION, SOLUTION SUBCUTANEOUS at 08:16

## 2024-08-28 RX ADMIN — CYANOCOBALAMIN TAB 500 MCG 1000 MCG: 500 TAB at 08:16

## 2024-08-28 RX ADMIN — INSULIN LISPRO 3 UNITS: 100 INJECTION, SOLUTION INTRAVENOUS; SUBCUTANEOUS at 20:39

## 2024-08-28 RX ADMIN — MONTELUKAST 10 MG: 10 TABLET, FILM COATED ORAL at 20:39

## 2024-08-28 RX ADMIN — INSULIN GLARGINE 22 UNITS: 100 INJECTION, SOLUTION SUBCUTANEOUS at 20:39

## 2024-08-28 RX ADMIN — CETIRIZINE HYDROCHLORIDE 10 MG: 10 TABLET, FILM COATED ORAL at 08:16

## 2024-08-28 RX ADMIN — BACLOFEN 10 MG: 10 TABLET ORAL at 20:20

## 2024-08-28 RX ADMIN — Medication 10 MG: at 20:38

## 2024-08-28 RX ADMIN — DICLOFENAC SODIUM 4 G: 10 GEL TOPICAL at 08:15

## 2024-08-28 RX ADMIN — Medication 1 APPLICATION: at 15:54

## 2024-08-28 RX ADMIN — PREGABALIN 100 MG: 100 CAPSULE ORAL at 08:16

## 2024-08-28 RX ADMIN — BACLOFEN 10 MG: 10 TABLET ORAL at 00:45

## 2024-08-28 RX ADMIN — OXYCODONE HYDROCHLORIDE AND ACETAMINOPHEN 1 TABLET: 7.5; 325 TABLET ORAL at 20:20

## 2024-08-28 RX ADMIN — Medication 500 MG: at 08:16

## 2024-08-28 RX ADMIN — EMPAGLIFLOZIN 10 MG: 10 TABLET, FILM COATED ORAL at 08:16

## 2024-08-28 RX ADMIN — Medication 500 MG: at 20:38

## 2024-08-28 RX ADMIN — FERROUS SULFATE TAB 325 MG (65 MG ELEMENTAL FE) 325 MG: 325 (65 FE) TAB at 08:16

## 2024-08-28 RX ADMIN — ATORVASTATIN CALCIUM 10 MG: 10 TABLET, FILM COATED ORAL at 20:38

## 2024-08-28 RX ADMIN — APIXABAN 5 MG: 5 TABLET, FILM COATED ORAL at 09:52

## 2024-08-28 RX ADMIN — APIXABAN 5 MG: 5 TABLET, FILM COATED ORAL at 20:38

## 2024-08-28 RX ADMIN — SERTRALINE HYDROCHLORIDE 50 MG: 50 TABLET ORAL at 20:39

## 2024-08-28 RX ADMIN — ACETAMINOPHEN 650 MG: 325 TABLET ORAL at 06:14

## 2024-08-28 RX ADMIN — PREGABALIN 100 MG: 100 CAPSULE ORAL at 20:39

## 2024-08-28 RX ADMIN — BACLOFEN 10 MG: 10 TABLET ORAL at 09:51

## 2024-08-28 RX ADMIN — PREGABALIN 100 MG: 100 CAPSULE ORAL at 15:54

## 2024-08-28 RX ADMIN — OXYCODONE HYDROCHLORIDE AND ACETAMINOPHEN 1 TABLET: 7.5; 325 TABLET ORAL at 09:51

## 2024-08-28 RX ADMIN — OXYCODONE HYDROCHLORIDE AND ACETAMINOPHEN 1 TABLET: 7.5; 325 TABLET ORAL at 00:45

## 2024-08-28 NOTE — PLAN OF CARE
Goal Outcome Evaluation:  Plan of Care Reviewed With: patient        Progress: no change  Outcome Evaluation: PT is AAOx4, VSS, remains on SSI with insulin given as ordered,  at HS, PRN Baclofen and Oxy given as ordered for (L) hip pain, uses urinal, last BM noted 8/27, continues to refuse to get OOB to BSC or sit up in chair, refuses skin care, no behavioral issues this shift, was calm and cooperative, bed in low/locked position, refuses alarm, call light and personal items within reach, continue with current POC at this time.

## 2024-08-29 LAB
GLUCOSE BLDC GLUCOMTR-MCNC: 106 MG/DL (ref 70–99)
GLUCOSE BLDC GLUCOMTR-MCNC: 121 MG/DL (ref 70–99)
GLUCOSE BLDC GLUCOMTR-MCNC: 134 MG/DL (ref 70–99)
GLUCOSE BLDC GLUCOMTR-MCNC: 147 MG/DL (ref 70–99)
SARS-COV-2 RNA RESP QL NAA+PROBE: NOT DETECTED

## 2024-08-29 PROCEDURE — 82948 REAGENT STRIP/BLOOD GLUCOSE: CPT

## 2024-08-29 PROCEDURE — 63710000001 INSULIN GLARGINE PER 5 UNITS: Performed by: PHYSICIAN ASSISTANT

## 2024-08-29 PROCEDURE — 87635 SARS-COV-2 COVID-19 AMP PRB: CPT | Performed by: PHYSICIAN ASSISTANT

## 2024-08-29 RX ORDER — OXYCODONE AND ACETAMINOPHEN 7.5; 325 MG/1; MG/1
1 TABLET ORAL EVERY 6 HOURS PRN
Status: DISPENSED | OUTPATIENT
Start: 2024-08-29 | End: 2024-09-05

## 2024-08-29 RX ADMIN — Medication 10 MG: at 21:02

## 2024-08-29 RX ADMIN — FERROUS SULFATE TAB 325 MG (65 MG ELEMENTAL FE) 325 MG: 325 (65 FE) TAB at 09:18

## 2024-08-29 RX ADMIN — CETIRIZINE HYDROCHLORIDE 10 MG: 10 TABLET, FILM COATED ORAL at 09:19

## 2024-08-29 RX ADMIN — Medication 500 MG: at 09:18

## 2024-08-29 RX ADMIN — PREGABALIN 100 MG: 100 CAPSULE ORAL at 09:18

## 2024-08-29 RX ADMIN — ATORVASTATIN CALCIUM 10 MG: 10 TABLET, FILM COATED ORAL at 21:02

## 2024-08-29 RX ADMIN — APIXABAN 5 MG: 5 TABLET, FILM COATED ORAL at 09:18

## 2024-08-29 RX ADMIN — DICLOFENAC SODIUM 4 G: 10 GEL TOPICAL at 09:18

## 2024-08-29 RX ADMIN — BACLOFEN 10 MG: 10 TABLET ORAL at 04:40

## 2024-08-29 RX ADMIN — INSULIN GLARGINE 22 UNITS: 100 INJECTION, SOLUTION SUBCUTANEOUS at 21:01

## 2024-08-29 RX ADMIN — DICLOFENAC SODIUM 4 G: 10 GEL TOPICAL at 21:00

## 2024-08-29 RX ADMIN — BACLOFEN 10 MG: 10 TABLET ORAL at 21:02

## 2024-08-29 RX ADMIN — CYANOCOBALAMIN TAB 500 MCG 1000 MCG: 500 TAB at 09:19

## 2024-08-29 RX ADMIN — OXYCODONE HYDROCHLORIDE AND ACETAMINOPHEN 1 TABLET: 7.5; 325 TABLET ORAL at 04:40

## 2024-08-29 RX ADMIN — SERTRALINE HYDROCHLORIDE 50 MG: 50 TABLET ORAL at 21:02

## 2024-08-29 RX ADMIN — EMPAGLIFLOZIN 10 MG: 10 TABLET, FILM COATED ORAL at 09:18

## 2024-08-29 RX ADMIN — MONTELUKAST 10 MG: 10 TABLET, FILM COATED ORAL at 21:02

## 2024-08-29 RX ADMIN — DICLOFENAC SODIUM 4 G: 10 GEL TOPICAL at 02:55

## 2024-08-29 RX ADMIN — OXYCODONE HYDROCHLORIDE AND ACETAMINOPHEN 1 TABLET: 7.5; 325 TABLET ORAL at 21:02

## 2024-08-29 RX ADMIN — PREGABALIN 100 MG: 100 CAPSULE ORAL at 15:14

## 2024-08-29 RX ADMIN — BACLOFEN 10 MG: 10 TABLET ORAL at 12:38

## 2024-08-29 RX ADMIN — APIXABAN 5 MG: 5 TABLET, FILM COATED ORAL at 21:02

## 2024-08-29 RX ADMIN — IBUPROFEN 400 MG: 400 TABLET ORAL at 03:15

## 2024-08-29 RX ADMIN — INSULIN GLARGINE 22 UNITS: 100 INJECTION, SOLUTION SUBCUTANEOUS at 09:18

## 2024-08-29 RX ADMIN — OXYCODONE HYDROCHLORIDE AND ACETAMINOPHEN 1 TABLET: 7.5; 325 TABLET ORAL at 12:38

## 2024-08-29 RX ADMIN — DICLOFENAC SODIUM 4 G: 10 GEL TOPICAL at 15:14

## 2024-08-29 RX ADMIN — FUROSEMIDE 40 MG: 40 TABLET ORAL at 17:24

## 2024-08-29 RX ADMIN — Medication 500 MG: at 21:02

## 2024-08-29 RX ADMIN — PREGABALIN 100 MG: 100 CAPSULE ORAL at 21:02

## 2024-08-29 RX ADMIN — ACETAMINOPHEN 650 MG: 325 TABLET ORAL at 15:14

## 2024-08-29 NOTE — PLAN OF CARE
Goal Outcome Evaluation:              Outcome Evaluation: No changes this shift. PRN medications given for hip/shoulder pain.

## 2024-08-29 NOTE — PLAN OF CARE
Goal Outcome Evaluation:  Plan of Care Reviewed With: patient        Progress: no change  Outcome Evaluation: Rsd AAOX4 . Pleasant and cooperative with care. Percocet and Baclofen given for L hip pain, Ibuprophen to carry over until next dose of Percocet. Last dose given  at 0430. Pictures taken of BLE wounds , see Avatar. Call light and personal items within reach at bedside. Continue POC.

## 2024-08-29 NOTE — PLAN OF CARE
Goal Outcome Evaluation:  Plan of Care Reviewed With: patient        Progress: no change  Outcome Evaluation: Patient is alert and oriented x4. Given Percocet and Baclofen at 0951 for severe left hip pain. Med effective. Patient con't to refuse flonase that is ordered. Refuses incentive spirometer and oral care. Refused to allow staff to obtain his weight. Voices needs. Con't current POC.

## 2024-08-30 LAB
GLUCOSE BLDC GLUCOMTR-MCNC: 128 MG/DL (ref 70–99)
GLUCOSE BLDC GLUCOMTR-MCNC: 134 MG/DL (ref 70–99)
GLUCOSE BLDC GLUCOMTR-MCNC: 147 MG/DL (ref 70–99)
GLUCOSE BLDC GLUCOMTR-MCNC: 166 MG/DL (ref 70–99)

## 2024-08-30 PROCEDURE — 63710000001 INSULIN LISPRO (HUMAN) PER 5 UNITS: Performed by: PHYSICIAN ASSISTANT

## 2024-08-30 PROCEDURE — 63710000001 INSULIN GLARGINE PER 5 UNITS: Performed by: PHYSICIAN ASSISTANT

## 2024-08-30 PROCEDURE — 63710000001 ONDANSETRON ODT 4 MG TABLET DISPERSIBLE: Performed by: PHYSICIAN ASSISTANT

## 2024-08-30 PROCEDURE — 82948 REAGENT STRIP/BLOOD GLUCOSE: CPT

## 2024-08-30 RX ADMIN — PREGABALIN 100 MG: 100 CAPSULE ORAL at 20:36

## 2024-08-30 RX ADMIN — DICLOFENAC SODIUM 4 G: 10 GEL TOPICAL at 09:32

## 2024-08-30 RX ADMIN — FUROSEMIDE 40 MG: 40 TABLET ORAL at 18:36

## 2024-08-30 RX ADMIN — ONDANSETRON 4 MG: 4 TABLET, ORALLY DISINTEGRATING ORAL at 11:45

## 2024-08-30 RX ADMIN — Medication 500 MG: at 20:36

## 2024-08-30 RX ADMIN — DICLOFENAC SODIUM 4 G: 10 GEL TOPICAL at 01:54

## 2024-08-30 RX ADMIN — EMPAGLIFLOZIN 10 MG: 10 TABLET, FILM COATED ORAL at 09:33

## 2024-08-30 RX ADMIN — PREGABALIN 100 MG: 100 CAPSULE ORAL at 09:33

## 2024-08-30 RX ADMIN — APIXABAN 5 MG: 5 TABLET, FILM COATED ORAL at 09:33

## 2024-08-30 RX ADMIN — INSULIN LISPRO 3 UNITS: 100 INJECTION, SOLUTION INTRAVENOUS; SUBCUTANEOUS at 13:16

## 2024-08-30 RX ADMIN — IBUPROFEN 400 MG: 400 TABLET ORAL at 19:46

## 2024-08-30 RX ADMIN — BACLOFEN 10 MG: 10 TABLET ORAL at 22:19

## 2024-08-30 RX ADMIN — CYANOCOBALAMIN TAB 500 MCG 1000 MCG: 500 TAB at 09:33

## 2024-08-30 RX ADMIN — OXYCODONE HYDROCHLORIDE AND ACETAMINOPHEN 1 TABLET: 7.5; 325 TABLET ORAL at 14:02

## 2024-08-30 RX ADMIN — FUROSEMIDE 40 MG: 40 TABLET ORAL at 09:33

## 2024-08-30 RX ADMIN — Medication 10 MG: at 20:36

## 2024-08-30 RX ADMIN — OXYCODONE HYDROCHLORIDE AND ACETAMINOPHEN 1 TABLET: 7.5; 325 TABLET ORAL at 22:19

## 2024-08-30 RX ADMIN — BACLOFEN 10 MG: 10 TABLET ORAL at 14:02

## 2024-08-30 RX ADMIN — DICLOFENAC SODIUM 4 G: 10 GEL TOPICAL at 20:36

## 2024-08-30 RX ADMIN — SERTRALINE HYDROCHLORIDE 50 MG: 50 TABLET ORAL at 20:36

## 2024-08-30 RX ADMIN — PREGABALIN 100 MG: 100 CAPSULE ORAL at 18:36

## 2024-08-30 RX ADMIN — LISINOPRIL 2.5 MG: 2.5 TABLET ORAL at 09:33

## 2024-08-30 RX ADMIN — DICLOFENAC SODIUM 4 G: 10 GEL TOPICAL at 14:02

## 2024-08-30 RX ADMIN — MONTELUKAST 10 MG: 10 TABLET, FILM COATED ORAL at 20:36

## 2024-08-30 RX ADMIN — BACLOFEN 10 MG: 10 TABLET ORAL at 05:37

## 2024-08-30 RX ADMIN — INSULIN GLARGINE 22 UNITS: 100 INJECTION, SOLUTION SUBCUTANEOUS at 20:36

## 2024-08-30 RX ADMIN — ATORVASTATIN CALCIUM 10 MG: 10 TABLET, FILM COATED ORAL at 20:36

## 2024-08-30 RX ADMIN — OXYCODONE HYDROCHLORIDE AND ACETAMINOPHEN 1 TABLET: 7.5; 325 TABLET ORAL at 05:37

## 2024-08-30 RX ADMIN — FERROUS SULFATE TAB 325 MG (65 MG ELEMENTAL FE) 325 MG: 325 (65 FE) TAB at 09:33

## 2024-08-30 RX ADMIN — Medication 500 MG: at 09:33

## 2024-08-30 RX ADMIN — APIXABAN 5 MG: 5 TABLET, FILM COATED ORAL at 20:36

## 2024-08-30 RX ADMIN — INSULIN GLARGINE 22 UNITS: 100 INJECTION, SOLUTION SUBCUTANEOUS at 09:34

## 2024-08-30 RX ADMIN — CETIRIZINE HYDROCHLORIDE 10 MG: 10 TABLET, FILM COATED ORAL at 09:33

## 2024-08-30 NOTE — PLAN OF CARE
Goal Outcome Evaluation:  Plan of Care Reviewed With: patient        Progress: no change  Outcome Evaluation: Rsd. is alert and oriented x4, able to make needs known to staff.  Rsd. medicated x1 with prn oxycodone, and baclofen, see emar.  Rsd. states medication effective.  Rsd. refuses to get out of bed this shift, urinal remains at bedside.  Continues to be incontinent of bowel and bladder.  Rsd. is x1 assist to bedpan, uses trapeze bar to shift weight.  Rsd. refuses bed alerts and to turn side to side.  Call light and personal items within reach.  Rsd. reminded to use call light for assistance, verbalizes understanding.  WIll continue to monitor and notify on-coming staff.  Current plan of care remains in place at this time.

## 2024-08-31 LAB
GLUCOSE BLDC GLUCOMTR-MCNC: 102 MG/DL (ref 70–99)
GLUCOSE BLDC GLUCOMTR-MCNC: 107 MG/DL (ref 70–99)
GLUCOSE BLDC GLUCOMTR-MCNC: 135 MG/DL (ref 70–99)
GLUCOSE BLDC GLUCOMTR-MCNC: 233 MG/DL (ref 70–99)

## 2024-08-31 PROCEDURE — 63710000001 INSULIN GLARGINE PER 5 UNITS: Performed by: PHYSICIAN ASSISTANT

## 2024-08-31 PROCEDURE — 63710000001 INSULIN LISPRO (HUMAN) PER 5 UNITS: Performed by: PHYSICIAN ASSISTANT

## 2024-08-31 PROCEDURE — 82948 REAGENT STRIP/BLOOD GLUCOSE: CPT

## 2024-08-31 RX ADMIN — INSULIN LISPRO 5 UNITS: 100 INJECTION, SOLUTION INTRAVENOUS; SUBCUTANEOUS at 21:08

## 2024-08-31 RX ADMIN — DICLOFENAC SODIUM 4 G: 10 GEL TOPICAL at 02:47

## 2024-08-31 RX ADMIN — DICLOFENAC SODIUM 4 G: 10 GEL TOPICAL at 08:22

## 2024-08-31 RX ADMIN — APIXABAN 5 MG: 5 TABLET, FILM COATED ORAL at 08:20

## 2024-08-31 RX ADMIN — INSULIN GLARGINE 22 UNITS: 100 INJECTION, SOLUTION SUBCUTANEOUS at 21:08

## 2024-08-31 RX ADMIN — CYANOCOBALAMIN TAB 500 MCG 1000 MCG: 500 TAB at 08:20

## 2024-08-31 RX ADMIN — SERTRALINE HYDROCHLORIDE 50 MG: 50 TABLET ORAL at 21:08

## 2024-08-31 RX ADMIN — FUROSEMIDE 40 MG: 40 TABLET ORAL at 08:20

## 2024-08-31 RX ADMIN — OXYCODONE HYDROCHLORIDE AND ACETAMINOPHEN 1 TABLET: 7.5; 325 TABLET ORAL at 09:38

## 2024-08-31 RX ADMIN — BACLOFEN 10 MG: 10 TABLET ORAL at 18:49

## 2024-08-31 RX ADMIN — INSULIN GLARGINE 22 UNITS: 100 INJECTION, SOLUTION SUBCUTANEOUS at 08:19

## 2024-08-31 RX ADMIN — PREGABALIN 100 MG: 100 CAPSULE ORAL at 21:07

## 2024-08-31 RX ADMIN — CETIRIZINE HYDROCHLORIDE 10 MG: 10 TABLET, FILM COATED ORAL at 08:20

## 2024-08-31 RX ADMIN — Medication 500 MG: at 21:08

## 2024-08-31 RX ADMIN — PREGABALIN 100 MG: 100 CAPSULE ORAL at 08:20

## 2024-08-31 RX ADMIN — EMPAGLIFLOZIN 10 MG: 10 TABLET, FILM COATED ORAL at 08:20

## 2024-08-31 RX ADMIN — FERROUS SULFATE TAB 325 MG (65 MG ELEMENTAL FE) 325 MG: 325 (65 FE) TAB at 08:20

## 2024-08-31 RX ADMIN — LISINOPRIL 2.5 MG: 2.5 TABLET ORAL at 08:20

## 2024-08-31 RX ADMIN — DICLOFENAC SODIUM 4 G: 10 GEL TOPICAL at 21:00

## 2024-08-31 RX ADMIN — BACLOFEN 10 MG: 10 TABLET ORAL at 09:38

## 2024-08-31 RX ADMIN — IBUPROFEN 400 MG: 400 TABLET ORAL at 14:53

## 2024-08-31 RX ADMIN — MONTELUKAST 10 MG: 10 TABLET, FILM COATED ORAL at 21:07

## 2024-08-31 RX ADMIN — Medication 500 MG: at 08:20

## 2024-08-31 RX ADMIN — OXYCODONE HYDROCHLORIDE AND ACETAMINOPHEN 1 TABLET: 7.5; 325 TABLET ORAL at 18:49

## 2024-08-31 RX ADMIN — Medication 10 MG: at 21:08

## 2024-08-31 RX ADMIN — FUROSEMIDE 40 MG: 40 TABLET ORAL at 17:20

## 2024-08-31 RX ADMIN — ATORVASTATIN CALCIUM 10 MG: 10 TABLET, FILM COATED ORAL at 21:07

## 2024-08-31 RX ADMIN — APIXABAN 5 MG: 5 TABLET, FILM COATED ORAL at 21:07

## 2024-08-31 NOTE — PLAN OF CARE
Goal Outcome Evaluation:  Plan of Care Reviewed With: patient        Progress: no change  Outcome Evaluation: Aox4, call light and personal items within reach. Rsd ordered door dash for dinner. I/c of bowels, 1x on the bedpan, urinal at bedside. C/o left hip and shoulder pain (see emar). Con't plan of care

## 2024-09-01 LAB
GLUCOSE BLDC GLUCOMTR-MCNC: 114 MG/DL (ref 70–99)
GLUCOSE BLDC GLUCOMTR-MCNC: 124 MG/DL (ref 70–99)
GLUCOSE BLDC GLUCOMTR-MCNC: 137 MG/DL (ref 70–99)
GLUCOSE BLDC GLUCOMTR-MCNC: 138 MG/DL (ref 70–99)

## 2024-09-01 PROCEDURE — 63710000001 INSULIN GLARGINE PER 5 UNITS: Performed by: PHYSICIAN ASSISTANT

## 2024-09-01 PROCEDURE — 82948 REAGENT STRIP/BLOOD GLUCOSE: CPT

## 2024-09-01 RX ADMIN — INSULIN GLARGINE 22 UNITS: 100 INJECTION, SOLUTION SUBCUTANEOUS at 20:33

## 2024-09-01 RX ADMIN — ACETAMINOPHEN 650 MG: 325 TABLET ORAL at 07:22

## 2024-09-01 RX ADMIN — CYANOCOBALAMIN TAB 500 MCG 1000 MCG: 500 TAB at 08:01

## 2024-09-01 RX ADMIN — MONTELUKAST 10 MG: 10 TABLET, FILM COATED ORAL at 20:33

## 2024-09-01 RX ADMIN — Medication 500 MG: at 20:33

## 2024-09-01 RX ADMIN — DICLOFENAC SODIUM 4 G: 10 GEL TOPICAL at 07:23

## 2024-09-01 RX ADMIN — EMPAGLIFLOZIN 10 MG: 10 TABLET, FILM COATED ORAL at 08:01

## 2024-09-01 RX ADMIN — SERTRALINE HYDROCHLORIDE 50 MG: 50 TABLET ORAL at 20:33

## 2024-09-01 RX ADMIN — BACLOFEN 10 MG: 10 TABLET ORAL at 13:15

## 2024-09-01 RX ADMIN — Medication 10 MG: at 20:33

## 2024-09-01 RX ADMIN — BACLOFEN 10 MG: 10 TABLET ORAL at 21:26

## 2024-09-01 RX ADMIN — FUROSEMIDE 40 MG: 40 TABLET ORAL at 08:01

## 2024-09-01 RX ADMIN — PREGABALIN 100 MG: 100 CAPSULE ORAL at 16:17

## 2024-09-01 RX ADMIN — FERROUS SULFATE TAB 325 MG (65 MG ELEMENTAL FE) 325 MG: 325 (65 FE) TAB at 08:01

## 2024-09-01 RX ADMIN — FUROSEMIDE 40 MG: 40 TABLET ORAL at 18:12

## 2024-09-01 RX ADMIN — Medication 500 MG: at 08:01

## 2024-09-01 RX ADMIN — INSULIN GLARGINE 22 UNITS: 100 INJECTION, SOLUTION SUBCUTANEOUS at 08:00

## 2024-09-01 RX ADMIN — OXYCODONE HYDROCHLORIDE AND ACETAMINOPHEN 1 TABLET: 7.5; 325 TABLET ORAL at 02:53

## 2024-09-01 RX ADMIN — OXYCODONE HYDROCHLORIDE AND ACETAMINOPHEN 1 TABLET: 7.5; 325 TABLET ORAL at 13:15

## 2024-09-01 RX ADMIN — PREGABALIN 100 MG: 100 CAPSULE ORAL at 20:33

## 2024-09-01 RX ADMIN — PREGABALIN 100 MG: 100 CAPSULE ORAL at 08:01

## 2024-09-01 RX ADMIN — ATORVASTATIN CALCIUM 10 MG: 10 TABLET, FILM COATED ORAL at 20:33

## 2024-09-01 RX ADMIN — CETIRIZINE HYDROCHLORIDE 10 MG: 10 TABLET, FILM COATED ORAL at 08:01

## 2024-09-01 RX ADMIN — LISINOPRIL 2.5 MG: 2.5 TABLET ORAL at 08:01

## 2024-09-01 RX ADMIN — DICLOFENAC SODIUM 4 G: 10 GEL TOPICAL at 20:33

## 2024-09-01 RX ADMIN — APIXABAN 5 MG: 5 TABLET, FILM COATED ORAL at 20:33

## 2024-09-01 RX ADMIN — BACLOFEN 10 MG: 10 TABLET ORAL at 02:53

## 2024-09-01 RX ADMIN — OXYCODONE HYDROCHLORIDE AND ACETAMINOPHEN 1 TABLET: 7.5; 325 TABLET ORAL at 21:26

## 2024-09-01 RX ADMIN — DICLOFENAC SODIUM 4 G: 10 GEL TOPICAL at 02:12

## 2024-09-01 RX ADMIN — IBUPROFEN 400 MG: 400 TABLET ORAL at 09:36

## 2024-09-01 RX ADMIN — APIXABAN 5 MG: 5 TABLET, FILM COATED ORAL at 08:01

## 2024-09-01 NOTE — PLAN OF CARE
Goal Outcome Evaluation:  Plan of Care Reviewed With: patient        Progress: no change  Outcome Evaluation: Aox4, call light and personal items within reach. I/c of bowels, 1x on the bedpan, urinal at bedside. C/o left hip and shoulder pain (see emar). Con't plan of care

## 2024-09-02 LAB
GLUCOSE BLDC GLUCOMTR-MCNC: 116 MG/DL (ref 70–99)
GLUCOSE BLDC GLUCOMTR-MCNC: 116 MG/DL (ref 70–99)
GLUCOSE BLDC GLUCOMTR-MCNC: 123 MG/DL (ref 70–99)
GLUCOSE BLDC GLUCOMTR-MCNC: 162 MG/DL (ref 70–99)
SARS-COV-2 RNA RESP QL NAA+PROBE: NOT DETECTED

## 2024-09-02 PROCEDURE — 63710000001 INSULIN GLARGINE PER 5 UNITS: Performed by: PHYSICIAN ASSISTANT

## 2024-09-02 PROCEDURE — 63710000001 ONDANSETRON ODT 4 MG TABLET DISPERSIBLE: Performed by: PHYSICIAN ASSISTANT

## 2024-09-02 PROCEDURE — 87635 SARS-COV-2 COVID-19 AMP PRB: CPT | Performed by: PHYSICIAN ASSISTANT

## 2024-09-02 PROCEDURE — 63710000001 INSULIN LISPRO (HUMAN) PER 5 UNITS: Performed by: PHYSICIAN ASSISTANT

## 2024-09-02 PROCEDURE — 82948 REAGENT STRIP/BLOOD GLUCOSE: CPT

## 2024-09-02 RX ADMIN — FUROSEMIDE 40 MG: 40 TABLET ORAL at 17:33

## 2024-09-02 RX ADMIN — ATORVASTATIN CALCIUM 10 MG: 10 TABLET, FILM COATED ORAL at 20:25

## 2024-09-02 RX ADMIN — EMPAGLIFLOZIN 10 MG: 10 TABLET, FILM COATED ORAL at 08:37

## 2024-09-02 RX ADMIN — BACLOFEN 10 MG: 10 TABLET ORAL at 05:27

## 2024-09-02 RX ADMIN — ONDANSETRON 4 MG: 4 TABLET, ORALLY DISINTEGRATING ORAL at 16:21

## 2024-09-02 RX ADMIN — PREGABALIN 100 MG: 100 CAPSULE ORAL at 16:21

## 2024-09-02 RX ADMIN — FERROUS SULFATE TAB 325 MG (65 MG ELEMENTAL FE) 325 MG: 325 (65 FE) TAB at 08:37

## 2024-09-02 RX ADMIN — LISINOPRIL 2.5 MG: 2.5 TABLET ORAL at 08:37

## 2024-09-02 RX ADMIN — Medication 10 MG: at 20:25

## 2024-09-02 RX ADMIN — Medication 500 MG: at 20:25

## 2024-09-02 RX ADMIN — APIXABAN 5 MG: 5 TABLET, FILM COATED ORAL at 20:25

## 2024-09-02 RX ADMIN — OXYCODONE HYDROCHLORIDE AND ACETAMINOPHEN 1 TABLET: 7.5; 325 TABLET ORAL at 05:27

## 2024-09-02 RX ADMIN — Medication 500 MG: at 08:37

## 2024-09-02 RX ADMIN — DICLOFENAC SODIUM 4 G: 10 GEL TOPICAL at 20:26

## 2024-09-02 RX ADMIN — INSULIN GLARGINE 22 UNITS: 100 INJECTION, SOLUTION SUBCUTANEOUS at 20:26

## 2024-09-02 RX ADMIN — APIXABAN 5 MG: 5 TABLET, FILM COATED ORAL at 08:37

## 2024-09-02 RX ADMIN — DICLOFENAC SODIUM 4 G: 10 GEL TOPICAL at 08:36

## 2024-09-02 RX ADMIN — DICLOFENAC SODIUM 4 G: 10 GEL TOPICAL at 02:22

## 2024-09-02 RX ADMIN — SERTRALINE HYDROCHLORIDE 50 MG: 50 TABLET ORAL at 20:25

## 2024-09-02 RX ADMIN — DICLOFENAC SODIUM 4 G: 10 GEL TOPICAL at 13:16

## 2024-09-02 RX ADMIN — PREGABALIN 100 MG: 100 CAPSULE ORAL at 20:25

## 2024-09-02 RX ADMIN — MONTELUKAST 10 MG: 10 TABLET, FILM COATED ORAL at 20:25

## 2024-09-02 RX ADMIN — FUROSEMIDE 40 MG: 40 TABLET ORAL at 08:38

## 2024-09-02 RX ADMIN — CETIRIZINE HYDROCHLORIDE 10 MG: 10 TABLET, FILM COATED ORAL at 08:37

## 2024-09-02 RX ADMIN — PREGABALIN 100 MG: 100 CAPSULE ORAL at 08:38

## 2024-09-02 RX ADMIN — INSULIN GLARGINE 22 UNITS: 100 INJECTION, SOLUTION SUBCUTANEOUS at 08:36

## 2024-09-02 RX ADMIN — INSULIN LISPRO 3 UNITS: 100 INJECTION, SOLUTION INTRAVENOUS; SUBCUTANEOUS at 17:33

## 2024-09-02 RX ADMIN — OXYCODONE HYDROCHLORIDE AND ACETAMINOPHEN 1 TABLET: 7.5; 325 TABLET ORAL at 21:54

## 2024-09-02 RX ADMIN — OXYCODONE HYDROCHLORIDE AND ACETAMINOPHEN 1 TABLET: 7.5; 325 TABLET ORAL at 13:29

## 2024-09-02 RX ADMIN — BACLOFEN 10 MG: 10 TABLET ORAL at 13:29

## 2024-09-02 RX ADMIN — CYANOCOBALAMIN TAB 500 MCG 1000 MCG: 500 TAB at 08:37

## 2024-09-02 RX ADMIN — BACLOFEN 10 MG: 10 TABLET ORAL at 21:54

## 2024-09-02 NOTE — PLAN OF CARE
Goal Outcome Evaluation:  Plan of Care Reviewed With: patient        Progress: no change  Outcome Evaluation: Patient c/o hemmorhoid pain this shift. PRN hemorrhoid cream applied. His blood sugar elevated this evening, slidng scale insulin given. He c/o pain to hip/shoulder this afternoon, prn pain medication and muscle relaxer given. Antiemetic given for nausea this afternoon.

## 2024-09-03 LAB
GLUCOSE BLDC GLUCOMTR-MCNC: 101 MG/DL (ref 70–99)
GLUCOSE BLDC GLUCOMTR-MCNC: 106 MG/DL (ref 70–99)
GLUCOSE BLDC GLUCOMTR-MCNC: 121 MG/DL (ref 70–99)
GLUCOSE BLDC GLUCOMTR-MCNC: 232 MG/DL (ref 70–99)

## 2024-09-03 PROCEDURE — 63710000001 INSULIN GLARGINE PER 5 UNITS: Performed by: PHYSICIAN ASSISTANT

## 2024-09-03 PROCEDURE — 82948 REAGENT STRIP/BLOOD GLUCOSE: CPT

## 2024-09-03 PROCEDURE — 63710000001 INSULIN LISPRO (HUMAN) PER 5 UNITS: Performed by: PHYSICIAN ASSISTANT

## 2024-09-03 RX ADMIN — BACLOFEN 10 MG: 10 TABLET ORAL at 14:34

## 2024-09-03 RX ADMIN — Medication 10 MG: at 20:50

## 2024-09-03 RX ADMIN — INSULIN GLARGINE 22 UNITS: 100 INJECTION, SOLUTION SUBCUTANEOUS at 20:52

## 2024-09-03 RX ADMIN — OXYCODONE HYDROCHLORIDE AND ACETAMINOPHEN 1 TABLET: 7.5; 325 TABLET ORAL at 14:34

## 2024-09-03 RX ADMIN — LISINOPRIL 2.5 MG: 2.5 TABLET ORAL at 08:32

## 2024-09-03 RX ADMIN — FUROSEMIDE 40 MG: 40 TABLET ORAL at 19:00

## 2024-09-03 RX ADMIN — EMPAGLIFLOZIN 10 MG: 10 TABLET, FILM COATED ORAL at 08:30

## 2024-09-03 RX ADMIN — FUROSEMIDE 40 MG: 40 TABLET ORAL at 08:31

## 2024-09-03 RX ADMIN — INSULIN GLARGINE 22 UNITS: 100 INJECTION, SOLUTION SUBCUTANEOUS at 08:31

## 2024-09-03 RX ADMIN — OXYCODONE HYDROCHLORIDE AND ACETAMINOPHEN 1 TABLET: 7.5; 325 TABLET ORAL at 06:36

## 2024-09-03 RX ADMIN — Medication 500 MG: at 20:48

## 2024-09-03 RX ADMIN — CETIRIZINE HYDROCHLORIDE 10 MG: 10 TABLET, FILM COATED ORAL at 08:31

## 2024-09-03 RX ADMIN — ATORVASTATIN CALCIUM 10 MG: 10 TABLET, FILM COATED ORAL at 20:49

## 2024-09-03 RX ADMIN — BACLOFEN 10 MG: 10 TABLET ORAL at 22:40

## 2024-09-03 RX ADMIN — Medication 500 MG: at 08:31

## 2024-09-03 RX ADMIN — APIXABAN 5 MG: 5 TABLET, FILM COATED ORAL at 20:49

## 2024-09-03 RX ADMIN — BACLOFEN 10 MG: 10 TABLET ORAL at 06:36

## 2024-09-03 RX ADMIN — FERROUS SULFATE TAB 325 MG (65 MG ELEMENTAL FE) 325 MG: 325 (65 FE) TAB at 07:56

## 2024-09-03 RX ADMIN — SERTRALINE HYDROCHLORIDE 50 MG: 50 TABLET ORAL at 20:49

## 2024-09-03 RX ADMIN — DICLOFENAC SODIUM 4 G: 10 GEL TOPICAL at 07:57

## 2024-09-03 RX ADMIN — APIXABAN 5 MG: 5 TABLET, FILM COATED ORAL at 08:31

## 2024-09-03 RX ADMIN — PREGABALIN 100 MG: 100 CAPSULE ORAL at 20:48

## 2024-09-03 RX ADMIN — CYANOCOBALAMIN TAB 500 MCG 1000 MCG: 500 TAB at 08:31

## 2024-09-03 RX ADMIN — HYDROCORTISONE 2.5%: 25 CREAM TOPICAL at 15:20

## 2024-09-03 RX ADMIN — MONTELUKAST 10 MG: 10 TABLET, FILM COATED ORAL at 20:49

## 2024-09-03 RX ADMIN — DICLOFENAC SODIUM 4 G: 10 GEL TOPICAL at 20:52

## 2024-09-03 RX ADMIN — DICLOFENAC SODIUM 4 G: 10 GEL TOPICAL at 14:34

## 2024-09-03 RX ADMIN — PREGABALIN 100 MG: 100 CAPSULE ORAL at 08:31

## 2024-09-03 RX ADMIN — INSULIN LISPRO 5 UNITS: 100 INJECTION, SOLUTION INTRAVENOUS; SUBCUTANEOUS at 20:52

## 2024-09-03 RX ADMIN — PREGABALIN 100 MG: 100 CAPSULE ORAL at 16:58

## 2024-09-03 RX ADMIN — OXYCODONE HYDROCHLORIDE AND ACETAMINOPHEN 1 TABLET: 7.5; 325 TABLET ORAL at 22:40

## 2024-09-03 NOTE — PLAN OF CARE
Goal Outcome Evaluation:  Plan of Care Reviewed With: patient        Progress: no change  Outcome Evaluation: Rsd AAOX4, makes needs known to staff. FSBG 116,  no SSI required this shift. Pain med baclofen and  Norco x1with acceptable results. No complaints of nausea. Pt c/o hemorrhoid  pain, medicated ceam applied as prescribed.  Refused care to BLE. No other complaints. Continue POC.

## 2024-09-04 LAB
GLUCOSE BLDC GLUCOMTR-MCNC: 116 MG/DL (ref 70–99)
GLUCOSE BLDC GLUCOMTR-MCNC: 117 MG/DL (ref 70–99)
GLUCOSE BLDC GLUCOMTR-MCNC: 126 MG/DL (ref 70–99)
GLUCOSE BLDC GLUCOMTR-MCNC: 138 MG/DL (ref 70–99)

## 2024-09-04 PROCEDURE — 82948 REAGENT STRIP/BLOOD GLUCOSE: CPT

## 2024-09-04 PROCEDURE — 63710000001 INSULIN GLARGINE PER 5 UNITS: Performed by: PHYSICIAN ASSISTANT

## 2024-09-04 RX ADMIN — DICLOFENAC SODIUM 4 G: 10 GEL TOPICAL at 13:45

## 2024-09-04 RX ADMIN — FUROSEMIDE 40 MG: 40 TABLET ORAL at 08:49

## 2024-09-04 RX ADMIN — FERROUS SULFATE TAB 325 MG (65 MG ELEMENTAL FE) 325 MG: 325 (65 FE) TAB at 08:49

## 2024-09-04 RX ADMIN — PREGABALIN 100 MG: 100 CAPSULE ORAL at 21:16

## 2024-09-04 RX ADMIN — BACLOFEN 10 MG: 10 TABLET ORAL at 15:46

## 2024-09-04 RX ADMIN — APIXABAN 5 MG: 5 TABLET, FILM COATED ORAL at 08:49

## 2024-09-04 RX ADMIN — OXYCODONE HYDROCHLORIDE AND ACETAMINOPHEN 1 TABLET: 7.5; 325 TABLET ORAL at 15:46

## 2024-09-04 RX ADMIN — FUROSEMIDE 40 MG: 40 TABLET ORAL at 18:12

## 2024-09-04 RX ADMIN — Medication 500 MG: at 08:49

## 2024-09-04 RX ADMIN — MONTELUKAST 10 MG: 10 TABLET, FILM COATED ORAL at 21:16

## 2024-09-04 RX ADMIN — OXYCODONE HYDROCHLORIDE AND ACETAMINOPHEN 1 TABLET: 7.5; 325 TABLET ORAL at 23:57

## 2024-09-04 RX ADMIN — ATORVASTATIN CALCIUM 10 MG: 10 TABLET, FILM COATED ORAL at 21:16

## 2024-09-04 RX ADMIN — Medication 10 MG: at 21:16

## 2024-09-04 RX ADMIN — CYANOCOBALAMIN TAB 500 MCG 1000 MCG: 500 TAB at 08:49

## 2024-09-04 RX ADMIN — DICLOFENAC SODIUM 4 G: 10 GEL TOPICAL at 01:49

## 2024-09-04 RX ADMIN — OXYCODONE HYDROCHLORIDE AND ACETAMINOPHEN 1 TABLET: 7.5; 325 TABLET ORAL at 06:28

## 2024-09-04 RX ADMIN — SERTRALINE HYDROCHLORIDE 50 MG: 50 TABLET ORAL at 21:16

## 2024-09-04 RX ADMIN — INSULIN GLARGINE 22 UNITS: 100 INJECTION, SOLUTION SUBCUTANEOUS at 08:48

## 2024-09-04 RX ADMIN — APIXABAN 5 MG: 5 TABLET, FILM COATED ORAL at 21:16

## 2024-09-04 RX ADMIN — PREGABALIN 100 MG: 100 CAPSULE ORAL at 08:49

## 2024-09-04 RX ADMIN — LISINOPRIL 2.5 MG: 2.5 TABLET ORAL at 08:49

## 2024-09-04 RX ADMIN — BACLOFEN 10 MG: 10 TABLET ORAL at 23:57

## 2024-09-04 RX ADMIN — IBUPROFEN 400 MG: 400 TABLET ORAL at 03:38

## 2024-09-04 RX ADMIN — INSULIN GLARGINE 22 UNITS: 100 INJECTION, SOLUTION SUBCUTANEOUS at 21:15

## 2024-09-04 RX ADMIN — ACETAMINOPHEN 650 MG: 325 TABLET ORAL at 10:17

## 2024-09-04 RX ADMIN — Medication 500 MG: at 21:16

## 2024-09-04 RX ADMIN — PREGABALIN 100 MG: 100 CAPSULE ORAL at 15:46

## 2024-09-04 RX ADMIN — CETIRIZINE HYDROCHLORIDE 10 MG: 10 TABLET, FILM COATED ORAL at 08:49

## 2024-09-04 RX ADMIN — BACLOFEN 10 MG: 10 TABLET ORAL at 06:28

## 2024-09-04 RX ADMIN — DICLOFENAC SODIUM 4 G: 10 GEL TOPICAL at 21:15

## 2024-09-04 RX ADMIN — EMPAGLIFLOZIN 10 MG: 10 TABLET, FILM COATED ORAL at 08:48

## 2024-09-04 NOTE — PLAN OF CARE
Goal Outcome Evaluation:  Plan of Care Reviewed With: patient        Progress: no change  Outcome Evaluation: Alert and oriented x4. Given PRN Baclofen and percocet at 1434 for severe left hip pain/spasms. Meds effective. Anusol-HC applied at 1520 for c/o painful hemorhoids. Refused Aveeno on BLE today. Two small red spots noted on right adbomen. WCON notified. Voices needs. Con't current POC.

## 2024-09-04 NOTE — PLAN OF CARE
Goal Outcome Evaluation:  Plan of Care Reviewed With: patient        Progress: no change  Outcome Evaluation: Aox4, call light and personal items within reach. I/c of bowels, 1x on the bedpan, urinal at bedside. No singificant changes. C/o left hip and shoulder pain (see emar). Skin care given and encouraged. Wound care contacted about new area on lower, right abd. Con't plan of care

## 2024-09-04 NOTE — PLAN OF CARE
Goal Outcome Evaluation:  Plan of Care Reviewed With: patient        Progress: improving  Outcome Evaluation: No significant changes this shift. Pt was medicated for pain x 3 mostly in the left hip. He uses a bedpan or urinal.Two reddened spots onhis right lower abdomen noted. Wound has been consulted yesterday and made aware. Will continue with his plan of care. Call light within reach.

## 2024-09-05 LAB
GLUCOSE BLDC GLUCOMTR-MCNC: 110 MG/DL (ref 70–99)
GLUCOSE BLDC GLUCOMTR-MCNC: 177 MG/DL (ref 70–99)
GLUCOSE BLDC GLUCOMTR-MCNC: 184 MG/DL (ref 70–99)
GLUCOSE BLDC GLUCOMTR-MCNC: 83 MG/DL (ref 70–99)
SARS-COV-2 RNA RESP QL NAA+PROBE: NOT DETECTED

## 2024-09-05 PROCEDURE — 63710000001 INSULIN GLARGINE PER 5 UNITS: Performed by: PHYSICIAN ASSISTANT

## 2024-09-05 PROCEDURE — 63710000001 INSULIN LISPRO (HUMAN) PER 5 UNITS: Performed by: PHYSICIAN ASSISTANT

## 2024-09-05 PROCEDURE — 87635 SARS-COV-2 COVID-19 AMP PRB: CPT | Performed by: PHYSICIAN ASSISTANT

## 2024-09-05 PROCEDURE — 82948 REAGENT STRIP/BLOOD GLUCOSE: CPT

## 2024-09-05 RX ORDER — OXYCODONE AND ACETAMINOPHEN 7.5; 325 MG/1; MG/1
1 TABLET ORAL EVERY 6 HOURS PRN
Status: DISPENSED | OUTPATIENT
Start: 2024-09-05 | End: 2024-09-12

## 2024-09-05 RX ORDER — CHOLESTYRAMINE LIGHT 4 G/5.7G
1 POWDER, FOR SUSPENSION ORAL DAILY
Status: DISPENSED | OUTPATIENT
Start: 2024-09-06

## 2024-09-05 RX ADMIN — PREGABALIN 100 MG: 100 CAPSULE ORAL at 16:57

## 2024-09-05 RX ADMIN — LISINOPRIL 2.5 MG: 2.5 TABLET ORAL at 09:09

## 2024-09-05 RX ADMIN — OXYCODONE HYDROCHLORIDE AND ACETAMINOPHEN 1 TABLET: 7.5; 325 TABLET ORAL at 19:28

## 2024-09-05 RX ADMIN — DICLOFENAC SODIUM 4 G: 10 GEL TOPICAL at 09:09

## 2024-09-05 RX ADMIN — CYANOCOBALAMIN TAB 500 MCG 1000 MCG: 500 TAB at 09:09

## 2024-09-05 RX ADMIN — APIXABAN 5 MG: 5 TABLET, FILM COATED ORAL at 09:10

## 2024-09-05 RX ADMIN — FUROSEMIDE 40 MG: 40 TABLET ORAL at 18:05

## 2024-09-05 RX ADMIN — BACLOFEN 10 MG: 10 TABLET ORAL at 09:09

## 2024-09-05 RX ADMIN — FUROSEMIDE 40 MG: 40 TABLET ORAL at 09:10

## 2024-09-05 RX ADMIN — FERROUS SULFATE TAB 325 MG (65 MG ELEMENTAL FE) 325 MG: 325 (65 FE) TAB at 09:09

## 2024-09-05 RX ADMIN — DICLOFENAC SODIUM 4 G: 10 GEL TOPICAL at 19:28

## 2024-09-05 RX ADMIN — CETIRIZINE HYDROCHLORIDE 10 MG: 10 TABLET, FILM COATED ORAL at 09:10

## 2024-09-05 RX ADMIN — ACETAMINOPHEN 650 MG: 325 TABLET ORAL at 06:33

## 2024-09-05 RX ADMIN — BACLOFEN 10 MG: 10 TABLET ORAL at 19:28

## 2024-09-05 RX ADMIN — PREGABALIN 100 MG: 100 CAPSULE ORAL at 09:10

## 2024-09-05 RX ADMIN — EMPAGLIFLOZIN 10 MG: 10 TABLET, FILM COATED ORAL at 09:09

## 2024-09-05 RX ADMIN — APIXABAN 5 MG: 5 TABLET, FILM COATED ORAL at 21:27

## 2024-09-05 RX ADMIN — INSULIN LISPRO 3 UNITS: 100 INJECTION, SOLUTION INTRAVENOUS; SUBCUTANEOUS at 18:05

## 2024-09-05 RX ADMIN — INSULIN GLARGINE 22 UNITS: 100 INJECTION, SOLUTION SUBCUTANEOUS at 21:26

## 2024-09-05 RX ADMIN — Medication 10 MG: at 21:27

## 2024-09-05 RX ADMIN — Medication 500 MG: at 09:09

## 2024-09-05 RX ADMIN — INSULIN GLARGINE 22 UNITS: 100 INJECTION, SOLUTION SUBCUTANEOUS at 09:17

## 2024-09-05 RX ADMIN — MONTELUKAST 10 MG: 10 TABLET, FILM COATED ORAL at 21:27

## 2024-09-05 RX ADMIN — PREGABALIN 100 MG: 100 CAPSULE ORAL at 21:27

## 2024-09-05 RX ADMIN — ATORVASTATIN CALCIUM 10 MG: 10 TABLET, FILM COATED ORAL at 21:27

## 2024-09-05 RX ADMIN — INSULIN LISPRO 3 UNITS: 100 INJECTION, SOLUTION INTRAVENOUS; SUBCUTANEOUS at 21:26

## 2024-09-05 RX ADMIN — SERTRALINE HYDROCHLORIDE 50 MG: 50 TABLET ORAL at 21:27

## 2024-09-05 RX ADMIN — OXYCODONE HYDROCHLORIDE AND ACETAMINOPHEN 1 TABLET: 7.5; 325 TABLET ORAL at 09:10

## 2024-09-05 RX ADMIN — Medication 500 MG: at 21:27

## 2024-09-05 NOTE — PLAN OF CARE
Goal Outcome Evaluation:  Plan of Care Reviewed With: patient        Progress: no change  Outcome Evaluation: Aox4, call light and personal items within reach. 1x on the bedpan, urinal at bedside. C/o left hip and shoulder pain (see emar). Skin care given and encouraged. Wound care contacted. Con't plan of care

## 2024-09-05 NOTE — PLAN OF CARE
Goal Outcome Evaluation:  Plan of Care Reviewed With: patient        Progress: no change  Outcome Evaluation: Alert and oriented x4; no significant changes. Still refuses turns but assists when being cleaned after incontinent episodes. Percocet and Baclofen administered x1 this shift. Blood sugar 116; Lantus administered per MAR. Call light within reach; care plan ongoing.

## 2024-09-06 LAB
GLUCOSE BLDC GLUCOMTR-MCNC: 124 MG/DL (ref 70–99)
GLUCOSE BLDC GLUCOMTR-MCNC: 131 MG/DL (ref 70–99)
GLUCOSE BLDC GLUCOMTR-MCNC: 151 MG/DL (ref 70–99)
GLUCOSE BLDC GLUCOMTR-MCNC: 218 MG/DL (ref 70–99)
GLUCOSE BLDC GLUCOMTR-MCNC: 339 MG/DL (ref 70–99)
GLUCOSE BLDC GLUCOMTR-MCNC: 81 MG/DL (ref 70–99)

## 2024-09-06 PROCEDURE — 63710000001 INSULIN GLARGINE PER 5 UNITS: Performed by: PHYSICIAN ASSISTANT

## 2024-09-06 PROCEDURE — 82948 REAGENT STRIP/BLOOD GLUCOSE: CPT

## 2024-09-06 PROCEDURE — 63710000001 INSULIN LISPRO (HUMAN) PER 5 UNITS: Performed by: PHYSICIAN ASSISTANT

## 2024-09-06 RX ADMIN — MONTELUKAST 10 MG: 10 TABLET, FILM COATED ORAL at 21:25

## 2024-09-06 RX ADMIN — Medication 500 MG: at 21:25

## 2024-09-06 RX ADMIN — APIXABAN 5 MG: 5 TABLET, FILM COATED ORAL at 21:25

## 2024-09-06 RX ADMIN — PREGABALIN 100 MG: 100 CAPSULE ORAL at 16:17

## 2024-09-06 RX ADMIN — EMPAGLIFLOZIN 10 MG: 10 TABLET, FILM COATED ORAL at 09:27

## 2024-09-06 RX ADMIN — OXYCODONE HYDROCHLORIDE AND ACETAMINOPHEN 1 TABLET: 7.5; 325 TABLET ORAL at 03:53

## 2024-09-06 RX ADMIN — FUROSEMIDE 40 MG: 40 TABLET ORAL at 18:13

## 2024-09-06 RX ADMIN — OXYCODONE HYDROCHLORIDE AND ACETAMINOPHEN 1 TABLET: 7.5; 325 TABLET ORAL at 18:13

## 2024-09-06 RX ADMIN — Medication 10 MG: at 21:25

## 2024-09-06 RX ADMIN — PREGABALIN 100 MG: 100 CAPSULE ORAL at 21:26

## 2024-09-06 RX ADMIN — OXYCODONE HYDROCHLORIDE AND ACETAMINOPHEN 1 TABLET: 7.5; 325 TABLET ORAL at 11:01

## 2024-09-06 RX ADMIN — IBUPROFEN 400 MG: 400 TABLET ORAL at 00:42

## 2024-09-06 RX ADMIN — FUROSEMIDE 40 MG: 40 TABLET ORAL at 09:27

## 2024-09-06 RX ADMIN — PREGABALIN 100 MG: 100 CAPSULE ORAL at 09:27

## 2024-09-06 RX ADMIN — LISINOPRIL 2.5 MG: 2.5 TABLET ORAL at 09:26

## 2024-09-06 RX ADMIN — INSULIN GLARGINE 22 UNITS: 100 INJECTION, SOLUTION SUBCUTANEOUS at 21:24

## 2024-09-06 RX ADMIN — DICLOFENAC SODIUM 4 G: 10 GEL TOPICAL at 08:13

## 2024-09-06 RX ADMIN — INSULIN LISPRO 5 UNITS: 100 INJECTION, SOLUTION INTRAVENOUS; SUBCUTANEOUS at 08:13

## 2024-09-06 RX ADMIN — CYANOCOBALAMIN TAB 500 MCG 1000 MCG: 500 TAB at 09:27

## 2024-09-06 RX ADMIN — Medication 500 MG: at 09:27

## 2024-09-06 RX ADMIN — DICLOFENAC SODIUM 4 G: 10 GEL TOPICAL at 01:35

## 2024-09-06 RX ADMIN — BACLOFEN 10 MG: 10 TABLET ORAL at 18:13

## 2024-09-06 RX ADMIN — INSULIN GLARGINE 22 UNITS: 100 INJECTION, SOLUTION SUBCUTANEOUS at 09:26

## 2024-09-06 RX ADMIN — BACLOFEN 10 MG: 10 TABLET ORAL at 11:01

## 2024-09-06 RX ADMIN — INSULIN LISPRO 3 UNITS: 100 INJECTION, SOLUTION INTRAVENOUS; SUBCUTANEOUS at 21:33

## 2024-09-06 RX ADMIN — FERROUS SULFATE TAB 325 MG (65 MG ELEMENTAL FE) 325 MG: 325 (65 FE) TAB at 08:13

## 2024-09-06 RX ADMIN — CETIRIZINE HYDROCHLORIDE 10 MG: 10 TABLET, FILM COATED ORAL at 09:27

## 2024-09-06 RX ADMIN — ATORVASTATIN CALCIUM 10 MG: 10 TABLET, FILM COATED ORAL at 21:25

## 2024-09-06 RX ADMIN — APIXABAN 5 MG: 5 TABLET, FILM COATED ORAL at 09:27

## 2024-09-06 RX ADMIN — DICLOFENAC SODIUM 4 G: 10 GEL TOPICAL at 21:19

## 2024-09-06 RX ADMIN — CHOLESTYRAMINE 4 G: 4 POWDER, FOR SUSPENSION ORAL at 09:26

## 2024-09-06 RX ADMIN — SERTRALINE HYDROCHLORIDE 50 MG: 50 TABLET ORAL at 21:25

## 2024-09-06 RX ADMIN — BACLOFEN 10 MG: 10 TABLET ORAL at 03:53

## 2024-09-06 NOTE — PLAN OF CARE
Goal Outcome Evaluation:  Plan of Care Reviewed With: patient        Progress: improving  Outcome Evaluation: Alert and oriented and pleasant with staff this shift. x2 Max assist for transfers and ambulation. x1 admin PRN pain medication, with relief of symptoms noted. No significant changes noted this shift. Sitting up in bed call light in reach.

## 2024-09-06 NOTE — SIGNIFICANT NOTE
Wound Eval / Progress Noted    KEO Dominguez     Patient Name: Jose Shaikh  : 1960  MRN: [id number]  Today's Date: 2024                 Admit Date: 2023    Visit Dx:    ICD-10-CM ICD-9-CM   1. Difficulty in walking  R26.2 719.7   2. Type 2 diabetes mellitus with other specified complication, unspecified whether long term insulin use  E11.69 250.80         Debility    Ulcer of right foot    Ulcerated, foot, left, limited to breakdown of skin    Onychomycosis    Noncompliance of patient with dietary regimen        Past Medical History:   Diagnosis Date    Absence of toe of right foot     Acute osteomyelitis of left calcaneus  2021    Anxiety and depression     Arthritis     Cancer     Chronic pain     STATES HIS PAIN IS 10/10 AAT    Claustrophobia     Corns and callus     Diabetic ulcer of left heel associated with type 2 DM 2021    Diabetic ulcer of left heel associated with type 2 DM 2021    Diabetic ulcer of right midfoot associated with type 2 DM 2021    Difficulty walking     Essential hypertension 2021    Hammertoe     Hyperlipidemia LDL goal <100 2021    Ingrown toenail     Obesity     Paroxysmal atrial fibrillation 2021    Polyneuropathy     Pressure ulcer, stage 1     Tinea unguium     Type 2 diabetes mellitus with polyneuropathy         Past Surgical History:   Procedure Laterality Date    CYST REMOVAL      center of back; benign    EYE SURGERY      INCISION AND DRAINAGE ABSCESS      back    INCISION AND DRAINAGE LEG Right 12/10/2021    Procedure: INCISION AND DRAINAGE LOWER EXTREMITY;  Surgeon: Ash Leyva DPM;  Location: [facility] MAIN OR;  Service: Podiatry;  Laterality: Right;    OTHER SURGICAL HISTORY      Surgical clips left foot    TOE SURGERY Right     Removal of 5th toe    TRANS METATARSAL AMPUTATION Right 2021    Procedure: AMPUTATION TRANS METATARSAL;  Surgeon: Ash Leyva DPM;  Location: [facility] MAIN OR;   Service: Podiatry;  Laterality: Right;    VASCULAR SURGERY      WRIST SURGERY Left     repair of injury         Physical Assessment:  Rash 04/06/24 1659 Left calf plaque (Active)   Wound Image   09/06/24 0906   Distribution localized 09/06/24 0906   Color pink;red 09/06/24 0906   Configuration/Shape asymmetric 09/06/24 0906   Borders irregular 09/06/24 0906   Characteristics raised;dry 09/06/24 0906   Care, Rash cleansed with;sterile normal saline;open to air 09/06/24 0906       Rash 04/17/24 1443 Right lower leg macular (Active)   Wound Image    09/06/24 0906   Distribution localized 09/06/24 0906   Color pink;red 09/06/24 0906   Configuration/Shape asymmetric 09/06/24 0906   Borders irregular 09/06/24 0906   Characteristics dry;raised 09/06/24 0906   Care, Rash cleansed with;sterile normal saline;open to air 09/06/24 0906       Wound 09/04/24 1555 Right lower abdomen (Active)   Wound Image   09/06/24 0906   Dressing Appearance open to air 09/06/24 0906   Closure None 09/06/24 0906   Base moist;red 09/06/24 0906   Red (%), Wound Tissue Color 100 09/06/24 0906   Periwound dry;pink 09/06/24 0906   Periwound Temperature warm 09/06/24 0906   Periwound Skin Turgor soft 09/06/24 0906   Edges open 09/06/24 0906   Drainage Characteristics/Odor serous 09/06/24 0906   Drainage Amount small 09/06/24 0906   Care, Wound cleansed with;sterile normal saline 09/06/24 0906   Dressing Care dressing applied;non-adherent;petroleum-based;gauze;silicone;border dressing 09/06/24 0906   Periwound Care absorptive dressing applied 09/06/24 0906        Wound Check / Follow-up: Patient seen today for wound consult.  Patient is awake, alert, and oriented.  Patient is currently in skilled nursing facility.    Two areas of open tissue noted to right lower abdomen, areas appear to be deroofed blisters.  Edema noted to generalized abdomen.  Wound bases and noted to be moist and red.  A small amount of serous drainage noted.  Periwound tissue is  dry and pink.  Cleansed with normal saline and gauze, blotted dry, and applied non-adherent, petroleum-based gauze with silicone border dressing securement.  Recommending daily dressing changes with silver impregnated Hydrofiber and silicone border dressing securement.    Chronic discoloration remains to bilateral lower extremities.  Areas of raised, dry tissue to left calf and right anterior lower leg persist.  No drainage noted at this time.  Cleansed with normal saline and gauze, blotted dry.  Recommending to continue quality skin care care and topical treatment as ordered.  Patient declines possibility of compression therapy.    Patient denies any additional areas of skin breakdown and refuses to have skin folds and posterior aspect of skin assessed.  Recommend to continue quality skin care and topical treatment as ordered.    Impression: Chronic discoloration to bilateral lower legs.  Raised areas to the left calf and right anterior lower leg.  Two areas of open tissue to right lower abdomen with serous drainage.    Short term goals: Regain skin integrity, skin protection, moisture prevention, quality skin care and hygiene, topical treatment, daily dressing change.    Greer North RN    9/6/2024    13:28 EDT

## 2024-09-06 NOTE — PLAN OF CARE
Goal Outcome Evaluation:  Plan of Care Reviewed With: patient        Progress: no change  Outcome Evaluation: Alert and oriented; calls for assist as needed. Utilizes urinal at bedside; has had incontinent episodes of stool. Medicated for left hip pain with Motrin, Baclofen and Percocet; see MAR for times. Blood sugar 177 at bedtime; covered with insulin per MAR. Continues to refuse turns. Call light within reach; care plan ongoing.

## 2024-09-07 LAB
GLUCOSE BLDC GLUCOMTR-MCNC: 127 MG/DL (ref 70–99)
GLUCOSE BLDC GLUCOMTR-MCNC: 144 MG/DL (ref 70–99)
GLUCOSE BLDC GLUCOMTR-MCNC: 169 MG/DL (ref 70–99)
GLUCOSE BLDC GLUCOMTR-MCNC: 216 MG/DL (ref 70–99)

## 2024-09-07 PROCEDURE — 63710000001 INSULIN GLARGINE PER 5 UNITS: Performed by: PHYSICIAN ASSISTANT

## 2024-09-07 PROCEDURE — 82948 REAGENT STRIP/BLOOD GLUCOSE: CPT

## 2024-09-07 PROCEDURE — 63710000001 INSULIN LISPRO (HUMAN) PER 5 UNITS: Performed by: PHYSICIAN ASSISTANT

## 2024-09-07 RX ADMIN — CHOLESTYRAMINE 4 G: 4 POWDER, FOR SUSPENSION ORAL at 09:40

## 2024-09-07 RX ADMIN — INSULIN GLARGINE 22 UNITS: 100 INJECTION, SOLUTION SUBCUTANEOUS at 09:40

## 2024-09-07 RX ADMIN — OXYCODONE HYDROCHLORIDE AND ACETAMINOPHEN 1 TABLET: 7.5; 325 TABLET ORAL at 20:41

## 2024-09-07 RX ADMIN — SERTRALINE HYDROCHLORIDE 50 MG: 50 TABLET ORAL at 20:43

## 2024-09-07 RX ADMIN — FUROSEMIDE 40 MG: 40 TABLET ORAL at 09:40

## 2024-09-07 RX ADMIN — DICLOFENAC SODIUM 4 G: 10 GEL TOPICAL at 08:20

## 2024-09-07 RX ADMIN — FUROSEMIDE 40 MG: 40 TABLET ORAL at 17:35

## 2024-09-07 RX ADMIN — ATORVASTATIN CALCIUM 10 MG: 10 TABLET, FILM COATED ORAL at 20:43

## 2024-09-07 RX ADMIN — PREGABALIN 100 MG: 100 CAPSULE ORAL at 20:43

## 2024-09-07 RX ADMIN — MONTELUKAST 10 MG: 10 TABLET, FILM COATED ORAL at 20:43

## 2024-09-07 RX ADMIN — BACLOFEN 10 MG: 10 TABLET ORAL at 20:41

## 2024-09-07 RX ADMIN — BACLOFEN 10 MG: 10 TABLET ORAL at 04:20

## 2024-09-07 RX ADMIN — EMPAGLIFLOZIN 10 MG: 10 TABLET, FILM COATED ORAL at 09:41

## 2024-09-07 RX ADMIN — INSULIN LISPRO 3 UNITS: 100 INJECTION, SOLUTION INTRAVENOUS; SUBCUTANEOUS at 12:08

## 2024-09-07 RX ADMIN — CYANOCOBALAMIN TAB 500 MCG 1000 MCG: 500 TAB at 09:40

## 2024-09-07 RX ADMIN — INSULIN GLARGINE 22 UNITS: 100 INJECTION, SOLUTION SUBCUTANEOUS at 20:43

## 2024-09-07 RX ADMIN — LISINOPRIL 2.5 MG: 2.5 TABLET ORAL at 09:40

## 2024-09-07 RX ADMIN — DICLOFENAC SODIUM 4 G: 10 GEL TOPICAL at 20:15

## 2024-09-07 RX ADMIN — OXYCODONE HYDROCHLORIDE AND ACETAMINOPHEN 1 TABLET: 7.5; 325 TABLET ORAL at 12:33

## 2024-09-07 RX ADMIN — Medication 500 MG: at 09:41

## 2024-09-07 RX ADMIN — INSULIN LISPRO 5 UNITS: 100 INJECTION, SOLUTION INTRAVENOUS; SUBCUTANEOUS at 17:36

## 2024-09-07 RX ADMIN — FERROUS SULFATE TAB 325 MG (65 MG ELEMENTAL FE) 325 MG: 325 (65 FE) TAB at 08:20

## 2024-09-07 RX ADMIN — Medication 500 MG: at 20:43

## 2024-09-07 RX ADMIN — PREGABALIN 100 MG: 100 CAPSULE ORAL at 16:56

## 2024-09-07 RX ADMIN — PREGABALIN 100 MG: 100 CAPSULE ORAL at 09:40

## 2024-09-07 RX ADMIN — IBUPROFEN 400 MG: 400 TABLET ORAL at 01:13

## 2024-09-07 RX ADMIN — BACLOFEN 10 MG: 10 TABLET ORAL at 12:33

## 2024-09-07 RX ADMIN — Medication 10 MG: at 20:43

## 2024-09-07 RX ADMIN — APIXABAN 5 MG: 5 TABLET, FILM COATED ORAL at 09:41

## 2024-09-07 RX ADMIN — OXYCODONE HYDROCHLORIDE AND ACETAMINOPHEN 1 TABLET: 7.5; 325 TABLET ORAL at 04:20

## 2024-09-07 RX ADMIN — CETIRIZINE HYDROCHLORIDE 10 MG: 10 TABLET, FILM COATED ORAL at 09:40

## 2024-09-07 RX ADMIN — APIXABAN 5 MG: 5 TABLET, FILM COATED ORAL at 20:43

## 2024-09-07 RX ADMIN — DICLOFENAC SODIUM 4 G: 10 GEL TOPICAL at 02:11

## 2024-09-07 NOTE — PLAN OF CARE
Goal Outcome Evaluation:  Plan of Care Reviewed With: patient        Progress: no change  Outcome Evaluation: AQX4, no new problems voiced this shift, Buttocks are red excoriated blanchable on inner area of [place]. cheecks . Pain medication given Norco 2x and ibuprofen 1x. No total  relief noted but voiced pain at excepable level. personal belongings and call light within reach at bedside. Continue POC.

## 2024-09-07 NOTE — PLAN OF CARE
Goal Outcome Evaluation:  Plan of Care Reviewed With: patient        Progress: improving  Outcome Evaluation: Alert and oriented and pleasant with staff. x2 max assist for transfers and ambulation. x1 admin PRN pain medication with relief of symptoms noted. No significant changes noted this shift. Sitting up in bed call light in reach.

## 2024-09-08 LAB
GLUCOSE BLDC GLUCOMTR-MCNC: 113 MG/DL (ref 70–99)
GLUCOSE BLDC GLUCOMTR-MCNC: 116 MG/DL (ref 70–99)
GLUCOSE BLDC GLUCOMTR-MCNC: 137 MG/DL (ref 70–99)
GLUCOSE BLDC GLUCOMTR-MCNC: 143 MG/DL (ref 70–99)

## 2024-09-08 PROCEDURE — 63710000001 INSULIN GLARGINE PER 5 UNITS: Performed by: PHYSICIAN ASSISTANT

## 2024-09-08 PROCEDURE — 82948 REAGENT STRIP/BLOOD GLUCOSE: CPT

## 2024-09-08 RX ADMIN — LISINOPRIL 2.5 MG: 2.5 TABLET ORAL at 09:31

## 2024-09-08 RX ADMIN — CYANOCOBALAMIN TAB 500 MCG 1000 MCG: 500 TAB at 09:31

## 2024-09-08 RX ADMIN — OXYCODONE HYDROCHLORIDE AND ACETAMINOPHEN 1 TABLET: 7.5; 325 TABLET ORAL at 04:53

## 2024-09-08 RX ADMIN — CHOLESTYRAMINE 4 G: 4 POWDER, FOR SUSPENSION ORAL at 09:34

## 2024-09-08 RX ADMIN — APIXABAN 5 MG: 5 TABLET, FILM COATED ORAL at 20:05

## 2024-09-08 RX ADMIN — PREGABALIN 100 MG: 100 CAPSULE ORAL at 20:05

## 2024-09-08 RX ADMIN — Medication 500 MG: at 09:30

## 2024-09-08 RX ADMIN — PREGABALIN 100 MG: 100 CAPSULE ORAL at 16:48

## 2024-09-08 RX ADMIN — BACLOFEN 10 MG: 10 TABLET ORAL at 04:53

## 2024-09-08 RX ADMIN — PREGABALIN 100 MG: 100 CAPSULE ORAL at 09:30

## 2024-09-08 RX ADMIN — FERROUS SULFATE TAB 325 MG (65 MG ELEMENTAL FE) 325 MG: 325 (65 FE) TAB at 08:12

## 2024-09-08 RX ADMIN — INSULIN GLARGINE 22 UNITS: 100 INJECTION, SOLUTION SUBCUTANEOUS at 09:30

## 2024-09-08 RX ADMIN — Medication 10 MG: at 20:05

## 2024-09-08 RX ADMIN — SERTRALINE HYDROCHLORIDE 50 MG: 50 TABLET ORAL at 20:05

## 2024-09-08 RX ADMIN — APIXABAN 5 MG: 5 TABLET, FILM COATED ORAL at 09:30

## 2024-09-08 RX ADMIN — FUROSEMIDE 40 MG: 40 TABLET ORAL at 09:31

## 2024-09-08 RX ADMIN — INSULIN GLARGINE 22 UNITS: 100 INJECTION, SOLUTION SUBCUTANEOUS at 20:05

## 2024-09-08 RX ADMIN — FUROSEMIDE 40 MG: 40 TABLET ORAL at 17:47

## 2024-09-08 RX ADMIN — MONTELUKAST 10 MG: 10 TABLET, FILM COATED ORAL at 20:05

## 2024-09-08 RX ADMIN — OXYCODONE HYDROCHLORIDE AND ACETAMINOPHEN 1 TABLET: 7.5; 325 TABLET ORAL at 12:58

## 2024-09-08 RX ADMIN — CETIRIZINE HYDROCHLORIDE 10 MG: 10 TABLET, FILM COATED ORAL at 09:30

## 2024-09-08 RX ADMIN — OXYCODONE HYDROCHLORIDE AND ACETAMINOPHEN 1 TABLET: 7.5; 325 TABLET ORAL at 21:17

## 2024-09-08 RX ADMIN — ATORVASTATIN CALCIUM 10 MG: 10 TABLET, FILM COATED ORAL at 20:05

## 2024-09-08 RX ADMIN — IBUPROFEN 400 MG: 400 TABLET ORAL at 16:50

## 2024-09-08 RX ADMIN — BACLOFEN 10 MG: 10 TABLET ORAL at 12:57

## 2024-09-08 RX ADMIN — Medication 500 MG: at 20:04

## 2024-09-08 RX ADMIN — BISMUTH SUBSALICYLATE 524 MG: 262 TABLET, CHEWABLE ORAL at 02:12

## 2024-09-08 RX ADMIN — DICLOFENAC SODIUM 4 G: 10 GEL TOPICAL at 13:01

## 2024-09-08 RX ADMIN — BACLOFEN 10 MG: 10 TABLET ORAL at 21:17

## 2024-09-08 RX ADMIN — DICLOFENAC SODIUM 4 G: 10 GEL TOPICAL at 20:04

## 2024-09-08 RX ADMIN — EMPAGLIFLOZIN 10 MG: 10 TABLET, FILM COATED ORAL at 09:31

## 2024-09-08 NOTE — PLAN OF CARE
Goal Outcome Evaluation:  Plan of Care Reviewed With: patient        Progress: no change  Outcome Evaluation: no changes this shift . Pain medication given x2 this shift. pain decreased to tolerable level. call light and personal items within reach at bedside.

## 2024-09-08 NOTE — PLAN OF CARE
Goal Outcome Evaluation:  Plan of Care Reviewed With: patient        Progress: improving  Outcome Evaluation: Alert and oriented and pleasant with staff this shift. x2 max assist for transfers and ambulation. x2 admin PRN pain medication with relief ofd symptoms noted. No siginificant changes noted. Sitting up in bed, call light in reach.

## 2024-09-09 VITALS
SYSTOLIC BLOOD PRESSURE: 107 MMHG | OXYGEN SATURATION: 100 % | BODY MASS INDEX: 40.43 KG/M2 | DIASTOLIC BLOOD PRESSURE: 65 MMHG | TEMPERATURE: 97.3 F | HEIGHT: 74 IN | RESPIRATION RATE: 18 BRPM | WEIGHT: 315 LBS | HEART RATE: 73 BPM

## 2024-09-09 LAB
GLUCOSE BLDC GLUCOMTR-MCNC: 109 MG/DL (ref 70–99)
GLUCOSE BLDC GLUCOMTR-MCNC: 113 MG/DL (ref 70–99)
GLUCOSE BLDC GLUCOMTR-MCNC: 150 MG/DL (ref 70–99)
GLUCOSE BLDC GLUCOMTR-MCNC: 190 MG/DL (ref 70–99)
SARS-COV-2 RNA RESP QL NAA+PROBE: NOT DETECTED

## 2024-09-09 PROCEDURE — 63710000001 ONDANSETRON ODT 4 MG TABLET DISPERSIBLE: Performed by: PHYSICIAN ASSISTANT

## 2024-09-09 PROCEDURE — 63710000001 INSULIN LISPRO (HUMAN) PER 5 UNITS: Performed by: PHYSICIAN ASSISTANT

## 2024-09-09 PROCEDURE — 63710000001 INSULIN GLARGINE PER 5 UNITS: Performed by: PHYSICIAN ASSISTANT

## 2024-09-09 RX ADMIN — DICLOFENAC SODIUM 4 G: 10 GEL TOPICAL at 02:34

## 2024-09-09 RX ADMIN — CETIRIZINE HYDROCHLORIDE 10 MG: 10 TABLET, FILM COATED ORAL at 09:03

## 2024-09-09 RX ADMIN — CYANOCOBALAMIN TAB 500 MCG 1000 MCG: 500 TAB at 09:03

## 2024-09-09 RX ADMIN — INSULIN LISPRO 3 UNITS: 100 INJECTION, SOLUTION INTRAVENOUS; SUBCUTANEOUS at 12:16

## 2024-09-09 RX ADMIN — FERROUS SULFATE TAB 325 MG (65 MG ELEMENTAL FE) 325 MG: 325 (65 FE) TAB at 09:03

## 2024-09-09 RX ADMIN — DICLOFENAC SODIUM 4 G: 10 GEL TOPICAL at 09:04

## 2024-09-09 RX ADMIN — PREGABALIN 100 MG: 100 CAPSULE ORAL at 21:24

## 2024-09-09 RX ADMIN — LISINOPRIL 2.5 MG: 2.5 TABLET ORAL at 09:03

## 2024-09-09 RX ADMIN — APIXABAN 5 MG: 5 TABLET, FILM COATED ORAL at 21:24

## 2024-09-09 RX ADMIN — FUROSEMIDE 40 MG: 40 TABLET ORAL at 18:17

## 2024-09-09 RX ADMIN — BACLOFEN 10 MG: 10 TABLET ORAL at 21:24

## 2024-09-09 RX ADMIN — DICLOFENAC SODIUM 4 G: 10 GEL TOPICAL at 21:24

## 2024-09-09 RX ADMIN — PREGABALIN 100 MG: 100 CAPSULE ORAL at 09:03

## 2024-09-09 RX ADMIN — INSULIN GLARGINE 22 UNITS: 100 INJECTION, SOLUTION SUBCUTANEOUS at 21:24

## 2024-09-09 RX ADMIN — SERTRALINE HYDROCHLORIDE 50 MG: 50 TABLET ORAL at 21:24

## 2024-09-09 RX ADMIN — MONTELUKAST 10 MG: 10 TABLET, FILM COATED ORAL at 21:24

## 2024-09-09 RX ADMIN — INSULIN GLARGINE 22 UNITS: 100 INJECTION, SOLUTION SUBCUTANEOUS at 09:03

## 2024-09-09 RX ADMIN — CHOLESTYRAMINE 4 G: 4 POWDER, FOR SUSPENSION ORAL at 09:03

## 2024-09-09 RX ADMIN — BISMUTH SUBSALICYLATE 524 MG: 262 TABLET, CHEWABLE ORAL at 02:34

## 2024-09-09 RX ADMIN — OXYCODONE HYDROCHLORIDE AND ACETAMINOPHEN 1 TABLET: 7.5; 325 TABLET ORAL at 21:24

## 2024-09-09 RX ADMIN — BACLOFEN 10 MG: 10 TABLET ORAL at 13:16

## 2024-09-09 RX ADMIN — BACLOFEN 10 MG: 10 TABLET ORAL at 05:06

## 2024-09-09 RX ADMIN — APIXABAN 5 MG: 5 TABLET, FILM COATED ORAL at 09:03

## 2024-09-09 RX ADMIN — OXYCODONE HYDROCHLORIDE AND ACETAMINOPHEN 1 TABLET: 7.5; 325 TABLET ORAL at 05:06

## 2024-09-09 RX ADMIN — EMPAGLIFLOZIN 10 MG: 10 TABLET, FILM COATED ORAL at 09:03

## 2024-09-09 RX ADMIN — Medication 500 MG: at 21:23

## 2024-09-09 RX ADMIN — OXYCODONE HYDROCHLORIDE AND ACETAMINOPHEN 1 TABLET: 7.5; 325 TABLET ORAL at 13:16

## 2024-09-09 RX ADMIN — ONDANSETRON 4 MG: 4 TABLET, ORALLY DISINTEGRATING ORAL at 12:16

## 2024-09-09 RX ADMIN — ATORVASTATIN CALCIUM 10 MG: 10 TABLET, FILM COATED ORAL at 21:24

## 2024-09-09 RX ADMIN — DICLOFENAC SODIUM 4 G: 10 GEL TOPICAL at 15:20

## 2024-09-09 RX ADMIN — INSULIN LISPRO 3 UNITS: 100 INJECTION, SOLUTION INTRAVENOUS; SUBCUTANEOUS at 21:24

## 2024-09-09 RX ADMIN — Medication 500 MG: at 09:03

## 2024-09-09 RX ADMIN — FUROSEMIDE 40 MG: 40 TABLET ORAL at 09:03

## 2024-09-09 RX ADMIN — PREGABALIN 100 MG: 100 CAPSULE ORAL at 15:20

## 2024-09-09 RX ADMIN — Medication 10 MG: at 21:24

## 2024-09-09 NOTE — CONSULTS
"Nutrition Services    Patient Name: Jose Shaikh  YOB: 1960  MRN: [id number]  Admission date: 11/6/2023      CLINICAL NUTRITION ASSESSMENT      Reason for Assessment  Follow Up   H&P:  Past Medical History:   Diagnosis Date    Absence of toe of right foot     Acute osteomyelitis of left calcaneus  08/18/2021    Anxiety and depression     Arthritis     Cancer     Chronic pain     STATES HIS PAIN IS 10/10 AAT    Claustrophobia     Corns and callus     Diabetic ulcer of left heel associated with type 2 DM 08/18/2021    Diabetic ulcer of left heel associated with type 2 DM 07/06/2021    Diabetic ulcer of right midfoot associated with type 2 DM 08/18/2021    Difficulty walking     Essential hypertension 08/31/2021    Hammertoe     Hyperlipidemia LDL goal <100 08/31/2021    Ingrown toenail     Obesity     Paroxysmal atrial fibrillation 08/31/2021    Polyneuropathy     Pressure ulcer, stage 1     Tinea unguium     Type 2 diabetes mellitus with polyneuropathy         Current Problems:   Active Hospital Problems    Diagnosis     **Debility     Noncompliance of patient with dietary regimen     Ulcerated, foot, left, limited to breakdown of skin     Onychomycosis     Ulcer of right foot         Nutrition/Diet History         Narrative   Monthly follow up for long-term SNF patient. Pt regularly orders [employer]. No new wt since 8/5/24, often refuses care and turning and has refused to be weighed. Ozempic stopped last week, per provider was likely cause of diarrhea.           Anthropometrics        Current Height, Weight Height: 188 cm (74.02\")  Weight:  (refused, said already had breakfast, said he will do this in the morning)   Current BMI Body mass index is 44.79 kg/m².   BMI Classification Obese Class III   % %   Adjusted Body Weight (ABW) 101 kg   Weight Hx  Wt Readings from Last 30 Encounters:   08/05/24 0732 (!) 158 kg (348 lb 15.8 oz)   07/22/24 0800 (!) 156 kg (344 lb 9.3 oz)   07/08/24 0900 (!) " 156 kg (343 lb 14.7 oz)   06/27/24 0745 (!) 156 kg (343 lb 4.1 oz)   05/31/24 1400 (!) 151 kg (333 lb 12.4 oz)   05/22/24 1000 (!) 157 kg (346 lb 2 oz)   05/15/24 0545 (!) 153 kg (338 lb 3 oz)   05/08/24 1100 (!) 154 kg (340 lb 6.2 oz)   05/08/24 1029 (!) 154 kg (340 lb 6.2 oz)   05/01/24 1100 (!) 156 kg (343 lb 14.7 oz)   04/24/24 0900 (!) 159 kg (350 lb 1.5 oz)   04/17/24 1124 (!) 161 kg (355 lb 9.6 oz)   04/11/24 1509 (!) 162 kg (356 lb 11.3 oz)   04/02/24 1118 (!) 157 kg (345 lb 0.3 oz)   03/26/24 1003 (!) 143 kg (314 lb 2.5 oz)   03/18/24 1323 (!) 151 kg (332 lb 3.7 oz)   03/18/24 0500 (!) 171 kg (378 lb 1.4 oz)   03/17/24 0500 (!) 173 kg (380 lb 15.3 oz)   03/16/24 0500 (!) 171 kg (376 lb 15.8 oz)   03/15/24 0500 (!) 161 kg (354 lb 8 oz)   03/14/24 0300 (!) 173 kg (382 lb 4.4 oz)   03/12/24 0500 (!) 158 kg (347 lb 14.2 oz)   03/11/24 0500 (!) 153 kg (337 lb 8.4 oz)   03/10/24 0540 (!) 154 kg (339 lb 4.6 oz)   03/09/24 0500 (!) 153 kg (337 lb 1.3 oz)   03/08/24 1323 (!) 153 kg (337 lb 8 oz)   03/08/24 0500 (!) 157 kg (346 lb 2 oz)   03/06/24 2300 (!) 155 kg (342 lb 2.5 oz)   03/05/24 0415 (!) 155 kg (342 lb 13 oz)   03/04/24 0500 (!) 156 kg (344 lb 9.3 oz)   03/03/24 0500 (!) 156 kg (344 lb 9.3 oz)   03/02/24 0530 (!) 157 kg (346 lb 12.5 oz)   03/01/24 0500 (!) 157 kg (345 lb 3.9 oz)   02/29/24 0500 (!) 157 kg (347 lb 3.6 oz)   02/28/24 0509 (!) 158 kg (347 lb 14.2 oz)   02/27/24 0500 (!) 159 kg (351 lb 3.1 oz)   02/26/24 0500 (!) 159 kg (350 lb 12 oz)   02/25/24 0500 (!) 160 kg (351 lb 10.1 oz)   02/24/24 0500 (!) 160 kg (352 lb 1.2 oz)   02/23/24 0500 (!) 160 kg (353 lb 6.4 oz)   02/22/24 0500 (!) 160 kg (353 lb 13.4 oz)   02/21/24 0514 (!) 162 kg (356 lb 7.7 oz)   02/20/24 0500 (!) 161 kg (355 lb 2.6 oz)   02/19/24 0300 (!) 160 kg (353 lb 6.4 oz)   02/18/24 0500 (!) 162 kg (356 lb 7.7 oz)   02/17/24 0500 (!) 162 kg (357 lb 2.3 oz)   02/16/24 0500 (!) 162 kg (358 lb 0.4 oz)   02/15/24 0532 (!) 163 kg (360 lb  3.7 oz)   02/14/24 0600 (!) 162 kg (357 lb 5.9 oz)   02/13/24 0500 (!) 162 kg (357 lb 9.4 oz)   02/12/24 1352 (!) 160 kg (352 lb 4.7 oz)   02/12/24 0500 (!) 166 kg (367 lb 1.1 oz)   02/11/24 0500 (!) 169 kg (372 lb 2.2 oz)   02/10/24 0500 (!) 168 kg (370 lb 9.5 oz)   02/09/24 0600 (!) 168 kg (370 lb 9.5 oz)   02/08/24 0600 (!) 165 kg (363 lb 15.7 oz)   02/07/24 0600 (!) 166 kg (366 lb 10 oz)   02/06/24 0419 (!) 166 kg (366 lb 10 oz)   02/05/24 0500 (!) 167 kg (367 lb 4.6 oz)   02/04/24 0500 (!) 167 kg (367 lb 8.1 oz)   02/03/24 0500 (!) 166 kg (366 lb 6.5 oz)   02/02/24 0500 (!) 168 kg (369 lb 14.9 oz)   02/01/24 0613 (!) 169 kg (372 lb 5.7 oz)   01/31/24 0500 (!) 168 kg (371 lb 4.1 oz)   01/30/24 0500 (!) 169 kg (372 lb 12.8 oz)   01/29/24 0232 (!) 169 kg (372 lb 5.7 oz)   01/28/24 0500 (!) 167 kg (368 lb 6.2 oz)   01/26/24 0400 (!) 169 kg (372 lb 2.2 oz)   01/25/24 0417 (!) 167 kg (368 lb 9.8 oz)   01/24/24 0608 (!) 168 kg (371 lb 7.6 oz)   01/23/24 0500 (!) 168 kg (369 lb 14.9 oz)   01/22/24 0500 (!) 168 kg (371 lb 4.1 oz)   01/21/24 0500 (!) 168 kg (371 lb 4.1 oz)   01/20/24 0500 (!) 168 kg (371 lb 7.6 oz)   01/19/24 0500 (!) 171 kg (376 lb 1.7 oz)   01/18/24 0500 (!) 172 kg (380 lb 1.2 oz)   01/17/24 0614 (!) 172 kg (379 lb 6.6 oz)   01/16/24 0500 (!) 171 kg (378 lb 1.4 oz)   01/15/24 0500 (!) 169 kg (372 lb 2.2 oz)   01/14/24 0546 (!) 169 kg (373 lb 7.4 oz)   01/13/24 0507 (!) 171 kg (376 lb 5.2 oz)   01/12/24 0600 (!) 170 kg (374 lb 12.5 oz)   01/11/24 0600 (!) 169 kg (371 lb 14.7 oz)   01/10/24 0600 (!) 169 kg (371 lb 11.1 oz)   01/09/24 0500 (!) 168 kg (370 lb 9.5 oz)   01/08/24 0448 (!) 168 kg (370 lb 9.5 oz)   01/07/24 0454 (!) 167 kg (367 lb 15.2 oz)   01/06/24 0500 (!) 166 kg (366 lb 10 oz)   01/05/24 0555 (!) 165 kg (364 lb 6.7 oz)   01/04/24 0500 (!) 165 kg (363 lb 12.1 oz)   01/03/24 0500 (!) 166 kg (365 lb 1.3 oz)   01/02/24 0500 (!) 167 kg (367 lb 4.6 oz)   01/01/24 0500 (!) 166 kg (365 lb 11.9 oz)    12/31/23 0500 (!) 160 kg (352 lb 4.7 oz)   12/30/23 0500 (!) 167 kg (367 lb 15.2 oz)   12/29/23 0500 (!) 166 kg (366 lb 10 oz)   12/28/23 0500 (!) 165 kg (364 lb 13.8 oz)   12/27/23 0500 (!) 166 kg (367 lb 1.1 oz)   12/26/23 0500 (!) 167 kg (367 lb 4.6 oz)   12/25/23 0500 (!) 165 kg (364 lb 3.2 oz)   12/24/23 0500 (!) 164 kg (362 lb 7 oz)   12/23/23 0500 (!) 163 kg (360 lb 0.2 oz)   12/22/23 0600 (!) 163 kg (358 lb 14.5 oz)   12/21/23 0500 (!) 163 kg (359 lb 12.7 oz)   12/20/23 0500 (!) 162 kg (357 lb 9.4 oz)   12/19/23 0550 (!) 163 kg (359 lb 5.6 oz)   12/18/23 0500 (!) 161 kg (355 lb 13.2 oz)   12/17/23 0500 (!) 160 kg (353 lb 13.4 oz)   12/16/23 1541 (!) 160 kg (353 lb 3.2 oz)   12/16/23 0500 (!) 165 kg (364 lb 13.8 oz)   12/15/23 0500 (!) 165 kg (362 lb 10.5 oz)   12/14/23 0500 (!) 157 kg (345 lb 14.4 oz)   12/13/23 0500 (!) 153 kg (337 lb 4.9 oz)   12/12/23 0500 (!) 155 kg (341 lb 0.8 oz)   12/11/23 1049 (!) 155 kg (341 lb 0.8 oz)   12/11/23 0500 (!) 160 kg (353 lb 13.4 oz)   12/10/23 0532 (!) 162 kg (356 lb 7.7 oz)   12/09/23 0500 (!) 151 kg (332 lb 10.8 oz)   12/08/23 0500 (!) 153 kg (336 lb 13.8 oz)   12/06/23 0500 (!) 154 kg (340 lb 6.2 oz)   12/05/23 0607 (!) 161 kg (354 lb 15.1 oz)   12/04/23 0500 (!) 161 kg (354 lb 4.5 oz)   12/03/23 0400 (!) 161 kg (354 lb 11.5 oz)   11/21/23 1155 (!) 162 kg (357 lb 12.8 oz)   11/09/23 0500 (!) 160 kg (353 lb 9.9 oz)   11/06/23 1422 (!) 158 kg (348 lb 5.2 oz)   11/02/23 1155 (!) 158 kg (348 lb 15.8 oz)   09/11/23 0030 (!) 151 kg (334 lb)   09/10/23 1844 (!) 154 kg (338 lb 10 oz)   08/30/23 1112 (!) 157 kg (347 lb)   08/01/23 0932 (!) 158 kg (347 lb 10.7 oz)   07/31/23 2347 (!) 163 kg (358 lb 7.5 oz)   06/16/23 2333 (!) 155 kg (342 lb 2.5 oz)   06/16/23 1053 (!) 155 kg (342 lb 3.2 oz)   06/15/23 0505 (!) 149 kg (328 lb 14.4 oz)   06/14/23 0455 (!) 149 kg (328 lb 4.8 oz)   06/13/23 1703 (!) 149 kg (328 lb 11.2 oz)   06/13/23 0600 (!) 170 kg (374 lb 12.5 oz)   06/12/23  0420 (!) 160 kg (352 lb 11.8 oz)   06/11/23 0447 (!) 150 kg (331 lb 5.6 oz)   06/10/23 0500 (!) 150 kg (330 lb 14.6 oz)   06/09/23 0536 (!) 156 kg (343 lb 7.6 oz)   06/08/23 1826 (!) 156 kg (344 lb 11.2 oz)   06/08/23 0522 (!) 158 kg (348 lb 8.8 oz)   06/07/23 0548 (!) 155 kg (342 lb 9.5 oz)   06/06/23 0515 (!) 155 kg (341 lb 14.9 oz)   06/05/23 0445 (!) 155 kg (341 lb 9.6 oz)   06/05/23 0348 (!) 158 kg (349 lb 3.3 oz)   06/04/23 0555 (!) 157 kg (346 lb 3.2 oz)   06/03/23 0539 (!) 158 kg (349 lb)   06/02/23 0548 (!) 163 kg (359 lb 3.2 oz)   06/01/23 0600 (!) 164 kg (362 lb)   05/31/23 0507 (!) 164 kg (362 lb 4.8 oz)   05/30/23 0635 (!) 164 kg (362 lb 7 oz)   05/29/23 0500 (!) 167 kg (368 lb 2.7 oz)   05/28/23 0600 (!) 167 kg (367 lb 11.6 oz)   05/27/23 0600 (!) 167 kg (369 lb 0.8 oz)   05/26/23 0529 (!) 167 kg (369 lb 3.2 oz)   05/25/23 0600 (!) 168 kg (370 lb 3.2 oz)   05/24/23 0600 (!) 166 kg (366 lb 9.6 oz)   05/22/23 0529 (!) 169 kg (373 lb 7.4 oz)   05/21/23 0600 (!) 169 kg (371 lb 12.8 oz)   05/20/23 0600 (!) 171 kg (376 lb 5.2 oz)   05/19/23 0300 (!) 168 kg (370 lb 14.4 oz)   05/18/23 1912 (!) 168 kg (371 lb 7.6 oz)   05/18/23 0600 (!) 169 kg (371 lb 11.1 oz)   05/16/23 0700 (!) 171 kg (377 lb 4.8 oz)   05/14/23 0500 (!) 171 kg (377 lb 3.3 oz)   05/12/23 1143 (!) 170 kg (375 lb)   05/06/23 0258 (!) 170 kg (375 lb 8 oz)   04/19/23 0909 (!) 163 kg (359 lb)   04/03/23 1906 (!) 168 kg (370 lb)   03/27/23 0938 (!) 170 kg (373 lb 10.9 oz)   03/17/23 1153 (!) 168 kg (370 lb)   01/27/23 1501 (!) 168 kg (370 lb)   12/22/22 1501 (!) 171 kg (376 lb)   11/08/22 1035 (!) 161 kg (355 lb)   10/01/22 1141 (!) 164 kg (360 lb 10.8 oz)   05/18/22 1311 (!) 155 kg (341 lb)   03/24/22 1432 (!) 155 kg (341 lb)   03/02/22 1412 (!) 155 kg (341 lb)   01/12/22 1317 (!) 155 kg (341 lb)   12/30/21 1431 (!) 155 kg (341 lb)   12/01/21 1144 (!) 155 kg (341 lb 11.4 oz)   12/01/21 0843 (!) 157 kg (346 lb)   11/22/21 0839 (!) 157 kg (346 lb)  "  11/15/21 1148 (!) 157 kg (346 lb 12.5 oz)   11/09/21 1139 (!) 157 kg (345 lb 7.4 oz)   11/05/21 1130 (!) 159 kg (351 lb 3.1 oz)   11/02/21 1121 (!) 161 kg (356 lb)   10/27/21 1048 (!) 161 kg (356 lb)   10/21/21 1418 (!) 162 kg (356 lb 14.8 oz)          Wt Change Observation Wt loss on Ozempic, no updated wt d/t pt refusal     Estimated/Assessed Needs  Estimated Needs based on: Adjusted Body Weight       Energy Requirements 25 kcal/kg    EST Needs (kcal/day) 2600       Protein Requirements 1.0-1.2 g/kg   EST Daily Needs (g/day) 104-125       Fluid Requirements 25 ml/kg    Estimated Needs (mL/day) 2600     Labs/Medications         Pertinent Labs Reviewed.         Invalid input(s): \"LABALBU\", \"PROT\"            COVID19   Date Value Ref Range Status   09/09/2024 Not Detected Not Detected - Ref. Range Final     Lab Results   Component Value Date    HGBA1C 4.40 (L) 08/28/2024         Pertinent Medications Reviewed.     Malnutrition Severity Assessment              Nutrition Diagnosis         Nutrition Dx Problem 1 Increased nutrient needs related to increased protein demand as evidenced by impaired skin integrity.     Nutrition Intervention           Current Nutrition Orders & Evaluation of Intake       Current PO Diet Diet: Diabetic Diets, High Protein Diet, Cardiac; Low Sodium (2g); Texture: Regular Texture (IDDSI 7); Fluid Consistency: Thin (IDDSI 0)   Supplement Orders Placed This Encounter      DIET MESSAGE Pt is allowed 2 plant-based burgers per day Tiff Orellana RD           Nutrition Intervention/Prescription        High protein diet   Plant-based burger okay. 2 per day = 560 mg Na+        Medical Nutrition Therapy/Nutrition Education          Learner     Readiness Patient  N/A     Method     Response N/A  N/A     Monitor/Evaluation        Monitor PO intake, Weight, Skin status     Nutrition Discharge Plan         Continue high protein diet upon discharge      Electronically signed by:  Uyen Duvall RD  09/09/24 " 15:34 EDT

## 2024-09-09 NOTE — PLAN OF CARE
"Goal Outcome Evaluation:  Plan of Care Reviewed With: patient        Progress: no change  Outcome Evaluation: Resident alert, oriented, able to make needs known to staff. Remains bedbound, lt arm and shoulder painful, Voltaren gel applied. Medicated for prn pain x1 with Baclofen and Percocet, effective. Resident slept most of shift. had to call out his name 3 times on several occasion to get resident alert enough for either blood sugars or meds. voiced concern over giving him pain med being so groggy, resident replied \"you WILL give me my pain med when I ask for them! I just stayed up all night watching TV\". Resident remains incontinent of stool. Refused rehab exercises with PCA. Will continue current POC.                               "

## 2024-09-09 NOTE — PLAN OF CARE
Goal Outcome Evaluation:  Plan of Care Reviewed With: patient        Progress: no change  Outcome Evaluation: Alert and oriented X 4. No significant change. Continues to make frequent requests. Incontinent of stool. Uses trapeze for repositioning. Baclofen and percocet administered for left shoulder and hip pain. Continue plan of care.

## 2024-09-10 LAB
GLUCOSE BLDC GLUCOMTR-MCNC: 113 MG/DL (ref 70–99)
GLUCOSE BLDC GLUCOMTR-MCNC: 151 MG/DL (ref 70–99)
GLUCOSE BLDC GLUCOMTR-MCNC: 173 MG/DL (ref 70–99)
GLUCOSE BLDC GLUCOMTR-MCNC: 86 MG/DL (ref 70–99)

## 2024-09-11 LAB
GLUCOSE BLDC GLUCOMTR-MCNC: 145 MG/DL (ref 70–99)
GLUCOSE BLDC GLUCOMTR-MCNC: 147 MG/DL (ref 70–99)
GLUCOSE BLDC GLUCOMTR-MCNC: 176 MG/DL (ref 70–99)
GLUCOSE BLDC GLUCOMTR-MCNC: 97 MG/DL (ref 70–99)

## 2024-09-12 LAB
GLUCOSE BLDC GLUCOMTR-MCNC: 108 MG/DL (ref 70–99)
GLUCOSE BLDC GLUCOMTR-MCNC: 115 MG/DL (ref 70–99)
GLUCOSE BLDC GLUCOMTR-MCNC: 181 MG/DL (ref 70–99)
GLUCOSE BLDC GLUCOMTR-MCNC: 272 MG/DL (ref 70–99)
SARS-COV-2 RNA RESP QL NAA+PROBE: NOT DETECTED

## 2024-09-13 LAB
GLUCOSE BLDC GLUCOMTR-MCNC: 105 MG/DL (ref 70–99)
GLUCOSE BLDC GLUCOMTR-MCNC: 142 MG/DL (ref 70–99)
GLUCOSE BLDC GLUCOMTR-MCNC: 144 MG/DL (ref 70–99)
GLUCOSE BLDC GLUCOMTR-MCNC: 169 MG/DL (ref 70–99)
GLUCOSE BLDC GLUCOMTR-MCNC: 172 MG/DL (ref 70–99)

## 2024-09-14 LAB
GLUCOSE BLDC GLUCOMTR-MCNC: 105 MG/DL (ref 70–99)
GLUCOSE BLDC GLUCOMTR-MCNC: 127 MG/DL (ref 70–99)
GLUCOSE BLDC GLUCOMTR-MCNC: 134 MG/DL (ref 70–99)
GLUCOSE BLDC GLUCOMTR-MCNC: 176 MG/DL (ref 70–99)

## 2024-09-15 LAB
ALBUMIN SERPL-MCNC: 2.6 G/DL (ref 3.5–5.2)
ALBUMIN/GLOB SERPL: 0.9 G/DL
ALP SERPL-CCNC: 182 U/L (ref 39–117)
ALT SERPL W P-5'-P-CCNC: 24 U/L (ref 1–41)
AMPHET+METHAMPHET UR QL: NEGATIVE
ANION GAP SERPL CALCULATED.3IONS-SCNC: 8 MMOL/L (ref 5–15)
AST SERPL-CCNC: 36 U/L (ref 1–40)
BARBITURATES UR QL SCN: NEGATIVE
BENZODIAZ UR QL SCN: NEGATIVE
BILIRUB SERPL-MCNC: 0.4 MG/DL (ref 0–1.2)
BILIRUB UR QL STRIP: NEGATIVE
BUN SERPL-MCNC: 13 MG/DL (ref 8–23)
BUN/CREAT SERPL: 20 (ref 7–25)
CALCIUM SPEC-SCNC: 7.5 MG/DL (ref 8.6–10.5)
CANNABINOIDS SERPL QL: NEGATIVE
CHLORIDE SERPL-SCNC: 104 MMOL/L (ref 98–107)
CLARITY UR: ABNORMAL
CO2 SERPL-SCNC: 23 MMOL/L (ref 22–29)
COCAINE UR QL: NEGATIVE
COLOR UR: YELLOW
CREAT SERPL-MCNC: 0.65 MG/DL (ref 0.76–1.27)
DEPRECATED RDW RBC AUTO: 46.8 FL (ref 37–54)
EGFRCR SERPLBLD CKD-EPI 2021: 104.6 ML/MIN/1.73
ERYTHROCYTE [DISTWIDTH] IN BLOOD BY AUTOMATED COUNT: 15.3 % (ref 12.3–15.4)
FENTANYL UR-MCNC: NEGATIVE NG/ML
GLOBULIN UR ELPH-MCNC: 2.8 GM/DL
GLUCOSE BLDC GLUCOMTR-MCNC: 118 MG/DL (ref 70–99)
GLUCOSE BLDC GLUCOMTR-MCNC: 158 MG/DL (ref 70–99)
GLUCOSE BLDC GLUCOMTR-MCNC: 174 MG/DL (ref 70–99)
GLUCOSE BLDC GLUCOMTR-MCNC: 179 MG/DL (ref 70–99)
GLUCOSE SERPL-MCNC: 125 MG/DL (ref 65–99)
GLUCOSE UR STRIP-MCNC: ABNORMAL MG/DL
HCT VFR BLD AUTO: 33 % (ref 37.5–51)
HGB BLD-MCNC: 10.2 G/DL (ref 13–17.7)
HGB UR QL STRIP.AUTO: NEGATIVE
KETONES UR QL STRIP: NEGATIVE
LEUKOCYTE ESTERASE UR QL STRIP.AUTO: NEGATIVE
MCH RBC QN AUTO: 26.2 PG (ref 26.6–33)
MCHC RBC AUTO-ENTMCNC: 30.9 G/DL (ref 31.5–35.7)
MCV RBC AUTO: 84.8 FL (ref 79–97)
METHADONE UR QL SCN: NEGATIVE
NITRITE UR QL STRIP: NEGATIVE
OPIATES UR QL: NEGATIVE
OXYCODONE UR QL SCN: POSITIVE
PH UR STRIP.AUTO: 6 [PH] (ref 5–8)
PLATELET # BLD AUTO: 100 10*3/MM3 (ref 140–450)
PMV BLD AUTO: 12.8 FL (ref 6–12)
POTASSIUM SERPL-SCNC: 4 MMOL/L (ref 3.5–5.2)
PROT SERPL-MCNC: 5.4 G/DL (ref 6–8.5)
PROT UR QL STRIP: NEGATIVE
RBC # BLD AUTO: 3.89 10*6/MM3 (ref 4.14–5.8)
SODIUM SERPL-SCNC: 135 MMOL/L (ref 136–145)
SP GR UR STRIP: 1.02 (ref 1–1.03)
UROBILINOGEN UR QL STRIP: ABNORMAL
WBC NRBC COR # BLD AUTO: 4.51 10*3/MM3 (ref 3.4–10.8)

## 2024-09-16 LAB
GLUCOSE BLDC GLUCOMTR-MCNC: 134 MG/DL (ref 70–99)
GLUCOSE BLDC GLUCOMTR-MCNC: 137 MG/DL (ref 70–99)
GLUCOSE BLDC GLUCOMTR-MCNC: 142 MG/DL (ref 70–99)
GLUCOSE BLDC GLUCOMTR-MCNC: 151 MG/DL (ref 70–99)
SARS-COV-2 RNA RESP QL NAA+PROBE: NOT DETECTED

## 2024-09-17 LAB
GLUCOSE BLDC GLUCOMTR-MCNC: 126 MG/DL (ref 70–99)
GLUCOSE BLDC GLUCOMTR-MCNC: 130 MG/DL (ref 70–99)
GLUCOSE BLDC GLUCOMTR-MCNC: 132 MG/DL (ref 70–99)
GLUCOSE BLDC GLUCOMTR-MCNC: 134 MG/DL (ref 70–99)

## 2024-09-18 LAB
GLUCOSE BLDC GLUCOMTR-MCNC: 124 MG/DL (ref 70–99)
GLUCOSE BLDC GLUCOMTR-MCNC: 130 MG/DL (ref 70–99)
GLUCOSE BLDC GLUCOMTR-MCNC: 144 MG/DL (ref 70–99)
GLUCOSE BLDC GLUCOMTR-MCNC: 159 MG/DL (ref 70–99)

## 2024-09-19 LAB
GLUCOSE BLDC GLUCOMTR-MCNC: 121 MG/DL (ref 70–99)
GLUCOSE BLDC GLUCOMTR-MCNC: 177 MG/DL (ref 70–99)
GLUCOSE BLDC GLUCOMTR-MCNC: 246 MG/DL (ref 70–99)
GLUCOSE BLDC GLUCOMTR-MCNC: 87 MG/DL (ref 70–99)
SARS-COV-2 RNA RESP QL NAA+PROBE: NOT DETECTED

## 2024-09-20 LAB
GLUCOSE BLDC GLUCOMTR-MCNC: 109 MG/DL (ref 70–99)
GLUCOSE BLDC GLUCOMTR-MCNC: 111 MG/DL (ref 70–99)
GLUCOSE BLDC GLUCOMTR-MCNC: 178 MG/DL (ref 70–99)
GLUCOSE BLDC GLUCOMTR-MCNC: 258 MG/DL (ref 70–99)

## 2024-09-21 LAB
GLUCOSE BLDC GLUCOMTR-MCNC: 137 MG/DL (ref 70–99)
GLUCOSE BLDC GLUCOMTR-MCNC: 157 MG/DL (ref 70–99)
GLUCOSE BLDC GLUCOMTR-MCNC: 178 MG/DL (ref 70–99)
GLUCOSE BLDC GLUCOMTR-MCNC: 186 MG/DL (ref 70–99)

## 2024-09-22 LAB
GLUCOSE BLDC GLUCOMTR-MCNC: 107 MG/DL (ref 70–99)
GLUCOSE BLDC GLUCOMTR-MCNC: 121 MG/DL (ref 70–99)
GLUCOSE BLDC GLUCOMTR-MCNC: 150 MG/DL (ref 70–99)
GLUCOSE BLDC GLUCOMTR-MCNC: 185 MG/DL (ref 70–99)

## 2024-09-23 LAB
GLUCOSE BLDC GLUCOMTR-MCNC: 111 MG/DL (ref 70–99)
GLUCOSE BLDC GLUCOMTR-MCNC: 198 MG/DL (ref 70–99)
GLUCOSE BLDC GLUCOMTR-MCNC: 201 MG/DL (ref 70–99)
GLUCOSE BLDC GLUCOMTR-MCNC: 208 MG/DL (ref 70–99)
SARS-COV-2 RNA RESP QL NAA+PROBE: NOT DETECTED

## 2024-09-24 LAB
ALBUMIN SERPL-MCNC: 2.5 G/DL (ref 3.5–5.2)
ANION GAP SERPL CALCULATED.3IONS-SCNC: 7.4 MMOL/L (ref 5–15)
BUN SERPL-MCNC: 15 MG/DL (ref 8–23)
BUN/CREAT SERPL: 31.3 (ref 7–25)
CALCIUM SPEC-SCNC: 7.8 MG/DL (ref 8.6–10.5)
CHLORIDE SERPL-SCNC: 106 MMOL/L (ref 98–107)
CO2 SERPL-SCNC: 23.6 MMOL/L (ref 22–29)
CREAT SERPL-MCNC: 0.48 MG/DL (ref 0.76–1.27)
EGFRCR SERPLBLD CKD-EPI 2021: 114.6 ML/MIN/1.73
GLUCOSE BLDC GLUCOMTR-MCNC: 122 MG/DL (ref 70–99)
GLUCOSE BLDC GLUCOMTR-MCNC: 122 MG/DL (ref 70–99)
GLUCOSE BLDC GLUCOMTR-MCNC: 138 MG/DL (ref 70–99)
GLUCOSE BLDC GLUCOMTR-MCNC: 163 MG/DL (ref 70–99)
GLUCOSE SERPL-MCNC: 148 MG/DL (ref 65–99)
PHOSPHATE SERPL-MCNC: 3.8 MG/DL (ref 2.5–4.5)
POTASSIUM SERPL-SCNC: 4.6 MMOL/L (ref 3.5–5.2)
SODIUM SERPL-SCNC: 137 MMOL/L (ref 136–145)

## 2024-09-25 LAB
ALBUMIN SERPL-MCNC: 2.7 G/DL (ref 3.5–5.2)
ANION GAP SERPL CALCULATED.3IONS-SCNC: 7.7 MMOL/L (ref 5–15)
BUN SERPL-MCNC: 21 MG/DL (ref 8–23)
BUN/CREAT SERPL: 32.8 (ref 7–25)
CALCIUM SPEC-SCNC: 8.2 MG/DL (ref 8.6–10.5)
CHLORIDE SERPL-SCNC: 98 MMOL/L (ref 98–107)
CO2 SERPL-SCNC: 29.3 MMOL/L (ref 22–29)
CREAT SERPL-MCNC: 0.64 MG/DL (ref 0.76–1.27)
EGFRCR SERPLBLD CKD-EPI 2021: 105.1 ML/MIN/1.73
GLUCOSE BLDC GLUCOMTR-MCNC: 119 MG/DL (ref 70–99)
GLUCOSE BLDC GLUCOMTR-MCNC: 169 MG/DL (ref 70–99)
GLUCOSE BLDC GLUCOMTR-MCNC: 181 MG/DL (ref 70–99)
GLUCOSE BLDC GLUCOMTR-MCNC: 299 MG/DL (ref 70–99)
GLUCOSE SERPL-MCNC: 135 MG/DL (ref 65–99)
MAGNESIUM SERPL-MCNC: 2 MG/DL (ref 1.6–2.4)
NT-PROBNP SERPL-MCNC: 66.8 PG/ML (ref 0–900)
PHOSPHATE SERPL-MCNC: 4.4 MG/DL (ref 2.5–4.5)
POTASSIUM SERPL-SCNC: 4.1 MMOL/L (ref 3.5–5.2)
SODIUM SERPL-SCNC: 135 MMOL/L (ref 136–145)
WHOLE BLOOD HOLD SPECIMEN: NORMAL

## 2024-09-26 LAB
ALBUMIN SERPL-MCNC: 3 G/DL (ref 3.5–5.2)
ANION GAP SERPL CALCULATED.3IONS-SCNC: 5.3 MMOL/L (ref 5–15)
BUN SERPL-MCNC: 24 MG/DL (ref 8–23)
BUN/CREAT SERPL: 30.4 (ref 7–25)
CALCIUM SPEC-SCNC: 8.6 MG/DL (ref 8.6–10.5)
CHLORIDE SERPL-SCNC: 95 MMOL/L (ref 98–107)
CO2 SERPL-SCNC: 33.7 MMOL/L (ref 22–29)
CREAT SERPL-MCNC: 0.79 MG/DL (ref 0.76–1.27)
EGFRCR SERPLBLD CKD-EPI 2021: 98.6 ML/MIN/1.73
GLUCOSE BLDC GLUCOMTR-MCNC: 160 MG/DL (ref 70–99)
GLUCOSE BLDC GLUCOMTR-MCNC: 164 MG/DL (ref 70–99)
GLUCOSE BLDC GLUCOMTR-MCNC: 167 MG/DL (ref 70–99)
GLUCOSE BLDC GLUCOMTR-MCNC: 247 MG/DL (ref 70–99)
GLUCOSE SERPL-MCNC: 170 MG/DL (ref 65–99)
PHOSPHATE SERPL-MCNC: 4.7 MG/DL (ref 2.5–4.5)
POTASSIUM SERPL-SCNC: 3.6 MMOL/L (ref 3.5–5.2)
SARS-COV-2 RNA RESP QL NAA+PROBE: NOT DETECTED
SODIUM SERPL-SCNC: 134 MMOL/L (ref 136–145)
WHOLE BLOOD HOLD SPECIMEN: NORMAL

## 2024-09-27 LAB
ALBUMIN SERPL-MCNC: 3 G/DL (ref 3.5–5.2)
ANION GAP SERPL CALCULATED.3IONS-SCNC: 7.1 MMOL/L (ref 5–15)
BUN SERPL-MCNC: 32 MG/DL (ref 8–23)
BUN/CREAT SERPL: 43.8 (ref 7–25)
CALCIUM SPEC-SCNC: 8.7 MG/DL (ref 8.6–10.5)
CHLORIDE SERPL-SCNC: 95 MMOL/L (ref 98–107)
CO2 SERPL-SCNC: 27.9 MMOL/L (ref 22–29)
CREAT SERPL-MCNC: 0.73 MG/DL (ref 0.76–1.27)
EGFRCR SERPLBLD CKD-EPI 2021: 101 ML/MIN/1.73
GLUCOSE BLDC GLUCOMTR-MCNC: 147 MG/DL (ref 70–99)
GLUCOSE BLDC GLUCOMTR-MCNC: 189 MG/DL (ref 70–99)
GLUCOSE BLDC GLUCOMTR-MCNC: 244 MG/DL (ref 70–99)
GLUCOSE BLDC GLUCOMTR-MCNC: 283 MG/DL (ref 70–99)
GLUCOSE SERPL-MCNC: 153 MG/DL (ref 65–99)
PHOSPHATE SERPL-MCNC: 5 MG/DL (ref 2.5–4.5)
POTASSIUM SERPL-SCNC: 3.8 MMOL/L (ref 3.5–5.2)
SODIUM SERPL-SCNC: 130 MMOL/L (ref 136–145)

## 2024-09-28 LAB
ALBUMIN SERPL-MCNC: 3.1 G/DL (ref 3.5–5.2)
ANION GAP SERPL CALCULATED.3IONS-SCNC: 9.5 MMOL/L (ref 5–15)
BUN SERPL-MCNC: 31 MG/DL (ref 8–23)
BUN/CREAT SERPL: 48.4 (ref 7–25)
CALCIUM SPEC-SCNC: 8.8 MG/DL (ref 8.6–10.5)
CHLORIDE SERPL-SCNC: 97 MMOL/L (ref 98–107)
CO2 SERPL-SCNC: 26.5 MMOL/L (ref 22–29)
CREAT SERPL-MCNC: 0.64 MG/DL (ref 0.76–1.27)
EGFRCR SERPLBLD CKD-EPI 2021: 105.1 ML/MIN/1.73
GLUCOSE BLDC GLUCOMTR-MCNC: 184 MG/DL (ref 70–99)
GLUCOSE BLDC GLUCOMTR-MCNC: 187 MG/DL (ref 70–99)
GLUCOSE BLDC GLUCOMTR-MCNC: 194 MG/DL (ref 70–99)
GLUCOSE BLDC GLUCOMTR-MCNC: 343 MG/DL (ref 70–99)
GLUCOSE SERPL-MCNC: 126 MG/DL (ref 65–99)
MAGNESIUM SERPL-MCNC: 2.2 MG/DL (ref 1.6–2.4)
PHOSPHATE SERPL-MCNC: 3.6 MG/DL (ref 2.5–4.5)
POTASSIUM SERPL-SCNC: 3.7 MMOL/L (ref 3.5–5.2)
SODIUM SERPL-SCNC: 133 MMOL/L (ref 136–145)
WHOLE BLOOD HOLD SPECIMEN: NORMAL

## 2024-09-29 LAB
ALBUMIN SERPL-MCNC: 2.9 G/DL (ref 3.5–5.2)
ANION GAP SERPL CALCULATED.3IONS-SCNC: 9.1 MMOL/L (ref 5–15)
BUN SERPL-MCNC: 26 MG/DL (ref 8–23)
BUN/CREAT SERPL: 41.9 (ref 7–25)
CALCIUM SPEC-SCNC: 8.6 MG/DL (ref 8.6–10.5)
CHLORIDE SERPL-SCNC: 99 MMOL/L (ref 98–107)
CO2 SERPL-SCNC: 24.9 MMOL/L (ref 22–29)
CREAT SERPL-MCNC: 0.62 MG/DL (ref 0.76–1.27)
EGFRCR SERPLBLD CKD-EPI 2021: 106.1 ML/MIN/1.73
GLUCOSE BLDC GLUCOMTR-MCNC: 161 MG/DL (ref 70–99)
GLUCOSE BLDC GLUCOMTR-MCNC: 173 MG/DL (ref 70–99)
GLUCOSE BLDC GLUCOMTR-MCNC: 212 MG/DL (ref 70–99)
GLUCOSE BLDC GLUCOMTR-MCNC: 255 MG/DL (ref 70–99)
GLUCOSE SERPL-MCNC: 265 MG/DL (ref 65–99)
PHOSPHATE SERPL-MCNC: 3.9 MG/DL (ref 2.5–4.5)
POTASSIUM SERPL-SCNC: 3.4 MMOL/L (ref 3.5–5.2)
SODIUM SERPL-SCNC: 133 MMOL/L (ref 136–145)
WHOLE BLOOD HOLD SPECIMEN: NORMAL

## 2024-09-30 LAB
ALBUMIN SERPL-MCNC: 3 G/DL (ref 3.5–5.2)
ANION GAP SERPL CALCULATED.3IONS-SCNC: 8.4 MMOL/L (ref 5–15)
BUN SERPL-MCNC: 32 MG/DL (ref 8–23)
BUN/CREAT SERPL: 64 (ref 7–25)
CALCIUM SPEC-SCNC: 8.3 MG/DL (ref 8.6–10.5)
CHLORIDE SERPL-SCNC: 103 MMOL/L (ref 98–107)
CO2 SERPL-SCNC: 24.6 MMOL/L (ref 22–29)
CREAT SERPL-MCNC: 0.5 MG/DL (ref 0.76–1.27)
EGFRCR SERPLBLD CKD-EPI 2021: 113.2 ML/MIN/1.73
GLUCOSE BLDC GLUCOMTR-MCNC: 138 MG/DL (ref 70–99)
GLUCOSE BLDC GLUCOMTR-MCNC: 203 MG/DL (ref 70–99)
GLUCOSE BLDC GLUCOMTR-MCNC: 279 MG/DL (ref 70–99)
GLUCOSE BLDC GLUCOMTR-MCNC: 315 MG/DL (ref 70–99)
GLUCOSE SERPL-MCNC: 125 MG/DL (ref 65–99)
PHOSPHATE SERPL-MCNC: 3.3 MG/DL (ref 2.5–4.5)
POTASSIUM SERPL-SCNC: 4 MMOL/L (ref 3.5–5.2)
SARS-COV-2 RNA RESP QL NAA+PROBE: NOT DETECTED
SODIUM SERPL-SCNC: 136 MMOL/L (ref 136–145)
WHOLE BLOOD HOLD SPECIMEN: NORMAL

## 2024-10-01 ENCOUNTER — HOSPITAL ENCOUNTER (INPATIENT)
Facility: HOSPITAL | Age: 64
LOS: 13 days | End: 2024-10-14
Attending: INTERNAL MEDICINE | Admitting: INTERNAL MEDICINE
Payer: MEDICARE

## 2024-10-01 DIAGNOSIS — R26.2 DIFFICULTY IN WALKING: Primary | ICD-10-CM

## 2024-10-01 PROBLEM — E66.01 MORBID OBESITY: Status: ACTIVE | Noted: 2024-10-01

## 2024-10-01 PROBLEM — L97.521: Status: RESOLVED | Noted: 2024-01-26 | Resolved: 2024-10-01

## 2024-10-01 LAB
ALBUMIN SERPL-MCNC: 2.8 G/DL (ref 3.5–5.2)
ANION GAP SERPL CALCULATED.3IONS-SCNC: 7 MMOL/L (ref 5–15)
BUN SERPL-MCNC: 29 MG/DL (ref 8–23)
BUN/CREAT SERPL: 41.4 (ref 7–25)
CALCIUM SPEC-SCNC: 8.2 MG/DL (ref 8.6–10.5)
CHLORIDE SERPL-SCNC: 101 MMOL/L (ref 98–107)
CO2 SERPL-SCNC: 27 MMOL/L (ref 22–29)
CREAT SERPL-MCNC: 0.7 MG/DL (ref 0.76–1.27)
EGFRCR SERPLBLD CKD-EPI 2021: 102.3 ML/MIN/1.73
GLUCOSE BLDC GLUCOMTR-MCNC: 146 MG/DL (ref 70–99)
GLUCOSE BLDC GLUCOMTR-MCNC: 161 MG/DL (ref 70–99)
GLUCOSE BLDC GLUCOMTR-MCNC: 168 MG/DL (ref 70–99)
GLUCOSE BLDC GLUCOMTR-MCNC: 431 MG/DL (ref 70–99)
GLUCOSE SERPL-MCNC: 114 MG/DL (ref 65–99)
PHOSPHATE SERPL-MCNC: 3.5 MG/DL (ref 2.5–4.5)
POTASSIUM SERPL-SCNC: 3.6 MMOL/L (ref 3.5–5.2)
SODIUM SERPL-SCNC: 135 MMOL/L (ref 136–145)
WHOLE BLOOD HOLD SPECIMEN: NORMAL

## 2024-10-01 PROCEDURE — 63710000001 INSULIN GLARGINE PER 5 UNITS: Performed by: INTERNAL MEDICINE

## 2024-10-01 PROCEDURE — 82948 REAGENT STRIP/BLOOD GLUCOSE: CPT

## 2024-10-01 PROCEDURE — 63710000001 INSULIN LISPRO (HUMAN) PER 5 UNITS: Performed by: INTERNAL MEDICINE

## 2024-10-01 RX ORDER — HYDROCORTISONE 25 MG/G
CREAM TOPICAL 4 TIMES DAILY PRN
Status: DISCONTINUED | OUTPATIENT
Start: 2024-10-01 | End: 2024-10-14 | Stop reason: HOSPADM

## 2024-10-01 RX ORDER — BENZONATATE 100 MG/1
100 CAPSULE ORAL 3 TIMES DAILY PRN
Status: DISCONTINUED | OUTPATIENT
Start: 2024-10-01 | End: 2024-10-14 | Stop reason: HOSPADM

## 2024-10-01 RX ORDER — DEXTROSE MONOHYDRATE 25 G/50ML
25 INJECTION, SOLUTION INTRAVENOUS
Status: DISCONTINUED | OUTPATIENT
Start: 2024-10-01 | End: 2024-10-14 | Stop reason: HOSPADM

## 2024-10-01 RX ORDER — ONDANSETRON 4 MG/1
4 TABLET, ORALLY DISINTEGRATING ORAL EVERY 6 HOURS PRN
Status: DISCONTINUED | OUTPATIENT
Start: 2024-10-01 | End: 2024-10-14 | Stop reason: HOSPADM

## 2024-10-01 RX ORDER — UREA 10 %
1000 LOTION (ML) TOPICAL DAILY
Status: DISCONTINUED | OUTPATIENT
Start: 2024-10-02 | End: 2024-10-14 | Stop reason: HOSPADM

## 2024-10-01 RX ORDER — SPIRONOLACTONE 25 MG/1
25 TABLET ORAL
Status: DISCONTINUED | OUTPATIENT
Start: 2024-10-01 | End: 2024-10-14 | Stop reason: HOSPADM

## 2024-10-01 RX ORDER — BISMUTH SUBSALICYLATE 262 MG/1
524 TABLET, CHEWABLE ORAL 3 TIMES DAILY PRN
Status: DISCONTINUED | OUTPATIENT
Start: 2024-10-01 | End: 2024-10-14 | Stop reason: HOSPADM

## 2024-10-01 RX ORDER — CETIRIZINE HYDROCHLORIDE 10 MG/1
10 TABLET ORAL DAILY
Status: DISCONTINUED | OUTPATIENT
Start: 2024-10-02 | End: 2024-10-14 | Stop reason: HOSPADM

## 2024-10-01 RX ORDER — PREGABALIN 100 MG/1
100 CAPSULE ORAL 3 TIMES DAILY
Status: DISCONTINUED | OUTPATIENT
Start: 2024-10-01 | End: 2024-10-02

## 2024-10-01 RX ORDER — BENZOCAINE/MENTHOL 6 MG-10 MG
1 LOZENGE MUCOUS MEMBRANE
Status: DISCONTINUED | OUTPATIENT
Start: 2024-10-02 | End: 2024-10-14 | Stop reason: HOSPADM

## 2024-10-01 RX ORDER — INSULIN LISPRO 100 [IU]/ML
3-14 INJECTION, SOLUTION INTRAVENOUS; SUBCUTANEOUS
Status: DISCONTINUED | OUTPATIENT
Start: 2024-10-01 | End: 2024-10-14 | Stop reason: HOSPADM

## 2024-10-01 RX ORDER — IBUPROFEN 600 MG/1
1 TABLET ORAL
Status: DISCONTINUED | OUTPATIENT
Start: 2024-10-01 | End: 2024-10-14 | Stop reason: HOSPADM

## 2024-10-01 RX ORDER — SIMETHICONE 80 MG
80 TABLET,CHEWABLE ORAL 4 TIMES DAILY PRN
Status: DISCONTINUED | OUTPATIENT
Start: 2024-10-01 | End: 2024-10-14 | Stop reason: HOSPADM

## 2024-10-01 RX ORDER — SACCHAROMYCES BOULARDII 250 MG
500 CAPSULE ORAL 2 TIMES DAILY
Status: DISCONTINUED | OUTPATIENT
Start: 2024-10-01 | End: 2024-10-14 | Stop reason: HOSPADM

## 2024-10-01 RX ORDER — ATORVASTATIN CALCIUM 10 MG/1
10 TABLET, FILM COATED ORAL NIGHTLY
Status: DISCONTINUED | OUTPATIENT
Start: 2024-10-01 | End: 2024-10-14 | Stop reason: HOSPADM

## 2024-10-01 RX ORDER — BACLOFEN 10 MG/1
10 TABLET ORAL 3 TIMES DAILY PRN
Status: DISCONTINUED | OUTPATIENT
Start: 2024-10-01 | End: 2024-10-14 | Stop reason: HOSPADM

## 2024-10-01 RX ORDER — CHOLESTYRAMINE LIGHT 4 G/5.7G
1 POWDER, FOR SUSPENSION ORAL DAILY
Status: DISCONTINUED | OUTPATIENT
Start: 2024-10-02 | End: 2024-10-14 | Stop reason: HOSPADM

## 2024-10-01 RX ORDER — SODIUM CHLORIDE 9 MG/ML
40 INJECTION, SOLUTION INTRAVENOUS AS NEEDED
Status: DISCONTINUED | OUTPATIENT
Start: 2024-10-01 | End: 2024-10-14 | Stop reason: HOSPADM

## 2024-10-01 RX ORDER — ONDANSETRON 2 MG/ML
4 INJECTION INTRAMUSCULAR; INTRAVENOUS EVERY 6 HOURS PRN
Status: DISCONTINUED | OUTPATIENT
Start: 2024-10-01 | End: 2024-10-14 | Stop reason: HOSPADM

## 2024-10-01 RX ORDER — SODIUM CHLORIDE 0.9 % (FLUSH) 0.9 %
10 SYRINGE (ML) INJECTION AS NEEDED
Status: DISCONTINUED | OUTPATIENT
Start: 2024-10-01 | End: 2024-10-14 | Stop reason: HOSPADM

## 2024-10-01 RX ORDER — FERROUS SULFATE 325(65) MG
325 TABLET ORAL
Status: DISCONTINUED | OUTPATIENT
Start: 2024-10-02 | End: 2024-10-14 | Stop reason: HOSPADM

## 2024-10-01 RX ORDER — IBUPROFEN 400 MG/1
400 TABLET, FILM COATED ORAL 2 TIMES DAILY PRN
Status: DISCONTINUED | OUTPATIENT
Start: 2024-10-01 | End: 2024-10-14 | Stop reason: HOSPADM

## 2024-10-01 RX ORDER — LISINOPRIL 2.5 MG/1
2.5 TABLET ORAL
Status: DISCONTINUED | OUTPATIENT
Start: 2024-10-02 | End: 2024-10-14 | Stop reason: HOSPADM

## 2024-10-01 RX ORDER — MONTELUKAST SODIUM 10 MG/1
10 TABLET ORAL NIGHTLY
Status: DISCONTINUED | OUTPATIENT
Start: 2024-10-01 | End: 2024-10-14 | Stop reason: HOSPADM

## 2024-10-01 RX ORDER — ACETAMINOPHEN 325 MG/1
650 TABLET ORAL EVERY 4 HOURS PRN
Status: DISCONTINUED | OUTPATIENT
Start: 2024-10-01 | End: 2024-10-14 | Stop reason: HOSPADM

## 2024-10-01 RX ORDER — BUMETANIDE 1 MG/1
2 TABLET ORAL DAILY
Status: DISCONTINUED | OUTPATIENT
Start: 2024-10-02 | End: 2024-10-14 | Stop reason: HOSPADM

## 2024-10-01 RX ORDER — NICOTINE POLACRILEX 4 MG
15 LOZENGE BUCCAL
Status: DISCONTINUED | OUTPATIENT
Start: 2024-10-01 | End: 2024-10-14 | Stop reason: HOSPADM

## 2024-10-01 RX ORDER — OXYCODONE AND ACETAMINOPHEN 7.5; 325 MG/1; MG/1
1 TABLET ORAL EVERY 6 HOURS PRN
Status: DISPENSED | OUTPATIENT
Start: 2024-10-01 | End: 2024-10-13

## 2024-10-01 RX ADMIN — INSULIN GLARGINE 22 UNITS: 100 INJECTION, SOLUTION SUBCUTANEOUS at 20:40

## 2024-10-01 RX ADMIN — Medication 10 MG: at 20:37

## 2024-10-01 RX ADMIN — BACLOFEN 10 MG: 10 TABLET ORAL at 21:53

## 2024-10-01 RX ADMIN — SPIRONOLACTONE 25 MG: 25 TABLET ORAL at 17:59

## 2024-10-01 RX ADMIN — TUBERCULIN PURIFIED PROTEIN DERIVATIVE 5 UNITS: 5 INJECTION, SOLUTION INTRADERMAL at 20:40

## 2024-10-01 RX ADMIN — DICLOFENAC SODIUM 4 G: 10 GEL TOPICAL at 20:38

## 2024-10-01 RX ADMIN — MONTELUKAST 10 MG: 10 TABLET, FILM COATED ORAL at 20:37

## 2024-10-01 RX ADMIN — INSULIN LISPRO 3 UNITS: 100 INJECTION, SOLUTION INTRAVENOUS; SUBCUTANEOUS at 17:59

## 2024-10-01 RX ADMIN — APIXABAN 5 MG: 5 TABLET, FILM COATED ORAL at 20:37

## 2024-10-01 RX ADMIN — ATORVASTATIN CALCIUM 10 MG: 10 TABLET, FILM COATED ORAL at 20:37

## 2024-10-01 RX ADMIN — SERTRALINE HYDROCHLORIDE 50 MG: 50 TABLET ORAL at 20:37

## 2024-10-01 RX ADMIN — OXYCODONE AND ACETAMINOPHEN 1 TABLET: 7.5; 325 TABLET ORAL at 21:53

## 2024-10-01 RX ADMIN — INSULIN LISPRO 14 UNITS: 100 INJECTION, SOLUTION INTRAVENOUS; SUBCUTANEOUS at 20:40

## 2024-10-01 RX ADMIN — Medication 500 MG: at 20:36

## 2024-10-01 NOTE — PLAN OF CARE
"Goal Outcome Evaluation:  Plan of Care Reviewed With: patient        Progress: no change  Outcome Evaluation: Resident alert, oriented, able to make needs known to staff. remains bedbound. discussed weight with resident Resident agreed to get weighed tomorrow. Resident noncompliant with fluid restrictions. door dashed additional fluids (1064cc) today, pickles candy. requested ice but explained to resident that with additional fluids he ordered I was not able to give him more fluids. Resident stated \"that's the dummest shit I ever heard. Blood glucose elevated for lunch and dinner, covered with sliding scale. resident also doordashed after dinner, ordered [employer]. IV Bumex stopped and will get PO Bumex now. updated resident on new orders and new fluid restrictions. will cont. POC                               "

## 2024-10-02 LAB
ALBUMIN SERPL-MCNC: 2.8 G/DL (ref 3.5–5.2)
ANION GAP SERPL CALCULATED.3IONS-SCNC: 5.6 MMOL/L (ref 5–15)
BUN SERPL-MCNC: 25 MG/DL (ref 8–23)
BUN/CREAT SERPL: 40.3 (ref 7–25)
CALCIUM SPEC-SCNC: 8.2 MG/DL (ref 8.6–10.5)
CHLORIDE SERPL-SCNC: 105 MMOL/L (ref 98–107)
CO2 SERPL-SCNC: 26.4 MMOL/L (ref 22–29)
CREAT SERPL-MCNC: 0.62 MG/DL (ref 0.76–1.27)
EGFRCR SERPLBLD CKD-EPI 2021: 106.1 ML/MIN/1.73
GLUCOSE BLDC GLUCOMTR-MCNC: 131 MG/DL (ref 70–99)
GLUCOSE BLDC GLUCOMTR-MCNC: 154 MG/DL (ref 70–99)
GLUCOSE BLDC GLUCOMTR-MCNC: 166 MG/DL (ref 70–99)
GLUCOSE BLDC GLUCOMTR-MCNC: 183 MG/DL (ref 70–99)
GLUCOSE SERPL-MCNC: 136 MG/DL (ref 65–99)
PHOSPHATE SERPL-MCNC: 3.1 MG/DL (ref 2.5–4.5)
POTASSIUM SERPL-SCNC: 4 MMOL/L (ref 3.5–5.2)
SODIUM SERPL-SCNC: 137 MMOL/L (ref 136–145)
WHOLE BLOOD HOLD SPECIMEN: NORMAL

## 2024-10-02 PROCEDURE — 82948 REAGENT STRIP/BLOOD GLUCOSE: CPT

## 2024-10-02 PROCEDURE — 90662 IIV NO PRSV INCREASED AG IM: CPT | Performed by: INTERNAL MEDICINE

## 2024-10-02 PROCEDURE — 63710000001 INSULIN LISPRO (HUMAN) PER 5 UNITS: Performed by: INTERNAL MEDICINE

## 2024-10-02 PROCEDURE — 80069 RENAL FUNCTION PANEL: CPT | Performed by: INTERNAL MEDICINE

## 2024-10-02 PROCEDURE — 25010000002 INFLUENZA VAC SPLIT HIGH-DOSE 0.5 ML SUSPENSION PREFILLED SYRINGE: Performed by: INTERNAL MEDICINE

## 2024-10-02 PROCEDURE — G0008 ADMIN INFLUENZA VIRUS VAC: HCPCS | Performed by: INTERNAL MEDICINE

## 2024-10-02 PROCEDURE — 99306 1ST NF CARE HIGH MDM 50: CPT | Performed by: INTERNAL MEDICINE

## 2024-10-02 PROCEDURE — 63710000001 INSULIN GLARGINE PER 5 UNITS: Performed by: INTERNAL MEDICINE

## 2024-10-02 RX ORDER — ATORVASTATIN CALCIUM 10 MG/1
10 TABLET, FILM COATED ORAL DAILY
Status: DISCONTINUED | OUTPATIENT
Start: 2024-10-02 | End: 2024-10-02

## 2024-10-02 RX ORDER — DIGOXIN 125 MCG
125 TABLET ORAL
Status: DISCONTINUED | OUTPATIENT
Start: 2024-10-02 | End: 2024-10-14 | Stop reason: HOSPADM

## 2024-10-02 RX ORDER — LISINOPRIL 2.5 MG/1
2.5 TABLET ORAL
Status: DISCONTINUED | OUTPATIENT
Start: 2024-10-02 | End: 2024-10-14 | Stop reason: HOSPADM

## 2024-10-02 RX ORDER — PREGABALIN 25 MG/1
25 CAPSULE ORAL 3 TIMES DAILY
Status: DISCONTINUED | OUTPATIENT
Start: 2024-10-02 | End: 2024-10-14 | Stop reason: HOSPADM

## 2024-10-02 RX ADMIN — INSULIN GLARGINE 22 UNITS: 100 INJECTION, SOLUTION SUBCUTANEOUS at 09:25

## 2024-10-02 RX ADMIN — SPIRONOLACTONE 25 MG: 25 TABLET ORAL at 09:25

## 2024-10-02 RX ADMIN — APIXABAN 5 MG: 5 TABLET, FILM COATED ORAL at 21:29

## 2024-10-02 RX ADMIN — BACLOFEN 10 MG: 10 TABLET ORAL at 15:38

## 2024-10-02 RX ADMIN — PREGABALIN 25 MG: 25 CAPSULE ORAL at 09:27

## 2024-10-02 RX ADMIN — DICLOFENAC SODIUM 4 G: 10 GEL TOPICAL at 15:39

## 2024-10-02 RX ADMIN — CYANOCOBALAMIN TAB 500 MCG 1000 MCG: 500 TAB at 09:33

## 2024-10-02 RX ADMIN — PREGABALIN 25 MG: 25 CAPSULE ORAL at 21:29

## 2024-10-02 RX ADMIN — CHOLESTYRAMINE 4 G: 4 POWDER, FOR SUSPENSION ORAL at 09:25

## 2024-10-02 RX ADMIN — EMPAGLIFLOZIN 10 MG: 10 TABLET, FILM COATED ORAL at 09:25

## 2024-10-02 RX ADMIN — PREGABALIN 25 MG: 25 CAPSULE ORAL at 17:43

## 2024-10-02 RX ADMIN — HYDROCORTISONE 2.5%: 25 CREAM TOPICAL at 09:28

## 2024-10-02 RX ADMIN — DICLOFENAC SODIUM 4 G: 10 GEL TOPICAL at 02:02

## 2024-10-02 RX ADMIN — OXYCODONE AND ACETAMINOPHEN 1 TABLET: 7.5; 325 TABLET ORAL at 15:38

## 2024-10-02 RX ADMIN — BACLOFEN 10 MG: 10 TABLET ORAL at 05:58

## 2024-10-02 RX ADMIN — INSULIN LISPRO 3 UNITS: 100 INJECTION, SOLUTION INTRAVENOUS; SUBCUTANEOUS at 08:16

## 2024-10-02 RX ADMIN — INSULIN GLARGINE 22 UNITS: 100 INJECTION, SOLUTION SUBCUTANEOUS at 21:30

## 2024-10-02 RX ADMIN — SERTRALINE HYDROCHLORIDE 50 MG: 50 TABLET ORAL at 21:29

## 2024-10-02 RX ADMIN — INSULIN LISPRO 3 UNITS: 100 INJECTION, SOLUTION INTRAVENOUS; SUBCUTANEOUS at 12:21

## 2024-10-02 RX ADMIN — FERROUS SULFATE TAB 325 MG (65 MG ELEMENTAL FE) 325 MG: 325 (65 FE) TAB at 09:32

## 2024-10-02 RX ADMIN — APIXABAN 5 MG: 5 TABLET, FILM COATED ORAL at 09:27

## 2024-10-02 RX ADMIN — DICLOFENAC SODIUM 4 G: 10 GEL TOPICAL at 21:31

## 2024-10-02 RX ADMIN — ATORVASTATIN CALCIUM 10 MG: 10 TABLET, FILM COATED ORAL at 21:28

## 2024-10-02 RX ADMIN — INFLUENZA A VIRUS A/VICTORIA/4897/2022 IVR-238 (H1N1) ANTIGEN (FORMALDEHYDE INACTIVATED), INFLUENZA A VIRUS A/CALIFORNIA/122/2022 SAN-022 (H3N2) ANTIGEN (FORMALDEHYDE INACTIVATED), AND INFLUENZA B VIRUS B/MICHIGAN/01/2021 ANTIGEN (FORMALDEHYDE INACTIVATED) 0.5 ML: 60; 60; 60 INJECTION, SUSPENSION INTRAMUSCULAR at 09:29

## 2024-10-02 RX ADMIN — MONTELUKAST 10 MG: 10 TABLET, FILM COATED ORAL at 21:28

## 2024-10-02 RX ADMIN — LISINOPRIL 2.5 MG: 2.5 TABLET ORAL at 09:25

## 2024-10-02 RX ADMIN — Medication 500 MG: at 09:23

## 2024-10-02 RX ADMIN — Medication 500 MG: at 21:29

## 2024-10-02 RX ADMIN — DICLOFENAC SODIUM 4 G: 10 GEL TOPICAL at 08:16

## 2024-10-02 RX ADMIN — OXYCODONE AND ACETAMINOPHEN 1 TABLET: 7.5; 325 TABLET ORAL at 05:57

## 2024-10-02 RX ADMIN — Medication 10 MG: at 21:29

## 2024-10-02 RX ADMIN — BISMUTH SUBSALICYLATE 524 MG: 262 TABLET, CHEWABLE ORAL at 15:38

## 2024-10-02 RX ADMIN — BUMETANIDE 2 MG: 1 TABLET ORAL at 09:25

## 2024-10-02 RX ADMIN — SPIRONOLACTONE 25 MG: 25 TABLET ORAL at 17:43

## 2024-10-02 RX ADMIN — INSULIN LISPRO 3 UNITS: 100 INJECTION, SOLUTION INTRAVENOUS; SUBCUTANEOUS at 17:43

## 2024-10-02 RX ADMIN — DIGOXIN 125 MCG: 125 TABLET ORAL at 11:45

## 2024-10-02 RX ADMIN — CETIRIZINE HYDROCHLORIDE 10 MG: 10 TABLET, FILM COATED ORAL at 09:25

## 2024-10-02 RX ADMIN — OXYCODONE AND ACETAMINOPHEN 1 TABLET: 7.5; 325 TABLET ORAL at 23:10

## 2024-10-02 RX ADMIN — BACLOFEN 10 MG: 10 TABLET ORAL at 23:10

## 2024-10-02 NOTE — H&P
[facility] HISTORY AND PHYSICAL  Date: 10/2/2024   Patient Name: Jose Shaikh  : 1960  MRN: [id number]  Primary Care Physician:  Delia Cervantes, VALENTÍN  Date of admission: 10/1/2024    Subjective   Subjective     Chief Complaint: Weakness and generalized swelling anasarca.    HPI:    Jose Shaikh is a 63 y.o. male  initially hospitalized on 9/10/2023, prolonged hospitalization for treatment of management of generalized weakness, deconditioning, with acute issues of diarrhea, C. difficile negative.  Diarrhea improved.  Had small hematoma after ambulating on his foot.  Unfortunately with exhaustive efforts to try to place this gentleman after 39 days of hospitalization, we are unable to find a facility that will accept him.  He has no further acute needs or requirements for inpatient monitoring and management, his labs and vitals are stable, he is tolerating oral intake, and has been refusing turns and repositionings and other interventions with nursing staff despite recommendations.  Patient discharged in hemodynamically stable condition on 10/19/2023 to follow-up with PCP within 1 week. Unfortunately we cannot solve all of his social issues during this hospitalization due to lack of resources and participation from the patient's perspective despite all our efforts.  Patient has a financial means of making arrangements at home.  Patient appealed his discharge.  Lost his appeal.  LCD: 10/20/23, PFL beginning: 10/21/23 @ 12pm.  Due to an inability to place the patient in a.m. nursing home he has been placed in our skilled nursing facility until arrangements can be made or until he is able to care for himself.  Patient open to the idea of bariatric surgery for weight loss.   Patient was discharged and readmitted to SNF because of administrative reason due to EMR requirement as patient has been in SNF for about a year and needed to be re admitted.  Patient current weight on  is 330  pounds.  His most recent past weight  was 348 pounds on August 5.  Patient has been noncompliant with fluid restrictions and has been door Dashing soft drinks regularly.    Personal History     Past Medical History:  Past Medical History:   Diagnosis Date   • Absence of toe of right foot    • Acute osteomyelitis of left calcaneus  08/18/2021   • Anxiety and depression    • Arthritis    • Cancer    • Chronic pain     STATES HIS PAIN IS 10/10 AAT   • Claustrophobia    • Corns and callus    • Diabetic ulcer of left heel associated with type 2 DM 08/18/2021   • Diabetic ulcer of left heel associated with type 2 DM 07/06/2021   • Diabetic ulcer of right midfoot associated with type 2 DM 08/18/2021   • Difficulty walking    • Essential hypertension 08/31/2021   • Hammertoe    • Hyperlipidemia LDL goal <100 08/31/2021   • Ingrown toenail    • Obesity    • Paroxysmal atrial fibrillation 08/31/2021   • Polyneuropathy    • Pressure ulcer, stage 1    • Tinea unguium    • Type 2 diabetes mellitus with polyneuropathy        Past Surgical History:  Past Surgical History:   Procedure Laterality Date   • CYST REMOVAL      center of back; benign   • EYE SURGERY     • INCISION AND DRAINAGE ABSCESS      back   • INCISION AND DRAINAGE LEG Right 12/10/2021    Procedure: INCISION AND DRAINAGE LOWER EXTREMITY;  Surgeon: Ash Leyva DPM;  Location: [facility];  Service: Podiatry;  Laterality: Right;   • OTHER SURGICAL HISTORY      Surgical clips left foot   • TOE SURGERY Right     Removal of 5th toe   • TRANS METATARSAL AMPUTATION Right 12/02/2021    Procedure: AMPUTATION TRANS METATARSAL;  Surgeon: Ash Leyva DPM;  Location: [facility];  Service: Podiatry;  Laterality: Right;   • VASCULAR SURGERY     • WRIST SURGERY Left     repair of injury       Family History:   family history includes Alcohol abuse in his nephew; Arthritis in his mother; COPD in his nephew; Cancer in his father and sister; Depression  in his mother; Diabetes in his paternal grandmother; Drug abuse in his nephew; Hearing loss in his brother and mother; Heart disease in his brother, father, and mother; Learning disabilities in his nephew.    Social History:    reports that he quit smoking about 34 years ago. His smoking use included cigarettes. He has never used smokeless tobacco. He reports that he does not currently use alcohol. He reports current drug use. Drug: Marijuana.    Home Medications:  HYDROcodone-acetaminophen, Insulin Lispro (1 Unit Dial), Semaglutide (1 MG/DOSE), Semaglutide (2 MG/DOSE), Semaglutide(0.25 or 0.5MG/DOS), acetaminophen, apixaban, digoxin, insulin detemir, lisinopril, naloxone, pregabalin, and simvastatin    Allergies:  Allergies   Allergen Reactions   • Adhesive Tape Rash       Review of Systems   All systems were reviewed and negative except for: H&P    Objective   Objective     Vitals:   Temp:  [98.1 °F (36.7 °C)-98.7 °F (37.1 °C)] 98.1 °F (36.7 °C)  Heart Rate:  [73-93] 74  Resp:  [16-20] 16  BP: (102-122)/(55-60) 102/55    Physical Exam      Constitutional: No distress.  Alert and conversational.  Respiratory: No wheeze noted.  GI: Abdomen: Obese.  Indurated edema lower abdominal  Neuro/psych:  Calm mood.  No dysarthria.  Extremities: chronic edema mike LE, R>L.  Right foot transmetatarsal amputation with dressing  Genitourinary.  No more scrotal swelling but penis still not visible         Result Review    Result Review:  I have personally reviewed the results from the time of this admission to 10/2/2024 15:29 EDT and agree with these findings:  [x]  Laboratory  []  Microbiology  []  Radiology  []  EKG/Telemetry   []  Cardiology/Vascular   []  Pathology  [x]  Old records  [x]  Other: Medications      Assessment & Plan   Assessment / Plan     Assessment:      Ulcerated, foot, left, limited to breakdown of skin [L97.521] Yes   Hospital-acquired weakness  Noncompliance  Hx of Influenza A  Hx of C. difficile diarrhea  treated  Generalized weakness  Deconditioning  DM (Kami Garcai and PCP)  HTN  PAF (on Eliquis; previously seen Dr. Duval)  Morbid obesity with BMI 44  Debility with wheelchair dependence  Hx of severe left hip pain  Severe degenerative joint disease of lower extremities  Hx L calcaneus osteomyelitis  Chronic venous stasis  Hx R transmetatarsal amputation  Chronic bilateral foot wounds with right transmetatarsal amputation, healing by secondary intention (wound care clinic; podiatrist Gordon)  Thrombocytopenia  Conjunctivitis'  Hemorrhoids  Anasarca.  Improving    Plan:     Remains on SNF  Low-salt diet, DCd fluid restriction as patient noncompliant, door dashing soft drinks.  Bladder scan  noted not retaining urine.    P.o. Bumex.  Finished IV Bumex  Status post metolazone for 3 days  Aldactone  Monitor kidney function intermittently closely as on  Bumex  Previously C diff studies consistent with colonization; d/w ID.   Ozempic was discontinued due to noncompliance with diet (DoorDashing routinely), GI intolerance, and plateau of weight loss.   Multiple discussions with patient over the past few months about the importance of compliance with diet and medical care, yet continues to refuse things routinely.  He complains that his hemorrhoids are bothering him again, that the Anusol helped last time.  As needed Anusol cream resumed.  We have discussed how detrimental this is to his overall health, can deconditioning and even longevity.    Palliative care was consulted as well given his poor mobility state and lack of compliance increasing his likelihood of a poor prognosis but patient not interested in pursuing palliative care at this time.  Patient has been evaluated by at least 3 different orthopedic surgeons, including a referral to [facility], and all have advised him that he must lose weight to be considered a candidate for surgery. Unfortunately he has not exercised the degree of compliance needed and advised and  has not lost any additional weight.  Per patient he was told to have weight around 280 pounds to be considered for surgery  Discussed with nursing, patient with reported repeated instances of disrespectful interactions with staff, such as suggesting they go back to nursing school, suggesting they go back to home country, being rude to food services because there were not enough noodles in his soup, and comments about staff appearance.  Patient also commented about me to the nursing staff as documented in nursing notes.  Bariatric surgery for weight loss discussed with patient.  He is thinking about it.  Discussed with nursing and .         VTE Prophylaxis:  Pharmacologic VTE prophylaxis orders are present.  Eliquis         CODE STATUS:    Code Status (Patient has no pulse and is not breathing): CPR (Attempt to Resuscitate)  Medical Interventions (Patient has pulse or is breathing): Full Support      Admission Status:  I believe this patient meets inpatient status.    Part of this note may be an electronic transcription/translation of spoken language to printed text using the Dragon Dictation System.     Electronically signed by Shekhar Au MD, 10/02/24, 3:29 PM EDT.

## 2024-10-02 NOTE — PLAN OF CARE
Goal Outcome Evaluation:  Plan of Care Reviewed With: patient        Progress: no change  Outcome Evaluation: Alert and oriented x4; able to make needs known. Repeatedly asked for water and ice all shift after explaining fluid restrictions multiple times. Resident told staff member he would door dash water today.  Instructed PCA to throw his water pitcher away since he couldn't use it. Medicated with Baclofen and Percocet for left hip pain with relief; see MAR. Blood sugar 431 at bedtime. Resident states he door dashed and ate mac & cheese, coleslaw, a bag of candy, and a Snapple in addition to dinner tray. Dr Alvarez notified of elevated blood sugar with no new orders. Resident states he wants flu vaccine; order entered for today. Attempted to weigh resident with harness but due to resident's size he did not fit properly and became agitated stating to stop. Resident states he will try to do standing weight on dayshift. Continues to have episodes of incontinence. Refuses turns. Call light within reach; continue plan of care.                                Follow up with your primary care doctor or clinics listed below if you do not have a doctor  Return immediately for any new or worsening symptoms or any new concerns

## 2024-10-02 NOTE — PLAN OF CARE
Goal Outcome Evaluation:  Plan of Care Reviewed With: patient        Progress: no change  Outcome Evaluation: rresident alert, oriented, able to make needs known to staff. Remains bedbound. Resident did get up briefly to obtain standing wt. last wt was Agust 5 at 158.3kg, todays wt 149.7kg. Resident had MD controlled wt loss with iv diuresis and fluid restrictions. noncompliant yesterday and today with fluid restriction. resident doordashed 2 bottles of coke, beef jerky and later 2 hotdogs from [employer]. MD in for f/u visit and spoke with resident about sodium content and purpose of fluid restrictions. Fluid restrictions were lifted due to noncompliance. Resident concerned over CHF dx. Educational material printed for resident to get better understanding of CHF. Resident stated he will not door dash fluids any longer and try to be more compliant with diet and fluids. Blood glucose elevated for all 3 meals, covered with sliding scale. adjustments made to Lantus per MD. will continue POC.

## 2024-10-02 NOTE — NURSING NOTE
Resident was given name and nuber for contact today, Max Roque, [phone], to set up payment plan. Admission visited patient room to get admission papers signed.

## 2024-10-03 LAB
ALBUMIN SERPL-MCNC: 2.8 G/DL (ref 3.5–5.2)
ANION GAP SERPL CALCULATED.3IONS-SCNC: 8.1 MMOL/L (ref 5–15)
BUN SERPL-MCNC: 21 MG/DL (ref 8–23)
BUN/CREAT SERPL: 35 (ref 7–25)
CALCIUM SPEC-SCNC: 8.1 MG/DL (ref 8.6–10.5)
CHLORIDE SERPL-SCNC: 103 MMOL/L (ref 98–107)
CO2 SERPL-SCNC: 24.9 MMOL/L (ref 22–29)
CREAT SERPL-MCNC: 0.6 MG/DL (ref 0.76–1.27)
EGFRCR SERPLBLD CKD-EPI 2021: 107.1 ML/MIN/1.73
GLUCOSE BLDC GLUCOMTR-MCNC: 160 MG/DL (ref 70–99)
GLUCOSE BLDC GLUCOMTR-MCNC: 163 MG/DL (ref 70–99)
GLUCOSE BLDC GLUCOMTR-MCNC: 211 MG/DL (ref 70–99)
GLUCOSE BLDC GLUCOMTR-MCNC: 243 MG/DL (ref 70–99)
GLUCOSE SERPL-MCNC: 163 MG/DL (ref 65–99)
INDURATION: 0 MM (ref 0–10)
Lab: NORMAL
Lab: NORMAL
PHOSPHATE SERPL-MCNC: 3.4 MG/DL (ref 2.5–4.5)
POTASSIUM SERPL-SCNC: 4.1 MMOL/L (ref 3.5–5.2)
SARS-COV-2 RNA RESP QL NAA+PROBE: NOT DETECTED
SODIUM SERPL-SCNC: 136 MMOL/L (ref 136–145)
TB SKIN TEST: NEGATIVE
WHOLE BLOOD HOLD SPECIMEN: NORMAL

## 2024-10-03 PROCEDURE — 87635 SARS-COV-2 COVID-19 AMP PRB: CPT | Performed by: INTERNAL MEDICINE

## 2024-10-03 PROCEDURE — 80069 RENAL FUNCTION PANEL: CPT | Performed by: INTERNAL MEDICINE

## 2024-10-03 PROCEDURE — 63710000001 INSULIN LISPRO (HUMAN) PER 5 UNITS: Performed by: INTERNAL MEDICINE

## 2024-10-03 PROCEDURE — 63710000001 INSULIN GLARGINE PER 5 UNITS: Performed by: INTERNAL MEDICINE

## 2024-10-03 PROCEDURE — 82948 REAGENT STRIP/BLOOD GLUCOSE: CPT

## 2024-10-03 RX ORDER — METOPROLOL SUCCINATE 25 MG/1
12.5 TABLET, EXTENDED RELEASE ORAL
Status: DISCONTINUED | OUTPATIENT
Start: 2024-10-03 | End: 2024-10-14 | Stop reason: HOSPADM

## 2024-10-03 RX ADMIN — CETIRIZINE HYDROCHLORIDE 10 MG: 10 TABLET, FILM COATED ORAL at 08:53

## 2024-10-03 RX ADMIN — ATORVASTATIN CALCIUM 10 MG: 10 TABLET, FILM COATED ORAL at 20:34

## 2024-10-03 RX ADMIN — OXYCODONE AND ACETAMINOPHEN 1 TABLET: 7.5; 325 TABLET ORAL at 15:56

## 2024-10-03 RX ADMIN — DICLOFENAC SODIUM 4 G: 10 GEL TOPICAL at 08:54

## 2024-10-03 RX ADMIN — SERTRALINE HYDROCHLORIDE 50 MG: 50 TABLET ORAL at 20:34

## 2024-10-03 RX ADMIN — SPIRONOLACTONE 25 MG: 25 TABLET ORAL at 17:34

## 2024-10-03 RX ADMIN — Medication 500 MG: at 20:32

## 2024-10-03 RX ADMIN — BACLOFEN 10 MG: 10 TABLET ORAL at 07:21

## 2024-10-03 RX ADMIN — CYANOCOBALAMIN TAB 500 MCG 1000 MCG: 500 TAB at 08:52

## 2024-10-03 RX ADMIN — DICLOFENAC SODIUM 4 G: 10 GEL TOPICAL at 14:13

## 2024-10-03 RX ADMIN — BACLOFEN 10 MG: 10 TABLET ORAL at 15:56

## 2024-10-03 RX ADMIN — BACLOFEN 10 MG: 10 TABLET ORAL at 23:46

## 2024-10-03 RX ADMIN — IBUPROFEN 400 MG: 400 TABLET ORAL at 03:33

## 2024-10-03 RX ADMIN — Medication 500 MG: at 08:53

## 2024-10-03 RX ADMIN — SPIRONOLACTONE 25 MG: 25 TABLET ORAL at 08:53

## 2024-10-03 RX ADMIN — INSULIN GLARGINE 22 UNITS: 100 INJECTION, SOLUTION SUBCUTANEOUS at 20:38

## 2024-10-03 RX ADMIN — INSULIN LISPRO 3 UNITS: 100 INJECTION, SOLUTION INTRAVENOUS; SUBCUTANEOUS at 20:38

## 2024-10-03 RX ADMIN — INSULIN LISPRO 5 UNITS: 100 INJECTION, SOLUTION INTRAVENOUS; SUBCUTANEOUS at 12:07

## 2024-10-03 RX ADMIN — IBUPROFEN 400 MG: 400 TABLET ORAL at 20:33

## 2024-10-03 RX ADMIN — INSULIN GLARGINE 25 UNITS: 100 INJECTION, SOLUTION SUBCUTANEOUS at 08:54

## 2024-10-03 RX ADMIN — OXYCODONE AND ACETAMINOPHEN 1 TABLET: 7.5; 325 TABLET ORAL at 23:46

## 2024-10-03 RX ADMIN — FERROUS SULFATE TAB 325 MG (65 MG ELEMENTAL FE) 325 MG: 325 (65 FE) TAB at 08:53

## 2024-10-03 RX ADMIN — APIXABAN 5 MG: 5 TABLET, FILM COATED ORAL at 20:34

## 2024-10-03 RX ADMIN — EMPAGLIFLOZIN 10 MG: 10 TABLET, FILM COATED ORAL at 08:53

## 2024-10-03 RX ADMIN — INSULIN LISPRO 5 UNITS: 100 INJECTION, SOLUTION INTRAVENOUS; SUBCUTANEOUS at 08:53

## 2024-10-03 RX ADMIN — PREGABALIN 25 MG: 25 CAPSULE ORAL at 15:56

## 2024-10-03 RX ADMIN — OXYCODONE AND ACETAMINOPHEN 1 TABLET: 7.5; 325 TABLET ORAL at 07:33

## 2024-10-03 RX ADMIN — BISMUTH SUBSALICYLATE 524 MG: 262 TABLET, CHEWABLE ORAL at 20:07

## 2024-10-03 RX ADMIN — CHOLESTYRAMINE 4 G: 4 POWDER, FOR SUSPENSION ORAL at 08:54

## 2024-10-03 RX ADMIN — DICLOFENAC SODIUM 4 G: 10 GEL TOPICAL at 20:32

## 2024-10-03 RX ADMIN — HYDROCORTISONE ACETATE 1 APPLICATION: 1 CREAM TOPICAL at 09:24

## 2024-10-03 RX ADMIN — MONTELUKAST 10 MG: 10 TABLET, FILM COATED ORAL at 20:32

## 2024-10-03 RX ADMIN — METOPROLOL SUCCINATE 12.5 MG: 25 TABLET, FILM COATED, EXTENDED RELEASE ORAL at 10:32

## 2024-10-03 RX ADMIN — LISINOPRIL 2.5 MG: 2.5 TABLET ORAL at 08:53

## 2024-10-03 RX ADMIN — PREGABALIN 25 MG: 25 CAPSULE ORAL at 20:33

## 2024-10-03 RX ADMIN — PREGABALIN 25 MG: 25 CAPSULE ORAL at 08:53

## 2024-10-03 RX ADMIN — DICLOFENAC SODIUM 4 G: 10 GEL TOPICAL at 03:34

## 2024-10-03 RX ADMIN — BUMETANIDE 2 MG: 1 TABLET ORAL at 08:52

## 2024-10-03 RX ADMIN — INSULIN LISPRO 3 UNITS: 100 INJECTION, SOLUTION INTRAVENOUS; SUBCUTANEOUS at 17:34

## 2024-10-03 RX ADMIN — BISMUTH SUBSALICYLATE 524 MG: 262 TABLET, CHEWABLE ORAL at 00:09

## 2024-10-03 RX ADMIN — APIXABAN 5 MG: 5 TABLET, FILM COATED ORAL at 08:52

## 2024-10-03 RX ADMIN — Medication 10 MG: at 20:34

## 2024-10-03 RX ADMIN — DIGOXIN 125 MCG: 125 TABLET ORAL at 12:07

## 2024-10-03 NOTE — PLAN OF CARE
Goal Outcome Evaluation:              Outcome Evaluation: Pt is AO x 4 and VSS. He uses the bedpan and urinal and sometimes is incontinent. He was medicated for pain x 2 and with Pepcid, somewhat effective. Nightime blood sugar [id number] and he was given insulin. Will continue with his POC. Call light and personal items within reach.

## 2024-10-03 NOTE — NURSING NOTE
"While assisting Rsd. with urinal he started singing \"Im gonna wrap my pecker around a tree.\"  Rsd. Was smiling and laughing and asked this nurse if she liked his song, that he made it up tonight.  This Nurse told patient it was inappropriate.  Finished assisting Rsd. With urinal, safety check performed at this time, and asked Rsd. If there was anything else he needed at this time.  Rsd. Denied any needs at this time.  Call light and personal items within reach.  Rsd. Reminded to use call light for assistance.  Verbalizes understanding.  Will continue to monitor and notify Primary RN of behaviors (Elly DANIELS RN).  "

## 2024-10-04 LAB
ALBUMIN SERPL-MCNC: 2.9 G/DL (ref 3.5–5.2)
ANION GAP SERPL CALCULATED.3IONS-SCNC: 7.5 MMOL/L (ref 5–15)
BUN SERPL-MCNC: 25 MG/DL (ref 8–23)
BUN/CREAT SERPL: 37.9 (ref 7–25)
CALCIUM SPEC-SCNC: 8.3 MG/DL (ref 8.6–10.5)
CHLORIDE SERPL-SCNC: 99 MMOL/L (ref 98–107)
CO2 SERPL-SCNC: 26.5 MMOL/L (ref 22–29)
CREAT SERPL-MCNC: 0.66 MG/DL (ref 0.76–1.27)
EGFRCR SERPLBLD CKD-EPI 2021: 104.1 ML/MIN/1.73
GLUCOSE BLDC GLUCOMTR-MCNC: 127 MG/DL (ref 70–99)
GLUCOSE BLDC GLUCOMTR-MCNC: 155 MG/DL (ref 70–99)
GLUCOSE BLDC GLUCOMTR-MCNC: 183 MG/DL (ref 70–99)
GLUCOSE BLDC GLUCOMTR-MCNC: 187 MG/DL (ref 70–99)
GLUCOSE SERPL-MCNC: 196 MG/DL (ref 65–99)
PHOSPHATE SERPL-MCNC: 4.2 MG/DL (ref 2.5–4.5)
POTASSIUM SERPL-SCNC: 4.5 MMOL/L (ref 3.5–5.2)
SODIUM SERPL-SCNC: 133 MMOL/L (ref 136–145)
WHOLE BLOOD HOLD SPECIMEN: NORMAL

## 2024-10-04 PROCEDURE — 63710000001 INSULIN GLARGINE PER 5 UNITS: Performed by: INTERNAL MEDICINE

## 2024-10-04 PROCEDURE — 80069 RENAL FUNCTION PANEL: CPT | Performed by: INTERNAL MEDICINE

## 2024-10-04 PROCEDURE — 82948 REAGENT STRIP/BLOOD GLUCOSE: CPT

## 2024-10-04 PROCEDURE — 63710000001 INSULIN LISPRO (HUMAN) PER 5 UNITS: Performed by: INTERNAL MEDICINE

## 2024-10-04 RX ADMIN — BUMETANIDE 2 MG: 1 TABLET ORAL at 09:59

## 2024-10-04 RX ADMIN — FERROUS SULFATE TAB 325 MG (65 MG ELEMENTAL FE) 325 MG: 325 (65 FE) TAB at 08:41

## 2024-10-04 RX ADMIN — EMPAGLIFLOZIN 10 MG: 10 TABLET, FILM COATED ORAL at 09:59

## 2024-10-04 RX ADMIN — INSULIN LISPRO 3 UNITS: 100 INJECTION, SOLUTION INTRAVENOUS; SUBCUTANEOUS at 12:09

## 2024-10-04 RX ADMIN — APIXABAN 5 MG: 5 TABLET, FILM COATED ORAL at 20:43

## 2024-10-04 RX ADMIN — SPIRONOLACTONE 25 MG: 25 TABLET ORAL at 17:40

## 2024-10-04 RX ADMIN — Medication 500 MG: at 20:43

## 2024-10-04 RX ADMIN — CETIRIZINE HYDROCHLORIDE 10 MG: 10 TABLET, FILM COATED ORAL at 09:59

## 2024-10-04 RX ADMIN — SPIRONOLACTONE 25 MG: 25 TABLET ORAL at 09:59

## 2024-10-04 RX ADMIN — BACLOFEN 10 MG: 10 TABLET ORAL at 16:31

## 2024-10-04 RX ADMIN — PREGABALIN 25 MG: 25 CAPSULE ORAL at 09:59

## 2024-10-04 RX ADMIN — PREGABALIN 25 MG: 25 CAPSULE ORAL at 20:43

## 2024-10-04 RX ADMIN — HYDROCORTISONE 2.5%: 25 CREAM TOPICAL at 20:44

## 2024-10-04 RX ADMIN — OXYCODONE AND ACETAMINOPHEN 1 TABLET: 7.5; 325 TABLET ORAL at 08:41

## 2024-10-04 RX ADMIN — DIGOXIN 125 MCG: 125 TABLET ORAL at 12:09

## 2024-10-04 RX ADMIN — LISINOPRIL 2.5 MG: 2.5 TABLET ORAL at 09:59

## 2024-10-04 RX ADMIN — OXYCODONE AND ACETAMINOPHEN 1 TABLET: 7.5; 325 TABLET ORAL at 16:31

## 2024-10-04 RX ADMIN — IBUPROFEN 400 MG: 400 TABLET ORAL at 14:00

## 2024-10-04 RX ADMIN — INSULIN GLARGINE 25 UNITS: 100 INJECTION, SOLUTION SUBCUTANEOUS at 09:59

## 2024-10-04 RX ADMIN — INSULIN LISPRO 3 UNITS: 100 INJECTION, SOLUTION INTRAVENOUS; SUBCUTANEOUS at 20:42

## 2024-10-04 RX ADMIN — Medication 500 MG: at 09:59

## 2024-10-04 RX ADMIN — PREGABALIN 25 MG: 25 CAPSULE ORAL at 16:31

## 2024-10-04 RX ADMIN — SERTRALINE HYDROCHLORIDE 50 MG: 50 TABLET ORAL at 20:43

## 2024-10-04 RX ADMIN — CYANOCOBALAMIN TAB 500 MCG 1000 MCG: 500 TAB at 09:59

## 2024-10-04 RX ADMIN — METOPROLOL SUCCINATE 12.5 MG: 25 TABLET, FILM COATED, EXTENDED RELEASE ORAL at 09:59

## 2024-10-04 RX ADMIN — INSULIN GLARGINE 22 UNITS: 100 INJECTION, SOLUTION SUBCUTANEOUS at 20:42

## 2024-10-04 RX ADMIN — Medication 10 MG: at 20:43

## 2024-10-04 RX ADMIN — ATORVASTATIN CALCIUM 10 MG: 10 TABLET, FILM COATED ORAL at 20:43

## 2024-10-04 RX ADMIN — APIXABAN 5 MG: 5 TABLET, FILM COATED ORAL at 09:59

## 2024-10-04 RX ADMIN — MONTELUKAST 10 MG: 10 TABLET, FILM COATED ORAL at 20:43

## 2024-10-04 RX ADMIN — CHOLESTYRAMINE 4 G: 4 POWDER, FOR SUSPENSION ORAL at 09:59

## 2024-10-04 RX ADMIN — INSULIN LISPRO 3 UNITS: 100 INJECTION, SOLUTION INTRAVENOUS; SUBCUTANEOUS at 17:40

## 2024-10-04 RX ADMIN — BACLOFEN 10 MG: 10 TABLET ORAL at 08:41

## 2024-10-04 NOTE — PROGRESS NOTES
"Nursing Facility Medication Regimen Review    Potentially Clinically Significant Medication Issues during this review: []Identified   [x]Not identified    Provider acknowledgement required?   []Yes   [x]No    Jose Shaikh is a 63 y.o.male admitted by Cailin Acosta MD , on 10/1/2024  3:06 PM , for Generalized weakness [R53.1]  Generalized weakness [R53.1]    Visit Vitals  /54 (BP Location: Right arm, Patient Position: Lying)   Pulse 61   Temp 97.8 °F (36.6 °C) (Oral)   Resp 18   Ht 188 cm (74.02\")   Wt (!) 150 kg (330 lb 0.5 oz)   SpO2 96%   BMI 42.35 kg/m²        Lab Results   Component Value Date    GLUCOSE 196 (H) 10/04/2024    BUN 25 (H) 10/04/2024    CREATININE 0.66 (L) 10/04/2024    EGFRIFNONA 134 12/20/2021    BCR 37.9 (H) 10/04/2024    K 4.5 10/04/2024    CO2 26.5 10/04/2024    CALCIUM 8.3 (L) 10/04/2024    ALBUMIN 2.9 (L) 10/04/2024    LABIL2 1.3 (L) 08/07/2019    AST 36 09/15/2024    ALT 24 09/15/2024    WBC 4.51 09/15/2024    HGB 10.2 (L) 09/15/2024    HCT 33.0 (L) 09/15/2024    MCV 84.8 09/15/2024     (L) 09/15/2024        Active Ambulatory Problems     Diagnosis Date Noted    Diabetic ulcer of left heel associated with type 2 DM 07/06/2021    Acute osteomyelitis of left calcaneus  08/18/2021    Diabetic ulcer of left heel associated with type 2 DM 08/18/2021    Diabetic ulcer of right midfoot associated with type 2 DM 08/18/2021    Paroxysmal atrial fibrillation 08/31/2021    Essential hypertension 08/31/2021    Hyperlipidemia LDL goal <100 08/31/2021    Cellulitis and abscess of foot 12/01/2021    High alkaline phosphatase level 12/19/2021    Osteomyelitis 10/01/2022    Onychomycosis 10/02/2022    Onychocryptosis 10/02/2022    Foot pain, bilateral 10/02/2022    Osteomyelitis of foot, right, acute 10/05/2022    Cellulitis of right foot 10/05/2022    Type 2 diabetes mellitus, with long-term current use of insulin 11/08/2022    Class 3 severe obesity due to excess calories with serious " comorbidity and body mass index (BMI) of 45.0 to 49.9 in adult 11/08/2022    Anxiety disorder, unspecified 10/07/2022    Claustrophobia 05/02/2022    Dependence on wheelchair 10/27/2022    Depression, unspecified 05/02/2022    Long term (current) use of anticoagulants 05/02/2022    Long term (current) use of oral hypoglycemic drugs 05/02/2022    Wound of foot 05/02/2022    Ulcer of right foot 05/02/2022    Orthostatic hypotension 10/07/2022    Other chronic osteomyelitis, right ankle and foot 10/07/2022    Personal history of nicotine dependence 05/02/2022    Thrombocytopenia, unspecified 10/07/2022    Unspecified open wound, right foot, initial encounter 04/03/2023    Diabetic foot infection 04/03/2023    Subacute osteomyelitis of right foot 04/06/2023    Right foot pain 05/12/2023    Sepsis 05/12/2023    Onychomycosis 05/22/2023    Foot pain, left 05/22/2023    Impaired mobility and ADLs 06/16/2023    Absence of toe of right foot 07/11/2023    Corns and callosity 07/11/2023    Disability of walking 07/11/2023    Fracture 07/11/2023    Limb swelling 07/11/2023    Polyneuropathy 07/11/2023    Pressure ulcer, stage 1 07/11/2023    Shortness of breath 07/11/2023    Generalized weakness 09/10/2023    Debility 11/06/2023    Onychomycosis 01/26/2024    Noncompliance of patient with dietary regimen 08/18/2024    Morbid obesity 10/01/2024     Resolved Ambulatory Problems     Diagnosis Date Noted    Lactic acidosis 12/01/2021    Abscess of back 12/20/2021    Ulcerated, foot, left, limited to breakdown of skin 01/26/2024     Past Medical History:   Diagnosis Date    Anxiety and depression     Arthritis     Cancer     Chronic pain     Corns and callus     Difficulty walking     Hammertoe     Ingrown toenail     Obesity     Tinea unguium     Type 2 diabetes mellitus with polyneuropathy         Hospital Medications (active)         Dose Frequency Indication    acetaminophen (TYLENOL) tablet 650 mg 650 mg Every 4 Hours PRN Mild  pain/fever    Admin Instructions: Based on patient request - if ordered for moderate or severe pain, provider allows for administration of a medication prescribed for a lower pain scale.    Do not exceed 4 grams of acetaminophen in a 24 hr period. Max dose of 2gm for AST/ALT greater than 120 units/L.    If given for pain, use the following pain scale:   Mild Pain = Pain Score of 1-3, CPOT 1-2  Moderate Pain = Pain Score of 4-6, CPOT 3-4  Severe Pain = Pain Score of 7-10, CPOT 5-8     Route: Oral     apixaban (ELIQUIS) tablet 5 mg 5 mg Every 12 Hours Scheduled A. fib    Admin Instructions: .  Tablet may be crushed and suspended in 60 mL of water or D5W and immediately delivered via NG tube.     Route: Oral     atorvastatin (LIPITOR) tablet 10 mg 10 mg Nightly HLD    Admin Instructions: Avoid grapefruit juice.     Route: Oral     baclofen (LIORESAL) tablet 10 mg 10 mg 3 Times Daily PRN Muscle spasms    Admin Instructions: .  Take with food if GI upset occurs.     Route: Oral     benzocaine (HURRICAINE/ORAJEL) 20 % jelly  4 Times Daily PRN Mucositis    Admin Instructions:      Route: Mouth/Throat     benzonatate (TESSALON) capsule 100 mg 100 mg 3 Times Daily PRN Cough    Admin Instructions: Do not crush or chew the capsules or tablets. The drug may not work as designed if the capsule or tablet is crushed or chewed. Swallow whole.  Swallow whole.  Do not crush, chew, or open capsule.     Route: Oral     bismuth subsalicylate (PEPTO BISMOL) chewable tablet 524 mg 524 mg 3 Times Daily PRN Heartburn    Admin Instructions: Maximum 4192 mg per 24 hours.     Route: Oral     bumetanide (BUMEX) tablet 2 mg 2 mg Daily Excess fluid    Admin Instructions: Hold for SBP less than 100, DBP less than 50.  Take with food if GI upset occurs.     Route: Oral     cetirizine (zyrTEC) tablet 10 mg 10 mg Daily Allergies    Route: Oral     cholestyramine light packet 4 g 1 packet Daily diarrhea    Admin Instructions: Give with food.  Give  other meds 1 hour before or 4 hours after cholestyramine.     Route: Oral     Diclofenac Sodium (VOLTAREN) 1 % gel 4 g 4 g Every 6 Hours Arthritis    Admin Instructions: Apply to bilateral hip and knee   Do not cover area with occlusive dressing or apply any other med to affected area. Do not wash affected area for 1 hr after applying. Use dosing card for correct dose measurement.     Route: Topical     digoxin (LANOXIN) tablet 125 mcg 125 mcg Daily Digoxin HR    Admin Instructions:  Check and record heart rate.     Route: Oral     empagliflozin (JARDIANCE) tablet 10 mg 10 mg Daily DM    Route: Oral     ferrous sulfate tablet 325 mg 325 mg Daily With Breakfast anemia    Admin Instructions: .  Swallow whole. Do not crush, split, or chew. Take with food if GI upset occurs.     Route: Oral     Hydrocortisone (Perianal) (ANUSOL-HC) 2.5 % rectal cream  4 Times Daily PRN Skin care    Route: Rectal     hydrocortisone 1 % cream 1 Application 1 Application User Specified Skin care    Admin Instructions: Apply to dry, red tissue on right anterior lower leg and left posterior lower leg.     Route: Topical     ibuprofen (ADVIL,MOTRIN) tablet 400 mg 400 mg 2 Times Daily PRN Pain    Admin Instructions: May alternate with tylenol  Based on patient request - if ordered for moderate or severe pain, provider allows for administration of a medication prescribed for a lower pain scale.    Mucous membrane irritant. Do not crush or chew tablet or capsule unless administered through a feeding tube.  If given for pain, use the following pain scale:  Mild Pain = Pain Score of 1-3, CPOT 1-2  Moderate Pain = Pain Score of 4-6, CPOT 3-4  Severe Pain = Pain Score of 7-10, CPOT 5-8     Route: Oral     insulin glargine (LANTUS, SEMGLEE) injection 22 Units 22 Units Nightly DM    Admin Instructions: Do not hold basal insulin without an order. Consider requesting a dose edit, if needed.       Route: Subcutaneous     insulin glargine (LANTUS,  "SEMGLEE) injection 25 Units 25 Units Daily DM    Admin Instructions: Do not hold basal insulin without an order. Consider requesting a dose edit, if needed.       Route: Subcutaneous     Insulin Lispro (humaLOG) injection 3-14 Units 3-14 Units 4 Times Daily Before Meals & Nightly DM    Admin Instructions: Correction Insulin - Moderate-High Dose (Total Insulin Dose 60-80 units/day, Patient Taking Insulin at Home)    Blood Glucose 150-199 mg/dL - 3 units  Blood Glucose 200-249 mg/dL - 5 units  Blood Glucose 250-299 mg/dL - 8 units  Blood Glucose 300-349 mg/dL - 10 units  Blood Glucose 350-400 mg/dL - 12 units  Blood Glucose Greater Than 400 mg/dL - 14 units & Call Provider   Caution: Look alike/sound alike drug alert     Route: Subcutaneous     lisinopril (PRINIVIL,ZESTRIL) tablet 2.5 mg 2.5 mg Every 24 Hours Scheduled HTN    Admin Instructions: Hold for SBP less than 100.     Route: Oral     melatonin tablet 10 mg 10 mg Nightly Sleep    Route: Oral     metoprolol succinate XL (TOPROL-XL) 24 hr tablet 12.5 mg 12.5 mg Every 24 Hours Scheduled HTN    Admin Instructions: Hold for SBP less than 100, DBP less than 60, or heart rate less than 50    Do not crush or chew the capsules or tablets. The drug may not work as designed if the capsule or tablet is crushed or chewed. Swallow whole.  Do not crush or chew.     Route: Oral     montelukast (SINGULAIR) tablet 10 mg 10 mg Nightly allergies    Route: Oral     ondansetron (ZOFRAN) injection 4 mg 4 mg Every 6 Hours PRN Nausea, vomiting    Admin Instructions: .If BOTH ondansetron (ZOFRAN) and promethazine (PHENERGAN) are ordered use ondansetron first and THEN promethazine IF ondansetron is ineffective.     Route: Intravenous     ondansetron ODT (ZOFRAN-ODT) disintegrating tablet 4 mg 4 mg Every 6 Hours PRN Nausea, vomiting    Admin Instructions: .\"If multiple N/V medications ordered, use in the following order: Ondansetron, Prochlorperazine, Promethazine. Use PO unless patient " "refuses or patient unable to swallow.\"  Place on tongue and allow to dissolve.     Route: Oral     oxyCODONE-acetaminophen (PERCOCET) 7.5-325 MG per tablet 1 tablet 1 tablet Every 6 Hours PRN Pain    Admin Instructions: Based on patient request - if ordered for moderate or severe pain, provider allows for administration of a medication prescribed for a lower pain scale.  [NIKIA]    Do not exceed 4 grams of acetaminophen in a 24 hr period. Max dose of 2gm for AST/ALT greater than 120 units/L        If given for pain, use the following pain scale:   Mild Pain = Pain Score of 1-3, CPOT 1-2  Moderate Pain = Pain Score of 4-6, CPOT 3-4  Severe Pain = Pain Score of 7-10, CPOT 5-8     Route: Oral     polyethyl glycol-propyl glycol (SYSTANE) 0.4-0.3 % ophthalmic solution (artificial tears) 1 drop 1 drop Every 1 Hour PRN Dry eyes    Route: Both Eyes     pregabalin (LYRICA) capsule 25 mg 25 mg 3 Times Daily Neuropathy    Admin Instructions:      Route: Oral     saccharomyces boulardii (FLORASTOR) capsule 500 mg 500 mg 2 Times Daily Probiotic    Route: Oral     sertraline (ZOLOFT) tablet 50 mg 50 mg Nightly Anxiety/depression    Route: Oral     simethicone (MYLICON) chewable tablet 80 mg 80 mg 4 Times Daily PRN Flatulence     Admin Instructions: .     Route: Oral     spironolactone (ALDACTONE) tablet 25 mg 25 mg 2 Times Daily (Diuretics) HTN/ fluid status    Admin Instructions: Group 1 (Yellow) Hazardous Drug - See Handling Guide     Route: Oral     vitamin B-12 (CYANOCOBALAMIN) tablet 1,000 mcg 1,000 mcg Daily Supplement    Admin Instructions: .     Route: Oral                Psychotropic Medications  Sertraline  Pregabalin     Potentially Clinically Significant Medication Issues/PharmD Recommendations:   Psychotropic Medication: sertraline, pregabalin  Opioid Use Review: oxycodone/acetaminophen 7.5/325 PRN Q6H - takes ~3x daily  Medication without an appropriate indication: N/A  Untreated indication: N/A  Missing Duration: " N/A  Excessive or Inadequate Dose: N/A  Ineffective Drug Therapy: N/A  Medication Adverse Reactions/Consequences: N/A  Medication Monitoring: Monitor ibuprofen use with Apixaban (increased risk of bleeding)  Drug Interactions: Monitor kidney function with bumetanide and ibuprofen use  Duplicate Therapy: N/A  PTA Medication Omissions (not currently ordered): N/A  Safety: monitor for signs/symptoms of bleeding given use of ibuprofen and Apixaban          In completing this Drug Regimen Review for [employer] [facility] Quisenberry, have reviewed the electronic medical chart contents, including but not limited to the following: Medication Administration Records (MAR), Prescriber's orders, Progress, nursing and consultants' notes, Laboratory and diagnostic test results, Other sources of information about documented expressions or indications of distress and/or changes in condition.

## 2024-10-04 NOTE — CONSULTS
"Nutrition Services    Patient Name: Jose Shaikh  YOB: 1960  MRN: [id number]  Admission date: 10/1/2024      CLINICAL NUTRITION ASSESSMENT      Reason for Assessment  Nursing Facility Admission   H&P:  Past Medical History:   Diagnosis Date    Absence of toe of right foot     Acute osteomyelitis of left calcaneus  08/18/2021    Anxiety and depression     Arthritis     Cancer     Chronic pain     STATES HIS PAIN IS 10/10 AAT    Claustrophobia     Corns and callus     Diabetic ulcer of left heel associated with type 2 DM 08/18/2021    Diabetic ulcer of left heel associated with type 2 DM 07/06/2021    Diabetic ulcer of right midfoot associated with type 2 DM 08/18/2021    Difficulty walking     Essential hypertension 08/31/2021    Hammertoe     Hyperlipidemia LDL goal <100 08/31/2021    Ingrown toenail     Obesity     Paroxysmal atrial fibrillation 08/31/2021    Polyneuropathy     Pressure ulcer, stage 1     Tinea unguium     Type 2 diabetes mellitus with polyneuropathy         Current Problems:   Active Hospital Problems    Diagnosis     **Generalized weakness         Nutrition/Diet History         Narrative   RD familiar with patient from previous admission. Upon my visit, patient in bed watching television. Pt reports his appetite is okay. Denies difficulties c/s. Denies N/V/D/C. NKFA. Pt declines offer for nutrition supplements.     Section K completed.     Anthropometrics        Current Height, Weight Height: 188 cm (74.02\")  Weight:  (Patient refused, RN notified.)   Current BMI Body mass index is 42.35 kg/m².   BMI Classification Obese Class III   % %   Adjusted Body Weight (ABW) 109 kg   Weight Hx  Wt Readings from Last 30 Encounters:   10/02/24 0805 (!) 150 kg (330 lb 0.5 oz)   08/05/24 0732 (!) 158 kg (348 lb 15.8 oz)   07/22/24 0800 (!) 156 kg (344 lb 9.3 oz)   07/08/24 0900 (!) 156 kg (343 lb 14.7 oz)   06/27/24 0745 (!) 156 kg (343 lb 4.1 oz)   05/31/24 1400 (!) 151 kg (333 lb 12.4 " oz)   05/22/24 1000 (!) 157 kg (346 lb 2 oz)   05/15/24 0545 (!) 153 kg (338 lb 3 oz)   05/08/24 1100 (!) 154 kg (340 lb 6.2 oz)   05/08/24 1029 (!) 154 kg (340 lb 6.2 oz)   05/01/24 1100 (!) 156 kg (343 lb 14.7 oz)   04/24/24 0900 (!) 159 kg (350 lb 1.5 oz)   04/17/24 1124 (!) 161 kg (355 lb 9.6 oz)   04/11/24 1509 (!) 162 kg (356 lb 11.3 oz)   04/02/24 1118 (!) 157 kg (345 lb 0.3 oz)   03/26/24 1003 (!) 143 kg (314 lb 2.5 oz)   03/18/24 1323 (!) 151 kg (332 lb 3.7 oz)   03/18/24 0500 (!) 171 kg (378 lb 1.4 oz)   03/17/24 0500 (!) 173 kg (380 lb 15.3 oz)   03/16/24 0500 (!) 171 kg (376 lb 15.8 oz)   03/15/24 0500 (!) 161 kg (354 lb 8 oz)   03/14/24 0300 (!) 173 kg (382 lb 4.4 oz)   03/12/24 0500 (!) 158 kg (347 lb 14.2 oz)   03/11/24 0500 (!) 153 kg (337 lb 8.4 oz)   03/10/24 0540 (!) 154 kg (339 lb 4.6 oz)   03/09/24 0500 (!) 153 kg (337 lb 1.3 oz)   03/08/24 1323 (!) 153 kg (337 lb 8 oz)   03/08/24 0500 (!) 157 kg (346 lb 2 oz)   03/06/24 2300 (!) 155 kg (342 lb 2.5 oz)   03/05/24 0415 (!) 155 kg (342 lb 13 oz)   03/04/24 0500 (!) 156 kg (344 lb 9.3 oz)   03/03/24 0500 (!) 156 kg (344 lb 9.3 oz)   03/02/24 0530 (!) 157 kg (346 lb 12.5 oz)   03/01/24 0500 (!) 157 kg (345 lb 3.9 oz)   02/29/24 0500 (!) 157 kg (347 lb 3.6 oz)   02/28/24 0509 (!) 158 kg (347 lb 14.2 oz)   02/27/24 0500 (!) 159 kg (351 lb 3.1 oz)   02/26/24 0500 (!) 159 kg (350 lb 12 oz)   02/25/24 0500 (!) 160 kg (351 lb 10.1 oz)   02/24/24 0500 (!) 160 kg (352 lb 1.2 oz)   02/23/24 0500 (!) 160 kg (353 lb 6.4 oz)   02/22/24 0500 (!) 160 kg (353 lb 13.4 oz)   02/21/24 0514 (!) 162 kg (356 lb 7.7 oz)   02/20/24 0500 (!) 161 kg (355 lb 2.6 oz)   02/19/24 0300 (!) 160 kg (353 lb 6.4 oz)   02/18/24 0500 (!) 162 kg (356 lb 7.7 oz)   02/17/24 0500 (!) 162 kg (357 lb 2.3 oz)   02/16/24 0500 (!) 162 kg (358 lb 0.4 oz)   02/15/24 0532 (!) 163 kg (360 lb 3.7 oz)   02/14/24 0600 (!) 162 kg (357 lb 5.9 oz)   02/13/24 0500 (!) 162 kg (357 lb 9.4 oz)   02/12/24  1352 (!) 160 kg (352 lb 4.7 oz)   02/12/24 0500 (!) 166 kg (367 lb 1.1 oz)   02/11/24 0500 (!) 169 kg (372 lb 2.2 oz)   02/10/24 0500 (!) 168 kg (370 lb 9.5 oz)   02/09/24 0600 (!) 168 kg (370 lb 9.5 oz)   02/08/24 0600 (!) 165 kg (363 lb 15.7 oz)   02/07/24 0600 (!) 166 kg (366 lb 10 oz)   02/06/24 0419 (!) 166 kg (366 lb 10 oz)   02/05/24 0500 (!) 167 kg (367 lb 4.6 oz)   02/04/24 0500 (!) 167 kg (367 lb 8.1 oz)   02/03/24 0500 (!) 166 kg (366 lb 6.5 oz)   02/02/24 0500 (!) 168 kg (369 lb 14.9 oz)   02/01/24 0613 (!) 169 kg (372 lb 5.7 oz)   01/31/24 0500 (!) 168 kg (371 lb 4.1 oz)   01/30/24 0500 (!) 169 kg (372 lb 12.8 oz)   01/29/24 0232 (!) 169 kg (372 lb 5.7 oz)   01/28/24 0500 (!) 167 kg (368 lb 6.2 oz)   01/26/24 0400 (!) 169 kg (372 lb 2.2 oz)   01/25/24 0417 (!) 167 kg (368 lb 9.8 oz)   01/24/24 0608 (!) 168 kg (371 lb 7.6 oz)   01/23/24 0500 (!) 168 kg (369 lb 14.9 oz)   01/22/24 0500 (!) 168 kg (371 lb 4.1 oz)   01/21/24 0500 (!) 168 kg (371 lb 4.1 oz)   01/20/24 0500 (!) 168 kg (371 lb 7.6 oz)   01/19/24 0500 (!) 171 kg (376 lb 1.7 oz)   01/18/24 0500 (!) 172 kg (380 lb 1.2 oz)   01/17/24 0614 (!) 172 kg (379 lb 6.6 oz)   01/16/24 0500 (!) 171 kg (378 lb 1.4 oz)   01/15/24 0500 (!) 169 kg (372 lb 2.2 oz)   01/14/24 0546 (!) 169 kg (373 lb 7.4 oz)   01/13/24 0507 (!) 171 kg (376 lb 5.2 oz)   01/12/24 0600 (!) 170 kg (374 lb 12.5 oz)   01/11/24 0600 (!) 169 kg (371 lb 14.7 oz)   01/10/24 0600 (!) 169 kg (371 lb 11.1 oz)   01/09/24 0500 (!) 168 kg (370 lb 9.5 oz)   01/08/24 0448 (!) 168 kg (370 lb 9.5 oz)   01/07/24 0454 (!) 167 kg (367 lb 15.2 oz)   01/06/24 0500 (!) 166 kg (366 lb 10 oz)   01/05/24 0555 (!) 165 kg (364 lb 6.7 oz)   01/04/24 0500 (!) 165 kg (363 lb 12.1 oz)   01/03/24 0500 (!) 166 kg (365 lb 1.3 oz)   01/02/24 0500 (!) 167 kg (367 lb 4.6 oz)   01/01/24 0500 (!) 166 kg (365 lb 11.9 oz)   12/31/23 0500 (!) 160 kg (352 lb 4.7 oz)   12/30/23 0500 (!) 167 kg (367 lb 15.2 oz)   12/29/23 0500  (!) 166 kg (366 lb 10 oz)   12/28/23 0500 (!) 165 kg (364 lb 13.8 oz)   12/27/23 0500 (!) 166 kg (367 lb 1.1 oz)   12/26/23 0500 (!) 167 kg (367 lb 4.6 oz)   12/25/23 0500 (!) 165 kg (364 lb 3.2 oz)   12/24/23 0500 (!) 164 kg (362 lb 7 oz)   12/23/23 0500 (!) 163 kg (360 lb 0.2 oz)   12/22/23 0600 (!) 163 kg (358 lb 14.5 oz)   12/21/23 0500 (!) 163 kg (359 lb 12.7 oz)   12/20/23 0500 (!) 162 kg (357 lb 9.4 oz)   12/19/23 0550 (!) 163 kg (359 lb 5.6 oz)   12/18/23 0500 (!) 161 kg (355 lb 13.2 oz)   12/17/23 0500 (!) 160 kg (353 lb 13.4 oz)   12/16/23 1541 (!) 160 kg (353 lb 3.2 oz)   12/16/23 0500 (!) 165 kg (364 lb 13.8 oz)   12/15/23 0500 (!) 165 kg (362 lb 10.5 oz)   12/14/23 0500 (!) 157 kg (345 lb 14.4 oz)   12/13/23 0500 (!) 153 kg (337 lb 4.9 oz)   12/12/23 0500 (!) 155 kg (341 lb 0.8 oz)   12/11/23 1049 (!) 155 kg (341 lb 0.8 oz)   12/11/23 0500 (!) 160 kg (353 lb 13.4 oz)   12/10/23 0532 (!) 162 kg (356 lb 7.7 oz)   12/09/23 0500 (!) 151 kg (332 lb 10.8 oz)   12/08/23 0500 (!) 153 kg (336 lb 13.8 oz)   12/06/23 0500 (!) 154 kg (340 lb 6.2 oz)   12/05/23 0607 (!) 161 kg (354 lb 15.1 oz)   12/04/23 0500 (!) 161 kg (354 lb 4.5 oz)   12/03/23 0400 (!) 161 kg (354 lb 11.5 oz)   11/21/23 1155 (!) 162 kg (357 lb 12.8 oz)   11/09/23 0500 (!) 160 kg (353 lb 9.9 oz)   11/06/23 1422 (!) 158 kg (348 lb 5.2 oz)   11/02/23 1155 (!) 158 kg (348 lb 15.8 oz)   09/11/23 0030 (!) 151 kg (334 lb)   09/10/23 1844 (!) 154 kg (338 lb 10 oz)   08/30/23 1112 (!) 157 kg (347 lb)   08/01/23 0932 (!) 158 kg (347 lb 10.7 oz)   07/31/23 2347 (!) 163 kg (358 lb 7.5 oz)   06/16/23 2333 (!) 155 kg (342 lb 2.5 oz)   06/16/23 1053 (!) 155 kg (342 lb 3.2 oz)   06/15/23 0505 (!) 149 kg (328 lb 14.4 oz)   06/14/23 0455 (!) 149 kg (328 lb 4.8 oz)   06/13/23 1703 (!) 149 kg (328 lb 11.2 oz)   06/13/23 0600 (!) 170 kg (374 lb 12.5 oz)   06/12/23 0420 (!) 160 kg (352 lb 11.8 oz)   06/11/23 0447 (!) 150 kg (331 lb 5.6 oz)   06/10/23 0500 (!) 150 kg  (330 lb 14.6 oz)   06/09/23 0536 (!) 156 kg (343 lb 7.6 oz)   06/08/23 1826 (!) 156 kg (344 lb 11.2 oz)   06/08/23 0522 (!) 158 kg (348 lb 8.8 oz)   06/07/23 0548 (!) 155 kg (342 lb 9.5 oz)   06/06/23 0515 (!) 155 kg (341 lb 14.9 oz)   06/05/23 0445 (!) 155 kg (341 lb 9.6 oz)   06/05/23 0348 (!) 158 kg (349 lb 3.3 oz)   06/04/23 0555 (!) 157 kg (346 lb 3.2 oz)   06/03/23 0539 (!) 158 kg (349 lb)   06/02/23 0548 (!) 163 kg (359 lb 3.2 oz)   06/01/23 0600 (!) 164 kg (362 lb)   05/31/23 0507 (!) 164 kg (362 lb 4.8 oz)   05/30/23 0635 (!) 164 kg (362 lb 7 oz)   05/29/23 0500 (!) 167 kg (368 lb 2.7 oz)   05/28/23 0600 (!) 167 kg (367 lb 11.6 oz)   05/27/23 0600 (!) 167 kg (369 lb 0.8 oz)   05/26/23 0529 (!) 167 kg (369 lb 3.2 oz)   05/25/23 0600 (!) 168 kg (370 lb 3.2 oz)   05/24/23 0600 (!) 166 kg (366 lb 9.6 oz)   05/22/23 0529 (!) 169 kg (373 lb 7.4 oz)   05/21/23 0600 (!) 169 kg (371 lb 12.8 oz)   05/20/23 0600 (!) 171 kg (376 lb 5.2 oz)   05/19/23 0300 (!) 168 kg (370 lb 14.4 oz)   05/18/23 1912 (!) 168 kg (371 lb 7.6 oz)   05/18/23 0600 (!) 169 kg (371 lb 11.1 oz)   05/16/23 0700 (!) 171 kg (377 lb 4.8 oz)   05/14/23 0500 (!) 171 kg (377 lb 3.3 oz)   05/12/23 1143 (!) 170 kg (375 lb)   05/06/23 0258 (!) 170 kg (375 lb 8 oz)   04/19/23 0909 (!) 163 kg (359 lb)   04/03/23 1906 (!) 168 kg (370 lb)   03/27/23 0938 (!) 170 kg (373 lb 10.9 oz)   03/17/23 1153 (!) 168 kg (370 lb)   01/27/23 1501 (!) 168 kg (370 lb)   12/22/22 1501 (!) 171 kg (376 lb)   11/08/22 1035 (!) 161 kg (355 lb)   10/01/22 1141 (!) 164 kg (360 lb 10.8 oz)   05/18/22 1311 (!) 155 kg (341 lb)   03/24/22 1432 (!) 155 kg (341 lb)   03/02/22 1412 (!) 155 kg (341 lb)   01/12/22 1317 (!) 155 kg (341 lb)   12/30/21 1431 (!) 155 kg (341 lb)   12/01/21 1144 (!) 155 kg (341 lb 11.4 oz)   12/01/21 0843 (!) 157 kg (346 lb)   11/22/21 0839 (!) 157 kg (346 lb)   11/15/21 1148 (!) 157 kg (346 lb 12.5 oz)   11/09/21 1139 (!) 157 kg (345 lb 7.4 oz)   11/05/21 1130  (!) 159 kg (351 lb 3.1 oz)   11/02/21 1121 (!) 161 kg (356 lb)   10/27/21 1048 (!) 161 kg (356 lb)          Wt Change Observation Stable x 1 year with fluctuations     Estimated/Assessed Needs  Estimated Needs based on: Adjusted Body Weight        Energy Requirements 25 kcal/kg    EST Needs (kcal/day) 2730 kcal       Protein Requirements 1.0-1.2 g/kg   EST Daily Needs (g/day) 109-131 g       Fluid Requirements 25 ml/kg    Estimated Needs (mL/day) 2730 ml     Labs/Medications         Pertinent Labs Reviewed.   Results from last 7 days   Lab Units 10/04/24  0448 10/03/24  0456 10/02/24  0442   SODIUM mmol/L 133* 136 137   POTASSIUM mmol/L 4.5 4.1 4.0   CHLORIDE mmol/L 99 103 105   CO2 mmol/L 26.5 24.9 26.4   BUN mg/dL 25* 21 25*   CREATININE mg/dL 0.66* 0.60* 0.62*   CALCIUM mg/dL 8.3* 8.1* 8.2*   GLUCOSE mg/dL 196* 163* 136*     Results from last 7 days   Lab Units 10/04/24  0448 09/29/24  0439 09/28/24  0458   MAGNESIUM mg/dL  --   --  2.2   PHOSPHORUS mg/dL 4.2   < > 3.6    < > = values in this interval not displayed.     COVID19   Date Value Ref Range Status   10/03/2024 Not Detected Not Detected - Ref. Range Final     Lab Results   Component Value Date    HGBA1C 4.40 (L) 08/28/2024         Pertinent Medications Reviewed.     Malnutrition Severity Assessment              Nutrition Diagnosis         Nutrition Dx Problem 1 Overweight/Obesity related to limited adherence to nutrition recommendations as evidenced by  non compliance, BMI 42.35.      Nutrition Intervention           Current Nutrition Orders & Evaluation of Intake       Current PO Diet Diet: Diabetic Diets, High Protein Diet, Cardiac; Low Sodium (2g); Texture: Regular Texture (IDDSI 7); Fluid Consistency: Thin (IDDSI 0)   Supplement Orders Placed This Encounter      DIET MESSAGE Pt is allowed 2 plant-based burgers per day Tiff Orellana RD           Nutrition Intervention/Prescription        Continue with current diet.         Medical Nutrition  Therapy/Nutrition Education          Learner     Readiness Patient  Education not indicated at this time     Method     Response N/A  N/A     Monitor/Evaluation        Monitor Per protocol, PO intake, Pertinent labs, Weight, Skin status, POC/GOC     Nutrition Discharge Plan         To be determined     Electronically signed by:  Brigitte Mcclellan RD  10/04/24 13:46 EDT

## 2024-10-04 NOTE — PLAN OF CARE
Goal Outcome Evaluation:  Plan of Care Reviewed With: patient        Progress: improving  Outcome Evaluation: Alert and oriented and pleasant with staff this shift. x3 max assist for transfers and ambulation. x2 admin PRN pain medication with relief of symptoms noted. No other significant changes noted. Sitting up in bed, call light in reach.

## 2024-10-04 NOTE — PLAN OF CARE
Goal Outcome Evaluation:  Plan of Care Reviewed With: patient           Outcome Evaluation: Pt is AO x 4 and vitals are stable  He uses the bedpan and the urinal and has not been out of bed.  He was medicated with Pepto, Ibuprofen and Percocet/Baclofen, somewhat effective. Blood sugar [id number] and he was covered by insulin. Has not been weighed and refuses to get up or to be weighed in the Cyndy lift.  Will continue with his plan of care. Call light and personal items within reach.

## 2024-10-05 LAB
ALBUMIN SERPL-MCNC: 2.7 G/DL (ref 3.5–5.2)
ANION GAP SERPL CALCULATED.3IONS-SCNC: 7.2 MMOL/L (ref 5–15)
BUN SERPL-MCNC: 27 MG/DL (ref 8–23)
BUN/CREAT SERPL: 48.2 (ref 7–25)
CALCIUM SPEC-SCNC: 8.3 MG/DL (ref 8.6–10.5)
CHLORIDE SERPL-SCNC: 101 MMOL/L (ref 98–107)
CO2 SERPL-SCNC: 24.8 MMOL/L (ref 22–29)
CREAT SERPL-MCNC: 0.56 MG/DL (ref 0.76–1.27)
EGFRCR SERPLBLD CKD-EPI 2021: 109.4 ML/MIN/1.73
GLUCOSE BLDC GLUCOMTR-MCNC: 120 MG/DL (ref 70–99)
GLUCOSE BLDC GLUCOMTR-MCNC: 151 MG/DL (ref 70–99)
GLUCOSE BLDC GLUCOMTR-MCNC: 168 MG/DL (ref 70–99)
GLUCOSE BLDC GLUCOMTR-MCNC: 206 MG/DL (ref 70–99)
GLUCOSE SERPL-MCNC: 160 MG/DL (ref 65–99)
PHOSPHATE SERPL-MCNC: 4.5 MG/DL (ref 2.5–4.5)
POTASSIUM SERPL-SCNC: 4.6 MMOL/L (ref 3.5–5.2)
SODIUM SERPL-SCNC: 133 MMOL/L (ref 136–145)
WHOLE BLOOD HOLD SPECIMEN: NORMAL

## 2024-10-05 PROCEDURE — 82948 REAGENT STRIP/BLOOD GLUCOSE: CPT

## 2024-10-05 PROCEDURE — 63710000001 INSULIN LISPRO (HUMAN) PER 5 UNITS: Performed by: INTERNAL MEDICINE

## 2024-10-05 PROCEDURE — 63710000001 INSULIN GLARGINE PER 5 UNITS: Performed by: INTERNAL MEDICINE

## 2024-10-05 PROCEDURE — 80069 RENAL FUNCTION PANEL: CPT | Performed by: INTERNAL MEDICINE

## 2024-10-05 RX ADMIN — Medication 500 MG: at 08:26

## 2024-10-05 RX ADMIN — INSULIN LISPRO 3 UNITS: 100 INJECTION, SOLUTION INTRAVENOUS; SUBCUTANEOUS at 20:17

## 2024-10-05 RX ADMIN — INSULIN LISPRO 3 UNITS: 100 INJECTION, SOLUTION INTRAVENOUS; SUBCUTANEOUS at 18:10

## 2024-10-05 RX ADMIN — APIXABAN 5 MG: 5 TABLET, FILM COATED ORAL at 20:19

## 2024-10-05 RX ADMIN — Medication 10 MG: at 20:19

## 2024-10-05 RX ADMIN — INSULIN GLARGINE 25 UNITS: 100 INJECTION, SOLUTION SUBCUTANEOUS at 08:27

## 2024-10-05 RX ADMIN — BACLOFEN 10 MG: 10 TABLET ORAL at 00:13

## 2024-10-05 RX ADMIN — FERROUS SULFATE TAB 325 MG (65 MG ELEMENTAL FE) 325 MG: 325 (65 FE) TAB at 07:53

## 2024-10-05 RX ADMIN — METOPROLOL SUCCINATE 12.5 MG: 25 TABLET, FILM COATED, EXTENDED RELEASE ORAL at 10:24

## 2024-10-05 RX ADMIN — ATORVASTATIN CALCIUM 10 MG: 10 TABLET, FILM COATED ORAL at 20:18

## 2024-10-05 RX ADMIN — PREGABALIN 25 MG: 25 CAPSULE ORAL at 15:05

## 2024-10-05 RX ADMIN — OXYCODONE AND ACETAMINOPHEN 1 TABLET: 7.5; 325 TABLET ORAL at 00:13

## 2024-10-05 RX ADMIN — DICLOFENAC SODIUM 4 G: 10 GEL TOPICAL at 15:00

## 2024-10-05 RX ADMIN — ACETAMINOPHEN 650 MG: 325 TABLET ORAL at 10:31

## 2024-10-05 RX ADMIN — APIXABAN 5 MG: 5 TABLET, FILM COATED ORAL at 08:26

## 2024-10-05 RX ADMIN — CYANOCOBALAMIN TAB 500 MCG 1000 MCG: 500 TAB at 08:25

## 2024-10-05 RX ADMIN — OXYCODONE AND ACETAMINOPHEN 1 TABLET: 7.5; 325 TABLET ORAL at 20:18

## 2024-10-05 RX ADMIN — INSULIN LISPRO 5 UNITS: 100 INJECTION, SOLUTION INTRAVENOUS; SUBCUTANEOUS at 12:19

## 2024-10-05 RX ADMIN — DIGOXIN 125 MCG: 125 TABLET ORAL at 12:19

## 2024-10-05 RX ADMIN — INSULIN GLARGINE 22 UNITS: 100 INJECTION, SOLUTION SUBCUTANEOUS at 20:17

## 2024-10-05 RX ADMIN — BACLOFEN 10 MG: 10 TABLET ORAL at 20:19

## 2024-10-05 RX ADMIN — CETIRIZINE HYDROCHLORIDE 10 MG: 10 TABLET, FILM COATED ORAL at 08:26

## 2024-10-05 RX ADMIN — EMPAGLIFLOZIN 10 MG: 10 TABLET, FILM COATED ORAL at 08:26

## 2024-10-05 RX ADMIN — OXYCODONE AND ACETAMINOPHEN 1 TABLET: 7.5; 325 TABLET ORAL at 08:25

## 2024-10-05 RX ADMIN — HYDROCORTISONE ACETATE 1 APPLICATION: 1 CREAM TOPICAL at 10:27

## 2024-10-05 RX ADMIN — CHOLESTYRAMINE 4 G: 4 POWDER, FOR SUSPENSION ORAL at 08:27

## 2024-10-05 RX ADMIN — IBUPROFEN 400 MG: 400 TABLET ORAL at 15:05

## 2024-10-05 RX ADMIN — DICLOFENAC SODIUM 4 G: 10 GEL TOPICAL at 07:53

## 2024-10-05 RX ADMIN — SERTRALINE HYDROCHLORIDE 50 MG: 50 TABLET ORAL at 20:18

## 2024-10-05 RX ADMIN — Medication 500 MG: at 20:18

## 2024-10-05 RX ADMIN — SPIRONOLACTONE 25 MG: 25 TABLET ORAL at 10:25

## 2024-10-05 RX ADMIN — PREGABALIN 25 MG: 25 CAPSULE ORAL at 08:26

## 2024-10-05 RX ADMIN — BISMUTH SUBSALICYLATE 524 MG: 262 TABLET, CHEWABLE ORAL at 10:26

## 2024-10-05 RX ADMIN — LISINOPRIL 2.5 MG: 2.5 TABLET ORAL at 10:25

## 2024-10-05 RX ADMIN — BACLOFEN 10 MG: 10 TABLET ORAL at 08:25

## 2024-10-05 RX ADMIN — MONTELUKAST 10 MG: 10 TABLET, FILM COATED ORAL at 20:18

## 2024-10-05 RX ADMIN — BUMETANIDE 2 MG: 1 TABLET ORAL at 10:23

## 2024-10-05 RX ADMIN — DICLOFENAC SODIUM 4 G: 10 GEL TOPICAL at 03:41

## 2024-10-05 RX ADMIN — PREGABALIN 25 MG: 25 CAPSULE ORAL at 20:18

## 2024-10-05 NOTE — PLAN OF CARE
Goal Outcome Evaluation:           Progress: improving  Outcome Evaluation: No significant change. Continues with Oxycodone and Baclofen for pain management. Refuses turning and repositing. Uses trapeze to reposition self. Continue plan of care.

## 2024-10-06 LAB
ALBUMIN SERPL-MCNC: 2.6 G/DL (ref 3.5–5.2)
ANION GAP SERPL CALCULATED.3IONS-SCNC: 10.3 MMOL/L (ref 5–15)
BUN SERPL-MCNC: 28 MG/DL (ref 8–23)
BUN/CREAT SERPL: 45.2 (ref 7–25)
CALCIUM SPEC-SCNC: 7.9 MG/DL (ref 8.6–10.5)
CHLORIDE SERPL-SCNC: 100 MMOL/L (ref 98–107)
CO2 SERPL-SCNC: 23.7 MMOL/L (ref 22–29)
CREAT SERPL-MCNC: 0.62 MG/DL (ref 0.76–1.27)
EGFRCR SERPLBLD CKD-EPI 2021: 106.1 ML/MIN/1.73
GLUCOSE BLDC GLUCOMTR-MCNC: 129 MG/DL (ref 70–99)
GLUCOSE BLDC GLUCOMTR-MCNC: 132 MG/DL (ref 70–99)
GLUCOSE BLDC GLUCOMTR-MCNC: 145 MG/DL (ref 70–99)
GLUCOSE BLDC GLUCOMTR-MCNC: 343 MG/DL (ref 70–99)
GLUCOSE SERPL-MCNC: 184 MG/DL (ref 65–99)
PHOSPHATE SERPL-MCNC: 4.5 MG/DL (ref 2.5–4.5)
POTASSIUM SERPL-SCNC: 4.6 MMOL/L (ref 3.5–5.2)
SODIUM SERPL-SCNC: 134 MMOL/L (ref 136–145)

## 2024-10-06 PROCEDURE — 80069 RENAL FUNCTION PANEL: CPT | Performed by: INTERNAL MEDICINE

## 2024-10-06 PROCEDURE — 63710000001 ONDANSETRON ODT 4 MG TABLET DISPERSIBLE: Performed by: INTERNAL MEDICINE

## 2024-10-06 PROCEDURE — 63710000001 INSULIN LISPRO (HUMAN) PER 5 UNITS: Performed by: INTERNAL MEDICINE

## 2024-10-06 PROCEDURE — 63710000001 INSULIN GLARGINE PER 5 UNITS: Performed by: INTERNAL MEDICINE

## 2024-10-06 PROCEDURE — 82948 REAGENT STRIP/BLOOD GLUCOSE: CPT

## 2024-10-06 RX ADMIN — OXYCODONE AND ACETAMINOPHEN 1 TABLET: 7.5; 325 TABLET ORAL at 12:21

## 2024-10-06 RX ADMIN — INSULIN LISPRO 10 UNITS: 100 INJECTION, SOLUTION INTRAVENOUS; SUBCUTANEOUS at 20:46

## 2024-10-06 RX ADMIN — SPIRONOLACTONE 25 MG: 25 TABLET ORAL at 10:27

## 2024-10-06 RX ADMIN — BISMUTH SUBSALICYLATE 524 MG: 262 TABLET, CHEWABLE ORAL at 23:16

## 2024-10-06 RX ADMIN — PREGABALIN 25 MG: 25 CAPSULE ORAL at 16:02

## 2024-10-06 RX ADMIN — SERTRALINE HYDROCHLORIDE 50 MG: 50 TABLET ORAL at 20:47

## 2024-10-06 RX ADMIN — OXYCODONE AND ACETAMINOPHEN 1 TABLET: 7.5; 325 TABLET ORAL at 20:47

## 2024-10-06 RX ADMIN — DIGOXIN 125 MCG: 125 TABLET ORAL at 12:21

## 2024-10-06 RX ADMIN — APIXABAN 5 MG: 5 TABLET, FILM COATED ORAL at 20:46

## 2024-10-06 RX ADMIN — Medication 500 MG: at 20:46

## 2024-10-06 RX ADMIN — DICLOFENAC SODIUM 4 G: 10 GEL TOPICAL at 20:48

## 2024-10-06 RX ADMIN — BACLOFEN 10 MG: 10 TABLET ORAL at 04:12

## 2024-10-06 RX ADMIN — ONDANSETRON 4 MG: 4 TABLET, ORALLY DISINTEGRATING ORAL at 12:27

## 2024-10-06 RX ADMIN — FERROUS SULFATE TAB 325 MG (65 MG ELEMENTAL FE) 325 MG: 325 (65 FE) TAB at 09:16

## 2024-10-06 RX ADMIN — Medication 10 MG: at 20:47

## 2024-10-06 RX ADMIN — BUMETANIDE 2 MG: 1 TABLET ORAL at 10:27

## 2024-10-06 RX ADMIN — PREGABALIN 25 MG: 25 CAPSULE ORAL at 09:16

## 2024-10-06 RX ADMIN — CHOLESTYRAMINE 4 G: 4 POWDER, FOR SUSPENSION ORAL at 09:15

## 2024-10-06 RX ADMIN — SPIRONOLACTONE 25 MG: 25 TABLET ORAL at 17:57

## 2024-10-06 RX ADMIN — OXYCODONE AND ACETAMINOPHEN 1 TABLET: 7.5; 325 TABLET ORAL at 04:12

## 2024-10-06 RX ADMIN — Medication 500 MG: at 09:15

## 2024-10-06 RX ADMIN — EMPAGLIFLOZIN 10 MG: 10 TABLET, FILM COATED ORAL at 09:16

## 2024-10-06 RX ADMIN — ATORVASTATIN CALCIUM 10 MG: 10 TABLET, FILM COATED ORAL at 20:47

## 2024-10-06 RX ADMIN — PREGABALIN 25 MG: 25 CAPSULE ORAL at 20:46

## 2024-10-06 RX ADMIN — BACLOFEN 10 MG: 10 TABLET ORAL at 20:47

## 2024-10-06 RX ADMIN — INSULIN GLARGINE 25 UNITS: 100 INJECTION, SOLUTION SUBCUTANEOUS at 09:15

## 2024-10-06 RX ADMIN — CYANOCOBALAMIN TAB 500 MCG 1000 MCG: 500 TAB at 09:15

## 2024-10-06 RX ADMIN — CETIRIZINE HYDROCHLORIDE 10 MG: 10 TABLET, FILM COATED ORAL at 09:16

## 2024-10-06 RX ADMIN — APIXABAN 5 MG: 5 TABLET, FILM COATED ORAL at 09:16

## 2024-10-06 RX ADMIN — MONTELUKAST 10 MG: 10 TABLET, FILM COATED ORAL at 20:47

## 2024-10-06 RX ADMIN — DICLOFENAC SODIUM 4 G: 10 GEL TOPICAL at 01:51

## 2024-10-06 RX ADMIN — LISINOPRIL 2.5 MG: 2.5 TABLET ORAL at 10:27

## 2024-10-06 RX ADMIN — INSULIN GLARGINE 22 UNITS: 100 INJECTION, SOLUTION SUBCUTANEOUS at 20:45

## 2024-10-06 RX ADMIN — DICLOFENAC SODIUM 4 G: 10 GEL TOPICAL at 09:15

## 2024-10-06 RX ADMIN — BACLOFEN 10 MG: 10 TABLET ORAL at 12:21

## 2024-10-06 RX ADMIN — IBUPROFEN 400 MG: 400 TABLET ORAL at 02:55

## 2024-10-06 NOTE — PLAN OF CARE
Goal Outcome Evaluation:  Plan of Care Reviewed With: patient        Progress: improving  Outcome Evaluation: No significant change. PRN percocet Percocet and Baclofen administered X 2. Motrin administered X 1 for left hip pain. Eating door dash meal at shift change. Continues to use trapeze to reposition self and refuses repositioning from staff.

## 2024-10-06 NOTE — PLAN OF CARE
Goal Outcome Evaluation:           Progress: no change  Outcome Evaluation: Patient is alert and oriented. Is non-compliant with diet orders and again ordered door dash meal. Refused wound care to abdomen but allowed BLE wound care by RN. Refused bath and oral care for nurse. Given PRN Baclofen and Percocet at 0825, Tylenol at 1031, Pepto bismol at 1026, Motrin at 1505 per request. Meds all effective. Voices needs. Con't current POC.

## 2024-10-06 NOTE — PLAN OF CARE
"Goal Outcome Evaluation:  Plan of Care Reviewed With: patient        Progress: no change  Outcome Evaluation: Aox4, call light and personal items within reach. Able to make needs known with staffing. 1x to the bedpan, urinal at bedside. C/o pain, medication given, PA notified, orders changed. Door dashed snacks from the [employer] and a meal from [employer]. Con't blood sugar checks. Rsd stated the PCA is a \"Certified ass wiper\". Con't plan of care.                               "

## 2024-10-07 LAB
GLUCOSE BLDC GLUCOMTR-MCNC: 156 MG/DL (ref 70–99)
GLUCOSE BLDC GLUCOMTR-MCNC: 159 MG/DL (ref 70–99)
GLUCOSE BLDC GLUCOMTR-MCNC: 172 MG/DL (ref 70–99)
GLUCOSE BLDC GLUCOMTR-MCNC: 310 MG/DL (ref 70–99)
GLUCOSE BLDC GLUCOMTR-MCNC: 477 MG/DL (ref 70–99)
SARS-COV-2 RNA RESP QL NAA+PROBE: NOT DETECTED

## 2024-10-07 PROCEDURE — 82948 REAGENT STRIP/BLOOD GLUCOSE: CPT

## 2024-10-07 PROCEDURE — 63710000001 INSULIN LISPRO (HUMAN) PER 5 UNITS: Performed by: INTERNAL MEDICINE

## 2024-10-07 PROCEDURE — 63710000001 INSULIN GLARGINE PER 5 UNITS: Performed by: INTERNAL MEDICINE

## 2024-10-07 PROCEDURE — 87635 SARS-COV-2 COVID-19 AMP PRB: CPT | Performed by: PHYSICIAN ASSISTANT

## 2024-10-07 RX ADMIN — DICLOFENAC SODIUM 4 G: 10 GEL TOPICAL at 21:22

## 2024-10-07 RX ADMIN — Medication 500 MG: at 09:59

## 2024-10-07 RX ADMIN — BACLOFEN 10 MG: 10 TABLET ORAL at 12:37

## 2024-10-07 RX ADMIN — Medication 10 MG: at 21:22

## 2024-10-07 RX ADMIN — INSULIN LISPRO 3 UNITS: 100 INJECTION, SOLUTION INTRAVENOUS; SUBCUTANEOUS at 17:44

## 2024-10-07 RX ADMIN — FERROUS SULFATE TAB 325 MG (65 MG ELEMENTAL FE) 325 MG: 325 (65 FE) TAB at 08:02

## 2024-10-07 RX ADMIN — Medication 500 MG: at 21:23

## 2024-10-07 RX ADMIN — PREGABALIN 25 MG: 25 CAPSULE ORAL at 21:22

## 2024-10-07 RX ADMIN — INSULIN GLARGINE 25 UNITS: 100 INJECTION, SOLUTION SUBCUTANEOUS at 09:59

## 2024-10-07 RX ADMIN — INSULIN LISPRO 3 UNITS: 100 INJECTION, SOLUTION INTRAVENOUS; SUBCUTANEOUS at 08:02

## 2024-10-07 RX ADMIN — OXYCODONE AND ACETAMINOPHEN 1 TABLET: 7.5; 325 TABLET ORAL at 04:34

## 2024-10-07 RX ADMIN — DIGOXIN 125 MCG: 125 TABLET ORAL at 12:37

## 2024-10-07 RX ADMIN — INSULIN LISPRO 10 UNITS: 100 INJECTION, SOLUTION INTRAVENOUS; SUBCUTANEOUS at 21:22

## 2024-10-07 RX ADMIN — SPIRONOLACTONE 25 MG: 25 TABLET ORAL at 17:44

## 2024-10-07 RX ADMIN — BUMETANIDE 2 MG: 1 TABLET ORAL at 09:59

## 2024-10-07 RX ADMIN — SPIRONOLACTONE 25 MG: 25 TABLET ORAL at 09:59

## 2024-10-07 RX ADMIN — CYANOCOBALAMIN TAB 500 MCG 1000 MCG: 500 TAB at 09:59

## 2024-10-07 RX ADMIN — OXYCODONE AND ACETAMINOPHEN 1 TABLET: 7.5; 325 TABLET ORAL at 12:37

## 2024-10-07 RX ADMIN — IBUPROFEN 400 MG: 400 TABLET ORAL at 02:47

## 2024-10-07 RX ADMIN — PREGABALIN 25 MG: 25 CAPSULE ORAL at 16:22

## 2024-10-07 RX ADMIN — ATORVASTATIN CALCIUM 10 MG: 10 TABLET, FILM COATED ORAL at 21:22

## 2024-10-07 RX ADMIN — SERTRALINE HYDROCHLORIDE 50 MG: 50 TABLET ORAL at 21:22

## 2024-10-07 RX ADMIN — CETIRIZINE HYDROCHLORIDE 10 MG: 10 TABLET, FILM COATED ORAL at 09:59

## 2024-10-07 RX ADMIN — INSULIN LISPRO 3 UNITS: 100 INJECTION, SOLUTION INTRAVENOUS; SUBCUTANEOUS at 12:01

## 2024-10-07 RX ADMIN — BACLOFEN 10 MG: 10 TABLET ORAL at 04:34

## 2024-10-07 RX ADMIN — APIXABAN 5 MG: 5 TABLET, FILM COATED ORAL at 21:22

## 2024-10-07 RX ADMIN — APIXABAN 5 MG: 5 TABLET, FILM COATED ORAL at 09:59

## 2024-10-07 RX ADMIN — DICLOFENAC SODIUM 4 G: 10 GEL TOPICAL at 08:03

## 2024-10-07 RX ADMIN — MONTELUKAST 10 MG: 10 TABLET, FILM COATED ORAL at 21:22

## 2024-10-07 RX ADMIN — PREGABALIN 25 MG: 25 CAPSULE ORAL at 09:59

## 2024-10-07 RX ADMIN — EMPAGLIFLOZIN 10 MG: 10 TABLET, FILM COATED ORAL at 09:59

## 2024-10-07 RX ADMIN — INSULIN GLARGINE 22 UNITS: 100 INJECTION, SOLUTION SUBCUTANEOUS at 21:22

## 2024-10-07 NOTE — PLAN OF CARE
"Goal Outcome Evaluation:  Plan of Care Reviewed With: patient        Progress: no change  Outcome Evaluation: Alert and oriented x 4. Blood glucose 343 at HS. Insulin administered as ordered. Resident had snack of chips and cheese dip that had been doordashed. Continues with demands and foul language. Later asked for a turkey sandwich. Counseled about blood glucose. Patient stated that he didn't give a damn and just wanted a turkey sandwich, which he ate with small package of spam. Also stated staff had not spent enough in his room and that he need to \"shit\" so staff would be in there more.                               "

## 2024-10-07 NOTE — PLAN OF CARE
Goal Outcome Evaluation:  Plan of Care Reviewed With: patient        Progress: improving  Outcome Evaluation: Alert and oriented and pleasant with staff. x2 max assist for transfers and ambulation. x1 admin PRN pain medication, with releif of symptoms noted. No other significant changes noted this shift. Sitting up in bed, call light in reach.

## 2024-10-08 LAB
GLUCOSE BLDC GLUCOMTR-MCNC: 113 MG/DL (ref 70–99)
GLUCOSE BLDC GLUCOMTR-MCNC: 137 MG/DL (ref 70–99)
GLUCOSE BLDC GLUCOMTR-MCNC: 163 MG/DL (ref 70–99)
GLUCOSE BLDC GLUCOMTR-MCNC: 287 MG/DL (ref 70–99)

## 2024-10-08 PROCEDURE — 63710000001 INSULIN GLARGINE PER 5 UNITS: Performed by: INTERNAL MEDICINE

## 2024-10-08 PROCEDURE — 82948 REAGENT STRIP/BLOOD GLUCOSE: CPT

## 2024-10-08 PROCEDURE — 63710000001 INSULIN LISPRO (HUMAN) PER 5 UNITS: Performed by: INTERNAL MEDICINE

## 2024-10-08 RX ADMIN — INSULIN LISPRO 8 UNITS: 100 INJECTION, SOLUTION INTRAVENOUS; SUBCUTANEOUS at 20:34

## 2024-10-08 RX ADMIN — BACLOFEN 10 MG: 10 TABLET ORAL at 00:42

## 2024-10-08 RX ADMIN — OXYCODONE AND ACETAMINOPHEN 1 TABLET: 7.5; 325 TABLET ORAL at 09:02

## 2024-10-08 RX ADMIN — PREGABALIN 25 MG: 25 CAPSULE ORAL at 18:32

## 2024-10-08 RX ADMIN — IBUPROFEN 400 MG: 400 TABLET ORAL at 06:12

## 2024-10-08 RX ADMIN — BACLOFEN 10 MG: 10 TABLET ORAL at 09:01

## 2024-10-08 RX ADMIN — INSULIN LISPRO 3 UNITS: 100 INJECTION, SOLUTION INTRAVENOUS; SUBCUTANEOUS at 18:33

## 2024-10-08 RX ADMIN — SERTRALINE HYDROCHLORIDE 50 MG: 50 TABLET ORAL at 20:33

## 2024-10-08 RX ADMIN — INSULIN GLARGINE 22 UNITS: 100 INJECTION, SOLUTION SUBCUTANEOUS at 20:34

## 2024-10-08 RX ADMIN — BISMUTH SUBSALICYLATE 524 MG: 262 TABLET, CHEWABLE ORAL at 04:01

## 2024-10-08 RX ADMIN — MONTELUKAST 10 MG: 10 TABLET, FILM COATED ORAL at 20:33

## 2024-10-08 RX ADMIN — LISINOPRIL 2.5 MG: 2.5 TABLET ORAL at 09:01

## 2024-10-08 RX ADMIN — TUBERCULIN PURIFIED PROTEIN DERIVATIVE 5 UNITS: 5 INJECTION, SOLUTION INTRADERMAL at 20:47

## 2024-10-08 RX ADMIN — FERROUS SULFATE TAB 325 MG (65 MG ELEMENTAL FE) 325 MG: 325 (65 FE) TAB at 09:01

## 2024-10-08 RX ADMIN — CYANOCOBALAMIN TAB 500 MCG 1000 MCG: 500 TAB at 09:01

## 2024-10-08 RX ADMIN — METOPROLOL SUCCINATE 12.5 MG: 25 TABLET, FILM COATED, EXTENDED RELEASE ORAL at 09:02

## 2024-10-08 RX ADMIN — DICLOFENAC SODIUM 4 G: 10 GEL TOPICAL at 14:30

## 2024-10-08 RX ADMIN — APIXABAN 5 MG: 5 TABLET, FILM COATED ORAL at 09:02

## 2024-10-08 RX ADMIN — Medication 10 MG: at 20:33

## 2024-10-08 RX ADMIN — BUMETANIDE 2 MG: 1 TABLET ORAL at 09:02

## 2024-10-08 RX ADMIN — DICLOFENAC SODIUM 4 G: 10 GEL TOPICAL at 02:23

## 2024-10-08 RX ADMIN — SPIRONOLACTONE 25 MG: 25 TABLET ORAL at 09:02

## 2024-10-08 RX ADMIN — DIGOXIN 125 MCG: 125 TABLET ORAL at 14:30

## 2024-10-08 RX ADMIN — PREGABALIN 25 MG: 25 CAPSULE ORAL at 20:32

## 2024-10-08 RX ADMIN — DICLOFENAC SODIUM 4 G: 10 GEL TOPICAL at 20:31

## 2024-10-08 RX ADMIN — Medication 500 MG: at 20:32

## 2024-10-08 RX ADMIN — EMPAGLIFLOZIN 10 MG: 10 TABLET, FILM COATED ORAL at 09:02

## 2024-10-08 RX ADMIN — OXYCODONE AND ACETAMINOPHEN 1 TABLET: 7.5; 325 TABLET ORAL at 18:32

## 2024-10-08 RX ADMIN — BACLOFEN 10 MG: 10 TABLET ORAL at 18:32

## 2024-10-08 RX ADMIN — OXYCODONE AND ACETAMINOPHEN 1 TABLET: 7.5; 325 TABLET ORAL at 00:42

## 2024-10-08 RX ADMIN — ATORVASTATIN CALCIUM 10 MG: 10 TABLET, FILM COATED ORAL at 20:36

## 2024-10-08 RX ADMIN — BISMUTH SUBSALICYLATE 524 MG: 262 TABLET, CHEWABLE ORAL at 14:30

## 2024-10-08 RX ADMIN — DICLOFENAC SODIUM 4 G: 10 GEL TOPICAL at 09:03

## 2024-10-08 RX ADMIN — SPIRONOLACTONE 25 MG: 25 TABLET ORAL at 18:32

## 2024-10-08 RX ADMIN — INSULIN GLARGINE 25 UNITS: 100 INJECTION, SOLUTION SUBCUTANEOUS at 09:00

## 2024-10-08 RX ADMIN — PREGABALIN 25 MG: 25 CAPSULE ORAL at 09:02

## 2024-10-08 RX ADMIN — Medication 500 MG: at 09:01

## 2024-10-08 RX ADMIN — APIXABAN 5 MG: 5 TABLET, FILM COATED ORAL at 20:33

## 2024-10-08 RX ADMIN — CETIRIZINE HYDROCHLORIDE 10 MG: 10 TABLET, FILM COATED ORAL at 09:02

## 2024-10-08 NOTE — PLAN OF CARE
Goal Outcome Evaluation:           Progress: no change     Rsd. Is alert and oriented x4, able to make needs known to staff.  Rsd. Has rested intermittently in between care.  Medicated x1 with prn baclofen, and oxycodone.  See emar.  Rsd. States medication effective.  Scheduled voltaren gel administered this shift.  Rsd. C/o upset stomach earlier in shift and prn pepto bismol given x1, see emar.  Rsd. Total assist while turning in bed.  Incontinent of bowel and bladder at times.  Urinal remains at bedside.  Rsd. Refuses to get out of bed this shift, or to get out of bed to obtain daily weight.  Rsd. Door dashed bologna, mccalisters, and 2 bags of candy this shift.  Rsd. Continues to refuse to turn, but does assist with shifting weight while in bed with trapeze bar.  Call light and personal items within reach.  Rsd. Reminded to use call light for assistance, verbalizes understanding.  Staff continues to educate on diabetic diet, and skin care regimen, and turning every 2 hours while in bed to reduce risk of pressure injury.  Rsd. Verbalizes understanding but continues to refuse.  Emotional support given at this time, will continue to educate.  Current plan of care remains in place at this time.  Will notify on-coming staff.

## 2024-10-09 LAB
GLUCOSE BLDC GLUCOMTR-MCNC: 102 MG/DL (ref 70–99)
GLUCOSE BLDC GLUCOMTR-MCNC: 167 MG/DL (ref 70–99)
GLUCOSE BLDC GLUCOMTR-MCNC: 242 MG/DL (ref 70–99)
GLUCOSE BLDC GLUCOMTR-MCNC: 249 MG/DL (ref 70–99)

## 2024-10-09 PROCEDURE — 82948 REAGENT STRIP/BLOOD GLUCOSE: CPT

## 2024-10-09 PROCEDURE — 97161 PT EVAL LOW COMPLEX 20 MIN: CPT

## 2024-10-09 PROCEDURE — 97167 OT EVAL HIGH COMPLEX 60 MIN: CPT

## 2024-10-09 PROCEDURE — 63710000001 INSULIN GLARGINE PER 5 UNITS: Performed by: INTERNAL MEDICINE

## 2024-10-09 PROCEDURE — 63710000001 INSULIN LISPRO (HUMAN) PER 5 UNITS: Performed by: INTERNAL MEDICINE

## 2024-10-09 RX ADMIN — PREGABALIN 25 MG: 25 CAPSULE ORAL at 15:24

## 2024-10-09 RX ADMIN — INSULIN LISPRO 3 UNITS: 100 INJECTION, SOLUTION INTRAVENOUS; SUBCUTANEOUS at 12:15

## 2024-10-09 RX ADMIN — BACLOFEN 10 MG: 10 TABLET ORAL at 11:29

## 2024-10-09 RX ADMIN — CYANOCOBALAMIN TAB 500 MCG 1000 MCG: 500 TAB at 09:08

## 2024-10-09 RX ADMIN — SPIRONOLACTONE 25 MG: 25 TABLET ORAL at 17:42

## 2024-10-09 RX ADMIN — APIXABAN 5 MG: 5 TABLET, FILM COATED ORAL at 09:08

## 2024-10-09 RX ADMIN — Medication 500 MG: at 09:08

## 2024-10-09 RX ADMIN — OXYCODONE AND ACETAMINOPHEN 1 TABLET: 7.5; 325 TABLET ORAL at 20:52

## 2024-10-09 RX ADMIN — INSULIN LISPRO 5 UNITS: 100 INJECTION, SOLUTION INTRAVENOUS; SUBCUTANEOUS at 17:42

## 2024-10-09 RX ADMIN — INSULIN GLARGINE 25 UNITS: 100 INJECTION, SOLUTION SUBCUTANEOUS at 09:08

## 2024-10-09 RX ADMIN — Medication 500 MG: at 20:52

## 2024-10-09 RX ADMIN — DICLOFENAC SODIUM 4 G: 10 GEL TOPICAL at 09:08

## 2024-10-09 RX ADMIN — INSULIN LISPRO 5 UNITS: 100 INJECTION, SOLUTION INTRAVENOUS; SUBCUTANEOUS at 20:53

## 2024-10-09 RX ADMIN — BUMETANIDE 2 MG: 1 TABLET ORAL at 10:00

## 2024-10-09 RX ADMIN — OXYCODONE AND ACETAMINOPHEN 1 TABLET: 7.5; 325 TABLET ORAL at 11:29

## 2024-10-09 RX ADMIN — BACLOFEN 10 MG: 10 TABLET ORAL at 02:17

## 2024-10-09 RX ADMIN — EMPAGLIFLOZIN 10 MG: 10 TABLET, FILM COATED ORAL at 09:09

## 2024-10-09 RX ADMIN — PREGABALIN 25 MG: 25 CAPSULE ORAL at 20:59

## 2024-10-09 RX ADMIN — SERTRALINE HYDROCHLORIDE 50 MG: 50 TABLET ORAL at 20:53

## 2024-10-09 RX ADMIN — FERROUS SULFATE TAB 325 MG (65 MG ELEMENTAL FE) 325 MG: 325 (65 FE) TAB at 09:09

## 2024-10-09 RX ADMIN — INSULIN GLARGINE 22 UNITS: 100 INJECTION, SOLUTION SUBCUTANEOUS at 20:54

## 2024-10-09 RX ADMIN — Medication 10 MG: at 20:51

## 2024-10-09 RX ADMIN — PREGABALIN 25 MG: 25 CAPSULE ORAL at 09:09

## 2024-10-09 RX ADMIN — DIGOXIN 125 MCG: 125 TABLET ORAL at 12:15

## 2024-10-09 RX ADMIN — APIXABAN 5 MG: 5 TABLET, FILM COATED ORAL at 20:52

## 2024-10-09 RX ADMIN — OXYCODONE AND ACETAMINOPHEN 1 TABLET: 7.5; 325 TABLET ORAL at 02:18

## 2024-10-09 RX ADMIN — SPIRONOLACTONE 25 MG: 25 TABLET ORAL at 09:08

## 2024-10-09 RX ADMIN — MONTELUKAST 10 MG: 10 TABLET, FILM COATED ORAL at 20:53

## 2024-10-09 RX ADMIN — BACLOFEN 10 MG: 10 TABLET ORAL at 20:53

## 2024-10-09 RX ADMIN — CETIRIZINE HYDROCHLORIDE 10 MG: 10 TABLET, FILM COATED ORAL at 09:09

## 2024-10-09 RX ADMIN — ATORVASTATIN CALCIUM 10 MG: 10 TABLET, FILM COATED ORAL at 20:53

## 2024-10-09 NOTE — PLAN OF CARE
Goal Outcome Evaluation:  Plan of Care Reviewed With: patient        Progress: no change  Outcome Evaluation: Resident alert, oriented, able to make needs known to staff. noncompliant with diet. poured Queso cheese over his eggs for breakfast and door dashed slim chicken in the afternoon; fried pickles, fried mushrooms, Mac and cheese, fried chicken strips. wt today showed resident gained 10.8 lbs in one week. resident asked to ask MD if he could have IV Bumex again instead of po. States he had better urine output with IV Bumex. Message relayed to MD. ROBISON. Blood glucose elevated for all 3 meals, covered with sliding scale. Will continue POC.

## 2024-10-09 NOTE — PLAN OF CARE
Goal Outcome Evaluation:  Plan of Care Reviewed With: patient           Outcome Evaluation: Pt has had no significant changes in status since the last evaluation.  His potential for rehab is poor and he does not meet criteria for skilled intervention at this time.  Will d/c PT services at this time.      Anticipated Discharge Disposition (PT): extended care facility

## 2024-10-09 NOTE — PLAN OF CARE
Goal Outcome Evaluation:  Plan of Care Reviewed With: patient           Outcome Evaluation: No significant changes noted. VSS. He is a 1 assist for the bedpan and max assist of 2 for transfers. Medicated pt for pain x 1, pain somewhat relieved. Blood sugar [id number], insulin coverage. Will continue with his POC. Call light and personal items within reach.

## 2024-10-09 NOTE — THERAPY EVALUATION
Acute Care - Physical Therapy Initial Evaluation  [facility]     Patient Name: Jose Shaikh  : 1960  MRN: [id number]  Today's Date: 10/9/2024     Admit date: 10/1/2024     Referring Physician: Shekhar Au MD     Surgery Date:* No surgery found *             Visit Dx:     ICD-10-CM ICD-9-CM   1. Difficulty in walking  R26.2 719.7     Patient Active Problem List   Diagnosis    Diabetic ulcer of left heel associated with type 2 DM    Acute osteomyelitis of left calcaneus     Diabetic ulcer of left heel associated with type 2 DM    Diabetic ulcer of right midfoot associated with type 2 DM    Paroxysmal atrial fibrillation    Essential hypertension    Hyperlipidemia LDL goal <100    Cellulitis and abscess of foot    High alkaline phosphatase level    Osteomyelitis    Onychomycosis    Onychocryptosis    Foot pain, bilateral    Osteomyelitis of foot, right, acute    Cellulitis of right foot    Type 2 diabetes mellitus, with long-term current use of insulin    Class 3 severe obesity due to excess calories with serious comorbidity and body mass index (BMI) of 45.0 to 49.9 in adult    Anxiety disorder, unspecified    Claustrophobia    Dependence on wheelchair    Depression, unspecified    Long term (current) use of anticoagulants    Long term (current) use of oral hypoglycemic drugs    Wound of foot    Ulcer of right foot    Orthostatic hypotension    Other chronic osteomyelitis, right ankle and foot    Personal history of nicotine dependence    Thrombocytopenia, unspecified    Unspecified open wound, right foot, initial encounter    Diabetic foot infection    Subacute osteomyelitis of right foot    Right foot pain    Sepsis    Onychomycosis    Foot pain, left    Impaired mobility and ADLs    Absence of toe of right foot    Corns and callosity    Disability of walking    Fracture    Limb swelling    Polyneuropathy    Pressure ulcer, stage 1    Shortness of breath    Generalized weakness    Debility     Onychomycosis    Noncompliance of patient with dietary regimen    Morbid obesity     Past Medical History:   Diagnosis Date    Absence of toe of right foot     Acute osteomyelitis of left calcaneus  08/18/2021    Anxiety and depression     Arthritis     Cancer     Chronic pain     STATES HIS PAIN IS 10/10 AAT    Claustrophobia     Corns and callus     Diabetic ulcer of left heel associated with type 2 DM 08/18/2021    Diabetic ulcer of left heel associated with type 2 DM 07/06/2021    Diabetic ulcer of right midfoot associated with type 2 DM 08/18/2021    Difficulty walking     Essential hypertension 08/31/2021    Hammertoe     Hyperlipidemia LDL goal <100 08/31/2021    Ingrown toenail     Obesity     Paroxysmal atrial fibrillation 08/31/2021    Polyneuropathy     Pressure ulcer, stage 1     Tinea unguium     Type 2 diabetes mellitus with polyneuropathy      Past Surgical History:   Procedure Laterality Date    CYST REMOVAL      center of back; benign    EYE SURGERY      INCISION AND DRAINAGE ABSCESS      back    INCISION AND DRAINAGE LEG Right 12/10/2021    Procedure: INCISION AND DRAINAGE LOWER EXTREMITY;  Surgeon: Ash Leyva DPM;  Location: [facility];  Service: Podiatry;  Laterality: Right;    OTHER SURGICAL HISTORY      Surgical clips left foot    TOE SURGERY Right     Removal of 5th toe    TRANS METATARSAL AMPUTATION Right 12/02/2021    Procedure: AMPUTATION TRANS METATARSAL;  Surgeon: Ash Leyva DPM;  Location: [facility] OR;  Service: Podiatry;  Laterality: Right;    VASCULAR SURGERY      WRIST SURGERY Left     repair of injury     PT Assessment (Last 12 Hours)       PT Evaluation and Treatment       Row Name 10/09/24 1000          Physical Therapy Time and Intention    Subjective Information complains of;pain  -MOE     Document Type evaluation  -MOE     Mode of Treatment individual therapy;physical therapy  -MOE     Patient Effort good  -MOE       Row Name 10/09/24 1000           General Information    Patient Observations alert;cooperative;agree to therapy  -MOE     Prior Level of Function --  See previous evaluation documentation.  Pt has been a resident of the nursing facility.  -MOE     Barriers to Rehab previous functional deficit  -MOE       Row Name 10/09/24 1000          Range of Motion (ROM)    Range of Motion --  significant hip and knee contractures Pt with bilateral knee ROM limited from about 10-60° and hip external rotation contractures also noted bilaterally  -MOE       Row Name 10/09/24 1000          Strength (Manual Muscle Testing)    Strength (Manual Muscle Testing) bilateral lower extremities  3/5  -MOE       Row Name 10/09/24 1000          Bed Mobility    Bed Mobility bed mobility (all) activities;supine-sit  -MOE     All Activities, [place] (Bed Mobility) moderate assist (50% patient effort)  -MOE     Supine-Sit [place] (Bed Mobility) maximum assist (25% patient effort)  -MOE       Row Name 10/09/24 1000          Transfers    Transfers sit-stand transfer  -MOE       Row Name 10/09/24 1000          Sit-Stand Transfer    Sit-Stand [place] (Transfers) moderate assist (50% patient effort);2 person assist  -MOE     Assistive Device (Sit-Stand Transfers) walker, front-wheeled  -MOE       Row Name 10/09/24 1000          Gait/Stairs (Locomotion)    Comment, (Gait/Stairs) pt not able to step safely for ambulation  -MOE       Row Name 10/09/24 1000          Safety Issues, Functional Mobility    Impairments Affecting Function (Mobility) balance;endurance/activity tolerance;strength;pain  -MOE       Row Name 10/09/24 1000          Balance    Balance Assessment standing static balance  -MOE     Static Standing Balance moderate assist;2-person assist  -MOE     Position/Device Used, Standing Balance walker, rolling  -MOE       Row Name             Wound 10/01/24 0944 Right distal foot    Wound - Properties Group Placement Date: 10/01/24  -NH Placement Time: 0944  -NH Side: Right  -NH  Orientation: distal  -NH Location: foot  -NH    Retired Wound - Properties Group Placement Date: 10/01/24  -NH Placement Time: 0944 -NH Side: Right  -NH Orientation: distal  -NH Location: foot  -NH    Retired Wound - Properties Group Date first assessed: 10/01/24  -NH Time first assessed: 0944 -NH Side: Right  -NH Location: foot  -NH      Row Name 10/09/24 1000          Plan of Care Review    Plan of Care Reviewed With patient  -MOE     Outcome Evaluation Pt has had no significant changes in status since the last evaluation.  His potential for rehab is poor and he does not meet criteria for skilled intervention at this time.  Will d/c PT services at this time.  -MOE       Row Name 10/09/24 1000          Therapy Assessment/Plan (PT)    Criteria for Skilled Interventions Met (PT) does not meet criteria for skilled intervention  -MOE     Therapy Frequency (PT) evaluation only  -MOE       Row Name 10/09/24 1000          PT Evaluation Complexity    History, PT Evaluation Complexity no personal factors and/or comorbidities  -MOE     Examination of Body Systems (PT Eval Complexity) total of 4 or more elements  -MOE     Clinical Presentation (PT Evaluation Complexity) stable  -MOE     Clinical Decision Making (PT Evaluation Complexity) low complexity  -MOE     Overall Complexity (PT Evaluation Complexity) low complexity  -MOE               User Key  (r) = Recorded By, (t) = Taken By, (c) = Cosigned By      Initials Name Provider Type    Dottie Foster RN Registered Nurse    Merlin De La O, PT Physical Therapist                    Physical Therapy Education       Title: PT OT SLP Therapies (In Progress)       Topic: Physical Therapy (Not Started)       Point: Mobility training (Not Started)       Learner Progress:  Not documented in this visit.              Point: Precautions (Not Started)       Learner Progress:  Not documented in this visit.                                  PT Recommendation and Plan  Anticipated  Discharge Disposition (PT): extended care facility  Therapy Frequency (PT): evaluation only  Plan of Care Reviewed With: patient  Outcome Evaluation: Pt has had no significant changes in status since the last evaluation.  His potential for rehab is poor and he does not meet criteria for skilled intervention at this time.  Will d/c PT services at this time.   Outcome Measures       Row Name 10/09/24 1000             How much help from another person do you currently need...    Turning from your back to your side while in flat bed without using bedrails? 2  -MOE      Moving from lying on back to sitting on the side of a flat bed without bedrails? 2  -OME      Moving to and from a bed to a chair (including a wheelchair)? 1  -MOE      Standing up from a chair using your arms (e.g., wheelchair, bedside chair)? 2  -MOE      Climbing 3-5 steps with a railing? 1  -MOE      To walk in hospital room? 1  -MOE      AM-PAC 6 Clicks Score (PT) 9  -MOE      Highest Level of Mobility Goal 3 --> Sit at edge of bed  -MOE         Functional Assessment    Outcome Measure Options AM-PAC 6 Clicks Basic Mobility (PT)  -MOE                User Key  (r) = Recorded By, (t) = Taken By, (c) = Cosigned By      Initials Name Provider Type    Merlin De La O PT Physical Therapist                     Time Calculation:    PT Charges       Row Name 10/09/24 1031             Time Calculation    PT Received On 10/09/24  -MOE         Untimed Charges    PT Eval/Re-eval Minutes 30  -MOE         Total Minutes    Untimed Charges Total Minutes 30  -MOE       Total Minutes 30  -MOE                User Key  (r) = Recorded By, (t) = Taken By, (c) = Cosigned By      Initials Name Provider Type    Merlin De La O PT Physical Therapist                      PT G-Codes  Outcome Measure Options: AM-PAC 6 Clicks Basic Mobility (PT)  AM-PAC 6 Clicks Score (PT): 9    Merlin Rao, PT  10/9/2024

## 2024-10-09 NOTE — THERAPY EVALUATION
SNF - Occupational Therapy Initial Evaluation   [facility]    Patient Name: Jose Shaikh  : 1960    MRN: [id number]                              Today's Date: 10/9/2024       Admit Date: 10/1/2024    Visit Dx:     ICD-10-CM ICD-9-CM   1. Difficulty in walking  R26.2 719.7     Patient Active Problem List   Diagnosis    Diabetic ulcer of left heel associated with type 2 DM    Acute osteomyelitis of left calcaneus     Diabetic ulcer of left heel associated with type 2 DM    Diabetic ulcer of right midfoot associated with type 2 DM    Paroxysmal atrial fibrillation    Essential hypertension    Hyperlipidemia LDL goal <100    Cellulitis and abscess of foot    High alkaline phosphatase level    Osteomyelitis    Onychomycosis    Onychocryptosis    Foot pain, bilateral    Osteomyelitis of foot, right, acute    Cellulitis of right foot    Type 2 diabetes mellitus, with long-term current use of insulin    Class 3 severe obesity due to excess calories with serious comorbidity and body mass index (BMI) of 45.0 to 49.9 in adult    Anxiety disorder, unspecified    Claustrophobia    Dependence on wheelchair    Depression, unspecified    Long term (current) use of anticoagulants    Long term (current) use of oral hypoglycemic drugs    Wound of foot    Ulcer of right foot    Orthostatic hypotension    Other chronic osteomyelitis, right ankle and foot    Personal history of nicotine dependence    Thrombocytopenia, unspecified    Unspecified open wound, right foot, initial encounter    Diabetic foot infection    Subacute osteomyelitis of right foot    Right foot pain    Sepsis    Onychomycosis    Foot pain, left    Impaired mobility and ADLs    Absence of toe of right foot    Corns and callosity    Disability of walking    Fracture    Limb swelling    Polyneuropathy    Pressure ulcer, stage 1    Shortness of breath    Generalized weakness    Debility    Onychomycosis    Noncompliance of patient with dietary regimen    Morbid  obesity     Past Medical History:   Diagnosis Date    Absence of toe of right foot     Acute osteomyelitis of left calcaneus  08/18/2021    Anxiety and depression     Arthritis     Cancer     Chronic pain     STATES HIS PAIN IS 10/10 AAT    Claustrophobia     Corns and callus     Diabetic ulcer of left heel associated with type 2 DM 08/18/2021    Diabetic ulcer of left heel associated with type 2 DM 07/06/2021    Diabetic ulcer of right midfoot associated with type 2 DM 08/18/2021    Difficulty walking     Essential hypertension 08/31/2021    Hammertoe     Hyperlipidemia LDL goal <100 08/31/2021    Ingrown toenail     Obesity     Paroxysmal atrial fibrillation 08/31/2021    Polyneuropathy     Pressure ulcer, stage 1     Tinea unguium     Type 2 diabetes mellitus with polyneuropathy      Past Surgical History:   Procedure Laterality Date    CYST REMOVAL      center of back; benign    EYE SURGERY      INCISION AND DRAINAGE ABSCESS      back    INCISION AND DRAINAGE LEG Right 12/10/2021    Procedure: INCISION AND DRAINAGE LOWER EXTREMITY;  Surgeon: Ash Leyva DPM;  Location: [facility] MAIN OR;  Service: Podiatry;  Laterality: Right;    OTHER SURGICAL HISTORY      Surgical clips left foot    TOE SURGERY Right     Removal of 5th toe    TRANS METATARSAL AMPUTATION Right 12/02/2021    Procedure: AMPUTATION TRANS METATARSAL;  Surgeon: Ash Leyva DPM;  Location: [facility] MAIN OR;  Service: Podiatry;  Laterality: Right;    VASCULAR SURGERY      WRIST SURGERY Left     repair of injury      General Information       Row Name 10/09/24 1418          OT Time and Intention    Document Type evaluation  -GC     Mode of Treatment occupational therapy  -GC       Row Name 10/09/24 1418          General Information    Patient Profile Reviewed yes  -GC     Prior Level of Function dependent:;max assist:;ADL's  Patient has been on SNF and bedridden due to debility, LE weakness and joint pain/contractures.  -GC      Existing Precautions/Restrictions fall  -       Row Name 10/09/24 1418          Occupational Profile    Reason for Services/Referral (Occupational Profile) Pt. is a 62 y/o WM who has been bedbound since hospitalization back in Sept. of last year.  OT referral received to evaluate for possible rehab potential for ADL training.  -       Row Name 10/09/24 1418          Living Environment    People in Home facility resident  -       Row Name 10/09/24 1418          Cognition    Orientation Status (Cognition) oriented x 3  -       Row Name 10/09/24 1418          Safety Issues, Functional Mobility    Impairments Affecting Function (Mobility) balance;endurance/activity tolerance;strength;pain;range of motion (ROM)  -               User Key  (r) = Recorded By, (t) = Taken By, (c) = Cosigned By      Initials Name Provider Type     Rosalva Irvin OT Occupational Therapist                     Mobility/ADL's       Row Name 10/09/24 1422          Bed Mobility    Bed Mobility bed mobility (all) activities  -     All Activities, [place] (Bed Mobility) 2 person assist  -     Scooting/Bridging [place] (Bed Mobility) moderate assist (50% patient effort);2 person assist  -     Supine-Sit [place] (Bed Mobility) maximum assist (25% patient effort);2 person assist  -GC     Sit-Supine [place] (Bed Mobility) maximum assist (25% patient effort);2 person assist  -     Supine-Sit-Supine [place] (Bed Mobility) maximum assist (25% patient effort);2 person assist  -     Bed Mobility, Safety Issues decreased use of legs for bridging/pushing  -     Assistive Device (Bed Mobility) bed rails;head of bed elevated;overhead trapeze  -       Row Name 10/09/24 1422          Transfers    Transfers sit-stand transfer  -       Row Name 10/09/24 1422          Sit-Stand Transfer    Sit-Stand [place] (Transfers) 2 person assist  -       Row Name 10/09/24 1422          Stand-Sit Transfer    Stand-Sit  [place] (Transfers) 2 person assist  -       Row Name 10/09/24 1422          Toilet Transfer    Comment, (Toilet Transfer) Bedpan and urinal  -[facility] Name 10/09/24 1422          Functional Mobility    Functional Mobility- Ind. Level unable to perform  -[facility] Name 10/09/24 1422          Activities of Daily Living    BADL Assessment/Intervention bathing;upper body dressing;lower body dressing;toileting  Pt. bedbound and grossly max to dep for basic adls performed in bed with nursing staff .  -       Row Name 10/09/24 1422          Lower Body Dressing Assessment/Training    [place] Level (Lower Body Dressing) dependent (less than 25% patient effort)  -[facility] Name 10/09/24 1422          Toileting Assessment/Training    Assistive Devices (Toileting) urinal;bedpan  -               User Key  (r) = Recorded By, (t) = Taken By, (c) = Cosigned By      Initials Name Provider Type     Rosalva Irvin OT Occupational Therapist                   Obj/Interventions       Row Name 10/09/24 1424          Sensory Assessment (Somatosensory)    Sensory Assessment (Somatosensory) sensation intact  -[facility] Name 10/09/24 1424          Vision Assessment/Intervention    Visual Impairment/Limitations WFL  -[facility] Name 10/09/24 1424          Range of Motion Comprehensive    Comment, General Range of Motion Limited end ROM in shoulders otherwise functional  -[facility] Name 10/09/24 1424          Strength Comprehensive (MMT)    Comment, General Manual Muscle Testing (MMT) Assessment arthritic shoulders  -[facility] Name 10/09/24 1424          Motor Skills    Motor Skills coordination;functional endurance  -     Functional Endurance Poor activity tolerance for activity OOB  -               User Key  (r) = Recorded By, (t) = Taken By, (c) = Cosigned By      Initials Name Provider Type     Rosalva Irvin, BEKAH Occupational Therapist                   Goals/Plan    No documentation.                   Clinical Impression       Row Name 10/09/24 1425          Plan of Care Review    Plan of Care Reviewed With patient  -     Progress no change  -     Outcome Evaluation Pt. presents with no changes since last evaluation with general mobility impeding all BADLS activities.  Nursing assist patient in bed with all self-care tasks due to LE weakness and pain especially in L hip and knees.  Pt. has poor rehab potential for adls training due to impaired LE arthritic pain and inability to WB functionally.  Pt. will required continued long term care.  D/C OT services at this time.  -       Row Name 10/09/24 1425          Therapy Assessment/Plan (OT)    Criteria for Skilled Therapeutic Interventions Met (OT) does not meet criteria for skilled intervention  -GC       Row Name 10/09/24 1425          Therapy Plan Review/Discharge Plan (OT)    Anticipated Discharge Disposition (OT) [facility]  -               User Key  (r) = Recorded By, (t) = Taken By, (c) = Cosigned By      Initials Name Provider Type    GC Rosalva Irvin, OT Occupational Therapist                   Outcome Measures       Row Name 10/09/24 1431          How much help from another is currently needed...    Putting on and taking off regular lower body clothing? 1  -GC     Bathing (including washing, rinsing, and drying) 2  -GC     Toileting (which includes using toilet bed pan or urinal) 1  -GC     Putting on and taking off regular upper body clothing 3  -GC     Taking care of personal grooming (such as brushing teeth) 3  -GC     Eating meals 4  -GC     AM-PAC 6 Clicks Score (OT) 14  -       Row Name 10/09/24 1045 10/09/24 1000       How much help from another person do you currently need...    Turning from your back to your side while in flat bed without using bedrails? 2  -FH 2  -MOE    Moving from lying on back to sitting on the side of a flat bed without bedrails? 2  -FH 2  -MOE    Moving to and from a bed to a chair (including a  wheelchair)? 1  -FH 1  -MOE    Standing up from a chair using your arms (e.g., wheelchair, bedside chair)? 2  -FH 2  -MOE    Climbing 3-5 steps with a railing? 1  -FH 1  -MOE    To walk in hospital room? 1  -FH 1  -MOE    AM-PAC 6 Clicks Score (PT) 9  -FH 9  -MOE    Highest Level of Mobility Goal 3 --> Sit at edge of bed  -FH 3 --> Sit at edge of bed  -MOE      Row Name 10/09/24 1431 10/09/24 1000       Functional Assessment    Outcome Measure Options AM-PAC 6 Clicks Daily Activity (OT);Optimal Instrument  -GC AM-PAC 6 Clicks Basic Mobility (PT)  -MOE      Row Name 10/09/24 1431          Optimal Instrument    Optimal Instrument Optimal - 3  -GC     Bending/Stooping 5  -GC     Standing 4  -GC     Reaching 2  -GC               User Key  (r) = Recorded By, (t) = Taken By, (c) = Cosigned By      Initials Name Provider Type     Carlos Cagle, RN Registered Nurse    Rosalva Campbell, OT Occupational Therapist    Merlin De La O, PT Physical Therapist                  Section G              Section GG                         Occupational Therapy Education       Title: PT OT SLP Therapies (In Progress)       Topic: Occupational Therapy (Not Started)       Point: ADL training (Not Started)       Description:   Instruct learner(s) on proper safety adaptation and remediation techniques during self care or transfers.   Instruct in proper use of assistive devices.                  Learner Progress:  Not documented in this visit.              Point: Home exercise program (Not Started)       Description:   Instruct learner(s) on appropriate technique for monitoring, assisting and/or progressing therapeutic exercises/activities.                  Learner Progress:  Not documented in this visit.              Point: Precautions (Not Started)       Description:   Instruct learner(s) on prescribed precautions during self-care and functional transfers.                  Learner Progress:  Not documented in this visit.               Point: Body mechanics (Not Started)       Description:   Instruct learner(s) on proper positioning and spine alignment during self-care, functional mobility activities and/or exercises.                  Learner Progress:  Not documented in this visit.                                  OT Recommendation and Plan     Plan of Care Review  Plan of Care Reviewed With: patient  Progress: no change  Outcome Evaluation: Pt. presents with no changes since last evaluation with general mobility impeding all BADLS activities.  Nursing assist patient in bed with all self-care tasks due to LE weakness and pain especially in L hip and knees.  Pt. has poor rehab potential for adls training due to impaired LE arthritic pain and inability to WB functionally.  Pt. will required continued long term care.  D/C OT services at this time.     Time Calculation:   Evaluation Complexity (OT)  Review Occupational Profile/Medical/Therapy History Complexity: extensive/high complexity  Assessment, Occupational Performance/Identification of Deficit Complexity: 5 or more performance deficits  Clinical Decision Making Complexity (OT): comprehensive assessment/high complexity  Overall Complexity of Evaluation (OT): high complexity     Time Calculation- OT       Row Name 10/09/24 1432             Untimed Charges    OT Eval/Re-eval Minutes 45  -GC         Total Minutes    Untimed Charges Total Minutes 45  -GC       Total Minutes 45  -GC                User Key  (r) = Recorded By, (t) = Taken By, (c) = Cosigned By      Initials Name Provider Type     Rosalva Irvin OT Occupational Therapist                  Therapy Charges for Today       Code Description Service Date Service Provider Modifiers Qty    [id number]  OT EVAL HIGH COMPLEXITY 3 10/9/2024 Rosalva Irvin OT GO 1                 Rosalva Irvin OT  10/9/2024

## 2024-10-10 LAB
ALBUMIN SERPL-MCNC: 2.8 G/DL (ref 3.5–5.2)
ANION GAP SERPL CALCULATED.3IONS-SCNC: 7.2 MMOL/L (ref 5–15)
BUN SERPL-MCNC: 19 MG/DL (ref 8–23)
BUN/CREAT SERPL: 28.8 (ref 7–25)
CALCIUM SPEC-SCNC: 8.2 MG/DL (ref 8.6–10.5)
CHLORIDE SERPL-SCNC: 104 MMOL/L (ref 98–107)
CO2 SERPL-SCNC: 24.8 MMOL/L (ref 22–29)
CREAT SERPL-MCNC: 0.66 MG/DL (ref 0.76–1.27)
EGFRCR SERPLBLD CKD-EPI 2021: 104.1 ML/MIN/1.73
GLUCOSE BLDC GLUCOMTR-MCNC: 121 MG/DL (ref 70–99)
GLUCOSE BLDC GLUCOMTR-MCNC: 174 MG/DL (ref 70–99)
GLUCOSE BLDC GLUCOMTR-MCNC: 186 MG/DL (ref 70–99)
GLUCOSE BLDC GLUCOMTR-MCNC: 301 MG/DL (ref 70–99)
GLUCOSE SERPL-MCNC: 81 MG/DL (ref 65–99)
INDURATION: 0 MM (ref 0–10)
Lab: NORMAL
Lab: NORMAL
PHOSPHATE SERPL-MCNC: 4.1 MG/DL (ref 2.5–4.5)
POTASSIUM SERPL-SCNC: 4.5 MMOL/L (ref 3.5–5.2)
SARS-COV-2 RNA RESP QL NAA+PROBE: NOT DETECTED
SODIUM SERPL-SCNC: 136 MMOL/L (ref 136–145)
TB SKIN TEST: NEGATIVE

## 2024-10-10 PROCEDURE — 80069 RENAL FUNCTION PANEL: CPT | Performed by: PHYSICIAN ASSISTANT

## 2024-10-10 PROCEDURE — 63710000001 INSULIN GLARGINE PER 5 UNITS: Performed by: INTERNAL MEDICINE

## 2024-10-10 PROCEDURE — 63710000001 INSULIN LISPRO (HUMAN) PER 5 UNITS: Performed by: INTERNAL MEDICINE

## 2024-10-10 PROCEDURE — 63710000001 ONDANSETRON ODT 4 MG TABLET DISPERSIBLE: Performed by: INTERNAL MEDICINE

## 2024-10-10 PROCEDURE — 82948 REAGENT STRIP/BLOOD GLUCOSE: CPT

## 2024-10-10 PROCEDURE — 87635 SARS-COV-2 COVID-19 AMP PRB: CPT | Performed by: PHYSICIAN ASSISTANT

## 2024-10-10 RX ADMIN — FERROUS SULFATE TAB 325 MG (65 MG ELEMENTAL FE) 325 MG: 325 (65 FE) TAB at 07:47

## 2024-10-10 RX ADMIN — SPIRONOLACTONE 25 MG: 25 TABLET ORAL at 18:14

## 2024-10-10 RX ADMIN — ONDANSETRON 4 MG: 4 TABLET, ORALLY DISINTEGRATING ORAL at 13:14

## 2024-10-10 RX ADMIN — SERTRALINE HYDROCHLORIDE 50 MG: 50 TABLET ORAL at 20:38

## 2024-10-10 RX ADMIN — ATORVASTATIN CALCIUM 10 MG: 10 TABLET, FILM COATED ORAL at 20:38

## 2024-10-10 RX ADMIN — BUMETANIDE 2 MG: 1 TABLET ORAL at 10:45

## 2024-10-10 RX ADMIN — OXYCODONE AND ACETAMINOPHEN 1 TABLET: 7.5; 325 TABLET ORAL at 15:51

## 2024-10-10 RX ADMIN — CETIRIZINE HYDROCHLORIDE 10 MG: 10 TABLET, FILM COATED ORAL at 10:45

## 2024-10-10 RX ADMIN — OXYCODONE AND ACETAMINOPHEN 1 TABLET: 7.5; 325 TABLET ORAL at 06:45

## 2024-10-10 RX ADMIN — PREGABALIN 25 MG: 25 CAPSULE ORAL at 15:51

## 2024-10-10 RX ADMIN — Medication 500 MG: at 10:47

## 2024-10-10 RX ADMIN — PREGABALIN 25 MG: 25 CAPSULE ORAL at 20:38

## 2024-10-10 RX ADMIN — DIGOXIN 125 MCG: 125 TABLET ORAL at 12:46

## 2024-10-10 RX ADMIN — SPIRONOLACTONE 25 MG: 25 TABLET ORAL at 10:45

## 2024-10-10 RX ADMIN — Medication 10 MG: at 20:38

## 2024-10-10 RX ADMIN — BACLOFEN 10 MG: 10 TABLET ORAL at 15:51

## 2024-10-10 RX ADMIN — Medication 500 MG: at 20:38

## 2024-10-10 RX ADMIN — CHOLESTYRAMINE 4 G: 4 POWDER, FOR SUSPENSION ORAL at 10:44

## 2024-10-10 RX ADMIN — INSULIN LISPRO 10 UNITS: 100 INJECTION, SOLUTION INTRAVENOUS; SUBCUTANEOUS at 20:36

## 2024-10-10 RX ADMIN — EMPAGLIFLOZIN 10 MG: 10 TABLET, FILM COATED ORAL at 10:46

## 2024-10-10 RX ADMIN — METOPROLOL SUCCINATE 12.5 MG: 25 TABLET, FILM COATED, EXTENDED RELEASE ORAL at 10:46

## 2024-10-10 RX ADMIN — DICLOFENAC SODIUM 4 G: 10 GEL TOPICAL at 07:47

## 2024-10-10 RX ADMIN — APIXABAN 5 MG: 5 TABLET, FILM COATED ORAL at 20:38

## 2024-10-10 RX ADMIN — BACLOFEN 10 MG: 10 TABLET ORAL at 06:45

## 2024-10-10 RX ADMIN — BISMUTH SUBSALICYLATE 524 MG: 262 TABLET, CHEWABLE ORAL at 22:49

## 2024-10-10 RX ADMIN — MONTELUKAST 10 MG: 10 TABLET, FILM COATED ORAL at 20:38

## 2024-10-10 RX ADMIN — DICLOFENAC SODIUM 4 G: 10 GEL TOPICAL at 01:45

## 2024-10-10 RX ADMIN — BISMUTH SUBSALICYLATE 524 MG: 262 TABLET, CHEWABLE ORAL at 00:30

## 2024-10-10 RX ADMIN — PREGABALIN 25 MG: 25 CAPSULE ORAL at 10:45

## 2024-10-10 RX ADMIN — INSULIN GLARGINE 22 UNITS: 100 INJECTION, SOLUTION SUBCUTANEOUS at 20:37

## 2024-10-10 RX ADMIN — INSULIN GLARGINE 25 UNITS: 100 INJECTION, SOLUTION SUBCUTANEOUS at 10:44

## 2024-10-10 RX ADMIN — APIXABAN 5 MG: 5 TABLET, FILM COATED ORAL at 10:46

## 2024-10-10 RX ADMIN — IBUPROFEN 400 MG: 400 TABLET ORAL at 00:31

## 2024-10-10 RX ADMIN — INSULIN LISPRO 3 UNITS: 100 INJECTION, SOLUTION INTRAVENOUS; SUBCUTANEOUS at 18:14

## 2024-10-10 RX ADMIN — CYANOCOBALAMIN TAB 500 MCG 1000 MCG: 500 TAB at 10:45

## 2024-10-10 RX ADMIN — INSULIN LISPRO 3 UNITS: 100 INJECTION, SOLUTION INTRAVENOUS; SUBCUTANEOUS at 12:46

## 2024-10-10 RX ADMIN — DICLOFENAC SODIUM 4 G: 10 GEL TOPICAL at 15:00

## 2024-10-10 NOTE — PLAN OF CARE
Goal Outcome Evaluation:  Plan of Care Reviewed With: patient           Outcome Evaluation: No significant changes this shift. Pt AO x4 and VSS. He uses the bedpan and urinal. Blood sugar was 249 and was covered by insulin. He requested Pepto and Ibuprofen and pain medication, somewhat effective. Will continue with his POC. Call light within reach.

## 2024-10-11 LAB
GLUCOSE BLDC GLUCOMTR-MCNC: 124 MG/DL (ref 70–99)
GLUCOSE BLDC GLUCOMTR-MCNC: 183 MG/DL (ref 70–99)
GLUCOSE BLDC GLUCOMTR-MCNC: 212 MG/DL (ref 70–99)
GLUCOSE BLDC GLUCOMTR-MCNC: 289 MG/DL (ref 70–99)

## 2024-10-11 PROCEDURE — 82948 REAGENT STRIP/BLOOD GLUCOSE: CPT

## 2024-10-11 PROCEDURE — 63710000001 INSULIN LISPRO (HUMAN) PER 5 UNITS: Performed by: INTERNAL MEDICINE

## 2024-10-11 PROCEDURE — 63710000001 INSULIN GLARGINE PER 5 UNITS: Performed by: INTERNAL MEDICINE

## 2024-10-11 RX ADMIN — SPIRONOLACTONE 25 MG: 25 TABLET ORAL at 17:39

## 2024-10-11 RX ADMIN — BACLOFEN 10 MG: 10 TABLET ORAL at 00:29

## 2024-10-11 RX ADMIN — CYANOCOBALAMIN TAB 500 MCG 1000 MCG: 500 TAB at 09:39

## 2024-10-11 RX ADMIN — MONTELUKAST 10 MG: 10 TABLET, FILM COATED ORAL at 20:57

## 2024-10-11 RX ADMIN — INSULIN LISPRO 3 UNITS: 100 INJECTION, SOLUTION INTRAVENOUS; SUBCUTANEOUS at 12:04

## 2024-10-11 RX ADMIN — Medication 500 MG: at 09:39

## 2024-10-11 RX ADMIN — METOPROLOL SUCCINATE 12.5 MG: 25 TABLET, FILM COATED, EXTENDED RELEASE ORAL at 09:39

## 2024-10-11 RX ADMIN — SERTRALINE HYDROCHLORIDE 50 MG: 50 TABLET ORAL at 20:57

## 2024-10-11 RX ADMIN — ATORVASTATIN CALCIUM 10 MG: 10 TABLET, FILM COATED ORAL at 20:57

## 2024-10-11 RX ADMIN — INSULIN LISPRO 5 UNITS: 100 INJECTION, SOLUTION INTRAVENOUS; SUBCUTANEOUS at 09:39

## 2024-10-11 RX ADMIN — BISMUTH SUBSALICYLATE 524 MG: 262 TABLET, CHEWABLE ORAL at 05:56

## 2024-10-11 RX ADMIN — Medication 500 MG: at 20:57

## 2024-10-11 RX ADMIN — BUMETANIDE 2 MG: 1 TABLET ORAL at 09:39

## 2024-10-11 RX ADMIN — INSULIN GLARGINE 22 UNITS: 100 INJECTION, SOLUTION SUBCUTANEOUS at 20:58

## 2024-10-11 RX ADMIN — INSULIN GLARGINE 25 UNITS: 100 INJECTION, SOLUTION SUBCUTANEOUS at 09:40

## 2024-10-11 RX ADMIN — OXYCODONE AND ACETAMINOPHEN 1 TABLET: 7.5; 325 TABLET ORAL at 09:51

## 2024-10-11 RX ADMIN — Medication 10 MG: at 20:57

## 2024-10-11 RX ADMIN — PREGABALIN 25 MG: 25 CAPSULE ORAL at 20:57

## 2024-10-11 RX ADMIN — EMPAGLIFLOZIN 10 MG: 10 TABLET, FILM COATED ORAL at 09:39

## 2024-10-11 RX ADMIN — APIXABAN 5 MG: 5 TABLET, FILM COATED ORAL at 09:39

## 2024-10-11 RX ADMIN — BACLOFEN 10 MG: 10 TABLET ORAL at 09:51

## 2024-10-11 RX ADMIN — INSULIN LISPRO 8 UNITS: 100 INJECTION, SOLUTION INTRAVENOUS; SUBCUTANEOUS at 20:57

## 2024-10-11 RX ADMIN — DIGOXIN 125 MCG: 125 TABLET ORAL at 12:04

## 2024-10-11 RX ADMIN — OXYCODONE AND ACETAMINOPHEN 1 TABLET: 7.5; 325 TABLET ORAL at 18:18

## 2024-10-11 RX ADMIN — SPIRONOLACTONE 25 MG: 25 TABLET ORAL at 09:39

## 2024-10-11 RX ADMIN — IBUPROFEN 400 MG: 400 TABLET ORAL at 06:00

## 2024-10-11 RX ADMIN — BACLOFEN 10 MG: 10 TABLET ORAL at 18:18

## 2024-10-11 RX ADMIN — CHOLESTYRAMINE 4 G: 4 POWDER, FOR SUSPENSION ORAL at 09:39

## 2024-10-11 RX ADMIN — BISMUTH SUBSALICYLATE 524 MG: 262 TABLET, CHEWABLE ORAL at 16:51

## 2024-10-11 RX ADMIN — APIXABAN 5 MG: 5 TABLET, FILM COATED ORAL at 20:57

## 2024-10-11 RX ADMIN — CETIRIZINE HYDROCHLORIDE 10 MG: 10 TABLET, FILM COATED ORAL at 09:39

## 2024-10-11 RX ADMIN — PREGABALIN 25 MG: 25 CAPSULE ORAL at 09:39

## 2024-10-11 RX ADMIN — FERROUS SULFATE TAB 325 MG (65 MG ELEMENTAL FE) 325 MG: 325 (65 FE) TAB at 09:39

## 2024-10-11 RX ADMIN — PREGABALIN 25 MG: 25 CAPSULE ORAL at 16:41

## 2024-10-11 RX ADMIN — OXYCODONE AND ACETAMINOPHEN 1 TABLET: 7.5; 325 TABLET ORAL at 00:29

## 2024-10-11 RX ADMIN — LISINOPRIL 2.5 MG: 2.5 TABLET ORAL at 09:39

## 2024-10-11 NOTE — PLAN OF CARE
Goal Outcome Evaluation:  Plan of Care Reviewed With: patient        Progress: no change  Outcome Evaluation: AOx4, call light and personal items within reach. Able to make needs known. C/o pain in left hip and shoulder, medication given (see emar). No other c/o pain during the shift. Bedpan used, urinal at bedside. Con't plan of care

## 2024-10-11 NOTE — PLAN OF CARE
"Goal Outcome Evaluation:           Progress: no change  Outcome Evaluation: Patient is alert and oriented x4. C/O pain to left hip at 1551 and was given Percocet and Baclofen. Meds effective. Patient refused hydrocortizone to BLE and wound care to right foot today. Diastolic BP this morning this morning was below parameters for some of his morning meds. Provider notified and he stated to \"hold lisinoprol but give the rest\". Meds given. Patient tolerated well. Voices needs. Con't current POC.                               "

## 2024-10-11 NOTE — PLAN OF CARE
Goal Outcome Evaluation:           Progress: no change  Outcome Evaluation: RSD AAOX4, cont to c/o pain to Lhip despite taking narcotics routinely. meds help reduce pain somewhat but not completely stop the pain. Makes needs known to staff.call light and personal items within reach at bedside.

## 2024-10-12 LAB
GLUCOSE BLDC GLUCOMTR-MCNC: 111 MG/DL (ref 70–99)
GLUCOSE BLDC GLUCOMTR-MCNC: 207 MG/DL (ref 70–99)
GLUCOSE BLDC GLUCOMTR-MCNC: 254 MG/DL (ref 70–99)
GLUCOSE BLDC GLUCOMTR-MCNC: 327 MG/DL (ref 70–99)

## 2024-10-12 PROCEDURE — 82948 REAGENT STRIP/BLOOD GLUCOSE: CPT

## 2024-10-12 PROCEDURE — 63710000001 ONDANSETRON ODT 4 MG TABLET DISPERSIBLE: Performed by: INTERNAL MEDICINE

## 2024-10-12 PROCEDURE — 63710000001 INSULIN GLARGINE PER 5 UNITS: Performed by: INTERNAL MEDICINE

## 2024-10-12 PROCEDURE — 63710000001 INSULIN LISPRO (HUMAN) PER 5 UNITS: Performed by: INTERNAL MEDICINE

## 2024-10-12 RX ADMIN — Medication 10 MG: at 20:27

## 2024-10-12 RX ADMIN — IBUPROFEN 400 MG: 400 TABLET ORAL at 00:23

## 2024-10-12 RX ADMIN — INSULIN LISPRO 10 UNITS: 100 INJECTION, SOLUTION INTRAVENOUS; SUBCUTANEOUS at 20:30

## 2024-10-12 RX ADMIN — CHOLESTYRAMINE 4 G: 4 POWDER, FOR SUSPENSION ORAL at 09:51

## 2024-10-12 RX ADMIN — IBUPROFEN 400 MG: 400 TABLET ORAL at 18:44

## 2024-10-12 RX ADMIN — APIXABAN 5 MG: 5 TABLET, FILM COATED ORAL at 09:53

## 2024-10-12 RX ADMIN — ONDANSETRON 4 MG: 4 TABLET, ORALLY DISINTEGRATING ORAL at 12:29

## 2024-10-12 RX ADMIN — PREGABALIN 25 MG: 25 CAPSULE ORAL at 09:52

## 2024-10-12 RX ADMIN — INSULIN GLARGINE 22 UNITS: 100 INJECTION, SOLUTION SUBCUTANEOUS at 20:28

## 2024-10-12 RX ADMIN — INSULIN LISPRO 5 UNITS: 100 INJECTION, SOLUTION INTRAVENOUS; SUBCUTANEOUS at 12:31

## 2024-10-12 RX ADMIN — PREGABALIN 25 MG: 25 CAPSULE ORAL at 17:51

## 2024-10-12 RX ADMIN — CETIRIZINE HYDROCHLORIDE 10 MG: 10 TABLET, FILM COATED ORAL at 09:52

## 2024-10-12 RX ADMIN — BUMETANIDE 2 MG: 1 TABLET ORAL at 09:56

## 2024-10-12 RX ADMIN — CYANOCOBALAMIN TAB 500 MCG 1000 MCG: 500 TAB at 09:52

## 2024-10-12 RX ADMIN — APIXABAN 5 MG: 5 TABLET, FILM COATED ORAL at 20:27

## 2024-10-12 RX ADMIN — PREGABALIN 25 MG: 25 CAPSULE ORAL at 20:27

## 2024-10-12 RX ADMIN — SPIRONOLACTONE 25 MG: 25 TABLET ORAL at 17:51

## 2024-10-12 RX ADMIN — FERROUS SULFATE TAB 325 MG (65 MG ELEMENTAL FE) 325 MG: 325 (65 FE) TAB at 09:52

## 2024-10-12 RX ADMIN — OXYCODONE AND ACETAMINOPHEN 1 TABLET: 7.5; 325 TABLET ORAL at 20:25

## 2024-10-12 RX ADMIN — EMPAGLIFLOZIN 10 MG: 10 TABLET, FILM COATED ORAL at 09:52

## 2024-10-12 RX ADMIN — OXYCODONE AND ACETAMINOPHEN 1 TABLET: 7.5; 325 TABLET ORAL at 02:27

## 2024-10-12 RX ADMIN — BACLOFEN 10 MG: 10 TABLET ORAL at 02:27

## 2024-10-12 RX ADMIN — MONTELUKAST 10 MG: 10 TABLET, FILM COATED ORAL at 20:27

## 2024-10-12 RX ADMIN — ATORVASTATIN CALCIUM 10 MG: 10 TABLET, FILM COATED ORAL at 20:27

## 2024-10-12 RX ADMIN — OXYCODONE AND ACETAMINOPHEN 1 TABLET: 7.5; 325 TABLET ORAL at 12:29

## 2024-10-12 RX ADMIN — INSULIN GLARGINE 25 UNITS: 100 INJECTION, SOLUTION SUBCUTANEOUS at 09:53

## 2024-10-12 RX ADMIN — SERTRALINE HYDROCHLORIDE 50 MG: 50 TABLET ORAL at 20:27

## 2024-10-12 RX ADMIN — INSULIN LISPRO 8 UNITS: 100 INJECTION, SOLUTION INTRAVENOUS; SUBCUTANEOUS at 17:51

## 2024-10-12 RX ADMIN — Medication 500 MG: at 20:27

## 2024-10-12 RX ADMIN — Medication 500 MG: at 09:52

## 2024-10-12 RX ADMIN — BACLOFEN 10 MG: 10 TABLET ORAL at 12:29

## 2024-10-12 RX ADMIN — BACLOFEN 10 MG: 10 TABLET ORAL at 20:25

## 2024-10-12 RX ADMIN — DIGOXIN 125 MCG: 125 TABLET ORAL at 12:29

## 2024-10-12 NOTE — PLAN OF CARE
Goal Outcome Evaluation:           Progress: no change  Outcome Evaluation: AOX4 Able to make needs known to staff.  Continue pain management  regimen for chronic pain. FSBG continues to be elevated. He snacks throughout the night and Blood sugars have been elevated.  No new changes. Continue POC.

## 2024-10-12 NOTE — PLAN OF CARE
Goal Outcome Evaluation:  Plan of Care Reviewed With: patient  Plan of Care Reviewed With: patient        Progress: no change  Outcome Evaluation: AOx4, call light and personal items within reach. Able to make needs known. C/o pain in left hip and shoulder, medication given (see emar). No other c/o pain during the shift. Bedpan used, urinal at bedside. Door dash [employer] and [employer]. Help with urinal at times. Con't plan of care

## 2024-10-13 LAB
GLUCOSE BLDC GLUCOMTR-MCNC: 109 MG/DL (ref 70–99)
GLUCOSE BLDC GLUCOMTR-MCNC: 138 MG/DL (ref 70–99)
GLUCOSE BLDC GLUCOMTR-MCNC: 180 MG/DL (ref 70–99)
GLUCOSE BLDC GLUCOMTR-MCNC: 405 MG/DL (ref 70–99)

## 2024-10-13 PROCEDURE — 63710000001 INSULIN LISPRO (HUMAN) PER 5 UNITS: Performed by: INTERNAL MEDICINE

## 2024-10-13 PROCEDURE — 63710000001 INSULIN LISPRO (HUMAN) PER 5 UNITS: Performed by: FAMILY MEDICINE

## 2024-10-13 PROCEDURE — 63710000001 INSULIN GLARGINE PER 5 UNITS: Performed by: INTERNAL MEDICINE

## 2024-10-13 PROCEDURE — 82948 REAGENT STRIP/BLOOD GLUCOSE: CPT

## 2024-10-13 RX ORDER — INSULIN LISPRO 100 [IU]/ML
6 INJECTION, SOLUTION INTRAVENOUS; SUBCUTANEOUS ONCE
Status: COMPLETED | OUTPATIENT
Start: 2024-10-13 | End: 2024-10-13

## 2024-10-13 RX ORDER — OXYCODONE AND ACETAMINOPHEN 7.5; 325 MG/1; MG/1
1 TABLET ORAL EVERY 6 HOURS PRN
Status: DISCONTINUED | OUTPATIENT
Start: 2024-10-13 | End: 2024-10-14 | Stop reason: HOSPADM

## 2024-10-13 RX ADMIN — DICLOFENAC SODIUM 4 G: 10 GEL TOPICAL at 07:32

## 2024-10-13 RX ADMIN — PREGABALIN 25 MG: 25 CAPSULE ORAL at 09:35

## 2024-10-13 RX ADMIN — SPIRONOLACTONE 25 MG: 25 TABLET ORAL at 09:34

## 2024-10-13 RX ADMIN — PREGABALIN 25 MG: 25 CAPSULE ORAL at 16:45

## 2024-10-13 RX ADMIN — EMPAGLIFLOZIN 10 MG: 10 TABLET, FILM COATED ORAL at 09:35

## 2024-10-13 RX ADMIN — OXYCODONE AND ACETAMINOPHEN 1 TABLET: 7.5; 325 TABLET ORAL at 04:22

## 2024-10-13 RX ADMIN — INSULIN GLARGINE 22 UNITS: 100 INJECTION, SOLUTION SUBCUTANEOUS at 21:16

## 2024-10-13 RX ADMIN — Medication 10 MG: at 21:16

## 2024-10-13 RX ADMIN — BACLOFEN 10 MG: 10 TABLET ORAL at 12:48

## 2024-10-13 RX ADMIN — DIGOXIN 125 MCG: 125 TABLET ORAL at 11:53

## 2024-10-13 RX ADMIN — Medication 500 MG: at 21:38

## 2024-10-13 RX ADMIN — METOPROLOL SUCCINATE 12.5 MG: 25 TABLET, FILM COATED, EXTENDED RELEASE ORAL at 09:34

## 2024-10-13 RX ADMIN — CYANOCOBALAMIN TAB 500 MCG 1000 MCG: 500 TAB at 09:34

## 2024-10-13 RX ADMIN — LISINOPRIL 2.5 MG: 2.5 TABLET ORAL at 09:34

## 2024-10-13 RX ADMIN — BACLOFEN 10 MG: 10 TABLET ORAL at 04:22

## 2024-10-13 RX ADMIN — INSULIN LISPRO 14 UNITS: 100 INJECTION, SOLUTION INTRAVENOUS; SUBCUTANEOUS at 21:38

## 2024-10-13 RX ADMIN — DICLOFENAC SODIUM 4 G: 10 GEL TOPICAL at 20:50

## 2024-10-13 RX ADMIN — ATORVASTATIN CALCIUM 10 MG: 10 TABLET, FILM COATED ORAL at 21:16

## 2024-10-13 RX ADMIN — INSULIN LISPRO 3 UNITS: 100 INJECTION, SOLUTION INTRAVENOUS; SUBCUTANEOUS at 11:53

## 2024-10-13 RX ADMIN — BACLOFEN 10 MG: 10 TABLET ORAL at 21:14

## 2024-10-13 RX ADMIN — Medication 500 MG: at 09:34

## 2024-10-13 RX ADMIN — OXYCODONE HYDROCHLORIDE AND ACETAMINOPHEN 1 TABLET: 7.5; 325 TABLET ORAL at 21:14

## 2024-10-13 RX ADMIN — ACETAMINOPHEN 650 MG: 325 TABLET ORAL at 03:41

## 2024-10-13 RX ADMIN — FERROUS SULFATE TAB 325 MG (65 MG ELEMENTAL FE) 325 MG: 325 (65 FE) TAB at 09:35

## 2024-10-13 RX ADMIN — APIXABAN 5 MG: 5 TABLET, FILM COATED ORAL at 09:34

## 2024-10-13 RX ADMIN — CETIRIZINE HYDROCHLORIDE 10 MG: 10 TABLET, FILM COATED ORAL at 09:35

## 2024-10-13 RX ADMIN — INSULIN GLARGINE 25 UNITS: 100 INJECTION, SOLUTION SUBCUTANEOUS at 09:33

## 2024-10-13 RX ADMIN — INSULIN LISPRO 6 UNITS: 100 INJECTION, SOLUTION INTRAVENOUS; SUBCUTANEOUS at 23:57

## 2024-10-13 RX ADMIN — OXYCODONE HYDROCHLORIDE AND ACETAMINOPHEN 1 TABLET: 7.5; 325 TABLET ORAL at 13:37

## 2024-10-13 RX ADMIN — SERTRALINE HYDROCHLORIDE 50 MG: 50 TABLET ORAL at 21:17

## 2024-10-13 RX ADMIN — BUMETANIDE 2 MG: 1 TABLET ORAL at 09:35

## 2024-10-13 RX ADMIN — APIXABAN 5 MG: 5 TABLET, FILM COATED ORAL at 21:16

## 2024-10-13 RX ADMIN — IBUPROFEN 400 MG: 400 TABLET ORAL at 11:53

## 2024-10-13 RX ADMIN — MONTELUKAST 10 MG: 10 TABLET, FILM COATED ORAL at 21:17

## 2024-10-13 RX ADMIN — DICLOFENAC SODIUM 4 G: 10 GEL TOPICAL at 13:37

## 2024-10-13 RX ADMIN — PREGABALIN 25 MG: 25 CAPSULE ORAL at 21:38

## 2024-10-14 ENCOUNTER — HOSPITAL ENCOUNTER (OUTPATIENT)
Facility: HOSPITAL | Age: 64
LOS: 2 days | Discharge: HOME HEALTH | End: 2024-10-24
Attending: INTERNAL MEDICINE | Admitting: INTERNAL MEDICINE
Payer: MEDICARE

## 2024-10-14 VITALS
HEART RATE: 73 BPM | RESPIRATION RATE: 18 BRPM | TEMPERATURE: 97.7 F | WEIGHT: 315 LBS | HEIGHT: 74 IN | SYSTOLIC BLOOD PRESSURE: 106 MMHG | OXYGEN SATURATION: 97 % | DIASTOLIC BLOOD PRESSURE: 54 MMHG | BODY MASS INDEX: 40.43 KG/M2

## 2024-10-14 DIAGNOSIS — M86.271 SUBACUTE OSTEOMYELITIS OF RIGHT FOOT: ICD-10-CM

## 2024-10-14 DIAGNOSIS — R26.2 DISABILITY OF WALKING: ICD-10-CM

## 2024-10-14 DIAGNOSIS — R06.02 SHORTNESS OF BREATH: ICD-10-CM

## 2024-10-14 DIAGNOSIS — E66.813 CLASS 3 SEVERE OBESITY DUE TO EXCESS CALORIES WITH SERIOUS COMORBIDITY AND BODY MASS INDEX (BMI) OF 45.0 TO 49.9 IN ADULT: Primary | Chronic | ICD-10-CM

## 2024-10-14 DIAGNOSIS — E66.01 CLASS 3 SEVERE OBESITY DUE TO EXCESS CALORIES WITH SERIOUS COMORBIDITY AND BODY MASS INDEX (BMI) OF 45.0 TO 49.9 IN ADULT: Primary | Chronic | ICD-10-CM

## 2024-10-14 DIAGNOSIS — B35.1 ONYCHOMYCOSIS: ICD-10-CM

## 2024-10-14 DIAGNOSIS — E66.01 MORBID OBESITY: ICD-10-CM

## 2024-10-14 PROBLEM — U07.1 COVID-19 VIRUS DETECTED: Status: ACTIVE | Noted: 2023-06-16

## 2024-10-14 PROBLEM — U07.1 COVID-19 VIRUS INFECTION: Status: ACTIVE | Noted: 2024-10-14

## 2024-10-14 LAB
GLUCOSE BLDC GLUCOMTR-MCNC: 131 MG/DL (ref 70–99)
GLUCOSE BLDC GLUCOMTR-MCNC: 138 MG/DL (ref 70–99)
GLUCOSE BLDC GLUCOMTR-MCNC: 193 MG/DL (ref 70–99)
GLUCOSE BLDC GLUCOMTR-MCNC: 296 MG/DL (ref 70–99)
SARS-COV-2 RNA RESP QL NAA+PROBE: DETECTED

## 2024-10-14 PROCEDURE — 63710000001 INSULIN GLARGINE PER 5 UNITS: Performed by: INTERNAL MEDICINE

## 2024-10-14 PROCEDURE — 63710000001 INSULIN LISPRO (HUMAN) PER 5 UNITS: Performed by: INTERNAL MEDICINE

## 2024-10-14 PROCEDURE — 99239 HOSP IP/OBS DSCHRG MGMT >30: CPT | Performed by: INTERNAL MEDICINE

## 2024-10-14 PROCEDURE — 87635 SARS-COV-2 COVID-19 AMP PRB: CPT | Performed by: INTERNAL MEDICINE

## 2024-10-14 PROCEDURE — 82948 REAGENT STRIP/BLOOD GLUCOSE: CPT

## 2024-10-14 RX ORDER — ALUMINA, MAGNESIA, AND SIMETHICONE 2400; 2400; 240 MG/30ML; MG/30ML; MG/30ML
15 SUSPENSION ORAL EVERY 6 HOURS PRN
Status: DISCONTINUED | OUTPATIENT
Start: 2024-10-14 | End: 2024-10-24 | Stop reason: HOSPADM

## 2024-10-14 RX ORDER — BISMUTH SUBSALICYLATE 262 MG/1
524 TABLET, CHEWABLE ORAL 3 TIMES DAILY PRN
Status: CANCELLED | OUTPATIENT
Start: 2024-10-14

## 2024-10-14 RX ORDER — SPIRONOLACTONE 25 MG/1
25 TABLET ORAL
Status: DISCONTINUED | OUTPATIENT
Start: 2024-10-15 | End: 2024-10-17

## 2024-10-14 RX ORDER — INSULIN LISPRO 100 [IU]/ML
3-14 INJECTION, SOLUTION INTRAVENOUS; SUBCUTANEOUS
Status: DISCONTINUED | OUTPATIENT
Start: 2024-10-14 | End: 2024-10-24 | Stop reason: HOSPADM

## 2024-10-14 RX ORDER — POLYETHYLENE GLYCOL 3350 17 G/17G
17 POWDER, FOR SOLUTION ORAL DAILY PRN
Status: CANCELLED | OUTPATIENT
Start: 2024-10-14

## 2024-10-14 RX ORDER — BACLOFEN 10 MG/1
10 TABLET ORAL 3 TIMES DAILY PRN
Status: DISCONTINUED | OUTPATIENT
Start: 2024-10-14 | End: 2024-10-24 | Stop reason: HOSPADM

## 2024-10-14 RX ORDER — MONTELUKAST SODIUM 10 MG/1
10 TABLET ORAL NIGHTLY
Status: CANCELLED | OUTPATIENT
Start: 2024-10-14

## 2024-10-14 RX ORDER — LISINOPRIL 5 MG/1
2.5 TABLET ORAL
Status: DISCONTINUED | OUTPATIENT
Start: 2024-10-15 | End: 2024-10-17

## 2024-10-14 RX ORDER — DIGOXIN 125 MCG
125 TABLET ORAL
Status: DISCONTINUED | OUTPATIENT
Start: 2024-10-15 | End: 2024-10-24 | Stop reason: HOSPADM

## 2024-10-14 RX ORDER — SPIRONOLACTONE 25 MG/1
25 TABLET ORAL
Status: CANCELLED | OUTPATIENT
Start: 2024-10-14

## 2024-10-14 RX ORDER — BENZOCAINE/MENTHOL 6 MG-10 MG
1 LOZENGE MUCOUS MEMBRANE
Status: DISCONTINUED | OUTPATIENT
Start: 2024-10-15 | End: 2024-10-24 | Stop reason: HOSPADM

## 2024-10-14 RX ORDER — SACCHAROMYCES BOULARDII 250 MG
500 CAPSULE ORAL 2 TIMES DAILY
Status: CANCELLED | OUTPATIENT
Start: 2024-10-14

## 2024-10-14 RX ORDER — BISACODYL 10 MG
10 SUPPOSITORY, RECTAL RECTAL DAILY PRN
Status: CANCELLED | OUTPATIENT
Start: 2024-10-14

## 2024-10-14 RX ORDER — ALUMINA, MAGNESIA, AND SIMETHICONE 2400; 2400; 240 MG/30ML; MG/30ML; MG/30ML
15 SUSPENSION ORAL EVERY 6 HOURS PRN
Status: CANCELLED | OUTPATIENT
Start: 2024-10-14

## 2024-10-14 RX ORDER — MONTELUKAST SODIUM 10 MG/1
10 TABLET ORAL NIGHTLY
Status: DISCONTINUED | OUTPATIENT
Start: 2024-10-14 | End: 2024-10-24 | Stop reason: HOSPADM

## 2024-10-14 RX ORDER — ONDANSETRON 2 MG/ML
4 INJECTION INTRAMUSCULAR; INTRAVENOUS EVERY 6 HOURS PRN
Status: CANCELLED | OUTPATIENT
Start: 2024-10-14

## 2024-10-14 RX ORDER — BISACODYL 10 MG
10 SUPPOSITORY, RECTAL RECTAL DAILY PRN
Status: DISCONTINUED | OUTPATIENT
Start: 2024-10-14 | End: 2024-10-23

## 2024-10-14 RX ORDER — OXYCODONE AND ACETAMINOPHEN 7.5; 325 MG/1; MG/1
1 TABLET ORAL EVERY 6 HOURS PRN
Status: CANCELLED | OUTPATIENT
Start: 2024-10-14 | End: 2024-10-20

## 2024-10-14 RX ORDER — ATORVASTATIN CALCIUM 10 MG/1
10 TABLET, FILM COATED ORAL NIGHTLY
Status: CANCELLED | OUTPATIENT
Start: 2024-10-14

## 2024-10-14 RX ORDER — HYDROCORTISONE 25 MG/G
CREAM TOPICAL 4 TIMES DAILY PRN
Status: CANCELLED | OUTPATIENT
Start: 2024-10-14

## 2024-10-14 RX ORDER — BACLOFEN 10 MG/1
10 TABLET ORAL 3 TIMES DAILY PRN
Status: CANCELLED | OUTPATIENT
Start: 2024-10-14

## 2024-10-14 RX ORDER — CETIRIZINE HYDROCHLORIDE 10 MG/1
10 TABLET ORAL DAILY
Status: CANCELLED | OUTPATIENT
Start: 2024-10-15

## 2024-10-14 RX ORDER — POLYETHYLENE GLYCOL 3350 17 G/17G
17 POWDER, FOR SOLUTION ORAL DAILY PRN
Status: DISCONTINUED | OUTPATIENT
Start: 2024-10-14 | End: 2024-10-23

## 2024-10-14 RX ORDER — DIGOXIN 125 MCG
125 TABLET ORAL
Status: CANCELLED | OUTPATIENT
Start: 2024-10-15

## 2024-10-14 RX ORDER — CALCIUM CARBONATE 500 MG/1
2 TABLET, CHEWABLE ORAL 2 TIMES DAILY PRN
Status: DISCONTINUED | OUTPATIENT
Start: 2024-10-14 | End: 2024-10-24 | Stop reason: HOSPADM

## 2024-10-14 RX ORDER — AMOXICILLIN 250 MG
2 CAPSULE ORAL 2 TIMES DAILY PRN
Status: DISCONTINUED | OUTPATIENT
Start: 2024-10-14 | End: 2024-10-23

## 2024-10-14 RX ORDER — FAMOTIDINE 20 MG/1
20 TABLET, FILM COATED ORAL DAILY
Status: CANCELLED | OUTPATIENT
Start: 2024-10-14

## 2024-10-14 RX ORDER — BUMETANIDE 1 MG/1
2 TABLET ORAL DAILY
Status: DISCONTINUED | OUTPATIENT
Start: 2024-10-15 | End: 2024-10-18

## 2024-10-14 RX ORDER — INSULIN LISPRO 100 [IU]/ML
3-14 INJECTION, SOLUTION INTRAVENOUS; SUBCUTANEOUS
Status: CANCELLED | OUTPATIENT
Start: 2024-10-14

## 2024-10-14 RX ORDER — AMOXICILLIN 250 MG
2 CAPSULE ORAL 2 TIMES DAILY PRN
Status: CANCELLED | OUTPATIENT
Start: 2024-10-14

## 2024-10-14 RX ORDER — ONDANSETRON 2 MG/ML
4 INJECTION INTRAMUSCULAR; INTRAVENOUS EVERY 6 HOURS PRN
Status: DISCONTINUED | OUTPATIENT
Start: 2024-10-14 | End: 2024-10-24 | Stop reason: HOSPADM

## 2024-10-14 RX ORDER — CALCIUM CARBONATE 500 MG/1
2 TABLET, CHEWABLE ORAL 2 TIMES DAILY PRN
Status: CANCELLED | OUTPATIENT
Start: 2024-10-14

## 2024-10-14 RX ORDER — FAMOTIDINE 20 MG/1
20 TABLET, FILM COATED ORAL DAILY
Status: DISCONTINUED | OUTPATIENT
Start: 2024-10-15 | End: 2024-10-24 | Stop reason: HOSPADM

## 2024-10-14 RX ORDER — BISMUTH SUBSALICYLATE 262 MG/1
524 TABLET, CHEWABLE ORAL 3 TIMES DAILY PRN
Status: DISCONTINUED | OUTPATIENT
Start: 2024-10-14 | End: 2024-10-24 | Stop reason: HOSPADM

## 2024-10-14 RX ORDER — OXYCODONE AND ACETAMINOPHEN 7.5; 325 MG/1; MG/1
1 TABLET ORAL EVERY 6 HOURS PRN
Status: DISCONTINUED | OUTPATIENT
Start: 2024-10-14 | End: 2024-10-24 | Stop reason: HOSPADM

## 2024-10-14 RX ORDER — BISACODYL 5 MG/1
5 TABLET, DELAYED RELEASE ORAL DAILY PRN
Status: CANCELLED | OUTPATIENT
Start: 2024-10-14

## 2024-10-14 RX ORDER — NICOTINE 21 MG/24HR
1 PATCH, TRANSDERMAL 24 HOURS TRANSDERMAL DAILY PRN
Status: DISCONTINUED | OUTPATIENT
Start: 2024-10-14 | End: 2024-10-24 | Stop reason: HOSPADM

## 2024-10-14 RX ORDER — ATORVASTATIN CALCIUM 10 MG/1
10 TABLET, FILM COATED ORAL NIGHTLY
Status: DISCONTINUED | OUTPATIENT
Start: 2024-10-14 | End: 2024-10-24 | Stop reason: HOSPADM

## 2024-10-14 RX ORDER — PREGABALIN 25 MG/1
25 CAPSULE ORAL 3 TIMES DAILY
Status: DISCONTINUED | OUTPATIENT
Start: 2024-10-14 | End: 2024-10-24 | Stop reason: HOSPADM

## 2024-10-14 RX ORDER — CETIRIZINE HYDROCHLORIDE 10 MG/1
10 TABLET ORAL DAILY
Status: DISCONTINUED | OUTPATIENT
Start: 2024-10-15 | End: 2024-10-24 | Stop reason: HOSPADM

## 2024-10-14 RX ORDER — METOPROLOL SUCCINATE 25 MG/1
12.5 TABLET, EXTENDED RELEASE ORAL
Status: DISCONTINUED | OUTPATIENT
Start: 2024-10-15 | End: 2024-10-24 | Stop reason: HOSPADM

## 2024-10-14 RX ORDER — HYDROCORTISONE 25 MG/G
CREAM TOPICAL 4 TIMES DAILY PRN
Status: DISCONTINUED | OUTPATIENT
Start: 2024-10-14 | End: 2024-10-24 | Stop reason: HOSPADM

## 2024-10-14 RX ORDER — ACETAMINOPHEN 325 MG/1
650 TABLET ORAL EVERY 4 HOURS PRN
Status: CANCELLED | OUTPATIENT
Start: 2024-10-14

## 2024-10-14 RX ORDER — BISACODYL 5 MG/1
5 TABLET, DELAYED RELEASE ORAL DAILY PRN
Status: DISCONTINUED | OUTPATIENT
Start: 2024-10-14 | End: 2024-10-23

## 2024-10-14 RX ORDER — NICOTINE 21 MG/24HR
1 PATCH, TRANSDERMAL 24 HOURS TRANSDERMAL DAILY PRN
Status: CANCELLED | OUTPATIENT
Start: 2024-10-14

## 2024-10-14 RX ORDER — SACCHAROMYCES BOULARDII 250 MG
500 CAPSULE ORAL 2 TIMES DAILY
Status: DISCONTINUED | OUTPATIENT
Start: 2024-10-14 | End: 2024-10-24 | Stop reason: HOSPADM

## 2024-10-14 RX ORDER — PREGABALIN 25 MG/1
25 CAPSULE ORAL 3 TIMES DAILY
Status: CANCELLED | OUTPATIENT
Start: 2024-10-14

## 2024-10-14 RX ORDER — LISINOPRIL 2.5 MG/1
2.5 TABLET ORAL
Status: CANCELLED | OUTPATIENT
Start: 2024-10-15

## 2024-10-14 RX ORDER — METOPROLOL SUCCINATE 25 MG/1
12.5 TABLET, EXTENDED RELEASE ORAL
Status: CANCELLED | OUTPATIENT
Start: 2024-10-15

## 2024-10-14 RX ORDER — CHOLESTYRAMINE LIGHT 4 G/5.7G
1 POWDER, FOR SUSPENSION ORAL DAILY
Status: DISCONTINUED | OUTPATIENT
Start: 2024-10-15 | End: 2024-10-24 | Stop reason: HOSPADM

## 2024-10-14 RX ORDER — BENZOCAINE/MENTHOL 6 MG-10 MG
1 LOZENGE MUCOUS MEMBRANE
Status: CANCELLED | OUTPATIENT
Start: 2024-10-15

## 2024-10-14 RX ORDER — BUMETANIDE 1 MG/1
2 TABLET ORAL DAILY
Status: CANCELLED | OUTPATIENT
Start: 2024-10-15

## 2024-10-14 RX ORDER — CHOLESTYRAMINE LIGHT 4 G/5.7G
1 POWDER, FOR SUSPENSION ORAL DAILY
Status: CANCELLED | OUTPATIENT
Start: 2024-10-15

## 2024-10-14 RX ORDER — ACETAMINOPHEN 325 MG/1
650 TABLET ORAL EVERY 4 HOURS PRN
Status: DISCONTINUED | OUTPATIENT
Start: 2024-10-14 | End: 2024-10-24 | Stop reason: HOSPADM

## 2024-10-14 RX ADMIN — BISMUTH SUBSALICYLATE 524 MG: 262 TABLET, CHEWABLE ORAL at 04:46

## 2024-10-14 RX ADMIN — CYANOCOBALAMIN TAB 500 MCG 1000 MCG: 500 TAB at 09:33

## 2024-10-14 RX ADMIN — PREGABALIN 25 MG: 25 CAPSULE ORAL at 22:01

## 2024-10-14 RX ADMIN — SPIRONOLACTONE 25 MG: 25 TABLET ORAL at 18:46

## 2024-10-14 RX ADMIN — EMPAGLIFLOZIN 10 MG: 10 TABLET, FILM COATED ORAL at 09:33

## 2024-10-14 RX ADMIN — Medication 500 MG: at 22:01

## 2024-10-14 RX ADMIN — OXYCODONE HYDROCHLORIDE AND ACETAMINOPHEN 1 TABLET: 7.5; 325 TABLET ORAL at 22:01

## 2024-10-14 RX ADMIN — SERTRALINE HYDROCHLORIDE 50 MG: 50 TABLET ORAL at 22:00

## 2024-10-14 RX ADMIN — MONTELUKAST 10 MG: 10 TABLET, FILM COATED ORAL at 22:01

## 2024-10-14 RX ADMIN — Medication 10 MG: at 22:01

## 2024-10-14 RX ADMIN — DIGOXIN 125 MCG: 125 TABLET ORAL at 11:46

## 2024-10-14 RX ADMIN — DICLOFENAC SODIUM 4 G: 10 GEL TOPICAL at 22:02

## 2024-10-14 RX ADMIN — APIXABAN 5 MG: 5 TABLET, FILM COATED ORAL at 22:01

## 2024-10-14 RX ADMIN — Medication 500 MG: at 09:32

## 2024-10-14 RX ADMIN — ATORVASTATIN CALCIUM 10 MG: 10 TABLET, FILM COATED ORAL at 22:00

## 2024-10-14 RX ADMIN — BUMETANIDE 2 MG: 1 TABLET ORAL at 09:32

## 2024-10-14 RX ADMIN — FERROUS SULFATE TAB 325 MG (65 MG ELEMENTAL FE) 325 MG: 325 (65 FE) TAB at 08:41

## 2024-10-14 RX ADMIN — IBUPROFEN 400 MG: 400 TABLET ORAL at 10:34

## 2024-10-14 RX ADMIN — OXYCODONE HYDROCHLORIDE AND ACETAMINOPHEN 1 TABLET: 7.5; 325 TABLET ORAL at 05:32

## 2024-10-14 RX ADMIN — CHOLESTYRAMINE 4 G: 4 POWDER, FOR SUSPENSION ORAL at 09:32

## 2024-10-14 RX ADMIN — INSULIN GLARGINE 25 UNITS: 100 INJECTION, SOLUTION SUBCUTANEOUS at 09:32

## 2024-10-14 RX ADMIN — INSULIN LISPRO 8 UNITS: 100 INJECTION, SOLUTION INTRAVENOUS; SUBCUTANEOUS at 11:46

## 2024-10-14 RX ADMIN — CETIRIZINE HYDROCHLORIDE 10 MG: 10 TABLET, FILM COATED ORAL at 09:33

## 2024-10-14 RX ADMIN — INSULIN LISPRO 3 UNITS: 100 INJECTION, SOLUTION INTRAVENOUS; SUBCUTANEOUS at 22:22

## 2024-10-14 RX ADMIN — OXYCODONE HYDROCHLORIDE AND ACETAMINOPHEN 1 TABLET: 7.5; 325 TABLET ORAL at 13:01

## 2024-10-14 RX ADMIN — PREGABALIN 25 MG: 25 CAPSULE ORAL at 16:56

## 2024-10-14 RX ADMIN — INSULIN GLARGINE 22 UNITS: 100 INJECTION, SOLUTION SUBCUTANEOUS at 22:21

## 2024-10-14 RX ADMIN — BACLOFEN 10 MG: 10 TABLET ORAL at 22:00

## 2024-10-14 RX ADMIN — PREGABALIN 25 MG: 25 CAPSULE ORAL at 09:32

## 2024-10-14 RX ADMIN — APIXABAN 5 MG: 5 TABLET, FILM COATED ORAL at 09:32

## 2024-10-14 RX ADMIN — SPIRONOLACTONE 25 MG: 25 TABLET ORAL at 09:33

## 2024-10-14 RX ADMIN — BACLOFEN 10 MG: 10 TABLET ORAL at 05:32

## 2024-10-14 RX ADMIN — METOPROLOL SUCCINATE 12.5 MG: 25 TABLET, FILM COATED, EXTENDED RELEASE ORAL at 09:33

## 2024-10-14 RX ADMIN — LISINOPRIL 2.5 MG: 2.5 TABLET ORAL at 09:33

## 2024-10-14 RX ADMIN — BACLOFEN 10 MG: 10 TABLET ORAL at 13:01

## 2024-10-14 NOTE — NURSING NOTE
"Jaspreetat Chavez QUAN, reported to nurse that patient was making derogatory remarks about KADEEM Whiting, calling her a \"stupid blond b**ch\". KADEEM Whiting told not to return to room that shift.  "

## 2024-10-14 NOTE — PLAN OF CARE
Goal Outcome Evaluation:           Progress: no change  Outcome Evaluation: Rsd AAOX4 makes needs known to staff. Blood glucose 405, contacted md, order received for supplemental Insulin. remains bedbond. continue POC.

## 2024-10-14 NOTE — PLAN OF CARE
Goal Outcome Evaluation:  Plan of Care Reviewed With: patient  Plan of Care Reviewed With: patient        Progress: no change  Outcome Evaluation: Pt Alert and oriented and pleasant with staff this shift. X3 max assist for transfers and ambulation. X2 admin PRN pain medicaiton with relief of symptoms noted. Change of status this shift. Pt tested positive for Covid 19, and will be transferred to Acute side per SNF requiriment. Sitting up in bed, call light in reach.

## 2024-10-14 NOTE — DISCHARGE SUMMARY
[facility]        HOSPITALIST  DISCHARGE SUMMARY    Patient Name: Jose Shaikh  : 1960  MRN: [id number]    Date of Admission: 10/1/2024  Date of Discharge:  10/14/2024  Primary Care Physician: Delia Cervantes APRN    Consults       Date and Time Order Name Status Description    10/1/2024  3:25 PM Inpatient Podiatry Consult      10/1/2024  3:25 PM Inpatient Orthopedic Surgery Consult      2024  1:01 PM Inpatient Podiatry Consult Completed             Active and Resolved Hospital Problems:  Active Hospital Problems    Diagnosis POA    **Generalized weakness [R53.1] Yes    COVID-19 virus detected [U07.1] Yes      Resolved Hospital Problems   No resolved problems to display.   Hospital-acquired weakness  Noncompliance  Hx of Influenza A  Hx of C. difficile diarrhea treated  Generalized weakness  Deconditioning  DM (Kami Garcia and PCP)  HTN  PAF (on Eliquis; previously seen Dr. Duval)  Morbid obesity with BMI 44  Debility with wheelchair dependence  Hx of severe left hip pain  Severe degenerative joint disease of lower extremities  Hx L calcaneus osteomyelitis  Chronic venous stasis  Hx R transmetatarsal amputation  Chronic bilateral foot wounds with right transmetatarsal amputation, healing by secondary intention (wound care clinic; podiatrist Gordon)  Thrombocytopenia  Conjunctivitis'  Hemorrhoids  Anasarca.    COVID virus detected     Hospital Course     Hospital Course:  Jose Shaikh is a 63 y.o. male itially hospitalized on 9/10/2023, prolonged hospitalization for treatment of management of generalized weakness, deconditioning, with acute issues of diarrhea, C. difficile negative.  Diarrhea improved.  Had small hematoma after ambulating on his foot.  Unfortunately with exhaustive efforts to try to place this gentleman after 39 days of hospitalization, we are unable to find a facility that will accept him.  He has no further acute needs or requirements for inpatient  monitoring and management, his labs and vitals are stable, he is tolerating oral intake, and has been refusing turns and repositionings and other interventions with nursing staff despite recommendations.  Patient discharged in hemodynamically stable condition on 10/19/2023 to follow-up with PCP within 1 week. Unfortunately we cannot solve all of his social issues during this hospitalization due to lack of resources and participation from the patient's perspective despite all our efforts.  Patient has a financial means of making arrangements at home.  Patient appealed his discharge.  Lost his appeal.  LCD: 10/20/23, PFL beginning: 10/21/23 @ 12pm.  Due to an inability to place the patient in a.m. nursing home he has been placed in our skilled nursing facility until arrangements can be made or until he is able to care for himself.  Patient open to the idea of bariatric surgery for weight loss.        Interval Followup:   Blood pressure soft.  On room air   Patient COVID screen is positive on October 14.  Last COVID test was on October 10 and was negative  Patient denies any symptoms like cough, chest pain, shortness of air, runny stuffy nose, sore throat, fever or chills.  Denies any taste or loss of smell.  Denies any vomiting or or new diarrhea.  Patient has been noncompliant with diet and fluid restrictions in the past.  Has been door Dashing sugary desserts and soft drinks as he likes.  In the past patient refused fluid striction after few days.  Because of COVID test positive at skilled nursing facility patient is being transferred to acute care side per skilled nursing facility policy.  Patient has gained 11 pounds from  October 2nd weight of 330 pounds, most recent weight on October 9th was 341 pounds due to not following fluid restriction instructions.  Will offer Paxlovid as patient is diabetic and bedbound.  DISCHARGE Follow Up Recommendations for labs and diagnostics: PCP, cardiology and  orthopedic.      Day of Discharge     Vital Signs:  Temp:  [97.3 °F (36.3 °C)-97.7 °F (36.5 °C)] 97.7 °F (36.5 °C)  Heart Rate:  [67-73] 73  Resp:  [18] 18  BP: ()/(54-61) 106/54    Physical Exam:   Constitutional: No distress.  Alert and conversational.  Respiratory: No wheeze noted.  GI: Abdomen: Obese.  Indurated edema lower abdominal and proximal thighs  Neuro/psych:  Calm mood.  No dysarthria.  Extremities: chronic edema mike LE, R>L.  Right foot transmetatarsal amputation with dressing    Discharge Details        Discharge Medications        ASK your doctor about these medications        Instructions Start Date   acetaminophen 650 MG 8 hr tablet  Commonly known as: TYLENOL   650 mg, Oral, Every 8 Hours PRN      apixaban 5 MG tablet tablet  Commonly known as: ELIQUIS   5 mg, Oral, Every 12 Hours Scheduled      digoxin 125 MCG tablet  Commonly known as: LANOXIN   125 mcg, Oral, Daily Digoxin      HumaLOG KwikPen 100 UNIT/ML solution pen-injector  Generic drug: Insulin Lispro (1 Unit Dial)   INJECT 30 UNITS UNDER THE SKIN INTO THE APPROPRIATE AREA AS DIRECTED 3 (THREE) TIMES A DAY BEFORE MEALS.      HYDROcodone-acetaminophen 7.5-325 MG per tablet  Commonly known as: NORCO   1 tablet, Oral, Every 4 Hours PRN      Levemir FlexPen 100 UNIT/ML injection  Generic drug: insulin detemir   40 Units, Subcutaneous, 2 Times Daily      lisinopril 2.5 MG tablet  Commonly known as: PRINIVIL,ZESTRIL   2.5 mg, Oral, Every 24 Hours Scheduled      naloxone 4 MG/0.1ML nasal spray  Commonly known as: NARCAN   Call 911. Don't prime. [employer] in 1 nostril for overdose. Repeat in 2-3 minutes in other nostril if no or minimal breathing/responsiveness.      Ozempic (0.25 or 0.5 MG/DOSE) 2 MG/3ML solution pen-injector  Generic drug: Semaglutide(0.25 or 0.5MG/DOS)   0.5 mg, Subcutaneous, Every 7 Days      Ozempic (1 MG/DOSE) 4 MG/3ML solution pen-injector  Generic drug: Semaglutide (1 MG/DOSE)   1 mg, Subcutaneous, Every 7 Days       Ozempic (1 MG/DOSE) 4 MG/3ML solution pen-injector  Generic drug: Semaglutide (1 MG/DOSE)   Inject 1 mg under the skin into the appropriate area once weekly as directed.      Ozempic (2 MG/DOSE) 8 MG/3ML solution pen-injector  Generic drug: Semaglutide (2 MG/DOSE)   2 mg, Subcutaneous, Every 7 Days      pregabalin 25 MG capsule  Commonly known as: LYRICA   25 mg, Oral, 3 Times Daily      simvastatin 20 MG tablet  Commonly known as: ZOCOR   20 mg, Oral, Nightly               Allergies   Allergen Reactions    Adhesive Tape Rash       Discharge Disposition:  Short Term Hospital ([facility])    Diet: Carb consistent low-salt      Discharge Activity:   Activity Instructions       Activity as Tolerated              CODE STATUS:  Code Status and Medical Interventions: CPR (Attempt to Resuscitate); Full Support   Ordered at: 10/01/24 1525     Code Status (Patient has no pulse and is not breathing):    CPR (Attempt to Resuscitate)     Medical Interventions (Patient has pulse or is breathing):    Full Support         No future appointments.    Additional Instructions for the Follow-ups that You Need to Schedule       Discharge Follow-up with PCP   As directed       Currently Documented PCP:    Delia Cervantes APRN    PCP Phone Number:    [phone]     Follow Up Details: 1 week        Discharge Follow-up with Specialty: Orthopedic surgery   As directed      Specialty: Orthopedic surgery        Discharge Follow-up with Specified Provider: Cardiology   As directed      To: Cardiology                Pertinent  and/or Most Recent Results     PROCEDURES:   * Cannot find OR case *     LAB RESULTS:          Lab 10/10/24  0516   SODIUM 136   POTASSIUM 4.5   CHLORIDE 104   CO2 24.8   ANION GAP 7.2   BUN 19   CREATININE 0.66*   EGFR 104.1   GLUCOSE 81   CALCIUM 8.2*   PHOSPHORUS 4.1         Lab 10/10/24  0516   ALBUMIN 2.8*                     Brief Urine Lab Results  (Last result in the past 365 days)        Color    Clarity   Blood   Leuk Est   Nitrite   Protein   CREAT   Urine HCG        09/15/24 1741 Yellow   Cloudy   Negative   Negative   Negative   Negative                 Microbiology Results (last 10 days)       Procedure Component Value - Date/Time    COVID PRE-OP / PRE-PROCEDURE SCREENING ORDER (NO ISOLATION) - Swab, Nasal Cavity [[id number]]  (Abnormal) Collected: 10/14/24 0744    Lab Status: Final result Specimen: Swab from Nasal Cavity Updated: 10/14/24 1206    Narrative:      The following orders were created for panel order COVID PRE-OP / PRE-PROCEDURE SCREENING ORDER (NO ISOLATION) - Swab, Nasal Cavity.  Procedure                               Abnormality         Status                     ---------                               -----------         ------                     COVID-19,CEPHEID/DOMINIQUE,CO...[[id number]]  Abnormal            Final result                 Please view results for these tests on the individual orders.    COVID-19,CEPHEID/DOMINIQUE,COR/LEONARD/PAD/LEXI/LAG/BETSY IN-HOUSE,NP SWAB IN TRANSPORT MEDIA 1 HR TAT, RT-PCR - Swab, Nasopharynx [[id number]]  (Abnormal) Collected: 10/14/24 0744    Lab Status: Final result Specimen: Swab from Nasopharynx Updated: 10/14/24 1206     COVID19 Detected    Narrative:      Fact sheet for providers: https://www.fda.gov/media/864380/download     Fact sheet for patients: https://www.fda.gov/media/946103/download  Fact sheet for providers: https://www.fda.gov/media/172867/download     Fact sheet for patients: https://www.fda.gov/media/462979/download    COVID PRE-OP / PRE-PROCEDURE SCREENING ORDER (NO ISOLATION) - Swab, Nasal Cavity [[id number]]  (Normal) Collected: 10/10/24 0747    Lab Status: Final result Specimen: Swab from Nasal Cavity Updated: 10/10/24 1106    Narrative:      The following orders were created for panel order COVID PRE-OP / PRE-PROCEDURE SCREENING ORDER (NO ISOLATION) - Swab, Nasal Cavity.  Procedure                               Abnormality         Status                      ---------                               -----------         ------                     COVID-19,CEPHEID/DOMINIQUE,CO...[[id number]]  Normal              Final result                 Please view results for these tests on the individual orders.    COVID-19,CEPHEID/DOMINIQUE,COR/LEONARD/PAD/LEXI/LAG/BETSY IN-HOUSE,NP SWAB IN TRANSPORT MEDIA 1 HR TAT, RT-PCR - Swab, Nasopharynx [[id number]]  (Normal) Collected: 10/10/24 0747    Lab Status: Final result Specimen: Swab from Nasopharynx Updated: 10/10/24 1106     COVID19 Not Detected    Narrative:      Fact sheet for providers: https://www.fda.gov/media/403046/download     Fact sheet for patients: https://www.fda.gov/media/308851/download  Fact sheet for providers: https://www.fda.gov/media/268258/download     Fact sheet for patients: https://www.fda.gov/media/641370/download    COVID PRE-OP / PRE-PROCEDURE SCREENING ORDER (NO ISOLATION) - Swab, Nasal Cavity [[id number]]  (Normal) Collected: 10/07/24 0804    Lab Status: Final result Specimen: Swab from Nasal Cavity Updated: 10/07/24 1218    Narrative:      The following orders were created for panel order COVID PRE-OP / PRE-PROCEDURE SCREENING ORDER (NO ISOLATION) - Swab, Nasal Cavity.  Procedure                               Abnormality         Status                     ---------                               -----------         ------                     COVID-19,CEPHEID/DOMINIQUE,CO...[[id number]]  Normal              Final result                 Please view results for these tests on the individual orders.    COVID-19,CEPHEID/DOMINIQUE,COR/LEONARD/PAD/LEXI/LAG/BETSY IN-HOUSE,NP SWAB IN TRANSPORT MEDIA 1 HR TAT, RT-PCR - Swab, Nasopharynx [[id number]]  (Normal) Collected: 10/07/24 0804    Lab Status: Final result Specimen: Swab from Nasopharynx Updated: 10/07/24 1218     COVID19 Not Detected    Narrative:      Fact sheet for providers: https://www.fda.gov/media/908467/download     Fact sheet for patients:  https://www.fda.gov/media/391843/download  Fact sheet for providers: https://www.fda.gov/media/648464/download     Fact sheet for patients: https://www.fda.gov/media/802168/download                 Results for orders placed during the hospital encounter of 11/06/23    Duplex Venous Lower Extremity - Right CV-READ    Interpretation Summary    Limited study.  Inadequate images obtained.  No evidence of deep venous thrombosis in the right mid thigh femoral vein.      Results for orders placed during the hospital encounter of 11/06/23    Duplex Venous Lower Extremity - Right CV-READ    Interpretation Summary    Limited study.  Inadequate images obtained.  No evidence of deep venous thrombosis in the right mid thigh femoral vein.          Imaging Results (All)       None             Labs Pending at Discharge:          Time spent on Discharge including face to face service: 31 minutes  Part of this note may be an electronic transcription/translation of spoken language to printed text using the Dragon Dictation System.     TElectronically signed by Shekhar Au MD, 10/14/24, 4:09 PM EDT.

## 2024-10-15 ENCOUNTER — APPOINTMENT (OUTPATIENT)
Dept: CARDIOLOGY | Facility: HOSPITAL | Age: 64
End: 2024-10-15
Payer: MEDICARE

## 2024-10-15 LAB
ALBUMIN SERPL-MCNC: 2.9 G/DL (ref 3.5–5.2)
ALBUMIN/GLOB SERPL: 1 G/DL
ALP SERPL-CCNC: 197 U/L (ref 39–117)
ALT SERPL W P-5'-P-CCNC: 38 U/L (ref 1–41)
ANION GAP SERPL CALCULATED.3IONS-SCNC: 6.6 MMOL/L (ref 5–15)
AORTIC DIMENSIONLESS INDEX: 0.77 (DI)
ASCENDING AORTA: 3.4 CM
AST SERPL-CCNC: 48 U/L (ref 1–40)
BH CV ECHO MEAS - ACS: 1.95 CM
BH CV ECHO MEAS - AO MAX PG: 9.4 MMHG
BH CV ECHO MEAS - AO MEAN PG: 4.7 MMHG
BH CV ECHO MEAS - AO ROOT DIAM: 3.4 CM
BH CV ECHO MEAS - AO V2 MAX: 153.4 CM/SEC
BH CV ECHO MEAS - AO V2 VTI: 30.8 CM
BH CV ECHO MEAS - AVA(I,D): 2.9 CM2
BH CV ECHO MEAS - EDV(CUBED): 123.3 ML
BH CV ECHO MEAS - EDV(MOD-SP2): 129 ML
BH CV ECHO MEAS - EDV(MOD-SP4): 126 ML
BH CV ECHO MEAS - EF(MOD-BP): 64.8 %
BH CV ECHO MEAS - EF(MOD-SP2): 66.9 %
BH CV ECHO MEAS - EF(MOD-SP4): 64.4 %
BH CV ECHO MEAS - ESV(CUBED): 29.1 ML
BH CV ECHO MEAS - ESV(MOD-SP2): 42.7 ML
BH CV ECHO MEAS - ESV(MOD-SP4): 44.9 ML
BH CV ECHO MEAS - FS: 38.2 %
BH CV ECHO MEAS - IVS/LVPW: 0.85 CM
BH CV ECHO MEAS - IVSD: 1.12 CM
BH CV ECHO MEAS - LA DIMENSION: 4.5 CM
BH CV ECHO MEAS - LAT PEAK E' VEL: 13.2 CM/SEC
BH CV ECHO MEAS - LV MASS(C)D: 238.6 GRAMS
BH CV ECHO MEAS - LV MAX PG: 5.7 MMHG
BH CV ECHO MEAS - LV MEAN PG: 3.5 MMHG
BH CV ECHO MEAS - LV V1 MAX: 118.9 CM/SEC
BH CV ECHO MEAS - LV V1 VTI: 23.7 CM
BH CV ECHO MEAS - LVIDD: 5 CM
BH CV ECHO MEAS - LVIDS: 3.1 CM
BH CV ECHO MEAS - LVOT AREA: 3.8 CM2
BH CV ECHO MEAS - LVOT DIAM: 2.2 CM
BH CV ECHO MEAS - LVPWD: 1.33 CM
BH CV ECHO MEAS - MV A MAX VEL: 114 CM/SEC
BH CV ECHO MEAS - MV DEC SLOPE: 491.5 CM/SEC2
BH CV ECHO MEAS - MV DEC TIME: 0.27 SEC
BH CV ECHO MEAS - MV E MAX VEL: 117 CM/SEC
BH CV ECHO MEAS - MV E/A: 1.03
BH CV ECHO MEAS - MV MAX PG: 6.7 MMHG
BH CV ECHO MEAS - MV MEAN PG: 2.48 MMHG
BH CV ECHO MEAS - MV P1/2T: 77.7 MSEC
BH CV ECHO MEAS - MV V2 VTI: 32.8 CM
BH CV ECHO MEAS - MVA(P1/2T): 2.8 CM2
BH CV ECHO MEAS - MVA(VTI): 2.7 CM2
BH CV ECHO MEAS - RVDD: 4 CM
BH CV ECHO MEAS - SV(LVOT): 90.1 ML
BH CV ECHO MEAS - SV(MOD-SP2): 86.3 ML
BH CV ECHO MEAS - SV(MOD-SP4): 81.1 ML
BILIRUB SERPL-MCNC: 0.5 MG/DL (ref 0–1.2)
BUN SERPL-MCNC: 22 MG/DL (ref 8–23)
BUN/CREAT SERPL: 29.3 (ref 7–25)
CALCIUM SPEC-SCNC: 8.1 MG/DL (ref 8.6–10.5)
CHLORIDE SERPL-SCNC: 103 MMOL/L (ref 98–107)
CO2 SERPL-SCNC: 24.4 MMOL/L (ref 22–29)
CREAT SERPL-MCNC: 0.75 MG/DL (ref 0.76–1.27)
EGFRCR SERPLBLD CKD-EPI 2021: 100.1 ML/MIN/1.73
GLOBULIN UR ELPH-MCNC: 3 GM/DL
GLUCOSE BLDC GLUCOMTR-MCNC: 124 MG/DL (ref 70–99)
GLUCOSE BLDC GLUCOMTR-MCNC: 136 MG/DL (ref 70–99)
GLUCOSE BLDC GLUCOMTR-MCNC: 167 MG/DL (ref 70–99)
GLUCOSE BLDC GLUCOMTR-MCNC: 183 MG/DL (ref 70–99)
GLUCOSE SERPL-MCNC: 145 MG/DL (ref 65–99)
IVRT: 58 MS
POTASSIUM SERPL-SCNC: 4.3 MMOL/L (ref 3.5–5.2)
PROT SERPL-MCNC: 5.9 G/DL (ref 6–8.5)
SODIUM SERPL-SCNC: 134 MMOL/L (ref 136–145)
WHOLE BLOOD HOLD SPECIMEN: NORMAL

## 2024-10-15 PROCEDURE — 63710000001 INSULIN LISPRO (HUMAN) PER 5 UNITS: Performed by: INTERNAL MEDICINE

## 2024-10-15 PROCEDURE — P9047 ALBUMIN (HUMAN), 25%, 50ML: HCPCS | Performed by: INTERNAL MEDICINE

## 2024-10-15 PROCEDURE — 25010000002 SULFUR HEXAFLUORIDE MICROSPH 60.7-25 MG RECONSTITUTED SUSPENSION: Performed by: INTERNAL MEDICINE

## 2024-10-15 PROCEDURE — 94799 UNLISTED PULMONARY SVC/PX: CPT

## 2024-10-15 PROCEDURE — 93306 TTE W/DOPPLER COMPLETE: CPT

## 2024-10-15 PROCEDURE — 25010000002 ALBUMIN HUMAN 25% PER 50 ML: Performed by: INTERNAL MEDICINE

## 2024-10-15 PROCEDURE — 82948 REAGENT STRIP/BLOOD GLUCOSE: CPT

## 2024-10-15 PROCEDURE — 63710000001 INSULIN GLARGINE PER 5 UNITS: Performed by: INTERNAL MEDICINE

## 2024-10-15 PROCEDURE — 87102 FUNGUS ISOLATION CULTURE: CPT | Performed by: INTERNAL MEDICINE

## 2024-10-15 PROCEDURE — 99223 1ST HOSP IP/OBS HIGH 75: CPT | Performed by: INTERNAL MEDICINE

## 2024-10-15 PROCEDURE — 94760 N-INVAS EAR/PLS OXIMETRY 1: CPT

## 2024-10-15 PROCEDURE — 80053 COMPREHEN METABOLIC PANEL: CPT | Performed by: INTERNAL MEDICINE

## 2024-10-15 PROCEDURE — 93306 TTE W/DOPPLER COMPLETE: CPT | Performed by: INTERNAL MEDICINE

## 2024-10-15 RX ORDER — ALBUMIN (HUMAN) 12.5 G/50ML
12.5 SOLUTION INTRAVENOUS ONCE
Status: COMPLETED | OUTPATIENT
Start: 2024-10-15 | End: 2024-10-15

## 2024-10-15 RX ORDER — IBUPROFEN 400 MG/1
600 TABLET, FILM COATED ORAL ONCE
Status: COMPLETED | OUTPATIENT
Start: 2024-10-15 | End: 2024-10-15

## 2024-10-15 RX ORDER — TRAMADOL HYDROCHLORIDE 50 MG/1
50 TABLET ORAL ONCE
Status: COMPLETED | OUTPATIENT
Start: 2024-10-15 | End: 2024-10-15

## 2024-10-15 RX ORDER — TRAMADOL HYDROCHLORIDE 50 MG/1
50 TABLET ORAL EVERY 6 HOURS PRN
Status: DISCONTINUED | OUTPATIENT
Start: 2024-10-15 | End: 2024-10-18

## 2024-10-15 RX ADMIN — SERTRALINE HYDROCHLORIDE 50 MG: 50 TABLET ORAL at 21:26

## 2024-10-15 RX ADMIN — SULFUR HEXAFLUORIDE 4 ML: KIT at 15:14

## 2024-10-15 RX ADMIN — EMPAGLIFLOZIN 10 MG: 10 TABLET, FILM COATED ORAL at 11:07

## 2024-10-15 RX ADMIN — OXYCODONE HYDROCHLORIDE AND ACETAMINOPHEN 1 TABLET: 7.5; 325 TABLET ORAL at 06:12

## 2024-10-15 RX ADMIN — MONTELUKAST 10 MG: 10 TABLET, FILM COATED ORAL at 21:26

## 2024-10-15 RX ADMIN — Medication 500 MG: at 11:08

## 2024-10-15 RX ADMIN — OXYCODONE HYDROCHLORIDE AND ACETAMINOPHEN 1 TABLET: 7.5; 325 TABLET ORAL at 15:26

## 2024-10-15 RX ADMIN — INSULIN LISPRO 3 UNITS: 100 INJECTION, SOLUTION INTRAVENOUS; SUBCUTANEOUS at 11:55

## 2024-10-15 RX ADMIN — SPIRONOLACTONE 25 MG: 25 TABLET, FILM COATED ORAL at 11:08

## 2024-10-15 RX ADMIN — PREGABALIN 25 MG: 25 CAPSULE ORAL at 11:07

## 2024-10-15 RX ADMIN — CETIRIZINE HYDROCHLORIDE 10 MG: 10 TABLET, FILM COATED ORAL at 11:08

## 2024-10-15 RX ADMIN — SPIRONOLACTONE 25 MG: 25 TABLET, FILM COATED ORAL at 18:12

## 2024-10-15 RX ADMIN — IBUPROFEN 600 MG: 400 TABLET, FILM COATED ORAL at 03:57

## 2024-10-15 RX ADMIN — DIGOXIN 125 MCG: 0.12 TABLET ORAL at 11:56

## 2024-10-15 RX ADMIN — BACLOFEN 10 MG: 10 TABLET ORAL at 15:37

## 2024-10-15 RX ADMIN — OXYCODONE HYDROCHLORIDE AND ACETAMINOPHEN 1 TABLET: 7.5; 325 TABLET ORAL at 23:35

## 2024-10-15 RX ADMIN — APIXABAN 5 MG: 5 TABLET, FILM COATED ORAL at 11:07

## 2024-10-15 RX ADMIN — ALBUMIN (HUMAN) 12.5 G: 0.25 INJECTION, SOLUTION INTRAVENOUS at 15:28

## 2024-10-15 RX ADMIN — PREGABALIN 25 MG: 25 CAPSULE ORAL at 15:26

## 2024-10-15 RX ADMIN — INSULIN GLARGINE 25 UNITS: 100 INJECTION, SOLUTION SUBCUTANEOUS at 11:04

## 2024-10-15 RX ADMIN — BACLOFEN 10 MG: 10 TABLET ORAL at 06:12

## 2024-10-15 RX ADMIN — INSULIN LISPRO 3 UNITS: 100 INJECTION, SOLUTION INTRAVENOUS; SUBCUTANEOUS at 21:25

## 2024-10-15 RX ADMIN — Medication 500 MG: at 21:26

## 2024-10-15 RX ADMIN — TRAMADOL HYDROCHLORIDE 50 MG: 50 TABLET, COATED ORAL at 18:53

## 2024-10-15 RX ADMIN — FAMOTIDINE 20 MG: 20 TABLET ORAL at 11:07

## 2024-10-15 RX ADMIN — INSULIN GLARGINE 22 UNITS: 100 INJECTION, SOLUTION SUBCUTANEOUS at 21:25

## 2024-10-15 RX ADMIN — BACLOFEN 10 MG: 10 TABLET ORAL at 23:35

## 2024-10-15 RX ADMIN — TRAMADOL HYDROCHLORIDE 50 MG: 50 TABLET, COATED ORAL at 11:55

## 2024-10-15 RX ADMIN — Medication 10 MG: at 21:27

## 2024-10-15 RX ADMIN — APIXABAN 5 MG: 5 TABLET, FILM COATED ORAL at 21:27

## 2024-10-15 RX ADMIN — ATORVASTATIN CALCIUM 10 MG: 10 TABLET, FILM COATED ORAL at 21:26

## 2024-10-15 RX ADMIN — PREGABALIN 25 MG: 25 CAPSULE ORAL at 21:26

## 2024-10-15 NOTE — PLAN OF CARE
PT note;     Pt has been assessed multiple times throughout his lengthy stay.  Please see prior evaluations for more detail.  He is not appropriate for skilled PT services. Recommend positioning to prevent pressures areas and should OOB activity be necessary please use a bariatric lift device for safety.  PT will sign off.

## 2024-10-15 NOTE — PLAN OF CARE
Goal Outcome Evaluation:  Plan of Care Reviewed With: patient        Progress: no change  Outcome Evaluation: Patient resting in bed, no cough or shortness of air, pain med requested for left hip pain, , patient is bedbound, patient alert and oriented, call light within reach.

## 2024-10-15 NOTE — PLAN OF CARE
Goal Outcome Evaluation:  Plan of Care Reviewed With: patient        Progress: no change  Outcome Evaluation: VSS.  Pt was continent today.  Demeanor was pleasant.  Pt had c/o of pain today.  BP ran low and BP meds were held.  Pt was alert and oriented.  Continue POC.

## 2024-10-15 NOTE — SIGNIFICANT NOTE
Wound Eval / Progress Noted     Angelica     Patient Name: Jose Shaikh  : 1960  MRN: [id number]  Today's Date: 10/15/2024                 Admit Date: 10/14/2024    Visit Dx:  No diagnosis found.      COVID-19 virus infection        Past Medical History:   Diagnosis Date    Absence of toe of right foot     Acute osteomyelitis of left calcaneus  2021    Anxiety and depression     Arthritis     Cancer     Chronic pain     STATES HIS PAIN IS 10/10 AAT    Claustrophobia     Corns and callus     Diabetic ulcer of left heel associated with type 2 DM 2021    Diabetic ulcer of left heel associated with type 2 DM 2021    Diabetic ulcer of right midfoot associated with type 2 DM 2021    Difficulty walking     Essential hypertension 2021    Hammertoe     Hyperlipidemia LDL goal <100 2021    Ingrown toenail     Obesity     Paroxysmal atrial fibrillation 2021    Polyneuropathy     Pressure ulcer, stage 1     Tinea unguium     Type 2 diabetes mellitus with polyneuropathy         Past Surgical History:   Procedure Laterality Date    CYST REMOVAL      center of back; benign    EYE SURGERY      INCISION AND DRAINAGE ABSCESS      back    INCISION AND DRAINAGE LEG Right 12/10/2021    Procedure: INCISION AND DRAINAGE LOWER EXTREMITY;  Surgeon: Ash Leyva DPM;  Location: [facility] MAIN OR;  Service: Podiatry;  Laterality: Right;    OTHER SURGICAL HISTORY      Surgical clips left foot    TOE SURGERY Right     Removal of 5th toe    TRANS METATARSAL AMPUTATION Right 2021    Procedure: AMPUTATION TRANS METATARSAL;  Surgeon: Ash Leyva DPM;  Location: [facility] MAIN OR;  Service: Podiatry;  Laterality: Right;    VASCULAR SURGERY      WRIST SURGERY Left     repair of injury         Physical Assessment:       10/15/24 1140   Skin   Skin WDL all;X   Skin Color/Characteristics ade;other (see comments)  (raised areas with hypergranulation to the bilateral lower legs.  CLIFFORD, RN, [facility])   Skin Temperature warm   Skin Moisture dry   Skin Elasticity quick return to original state   Skin Integrity intact    Right anterior foot     Left lower leg     Right lower leg      Wound Check / Follow-up:  Patient seen today for a wound consult. Patient is awake, alert and oriented at the time of assessment; agreeable to the wound check at this time.     Crusted tissue to the right anterior foot removed during cleansing, no open  wounds present. Full closure achieved. Recommending to provide quality skin care and hygiene with the application of purple top moisturizer.    Improvement present to the raised tissue to the bilateral lower legs; tissue remains intact and is responding to the topical steroid. Hypergranulation remains present to the bilateral lower legs. Will recommend to continue with quality skin care and hygiene while continuing the application of the topical steroid to the areas of hypergranulation.     Patient refused for further skin assessment at this time.     Impression: hyperpigmentation / hypergranulation to the lower legs    Short term goals:  regain skin integrity, skin protection, topical treatment    Karon Bolton RN    10/15/2024    15:09 EDT

## 2024-10-15 NOTE — H&P
[facility]   HOSPITALIST HISTORY AND PHYSICAL  Date: 10/15/2024   Patient Name: Jose Shaikh  : 1960  MRN: [id number]  Primary Care Physician:  Delia Cervantes, VALENTÍN  Date of admission: 10/14/2024    Subjective   Subjective     Chief Complaint: Positive COVID swab while on SNF at [facility]    HPI:    Jose Shaikh is a 63 y.o. male   initially hospitalized on 9/10/2023, prolonged hospitalization for treatment of management of generalized weakness, deconditioning, with acute issues of diarrhea, C. difficile negative.  Diarrhea improved.  Had small hematoma after ambulating on his foot.  Unfortunately with exhaustive efforts to try to place this gentleman after 39 days of hospitalization, we are unable to find a facility that will accept him.  He has no further acute needs or requirements for inpatient monitoring and management, his labs and vitals are stable, he is tolerating oral intake, and has been refusing turns and repositionings and other interventions with nursing staff despite recommendations.  Patient discharged in hemodynamically stable condition on 10/19/2023 to follow-up with PCP within 1 week. Unfortunately we cannot solve all of his social issues during this hospitalization due to lack of resources and participation from the patient's perspective despite all our efforts.  Patient has a financial means of making arrangements at home.  Patient appealed his discharge.  Lost his appeal.  LCD: 10/20/23, PFL beginning: 10/21/23 @ 12pm.  Due to an inability to place the patient in outside nursing home he has been placed in our skilled nursing facility until arrangements can be made or until he is able to care for himself.  Patient open to the idea of bariatric surgery for weight loss.   Patient was discharged and readmitted to SNF because of administrative reason due to EMR requirement as patient has been in SNF for about a year and needed to be re admitted.  Patient  weight on  October 2 was 330 pounds after diuresing with IV Bumex for few days.  It was 348 pounds on August 5.  Patient has been noncompliant with fluid restrictions and has been door Dashing soft drinks regularly.  Due to noncompliance with fluid restrictions IV Bumex was Dc'd.    On October 14 patient readmitted to acute care side as patient COVID swab came back positive on October 14.  His previous swab on October 10 was negative.  Patient denies any active symptoms like cough, shortness of air, runny stuffy nose, sinus congestion, loss of taste or smell.  Does have chronic diarrhea.  Due to SNF policy patient is transferred to acute side.  Paxlovid was considered but patient has too many heart medications interacting with hence it was not started.  Patient stable to not get remdesivir.  No need of Decadron as patient is not hypoxemic with 98% saturation on room air.  Patient main complaint is pain in his hips.    Personal History     Past Medical History:  Past Medical History:   Diagnosis Date    Absence of toe of right foot     Acute osteomyelitis of left calcaneus  08/18/2021    Anxiety and depression     Arthritis     Cancer     Chronic pain     STATES HIS PAIN IS 10/10 AAT    Claustrophobia     Corns and callus     Diabetic ulcer of left heel associated with type 2 DM 08/18/2021    Diabetic ulcer of left heel associated with type 2 DM 07/06/2021    Diabetic ulcer of right midfoot associated with type 2 DM 08/18/2021    Difficulty walking     Essential hypertension 08/31/2021    Hammertoe     Hyperlipidemia LDL goal <100 08/31/2021    Ingrown toenail     Obesity     Paroxysmal atrial fibrillation 08/31/2021    Polyneuropathy     Pressure ulcer, stage 1     Tinea unguium     Type 2 diabetes mellitus with polyneuropathy        Past Surgical History:  Past Surgical History:   Procedure Laterality Date    CYST REMOVAL      center of back; benign    EYE SURGERY      INCISION AND DRAINAGE ABSCESS      back    INCISION AND  DRAINAGE LEG Right 12/10/2021    Procedure: INCISION AND DRAINAGE LOWER EXTREMITY;  Surgeon: Ash Leyva DPM;  Location: [facility] MAIN OR;  Service: Podiatry;  Laterality: Right;    OTHER SURGICAL HISTORY      Surgical clips left foot    TOE SURGERY Right     Removal of 5th toe    TRANS METATARSAL AMPUTATION Right 12/02/2021    Procedure: AMPUTATION TRANS METATARSAL;  Surgeon: Ash Leyva DPM;  Location: [facility] MAIN OR;  Service: Podiatry;  Laterality: Right;    VASCULAR SURGERY      WRIST SURGERY Left     repair of injury       Family History:   family history includes Alcohol abuse in his nephew; Arthritis in his mother; COPD in his nephew; Cancer in his father and sister; Depression in his mother; Diabetes in his paternal grandmother; Drug abuse in his nephew; Hearing loss in his brother and mother; Heart disease in his brother, father, and mother; Learning disabilities in his nephew.    Social History:    reports that he quit smoking about 34 years ago. His smoking use included cigarettes. He has never used smokeless tobacco. He reports that he does not currently use alcohol. He reports that he does not currently use drugs after having used the following drugs: Marijuana.    Home Medications:  HYDROcodone-acetaminophen, Insulin Lispro (1 Unit Dial), Semaglutide (1 MG/DOSE), Semaglutide (2 MG/DOSE), Semaglutide(0.25 or 0.5MG/DOS), acetaminophen, apixaban, digoxin, insulin detemir, lisinopril, naloxone, pregabalin, and simvastatin    Allergies:  Allergies   Allergen Reactions    Adhesive Tape Rash       Review of Systems   All systems were reviewed and negative except for: H&P    Objective   Objective     Vitals:   Temp:  [97.3 °F (36.3 °C)-98.7 °F (37.1 °C)] 97.3 °F (36.3 °C)  Heart Rate:  [62-79] 73  Resp:  [20-22] 20  BP: ()/(56-64) 96/58    Physical Exam        Constitutional: No distress.  Alert and conversational.  Respiratory: No wheeze noted.  GI: Abdomen: Obese.  Indurated  edema lower abdominal  Neuro/psych:  Calm mood.  No dysarthria.  Extremities: chronic edema mike LE, R>L.  Right foot transmetatarsal amputation with dressing  Genitourinary.  No more scrotal swelling but penis still not visible         Result Review    Result Review:  I have personally reviewed the results from the time of this admission to 10/15/2024 13:05 EDT and agree with these findings:  [x]  Laboratory  [x]  Microbiology  []  Radiology  []  EKG/Telemetry   []  Cardiology/Vascular   []  Pathology  [x]  Old records  [x]  Other: Medications      Assessment & Plan   Assessment / Plan     Assessment:        Hospital-acquired weakness  Positive COVID in October 14th at [facility] SNF  Noncompliance  Hx of Influenza A  Hx of C. difficile diarrhea treated  Deconditioning  DM (Kami Garcia and PCP)  HTN  PAF (on Eliquis; previously seen Dr. Duval)  Morbid obesity with BMI 44  Debility with wheelchair dependence  Hx of severe left hip pain  Severe degenerative joint disease of lower extremities  Hx L calcaneus osteomyelitis  Chronic venous stasis  Hx R transmetatarsal amputation  Chronic bilateral foot wounds with right transmetatarsal amputation, healing by secondary intention (wound care clinic; podiatrist Gordon)  Thrombocytopenia  Conjunctivitis' resolved  Hemorrhoids  Anasarca.  Improving  Bedridden due to severe hip arthritis   CHF      Plan:   Low-salt diet.  Patient refuses to comply with fluid restriction and door Dashes frequently.  Patient also resistant to monitoring weight due to difficulty in getting up because of hip and knee pain  IV albumin x 1 for low blood pressure  Continue Jardiance, Bumex, Aldactone, digoxin, beta-blocker and ACE inhibitor as blood pressure permits.  Monitor kidney function and electrolytes on regular intervals  Basal and sliding scale insulin.  Nebulizer treatments.  Cholestyramine for diarrhea.  Statin.  No treatment for COVID.  Patient on too many heart medications  to have interaction with Paxlovid.  No indication for remdesivir or Decadron  Check 2D echo.  Discussed with cardiology to evaluate patient  As needed Percocet and tramadol for pain control  Patient has been released by PT OT as patient is at baseline and at times has been noncompliant .  Weight loss surgery was discussed with patient patient is thinking about it.  Ozempic was discontinued while in SNF due to noncompliance with diet  Multiple discussions with the patient over the past few months about the importance of compliance with diet and medical care, yet he continues to use these things routinely.  Anusol as needed for hemorrhoidal irritation.  Appreciate palliative care input.  Patient not interested at this time.  Patient has been evaluated by at least 3 different orthopedic surgeon including a referral to [facility], and all of them advised him that he must lose weight to be considered a candidate for surgery.  Per patient he was told to have a weight around 280 to be considered for surgery.  Patient at SNF had reported instances of disrespectful interaction with the staff.  Continue flex monitor  Discussed with RN and .  Patient to be discharged home when stable.  Over past year no skilled nursing facility has accepted him.  Patient has been at [facility] but they will not take him back.  Only option is to go home from here.             VTE Prophylaxis:  Pharmacologic & mechanical VTE prophylaxis orders are present.  Eliquis         CODE STATUS:    Code Status (Patient has no pulse and is not breathing): CPR (Attempt to Resuscitate)  Medical Interventions (Patient has pulse or is breathing): Full Support      Admission Status:  I believe this patient meets inpatient status.    Part of this note may be an electronic transcription/translation of spoken language to printed text using the Dragon Dictation System.     Electronically signed by Shekhar Au MD, 10/15/24, 1:05 PM  EDT.

## 2024-10-16 PROBLEM — U07.1 COVID-19: Status: ACTIVE | Noted: 2024-10-16

## 2024-10-16 LAB
GLUCOSE BLDC GLUCOMTR-MCNC: 127 MG/DL (ref 70–99)
GLUCOSE BLDC GLUCOMTR-MCNC: 152 MG/DL (ref 70–99)
GLUCOSE BLDC GLUCOMTR-MCNC: 192 MG/DL (ref 70–99)
GLUCOSE BLDC GLUCOMTR-MCNC: 271 MG/DL (ref 70–99)

## 2024-10-16 PROCEDURE — 63710000001 TRAMADOL 50 MG TABLET: Performed by: INTERNAL MEDICINE

## 2024-10-16 PROCEDURE — 96374 THER/PROPH/DIAG INJ IV PUSH: CPT

## 2024-10-16 PROCEDURE — A9270 NON-COVERED ITEM OR SERVICE: HCPCS | Performed by: INTERNAL MEDICINE

## 2024-10-16 PROCEDURE — 63710000001 SPIRONOLACTONE 25 MG TABLET: Performed by: INTERNAL MEDICINE

## 2024-10-16 PROCEDURE — 63710000001 ATORVASTATIN 10 MG TABLET: Performed by: INTERNAL MEDICINE

## 2024-10-16 PROCEDURE — 63710000001 MELATONIN 5 MG TABLET: Performed by: INTERNAL MEDICINE

## 2024-10-16 PROCEDURE — 25010000002 ONDANSETRON PER 1 MG: Performed by: INTERNAL MEDICINE

## 2024-10-16 PROCEDURE — 99215 OFFICE O/P EST HI 40 MIN: CPT | Performed by: INTERNAL MEDICINE

## 2024-10-16 PROCEDURE — 63710000001 BACLOFEN 10 MG TABLET: Performed by: INTERNAL MEDICINE

## 2024-10-16 PROCEDURE — 63710000001 MONTELUKAST 10 MG TABLET: Performed by: INTERNAL MEDICINE

## 2024-10-16 PROCEDURE — 63710000001 OXYCODONE-ACETAMINOPHEN 7.5-325 MG TABLET: Performed by: INTERNAL MEDICINE

## 2024-10-16 PROCEDURE — 63710000001 PREGABALIN 25 MG CAPSULE: Performed by: INTERNAL MEDICINE

## 2024-10-16 PROCEDURE — 63710000001 APIXABAN 5 MG TABLET: Performed by: INTERNAL MEDICINE

## 2024-10-16 PROCEDURE — 63710000001 SERTRALINE 50 MG TABLET: Performed by: INTERNAL MEDICINE

## 2024-10-16 PROCEDURE — 82948 REAGENT STRIP/BLOOD GLUCOSE: CPT

## 2024-10-16 PROCEDURE — 63710000001 INSULIN LISPRO (HUMAN) PER 5 UNITS: Performed by: INTERNAL MEDICINE

## 2024-10-16 PROCEDURE — 99233 SBSQ HOSP IP/OBS HIGH 50: CPT | Performed by: INTERNAL MEDICINE

## 2024-10-16 PROCEDURE — 63710000001 INSULIN GLARGINE PER 5 UNITS: Performed by: INTERNAL MEDICINE

## 2024-10-16 PROCEDURE — 63710000001 SACCHAROMYCES BOULARDII 250 MG CAPSULE: Performed by: INTERNAL MEDICINE

## 2024-10-16 PROCEDURE — 99204 OFFICE O/P NEW MOD 45 MIN: CPT | Performed by: INTERNAL MEDICINE

## 2024-10-16 RX ADMIN — Medication 500 MG: at 08:07

## 2024-10-16 RX ADMIN — CETIRIZINE HYDROCHLORIDE 10 MG: 10 TABLET, FILM COATED ORAL at 08:07

## 2024-10-16 RX ADMIN — APIXABAN 5 MG: 5 TABLET, FILM COATED ORAL at 21:46

## 2024-10-16 RX ADMIN — BACLOFEN 10 MG: 10 TABLET ORAL at 15:49

## 2024-10-16 RX ADMIN — BACLOFEN 10 MG: 10 TABLET ORAL at 07:41

## 2024-10-16 RX ADMIN — PREGABALIN 25 MG: 25 CAPSULE ORAL at 08:08

## 2024-10-16 RX ADMIN — OXYCODONE HYDROCHLORIDE AND ACETAMINOPHEN 1 TABLET: 7.5; 325 TABLET ORAL at 21:49

## 2024-10-16 RX ADMIN — SPIRONOLACTONE 25 MG: 25 TABLET, FILM COATED ORAL at 18:05

## 2024-10-16 RX ADMIN — ATORVASTATIN CALCIUM 10 MG: 10 TABLET, FILM COATED ORAL at 21:47

## 2024-10-16 RX ADMIN — OXYCODONE HYDROCHLORIDE AND ACETAMINOPHEN 1 TABLET: 7.5; 325 TABLET ORAL at 15:49

## 2024-10-16 RX ADMIN — SPIRONOLACTONE 25 MG: 25 TABLET, FILM COATED ORAL at 08:08

## 2024-10-16 RX ADMIN — BUMETANIDE 2 MG: 1 TABLET ORAL at 08:08

## 2024-10-16 RX ADMIN — HYDROCORTISONE ACETATE 1 APPLICATION: 1 CREAM TOPICAL at 12:20

## 2024-10-16 RX ADMIN — TRAMADOL HYDROCHLORIDE 50 MG: 50 TABLET, COATED ORAL at 03:38

## 2024-10-16 RX ADMIN — INSULIN LISPRO 3 UNITS: 100 INJECTION, SOLUTION INTRAVENOUS; SUBCUTANEOUS at 18:05

## 2024-10-16 RX ADMIN — TRAMADOL HYDROCHLORIDE 50 MG: 50 TABLET, COATED ORAL at 19:37

## 2024-10-16 RX ADMIN — METOPROLOL SUCCINATE 12.5 MG: 25 TABLET, FILM COATED, EXTENDED RELEASE ORAL at 08:08

## 2024-10-16 RX ADMIN — EMPAGLIFLOZIN 10 MG: 10 TABLET, FILM COATED ORAL at 08:08

## 2024-10-16 RX ADMIN — INSULIN GLARGINE 25 UNITS: 100 INJECTION, SOLUTION SUBCUTANEOUS at 08:06

## 2024-10-16 RX ADMIN — INSULIN GLARGINE 22 UNITS: 100 INJECTION, SOLUTION SUBCUTANEOUS at 21:45

## 2024-10-16 RX ADMIN — FAMOTIDINE 20 MG: 20 TABLET ORAL at 08:07

## 2024-10-16 RX ADMIN — APIXABAN 5 MG: 5 TABLET, FILM COATED ORAL at 08:07

## 2024-10-16 RX ADMIN — ONDANSETRON 4 MG: 2 INJECTION INTRAMUSCULAR; INTRAVENOUS at 12:16

## 2024-10-16 RX ADMIN — Medication 10 MG: at 21:47

## 2024-10-16 RX ADMIN — BACLOFEN 10 MG: 10 TABLET ORAL at 21:49

## 2024-10-16 RX ADMIN — LISINOPRIL 2.5 MG: 5 TABLET ORAL at 08:08

## 2024-10-16 RX ADMIN — OXYCODONE HYDROCHLORIDE AND ACETAMINOPHEN 1 TABLET: 7.5; 325 TABLET ORAL at 07:41

## 2024-10-16 RX ADMIN — PREGABALIN 25 MG: 25 CAPSULE ORAL at 21:46

## 2024-10-16 RX ADMIN — MONTELUKAST 10 MG: 10 TABLET, FILM COATED ORAL at 21:47

## 2024-10-16 RX ADMIN — INSULIN LISPRO 8 UNITS: 100 INJECTION, SOLUTION INTRAVENOUS; SUBCUTANEOUS at 21:45

## 2024-10-16 RX ADMIN — INSULIN LISPRO 3 UNITS: 100 INJECTION, SOLUTION INTRAVENOUS; SUBCUTANEOUS at 12:16

## 2024-10-16 RX ADMIN — Medication 500 MG: at 21:46

## 2024-10-16 RX ADMIN — DIGOXIN 125 MCG: 0.12 TABLET ORAL at 12:16

## 2024-10-16 RX ADMIN — PREGABALIN 25 MG: 25 CAPSULE ORAL at 15:49

## 2024-10-16 RX ADMIN — SERTRALINE HYDROCHLORIDE 50 MG: 50 TABLET ORAL at 21:46

## 2024-10-16 NOTE — NURSING NOTE
Spoke to Mr. Shaikh about his discharge needs to go home to his apartment, he expressed needs for someone to help with ADL's such as being cleaned up, cooking, bathing as well as asking for PT and OT

## 2024-10-16 NOTE — PLAN OF CARE
Goal Outcome Evaluation:  Plan of Care Reviewed With: patient        Progress: improving  Outcome Evaluation: VSS. Pt was Alert and Oriented x 4.  Pt was pleasant today.  BP was stable today and BP were able to be given.  Pt was continent today with verbal requests for help.  No major changes to report. Continue POC.

## 2024-10-16 NOTE — CONSULTS
Cardiology Consult Note  [address]          Patient Identification:  Jose Shaikh      [id number]  63 y.o.        male  1960           Reason for Consultation: Lower extremity edema possible CHF  PCP: Delia Cervantes APRN    History of Present Illness:     Patient is a 63-year-old with a previous history of dyslipidemia, paroxysmal atrial fibrillation, diabetes type 2, previous C. difficile who had had a prolonged hospital stay and currently been in SNF for the last year had clinically been doing stable there but had tested positive for COVID and been transferred here was not able to start on Paxlovid due to drug interactions.  The patient himself reports stable lower extremity edema problems as well as generalized weakness no problems with increased shortness of breath    Past History:  Past Medical History:   Diagnosis Date    Absence of toe of right foot     Acute osteomyelitis of left calcaneus  08/18/2021    Anxiety and depression     Arthritis     Cancer     Chronic pain     STATES HIS PAIN IS 10/10 AAT    Claustrophobia     Corns and callus     Diabetic ulcer of left heel associated with type 2 DM 08/18/2021    Diabetic ulcer of left heel associated with type 2 DM 07/06/2021    Diabetic ulcer of right midfoot associated with type 2 DM 08/18/2021    Difficulty walking     Essential hypertension 08/31/2021    Hammertoe     Hyperlipidemia LDL goal <100 08/31/2021    Ingrown toenail     Obesity     Paroxysmal atrial fibrillation 08/31/2021    Polyneuropathy     Pressure ulcer, stage 1     Tinea unguium     Type 2 diabetes mellitus with polyneuropathy      Past Surgical History:   Procedure Laterality Date    CYST REMOVAL      center of back; benign    EYE SURGERY      INCISION AND DRAINAGE ABSCESS      back    INCISION AND DRAINAGE LEG Right 12/10/2021    Procedure: INCISION AND DRAINAGE LOWER EXTREMITY;  Surgeon: Ash Leyva DPM;  Location: [facility] OR;  Service:  Podiatry;  Laterality: Right;    OTHER SURGICAL HISTORY      Surgical clips left foot    TOE SURGERY Right     Removal of 5th toe    TRANS METATARSAL AMPUTATION Right 2021    Procedure: AMPUTATION TRANS METATARSAL;  Surgeon: Ash Leyva DPM;  Location: [facility] MAIN OR;  Service: Podiatry;  Laterality: Right;    VASCULAR SURGERY      WRIST SURGERY Left     repair of injury     Allergies   Allergen Reactions    Adhesive Tape Rash     Social History     Socioeconomic History    Marital status:    Tobacco Use    Smoking status: Former     Current packs/day: 0.00     Types: Cigarettes     Quit date: 1990     Years since quittin.1    Smokeless tobacco: Never    Tobacco comments:     quit at age 30   Vaping Use    Vaping status: Never Used   Substance and Sexual Activity    Alcohol use: Not Currently     Comment: rare    Drug use: Not Currently     Types: Marijuana     Comment: occasionally, EVERY 3-4 MONTH    Sexual activity: Defer     Family History   Problem Relation Age of Onset    Hearing loss Mother     Depression Mother     Arthritis Mother     Heart disease Mother     Heart disease Father     Cancer Father         Unspecified    Cancer Sister     Hearing loss Brother     Heart disease Brother     Diabetes Paternal Grandmother     Learning disabilities Nephew     Drug abuse Nephew     COPD Nephew     Alcohol abuse Nephew        Medications:  Prior to Admission medications    Medication Sig Start Date End Date Taking? Authorizing Provider   acetaminophen (TYLENOL) 650 MG 8 hr tablet Take 1 tablet by mouth Every 8 (Eight) Hours As Needed for Mild Pain.    Provider, MD Laura   apixaban (ELIQUIS) 5 MG tablet tablet Take 1 tablet by mouth Every 12 (Twelve) Hours. 23   Delia Cervantes APRN   digoxin (LANOXIN) 125 MCG tablet Take 1 tablet by mouth Daily for 30 days. 6/15/23 9/10/23  Joe Haynes MD   HumaLOG KwikPen 100 UNIT/ML solution pen-injector INJECT 30 UNITS UNDER THE  SKIN INTO THE APPROPRIATE AREA AS DIRECTED 3 (THREE) TIMES A DAY BEFORE MEALS.  Patient taking differently: Inject 30 Units under the skin into the appropriate area as directed 3 (Three) Times a Day Before Meals. 7/19/23   Kami Garcia APRN   HYDROcodone-acetaminophen (NORCO) 7.5-325 MG per tablet Take 1 tablet by mouth Every 4 (Four) Hours As Needed. 9/8/23   Laura Connor MD   insulin detemir (Levemir FlexPen) 100 UNIT/ML injection Inject 40 Units under the skin into the appropriate area as directed 2 (Two) Times a Day for 30 days.  Patient taking differently: Inject 60 Units under the skin into the appropriate area as directed 2 (Two) Times a Day. 6/23/23 9/10/23  Cailin Acosta MD   lisinopril (PRINIVIL,ZESTRIL) 2.5 MG tablet Take 1 tablet by mouth Daily for 30 days. 10/20/23 11/19/23  Kamron Lawrence PA   naloxone (NARCAN) 4 MG/0.1ML nasal spray Call 911. Don't prime. [place] in 1 nostril for overdose. Repeat in 2-3 minutes in other nostril if no or minimal breathing/responsiveness. 6/15/23   Joe Haynes MD   pregabalin (LYRICA) 25 MG capsule Take 1 capsule by mouth 3 (Three) Times a Day for 3 days. 6/15/23 9/10/23  Joe Haynes MD   Semaglutide, 1 MG/DOSE, (Ozempic, 1 MG/DOSE,) 4 MG/3ML solution pen-injector Inject 1 mg under the skin into the appropriate area as directed Every 7 (Seven) Days. 4/17/24   Omar Asher PA   Semaglutide, 1 MG/DOSE, (Ozempic, 1 MG/DOSE,) 4 MG/3ML solution pen-injector Inject 1 mg under the skin into the appropriate area once weekly as directed. 6/5/24   Kamron Lawrence PA   Semaglutide, 2 MG/DOSE, (Ozempic, 2 MG/DOSE,) 8 MG/3ML solution pen-injector Inject 2 mg under the skin into the appropriate area as directed Every 7 (Seven) Days. 7/15/24   Omar Asher PA   Semaglutide,0.25 or 0.5MG/DOS, (Ozempic, 0.25 or 0.5 MG/DOSE,) 2 MG/3ML solution pen-injector Inject 0.5 mg under the skin into the appropriate area as directed Every 7 (Seven) Days.  3/8/24   Omar Asher PA   simvastatin (ZOCOR) 20 MG tablet Take 1 tablet by mouth Every Night. 1/27/23   Delia Cervantes, VALENTÍN      Current medications:  !Patient Home Medications Stored in Pharmacy, , Does not apply, BID  !Patient Home Medications Stored on Unit, , Does not apply, BID  apixaban, 5 mg, Oral, Q12H  atorvastatin, 10 mg, Oral, Nightly  bumetanide, 2 mg, Oral, Daily  cetirizine, 10 mg, Oral, Daily  cholestyramine light, 1 packet, Oral, Daily  Diclofenac Sodium, 4 g, Topical, Q6H  digoxin, 125 mcg, Oral, Daily  empagliflozin, 10 mg, Oral, Daily  famotidine, 20 mg, Oral, Daily  hydrocortisone, 1 Application, Topical, Daily  insulin glargine, 22 Units, Subcutaneous, Nightly  insulin glargine, 25 Units, Subcutaneous, Daily  insulin lispro, 3-14 Units, Subcutaneous, 4x Daily AC & at Bedtime  lisinopril, 2.5 mg, Oral, Q24H  melatonin, 10 mg, Oral, Nightly  metoprolol succinate XL, 12.5 mg, Oral, Q24H  montelukast, 10 mg, Oral, Nightly  pregabalin, 25 mg, Oral, TID  saccharomyces boulardii, 500 mg, Oral, BID  sertraline, 50 mg, Oral, Nightly  spironolactone, 25 mg, Oral, BID      Current IV drips:       Review of Systems   Constitutional: Negative for chills, fever and weight loss.   HENT:  Negative for congestion and nosebleeds.    Cardiovascular:  Positive for leg swelling. Negative for orthopnea and paroxysmal nocturnal dyspnea.   Respiratory:  Negative for cough and shortness of breath.    Endocrine: Negative for cold intolerance and heat intolerance.   Skin:  Negative for rash.   Musculoskeletal:  Negative for back pain and muscle weakness.   Gastrointestinal:  Negative for abdominal pain, nausea and vomiting.   Genitourinary:  Negative for dysuria and nocturia.   Neurological:  Negative for dizziness and light-headedness.   Psychiatric/Behavioral:  Negative for altered mental status and hallucinations.          Physical Exam    /60 (BP Location: Right arm, Patient Position: Lying)   Pulse 69   " Temp 98 °F (36.7 °C) (Oral)   Resp 18   Ht 188 cm (74.02\")   SpO2 92%   BMI 43.83 kg/m²  Body mass index is 43.83 kg/m².   Oxygen saturation   @FLOWAN(10::1)@ SpO2  Min: 92 %  Max: 98 %    General Appearance:   no acute distress  Alert and oriented x3  HENT:   lips not cyanotic  Atraumatic  Neck:  thyroid not enlarged  supple  Respiratory:  no respiratory distress  normal breath sounds  no rales  Cardiovascular:  no jugular venous distention  regular rhythm  apical impulse normal  S1 normal, S2 normal  no S3, no S4   no murmur  no rub, no thrill  no carotid bruit  pedal pulses normal  lower extremity edema: +1 with amputations of some of his toes     gastrointestinal:   bowel sounds normal  non-tender  no hepatomegaly, no splenomegaly  Musculoskeletal:  no clubbing of fingers.   normocephalic, head atraumatic  Skin:   warm, dry  No rashes  Neuro/Psychiatric:  normal mood and affect  judgement and insight appropriate      Cardiographics:      Results for orders placed during the hospital encounter of 10/14/24    Adult Transthoracic Echo Complete W/ Cont if Necessary Per Protocol    Interpretation Summary    Left ventricular systolic function is normal. Left ventricular ejection fraction appears to be 61 - 65%.    Left ventricular wall thickness is consistent with borderline concentric hypertrophy.    Left ventricular diastolic function is consistent with (grade I) impaired relaxation.    There are no hemodynamically significant valvular abnormalities.    This is a technically difficult study.  Contrast used for better endocardial definition.         No results found for this or any previous visit.     Cardiolite (Tc-99m Sestamibi) stress test                  Lab Review:       CBC          8/18/2024    04:36 8/28/2024    04:43 9/15/2024    13:56   CBC   WBC 6.01  4.00  4.51    RBC 3.69  3.64  3.89    Hemoglobin 10.0  9.7  10.2    Hematocrit 31.5  31.3  33.0    MCV 85.4  86.0  84.8    MCH 27.1  26.6  26.2  " "  MCHC 31.7  31.0  30.9    RDW 15.1  14.6  15.3    Platelets 116  93  100        CMP          10/6/2024    04:47 10/10/2024    05:16 10/15/2024    05:34   CMP   Glucose 184  81  145    BUN 28  19  22    Creatinine 0.62  0.66  0.75    EGFR 106.1  104.1  100.1    Sodium 134  136  134    Potassium 4.6  4.5  4.3    Chloride 100  104  103    Calcium 7.9  8.2  8.1    Total Protein   5.9    Albumin 2.6  2.8  2.9    Globulin   3.0    Total Bilirubin   0.5    Alkaline Phosphatase   197    AST (SGOT)   48    ALT (SGPT)   38    Albumin/Globulin Ratio   1.0    BUN/Creatinine Ratio 45.2  28.8  29.3    Anion Gap 10.3  7.2  6.6         CARDIAC LABS:       Lab Results   Component Value Date    DIGOXIN 0.44 (L) 09/10/2023      Lab Results   Component Value Date    TSH 2.050 08/23/2024           Invalid input(s): \"LDLCALC\"  No results found for: \"POCTROP\"  No results found for: \"DDIMERQUAN\"  Lab Results   Component Value Date    MG 2.2 09/28/2024             CARDIAC LABS:         Assessment:    COVID-19 virus infection      Lower extremity edema question of possible CHF echocardiogram shows no LV dysfunction more difficult to ascertain if any diastolic issues that could be contributing certainly he does have edema in his extremities but much less evidence of any underlying CHF otherwise either clinically, radiographically or with proBNP levels.  Certainly his edema may be contributed due to his low albumin levels do not clinically feel patient would benefit from trying to diurese intensely especially given his borderline low blood pressures.  Can see how he does on his p.o. Bumex from a blood pressure and symptom standpoint but would not recommend consideration for any other diastolic CHF medications at this      Thank you for allowing us to share in Jose Shaikh   care.            Jose Duval MD   10/16/2024    06:34 EDT    "

## 2024-10-16 NOTE — PROGRESS NOTES
[facility]   Hospitalist Progress Note  Date: 10/16/2024  Patient Name: Jose Shaikh  : 1960  MRN: [id number]  Date of admission: 10/14/2024  Consultants:   -Cardiology: Dr. Jose Duval    Subjective   Subjective     Chief Complaint: COVID-19 infection    Summary:   Jose Shaikh is a 63 y.o. male who was transferred to [facility] acute care after testing positive for COVID-19 at [facility].  Patient was initially hospitalized on 09/10/2023 and had a prolonged hospitalization for treatment and management of generalized weakness, deconditioning acute issues of diarrhea.  Exhaustive efforts were done and attempt to find a facility that would accept patient after discharge, however, these were unsuccessful.  Patient had no further acute needs or requirements for inpatient monitoring and management and he was hemodynamically stable and so patient was eventually transferred to [facility].  Patient's progress has been complicated by his noncompliance with therapy and unwillingness to work with physical therapy/Occupational Therapy.  During his stay patient has been treated with IV diuresis and he had also been started on Ozempic therapy to aid in weight loss so patient could possibly pursue joint placement therapy.  Patient evaluated by 3 different orthopedic surgeons including referral to [facility] and all of them advised that the patient need to continue losing weight in order to be considered for surgery and patient need to have a weight of around 280 pounds.  Patient continually ordered door Dash food while in [facility], did not work well with skilled therapy services and was consistently noncompliant with medical recommendations.  Patient tested positive for COVID-19 on 10/14/2024 and per [facility] policy patient was transferred to acute care at [facility].  Patient was not hypoxic and was asymptomatic from COVID-19, no  therapies for COVID-19 were initiated.    Interval Followup:   No acute events overnight.  Patient denies chest pain or shortness of breath.  Nursing with no additional acute issues to report.  Echocardiogram was obtained and patient with normal EF and grade 1 diastolic dysfunction noted.    Pain Medication:   -Percocet    Objective   Objective     Vitals:   Temp:  [97.3 °F (36.3 °C)-98.2 °F (36.8 °C)] 98.2 °F (36.8 °C)  Heart Rate:  [64-75] 75  Resp:  [16-20] 16  BP: ()/(54-64) 106/56  Physical Exam   Gen: No acute distress, obese male, conversant, sitting up in bed  Resp: CTAB, No w/r/r, No respiratory distress appreciated  Card: RRR, No m/r/g  Abd: Soft, Nontender, Nondistended, + bowel sounds    Result Review    Result Review:  I have personally reviewed the results as below and agree with these findings:  []  Laboratory:   CMP          10/6/2024    04:47 10/10/2024    05:16 10/15/2024    05:34   CMP   Glucose 184  81  145    BUN 28  19  22    Creatinine 0.62  0.66  0.75    EGFR 106.1  104.1  100.1    Sodium 134  136  134    Potassium 4.6  4.5  4.3    Chloride 100  104  103    Calcium 7.9  8.2  8.1    Total Protein   5.9    Albumin 2.6  2.8  2.9    Globulin   3.0    Total Bilirubin   0.5    Alkaline Phosphatase   197    AST (SGOT)   48    ALT (SGPT)   38    Albumin/Globulin Ratio   1.0    BUN/Creatinine Ratio 45.2  28.8  29.3    Anion Gap 10.3  7.2  6.6      CBC          8/18/2024    04:36 8/28/2024    04:43 9/15/2024    13:56   CBC   WBC 6.01  4.00  4.51    RBC 3.69  3.64  3.89    Hemoglobin 10.0  9.7  10.2    Hematocrit 31.5  31.3  33.0    MCV 85.4  86.0  84.8    MCH 27.1  26.6  26.2    MCHC 31.7  31.0  30.9    RDW 15.1  14.6  15.3    Platelets 116  93  100      []  Microbiology:   []  Radiology:   []  EKG/Telemetry:    []  Cardiology/Vascular:    []  Pathology:  []  Old records:  []  Other:    Assessment & Plan   Assessment / Plan     Assessment:  COVID-19 infection  Morbid obesity (BMI: 43)  Medical  noncompliance  Chronic diastolic heart failure, not acutely exacerbated  Deconditioning  Type 2 diabetes mellitus  Chronic paroxysmal atrial fibrillation  Essential hypertension  Debility  Severe degenerative joint disease of lower extremities  Chronic venous stasis  Chronic bilateral foot wounds  Thrombocytopenia  Anasarca    Plan:  -Cardiology consulted and following, appreciate assistance and recommendations in the care of this patient.  -Continue low-salt diet.  Patient noncompliant with diet restrictions and fluid restriction.  Orders outside food frequently.  Patient also is resistant to monitoring his weight due to difficulty standing  -Patient asymptomatic from COVID-19.  No indication for any treatment for COVID-19 at this time.  -Pain management with as needed Percocet.  No plan to change pain regimen at this time.  -Continue Jardiance, Bumex, Aldactone, digoxin, beta-blocker and ACE inhibitor  -Continue apixaban  -Echocardiogram reviewed as noted above.  No acute interventions necessary at this time.  -Labs stable on review.  No indication for continued lab monitoring unless there is an acute change in the patient's condition  -Social work/case management closely involved in patient's care.  -Clinical course will dictate further management     DVT Prophylaxis: Apixaban  GI Prophylaxis: Famotidine  Diet:   Diet Order   Procedures    Diet: Diabetic Diets, High Protein Diet, Cardiac; Low Sodium (2g); Texture: Regular Texture (IDDSI 7); Fluid Consistency: Thin (IDDSI 0)     Dispo: Case management/social work actively involved in patient care     Time spent personally reviewing patient's chart, labs and imaging, evaluating/examining the patient, discussing care plan with patient and nurse at bedside and discussing management with consultants (cardiology): 45 minutes.     Part of this note may be an electronic transcription/translation of spoken language to printed text using the Dragon dictation system.    VTE  Prophylaxis:  Pharmacologic & mechanical VTE prophylaxis orders are present.        CODE STATUS:   Code Status (Patient has no pulse and is not breathing): CPR (Attempt to Resuscitate)  Medical Interventions (Patient has pulse or is breathing): Full Support        Electronically signed by Joe Haynes MD, 10/16/2024, 10:51 EDT.

## 2024-10-17 LAB
GLUCOSE BLDC GLUCOMTR-MCNC: 113 MG/DL (ref 70–99)
GLUCOSE BLDC GLUCOMTR-MCNC: 146 MG/DL (ref 70–99)
GLUCOSE BLDC GLUCOMTR-MCNC: 197 MG/DL (ref 70–99)
GLUCOSE BLDC GLUCOMTR-MCNC: 233 MG/DL (ref 70–99)

## 2024-10-17 PROCEDURE — 63710000001 INSULIN GLARGINE PER 5 UNITS: Performed by: INTERNAL MEDICINE

## 2024-10-17 PROCEDURE — A9270 NON-COVERED ITEM OR SERVICE: HCPCS | Performed by: INTERNAL MEDICINE

## 2024-10-17 PROCEDURE — 63710000001 APIXABAN 5 MG TABLET: Performed by: INTERNAL MEDICINE

## 2024-10-17 PROCEDURE — 63710000001 PREGABALIN 25 MG CAPSULE: Performed by: INTERNAL MEDICINE

## 2024-10-17 PROCEDURE — 63710000001 MELATONIN 5 MG TABLET: Performed by: INTERNAL MEDICINE

## 2024-10-17 PROCEDURE — 63710000001 DIGOXIN 125 MCG TABLET: Performed by: INTERNAL MEDICINE

## 2024-10-17 PROCEDURE — 63710000001 EMPAGLIFLOZIN 10 MG TABLET: Performed by: INTERNAL MEDICINE

## 2024-10-17 PROCEDURE — 63710000001 BACLOFEN 10 MG TABLET: Performed by: INTERNAL MEDICINE

## 2024-10-17 PROCEDURE — 63710000001 TRAMADOL 50 MG TABLET: Performed by: INTERNAL MEDICINE

## 2024-10-17 PROCEDURE — 63710000001 FAMOTIDINE 20 MG TABLET: Performed by: INTERNAL MEDICINE

## 2024-10-17 PROCEDURE — 63710000001 OXYCODONE-ACETAMINOPHEN 7.5-325 MG TABLET: Performed by: INTERNAL MEDICINE

## 2024-10-17 PROCEDURE — 63710000001 INSULIN LISPRO (HUMAN) PER 5 UNITS: Performed by: INTERNAL MEDICINE

## 2024-10-17 PROCEDURE — 63710000001 ATORVASTATIN 10 MG TABLET: Performed by: INTERNAL MEDICINE

## 2024-10-17 PROCEDURE — 97165 OT EVAL LOW COMPLEX 30 MIN: CPT

## 2024-10-17 PROCEDURE — 63710000001 CETIRIZINE 10 MG TABLET: Performed by: INTERNAL MEDICINE

## 2024-10-17 PROCEDURE — 99215 OFFICE O/P EST HI 40 MIN: CPT | Performed by: INTERNAL MEDICINE

## 2024-10-17 PROCEDURE — 63710000001 SERTRALINE 50 MG TABLET: Performed by: INTERNAL MEDICINE

## 2024-10-17 PROCEDURE — 63710000001 SPIRONOLACTONE 25 MG TABLET: Performed by: INTERNAL MEDICINE

## 2024-10-17 PROCEDURE — 63710000001 SACCHAROMYCES BOULARDII 250 MG CAPSULE: Performed by: INTERNAL MEDICINE

## 2024-10-17 PROCEDURE — 82948 REAGENT STRIP/BLOOD GLUCOSE: CPT

## 2024-10-17 PROCEDURE — 63710000001 MONTELUKAST 10 MG TABLET: Performed by: INTERNAL MEDICINE

## 2024-10-17 RX ORDER — SPIRONOLACTONE 25 MG/1
25 TABLET ORAL DAILY
Status: DISCONTINUED | OUTPATIENT
Start: 2024-10-17 | End: 2024-10-18

## 2024-10-17 RX ADMIN — INSULIN GLARGINE 25 UNITS: 100 INJECTION, SOLUTION SUBCUTANEOUS at 09:49

## 2024-10-17 RX ADMIN — BACLOFEN 10 MG: 10 TABLET ORAL at 06:19

## 2024-10-17 RX ADMIN — OXYCODONE HYDROCHLORIDE AND ACETAMINOPHEN 1 TABLET: 7.5; 325 TABLET ORAL at 23:02

## 2024-10-17 RX ADMIN — PREGABALIN 25 MG: 25 CAPSULE ORAL at 20:44

## 2024-10-17 RX ADMIN — BACLOFEN 10 MG: 10 TABLET ORAL at 15:05

## 2024-10-17 RX ADMIN — INSULIN LISPRO 8 UNITS: 100 INJECTION, SOLUTION INTRAVENOUS; SUBCUTANEOUS at 17:35

## 2024-10-17 RX ADMIN — FAMOTIDINE 20 MG: 20 TABLET ORAL at 09:48

## 2024-10-17 RX ADMIN — MONTELUKAST 10 MG: 10 TABLET, FILM COATED ORAL at 20:44

## 2024-10-17 RX ADMIN — Medication 10 MG: at 20:44

## 2024-10-17 RX ADMIN — INSULIN LISPRO 3 UNITS: 100 INJECTION, SOLUTION INTRAVENOUS; SUBCUTANEOUS at 22:35

## 2024-10-17 RX ADMIN — BACLOFEN 10 MG: 10 TABLET ORAL at 23:02

## 2024-10-17 RX ADMIN — DICLOFENAC SODIUM 4 G: 10 GEL TOPICAL at 09:53

## 2024-10-17 RX ADMIN — OXYCODONE HYDROCHLORIDE AND ACETAMINOPHEN 1 TABLET: 7.5; 325 TABLET ORAL at 15:05

## 2024-10-17 RX ADMIN — PREGABALIN 25 MG: 25 CAPSULE ORAL at 15:05

## 2024-10-17 RX ADMIN — INSULIN GLARGINE 25 UNITS: 100 INJECTION, SOLUTION SUBCUTANEOUS at 22:35

## 2024-10-17 RX ADMIN — EMPAGLIFLOZIN 10 MG: 10 TABLET, FILM COATED ORAL at 09:49

## 2024-10-17 RX ADMIN — TRAMADOL HYDROCHLORIDE 50 MG: 50 TABLET, COATED ORAL at 11:36

## 2024-10-17 RX ADMIN — APIXABAN 5 MG: 5 TABLET, FILM COATED ORAL at 20:44

## 2024-10-17 RX ADMIN — DIGOXIN 125 MCG: 0.12 TABLET ORAL at 11:36

## 2024-10-17 RX ADMIN — OXYCODONE HYDROCHLORIDE AND ACETAMINOPHEN 1 TABLET: 7.5; 325 TABLET ORAL at 06:18

## 2024-10-17 RX ADMIN — PREGABALIN 25 MG: 25 CAPSULE ORAL at 09:49

## 2024-10-17 RX ADMIN — Medication 500 MG: at 09:49

## 2024-10-17 RX ADMIN — HYDROCORTISONE ACETATE 1 APPLICATION: 1 CREAM TOPICAL at 11:40

## 2024-10-17 RX ADMIN — ATORVASTATIN CALCIUM 10 MG: 10 TABLET, FILM COATED ORAL at 20:44

## 2024-10-17 RX ADMIN — SPIRONOLACTONE 25 MG: 25 TABLET, FILM COATED ORAL at 09:49

## 2024-10-17 RX ADMIN — TRAMADOL HYDROCHLORIDE 50 MG: 50 TABLET, COATED ORAL at 02:11

## 2024-10-17 RX ADMIN — CETIRIZINE HYDROCHLORIDE 10 MG: 10 TABLET, FILM COATED ORAL at 09:48

## 2024-10-17 RX ADMIN — TRAMADOL HYDROCHLORIDE 50 MG: 50 TABLET, COATED ORAL at 20:44

## 2024-10-17 RX ADMIN — Medication 500 MG: at 20:44

## 2024-10-17 RX ADMIN — APIXABAN 5 MG: 5 TABLET, FILM COATED ORAL at 09:49

## 2024-10-17 RX ADMIN — SERTRALINE HYDROCHLORIDE 50 MG: 50 TABLET ORAL at 20:44

## 2024-10-17 NOTE — PLAN OF CARE
Goal Outcome Evaluation:  Plan of Care Reviewed With: patient        Progress: no change  Outcome Evaluation: Patient is currently bedbound with no further therapy recommended at this time.  Patient will do well at home but will need caregivers for toileting and bathing activities as well as provide meals for him as he is nonambulatory.  Would recommend home health services should patient return home.    Anticipated Discharge Disposition (OT): home with assist, home with home health

## 2024-10-17 NOTE — PLAN OF CARE
Goal Outcome Evaluation:  Plan of Care Reviewed With: patient        Progress: no change     BP soft patient asymptomatic, medicated for pain with around the clock dosing

## 2024-10-17 NOTE — PROGRESS NOTES
[facility]   Hospitalist Progress Note  Date: 10/17/2024  Patient Name: Jose Shaikh  : 1960  MRN: [id number]  Date of admission: 10/14/2024  Consultants:   -Cardiology: Dr. Jose Duval    Subjective   Subjective     Chief Complaint: COVID-19 infection    Summary:   Jose Shaikh is a 63 y.o. male who was transferred to [facility] acute care after testing positive for COVID-19 at [facility].  Patient was initially hospitalized on 09/10/2023 and had a prolonged hospitalization for treatment and management of generalized weakness, deconditioning acute issues of diarrhea.  Exhaustive efforts were done and attempt to find a facility that would accept patient after discharge, however, these were unsuccessful.  Patient had no further acute needs or requirements for inpatient monitoring and management and he was hemodynamically stable and so patient was eventually transferred to [facility].  Patient's progress has been complicated by his noncompliance with therapy and unwillingness to work with physical therapy/Occupational Therapy.  During his stay patient has been treated with IV diuresis and he had also been started on Ozempic therapy to aid in weight loss so patient could possibly pursue joint placement therapy.  Patient evaluated by 3 different orthopedic surgeons including referral to [facility] and all of them advised that the patient need to continue losing weight in order to be considered for surgery and patient need to have a weight of around 280 pounds.  Patient continually ordered door Dash food while in [facility], did not work well with skilled therapy services and was consistently noncompliant with medical recommendations.  Patient tested positive for COVID-19 on 10/14/2024 and per [facility] policy patient was transferred to acute care at [facility].  Patient was not hypoxic and was asymptomatic from COVID-19, no  therapies for COVID-19 were initiated.  Echocardiogram was obtained for evaluation of possible heart failure it showed no LV dysfunction, diastolic function difficult to ascertain per cardiology but patient may be with grade 1 diastolic dysfunction.  Patient with edema in extremities but less evidence of any underlying heart failure otherwise.  Edema may be contributed due to his low albumin level.    Interval Followup:   No acute issues overnight.  Patient did have some soft blood pressures.  Denied any chest pain or shortness of breath.  Nursing with no additional acute issues to report.    Pain Medication:   -Percocet    Objective   Objective     Vitals:   Temp:  [97.8 °F (36.6 °C)-98.2 °F (36.8 °C)] 98.1 °F (36.7 °C)  Heart Rate:  [69-77] 69  Resp:  [16-18] 16  BP: ()/(38-79) 93/79  Physical Exam   Gen: No acute distress, conversant, obese male, sitting up in bed  Resp: CTAB, No w/r/r, normal respiratory effort  Card: RRR, No m/r/g  Abd: Soft, Nontender, Nondistended, + bowel sounds    Result Review    Result Review:  I have personally reviewed the results as below and agree with these findings:  []  Laboratory:   CMP          10/6/2024    04:47 10/10/2024    05:16 10/15/2024    05:34   CMP   Glucose 184  81  145    BUN 28  19  22    Creatinine 0.62  0.66  0.75    EGFR 106.1  104.1  100.1    Sodium 134  136  134    Potassium 4.6  4.5  4.3    Chloride 100  104  103    Calcium 7.9  8.2  8.1    Total Protein   5.9    Albumin 2.6  2.8  2.9    Globulin   3.0    Total Bilirubin   0.5    Alkaline Phosphatase   197    AST (SGOT)   48    ALT (SGPT)   38    Albumin/Globulin Ratio   1.0    BUN/Creatinine Ratio 45.2  28.8  29.3    Anion Gap 10.3  7.2  6.6      CBC          8/18/2024    04:36 8/28/2024    04:43 9/15/2024    13:56   CBC   WBC 6.01  4.00  4.51    RBC 3.69  3.64  3.89    Hemoglobin 10.0  9.7  10.2    Hematocrit 31.5  31.3  33.0    MCV 85.4  86.0  84.8    MCH 27.1  26.6  26.2    MCHC 31.7  31.0  30.9     RDW 15.1  14.6  15.3    Platelets 116  93  100      []  Microbiology:   []  Radiology:   []  EKG/Telemetry:    []  Cardiology/Vascular:    []  Pathology:  []  Old records:  []  Other:    Assessment & Plan   Assessment / Plan     Assessment:  COVID-19 infection  Morbid obesity (BMI: 43)  Medical noncompliance  Chronic diastolic heart failure, not acutely exacerbated  Deconditioning  Type 2 diabetes mellitus  Chronic paroxysmal atrial fibrillation  Essential hypertension  Debility  Severe degenerative joint disease of lower extremities  Chronic venous stasis  Chronic bilateral foot wounds  Thrombocytopenia  Anasarca    Plan:  -Cardiology consulted and following, appreciate assistance and recommendations in the care of this patient.  -Continue low-salt diet.  Patient noncompliant with diet restrictions and fluid restriction.  Orders outside food frequently.  Patient also is resistant to monitoring his weight due to difficulty standing  -Patient asymptomatic from COVID-19.  No indication for any treatment for COVID-19 at this time.  -Pain management with as needed Percocet.  No plan to change pain regimen at this time.  -Due to soft pressures we will discontinue ACE inhibitor and decrease dose of Aldactone  -Continue Jardiance, Bumex, digoxin and beta-blocker therapy  -Continue apixaban  -No indication for continued lab monitoring unless there is an acute change in the patient's condition  -Social work/case management closely involved in patient's care.  -I reviewed the criteria determined the patient does not meet criteria for discharge to a skilled nursing facility.  The patient is appropriate for long-term care will discharge to home environment personal care assistance.  -Clinical course will dictate further management     DVT Prophylaxis: Apixaban  GI Prophylaxis: Famotidine  Diet:   Diet Order   Procedures    Diet: Diabetic Diets, High Protein Diet, Cardiac; Low Sodium (2g); Texture: Regular Texture (IDDSI 7);  Fluid Consistency: Thin (IDDSI 0)     Dispo: Case management/social work actively involved in patient care     Time spent personally reviewing patient's chart, labs and imaging, evaluating/examining the patient, discussing care plan with patient and nurse at bedside and discussing management with consultants (cardiology) and discussing patient's dispo plans with case management/social work: 44 minutes.     Part of this note may be an electronic transcription/translation of spoken language to printed text using the Dragon dictation system.    VTE Prophylaxis:  Pharmacologic & mechanical VTE prophylaxis orders are present.      CODE STATUS:   Code Status (Patient has no pulse and is not breathing): CPR (Attempt to Resuscitate)  Medical Interventions (Patient has pulse or is breathing): Full Support      Electronically signed by Joe Haynes MD, 10/17/2024, 10:49 EDT.

## 2024-10-17 NOTE — NURSING NOTE
"Rounded on patient with  , to discuss current discharge plan and provide updates where warranted. At the time of rounding, patient is alert and oriented, pleasant, and willing to discuss discharge plan with myself and CM.   Informed patient about [facility] home health agency accepting referral and that they understand his PT/OT needs. Patient voiced concerns about the type of care that will be provided and the time frame for the care they will provide, we explained that this was still being discussed and that we would update him when we have more information.   We also informed patient of our current work on finding a provider that can perform telehealth visits in order to monitor patient. Patient stated his insurance has set him up with a primary care MD that he has never seen and was wondering if they did telehealth visits, patient stated he would call and find out if this is an option.   Informed patient that his primary pharmacy has the capabilities to deliver his medications to his home to which he voiced understanding and had no complaints.   Patient voiced no concerns over obtaining groceries, but had concerns over preparing/cooking his meals. Informed patient this is something we are discussing with home health agencies and would update when we had more information.    We asked patient if he had any medical equipment needs to which he stated he already had a hospital bed at home that \"his friend\" can put together and stated that he still needs a bedside table and trapeze/lift bar attachment for his bed once it is built. Case management currently gathering information on for this equipment to fit his needs.    At this time patient voices no other needs or concerns in regards to this discharge plan. Patient would like a written/printed copy of his discharge plan when finalized.   "

## 2024-10-17 NOTE — PLAN OF CARE
Goal Outcome Evaluation:  Plan of Care Reviewed With: patient        Progress: improving  Outcome Evaluation: VSS.  Pt has had c/o of pain today.  Treated with rotating ultram and oxycodone.  BP was low this shift and BP meds held.  No major health changes to report.  Continue POC.

## 2024-10-17 NOTE — THERAPY EVALUATION
Patient Name: Jose Shaikh  : 1960    MRN: [id number]                              Today's Date: 10/17/2024       Admit Date: 10/14/2024    Visit Dx: No diagnosis found.  Patient Active Problem List   Diagnosis    Diabetic ulcer of left heel associated with type 2 DM    Acute osteomyelitis of left calcaneus     Diabetic ulcer of left heel associated with type 2 DM    Diabetic ulcer of right midfoot associated with type 2 DM    Paroxysmal atrial fibrillation    Essential hypertension    Hyperlipidemia LDL goal <100    Cellulitis and abscess of foot    High alkaline phosphatase level    Osteomyelitis    Onychomycosis    Onychocryptosis    Foot pain, bilateral    Osteomyelitis of foot, right, acute    Cellulitis of right foot    Type 2 diabetes mellitus, with long-term current use of insulin    Class 3 severe obesity due to excess calories with serious comorbidity and body mass index (BMI) of 45.0 to 49.9 in adult    Anxiety disorder, unspecified    Claustrophobia    Dependence on wheelchair    Depression, unspecified    Long term (current) use of anticoagulants    Long term (current) use of oral hypoglycemic drugs    Wound of foot    Ulcer of right foot    Orthostatic hypotension    Other chronic osteomyelitis, right ankle and foot    Personal history of nicotine dependence    Thrombocytopenia, unspecified    Unspecified open wound, right foot, initial encounter    Diabetic foot infection    Subacute osteomyelitis of right foot    Right foot pain    Sepsis    Onychomycosis    Foot pain, left    COVID-19 virus detected    Absence of toe of right foot    Corns and callosity    Disability of walking    Fracture    Limb swelling    Polyneuropathy    Pressure ulcer, stage 1    Shortness of breath    Generalized weakness    Debility    Onychomycosis    Noncompliance of patient with dietary regimen    Morbid obesity    COVID-19 virus infection    COVID-19     Past Medical History:   Diagnosis Date    Absence of toe  of right foot     Acute osteomyelitis of left calcaneus  08/18/2021    Anxiety and depression     Arthritis     Cancer     Chronic pain     STATES HIS PAIN IS 10/10 AAT    Claustrophobia     Corns and callus     Diabetic ulcer of left heel associated with type 2 DM 08/18/2021    Diabetic ulcer of left heel associated with type 2 DM 07/06/2021    Diabetic ulcer of right midfoot associated with type 2 DM 08/18/2021    Difficulty walking     Essential hypertension 08/31/2021    Hammertoe     Hyperlipidemia LDL goal <100 08/31/2021    Ingrown toenail     Obesity     Paroxysmal atrial fibrillation 08/31/2021    Polyneuropathy     Pressure ulcer, stage 1     Tinea unguium     Type 2 diabetes mellitus with polyneuropathy      Past Surgical History:   Procedure Laterality Date    CYST REMOVAL      center of back; benign    EYE SURGERY      INCISION AND DRAINAGE ABSCESS      back    INCISION AND DRAINAGE LEG Right 12/10/2021    Procedure: INCISION AND DRAINAGE LOWER EXTREMITY;  Surgeon: Ash Leyva DPM;  Location: [facility] OR;  Service: Podiatry;  Laterality: Right;    OTHER SURGICAL HISTORY      Surgical clips left foot    TOE SURGERY Right     Removal of 5th toe    TRANS METATARSAL AMPUTATION Right 12/02/2021    Procedure: AMPUTATION TRANS METATARSAL;  Surgeon: Ash Leyva DPM;  Location: [facility] OR;  Service: Podiatry;  Laterality: Right;    VASCULAR SURGERY      WRIST SURGERY Left     repair of injury      General Information       Row Name 10/17/24 0911          OT Time and Intention    Document Type evaluation  -PG     Mode of Treatment individual therapy;occupational therapy  -PG     Patient Effort good  -PG       Row Name 10/17/24 0911          General Information    Prior Level of Function dependent:;ADL's  -PG     Existing Precautions/Restrictions fall  -PG     Barriers to Rehab ineffective coping  -PG       Row Name 10/17/24 0911          Occupational Profile    Reason for  Services/Referral (Occupational Profile) Patient is a bedbound 63-year-old admitted for COVID and immobility.  Patient has not been receiving any OT services.  Patient is now being evaluated by Occupational Therapy due to recent decline in ADL function.  -PG       Row Name 10/17/24 0911          Living Environment    People in Home alone  -PG       Row Name 10/17/24 0911          Cognition    Orientation Status (Cognition) oriented x 3  -PG               User Key  (r) = Recorded By, (t) = Taken By, (c) = Cosigned By      Initials Name Provider Type    PG Kodak Matos OT Occupational Therapist                     Mobility/ADL's       Row Name 10/17/24 0912          Bathing Assessment/Intervention    [place] Level (Bathing) bathing skills;moderate assist (50% patient effort)  -PG       Row Name 10/17/24 0912          Upper Body Dressing Assessment/Training    [place] Level (Upper Body Dressing) upper body dressing skills;set up  -PG       Row Name 10/17/24 0912          Lower Body Dressing Assessment/Training    [place] Level (Lower Body Dressing) dependent (less than 25% patient effort)  -PG       Row Name 10/17/24 0912          Grooming Assessment/Training    [place] Level (Grooming) grooming skills;set up  -PG       Row Name 10/17/24 0912          Toileting Assessment/Training    [place] Level (Toileting) toileting skills;maximum assist (25% patient effort)  -PG               User Key  (r) = Recorded By, (t) = Taken By, (c) = Cosigned By      Initials Name Provider Type    PG Kodak Matos OT Occupational Therapist                   Obj/Interventions       Row Name 10/17/24 0913          Sensory Assessment (Somatosensory)    Sensory Assessment (Somatosensory) sensation intact  -PG       Row Name 10/17/24 0913          Vision Assessment/Intervention    Visual Impairment/Limitations WFL  -PG       Row Name 10/17/24 0913          Range of Motion Comprehensive    General Range of Motion no  range of motion deficits identified  -PG       Row Name 10/17/24 0913          Strength Comprehensive (MMT)    General Manual Muscle Testing (MMT) Assessment no strength deficits identified  -PG       Row Name 10/17/24 0913          Motor Skills    Motor Skills coordination;functional endurance  -PG     Coordination WFL  -PG     Functional Endurance Fair  -PG               User Key  (r) = Recorded By, (t) = Taken By, (c) = Cosigned By      Initials Name Provider Type    PG Kodak Matos, OT Occupational Therapist                   Goals/Plan    No documentation.                  Clinical Impression       Row Name 10/17/24 0913          Pain Assessment    Pretreatment Pain Rating 0/10 - no pain  -PG     Posttreatment Pain Rating 0/10 - no pain  -PG       Row Name 10/17/24 0913          Plan of Care Review    Plan of Care Reviewed With patient  -PG     Progress no change  -PG     Outcome Evaluation Patient is currently bedbound with no further therapy recommended at this time.  Patient will do well at home but will need caregivers for toileting and bathing activities as well as provide meals for him as he is nonambulatory.  Would recommend home health services should patient return home.  -PG       Row Name 10/17/24 0913          Therapy Assessment/Plan (OT)    Criteria for Skilled Therapeutic Interventions Met (OT) no;no problems identified which require skilled intervention  -PG     Therapy Frequency (OT) evaluation only  -PG       Row Name 10/17/24 0913          Therapy Plan Review/Discharge Plan (OT)    Anticipated Discharge Disposition (OT) home with assist;home with home health  -PG               User Key  (r) = Recorded By, (t) = Taken By, (c) = Cosigned By      Initials Name Provider Type    PG Kodak Matos OT Occupational Therapist                   Outcome Measures       Row Name 10/17/24 0915          How much help from another is currently needed...    Putting on and taking off regular lower body  clothing? 1  -PG     Bathing (including washing, rinsing, and drying) 2  -PG     Toileting (which includes using toilet bed pan or urinal) 2  -PG     Putting on and taking off regular upper body clothing 3  -PG     Taking care of personal grooming (such as brushing teeth) 4  -PG     Eating meals 4  -PG     AM-PAC 6 Clicks Score (OT) 16  -PG       Row Name 10/17/24 0915          Functional Assessment    Outcome Measure Options AM-PAC 6 Clicks Daily Activity (OT);Optimal Instrument  -PG       Row Name 10/17/24 0915          Optimal Instrument    Optimal Instrument Optimal - 3  -PG     Bending/Stooping 5  -PG     Standing 5  -PG     Reaching 1  -PG               User Key  (r) = Recorded By, (t) = Taken By, (c) = Cosigned By      Initials Name Provider Type    PG Kodak Matos OT Occupational Therapist                    Occupational Therapy Education       Title: PT OT SLP Therapies (Resolved)       Topic: Occupational Therapy (Resolved)       Point: ADL training (Resolved)       Description:   Instruct learner(s) on proper safety adaptation and remediation techniques during self care or transfers.   Instruct in proper use of assistive devices.                  Learner Progress:  Not documented in this visit.              Point: Home exercise program (Resolved)       Description:   Instruct learner(s) on appropriate technique for monitoring, assisting and/or progressing therapeutic exercises/activities.                  Learner Progress:  Not documented in this visit.              Point: Precautions (Resolved)       Description:   Instruct learner(s) on prescribed precautions during self-care and functional transfers.                  Learner Progress:  Not documented in this visit.              Point: Body mechanics (Resolved)       Description:   Instruct learner(s) on proper positioning and spine alignment during self-care, functional mobility activities and/or exercises.                  Learner Progress:  Not  documented in this visit.                                  OT Recommendation and Plan  Therapy Frequency (OT): evaluation only  Plan of Care Review  Plan of Care Reviewed With: patient  Progress: no change  Outcome Evaluation: Patient is currently bedbound with no further therapy recommended at this time.  Patient will do well at home but will need caregivers for toileting and bathing activities as well as provide meals for him as he is nonambulatory.  Would recommend home health services should patient return home.     Time Calculation:   Evaluation Complexity (OT)  Review Occupational Profile/Medical/Therapy History Complexity: brief/low complexity  Assessment, Occupational Performance/Identification of Deficit Complexity: 1-3 performance deficits  Clinical Decision Making Complexity (OT): problem focused assessment/low complexity  Overall Complexity of Evaluation (OT): low complexity     Time Calculation- OT       Row Name 10/17/24 0916             Time Calculation- OT    OT Received On 10/17/24  -PG         Untimed Charges    OT Eval/Re-eval Minutes 30  -PG         Total Minutes    Untimed Charges Total Minutes 30  -PG       Total Minutes 30  -PG                User Key  (r) = Recorded By, (t) = Taken By, (c) = Cosigned By      Initials Name Provider Type    PG Kodak Matos OT Occupational Therapist                  Therapy Charges for Today       Code Description Service Date Service Provider Modifiers Qty    [id number] HC OT EVAL LOW COMPLEXITY 2 10/17/2024 Kodak Matos OT GO 1                 Kodak Matos OT  10/17/2024

## 2024-10-18 LAB
GLUCOSE BLDC GLUCOMTR-MCNC: 116 MG/DL (ref 70–99)
GLUCOSE BLDC GLUCOMTR-MCNC: 138 MG/DL (ref 70–99)
GLUCOSE BLDC GLUCOMTR-MCNC: 88 MG/DL (ref 70–99)

## 2024-10-18 PROCEDURE — A9270 NON-COVERED ITEM OR SERVICE: HCPCS | Performed by: INTERNAL MEDICINE

## 2024-10-18 PROCEDURE — 63710000001 ACETAMINOPHEN 325 MG TABLET: Performed by: INTERNAL MEDICINE

## 2024-10-18 PROCEDURE — 63710000001 SACCHAROMYCES BOULARDII 250 MG CAPSULE: Performed by: INTERNAL MEDICINE

## 2024-10-18 PROCEDURE — 63710000001 OXYCODONE-ACETAMINOPHEN 7.5-325 MG TABLET: Performed by: INTERNAL MEDICINE

## 2024-10-18 PROCEDURE — 63710000001 FAMOTIDINE 20 MG TABLET: Performed by: INTERNAL MEDICINE

## 2024-10-18 PROCEDURE — 63710000001 MELATONIN 5 MG TABLET: Performed by: INTERNAL MEDICINE

## 2024-10-18 PROCEDURE — 99213 OFFICE O/P EST LOW 20 MIN: CPT | Performed by: INTERNAL MEDICINE

## 2024-10-18 PROCEDURE — 99215 OFFICE O/P EST HI 40 MIN: CPT | Performed by: INTERNAL MEDICINE

## 2024-10-18 PROCEDURE — 63710000001 EMPAGLIFLOZIN 10 MG TABLET: Performed by: INTERNAL MEDICINE

## 2024-10-18 PROCEDURE — 63710000001 APIXABAN 5 MG TABLET: Performed by: INTERNAL MEDICINE

## 2024-10-18 PROCEDURE — 63710000001 INSULIN GLARGINE PER 5 UNITS: Performed by: INTERNAL MEDICINE

## 2024-10-18 PROCEDURE — 63710000001 INSULIN LISPRO (HUMAN) PER 5 UNITS: Performed by: INTERNAL MEDICINE

## 2024-10-18 PROCEDURE — 63710000001 SERTRALINE 50 MG TABLET: Performed by: INTERNAL MEDICINE

## 2024-10-18 PROCEDURE — 63710000001 IBUPROFEN 400 MG TABLET: Performed by: INTERNAL MEDICINE

## 2024-10-18 PROCEDURE — 82948 REAGENT STRIP/BLOOD GLUCOSE: CPT

## 2024-10-18 PROCEDURE — 63710000001 ATORVASTATIN 10 MG TABLET: Performed by: INTERNAL MEDICINE

## 2024-10-18 PROCEDURE — 63710000001 CETIRIZINE 10 MG TABLET: Performed by: INTERNAL MEDICINE

## 2024-10-18 PROCEDURE — 63710000001 TRAMADOL 50 MG TABLET: Performed by: INTERNAL MEDICINE

## 2024-10-18 PROCEDURE — 63710000001 DIGOXIN 125 MCG TABLET: Performed by: INTERNAL MEDICINE

## 2024-10-18 PROCEDURE — 63710000001 PREGABALIN 25 MG CAPSULE: Performed by: INTERNAL MEDICINE

## 2024-10-18 PROCEDURE — 63710000001 BACLOFEN 10 MG TABLET: Performed by: INTERNAL MEDICINE

## 2024-10-18 PROCEDURE — 63710000001 MONTELUKAST 10 MG TABLET: Performed by: INTERNAL MEDICINE

## 2024-10-18 RX ORDER — IBUPROFEN 400 MG/1
400 TABLET, FILM COATED ORAL 2 TIMES DAILY PRN
Status: DISCONTINUED | OUTPATIENT
Start: 2024-10-18 | End: 2024-10-24 | Stop reason: HOSPADM

## 2024-10-18 RX ADMIN — MONTELUKAST 10 MG: 10 TABLET, FILM COATED ORAL at 21:57

## 2024-10-18 RX ADMIN — INSULIN GLARGINE 25 UNITS: 100 INJECTION, SOLUTION SUBCUTANEOUS at 09:03

## 2024-10-18 RX ADMIN — CETIRIZINE HYDROCHLORIDE 10 MG: 10 TABLET, FILM COATED ORAL at 09:05

## 2024-10-18 RX ADMIN — FAMOTIDINE 20 MG: 20 TABLET ORAL at 09:05

## 2024-10-18 RX ADMIN — APIXABAN 5 MG: 5 TABLET, FILM COATED ORAL at 21:57

## 2024-10-18 RX ADMIN — DICLOFENAC SODIUM 4 G: 10 GEL TOPICAL at 09:07

## 2024-10-18 RX ADMIN — INSULIN GLARGINE 25 UNITS: 100 INJECTION, SOLUTION SUBCUTANEOUS at 21:57

## 2024-10-18 RX ADMIN — HYDROCORTISONE ACETATE 1 APPLICATION: 1 CREAM TOPICAL at 09:07

## 2024-10-18 RX ADMIN — BACLOFEN 10 MG: 10 TABLET ORAL at 16:00

## 2024-10-18 RX ADMIN — DICLOFENAC SODIUM 4 G: 10 GEL TOPICAL at 15:55

## 2024-10-18 RX ADMIN — IBUPROFEN 400 MG: 400 TABLET, FILM COATED ORAL at 12:20

## 2024-10-18 RX ADMIN — Medication 500 MG: at 10:21

## 2024-10-18 RX ADMIN — Medication 500 MG: at 21:57

## 2024-10-18 RX ADMIN — DIGOXIN 125 MCG: 0.12 TABLET ORAL at 12:20

## 2024-10-18 RX ADMIN — PREGABALIN 25 MG: 25 CAPSULE ORAL at 21:57

## 2024-10-18 RX ADMIN — Medication: at 09:08

## 2024-10-18 RX ADMIN — PREGABALIN 25 MG: 25 CAPSULE ORAL at 09:05

## 2024-10-18 RX ADMIN — SERTRALINE HYDROCHLORIDE 50 MG: 50 TABLET ORAL at 21:57

## 2024-10-18 RX ADMIN — ACETAMINOPHEN 650 MG: 325 TABLET ORAL at 09:04

## 2024-10-18 RX ADMIN — BACLOFEN 10 MG: 10 TABLET ORAL at 23:49

## 2024-10-18 RX ADMIN — PREGABALIN 25 MG: 25 CAPSULE ORAL at 15:54

## 2024-10-18 RX ADMIN — TRAMADOL HYDROCHLORIDE 50 MG: 50 TABLET, COATED ORAL at 03:27

## 2024-10-18 RX ADMIN — Medication 10 MG: at 21:57

## 2024-10-18 RX ADMIN — ATORVASTATIN CALCIUM 10 MG: 10 TABLET, FILM COATED ORAL at 21:57

## 2024-10-18 RX ADMIN — OXYCODONE HYDROCHLORIDE AND ACETAMINOPHEN 1 TABLET: 7.5; 325 TABLET ORAL at 23:49

## 2024-10-18 RX ADMIN — OXYCODONE HYDROCHLORIDE AND ACETAMINOPHEN 1 TABLET: 7.5; 325 TABLET ORAL at 07:03

## 2024-10-18 RX ADMIN — EMPAGLIFLOZIN 10 MG: 10 TABLET, FILM COATED ORAL at 09:05

## 2024-10-18 RX ADMIN — BACLOFEN 10 MG: 10 TABLET ORAL at 07:03

## 2024-10-18 RX ADMIN — INSULIN LISPRO 3 UNITS: 100 INJECTION, SOLUTION INTRAVENOUS; SUBCUTANEOUS at 21:57

## 2024-10-18 RX ADMIN — APIXABAN 5 MG: 5 TABLET, FILM COATED ORAL at 09:04

## 2024-10-18 RX ADMIN — OXYCODONE HYDROCHLORIDE AND ACETAMINOPHEN 1 TABLET: 7.5; 325 TABLET ORAL at 15:54

## 2024-10-18 RX ADMIN — DICLOFENAC SODIUM 4 G: 10 GEL TOPICAL at 21:59

## 2024-10-18 NOTE — PLAN OF CARE
Goal Outcome Evaluation:  Plan of Care Reviewed With: patient        Progress: no change     Around the clock dosing for pain medication. Patient has rested off and on. BP still soft but patient asymptomatic

## 2024-10-18 NOTE — PROGRESS NOTES
CARDIOLOGY  INPATIENT PROGRESS NOTE                [address]    10/18/2024      PATIENT IDENTIFICATION:   Name:  Jose Shaikh      MRN:  [id number]     63 y.o.  male             No chief complaint on file.       SUBJECTIVE:   Patient clinically stable no events overnight  OBJECTIVE:  Vitals:    10/17/24 2017 10/17/24 2359 10/18/24 0349 10/18/24 0700   BP: 109/54 97/45 95/53 98/60   BP Location: Left arm Left arm Left arm Right arm   Patient Position: Lying Lying Lying Lying   Pulse: 74 69 64 64   Resp: 18 17 18 16   Temp: 97.9 °F (36.6 °C)  98.1 °F (36.7 °C) 98.4 °F (36.9 °C)   TempSrc: Oral  Oral Oral   SpO2: 98% 97% 97% 99%   Height:               Body mass index is 43.83 kg/m².    Intake/Output Summary (Last 24 hours) at 10/18/2024 1148  Last data filed at 10/18/2024 1105  Gross per 24 hour   Intake 720 ml   Output 850 ml   Net -130 ml         Physical Exam  General Appearance:   no acute distress  Alert and oriented x3  HENT:   lips not cyanotic  Atraumatic  Neck:  No jvd   supple  Respiratory:  no respiratory distress  normal breath sounds  no rales  Cardiovascular:    regular rhythm  no S3, no S4   no murmur  no rub  lower extremity edema: Positive lymphedema  Skin:   warm, dry  No rashes      Allergies   Allergen Reactions    Adhesive Tape Rash     Scheduled meds:  !Patient Home Medications Stored in Pharmacy, , Does not apply, BID  apixaban, 5 mg, Oral, Q12H  atorvastatin, 10 mg, Oral, Nightly  cetirizine, 10 mg, Oral, Daily  cholestyramine light, 1 packet, Oral, Daily  Diclofenac Sodium, 4 g, Topical, Q6H  digoxin, 125 mcg, Oral, Daily  empagliflozin, 10 mg, Oral, Daily  famotidine, 20 mg, Oral, Daily  hydrocortisone, 1 Application, Topical, Daily  insulin glargine, 25 Units, Subcutaneous, Daily  insulin glargine, 25 Units, Subcutaneous, Nightly  insulin lispro, 3-14 Units, Subcutaneous, 4x Daily AC & at Bedtime  melatonin, 10 mg, Oral, Nightly  metoprolol succinate XL, 12.5 mg,  "Oral, Q24H  montelukast, 10 mg, Oral, Nightly  pregabalin, 25 mg, Oral, TID  saccharomyces boulardii, 500 mg, Oral, BID  sertraline, 50 mg, Oral, Nightly      IV meds:                         Data Review:  CBC          8/18/2024    04:36 8/28/2024    04:43 9/15/2024    13:56   CBC   WBC 6.01  4.00  4.51    RBC 3.69  3.64  3.89    Hemoglobin 10.0  9.7  10.2    Hematocrit 31.5  31.3  33.0    MCV 85.4  86.0  84.8    MCH 27.1  26.6  26.2    MCHC 31.7  31.0  30.9    RDW 15.1  14.6  15.3    Platelets 116  93  100      CMP          10/6/2024    04:47 10/10/2024    05:16 10/15/2024    05:34   CMP   Glucose 184  81  145    BUN 28  19  22    Creatinine 0.62  0.66  0.75    EGFR 106.1  104.1  100.1    Sodium 134  136  134    Potassium 4.6  4.5  4.3    Chloride 100  104  103    Calcium 7.9  8.2  8.1    Total Protein   5.9    Albumin 2.6  2.8  2.9    Globulin   3.0    Total Bilirubin   0.5    Alkaline Phosphatase   197    AST (SGOT)   48    ALT (SGPT)   38    Albumin/Globulin Ratio   1.0    BUN/Creatinine Ratio 45.2  28.8  29.3    Anion Gap 10.3  7.2  6.6       CARDIAC LABS:         Lab Results   Component Value Date    DIGOXIN 0.44 (L) 09/10/2023      Lab Results   Component Value Date    TSH 2.050 08/23/2024           Invalid input(s): \"LDLCALC\"  No results found for: \"POCTROP\"  Lab Results   Component Value Date    TROPONINT 17 (H) 08/01/2023   (  Lab Results   Component Value Date    MG 2.2 09/28/2024     Results for orders placed during the hospital encounter of 10/14/24    Adult Transthoracic Echo Complete W/ Cont if Necessary Per Protocol    Interpretation Summary    Left ventricular systolic function is normal. Left ventricular ejection fraction appears to be 61 - 65%.    Left ventricular wall thickness is consistent with borderline concentric hypertrophy.    Left ventricular diastolic function is consistent with (grade I) impaired relaxation.    There are no hemodynamically significant valvular abnormalities.    This is " a technically difficult study.  Contrast used for better endocardial definition.           ASSESSMENT:    COVID-19 virus infection    COVID-19        PLAN:  1.  Patient clinically stable did not feel need for further attempted diuresis not clear that he has underlying CHF as the driving cause some of his edema may be contributed from his low albumin levels.  2.  Continue with his Eliquis 5 twice daily for CVA prevention.  Will sign off      Jose Duval MD  10/18/2024    11:48 EDT

## 2024-10-18 NOTE — PROGRESS NOTES
[facility]   Hospitalist Progress Note  Date: 10/18/2024  Patient Name: Jose Shaikh  : 1960  MRN: [id number]  Date of admission: 10/14/2024  Consultants:   -Cardiology: Dr. Jose Duval    Subjective   Subjective     Chief Complaint: COVID-19 infection    Summary:   Jose Shaikh is a 63 y.o. male who was transferred to [facility] acute care after testing positive for COVID-19 at [facility].  Patient was initially hospitalized on 09/10/2023 and had a prolonged hospitalization for treatment and management of generalized weakness, deconditioning acute issues of diarrhea.  Exhaustive efforts were done and attempt to find a facility that would accept patient after discharge, however, these were unsuccessful.  Patient had no further acute needs or requirements for inpatient monitoring and management and he was hemodynamically stable and so patient was eventually transferred to [facility].  Patient's progress has been complicated by his noncompliance with therapy and unwillingness to work with physical therapy/Occupational Therapy.  During his stay patient has been treated with IV diuresis and he had also been started on Ozempic therapy to aid in weight loss so patient could possibly pursue joint placement therapy.  Patient evaluated by 3 different orthopedic surgeons including referral to [facility] and all of them advised that the patient need to continue losing weight in order to be considered for surgery and patient need to have a weight of around 280 pounds.  Patient continually ordered door Dash food while in [facility], did not work well with skilled therapy services and was consistently noncompliant with medical recommendations.  Patient tested positive for COVID-19 on 10/14/2024 and per [facility] policy patient was transferred to acute care at [facility].  Patient was not hypoxic and was asymptomatic from COVID-19, no  therapies for COVID-19 were initiated.  Echocardiogram was obtained for evaluation of possible heart failure it showed no LV dysfunction, diastolic function difficult to ascertain per cardiology but patient may be with grade 1 diastolic dysfunction.  Patient with edema in extremities but less evidence of any underlying heart failure otherwise.  Edema may be contributed due to his low albumin level.    Interval Followup:   No acute events overnight.  Blood pressure has been soft.  Denied any chest pain or shortness breath.    Pain Medication:   -Percocet    Objective   Objective     Vitals:   Temp:  [97.9 °F (36.6 °C)-98.7 °F (37.1 °C)] 98.4 °F (36.9 °C)  Heart Rate:  [64-74] 64  Resp:  [16-18] 16  BP: ()/(45-63) 98/60  Physical Exam   Gen: No acute distress, obese male, sitting up in bed, conversant  Resp: CTAB, No w/r/r, equal chest rise bilaterally  Card: RRR, No m/r/g  Abd: Soft, Nontender, Nondistended, + bowel sounds    Result Review    Result Review:  I have personally reviewed the results as below and agree with these findings:  []  Laboratory:   CMP          10/6/2024    04:47 10/10/2024    05:16 10/15/2024    05:34   CMP   Glucose 184  81  145    BUN 28  19  22    Creatinine 0.62  0.66  0.75    EGFR 106.1  104.1  100.1    Sodium 134  136  134    Potassium 4.6  4.5  4.3    Chloride 100  104  103    Calcium 7.9  8.2  8.1    Total Protein   5.9    Albumin 2.6  2.8  2.9    Globulin   3.0    Total Bilirubin   0.5    Alkaline Phosphatase   197    AST (SGOT)   48    ALT (SGPT)   38    Albumin/Globulin Ratio   1.0    BUN/Creatinine Ratio 45.2  28.8  29.3    Anion Gap 10.3  7.2  6.6      CBC          8/18/2024    04:36 8/28/2024    04:43 9/15/2024    13:56   CBC   WBC 6.01  4.00  4.51    RBC 3.69  3.64  3.89    Hemoglobin 10.0  9.7  10.2    Hematocrit 31.5  31.3  33.0    MCV 85.4  86.0  84.8    MCH 27.1  26.6  26.2    MCHC 31.7  31.0  30.9    RDW 15.1  14.6  15.3    Platelets 116  93  100      []   Microbiology:   []  Radiology:   []  EKG/Telemetry:    []  Cardiology/Vascular:    []  Pathology:  []  Old records:  []  Other:    Assessment & Plan   Assessment / Plan     Assessment:  COVID-19 infection  Morbid obesity (BMI: 43)  Medical noncompliance  Chronic diastolic heart failure, not acutely exacerbated  Deconditioning  Type 2 diabetes mellitus  Chronic paroxysmal atrial fibrillation  Essential hypertension  Debility  Severe degenerative joint disease of lower extremities  Chronic venous stasis  Chronic bilateral foot wounds  Thrombocytopenia  Anasarca    Plan:  -Cardiology consulted and following, appreciate assistance and recommendations in the care of this patient.  -Continue low-salt diet.  Patient noncompliant with diet restrictions and fluid restriction.  Orders outside food frequently.  Patient also is resistant to monitoring his weight due to difficulty standing  -Pain management with as needed Percocet.  Patient had been on as needed ibuprofen while in [facility] for pain management as below, will resume  -Blood pressures remain soft.  Discontinue spironolactone and Bumex  -Continue Jardiance, digoxin and beta-blocker therapy  -Continue apixaban  -No indication for continued lab monitoring unless there is an acute change in the patient's condition  -Social work/case management closely involved in patient's care.  -I reviewed the criteria determined the patient does not meet criteria for discharge to a skilled nursing facility.  The patient is appropriate for long-term care will discharge to home environment personal care assistance.  -Clinical course will dictate further management      -Due to pt's severe obesity and arthritis, they require positioning of the body in ways not feasible in an ordinary bed. Pt requires frequent and immediate changes in body position. Pt also requires a trapeze bar for assistance getting in and out bed. Pt weighs 341 pounds and needs heavy duty equipment  due to obesity and body configuration. -Pt's mobility limitation impairs ability to participate in all ADL's. Mobility limitation cannot be resolved by a cane or walker. Pt can safely and independently maneuver the wheelchair, and the home provides adequate access between rooms. Use of wheelchair will improve pt's ability to participate in MRADL's and pt will be using inside the home      DVT Prophylaxis: Apixaban  GI Prophylaxis: Famotidine  Diet:   Diet Order   Procedures    Diet: Diabetic Diets, High Protein Diet, Cardiac; Low Sodium (2g); Texture: Regular Texture (IDDSI 7); Fluid Consistency: Thin (IDDSI 0)     Dispo: Case management/social work actively involved in patient care     Time spent personally reviewing patient's chart, labs and imaging, evaluating/examining the patient, discussing care plan with patient, nurse at bedside and discussing patient's dispo plans with case management/social work: 42 minutes.     Part of this note may be an electronic transcription/translation of spoken language to printed text using the Dragon dictation system.    VTE Prophylaxis:  Pharmacologic & mechanical VTE prophylaxis orders are present.      CODE STATUS:   Code Status (Patient has no pulse and is not breathing): CPR (Attempt to Resuscitate)  Medical Interventions (Patient has pulse or is breathing): Full Support      Electronically signed by Joe Haynes MD, 10/18/2024, 11:28 EDT.

## 2024-10-18 NOTE — NURSING NOTE
Rounded on patient this AM. Patient alert & oriented, pleasant, and willing to talk to me about his plans. Informed patient that at this time there were no major updates from previous conversation, patient voiced understanding with no questions at this time.   Patient stated that his residence does have a new ramp for easy stretcher/wheel chair access, also stated that his bed -when it is built- will be in the living area with a clean, direct path to the front door. No complaints from patient in regards to current discharge plan.

## 2024-10-18 NOTE — PLAN OF CARE
Goal Outcome Evaluation:  Plan of Care Reviewed With: patient        Progress: improving  Outcome Evaluation: Pt a/ox4. Pain and discomfort treated as per MAR. BP soft. Room air. Diet adjusted to pt request- refer to nutrition notes. Continue with plan of care.

## 2024-10-19 LAB
GLUCOSE BLDC GLUCOMTR-MCNC: 121 MG/DL (ref 70–99)
GLUCOSE BLDC GLUCOMTR-MCNC: 136 MG/DL (ref 70–99)
GLUCOSE BLDC GLUCOMTR-MCNC: 137 MG/DL (ref 70–99)
GLUCOSE BLDC GLUCOMTR-MCNC: 150 MG/DL (ref 70–99)
GLUCOSE BLDC GLUCOMTR-MCNC: 181 MG/DL (ref 70–99)

## 2024-10-19 PROCEDURE — 63710000001 INSULIN GLARGINE PER 5 UNITS: Performed by: INTERNAL MEDICINE

## 2024-10-19 PROCEDURE — 63710000001 APIXABAN 5 MG TABLET: Performed by: INTERNAL MEDICINE

## 2024-10-19 PROCEDURE — 63710000001 MELATONIN 5 MG TABLET: Performed by: INTERNAL MEDICINE

## 2024-10-19 PROCEDURE — A9270 NON-COVERED ITEM OR SERVICE: HCPCS | Performed by: INTERNAL MEDICINE

## 2024-10-19 PROCEDURE — 63710000001 FAMOTIDINE 20 MG TABLET: Performed by: INTERNAL MEDICINE

## 2024-10-19 PROCEDURE — 63710000001 PREGABALIN 25 MG CAPSULE: Performed by: INTERNAL MEDICINE

## 2024-10-19 PROCEDURE — 63710000001 BACLOFEN 10 MG TABLET: Performed by: INTERNAL MEDICINE

## 2024-10-19 PROCEDURE — 63710000001 DIGOXIN 125 MCG TABLET: Performed by: INTERNAL MEDICINE

## 2024-10-19 PROCEDURE — 63710000001 MONTELUKAST 10 MG TABLET: Performed by: INTERNAL MEDICINE

## 2024-10-19 PROCEDURE — 63710000001 METOPROLOL SUCCINATE XL 25 MG TABLET SUSTAINED-RELEASE 24 HOUR: Performed by: INTERNAL MEDICINE

## 2024-10-19 PROCEDURE — 63710000001 CETIRIZINE 10 MG TABLET: Performed by: INTERNAL MEDICINE

## 2024-10-19 PROCEDURE — 63710000001 EMPAGLIFLOZIN 10 MG TABLET: Performed by: INTERNAL MEDICINE

## 2024-10-19 PROCEDURE — 96376 TX/PRO/DX INJ SAME DRUG ADON: CPT

## 2024-10-19 PROCEDURE — 63710000001 SACCHAROMYCES BOULARDII 250 MG CAPSULE: Performed by: INTERNAL MEDICINE

## 2024-10-19 PROCEDURE — 99215 OFFICE O/P EST HI 40 MIN: CPT | Performed by: INTERNAL MEDICINE

## 2024-10-19 PROCEDURE — 63710000001 SERTRALINE 50 MG TABLET: Performed by: INTERNAL MEDICINE

## 2024-10-19 PROCEDURE — 63710000001 ATORVASTATIN 10 MG TABLET: Performed by: INTERNAL MEDICINE

## 2024-10-19 PROCEDURE — 63710000001 OXYCODONE-ACETAMINOPHEN 7.5-325 MG TABLET: Performed by: INTERNAL MEDICINE

## 2024-10-19 PROCEDURE — 82948 REAGENT STRIP/BLOOD GLUCOSE: CPT

## 2024-10-19 PROCEDURE — 63710000001 INSULIN LISPRO (HUMAN) PER 5 UNITS: Performed by: INTERNAL MEDICINE

## 2024-10-19 PROCEDURE — 25010000002 ONDANSETRON PER 1 MG: Performed by: INTERNAL MEDICINE

## 2024-10-19 PROCEDURE — 63710000001 IBUPROFEN 400 MG TABLET: Performed by: INTERNAL MEDICINE

## 2024-10-19 PROCEDURE — 63710000001 CHOLESTYRAMINE LIGHT 4 G PACK: Performed by: INTERNAL MEDICINE

## 2024-10-19 RX ADMIN — FAMOTIDINE 20 MG: 20 TABLET ORAL at 08:14

## 2024-10-19 RX ADMIN — EMPAGLIFLOZIN 10 MG: 10 TABLET, FILM COATED ORAL at 08:15

## 2024-10-19 RX ADMIN — INSULIN GLARGINE 25 UNITS: 100 INJECTION, SOLUTION SUBCUTANEOUS at 21:13

## 2024-10-19 RX ADMIN — Medication 500 MG: at 10:26

## 2024-10-19 RX ADMIN — INSULIN GLARGINE 25 UNITS: 100 INJECTION, SOLUTION SUBCUTANEOUS at 08:14

## 2024-10-19 RX ADMIN — BACLOFEN 10 MG: 10 TABLET ORAL at 08:15

## 2024-10-19 RX ADMIN — Medication 500 MG: at 21:13

## 2024-10-19 RX ADMIN — Medication 10 MG: at 21:13

## 2024-10-19 RX ADMIN — APIXABAN 5 MG: 5 TABLET, FILM COATED ORAL at 08:14

## 2024-10-19 RX ADMIN — MONTELUKAST 10 MG: 10 TABLET, FILM COATED ORAL at 21:13

## 2024-10-19 RX ADMIN — DIGOXIN 125 MCG: 0.12 TABLET ORAL at 12:24

## 2024-10-19 RX ADMIN — ATORVASTATIN CALCIUM 10 MG: 10 TABLET, FILM COATED ORAL at 21:13

## 2024-10-19 RX ADMIN — IBUPROFEN 400 MG: 400 TABLET, FILM COATED ORAL at 02:54

## 2024-10-19 RX ADMIN — IBUPROFEN 400 MG: 400 TABLET, FILM COATED ORAL at 12:23

## 2024-10-19 RX ADMIN — METOPROLOL SUCCINATE 12.5 MG: 25 TABLET, FILM COATED, EXTENDED RELEASE ORAL at 08:15

## 2024-10-19 RX ADMIN — SERTRALINE HYDROCHLORIDE 50 MG: 50 TABLET ORAL at 21:13

## 2024-10-19 RX ADMIN — ONDANSETRON 4 MG: 2 INJECTION INTRAMUSCULAR; INTRAVENOUS at 21:22

## 2024-10-19 RX ADMIN — PREGABALIN 25 MG: 25 CAPSULE ORAL at 16:46

## 2024-10-19 RX ADMIN — BACLOFEN 10 MG: 10 TABLET ORAL at 16:47

## 2024-10-19 RX ADMIN — DICLOFENAC SODIUM 4 G: 10 GEL TOPICAL at 03:43

## 2024-10-19 RX ADMIN — OXYCODONE HYDROCHLORIDE AND ACETAMINOPHEN 1 TABLET: 7.5; 325 TABLET ORAL at 08:15

## 2024-10-19 RX ADMIN — PREGABALIN 25 MG: 25 CAPSULE ORAL at 10:27

## 2024-10-19 RX ADMIN — OXYCODONE HYDROCHLORIDE AND ACETAMINOPHEN 1 TABLET: 7.5; 325 TABLET ORAL at 16:47

## 2024-10-19 RX ADMIN — APIXABAN 5 MG: 5 TABLET, FILM COATED ORAL at 21:13

## 2024-10-19 RX ADMIN — CETIRIZINE HYDROCHLORIDE 10 MG: 10 TABLET, FILM COATED ORAL at 08:14

## 2024-10-19 RX ADMIN — INSULIN LISPRO 3 UNITS: 100 INJECTION, SOLUTION INTRAVENOUS; SUBCUTANEOUS at 17:53

## 2024-10-19 RX ADMIN — PREGABALIN 25 MG: 25 CAPSULE ORAL at 21:13

## 2024-10-19 NOTE — PLAN OF CARE
Goal Outcome Evaluation:  Plan of Care Reviewed With: patient        Progress: no change  Outcome Evaluation: VSS, c/o pain in left back and hip, see mar. Bedrest, q2t, cream applied to buttocks. No new concerns this shift. Remains in enhanced precautions, continue plan of care.

## 2024-10-19 NOTE — PLAN OF CARE
Goal Outcome Evaluation:  Plan of Care Reviewed With: patient           Outcome Evaluation: Pt continues to have c/o left shoulder and left hip pain. medicated per mar. no changes noted.

## 2024-10-19 NOTE — PROGRESS NOTES
[facility]   Hospitalist Progress Note  Date: 10/19/2024  Patient Name: Jose Shaikh  : 1960  MRN: [id number]  Date of admission: 10/14/2024  Consultants:   -Cardiology: Dr. Jose Duval    Subjective   Subjective     Chief Complaint: COVID-19 infection    Summary:   Jose Shaikh is a 63 y.o. male who was transferred to [facility] acute care after testing positive for COVID-19 at [facility].  Patient was initially hospitalized on 09/10/2023 and had a prolonged hospitalization for treatment and management of generalized weakness, deconditioning acute issues of diarrhea.  Exhaustive efforts were done and attempt to find a facility that would accept patient after discharge, however, these were unsuccessful.  Patient had no further acute needs or requirements for inpatient monitoring and management and he was hemodynamically stable and so patient was eventually transferred to [facility].  Patient's progress has been complicated by his noncompliance with therapy and unwillingness to work with physical therapy/Occupational Therapy.  During his stay patient has been treated with IV diuresis and he had also been started on Ozempic therapy to aid in weight loss so patient could possibly pursue joint placement therapy.  Patient evaluated by 3 different orthopedic surgeons including referral to [facility] and all of them advised that the patient need to continue losing weight in order to be considered for surgery and patient need to have a weight of around 280 pounds.  Patient continually ordered door Dash food while in [facility], did not work well with skilled therapy services and was consistently noncompliant with medical recommendations.  Patient tested positive for COVID-19 on 10/14/2024 and per [facility] policy patient was transferred to acute care at [facility].  Patient was not hypoxic and was asymptomatic from COVID-19, no  therapies for COVID-19 were initiated.  Echocardiogram was obtained for evaluation of possible heart failure it showed no LV dysfunction, diastolic function difficult to ascertain per cardiology but patient may be with grade 1 diastolic dysfunction.  Patient with edema in extremities but less evidence of any underlying heart failure otherwise.  Edema may be contributed due to his low albumin level.    Interval Followup:   No acute issues overnight.  Blood pressure stable.  Denies any shortness of breath or chest pain.  Nursing with no additional acute issues to report.    Pain Medication:   -Percocet    Objective   Objective     Vitals:   Temp:  [97.9 °F (36.6 °C)-98.6 °F (37 °C)] 98.1 °F (36.7 °C)  Heart Rate:  [56-74] 56  Resp:  [16-18] 18  BP: ()/(45-67) 110/56  Physical Exam   Gen: No acute distress, sitting up eating breakfast, obese male, conversant  Resp: CTAB, No w/r/r, normal respiratory effort  Card: RRR, No m/r/g  Abd: Soft, Nontender, Nondistended, + bowel sounds    Result Review    Result Review:  I have personally reviewed the results as below and agree with these findings:  []  Laboratory:   CMP          10/6/2024    04:47 10/10/2024    05:16 10/15/2024    05:34   CMP   Glucose 184  81  145    BUN 28  19  22    Creatinine 0.62  0.66  0.75    EGFR 106.1  104.1  100.1    Sodium 134  136  134    Potassium 4.6  4.5  4.3    Chloride 100  104  103    Calcium 7.9  8.2  8.1    Total Protein   5.9    Albumin 2.6  2.8  2.9    Globulin   3.0    Total Bilirubin   0.5    Alkaline Phosphatase   197    AST (SGOT)   48    ALT (SGPT)   38    Albumin/Globulin Ratio   1.0    BUN/Creatinine Ratio 45.2  28.8  29.3    Anion Gap 10.3  7.2  6.6      CBC          8/18/2024    04:36 8/28/2024    04:43 9/15/2024    13:56   CBC   WBC 6.01  4.00  4.51    RBC 3.69  3.64  3.89    Hemoglobin 10.0  9.7  10.2    Hematocrit 31.5  31.3  33.0    MCV 85.4  86.0  84.8    MCH 27.1  26.6  26.2    MCHC 31.7  31.0  30.9    RDW 15.1  14.6   15.3    Platelets 116  93  100      []  Microbiology:   []  Radiology:   []  EKG/Telemetry:    []  Cardiology/Vascular:    []  Pathology:  []  Old records:  []  Other:    Assessment & Plan   Assessment / Plan     Assessment:  COVID-19 infection  Morbid obesity (BMI: 43)  Medical noncompliance  Chronic diastolic heart failure, not acutely exacerbated  Deconditioning  Type 2 diabetes mellitus  Chronic paroxysmal atrial fibrillation  Essential hypertension  Debility  Severe degenerative joint disease of lower extremities  Chronic venous stasis  Chronic bilateral foot wounds  Thrombocytopenia  Anasarca    Plan:  -Cardiology consulted and following, appreciate assistance and recommendations in the care of this patient.  -As needed Percocet and as needed ibuprofen available for pain management  -Blood pressures stable.  Continue without the Bumex and spironolactone.  -Continue Jardiance, digoxin and beta-blocker therapy  -Continue apixaban  -No indication for continued lab monitoring unless there is an acute change in the patient's condition  -Social work/case management closely involved in patient's care.  -I reviewed the criteria and determined the patient does not meet criteria for discharge to a skilled nursing facility.  The patient is appropriate for long-term care will discharge to home environment personal care assistance.  -Clinical course will dictate further management     DVT Prophylaxis: Apixaban  GI Prophylaxis: Famotidine  Diet:   Diet Order   Procedures    Diet: Diabetic Diets, High Protein Diet, Cardiac; Low Sodium (2g); Texture: Regular Texture (IDDSI 7); Fluid Consistency: Thin (IDDSI 0)     Dispo: Case management/social work actively involved in patient care     Time spent personally reviewing patient's chart, labs and imaging, evaluating/examining the patient, discussing care plan with patient, nurse at bedside and discussing patient's dispo plans with case management/social work: 41 minutes.      Part of this note may be an electronic transcription/translation of spoken language to printed text using the Dragon dictation system.    VTE Prophylaxis:  Pharmacologic & mechanical VTE prophylaxis orders are present.      CODE STATUS:   Code Status (Patient has no pulse and is not breathing): CPR (Attempt to Resuscitate)  Medical Interventions (Patient has pulse or is breathing): Full Support      Electronically signed by Joe Haynes MD, 10/19/2024, 10:26 EDT.

## 2024-10-20 LAB
BACTERIA ISLT: NORMAL
GLUCOSE BLDC GLUCOMTR-MCNC: 135 MG/DL (ref 70–99)
GLUCOSE BLDC GLUCOMTR-MCNC: 143 MG/DL (ref 70–99)
GLUCOSE BLDC GLUCOMTR-MCNC: 163 MG/DL (ref 70–99)
GLUCOSE BLDC GLUCOMTR-MCNC: 171 MG/DL (ref 70–99)

## 2024-10-20 PROCEDURE — 63710000001 CETIRIZINE 10 MG TABLET: Performed by: INTERNAL MEDICINE

## 2024-10-20 PROCEDURE — 63710000001 METOPROLOL SUCCINATE XL 25 MG TABLET SUSTAINED-RELEASE 24 HOUR: Performed by: INTERNAL MEDICINE

## 2024-10-20 PROCEDURE — 63710000001 INSULIN GLARGINE PER 5 UNITS: Performed by: INTERNAL MEDICINE

## 2024-10-20 PROCEDURE — 99232 SBSQ HOSP IP/OBS MODERATE 35: CPT | Performed by: STUDENT IN AN ORGANIZED HEALTH CARE EDUCATION/TRAINING PROGRAM

## 2024-10-20 PROCEDURE — A9270 NON-COVERED ITEM OR SERVICE: HCPCS | Performed by: INTERNAL MEDICINE

## 2024-10-20 PROCEDURE — 82948 REAGENT STRIP/BLOOD GLUCOSE: CPT

## 2024-10-20 PROCEDURE — 63710000001 FAMOTIDINE 20 MG TABLET: Performed by: INTERNAL MEDICINE

## 2024-10-20 PROCEDURE — 63710000001 SACCHAROMYCES BOULARDII 250 MG CAPSULE: Performed by: INTERNAL MEDICINE

## 2024-10-20 PROCEDURE — 63710000001 SERTRALINE 50 MG TABLET: Performed by: INTERNAL MEDICINE

## 2024-10-20 PROCEDURE — 63710000001 IBUPROFEN 400 MG TABLET: Performed by: INTERNAL MEDICINE

## 2024-10-20 PROCEDURE — 63710000001 BACLOFEN 10 MG TABLET: Performed by: INTERNAL MEDICINE

## 2024-10-20 PROCEDURE — 63710000001 PREGABALIN 25 MG CAPSULE: Performed by: INTERNAL MEDICINE

## 2024-10-20 PROCEDURE — 63710000001 MELATONIN 5 MG TABLET: Performed by: INTERNAL MEDICINE

## 2024-10-20 PROCEDURE — 63710000001 BISMUTH SUBSALICYLATE 262 MG CHEWABLE TABLET: Performed by: INTERNAL MEDICINE

## 2024-10-20 PROCEDURE — A9270 NON-COVERED ITEM OR SERVICE: HCPCS | Performed by: STUDENT IN AN ORGANIZED HEALTH CARE EDUCATION/TRAINING PROGRAM

## 2024-10-20 PROCEDURE — 63710000001 OXYCODONE-ACETAMINOPHEN 7.5-325 MG TABLET: Performed by: STUDENT IN AN ORGANIZED HEALTH CARE EDUCATION/TRAINING PROGRAM

## 2024-10-20 PROCEDURE — 63710000001 ATORVASTATIN 10 MG TABLET: Performed by: INTERNAL MEDICINE

## 2024-10-20 PROCEDURE — 63710000001 EMPAGLIFLOZIN 10 MG TABLET: Performed by: INTERNAL MEDICINE

## 2024-10-20 PROCEDURE — 63710000001 APIXABAN 5 MG TABLET: Performed by: INTERNAL MEDICINE

## 2024-10-20 PROCEDURE — 63710000001 INSULIN LISPRO (HUMAN) PER 5 UNITS: Performed by: INTERNAL MEDICINE

## 2024-10-20 PROCEDURE — 63710000001 OXYCODONE-ACETAMINOPHEN 7.5-325 MG TABLET: Performed by: INTERNAL MEDICINE

## 2024-10-20 PROCEDURE — 63710000001 MONTELUKAST 10 MG TABLET: Performed by: INTERNAL MEDICINE

## 2024-10-20 RX ADMIN — Medication 10 MG: at 21:32

## 2024-10-20 RX ADMIN — BACLOFEN 10 MG: 10 TABLET ORAL at 08:06

## 2024-10-20 RX ADMIN — OXYCODONE HYDROCHLORIDE AND ACETAMINOPHEN 1 TABLET: 7.5; 325 TABLET ORAL at 08:05

## 2024-10-20 RX ADMIN — Medication 500 MG: at 08:05

## 2024-10-20 RX ADMIN — PREGABALIN 25 MG: 25 CAPSULE ORAL at 08:05

## 2024-10-20 RX ADMIN — INSULIN LISPRO 3 UNITS: 100 INJECTION, SOLUTION INTRAVENOUS; SUBCUTANEOUS at 21:31

## 2024-10-20 RX ADMIN — FAMOTIDINE 20 MG: 20 TABLET ORAL at 08:06

## 2024-10-20 RX ADMIN — APIXABAN 5 MG: 5 TABLET, FILM COATED ORAL at 21:32

## 2024-10-20 RX ADMIN — Medication 500 MG: at 21:33

## 2024-10-20 RX ADMIN — MONTELUKAST 10 MG: 10 TABLET, FILM COATED ORAL at 21:32

## 2024-10-20 RX ADMIN — METOPROLOL SUCCINATE 12.5 MG: 25 TABLET, FILM COATED, EXTENDED RELEASE ORAL at 08:05

## 2024-10-20 RX ADMIN — INSULIN GLARGINE 25 UNITS: 100 INJECTION, SOLUTION SUBCUTANEOUS at 08:05

## 2024-10-20 RX ADMIN — OXYCODONE HYDROCHLORIDE AND ACETAMINOPHEN 1 TABLET: 7.5; 325 TABLET ORAL at 19:51

## 2024-10-20 RX ADMIN — OXYCODONE HYDROCHLORIDE AND ACETAMINOPHEN 1 TABLET: 7.5; 325 TABLET ORAL at 00:54

## 2024-10-20 RX ADMIN — BACLOFEN 10 MG: 10 TABLET ORAL at 19:51

## 2024-10-20 RX ADMIN — SERTRALINE HYDROCHLORIDE 50 MG: 50 TABLET ORAL at 21:32

## 2024-10-20 RX ADMIN — APIXABAN 5 MG: 5 TABLET, FILM COATED ORAL at 08:05

## 2024-10-20 RX ADMIN — BACLOFEN 10 MG: 10 TABLET ORAL at 00:54

## 2024-10-20 RX ADMIN — PREGABALIN 25 MG: 25 CAPSULE ORAL at 21:32

## 2024-10-20 RX ADMIN — EMPAGLIFLOZIN 10 MG: 10 TABLET, FILM COATED ORAL at 08:05

## 2024-10-20 RX ADMIN — ATORVASTATIN CALCIUM 10 MG: 10 TABLET, FILM COATED ORAL at 21:32

## 2024-10-20 RX ADMIN — INSULIN GLARGINE 25 UNITS: 100 INJECTION, SOLUTION SUBCUTANEOUS at 21:32

## 2024-10-20 RX ADMIN — INSULIN LISPRO 3 UNITS: 100 INJECTION, SOLUTION INTRAVENOUS; SUBCUTANEOUS at 18:06

## 2024-10-20 RX ADMIN — CETIRIZINE HYDROCHLORIDE 10 MG: 10 TABLET, FILM COATED ORAL at 08:05

## 2024-10-20 RX ADMIN — IBUPROFEN 400 MG: 400 TABLET, FILM COATED ORAL at 06:17

## 2024-10-20 RX ADMIN — BISMUTH SUBSALICYLATE 524 MG: 262 TABLET, CHEWABLE ORAL at 15:27

## 2024-10-20 RX ADMIN — PREGABALIN 25 MG: 25 CAPSULE ORAL at 16:58

## 2024-10-20 NOTE — PROGRESS NOTES
[facility]   Hospitalist Progress Note  Date: 10/20/2024  Patient Name: Jose Shaikh  : 1960  MRN: [id number]  Date of admission: 10/14/2024  Room/Bed: 3026/1      Subjective   Subjective     Chief Complaint: COVID-19 infection     Summary:   Jose Shaikh is a 63 y.o. male who was transferred to [facility] acute care after testing positive for COVID-19 at [facility].  Patient was initially hospitalized on 09/10/2023 and had a prolonged hospitalization for treatment and management of generalized weakness, deconditioning acute issues of diarrhea.  Exhaustive efforts were done and attempt to find a facility that would accept patient after discharge, however, these were unsuccessful.  Patient had no further acute needs or requirements for inpatient monitoring and management and he was hemodynamically stable and so patient was eventually transferred to [facility].  Patient's progress has been complicated by his noncompliance with therapy and unwillingness to work with physical therapy/Occupational Therapy.  During his stay patient has been treated with IV diuresis and he had also been started on Ozempic therapy to aid in weight loss so patient could possibly pursue joint placement therapy.  Patient evaluated by 3 different orthopedic surgeons including referral to [facility] and all of them advised that the patient need to continue losing weight in order to be considered for surgery and patient need to have a weight of around 280 pounds.  Patient continually ordered door Dash food while in [facility], did not work well with skilled therapy services and was consistently noncompliant with medical recommendations.  Patient tested positive for COVID-19 on 10/14/2024 and per [facility] policy patient was transferred to acute care at [facility].  Patient was not hypoxic and was asymptomatic from COVID-19, no therapies for COVID-19  were initiated.  Echocardiogram was obtained for evaluation of possible heart failure it showed no LV dysfunction, diastolic function difficult to ascertain per cardiology but patient may be with grade 1 diastolic dysfunction.  Patient with edema in extremities but less evidence of any underlying heart failure otherwise.  Edema may be contributed due to his low albumin level.     Interval Followup:   No acute issues overnight.  No acute distress.  Blood pressure stable.  Remains on room air.  Denies any shortness of breath or chest pain.     Pain Medication:   -Percocet    Review of Systems    All systems reviewed and negative except for what is outlined above.      Objective   Objective     Vitals:   Temp:  [97.7 °F (36.5 °C)-98.5 °F (36.9 °C)] 97.7 °F (36.5 °C)  Heart Rate:  [69-78] 71  Resp:  [16-18] 18  BP: ()/(53-68) 98/56    Physical Exam   Gen: NAD, Alert and Oriented, obese  Cards: Regular rate  Pulm: On room air, no audible wheezing, can complete full sentences  Abd: soft  Extremities: trace bilateral pitting edema    Result Review    Result Review:  I have personally reviewed these results:  [x]  Laboratory          Lab 10/15/24  0534   SODIUM 134*   POTASSIUM 4.3   CHLORIDE 103   CO2 24.4   ANION GAP 6.6   BUN 22   CREATININE 0.75*   EGFR 100.1   GLUCOSE 145*   CALCIUM 8.1*         Lab 10/15/24  0534   TOTAL PROTEIN 5.9*   ALBUMIN 2.9*   GLOBULIN 3.0   ALT (SGPT) 38   AST (SGOT) 48*   BILIRUBIN 0.5   ALK PHOS 197*                     Brief Urine Lab Results  (Last result in the past 365 days)        Color   Clarity   Blood   Leuk Est   Nitrite   Protein   CREAT   Urine HCG        09/15/24 1741 Yellow   Cloudy   Negative   Negative   Negative   Negative                 [x]  Microbiology   Microbiology Results (last 10 days)       Procedure Component Value - Date/Time    CANDIDA AURIS SCREEN - Swab, Axilla Right, Axilla Left and Groin [[id number]]  (Normal) Collected: 10/15/24 0653    Lab Status: Final  result Specimen: Swab from Axilla Right, Axilla Left and Groin Updated: 10/20/24 1228     Candida Auris Screen Culture No Candida auris isolated at 5 days    COVID PRE-OP / PRE-PROCEDURE SCREENING ORDER (NO ISOLATION) - Swab, Nasal Cavity [[id number]]  (Abnormal) Collected: 10/14/24 0744    Lab Status: Final result Specimen: Swab from Nasal Cavity Updated: 10/14/24 1206    Narrative:      The following orders were created for panel order COVID PRE-OP / PRE-PROCEDURE SCREENING ORDER (NO ISOLATION) - Swab, Nasal Cavity.  Procedure                               Abnormality         Status                     ---------                               -----------         ------                     COVID-19,CEPHEID/DOMINIQUE,CO...[[id number]]  Abnormal            Final result                 Please view results for these tests on the individual orders.    COVID-19,CEPHEID/DOMINIQUE,COR/LEONARD/PAD/LEXI/LAG/BETSY IN-HOUSE,NP SWAB IN TRANSPORT MEDIA 1 HR TAT, RT-PCR - Swab, Nasopharynx [[id number]]  (Abnormal) Collected: 10/14/24 0744    Lab Status: Final result Specimen: Swab from Nasopharynx Updated: 10/14/24 1206     COVID19 Detected    Narrative:      Fact sheet for providers: https://www.fda.gov/media/789675/download     Fact sheet for patients: https://www.fda.gov/media/383048/download  Fact sheet for providers: https://www.fda.gov/media/194382/download     Fact sheet for patients: https://www.fda.gov/media/248181/download          [x]  Radiology  No radiology results for the last 7 days  []  EKG/Telemetry   []  Cardiology/Vascular   []  Pathology  []  Old records  []  Other:    Assessment & Plan   Assessment / Plan     Assessment:  COVID-19 infection  Morbid obesity (BMI: 43)  Medical noncompliance  Chronic diastolic heart failure, not acutely exacerbated  Deconditioning  Type 2 diabetes mellitus  Chronic paroxysmal atrial fibrillation  Essential hypertension  Debility  Severe degenerative joint disease of lower extremities  Chronic venous  stasis  Chronic bilateral foot wounds  Thrombocytopenia  Anasarca     Plan:  -Cardiology consulted and following, appreciate assistance and recommendations in the care of this patient.  -As needed Percocet and as needed ibuprofen available for pain management  -Blood pressures stable.  Continue without the Bumex and spironolactone.  -Continue Jardiance, digoxin and beta-blocker therapy  -Continue apixaban  -No indication for continued lab monitoring unless there is an acute change in the patient's condition  -Social work/case management closely involved in patient's care.  -Reviewed patient's long inpatient history.  Previously at [facility] SNF.  Evaluated by multiple Ortho physicians and was deemed nonsurgical candidate.  Was noncompliant with diet, physical therapy, and further recommendations.  Due to his refusal he is no longer a candidate for SNF placement.  Plan to DC home at discharge.   is attempting to get him home equipment.  -Clinical course will dictate further management       Discussed with RN.    VTE Prophylaxis:  Pharmacologic & mechanical VTE prophylaxis orders are present.        CODE STATUS:   Code Status (Patient has no pulse and is not breathing): CPR (Attempt to Resuscitate)  Medical Interventions (Patient has pulse or is breathing): Full Support      Electronically signed by Wendy Zee DO, 10/20/2024, 13:26 EDT.

## 2024-10-20 NOTE — PLAN OF CARE
Goal Outcome Evaluation:  Plan of Care Reviewed With: patient        Progress: no change  Outcome Evaluation: patient still reports pain in left hip and back, medicated per mar. No nausea today. Pepto given today per patient request. No new concerns this shift, continue plan of care.

## 2024-10-20 NOTE — PLAN OF CARE
Goal Outcome Evaluation:  Plan of Care Reviewed With: patient           Outcome Evaluation: patient reported intermittent nausea, IV zofran administered, effective. PRN medications given for pain management r/t reported hip pain, see MAR. vss. continue poc.

## 2024-10-21 LAB
027 TOXIN: ABNORMAL
C DIFF GDH + TOXINS A+B STL QL IA.RAPID: NEGATIVE
C DIFF TOX GENS STL QL NAA+PROBE: POSITIVE
GLUCOSE BLDC GLUCOMTR-MCNC: 143 MG/DL (ref 70–99)
GLUCOSE BLDC GLUCOMTR-MCNC: 167 MG/DL (ref 70–99)
GLUCOSE BLDC GLUCOMTR-MCNC: 177 MG/DL (ref 70–99)
GLUCOSE BLDC GLUCOMTR-MCNC: 216 MG/DL (ref 70–99)

## 2024-10-21 PROCEDURE — 63710000001 CETIRIZINE 10 MG TABLET: Performed by: INTERNAL MEDICINE

## 2024-10-21 PROCEDURE — 63710000001 INSULIN GLARGINE PER 5 UNITS: Performed by: INTERNAL MEDICINE

## 2024-10-21 PROCEDURE — 82948 REAGENT STRIP/BLOOD GLUCOSE: CPT

## 2024-10-21 PROCEDURE — A9270 NON-COVERED ITEM OR SERVICE: HCPCS | Performed by: INTERNAL MEDICINE

## 2024-10-21 PROCEDURE — 63710000001 METOPROLOL SUCCINATE XL 25 MG TABLET SUSTAINED-RELEASE 24 HOUR: Performed by: INTERNAL MEDICINE

## 2024-10-21 PROCEDURE — 63710000001 PREGABALIN 25 MG CAPSULE: Performed by: INTERNAL MEDICINE

## 2024-10-21 PROCEDURE — 63710000001 APIXABAN 5 MG TABLET: Performed by: INTERNAL MEDICINE

## 2024-10-21 PROCEDURE — 63710000001 SACCHAROMYCES BOULARDII 250 MG CAPSULE: Performed by: INTERNAL MEDICINE

## 2024-10-21 PROCEDURE — 99232 SBSQ HOSP IP/OBS MODERATE 35: CPT | Performed by: STUDENT IN AN ORGANIZED HEALTH CARE EDUCATION/TRAINING PROGRAM

## 2024-10-21 PROCEDURE — 63710000001 OXYCODONE-ACETAMINOPHEN 7.5-325 MG TABLET: Performed by: STUDENT IN AN ORGANIZED HEALTH CARE EDUCATION/TRAINING PROGRAM

## 2024-10-21 PROCEDURE — 63710000001 ATORVASTATIN 10 MG TABLET: Performed by: INTERNAL MEDICINE

## 2024-10-21 PROCEDURE — 63710000001 INSULIN LISPRO (HUMAN) PER 5 UNITS: Performed by: INTERNAL MEDICINE

## 2024-10-21 PROCEDURE — 87493 C DIFF AMPLIFIED PROBE: CPT | Performed by: STUDENT IN AN ORGANIZED HEALTH CARE EDUCATION/TRAINING PROGRAM

## 2024-10-21 PROCEDURE — 63710000001 FAMOTIDINE 20 MG TABLET: Performed by: INTERNAL MEDICINE

## 2024-10-21 PROCEDURE — 63710000001 EMPAGLIFLOZIN 10 MG TABLET: Performed by: INTERNAL MEDICINE

## 2024-10-21 PROCEDURE — A9270 NON-COVERED ITEM OR SERVICE: HCPCS | Performed by: STUDENT IN AN ORGANIZED HEALTH CARE EDUCATION/TRAINING PROGRAM

## 2024-10-21 PROCEDURE — 63710000001 SERTRALINE 50 MG TABLET: Performed by: INTERNAL MEDICINE

## 2024-10-21 PROCEDURE — 63710000001 MONTELUKAST 10 MG TABLET: Performed by: INTERNAL MEDICINE

## 2024-10-21 PROCEDURE — 87449 NOS EACH ORGANISM AG IA: CPT | Performed by: STUDENT IN AN ORGANIZED HEALTH CARE EDUCATION/TRAINING PROGRAM

## 2024-10-21 PROCEDURE — 63710000001 MELATONIN 5 MG TABLET: Performed by: INTERNAL MEDICINE

## 2024-10-21 PROCEDURE — 63710000001 BACLOFEN 10 MG TABLET: Performed by: INTERNAL MEDICINE

## 2024-10-21 PROCEDURE — 63710000001 DIGOXIN 125 MCG TABLET: Performed by: INTERNAL MEDICINE

## 2024-10-21 PROCEDURE — 63710000001 IBUPROFEN 400 MG TABLET: Performed by: INTERNAL MEDICINE

## 2024-10-21 PROCEDURE — 63710000001 BISMUTH SUBSALICYLATE 262 MG CHEWABLE TABLET: Performed by: INTERNAL MEDICINE

## 2024-10-21 RX ADMIN — OXYCODONE HYDROCHLORIDE AND ACETAMINOPHEN 1 TABLET: 7.5; 325 TABLET ORAL at 13:25

## 2024-10-21 RX ADMIN — IBUPROFEN 400 MG: 400 TABLET, FILM COATED ORAL at 18:07

## 2024-10-21 RX ADMIN — Medication 500 MG: at 21:57

## 2024-10-21 RX ADMIN — INSULIN GLARGINE 25 UNITS: 100 INJECTION, SOLUTION SUBCUTANEOUS at 09:33

## 2024-10-21 RX ADMIN — INSULIN GLARGINE 25 UNITS: 100 INJECTION, SOLUTION SUBCUTANEOUS at 21:56

## 2024-10-21 RX ADMIN — APIXABAN 5 MG: 5 TABLET, FILM COATED ORAL at 09:31

## 2024-10-21 RX ADMIN — PREGABALIN 25 MG: 25 CAPSULE ORAL at 21:57

## 2024-10-21 RX ADMIN — INSULIN LISPRO 3 UNITS: 100 INJECTION, SOLUTION INTRAVENOUS; SUBCUTANEOUS at 21:56

## 2024-10-21 RX ADMIN — INSULIN LISPRO 3 UNITS: 100 INJECTION, SOLUTION INTRAVENOUS; SUBCUTANEOUS at 13:25

## 2024-10-21 RX ADMIN — BACLOFEN 10 MG: 10 TABLET ORAL at 21:57

## 2024-10-21 RX ADMIN — BISMUTH SUBSALICYLATE 524 MG: 262 TABLET, CHEWABLE ORAL at 22:03

## 2024-10-21 RX ADMIN — HYDROCORTISONE ACETATE 1 APPLICATION: 1 CREAM TOPICAL at 09:35

## 2024-10-21 RX ADMIN — IBUPROFEN 400 MG: 400 TABLET, FILM COATED ORAL at 03:12

## 2024-10-21 RX ADMIN — DICLOFENAC SODIUM 4 G: 10 GEL TOPICAL at 16:35

## 2024-10-21 RX ADMIN — SERTRALINE HYDROCHLORIDE 50 MG: 50 TABLET ORAL at 21:57

## 2024-10-21 RX ADMIN — CETIRIZINE HYDROCHLORIDE 10 MG: 10 TABLET, FILM COATED ORAL at 09:31

## 2024-10-21 RX ADMIN — FAMOTIDINE 20 MG: 20 TABLET ORAL at 09:32

## 2024-10-21 RX ADMIN — PREGABALIN 25 MG: 25 CAPSULE ORAL at 16:36

## 2024-10-21 RX ADMIN — BACLOFEN 10 MG: 10 TABLET ORAL at 13:25

## 2024-10-21 RX ADMIN — APIXABAN 5 MG: 5 TABLET, FILM COATED ORAL at 21:57

## 2024-10-21 RX ADMIN — METOPROLOL SUCCINATE 12.5 MG: 25 TABLET, FILM COATED, EXTENDED RELEASE ORAL at 09:32

## 2024-10-21 RX ADMIN — DIGOXIN 125 MCG: 0.12 TABLET ORAL at 13:25

## 2024-10-21 RX ADMIN — PREGABALIN 25 MG: 25 CAPSULE ORAL at 09:32

## 2024-10-21 RX ADMIN — ATORVASTATIN CALCIUM 10 MG: 10 TABLET, FILM COATED ORAL at 21:57

## 2024-10-21 RX ADMIN — MONTELUKAST 10 MG: 10 TABLET, FILM COATED ORAL at 21:57

## 2024-10-21 RX ADMIN — EMPAGLIFLOZIN 10 MG: 10 TABLET, FILM COATED ORAL at 09:31

## 2024-10-21 RX ADMIN — Medication 10 MG: at 21:57

## 2024-10-21 RX ADMIN — OXYCODONE HYDROCHLORIDE AND ACETAMINOPHEN 1 TABLET: 7.5; 325 TABLET ORAL at 21:57

## 2024-10-21 RX ADMIN — INSULIN LISPRO 5 UNITS: 100 INJECTION, SOLUTION INTRAVENOUS; SUBCUTANEOUS at 18:07

## 2024-10-21 RX ADMIN — Medication 500 MG: at 09:32

## 2024-10-21 RX ADMIN — BACLOFEN 10 MG: 10 TABLET ORAL at 04:22

## 2024-10-21 RX ADMIN — OXYCODONE HYDROCHLORIDE AND ACETAMINOPHEN 1 TABLET: 7.5; 325 TABLET ORAL at 04:22

## 2024-10-21 NOTE — PLAN OF CARE
Goal Outcome Evaluation:  Plan of Care Reviewed With: patient      No changes overnight. Treated with PRN pain medications.

## 2024-10-21 NOTE — NURSING NOTE
Patient alert and oriented at time of leadership rounding, agreeable to discussing care and discharge plan at this time. Patient was updated on discharge plan earlier in the day by social work/case management. See their charting for update notes. At this time patient voices no complaints and remains agreeable to discharge plan.

## 2024-10-21 NOTE — PROGRESS NOTES
[facility]   Hospitalist Progress Note  Date: 10/21/2024  Patient Name: Jose Shaikh  : 1960  MRN: [id number]  Date of admission: 10/14/2024  Room/Bed: 3026/1      Subjective   Subjective     Chief Complaint: COVID-19 infection     Summary:   Jose Shaikh is a 63 y.o. male who was transferred to [facility] acute care after testing positive for COVID-19 at [facility].  Patient was initially hospitalized on 09/10/2023 and had a prolonged hospitalization for treatment and management of generalized weakness, deconditioning acute issues of diarrhea.  Exhaustive efforts were done and attempt to find a facility that would accept patient after discharge, however, these were unsuccessful.  Patient had no further acute needs or requirements for inpatient monitoring and management and he was hemodynamically stable and so patient was eventually transferred to [facility].  Patient's progress has been complicated by his noncompliance with therapy and unwillingness to work with physical therapy/Occupational Therapy.  During his stay patient has been treated with IV diuresis and he had also been started on Ozempic therapy to aid in weight loss so patient could possibly pursue joint placement therapy.  Patient evaluated by 3 different orthopedic surgeons including referral to [facility] and all of them advised that the patient need to continue losing weight in order to be considered for surgery and patient need to have a weight of around 280 pounds.  Patient continually ordered door Dash food while in [facility], did not work well with skilled therapy services and was consistently noncompliant with medical recommendations.  Patient tested positive for COVID-19 on 10/14/2024 and per [facility] policy patient was transferred to acute care at [facility].  Patient was not hypoxic and was asymptomatic from COVID-19, no therapies for COVID-19  were initiated.  Echocardiogram was obtained for evaluation of possible heart failure it showed no LV dysfunction, diastolic function difficult to ascertain per cardiology but patient may be with grade 1 diastolic dysfunction.  Patient with edema in extremities but less evidence of any underlying heart failure otherwise.  Edema may be contributed due to his low albumin level.     Interval Followup:   No acute issues overnight.  No acute distress.  Blood pressure stable.  Remains on room air.  Denies any shortness of breath or chest pain.  Resting comfortable in bed today.  He reports that he had breakfast, no nausea or vomiting.  No family at bedside.     Pain Medication:   -Percocet    Review of Systems    All systems reviewed and negative except for what is outlined above.      Objective   Objective     Vitals:   Temp:  [97.9 °F (36.6 °C)-98.8 °F (37.1 °C)] 98.5 °F (36.9 °C)  Heart Rate:  [68-76] 76  Resp:  [16-18] 16  BP: ()/(48-72) 128/65    Physical Exam   Gen: NAD, Alert and Oriented, morbidly obese  Cards: Regular rate  Pulm: On room air, no audible wheezing, can complete full sentences  Abd: soft  Extremities: trace bilateral pitting edema    Result Review    Result Review:  I have personally reviewed these results:  [x]  Laboratory          Lab 10/15/24  0534   SODIUM 134*   POTASSIUM 4.3   CHLORIDE 103   CO2 24.4   ANION GAP 6.6   BUN 22   CREATININE 0.75*   EGFR 100.1   GLUCOSE 145*   CALCIUM 8.1*         Lab 10/15/24  0534   TOTAL PROTEIN 5.9*   ALBUMIN 2.9*   GLOBULIN 3.0   ALT (SGPT) 38   AST (SGOT) 48*   BILIRUBIN 0.5   ALK PHOS 197*                     Brief Urine Lab Results  (Last result in the past 365 days)        Color   Clarity   Blood   Leuk Est   Nitrite   Protein   CREAT   Urine HCG        09/15/24 1741 Yellow   Cloudy   Negative   Negative   Negative   Negative                 [x]  Microbiology   Microbiology Results (last 10 days)       Procedure Component Value - Date/Time     NIRAV AURIS SCREEN - Swab, Axilla Right, Axilla Left and Groin [[id number]]  (Normal) Collected: 10/15/24 0653    Lab Status: Final result Specimen: Swab from Axilla Right, Axilla Left and Groin Updated: 10/20/24 1228     Candida Auris Screen Culture No Candida auris isolated at 5 days    COVID PRE-OP / PRE-PROCEDURE SCREENING ORDER (NO ISOLATION) - Swab, Nasal Cavity [[id number]]  (Abnormal) Collected: 10/14/24 0744    Lab Status: Final result Specimen: Swab from Nasal Cavity Updated: 10/14/24 1206    Narrative:      The following orders were created for panel order COVID PRE-OP / PRE-PROCEDURE SCREENING ORDER (NO ISOLATION) - Swab, Nasal Cavity.  Procedure                               Abnormality         Status                     ---------                               -----------         ------                     COVID-19,CEPHEID/DOMINIQUE,CO...[[id number]]  Abnormal            Final result                 Please view results for these tests on the individual orders.    COVID-19,CEPHEID/DOMINIQUE,COR/LEONARD/PAD/LEXI/LAG/BETSY IN-HOUSE,NP SWAB IN TRANSPORT MEDIA 1 HR TAT, RT-PCR - Swab, Nasopharynx [[id number]]  (Abnormal) Collected: 10/14/24 0744    Lab Status: Final result Specimen: Swab from Nasopharynx Updated: 10/14/24 1206     COVID19 Detected    Narrative:      Fact sheet for providers: https://www.fda.gov/media/148211/download     Fact sheet for patients: https://www.fda.gov/media/819452/download  Fact sheet for providers: https://www.fda.gov/media/889936/download     Fact sheet for patients: https://www.fda.gov/media/043951/download          [x]  Radiology  No radiology results for the last 7 days  []  EKG/Telemetry   []  Cardiology/Vascular   []  Pathology  []  Old records  []  Other:    Assessment & Plan   Assessment / Plan     Assessment:  COVID-19 infection  Morbid obesity (BMI: 43)  Medical noncompliance  Chronic diastolic heart failure, not acutely exacerbated  Deconditioning  Type 2 diabetes mellitus  Chronic  paroxysmal atrial fibrillation  Essential hypertension  Debility  Severe degenerative joint disease of lower extremities  Chronic venous stasis  Chronic bilateral foot wounds  Thrombocytopenia  Anasarca     Plan:  -Cardiology consulted and following, appreciate assistance and recommendations in the care of this patient.  -As needed Percocet and as needed ibuprofen available for pain management  -Blood pressures stable.  Continue without the Bumex and spironolactone.  -Continue Jardiance, digoxin and beta-blocker therapy  -Continue apixaban  -No indication for continued lab monitoring unless there is an acute change in the patient's condition  -Social work/case management closely involved in patient's care.  -Reviewed patient's long inpatient history.  Previously at [facility] SNF.  Evaluated by multiple Ortho physicians and was deemed nonsurgical candidate.  Was noncompliant with diet, physical therapy, and further recommendations.  Due to his refusal he is no longer a candidate for SNF placement.  Plan to DC home at discharge.   is attempting to get him home equipment.  -Clinical course will dictate further management       Discussed with RN.    VTE Prophylaxis:  Pharmacologic & mechanical VTE prophylaxis orders are present.        CODE STATUS:   Code Status (Patient has no pulse and is not breathing): CPR (Attempt to Resuscitate)  Medical Interventions (Patient has pulse or is breathing): Full Support      Electronically signed by Wendy Zee DO, 10/21/2024, 14:26 EDT.

## 2024-10-21 NOTE — PLAN OF CARE
Problem: Adult Inpatient Plan of Care  Goal: Plan of Care Review  Outcome: Progressing  Flowsheets (Taken 10/21/2024 1555)  Progress: no change  Outcome Evaluation: VSS. GAVE PAIN MEDICATION AS ORDERED. BLOOD SUGARS HAVE BEEN STABLE. NO NEW CONCERNS AT THIS TIME.  Plan of Care Reviewed With: patient  Goal: Patient-Specific Goal (Individualized)  Outcome: Progressing  Goal: Absence of Hospital-Acquired Illness or Injury  Outcome: Progressing  Intervention: Identify and Manage Fall Risk  Recent Flowsheet Documentation  Taken 10/21/2024 1555 by Francisco Javier Hall RN  Safety Promotion/Fall Prevention: safety round/check completed  Taken 10/21/2024 1305 by Francisco Javier Hall RN  Safety Promotion/Fall Prevention: safety round/check completed  Taken 10/21/2024 1110 by Francisco Javier Hall RN  Safety Promotion/Fall Prevention: safety round/check completed  Taken 10/21/2024 0757 by Francisco Javier Hall RN  Safety Promotion/Fall Prevention: safety round/check completed  Goal: Optimal Comfort and Wellbeing  Outcome: Progressing  Intervention: Monitor Pain and Promote Comfort  Recent Flowsheet Documentation  Taken 10/21/2024 0757 by Francisco Javier Hall RN  Pain Management Interventions: pillow support provided  Goal: Readiness for Transition of Care  Outcome: Progressing     Problem: Skin Injury Risk Increased  Goal: Skin Health and Integrity  Outcome: Progressing  Intervention: Optimize Skin Protection  Recent Flowsheet Documentation  Taken 10/21/2024 0757 by Francisco Javier Hall RN  Pressure Reduction Techniques:   frequent weight shift encouraged   weight shift assistance provided  Pressure Reduction Devices: specialty bed utilized     Problem: Pneumonia  Goal: Absence of Infection Signs and Symptoms  Outcome: Progressing  Intervention: Prevent Infection Progression  Recent Flowsheet Documentation  Taken 10/21/2024 0757 by Francisco Javier Hall RN  Isolation Precautions:   precautions maintained   airborne   enhanced contact  Goal: Effective Oxygenation and  Ventilation  Outcome: Progressing     Problem: Fall Injury Risk  Goal: Absence of Fall and Fall-Related Injury  Outcome: Progressing  Intervention: Promote Injury-Free Environment  Recent Flowsheet Documentation  Taken 10/21/2024 1555 by Francisco Javier Hall RN  Safety Promotion/Fall Prevention: safety round/check completed  Taken 10/21/2024 1305 by Francisco Javier Hall RN  Safety Promotion/Fall Prevention: safety round/check completed  Taken 10/21/2024 1110 by Francisco Javier Hall RN  Safety Promotion/Fall Prevention: safety round/check completed  Taken 10/21/2024 0757 by Francisco Javier Hall RN  Safety Promotion/Fall Prevention: safety round/check completed     Problem: Pain Acute  Goal: Optimal Pain Control and Function  Outcome: Progressing  Intervention: Develop Pain Management Plan  Recent Flowsheet Documentation  Taken 10/21/2024 0757 by Francisco Javier Hall RN  Pain Management Interventions: pillow support provided   Goal Outcome Evaluation:  Plan of Care Reviewed With: patient        Progress: no change  Outcome Evaluation: VSS. GAVE PAIN MEDICATION AS ORDERED. BLOOD SUGARS HAVE BEEN STABLE. NO NEW CONCERNS AT THIS TIME.

## 2024-10-22 LAB
BASOPHILS # BLD AUTO: 0.04 10*3/MM3 (ref 0–0.2)
BASOPHILS NFR BLD AUTO: 0.7 % (ref 0–1.5)
DEPRECATED RDW RBC AUTO: 47.1 FL (ref 37–54)
EOSINOPHIL # BLD AUTO: 0.13 10*3/MM3 (ref 0–0.4)
EOSINOPHIL NFR BLD AUTO: 2.3 % (ref 0.3–6.2)
ERYTHROCYTE [DISTWIDTH] IN BLOOD BY AUTOMATED COUNT: 15.6 % (ref 12.3–15.4)
GLUCOSE BLDC GLUCOMTR-MCNC: 105 MG/DL (ref 70–99)
GLUCOSE BLDC GLUCOMTR-MCNC: 121 MG/DL (ref 70–99)
GLUCOSE BLDC GLUCOMTR-MCNC: 153 MG/DL (ref 70–99)
GLUCOSE BLDC GLUCOMTR-MCNC: 162 MG/DL (ref 70–99)
GLUCOSE BLDC GLUCOMTR-MCNC: 226 MG/DL (ref 70–99)
HCT VFR BLD AUTO: 29.7 % (ref 37.5–51)
HGB BLD-MCNC: 9.4 G/DL (ref 13–17.7)
HOLD SPECIMEN: NORMAL
IMM GRANULOCYTES # BLD AUTO: 0.01 10*3/MM3 (ref 0–0.05)
IMM GRANULOCYTES NFR BLD AUTO: 0.2 % (ref 0–0.5)
LYMPHOCYTES # BLD AUTO: 0.93 10*3/MM3 (ref 0.7–3.1)
LYMPHOCYTES NFR BLD AUTO: 16.3 % (ref 19.6–45.3)
MCH RBC QN AUTO: 26 PG (ref 26.6–33)
MCHC RBC AUTO-ENTMCNC: 31.6 G/DL (ref 31.5–35.7)
MCV RBC AUTO: 82 FL (ref 79–97)
MONOCYTES # BLD AUTO: 0.68 10*3/MM3 (ref 0.1–0.9)
MONOCYTES NFR BLD AUTO: 11.9 % (ref 5–12)
NEUTROPHILS NFR BLD AUTO: 3.91 10*3/MM3 (ref 1.7–7)
NEUTROPHILS NFR BLD AUTO: 68.6 % (ref 42.7–76)
NRBC BLD AUTO-RTO: 0 /100 WBC (ref 0–0.2)
PLATELET # BLD AUTO: 106 10*3/MM3 (ref 140–450)
PMV BLD AUTO: 12.8 FL (ref 6–12)
RBC # BLD AUTO: 3.62 10*6/MM3 (ref 4.14–5.8)
WBC NRBC COR # BLD AUTO: 5.7 10*3/MM3 (ref 3.4–10.8)

## 2024-10-22 PROCEDURE — 63710000001 FAMOTIDINE 20 MG TABLET: Performed by: INTERNAL MEDICINE

## 2024-10-22 PROCEDURE — 99232 SBSQ HOSP IP/OBS MODERATE 35: CPT | Performed by: STUDENT IN AN ORGANIZED HEALTH CARE EDUCATION/TRAINING PROGRAM

## 2024-10-22 PROCEDURE — A9270 NON-COVERED ITEM OR SERVICE: HCPCS | Performed by: INTERNAL MEDICINE

## 2024-10-22 PROCEDURE — 96375 TX/PRO/DX INJ NEW DRUG ADDON: CPT

## 2024-10-22 PROCEDURE — 63710000001 APIXABAN 5 MG TABLET: Performed by: INTERNAL MEDICINE

## 2024-10-22 PROCEDURE — 63710000001 ATORVASTATIN 10 MG TABLET: Performed by: INTERNAL MEDICINE

## 2024-10-22 PROCEDURE — 63710000001 INSULIN GLARGINE PER 5 UNITS: Performed by: INTERNAL MEDICINE

## 2024-10-22 PROCEDURE — 63710000001 SACCHAROMYCES BOULARDII 250 MG CAPSULE: Performed by: INTERNAL MEDICINE

## 2024-10-22 PROCEDURE — 25010000002 ONDANSETRON PER 1 MG: Performed by: INTERNAL MEDICINE

## 2024-10-22 PROCEDURE — 96376 TX/PRO/DX INJ SAME DRUG ADON: CPT

## 2024-10-22 PROCEDURE — 25010000002 KETOROLAC TROMETHAMINE PER 15 MG: Performed by: STUDENT IN AN ORGANIZED HEALTH CARE EDUCATION/TRAINING PROGRAM

## 2024-10-22 PROCEDURE — 63710000001 BACLOFEN 10 MG TABLET: Performed by: INTERNAL MEDICINE

## 2024-10-22 PROCEDURE — 63710000001 IBUPROFEN 400 MG TABLET: Performed by: INTERNAL MEDICINE

## 2024-10-22 PROCEDURE — 63710000001 DIGOXIN 125 MCG TABLET: Performed by: INTERNAL MEDICINE

## 2024-10-22 PROCEDURE — 63710000001 SERTRALINE 50 MG TABLET: Performed by: INTERNAL MEDICINE

## 2024-10-22 PROCEDURE — 63710000001 EMPAGLIFLOZIN 10 MG TABLET: Performed by: INTERNAL MEDICINE

## 2024-10-22 PROCEDURE — 63710000001 METOPROLOL SUCCINATE XL 25 MG TABLET SUSTAINED-RELEASE 24 HOUR: Performed by: INTERNAL MEDICINE

## 2024-10-22 PROCEDURE — 63710000001 CETIRIZINE 10 MG TABLET: Performed by: INTERNAL MEDICINE

## 2024-10-22 PROCEDURE — 63710000001 OXYCODONE-ACETAMINOPHEN 7.5-325 MG TABLET: Performed by: STUDENT IN AN ORGANIZED HEALTH CARE EDUCATION/TRAINING PROGRAM

## 2024-10-22 PROCEDURE — 63710000001 MONTELUKAST 10 MG TABLET: Performed by: INTERNAL MEDICINE

## 2024-10-22 PROCEDURE — 63710000001 PREGABALIN 25 MG CAPSULE: Performed by: INTERNAL MEDICINE

## 2024-10-22 PROCEDURE — A9270 NON-COVERED ITEM OR SERVICE: HCPCS | Performed by: STUDENT IN AN ORGANIZED HEALTH CARE EDUCATION/TRAINING PROGRAM

## 2024-10-22 PROCEDURE — 63710000001 INSULIN LISPRO (HUMAN) PER 5 UNITS: Performed by: INTERNAL MEDICINE

## 2024-10-22 PROCEDURE — 63710000001 MELATONIN 5 MG TABLET: Performed by: INTERNAL MEDICINE

## 2024-10-22 PROCEDURE — 85025 COMPLETE CBC W/AUTO DIFF WBC: CPT | Performed by: STUDENT IN AN ORGANIZED HEALTH CARE EDUCATION/TRAINING PROGRAM

## 2024-10-22 PROCEDURE — 82948 REAGENT STRIP/BLOOD GLUCOSE: CPT

## 2024-10-22 RX ORDER — KETOROLAC TROMETHAMINE 15 MG/ML
15 INJECTION, SOLUTION INTRAMUSCULAR; INTRAVENOUS EVERY 6 HOURS PRN
Status: COMPLETED | OUTPATIENT
Start: 2024-10-22 | End: 2024-10-22

## 2024-10-22 RX ADMIN — KETOROLAC TROMETHAMINE 15 MG: 15 INJECTION, SOLUTION INTRAMUSCULAR; INTRAVENOUS at 11:00

## 2024-10-22 RX ADMIN — ONDANSETRON 4 MG: 2 INJECTION INTRAMUSCULAR; INTRAVENOUS at 06:23

## 2024-10-22 RX ADMIN — Medication 10 MG: at 20:53

## 2024-10-22 RX ADMIN — OXYCODONE HYDROCHLORIDE AND ACETAMINOPHEN 1 TABLET: 7.5; 325 TABLET ORAL at 23:19

## 2024-10-22 RX ADMIN — MONTELUKAST 10 MG: 10 TABLET, FILM COATED ORAL at 20:53

## 2024-10-22 RX ADMIN — EMPAGLIFLOZIN 10 MG: 10 TABLET, FILM COATED ORAL at 09:40

## 2024-10-22 RX ADMIN — SERTRALINE HYDROCHLORIDE 50 MG: 50 TABLET ORAL at 20:53

## 2024-10-22 RX ADMIN — DICLOFENAC SODIUM 4 G: 10 GEL TOPICAL at 09:42

## 2024-10-22 RX ADMIN — METOPROLOL SUCCINATE 12.5 MG: 25 TABLET, FILM COATED, EXTENDED RELEASE ORAL at 09:40

## 2024-10-22 RX ADMIN — DIGOXIN 125 MCG: 0.12 TABLET ORAL at 13:13

## 2024-10-22 RX ADMIN — HYDROCORTISONE ACETATE 1 APPLICATION: 1 CREAM TOPICAL at 09:42

## 2024-10-22 RX ADMIN — BACLOFEN 10 MG: 10 TABLET ORAL at 23:18

## 2024-10-22 RX ADMIN — INSULIN LISPRO 3 UNITS: 100 INJECTION, SOLUTION INTRAVENOUS; SUBCUTANEOUS at 17:24

## 2024-10-22 RX ADMIN — INSULIN LISPRO 5 UNITS: 100 INJECTION, SOLUTION INTRAVENOUS; SUBCUTANEOUS at 20:52

## 2024-10-22 RX ADMIN — APIXABAN 5 MG: 5 TABLET, FILM COATED ORAL at 09:39

## 2024-10-22 RX ADMIN — DICLOFENAC SODIUM 4 G: 10 GEL TOPICAL at 01:11

## 2024-10-22 RX ADMIN — IBUPROFEN 400 MG: 400 TABLET, FILM COATED ORAL at 20:53

## 2024-10-22 RX ADMIN — OXYCODONE HYDROCHLORIDE AND ACETAMINOPHEN 1 TABLET: 7.5; 325 TABLET ORAL at 06:08

## 2024-10-22 RX ADMIN — BACLOFEN 10 MG: 10 TABLET ORAL at 06:08

## 2024-10-22 RX ADMIN — INSULIN LISPRO 3 UNITS: 100 INJECTION, SOLUTION INTRAVENOUS; SUBCUTANEOUS at 13:13

## 2024-10-22 RX ADMIN — OXYCODONE HYDROCHLORIDE AND ACETAMINOPHEN 1 TABLET: 7.5; 325 TABLET ORAL at 15:10

## 2024-10-22 RX ADMIN — INSULIN GLARGINE 25 UNITS: 100 INJECTION, SOLUTION SUBCUTANEOUS at 20:52

## 2024-10-22 RX ADMIN — ATORVASTATIN CALCIUM 10 MG: 10 TABLET, FILM COATED ORAL at 20:53

## 2024-10-22 RX ADMIN — BACLOFEN 10 MG: 10 TABLET ORAL at 15:10

## 2024-10-22 RX ADMIN — PREGABALIN 25 MG: 25 CAPSULE ORAL at 09:40

## 2024-10-22 RX ADMIN — CETIRIZINE HYDROCHLORIDE 10 MG: 10 TABLET, FILM COATED ORAL at 09:40

## 2024-10-22 RX ADMIN — Medication 500 MG: at 09:40

## 2024-10-22 RX ADMIN — FAMOTIDINE 20 MG: 20 TABLET ORAL at 09:39

## 2024-10-22 RX ADMIN — APIXABAN 5 MG: 5 TABLET, FILM COATED ORAL at 20:53

## 2024-10-22 RX ADMIN — PREGABALIN 25 MG: 25 CAPSULE ORAL at 15:10

## 2024-10-22 RX ADMIN — INSULIN GLARGINE 25 UNITS: 100 INJECTION, SOLUTION SUBCUTANEOUS at 09:39

## 2024-10-22 RX ADMIN — PREGABALIN 25 MG: 25 CAPSULE ORAL at 20:53

## 2024-10-22 RX ADMIN — Medication 500 MG: at 20:53

## 2024-10-22 NOTE — PROGRESS NOTES
[facility]   Hospitalist Progress Note  Date: 10/22/2024  Patient Name: Jose Shaikh  : 1960  MRN: [id number]  Date of admission: 10/14/2024  Room/Bed: 3026/1      Subjective   Subjective     Chief Complaint: COVID-19 infection     Summary:   Jose Shaikh is a 63 y.o. male who was transferred to [facility] acute care after testing positive for COVID-19 at [facility].  Patient was initially hospitalized on 09/10/2023 and had a prolonged hospitalization for treatment and management of generalized weakness, deconditioning acute issues of diarrhea.  Exhaustive efforts were done and attempt to find a facility that would accept patient after discharge, however, these were unsuccessful.  Patient had no further acute needs or requirements for inpatient monitoring and management and he was hemodynamically stable and so patient was eventually transferred to [facility].  Patient's progress has been complicated by his noncompliance with therapy and unwillingness to work with physical therapy/Occupational Therapy.  During his stay patient has been treated with IV diuresis and he had also been started on Ozempic therapy to aid in weight loss so patient could possibly pursue joint placement therapy.  Patient evaluated by 3 different orthopedic surgeons including referral to [facility] and all of them advised that the patient need to continue losing weight in order to be considered for surgery and patient need to have a weight of around 280 pounds.  Patient continually ordered door Dash food while in [facility], did not work well with skilled therapy services and was consistently noncompliant with medical recommendations.  Patient tested positive for COVID-19 on 10/14/2024 and per [facility] policy patient was transferred to acute care at [facility].  Patient was not hypoxic and was asymptomatic from COVID-19, no therapies for COVID-19  were initiated.  Echocardiogram was obtained for evaluation of possible heart failure it showed no LV dysfunction, diastolic function difficult to ascertain per cardiology but patient may be with grade 1 diastolic dysfunction.  Patient with edema in extremities but less evidence of any underlying heart failure otherwise.  Edema may be contributed due to his low albumin level.     Interval Followup:   Patient reports multiple episodes of diarrhea overnight, however, this morning it has resolved.  He reports no issues, ate breakfast well.  Respiratory status at baseline.      Pain Medication:   -Percocet    Review of Systems    All systems reviewed and negative except for what is outlined above.      Objective   Objective     Vitals:   Temp:  [97 °F (36.1 °C)-98.2 °F (36.8 °C)] 98.2 °F (36.8 °C)  Heart Rate:  [64-69] 64  Resp:  [16-18] 16  BP: (102-115)/(50-59) 108/56    Physical Exam   Gen: NAD, Alert and Oriented, morbidly obese  Cards: Regular rate  Pulm: On room air, no audible wheezing, can complete full sentences  Abd: soft  Extremities: trace bilateral pitting edema    Result Review    Result Review:  I have personally reviewed these results:  [x]  Laboratory      Lab 10/22/24  0856   WBC 5.70   HEMOGLOBIN 9.4*   HEMATOCRIT 29.7*   PLATELETS 106*   NEUTROS ABS 3.91   IMMATURE GRANS (ABS) 0.01   LYMPHS ABS 0.93   MONOS ABS 0.68   EOS ABS 0.13   MCV 82.0                                 Brief Urine Lab Results  (Last result in the past 365 days)        Color   Clarity   Blood   Leuk Est   Nitrite   Protein   CREAT   Urine HCG        09/15/24 1741 Yellow   Cloudy   Negative   Negative   Negative   Negative                 [x]  Microbiology   Microbiology Results (last 10 days)       Procedure Component Value - Date/Time    Clostridioides difficile Toxin, PCR - Stool, Per Rectum [[id number]]  (Abnormal) Collected: 10/21/24 2130    Lab Status: Final result Specimen: Stool from Per Rectum Updated: 10/21/24 3120      Toxigenic C. difficile by PCR Positive     027 Toxin Presumptive Negative    Narrative:      DNA from a toxigenic strain of C.difficile has been detected. Antigen testing for the presence of free C.difficile toxin is currently in progress, to help determine the clinical significance of this PCR result.     Clostridioides difficile toxin Ag, Reflex - Stool, Per Rectum [[id number]]  (Normal) Collected: 10/21/24 2130    Lab Status: Final result Specimen: Stool from Per Rectum Updated: 10/21/24 2256     C.diff Toxin Ag Negative    Narrative:      DNA from a toxigenic strain of C.difficile was detected, although the free toxin itself was not detected. These findings are consistent with C.difficile colonization and may not reflect actual C.difficile infection. Clinical correlation needed.    NIRAV AURIS SCREEN - Swab, Axilla Right, Axilla Left and Groin [[id number]]  (Normal) Collected: 10/15/24 0653    Lab Status: Final result Specimen: Swab from Axilla Right, Axilla Left and Groin Updated: 10/20/24 1228     Candida Auris Screen Culture No Candida auris isolated at 5 days    COVID PRE-OP / PRE-PROCEDURE SCREENING ORDER (NO ISOLATION) - Swab, Nasal Cavity [[id number]]  (Abnormal) Collected: 10/14/24 0744    Lab Status: Final result Specimen: Swab from Nasal Cavity Updated: 10/14/24 1206    Narrative:      The following orders were created for panel order COVID PRE-OP / PRE-PROCEDURE SCREENING ORDER (NO ISOLATION) - Swab, Nasal Cavity.  Procedure                               Abnormality         Status                     ---------                               -----------         ------                     COVID-19,CEPHEID/DOMINIQUE,CO...[[id number]]  Abnormal            Final result                 Please view results for these tests on the individual orders.    COVID-19,CEPHEID/DOMINIQUE,COR/LEONARD/PAD/LEXI/LAG/BETSY IN-HOUSE,NP SWAB IN TRANSPORT MEDIA 1 HR TAT, RT-PCR - Swab, Nasopharynx [[id number]]  (Abnormal) Collected: 10/14/24 8224     Lab Status: Final result Specimen: Swab from Nasopharynx Updated: 10/14/24 1206     COVID19 Detected    Narrative:      Fact sheet for providers: https://www.fda.gov/media/124418/download     Fact sheet for patients: https://www.fda.gov/media/489129/download  Fact sheet for providers: https://www.fda.gov/media/207510/download     Fact sheet for patients: https://www.fda.gov/media/266790/download          [x]  Radiology  No radiology results for the last 7 days  []  EKG/Telemetry   []  Cardiology/Vascular   []  Pathology  []  Old records  []  Other:    Assessment & Plan   Assessment / Plan     Assessment:  COVID-19 infection  Morbid obesity (BMI: 43)  Medical noncompliance  Chronic diastolic heart failure, not acutely exacerbated  Deconditioning  Type 2 diabetes mellitus  Chronic paroxysmal atrial fibrillation  Essential hypertension  Debility  Severe degenerative joint disease of lower extremities  Chronic venous stasis  Chronic bilateral foot wounds  Thrombocytopenia  Anasarca     Plan:  -Cardiology consulted and following, appreciate assistance and recommendations in the care of this patient.  -As needed Percocet and as needed ibuprofen available for pain management  -Blood pressures stable.  Continue without the Bumex and spironolactone.  -Continue Jardiance, digoxin and beta-blocker therapy  -Continue apixaban  -No indication for continued lab monitoring unless there is an acute change in the patient's condition  -Social work/case management closely involved in patient's care.  -Reviewed patient's long inpatient history.  Previously at [facility] SNF.  Evaluated by multiple Ortho physicians and was deemed nonsurgical candidate.  Was noncompliant with diet, physical therapy, and further recommendations.  Due to his refusal he is no longer a candidate for SNF placement.  Plan to DC home at discharge.   is attempting to get him home equipment.  -Diarrhea overnight, C. difficile was sent.  PCR positive but  toxin was negative.  Likely colonization.  WBC normal.  No indication for treatment.  -Clinical course will dictate further management       Discussed with RN.    VTE Prophylaxis:  Pharmacologic & mechanical VTE prophylaxis orders are present.        CODE STATUS:   Code Status (Patient has no pulse and is not breathing): CPR (Attempt to Resuscitate)  Medical Interventions (Patient has pulse or is breathing): Full Support      Electronically signed by Wendy Zee DO, 10/22/2024, 14:45 EDT.

## 2024-10-22 NOTE — PLAN OF CARE
Problem: Adult Inpatient Plan of Care  Goal: Plan of Care Review  Outcome: Progressing  Flowsheets (Taken 10/22/2024 1517)  Progress: no change  Outcome Evaluation: VSS. MD ORDERED TORDAL FOR SEVERE PAIN. GAVE PAIN MED AS ORDERED. NO NEW CONCERNS AT THIS TIME.  Plan of Care Reviewed With: patient  Goal: Patient-Specific Goal (Individualized)  Outcome: Progressing  Goal: Absence of Hospital-Acquired Illness or Injury  Outcome: Progressing  Intervention: Identify and Manage Fall Risk  Recent Flowsheet Documentation  Taken 10/22/2024 1510 by Francisco Javier Hall RN  Safety Promotion/Fall Prevention: safety round/check completed  Taken 10/22/2024 1320 by Francisco Javier Hall RN  Safety Promotion/Fall Prevention: safety round/check completed  Taken 10/22/2024 1130 by Francisco Javier Hall RN  Safety Promotion/Fall Prevention: safety round/check completed  Taken 10/22/2024 0930 by Francisco Javier Hall RN  Safety Promotion/Fall Prevention: safety round/check completed  Taken 10/22/2024 0715 by Francisco Javier Hall RN  Safety Promotion/Fall Prevention: safety round/check completed  Goal: Optimal Comfort and Wellbeing  Outcome: Progressing  Intervention: Monitor Pain and Promote Comfort  Recent Flowsheet Documentation  Taken 10/22/2024 1510 by Francisco Javier Hall RN  Pain Management Interventions: pain medication given  Taken 10/22/2024 0930 by Francisco Javier Hall RN  Pain Management Interventions: pain management plan reviewed with patient/caregiver  Goal: Readiness for Transition of Care  Outcome: Progressing     Problem: Skin Injury Risk Increased  Goal: Skin Health and Integrity  Outcome: Progressing     Problem: Pneumonia  Goal: Absence of Infection Signs and Symptoms  Outcome: Progressing  Goal: Effective Oxygenation and Ventilation  Outcome: Progressing     Problem: Fall Injury Risk  Goal: Absence of Fall and Fall-Related Injury  Outcome: Progressing  Intervention: Promote Injury-Free Environment  Recent Flowsheet Documentation  Taken 10/22/2024 1510 by  Isabel, Francisco Javier, RN  Safety Promotion/Fall Prevention: safety round/check completed  Taken 10/22/2024 1320 by Francisco Javier Hall RN  Safety Promotion/Fall Prevention: safety round/check completed  Taken 10/22/2024 1130 by Francisco Javier Hall RN  Safety Promotion/Fall Prevention: safety round/check completed  Taken 10/22/2024 0930 by Francisco Javier Hall RN  Safety Promotion/Fall Prevention: safety round/check completed  Taken 10/22/2024 0715 by Francisco Javier Hall RN  Safety Promotion/Fall Prevention: safety round/check completed     Problem: Pain Acute  Goal: Optimal Pain Control and Function  Outcome: Progressing  Intervention: Develop Pain Management Plan  Recent Flowsheet Documentation  Taken 10/22/2024 1510 by Francisco Javier Hall RN  Pain Management Interventions: pain medication given  Taken 10/22/2024 0930 by Francisco Javier Hall RN  Pain Management Interventions: pain management plan reviewed with patient/caregiver   Goal Outcome Evaluation:  Plan of Care Reviewed With: patient        Progress: no change  Outcome Evaluation: VSS. MD ORDERED TORDAL FOR SEVERE PAIN. GAVE PAIN MED AS ORDERED. NO NEW CONCERNS AT THIS TIME.

## 2024-10-23 LAB
GLUCOSE BLDC GLUCOMTR-MCNC: 106 MG/DL (ref 70–99)
GLUCOSE BLDC GLUCOMTR-MCNC: 135 MG/DL (ref 70–99)
GLUCOSE BLDC GLUCOMTR-MCNC: 139 MG/DL (ref 70–99)
GLUCOSE BLDC GLUCOMTR-MCNC: 178 MG/DL (ref 70–99)

## 2024-10-23 PROCEDURE — 63710000001 VANCOMYCIN 125 MG CAPSULE: Performed by: STUDENT IN AN ORGANIZED HEALTH CARE EDUCATION/TRAINING PROGRAM

## 2024-10-23 PROCEDURE — A9270 NON-COVERED ITEM OR SERVICE: HCPCS | Performed by: INTERNAL MEDICINE

## 2024-10-23 PROCEDURE — 63710000001 DIGOXIN 125 MCG TABLET: Performed by: INTERNAL MEDICINE

## 2024-10-23 PROCEDURE — 63710000001 OXYCODONE-ACETAMINOPHEN 7.5-325 MG TABLET: Performed by: STUDENT IN AN ORGANIZED HEALTH CARE EDUCATION/TRAINING PROGRAM

## 2024-10-23 PROCEDURE — 63710000001 BACLOFEN 10 MG TABLET: Performed by: INTERNAL MEDICINE

## 2024-10-23 PROCEDURE — 63710000001 MELATONIN 5 MG TABLET: Performed by: INTERNAL MEDICINE

## 2024-10-23 PROCEDURE — 63710000001 INSULIN GLARGINE PER 5 UNITS: Performed by: INTERNAL MEDICINE

## 2024-10-23 PROCEDURE — A9270 NON-COVERED ITEM OR SERVICE: HCPCS | Performed by: STUDENT IN AN ORGANIZED HEALTH CARE EDUCATION/TRAINING PROGRAM

## 2024-10-23 PROCEDURE — 63710000001 IBUPROFEN 400 MG TABLET: Performed by: INTERNAL MEDICINE

## 2024-10-23 PROCEDURE — 63710000001 FAMOTIDINE 20 MG TABLET: Performed by: INTERNAL MEDICINE

## 2024-10-23 PROCEDURE — 63710000001 ATORVASTATIN 10 MG TABLET: Performed by: INTERNAL MEDICINE

## 2024-10-23 PROCEDURE — 82948 REAGENT STRIP/BLOOD GLUCOSE: CPT

## 2024-10-23 PROCEDURE — 63710000001 SERTRALINE 50 MG TABLET: Performed by: INTERNAL MEDICINE

## 2024-10-23 PROCEDURE — 25810000003 SODIUM CHLORIDE 0.9 % SOLUTION: Performed by: STUDENT IN AN ORGANIZED HEALTH CARE EDUCATION/TRAINING PROGRAM

## 2024-10-23 PROCEDURE — 63710000001 PREGABALIN 25 MG CAPSULE: Performed by: INTERNAL MEDICINE

## 2024-10-23 PROCEDURE — 63710000001 CETIRIZINE 10 MG TABLET: Performed by: INTERNAL MEDICINE

## 2024-10-23 PROCEDURE — 63710000001 INSULIN LISPRO (HUMAN) PER 5 UNITS: Performed by: INTERNAL MEDICINE

## 2024-10-23 PROCEDURE — 63710000001 MONTELUKAST 10 MG TABLET: Performed by: INTERNAL MEDICINE

## 2024-10-23 PROCEDURE — 99232 SBSQ HOSP IP/OBS MODERATE 35: CPT | Performed by: STUDENT IN AN ORGANIZED HEALTH CARE EDUCATION/TRAINING PROGRAM

## 2024-10-23 PROCEDURE — 63710000001 SACCHAROMYCES BOULARDII 250 MG CAPSULE: Performed by: INTERNAL MEDICINE

## 2024-10-23 PROCEDURE — 63710000001 EMPAGLIFLOZIN 10 MG TABLET: Performed by: INTERNAL MEDICINE

## 2024-10-23 PROCEDURE — 63710000001 ACETAMINOPHEN 325 MG TABLET: Performed by: INTERNAL MEDICINE

## 2024-10-23 PROCEDURE — 63710000001 APIXABAN 5 MG TABLET: Performed by: INTERNAL MEDICINE

## 2024-10-23 RX ORDER — VANCOMYCIN HYDROCHLORIDE 125 MG/1
125 CAPSULE ORAL EVERY 6 HOURS SCHEDULED
Status: DISCONTINUED | OUTPATIENT
Start: 2024-10-23 | End: 2024-10-24 | Stop reason: HOSPADM

## 2024-10-23 RX ORDER — OXYCODONE AND ACETAMINOPHEN 7.5; 325 MG/1; MG/1
1 TABLET ORAL EVERY 6 HOURS PRN
Qty: 12 TABLET | Refills: 0 | Status: SHIPPED | OUTPATIENT
Start: 2024-10-23 | End: 2024-10-27

## 2024-10-23 RX ORDER — INSULIN LISPRO 100 [IU]/ML
INJECTION, SOLUTION INTRAVENOUS; SUBCUTANEOUS
Qty: 15 ML | Refills: 0 | Status: SHIPPED | OUTPATIENT
Start: 2024-10-23

## 2024-10-23 RX ORDER — VANCOMYCIN HYDROCHLORIDE 125 MG/1
125 CAPSULE ORAL EVERY 6 HOURS SCHEDULED
Qty: 39 CAPSULE | Refills: 0 | Status: SHIPPED | OUTPATIENT
Start: 2024-10-23 | End: 2024-11-03

## 2024-10-23 RX ORDER — METOPROLOL SUCCINATE 25 MG/1
12.5 TABLET, EXTENDED RELEASE ORAL
Qty: 30 TABLET | Refills: 0 | Status: SHIPPED | OUTPATIENT
Start: 2024-10-24

## 2024-10-23 RX ADMIN — BACLOFEN 10 MG: 10 TABLET ORAL at 17:06

## 2024-10-23 RX ADMIN — INSULIN GLARGINE 25 UNITS: 100 INJECTION, SOLUTION SUBCUTANEOUS at 21:51

## 2024-10-23 RX ADMIN — MONTELUKAST 10 MG: 10 TABLET, FILM COATED ORAL at 21:52

## 2024-10-23 RX ADMIN — SODIUM CHLORIDE 500 ML: 9 INJECTION, SOLUTION INTRAVENOUS at 09:58

## 2024-10-23 RX ADMIN — VANCOMYCIN HYDROCHLORIDE 125 MG: 125 CAPSULE ORAL at 12:29

## 2024-10-23 RX ADMIN — Medication 500 MG: at 21:52

## 2024-10-23 RX ADMIN — SERTRALINE HYDROCHLORIDE 50 MG: 50 TABLET ORAL at 21:52

## 2024-10-23 RX ADMIN — ACETAMINOPHEN 650 MG: 325 TABLET ORAL at 03:52

## 2024-10-23 RX ADMIN — INSULIN LISPRO 3 UNITS: 100 INJECTION, SOLUTION INTRAVENOUS; SUBCUTANEOUS at 21:51

## 2024-10-23 RX ADMIN — OXYCODONE HYDROCHLORIDE AND ACETAMINOPHEN 1 TABLET: 7.5; 325 TABLET ORAL at 08:37

## 2024-10-23 RX ADMIN — DICLOFENAC SODIUM 4 G: 10 GEL TOPICAL at 03:18

## 2024-10-23 RX ADMIN — PREGABALIN 25 MG: 25 CAPSULE ORAL at 21:52

## 2024-10-23 RX ADMIN — APIXABAN 5 MG: 5 TABLET, FILM COATED ORAL at 21:52

## 2024-10-23 RX ADMIN — HYDROCORTISONE ACETATE 1 APPLICATION: 1 CREAM TOPICAL at 10:01

## 2024-10-23 RX ADMIN — Medication 500 MG: at 08:41

## 2024-10-23 RX ADMIN — DIGOXIN 125 MCG: 0.12 TABLET ORAL at 12:29

## 2024-10-23 RX ADMIN — Medication 10 MG: at 21:52

## 2024-10-23 RX ADMIN — OXYCODONE HYDROCHLORIDE AND ACETAMINOPHEN 1 TABLET: 7.5; 325 TABLET ORAL at 17:06

## 2024-10-23 RX ADMIN — PREGABALIN 25 MG: 25 CAPSULE ORAL at 08:41

## 2024-10-23 RX ADMIN — FAMOTIDINE 20 MG: 20 TABLET ORAL at 08:38

## 2024-10-23 RX ADMIN — VANCOMYCIN HYDROCHLORIDE 125 MG: 125 CAPSULE ORAL at 17:06

## 2024-10-23 RX ADMIN — BACLOFEN 10 MG: 10 TABLET ORAL at 09:28

## 2024-10-23 RX ADMIN — INSULIN GLARGINE 25 UNITS: 100 INJECTION, SOLUTION SUBCUTANEOUS at 08:39

## 2024-10-23 RX ADMIN — EMPAGLIFLOZIN 10 MG: 10 TABLET, FILM COATED ORAL at 08:37

## 2024-10-23 RX ADMIN — PREGABALIN 25 MG: 25 CAPSULE ORAL at 17:06

## 2024-10-23 RX ADMIN — ATORVASTATIN CALCIUM 10 MG: 10 TABLET, FILM COATED ORAL at 21:52

## 2024-10-23 RX ADMIN — APIXABAN 5 MG: 5 TABLET, FILM COATED ORAL at 08:38

## 2024-10-23 RX ADMIN — IBUPROFEN 400 MG: 400 TABLET, FILM COATED ORAL at 15:31

## 2024-10-23 RX ADMIN — CETIRIZINE HYDROCHLORIDE 10 MG: 10 TABLET, FILM COATED ORAL at 08:37

## 2024-10-23 NOTE — PROGRESS NOTES
[facility]   Hospitalist Progress Note  Date: 10/23/2024  Patient Name: Jose Shaikh  : 1960  MRN: [id number]  Date of admission: 10/14/2024  Room/Bed: 3026/1      Subjective   Subjective     Chief Complaint: COVID-19 infection     Summary:   Jose Shaikh is a 63 y.o. male who was transferred to [facility] acute care after testing positive for COVID-19 at [facility].  Patient was initially hospitalized on 09/10/2023 and had a prolonged hospitalization for treatment and management of generalized weakness, deconditioning acute issues of diarrhea.  Exhaustive efforts were done and attempt to find a facility that would accept patient after discharge, however, these were unsuccessful.  Patient had no further acute needs or requirements for inpatient monitoring and management and he was hemodynamically stable and so patient was eventually transferred to [facility].  Patient's progress has been complicated by his noncompliance with therapy and unwillingness to work with physical therapy/Occupational Therapy.  During his stay patient has been treated with IV diuresis and he had also been started on Ozempic therapy to aid in weight loss so patient could possibly pursue joint placement therapy.  Patient evaluated by 3 different orthopedic surgeons including referral to [facility] and all of them advised that the patient need to continue losing weight in order to be considered for surgery and patient need to have a weight of around 280 pounds.  Patient continually ordered door Dash food while in [facility], did not work well with skilled therapy services and was consistently noncompliant with medical recommendations.  Patient tested positive for COVID-19 on 10/14/2024 and per [facility] policy patient was transferred to acute care at [facility].  Patient was not hypoxic and was asymptomatic from COVID-19, no therapies for COVID-19  were initiated.  Echocardiogram was obtained for evaluation of possible heart failure it showed no LV dysfunction, diastolic function difficult to ascertain per cardiology but patient may be with grade 1 diastolic dysfunction.  Patient with edema in extremities but less evidence of any underlying heart failure otherwise.  Edema may be contributed due to his low albumin level.     Interval Followup:   Patient reports multiple episodes of diarrhea overnight, however, this morning it has resolved.  He reports no issues, ate breakfast well.  Respiratory status at baseline.      Pain Medication:   -Percocet    Review of Systems    All systems reviewed and negative except for what is outlined above.      Objective   Objective     Vitals:   Temp:  [97.5 °F (36.4 °C)-98.6 °F (37 °C)] 98.1 °F (36.7 °C)  Heart Rate:  [62-88] 88  Resp:  [16] 16  BP: ()/(32-72) 113/49    Physical Exam   Gen: NAD, Alert and Oriented, morbidly obese  Cards: Regular rate  Pulm: On room air, no audible wheezing, can complete full sentences  Abd: soft  Extremities: trace bilateral pitting edema    Result Review    Result Review:  I have personally reviewed these results:  [x]  Laboratory      Lab 10/22/24  0856   WBC 5.70   HEMOGLOBIN 9.4*   HEMATOCRIT 29.7*   PLATELETS 106*   NEUTROS ABS 3.91   IMMATURE GRANS (ABS) 0.01   LYMPHS ABS 0.93   MONOS ABS 0.68   EOS ABS 0.13   MCV 82.0                                 Brief Urine Lab Results  (Last result in the past 365 days)        Color   Clarity   Blood   Leuk Est   Nitrite   Protein   CREAT   Urine HCG        09/15/24 1741 Yellow   Cloudy   Negative   Negative   Negative   Negative                 [x]  Microbiology   Microbiology Results (last 10 days)       Procedure Component Value - Date/Time    Clostridioides difficile Toxin, PCR - Stool, Per Rectum [[id number]]  (Abnormal) Collected: 10/21/24 2130    Lab Status: Final result Specimen: Stool from Per Rectum Updated: 10/21/24 9905      Toxigenic C. difficile by PCR Positive     027 Toxin Presumptive Negative    Narrative:      DNA from a toxigenic strain of C.difficile has been detected. Antigen testing for the presence of free C.difficile toxin is currently in progress, to help determine the clinical significance of this PCR result.     Clostridioides difficile toxin Ag, Reflex - Stool, Per Rectum [[id number]]  (Normal) Collected: 10/21/24 2130    Lab Status: Final result Specimen: Stool from Per Rectum Updated: 10/21/24 2256     C.diff Toxin Ag Negative    Narrative:      DNA from a toxigenic strain of C.difficile was detected, although the free toxin itself was not detected. These findings are consistent with C.difficile colonization and may not reflect actual C.difficile infection. Clinical correlation needed.    NIRAV AURIS SCREEN - Swab, Axilla Right, Axilla Left and Groin [[id number]]  (Normal) Collected: 10/15/24 0653    Lab Status: Final result Specimen: Swab from Axilla Right, Axilla Left and Groin Updated: 10/20/24 1228     Candida Auris Screen Culture No Candida auris isolated at 5 days    COVID PRE-OP / PRE-PROCEDURE SCREENING ORDER (NO ISOLATION) - Swab, Nasal Cavity [[id number]]  (Abnormal) Collected: 10/14/24 0744    Lab Status: Final result Specimen: Swab from Nasal Cavity Updated: 10/14/24 1206    Narrative:      The following orders were created for panel order COVID PRE-OP / PRE-PROCEDURE SCREENING ORDER (NO ISOLATION) - Swab, Nasal Cavity.  Procedure                               Abnormality         Status                     ---------                               -----------         ------                     COVID-19,CEPHEID/DOMINIQUE,CO...[[id number]]  Abnormal            Final result                 Please view results for these tests on the individual orders.    COVID-19,CEPHEID/DOMINIQUE,COR/LEONARD/PAD/LEXI/LAG/BETSY IN-HOUSE,NP SWAB IN TRANSPORT MEDIA 1 HR TAT, RT-PCR - Swab, Nasopharynx [[id number]]  (Abnormal) Collected: 10/14/24 5137     Lab Status: Final result Specimen: Swab from Nasopharynx Updated: 10/14/24 1206     COVID19 Detected    Narrative:      Fact sheet for providers: https://www.fda.gov/media/544839/download     Fact sheet for patients: https://www.fda.gov/media/152615/download  Fact sheet for providers: https://www.fda.gov/media/589821/download     Fact sheet for patients: https://www.fda.gov/media/269842/download          [x]  Radiology  No radiology results for the last 7 days  []  EKG/Telemetry   []  Cardiology/Vascular   []  Pathology  []  Old records  []  Other:    Assessment & Plan   Assessment / Plan     Assessment:  COVID-19 infection  Morbid obesity (BMI: 43)  Medical noncompliance  Chronic diastolic heart failure, not acutely exacerbated  Deconditioning  Type 2 diabetes mellitus  Chronic paroxysmal atrial fibrillation  Essential hypertension  Debility  Severe degenerative joint disease of lower extremities  Chronic venous stasis  Chronic bilateral foot wounds  Thrombocytopenia  Anasarca     Plan:  -Cardiology consulted and following, appreciate assistance and recommendations in the care of this patient.  -As needed Percocet and as needed ibuprofen available for pain management  -Blood pressures stable.  Continue without the Bumex and spironolactone.  -Continue Jardiance, digoxin and beta-blocker therapy  -Continue apixaban  -No indication for continued lab monitoring unless there is an acute change in the patient's condition  -Social work/case management closely involved in patient's care.  -Reviewed patient's long inpatient history.  Previously at [facility] SNF.  Evaluated by multiple Ortho physicians and was deemed nonsurgical candidate.  Was noncompliant with diet, physical therapy, and further recommendations.  Due to his refusal he is no longer a candidate for SNF placement.  Plan to DC home at discharge.   is attempting to get him home equipment.  -Diarrhea improved but did have hypotension this morning.  PCR  positive but toxin was negative.  Likely colonization.  WBC normal.  Will go ahead and treat given low BP, p.o. vancomycin initiated.  -Hypotension this morning, s/p 500 cc bolus with improvement.  -Clinical course will dictate further management       Disposition: Working with  to get patient home, planning to DC in a.m.    Discussed with RN.    VTE Prophylaxis:  Pharmacologic & mechanical VTE prophylaxis orders are present.        CODE STATUS:   Code Status (Patient has no pulse and is not breathing): CPR (Attempt to Resuscitate)  Medical Interventions (Patient has pulse or is breathing): Full Support      Electronically signed by Wendy Zee DO, 10/23/2024, 13:23 EDT.

## 2024-10-23 NOTE — PLAN OF CARE
Goal Outcome Evaluation:  Plan of Care Reviewed With: patient     Patient given prn pain medication per MAR. Expected discharge 10/24 per most recent discharge planning note. No new concerns at this time.

## 2024-10-23 NOTE — PLAN OF CARE
Problem: Adult Inpatient Plan of Care  Goal: Plan of Care Review  10/23/2024 1809 by Chely Perez RN  Outcome: Progressing  Flowsheets (Taken 10/23/2024 1809)  Progress: no change  Outcome Evaluation: NO CHANGES IN PATIENT'S  CONDITION , RESTING AFTER PAIN MEDS GIVEN,  Plan of Care Reviewed With: patient  10/23/2024 1714 by Chely Perez RN  Outcome: Progressing  Goal: Patient-Specific Goal (Individualized)  10/23/2024 1809 by Chely Perez RN  Outcome: Progressing  10/23/2024 1714 by Chely Perez RN  Outcome: Progressing  Goal: Absence of Hospital-Acquired Illness or Injury  10/23/2024 1809 by Chely Perez RN  Outcome: Progressing  10/23/2024 1714 by Chely Perez RN  Outcome: Progressing  Intervention: Identify and Manage Fall Risk  Recent Flowsheet Documentation  Taken 10/23/2024 1706 by Chely Perez RN  Safety Promotion/Fall Prevention: safety round/check completed  Taken 10/23/2024 0700 by Chely Perez RN  Safety Promotion/Fall Prevention: safety round/check completed  Intervention: Prevent Skin Injury  Recent Flowsheet Documentation  Taken 10/23/2024 0700 by Chely Perez RN  Body Position: position changed independently  Goal: Optimal Comfort and Wellbeing  10/23/2024 1809 by Chely Perez RN  Outcome: Progressing  10/23/2024 1714 by Chely Perez RN  Outcome: Progressing  Goal: Readiness for Transition of Care  10/23/2024 1809 by Chely Perez RN  Outcome: Progressing  10/23/2024 1714 by Chely Perez RN  Outcome: Progressing     Problem: Skin Injury Risk Increased  Goal: Skin Health and Integrity  10/23/2024 1809 by Chely Perez RN  Outcome: Progressing  10/23/2024 1714 by Chely Perez RN  Outcome: Progressing  Intervention: Optimize Skin Protection  Recent Flowsheet Documentation  Taken 10/23/2024 0700 by Chely Perez RN  Pressure Reduction Techniques: frequent weight shift encouraged  Pressure Reduction Devices: pressure-redistributing mattress utilized     Problem:  Pneumonia  Goal: Absence of Infection Signs and Symptoms  10/23/2024 1809 by Chely Perez RN  Outcome: Progressing  10/23/2024 1714 by Chely Perez RN  Outcome: Progressing  Goal: Effective Oxygenation and Ventilation  10/23/2024 1809 by Chely Perez RN  Outcome: Progressing  10/23/2024 1714 by Chely Perez RN  Outcome: Progressing     Problem: Fall Injury Risk  Goal: Absence of Fall and Fall-Related Injury  10/23/2024 1809 by Chely Perez RN  Outcome: Progressing  10/23/2024 1714 by Chely Perez RN  Outcome: Progressing  Intervention: Identify and Manage Contributors  Recent Flowsheet Documentation  Taken 10/23/2024 0700 by Chely Perez RN  Self-Care Promotion: independence encouraged  Intervention: Promote Injury-Free Environment  Recent Flowsheet Documentation  Taken 10/23/2024 1706 by Chely Perez RN  Safety Promotion/Fall Prevention: safety round/check completed  Taken 10/23/2024 0700 by Chely Perez RN  Safety Promotion/Fall Prevention: safety round/check completed     Problem: Pain Acute  Goal: Optimal Pain Control and Function  10/23/2024 1809 by Chely Perez RN  Outcome: Progressing  10/23/2024 1714 by Chely Perez RN  Outcome: Progressing  Intervention: Optimize Psychosocial Wellbeing  Recent Flowsheet Documentation  Taken 10/23/2024 0700 by Chely Perez RN  Supportive Measures: positive reinforcement provided  Diversional Activities:   television   smartphone  Intervention: Prevent or Manage Pain  Recent Flowsheet Documentation  Taken 10/23/2024 0700 by Chely Perez RN  Bowel Elimination Promotion: adequate fluid intake promoted  Sleep/Rest Enhancement: consistent schedule promoted   Goal Outcome Evaluation:  Plan of Care Reviewed With: patient        Progress: no change  Outcome Evaluation: NO CHANGES IN PATIENT'S  CONDITION , RESTING AFTER PAIN MEDS GIVEN,

## 2024-10-24 VITALS
DIASTOLIC BLOOD PRESSURE: 74 MMHG | OXYGEN SATURATION: 98 % | SYSTOLIC BLOOD PRESSURE: 101 MMHG | RESPIRATION RATE: 18 BRPM | BODY MASS INDEX: 43.83 KG/M2 | HEIGHT: 74 IN | TEMPERATURE: 98.4 F | HEART RATE: 71 BPM

## 2024-10-24 LAB — GLUCOSE BLDC GLUCOMTR-MCNC: 136 MG/DL (ref 70–99)

## 2024-10-24 PROCEDURE — 99239 HOSP IP/OBS DSCHRG MGMT >30: CPT | Performed by: STUDENT IN AN ORGANIZED HEALTH CARE EDUCATION/TRAINING PROGRAM

## 2024-10-24 PROCEDURE — 63710000001 APIXABAN 5 MG TABLET: Performed by: INTERNAL MEDICINE

## 2024-10-24 PROCEDURE — 82948 REAGENT STRIP/BLOOD GLUCOSE: CPT

## 2024-10-24 PROCEDURE — 63710000001 CETIRIZINE 10 MG TABLET: Performed by: INTERNAL MEDICINE

## 2024-10-24 PROCEDURE — A9270 NON-COVERED ITEM OR SERVICE: HCPCS | Performed by: INTERNAL MEDICINE

## 2024-10-24 PROCEDURE — 63710000001 IBUPROFEN 400 MG TABLET: Performed by: INTERNAL MEDICINE

## 2024-10-24 PROCEDURE — 63710000001 OXYCODONE-ACETAMINOPHEN 7.5-325 MG TABLET: Performed by: STUDENT IN AN ORGANIZED HEALTH CARE EDUCATION/TRAINING PROGRAM

## 2024-10-24 PROCEDURE — 63710000001 FAMOTIDINE 20 MG TABLET: Performed by: INTERNAL MEDICINE

## 2024-10-24 PROCEDURE — 63710000001 EMPAGLIFLOZIN 10 MG TABLET: Performed by: INTERNAL MEDICINE

## 2024-10-24 PROCEDURE — A9270 NON-COVERED ITEM OR SERVICE: HCPCS | Performed by: STUDENT IN AN ORGANIZED HEALTH CARE EDUCATION/TRAINING PROGRAM

## 2024-10-24 PROCEDURE — 63710000001 VANCOMYCIN 125 MG CAPSULE: Performed by: STUDENT IN AN ORGANIZED HEALTH CARE EDUCATION/TRAINING PROGRAM

## 2024-10-24 PROCEDURE — 63710000001 PREGABALIN 25 MG CAPSULE: Performed by: INTERNAL MEDICINE

## 2024-10-24 PROCEDURE — 63710000001 INSULIN GLARGINE PER 5 UNITS: Performed by: INTERNAL MEDICINE

## 2024-10-24 PROCEDURE — 63710000001 SACCHAROMYCES BOULARDII 250 MG CAPSULE: Performed by: INTERNAL MEDICINE

## 2024-10-24 PROCEDURE — 63710000001 BACLOFEN 10 MG TABLET: Performed by: INTERNAL MEDICINE

## 2024-10-24 PROCEDURE — 63710000001 METOPROLOL SUCCINATE XL 25 MG TABLET SUSTAINED-RELEASE 24 HOUR: Performed by: INTERNAL MEDICINE

## 2024-10-24 RX ORDER — BACLOFEN 10 MG/1
10 TABLET ORAL 3 TIMES DAILY PRN
Qty: 30 TABLET | Refills: 0 | Status: SHIPPED | OUTPATIENT
Start: 2024-10-24

## 2024-10-24 RX ADMIN — Medication: at 08:26

## 2024-10-24 RX ADMIN — DICLOFENAC SODIUM 4 G: 10 GEL TOPICAL at 10:04

## 2024-10-24 RX ADMIN — IBUPROFEN 400 MG: 400 TABLET, FILM COATED ORAL at 08:23

## 2024-10-24 RX ADMIN — CETIRIZINE HYDROCHLORIDE 10 MG: 10 TABLET, FILM COATED ORAL at 08:23

## 2024-10-24 RX ADMIN — EMPAGLIFLOZIN 10 MG: 10 TABLET, FILM COATED ORAL at 08:23

## 2024-10-24 RX ADMIN — OXYCODONE HYDROCHLORIDE AND ACETAMINOPHEN 1 TABLET: 7.5; 325 TABLET ORAL at 10:01

## 2024-10-24 RX ADMIN — OXYCODONE HYDROCHLORIDE AND ACETAMINOPHEN 1 TABLET: 7.5; 325 TABLET ORAL at 00:56

## 2024-10-24 RX ADMIN — PREGABALIN 25 MG: 25 CAPSULE ORAL at 08:24

## 2024-10-24 RX ADMIN — FAMOTIDINE 20 MG: 20 TABLET ORAL at 08:24

## 2024-10-24 RX ADMIN — APIXABAN 5 MG: 5 TABLET, FILM COATED ORAL at 08:24

## 2024-10-24 RX ADMIN — BACLOFEN 10 MG: 10 TABLET ORAL at 10:01

## 2024-10-24 RX ADMIN — METOPROLOL SUCCINATE 12.5 MG: 25 TABLET, FILM COATED, EXTENDED RELEASE ORAL at 08:24

## 2024-10-24 RX ADMIN — VANCOMYCIN HYDROCHLORIDE 125 MG: 125 CAPSULE ORAL at 00:56

## 2024-10-24 RX ADMIN — Medication 500 MG: at 08:24

## 2024-10-24 RX ADMIN — BACLOFEN 10 MG: 10 TABLET ORAL at 00:56

## 2024-10-24 RX ADMIN — VANCOMYCIN HYDROCHLORIDE 125 MG: 125 CAPSULE ORAL at 05:58

## 2024-10-24 RX ADMIN — INSULIN GLARGINE 25 UNITS: 100 INJECTION, SOLUTION SUBCUTANEOUS at 08:23

## 2024-10-24 NOTE — PLAN OF CARE
Goal Outcome Evaluation:  Plan of Care Reviewed With: patient           Outcome Evaluation: Pt requests pain medication on schedule. Has had soft stool this shift.  Pt to be discharged to home today.

## 2024-10-24 NOTE — NURSING NOTE
Patient alert, oriented, and willing to talk about current discharge plan at time of leadership rounding. At this time patient remains agreeable to discharge plan and voices no concerns regarding this plan. Plan to dc this afternoon will update note if needed.

## 2024-10-24 NOTE — DISCHARGE INSTR - OTHER ORDERS
[facility] will be performing telehealth visits, once you have returned home please call [phone] for first visit.    [facility] is scheduled to visit on Friday, 10/25/24 anywhere between 10:00 a.m. - 12:00 p.m.   They are to call you prior to visit.   Contact number is [phone]    [employer] will provide caregiver services and will be at your residence day of discharge.   Their contact number is [phone].     [employer] has provided your medical equipment, should you need anything in addition after having been home, please call them at [phone].     Medicaid Waiver Service application for additional caregivers through your Medicaid insurance has been submitted on your behalf, you can call their service number at [phone] to check on status of application.

## 2024-10-24 NOTE — DISCHARGE SUMMARY
[facility]         HOSPITALIST  DISCHARGE SUMMARY    Patient Name: Jose Shaikh  : 1960  MRN: [id number]    Date of Admission: 10/14/2024  Date of Discharge:  10/24/2024    Primary Care Physician: Delia Cervantes APRN    Consults       Date and Time Order Name Status Description    2024  1:01 PM Inpatient Podiatry Consult Completed             Active and Resolved Hospital Problems:  Active Hospital Problems    Diagnosis POA    **COVID-19 virus infection [U07.1] Yes    COVID-19 [U07.1] Yes      Resolved Hospital Problems   No resolved problems to display.       Hospital Course     Hospital Course:  Jose Shaikh is a 64 y.o. male who was transferred to [facility] acute care after testing positive for COVID-19 at [facility]. Patient was initially hospitalized on 09/10/2023 and had a prolonged hospitalization for treatment and management of generalized weakness, deconditioning acute issues of diarrhea. Exhaustive efforts were done and attempt to find a facility that would accept patient after discharge, however, these were unsuccessful. Patient had no further acute needs or requirements for inpatient monitoring and management and he was hemodynamically stable and so patient was eventually transferred to [facility]. Patient's progress has been complicated by his noncompliance with therapy and unwillingness to work with physical therapy/Occupational Therapy. During his stay patient has been treated with IV diuresis and he had also been started on Ozempic therapy to aid in weight loss so patient could possibly pursue joint placement therapy. Patient evaluated by 3 different orthopedic surgeons including referral to [facility] and all of them advised that the patient need to continue losing weight in order to be considered for surgery and patient need to have a weight of around 280 pounds. Patient continually ordered [employer] food while in [facility], did not work well with skilled therapy services and was consistently noncompliant with medical recommendations. Patient tested positive for COVID-19 on 10/14/2024 and per [facility] policy patient was transferred to acute care at [facility]. Patient was not hypoxic and was asymptomatic from COVID-19, no therapies for COVID-19 were initiated. Echocardiogram was obtained for evaluation of possible heart failure it showed no LV dysfunction, diastolic function difficult to ascertain per cardiology but patient may be with grade 1 diastolic dysfunction. Patient with edema in extremities but less evidence of any underlying heart failure otherwise. Edema may be contributed due to his low albumin level.  He developed frequent diarrhea episodes.  Tested PCR positive for C. difficile but antigen was negative, WBC was normal, he had no fever.  However, he did become hypotensive which resolved with IV fluids.  He was started on p.o. vancomycin for a 10 day course.  He was provided with home equipment.  He will be provided with 30 days and home care until his Medicaid home care application is approved.    Patient discharged in stable condition.    DISCHARGE Follow Up Recommendations for labs and diagnostics: Follow-up with telehealth PCP appointment made.      Day of Discharge     Vital Signs:  Temp:  [97.9 °F (36.6 °C)-98.4 °F (36.9 °C)] 98.4 °F (36.9 °C)  Heart Rate:  [67-74] 71  Resp:  [16-18] 18  BP: ()/(50-74) 101/74    Physical Exam:   Gen: NAD, Alert and Oriented, obese  Cards: RRR, no murmur   Pulm: CTA b/l, no wheezing  Abd: soft, nondistended  Extremities: no pitting edema      Discharge Details        Discharge Medications        New Medications        Instructions Start Date   baclofen 10 MG tablet  Commonly known as: LIORESAL   10 mg, Oral, 3 Times Daily PRN      Jardiance 10 MG tablet tablet  Generic drug: empagliflozin   10 mg, Oral, Daily      metoprolol succinate  XL 25 MG 24 hr tablet  Commonly known as: TOPROL-XL   12.5 mg, Oral, Every 24 Hours Scheduled      oxyCODONE-acetaminophen 7.5-325 MG per tablet  Commonly known as: PERCOCET   1 tablet, Oral, Every 6 Hours PRN      sertraline 50 MG tablet  Commonly known as: ZOLOFT   50 mg, Oral, Nightly      vancomycin 125 MG capsule  Commonly known as: VANCOCIN   125 mg, Oral, Every 6 Hours Scheduled             Changes to Medications        Instructions Start Date   HumaLOG KwikPen 100 UNIT/ML solution pen-injector  Generic drug: Insulin Lispro (1 Unit Dial)  What changed: See the new instructions.   Inject subcutaneously 3 times a day with meals. Use sliding scale. Max daily dose 45units Blood Glucose 150-199- 3units 200-249- 5units 250-299- 8 units 300-349- 10units 350-400- 12units >400- 14units & Call Provider      Levemir FlexPen 100 UNIT/ML injection  Generic drug: insulin detemir  What changed: how much to take   40 Units, Subcutaneous, 2 Times Daily             Continue These Medications        Instructions Start Date   acetaminophen 650 MG 8 hr tablet  Commonly known as: TYLENOL   650 mg, Oral, Every 8 Hours PRN      apixaban 5 MG tablet tablet  Commonly known as: ELIQUIS   5 mg, Oral, Every 12 Hours Scheduled      digoxin 125 MCG tablet  Commonly known as: LANOXIN   125 mcg, Oral, Daily Digoxin      pregabalin 25 MG capsule  Commonly known as: LYRICA   25 mg, Oral, 3 Times Daily      simvastatin 20 MG tablet  Commonly known as: ZOCOR   20 mg, Oral, Nightly             Stop These Medications      HYDROcodone-acetaminophen 7.5-325 MG per tablet  Commonly known as: NORCO     lisinopril 2.5 MG tablet  Commonly known as: PRINIVIL,ZESTRIL     naloxone 4 MG/0.1ML nasal spray  Commonly known as: NARCAN     Ozempic (0.25 or 0.5 MG/DOSE) 2 MG/3ML solution pen-injector  Generic drug: Semaglutide(0.25 or 0.5MG/DOS)     Ozempic (1 MG/DOSE) 4 MG/3ML solution pen-injector  Generic drug: Semaglutide (1 MG/DOSE)     Ozempic (2 MG/DOSE) 8  MG/3ML solution pen-injector  Generic drug: Semaglutide (2 MG/DOSE)              Allergies   Allergen Reactions    Adhesive Tape Rash       Discharge Disposition:  Home-Health Care Svc    Diet:  Hospital:No active diet order      Discharge Activity:       CODE STATUS:  Code Status and Medical Interventions: CPR (Attempt to Resuscitate); Full Support   Ordered at: 10/14/24 2049     Code Status (Patient has no pulse and is not breathing):    CPR (Attempt to Resuscitate)     Medical Interventions (Patient has pulse or is breathing):    Full Support         No future appointments.    Additional Instructions for the Follow-ups that You Need to Schedule       Discharge Follow-up with PCP   As directed       Currently Documented PCP:    Delia Cervantes APRN    PCP Phone Number:    [phone]     Follow Up Details: one week - pt has tele health appointment                Pertinent  and/or Most Recent Results         LAB RESULTS:      Lab 10/22/24  0856   WBC 5.70   HEMOGLOBIN 9.4*   HEMATOCRIT 29.7*   PLATELETS 106*   NEUTROS ABS 3.91   IMMATURE GRANS (ABS) 0.01   LYMPHS ABS 0.93   MONOS ABS 0.68   EOS ABS 0.13   MCV 82.0                             Brief Urine Lab Results  (Last result in the past 365 days)        Color   Clarity   Blood   Leuk Est   Nitrite   Protein   CREAT   Urine HCG        09/15/24 1741 Yellow   Cloudy   Negative   Negative   Negative   Negative                 Microbiology Results (last 10 days)       Procedure Component Value - Date/Time    Clostridioides difficile Toxin, PCR - Stool, Per Rectum [[id number]]  (Abnormal) Collected: 10/21/24 2130    Lab Status: Final result Specimen: Stool from Per Rectum Updated: 10/21/24 2258     Toxigenic C. difficile by PCR Positive     027 Toxin Presumptive Negative    Narrative:      DNA from a toxigenic strain of C.difficile has been detected. Antigen testing for the presence of free C.difficile toxin is currently in progress, to help determine the clinical  significance of this PCR result.     Clostridioides difficile toxin Ag, Reflex - Stool, Per Rectum [[id number]]  (Normal) Collected: 10/21/24 2130    Lab Status: Final result Specimen: Stool from Per Rectum Updated: 10/21/24 2256     C.diff Toxin Ag Negative    Narrative:      DNA from a toxigenic strain of C.difficile was detected, although the free toxin itself was not detected. These findings are consistent with C.difficile colonization and may not reflect actual C.difficile infection. Clinical correlation needed.    NIRAV AURIS SCREEN - Swab, Axilla Right, Axilla Left and Groin [[id number]]  (Normal) Collected: 10/15/24 0653    Lab Status: Final result Specimen: Swab from Axilla Right, Axilla Left and Groin Updated: 10/20/24 1228     Candida Auris Screen Culture No Candida auris isolated at 5 days            No radiology results for the last 7 days         Results for orders placed during the hospital encounter of 10/14/24    Adult Transthoracic Echo Complete W/ Cont if Necessary Per Protocol    Interpretation Summary    Left ventricular systolic function is normal. Left ventricular ejection fraction appears to be 61 - 65%.    Left ventricular wall thickness is consistent with borderline concentric hypertrophy.    Left ventricular diastolic function is consistent with (grade I) impaired relaxation.    There are no hemodynamically significant valvular abnormalities.    This is a technically difficult study.  Contrast used for better endocardial definition.              Time spent on Discharge including face to face service:  >30 minutes    Electronically signed by Wendy Zee DO, 10/24/24, 3:22 PM EDT.

## 2024-10-24 NOTE — PLAN OF CARE
Goal Outcome Evaluation:  Plan of Care Reviewed With: patient        Progress: improving  Outcome Evaluation: PT a/ox4. Room air. Pain treated as per MAR. Pt being discharged home. Education completed. Meds retrieved from pharmacy. Completed plan of care.

## 2024-10-26 ENCOUNTER — APPOINTMENT (OUTPATIENT)
Dept: GENERAL RADIOLOGY | Facility: HOSPITAL | Age: 64
End: 2024-10-26
Payer: MEDICARE

## 2024-10-26 ENCOUNTER — HOSPITAL ENCOUNTER (EMERGENCY)
Facility: HOSPITAL | Age: 64
Discharge: HOME | End: 2024-10-27
Attending: EMERGENCY MEDICINE | Admitting: EMERGENCY MEDICINE
Payer: MEDICARE

## 2024-10-26 DIAGNOSIS — M25.552 CHRONIC LEFT HIP PAIN: ICD-10-CM

## 2024-10-26 DIAGNOSIS — G89.29 CHRONIC LEFT HIP PAIN: ICD-10-CM

## 2024-10-26 DIAGNOSIS — R53.1 GENERALIZED WEAKNESS: Primary | ICD-10-CM

## 2024-10-26 LAB
ALBUMIN SERPL-MCNC: 2.9 G/DL (ref 3.5–5.2)
ALBUMIN/GLOB SERPL: 1 G/DL
ALP SERPL-CCNC: 170 U/L (ref 39–117)
ALT SERPL W P-5'-P-CCNC: 21 U/L (ref 1–41)
ANION GAP SERPL CALCULATED.3IONS-SCNC: 8.4 MMOL/L (ref 5–15)
AST SERPL-CCNC: 26 U/L (ref 1–40)
BASOPHILS # BLD AUTO: 0.04 10*3/MM3 (ref 0–0.2)
BASOPHILS NFR BLD AUTO: 0.7 % (ref 0–1.5)
BILIRUB SERPL-MCNC: 0.9 MG/DL (ref 0–1.2)
BILIRUB UR QL STRIP: NEGATIVE
BUN SERPL-MCNC: 11 MG/DL (ref 8–23)
BUN/CREAT SERPL: 20.8 (ref 7–25)
CALCIUM SPEC-SCNC: 8.2 MG/DL (ref 8.6–10.5)
CHLORIDE SERPL-SCNC: 104 MMOL/L (ref 98–107)
CLARITY UR: CLEAR
CO2 SERPL-SCNC: 22.6 MMOL/L (ref 22–29)
COLOR UR: YELLOW
CREAT SERPL-MCNC: 0.53 MG/DL (ref 0.76–1.27)
DEPRECATED RDW RBC AUTO: 48.3 FL (ref 37–54)
DIGOXIN SERPL-MCNC: 0.32 NG/ML (ref 0.6–1.2)
EGFRCR SERPLBLD CKD-EPI 2021: 110.5 ML/MIN/1.73
EOSINOPHIL # BLD AUTO: 0.07 10*3/MM3 (ref 0–0.4)
EOSINOPHIL NFR BLD AUTO: 1.2 % (ref 0.3–6.2)
ERYTHROCYTE [DISTWIDTH] IN BLOOD BY AUTOMATED COUNT: 15.8 % (ref 12.3–15.4)
GLOBULIN UR ELPH-MCNC: 2.9 GM/DL
GLUCOSE BLDC GLUCOMTR-MCNC: 100 MG/DL (ref 70–99)
GLUCOSE SERPL-MCNC: 112 MG/DL (ref 65–99)
GLUCOSE UR STRIP-MCNC: ABNORMAL MG/DL
HCT VFR BLD AUTO: 30.7 % (ref 37.5–51)
HGB BLD-MCNC: 9.5 G/DL (ref 13–17.7)
HGB UR QL STRIP.AUTO: NEGATIVE
HOLD SPECIMEN: NORMAL
IMM GRANULOCYTES # BLD AUTO: 0.02 10*3/MM3 (ref 0–0.05)
IMM GRANULOCYTES NFR BLD AUTO: 0.3 % (ref 0–0.5)
KETONES UR QL STRIP: ABNORMAL
LEUKOCYTE ESTERASE UR QL STRIP.AUTO: NEGATIVE
LYMPHOCYTES # BLD AUTO: 0.83 10*3/MM3 (ref 0.7–3.1)
LYMPHOCYTES NFR BLD AUTO: 13.7 % (ref 19.6–45.3)
MCH RBC QN AUTO: 25.5 PG (ref 26.6–33)
MCHC RBC AUTO-ENTMCNC: 30.9 G/DL (ref 31.5–35.7)
MCV RBC AUTO: 82.5 FL (ref 79–97)
MONOCYTES # BLD AUTO: 0.62 10*3/MM3 (ref 0.1–0.9)
MONOCYTES NFR BLD AUTO: 10.2 % (ref 5–12)
NEUTROPHILS NFR BLD AUTO: 4.5 10*3/MM3 (ref 1.7–7)
NEUTROPHILS NFR BLD AUTO: 73.9 % (ref 42.7–76)
NITRITE UR QL STRIP: NEGATIVE
NRBC BLD AUTO-RTO: 0 /100 WBC (ref 0–0.2)
PH UR STRIP.AUTO: 5.5 [PH] (ref 5–8)
PLATELET # BLD AUTO: 109 10*3/MM3 (ref 140–450)
PMV BLD AUTO: 11.3 FL (ref 6–12)
POTASSIUM SERPL-SCNC: 3.8 MMOL/L (ref 3.5–5.2)
PROT SERPL-MCNC: 5.8 G/DL (ref 6–8.5)
PROT UR QL STRIP: NEGATIVE
RBC # BLD AUTO: 3.72 10*6/MM3 (ref 4.14–5.8)
SODIUM SERPL-SCNC: 135 MMOL/L (ref 136–145)
SP GR UR STRIP: >1.03 (ref 1–1.03)
UROBILINOGEN UR QL STRIP: ABNORMAL
WBC NRBC COR # BLD AUTO: 6.08 10*3/MM3 (ref 3.4–10.8)
WHOLE BLOOD HOLD COAG: NORMAL

## 2024-10-26 PROCEDURE — 80053 COMPREHEN METABOLIC PANEL: CPT | Performed by: EMERGENCY MEDICINE

## 2024-10-26 PROCEDURE — 82948 REAGENT STRIP/BLOOD GLUCOSE: CPT

## 2024-10-26 PROCEDURE — 81003 URINALYSIS AUTO W/O SCOPE: CPT | Performed by: EMERGENCY MEDICINE

## 2024-10-26 PROCEDURE — 80162 ASSAY OF DIGOXIN TOTAL: CPT | Performed by: EMERGENCY MEDICINE

## 2024-10-26 PROCEDURE — 99283 EMERGENCY DEPT VISIT LOW MDM: CPT

## 2024-10-26 PROCEDURE — 96375 TX/PRO/DX INJ NEW DRUG ADDON: CPT

## 2024-10-26 PROCEDURE — 25010000002 ONDANSETRON PER 1 MG: Performed by: EMERGENCY MEDICINE

## 2024-10-26 PROCEDURE — 85025 COMPLETE CBC W/AUTO DIFF WBC: CPT | Performed by: EMERGENCY MEDICINE

## 2024-10-26 PROCEDURE — 96374 THER/PROPH/DIAG INJ IV PUSH: CPT

## 2024-10-26 PROCEDURE — 25010000002 HYDROMORPHONE 1 MG/ML SOLUTION: Performed by: EMERGENCY MEDICINE

## 2024-10-26 PROCEDURE — 71045 X-RAY EXAM CHEST 1 VIEW: CPT

## 2024-10-26 RX ORDER — SODIUM CHLORIDE 0.9 % (FLUSH) 0.9 %
10 SYRINGE (ML) INJECTION AS NEEDED
Status: DISCONTINUED | OUTPATIENT
Start: 2024-10-26 | End: 2024-10-27 | Stop reason: HOSPADM

## 2024-10-26 RX ORDER — ONDANSETRON 2 MG/ML
4 INJECTION INTRAMUSCULAR; INTRAVENOUS ONCE
Status: COMPLETED | OUTPATIENT
Start: 2024-10-26 | End: 2024-10-26

## 2024-10-26 RX ADMIN — ONDANSETRON 4 MG: 2 INJECTION INTRAMUSCULAR; INTRAVENOUS at 14:19

## 2024-10-26 RX ADMIN — HYDROMORPHONE HYDROCHLORIDE 1 MG: 1 INJECTION, SOLUTION INTRAMUSCULAR; INTRAVENOUS; SUBCUTANEOUS at 14:19

## 2024-10-26 NOTE — ED PROVIDER NOTES
Time: 7:15 AM EDT  Date of encounter:  10/26/2024  Independent Historian/Clinical History and Information was obtained by:   Patient    History is limited by: N/A    Chief Complaint: Unable to care for self      History of Present Illness:  Patient is a 64 y.o. year old male who presents to the emergency department for evaluation of inability to care for self and generalized weakness.  Patient was discharged 2 days ago from this hospital after extended hospitalization from 10/1 untill 10/14 and then 10/14 untill 1024.  Patient had been in skilled nursing unit essentially the entire prior year.  He recently had COVID and C. difficile.  Patient has home health care place but available.  He denies any new medical issues.  He is complaining of chronic left hip pain and has been evaluated by several orthopedic surgeons all of whom he needs a hip replacement but due to his current weight there unwilling to proceed with surgery and he is aware that weight loss is necessary prior to him being offered surgery.      Patient Care Team  Primary Care Provider: Delia Cervantes APRN    Past Medical History:     Allergies   Allergen Reactions    Adhesive Tape Rash     Past Medical History:   Diagnosis Date    Absence of toe of right foot     Acute osteomyelitis of left calcaneus  08/18/2021    Anxiety and depression     Arthritis     Cancer     Chronic pain     STATES HIS PAIN IS 10/10 AAT    Claustrophobia     Corns and callus     Diabetic ulcer of left heel associated with type 2 DM 08/18/2021    Diabetic ulcer of left heel associated with type 2 DM 07/06/2021    Diabetic ulcer of right midfoot associated with type 2 DM 08/18/2021    Difficulty walking     Essential hypertension 08/31/2021    Hammertoe     Hyperlipidemia LDL goal <100 08/31/2021    Ingrown toenail     Obesity     Paroxysmal atrial fibrillation 08/31/2021    Polyneuropathy     Pressure ulcer, stage 1     Tinea unguium     Type 2 diabetes mellitus with  polyneuropathy      Past Surgical History:   Procedure Laterality Date    CYST REMOVAL      center of back; benign    EYE SURGERY      INCISION AND DRAINAGE ABSCESS      back    INCISION AND DRAINAGE LEG Right 12/10/2021    Procedure: INCISION AND DRAINAGE LOWER EXTREMITY;  Surgeon: Ash Leyva DPM;  Location: [facility] MAIN OR;  Service: Podiatry;  Laterality: Right;    OTHER SURGICAL HISTORY      Surgical clips left foot    TOE SURGERY Right     Removal of 5th toe    TRANS METATARSAL AMPUTATION Right 12/02/2021    Procedure: AMPUTATION TRANS METATARSAL;  Surgeon: Ash Leyva DPM;  Location: [facility] MAIN OR;  Service: Podiatry;  Laterality: Right;    VASCULAR SURGERY      WRIST SURGERY Left     repair of injury     Family History   Problem Relation Age of Onset    Hearing loss Mother     Depression Mother     Arthritis Mother     Heart disease Mother     Heart disease Father     Cancer Father         Unspecified    Cancer Sister     Hearing loss Brother     Heart disease Brother     Diabetes Paternal Grandmother     Learning disabilities Nephew     Drug abuse Nephew     COPD Nephew     Alcohol abuse Nephew        Home Medications:  Prior to Admission medications    Medication Sig Start Date End Date Taking? Authorizing Provider   acetaminophen (TYLENOL) 650 MG 8 hr tablet Take 1 tablet by mouth Every 8 (Eight) Hours As Needed for Mild Pain.    Provider, MD Laura   apixaban (ELIQUIS) 5 MG tablet tablet Take 1 tablet by mouth Every 12 (Twelve) Hours. 1/31/23   Delia Cervantes APRN   baclofen (LIORESAL) 10 MG tablet Take 1 tablet by mouth 3 (Three) Times a Day As Needed for Muscle Spasms. 10/24/24   Wendy Zee DO   digoxin (LANOXIN) 125 MCG tablet Take 1 tablet by mouth Daily for 30 days. 6/15/23 9/10/23  Joe Haynes MD   empagliflozin (JARDIANCE) 10 MG tablet tablet Take 1 tablet by mouth Daily. 10/24/24   Wendy Zee DO   insulin detemir (Levemir FlexPen)  100 UNIT/ML injection Inject 40 Units under the skin into the appropriate area as directed 2 (Two) Times a Day for 30 days.  Patient taking differently: Inject 60 Units under the skin into the appropriate area as directed 2 (Two) Times a Day. 6/23/23 9/10/23  Cailin Acosta MD   Insulin Lispro, 1 Unit Dial, (HumaLOG KwikPen) 100 UNIT/ML solution pen-injector Inject subcutaneously 3 times a day with meals. Use sliding scale. Max daily dose 45units Blood Glucose 150-199- 3units 200-249- 5units 250-299- 8 units 300-349- 10units 350-400- 12units >400- 14units & Call Provider 10/23/24   Wendy Zee DO   metoprolol succinate XL (TOPROL-XL) 25 MG 24 hr tablet Take 0.5 tablets by mouth Daily. 10/24/24   Wendy Zee DO   oxyCODONE-acetaminophen (PERCOCET) 7.5-325 MG per tablet Take 1 tablet by mouth Every 6 (Six) Hours As Needed for Moderate Pain or Severe Pain for up to 3 days. 10/23/24 10/27/24  Wendy Zee DO   pregabalin (LYRICA) 25 MG capsule Take 1 capsule by mouth 3 (Three) Times a Day for 3 days. 6/15/23 9/10/23  Joe Haynes MD   sertraline (ZOLOFT) 50 MG tablet Take 1 tablet by mouth Every Night. 10/23/24   Wendy Zee DO   simvastatin (ZOCOR) 20 MG tablet Take 1 tablet by mouth Every Night. 23   Delia Cervantes APRN   vancomycin (VANCOCIN) 125 MG capsule Take 1 capsule by mouth Every 6 (Six) Hours for 39 doses. Indications: Colon Inflammation due to Clostridium Bacteria Overgrowth 10/23/24 11/3/24  Wendy Zee DO        Social History:   Social History     Tobacco Use    Smoking status: Former     Current packs/day: 0.00     Types: Cigarettes     Quit date: 1990     Years since quittin.1    Smokeless tobacco: Never    Tobacco comments:     quit at age 30   Vaping Use    Vaping status: Never Used   Substance Use Topics    Alcohol use: Not Currently     Comment: rare    Drug use: Not Currently     Types: Marijuana      "Comment: occasionally, EVERY 3-4 MONTH         Review of Systems:  Review of Systems   Musculoskeletal:  Positive for arthralgias and gait problem.        Physical Exam:  /48   Pulse 81   Temp 98.7 °F (37.1 °C) (Oral)   Resp 16   Ht 188 cm (74\")   Wt (!) 167 kg (368 lb 2.7 oz)   SpO2 100%   BMI 47.27 kg/m²     Physical Exam  Vitals and nursing note reviewed.   Constitutional:       General: He is not in acute distress.     Appearance: He is obese.   Eyes:      Extraocular Movements: Extraocular movements intact.   Cardiovascular:      Rate and Rhythm: Normal rate and regular rhythm.      Heart sounds: Normal heart sounds.   Pulmonary:      Effort: Pulmonary effort is normal. No respiratory distress.      Breath sounds: Normal breath sounds.   Abdominal:      General: Abdomen is flat.      Palpations: Abdomen is soft.      Tenderness: There is no abdominal tenderness.   Musculoskeletal:      Cervical back: Normal range of motion.   Skin:     General: Skin is warm and dry.   Neurological:      General: No focal deficit present.      Mental Status: He is alert and oriented to person, place, and time.                  Procedures:  Procedures      Medical Decision Making:      Comorbidities that affect care:    Morbid obesity, diabetes, atrial fibrillation, hypertension    External Notes reviewed:    Hospital Discharge Summary: Discharge summary from this hospital follow-up 10/24/2024 was reviewed      The following orders were placed and all results were independently analyzed by me:  Orders Placed This Encounter   Procedures    XR Chest 1 View    Comprehensive Metabolic Panel    Urinalysis With Microscopic If Indicated (No Culture) - Urine, Clean Catch    Digoxin Level    CBC Auto Differential    POC Glucose Once    CBC & Differential    Extra Tubes    Gold Top - SST    Light Blue Top       Medications Given in the Emergency Department:  Medications   HYDROmorphone (DILAUDID) injection 1 mg (1 mg " Intravenous Given 10/26/24 1419)   ondansetron (ZOFRAN) injection 4 mg (4 mg Intravenous Given 10/26/24 1419)   ibuprofen (ADVIL,MOTRIN) tablet 400 mg (400 mg Oral Given 10/27/24 0229)   ondansetron (ZOFRAN) injection 4 mg (4 mg Intravenous Given 10/27/24 0229)        ED Course:         Labs:    Lab Results (last 24 hours)       Procedure Component Value Units Date/Time    POC Glucose Once [[id number]]  (Abnormal) Collected: 10/26/24 2154    Specimen: Blood Updated: 10/26/24 2156     Glucose 100 mg/dL      Comment: Serial Number: [id number]:  [id number]                Imaging:    No Radiology Exams Resulted Within Past 24 Hours      Differential Diagnosis and Discussion:    Weakness: Based on the patient's history, signs, and symptoms, the diffential diagnosis includes but is not limited to meningitis, stroke, sepsis, subarachnoid hemorrhage, intracranial bleeding, encephalitis, acute uti, dehydration, MS, myasthenia gravis, Gu[place]an [place], migraine variant, neuromuscular disorders vertigo, electrolyte imbalance, and metabolic disorders.    All labs were reviewed and interpreted by me.  All X-rays impressions were independently interpreted by me.    MDM  On arrival patient had no new medical conditions or complaints.  States his reason for coming to the emergency department was because he does not feel that he can care for himself.  Lab work including blood work, urinalysis and chest x-ray were all obtained.  Patient's findings are similar to his prior baselines.  Patient does not appear to have any new acute medical condition.  This was all discussed with Dr. Maya, hospitalist, who states there does not appear to be any medical necessity for criteria met by the patient for inpatient hospitalization.   He contacted case management for their input.  Case management put extreme effort into finding placement for this patient prior to his hospital discharge but due to multiple factors they were unable to find any  accepting facility.Today they have made arrangements for the patient to have 16 hours/day of home health care which will gradually decrease each week going forward.  This is anticipated to start Monday ED  has been involved case patient without being there tomorrow further previously scheduled time and if possible for him to be involved with him at home today.                Patient Care Considerations:  Consider admitting this patient, however, the patient does not meet any current inpatient criteria.      Consultants/Shared Management Plan:    Hospitalist: I have discussed the case with Dr. Maya who states after reviewed the patient does not meet inpatient criteria and ultimately requests discharge.    Social Determinants of Health:  Patient has his own apartment with all of the necessary medical equipment i.e. commode, walker, etc. and home health will provide services as detailed above.        Disposition and Care Coordination:    Discharged: The patient is suitable and stable for discharge with no need for consideration of admission.    I have explained the patient´s condition, diagnoses and treatment plan based on the information available to me at this time. I have answered questions and addressed any concerns. The patient has a good  understanding of the patient´s diagnosis, condition, and treatment plan as can be expected at this point. The vital signs have been stable. The patient´s condition is stable and appropriate for discharge from the emergency department.      The patient will pursue further outpatient evaluation with the primary care physician or other designated or consulting physician as outlined in the discharge instructions. They are agreeable to this plan of care and follow-up instructions have been explained in detail. The patient has received these instructions in written format and has expressed an understanding of the discharge instructions. The patient is aware that any  significant change in condition or worsening of symptoms should prompt an immediate return to this or the closest emergency department or call to 911.    Final diagnoses:   Generalized weakness   Chronic left hip pain        ED Disposition       ED Disposition   Discharge    Condition   Stable    Comment   --               This medical record created using voice recognition software.             Domenico Contreras DO  10/27/24 1022

## 2024-10-26 NOTE — ED NOTES
Pt had extended amount  (3days pt mentioned )of Urine and Feces upon arrival from EMS  Me and Haroon RN did the best we could upon changing the patient and cleaning  due to the pts large weight and pt in pain we could only turn pt  longterm and cleanb - pt cannot take care of himself @home and said Homehealth is of no real assistance to him  -    Pt is Cdiff+    Pt mentioned to me having cockroaches at home in his conversation -  passed info to RN to give on next shift change to contact

## 2024-10-27 VITALS
OXYGEN SATURATION: 100 % | DIASTOLIC BLOOD PRESSURE: 48 MMHG | HEIGHT: 74 IN | SYSTOLIC BLOOD PRESSURE: 113 MMHG | RESPIRATION RATE: 16 BRPM | BODY MASS INDEX: 40.43 KG/M2 | HEART RATE: 81 BPM | TEMPERATURE: 98.7 F | WEIGHT: 315 LBS

## 2024-10-27 PROCEDURE — 96376 TX/PRO/DX INJ SAME DRUG ADON: CPT

## 2024-10-27 PROCEDURE — 25010000002 ONDANSETRON PER 1 MG: Performed by: EMERGENCY MEDICINE

## 2024-10-27 RX ORDER — ONDANSETRON 2 MG/ML
4 INJECTION INTRAMUSCULAR; INTRAVENOUS ONCE
Status: COMPLETED | OUTPATIENT
Start: 2024-10-27 | End: 2024-10-27

## 2024-10-27 RX ORDER — IBUPROFEN 400 MG/1
400 TABLET, FILM COATED ORAL ONCE
Status: COMPLETED | OUTPATIENT
Start: 2024-10-27 | End: 2024-10-27

## 2024-10-27 RX ADMIN — ONDANSETRON 4 MG: 2 INJECTION INTRAMUSCULAR; INTRAVENOUS at 02:29

## 2024-10-27 RX ADMIN — IBUPROFEN 400 MG: 400 TABLET ORAL at 02:29

## 2024-10-27 NOTE — ED NOTES
Pt refusing vitals at this time; appropriate drink provided for pt as requested. Call light within reach. Pt verbalized understanding of appropriate use of call light for assistance and will refrain for yelling for hospital staff

## 2024-11-08 ENCOUNTER — HOSPITAL ENCOUNTER (EMERGENCY)
Facility: HOSPITAL | Age: 64
Discharge: HOME | End: 2024-11-09
Attending: EMERGENCY MEDICINE
Payer: MEDICARE

## 2024-11-08 VITALS
HEART RATE: 80 BPM | RESPIRATION RATE: 18 BRPM | OXYGEN SATURATION: 100 % | TEMPERATURE: 97.8 F | DIASTOLIC BLOOD PRESSURE: 58 MMHG | SYSTOLIC BLOOD PRESSURE: 110 MMHG | WEIGHT: 315 LBS | BODY MASS INDEX: 40.43 KG/M2 | HEIGHT: 74 IN

## 2024-11-08 DIAGNOSIS — R53.1 WEAKNESS: Primary | ICD-10-CM

## 2024-11-08 LAB
ALBUMIN SERPL-MCNC: 2.9 G/DL (ref 3.5–5.2)
ALBUMIN/GLOB SERPL: 1 G/DL
ALP SERPL-CCNC: 146 U/L (ref 39–117)
ALT SERPL W P-5'-P-CCNC: 21 U/L (ref 1–41)
ANION GAP SERPL CALCULATED.3IONS-SCNC: 7.6 MMOL/L (ref 5–15)
AST SERPL-CCNC: 29 U/L (ref 1–40)
BASOPHILS # BLD AUTO: 0.03 10*3/MM3 (ref 0–0.2)
BASOPHILS NFR BLD AUTO: 0.7 % (ref 0–1.5)
BILIRUB SERPL-MCNC: 1 MG/DL (ref 0–1.2)
BILIRUB UR QL STRIP: NEGATIVE
BUN SERPL-MCNC: 8 MG/DL (ref 8–23)
BUN/CREAT SERPL: 17.4 (ref 7–25)
CALCIUM SPEC-SCNC: 8.2 MG/DL (ref 8.6–10.5)
CHLORIDE SERPL-SCNC: 106 MMOL/L (ref 98–107)
CLARITY UR: CLEAR
CO2 SERPL-SCNC: 25.4 MMOL/L (ref 22–29)
COLOR UR: YELLOW
CREAT SERPL-MCNC: 0.46 MG/DL (ref 0.76–1.27)
DEPRECATED RDW RBC AUTO: 47 FL (ref 37–54)
EGFRCR SERPLBLD CKD-EPI 2021: 115.4 ML/MIN/1.73
EOSINOPHIL # BLD AUTO: 0.08 10*3/MM3 (ref 0–0.4)
EOSINOPHIL NFR BLD AUTO: 1.9 % (ref 0.3–6.2)
ERYTHROCYTE [DISTWIDTH] IN BLOOD BY AUTOMATED COUNT: 16.4 % (ref 12.3–15.4)
GLOBULIN UR ELPH-MCNC: 2.8 GM/DL
GLUCOSE SERPL-MCNC: 114 MG/DL (ref 65–99)
GLUCOSE UR STRIP-MCNC: ABNORMAL MG/DL
HCT VFR BLD AUTO: 32.3 % (ref 37.5–51)
HGB BLD-MCNC: 9.6 G/DL (ref 13–17.7)
HGB UR QL STRIP.AUTO: NEGATIVE
HOLD SPECIMEN: NORMAL
HOLD SPECIMEN: NORMAL
IMM GRANULOCYTES # BLD AUTO: 0.01 10*3/MM3 (ref 0–0.05)
IMM GRANULOCYTES NFR BLD AUTO: 0.2 % (ref 0–0.5)
KETONES UR QL STRIP: ABNORMAL
LEUKOCYTE ESTERASE UR QL STRIP.AUTO: NEGATIVE
LYMPHOCYTES # BLD AUTO: 0.75 10*3/MM3 (ref 0.7–3.1)
LYMPHOCYTES NFR BLD AUTO: 17.6 % (ref 19.6–45.3)
MCH RBC QN AUTO: 23.5 PG (ref 26.6–33)
MCHC RBC AUTO-ENTMCNC: 29.7 G/DL (ref 31.5–35.7)
MCV RBC AUTO: 79.2 FL (ref 79–97)
MONOCYTES # BLD AUTO: 0.44 10*3/MM3 (ref 0.1–0.9)
MONOCYTES NFR BLD AUTO: 10.3 % (ref 5–12)
NEUTROPHILS NFR BLD AUTO: 2.96 10*3/MM3 (ref 1.7–7)
NEUTROPHILS NFR BLD AUTO: 69.3 % (ref 42.7–76)
NITRITE UR QL STRIP: NEGATIVE
NRBC BLD AUTO-RTO: 0 /100 WBC (ref 0–0.2)
PH UR STRIP.AUTO: 6.5 [PH] (ref 5–8)
PLATELET # BLD AUTO: 120 10*3/MM3 (ref 140–450)
PMV BLD AUTO: 12.1 FL (ref 6–12)
POTASSIUM SERPL-SCNC: 3.9 MMOL/L (ref 3.5–5.2)
PROT SERPL-MCNC: 5.7 G/DL (ref 6–8.5)
PROT UR QL STRIP: NEGATIVE
RBC # BLD AUTO: 4.08 10*6/MM3 (ref 4.14–5.8)
SODIUM SERPL-SCNC: 139 MMOL/L (ref 136–145)
SP GR UR STRIP: >1.03 (ref 1–1.03)
UROBILINOGEN UR QL STRIP: ABNORMAL
WBC NRBC COR # BLD AUTO: 4.27 10*3/MM3 (ref 3.4–10.8)
WHOLE BLOOD HOLD COAG: NORMAL
WHOLE BLOOD HOLD SPECIMEN: NORMAL

## 2024-11-08 PROCEDURE — 85025 COMPLETE CBC W/AUTO DIFF WBC: CPT | Performed by: EMERGENCY MEDICINE

## 2024-11-08 PROCEDURE — 99283 EMERGENCY DEPT VISIT LOW MDM: CPT

## 2024-11-08 PROCEDURE — 36415 COLL VENOUS BLD VENIPUNCTURE: CPT

## 2024-11-08 PROCEDURE — 80053 COMPREHEN METABOLIC PANEL: CPT | Performed by: EMERGENCY MEDICINE

## 2024-11-08 PROCEDURE — 81003 URINALYSIS AUTO W/O SCOPE: CPT | Performed by: EMERGENCY MEDICINE

## 2024-11-08 RX ORDER — SODIUM CHLORIDE 0.9 % (FLUSH) 0.9 %
10 SYRINGE (ML) INJECTION AS NEEDED
Status: DISCONTINUED | OUTPATIENT
Start: 2024-11-08 | End: 2024-11-09 | Stop reason: HOSPADM

## 2024-11-08 NOTE — DISCHARGE INSTRUCTIONS
We have no indication today to once again readmitted to the hospital for this chronic issue.  Stay well-hydrated and follow-up your family doctor.

## 2024-11-08 NOTE — ED PROVIDER NOTES
Time: 4:07 PM EST  Date of encounter:  11/8/2024  Independent Historian/Clinical History and Information was obtained by:   Patient    History is limited by: N/A    Chief Complaint: Generalized weakness, home health issues      History of Present Illness:  Patient is a 64 y.o. year old male who presents to the emergency department for evaluation of generalized weakness.  Patient's actual only real complaint is that home health abandoned him.  He states that in the past they had been there almost 24/7 and nobody showed up last night or today to take a care of him.  He has no idea when they are coming back.  He complains of chronic left hip pain but denies any concerns for respiratory infection, fever, vomiting, etc.  He presents today purely because he cannot care for himself.      Patient Care Team  Primary Care Provider: Provider, No Known    Past Medical History:     Allergies   Allergen Reactions    Adhesive Tape Rash     Past Medical History:   Diagnosis Date    Absence of toe of right foot     Acute osteomyelitis of left calcaneus  08/18/2021    Anxiety and depression     Arthritis     Cancer     Chronic pain     STATES HIS PAIN IS 10/10 AAT    Claustrophobia     Corns and callus     Diabetic ulcer of left heel associated with type 2 DM 08/18/2021    Diabetic ulcer of left heel associated with type 2 DM 07/06/2021    Diabetic ulcer of right midfoot associated with type 2 DM 08/18/2021    Difficulty walking     Essential hypertension 08/31/2021    Hammertoe     Hyperlipidemia LDL goal <100 08/31/2021    Ingrown toenail     Obesity     Paroxysmal atrial fibrillation 08/31/2021    Polyneuropathy     Pressure ulcer, stage 1     Tinea unguium     Type 2 diabetes mellitus with polyneuropathy      Past Surgical History:   Procedure Laterality Date    CYST REMOVAL      center of back; benign    EYE SURGERY      INCISION AND DRAINAGE ABSCESS      back    INCISION AND DRAINAGE LEG Right 12/10/2021    Procedure: INCISION  AND DRAINAGE LOWER EXTREMITY;  Surgeon: Ash Leyva DPM;  Location: [facility] MAIN OR;  Service: Podiatry;  Laterality: Right;    OTHER SURGICAL HISTORY      Surgical clips left foot    TOE SURGERY Right     Removal of 5th toe    TRANS METATARSAL AMPUTATION Right 12/02/2021    Procedure: AMPUTATION TRANS METATARSAL;  Surgeon: Ash Leyva DPM;  Location: [facility] MAIN OR;  Service: Podiatry;  Laterality: Right;    VASCULAR SURGERY      WRIST SURGERY Left     repair of injury     Family History   Problem Relation Age of Onset    Hearing loss Mother     Depression Mother     Arthritis Mother     Heart disease Mother     Heart disease Father     Cancer Father         Unspecified    Cancer Sister     Hearing loss Brother     Heart disease Brother     Diabetes Paternal Grandmother     Learning disabilities Nephew     Drug abuse Nephew     COPD Nephew     Alcohol abuse Nephew        Home Medications:  Prior to Admission medications    Medication Sig Start Date End Date Taking? Authorizing Provider   acetaminophen (TYLENOL) 650 MG 8 hr tablet Take 1 tablet by mouth Every 8 (Eight) Hours As Needed for Mild Pain.   Yes Provider, MD Laura   apixaban (ELIQUIS) 5 MG tablet tablet Take 1 tablet by mouth Every 12 (Twelve) Hours. 1/31/23  Yes Delia Cervantes APRN   baclofen (LIORESAL) 10 MG tablet Take 1 tablet by mouth 3 (Three) Times a Day As Needed for Muscle Spasms. 10/24/24  Yes Wendy Zee DO   empagliflozin (JARDIANCE) 10 MG tablet tablet Take 1 tablet by mouth Daily. 10/24/24  Yes Wendy Zee DO   Insulin Lispro, 1 Unit Dial, (HumaLOG KwikPen) 100 UNIT/ML solution pen-injector Inject subcutaneously 3 times a day with meals. Use sliding scale. Max daily dose 45units Blood Glucose 150-199- 3units 200-249- 5units 250-299- 8 units 300-349- 10units 350-400- 12units >400- 14units & Call Provider 10/23/24  Yes Wendy Zee DO   metoprolol succinate XL (TOPROL-XL) 25  MG 24 hr tablet Take 0.5 tablets by mouth Daily. 10/24/24  Yes Wendy Zee DO   sertraline (ZOLOFT) 50 MG tablet Take 1 tablet by mouth Every Night. 10/23/24  Yes Wendy Zee DO   simvastatin (ZOCOR) 20 MG tablet Take 1 tablet by mouth Every Night. 23  Yes Delia Cervantes APRN   digoxin (LANOXIN) 125 MCG tablet Take 1 tablet by mouth Daily for 30 days. 6/15/23 9/10/23  Joe Haynes MD   insulin detemir (Levemir FlexPen) 100 UNIT/ML injection Inject 40 Units under the skin into the appropriate area as directed 2 (Two) Times a Day for 30 days.  Patient taking differently: Inject 60 Units under the skin into the appropriate area as directed 2 (Two) Times a Day. 6/23/23 9/10/23  Cailin Acosta MD   pregabalin (LYRICA) 25 MG capsule Take 1 capsule by mouth 3 (Three) Times a Day for 3 days. 6/15/23 9/10/23  Joe Haynes MD        Social History:   Social History     Tobacco Use    Smoking status: Former     Current packs/day: 0.00     Types: Cigarettes     Quit date: 1990     Years since quittin.2    Smokeless tobacco: Never    Tobacco comments:     quit at age 30   Vaping Use    Vaping status: Never Used   Substance Use Topics    Alcohol use: Not Currently     Comment: rare    Drug use: Not Currently     Types: Marijuana     Comment: occasionally, EVERY 3-4 MONTH         Review of Systems:  Review of Systems   Constitutional:  Negative for chills and fever.   HENT:  Negative for congestion, rhinorrhea and sore throat.    Eyes:  Negative for photophobia.   Respiratory:  Negative for apnea, cough, chest tightness and shortness of breath.    Cardiovascular:  Negative for chest pain and palpitations.   Gastrointestinal:  Negative for abdominal pain, diarrhea, nausea and vomiting.   Endocrine: Negative.    Genitourinary:  Negative for difficulty urinating and dysuria.   Musculoskeletal:  Positive for arthralgias. Negative for back pain, joint swelling and myalgias.   Skin:   "Negative for color change and wound.   Allergic/Immunologic: Negative.    Neurological:  Positive for weakness. Negative for seizures and headaches.   Psychiatric/Behavioral: Negative.     All other systems reviewed and are negative.       Physical Exam:  /65 (BP Location: Right arm, Patient Position: Lying)   Pulse 79   Temp 97.8 °F (36.6 °C) (Oral)   Resp 20   Ht 188 cm (74\")   Wt (!) 168 kg (369 lb 11.4 oz)   SpO2 100%   BMI 47.47 kg/m²     Physical Exam  Vitals and nursing note reviewed.   Constitutional:       General: He is awake.      Appearance: He is obese.   HENT:      Head: Normocephalic and atraumatic.      Nose: Nose normal.      Mouth/Throat:      Mouth: Mucous membranes are moist.   Eyes:      Extraocular Movements: Extraocular movements intact.      Pupils: Pupils are equal, round, and reactive to light.   Cardiovascular:      Rate and Rhythm: Normal rate and regular rhythm.      Heart sounds: Normal heart sounds.   Pulmonary:      Effort: Pulmonary effort is normal. No respiratory distress.      Breath sounds: Normal breath sounds. No wheezing, rhonchi or rales.   Abdominal:      General: Bowel sounds are normal.      Palpations: Abdomen is soft.      Tenderness: There is no abdominal tenderness. There is no guarding or rebound.      Comments: No rigidity   Musculoskeletal:         General: No tenderness. Normal range of motion.      Cervical back: Normal range of motion and neck supple.      Right lower leg: Edema present.      Left lower leg: Edema present.   Skin:     General: Skin is warm and dry.      Coloration: Skin is not jaundiced.   Neurological:      General: No focal deficit present.      Mental Status: He is alert. Mental status is at baseline.   Psychiatric:         Mood and Affect: Mood normal.                  Procedures:  Procedures      Medical Decision Making:      Comorbidities that affect care:    Anxiety, depression, hypertension, atrial fibrillation, type 2 " diabetes, diabetic foot wound, chronic pain    External Notes reviewed:    Hospital discharge summary:  Patient Name: Jose Shaikh  : 1960  MRN: [id number]     Date of Admission: 10/14/2024  Date of Discharge:  10/24/2024     Primary Care Physician: Delia Cervantes APRN     Consults         Date and Time Order Name Status Description     2024  1:01 PM Inpatient Podiatry Consult Completed                  Active and Resolved Hospital Problems:       Active Hospital Problems     Diagnosis POA    **COVID-19 virus infection [U07.1] Yes    COVID-19 [U07.1] Yes       Resolved Hospital Problems   No resolved problems to display.         Hospital Course      Hospital Course:  Jose Shaikh is a 64 y.o. male who was transferred to [facility] acute care after testing positive for COVID-19 at [facility]. Patient was initially hospitalized on 09/10/2023 and had a prolonged hospitalization for treatment and management of generalized weakness, deconditioning acute issues of diarrhea. Exhaustive efforts were done and attempt to find a facility that would accept patient after discharge, however, these were unsuccessful. Patient had no further acute needs or requirements for inpatient monitoring and management and he was hemodynamically stable and so patient was eventually transferred to [facility]. Patient's progress has been complicated by his noncompliance with therapy and unwillingness to work with physical therapy/Occupational Therapy. During his stay patient has been treated with IV diuresis and he had also been started on Ozempic therapy to aid in weight loss so patient could possibly pursue joint placement therapy. Patient evaluated by 3 different orthopedic surgeons including referral to [facility] and all of them advised that the patient need to continue losing weight in order to be considered for surgery and patient need to have a weight of around 280 pounds. Patient  continually ordered [employer] food while in [facility], did not work well with skilled therapy services and was consistently noncompliant with medical recommendations. Patient tested positive for COVID-19 on 10/14/2024 and per [facility] policy patient was transferred to acute care at [facility]. Patient was not hypoxic and was asymptomatic from COVID-19, no therapies for COVID-19 were initiated. Echocardiogram was obtained for evaluation of possible heart failure it showed no LV dysfunction, diastolic function difficult to ascertain per cardiology but patient may be with grade 1 diastolic dysfunction. Patient with edema in extremities but less evidence of any underlying heart failure otherwise. Edema may be contributed due to his low albumin level.  He developed frequent diarrhea episodes.  Tested PCR positive for C. difficile but antigen was negative, WBC was normal, he had no fever.  However, he did become hypotensive which resolved with IV fluids.  He was started on p.o. vancomycin for a 10 day course.  He was provided with home equipment.  He will be provided with 30 days and home care until his Medicaid home care application is approved.     Patient discharged in stable condition.    The following orders were placed and all results were independently analyzed by me:  Orders Placed This Encounter   Procedures    Urinalysis With Culture If Indicated - Urine, Clean Catch    Comprehensive Metabolic Panel    [place] Draw    CBC Auto Differential    Insert peripheral IV    CBC & Differential    Green Top (Gel)    Lavender Top    Gold Top - SST    Light Blue Top       Medications Given in the Emergency Department:  Medications   sodium chloride 0.9 % flush 10 mL (has no administration in time range)        ED Course:         Labs:    Lab Results (last 24 hours)       Procedure Component Value Units Date/Time    Urinalysis With Culture If Indicated - Urine, Clean Catch  [[id number]]  (Abnormal) Collected: 11/08/24 1622    Specimen: Urine, Clean Catch Updated: 11/08/24 1631     Color, UA Yellow     Appearance, UA Clear     pH, UA 6.5     Specific Gravity, UA >1.030     Glucose, UA >=1000 mg/dL (3+)     Ketones, UA 15 mg/dL (1+)     Bilirubin, UA Negative     Blood, UA Negative     Protein, UA Negative     Leuk Esterase, UA Negative     Nitrite, UA Negative     Urobilinogen, UA 0.2 E.U./dL    Narrative:      In absence of clinical symptoms, the presence of pyuria, bacteria, and/or nitrites on the urinalysis result does not correlate with infection.  Urine microscopic not indicated.    CBC & Differential [[id number]]  (Abnormal) Collected: 11/08/24 1622    Specimen: Blood Updated: 11/08/24 1630    Narrative:      The following orders were created for panel order CBC & Differential.  Procedure                               Abnormality         Status                     ---------                               -----------         ------                     CBC Auto Differential[[id number]]        Abnormal            Final result                 Please view results for these tests on the individual orders.    Comprehensive Metabolic Panel [[id number]]  (Abnormal) Collected: 11/08/24 1622    Specimen: Blood Updated: 11/08/24 1658     Glucose 114 mg/dL      BUN 8 mg/dL      Creatinine 0.46 mg/dL      Sodium 139 mmol/L      Potassium 3.9 mmol/L      Chloride 106 mmol/L      CO2 25.4 mmol/L      Calcium 8.2 mg/dL      Total Protein 5.7 g/dL      Albumin 2.9 g/dL      ALT (SGPT) 21 U/L      AST (SGOT) 29 U/L      Alkaline Phosphatase 146 U/L      Total Bilirubin 1.0 mg/dL      Globulin 2.8 gm/dL      A/G Ratio 1.0 g/dL      BUN/Creatinine Ratio 17.4     Anion Gap 7.6 mmol/L      eGFR 115.4 mL/min/1.73     Narrative:      GFR Normal >60  Chronic Kidney Disease <60  Kidney Failure <15      CBC Auto Differential [[id number]]  (Abnormal) Collected: 11/08/24 1622    Specimen: Blood Updated: 11/08/24  1630     WBC 4.27 10*3/mm3      RBC 4.08 10*6/mm3      Hemoglobin 9.6 g/dL      Hematocrit 32.3 %      MCV 79.2 fL      MCH 23.5 pg      MCHC 29.7 g/dL      RDW 16.4 %      RDW-SD 47.0 fl      MPV 12.1 fL      Platelets 120 10*3/mm3      Neutrophil % 69.3 %      Lymphocyte % 17.6 %      Monocyte % 10.3 %      Eosinophil % 1.9 %      Basophil % 0.7 %      Immature Grans % 0.2 %      Neutrophils, Absolute 2.96 10*3/mm3      Lymphocytes, Absolute 0.75 10*3/mm3      Monocytes, Absolute 0.44 10*3/mm3      Eosinophils, Absolute 0.08 10*3/mm3      Basophils, Absolute 0.03 10*3/mm3      Immature Grans, Absolute 0.01 10*3/mm3      nRBC 0.0 /100 WBC              Imaging:    No Radiology Exams Resulted Within Past 24 Hours      Differential Diagnosis and Discussion:      Weakness: Based on the patient's history, signs, and symptoms, the diffential diagnosis includes but is not limited to meningitis, stroke, sepsis, subarachnoid hemorrhage, intracranial bleeding, encephalitis, acute uti, dehydration, MS, myasthenia gravis, Guillan Pierce, migraine variant, neuromuscular disorders vertigo, electrolyte imbalance, and metabolic disorders.      All labs were reviewed and interpreted by me.    [facility]                     Patient Care Considerations:    SEPSIS was considered but is NOT present in the emergency department as SIRS criteria is not present.      Consultants/Shared Management Plan:    None    Social Determinants of Health:    Patient is independent, reliable, and has access to care.       Disposition and Care Coordination:    Discharged: The patient is suitable and stable for discharge with no need for consideration of admission.    I have explained the patient´s condition, diagnoses and treatment plan based on the information available to me at this time. I have answered questions and addressed any concerns. The patient has a good  understanding of the patient´s diagnosis, condition, and treatment plan as can be expected at  this point. The vital signs have been stable. The patient´s condition is stable and appropriate for discharge from the emergency department.      The patient will pursue further outpatient evaluation with the primary care physician or other designated or consulting physician as outlined in the discharge instructions. They are agreeable to this plan of care and follow-up instructions have been explained in detail. The patient has received these instructions in written format and has expressed an understanding of the discharge instructions. The patient is aware that any significant change in condition or worsening of symptoms should prompt an immediate return to this or the closest emergency department or call to 911.    Final diagnoses:   Weakness        ED Disposition       ED Disposition   Discharge    Condition   Stable    Comment   --               This medical record created using voice recognition software.             Johny Whitmore MD  11/08/24 7204

## 2024-11-18 NOTE — SIGNIFICANT NOTE
Assessment & Plan     Gastroesophageal reflux disease with esophagitis without hemorrhage  2 weeks of PPI did help improve symptoms.  She does still have some feeling of reflux without pain.  Discussed self care  Will check for food intolerances   Ok to use pepcid as needed  Consider hida scan if symptoms return  - ALLERGEN EGG WHITE IGE  - ALLERGEN MILK IGE  - ALLERGEN WHEAT IGE  - ALLERGEN CORN IGE  - ALLERGEN PEANUT IGE  - ALLERGEN SOYBEAN IGE  - ALLERGEN TOMATO IGE  - ALLERGEN ORANGE IGE  - ALLERGEN OAT IGE  - ALLERGEN APPLE IGE  - Tissue transglutaminase leilani IgA and IgG; Future  - Tissue transglutaminase leilani IgA and IgG    Encounter for initial prescription of vaginal ring hormonal contraceptive  Refilled   - etonogestrel-ethinyl estradiol (HALOETTE) 0.12-0.015 MG/24HR vaginal ring; Insert one (1) ring vaginally and leave in place for 3 consecutive weeks (21 days), then remove for 1 week.      The longitudinal plan of care for the diagnosis(es)/condition(s) as documented were addressed during this visit. Due to the added complexity in care, I will continue to support Trinh in the subsequent management and with ongoing continuity of care.    Ordering of each unique test        See Patient Instructions    No follow-ups on file.    Subjective   Trinh is a 23 year old, presenting for the following health issues:  Gastrophageal Reflux    HPI     GERD/Heartburn follow up   Onset/Duration: Beginning of September.  Description: Still feels like having liquid in throat, doesn't burn or hurt like before.  Intensity: mild, 2/10  Progression of Symptoms: improving  Accompanying Signs & Symptoms:  Does it feel like food gets stuck or trouble swallowing: YES- only with starchy foods  Nausea: No  Vomiting (bloody?): No  Abdominal Pain: No  Black-Tarry stools: No  Bloody stools: No  History:  Previous similar episodes: No  Previous ulcers: No  Precipitating factors:   Caffeine use: No  Alcohol use: No  NSAID/Aspirin use:  Epithelial %: 0%    Exposed Bone: no    Exposed Tendon: no    Impression: unchanged    Short Term Goals: INCREASE GRANULATION AND DECREASE SIZE         "No  Tobacco use: No  Worse with Acidic/ tomatoes.  Alleviating factors: Medication  Therapies tried and outcome:             Lifestyle changes: None            Medications: Omeprazole (Prilosec), for 2 weeks and stopped taking on the 14th.        Review of Systems  CONSTITUTIONAL: NEGATIVE for fever, chills, change in weight  ENT/MOUTH: post nasal drip   RESP:cough from post nasal drip   CV: NEGATIVE for chest pain, palpitations or peripheral edema  GI: heartburn or reflux  : denies dysuria       Objective    /74   Pulse 94   Temp 98.4  F (36.9  C) (Tympanic)   Resp 16   Ht 1.499 m (4' 11\")   Wt 55.7 kg (122 lb 12.8 oz)   LMP 09/29/2024   SpO2 99%   BMI 24.80 kg/m    Body mass index is 24.8 kg/m .  Physical Exam   GENERAL: alert and no distress  RESP: lungs clear to auscultation - no rales, rhonchi or wheezes  CV: regular rate and rhythm, normal S1 S2, no S3 or S4, no murmur, click or rub, no peripheral edema  ABDOMEN: soft, nontender, no hepatosplenomegaly, no masses and bowel sounds normal  PSYCH: mentation appears normal, affect normal/bright    Office Visit on 08/13/2024   Component Date Value Ref Range Status    Sodium 08/13/2024 141  135 - 145 mmol/L Final    Potassium 08/13/2024 3.9  3.4 - 5.3 mmol/L Final    Carbon Dioxide (CO2) 08/13/2024 24  22 - 29 mmol/L Final    Anion Gap 08/13/2024 12  7 - 15 mmol/L Final    Urea Nitrogen 08/13/2024 11.8  6.0 - 20.0 mg/dL Final    Creatinine 08/13/2024 0.64  0.51 - 0.95 mg/dL Final    GFR Estimate 08/13/2024 >90  >60 mL/min/1.73m2 Final    eGFR calculated using 2021 CKD-EPI equation.    Calcium 08/13/2024 9.8  8.8 - 10.4 mg/dL Final    Reference intervals for this test were updated on 7/16/2024 to reflect our healthy population more accurately. There may be differences in the flagging of prior results with similar values performed with this method. Those prior results can be interpreted in the context of the updated reference intervals.    Chloride " 08/13/2024 105  98 - 107 mmol/L Final    Glucose 08/13/2024 90  70 - 99 mg/dL Final    Alkaline Phosphatase 08/13/2024 95  40 - 150 U/L Final    AST 08/13/2024 20  0 - 45 U/L Final    ALT 08/13/2024 13  0 - 50 U/L Final    Protein Total 08/13/2024 7.6  6.4 - 8.3 g/dL Final    Albumin 08/13/2024 4.6  3.5 - 5.2 g/dL Final    Bilirubin Total 08/13/2024 0.5  <=1.2 mg/dL Final    Iron 08/13/2024 30 (L)  37 - 145 ug/dL Final    Iron Binding Capacity 08/13/2024 430  240 - 430 ug/dL Final    Iron Sat Index 08/13/2024 7 (L)  15 - 46 % Final    Ferritin 08/13/2024 8  6 - 175 ng/mL Final    TSH 08/13/2024 1.69  0.30 - 4.20 uIU/mL Final    Vitamin B12 08/13/2024 766  232 - 1,245 pg/mL Final    WBC Count 08/13/2024 6.2  4.0 - 11.0 10e3/uL Final    RBC Count 08/13/2024 4.47  3.80 - 5.20 10e6/uL Final    Hemoglobin 08/13/2024 12.6  11.7 - 15.7 g/dL Final    Hematocrit 08/13/2024 39.7  35.0 - 47.0 % Final    MCV 08/13/2024 89  78 - 100 fL Final    MCH 08/13/2024 28.2  26.5 - 33.0 pg Final    MCHC 08/13/2024 31.7  31.5 - 36.5 g/dL Final    RDW 08/13/2024 12.9  10.0 - 15.0 % Final    Platelet Count 08/13/2024 339  150 - 450 10e3/uL Final    % Neutrophils 08/13/2024 58  % Final    % Lymphocytes 08/13/2024 33  % Final    % Monocytes 08/13/2024 6  % Final    % Eosinophils 08/13/2024 3  % Final    % Basophils 08/13/2024 1  % Final    % Immature Granulocytes 08/13/2024 0  % Final    NRBCs per 100 WBC 08/13/2024 0  <1 /100 Final    Absolute Neutrophils 08/13/2024 3.6  1.6 - 8.3 10e3/uL Final    Absolute Lymphocytes 08/13/2024 2.0  0.8 - 5.3 10e3/uL Final    Absolute Monocytes 08/13/2024 0.4  0.0 - 1.3 10e3/uL Final    Absolute Eosinophils 08/13/2024 0.2  0.0 - 0.7 10e3/uL Final    Absolute Basophils 08/13/2024 0.0  0.0 - 0.2 10e3/uL Final    Absolute Immature Granulocytes 08/13/2024 0.0  <=0.4 10e3/uL Final    Absolute NRBCs 08/13/2024 0.0  10e3/uL Final           Signed Electronically by: RONEL Garcia CNP

## 2024-11-19 ENCOUNTER — APPOINTMENT (OUTPATIENT)
Dept: GENERAL RADIOLOGY | Facility: HOSPITAL | Age: 64
End: 2024-11-19
Payer: MEDICARE

## 2024-11-19 ENCOUNTER — HOSPITAL ENCOUNTER (EMERGENCY)
Facility: HOSPITAL | Age: 64
Discharge: HOME | End: 2024-11-19
Attending: EMERGENCY MEDICINE | Admitting: EMERGENCY MEDICINE
Payer: MEDICARE

## 2024-11-19 VITALS
RESPIRATION RATE: 20 BRPM | SYSTOLIC BLOOD PRESSURE: 131 MMHG | TEMPERATURE: 97.9 F | HEIGHT: 74 IN | OXYGEN SATURATION: 100 % | HEART RATE: 87 BPM | BODY MASS INDEX: 40.43 KG/M2 | WEIGHT: 315 LBS | DIASTOLIC BLOOD PRESSURE: 70 MMHG

## 2024-11-19 DIAGNOSIS — R53.1 WEAKNESS: Primary | ICD-10-CM

## 2024-11-19 LAB
ALBUMIN SERPL-MCNC: 2.6 G/DL (ref 3.5–5.2)
ALBUMIN/GLOB SERPL: 1 G/DL
ALP SERPL-CCNC: 148 U/L (ref 39–117)
ALT SERPL W P-5'-P-CCNC: 25 U/L (ref 1–41)
ANION GAP SERPL CALCULATED.3IONS-SCNC: 7.2 MMOL/L (ref 5–15)
AST SERPL-CCNC: 33 U/L (ref 1–40)
BASOPHILS # BLD AUTO: 0.03 10*3/MM3 (ref 0–0.2)
BASOPHILS NFR BLD AUTO: 0.8 % (ref 0–1.5)
BILIRUB SERPL-MCNC: 0.8 MG/DL (ref 0–1.2)
BUN SERPL-MCNC: 9 MG/DL (ref 8–23)
BUN/CREAT SERPL: 25 (ref 7–25)
CALCIUM SPEC-SCNC: 7.9 MG/DL (ref 8.6–10.5)
CHLORIDE SERPL-SCNC: 110 MMOL/L (ref 98–107)
CO2 SERPL-SCNC: 22.8 MMOL/L (ref 22–29)
CREAT SERPL-MCNC: 0.36 MG/DL (ref 0.76–1.27)
D-LACTATE SERPL-SCNC: 1.4 MMOL/L (ref 0.5–2)
DEPRECATED RDW RBC AUTO: 45.8 FL (ref 37–54)
EGFRCR SERPLBLD CKD-EPI 2021: 124.2 ML/MIN/1.73
EOSINOPHIL # BLD AUTO: 0.12 10*3/MM3 (ref 0–0.4)
EOSINOPHIL NFR BLD AUTO: 3.1 % (ref 0.3–6.2)
ERYTHROCYTE [DISTWIDTH] IN BLOOD BY AUTOMATED COUNT: 16.9 % (ref 12.3–15.4)
GLOBULIN UR ELPH-MCNC: 2.5 GM/DL
GLUCOSE SERPL-MCNC: 166 MG/DL (ref 65–99)
HCT VFR BLD AUTO: 29.6 % (ref 37.5–51)
HGB BLD-MCNC: 8.8 G/DL (ref 13–17.7)
HOLD SPECIMEN: NORMAL
IMM GRANULOCYTES # BLD AUTO: 0.01 10*3/MM3 (ref 0–0.05)
IMM GRANULOCYTES NFR BLD AUTO: 0.3 % (ref 0–0.5)
LYMPHOCYTES # BLD AUTO: 0.78 10*3/MM3 (ref 0.7–3.1)
LYMPHOCYTES NFR BLD AUTO: 20.3 % (ref 19.6–45.3)
MCH RBC QN AUTO: 22.3 PG (ref 26.6–33)
MCHC RBC AUTO-ENTMCNC: 29.7 G/DL (ref 31.5–35.7)
MCV RBC AUTO: 75.1 FL (ref 79–97)
MONOCYTES # BLD AUTO: 0.45 10*3/MM3 (ref 0.1–0.9)
MONOCYTES NFR BLD AUTO: 11.7 % (ref 5–12)
NEUTROPHILS NFR BLD AUTO: 2.46 10*3/MM3 (ref 1.7–7)
NEUTROPHILS NFR BLD AUTO: 63.8 % (ref 42.7–76)
NRBC BLD AUTO-RTO: 0 /100 WBC (ref 0–0.2)
PLATELET # BLD AUTO: 93 10*3/MM3 (ref 140–450)
PMV BLD AUTO: ABNORMAL FL
POTASSIUM SERPL-SCNC: 4 MMOL/L (ref 3.5–5.2)
PROT SERPL-MCNC: 5.1 G/DL (ref 6–8.5)
RBC # BLD AUTO: 3.94 10*6/MM3 (ref 4.14–5.8)
SODIUM SERPL-SCNC: 140 MMOL/L (ref 136–145)
TROPONIN T SERPL HS-MCNC: 12 NG/L
WBC NRBC COR # BLD AUTO: 3.85 10*3/MM3 (ref 3.4–10.8)
WHOLE BLOOD HOLD COAG: NORMAL

## 2024-11-19 PROCEDURE — 80053 COMPREHEN METABOLIC PANEL: CPT | Performed by: EMERGENCY MEDICINE

## 2024-11-19 PROCEDURE — 96374 THER/PROPH/DIAG INJ IV PUSH: CPT

## 2024-11-19 PROCEDURE — 25810000003 SODIUM CHLORIDE 0.9 % SOLUTION: Performed by: EMERGENCY MEDICINE

## 2024-11-19 PROCEDURE — 87040 BLOOD CULTURE FOR BACTERIA: CPT | Performed by: EMERGENCY MEDICINE

## 2024-11-19 PROCEDURE — 85025 COMPLETE CBC W/AUTO DIFF WBC: CPT | Performed by: EMERGENCY MEDICINE

## 2024-11-19 PROCEDURE — 36415 COLL VENOUS BLD VENIPUNCTURE: CPT

## 2024-11-19 PROCEDURE — 25010000002 ACETAMINOPHEN 10 MG/ML SOLUTION: Performed by: EMERGENCY MEDICINE

## 2024-11-19 PROCEDURE — 71045 X-RAY EXAM CHEST 1 VIEW: CPT

## 2024-11-19 PROCEDURE — 83605 ASSAY OF LACTIC ACID: CPT | Performed by: EMERGENCY MEDICINE

## 2024-11-19 PROCEDURE — 84484 ASSAY OF TROPONIN QUANT: CPT | Performed by: EMERGENCY MEDICINE

## 2024-11-19 PROCEDURE — 99283 EMERGENCY DEPT VISIT LOW MDM: CPT

## 2024-11-19 RX ORDER — ACETAMINOPHEN 10 MG/ML
1000 INJECTION, SOLUTION INTRAVENOUS ONCE
Status: COMPLETED | OUTPATIENT
Start: 2024-11-19 | End: 2024-11-19

## 2024-11-19 RX ADMIN — SODIUM CHLORIDE 1000 ML: 9 INJECTION, SOLUTION INTRAVENOUS at 12:37

## 2024-11-19 RX ADMIN — ACETAMINOPHEN 1000 MG: 10 INJECTION INTRAVENOUS at 18:29

## 2024-11-19 NOTE — SIGNIFICANT NOTE
"   11/19/24 1437   Plan   Final Note SW met with pt at bedside to discuss discharge planning. Pt reports that he has caregiver services at this time, however, he did not have them today because of a school dinner for the caregiver but will be back on 11/20/24. He reports that on Friday the caregiver services will be terminated and he is unsure what will happen after that. Pt reports that he also has home health that come to the home as well. SW discussed long term options and rehab this date. Pt states that he has tried long term and rehab referrals in the past but was denied due to being a convicted felon. Pt states, \"you all can do whatever you need to do\" when discussing if pt would be open to sending new referrals this date. Pt states he thought a facility in [place] was going to accept him, however, they ended up denying him. Pt requests [facility] this date if that would be an option for him. Pt reports that he does not want to lose his apartment due to the low payment and always having somewhere to return if long term is not somewhere he wants to be. Pt states he is also pending with caregiver services through [facility] but is unsure where he is on the waitlist. Pt reports that he is not able to get up to use the bathroom or get himself food at home. SW offered to send turkey sandwiches home with pt and pt declined. SW updated provider.       "

## 2024-11-19 NOTE — ED PROVIDER NOTES
Time: 2:57 PM EST  Date of encounter:  11/19/2024  Independent Historian/Clinical History and Information was obtained by:   Patient    History is limited by: N/A    Chief Complaint: Weakness      History of Present Illness:  Patient is a 64 y.o. year old male who presents to the emergency department for evaluation of weakness that has been chronic, however the patient is bedbound and gets home health.  He states that home health did not come out today but will be seen tomorrow.  Patient denies chest pain and shortness of breath.  Patient denies cough and hemoptysis.  Patient denies vomiting but has had some mild diarrhea.      Patient Care Team  Primary Care Provider: Provider, No Known    Past Medical History:     Allergies   Allergen Reactions    Adhesive Tape Rash     Past Medical History:   Diagnosis Date    Absence of toe of right foot     Acute osteomyelitis of left calcaneus  08/18/2021    Anxiety and depression     Arthritis     Cancer     Chronic pain     STATES HIS PAIN IS 10/10 AAT    Claustrophobia     Corns and callus     Diabetic ulcer of left heel associated with type 2 DM 08/18/2021    Diabetic ulcer of left heel associated with type 2 DM 07/06/2021    Diabetic ulcer of right midfoot associated with type 2 DM 08/18/2021    Difficulty walking     Essential hypertension 08/31/2021    Hammertoe     Hyperlipidemia LDL goal <100 08/31/2021    Ingrown toenail     Obesity     Paroxysmal atrial fibrillation 08/31/2021    Polyneuropathy     Pressure ulcer, stage 1     Tinea unguium     Type 2 diabetes mellitus with polyneuropathy      Past Surgical History:   Procedure Laterality Date    CYST REMOVAL      center of back; benign    EYE SURGERY      INCISION AND DRAINAGE ABSCESS      back    INCISION AND DRAINAGE LEG Right 12/10/2021    Procedure: INCISION AND DRAINAGE LOWER EXTREMITY;  Surgeon: Ash Leyva DPM;  Location: [facility] OR;  Service: Podiatry;  Laterality: Right;    OTHER SURGICAL  HISTORY      Surgical clips left foot    TOE SURGERY Right     Removal of 5th toe    TRANS METATARSAL AMPUTATION Right 12/02/2021    Procedure: AMPUTATION TRANS METATARSAL;  Surgeon: Ash Leyva DPM;  Location: [facility] MAIN OR;  Service: Podiatry;  Laterality: Right;    VASCULAR SURGERY      WRIST SURGERY Left     repair of injury     Family History   Problem Relation Age of Onset    Hearing loss Mother     Depression Mother     Arthritis Mother     Heart disease Mother     Heart disease Father     Cancer Father         Unspecified    Cancer Sister     Hearing loss Brother     Heart disease Brother     Diabetes Paternal Grandmother     Learning disabilities Nephew     Drug abuse Nephew     COPD Nephew     Alcohol abuse Nephew        Home Medications:  Prior to Admission medications    Medication Sig Start Date End Date Taking? Authorizing Provider   acetaminophen (TYLENOL) 650 MG 8 hr tablet Take 1 tablet by mouth Every 8 (Eight) Hours As Needed for Mild Pain.    Provider, MD Laura   apixaban (ELIQUIS) 5 MG tablet tablet Take 1 tablet by mouth Every 12 (Twelve) Hours. 1/31/23   Delia Cervantes APRN   baclofen (LIORESAL) 10 MG tablet Take 1 tablet by mouth 3 (Three) Times a Day As Needed for Muscle Spasms. 10/24/24   Wendy Zee DO   digoxin (LANOXIN) 125 MCG tablet Take 1 tablet by mouth Daily for 30 days. 6/15/23 9/10/23  Joe Haynes MD   empagliflozin (JARDIANCE) 10 MG tablet tablet Take 1 tablet by mouth Daily. 10/24/24   Wendy Zee DO   insulin detemir (Levemir FlexPen) 100 UNIT/ML injection Inject 40 Units under the skin into the appropriate area as directed 2 (Two) Times a Day for 30 days.  Patient taking differently: Inject 60 Units under the skin into the appropriate area as directed 2 (Two) Times a Day. 6/23/23 9/10/23  Cailin Acosta MD   Insulin Lispro, 1 Unit Dial, (HumaLOG KwikPen) 100 UNIT/ML solution pen-injector Inject subcutaneously 3 times a day  with meals. Use sliding scale. Max daily dose 45units Blood Glucose 150-199- 3units 200-249- 5units 250-299- 8 units 300-349- 10units 350-400- 12units >400- 14units & Call Provider 10/23/24   Wendy Zee DO   metoprolol succinate XL (TOPROL-XL) 25 MG 24 hr tablet Take 0.5 tablets by mouth Daily. 10/24/24   Wendy Zee DO   pregabalin (LYRICA) 25 MG capsule Take 1 capsule by mouth 3 (Three) Times a Day for 3 days. 6/15/23 9/10/23  Joe Haynes MD   sertraline (ZOLOFT) 50 MG tablet Take 1 tablet by mouth Every Night. 10/23/24   Wendy Zee DO   simvastatin (ZOCOR) 20 MG tablet Take 1 tablet by mouth Every Night. 23   Delia Cervantes APRN        Social History:   Social History     Tobacco Use    Smoking status: Former     Current packs/day: 0.00     Types: Cigarettes     Quit date: 1990     Years since quittin.2    Smokeless tobacco: Never    Tobacco comments:     quit at age 30   Vaping Use    Vaping status: Never Used   Substance Use Topics    Alcohol use: Not Currently     Comment: rare    Drug use: Not Currently     Types: Marijuana     Comment: occasionally, EVERY 3-4 MONTH         Review of Systems:  Review of Systems   Constitutional:  Negative for chills and fever.   HENT:  Negative for congestion, rhinorrhea and sore throat.    Eyes:  Negative for pain and visual disturbance.   Respiratory:  Negative for apnea, cough, chest tightness and shortness of breath.    Cardiovascular:  Negative for chest pain and palpitations.   Gastrointestinal:  Negative for abdominal pain, diarrhea, nausea and vomiting.   Genitourinary:  Negative for difficulty urinating and dysuria.   Musculoskeletal:  Negative for joint swelling and myalgias.   Skin:  Negative for color change.   Neurological:  Positive for weakness. Negative for seizures and headaches.   Psychiatric/Behavioral: Negative.     All other systems reviewed and are negative.       Physical Exam:  /57   " Pulse 99   Temp 97.9 °F (36.6 °C)   Resp 20   Ht 188 cm (74\")   SpO2 100%   BMI 47.47 kg/m²     Physical Exam  Vitals and nursing note reviewed.   Constitutional:       General: He is not in acute distress.     Appearance: Normal appearance. He is not toxic-appearing.   HENT:      Head: Normocephalic and atraumatic.      Jaw: There is normal jaw occlusion.   Eyes:      General: Lids are normal.      Extraocular Movements: Extraocular movements intact.      Conjunctiva/sclera: Conjunctivae normal.      Pupils: Pupils are equal, round, and reactive to light.   Cardiovascular:      Rate and Rhythm: Normal rate and regular rhythm.      Pulses: Normal pulses.      Heart sounds: Normal heart sounds.   Pulmonary:      Effort: Pulmonary effort is normal. No respiratory distress.      Breath sounds: Normal breath sounds. No wheezing or rhonchi.   Abdominal:      General: Abdomen is flat.      Palpations: Abdomen is soft.      Tenderness: There is no abdominal tenderness. There is no guarding or rebound.   Musculoskeletal:         General: Normal range of motion.      Cervical back: Normal range of motion and neck supple.      Right lower leg: No edema.      Left lower leg: No edema.   Skin:     General: Skin is warm and dry.   Neurological:      Mental Status: He is alert and oriented to person, place, and time. Mental status is at baseline.   Psychiatric:         Mood and Affect: Mood normal.                  Procedures:  Procedures      Medical Decision Making:      Comorbidities that affect care:    Diabetes    External Notes reviewed:    Hospital Discharge Summary: Patient had a prolonged stay for weakness and deconditioning      The following orders were placed and all results were independently analyzed by me:  Orders Placed This Encounter   Procedures    Blood Culture - Blood,    Blood Culture - Blood,    XR Chest 1 View    Comprehensive Metabolic Panel    Single High Sensitivity Troponin T    Lactic Acid, " Plasma    CBC Auto Differential    Inpatient Hospitalist Consult    CBC & Differential    Extra Tubes    Gold Top - SST    Light Blue Top       Medications Given in the Emergency Department:  Medications   sodium chloride 0.9 % bolus 1,000 mL (1,000 mL Intravenous New Bag 11/19/24 1237)        ED Course:         Labs:    Lab Results (last 24 hours)       Procedure Component Value Units Date/Time    CBC & Differential [[id number]]  (Abnormal) Collected: 11/19/24 1225    Specimen: Blood Updated: 11/19/24 1240    Narrative:      The following orders were created for panel order CBC & Differential.  Procedure                               Abnormality         Status                     ---------                               -----------         ------                     CBC Auto Differential[[id number]]        Abnormal            Final result               Scan Slide[[id number]]                                                                    Please view results for these tests on the individual orders.    Comprehensive Metabolic Panel [[id number]]  (Abnormal) Collected: 11/19/24 1225    Specimen: Blood Updated: 11/19/24 1255     Glucose 166 mg/dL      BUN 9 mg/dL      Creatinine 0.36 mg/dL      Sodium 140 mmol/L      Potassium 4.0 mmol/L      Chloride 110 mmol/L      CO2 22.8 mmol/L      Calcium 7.9 mg/dL      Total Protein 5.1 g/dL      Albumin 2.6 g/dL      ALT (SGPT) 25 U/L      AST (SGOT) 33 U/L      Alkaline Phosphatase 148 U/L      Total Bilirubin 0.8 mg/dL      Globulin 2.5 gm/dL      A/G Ratio 1.0 g/dL      BUN/Creatinine Ratio 25.0     Anion Gap 7.2 mmol/L      eGFR 124.2 mL/min/1.73     Narrative:      GFR Normal >60  Chronic Kidney Disease <60  Kidney Failure <15      Single High Sensitivity Troponin T [[id number]]  (Normal) Collected: 11/19/24 1225    Specimen: Blood Updated: 11/19/24 1254     HS Troponin T 12 ng/L     Narrative:      High Sensitive Troponin T Reference Range:  <14.0 ng/L- Negative Female for  AMI  <22.0 ng/L- Negative Male for AMI  >=14 - Abnormal Female indicating possible myocardial injury.  >=22 - Abnormal Male indicating possible myocardial injury.   Clinicians would have to utilize clinical acumen, EKG, Troponin, and serial changes to determine if it is an Acute Myocardial Infarction or myocardial injury due to an underlying chronic condition.         Lactic Acid, Plasma [[id number]]  (Normal) Collected: 11/19/24 1225    Specimen: Blood Updated: 11/19/24 1251     Lactate 1.4 mmol/L     CBC Auto Differential [[id number]]  (Abnormal) Collected: 11/19/24 1225    Specimen: Blood Updated: 11/19/24 1240     WBC 3.85 10*3/mm3      RBC 3.94 10*6/mm3      Hemoglobin 8.8 g/dL      Hematocrit 29.6 %      MCV 75.1 fL      MCH 22.3 pg      MCHC 29.7 g/dL      RDW 16.9 %      RDW-SD 45.8 fl      MPV --     Comment: Cannot calculate due to specimen interference.         Platelets 93 10*3/mm3      Neutrophil % 63.8 %      Lymphocyte % 20.3 %      Monocyte % 11.7 %      Eosinophil % 3.1 %      Basophil % 0.8 %      Immature Grans % 0.3 %      Neutrophils, Absolute 2.46 10*3/mm3      Lymphocytes, Absolute 0.78 10*3/mm3      Monocytes, Absolute 0.45 10*3/mm3      Eosinophils, Absolute 0.12 10*3/mm3      Basophils, Absolute 0.03 10*3/mm3      Immature Grans, Absolute 0.01 10*3/mm3      nRBC 0.0 /100 WBC     Blood Culture - Blood, Arm, Right [[id number]] Collected: 11/19/24 1234    Specimen: Blood from Arm, Right Updated: 11/19/24 1240    Blood Culture - Blood, Arm, Left [[id number]] Collected: 11/19/24 1234    Specimen: Blood from Arm, Left Updated: 11/19/24 1238             Imaging:    XR Chest 1 View    Result Date: 11/19/2024  XR CHEST 1 VW Date of Exam: 11/19/2024 12:55 PM EST Indication: Cough, persistent Cough cough Comparison: Chest radiograph 10/26/2024. Findings: Cardiomediastinal silhouette is unchanged. No focal consolidation. No pleural effusion or pneumothorax. Osseous structures are unchanged.     Impression:  No evidence of acute cardiopulmonary disease. Electronically Signed: Isaac Calvert MD  11/19/2024 1:15 PM EST  Workstation ID: [id number]       Differential Diagnosis and Discussion:    Weakness: Based on the patient's history, signs, and symptoms, the diffential diagnosis includes but is not limited to meningitis, stroke, sepsis, subarachnoid hemorrhage, intracranial bleeding, encephalitis, acute uti, dehydration, MS, myasthenia gravis, Guillan Arma, migraine variant, neuromuscular disorders vertigo, electrolyte imbalance, and metabolic disorders.    All labs were reviewed and interpreted by me.  All X-rays impressions were independently interpreted by me.    MDM     The patient´s CBC that was reviewed and interpreted by me shows no abnormalities of critical concern. Of note, there is no anemia requiring a blood transfusion and the platelet count is acceptable.  The patient´s CMP that was reviewed and interpretted by me shows no abnormalities of critical concern. Of note, the patient´s sodium and potassium are acceptable. The patient´s liver enzymes are unremarkable. The patient´s renal function (creatinine) is preserved. The patient has a normal anion gap.  The patient is resting comfortably and feels better, is alert, talkative and in no distress. The repeat examination is unremarkable and benign.The history, exam, diagnostic testing in the patient's current condition do not suggest meningitis, stroke, sepsis, subarachnoid hemorrhage, intracranial bleeding, encephalitis or other significant pathology that would warrant further testing, continued ED treatment, admission, neurological consultation, or other specialist evaluation at this point. The vital signs have been stable. The patient's condition is stable and appropriate for discharge. The patient will pursue further outpatient evaluation with the primary care physician or other designated or consulting position as indicated in the discharge  instructions.                Patient Care Considerations:    ANTIBIOTICS: I considered prescribing antibiotics as an outpatient however no bacterial focus of infection was found.      Consultants/Shared Management Plan:    Case was discussed with Dr. Maya and the patient has had a prolonged stay and has been set up for outpatient rehab by the hospital.    Social Determinants of Health:     was consulted and the patient will have home health and be evaluated by a home health nurse.      Disposition and Care Coordination:    Discharged: I considered escalation of care by admitting this patient to the hospital, however patient has no acute medical issues warranting admission.    I have explained the patient´s condition, diagnoses and treatment plan based on the information available to me at this time. I have answered questions and addressed any concerns. The patient has a good  understanding of the patient´s diagnosis, condition, and treatment plan as can be expected at this point. The vital signs have been stable. The patient´s condition is stable and appropriate for discharge from the emergency department.      The patient will pursue further outpatient evaluation with the primary care physician or other designated or consulting physician as outlined in the discharge instructions. They are agreeable to this plan of care and follow-up instructions have been explained in detail. The patient has received these instructions in written format and has expressed an understanding of the discharge instructions. The patient is aware that any significant change in condition or worsening of symptoms should prompt an immediate return to this or the closest emergency department or call to 911.  I have explained discharge medications and the need for follow up with the patient/caretakers. This was also printed in the discharge instructions. Patient was discharged with the following medications and follow up:       Medication List        Changed      Levemir FlexPen 100 UNIT/ML injection  Generic drug: insulin detemir  Inject 40 Units under the skin into the appropriate area as directed 2 (Two) Times a Day for 30 days.  What changed: how much to take           Provider, No Known  [facility]  [place] [address]             Final diagnoses:   Weakness        ED Disposition       ED Disposition   Discharge    Condition   Stable    Comment   --               This medical record created using voice recognition software.             Brown Vasquez MD  11/19/24 1500     Specialty Care (Immediate)...

## 2024-11-24 LAB
BACTERIA SPEC AEROBE CULT: NORMAL
BACTERIA SPEC AEROBE CULT: NORMAL

## 2024-11-25 ENCOUNTER — HOSPITAL ENCOUNTER (EMERGENCY)
Facility: HOSPITAL | Age: 64
Discharge: HOME | End: 2024-11-26
Attending: EMERGENCY MEDICINE | Admitting: EMERGENCY MEDICINE
Payer: MEDICARE

## 2024-11-25 DIAGNOSIS — R62.7 FAILURE TO THRIVE IN ADULT: Primary | ICD-10-CM

## 2024-11-25 LAB
ALBUMIN SERPL-MCNC: 3 G/DL (ref 3.5–5.2)
ALBUMIN/GLOB SERPL: 1 G/DL
ALP SERPL-CCNC: 192 U/L (ref 39–117)
ALT SERPL W P-5'-P-CCNC: 31 U/L (ref 1–41)
ANION GAP SERPL CALCULATED.3IONS-SCNC: 8.9 MMOL/L (ref 5–15)
ANISOCYTOSIS BLD QL: NORMAL
AST SERPL-CCNC: 40 U/L (ref 1–40)
BASOPHILS # BLD AUTO: 0.05 10*3/MM3 (ref 0–0.2)
BASOPHILS NFR BLD AUTO: 1.1 % (ref 0–1.5)
BILIRUB SERPL-MCNC: 0.8 MG/DL (ref 0–1.2)
BUN SERPL-MCNC: 8 MG/DL (ref 8–23)
BUN/CREAT SERPL: 16.7 (ref 7–25)
CALCIUM SPEC-SCNC: 8.3 MG/DL (ref 8.6–10.5)
CHLORIDE SERPL-SCNC: 105 MMOL/L (ref 98–107)
CO2 SERPL-SCNC: 26.1 MMOL/L (ref 22–29)
CREAT SERPL-MCNC: 0.48 MG/DL (ref 0.76–1.27)
DEPRECATED RDW RBC AUTO: 46.1 FL (ref 37–54)
DIMORPHIC RBC: PRESENT
EGFRCR SERPLBLD CKD-EPI 2021: 113.9 ML/MIN/1.73
EOSINOPHIL # BLD AUTO: 0.15 10*3/MM3 (ref 0–0.4)
EOSINOPHIL NFR BLD AUTO: 3.3 % (ref 0.3–6.2)
ERYTHROCYTE [DISTWIDTH] IN BLOOD BY AUTOMATED COUNT: 17.3 % (ref 12.3–15.4)
GLOBULIN UR ELPH-MCNC: 3 GM/DL
GLUCOSE SERPL-MCNC: 148 MG/DL (ref 65–99)
HCT VFR BLD AUTO: 33.1 % (ref 37.5–51)
HGB BLD-MCNC: 9.6 G/DL (ref 13–17.7)
HYPOCHROMIA BLD QL: NORMAL
IMM GRANULOCYTES # BLD AUTO: 0.01 10*3/MM3 (ref 0–0.05)
IMM GRANULOCYTES NFR BLD AUTO: 0.2 % (ref 0–0.5)
LYMPHOCYTES # BLD AUTO: 1.1 10*3/MM3 (ref 0.7–3.1)
LYMPHOCYTES NFR BLD AUTO: 23.9 % (ref 19.6–45.3)
MCH RBC QN AUTO: 21.6 PG (ref 26.6–33)
MCHC RBC AUTO-ENTMCNC: 29 G/DL (ref 31.5–35.7)
MCV RBC AUTO: 74.4 FL (ref 79–97)
MICROCYTES BLD QL: NORMAL
MONOCYTES # BLD AUTO: 0.57 10*3/MM3 (ref 0.1–0.9)
MONOCYTES NFR BLD AUTO: 12.4 % (ref 5–12)
NEUTROPHILS NFR BLD AUTO: 2.72 10*3/MM3 (ref 1.7–7)
NEUTROPHILS NFR BLD AUTO: 59.1 % (ref 42.7–76)
NRBC BLD AUTO-RTO: 0 /100 WBC (ref 0–0.2)
PLATELET # BLD AUTO: 103 10*3/MM3 (ref 140–450)
PMV BLD AUTO: ABNORMAL FL
POLYCHROMASIA BLD QL SMEAR: NORMAL
POTASSIUM SERPL-SCNC: 3.6 MMOL/L (ref 3.5–5.2)
PROT SERPL-MCNC: 6 G/DL (ref 6–8.5)
RBC # BLD AUTO: 4.45 10*6/MM3 (ref 4.14–5.8)
SMALL PLATELETS BLD QL SMEAR: NORMAL
SODIUM SERPL-SCNC: 140 MMOL/L (ref 136–145)
WBC MORPH BLD: NORMAL
WBC NRBC COR # BLD AUTO: 4.6 10*3/MM3 (ref 3.4–10.8)

## 2024-11-25 PROCEDURE — 25810000003 SODIUM CHLORIDE 0.9 % SOLUTION: Performed by: EMERGENCY MEDICINE

## 2024-11-25 PROCEDURE — 80053 COMPREHEN METABOLIC PANEL: CPT | Performed by: EMERGENCY MEDICINE

## 2024-11-25 PROCEDURE — 85025 COMPLETE CBC W/AUTO DIFF WBC: CPT | Performed by: EMERGENCY MEDICINE

## 2024-11-25 PROCEDURE — 99283 EMERGENCY DEPT VISIT LOW MDM: CPT

## 2024-11-25 PROCEDURE — 85007 BL SMEAR W/DIFF WBC COUNT: CPT | Performed by: EMERGENCY MEDICINE

## 2024-11-25 RX ADMIN — SODIUM CHLORIDE 1000 ML: 900 INJECTION, SOLUTION INTRAVENOUS at 18:04

## 2024-11-26 VITALS
HEART RATE: 94 BPM | DIASTOLIC BLOOD PRESSURE: 98 MMHG | TEMPERATURE: 98.6 F | WEIGHT: 315 LBS | HEIGHT: 74 IN | SYSTOLIC BLOOD PRESSURE: 113 MMHG | OXYGEN SATURATION: 97 % | RESPIRATION RATE: 16 BRPM | BODY MASS INDEX: 40.43 KG/M2

## 2024-11-26 NOTE — SIGNIFICANT NOTE
11/25/24 2019   Plan   Plan Comments SW met with patient who reports that he doesn't feel safe going home because he states he is unable to get up. Pt states that he also isn't able to get himself anything to eat. SW made patient aware of the care that has been provided by the hospital both inpatient, SNF, outpatient, home health care and that he does not meet admission criteria at this time, therefore will be discharged home. Pt had a [employer] friend on the phone who plans to help patient contact his insurance to explore if they have resources available for patient in regards to care. Friend stated that she would help take patient food when needed. Pt states that he will be back to the ER in a couple of days because he can't care for himself. Pt made aware that he does not meet admission criteria and therefore would be discharged. EMS will plan to transport patient back home as planned by treatment team.   Final Discharge Disposition Code 01 - home or self-care

## 2024-11-26 NOTE — ED PROVIDER NOTES
Time: 7:45 PM EST  Date of encounter:  11/25/2024  Independent Historian/Clinical History and Information was obtained by:   Patient    History is limited by: N/A    Chief Complaint: Difficulty caring for himself      History of Present Illness:  Patient is a 64 y.o. year old male who presents to the emergency department for evaluation of difficulty caring for himself.  The patient reports that he is unable to wash himself and that he does not walk.  He states that home health has not come out since Saturday.  Patient states that he has not been eating or drinking.  Patient denies chest pain or shortness of breath.      Patient Care Team  Primary Care Provider: Provider, No Known    Past Medical History:     Allergies   Allergen Reactions    Adhesive Tape Rash     Past Medical History:   Diagnosis Date    Absence of toe of right foot     Acute osteomyelitis of left calcaneus  08/18/2021    Anxiety and depression     Arthritis     Cancer     Chronic pain     STATES HIS PAIN IS 10/10 AAT    Claustrophobia     Corns and callus     Diabetic ulcer of left heel associated with type 2 DM 08/18/2021    Diabetic ulcer of left heel associated with type 2 DM 07/06/2021    Diabetic ulcer of right midfoot associated with type 2 DM 08/18/2021    Difficulty walking     Essential hypertension 08/31/2021    Hammertoe     Hyperlipidemia LDL goal <100 08/31/2021    Ingrown toenail     Obesity     Paroxysmal atrial fibrillation 08/31/2021    Polyneuropathy     Pressure ulcer, stage 1     Tinea unguium     Type 2 diabetes mellitus with polyneuropathy      Past Surgical History:   Procedure Laterality Date    CYST REMOVAL      center of back; benign    EYE SURGERY      INCISION AND DRAINAGE ABSCESS      back    INCISION AND DRAINAGE LEG Right 12/10/2021    Procedure: INCISION AND DRAINAGE LOWER EXTREMITY;  Surgeon: Ash Leyva DPM;  Location: [facility] OR;  Service: Podiatry;  Laterality: Right;    OTHER SURGICAL HISTORY       Surgical clips left foot    TOE SURGERY Right     Removal of 5th toe    TRANS METATARSAL AMPUTATION Right 12/02/2021    Procedure: AMPUTATION TRANS METATARSAL;  Surgeon: Ash Leyva DPM;  Location: [facility] MAIN OR;  Service: Podiatry;  Laterality: Right;    VASCULAR SURGERY      WRIST SURGERY Left     repair of injury     Family History   Problem Relation Age of Onset    Hearing loss Mother     Depression Mother     Arthritis Mother     Heart disease Mother     Heart disease Father     Cancer Father         Unspecified    Cancer Sister     Hearing loss Brother     Heart disease Brother     Diabetes Paternal Grandmother     Learning disabilities Nephew     Drug abuse Nephew     COPD Nephew     Alcohol abuse Nephew        Home Medications:  Prior to Admission medications    Medication Sig Start Date End Date Taking? Authorizing Provider   acetaminophen (TYLENOL) 650 MG 8 hr tablet Take 1 tablet by mouth Every 8 (Eight) Hours As Needed for Mild Pain.    Provider, MD Laura   apixaban (ELIQUIS) 5 MG tablet tablet Take 1 tablet by mouth Every 12 (Twelve) Hours. 1/31/23   Delia Cervantes APRN   baclofen (LIORESAL) 10 MG tablet Take 1 tablet by mouth 3 (Three) Times a Day As Needed for Muscle Spasms. 10/24/24   Wendy Zee DO   digoxin (LANOXIN) 125 MCG tablet Take 1 tablet by mouth Daily for 30 days. 6/15/23 9/10/23  Joe Haynes MD   empagliflozin (JARDIANCE) 10 MG tablet tablet Take 1 tablet by mouth Daily. 10/24/24   Wendy Zee DO   insulin detemir (Levemir FlexPen) 100 UNIT/ML injection Inject 40 Units under the skin into the appropriate area as directed 2 (Two) Times a Day for 30 days.  Patient taking differently: Inject 60 Units under the skin into the appropriate area as directed 2 (Two) Times a Day. 6/23/23 9/10/23  Cailin Acosta MD   Insulin Lispro, 1 Unit Dial, (HumaLOG KwikPen) 100 UNIT/ML solution pen-injector Inject subcutaneously 3 times a day with  meals. Use sliding scale. Max daily dose 45units Blood Glucose 150-199- 3units 200-249- 5units 250-299- 8 units 300-349- 10units 350-400- 12units >400- 14units & Call Provider 10/23/24   Wendy Zee DO   metoprolol succinate XL (TOPROL-XL) 25 MG 24 hr tablet Take 0.5 tablets by mouth Daily. 10/24/24   Wendy Zee DO   pregabalin (LYRICA) 25 MG capsule Take 1 capsule by mouth 3 (Three) Times a Day for 3 days. 6/15/23 9/10/23  Joe Haynes MD   sertraline (ZOLOFT) 50 MG tablet Take 1 tablet by mouth Every Night. 10/23/24   Wendy Zee DO   simvastatin (ZOCOR) 20 MG tablet Take 1 tablet by mouth Every Night. 23   Delia Cervantes APRN        Social History:   Social History     Tobacco Use    Smoking status: Former     Current packs/day: 0.00     Types: Cigarettes     Quit date: 1990     Years since quittin.2    Smokeless tobacco: Never    Tobacco comments:     quit at age 30   Vaping Use    Vaping status: Never Used   Substance Use Topics    Alcohol use: Not Currently     Comment: rare    Drug use: Not Currently     Types: Marijuana     Comment: occasionally, EVERY 3-4 MONTH         Review of Systems:  Review of Systems   Constitutional:  Negative for chills and fever.   HENT:  Negative for congestion, rhinorrhea and sore throat.    Eyes:  Negative for pain and visual disturbance.   Respiratory:  Negative for apnea, cough, chest tightness and shortness of breath.    Cardiovascular:  Negative for chest pain and palpitations.   Gastrointestinal:  Negative for abdominal pain, diarrhea, nausea and vomiting.   Genitourinary:  Negative for difficulty urinating and dysuria.   Musculoskeletal:  Negative for joint swelling and myalgias.   Skin:  Negative for color change.   Neurological:  Positive for weakness. Negative for seizures and headaches.   Psychiatric/Behavioral: Negative.     All other systems reviewed and are negative.       Physical Exam:  /72    "Pulse 111   Temp 98.6 °F (37 °C) (Oral)   Resp 16   Ht 188 cm (74.02\")   Wt (!) 166 kg (365 lb 15.4 oz)   SpO2 100%   BMI 46.97 kg/m²     Physical Exam  Vitals and nursing note reviewed.   Constitutional:       General: He is not in acute distress.     Appearance: Normal appearance. He is not toxic-appearing.   HENT:      Head: Normocephalic and atraumatic.      Jaw: There is normal jaw occlusion.   Eyes:      General: Lids are normal.      Extraocular Movements: Extraocular movements intact.      Conjunctiva/sclera: Conjunctivae normal.      Pupils: Pupils are equal, round, and reactive to light.   Cardiovascular:      Rate and Rhythm: Normal rate and regular rhythm.      Pulses: Normal pulses.      Heart sounds: Normal heart sounds.   Pulmonary:      Effort: Pulmonary effort is normal. No respiratory distress.      Breath sounds: Normal breath sounds. No wheezing or rhonchi.   Abdominal:      General: Abdomen is flat.      Palpations: Abdomen is soft.      Tenderness: There is no abdominal tenderness. There is no guarding or rebound.   Musculoskeletal:         General: Normal range of motion.      Cervical back: Normal range of motion and neck supple.      Right lower leg: No edema.      Left lower leg: No edema.   Skin:     General: Skin is warm and dry.   Neurological:      Mental Status: He is alert and oriented to person, place, and time. Mental status is at baseline.   Psychiatric:         Mood and Affect: Mood normal.                  Procedures:  Procedures      Medical Decision Making:      Comorbidities that affect care:    Chronic disability    External Notes reviewed:    Previous ED Note: Previous ED note and labs were reviewed.      The following orders were placed and all results were independently analyzed by me:  Orders Placed This Encounter   Procedures    Comprehensive Metabolic Panel    CBC Auto Differential    Scan Slide    CBC & Differential       Medications Given in the Emergency " Department:  Medications   sodium chloride 0.9 % bolus 1,000 mL (0 mL Intravenous Stopped 11/25/24 1911)        ED Course:         Labs:    Lab Results (last 24 hours)       Procedure Component Value Units Date/Time    CBC & Differential [[id number]]  (Abnormal) Collected: 11/25/24 1803    Specimen: Blood Updated: 11/25/24 1833    Narrative:      The following orders were created for panel order CBC & Differential.  Procedure                               Abnormality         Status                     ---------                               -----------         ------                     CBC Auto Differential[[id number]]        Abnormal            Final result               Scan Slide[[id number]]                                       Final result                 Please view results for these tests on the individual orders.    Comprehensive Metabolic Panel [[id number]]  (Abnormal) Collected: 11/25/24 1803    Specimen: Blood Updated: 11/25/24 1833     Glucose 148 mg/dL      BUN 8 mg/dL      Creatinine 0.48 mg/dL      Sodium 140 mmol/L      Potassium 3.6 mmol/L      Chloride 105 mmol/L      CO2 26.1 mmol/L      Calcium 8.3 mg/dL      Total Protein 6.0 g/dL      Albumin 3.0 g/dL      ALT (SGPT) 31 U/L      AST (SGOT) 40 U/L      Alkaline Phosphatase 192 U/L      Total Bilirubin 0.8 mg/dL      Globulin 3.0 gm/dL      A/G Ratio 1.0 g/dL      BUN/Creatinine Ratio 16.7     Anion Gap 8.9 mmol/L      eGFR 113.9 mL/min/1.73     Narrative:      GFR Normal >60  Chronic Kidney Disease <60  Kidney Failure <15      CBC Auto Differential [[id number]]  (Abnormal) Collected: 11/25/24 1803    Specimen: Blood Updated: 11/25/24 1833     WBC 4.60 10*3/mm3      RBC 4.45 10*6/mm3      Hemoglobin 9.6 g/dL      Hematocrit 33.1 %      MCV 74.4 fL      MCH 21.6 pg      MCHC 29.0 g/dL      RDW 17.3 %      RDW-SD 46.1 fl      MPV --     Comment: Unable to result due to specimen interference.        Platelets 103 10*3/mm3      Neutrophil % 59.1 %       Lymphocyte % 23.9 %      Monocyte % 12.4 %      Eosinophil % 3.3 %      Basophil % 1.1 %      Immature Grans % 0.2 %      Neutrophils, Absolute 2.72 10*3/mm3      Lymphocytes, Absolute 1.10 10*3/mm3      Monocytes, Absolute 0.57 10*3/mm3      Eosinophils, Absolute 0.15 10*3/mm3      Basophils, Absolute 0.05 10*3/mm3      Immature Grans, Absolute 0.01 10*3/mm3      nRBC 0.0 /100 WBC     Scan Slide [[id number]] Collected: 11/25/24 1803    Specimen: Blood Updated: 11/25/24 1833     Anisocytosis Slight/1+     Dimorphic RBC Present     Hypochromia Slight/1+     Microcytes Slight/1+     Polychromasia Slight/1+     WBC Morphology Normal     Platelet Estimate Decreased             Imaging:    No Radiology Exams Resulted Within Past 24 Hours      Differential Diagnosis and Discussion:    Metabolic: Differential diagnosis includes but is not limited to hypertension, hyperglycemia, hyperkalemia, hypocalcemia, metabolic acidosis, hypokalemia, hypoglycemia, malnutrition, hypothyroidism, hyperthyroidism, and adrenal insufficiency.     All labs were reviewed and interpreted by me.    MDM     The patient´s CBC that was reviewed and interpreted by me shows no abnormalities of critical concern. Of note, there is no anemia requiring a blood transfusion and the platelet count is acceptable.  The patient´s CMP that was reviewed and interpretted by me shows no abnormalities of critical concern. Of note, the patient´s sodium and potassium are acceptable. The patient´s liver enzymes are unremarkable. The patient´s renal function (creatinine) is preserved. The patient has a normal anion gap.                Patient Care Considerations:    CT ABDOMEN AND PELVIS: I considered ordering a CT scan of the abdomen and pelvis however abdomen is soft and nontender.      Consultants/Shared Management Plan:    Case was discussed with  who evaluated the patient.  The patient has a friend that is been giving him food.  The patient has no  medical abnormalities warranting admission and is stable and suitable for discharge.    Social Determinants of Health:     was consulted and the patient does have a home health nurse.      Disposition and Care Coordination:    Discharged: I considered escalation of care by admitting this patient to the hospital, however blood work is unremarkable.    I have explained the patient´s condition, diagnoses and treatment plan based on the information available to me at this time. I have answered questions and addressed any concerns. The patient has a good  understanding of the patient´s diagnosis, condition, and treatment plan as can be expected at this point. The vital signs have been stable. The patient´s condition is stable and appropriate for discharge from the emergency department.      The patient will pursue further outpatient evaluation with the primary care physician or other designated or consulting physician as outlined in the discharge instructions. They are agreeable to this plan of care and follow-up instructions have been explained in detail. The patient has received these instructions in written format and has expressed an understanding of the discharge instructions. The patient is aware that any significant change in condition or worsening of symptoms should prompt an immediate return to this or the closest emergency department or call to 911.  I have explained discharge medications and the need for follow up with the patient/caretakers. This was also printed in the discharge instructions. Patient was discharged with the following medications and follow up:      Medication List        Changed      Levemir FlexPen 100 UNIT/ML injection  Generic drug: insulin detemir  Inject 40 Units under the skin into the appropriate area as directed 2 (Two) Times a Day for 30 days.  What changed: how much to take           Provider, No Known  [facility]  [place] [address] [id number]             Final  diagnoses:   Failure to thrive in adult        ED Disposition       ED Disposition   Discharge    Condition   Stable    Comment   --               This medical record created using voice recognition software.             Brown Vasquez MD  11/25/24 2033

## 2024-11-27 ENCOUNTER — HOSPITAL ENCOUNTER (EMERGENCY)
Facility: HOSPITAL | Age: 64
Discharge: HOME | End: 2024-11-28
Attending: EMERGENCY MEDICINE | Admitting: EMERGENCY MEDICINE
Payer: MEDICARE

## 2024-11-27 VITALS
OXYGEN SATURATION: 98 % | DIASTOLIC BLOOD PRESSURE: 68 MMHG | RESPIRATION RATE: 20 BRPM | TEMPERATURE: 97.5 F | SYSTOLIC BLOOD PRESSURE: 102 MMHG | HEART RATE: 149 BPM

## 2024-11-27 DIAGNOSIS — R53.1 GENERALIZED WEAKNESS: Primary | ICD-10-CM

## 2024-11-27 LAB
ALBUMIN SERPL-MCNC: 2.7 G/DL (ref 3.5–5.2)
ALBUMIN/GLOB SERPL: 1 G/DL
ALP SERPL-CCNC: 162 U/L (ref 39–117)
ALT SERPL W P-5'-P-CCNC: 23 U/L (ref 1–41)
ANION GAP SERPL CALCULATED.3IONS-SCNC: 7.2 MMOL/L (ref 5–15)
ANISOCYTOSIS BLD QL: NORMAL
AST SERPL-CCNC: 30 U/L (ref 1–40)
BASOPHILS # BLD AUTO: 0.03 10*3/MM3 (ref 0–0.2)
BASOPHILS NFR BLD AUTO: 0.7 % (ref 0–1.5)
BILIRUB SERPL-MCNC: 1.1 MG/DL (ref 0–1.2)
BUN SERPL-MCNC: 6 MG/DL (ref 8–23)
BUN/CREAT SERPL: 14.6 (ref 7–25)
CALCIUM SPEC-SCNC: 7.8 MG/DL (ref 8.6–10.5)
CHLORIDE SERPL-SCNC: 105 MMOL/L (ref 98–107)
CO2 SERPL-SCNC: 25.8 MMOL/L (ref 22–29)
CREAT SERPL-MCNC: 0.41 MG/DL (ref 0.76–1.27)
DEPRECATED RDW RBC AUTO: 46.5 FL (ref 37–54)
DIGOXIN SERPL-MCNC: <0.3 NG/ML (ref 0.6–1.2)
DIMORPHIC RBC: PRESENT
EGFRCR SERPLBLD CKD-EPI 2021: 119.4 ML/MIN/1.73
EOSINOPHIL # BLD AUTO: 0.15 10*3/MM3 (ref 0–0.4)
EOSINOPHIL NFR BLD AUTO: 3.7 % (ref 0.3–6.2)
ERYTHROCYTE [DISTWIDTH] IN BLOOD BY AUTOMATED COUNT: 17.4 % (ref 12.3–15.4)
GLOBULIN UR ELPH-MCNC: 2.6 GM/DL
GLUCOSE SERPL-MCNC: 157 MG/DL (ref 65–99)
HCT VFR BLD AUTO: 29.8 % (ref 37.5–51)
HGB BLD-MCNC: 8.7 G/DL (ref 13–17.7)
HYPOCHROMIA BLD QL: NORMAL
IMM GRANULOCYTES # BLD AUTO: 0.01 10*3/MM3 (ref 0–0.05)
IMM GRANULOCYTES NFR BLD AUTO: 0.2 % (ref 0–0.5)
LYMPHOCYTES # BLD AUTO: 0.84 10*3/MM3 (ref 0.7–3.1)
LYMPHOCYTES NFR BLD AUTO: 20.6 % (ref 19.6–45.3)
MCH RBC QN AUTO: 21.5 PG (ref 26.6–33)
MCHC RBC AUTO-ENTMCNC: 29.2 G/DL (ref 31.5–35.7)
MCV RBC AUTO: 73.8 FL (ref 79–97)
MONOCYTES # BLD AUTO: 0.52 10*3/MM3 (ref 0.1–0.9)
MONOCYTES NFR BLD AUTO: 12.8 % (ref 5–12)
NEUTROPHILS NFR BLD AUTO: 2.52 10*3/MM3 (ref 1.7–7)
NEUTROPHILS NFR BLD AUTO: 62 % (ref 42.7–76)
NRBC BLD AUTO-RTO: 0 /100 WBC (ref 0–0.2)
OVALOCYTES BLD QL SMEAR: NORMAL
PLATELET # BLD AUTO: 107 10*3/MM3 (ref 140–450)
PMV BLD AUTO: ABNORMAL FL
POLYCHROMASIA BLD QL SMEAR: NORMAL
POTASSIUM SERPL-SCNC: 3.6 MMOL/L (ref 3.5–5.2)
PROT SERPL-MCNC: 5.3 G/DL (ref 6–8.5)
RBC # BLD AUTO: 4.04 10*6/MM3 (ref 4.14–5.8)
SMALL PLATELETS BLD QL SMEAR: NORMAL
SODIUM SERPL-SCNC: 138 MMOL/L (ref 136–145)
WBC MORPH BLD: NORMAL
WBC NRBC COR # BLD AUTO: 4.07 10*3/MM3 (ref 3.4–10.8)

## 2024-11-27 PROCEDURE — 80053 COMPREHEN METABOLIC PANEL: CPT | Performed by: EMERGENCY MEDICINE

## 2024-11-27 PROCEDURE — 85007 BL SMEAR W/DIFF WBC COUNT: CPT | Performed by: EMERGENCY MEDICINE

## 2024-11-27 PROCEDURE — 85025 COMPLETE CBC W/AUTO DIFF WBC: CPT | Performed by: EMERGENCY MEDICINE

## 2024-11-27 PROCEDURE — 80162 ASSAY OF DIGOXIN TOTAL: CPT | Performed by: EMERGENCY MEDICINE

## 2024-11-27 PROCEDURE — 36415 COLL VENOUS BLD VENIPUNCTURE: CPT

## 2024-11-27 PROCEDURE — 99283 EMERGENCY DEPT VISIT LOW MDM: CPT

## 2024-11-27 NOTE — ED PROVIDER NOTES
Time: 2:25 PM EST  Date of encounter:  11/27/2024  Independent Historian/Clinical History and Information was obtained by:   Patient and EMS    History is limited by: N/A    Chief Complaint: Generalized weakness      History of Present Illness:  Patient is a 64 y.o. year old male who presents to the emergency department for evaluation of generalized weakness and requests someone to clean him up.      Patient Care Team  Primary Care Provider: Provider, No Known    Past Medical History:     Allergies   Allergen Reactions    Adhesive Tape Rash     Past Medical History:   Diagnosis Date    Absence of toe of right foot     Acute osteomyelitis of left calcaneus  08/18/2021    Anxiety and depression     Arthritis     Cancer     Chronic pain     STATES HIS PAIN IS 10/10 AAT    Claustrophobia     Corns and callus     Diabetic ulcer of left heel associated with type 2 DM 08/18/2021    Diabetic ulcer of left heel associated with type 2 DM 07/06/2021    Diabetic ulcer of right midfoot associated with type 2 DM 08/18/2021    Difficulty walking     Essential hypertension 08/31/2021    Hammertoe     Hyperlipidemia LDL goal <100 08/31/2021    Ingrown toenail     Obesity     Paroxysmal atrial fibrillation 08/31/2021    Polyneuropathy     Pressure ulcer, stage 1     Tinea unguium     Type 2 diabetes mellitus with polyneuropathy      Past Surgical History:   Procedure Laterality Date    CYST REMOVAL      center of back; benign    EYE SURGERY      INCISION AND DRAINAGE ABSCESS      back    INCISION AND DRAINAGE LEG Right 12/10/2021    Procedure: INCISION AND DRAINAGE LOWER EXTREMITY;  Surgeon: Ash Leyva DPM;  Location: [facility] OR;  Service: Podiatry;  Laterality: Right;    OTHER SURGICAL HISTORY      Surgical clips left foot    TOE SURGERY Right     Removal of 5th toe    TRANS METATARSAL AMPUTATION Right 12/02/2021    Procedure: AMPUTATION TRANS METATARSAL;  Surgeon: Ash Leyva DPM;  Location: [facility]  OR;  Service: Podiatry;  Laterality: Right;    VASCULAR SURGERY      WRIST SURGERY Left     repair of injury     Family History   Problem Relation Age of Onset    Hearing loss Mother     Depression Mother     Arthritis Mother     Heart disease Mother     Heart disease Father     Cancer Father         Unspecified    Cancer Sister     Hearing loss Brother     Heart disease Brother     Diabetes Paternal Grandmother     Learning disabilities Nephew     Drug abuse Nephew     COPD Nephew     Alcohol abuse Nephew        Home Medications:  Prior to Admission medications    Medication Sig Start Date End Date Taking? Authorizing Provider   acetaminophen (TYLENOL) 650 MG 8 hr tablet Take 1 tablet by mouth Every 8 (Eight) Hours As Needed for Mild Pain.    Provider, MD Laura   apixaban (ELIQUIS) 5 MG tablet tablet Take 1 tablet by mouth Every 12 (Twelve) Hours. 1/31/23   Delia Cervantes APRN   baclofen (LIORESAL) 10 MG tablet Take 1 tablet by mouth 3 (Three) Times a Day As Needed for Muscle Spasms. 10/24/24   Wendy Zee DO   digoxin (LANOXIN) 125 MCG tablet Take 1 tablet by mouth Daily for 30 days. 6/15/23 9/10/23  Joe Haynes MD   empagliflozin (JARDIANCE) 10 MG tablet tablet Take 1 tablet by mouth Daily. 10/24/24   Wendy Zee DO   insulin detemir (Levemir FlexPen) 100 UNIT/ML injection Inject 40 Units under the skin into the appropriate area as directed 2 (Two) Times a Day for 30 days.  Patient taking differently: Inject 60 Units under the skin into the appropriate area as directed 2 (Two) Times a Day. 6/23/23 9/10/23  Cailin Acosta MD   Insulin Lispro, 1 Unit Dial, (HumaLOG KwikPen) 100 UNIT/ML solution pen-injector Inject subcutaneously 3 times a day with meals. Use sliding scale. Max daily dose 45units Blood Glucose 150-199- 3units 200-249- 5units 250-299- 8 units 300-349- 10units 350-400- 12units >400- 14units & Call Provider 10/23/24   Wendy Zee DO   metoprolol  succinate XL (TOPROL-XL) 25 MG 24 hr tablet Take 0.5 tablets by mouth Daily. 10/24/24   Wendy Zee DO   pregabalin (LYRICA) 25 MG capsule Take 1 capsule by mouth 3 (Three) Times a Day for 3 days. 6/15/23 9/10/23  Joe Haynes MD   sertraline (ZOLOFT) 50 MG tablet Take 1 tablet by mouth Every Night. 10/23/24   Wendy Zee DO   simvastatin (ZOCOR) 20 MG tablet Take 1 tablet by mouth Every Night. 23   Delia Cervantes, APRN        Social History:   Social History     Tobacco Use    Smoking status: Former     Current packs/day: 0.00     Types: Cigarettes     Quit date: 1990     Years since quittin.2    Smokeless tobacco: Never    Tobacco comments:     quit at age 30   Vaping Use    Vaping status: Never Used   Substance Use Topics    Alcohol use: Not Currently     Comment: rare    Drug use: Not Currently     Types: Marijuana     Comment: occasionally, EVERY 3-4 MONTH         Review of Systems:  Review of Systems   Constitutional:  Negative for chills and fever.   HENT:  Negative for congestion, rhinorrhea and sore throat.    Eyes:  Negative for pain and visual disturbance.   Respiratory:  Negative for apnea, cough, chest tightness and shortness of breath.    Cardiovascular:  Negative for chest pain and palpitations.   Gastrointestinal:  Negative for abdominal pain, diarrhea, nausea and vomiting.   Genitourinary:  Negative for difficulty urinating and dysuria.   Musculoskeletal:  Negative for joint swelling and myalgias.   Skin:  Negative for color change.   Neurological:  Negative for seizures and headaches.   Psychiatric/Behavioral: Negative.     All other systems reviewed and are negative.       Physical Exam:  /68 (BP Location: Left arm, Patient Position: Lying)   Pulse (!) 149   Temp 97.5 °F (36.4 °C) (Oral)   Resp 20   SpO2 98%     Physical Exam  Vitals and nursing note reviewed.   Constitutional:       General: He is not in acute distress.     Appearance: He is  obese. He is ill-appearing. He is not toxic-appearing.   HENT:      Head: Normocephalic and atraumatic.      Jaw: There is normal jaw occlusion.   Eyes:      General: Lids are normal.      Extraocular Movements: Extraocular movements intact.      Conjunctiva/sclera: Conjunctivae normal.      Pupils: Pupils are equal, round, and reactive to light.   Cardiovascular:      Rate and Rhythm: Normal rate and regular rhythm.      Pulses: Normal pulses.      Heart sounds: Normal heart sounds.   Pulmonary:      Effort: Pulmonary effort is normal. No respiratory distress.      Breath sounds: Normal breath sounds. No wheezing or rhonchi.   Abdominal:      General: Abdomen is flat.      Palpations: Abdomen is soft.      Tenderness: There is no abdominal tenderness. There is no guarding or rebound.   Musculoskeletal:         General: Normal range of motion.      Cervical back: Normal range of motion and neck supple.      Right lower leg: No edema.      Left lower leg: No edema.   Skin:     General: Skin is warm and dry.   Neurological:      Mental Status: He is alert and oriented to person, place, and time. Mental status is at baseline.   Psychiatric:         Mood and Affect: Mood normal.                  Procedures:  Procedures      Medical Decision Making:      Comorbidities that affect care:    Obesity, diabetes, hypertension    External Notes reviewed:    None      The following orders were placed and all results were independently analyzed by me:  Orders Placed This Encounter   Procedures    Comprehensive Metabolic Panel    Digoxin Level    CBC Auto Differential    Scan Slide    CBC & Differential       Medications Given in the Emergency Department:  Medications - No data to display     ED Course:         Labs:    Lab Results (last 24 hours)       Procedure Component Value Units Date/Time    CBC & Differential [[id number]]  (Abnormal) Collected: 11/27/24 1330    Specimen: Blood from Arm, Right Updated: 11/27/24 2346     Narrative:      The following orders were created for panel order CBC & Differential.  Procedure                               Abnormality         Status                     ---------                               -----------         ------                     CBC Auto Differential[[id number]]        Abnormal            Final result               Scan Slide[[id number]]                                       Final result                 Please view results for these tests on the individual orders.    Comprehensive Metabolic Panel [[id number]]  (Abnormal) Collected: 11/27/24 1330    Specimen: Blood from Arm, Right Updated: 11/27/24 1354     Glucose 157 mg/dL      BUN 6 mg/dL      Creatinine 0.41 mg/dL      Sodium 138 mmol/L      Potassium 3.6 mmol/L      Chloride 105 mmol/L      CO2 25.8 mmol/L      Calcium 7.8 mg/dL      Total Protein 5.3 g/dL      Albumin 2.7 g/dL      ALT (SGPT) 23 U/L      AST (SGOT) 30 U/L      Alkaline Phosphatase 162 U/L      Total Bilirubin 1.1 mg/dL      Globulin 2.6 gm/dL      A/G Ratio 1.0 g/dL      BUN/Creatinine Ratio 14.6     Anion Gap 7.2 mmol/L      eGFR 119.4 mL/min/1.73     Narrative:      GFR Normal >60  Chronic Kidney Disease <60  Kidney Failure <15      Digoxin Level [[id number]]  (Abnormal) Collected: 11/27/24 1330    Specimen: Blood from Arm, Right Updated: 11/27/24 1358     Digoxin <0.30 ng/mL     CBC Auto Differential [[id number]]  (Abnormal) Collected: 11/27/24 1330    Specimen: Blood from Arm, Right Updated: 11/27/24 1403     WBC 4.07 10*3/mm3      RBC 4.04 10*6/mm3      Hemoglobin 8.7 g/dL      Hematocrit 29.8 %      MCV 73.8 fL      MCH 21.5 pg      MCHC 29.2 g/dL      RDW 17.4 %      RDW-SD 46.5 fl      MPV --     Comment: Result not available due to specimen interference.        Platelets 107 10*3/mm3      Neutrophil % 62.0 %      Lymphocyte % 20.6 %      Monocyte % 12.8 %      Eosinophil % 3.7 %      Basophil % 0.7 %      Immature Grans % 0.2 %      Neutrophils, Absolute 2.52  10*3/mm3      Lymphocytes, Absolute 0.84 10*3/mm3      Monocytes, Absolute 0.52 10*3/mm3      Eosinophils, Absolute 0.15 10*3/mm3      Basophils, Absolute 0.03 10*3/mm3      Immature Grans, Absolute 0.01 10*3/mm3      nRBC 0.0 /100 WBC     Scan Slide [[id number]] Collected: 11/27/24 1330    Specimen: Blood from Arm, Right Updated: 11/27/24 1403     Anisocytosis Slight/1+     Dimorphic RBC Present     Hypochromia Slight/1+     Ovalocytes Slight/1+     Polychromasia Slight/1+     WBC Morphology Normal     Platelet Estimate Decreased             Imaging:    No Radiology Exams Resulted Within Past 24 Hours      Differential Diagnosis and Discussion:    Metabolic: Differential diagnosis includes but is not limited to hypertension, hyperglycemia, hyperkalemia, hypocalcemia, metabolic acidosis, hypokalemia, hypoglycemia, malnutrition, hypothyroidism, hyperthyroidism, and adrenal insufficiency.     All labs were reviewed and interpreted by me.    MDM  Number of Diagnoses or Management Options  Generalized weakness  Diagnosis management comments: In summary this is a 64-year-old morbidly obese male patient who presents to the emergency department for evaluation.  His only complaint is that he requests our staff to clean him up.  CBC independently reviewed and interpreted by me and shows no critical abnormalities.  CMP independently reviewed and interpreted by me and shows no critical abnormalities.  Patient was appears at his baseline.  Very strict return to ER and follow-up instructions have been provided to the patient.                         Patient Care Considerations:    ANTIBIOTICS: I considered prescribing antibiotics as an outpatient however no bacterial focus of infection was found.      Consultants/Shared Management Plan:    None    Social Determinants of Health:     was consulted and patient will be discharged home      Disposition and Care Coordination:    Discharged: The patient is suitable and  stable for discharge with no need for consideration of admission.    I have explained the patient´s condition, diagnoses and treatment plan based on the information available to me at this time. I have answered questions and addressed any concerns. The patient has a good  understanding of the patient´s diagnosis, condition, and treatment plan as can be expected at this point. The vital signs have been stable. The patient´s condition is stable and appropriate for discharge from the emergency department.      The patient will pursue further outpatient evaluation with the primary care physician or other designated or consulting physician as outlined in the discharge instructions. They are agreeable to this plan of care and follow-up instructions have been explained in detail. The patient has received these instructions in written format and has expressed an understanding of the discharge instructions. The patient is aware that any significant change in condition or worsening of symptoms should prompt an immediate return to this or the closest emergency department or call to 911.  I have explained discharge medications and the need for follow up with the patient/caretakers. This was also printed in the discharge instructions. Patient was discharged with the following medications and follow up:      Medication List        Changed      Levemir FlexPen 100 UNIT/ML injection  Generic drug: insulin detemir  Inject 40 Units under the skin into the appropriate area as directed 2 (Two) Times a Day for 30 days.  What changed: how much to take           Provider, No Known  [facility]  [place] [address] [id number]    In 1 week         Final diagnoses:   Generalized weakness        ED Disposition       ED Disposition   Discharge    Condition   Stable    Comment   --               This medical record created using voice recognition software.             Sergio De La Paz MD  11/27/24 8503

## 2024-11-30 ENCOUNTER — HOSPITAL ENCOUNTER (EMERGENCY)
Facility: HOSPITAL | Age: 64
Discharge: HOME | End: 2024-11-30
Attending: EMERGENCY MEDICINE
Payer: MEDICARE

## 2024-11-30 VITALS
DIASTOLIC BLOOD PRESSURE: 80 MMHG | OXYGEN SATURATION: 99 % | BODY MASS INDEX: 40.43 KG/M2 | HEIGHT: 74 IN | RESPIRATION RATE: 18 BRPM | SYSTOLIC BLOOD PRESSURE: 99 MMHG | TEMPERATURE: 98.5 F | WEIGHT: 315 LBS | HEART RATE: 81 BPM

## 2024-11-30 DIAGNOSIS — R53.1 GENERALIZED WEAKNESS: Primary | ICD-10-CM

## 2024-11-30 PROCEDURE — 99283 EMERGENCY DEPT VISIT LOW MDM: CPT

## 2024-11-30 NOTE — ED TRIAGE NOTES
Patient to ED via [facility] from home after having a bowel movement on himself and would like help cleaning himself up.   Per EMS, patient has no one to help him at home and is waiting on his new home health nurse to start.

## 2024-11-30 NOTE — ED PROVIDER NOTES
Time: 3:23 PM EST  Date of encounter:  11/30/2024  Independent Historian/Clinical History and Information was obtained by:   Patient    History is limited by: N/A    Chief Complaint: Generalized weakness      History of Present Illness:  Patient is a 64 y.o. year old male who presents to the emergency department for evaluation of generalized weakness.  Patient has very frequent ED visitation for the same issue.  Presents today initially just to be cleaned up from his diarrhea as he cannot get out of bed on his own.  The patient has a prolonged admission with inpatient rehab followed by outpatient home health.  He is currently trying to get further outside assistance in the form of physical therapy.  As it stands, he states he is unable to ambulate due to chronic hip pain that needs surgery if he can lose enough weight.      Patient Care Team  Primary Care Provider: Provider, No Known    Past Medical History:     Allergies   Allergen Reactions    Adhesive Tape Rash     Past Medical History:   Diagnosis Date    Absence of toe of right foot     Acute osteomyelitis of left calcaneus  08/18/2021    Anxiety and depression     Arthritis     Cancer     Chronic pain     STATES HIS PAIN IS 10/10 AAT    Claustrophobia     Corns and callus     Diabetic ulcer of left heel associated with type 2 DM 08/18/2021    Diabetic ulcer of left heel associated with type 2 DM 07/06/2021    Diabetic ulcer of right midfoot associated with type 2 DM 08/18/2021    Difficulty walking     Essential hypertension 08/31/2021    Hammertoe     Hyperlipidemia LDL goal <100 08/31/2021    Ingrown toenail     Obesity     Paroxysmal atrial fibrillation 08/31/2021    Polyneuropathy     Pressure ulcer, stage 1     Tinea unguium     Type 2 diabetes mellitus with polyneuropathy      Past Surgical History:   Procedure Laterality Date    CYST REMOVAL      center of back; benign    EYE SURGERY      INCISION AND DRAINAGE ABSCESS      back    INCISION AND DRAINAGE  LEG Right 12/10/2021    Procedure: INCISION AND DRAINAGE LOWER EXTREMITY;  Surgeon: Ash Leyva DPM;  Location: [facility] MAIN OR;  Service: Podiatry;  Laterality: Right;    OTHER SURGICAL HISTORY      Surgical clips left foot    TOE SURGERY Right     Removal of 5th toe    TRANS METATARSAL AMPUTATION Right 12/02/2021    Procedure: AMPUTATION TRANS METATARSAL;  Surgeon: Ash Leyva DPM;  Location: [facility] MAIN OR;  Service: Podiatry;  Laterality: Right;    VASCULAR SURGERY      WRIST SURGERY Left     repair of injury     Family History   Problem Relation Age of Onset    Hearing loss Mother     Depression Mother     Arthritis Mother     Heart disease Mother     Heart disease Father     Cancer Father         Unspecified    Cancer Sister     Hearing loss Brother     Heart disease Brother     Diabetes Paternal Grandmother     Learning disabilities Nephew     Drug abuse Nephew     COPD Nephew     Alcohol abuse Nephew        Home Medications:  Prior to Admission medications    Medication Sig Start Date End Date Taking? Authorizing Provider   acetaminophen (TYLENOL) 650 MG 8 hr tablet Take 1 tablet by mouth Every 8 (Eight) Hours As Needed for Mild Pain.    Provider, MD Laura   apixaban (ELIQUIS) 5 MG tablet tablet Take 1 tablet by mouth Every 12 (Twelve) Hours. 1/31/23   Delia Cervantes APRN   baclofen (LIORESAL) 10 MG tablet Take 1 tablet by mouth 3 (Three) Times a Day As Needed for Muscle Spasms. 10/24/24   Wendy Zee DO   digoxin (LANOXIN) 125 MCG tablet Take 1 tablet by mouth Daily for 30 days. 6/15/23 9/10/23  Joe Haynes MD   empagliflozin (JARDIANCE) 10 MG tablet tablet Take 1 tablet by mouth Daily. 10/24/24   Wendy Zee DO   insulin detemir (Levemir FlexPen) 100 UNIT/ML injection Inject 40 Units under the skin into the appropriate area as directed 2 (Two) Times a Day for 30 days.  Patient taking differently: Inject 60 Units under the skin into the  appropriate area as directed 2 (Two) Times a Day. 6/23/23 9/10/23  Cailin Acosta MD   Insulin Lispro, 1 Unit Dial, (HumaLOG KwikPen) 100 UNIT/ML solution pen-injector Inject subcutaneously 3 times a day with meals. Use sliding scale. Max daily dose 45units Blood Glucose 150-199- 3units 200-249- 5units 250-299- 8 units 300-349- 10units 350-400- 12units >400- 14units & Call Provider 10/23/24   Wendy Zee DO   metoprolol succinate XL (TOPROL-XL) 25 MG 24 hr tablet Take 0.5 tablets by mouth Daily. 10/24/24   Wendy Zee DO   pregabalin (LYRICA) 25 MG capsule Take 1 capsule by mouth 3 (Three) Times a Day for 3 days. 6/15/23 9/10/23  Joe Haynes MD   sertraline (ZOLOFT) 50 MG tablet Take 1 tablet by mouth Every Night. 10/23/24   Wendy Zee DO   simvastatin (ZOCOR) 20 MG tablet Take 1 tablet by mouth Every Night. 23   Delia Cervantes, APRN        Social History:   Social History     Tobacco Use    Smoking status: Former     Current packs/day: 0.00     Types: Cigarettes     Quit date: 1990     Years since quittin.2    Smokeless tobacco: Never    Tobacco comments:     quit at age 30   Vaping Use    Vaping status: Never Used   Substance Use Topics    Alcohol use: Not Currently     Comment: rare    Drug use: Not Currently     Types: Marijuana     Comment: occasionally, EVERY 3-4 MONTH         Review of Systems:  Review of Systems   Constitutional:  Positive for fatigue. Negative for chills and fever.   HENT:  Negative for congestion, rhinorrhea and sore throat.    Eyes:  Negative for photophobia.   Respiratory:  Negative for apnea, cough, chest tightness and shortness of breath.    Cardiovascular:  Negative for chest pain and palpitations.   Gastrointestinal:  Positive for diarrhea. Negative for abdominal pain, nausea and vomiting.   Endocrine: Negative.    Genitourinary:  Negative for difficulty urinating and dysuria.   Musculoskeletal:  Negative for back  "pain, joint swelling and myalgias.   Skin:  Negative for color change and wound.   Allergic/Immunologic: Negative.    Neurological:  Positive for weakness. Negative for seizures and headaches.   Psychiatric/Behavioral: Negative.     All other systems reviewed and are negative.       Physical Exam:  /62 (BP Location: Right arm, Patient Position: Lying)   Pulse 85   Temp 98.5 °F (36.9 °C) (Oral)   Resp 19   Ht 188 cm (74\")   Wt (!) 174 kg (383 lb 9.6 oz)   SpO2 98%   BMI 49.25 kg/m²     Physical Exam  Vitals and nursing note reviewed.   Constitutional:       General: He is awake.      Appearance: He is obese.   HENT:      Head: Normocephalic and atraumatic.      Nose: Nose normal.      Mouth/Throat:      Mouth: Mucous membranes are moist.   Eyes:      Extraocular Movements: Extraocular movements intact.      Pupils: Pupils are equal, round, and reactive to light.   Cardiovascular:      Rate and Rhythm: Normal rate and regular rhythm.      Heart sounds: Normal heart sounds.   Pulmonary:      Effort: Pulmonary effort is normal. No respiratory distress.      Breath sounds: Normal breath sounds. No wheezing, rhonchi or rales.   Abdominal:      General: Bowel sounds are normal.      Palpations: Abdomen is soft.      Tenderness: There is no abdominal tenderness. There is no guarding or rebound.      Comments: No rigidity   Musculoskeletal:         General: No tenderness. Normal range of motion.      Cervical back: Normal range of motion and neck supple.   Skin:     General: Skin is warm and dry.      Coloration: Skin is not jaundiced.   Neurological:      General: No focal deficit present.      Mental Status: Mental status is at baseline.   Psychiatric:         Mood and Affect: Mood normal.                  Procedures:  Procedures      Medical Decision Making:      Comorbidities that affect care:    Type 2 diabetes, C. difficile, hypertension, anxiety/depression    External Notes reviewed:    Previous Clinic " Note: Office visit 11/21/2024.  Description: Chronic low back pain, essential hypertension      The following orders were placed and all results were independently analyzed by me:  No orders of the defined types were placed in this encounter.      Medications Given in the Emergency Department:  Medications - No data to display     ED Course:    ED Course as of 11/30/24 1556   Sat Nov 30, 2024   1459 Fifth ED visit in just over 3 weeks.  All chronic complaints and essentially the patient is now here yet again because the hospital provided and paid for home health has elapsed. [RP]      ED Course User Index  [RP] Johny Whitmore MD       Labs:    Lab Results (last 24 hours)       ** No results found for the last 24 hours. **             Imaging:    No Radiology Exams Resulted Within Past 24 Hours      Differential Diagnosis and Discussion:    Weakness: Based on the patient's history, signs, and symptoms, the diffential diagnosis includes but is not limited to meningitis, stroke, sepsis, subarachnoid hemorrhage, intracranial bleeding, encephalitis, acute uti, dehydration, MS, myasthenia gravis, Gu[place], migraine variant, neuromuscular disorders vertigo, electrolyte imbalance, and metabolic disorders.        [facility]                     Patient Care Considerations:    LABS: I considered ordering labs, however the patient has had labs drawn 3-4 times this month alone.  He shows no evidence of hemodynamic instability and once again repeating his blood work seems unhelpful today.      Consultants/Shared Management Plan:    None    Social Determinants of Health:    Patient is independent, reliable, and has access to care.       Disposition and Care Coordination:    Discharged: The patient is suitable and stable for discharge with no need for consideration of admission.    I have explained the patient´s condition, diagnoses and treatment plan based on the information available to me at this time. I have answered  questions and addressed any concerns. The patient has a good  understanding of the patient´s diagnosis, condition, and treatment plan as can be expected at this point. The vital signs have been stable. The patient´s condition is stable and appropriate for discharge from the emergency department.      The patient will pursue further outpatient evaluation with the primary care physician or other designated or consulting physician as outlined in the discharge instructions. They are agreeable to this plan of care and follow-up instructions have been explained in detail. The patient has received these instructions in written format and has expressed an understanding of the discharge instructions. The patient is aware that any significant change in condition or worsening of symptoms should prompt an immediate return to this or the closest emergency department or call to 911.    Final diagnoses:   Generalized weakness        ED Disposition       ED Disposition   Discharge    Condition   Stable    Comment   --               This medical record created using voice recognition software.             Johny Whitmore MD  11/30/24 0085

## 2024-11-30 NOTE — DISCHARGE INSTRUCTIONS
Please do your own physical therapy as we discussed. Regaining strength in your arms and legs can be done without any outside assistance if you truly set your mind on it.

## 2024-12-06 ENCOUNTER — HOSPITAL ENCOUNTER (EMERGENCY)
Facility: HOSPITAL | Age: 64
Discharge: HOME | End: 2024-12-07
Attending: EMERGENCY MEDICINE
Payer: MEDICARE

## 2024-12-06 DIAGNOSIS — R19.7 DIARRHEA, UNSPECIFIED TYPE: Primary | ICD-10-CM

## 2024-12-06 PROCEDURE — 99283 EMERGENCY DEPT VISIT LOW MDM: CPT

## 2024-12-07 VITALS
WEIGHT: 315 LBS | OXYGEN SATURATION: 99 % | TEMPERATURE: 98.2 F | SYSTOLIC BLOOD PRESSURE: 100 MMHG | HEIGHT: 74 IN | RESPIRATION RATE: 18 BRPM | DIASTOLIC BLOOD PRESSURE: 55 MMHG | HEART RATE: 89 BPM | BODY MASS INDEX: 40.43 KG/M2

## 2024-12-07 LAB
ALBUMIN SERPL-MCNC: 2.8 G/DL (ref 3.5–5.2)
ALBUMIN/GLOB SERPL: 1 G/DL
ALP SERPL-CCNC: 136 U/L (ref 39–117)
ALT SERPL W P-5'-P-CCNC: 14 U/L (ref 1–41)
ANION GAP SERPL CALCULATED.3IONS-SCNC: 8.9 MMOL/L (ref 5–15)
ANISOCYTOSIS BLD QL: NORMAL
AST SERPL-CCNC: 25 U/L (ref 1–40)
BASOPHILS # BLD AUTO: 0.04 10*3/MM3 (ref 0–0.2)
BASOPHILS NFR BLD AUTO: 0.9 % (ref 0–1.5)
BILIRUB SERPL-MCNC: 1.3 MG/DL (ref 0–1.2)
BUN SERPL-MCNC: 6 MG/DL (ref 8–23)
BUN/CREAT SERPL: 13.6 (ref 7–25)
CALCIUM SPEC-SCNC: 7.8 MG/DL (ref 8.6–10.5)
CHLORIDE SERPL-SCNC: 105 MMOL/L (ref 98–107)
CO2 SERPL-SCNC: 25.1 MMOL/L (ref 22–29)
CREAT SERPL-MCNC: 0.44 MG/DL (ref 0.76–1.27)
DACRYOCYTES BLD QL SMEAR: NORMAL
DEPRECATED RDW RBC AUTO: 45.1 FL (ref 37–54)
EGFRCR SERPLBLD CKD-EPI 2021: 116.9 ML/MIN/1.73
EOSINOPHIL # BLD AUTO: 0.07 10*3/MM3 (ref 0–0.4)
EOSINOPHIL NFR BLD AUTO: 1.6 % (ref 0.3–6.2)
ERYTHROCYTE [DISTWIDTH] IN BLOOD BY AUTOMATED COUNT: 17.7 % (ref 12.3–15.4)
GLOBULIN UR ELPH-MCNC: 2.8 GM/DL
GLUCOSE SERPL-MCNC: 139 MG/DL (ref 65–99)
HCT VFR BLD AUTO: 33.4 % (ref 37.5–51)
HGB BLD-MCNC: 9.3 G/DL (ref 13–17.7)
IMM GRANULOCYTES # BLD AUTO: 0.01 10*3/MM3 (ref 0–0.05)
IMM GRANULOCYTES NFR BLD AUTO: 0.2 % (ref 0–0.5)
LYMPHOCYTES # BLD AUTO: 0.78 10*3/MM3 (ref 0.7–3.1)
LYMPHOCYTES NFR BLD AUTO: 18.4 % (ref 19.6–45.3)
MAGNESIUM SERPL-MCNC: 1.7 MG/DL (ref 1.6–2.4)
MCH RBC QN AUTO: 20 PG (ref 26.6–33)
MCHC RBC AUTO-ENTMCNC: 27.8 G/DL (ref 31.5–35.7)
MCV RBC AUTO: 72 FL (ref 79–97)
MONOCYTES # BLD AUTO: 0.42 10*3/MM3 (ref 0.1–0.9)
MONOCYTES NFR BLD AUTO: 9.9 % (ref 5–12)
NEUTROPHILS NFR BLD AUTO: 2.93 10*3/MM3 (ref 1.7–7)
NEUTROPHILS NFR BLD AUTO: 69 % (ref 42.7–76)
NRBC BLD AUTO-RTO: 0 /100 WBC (ref 0–0.2)
PLAT MORPH BLD: NORMAL
PLATELET # BLD AUTO: 116 10*3/MM3 (ref 140–450)
PMV BLD AUTO: ABNORMAL FL
POTASSIUM SERPL-SCNC: 3.8 MMOL/L (ref 3.5–5.2)
PROT SERPL-MCNC: 5.6 G/DL (ref 6–8.5)
RBC # BLD AUTO: 4.64 10*6/MM3 (ref 4.14–5.8)
SODIUM SERPL-SCNC: 139 MMOL/L (ref 136–145)
WBC MORPH BLD: NORMAL
WBC NRBC COR # BLD AUTO: 4.25 10*3/MM3 (ref 3.4–10.8)

## 2024-12-07 PROCEDURE — 80053 COMPREHEN METABOLIC PANEL: CPT | Performed by: EMERGENCY MEDICINE

## 2024-12-07 PROCEDURE — 36415 COLL VENOUS BLD VENIPUNCTURE: CPT

## 2024-12-07 PROCEDURE — 83735 ASSAY OF MAGNESIUM: CPT | Performed by: EMERGENCY MEDICINE

## 2024-12-07 PROCEDURE — 85007 BL SMEAR W/DIFF WBC COUNT: CPT | Performed by: EMERGENCY MEDICINE

## 2024-12-07 PROCEDURE — 85025 COMPLETE CBC W/AUTO DIFF WBC: CPT | Performed by: EMERGENCY MEDICINE

## 2024-12-07 NOTE — ED PROVIDER NOTES
Time: 1:02 AM EST  Date of encounter:  12/6/2024  Independent Historian/Clinical History and Information was obtained by:   Patient    History is limited by: N/A    Chief Complaint: Diarrhea      History of Present Illness:  Patient is a 64 y.o. year old male who presents to the emergency department for evaluation of diarrhea.  The patient reports that he had diarrhea at home and came to the emergency department to get cleaned up.  Patient denies chest pain and shortness of breath.  Patient does report some abdominal cramping.      Patient Care Team  Primary Care Provider: Provider, No Known    Past Medical History:     Allergies   Allergen Reactions    Adhesive Tape Rash     Past Medical History:   Diagnosis Date    Absence of toe of right foot     Acute osteomyelitis of left calcaneus  08/18/2021    Anxiety and depression     Arthritis     Cancer     Chronic pain     STATES HIS PAIN IS 10/10 AAT    Claustrophobia     Corns and callus     Diabetic ulcer of left heel associated with type 2 DM 08/18/2021    Diabetic ulcer of left heel associated with type 2 DM 07/06/2021    Diabetic ulcer of right midfoot associated with type 2 DM 08/18/2021    Difficulty walking     Essential hypertension 08/31/2021    Hammertoe     Hyperlipidemia LDL goal <100 08/31/2021    Ingrown toenail     Obesity     Paroxysmal atrial fibrillation 08/31/2021    Polyneuropathy     Pressure ulcer, stage 1     Tinea unguium     Type 2 diabetes mellitus with polyneuropathy      Past Surgical History:   Procedure Laterality Date    CYST REMOVAL      center of back; benign    EYE SURGERY      INCISION AND DRAINAGE ABSCESS      back    INCISION AND DRAINAGE LEG Right 12/10/2021    Procedure: INCISION AND DRAINAGE LOWER EXTREMITY;  Surgeon: Ash Leyva DPM;  Location: [facility];  Service: Podiatry;  Laterality: Right;    OTHER SURGICAL HISTORY      Surgical clips left foot    TOE SURGERY Right     Removal of 5th toe    TRANS  METATARSAL AMPUTATION Right 12/02/2021    Procedure: AMPUTATION TRANS METATARSAL;  Surgeon: Ash Leyva DPM;  Location: [facility] MAIN OR;  Service: Podiatry;  Laterality: Right;    VASCULAR SURGERY      WRIST SURGERY Left     repair of injury     Family History   Problem Relation Age of Onset    Hearing loss Mother     Depression Mother     Arthritis Mother     Heart disease Mother     Heart disease Father     Cancer Father         Unspecified    Cancer Sister     Hearing loss Brother     Heart disease Brother     Diabetes Paternal Grandmother     Learning disabilities Nephew     Drug abuse Nephew     COPD Nephew     Alcohol abuse Nephew        Home Medications:  Prior to Admission medications    Medication Sig Start Date End Date Taking? Authorizing Provider   acetaminophen (TYLENOL) 650 MG 8 hr tablet Take 1 tablet by mouth Every 8 (Eight) Hours As Needed for Mild Pain.    Provider, MD Laura   apixaban (ELIQUIS) 5 MG tablet tablet Take 1 tablet by mouth Every 12 (Twelve) Hours. 1/31/23   Delia Cervantes APRN   baclofen (LIORESAL) 10 MG tablet Take 1 tablet by mouth 3 (Three) Times a Day As Needed for Muscle Spasms. 10/24/24   Wendy Zee DO   digoxin (LANOXIN) 125 MCG tablet Take 1 tablet by mouth Daily for 30 days. 6/15/23 9/10/23  Joe Haynes MD   empagliflozin (JARDIANCE) 10 MG tablet tablet Take 1 tablet by mouth Daily. 10/24/24   Wendy Zee DO   insulin detemir (Levemir FlexPen) 100 UNIT/ML injection Inject 40 Units under the skin into the appropriate area as directed 2 (Two) Times a Day for 30 days.  Patient taking differently: Inject 60 Units under the skin into the appropriate area as directed 2 (Two) Times a Day. 6/23/23 9/10/23  Cailin Acosta MD   Insulin Lispro, 1 Unit Dial, (HumaLOG KwikPen) 100 UNIT/ML solution pen-injector Inject subcutaneously 3 times a day with meals. Use sliding scale. Max daily dose 45units Blood Glucose 150-199- 3units  200-249- 5units 250-299- 8 units 300-349- 10units 350-400- 12units >400- 14units & Call Provider 10/23/24   Wendy Zee DO   metoprolol succinate XL (TOPROL-XL) 25 MG 24 hr tablet Take 0.5 tablets by mouth Daily. 10/24/24   Wendy Zee DO   pregabalin (LYRICA) 25 MG capsule Take 1 capsule by mouth 3 (Three) Times a Day for 3 days. 6/15/23 9/10/23  Joe Haynes MD   sertraline (ZOLOFT) 50 MG tablet Take 1 tablet by mouth Every Night. 10/23/24   Wendy Zee DO   simvastatin (ZOCOR) 20 MG tablet Take 1 tablet by mouth Every Night. 23   Delia Cervantes APRN        Social History:   Social History     Tobacco Use    Smoking status: Former     Current packs/day: 0.00     Types: Cigarettes     Quit date: 1990     Years since quittin.2    Smokeless tobacco: Never    Tobacco comments:     quit at age 30   Vaping Use    Vaping status: Never Used   Substance Use Topics    Alcohol use: Not Currently     Comment: rare    Drug use: Not Currently     Types: Marijuana     Comment: occasionally, EVERY 3-4 MONTH         Review of Systems:  Review of Systems   Constitutional:  Negative for chills and fever.   HENT:  Negative for congestion, rhinorrhea and sore throat.    Eyes:  Negative for pain and visual disturbance.   Respiratory:  Negative for apnea, cough, chest tightness and shortness of breath.    Cardiovascular:  Negative for chest pain and palpitations.   Gastrointestinal:  Positive for diarrhea. Negative for abdominal pain, nausea and vomiting.   Genitourinary:  Negative for difficulty urinating and dysuria.   Musculoskeletal:  Negative for joint swelling and myalgias.   Skin:  Negative for color change.   Neurological:  Negative for seizures and headaches.   Psychiatric/Behavioral: Negative.     All other systems reviewed and are negative.       Physical Exam:  /55 (BP Location: Left arm, Patient Position: Lying)   Pulse 89   Temp 98.2 °F (36.8 °C) (Oral)   " Resp 18   Ht 188 cm (74\")   Wt (!) 177 kg (389 lb 12.4 oz)   SpO2 99%   BMI 50.04 kg/m²     Physical Exam  Vitals and nursing note reviewed.   Constitutional:       General: He is not in acute distress.     Appearance: Normal appearance. He is not toxic-appearing.   HENT:      Head: Normocephalic and atraumatic.      Jaw: There is normal jaw occlusion.   Eyes:      General: Lids are normal.      Extraocular Movements: Extraocular movements intact.      Conjunctiva/sclera: Conjunctivae normal.      Pupils: Pupils are equal, round, and reactive to light.   Cardiovascular:      Rate and Rhythm: Normal rate and regular rhythm.      Pulses: Normal pulses.      Heart sounds: Normal heart sounds.   Pulmonary:      Effort: Pulmonary effort is normal. No respiratory distress.      Breath sounds: Normal breath sounds. No wheezing or rhonchi.   Abdominal:      General: Abdomen is flat.      Palpations: Abdomen is soft.      Tenderness: There is no abdominal tenderness. There is no guarding or rebound.   Musculoskeletal:         General: Normal range of motion.      Cervical back: Normal range of motion and neck supple.      Right lower leg: No edema.      Left lower leg: No edema.   Skin:     General: Skin is warm and dry.   Neurological:      Mental Status: He is alert and oriented to person, place, and time. Mental status is at baseline.   Psychiatric:         Mood and Affect: Mood normal.                  Procedures:  Procedures      Medical Decision Making:      Comorbidities that affect care:    History of C. difficile    External Notes reviewed:    Patient was last seen in the emergency department for similar symptoms.      The following orders were placed and all results were independently analyzed by me:  Orders Placed This Encounter   Procedures    Comprehensive Metabolic Panel    Magnesium    CBC Auto Differential    Scan Slide    CBC & Differential       Medications Given in the Emergency " Department:  Medications - No data to display     ED Course:         Labs:    Lab Results (last 24 hours)       Procedure Component Value Units Date/Time    CBC & Differential [[id number]]  (Abnormal) Collected: 12/07/24 0011    Specimen: Blood Updated: 12/07/24 0052    Narrative:      The following orders were created for panel order CBC & Differential.  Procedure                               Abnormality         Status                     ---------                               -----------         ------                     CBC Auto Differential[[id number]]        Abnormal            Final result               Scan Slide[[id number]]                                       In process                   Please view results for these tests on the individual orders.    Comprehensive Metabolic Panel [[id number]]  (Abnormal) Collected: 12/07/24 0011    Specimen: Blood Updated: 12/07/24 0050     Glucose 139 mg/dL      BUN 6 mg/dL      Creatinine 0.44 mg/dL      Sodium 139 mmol/L      Potassium 3.8 mmol/L      Chloride 105 mmol/L      CO2 25.1 mmol/L      Calcium 7.8 mg/dL      Total Protein 5.6 g/dL      Albumin 2.8 g/dL      ALT (SGPT) 14 U/L      AST (SGOT) 25 U/L      Alkaline Phosphatase 136 U/L      Total Bilirubin 1.3 mg/dL      Globulin 2.8 gm/dL      A/G Ratio 1.0 g/dL      BUN/Creatinine Ratio 13.6     Anion Gap 8.9 mmol/L      eGFR 116.9 mL/min/1.73     Narrative:      GFR Normal >60  Chronic Kidney Disease <60  Kidney Failure <15      Magnesium [[id number]]  (Normal) Collected: 12/07/24 0011    Specimen: Blood Updated: 12/07/24 0050     Magnesium 1.7 mg/dL     CBC Auto Differential [[id number]]  (Abnormal) Collected: 12/07/24 0011    Specimen: Blood Updated: 12/07/24 0052     WBC 4.25 10*3/mm3      RBC 4.64 10*6/mm3      Hemoglobin 9.3 g/dL      Hematocrit 33.4 %      MCV 72.0 fL      MCH 20.0 pg      MCHC 27.8 g/dL      RDW 17.7 %      RDW-SD 45.1 fl      MPV --     Comment: Test unavailable due to sample interference         Platelets 116 10*3/mm3      Neutrophil % 69.0 %      Lymphocyte % 18.4 %      Monocyte % 9.9 %      Eosinophil % 1.6 %      Basophil % 0.9 %      Immature Grans % 0.2 %      Neutrophils, Absolute 2.93 10*3/mm3      Lymphocytes, Absolute 0.78 10*3/mm3      Monocytes, Absolute 0.42 10*3/mm3      Eosinophils, Absolute 0.07 10*3/mm3      Basophils, Absolute 0.04 10*3/mm3      Immature Grans, Absolute 0.01 10*3/mm3      nRBC 0.0 /100 WBC     Scan Slide [[id number]] Collected: 12/07/24 0011    Specimen: Blood Updated: 12/07/24 0017             Imaging:    No Radiology Exams Resulted Within Past 24 Hours      Differential Diagnosis and Discussion:    Diarrhea: Differential diagnosis includes but is not limited to malabsorption syndrome, bacterial infection, carcinoid syndrome, pancreatic hypersecretion, viral infection, celiac sprue, Crohn's disease, ulcerative colitis, ischemic colitis, colitis, hypermotility, and irritable bowel syndrome.    All labs were reviewed and interpreted by me.    MDM     The patient´s CBC that was reviewed and interpreted by me shows no abnormalities of critical concern. Of note, there is no anemia requiring a blood transfusion and the platelet count is acceptable.  The patient´s CMP that was reviewed and interpretted by me shows no abnormalities of critical concern. Of note, the patient´s sodium and potassium are acceptable. The patient´s liver enzymes are unremarkable. The patient´s renal function (creatinine) is preserved. The patient has a normal anion gap.  Patient has no signs of kidney failure and sepsis.                Patient Care Considerations:    CT ABDOMEN AND PELVIS: I considered ordering a CT scan of the abdomen and pelvis however abdomen is soft and nontender.      Consultants/Shared Management Plan:    None    Social Determinants of Health:    Patient lives by himself and does get evaluated by care tenders and a home health nurse.      Disposition and Care  Coordination:    Discharged: I considered escalation of care by admitting this patient to the hospital, however blood work is unremarkable.    I have explained the patient´s condition, diagnoses and treatment plan based on the information available to me at this time. I have answered questions and addressed any concerns. The patient has a good  understanding of the patient´s diagnosis, condition, and treatment plan as can be expected at this point. The vital signs have been stable. The patient´s condition is stable and appropriate for discharge from the emergency department.      The patient will pursue further outpatient evaluation with the primary care physician or other designated or consulting physician as outlined in the discharge instructions. They are agreeable to this plan of care and follow-up instructions have been explained in detail. The patient has received these instructions in written format and has expressed an understanding of the discharge instructions. The patient is aware that any significant change in condition or worsening of symptoms should prompt an immediate return to this or the closest emergency department or call to 911.  I have explained discharge medications and the need for follow up with the patient/caretakers. This was also printed in the discharge instructions. Patient was discharged with the following medications and follow up:      Medication List        Changed      Levemir FlexPen 100 UNIT/ML injection  Generic drug: insulin detemir  Inject 40 Units under the skin into the appropriate area as directed 2 (Two) Times a Day for 30 days.  What changed: how much to take           Provider, No Known  [facility]  [place] [address] [id number]    In 2 days         Final diagnoses:   Diarrhea, unspecified type        ED Disposition       ED Disposition   Discharge    Condition   Stable    Comment   --               This medical record created using voice recognition  software.             Brown Vasquez MD  12/07/24 0107

## 2024-12-10 ENCOUNTER — HOSPITAL ENCOUNTER (EMERGENCY)
Facility: HOSPITAL | Age: 64
Discharge: HOME | End: 2024-12-10
Attending: EMERGENCY MEDICINE | Admitting: EMERGENCY MEDICINE
Payer: MEDICARE

## 2024-12-10 ENCOUNTER — APPOINTMENT (OUTPATIENT)
Dept: GENERAL RADIOLOGY | Facility: HOSPITAL | Age: 64
End: 2024-12-10
Payer: MEDICARE

## 2024-12-10 VITALS
RESPIRATION RATE: 14 BRPM | DIASTOLIC BLOOD PRESSURE: 82 MMHG | OXYGEN SATURATION: 100 % | HEIGHT: 73 IN | SYSTOLIC BLOOD PRESSURE: 124 MMHG | TEMPERATURE: 97.9 F | BODY MASS INDEX: 51.42 KG/M2 | HEART RATE: 90 BPM

## 2024-12-10 DIAGNOSIS — R19.7 DIARRHEA, UNSPECIFIED TYPE: Primary | ICD-10-CM

## 2024-12-10 LAB
ALBUMIN SERPL-MCNC: 2.7 G/DL (ref 3.5–5.2)
ALBUMIN/GLOB SERPL: 1 G/DL
ALP SERPL-CCNC: 136 U/L (ref 39–117)
ALT SERPL W P-5'-P-CCNC: 18 U/L (ref 1–41)
ANION GAP SERPL CALCULATED.3IONS-SCNC: 9.8 MMOL/L (ref 5–15)
ANISOCYTOSIS BLD QL: NORMAL
AST SERPL-CCNC: 29 U/L (ref 1–40)
BASOPHILS # BLD AUTO: 0.04 10*3/MM3 (ref 0–0.2)
BASOPHILS NFR BLD AUTO: 0.7 % (ref 0–1.5)
BILIRUB SERPL-MCNC: 1 MG/DL (ref 0–1.2)
BILIRUB UR QL STRIP: NEGATIVE
BUN SERPL-MCNC: 6 MG/DL (ref 8–23)
BUN/CREAT SERPL: 12.8 (ref 7–25)
BURR CELLS BLD QL SMEAR: NORMAL
CALCIUM SPEC-SCNC: 7.8 MG/DL (ref 8.6–10.5)
CHLORIDE SERPL-SCNC: 102 MMOL/L (ref 98–107)
CLARITY UR: CLEAR
CO2 SERPL-SCNC: 24.2 MMOL/L (ref 22–29)
COLOR UR: YELLOW
CREAT SERPL-MCNC: 0.47 MG/DL (ref 0.76–1.27)
D-LACTATE SERPL-SCNC: 2.1 MMOL/L (ref 0.5–2)
D-LACTATE SERPL-SCNC: 4.4 MMOL/L (ref 0.5–2)
DACRYOCYTES BLD QL SMEAR: NORMAL
DEPRECATED RDW RBC AUTO: 44.7 FL (ref 37–54)
DIGOXIN SERPL-MCNC: 0.32 NG/ML (ref 0.6–1.2)
EGFRCR SERPLBLD CKD-EPI 2021: 114.6 ML/MIN/1.73
EOSINOPHIL # BLD AUTO: 0.15 10*3/MM3 (ref 0–0.4)
EOSINOPHIL NFR BLD AUTO: 2.5 % (ref 0.3–6.2)
ERYTHROCYTE [DISTWIDTH] IN BLOOD BY AUTOMATED COUNT: 18 % (ref 12.3–15.4)
GLOBULIN UR ELPH-MCNC: 2.7 GM/DL
GLUCOSE SERPL-MCNC: 229 MG/DL (ref 65–99)
GLUCOSE UR STRIP-MCNC: ABNORMAL MG/DL
HCT VFR BLD AUTO: 32.9 % (ref 37.5–51)
HGB BLD-MCNC: 9.4 G/DL (ref 13–17.7)
HGB UR QL STRIP.AUTO: NEGATIVE
HOLD SPECIMEN: NORMAL
HOLD SPECIMEN: NORMAL
HYPOCHROMIA BLD QL: NORMAL
IMM GRANULOCYTES # BLD AUTO: 0.02 10*3/MM3 (ref 0–0.05)
IMM GRANULOCYTES NFR BLD AUTO: 0.3 % (ref 0–0.5)
KETONES UR QL STRIP: NEGATIVE
LEUKOCYTE ESTERASE UR QL STRIP.AUTO: NEGATIVE
LYMPHOCYTES # BLD AUTO: 1 10*3/MM3 (ref 0.7–3.1)
LYMPHOCYTES NFR BLD AUTO: 16.9 % (ref 19.6–45.3)
MAGNESIUM SERPL-MCNC: 1.7 MG/DL (ref 1.6–2.4)
MCH RBC QN AUTO: 20.2 PG (ref 26.6–33)
MCHC RBC AUTO-ENTMCNC: 28.6 G/DL (ref 31.5–35.7)
MCV RBC AUTO: 70.8 FL (ref 79–97)
MICROCYTES BLD QL: NORMAL
MONOCYTES # BLD AUTO: 0.69 10*3/MM3 (ref 0.1–0.9)
MONOCYTES NFR BLD AUTO: 11.6 % (ref 5–12)
NEUTROPHILS NFR BLD AUTO: 4.03 10*3/MM3 (ref 1.7–7)
NEUTROPHILS NFR BLD AUTO: 68 % (ref 42.7–76)
NITRITE UR QL STRIP: NEGATIVE
NRBC BLD AUTO-RTO: 0 /100 WBC (ref 0–0.2)
NT-PROBNP SERPL-MCNC: 277.2 PG/ML (ref 0–900)
OVALOCYTES BLD QL SMEAR: NORMAL
PH UR STRIP.AUTO: 6 [PH] (ref 5–8)
PLAT MORPH BLD: NORMAL
PLATELET # BLD AUTO: 129 10*3/MM3 (ref 140–450)
PMV BLD AUTO: ABNORMAL FL
POIKILOCYTOSIS BLD QL SMEAR: NORMAL
POTASSIUM SERPL-SCNC: 3.5 MMOL/L (ref 3.5–5.2)
PROT SERPL-MCNC: 5.4 G/DL (ref 6–8.5)
PROT UR QL STRIP: NEGATIVE
QT INTERVAL: 373 MS
QTC INTERVAL: 451 MS
RBC # BLD AUTO: 4.65 10*6/MM3 (ref 4.14–5.8)
SODIUM SERPL-SCNC: 136 MMOL/L (ref 136–145)
SP GR UR STRIP: 1.02 (ref 1–1.03)
TARGETS BLD QL SMEAR: NORMAL
TROPONIN T SERPL HS-MCNC: 13 NG/L
UROBILINOGEN UR QL STRIP: ABNORMAL
WBC MORPH BLD: NORMAL
WBC NRBC COR # BLD AUTO: 5.93 10*3/MM3 (ref 3.4–10.8)
WHOLE BLOOD HOLD COAG: NORMAL
WHOLE BLOOD HOLD SPECIMEN: NORMAL

## 2024-12-10 PROCEDURE — 80162 ASSAY OF DIGOXIN TOTAL: CPT | Performed by: EMERGENCY MEDICINE

## 2024-12-10 PROCEDURE — 36415 COLL VENOUS BLD VENIPUNCTURE: CPT

## 2024-12-10 PROCEDURE — 99284 EMERGENCY DEPT VISIT MOD MDM: CPT

## 2024-12-10 PROCEDURE — 84484 ASSAY OF TROPONIN QUANT: CPT | Performed by: EMERGENCY MEDICINE

## 2024-12-10 PROCEDURE — 80053 COMPREHEN METABOLIC PANEL: CPT | Performed by: EMERGENCY MEDICINE

## 2024-12-10 PROCEDURE — 71045 X-RAY EXAM CHEST 1 VIEW: CPT

## 2024-12-10 PROCEDURE — 25810000003 SODIUM CHLORIDE 0.9 % SOLUTION: Performed by: EMERGENCY MEDICINE

## 2024-12-10 PROCEDURE — 93005 ELECTROCARDIOGRAM TRACING: CPT | Performed by: EMERGENCY MEDICINE

## 2024-12-10 PROCEDURE — 81003 URINALYSIS AUTO W/O SCOPE: CPT | Performed by: EMERGENCY MEDICINE

## 2024-12-10 PROCEDURE — 87040 BLOOD CULTURE FOR BACTERIA: CPT | Performed by: EMERGENCY MEDICINE

## 2024-12-10 PROCEDURE — 83735 ASSAY OF MAGNESIUM: CPT | Performed by: EMERGENCY MEDICINE

## 2024-12-10 PROCEDURE — 85007 BL SMEAR W/DIFF WBC COUNT: CPT | Performed by: EMERGENCY MEDICINE

## 2024-12-10 PROCEDURE — 83880 ASSAY OF NATRIURETIC PEPTIDE: CPT | Performed by: EMERGENCY MEDICINE

## 2024-12-10 PROCEDURE — 83605 ASSAY OF LACTIC ACID: CPT | Performed by: EMERGENCY MEDICINE

## 2024-12-10 PROCEDURE — 85025 COMPLETE CBC W/AUTO DIFF WBC: CPT | Performed by: EMERGENCY MEDICINE

## 2024-12-10 RX ORDER — ACETAMINOPHEN 325 MG/1
975 TABLET ORAL ONCE
Status: DISCONTINUED | OUTPATIENT
Start: 2024-12-10 | End: 2024-12-10 | Stop reason: HOSPADM

## 2024-12-10 RX ORDER — SODIUM CHLORIDE 0.9 % (FLUSH) 0.9 %
10 SYRINGE (ML) INJECTION AS NEEDED
Status: DISCONTINUED | OUTPATIENT
Start: 2024-12-10 | End: 2024-12-10 | Stop reason: HOSPADM

## 2024-12-10 RX ADMIN — SODIUM CHLORIDE 500 ML: 9 INJECTION, SOLUTION INTRAVENOUS at 02:37

## 2024-12-10 NOTE — ED PROVIDER NOTES
Time: 1:35 AM EST  Date of encounter:  12/10/2024  Independent Historian/Clinical History and Information was obtained by:   Patient    History is limited by: N/A    Chief Complaint: diarrhea      History of Present Illness:  Patient is a 64 y.o. year old male who presents to the emergency department for evaluation of Diarrhea.  Patient states he has had multiple episodes diarrhea since yesterday.  He states he is unable to get around by himself and is unable to take care of himself.  He denies nausea vomiting.  No abdominal pain.  Patient states he is basically bedbound but lives alone.  He has someone from a local [employer] bring him food and water.  He also reports worsening edema.      Patient Care Team  Primary Care Provider: Provider, No Known    Past Medical History:     Allergies   Allergen Reactions    Adhesive Tape Rash     Past Medical History:   Diagnosis Date    Absence of toe of right foot     Acute osteomyelitis of left calcaneus  08/18/2021    Anxiety and depression     Arthritis     Cancer     Chronic pain     STATES HIS PAIN IS 10/10 AAT    Claustrophobia     Corns and callus     Diabetic ulcer of left heel associated with type 2 DM 08/18/2021    Diabetic ulcer of left heel associated with type 2 DM 07/06/2021    Diabetic ulcer of right midfoot associated with type 2 DM 08/18/2021    Difficulty walking     Essential hypertension 08/31/2021    Hammertoe     Hyperlipidemia LDL goal <100 08/31/2021    Ingrown toenail     Obesity     Paroxysmal atrial fibrillation 08/31/2021    Polyneuropathy     Pressure ulcer, stage 1     Tinea unguium     Type 2 diabetes mellitus with polyneuropathy      Past Surgical History:   Procedure Laterality Date    CYST REMOVAL      center of back; benign    EYE SURGERY      INCISION AND DRAINAGE ABSCESS      back    INCISION AND DRAINAGE LEG Right 12/10/2021    Procedure: INCISION AND DRAINAGE LOWER EXTREMITY;  Surgeon: Ash Leyva DPM;  Location: [facility] OR;   Service: Podiatry;  Laterality: Right;    OTHER SURGICAL HISTORY      Surgical clips left foot    TOE SURGERY Right     Removal of 5th toe    TRANS METATARSAL AMPUTATION Right 12/02/2021    Procedure: AMPUTATION TRANS METATARSAL;  Surgeon: Ash Leyva DPM;  Location: [facility] MAIN OR;  Service: Podiatry;  Laterality: Right;    VASCULAR SURGERY      WRIST SURGERY Left     repair of injury     Family History   Problem Relation Age of Onset    Hearing loss Mother     Depression Mother     Arthritis Mother     Heart disease Mother     Heart disease Father     Cancer Father         Unspecified    Cancer Sister     Hearing loss Brother     Heart disease Brother     Diabetes Paternal Grandmother     Learning disabilities Nephew     Drug abuse Nephew     COPD Nephew     Alcohol abuse Nephew        Home Medications:  Prior to Admission medications    Medication Sig Start Date End Date Taking? Authorizing Provider   acetaminophen (TYLENOL) 650 MG 8 hr tablet Take 1 tablet by mouth Every 8 (Eight) Hours As Needed for Mild Pain.    Provider, MD Laura   apixaban (ELIQUIS) 5 MG tablet tablet Take 1 tablet by mouth Every 12 (Twelve) Hours. 1/31/23   Delia Cervantes APRN   baclofen (LIORESAL) 10 MG tablet Take 1 tablet by mouth 3 (Three) Times a Day As Needed for Muscle Spasms. 10/24/24   Wendy Zee DO   digoxin (LANOXIN) 125 MCG tablet Take 1 tablet by mouth Daily for 30 days. 6/15/23 9/10/23  Joe Haynes MD   empagliflozin (JARDIANCE) 10 MG tablet tablet Take 1 tablet by mouth Daily. 10/24/24   Wendy Zee DO   insulin detemir (Levemir FlexPen) 100 UNIT/ML injection Inject 40 Units under the skin into the appropriate area as directed 2 (Two) Times a Day for 30 days.  Patient taking differently: Inject 60 Units under the skin into the appropriate area as directed 2 (Two) Times a Day. 6/23/23 9/10/23  Cailin Acosta MD   Insulin Lispro, 1 Unit Dial, (HumaLOG KwikPen) 100 UNIT/ML  solution pen-injector Inject subcutaneously 3 times a day with meals. Use sliding scale. Max daily dose 45units Blood Glucose 150-199- 3units 200-249- 5units 250-299- 8 units 300-349- 10units 350-400- 12units >400- 14units & Call Provider 10/23/24   Wendy Zee DO   metoprolol succinate XL (TOPROL-XL) 25 MG 24 hr tablet Take 0.5 tablets by mouth Daily. 10/24/24   Wendy Zee DO   pregabalin (LYRICA) 25 MG capsule Take 1 capsule by mouth 3 (Three) Times a Day for 3 days. 6/15/23 9/10/23  Joe Haynes MD   sertraline (ZOLOFT) 50 MG tablet Take 1 tablet by mouth Every Night. 10/23/24   Wendy Zee DO   simvastatin (ZOCOR) 20 MG tablet Take 1 tablet by mouth Every Night. 23   Delia Cervantes APRN        Social History:   Social History     Tobacco Use    Smoking status: Former     Current packs/day: 0.00     Types: Cigarettes     Quit date: 1990     Years since quittin.3    Smokeless tobacco: Never    Tobacco comments:     quit at age 30   Vaping Use    Vaping status: Never Used   Substance Use Topics    Alcohol use: Not Currently     Comment: rare    Drug use: Not Currently     Types: Marijuana     Comment: occasionally, EVERY 3-4 MONTH         Review of Systems:  Review of Systems   Constitutional:  Negative for chills and fever.   HENT:  Negative for congestion, ear pain and sore throat.    Eyes:  Negative for pain.   Respiratory:  Negative for cough, chest tightness and shortness of breath.    Cardiovascular:  Negative for chest pain.   Gastrointestinal:  Positive for diarrhea. Negative for abdominal pain, nausea and vomiting.   Genitourinary:  Negative for flank pain and hematuria.   Musculoskeletal:  Negative for joint swelling.   Skin:  Negative for pallor.   Neurological:  Positive for weakness. Negative for seizures and headaches.   All other systems reviewed and are negative.       Physical Exam:  /82 (Patient Position: Lying)   Pulse 93    "Temp 97.9 °F (36.6 °C) (Oral)   Resp 14   Ht 185.4 cm (73\")   SpO2 100%   BMI 51.42 kg/m²     Physical Exam  Constitutional:       Appearance: He is obese.   HENT:      Head: Normocephalic.      Mouth/Throat:      Mouth: Mucous membranes are moist.   Eyes:      Extraocular Movements: Extraocular movements intact.      Pupils: Pupils are equal, round, and reactive to light.   Cardiovascular:      Rate and Rhythm: Normal rate and regular rhythm.   Pulmonary:      Effort: Pulmonary effort is normal.   Abdominal:      General: There is no distension.   Musculoskeletal:         General: Normal range of motion.      Cervical back: Normal range of motion.      Right lower leg: Edema present.      Left lower leg: Edema present.   Skin:     General: Skin is warm.      Capillary Refill: Capillary refill takes less than 2 seconds.      Comments: Skin breakdown around buttocks and scrotum   Neurological:      General: No focal deficit present.                  Procedures:  Procedures      Medical Decision Making:      Comorbidities that affect care:    Cancer, Diabetes, Obesity    External Notes reviewed:    Previous Clinic Note: Patient seen orthopedic surgery on 8/30/2023 for left hip pain, obesity.      The following orders were placed and all results were independently analyzed by me:  Orders Placed This Encounter   Procedures    Blood Culture - Blood,    Blood Culture - Blood,    XR Chest 1 View    [place] Draw    Comprehensive Metabolic Panel    Single High Sensitivity Troponin T    Magnesium    Urinalysis With Microscopic If Indicated (No Culture) - Urine, Clean Catch    CBC Auto Differential    Lactic Acid, Plasma    Scan Slide    Digoxin Level    STAT Lactic Acid, Reflex    BNP    STAT Lactic Acid, Reflex    NPO Diet NPO Type: Strict NPO    Undress & Gown    Continuous Pulse Oximetry    Vital Signs    Orthostatic Blood Pressure    Inpatient Hospitalist Consult    Oxygen Therapy- Nasal Cannula; Titrate 1-6 LPM Per " SpO2; 90 - 95%    POC Glucose Once    ECG 12 Lead ED Triage Standing Order; Weak / Dizzy / AMS    Insert Peripheral IV    Fall Precautions    CBC & Differential    Green Top (Gel)    Lavender Top    Gold Top - SST    Light Blue Top       Medications Given in the Emergency Department:  Medications   sodium chloride 0.9 % flush 10 mL (has no administration in time range)   acetaminophen (TYLENOL) tablet 975 mg (975 mg Oral Not Given 12/10/24 0534)   sodium chloride 0.9 % bolus 500 mL (0 mL Intravenous Stopped 12/10/24 0511)        ED Course:    ED Course as of 12/10/24 0607   Tue Dec 10, 2024   0151 ECG 12 Lead ED Triage Standing Order; Weak / Dizzy / AMS  Sinus rhythm with rate of 88.  Normal IN and QTc.  Premature atrial complexes present.  Nonspecific T wave abnormalities.  No acute ST elevation.  EKG interpreted by me. [LD]      ED Course User Index  [LD] Deedee Manning MD       Labs:    Lab Results (last 24 hours)       Procedure Component Value Units Date/Time    CBC & Differential [[id number]]  (Abnormal) Collected: 12/10/24 0048    Specimen: Blood Updated: 12/10/24 0126    Narrative:      The following orders were created for panel order CBC & Differential.  Procedure                               Abnormality         Status                     ---------                               -----------         ------                     CBC Auto Differential[[id number]]        Abnormal            Final result               Scan Slide[[id number]]                                       Final result                 Please view results for these tests on the individual orders.    Comprehensive Metabolic Panel [[id number]]  (Abnormal) Collected: 12/10/24 0048    Specimen: Blood Updated: 12/10/24 0119     Glucose 229 mg/dL      BUN 6 mg/dL      Creatinine 0.47 mg/dL      Sodium 136 mmol/L      Potassium 3.5 mmol/L      Chloride 102 mmol/L      CO2 24.2 mmol/L      Calcium 7.8 mg/dL      Total Protein 5.4 g/dL      Albumin  2.7 g/dL      ALT (SGPT) 18 U/L      AST (SGOT) 29 U/L      Alkaline Phosphatase 136 U/L      Total Bilirubin 1.0 mg/dL      Globulin 2.7 gm/dL      A/G Ratio 1.0 g/dL      BUN/Creatinine Ratio 12.8     Anion Gap 9.8 mmol/L      eGFR 114.6 mL/min/1.73     Narrative:      GFR Normal >60  Chronic Kidney Disease <60  Kidney Failure <15      Single High Sensitivity Troponin T [[id number]]  (Normal) Collected: 12/10/24 0048    Specimen: Blood Updated: 12/10/24 0119     HS Troponin T 13 ng/L     Narrative:      High Sensitive Troponin T Reference Range:  <14.0 ng/L- Negative Female for AMI  <22.0 ng/L- Negative Male for AMI  >=14 - Abnormal Female indicating possible myocardial injury.  >=22 - Abnormal Male indicating possible myocardial injury.   Clinicians would have to utilize clinical acumen, EKG, Troponin, and serial changes to determine if it is an Acute Myocardial Infarction or myocardial injury due to an underlying chronic condition.         Magnesium [[id number]]  (Normal) Collected: 12/10/24 0048    Specimen: Blood Updated: 12/10/24 0119     Magnesium 1.7 mg/dL     CBC Auto Differential [[id number]]  (Abnormal) Collected: 12/10/24 0048    Specimen: Blood Updated: 12/10/24 0126     WBC 5.93 10*3/mm3      RBC 4.65 10*6/mm3      Hemoglobin 9.4 g/dL      Hematocrit 32.9 %      MCV 70.8 fL      MCH 20.2 pg      MCHC 28.6 g/dL      RDW 18.0 %      RDW-SD 44.7 fl      MPV --     Comment: Unable to result due to sample interference.          Platelets 129 10*3/mm3      Neutrophil % 68.0 %      Lymphocyte % 16.9 %      Monocyte % 11.6 %      Eosinophil % 2.5 %      Basophil % 0.7 %      Immature Grans % 0.3 %      Neutrophils, Absolute 4.03 10*3/mm3      Lymphocytes, Absolute 1.00 10*3/mm3      Monocytes, Absolute 0.69 10*3/mm3      Eosinophils, Absolute 0.15 10*3/mm3      Basophils, Absolute 0.04 10*3/mm3      Immature Grans, Absolute 0.02 10*3/mm3      nRBC 0.0 /100 WBC     Blood Culture - Blood, Arm, Right [[id number]]  Collected: 12/10/24 0048    Specimen: Blood from Arm, Right Updated: 12/10/24 0058    Blood Culture - Blood, Arm, Left [[id number]] Collected: 12/10/24 0048    Specimen: Blood from Arm, Left Updated: 12/10/24 0058    Lactic Acid, Plasma [[id number]]  (Abnormal) Collected: 12/10/24 0048    Specimen: Blood Updated: 12/10/24 0146     Lactate 4.4 mmol/L     Scan Slide [[id number]] Collected: 12/10/24 0048    Specimen: Blood Updated: 12/10/24 0126     Anisocytosis Slight/1+     Crenated RBC's Slight/1+     Dacrocytes Slight/1+     Hypochromia Mod/2+     Microcytes Mod/2+     Ovalocytes Slight/1+     Poikilocytes Slight/1+     Target Cells Slight/1+     WBC Morphology Normal     Platelet Morphology Normal    Digoxin Level [[id number]]  (Abnormal) Collected: 12/10/24 0048    Specimen: Blood Updated: 12/10/24 0155     Digoxin 0.32 ng/mL     BNP [[id number]]  (Normal) Collected: 12/10/24 0048    Specimen: Blood Updated: 12/10/24 0215     proBNP 277.2 pg/mL     Narrative:      This assay is used as an aid in the diagnosis of individuals suspected of having heart failure. It can be used as an aid in the diagnosis of acute decompensated heart failure (ADHF) in patients presenting with signs and symptoms of ADHF to the emergency department (ED). In addition, NT-proBNP of <300 pg/mL indicates ADHF is not likely.    Age Range Result Interpretation  NT-proBNP Concentration (pg/mL:      <50             Positive            >450                   Gray                 300-450                    Negative             <300    50-75           Positive            >900                  Gray                300-900                  Negative            <300      >75             Positive            >1800                  Gray                300-1800                  Negative            <300    STAT Lactic Acid, Reflex [[id number]]  (Abnormal) Collected: 12/10/24 0511    Specimen: Blood Updated: 12/10/24 0545     Lactate 2.1 mmol/L     Urinalysis With  Microscopic If Indicated (No Culture) - Urine, Clean Catch [[id number]]  (Abnormal) Collected: 12/10/24 0512    Specimen: Urine, Clean Catch Updated: 12/10/24 0530     Color, UA Yellow     Appearance, UA Clear     pH, UA 6.0     Specific Gravity, UA 1.018     Glucose, UA >=1000 mg/dL (3+)     Ketones, UA Negative     Bilirubin, UA Negative     Blood, UA Negative     Protein, UA Negative     Leuk Esterase, UA Negative     Nitrite, UA Negative     Urobilinogen, UA 1.0 E.U./dL    Narrative:      Urine microscopic not indicated.             Imaging:    XR Chest 1 View    Result Date: 12/10/2024  XR CHEST 1 VW Date of Exam: 12/10/2024 12:52 AM EST Indication: Weak/Dizzy/AMS triage protocol Comparison: 11/19/2024 Findings: Cardiac and mediastinal contours are normal. Pulmonary vascularity is normal. The lungs are clear and no pneumothorax.     No active disease. Electronically Signed: Los Harvey MD  12/10/2024 1:03 AM EST  Workstation ID: [id number]       Differential Diagnosis and Discussion:    Diarrhea: Differential diagnosis includes but is not limited to malabsorption syndrome, bacterial infection, carcinoid syndrome, pancreatic hypersecretion, viral infection, celiac sprue, Crohn's disease, ulcerative colitis, ischemic colitis, colitis, hypermotility, and irritable bowel syndrome.    All labs were reviewed and interpreted by me.  All X-rays impressions were independently interpreted by me.  EKG was interpreted by me.    MDM  Number of Diagnoses or Management Options  Diagnosis management comments: Patient present emergency department with multiple episodes of diarrhea.  Patient is morbidly obese and bedbound.  He states unable to take care of himself.  On arrival patient is covered in stool.  He has skin breakdown around his buttocks. Recommend   Patient was cleaned in the emergency department.  Initial lactic acid 4.4.  Repeat down to 2.1.  Per chart reviewed patient appears to be at baseline.  Patient requested  pain medication when offered Tylenol he states he does not want to be in the hospital.  He wants to be discharged.  Patient will be discharged home.       Amount and/or Complexity of Data Reviewed  Clinical lab tests: reviewed  Tests in the radiology section of CPT®: reviewed  Decide to obtain previous medical records or to obtain history from someone other than the patient: yes  Review and summarize past medical records: yes  Independent visualization of images, tracings, or specimens: yes    Risk of Complications, Morbidity, and/or Mortality  Presenting problems: moderate  Management options: moderate                       Patient Care Considerations:    SEPSIS was considered but is NOT present in the emergency department as SIRS criteria is not present.      Consultants/Shared Management Plan:    None    Social Determinants of Health:    Patient is not able to adequately take care off himself but is refusing admission and requesting to be discharged home.  Patient is alert and oriented and able to make his own decisions.      Disposition and Care Coordination:    Discharged: I considered escalation of care by admitting this patient to the hospital, however but patient wants to go home    I have explained the patient´s condition, diagnoses and treatment plan based on the information available to me at this time. I have answered questions and addressed any concerns. The patient has a good  understanding of the patient´s diagnosis, condition, and treatment plan as can be expected at this point. The vital signs have been stable. The patient´s condition is stable and appropriate for discharge from the emergency department.      The patient will pursue further outpatient evaluation with the primary care physician or other designated or consulting physician as outlined in the discharge instructions. They are agreeable to this plan of care and follow-up instructions have been explained in detail. The patient has received  these instructions in written format and has expressed an understanding of the discharge instructions. The patient is aware that any significant change in condition or worsening of symptoms should prompt an immediate return to this or the closest emergency department or call to 911.  I have explained discharge medications and the need for follow up with the patient/caretakers. This was also printed in the discharge instructions. Patient was discharged with the following medications and follow up:      Medication List        Changed      Levemir FlexPen 100 UNIT/ML injection  Generic drug: insulin detemir  Inject 40 Units under the skin into the appropriate area as directed 2 (Two) Times a Day for 30 days.  What changed: how much to take           Provider, No Known  [facility]  [place] [address]    Schedule an appointment as soon as possible for a visit          Final diagnoses:   Diarrhea, unspecified type        ED Disposition       ED Disposition   Discharge    Condition   Stable    Comment   --               This medical record created using voice recognition software.             Deedee Manning MD  12/10/24 0697

## 2024-12-10 NOTE — ED NOTES
"Patient requested pain medication. This rn went to give patient meds and he refused the tylenol stating that \"that's not a fucking pain medication, I could go home and get something better and I would rather do that. That doctor needs to come in her and explain wtf is going on.\" This nurse  clarified the patients statement \"So you would rather have nothing then at least try the tylenol.\" Patient answered yes. MD notified.   "

## 2024-12-12 ENCOUNTER — HOSPITAL ENCOUNTER (EMERGENCY)
Facility: HOSPITAL | Age: 64
Discharge: HOME | End: 2024-12-12
Attending: EMERGENCY MEDICINE
Payer: MEDICARE

## 2024-12-12 VITALS
TEMPERATURE: 98.4 F | BODY MASS INDEX: 41.75 KG/M2 | RESPIRATION RATE: 18 BRPM | OXYGEN SATURATION: 99 % | HEIGHT: 73 IN | DIASTOLIC BLOOD PRESSURE: 87 MMHG | HEART RATE: 88 BPM | SYSTOLIC BLOOD PRESSURE: 147 MMHG | WEIGHT: 315 LBS

## 2024-12-12 DIAGNOSIS — R19.7 DIARRHEA, UNSPECIFIED TYPE: ICD-10-CM

## 2024-12-12 DIAGNOSIS — R53.1 GENERALIZED WEAKNESS: Primary | ICD-10-CM

## 2024-12-12 PROCEDURE — 99283 EMERGENCY DEPT VISIT LOW MDM: CPT

## 2024-12-12 NOTE — ED NOTES
"Pt reports he had diarrhea at home and isn't able to care for himself. He came to the ED wanting someone to \"clean him up\" from the diarrhea episode and would \"like a couple cups of crushed ice\". No further complaints.   "

## 2024-12-12 NOTE — ED PROVIDER NOTES
Time: 7:54 AM EST  Date of encounter:  12/12/2024  Independent Historian/Clinical History and Information was obtained by:   Patient, Nursing Staff, and EMS    History is limited by: N/A    Chief Complaint: Needs to be cleaned up      History of Present Illness:  Patient is a 64 y.o. year old male who presents to the emergency department for evaluation of needing to be cleaned up.  Patient is very well-known to this facility and presents by EMS from home requesting staff to clean him up.  He reports being unable to care for himself at home.      Patient Care Team  Primary Care Provider: Provider, No Known    Past Medical History:     Allergies   Allergen Reactions    Adhesive Tape Rash     Past Medical History:   Diagnosis Date    Absence of toe of right foot     Acute osteomyelitis of left calcaneus  08/18/2021    Anxiety and depression     Arthritis     Cancer     Chronic pain     STATES HIS PAIN IS 10/10 AAT    Claustrophobia     Corns and callus     Diabetic ulcer of left heel associated with type 2 DM 08/18/2021    Diabetic ulcer of left heel associated with type 2 DM 07/06/2021    Diabetic ulcer of right midfoot associated with type 2 DM 08/18/2021    Difficulty walking     Essential hypertension 08/31/2021    Hammertoe     Hyperlipidemia LDL goal <100 08/31/2021    Ingrown toenail     Obesity     Paroxysmal atrial fibrillation 08/31/2021    Polyneuropathy     Pressure ulcer, stage 1     Tinea unguium     Type 2 diabetes mellitus with polyneuropathy      Past Surgical History:   Procedure Laterality Date    CYST REMOVAL      center of back; benign    EYE SURGERY      INCISION AND DRAINAGE ABSCESS      back    INCISION AND DRAINAGE LEG Right 12/10/2021    Procedure: INCISION AND DRAINAGE LOWER EXTREMITY;  Surgeon: Ash Leyva DPM;  Location: [facility] MAIN OR;  Service: Podiatry;  Laterality: Right;    OTHER SURGICAL HISTORY      Surgical clips left foot    TOE SURGERY Right     Removal of 5th toe     TRANS METATARSAL AMPUTATION Right 12/02/2021    Procedure: AMPUTATION TRANS METATARSAL;  Surgeon: Ash Leyva DPM;  Location: [facility] MAIN OR;  Service: Podiatry;  Laterality: Right;    VASCULAR SURGERY      WRIST SURGERY Left     repair of injury     Family History   Problem Relation Age of Onset    Hearing loss Mother     Depression Mother     Arthritis Mother     Heart disease Mother     Heart disease Father     Cancer Father         Unspecified    Cancer Sister     Hearing loss Brother     Heart disease Brother     Diabetes Paternal Grandmother     Learning disabilities Nephew     Drug abuse Nephew     COPD Nephew     Alcohol abuse Nephew        Home Medications:  Prior to Admission medications    Medication Sig Start Date End Date Taking? Authorizing Provider   acetaminophen (TYLENOL) 650 MG 8 hr tablet Take 1 tablet by mouth Every 8 (Eight) Hours As Needed for Mild Pain.    Provider, MD Laura   apixaban (ELIQUIS) 5 MG tablet tablet Take 1 tablet by mouth Every 12 (Twelve) Hours. 1/31/23   Delia Cervantes APRN   baclofen (LIORESAL) 10 MG tablet Take 1 tablet by mouth 3 (Three) Times a Day As Needed for Muscle Spasms. 10/24/24   Wendy Zee DO   digoxin (LANOXIN) 125 MCG tablet Take 1 tablet by mouth Daily for 30 days. 6/15/23 9/10/23  Joe Haynes MD   empagliflozin (JARDIANCE) 10 MG tablet tablet Take 1 tablet by mouth Daily. 10/24/24   Wendy Zee DO   insulin detemir (Levemir FlexPen) 100 UNIT/ML injection Inject 40 Units under the skin into the appropriate area as directed 2 (Two) Times a Day for 30 days.  Patient taking differently: Inject 60 Units under the skin into the appropriate area as directed 2 (Two) Times a Day. 6/23/23 9/10/23  Cailin Acosta MD   Insulin Lispro, 1 Unit Dial, (HumaLOG KwikPen) 100 UNIT/ML solution pen-injector Inject subcutaneously 3 times a day with meals. Use sliding scale. Max daily dose 45units Blood Glucose 150-199- 3units  "200-249- 5units 250-299- 8 units 300-349- 10units 350-400- 12units >400- 14units & Call Provider 10/23/24   Wendy Zee DO   metoprolol succinate XL (TOPROL-XL) 25 MG 24 hr tablet Take 0.5 tablets by mouth Daily. 10/24/24   Wendy Zee DO   pregabalin (LYRICA) 25 MG capsule Take 1 capsule by mouth 3 (Three) Times a Day for 3 days. 6/15/23 9/10/23  Joe Haynes MD   sertraline (ZOLOFT) 50 MG tablet Take 1 tablet by mouth Every Night. 10/23/24   Wnedy Zee DO   simvastatin (ZOCOR) 20 MG tablet Take 1 tablet by mouth Every Night. 23   Delia Cervantes APRN        Social History:   Social History     Tobacco Use    Smoking status: Former     Current packs/day: 0.00     Types: Cigarettes     Quit date: 1990     Years since quittin.3    Smokeless tobacco: Never    Tobacco comments:     quit at age 30   Vaping Use    Vaping status: Never Used   Substance Use Topics    Alcohol use: Not Currently     Comment: rare    Drug use: Not Currently     Types: Marijuana     Comment: occasionally, EVERY 3-4 MONTH         Review of Systems:  Review of Systems   Constitutional:  Negative for chills and fever.   HENT:  Negative for congestion, rhinorrhea and sore throat.    Eyes:  Negative for pain and visual disturbance.   Respiratory:  Negative for apnea, cough, chest tightness and shortness of breath.    Cardiovascular:  Negative for chest pain and palpitations.   Gastrointestinal:  Positive for diarrhea. Negative for abdominal pain, nausea and vomiting.   Genitourinary:  Negative for difficulty urinating and dysuria.   Musculoskeletal:  Negative for joint swelling and myalgias.   Skin:  Negative for color change.   Neurological:  Negative for seizures and headaches.   Psychiatric/Behavioral: Negative.     All other systems reviewed and are negative.       Physical Exam:  Pulse 90   Temp 98.2 °F (36.8 °C) (Oral)   Resp 18   Ht 185.4 cm (73\")   Wt (!) 175 kg (384 lb 14.8 " oz)   SpO2 98%   BMI 50.78 kg/m²     Physical Exam  Vitals and nursing note reviewed.   Constitutional:       General: He is not in acute distress.     Appearance: Normal appearance. He is obese. He is not toxic-appearing.   HENT:      Head: Normocephalic and atraumatic.      Jaw: There is normal jaw occlusion.   Eyes:      General: Lids are normal.      Extraocular Movements: Extraocular movements intact.      Conjunctiva/sclera: Conjunctivae normal.      Pupils: Pupils are equal, round, and reactive to light.   Cardiovascular:      Rate and Rhythm: Normal rate and regular rhythm.      Pulses: Normal pulses.      Heart sounds: Normal heart sounds.   Pulmonary:      Effort: Pulmonary effort is normal. No respiratory distress.      Breath sounds: Normal breath sounds. No wheezing or rhonchi.   Abdominal:      General: Abdomen is flat.      Palpations: Abdomen is soft.      Tenderness: There is no abdominal tenderness. There is no guarding or rebound.   Musculoskeletal:         General: Normal range of motion.      Cervical back: Normal range of motion and neck supple.      Right lower leg: No edema.      Left lower leg: No edema.   Skin:     General: Skin is warm and dry.   Neurological:      Mental Status: He is alert and oriented to person, place, and time. Mental status is at baseline.   Psychiatric:         Mood and Affect: Mood normal.                    Medical Decision Making:      Comorbidities that affect care:    Morbid obesity    External Notes reviewed:    Hospital Discharge Summary: After very long admission for generalized weakness      The following orders were placed and all results were independently analyzed by me:  No orders of the defined types were placed in this encounter.      Medications Given in the Emergency Department:  Medications - No data to display     ED Course:         Labs:    Lab Results (last 24 hours)       ** No results found for the last 24 hours. **             Imaging:    No  Radiology Exams Resulted Within Past 24 Hours      Differential Diagnosis and Discussion:    Weakness: Based on the patient's history, signs, and symptoms, the diffential diagnosis includes but is not limited to meningitis, stroke, sepsis, subarachnoid hemorrhage, intracranial bleeding, encephalitis, acute uti, dehydration, MS, myasthenia gravis, Guillan Sharps, migraine variant, neuromuscular disorders vertigo, electrolyte imbalance, and metabolic disorders.    PROCEDURES:        No orders to display       Procedures    MDM  Number of Diagnoses or Management Options  Diarrhea, unspecified type  Generalized weakness  Diagnosis management comments: In summary this is a 64-year-old male patient, well-known to this facility, who presents for evaluation.  He requests for us to clean him up.  He is otherwise appearing to be at his baseline.  Very strict return to ER and follow-up instructions have been provided to the patient.                         Patient Care Considerations:    LABS: I considered ordering labs, however no signs of metabolic derangement currently      Consultants/Shared Management Plan:    None    Social Determinants of Health:    Patient is well-known to this facility and will be discharged home at social work  advisement.      Disposition and Care Coordination:    Discharged: The patient is suitable and stable for discharge with no need for consideration of admission.    I have explained the patient´s condition, diagnoses and treatment plan based on the information available to me at this time. I have answered questions and addressed any concerns. The patient has a good  understanding of the patient´s diagnosis, condition, and treatment plan as can be expected at this point. The vital signs have been stable. The patient´s condition is stable and appropriate for discharge from the emergency department.      The patient will pursue further outpatient evaluation with the primary care physician or other  designated or consulting physician as outlined in the discharge instructions. They are agreeable to this plan of care and follow-up instructions have been explained in detail. The patient has received these instructions in written format and has expressed an understanding of the discharge instructions. The patient is aware that any significant change in condition or worsening of symptoms should prompt an immediate return to this or the closest emergency department or call to 911.  I have explained discharge medications and the need for follow up with the patient/caretakers. This was also printed in the discharge instructions. Patient was discharged with the following medications and follow up:      Medication List        Changed      Levemir FlexPen 100 UNIT/ML injection  Generic drug: insulin detemir  Inject 40 Units under the skin into the appropriate area as directed 2 (Two) Times a Day for 30 days.  What changed: how much to take           Provider, No Known  [facility] [place] [address]    In 1 week         Final diagnoses:   Generalized weakness   Diarrhea, unspecified type        ED Disposition       ED Disposition   Discharge    Condition   Stable    Comment   --               This medical record created using voice recognition software.             Sergio De La Paz MD  12/12/24 [id number]

## 2024-12-15 ENCOUNTER — HOSPITAL ENCOUNTER (EMERGENCY)
Facility: HOSPITAL | Age: 64
Discharge: HOME | DRG: 291 | End: 2024-12-16
Attending: EMERGENCY MEDICINE | Admitting: EMERGENCY MEDICINE
Payer: MEDICARE

## 2024-12-15 ENCOUNTER — APPOINTMENT (OUTPATIENT)
Dept: CT IMAGING | Facility: HOSPITAL | Age: 64
DRG: 291 | End: 2024-12-15
Payer: MEDICARE

## 2024-12-15 DIAGNOSIS — R60.1 ANASARCA: ICD-10-CM

## 2024-12-15 DIAGNOSIS — R18.8 CIRRHOSIS OF LIVER WITH ASCITES, UNSPECIFIED HEPATIC CIRRHOSIS TYPE: ICD-10-CM

## 2024-12-15 DIAGNOSIS — R10.9 ABDOMINAL PAIN, UNSPECIFIED ABDOMINAL LOCATION: Primary | ICD-10-CM

## 2024-12-15 DIAGNOSIS — K74.60 CIRRHOSIS OF LIVER WITH ASCITES, UNSPECIFIED HEPATIC CIRRHOSIS TYPE: ICD-10-CM

## 2024-12-15 DIAGNOSIS — K59.00 CONSTIPATION, UNSPECIFIED CONSTIPATION TYPE: ICD-10-CM

## 2024-12-15 DIAGNOSIS — R53.81 PHYSICAL DECONDITIONING: ICD-10-CM

## 2024-12-15 LAB
ALBUMIN SERPL-MCNC: 2.5 G/DL (ref 3.5–5.2)
ALBUMIN/GLOB SERPL: 0.9 G/DL
ALP SERPL-CCNC: 126 U/L (ref 39–117)
ALT SERPL W P-5'-P-CCNC: 14 U/L (ref 1–41)
ANION GAP SERPL CALCULATED.3IONS-SCNC: 6.7 MMOL/L (ref 5–15)
ANISOCYTOSIS BLD QL: NORMAL
AST SERPL-CCNC: 24 U/L (ref 1–40)
BACTERIA SPEC AEROBE CULT: NORMAL
BACTERIA SPEC AEROBE CULT: NORMAL
BASOPHILS # BLD AUTO: 0.03 10*3/MM3 (ref 0–0.2)
BASOPHILS NFR BLD AUTO: 0.6 % (ref 0–1.5)
BILIRUB SERPL-MCNC: 0.8 MG/DL (ref 0–1.2)
BILIRUB UR QL STRIP: ABNORMAL
BUN SERPL-MCNC: 8 MG/DL (ref 8–23)
BUN/CREAT SERPL: 25 (ref 7–25)
CALCIUM SPEC-SCNC: 7.9 MG/DL (ref 8.6–10.5)
CHLORIDE SERPL-SCNC: 106 MMOL/L (ref 98–107)
CLARITY UR: ABNORMAL
CO2 SERPL-SCNC: 24.3 MMOL/L (ref 22–29)
COLOR UR: ABNORMAL
CREAT SERPL-MCNC: 0.32 MG/DL (ref 0.76–1.27)
D-LACTATE SERPL-SCNC: 1.4 MMOL/L (ref 0.5–2)
DEPRECATED RDW RBC AUTO: 47 FL (ref 37–54)
DIGOXIN SERPL-MCNC: <0.3 NG/ML (ref 0.6–1.2)
EGFRCR SERPLBLD CKD-EPI 2021: 128.7 ML/MIN/1.73
EOSINOPHIL # BLD AUTO: 0.15 10*3/MM3 (ref 0–0.4)
EOSINOPHIL NFR BLD AUTO: 3.2 % (ref 0.3–6.2)
ERYTHROCYTE [DISTWIDTH] IN BLOOD BY AUTOMATED COUNT: 18.6 % (ref 12.3–15.4)
GLOBULIN UR ELPH-MCNC: 2.8 GM/DL
GLUCOSE SERPL-MCNC: 199 MG/DL (ref 65–99)
GLUCOSE UR STRIP-MCNC: ABNORMAL MG/DL
HCT VFR BLD AUTO: 29.9 % (ref 37.5–51)
HGB BLD-MCNC: 8.3 G/DL (ref 13–17.7)
HGB UR QL STRIP.AUTO: NEGATIVE
HOLD SPECIMEN: NORMAL
HOLD SPECIMEN: NORMAL
HYPOCHROMIA BLD QL: NORMAL
IMM GRANULOCYTES # BLD AUTO: 0.02 10*3/MM3 (ref 0–0.05)
IMM GRANULOCYTES NFR BLD AUTO: 0.4 % (ref 0–0.5)
KETONES UR QL STRIP: NEGATIVE
LEUKOCYTE ESTERASE UR QL STRIP.AUTO: NEGATIVE
LIPASE SERPL-CCNC: 23 U/L (ref 13–60)
LYMPHOCYTES # BLD AUTO: 0.72 10*3/MM3 (ref 0.7–3.1)
LYMPHOCYTES NFR BLD AUTO: 15.2 % (ref 19.6–45.3)
MCH RBC QN AUTO: 19.7 PG (ref 26.6–33)
MCHC RBC AUTO-ENTMCNC: 27.8 G/DL (ref 31.5–35.7)
MCV RBC AUTO: 70.9 FL (ref 79–97)
MICROCYTES BLD QL: NORMAL
MONOCYTES # BLD AUTO: 0.52 10*3/MM3 (ref 0.1–0.9)
MONOCYTES NFR BLD AUTO: 10.9 % (ref 5–12)
NEUTROPHILS NFR BLD AUTO: 3.31 10*3/MM3 (ref 1.7–7)
NEUTROPHILS NFR BLD AUTO: 69.7 % (ref 42.7–76)
NITRITE UR QL STRIP: NEGATIVE
NRBC BLD AUTO-RTO: 0 /100 WBC (ref 0–0.2)
OVALOCYTES BLD QL SMEAR: NORMAL
PH UR STRIP.AUTO: 5.5 [PH] (ref 5–8)
PLATELET # BLD AUTO: 125 10*3/MM3 (ref 140–450)
PMV BLD AUTO: ABNORMAL FL
POIKILOCYTOSIS BLD QL SMEAR: NORMAL
POTASSIUM SERPL-SCNC: 4 MMOL/L (ref 3.5–5.2)
PROT SERPL-MCNC: 5.3 G/DL (ref 6–8.5)
PROT UR QL STRIP: ABNORMAL
RBC # BLD AUTO: 4.22 10*6/MM3 (ref 4.14–5.8)
SMALL PLATELETS BLD QL SMEAR: NORMAL
SODIUM SERPL-SCNC: 137 MMOL/L (ref 136–145)
SP GR UR STRIP: >1.03 (ref 1–1.03)
UROBILINOGEN UR QL STRIP: ABNORMAL
WBC MORPH BLD: NORMAL
WBC NRBC COR # BLD AUTO: 4.75 10*3/MM3 (ref 3.4–10.8)
WHOLE BLOOD HOLD COAG: NORMAL
WHOLE BLOOD HOLD SPECIMEN: NORMAL

## 2024-12-15 PROCEDURE — 81003 URINALYSIS AUTO W/O SCOPE: CPT | Performed by: EMERGENCY MEDICINE

## 2024-12-15 PROCEDURE — 80053 COMPREHEN METABOLIC PANEL: CPT | Performed by: EMERGENCY MEDICINE

## 2024-12-15 PROCEDURE — 83605 ASSAY OF LACTIC ACID: CPT | Performed by: EMERGENCY MEDICINE

## 2024-12-15 PROCEDURE — 85007 BL SMEAR W/DIFF WBC COUNT: CPT | Performed by: EMERGENCY MEDICINE

## 2024-12-15 PROCEDURE — 25510000001 IOPAMIDOL PER 1 ML: Performed by: EMERGENCY MEDICINE

## 2024-12-15 PROCEDURE — 83690 ASSAY OF LIPASE: CPT | Performed by: EMERGENCY MEDICINE

## 2024-12-15 PROCEDURE — 99285 EMERGENCY DEPT VISIT HI MDM: CPT

## 2024-12-15 PROCEDURE — 85025 COMPLETE CBC W/AUTO DIFF WBC: CPT | Performed by: EMERGENCY MEDICINE

## 2024-12-15 PROCEDURE — 80162 ASSAY OF DIGOXIN TOTAL: CPT | Performed by: EMERGENCY MEDICINE

## 2024-12-15 PROCEDURE — 74177 CT ABD & PELVIS W/CONTRAST: CPT

## 2024-12-15 RX ORDER — NYSTATIN 100000 [USP'U]/G
POWDER TOPICAL ONCE
Status: COMPLETED | OUTPATIENT
Start: 2024-12-15 | End: 2024-12-15

## 2024-12-15 RX ORDER — SODIUM CHLORIDE 0.9 % (FLUSH) 0.9 %
10 SYRINGE (ML) INJECTION AS NEEDED
Status: DISCONTINUED | OUTPATIENT
Start: 2024-12-15 | End: 2024-12-16 | Stop reason: HOSPADM

## 2024-12-15 RX ORDER — IOPAMIDOL 755 MG/ML
100 INJECTION, SOLUTION INTRAVASCULAR
Status: COMPLETED | OUTPATIENT
Start: 2024-12-15 | End: 2024-12-15

## 2024-12-15 RX ADMIN — NYSTATIN: 100000 POWDER TOPICAL at 19:12

## 2024-12-15 RX ADMIN — IOPAMIDOL 100 ML: 755 INJECTION, SOLUTION INTRAVENOUS at 22:11

## 2024-12-16 VITALS
SYSTOLIC BLOOD PRESSURE: 101 MMHG | HEART RATE: 99 BPM | WEIGHT: 315 LBS | HEIGHT: 73 IN | OXYGEN SATURATION: 98 % | RESPIRATION RATE: 20 BRPM | DIASTOLIC BLOOD PRESSURE: 71 MMHG | BODY MASS INDEX: 41.75 KG/M2 | TEMPERATURE: 98.9 F

## 2024-12-16 PROCEDURE — 96374 THER/PROPH/DIAG INJ IV PUSH: CPT

## 2024-12-16 PROCEDURE — 25010000002 FUROSEMIDE PER 20 MG: Performed by: EMERGENCY MEDICINE

## 2024-12-16 RX ORDER — SPIRONOLACTONE 25 MG/1
25 TABLET ORAL 2 TIMES DAILY
Qty: 14 TABLET | Refills: 0 | Status: SHIPPED | OUTPATIENT
Start: 2024-12-16 | End: 2025-01-16 | Stop reason: HOSPADM

## 2024-12-16 RX ORDER — BISACODYL 5 MG/1
5 TABLET, DELAYED RELEASE ORAL DAILY PRN
Qty: 5 TABLET | Refills: 0 | Status: SHIPPED | OUTPATIENT
Start: 2024-12-16 | End: 2025-01-16 | Stop reason: HOSPADM

## 2024-12-16 RX ORDER — FUROSEMIDE 10 MG/ML
80 INJECTION INTRAMUSCULAR; INTRAVENOUS ONCE
Status: COMPLETED | OUTPATIENT
Start: 2024-12-16 | End: 2024-12-16

## 2024-12-16 RX ORDER — POLYETHYLENE GLYCOL 3350 17 G/17G
17 POWDER, FOR SOLUTION ORAL DAILY
Qty: 30 PACKET | Refills: 0 | Status: SHIPPED | OUTPATIENT
Start: 2024-12-16 | End: 2025-01-16 | Stop reason: HOSPADM

## 2024-12-16 RX ADMIN — FUROSEMIDE 80 MG: 10 INJECTION, SOLUTION INTRAMUSCULAR; INTRAVENOUS at 01:52

## 2024-12-16 NOTE — DISCHARGE INSTRUCTIONS
Please follow with your doctor in 48 hours to recheck your abdomen    Please use MiraLAX daily    Please review the CT scan performed today with your primary care physician.  Please discuss possible need for gastroenterology referral due to the cirrhosis and ascites seen on the CT scan.    Please return to emergency room immediately for intractable pain, intractable vomiting, fever, shortness of breath, altered mental status or any new symptoms you are concerned

## 2024-12-16 NOTE — ED PROVIDER NOTES
Time: 9:32 PM EST  Date of encounter:  12/15/2024  Independent Historian/Clinical History and Information was obtained by:   Patient  Chief Complaint: Diarrhea and to get cleaned up    History is limited by: N/A    History of Present Illness:  Patient is a 64 y.o. year old male who presents to the emergency department for evaluation of diarrhea and to get cleaned up.  The patient states that he is bedridden due to severe obesity and arthritis.  The patient initially states that he is here to get cleaned up.  Patient states that he does not have anyone to take care of him at his house.  Patient does note that he has been having diarrhea.  The patient states that he comes to the emergency room every several days to get a bath.  The patient states that he has been told by the hospital that he use not to be admitted for social reasons due noncompliance while in the hospital.  The patient states that he cannot go to a nursing home as he has been removed from most nursing homes.  He does note that he does have some left-sided abdominal pain.  He states that he is having 3-4 loose stools per day.  He denies any fever or rigors.  He denies any vomiting.  He does note that he has some swelling in his legs which is chronic and unchanged.  He also has some chronic wounds.    HPI    Patient Care Team  Primary Care Provider: Provider, No Known    Past Medical History:     Allergies   Allergen Reactions    Adhesive Tape Rash     Past Medical History:   Diagnosis Date    Absence of toe of right foot     Acute osteomyelitis of left calcaneus  08/18/2021    Anxiety and depression     Arthritis     Cancer     Chronic pain     STATES HIS PAIN IS 10/10 AAT    Claustrophobia     Corns and callus     Diabetic ulcer of left heel associated with type 2 DM 08/18/2021    Diabetic ulcer of left heel associated with type 2 DM 07/06/2021    Diabetic ulcer of right midfoot associated with type 2 DM 08/18/2021    Difficulty walking     Essential  hypertension 08/31/2021    Hammertoe     Hyperlipidemia LDL goal <100 08/31/2021    Ingrown toenail     Obesity     Paroxysmal atrial fibrillation 08/31/2021    Polyneuropathy     Pressure ulcer, stage 1     Tinea unguium     Type 2 diabetes mellitus with polyneuropathy      Past Surgical History:   Procedure Laterality Date    CYST REMOVAL      center of back; benign    EYE SURGERY      INCISION AND DRAINAGE ABSCESS      back    INCISION AND DRAINAGE LEG Right 12/10/2021    Procedure: INCISION AND DRAINAGE LOWER EXTREMITY;  Surgeon: Ash Leyva DPM;  Location: [facility] MAIN OR;  Service: Podiatry;  Laterality: Right;    OTHER SURGICAL HISTORY      Surgical clips left foot    TOE SURGERY Right     Removal of 5th toe    TRANS METATARSAL AMPUTATION Right 12/02/2021    Procedure: AMPUTATION TRANS METATARSAL;  Surgeon: Ash Leyva DPM;  Location: [facility] MAIN OR;  Service: Podiatry;  Laterality: Right;    VASCULAR SURGERY      WRIST SURGERY Left     repair of injury     Family History   Problem Relation Age of Onset    Hearing loss Mother     Depression Mother     Arthritis Mother     Heart disease Mother     Heart disease Father     Cancer Father         Unspecified    Cancer Sister     Hearing loss Brother     Heart disease Brother     Diabetes Paternal Grandmother     Learning disabilities Nephew     Drug abuse Nephew     COPD Nephew     Alcohol abuse Nephew        Home Medications:  Prior to Admission medications    Medication Sig Start Date End Date Taking? Authorizing Provider   acetaminophen (TYLENOL) 650 MG 8 hr tablet Take 1 tablet by mouth Every 8 (Eight) Hours As Needed for Mild Pain.    Provider, MD Laura   apixaban (ELIQUIS) 5 MG tablet tablet Take 1 tablet by mouth Every 12 (Twelve) Hours. 1/31/23   Delia Cervantes APRN   baclofen (LIORESAL) 10 MG tablet Take 1 tablet by mouth 3 (Three) Times a Day As Needed for Muscle Spasms. 10/24/24   Wendy Zee DO   digoxin  (LANOXIN) 125 MCG tablet Take 1 tablet by mouth Daily for 30 days. 6/15/23 9/10/23  Joe Haynes MD   empagliflozin (JARDIANCE) 10 MG tablet tablet Take 1 tablet by mouth Daily. 10/24/24   Wendy Zee DO   Insulin Lispro, 1 Unit Dial, (HumaLOG KwikPen) 100 UNIT/ML solution pen-injector Inject subcutaneously 3 times a day with meals. Use sliding scale. Max daily dose 45units Blood Glucose 150-199- 3units 200-249- 5units 250-299- 8 units 300-349- 10units 350-400- 12units >400- 14units & Call Provider 10/23/24   Wendy Zee DO   metoprolol succinate XL (TOPROL-XL) 25 MG 24 hr tablet Take 0.5 tablets by mouth Daily. 10/24/24   Wendy Zee DO   pregabalin (LYRICA) 25 MG capsule Take 1 capsule by mouth 3 (Three) Times a Day for 3 days. 6/15/23 9/10/23  Joe Haynes MD   sertraline (ZOLOFT) 50 MG tablet Take 1 tablet by mouth Every Night. 10/23/24   Wendy Zee DO   simvastatin (ZOCOR) 20 MG tablet Take 1 tablet by mouth Every Night. 23   Delia Cervantes APRN   insulin detemir (Levemir FlexPen) 100 UNIT/ML injection Inject 40 Units under the skin into the appropriate area as directed 2 (Two) Times a Day for 30 days.  Patient taking differently: Inject 60 Units under the skin into the appropriate area as directed 2 (Two) Times a Day. 6/23/23 12/15/24  aCilin Acosta MD        Social History:   Social History     Tobacco Use    Smoking status: Former     Current packs/day: 0.00     Types: Cigarettes     Quit date: 1990     Years since quittin.3    Smokeless tobacco: Never    Tobacco comments:     quit at age 30   Vaping Use    Vaping status: Never Used   Substance Use Topics    Alcohol use: Not Currently     Comment: rare    Drug use: Not Currently     Types: Marijuana     Comment: occasionally, EVERY 3-4 MONTH         Review of Systems:  Review of Systems   Constitutional:  Positive for fatigue. Negative for chills, diaphoresis and fever.  "  HENT:  Negative for congestion, postnasal drip, rhinorrhea and sore throat.    Eyes:  Negative for photophobia.   Respiratory:  Negative for cough, chest tightness and shortness of breath.    Cardiovascular:  Negative for chest pain, palpitations and leg swelling.   Gastrointestinal:  Positive for abdominal pain and diarrhea. Negative for nausea and vomiting.   Genitourinary:  Negative for difficulty urinating, dysuria, flank pain, frequency, hematuria and urgency.   Musculoskeletal:  Negative for neck pain and neck stiffness.   Skin:  Positive for wound. Negative for pallor and rash.   Neurological:  Negative for dizziness, syncope, weakness, numbness and headaches.   Hematological:  Negative for adenopathy. Does not bruise/bleed easily.   Psychiatric/Behavioral: Negative.          Physical Exam:  /71 (Patient Position: Lying)   Pulse 99   Temp 98.9 °F (37.2 °C) (Oral)   Resp 20   Ht 185.4 cm (73\")   Wt (!) 199 kg (438 lb 11.5 oz)   SpO2 98%   BMI 57.88 kg/m²     Physical Exam  Vitals and nursing note reviewed.   Constitutional:       General: He is not in acute distress.     Appearance: Normal appearance. He is obese. He is not ill-appearing, toxic-appearing or diaphoretic.   HENT:      Head: Normocephalic and atraumatic.      Mouth/Throat:      Mouth: Mucous membranes are moist.   Eyes:      Pupils: Pupils are equal, round, and reactive to light.   Cardiovascular:      Rate and Rhythm: Normal rate and regular rhythm.      Pulses: Normal pulses.           Carotid pulses are 2+ on the right side and 2+ on the left side.       Radial pulses are 2+ on the right side and 2+ on the left side.        Femoral pulses are 2+ on the right side and 2+ on the left side.       Popliteal pulses are 2+ on the right side and 2+ on the left side.        Dorsalis pedis pulses are 2+ on the right side and 2+ on the left side.        Posterior tibial pulses are 2+ on the right side and 2+ on the left side.      Heart " sounds: Normal heart sounds. No murmur heard.  Pulmonary:      Effort: Pulmonary effort is normal. No accessory muscle usage, respiratory distress or retractions.      Breath sounds: Examination of the right-upper field reveals decreased breath sounds. Examination of the left-upper field reveals decreased breath sounds. Examination of the right-middle field reveals decreased breath sounds. Examination of the left-middle field reveals decreased breath sounds. Examination of the right-lower field reveals decreased breath sounds. Examination of the left-lower field reveals decreased breath sounds. Decreased breath sounds present. No wheezing, rhonchi or rales.   Abdominal:      General: Abdomen is flat. There is no distension.      Palpations: Abdomen is soft. There is no mass or pulsatile mass.      Tenderness: There is abdominal tenderness in the left lower quadrant. There is no right CVA tenderness, left CVA tenderness, guarding or rebound.      Comments: No rigidity   Genitourinary:     Testes:         Right: Swelling present. Mass or tenderness not present.         Left: Swelling present. Mass or tenderness not present.   Musculoskeletal:         General: No swelling, tenderness or deformity.      Cervical back: Neck supple. No tenderness.      Right lower leg: Edema present.      Left lower leg: Edema present.      Comments: The patient has a right foot amputation.  The surgical site appears intact.  There is no dehiscence.  There is no crepitance or gas.  There is no discharge   Skin:     General: Skin is warm and dry.      Capillary Refill: Capillary refill takes less than 2 seconds.      Coloration: Skin is not jaundiced or pale.      Findings: Erythema and wound present.      Comments: Patient has changes consistent with venous stasis dermatitis on the legs.    The patient does appear to have a small ulceration on the left dorsal foot.  There is no erythema or discharge or odor    The patient appears to have  cutaneous candidiasis under the folds of the pannus.  There is no obvious overt cellulitis, abscess.  There is some excoriation there   Neurological:      General: No focal deficit present.      Mental Status: He is alert and oriented to person, place, and time. Mental status is at baseline.      Cranial Nerves: Cranial nerves 2-12 are intact. No cranial nerve deficit.      Sensory: Sensation is intact. No sensory deficit.      Motor: Weakness and pronator drift present.      Comments: The patient has symmetric weakness in both legs.   Psychiatric:         Attention and Perception: Attention normal.         Mood and Affect: Mood normal.         Speech: Speech normal.         Behavior: Behavior normal.         Cognition and Memory: Cognition and memory normal.                  Procedures:  Procedures      Medical Decision Making:      Comorbidities that affect care:    Chronic deconditioning and weakness, morbid obesity, hypertension, depression, paroxysmal atrial fibrillation, chronic arthritis and pain    External Notes reviewed:    None      The following orders were placed and all results were independently analyzed by me:  Orders Placed This Encounter   Procedures    Clostridioides difficile Toxin - Stool, Per Rectum    Enteric Parasite Panel - Stool, Per Rectum    Clostridioides difficile Toxin, PCR - Stool, Per Rectum    CT Abdomen Pelvis With Contrast    [place] Draw    Lipase    Urinalysis With Microscopic If Indicated (No Culture) - Urine, Clean Catch    Lactic Acid, Plasma    CBC Auto Differential    Scan Slide    Digoxin Level    Comprehensive Metabolic Panel    Undress & Gown    Undress & Gown    CBC & Differential    Green Top (Gel)    Lavender Top    Gold Top - SST    Light Blue Top       Medications Given in the Emergency Department:  Medications   nystatin (MYCOSTATIN) powder ( Topical Given 12/15/24 1912)   iopamidol (ISOVUE-370) 76 % injection 100 mL (100 mL Intravenous Given 12/15/24 2211)    furosemide (LASIX) injection 80 mg (80 mg Intravenous Given 12/16/24 0152)        ED Course:         Labs:    Lab Results (last 24 hours)       Procedure Component Value Units Date/Time    CBC & Differential [[id number]] Collected: 12/18/24 1634    Specimen: Blood Updated: 12/18/24 1640    Narrative:      The following orders were created for panel order CBC & Differential.  Procedure                               Abnormality         Status                     ---------                               -----------         ------                     CBC Auto Differential[[id number]]                            In process                 Scan Slide[[id number]]                                       In process                   Please view results for these tests on the individual orders.    Comprehensive Metabolic Panel [[id number]]  (Abnormal) Collected: 12/18/24 1634    Specimen: Blood Updated: 12/18/24 1700     Glucose 194 mg/dL      BUN 7 mg/dL      Creatinine 0.43 mg/dL      Sodium 133 mmol/L      Potassium 4.4 mmol/L      Chloride 102 mmol/L      CO2 26.0 mmol/L      Calcium 7.9 mg/dL      Total Protein 5.3 g/dL      Albumin 2.3 g/dL      ALT (SGPT) 12 U/L      AST (SGOT) 21 U/L      Alkaline Phosphatase 126 U/L      Total Bilirubin 1.5 mg/dL      Globulin 3.0 gm/dL      A/G Ratio 0.8 g/dL      BUN/Creatinine Ratio 16.3     Anion Gap 5.0 mmol/L      eGFR 117.7 mL/min/1.73     Narrative:      GFR Categories in Chronic Kidney Disease (CKD)      GFR Category          GFR (mL/min/1.73)    Interpretation  G1                     90 or greater         Normal or high (1)  G2                      60-89                Mild decrease (1)  G3a                   45-59                Mild to moderate decrease  G3b                   30-44                Moderate to severe decrease  G4                    15-29                Severe decrease  G5                    14 or less           Kidney failure          (1)In the absence of  evidence of kidney disease, neither GFR category G1 or G2 fulfill the criteria for CKD.    eGFR calculation 2021 CKD-EPI creatinine equation, which does not include race as a factor    Ethanol [[id number]] Collected: 12/18/24 1634    Specimen: Blood Updated: 12/18/24 1700     Ethanol <10 mg/dL      Ethanol % <0.010 %     Narrative:      Ethanol (Plasma)  <10 Essentially Negative    Toxic Concentrations           mg/dL    Flushing, slowing of reflexes    Impaired visual activity         Depression of CNS              >100  Possible Coma                  >300       Urine Drug Screen - Urine, Clean Catch [[id number]]  (Abnormal) Collected: 12/18/24 1634    Specimen: Urine, Clean Catch Updated: 12/18/24 1655     THC, Screen, Urine Positive     Phencyclidine (PCP), Urine Negative     Cocaine Screen, Urine Negative     Methamphetamine, Ur Negative     Opiate Screen Negative     Amphetamine Screen, Urine Negative     Benzodiazepine Screen, Urine Negative     Tricyclic Antidepressants Screen Negative     Methadone Screen, Urine Negative     Barbiturates Screen, Urine Negative     Oxycodone Screen, Urine Negative     Buprenorphine, Screen, Urine Negative    Narrative:      Cutoff For Drugs Screened:    Amphetamines               500 ng/ml  Barbiturates               200 ng/ml  Benzodiazepines            150 ng/ml  Cocaine                    150 ng/ml  Methadone                  200 ng/ml  Opiates                    100 ng/ml  Phencyclidine               25 ng/ml  THC                         50 ng/ml  Methamphetamine            500 ng/ml  Tricyclic Antidepressants  300 ng/ml  Oxycodone                  100 ng/ml  Buprenorphine               10 ng/ml    The normal value for all drugs tested is negative. This report includes unconfirmed screening results, with the cutoff values listed, to be used for medical treatment purposes only.  Unconfirmed results must not be used for non-medical purposes such as employment or  legal testing.  Clinical consideration should be applied to any drug of abuse test, particularly when unconfirmed results are used.      Acetaminophen Level [[id number]]  (Normal) Collected: 12/18/24 1634    Specimen: Blood Updated: 12/18/24 1700     Acetaminophen <5.0 mcg/mL     Salicylate Level [[id number]]  (Normal) Collected: 12/18/24 1634    Specimen: Blood Updated: 12/18/24 1700     Salicylate <0.3 mg/dL     TSH [[id number]]  (Normal) Collected: 12/18/24 1634    Specimen: Blood Updated: 12/18/24 1707     TSH 3.380 uIU/mL     T4, Free [[id number]]  (Normal) Collected: 12/18/24 1634    Specimen: Blood Updated: 12/18/24 1707     Free T4 1.10 ng/dL     CBC Auto Differential [[id number]] Collected: 12/18/24 1634    Specimen: Blood Updated: 12/18/24 1637    Fentanyl, Urine - Urine, Clean Catch [[id number]]  (Normal) Collected: 12/18/24 1634    Specimen: Urine, Clean Catch Updated: 12/18/24 1655     Fentanyl, Urine Negative    Narrative:      Negative Threshold:      Fentanyl 5 ng/mL     The normal value for the drug tested is negative. This report includes final unconfirmed screening results to be used for medical treatment purposes only. Unconfirmed results must not be used for non-medical purposes such as employment or legal testing. Clinical consideration should be applied to any drug of abuse test, particularly when unconfirmed results are used.           Scan Slide [[id number]] Collected: 12/18/24 1634    Specimen: Blood Updated: 12/18/24 1640             Imaging:    No Radiology Exams Resulted Within Past 24 Hours      Differential Diagnosis and Discussion:    Diarrhea: Differential diagnosis includes but is not limited to malabsorption syndrome, bacterial infection, carcinoid syndrome, pancreatic hypersecretion, viral infection, celiac sprue, Crohn's disease, ulcerative colitis, ischemic colitis, colitis, hypermotility, and irritable bowel syndrome.    Labs were drawn in the emergency department and all labs were  reviewed and interpreted by me.    MDM  Number of Diagnoses or Management Options  Abdominal pain, unspecified abdominal location  Anasarca  Cirrhosis of liver with ascites, unspecified hepatic cirrhosis type  Constipation, unspecified constipation type  Physical deconditioning  Diagnosis management comments: Patient vital signs are stable in emergency room.    The patient's CBC was reviewed and shows no abnormalities of critical concern.  Of note, there is no anemia requiring a blood transfusion and the platelet count is acceptable    The patient's CMP was reviewed and shows no abnormalities of critical concern.  Of note, the patient's sodium and potassium are acceptable.  The patient's liver enzymes are unremarkable.  The patient's renal function including creatinine is preserved.  The patient has a normal anion gap.  The patient does have low protein and albumin which may be consistent with chronic liver disease    The patient's urinalysis was negative for obvious infection or blood    The patient's digoxin level is undetectable    The patient has a normal lactate which makes severe sepsis unlikely and typically indicates normal tissue perfusion    Patient's lipase is 23 which makes acute pancreatitis unlikely    CT scan of the abdomen pelvis demonstrate cirrhosis and splenomegaly.  The patient has a large volume of ascites.  There is anasarca present.  The patient has an abnormal appearance of the left hip.  Most likely related to chronic osteonecrosis and severe osteoarthritis.  Gallstones are present without evidence of acute cholecystitis or pancreatitis.  There is fecal stasis with a large amount of stool burden consistent with constipation.    The patient had no diarrhea in the emerged department which was reported his chief complaint.    The patient is resting comfortably and feels better, is alert and in no distress.  Repeat examination is unremarkable and benign; in particular, there is no discomfort at  McBurney's point and there is no pulsatile mass.  The history, exam, diagnostic testing and current condition does not suggest acute appendicitis, bowel obstruction, acute cholecystitis bowel perforation, major gastrointestinal bleeding, severe diverticulitis, abdominal aortic aneurysm, mesenteric ischemia, volvulus, sepsis or other significant pathology that warrants further testing, continued ED treatment, admission for surgical evaluation at this point.  The vital signs have been stable.  The patient does not have uncontrolled pain intractable vomiting or other significant symptoms.  The patient's condition is stable and appropriate for discharge from the emergency department.  The patient will follow up with her primary care physician for serial reexamination of the abdomen tomorrow.  The patient was instructed to return should they have worsening pain, intractable vomiting, fever or new symptoms    I have discussed the CT findings with the patient.  The patient does understand there is ascites and cirrhosis present.  I will place the patient on spironolactone.  I will place the patient on laxative.  The patient will discuss the CT findings with his primary care physician discussed need for gastroenterology referral and possibly prolonged use of diuretics.      In       Amount and/or Complexity of Data Reviewed  Clinical lab tests: reviewed  Tests in the radiology section of CPT®: reviewed  Decide to obtain previous medical records or to obtain history from someone other than the patient: yes             Social Determinants of Health:    Patient is independent, reliable, and has access to care.       Disposition and Care Coordination:    Discharged: The patient is suitable and stable for discharge with no need for consideration of admission.    I have explained discharge medications and the need for follow up with the patient/caretakers. This was also printed in the discharge instructions. Patient was discharged  with the following medications and follow up:      Medication List        New Prescriptions      bisacodyl 5 MG EC tablet  Commonly known as: bisacodyl  Take 1 tablet by mouth Daily As Needed for Constipation for up to 5 days.     polyethylene glycol 17 g packet  Commonly known as: MIRALAX  Take 17 g by mouth Daily for 30 days.     spironolactone 25 MG tablet  Commonly known as: ALDACTONE  Take 1 tablet by mouth 2 (Two) Times a Day for 7 days.               Where to Get Your Medications        These medications were sent to [facility], [facility] - [place], [place] - [address] - [phone]  - [phone]   [address] [facility] [address]      Phone: [phone]   bisacodyl 5 MG EC tablet  polyethylene glycol 17 g packet  spironolactone 25 MG tablet      Provider, No Known  [facility]  [place] [address]    Schedule an appointment as soon as possible for a visit on 12/18/2024         Final diagnoses:   Abdominal pain, unspecified abdominal location   Physical deconditioning   Constipation, unspecified constipation type   Anasarca   Cirrhosis of liver with ascites, unspecified hepatic cirrhosis type        ED Disposition       ED Disposition   Discharge    Condition   Stable    Comment   --               This medical record created using voice recognition software.             Ha Schwab DO  12/18/24 6627

## 2024-12-18 ENCOUNTER — APPOINTMENT (OUTPATIENT)
Dept: GENERAL RADIOLOGY | Facility: HOSPITAL | Age: 64
DRG: 291 | End: 2024-12-18
Payer: MEDICARE

## 2024-12-18 ENCOUNTER — HOSPITAL ENCOUNTER (INPATIENT)
Facility: HOSPITAL | Age: 64
LOS: 29 days | Discharge: HOME HEALTH | DRG: 291 | End: 2025-01-16
Attending: EMERGENCY MEDICINE | Admitting: HOSPITALIST
Payer: MEDICARE

## 2024-12-18 DIAGNOSIS — I50.9 CONGESTIVE HEART FAILURE, UNSPECIFIED HF CHRONICITY, UNSPECIFIED HEART FAILURE TYPE: ICD-10-CM

## 2024-12-18 DIAGNOSIS — R45.851 SUICIDAL IDEATION: ICD-10-CM

## 2024-12-18 DIAGNOSIS — I50.33 ACUTE ON CHRONIC DIASTOLIC CONGESTIVE HEART FAILURE: ICD-10-CM

## 2024-12-18 DIAGNOSIS — E87.70 HYPERVOLEMIA, UNSPECIFIED HYPERVOLEMIA TYPE: Primary | ICD-10-CM

## 2024-12-18 LAB
ALBUMIN SERPL-MCNC: 2.3 G/DL (ref 3.5–5.2)
ALBUMIN/GLOB SERPL: 0.8 G/DL
ALP SERPL-CCNC: 126 U/L (ref 39–117)
ALT SERPL W P-5'-P-CCNC: 12 U/L (ref 1–41)
AMPHET+METHAMPHET UR QL: NEGATIVE
AMPHETAMINES UR QL: NEGATIVE
ANION GAP SERPL CALCULATED.3IONS-SCNC: 5 MMOL/L (ref 5–15)
ANISOCYTOSIS BLD QL: NORMAL
APAP SERPL-MCNC: <5 MCG/ML (ref 0–30)
AST SERPL-CCNC: 21 U/L (ref 1–40)
BARBITURATES UR QL SCN: NEGATIVE
BASOPHILS # BLD AUTO: 0.06 10*3/MM3 (ref 0–0.2)
BASOPHILS NFR BLD AUTO: 0.8 % (ref 0–1.5)
BENZODIAZ UR QL SCN: NEGATIVE
BILIRUB SERPL-MCNC: 1.5 MG/DL (ref 0–1.2)
BUN SERPL-MCNC: 7 MG/DL (ref 8–23)
BUN/CREAT SERPL: 16.3 (ref 7–25)
BUPRENORPHINE SERPL-MCNC: NEGATIVE NG/ML
CALCIUM SPEC-SCNC: 7.9 MG/DL (ref 8.6–10.5)
CANNABINOIDS SERPL QL: POSITIVE
CHLORIDE SERPL-SCNC: 102 MMOL/L (ref 98–107)
CO2 SERPL-SCNC: 26 MMOL/L (ref 22–29)
COCAINE UR QL: NEGATIVE
CREAT SERPL-MCNC: 0.43 MG/DL (ref 0.76–1.27)
DEPRECATED RDW RBC AUTO: 46.5 FL (ref 37–54)
DIGOXIN SERPL-MCNC: <0.3 NG/ML (ref 0.6–1.2)
EGFRCR SERPLBLD CKD-EPI 2021: 117.7 ML/MIN/1.73
ELLIPTOCYTES BLD QL SMEAR: NORMAL
EOSINOPHIL # BLD AUTO: 0.11 10*3/MM3 (ref 0–0.4)
EOSINOPHIL NFR BLD AUTO: 1.5 % (ref 0.3–6.2)
ERYTHROCYTE [DISTWIDTH] IN BLOOD BY AUTOMATED COUNT: 19.1 % (ref 12.3–15.4)
ETHANOL BLD-MCNC: <10 MG/DL (ref 0–10)
ETHANOL UR QL: <0.01 %
FENTANYL UR-MCNC: NEGATIVE NG/ML
GLOBULIN UR ELPH-MCNC: 3 GM/DL
GLUCOSE SERPL-MCNC: 194 MG/DL (ref 65–99)
HCT VFR BLD AUTO: 32.4 % (ref 37.5–51)
HGB BLD-MCNC: 9 G/DL (ref 13–17.7)
HOLD SPECIMEN: NORMAL
HOLD SPECIMEN: NORMAL
HYPOCHROMIA BLD QL: NORMAL
IMM GRANULOCYTES # BLD AUTO: 0.02 10*3/MM3 (ref 0–0.05)
IMM GRANULOCYTES NFR BLD AUTO: 0.3 % (ref 0–0.5)
LYMPHOCYTES # BLD AUTO: 0.68 10*3/MM3 (ref 0.7–3.1)
LYMPHOCYTES NFR BLD AUTO: 9.1 % (ref 19.6–45.3)
MACROCYTES BLD QL SMEAR: NORMAL
MCH RBC QN AUTO: 19.4 PG (ref 26.6–33)
MCHC RBC AUTO-ENTMCNC: 27.8 G/DL (ref 31.5–35.7)
MCV RBC AUTO: 69.7 FL (ref 79–97)
METHADONE UR QL SCN: NEGATIVE
MICROCYTES BLD QL: NORMAL
MONOCYTES # BLD AUTO: 0.8 10*3/MM3 (ref 0.1–0.9)
MONOCYTES NFR BLD AUTO: 10.8 % (ref 5–12)
NEUTROPHILS NFR BLD AUTO: 5.77 10*3/MM3 (ref 1.7–7)
NEUTROPHILS NFR BLD AUTO: 77.5 % (ref 42.7–76)
NRBC BLD AUTO-RTO: 0 /100 WBC (ref 0–0.2)
NT-PROBNP SERPL-MCNC: 213.4 PG/ML (ref 0–900)
OPIATES UR QL: NEGATIVE
OVALOCYTES BLD QL SMEAR: NORMAL
OXYCODONE UR QL SCN: NEGATIVE
PCP UR QL SCN: NEGATIVE
PLATELET # BLD AUTO: 158 10*3/MM3 (ref 140–450)
PMV BLD AUTO: 10.3 FL (ref 6–12)
POIKILOCYTOSIS BLD QL SMEAR: NORMAL
POTASSIUM SERPL-SCNC: 4.4 MMOL/L (ref 3.5–5.2)
PROT SERPL-MCNC: 5.3 G/DL (ref 6–8.5)
RBC # BLD AUTO: 4.65 10*6/MM3 (ref 4.14–5.8)
SALICYLATES SERPL-MCNC: <0.3 MG/DL
SMALL PLATELETS BLD QL SMEAR: ADEQUATE
SODIUM SERPL-SCNC: 133 MMOL/L (ref 136–145)
T4 FREE SERPL-MCNC: 1.1 NG/DL (ref 0.92–1.68)
TRICYCLICS UR QL SCN: NEGATIVE
TROPONIN T SERPL HS-MCNC: 15 NG/L
TSH SERPL DL<=0.05 MIU/L-ACNC: 3.38 UIU/ML (ref 0.27–4.2)
WBC MORPH BLD: NORMAL
WBC NRBC COR # BLD AUTO: 7.44 10*3/MM3 (ref 3.4–10.8)
WHOLE BLOOD HOLD COAG: NORMAL
WHOLE BLOOD HOLD SPECIMEN: NORMAL

## 2024-12-18 PROCEDURE — 80053 COMPREHEN METABOLIC PANEL: CPT

## 2024-12-18 PROCEDURE — 99285 EMERGENCY DEPT VISIT HI MDM: CPT

## 2024-12-18 PROCEDURE — 84484 ASSAY OF TROPONIN QUANT: CPT | Performed by: EMERGENCY MEDICINE

## 2024-12-18 PROCEDURE — 80162 ASSAY OF DIGOXIN TOTAL: CPT | Performed by: EMERGENCY MEDICINE

## 2024-12-18 PROCEDURE — 71045 X-RAY EXAM CHEST 1 VIEW: CPT

## 2024-12-18 PROCEDURE — 36415 COLL VENOUS BLD VENIPUNCTURE: CPT

## 2024-12-18 PROCEDURE — 99223 1ST HOSP IP/OBS HIGH 75: CPT | Performed by: HOSPITALIST

## 2024-12-18 PROCEDURE — 80179 DRUG ASSAY SALICYLATE: CPT

## 2024-12-18 PROCEDURE — 85025 COMPLETE CBC W/AUTO DIFF WBC: CPT

## 2024-12-18 PROCEDURE — 80143 DRUG ASSAY ACETAMINOPHEN: CPT

## 2024-12-18 PROCEDURE — 84443 ASSAY THYROID STIM HORMONE: CPT

## 2024-12-18 PROCEDURE — 80307 DRUG TEST PRSMV CHEM ANLYZR: CPT

## 2024-12-18 PROCEDURE — 83880 ASSAY OF NATRIURETIC PEPTIDE: CPT | Performed by: EMERGENCY MEDICINE

## 2024-12-18 PROCEDURE — 85007 BL SMEAR W/DIFF WBC COUNT: CPT

## 2024-12-18 PROCEDURE — 82077 ASSAY SPEC XCP UR&BREATH IA: CPT

## 2024-12-18 PROCEDURE — 84439 ASSAY OF FREE THYROXINE: CPT

## 2024-12-18 RX ORDER — FUROSEMIDE 10 MG/ML
80 INJECTION INTRAMUSCULAR; INTRAVENOUS ONCE
Status: DISCONTINUED | OUTPATIENT
Start: 2024-12-18 | End: 2024-12-18

## 2024-12-18 RX ORDER — FUROSEMIDE 40 MG/1
80 TABLET ORAL ONCE
Status: COMPLETED | OUTPATIENT
Start: 2024-12-18 | End: 2024-12-18

## 2024-12-18 RX ADMIN — FUROSEMIDE 80 MG: 40 TABLET ORAL at 19:55

## 2024-12-19 ENCOUNTER — APPOINTMENT (OUTPATIENT)
Facility: HOSPITAL | Age: 64
DRG: 291 | End: 2024-12-19
Payer: MEDICARE

## 2024-12-19 LAB
ANION GAP SERPL CALCULATED.3IONS-SCNC: 7.9 MMOL/L (ref 5–15)
BASOPHILS # BLD AUTO: 0.08 10*3/MM3 (ref 0–0.2)
BASOPHILS NFR BLD AUTO: 1.1 % (ref 0–1.5)
BH CV UPPER VENOUS LEFT INTERNAL JUGULAR AUGMENT: NORMAL
BH CV UPPER VENOUS LEFT INTERNAL JUGULAR COMPRESS: NORMAL
BH CV UPPER VENOUS LEFT INTERNAL JUGULAR PHASIC: NORMAL
BH CV UPPER VENOUS LEFT INTERNAL JUGULAR SPONT: NORMAL
BH CV UPPER VENOUS LEFT SUBCLAVIAN AUGMENT: NORMAL
BH CV UPPER VENOUS LEFT SUBCLAVIAN COMPRESS: NORMAL
BH CV UPPER VENOUS LEFT SUBCLAVIAN PHASIC: NORMAL
BH CV UPPER VENOUS LEFT SUBCLAVIAN SPONT: NORMAL
BH CV UPPER VENOUS RIGHT AXILLARY AUGMENT: NORMAL
BH CV UPPER VENOUS RIGHT AXILLARY COMPRESS: NORMAL
BH CV UPPER VENOUS RIGHT AXILLARY PHASIC: NORMAL
BH CV UPPER VENOUS RIGHT AXILLARY SPONT: NORMAL
BH CV UPPER VENOUS RIGHT BASILIC UPPER COMPRESS: NORMAL
BH CV UPPER VENOUS RIGHT BRACHIAL COMPRESS: NORMAL
BH CV UPPER VENOUS RIGHT CEPHALIC FOREARM COMPRESS: NORMAL
BH CV UPPER VENOUS RIGHT CEPHALIC UPPER COMPRESS: NORMAL
BH CV UPPER VENOUS RIGHT INTERNAL JUGULAR AUGMENT: NORMAL
BH CV UPPER VENOUS RIGHT INTERNAL JUGULAR COMPRESS: NORMAL
BH CV UPPER VENOUS RIGHT INTERNAL JUGULAR PHASIC: NORMAL
BH CV UPPER VENOUS RIGHT INTERNAL JUGULAR SPONT: NORMAL
BH CV UPPER VENOUS RIGHT RADIAL COMPRESS: NORMAL
BH CV UPPER VENOUS RIGHT SUBCLAVIAN AUGMENT: NORMAL
BH CV UPPER VENOUS RIGHT SUBCLAVIAN COMPRESS: NORMAL
BH CV UPPER VENOUS RIGHT SUBCLAVIAN PHASIC: NORMAL
BH CV UPPER VENOUS RIGHT SUBCLAVIAN SPONT: NORMAL
BH CV UPPER VENOUS RIGHT ULNAR COMPRESS: NORMAL
BH CV VAS PRELIMINARY FINDINGS SCRIPTING: 1
BUN SERPL-MCNC: 6 MG/DL (ref 8–23)
BUN/CREAT SERPL: 14 (ref 7–25)
CALCIUM SPEC-SCNC: 7.8 MG/DL (ref 8.6–10.5)
CHLORIDE SERPL-SCNC: 100 MMOL/L (ref 98–107)
CO2 SERPL-SCNC: 26.1 MMOL/L (ref 22–29)
CREAT SERPL-MCNC: 0.43 MG/DL (ref 0.76–1.27)
DEPRECATED RDW RBC AUTO: 46.1 FL (ref 37–54)
EGFRCR SERPLBLD CKD-EPI 2021: 117.7 ML/MIN/1.73
EOSINOPHIL # BLD AUTO: 0.14 10*3/MM3 (ref 0–0.4)
EOSINOPHIL NFR BLD AUTO: 1.9 % (ref 0.3–6.2)
ERYTHROCYTE [DISTWIDTH] IN BLOOD BY AUTOMATED COUNT: 19.1 % (ref 12.3–15.4)
FERRITIN SERPL-MCNC: 23.63 NG/ML (ref 30–400)
FOLATE SERPL-MCNC: 8 NG/ML (ref 4.78–24.2)
GLUCOSE BLDC GLUCOMTR-MCNC: 166 MG/DL (ref 70–99)
GLUCOSE BLDC GLUCOMTR-MCNC: 211 MG/DL (ref 70–99)
GLUCOSE BLDC GLUCOMTR-MCNC: 214 MG/DL (ref 70–99)
GLUCOSE BLDC GLUCOMTR-MCNC: 217 MG/DL (ref 70–99)
GLUCOSE BLDC GLUCOMTR-MCNC: 256 MG/DL (ref 70–99)
GLUCOSE SERPL-MCNC: 175 MG/DL (ref 65–99)
HCT VFR BLD AUTO: 30.6 % (ref 37.5–51)
HGB BLD-MCNC: 8.7 G/DL (ref 13–17.7)
IMM GRANULOCYTES # BLD AUTO: 0.03 10*3/MM3 (ref 0–0.05)
IMM GRANULOCYTES NFR BLD AUTO: 0.4 % (ref 0–0.5)
IRON 24H UR-MRATE: 18 MCG/DL (ref 59–158)
IRON SATN MFR SERPL: 6 % (ref 20–50)
LYMPHOCYTES # BLD AUTO: 0.91 10*3/MM3 (ref 0.7–3.1)
LYMPHOCYTES NFR BLD AUTO: 12.3 % (ref 19.6–45.3)
MAGNESIUM SERPL-MCNC: 1.5 MG/DL (ref 1.6–2.4)
MCH RBC QN AUTO: 19.5 PG (ref 26.6–33)
MCHC RBC AUTO-ENTMCNC: 28.4 G/DL (ref 31.5–35.7)
MCV RBC AUTO: 68.6 FL (ref 79–97)
MONOCYTES # BLD AUTO: 0.88 10*3/MM3 (ref 0.1–0.9)
MONOCYTES NFR BLD AUTO: 11.9 % (ref 5–12)
NEUTROPHILS NFR BLD AUTO: 5.37 10*3/MM3 (ref 1.7–7)
NEUTROPHILS NFR BLD AUTO: 72.4 % (ref 42.7–76)
NRBC BLD AUTO-RTO: 0 /100 WBC (ref 0–0.2)
PHOSPHATE SERPL-MCNC: 2.1 MG/DL (ref 2.5–4.5)
PLATELET # BLD AUTO: 177 10*3/MM3 (ref 140–450)
PMV BLD AUTO: 10.3 FL (ref 6–12)
POTASSIUM SERPL-SCNC: 3.5 MMOL/L (ref 3.5–5.2)
RBC # BLD AUTO: 4.46 10*6/MM3 (ref 4.14–5.8)
RETICS # AUTO: 0.07 10*6/MM3 (ref 0.02–0.13)
RETICS/RBC NFR AUTO: 1.57 % (ref 0.7–1.9)
SODIUM SERPL-SCNC: 134 MMOL/L (ref 136–145)
TIBC SERPL-MCNC: 317 MCG/DL (ref 298–536)
TRANSFERRIN SERPL-MCNC: 213 MG/DL (ref 200–360)
VIT B12 BLD-MCNC: 1367 PG/ML (ref 211–946)
WBC NRBC COR # BLD AUTO: 7.41 10*3/MM3 (ref 3.4–10.8)

## 2024-12-19 PROCEDURE — 25010000002 FUROSEMIDE PER 20 MG: Performed by: INTERNAL MEDICINE

## 2024-12-19 PROCEDURE — 85045 AUTOMATED RETICULOCYTE COUNT: CPT | Performed by: HOSPITALIST

## 2024-12-19 PROCEDURE — 93971 EXTREMITY STUDY: CPT | Performed by: SURGERY

## 2024-12-19 PROCEDURE — 25010000002 MAGNESIUM SULFATE 4 GM/100ML SOLUTION: Performed by: INTERNAL MEDICINE

## 2024-12-19 PROCEDURE — 99233 SBSQ HOSP IP/OBS HIGH 50: CPT | Performed by: INTERNAL MEDICINE

## 2024-12-19 PROCEDURE — 25010000002 NA FERRIC GLUC CPLX PER 12.5 MG: Performed by: INTERNAL MEDICINE

## 2024-12-19 PROCEDURE — 82607 VITAMIN B-12: CPT | Performed by: HOSPITALIST

## 2024-12-19 PROCEDURE — 25810000003 SODIUM CHLORIDE 0.9 % SOLUTION: Performed by: INTERNAL MEDICINE

## 2024-12-19 PROCEDURE — 82948 REAGENT STRIP/BLOOD GLUCOSE: CPT

## 2024-12-19 PROCEDURE — 82746 ASSAY OF FOLIC ACID SERUM: CPT | Performed by: HOSPITALIST

## 2024-12-19 PROCEDURE — 83735 ASSAY OF MAGNESIUM: CPT | Performed by: HOSPITALIST

## 2024-12-19 PROCEDURE — 85025 COMPLETE CBC W/AUTO DIFF WBC: CPT | Performed by: HOSPITALIST

## 2024-12-19 PROCEDURE — 84100 ASSAY OF PHOSPHORUS: CPT | Performed by: HOSPITALIST

## 2024-12-19 PROCEDURE — 84466 ASSAY OF TRANSFERRIN: CPT | Performed by: HOSPITALIST

## 2024-12-19 PROCEDURE — 82948 REAGENT STRIP/BLOOD GLUCOSE: CPT | Performed by: HOSPITALIST

## 2024-12-19 PROCEDURE — 82728 ASSAY OF FERRITIN: CPT | Performed by: HOSPITALIST

## 2024-12-19 PROCEDURE — 80048 BASIC METABOLIC PNL TOTAL CA: CPT | Performed by: HOSPITALIST

## 2024-12-19 PROCEDURE — 83540 ASSAY OF IRON: CPT | Performed by: HOSPITALIST

## 2024-12-19 PROCEDURE — 63710000001 INSULIN LISPRO (HUMAN) PER 5 UNITS: Performed by: HOSPITALIST

## 2024-12-19 PROCEDURE — 93971 EXTREMITY STUDY: CPT

## 2024-12-19 PROCEDURE — 25010000002 FUROSEMIDE PER 20 MG: Performed by: HOSPITALIST

## 2024-12-19 RX ORDER — IBUPROFEN 600 MG/1
1 TABLET ORAL
Status: DISCONTINUED | OUTPATIENT
Start: 2024-12-19 | End: 2025-01-13

## 2024-12-19 RX ORDER — SODIUM CHLORIDE 0.9 % (FLUSH) 0.9 %
10 SYRINGE (ML) INJECTION EVERY 12 HOURS SCHEDULED
Status: DISCONTINUED | OUTPATIENT
Start: 2024-12-19 | End: 2025-01-16 | Stop reason: HOSPADM

## 2024-12-19 RX ORDER — METOLAZONE 5 MG/1
10 TABLET ORAL DAILY
Status: DISCONTINUED | OUTPATIENT
Start: 2024-12-19 | End: 2024-12-22

## 2024-12-19 RX ORDER — BISACODYL 5 MG/1
5 TABLET, DELAYED RELEASE ORAL DAILY PRN
Status: DISCONTINUED | OUTPATIENT
Start: 2024-12-19 | End: 2024-12-19 | Stop reason: SDUPTHER

## 2024-12-19 RX ORDER — MINERAL OIL/HYDROPHIL PETROLAT
1 OINTMENT (GRAM) TOPICAL DAILY
Status: DISCONTINUED | OUTPATIENT
Start: 2024-12-24 | End: 2024-12-30

## 2024-12-19 RX ORDER — INSULIN LISPRO 100 [IU]/ML
2-9 INJECTION, SOLUTION INTRAVENOUS; SUBCUTANEOUS
Status: DISCONTINUED | OUTPATIENT
Start: 2024-12-19 | End: 2025-01-13

## 2024-12-19 RX ORDER — AMOXICILLIN 250 MG
2 CAPSULE ORAL 2 TIMES DAILY
Status: DISCONTINUED | OUTPATIENT
Start: 2024-12-19 | End: 2025-01-10

## 2024-12-19 RX ORDER — NICOTINE POLACRILEX 4 MG
15 LOZENGE BUCCAL
Status: DISCONTINUED | OUTPATIENT
Start: 2024-12-19 | End: 2025-01-13

## 2024-12-19 RX ORDER — BUPROPION HYDROCHLORIDE 150 MG/1
150 TABLET ORAL DAILY
Status: DISCONTINUED | OUTPATIENT
Start: 2024-12-19 | End: 2025-01-14

## 2024-12-19 RX ORDER — FENTANYL/ROPIVACAINE/NS/PF 2-625MCG/1
15 PLASTIC BAG, INJECTION (ML) EPIDURAL
Status: COMPLETED | OUTPATIENT
Start: 2024-12-19 | End: 2024-12-19

## 2024-12-19 RX ORDER — POLYETHYLENE GLYCOL 3350 17 G/17G
17 POWDER, FOR SOLUTION ORAL DAILY PRN
Status: DISCONTINUED | OUTPATIENT
Start: 2024-12-19 | End: 2025-01-10

## 2024-12-19 RX ORDER — MAGNESIUM SULFATE HEPTAHYDRATE 40 MG/ML
4 INJECTION, SOLUTION INTRAVENOUS ONCE
Status: COMPLETED | OUTPATIENT
Start: 2024-12-19 | End: 2024-12-19

## 2024-12-19 RX ORDER — ONDANSETRON 2 MG/ML
4 INJECTION INTRAMUSCULAR; INTRAVENOUS EVERY 6 HOURS PRN
Status: DISCONTINUED | OUTPATIENT
Start: 2024-12-19 | End: 2025-01-16 | Stop reason: HOSPADM

## 2024-12-19 RX ORDER — BACLOFEN 10 MG/1
10 TABLET ORAL 3 TIMES DAILY PRN
Status: DISCONTINUED | OUTPATIENT
Start: 2024-12-19 | End: 2024-12-20

## 2024-12-19 RX ORDER — ATORVASTATIN CALCIUM 10 MG/1
10 TABLET, FILM COATED ORAL NIGHTLY
Status: DISCONTINUED | OUTPATIENT
Start: 2024-12-19 | End: 2025-01-16 | Stop reason: HOSPADM

## 2024-12-19 RX ORDER — FUROSEMIDE 10 MG/ML
40 INJECTION INTRAMUSCULAR; INTRAVENOUS EVERY 12 HOURS
Status: DISCONTINUED | OUTPATIENT
Start: 2024-12-19 | End: 2024-12-21

## 2024-12-19 RX ORDER — BISACODYL 5 MG/1
5 TABLET, DELAYED RELEASE ORAL DAILY PRN
Status: DISCONTINUED | OUTPATIENT
Start: 2024-12-19 | End: 2025-01-10

## 2024-12-19 RX ORDER — BISACODYL 10 MG
10 SUPPOSITORY, RECTAL RECTAL DAILY PRN
Status: DISCONTINUED | OUTPATIENT
Start: 2024-12-19 | End: 2025-01-10

## 2024-12-19 RX ORDER — ACETAMINOPHEN 160 MG/5ML
650 SOLUTION ORAL EVERY 4 HOURS PRN
Status: DISCONTINUED | OUTPATIENT
Start: 2024-12-19 | End: 2025-01-16 | Stop reason: HOSPADM

## 2024-12-19 RX ORDER — ACETAMINOPHEN 650 MG/1
650 SUPPOSITORY RECTAL EVERY 4 HOURS PRN
Status: DISCONTINUED | OUTPATIENT
Start: 2024-12-19 | End: 2025-01-16 | Stop reason: HOSPADM

## 2024-12-19 RX ORDER — TRAMADOL HYDROCHLORIDE 50 MG/1
100 TABLET ORAL EVERY 6 HOURS PRN
Status: DISCONTINUED | OUTPATIENT
Start: 2024-12-19 | End: 2024-12-22

## 2024-12-19 RX ORDER — MINERAL OIL/HYDROPHIL PETROLAT
1 OINTMENT (GRAM) TOPICAL 2 TIMES DAILY
Status: DISPENSED | OUTPATIENT
Start: 2024-12-19 | End: 2024-12-24

## 2024-12-19 RX ORDER — FUROSEMIDE 10 MG/ML
40 INJECTION INTRAMUSCULAR; INTRAVENOUS DAILY
Status: DISCONTINUED | OUTPATIENT
Start: 2024-12-19 | End: 2024-12-19

## 2024-12-19 RX ORDER — ONDANSETRON 4 MG/1
4 TABLET, ORALLY DISINTEGRATING ORAL EVERY 6 HOURS PRN
Status: DISCONTINUED | OUTPATIENT
Start: 2024-12-19 | End: 2025-01-16 | Stop reason: HOSPADM

## 2024-12-19 RX ORDER — DEXTROSE MONOHYDRATE 25 G/50ML
25 INJECTION, SOLUTION INTRAVENOUS
Status: DISCONTINUED | OUTPATIENT
Start: 2024-12-19 | End: 2025-01-13

## 2024-12-19 RX ORDER — DIGOXIN 125 MCG
125 TABLET ORAL
Status: DISCONTINUED | OUTPATIENT
Start: 2024-12-19 | End: 2025-01-14

## 2024-12-19 RX ORDER — ACETAMINOPHEN 325 MG/1
650 TABLET ORAL EVERY 4 HOURS PRN
Status: DISCONTINUED | OUTPATIENT
Start: 2024-12-19 | End: 2025-01-16 | Stop reason: HOSPADM

## 2024-12-19 RX ORDER — SODIUM CHLORIDE 0.9 % (FLUSH) 0.9 %
10 SYRINGE (ML) INJECTION AS NEEDED
Status: DISCONTINUED | OUTPATIENT
Start: 2024-12-19 | End: 2025-01-16 | Stop reason: HOSPADM

## 2024-12-19 RX ORDER — METOPROLOL SUCCINATE 25 MG/1
12.5 TABLET, EXTENDED RELEASE ORAL
Status: DISCONTINUED | OUTPATIENT
Start: 2024-12-19 | End: 2025-01-14

## 2024-12-19 RX ORDER — SODIUM CHLORIDE 9 MG/ML
40 INJECTION, SOLUTION INTRAVENOUS AS NEEDED
Status: DISCONTINUED | OUTPATIENT
Start: 2024-12-19 | End: 2025-01-16 | Stop reason: HOSPADM

## 2024-12-19 RX ADMIN — SERTRALINE 50 MG: 50 TABLET, FILM COATED ORAL at 22:07

## 2024-12-19 RX ADMIN — INSULIN LISPRO 2 UNITS: 100 INJECTION, SOLUTION INTRAVENOUS; SUBCUTANEOUS at 08:22

## 2024-12-19 RX ADMIN — SODIUM CHLORIDE 125 MG: 9 INJECTION, SOLUTION INTRAVENOUS at 08:43

## 2024-12-19 RX ADMIN — DIGOXIN 125 MCG: 250 TABLET ORAL at 12:01

## 2024-12-19 RX ADMIN — MAGNESIUM SULFATE IN WATER FOR 4 G: 40 INJECTION INTRAVENOUS at 08:22

## 2024-12-19 RX ADMIN — INSULIN LISPRO 4 UNITS: 100 INJECTION, SOLUTION INTRAVENOUS; SUBCUTANEOUS at 22:07

## 2024-12-19 RX ADMIN — TRAMADOL HYDROCHLORIDE 100 MG: 50 TABLET, COATED ORAL at 09:40

## 2024-12-19 RX ADMIN — APIXABAN 5 MG: 5 TABLET, FILM COATED ORAL at 08:23

## 2024-12-19 RX ADMIN — BACLOFEN 10 MG: 10 TABLET ORAL at 10:07

## 2024-12-19 RX ADMIN — TRAMADOL HYDROCHLORIDE 100 MG: 50 TABLET, COATED ORAL at 17:06

## 2024-12-19 RX ADMIN — BACLOFEN 10 MG: 10 TABLET ORAL at 02:22

## 2024-12-19 RX ADMIN — METOLAZONE 10 MG: 5 TABLET ORAL at 09:40

## 2024-12-19 RX ADMIN — APIXABAN 5 MG: 5 TABLET, FILM COATED ORAL at 22:07

## 2024-12-19 RX ADMIN — INSULIN LISPRO 6 UNITS: 100 INJECTION, SOLUTION INTRAVENOUS; SUBCUTANEOUS at 12:35

## 2024-12-19 RX ADMIN — INSULIN LISPRO 4 UNITS: 100 INJECTION, SOLUTION INTRAVENOUS; SUBCUTANEOUS at 17:31

## 2024-12-19 RX ADMIN — FUROSEMIDE 40 MG: 10 INJECTION, SOLUTION INTRAMUSCULAR; INTRAVENOUS at 15:44

## 2024-12-19 RX ADMIN — ATORVASTATIN CALCIUM 10 MG: 10 TABLET, FILM COATED ORAL at 02:25

## 2024-12-19 RX ADMIN — ATORVASTATIN CALCIUM 10 MG: 10 TABLET, FILM COATED ORAL at 22:07

## 2024-12-19 RX ADMIN — BUPROPION HYDROCHLORIDE 150 MG: 150 TABLET, EXTENDED RELEASE ORAL at 12:01

## 2024-12-19 RX ADMIN — SODIUM CHLORIDE 15 MMOL: 9 INJECTION, SOLUTION INTRAVENOUS at 12:35

## 2024-12-19 RX ADMIN — METOPROLOL SUCCINATE 12.5 MG: 25 TABLET, EXTENDED RELEASE ORAL at 08:22

## 2024-12-19 RX ADMIN — FUROSEMIDE 40 MG: 10 INJECTION, SOLUTION INTRAMUSCULAR; INTRAVENOUS at 02:39

## 2024-12-19 RX ADMIN — SODIUM CHLORIDE 15 MMOL: 9 INJECTION, SOLUTION INTRAVENOUS at 09:51

## 2024-12-19 RX ADMIN — BACLOFEN 10 MG: 10 TABLET ORAL at 17:31

## 2024-12-19 RX ADMIN — SERTRALINE 50 MG: 50 TABLET, FILM COATED ORAL at 02:25

## 2024-12-19 NOTE — SIGNIFICANT NOTE
Wound Eval / Progress Noted    KEO Dominguez     Patient Name: Jose Shaikh  : 1960  MRN: [id number]  Today's Date: 2024                 Admit Date: 2024    Visit Dx:    ICD-10-CM ICD-9-CM   1. Hypervolemia, unspecified hypervolemia type  E87.70 276.69   2. Congestive heart failure, unspecified HF chronicity, unspecified heart failure type  I50.9 428.0   3. Suicidal ideation  R45.851 V62.84         CHF exacerbation        Past Medical History:   Diagnosis Date    Absence of toe of right foot     Acute osteomyelitis of left calcaneus  2021    Anxiety and depression     Arthritis     Cancer     Chronic pain     STATES HIS PAIN IS 10/10 AAT    Claustrophobia     Corns and callus     Diabetic ulcer of left heel associated with type 2 DM 2021    Diabetic ulcer of left heel associated with type 2 DM 2021    Diabetic ulcer of right midfoot associated with type 2 DM 2021    Difficulty walking     Essential hypertension 2021    Hammertoe     Hyperlipidemia LDL goal <100 2021    Ingrown toenail     Obesity     Paroxysmal atrial fibrillation 2021    Polyneuropathy     Pressure ulcer, stage 1     Tinea unguium     Type 2 diabetes mellitus with polyneuropathy         Past Surgical History:   Procedure Laterality Date    CYST REMOVAL      center of back; benign    EYE SURGERY      INCISION AND DRAINAGE ABSCESS      back    INCISION AND DRAINAGE LEG Right 12/10/2021    Procedure: INCISION AND DRAINAGE LOWER EXTREMITY;  Surgeon: Ash Leyva DPM;  Location: [facility] MAIN OR;  Service: Podiatry;  Laterality: Right;    OTHER SURGICAL HISTORY      Surgical clips left foot    TOE SURGERY Right     Removal of 5th toe    TRANS METATARSAL AMPUTATION Right 2021    Procedure: AMPUTATION TRANS METATARSAL;  Surgeon: Ash Leyva DPM;  Location: [facility] MAIN OR;  Service: Podiatry;  Laterality: Right;    VASCULAR SURGERY      WRIST SURGERY Left     repair of  injury         Physical Assessment:  Rash 12/19/24 0252 Right lower calf bulla/blister (Active)   Wound Image   12/19/24 0302    Moderate amount of serous weeping noted area approximately [id number] to lateral leg and medial leg with similar area approximately [id number]   Both with fluid filled area appearance and scaly thick skin   Scattered partial thickness skin openings and redness to area and about 1cm around area. Redness around is normal skin temp as rest of body. Tolerated cleansing with NS moist gauze without complaints of discomfort      Rash 12/19/24 0253 Left lower calf (Active)   Wound Image   12/19/24 0302   Left lower posterior lower leg with area of pink skin noted with Fluid filled area appearance and scaly thick skin   normal skin temp as rest of body. Tolerated cleansing with NS moist gauze without complaints of discomfort      Wound 12/19/24 0259 Right anterior foot (Active)    Edema present , redness present, scaly skin present to distal stump areas, no open areas currently noted to skin surfaces patient allowed to be assessed.      Wound 12/19/24 0255 Left anterior foot (Active)    Dry brown abrasion present to left foot toward medial aspect proximal to great toe, does have redness around area but does not feel warmer than adjacent tissue.   Edema is present, taunt and causing near blanching of skin to dorsal foot area - may benefit from light compression if ok with MD and patient allows and can tolerate        Wound 12/19/24 0255 Right gluteal (Active)   Wound Image   12/19/24 0303    Unable to assess - patient refused to be turned to assessed by wound nurse... photos reviewed, appears to be very red with MASD/IAD present. Patient does report laying in stool/urine for days at a time at home due to no caregiver and unable to get out of bed.   Small open area appears to be along right or left gluteal (depends on photo orientation) - suggesting orange top barrier to this area BID in thin layer       Wound 12/19/24 0304 Left torso (Active)   Wound Image   12/19/24 0305     Unable to assess - patient refused to be turned to assessed by wound nurse... photos reviewed,   Appears to have MASD to fold with pink to red skin present in photo- suggesting hygiene and moisture control.          Wound 12/19/24 0305 Right anterior hip (Active)   Wound Image   12/19/24 0305    Unable to assess fully - patient refused to be turned to or laid flat enough to be fully assessed by wound nurse... photos reviewed Redness present to fold, scattered areas of partial thickness skin loss and serous weeping present, taunt edema to right abdomen.  Tenderness present, no odor, no satellite lesions at present.   Suggesting moisture control and hygiene         Wound Check / Follow-up:  patient on bariatric bed with foam mattress on 5STU. Awake, alert , oriented but only allowing assessment of a few areas.     Patient stated was unable to get out of bed at home, reports laying in urine and stool for days at a time until he called EMS to bring him to ER to be cleaned up and then DC back to home and cycle repeated.     Edema noted to trunk and BLE. Trunk edema appears to be more concentrated along right side and right side of body is more effected by breakdown vs left (per photo review). Likely favors right side laying.    Thigh edema present- taunt but not weeping   BLE edema present into dorsal feet non weeping but skin on dorsal feet is blanched from amount of edema present - may weep if edema doesn't improve     Weeping is noted from BLE wounds (see above)   Left foot plantar aspect thick yellow scaly skin, suggesting hygiene and moisturizer   Right foot/remaining stump with similar appearance     (See above for assessments)     Left lower arm- noted with skin tear partial skin flap present. Loose cleansed under flap and then attempted to re-approximate, able to re-approximate 80%. Red moist partial thickness area present, ecchymosis  surrounding.   Cleansed with NS moist gauze, and cotton swabs, applied folded oil emulsion (adaptic) and silicone border dressing, suggest leave in place x 3days to allow skin flap to attempt to adhere (unless issue noted or dressing loosens)       Impression: poor hygiene, incontinence associated dermatitis and moisture associated skin damage, edema resulting in weeping areas to BLE. Poor self care.     Short term goals:  hygiene, moisture and edema control, skin health toward healing      Ange Jimenes RN    12/19/2024    13:49 EST

## 2024-12-19 NOTE — H&P
[facility]  Date: [facility]   Patient Name: Jose Shaikh  : 1960  MRN: [id number]  Primary Care Physician:  Provider, No Known  Date of admission: 2024    Subjective worsening lower extremity swelling, shortness of breath, suicidal ideation  Subjective   Chief Complaint: Worsening lower extremity swelling, shortness of breath, suicidal ideation    HPI: Patient is a 64-year-old man who presents to the emergency room for worsening lower extremity swelling, shortness of breath, and suicidal ideation.  Patient also states that he does not have anybody at home to take care of him.  He has chronic leg wounds.  He has a history of diabetes atrial fibrillation ulcers.  The patient states that he is going to take all his muscle relaxants to kill himself because he cannot go on living like this.    On arrival to the emergency room, patient's temperature 98.5, pulse 104, respiratory rate of 18, blood pressure 114/52, and he saturating 100% on room air.    Chest x-ray shows cardiomegaly and mild vascular congestion and edema.  Bibasilar opacities favoring atelectasis in the setting of low lung volumes.    On labs, patient has a sodium of 133, creatinine 0.43, calcium 7.9, alkaline phosphatase of 126, total bilirubin of 1.5, hemoglobin of 9.0.    Patient's toxicology screen is positive for THC.    Personal History     Past Medical History:  Past Medical History:   Diagnosis Date   • Absence of toe of right foot    • Acute osteomyelitis of left calcaneus  2021   • Anxiety and depression    • Arthritis    • Cancer    • Chronic pain     STATES HIS PAIN IS 10/10 AAT   • Claustrophobia    • Corns and callus    • Diabetic ulcer of left heel associated with type 2 DM 2021   • Diabetic ulcer of left heel associated with type 2 DM 2021   • Diabetic ulcer of right midfoot associated with type 2 DM 2021   • Difficulty walking    • Essential hypertension  2021   • Hammertoe    • Hyperlipidemia LDL goal <100 2021   • Ingrown toenail    • Obesity    • Paroxysmal atrial fibrillation 2021   • Polyneuropathy    • Pressure ulcer, stage 1    • Tinea unguium    • Type 2 diabetes mellitus with polyneuropathy        Past Surgical History:  Past Surgical History:   Procedure Laterality Date   • CYST REMOVAL      center of back; benign   • EYE SURGERY     • INCISION AND DRAINAGE ABSCESS      back   • INCISION AND DRAINAGE LEG Right 12/10/2021    Procedure: INCISION AND DRAINAGE LOWER EXTREMITY;  Surgeon: Ash Leyva DPM;  Location: [facility] MAIN OR;  Service: Podiatry;  Laterality: Right;   • OTHER SURGICAL HISTORY      Surgical clips left foot   • TOE SURGERY Right     Removal of 5th toe   • TRANS METATARSAL AMPUTATION Right 2021    Procedure: AMPUTATION TRANS METATARSAL;  Surgeon: Ash Leyva DPM;  Location: [facility] MAIN OR;  Service: Podiatry;  Laterality: Right;   • VASCULAR SURGERY     • WRIST SURGERY Left     repair of injury       Family History:   Family History   Problem Relation Age of Onset   • Hearing loss Mother    • Depression Mother    • Arthritis Mother    • Heart disease Mother    • Heart disease Father    • Cancer Father         Unspecified   • Cancer Sister    • Hearing loss Brother    • Heart disease Brother    • Diabetes Paternal Grandmother    • Learning disabilities Nephew    • Drug abuse Nephew    • COPD Nephew    • Alcohol abuse Nephew        Social History:   Social History     Socioeconomic History   • Marital status:    Tobacco Use   • Smoking status: Former     Current packs/day: 0.00     Types: Cigarettes     Quit date: 1990     Years since quittin.3   • Smokeless tobacco: Never   • Tobacco comments:     quit at age 30   Vaping Use   • Vaping status: Never Used   Substance and Sexual Activity   • Alcohol use: Not Currently     Comment: rare   • Drug use: Not Currently     Types:  Marijuana     Comment: occasionally, EVERY 3-4 MONTH   • Sexual activity: Defer       Home Medications:  Insulin Lispro (1 Unit Dial), acetaminophen, apixaban, baclofen, bisacodyl, digoxin, empagliflozin, metoprolol succinate XL, polyethylene glycol, sertraline, simvastatin, and spironolactone    Allergies:  Allergies   Allergen Reactions   • Adhesive Tape Rash       Review of Systems   All systems were reviewed and negative except for: Shortness of breath, suicidal ideation    Objective   Objective     Vitals:   Temp:  [98.5 °F (36.9 °C)] 98.5 °F (36.9 °C)  Heart Rate:  [] 97  Resp:  [18] 18  BP: (109-133)/(50-74) 133/74    Physical Exam    Constitutional: Awake, alert, no acute distress   Eyes: Pupils equal, sclerae anicteric, no conjunctival injection   HENT: NCAT, mucous membranes moist   Neck: Supple, no thyromegaly, no lymphadenopathy, trachea midline   Respiratory: Clear to auscultation bilaterally, nonlabored respirations    Cardiovascular: RRR, no murmurs, rubs, or gallops, palpable pedal pulses bilaterally   Gastrointestinal: Positive bowel sounds, soft, nontender, nondistended   Musculoskeletal: Lower extremity edema   Psychiatric: Appropriate affect, cooperative   Neurologic: Oriented x 3, strength symmetric in all extremities, Cranial Nerves grossly intact to confrontation, speech clear   Skin: No rashes     Result Review    Result Review:  I have personally reviewed the results from the time of this admission to 12/18/2024 22:12 EST and agree with these findings:  [x]  Laboratory  [x]  Microbiology  [x]  Radiology  [x]  EKG/Telemetry   [x]  Cardiology/Vascular   [x]  Pathology  [x]  Old records  []  Other:      Assessment & Plan   Assessment / Plan   #1 CHF exacerbation  #2 suicidal ideation  #3 chronic bilateral foot wounds  #4 paroxysmal atrial fibrillation  #5 diabetes mellitus  #6 morbid obesity    #1 Lasix for diuresis  #2 sitter placed, psychiatry consulted.  Continue Zoloft for now.  #3  wound care nurse consulted  #4 continue Eliquis, digoxin, beta-blocker  #5 insulin sliding scale  #6 bariatric bed ordered     VTE Prophylaxis:  Mechanical & pharmacologic VTE prophylaxis orders are signed & held.         CODE STATUS:    Level Of Support Discussed With: Patient  Code Status (Patient has no pulse and is not breathing): CPR (Attempt to Resuscitate)  Medical Interventions (Patient has pulse or is breathing): Full Support      Admission Status:  I believe this patient meets inpatient status.    Electronically signed by Blaine Landers DO, 12/18/24, 10:12 PM EST.

## 2024-12-19 NOTE — SIGNIFICANT NOTE
12/18/24 1919   Behavior WDL   Behavior WDL all;X   Interactions cooperative;argumentative   Motor Movement lethargic   Emotion Mood WDL   Emotion/Mood/Affect WDL all;X   Affect affect consistent with mood   Emotion/Mood depressed   Speech WDL   Speech WDL speech;WDL   Speech clear, coherent   Perceptual State WDL   Perceptual State WDL all;WDL   Hallucinations denies hallucinations   Perceptual State consistent with reality   Thought Process WDL   Thought Process WDL all;X   Delusions no delusions   Judgment and Insight judgment not appropriate to situation;insight not appropriate to situation   Thought Content suicidal thoughts   Thought Process illogical   Intellectual Performance WDL   Intellectual Performance WDL WDL;all   Intellectual Performance able to comprehend   Level of Consciousness Alert   Coping/Stress   Major Change/Loss/Stressor medical condition/diagnosis   Patient Personal Strengths self-reliant   Sources of Support friend(s)   Techniques to [place] with Loss/Stress/Change none   Reaction to Health Status depressed   Bradley Suicide Severity Rating Scale (Screener/Recent Self-Report)   1. Wish to be Dead (Past 1 Month) Y   2. Non-Specific Active Suicidal Thoughts (Past 1 Month) Y   3. Active Suicidal Ideation with any Methods (Not Plan) Without Intent to Act (Past 1 Month) Y   4. Active Suicidal Ideation with Some Intent to Act, Without Specific Plan (Past 1 Month) Y   5. Active Suicidal Ideation with Specific Plan and Intent (Past 1 Month) Y   6. Suicidal Behavior (Lifetime) N       SW met with pt this date per provider request. Pt states that he is here because he is tired of living like this and is going to go home and take all of his muscle relaxers and kill himself. He states that he has no one to take care of him, no one  to clean his bed up or clean him up. He states that he cannot walk so he is unable to get up and walk to the bathroom and he is here every other day to be cleaned up and  he is tired of living like this. He states that he will just end it tonight because he doesn't have anything to look forward to except dying. No hallucinations or HI reported.     TALITA Grimm, MSW, CSW

## 2024-12-19 NOTE — SIGNIFICANT NOTE
12/18/24 1935   Plan   Plan Comments Referrals sent for geriatric psych placement this date.

## 2024-12-19 NOTE — PROGRESS NOTES
[facility]   Hospitalist Progress Note  Date: 2024  Patient Name: Jose Shaikh  : 1960  MRN: [id number]  Date of admission: 2024      Subjective   Subjective     Chief Complaint: Lower extremity swelling    Summary: 64-year-old man who presents to the emergency room for worsening lower extremity swelling, shortness of breath, and suicidal ideation.  Patient also states that he does not have anybody at home to take care of him.  He has chronic leg wounds.  He has a history of diabetes atrial fibrillation ulcers.  The patient states that he is going to take all his muscle relaxants to kill himself because he cannot go on living like this.     Interval Followup: No acute events overnight.  He is already stating he no longer feels suicidal and has just been frustrated with his living situation.  He did have a home health but has not had that for a few weeks now.  Still with lower extremity swelling.    Objective   Objective     Vitals:   Temp:  [98.1 °F (36.7 °C)-99 °F (37.2 °C)] 99 °F (37.2 °C)  Heart Rate:  [] 87  Resp:  [16-18] 18  BP: (106-133)/(50-78) 107/60  Physical Exam    Constitutional: Awake, alert, no acute distress   Respiratory: Clear to auscultation bilaterally, nonlabored respirations    Cardiovascular: RRR, no murmurs, rubs, or gallops   Gastrointestinal: Positive bowel sounds, soft, nontender, nondistended   Neurologic: Oriented x 3, strength symmetric in all extremities, Cranial Nerves grossly intact to confrontation, speech clear    Result Review    I have personally reviewed the results below:  [x]  Laboratory personally reviewed BMP, CBC, magnesium, phosphorus, blood sugars  []  Microbiology  []  Radiology  []  EKG/Telemetry   []  Cardiology/Vascular   []  Pathology  []  Old records  []  Other:  CBC          12/15/2024    18:54 2024    16:34 2024    04:23   CBC   WBC 4.75  7.44  7.41    RBC 4.22  4.65  4.46    Hemoglobin 8.3  9.0  8.7    Hematocrit 29.9   32.4  30.6    MCV 70.9  69.7  68.6    MCH 19.7  19.4  19.5    MCHC 27.8  27.8  28.4    RDW 18.6  19.1  19.1    Platelets 125  158  177      CMP          12/15/2024    18:54 12/18/2024    16:34 12/19/2024    04:23   CMP   Glucose 199  194  175    BUN 8  7  6    Creatinine 0.32  0.43  0.43    EGFR 128.7  117.7  117.7    Sodium 137  133  134    Potassium 4.0  4.4  3.5    Chloride 106  102  100    Calcium 7.9  7.9  7.8    Total Protein 5.3  5.3     Albumin 2.5  2.3     Globulin 2.8  3.0     Total Bilirubin 0.8  1.5     Alkaline Phosphatase 126  126     AST (SGOT) 24  21     ALT (SGPT) 14  12     Albumin/Globulin Ratio 0.9  0.8     BUN/Creatinine Ratio 25.0  16.3  14.0    Anion Gap 6.7  5.0  7.9        Assessment & Plan   Assessment / Plan   Acute on chronic diastolic congestive heart failure with acute exacerbation  Suicidal ideation  Chronic bilateral foot wounds  Paroxysmal atrial fibrillation  Type 2 diabetes mellitus  Morbid obesity with BMI of 52  Debility with wheelchair dependence  Osteoarthritis of the hip  Chronic venous stasis  Deconditioning  Hypomagnesemia  Hypophosphatemia  Hemorrhoids  Anasarca    Continue to monitor in the hospital for workup and management of the above  Psychiatry consulted, patient clinically rescinded his suicidal claims.  Unclear if this was an attempt to get hospitalized or not but he does admit to being depressed about his living situation  Start Wellbutrin per psychiatry recommendations  Discontinue bedside sitter  Start Lasix 40 mg IV twice daily, add metolazone 10 mg daily  Trend renal function electrolytes closely on IV diuretics  Can use Tylenol, baclofen, or Ultram as needed for pain  Wound care consulted  Would benefit from lower extremity compression  Iron panel consistent with iron deficiency, will transfuse IV iron and monitor  Obtain right upper extremity ultrasound as he states he has had swelling since an IV was placed  Replace magnesium IV  Replace phosphorus  IV  Continue appropriate home medications  PT/OT/social work consulted  Trend renal function and electrolytes with a.m. BMP, magnesium   Trend Hgb and WBC with a.m. CBC     Discussed plan with RN, psychiatry    VTE Prophylaxis:  Pharmacologic & mechanical VTE prophylaxis orders are present.        CODE STATUS:   Level Of Support Discussed With: Patient  Code Status (Patient has no pulse and is not breathing): CPR (Attempt to Resuscitate)  Medical Interventions (Patient has pulse or is breathing): Full Support

## 2024-12-19 NOTE — CONSULTS
" [facility]   PSYCHIATRIC CONSULTATION    Patient Name: Jose Shaikh  : 1960  MRN: [id number]  Primary Care Physician:  Provider, No Known  Date of admission: 2024    Referring Provider: Dr. Ingram  Reason for Consultation: Depression with suicidal ideations    Subjective   Subjective     Chief Complaint: \"I have a lot going on with me and I just cannot do it on my own and needed help\"    HPI:     Jose Shaikh is a 64 y.o. male with a history of diabetes, A-fib, chronic leg wounds, and depression who was admitted for lower extremity swelling.    Patient has a very hard time taking care of himself because of his size.  His health problems also prevent him from moving around and being as active as he needs to be.  He reports that he has had increased leg swelling, has also had diarrhea.  He reports that he is been unable to take care of his personal hygiene.  Patient reports that he got feeling overwhelmed and made a statement for him to heart himself.    Patient reports that he was frustrated and overwhelmed because of how tired he was feeling about his current situation and that he made a statement about harming himself.  He reports that he would never do anything to harm himself and that he has never done anything in the past to harm himself.  He reports he is depressed over his current situation.    Reports depression symptoms of having a sad mood.  Also reports little sleep.  Feels that his hip pain is exacerbated by sleep deprivation and his depression.  He has very little energy.  He denies feeling hopeless or helpless.  Patient is looking for help in states he needs to get back in to see a psychiatrist as well as be on medication for depression.  He reports that Wellbutrin has helped in the past and would like to be back on that.      Review of Systems   All systems were reviewed and negative except for: Hip pain    Personal History     Past Medical History:   Diagnosis Date   • Absence " of toe of right foot    • Acute osteomyelitis of left calcaneus  08/18/2021   • Anxiety and depression    • Arthritis    • Cancer    • Chronic pain     STATES HIS PAIN IS 10/10 AAT   • Claustrophobia    • Corns and callus    • Diabetic ulcer of left heel associated with type 2 DM 08/18/2021   • Diabetic ulcer of left heel associated with type 2 DM 07/06/2021   • Diabetic ulcer of right midfoot associated with type 2 DM 08/18/2021   • Difficulty walking    • Essential hypertension 08/31/2021   • Hammertoe    • Hyperlipidemia LDL goal <100 08/31/2021   • Ingrown toenail    • Obesity    • Paroxysmal atrial fibrillation 08/31/2021   • Polyneuropathy    • Pressure ulcer, stage 1    • Tinea unguium    • Type 2 diabetes mellitus with polyneuropathy        Past Surgical History:   Procedure Laterality Date   • CYST REMOVAL      center of back; benign   • EYE SURGERY     • INCISION AND DRAINAGE ABSCESS      back   • INCISION AND DRAINAGE LEG Right 12/10/2021    Procedure: INCISION AND DRAINAGE LOWER EXTREMITY;  Surgeon: Ash Leyva DPM;  Location: [facility];  Service: Podiatry;  Laterality: Right;   • OTHER SURGICAL HISTORY      Surgical clips left foot   • TOE SURGERY Right     Removal of 5th toe   • TRANS METATARSAL AMPUTATION Right 12/02/2021    Procedure: AMPUTATION TRANS METATARSAL;  Surgeon: Ash Leyva DPM;  Location: [facility] OR;  Service: Podiatry;  Laterality: Right;   • VASCULAR SURGERY     • WRIST SURGERY Left     repair of injury       Past Psychiatric History: Has a history of depression and has seen a psychiatrist in the past but has been a long time    Psychiatric Hospitalizations: None    Suicide Attempts: No history of attempts    Prior Treatment and Medications Tried: Wellbutrin, does not recall names of any other medicines.        Family History: family history includes Alcohol abuse in his nephew; Arthritis in his mother; COPD in his nephew; Cancer in his father and  sister; Depression in his mother, sister, and sister; Diabetes in his paternal grandmother; Drug abuse in his nephew; Hearing loss in his brother and mother; Heart disease in his brother, father, and mother; Learning disabilities in his nephew; Suicide Attempts in his nephew. Otherwise pertinent FHx was reviewed and not pertinent to current issue.    Social History:     Born and raised in [place].  Moved to [place] with his mom when he was young.  He is a high school graduate and states that he has a equivalent to a PhD in biblical history and Mandaeism doctrine.  He has been   on 1 occasion.  He has 2 children from 2 different women.  He is on disability.    Was denied  service due to physical issues including orthopedic issues in his ankle    He is Alevism    Denies any history of abuse    Social History     Socioeconomic History   • Marital status:    Tobacco Use   • Smoking status: Former     Current packs/day: 0.00     Types: Cigarettes     Quit date: 1990     Years since quittin.3   • Smokeless tobacco: Never   • Tobacco comments:     quit at age 30   Vaping Use   • Vaping status: Never Used   Substance and Sexual Activity   • Alcohol use: Not Currently     Comment: rare   • Drug use: Not Currently     Types: Marijuana     Comment: occasionally, EVERY 3-4 MONTH   • Sexual activity: Defer       Substance Abuse History: reports that he quit smoking about 34 years ago. His smoking use included cigarettes. He has never used smokeless tobacco. He reports that he does not currently use alcohol. He reports that he does not currently use drugs after having used the following drugs: Marijuana.    Home Medications:  Insulin Lispro (1 Unit Dial), acetaminophen, apixaban, baclofen, bisacodyl, digoxin, empagliflozin, metoprolol succinate XL, polyethylene glycol, sertraline, simvastatin, and spironolactone      Allergies:  Allergies   Allergen Reactions   •  "Adhesive Tape Rash       Objective   Objective     Vitals:   Temp:  [98.1 °F (36.7 °C)-99 °F (37.2 °C)] 99 °F (37.2 °C)  Heart Rate:  [] 87  Resp:  [16-18] 18  BP: (106-133)/(50-78) 107/60  Physical Exam       Mental Status Exam:     Calm, cooperative, engaging male who appears appropriate for stated age.  He makes good eye contact participates in interview willingly.  He has appropriate emotional responses.  There is no psychomotor restlessness or agitation.  He is of average intelligence and reliable historian    Hygiene:   good  Cooperation:  Cooperative  Eye Contact:  Good  Psychomotor Behavior:  Appropriate  Mood: \"Better, down\"  Affect:  Appropriate and Restricted  Speech:  Normal  Language: Appropriate, relevant  Thought Process:  Goal directed and Linear  Thought Content:  Normal  Suicidal:  None  Homicidal:  None  Hallucinations:  None  Delusion:  None  Memory:  Intact  Orientation:  Person, Place, Time, and Situation  Reliability:  good  Insight:  Good  Judgement:  Good  Impulse Control:  Good    Result Review    Result Review:  I have personally reviewed the results from the time of this admission to 12/19/2024 11:44 EST and agree with these findings:  [x]  Laboratory  []  Microbiology  []  Radiology  []  EKG/Telemetry   []  Cardiology/Vascular   []  Pathology  []  Old records  []  Other:  Most notable findings include:     Assessment & Plan   Assessment / Plan     Brief Patient Summary:  Jose Shaikh is a 64 y.o. male who has a history of depression that is not currently treated.  Denying current suicidal ideations    Active Hospital Problems:  Active Hospital Problems    Diagnosis    • **CHF exacerbation          Plan:   1) patient denying suicidal ideations.  Reports that he was feeling frustrated and overwhelmed and made statements he should make.  We will discontinue suicide precautions and sitter.  2) patient reports Wellbutrin being very effective for him in the past and would like to be " back on a more initiate Wellbutrin  3) no need for acute inpatient psychiatric care  4) we will follow peripherally make treatment recommendations as indicated, patient may discharge when medically stable per psych        Part of this note may be an electronic transcription/translation of spoken language to printed text using the Dragon Dictation System.    Electronically signed by Carroll Summers MD, 12/19/24, 11:39 AM EST.

## 2024-12-19 NOTE — SIGNIFICANT NOTE
Educated patient on need to turn   He wanted to refuse air mattress then decided he would try it  He is requesting trapeze   Vendor contacted to bring bariatric frame with low air loss mattress and a trapeze today.

## 2024-12-19 NOTE — ED PROVIDER NOTES
Time: 7:08 PM EST  Date of encounter:  12/18/2024  Independent Historian/Clinical History and Information was obtained by:   Patient    History is limited by: N/A    Chief Complaint: Swelling, SI      History of Present Illness:  Patient is a 64 y.o. year old male who presents to the emergency department for evaluation of swelling and SI.  Patient reports that he does not have anybody to take care of him at home.  States he has chronic wounds as well as leg swelling.  He has a history of diabetes, A-fib, ulcerations.  States that he has more swelling and does not want to live like this at home.  States that he wants to take all his muscle relaxers to try to kill himself.      Patient Care Team  Primary Care Provider: Provider, No Known    Past Medical History:     Allergies   Allergen Reactions    Adhesive Tape Rash     Past Medical History:   Diagnosis Date    Absence of toe of right foot     Acute osteomyelitis of left calcaneus  08/18/2021    Anxiety and depression     Arthritis     Cancer     Chronic pain     STATES HIS PAIN IS 10/10 AAT    Claustrophobia     Corns and callus     Diabetic ulcer of left heel associated with type 2 DM 08/18/2021    Diabetic ulcer of left heel associated with type 2 DM 07/06/2021    Diabetic ulcer of right midfoot associated with type 2 DM 08/18/2021    Difficulty walking     Essential hypertension 08/31/2021    Hammertoe     Hyperlipidemia LDL goal <100 08/31/2021    Ingrown toenail     Obesity     Paroxysmal atrial fibrillation 08/31/2021    Polyneuropathy     Pressure ulcer, stage 1     Tinea unguium     Type 2 diabetes mellitus with polyneuropathy      Past Surgical History:   Procedure Laterality Date    CYST REMOVAL      center of back; benign    EYE SURGERY      INCISION AND DRAINAGE ABSCESS      back    INCISION AND DRAINAGE LEG Right 12/10/2021    Procedure: INCISION AND DRAINAGE LOWER EXTREMITY;  Surgeon: Ash Leyva DPM;  Location: [facility] MAIN OR;  Service:  Podiatry;  Laterality: Right;    OTHER SURGICAL HISTORY      Surgical clips left foot    TOE SURGERY Right     Removal of 5th toe    TRANS METATARSAL AMPUTATION Right 12/02/2021    Procedure: AMPUTATION TRANS METATARSAL;  Surgeon: Ash Leyva DPM;  Location: [facility] MAIN OR;  Service: Podiatry;  Laterality: Right;    VASCULAR SURGERY      WRIST SURGERY Left     repair of injury     Family History   Problem Relation Age of Onset    Hearing loss Mother     Depression Mother     Arthritis Mother     Heart disease Mother     Heart disease Father     Cancer Father         Unspecified    Cancer Sister     Hearing loss Brother     Heart disease Brother     Diabetes Paternal Grandmother     Learning disabilities Nephew     Drug abuse Nephew     COPD Nephew     Alcohol abuse Nephew        Home Medications:  Prior to Admission medications    Medication Sig Start Date End Date Taking? Authorizing Provider   acetaminophen (TYLENOL) 650 MG 8 hr tablet Take 1 tablet by mouth Every 8 (Eight) Hours As Needed for Mild Pain.    Provider, MD Laura   apixaban (ELIQUIS) 5 MG tablet tablet Take 1 tablet by mouth Every 12 (Twelve) Hours. 1/31/23   Delia Cervantes APRN   baclofen (LIORESAL) 10 MG tablet Take 1 tablet by mouth 3 (Three) Times a Day As Needed for Muscle Spasms. 10/24/24   Wendy Zee DO   bisacodyl 5 MG EC tablet Take 1 tablet by mouth Daily As Needed for Constipation for up to 5 days. 12/16/24 12/21/24  Ha Schwab DO   digoxin (LANOXIN) 125 MCG tablet Take 1 tablet by mouth Daily for 30 days. 6/15/23 9/10/23  Joe Haynes MD   empagliflozin (JARDIANCE) 10 MG tablet tablet Take 1 tablet by mouth Daily. 10/24/24   Wendy Zee DO   Insulin Lispro, 1 Unit Dial, (HumaLOG KwikPen) 100 UNIT/ML solution pen-injector Inject subcutaneously 3 times a day with meals. Use sliding scale. Max daily dose 45units Blood Glucose 150-199- 3units 200-249- 5units 250-299- 8 units 300-349-  "10units 350-400- 12units >400- 14units & Call Provider 10/23/24   Wendy Zee DO   metoprolol succinate XL (TOPROL-XL) 25 MG 24 hr tablet Take 0.5 tablets by mouth Daily. 10/24/24   Wendy Zee DO   polyethylene glycol (MIRALAX) 17 g packet Take 17 g by mouth Daily for 30 days. 12/16/24 1/15/25  Ha Schwab DO   pregabalin (LYRICA) 25 MG capsule Take 1 capsule by mouth 3 (Three) Times a Day for 3 days. 6/15/23 9/10/23  Joe Haynes MD   sertraline (ZOLOFT) 50 MG tablet Take 1 tablet by mouth Every Night. 10/23/24   Wendy Zee DO   simvastatin (ZOCOR) 20 MG tablet Take 1 tablet by mouth Every Night. 23   Delia Cervantes APRN   spironolactone (ALDACTONE) 25 MG tablet Take 1 tablet by mouth 2 (Two) Times a Day for 7 days. 24  Ha Schwab DO        Social History:   Social History     Tobacco Use    Smoking status: Former     Current packs/day: 0.00     Types: Cigarettes     Quit date: 1990     Years since quittin.3    Smokeless tobacco: Never    Tobacco comments:     quit at age 30   Vaping Use    Vaping status: Never Used   Substance Use Topics    Alcohol use: Not Currently     Comment: rare    Drug use: Not Currently     Types: Marijuana     Comment: occasionally, EVERY 3-4 MONTH         Review of Systems:  Review of Systems   Skin:  Positive for wound.        Physical Exam:  /74 (BP Location: Right arm, Patient Position: Lying)   Pulse 97   Temp 98.5 °F (36.9 °C) (Oral)   Resp 18   Ht 188 cm (74\")   Wt (!) 195 kg (430 lb 12.5 oz)   SpO2 99%   BMI 55.31 kg/m²     Physical Exam  Vitals and nursing note reviewed.   Constitutional:       Appearance: Normal appearance.   HENT:      Head: Normocephalic and atraumatic.   Eyes:      General: No scleral icterus.  Cardiovascular:      Rate and Rhythm: Normal rate and regular rhythm.      Heart sounds: Normal heart sounds.   Pulmonary:      Effort: Pulmonary effort is normal.      " Breath sounds: Normal breath sounds.   Abdominal:      Palpations: Abdomen is soft.      Tenderness: There is no abdominal tenderness.   Musculoskeletal:         General: Normal range of motion.      Cervical back: Normal range of motion.      Right lower leg: Edema present.      Left lower leg: Edema present.      Comments: Chronic swelling to bilateral lower extremities.  Previous amputations noted.   Skin:     Findings: No rash.      Comments: Patient with chronic wounds in his folds.   Neurological:      General: No focal deficit present.      Mental Status: He is alert.                    Medical Decision Making:      Comorbidities that affect care:    Diabetes, hypertension, edema    External Notes reviewed:    Reviewed multiple previous ER visits,      The following orders were placed and all results were independently analyzed by me:  Orders Placed This Encounter   Procedures    XR Chest 1 View    [place] Draw    Comprehensive Metabolic Panel    Ethanol    Urine Drug Screen - Urine, Clean Catch    Acetaminophen Level    Salicylate Level    TSH    T4, Free    CBC Auto Differential    Fentanyl, Urine - Urine, Clean Catch    Scan Slide    Digoxin Level    BNP    High Sensitivity Troponin T    NPO Diet NPO Type: Strict NPO    Contact Adult Protection    Psych / Access to See    Inpatient Hospitalist Consult    Suicide Precautions    CBC & Differential    Green Top (Gel)    Lavender Top    Gold Top - SST    Light Blue Top       Medications Given in the Emergency Department:  Medications   furosemide (LASIX) tablet 80 mg (80 mg Oral Given 12/18/24 1955)        ED Course:    ED Course as of 12/18/24 2124   Wed Dec 18, 2024   2115 Spoke with Dr. Landers who agrees to admit [MA]      ED Course User Index  [MA] Jayden Xavier MD       Labs:    Lab Results (last 24 hours)       Procedure Component Value Units Date/Time    CBC & Differential [[id number]]  (Abnormal) Collected: 12/18/24 1634    Specimen: Blood  Updated: 12/18/24 1738    Narrative:      The following orders were created for panel order CBC & Differential.  Procedure                               Abnormality         Status                     ---------                               -----------         ------                     CBC Auto Differential[[id number]]        Abnormal            Final result               Scan Slide[[id number]]                                       Final result                 Please view results for these tests on the individual orders.    Comprehensive Metabolic Panel [[id number]]  (Abnormal) Collected: 12/18/24 1634    Specimen: Blood Updated: 12/18/24 1700     Glucose 194 mg/dL      BUN 7 mg/dL      Creatinine 0.43 mg/dL      Sodium 133 mmol/L      Potassium 4.4 mmol/L      Chloride 102 mmol/L      CO2 26.0 mmol/L      Calcium 7.9 mg/dL      Total Protein 5.3 g/dL      Albumin 2.3 g/dL      ALT (SGPT) 12 U/L      AST (SGOT) 21 U/L      Alkaline Phosphatase 126 U/L      Total Bilirubin 1.5 mg/dL      Globulin 3.0 gm/dL      A/G Ratio 0.8 g/dL      BUN/Creatinine Ratio 16.3     Anion Gap 5.0 mmol/L      eGFR 117.7 mL/min/1.73     Narrative:      GFR Categories in Chronic Kidney Disease (CKD)      GFR Category          GFR (mL/min/1.73)    Interpretation  G1                     90 or greater         Normal or high (1)  G2                      60-89                Mild decrease (1)  G3a                   45-59                Mild to moderate decrease  G3b                   30-44                Moderate to severe decrease  G4                    15-29                Severe decrease  G5                    14 or less           Kidney failure          (1)In the absence of evidence of kidney disease, neither GFR category G1 or G2 fulfill the criteria for CKD.    eGFR calculation 2021 CKD-EPI creatinine equation, which does not include race as a factor    Ethanol [[id number]] Collected: 12/18/24 1634    Specimen: Blood Updated: 12/18/24 1700      Ethanol <10 mg/dL      Ethanol % <0.010 %     Narrative:      Ethanol (Plasma)  <10 Essentially Negative    Toxic Concentrations           mg/dL    Flushing, slowing of reflexes    Impaired visual activity         Depression of CNS              >100  Possible Coma                  >300       Urine Drug Screen - Urine, Clean Catch [[id number]]  (Abnormal) Collected: 12/18/24 1634    Specimen: Urine, Clean Catch Updated: 12/18/24 1655     THC, Screen, Urine Positive     Phencyclidine (PCP), Urine Negative     Cocaine Screen, Urine Negative     Methamphetamine, Ur Negative     Opiate Screen Negative     Amphetamine Screen, Urine Negative     Benzodiazepine Screen, Urine Negative     Tricyclic Antidepressants Screen Negative     Methadone Screen, Urine Negative     Barbiturates Screen, Urine Negative     Oxycodone Screen, Urine Negative     Buprenorphine, Screen, Urine Negative    Narrative:      Cutoff For Drugs Screened:    Amphetamines               500 ng/ml  Barbiturates               200 ng/ml  Benzodiazepines            150 ng/ml  Cocaine                    150 ng/ml  Methadone                  200 ng/ml  Opiates                    100 ng/ml  Phencyclidine               25 ng/ml  THC                         50 ng/ml  Methamphetamine            500 ng/ml  Tricyclic Antidepressants  300 ng/ml  Oxycodone                  100 ng/ml  Buprenorphine               10 ng/ml    The normal value for all drugs tested is negative. This report includes unconfirmed screening results, with the cutoff values listed, to be used for medical treatment purposes only.  Unconfirmed results must not be used for non-medical purposes such as employment or legal testing.  Clinical consideration should be applied to any drug of abuse test, particularly when unconfirmed results are used.      Acetaminophen Level [[id number]]  (Normal) Collected: 12/18/24 1634    Specimen: Blood Updated: 12/18/24 1700     Acetaminophen <5.0  mcg/mL     Salicylate Level [[id number]]  (Normal) Collected: 12/18/24 1634    Specimen: Blood Updated: 12/18/24 1700     Salicylate <0.3 mg/dL     TSH [[id number]]  (Normal) Collected: 12/18/24 1634    Specimen: Blood Updated: 12/18/24 1707     TSH 3.380 uIU/mL     T4, Free [[id number]]  (Normal) Collected: 12/18/24 1634    Specimen: Blood Updated: 12/18/24 1707     Free T4 1.10 ng/dL     CBC Auto Differential [[id number]]  (Abnormal) Collected: 12/18/24 1634    Specimen: Blood Updated: 12/18/24 1738     WBC 7.44 10*3/mm3      RBC 4.65 10*6/mm3      Hemoglobin 9.0 g/dL      Hematocrit 32.4 %      MCV 69.7 fL      MCH 19.4 pg      MCHC 27.8 g/dL      RDW 19.1 %      RDW-SD 46.5 fl      MPV 10.3 fL      Platelets 158 10*3/mm3      Neutrophil % 77.5 %      Lymphocyte % 9.1 %      Monocyte % 10.8 %      Eosinophil % 1.5 %      Basophil % 0.8 %      Immature Grans % 0.3 %      Neutrophils, Absolute 5.77 10*3/mm3      Lymphocytes, Absolute 0.68 10*3/mm3      Monocytes, Absolute 0.80 10*3/mm3      Eosinophils, Absolute 0.11 10*3/mm3      Basophils, Absolute 0.06 10*3/mm3      Immature Grans, Absolute 0.02 10*3/mm3      nRBC 0.0 /100 WBC     Fentanyl, Urine - Urine, Clean Catch [[id number]]  (Normal) Collected: 12/18/24 1634    Specimen: Urine, Clean Catch Updated: 12/18/24 1655     Fentanyl, Urine Negative    Narrative:      Negative Threshold:      Fentanyl 5 ng/mL     The normal value for the drug tested is negative. This report includes final unconfirmed screening results to be used for medical treatment purposes only. Unconfirmed results must not be used for non-medical purposes such as employment or legal testing. Clinical consideration should be applied to any drug of abuse test, particularly when unconfirmed results are used.           Scan Slide [[id number]] Collected: 12/18/24 1634    Specimen: Blood Updated: 12/18/24 1738     Anisocytosis Slight/1+     Elliptocytes Slight/1+     Hypochromia Mod/2+     Microcytes  Slight/1+     Macrocytes Slight/1+     Ovalocytes Slight/1+     Poikilocytes Slight/1+     WBC Morphology Normal     Platelet Estimate Adequate    Digoxin Level [[id number]]  (Abnormal) Collected: 12/18/24 1634    Specimen: Blood Updated: 12/18/24 1940     Digoxin <0.30 ng/mL     BNP [[id number]]  (Normal) Collected: 12/18/24 1634    Specimen: Blood Updated: 12/18/24 2003     proBNP 213.4 pg/mL     Narrative:      This assay is used as an aid in the diagnosis of individuals suspected of having heart failure. It can be used as an aid in the diagnosis of acute decompensated heart failure (ADHF) in patients presenting with signs and symptoms of ADHF to the emergency department (ED). In addition, NT-proBNP of <300 pg/mL indicates ADHF is not likely.    Age Range Result Interpretation  NT-proBNP Concentration (pg/mL:      <50             Positive            >450                   Gray                 300-450                    Negative             <300    50-75           Positive            >900                  Gray                300-900                  Negative            <300      >75             Positive            >1800                  Gray                300-1800                  Negative            <300    High Sensitivity Troponin T [[id number]]  (Normal) Collected: 12/18/24 1634    Specimen: Blood Updated: 12/18/24 2003     HS Troponin T 15 ng/L     Narrative:      High Sensitive Troponin T Reference Range:  <14.0 ng/L- Negative Female for AMI  <22.0 ng/L- Negative Male for AMI  >=14 - Abnormal Female indicating possible myocardial injury.  >=22 - Abnormal Male indicating possible myocardial injury.   Clinicians would have to utilize clinical acumen, EKG, Troponin, and serial changes to determine if it is an Acute Myocardial Infarction or myocardial injury due to an underlying chronic condition.                  Imaging:    XR Chest 1 View    Result Date: 12/18/2024  XR CHEST 1 VW Date of Exam: 12/18/2024 7:50  PM EST Indication: swelling Comparison: Chest radiograph 12/10/2024 Findings: Cardiomegaly. Slight increased prominence of the central vasculature and interstitium relative to prior exam. Mild bibasilar opacities favor atelectasis in the setting of lung volumes. No large effusion or pneumothorax. Aortic atherosclerotic disease. Degenerative related osseous change.     Impression: 1. Cardiomegaly with suspected mild vascular congestion and edema. 2. Bibasilar opacities favoring atelectasis in the setting of low lung volumes. Correlate for signs of infection. Electronically Signed: Aguilar Vila MD  12/18/2024 8:20 PM EST  Workstation ID: [id number]       Differential Diagnosis and Discussion:    Edema: Based on the patient's history, signs, and symptoms, the differential diagnosis includes but is not limited to heart failure, kidney disease, liver disease, venous insufficiency, lymphedema, pregnancy, medications, allergic reactions.  Psychiatric: Differential diagnosis includes but is not limited to depression, psychosis, bipolar disorder, anxiety, manic episode, schizophrenia, and substance abuse.  Wound Evaluation: Differential diagnosis includes but is not limited to laceration, abrasion, puncture, burn, ulcer, cellulitis, abscess, vasculitis, malignancy, and rash.    PROCEDURES:    Labs were drawn in the emergency department and all labs were reviewed and interpreted by me.    No orders to display       Procedures    MDM       Patient is a 66-year-old gentleman well-known to this facility who presents with complaints of swelling as well as suicidal ideation.  Appears to have fluid overload as well as some what appears to be CHF.  Lasix was given here.  He is also complaining of suicidal ideation.  He does have significant swelling diffusely on his legs as well as chronic wounds.  Due to pulmonary edema will consult the hospitalist.  Will admit for further workup management.              Patient Care  Considerations:          Consultants/Shared Management Plan:    Hospitalist: I have discussed the case with Dr. Landers who agrees to accept the patient for admission.    Social Determinants of Health:          Disposition and Care Coordination:    Admit:   Through independent evaluation of the patient's history, physical, and imperical data, the patient meets criteria for inpatient admission to the hospital.        Final diagnoses:   Hypervolemia, unspecified hypervolemia type   Congestive heart failure, unspecified HF chronicity, unspecified heart failure type   Suicidal ideation        ED Disposition       ED Disposition   Decision to Admit    Condition   --    Comment   --               This medical record created using voice recognition software.             Jayden Xavier MD  12/18/24 [id number]

## 2024-12-20 LAB
ANION GAP SERPL CALCULATED.3IONS-SCNC: 5.3 MMOL/L (ref 5–15)
BASOPHILS # BLD AUTO: 0.06 10*3/MM3 (ref 0–0.2)
BASOPHILS NFR BLD AUTO: 1.1 % (ref 0–1.5)
BUN SERPL-MCNC: 6 MG/DL (ref 8–23)
BUN/CREAT SERPL: 16.7 (ref 7–25)
CALCIUM SPEC-SCNC: 7.5 MG/DL (ref 8.6–10.5)
CHLORIDE SERPL-SCNC: 97 MMOL/L (ref 98–107)
CO2 SERPL-SCNC: 28.7 MMOL/L (ref 22–29)
CREAT SERPL-MCNC: 0.36 MG/DL (ref 0.76–1.27)
DEPRECATED RDW RBC AUTO: 45.2 FL (ref 37–54)
EGFRCR SERPLBLD CKD-EPI 2021: 124.2 ML/MIN/1.73
EOSINOPHIL # BLD AUTO: 0.19 10*3/MM3 (ref 0–0.4)
EOSINOPHIL NFR BLD AUTO: 3.5 % (ref 0.3–6.2)
ERYTHROCYTE [DISTWIDTH] IN BLOOD BY AUTOMATED COUNT: 19.1 % (ref 12.3–15.4)
GLUCOSE BLDC GLUCOMTR-MCNC: 170 MG/DL (ref 70–99)
GLUCOSE BLDC GLUCOMTR-MCNC: 184 MG/DL (ref 70–99)
GLUCOSE BLDC GLUCOMTR-MCNC: 187 MG/DL (ref 70–99)
GLUCOSE BLDC GLUCOMTR-MCNC: 190 MG/DL (ref 70–99)
GLUCOSE SERPL-MCNC: 204 MG/DL (ref 65–99)
HCT VFR BLD AUTO: 28.5 % (ref 37.5–51)
HGB BLD-MCNC: 8.2 G/DL (ref 13–17.7)
IMM GRANULOCYTES # BLD AUTO: 0.01 10*3/MM3 (ref 0–0.05)
IMM GRANULOCYTES NFR BLD AUTO: 0.2 % (ref 0–0.5)
LYMPHOCYTES # BLD AUTO: 0.99 10*3/MM3 (ref 0.7–3.1)
LYMPHOCYTES NFR BLD AUTO: 18.2 % (ref 19.6–45.3)
MAGNESIUM SERPL-MCNC: 1.6 MG/DL (ref 1.6–2.4)
MCH RBC QN AUTO: 19.5 PG (ref 26.6–33)
MCHC RBC AUTO-ENTMCNC: 28.8 G/DL (ref 31.5–35.7)
MCV RBC AUTO: 67.9 FL (ref 79–97)
MONOCYTES # BLD AUTO: 0.75 10*3/MM3 (ref 0.1–0.9)
MONOCYTES NFR BLD AUTO: 13.8 % (ref 5–12)
NEUTROPHILS NFR BLD AUTO: 3.44 10*3/MM3 (ref 1.7–7)
NEUTROPHILS NFR BLD AUTO: 63.2 % (ref 42.7–76)
NRBC BLD AUTO-RTO: 0 /100 WBC (ref 0–0.2)
PHOSPHATE SERPL-MCNC: 2.8 MG/DL (ref 2.5–4.5)
PLATELET # BLD AUTO: 161 10*3/MM3 (ref 140–450)
PMV BLD AUTO: ABNORMAL FL
POTASSIUM SERPL-SCNC: 3 MMOL/L (ref 3.5–5.2)
RBC # BLD AUTO: 4.2 10*6/MM3 (ref 4.14–5.8)
SODIUM SERPL-SCNC: 131 MMOL/L (ref 136–145)
WBC NRBC COR # BLD AUTO: 5.44 10*3/MM3 (ref 3.4–10.8)

## 2024-12-20 PROCEDURE — 63710000001 INSULIN LISPRO (HUMAN) PER 5 UNITS: Performed by: HOSPITALIST

## 2024-12-20 PROCEDURE — 80048 BASIC METABOLIC PNL TOTAL CA: CPT | Performed by: HOSPITALIST

## 2024-12-20 PROCEDURE — 25010000002 NA FERRIC GLUC CPLX PER 12.5 MG: Performed by: INTERNAL MEDICINE

## 2024-12-20 PROCEDURE — 25010000002 MAGNESIUM SULFATE 2 GM/50ML SOLUTION: Performed by: INTERNAL MEDICINE

## 2024-12-20 PROCEDURE — 82948 REAGENT STRIP/BLOOD GLUCOSE: CPT | Performed by: HOSPITALIST

## 2024-12-20 PROCEDURE — 83735 ASSAY OF MAGNESIUM: CPT | Performed by: HOSPITALIST

## 2024-12-20 PROCEDURE — 82948 REAGENT STRIP/BLOOD GLUCOSE: CPT

## 2024-12-20 PROCEDURE — 85025 COMPLETE CBC W/AUTO DIFF WBC: CPT | Performed by: HOSPITALIST

## 2024-12-20 PROCEDURE — 25010000002 HYDROMORPHONE 1 MG/ML SOLUTION: Performed by: UROLOGY

## 2024-12-20 PROCEDURE — 99222 1ST HOSP IP/OBS MODERATE 55: CPT | Performed by: UROLOGY

## 2024-12-20 PROCEDURE — 63710000001 INSULIN GLARGINE PER 5 UNITS: Performed by: INTERNAL MEDICINE

## 2024-12-20 PROCEDURE — 51702 INSERT TEMP BLADDER CATH: CPT | Performed by: UROLOGY

## 2024-12-20 PROCEDURE — 99233 SBSQ HOSP IP/OBS HIGH 50: CPT | Performed by: INTERNAL MEDICINE

## 2024-12-20 PROCEDURE — 84100 ASSAY OF PHOSPHORUS: CPT | Performed by: HOSPITALIST

## 2024-12-20 PROCEDURE — 25010000002 FUROSEMIDE PER 20 MG: Performed by: INTERNAL MEDICINE

## 2024-12-20 PROCEDURE — 97165 OT EVAL LOW COMPLEX 30 MIN: CPT

## 2024-12-20 RX ORDER — POTASSIUM CHLORIDE 750 MG/1
40 CAPSULE, EXTENDED RELEASE ORAL EVERY 4 HOURS
Status: COMPLETED | OUTPATIENT
Start: 2024-12-20 | End: 2024-12-20

## 2024-12-20 RX ORDER — MAGNESIUM SULFATE HEPTAHYDRATE 40 MG/ML
2 INJECTION, SOLUTION INTRAVENOUS ONCE
Status: COMPLETED | OUTPATIENT
Start: 2024-12-20 | End: 2024-12-20

## 2024-12-20 RX ORDER — BACLOFEN 10 MG/1
20 TABLET ORAL 3 TIMES DAILY PRN
Status: DISCONTINUED | OUTPATIENT
Start: 2024-12-20 | End: 2025-01-02

## 2024-12-20 RX ADMIN — INSULIN LISPRO 2 UNITS: 100 INJECTION, SOLUTION INTRAVENOUS; SUBCUTANEOUS at 21:57

## 2024-12-20 RX ADMIN — POTASSIUM CHLORIDE 40 MEQ: 750 CAPSULE, EXTENDED RELEASE ORAL at 10:04

## 2024-12-20 RX ADMIN — APIXABAN 5 MG: 5 TABLET, FILM COATED ORAL at 21:57

## 2024-12-20 RX ADMIN — INSULIN GLARGINE 10 UNITS: 100 INJECTION, SOLUTION SUBCUTANEOUS at 10:05

## 2024-12-20 RX ADMIN — MAGNESIUM SULFATE HEPTAHYDRATE 2 G: 40 INJECTION, SOLUTION INTRAVENOUS at 12:03

## 2024-12-20 RX ADMIN — TRAMADOL HYDROCHLORIDE 100 MG: 50 TABLET, COATED ORAL at 00:09

## 2024-12-20 RX ADMIN — HYDROMORPHONE HYDROCHLORIDE 0.5 MG: 1 INJECTION, SOLUTION INTRAMUSCULAR; INTRAVENOUS; SUBCUTANEOUS at 09:38

## 2024-12-20 RX ADMIN — METOLAZONE 10 MG: 5 TABLET ORAL at 08:30

## 2024-12-20 RX ADMIN — TRAMADOL HYDROCHLORIDE 100 MG: 50 TABLET, COATED ORAL at 06:30

## 2024-12-20 RX ADMIN — Medication 10 ML: at 08:31

## 2024-12-20 RX ADMIN — BACLOFEN 20 MG: 10 TABLET ORAL at 17:35

## 2024-12-20 RX ADMIN — APIXABAN 5 MG: 5 TABLET, FILM COATED ORAL at 08:30

## 2024-12-20 RX ADMIN — WHITE PETROLATUM 1 APPLICATION: 1.75 OINTMENT TOPICAL at 08:32

## 2024-12-20 RX ADMIN — SERTRALINE 50 MG: 50 TABLET, FILM COATED ORAL at 22:03

## 2024-12-20 RX ADMIN — INSULIN LISPRO 2 UNITS: 100 INJECTION, SOLUTION INTRAVENOUS; SUBCUTANEOUS at 12:04

## 2024-12-20 RX ADMIN — INSULIN LISPRO 2 UNITS: 100 INJECTION, SOLUTION INTRAVENOUS; SUBCUTANEOUS at 17:31

## 2024-12-20 RX ADMIN — ATORVASTATIN CALCIUM 10 MG: 10 TABLET, FILM COATED ORAL at 22:03

## 2024-12-20 RX ADMIN — DIGOXIN 125 MCG: 250 TABLET ORAL at 12:01

## 2024-12-20 RX ADMIN — TRAMADOL HYDROCHLORIDE 100 MG: 50 TABLET, COATED ORAL at 17:35

## 2024-12-20 RX ADMIN — WHITE PETROLATUM 1 APPLICATION: 1.75 OINTMENT TOPICAL at 22:48

## 2024-12-20 RX ADMIN — Medication 10 ML: at 22:00

## 2024-12-20 RX ADMIN — BACLOFEN 10 MG: 10 TABLET ORAL at 06:30

## 2024-12-20 RX ADMIN — POTASSIUM CHLORIDE 40 MEQ: 750 CAPSULE, EXTENDED RELEASE ORAL at 12:01

## 2024-12-20 RX ADMIN — SODIUM CHLORIDE 125 MG: 9 INJECTION, SOLUTION INTRAVENOUS at 10:05

## 2024-12-20 RX ADMIN — Medication 5 MG: at 21:57

## 2024-12-20 RX ADMIN — INSULIN LISPRO 2 UNITS: 100 INJECTION, SOLUTION INTRAVENOUS; SUBCUTANEOUS at 08:30

## 2024-12-20 RX ADMIN — ACETAMINOPHEN 650 MG: 325 TABLET ORAL at 21:57

## 2024-12-20 RX ADMIN — FUROSEMIDE 40 MG: 10 INJECTION, SOLUTION INTRAMUSCULAR; INTRAVENOUS at 16:16

## 2024-12-20 RX ADMIN — BUPROPION HYDROCHLORIDE 150 MG: 150 TABLET, EXTENDED RELEASE ORAL at 08:30

## 2024-12-20 RX ADMIN — METOPROLOL SUCCINATE 12.5 MG: 25 TABLET, EXTENDED RELEASE ORAL at 08:30

## 2024-12-20 NOTE — PLAN OF CARE
Goal Outcome Evaluation:              Outcome Evaluation: pt transferred to 4NT this shift. a&o x4. vss on room air. pt refuses turns, skin, and wound care with the exception of the silver foam applied to the right leg only. pt educated on importance of turns and skin care. frequently c/o pain, medicated PRN per MAR. no new issues/needs at this time.

## 2024-12-20 NOTE — THERAPY EVALUATION
Patient Name: Jose Shaikh  : 1960    MRN: [id number]                              Today's Date: 2024       Admit Date: 2024    Visit Dx:     ICD-10-CM ICD-9-CM   1. Hypervolemia, unspecified hypervolemia type  E87.70 276.69   2. Congestive heart failure, unspecified HF chronicity, unspecified heart failure type  I50.9 428.0   3. Suicidal ideation  R45.851 V62.84     Patient Active Problem List   Diagnosis    Diabetic ulcer of left heel associated with type 2 DM    Acute osteomyelitis of left calcaneus     Diabetic ulcer of left heel associated with type 2 DM    Diabetic ulcer of right midfoot associated with type 2 DM    Paroxysmal atrial fibrillation    Essential hypertension    Hyperlipidemia LDL goal <100    Cellulitis and abscess of foot    High alkaline phosphatase level    Osteomyelitis    Onychomycosis    Onychocryptosis    Foot pain, bilateral    Osteomyelitis of foot, right, acute    Cellulitis of right foot    Type 2 diabetes mellitus, with long-term current use of insulin    Class 3 severe obesity due to excess calories with serious comorbidity and body mass index (BMI) of 45.0 to 49.9 in adult    Anxiety disorder, unspecified    Claustrophobia    Dependence on wheelchair    Depression, unspecified    Long term (current) use of anticoagulants    Long term (current) use of oral hypoglycemic drugs    Wound of foot    Ulcer of right foot    Orthostatic hypotension    Other chronic osteomyelitis, right ankle and foot    Personal history of nicotine dependence    Thrombocytopenia, unspecified    Unspecified open wound, right foot, initial encounter    Diabetic foot infection    Subacute osteomyelitis of right foot    Right foot pain    Sepsis    Onychomycosis    Foot pain, left    COVID-19 virus detected    Absence of toe of right foot    Corns and callosity    Disability of walking    Fracture    Limb swelling    Polyneuropathy    Pressure ulcer, stage 1    Shortness of breath     Generalized weakness    Debility    Onychomycosis    Noncompliance of patient with dietary regimen    Morbid obesity    COVID-19 virus infection    COVID-19    CHF exacerbation     Past Medical History:   Diagnosis Date    Absence of toe of right foot     Acute osteomyelitis of left calcaneus  08/18/2021    Anxiety and depression     Arthritis     Cancer     Chronic pain     STATES HIS PAIN IS 10/10 AAT    Claustrophobia     Corns and callus     Diabetic ulcer of left heel associated with type 2 DM 08/18/2021    Diabetic ulcer of left heel associated with type 2 DM 07/06/2021    Diabetic ulcer of right midfoot associated with type 2 DM 08/18/2021    Difficulty walking     Essential hypertension 08/31/2021    Hammertoe     Hyperlipidemia LDL goal <100 08/31/2021    Ingrown toenail     Obesity     Paroxysmal atrial fibrillation 08/31/2021    Polyneuropathy     Pressure ulcer, stage 1     Tinea unguium     Type 2 diabetes mellitus with polyneuropathy      Past Surgical History:   Procedure Laterality Date    CYST REMOVAL      center of back; benign    EYE SURGERY      INCISION AND DRAINAGE ABSCESS      back    INCISION AND DRAINAGE LEG Right 12/10/2021    Procedure: INCISION AND DRAINAGE LOWER EXTREMITY;  Surgeon: Ash Leyva DPM;  Location: [facility] OR;  Service: Podiatry;  Laterality: Right;    OTHER SURGICAL HISTORY      Surgical clips left foot    TOE SURGERY Right     Removal of 5th toe    TRANS METATARSAL AMPUTATION Right 12/02/2021    Procedure: AMPUTATION TRANS METATARSAL;  Surgeon: Ash Leyva DPM;  Location: [facility] OR;  Service: Podiatry;  Laterality: Right;    VASCULAR SURGERY      WRIST SURGERY Left     repair of injury      General Information       Row Name 12/20/24 0923          OT Time and Intention    Document Type evaluation  -PG     Mode of Treatment individual therapy;occupational therapy  -PG       Row Name 12/20/24 0923          General Information    Prior Level of  Function dependent:;ADL's  -PG     Existing Precautions/Restrictions no known precautions/restrictions  -PG     Barriers to Rehab none identified  -PG       Row Name 12/20/24 0923          Occupational Profile    Reason for Services/Referral (Occupational Profile) Patient is a 64-year-old male admitted for suicidal ideation and CHF exacerbation.  Patient is being evaluated by Occupational Therapy due to recent decline in ADL function.  No previous OT services identified  -PG       Row Name 12/20/24 0923          Living Environment    People in Home alone  -PG       Row Name 12/20/24 0923          Cognition    Orientation Status (Cognition) oriented x 4  -PG               User Key  (r) = Recorded By, (t) = Taken By, (c) = Cosigned By      Initials Name Provider Type    PG Kodak Matos OT Occupational Therapist                     Mobility/ADL's       Row Name 12/20/24 0933          Bed Mobility    Bed Mobility bed mobility (all) activities  -PG     All Activities, [place] (Bed Mobility) dependent (less than 25% patient effort);maximum assist (25% patient effort)  -PG       Row Name 12/20/24 0933          Activities of Daily Living    BADL Assessment/Intervention --  Independent upper body, lower body dependent  -PG               User Key  (r) = Recorded By, (t) = Taken By, (c) = Cosigned By      Initials Name Provider Type    PG Kodak Matos, OT Occupational Therapist                   Obj/Interventions    No documentation.                  Goals/Plan    No documentation.                  Clinical Impression       Row Name 12/20/24 0934          Pain Assessment    Pretreatment Pain Rating 0/10 - no pain  -PG     Posttreatment Pain Rating 0/10 - no pain  -PG       Row Name 12/20/24 0934          Plan of Care Review    Plan of Care Reviewed With patient  -PG     Progress no change  -PG     Outcome Evaluation Patient is currently bedbound and dependent for self-care/toileting activities.  No further OT services  recommended at this time  -PG       Row Name 12/20/24 0934          Therapy Assessment/Plan (OT)    Criteria for Skilled Therapeutic Interventions Met (OT) no;does not meet criteria for skilled intervention  -PG     Therapy Frequency (OT) evaluation only  -PG       Row Name 12/20/24 0934          Therapy Plan Review/Discharge Plan (OT)    Anticipated Discharge Disposition (OT) home  -PG               User Key  (r) = Recorded By, (t) = Taken By, (c) = Cosigned By      Initials Name Provider Type    PG Kodak Matos, BEKAH Occupational Therapist                   Outcome Measures       Row Name 12/20/24 0934          How much help from another is currently needed...    Putting on and taking off regular lower body clothing? 1  -PG     Bathing (including washing, rinsing, and drying) 2  -PG     Toileting (which includes using toilet bed pan or urinal) 1  -PG     Putting on and taking off regular upper body clothing 4  -PG     Taking care of personal grooming (such as brushing teeth) 4  -PG     Eating meals 4  -PG     AM-PAC 6 Clicks Score (OT) 16  -PG       Row Name 12/20/24 0009          How much help from another person do you currently need...    Turning from your back to your side while in flat bed without using bedrails? 2  -LB     Moving from lying on back to sitting on the side of a flat bed without bedrails? 1  -LB     Moving to and from a bed to a chair (including a wheelchair)? 1  -LB     Standing up from a chair using your arms (e.g., wheelchair, bedside chair)? 1  -LB     Climbing 3-5 steps with a railing? 1  -LB     To walk in hospital room? 1  -LB     AM-PAC 6 Clicks Score (PT) 7  -LB       Row Name 12/20/24 0934          Functional Assessment    Outcome Measure Options AM-PAC 6 Clicks Daily Activity (OT);Optimal Instrument  -PG       Row Name 12/20/24 0934          Optimal Instrument    Optimal Instrument Optimal - 3  -PG     Bending/Stooping 5  -PG     Standing 5  -PG     Reaching 1  -PG     From the list,  choose the 3 activities you would most like to be able to do without any difficulty Bending/stooping;Standing;Reaching  -PG     Total Score Optimal - 3 11  -PG               User Key  (r) = Recorded By, (t) = Taken By, (c) = Cosigned By      Initials Name Provider Type    Kodak Velazco OT Occupational Therapist    Rin Gleason RN Registered Nurse                    Occupational Therapy Education        No education to display                  OT Recommendation and Plan  Therapy Frequency (OT): evaluation only  Plan of Care Review  Plan of Care Reviewed With: patient  Progress: no change  Outcome Evaluation: Patient is currently bedbound and dependent for self-care/toileting activities.  No further OT services recommended at this time     Time Calculation:   Evaluation Complexity (OT)  Review Occupational Profile/Medical/Therapy History Complexity: brief/low complexity  Assessment, Occupational Performance/Identification of Deficit Complexity: 1-3 performance deficits  Clinical Decision Making Complexity (OT): problem focused assessment/low complexity  Overall Complexity of Evaluation (OT): low complexity     Time Calculation- OT       Row Name 12/20/24 0936             Time Calculation- OT    OT Received On 12/20/24  -PG         Untimed Charges    OT Eval/Re-eval Minutes 30  -PG         Total Minutes    Untimed Charges Total Minutes 30  -PG       Total Minutes 30  -PG                User Key  (r) = Recorded By, (t) = Taken By, (c) = Cosigned By      Initials Name Provider Type    PG Kodak Matos OT Occupational Therapist                  Therapy Charges for Today       Code Description Service Date Service Provider Modifiers Qty    75303643383 HC OT EVAL LOW COMPLEXITY 2 12/20/2024 Kodak Matos OT GO 1                 Kodak Matos OT  12/20/2024

## 2024-12-20 NOTE — CONSULTS
"Nutrition Services    Patient Name: Jose Shaikh  YOB: 1960  MRN: [id number]  Admission date: 12/18/2024      CLINICAL NUTRITION ASSESSMENT      Reason for Assessment  Physician Consult-Skin Breakdown     H&P:  Past Medical History:   Diagnosis Date    Absence of toe of right foot     Acute osteomyelitis of left calcaneus  08/18/2021    Anxiety and depression     Arthritis     Cancer     Chronic pain     STATES HIS PAIN IS 10/10 AAT    Claustrophobia     Corns and callus     Diabetic ulcer of left heel associated with type 2 DM 08/18/2021    Diabetic ulcer of left heel associated with type 2 DM 07/06/2021    Diabetic ulcer of right midfoot associated with type 2 DM 08/18/2021    Difficulty walking     Essential hypertension 08/31/2021    Hammertoe     Hyperlipidemia LDL goal <100 08/31/2021    Ingrown toenail     Obesity     Paroxysmal atrial fibrillation 08/31/2021    Polyneuropathy     Pressure ulcer, stage 1     Tinea unguium     Type 2 diabetes mellitus with polyneuropathy         Current Problems:   Active Hospital Problems    Diagnosis     **CHF exacerbation         Nutrition/Diet History         Narrative   MD consult for skin breakdown. Pt has an excellent appetite and reports some weight loss, due to fluid. Admission wt of 411 lbs and BMI >50. Pt does not like supplements and refused Anatoly. Recommend double protein at meals to prevent further skin breakdown. RD to follow per protocol.      Anthropometrics        Current Height, Weight Height: 188 cm (74\")  Weight: (!) 187 kg (411 lb 13.1 oz)   Current BMI Body mass index is 52.87 kg/m².   BMI Classification Obese Class III   % % (82.2 kg)    Adjusted Body Weight (ABW) 124.1 kg   Weight Hx  Wt Readings from Last 30 Encounters:   12/19/24 0202 (!) 187 kg (411 lb 13.1 oz)   12/18/24 1455 (!) 195 kg (430 lb 12.5 oz)   12/15/24 1912 (!) 199 kg (438 lb 11.5 oz)   12/15/24 1846 (!) 199 kg (439 lb 6 oz)   12/12/24 0730 (!) 175 kg (384 lb " 14.8 oz)   12/06/24 2226 (!) 177 kg (389 lb 12.4 oz)   11/30/24 1436 (!) 174 kg (383 lb 9.6 oz)   11/25/24 1600 (!) 166 kg (365 lb 15.4 oz)   11/19/24 1534 (!) 167 kg (369 lb)   11/08/24 1527 (!) 168 kg (369 lb 11.4 oz)   10/26/24 0615 (!) 167 kg (368 lb 2.7 oz)   10/26/24 0609 (!) 155 kg (341 lb 11.4 oz)   10/09/24 0950 (!) 155 kg (341 lb 7.9 oz)   10/02/24 0805 (!) 150 kg (330 lb 0.5 oz)   08/05/24 0732 (!) 158 kg (348 lb 15.8 oz)   07/22/24 0800 (!) 156 kg (344 lb 9.3 oz)   07/08/24 0900 (!) 156 kg (343 lb 14.7 oz)   06/27/24 0745 (!) 156 kg (343 lb 4.1 oz)   05/31/24 1400 (!) 151 kg (333 lb 12.4 oz)   05/22/24 1000 (!) 157 kg (346 lb 2 oz)   05/15/24 0545 (!) 153 kg (338 lb 3 oz)   05/08/24 1100 (!) 154 kg (340 lb 6.2 oz)   05/08/24 1029 (!) 154 kg (340 lb 6.2 oz)   05/01/24 1100 (!) 156 kg (343 lb 14.7 oz)   04/24/24 0900 (!) 159 kg (350 lb 1.5 oz)   04/17/24 1124 (!) 161 kg (355 lb 9.6 oz)   04/11/24 1509 (!) 162 kg (356 lb 11.3 oz)   04/02/24 1118 (!) 157 kg (345 lb 0.3 oz)   03/26/24 1003 (!) 143 kg (314 lb 2.5 oz)   03/18/24 1323 (!) 151 kg (332 lb 3.7 oz)   03/18/24 0500 (!) 171 kg (378 lb 1.4 oz)   03/17/24 0500 (!) 173 kg (380 lb 15.3 oz)   03/16/24 0500 (!) 171 kg (376 lb 15.8 oz)   03/15/24 0500 (!) 161 kg (354 lb 8 oz)   03/14/24 0300 (!) 173 kg (382 lb 4.4 oz)   03/12/24 0500 (!) 158 kg (347 lb 14.2 oz)   03/11/24 0500 (!) 153 kg (337 lb 8.4 oz)   03/10/24 0540 (!) 154 kg (339 lb 4.6 oz)   03/09/24 0500 (!) 153 kg (337 lb 1.3 oz)   03/08/24 1323 (!) 153 kg (337 lb 8 oz)   03/08/24 0500 (!) 157 kg (346 lb 2 oz)   03/06/24 2300 (!) 155 kg (342 lb 2.5 oz)   03/05/24 0415 (!) 155 kg (342 lb 13 oz)   03/04/24 0500 (!) 156 kg (344 lb 9.3 oz)   03/03/24 0500 (!) 156 kg (344 lb 9.3 oz)   03/02/24 0530 (!) 157 kg (346 lb 12.5 oz)   03/01/24 0500 (!) 157 kg (345 lb 3.9 oz)   02/29/24 0500 (!) 157 kg (347 lb 3.6 oz)   02/28/24 0509 (!) 158 kg (347 lb 14.2 oz)   02/27/24 0500 (!) 159 kg (351 lb 3.1 oz)    02/26/24 0500 (!) 159 kg (350 lb 12 oz)   02/25/24 0500 (!) 160 kg (351 lb 10.1 oz)   02/24/24 0500 (!) 160 kg (352 lb 1.2 oz)   02/23/24 0500 (!) 160 kg (353 lb 6.4 oz)   02/22/24 0500 (!) 160 kg (353 lb 13.4 oz)   02/21/24 0514 (!) 162 kg (356 lb 7.7 oz)   02/20/24 0500 (!) 161 kg (355 lb 2.6 oz)   02/19/24 0300 (!) 160 kg (353 lb 6.4 oz)   02/18/24 0500 (!) 162 kg (356 lb 7.7 oz)   02/17/24 0500 (!) 162 kg (357 lb 2.3 oz)   02/16/24 0500 (!) 162 kg (358 lb 0.4 oz)   02/15/24 0532 (!) 163 kg (360 lb 3.7 oz)   02/14/24 0600 (!) 162 kg (357 lb 5.9 oz)   02/13/24 0500 (!) 162 kg (357 lb 9.4 oz)   02/12/24 1352 (!) 160 kg (352 lb 4.7 oz)   02/12/24 0500 (!) 166 kg (367 lb 1.1 oz)   02/11/24 0500 (!) 169 kg (372 lb 2.2 oz)   02/10/24 0500 (!) 168 kg (370 lb 9.5 oz)   02/09/24 0600 (!) 168 kg (370 lb 9.5 oz)   02/08/24 0600 (!) 165 kg (363 lb 15.7 oz)   02/07/24 0600 (!) 166 kg (366 lb 10 oz)   02/06/24 0419 (!) 166 kg (366 lb 10 oz)   02/05/24 0500 (!) 167 kg (367 lb 4.6 oz)   02/04/24 0500 (!) 167 kg (367 lb 8.1 oz)   02/03/24 0500 (!) 166 kg (366 lb 6.5 oz)   02/02/24 0500 (!) 168 kg (369 lb 14.9 oz)   02/01/24 0613 (!) 169 kg (372 lb 5.7 oz)   01/31/24 0500 (!) 168 kg (371 lb 4.1 oz)   01/30/24 0500 (!) 169 kg (372 lb 12.8 oz)   01/29/24 0232 (!) 169 kg (372 lb 5.7 oz)   01/28/24 0500 (!) 167 kg (368 lb 6.2 oz)   01/26/24 0400 (!) 169 kg (372 lb 2.2 oz)   01/25/24 0417 (!) 167 kg (368 lb 9.8 oz)   01/24/24 0608 (!) 168 kg (371 lb 7.6 oz)   01/23/24 0500 (!) 168 kg (369 lb 14.9 oz)   01/22/24 0500 (!) 168 kg (371 lb 4.1 oz)   01/21/24 0500 (!) 168 kg (371 lb 4.1 oz)   01/20/24 0500 (!) 168 kg (371 lb 7.6 oz)   01/19/24 0500 (!) 171 kg (376 lb 1.7 oz)   01/18/24 0500 (!) 172 kg (380 lb 1.2 oz)   01/17/24 0614 (!) 172 kg (379 lb 6.6 oz)   01/16/24 0500 (!) 171 kg (378 lb 1.4 oz)   01/15/24 0500 (!) 169 kg (372 lb 2.2 oz)   01/14/24 0546 (!) 169 kg (373 lb 7.4 oz)   01/13/24 0507 (!) 171 kg (376 lb 5.2 oz)   01/12/24  0600 (!) 170 kg (374 lb 12.5 oz)   01/11/24 0600 (!) 169 kg (371 lb 14.7 oz)   01/10/24 0600 (!) 169 kg (371 lb 11.1 oz)   01/09/24 0500 (!) 168 kg (370 lb 9.5 oz)   01/08/24 0448 (!) 168 kg (370 lb 9.5 oz)   01/07/24 0454 (!) 167 kg (367 lb 15.2 oz)   01/06/24 0500 (!) 166 kg (366 lb 10 oz)   01/05/24 0555 (!) 165 kg (364 lb 6.7 oz)   01/04/24 0500 (!) 165 kg (363 lb 12.1 oz)   01/03/24 0500 (!) 166 kg (365 lb 1.3 oz)   01/02/24 0500 (!) 167 kg (367 lb 4.6 oz)   01/01/24 0500 (!) 166 kg (365 lb 11.9 oz)   12/31/23 0500 (!) 160 kg (352 lb 4.7 oz)   12/30/23 0500 (!) 167 kg (367 lb 15.2 oz)   12/29/23 0500 (!) 166 kg (366 lb 10 oz)   12/28/23 0500 (!) 165 kg (364 lb 13.8 oz)   12/27/23 0500 (!) 166 kg (367 lb 1.1 oz)   12/26/23 0500 (!) 167 kg (367 lb 4.6 oz)   12/25/23 0500 (!) 165 kg (364 lb 3.2 oz)   12/24/23 0500 (!) 164 kg (362 lb 7 oz)   12/23/23 0500 (!) 163 kg (360 lb 0.2 oz)   12/22/23 0600 (!) 163 kg (358 lb 14.5 oz)   12/21/23 0500 (!) 163 kg (359 lb 12.7 oz)   12/20/23 0500 (!) 162 kg (357 lb 9.4 oz)   12/19/23 0550 (!) 163 kg (359 lb 5.6 oz)   12/18/23 0500 (!) 161 kg (355 lb 13.2 oz)   12/17/23 0500 (!) 160 kg (353 lb 13.4 oz)   12/16/23 1541 (!) 160 kg (353 lb 3.2 oz)   12/16/23 0500 (!) 165 kg (364 lb 13.8 oz)   12/15/23 0500 (!) 165 kg (362 lb 10.5 oz)   12/14/23 0500 (!) 157 kg (345 lb 14.4 oz)   12/13/23 0500 (!) 153 kg (337 lb 4.9 oz)   12/12/23 0500 (!) 155 kg (341 lb 0.8 oz)   12/11/23 1049 (!) 155 kg (341 lb 0.8 oz)   12/11/23 0500 (!) 160 kg (353 lb 13.4 oz)   12/10/23 0532 (!) 162 kg (356 lb 7.7 oz)   12/09/23 0500 (!) 151 kg (332 lb 10.8 oz)   12/08/23 0500 (!) 153 kg (336 lb 13.8 oz)   12/06/23 0500 (!) 154 kg (340 lb 6.2 oz)   12/05/23 0607 (!) 161 kg (354 lb 15.1 oz)   12/04/23 0500 (!) 161 kg (354 lb 4.5 oz)   12/03/23 0400 (!) 161 kg (354 lb 11.5 oz)   11/21/23 1155 (!) 162 kg (357 lb 12.8 oz)   11/09/23 0500 (!) 160 kg (353 lb 9.9 oz)   11/06/23 1422 (!) 158 kg (348 lb 5.2 oz)    11/02/23 1155 (!) 158 kg (348 lb 15.8 oz)   09/11/23 0030 (!) 151 kg (334 lb)   09/10/23 1844 (!) 154 kg (338 lb 10 oz)   08/30/23 1112 (!) 157 kg (347 lb)   08/01/23 0932 (!) 158 kg (347 lb 10.7 oz)   07/31/23 2347 (!) 163 kg (358 lb 7.5 oz)   06/16/23 2333 (!) 155 kg (342 lb 2.5 oz)   06/16/23 1053 (!) 155 kg (342 lb 3.2 oz)   06/15/23 0505 (!) 149 kg (328 lb 14.4 oz)   06/14/23 0455 (!) 149 kg (328 lb 4.8 oz)   06/13/23 1703 (!) 149 kg (328 lb 11.2 oz)   06/13/23 0600 (!) 170 kg (374 lb 12.5 oz)   06/12/23 0420 (!) 160 kg (352 lb 11.8 oz)   06/11/23 0447 (!) 150 kg (331 lb 5.6 oz)   06/10/23 0500 (!) 150 kg (330 lb 14.6 oz)   06/09/23 0536 (!) 156 kg (343 lb 7.6 oz)   06/08/23 1826 (!) 156 kg (344 lb 11.2 oz)   06/08/23 0522 (!) 158 kg (348 lb 8.8 oz)   06/07/23 0548 (!) 155 kg (342 lb 9.5 oz)   06/06/23 0515 (!) 155 kg (341 lb 14.9 oz)   06/05/23 0445 (!) 155 kg (341 lb 9.6 oz)   06/05/23 0348 (!) 158 kg (349 lb 3.3 oz)   06/04/23 0555 (!) 157 kg (346 lb 3.2 oz)   06/03/23 0539 (!) 158 kg (349 lb)   06/02/23 0548 (!) 163 kg (359 lb 3.2 oz)   06/01/23 0600 (!) 164 kg (362 lb)   05/31/23 0507 (!) 164 kg (362 lb 4.8 oz)   05/30/23 0635 (!) 164 kg (362 lb 7 oz)   05/29/23 0500 (!) 167 kg (368 lb 2.7 oz)   05/28/23 0600 (!) 167 kg (367 lb 11.6 oz)   05/27/23 0600 (!) 167 kg (369 lb 0.8 oz)   05/26/23 0529 (!) 167 kg (369 lb 3.2 oz)   05/25/23 0600 (!) 168 kg (370 lb 3.2 oz)   05/24/23 0600 (!) 166 kg (366 lb 9.6 oz)   05/22/23 0529 (!) 169 kg (373 lb 7.4 oz)   05/21/23 0600 (!) 169 kg (371 lb 12.8 oz)   05/20/23 0600 (!) 171 kg (376 lb 5.2 oz)   05/19/23 0300 (!) 168 kg (370 lb 14.4 oz)   05/18/23 1912 (!) 168 kg (371 lb 7.6 oz)   05/18/23 0600 (!) 169 kg (371 lb 11.1 oz)   05/16/23 0700 (!) 171 kg (377 lb 4.8 oz)   05/14/23 0500 (!) 171 kg (377 lb 3.3 oz)   05/12/23 1143 (!) 170 kg (375 lb)   05/06/23 0258 (!) 170 kg (375 lb 8 oz)   04/19/23 0909 (!) 163 kg (359 lb)   04/03/23 1906 (!) 168 kg (370 lb)   03/27/23  0938 (!) 170 kg (373 lb 10.9 oz)   03/17/23 1153 (!) 168 kg (370 lb)   01/27/23 1501 (!) 168 kg (370 lb)   12/22/22 1501 (!) 171 kg (376 lb)   11/08/22 1035 (!) 161 kg (355 lb)   10/01/22 1141 (!) 164 kg (360 lb 10.8 oz)   05/18/22 1311 (!) 155 kg (341 lb)   03/24/22 1432 (!) 155 kg (341 lb)   03/02/22 1412 (!) 155 kg (341 lb)   01/12/22 1317 (!) 155 kg (341 lb)          Wt Change Observation      Estimated/Assessed Needs  Estimated Needs based on: Ideal Body Weight       Energy Requirements 25-30 kcal/kg   EST Needs (kcal/day) 4725-1998 kcal        Protein Requirements 1.2-1.5 g.kg   EST Daily Needs (g/day)  g       Fluid Requirements 25-30 ml/kg    Estimated Needs (mL/day) 2067-5204 ml      Labs/Medications         Pertinent Labs Reviewed.   Results from last 7 days   Lab Units 12/20/24  0519 12/19/24  0423 12/18/24  1634 12/15/24  1854   SODIUM mmol/L 131* 134* 133* 137   POTASSIUM mmol/L 3.0* 3.5 4.4 4.0   CHLORIDE mmol/L 97* 100 102 106   CO2 mmol/L 28.7 26.1 26.0 24.3   BUN mg/dL 6* 6* 7* 8   CREATININE mg/dL 0.36* 0.43* 0.43* 0.32*   CALCIUM mg/dL 7.5* 7.8* 7.9* 7.9*   BILIRUBIN mg/dL  --   --  1.5* 0.8   ALK PHOS U/L  --   --  126* 126*   ALT (SGPT) U/L  --   --  12 14   AST (SGOT) U/L  --   --  21 24   GLUCOSE mg/dL 204* 175* 194* 199*     Results from last 7 days   Lab Units 12/20/24  0519 12/19/24 0423   MAGNESIUM mg/dL 1.6 1.5*   PHOSPHORUS mg/dL 2.8 2.1*   HEMOGLOBIN g/dL 8.2* 8.7*   HEMATOCRIT % 28.5* 30.6*     COVID19   Date Value Ref Range Status   10/14/2024 Detected (C) Not Detected - Ref. Range Final     Lab Results   Component Value Date    HGBA1C 4.40 (L) 08/28/2024         Pertinent Medications Reviewed.     Malnutrition Severity Assessment              Nutrition Diagnosis         Nutrition Dx Problem 1 Overweight/Obesity related to  excessive energy intake  as evidenced by  BMI >50     Nutrition Intervention           Current Nutrition Orders & Evaluation of Intake       Current PO Diet  Diet: Cardiac, Diabetic, Fluid Restriction (240 mL/tray); Low Sodium (2g); Consistent Carbohydrate; 2000 mL/day; Fluid Consistency: Thin (IDDSI 0)   Supplement No active supplement orders           Nutrition Intervention/Prescription        Continue Cardiac, Diabetic diet as tolerated  Double protein at meals (prevent further skin breakdown)        Medical Nutrition Therapy/Nutrition Education          Learner     Readiness Patient  Acceptance     Method     Response Explanation  Verbalizes understanding     Monitor/Evaluation        Monitor Per protocol, I&O, PO intake, GI status     Nutrition Discharge Plan         To be determined     Electronically signed by:  Terry Maloney RD  12/20/24 14:32 EST

## 2024-12-20 NOTE — PLAN OF CARE
Goal Outcome Evaluation:              Outcome Evaluation: VSS, A&O x4, Medicated for pain, see MAR. Potassium Phos, Magnesium and Iron given IV today. Patient non compliant with turns and skin care. Report given to 4NT nurse David for transport.

## 2024-12-20 NOTE — PLAN OF CARE
Goal Outcome Evaluation:  Plan of Care Reviewed With: patient        Progress: no change  Outcome Evaluation: pt aox4 on room air. VSS. BG monitored. skin and wound care performed as ordered. specialty mattress, pt refusing most turns this shift, educated on the importance. falls precautions in place. call light in reach. medicated PRN for c/o muscle spams and pain per the MAR. peralta cath placed by urologist this shift. no complaints at this time

## 2024-12-20 NOTE — PROGRESS NOTES
[facility]   Hospitalist Progress Note  Date: 2024  Patient Name: Jose Shaikh  : 1960  MRN: [id number]  Date of admission: 2024      Subjective   Subjective     Chief Complaint: Lower extremity swelling    Summary: 64-year-old man who presents to the emergency room for worsening lower extremity swelling, shortness of breath, and suicidal ideation.  Patient also states that he does not have anybody at home to take care of him.  He has chronic leg wounds.  He has a history of diabetes atrial fibrillation ulcers.  The patient states that he is going to take all his muscle relaxants to kill himself because he cannot go on living like this.     Interval Followup: No acute events overnight.  He has been intermittently declining wound care and turns.  Has excoriations and skin breakdown in his groin, King catheter was attempted overnight but was unable to be placed by nursing.  Still with significant lower extremity swelling and swelling in his groin.    Objective   Objective     Vitals:   Temp:  [97.2 °F (36.2 °C)-97.9 °F (36.6 °C)] 97.9 °F (36.6 °C)  Heart Rate:  [79-87] 79  Resp:  [18] 18  BP: ()/(61-86) 97/62  Physical Exam    Constitutional: Chronically ill-appearing male, obese, awake, alert, no acute distress   Respiratory: Difficult to assess due to body habitus clear to auscultation bilaterally, nonlabored respirations    Cardiovascular: RRR, no MRG   Gastrointestinal: Positive bowel sounds, soft, NTND   Neurologic: Oriented x 3, strength symmetric in all extremities, Cranial Nerves grossly intact to confrontation, speech clear    Result Review    I have personally reviewed the results below:  [x]  Laboratory personally reviewed BMP, CBC, magnesium, phosphorus  []  Microbiology  []  Radiology  []  EKG/Telemetry   []  Cardiology/Vascular   []  Pathology  []  Old records  []  Other:  CBC          2024    16:34 2024    04:23 2024    05:19   CBC   WBC 7.44  7.41   5.44    RBC 4.65  4.46  4.20    Hemoglobin 9.0  8.7  8.2    Hematocrit 32.4  30.6  28.5    MCV 69.7  68.6  67.9    MCH 19.4  19.5  19.5    MCHC 27.8  28.4  28.8    RDW 19.1  19.1  19.1    Platelets 158  177  161      CMP          12/18/2024    16:34 12/19/2024    04:23 12/20/2024    05:19   CMP   Glucose 194  175  204    BUN 7  6  6    Creatinine 0.43  0.43  0.36    EGFR 117.7  117.7  124.2    Sodium 133  134  131    Potassium 4.4  3.5  3.0    Chloride 102  100  97    Calcium 7.9  7.8  7.5    Total Protein 5.3      Albumin 2.3      Globulin 3.0      Total Bilirubin 1.5      Alkaline Phosphatase 126      AST (SGOT) 21      ALT (SGPT) 12      Albumin/Globulin Ratio 0.8      BUN/Creatinine Ratio 16.3  14.0  16.7    Anion Gap 5.0  7.9  5.3        Assessment & Plan   Assessment / Plan   Acute on chronic diastolic congestive heart failure with acute exacerbation  Suicidal ideation  Chronic bilateral foot wounds  Paroxysmal atrial fibrillation  Type 2 diabetes mellitus  Morbid obesity with BMI of 52  Debility with wheelchair dependence  Osteoarthritis of the hip  Chronic venous stasis  Deconditioning  Hypomagnesemia  Buried penis  Hypophosphatemia  Hemorrhoids  Anasarca    Continue to monitor in the hospital for workup and management of the above  Due to buried penis and inability of nursing staff to place a King catheter, have asked urology to place which they did at bedside  Discussed at length with the patient, he states he will allow full wound care especially to the skin folds of his groin as these will not get better without treatment  Continue Wellbutrin per psychiatry recommendations  Continue Lasix 40 mg IV twice daily, metolazone 10 mg daily  Strict I's and O's, trend renal function electrolytes closely on IV diuretics  Can use Tylenol, baclofen, or Ultram as needed for pain  Wound care following, appreciate assistance  Continue with lower extremity compression  Continue with IV iron transfusion daily x 3 days,  will transition to oral iron thereafter  Right upper extremity ultrasound negative for DVT or superficial thrombophlebitis  Replace potassium orally  Lantus 10 units daily, continue with sliding scale insulin  Continue appropriate home medications  PT/OT/social work consulted  Trend renal function and electrolytes with a.m. BMP, magnesium   Trend Hgb and WBC with a.m. CBC     Discussed plan with RN, urology    VTE Prophylaxis:  Pharmacologic & mechanical VTE prophylaxis orders are present.        CODE STATUS:   Level Of Support Discussed With: Patient  Code Status (Patient has no pulse and is not breathing): CPR (Attempt to Resuscitate)  Medical Interventions (Patient has pulse or is breathing): Full Support

## 2024-12-20 NOTE — CONSULTS
[facility]   UROLOGY Consult Note    Patient Name: Jose Shaikh  : 1960  MRN: [id number]  Primary Care Physician:  Provider, No Known  Referring Physician: No ref. provider found  Date of admission: 2024    Subjective   Subjective     Reason for Consult/ Chief Complaint: Difficult catheter insertion    HPI:  Jose Shaikh is a 64 y.o. male mated for medical management, fluid overload.  Patient also has wounds managed by wound care.  Noted to be incontinent, with lower extremity edema including penile and scrotal edema.  King catheter was ordered, nursing staff unable to place.  Urology consulted for catheter placement.    Patient states that 1 point he lost enough weight to see his penis otherwise has not seen in a long time.    Review of Systems   All systems were reviewed and negative except for: ER H&P and the above    Personal History     Past Medical History:   Diagnosis Date    Absence of toe of right foot     Acute osteomyelitis of left calcaneus  2021    Anxiety and depression     Arthritis     Cancer     Chronic pain     STATES HIS PAIN IS 10/10 AAT    Claustrophobia     Corns and callus     Diabetic ulcer of left heel associated with type 2 DM 2021    Diabetic ulcer of left heel associated with type 2 DM 2021    Diabetic ulcer of right midfoot associated with type 2 DM 2021    Difficulty walking     Essential hypertension 2021    Hammertoe     Hyperlipidemia LDL goal <100 2021    Ingrown toenail     Obesity     Paroxysmal atrial fibrillation 2021    Polyneuropathy     Pressure ulcer, stage 1     Tinea unguium     Type 2 diabetes mellitus with polyneuropathy        Past Surgical History:   Procedure Laterality Date    CYST REMOVAL      center of back; benign    EYE SURGERY      INCISION AND DRAINAGE ABSCESS      back    INCISION AND DRAINAGE LEG Right 12/10/2021    Procedure: INCISION AND DRAINAGE LOWER EXTREMITY;  Surgeon: Ash Leyva  ADEOLA Aleman;  Location: [facility] MAIN OR;  Service: Podiatry;  Laterality: Right;    OTHER SURGICAL HISTORY      Surgical clips left foot    TOE SURGERY Right     Removal of 5th toe    TRANS METATARSAL AMPUTATION Right 12/02/2021    Procedure: AMPUTATION TRANS METATARSAL;  Surgeon: Ash Leyva DPM;  Location: [facility] MAIN OR;  Service: Podiatry;  Laterality: Right;    VASCULAR SURGERY      WRIST SURGERY Left     repair of injury       Family History: family history includes Alcohol abuse in his nephew; Arthritis in his mother; COPD in his nephew; Cancer in his father and sister; Depression in his mother, sister, and sister; Diabetes in his paternal grandmother; Drug abuse in his nephew; Hearing loss in his brother and mother; Heart disease in his brother, father, and mother; Learning disabilities in his nephew; Suicide Attempts in his nephew. Otherwise pertinent FHx was reviewed and not pertinent to current issue.    Social History:  reports that he quit smoking about 34 years ago. His smoking use included cigarettes. He has never used smokeless tobacco. He reports that he does not currently use alcohol. He reports that he does not currently use drugs after having used the following drugs: Marijuana.    Home Medications:  Insulin Lispro (1 Unit Dial), acetaminophen, apixaban, baclofen, bisacodyl, digoxin, empagliflozin, metoprolol succinate XL, polyethylene glycol, sertraline, simvastatin, and spironolactone    Allergies:  Allergies   Allergen Reactions    Adhesive Tape Rash       Objective    Objective     Vitals:   Temp:  [97.2 °F (36.2 °C)-99 °F (37.2 °C)] 97.2 °F (36.2 °C)  Heart Rate:  [79-87] 79  Resp:  [18] 18  BP: (105-124)/(60-86) 121/66    Physical Exam:   No acute distress, BMI 52.87  Lower extremity edema with buried penis, able to palpate meatus   awake alert and oriented  Mood normal; affect normal    Catheter Insertion  Pre-Operative Diagnosis:  Urinary retention, penile edema, buried penis      Post-Operative Diagnosis:  Same     Surgeon/Assistants  Sarah Marin MD     Anesthesia  Other (Viscous lidocaine)     Procedure Performed/Technique  Placement of a King catheter     Specimen/Tissue Removed:  None     Findings:  Patient was prepped in a clean fashion on the hospital kelly.  The generalized edema of the penis, suprapubic region was reduced with manual palpation.  The glans and meatus was then palpable.  Further reduction of the edema was required which eventually expose the glans.  Catheter was then placed without difficulty.  Clear urine returned.  Balloon inflated and catheter secured.  Patient felt much better and shortly after placing the catheter, the bladder distention was resolved     Complications:  None     Estimated Blood Loss:  Estimated Blood Loss:  None             Assessment & Plan   Assessment / Plan     Brief Patient Summary:  Jose Shaikh is a 64 y.o. male with generalized edema including penile edema, buried penis, fluid overload, difficult King catheter secondary to edema    Excessively placed at bedside    Active Hospital Problems:  Active Hospital Problems    Diagnosis     **CHF exacerbation        Plan:   King catheter to gravity    Will defer to hospitalist service regarding appropriate timing of void trial particularly given needs of wound care, lower extremity edema  Likely bladder scan will not be reliable at time of void trial given patient body habitus    Please call with questions    Electronically signed by Sarah Marin MD, 12/20/24, 9:21 AM EST.

## 2024-12-20 NOTE — PLAN OF CARE
Goal Outcome Evaluation:  Plan of Care Reviewed With: patient        Progress: no change  Outcome Evaluation: Patient is currently bedbound and dependent for self-care/toileting activities.  No further OT services recommended at this time    Anticipated Discharge Disposition (OT): home

## 2024-12-20 NOTE — SIGNIFICANT NOTE
PT note:       12/20/24 0900   General Information   Prior Level of Function dependent:;all household mobility  (Pt has been bedbound greater than 1 yr.)   Barriers to Rehab previous functional deficit   Living Environment   Current Living Arrangements home   People in Home   (Pt has paid caregivers)   Plan of Care Review   Outcome Evaluation Pt has been bed bound for greater than a year with multiple admissions and PT evaluations.  Please see prior evaluations in the EMR for more detail.  He is not appropriate for skilled PT services at this time. Recommend positioning to prevent pressures areas and should OOB activity be necessary please use a bariatric lift device for safety.  PT will sign off.   Therapy Assessment/Plan (PT)   Criteria for Skilled Interventions Met (PT) does not meet criteria for skilled intervention   Therapy Plan Review/Discharge Plan (PT)   Anticipated Discharge Disposition (PT) other (see comments)  (Return to prior situation or extended care facility)

## 2024-12-20 NOTE — PLAN OF CARE
PT note;      Pt has been bed bound for greater than a year with multiple admissions and PT evaluations.  Please see prior evaluations in the EMR for more detail.  He is not appropriate for skilled PT services at this time. Recommend positioning to prevent pressures areas and should OOB activity be necessary please use a bariatric lift device for safety.  PT will sign off.

## 2024-12-21 LAB
ANION GAP SERPL CALCULATED.3IONS-SCNC: 9.3 MMOL/L (ref 5–15)
ANISOCYTOSIS BLD QL: NORMAL
BASOPHILS # BLD AUTO: 0.05 10*3/MM3 (ref 0–0.2)
BASOPHILS NFR BLD AUTO: 1 % (ref 0–1.5)
BUN SERPL-MCNC: 7 MG/DL (ref 8–23)
BUN/CREAT SERPL: 15.2 (ref 7–25)
CALCIUM SPEC-SCNC: 7.7 MG/DL (ref 8.6–10.5)
CHLORIDE SERPL-SCNC: 95 MMOL/L (ref 98–107)
CO2 SERPL-SCNC: 27.7 MMOL/L (ref 22–29)
CREAT SERPL-MCNC: 0.46 MG/DL (ref 0.76–1.27)
DEPRECATED RDW RBC AUTO: 45.3 FL (ref 37–54)
EGFRCR SERPLBLD CKD-EPI 2021: 115.4 ML/MIN/1.73
ELLIPTOCYTES BLD QL SMEAR: NORMAL
EOSINOPHIL # BLD AUTO: 0.18 10*3/MM3 (ref 0–0.4)
EOSINOPHIL NFR BLD AUTO: 3.5 % (ref 0.3–6.2)
ERYTHROCYTE [DISTWIDTH] IN BLOOD BY AUTOMATED COUNT: 19.2 % (ref 12.3–15.4)
GLUCOSE BLDC GLUCOMTR-MCNC: 174 MG/DL (ref 70–99)
GLUCOSE BLDC GLUCOMTR-MCNC: 177 MG/DL (ref 70–99)
GLUCOSE BLDC GLUCOMTR-MCNC: 187 MG/DL (ref 70–99)
GLUCOSE BLDC GLUCOMTR-MCNC: 257 MG/DL (ref 70–99)
GLUCOSE SERPL-MCNC: 204 MG/DL (ref 65–99)
HCT VFR BLD AUTO: 27.9 % (ref 37.5–51)
HGB BLD-MCNC: 8 G/DL (ref 13–17.7)
HYPOCHROMIA BLD QL: NORMAL
IMM GRANULOCYTES # BLD AUTO: 0.02 10*3/MM3 (ref 0–0.05)
IMM GRANULOCYTES NFR BLD AUTO: 0.4 % (ref 0–0.5)
LYMPHOCYTES # BLD AUTO: 0.99 10*3/MM3 (ref 0.7–3.1)
LYMPHOCYTES NFR BLD AUTO: 19.1 % (ref 19.6–45.3)
MAGNESIUM SERPL-MCNC: 1.7 MG/DL (ref 1.6–2.4)
MCH RBC QN AUTO: 19.6 PG (ref 26.6–33)
MCHC RBC AUTO-ENTMCNC: 28.7 G/DL (ref 31.5–35.7)
MCV RBC AUTO: 68.2 FL (ref 79–97)
MICROCYTES BLD QL: NORMAL
MONOCYTES # BLD AUTO: 0.63 10*3/MM3 (ref 0.1–0.9)
MONOCYTES NFR BLD AUTO: 12.1 % (ref 5–12)
NEUTROPHILS NFR BLD AUTO: 3.32 10*3/MM3 (ref 1.7–7)
NEUTROPHILS NFR BLD AUTO: 63.9 % (ref 42.7–76)
NRBC BLD AUTO-RTO: 0 /100 WBC (ref 0–0.2)
OVALOCYTES BLD QL SMEAR: NORMAL
PHOSPHATE SERPL-MCNC: 2.9 MG/DL (ref 2.5–4.5)
PLATELET # BLD AUTO: 151 10*3/MM3 (ref 140–450)
PMV BLD AUTO: ABNORMAL FL
POIKILOCYTOSIS BLD QL SMEAR: NORMAL
POTASSIUM SERPL-SCNC: 3.1 MMOL/L (ref 3.5–5.2)
RBC # BLD AUTO: 4.09 10*6/MM3 (ref 4.14–5.8)
SMALL PLATELETS BLD QL SMEAR: NORMAL
SODIUM SERPL-SCNC: 132 MMOL/L (ref 136–145)
WBC MORPH BLD: NORMAL
WBC NRBC COR # BLD AUTO: 5.19 10*3/MM3 (ref 3.4–10.8)

## 2024-12-21 PROCEDURE — 82948 REAGENT STRIP/BLOOD GLUCOSE: CPT | Performed by: HOSPITALIST

## 2024-12-21 PROCEDURE — 63710000001 INSULIN LISPRO (HUMAN) PER 5 UNITS: Performed by: HOSPITALIST

## 2024-12-21 PROCEDURE — 99233 SBSQ HOSP IP/OBS HIGH 50: CPT | Performed by: INTERNAL MEDICINE

## 2024-12-21 PROCEDURE — 85025 COMPLETE CBC W/AUTO DIFF WBC: CPT | Performed by: INTERNAL MEDICINE

## 2024-12-21 PROCEDURE — 63710000001 INSULIN GLARGINE PER 5 UNITS: Performed by: INTERNAL MEDICINE

## 2024-12-21 PROCEDURE — 82948 REAGENT STRIP/BLOOD GLUCOSE: CPT

## 2024-12-21 PROCEDURE — 25010000002 ONDANSETRON PER 1 MG: Performed by: HOSPITALIST

## 2024-12-21 PROCEDURE — 83735 ASSAY OF MAGNESIUM: CPT | Performed by: INTERNAL MEDICINE

## 2024-12-21 PROCEDURE — 85007 BL SMEAR W/DIFF WBC COUNT: CPT | Performed by: INTERNAL MEDICINE

## 2024-12-21 PROCEDURE — 25010000002 NA FERRIC GLUC CPLX PER 12.5 MG: Performed by: INTERNAL MEDICINE

## 2024-12-21 PROCEDURE — 84100 ASSAY OF PHOSPHORUS: CPT | Performed by: INTERNAL MEDICINE

## 2024-12-21 PROCEDURE — 25010000002 FUROSEMIDE PER 20 MG: Performed by: INTERNAL MEDICINE

## 2024-12-21 PROCEDURE — 80048 BASIC METABOLIC PNL TOTAL CA: CPT | Performed by: INTERNAL MEDICINE

## 2024-12-21 RX ORDER — MORPHINE SULFATE 2 MG/ML
2 INJECTION, SOLUTION INTRAMUSCULAR; INTRAVENOUS ONCE
Status: COMPLETED | OUTPATIENT
Start: 2024-12-22 | End: 2024-12-22

## 2024-12-21 RX ORDER — FUROSEMIDE 10 MG/ML
40 INJECTION INTRAMUSCULAR; INTRAVENOUS EVERY 8 HOURS
Status: DISCONTINUED | OUTPATIENT
Start: 2024-12-21 | End: 2024-12-22

## 2024-12-21 RX ORDER — POTASSIUM CHLORIDE 750 MG/1
40 CAPSULE, EXTENDED RELEASE ORAL
Status: COMPLETED | OUTPATIENT
Start: 2024-12-21 | End: 2024-12-21

## 2024-12-21 RX ADMIN — WHITE PETROLATUM 1 APPLICATION: 1.75 OINTMENT TOPICAL at 12:17

## 2024-12-21 RX ADMIN — METOPROLOL SUCCINATE 12.5 MG: 25 TABLET, EXTENDED RELEASE ORAL at 09:19

## 2024-12-21 RX ADMIN — POTASSIUM CHLORIDE 40 MEQ: 750 CAPSULE, EXTENDED RELEASE ORAL at 12:50

## 2024-12-21 RX ADMIN — Medication 10 ML: at 20:29

## 2024-12-21 RX ADMIN — POTASSIUM CHLORIDE 40 MEQ: 750 CAPSULE, EXTENDED RELEASE ORAL at 17:15

## 2024-12-21 RX ADMIN — BACLOFEN 20 MG: 10 TABLET ORAL at 17:15

## 2024-12-21 RX ADMIN — Medication 10 ML: at 09:20

## 2024-12-21 RX ADMIN — BACLOFEN 20 MG: 10 TABLET ORAL at 09:20

## 2024-12-21 RX ADMIN — INSULIN LISPRO 2 UNITS: 100 INJECTION, SOLUTION INTRAVENOUS; SUBCUTANEOUS at 12:50

## 2024-12-21 RX ADMIN — INSULIN LISPRO 2 UNITS: 100 INJECTION, SOLUTION INTRAVENOUS; SUBCUTANEOUS at 17:15

## 2024-12-21 RX ADMIN — DIGOXIN 125 MCG: 250 TABLET ORAL at 12:50

## 2024-12-21 RX ADMIN — TRAMADOL HYDROCHLORIDE 100 MG: 50 TABLET, COATED ORAL at 01:35

## 2024-12-21 RX ADMIN — INSULIN GLARGINE 10 UNITS: 100 INJECTION, SOLUTION SUBCUTANEOUS at 09:21

## 2024-12-21 RX ADMIN — METOLAZONE 10 MG: 5 TABLET ORAL at 09:19

## 2024-12-21 RX ADMIN — FUROSEMIDE 40 MG: 10 INJECTION, SOLUTION INTRAMUSCULAR; INTRAVENOUS at 23:10

## 2024-12-21 RX ADMIN — INSULIN LISPRO 6 UNITS: 100 INJECTION, SOLUTION INTRAVENOUS; SUBCUTANEOUS at 20:30

## 2024-12-21 RX ADMIN — FUROSEMIDE 40 MG: 10 INJECTION, SOLUTION INTRAMUSCULAR; INTRAVENOUS at 15:02

## 2024-12-21 RX ADMIN — APIXABAN 5 MG: 5 TABLET, FILM COATED ORAL at 09:19

## 2024-12-21 RX ADMIN — INSULIN LISPRO 2 UNITS: 100 INJECTION, SOLUTION INTRAVENOUS; SUBCUTANEOUS at 09:21

## 2024-12-21 RX ADMIN — TRAMADOL HYDROCHLORIDE 100 MG: 50 TABLET, COATED ORAL at 09:19

## 2024-12-21 RX ADMIN — BUPROPION HYDROCHLORIDE 150 MG: 150 TABLET, EXTENDED RELEASE ORAL at 09:19

## 2024-12-21 RX ADMIN — BACLOFEN 20 MG: 10 TABLET ORAL at 01:36

## 2024-12-21 RX ADMIN — WHITE PETROLATUM 1 APPLICATION: 1.75 OINTMENT TOPICAL at 20:31

## 2024-12-21 RX ADMIN — SODIUM CHLORIDE 125 MG: 9 INJECTION, SOLUTION INTRAVENOUS at 09:22

## 2024-12-21 RX ADMIN — APIXABAN 5 MG: 5 TABLET, FILM COATED ORAL at 20:29

## 2024-12-21 RX ADMIN — ONDANSETRON 4 MG: 2 INJECTION INTRAMUSCULAR; INTRAVENOUS at 21:15

## 2024-12-21 RX ADMIN — ATORVASTATIN CALCIUM 10 MG: 10 TABLET, FILM COATED ORAL at 20:30

## 2024-12-21 RX ADMIN — FUROSEMIDE 40 MG: 10 INJECTION, SOLUTION INTRAMUSCULAR; INTRAVENOUS at 04:44

## 2024-12-21 RX ADMIN — SERTRALINE 50 MG: 50 TABLET, FILM COATED ORAL at 20:30

## 2024-12-21 RX ADMIN — TRAMADOL HYDROCHLORIDE 100 MG: 50 TABLET, COATED ORAL at 17:15

## 2024-12-21 RX ADMIN — POTASSIUM CHLORIDE 40 MEQ: 750 CAPSULE, EXTENDED RELEASE ORAL at 09:19

## 2024-12-21 NOTE — PLAN OF CARE
Goal Outcome Evaluation:  Plan of Care Reviewed With: patient        Progress: no change  Outcome Evaluation: Pt a&ox4. Medicated prn for c/o pain in back, hips, and BLE. Skin and wound care provided per orders. Fluid restriction continued; peralta catheter remains present ad intact.

## 2024-12-21 NOTE — PROGRESS NOTES
[facility]   Hospitalist Progress Note  Date: 2024  Patient Name: Jose Shaikh  : 1960  MRN: [id number]  Date of admission: 2024      Subjective   Subjective     Chief Complaint: Lower extremity swelling    Summary: 64-year-old man who presents to the emergency room for worsening lower extremity swelling, shortness of breath, and suicidal ideation.  Patient also states that he does not have anybody at home to take care of him.  He has chronic leg wounds.  He has a history of diabetes atrial fibrillation ulcers.  The patient states that he is going to take all his muscle relaxants to kill himself because he cannot go on living like this.     Interval Followup: No acute events overnight.  Urinating frequently with diuretics.  Muscle spasms are a little better today.    Objective   Objective     Vitals:   Temp:  [97.2 °F (36.2 °C)-98.1 °F (36.7 °C)] 97.9 °F (36.6 °C)  Heart Rate:  [57-79] 68  Resp:  [18] 18  BP: (105-121)/(53-69) 121/63  Physical Exam    Constitutional: Chronically ill-appearing male, obese, awake and alert   Respiratory: Essentially clear to auscultation bilaterally, nonlabored respirations, significant lower extremity swelling, scrotal swelling present   Cardiovascular: RRR, no MRG   Gastrointestinal: Positive bowel sounds, soft, NTND   Neurologic: Oriented x 3, strength symmetric in all extremities, Cranial Nerves grossly intact to confrontation, speech clear    Result Review    I have personally reviewed the results below:  [x]  Laboratory personally reviewed BMP, CBC, magnesium, phosphorus  []  Microbiology  []  Radiology  []  EKG/Telemetry   []  Cardiology/Vascular   []  Pathology  []  Old records  []  Other:  CBC          2024    04:23 2024    05:19 2024    05:34   CBC   WBC 7.41  5.44  5.19    RBC 4.46  4.20  4.09    Hemoglobin 8.7  8.2  8.0    Hematocrit 30.6  28.5  27.9    MCV 68.6  67.9  68.2    MCH 19.5  19.5  19.6    MCHC 28.4  28.8  28.7    RDW  19.1  19.1  19.2    Platelets 177  161  151      CMP          12/19/2024    04:23 12/20/2024    05:19 12/21/2024    05:34   CMP   Glucose 175  204  204    BUN 6  6  7    Creatinine 0.43  0.36  0.46    EGFR 117.7  124.2  115.4    Sodium 134  131  132    Potassium 3.5  3.0  3.1    Chloride 100  97  95    Calcium 7.8  7.5  7.7    BUN/Creatinine Ratio 14.0  16.7  15.2    Anion Gap 7.9  5.3  9.3        Assessment & Plan   Assessment / Plan   Acute on chronic diastolic congestive heart failure with acute exacerbation  Suicidal ideation  Chronic bilateral foot wounds  Paroxysmal atrial fibrillation  Type 2 diabetes mellitus  Morbid obesity with BMI of 52  Debility with wheelchair dependence  Osteoarthritis of the hip  Chronic venous stasis  Deconditioning  Hypomagnesemia  Buried penis  Hypophosphatemia  Hemorrhoids  Anasarca    Continue to monitor in the hospital for workup and management of the above  Continue Wellbutrin per psychiatry recommendations  Crease Lasix to 40 mg IV every 8 hours, continue metolazone 10 mg daily  Strict I's and O's, trend renal function electrolytes closely on IV diuretics  Continue King catheter for now  Continue Tylenol, baclofen, or Ultram as needed for pain  Wound care following, appreciate assistance  Continue with lower extremity compression  Continue with IV iron transfusion daily x 3 days, will transition to oral iron thereafter  Replace potassium orally  Lantus 10 units daily, continue with sliding scale insulin  Continue appropriate home medications  Wound care following  PT/OT/social work consulted  Trend renal function and electrolytes with a.m. BMP, magnesium   Trend Hgb and WBC with a.m. CBC     Discussed plan with RN    VTE Prophylaxis:  Pharmacologic & mechanical VTE prophylaxis orders are present.        CODE STATUS:   Level Of Support Discussed With: Patient  Code Status (Patient has no pulse and is not breathing): CPR (Attempt to Resuscitate)  Medical Interventions (Patient  has pulse or is breathing): Full Support

## 2024-12-21 NOTE — PLAN OF CARE
Goal Outcome Evaluation:  Plan of Care Reviewed With: patient        Progress: no change  Outcome Evaluation: Patient is alert and oriented x4. Medicated with PRN pain medication per MAR. Skin and wound care performed as ordered. Patient refuses to have stat lock on peralta catheter stating it pulls more with the stat lock in place. Patient has open area on bottom that has worsened and started bleeding. Wound care consult re-entered and dressed per protocol. Patient educated on the importance of turning to prevent worsening of wound. After one hour patient refused to stay turned, stating it was hurting his hips and he could not tolerate. Patient again, educated on the importance of turning. Will continue to remind and encourage. Blood glucose monitored. No new issues at this time.

## 2024-12-22 LAB
ANION GAP SERPL CALCULATED.3IONS-SCNC: 3.9 MMOL/L (ref 5–15)
BASOPHILS # BLD AUTO: 0.04 10*3/MM3 (ref 0–0.2)
BASOPHILS NFR BLD AUTO: 0.7 % (ref 0–1.5)
BUN SERPL-MCNC: 8 MG/DL (ref 8–23)
BUN/CREAT SERPL: 16 (ref 7–25)
CALCIUM SPEC-SCNC: 7.8 MG/DL (ref 8.6–10.5)
CHLORIDE SERPL-SCNC: 95 MMOL/L (ref 98–107)
CO2 SERPL-SCNC: 36.1 MMOL/L (ref 22–29)
CREAT SERPL-MCNC: 0.5 MG/DL (ref 0.76–1.27)
DEPRECATED RDW RBC AUTO: 46.1 FL (ref 37–54)
EGFRCR SERPLBLD CKD-EPI 2021: 112.5 ML/MIN/1.73
EOSINOPHIL # BLD AUTO: 0.18 10*3/MM3 (ref 0–0.4)
EOSINOPHIL NFR BLD AUTO: 3.4 % (ref 0.3–6.2)
ERYTHROCYTE [DISTWIDTH] IN BLOOD BY AUTOMATED COUNT: 20.1 % (ref 12.3–15.4)
GLUCOSE BLDC GLUCOMTR-MCNC: 129 MG/DL (ref 70–99)
GLUCOSE BLDC GLUCOMTR-MCNC: 149 MG/DL (ref 70–99)
GLUCOSE BLDC GLUCOMTR-MCNC: 163 MG/DL (ref 70–99)
GLUCOSE BLDC GLUCOMTR-MCNC: 172 MG/DL (ref 70–99)
GLUCOSE SERPL-MCNC: 146 MG/DL (ref 65–99)
HCT VFR BLD AUTO: 28.4 % (ref 37.5–51)
HGB BLD-MCNC: 8.1 G/DL (ref 13–17.7)
IMM GRANULOCYTES # BLD AUTO: 0.01 10*3/MM3 (ref 0–0.05)
IMM GRANULOCYTES NFR BLD AUTO: 0.2 % (ref 0–0.5)
LYMPHOCYTES # BLD AUTO: 1.01 10*3/MM3 (ref 0.7–3.1)
LYMPHOCYTES NFR BLD AUTO: 18.9 % (ref 19.6–45.3)
MAGNESIUM SERPL-MCNC: 1.4 MG/DL (ref 1.6–2.4)
MCH RBC QN AUTO: 19.6 PG (ref 26.6–33)
MCHC RBC AUTO-ENTMCNC: 28.5 G/DL (ref 31.5–35.7)
MCV RBC AUTO: 68.6 FL (ref 79–97)
MONOCYTES # BLD AUTO: 0.67 10*3/MM3 (ref 0.1–0.9)
MONOCYTES NFR BLD AUTO: 12.5 % (ref 5–12)
NEUTROPHILS NFR BLD AUTO: 3.44 10*3/MM3 (ref 1.7–7)
NEUTROPHILS NFR BLD AUTO: 64.3 % (ref 42.7–76)
NRBC BLD AUTO-RTO: 0 /100 WBC (ref 0–0.2)
PHOSPHATE SERPL-MCNC: 3.2 MG/DL (ref 2.5–4.5)
PLATELET # BLD AUTO: 147 10*3/MM3 (ref 140–450)
PMV BLD AUTO: ABNORMAL FL
POTASSIUM SERPL-SCNC: 3.2 MMOL/L (ref 3.5–5.2)
RBC # BLD AUTO: 4.14 10*6/MM3 (ref 4.14–5.8)
SODIUM SERPL-SCNC: 135 MMOL/L (ref 136–145)
WBC NRBC COR # BLD AUTO: 5.35 10*3/MM3 (ref 3.4–10.8)

## 2024-12-22 PROCEDURE — 82948 REAGENT STRIP/BLOOD GLUCOSE: CPT | Performed by: HOSPITALIST

## 2024-12-22 PROCEDURE — 63710000001 INSULIN GLARGINE PER 5 UNITS: Performed by: INTERNAL MEDICINE

## 2024-12-22 PROCEDURE — 25010000002 FUROSEMIDE PER 20 MG: Performed by: INTERNAL MEDICINE

## 2024-12-22 PROCEDURE — 25010000002 MAGNESIUM SULFATE 4 GM/100ML SOLUTION: Performed by: INTERNAL MEDICINE

## 2024-12-22 PROCEDURE — 83735 ASSAY OF MAGNESIUM: CPT | Performed by: INTERNAL MEDICINE

## 2024-12-22 PROCEDURE — 63710000001 INSULIN LISPRO (HUMAN) PER 5 UNITS: Performed by: HOSPITALIST

## 2024-12-22 PROCEDURE — 85025 COMPLETE CBC W/AUTO DIFF WBC: CPT | Performed by: INTERNAL MEDICINE

## 2024-12-22 PROCEDURE — 82948 REAGENT STRIP/BLOOD GLUCOSE: CPT

## 2024-12-22 PROCEDURE — 25010000002 ACETAZOLAMIDE PER 500 MG: Performed by: INTERNAL MEDICINE

## 2024-12-22 PROCEDURE — 84100 ASSAY OF PHOSPHORUS: CPT | Performed by: INTERNAL MEDICINE

## 2024-12-22 PROCEDURE — 80048 BASIC METABOLIC PNL TOTAL CA: CPT | Performed by: INTERNAL MEDICINE

## 2024-12-22 PROCEDURE — 99233 SBSQ HOSP IP/OBS HIGH 50: CPT | Performed by: INTERNAL MEDICINE

## 2024-12-22 PROCEDURE — 25010000002 MORPHINE PER 10 MG: Performed by: FAMILY MEDICINE

## 2024-12-22 RX ORDER — MORPHINE SULFATE 2 MG/ML
2 INJECTION, SOLUTION INTRAMUSCULAR; INTRAVENOUS ONCE
Status: COMPLETED | OUTPATIENT
Start: 2024-12-22 | End: 2024-12-22

## 2024-12-22 RX ORDER — FUROSEMIDE 10 MG/ML
40 INJECTION INTRAMUSCULAR; INTRAVENOUS EVERY 12 HOURS
Status: DISCONTINUED | OUTPATIENT
Start: 2024-12-22 | End: 2024-12-25

## 2024-12-22 RX ORDER — POTASSIUM CHLORIDE 750 MG/1
40 CAPSULE, EXTENDED RELEASE ORAL
Status: COMPLETED | OUTPATIENT
Start: 2024-12-22 | End: 2024-12-22

## 2024-12-22 RX ORDER — MAGNESIUM SULFATE HEPTAHYDRATE 40 MG/ML
4 INJECTION, SOLUTION INTRAVENOUS ONCE
Status: COMPLETED | OUTPATIENT
Start: 2024-12-22 | End: 2024-12-22

## 2024-12-22 RX ORDER — TRAMADOL HYDROCHLORIDE 50 MG/1
100 TABLET ORAL EVERY 6 HOURS PRN
Status: DISCONTINUED | OUTPATIENT
Start: 2024-12-22 | End: 2025-01-03

## 2024-12-22 RX ADMIN — POTASSIUM CHLORIDE 40 MEQ: 750 CAPSULE, EXTENDED RELEASE ORAL at 09:34

## 2024-12-22 RX ADMIN — MAGNESIUM SULFATE HEPTAHYDRATE 4 G: 4 INJECTION, SOLUTION INTRAVENOUS at 09:35

## 2024-12-22 RX ADMIN — MORPHINE SULFATE 2 MG: 2 INJECTION, SOLUTION INTRAMUSCULAR; INTRAVENOUS at 00:00

## 2024-12-22 RX ADMIN — ACETAZOLAMIDE 500 MG: 500 INJECTION, POWDER, LYOPHILIZED, FOR SOLUTION INTRAVENOUS at 09:33

## 2024-12-22 RX ADMIN — TRAMADOL HYDROCHLORIDE 100 MG: 50 TABLET, COATED ORAL at 23:48

## 2024-12-22 RX ADMIN — FUROSEMIDE 40 MG: 10 INJECTION, SOLUTION INTRAMUSCULAR; INTRAVENOUS at 07:47

## 2024-12-22 RX ADMIN — DIGOXIN 125 MCG: 250 TABLET ORAL at 11:40

## 2024-12-22 RX ADMIN — POTASSIUM CHLORIDE 40 MEQ: 750 CAPSULE, EXTENDED RELEASE ORAL at 17:30

## 2024-12-22 RX ADMIN — MORPHINE SULFATE 2 MG: 2 INJECTION, SOLUTION INTRAMUSCULAR; INTRAVENOUS at 07:47

## 2024-12-22 RX ADMIN — SERTRALINE 50 MG: 50 TABLET, FILM COATED ORAL at 20:26

## 2024-12-22 RX ADMIN — INSULIN LISPRO 2 UNITS: 100 INJECTION, SOLUTION INTRAVENOUS; SUBCUTANEOUS at 20:26

## 2024-12-22 RX ADMIN — WHITE PETROLATUM 1 APPLICATION: 1.75 OINTMENT TOPICAL at 20:28

## 2024-12-22 RX ADMIN — INSULIN GLARGINE 10 UNITS: 100 INJECTION, SOLUTION SUBCUTANEOUS at 09:33

## 2024-12-22 RX ADMIN — ATORVASTATIN CALCIUM 10 MG: 10 TABLET, FILM COATED ORAL at 20:26

## 2024-12-22 RX ADMIN — APIXABAN 5 MG: 5 TABLET, FILM COATED ORAL at 09:34

## 2024-12-22 RX ADMIN — BACLOFEN 20 MG: 10 TABLET ORAL at 09:35

## 2024-12-22 RX ADMIN — APIXABAN 5 MG: 5 TABLET, FILM COATED ORAL at 20:26

## 2024-12-22 RX ADMIN — SENNOSIDES AND DOCUSATE SODIUM 2 TABLET: 50; 8.6 TABLET ORAL at 09:34

## 2024-12-22 RX ADMIN — INSULIN LISPRO 2 UNITS: 100 INJECTION, SOLUTION INTRAVENOUS; SUBCUTANEOUS at 17:30

## 2024-12-22 RX ADMIN — SENNOSIDES AND DOCUSATE SODIUM 2 TABLET: 50; 8.6 TABLET ORAL at 20:26

## 2024-12-22 RX ADMIN — TRAMADOL HYDROCHLORIDE 100 MG: 50 TABLET, COATED ORAL at 11:12

## 2024-12-22 RX ADMIN — BUPROPION HYDROCHLORIDE 150 MG: 150 TABLET, EXTENDED RELEASE ORAL at 09:34

## 2024-12-22 RX ADMIN — TRAMADOL HYDROCHLORIDE 100 MG: 50 TABLET, COATED ORAL at 00:43

## 2024-12-22 RX ADMIN — BACLOFEN 20 MG: 10 TABLET ORAL at 00:43

## 2024-12-22 RX ADMIN — METOPROLOL SUCCINATE 12.5 MG: 25 TABLET, EXTENDED RELEASE ORAL at 09:34

## 2024-12-22 RX ADMIN — BACLOFEN 20 MG: 10 TABLET ORAL at 19:42

## 2024-12-22 RX ADMIN — FUROSEMIDE 40 MG: 10 INJECTION, SOLUTION INTRAMUSCULAR; INTRAVENOUS at 19:42

## 2024-12-22 RX ADMIN — POTASSIUM CHLORIDE 40 MEQ: 750 CAPSULE, EXTENDED RELEASE ORAL at 11:40

## 2024-12-22 RX ADMIN — Medication 10 ML: at 09:36

## 2024-12-22 RX ADMIN — ACETAMINOPHEN 650 MG: 325 TABLET ORAL at 04:38

## 2024-12-22 RX ADMIN — TRAMADOL HYDROCHLORIDE 100 MG: 50 TABLET, COATED ORAL at 17:30

## 2024-12-22 RX ADMIN — Medication 10 ML: at 20:27

## 2024-12-22 NOTE — PLAN OF CARE
Goal Outcome Evaluation:  Plan of Care Reviewed With: patient        Progress: no change  Outcome Evaluation: Alert and oriented x4 throughout shift. Patient refused most turns this shift. Frequently educated on importance of turning. Skin and wound care completed per orders. Patient refusing to let staff place statlock for peralta, peralta care completed per orders. Medicated for pain per MAR. No new issues at this time

## 2024-12-22 NOTE — PROGRESS NOTES
[facility]   Hospitalist Progress Note  Date: 2024  Patient Name: Jose Shaikh  : 1960  MRN: [id number]  Date of admission: 2024      Subjective   Subjective     Chief Complaint: Lower extremity swelling    Summary: 64-year-old man who presents to the emergency room for worsening lower extremity swelling, shortness of breath, and suicidal ideation.  Patient also states that he does not have anybody at home to take care of him.  He has chronic leg wounds.  He has a history of diabetes atrial fibrillation ulcers.  The patient states that he is going to take all his muscle relaxants to kill himself because he cannot go on living like this.  He was admitted for further care, psychiatry was consulted.  Patient was no longer suicidal after he was admitted to the hospital and bedside sitter was discontinued.  He was started on Wellbutrin per psychiatry recommendations.  He was diuresed aggressively, King catheter was placed by urology.  Wound care consulted.     Interval Followup: No acute events overnight.  Urinating frequently with diuretics.  Muscle spasms are a little better today.    Objective   Objective     Vitals:   Temp:  [97.9 °F (36.6 °C)-98.1 °F (36.7 °C)] 98.1 °F (36.7 °C)  Heart Rate:  [63-76] 68  Resp:  [18] 18  BP: ()/(47-73) 104/70  Physical Exam    Constitutional: Chronically ill-appearing male, awake and alert   Respiratory: Essentially clear to auscultation bilaterally, nonlabored respirations, improving lower extremity edema, persistent but improving scrotal edema   cardiovascular: RRR, no MRG   Gastrointestinal: Positive bowel sounds, soft, NTND   Neurologic: Oriented x 3, strength symmetric in all extremities, Cranial Nerves grossly intact to confrontation, speech clear    Result Review    I have personally reviewed the results below:  [x]  Laboratory personally reviewed BMP, CBC, magnesium, phosphorus  []  Microbiology  []  Radiology  []  EKG/Telemetry   []   Cardiology/Vascular   []  Pathology  []  Old records  []  Other:  CBC          12/20/2024    05:19 12/21/2024    05:34 12/22/2024    05:58   CBC   WBC 5.44  5.19  5.35    RBC 4.20  4.09  4.14    Hemoglobin 8.2  8.0  8.1    Hematocrit 28.5  27.9  28.4    MCV 67.9  68.2  68.6    MCH 19.5  19.6  19.6    MCHC 28.8  28.7  28.5    RDW 19.1  19.2  20.1    Platelets 161  151  147      CMP          12/20/2024    05:19 12/21/2024    05:34 12/22/2024    05:58   CMP   Glucose 204  204  146    BUN 6  7  8    Creatinine 0.36  0.46  0.50    EGFR 124.2  115.4  112.5    Sodium 131  132  135    Potassium 3.0  3.1  3.2    Chloride 97  95  95    Calcium 7.5  7.7  7.8    BUN/Creatinine Ratio 16.7  15.2  16.0    Anion Gap 5.3  9.3  3.9        Assessment & Plan   Assessment / Plan   Acute on chronic diastolic congestive heart failure with acute exacerbation  Suicidal ideation  Chronic bilateral foot wounds  Paroxysmal atrial fibrillation  Type 2 diabetes mellitus  Morbid obesity with BMI of 52  Debility with wheelchair dependence  Osteoarthritis of the hip  Chronic venous stasis  Deconditioning  Hypomagnesemia  Buried penis  Hypophosphatemia  Hemorrhoids  Anasarca    Continue to monitor in the hospital for workup and management of the above  Continue Wellbutrin per psychiatry recommendations  Notable metabolic alkalosis from diuretics, will decrease Lasix to 40 mg IV twice daily, dose Diamox IV x 1  Strict I's and O's, trend renal function electrolytes closely on IV diuretics  Continue King catheter for now  Continue Tylenol, baclofen, or Ultram as needed for pain  Wound care following, appreciate assistance  Continue with lower extremity compression  Transition oral iron supplement  Replace potassium orally  Lantus 10 units daily, continue with sliding scale insulin  Continue appropriate home medications  Wound care following  PT/OT/social work consulted  Trend renal function and electrolytes with a.m. BMP, magnesium   Trend Hgb and WBC  with kahlil BRANNON     Discussed plan with RN    VTE Prophylaxis:  Pharmacologic & mechanical VTE prophylaxis orders are present.        CODE STATUS:   Level Of Support Discussed With: Patient  Code Status (Patient has no pulse and is not breathing): CPR (Attempt to Resuscitate)  Medical Interventions (Patient has pulse or is breathing): Full Support

## 2024-12-22 NOTE — PLAN OF CARE
Goal Outcome Evaluation:  Plan of Care Reviewed With: patient        Progress: no change  Outcome Evaluation: A & O x 4, C/O pain to right hip, medication given per orders. Skin and wound care provided, tolerated fairly well with some discomfort noted. Provided education on importance of turning to relieve pressure to the painful areas, appears to understand. Catheter care provided after educating patient on importance of keeping the area clean. Appears to be resting in bed watching television.

## 2024-12-23 LAB
ANION GAP SERPL CALCULATED.3IONS-SCNC: 6.8 MMOL/L (ref 5–15)
BASOPHILS # BLD AUTO: 0.06 10*3/MM3 (ref 0–0.2)
BASOPHILS NFR BLD AUTO: 1 % (ref 0–1.5)
BUN SERPL-MCNC: 9 MG/DL (ref 8–23)
BUN/CREAT SERPL: 18.4 (ref 7–25)
CALCIUM SPEC-SCNC: 8 MG/DL (ref 8.6–10.5)
CHLORIDE SERPL-SCNC: 95 MMOL/L (ref 98–107)
CO2 SERPL-SCNC: 30.2 MMOL/L (ref 22–29)
CREAT SERPL-MCNC: 0.49 MG/DL (ref 0.76–1.27)
DEPRECATED RDW RBC AUTO: 48.1 FL (ref 37–54)
EGFRCR SERPLBLD CKD-EPI 2021: 113.2 ML/MIN/1.73
EOSINOPHIL # BLD AUTO: 0.23 10*3/MM3 (ref 0–0.4)
EOSINOPHIL NFR BLD AUTO: 4 % (ref 0.3–6.2)
ERYTHROCYTE [DISTWIDTH] IN BLOOD BY AUTOMATED COUNT: 21.1 % (ref 12.3–15.4)
GLUCOSE BLDC GLUCOMTR-MCNC: 132 MG/DL (ref 70–99)
GLUCOSE BLDC GLUCOMTR-MCNC: 159 MG/DL (ref 70–99)
GLUCOSE BLDC GLUCOMTR-MCNC: 173 MG/DL (ref 70–99)
GLUCOSE BLDC GLUCOMTR-MCNC: 198 MG/DL (ref 70–99)
GLUCOSE SERPL-MCNC: 129 MG/DL (ref 65–99)
HCT VFR BLD AUTO: 29.6 % (ref 37.5–51)
HGB BLD-MCNC: 8.6 G/DL (ref 13–17.7)
IMM GRANULOCYTES # BLD AUTO: 0.01 10*3/MM3 (ref 0–0.05)
IMM GRANULOCYTES NFR BLD AUTO: 0.2 % (ref 0–0.5)
LYMPHOCYTES # BLD AUTO: 0.97 10*3/MM3 (ref 0.7–3.1)
LYMPHOCYTES NFR BLD AUTO: 16.9 % (ref 19.6–45.3)
MAGNESIUM SERPL-MCNC: 1.8 MG/DL (ref 1.6–2.4)
MCH RBC QN AUTO: 20.2 PG (ref 26.6–33)
MCHC RBC AUTO-ENTMCNC: 29.1 G/DL (ref 31.5–35.7)
MCV RBC AUTO: 69.5 FL (ref 79–97)
MONOCYTES # BLD AUTO: 0.68 10*3/MM3 (ref 0.1–0.9)
MONOCYTES NFR BLD AUTO: 11.9 % (ref 5–12)
NEUTROPHILS NFR BLD AUTO: 3.78 10*3/MM3 (ref 1.7–7)
NEUTROPHILS NFR BLD AUTO: 66 % (ref 42.7–76)
NRBC BLD AUTO-RTO: 0 /100 WBC (ref 0–0.2)
PHOSPHATE SERPL-MCNC: 3.5 MG/DL (ref 2.5–4.5)
PLATELET # BLD AUTO: 159 10*3/MM3 (ref 140–450)
PMV BLD AUTO: ABNORMAL FL
POTASSIUM SERPL-SCNC: 3.2 MMOL/L (ref 3.5–5.2)
QT INTERVAL: 373 MS
QTC INTERVAL: 451 MS
RBC # BLD AUTO: 4.26 10*6/MM3 (ref 4.14–5.8)
SODIUM SERPL-SCNC: 132 MMOL/L (ref 136–145)
WBC NRBC COR # BLD AUTO: 5.73 10*3/MM3 (ref 3.4–10.8)

## 2024-12-23 PROCEDURE — 25010000002 ACETAZOLAMIDE PER 500 MG: Performed by: INTERNAL MEDICINE

## 2024-12-23 PROCEDURE — 82948 REAGENT STRIP/BLOOD GLUCOSE: CPT

## 2024-12-23 PROCEDURE — 82948 REAGENT STRIP/BLOOD GLUCOSE: CPT | Performed by: HOSPITALIST

## 2024-12-23 PROCEDURE — 63710000001 INSULIN LISPRO (HUMAN) PER 5 UNITS: Performed by: HOSPITALIST

## 2024-12-23 PROCEDURE — 80048 BASIC METABOLIC PNL TOTAL CA: CPT | Performed by: INTERNAL MEDICINE

## 2024-12-23 PROCEDURE — 83735 ASSAY OF MAGNESIUM: CPT | Performed by: INTERNAL MEDICINE

## 2024-12-23 PROCEDURE — 84100 ASSAY OF PHOSPHORUS: CPT | Performed by: INTERNAL MEDICINE

## 2024-12-23 PROCEDURE — 25010000002 FUROSEMIDE PER 20 MG: Performed by: INTERNAL MEDICINE

## 2024-12-23 PROCEDURE — 25010000002 MORPHINE PER 10 MG: Performed by: FAMILY MEDICINE

## 2024-12-23 PROCEDURE — 85025 COMPLETE CBC W/AUTO DIFF WBC: CPT | Performed by: INTERNAL MEDICINE

## 2024-12-23 PROCEDURE — 99233 SBSQ HOSP IP/OBS HIGH 50: CPT | Performed by: INTERNAL MEDICINE

## 2024-12-23 RX ORDER — POTASSIUM CHLORIDE 750 MG/1
40 CAPSULE, EXTENDED RELEASE ORAL
Status: COMPLETED | OUTPATIENT
Start: 2024-12-23 | End: 2024-12-23

## 2024-12-23 RX ORDER — MORPHINE SULFATE 2 MG/ML
2 INJECTION, SOLUTION INTRAMUSCULAR; INTRAVENOUS ONCE
Status: COMPLETED | OUTPATIENT
Start: 2024-12-23 | End: 2024-12-23

## 2024-12-23 RX ORDER — CALCIUM CARBONATE 500 MG/1
2 TABLET, CHEWABLE ORAL 3 TIMES DAILY PRN
Status: DISCONTINUED | OUTPATIENT
Start: 2024-12-23 | End: 2025-01-16 | Stop reason: HOSPADM

## 2024-12-23 RX ADMIN — SERTRALINE 50 MG: 50 TABLET, FILM COATED ORAL at 21:13

## 2024-12-23 RX ADMIN — Medication 10 ML: at 21:14

## 2024-12-23 RX ADMIN — BUPROPION HYDROCHLORIDE 150 MG: 150 TABLET, EXTENDED RELEASE ORAL at 09:00

## 2024-12-23 RX ADMIN — TRAMADOL HYDROCHLORIDE 100 MG: 50 TABLET, COATED ORAL at 12:37

## 2024-12-23 RX ADMIN — FUROSEMIDE 40 MG: 10 INJECTION, SOLUTION INTRAMUSCULAR; INTRAVENOUS at 09:00

## 2024-12-23 RX ADMIN — Medication 10 ML: at 09:02

## 2024-12-23 RX ADMIN — POTASSIUM CHLORIDE 40 MEQ: 750 CAPSULE, EXTENDED RELEASE ORAL at 12:37

## 2024-12-23 RX ADMIN — TRAMADOL HYDROCHLORIDE 100 MG: 50 TABLET, COATED ORAL at 06:06

## 2024-12-23 RX ADMIN — SENNOSIDES AND DOCUSATE SODIUM 2 TABLET: 50; 8.6 TABLET ORAL at 09:00

## 2024-12-23 RX ADMIN — APIXABAN 5 MG: 5 TABLET, FILM COATED ORAL at 21:13

## 2024-12-23 RX ADMIN — POTASSIUM CHLORIDE 40 MEQ: 750 CAPSULE, EXTENDED RELEASE ORAL at 17:11

## 2024-12-23 RX ADMIN — INSULIN LISPRO 2 UNITS: 100 INJECTION, SOLUTION INTRAVENOUS; SUBCUTANEOUS at 12:37

## 2024-12-23 RX ADMIN — APIXABAN 5 MG: 5 TABLET, FILM COATED ORAL at 09:00

## 2024-12-23 RX ADMIN — ATORVASTATIN CALCIUM 10 MG: 10 TABLET, FILM COATED ORAL at 21:13

## 2024-12-23 RX ADMIN — POTASSIUM CHLORIDE 40 MEQ: 750 CAPSULE, EXTENDED RELEASE ORAL at 09:07

## 2024-12-23 RX ADMIN — INSULIN LISPRO 2 UNITS: 100 INJECTION, SOLUTION INTRAVENOUS; SUBCUTANEOUS at 17:11

## 2024-12-23 RX ADMIN — CALCIUM CARBONATE 2 TABLET: 500 TABLET, CHEWABLE ORAL at 11:00

## 2024-12-23 RX ADMIN — METOPROLOL SUCCINATE 12.5 MG: 25 TABLET, EXTENDED RELEASE ORAL at 09:00

## 2024-12-23 RX ADMIN — FUROSEMIDE 40 MG: 10 INJECTION, SOLUTION INTRAMUSCULAR; INTRAVENOUS at 18:45

## 2024-12-23 RX ADMIN — MORPHINE SULFATE 2 MG: 2 INJECTION, SOLUTION INTRAMUSCULAR; INTRAVENOUS at 21:13

## 2024-12-23 RX ADMIN — ACETAZOLAMIDE 500 MG: 500 INJECTION, POWDER, LYOPHILIZED, FOR SOLUTION INTRAVENOUS at 09:07

## 2024-12-23 RX ADMIN — DIGOXIN 125 MCG: 250 TABLET ORAL at 12:36

## 2024-12-23 RX ADMIN — INSULIN LISPRO 2 UNITS: 100 INJECTION, SOLUTION INTRAVENOUS; SUBCUTANEOUS at 21:13

## 2024-12-23 RX ADMIN — BACLOFEN 20 MG: 10 TABLET ORAL at 15:24

## 2024-12-23 RX ADMIN — TRAMADOL HYDROCHLORIDE 100 MG: 50 TABLET, COATED ORAL at 18:39

## 2024-12-23 NOTE — PLAN OF CARE
Goal Outcome Evaluation:  Plan of Care Reviewed With: patient        Progress: no change  Outcome Evaluation: Assumed care of patient @ 1315. Patient with frequent complaints of pain close to when PRNs have been administered. Patient educated on frequency of PRNs. Verbalized understanding. Patient continues to refuse turns despite education from nursing staff and wound care nurse.

## 2024-12-23 NOTE — PLAN OF CARE
Goal Outcome Evaluation:  Plan of Care Reviewed With: patient        Progress: no change  Outcome Evaluation: pt is alert and oriented x4. On room air. Pt frequently complaints of muscle spasm. Medicated per MAR. Pt refused to do turns and his skin care. King cath care performed. Pt still need a stool sample but did not have any bowel movement today. Wound care performed this shift. Blood glucose checked and given supplemental insulin. No new issues at this time. Continue plan of care.

## 2024-12-23 NOTE — SIGNIFICANT NOTE
Wound Eval / Progress Noted    KEO Dominguez     Patient Name: Jose Shaikh  : 1960  MRN: [id number]  Today's Date: 2024                 Admit Date: 2024    Visit Dx:    ICD-10-CM ICD-9-CM   1. Hypervolemia, unspecified hypervolemia type  E87.70 276.69   2. Congestive heart failure, unspecified HF chronicity, unspecified heart failure type  I50.9 428.0   3. Suicidal ideation  R45.851 V62.84         CHF exacerbation        Past Medical History:   Diagnosis Date    Absence of toe of right foot     Acute osteomyelitis of left calcaneus  2021    Anxiety and depression     Arthritis     Cancer     Chronic pain     STATES HIS PAIN IS 10/10 AAT    Claustrophobia     Corns and callus     Diabetic ulcer of left heel associated with type 2 DM 2021    Diabetic ulcer of left heel associated with type 2 DM 2021    Diabetic ulcer of right midfoot associated with type 2 DM 2021    Difficulty walking     Essential hypertension 2021    Hammertoe     Hyperlipidemia LDL goal <100 2021    Ingrown toenail     Obesity     Paroxysmal atrial fibrillation 2021    Polyneuropathy     Pressure ulcer, stage 1     Tinea unguium     Type 2 diabetes mellitus with polyneuropathy         Past Surgical History:   Procedure Laterality Date    CYST REMOVAL      center of back; benign    EYE SURGERY      INCISION AND DRAINAGE ABSCESS      back    INCISION AND DRAINAGE LEG Right 12/10/2021    Procedure: INCISION AND DRAINAGE LOWER EXTREMITY;  Surgeon: Ash Leyva DPM;  Location: [facility] MAIN OR;  Service: Podiatry;  Laterality: Right;    OTHER SURGICAL HISTORY      Surgical clips left foot    TOE SURGERY Right     Removal of 5th toe    TRANS METATARSAL AMPUTATION Right 2021    Procedure: AMPUTATION TRANS METATARSAL;  Surgeon: Ash Leyva DPM;  Location: [facility] MAIN OR;  Service: Podiatry;  Laterality: Right;    VASCULAR SURGERY      WRIST SURGERY Left     repair of  injury     Wound Check / Follow-up:  wound nurse reconsulted for bleeding areas to left buttocks and BLE wounds.     Overall skin is improved, on air mattress.     Did allow for wound nurse to assess today, still primarily is laying toward right side, rolled for assessment to right side. Serous fluid filled area to right abdomen lateral aspect noted, continues with redness to abdominal fold but has improved with hygiene and dry pads/pillowcases.   Back of legs redness is much improved.     Left buttocks area has some mild bleeding with wound care noted, removed dressing and suggesting orange top barrier cream to area to see if trauma from dressings can be minimized.     Left dorsal foot with dry crusting previously has loosened and oozing sanguineous drainage at this time. Cleansed with NS moist gauze and applied pink foam to area to protect area.   Rest of LLE is nearly resolved with pink tissue present. Continuing to apply lotions, foams to draining areas. Kerlix, and ace for edema.     Right lower lateral leg with oozing sanguineous drainage to 2 areas, cleansed with NS moist gauze and pink foam applied. Continuing to apply lotions, foams to draining areas. Kerlix, and ace for edema.       Impression: overall skin improved but continues to have red open oozing areas to left buttocks and left dorsal foot and RLE lateral aspect.   Decreasing dressings to daily.   Continue hygiene and repositioning.     Short term goals:  regain skin integrity, wound healing. Moisture management. Repositioning.     Ange Jimenes RN    12/23/2024    13:48 EST

## 2024-12-23 NOTE — PROGRESS NOTES
[facility]   Hospitalist Progress Note  Date: 2024  Patient Name: Jose Shaikh  : 1960  MRN: [id number]  Date of admission: 2024      Subjective   Subjective     Chief Complaint: Lower extremity swelling    Summary: 64-year-old man who presents to the emergency room for worsening lower extremity swelling, shortness of breath, and suicidal ideation.  Patient also states that he does not have anybody at home to take care of him.  He has chronic leg wounds.  He has a history of diabetes atrial fibrillation ulcers.  The patient states that he is going to take all his muscle relaxants to kill himself because he cannot go on living like this.  He was admitted for further care, psychiatry was consulted.  Patient was no longer suicidal after he was admitted to the hospital and bedside sitter was discontinued.  He was started on Wellbutrin per psychiatry recommendations.  He was diuresed aggressively, King catheter was placed by urology.  Wound care following.  Can have a voiding trial prior to discharge.  Having good urine output with over 25 L of fluid removed since admission.  Plan is to go home with home health after discharge when medically ready.    Interval Followup: No acute events overnight.  Still with good urine output.  Lower extremity and scrotal swelling continues to improve.  Feels his breathing continues to improve.    Objective   Objective     Vitals:   Temp:  [97.3 °F (36.3 °C)-97.7 °F (36.5 °C)] 97.7 °F (36.5 °C)  Heart Rate:  [65-69] 67  Resp:  [16-18] 18  BP: ()/(49-63) 89/56  Physical Exam    Constitutional: Chronically ill-appearing male, awake and alert   Respiratory: Essentially clear to auscultation bilaterally, nonlabored respirations, improving lower extremity edema, persistent but improving scrotal edema   cardiovascular: RRR, no MRG   Gastrointestinal: Positive bowel sounds, soft, NTND   Neurologic: Oriented x 3, strength symmetric in all extremities, Cranial  Nerves grossly intact to confrontation, speech clear    Result Review    I have personally reviewed the results below:  [x]  Laboratory personally reviewed BMP, CBC, magnesium, phosphorus  []  Microbiology  []  Radiology  []  EKG/Telemetry   []  Cardiology/Vascular   []  Pathology  []  Old records  []  Other:  CBC          12/21/2024    05:34 12/22/2024    05:58 12/23/2024    05:16   CBC   WBC 5.19  5.35  5.73    RBC 4.09  4.14  4.26    Hemoglobin 8.0  8.1  8.6    Hematocrit 27.9  28.4  29.6    MCV 68.2  68.6  69.5    MCH 19.6  19.6  20.2    MCHC 28.7  28.5  29.1    RDW 19.2  20.1  21.1    Platelets 151  147  159      CMP          12/21/2024    05:34 12/22/2024    05:58 12/23/2024    05:16   CMP   Glucose 204  146  129    BUN 7  8  9    Creatinine 0.46  0.50  0.49    EGFR 115.4  112.5  113.2    Sodium 132  135  132    Potassium 3.1  3.2  3.2    Chloride 95  95  95    Calcium 7.7  7.8  8.0    BUN/Creatinine Ratio 15.2  16.0  18.4    Anion Gap 9.3  3.9  6.8        Assessment & Plan   Assessment / Plan   Acute on chronic diastolic congestive heart failure with acute exacerbation  Suicidal ideation  Chronic bilateral foot wounds  Paroxysmal atrial fibrillation  Type 2 diabetes mellitus  Morbid obesity with BMI of 52  Debility with wheelchair dependence  Osteoarthritis of the hip  Chronic venous stasis  Deconditioning  Hypomagnesemia  Buried penis  Hypophosphatemia  Hemorrhoids  Anasarca    Continue to monitor in the hospital for workup and management of the above  Continue Wellbutrin per psychiatry recommendations  Metabolic alkalosis improving, continue Lasix 40 mg IV twice daily, dose Diamox 500 mg IV x 1  Strict I's and O's, trend renal function electrolytes closely on IV diuretics  Continue King catheter for now, whenever we transition off IV diuretics, will attempt voiding trial.  King was placed due to excoriations and wounds and in setting of aggressive diuresis needing strict I's and O's  Continue Tylenol,  baclofen, or Ultram as needed for pain  Wound care following, appreciate assistance  Continue with lower extremity compression  Transition oral iron supplement  Replace potassium orally, 40 mEq 3 times daily  Blood sugars acceptable, continue Lantus 10 units daily, continue with sliding scale insulin  Continue appropriate home medications  Wound care following  PT/OT/social work consulted-plan will be to return home at discharge  Trend renal function and electrolytes with a.m. BMP, magnesium   Trend Hgb and WBC with a.m. CBC     Discussed plan with RN, social work    VTE Prophylaxis:  Pharmacologic & mechanical VTE prophylaxis orders are present.        CODE STATUS:   Level Of Support Discussed With: Patient  Code Status (Patient has no pulse and is not breathing): CPR (Attempt to Resuscitate)  Medical Interventions (Patient has pulse or is breathing): Full Support

## 2024-12-24 LAB
ANION GAP SERPL CALCULATED.3IONS-SCNC: 7.8 MMOL/L (ref 5–15)
BASOPHILS # BLD AUTO: 0.05 10*3/MM3 (ref 0–0.2)
BASOPHILS NFR BLD AUTO: 0.9 % (ref 0–1.5)
BUN SERPL-MCNC: 10 MG/DL (ref 8–23)
BUN/CREAT SERPL: 20.4 (ref 7–25)
CALCIUM SPEC-SCNC: 8.2 MG/DL (ref 8.6–10.5)
CHLORIDE SERPL-SCNC: 98 MMOL/L (ref 98–107)
CO2 SERPL-SCNC: 28.2 MMOL/L (ref 22–29)
CREAT SERPL-MCNC: 0.49 MG/DL (ref 0.76–1.27)
DEPRECATED RDW RBC AUTO: 50.1 FL (ref 37–54)
EGFRCR SERPLBLD CKD-EPI 2021: 113.2 ML/MIN/1.73
EOSINOPHIL # BLD AUTO: 0.25 10*3/MM3 (ref 0–0.4)
EOSINOPHIL NFR BLD AUTO: 4.3 % (ref 0.3–6.2)
ERYTHROCYTE [DISTWIDTH] IN BLOOD BY AUTOMATED COUNT: 21.5 % (ref 12.3–15.4)
GLUCOSE BLDC GLUCOMTR-MCNC: 157 MG/DL (ref 70–99)
GLUCOSE BLDC GLUCOMTR-MCNC: 170 MG/DL (ref 70–99)
GLUCOSE BLDC GLUCOMTR-MCNC: 192 MG/DL (ref 70–99)
GLUCOSE BLDC GLUCOMTR-MCNC: 222 MG/DL (ref 70–99)
GLUCOSE SERPL-MCNC: 196 MG/DL (ref 65–99)
HCT VFR BLD AUTO: 30.7 % (ref 37.5–51)
HGB BLD-MCNC: 8.6 G/DL (ref 13–17.7)
IMM GRANULOCYTES # BLD AUTO: 0.02 10*3/MM3 (ref 0–0.05)
IMM GRANULOCYTES NFR BLD AUTO: 0.3 % (ref 0–0.5)
LYMPHOCYTES # BLD AUTO: 0.99 10*3/MM3 (ref 0.7–3.1)
LYMPHOCYTES NFR BLD AUTO: 17 % (ref 19.6–45.3)
MAGNESIUM SERPL-MCNC: 1.7 MG/DL (ref 1.6–2.4)
MCH RBC QN AUTO: 19.7 PG (ref 26.6–33)
MCHC RBC AUTO-ENTMCNC: 28 G/DL (ref 31.5–35.7)
MCV RBC AUTO: 70.3 FL (ref 79–97)
MONOCYTES # BLD AUTO: 0.7 10*3/MM3 (ref 0.1–0.9)
MONOCYTES NFR BLD AUTO: 12 % (ref 5–12)
NEUTROPHILS NFR BLD AUTO: 3.8 10*3/MM3 (ref 1.7–7)
NEUTROPHILS NFR BLD AUTO: 65.5 % (ref 42.7–76)
NRBC BLD AUTO-RTO: 0 /100 WBC (ref 0–0.2)
PHOSPHATE SERPL-MCNC: 3.4 MG/DL (ref 2.5–4.5)
PLATELET # BLD AUTO: 150 10*3/MM3 (ref 140–450)
POTASSIUM SERPL-SCNC: 3.6 MMOL/L (ref 3.5–5.2)
RBC # BLD AUTO: 4.37 10*6/MM3 (ref 4.14–5.8)
SODIUM SERPL-SCNC: 134 MMOL/L (ref 136–145)
WBC NRBC COR # BLD AUTO: 5.81 10*3/MM3 (ref 3.4–10.8)

## 2024-12-24 PROCEDURE — 84100 ASSAY OF PHOSPHORUS: CPT | Performed by: INTERNAL MEDICINE

## 2024-12-24 PROCEDURE — 25010000002 FUROSEMIDE PER 20 MG: Performed by: INTERNAL MEDICINE

## 2024-12-24 PROCEDURE — 80048 BASIC METABOLIC PNL TOTAL CA: CPT | Performed by: INTERNAL MEDICINE

## 2024-12-24 PROCEDURE — 83735 ASSAY OF MAGNESIUM: CPT | Performed by: INTERNAL MEDICINE

## 2024-12-24 PROCEDURE — 82948 REAGENT STRIP/BLOOD GLUCOSE: CPT | Performed by: HOSPITALIST

## 2024-12-24 PROCEDURE — 63710000001 INSULIN LISPRO (HUMAN) PER 5 UNITS: Performed by: HOSPITALIST

## 2024-12-24 PROCEDURE — 82948 REAGENT STRIP/BLOOD GLUCOSE: CPT

## 2024-12-24 PROCEDURE — 63710000001 INSULIN GLARGINE PER 5 UNITS: Performed by: INTERNAL MEDICINE

## 2024-12-24 PROCEDURE — 85025 COMPLETE CBC W/AUTO DIFF WBC: CPT | Performed by: INTERNAL MEDICINE

## 2024-12-24 PROCEDURE — 99232 SBSQ HOSP IP/OBS MODERATE 35: CPT | Performed by: INTERNAL MEDICINE

## 2024-12-24 RX ORDER — OXYCODONE HYDROCHLORIDE 5 MG/1
5 TABLET ORAL EVERY 8 HOURS PRN
Status: DISCONTINUED | OUTPATIENT
Start: 2024-12-24 | End: 2025-01-05

## 2024-12-24 RX ORDER — OXYCODONE HYDROCHLORIDE 5 MG/1
5 TABLET ORAL ONCE
Status: COMPLETED | OUTPATIENT
Start: 2024-12-25 | End: 2024-12-24

## 2024-12-24 RX ORDER — POTASSIUM CHLORIDE 750 MG/1
40 CAPSULE, EXTENDED RELEASE ORAL ONCE
Status: COMPLETED | OUTPATIENT
Start: 2024-12-24 | End: 2024-12-24

## 2024-12-24 RX ADMIN — INSULIN LISPRO 2 UNITS: 100 INJECTION, SOLUTION INTRAVENOUS; SUBCUTANEOUS at 08:24

## 2024-12-24 RX ADMIN — INSULIN LISPRO 2 UNITS: 100 INJECTION, SOLUTION INTRAVENOUS; SUBCUTANEOUS at 17:38

## 2024-12-24 RX ADMIN — Medication 10 ML: at 08:25

## 2024-12-24 RX ADMIN — TRAMADOL HYDROCHLORIDE 100 MG: 50 TABLET, COATED ORAL at 20:18

## 2024-12-24 RX ADMIN — APIXABAN 5 MG: 5 TABLET, FILM COATED ORAL at 08:25

## 2024-12-24 RX ADMIN — INSULIN LISPRO 2 UNITS: 100 INJECTION, SOLUTION INTRAVENOUS; SUBCUTANEOUS at 12:14

## 2024-12-24 RX ADMIN — OXYCODONE 5 MG: 5 TABLET ORAL at 22:46

## 2024-12-24 RX ADMIN — FUROSEMIDE 40 MG: 10 INJECTION, SOLUTION INTRAMUSCULAR; INTRAVENOUS at 20:19

## 2024-12-24 RX ADMIN — BUPROPION HYDROCHLORIDE 150 MG: 150 TABLET, EXTENDED RELEASE ORAL at 08:25

## 2024-12-24 RX ADMIN — DIGOXIN 125 MCG: 250 TABLET ORAL at 12:14

## 2024-12-24 RX ADMIN — OXYCODONE 5 MG: 5 TABLET ORAL at 23:47

## 2024-12-24 RX ADMIN — ATORVASTATIN CALCIUM 10 MG: 10 TABLET, FILM COATED ORAL at 20:18

## 2024-12-24 RX ADMIN — POTASSIUM CHLORIDE 40 MEQ: 750 CAPSULE, EXTENDED RELEASE ORAL at 17:38

## 2024-12-24 RX ADMIN — BACLOFEN 20 MG: 10 TABLET ORAL at 10:18

## 2024-12-24 RX ADMIN — SERTRALINE 50 MG: 50 TABLET, FILM COATED ORAL at 20:18

## 2024-12-24 RX ADMIN — METOPROLOL SUCCINATE 12.5 MG: 25 TABLET, EXTENDED RELEASE ORAL at 08:25

## 2024-12-24 RX ADMIN — Medication 10 ML: at 20:20

## 2024-12-24 RX ADMIN — BACLOFEN 20 MG: 10 TABLET ORAL at 20:18

## 2024-12-24 RX ADMIN — TRAMADOL HYDROCHLORIDE 100 MG: 50 TABLET, COATED ORAL at 10:18

## 2024-12-24 RX ADMIN — APIXABAN 5 MG: 5 TABLET, FILM COATED ORAL at 20:19

## 2024-12-24 RX ADMIN — OXYCODONE 5 MG: 5 TABLET ORAL at 14:44

## 2024-12-24 RX ADMIN — TRAMADOL HYDROCHLORIDE 100 MG: 50 TABLET, COATED ORAL at 01:38

## 2024-12-24 RX ADMIN — WHITE PETROLATUM 1 APPLICATION: 1.75 OINTMENT TOPICAL at 08:31

## 2024-12-24 RX ADMIN — INSULIN LISPRO 4 UNITS: 100 INJECTION, SOLUTION INTRAVENOUS; SUBCUTANEOUS at 20:19

## 2024-12-24 RX ADMIN — INSULIN GLARGINE 10 UNITS: 100 INJECTION, SOLUTION SUBCUTANEOUS at 08:24

## 2024-12-24 RX ADMIN — BACLOFEN 20 MG: 10 TABLET ORAL at 01:38

## 2024-12-24 RX ADMIN — FUROSEMIDE 40 MG: 10 INJECTION, SOLUTION INTRAMUSCULAR; INTRAVENOUS at 08:24

## 2024-12-24 NOTE — PROGRESS NOTES
[facility]   Hospitalist Progress Note  Date: 2024  Patient Name: Jose Shaikh  : 1960  MRN: [id number]  Date of admission: 2024      Subjective   Subjective     Chief Complaint: Lower extremity swelling    Summary: 64-year-old man who presents to the emergency room for worsening lower extremity swelling, shortness of breath, and suicidal ideation.  Patient also states that he does not have anybody at home to take care of him.  He has chronic leg wounds.  He has a history of diabetes atrial fibrillation ulcers.  The patient states that he is going to take all his muscle relaxants to kill himself because he cannot go on living like this.  He was admitted for further care, psychiatry was consulted.  Patient was no longer suicidal after he was admitted to the hospital and bedside sitter was discontinued.  He was started on Wellbutrin per psychiatry recommendations.  He was diuresed aggressively, King catheter was placed by urology.  Wound care following.  Can have a voiding trial prior to discharge.  Having good urine output with over 25 L of fluid removed since admission.  Plan is to go home with home health after discharge when medically ready.    Interval Followup:   Acute events noted overnight.  Patient with a documented 7.1 L out over the past 24 hours, net -30.5 L since admission.  Patient remains on scheduled diuretics.  King catheter remains in place at this time.  On rounds patient complaining heavily of pain, at home only has muscle relaxers available.  Previous admissions indicate patient had been on opiates however weaned off.  Overnight it appears patient received 2 mg of morphine with significant improvement in his symptoms.    Objective   Objective     Vitals:   Temp:  [97.5 °F (36.4 °C)-98.2 °F (36.8 °C)] 97.5 °F (36.4 °C)  Heart Rate:  [58-65] 65  Resp:  [16-18] 18  BP: (102-113)/(49-63) 103/49  Physical Exam    Constitutional: Chronically ill-appearing male, awake and  alert   Respiratory: Minimal crackles noted breathing comfortably on room air   cardiovascular: RRR, no MRG   Gastrointestinal: Positive bowel sounds, soft, NTND   Neurologic: Oriented x 3, strength symmetric in all extremities, Cranial Nerves grossly intact to confrontation, speech clear    Result Review    I have personally reviewed the results below:  [x]  Laboratory personally reviewed BMP, CBC, magnesium, phosphorus  []  Microbiology  []  Radiology  []  EKG/Telemetry   []  Cardiology/Vascular   []  Pathology  []  Old records  []  Other:  CBC          12/22/2024    05:58 12/23/2024    05:16 12/24/2024    05:30   CBC   WBC 5.35  5.73  5.81    RBC 4.14  4.26  4.37    Hemoglobin 8.1  8.6  8.6    Hematocrit 28.4  29.6  30.7    MCV 68.6  69.5  70.3    MCH 19.6  20.2  19.7    MCHC 28.5  29.1  28.0    RDW 20.1  21.1  21.5    Platelets 147  159  150      CMP          12/22/2024    05:58 12/23/2024    05:16 12/24/2024    05:30   CMP   Glucose 146  129  196    BUN 8  9  10    Creatinine 0.50  0.49  0.49    EGFR 112.5  113.2  113.2    Sodium 135  132  134    Potassium 3.2  3.2  3.6    Chloride 95  95  98    Calcium 7.8  8.0  8.2    BUN/Creatinine Ratio 16.0  18.4  20.4    Anion Gap 3.9  6.8  7.8        Assessment & Plan   Assessment / Plan   Acute on chronic diastolic congestive heart failure with acute exacerbation  Suicidal ideation  Chronic bilateral foot wounds  Paroxysmal atrial fibrillation  Type 2 diabetes mellitus  Morbid obesity with BMI of 52  Debility with wheelchair dependence  Osteoarthritis of the hip  Chronic venous stasis  Deconditioning  Hypomagnesemia  Buried penis  Hypophosphatemia  Hemorrhoids  Anasarca    Continue to monitor in the hospital for workup and management of the above  Continue Wellbutrin per psychiatry recommendations  Metabolic alkalosis resolved, continue Lasix 40 mg IV twice daily, no further Diamox and repeat labs in the morning  Strict I's and O's, trend renal function electrolytes  closely on IV diuretics  Continue King catheter for now, whenever we transition off IV diuretics, will attempt voiding trial.  King was placed due to excoriations and wounds and in setting of aggressive diuresis needing strict I's and O's  Continue Tylenol, baclofen, or Ultram as needed for pain  Wound care following, appreciate assistance  Continue with lower extremity compression  Transition oral iron supplement  Potassium up to 3.6 today.  Finding additional 40 mill equivalents today given ongoing diuresis  Blood sugars acceptable, continue Lantus 10 units daily, continue with sliding scale insulin  Continue appropriate home medications  Patient significant pain today, will have low-dose oral oxycodone available every 8 hours as needed  Wound care following  PT/OT/social work consulted-plan will be to return home at discharge  Trend renal function and electrolytes with a.m. BMP, magnesium   Trend Hgb and WBC with a.m. CBC     Discussed plan with RN    VTE Prophylaxis:  Pharmacologic & mechanical VTE prophylaxis orders are present.        CODE STATUS:   Level Of Support Discussed With: Patient  Code Status (Patient has no pulse and is not breathing): CPR (Attempt to Resuscitate)  Medical Interventions (Patient has pulse or is breathing): Full Support

## 2024-12-24 NOTE — PLAN OF CARE
Goal Outcome Evaluation:  Plan of Care Reviewed With: patient        Progress: no change  Outcome Evaluation: Patient A/Ox4. On room air. PRN Tramadol and Oxycodone administered as ordered for pain. PRN Baclofen administered as ordered for muscle spasms. Wound care on right leg completed as ordered. Pt refused wound care on left leg. Refuses turns. Educated on importance of turning. King catheter continued. No other issues/needs at this point.

## 2024-12-24 NOTE — PLAN OF CARE
Goal Outcome Evaluation:           Progress: no change  Outcome Evaluation: Pt frequently calls out for pain meds. He says that his tramadol is not helping. Provider was notified and a one-time dose of morphine was given. Pt continues to refuse wound care, q2 turns, and bowel regimen. No iother needs or issues noted at this time.

## 2024-12-25 LAB
ANION GAP SERPL CALCULATED.3IONS-SCNC: 5.2 MMOL/L (ref 5–15)
ANISOCYTOSIS BLD QL: NORMAL
BASOPHILS # BLD AUTO: 0.04 10*3/MM3 (ref 0–0.2)
BASOPHILS NFR BLD AUTO: 0.7 % (ref 0–1.5)
BUN SERPL-MCNC: 10 MG/DL (ref 8–23)
BUN/CREAT SERPL: 18.2 (ref 7–25)
CALCIUM SPEC-SCNC: 8.3 MG/DL (ref 8.6–10.5)
CHLORIDE SERPL-SCNC: 102 MMOL/L (ref 98–107)
CO2 SERPL-SCNC: 28.8 MMOL/L (ref 22–29)
CREAT SERPL-MCNC: 0.55 MG/DL (ref 0.76–1.27)
DEPRECATED RDW RBC AUTO: 50.8 FL (ref 37–54)
EGFRCR SERPLBLD CKD-EPI 2021: 109.3 ML/MIN/1.73
ELLIPTOCYTES BLD QL SMEAR: NORMAL
EOSINOPHIL # BLD AUTO: 0.18 10*3/MM3 (ref 0–0.4)
EOSINOPHIL NFR BLD AUTO: 3.2 % (ref 0.3–6.2)
ERYTHROCYTE [DISTWIDTH] IN BLOOD BY AUTOMATED COUNT: 21.6 % (ref 12.3–15.4)
GLUCOSE BLDC GLUCOMTR-MCNC: 125 MG/DL (ref 70–99)
GLUCOSE BLDC GLUCOMTR-MCNC: 135 MG/DL (ref 70–99)
GLUCOSE BLDC GLUCOMTR-MCNC: 138 MG/DL (ref 70–99)
GLUCOSE BLDC GLUCOMTR-MCNC: 217 MG/DL (ref 70–99)
GLUCOSE SERPL-MCNC: 141 MG/DL (ref 65–99)
HCT VFR BLD AUTO: 30.4 % (ref 37.5–51)
HGB BLD-MCNC: 8.7 G/DL (ref 13–17.7)
IMM GRANULOCYTES # BLD AUTO: 0.02 10*3/MM3 (ref 0–0.05)
IMM GRANULOCYTES NFR BLD AUTO: 0.4 % (ref 0–0.5)
LYMPHOCYTES # BLD AUTO: 0.98 10*3/MM3 (ref 0.7–3.1)
LYMPHOCYTES NFR BLD AUTO: 17.2 % (ref 19.6–45.3)
MACROCYTES BLD QL SMEAR: NORMAL
MAGNESIUM SERPL-MCNC: 1.7 MG/DL (ref 1.6–2.4)
MCH RBC QN AUTO: 20 PG (ref 26.6–33)
MCHC RBC AUTO-ENTMCNC: 28.6 G/DL (ref 31.5–35.7)
MCV RBC AUTO: 69.7 FL (ref 79–97)
MICROCYTES BLD QL: NORMAL
MONOCYTES # BLD AUTO: 0.76 10*3/MM3 (ref 0.1–0.9)
MONOCYTES NFR BLD AUTO: 13.3 % (ref 5–12)
NEUTROPHILS NFR BLD AUTO: 3.73 10*3/MM3 (ref 1.7–7)
NEUTROPHILS NFR BLD AUTO: 65.2 % (ref 42.7–76)
NRBC BLD AUTO-RTO: 0 /100 WBC (ref 0–0.2)
PHOSPHATE SERPL-MCNC: 3.3 MG/DL (ref 2.5–4.5)
PLAT MORPH BLD: NORMAL
PLATELET # BLD AUTO: 148 10*3/MM3 (ref 140–450)
PMV BLD AUTO: ABNORMAL FL
POIKILOCYTOSIS BLD QL SMEAR: NORMAL
POTASSIUM SERPL-SCNC: 4 MMOL/L (ref 3.5–5.2)
RBC # BLD AUTO: 4.36 10*6/MM3 (ref 4.14–5.8)
SODIUM SERPL-SCNC: 136 MMOL/L (ref 136–145)
WBC MORPH BLD: NORMAL
WBC NRBC COR # BLD AUTO: 5.71 10*3/MM3 (ref 3.4–10.8)

## 2024-12-25 PROCEDURE — 25010000002 MAGNESIUM SULFATE 2 GM/50ML SOLUTION: Performed by: STUDENT IN AN ORGANIZED HEALTH CARE EDUCATION/TRAINING PROGRAM

## 2024-12-25 PROCEDURE — 85007 BL SMEAR W/DIFF WBC COUNT: CPT | Performed by: INTERNAL MEDICINE

## 2024-12-25 PROCEDURE — 83735 ASSAY OF MAGNESIUM: CPT | Performed by: INTERNAL MEDICINE

## 2024-12-25 PROCEDURE — 25010000002 FUROSEMIDE PER 20 MG: Performed by: INTERNAL MEDICINE

## 2024-12-25 PROCEDURE — 82948 REAGENT STRIP/BLOOD GLUCOSE: CPT | Performed by: HOSPITALIST

## 2024-12-25 PROCEDURE — 63710000001 INSULIN LISPRO (HUMAN) PER 5 UNITS: Performed by: HOSPITALIST

## 2024-12-25 PROCEDURE — 63710000001 INSULIN GLARGINE PER 5 UNITS: Performed by: INTERNAL MEDICINE

## 2024-12-25 PROCEDURE — 85025 COMPLETE CBC W/AUTO DIFF WBC: CPT | Performed by: INTERNAL MEDICINE

## 2024-12-25 PROCEDURE — 99232 SBSQ HOSP IP/OBS MODERATE 35: CPT | Performed by: STUDENT IN AN ORGANIZED HEALTH CARE EDUCATION/TRAINING PROGRAM

## 2024-12-25 PROCEDURE — 82948 REAGENT STRIP/BLOOD GLUCOSE: CPT

## 2024-12-25 PROCEDURE — 84100 ASSAY OF PHOSPHORUS: CPT | Performed by: INTERNAL MEDICINE

## 2024-12-25 PROCEDURE — 80048 BASIC METABOLIC PNL TOTAL CA: CPT | Performed by: INTERNAL MEDICINE

## 2024-12-25 RX ORDER — MAGNESIUM SULFATE HEPTAHYDRATE 40 MG/ML
2 INJECTION, SOLUTION INTRAVENOUS ONCE
Status: COMPLETED | OUTPATIENT
Start: 2024-12-25 | End: 2024-12-25

## 2024-12-25 RX ORDER — FUROSEMIDE 10 MG/ML
40 INJECTION INTRAMUSCULAR; INTRAVENOUS
Status: DISCONTINUED | OUTPATIENT
Start: 2024-12-25 | End: 2024-12-28

## 2024-12-25 RX ORDER — POTASSIUM CHLORIDE 1.5 G/1.58G
20 POWDER, FOR SOLUTION ORAL ONCE
Status: COMPLETED | OUTPATIENT
Start: 2024-12-25 | End: 2024-12-25

## 2024-12-25 RX ADMIN — APIXABAN 5 MG: 5 TABLET, FILM COATED ORAL at 20:39

## 2024-12-25 RX ADMIN — TRAMADOL HYDROCHLORIDE 100 MG: 50 TABLET, COATED ORAL at 04:46

## 2024-12-25 RX ADMIN — ATORVASTATIN CALCIUM 10 MG: 10 TABLET, FILM COATED ORAL at 20:39

## 2024-12-25 RX ADMIN — FUROSEMIDE 40 MG: 10 INJECTION, SOLUTION INTRAMUSCULAR; INTRAVENOUS at 17:43

## 2024-12-25 RX ADMIN — BACLOFEN 20 MG: 10 TABLET ORAL at 17:42

## 2024-12-25 RX ADMIN — SERTRALINE 50 MG: 50 TABLET, FILM COATED ORAL at 20:39

## 2024-12-25 RX ADMIN — OXYCODONE 5 MG: 5 TABLET ORAL at 09:09

## 2024-12-25 RX ADMIN — WHITE PETROLATUM 1 APPLICATION: 1.75 OINTMENT TOPICAL at 11:34

## 2024-12-25 RX ADMIN — INSULIN LISPRO 4 UNITS: 100 INJECTION, SOLUTION INTRAVENOUS; SUBCUTANEOUS at 20:39

## 2024-12-25 RX ADMIN — Medication 10 ML: at 09:09

## 2024-12-25 RX ADMIN — DIGOXIN 125 MCG: 250 TABLET ORAL at 11:49

## 2024-12-25 RX ADMIN — MAGNESIUM SULFATE HEPTAHYDRATE 2 G: 40 INJECTION, SOLUTION INTRAVENOUS at 09:08

## 2024-12-25 RX ADMIN — FUROSEMIDE 40 MG: 10 INJECTION, SOLUTION INTRAMUSCULAR; INTRAVENOUS at 09:09

## 2024-12-25 RX ADMIN — POTASSIUM CHLORIDE 20 MEQ: 1.5 POWDER, FOR SOLUTION ORAL at 09:09

## 2024-12-25 RX ADMIN — INSULIN GLARGINE 10 UNITS: 100 INJECTION, SOLUTION SUBCUTANEOUS at 09:10

## 2024-12-25 RX ADMIN — APIXABAN 5 MG: 5 TABLET, FILM COATED ORAL at 09:09

## 2024-12-25 RX ADMIN — Medication 10 ML: at 20:39

## 2024-12-25 RX ADMIN — BACLOFEN 20 MG: 10 TABLET ORAL at 04:46

## 2024-12-25 RX ADMIN — BUPROPION HYDROCHLORIDE 150 MG: 150 TABLET, EXTENDED RELEASE ORAL at 09:09

## 2024-12-25 RX ADMIN — OXYCODONE 5 MG: 5 TABLET ORAL at 17:43

## 2024-12-25 NOTE — PROGRESS NOTES
[facility]   Hospitalist Progress Note  Date: 2024  Patient Name: Jose Shaikh  : 1960  MRN: [id number]  Date of admission: 2024      Subjective   Subjective     Chief Complaint: Lower extremity swelling    Summary: 64-year-old man who presents to the emergency room for worsening lower extremity swelling, shortness of breath, and suicidal ideation.  Patient also states that he does not have anybody at home to take care of him.  He has chronic leg wounds.  He has a history of diabetes atrial fibrillation ulcers.  The patient states that he is going to take all his muscle relaxants to kill himself because he cannot go on living like this.  He was admitted for further care, psychiatry was consulted.  Patient was no longer suicidal after he was admitted to the hospital and bedside sitter was discontinued.  He was started on Wellbutrin per psychiatry recommendations.  He was diuresed aggressively, King catheter was placed by urology.  Wound care following.  Can have a voiding trial prior to discharge.  Having good urine output with over 25 L of fluid removed since admission.  Plan is to go home with home health after discharge when medically ready.    Interval Followup:     Patient without new complaints.    Objective   Objective     Vitals:   Temp:  [97.3 °F (36.3 °C)-97.7 °F (36.5 °C)] 97.5 °F (36.4 °C)  Heart Rate:  [60-69] 69  Resp:  [18] 18  BP: (100-120)/(48-61) 120/52  Physical Exam   General: Obese, no acute distress  Respiratory: Normal work of breathing  Cardiovascular: Normal radial pulse  MSK: Bilateral lower extremities in Ace bandages, unable to assess volume  Neurologic: Alert and oriented    Result Review    I have personally reviewed the results below:  [x]  Laboratory personally reviewed BMP, CBC, magnesium, phosphorus  []  Microbiology  []  Radiology  []  EKG/Telemetry   []  Cardiology/Vascular   []  Pathology  []  Old records  []  Other:  CBC          2024    05:16  12/24/2024    05:30 12/25/2024    04:25   CBC   WBC 5.73  5.81  5.71    RBC 4.26  4.37  4.36    Hemoglobin 8.6  8.6  8.7    Hematocrit 29.6  30.7  30.4    MCV 69.5  70.3  69.7    MCH 20.2  19.7  20.0    MCHC 29.1  28.0  28.6    RDW 21.1  21.5  21.6    Platelets 159  150  148      CMP          12/23/2024    05:16 12/24/2024    05:30 12/25/2024    04:25   CMP   Glucose 129  196  141    BUN 9  10  10    Creatinine 0.49  0.49  0.55    EGFR 113.2  113.2  109.3    Sodium 132  134  136    Potassium 3.2  3.6  4.0    Chloride 95  98  102    Calcium 8.0  8.2  8.3    BUN/Creatinine Ratio 18.4  20.4  18.2    Anion Gap 6.8  7.8  5.2        Assessment & Plan   Assessment / Plan   Acute on chronic diastolic congestive heart failure with acute exacerbation  Suicidal ideation  Chronic bilateral foot wounds  Paroxysmal atrial fibrillation  Type 2 diabetes mellitus  Morbid obesity with BMI of 52  Debility with wheelchair dependence  Osteoarthritis of the hip  Chronic venous stasis  Deconditioning  Hypomagnesemia  Buried penis  Hypophosphatemia  Hemorrhoids  Anasarca    Continue to monitor in the hospital for workup and management of the above  Continue Wellbutrin per psychiatry recommendations  Metabolic alkalosis resolved, continue Lasix 40 mg IV twice daily, no further Diamox and repeat labs in the morning  Strict I's and O's, trend renal function electrolytes closely on IV diuretics  Continue King catheter for now, whenever we transition off IV diuretics, will attempt voiding trial.  King was placed due to excoriations and wounds and in setting of aggressive diuresis needing strict I's and O's  Continue Tylenol, baclofen, or Ultram as needed for pain  Wound care following, appreciate assistance  Continue with lower extremity compression  Transition oral iron supplement  Blood sugars acceptable, continue Lantus 10 units daily, continue with sliding scale insulin  Continue appropriate home medications  Wound care  following  PT/OT/social work consulted-plan will be to return home at discharge  Trend renal function and electrolytes with a.m. BMP, magnesium   Trend Hgb and WBC with a.m. CBC     Discussed plan with RN    VTE Prophylaxis:  Pharmacologic & mechanical VTE prophylaxis orders are present.        CODE STATUS:   Level Of Support Discussed With: Patient  Code Status (Patient has no pulse and is not breathing): CPR (Attempt to Resuscitate)  Medical Interventions (Patient has pulse or is breathing): Full Support

## 2024-12-25 NOTE — PLAN OF CARE
Goal Outcome Evaluation:  Plan of Care Reviewed With: patient        Progress: no change  Outcome Evaluation: Patient is alert and oriented x4. Medicated with PRN pain medication per MAR. Blood glucose monitored. Patient continues to refuse turns after frequent education. Wound care performed as ordered. No new issues at this time.

## 2024-12-26 LAB
ANION GAP SERPL CALCULATED.3IONS-SCNC: 7.1 MMOL/L (ref 5–15)
BASOPHILS # BLD AUTO: 0.05 10*3/MM3 (ref 0–0.2)
BASOPHILS NFR BLD AUTO: 0.9 % (ref 0–1.5)
BUN SERPL-MCNC: 9 MG/DL (ref 8–23)
BUN/CREAT SERPL: 19.6 (ref 7–25)
CALCIUM SPEC-SCNC: 8.1 MG/DL (ref 8.6–10.5)
CHLORIDE SERPL-SCNC: 102 MMOL/L (ref 98–107)
CO2 SERPL-SCNC: 27.9 MMOL/L (ref 22–29)
CREAT SERPL-MCNC: 0.46 MG/DL (ref 0.76–1.27)
DEPRECATED RDW RBC AUTO: 54.9 FL (ref 37–54)
EGFRCR SERPLBLD CKD-EPI 2021: 115.4 ML/MIN/1.73
EOSINOPHIL # BLD AUTO: 0.11 10*3/MM3 (ref 0–0.4)
EOSINOPHIL NFR BLD AUTO: 2.1 % (ref 0.3–6.2)
ERYTHROCYTE [DISTWIDTH] IN BLOOD BY AUTOMATED COUNT: 22.5 % (ref 12.3–15.4)
GLUCOSE BLDC GLUCOMTR-MCNC: 145 MG/DL (ref 70–99)
GLUCOSE BLDC GLUCOMTR-MCNC: 153 MG/DL (ref 70–99)
GLUCOSE BLDC GLUCOMTR-MCNC: 201 MG/DL (ref 70–99)
GLUCOSE BLDC GLUCOMTR-MCNC: 237 MG/DL (ref 70–99)
GLUCOSE SERPL-MCNC: 131 MG/DL (ref 65–99)
HCT VFR BLD AUTO: 30.4 % (ref 37.5–51)
HGB BLD-MCNC: 8.7 G/DL (ref 13–17.7)
IMM GRANULOCYTES # BLD AUTO: 0.02 10*3/MM3 (ref 0–0.05)
IMM GRANULOCYTES NFR BLD AUTO: 0.4 % (ref 0–0.5)
LYMPHOCYTES # BLD AUTO: 0.84 10*3/MM3 (ref 0.7–3.1)
LYMPHOCYTES NFR BLD AUTO: 15.9 % (ref 19.6–45.3)
MAGNESIUM SERPL-MCNC: 1.7 MG/DL (ref 1.6–2.4)
MCH RBC QN AUTO: 20 PG (ref 26.6–33)
MCHC RBC AUTO-ENTMCNC: 28.6 G/DL (ref 31.5–35.7)
MCV RBC AUTO: 70 FL (ref 79–97)
MONOCYTES # BLD AUTO: 0.68 10*3/MM3 (ref 0.1–0.9)
MONOCYTES NFR BLD AUTO: 12.9 % (ref 5–12)
NEUTROPHILS NFR BLD AUTO: 3.58 10*3/MM3 (ref 1.7–7)
NEUTROPHILS NFR BLD AUTO: 67.8 % (ref 42.7–76)
NRBC BLD AUTO-RTO: 0 /100 WBC (ref 0–0.2)
PHOSPHATE SERPL-MCNC: 2.9 MG/DL (ref 2.5–4.5)
PLATELET # BLD AUTO: 139 10*3/MM3 (ref 140–450)
PMV BLD AUTO: ABNORMAL FL
POTASSIUM SERPL-SCNC: 3.6 MMOL/L (ref 3.5–5.2)
RBC # BLD AUTO: 4.34 10*6/MM3 (ref 4.14–5.8)
SODIUM SERPL-SCNC: 137 MMOL/L (ref 136–145)
WBC NRBC COR # BLD AUTO: 5.28 10*3/MM3 (ref 3.4–10.8)

## 2024-12-26 PROCEDURE — 63710000001 INSULIN GLARGINE PER 5 UNITS: Performed by: INTERNAL MEDICINE

## 2024-12-26 PROCEDURE — 63710000001 INSULIN LISPRO (HUMAN) PER 5 UNITS: Performed by: HOSPITALIST

## 2024-12-26 PROCEDURE — 82948 REAGENT STRIP/BLOOD GLUCOSE: CPT | Performed by: HOSPITALIST

## 2024-12-26 PROCEDURE — 83735 ASSAY OF MAGNESIUM: CPT | Performed by: INTERNAL MEDICINE

## 2024-12-26 PROCEDURE — 25010000002 FUROSEMIDE PER 20 MG: Performed by: INTERNAL MEDICINE

## 2024-12-26 PROCEDURE — 85025 COMPLETE CBC W/AUTO DIFF WBC: CPT | Performed by: INTERNAL MEDICINE

## 2024-12-26 PROCEDURE — 99232 SBSQ HOSP IP/OBS MODERATE 35: CPT | Performed by: INTERNAL MEDICINE

## 2024-12-26 PROCEDURE — 84100 ASSAY OF PHOSPHORUS: CPT | Performed by: INTERNAL MEDICINE

## 2024-12-26 PROCEDURE — 80048 BASIC METABOLIC PNL TOTAL CA: CPT | Performed by: INTERNAL MEDICINE

## 2024-12-26 PROCEDURE — 82948 REAGENT STRIP/BLOOD GLUCOSE: CPT

## 2024-12-26 RX ADMIN — BACLOFEN 20 MG: 10 TABLET ORAL at 02:22

## 2024-12-26 RX ADMIN — ATORVASTATIN CALCIUM 10 MG: 10 TABLET, FILM COATED ORAL at 20:37

## 2024-12-26 RX ADMIN — BACLOFEN 20 MG: 10 TABLET ORAL at 20:37

## 2024-12-26 RX ADMIN — INSULIN LISPRO 4 UNITS: 100 INJECTION, SOLUTION INTRAVENOUS; SUBCUTANEOUS at 20:37

## 2024-12-26 RX ADMIN — APIXABAN 5 MG: 5 TABLET, FILM COATED ORAL at 20:37

## 2024-12-26 RX ADMIN — INSULIN LISPRO 4 UNITS: 100 INJECTION, SOLUTION INTRAVENOUS; SUBCUTANEOUS at 17:44

## 2024-12-26 RX ADMIN — APIXABAN 5 MG: 5 TABLET, FILM COATED ORAL at 10:37

## 2024-12-26 RX ADMIN — OXYCODONE 5 MG: 5 TABLET ORAL at 02:22

## 2024-12-26 RX ADMIN — DIGOXIN 125 MCG: 250 TABLET ORAL at 12:28

## 2024-12-26 RX ADMIN — BUPROPION HYDROCHLORIDE 150 MG: 150 TABLET, EXTENDED RELEASE ORAL at 10:37

## 2024-12-26 RX ADMIN — INSULIN GLARGINE 10 UNITS: 100 INJECTION, SOLUTION SUBCUTANEOUS at 10:38

## 2024-12-26 RX ADMIN — BACLOFEN 20 MG: 10 TABLET ORAL at 10:37

## 2024-12-26 RX ADMIN — OXYCODONE 5 MG: 5 TABLET ORAL at 20:37

## 2024-12-26 RX ADMIN — Medication 10 ML: at 20:37

## 2024-12-26 RX ADMIN — METOPROLOL SUCCINATE 12.5 MG: 25 TABLET, EXTENDED RELEASE ORAL at 10:37

## 2024-12-26 RX ADMIN — FUROSEMIDE 40 MG: 10 INJECTION, SOLUTION INTRAMUSCULAR; INTRAVENOUS at 17:43

## 2024-12-26 RX ADMIN — OXYCODONE 5 MG: 5 TABLET ORAL at 10:35

## 2024-12-26 RX ADMIN — SERTRALINE 50 MG: 50 TABLET, FILM COATED ORAL at 20:37

## 2024-12-26 RX ADMIN — Medication 10 ML: at 10:38

## 2024-12-26 RX ADMIN — FUROSEMIDE 40 MG: 10 INJECTION, SOLUTION INTRAMUSCULAR; INTRAVENOUS at 10:37

## 2024-12-26 NOTE — PROGRESS NOTES
[facility]   Hospitalist Progress Note  Date: 2024  Patient Name: Jose Shaikh  : 1960  MRN: [id number]  Date of admission: 2024      Subjective   Subjective     Chief Complaint: Lower extremity swelling    Summary: 64-year-old man who presents to the emergency room for worsening lower extremity swelling, shortness of breath, and suicidal ideation.  Patient also states that he does not have anybody at home to take care of him.  He has chronic leg wounds.  He has a history of diabetes atrial fibrillation ulcers.  The patient states that he is going to take all his muscle relaxants to kill himself because he cannot go on living like this.  He was admitted for further care, psychiatry was consulted.  Patient was no longer suicidal after he was admitted to the hospital and bedside sitter was discontinued.  He was started on Wellbutrin per psychiatry recommendations.  He was diuresed aggressively, King catheter was placed by urology.  Wound care following.  Can have a voiding trial prior to discharge.  Having good urine output with over 25 L of fluid removed since admission.  Plan is to go home with home health after discharge when medically ready.    Interval Followup:     Patient states he has not been out of the bed.  Patient states he is nonambulatory at baseline  Patient continues to have indwelling King catheter due to urinary output due to Lasix  Patient states that he lives alone and is unable to care for himself.      Objective   Objective     Vitals:   Temp:  [97.3 °F (36.3 °C)-97.9 °F (36.6 °C)] 97.9 °F (36.6 °C)  Heart Rate:  [69-77] 77  Resp:  [18] 18  BP: (105-122)/(38-67) 105/67  Physical Exam   General: Obese, no acute distress, resting in bed   Respiratory: no wheezing   Cardiovascular:RRR, no murmur  Abdomen: obese NT  MSK: Bilateral lower extremities in Ace bandages,  Neurologic: Alert and oriented    Result Review    I have personally reviewed the results below:  [x]   Laboratory personally reviewed BMP, CBC, magnesium, phosphorus  []  Microbiology  []  Radiology  []  EKG/Telemetry   []  Cardiology/Vascular   []  Pathology  []  Old records  []  Other:  CBC          12/24/2024    05:30 12/25/2024    04:25 12/26/2024    05:55   CBC   WBC 5.81  5.71  5.28    RBC 4.37  4.36  4.34    Hemoglobin 8.6  8.7  8.7    Hematocrit 30.7  30.4  30.4    MCV 70.3  69.7  70.0    MCH 19.7  20.0  20.0    MCHC 28.0  28.6  28.6    RDW 21.5  21.6  22.5    Platelets 150  148  139      CMP          12/24/2024    05:30 12/25/2024    04:25 12/26/2024    05:55   CMP   Glucose 196  141  131    BUN 10  10  9    Creatinine 0.49  0.55  0.46    EGFR 113.2  109.3  115.4    Sodium 134  136  137    Potassium 3.6  4.0  3.6    Chloride 98  102  102    Calcium 8.2  8.3  8.1    BUN/Creatinine Ratio 20.4  18.2  19.6    Anion Gap 7.8  5.2  7.1        Assessment & Plan   Assessment / Plan   Acute on chronic diastolic congestive heart failure with acute exacerbation  Suicidal ideation  Chronic bilateral foot wounds  Paroxysmal atrial fibrillation  Type 2 diabetes mellitus  Morbid obesity with BMI of 52  Debility with wheelchair dependence  Osteoarthritis of the hip  Chronic venous stasis  Deconditioning  Hypomagnesemia  Buried penis  Hypophosphatemia  Hemorrhoids  Anasarca    PLAN   Continue to monitor in the hospital for workup and management of the above  Continue Wellbutrin per psychiatry recommendations  Metabolic alkalosis resolved, continue Lasix 40 mg IV twice daily,  Continue King catheter for now, whenever we transition off IV diuretics, will attempt voiding trial.  King was placed due to excoriations and wounds and in setting of aggressive diuresis needing strict I's and O's  Continue Tylenol, baclofen, or Ultram as needed for pain  Wound care following, appreciate assistance. Continue with lower extremity compression  Continue oral iron supplement  Blood sugars acceptable, continue Lantus 10 units daily,  continue with sliding scale insulin  Continue appropriate home medications  PT/OT/social work consulted. Patient states unable to ambulate or care for himself at home   Trend renal function and electrolytes with a.m. BMP, magnesium   Trend Hgb and WBC with a.m. CBC     Discussed plan with RN    VTE Prophylaxis:  Pharmacologic & mechanical VTE prophylaxis orders are present.        CODE STATUS:   Level Of Support Discussed With: Patient  Code Status (Patient has no pulse and is not breathing): CPR (Attempt to Resuscitate)  Medical Interventions (Patient has pulse or is breathing): Full Support

## 2024-12-26 NOTE — PLAN OF CARE
Goal Outcome Evaluation:  Plan of Care Reviewed With: patient        Progress: no change  Outcome Evaluation: Pt is A&O x4 this shift. Pt c/o pain/discomfort this shift, administered pain meds per MAR. Pt has refused turns most of the night and has been refusing multiple times yesterday on dayshift; pt has been educated many times of importance of being turned/repositioned to optimize healing as pt's skin is compromized, pt still refuses at times. Skin care provided as ordered. Falls precaution maintained. Monitoring blood glucose as ordered. No new issues or new needs noted at this time.

## 2024-12-26 NOTE — PLAN OF CARE
Goal Outcome Evaluation:           Progress: no change  Outcome Evaluation: Patient complained of pain and spasms, medicated this shift, refused to have dressing on legs changed, educated the importance of wound care.

## 2024-12-27 LAB
ANION GAP SERPL CALCULATED.3IONS-SCNC: 9 MMOL/L (ref 5–15)
ANISOCYTOSIS BLD QL: NORMAL
BASOPHILS # BLD AUTO: 0.06 10*3/MM3 (ref 0–0.2)
BASOPHILS NFR BLD AUTO: 1.1 % (ref 0–1.5)
BUN SERPL-MCNC: 8 MG/DL (ref 8–23)
BUN/CREAT SERPL: 16.3 (ref 7–25)
CALCIUM SPEC-SCNC: 8.1 MG/DL (ref 8.6–10.5)
CHLORIDE SERPL-SCNC: 96 MMOL/L (ref 98–107)
CO2 SERPL-SCNC: 29 MMOL/L (ref 22–29)
CREAT SERPL-MCNC: 0.49 MG/DL (ref 0.76–1.27)
DEPRECATED RDW RBC AUTO: 55 FL (ref 37–54)
EGFRCR SERPLBLD CKD-EPI 2021: 113.2 ML/MIN/1.73
EOSINOPHIL # BLD AUTO: 0.08 10*3/MM3 (ref 0–0.4)
EOSINOPHIL NFR BLD AUTO: 1.4 % (ref 0.3–6.2)
ERYTHROCYTE [DISTWIDTH] IN BLOOD BY AUTOMATED COUNT: 22.5 % (ref 12.3–15.4)
GLUCOSE BLDC GLUCOMTR-MCNC: 151 MG/DL (ref 70–99)
GLUCOSE BLDC GLUCOMTR-MCNC: 158 MG/DL (ref 70–99)
GLUCOSE BLDC GLUCOMTR-MCNC: 161 MG/DL (ref 70–99)
GLUCOSE BLDC GLUCOMTR-MCNC: 191 MG/DL (ref 70–99)
GLUCOSE SERPL-MCNC: 157 MG/DL (ref 65–99)
HCT VFR BLD AUTO: 33.4 % (ref 37.5–51)
HGB BLD-MCNC: 9.3 G/DL (ref 13–17.7)
HYPOCHROMIA BLD QL: NORMAL
IMM GRANULOCYTES # BLD AUTO: 0.01 10*3/MM3 (ref 0–0.05)
IMM GRANULOCYTES NFR BLD AUTO: 0.2 % (ref 0–0.5)
LYMPHOCYTES # BLD AUTO: 0.93 10*3/MM3 (ref 0.7–3.1)
LYMPHOCYTES NFR BLD AUTO: 16.5 % (ref 19.6–45.3)
MACROCYTES BLD QL SMEAR: NORMAL
MAGNESIUM SERPL-MCNC: 1.7 MG/DL (ref 1.6–2.4)
MCH RBC QN AUTO: 19.8 PG (ref 26.6–33)
MCHC RBC AUTO-ENTMCNC: 27.8 G/DL (ref 31.5–35.7)
MCV RBC AUTO: 71.2 FL (ref 79–97)
MICROCYTES BLD QL: NORMAL
MONOCYTES # BLD AUTO: 0.66 10*3/MM3 (ref 0.1–0.9)
MONOCYTES NFR BLD AUTO: 11.7 % (ref 5–12)
NEUTROPHILS NFR BLD AUTO: 3.89 10*3/MM3 (ref 1.7–7)
NEUTROPHILS NFR BLD AUTO: 69.1 % (ref 42.7–76)
NRBC BLD AUTO-RTO: 0 /100 WBC (ref 0–0.2)
OVALOCYTES BLD QL SMEAR: NORMAL
PHOSPHATE SERPL-MCNC: 2.6 MG/DL (ref 2.5–4.5)
PLAT MORPH BLD: NORMAL
PLATELET # BLD AUTO: 154 10*3/MM3 (ref 140–450)
POIKILOCYTOSIS BLD QL SMEAR: NORMAL
POTASSIUM SERPL-SCNC: 3.2 MMOL/L (ref 3.5–5.2)
RBC # BLD AUTO: 4.69 10*6/MM3 (ref 4.14–5.8)
SODIUM SERPL-SCNC: 134 MMOL/L (ref 136–145)
WBC MORPH BLD: NORMAL
WBC NRBC COR # BLD AUTO: 5.63 10*3/MM3 (ref 3.4–10.8)

## 2024-12-27 PROCEDURE — 85007 BL SMEAR W/DIFF WBC COUNT: CPT | Performed by: INTERNAL MEDICINE

## 2024-12-27 PROCEDURE — 83735 ASSAY OF MAGNESIUM: CPT | Performed by: INTERNAL MEDICINE

## 2024-12-27 PROCEDURE — 82948 REAGENT STRIP/BLOOD GLUCOSE: CPT

## 2024-12-27 PROCEDURE — 25010000002 MAGNESIUM SULFATE 2 GM/50ML SOLUTION: Performed by: INTERNAL MEDICINE

## 2024-12-27 PROCEDURE — 85025 COMPLETE CBC W/AUTO DIFF WBC: CPT | Performed by: INTERNAL MEDICINE

## 2024-12-27 PROCEDURE — 82948 REAGENT STRIP/BLOOD GLUCOSE: CPT | Performed by: HOSPITALIST

## 2024-12-27 PROCEDURE — 63710000001 INSULIN LISPRO (HUMAN) PER 5 UNITS: Performed by: HOSPITALIST

## 2024-12-27 PROCEDURE — 25010000002 FUROSEMIDE PER 20 MG: Performed by: INTERNAL MEDICINE

## 2024-12-27 PROCEDURE — 80048 BASIC METABOLIC PNL TOTAL CA: CPT | Performed by: INTERNAL MEDICINE

## 2024-12-27 PROCEDURE — 99232 SBSQ HOSP IP/OBS MODERATE 35: CPT | Performed by: INTERNAL MEDICINE

## 2024-12-27 PROCEDURE — 84100 ASSAY OF PHOSPHORUS: CPT | Performed by: INTERNAL MEDICINE

## 2024-12-27 PROCEDURE — 25810000003 SODIUM CHLORIDE 0.9 % SOLUTION 500 ML FLEX CONT: Performed by: HOSPITALIST

## 2024-12-27 PROCEDURE — 63710000001 INSULIN GLARGINE PER 5 UNITS: Performed by: INTERNAL MEDICINE

## 2024-12-27 RX ORDER — KETOROLAC TROMETHAMINE 30 MG/ML
15 INJECTION, SOLUTION INTRAMUSCULAR; INTRAVENOUS ONCE
Status: COMPLETED | OUTPATIENT
Start: 2024-12-28 | End: 2024-12-28

## 2024-12-27 RX ORDER — ORPHENADRINE CITRATE 30 MG/ML
60 INJECTION INTRAMUSCULAR; INTRAVENOUS ONCE
Status: COMPLETED | OUTPATIENT
Start: 2024-12-28 | End: 2024-12-28

## 2024-12-27 RX ORDER — POTASSIUM CHLORIDE 7.45 MG/ML
10 INJECTION INTRAVENOUS ONCE
Status: COMPLETED | OUTPATIENT
Start: 2024-12-28 | End: 2024-12-28

## 2024-12-27 RX ORDER — MAGNESIUM SULFATE HEPTAHYDRATE 40 MG/ML
2 INJECTION, SOLUTION INTRAVENOUS ONCE
Status: COMPLETED | OUTPATIENT
Start: 2024-12-27 | End: 2024-12-27

## 2024-12-27 RX ORDER — POTASSIUM CHLORIDE 750 MG/1
40 CAPSULE, EXTENDED RELEASE ORAL ONCE
Status: COMPLETED | OUTPATIENT
Start: 2024-12-27 | End: 2024-12-27

## 2024-12-27 RX ADMIN — SERTRALINE 50 MG: 50 TABLET, FILM COATED ORAL at 20:37

## 2024-12-27 RX ADMIN — SODIUM CHLORIDE 40 ML: 9 INJECTION, SOLUTION INTRAVENOUS at 17:59

## 2024-12-27 RX ADMIN — INSULIN LISPRO 2 UNITS: 100 INJECTION, SOLUTION INTRAVENOUS; SUBCUTANEOUS at 17:57

## 2024-12-27 RX ADMIN — INSULIN LISPRO 2 UNITS: 100 INJECTION, SOLUTION INTRAVENOUS; SUBCUTANEOUS at 20:37

## 2024-12-27 RX ADMIN — INSULIN GLARGINE 10 UNITS: 100 INJECTION, SOLUTION SUBCUTANEOUS at 10:00

## 2024-12-27 RX ADMIN — DIGOXIN 125 MCG: 250 TABLET ORAL at 12:08

## 2024-12-27 RX ADMIN — ATORVASTATIN CALCIUM 10 MG: 10 TABLET, FILM COATED ORAL at 20:36

## 2024-12-27 RX ADMIN — APIXABAN 5 MG: 5 TABLET, FILM COATED ORAL at 10:00

## 2024-12-27 RX ADMIN — Medication 10 ML: at 10:00

## 2024-12-27 RX ADMIN — APIXABAN 5 MG: 5 TABLET, FILM COATED ORAL at 20:36

## 2024-12-27 RX ADMIN — POTASSIUM CHLORIDE 40 MEQ: 750 CAPSULE, EXTENDED RELEASE ORAL at 17:57

## 2024-12-27 RX ADMIN — BUPROPION HYDROCHLORIDE 150 MG: 150 TABLET, EXTENDED RELEASE ORAL at 10:00

## 2024-12-27 RX ADMIN — MAGNESIUM SULFATE HEPTAHYDRATE 2 G: 40 INJECTION, SOLUTION INTRAVENOUS at 17:58

## 2024-12-27 RX ADMIN — BACLOFEN 20 MG: 10 TABLET ORAL at 20:37

## 2024-12-27 RX ADMIN — BACLOFEN 20 MG: 10 TABLET ORAL at 12:08

## 2024-12-27 RX ADMIN — BACLOFEN 20 MG: 10 TABLET ORAL at 03:18

## 2024-12-27 RX ADMIN — FUROSEMIDE 40 MG: 10 INJECTION, SOLUTION INTRAMUSCULAR; INTRAVENOUS at 17:57

## 2024-12-27 RX ADMIN — FUROSEMIDE 40 MG: 10 INJECTION, SOLUTION INTRAMUSCULAR; INTRAVENOUS at 10:00

## 2024-12-27 RX ADMIN — OXYCODONE 5 MG: 5 TABLET ORAL at 12:08

## 2024-12-27 RX ADMIN — Medication 10 ML: at 20:37

## 2024-12-27 RX ADMIN — OXYCODONE 5 MG: 5 TABLET ORAL at 20:36

## 2024-12-27 RX ADMIN — OXYCODONE 5 MG: 5 TABLET ORAL at 03:19

## 2024-12-27 NOTE — PLAN OF CARE
Goal Outcome Evaluation:  Plan of Care Reviewed With: patient        Progress: no change  Outcome Evaluation: Pt A&Ox4 this shift. Pt c/o pain/discomfort this shift, administered pain meds per MAR. Skin and wound care provided as ordered and as needed. Pt has refused turns at times this shift, education was provided to pt of importance of being turned/repositioned to promote comfort and optimize healing, pt verbally understood but would still refuse. F/C care also provided. Falls precaution maintained. VSS. No new issues or new needs noted at this time.

## 2024-12-27 NOTE — SIGNIFICANT NOTE
Patient refused wound care assessment and wound care dressing change; patient reports that it was already done once today and that is all that he would allow. Karon Bolton RN

## 2024-12-27 NOTE — CONSULTS
"Nutrition Services    Patient Name: Jose Shaikh  YOB: 1960  MRN: [id number]  Admission date: 12/18/2024      CLINICAL NUTRITION ASSESSMENT      Reason for Assessment  Follow Up     H&P:  Past Medical History:   Diagnosis Date    Absence of toe of right foot     Acute osteomyelitis of left calcaneus  08/18/2021    Anxiety and depression     Arthritis     Cancer     Chronic pain     STATES HIS PAIN IS 10/10 AAT    Claustrophobia     Corns and callus     Diabetic ulcer of left heel associated with type 2 DM 08/18/2021    Diabetic ulcer of left heel associated with type 2 DM 07/06/2021    Diabetic ulcer of right midfoot associated with type 2 DM 08/18/2021    Difficulty walking     Essential hypertension 08/31/2021    Hammertoe     Hyperlipidemia LDL goal <100 08/31/2021    Ingrown toenail     Obesity     Paroxysmal atrial fibrillation 08/31/2021    Polyneuropathy     Pressure ulcer, stage 1     Tinea unguium     Type 2 diabetes mellitus with polyneuropathy         Current Problems:   Active Hospital Problems    Diagnosis     **CHF exacerbation         Nutrition/Diet History         Narrative   Pt has a good appetite (~75% documented intake). Tolerating diet without GI complaints noted. Double protein provided at meals, as pt does not like supplements. Continue nutrition interventions and RD to follow per protocol.      Anthropometrics        Current Height, Weight Height: 188 cm (74\")  Weight: (!) 187 kg (411 lb 13.1 oz)   Current BMI Body mass index is 52.87 kg/m².   BMI Classification Obese Class III   % % (82.2 kg)    Adjusted Body Weight (ABW) 124.1 kg   Weight Hx  Wt Readings from Last 30 Encounters:   12/19/24 0202 (!) 187 kg (411 lb 13.1 oz)   12/18/24 1455 (!) 195 kg (430 lb 12.5 oz)   12/15/24 1912 (!) 199 kg (438 lb 11.5 oz)   12/15/24 1846 (!) 199 kg (439 lb 6 oz)   12/12/24 0730 (!) 175 kg (384 lb 14.8 oz)   12/06/24 2226 (!) 177 kg (389 lb 12.4 oz)   11/30/24 1436 (!) 174 kg (383 " lb 9.6 oz)   11/25/24 1600 (!) 166 kg (365 lb 15.4 oz)   11/19/24 1534 (!) 167 kg (369 lb)   11/08/24 1527 (!) 168 kg (369 lb 11.4 oz)   10/26/24 0615 (!) 167 kg (368 lb 2.7 oz)   10/26/24 0609 (!) 155 kg (341 lb 11.4 oz)   10/09/24 0950 (!) 155 kg (341 lb 7.9 oz)   10/02/24 0805 (!) 150 kg (330 lb 0.5 oz)   08/05/24 0732 (!) 158 kg (348 lb 15.8 oz)   07/22/24 0800 (!) 156 kg (344 lb 9.3 oz)   07/08/24 0900 (!) 156 kg (343 lb 14.7 oz)   06/27/24 0745 (!) 156 kg (343 lb 4.1 oz)   05/31/24 1400 (!) 151 kg (333 lb 12.4 oz)   05/22/24 1000 (!) 157 kg (346 lb 2 oz)   05/15/24 0545 (!) 153 kg (338 lb 3 oz)   05/08/24 1100 (!) 154 kg (340 lb 6.2 oz)   05/08/24 1029 (!) 154 kg (340 lb 6.2 oz)   05/01/24 1100 (!) 156 kg (343 lb 14.7 oz)   04/24/24 0900 (!) 159 kg (350 lb 1.5 oz)   04/17/24 1124 (!) 161 kg (355 lb 9.6 oz)   04/11/24 1509 (!) 162 kg (356 lb 11.3 oz)   04/02/24 1118 (!) 157 kg (345 lb 0.3 oz)   03/26/24 1003 (!) 143 kg (314 lb 2.5 oz)   03/18/24 1323 (!) 151 kg (332 lb 3.7 oz)   03/18/24 0500 (!) 171 kg (378 lb 1.4 oz)   03/17/24 0500 (!) 173 kg (380 lb 15.3 oz)   03/16/24 0500 (!) 171 kg (376 lb 15.8 oz)   03/15/24 0500 (!) 161 kg (354 lb 8 oz)   03/14/24 0300 (!) 173 kg (382 lb 4.4 oz)   03/12/24 0500 (!) 158 kg (347 lb 14.2 oz)   03/11/24 0500 (!) 153 kg (337 lb 8.4 oz)   03/10/24 0540 (!) 154 kg (339 lb 4.6 oz)   03/09/24 0500 (!) 153 kg (337 lb 1.3 oz)   03/08/24 1323 (!) 153 kg (337 lb 8 oz)   03/08/24 0500 (!) 157 kg (346 lb 2 oz)   03/06/24 2300 (!) 155 kg (342 lb 2.5 oz)   03/05/24 0415 (!) 155 kg (342 lb 13 oz)   03/04/24 0500 (!) 156 kg (344 lb 9.3 oz)   03/03/24 0500 (!) 156 kg (344 lb 9.3 oz)   03/02/24 0530 (!) 157 kg (346 lb 12.5 oz)   03/01/24 0500 (!) 157 kg (345 lb 3.9 oz)   02/29/24 0500 (!) 157 kg (347 lb 3.6 oz)   02/28/24 0509 (!) 158 kg (347 lb 14.2 oz)   02/27/24 0500 (!) 159 kg (351 lb 3.1 oz)   02/26/24 0500 (!) 159 kg (350 lb 12 oz)   02/25/24 0500 (!) 160 kg (351 lb 10.1 oz)    02/24/24 0500 (!) 160 kg (352 lb 1.2 oz)   02/23/24 0500 (!) 160 kg (353 lb 6.4 oz)   02/22/24 0500 (!) 160 kg (353 lb 13.4 oz)   02/21/24 0514 (!) 162 kg (356 lb 7.7 oz)   02/20/24 0500 (!) 161 kg (355 lb 2.6 oz)   02/19/24 0300 (!) 160 kg (353 lb 6.4 oz)   02/18/24 0500 (!) 162 kg (356 lb 7.7 oz)   02/17/24 0500 (!) 162 kg (357 lb 2.3 oz)   02/16/24 0500 (!) 162 kg (358 lb 0.4 oz)   02/15/24 0532 (!) 163 kg (360 lb 3.7 oz)   02/14/24 0600 (!) 162 kg (357 lb 5.9 oz)   02/13/24 0500 (!) 162 kg (357 lb 9.4 oz)   02/12/24 1352 (!) 160 kg (352 lb 4.7 oz)   02/12/24 0500 (!) 166 kg (367 lb 1.1 oz)   02/11/24 0500 (!) 169 kg (372 lb 2.2 oz)   02/10/24 0500 (!) 168 kg (370 lb 9.5 oz)   02/09/24 0600 (!) 168 kg (370 lb 9.5 oz)   02/08/24 0600 (!) 165 kg (363 lb 15.7 oz)   02/07/24 0600 (!) 166 kg (366 lb 10 oz)   02/06/24 0419 (!) 166 kg (366 lb 10 oz)   02/05/24 0500 (!) 167 kg (367 lb 4.6 oz)   02/04/24 0500 (!) 167 kg (367 lb 8.1 oz)   02/03/24 0500 (!) 166 kg (366 lb 6.5 oz)   02/02/24 0500 (!) 168 kg (369 lb 14.9 oz)   02/01/24 0613 (!) 169 kg (372 lb 5.7 oz)   01/31/24 0500 (!) 168 kg (371 lb 4.1 oz)   01/30/24 0500 (!) 169 kg (372 lb 12.8 oz)   01/29/24 0232 (!) 169 kg (372 lb 5.7 oz)   01/28/24 0500 (!) 167 kg (368 lb 6.2 oz)   01/26/24 0400 (!) 169 kg (372 lb 2.2 oz)   01/25/24 0417 (!) 167 kg (368 lb 9.8 oz)   01/24/24 0608 (!) 168 kg (371 lb 7.6 oz)   01/23/24 0500 (!) 168 kg (369 lb 14.9 oz)   01/22/24 0500 (!) 168 kg (371 lb 4.1 oz)   01/21/24 0500 (!) 168 kg (371 lb 4.1 oz)   01/20/24 0500 (!) 168 kg (371 lb 7.6 oz)   01/19/24 0500 (!) 171 kg (376 lb 1.7 oz)   01/18/24 0500 (!) 172 kg (380 lb 1.2 oz)   01/17/24 0614 (!) 172 kg (379 lb 6.6 oz)   01/16/24 0500 (!) 171 kg (378 lb 1.4 oz)   01/15/24 0500 (!) 169 kg (372 lb 2.2 oz)   01/14/24 0546 (!) 169 kg (373 lb 7.4 oz)   01/13/24 0507 (!) 171 kg (376 lb 5.2 oz)   01/12/24 0600 (!) 170 kg (374 lb 12.5 oz)   01/11/24 0600 (!) 169 kg (371 lb 14.7 oz)    01/10/24 0600 (!) 169 kg (371 lb 11.1 oz)   01/09/24 0500 (!) 168 kg (370 lb 9.5 oz)   01/08/24 0448 (!) 168 kg (370 lb 9.5 oz)   01/07/24 0454 (!) 167 kg (367 lb 15.2 oz)   01/06/24 0500 (!) 166 kg (366 lb 10 oz)   01/05/24 0555 (!) 165 kg (364 lb 6.7 oz)   01/04/24 0500 (!) 165 kg (363 lb 12.1 oz)   01/03/24 0500 (!) 166 kg (365 lb 1.3 oz)   01/02/24 0500 (!) 167 kg (367 lb 4.6 oz)   01/01/24 0500 (!) 166 kg (365 lb 11.9 oz)   12/31/23 0500 (!) 160 kg (352 lb 4.7 oz)   12/30/23 0500 (!) 167 kg (367 lb 15.2 oz)   12/29/23 0500 (!) 166 kg (366 lb 10 oz)   12/28/23 0500 (!) 165 kg (364 lb 13.8 oz)   12/27/23 0500 (!) 166 kg (367 lb 1.1 oz)   12/26/23 0500 (!) 167 kg (367 lb 4.6 oz)   12/25/23 0500 (!) 165 kg (364 lb 3.2 oz)   12/24/23 0500 (!) 164 kg (362 lb 7 oz)   12/23/23 0500 (!) 163 kg (360 lb 0.2 oz)   12/22/23 0600 (!) 163 kg (358 lb 14.5 oz)   12/21/23 0500 (!) 163 kg (359 lb 12.7 oz)   12/20/23 0500 (!) 162 kg (357 lb 9.4 oz)   12/19/23 0550 (!) 163 kg (359 lb 5.6 oz)   12/18/23 0500 (!) 161 kg (355 lb 13.2 oz)   12/17/23 0500 (!) 160 kg (353 lb 13.4 oz)   12/16/23 1541 (!) 160 kg (353 lb 3.2 oz)   12/16/23 0500 (!) 165 kg (364 lb 13.8 oz)   12/15/23 0500 (!) 165 kg (362 lb 10.5 oz)   12/14/23 0500 (!) 157 kg (345 lb 14.4 oz)   12/13/23 0500 (!) 153 kg (337 lb 4.9 oz)   12/12/23 0500 (!) 155 kg (341 lb 0.8 oz)   12/11/23 1049 (!) 155 kg (341 lb 0.8 oz)   12/11/23 0500 (!) 160 kg (353 lb 13.4 oz)   12/10/23 0532 (!) 162 kg (356 lb 7.7 oz)   12/09/23 0500 (!) 151 kg (332 lb 10.8 oz)   12/08/23 0500 (!) 153 kg (336 lb 13.8 oz)   12/06/23 0500 (!) 154 kg (340 lb 6.2 oz)   12/05/23 0607 (!) 161 kg (354 lb 15.1 oz)   12/04/23 0500 (!) 161 kg (354 lb 4.5 oz)   12/03/23 0400 (!) 161 kg (354 lb 11.5 oz)   11/21/23 1155 (!) 162 kg (357 lb 12.8 oz)   11/09/23 0500 (!) 160 kg (353 lb 9.9 oz)   11/06/23 1422 (!) 158 kg (348 lb 5.2 oz)   11/02/23 1155 (!) 158 kg (348 lb 15.8 oz)   09/11/23 0030 (!) 151 kg (334 lb)    09/10/23 1844 (!) 154 kg (338 lb 10 oz)   08/30/23 1112 (!) 157 kg (347 lb)   08/01/23 0932 (!) 158 kg (347 lb 10.7 oz)   07/31/23 2347 (!) 163 kg (358 lb 7.5 oz)   06/16/23 2333 (!) 155 kg (342 lb 2.5 oz)   06/16/23 1053 (!) 155 kg (342 lb 3.2 oz)   06/15/23 0505 (!) 149 kg (328 lb 14.4 oz)   06/14/23 0455 (!) 149 kg (328 lb 4.8 oz)   06/13/23 1703 (!) 149 kg (328 lb 11.2 oz)   06/13/23 0600 (!) 170 kg (374 lb 12.5 oz)   06/12/23 0420 (!) 160 kg (352 lb 11.8 oz)   06/11/23 0447 (!) 150 kg (331 lb 5.6 oz)   06/10/23 0500 (!) 150 kg (330 lb 14.6 oz)   06/09/23 0536 (!) 156 kg (343 lb 7.6 oz)   06/08/23 1826 (!) 156 kg (344 lb 11.2 oz)   06/08/23 0522 (!) 158 kg (348 lb 8.8 oz)   06/07/23 0548 (!) 155 kg (342 lb 9.5 oz)   06/06/23 0515 (!) 155 kg (341 lb 14.9 oz)   06/05/23 0445 (!) 155 kg (341 lb 9.6 oz)   06/05/23 0348 (!) 158 kg (349 lb 3.3 oz)   06/04/23 0555 (!) 157 kg (346 lb 3.2 oz)   06/03/23 0539 (!) 158 kg (349 lb)   06/02/23 0548 (!) 163 kg (359 lb 3.2 oz)   06/01/23 0600 (!) 164 kg (362 lb)   05/31/23 0507 (!) 164 kg (362 lb 4.8 oz)   05/30/23 0635 (!) 164 kg (362 lb 7 oz)   05/29/23 0500 (!) 167 kg (368 lb 2.7 oz)   05/28/23 0600 (!) 167 kg (367 lb 11.6 oz)   05/27/23 0600 (!) 167 kg (369 lb 0.8 oz)   05/26/23 0529 (!) 167 kg (369 lb 3.2 oz)   05/25/23 0600 (!) 168 kg (370 lb 3.2 oz)   05/24/23 0600 (!) 166 kg (366 lb 9.6 oz)   05/22/23 0529 (!) 169 kg (373 lb 7.4 oz)   05/21/23 0600 (!) 169 kg (371 lb 12.8 oz)   05/20/23 0600 (!) 171 kg (376 lb 5.2 oz)   05/19/23 0300 (!) 168 kg (370 lb 14.4 oz)   05/18/23 1912 (!) 168 kg (371 lb 7.6 oz)   05/18/23 0600 (!) 169 kg (371 lb 11.1 oz)   05/16/23 0700 (!) 171 kg (377 lb 4.8 oz)   05/14/23 0500 (!) 171 kg (377 lb 3.3 oz)   05/12/23 1143 (!) 170 kg (375 lb)   05/06/23 0258 (!) 170 kg (375 lb 8 oz)   04/19/23 0909 (!) 163 kg (359 lb)   04/03/23 1906 (!) 168 kg (370 lb)   03/27/23 0938 (!) 170 kg (373 lb 10.9 oz)   03/17/23 1153 (!) 168 kg (370 lb)   01/27/23  1501 (!) 168 kg (370 lb)   12/22/22 1501 (!) 171 kg (376 lb)   11/08/22 1035 (!) 161 kg (355 lb)   10/01/22 1141 (!) 164 kg (360 lb 10.8 oz)   05/18/22 1311 (!) 155 kg (341 lb)   03/24/22 1432 (!) 155 kg (341 lb)   03/02/22 1412 (!) 155 kg (341 lb)   01/12/22 1317 (!) 155 kg (341 lb)          Wt Change Observation      Estimated/Assessed Needs  Estimated Needs based on: Ideal Body Weight       Energy Requirements 25-30 kcal/kg   EST Needs (kcal/day) 8104-4411 kcal        Protein Requirements 1.2-1.5 g.kg   EST Daily Needs (g/day)  g       Fluid Requirements 25-30 ml/kg    Estimated Needs (mL/day) 2267-4737 ml      Labs/Medications         Pertinent Labs Reviewed.   Results from last 7 days   Lab Units 12/27/24  0601 12/26/24  0555 12/25/24  0425   SODIUM mmol/L 134* 137 136   POTASSIUM mmol/L 3.2* 3.6 4.0   CHLORIDE mmol/L 96* 102 102   CO2 mmol/L 29.0 27.9 28.8   BUN mg/dL 8 9 10   CREATININE mg/dL 0.49* 0.46* 0.55*   CALCIUM mg/dL 8.1* 8.1* 8.3*   GLUCOSE mg/dL 157* 131* 141*     Results from last 7 days   Lab Units 12/27/24  0601 12/26/24  0555 12/25/24  0425   MAGNESIUM mg/dL 1.7 1.7 1.7   PHOSPHORUS mg/dL 2.6 2.9 3.3   HEMOGLOBIN g/dL 9.3* 8.7* 8.7*   HEMATOCRIT % 33.4* 30.4* 30.4*     COVID19   Date Value Ref Range Status   10/14/2024 Detected (C) Not Detected - Ref. Range Final     Lab Results   Component Value Date    HGBA1C 4.40 (L) 08/28/2024         Pertinent Medications Reviewed.     Malnutrition Severity Assessment              Nutrition Diagnosis         Nutrition Dx Problem 1 Overweight/Obesity related to  excessive energy intake  as evidenced by  BMI >50     Nutrition Intervention           Current Nutrition Orders & Evaluation of Intake       Current PO Diet Diet: Cardiac, Diabetic, Fluid Restriction (240 mL/tray); Low Sodium (2g); Consistent Carbohydrate; 2000 mL/day; Fluid Consistency: Thin (IDDSI 0)   Supplement Orders Placed This Encounter      DIET MESSAGE Double Protein            Nutrition Intervention/Prescription        Continue Cardiac, Diabetic diet as tolerated  Double protein at meals (prevent further skin breakdown)        Medical Nutrition Therapy/Nutrition Education          Learner     Readiness Patient  Acceptance     Method     Response Explanation  Verbalizes understanding     Monitor/Evaluation        Monitor Per protocol, I&O, PO intake, GI status     Nutrition Discharge Plan         To be determined     Electronically signed by:  Terry Maloney RD  12/27/24 09:48 EST

## 2024-12-27 NOTE — PROGRESS NOTES
[facility]   Hospitalist Progress Note  Date: 2024  Patient Name: Jose Shaikh  : 1960  MRN: [id number]  Date of admission: 2024      Subjective   Subjective     Chief Complaint: Lower extremity swelling    Summary: 64-year-old man who presents to the emergency room for worsening lower extremity swelling, shortness of breath, and suicidal ideation.  Patient also states that he does not have anybody at home to take care of him.  He has chronic leg wounds.  He has a history of diabetes atrial fibrillation ulcers.  The patient states that he is going to take all his muscle relaxants to kill himself because he cannot go on living like this.  He was admitted for further care, psychiatry was consulted.  Patient was no longer suicidal after he was admitted to the hospital and bedside sitter was discontinued.  He was started on Wellbutrin per psychiatry recommendations.  He was diuresed aggressively, Peralta catheter was placed by urology.  Wound care following.  Can have a voiding trial prior to discharge.  Having good urine output with over 25 L of fluid removed since admission.  Plan is to go home with home health after discharge when medically ready.    Interval Followup:     Patient states he has not been out of the bed.  Patient states he is nonambulatory at baseline  Patient continues to have indwelling Peralta catheter due to urinary output due to Lasix  Continues to be negative 27L  Patient states that he lives alone and is unable to care for himself.      Objective   Objective     Vitals:   Temp:  [97.3 °F (36.3 °C)-98.2 °F (36.8 °C)] 98.2 °F (36.8 °C)  Heart Rate:  [66-80] 71  Resp:  [18] 18  BP: (108-130)/(47-58) 117/47  Physical Exam   General: Obese, no acute distress, resting in bed   Respiratory: no wheezing   Cardiovascular:RRR, no murmur  Abdomen: obese NT  MSK: Bilateral lower extremities in Ace bandages,  Neurologic: Alert and oriented  : peralta in place with yellow urine noted      Result Review    I have personally reviewed the results below:  [x]  Laboratory personally reviewed BMP, CBC, magnesium, phosphorus  []  Microbiology  []  Radiology  []  EKG/Telemetry   []  Cardiology/Vascular   []  Pathology  []  Old records  []  Other:  CBC          12/25/2024    04:25 12/26/2024    05:55 12/27/2024    06:01   CBC   WBC 5.71  5.28  5.63    RBC 4.36  4.34  4.69    Hemoglobin 8.7  8.7  9.3    Hematocrit 30.4  30.4  33.4    MCV 69.7  70.0  71.2    MCH 20.0  20.0  19.8    MCHC 28.6  28.6  27.8    RDW 21.6  22.5  22.5    Platelets 148  139  154      CMP          12/25/2024    04:25 12/26/2024    05:55 12/27/2024    06:01   CMP   Glucose 141  131  157    BUN 10  9  8    Creatinine 0.55  0.46  0.49    EGFR 109.3  115.4  113.2    Sodium 136  137  134    Potassium 4.0  3.6  3.2    Chloride 102  102  96    Calcium 8.3  8.1  8.1    BUN/Creatinine Ratio 18.2  19.6  16.3    Anion Gap 5.2  7.1  9.0        Assessment & Plan   Assessment / Plan   Acute on chronic diastolic congestive heart failure with acute exacerbation  Suicidal ideation  Chronic bilateral foot wounds  Paroxysmal atrial fibrillation  Type 2 diabetes mellitus  Morbid obesity with BMI of 52  Debility with wheelchair dependence  Osteoarthritis of the hip  Chronic venous stasis  Deconditioning  Hypomagnesemia  Buried penis  Hypophosphatemia  Hemorrhoids  Anasarca    PLAN   Continue to monitor in the hospital for workup and management of the above  Continue Wellbutrin per psychiatry recommendations  Metabolic alkalosis resolved, continue Lasix 40 mg IV twice daily,  Continue King catheter for now, whenever we transition off IV diuretics, will attempt voiding trial.  King was placed due to excoriations and wounds and in setting of aggressive diuresis needing strict I's and O's  Continue Tylenol, baclofen, or Ultram as needed for pain  Replace oral potassium and IV magnesium   Wound care following, appreciate assistance. Continue with lower  extremity compression  Continue oral iron supplement  Blood sugars acceptable, continue Lantus 10 units daily, continue with sliding scale insulin  Continue appropriate home medications  PT/OT/social work consulted. Patient states unable to ambulate or care for himself at home   Trend renal function and electrolytes with a.m. BMP, magnesium   Trend Hgb and WBC with a.m. CBC     Discussed plan with RN    VTE Prophylaxis:  Pharmacologic & mechanical VTE prophylaxis orders are present.        CODE STATUS:   Level Of Support Discussed With: Patient  Code Status (Patient has no pulse and is not breathing): CPR (Attempt to Resuscitate)  Medical Interventions (Patient has pulse or is breathing): Full Support

## 2024-12-27 NOTE — PLAN OF CARE
Goal Outcome Evaluation:  Plan of Care Reviewed With: patient        Progress: no change  Outcome Evaluation: Patient complained of pain and medicated per orders, vitals stable, refusing turns and wound care, MD aware, needing Mg and K+ replacement.

## 2024-12-28 LAB
ANION GAP SERPL CALCULATED.3IONS-SCNC: 8.5 MMOL/L (ref 5–15)
BASOPHILS # BLD AUTO: 0.06 10*3/MM3 (ref 0–0.2)
BASOPHILS NFR BLD AUTO: 1 % (ref 0–1.5)
BUN SERPL-MCNC: 11 MG/DL (ref 8–23)
BUN/CREAT SERPL: 22.9 (ref 7–25)
CALCIUM SPEC-SCNC: 8 MG/DL (ref 8.6–10.5)
CHLORIDE SERPL-SCNC: 99 MMOL/L (ref 98–107)
CO2 SERPL-SCNC: 28.5 MMOL/L (ref 22–29)
CREAT SERPL-MCNC: 0.48 MG/DL (ref 0.76–1.27)
DEPRECATED RDW RBC AUTO: 55.5 FL (ref 37–54)
EGFRCR SERPLBLD CKD-EPI 2021: 113.9 ML/MIN/1.73
EOSINOPHIL # BLD AUTO: 0.12 10*3/MM3 (ref 0–0.4)
EOSINOPHIL NFR BLD AUTO: 2 % (ref 0.3–6.2)
ERYTHROCYTE [DISTWIDTH] IN BLOOD BY AUTOMATED COUNT: 22.5 % (ref 12.3–15.4)
GLUCOSE BLDC GLUCOMTR-MCNC: 166 MG/DL (ref 70–99)
GLUCOSE BLDC GLUCOMTR-MCNC: 166 MG/DL (ref 70–99)
GLUCOSE BLDC GLUCOMTR-MCNC: 173 MG/DL (ref 70–99)
GLUCOSE BLDC GLUCOMTR-MCNC: 241 MG/DL (ref 70–99)
GLUCOSE SERPL-MCNC: 144 MG/DL (ref 65–99)
HCT VFR BLD AUTO: 32.3 % (ref 37.5–51)
HGB BLD-MCNC: 9.2 G/DL (ref 13–17.7)
IMM GRANULOCYTES # BLD AUTO: 0.03 10*3/MM3 (ref 0–0.05)
IMM GRANULOCYTES NFR BLD AUTO: 0.5 % (ref 0–0.5)
LYMPHOCYTES # BLD AUTO: 0.96 10*3/MM3 (ref 0.7–3.1)
LYMPHOCYTES NFR BLD AUTO: 15.9 % (ref 19.6–45.3)
MAGNESIUM SERPL-MCNC: 2.1 MG/DL (ref 1.6–2.4)
MCH RBC QN AUTO: 20.2 PG (ref 26.6–33)
MCHC RBC AUTO-ENTMCNC: 28.5 G/DL (ref 31.5–35.7)
MCV RBC AUTO: 70.8 FL (ref 79–97)
MONOCYTES # BLD AUTO: 0.75 10*3/MM3 (ref 0.1–0.9)
MONOCYTES NFR BLD AUTO: 12.4 % (ref 5–12)
NEUTROPHILS NFR BLD AUTO: 4.12 10*3/MM3 (ref 1.7–7)
NEUTROPHILS NFR BLD AUTO: 68.2 % (ref 42.7–76)
NRBC BLD AUTO-RTO: 0 /100 WBC (ref 0–0.2)
PHOSPHATE SERPL-MCNC: 2.7 MG/DL (ref 2.5–4.5)
PLATELET # BLD AUTO: 145 10*3/MM3 (ref 140–450)
PMV BLD AUTO: ABNORMAL FL
POTASSIUM SERPL-SCNC: 3.6 MMOL/L (ref 3.5–5.2)
RBC # BLD AUTO: 4.56 10*6/MM3 (ref 4.14–5.8)
SODIUM SERPL-SCNC: 136 MMOL/L (ref 136–145)
WBC NRBC COR # BLD AUTO: 6.04 10*3/MM3 (ref 3.4–10.8)

## 2024-12-28 PROCEDURE — 63710000001 INSULIN LISPRO (HUMAN) PER 5 UNITS: Performed by: HOSPITALIST

## 2024-12-28 PROCEDURE — 80048 BASIC METABOLIC PNL TOTAL CA: CPT | Performed by: INTERNAL MEDICINE

## 2024-12-28 PROCEDURE — 63710000001 INSULIN GLARGINE PER 5 UNITS: Performed by: INTERNAL MEDICINE

## 2024-12-28 PROCEDURE — 85025 COMPLETE CBC W/AUTO DIFF WBC: CPT | Performed by: INTERNAL MEDICINE

## 2024-12-28 PROCEDURE — 84100 ASSAY OF PHOSPHORUS: CPT | Performed by: INTERNAL MEDICINE

## 2024-12-28 PROCEDURE — 25010000002 FUROSEMIDE PER 20 MG: Performed by: INTERNAL MEDICINE

## 2024-12-28 PROCEDURE — 83735 ASSAY OF MAGNESIUM: CPT | Performed by: INTERNAL MEDICINE

## 2024-12-28 PROCEDURE — 25010000002 ORPHENADRINE CITRATE PER 60 MG: Performed by: FAMILY MEDICINE

## 2024-12-28 PROCEDURE — 25010000002 POTASSIUM CHLORIDE 10 MEQ/100ML SOLUTION: Performed by: FAMILY MEDICINE

## 2024-12-28 PROCEDURE — 82948 REAGENT STRIP/BLOOD GLUCOSE: CPT | Performed by: HOSPITALIST

## 2024-12-28 PROCEDURE — 25010000002 KETOROLAC TROMETHAMINE PER 15 MG: Performed by: FAMILY MEDICINE

## 2024-12-28 PROCEDURE — 99232 SBSQ HOSP IP/OBS MODERATE 35: CPT | Performed by: INTERNAL MEDICINE

## 2024-12-28 PROCEDURE — 82948 REAGENT STRIP/BLOOD GLUCOSE: CPT

## 2024-12-28 RX ORDER — POTASSIUM CHLORIDE 750 MG/1
40 CAPSULE, EXTENDED RELEASE ORAL ONCE
Status: COMPLETED | OUTPATIENT
Start: 2024-12-28 | End: 2024-12-28

## 2024-12-28 RX ADMIN — KETOROLAC TROMETHAMINE 15 MG: 30 INJECTION, SOLUTION INTRAMUSCULAR; INTRAVENOUS at 00:32

## 2024-12-28 RX ADMIN — POTASSIUM CHLORIDE 10 MEQ: 7.45 INJECTION INTRAVENOUS at 00:35

## 2024-12-28 RX ADMIN — APIXABAN 5 MG: 5 TABLET, FILM COATED ORAL at 08:46

## 2024-12-28 RX ADMIN — OXYCODONE 5 MG: 5 TABLET ORAL at 12:39

## 2024-12-28 RX ADMIN — OXYCODONE 5 MG: 5 TABLET ORAL at 04:25

## 2024-12-28 RX ADMIN — Medication 10 ML: at 08:47

## 2024-12-28 RX ADMIN — SERTRALINE 50 MG: 50 TABLET, FILM COATED ORAL at 20:31

## 2024-12-28 RX ADMIN — POTASSIUM CHLORIDE 40 MEQ: 750 CAPSULE, EXTENDED RELEASE ORAL at 17:35

## 2024-12-28 RX ADMIN — ACETAMINOPHEN 650 MG: 325 TABLET ORAL at 05:56

## 2024-12-28 RX ADMIN — INSULIN GLARGINE 10 UNITS: 100 INJECTION, SOLUTION SUBCUTANEOUS at 08:45

## 2024-12-28 RX ADMIN — BACLOFEN 20 MG: 10 TABLET ORAL at 20:31

## 2024-12-28 RX ADMIN — INSULIN LISPRO 2 UNITS: 100 INJECTION, SOLUTION INTRAVENOUS; SUBCUTANEOUS at 12:38

## 2024-12-28 RX ADMIN — INSULIN LISPRO 6 UNITS: 100 INJECTION, SOLUTION INTRAVENOUS; SUBCUTANEOUS at 20:32

## 2024-12-28 RX ADMIN — ORPHENADRINE CITRATE 60 MG: 60 INJECTION INTRAMUSCULAR; INTRAVENOUS at 00:32

## 2024-12-28 RX ADMIN — ATORVASTATIN CALCIUM 10 MG: 10 TABLET, FILM COATED ORAL at 20:31

## 2024-12-28 RX ADMIN — INSULIN LISPRO 2 UNITS: 100 INJECTION, SOLUTION INTRAVENOUS; SUBCUTANEOUS at 08:45

## 2024-12-28 RX ADMIN — BUPROPION HYDROCHLORIDE 150 MG: 150 TABLET, EXTENDED RELEASE ORAL at 08:47

## 2024-12-28 RX ADMIN — DIGOXIN 125 MCG: 250 TABLET ORAL at 12:38

## 2024-12-28 RX ADMIN — WHITE PETROLATUM 1 APPLICATION: 1.75 OINTMENT TOPICAL at 12:42

## 2024-12-28 RX ADMIN — BACLOFEN 20 MG: 10 TABLET ORAL at 04:25

## 2024-12-28 RX ADMIN — OXYCODONE 5 MG: 5 TABLET ORAL at 20:32

## 2024-12-28 RX ADMIN — INSULIN LISPRO 2 UNITS: 100 INJECTION, SOLUTION INTRAVENOUS; SUBCUTANEOUS at 17:35

## 2024-12-28 RX ADMIN — Medication 10 ML: at 20:32

## 2024-12-28 RX ADMIN — APIXABAN 5 MG: 5 TABLET, FILM COATED ORAL at 20:31

## 2024-12-28 NOTE — PLAN OF CARE
Goal Outcome Evaluation:           Progress: no change  Outcome Evaluation: pt complains of pain in hip  and leg with movement, Kt replacement, Iv lasix dcd, fluid restriction in place

## 2024-12-28 NOTE — PLAN OF CARE
Goal Outcome Evaluation:               Pt is alert and oriented. Pt voiced pain in legs. Stated cramping and baclofen not as effective. Did notify on-call Dr. Reg RICE Hospitalist and new orders were added see EMAR. Pt was given baclofen and oxy-ir tonight as well. Pt blood siugar was 191 and 2 units of humalog was given as per order required. Pt refusing to turn and reposition at times. Pt resting in bed watching television. Noted edema generally. Will cotinue to monitor.

## 2024-12-28 NOTE — PROGRESS NOTES
[facility]   Hospitalist Progress Note  Date: 2024  Patient Name: Jose Shaikh  : 1960  MRN: [id number]  Date of admission: 2024      Subjective   Subjective     Chief Complaint: Lower extremity swelling    Summary: 64-year-old man who presents to the emergency room for worsening lower extremity swelling, shortness of breath, and suicidal ideation.  Patient also states that he does not have anybody at home to take care of him.  He has chronic leg wounds.  He has a history of diabetes atrial fibrillation ulcers.  The patient states that he is going to take all his muscle relaxants to kill himself because he cannot go on living like this.  He was admitted for further care, psychiatry was consulted.  Patient was no longer suicidal after he was admitted to the hospital and bedside sitter was discontinued.  He was started on Wellbutrin per psychiatry recommendations.  He was diuresed aggressively, King catheter was placed by urology.  Wound care following.  Can have a voiding trial prior to discharge.  Having good urine output with over 25 L of fluid removed since admission.  Plan is to go home with home health after discharge when medically ready.    Interval Followup:     Patient states he has not been out of the bed.  Patient states he is nonambulatory at baseline  Patient continues to have indwelling King catheter due to urinary output due to Lasix  Continues to be negative  was 27L yesterday now 20L today?   Patient states that he lives alone and is unable to care for himself.    Would like to hold lasix for the weekend due to cramping     Objective   Objective     Vitals:   Temp:  [97.3 °F (36.3 °C)-98.2 °F (36.8 °C)] 97.9 °F (36.6 °C)  Heart Rate:  [71-80] 72  Resp:  [18] 18  BP: (100-134)/(40-70) 120/49  Physical Exam   General: Obese, no acute distress, resting in bed   Respiratory: no wheezing   Cardiovascular:RRR, no murmur  Abdomen: obese NT  MSK: Bilateral lower extremities in  Ace bandages,  Neurologic: Alert and oriented  : peralta in place with yellow urine noted     Result Review    I have personally reviewed the results below:  [x]  Laboratory personally reviewed BMP, CBC, magnesium, phosphorus  []  Microbiology  []  Radiology  []  EKG/Telemetry   []  Cardiology/Vascular   []  Pathology  []  Old records  []  Other:  CBC          12/26/2024    05:55 12/27/2024    06:01 12/28/2024    06:10   CBC   WBC 5.28  5.63  6.04    RBC 4.34  4.69  4.56    Hemoglobin 8.7  9.3  9.2    Hematocrit 30.4  33.4  32.3    MCV 70.0  71.2  70.8    MCH 20.0  19.8  20.2    MCHC 28.6  27.8  28.5    RDW 22.5  22.5  22.5    Platelets 139  154  145      CMP          12/26/2024    05:55 12/27/2024    06:01 12/28/2024    06:10   CMP   Glucose 131  157  144    BUN 9  8  11    Creatinine 0.46  0.49  0.48    EGFR 115.4  113.2  113.9    Sodium 137  134  136    Potassium 3.6  3.2  3.6    Chloride 102  96  99    Calcium 8.1  8.1  8.0    BUN/Creatinine Ratio 19.6  16.3  22.9    Anion Gap 7.1  9.0  8.5        Assessment & Plan   Assessment / Plan   Acute on chronic diastolic congestive heart failure with acute exacerbation  Suicidal ideation  Chronic bilateral foot wounds  Paroxysmal atrial fibrillation  Type 2 diabetes mellitus  Morbid obesity with BMI of 52  Debility with wheelchair dependence  Osteoarthritis of the hip  Chronic venous stasis  Deconditioning  Hypomagnesemia  Buried penis  Hypophosphatemia  Hemorrhoids  Anasarca    PLAN   Continue to monitor in the hospital for workup and management of the above  Continue Wellbutrin per psychiatry recommendations  Patient did receive Lasix this morning.  Will hold Lasix for now given patient does have significant cramping.  Can resume Lasix on Monday  Continue Peralta catheter for now, whenever we transition off IV diuretics, will attempt voiding trial.  Peralta was placed due to excoriations and wounds and in setting of aggressive diuresis needing strict I's and O's  Continue  Tylenol, baclofen, or Ultram as needed for pain  Replace oral potassium   Wound care following, appreciate assistance. Continue with lower extremity compression  Continue oral iron supplement  Blood sugars acceptable, continue Lantus 10 units daily, continue with sliding scale insulin  Continue appropriate home medications  PT/OT/social work consulted. Patient states unable to ambulate or care for himself at home   Trend renal function and electrolytes with a.m. BMP, magnesium   Trend Hgb and WBC with a.m. CBC     Discussed plan with RN    VTE Prophylaxis:  Pharmacologic & mechanical VTE prophylaxis orders are present.        CODE STATUS:   Level Of Support Discussed With: Patient  Code Status (Patient has no pulse and is not breathing): CPR (Attempt to Resuscitate)  Medical Interventions (Patient has pulse or is breathing): Full Support

## 2024-12-29 LAB
ANION GAP SERPL CALCULATED.3IONS-SCNC: 8.1 MMOL/L (ref 5–15)
ANISOCYTOSIS BLD QL: NORMAL
BASOPHILS # BLD AUTO: 0.05 10*3/MM3 (ref 0–0.2)
BASOPHILS NFR BLD AUTO: 0.9 % (ref 0–1.5)
BUN SERPL-MCNC: 11 MG/DL (ref 8–23)
BUN/CREAT SERPL: 24.4 (ref 7–25)
CALCIUM SPEC-SCNC: 7.9 MG/DL (ref 8.6–10.5)
CHLORIDE SERPL-SCNC: 98 MMOL/L (ref 98–107)
CO2 SERPL-SCNC: 27.9 MMOL/L (ref 22–29)
CREAT SERPL-MCNC: 0.45 MG/DL (ref 0.76–1.27)
DEPRECATED RDW RBC AUTO: 55.6 FL (ref 37–54)
EGFRCR SERPLBLD CKD-EPI 2021: 116.1 ML/MIN/1.73
EOSINOPHIL # BLD AUTO: 0.12 10*3/MM3 (ref 0–0.4)
EOSINOPHIL NFR BLD AUTO: 2.1 % (ref 0.3–6.2)
ERYTHROCYTE [DISTWIDTH] IN BLOOD BY AUTOMATED COUNT: 22.5 % (ref 12.3–15.4)
GLUCOSE BLDC GLUCOMTR-MCNC: 171 MG/DL (ref 70–99)
GLUCOSE BLDC GLUCOMTR-MCNC: 182 MG/DL (ref 70–99)
GLUCOSE BLDC GLUCOMTR-MCNC: 222 MG/DL (ref 70–99)
GLUCOSE SERPL-MCNC: 203 MG/DL (ref 65–99)
HCT VFR BLD AUTO: 31.7 % (ref 37.5–51)
HGB BLD-MCNC: 9 G/DL (ref 13–17.7)
HYPOCHROMIA BLD QL: NORMAL
IMM GRANULOCYTES # BLD AUTO: 0.02 10*3/MM3 (ref 0–0.05)
IMM GRANULOCYTES NFR BLD AUTO: 0.4 % (ref 0–0.5)
LYMPHOCYTES # BLD AUTO: 0.85 10*3/MM3 (ref 0.7–3.1)
LYMPHOCYTES NFR BLD AUTO: 15.1 % (ref 19.6–45.3)
MAGNESIUM SERPL-MCNC: 1.7 MG/DL (ref 1.6–2.4)
MCH RBC QN AUTO: 20.1 PG (ref 26.6–33)
MCHC RBC AUTO-ENTMCNC: 28.4 G/DL (ref 31.5–35.7)
MCV RBC AUTO: 70.8 FL (ref 79–97)
MICROCYTES BLD QL: NORMAL
MONOCYTES # BLD AUTO: 0.7 10*3/MM3 (ref 0.1–0.9)
MONOCYTES NFR BLD AUTO: 12.4 % (ref 5–12)
NEUTROPHILS NFR BLD AUTO: 3.89 10*3/MM3 (ref 1.7–7)
NEUTROPHILS NFR BLD AUTO: 69.1 % (ref 42.7–76)
NRBC BLD AUTO-RTO: 0 /100 WBC (ref 0–0.2)
OVALOCYTES BLD QL SMEAR: NORMAL
PHOSPHATE SERPL-MCNC: 2.6 MG/DL (ref 2.5–4.5)
PLATELET # BLD AUTO: 135 10*3/MM3 (ref 140–450)
PMV BLD AUTO: ABNORMAL FL
POIKILOCYTOSIS BLD QL SMEAR: NORMAL
POTASSIUM SERPL-SCNC: 4 MMOL/L (ref 3.5–5.2)
RBC # BLD AUTO: 4.48 10*6/MM3 (ref 4.14–5.8)
SMALL PLATELETS BLD QL SMEAR: NORMAL
SODIUM SERPL-SCNC: 134 MMOL/L (ref 136–145)
WBC MORPH BLD: NORMAL
WBC NRBC COR # BLD AUTO: 5.63 10*3/MM3 (ref 3.4–10.8)

## 2024-12-29 PROCEDURE — 85007 BL SMEAR W/DIFF WBC COUNT: CPT | Performed by: INTERNAL MEDICINE

## 2024-12-29 PROCEDURE — 82948 REAGENT STRIP/BLOOD GLUCOSE: CPT

## 2024-12-29 PROCEDURE — 63710000001 INSULIN GLARGINE PER 5 UNITS: Performed by: INTERNAL MEDICINE

## 2024-12-29 PROCEDURE — 80048 BASIC METABOLIC PNL TOTAL CA: CPT | Performed by: INTERNAL MEDICINE

## 2024-12-29 PROCEDURE — 99232 SBSQ HOSP IP/OBS MODERATE 35: CPT | Performed by: INTERNAL MEDICINE

## 2024-12-29 PROCEDURE — 85025 COMPLETE CBC W/AUTO DIFF WBC: CPT | Performed by: INTERNAL MEDICINE

## 2024-12-29 PROCEDURE — 84100 ASSAY OF PHOSPHORUS: CPT | Performed by: INTERNAL MEDICINE

## 2024-12-29 PROCEDURE — 63710000001 INSULIN LISPRO (HUMAN) PER 5 UNITS: Performed by: HOSPITALIST

## 2024-12-29 PROCEDURE — 83735 ASSAY OF MAGNESIUM: CPT | Performed by: INTERNAL MEDICINE

## 2024-12-29 RX ADMIN — INSULIN LISPRO 2 UNITS: 100 INJECTION, SOLUTION INTRAVENOUS; SUBCUTANEOUS at 21:10

## 2024-12-29 RX ADMIN — DIGOXIN 125 MCG: 250 TABLET ORAL at 12:17

## 2024-12-29 RX ADMIN — INSULIN LISPRO 2 UNITS: 100 INJECTION, SOLUTION INTRAVENOUS; SUBCUTANEOUS at 12:17

## 2024-12-29 RX ADMIN — INSULIN LISPRO 4 UNITS: 100 INJECTION, SOLUTION INTRAVENOUS; SUBCUTANEOUS at 18:45

## 2024-12-29 RX ADMIN — APIXABAN 5 MG: 5 TABLET, FILM COATED ORAL at 08:52

## 2024-12-29 RX ADMIN — TRAMADOL HYDROCHLORIDE 100 MG: 50 TABLET, COATED ORAL at 17:05

## 2024-12-29 RX ADMIN — SERTRALINE 50 MG: 50 TABLET, FILM COATED ORAL at 21:05

## 2024-12-29 RX ADMIN — WHITE PETROLATUM 1 APPLICATION: 1.75 OINTMENT TOPICAL at 12:18

## 2024-12-29 RX ADMIN — OXYCODONE 5 MG: 5 TABLET ORAL at 03:48

## 2024-12-29 RX ADMIN — APIXABAN 5 MG: 5 TABLET, FILM COATED ORAL at 21:06

## 2024-12-29 RX ADMIN — Medication 10 ML: at 08:52

## 2024-12-29 RX ADMIN — ATORVASTATIN CALCIUM 10 MG: 10 TABLET, FILM COATED ORAL at 21:05

## 2024-12-29 RX ADMIN — OXYCODONE 5 MG: 5 TABLET ORAL at 21:06

## 2024-12-29 RX ADMIN — Medication 5 MG: at 21:05

## 2024-12-29 RX ADMIN — OXYCODONE 5 MG: 5 TABLET ORAL at 12:02

## 2024-12-29 RX ADMIN — INSULIN GLARGINE 10 UNITS: 100 INJECTION, SOLUTION SUBCUTANEOUS at 08:50

## 2024-12-29 RX ADMIN — BACLOFEN 20 MG: 10 TABLET ORAL at 03:47

## 2024-12-29 RX ADMIN — Medication 10 ML: at 21:06

## 2024-12-29 RX ADMIN — BUPROPION HYDROCHLORIDE 150 MG: 150 TABLET, EXTENDED RELEASE ORAL at 08:51

## 2024-12-29 RX ADMIN — INSULIN LISPRO 4 UNITS: 100 INJECTION, SOLUTION INTRAVENOUS; SUBCUTANEOUS at 08:50

## 2024-12-29 NOTE — PROGRESS NOTES
[facility]   Hospitalist Progress Note  Date: 2024  Patient Name: Jose Shaikh  : 1960  MRN: [id number]  Date of admission: 2024      Subjective   Subjective     Chief Complaint: Lower extremity swelling    Summary: 64-year-old man who presents to the emergency room for worsening lower extremity swelling, shortness of breath, and suicidal ideation.  Patient also states that he does not have anybody at home to take care of him.  He has chronic leg wounds.  He has a history of diabetes atrial fibrillation ulcers.  The patient states that he is going to take all his muscle relaxants to kill himself because he cannot go on living like this.  He was admitted for further care, psychiatry was consulted.  Patient was no longer suicidal after he was admitted to the hospital and bedside sitter was discontinued.  He was started on Wellbutrin per psychiatry recommendations.  He was diuresed aggressively, King catheter was placed by urology.  Wound care following.  Can have a voiding trial prior to discharge.  Having good urine output with over 25 L of fluid removed since admission.  Plan is to go home with home health after discharge when medically ready.    Interval Followup:     Patient states he has not been out of the bed.  Patient states he is nonambulatory at baseline  Patient continues to have indwelling King catheter   Continues to have an negative balance however went from 27 L down to 20 L and now -13 despite being on a fluid restriction?  Continues to be negative  was 27L yesterday now 20L today?   Patient states that he lives alone and is unable to care for himself.    Would like to hold lasix for the weekend due to cramping     Objective   Objective     Vitals:   Temp:  [97.3 °F (36.3 °C)-98.6 °F (37 °C)] 98.2 °F (36.8 °C)  Heart Rate:  [77-83] 80  Resp:  [18] 18  BP: (110-118)/(48-55) 115/52  Physical Exam   General: Obese, no acute distress, resting in bed   Respiratory: no wheezing    Cardiovascular:RRR, no murmur  Abdomen: obese NT  MSK: Bilateral lower extremities in Ace bandages,  Neurologic: Alert and oriented  : peralta in place with yellow urine noted     Result Review    I have personally reviewed the results below:  [x]  Laboratory personally reviewed BMP, CBC, magnesium, phosphorus  []  Microbiology  []  Radiology  []  EKG/Telemetry   []  Cardiology/Vascular   []  Pathology  []  Old records  []  Other:  CBC          12/27/2024    06:01 12/28/2024    06:10 12/29/2024    06:53   CBC   WBC 5.63  6.04  5.63    RBC 4.69  4.56  4.48    Hemoglobin 9.3  9.2  9.0    Hematocrit 33.4  32.3  31.7    MCV 71.2  70.8  70.8    MCH 19.8  20.2  20.1    MCHC 27.8  28.5  28.4    RDW 22.5  22.5  22.5    Platelets 154  145  135      CMP          12/27/2024    06:01 12/28/2024    06:10 12/29/2024    06:53   CMP   Glucose 157  144  203    BUN 8  11  11    Creatinine 0.49  0.48  0.45    EGFR 113.2  113.9  116.1    Sodium 134  136  134    Potassium 3.2  3.6  4.0    Chloride 96  99  98    Calcium 8.1  8.0  7.9    BUN/Creatinine Ratio 16.3  22.9  24.4    Anion Gap 9.0  8.5  8.1        Assessment & Plan   Assessment / Plan   Acute on chronic diastolic congestive heart failure with acute exacerbation  Suicidal ideation  Chronic bilateral foot wounds  Paroxysmal atrial fibrillation  Type 2 diabetes mellitus  Morbid obesity with BMI of 52  Debility with wheelchair dependence  Osteoarthritis of the hip  Chronic venous stasis  Deconditioning  Hypomagnesemia  Buried penis  Hypophosphatemia  Hemorrhoids  Anasarca    PLAN   Continue to monitor in the hospital for workup and management of the above  Continue Wellbutrin per psychiatry recommendations  Lasix currently on hold due to cramping  Continue Peralta catheter for now, which was placed by urology due to urinary retention, penile edema  Continue Tylenol, baclofen, or Ultram as needed for pain  Wound care following, appreciate assistance. Continue with lower extremity  compression  Continue oral iron supplement  Blood sugars acceptable, continue Lantus 10 units daily, continue with sliding scale insulin  Continue appropriate home medications  PT/OT/social work consulted. Patient states unable to ambulate or care for himself at home   Trend renal function and electrolytes with a.m. BMP, magnesium   Trend Hgb and WBC with a.m. CBC     Discussed plan with RN    VTE Prophylaxis:  Pharmacologic & mechanical VTE prophylaxis orders are present.        CODE STATUS:   Level Of Support Discussed With: Patient  Code Status (Patient has no pulse and is not breathing): CPR (Attempt to Resuscitate)  Medical Interventions (Patient has pulse or is breathing): Full Support

## 2024-12-29 NOTE — PLAN OF CARE
Goal Outcome Evaluation:                 Pt is alert and oriented. Pleasant affect. Did c/o pain x2 and requesting baclofen and oxyir. Pt was medicated as requested. Pt c/o leg cramps. Pt has areas on right lower leg and left foot, dressings were completed. Pt assist of 2 to turn and reposition. Pt is able to voice needs with clear speech. Pt did have a large bowel movement incontinent. Pt has a peralta catheter that was placed by urology. Concentrated urine noted. Pt skin is generally pale. Noted general edema. Specialty mattress . Pt refusing to turn and reposition and states his dislike of specialty mattress that helps position. Pt does not like feet and legs elevated.

## 2024-12-30 LAB
ANION GAP SERPL CALCULATED.3IONS-SCNC: 9.8 MMOL/L (ref 5–15)
BASOPHILS # BLD AUTO: 0.05 10*3/MM3 (ref 0–0.2)
BASOPHILS NFR BLD AUTO: 0.8 % (ref 0–1.5)
BUN SERPL-MCNC: 10 MG/DL (ref 8–23)
BUN/CREAT SERPL: 29.4 (ref 7–25)
CALCIUM SPEC-SCNC: 7.9 MG/DL (ref 8.6–10.5)
CHLORIDE SERPL-SCNC: 100 MMOL/L (ref 98–107)
CO2 SERPL-SCNC: 26.2 MMOL/L (ref 22–29)
CREAT SERPL-MCNC: 0.34 MG/DL (ref 0.76–1.27)
DEPRECATED RDW RBC AUTO: 55.8 FL (ref 37–54)
EGFRCR SERPLBLD CKD-EPI 2021: 126.4 ML/MIN/1.73
EOSINOPHIL # BLD AUTO: 0.1 10*3/MM3 (ref 0–0.4)
EOSINOPHIL NFR BLD AUTO: 1.7 % (ref 0.3–6.2)
ERYTHROCYTE [DISTWIDTH] IN BLOOD BY AUTOMATED COUNT: 22.5 % (ref 12.3–15.4)
GLUCOSE BLDC GLUCOMTR-MCNC: 130 MG/DL (ref 70–99)
GLUCOSE BLDC GLUCOMTR-MCNC: 133 MG/DL (ref 70–99)
GLUCOSE BLDC GLUCOMTR-MCNC: 194 MG/DL (ref 70–99)
GLUCOSE BLDC GLUCOMTR-MCNC: 205 MG/DL (ref 70–99)
GLUCOSE SERPL-MCNC: 147 MG/DL (ref 65–99)
HCT VFR BLD AUTO: 32.5 % (ref 37.5–51)
HGB BLD-MCNC: 9.2 G/DL (ref 13–17.7)
IMM GRANULOCYTES # BLD AUTO: 0.02 10*3/MM3 (ref 0–0.05)
IMM GRANULOCYTES NFR BLD AUTO: 0.3 % (ref 0–0.5)
LYMPHOCYTES # BLD AUTO: 0.9 10*3/MM3 (ref 0.7–3.1)
LYMPHOCYTES NFR BLD AUTO: 14.9 % (ref 19.6–45.3)
MAGNESIUM SERPL-MCNC: 1.6 MG/DL (ref 1.6–2.4)
MCH RBC QN AUTO: 20 PG (ref 26.6–33)
MCHC RBC AUTO-ENTMCNC: 28.3 G/DL (ref 31.5–35.7)
MCV RBC AUTO: 70.8 FL (ref 79–97)
MONOCYTES # BLD AUTO: 0.64 10*3/MM3 (ref 0.1–0.9)
MONOCYTES NFR BLD AUTO: 10.6 % (ref 5–12)
NEUTROPHILS NFR BLD AUTO: 4.32 10*3/MM3 (ref 1.7–7)
NEUTROPHILS NFR BLD AUTO: 71.7 % (ref 42.7–76)
NRBC BLD AUTO-RTO: 0 /100 WBC (ref 0–0.2)
PHOSPHATE SERPL-MCNC: 2.8 MG/DL (ref 2.5–4.5)
PLATELET # BLD AUTO: 143 10*3/MM3 (ref 140–450)
POTASSIUM SERPL-SCNC: 3.7 MMOL/L (ref 3.5–5.2)
RBC # BLD AUTO: 4.59 10*6/MM3 (ref 4.14–5.8)
SODIUM SERPL-SCNC: 136 MMOL/L (ref 136–145)
WBC NRBC COR # BLD AUTO: 6.03 10*3/MM3 (ref 3.4–10.8)

## 2024-12-30 PROCEDURE — 83735 ASSAY OF MAGNESIUM: CPT | Performed by: INTERNAL MEDICINE

## 2024-12-30 PROCEDURE — 82948 REAGENT STRIP/BLOOD GLUCOSE: CPT

## 2024-12-30 PROCEDURE — 99232 SBSQ HOSP IP/OBS MODERATE 35: CPT | Performed by: INTERNAL MEDICINE

## 2024-12-30 PROCEDURE — 63710000001 INSULIN LISPRO (HUMAN) PER 5 UNITS: Performed by: HOSPITALIST

## 2024-12-30 PROCEDURE — 25010000002 FUROSEMIDE PER 20 MG: Performed by: INTERNAL MEDICINE

## 2024-12-30 PROCEDURE — 80048 BASIC METABOLIC PNL TOTAL CA: CPT | Performed by: INTERNAL MEDICINE

## 2024-12-30 PROCEDURE — 82948 REAGENT STRIP/BLOOD GLUCOSE: CPT | Performed by: HOSPITALIST

## 2024-12-30 PROCEDURE — 63710000001 INSULIN GLARGINE PER 5 UNITS: Performed by: INTERNAL MEDICINE

## 2024-12-30 PROCEDURE — 85025 COMPLETE CBC W/AUTO DIFF WBC: CPT | Performed by: INTERNAL MEDICINE

## 2024-12-30 PROCEDURE — 84100 ASSAY OF PHOSPHORUS: CPT | Performed by: INTERNAL MEDICINE

## 2024-12-30 PROCEDURE — 25010000002 ONDANSETRON PER 1 MG: Performed by: HOSPITALIST

## 2024-12-30 RX ORDER — CYCLOBENZAPRINE HCL 5 MG
5 TABLET ORAL ONCE AS NEEDED
Status: COMPLETED | OUTPATIENT
Start: 2024-12-30 | End: 2024-12-30

## 2024-12-30 RX ORDER — FUROSEMIDE 10 MG/ML
40 INJECTION INTRAMUSCULAR; INTRAVENOUS DAILY
Status: DISCONTINUED | OUTPATIENT
Start: 2024-12-30 | End: 2025-01-01

## 2024-12-30 RX ORDER — MINERAL OIL/HYDROPHIL PETROLAT
1 OINTMENT (GRAM) TOPICAL
Status: DISCONTINUED | OUTPATIENT
Start: 2024-12-31 | End: 2025-01-16 | Stop reason: HOSPADM

## 2024-12-30 RX ADMIN — METOPROLOL SUCCINATE 12.5 MG: 25 TABLET, EXTENDED RELEASE ORAL at 09:12

## 2024-12-30 RX ADMIN — Medication 10 ML: at 09:12

## 2024-12-30 RX ADMIN — APIXABAN 5 MG: 5 TABLET, FILM COATED ORAL at 09:12

## 2024-12-30 RX ADMIN — INSULIN LISPRO 2 UNITS: 100 INJECTION, SOLUTION INTRAVENOUS; SUBCUTANEOUS at 20:26

## 2024-12-30 RX ADMIN — BUPROPION HYDROCHLORIDE 150 MG: 150 TABLET, EXTENDED RELEASE ORAL at 09:11

## 2024-12-30 RX ADMIN — BACLOFEN 20 MG: 10 TABLET ORAL at 04:19

## 2024-12-30 RX ADMIN — BACLOFEN 20 MG: 10 TABLET ORAL at 15:46

## 2024-12-30 RX ADMIN — FUROSEMIDE 40 MG: 10 INJECTION, SOLUTION INTRAMUSCULAR; INTRAVENOUS at 15:47

## 2024-12-30 RX ADMIN — TRAMADOL HYDROCHLORIDE 100 MG: 50 TABLET, COATED ORAL at 04:16

## 2024-12-30 RX ADMIN — WHITE PETROLATUM 1 APPLICATION: 1.75 OINTMENT TOPICAL at 15:50

## 2024-12-30 RX ADMIN — ONDANSETRON 4 MG: 2 INJECTION INTRAMUSCULAR; INTRAVENOUS at 09:11

## 2024-12-30 RX ADMIN — TRAMADOL HYDROCHLORIDE 100 MG: 50 TABLET, COATED ORAL at 15:46

## 2024-12-30 RX ADMIN — Medication 10 ML: at 20:26

## 2024-12-30 RX ADMIN — CYCLOBENZAPRINE HYDROCHLORIDE 5 MG: 5 TABLET, FILM COATED ORAL at 20:25

## 2024-12-30 RX ADMIN — ATORVASTATIN CALCIUM 10 MG: 10 TABLET, FILM COATED ORAL at 20:26

## 2024-12-30 RX ADMIN — CALCIUM CARBONATE 2 TABLET: 500 TABLET, CHEWABLE ORAL at 02:51

## 2024-12-30 RX ADMIN — SERTRALINE 50 MG: 50 TABLET, FILM COATED ORAL at 20:26

## 2024-12-30 RX ADMIN — DIGOXIN 125 MCG: 250 TABLET ORAL at 12:48

## 2024-12-30 RX ADMIN — APIXABAN 5 MG: 5 TABLET, FILM COATED ORAL at 20:26

## 2024-12-30 RX ADMIN — OXYCODONE 5 MG: 5 TABLET ORAL at 20:26

## 2024-12-30 RX ADMIN — INSULIN LISPRO 4 UNITS: 100 INJECTION, SOLUTION INTRAVENOUS; SUBCUTANEOUS at 17:57

## 2024-12-30 RX ADMIN — INSULIN GLARGINE 10 UNITS: 100 INJECTION, SOLUTION SUBCUTANEOUS at 09:11

## 2024-12-30 RX ADMIN — OXYCODONE 5 MG: 5 TABLET ORAL at 09:12

## 2024-12-30 NOTE — PLAN OF CARE
Goal Outcome Evaluation:  Plan of Care Reviewed With: patient        Progress: no change  Outcome Evaluation: C/O pain in hip and legs, medication given to pain per orders, adjusted in bed to help relieve pain. Appears to be sleeping on and off throughout shift.

## 2024-12-30 NOTE — SIGNIFICANT NOTE
Wound Eval / Progress Noted    KEO Dominguez     Patient Name: Jose Shaikh  : 1960  MRN: [id number]  Today's Date: 2024                 Admit Date: 2024    Visit Dx:    ICD-10-CM ICD-9-CM   1. Hypervolemia, unspecified hypervolemia type  E87.70 276.69   2. Congestive heart failure, unspecified HF chronicity, unspecified heart failure type  I50.9 428.0   3. Suicidal ideation  R45.851 V62.84         CHF exacerbation        Past Medical History:   Diagnosis Date    Absence of toe of right foot     Acute osteomyelitis of left calcaneus  2021    Anxiety and depression     Arthritis     Cancer     Chronic pain     STATES HIS PAIN IS 10/10 AAT    Claustrophobia     Corns and callus     Diabetic ulcer of left heel associated with type 2 DM 2021    Diabetic ulcer of left heel associated with type 2 DM 2021    Diabetic ulcer of right midfoot associated with type 2 DM 2021    Difficulty walking     Essential hypertension 2021    Hammertoe     Hyperlipidemia LDL goal <100 2021    Ingrown toenail     Obesity     Paroxysmal atrial fibrillation 2021    Polyneuropathy     Pressure ulcer, stage 1     Tinea unguium     Type 2 diabetes mellitus with polyneuropathy         Past Surgical History:   Procedure Laterality Date    CYST REMOVAL      center of back; benign    EYE SURGERY      INCISION AND DRAINAGE ABSCESS      back    INCISION AND DRAINAGE LEG Right 12/10/2021    Procedure: INCISION AND DRAINAGE LOWER EXTREMITY;  Surgeon: Ash Leyva DPM;  Location: [facility] MAIN OR;  Service: Podiatry;  Laterality: Right;    OTHER SURGICAL HISTORY      Surgical clips left foot    TOE SURGERY Right     Removal of 5th toe    TRANS METATARSAL AMPUTATION Right 2021    Procedure: AMPUTATION TRANS METATARSAL;  Surgeon: Ash Leyva DPM;  Location: [facility] MAIN OR;  Service: Podiatry;  Laterality: Right;    VASCULAR SURGERY      WRIST SURGERY Left     repair of  injury         Physical Assessment:  Wound 12/19/24 0259 Right anterior foot (Active)   Wound Image   12/30/24 1540   Dressing Appearance dry;intact 12/30/24 1540   Closure None 12/30/24 1540   Base dry;closed/resurfaced;maroon/purple;red 12/30/24 1540   Periwound dry;pink 12/30/24 1540   Periwound Temperature warm 12/30/24 1540   Periwound Skin Turgor soft 12/30/24 1540   Edges rolled/closed 12/30/24 1540   Wound Length (cm) 2.9 cm 12/30/24 1540   Wound Width (cm) 2.2 cm 12/30/24 1540   Wound Depth (cm) 0 cm 12/30/24 1540   Wound Surface Area (cm^2) 6.38 cm^2 12/30/24 1540   Wound Volume (cm^3) 0 cm^3 12/30/24 1540   Drainage Amount none 12/30/24 1540   Care, Wound cleansed with;sterile normal saline 12/30/24 1540   Dressing Care dressing removed;dressing applied;non-adherent;petroleum-based;gauze;silicone border foam 12/30/24 1540   Periwound Care dry periwound area maintained 12/30/24 1540       Wound Left proximal arm (Active)   Wound Image   12/30/24 1540   Dressing Appearance open to air 12/30/24 1540   Closure None 12/30/24 1540   Base moist;red;dry 12/30/24 1540   Red (%), Wound Tissue Color 100 12/30/24 1540   Periwound dry;ecchymotic 12/30/24 1540   Periwound Skin Turgor soft 12/30/24 1540   Edges open 12/30/24 1540   Drainage Characteristics/Odor serosanguineous 12/30/24 1540   Drainage Amount scant 12/30/24 1540   Care, Wound cleansed with;sterile normal saline 12/30/24 1540   Dressing Care dressing applied;non-adherent;petroleum-based;gauze;silicone border foam 12/30/24 1540   Periwound Care absorptive dressing applied 12/30/24 1540       Wound 12/30/24 1540 Right lower leg unspecified (Active)   Wound Image   12/30/24 1540   Dressing Appearance dry;intact 12/30/24 1540   Closure None 12/30/24 1540   Base moist;red 12/30/24 1540   Red (%), Wound Tissue Color 100 12/30/24 1540   Periwound dry;pink 12/30/24 1540   Periwound Temperature warm 12/30/24 1540   Periwound Skin Turgor soft 12/30/24 1540   Edges  open 12/30/24 1540   Wound Length (cm) 1 cm 12/30/24 1540   Wound Width (cm) 1 cm 12/30/24 1540   Wound Depth (cm) 0.1 cm 12/30/24 1540   Wound Surface Area (cm^2) 1 cm^2 12/30/24 1540   Wound Volume (cm^3) 0.1 cm^3 12/30/24 1540   Drainage Characteristics/Odor serosanguineous 12/30/24 1540   Drainage Amount scant 12/30/24 1540   Care, Wound cleansed with;sterile normal saline 12/30/24 1540   Dressing Care dressing removed;dressing applied;non-adherent;petroleum-based;gauze;silicone border foam 12/30/24 1540   Periwound Care absorptive dressing applied 12/30/24 1540       Wound 12/30/24 1540 Right anterior ankle pressure injury (Active)   Pressure Injury Stage DTPI 12/30/24 1540   Dressing Appearance dry;intact 12/30/24 1540   Closure None 12/30/24 1540   Base closed/resurfaced;dry;maroon/purple;nonblanchable 12/30/24 1540   Periwound dry;pink 12/30/24 1540   Periwound Temperature warm 12/30/24 1540   Periwound Skin Turgor soft 12/30/24 1540   Edges rolled/closed 12/30/24 1540   Wound Length (cm) 1.1 cm 12/30/24 1540   Wound Width (cm) 0.8 cm 12/30/24 1540   Wound Depth (cm) 0 cm 12/30/24 1540   Wound Surface Area (cm^2) 0.88 cm^2 12/30/24 1540   Wound Volume (cm^3) 0 cm^3 12/30/24 1540   Drainage Amount none 12/30/24 1540   Care, Wound cleansed with;sterile normal saline 12/30/24 1540   Dressing Care dressing removed;dressing applied;non-adherent;petroleum-based;gauze;silicone border foam 12/30/24 1540   Periwound Care dry periwound area maintained 12/30/24 1540       Wound 12/30/24 1540 Right posterior ankle pressure injury (Active)   Wound Image   12/30/24 1540   Pressure Injury Stage DTPI 12/30/24 1540   Dressing Appearance dry;intact 12/30/24 1540   Closure None 12/30/24 1540   Base closed/resurfaced;dry;maroon/purple;nonblanchable 12/30/24 1540   Periwound dry;pink 12/30/24 1540   Periwound Temperature warm 12/30/24 1540   Periwound Skin Turgor soft 12/30/24 1540   Edges rolled/closed 12/30/24 1540   Wound  Length (cm) 1.8 cm 12/30/24 1540   Wound Width (cm) 1 cm 12/30/24 1540   Wound Depth (cm) 0 cm 12/30/24 1540   Wound Surface Area (cm^2) 1.8 cm^2 12/30/24 1540   Wound Volume (cm^3) 0 cm^3 12/30/24 1540   Drainage Amount none 12/30/24 1540   Care, Wound cleansed with;sterile normal saline 12/30/24 1540   Dressing Care dressing removed;dressing applied;non-adherent;petroleum-based;gauze;silicone border foam 12/30/24 1540   Periwound Care dry periwound area maintained 12/30/24 1540       Wound 12/30/24 1540 Right lateral leg unspecified (Active)   Wound Image   12/30/24 1540   Dressing Appearance dry;intact 12/30/24 1540   Closure None 12/30/24 1540   Base moist;red 12/30/24 1540   Red (%), Wound Tissue Color 100 12/30/24 1540   Periwound dry;pink 12/30/24 1540   Periwound Temperature warm 12/30/24 1540   Periwound Skin Turgor soft 12/30/24 1540   Edges open 12/30/24 1540   Wound Length (cm) 0.2 cm 12/30/24 1540   Wound Width (cm) 0.3 cm 12/30/24 1540   Wound Depth (cm) 0.1 cm 12/30/24 1540   Wound Surface Area (cm^2) 0.06 cm^2 12/30/24 1540   Wound Volume (cm^3) 0.006 cm^3 12/30/24 1540   Drainage Characteristics/Odor serosanguineous 12/30/24 1540   Drainage Amount scant 12/30/24 1540   Care, Wound cleansed with;sterile normal saline 12/30/24 1540   Dressing Care dressing removed;dressing applied;non-adherent;petroleum-based;gauze;silicone border foam 12/30/24 1540   Periwound Care absorptive dressing applied 12/30/24 1540      Wound Check / Follow-up:  Patient seen today for wound follow up. Patient is awake, alert, and oriented. Primary RN and PCA present during assessment and bath was performed. Patient refuses to have heels elevated off of bed due to pressure and discomfort placed on joints. Educated patient on the risk of tissue breakdown with stagnant pressure and encouraged to frequently reposition if unable to allow staff to offset with a pillow. Reinforced importance of pressure redistribution. Patient is on  a specialty bed.    Generalized moisture breakdown and edema to bilateral lower extremities appear to be resolving. No areas of open tissue noted to creases. Recommending to continue daily skin care with application of corn starch powder to folds. Recommending daily skin care with application of Aquaphor to gluteal aspects, legs, feet, scrotum, groin, and elbows once a day. Recommending application of blue top moisture barrier to gluteal aspects, scrotum, and groin three times a day and as needed for incontinence. Implement every two hour turns and offload at all times.    Ulcerations noted to right anterior and lateral lower leg within area of pink hyperpigmentation with excess, crusted yellow tissue.  Traumatic injury noted to distal aspect of residual right foot. Wound base is closed with ecchymosis and red coloration noted. Periwound tissue is dry and pink.  Deep tissue pressure injury noted to anterior and posterior aspect of right ankle. Wound bases are closed with non-blanchable maroon/purple coloration. Periwound tissue is dry with pink coloration.  Cleansed all areas with normal saline and gauze, blotted dry.  Recommending daily dressing changes with non-adherent, petroleum-based gauze and silicone border dressing securement.  Due to decrease in edema/absence of weeping tissue and presence of pressure injuries, compression wraps will be held at this time. Requested primary RN to inform wound care if edema or weeping evolve.    Traumatic injury to left upper arm. Proximal wound base is dry with red tissue noted. Distal wound base is primarily re approximated with a moist red wound base visible. Periwound tissue is dry with ecchymosis. Cleansed both areas with normal saline and gauze, blotted dry. Recommending daily dressing changes with non-adherent, petroleum-based gauze and silicone border dressing securement.    Impression: Resolving MASD and edema. Ulcerations to right lower leg. Traumatic injuries to  right residual foot and left arm. DTPIs to right posterior and anterior ankle.    Short term goals:  Regain skin integrity, skin protection, moisture prevention, pressure reduction, quality skin care and hygiene, daily dressing changes.    Greer North RN    12/30/2024    16:20 EST

## 2024-12-30 NOTE — PROGRESS NOTES
[facility]   Hospitalist Progress Note  Date: 2024  Patient Name: Jose Shaikh  : 1960  MRN: [id number]  Date of admission: 2024      Subjective   Subjective     Chief Complaint: Lower extremity swelling    Summary: 64-year-old man who presents to the emergency room for worsening lower extremity swelling, shortness of breath, and suicidal ideation.  Patient also states that he does not have anybody at home to take care of him.  He has chronic leg wounds.  He has a history of diabetes atrial fibrillation ulcers.  The patient states that he is going to take all his muscle relaxants to kill himself because he cannot go on living like this.  He was admitted for further care, psychiatry was consulted.  Patient was no longer suicidal after he was admitted to the hospital and bedside sitter was discontinued.  He was started on Wellbutrin per psychiatry recommendations.  He was diuresed aggressively, King catheter was placed by urology.  Wound care following.  Can have a voiding trial prior to discharge.  Having good urine output with over 25 L of fluid removed since admission.  Plan is to go home with home health after discharge when medically ready.    Interval Followup:     Patient states he has not been out of the bed.  Patient states he is nonambulatory at baseline  Patient continues to have indwelling King catheter   Continues to have an negative balance however I do not believe it was charted appropriately as he was initially -27 L and now much less  Patient states that he feels like the cramps in his leg are due to his chronic hip that needs to be replaced  Patient is agreeable to restart diuretics    Objective   Objective     Vitals:   Temp:  [97.8 °F (36.6 °C)-98.2 °F (36.8 °C)] 98.2 °F (36.8 °C)  Heart Rate:  [72-90] 72  Resp:  [16-20] 18  BP: (106-141)/(54-81) 106/59  Physical Exam   General: Obese, no acute distress, resting in bed   Respiratory: no wheezing   Cardiovascular:RRR, no  murmur  Abdomen: obese NT  MSK: Bilateral lower extremities in Ace bandages,  Neurologic: Alert and oriented  : peralta in place with yellow urine noted     Result Review    I have personally reviewed the results below:  [x]  Laboratory personally reviewed BMP, CBC, magnesium, phosphorus  []  Microbiology  []  Radiology  []  EKG/Telemetry   []  Cardiology/Vascular   []  Pathology  []  Old records  []  Other:  CBC          12/28/2024    06:10 12/29/2024    06:53 12/30/2024    05:16   CBC   WBC 6.04  5.63  6.03    RBC 4.56  4.48  4.59    Hemoglobin 9.2  9.0  9.2    Hematocrit 32.3  31.7  32.5    MCV 70.8  70.8  70.8    MCH 20.2  20.1  20.0    MCHC 28.5  28.4  28.3    RDW 22.5  22.5  22.5    Platelets 145  135  143      CMP          12/28/2024    06:10 12/29/2024    06:53 12/30/2024    05:16   CMP   Glucose 144  203  147    BUN 11  11  10    Creatinine 0.48  0.45  0.34    EGFR 113.9  116.1  126.4    Sodium 136  134  136    Potassium 3.6  4.0  3.7    Chloride 99  98  100    Calcium 8.0  7.9  7.9    BUN/Creatinine Ratio 22.9  24.4  29.4    Anion Gap 8.5  8.1  9.8        Assessment & Plan   Assessment / Plan   Acute on chronic diastolic congestive heart failure with acute exacerbation  Suicidal ideation  Chronic bilateral foot wounds  Paroxysmal atrial fibrillation  Type 2 diabetes mellitus  Morbid obesity with BMI of 52  Debility with wheelchair dependence  Osteoarthritis of the hip  Chronic venous stasis  Deconditioning  Hypomagnesemia  Buried penis  Hypophosphatemia  Hemorrhoids  Anasarca    PLAN   Continue to monitor in the hospital for workup and management of the above  Continue Wellbutrin per psychiatry recommendations  Resume Lasix  Continue Peralta catheter for now, which was placed by urology due to urinary retention, penile edema  Continue Tylenol, baclofen, or Ultram as needed for pain  Wound care following, appreciate assistance. Continue with lower extremity compression  Continue oral iron supplement  Blood  sugars acceptable, continue Lantus 10 units daily, continue with sliding scale insulin  Continue appropriate home medications  PT/OT/social work consulted. Patient states unable to ambulate or care for himself at home   Trend renal function and electrolytes with a.m. BMP, magnesium   Trend Hgb and WBC with a.m. CBC     Discussed plan with RN    VTE Prophylaxis:  Pharmacologic & mechanical VTE prophylaxis orders are present.        CODE STATUS:   Level Of Support Discussed With: Patient  Code Status (Patient has no pulse and is not breathing): CPR (Attempt to Resuscitate)  Medical Interventions (Patient has pulse or is breathing): Full Support

## 2024-12-31 LAB
ANION GAP SERPL CALCULATED.3IONS-SCNC: 6.6 MMOL/L (ref 5–15)
BASOPHILS # BLD AUTO: 0.05 10*3/MM3 (ref 0–0.2)
BASOPHILS NFR BLD AUTO: 0.9 % (ref 0–1.5)
BUN SERPL-MCNC: 10 MG/DL (ref 8–23)
BUN/CREAT SERPL: 20.4 (ref 7–25)
CALCIUM SPEC-SCNC: 8 MG/DL (ref 8.6–10.5)
CHLORIDE SERPL-SCNC: 99 MMOL/L (ref 98–107)
CO2 SERPL-SCNC: 29.4 MMOL/L (ref 22–29)
CREAT SERPL-MCNC: 0.49 MG/DL (ref 0.76–1.27)
DEPRECATED RDW RBC AUTO: 55.4 FL (ref 37–54)
EGFRCR SERPLBLD CKD-EPI 2021: 113.2 ML/MIN/1.73
EOSINOPHIL # BLD AUTO: 0.16 10*3/MM3 (ref 0–0.4)
EOSINOPHIL NFR BLD AUTO: 3 % (ref 0.3–6.2)
ERYTHROCYTE [DISTWIDTH] IN BLOOD BY AUTOMATED COUNT: 22.5 % (ref 12.3–15.4)
GLUCOSE BLDC GLUCOMTR-MCNC: 138 MG/DL (ref 70–99)
GLUCOSE BLDC GLUCOMTR-MCNC: 173 MG/DL (ref 70–99)
GLUCOSE BLDC GLUCOMTR-MCNC: 195 MG/DL (ref 70–99)
GLUCOSE BLDC GLUCOMTR-MCNC: 267 MG/DL (ref 70–99)
GLUCOSE SERPL-MCNC: 136 MG/DL (ref 65–99)
HCT VFR BLD AUTO: 34.2 % (ref 37.5–51)
HGB BLD-MCNC: 9.6 G/DL (ref 13–17.7)
IMM GRANULOCYTES # BLD AUTO: 0.01 10*3/MM3 (ref 0–0.05)
IMM GRANULOCYTES NFR BLD AUTO: 0.2 % (ref 0–0.5)
LYMPHOCYTES # BLD AUTO: 0.83 10*3/MM3 (ref 0.7–3.1)
LYMPHOCYTES NFR BLD AUTO: 15.6 % (ref 19.6–45.3)
MAGNESIUM SERPL-MCNC: 1.6 MG/DL (ref 1.6–2.4)
MCH RBC QN AUTO: 20 PG (ref 26.6–33)
MCHC RBC AUTO-ENTMCNC: 28.1 G/DL (ref 31.5–35.7)
MCV RBC AUTO: 71.1 FL (ref 79–97)
MICROCYTES BLD QL: NORMAL
MONOCYTES # BLD AUTO: 0.62 10*3/MM3 (ref 0.1–0.9)
MONOCYTES NFR BLD AUTO: 11.7 % (ref 5–12)
NEUTROPHILS NFR BLD AUTO: 3.65 10*3/MM3 (ref 1.7–7)
NEUTROPHILS NFR BLD AUTO: 68.6 % (ref 42.7–76)
NRBC BLD AUTO-RTO: 0 /100 WBC (ref 0–0.2)
OVALOCYTES BLD QL SMEAR: NORMAL
PHOSPHATE SERPL-MCNC: 3.2 MG/DL (ref 2.5–4.5)
PLATELET # BLD AUTO: 145 10*3/MM3 (ref 140–450)
PMV BLD AUTO: ABNORMAL FL
POLYCHROMASIA BLD QL SMEAR: NORMAL
POTASSIUM SERPL-SCNC: 3.8 MMOL/L (ref 3.5–5.2)
RBC # BLD AUTO: 4.81 10*6/MM3 (ref 4.14–5.8)
SMALL PLATELETS BLD QL SMEAR: ADEQUATE
SODIUM SERPL-SCNC: 135 MMOL/L (ref 136–145)
WBC MORPH BLD: NORMAL
WBC NRBC COR # BLD AUTO: 5.32 10*3/MM3 (ref 3.4–10.8)

## 2024-12-31 PROCEDURE — 63710000001 INSULIN GLARGINE PER 5 UNITS: Performed by: INTERNAL MEDICINE

## 2024-12-31 PROCEDURE — 99232 SBSQ HOSP IP/OBS MODERATE 35: CPT | Performed by: INTERNAL MEDICINE

## 2024-12-31 PROCEDURE — 83735 ASSAY OF MAGNESIUM: CPT | Performed by: INTERNAL MEDICINE

## 2024-12-31 PROCEDURE — 63710000001 INSULIN LISPRO (HUMAN) PER 5 UNITS: Performed by: HOSPITALIST

## 2024-12-31 PROCEDURE — 82948 REAGENT STRIP/BLOOD GLUCOSE: CPT | Performed by: HOSPITALIST

## 2024-12-31 PROCEDURE — 82948 REAGENT STRIP/BLOOD GLUCOSE: CPT

## 2024-12-31 PROCEDURE — 85007 BL SMEAR W/DIFF WBC COUNT: CPT | Performed by: INTERNAL MEDICINE

## 2024-12-31 PROCEDURE — 84100 ASSAY OF PHOSPHORUS: CPT | Performed by: INTERNAL MEDICINE

## 2024-12-31 PROCEDURE — 85025 COMPLETE CBC W/AUTO DIFF WBC: CPT | Performed by: INTERNAL MEDICINE

## 2024-12-31 PROCEDURE — 25010000002 FUROSEMIDE PER 20 MG: Performed by: INTERNAL MEDICINE

## 2024-12-31 PROCEDURE — 80048 BASIC METABOLIC PNL TOTAL CA: CPT | Performed by: INTERNAL MEDICINE

## 2024-12-31 RX ADMIN — FUROSEMIDE 40 MG: 10 INJECTION, SOLUTION INTRAMUSCULAR; INTRAVENOUS at 08:47

## 2024-12-31 RX ADMIN — INSULIN GLARGINE 10 UNITS: 100 INJECTION, SOLUTION SUBCUTANEOUS at 08:47

## 2024-12-31 RX ADMIN — Medication 10 ML: at 08:48

## 2024-12-31 RX ADMIN — BACLOFEN 20 MG: 10 TABLET ORAL at 17:30

## 2024-12-31 RX ADMIN — TRAMADOL HYDROCHLORIDE 100 MG: 50 TABLET, COATED ORAL at 00:50

## 2024-12-31 RX ADMIN — APIXABAN 5 MG: 5 TABLET, FILM COATED ORAL at 20:00

## 2024-12-31 RX ADMIN — BUPROPION HYDROCHLORIDE 150 MG: 150 TABLET, EXTENDED RELEASE ORAL at 08:48

## 2024-12-31 RX ADMIN — ATORVASTATIN CALCIUM 10 MG: 10 TABLET, FILM COATED ORAL at 20:00

## 2024-12-31 RX ADMIN — OXYCODONE 5 MG: 5 TABLET ORAL at 04:45

## 2024-12-31 RX ADMIN — INSULIN LISPRO 6 UNITS: 100 INJECTION, SOLUTION INTRAVENOUS; SUBCUTANEOUS at 20:00

## 2024-12-31 RX ADMIN — Medication 10 ML: at 20:01

## 2024-12-31 RX ADMIN — BACLOFEN 20 MG: 10 TABLET ORAL at 08:54

## 2024-12-31 RX ADMIN — TRAMADOL HYDROCHLORIDE 100 MG: 50 TABLET, COATED ORAL at 17:30

## 2024-12-31 RX ADMIN — BACLOFEN 20 MG: 10 TABLET ORAL at 00:50

## 2024-12-31 RX ADMIN — TRAMADOL HYDROCHLORIDE 100 MG: 50 TABLET, COATED ORAL at 08:54

## 2024-12-31 RX ADMIN — SERTRALINE 50 MG: 50 TABLET, FILM COATED ORAL at 20:00

## 2024-12-31 RX ADMIN — OXYCODONE 5 MG: 5 TABLET ORAL at 14:07

## 2024-12-31 RX ADMIN — DIGOXIN 125 MCG: 250 TABLET ORAL at 12:30

## 2024-12-31 RX ADMIN — APIXABAN 5 MG: 5 TABLET, FILM COATED ORAL at 08:48

## 2024-12-31 RX ADMIN — INSULIN LISPRO 2 UNITS: 100 INJECTION, SOLUTION INTRAVENOUS; SUBCUTANEOUS at 17:30

## 2024-12-31 RX ADMIN — INSULIN LISPRO 2 UNITS: 100 INJECTION, SOLUTION INTRAVENOUS; SUBCUTANEOUS at 12:30

## 2024-12-31 NOTE — PLAN OF CARE
Goal Outcome Evaluation:  Plan of Care Reviewed With: patient        Progress: no change  Outcome Evaluation: Patient complained of pain and medicated per orders, vitals stable, refusing turns and wound care.

## 2024-12-31 NOTE — PROGRESS NOTES
[facility]   Hospitalist Progress Note  Date: 2024  Patient Name: Jose Shaikh  : 1960  MRN: [id number]  Date of admission: 2024      Subjective   Subjective     Chief Complaint: Lower extremity swelling    Summary: 64-year-old man who presents to the emergency room for worsening lower extremity swelling, shortness of breath, and suicidal ideation.  Patient also states that he does not have anybody at home to take care of him.  He has chronic leg wounds.  He has a history of diabetes atrial fibrillation ulcers.  The patient states that he is going to take all his muscle relaxants to kill himself because he cannot go on living like this.  He was admitted for further care, psychiatry was consulted.  Patient was no longer suicidal after he was admitted to the hospital and bedside sitter was discontinued.  He was started on Wellbutrin per psychiatry recommendations.  He was diuresed aggressively, King catheter was placed by urology.  Wound care following.  Can have a voiding trial prior to discharge.  Having good urine output with over 25 L of fluid removed since admission.  Plan is to go home with home health after discharge when medically ready.    Interval Followup:     Patient states he has not been out of the bed.  Patient states he is nonambulatory at baseline  Patient states that he is unable to even sit on the side of the bed  Patient continues to have indwelling King catheter   Continues to have an negative balance however I do not believe it was charted appropriately as he was initially -27 L and now much less  Patient states that he feels like the cramps in his leg are due to his chronic hip that needs to be replaced  Patient is agreeable to restart diuretics  At this time  is unable to find any bed offers.    Objective   Objective     Vitals:   Temp:  [97.5 °F (36.4 °C)-98.2 °F (36.8 °C)] 98.1 °F (36.7 °C)  Heart Rate:  [70-75] 75  Resp:  [16-18] 16  BP:  (105-117)/(52-64) 114/52  Physical Exam   General: Obese, no acute distress, resting in bed   Respiratory: no wheezing   Cardiovascular:RRR, no murmur  Abdomen: obese NT  MSK: Bilateral lower extremities in Ace bandages,  Neurologic: Alert and oriented  : peralta in place with yellow urine noted     Result Review    I have personally reviewed the results below:  [x]  Laboratory personally reviewed BMP, CBC, magnesium, phosphorus  []  Microbiology  []  Radiology  []  EKG/Telemetry   []  Cardiology/Vascular   []  Pathology  []  Old records  []  Other:  CBC          12/29/2024    06:53 12/30/2024    05:16 12/31/2024    05:51   CBC   WBC 5.63  6.03  5.32    RBC 4.48  4.59  4.81    Hemoglobin 9.0  9.2  9.6    Hematocrit 31.7  32.5  34.2    MCV 70.8  70.8  71.1    MCH 20.1  20.0  20.0    MCHC 28.4  28.3  28.1    RDW 22.5  22.5  22.5    Platelets 135  143  145      CMP          12/29/2024    06:53 12/30/2024    05:16 12/31/2024    05:51   CMP   Glucose 203  147  136    BUN 11  10  10    Creatinine 0.45  0.34  0.49    EGFR 116.1  126.4  113.2    Sodium 134  136  135    Potassium 4.0  3.7  3.8    Chloride 98  100  99    Calcium 7.9  7.9  8.0    BUN/Creatinine Ratio 24.4  29.4  20.4    Anion Gap 8.1  9.8  6.6        Assessment & Plan   Assessment / Plan   Acute on chronic diastolic congestive heart failure with acute exacerbation  Suicidal ideation  Chronic bilateral foot wounds  Paroxysmal atrial fibrillation  Type 2 diabetes mellitus  Morbid obesity with BMI of 52  Debility with wheelchair dependence  Osteoarthritis of the hip  Chronic venous stasis  Deconditioning  Hypomagnesemia  Buried penis  Hypophosphatemia  Hemorrhoids  Anasarca    PLAN   Continue to monitor in the hospital for workup and management of the above  Continue Wellbutrin per psychiatry recommendations  Continue with IV Lasix  Continue Peralta catheter for now, which was placed by urology due to urinary retention, penile edema  Continue Tylenol, baclofen, or  Ultram as needed for pain  Wound care following, appreciate assistance. Continue with lower extremity compression  Continue oral iron supplement  Blood sugars acceptable, continue Lantus 10 units daily, continue with sliding scale insulin  Continue appropriate home medications  PT/OT/social work consulted. Patient states unable to ambulate or care for himself at home   Trend renal function and electrolytes with a.m. BMP, magnesium   Trend Hgb and WBC with a.m. CBC     Discussed plan with RN  Disposition: At this time  is unable to find any bed offers.  Patient is on a waiting list which would provide a home health aide however he remains on the waiting list he is nonambulatory at baseline.    VTE Prophylaxis:  Pharmacologic & mechanical VTE prophylaxis orders are present.        CODE STATUS:   Level Of Support Discussed With: Patient  Code Status (Patient has no pulse and is not breathing): CPR (Attempt to Resuscitate)  Medical Interventions (Patient has pulse or is breathing): Full Support

## 2024-12-31 NOTE — PLAN OF CARE
Goal Outcome Evaluation:  Plan of Care Reviewed With: patient        Progress: no change  Outcome Evaluation: Patient complains of significant hip pain, medicated with tramadol and baclofen when avalible. Patient also received oxycodone this shift as well. Patient refusing all turns this shift when education provided or pain managment offered patient still refused turns. Patient is alert and oriented x4. Wound care nurse and primary RN able to preform wound care this shift and assess skin well. Patient on a baratric pressure distributing matress, however patient refuses to float heels as well on pillows, this was also adressed with [facility] [place] at bedside, of which she involved the patient in progress of wounds by showing patient his current wound pics. She educated that he has a new spot and this RN and [facility] nurse enrouarged shifting of moving feet and ankels to prevent further breakdown if the patient is refusing pillow support. Postive reinforcement utilized and encouragement with patient to participate in more self-care. Patient still incontentient of bowel and peralta cath still remains in place. No need for irrigation this shift but night shift RN aware to monitor peralta patency as patient was started on IV lasix to help with signifcant scrotal swelling, bilateral thigh/leg and perineum edema 3 to 4+ and pitting.

## 2024-12-31 NOTE — PLAN OF CARE
Goal Outcome Evaluation:           Progress: no change  Outcome Evaluation: Pt remains A&Ox4. C/o right hip pain that radiates to right knee and muscle spasms. One time dose of flexeril added per ramu zamora r/t pt stating baclofen not effective. Medicated with prn muscle relaxer and pain medication throughout shift. Pt is refusing turns despite education. Wound dressing remain CDI. Cath care complete. Call light in reach, able to make needs known. No further issues/needs at this time.

## 2025-01-01 LAB
ANION GAP SERPL CALCULATED.3IONS-SCNC: 8.4 MMOL/L (ref 5–15)
BASOPHILS # BLD AUTO: 0.04 10*3/MM3 (ref 0–0.2)
BASOPHILS NFR BLD AUTO: 0.5 % (ref 0–1.5)
BUN SERPL-MCNC: 11 MG/DL (ref 8–23)
BUN/CREAT SERPL: 23.9 (ref 7–25)
CALCIUM SPEC-SCNC: 7.9 MG/DL (ref 8.6–10.5)
CHLORIDE SERPL-SCNC: 96 MMOL/L (ref 98–107)
CO2 SERPL-SCNC: 28.6 MMOL/L (ref 22–29)
CREAT SERPL-MCNC: 0.46 MG/DL (ref 0.76–1.27)
DEPRECATED RDW RBC AUTO: 53.9 FL (ref 37–54)
EGFRCR SERPLBLD CKD-EPI 2021: 115.4 ML/MIN/1.73
EOSINOPHIL # BLD AUTO: 0.11 10*3/MM3 (ref 0–0.4)
EOSINOPHIL NFR BLD AUTO: 1.3 % (ref 0.3–6.2)
ERYTHROCYTE [DISTWIDTH] IN BLOOD BY AUTOMATED COUNT: 22.3 % (ref 12.3–15.4)
GLUCOSE BLDC GLUCOMTR-MCNC: 151 MG/DL (ref 70–99)
GLUCOSE BLDC GLUCOMTR-MCNC: 154 MG/DL (ref 70–99)
GLUCOSE BLDC GLUCOMTR-MCNC: 208 MG/DL (ref 70–99)
GLUCOSE BLDC GLUCOMTR-MCNC: 236 MG/DL (ref 70–99)
GLUCOSE SERPL-MCNC: 205 MG/DL (ref 65–99)
HCT VFR BLD AUTO: 31.7 % (ref 37.5–51)
HGB BLD-MCNC: 9.2 G/DL (ref 13–17.7)
IMM GRANULOCYTES # BLD AUTO: 0.04 10*3/MM3 (ref 0–0.05)
IMM GRANULOCYTES NFR BLD AUTO: 0.5 % (ref 0–0.5)
LYMPHOCYTES # BLD AUTO: 0.86 10*3/MM3 (ref 0.7–3.1)
LYMPHOCYTES NFR BLD AUTO: 10.5 % (ref 19.6–45.3)
MAGNESIUM SERPL-MCNC: 1.6 MG/DL (ref 1.6–2.4)
MCH RBC QN AUTO: 20.2 PG (ref 26.6–33)
MCHC RBC AUTO-ENTMCNC: 29 G/DL (ref 31.5–35.7)
MCV RBC AUTO: 69.7 FL (ref 79–97)
MONOCYTES # BLD AUTO: 0.88 10*3/MM3 (ref 0.1–0.9)
MONOCYTES NFR BLD AUTO: 10.8 % (ref 5–12)
NEUTROPHILS NFR BLD AUTO: 6.23 10*3/MM3 (ref 1.7–7)
NEUTROPHILS NFR BLD AUTO: 76.4 % (ref 42.7–76)
NRBC BLD AUTO-RTO: 0 /100 WBC (ref 0–0.2)
PHOSPHATE SERPL-MCNC: 2.8 MG/DL (ref 2.5–4.5)
PLATELET # BLD AUTO: 139 10*3/MM3 (ref 140–450)
POTASSIUM SERPL-SCNC: 3.4 MMOL/L (ref 3.5–5.2)
RBC # BLD AUTO: 4.55 10*6/MM3 (ref 4.14–5.8)
SODIUM SERPL-SCNC: 133 MMOL/L (ref 136–145)
WBC NRBC COR # BLD AUTO: 8.16 10*3/MM3 (ref 3.4–10.8)

## 2025-01-01 PROCEDURE — 82948 REAGENT STRIP/BLOOD GLUCOSE: CPT

## 2025-01-01 PROCEDURE — 63710000001 INSULIN GLARGINE PER 5 UNITS: Performed by: INTERNAL MEDICINE

## 2025-01-01 PROCEDURE — 84100 ASSAY OF PHOSPHORUS: CPT | Performed by: INTERNAL MEDICINE

## 2025-01-01 PROCEDURE — 85025 COMPLETE CBC W/AUTO DIFF WBC: CPT | Performed by: INTERNAL MEDICINE

## 2025-01-01 PROCEDURE — 99232 SBSQ HOSP IP/OBS MODERATE 35: CPT | Performed by: FAMILY MEDICINE

## 2025-01-01 PROCEDURE — 63710000001 INSULIN LISPRO (HUMAN) PER 5 UNITS: Performed by: HOSPITALIST

## 2025-01-01 PROCEDURE — 80048 BASIC METABOLIC PNL TOTAL CA: CPT | Performed by: INTERNAL MEDICINE

## 2025-01-01 PROCEDURE — 82948 REAGENT STRIP/BLOOD GLUCOSE: CPT | Performed by: HOSPITALIST

## 2025-01-01 PROCEDURE — 25010000002 FUROSEMIDE PER 20 MG: Performed by: FAMILY MEDICINE

## 2025-01-01 PROCEDURE — 83735 ASSAY OF MAGNESIUM: CPT | Performed by: INTERNAL MEDICINE

## 2025-01-01 PROCEDURE — 25010000002 FUROSEMIDE PER 20 MG: Performed by: INTERNAL MEDICINE

## 2025-01-01 RX ORDER — CYCLOBENZAPRINE HCL 5 MG
5 TABLET ORAL ONCE AS NEEDED
Status: COMPLETED | OUTPATIENT
Start: 2025-01-01 | End: 2025-01-01

## 2025-01-01 RX ORDER — POTASSIUM CHLORIDE 750 MG/1
40 CAPSULE, EXTENDED RELEASE ORAL
Status: COMPLETED | OUTPATIENT
Start: 2025-01-01 | End: 2025-01-01

## 2025-01-01 RX ORDER — FUROSEMIDE 10 MG/ML
40 INJECTION INTRAMUSCULAR; INTRAVENOUS
Status: DISCONTINUED | OUTPATIENT
Start: 2025-01-01 | End: 2025-01-06

## 2025-01-01 RX ADMIN — FUROSEMIDE 40 MG: 10 INJECTION, SOLUTION INTRAMUSCULAR; INTRAVENOUS at 18:46

## 2025-01-01 RX ADMIN — METOPROLOL SUCCINATE 12.5 MG: 25 TABLET, EXTENDED RELEASE ORAL at 09:28

## 2025-01-01 RX ADMIN — BACLOFEN 20 MG: 10 TABLET ORAL at 20:46

## 2025-01-01 RX ADMIN — INSULIN LISPRO 4 UNITS: 100 INJECTION, SOLUTION INTRAVENOUS; SUBCUTANEOUS at 18:46

## 2025-01-01 RX ADMIN — INSULIN GLARGINE 10 UNITS: 100 INJECTION, SOLUTION SUBCUTANEOUS at 09:28

## 2025-01-01 RX ADMIN — CYCLOBENZAPRINE HYDROCHLORIDE 5 MG: 5 TABLET, FILM COATED ORAL at 03:32

## 2025-01-01 RX ADMIN — OXYCODONE 5 MG: 5 TABLET ORAL at 12:29

## 2025-01-01 RX ADMIN — WHITE PETROLATUM 1 APPLICATION: 1.75 OINTMENT TOPICAL at 18:51

## 2025-01-01 RX ADMIN — OXYCODONE 5 MG: 5 TABLET ORAL at 01:04

## 2025-01-01 RX ADMIN — Medication 800 MG: at 12:29

## 2025-01-01 RX ADMIN — FUROSEMIDE 40 MG: 10 INJECTION, SOLUTION INTRAMUSCULAR; INTRAVENOUS at 09:28

## 2025-01-01 RX ADMIN — APIXABAN 5 MG: 5 TABLET, FILM COATED ORAL at 20:45

## 2025-01-01 RX ADMIN — BUPROPION HYDROCHLORIDE 150 MG: 150 TABLET, EXTENDED RELEASE ORAL at 09:28

## 2025-01-01 RX ADMIN — Medication 10 ML: at 09:29

## 2025-01-01 RX ADMIN — TRAMADOL HYDROCHLORIDE 100 MG: 50 TABLET, COATED ORAL at 15:44

## 2025-01-01 RX ADMIN — OXYCODONE 5 MG: 5 TABLET ORAL at 20:46

## 2025-01-01 RX ADMIN — BACLOFEN 20 MG: 10 TABLET ORAL at 12:28

## 2025-01-01 RX ADMIN — ATORVASTATIN CALCIUM 10 MG: 10 TABLET, FILM COATED ORAL at 20:45

## 2025-01-01 RX ADMIN — POTASSIUM CHLORIDE 40 MEQ: 750 CAPSULE, EXTENDED RELEASE ORAL at 18:45

## 2025-01-01 RX ADMIN — DIGOXIN 125 MCG: 250 TABLET ORAL at 12:28

## 2025-01-01 RX ADMIN — BACLOFEN 20 MG: 10 TABLET ORAL at 01:04

## 2025-01-01 RX ADMIN — TRAMADOL HYDROCHLORIDE 100 MG: 50 TABLET, COATED ORAL at 09:28

## 2025-01-01 RX ADMIN — APIXABAN 5 MG: 5 TABLET, FILM COATED ORAL at 09:28

## 2025-01-01 RX ADMIN — INSULIN LISPRO 2 UNITS: 100 INJECTION, SOLUTION INTRAVENOUS; SUBCUTANEOUS at 09:28

## 2025-01-01 RX ADMIN — SERTRALINE 50 MG: 50 TABLET, FILM COATED ORAL at 20:45

## 2025-01-01 RX ADMIN — Medication 10 ML: at 20:45

## 2025-01-01 RX ADMIN — INSULIN LISPRO 4 UNITS: 100 INJECTION, SOLUTION INTRAVENOUS; SUBCUTANEOUS at 20:44

## 2025-01-01 RX ADMIN — POTASSIUM CHLORIDE 40 MEQ: 750 CAPSULE, EXTENDED RELEASE ORAL at 12:27

## 2025-01-01 NOTE — PROGRESS NOTES
[facility]   Hospitalist Progress Note  Date: 2025  Patient Name: Jose Shaikh  : 1960  MRN: [id number]  Date of admission: 2024      Subjective   Subjective     Chief Complaint: Follow-up lower extremity swelling    Summary:Jose Shaikh is a 64 y.o. male who presents to the emergency room for worsening lower extremity swelling, shortness of breath, and suicidal ideation. Patient also states that he does not have anybody at home to take care of him. He has chronic leg wounds. He has a history of diabetes atrial fibrillation ulcers. The patient states that he is going to take all his muscle relaxants to kill himself because he cannot go on living like this. He was admitted for further care, psychiatry was consulted. Patient was no longer suicidal after he was admitted to the hospital and bedside sitter was discontinued. He was started on Wellbutrin per psychiatry recommendations. He was diuresed aggressively, King catheter was placed by urology. Wound care following. Can have a voiding trial prior to discharge. Having good urine output with over 25 L of fluid removed since admission. Plan is to go home with home health after discharge when medically ready. Discharge planning coordinating.     Interval Followup: Patient lying in bed appears to be resting fairly comfortably.  Patient continues to endorse some cramping in his right hip.  Lower extremity swelling slightly worse today per nursing.  Afebrile overnight.  Sinus rhythm 70s to 80s on telemetry review.  Blood pressure within normal limits.  Satting well on room air.  Serum potassium low this morning.  Blood sugars above goal this evening.  Hemoglobin low but stable.  Platelets low but stable.    Review of Systems  Constitutional: Negative for fatigue and fever.   HENT: Negative for sore throat and trouble swallowing.    Eyes: Negative for pain and discharge.   Respiratory: Negative for cough and shortness of breath.    Cardiovascular:  Negative for chest pain and palpitations.   Gastrointestinal: Negative for abdominal pain, nausea and vomiting.   Endocrine: Negative for cold intolerance and heat intolerance.   Genitourinary: Negative for difficulty urinating and dysuria.   Musculoskeletal: Negative for back pain and neck stiffness.  Right hip pain  Skin: Negative for color change   Neurological: Negative for syncope and headaches.   Hematological: Negative for adenopathy.   Psychiatric/Behavioral: Negative for confusion and hallucinations.    Objective   Objective     Vitals:   Temp:  [97.7 °F (36.5 °C)-98.2 °F (36.8 °C)] 97.7 °F (36.5 °C)  Heart Rate:  [76-91] 81  Resp:  [16] 16  BP: (113-129)/(46-56) 113/46  Physical Exam   General: well-developed appearing stated age morbidly obese in no acute distress  HEENT: Normocephalic atraumatic moist membranes pupils equal round reactive light, no scleral icterus no conjunctival injection  Cardiovascular: regular rate and rhythm no murmurs rubs or gallops S1-S2, 1+ bilateral lower extremity edema appreciated  Pulmonary: Clear to auscultation bilaterally no wheezes rales or rhonchi symmetric chest expansion, unlabored, no conversational dyspnea appreciated  Gastrointestinal: Soft nontender nondistended positive bowel sounds all 4 quadrants no rebound or guarding  Musculoskeletal: No clubbing cyanosis, warm and well-perfused, calves soft symmetric nontender bilaterally  Skin: Clean dry without rashes  Neuro: Cranial nerves II through XII intact grossly no sensorimotor deficits appreciated bilateral upper and lower extremities  Psych: Patient is calm cooperative and appropriate with exam not responding to internal stimuli  : No King catheter no bladder distention no suprapubic tenderness    Result Review    Result Review:  I have personally reviewed these results and agree with these findings:  [x]  Laboratory  LAB RESULTS:      Lab 01/01/25  0522 12/31/24  0551 12/30/24  0516 12/29/24  0653  12/28/24  0610   WBC 8.16 5.32 6.03 5.63 6.04   HEMOGLOBIN 9.2* 9.6* 9.2* 9.0* 9.2*   HEMATOCRIT 31.7* 34.2* 32.5* 31.7* 32.3*   PLATELETS 139* 145 143 135* 145   NEUTROS ABS 6.23 3.65 4.32 3.89 4.12   IMMATURE GRANS (ABS) 0.04 0.01 0.02 0.02 0.03   LYMPHS ABS 0.86 0.83 0.90 0.85 0.96   MONOS ABS 0.88 0.62 0.64 0.70 0.75   EOS ABS 0.11 0.16 0.10 0.12 0.12   MCV 69.7* 71.1* 70.8* 70.8* 70.8*         Lab 01/01/25  0522 12/31/24  0551 12/30/24  0516 12/29/24  0653 12/28/24  0610   SODIUM 133* 135* 136 134* 136   POTASSIUM 3.4* 3.8 3.7 4.0 3.6   CHLORIDE 96* 99 100 98 99   CO2 28.6 29.4* 26.2 27.9 28.5   ANION GAP 8.4 6.6 9.8 8.1 8.5   BUN 11 10 10 11 11   CREATININE 0.46* 0.49* 0.34* 0.45* 0.48*   EGFR 115.4 113.2 126.4 116.1 113.9   GLUCOSE 205* 136* 147* 203* 144*   CALCIUM 7.9* 8.0* 7.9* 7.9* 8.0*   MAGNESIUM 1.6 1.6 1.6 1.7 2.1   PHOSPHORUS 2.8 3.2 2.8 2.6 2.7                         Brief Urine Lab Results  (Last result in the past 365 days)        Color   Clarity   Blood   Leuk Est   Nitrite   Protein   CREAT   Urine HCG        12/15/24 1907 Dark Yellow   Cloudy   Negative   Negative   Negative   Trace                 Microbiology Results (last 10 days)       ** No results found for the last 240 hours. **            []  Microbiology  []  Radiology  No radiology results for the last 7 days    [x]  EKG/Telemetry   []  Cardiology/Vascular   []  Pathology  []  Old records  [x]  Other:  Scheduled Meds:apixaban, 5 mg, Oral, Q12H  atorvastatin, 10 mg, Oral, Nightly  buPROPion XL, 150 mg, Oral, Daily  digoxin, 125 mcg, Oral, Daily  furosemide, 40 mg, Intravenous, BID Diuretics  insulin glargine, 10 Units, Subcutaneous, Daily  insulin lispro, 2-9 Units, Subcutaneous, 4x Daily AC & at Bedtime  magnesium oxide, 800 mg, Oral, Daily  metoprolol succinate XL, 12.5 mg, Oral, Q24H  mineral oil-hydrophilic petrolatum, 1 Application, Topical, Daily  potassium chloride, 40 mEq, Oral, TID With Meals  senna-docusate sodium, 2 tablet,  Oral, BID  sertraline, 50 mg, Oral, Nightly  sodium chloride, 10 mL, Intravenous, Q12H      Continuous Infusions:   PRN Meds:.•  acetaminophen **OR** acetaminophen **OR** acetaminophen  •  baclofen  •  senna-docusate sodium **AND** polyethylene glycol **AND** bisacodyl **AND** bisacodyl  •  calcium carbonate  •  dextrose  •  dextrose  •  glucagon (human recombinant)  •  melatonin  •  ondansetron ODT **OR** ondansetron  •  oxyCODONE  •  sodium chloride  •  sodium chloride  •  traMADol      Assessment & Plan   Assessment / Plan     Assessment/Plan:  Acute on chronic diastolic congestive heart failure with acute exacerbation  Suicidal ideation  Chronic bilateral foot wounds  Paroxysmal atrial fibrillation  Type 2 diabetes mellitus  Morbid obesity with BMI of 52  Debility with wheelchair dependence  Osteoarthritis of the hip  Chronic venous stasis  Deconditioning  Hypomagnesemia  Buried penis  Hypophosphatemia  Hypokalemia  Hemorrhoids  Anasarca  Urinary retention        Patient admitted for further evaluation and treatment.  Continue fluid restriction  Increase Lasix to 40 mg IV twice daily  Continue strict I's and O's and daily weights  Continue to monitor renal function electrolytes during diuresis  Replace electrolytes as needed  Continue sertraline and bupropion  Continue wound care per nursing  Continue Eliquis twice daily  Continue digoxin daily  Continue metoprolol daily  Continue Lantus daily and titrate as needed  Continue sliding scale insulin  Continue diabetic heart healthy diet  Continue oral analgesics as needed for pain control  Can attempt voiding trial prior to discharge once volume status improves  Continue physical therapy Occupational Therapy         Discussed plan with bedside RN.    Disposition: Home with home health    VTE Prophylaxis:  Pharmacologic & mechanical VTE prophylaxis orders are present.        CODE STATUS:   Level Of Support Discussed With: Patient  Code Status (Patient has no pulse  and is not breathing): CPR (Attempt to Resuscitate)  Medical Interventions (Patient has pulse or is breathing): Full Support

## 2025-01-01 NOTE — PLAN OF CARE
Goal Outcome Evaluation:  Plan of Care Reviewed With: patient        Progress: no change  Outcome Evaluation: Pt complained of pain and muscle spasms. He was medicated per MAR. A one-time dose of flexeril was added because pt reported baclofen wasnt working as well. He refused wound care tonight. RN educated pt on the importance of wound care, pt stated that he understood but still declined. VSS. No needs or issues noted at this time.

## 2025-01-02 LAB
ANION GAP SERPL CALCULATED.3IONS-SCNC: 7.9 MMOL/L (ref 5–15)
BASOPHILS # BLD AUTO: 0.06 10*3/MM3 (ref 0–0.2)
BASOPHILS NFR BLD AUTO: 1.1 % (ref 0–1.5)
BUN SERPL-MCNC: 10 MG/DL (ref 8–23)
BUN/CREAT SERPL: 23.3 (ref 7–25)
CALCIUM SPEC-SCNC: 7.8 MG/DL (ref 8.6–10.5)
CHLORIDE SERPL-SCNC: 98 MMOL/L (ref 98–107)
CO2 SERPL-SCNC: 26.1 MMOL/L (ref 22–29)
CREAT SERPL-MCNC: 0.43 MG/DL (ref 0.76–1.27)
DEPRECATED RDW RBC AUTO: 55.4 FL (ref 37–54)
EGFRCR SERPLBLD CKD-EPI 2021: 117.7 ML/MIN/1.73
EOSINOPHIL # BLD AUTO: 0.06 10*3/MM3 (ref 0–0.4)
EOSINOPHIL NFR BLD AUTO: 1.1 % (ref 0.3–6.2)
ERYTHROCYTE [DISTWIDTH] IN BLOOD BY AUTOMATED COUNT: 22.3 % (ref 12.3–15.4)
GLUCOSE BLDC GLUCOMTR-MCNC: 131 MG/DL (ref 70–99)
GLUCOSE BLDC GLUCOMTR-MCNC: 144 MG/DL (ref 70–99)
GLUCOSE BLDC GLUCOMTR-MCNC: 185 MG/DL (ref 70–99)
GLUCOSE BLDC GLUCOMTR-MCNC: 192 MG/DL (ref 70–99)
GLUCOSE SERPL-MCNC: 132 MG/DL (ref 65–99)
HCT VFR BLD AUTO: 31.4 % (ref 37.5–51)
HGB BLD-MCNC: 8.9 G/DL (ref 13–17.7)
IMM GRANULOCYTES # BLD AUTO: 0.02 10*3/MM3 (ref 0–0.05)
IMM GRANULOCYTES NFR BLD AUTO: 0.4 % (ref 0–0.5)
LYMPHOCYTES # BLD AUTO: 0.5 10*3/MM3 (ref 0.7–3.1)
LYMPHOCYTES NFR BLD AUTO: 8.8 % (ref 19.6–45.3)
MAGNESIUM SERPL-MCNC: 1.5 MG/DL (ref 1.6–2.4)
MCH RBC QN AUTO: 20.2 PG (ref 26.6–33)
MCHC RBC AUTO-ENTMCNC: 28.3 G/DL (ref 31.5–35.7)
MCV RBC AUTO: 71.4 FL (ref 79–97)
MONOCYTES # BLD AUTO: 0.72 10*3/MM3 (ref 0.1–0.9)
MONOCYTES NFR BLD AUTO: 12.7 % (ref 5–12)
NEUTROPHILS NFR BLD AUTO: 4.33 10*3/MM3 (ref 1.7–7)
NEUTROPHILS NFR BLD AUTO: 75.9 % (ref 42.7–76)
NRBC BLD AUTO-RTO: 0 /100 WBC (ref 0–0.2)
PHOSPHATE SERPL-MCNC: 2.4 MG/DL (ref 2.5–4.5)
PLATELET # BLD AUTO: 141 10*3/MM3 (ref 140–450)
PMV BLD AUTO: ABNORMAL FL
POTASSIUM SERPL-SCNC: 3.6 MMOL/L (ref 3.5–5.2)
RBC # BLD AUTO: 4.4 10*6/MM3 (ref 4.14–5.8)
SODIUM SERPL-SCNC: 132 MMOL/L (ref 136–145)
WBC NRBC COR # BLD AUTO: 5.69 10*3/MM3 (ref 3.4–10.8)

## 2025-01-02 PROCEDURE — 83735 ASSAY OF MAGNESIUM: CPT | Performed by: INTERNAL MEDICINE

## 2025-01-02 PROCEDURE — 80048 BASIC METABOLIC PNL TOTAL CA: CPT | Performed by: INTERNAL MEDICINE

## 2025-01-02 PROCEDURE — 63710000001 INSULIN LISPRO (HUMAN) PER 5 UNITS: Performed by: HOSPITALIST

## 2025-01-02 PROCEDURE — 25010000002 FUROSEMIDE PER 20 MG: Performed by: FAMILY MEDICINE

## 2025-01-02 PROCEDURE — 84100 ASSAY OF PHOSPHORUS: CPT | Performed by: INTERNAL MEDICINE

## 2025-01-02 PROCEDURE — 25010000002 ONDANSETRON PER 1 MG: Performed by: HOSPITALIST

## 2025-01-02 PROCEDURE — 82948 REAGENT STRIP/BLOOD GLUCOSE: CPT

## 2025-01-02 PROCEDURE — 63710000001 INSULIN GLARGINE PER 5 UNITS: Performed by: FAMILY MEDICINE

## 2025-01-02 PROCEDURE — 85025 COMPLETE CBC W/AUTO DIFF WBC: CPT | Performed by: INTERNAL MEDICINE

## 2025-01-02 PROCEDURE — 82948 REAGENT STRIP/BLOOD GLUCOSE: CPT | Performed by: HOSPITALIST

## 2025-01-02 PROCEDURE — 99232 SBSQ HOSP IP/OBS MODERATE 35: CPT | Performed by: FAMILY MEDICINE

## 2025-01-02 RX ORDER — POTASSIUM CHLORIDE 750 MG/1
40 CAPSULE, EXTENDED RELEASE ORAL
Status: COMPLETED | OUTPATIENT
Start: 2025-01-02 | End: 2025-01-02

## 2025-01-02 RX ORDER — CYCLOBENZAPRINE HCL 10 MG
10 TABLET ORAL 3 TIMES DAILY PRN
Status: DISCONTINUED | OUTPATIENT
Start: 2025-01-02 | End: 2025-01-16 | Stop reason: HOSPADM

## 2025-01-02 RX ORDER — CYCLOBENZAPRINE HCL 5 MG
5 TABLET ORAL 3 TIMES DAILY PRN
Status: DISCONTINUED | OUTPATIENT
Start: 2025-01-02 | End: 2025-01-02

## 2025-01-02 RX ADMIN — ONDANSETRON 4 MG: 2 INJECTION INTRAMUSCULAR; INTRAVENOUS at 05:35

## 2025-01-02 RX ADMIN — CYCLOBENZAPRINE HYDROCHLORIDE 10 MG: 10 TABLET, FILM COATED ORAL at 21:45

## 2025-01-02 RX ADMIN — Medication 800 MG: at 21:45

## 2025-01-02 RX ADMIN — BUPROPION HYDROCHLORIDE 150 MG: 150 TABLET, EXTENDED RELEASE ORAL at 09:24

## 2025-01-02 RX ADMIN — TRAMADOL HYDROCHLORIDE 100 MG: 50 TABLET, COATED ORAL at 09:24

## 2025-01-02 RX ADMIN — INSULIN LISPRO 2 UNITS: 100 INJECTION, SOLUTION INTRAVENOUS; SUBCUTANEOUS at 17:37

## 2025-01-02 RX ADMIN — APIXABAN 5 MG: 5 TABLET, FILM COATED ORAL at 21:45

## 2025-01-02 RX ADMIN — ATORVASTATIN CALCIUM 10 MG: 10 TABLET, FILM COATED ORAL at 21:45

## 2025-01-02 RX ADMIN — OXYCODONE 5 MG: 5 TABLET ORAL at 17:27

## 2025-01-02 RX ADMIN — SERTRALINE 50 MG: 50 TABLET, FILM COATED ORAL at 21:45

## 2025-01-02 RX ADMIN — POTASSIUM PHOSPHATE, MONOBASIC 500 MG: 500 TABLET, SOLUBLE ORAL at 21:45

## 2025-01-02 RX ADMIN — Medication 10 ML: at 09:25

## 2025-01-02 RX ADMIN — Medication 800 MG: at 09:24

## 2025-01-02 RX ADMIN — POTASSIUM PHOSPHATE, MONOBASIC 500 MG: 500 TABLET, SOLUBLE ORAL at 17:27

## 2025-01-02 RX ADMIN — WHITE PETROLATUM 1 APPLICATION: 1.75 OINTMENT TOPICAL at 13:09

## 2025-01-02 RX ADMIN — APIXABAN 5 MG: 5 TABLET, FILM COATED ORAL at 09:24

## 2025-01-02 RX ADMIN — OXYCODONE 5 MG: 5 TABLET ORAL at 05:07

## 2025-01-02 RX ADMIN — POTASSIUM PHOSPHATE, MONOBASIC 500 MG: 500 TABLET, SOLUBLE ORAL at 13:09

## 2025-01-02 RX ADMIN — CYCLOBENZAPRINE HYDROCHLORIDE 10 MG: 10 TABLET, FILM COATED ORAL at 13:09

## 2025-01-02 RX ADMIN — FUROSEMIDE 40 MG: 10 INJECTION, SOLUTION INTRAMUSCULAR; INTRAVENOUS at 09:25

## 2025-01-02 RX ADMIN — INSULIN GLARGINE 13 UNITS: 100 INJECTION, SOLUTION SUBCUTANEOUS at 09:24

## 2025-01-02 RX ADMIN — FUROSEMIDE 40 MG: 10 INJECTION, SOLUTION INTRAMUSCULAR; INTRAVENOUS at 17:26

## 2025-01-02 RX ADMIN — POTASSIUM CHLORIDE 40 MEQ: 750 CAPSULE, EXTENDED RELEASE ORAL at 09:24

## 2025-01-02 RX ADMIN — DIGOXIN 125 MCG: 250 TABLET ORAL at 13:09

## 2025-01-02 RX ADMIN — POTASSIUM CHLORIDE 40 MEQ: 750 CAPSULE, EXTENDED RELEASE ORAL at 17:26

## 2025-01-02 RX ADMIN — INSULIN LISPRO 2 UNITS: 100 INJECTION, SOLUTION INTRAVENOUS; SUBCUTANEOUS at 21:45

## 2025-01-02 RX ADMIN — POTASSIUM CHLORIDE 40 MEQ: 750 CAPSULE, EXTENDED RELEASE ORAL at 13:09

## 2025-01-02 RX ADMIN — BACLOFEN 20 MG: 10 TABLET ORAL at 05:07

## 2025-01-02 RX ADMIN — Medication 10 ML: at 21:45

## 2025-01-02 NOTE — PLAN OF CARE
Goal Outcome Evaluation:           Progress: no change  Outcome Evaluation: patient alert x4 rested in bed throughout the day with frequent complaints of pain and discomfort. Medications adjusted per provider and given. Wound care and skin care performed per order. Incontinence care performed as patient continues to have multiple stools per day. Continue plan of care.

## 2025-01-02 NOTE — PROGRESS NOTES
[facility]   Hospitalist Progress Note  Date: 2025  Patient Name: Jose Shaikh  : 1960  MRN: [id number]  Date of admission: 2024      Subjective   Subjective     Chief Complaint: Follow-up lower extremity swelling    Summary:Jose Shaikh is a 64 y.o. male who presents to the emergency room for worsening lower extremity swelling, shortness of breath, and suicidal ideation. Patient also states that he does not have anybody at home to take care of him. He has chronic leg wounds. He has a history of diabetes atrial fibrillation ulcers. The patient states that he is going to take all his muscle relaxants to kill himself because he cannot go on living like this. He was admitted for further care, psychiatry was consulted. Patient was no longer suicidal after he was admitted to the hospital and bedside sitter was discontinued. He was started on Wellbutrin per psychiatry recommendations. He was diuresed aggressively, King catheter was placed by urology. Wound care following. Can have a voiding trial prior to discharge. Having good urine output with over 25 L of fluid removed since admission. Plan is to go home with home health after discharge when medically ready. Discharge planning coordinating.     Interval Followup: Patient lying in bed appears to be resting fairly comfortably.  Patient continues to endorse cramping in his right hip.  Requesting to change Robaxin to Flexeril.  Lower extremity swelling slightly improved today.  Afebrile overnight.  Sinus rhythm 70s to 80s on telemetry review.  Blood pressure within normal limits.  Satting well on room air.  Serum potassium low but improved this morning.  Blood sugars at goal this morning.  Hemoglobin trending down.  Platelets improved.  No issues per nursing.    Review of Systems  Constitutional: Negative for fatigue and fever.   HENT: Negative for sore throat and trouble swallowing.    Eyes: Negative for pain and discharge.   Respiratory:  Negative for cough and shortness of breath.    Cardiovascular: Negative for chest pain and palpitations.   Gastrointestinal: Negative for abdominal pain, nausea and vomiting.   Endocrine: Negative for cold intolerance and heat intolerance.   Genitourinary: Negative for difficulty urinating and dysuria.   Musculoskeletal: Negative for back pain and neck stiffness.  Right hip pain  Skin: Negative for color change   Neurological: Negative for syncope and headaches.   Hematological: Negative for adenopathy.   Psychiatric/Behavioral: Negative for confusion and hallucinations.    Objective   Objective     Vitals:   Temp:  [97.3 °F (36.3 °C)-98.4 °F (36.9 °C)] 97.9 °F (36.6 °C)  Heart Rate:  [71-81] 76  Resp:  [16-18] 16  BP: (107-123)/(42-64) 123/48  Physical Exam   General: well-developed appearing stated age morbidly obese in no acute distress  HEENT: Normocephalic atraumatic moist membranes pupils equal round reactive light, no scleral icterus no conjunctival injection  Cardiovascular: regular rate and rhythm no murmurs rubs or gallops S1-S2, 1+ bilateral lower extremity edema appreciated  Pulmonary: Clear to auscultation bilaterally no wheezes rales or rhonchi symmetric chest expansion, unlabored, no conversational dyspnea appreciated  Gastrointestinal: Soft nontender nondistended positive bowel sounds all 4 quadrants no rebound or guarding  Musculoskeletal: No clubbing cyanosis, warm and well-perfused, calves soft symmetric nontender bilaterally  Skin: Clean dry without rashes  Neuro: Cranial nerves II through XII intact grossly no sensorimotor deficits appreciated bilateral upper and lower extremities  Psych: Patient is calm cooperative and appropriate with exam not responding to internal stimuli  : No King catheter no bladder distention no suprapubic tenderness    Result Review    Result Review:  I have personally reviewed these results and agree with these findings:  [x]  Laboratory  LAB RESULTS:      Lab  01/02/25  0614 01/01/25  0522 12/31/24  0551 12/30/24  0516 12/29/24  0653   WBC 5.69 8.16 5.32 6.03 5.63   HEMOGLOBIN 8.9* 9.2* 9.6* 9.2* 9.0*   HEMATOCRIT 31.4* 31.7* 34.2* 32.5* 31.7*   PLATELETS 141 139* 145 143 135*   NEUTROS ABS 4.33 6.23 3.65 4.32 3.89   IMMATURE GRANS (ABS) 0.02 0.04 0.01 0.02 0.02   LYMPHS ABS 0.50* 0.86 0.83 0.90 0.85   MONOS ABS 0.72 0.88 0.62 0.64 0.70   EOS ABS 0.06 0.11 0.16 0.10 0.12   MCV 71.4* 69.7* 71.1* 70.8* 70.8*         Lab 01/02/25  0614 01/01/25  0522 12/31/24  0551 12/30/24  0516 12/29/24  0653   SODIUM 132* 133* 135* 136 134*   POTASSIUM 3.6 3.4* 3.8 3.7 4.0   CHLORIDE 98 96* 99 100 98   CO2 26.1 28.6 29.4* 26.2 27.9   ANION GAP 7.9 8.4 6.6 9.8 8.1   BUN 10 11 10 10 11   CREATININE 0.43* 0.46* 0.49* 0.34* 0.45*   EGFR 117.7 115.4 113.2 126.4 116.1   GLUCOSE 132* 205* 136* 147* 203*   CALCIUM 7.8* 7.9* 8.0* 7.9* 7.9*   MAGNESIUM 1.5* 1.6 1.6 1.6 1.7   PHOSPHORUS 2.4* 2.8 3.2 2.8 2.6                         Brief Urine Lab Results  (Last result in the past 365 days)        Color   Clarity   Blood   Leuk Est   Nitrite   Protein   CREAT   Urine HCG        12/15/24 1907 Dark Yellow   Cloudy   Negative   Negative   Negative   Trace                 Microbiology Results (last 10 days)       ** No results found for the last 240 hours. **            []  Microbiology  []  Radiology  No radiology results for the last 7 days    [x]  EKG/Telemetry   []  Cardiology/Vascular   []  Pathology  []  Old records  [x]  Other:  Scheduled Meds:apixaban, 5 mg, Oral, Q12H  atorvastatin, 10 mg, Oral, Nightly  buPROPion XL, 150 mg, Oral, Daily  digoxin, 125 mcg, Oral, Daily  furosemide, 40 mg, Intravenous, BID Diuretics  insulin glargine, 13 Units, Subcutaneous, Daily  insulin lispro, 2-9 Units, Subcutaneous, 4x Daily AC & at Bedtime  magnesium oxide, 800 mg, Oral, BID  metoprolol succinate XL, 12.5 mg, Oral, Q24H  mineral oil-hydrophilic petrolatum, 1 Application, Topical, Daily  potassium chloride,  40 mEq, Oral, TID With Meals  potassium phosphate (monobasic), 500 mg, Oral, 4x Daily With Meals & Nightly  senna-docusate sodium, 2 tablet, Oral, BID  sertraline, 50 mg, Oral, Nightly  sodium chloride, 10 mL, Intravenous, Q12H      Continuous Infusions:   PRN Meds:.•  acetaminophen **OR** acetaminophen **OR** acetaminophen  •  senna-docusate sodium **AND** polyethylene glycol **AND** bisacodyl **AND** bisacodyl  •  calcium carbonate  •  cyclobenzaprine  •  dextrose  •  dextrose  •  glucagon (human recombinant)  •  melatonin  •  ondansetron ODT **OR** ondansetron  •  oxyCODONE  •  sodium chloride  •  sodium chloride  •  traMADol      Assessment & Plan   Assessment / Plan     Assessment/Plan:  Acute on chronic diastolic congestive heart failure with acute exacerbation  Suicidal ideation  Chronic bilateral foot wounds  Paroxysmal atrial fibrillation  Type 2 diabetes mellitus  Morbid obesity with BMI of 52  Debility with wheelchair dependence  Osteoarthritis of the hip  Chronic venous stasis  Deconditioning  Hypomagnesemia  Buried penis  Hypophosphatemia  Hypokalemia  Hemorrhoids  Anasarca  Urinary retention  Microcytic anemia      Patient admitted for further evaluation and treatment.  Continue fluid restriction  Continue Lasix to 40 mg IV twice daily  Continue strict I's and O's and daily weights  Continue to monitor renal function electrolytes during diuresis  Replace electrolytes as needed  Continue sertraline and bupropion  Continue wound care per nursing  Continue Eliquis twice daily  Continue digoxin daily  Continue metoprolol daily  Continue Lantus daily and titrate as needed  Continue sliding scale insulin  Continue diabetic heart healthy diet  Continue oral analgesics as needed for pain control  Can attempt voiding trial prior to discharge once volume status improves  Patient received iron replacement during current hospitalization  Continue physical therapy Occupational Therapy         Discussed plan with  bedside RN.    Disposition: Home with home health.  Discharge planning coordinating having difficulty finding home health agency    VTE Prophylaxis:  Pharmacologic & mechanical VTE prophylaxis orders are present.        CODE STATUS:   Level Of Support Discussed With: Patient  Code Status (Patient has no pulse and is not breathing): CPR (Attempt to Resuscitate)  Medical Interventions (Patient has pulse or is breathing): Full Support

## 2025-01-02 NOTE — PLAN OF CARE
Goal Outcome Evaluation:  Plan of Care Reviewed With: patient        Progress: no change  Outcome Evaluation: Patient complaining of muscle spasms and pain, refusing turns at times still because of this. Medicated per EMAR and other non-pharamalogical techniques used to help elevate pain. Attending MD made aware. Patients back is signifcantly dry, placed aquaphor on as per orders. Patient is incontient of stool and has asked staff to not clean him up at times, however education and reinforcement was provided on hygiene and importance of care and then the patient was agreeable and cleaned and changed after each time. Peralta cath care preformed mulitple times as patient is incontinent of the stool. Educated furthermore on skin breakdown and needs to be cleaned and turned. Prior to end of shift patient did ask this RN if it could be passed on that he would like to try to sit on the side of the bed tomorrow 1/2/24. Wound care was preformed this shift, however per WOC orders the swelling of his lower extremities did require compression wraps and this was done as previously ordered, night shift RN aware to pass on that WOC needs to be notified as they are out of the office on new years day. MD is also aware of this. VSS     *Also patient had new skin tear from peralta stat lock this shift as when removing because it was soiled with feces a small area of skin peeled. Pic to be uploaded. Cleaned and washed.

## 2025-01-02 NOTE — PLAN OF CARE
Goal Outcome Evaluation:  Plan of Care Reviewed With: patient        Progress: no change  Outcome Evaluation: Pt remains A&Ox4, on room air. Medicated per mar for c/o pain and muscle spasms. Wound care performed on left arm as ordered, other wound care performed yesterday on day shift. Incontinent of bowel this shift, incontinence/skin care provided and linens changed. Pt refused most of turns this shift, educated on importance and allowed to turn a few times. Pt wants to try to sit on side of bed today on dayshift, will pass on. No needs at this time.

## 2025-01-03 LAB
ANION GAP SERPL CALCULATED.3IONS-SCNC: 5.7 MMOL/L (ref 5–15)
BASOPHILS # BLD AUTO: 0.04 10*3/MM3 (ref 0–0.2)
BASOPHILS NFR BLD AUTO: 1 % (ref 0–1.5)
BUN SERPL-MCNC: 10 MG/DL (ref 8–23)
BUN/CREAT SERPL: 20.4 (ref 7–25)
CALCIUM SPEC-SCNC: 8.1 MG/DL (ref 8.6–10.5)
CHLORIDE SERPL-SCNC: 96 MMOL/L (ref 98–107)
CO2 SERPL-SCNC: 30.3 MMOL/L (ref 22–29)
CREAT SERPL-MCNC: 0.49 MG/DL (ref 0.76–1.27)
DACRYOCYTES BLD QL SMEAR: NORMAL
DEPRECATED RDW RBC AUTO: 55.6 FL (ref 37–54)
EGFRCR SERPLBLD CKD-EPI 2021: 113.2 ML/MIN/1.73
EOSINOPHIL # BLD AUTO: 0.12 10*3/MM3 (ref 0–0.4)
EOSINOPHIL NFR BLD AUTO: 2.9 % (ref 0.3–6.2)
ERYTHROCYTE [DISTWIDTH] IN BLOOD BY AUTOMATED COUNT: 22.3 % (ref 12.3–15.4)
GLUCOSE BLDC GLUCOMTR-MCNC: 133 MG/DL (ref 70–99)
GLUCOSE BLDC GLUCOMTR-MCNC: 135 MG/DL (ref 70–99)
GLUCOSE BLDC GLUCOMTR-MCNC: 175 MG/DL (ref 70–99)
GLUCOSE BLDC GLUCOMTR-MCNC: 216 MG/DL (ref 70–99)
GLUCOSE SERPL-MCNC: 141 MG/DL (ref 65–99)
HCT VFR BLD AUTO: 33.4 % (ref 37.5–51)
HGB BLD-MCNC: 9.6 G/DL (ref 13–17.7)
HYPOCHROMIA BLD QL: NORMAL
IMM GRANULOCYTES # BLD AUTO: 0 10*3/MM3 (ref 0–0.05)
IMM GRANULOCYTES NFR BLD AUTO: 0 % (ref 0–0.5)
LARGE PLATELETS: NORMAL
LYMPHOCYTES # BLD AUTO: 0.7 10*3/MM3 (ref 0.7–3.1)
LYMPHOCYTES NFR BLD AUTO: 17.2 % (ref 19.6–45.3)
MAGNESIUM SERPL-MCNC: 1.8 MG/DL (ref 1.6–2.4)
MCH RBC QN AUTO: 20.4 PG (ref 26.6–33)
MCHC RBC AUTO-ENTMCNC: 28.7 G/DL (ref 31.5–35.7)
MCV RBC AUTO: 71.1 FL (ref 79–97)
MICROCYTES BLD QL: NORMAL
MONOCYTES # BLD AUTO: 0.72 10*3/MM3 (ref 0.1–0.9)
MONOCYTES NFR BLD AUTO: 17.7 % (ref 5–12)
NEUTROPHILS NFR BLD AUTO: 2.49 10*3/MM3 (ref 1.7–7)
NEUTROPHILS NFR BLD AUTO: 61.2 % (ref 42.7–76)
NRBC BLD AUTO-RTO: 0 /100 WBC (ref 0–0.2)
OVALOCYTES BLD QL SMEAR: NORMAL
PHOSPHATE SERPL-MCNC: 2.6 MG/DL (ref 2.5–4.5)
PLATELET # BLD AUTO: 137 10*3/MM3 (ref 140–450)
PMV BLD AUTO: ABNORMAL FL
POTASSIUM SERPL-SCNC: 4.3 MMOL/L (ref 3.5–5.2)
RBC # BLD AUTO: 4.7 10*6/MM3 (ref 4.14–5.8)
SMALL PLATELETS BLD QL SMEAR: NORMAL
SODIUM SERPL-SCNC: 132 MMOL/L (ref 136–145)
WBC MORPH BLD: NORMAL
WBC NRBC COR # BLD AUTO: 4.07 10*3/MM3 (ref 3.4–10.8)

## 2025-01-03 PROCEDURE — 80048 BASIC METABOLIC PNL TOTAL CA: CPT | Performed by: INTERNAL MEDICINE

## 2025-01-03 PROCEDURE — 63710000001 ONDANSETRON ODT 4 MG TABLET DISPERSIBLE: Performed by: HOSPITALIST

## 2025-01-03 PROCEDURE — 82948 REAGENT STRIP/BLOOD GLUCOSE: CPT

## 2025-01-03 PROCEDURE — 82948 REAGENT STRIP/BLOOD GLUCOSE: CPT | Performed by: HOSPITALIST

## 2025-01-03 PROCEDURE — 99233 SBSQ HOSP IP/OBS HIGH 50: CPT | Performed by: INTERNAL MEDICINE

## 2025-01-03 PROCEDURE — 85007 BL SMEAR W/DIFF WBC COUNT: CPT | Performed by: INTERNAL MEDICINE

## 2025-01-03 PROCEDURE — 85025 COMPLETE CBC W/AUTO DIFF WBC: CPT | Performed by: INTERNAL MEDICINE

## 2025-01-03 PROCEDURE — 84100 ASSAY OF PHOSPHORUS: CPT | Performed by: INTERNAL MEDICINE

## 2025-01-03 PROCEDURE — 63710000001 INSULIN GLARGINE PER 5 UNITS: Performed by: FAMILY MEDICINE

## 2025-01-03 PROCEDURE — 25010000002 FUROSEMIDE PER 20 MG: Performed by: FAMILY MEDICINE

## 2025-01-03 PROCEDURE — 63710000001 INSULIN LISPRO (HUMAN) PER 5 UNITS: Performed by: HOSPITALIST

## 2025-01-03 PROCEDURE — 83735 ASSAY OF MAGNESIUM: CPT | Performed by: INTERNAL MEDICINE

## 2025-01-03 PROCEDURE — 25010000002 ACETAZOLAMIDE PER 500 MG: Performed by: INTERNAL MEDICINE

## 2025-01-03 RX ORDER — LOPERAMIDE HYDROCHLORIDE 2 MG/1
4 CAPSULE ORAL 3 TIMES DAILY PRN
Status: DISCONTINUED | OUTPATIENT
Start: 2025-01-03 | End: 2025-01-16 | Stop reason: HOSPADM

## 2025-01-03 RX ORDER — TRAMADOL HYDROCHLORIDE 50 MG/1
100 TABLET ORAL EVERY 6 HOURS PRN
Status: DISCONTINUED | OUTPATIENT
Start: 2025-01-03 | End: 2025-01-14

## 2025-01-03 RX ADMIN — SERTRALINE 50 MG: 50 TABLET, FILM COATED ORAL at 20:42

## 2025-01-03 RX ADMIN — Medication 10 ML: at 20:41

## 2025-01-03 RX ADMIN — CYCLOBENZAPRINE HYDROCHLORIDE 10 MG: 10 TABLET, FILM COATED ORAL at 09:13

## 2025-01-03 RX ADMIN — WATER 500 MG: 1 INJECTION INTRAMUSCULAR; INTRAVENOUS; SUBCUTANEOUS at 11:51

## 2025-01-03 RX ADMIN — INSULIN GLARGINE 13 UNITS: 100 INJECTION, SOLUTION SUBCUTANEOUS at 09:15

## 2025-01-03 RX ADMIN — TRAMADOL HYDROCHLORIDE 100 MG: 50 TABLET, COATED ORAL at 00:30

## 2025-01-03 RX ADMIN — OXYCODONE 5 MG: 5 TABLET ORAL at 20:42

## 2025-01-03 RX ADMIN — BUPROPION HYDROCHLORIDE 150 MG: 150 TABLET, EXTENDED RELEASE ORAL at 09:14

## 2025-01-03 RX ADMIN — Medication 10 ML: at 09:15

## 2025-01-03 RX ADMIN — FUROSEMIDE 40 MG: 10 INJECTION, SOLUTION INTRAMUSCULAR; INTRAVENOUS at 17:20

## 2025-01-03 RX ADMIN — OXYCODONE 5 MG: 5 TABLET ORAL at 03:33

## 2025-01-03 RX ADMIN — WHITE PETROLATUM 1 APPLICATION: 1.75 OINTMENT TOPICAL at 09:15

## 2025-01-03 RX ADMIN — FUROSEMIDE 40 MG: 10 INJECTION, SOLUTION INTRAMUSCULAR; INTRAVENOUS at 09:13

## 2025-01-03 RX ADMIN — LOPERAMIDE HYDROCHLORIDE 4 MG: 2 CAPSULE ORAL at 03:57

## 2025-01-03 RX ADMIN — TRAMADOL HYDROCHLORIDE 100 MG: 50 TABLET, COATED ORAL at 09:14

## 2025-01-03 RX ADMIN — OXYCODONE 5 MG: 5 TABLET ORAL at 11:51

## 2025-01-03 RX ADMIN — CYCLOBENZAPRINE HYDROCHLORIDE 10 MG: 10 TABLET, FILM COATED ORAL at 20:42

## 2025-01-03 RX ADMIN — ONDANSETRON 4 MG: 4 TABLET, ORALLY DISINTEGRATING ORAL at 23:37

## 2025-01-03 RX ADMIN — INSULIN LISPRO 4 UNITS: 100 INJECTION, SOLUTION INTRAVENOUS; SUBCUTANEOUS at 20:42

## 2025-01-03 RX ADMIN — APIXABAN 5 MG: 5 TABLET, FILM COATED ORAL at 20:42

## 2025-01-03 RX ADMIN — APIXABAN 5 MG: 5 TABLET, FILM COATED ORAL at 09:14

## 2025-01-03 RX ADMIN — TRAMADOL HYDROCHLORIDE 100 MG: 50 TABLET, COATED ORAL at 17:20

## 2025-01-03 RX ADMIN — DIGOXIN 125 MCG: 250 TABLET ORAL at 11:51

## 2025-01-03 RX ADMIN — Medication 800 MG: at 09:14

## 2025-01-03 RX ADMIN — Medication 800 MG: at 20:42

## 2025-01-03 RX ADMIN — ATORVASTATIN CALCIUM 10 MG: 10 TABLET, FILM COATED ORAL at 20:41

## 2025-01-03 RX ADMIN — INSULIN LISPRO 2 UNITS: 100 INJECTION, SOLUTION INTRAVENOUS; SUBCUTANEOUS at 18:21

## 2025-01-03 NOTE — PLAN OF CARE
"Goal Outcome Evaluation:           Progress: improving  Outcome Evaluation: Pt AOx4 throughout the shift, on room air, and frequent turns/repositioning conducted, pt did not refuse turns this shift. Pt complaint of pain and \"spasms\" a few times this shift, medicated per MAR. Pt frequent bowel movements, PRN imodium ordered. Skin and wound care provided as needed/per orders. Pericare performed freuqently. King catheter patency maintained. Pt still refusing daily weights, educated on reasoning for daily weight. VSS, no acute changes noted this shift. Pt appears to be in no apparent distress at this time, denies any current needs, and has call light within reach.                             "

## 2025-01-03 NOTE — CONSULTS
"Nutrition Services    Patient Name: Jose Shaikh  YOB: 1960  MRN: [id number]  Admission date: 12/18/2024      CLINICAL NUTRITION ASSESSMENT      Reason for Assessment  Follow Up     H&P:  Past Medical History:   Diagnosis Date    Absence of toe of right foot     Acute osteomyelitis of left calcaneus  08/18/2021    Anxiety and depression     Arthritis     Cancer     Chronic pain     STATES HIS PAIN IS 10/10 AAT    Claustrophobia     Corns and callus     Diabetic ulcer of left heel associated with type 2 DM 08/18/2021    Diabetic ulcer of left heel associated with type 2 DM 07/06/2021    Diabetic ulcer of right midfoot associated with type 2 DM 08/18/2021    Difficulty walking     Essential hypertension 08/31/2021    Hammertoe     Hyperlipidemia LDL goal <100 08/31/2021    Ingrown toenail     Obesity     Paroxysmal atrial fibrillation 08/31/2021    Polyneuropathy     Pressure ulcer, stage 1     Tinea unguium     Type 2 diabetes mellitus with polyneuropathy         Current Problems:   Active Hospital Problems    Diagnosis     **CHF exacerbation         Nutrition/Diet History         Narrative   Pt continues to have a good appetite (~75% documented intake). Tolerating diet without GI complaints noted. Double protein provided at meals, as pt does not like supplements. Awaiting discharge. Continue nutrition interventions and RD to follow per protocol.      Anthropometrics        Current Height, Weight Height: 188 cm (74\")  Weight:  (pt refused, educated on reasoning for daily weight)   Current BMI Body mass index is 52.87 kg/m².   BMI Classification Obese Class III   % % (82.2 kg)    Adjusted Body Weight (ABW) 124.1 kg   Weight Hx  Wt Readings from Last 30 Encounters:   12/19/24 0202 (!) 187 kg (411 lb 13.1 oz)   12/18/24 1455 (!) 195 kg (430 lb 12.5 oz)   12/15/24 1912 (!) 199 kg (438 lb 11.5 oz)   12/15/24 1846 (!) 199 kg (439 lb 6 oz)   12/12/24 0730 (!) 175 kg (384 lb 14.8 oz)   12/06/24 2226 " (!) 177 kg (389 lb 12.4 oz)   11/30/24 1436 (!) 174 kg (383 lb 9.6 oz)   11/25/24 1600 (!) 166 kg (365 lb 15.4 oz)   11/19/24 1534 (!) 167 kg (369 lb)   11/08/24 1527 (!) 168 kg (369 lb 11.4 oz)   10/26/24 0615 (!) 167 kg (368 lb 2.7 oz)   10/26/24 0609 (!) 155 kg (341 lb 11.4 oz)   10/09/24 0950 (!) 155 kg (341 lb 7.9 oz)   10/02/24 0805 (!) 150 kg (330 lb 0.5 oz)   08/05/24 0732 (!) 158 kg (348 lb 15.8 oz)   07/22/24 0800 (!) 156 kg (344 lb 9.3 oz)   07/08/24 0900 (!) 156 kg (343 lb 14.7 oz)   06/27/24 0745 (!) 156 kg (343 lb 4.1 oz)   05/31/24 1400 (!) 151 kg (333 lb 12.4 oz)   05/22/24 1000 (!) 157 kg (346 lb 2 oz)   05/15/24 0545 (!) 153 kg (338 lb 3 oz)   05/08/24 1100 (!) 154 kg (340 lb 6.2 oz)   05/08/24 1029 (!) 154 kg (340 lb 6.2 oz)   05/01/24 1100 (!) 156 kg (343 lb 14.7 oz)   04/24/24 0900 (!) 159 kg (350 lb 1.5 oz)   04/17/24 1124 (!) 161 kg (355 lb 9.6 oz)   04/11/24 1509 (!) 162 kg (356 lb 11.3 oz)   04/02/24 1118 (!) 157 kg (345 lb 0.3 oz)   03/26/24 1003 (!) 143 kg (314 lb 2.5 oz)   03/18/24 1323 (!) 151 kg (332 lb 3.7 oz)   03/18/24 0500 (!) 171 kg (378 lb 1.4 oz)   03/17/24 0500 (!) 173 kg (380 lb 15.3 oz)   03/16/24 0500 (!) 171 kg (376 lb 15.8 oz)   03/15/24 0500 (!) 161 kg (354 lb 8 oz)   03/14/24 0300 (!) 173 kg (382 lb 4.4 oz)   03/12/24 0500 (!) 158 kg (347 lb 14.2 oz)   03/11/24 0500 (!) 153 kg (337 lb 8.4 oz)   03/10/24 0540 (!) 154 kg (339 lb 4.6 oz)   03/09/24 0500 (!) 153 kg (337 lb 1.3 oz)   03/08/24 1323 (!) 153 kg (337 lb 8 oz)   03/08/24 0500 (!) 157 kg (346 lb 2 oz)   03/06/24 2300 (!) 155 kg (342 lb 2.5 oz)   03/05/24 0415 (!) 155 kg (342 lb 13 oz)   03/04/24 0500 (!) 156 kg (344 lb 9.3 oz)   03/03/24 0500 (!) 156 kg (344 lb 9.3 oz)   03/02/24 0530 (!) 157 kg (346 lb 12.5 oz)   03/01/24 0500 (!) 157 kg (345 lb 3.9 oz)   02/29/24 0500 (!) 157 kg (347 lb 3.6 oz)   02/28/24 0509 (!) 158 kg (347 lb 14.2 oz)   02/27/24 0500 (!) 159 kg (351 lb 3.1 oz)   02/26/24 0500 (!) 159 kg (350 lb  12 oz)   02/25/24 0500 (!) 160 kg (351 lb 10.1 oz)   02/24/24 0500 (!) 160 kg (352 lb 1.2 oz)   02/23/24 0500 (!) 160 kg (353 lb 6.4 oz)   02/22/24 0500 (!) 160 kg (353 lb 13.4 oz)   02/21/24 0514 (!) 162 kg (356 lb 7.7 oz)   02/20/24 0500 (!) 161 kg (355 lb 2.6 oz)   02/19/24 0300 (!) 160 kg (353 lb 6.4 oz)   02/18/24 0500 (!) 162 kg (356 lb 7.7 oz)   02/17/24 0500 (!) 162 kg (357 lb 2.3 oz)   02/16/24 0500 (!) 162 kg (358 lb 0.4 oz)   02/15/24 0532 (!) 163 kg (360 lb 3.7 oz)   02/14/24 0600 (!) 162 kg (357 lb 5.9 oz)   02/13/24 0500 (!) 162 kg (357 lb 9.4 oz)   02/12/24 1352 (!) 160 kg (352 lb 4.7 oz)   02/12/24 0500 (!) 166 kg (367 lb 1.1 oz)   02/11/24 0500 (!) 169 kg (372 lb 2.2 oz)   02/10/24 0500 (!) 168 kg (370 lb 9.5 oz)   02/09/24 0600 (!) 168 kg (370 lb 9.5 oz)   02/08/24 0600 (!) 165 kg (363 lb 15.7 oz)   02/07/24 0600 (!) 166 kg (366 lb 10 oz)   02/06/24 0419 (!) 166 kg (366 lb 10 oz)   02/05/24 0500 (!) 167 kg (367 lb 4.6 oz)   02/04/24 0500 (!) 167 kg (367 lb 8.1 oz)   02/03/24 0500 (!) 166 kg (366 lb 6.5 oz)   02/02/24 0500 (!) 168 kg (369 lb 14.9 oz)   02/01/24 0613 (!) 169 kg (372 lb 5.7 oz)   01/31/24 0500 (!) 168 kg (371 lb 4.1 oz)   01/30/24 0500 (!) 169 kg (372 lb 12.8 oz)   01/29/24 0232 (!) 169 kg (372 lb 5.7 oz)   01/28/24 0500 (!) 167 kg (368 lb 6.2 oz)   01/26/24 0400 (!) 169 kg (372 lb 2.2 oz)   01/25/24 0417 (!) 167 kg (368 lb 9.8 oz)   01/24/24 0608 (!) 168 kg (371 lb 7.6 oz)   01/23/24 0500 (!) 168 kg (369 lb 14.9 oz)   01/22/24 0500 (!) 168 kg (371 lb 4.1 oz)   01/21/24 0500 (!) 168 kg (371 lb 4.1 oz)   01/20/24 0500 (!) 168 kg (371 lb 7.6 oz)   01/19/24 0500 (!) 171 kg (376 lb 1.7 oz)   01/18/24 0500 (!) 172 kg (380 lb 1.2 oz)   01/17/24 0614 (!) 172 kg (379 lb 6.6 oz)   01/16/24 0500 (!) 171 kg (378 lb 1.4 oz)   01/15/24 0500 (!) 169 kg (372 lb 2.2 oz)   01/14/24 0546 (!) 169 kg (373 lb 7.4 oz)   01/13/24 0507 (!) 171 kg (376 lb 5.2 oz)   01/12/24 0600 (!) 170 kg (374 lb 12.5 oz)    01/11/24 0600 (!) 169 kg (371 lb 14.7 oz)   01/10/24 0600 (!) 169 kg (371 lb 11.1 oz)   01/09/24 0500 (!) 168 kg (370 lb 9.5 oz)   01/08/24 0448 (!) 168 kg (370 lb 9.5 oz)   01/07/24 0454 (!) 167 kg (367 lb 15.2 oz)   01/06/24 0500 (!) 166 kg (366 lb 10 oz)   01/05/24 0555 (!) 165 kg (364 lb 6.7 oz)   01/04/24 0500 (!) 165 kg (363 lb 12.1 oz)   01/03/24 0500 (!) 166 kg (365 lb 1.3 oz)   01/02/24 0500 (!) 167 kg (367 lb 4.6 oz)   01/01/24 0500 (!) 166 kg (365 lb 11.9 oz)   12/31/23 0500 (!) 160 kg (352 lb 4.7 oz)   12/30/23 0500 (!) 167 kg (367 lb 15.2 oz)   12/29/23 0500 (!) 166 kg (366 lb 10 oz)   12/28/23 0500 (!) 165 kg (364 lb 13.8 oz)   12/27/23 0500 (!) 166 kg (367 lb 1.1 oz)   12/26/23 0500 (!) 167 kg (367 lb 4.6 oz)   12/25/23 0500 (!) 165 kg (364 lb 3.2 oz)   12/24/23 0500 (!) 164 kg (362 lb 7 oz)   12/23/23 0500 (!) 163 kg (360 lb 0.2 oz)   12/22/23 0600 (!) 163 kg (358 lb 14.5 oz)   12/21/23 0500 (!) 163 kg (359 lb 12.7 oz)   12/20/23 0500 (!) 162 kg (357 lb 9.4 oz)   12/19/23 0550 (!) 163 kg (359 lb 5.6 oz)   12/18/23 0500 (!) 161 kg (355 lb 13.2 oz)   12/17/23 0500 (!) 160 kg (353 lb 13.4 oz)   12/16/23 1541 (!) 160 kg (353 lb 3.2 oz)   12/16/23 0500 (!) 165 kg (364 lb 13.8 oz)   12/15/23 0500 (!) 165 kg (362 lb 10.5 oz)   12/14/23 0500 (!) 157 kg (345 lb 14.4 oz)   12/13/23 0500 (!) 153 kg (337 lb 4.9 oz)   12/12/23 0500 (!) 155 kg (341 lb 0.8 oz)   12/11/23 1049 (!) 155 kg (341 lb 0.8 oz)   12/11/23 0500 (!) 160 kg (353 lb 13.4 oz)   12/10/23 0532 (!) 162 kg (356 lb 7.7 oz)   12/09/23 0500 (!) 151 kg (332 lb 10.8 oz)   12/08/23 0500 (!) 153 kg (336 lb 13.8 oz)   12/06/23 0500 (!) 154 kg (340 lb 6.2 oz)   12/05/23 0607 (!) 161 kg (354 lb 15.1 oz)   12/04/23 0500 (!) 161 kg (354 lb 4.5 oz)   12/03/23 0400 (!) 161 kg (354 lb 11.5 oz)   11/21/23 1155 (!) 162 kg (357 lb 12.8 oz)   11/09/23 0500 (!) 160 kg (353 lb 9.9 oz)   11/06/23 1422 (!) 158 kg (348 lb 5.2 oz)   11/02/23 1155 (!) 158 kg (348 lb 15.8  oz)   09/11/23 0030 (!) 151 kg (334 lb)   09/10/23 1844 (!) 154 kg (338 lb 10 oz)   08/30/23 1112 (!) 157 kg (347 lb)   08/01/23 0932 (!) 158 kg (347 lb 10.7 oz)   07/31/23 2347 (!) 163 kg (358 lb 7.5 oz)   06/16/23 2333 (!) 155 kg (342 lb 2.5 oz)   06/16/23 1053 (!) 155 kg (342 lb 3.2 oz)   06/15/23 0505 (!) 149 kg (328 lb 14.4 oz)   06/14/23 0455 (!) 149 kg (328 lb 4.8 oz)   06/13/23 1703 (!) 149 kg (328 lb 11.2 oz)   06/13/23 0600 (!) 170 kg (374 lb 12.5 oz)   06/12/23 0420 (!) 160 kg (352 lb 11.8 oz)   06/11/23 0447 (!) 150 kg (331 lb 5.6 oz)   06/10/23 0500 (!) 150 kg (330 lb 14.6 oz)   06/09/23 0536 (!) 156 kg (343 lb 7.6 oz)   06/08/23 1826 (!) 156 kg (344 lb 11.2 oz)   06/08/23 0522 (!) 158 kg (348 lb 8.8 oz)   06/07/23 0548 (!) 155 kg (342 lb 9.5 oz)   06/06/23 0515 (!) 155 kg (341 lb 14.9 oz)   06/05/23 0445 (!) 155 kg (341 lb 9.6 oz)   06/05/23 0348 (!) 158 kg (349 lb 3.3 oz)   06/04/23 0555 (!) 157 kg (346 lb 3.2 oz)   06/03/23 0539 (!) 158 kg (349 lb)   06/02/23 0548 (!) 163 kg (359 lb 3.2 oz)   06/01/23 0600 (!) 164 kg (362 lb)   05/31/23 0507 (!) 164 kg (362 lb 4.8 oz)   05/30/23 0635 (!) 164 kg (362 lb 7 oz)   05/29/23 0500 (!) 167 kg (368 lb 2.7 oz)   05/28/23 0600 (!) 167 kg (367 lb 11.6 oz)   05/27/23 0600 (!) 167 kg (369 lb 0.8 oz)   05/26/23 0529 (!) 167 kg (369 lb 3.2 oz)   05/25/23 0600 (!) 168 kg (370 lb 3.2 oz)   05/24/23 0600 (!) 166 kg (366 lb 9.6 oz)   05/22/23 0529 (!) 169 kg (373 lb 7.4 oz)   05/21/23 0600 (!) 169 kg (371 lb 12.8 oz)   05/20/23 0600 (!) 171 kg (376 lb 5.2 oz)   05/19/23 0300 (!) 168 kg (370 lb 14.4 oz)   05/18/23 1912 (!) 168 kg (371 lb 7.6 oz)   05/18/23 0600 (!) 169 kg (371 lb 11.1 oz)   05/16/23 0700 (!) 171 kg (377 lb 4.8 oz)   05/14/23 0500 (!) 171 kg (377 lb 3.3 oz)   05/12/23 1143 (!) 170 kg (375 lb)   05/06/23 0258 (!) 170 kg (375 lb 8 oz)   04/19/23 0909 (!) 163 kg (359 lb)   04/03/23 1906 (!) 168 kg (370 lb)   03/27/23 0938 (!) 170 kg (373 lb 10.9 oz)    03/17/23 1153 (!) 168 kg (370 lb)   01/27/23 1501 (!) 168 kg (370 lb)   12/22/22 1501 (!) 171 kg (376 lb)   11/08/22 1035 (!) 161 kg (355 lb)   10/01/22 1141 (!) 164 kg (360 lb 10.8 oz)   05/18/22 1311 (!) 155 kg (341 lb)   03/24/22 1432 (!) 155 kg (341 lb)   03/02/22 1412 (!) 155 kg (341 lb)   01/12/22 1317 (!) 155 kg (341 lb)          Wt Change Observation      Estimated/Assessed Needs  Estimated Needs based on: Ideal Body Weight       Energy Requirements 25-30 kcal/kg   EST Needs (kcal/day) 8117-7996 kcal        Protein Requirements 1.2-1.5 g.kg   EST Daily Needs (g/day)  g       Fluid Requirements 25-30 ml/kg    Estimated Needs (mL/day) 0462-5498 ml      Labs/Medications         Pertinent Labs Reviewed.   Results from last 7 days   Lab Units 01/03/25  0548 01/02/25  0614 01/01/25  0522   SODIUM mmol/L 132* 132* 133*   POTASSIUM mmol/L 4.3 3.6 3.4*   CHLORIDE mmol/L 96* 98 96*   CO2 mmol/L 30.3* 26.1 28.6   BUN mg/dL 10 10 11   CREATININE mg/dL 0.49* 0.43* 0.46*   CALCIUM mg/dL 8.1* 7.8* 7.9*   GLUCOSE mg/dL 141* 132* 205*     Results from last 7 days   Lab Units 01/03/25  0548 01/02/25  0614 01/01/25  0522   MAGNESIUM mg/dL 1.8 1.5* 1.6   PHOSPHORUS mg/dL 2.6 2.4* 2.8   HEMOGLOBIN g/dL 9.6* 8.9* 9.2*   HEMATOCRIT % 33.4* 31.4* 31.7*     COVID19   Date Value Ref Range Status   10/14/2024 Detected (C) Not Detected - Ref. Range Final     Lab Results   Component Value Date    HGBA1C 4.40 (L) 08/28/2024         Pertinent Medications Reviewed.     Malnutrition Severity Assessment              Nutrition Diagnosis         Nutrition Dx Problem 1 Overweight/Obesity related to  excessive energy intake  as evidenced by  BMI >50     Nutrition Intervention           Current Nutrition Orders & Evaluation of Intake       Current PO Diet Diet: Cardiac, Diabetic, Fluid Restriction (240 mL/tray); Low Sodium (2g); Consistent Carbohydrate; 2000 mL/day; Fluid Consistency: Thin (IDDSI 0)   Supplement Orders Placed This  Encounter      DIET MESSAGE Double Protein           Nutrition Intervention/Prescription        Continue Cardiac, Diabetic diet as tolerated  Double protein at meals (prevent further skin breakdown)        Medical Nutrition Therapy/Nutrition Education          Learner     Readiness Patient  Acceptance     Method     Response Explanation  Verbalizes understanding     Monitor/Evaluation        Monitor Per protocol, I&O, PO intake, GI status     Nutrition Discharge Plan         To be determined     Electronically signed by:  Terry Maloney RD  01/03/25 15:28 EST

## 2025-01-03 NOTE — PROGRESS NOTES
[facility]   Hospitalist Progress Note  Date: 1/3/2025  Patient Name: Jose Shaikh  : 1960  MRN: [id number]  Date of admission: 2024      Subjective   Subjective     Chief Complaint: Follow-up lower extremity swelling    Summary:Jose Shaikh is a 64 y.o. male who presents to the emergency room for worsening lower extremity swelling, shortness of breath, and suicidal ideation. Patient also states that he does not have anybody at home to take care of him. He has chronic leg wounds. He has a history of diabetes atrial fibrillation ulcers. The patient states that he is going to take all his muscle relaxants to kill himself because he cannot go on living like this. He was admitted for further care, psychiatry was consulted. Patient was no longer suicidal after he was admitted to the hospital and bedside sitter was discontinued. He was started on Wellbutrin per psychiatry recommendations. He was diuresed aggressively, King catheter was placed by urology. Wound care following. Can have a voiding trial prior to discharge. Having good urine output with over 25 L of fluid removed since admission. Plan is to go home with home health after discharge when medically ready. Discharge planning coordinating.     Interval Followup: No acute events overnight.  Still having intermittent muscle spasm especially on his hip.  Urine output has slowed down.    Objective   Objective     Vitals:   Temp:  [97.5 °F (36.4 °C)-99 °F (37.2 °C)] 97.9 °F (36.6 °C)  Heart Rate:  [67-81] 81  Resp:  [16-18] 16  BP: (110-136)/(48-65) 125/65  Physical Exam   Gen: Obese male, chronically ill-appearing, NAD, WDWN  ENT: PERRL, EOMI   CV: RRR no MRG  Pulm: CTAB, no w/r/r  GI: Abd soft, NTND, +bs  Neuro: Moving all extremities spontaneously, CN II-XII grossly intact   Psych: A&O*3, normal mood and affect  Skin: Still with lower extremity swelling but significantly improved, scrotal swelling significantly improved    Result Review    I  have personally reviewed these results and agree with these findings:  [x]  Laboratory  LAB RESULTS: Personally reviewed BMP, CBC, magnesium, phosphorus, blood sugars      Lab 01/03/25  0548 01/02/25  0614 01/01/25  0522 12/31/24  0551 12/30/24  0516   WBC 4.07 5.69 8.16 5.32 6.03   HEMOGLOBIN 9.6* 8.9* 9.2* 9.6* 9.2*   HEMATOCRIT 33.4* 31.4* 31.7* 34.2* 32.5*   PLATELETS 137* 141 139* 145 143   NEUTROS ABS 2.49 4.33 6.23 3.65 4.32   IMMATURE GRANS (ABS) 0.00 0.02 0.04 0.01 0.02   LYMPHS ABS 0.70 0.50* 0.86 0.83 0.90   MONOS ABS 0.72 0.72 0.88 0.62 0.64   EOS ABS 0.12 0.06 0.11 0.16 0.10   MCV 71.1* 71.4* 69.7* 71.1* 70.8*         Lab 01/03/25  0548 01/02/25  0614 01/01/25  0522 12/31/24  0551 12/30/24  0516   SODIUM 132* 132* 133* 135* 136   POTASSIUM 4.3 3.6 3.4* 3.8 3.7   CHLORIDE 96* 98 96* 99 100   CO2 30.3* 26.1 28.6 29.4* 26.2   ANION GAP 5.7 7.9 8.4 6.6 9.8   BUN 10 10 11 10 10   CREATININE 0.49* 0.43* 0.46* 0.49* 0.34*   EGFR 113.2 117.7 115.4 113.2 126.4   GLUCOSE 141* 132* 205* 136* 147*   CALCIUM 8.1* 7.8* 7.9* 8.0* 7.9*   MAGNESIUM 1.8 1.5* 1.6 1.6 1.6   PHOSPHORUS 2.6 2.4* 2.8 3.2 2.8                         Brief Urine Lab Results  (Last result in the past 365 days)        Color   Clarity   Blood   Leuk Est   Nitrite   Protein   CREAT   Urine HCG        12/15/24 1907 Dark Yellow   Cloudy   Negative   Negative   Negative   Trace                 Microbiology Results (last 10 days)       ** No results found for the last 240 hours. **            []  Microbiology  []  Radiology  No radiology results for the last 7 days    [x]  EKG/Telemetry   []  Cardiology/Vascular   []  Pathology  []  Old records  [x]  Other:  Scheduled Meds:apixaban, 5 mg, Oral, Q12H  atorvastatin, 10 mg, Oral, Nightly  buPROPion XL, 150 mg, Oral, Daily  digoxin, 125 mcg, Oral, Daily  furosemide, 40 mg, Intravenous, BID Diuretics  insulin glargine, 13 Units, Subcutaneous, Daily  insulin lispro, 2-9 Units, Subcutaneous, 4x Daily AC & at  Bedtime  magnesium oxide, 800 mg, Oral, BID  metoprolol succinate XL, 12.5 mg, Oral, Q24H  mineral oil-hydrophilic petrolatum, 1 Application, Topical, Daily  senna-docusate sodium, 2 tablet, Oral, BID  sertraline, 50 mg, Oral, Nightly  sodium chloride, 10 mL, Intravenous, Q12H      Continuous Infusions:   PRN Meds:.  acetaminophen **OR** acetaminophen **OR** acetaminophen    senna-docusate sodium **AND** polyethylene glycol **AND** bisacodyl **AND** bisacodyl    calcium carbonate    cyclobenzaprine    dextrose    dextrose    glucagon (human recombinant)    loperamide    melatonin    ondansetron ODT **OR** ondansetron    oxyCODONE    sodium chloride    sodium chloride    traMADol      Assessment & Plan   Assessment / Plan   Acute on chronic diastolic congestive heart failure with acute exacerbation  Suicidal ideation  Chronic bilateral foot wounds  Paroxysmal atrial fibrillation  Type 2 diabetes mellitus  Morbid obesity with BMI of 52  Debility with wheelchair dependence  Osteoarthritis of the hip  Chronic venous stasis  Deconditioning  Hypomagnesemia  Buried penis  Hypophosphatemia  Hypokalemia  Hemorrhoids  Anasarca  Urinary retention  Microcytic anemia    Continue to monitor in the hospital for workup and management of the above  Continue fluid restriction  Continue Lasix 40 mg IV twice daily, dose Diamox 500 mg IV x 1  Trend renal function electrolytes closely while on IV diuretics  Continue strict I's and O's and daily weights  Replace electrolytes as needed  Continue sertraline and bupropion  Continue wound care per nursing  Continue metoprolol, digoxin, Eliquis  Continue Lantus and sliding scale insulin, Blood sugars acceptable  Continue diabetic heart healthy diet  Continue oral analgesics as needed for pain control  Can attempt voiding trial prior to discharge   Patient received iron replacement during current hospitalization  Continue physical therapy Occupational Therapy  Patient will not be able to go to  rehab and apparently there are no home health agencies he will  his case.  He will need to be discharged home when medically ready  Trend renal function and electrolytes with a.m. BMP, magnesium   Trend Hgb and WBC with a.m. CBC    Discussed case with: Bedside RN    VTE Prophylaxis:  Pharmacologic & mechanical VTE prophylaxis orders are present.        CODE STATUS:   Level Of Support Discussed With: Patient  Code Status (Patient has no pulse and is not breathing): CPR (Attempt to Resuscitate)  Medical Interventions (Patient has pulse or is breathing): Full Support

## 2025-01-03 NOTE — SIGNIFICANT NOTE
01/03/25 1250   Coping/Psychosocial   Observed Emotional State calm;cooperative   Verbalized Emotional State relief;hopefulness   Trust Relationship/Rapport empathic listening provided   Involvement in Care interacting with patient   Additional Documentation Spiritual Care (Group)   Spiritual Care   Use of Spiritual Resources non-Yazidism use of spiritual care   Spiritual Care Source  initiative   Spiritual Care Follow-Up follow-up, none required as presently assessed   Response to Spiritual Care receptive of support   Spiritual Care Interventions supportive conversation provided   Spiritual Care Visit Type initial   Receptivity to Spiritual Care visit welcomed     Duration of visit: 10 min

## 2025-01-03 NOTE — PLAN OF CARE
Goal Outcome Evaluation:  Plan of Care Reviewed With: patient        Progress: no change  Outcome Evaluation: Patient alert and oriented throughout shift. Blood glucose monitored. Patient refusing turns this shift, educated patient throughout shift on importance of turning and repositioning. Medicated for pain per MAR. Skin and wound care completed by wound care RN. No new issues at this time

## 2025-01-04 LAB
ANION GAP SERPL CALCULATED.3IONS-SCNC: 7.6 MMOL/L (ref 5–15)
BASOPHILS # BLD AUTO: 0.05 10*3/MM3 (ref 0–0.2)
BASOPHILS NFR BLD AUTO: 0.8 % (ref 0–1.5)
BUN SERPL-MCNC: 10 MG/DL (ref 8–23)
BUN/CREAT SERPL: 23.3 (ref 7–25)
CALCIUM SPEC-SCNC: 8.3 MG/DL (ref 8.6–10.5)
CHLORIDE SERPL-SCNC: 100 MMOL/L (ref 98–107)
CO2 SERPL-SCNC: 26.4 MMOL/L (ref 22–29)
CREAT SERPL-MCNC: 0.43 MG/DL (ref 0.76–1.27)
DEPRECATED RDW RBC AUTO: 54.9 FL (ref 37–54)
EGFRCR SERPLBLD CKD-EPI 2021: 117.7 ML/MIN/1.73
EOSINOPHIL # BLD AUTO: 0.13 10*3/MM3 (ref 0–0.4)
EOSINOPHIL NFR BLD AUTO: 2.2 % (ref 0.3–6.2)
ERYTHROCYTE [DISTWIDTH] IN BLOOD BY AUTOMATED COUNT: 22.4 % (ref 12.3–15.4)
GLUCOSE BLDC GLUCOMTR-MCNC: 106 MG/DL (ref 70–99)
GLUCOSE BLDC GLUCOMTR-MCNC: 132 MG/DL (ref 70–99)
GLUCOSE BLDC GLUCOMTR-MCNC: 150 MG/DL (ref 70–99)
GLUCOSE BLDC GLUCOMTR-MCNC: 172 MG/DL (ref 70–99)
GLUCOSE SERPL-MCNC: 140 MG/DL (ref 65–99)
HCT VFR BLD AUTO: 34.8 % (ref 37.5–51)
HGB BLD-MCNC: 9.9 G/DL (ref 13–17.7)
IMM GRANULOCYTES # BLD AUTO: 0.02 10*3/MM3 (ref 0–0.05)
IMM GRANULOCYTES NFR BLD AUTO: 0.3 % (ref 0–0.5)
LYMPHOCYTES # BLD AUTO: 0.89 10*3/MM3 (ref 0.7–3.1)
LYMPHOCYTES NFR BLD AUTO: 15.1 % (ref 19.6–45.3)
MAGNESIUM SERPL-MCNC: 2 MG/DL (ref 1.6–2.4)
MCH RBC QN AUTO: 20 PG (ref 26.6–33)
MCHC RBC AUTO-ENTMCNC: 28.4 G/DL (ref 31.5–35.7)
MCV RBC AUTO: 70.3 FL (ref 79–97)
MONOCYTES # BLD AUTO: 0.84 10*3/MM3 (ref 0.1–0.9)
MONOCYTES NFR BLD AUTO: 14.2 % (ref 5–12)
NEUTROPHILS NFR BLD AUTO: 3.98 10*3/MM3 (ref 1.7–7)
NEUTROPHILS NFR BLD AUTO: 67.4 % (ref 42.7–76)
NRBC BLD AUTO-RTO: 0 /100 WBC (ref 0–0.2)
PHOSPHATE SERPL-MCNC: 3.3 MG/DL (ref 2.5–4.5)
PLATELET # BLD AUTO: 140 10*3/MM3 (ref 140–450)
PMV BLD AUTO: ABNORMAL FL
POTASSIUM SERPL-SCNC: 4 MMOL/L (ref 3.5–5.2)
RBC # BLD AUTO: 4.95 10*6/MM3 (ref 4.14–5.8)
SODIUM SERPL-SCNC: 134 MMOL/L (ref 136–145)
WBC NRBC COR # BLD AUTO: 5.91 10*3/MM3 (ref 3.4–10.8)

## 2025-01-04 PROCEDURE — 80048 BASIC METABOLIC PNL TOTAL CA: CPT | Performed by: INTERNAL MEDICINE

## 2025-01-04 PROCEDURE — 63710000001 ONDANSETRON ODT 4 MG TABLET DISPERSIBLE: Performed by: HOSPITALIST

## 2025-01-04 PROCEDURE — 25010000002 ACETAZOLAMIDE PER 500 MG: Performed by: INTERNAL MEDICINE

## 2025-01-04 PROCEDURE — 25010000002 FUROSEMIDE PER 20 MG: Performed by: FAMILY MEDICINE

## 2025-01-04 PROCEDURE — 99233 SBSQ HOSP IP/OBS HIGH 50: CPT | Performed by: INTERNAL MEDICINE

## 2025-01-04 PROCEDURE — 82948 REAGENT STRIP/BLOOD GLUCOSE: CPT | Performed by: HOSPITALIST

## 2025-01-04 PROCEDURE — 63710000001 INSULIN LISPRO (HUMAN) PER 5 UNITS: Performed by: HOSPITALIST

## 2025-01-04 PROCEDURE — 85025 COMPLETE CBC W/AUTO DIFF WBC: CPT | Performed by: INTERNAL MEDICINE

## 2025-01-04 PROCEDURE — 84100 ASSAY OF PHOSPHORUS: CPT | Performed by: INTERNAL MEDICINE

## 2025-01-04 PROCEDURE — 63710000001 INSULIN GLARGINE PER 5 UNITS: Performed by: FAMILY MEDICINE

## 2025-01-04 PROCEDURE — 82948 REAGENT STRIP/BLOOD GLUCOSE: CPT

## 2025-01-04 PROCEDURE — 83735 ASSAY OF MAGNESIUM: CPT | Performed by: INTERNAL MEDICINE

## 2025-01-04 RX ADMIN — CYCLOBENZAPRINE HYDROCHLORIDE 10 MG: 10 TABLET, FILM COATED ORAL at 09:43

## 2025-01-04 RX ADMIN — Medication 10 ML: at 21:28

## 2025-01-04 RX ADMIN — TRAMADOL HYDROCHLORIDE 100 MG: 50 TABLET, COATED ORAL at 21:28

## 2025-01-04 RX ADMIN — INSULIN GLARGINE 13 UNITS: 100 INJECTION, SOLUTION SUBCUTANEOUS at 09:33

## 2025-01-04 RX ADMIN — OXYCODONE 5 MG: 5 TABLET ORAL at 21:28

## 2025-01-04 RX ADMIN — INSULIN LISPRO 2 UNITS: 100 INJECTION, SOLUTION INTRAVENOUS; SUBCUTANEOUS at 09:32

## 2025-01-04 RX ADMIN — Medication 10 ML: at 09:35

## 2025-01-04 RX ADMIN — Medication 800 MG: at 21:27

## 2025-01-04 RX ADMIN — APIXABAN 5 MG: 5 TABLET, FILM COATED ORAL at 21:27

## 2025-01-04 RX ADMIN — ATORVASTATIN CALCIUM 10 MG: 10 TABLET, FILM COATED ORAL at 21:28

## 2025-01-04 RX ADMIN — DIGOXIN 125 MCG: 250 TABLET ORAL at 12:07

## 2025-01-04 RX ADMIN — TRAMADOL HYDROCHLORIDE 100 MG: 50 TABLET, COATED ORAL at 14:30

## 2025-01-04 RX ADMIN — Medication 800 MG: at 09:33

## 2025-01-04 RX ADMIN — WATER 500 MG: 1 INJECTION INTRAMUSCULAR; INTRAVENOUS; SUBCUTANEOUS at 09:35

## 2025-01-04 RX ADMIN — FUROSEMIDE 40 MG: 10 INJECTION, SOLUTION INTRAMUSCULAR; INTRAVENOUS at 09:33

## 2025-01-04 RX ADMIN — FUROSEMIDE 40 MG: 10 INJECTION, SOLUTION INTRAMUSCULAR; INTRAVENOUS at 18:33

## 2025-01-04 RX ADMIN — BUPROPION HYDROCHLORIDE 150 MG: 150 TABLET, EXTENDED RELEASE ORAL at 09:33

## 2025-01-04 RX ADMIN — METOPROLOL SUCCINATE 12.5 MG: 25 TABLET, EXTENDED RELEASE ORAL at 09:34

## 2025-01-04 RX ADMIN — SERTRALINE 50 MG: 50 TABLET, FILM COATED ORAL at 21:27

## 2025-01-04 RX ADMIN — ONDANSETRON 4 MG: 4 TABLET, ORALLY DISINTEGRATING ORAL at 09:43

## 2025-01-04 RX ADMIN — APIXABAN 5 MG: 5 TABLET, FILM COATED ORAL at 09:33

## 2025-01-04 RX ADMIN — INSULIN LISPRO 2 UNITS: 100 INJECTION, SOLUTION INTRAVENOUS; SUBCUTANEOUS at 21:27

## 2025-01-04 NOTE — PLAN OF CARE
Goal Outcome Evaluation:  Plan of Care Reviewed With: patient        Progress: no change  Outcome Evaluation: A & O x 4, agitation present at beginning of shift, pain medication given as ordered. Refused to be turned and CHG at start of shift. C/O nausea, medication provided and appears to be resting in bed without signs of discomfort being noted.

## 2025-01-04 NOTE — SIGNIFICANT NOTE
Wound Eval / Progress Noted    KEO Dominguez     Patient Name: Jose Shaikh  : 1960  MRN: [id number]  Today's Date: 1/3/2025                 Admit Date: 2024    Visit Dx:    ICD-10-CM ICD-9-CM   1. Hypervolemia, unspecified hypervolemia type  E87.70 276.69   2. Congestive heart failure, unspecified HF chronicity, unspecified heart failure type  I50.9 428.0   3. Suicidal ideation  R45.851 V62.84         CHF exacerbation        Past Medical History:   Diagnosis Date    Absence of toe of right foot     Acute osteomyelitis of left calcaneus  2021    Anxiety and depression     Arthritis     Cancer     Chronic pain     STATES HIS PAIN IS 10/10 AAT    Claustrophobia     Corns and callus     Diabetic ulcer of left heel associated with type 2 DM 2021    Diabetic ulcer of left heel associated with type 2 DM 2021    Diabetic ulcer of right midfoot associated with type 2 DM 2021    Difficulty walking     Essential hypertension 2021    Hammertoe     Hyperlipidemia LDL goal <100 2021    Ingrown toenail     Obesity     Paroxysmal atrial fibrillation 2021    Polyneuropathy     Pressure ulcer, stage 1     Tinea unguium     Type 2 diabetes mellitus with polyneuropathy         Past Surgical History:   Procedure Laterality Date    CYST REMOVAL      center of back; benign    EYE SURGERY      INCISION AND DRAINAGE ABSCESS      back    INCISION AND DRAINAGE LEG Right 12/10/2021    Procedure: INCISION AND DRAINAGE LOWER EXTREMITY;  Surgeon: Ash Leyva DPM;  Location: [facility] MAIN OR;  Service: Podiatry;  Laterality: Right;    OTHER SURGICAL HISTORY      Surgical clips left foot    TOE SURGERY Right     Removal of 5th toe    TRANS METATARSAL AMPUTATION Right 2021    Procedure: AMPUTATION TRANS METATARSAL;  Surgeon: Ash Leyva DPM;  Location: [facility] MAIN OR;  Service: Podiatry;  Laterality: Right;    VASCULAR SURGERY      WRIST SURGERY Left     repair of  injury         Physical Assessment:  Wound 12/19/24 0259 Right anterior foot (Active)   Wound Image   01/03/25 1006   Dressing Appearance dry;intact 01/03/25 1006   Closure None 01/03/25 1006   Base moist;red;yellow;maroon/purple 01/03/25 1006   Periwound dry;pink 01/03/25 1006   Periwound Temperature warm 01/03/25 1006   Periwound Skin Turgor soft 01/03/25 1006   Edges open 01/03/25 1006   Wound Length (cm) 2.4 cm 01/03/25 1006   Wound Width (cm) 2 cm 01/03/25 1006   Wound Depth (cm) 0.1 cm 01/03/25 1006   Wound Surface Area (cm^2) 4.8 cm^2 01/03/25 1006   Wound Volume (cm^3) 0.48 cm^3 01/03/25 1006   Drainage Characteristics/Odor serosanguineous 01/03/25 1006   Drainage Amount scant 01/03/25 1006   Care, Wound cleansed with;sterile normal saline 01/03/25 1006   Dressing Care dressing removed;dressing applied;silver impregnated;hydrofiber;silicone border foam 01/03/25 1006   Periwound Care absorptive dressing applied 01/03/25 1006       Wound Left proximal arm (Active)   Wound Image   01/03/25 1006   Dressing Appearance dry;intact 01/03/25 1006   Closure None 01/03/25 1006   Base moist;red 01/03/25 1006   Red (%), Wound Tissue Color 100 01/03/25 1006   Periwound dry;pink 01/03/25 1006   Periwound Temperature warm 01/03/25 1006   Periwound Skin Turgor soft 01/03/25 1006   Edges open 01/03/25 1006   Drainage Characteristics/Odor serosanguineous 01/03/25 1006   Drainage Amount scant 01/03/25 1006   Care, Wound cleansed with;sterile normal saline 01/03/25 1006   Dressing Care dressing removed;dressing applied;non-adherent;petroleum-based;gauze;silicone border foam 01/03/25 1006   Periwound Care absorptive dressing applied 01/03/25 1006       Wound 12/30/24 1540 Right lower leg unspecified (Active)   Wound Image   01/03/25 1006   Dressing Appearance dry;intact 01/03/25 1006   Closure None 01/03/25 1006   Base moist;red 01/03/25 1006   Red (%), Wound Tissue Color 100 01/03/25 1006   Periwound dry;pink 01/03/25 1006    Periwound Temperature warm 01/03/25 1006   Periwound Skin Turgor soft 01/03/25 1006   Edges open 01/03/25 1006   Drainage Characteristics/Odor serosanguineous 01/03/25 1006   Drainage Amount scant 01/03/25 1006   Care, Wound cleansed with;sterile normal saline 01/03/25 1006   Dressing Care dressing removed;dressing applied;non-adherent;petroleum-based;gauze;silicone border foam;tubular wrap;elastic bandage 01/03/25 1006   Periwound Care absorptive dressing applied 01/03/25 1006       Wound 12/30/24 1540 Right anterior ankle pressure injury (Active)   Wound Image   01/03/25 1006   Pressure Injury Stage DTPI 01/03/25 1006   Dressing Appearance open to air 01/03/25 1006   Closure None 01/03/25 1006   Base closed/resurfaced;dry;maroon/purple;nonblanchable 01/03/25 1006   Periwound dry 01/03/25 1006   Periwound Temperature warm 01/03/25 1006   Periwound Skin Turgor soft 01/03/25 1006   Edges rolled/closed 01/03/25 1006   Wound Length (cm) 0.7 cm 01/03/25 1006   Wound Width (cm) 0.5 cm 01/03/25 1006   Wound Depth (cm) 0 cm 01/03/25 1006   Wound Surface Area (cm^2) 0.35 cm^2 01/03/25 1006   Wound Volume (cm^3) 0 cm^3 01/03/25 1006   Drainage Amount none 01/03/25 1006   Care, Wound cleansed with;sterile normal saline 01/03/25 1006   Dressing Care dressing applied;non-adherent;petroleum-based;gauze;silicone border foam;tubular wrap;elastic bandage 01/03/25 1006   Periwound Care dry periwound area maintained 01/03/25 1006       Wound 12/30/24 1540 Right posterior ankle pressure injury (Active)   Wound Image   01/03/25 1006   Pressure Injury Stage 2 01/03/25 1006   Dressing Appearance dry;intact 01/03/25 1006   Closure None 01/03/25 1006   Base moist;red 01/03/25 1006   Periwound dry;pink 01/03/25 1006   Periwound Temperature warm 01/03/25 1006   Periwound Skin Turgor soft 01/03/25 1006   Edges open 01/03/25 1006   Wound Length (cm) 1.2 cm 01/03/25 1006   Wound Width (cm) 0.8 cm 01/03/25 1006   Wound Depth (cm) 0.1 cm  01/03/25 1006   Wound Surface Area (cm^2) 0.96 cm^2 01/03/25 1006   Wound Volume (cm^3) 0.096 cm^3 01/03/25 1006   Drainage Characteristics/Odor serosanguineous 01/03/25 1006   Drainage Amount scant 01/03/25 1006   Care, Wound cleansed with;sterile normal saline 01/03/25 1006   Dressing Care dressing removed;dressing applied;silver impregnated;hydrofiber;silicone border foam 01/03/25 1006   Periwound Care absorptive dressing applied 01/03/25 1006       Wound 12/30/24 1540 Right lateral leg unspecified (Active)   Wound Image   01/03/25 1006   Dressing Appearance dry;intact 01/03/25 1006   Closure None 01/03/25 1006   Base red;moist 01/03/25 1006   Red (%), Wound Tissue Color 100 01/03/25 1006   Periwound dry;pink 01/03/25 1006   Periwound Temperature warm 01/03/25 1006   Periwound Skin Turgor soft 01/03/25 1006   Edges open 01/03/25 1006   Drainage Characteristics/Odor serosanguineous 01/03/25 1006   Drainage Amount scant 01/03/25 1006   Care, Wound cleansed with;sterile normal saline 01/03/25 1006   Dressing Care dressing removed;dressing applied;non-adherent;petroleum-based;gauze;silicone border foam;tubular wrap;elastic bandage 01/03/25 1006   Periwound Care absorptive dressing applied 01/03/25 1006       Wound 01/01/25 1800 Left anterior thigh skin tear (Active)   Wound Image   01/03/25 1006   Dressing Appearance open to air 01/03/25 1006   Closure None 01/03/25 1006   Base scab;red;dry 01/03/25 1006   Periwound dry;pink 01/03/25 1006   Periwound Temperature warm 01/03/25 1006   Periwound Skin Turgor soft 01/03/25 1006   Edges open 01/03/25 1006   Drainage Amount none 01/03/25 1006   Care, Wound cleansed with;sterile normal saline 01/03/25 1006   Dressing Care dressing applied;non-adherent;petroleum-based;gauze;silicone border foam 01/03/25 1006   Periwound Care dry periwound area maintained 01/03/25 1006       Wound 01/03/25 1006 Left posterior foot blisters (Active)   Wound Image   01/03/25 1006   Dressing  Appearance open to air 01/03/25 1006   Closure None 01/03/25 1006   Base closed/resurfaced;white;other (see comments) 01/03/25 1006   Periwound dry;redness 01/03/25 1006   Periwound Temperature warm 01/03/25 1006   Periwound Skin Turgor soft 01/03/25 1006   Edges rolled/closed 01/03/25 1006   Drainage Amount none 01/03/25 1006   Care, Wound cleansed with;sterile normal saline 01/03/25 1006   Dressing Care dressing applied;non-adherent;petroleum-based;gauze;silicone border foam 01/03/25 1006   Periwound Care dry periwound area maintained 01/03/25 1006        Wound Check / Follow-up:  Patient seen today for wound follow up. Patient is awake, alert, and oriented. Primary PCA present during assessment and patient allowed a partial bath to be performed. Patient was noted to have been incontinent of stool and a linen change was provided. Patient refuses to have heels elevated off of bed due to pressure and discomfort placed on joints. Patient also refused to have abdominal creases padded for moisture prevention. Educated patient on the risk of tissue breakdown with stagnant pressure to heels and moisture to folds. Encouraged patient to frequently reposition if unable to allow staff to offset with a pillow. Reinforced importance of pressure redistribution. Patient is on a specialty bed.     No areas of open tissue noted to abdominal creases; however, moisture was noted within crease and patient would not allow area to be thoroughly cleaned. Recommending to continue daily skin care with application of corn starch powder to folds.     Increase in edema noted to bilateral lower extremities. Blanchable redness noted to gluteal aspects. Recommending daily skin care with application of Aquaphor to gluteal aspects, legs, feet, scrotum, groin, and elbows once a day. Recommending application of blue top moisture barrier to gluteal aspects, scrotum, and groin three times a day and as needed for incontinence. Recommending to resume  light compression to BLE with gauze roll and elastic bandages from base of toes to bend of knees. Implement every two hour turns and offload at all times. Discussed findings with attending MD, orders obtained.     Ulcerations noted to right anterior and lateral lower leg within area of pink/tan hyperpigmentation.  Deep tissue pressure injury noted to anterior aspect of right ankle. Wound base is closed with non-blanchable maroon/purple coloration. Periwound tissue is dry with pink coloration.  Traumatic injury noted to left anterior thigh. Wound base is covered with dry red/tan tissue. Periwound tissue is dry with pink coloration.  Left plantar foot presents with white fluid filled blisters. Periwound tissue is dry with redness.  Traumatic injury to left lower arm presents with moist red tissue. Periwound tissue is dry with redness/ecchymosis.  Cleansed all areas with normal saline and gauze, blotted dry.  Recommending daily dressing changes with non-adherent, petroleum-based gauze and silicone border dressing securement.    Traumatic injury noted to distal aspect of residual right foot. Wound base presents with moist red/maroon and yellow tissue. Periwound tissue is dry and pink.  Previously noted DTPI to right posterior ankle now presents as a stage 2 pressure injury. Center of wound base presents with pale tissue, possibly early epithelialization. Surrounding wound base presents with moist red tissue. Periwound tissue is dry and pink.  Cleansed all areas with normal saline and gauze, blotted dry.  Recommending daily dressing changes with silver impregnated hydrofiber and silicone border dressing securement.     Impression: Ulcerations to right lower leg. Traumatic injuries to right residual foot, left thigh, and left arm. DTPIs to right anterior ankle. Stage 2 pressure injury to right posterior ankle. Fluid filled blisters to left plantar foot. Evolving BLE edema.     Short term goals:  Regain skin integrity,  skin protection, moisture prevention, pressure reduction, quality skin care and hygiene, daily dressing changes, edema management.    Greer North RN    1/3/2025    19:37 EST

## 2025-01-04 NOTE — PROGRESS NOTES
[facility]   Hospitalist Progress Note  Date: 2025  Patient Name: Jose Shaikh  : 1960  MRN: [id number]  Date of admission: 2024      Subjective   Subjective     Chief Complaint: Follow-up lower extremity swelling    Summary:Jose Shaikh is a 64 y.o. male who presents to the emergency room for worsening lower extremity swelling, shortness of breath, and suicidal ideation. Patient also states that he does not have anybody at home to take care of him. He has chronic leg wounds. He has a history of diabetes atrial fibrillation ulcers. The patient states that he is going to take all his muscle relaxants to kill himself because he cannot go on living like this. He was admitted for further care, psychiatry was consulted. Patient was no longer suicidal after he was admitted to the hospital and bedside sitter was discontinued. He was started on Wellbutrin per psychiatry recommendations. He was diuresed aggressively, King catheter was placed by urology. Wound care following. Can have a voiding trial prior to discharge. Having good urine output with over 25 L of fluid removed since admission. Plan is to go home with home health after discharge when medically ready. Discharge planning coordinating.     Interval Followup: No acute events overnight.  Had a little bit of increased urine output with the Diamox yesterday.  Otherwise no new complaints.    Objective   Objective     Vitals:   Temp:  [97.5 °F (36.4 °C)-98.8 °F (37.1 °C)] 98.8 °F (37.1 °C)  Heart Rate:  [76-85] 76  Resp:  [18] 18  BP: (112-123)/(56-69) 114/63  Physical Exam   Gen: Obese male, NAD, WDWN  ENT: PERRL, EOMI   CV: RRR no MRG  Pulm: CTAB, no w/r/r  GI: Abd soft, NTND, +bs  Neuro: Moving all extremities spontaneously, CN II-XII grossly intact   Psych: A&O*3, normal mood and affect  Skin: Still with lower extremity swelling but significantly improved, lower extremities wrapped    Result Review    I have personally reviewed these  results and agree with these findings:  [x]  Laboratory  LAB RESULTS: Personally reviewed BMP, CBC, magnesium, phosphorus, blood sugars      Lab 01/04/25  0605 01/03/25  0548 01/02/25  0614 01/01/25  0522 12/31/24  0551   WBC 5.91 4.07 5.69 8.16 5.32   HEMOGLOBIN 9.9* 9.6* 8.9* 9.2* 9.6*   HEMATOCRIT 34.8* 33.4* 31.4* 31.7* 34.2*   PLATELETS 140 137* 141 139* 145   NEUTROS ABS 3.98 2.49 4.33 6.23 3.65   IMMATURE GRANS (ABS) 0.02 0.00 0.02 0.04 0.01   LYMPHS ABS 0.89 0.70 0.50* 0.86 0.83   MONOS ABS 0.84 0.72 0.72 0.88 0.62   EOS ABS 0.13 0.12 0.06 0.11 0.16   MCV 70.3* 71.1* 71.4* 69.7* 71.1*         Lab 01/04/25  0605 01/03/25  0548 01/02/25  0614 01/01/25 0522 12/31/24  0551   SODIUM 134* 132* 132* 133* 135*   POTASSIUM 4.0 4.3 3.6 3.4* 3.8   CHLORIDE 100 96* 98 96* 99   CO2 26.4 30.3* 26.1 28.6 29.4*   ANION GAP 7.6 5.7 7.9 8.4 6.6   BUN 10 10 10 11 10   CREATININE 0.43* 0.49* 0.43* 0.46* 0.49*   EGFR 117.7 113.2 117.7 115.4 113.2   GLUCOSE 140* 141* 132* 205* 136*   CALCIUM 8.3* 8.1* 7.8* 7.9* 8.0*   MAGNESIUM 2.0 1.8 1.5* 1.6 1.6   PHOSPHORUS 3.3 2.6 2.4* 2.8 3.2                         Brief Urine Lab Results  (Last result in the past 365 days)        Color   Clarity   Blood   Leuk Est   Nitrite   Protein   CREAT   Urine HCG        12/15/24 1907 Dark Yellow   Cloudy   Negative   Negative   Negative   Trace                 Microbiology Results (last 10 days)       ** No results found for the last 240 hours. **            []  Microbiology  []  Radiology  No radiology results for the last 7 days    [x]  EKG/Telemetry   []  Cardiology/Vascular   []  Pathology  []  Old records  [x]  Other:  Scheduled Meds:apixaban, 5 mg, Oral, Q12H  atorvastatin, 10 mg, Oral, Nightly  buPROPion XL, 150 mg, Oral, Daily  digoxin, 125 mcg, Oral, Daily  furosemide, 40 mg, Intravenous, BID Diuretics  insulin glargine, 13 Units, Subcutaneous, Daily  insulin lispro, 2-9 Units, Subcutaneous, 4x Daily AC & at Bedtime  magnesium oxide, 800  mg, Oral, BID  metoprolol succinate XL, 12.5 mg, Oral, Q24H  mineral oil-hydrophilic petrolatum, 1 Application, Topical, Daily  senna-docusate sodium, 2 tablet, Oral, BID  sertraline, 50 mg, Oral, Nightly  sodium chloride, 10 mL, Intravenous, Q12H      Continuous Infusions:   PRN Meds:.  acetaminophen **OR** acetaminophen **OR** acetaminophen    senna-docusate sodium **AND** polyethylene glycol **AND** bisacodyl **AND** bisacodyl    calcium carbonate    cyclobenzaprine    dextrose    dextrose    glucagon (human recombinant)    loperamide    melatonin    ondansetron ODT **OR** ondansetron    oxyCODONE    sodium chloride    sodium chloride    traMADol      Assessment & Plan   Assessment / Plan   Acute on chronic diastolic congestive heart failure with acute exacerbation  Suicidal ideation  Chronic bilateral foot wounds  Paroxysmal atrial fibrillation  Type 2 diabetes mellitus  Morbid obesity with BMI of 52  Debility with wheelchair dependence  Osteoarthritis of the hip  Chronic venous stasis  Deconditioning  Hypomagnesemia  Buried penis  Hypophosphatemia  Hypokalemia  Hemorrhoids  Anasarca  Urinary retention  Microcytic anemia    Continue to monitor in the hospital for workup and management of the above  Continue fluid restriction  Continue Lasix 40 mg IV twice daily, redose Diamox 500 mg IV x 1  Trend renal function and electrolytes closely while on IV diuretics  Continue strict I's and O's  Repeat weight today  Continue sertraline and bupropion  Continue wound care per nursing  Continue metoprolol, digoxin, Eliquis  Continue Lantus and sliding scale insulin, Blood sugars acceptable  Continue diabetic heart healthy diet  Continue oral analgesics as needed for pain control  Can attempt voiding trial prior to discharge   Patient received iron replacement during current hospitalization  Continue physical therapy Occupational Therapy  Patient will not be able to go to rehab and apparently there are no home health  agencies he will  his case.  He will need to be discharged home when medically ready  Trend renal function and electrolytes with a.m. BMP, magnesium   Trend Hgb and WBC with a.m. CBC    Discussed case with: Bedside RN    VTE Prophylaxis:  Pharmacologic & mechanical VTE prophylaxis orders are present.        CODE STATUS:   Level Of Support Discussed With: Patient  Code Status (Patient has no pulse and is not breathing): CPR (Attempt to Resuscitate)  Medical Interventions (Patient has pulse or is breathing): Full Support

## 2025-01-04 NOTE — PLAN OF CARE
Goal Outcome Evaluation:   Pt remained A&Ox4 this shift. Also, pt remained on room air maintaining stable oxygen saturation. Pt refused wound and skin care this shift. Pt was medicated with PRN Zofran to help relieve nausea. Also, pt was medicated with PRN Tramadol and Flexeril to help relieve pain and muscle spasms. Blood glucose readings were 150, 132, 106. SSI was administered when order parameters were met.

## 2025-01-05 LAB
ANION GAP SERPL CALCULATED.3IONS-SCNC: 6.6 MMOL/L (ref 5–15)
ANISOCYTOSIS BLD QL: NORMAL
BASOPHILS # BLD AUTO: 0.06 10*3/MM3 (ref 0–0.2)
BASOPHILS NFR BLD AUTO: 1.1 % (ref 0–1.5)
BUN SERPL-MCNC: 11 MG/DL (ref 8–23)
BUN/CREAT SERPL: 19.6 (ref 7–25)
CALCIUM SPEC-SCNC: 8.2 MG/DL (ref 8.6–10.5)
CHLORIDE SERPL-SCNC: 99 MMOL/L (ref 98–107)
CO2 SERPL-SCNC: 27.4 MMOL/L (ref 22–29)
CREAT SERPL-MCNC: 0.56 MG/DL (ref 0.76–1.27)
DEPRECATED RDW RBC AUTO: 54.6 FL (ref 37–54)
EGFRCR SERPLBLD CKD-EPI 2021: 108.7 ML/MIN/1.73
EOSINOPHIL # BLD AUTO: 0.15 10*3/MM3 (ref 0–0.4)
EOSINOPHIL NFR BLD AUTO: 2.9 % (ref 0.3–6.2)
ERYTHROCYTE [DISTWIDTH] IN BLOOD BY AUTOMATED COUNT: 22.3 % (ref 12.3–15.4)
GLUCOSE BLDC GLUCOMTR-MCNC: 126 MG/DL (ref 70–99)
GLUCOSE BLDC GLUCOMTR-MCNC: 137 MG/DL (ref 70–99)
GLUCOSE BLDC GLUCOMTR-MCNC: 145 MG/DL (ref 70–99)
GLUCOSE BLDC GLUCOMTR-MCNC: 161 MG/DL (ref 70–99)
GLUCOSE SERPL-MCNC: 195 MG/DL (ref 65–99)
HCT VFR BLD AUTO: 33.1 % (ref 37.5–51)
HGB BLD-MCNC: 9.5 G/DL (ref 13–17.7)
HYPOCHROMIA BLD QL: NORMAL
IMM GRANULOCYTES # BLD AUTO: 0.01 10*3/MM3 (ref 0–0.05)
IMM GRANULOCYTES NFR BLD AUTO: 0.2 % (ref 0–0.5)
LYMPHOCYTES # BLD AUTO: 0.91 10*3/MM3 (ref 0.7–3.1)
LYMPHOCYTES NFR BLD AUTO: 17.4 % (ref 19.6–45.3)
MACROCYTES BLD QL SMEAR: NORMAL
MAGNESIUM SERPL-MCNC: 2 MG/DL (ref 1.6–2.4)
MCH RBC QN AUTO: 20.3 PG (ref 26.6–33)
MCHC RBC AUTO-ENTMCNC: 28.7 G/DL (ref 31.5–35.7)
MCV RBC AUTO: 70.6 FL (ref 79–97)
MICROCYTES BLD QL: NORMAL
MONOCYTES # BLD AUTO: 0.66 10*3/MM3 (ref 0.1–0.9)
MONOCYTES NFR BLD AUTO: 12.6 % (ref 5–12)
NEUTROPHILS NFR BLD AUTO: 3.43 10*3/MM3 (ref 1.7–7)
NEUTROPHILS NFR BLD AUTO: 65.8 % (ref 42.7–76)
NRBC BLD AUTO-RTO: 0 /100 WBC (ref 0–0.2)
OVALOCYTES BLD QL SMEAR: NORMAL
PHOSPHATE SERPL-MCNC: 3.3 MG/DL (ref 2.5–4.5)
PLATELET # BLD AUTO: 131 10*3/MM3 (ref 140–450)
PMV BLD AUTO: ABNORMAL FL
POTASSIUM SERPL-SCNC: 3.8 MMOL/L (ref 3.5–5.2)
RBC # BLD AUTO: 4.69 10*6/MM3 (ref 4.14–5.8)
SMALL PLATELETS BLD QL SMEAR: NORMAL
SODIUM SERPL-SCNC: 133 MMOL/L (ref 136–145)
WBC MORPH BLD: NORMAL
WBC NRBC COR # BLD AUTO: 5.22 10*3/MM3 (ref 3.4–10.8)

## 2025-01-05 PROCEDURE — 82948 REAGENT STRIP/BLOOD GLUCOSE: CPT

## 2025-01-05 PROCEDURE — 99232 SBSQ HOSP IP/OBS MODERATE 35: CPT | Performed by: INTERNAL MEDICINE

## 2025-01-05 PROCEDURE — 84100 ASSAY OF PHOSPHORUS: CPT | Performed by: INTERNAL MEDICINE

## 2025-01-05 PROCEDURE — 25010000002 FUROSEMIDE PER 20 MG: Performed by: FAMILY MEDICINE

## 2025-01-05 PROCEDURE — 85007 BL SMEAR W/DIFF WBC COUNT: CPT | Performed by: INTERNAL MEDICINE

## 2025-01-05 PROCEDURE — 25010000002 ACETAZOLAMIDE PER 500 MG: Performed by: INTERNAL MEDICINE

## 2025-01-05 PROCEDURE — 63710000001 INSULIN GLARGINE PER 5 UNITS: Performed by: FAMILY MEDICINE

## 2025-01-05 PROCEDURE — 85025 COMPLETE CBC W/AUTO DIFF WBC: CPT | Performed by: INTERNAL MEDICINE

## 2025-01-05 PROCEDURE — 63710000001 INSULIN LISPRO (HUMAN) PER 5 UNITS: Performed by: HOSPITALIST

## 2025-01-05 PROCEDURE — 80048 BASIC METABOLIC PNL TOTAL CA: CPT | Performed by: INTERNAL MEDICINE

## 2025-01-05 PROCEDURE — 83735 ASSAY OF MAGNESIUM: CPT | Performed by: INTERNAL MEDICINE

## 2025-01-05 RX ORDER — OXYCODONE HYDROCHLORIDE 5 MG/1
5 TABLET ORAL EVERY 8 HOURS PRN
Status: DISPENSED | OUTPATIENT
Start: 2025-01-05 | End: 2025-01-12

## 2025-01-05 RX ADMIN — Medication 10 ML: at 20:39

## 2025-01-05 RX ADMIN — OXYCODONE 5 MG: 5 TABLET ORAL at 12:34

## 2025-01-05 RX ADMIN — Medication 10 ML: at 09:13

## 2025-01-05 RX ADMIN — DIGOXIN 125 MCG: 250 TABLET ORAL at 12:30

## 2025-01-05 RX ADMIN — INSULIN GLARGINE 13 UNITS: 100 INJECTION, SOLUTION SUBCUTANEOUS at 09:12

## 2025-01-05 RX ADMIN — TRAMADOL HYDROCHLORIDE 100 MG: 50 TABLET, COATED ORAL at 09:12

## 2025-01-05 RX ADMIN — TRAMADOL HYDROCHLORIDE 100 MG: 50 TABLET, COATED ORAL at 18:15

## 2025-01-05 RX ADMIN — TRAMADOL HYDROCHLORIDE 100 MG: 50 TABLET, COATED ORAL at 03:08

## 2025-01-05 RX ADMIN — SERTRALINE 50 MG: 50 TABLET, FILM COATED ORAL at 20:39

## 2025-01-05 RX ADMIN — BUPROPION HYDROCHLORIDE 150 MG: 150 TABLET, EXTENDED RELEASE ORAL at 09:12

## 2025-01-05 RX ADMIN — APIXABAN 5 MG: 5 TABLET, FILM COATED ORAL at 09:12

## 2025-01-05 RX ADMIN — ACETAZOLAMIDE 500 MG: 500 INJECTION, POWDER, LYOPHILIZED, FOR SOLUTION INTRAVENOUS at 09:17

## 2025-01-05 RX ADMIN — Medication 800 MG: at 09:12

## 2025-01-05 RX ADMIN — APIXABAN 5 MG: 5 TABLET, FILM COATED ORAL at 20:39

## 2025-01-05 RX ADMIN — FUROSEMIDE 40 MG: 10 INJECTION, SOLUTION INTRAMUSCULAR; INTRAVENOUS at 09:12

## 2025-01-05 RX ADMIN — ATORVASTATIN CALCIUM 10 MG: 10 TABLET, FILM COATED ORAL at 20:39

## 2025-01-05 RX ADMIN — CYCLOBENZAPRINE HYDROCHLORIDE 10 MG: 10 TABLET, FILM COATED ORAL at 10:43

## 2025-01-05 RX ADMIN — FUROSEMIDE 40 MG: 10 INJECTION, SOLUTION INTRAMUSCULAR; INTRAVENOUS at 18:17

## 2025-01-05 RX ADMIN — OXYCODONE 5 MG: 5 TABLET ORAL at 05:58

## 2025-01-05 RX ADMIN — METOPROLOL SUCCINATE 12.5 MG: 25 TABLET, EXTENDED RELEASE ORAL at 09:12

## 2025-01-05 RX ADMIN — CYCLOBENZAPRINE HYDROCHLORIDE 10 MG: 10 TABLET, FILM COATED ORAL at 20:39

## 2025-01-05 RX ADMIN — OXYCODONE 5 MG: 5 TABLET ORAL at 20:39

## 2025-01-05 RX ADMIN — Medication 800 MG: at 20:39

## 2025-01-05 RX ADMIN — CYCLOBENZAPRINE HYDROCHLORIDE 10 MG: 10 TABLET, FILM COATED ORAL at 01:47

## 2025-01-05 RX ADMIN — INSULIN LISPRO 2 UNITS: 100 INJECTION, SOLUTION INTRAVENOUS; SUBCUTANEOUS at 12:30

## 2025-01-05 NOTE — PROGRESS NOTES
[facility]   Hospitalist Progress Note  Date: 2025  Patient Name: Jose Shaikh  : 1960  MRN: [id number]  Date of admission: 2024      Subjective   Subjective     Chief Complaint: Follow-up lower extremity swelling    Summary:Jose Shaikh is a 64 y.o. male who presents to the emergency room for worsening lower extremity swelling, shortness of breath, and suicidal ideation. Patient also states that he does not have anybody at home to take care of him. He has chronic leg wounds. He has a history of diabetes atrial fibrillation ulcers. The patient states that he is going to take all his muscle relaxants to kill himself because he cannot go on living like this. He was admitted for further care, psychiatry was consulted. Patient was no longer suicidal after he was admitted to the hospital and bedside sitter was discontinued. He was started on Wellbutrin per psychiatry recommendations. He was diuresed aggressively, King catheter was placed by urology. Wound care following. Can have a voiding trial prior to discharge. Having good urine output with over 25 L of fluid removed since admission.  Unable to go to rehab, unable to have home health agency except at discharge.  When euvolemic, plan to discharge home.    Interval Followup: No acute events overnight.  No new complaints.     Objective   Objective     Vitals:   Temp:  [97.2 °F (36.2 °C)-97.9 °F (36.6 °C)] 97.9 °F (36.6 °C)  Heart Rate:  [71-78] 78  Resp:  [18] 18  BP: ()/(51-89) 115/58  Physical Exam   Gen: Obese male, NAD, WDWN  ENT: PERRL, EOMI   CV: RRR no MRG  Pulm: CTAB, no w/r/r  GI: Abd soft, NTND, +bs  Neuro: Moving all extremities spontaneously, CN II-XII grossly intact   Psych: A&O*3, normal mood and affect  Skin: Still with lower extremity swelling but significantly improved, lower extremities wrapped    Result Review    I have personally reviewed these results and agree with these findings:  [x]  Laboratory  LAB RESULTS:  Personally reviewed BMP, CBC, magnesium, phosphorus, blood sugars      Lab 01/05/25  0539 01/04/25  0605 01/03/25  0548 01/02/25  0614 01/01/25  0522   WBC 5.22 5.91 4.07 5.69 8.16   HEMOGLOBIN 9.5* 9.9* 9.6* 8.9* 9.2*   HEMATOCRIT 33.1* 34.8* 33.4* 31.4* 31.7*   PLATELETS 131* 140 137* 141 139*   NEUTROS ABS 3.43 3.98 2.49 4.33 6.23   IMMATURE GRANS (ABS) 0.01 0.02 0.00 0.02 0.04   LYMPHS ABS 0.91 0.89 0.70 0.50* 0.86   MONOS ABS 0.66 0.84 0.72 0.72 0.88   EOS ABS 0.15 0.13 0.12 0.06 0.11   MCV 70.6* 70.3* 71.1* 71.4* 69.7*         Lab 01/05/25  0539 01/04/25  0605 01/03/25  0548 01/02/25  0614 01/01/25  0522   SODIUM 133* 134* 132* 132* 133*   POTASSIUM 3.8 4.0 4.3 3.6 3.4*   CHLORIDE 99 100 96* 98 96*   CO2 27.4 26.4 30.3* 26.1 28.6   ANION GAP 6.6 7.6 5.7 7.9 8.4   BUN 11 10 10 10 11   CREATININE 0.56* 0.43* 0.49* 0.43* 0.46*   EGFR 108.7 117.7 113.2 117.7 115.4   GLUCOSE 195* 140* 141* 132* 205*   CALCIUM 8.2* 8.3* 8.1* 7.8* 7.9*   MAGNESIUM 2.0 2.0 1.8 1.5* 1.6   PHOSPHORUS 3.3 3.3 2.6 2.4* 2.8                         Brief Urine Lab Results  (Last result in the past 365 days)        Color   Clarity   Blood   Leuk Est   Nitrite   Protein   CREAT   Urine HCG        12/15/24 1907 Dark Yellow   Cloudy   Negative   Negative   Negative   Trace                 Microbiology Results (last 10 days)       ** No results found for the last 240 hours. **            []  Microbiology  []  Radiology  No radiology results for the last 7 days    [x]  EKG/Telemetry   []  Cardiology/Vascular   []  Pathology  []  Old records  [x]  Other:  Scheduled Meds:apixaban, 5 mg, Oral, Q12H  atorvastatin, 10 mg, Oral, Nightly  buPROPion XL, 150 mg, Oral, Daily  digoxin, 125 mcg, Oral, Daily  furosemide, 40 mg, Intravenous, BID Diuretics  insulin glargine, 13 Units, Subcutaneous, Daily  insulin lispro, 2-9 Units, Subcutaneous, 4x Daily AC & at Bedtime  magnesium oxide, 800 mg, Oral, BID  metoprolol succinate XL, 12.5 mg, Oral, Q24H  mineral  oil-hydrophilic petrolatum, 1 Application, Topical, Daily  senna-docusate sodium, 2 tablet, Oral, BID  sertraline, 50 mg, Oral, Nightly  sodium chloride, 10 mL, Intravenous, Q12H      Continuous Infusions:   PRN Meds:.  acetaminophen **OR** acetaminophen **OR** acetaminophen    senna-docusate sodium **AND** polyethylene glycol **AND** bisacodyl **AND** bisacodyl    calcium carbonate    cyclobenzaprine    dextrose    dextrose    glucagon (human recombinant)    loperamide    melatonin    ondansetron ODT **OR** ondansetron    oxyCODONE    sodium chloride    sodium chloride    traMADol      Assessment & Plan   Assessment / Plan   Acute on chronic diastolic congestive heart failure with acute exacerbation  Suicidal ideation  Chronic bilateral foot wounds  Paroxysmal atrial fibrillation  Type 2 diabetes mellitus  Morbid obesity with BMI of 52  Debility with wheelchair dependence  Osteoarthritis of the hip  Chronic venous stasis  Deconditioning  Hypomagnesemia  Buried penis  Hypophosphatemia  Hypokalemia  Hemorrhoids  Anasarca  Urinary retention  Microcytic anemia    Continue to monitor in the hospital for workup and management of the above  Continue fluid restriction  Continue Lasix 40 mg IV twice daily, dose Diamox 500 mg IV x 1 again  Trend renal function and electrolytes closely while on IV diuretics  Continue strict I's and O's  Repeat weight today  Continue sertraline and bupropion  Continue wound care per nursing  Continue metoprolol, digoxin, Eliquis  Continue Lantus and sliding scale insulin, Blood sugars acceptable  Continue diabetic heart healthy diet  Continue oral analgesics as needed for pain control  Can attempt voiding trial prior to discharge   Patient received iron replacement during current hospitalization  Continue physical therapy Occupational Therapy  Patient will not be able to go to rehab and apparently there are no home health agencies he will  his case.  He will need to be discharged home  when medically ready  Trend renal function and electrolytes with a.m. BMP, magnesium   Trend Hgb and WBC with a.m. CBC    Discussed case with: Bedside RN    VTE Prophylaxis:  Pharmacologic & mechanical VTE prophylaxis orders are present.        CODE STATUS:   Level Of Support Discussed With: Patient  Code Status (Patient has no pulse and is not breathing): CPR (Attempt to Resuscitate)  Medical Interventions (Patient has pulse or is breathing): Full Support

## 2025-01-05 NOTE — PLAN OF CARE
Goal Outcome Evaluation:   Pt remained A&Ox4 this shift. Also, pt remained on room air maintaining stable oxygen saturation. Pt was medicated with PRN Flexeril, Oxycodone, and Tramadol to help relieve pain. Pts peralta catheter was irrigated and drainage bag was changed this shift. Pt continues to refuse turns, wound care, and skin care. Pt was asked frequently and educated.

## 2025-01-05 NOTE — PLAN OF CARE
Goal Outcome Evaluation:  Plan of Care Reviewed With: patient        A & O x 4, C/O pain, medication given as ordered, refusing to be turned every two hours, allows turns occasionally. Provided skin care as tolerated, did not allow complete bed bath from PCA. Refused stool softeners, education provided, but refusing medication. Awake at this time appears to be watching television.

## 2025-01-06 LAB
ANION GAP SERPL CALCULATED.3IONS-SCNC: 7.9 MMOL/L (ref 5–15)
BASOPHILS # BLD AUTO: 0.05 10*3/MM3 (ref 0–0.2)
BASOPHILS NFR BLD AUTO: 0.9 % (ref 0–1.5)
BUN SERPL-MCNC: 13 MG/DL (ref 8–23)
BUN/CREAT SERPL: 21.3 (ref 7–25)
CALCIUM SPEC-SCNC: 8.1 MG/DL (ref 8.6–10.5)
CHLORIDE SERPL-SCNC: 99 MMOL/L (ref 98–107)
CO2 SERPL-SCNC: 25.1 MMOL/L (ref 22–29)
CREAT SERPL-MCNC: 0.61 MG/DL (ref 0.76–1.27)
DEPRECATED RDW RBC AUTO: 53.4 FL (ref 37–54)
EGFRCR SERPLBLD CKD-EPI 2021: 105.9 ML/MIN/1.73
EOSINOPHIL # BLD AUTO: 0.14 10*3/MM3 (ref 0–0.4)
EOSINOPHIL NFR BLD AUTO: 2.4 % (ref 0.3–6.2)
ERYTHROCYTE [DISTWIDTH] IN BLOOD BY AUTOMATED COUNT: 21.8 % (ref 12.3–15.4)
GLUCOSE BLDC GLUCOMTR-MCNC: 115 MG/DL (ref 70–99)
GLUCOSE BLDC GLUCOMTR-MCNC: 134 MG/DL (ref 70–99)
GLUCOSE BLDC GLUCOMTR-MCNC: 136 MG/DL (ref 70–99)
GLUCOSE BLDC GLUCOMTR-MCNC: 97 MG/DL (ref 70–99)
GLUCOSE SERPL-MCNC: 139 MG/DL (ref 65–99)
HCT VFR BLD AUTO: 34.8 % (ref 37.5–51)
HGB BLD-MCNC: 10 G/DL (ref 13–17.7)
IMM GRANULOCYTES # BLD AUTO: 0.01 10*3/MM3 (ref 0–0.05)
IMM GRANULOCYTES NFR BLD AUTO: 0.2 % (ref 0–0.5)
LYMPHOCYTES # BLD AUTO: 0.7 10*3/MM3 (ref 0.7–3.1)
LYMPHOCYTES NFR BLD AUTO: 12 % (ref 19.6–45.3)
MAGNESIUM SERPL-MCNC: 1.9 MG/DL (ref 1.6–2.4)
MCH RBC QN AUTO: 20.2 PG (ref 26.6–33)
MCHC RBC AUTO-ENTMCNC: 28.7 G/DL (ref 31.5–35.7)
MCV RBC AUTO: 70.4 FL (ref 79–97)
MONOCYTES # BLD AUTO: 0.58 10*3/MM3 (ref 0.1–0.9)
MONOCYTES NFR BLD AUTO: 9.9 % (ref 5–12)
NEUTROPHILS NFR BLD AUTO: 4.36 10*3/MM3 (ref 1.7–7)
NEUTROPHILS NFR BLD AUTO: 74.6 % (ref 42.7–76)
NRBC BLD AUTO-RTO: 0 /100 WBC (ref 0–0.2)
PHOSPHATE SERPL-MCNC: 3 MG/DL (ref 2.5–4.5)
PLATELET # BLD AUTO: 127 10*3/MM3 (ref 140–450)
PMV BLD AUTO: ABNORMAL FL
POTASSIUM SERPL-SCNC: 3.8 MMOL/L (ref 3.5–5.2)
RBC # BLD AUTO: 4.94 10*6/MM3 (ref 4.14–5.8)
SODIUM SERPL-SCNC: 132 MMOL/L (ref 136–145)
WBC NRBC COR # BLD AUTO: 5.84 10*3/MM3 (ref 3.4–10.8)

## 2025-01-06 PROCEDURE — 63710000001 INSULIN GLARGINE PER 5 UNITS: Performed by: FAMILY MEDICINE

## 2025-01-06 PROCEDURE — 63710000001 ONDANSETRON ODT 4 MG TABLET DISPERSIBLE: Performed by: HOSPITALIST

## 2025-01-06 PROCEDURE — 82948 REAGENT STRIP/BLOOD GLUCOSE: CPT

## 2025-01-06 PROCEDURE — 85025 COMPLETE CBC W/AUTO DIFF WBC: CPT | Performed by: INTERNAL MEDICINE

## 2025-01-06 PROCEDURE — 84100 ASSAY OF PHOSPHORUS: CPT | Performed by: INTERNAL MEDICINE

## 2025-01-06 PROCEDURE — 25010000002 FUROSEMIDE PER 20 MG: Performed by: FAMILY MEDICINE

## 2025-01-06 PROCEDURE — 83735 ASSAY OF MAGNESIUM: CPT | Performed by: INTERNAL MEDICINE

## 2025-01-06 PROCEDURE — 99232 SBSQ HOSP IP/OBS MODERATE 35: CPT | Performed by: INTERNAL MEDICINE

## 2025-01-06 PROCEDURE — 80048 BASIC METABOLIC PNL TOTAL CA: CPT | Performed by: INTERNAL MEDICINE

## 2025-01-06 RX ORDER — FUROSEMIDE 40 MG/1
40 TABLET ORAL
Status: DISCONTINUED | OUTPATIENT
Start: 2025-01-06 | End: 2025-01-09

## 2025-01-06 RX ORDER — FERROUS SULFATE 325(65) MG
325 TABLET ORAL
Status: DISCONTINUED | OUTPATIENT
Start: 2025-01-06 | End: 2025-01-16 | Stop reason: HOSPADM

## 2025-01-06 RX ORDER — LIDOCAINE 4 G/G
3 PATCH TOPICAL
Status: DISCONTINUED | OUTPATIENT
Start: 2025-01-06 | End: 2025-01-16 | Stop reason: HOSPADM

## 2025-01-06 RX ADMIN — METOPROLOL SUCCINATE 12.5 MG: 25 TABLET, EXTENDED RELEASE ORAL at 09:23

## 2025-01-06 RX ADMIN — Medication 10 ML: at 20:02

## 2025-01-06 RX ADMIN — TRAMADOL HYDROCHLORIDE 100 MG: 50 TABLET, COATED ORAL at 07:29

## 2025-01-06 RX ADMIN — TRAMADOL HYDROCHLORIDE 100 MG: 50 TABLET, COATED ORAL at 01:04

## 2025-01-06 RX ADMIN — TRAMADOL HYDROCHLORIDE 100 MG: 50 TABLET, COATED ORAL at 20:01

## 2025-01-06 RX ADMIN — LIDOCAINE 3 PATCH: 4 PATCH TOPICAL at 16:49

## 2025-01-06 RX ADMIN — FERROUS SULFATE TAB 325 MG (65 MG ELEMENTAL FE) 325 MG: 325 (65 FE) TAB at 09:24

## 2025-01-06 RX ADMIN — SERTRALINE 50 MG: 50 TABLET, FILM COATED ORAL at 20:01

## 2025-01-06 RX ADMIN — CYCLOBENZAPRINE HYDROCHLORIDE 10 MG: 10 TABLET, FILM COATED ORAL at 04:41

## 2025-01-06 RX ADMIN — APIXABAN 5 MG: 5 TABLET, FILM COATED ORAL at 20:01

## 2025-01-06 RX ADMIN — BUPROPION HYDROCHLORIDE 150 MG: 150 TABLET, EXTENDED RELEASE ORAL at 09:24

## 2025-01-06 RX ADMIN — Medication 10 ML: at 09:24

## 2025-01-06 RX ADMIN — Medication 800 MG: at 20:01

## 2025-01-06 RX ADMIN — TRAMADOL HYDROCHLORIDE 100 MG: 50 TABLET, COATED ORAL at 13:56

## 2025-01-06 RX ADMIN — OXYCODONE 5 MG: 5 TABLET ORAL at 22:22

## 2025-01-06 RX ADMIN — CYCLOBENZAPRINE HYDROCHLORIDE 10 MG: 10 TABLET, FILM COATED ORAL at 22:22

## 2025-01-06 RX ADMIN — APIXABAN 5 MG: 5 TABLET, FILM COATED ORAL at 09:23

## 2025-01-06 RX ADMIN — OXYCODONE 5 MG: 5 TABLET ORAL at 04:41

## 2025-01-06 RX ADMIN — OXYCODONE 5 MG: 5 TABLET ORAL at 12:50

## 2025-01-06 RX ADMIN — FUROSEMIDE 40 MG: 10 INJECTION, SOLUTION INTRAMUSCULAR; INTRAVENOUS at 09:24

## 2025-01-06 RX ADMIN — ATORVASTATIN CALCIUM 10 MG: 10 TABLET, FILM COATED ORAL at 20:01

## 2025-01-06 RX ADMIN — ONDANSETRON 4 MG: 4 TABLET, ORALLY DISINTEGRATING ORAL at 21:01

## 2025-01-06 RX ADMIN — DIGOXIN 125 MCG: 250 TABLET ORAL at 12:49

## 2025-01-06 RX ADMIN — Medication 800 MG: at 09:24

## 2025-01-06 RX ADMIN — FUROSEMIDE 40 MG: 40 TABLET ORAL at 12:50

## 2025-01-06 RX ADMIN — CYCLOBENZAPRINE HYDROCHLORIDE 10 MG: 10 TABLET, FILM COATED ORAL at 12:49

## 2025-01-06 RX ADMIN — INSULIN GLARGINE 13 UNITS: 100 INJECTION, SOLUTION SUBCUTANEOUS at 09:24

## 2025-01-06 RX ADMIN — FUROSEMIDE 40 MG: 40 TABLET ORAL at 17:17

## 2025-01-06 NOTE — PROGRESS NOTES
[facility]   Hospitalist Progress Note  Date: 2025  Patient Name: Jose Shaikh  : 1960  MRN: [id number]  Date of admission: 2024      Subjective   Subjective     Chief Complaint: Follow-up lower extremity swelling    Summary:Jose Shaikh is a 64 y.o. male who presents to the emergency room for worsening lower extremity swelling, shortness of breath, and suicidal ideation. Patient also states that he does not have anybody at home to take care of him. He has chronic leg wounds. He has a history of diabetes atrial fibrillation ulcers. The patient states that he is going to take all his muscle relaxants to kill himself because he cannot go on living like this. He was admitted for further care, psychiatry was consulted. Patient was no longer suicidal after he was admitted to the hospital and bedside sitter was discontinued. He was started on Wellbutrin per psychiatry recommendations. He was diuresed aggressively, King catheter was placed by urology. Wound care following. Can have a voiding trial prior to discharge. Having good urine output with over 25 L of fluid removed since admission.  Unable to go to rehab, unable to have home health agency except at discharge.  When euvolemic, plan to discharge home.    Interval Followup: King catheter clogged last night and was ultimately removed.  Is voiding spontaneously without issues.  Still has intermittent muscle spasms.    Objective   Objective     Vitals:   Temp:  [97.2 °F (36.2 °C)-98.1 °F (36.7 °C)] 97.2 °F (36.2 °C)  Heart Rate:  [73-84] 84  Resp:  [16-18] 18  BP: ()/(53-66) 129/65  Physical Exam   Gen: Obese male, NAD, WDWN, resting in bed  ENT: PERRL, EOMI   CV: RRR no MRG  Pulm: CTAB, no w/r/r  GI: Abd soft, NTND, +bs  Neuro: Moving all extremities spontaneously, globally weak, CN II-XII grossly intact   Psych: A&O*3, normal mood and affect  Skin: Lower extremities wrapped    Result Review    I have personally reviewed these results  and agree with these findings:  [x]  Laboratory  LAB RESULTS: Personally reviewed BMP, CBC, magnesium, phosphorus, blood sugars      Lab 01/06/25  0520 01/05/25  0539 01/04/25  0605 01/03/25  0548 01/02/25  0614   WBC 5.84 5.22 5.91 4.07 5.69   HEMOGLOBIN 10.0* 9.5* 9.9* 9.6* 8.9*   HEMATOCRIT 34.8* 33.1* 34.8* 33.4* 31.4*   PLATELETS 127* 131* 140 137* 141   NEUTROS ABS 4.36 3.43 3.98 2.49 4.33   IMMATURE GRANS (ABS) 0.01 0.01 0.02 0.00 0.02   LYMPHS ABS 0.70 0.91 0.89 0.70 0.50*   MONOS ABS 0.58 0.66 0.84 0.72 0.72   EOS ABS 0.14 0.15 0.13 0.12 0.06   MCV 70.4* 70.6* 70.3* 71.1* 71.4*         Lab 01/06/25  0520 01/05/25  0539 01/04/25  0605 01/03/25  0548 01/02/25  0614   SODIUM 132* 133* 134* 132* 132*   POTASSIUM 3.8 3.8 4.0 4.3 3.6   CHLORIDE 99 99 100 96* 98   CO2 25.1 27.4 26.4 30.3* 26.1   ANION GAP 7.9 6.6 7.6 5.7 7.9   BUN 13 11 10 10 10   CREATININE 0.61* 0.56* 0.43* 0.49* 0.43*   EGFR 105.9 108.7 117.7 113.2 117.7   GLUCOSE 139* 195* 140* 141* 132*   CALCIUM 8.1* 8.2* 8.3* 8.1* 7.8*   MAGNESIUM 1.9 2.0 2.0 1.8 1.5*   PHOSPHORUS 3.0 3.3 3.3 2.6 2.4*                         Brief Urine Lab Results  (Last result in the past 365 days)        Color   Clarity   Blood   Leuk Est   Nitrite   Protein   CREAT   Urine HCG        12/15/24 1907 Dark Yellow   Cloudy   Negative   Negative   Negative   Trace                 Microbiology Results (last 10 days)       ** No results found for the last 240 hours. **            []  Microbiology  []  Radiology  No radiology results for the last 7 days    [x]  EKG/Telemetry   []  Cardiology/Vascular   []  Pathology  []  Old records  [x]  Other:  Scheduled Meds:apixaban, 5 mg, Oral, Q12H  atorvastatin, 10 mg, Oral, Nightly  buPROPion XL, 150 mg, Oral, Daily  digoxin, 125 mcg, Oral, Daily  ferrous sulfate, 325 mg, Oral, Daily With Breakfast  furosemide, 40 mg, Intravenous, BID Diuretics  insulin glargine, 13 Units, Subcutaneous, Daily  insulin lispro, 2-9 Units, Subcutaneous, 4x  Daily AC & at Bedtime  magnesium oxide, 800 mg, Oral, BID  metoprolol succinate XL, 12.5 mg, Oral, Q24H  mineral oil-hydrophilic petrolatum, 1 Application, Topical, Daily  senna-docusate sodium, 2 tablet, Oral, BID  sertraline, 50 mg, Oral, Nightly  sodium chloride, 10 mL, Intravenous, Q12H      Continuous Infusions:   PRN Meds:.•  acetaminophen **OR** acetaminophen **OR** acetaminophen  •  senna-docusate sodium **AND** polyethylene glycol **AND** bisacodyl **AND** bisacodyl  •  calcium carbonate  •  cyclobenzaprine  •  dextrose  •  dextrose  •  glucagon (human recombinant)  •  loperamide  •  melatonin  •  ondansetron ODT **OR** ondansetron  •  oxyCODONE  •  sodium chloride  •  sodium chloride  •  traMADol      Assessment & Plan   Assessment / Plan   Acute on chronic diastolic congestive heart failure with acute exacerbation  Suicidal ideation  Chronic bilateral foot wounds  Paroxysmal atrial fibrillation  Type 2 diabetes mellitus  Morbid obesity with BMI of 52  Debility with wheelchair dependence  Osteoarthritis of the hip  Chronic venous stasis  Deconditioning  Hypomagnesemia  Buried penis  Hypophosphatemia  Hypokalemia  Hemorrhoids  Anasarca  Urinary retention  Microcytic anemia    Continue to monitor in the hospital for workup and management of the above  Continue fluid restriction  Transition IV diuretics to Lasix 40 mg p.o. twice daily  Continue strict I's and O's  Voiding trial successfully completed, keep King catheter out now and at discharge  Continue sertraline and bupropion  Continue wound care per nursing  Continue metoprolol, digoxin, Eliquis  Continue Lantus and sliding scale insulin, Blood sugars acceptable  Continue diabetic heart healthy diet  Continue oral analgesics as needed for pain control  Patient received iron replacement during current hospitalization  Continue physical therapy Occupational Therapy  Patient will not be able to go to rehab and apparently there are no home health agencies he  will  his case.  He will need to be discharged home hopefully later in the week when medically ready  Trend renal function and electrolytes with a.m. BMP, magnesium   Trend Hgb and WBC with a.m. CBC    Discussed case with: Bedside RN    VTE Prophylaxis:  Pharmacologic & mechanical VTE prophylaxis orders are present.        CODE STATUS:   Level Of Support Discussed With: Patient  Code Status (Patient has no pulse and is not breathing): CPR (Attempt to Resuscitate)  Medical Interventions (Patient has pulse or is breathing): Full Support

## 2025-01-06 NOTE — PLAN OF CARE
Goal Outcome Evaluation:      Pt aox4, vs stable on room air. Prn oxycodone, ultram and flexeril given per mar for Left hip pain and muscle spasms. Pt urinating around catheter this shift and complaining of pain, MD notified. Attempted to irrigate catheter but unable to flush, Dr finley notified, and order given to remove peralta. Peralta removed, large amount of sediment noted throughout the catheter and the tip of the catheter was occluded with sediment. Pt able to spontaneously void, with slight hematuria. Pt allowed all wound care to be performed this shift and allowed compression but did not allow RN to apply aquaphor or moisturizer to legs.

## 2025-01-06 NOTE — PLAN OF CARE
Goal Outcome Evaluation:   Alert and oriented x4. x4 complaints of pain, medicated per mar. Refusing turns frequently, educated on importance. Refusing to allow staff to obtain daily weight via lift scale. Fluid restricted per order. Blood glucose monitored per order. Bed alarm refused, educated on importance.

## 2025-01-07 LAB
ANION GAP SERPL CALCULATED.3IONS-SCNC: 12.1 MMOL/L (ref 5–15)
BASOPHILS # BLD AUTO: 0.05 10*3/MM3 (ref 0–0.2)
BASOPHILS NFR BLD AUTO: 0.9 % (ref 0–1.5)
BUN SERPL-MCNC: 11 MG/DL (ref 8–23)
BUN/CREAT SERPL: 18.3 (ref 7–25)
CALCIUM SPEC-SCNC: 8.1 MG/DL (ref 8.6–10.5)
CHLORIDE SERPL-SCNC: 95 MMOL/L (ref 98–107)
CO2 SERPL-SCNC: 20.9 MMOL/L (ref 22–29)
CREAT SERPL-MCNC: 0.6 MG/DL (ref 0.76–1.27)
DEPRECATED RDW RBC AUTO: 56.5 FL (ref 37–54)
EGFRCR SERPLBLD CKD-EPI 2021: 106.5 ML/MIN/1.73
EOSINOPHIL # BLD AUTO: 0.05 10*3/MM3 (ref 0–0.4)
EOSINOPHIL NFR BLD AUTO: 0.9 % (ref 0.3–6.2)
ERYTHROCYTE [DISTWIDTH] IN BLOOD BY AUTOMATED COUNT: 22.7 % (ref 12.3–15.4)
GLUCOSE BLDC GLUCOMTR-MCNC: 112 MG/DL (ref 70–99)
GLUCOSE BLDC GLUCOMTR-MCNC: 115 MG/DL (ref 70–99)
GLUCOSE BLDC GLUCOMTR-MCNC: 129 MG/DL (ref 70–99)
GLUCOSE BLDC GLUCOMTR-MCNC: 158 MG/DL (ref 70–99)
GLUCOSE BLDC GLUCOMTR-MCNC: 84 MG/DL (ref 70–99)
GLUCOSE SERPL-MCNC: 116 MG/DL (ref 65–99)
HCT VFR BLD AUTO: 36.4 % (ref 37.5–51)
HGB BLD-MCNC: 10.4 G/DL (ref 13–17.7)
IMM GRANULOCYTES # BLD AUTO: 0.02 10*3/MM3 (ref 0–0.05)
IMM GRANULOCYTES NFR BLD AUTO: 0.4 % (ref 0–0.5)
LYMPHOCYTES # BLD AUTO: 0.54 10*3/MM3 (ref 0.7–3.1)
LYMPHOCYTES NFR BLD AUTO: 9.8 % (ref 19.6–45.3)
MAGNESIUM SERPL-MCNC: 2.2 MG/DL (ref 1.6–2.4)
MCH RBC QN AUTO: 20.4 PG (ref 26.6–33)
MCHC RBC AUTO-ENTMCNC: 28.6 G/DL (ref 31.5–35.7)
MCV RBC AUTO: 71.4 FL (ref 79–97)
MONOCYTES # BLD AUTO: 0.6 10*3/MM3 (ref 0.1–0.9)
MONOCYTES NFR BLD AUTO: 10.9 % (ref 5–12)
NEUTROPHILS NFR BLD AUTO: 4.23 10*3/MM3 (ref 1.7–7)
NEUTROPHILS NFR BLD AUTO: 77.1 % (ref 42.7–76)
NRBC BLD AUTO-RTO: 0 /100 WBC (ref 0–0.2)
PHOSPHATE SERPL-MCNC: 2.7 MG/DL (ref 2.5–4.5)
PLATELET # BLD AUTO: 130 10*3/MM3 (ref 140–450)
POTASSIUM SERPL-SCNC: 3.6 MMOL/L (ref 3.5–5.2)
RBC # BLD AUTO: 5.1 10*6/MM3 (ref 4.14–5.8)
SODIUM SERPL-SCNC: 128 MMOL/L (ref 136–145)
WBC NRBC COR # BLD AUTO: 5.49 10*3/MM3 (ref 3.4–10.8)

## 2025-01-07 PROCEDURE — 63710000001 INSULIN GLARGINE PER 5 UNITS: Performed by: FAMILY MEDICINE

## 2025-01-07 PROCEDURE — 83735 ASSAY OF MAGNESIUM: CPT | Performed by: INTERNAL MEDICINE

## 2025-01-07 PROCEDURE — 99232 SBSQ HOSP IP/OBS MODERATE 35: CPT | Performed by: INTERNAL MEDICINE

## 2025-01-07 PROCEDURE — 63710000001 INSULIN LISPRO (HUMAN) PER 5 UNITS: Performed by: HOSPITALIST

## 2025-01-07 PROCEDURE — 82948 REAGENT STRIP/BLOOD GLUCOSE: CPT

## 2025-01-07 PROCEDURE — 82948 REAGENT STRIP/BLOOD GLUCOSE: CPT | Performed by: HOSPITALIST

## 2025-01-07 PROCEDURE — 80048 BASIC METABOLIC PNL TOTAL CA: CPT | Performed by: INTERNAL MEDICINE

## 2025-01-07 PROCEDURE — 85025 COMPLETE CBC W/AUTO DIFF WBC: CPT | Performed by: INTERNAL MEDICINE

## 2025-01-07 PROCEDURE — 84100 ASSAY OF PHOSPHORUS: CPT | Performed by: INTERNAL MEDICINE

## 2025-01-07 RX ADMIN — ATORVASTATIN CALCIUM 10 MG: 10 TABLET, FILM COATED ORAL at 20:33

## 2025-01-07 RX ADMIN — INSULIN GLARGINE 13 UNITS: 100 INJECTION, SOLUTION SUBCUTANEOUS at 08:38

## 2025-01-07 RX ADMIN — SERTRALINE 50 MG: 50 TABLET, FILM COATED ORAL at 20:33

## 2025-01-07 RX ADMIN — TRAMADOL HYDROCHLORIDE 100 MG: 50 TABLET, COATED ORAL at 02:06

## 2025-01-07 RX ADMIN — FUROSEMIDE 40 MG: 40 TABLET ORAL at 08:46

## 2025-01-07 RX ADMIN — DIGOXIN 125 MCG: 250 TABLET ORAL at 12:42

## 2025-01-07 RX ADMIN — Medication 800 MG: at 20:33

## 2025-01-07 RX ADMIN — Medication 800 MG: at 08:39

## 2025-01-07 RX ADMIN — CYCLOBENZAPRINE HYDROCHLORIDE 10 MG: 10 TABLET, FILM COATED ORAL at 06:40

## 2025-01-07 RX ADMIN — Medication 10 ML: at 08:39

## 2025-01-07 RX ADMIN — OXYCODONE 5 MG: 5 TABLET ORAL at 15:08

## 2025-01-07 RX ADMIN — FUROSEMIDE 40 MG: 40 TABLET ORAL at 17:29

## 2025-01-07 RX ADMIN — CYCLOBENZAPRINE HYDROCHLORIDE 10 MG: 10 TABLET, FILM COATED ORAL at 15:08

## 2025-01-07 RX ADMIN — OXYCODONE 5 MG: 5 TABLET ORAL at 06:40

## 2025-01-07 RX ADMIN — APIXABAN 5 MG: 5 TABLET, FILM COATED ORAL at 20:33

## 2025-01-07 RX ADMIN — TRAMADOL HYDROCHLORIDE 100 MG: 50 TABLET, COATED ORAL at 17:29

## 2025-01-07 RX ADMIN — APIXABAN 5 MG: 5 TABLET, FILM COATED ORAL at 08:38

## 2025-01-07 RX ADMIN — Medication 10 ML: at 20:33

## 2025-01-07 RX ADMIN — FERROUS SULFATE TAB 325 MG (65 MG ELEMENTAL FE) 325 MG: 325 (65 FE) TAB at 08:39

## 2025-01-07 RX ADMIN — METOPROLOL SUCCINATE 12.5 MG: 25 TABLET, EXTENDED RELEASE ORAL at 08:46

## 2025-01-07 RX ADMIN — BUPROPION HYDROCHLORIDE 150 MG: 150 TABLET, EXTENDED RELEASE ORAL at 08:39

## 2025-01-07 RX ADMIN — INSULIN LISPRO 2 UNITS: 100 INJECTION, SOLUTION INTRAVENOUS; SUBCUTANEOUS at 17:29

## 2025-01-07 RX ADMIN — TRAMADOL HYDROCHLORIDE 100 MG: 50 TABLET, COATED ORAL at 08:39

## 2025-01-07 NOTE — PROGRESS NOTES
[facility]   Hospitalist Progress Note  Date: 2025  Patient Name: Jose Shaikh  : 1960  MRN: [id number]  Date of admission: 2024      Subjective   Subjective     Chief Complaint: Follow-up lower extremity swelling    Summary:Jose Shaikh is a 64 y.o. male who presents to the emergency room for worsening lower extremity swelling, shortness of breath, and suicidal ideation. Patient also states that he does not have anybody at home to take care of him. He has chronic leg wounds. He has a history of diabetes atrial fibrillation ulcers. The patient states that he is going to take all his muscle relaxants to kill himself because he cannot go on living like this. He was admitted for further care, psychiatry was consulted. Patient was no longer suicidal after he was admitted to the hospital and bedside sitter was discontinued. He was started on Wellbutrin per psychiatry recommendations. He was diuresed aggressively, King catheter was placed by urology. Wound care following. Can have a voiding trial prior to discharge. Having good urine output with over 25 L of fluid removed since admission.  Unable to go to rehab, unable to have home health agency except at discharge.  When euvolemic, plan to discharge home.    Interval Followup:   Patient continues to urinate without King catheter in place.  Has now been transitioned over to oral diuretics.  Continue working with case management and services about appropriate disposition for patient.    Objective   Objective     Vitals:   Temp:  [97 °F (36.1 °C)-98.1 °F (36.7 °C)] 97.2 °F (36.2 °C)  Heart Rate:  [72-78] 72  Resp:  [16-18] 16  BP: (102-122)/(50-78) 106/54  Physical Exam   Gen: Obese male, NAD, WDWN, resting in bed  ENT: PERRL, EOMI   CV: RRR no MRG  Pulm: CTAB, no w/r/r  GI: Abd soft, NTND, +bs  Neuro: Moving all extremities spontaneously, globally weak, CN II-XII grossly intact   Psych: A&O*3, normal mood and affect  Skin: Lower extremities  wrapped    Result Review    I have personally reviewed these results and agree with these findings:  [x]  Laboratory  LAB RESULTS: Personally reviewed BMP, CBC, magnesium, phosphorus, blood sugars      Lab 01/07/25  0543 01/06/25  0520 01/05/25  0539 01/04/25  0605 01/03/25  0548   WBC 5.49 5.84 5.22 5.91 4.07   HEMOGLOBIN 10.4* 10.0* 9.5* 9.9* 9.6*   HEMATOCRIT 36.4* 34.8* 33.1* 34.8* 33.4*   PLATELETS 130* 127* 131* 140 137*   NEUTROS ABS 4.23 4.36 3.43 3.98 2.49   IMMATURE GRANS (ABS) 0.02 0.01 0.01 0.02 0.00   LYMPHS ABS 0.54* 0.70 0.91 0.89 0.70   MONOS ABS 0.60 0.58 0.66 0.84 0.72   EOS ABS 0.05 0.14 0.15 0.13 0.12   MCV 71.4* 70.4* 70.6* 70.3* 71.1*         Lab 01/07/25  0543 01/06/25  0520 01/05/25  0539 01/04/25  0605 01/03/25  0548   SODIUM 128* 132* 133* 134* 132*   POTASSIUM 3.6 3.8 3.8 4.0 4.3   CHLORIDE 95* 99 99 100 96*   CO2 20.9* 25.1 27.4 26.4 30.3*   ANION GAP 12.1 7.9 6.6 7.6 5.7   BUN 11 13 11 10 10   CREATININE 0.60* 0.61* 0.56* 0.43* 0.49*   EGFR 106.5 105.9 108.7 117.7 113.2   GLUCOSE 116* 139* 195* 140* 141*   CALCIUM 8.1* 8.1* 8.2* 8.3* 8.1*   MAGNESIUM 2.2 1.9 2.0 2.0 1.8   PHOSPHORUS 2.7 3.0 3.3 3.3 2.6                         Brief Urine Lab Results  (Last result in the past 365 days)        Color   Clarity   Blood   Leuk Est   Nitrite   Protein   CREAT   Urine HCG        12/15/24 1907 Dark Yellow   Cloudy   Negative   Negative   Negative   Trace                 Microbiology Results (last 10 days)       ** No results found for the last 240 hours. **            []  Microbiology  []  Radiology  No radiology results for the last 7 days    [x]  EKG/Telemetry   []  Cardiology/Vascular   []  Pathology  []  Old records  [x]  Other:  Scheduled Meds:apixaban, 5 mg, Oral, Q12H  atorvastatin, 10 mg, Oral, Nightly  buPROPion XL, 150 mg, Oral, Daily  digoxin, 125 mcg, Oral, Daily  ferrous sulfate, 325 mg, Oral, Daily With Breakfast  furosemide, 40 mg, Oral, BID Diuretics  insulin glargine, 13 Units,  Subcutaneous, Daily  insulin lispro, 2-9 Units, Subcutaneous, 4x Daily AC & at Bedtime  Lidocaine, 3 patch, Transdermal, Q24H  magnesium oxide, 800 mg, Oral, BID  metoprolol succinate XL, 12.5 mg, Oral, Q24H  mineral oil-hydrophilic petrolatum, 1 Application, Topical, Daily  senna-docusate sodium, 2 tablet, Oral, BID  sertraline, 50 mg, Oral, Nightly  sodium chloride, 10 mL, Intravenous, Q12H      Continuous Infusions:   PRN Meds:.  acetaminophen **OR** acetaminophen **OR** acetaminophen    senna-docusate sodium **AND** polyethylene glycol **AND** bisacodyl **AND** bisacodyl    calcium carbonate    cyclobenzaprine    dextrose    dextrose    glucagon (human recombinant)    loperamide    melatonin    ondansetron ODT **OR** ondansetron    oxyCODONE    sodium chloride    sodium chloride    traMADol      Assessment & Plan   Assessment / Plan   Acute on chronic diastolic congestive heart failure with acute exacerbation  Suicidal ideation  Chronic bilateral foot wounds  Paroxysmal atrial fibrillation  Type 2 diabetes mellitus  Morbid obesity with BMI of 52  Debility with wheelchair dependence  Osteoarthritis of the hip  Chronic venous stasis  Deconditioning  Hypomagnesemia  Buried penis  Hypophosphatemia  Hypokalemia  Hemorrhoids  Anasarca  Urinary retention  Microcytic anemia    Continue to monitor in the hospital for workup and management of the above  Continue fluid restriction  Diuretics have now been transitioned to oral, continue to monitor fluid status daily to ensure this is appropriate oral dose  Continue strict I's and O's  Voiding trial successfully completed, keep King catheter out now and at discharge  Continue sertraline and bupropion  Continue wound care per nursing  Continue metoprolol, digoxin, Eliquis  Continue Lantus and sliding scale insulin, Blood sugars acceptable  Continue diabetic heart healthy diet  Continue oral analgesics as needed for pain control  Patient received iron replacement during  current hospitalization  Continue physical therapy Occupational Therapy  Wound care continues to follow along  Continue to work on appropriate disposition for patient  Trend renal function and electrolytes with a.m. BMP, magnesium   Trend Hgb and WBC with a.m. CBC    Discussed case with: Bedside RN, case management    VTE Prophylaxis:  Pharmacologic & mechanical VTE prophylaxis orders are present.        CODE STATUS:   Level Of Support Discussed With: Patient  Code Status (Patient has no pulse and is not breathing): CPR (Attempt to Resuscitate)  Medical Interventions (Patient has pulse or is breathing): Full Support

## 2025-01-07 NOTE — PLAN OF CARE
Goal Outcome Evaluation:         Pt aox4, vs stable on room air. Refusing most treatment this shift. Pt refused all wound care multiple times this shift. Refusing turns, reinforced increased risk for skin breakdown and importance of turns. Pt refusing to elevate heels. Pt calling out multiple times for pain medication before medication is due, seeking staff frequently this shift. Pt had BM and attempted to refuse to be cleaned up, educated patient that he can not be left in his own mess, finally agreed to let staff clean incontinent episode. Pt refusing bed alarm.

## 2025-01-07 NOTE — PLAN OF CARE
Goal Outcome Evaluation:   Alert and oriented x4. Medicated per mar for complaints of pain. Refusing turns frequently, educated on importance. Bed alarm refused, educated on importance. Unable to fully assess skin as patient refuses to turn. Wound care provided per order. Skin care provided as patient would allow.

## 2025-01-08 LAB
ANION GAP SERPL CALCULATED.3IONS-SCNC: 8.8 MMOL/L (ref 5–15)
ANISOCYTOSIS BLD QL: NORMAL
BASOPHILS # BLD AUTO: 0.04 10*3/MM3 (ref 0–0.2)
BASOPHILS NFR BLD AUTO: 0.7 % (ref 0–1.5)
BUN SERPL-MCNC: 9 MG/DL (ref 8–23)
BUN/CREAT SERPL: 18.4 (ref 7–25)
CALCIUM SPEC-SCNC: 7.8 MG/DL (ref 8.6–10.5)
CHLORIDE SERPL-SCNC: 98 MMOL/L (ref 98–107)
CO2 SERPL-SCNC: 26.2 MMOL/L (ref 22–29)
CREAT SERPL-MCNC: 0.49 MG/DL (ref 0.76–1.27)
DEPRECATED RDW RBC AUTO: 52.9 FL (ref 37–54)
EGFRCR SERPLBLD CKD-EPI 2021: 113.2 ML/MIN/1.73
EOSINOPHIL # BLD AUTO: 0.08 10*3/MM3 (ref 0–0.4)
EOSINOPHIL NFR BLD AUTO: 1.4 % (ref 0.3–6.2)
ERYTHROCYTE [DISTWIDTH] IN BLOOD BY AUTOMATED COUNT: 21.9 % (ref 12.3–15.4)
GLUCOSE BLDC GLUCOMTR-MCNC: 101 MG/DL (ref 70–99)
GLUCOSE BLDC GLUCOMTR-MCNC: 111 MG/DL (ref 70–99)
GLUCOSE BLDC GLUCOMTR-MCNC: 122 MG/DL (ref 70–99)
GLUCOSE BLDC GLUCOMTR-MCNC: 123 MG/DL (ref 70–99)
GLUCOSE BLDC GLUCOMTR-MCNC: 161 MG/DL (ref 70–99)
GLUCOSE SERPL-MCNC: 100 MG/DL (ref 65–99)
HCT VFR BLD AUTO: 35 % (ref 37.5–51)
HGB BLD-MCNC: 10.3 G/DL (ref 13–17.7)
IMM GRANULOCYTES # BLD AUTO: 0.02 10*3/MM3 (ref 0–0.05)
IMM GRANULOCYTES NFR BLD AUTO: 0.3 % (ref 0–0.5)
LYMPHOCYTES # BLD AUTO: 0.85 10*3/MM3 (ref 0.7–3.1)
LYMPHOCYTES NFR BLD AUTO: 14.8 % (ref 19.6–45.3)
MAGNESIUM SERPL-MCNC: 2.1 MG/DL (ref 1.6–2.4)
MCH RBC QN AUTO: 20.4 PG (ref 26.6–33)
MCHC RBC AUTO-ENTMCNC: 29.4 G/DL (ref 31.5–35.7)
MCV RBC AUTO: 69.4 FL (ref 79–97)
MICROCYTES BLD QL: NORMAL
MONOCYTES # BLD AUTO: 0.68 10*3/MM3 (ref 0.1–0.9)
MONOCYTES NFR BLD AUTO: 11.9 % (ref 5–12)
NEUTROPHILS NFR BLD AUTO: 4.06 10*3/MM3 (ref 1.7–7)
NEUTROPHILS NFR BLD AUTO: 70.9 % (ref 42.7–76)
NRBC BLD AUTO-RTO: 0 /100 WBC (ref 0–0.2)
PHOSPHATE SERPL-MCNC: 2.6 MG/DL (ref 2.5–4.5)
PLATELET # BLD AUTO: 134 10*3/MM3 (ref 140–450)
PMV BLD AUTO: ABNORMAL FL
POTASSIUM SERPL-SCNC: 2.9 MMOL/L (ref 3.5–5.2)
RBC # BLD AUTO: 5.04 10*6/MM3 (ref 4.14–5.8)
SMALL PLATELETS BLD QL SMEAR: NORMAL
SODIUM SERPL-SCNC: 133 MMOL/L (ref 136–145)
WBC MORPH BLD: NORMAL
WBC NRBC COR # BLD AUTO: 5.73 10*3/MM3 (ref 3.4–10.8)

## 2025-01-08 PROCEDURE — 99232 SBSQ HOSP IP/OBS MODERATE 35: CPT | Performed by: INTERNAL MEDICINE

## 2025-01-08 PROCEDURE — 63710000001 INSULIN LISPRO (HUMAN) PER 5 UNITS: Performed by: HOSPITALIST

## 2025-01-08 PROCEDURE — 80048 BASIC METABOLIC PNL TOTAL CA: CPT | Performed by: INTERNAL MEDICINE

## 2025-01-08 PROCEDURE — 84100 ASSAY OF PHOSPHORUS: CPT | Performed by: INTERNAL MEDICINE

## 2025-01-08 PROCEDURE — 85025 COMPLETE CBC W/AUTO DIFF WBC: CPT | Performed by: INTERNAL MEDICINE

## 2025-01-08 PROCEDURE — 83735 ASSAY OF MAGNESIUM: CPT | Performed by: INTERNAL MEDICINE

## 2025-01-08 PROCEDURE — 85007 BL SMEAR W/DIFF WBC COUNT: CPT | Performed by: INTERNAL MEDICINE

## 2025-01-08 PROCEDURE — 82948 REAGENT STRIP/BLOOD GLUCOSE: CPT | Performed by: HOSPITALIST

## 2025-01-08 PROCEDURE — 82948 REAGENT STRIP/BLOOD GLUCOSE: CPT

## 2025-01-08 PROCEDURE — 63710000001 INSULIN GLARGINE PER 5 UNITS: Performed by: FAMILY MEDICINE

## 2025-01-08 RX ORDER — POTASSIUM CHLORIDE 750 MG/1
40 CAPSULE, EXTENDED RELEASE ORAL EVERY 4 HOURS
Status: COMPLETED | OUTPATIENT
Start: 2025-01-08 | End: 2025-01-08

## 2025-01-08 RX ORDER — MIDODRINE HYDROCHLORIDE 5 MG/1
5 TABLET ORAL ONCE
Status: DISCONTINUED | OUTPATIENT
Start: 2025-01-08 | End: 2025-01-08

## 2025-01-08 RX ORDER — ALBUMIN (HUMAN) 12.5 G/50ML
25 SOLUTION INTRAVENOUS ONCE
Status: DISCONTINUED | OUTPATIENT
Start: 2025-01-08 | End: 2025-01-08

## 2025-01-08 RX ADMIN — POTASSIUM CHLORIDE 40 MEQ: 750 CAPSULE, EXTENDED RELEASE ORAL at 12:32

## 2025-01-08 RX ADMIN — OXYCODONE 5 MG: 5 TABLET ORAL at 01:40

## 2025-01-08 RX ADMIN — POTASSIUM CHLORIDE 40 MEQ: 750 CAPSULE, EXTENDED RELEASE ORAL at 09:03

## 2025-01-08 RX ADMIN — TRAMADOL HYDROCHLORIDE 100 MG: 50 TABLET, COATED ORAL at 04:01

## 2025-01-08 RX ADMIN — BUPROPION HYDROCHLORIDE 150 MG: 150 TABLET, EXTENDED RELEASE ORAL at 09:03

## 2025-01-08 RX ADMIN — FUROSEMIDE 40 MG: 40 TABLET ORAL at 09:03

## 2025-01-08 RX ADMIN — Medication 10 ML: at 09:05

## 2025-01-08 RX ADMIN — Medication 800 MG: at 21:35

## 2025-01-08 RX ADMIN — FERROUS SULFATE TAB 325 MG (65 MG ELEMENTAL FE) 325 MG: 325 (65 FE) TAB at 09:03

## 2025-01-08 RX ADMIN — TRAMADOL HYDROCHLORIDE 100 MG: 50 TABLET, COATED ORAL at 18:32

## 2025-01-08 RX ADMIN — ATORVASTATIN CALCIUM 10 MG: 10 TABLET, FILM COATED ORAL at 21:35

## 2025-01-08 RX ADMIN — CYCLOBENZAPRINE HYDROCHLORIDE 10 MG: 10 TABLET, FILM COATED ORAL at 21:35

## 2025-01-08 RX ADMIN — DIGOXIN 125 MCG: 250 TABLET ORAL at 12:32

## 2025-01-08 RX ADMIN — INSULIN LISPRO 2 UNITS: 100 INJECTION, SOLUTION INTRAVENOUS; SUBCUTANEOUS at 21:35

## 2025-01-08 RX ADMIN — Medication 10 ML: at 21:36

## 2025-01-08 RX ADMIN — TRAMADOL HYDROCHLORIDE 100 MG: 50 TABLET, COATED ORAL at 10:41

## 2025-01-08 RX ADMIN — SERTRALINE 50 MG: 50 TABLET, FILM COATED ORAL at 21:35

## 2025-01-08 RX ADMIN — FUROSEMIDE 40 MG: 40 TABLET ORAL at 18:30

## 2025-01-08 RX ADMIN — Medication 800 MG: at 09:02

## 2025-01-08 RX ADMIN — WHITE PETROLATUM 1 APPLICATION: 1.75 OINTMENT TOPICAL at 12:32

## 2025-01-08 RX ADMIN — APIXABAN 5 MG: 5 TABLET, FILM COATED ORAL at 21:35

## 2025-01-08 RX ADMIN — INSULIN GLARGINE 13 UNITS: 100 INJECTION, SOLUTION SUBCUTANEOUS at 09:02

## 2025-01-08 RX ADMIN — APIXABAN 5 MG: 5 TABLET, FILM COATED ORAL at 09:02

## 2025-01-08 RX ADMIN — METOPROLOL SUCCINATE 12.5 MG: 25 TABLET, EXTENDED RELEASE ORAL at 09:02

## 2025-01-08 NOTE — PLAN OF CARE
Goal Outcome Evaluation:         Pt has been A&Ox4 with some intermittent confusion after waking up in the middle of the night. Pt better this AM. Pt has had pain meds throughout the night per MAR. Frequent calling out. Pt refused to turn when asked, pt was educated on importance of turns.

## 2025-01-08 NOTE — PLAN OF CARE
Goal Outcome Evaluation:  Plan of Care Reviewed With: patient      Patient A/Ox4. On room air. Refuses turns. PRN Tramadol administered as ordered for pain. Wound care completed by primary nurse and wound care nurse. No other issues/needs at this time.

## 2025-01-08 NOTE — PROGRESS NOTES
[facility]   Hospitalist Progress Note  Date: 2025  Patient Name: Jose Shaikh  : 1960  MRN: [id number]  Date of admission: 2024      Subjective   Subjective     Chief Complaint: Follow-up lower extremity swelling    Summary:Jose Shaikh is a 64 y.o. male who presents to the emergency room for worsening lower extremity swelling, shortness of breath, and suicidal ideation. Patient also states that he does not have anybody at home to take care of him. He has chronic leg wounds. He has a history of diabetes atrial fibrillation ulcers. The patient states that he is going to take all his muscle relaxants to kill himself because he cannot go on living like this. He was admitted for further care, psychiatry was consulted. Patient was no longer suicidal after he was admitted to the hospital and bedside sitter was discontinued. He was started on Wellbutrin per psychiatry recommendations. He was diuresed aggressively, King catheter was placed by urology. Wound care following. Can have a voiding trial prior to discharge. Having good urine output with over 25 L of fluid removed since admission.  Unable to go to rehab, unable to have home health agency except at discharge.  When euvolemic, plan to discharge home.    Interval Followup:   Cute issues overnight, resting in bed comfortably this morning.  Patient has a weight gain of 4 pounds over the past 24 hours, however as these are all bed weights we will continue to trend.  Will monitor this value while patient is now on oral diuretics.  No worsening edema noted on exam.  Long discussion with patient at bedside about ultimate goal for him, patient states his ultimate goal is to get his hip fixed so he can return to normal life.  Patient agrees getting his hip that would require him to lose weight.  Patient reluctantly accepted the notion discharging to a facility would likely be the best place for him.  As patient is minimally active, he will need to  work on caloric restriction for weight loss.    Objective   Objective     Vitals:   Temp:  [97.9 °F (36.6 °C)-98.1 °F (36.7 °C)] 98.1 °F (36.7 °C)  Heart Rate:  [71-83] 80  Resp:  [18] 18  BP: (112-128)/(58-83) 128/65  Physical Exam   Gen: Obese male, NAD, WDWN, resting in bed  ENT: PERRL, EOMI   CV: RRR no MRG  Pulm: CTAB, no w/r/r  GI: Abd soft, NTND, +bs  Neuro: Moving all extremities spontaneously, globally weak, CN II-XII grossly intact   Psych: A&O*3, normal mood and affect  Skin: Lower extremities wrapped    Result Review    I have personally reviewed these results and agree with these findings:  [x]  Laboratory  LAB RESULTS: Personally reviewed BMP, CBC, magnesium, phosphorus, blood sugars      Lab 01/08/25  0548 01/07/25  0543 01/06/25  0520 01/05/25  0539 01/04/25  0605   WBC 5.73 5.49 5.84 5.22 5.91   HEMOGLOBIN 10.3* 10.4* 10.0* 9.5* 9.9*   HEMATOCRIT 35.0* 36.4* 34.8* 33.1* 34.8*   PLATELETS 134* 130* 127* 131* 140   NEUTROS ABS 4.06 4.23 4.36 3.43 3.98   IMMATURE GRANS (ABS) 0.02 0.02 0.01 0.01 0.02   LYMPHS ABS 0.85 0.54* 0.70 0.91 0.89   MONOS ABS 0.68 0.60 0.58 0.66 0.84   EOS ABS 0.08 0.05 0.14 0.15 0.13   MCV 69.4* 71.4* 70.4* 70.6* 70.3*         Lab 01/08/25  0548 01/07/25  0543 01/06/25  0520 01/05/25  0539 01/04/25  0605   SODIUM 133* 128* 132* 133* 134*   POTASSIUM 2.9* 3.6 3.8 3.8 4.0   CHLORIDE 98 95* 99 99 100   CO2 26.2 20.9* 25.1 27.4 26.4   ANION GAP 8.8 12.1 7.9 6.6 7.6   BUN 9 11 13 11 10   CREATININE 0.49* 0.60* 0.61* 0.56* 0.43*   EGFR 113.2 106.5 105.9 108.7 117.7   GLUCOSE 100* 116* 139* 195* 140*   CALCIUM 7.8* 8.1* 8.1* 8.2* 8.3*   MAGNESIUM 2.1 2.2 1.9 2.0 2.0   PHOSPHORUS 2.6 2.7 3.0 3.3 3.3                         Brief Urine Lab Results  (Last result in the past 365 days)        Color   Clarity   Blood   Leuk Est   Nitrite   Protein   CREAT   Urine HCG        12/15/24 1907 Dark Yellow   Cloudy   Negative   Negative   Negative   Trace                 Microbiology Results (last  10 days)       ** No results found for the last 240 hours. **            []  Microbiology  []  Radiology  No radiology results for the last 7 days    [x]  EKG/Telemetry   []  Cardiology/Vascular   []  Pathology  []  Old records  [x]  Other:  Scheduled Meds:apixaban, 5 mg, Oral, Q12H  atorvastatin, 10 mg, Oral, Nightly  buPROPion XL, 150 mg, Oral, Daily  digoxin, 125 mcg, Oral, Daily  ferrous sulfate, 325 mg, Oral, Daily With Breakfast  furosemide, 40 mg, Oral, BID Diuretics  insulin glargine, 13 Units, Subcutaneous, Daily  insulin lispro, 2-9 Units, Subcutaneous, 4x Daily AC & at Bedtime  Lidocaine, 3 patch, Transdermal, Q24H  magnesium oxide, 800 mg, Oral, BID  metoprolol succinate XL, 12.5 mg, Oral, Q24H  mineral oil-hydrophilic petrolatum, 1 Application, Topical, Daily  senna-docusate sodium, 2 tablet, Oral, BID  sertraline, 50 mg, Oral, Nightly  sodium chloride, 10 mL, Intravenous, Q12H      Continuous Infusions:   PRN Meds:.•  acetaminophen **OR** acetaminophen **OR** acetaminophen  •  senna-docusate sodium **AND** polyethylene glycol **AND** bisacodyl **AND** bisacodyl  •  calcium carbonate  •  cyclobenzaprine  •  dextrose  •  dextrose  •  glucagon (human recombinant)  •  loperamide  •  melatonin  •  ondansetron ODT **OR** ondansetron  •  oxyCODONE  •  sodium chloride  •  sodium chloride  •  traMADol      Assessment & Plan   Assessment / Plan   Acute on chronic diastolic congestive heart failure with acute exacerbation  Suicidal ideation  Chronic bilateral foot wounds  Paroxysmal atrial fibrillation  Type 2 diabetes mellitus  Morbid obesity with BMI of 52  Debility with wheelchair dependence  Osteoarthritis of the hip  Chronic venous stasis  Deconditioning  Hypomagnesemia  Buried penis  Hypophosphatemia  Hypokalemia  Hemorrhoids  Anasarca  Urinary retention  Microcytic anemia    Continue to monitor in the hospital for workup and management of the above  Continue fluid restriction  Diuretics have now been  transitioned to oral, continue to monitor fluid status daily to ensure this is appropriate oral dose  Continue strict I's and O's  Voiding trial successfully completed, keep King catheter out now and at discharge  Continue sertraline and bupropion  Continue wound care per nursing  Continue metoprolol, digoxin, Eliquis  Continue Lantus and sliding scale insulin, Blood sugars acceptable  Continue diabetic heart healthy diet  Continue oral analgesics as needed for pain control  Patient received iron replacement during current hospitalization  Continue physical therapy Occupational Therapy  Wound care continues to follow along  Continue to work on appropriate disposition for patient  Trend renal function and electrolytes with a.m. BMP, magnesium   Trend Hgb and WBC with a.m. CBC  Continue to work on appropriate disposition for patient, difficult situation given patient's past.  Continue to work with case management and hospital administration about appropriate placement or plan    Discussed case with: Bedside RN, case management    VTE Prophylaxis:  Pharmacologic & mechanical VTE prophylaxis orders are present.        CODE STATUS:   Level Of Support Discussed With: Patient  Code Status (Patient has no pulse and is not breathing): CPR (Attempt to Resuscitate)  Medical Interventions (Patient has pulse or is breathing): Full Support

## 2025-01-09 LAB
027 TOXIN: ABNORMAL
C DIFF GDH + TOXINS A+B STL QL IA.RAPID: NEGATIVE
C DIFF TOX GENS STL QL NAA+PROBE: POSITIVE
GLUCOSE BLDC GLUCOMTR-MCNC: 129 MG/DL (ref 70–99)
GLUCOSE BLDC GLUCOMTR-MCNC: 132 MG/DL (ref 70–99)
GLUCOSE BLDC GLUCOMTR-MCNC: 146 MG/DL (ref 70–99)
GLUCOSE BLDC GLUCOMTR-MCNC: 154 MG/DL (ref 70–99)

## 2025-01-09 PROCEDURE — 87449 NOS EACH ORGANISM AG IA: CPT | Performed by: PHYSICIAN ASSISTANT

## 2025-01-09 PROCEDURE — 82948 REAGENT STRIP/BLOOD GLUCOSE: CPT

## 2025-01-09 PROCEDURE — 87493 C DIFF AMPLIFIED PROBE: CPT | Performed by: PHYSICIAN ASSISTANT

## 2025-01-09 PROCEDURE — 99232 SBSQ HOSP IP/OBS MODERATE 35: CPT | Performed by: INTERNAL MEDICINE

## 2025-01-09 PROCEDURE — 63710000001 INSULIN GLARGINE PER 5 UNITS: Performed by: FAMILY MEDICINE

## 2025-01-09 PROCEDURE — 63710000001 INSULIN LISPRO (HUMAN) PER 5 UNITS: Performed by: HOSPITALIST

## 2025-01-09 PROCEDURE — 82948 REAGENT STRIP/BLOOD GLUCOSE: CPT | Performed by: HOSPITALIST

## 2025-01-09 RX ADMIN — APIXABAN 5 MG: 5 TABLET, FILM COATED ORAL at 09:16

## 2025-01-09 RX ADMIN — INSULIN LISPRO 2 UNITS: 100 INJECTION, SOLUTION INTRAVENOUS; SUBCUTANEOUS at 12:43

## 2025-01-09 RX ADMIN — TRAMADOL HYDROCHLORIDE 100 MG: 50 TABLET, COATED ORAL at 03:13

## 2025-01-09 RX ADMIN — OXYCODONE 5 MG: 5 TABLET ORAL at 12:44

## 2025-01-09 RX ADMIN — DIGOXIN 125 MCG: 250 TABLET ORAL at 12:44

## 2025-01-09 RX ADMIN — OXYCODONE 5 MG: 5 TABLET ORAL at 20:43

## 2025-01-09 RX ADMIN — CYCLOBENZAPRINE HYDROCHLORIDE 10 MG: 10 TABLET, FILM COATED ORAL at 16:49

## 2025-01-09 RX ADMIN — BUPROPION HYDROCHLORIDE 150 MG: 150 TABLET, EXTENDED RELEASE ORAL at 09:16

## 2025-01-09 RX ADMIN — INSULIN GLARGINE 13 UNITS: 100 INJECTION, SOLUTION SUBCUTANEOUS at 09:14

## 2025-01-09 RX ADMIN — ATORVASTATIN CALCIUM 10 MG: 10 TABLET, FILM COATED ORAL at 20:42

## 2025-01-09 RX ADMIN — Medication 10 ML: at 20:42

## 2025-01-09 RX ADMIN — APIXABAN 5 MG: 5 TABLET, FILM COATED ORAL at 20:42

## 2025-01-09 RX ADMIN — FERROUS SULFATE TAB 325 MG (65 MG ELEMENTAL FE) 325 MG: 325 (65 FE) TAB at 09:16

## 2025-01-09 RX ADMIN — CYCLOBENZAPRINE HYDROCHLORIDE 10 MG: 10 TABLET, FILM COATED ORAL at 22:48

## 2025-01-09 RX ADMIN — SERTRALINE 50 MG: 50 TABLET, FILM COATED ORAL at 20:43

## 2025-01-09 RX ADMIN — Medication 10 ML: at 09:18

## 2025-01-09 RX ADMIN — WHITE PETROLATUM 1 APPLICATION: 1.75 OINTMENT TOPICAL at 10:29

## 2025-01-09 RX ADMIN — Medication 800 MG: at 20:42

## 2025-01-09 RX ADMIN — Medication 800 MG: at 09:15

## 2025-01-09 RX ADMIN — FUROSEMIDE 40 MG: 40 TABLET ORAL at 09:16

## 2025-01-09 RX ADMIN — TRAMADOL HYDROCHLORIDE 100 MG: 50 TABLET, COATED ORAL at 18:52

## 2025-01-09 NOTE — PLAN OF CARE
Goal Outcome Evaluation:              Outcome Evaluation: a&ox4. vss on RA. frequently c/o 10/10 pain; medicated PRN per MAR. pt frequently refusing turns and skin care despite education. pt also non-compliant with fluid restriction despite education. no new issues/needs at this time.

## 2025-01-09 NOTE — PLAN OF CARE
Goal Outcome Evaluation:  Plan of Care Reviewed With: patient        Progress: no change  Outcome Evaluation: pt continues with the left hip pain and limited movement,  pt refuses turns unless he is being cleaned up  ,

## 2025-01-09 NOTE — PROGRESS NOTES
[facility]   Hospitalist Progress Note  Date: 2025  Patient Name: Jose Shaikh  : 1960  MRN: [id number]  Date of admission: 2024      Subjective   Subjective     Chief Complaint: Follow-up lower extremity swelling    Summary:Jose Shaikh is a 64 y.o. male who presents to the emergency room for worsening lower extremity swelling, shortness of breath, and suicidal ideation. Patient also states that he does not have anybody at home to take care of him. He has chronic leg wounds. He has a history of diabetes atrial fibrillation ulcers. The patient states that he is going to take all his muscle relaxants to kill himself because he cannot go on living like this. He was admitted for further care, psychiatry was consulted. Patient was no longer suicidal after he was admitted to the hospital and bedside sitter was discontinued. He was started on Wellbutrin per psychiatry recommendations. He was diuresed aggressively, King catheter was placed by urology. Wound care following. Can have a voiding trial prior to discharge. Having good urine output with over 25 L of fluid removed since admission.  Unable to go to rehab, unable to have home health agency except at discharge.  When euvolemic, plan to discharge home.    Interval Followup:   No acute issues overnight.  Continue discussions with patient bedside about his ultimate goals, continues to state he wants to lose the weight so that he can have surgery on his hip so that he can become mobile again.  Continue to encourage patient to consider long-term placement as this appears to be the safest disposition for him.  Patient still concerned about the loss of his check if he were to go into long-term placement.  Later, I was informed and offer is available for him in [place], patient would have to go as a long-term placement.  Case management was able to go back to bedside and relayed this information to patient, at their discussion still considering this  as an option.    Objective   Objective     Vitals:   Temp:  [97.7 °F (36.5 °C)-98.6 °F (37 °C)] 97.7 °F (36.5 °C)  Heart Rate:  [78-83] 83  Resp:  [16-18] 18  BP: (105-128)/(45-64) 108/60  Physical Exam   Gen: Obese male, NAD, WDWN, resting in bed  ENT: PERRL, EOMI   CV: RRR no MRG  Pulm: CTAB, no w/r/r  GI: Abd soft, NTND, +bs  Neuro: Moving all extremities spontaneously, globally weak, CN II-XII grossly intact   Psych: A&O*3, normal mood and affect  Skin: Lower extremities wrapped    Result Review    I have personally reviewed these results and agree with these findings:  [x]  Laboratory  LAB RESULTS: Personally reviewed BMP, CBC, magnesium, phosphorus, blood sugars      Lab 01/08/25  0548 01/07/25  0543 01/06/25  0520 01/05/25  0539 01/04/25  0605   WBC 5.73 5.49 5.84 5.22 5.91   HEMOGLOBIN 10.3* 10.4* 10.0* 9.5* 9.9*   HEMATOCRIT 35.0* 36.4* 34.8* 33.1* 34.8*   PLATELETS 134* 130* 127* 131* 140   NEUTROS ABS 4.06 4.23 4.36 3.43 3.98   IMMATURE GRANS (ABS) 0.02 0.02 0.01 0.01 0.02   LYMPHS ABS 0.85 0.54* 0.70 0.91 0.89   MONOS ABS 0.68 0.60 0.58 0.66 0.84   EOS ABS 0.08 0.05 0.14 0.15 0.13   MCV 69.4* 71.4* 70.4* 70.6* 70.3*         Lab 01/08/25  0548 01/07/25  0543 01/06/25  0520 01/05/25  0539 01/04/25  0605   SODIUM 133* 128* 132* 133* 134*   POTASSIUM 2.9* 3.6 3.8 3.8 4.0   CHLORIDE 98 95* 99 99 100   CO2 26.2 20.9* 25.1 27.4 26.4   ANION GAP 8.8 12.1 7.9 6.6 7.6   BUN 9 11 13 11 10   CREATININE 0.49* 0.60* 0.61* 0.56* 0.43*   EGFR 113.2 106.5 105.9 108.7 117.7   GLUCOSE 100* 116* 139* 195* 140*   CALCIUM 7.8* 8.1* 8.1* 8.2* 8.3*   MAGNESIUM 2.1 2.2 1.9 2.0 2.0   PHOSPHORUS 2.6 2.7 3.0 3.3 3.3                         Brief Urine Lab Results  (Last result in the past 365 days)        Color   Clarity   Blood   Leuk Est   Nitrite   Protein   CREAT   Urine HCG        12/15/24 1907 Dark Yellow   Cloudy   Negative   Negative   Negative   Trace                 Microbiology Results (last 10 days)       ** No results  found for the last 240 hours. **            []  Microbiology  []  Radiology  No radiology results for the last 7 days    [x]  EKG/Telemetry   []  Cardiology/Vascular   []  Pathology  []  Old records  [x]  Other:  Scheduled Meds:apixaban, 5 mg, Oral, Q12H  atorvastatin, 10 mg, Oral, Nightly  buPROPion XL, 150 mg, Oral, Daily  digoxin, 125 mcg, Oral, Daily  ferrous sulfate, 325 mg, Oral, Daily With Breakfast  furosemide, 40 mg, Oral, BID Diuretics  insulin glargine, 13 Units, Subcutaneous, Daily  insulin lispro, 2-9 Units, Subcutaneous, 4x Daily AC & at Bedtime  Lidocaine, 3 patch, Transdermal, Q24H  magnesium oxide, 800 mg, Oral, BID  metoprolol succinate XL, 12.5 mg, Oral, Q24H  mineral oil-hydrophilic petrolatum, 1 Application, Topical, Daily  senna-docusate sodium, 2 tablet, Oral, BID  sertraline, 50 mg, Oral, Nightly  sodium chloride, 10 mL, Intravenous, Q12H      Continuous Infusions:   PRN Meds:.  acetaminophen **OR** acetaminophen **OR** acetaminophen    senna-docusate sodium **AND** polyethylene glycol **AND** bisacodyl **AND** bisacodyl    calcium carbonate    cyclobenzaprine    dextrose    dextrose    glucagon (human recombinant)    loperamide    melatonin    ondansetron ODT **OR** ondansetron    oxyCODONE    sodium chloride    sodium chloride    traMADol      Assessment & Plan   Assessment / Plan   Acute on chronic diastolic congestive heart failure with acute exacerbation  Suicidal ideation  Chronic bilateral foot wounds  Paroxysmal atrial fibrillation  Type 2 diabetes mellitus  Morbid obesity with BMI of 52  Debility with wheelchair dependence  Osteoarthritis of the hip  Chronic venous stasis  Deconditioning  Hypomagnesemia  Buried penis  Hypophosphatemia  Hypokalemia  Hemorrhoids  Anasarca  Urinary retention  Microcytic anemia    Continue to monitor in the hospital for workup and management of the above  Continue fluid restriction  Diuretics have now been transitioned to oral, continue to monitor fluid  status daily to ensure this is appropriate oral dose  Patient's bed weight albeit unreliable has increased over the past couple of days, will increase his oral dose of Lasix  Continue strict I's and O's  Voiding trial successfully completed, keep King catheter out now and at discharge  Continue sertraline and bupropion  Continue wound care per nursing  Continue metoprolol, digoxin, Eliquis  Continue Lantus and sliding scale insulin, Blood sugars acceptable  Continue diabetic heart healthy diet  Continue oral analgesics as needed for pain control  Patient received iron replacement during current hospitalization  Continue physical therapy Occupational Therapy  Wound care continues to follow along  Continue to work on appropriate disposition for patient  Trend renal function and electrolytes with a.m. BMP, magnesium   Trend Hgb and WBC with a.m. CBC  Continue to work on appropriate disposition for patient, difficult situation given patient's past.  Continue to work with case management and hospital administration about appropriate placement or plan.  Patient apparently has bed in [place] available to him has long-term placement if he is willing, will follow-up with patient tomorrow    Discussed case with: Bedside RN, case management    VTE Prophylaxis:  Pharmacologic & mechanical VTE prophylaxis orders are present.        CODE STATUS:   Level Of Support Discussed With: Patient  Code Status (Patient has no pulse and is not breathing): CPR (Attempt to Resuscitate)  Medical Interventions (Patient has pulse or is breathing): Full Support

## 2025-01-09 NOTE — SIGNIFICANT NOTE
Wound Eval / Progress Noted    KEO Dominguez     Patient Name: Jose Shaikh  : 1960  MRN: [id number]  Today's Date: 2025                 Admit Date: 2024    Visit Dx:    ICD-10-CM ICD-9-CM   1. Hypervolemia, unspecified hypervolemia type  E87.70 276.69   2. Congestive heart failure, unspecified HF chronicity, unspecified heart failure type  I50.9 428.0   3. Suicidal ideation  R45.851 V62.84         CHF exacerbation        Past Medical History:   Diagnosis Date    Absence of toe of right foot     Acute osteomyelitis of left calcaneus  2021    Anxiety and depression     Arthritis     Cancer     Chronic pain     STATES HIS PAIN IS 10/10 AAT    Claustrophobia     Corns and callus     Diabetic ulcer of left heel associated with type 2 DM 2021    Diabetic ulcer of left heel associated with type 2 DM 2021    Diabetic ulcer of right midfoot associated with type 2 DM 2021    Difficulty walking     Essential hypertension 2021    Hammertoe     Hyperlipidemia LDL goal <100 2021    Ingrown toenail     Obesity     Paroxysmal atrial fibrillation 2021    Polyneuropathy     Pressure ulcer, stage 1     Tinea unguium     Type 2 diabetes mellitus with polyneuropathy         Past Surgical History:   Procedure Laterality Date    CYST REMOVAL      center of back; benign    EYE SURGERY      INCISION AND DRAINAGE ABSCESS      back    INCISION AND DRAINAGE LEG Right 12/10/2021    Procedure: INCISION AND DRAINAGE LOWER EXTREMITY;  Surgeon: Ash Leyva DPM;  Location: [facility] MAIN OR;  Service: Podiatry;  Laterality: Right;    OTHER SURGICAL HISTORY      Surgical clips left foot    TOE SURGERY Right     Removal of 5th toe    TRANS METATARSAL AMPUTATION Right 2021    Procedure: AMPUTATION TRANS METATARSAL;  Surgeon: Ash Leyva DPM;  Location: [facility] MAIN OR;  Service: Podiatry;  Laterality: Right;    VASCULAR SURGERY      WRIST SURGERY Left     repair of  injury         Physical Assessment:  Wound 12/19/24 0259 Right anterior foot (Active)   Wound Image   01/08/25 1624   Dressing Appearance open to air 01/08/25 1624   Closure None 01/08/25 1624   Base moist;red;tan;pink 01/08/25 1624   Periwound dry;pink 01/08/25 1624   Periwound Temperature warm 01/08/25 1624   Periwound Skin Turgor soft 01/08/25 1624   Edges open 01/08/25 1624   Wound Length (cm) 1.7 cm 01/08/25 1624   Wound Width (cm) 1.2 cm 01/08/25 1624   Wound Depth (cm) 0.1 cm 01/08/25 1624   Wound Surface Area (cm^2) 2.04 cm^2 01/08/25 1624   Wound Volume (cm^3) 0.204 cm^3 01/08/25 1624   Drainage Characteristics/Odor serosanguineous 01/08/25 1624   Drainage Amount scant 01/08/25 1624   Care, Wound cleansed with;sterile normal saline 01/08/25 1624   Dressing Care dressing removed;dressing applied;silver impregnated;hydrofiber;other (see comments);gauze;tubular wrap;elastic bandage 01/08/25 1624   Periwound Care absorptive dressing applied 01/08/25 1624       Wound Left proximal arm (Active)   Wound Image   01/08/25 1624   Dressing Appearance dry;intact 01/08/25 1624   Closure None 01/08/25 1624   Base dry;pink;red 01/08/25 1624   Red (%), Wound Tissue Color 100 01/08/25 1624   Periwound dry;pink;ecchymotic 01/08/25 1624   Periwound Temperature warm 01/08/25 1624   Periwound Skin Turgor soft 01/08/25 1624   Edges rolled/closed 01/08/25 1624   Drainage Amount none 01/08/25 1624   Care, Wound cleansed with;sterile normal saline 01/08/25 1624   Dressing Care open to air 01/08/25 1624   Periwound Care dry periwound area maintained 01/08/25 1624       Wound 12/30/24 1540 Right lower leg unspecified (Active)   Wound Image   01/08/25 1624   Dressing Appearance open to air 01/08/25 1624   Closure None 01/08/25 1624   Base moist;red 01/08/25 1624   Red (%), Wound Tissue Color 100 01/08/25 1624   Periwound dry;maroon/purple 01/08/25 1624   Periwound Temperature warm 01/08/25 1624   Periwound Skin Turgor soft 01/08/25  1624   Edges open 01/08/25 1624   Drainage Characteristics/Odor serosanguineous 01/08/25 1624   Drainage Amount small 01/08/25 1624   Care, Wound cleansed with;sterile normal saline 01/08/25 1624   Dressing Care dressing applied;silver impregnated;hydrofiber;abdominal pad;gauze;tubular wrap;elastic bandage 01/08/25 1624   Periwound Care absorptive dressing applied 01/08/25 1624       Wound 12/30/24 1540 Right anterior ankle pressure injury (Active)   Wound Image   01/08/25 1624   Dressing Appearance open to air 01/08/25 1624   Closure None 01/08/25 1624   Base closed/resurfaced;dry;nonblanchable;maroon/purple 01/08/25 1624   Periwound dry;pink 01/08/25 1624   Periwound Temperature warm 01/08/25 1624   Periwound Skin Turgor soft 01/08/25 1624   Edges rolled/closed 01/08/25 1624   Wound Length (cm) 0.7 cm 01/08/25 1624   Wound Width (cm) 0.4 cm 01/08/25 1624   Wound Depth (cm) 0 cm 01/08/25 1624   Wound Surface Area (cm^2) 0.28 cm^2 01/08/25 1624   Wound Volume (cm^3) 0 cm^3 01/08/25 1624   Drainage Amount none 01/08/25 1624   Care, Wound cleansed with;sterile normal saline 01/08/25 1624   Dressing Care dressing applied;non-adherent;petroleum-based;gauze;tubular wrap;elastic bandage 01/08/25 1624   Periwound Care dry periwound area maintained 01/08/25 1624       Wound 12/30/24 1540 Right posterior ankle pressure injury (Active)   Wound Image   01/08/25 1624   Dressing Appearance open to air 01/08/25 1624   Closure None 01/08/25 1624   Base moist;pink;dry;tan 01/08/25 1624   Periwound dry;pink 01/08/25 1624   Periwound Temperature warm 01/08/25 1624   Periwound Skin Turgor soft 01/08/25 1624   Edges open 01/08/25 1624   Wound Length (cm) 1.2 cm 01/08/25 1624   Wound Width (cm) 0.4 cm 01/08/25 1624   Wound Depth (cm) 0.1 cm 01/08/25 1624   Wound Surface Area (cm^2) 0.48 cm^2 01/08/25 1624   Wound Volume (cm^3) 0.048 cm^3 01/08/25 1624   Drainage Characteristics/Odor tan 01/08/25 1624   Drainage Amount scant 01/08/25  1624   Care, Wound cleansed with;sterile normal saline 01/08/25 1624   Dressing Care dressing applied;silver impregnated;hydrofiber;other (see comments);gauze;tubular wrap;elastic bandage 01/08/25 1624   Periwound Care absorptive dressing applied 01/08/25 1624       Wound 12/30/24 1540 Right lateral leg unspecified (Active)   Wound Image   01/08/25 1624   Dressing Appearance open to air 01/08/25 1624   Closure None 01/08/25 1624   Base moist;red 01/08/25 1624   Red (%), Wound Tissue Color 100 01/08/25 1624   Periwound dry;pink;maroon/purple 01/08/25 1624   Periwound Temperature warm 01/08/25 1624   Periwound Skin Turgor soft 01/08/25 1624   Edges open 01/08/25 1624   Drainage Characteristics/Odor serosanguineous 01/08/25 1624   Drainage Amount scant 01/08/25 1624   Care, Wound cleansed with;sterile normal saline 01/08/25 1624   Dressing Care dressing applied;non-adherent;petroleum-based;abdominal pad;gauze;tubular wrap;elastic bandage 01/08/25 1624   Periwound Care absorptive dressing applied 01/08/25 1624       Wound 01/01/25 1800 Left anterior thigh skin tear (Active)   Wound Image   01/08/25 1624   Dressing Appearance dry;intact 01/08/25 1624   Closure None 01/08/25 1624   Base moist;pink 01/08/25 1624   Periwound dry;pink 01/08/25 1624   Periwound Skin Turgor soft 01/08/25 1624   Edges open 01/08/25 1624   Drainage Characteristics/Odor serosanguineous 01/08/25 1624   Drainage Amount scant 01/08/25 1624   Care, Wound cleansed with;sterile normal saline 01/08/25 1624   Dressing Care dressing removed;dressing applied;silver impregnated;hydrofiber;silicone border foam 01/08/25 1624   Periwound Care absorptive dressing applied 01/08/25 1624        Wound Check / Follow-up:  Patient seen today for wound follow up. Patient is awake, alert, and oriented. Primary RN had leg wraps removed, legs washed, and Aquaphor applied prior to assessment. Patient refuses to have heels elevated off of bed due to pressure and discomfort  placed on joints. Patient is on a specialty bed. Patient refused to have gluteal aspects assessed at this time.     Ulcerations noted to right anterior and lateral lower leg within area of maroon/tan hyperpigmentation. Wound bases are moist and red. Increase in serosanguineous drainage noted to anterior aspects. Drainage controlled by cleansing with normal saline and gauze.  Deep tissue pressure injury noted to anterior aspect of right ankle. Wound base is closed with non-blanchable maroon/purple coloration. Periwound tissue is dry with pink coloration.  Traumatic injury noted to left anterior thigh. Wound base is moist and pink. Periwound tissue is dry with pink coloration.  Traumatic injury noted to distal aspect of residual right foot. Wound base presents with moist red and pink tissue, along with dry tan tissue. Periwound tissue is dry and pink.  Stage 2 pressure injury to right posterior ankle. Wound base is partially covered with dry tan tissue with moist pink tissue visible. Periwound tissue is pink and dry.   Cleansed all areas with normal saline and gauze, blotted dry.  Applied silver impregnated hydrofiber moistened with normal saline to right residual foot and right posterior ankle wound bases. Applied silver impregnated hydrofiber to anterior right lower leg ulcerations and left anterior thigh. Applied non-adherent, petroleum-based gauze to lateral right lower leg and right anterior ankle. Covered left thigh wound with mini silicone border dressing. Covered lower leg/foot dressings with ABD pad and gauze pads as patient is requesting to not have silicone border dressings applied to lower legs/foot. Secured with gauze roll and elastic bandages.  Recommending daily dressing changes with silver impregnated hydrofiber to all areas. Cover right leg wounds with ABD pad/gauze pads. Secure with gauze roll and elastic bandages from base of foot to bend of knee for edema management. Cover left thigh with mini  silicone border dressing.     Recommending to maintain compression to left lower leg with gauze roll and elastic bandage from base of toes to bend of knee.    Recommending to maintain quality skin care and hygiene with application of cornstarch powder to folds and blue top moisture barrier to gluteal aspects, groin and scrotum. Maintain every two hour turns and offloading at all times. Keep patient clean, dry and free from all moisture.     Traumatic injury to left lower arm presents with dry red/pink tissue. Periwound tissue is dry with pink coloration/ecchymosis. Recommending area to be left MIRTHA.     Impression: Ulcerations to right lower leg. Traumatic injuries to right residual foot, left thigh, and left arm. DTPI to right anterior ankle. Stage 2 pressure injury to right posterior ankle.  BLE edema.     Short term goals:  Regain skin integrity, skin protection, moisture prevention, pressure reduction, quality skin care and hygiene, daily dressing changes, edema management.    Greer North RN    1/8/2025    19:58 EST

## 2025-01-10 ENCOUNTER — APPOINTMENT (OUTPATIENT)
Dept: CT IMAGING | Facility: HOSPITAL | Age: 65
DRG: 291 | End: 2025-01-10
Payer: MEDICARE

## 2025-01-10 LAB
ANION GAP SERPL CALCULATED.3IONS-SCNC: 8.2 MMOL/L (ref 5–15)
ARTERIAL PATENCY WRIST A: POSITIVE
ATMOSPHERIC PRESS: 744.2 MMHG
BASE EXCESS BLDA CALC-SCNC: 2.4 MMOL/L (ref -2–2)
BDY SITE: ABNORMAL
BUN SERPL-MCNC: 6 MG/DL (ref 8–23)
BUN/CREAT SERPL: 13.6 (ref 7–25)
CA-I BLDA-SCNC: 1.14 MMOL/L (ref 1.13–1.32)
CALCIUM SPEC-SCNC: 8 MG/DL (ref 8.6–10.5)
CHLORIDE BLDA-SCNC: 99 MMOL/L (ref 98–107)
CHLORIDE SERPL-SCNC: 100 MMOL/L (ref 98–107)
CO2 SERPL-SCNC: 25.8 MMOL/L (ref 22–29)
CREAT SERPL-MCNC: 0.44 MG/DL (ref 0.76–1.27)
D-LACTATE SERPL-SCNC: 1.2 MMOL/L
DEPRECATED RDW RBC AUTO: 54.8 FL (ref 37–54)
EGFRCR SERPLBLD CKD-EPI 2021: 116.9 ML/MIN/1.73
ERYTHROCYTE [DISTWIDTH] IN BLOOD BY AUTOMATED COUNT: 22.5 % (ref 12.3–15.4)
GLUCOSE BLDC GLUCOMTR-MCNC: 131 MG/DL (ref 70–99)
GLUCOSE BLDC GLUCOMTR-MCNC: 131 MG/DL (ref 70–99)
GLUCOSE BLDC GLUCOMTR-MCNC: 140 MG/DL (ref 70–99)
GLUCOSE BLDC GLUCOMTR-MCNC: 151 MG/DL (ref 70–99)
GLUCOSE BLDC GLUCOMTR-MCNC: 151 MG/DL (ref 70–99)
GLUCOSE SERPL-MCNC: 132 MG/DL (ref 65–99)
HCO3 BLDA-SCNC: 25.9 MMOL/L (ref 22–26)
HCT VFR BLD AUTO: 35.9 % (ref 37.5–51)
HCT VFR BLD CALC: 35 % (ref 38–51)
HEMODILUTION: NO
HGB BLD-MCNC: 10.2 G/DL (ref 13–17.7)
HGB BLDA-MCNC: 12 G/DL (ref 12–18)
INHALED O2 CONCENTRATION: 21 %
MAGNESIUM SERPL-MCNC: 2.1 MG/DL (ref 1.6–2.4)
MCH RBC QN AUTO: 20 PG (ref 26.6–33)
MCHC RBC AUTO-ENTMCNC: 28.4 G/DL (ref 31.5–35.7)
MCV RBC AUTO: 70.5 FL (ref 79–97)
MODALITY: ABNORMAL
PCO2 BLDA: 35.4 MM HG (ref 35–45)
PH BLDA: 7.47 PH UNITS (ref 7.35–7.45)
PLATELET # BLD AUTO: 190 10*3/MM3 (ref 140–450)
PO2 BLD: 323 MM[HG] (ref 0–500)
PO2 BLDA: 67.8 MM HG (ref 80–100)
POTASSIUM BLDA-SCNC: 3.4 MMOL/L (ref 3.5–5)
POTASSIUM SERPL-SCNC: 3.2 MMOL/L (ref 3.5–5.2)
RBC # BLD AUTO: 5.09 10*6/MM3 (ref 4.14–5.8)
SAO2 % BLDCOA: 94.5 % (ref 95–99)
SODIUM BLD-SCNC: 138 MMOL/L (ref 131–143)
SODIUM SERPL-SCNC: 134 MMOL/L (ref 136–145)
WBC NRBC COR # BLD AUTO: 4.99 10*3/MM3 (ref 3.4–10.8)

## 2025-01-10 PROCEDURE — 85027 COMPLETE CBC AUTOMATED: CPT | Performed by: INTERNAL MEDICINE

## 2025-01-10 PROCEDURE — 82803 BLOOD GASES ANY COMBINATION: CPT

## 2025-01-10 PROCEDURE — 70450 CT HEAD/BRAIN W/O DYE: CPT

## 2025-01-10 PROCEDURE — 80051 ELECTROLYTE PANEL: CPT

## 2025-01-10 PROCEDURE — 83605 ASSAY OF LACTIC ACID: CPT

## 2025-01-10 PROCEDURE — 80048 BASIC METABOLIC PNL TOTAL CA: CPT | Performed by: INTERNAL MEDICINE

## 2025-01-10 PROCEDURE — 82330 ASSAY OF CALCIUM: CPT

## 2025-01-10 PROCEDURE — 99232 SBSQ HOSP IP/OBS MODERATE 35: CPT | Performed by: INTERNAL MEDICINE

## 2025-01-10 PROCEDURE — 83735 ASSAY OF MAGNESIUM: CPT | Performed by: INTERNAL MEDICINE

## 2025-01-10 PROCEDURE — 63710000001 INSULIN LISPRO (HUMAN) PER 5 UNITS: Performed by: HOSPITALIST

## 2025-01-10 PROCEDURE — 82948 REAGENT STRIP/BLOOD GLUCOSE: CPT

## 2025-01-10 PROCEDURE — 36600 WITHDRAWAL OF ARTERIAL BLOOD: CPT

## 2025-01-10 PROCEDURE — 82948 REAGENT STRIP/BLOOD GLUCOSE: CPT | Performed by: HOSPITALIST

## 2025-01-10 PROCEDURE — 63710000001 INSULIN GLARGINE PER 5 UNITS: Performed by: FAMILY MEDICINE

## 2025-01-10 RX ORDER — POTASSIUM CHLORIDE 750 MG/1
40 CAPSULE, EXTENDED RELEASE ORAL EVERY 4 HOURS
Status: COMPLETED | OUTPATIENT
Start: 2025-01-10 | End: 2025-01-10

## 2025-01-10 RX ADMIN — SENNOSIDES AND DOCUSATE SODIUM 2 TABLET: 50; 8.6 TABLET ORAL at 09:12

## 2025-01-10 RX ADMIN — WHITE PETROLATUM 1 APPLICATION: 1.75 OINTMENT TOPICAL at 12:03

## 2025-01-10 RX ADMIN — POTASSIUM CHLORIDE 40 MEQ: 750 CAPSULE, EXTENDED RELEASE ORAL at 09:12

## 2025-01-10 RX ADMIN — FERROUS SULFATE TAB 325 MG (65 MG ELEMENTAL FE) 325 MG: 325 (65 FE) TAB at 09:12

## 2025-01-10 RX ADMIN — ATORVASTATIN CALCIUM 10 MG: 10 TABLET, FILM COATED ORAL at 21:15

## 2025-01-10 RX ADMIN — INSULIN LISPRO 2 UNITS: 100 INJECTION, SOLUTION INTRAVENOUS; SUBCUTANEOUS at 09:14

## 2025-01-10 RX ADMIN — POTASSIUM CHLORIDE 40 MEQ: 750 CAPSULE, EXTENDED RELEASE ORAL at 12:34

## 2025-01-10 RX ADMIN — DIGOXIN 125 MCG: 250 TABLET ORAL at 12:34

## 2025-01-10 RX ADMIN — Medication 800 MG: at 09:12

## 2025-01-10 RX ADMIN — Medication 10 ML: at 21:15

## 2025-01-10 RX ADMIN — FUROSEMIDE 60 MG: 20 TABLET ORAL at 09:12

## 2025-01-10 RX ADMIN — Medication 10 ML: at 09:14

## 2025-01-10 RX ADMIN — SERTRALINE 50 MG: 50 TABLET, FILM COATED ORAL at 21:16

## 2025-01-10 RX ADMIN — OXYCODONE 5 MG: 5 TABLET ORAL at 06:38

## 2025-01-10 RX ADMIN — FUROSEMIDE 60 MG: 20 TABLET ORAL at 18:07

## 2025-01-10 RX ADMIN — APIXABAN 5 MG: 5 TABLET, FILM COATED ORAL at 09:12

## 2025-01-10 RX ADMIN — Medication 800 MG: at 21:16

## 2025-01-10 RX ADMIN — CYCLOBENZAPRINE HYDROCHLORIDE 10 MG: 10 TABLET, FILM COATED ORAL at 06:37

## 2025-01-10 RX ADMIN — METOPROLOL SUCCINATE 12.5 MG: 25 TABLET, EXTENDED RELEASE ORAL at 09:12

## 2025-01-10 RX ADMIN — INSULIN GLARGINE 13 UNITS: 100 INJECTION, SOLUTION SUBCUTANEOUS at 09:14

## 2025-01-10 RX ADMIN — BUPROPION HYDROCHLORIDE 150 MG: 150 TABLET, EXTENDED RELEASE ORAL at 09:11

## 2025-01-10 RX ADMIN — APIXABAN 5 MG: 5 TABLET, FILM COATED ORAL at 21:15

## 2025-01-10 NOTE — PLAN OF CARE
Goal Outcome Evaluation:              Outcome Evaluation: resident  in precautions for c diff. per Infection prevention needs to remains in iaolation until 48 hrs post loose stool. no c/o pain, makes the informed decision to not allow staff to turn and reposition, explained risk of further skin impairment.

## 2025-01-10 NOTE — PLAN OF CARE
Goal Outcome Evaluation:  Plan of Care Reviewed With: patient        Progress: no change    Outcome Evaluation: A & O x 4, pain managed with medication as ordered. Loose BM's on shift, stool sample sent to lab as ordered on shift. C-diff history without active C-diff. Refusing some turns throughout shift, incontinence noted and allowing minimal turns while cleaning patient. Educated on importance of turns and frequent weight shifting, does not appear interested. Irritability noted during shift. Appears to be sleeping in bed without discomfort being noted. Patient verbalized wanting to go home and possibly doing home health.

## 2025-01-10 NOTE — PROGRESS NOTES
[facility]   Hospitalist Progress Note  Date: 1/10/2025  Patient Name: Jose Shaikh  : 1960  MRN: [id number]  Date of admission: 2024      Subjective   Subjective     Chief Complaint: Follow-up lower extremity swelling    Summary:Jose Shaikh is a 64 y.o. male who presents to the emergency room for worsening lower extremity swelling, shortness of breath, and suicidal ideation. Patient also states that he does not have anybody at home to take care of him. He has chronic leg wounds. He has a history of diabetes atrial fibrillation ulcers. The patient states that he is going to take all his muscle relaxants to kill himself because he cannot go on living like this. He was admitted for further care, psychiatry was consulted. Patient was no longer suicidal after he was admitted to the hospital and bedside sitter was discontinued. He was started on Wellbutrin per psychiatry recommendations. He was diuresed aggressively, King catheter was placed by urology. Wound care following. Can have a voiding trial prior to discharge. Having good urine output with over 25 L of fluid removed since admission.  Unable to go to rehab, unable to have home health agency except at discharge.  When euvolemic, plan to discharge home.    Interval Followup:   Overnight patient with some confusion therefore CT scan of head obtained, no acute abnormalities noted.  This morning on rounds patient appropriately interactive and answering questions.  Patient has been having diarrhea but overnight C. difficile studies were ordered.  Patient has chronic colonization however no active disease from C. difficile.  There are discussions with patient at bedside about availability of bed at a facility in [place], however patient would be required to give up some of his check at least.  Possibility of all of this check.  Patient is asking for more information about how much of his check would be taken away before he is willing to commit to  this.  Patient already asking other questions about possibly discharging home with home health.  Case management will go ahead and start sending out referrals for home health services in the area in anticipation.    Objective   Objective     Vitals:   Temp:  [97.3 °F (36.3 °C)-98.1 °F (36.7 °C)] 97.7 °F (36.5 °C)  Heart Rate:  [89-99] 90  Resp:  [16-22] 20  BP: (100-140)/(58-87) 138/76  Physical Exam   Gen: Obese male, NAD, WDWN, resting in bed  ENT: PERRL, EOMI   CV: RRR no MRG  Pulm: CTAB, no w/r/r  GI: Abd soft, NTND, +bs  Neuro: Moving all extremities spontaneously, globally weak, CN II-XII grossly intact   Psych: A&O*3, normal mood and affect  Skin: Lower extremities wrapped    Result Review    I have personally reviewed these results and agree with these findings:  [x]  Laboratory  LAB RESULTS: Personally reviewed BMP, CBC, magnesium, phosphorus, blood sugars      Lab 01/10/25  0639 01/10/25  0523 01/08/25  0548 01/07/25  0543 01/06/25  0520 01/05/25  0539 01/04/25  0605   WBC  --  4.99 5.73 5.49 5.84 5.22 5.91   HEMOGLOBIN  --  10.2* 10.3* 10.4* 10.0* 9.5* 9.9*   HEMATOCRIT  --  35.9* 35.0* 36.4* 34.8* 33.1* 34.8*   PLATELETS  --  190 134* 130* 127* 131* 140   NEUTROS ABS  --   --  4.06 4.23 4.36 3.43 3.98   IMMATURE GRANS (ABS)  --   --  0.02 0.02 0.01 0.01 0.02   LYMPHS ABS  --   --  0.85 0.54* 0.70 0.91 0.89   MONOS ABS  --   --  0.68 0.60 0.58 0.66 0.84   EOS ABS  --   --  0.08 0.05 0.14 0.15 0.13   MCV  --  70.5* 69.4* 71.4* 70.4* 70.6* 70.3*   LACTATE 1.2  --   --   --   --   --   --          Lab 01/10/25  0523 01/08/25  0548 01/07/25  0543 01/06/25  0520 01/05/25  0539 01/04/25  0605   SODIUM 134* 133* 128* 132* 133* 134*   POTASSIUM 3.2* 2.9* 3.6 3.8 3.8 4.0   CHLORIDE 100 98 95* 99 99 100   CO2 25.8 26.2 20.9* 25.1 27.4 26.4   ANION GAP 8.2 8.8 12.1 7.9 6.6 7.6   BUN 6* 9 11 13 11 10   CREATININE 0.44* 0.49* 0.60* 0.61* 0.56* 0.43*   EGFR 116.9 113.2 106.5 105.9 108.7 117.7   GLUCOSE 132* 100* 116*  139* 195* 140*   CALCIUM 8.0* 7.8* 8.1* 8.1* 8.2* 8.3*   MAGNESIUM 2.1 2.1 2.2 1.9 2.0 2.0   PHOSPHORUS  --  2.6 2.7 3.0 3.3 3.3                         Lab 01/10/25  0639   PH, ARTERIAL 7.472*   PCO2, ARTERIAL 35.4   PO2 ART 67.8*   O2 SATURATION ART 94.5*   FIO2 21   HCO3 ART 25.9   BASE EXCESS ART 2.4*     Brief Urine Lab Results  (Last result in the past 365 days)        Color   Clarity   Blood   Leuk Est   Nitrite   Protein   CREAT   Urine HCG        12/15/24 1907 Dark Yellow   Cloudy   Negative   Negative   Negative   Trace                 Microbiology Results (last 10 days)       Procedure Component Value - Date/Time    Clostridioides difficile Toxin, PCR - Stool, Per Rectum [[id number]]  (Abnormal) Collected: 01/09/25 2025    Lab Status: Final result Specimen: Stool from Per Rectum Updated: 01/09/25 2220     Toxigenic C. difficile by PCR Positive     027 Toxin Presumptive Negative    Narrative:      DNA from a toxigenic strain of C.difficile has been detected. Antigen testing for the presence of free C.difficile toxin is currently in progress, to help determine the clinical significance of this PCR result.     Clostridioides difficile toxin Ag, Reflex - Stool, Per Rectum [[id number]]  (Normal) Collected: 01/09/25 2025    Lab Status: Final result Specimen: Stool from Per Rectum Updated: 01/09/25 2218     C.diff Toxin Ag Negative    Narrative:      DNA from a toxigenic strain of C.difficile was detected, although the free toxin itself was not detected. These findings are consistent with C.difficile colonization and may not reflect actual C.difficile infection. Clinical correlation needed.            []  Microbiology  []  Radiology  CT Head Without Contrast    Result Date: 1/10/2025  Senescent changes without acute abnormality.    Electronically Signed By-Dr. Los Harvey MD On:1/10/2025 6:08 AM       [x]  EKG/Telemetry   []  Cardiology/Vascular   []  Pathology  []  Old records  [x]  Other:  Scheduled  Meds:apixaban, 5 mg, Oral, Q12H  atorvastatin, 10 mg, Oral, Nightly  buPROPion XL, 150 mg, Oral, Daily  digoxin, 125 mcg, Oral, Daily  ferrous sulfate, 325 mg, Oral, Daily With Breakfast  furosemide, 60 mg, Oral, BID Diuretics  insulin glargine, 13 Units, Subcutaneous, Daily  insulin lispro, 2-9 Units, Subcutaneous, 4x Daily AC & at Bedtime  Lidocaine, 3 patch, Transdermal, Q24H  magnesium oxide, 800 mg, Oral, BID  metoprolol succinate XL, 12.5 mg, Oral, Q24H  mineral oil-hydrophilic petrolatum, 1 Application, Topical, Daily  senna-docusate sodium, 2 tablet, Oral, BID  sertraline, 50 mg, Oral, Nightly  sodium chloride, 10 mL, Intravenous, Q12H      Continuous Infusions:   PRN Meds:.  acetaminophen **OR** acetaminophen **OR** acetaminophen    senna-docusate sodium **AND** polyethylene glycol **AND** bisacodyl **AND** bisacodyl    calcium carbonate    cyclobenzaprine    dextrose    dextrose    glucagon (human recombinant)    loperamide    melatonin    ondansetron ODT **OR** ondansetron    oxyCODONE    sodium chloride    sodium chloride    traMADol      Assessment & Plan   Assessment / Plan   Acute on chronic diastolic congestive heart failure with acute exacerbation  Suicidal ideation  Chronic bilateral foot wounds  Paroxysmal atrial fibrillation  Type 2 diabetes mellitus  Morbid obesity with BMI of 52  Debility with wheelchair dependence  Osteoarthritis of the hip  Chronic venous stasis  Deconditioning  Hypomagnesemia  Buried penis  Hypophosphatemia  Hypokalemia  Hemorrhoids  Anasarca  Urinary retention  Microcytic anemia    Continue to monitor in the hospital for workup and management of the above  Continue fluid restriction  Diuretics have now been transitioned to oral, continue to monitor fluid status daily to ensure this is appropriate oral dose  Increased dose of oral Lasix, continue to monitor weights  Continue strict I's and O's  Voiding trial successfully completed, keep King catheter out now and at  discharge  Overnight some question of confusion therefore CT head obtained, no acute abnormalities.  On rounds patient answering questions appropriately  Patient with diarrhea, discontinuing scheduled bowel regiment.  Patient has chronic colonization of C. difficile however no active disease.  Continue sertraline and bupropion  Continue wound care per nursing  Continue metoprolol, digoxin, Eliquis  Continue Lantus and sliding scale insulin, Blood sugars acceptable  Continue diabetic heart healthy diet  Continue oral analgesics as needed for pain control  Patient received iron replacement during current hospitalization  Continue physical therapy Occupational Therapy  Wound care continues to follow along  Continue to work on appropriate disposition for patient  Trend renal function and electrolytes with a.m. BMP, magnesium   Trend Hgb and WBC with a.m. CBC  Continue to work on appropriate disposition for patient, difficult situation given patient's past.  Continue to work with case management and hospital administration about appropriate placement or plan.  Patient apparently has bed in [place] available to him has long-term placement if he is willing, patient still has many questions about this facility.  His main concern appears to be the amount of his check he would have to give up each month to live at this facility.    Discussed case with: Bedside RN, case management    VTE Prophylaxis:  Pharmacologic & mechanical VTE prophylaxis orders are present.        CODE STATUS:   Level Of Support Discussed With: Patient  Code Status (Patient has no pulse and is not breathing): CPR (Attempt to Resuscitate)  Medical Interventions (Patient has pulse or is breathing): Full Support

## 2025-01-10 NOTE — CONSULTS
"Nutrition Services    Patient Name: Jose Shaikh  YOB: 1960  MRN: [id number]  Admission date: 12/18/2024      CLINICAL NUTRITION ASSESSMENT      Reason for Assessment  Follow Up     H&P:  Past Medical History:   Diagnosis Date    Absence of toe of right foot     Acute osteomyelitis of left calcaneus  08/18/2021    Anxiety and depression     Arthritis     Cancer     Chronic pain     STATES HIS PAIN IS 10/10 AAT    Claustrophobia     Corns and callus     Diabetic ulcer of left heel associated with type 2 DM 08/18/2021    Diabetic ulcer of left heel associated with type 2 DM 07/06/2021    Diabetic ulcer of right midfoot associated with type 2 DM 08/18/2021    Difficulty walking     Essential hypertension 08/31/2021    Hammertoe     Hyperlipidemia LDL goal <100 08/31/2021    Ingrown toenail     Obesity     Paroxysmal atrial fibrillation 08/31/2021    Polyneuropathy     Pressure ulcer, stage 1     Tinea unguium     Type 2 diabetes mellitus with polyneuropathy         Current Problems:   Active Hospital Problems    Diagnosis     **CHF exacerbation         Nutrition/Diet History         Narrative   Pt reports good appetite and is tolerating his diet without complaints offered. Double protein provided at meals, as pt does not like supplements. Pt is non compliant with fluid restriction. Staff continues to encourage and reinforce education. Continue nutrition interventions and RD to follow per protocol.      Anthropometrics        Current Height, Weight Height: 188 cm (74\")  Weight: (!) 198 kg (435 lb 10.1 oz)   Current BMI Body mass index is 55.93 kg/m².   BMI Classification Obese Class III   % % (82.2 kg)    Adjusted Body Weight (ABW) 124.1 kg   Weight Hx  Wt Readings from Last 30 Encounters:   01/10/25 0340 (!) 198 kg (435 lb 10.1 oz)   01/09/25 0305 (!) 198 kg (436 lb 8.2 oz)   01/08/25 0502 (!) 197 kg (434 lb 8.4 oz)   01/07/25 0704 (!) 195 kg (430 lb 1.6 oz)   12/19/24 0202 (!) 187 kg (411 lb " 13.1 oz)   12/18/24 1455 (!) 195 kg (430 lb 12.5 oz)   12/15/24 1912 (!) 199 kg (438 lb 11.5 oz)   12/15/24 1846 (!) 199 kg (439 lb 6 oz)   12/12/24 0730 (!) 175 kg (384 lb 14.8 oz)   12/06/24 2226 (!) 177 kg (389 lb 12.4 oz)   11/30/24 1436 (!) 174 kg (383 lb 9.6 oz)   11/25/24 1600 (!) 166 kg (365 lb 15.4 oz)   11/19/24 1534 (!) 167 kg (369 lb)   11/08/24 1527 (!) 168 kg (369 lb 11.4 oz)   10/26/24 0615 (!) 167 kg (368 lb 2.7 oz)   10/26/24 0609 (!) 155 kg (341 lb 11.4 oz)   10/09/24 0950 (!) 155 kg (341 lb 7.9 oz)   10/02/24 0805 (!) 150 kg (330 lb 0.5 oz)   08/05/24 0732 (!) 158 kg (348 lb 15.8 oz)   07/22/24 0800 (!) 156 kg (344 lb 9.3 oz)   07/08/24 0900 (!) 156 kg (343 lb 14.7 oz)   06/27/24 0745 (!) 156 kg (343 lb 4.1 oz)   05/31/24 1400 (!) 151 kg (333 lb 12.4 oz)   05/22/24 1000 (!) 157 kg (346 lb 2 oz)   05/15/24 0545 (!) 153 kg (338 lb 3 oz)   05/08/24 1100 (!) 154 kg (340 lb 6.2 oz)   05/08/24 1029 (!) 154 kg (340 lb 6.2 oz)   05/01/24 1100 (!) 156 kg (343 lb 14.7 oz)   04/24/24 0900 (!) 159 kg (350 lb 1.5 oz)   04/17/24 1124 (!) 161 kg (355 lb 9.6 oz)   04/11/24 1509 (!) 162 kg (356 lb 11.3 oz)   04/02/24 1118 (!) 157 kg (345 lb 0.3 oz)   03/26/24 1003 (!) 143 kg (314 lb 2.5 oz)   03/18/24 1323 (!) 151 kg (332 lb 3.7 oz)   03/18/24 0500 (!) 171 kg (378 lb 1.4 oz)   03/17/24 0500 (!) 173 kg (380 lb 15.3 oz)   03/16/24 0500 (!) 171 kg (376 lb 15.8 oz)   03/15/24 0500 (!) 161 kg (354 lb 8 oz)   03/14/24 0300 (!) 173 kg (382 lb 4.4 oz)   03/12/24 0500 (!) 158 kg (347 lb 14.2 oz)   03/11/24 0500 (!) 153 kg (337 lb 8.4 oz)   03/10/24 0540 (!) 154 kg (339 lb 4.6 oz)   03/09/24 0500 (!) 153 kg (337 lb 1.3 oz)   03/08/24 1323 (!) 153 kg (337 lb 8 oz)   03/08/24 0500 (!) 157 kg (346 lb 2 oz)   03/06/24 2300 (!) 155 kg (342 lb 2.5 oz)   03/05/24 0415 (!) 155 kg (342 lb 13 oz)   03/04/24 0500 (!) 156 kg (344 lb 9.3 oz)   03/03/24 0500 (!) 156 kg (344 lb 9.3 oz)   03/02/24 0530 (!) 157 kg (346 lb 12.5 oz)    03/01/24 0500 (!) 157 kg (345 lb 3.9 oz)   02/29/24 0500 (!) 157 kg (347 lb 3.6 oz)   02/28/24 0509 (!) 158 kg (347 lb 14.2 oz)   02/27/24 0500 (!) 159 kg (351 lb 3.1 oz)   02/26/24 0500 (!) 159 kg (350 lb 12 oz)   02/25/24 0500 (!) 160 kg (351 lb 10.1 oz)   02/24/24 0500 (!) 160 kg (352 lb 1.2 oz)   02/23/24 0500 (!) 160 kg (353 lb 6.4 oz)   02/22/24 0500 (!) 160 kg (353 lb 13.4 oz)   02/21/24 0514 (!) 162 kg (356 lb 7.7 oz)   02/20/24 0500 (!) 161 kg (355 lb 2.6 oz)   02/19/24 0300 (!) 160 kg (353 lb 6.4 oz)   02/18/24 0500 (!) 162 kg (356 lb 7.7 oz)   02/17/24 0500 (!) 162 kg (357 lb 2.3 oz)   02/16/24 0500 (!) 162 kg (358 lb 0.4 oz)   02/15/24 0532 (!) 163 kg (360 lb 3.7 oz)   02/14/24 0600 (!) 162 kg (357 lb 5.9 oz)   02/13/24 0500 (!) 162 kg (357 lb 9.4 oz)   02/12/24 1352 (!) 160 kg (352 lb 4.7 oz)   02/12/24 0500 (!) 166 kg (367 lb 1.1 oz)   02/11/24 0500 (!) 169 kg (372 lb 2.2 oz)   02/10/24 0500 (!) 168 kg (370 lb 9.5 oz)   02/09/24 0600 (!) 168 kg (370 lb 9.5 oz)   02/08/24 0600 (!) 165 kg (363 lb 15.7 oz)   02/07/24 0600 (!) 166 kg (366 lb 10 oz)   02/06/24 0419 (!) 166 kg (366 lb 10 oz)   02/05/24 0500 (!) 167 kg (367 lb 4.6 oz)   02/04/24 0500 (!) 167 kg (367 lb 8.1 oz)   02/03/24 0500 (!) 166 kg (366 lb 6.5 oz)   02/02/24 0500 (!) 168 kg (369 lb 14.9 oz)   02/01/24 0613 (!) 169 kg (372 lb 5.7 oz)   01/31/24 0500 (!) 168 kg (371 lb 4.1 oz)   01/30/24 0500 (!) 169 kg (372 lb 12.8 oz)   01/29/24 0232 (!) 169 kg (372 lb 5.7 oz)   01/28/24 0500 (!) 167 kg (368 lb 6.2 oz)   01/26/24 0400 (!) 169 kg (372 lb 2.2 oz)   01/25/24 0417 (!) 167 kg (368 lb 9.8 oz)   01/24/24 0608 (!) 168 kg (371 lb 7.6 oz)   01/23/24 0500 (!) 168 kg (369 lb 14.9 oz)   01/22/24 0500 (!) 168 kg (371 lb 4.1 oz)   01/21/24 0500 (!) 168 kg (371 lb 4.1 oz)   01/20/24 0500 (!) 168 kg (371 lb 7.6 oz)   01/19/24 0500 (!) 171 kg (376 lb 1.7 oz)   01/18/24 0500 (!) 172 kg (380 lb 1.2 oz)   01/17/24 0614 (!) 172 kg (379 lb 6.6 oz)   01/16/24  0500 (!) 171 kg (378 lb 1.4 oz)   01/15/24 0500 (!) 169 kg (372 lb 2.2 oz)   01/14/24 0546 (!) 169 kg (373 lb 7.4 oz)   01/13/24 0507 (!) 171 kg (376 lb 5.2 oz)   01/12/24 0600 (!) 170 kg (374 lb 12.5 oz)   01/11/24 0600 (!) 169 kg (371 lb 14.7 oz)   01/10/24 0600 (!) 169 kg (371 lb 11.1 oz)   01/09/24 0500 (!) 168 kg (370 lb 9.5 oz)   01/08/24 0448 (!) 168 kg (370 lb 9.5 oz)   01/07/24 0454 (!) 167 kg (367 lb 15.2 oz)   01/06/24 0500 (!) 166 kg (366 lb 10 oz)   01/05/24 0555 (!) 165 kg (364 lb 6.7 oz)   01/04/24 0500 (!) 165 kg (363 lb 12.1 oz)   01/03/24 0500 (!) 166 kg (365 lb 1.3 oz)   01/02/24 0500 (!) 167 kg (367 lb 4.6 oz)   01/01/24 0500 (!) 166 kg (365 lb 11.9 oz)   12/31/23 0500 (!) 160 kg (352 lb 4.7 oz)   12/30/23 0500 (!) 167 kg (367 lb 15.2 oz)   12/29/23 0500 (!) 166 kg (366 lb 10 oz)   12/28/23 0500 (!) 165 kg (364 lb 13.8 oz)   12/27/23 0500 (!) 166 kg (367 lb 1.1 oz)   12/26/23 0500 (!) 167 kg (367 lb 4.6 oz)   12/25/23 0500 (!) 165 kg (364 lb 3.2 oz)   12/24/23 0500 (!) 164 kg (362 lb 7 oz)   12/23/23 0500 (!) 163 kg (360 lb 0.2 oz)   12/22/23 0600 (!) 163 kg (358 lb 14.5 oz)   12/21/23 0500 (!) 163 kg (359 lb 12.7 oz)   12/20/23 0500 (!) 162 kg (357 lb 9.4 oz)   12/19/23 0550 (!) 163 kg (359 lb 5.6 oz)   12/18/23 0500 (!) 161 kg (355 lb 13.2 oz)   12/17/23 0500 (!) 160 kg (353 lb 13.4 oz)   12/16/23 1541 (!) 160 kg (353 lb 3.2 oz)   12/16/23 0500 (!) 165 kg (364 lb 13.8 oz)   12/15/23 0500 (!) 165 kg (362 lb 10.5 oz)   12/14/23 0500 (!) 157 kg (345 lb 14.4 oz)   12/13/23 0500 (!) 153 kg (337 lb 4.9 oz)   12/12/23 0500 (!) 155 kg (341 lb 0.8 oz)   12/11/23 1049 (!) 155 kg (341 lb 0.8 oz)   12/11/23 0500 (!) 160 kg (353 lb 13.4 oz)   12/10/23 0532 (!) 162 kg (356 lb 7.7 oz)   12/09/23 0500 (!) 151 kg (332 lb 10.8 oz)   12/08/23 0500 (!) 153 kg (336 lb 13.8 oz)   12/06/23 0500 (!) 154 kg (340 lb 6.2 oz)   12/05/23 0607 (!) 161 kg (354 lb 15.1 oz)   12/04/23 0500 (!) 161 kg (354 lb 4.5 oz)   12/03/23  0400 (!) 161 kg (354 lb 11.5 oz)   11/21/23 1155 (!) 162 kg (357 lb 12.8 oz)   11/09/23 0500 (!) 160 kg (353 lb 9.9 oz)   11/06/23 1422 (!) 158 kg (348 lb 5.2 oz)   11/02/23 1155 (!) 158 kg (348 lb 15.8 oz)   09/11/23 0030 (!) 151 kg (334 lb)   09/10/23 1844 (!) 154 kg (338 lb 10 oz)   08/30/23 1112 (!) 157 kg (347 lb)   08/01/23 0932 (!) 158 kg (347 lb 10.7 oz)   07/31/23 2347 (!) 163 kg (358 lb 7.5 oz)   06/16/23 2333 (!) 155 kg (342 lb 2.5 oz)   06/16/23 1053 (!) 155 kg (342 lb 3.2 oz)   06/15/23 0505 (!) 149 kg (328 lb 14.4 oz)   06/14/23 0455 (!) 149 kg (328 lb 4.8 oz)   06/13/23 1703 (!) 149 kg (328 lb 11.2 oz)   06/13/23 0600 (!) 170 kg (374 lb 12.5 oz)   06/12/23 0420 (!) 160 kg (352 lb 11.8 oz)   06/11/23 0447 (!) 150 kg (331 lb 5.6 oz)   06/10/23 0500 (!) 150 kg (330 lb 14.6 oz)   06/09/23 0536 (!) 156 kg (343 lb 7.6 oz)   06/08/23 1826 (!) 156 kg (344 lb 11.2 oz)   06/08/23 0522 (!) 158 kg (348 lb 8.8 oz)   06/07/23 0548 (!) 155 kg (342 lb 9.5 oz)   06/06/23 0515 (!) 155 kg (341 lb 14.9 oz)   06/05/23 0445 (!) 155 kg (341 lb 9.6 oz)   06/05/23 0348 (!) 158 kg (349 lb 3.3 oz)   06/04/23 0555 (!) 157 kg (346 lb 3.2 oz)   06/03/23 0539 (!) 158 kg (349 lb)   06/02/23 0548 (!) 163 kg (359 lb 3.2 oz)   06/01/23 0600 (!) 164 kg (362 lb)   05/31/23 0507 (!) 164 kg (362 lb 4.8 oz)   05/30/23 0635 (!) 164 kg (362 lb 7 oz)   05/29/23 0500 (!) 167 kg (368 lb 2.7 oz)   05/28/23 0600 (!) 167 kg (367 lb 11.6 oz)   05/27/23 0600 (!) 167 kg (369 lb 0.8 oz)   05/26/23 0529 (!) 167 kg (369 lb 3.2 oz)   05/25/23 0600 (!) 168 kg (370 lb 3.2 oz)   05/24/23 0600 (!) 166 kg (366 lb 9.6 oz)   05/22/23 0529 (!) 169 kg (373 lb 7.4 oz)   05/21/23 0600 (!) 169 kg (371 lb 12.8 oz)   05/20/23 0600 (!) 171 kg (376 lb 5.2 oz)   05/19/23 0300 (!) 168 kg (370 lb 14.4 oz)   05/18/23 1912 (!) 168 kg (371 lb 7.6 oz)   05/18/23 0600 (!) 169 kg (371 lb 11.1 oz)   05/16/23 0700 (!) 171 kg (377 lb 4.8 oz)   05/14/23 0500 (!) 171 kg (377 lb 3.3  oz)   05/12/23 1143 (!) 170 kg (375 lb)   05/06/23 0258 (!) 170 kg (375 lb 8 oz)   04/19/23 0909 (!) 163 kg (359 lb)   04/03/23 1906 (!) 168 kg (370 lb)   03/27/23 0938 (!) 170 kg (373 lb 10.9 oz)   03/17/23 1153 (!) 168 kg (370 lb)   01/27/23 1501 (!) 168 kg (370 lb)   12/22/22 1501 (!) 171 kg (376 lb)   11/08/22 1035 (!) 161 kg (355 lb)   10/01/22 1141 (!) 164 kg (360 lb 10.8 oz)   05/18/22 1311 (!) 155 kg (341 lb)   03/24/22 1432 (!) 155 kg (341 lb)   03/02/22 1412 (!) 155 kg (341 lb)   01/12/22 1317 (!) 155 kg (341 lb)          Wt Change Observation      Estimated/Assessed Needs  Estimated Needs based on: Ideal Body Weight       Energy Requirements 25-30 kcal/kg   EST Needs (kcal/day) 4278-9755 kcal        Protein Requirements 1.2-1.5 g.kg   EST Daily Needs (g/day)  g       Fluid Requirements 25-30 ml/kg    Estimated Needs (mL/day) 9146-8999 ml      Labs/Medications         Pertinent Labs Reviewed.   Results from last 7 days   Lab Units 01/10/25  0523 01/08/25  0548 01/07/25  0543   SODIUM mmol/L 134* 133* 128*   POTASSIUM mmol/L 3.2* 2.9* 3.6   CHLORIDE mmol/L 100 98 95*   CO2 mmol/L 25.8 26.2 20.9*   BUN mg/dL 6* 9 11   CREATININE mg/dL 0.44* 0.49* 0.60*   CALCIUM mg/dL 8.0* 7.8* 8.1*   GLUCOSE mg/dL 132* 100* 116*     Results from last 7 days   Lab Units 01/10/25  0523 01/08/25  0548 01/07/25  0543   MAGNESIUM mg/dL 2.1 2.1 2.2   PHOSPHORUS mg/dL  --  2.6 2.7   HEMOGLOBIN g/dL 10.2* 10.3* 10.4*   HEMATOCRIT % 35.9* 35.0* 36.4*     COVID19   Date Value Ref Range Status   10/14/2024 Detected (C) Not Detected - Ref. Range Final     Lab Results   Component Value Date    HGBA1C 4.40 (L) 08/28/2024         Pertinent Medications Reviewed.     Malnutrition Severity Assessment              Nutrition Diagnosis         Nutrition Dx Problem 1 Overweight/Obesity related to  excessive energy intake  as evidenced by  BMI >50     Nutrition Intervention           Current Nutrition Orders & Evaluation of Intake        Current PO Diet Diet: Cardiac, Diabetic, Fluid Restriction (240 mL/tray); Low Sodium (2g); Consistent Carbohydrate; 2000 mL/day; Fluid Consistency: Thin (IDDSI 0)   Supplement Orders Placed This Encounter      DIET MESSAGE Double Protein           Nutrition Intervention/Prescription        Continue diet as tolerated (Cardiac, Diabetic, 2000 ml fluid restriction)  Double protein at meals (prevent further skin breakdown)        Medical Nutrition Therapy/Nutrition Education          Learner     Readiness Patient  Acceptance     Method     Response Explanation  Verbalizes understanding     Monitor/Evaluation        Monitor Per protocol, I&O, PO intake, GI status     Nutrition Discharge Plan         To be determined     Electronically signed by:  Terry Maloney RD  01/10/25 15:24 EST

## 2025-01-11 LAB
ALBUMIN SERPL-MCNC: 3 G/DL (ref 3.5–5.2)
ALBUMIN/GLOB SERPL: 0.9 G/DL
ALP SERPL-CCNC: 140 U/L (ref 39–117)
ALT SERPL W P-5'-P-CCNC: 16 U/L (ref 1–41)
ANION GAP SERPL CALCULATED.3IONS-SCNC: 11 MMOL/L (ref 5–15)
AST SERPL-CCNC: 29 U/L (ref 1–40)
BACTERIA UR QL AUTO: ABNORMAL /HPF
BILIRUB SERPL-MCNC: 1.2 MG/DL (ref 0–1.2)
BILIRUB UR QL STRIP: NEGATIVE
BUN SERPL-MCNC: 7 MG/DL (ref 8–23)
BUN/CREAT SERPL: 13.7 (ref 7–25)
CALCIUM SPEC-SCNC: 8 MG/DL (ref 8.6–10.5)
CHLORIDE SERPL-SCNC: 99 MMOL/L (ref 98–107)
CLARITY UR: ABNORMAL
CO2 SERPL-SCNC: 26 MMOL/L (ref 22–29)
COLOR UR: ABNORMAL
CREAT SERPL-MCNC: 0.51 MG/DL (ref 0.76–1.27)
DEPRECATED RDW RBC AUTO: 55.5 FL (ref 37–54)
EGFRCR SERPLBLD CKD-EPI 2021: 111.8 ML/MIN/1.73
ERYTHROCYTE [DISTWIDTH] IN BLOOD BY AUTOMATED COUNT: 23.1 % (ref 12.3–15.4)
GLOBULIN UR ELPH-MCNC: 3.2 GM/DL
GLUCOSE BLDC GLUCOMTR-MCNC: 110 MG/DL (ref 70–99)
GLUCOSE BLDC GLUCOMTR-MCNC: 135 MG/DL (ref 70–99)
GLUCOSE BLDC GLUCOMTR-MCNC: 135 MG/DL (ref 70–99)
GLUCOSE BLDC GLUCOMTR-MCNC: 187 MG/DL (ref 70–99)
GLUCOSE SERPL-MCNC: 132 MG/DL (ref 65–99)
GLUCOSE UR STRIP-MCNC: NEGATIVE MG/DL
HCT VFR BLD AUTO: 37.6 % (ref 37.5–51)
HGB BLD-MCNC: 11.3 G/DL (ref 13–17.7)
HGB UR QL STRIP.AUTO: ABNORMAL
HYALINE CASTS UR QL AUTO: ABNORMAL /LPF
KETONES UR QL STRIP: ABNORMAL
LEUKOCYTE ESTERASE UR QL STRIP.AUTO: ABNORMAL
MAGNESIUM SERPL-MCNC: 2 MG/DL (ref 1.6–2.4)
MCH RBC QN AUTO: 21 PG (ref 26.6–33)
MCHC RBC AUTO-ENTMCNC: 30.1 G/DL (ref 31.5–35.7)
MCV RBC AUTO: 70 FL (ref 79–97)
NITRITE UR QL STRIP: POSITIVE
PH UR STRIP.AUTO: 8.5 [PH] (ref 5–8)
PHOSPHATE SERPL-MCNC: 2.4 MG/DL (ref 2.5–4.5)
PLATELET # BLD AUTO: 157 10*3/MM3 (ref 140–450)
PMV BLD AUTO: ABNORMAL FL
POTASSIUM SERPL-SCNC: 3.4 MMOL/L (ref 3.5–5.2)
PROT SERPL-MCNC: 6.2 G/DL (ref 6–8.5)
PROT UR QL STRIP: ABNORMAL
RBC # BLD AUTO: 5.37 10*6/MM3 (ref 4.14–5.8)
RBC # UR STRIP: ABNORMAL /HPF
REF LAB TEST METHOD: ABNORMAL
SODIUM SERPL-SCNC: 136 MMOL/L (ref 136–145)
SP GR UR STRIP: 1.02 (ref 1–1.03)
SQUAMOUS #/AREA URNS HPF: ABNORMAL /HPF
UROBILINOGEN UR QL STRIP: ABNORMAL
WBC # UR STRIP: ABNORMAL /HPF
WBC NRBC COR # BLD AUTO: 6.48 10*3/MM3 (ref 3.4–10.8)

## 2025-01-11 PROCEDURE — 85027 COMPLETE CBC AUTOMATED: CPT | Performed by: INTERNAL MEDICINE

## 2025-01-11 PROCEDURE — 83735 ASSAY OF MAGNESIUM: CPT | Performed by: INTERNAL MEDICINE

## 2025-01-11 PROCEDURE — 87186 SC STD MICRODIL/AGAR DIL: CPT | Performed by: PHYSICIAN ASSISTANT

## 2025-01-11 PROCEDURE — 81001 URINALYSIS AUTO W/SCOPE: CPT | Performed by: PHYSICIAN ASSISTANT

## 2025-01-11 PROCEDURE — 87077 CULTURE AEROBIC IDENTIFY: CPT | Performed by: PHYSICIAN ASSISTANT

## 2025-01-11 PROCEDURE — 63710000001 INSULIN LISPRO (HUMAN) PER 5 UNITS: Performed by: HOSPITALIST

## 2025-01-11 PROCEDURE — 84100 ASSAY OF PHOSPHORUS: CPT | Performed by: INTERNAL MEDICINE

## 2025-01-11 PROCEDURE — 25010000002 CEFTRIAXONE PER 250 MG: Performed by: INTERNAL MEDICINE

## 2025-01-11 PROCEDURE — 80053 COMPREHEN METABOLIC PANEL: CPT | Performed by: INTERNAL MEDICINE

## 2025-01-11 PROCEDURE — 82948 REAGENT STRIP/BLOOD GLUCOSE: CPT

## 2025-01-11 PROCEDURE — 82948 REAGENT STRIP/BLOOD GLUCOSE: CPT | Performed by: HOSPITALIST

## 2025-01-11 PROCEDURE — 87086 URINE CULTURE/COLONY COUNT: CPT | Performed by: PHYSICIAN ASSISTANT

## 2025-01-11 PROCEDURE — 63710000001 INSULIN GLARGINE PER 5 UNITS: Performed by: FAMILY MEDICINE

## 2025-01-11 PROCEDURE — 99232 SBSQ HOSP IP/OBS MODERATE 35: CPT | Performed by: INTERNAL MEDICINE

## 2025-01-11 RX ORDER — POTASSIUM CHLORIDE 750 MG/1
40 CAPSULE, EXTENDED RELEASE ORAL DAILY
Status: COMPLETED | OUTPATIENT
Start: 2025-01-11 | End: 2025-01-12

## 2025-01-11 RX ADMIN — DIGOXIN 125 MCG: 250 TABLET ORAL at 13:12

## 2025-01-11 RX ADMIN — POTASSIUM CHLORIDE 40 MEQ: 750 CAPSULE, EXTENDED RELEASE ORAL at 09:26

## 2025-01-11 RX ADMIN — INSULIN LISPRO 2 UNITS: 100 INJECTION, SOLUTION INTRAVENOUS; SUBCUTANEOUS at 13:12

## 2025-01-11 RX ADMIN — APIXABAN 5 MG: 5 TABLET, FILM COATED ORAL at 09:26

## 2025-01-11 RX ADMIN — FUROSEMIDE 60 MG: 20 TABLET ORAL at 09:26

## 2025-01-11 RX ADMIN — WHITE PETROLATUM 1 APPLICATION: 1.75 OINTMENT TOPICAL at 13:14

## 2025-01-11 RX ADMIN — TRAMADOL HYDROCHLORIDE 100 MG: 50 TABLET, COATED ORAL at 09:41

## 2025-01-11 RX ADMIN — Medication 800 MG: at 09:25

## 2025-01-11 RX ADMIN — Medication 800 MG: at 21:13

## 2025-01-11 RX ADMIN — Medication 10 ML: at 21:14

## 2025-01-11 RX ADMIN — FUROSEMIDE 60 MG: 20 TABLET ORAL at 18:10

## 2025-01-11 RX ADMIN — INSULIN GLARGINE 13 UNITS: 100 INJECTION, SOLUTION SUBCUTANEOUS at 09:25

## 2025-01-11 RX ADMIN — FERROUS SULFATE TAB 325 MG (65 MG ELEMENTAL FE) 325 MG: 325 (65 FE) TAB at 09:26

## 2025-01-11 RX ADMIN — APIXABAN 5 MG: 5 TABLET, FILM COATED ORAL at 21:13

## 2025-01-11 RX ADMIN — Medication 10 ML: at 09:28

## 2025-01-11 RX ADMIN — SODIUM CHLORIDE 2000 MG: 9 INJECTION INTRAMUSCULAR; INTRAVENOUS; SUBCUTANEOUS at 13:12

## 2025-01-11 RX ADMIN — BUPROPION HYDROCHLORIDE 150 MG: 150 TABLET, EXTENDED RELEASE ORAL at 09:26

## 2025-01-11 RX ADMIN — SERTRALINE 50 MG: 50 TABLET, FILM COATED ORAL at 21:13

## 2025-01-11 RX ADMIN — METOPROLOL SUCCINATE 12.5 MG: 25 TABLET, EXTENDED RELEASE ORAL at 09:27

## 2025-01-11 RX ADMIN — ATORVASTATIN CALCIUM 10 MG: 10 TABLET, FILM COATED ORAL at 21:13

## 2025-01-11 NOTE — PLAN OF CARE
Goal Outcome Evaluation:  Plan of Care Reviewed With: patient        Progress: no change  Outcome Evaluation: Pt A&Ox2 this shift with hallucinations. MD notified, urinalysis ordered, unable to obtain this shift as pt is incontinent of urine and unable to cath. Frequently cleaned this shift for incontinent stools. C-diff precautions maintained. Blood glucose monitored per order. Frequently repostioned. Call light in reach. No needs at this time.

## 2025-01-11 NOTE — PLAN OF CARE
Goal Outcome Evaluation:  Plan of Care Reviewed With: patient        Progress: declining  Outcome Evaluation: Patient A/Ox3 with confusion and hallucinations. MD aware. Urine collected and sent to lab. Wound care and skin care completed as ordered.Blood glucose monitored. No other issues/needs at this time.

## 2025-01-11 NOTE — PROGRESS NOTES
[facility]   Hospitalist Progress Note  Date: 2025  Patient Name: Jose Shaikh  : 1960  MRN: [id number]  Date of admission: 2024      Subjective   Subjective     Chief Complaint: Follow-up lower extremity swelling    Summary:Jose Shaikh is a 64 y.o. male who presents to the emergency room for worsening lower extremity swelling, shortness of breath, and suicidal ideation. Patient also states that he does not have anybody at home to take care of him. He has chronic leg wounds. He has a history of diabetes atrial fibrillation ulcers. The patient states that he is going to take all his muscle relaxants to kill himself because he cannot go on living like this. He was admitted for further care, psychiatry was consulted. Patient was no longer suicidal after he was admitted to the hospital and bedside sitter was discontinued. He was started on Wellbutrin per psychiatry recommendations. He was diuresed aggressively, King catheter was placed by urology. Wound care following. Can have a voiding trial prior to discharge. Having good urine output with over 25 L of fluid removed since admission.  Unable to go to rehab, unable to have home health agency except at discharge.  When euvolemic, plan for disposition.  Has been difficult given limited options for patient on safe discharge planning.  Patient remains apprehensive to discharge to a long-term care facility at this time, most appropriate option for him.  However, patient fearful of losing his check when discharging to long-term care facility.    Interval Followup:   On rounds yesterday patient awake interactive.  However on rounds this morning patient more agitated.  Confused.  Patient able to answer majority of questions however, is more confused on rounds.  A UA was obtained overnight, concerning for acute urinary tract infection therefore started patient on antibiotics.  Patient with no focal deficits noted on exam, cranial nerves grossly  intact.    Objective   Objective     Vitals:   Temp:  [97.7 °F (36.5 °C)-98.6 °F (37 °C)] 98.4 °F (36.9 °C)  Heart Rate:  [74-96] 96  Resp:  [16-22] 18  BP: (118-140)/(54-68) 138/68  Physical Exam   Gen: Obese male, NAD, confused today, answering majority of questions however with bizarre comments intermittently.  oriented to name, date of birth  ENT: PERRL, EOMI   CV: RRR no MRG  Pulm: CTAB, no w/r/r  GI: Abd soft, NTND, +bs  Neuro: Moving all extremities spontaneously, globally weak, CN II-XII grossly intact   Skin: Lower extremities wrapped    Result Review    I have personally reviewed these results and agree with these findings:  [x]  Laboratory  LAB RESULTS: Personally reviewed BMP, CBC, magnesium, phosphorus, blood sugars      Lab 01/11/25  0554 01/10/25  0639 01/10/25  0523 01/08/25  0548 01/07/25  0543 01/06/25  0520 01/05/25  0539   WBC 6.48  --  4.99 5.73 5.49 5.84 5.22   HEMOGLOBIN 11.3*  --  10.2* 10.3* 10.4* 10.0* 9.5*   HEMATOCRIT 37.6  --  35.9* 35.0* 36.4* 34.8* 33.1*   PLATELETS 157  --  190 134* 130* 127* 131*   NEUTROS ABS  --   --   --  4.06 4.23 4.36 3.43   IMMATURE GRANS (ABS)  --   --   --  0.02 0.02 0.01 0.01   LYMPHS ABS  --   --   --  0.85 0.54* 0.70 0.91   MONOS ABS  --   --   --  0.68 0.60 0.58 0.66   EOS ABS  --   --   --  0.08 0.05 0.14 0.15   MCV 70.0*  --  70.5* 69.4* 71.4* 70.4* 70.6*   LACTATE  --  1.2  --   --   --   --   --          Lab 01/11/25  0554 01/10/25  0523 01/08/25  0548 01/07/25  0543 01/06/25  0520 01/05/25  0539   SODIUM 136 134* 133* 128* 132* 133*   POTASSIUM 3.4* 3.2* 2.9* 3.6 3.8 3.8   CHLORIDE 99 100 98 95* 99 99   CO2 26.0 25.8 26.2 20.9* 25.1 27.4   ANION GAP 11.0 8.2 8.8 12.1 7.9 6.6   BUN 7* 6* 9 11 13 11   CREATININE 0.51* 0.44* 0.49* 0.60* 0.61* 0.56*   EGFR 111.8 116.9 113.2 106.5 105.9 108.7   GLUCOSE 132* 132* 100* 116* 139* 195*   CALCIUM 8.0* 8.0* 7.8* 8.1* 8.1* 8.2*   MAGNESIUM 2.0 2.1 2.1 2.2 1.9 2.0   PHOSPHORUS 2.4*  --  2.6 2.7 3.0 3.3          Lab 01/11/25  0554   TOTAL PROTEIN 6.2   ALBUMIN 3.0*   GLOBULIN 3.2   ALT (SGPT) 16   AST (SGOT) 29   BILIRUBIN 1.2   ALK PHOS 140*                     Lab 01/10/25  0639   PH, ARTERIAL 7.472*   PCO2, ARTERIAL 35.4   PO2 ART 67.8*   O2 SATURATION ART 94.5*   FIO2 21   HCO3 ART 25.9   BASE EXCESS ART 2.4*     Brief Urine Lab Results  (Last result in the past 365 days)        Color   Clarity   Blood   Leuk Est   Nitrite   Protein   CREAT   Urine HCG        01/11/25 0938 Dark Yellow   Turbid   Trace   Large (3+)   Positive   30 mg/dL (1+)                 Microbiology Results (last 10 days)       Procedure Component Value - Date/Time    Clostridioides difficile Toxin, PCR - Stool, Per Rectum [[id number]]  (Abnormal) Collected: 01/09/25 2025    Lab Status: Final result Specimen: Stool from Per Rectum Updated: 01/09/25 2220     Toxigenic C. difficile by PCR Positive     027 Toxin Presumptive Negative    Narrative:      DNA from a toxigenic strain of C.difficile has been detected. Antigen testing for the presence of free C.difficile toxin is currently in progress, to help determine the clinical significance of this PCR result.     Clostridioides difficile toxin Ag, Reflex - Stool, Per Rectum [[id number]]  (Normal) Collected: 01/09/25 2025    Lab Status: Final result Specimen: Stool from Per Rectum Updated: 01/09/25 2218     C.diff Toxin Ag Negative    Narrative:      DNA from a toxigenic strain of C.difficile was detected, although the free toxin itself was not detected. These findings are consistent with C.difficile colonization and may not reflect actual C.difficile infection. Clinical correlation needed.            []  Microbiology  []  Radiology  CT Head Without Contrast    Result Date: 1/10/2025  Senescent changes without acute abnormality.    Electronically Signed By-Dr. Los Harvey MD On:1/10/2025 6:08 AM       [x]  EKG/Telemetry   []  Cardiology/Vascular   []  Pathology  []  Old records  [x]  Other:  Scheduled  Meds:apixaban, 5 mg, Oral, Q12H  atorvastatin, 10 mg, Oral, Nightly  buPROPion XL, 150 mg, Oral, Daily  cefTRIAXone, 2,000 mg, Intravenous, Q24H  digoxin, 125 mcg, Oral, Daily  ferrous sulfate, 325 mg, Oral, Daily With Breakfast  furosemide, 60 mg, Oral, BID Diuretics  insulin glargine, 13 Units, Subcutaneous, Daily  insulin lispro, 2-9 Units, Subcutaneous, 4x Daily AC & at Bedtime  Lidocaine, 3 patch, Transdermal, Q24H  magnesium oxide, 800 mg, Oral, BID  metoprolol succinate XL, 12.5 mg, Oral, Q24H  mineral oil-hydrophilic petrolatum, 1 Application, Topical, Daily  potassium chloride, 40 mEq, Oral, Daily  sertraline, 50 mg, Oral, Nightly  sodium chloride, 10 mL, Intravenous, Q12H      Continuous Infusions:   PRN Meds:.  acetaminophen **OR** acetaminophen **OR** acetaminophen    calcium carbonate    cyclobenzaprine    dextrose    dextrose    glucagon (human recombinant)    loperamide    melatonin    ondansetron ODT **OR** ondansetron    oxyCODONE    sodium chloride    sodium chloride    traMADol      Assessment & Plan   Assessment / Plan   Delirium  Urinary tract infection  Acute on chronic diastolic congestive heart failure with acute exacerbation  Suicidal ideation  Chronic bilateral foot wounds  Paroxysmal atrial fibrillation  Type 2 diabetes mellitus  Morbid obesity with BMI of 52  Debility with wheelchair dependence  Osteoarthritis of the hip  Chronic venous stasis  Deconditioning  Hypomagnesemia  Buried penis  Hypophosphatemia  Hypokalemia  Hemorrhoids  Anasarca  Urinary retention  Microcytic anemia    Continue to monitor in the hospital for workup and management of the above  Patient noted to be more confused over the previous 2 days.  On rounds yesterday patient able to answer orientation questions appropriately, however on rounds on the 11th, answering majority of questions however more confused.  UA is returned concerning for urinary tract infection therefore started patient on ceftriaxone.  Will continue  to follow urine culture data  Hold sedating medications  Continue fluid restriction  Diuretics have now been transitioned to oral, continue to monitor fluid status daily to ensure this is appropriate oral dose  Increased dose of oral Lasix, continue to monitor weights  Continue strict I's and O's  Voiding trial successfully completed, keep King catheter out now and at discharge  Overnight some question of confusion therefore CT head obtained, no acute abnormalities.  On rounds patient answering questions appropriately  Patient with diarrhea, discontinuing scheduled bowel regiment.  Patient has chronic colonization of C. difficile however no active disease.  Continue sertraline and bupropion  Continue wound care per nursing  Continue metoprolol, digoxin, Eliquis  Continue Lantus and sliding scale insulin, Blood sugars acceptable  Continue diabetic heart healthy diet  Patient received iron replacement during current hospitalization  Continue physical therapy Occupational Therapy  Wound care continues to follow along  Continue to work on appropriate disposition for patient  Trend renal function and electrolytes with a.m. BMP, magnesium   Trend Hgb and WBC with a.m. CBC  Continue to work on appropriate disposition for patient, difficult situation given patient's past.  Continue to work with case management and hospital administration about appropriate placement or plan.  Patient apparently has bed in [place] available to him has long-term placement if he is willing, patient still has many questions about this facility.  His main concern appears to be the amount of his check he would have to give up each month to live at this facility.    Discussed case with: Bedside RN    VTE Prophylaxis:  Pharmacologic & mechanical VTE prophylaxis orders are present.        CODE STATUS:   Level Of Support Discussed With: Patient  Code Status (Patient has no pulse and is not breathing): CPR (Attempt to Resuscitate)  Medical Interventions  (Patient has pulse or is breathing): Full Support

## 2025-01-12 LAB
ALBUMIN SERPL-MCNC: 3.1 G/DL (ref 3.5–5.2)
ALBUMIN/GLOB SERPL: 0.9 G/DL
ALP SERPL-CCNC: 140 U/L (ref 39–117)
ALT SERPL W P-5'-P-CCNC: 17 U/L (ref 1–41)
ANION GAP SERPL CALCULATED.3IONS-SCNC: 10.3 MMOL/L (ref 5–15)
AST SERPL-CCNC: 35 U/L (ref 1–40)
BASOPHILS # BLD AUTO: 0.07 10*3/MM3 (ref 0–0.2)
BASOPHILS NFR BLD AUTO: 0.7 % (ref 0–1.5)
BILIRUB SERPL-MCNC: 1.3 MG/DL (ref 0–1.2)
BUN SERPL-MCNC: 8 MG/DL (ref 8–23)
BUN/CREAT SERPL: 14.5 (ref 7–25)
CALCIUM SPEC-SCNC: 8.4 MG/DL (ref 8.6–10.5)
CHLORIDE SERPL-SCNC: 100 MMOL/L (ref 98–107)
CO2 SERPL-SCNC: 27.7 MMOL/L (ref 22–29)
CREAT SERPL-MCNC: 0.55 MG/DL (ref 0.76–1.27)
DEPRECATED RDW RBC AUTO: 57.4 FL (ref 37–54)
EGFRCR SERPLBLD CKD-EPI 2021: 109.3 ML/MIN/1.73
EOSINOPHIL # BLD AUTO: 0.07 10*3/MM3 (ref 0–0.4)
EOSINOPHIL NFR BLD AUTO: 0.7 % (ref 0.3–6.2)
ERYTHROCYTE [DISTWIDTH] IN BLOOD BY AUTOMATED COUNT: 23.5 % (ref 12.3–15.4)
GLOBULIN UR ELPH-MCNC: 3.5 GM/DL
GLUCOSE BLDC GLUCOMTR-MCNC: 130 MG/DL (ref 70–99)
GLUCOSE BLDC GLUCOMTR-MCNC: 135 MG/DL (ref 70–99)
GLUCOSE BLDC GLUCOMTR-MCNC: 142 MG/DL (ref 70–99)
GLUCOSE BLDC GLUCOMTR-MCNC: 147 MG/DL (ref 70–99)
GLUCOSE SERPL-MCNC: 139 MG/DL (ref 65–99)
HCT VFR BLD AUTO: 39.5 % (ref 37.5–51)
HGB BLD-MCNC: 11.3 G/DL (ref 13–17.7)
IMM GRANULOCYTES # BLD AUTO: 0.05 10*3/MM3 (ref 0–0.05)
IMM GRANULOCYTES NFR BLD AUTO: 0.5 % (ref 0–0.5)
LYMPHOCYTES # BLD AUTO: 0.95 10*3/MM3 (ref 0.7–3.1)
LYMPHOCYTES NFR BLD AUTO: 10 % (ref 19.6–45.3)
MAGNESIUM SERPL-MCNC: 2 MG/DL (ref 1.6–2.4)
MCH RBC QN AUTO: 20.4 PG (ref 26.6–33)
MCHC RBC AUTO-ENTMCNC: 28.6 G/DL (ref 31.5–35.7)
MCV RBC AUTO: 71.2 FL (ref 79–97)
MONOCYTES # BLD AUTO: 0.86 10*3/MM3 (ref 0.1–0.9)
MONOCYTES NFR BLD AUTO: 9.1 % (ref 5–12)
NEUTROPHILS NFR BLD AUTO: 7.48 10*3/MM3 (ref 1.7–7)
NEUTROPHILS NFR BLD AUTO: 79 % (ref 42.7–76)
NRBC BLD AUTO-RTO: 0 /100 WBC (ref 0–0.2)
PHOSPHATE SERPL-MCNC: 2.9 MG/DL (ref 2.5–4.5)
PLATELET # BLD AUTO: 174 10*3/MM3 (ref 140–450)
PMV BLD AUTO: 9.9 FL (ref 6–12)
POTASSIUM SERPL-SCNC: 3.3 MMOL/L (ref 3.5–5.2)
PROT SERPL-MCNC: 6.6 G/DL (ref 6–8.5)
RBC # BLD AUTO: 5.55 10*6/MM3 (ref 4.14–5.8)
SODIUM SERPL-SCNC: 138 MMOL/L (ref 136–145)
WBC NRBC COR # BLD AUTO: 9.48 10*3/MM3 (ref 3.4–10.8)

## 2025-01-12 PROCEDURE — 82948 REAGENT STRIP/BLOOD GLUCOSE: CPT | Performed by: HOSPITALIST

## 2025-01-12 PROCEDURE — 85025 COMPLETE CBC W/AUTO DIFF WBC: CPT | Performed by: INTERNAL MEDICINE

## 2025-01-12 PROCEDURE — 80053 COMPREHEN METABOLIC PANEL: CPT | Performed by: INTERNAL MEDICINE

## 2025-01-12 PROCEDURE — 84100 ASSAY OF PHOSPHORUS: CPT | Performed by: INTERNAL MEDICINE

## 2025-01-12 PROCEDURE — 83735 ASSAY OF MAGNESIUM: CPT | Performed by: INTERNAL MEDICINE

## 2025-01-12 PROCEDURE — 99232 SBSQ HOSP IP/OBS MODERATE 35: CPT | Performed by: INTERNAL MEDICINE

## 2025-01-12 PROCEDURE — 82948 REAGENT STRIP/BLOOD GLUCOSE: CPT

## 2025-01-12 PROCEDURE — 63710000001 INSULIN GLARGINE PER 5 UNITS: Performed by: FAMILY MEDICINE

## 2025-01-12 PROCEDURE — 25010000002 CEFTRIAXONE PER 250 MG: Performed by: INTERNAL MEDICINE

## 2025-01-12 RX ADMIN — Medication 800 MG: at 09:59

## 2025-01-12 RX ADMIN — SERTRALINE 50 MG: 50 TABLET, FILM COATED ORAL at 21:13

## 2025-01-12 RX ADMIN — WHITE PETROLATUM 1 APPLICATION: 1.75 OINTMENT TOPICAL at 10:02

## 2025-01-12 RX ADMIN — Medication 10 ML: at 21:13

## 2025-01-12 RX ADMIN — ATORVASTATIN CALCIUM 10 MG: 10 TABLET, FILM COATED ORAL at 21:13

## 2025-01-12 RX ADMIN — Medication 10 ML: at 10:00

## 2025-01-12 RX ADMIN — DIGOXIN 125 MCG: 250 TABLET ORAL at 12:40

## 2025-01-12 RX ADMIN — BUPROPION HYDROCHLORIDE 150 MG: 150 TABLET, EXTENDED RELEASE ORAL at 09:59

## 2025-01-12 RX ADMIN — APIXABAN 5 MG: 5 TABLET, FILM COATED ORAL at 21:13

## 2025-01-12 RX ADMIN — SODIUM CHLORIDE 2000 MG: 9 INJECTION INTRAMUSCULAR; INTRAVENOUS; SUBCUTANEOUS at 12:42

## 2025-01-12 RX ADMIN — METOPROLOL SUCCINATE 12.5 MG: 25 TABLET, EXTENDED RELEASE ORAL at 09:59

## 2025-01-12 RX ADMIN — Medication 800 MG: at 21:13

## 2025-01-12 RX ADMIN — FUROSEMIDE 60 MG: 20 TABLET ORAL at 09:59

## 2025-01-12 RX ADMIN — FUROSEMIDE 60 MG: 20 TABLET ORAL at 18:43

## 2025-01-12 RX ADMIN — INSULIN GLARGINE 13 UNITS: 100 INJECTION, SOLUTION SUBCUTANEOUS at 10:00

## 2025-01-12 RX ADMIN — FERROUS SULFATE TAB 325 MG (65 MG ELEMENTAL FE) 325 MG: 325 (65 FE) TAB at 09:59

## 2025-01-12 RX ADMIN — APIXABAN 5 MG: 5 TABLET, FILM COATED ORAL at 09:59

## 2025-01-12 RX ADMIN — POTASSIUM CHLORIDE 40 MEQ: 750 CAPSULE, EXTENDED RELEASE ORAL at 09:59

## 2025-01-12 NOTE — PROGRESS NOTES
[facility]   Hospitalist Progress Note  Date: 2025  Patient Name: Jose Shaikh  : 1960  MRN: [id number]  Date of admission: 2024      Subjective   Subjective     Chief Complaint: Follow-up lower extremity swelling    Summary:Jose Shaikh is a 64 y.o. male who presents to the emergency room for worsening lower extremity swelling, shortness of breath, and suicidal ideation. Patient also states that he does not have anybody at home to take care of him. He has chronic leg wounds. He has a history of diabetes atrial fibrillation ulcers. The patient states that he is going to take all his muscle relaxants to kill himself because he cannot go on living like this. He was admitted for further care, psychiatry was consulted. Patient was no longer suicidal after he was admitted to the hospital and bedside sitter was discontinued. He was started on Wellbutrin per psychiatry recommendations. He was diuresed aggressively, King catheter was placed by urology. Wound care following. Can have a voiding trial prior to discharge. Having good urine output with over 25 L of fluid removed since admission.  Unable to go to rehab, unable to have home health agency except at discharge.  When euvolemic, plan for disposition.  Has been difficult given limited options for patient on safe discharge planning.  Patient remains apprehensive to discharge to a long-term care facility at this time, most appropriate option for him.  However, patient fearful of losing his check when discharging to long-term care facility.    Interval Followup:   Patient awake, alert and oriented x 3.  However more agitated and confused on rounds today.  Answering questions intermittently appropriately, agitated and more cantankerous on rounds.  Patient was found to have urinary tract infection yesterday started on antibiotics.  Urine cultures thus far growing >100,000 CFU gram-negative bacilli    Objective   Objective     Vitals:   Temp:   [97.4 °F (36.3 °C)-98.6 °F (37 °C)] 97.5 °F (36.4 °C)  Heart Rate:  [80-97] 96  Resp:  [14-18] 14  BP: (110-132)/(54-78) 114/66  Physical Exam   Gen: Obese male, NAD, able to answer all orientation questions however confused and agitated  ENT: PERRL, EOMI   CV: RRR no MRG  Pulm: CTAB, no w/r/r  GI: Abd soft, NTND, +bs  Neuro: Moving all extremities spontaneously, globally weak, CN II-XII grossly intact   Skin: Lower extremities wrapped    Result Review    I have personally reviewed these results and agree with these findings:  [x]  Laboratory  LAB RESULTS: Personally reviewed BMP, CBC, magnesium, phosphorus, blood sugars      Lab 01/12/25 0601 01/11/25 0554 01/10/25  0639 01/10/25  0523 01/08/25  0548 01/07/25  0543 01/06/25 0520   WBC 9.48 6.48  --  4.99 5.73 5.49 5.84   HEMOGLOBIN 11.3* 11.3*  --  10.2* 10.3* 10.4* 10.0*   HEMATOCRIT 39.5 37.6  --  35.9* 35.0* 36.4* 34.8*   PLATELETS 174 157  --  190 134* 130* 127*   NEUTROS ABS 7.48*  --   --   --  4.06 4.23 4.36   IMMATURE GRANS (ABS) 0.05  --   --   --  0.02 0.02 0.01   LYMPHS ABS 0.95  --   --   --  0.85 0.54* 0.70   MONOS ABS 0.86  --   --   --  0.68 0.60 0.58   EOS ABS 0.07  --   --   --  0.08 0.05 0.14   MCV 71.2* 70.0*  --  70.5* 69.4* 71.4* 70.4*   LACTATE  --   --  1.2  --   --   --   --          Lab 01/12/25 0601 01/11/25  0554 01/10/25  0523 01/08/25  0548 01/07/25  0543 01/06/25  0520   SODIUM 138 136 134* 133* 128* 132*   POTASSIUM 3.3* 3.4* 3.2* 2.9* 3.6 3.8   CHLORIDE 100 99 100 98 95* 99   CO2 27.7 26.0 25.8 26.2 20.9* 25.1   ANION GAP 10.3 11.0 8.2 8.8 12.1 7.9   BUN 8 7* 6* 9 11 13   CREATININE 0.55* 0.51* 0.44* 0.49* 0.60* 0.61*   EGFR 109.3 111.8 116.9 113.2 106.5 105.9   GLUCOSE 139* 132* 132* 100* 116* 139*   CALCIUM 8.4* 8.0* 8.0* 7.8* 8.1* 8.1*   MAGNESIUM 2.0 2.0 2.1 2.1 2.2 1.9   PHOSPHORUS 2.9 2.4*  --  2.6 2.7 3.0         Lab 01/12/25  0601 01/11/25  0554   TOTAL PROTEIN 6.6 6.2   ALBUMIN 3.1* 3.0*   GLOBULIN 3.5 3.2   ALT (SGPT)  17 16   AST (SGOT) 35 29   BILIRUBIN 1.3* 1.2   ALK PHOS 140* 140*                     Lab 01/10/25  0639   PH, ARTERIAL 7.472*   PCO2, ARTERIAL 35.4   PO2 ART 67.8*   O2 SATURATION ART 94.5*   FIO2 21   HCO3 ART 25.9   BASE EXCESS ART 2.4*     Brief Urine Lab Results  (Last result in the past 365 days)        Color   Clarity   Blood   Leuk Est   Nitrite   Protein   CREAT   Urine HCG        01/11/25 0938 Dark Yellow   Turbid   Trace   Large (3+)   Positive   30 mg/dL (1+)                 Microbiology Results (last 10 days)       Procedure Component Value - Date/Time    Urine Culture - Urine, Urine, Random Void [[id number]]  (Abnormal) Collected: 01/11/25 0938    Lab Status: Preliminary result Specimen: Urine, Random Void Updated: 01/12/25 0958     Urine Culture >100,000 CFU/mL Gram Negative Bacilli    Narrative:      Colonization of the urinary tract without infection is common. Treatment is discouraged unless the patient is symptomatic, pregnant, or undergoing an invasive urologic procedure.    Clostridioides difficile Toxin, PCR - Stool, Per Rectum [[id number]]  (Abnormal) Collected: 01/09/25 2025    Lab Status: Final result Specimen: Stool from Per Rectum Updated: 01/09/25 2220     Toxigenic C. difficile by PCR Positive     027 Toxin Presumptive Negative    Narrative:      DNA from a toxigenic strain of C.difficile has been detected. Antigen testing for the presence of free C.difficile toxin is currently in progress, to help determine the clinical significance of this PCR result.     Clostridioides difficile toxin Ag, Reflex - Stool, Per Rectum [[id number]]  (Normal) Collected: 01/09/25 2025    Lab Status: Final result Specimen: Stool from Per Rectum Updated: 01/09/25 2218     C.diff Toxin Ag Negative    Narrative:      DNA from a toxigenic strain of C.difficile was detected, although the free toxin itself was not detected. These findings are consistent with C.difficile colonization and may not reflect actual  C.difficile infection. Clinical correlation needed.            []  Microbiology  []  Radiology  CT Head Without Contrast    Result Date: 1/10/2025  Senescent changes without acute abnormality.    Electronically Signed By-Dr. Los Harvey MD On:1/10/2025 6:08 AM       [x]  EKG/Telemetry   []  Cardiology/Vascular   []  Pathology  []  Old records  [x]  Other:  Scheduled Meds:apixaban, 5 mg, Oral, Q12H  atorvastatin, 10 mg, Oral, Nightly  buPROPion XL, 150 mg, Oral, Daily  cefTRIAXone, 2,000 mg, Intravenous, Q24H  digoxin, 125 mcg, Oral, Daily  ferrous sulfate, 325 mg, Oral, Daily With Breakfast  furosemide, 60 mg, Oral, BID Diuretics  insulin glargine, 13 Units, Subcutaneous, Daily  insulin lispro, 2-9 Units, Subcutaneous, 4x Daily AC & at Bedtime  Lidocaine, 3 patch, Transdermal, Q24H  magnesium oxide, 800 mg, Oral, BID  metoprolol succinate XL, 12.5 mg, Oral, Q24H  mineral oil-hydrophilic petrolatum, 1 Application, Topical, Daily  sertraline, 50 mg, Oral, Nightly  sodium chloride, 10 mL, Intravenous, Q12H      Continuous Infusions:   PRN Meds:.•  acetaminophen **OR** acetaminophen **OR** acetaminophen  •  calcium carbonate  •  [Held by provider] cyclobenzaprine  •  dextrose  •  dextrose  •  glucagon (human recombinant)  •  loperamide  •  melatonin  •  ondansetron ODT **OR** ondansetron  •  sodium chloride  •  sodium chloride  •  [Held by provider] traMADol      Assessment & Plan   Assessment / Plan   Delirium  Urinary tract infection  Acute on chronic diastolic congestive heart failure with acute exacerbation  Suicidal ideation  Chronic bilateral foot wounds  Paroxysmal atrial fibrillation  Type 2 diabetes mellitus  Morbid obesity with BMI of 52  Debility with wheelchair dependence  Osteoarthritis of the hip  Chronic venous stasis  Deconditioning  Hypomagnesemia  Buried penis  Hypophosphatemia  Hypokalemia  Hemorrhoids  Anasarca  Urinary retention  Microcytic anemia    Continue to monitor in the hospital for  workup and management of the above  Treat urinary tract infection with ceftriaxone at this time, urine culture growing greater than 100,000 CFU's gram-negative bacilli.  Continue to monitor cultures, narrow antibiotics as dictated by culture data  Hold all sedating medications  Continue fluid restriction  Diuretics have now been transitioned to oral, continue to monitor fluid status daily to ensure this is appropriate oral dose  Increased dose of oral Lasix, continue to monitor weights  Continue strict I's and O's  Voiding trial successfully completed, keep King catheter out now and at discharge  Overnight some question of confusion therefore CT head obtained, no acute abnormalities.  On rounds patient answering questions appropriately  Patient with diarrhea, discontinuing scheduled bowel regiment.  Patient has chronic colonization of C. difficile however no active disease.  Continue sertraline and bupropion  Continue wound care per nursing  Continue metoprolol, digoxin, Eliquis  Continue Lantus and sliding scale insulin, Blood sugars acceptable  Continue diabetic heart healthy diet  Patient received iron replacement during current hospitalization  Continue physical therapy Occupational Therapy  Wound care continues to follow along  Continue to work on appropriate disposition for patient  Trend renal function and electrolytes with a.m. BMP, magnesium   Trend Hgb and WBC with a.m. CBC  Continue to work on appropriate disposition for patient, difficult situation given patient's past.  Continue to work with case management and hospital administration about appropriate placement or plan.  Patient apparently has bed in [place] available to him has long-term placement if he is willing, patient still has many questions about this facility.  His main concern appears to be the amount of his check he would have to give up each month to live at this facility.    Discussed case with: Bedside RN    VTE Prophylaxis:  Pharmacologic  & mechanical VTE prophylaxis orders are present.        CODE STATUS:   Level Of Support Discussed With: Patient  Code Status (Patient has no pulse and is not breathing): CPR (Attempt to Resuscitate)  Medical Interventions (Patient has pulse or is breathing): Full Support

## 2025-01-12 NOTE — PLAN OF CARE
Goal Outcome Evaluation:  Plan of Care Reviewed With: patient        Progress: declining  Outcome Evaluation: Patient A/Ox3 with confusion and hallucinations. MD aware. Wound care and skin care completed as ordered. Refusing turns at times. Refuses bed alarm. Not eating. No other issues/needs at this time.

## 2025-01-13 ENCOUNTER — APPOINTMENT (OUTPATIENT)
Dept: CT IMAGING | Facility: HOSPITAL | Age: 65
DRG: 291 | End: 2025-01-13
Payer: MEDICARE

## 2025-01-13 ENCOUNTER — APPOINTMENT (OUTPATIENT)
Dept: GENERAL RADIOLOGY | Facility: HOSPITAL | Age: 65
DRG: 291 | End: 2025-01-13
Payer: MEDICARE

## 2025-01-13 LAB
ANION GAP SERPL CALCULATED.3IONS-SCNC: 14.5 MMOL/L (ref 5–15)
ARTERIAL PATENCY WRIST A: POSITIVE
ATMOSPHERIC PRESS: 743.9 MMHG
B PARAPERT DNA SPEC QL NAA+PROBE: NOT DETECTED
B PERT DNA SPEC QL NAA+PROBE: NOT DETECTED
BACTERIA SPEC AEROBE CULT: ABNORMAL
BASE EXCESS BLDA CALC-SCNC: 0.3 MMOL/L (ref -2–2)
BDY SITE: ABNORMAL
BUN SERPL-MCNC: 11 MG/DL (ref 8–23)
BUN/CREAT SERPL: 18.3 (ref 7–25)
C PNEUM DNA NPH QL NAA+NON-PROBE: NOT DETECTED
CA-I BLDA-SCNC: 1.14 MMOL/L (ref 1.13–1.32)
CALCIUM SPEC-SCNC: 8.7 MG/DL (ref 8.6–10.5)
CHLORIDE BLDA-SCNC: 103 MMOL/L (ref 98–107)
CHLORIDE SERPL-SCNC: 100 MMOL/L (ref 98–107)
CO2 SERPL-SCNC: 24.5 MMOL/L (ref 22–29)
CREAT SERPL-MCNC: 0.6 MG/DL (ref 0.76–1.27)
D-LACTATE SERPL-SCNC: 2 MMOL/L
DEPRECATED RDW RBC AUTO: 56.3 FL (ref 37–54)
EGFRCR SERPLBLD CKD-EPI 2021: 106.5 ML/MIN/1.73
ERYTHROCYTE [DISTWIDTH] IN BLOOD BY AUTOMATED COUNT: 23.6 % (ref 12.3–15.4)
FLUAV SUBTYP SPEC NAA+PROBE: NOT DETECTED
FLUBV RNA ISLT QL NAA+PROBE: NOT DETECTED
GLUCOSE BLDC GLUCOMTR-MCNC: 131 MG/DL (ref 70–99)
GLUCOSE BLDC GLUCOMTR-MCNC: 139 MG/DL (ref 70–99)
GLUCOSE SERPL-MCNC: 123 MG/DL (ref 65–99)
HADV DNA SPEC NAA+PROBE: NOT DETECTED
HCO3 BLDA-SCNC: 23.5 MMOL/L (ref 22–26)
HCOV 229E RNA SPEC QL NAA+PROBE: NOT DETECTED
HCOV HKU1 RNA SPEC QL NAA+PROBE: NOT DETECTED
HCOV NL63 RNA SPEC QL NAA+PROBE: NOT DETECTED
HCOV OC43 RNA SPEC QL NAA+PROBE: NOT DETECTED
HCT VFR BLD AUTO: 38.8 % (ref 37.5–51)
HCT VFR BLD CALC: 39 % (ref 38–51)
HEMODILUTION: NO
HGB BLD-MCNC: 11.3 G/DL (ref 13–17.7)
HGB BLDA-MCNC: 13.3 G/DL (ref 12–18)
HMPV RNA NPH QL NAA+NON-PROBE: NOT DETECTED
HPIV1 RNA ISLT QL NAA+PROBE: NOT DETECTED
HPIV2 RNA SPEC QL NAA+PROBE: NOT DETECTED
HPIV3 RNA NPH QL NAA+PROBE: NOT DETECTED
HPIV4 P GENE NPH QL NAA+PROBE: NOT DETECTED
INHALED O2 CONCENTRATION: 21 %
M PNEUMO IGG SER IA-ACNC: NOT DETECTED
MAGNESIUM SERPL-MCNC: 1.9 MG/DL (ref 1.6–2.4)
MCH RBC QN AUTO: 20.5 PG (ref 26.6–33)
MCHC RBC AUTO-ENTMCNC: 29.1 G/DL (ref 31.5–35.7)
MCV RBC AUTO: 70.3 FL (ref 79–97)
MODALITY: ABNORMAL
NT-PROBNP SERPL-MCNC: 287.1 PG/ML (ref 0–900)
PCO2 BLDA: 32.9 MM HG (ref 35–45)
PH BLDA: 7.46 PH UNITS (ref 7.35–7.45)
PHOSPHATE SERPL-MCNC: 3.1 MG/DL (ref 2.5–4.5)
PLATELET # BLD AUTO: 202 10*3/MM3 (ref 140–450)
PMV BLD AUTO: 10.3 FL (ref 6–12)
PO2 BLD: 337 MM[HG] (ref 0–500)
PO2 BLDA: 70.7 MM HG (ref 80–100)
POTASSIUM BLDA-SCNC: 3.5 MMOL/L (ref 3.5–5)
POTASSIUM SERPL-SCNC: 3.5 MMOL/L (ref 3.5–5.2)
PROCALCITONIN SERPL-MCNC: 0.14 NG/ML (ref 0–0.25)
RBC # BLD AUTO: 5.52 10*6/MM3 (ref 4.14–5.8)
RHINOVIRUS RNA SPEC NAA+PROBE: NOT DETECTED
RSV RNA NPH QL NAA+NON-PROBE: NOT DETECTED
SAO2 % BLDCOA: 95.1 % (ref 95–99)
SARS-COV-2 RNA RESP QL NAA+PROBE: NOT DETECTED
SODIUM BLD-SCNC: 142 MMOL/L (ref 131–143)
SODIUM SERPL-SCNC: 139 MMOL/L (ref 136–145)
WBC NRBC COR # BLD AUTO: 13 10*3/MM3 (ref 3.4–10.8)

## 2025-01-13 PROCEDURE — 93005 ELECTROCARDIOGRAM TRACING: CPT | Performed by: PHYSICIAN ASSISTANT

## 2025-01-13 PROCEDURE — 83605 ASSAY OF LACTIC ACID: CPT

## 2025-01-13 PROCEDURE — 25010000002 AMPICILLIN-SULBACTAM PER 1.5 G: Performed by: INTERNAL MEDICINE

## 2025-01-13 PROCEDURE — 82948 REAGENT STRIP/BLOOD GLUCOSE: CPT

## 2025-01-13 PROCEDURE — 92610 EVALUATE SWALLOWING FUNCTION: CPT

## 2025-01-13 PROCEDURE — 85027 COMPLETE CBC AUTOMATED: CPT | Performed by: INTERNAL MEDICINE

## 2025-01-13 PROCEDURE — 80048 BASIC METABOLIC PNL TOTAL CA: CPT | Performed by: INTERNAL MEDICINE

## 2025-01-13 PROCEDURE — 36600 WITHDRAWAL OF ARTERIAL BLOOD: CPT

## 2025-01-13 PROCEDURE — 87040 BLOOD CULTURE FOR BACTERIA: CPT | Performed by: PHYSICIAN ASSISTANT

## 2025-01-13 PROCEDURE — 84100 ASSAY OF PHOSPHORUS: CPT | Performed by: INTERNAL MEDICINE

## 2025-01-13 PROCEDURE — 80051 ELECTROLYTE PANEL: CPT

## 2025-01-13 PROCEDURE — 82948 REAGENT STRIP/BLOOD GLUCOSE: CPT | Performed by: HOSPITALIST

## 2025-01-13 PROCEDURE — 82330 ASSAY OF CALCIUM: CPT

## 2025-01-13 PROCEDURE — 82803 BLOOD GASES ANY COMBINATION: CPT

## 2025-01-13 PROCEDURE — 83735 ASSAY OF MAGNESIUM: CPT | Performed by: INTERNAL MEDICINE

## 2025-01-13 PROCEDURE — 25010000002 FUROSEMIDE PER 20 MG: Performed by: INTERNAL MEDICINE

## 2025-01-13 PROCEDURE — 84145 PROCALCITONIN (PCT): CPT | Performed by: PHYSICIAN ASSISTANT

## 2025-01-13 PROCEDURE — 71045 X-RAY EXAM CHEST 1 VIEW: CPT

## 2025-01-13 PROCEDURE — 0202U NFCT DS 22 TRGT SARS-COV-2: CPT | Performed by: PHYSICIAN ASSISTANT

## 2025-01-13 PROCEDURE — 83880 ASSAY OF NATRIURETIC PEPTIDE: CPT | Performed by: PHYSICIAN ASSISTANT

## 2025-01-13 PROCEDURE — 99233 SBSQ HOSP IP/OBS HIGH 50: CPT | Performed by: INTERNAL MEDICINE

## 2025-01-13 PROCEDURE — 63710000001 INSULIN GLARGINE PER 5 UNITS: Performed by: FAMILY MEDICINE

## 2025-01-13 PROCEDURE — 93010 ELECTROCARDIOGRAM REPORT: CPT | Performed by: INTERNAL MEDICINE

## 2025-01-13 RX ORDER — IBUPROFEN 600 MG/1
1 TABLET ORAL
Status: DISCONTINUED | OUTPATIENT
Start: 2025-01-13 | End: 2025-01-16 | Stop reason: HOSPADM

## 2025-01-13 RX ORDER — FUROSEMIDE 10 MG/ML
40 INJECTION INTRAMUSCULAR; INTRAVENOUS
Status: COMPLETED | OUTPATIENT
Start: 2025-01-13 | End: 2025-01-15

## 2025-01-13 RX ORDER — DEXTROSE MONOHYDRATE 25 G/50ML
25 INJECTION, SOLUTION INTRAVENOUS
Status: DISCONTINUED | OUTPATIENT
Start: 2025-01-13 | End: 2025-01-16 | Stop reason: HOSPADM

## 2025-01-13 RX ORDER — ENOXAPARIN SODIUM 150 MG/ML
0.7 INJECTION SUBCUTANEOUS EVERY 12 HOURS
Status: DISCONTINUED | OUTPATIENT
Start: 2025-01-13 | End: 2025-01-14

## 2025-01-13 RX ORDER — DILTIAZEM HCL IN NACL,ISO-OSM 125 MG/125
5-15 PLASTIC BAG, INJECTION (ML) INTRAVENOUS
Status: DISCONTINUED | OUTPATIENT
Start: 2025-01-13 | End: 2025-01-15

## 2025-01-13 RX ORDER — NICOTINE POLACRILEX 4 MG
15 LOZENGE BUCCAL
Status: DISCONTINUED | OUTPATIENT
Start: 2025-01-13 | End: 2025-01-16 | Stop reason: HOSPADM

## 2025-01-13 RX ADMIN — INSULIN GLARGINE 13 UNITS: 100 INJECTION, SOLUTION SUBCUTANEOUS at 09:53

## 2025-01-13 RX ADMIN — AMPICILLIN AND SULBACTAM 3 G: 2; 1 INJECTION, POWDER, FOR SOLUTION INTRAVENOUS at 18:49

## 2025-01-13 RX ADMIN — Medication 10 ML: at 09:53

## 2025-01-13 RX ADMIN — DILTIAZEM HYDROCHLORIDE 5 MG/HR: 5 INJECTION INTRAVENOUS at 22:30

## 2025-01-13 RX ADMIN — METOPROLOL TARTRATE 5 MG: 1 INJECTION, SOLUTION INTRAVENOUS at 19:56

## 2025-01-13 RX ADMIN — FUROSEMIDE 40 MG: 10 INJECTION, SOLUTION INTRAMUSCULAR; INTRAVENOUS at 12:58

## 2025-01-13 RX ADMIN — Medication 10 ML: at 20:52

## 2025-01-13 RX ADMIN — WHITE PETROLATUM 1 APPLICATION: 1.75 OINTMENT TOPICAL at 09:53

## 2025-01-13 RX ADMIN — AMPICILLIN AND SULBACTAM 3 G: 2; 1 INJECTION, POWDER, FOR SOLUTION INTRAVENOUS at 12:58

## 2025-01-13 RX ADMIN — FUROSEMIDE 40 MG: 10 INJECTION, SOLUTION INTRAMUSCULAR; INTRAVENOUS at 18:49

## 2025-01-13 NOTE — PLAN OF CARE
Goal Outcome Evaluation:  Plan of Care Reviewed With: patient        Progress: declining  Outcome Evaluation: Pt AOx2-3. HUSAM Andrews, was notified about pts ongoing confusion. Stat ABGs and a chest xray were completed. RRT nurse came to see pt as well. He is now on a remote tele monitor and is NPO until speech evaluation. Pts voice continues to be very hoarse and difficult to understand. VSS. Sepsis screen negative. He was placed on 2L of o2 NC to help maintain sats above 90%. No needs or issues noted at this time.

## 2025-01-13 NOTE — CONSULTS
"Nutrition Services    Patient Name: Jose Shaikh  YOB: 1960  MRN: [id number]  Admission date: 12/18/2024      CLINICAL NUTRITION ASSESSMENT      Reason for Assessment  Follow Up     H&P:  Past Medical History:   Diagnosis Date    Absence of toe of right foot     Acute osteomyelitis of left calcaneus  08/18/2021    Anxiety and depression     Arthritis     Cancer     Chronic pain     STATES HIS PAIN IS 10/10 AAT    Claustrophobia     Corns and callus     Diabetic ulcer of left heel associated with type 2 DM 08/18/2021    Diabetic ulcer of left heel associated with type 2 DM 07/06/2021    Diabetic ulcer of right midfoot associated with type 2 DM 08/18/2021    Difficulty walking     Essential hypertension 08/31/2021    Hammertoe     Hyperlipidemia LDL goal <100 08/31/2021    Ingrown toenail     Obesity     Paroxysmal atrial fibrillation 08/31/2021    Polyneuropathy     Pressure ulcer, stage 1     Tinea unguium     Type 2 diabetes mellitus with polyneuropathy         Current Problems:   Active Hospital Problems    Diagnosis     **CHF exacerbation         Nutrition/Diet History         Narrative   Tube feed consult received. Pt had a change in status and is currently NPO, with SLP consult ordered. Cortrak also ordered; Initiate EN as indicated.      Anthropometrics        Current Height, Weight Height: 188 cm (74\")  Weight: (!) 186 kg (410 lb 9.6 oz)   Current BMI Body mass index is 52.72 kg/m².   BMI Classification Obese Class III   % % (82.2 kg)    Adjusted Body Weight (ABW) 124.1 kg   Weight Hx  Wt Readings from Last 30 Encounters:   01/13/25 0431 (!) 186 kg (410 lb 9.6 oz)   01/10/25 0340 (!) 198 kg (435 lb 10.1 oz)   01/09/25 0305 (!) 198 kg (436 lb 8.2 oz)   01/08/25 0502 (!) 197 kg (434 lb 8.4 oz)   01/07/25 0704 (!) 195 kg (430 lb 1.6 oz)   12/19/24 0202 (!) 187 kg (411 lb 13.1 oz)   12/18/24 1455 (!) 195 kg (430 lb 12.5 oz)   12/15/24 1912 (!) 199 kg (438 lb 11.5 oz)   12/15/24 1846 (!) " 199 kg (439 lb 6 oz)   12/12/24 0730 (!) 175 kg (384 lb 14.8 oz)   12/06/24 2226 (!) 177 kg (389 lb 12.4 oz)   11/30/24 1436 (!) 174 kg (383 lb 9.6 oz)   11/25/24 1600 (!) 166 kg (365 lb 15.4 oz)   11/19/24 1534 (!) 167 kg (369 lb)   11/08/24 1527 (!) 168 kg (369 lb 11.4 oz)   10/26/24 0615 (!) 167 kg (368 lb 2.7 oz)   10/26/24 0609 (!) 155 kg (341 lb 11.4 oz)   10/09/24 0950 (!) 155 kg (341 lb 7.9 oz)   10/02/24 0805 (!) 150 kg (330 lb 0.5 oz)   08/05/24 0732 (!) 158 kg (348 lb 15.8 oz)   07/22/24 0800 (!) 156 kg (344 lb 9.3 oz)   07/08/24 0900 (!) 156 kg (343 lb 14.7 oz)   06/27/24 0745 (!) 156 kg (343 lb 4.1 oz)   05/31/24 1400 (!) 151 kg (333 lb 12.4 oz)   05/22/24 1000 (!) 157 kg (346 lb 2 oz)   05/15/24 0545 (!) 153 kg (338 lb 3 oz)   05/08/24 1100 (!) 154 kg (340 lb 6.2 oz)   05/08/24 1029 (!) 154 kg (340 lb 6.2 oz)   05/01/24 1100 (!) 156 kg (343 lb 14.7 oz)   04/24/24 0900 (!) 159 kg (350 lb 1.5 oz)   04/17/24 1124 (!) 161 kg (355 lb 9.6 oz)   04/11/24 1509 (!) 162 kg (356 lb 11.3 oz)   04/02/24 1118 (!) 157 kg (345 lb 0.3 oz)   03/26/24 1003 (!) 143 kg (314 lb 2.5 oz)   03/18/24 1323 (!) 151 kg (332 lb 3.7 oz)   03/18/24 0500 (!) 171 kg (378 lb 1.4 oz)   03/17/24 0500 (!) 173 kg (380 lb 15.3 oz)   03/16/24 0500 (!) 171 kg (376 lb 15.8 oz)   03/15/24 0500 (!) 161 kg (354 lb 8 oz)   03/14/24 0300 (!) 173 kg (382 lb 4.4 oz)   03/12/24 0500 (!) 158 kg (347 lb 14.2 oz)   03/11/24 0500 (!) 153 kg (337 lb 8.4 oz)   03/10/24 0540 (!) 154 kg (339 lb 4.6 oz)   03/09/24 0500 (!) 153 kg (337 lb 1.3 oz)   03/08/24 1323 (!) 153 kg (337 lb 8 oz)   03/08/24 0500 (!) 157 kg (346 lb 2 oz)   03/06/24 2300 (!) 155 kg (342 lb 2.5 oz)   03/05/24 0415 (!) 155 kg (342 lb 13 oz)   03/04/24 0500 (!) 156 kg (344 lb 9.3 oz)   03/03/24 0500 (!) 156 kg (344 lb 9.3 oz)   03/02/24 0530 (!) 157 kg (346 lb 12.5 oz)   03/01/24 0500 (!) 157 kg (345 lb 3.9 oz)   02/29/24 0500 (!) 157 kg (347 lb 3.6 oz)   02/28/24 0509 (!) 158 kg (347 lb 14.2  oz)   02/27/24 0500 (!) 159 kg (351 lb 3.1 oz)   02/26/24 0500 (!) 159 kg (350 lb 12 oz)   02/25/24 0500 (!) 160 kg (351 lb 10.1 oz)   02/24/24 0500 (!) 160 kg (352 lb 1.2 oz)   02/23/24 0500 (!) 160 kg (353 lb 6.4 oz)   02/22/24 0500 (!) 160 kg (353 lb 13.4 oz)   02/21/24 0514 (!) 162 kg (356 lb 7.7 oz)   02/20/24 0500 (!) 161 kg (355 lb 2.6 oz)   02/19/24 0300 (!) 160 kg (353 lb 6.4 oz)   02/18/24 0500 (!) 162 kg (356 lb 7.7 oz)   02/17/24 0500 (!) 162 kg (357 lb 2.3 oz)   02/16/24 0500 (!) 162 kg (358 lb 0.4 oz)   02/15/24 0532 (!) 163 kg (360 lb 3.7 oz)   02/14/24 0600 (!) 162 kg (357 lb 5.9 oz)   02/13/24 0500 (!) 162 kg (357 lb 9.4 oz)   02/12/24 1352 (!) 160 kg (352 lb 4.7 oz)   02/12/24 0500 (!) 166 kg (367 lb 1.1 oz)   02/11/24 0500 (!) 169 kg (372 lb 2.2 oz)   02/10/24 0500 (!) 168 kg (370 lb 9.5 oz)   02/09/24 0600 (!) 168 kg (370 lb 9.5 oz)   02/08/24 0600 (!) 165 kg (363 lb 15.7 oz)   02/07/24 0600 (!) 166 kg (366 lb 10 oz)   02/06/24 0419 (!) 166 kg (366 lb 10 oz)   02/05/24 0500 (!) 167 kg (367 lb 4.6 oz)   02/04/24 0500 (!) 167 kg (367 lb 8.1 oz)   02/03/24 0500 (!) 166 kg (366 lb 6.5 oz)   02/02/24 0500 (!) 168 kg (369 lb 14.9 oz)   02/01/24 0613 (!) 169 kg (372 lb 5.7 oz)   01/31/24 0500 (!) 168 kg (371 lb 4.1 oz)   01/30/24 0500 (!) 169 kg (372 lb 12.8 oz)   01/29/24 0232 (!) 169 kg (372 lb 5.7 oz)   01/28/24 0500 (!) 167 kg (368 lb 6.2 oz)   01/26/24 0400 (!) 169 kg (372 lb 2.2 oz)   01/25/24 0417 (!) 167 kg (368 lb 9.8 oz)   01/24/24 0608 (!) 168 kg (371 lb 7.6 oz)   01/23/24 0500 (!) 168 kg (369 lb 14.9 oz)   01/22/24 0500 (!) 168 kg (371 lb 4.1 oz)   01/21/24 0500 (!) 168 kg (371 lb 4.1 oz)   01/20/24 0500 (!) 168 kg (371 lb 7.6 oz)   01/19/24 0500 (!) 171 kg (376 lb 1.7 oz)   01/18/24 0500 (!) 172 kg (380 lb 1.2 oz)   01/17/24 0614 (!) 172 kg (379 lb 6.6 oz)   01/16/24 0500 (!) 171 kg (378 lb 1.4 oz)   01/15/24 0500 (!) 169 kg (372 lb 2.2 oz)   01/14/24 0546 (!) 169 kg (373 lb 7.4 oz)    01/13/24 0507 (!) 171 kg (376 lb 5.2 oz)   01/12/24 0600 (!) 170 kg (374 lb 12.5 oz)   01/11/24 0600 (!) 169 kg (371 lb 14.7 oz)   01/10/24 0600 (!) 169 kg (371 lb 11.1 oz)   01/09/24 0500 (!) 168 kg (370 lb 9.5 oz)   01/08/24 0448 (!) 168 kg (370 lb 9.5 oz)   01/07/24 0454 (!) 167 kg (367 lb 15.2 oz)   01/06/24 0500 (!) 166 kg (366 lb 10 oz)   01/05/24 0555 (!) 165 kg (364 lb 6.7 oz)   01/04/24 0500 (!) 165 kg (363 lb 12.1 oz)   01/03/24 0500 (!) 166 kg (365 lb 1.3 oz)   01/02/24 0500 (!) 167 kg (367 lb 4.6 oz)   01/01/24 0500 (!) 166 kg (365 lb 11.9 oz)   12/31/23 0500 (!) 160 kg (352 lb 4.7 oz)   12/30/23 0500 (!) 167 kg (367 lb 15.2 oz)   12/29/23 0500 (!) 166 kg (366 lb 10 oz)   12/28/23 0500 (!) 165 kg (364 lb 13.8 oz)   12/27/23 0500 (!) 166 kg (367 lb 1.1 oz)   12/26/23 0500 (!) 167 kg (367 lb 4.6 oz)   12/25/23 0500 (!) 165 kg (364 lb 3.2 oz)   12/24/23 0500 (!) 164 kg (362 lb 7 oz)   12/23/23 0500 (!) 163 kg (360 lb 0.2 oz)   12/22/23 0600 (!) 163 kg (358 lb 14.5 oz)   12/21/23 0500 (!) 163 kg (359 lb 12.7 oz)   12/20/23 0500 (!) 162 kg (357 lb 9.4 oz)   12/19/23 0550 (!) 163 kg (359 lb 5.6 oz)   12/18/23 0500 (!) 161 kg (355 lb 13.2 oz)   12/17/23 0500 (!) 160 kg (353 lb 13.4 oz)   12/16/23 1541 (!) 160 kg (353 lb 3.2 oz)   12/16/23 0500 (!) 165 kg (364 lb 13.8 oz)   12/15/23 0500 (!) 165 kg (362 lb 10.5 oz)   12/14/23 0500 (!) 157 kg (345 lb 14.4 oz)   12/13/23 0500 (!) 153 kg (337 lb 4.9 oz)   12/12/23 0500 (!) 155 kg (341 lb 0.8 oz)   12/11/23 1049 (!) 155 kg (341 lb 0.8 oz)   12/11/23 0500 (!) 160 kg (353 lb 13.4 oz)   12/10/23 0532 (!) 162 kg (356 lb 7.7 oz)   12/09/23 0500 (!) 151 kg (332 lb 10.8 oz)   12/08/23 0500 (!) 153 kg (336 lb 13.8 oz)   12/06/23 0500 (!) 154 kg (340 lb 6.2 oz)   12/05/23 0607 (!) 161 kg (354 lb 15.1 oz)   12/04/23 0500 (!) 161 kg (354 lb 4.5 oz)   12/03/23 0400 (!) 161 kg (354 lb 11.5 oz)   11/21/23 1155 (!) 162 kg (357 lb 12.8 oz)   11/09/23 0500 (!) 160 kg (353 lb 9.9  oz)   11/06/23 1422 (!) 158 kg (348 lb 5.2 oz)   11/02/23 1155 (!) 158 kg (348 lb 15.8 oz)   09/11/23 0030 (!) 151 kg (334 lb)   09/10/23 1844 (!) 154 kg (338 lb 10 oz)   08/30/23 1112 (!) 157 kg (347 lb)   08/01/23 0932 (!) 158 kg (347 lb 10.7 oz)   07/31/23 2347 (!) 163 kg (358 lb 7.5 oz)   06/16/23 2333 (!) 155 kg (342 lb 2.5 oz)   06/16/23 1053 (!) 155 kg (342 lb 3.2 oz)   06/15/23 0505 (!) 149 kg (328 lb 14.4 oz)   06/14/23 0455 (!) 149 kg (328 lb 4.8 oz)   06/13/23 1703 (!) 149 kg (328 lb 11.2 oz)   06/13/23 0600 (!) 170 kg (374 lb 12.5 oz)   06/12/23 0420 (!) 160 kg (352 lb 11.8 oz)   06/11/23 0447 (!) 150 kg (331 lb 5.6 oz)   06/10/23 0500 (!) 150 kg (330 lb 14.6 oz)   06/09/23 0536 (!) 156 kg (343 lb 7.6 oz)   06/08/23 1826 (!) 156 kg (344 lb 11.2 oz)   06/08/23 0522 (!) 158 kg (348 lb 8.8 oz)   06/07/23 0548 (!) 155 kg (342 lb 9.5 oz)   06/06/23 0515 (!) 155 kg (341 lb 14.9 oz)   06/05/23 0445 (!) 155 kg (341 lb 9.6 oz)   06/05/23 0348 (!) 158 kg (349 lb 3.3 oz)   06/04/23 0555 (!) 157 kg (346 lb 3.2 oz)   06/03/23 0539 (!) 158 kg (349 lb)   06/02/23 0548 (!) 163 kg (359 lb 3.2 oz)   06/01/23 0600 (!) 164 kg (362 lb)   05/31/23 0507 (!) 164 kg (362 lb 4.8 oz)   05/30/23 0635 (!) 164 kg (362 lb 7 oz)   05/29/23 0500 (!) 167 kg (368 lb 2.7 oz)   05/28/23 0600 (!) 167 kg (367 lb 11.6 oz)   05/27/23 0600 (!) 167 kg (369 lb 0.8 oz)   05/26/23 0529 (!) 167 kg (369 lb 3.2 oz)   05/25/23 0600 (!) 168 kg (370 lb 3.2 oz)   05/24/23 0600 (!) 166 kg (366 lb 9.6 oz)   05/22/23 0529 (!) 169 kg (373 lb 7.4 oz)   05/21/23 0600 (!) 169 kg (371 lb 12.8 oz)   05/20/23 0600 (!) 171 kg (376 lb 5.2 oz)   05/19/23 0300 (!) 168 kg (370 lb 14.4 oz)   05/18/23 1912 (!) 168 kg (371 lb 7.6 oz)   05/18/23 0600 (!) 169 kg (371 lb 11.1 oz)   05/16/23 0700 (!) 171 kg (377 lb 4.8 oz)   05/14/23 0500 (!) 171 kg (377 lb 3.3 oz)   05/12/23 1143 (!) 170 kg (375 lb)   05/06/23 0258 (!) 170 kg (375 lb 8 oz)   04/19/23 0909 (!) 163 kg (359 lb)    04/03/23 1906 (!) 168 kg (370 lb)   03/27/23 0938 (!) 170 kg (373 lb 10.9 oz)   03/17/23 1153 (!) 168 kg (370 lb)   01/27/23 1501 (!) 168 kg (370 lb)   12/22/22 1501 (!) 171 kg (376 lb)   11/08/22 1035 (!) 161 kg (355 lb)   10/01/22 1141 (!) 164 kg (360 lb 10.8 oz)   05/18/22 1311 (!) 155 kg (341 lb)   03/24/22 1432 (!) 155 kg (341 lb)   03/02/22 1412 (!) 155 kg (341 lb)   01/12/22 1317 (!) 155 kg (341 lb)          Wt Change Observation UBW of 410-435 lbs     Estimated/Assessed Needs  Estimated Needs based on: Ideal Body Weight       Energy Requirements 25-30 kcal/kg   EST Needs (kcal/day) 2071-9651 kcal        Protein Requirements 1.2-1.5 g.kg   EST Daily Needs (g/day)  g       Fluid Requirements 25-30 ml/kg    Estimated Needs (mL/day) 4883-7552 ml      Labs/Medications         Pertinent Labs Reviewed.   Results from last 7 days   Lab Units 01/13/25 0157 01/12/25  0601 01/11/25  0554   SODIUM mmol/L 139 138 136   POTASSIUM mmol/L 3.5 3.3* 3.4*   CHLORIDE mmol/L 100 100 99   CO2 mmol/L 24.5 27.7 26.0   BUN mg/dL 11 8 7*   CREATININE mg/dL 0.60* 0.55* 0.51*   CALCIUM mg/dL 8.7 8.4* 8.0*   BILIRUBIN mg/dL  --  1.3* 1.2   ALK PHOS U/L  --  140* 140*   ALT (SGPT) U/L  --  17 16   AST (SGOT) U/L  --  35 29   GLUCOSE mg/dL 123* 139* 132*     Results from last 7 days   Lab Units 01/13/25 0157 01/12/25  0601 01/11/25  0554   MAGNESIUM mg/dL 1.9 2.0 2.0   PHOSPHORUS mg/dL 3.1 2.9 2.4*   HEMOGLOBIN g/dL 11.3* 11.3* 11.3*   HEMATOCRIT % 38.8 39.5 37.6     COVID19   Date Value Ref Range Status   01/13/2025 Not Detected Not Detected - Ref. Range Final     Lab Results   Component Value Date    HGBA1C 4.40 (L) 08/28/2024         Pertinent Medications Reviewed.     Malnutrition Severity Assessment              Nutrition Diagnosis         Nutrition Dx Problem 1 Overweight/Obesity related to  excessive energy intake  as evidenced by  BMI >50     Nutrition Intervention           Current Nutrition Orders & Evaluation of  Intake       Current PO Diet NPO Diet NPO Type: Strict NPO   Supplement Orders Placed This Encounter      DIET MESSAGE Double Protein      Feeding Tube Insertion - [employer] System           Nutrition Intervention/Prescription        Diabetisource AC @ 60 ml/hr; start at 20 ml/hr and increase by 10 ml q 6 hrs to goal rate    Provides: 1440 ml of formula, 1728 kcal, 86 g protein, 1180 ml of water    Free water flush of 30 ml/hr (720 ml/day)    Prosource BID (100 kcal, 15 g protein per 30 ml)        Medical Nutrition Therapy/Nutrition Education          Learner     Readiness Patient  Acceptance     Method     Response Explanation  Verbalizes understanding     Monitor/Evaluation        Monitor Per protocol, I&O, PO intake, GI status     Nutrition Discharge Plan         To be determined     Electronically signed by:  Terry Maloney RD  01/13/25 10:30 EST

## 2025-01-13 NOTE — PLAN OF CARE
Goal Outcome Evaluation:           Progress: declining  Outcome Evaluation: No acute changes throughout shift, vss, pt refusing interventions throughout shift. wound care performed. patient changed multiple times throughuot shift.

## 2025-01-13 NOTE — THERAPY EVALUATION
Acute Care - Speech Language Pathology   Swallow Initial Evaluation KEO Dominguez     Patient Name: Jose Shaikh  : 1960  MRN: [id number]  Today's Date: 2025               Admit Date: 2024    Visit Dx:     ICD-10-CM ICD-9-CM   1. Hypervolemia, unspecified hypervolemia type  E87.70 276.69   2. Congestive heart failure, unspecified HF chronicity, unspecified heart failure type  I50.9 428.0   3. Suicidal ideation  R45.851 V62.84     Patient Active Problem List   Diagnosis    Diabetic ulcer of left heel associated with type 2 DM    Acute osteomyelitis of left calcaneus     Diabetic ulcer of left heel associated with type 2 DM    Diabetic ulcer of right midfoot associated with type 2 DM    Paroxysmal atrial fibrillation    Essential hypertension    Hyperlipidemia LDL goal <100    Cellulitis and abscess of foot    High alkaline phosphatase level    Osteomyelitis    Onychomycosis    Onychocryptosis    Foot pain, bilateral    Osteomyelitis of foot, right, acute    Cellulitis of right foot    Type 2 diabetes mellitus, with long-term current use of insulin    Class 3 severe obesity due to excess calories with serious comorbidity and body mass index (BMI) of 45.0 to 49.9 in adult    Anxiety disorder, unspecified    Claustrophobia    Dependence on wheelchair    Depression, unspecified    Long term (current) use of anticoagulants    Long term (current) use of oral hypoglycemic drugs    Wound of foot    Ulcer of right foot    Orthostatic hypotension    Other chronic osteomyelitis, right ankle and foot    Personal history of nicotine dependence    Thrombocytopenia, unspecified    Unspecified open wound, right foot, initial encounter    Diabetic foot infection    Subacute osteomyelitis of right foot    Right foot pain    Sepsis    Onychomycosis    Foot pain, left    COVID-19 virus detected    Absence of toe of right foot    Corns and callosity    Disability of walking    Fracture    Limb swelling    Polyneuropathy     Pressure ulcer, stage 1    Shortness of breath    Generalized weakness    Debility    Onychomycosis    Noncompliance of patient with dietary regimen    Morbid obesity    COVID-19 virus infection    COVID-19    CHF exacerbation     Past Medical History:   Diagnosis Date    Absence of toe of right foot     Acute osteomyelitis of left calcaneus  08/18/2021    Anxiety and depression     Arthritis     Cancer     Chronic pain     STATES HIS PAIN IS 10/10 AAT    Claustrophobia     Corns and callus     Diabetic ulcer of left heel associated with type 2 DM 08/18/2021    Diabetic ulcer of left heel associated with type 2 DM 07/06/2021    Diabetic ulcer of right midfoot associated with type 2 DM 08/18/2021    Difficulty walking     Essential hypertension 08/31/2021    Hammertoe     Hyperlipidemia LDL goal <100 08/31/2021    Ingrown toenail     Obesity     Paroxysmal atrial fibrillation 08/31/2021    Polyneuropathy     Pressure ulcer, stage 1     Tinea unguium     Type 2 diabetes mellitus with polyneuropathy      Past Surgical History:   Procedure Laterality Date    CYST REMOVAL      center of back; benign    EYE SURGERY      INCISION AND DRAINAGE ABSCESS      back    INCISION AND DRAINAGE LEG Right 12/10/2021    Procedure: INCISION AND DRAINAGE LOWER EXTREMITY;  Surgeon: Ash Leyva DPM;  Location: [facility];  Service: Podiatry;  Laterality: Right;    OTHER SURGICAL HISTORY      Surgical clips left foot    TOE SURGERY Right     Removal of 5th toe    TRANS METATARSAL AMPUTATION Right 12/02/2021    Procedure: AMPUTATION TRANS METATARSAL;  Surgeon: Ash Leyva DPM;  Location: [facility];  Service: Podiatry;  Laterality: Right;    VASCULAR SURGERY      WRIST SURGERY Left     repair of injury     Inpatient Speech Pathology Dysphagia Evaluation    PAIN SCALE: None noted    PRECAUTIONS/CONTRAINDICATIONS: Enteric     SUSPECTED ABUSE/NEGLECT/EXPLOITATION: None noted    SOCIAL/PSYCHOLOGICAL  NEEDS/BARRIERS: Depression with suicidal ideation    PAST SOCIAL HISTORY: Diminished functional independence    PRIOR FUNCTION:  Dependent for care    PATIENT GOALS/EXPECTATIONS:  Unable to report    HISTORY: This patient is a 64-year-old admitted with the above diagnosis.  Patient decline in function over the past few days when RRT called earlier this morning due to concern for mental status changes.  Patient made n.p.o. and speech therapy consulted.  RN report over the past 1 to 2 days patient has completely lost his voice, had decline in level of alertness has developed a UTI and concern for pneumonia.  Chest x-ray on 1/13/2025 shows bilateral infiltrates.    CURRENT DIET LEVEL: N.p.o.    OBJECTIVE:    TEST ADMINISTERED: Clinical dysphagia evaluation    COGNITION/SAFETY AWARENESS: Awake    BEHAVIORAL OBSERVATIONS: Cooperative    ORAL MOTOR EXAM: Generalized oral motor weakness noted.  Very dry oral mucosa with dried bloody secretions coating his tongue, palate and gumline.  Lips were dry and cracked    VOICE QUALITY: Aphonic    REFLEX EXAM: Weak cough, breathy throat clear    POSTURE: Upright    FEEDING/SWALLOWING FUNCTION: Completed with oral stimulation and ice chips only    CLINICAL OBSERVATIONS: Oral care completed.  Patient attempting to swallow with stimulation with moist swab, limited laryngeal elevation noted.  Increased laryngeal congestion was noted.  Patient with ice chips diminished bolus control with increased laryngeal congestion.  Suctioning required.  No other trials were presented due to risk of aspiration    DYSPHAGIA CRITERIA: Risk of aspiration    FUNCTIONAL ASSESSMENT INSTRUMENT: Patient currently scored a level 1 of 7 on Functional Communication Measures for swallowing indicating a 100% limitation in function.    ASSESSMENT/ PLAN OF CARE:  Pt presents with limitations, noted below, that impede his ability to swallow safely. The skills of a therapist will be required to safely and effectively  implement the following treatment plan to restore maximal level of function.    PROBLEMS:  1.  Dysphagia   LTG 1: (30 days) patient will increase ability to tolerate least restrictive diet and improve functional communication measure for swallowing to a 4 of 7   STG 1a: (14 days) patient will tolerate therapeutic trials of thin, nectar, honey thick liquids without clinical sign or symptom of aspiration with 8 of 10 trials.   STG 1b: (14 days) patient will perform oral motor exercises x 3 sets of 10 per session   STG 1c: (14 days) patient will perform laryngeal strengthening exercises x 3 sets of 10 per session   STG 1d: (14 days) patient will tolerate therapeutic trials of purée consistencies no clinical signs of aspiration with 8 of 10 trials.   TREATMENT: Dysphagia therapy to ensure diet tolerance, advance to least restrictive diet and analyze aspiration risk.    FREQUENCY/DURATION: Daily 5 times a week    REHAB POTENTIAL:  Pt has good rehab potential.  The following limitations may influence improvement/ length of tx medical status.    RECOMMENDATIONS:   1.   DIET: Unable to recommend safe p.o. diet at this time.  Patient could benefit from alternative source of nutrition/hydration until patient can safely resume p.o. intake.    Recommend aggressive oral care and use of mouth moisturizer.        Pt/responsible party agrees with plan of care and has been informed of all alternatives, risks and benefits.    EDUCATION  The patient has been educated in the following areas:   Dysphagia (Swallowing Impairment).        Bree Way, SLP  1/13/2025

## 2025-01-13 NOTE — NURSING NOTE
Pt very confused and hallucinating with eyes rolling in the back of his head, very pale and voice extremely hoarse. Contacted Kay WEINER regarding patient and explained that the Rns on the floor are very concerned about patient status. New set of ABGs, blood cultures, labs, chest xray obtained and flex monitor placed on patient. Per RRT RN Latrice pt placed on 2 L NC d/t ABGs. Pt made npo with speech consult d/t confusion, hoarseness and increased risk for aspiration. VS remain stable at this time.

## 2025-01-13 NOTE — PROGRESS NOTES
[facility]   Hospitalist Progress Note  Date: 2025  Patient Name: Jose Shaikh  : 1960  MRN: [id number]  Date of admission: 2024      Subjective   Subjective     Chief Complaint: Follow-up lower extremity swelling    Summary:Jose Shaikh is a 64 y.o. male who presents to the emergency room for worsening lower extremity swelling, shortness of breath, and suicidal ideation. Patient also states that he does not have anybody at home to take care of him. He has chronic leg wounds. He has a history of diabetes atrial fibrillation ulcers. The patient states that he is going to take all his muscle relaxants to kill himself because he cannot go on living like this. He was admitted for further care, psychiatry was consulted. Patient was no longer suicidal after he was admitted to the hospital and bedside sitter was discontinued. He was started on Wellbutrin per psychiatry recommendations. He was diuresed aggressively, King catheter was placed by urology. Wound care following. Can have a voiding trial prior to discharge. Having good urine output with over 25 L of fluid removed since admission.  Unable to go to rehab, unable to have home health agency except at discharge.  When euvolemic, plan for disposition.  Has been difficult given limited options for patient on safe discharge planning.  Patient remains apprehensive to discharge to a long-term care facility at this time, most appropriate option for him.  However, patient fearful of losing his check when discharging to long-term care facility.  Patient with worsening mental status, repeat UA obtained on the  concerning for urinary tract infection therefore started on antibiotic    Interval Followup:   Overnight worsening mentation.  This morning on rounds patient making good eye contact, able to follow commands, in all 4 extremities, cranial nerves grossly intact.  Patient with soft voice, hoarseness.  Patient reevaluated by speech therapy  today, felt unsafe to safely eat, therefore core track to be placed today.  Given patient's continued mental status changes we will repeat a CT scan today.  Felt more than likely delirium in the setting of infection.  Also raising concerns for aspiration pneumonia.  Patient's antibiotics transition to Unasyn, this will cover Proteus on urine culture as well as pneumonia.  Patient remained stable on room air    Objective   Objective     Vitals:   Temp:  [97.1 °F (36.2 °C)-98.3 °F (36.8 °C)] 97.8 °F (36.6 °C)  Heart Rate:  [] 99  Resp:  [14-24] 24  BP: (100-150)/(60-82) 126/82  Physical Exam   Gen: Obese male, NAD, able to make eye contact, soft voice  ENT: PERRL, EOMI   CV: RRR no MRG  Pulm: Diminished bases  GI: Abd soft, NTND, +bs  Neuro: Moving all extremities spontaneously, globally weak, CN II-XII grossly intact.  Soft/hoarse voice, difficulty in understanding his speech.  However patient appears to be interacting appropriately  Skin: Lower extremities wrapped    Result Review    I have personally reviewed these results and agree with these findings:  [x]  Laboratory  LAB RESULTS: Personally reviewed BMP, CBC, magnesium, phosphorus, blood sugars      Lab 01/13/25  0157 01/13/25  0130 01/12/25  0601 01/11/25  0554 01/10/25  0639 01/10/25  0523 01/08/25  0548 01/07/25  0543   WBC 13.00*  --  9.48 6.48  --  4.99 5.73 5.49   HEMOGLOBIN 11.3*  --  11.3* 11.3*  --  10.2* 10.3* 10.4*   HEMATOCRIT 38.8  --  39.5 37.6  --  35.9* 35.0* 36.4*   PLATELETS 202  --  174 157  --  190 134* 130*   NEUTROS ABS  --   --  7.48*  --   --   --  4.06 4.23   IMMATURE GRANS (ABS)  --   --  0.05  --   --   --  0.02 0.02   LYMPHS ABS  --   --  0.95  --   --   --  0.85 0.54*   MONOS ABS  --   --  0.86  --   --   --  0.68 0.60   EOS ABS  --   --  0.07  --   --   --  0.08 0.05   MCV 70.3*  --  71.2* 70.0*  --  70.5* 69.4* 71.4*   PROCALCITONIN 0.14  --   --   --   --   --   --   --    LACTATE  --  2.0  --   --  1.2  --   --   --           Lab 01/13/25  0157 01/12/25  0601 01/11/25  0554 01/10/25  0523 01/08/25  0548 01/07/25  0543   SODIUM 139 138 136 134* 133* 128*   POTASSIUM 3.5 3.3* 3.4* 3.2* 2.9* 3.6   CHLORIDE 100 100 99 100 98 95*   CO2 24.5 27.7 26.0 25.8 26.2 20.9*   ANION GAP 14.5 10.3 11.0 8.2 8.8 12.1   BUN 11 8 7* 6* 9 11   CREATININE 0.60* 0.55* 0.51* 0.44* 0.49* 0.60*   EGFR 106.5 109.3 111.8 116.9 113.2 106.5   GLUCOSE 123* 139* 132* 132* 100* 116*   CALCIUM 8.7 8.4* 8.0* 8.0* 7.8* 8.1*   MAGNESIUM 1.9 2.0 2.0 2.1 2.1 2.2   PHOSPHORUS 3.1 2.9 2.4*  --  2.6 2.7         Lab 01/12/25  0601 01/11/25  0554   TOTAL PROTEIN 6.6 6.2   ALBUMIN 3.1* 3.0*   GLOBULIN 3.5 3.2   ALT (SGPT) 17 16   AST (SGOT) 35 29   BILIRUBIN 1.3* 1.2   ALK PHOS 140* 140*         Lab 01/13/25  0157   PROBNP 287.1                 Lab 01/13/25  0130 01/10/25  0639   PH, ARTERIAL 7.462* 7.472*   PCO2, ARTERIAL 32.9* 35.4   PO2 ART 70.7* 67.8*   O2 SATURATION ART 95.1 94.5*   FIO2 21 21   HCO3 ART 23.5 25.9   BASE EXCESS ART 0.3 2.4*     Brief Urine Lab Results  (Last result in the past 365 days)        Color   Clarity   Blood   Leuk Est   Nitrite   Protein   CREAT   Urine HCG        01/11/25 0938 Dark Yellow   Turbid   Trace   Large (3+)   Positive   30 mg/dL (1+)                 Microbiology Results (last 10 days)       Procedure Component Value - Date/Time    Respiratory Panel PCR w/COVID-19(SARS-CoV-2) KAREN/UMESH/LEONARD/PAD/COR/BETSY In-House, NP Swab in UTM/VTM, 2 HR TAT - Swab, Nasopharynx [[id number]]  (Normal) Collected: 01/13/25 0333    Lab Status: Final result Specimen: Swab from Nasopharynx Updated: 01/13/25 0444     ADENOVIRUS, PCR Not Detected     Coronavirus 229E Not Detected     Coronavirus HKU1 Not Detected     Coronavirus NL63 Not Detected     Coronavirus OC43 Not Detected     COVID19 Not Detected     Human Metapneumovirus Not Detected     Human Rhinovirus/Enterovirus Not Detected     Influenza A PCR Not Detected     Influenza B PCR Not Detected      Parainfluenza Virus 1 Not Detected     Parainfluenza Virus 2 Not Detected     Parainfluenza Virus 3 Not Detected     Parainfluenza Virus 4 Not Detected     RSV, PCR Not Detected     Bordetella pertussis pcr Not Detected     Bordetella parapertussis PCR Not Detected     Chlamydophila pneumoniae PCR Not Detected     Mycoplasma pneumo by PCR Not Detected    Narrative:      In the setting of a positive respiratory panel with a viral infection PLUS a negative procalcitonin without other underlying concern for bacterial infection, consider observing off antibiotics or discontinuation of antibiotics and continue supportive care. If the respiratory panel is positive for atypical bacterial infection (Bordetella pertussis, Chlamydophila pneumoniae, or Mycoplasma pneumoniae), consider antibiotic de-escalation to target atypical bacterial infection.    Urine Culture - Urine, Urine, Random Void [[id number]]  (Abnormal)  (Susceptibility) Collected: 01/11/25 0938    Lab Status: Final result Specimen: Urine, Random Void Updated: 01/13/25 0851     Urine Culture >100,000 CFU/mL Proteus mirabilis    Narrative:      Colonization of the urinary tract without infection is common. Treatment is discouraged unless the patient is symptomatic, pregnant, or undergoing an invasive urologic procedure.    Susceptibility        Proteus mirabilis      TAMI      Amoxicillin + Clavulanate Susceptible      Ampicillin Susceptible      Ampicillin + Sulbactam Susceptible      Cefazolin (Urine) Susceptible      Cefepime Susceptible      Ceftazidime Susceptible      Ceftriaxone Susceptible      Gentamicin Susceptible      Levofloxacin Susceptible      Nitrofurantoin Resistant      Piperacillin + Tazobactam Susceptible      Trimethoprim + Sulfamethoxazole Susceptible                           Clostridioides difficile Toxin, PCR - Stool, Per Rectum [[id number]]  (Abnormal) Collected: 01/09/25 2025    Lab Status: Final result Specimen: Stool from Per Rectum  Updated: 01/09/25 2220     Toxigenic C. difficile by PCR Positive     027 Toxin Presumptive Negative    Narrative:      DNA from a toxigenic strain of C.difficile has been detected. Antigen testing for the presence of free C.difficile toxin is currently in progress, to help determine the clinical significance of this PCR result.     Clostridioides difficile toxin Ag, Reflex - Stool, Per Rectum [[id number]]  (Normal) Collected: 01/09/25 2025    Lab Status: Final result Specimen: Stool from Per Rectum Updated: 01/09/25 2218     C.diff Toxin Ag Negative    Narrative:      DNA from a toxigenic strain of C.difficile was detected, although the free toxin itself was not detected. These findings are consistent with C.difficile colonization and may not reflect actual C.difficile infection. Clinical correlation needed.            []  Microbiology  []  Radiology  XR Chest 1 View    Result Date: 1/13/2025  Bilateral infiltrates are seen. The findings may represent pulmonary edema with vascular congestion. Infectious multifocal pneumonia cannot be excluded.    Portions of this note were completed with a voice recognition program.  Electronically Signed By-Ashwin Lopes MD On:1/13/2025 3:11 AM      CT Head Without Contrast    Result Date: 1/10/2025  Senescent changes without acute abnormality.    Electronically Signed By-Dr. Los Harvey MD On:1/10/2025 6:08 AM       [x]  EKG/Telemetry   []  Cardiology/Vascular   []  Pathology  []  Old records  [x]  Other:  Scheduled Meds:ampicillin-sulbactam, 3 g, Intravenous, Q6H  apixaban, 5 mg, Oral, Q12H  atorvastatin, 10 mg, Oral, Nightly  [Held by provider] buPROPion XL, 150 mg, Oral, Daily  digoxin, 125 mcg, Oral, Daily  ferrous sulfate, 325 mg, Oral, Daily With Breakfast  furosemide, 40 mg, Intravenous, BID Diuretics  insulin glargine, 13 Units, Subcutaneous, Daily  insulin regular, 2-7 Units, Subcutaneous, Q6H  Lidocaine, 3 patch, Transdermal, Q24H  magnesium oxide, 800 mg, Oral,  BID  metoprolol succinate XL, 12.5 mg, Oral, Q24H  mineral oil-hydrophilic petrolatum, 1 Application, Topical, Daily  sertraline, 50 mg, Oral, Nightly  sodium chloride, 10 mL, Intravenous, Q12H      Continuous Infusions:   PRN Meds:.  acetaminophen **OR** acetaminophen **OR** acetaminophen    calcium carbonate    [Held by provider] cyclobenzaprine    dextrose    dextrose    glucagon (human recombinant)    loperamide    melatonin    ondansetron ODT **OR** ondansetron    sodium chloride    sodium chloride    [Held by provider] traMADol      Assessment & Plan   Assessment / Plan   Delirium  Urinary tract infection, Proteus  Concern for aspiration pneumonia  Acute on chronic diastolic congestive heart failure with acute exacerbation  Suicidal ideation  Chronic bilateral foot wounds  Paroxysmal atrial fibrillation  Type 2 diabetes mellitus  Morbid obesity with BMI of 52  Debility with wheelchair dependence  Osteoarthritis of the hip  Chronic venous stasis  Deconditioning  Hypomagnesemia  Buried penis  Hypophosphatemia  Hypokalemia  Hemorrhoids  Anasarca  Urinary retention  Microcytic anemia    Continue to monitor in the hospital for workup and management of the above  Patient's urine has come back as Proteus, overnight concern for aspiration therefore adjusting antibiotics to Unasyn  Continue to hold sedating medications  Evaluated by speech therapy this morning, recommendations for core track, to be placed.  Nutrition consult placed for tube feeds  CT head obtained on 10th, however given persistent altered mental status and new difficulty swallowing will repeat  We will transition patient's diuretics to IV Lasix, strict I's and O's  Continue fluid restriction  Voiding trial successfully completed, keep King catheter out now and at discharge.  Monitor for urinary retention  Patient with diarrhea, discontinuing scheduled bowel regiment.  Patient has chronic colonization of C. difficile however no active  disease.  Continue wound care per nursing  Continue metoprolol, digoxin, Eliquis  Continue Lantus and sliding scale insulin, Blood sugars acceptable  Patient received iron replacement during current hospitalization  Continue physical therapy Occupational Therapy  Wound care continues to follow along  Trend renal function and electrolytes with a.m. BMP, magnesium   Trend Hgb and WBC with a.m. CBC  Hold work on appropriate disposition once patient's clinical status improves    Discussed case with: Bedside RN    VTE Prophylaxis:  Pharmacologic & mechanical VTE prophylaxis orders are present.        CODE STATUS:   Level Of Support Discussed With: Patient  Code Status (Patient has no pulse and is not breathing): CPR (Attempt to Resuscitate)  Medical Interventions (Patient has pulse or is breathing): Full Support

## 2025-01-13 NOTE — PLAN OF CARE
Goal Outcome Evaluation:       FUNCTIONAL ASSESSMENT INSTRUMENT: Patient currently scored a level 1 of 7 on Functional Communication Measures for swallowing indicating a 100% limitation in function.     ASSESSMENT/ PLAN OF CARE:  Pt presents with limitations, noted below, that impede his ability to swallow safely. The skills of a therapist will be required to safely and effectively implement the following treatment plan to restore maximal level of function.     PROBLEMS:  1.  Dysphagia              LTG 1: (30 days) patient will increase ability to tolerate least restrictive diet and improve functional communication measure for swallowing to a 4 of 7              STG 1a: (14 days) patient will tolerate therapeutic trials of thin, nectar, honey thick liquids without clinical sign or symptom of aspiration with 8 of 10 trials.              STG 1b: (14 days) patient will perform oral motor exercises x 3 sets of 10 per session              STG 1c: (14 days) patient will perform laryngeal strengthening exercises x 3 sets of 10 per session              STG 1d: (14 days) patient will tolerate therapeutic trials of purée consistencies no clinical signs of aspiration with 8 of 10 trials.              TREATMENT: Dysphagia therapy to ensure diet tolerance, advance to least restrictive diet and analyze aspiration risk.     FREQUENCY/DURATION: Daily 5 times a week     REHAB POTENTIAL:  Pt has good rehab potential.  The following limitations may influence improvement/ length of tx medical status.     RECOMMENDATIONS:   1.   DIET: Unable to recommend safe p.o. diet at this time.  Patient could benefit from alternative source of nutrition/hydration until patient can safely resume p.o. intake.     Recommend aggressive oral care and use of mouth moisturizer.          Pt/responsible party agrees with plan of care and has been informed of all alternatives, risks and benefits.

## 2025-01-13 NOTE — NURSING NOTE
"At bedside to place Cortrak.  Explained the Cortrak feeding tube and need for insertion.  Pt clearly stated, \"I do not want a Cotrak\".  Discussed with pt that he will not be allowed to eat or drink anything by mouth due to risk of aspiration.  Pt verbalized understanding.  Primary RN and PCA at bedside, pt repeated \"I do not want a feeding tube\"  Primary to notify provider.    "

## 2025-01-13 NOTE — PROGRESS NOTES
Respiratory Therapist Broncho-Pulmonary Hygiene Progress Note      Patient Name:  Jose Shaikh  YOB: 1960    Jose Shaikh meets the qualification for Level 1 of the Bronco-Pulmonary Hygiene Protocol. This was based on my daily patient assessment and includes review of chest x-ray results, cough ability and quality, oxygenation, secretions or risk for secretion development and patient mobility.     Broncho-Pulmonary Hygiene Assessment:    Level of Movement: Actively changing positions without assistance  Alert/ oriented/ cooperative    Breath Sounds: Diminished and/or coarse rhonchi    Cough: Strong, effective    Chest X-Ray: Possible signs of consolidation and/or atelectasis or clear.     Sputum Productions: None or small amount of thin or watery secretions with effective cough    History and Physical: None    SpO2 to Oxygen Need: greater than 92% on room air or  less than 3L nasal canula    Current SpO2 is: 95 on Room air    Based on this information, I have completed the following interventions: Teach/Instruct patient on cough and deep breathe      Electronically signed by Carline Ivory RRT, 01/13/25, 2:09 PM EST.

## 2025-01-14 LAB
ANION GAP SERPL CALCULATED.3IONS-SCNC: 15.5 MMOL/L (ref 5–15)
BASOPHILS # BLD AUTO: 0.06 10*3/MM3 (ref 0–0.2)
BASOPHILS NFR BLD AUTO: 0.6 % (ref 0–1.5)
BUN SERPL-MCNC: 11 MG/DL (ref 8–23)
BUN/CREAT SERPL: 20.4 (ref 7–25)
BURR CELLS BLD QL SMEAR: NORMAL
CALCIUM SPEC-SCNC: 8.5 MG/DL (ref 8.6–10.5)
CHLORIDE SERPL-SCNC: 100 MMOL/L (ref 98–107)
CO2 SERPL-SCNC: 27.5 MMOL/L (ref 22–29)
CREAT SERPL-MCNC: 0.54 MG/DL (ref 0.76–1.27)
DACRYOCYTES BLD QL SMEAR: NORMAL
DEPRECATED RDW RBC AUTO: 59.7 FL (ref 37–54)
EGFRCR SERPLBLD CKD-EPI 2021: 109.9 ML/MIN/1.73
ELLIPTOCYTES BLD QL SMEAR: NORMAL
EOSINOPHIL # BLD AUTO: 0.12 10*3/MM3 (ref 0–0.4)
EOSINOPHIL NFR BLD AUTO: 1.3 % (ref 0.3–6.2)
ERYTHROCYTE [DISTWIDTH] IN BLOOD BY AUTOMATED COUNT: 24 % (ref 12.3–15.4)
GLUCOSE BLDC GLUCOMTR-MCNC: 102 MG/DL (ref 70–99)
GLUCOSE BLDC GLUCOMTR-MCNC: 122 MG/DL (ref 70–99)
GLUCOSE BLDC GLUCOMTR-MCNC: 125 MG/DL (ref 70–99)
GLUCOSE BLDC GLUCOMTR-MCNC: 164 MG/DL (ref 70–99)
GLUCOSE BLDC GLUCOMTR-MCNC: 181 MG/DL (ref 70–99)
GLUCOSE SERPL-MCNC: 154 MG/DL (ref 65–99)
HCT VFR BLD AUTO: 43.1 % (ref 37.5–51)
HGB BLD-MCNC: 12.3 G/DL (ref 13–17.7)
IMM GRANULOCYTES # BLD AUTO: 0.04 10*3/MM3 (ref 0–0.05)
IMM GRANULOCYTES NFR BLD AUTO: 0.4 % (ref 0–0.5)
LYMPHOCYTES # BLD AUTO: 1.05 10*3/MM3 (ref 0.7–3.1)
LYMPHOCYTES NFR BLD AUTO: 11 % (ref 19.6–45.3)
MAGNESIUM SERPL-MCNC: 1.7 MG/DL (ref 1.6–2.4)
MCH RBC QN AUTO: 20.4 PG (ref 26.6–33)
MCHC RBC AUTO-ENTMCNC: 28.5 G/DL (ref 31.5–35.7)
MCV RBC AUTO: 71.5 FL (ref 79–97)
MICROCYTES BLD QL: NORMAL
MONOCYTES # BLD AUTO: 0.83 10*3/MM3 (ref 0.1–0.9)
MONOCYTES NFR BLD AUTO: 8.7 % (ref 5–12)
NEUTROPHILS NFR BLD AUTO: 7.44 10*3/MM3 (ref 1.7–7)
NEUTROPHILS NFR BLD AUTO: 78 % (ref 42.7–76)
NRBC BLD AUTO-RTO: 0 /100 WBC (ref 0–0.2)
PHOSPHATE SERPL-MCNC: 3.1 MG/DL (ref 2.5–4.5)
PLATELET # BLD AUTO: 191 10*3/MM3 (ref 140–450)
PMV BLD AUTO: 9.7 FL (ref 6–12)
POTASSIUM SERPL-SCNC: 3.2 MMOL/L (ref 3.5–5.2)
PROCALCITONIN SERPL-MCNC: 0.1 NG/ML (ref 0–0.25)
QT INTERVAL: 268 MS
QTC INTERVAL: 414 MS
RBC # BLD AUTO: 6.03 10*6/MM3 (ref 4.14–5.8)
SMALL PLATELETS BLD QL SMEAR: ADEQUATE
SODIUM SERPL-SCNC: 143 MMOL/L (ref 136–145)
TARGETS BLD QL SMEAR: NORMAL
WBC MORPH BLD: NORMAL
WBC NRBC COR # BLD AUTO: 9.54 10*3/MM3 (ref 3.4–10.8)

## 2025-01-14 PROCEDURE — 25010000002 ENOXAPARIN PER 10 MG: Performed by: INTERNAL MEDICINE

## 2025-01-14 PROCEDURE — 85025 COMPLETE CBC W/AUTO DIFF WBC: CPT | Performed by: INTERNAL MEDICINE

## 2025-01-14 PROCEDURE — 82948 REAGENT STRIP/BLOOD GLUCOSE: CPT

## 2025-01-14 PROCEDURE — 99291 CRITICAL CARE FIRST HOUR: CPT | Performed by: INTERNAL MEDICINE

## 2025-01-14 PROCEDURE — 82948 REAGENT STRIP/BLOOD GLUCOSE: CPT | Performed by: INTERNAL MEDICINE

## 2025-01-14 PROCEDURE — 84145 PROCALCITONIN (PCT): CPT | Performed by: INTERNAL MEDICINE

## 2025-01-14 PROCEDURE — 80048 BASIC METABOLIC PNL TOTAL CA: CPT | Performed by: INTERNAL MEDICINE

## 2025-01-14 PROCEDURE — 92526 ORAL FUNCTION THERAPY: CPT

## 2025-01-14 PROCEDURE — 63710000001 INSULIN GLARGINE PER 5 UNITS: Performed by: INTERNAL MEDICINE

## 2025-01-14 PROCEDURE — 25010000002 AMPICILLIN-SULBACTAM PER 1.5 G: Performed by: INTERNAL MEDICINE

## 2025-01-14 PROCEDURE — 63710000001 INSULIN REGULAR HUMAN PER 5 UNITS: Performed by: INTERNAL MEDICINE

## 2025-01-14 PROCEDURE — 25010000002 ACETAMINOPHEN 10 MG/ML SOLUTION: Performed by: PHYSICIAN ASSISTANT

## 2025-01-14 PROCEDURE — 85007 BL SMEAR W/DIFF WBC COUNT: CPT | Performed by: INTERNAL MEDICINE

## 2025-01-14 PROCEDURE — 83735 ASSAY OF MAGNESIUM: CPT | Performed by: INTERNAL MEDICINE

## 2025-01-14 PROCEDURE — 84100 ASSAY OF PHOSPHORUS: CPT | Performed by: INTERNAL MEDICINE

## 2025-01-14 PROCEDURE — 25010000002 DIGOXIN PER 500 MCG: Performed by: INTERNAL MEDICINE

## 2025-01-14 PROCEDURE — 25010000002 FUROSEMIDE PER 20 MG: Performed by: INTERNAL MEDICINE

## 2025-01-14 RX ORDER — BUPROPION HYDROCHLORIDE 75 MG/1
75 TABLET ORAL EVERY 12 HOURS SCHEDULED
Status: DISCONTINUED | OUTPATIENT
Start: 2025-01-14 | End: 2025-01-16 | Stop reason: HOSPADM

## 2025-01-14 RX ORDER — DIGOXIN 125 MCG
125 TABLET ORAL
Status: DISCONTINUED | OUTPATIENT
Start: 2025-01-15 | End: 2025-01-16 | Stop reason: HOSPADM

## 2025-01-14 RX ORDER — ACETAMINOPHEN 10 MG/ML
1000 INJECTION, SOLUTION INTRAVENOUS ONCE
Status: COMPLETED | OUTPATIENT
Start: 2025-01-14 | End: 2025-01-14

## 2025-01-14 RX ORDER — METOPROLOL TARTRATE 25 MG/1
12.5 TABLET, FILM COATED ORAL EVERY 8 HOURS
Status: DISCONTINUED | OUTPATIENT
Start: 2025-01-14 | End: 2025-01-16 | Stop reason: HOSPADM

## 2025-01-14 RX ORDER — POTASSIUM CHLORIDE 1.5 G/1.58G
40 POWDER, FOR SOLUTION ORAL ONCE
Status: COMPLETED | OUTPATIENT
Start: 2025-01-14 | End: 2025-01-14

## 2025-01-14 RX ORDER — TRAMADOL HYDROCHLORIDE 50 MG/1
50 TABLET ORAL EVERY 8 HOURS PRN
Status: DISCONTINUED | OUTPATIENT
Start: 2025-01-14 | End: 2025-01-15

## 2025-01-14 RX ORDER — DIGOXIN 0.25 MG/ML
125 INJECTION INTRAMUSCULAR; INTRAVENOUS ONCE
Status: COMPLETED | OUTPATIENT
Start: 2025-01-14 | End: 2025-01-14

## 2025-01-14 RX ADMIN — POTASSIUM CHLORIDE 40 MEQ: 1.5 POWDER, FOR SOLUTION ORAL at 18:30

## 2025-01-14 RX ADMIN — AMPICILLIN AND SULBACTAM 3 G: 2; 1 INJECTION, POWDER, FOR SOLUTION INTRAVENOUS at 17:22

## 2025-01-14 RX ADMIN — METOPROLOL TARTRATE 12.5 MG: 25 TABLET, FILM COATED ORAL at 10:31

## 2025-01-14 RX ADMIN — FUROSEMIDE 40 MG: 10 INJECTION, SOLUTION INTRAMUSCULAR; INTRAVENOUS at 17:23

## 2025-01-14 RX ADMIN — AMPICILLIN AND SULBACTAM 3 G: 2; 1 INJECTION, POWDER, FOR SOLUTION INTRAVENOUS at 12:20

## 2025-01-14 RX ADMIN — FERROUS SULFATE TAB 325 MG (65 MG ELEMENTAL FE) 325 MG: 325 (65 FE) TAB at 10:31

## 2025-01-14 RX ADMIN — DIGOXIN 125 MCG: 0.25 INJECTION INTRAMUSCULAR; INTRAVENOUS at 10:15

## 2025-01-14 RX ADMIN — TRAMADOL HYDROCHLORIDE 50 MG: 50 TABLET, COATED ORAL at 20:08

## 2025-01-14 RX ADMIN — WHITE PETROLATUM 1 APPLICATION: 1.75 OINTMENT TOPICAL at 10:21

## 2025-01-14 RX ADMIN — ACETAMINOPHEN 1000 MG: 10 INJECTION, SOLUTION INTRAVENOUS at 23:32

## 2025-01-14 RX ADMIN — AMPICILLIN AND SULBACTAM 3 G: 2; 1 INJECTION, POWDER, FOR SOLUTION INTRAVENOUS at 05:40

## 2025-01-14 RX ADMIN — INSULIN GLARGINE 8 UNITS: 100 INJECTION, SOLUTION SUBCUTANEOUS at 10:26

## 2025-01-14 RX ADMIN — BUPROPION HYDROCHLORIDE 75 MG: 75 TABLET, FILM COATED ORAL at 10:31

## 2025-01-14 RX ADMIN — AMPICILLIN AND SULBACTAM 3 G: 2; 1 INJECTION, POWDER, FOR SOLUTION INTRAVENOUS at 00:22

## 2025-01-14 RX ADMIN — TRAMADOL HYDROCHLORIDE 50 MG: 50 TABLET, COATED ORAL at 12:20

## 2025-01-14 RX ADMIN — Medication 10 ML: at 20:08

## 2025-01-14 RX ADMIN — Medication 10 ML: at 08:25

## 2025-01-14 RX ADMIN — FUROSEMIDE 40 MG: 10 INJECTION, SOLUTION INTRAMUSCULAR; INTRAVENOUS at 08:25

## 2025-01-14 RX ADMIN — ATORVASTATIN CALCIUM 10 MG: 10 TABLET, FILM COATED ORAL at 20:07

## 2025-01-14 RX ADMIN — ENOXAPARIN SODIUM 135 MG: 150 INJECTION SUBCUTANEOUS at 04:37

## 2025-01-14 RX ADMIN — APIXABAN 5 MG: 5 TABLET, FILM COATED ORAL at 20:08

## 2025-01-14 RX ADMIN — METOPROLOL TARTRATE 12.5 MG: 25 TABLET, FILM COATED ORAL at 18:30

## 2025-01-14 RX ADMIN — SERTRALINE 50 MG: 50 TABLET, FILM COATED ORAL at 20:08

## 2025-01-14 RX ADMIN — INSULIN HUMAN 2 UNITS: 100 INJECTION, SOLUTION PARENTERAL at 12:35

## 2025-01-14 RX ADMIN — BUPROPION HYDROCHLORIDE 75 MG: 75 TABLET, FILM COATED ORAL at 20:08

## 2025-01-14 NOTE — PLAN OF CARE
Goal Outcome Evaluation:           Progress: no change  Outcome Evaluation: Patient is alert to name and place and make needs known. On room air. converted to afib with heart rate 140-150 bpm during beginning of shift. Cardizem drip initiated.VSS, IV antibiotics per mar. patient remains NPO.  Multible small loose bowel movements during shift. Continue with POC

## 2025-01-14 NOTE — THERAPY TREATMENT NOTE
Acute Care - Speech Language Pathology   Swallow Treatment Note KEO Dominguez     Patient Name: Jose Shaikh  : 1960  MRN: [id number]  Today's Date: 2025               Admit Date: 2024    Visit Dx:     ICD-10-CM ICD-9-CM   1. Hypervolemia, unspecified hypervolemia type  E87.70 276.69   2. Congestive heart failure, unspecified HF chronicity, unspecified heart failure type  I50.9 428.0   3. Suicidal ideation  R45.851 V62.84     Patient Active Problem List   Diagnosis    Diabetic ulcer of left heel associated with type 2 DM    Acute osteomyelitis of left calcaneus     Diabetic ulcer of left heel associated with type 2 DM    Diabetic ulcer of right midfoot associated with type 2 DM    Paroxysmal atrial fibrillation    Essential hypertension    Hyperlipidemia LDL goal <100    Cellulitis and abscess of foot    High alkaline phosphatase level    Osteomyelitis    Onychomycosis    Onychocryptosis    Foot pain, bilateral    Osteomyelitis of foot, right, acute    Cellulitis of right foot    Type 2 diabetes mellitus, with long-term current use of insulin    Class 3 severe obesity due to excess calories with serious comorbidity and body mass index (BMI) of 45.0 to 49.9 in adult    Anxiety disorder, unspecified    Claustrophobia    Dependence on wheelchair    Depression, unspecified    Long term (current) use of anticoagulants    Long term (current) use of oral hypoglycemic drugs    Wound of foot    Ulcer of right foot    Orthostatic hypotension    Other chronic osteomyelitis, right ankle and foot    Personal history of nicotine dependence    Thrombocytopenia, unspecified    Unspecified open wound, right foot, initial encounter    Diabetic foot infection    Subacute osteomyelitis of right foot    Right foot pain    Sepsis    Onychomycosis    Foot pain, left    COVID-19 virus detected    Absence of toe of right foot    Corns and callosity    Disability of walking    Fracture    Limb swelling    Polyneuropathy     Pressure ulcer, stage 1    Shortness of breath    Generalized weakness    Debility    Onychomycosis    Noncompliance of patient with dietary regimen    Morbid obesity    COVID-19 virus infection    COVID-19    CHF exacerbation     Past Medical History:   Diagnosis Date    Absence of toe of right foot     Acute osteomyelitis of left calcaneus  08/18/2021    Anxiety and depression     Arthritis     Cancer     Chronic pain     STATES HIS PAIN IS 10/10 AAT    Claustrophobia     Corns and callus     Diabetic ulcer of left heel associated with type 2 DM 08/18/2021    Diabetic ulcer of left heel associated with type 2 DM 07/06/2021    Diabetic ulcer of right midfoot associated with type 2 DM 08/18/2021    Difficulty walking     Essential hypertension 08/31/2021    Hammertoe     Hyperlipidemia LDL goal <100 08/31/2021    Ingrown toenail     Obesity     Paroxysmal atrial fibrillation 08/31/2021    Polyneuropathy     Pressure ulcer, stage 1     Tinea unguium     Type 2 diabetes mellitus with polyneuropathy      Past Surgical History:   Procedure Laterality Date    CYST REMOVAL      center of back; benign    EYE SURGERY      INCISION AND DRAINAGE ABSCESS      back    INCISION AND DRAINAGE LEG Right 12/10/2021    Procedure: INCISION AND DRAINAGE LOWER EXTREMITY;  Surgeon: Ash Leyva DPM;  Location: [facility];  Service: Podiatry;  Laterality: Right;    OTHER SURGICAL HISTORY      Surgical clips left foot    TOE SURGERY Right     Removal of 5th toe    TRANS METATARSAL AMPUTATION Right 12/02/2021    Procedure: AMPUTATION TRANS METATARSAL;  Surgeon: Ash Leyva DPM;  Location: [facility] OR;  Service: Podiatry;  Laterality: Right;    VASCULAR SURGERY      WRIST SURGERY Left     repair of injury     SPEECH PATHOLOGY DYSPHAGIA TREATMENT    Subjective/Behavioral Observations: Patient seen for dysphagia tx.      Current Diet:NPO    Current Strategies: Patient seen at bedside.  Much more awake and alert.   Some return of voicing    Treatment received: Patient seen at bedside.  Voicing improving.  Therapeutic trials of nectar thick liquids from spoon and cup completed without clinical sign or symptom of aspiration.  Therapeutic trials of purée consistencies completed, no clinical signs or symptoms of aspiration.  Initially reduced laryngeal elevation per palpation however this improved with progressive trials.  Therapeutic trials of thin liquids completed from cup and straw, no clinical signs of aspiration noted with single controlled drinks.  Patient somewhat impulsive and wet voicing was noted with large sequential straw drinks.    Results of treatment: As today    Progress toward goals: Progress noted    Barriers to Achieving goals: Medical status    Plan of care:/changes in plan: Purée diet-single sips of thin liquids  Upright for all intake  Slow rate, small bites/drinks  Oral meds in applesauce, crushed if needed    EDUCATION  The patient has been educated in the following areas:   Dysphagia (Swallowing Impairment).            Bree Way, SLP  1/14/2025

## 2025-01-14 NOTE — PROGRESS NOTES
[facility]   Hospitalist Progress Note    Date of admission: 12/18/2024  Patient Name: Jose Shaikh  1960  Date: 1/14/2025      Subjective     Chief Complaint   Patient presents with   • Suicidal       Interval Followup: Patient declined core track, mentation doing better, he was cleared for a partial diet today.  Still notes left hip pain.  Had A-fib with rvr last night, required diltiazem drip.  Denied any chest pain or pressure      Objective     Vitals:   Temp:  [97.2 °F (36.2 °C)-98.2 °F (36.8 °C)] 97.2 °F (36.2 °C)  Heart Rate:  [] 95  Resp:  [20-24] 22  BP: (109-145)/() 123/54    Physical Exam  Awake conversant in bed, morbidly obese  CTAB no wheezing  Irregularly irregular rate near 80s currently on dill drip  Abd soft  Left hip pain    Result Review:  Vital signs, labs and recent relevant imaging reviewed.      CBC          1/11/2025    05:54 1/12/2025    06:01 1/13/2025    01:57   CBC   WBC 6.48  9.48  13.00    RBC 5.37  5.55  5.52    Hemoglobin 11.3  11.3  11.3    Hematocrit 37.6  39.5  38.8    MCV 70.0  71.2  70.3    MCH 21.0  20.4  20.5    MCHC 30.1  28.6  29.1    RDW 23.1  23.5  23.6    Platelets 157  174  202      CMP          1/11/2025    05:54 1/12/2025    06:01 1/13/2025    01:57   CMP   Glucose 132  139  123    BUN 7  8  11    Creatinine 0.51  0.55  0.60    EGFR 111.8  109.3  106.5    Sodium 136  138  139    Potassium 3.4  3.3  3.5    Chloride 99  100  100    Calcium 8.0  8.4  8.7    Total Protein 6.2  6.6     Albumin 3.0  3.1     Globulin 3.2  3.5     Total Bilirubin 1.2  1.3     Alkaline Phosphatase 140  140     AST (SGOT) 29  35     ALT (SGPT) 16  17     Albumin/Globulin Ratio 0.9  0.9     BUN/Creatinine Ratio 13.7  14.5  18.3    Anion Gap 11.0  10.3  14.5        •  acetaminophen **OR** acetaminophen **OR** acetaminophen  •  calcium carbonate  •  [Held by provider] cyclobenzaprine  •  dextrose  •  dextrose  •  glucagon (human recombinant)  •  loperamide  •  melatonin  •   ondansetron ODT **OR** ondansetron  •  Pharmacy to Dose enoxaparin (LOVENOX)  •  sodium chloride  •  sodium chloride  •  [Held by provider] traMADol    ampicillin-sulbactam, 3 g, Intravenous, Q6H  apixaban, 5 mg, Oral, Q12H  atorvastatin, 10 mg, Oral, Nightly  buPROPion, 75 mg, Nasogastric, Q12H  digoxin, 125 mcg, Oral, Daily  ferrous sulfate, 325 mg, Oral, Daily With Breakfast  furosemide, 40 mg, Intravenous, BID Diuretics  insulin glargine, 13 Units, Subcutaneous, Daily  insulin regular, 2-7 Units, Subcutaneous, Q6H  Lidocaine, 3 patch, Transdermal, Q24H  [Held by provider] magnesium oxide, 800 mg, Oral, BID  metoprolol succinate XL, 12.5 mg, Oral, Q24H  mineral oil-hydrophilic petrolatum, 1 Application, Topical, Daily  sertraline, 50 mg, Oral, Nightly  sodium chloride, 10 mL, Intravenous, Q12H        XR Chest 1 View    Result Date: 1/13/2025  Bilateral infiltrates are seen. The findings may represent pulmonary edema with vascular congestion. Infectious multifocal pneumonia cannot be excluded.    Portions of this note were completed with a voice recognition program.  Electronically Signed By-Ashwin Lopes MD On:1/13/2025 3:11 AM      CT Head Without Contrast    Result Date: 1/10/2025  Senescent changes without acute abnormality.    Electronically Signed By-Dr. Los Harvey MD On:1/10/2025 6:08 AM       Assessment / Plan     Summary: 64 y.o. w/ dCHF who presented with significant volume overload and dCHF exacerbation/soa as well as initial suicidal ideation. Patient also states that he does not have anybody at home to take care of him. He has chronic leg wounds. He has a history of diabetes atrial fibrillation ulcers. The patient states that he is going to take all his muscle relaxants to kill himself because he cannot go on living like this. He was admitted for further care, psychiatry was consulted. Patient was no longer suicidal after he was admitted to the hospital and bedside sitter was discontinued. He  was started on Wellbutrin per psychiatry recommendations. He was diuresed aggressively, King catheter was placed by urology. Wound care following. Can have a voiding trial prior to discharge. Having good urine output with over 25 L of fluid removed since admission.  Unable to go to rehab, unable to have home health agency except at discharge.  When euvolemic, plan for disposition.  Has been difficult given limited options for patient on safe discharge planning.  Patient remains apprehensive to discharge to a long-term care facility at this time, most appropriate option for him.  However, patient fearful of losing his check when discharging to long-term care facility.  Patient with worsening mental status, repeat UA obtained on the     Assessment/Plan (clinically significant if listed here)  Acute on chronic diastolic congestive heart failure with acute exacerbation  Proteus mirabilis UTI  Concern for aspiration pneumonia with gram-negative's covering with antibiotics  Suicidal ideation  Delirium/confusion suspect component of tramadol/pain medication related  Chronic bilateral foot wounds  Urinary retention in setting of obesity/anasarca/obstruction, resolved with diuresis  Paroxysmal atrial fibrillation, on apixaban   Type 2 diabetes mellitus  Morbid obesity with BMI of 52  Debility with wheelchair dependence  Osteoarthritis of the hip  Chronic venous stasis  Deconditioning  Buried penis  Hypomagnesemia  Hypophosphatemia  Hypokalemia  Hemorrhoids  Microcytic anemia    1/10 CT head no acute findings  Repeat CT head still pending    1/13 blood cultures, respiratory viral panel negative, 1/11 urine culture with Proteus   C. difficile PCR positive with negative antigen suspect colonized    Went to A-fib RVR last night started on diltiazem drip - cont for now, goal HR <110 when consistently below this will titrate off.  Likely need additional 24 hrs   patient did not get his a.m. metoprolol yesterday, continue current  dose, digoxin give iv dose then cont po, monitor on telemetry, if unable to wean off of diltiazem will consider further increase/titration.  Continue telemetry monitoring.  Apixaban for anticoagulation  IV lasix, monitor i/o and renal fxn, bnp wnl but with obesity suspect not accurate   Replace potassium will need additional doses with diuresis  1/13 Unasyn for UTI and possible aspiration pneumonia, had been on ceftriaxone for 2 days prior. Procal 0.1.  White count improving   F/u cultures, urine is sensitive   Passed voiding trial   CT head had negative scan on 1/10, delirium precautions, holding tramadol, had been receiving fairly regularly, last dose was 1/11 (was 100 q6h prn) - mentation improving today, still hasn't had repeat scan ordered but given improvement will discontinue for now   Patient asking for increased pain meds today as has not had them recently, using lower dose of tramadol will not increase further at this time may not be safe earlier dose and will need to titrate closely does have chronic pain component, trying lidocaine patch and nonnarcotic options as well  Cont slp eval/monitoring, aspiration precautions  cleared for puréed diet with sips and thin liquids today, monitor closely  cont insulin, ssi, titrating dose/decrease morning dose given variable diet currently  cont sertraline, wellbutrin siwtch to short acting/crushable to give via ng   Monitor diarrhea, also hold mag oxide in case this is contributing as well  Had chronic c diff colonization without active infection/antigen negative   Cont wound care   Cont iron therapy  PT/OT  Check a.m. CBC, BMP, magnesium, phosphorus  Continue hospitalization at current level of care    Dispo: ltc placement once medically stable. Hopefully in next few days if continues to improve.  Placement will cont to be a challenge.    D/w DANYEL    VTE Prophylaxis:  Pharmacologic & mechanical VTE prophylaxis orders are present.      Level Of Support Discussed With:  Patient  Code Status (Patient has no pulse and is not breathing): CPR (Attempt to Resuscitate)  Medical Interventions (Patient has pulse or is breathing): Full Support    Patient is critically ill due to afib rvr, requiring diltiazem drip, chf exacerbation, ams, uti, asp pna, and additional issues as noted above.  I have spent 32 minutes of critical care time reviewing documentation, pertinent labs, imaging studies, examining the patient, modifying care plan, and discussing patient's condition and care plan with the patient, nursing and sw, speech therapy.

## 2025-01-14 NOTE — PLAN OF CARE
Goal Outcome Evaluation:  Plan of Care Reviewed With: patient           Outcome Evaluation: Patient is AO x4, able to make needs known. Patient is currently on room air, no signs of respiratory distress noted. Patient complained of pain, treated per MAR. Blood glucose monitored, treated per MAR. Wound care/skin care completed per orders. Patient remains on cardizem gtt. Diet advanced today, tolerating well. Continue with current plan of care.

## 2025-01-15 LAB
ANION GAP SERPL CALCULATED.3IONS-SCNC: 7.4 MMOL/L (ref 5–15)
BASOPHILS # BLD AUTO: 0.07 10*3/MM3 (ref 0–0.2)
BASOPHILS NFR BLD AUTO: 1.1 % (ref 0–1.5)
BUN SERPL-MCNC: 11 MG/DL (ref 8–23)
BUN/CREAT SERPL: 22 (ref 7–25)
CALCIUM SPEC-SCNC: 8 MG/DL (ref 8.6–10.5)
CHLORIDE SERPL-SCNC: 96 MMOL/L (ref 98–107)
CO2 SERPL-SCNC: 28.6 MMOL/L (ref 22–29)
CREAT SERPL-MCNC: 0.5 MG/DL (ref 0.76–1.27)
DEPRECATED RDW RBC AUTO: 58.2 FL (ref 37–54)
EGFRCR SERPLBLD CKD-EPI 2021: 112.5 ML/MIN/1.73
EOSINOPHIL # BLD AUTO: 0.2 10*3/MM3 (ref 0–0.4)
EOSINOPHIL NFR BLD AUTO: 3.3 % (ref 0.3–6.2)
ERYTHROCYTE [DISTWIDTH] IN BLOOD BY AUTOMATED COUNT: 24 % (ref 12.3–15.4)
GLUCOSE BLDC GLUCOMTR-MCNC: 122 MG/DL (ref 70–99)
GLUCOSE BLDC GLUCOMTR-MCNC: 136 MG/DL (ref 70–99)
GLUCOSE BLDC GLUCOMTR-MCNC: 182 MG/DL (ref 70–99)
GLUCOSE SERPL-MCNC: 150 MG/DL (ref 65–99)
HCT VFR BLD AUTO: 36.3 % (ref 37.5–51)
HGB BLD-MCNC: 10.6 G/DL (ref 13–17.7)
IMM GRANULOCYTES # BLD AUTO: 0.02 10*3/MM3 (ref 0–0.05)
IMM GRANULOCYTES NFR BLD AUTO: 0.3 % (ref 0–0.5)
LYMPHOCYTES # BLD AUTO: 1.38 10*3/MM3 (ref 0.7–3.1)
LYMPHOCYTES NFR BLD AUTO: 22.4 % (ref 19.6–45.3)
MAGNESIUM SERPL-MCNC: 1.7 MG/DL (ref 1.6–2.4)
MCH RBC QN AUTO: 20.5 PG (ref 26.6–33)
MCHC RBC AUTO-ENTMCNC: 29.2 G/DL (ref 31.5–35.7)
MCV RBC AUTO: 70.1 FL (ref 79–97)
MONOCYTES # BLD AUTO: 0.72 10*3/MM3 (ref 0.1–0.9)
MONOCYTES NFR BLD AUTO: 11.7 % (ref 5–12)
NEUTROPHILS NFR BLD AUTO: 3.76 10*3/MM3 (ref 1.7–7)
NEUTROPHILS NFR BLD AUTO: 61.2 % (ref 42.7–76)
NRBC BLD AUTO-RTO: 0 /100 WBC (ref 0–0.2)
PHOSPHATE SERPL-MCNC: 2.6 MG/DL (ref 2.5–4.5)
PLATELET # BLD AUTO: 158 10*3/MM3 (ref 140–450)
PMV BLD AUTO: 9.7 FL (ref 6–12)
POTASSIUM SERPL-SCNC: 3.2 MMOL/L (ref 3.5–5.2)
RBC # BLD AUTO: 5.18 10*6/MM3 (ref 4.14–5.8)
SODIUM SERPL-SCNC: 132 MMOL/L (ref 136–145)
WBC NRBC COR # BLD AUTO: 6.15 10*3/MM3 (ref 3.4–10.8)

## 2025-01-15 PROCEDURE — 99233 SBSQ HOSP IP/OBS HIGH 50: CPT | Performed by: INTERNAL MEDICINE

## 2025-01-15 PROCEDURE — 82948 REAGENT STRIP/BLOOD GLUCOSE: CPT | Performed by: INTERNAL MEDICINE

## 2025-01-15 PROCEDURE — 80048 BASIC METABOLIC PNL TOTAL CA: CPT | Performed by: INTERNAL MEDICINE

## 2025-01-15 PROCEDURE — 84100 ASSAY OF PHOSPHORUS: CPT | Performed by: INTERNAL MEDICINE

## 2025-01-15 PROCEDURE — 63710000001 INSULIN REGULAR HUMAN PER 5 UNITS: Performed by: INTERNAL MEDICINE

## 2025-01-15 PROCEDURE — 92526 ORAL FUNCTION THERAPY: CPT

## 2025-01-15 PROCEDURE — 85025 COMPLETE CBC W/AUTO DIFF WBC: CPT | Performed by: INTERNAL MEDICINE

## 2025-01-15 PROCEDURE — 25010000002 FUROSEMIDE PER 20 MG: Performed by: INTERNAL MEDICINE

## 2025-01-15 PROCEDURE — 63710000001 INSULIN GLARGINE PER 5 UNITS: Performed by: FAMILY MEDICINE

## 2025-01-15 PROCEDURE — 25010000002 AMPICILLIN-SULBACTAM PER 1.5 G: Performed by: INTERNAL MEDICINE

## 2025-01-15 PROCEDURE — 83735 ASSAY OF MAGNESIUM: CPT | Performed by: INTERNAL MEDICINE

## 2025-01-15 RX ORDER — OXYCODONE AND ACETAMINOPHEN 5; 325 MG/1; MG/1
1 TABLET ORAL EVERY 8 HOURS PRN
Status: DISCONTINUED | OUTPATIENT
Start: 2025-01-15 | End: 2025-01-16 | Stop reason: HOSPADM

## 2025-01-15 RX ORDER — CYCLOBENZAPRINE HCL 5 MG
5 TABLET ORAL ONCE AS NEEDED
Status: COMPLETED | OUTPATIENT
Start: 2025-01-15 | End: 2025-01-15

## 2025-01-15 RX ORDER — POTASSIUM CHLORIDE 750 MG/1
30 CAPSULE, EXTENDED RELEASE ORAL
Status: COMPLETED | OUTPATIENT
Start: 2025-01-15 | End: 2025-01-15

## 2025-01-15 RX ADMIN — INSULIN HUMAN 2 UNITS: 100 INJECTION, SOLUTION PARENTERAL at 13:10

## 2025-01-15 RX ADMIN — CYCLOBENZAPRINE HYDROCHLORIDE 5 MG: 5 TABLET, FILM COATED ORAL at 01:31

## 2025-01-15 RX ADMIN — FERROUS SULFATE TAB 325 MG (65 MG ELEMENTAL FE) 325 MG: 325 (65 FE) TAB at 08:43

## 2025-01-15 RX ADMIN — AMOXICILLIN AND CLAVULANATE POTASSIUM 1 TABLET: 875; 125 TABLET, FILM COATED ORAL at 20:07

## 2025-01-15 RX ADMIN — ACETAMINOPHEN 650 MG: 325 TABLET ORAL at 15:43

## 2025-01-15 RX ADMIN — FUROSEMIDE 40 MG: 10 INJECTION, SOLUTION INTRAMUSCULAR; INTRAVENOUS at 17:24

## 2025-01-15 RX ADMIN — POTASSIUM CHLORIDE 30 MEQ: 750 CAPSULE, EXTENDED RELEASE ORAL at 10:55

## 2025-01-15 RX ADMIN — METOPROLOL TARTRATE 12.5 MG: 25 TABLET, FILM COATED ORAL at 17:54

## 2025-01-15 RX ADMIN — Medication 5 MG: at 20:07

## 2025-01-15 RX ADMIN — OXYCODONE HYDROCHLORIDE AND ACETAMINOPHEN 1 TABLET: 5; 325 TABLET ORAL at 20:07

## 2025-01-15 RX ADMIN — BUPROPION HYDROCHLORIDE 75 MG: 75 TABLET, FILM COATED ORAL at 08:43

## 2025-01-15 RX ADMIN — BUPROPION HYDROCHLORIDE 75 MG: 75 TABLET, FILM COATED ORAL at 20:07

## 2025-01-15 RX ADMIN — Medication 400 MG: at 10:55

## 2025-01-15 RX ADMIN — DIGOXIN 125 MCG: 125 TABLET ORAL at 13:09

## 2025-01-15 RX ADMIN — SERTRALINE 50 MG: 50 TABLET, FILM COATED ORAL at 20:07

## 2025-01-15 RX ADMIN — TRAMADOL HYDROCHLORIDE 50 MG: 50 TABLET, COATED ORAL at 08:43

## 2025-01-15 RX ADMIN — WHITE PETROLATUM 1 APPLICATION: 1.75 OINTMENT TOPICAL at 10:58

## 2025-01-15 RX ADMIN — INSULIN GLARGINE 13 UNITS: 100 INJECTION, SOLUTION SUBCUTANEOUS at 08:44

## 2025-01-15 RX ADMIN — AMPICILLIN AND SULBACTAM 3 G: 2; 1 INJECTION, POWDER, FOR SOLUTION INTRAVENOUS at 06:09

## 2025-01-15 RX ADMIN — Medication 10 ML: at 08:44

## 2025-01-15 RX ADMIN — AMPICILLIN AND SULBACTAM 3 G: 2; 1 INJECTION, POWDER, FOR SOLUTION INTRAVENOUS at 01:33

## 2025-01-15 RX ADMIN — Medication 10 ML: at 20:07

## 2025-01-15 RX ADMIN — METOPROLOL TARTRATE 12.5 MG: 25 TABLET, FILM COATED ORAL at 11:18

## 2025-01-15 RX ADMIN — APIXABAN 5 MG: 5 TABLET, FILM COATED ORAL at 20:07

## 2025-01-15 RX ADMIN — OXYCODONE HYDROCHLORIDE AND ACETAMINOPHEN 1 TABLET: 5; 325 TABLET ORAL at 10:55

## 2025-01-15 RX ADMIN — APIXABAN 5 MG: 5 TABLET, FILM COATED ORAL at 08:43

## 2025-01-15 RX ADMIN — FUROSEMIDE 40 MG: 10 INJECTION, SOLUTION INTRAMUSCULAR; INTRAVENOUS at 08:43

## 2025-01-15 RX ADMIN — AMOXICILLIN AND CLAVULANATE POTASSIUM 1 TABLET: 875; 125 TABLET, FILM COATED ORAL at 10:55

## 2025-01-15 RX ADMIN — POTASSIUM CHLORIDE 30 MEQ: 750 CAPSULE, EXTENDED RELEASE ORAL at 17:24

## 2025-01-15 RX ADMIN — ATORVASTATIN CALCIUM 10 MG: 10 TABLET, FILM COATED ORAL at 20:07

## 2025-01-15 NOTE — PROGRESS NOTES
[facility]   Hospitalist Progress Note    Date of admission: 12/18/2024  Patient Name: Jose Shaikh  1960  Date: 1/15/2025      Subjective     Chief Complaint   Patient presents with   • Suicidal       Interval Followup: Still some intermittent left hip pain no acute change, no fevers.  No chest pain or palpitations.    Objective     Vitals:   Temp:  [97.3 °F (36.3 °C)-98.2 °F (36.8 °C)] 98.1 °F (36.7 °C)  Heart Rate:  [] 76  Resp:  [16-21] 16  BP: ()/(45-78) 97/57    Physical Exam  Awake conversant in bed, morbidly obese  CTAB no wheezing  Irregularly irregular rate near 80s   Abd soft  Left hip pain at baseline    Result Review:  Vital signs, labs and recent relevant imaging reviewed.      CBC          1/13/2025    01:57 1/14/2025    09:40 1/15/2025    05:40   CBC   WBC 13.00  9.54  6.15    RBC 5.52  6.03  5.18    Hemoglobin 11.3  12.3  10.6    Hematocrit 38.8  43.1  36.3    MCV 70.3  71.5  70.1    MCH 20.5  20.4  20.5    MCHC 29.1  28.5  29.2    RDW 23.6  24.0  24.0    Platelets 202  191  158      CMP          1/13/2025    01:57 1/14/2025    09:40 1/15/2025    05:40   CMP   Glucose 123  154  150    BUN 11  11  11    Creatinine 0.60  0.54  0.50    EGFR 106.5  109.9  112.5    Sodium 139  143  132    Potassium 3.5  3.2  3.2    Chloride 100  100  96    Calcium 8.7  8.5  8.0    BUN/Creatinine Ratio 18.3  20.4  22.0    Anion Gap 14.5  15.5  7.4        •  acetaminophen **OR** acetaminophen **OR** acetaminophen  •  calcium carbonate  •  [Held by provider] cyclobenzaprine  •  dextrose  •  dextrose  •  glucagon (human recombinant)  •  loperamide  •  melatonin  •  ondansetron ODT **OR** ondansetron  •  oxyCODONE-acetaminophen  •  sodium chloride  •  sodium chloride    amoxicillin-clavulanate, 1 tablet, Oral, Q12H  apixaban, 5 mg, Oral, Q12H  atorvastatin, 10 mg, Oral, Nightly  buPROPion, 75 mg, Nasogastric, Q12H  digoxin, 125 mcg, Oral, Daily  ferrous sulfate, 325 mg, Oral, Daily With  Breakfast  furosemide, 40 mg, Intravenous, BID Diuretics  insulin glargine, 13 Units, Subcutaneous, Daily  insulin regular, 2-7 Units, Subcutaneous, TID AC  Lidocaine, 3 patch, Transdermal, Q24H  magnesium oxide, 400 mg, Oral, Daily  metoprolol tartrate, 12.5 mg, Oral, Q8H  mineral oil-hydrophilic petrolatum, 1 Application, Topical, Daily  potassium chloride, 30 mEq, Oral, BID Diuretics  sertraline, 50 mg, Oral, Nightly  sodium chloride, 10 mL, Intravenous, Q12H        XR Chest 1 View    Result Date: 1/13/2025  Bilateral infiltrates are seen. The findings may represent pulmonary edema with vascular congestion. Infectious multifocal pneumonia cannot be excluded.    Portions of this note were completed with a voice recognition program.  Electronically Signed By-Ashwin Lopes MD On:1/13/2025 3:11 AM      CT Head Without Contrast    Result Date: 1/10/2025  Senescent changes without acute abnormality.    Electronically Signed By-Dr. Los Harvey MD On:1/10/2025 6:08 AM       Assessment / Plan     Summary: 64 y.o. w/ dCHF who presented with significant volume overload and dCHF exacerbation/soa as well as initial suicidal ideation. Patient also states that he does not have anybody at home to take care of him. He has chronic leg wounds. He has a history of diabetes atrial fibrillation ulcers. The patient states that he is going to take all his muscle relaxants to kill himself because he cannot go on living like this. He was admitted for further care, psychiatry was consulted. Patient was no longer suicidal after he was admitted to the hospital and bedside sitter was discontinued. He was started on Wellbutrin per psychiatry recommendations. He was diuresed aggressively, King catheter was placed by urology. Wound care following. Can have a voiding trial prior to discharge. Having good urine output with over 25 L of fluid removed since admission.  Unable to go to rehab, unable to have home health agency except at  discharge.  When euvolemic, plan for disposition.  Has been difficult given limited options for patient on safe discharge planning.  Patient remains apprehensive to discharge to a long-term care facility at this time, most appropriate option for him.  However, patient fearful of losing his check when discharging to long-term care facility.  Patient with worsening mental status, repeat UA obtained on the     Assessment/Plan (clinically significant if listed here)  Acute on chronic diastolic congestive heart failure with acute exacerbation  Proteus mirabilis UTI  Concern for aspiration pneumonia with gram-negative's covering with antibiotics  Suicidal ideation  Delirium/confusion suspect component of tramadol/pain medication related  Chronic bilateral foot wounds  Urinary retention in setting of obesity/anasarca/obstruction, resolved with diuresis  Paroxysmal atrial fibrillation, on apixaban   Type 2 diabetes mellitus  Morbid obesity with BMI of 52  Debility with wheelchair dependence  Osteoarthritis of the hip  Chronic venous stasis  Deconditioning  Buried penis  Hypomagnesemia  Hypophosphatemia  Hypokalemia  Hemorrhoids  Microcytic anemia    1/10 CT head no acute findings  Repeat CT head still pending    1/13 blood cultures, respiratory viral panel negative, 1/11 urine culture with Proteus   C. difficile PCR positive with negative antigen suspect colonized    Rate control still variable on telemetry has had few episodes of brief RVR, briefly converted to sinus rhythm at 1 point last night back in A-fib currently, off diltiazem drip currently  Continue digoxin and metoprolol and monitor, continue anticoagulation  Continue IV Lasix likely can switch to oral dose tomorrow follow-up in a.m. monitor I's and O's and renal function, monitoring daily weights as able  Replace potassium and magnesium  Change to augmentin to complete 7d abx for uti / asp pna  Diet able to be advanced to mechanical ground with thin liquids  today per discussion with speech appreciate assistance  Change tramadol to Norco 5 every 8 prn for chronic left hip pain will not increase further given increased confusion sedation issues.  Needs continued weight loss and ultimately outpatient Ortho follow-up once reach target goal has not been candidate for surgery previously given his weight  CT head had negative, monitor mentation  Continue as needed nonnarcotic pain options  cont sertraline, wellbutrin   Monitor diarrhea doing better currently, C. difficile antigen negative as chronic colonization component  Cont wound care   Cont iron therapy  PT/OT  Check a.m. BMP, magnesium, phosphorus  Continue hospitalization at current level of care    Dispo: Patient variable with goals for discharge now wants to go home feels like he will be ready to do this tomorrow will follow-up above testing changes and if rate stable could likely otherwise go home with home health and additional support.  If changes mind would benefit from long-term care placement but currently declines need for this  D/w SW    VTE Prophylaxis:  Pharmacologic & mechanical VTE prophylaxis orders are present.      Level Of Support Discussed With: Patient  Code Status (Patient has no pulse and is not breathing): CPR (Attempt to Resuscitate)  Medical Interventions (Patient has pulse or is breathing): Full Support    Greater than 50 minutes on this encounter including review of labs, imaging, documentation, orders, vitals, monitoring and adjusting treatment and orders as indicated, discussion with the patient sw rn and documenting.

## 2025-01-15 NOTE — PLAN OF CARE
Goal Outcome Evaluation:              Outcome Evaluation: Patient is alert and oriented throughout shift. Patient is on room air with no signs of distress. Pain treated per MAR.  Blood glucose monitored, Cardizem drip turned off. patient Sinus rhythm on monitor. Will continue with patient plan of care.

## 2025-01-15 NOTE — PLAN OF CARE
Goal Outcome Evaluation:           Progress: no change  Outcome Evaluation: No acute changes throughout shift, vss, remains on room air, pain treated per MAR, diet increased to [facility] ground. pt to dc home in AM.

## 2025-01-15 NOTE — PROGRESS NOTES
"Nutrition Services    Patient Name: Joes Shaikh  YOB: 1960  MRN: [id number]  Admission date: 12/18/2024      CLINICAL NUTRITION ASSESSMENT      Reason for Assessment  Follow Up     H&P:  Past Medical History:   Diagnosis Date    Absence of toe of right foot     Acute osteomyelitis of left calcaneus  08/18/2021    Anxiety and depression     Arthritis     Cancer     Chronic pain     STATES HIS PAIN IS 10/10 AAT    Claustrophobia     Corns and callus     Diabetic ulcer of left heel associated with type 2 DM 08/18/2021    Diabetic ulcer of left heel associated with type 2 DM 07/06/2021    Diabetic ulcer of right midfoot associated with type 2 DM 08/18/2021    Difficulty walking     Essential hypertension 08/31/2021    Hammertoe     Hyperlipidemia LDL goal <100 08/31/2021    Ingrown toenail     Obesity     Paroxysmal atrial fibrillation 08/31/2021    Polyneuropathy     Pressure ulcer, stage 1     Tinea unguium     Type 2 diabetes mellitus with polyneuropathy         Current Problems:   Active Hospital Problems    Diagnosis     **CHF exacerbation         Nutrition/Diet History         Narrative   Pt refused EN. 1/14 SLP recs, Purée diet-single sips of thin liquids. Dissatisfied with puree breakfast, stated he doesn't need puree food, wants \"real food\". Drinking milk via straw, large gulps. Pt was cautioned to take small sips, per SLP recs. He stated, he will NOT choke on his food or liquids. Reinforced SLP recs and they are following for potential upgrades. Agreeable to ONS (chocolate, berry).   +constipation, last BM ~1 week ago.  RD to follow per protocol. Advance diet per SLP recs and ONS.      Anthropometrics        Current Height, Weight Height: 188 cm (74\")  Weight: (!) 184 kg (405 lb 3.2 oz)   Current BMI Body mass index is 52.02 kg/m².   BMI Classification Obese Class III   % % (82.2 kg)    Adjusted Body Weight (ABW) 124.1 kg   Weight Hx  Wt Readings from Last 6 Encounters:   01/14/25 0548 " (!) 184 kg (405 lb 3.2 oz)   01/13/25 0431 (!) 186 kg (410 lb 9.6 oz)   01/10/25 0340 (!) 198 kg (435 lb 10.1 oz)   01/09/25 0305 (!) 198 kg (436 lb 8.2 oz)   01/08/25 0502 (!) 197 kg (434 lb 8.4 oz)   01/07/25 0704 (!) 195 kg (430 lb 1.6 oz)   12/19/24 0202 (!) 187 kg (411 lb 13.1 oz)   12/18/24 1455 (!) 195 kg (430 lb 12.5 oz)   12/15/24 1912 (!) 199 kg (438 lb 11.5 oz)   12/15/24 1846 (!) 199 kg (439 lb 6 oz)   12/12/24 0730 (!) 175 kg (384 lb 14.8 oz)   12/06/24 2226 (!) 177 kg (389 lb 12.4 oz)   11/30/24 1436 (!) 174 kg (383 lb 9.6 oz)   11/25/24 1600 (!) 166 kg (365 lb 15.4 oz)          Wt Change Observation UBW of 410-435 lbs  1/15 wt down 5# in 2 days, likely fluid fluctuations/Lasix. Monitor     Estimated/Assessed Needs  Estimated Needs based on: Ideal Body Weight       Energy Requirements 25-30 kcal/kg   EST Needs (kcal/day) 7283-5879 kcal        Protein Requirements 1.2-1.5 g.kg   EST Daily Needs (g/day)  g       Fluid Requirements 25-30 ml/kg    Estimated Needs (mL/day) 0270-1444 ml      Labs/Medications         Pertinent Labs Reviewed.   Results from last 7 days   Lab Units 01/15/25  0540 01/14/25  0940 01/13/25  0157 01/12/25  0601 01/11/25  0554   SODIUM mmol/L 132* 143 139 138 136   POTASSIUM mmol/L 3.2* 3.2* 3.5 3.3* 3.4*   CHLORIDE mmol/L 96* 100 100 100 99   CO2 mmol/L 28.6 27.5 24.5 27.7 26.0   BUN mg/dL 11 11 11 8 7*   CREATININE mg/dL 0.50* 0.54* 0.60* 0.55* 0.51*   CALCIUM mg/dL 8.0* 8.5* 8.7 8.4* 8.0*   BILIRUBIN mg/dL  --   --   --  1.3* 1.2   ALK PHOS U/L  --   --   --  140* 140*   ALT (SGPT) U/L  --   --   --  17 16   AST (SGOT) U/L  --   --   --  35 29   GLUCOSE mg/dL 150* 154* 123* 139* 132*     Results from last 7 days   Lab Units 01/15/25  0540 01/14/25  0940 01/13/25  0157   MAGNESIUM mg/dL 1.7 1.7 1.9   PHOSPHORUS mg/dL 2.6 3.1 3.1   HEMOGLOBIN g/dL 10.6* 12.3* 11.3*   HEMATOCRIT % 36.3* 43.1 38.8     COVID19   Date Value Ref Range Status   01/13/2025 Not Detected Not Detected  - Ref. Range Final     Lab Results   Component Value Date    HGBA1C 4.40 (L) 08/28/2024         Pertinent Medications Reviewed.     Malnutrition Severity Assessment              Nutrition Diagnosis         Nutrition Dx Problem 1 Swallowing difficulty related to decreased ability to consume sufficient energy as evidenced by SLP evaluation (revised).     Nutrition Intervention           Current Nutrition Orders & Evaluation of Intake       Current PO Diet Diet: Regular/House; Texture: Pureed (NDD 1); Fluid Consistency: Thin (IDDSI 0)   Supplement Orders Placed This Encounter      Dietary Nutrition Supplements Magic Cup; berry      Dietary Nutrition Supplements Boost Glucose Control (Glucerna Shake); chocolate           Nutrition Intervention/Prescription        Puree solids, thin liquids (liberalized restrictions).  SLP following, advance per recs.  Bowel regimen, constipation.  Boost GC daily.  Magic cup BID.        Medical Nutrition Therapy/Nutrition Education          Learner     Readiness Patient  Acceptance     Method     Response Explanation  Verbalizes understanding     Monitor/Evaluation        Monitor Per protocol, PO intake, Supplement intake, Swallow function, Diet advancement     Nutrition Discharge Plan         To be determined     Electronically signed by:  Aileen Polanco RD  01/15/25 09:44 EST

## 2025-01-15 NOTE — THERAPY TREATMENT NOTE
Acute Care - Speech Language Pathology   Swallow Treatment Note KEO Dominguez     Patient Name: Jose Shaikh  : 1960  MRN: [id number]  Today's Date: 1/15/2025               Admit Date: 2024    Visit Dx:     ICD-10-CM ICD-9-CM   1. Hypervolemia, unspecified hypervolemia type  E87.70 276.69   2. Congestive heart failure, unspecified HF chronicity, unspecified heart failure type  I50.9 428.0   3. Suicidal ideation  R45.851 V62.84     Patient Active Problem List   Diagnosis    Diabetic ulcer of left heel associated with type 2 DM    Acute osteomyelitis of left calcaneus     Diabetic ulcer of left heel associated with type 2 DM    Diabetic ulcer of right midfoot associated with type 2 DM    Paroxysmal atrial fibrillation    Essential hypertension    Hyperlipidemia LDL goal <100    Cellulitis and abscess of foot    High alkaline phosphatase level    Osteomyelitis    Onychomycosis    Onychocryptosis    Foot pain, bilateral    Osteomyelitis of foot, right, acute    Cellulitis of right foot    Type 2 diabetes mellitus, with long-term current use of insulin    Class 3 severe obesity due to excess calories with serious comorbidity and body mass index (BMI) of 45.0 to 49.9 in adult    Anxiety disorder, unspecified    Claustrophobia    Dependence on wheelchair    Depression, unspecified    Long term (current) use of anticoagulants    Long term (current) use of oral hypoglycemic drugs    Wound of foot    Ulcer of right foot    Orthostatic hypotension    Other chronic osteomyelitis, right ankle and foot    Personal history of nicotine dependence    Thrombocytopenia, unspecified    Unspecified open wound, right foot, initial encounter    Diabetic foot infection    Subacute osteomyelitis of right foot    Right foot pain    Sepsis    Onychomycosis    Foot pain, left    COVID-19 virus detected    Absence of toe of right foot    Corns and callosity    Disability of walking    Fracture    Limb swelling    Polyneuropathy     Pressure ulcer, stage 1    Shortness of breath    Generalized weakness    Debility    Onychomycosis    Noncompliance of patient with dietary regimen    Morbid obesity    COVID-19 virus infection    COVID-19    CHF exacerbation     Past Medical History:   Diagnosis Date    Absence of toe of right foot     Acute osteomyelitis of left calcaneus  08/18/2021    Anxiety and depression     Arthritis     Cancer     Chronic pain     STATES HIS PAIN IS 10/10 AAT    Claustrophobia     Corns and callus     Diabetic ulcer of left heel associated with type 2 DM 08/18/2021    Diabetic ulcer of left heel associated with type 2 DM 07/06/2021    Diabetic ulcer of right midfoot associated with type 2 DM 08/18/2021    Difficulty walking     Essential hypertension 08/31/2021    Hammertoe     Hyperlipidemia LDL goal <100 08/31/2021    Ingrown toenail     Obesity     Paroxysmal atrial fibrillation 08/31/2021    Polyneuropathy     Pressure ulcer, stage 1     Tinea unguium     Type 2 diabetes mellitus with polyneuropathy      Past Surgical History:   Procedure Laterality Date    CYST REMOVAL      center of back; benign    EYE SURGERY      INCISION AND DRAINAGE ABSCESS      back    INCISION AND DRAINAGE LEG Right 12/10/2021    Procedure: INCISION AND DRAINAGE LOWER EXTREMITY;  Surgeon: Ash Leyva DPM;  Location: [facility];  Service: Podiatry;  Laterality: Right;    OTHER SURGICAL HISTORY      Surgical clips left foot    TOE SURGERY Right     Removal of 5th toe    TRANS METATARSAL AMPUTATION Right 12/02/2021    Procedure: AMPUTATION TRANS METATARSAL;  Surgeon: Ash Leyva DPM;  Location: [facility] OR;  Service: Podiatry;  Laterality: Right;    VASCULAR SURGERY      WRIST SURGERY Left     repair of injury     SPEECH PATHOLOGY DYSPHAGIA TREATMENT    Subjective/Behavioral Observations: Patient seen for dysphagia tx.      Current Diet: Puree diet - Thin liquids.     Current Strategies: Patient seen at bedside.   Much more awake and alert.  Voicing at baseline     Treatment received: Patient seen at bedside.  Voicing nearly at baseline.  Much improved oral mucosa clear of dried secretions.  Patient tolerating puréed diet and thin liquids without clinical sign or symptom of aspiration.  Therapeutic trials of soft solids completed with adequate bolus preparation and control.  No oral residue noted after the swallow.  Patient denies globus sensation after completion of swallow.      Results of treatment: As today    Progress toward goals: Progress noted    Barriers to Achieving goals: Medical status    Plan of care:/changes in plan: Upgrade to mechanical soft diet and single sips of thin liquids  Upright for all intake  Slow rate, small bites/drinks  Oral meds in applesauce    EDUCATION  The patient has been educated in the following areas:   Dysphagia (Swallowing Impairment).            Bree Way, SLP  1/15/2025

## 2025-01-16 ENCOUNTER — READMISSION MANAGEMENT (OUTPATIENT)
Dept: CALL CENTER | Facility: HOSPITAL | Age: 65
End: 2025-01-16
Payer: MEDICARE

## 2025-01-16 VITALS
OXYGEN SATURATION: 96 % | WEIGHT: 315 LBS | DIASTOLIC BLOOD PRESSURE: 74 MMHG | BODY MASS INDEX: 40.43 KG/M2 | HEART RATE: 89 BPM | HEIGHT: 74 IN | TEMPERATURE: 98.2 F | SYSTOLIC BLOOD PRESSURE: 119 MMHG | RESPIRATION RATE: 16 BRPM

## 2025-01-16 LAB
ANION GAP SERPL CALCULATED.3IONS-SCNC: 5.8 MMOL/L (ref 5–15)
BUN SERPL-MCNC: 10 MG/DL (ref 8–23)
BUN/CREAT SERPL: 17.9 (ref 7–25)
CALCIUM SPEC-SCNC: 8 MG/DL (ref 8.6–10.5)
CHLORIDE SERPL-SCNC: 98 MMOL/L (ref 98–107)
CO2 SERPL-SCNC: 31.2 MMOL/L (ref 22–29)
CREAT SERPL-MCNC: 0.56 MG/DL (ref 0.76–1.27)
EGFRCR SERPLBLD CKD-EPI 2021: 108.7 ML/MIN/1.73
GLUCOSE BLDC GLUCOMTR-MCNC: 157 MG/DL (ref 70–99)
GLUCOSE BLDC GLUCOMTR-MCNC: 169 MG/DL (ref 70–99)
GLUCOSE SERPL-MCNC: 137 MG/DL (ref 65–99)
MAGNESIUM SERPL-MCNC: 1.8 MG/DL (ref 1.6–2.4)
PHOSPHATE SERPL-MCNC: 2.9 MG/DL (ref 2.5–4.5)
POTASSIUM SERPL-SCNC: 3.2 MMOL/L (ref 3.5–5.2)
SODIUM SERPL-SCNC: 135 MMOL/L (ref 136–145)

## 2025-01-16 PROCEDURE — 82948 REAGENT STRIP/BLOOD GLUCOSE: CPT | Performed by: INTERNAL MEDICINE

## 2025-01-16 PROCEDURE — 82948 REAGENT STRIP/BLOOD GLUCOSE: CPT

## 2025-01-16 PROCEDURE — 63710000001 INSULIN REGULAR HUMAN PER 5 UNITS: Performed by: INTERNAL MEDICINE

## 2025-01-16 PROCEDURE — 83735 ASSAY OF MAGNESIUM: CPT | Performed by: INTERNAL MEDICINE

## 2025-01-16 PROCEDURE — 99239 HOSP IP/OBS DSCHRG MGMT >30: CPT | Performed by: INTERNAL MEDICINE

## 2025-01-16 PROCEDURE — 80048 BASIC METABOLIC PNL TOTAL CA: CPT | Performed by: INTERNAL MEDICINE

## 2025-01-16 PROCEDURE — 84100 ASSAY OF PHOSPHORUS: CPT | Performed by: INTERNAL MEDICINE

## 2025-01-16 PROCEDURE — 63710000001 INSULIN GLARGINE PER 5 UNITS: Performed by: FAMILY MEDICINE

## 2025-01-16 RX ORDER — OXYCODONE AND ACETAMINOPHEN 5; 325 MG/1; MG/1
1 TABLET ORAL EVERY 8 HOURS PRN
Qty: 9 TABLET | Refills: 0 | Status: SHIPPED | OUTPATIENT
Start: 2025-01-16 | End: 2025-01-19

## 2025-01-16 RX ORDER — LIDOCAINE 4 G/G
3 PATCH TOPICAL
Qty: 90 PATCH | Refills: 0 | Status: SHIPPED | OUTPATIENT
Start: 2025-01-17 | End: 2025-02-16

## 2025-01-16 RX ORDER — INSULIN ASPART 100 [IU]/ML
INJECTION, SOLUTION INTRAVENOUS; SUBCUTANEOUS
Qty: 15 ML | Refills: 0 | Status: SHIPPED | OUTPATIENT
Start: 2025-01-16

## 2025-01-16 RX ORDER — BUPROPION HYDROCHLORIDE 150 MG/1
150 TABLET ORAL DAILY
Qty: 30 TABLET | Refills: 0 | Status: SHIPPED | OUTPATIENT
Start: 2025-01-16 | End: 2025-02-15

## 2025-01-16 RX ORDER — BUMETANIDE 1 MG/1
1 TABLET ORAL DAILY
Qty: 60 TABLET | Refills: 0 | Status: SHIPPED | OUTPATIENT
Start: 2025-01-17

## 2025-01-16 RX ORDER — AMOXICILLIN 250 MG
1 CAPSULE ORAL 2 TIMES DAILY PRN
Qty: 60 TABLET | Refills: 0 | Status: SHIPPED | OUTPATIENT
Start: 2025-01-16 | End: 2025-01-30 | Stop reason: HOSPADM

## 2025-01-16 RX ORDER — DIGOXIN 125 MCG
125 TABLET ORAL
Qty: 30 TABLET | Refills: 0 | Status: SHIPPED | OUTPATIENT
Start: 2025-01-16 | End: 2025-02-15

## 2025-01-16 RX ORDER — POTASSIUM CHLORIDE 750 MG/1
40 CAPSULE, EXTENDED RELEASE ORAL DAILY
Status: DISCONTINUED | OUTPATIENT
Start: 2025-01-16 | End: 2025-01-16 | Stop reason: HOSPADM

## 2025-01-16 RX ORDER — METOPROLOL SUCCINATE 25 MG/1
25 TABLET, EXTENDED RELEASE ORAL
Qty: 30 TABLET | Refills: 0 | Status: SHIPPED | OUTPATIENT
Start: 2025-01-16 | End: 2025-01-30 | Stop reason: HOSPADM

## 2025-01-16 RX ORDER — INSULIN GLARGINE 100 [IU]/ML
10 INJECTION, SOLUTION SUBCUTANEOUS DAILY
Qty: 15 ML | Refills: 0 | Status: SHIPPED | OUTPATIENT
Start: 2025-01-16 | End: 2025-04-16

## 2025-01-16 RX ORDER — POTASSIUM CHLORIDE 750 MG/1
20 CAPSULE, EXTENDED RELEASE ORAL DAILY
Qty: 120 CAPSULE | Refills: 0 | Status: SHIPPED | OUTPATIENT
Start: 2025-01-16 | End: 2025-03-17

## 2025-01-16 RX ORDER — BUMETANIDE 1 MG/1
1 TABLET ORAL DAILY
Status: DISCONTINUED | OUTPATIENT
Start: 2025-01-16 | End: 2025-01-16 | Stop reason: HOSPADM

## 2025-01-16 RX ORDER — FERROUS SULFATE 325(65) MG
325 TABLET ORAL
Qty: 90 TABLET | Refills: 0 | Status: SHIPPED | OUTPATIENT
Start: 2025-01-17 | End: 2025-04-17

## 2025-01-16 RX ORDER — POTASSIUM CHLORIDE 750 MG/1
20 CAPSULE, EXTENDED RELEASE ORAL
Status: DISCONTINUED | OUTPATIENT
Start: 2025-01-16 | End: 2025-01-16 | Stop reason: HOSPADM

## 2025-01-16 RX ORDER — SIMVASTATIN 20 MG
20 TABLET ORAL NIGHTLY
Qty: 90 TABLET | Refills: 1 | Status: SHIPPED | OUTPATIENT
Start: 2025-01-16 | End: 2025-04-16

## 2025-01-16 RX ADMIN — WHITE PETROLATUM 1 APPLICATION: 1.75 OINTMENT TOPICAL at 09:47

## 2025-01-16 RX ADMIN — POTASSIUM CHLORIDE 20 MEQ: 750 CAPSULE, EXTENDED RELEASE ORAL at 11:38

## 2025-01-16 RX ADMIN — METOPROLOL TARTRATE 12.5 MG: 25 TABLET, FILM COATED ORAL at 09:47

## 2025-01-16 RX ADMIN — METOPROLOL TARTRATE 12.5 MG: 25 TABLET, FILM COATED ORAL at 03:12

## 2025-01-16 RX ADMIN — INSULIN HUMAN 2 UNITS: 100 INJECTION, SOLUTION PARENTERAL at 11:38

## 2025-01-16 RX ADMIN — AMOXICILLIN AND CLAVULANATE POTASSIUM 1 TABLET: 875; 125 TABLET, FILM COATED ORAL at 08:12

## 2025-01-16 RX ADMIN — INSULIN HUMAN 2 UNITS: 100 INJECTION, SOLUTION PARENTERAL at 08:11

## 2025-01-16 RX ADMIN — FERROUS SULFATE TAB 325 MG (65 MG ELEMENTAL FE) 325 MG: 325 (65 FE) TAB at 08:12

## 2025-01-16 RX ADMIN — POTASSIUM CHLORIDE 40 MEQ: 750 CAPSULE, EXTENDED RELEASE ORAL at 09:47

## 2025-01-16 RX ADMIN — INSULIN GLARGINE 13 UNITS: 100 INJECTION, SOLUTION SUBCUTANEOUS at 08:11

## 2025-01-16 RX ADMIN — DIGOXIN 125 MCG: 125 TABLET ORAL at 11:38

## 2025-01-16 RX ADMIN — APIXABAN 5 MG: 5 TABLET, FILM COATED ORAL at 08:12

## 2025-01-16 RX ADMIN — BUPROPION HYDROCHLORIDE 75 MG: 75 TABLET, FILM COATED ORAL at 08:12

## 2025-01-16 RX ADMIN — BUMETANIDE 1 MG: 1 TABLET ORAL at 09:47

## 2025-01-16 RX ADMIN — OXYCODONE HYDROCHLORIDE AND ACETAMINOPHEN 1 TABLET: 5; 325 TABLET ORAL at 11:38

## 2025-01-16 RX ADMIN — Medication 400 MG: at 08:12

## 2025-01-16 RX ADMIN — Medication 10 ML: at 08:13

## 2025-01-16 NOTE — DISCHARGE SUMMARY
[facility]         HOSPITALIST  DISCHARGE SUMMARY    Patient Name: Jose Shaikh    : 1960    MRN: [id number]    Date of Admission: 2024  Date of Discharge:  25  Primary Care Physician: Provider, No Known    Consults       Date and Time Order Name Status Description    2024  8:54 AM Inpatient Urology Consult Completed     2024  7:52 AM Inpatient Psychiatrist Consult Completed     2024  9:13 PM Inpatient Hospitalist Consult              Final Diagnosis:  Acute on chronic diastolic congestive heart failure with acute exacerbation  Proteus mirabilis UTI  Concern for aspiration pneumonia with gram-negative's covering with antibiotics  Suicidal ideation  Delirium/confusion suspect component of tramadol/pain medication related  Chronic bilateral foot wounds  Urinary retention in setting of obesity/anasarca/obstruction, resolved with diuresis  Paroxysmal atrial fibrillation, on apixaban   Type 2 diabetes mellitus  Morbid obesity with BMI of 52  Debility with wheelchair dependence  Osteoarthritis of the hip  Chronic venous stasis  Deconditioning  Buried penis  Hypomagnesemia  Hypophosphatemia  Hypokalemia  Hemorrhoids  Microcytic anemia    Hospital Course     Hospital Course:  64 y.o. w/ dCHF who presented with significant volume overload and dCHF exacerbation/soa as well as initial suicidal ideation. Patient also states that he does not have anybody at home to take care of him. He has chronic leg wounds. He has a history of diabetes atrial fibrillation ulcers. The patient states that he is going to take all his muscle relaxants to kill himself because he cannot go on living like this. He was admitted for further care, psychiatry was consulted. Patient was no longer suicidal after he was admitted to the hospital and bedside sitter was discontinued. He was started on Wellbutrin per psychiatry recommendations. He was diuresed aggressively, King catheter was placed by  urology. Wound care following. Can have a voiding trial prior to discharge. Having good urine output with over 25 L of fluid removed since admission.  Unable to go to rehab, unable to have home health agency except at discharge.  When euvolemic, plan for disposition.  Has been difficult given limited options for patient on safe discharge planning.  Patient remains apprehensive to discharge to a long-term care facility at this time, most appropriate option for him.  However, patient fearful of losing his check when discharging to long-term care facility.   He ultimately declined placement and opted to return home with home health.      Hospital course complicated by episode of altered mentation and suspected aspiration pna, suspect from tramadol dosing for his left hip pain.  Resolved with holding sedating medications and was stable at baseline by discharge.   Completing course of augmentin for uti and aspiration pna, 3doses left at discharge.  Had brief afib/rvr episode when he was unable to take his medications initially given ams episode.  Rate stable at atime of discharge.      Extensive iv diuresis during hospitalization switched to bumex for discharge, discussed titration parameters with pt/fluid restrictions, need for extra prn dosing based on trend/symptoms and potassium supplementation.  Chf clinic f/u arranged.        Meds to beds, refills of all necessary medications sent, new regimen discussed at length.    Pt remains a continuous high risk for readmission, strongly would benefit from LTC placement but declines and otherwise is medically stable for discharge at this time.      Patient discharged in stable condition with close outpatient follow up. Return precautions and follow up discussed and patient voiced agreement and understanding of treatment plan.     DISCHARGE Follow Up Recommendations for labs and diagnostics:   As above    CODE STATUS:  Code Status and Medical Interventions: CPR (Attempt to  Resuscitate); Full Support   Ordered at: 12/18/24 2212     Level Of Support Discussed With:    Patient     Code Status (Patient has no pulse and is not breathing):    CPR (Attempt to Resuscitate)     Medical Interventions (Patient has pulse or is breathing):    Full Support           Day of Discharge     Vital Signs:  Temp:  [97.2 °F (36.2 °C)-98.2 °F (36.8 °C)] 98.2 °F (36.8 °C)  Heart Rate:  [72-89] 89  Resp:  [16-18] 18  BP: ()/(54-86) 120/55    Physical Exam    Gen: awake, resting in bed, conversant, morbidly obese,   Resp: breathing comfortably on room air, no wheezing,   CV: ir/ir, trace no LE pitting edema notably improving       Discharge Details        Discharge Medications        New Medications        Instructions Start Date   amoxicillin-clavulanate 875-125 MG per tablet  Commonly known as: AUGMENTIN   1 tablet, Oral, Every 12 Hours Scheduled      BASAGLAR KWIKPEN 100 UNIT/ML injection pen   10 Units, Subcutaneous, Daily      bumetanide 1 MG tablet  Commonly known as: BUMEX   1 mg, Oral, Daily   Start Date: January 17, 2025     buPROPion  MG 24 hr tablet  Commonly known as: Wellbutrin XL   150 mg, Oral, Daily      ferrous sulfate 325 (65 FE) MG tablet   325 mg, Oral, Daily With Breakfast   Start Date: January 17, 2025     Lidocaine 4 %   3 patches, Transdermal, Every 24 Hours Scheduled, Remove & Discard patch within 12 hours or as directed by MD   Start Date: January 17, 2025     magnesium oxide 400 tablet tablet  Commonly known as: MAG-OX   400 mg, Oral, Daily   Start Date: January 17, 2025     oxyCODONE-acetaminophen 5-325 MG per tablet  Commonly known as: PERCOCET   1 tablet, Oral, Every 8 Hours PRN      potassium chloride 10 MEQ CR capsule  Commonly known as: MICRO-K   20 mEq, Oral, Daily, Take with your bumex medicine      sennosides-docusate 8.6-50 MG per tablet  Commonly known as: PERICOLACE   1 tablet, Oral, 2 Times Daily PRN             Changes to Medications        Instructions  Start Date   metoprolol succinate XL 25 MG 24 hr tablet  Commonly known as: TOPROL-XL  What changed: how much to take   25 mg, Oral, Every 24 Hours Scheduled             Continue These Medications        Instructions Start Date   acetaminophen 650 MG 8 hr tablet  Commonly known as: TYLENOL   650 mg, Oral, Every 8 Hours PRN      apixaban 5 MG tablet tablet  Commonly known as: ELIQUIS   5 mg, Oral, Every 12 Hours Scheduled      digoxin 125 MCG tablet  Commonly known as: LANOXIN   125 mcg, Oral, Daily Digoxin      Insulin Lispro (1 Unit Dial) 100 UNIT/ML solution pen-injector  Commonly known as: HumaLOG KwikPen   Inject subcutaneously 3 times a day with meals. Use sliding scale. Max daily dose 45units Blood Glucose 150-199- 3units 200-249- 5units 250-299- 8 units 300-349- 10units 350-400- 12units >400- 14units & Call Provider      sertraline 50 MG tablet  Commonly known as: ZOLOFT   50 mg, Oral, Nightly      simvastatin 20 MG tablet  Commonly known as: ZOCOR   20 mg, Oral, Nightly             Stop These Medications      bisacodyl 5 MG EC tablet  Commonly known as: bisacodyl     Jardiance 10 MG tablet tablet  Generic drug: empagliflozin     polyethylene glycol 17 g packet  Commonly known as: MIRALAX     spironolactone 25 MG tablet  Commonly known as: ALDACTONE                Discharge Disposition:  Home-Health Care [facility]    Diet: continue on diet / dietary restrictions from hospitalization     Discharge Activity: advance as tolerated      Additional Instructions for the Follow-ups that You Need to Schedule       Discharge Follow-up with PCP   As directed       Currently Documented PCP:    Provider, No Known    PCP Phone Number:    None     Follow Up Details: 3-5 days hospital f/u                Pertinent  and/or Most Recent Results       LAB RESULTS:      Lab 01/15/25  0540 01/14/25  0940 01/13/25  0157 01/13/25  0130 01/12/25  0601 01/11/25  0554 01/10/25  0639   WBC 6.15 9.54 13.00*  --  9.48 6.48  --    HEMOGLOBIN  10.6* 12.3* 11.3*  --  11.3* 11.3*  --    HEMATOCRIT 36.3* 43.1 38.8  --  39.5 37.6  --    PLATELETS 158 191 202  --  174 157  --    NEUTROS ABS 3.76 7.44*  --   --  7.48*  --   --    IMMATURE GRANS (ABS) 0.02 0.04  --   --  0.05  --   --    LYMPHS ABS 1.38 1.05  --   --  0.95  --   --    MONOS ABS 0.72 0.83  --   --  0.86  --   --    EOS ABS 0.20 0.12  --   --  0.07  --   --    MCV 70.1* 71.5* 70.3*  --  71.2* 70.0*  --    PROCALCITONIN  --  0.10 0.14  --   --   --   --    LACTATE  --   --   --  2.0  --   --  1.2         Lab 01/16/25  0538 01/15/25  0540 01/14/25  0940 01/13/25  0157 01/12/25  0601   SODIUM 135* 132* 143 139 138   POTASSIUM 3.2* 3.2* 3.2* 3.5 3.3*   CHLORIDE 98 96* 100 100 100   CO2 31.2* 28.6 27.5 24.5 27.7   ANION GAP 5.8 7.4 15.5* 14.5 10.3   BUN 10 11 11 11 8   CREATININE 0.56* 0.50* 0.54* 0.60* 0.55*   EGFR 108.7 112.5 109.9 106.5 109.3   GLUCOSE 137* 150* 154* 123* 139*   CALCIUM 8.0* 8.0* 8.5* 8.7 8.4*   MAGNESIUM 1.8 1.7 1.7 1.9 2.0   PHOSPHORUS 2.9 2.6 3.1 3.1 2.9         Lab 01/12/25  0601 01/11/25  0554   TOTAL PROTEIN 6.6 6.2   ALBUMIN 3.1* 3.0*   GLOBULIN 3.5 3.2   ALT (SGPT) 17 16   AST (SGOT) 35 29   BILIRUBIN 1.3* 1.2   ALK PHOS 140* 140*         Lab 01/13/25  0157   PROBNP 287.1                 Lab 01/13/25  0130 01/10/25  0639   PH, ARTERIAL 7.462* 7.472*   PCO2, ARTERIAL 32.9* 35.4   PO2 ART 70.7* 67.8*   O2 SATURATION ART 95.1 94.5*   FIO2 21 21   HCO3 ART 23.5 25.9   BASE EXCESS ART 0.3 2.4*     Brief Urine Lab Results  (Last result in the past 365 days)        Color   Clarity   Blood   Leuk Est   Nitrite   Protein   CREAT   Urine HCG        01/11/25 0938 Dark Yellow   Turbid   Trace   Large (3+)   Positive   30 mg/dL (1+)                 Microbiology Results (last 10 days)       Procedure Component Value - Date/Time    Respiratory Panel PCR w/COVID-19(SARS-CoV-2) KAREN/UMESH/LEONARD/PAD/COR/BETSY In-House, NP Swab in UTM/VTM, 2 HR TAT - Swab, Nasopharynx [[id number]]  (Normal) Collected:  01/13/25 0333    Lab Status: Final result Specimen: Swab from Nasopharynx Updated: 01/13/25 0444     ADENOVIRUS, PCR Not Detected     Coronavirus 229E Not Detected     Coronavirus HKU1 Not Detected     Coronavirus NL63 Not Detected     Coronavirus OC43 Not Detected     COVID19 Not Detected     Human Metapneumovirus Not Detected     Human Rhinovirus/Enterovirus Not Detected     Influenza A PCR Not Detected     Influenza B PCR Not Detected     Parainfluenza Virus 1 Not Detected     Parainfluenza Virus 2 Not Detected     Parainfluenza Virus 3 Not Detected     Parainfluenza Virus 4 Not Detected     RSV, PCR Not Detected     Bordetella pertussis pcr Not Detected     Bordetella parapertussis PCR Not Detected     Chlamydophila pneumoniae PCR Not Detected     Mycoplasma pneumo by PCR Not Detected    Narrative:      In the setting of a positive respiratory panel with a viral infection PLUS a negative procalcitonin without other underlying concern for bacterial infection, consider observing off antibiotics or discontinuation of antibiotics and continue supportive care. If the respiratory panel is positive for atypical bacterial infection (Bordetella pertussis, Chlamydophila pneumoniae, or Mycoplasma pneumoniae), consider antibiotic de-escalation to target atypical bacterial infection.    Blood Culture - Blood, Arm, Right [[id number]]  (Normal) Collected: 01/13/25 0157    Lab Status: Preliminary result Specimen: Blood from Arm, Right Updated: 01/16/25 0230     Blood Culture No growth at 3 days    Narrative:      Less than seven (7) mL's of blood was collected.  Insufficient quantity may yield false negative results.    Blood Culture - Blood, Hand, Right [[id number]]  (Normal) Collected: 01/13/25 0157    Lab Status: Preliminary result Specimen: Blood from Hand, Right Updated: 01/16/25 0230     Blood Culture No growth at 3 days    Narrative:      Less than seven (7) mL's of blood was collected.  Insufficient quantity may yield  false negative results.    Urine Culture - Urine, Urine, Random Void [[id number]]  (Abnormal)  (Susceptibility) Collected: 01/11/25 0938    Lab Status: Final result Specimen: Urine, Random Void Updated: 01/13/25 0851     Urine Culture >100,000 CFU/mL Proteus mirabilis    Narrative:      Colonization of the urinary tract without infection is common. Treatment is discouraged unless the patient is symptomatic, pregnant, or undergoing an invasive urologic procedure.    Susceptibility        Proteus mirabilis      TAMI      Amoxicillin + Clavulanate Susceptible      Ampicillin Susceptible      Ampicillin + Sulbactam Susceptible      Cefazolin (Urine) Susceptible      Cefepime Susceptible      Ceftazidime Susceptible      Ceftriaxone Susceptible      Gentamicin Susceptible      Levofloxacin Susceptible      Nitrofurantoin Resistant      Piperacillin + Tazobactam Susceptible      Trimethoprim + Sulfamethoxazole Susceptible                           Clostridioides difficile Toxin, PCR - Stool, Per Rectum [[id number]]  (Abnormal) Collected: 01/09/25 2025    Lab Status: Final result Specimen: Stool from Per Rectum Updated: 01/09/25 2220     Toxigenic C. difficile by PCR Positive     027 Toxin Presumptive Negative    Narrative:      DNA from a toxigenic strain of C.difficile has been detected. Antigen testing for the presence of free C.difficile toxin is currently in progress, to help determine the clinical significance of this PCR result.     Clostridioides difficile toxin Ag, Reflex - Stool, Per Rectum [[id number]]  (Normal) Collected: 01/09/25 2025    Lab Status: Final result Specimen: Stool from Per Rectum Updated: 01/09/25 2218     C.diff Toxin Ag Negative    Narrative:      DNA from a toxigenic strain of C.difficile was detected, although the free toxin itself was not detected. These findings are consistent with C.difficile colonization and may not reflect actual C.difficile infection. Clinical correlation needed.             RADIOLOGY:    XR Chest 1 View    Result Date: 1/13/2025  Impression: Bilateral infiltrates are seen. The findings may represent pulmonary edema with vascular congestion. Infectious multifocal pneumonia cannot be excluded.    Portions of this note were completed with a voice recognition program.  Electronically Signed By-Ashwin Lopes MD On:1/13/2025 3:11 AM      CT Head Without Contrast    Result Date: 1/10/2025  Impression: Senescent changes without acute abnormality.    Electronically Signed By-Dr. Los Harvey MD On:1/10/2025 6:08 AM      XR Chest 1 View    Result Date: 12/18/2024  Impression: Impression: 1. Cardiomegaly with suspected mild vascular congestion and edema. 2. Bibasilar opacities favoring atelectasis in the setting of low lung volumes. Correlate for signs of infection. Electronically Signed: Aguilar Vila MD  12/18/2024 8:20 PM EST  Workstation ID: [id number]     Results for orders placed during the hospital encounter of 12/18/24    Duplex Venous Upper Extremity - Right CV-READ    Interpretation Summary    Normal right upper extremity venous duplex scan.      Results for orders placed during the hospital encounter of 12/18/24    Duplex Venous Upper Extremity - Right CV-READ    Interpretation Summary    Normal right upper extremity venous duplex scan.      Results for orders placed during the hospital encounter of 10/14/24    Adult Transthoracic Echo Complete W/ Cont if Necessary Per Protocol    Interpretation Summary    Left ventricular systolic function is normal. Left ventricular ejection fraction appears to be 61 - 65%.    Left ventricular wall thickness is consistent with borderline concentric hypertrophy.    Left ventricular diastolic function is consistent with (grade I) impaired relaxation.    There are no hemodynamically significant valvular abnormalities.    This is a technically difficult study.  Contrast used for better endocardial definition.      Labs Pending at  Discharge:  Pending Labs       Order Current Status    Blood Culture - Blood, Arm, Right Preliminary result    Blood Culture - Blood, Hand, Right Preliminary result              Time spent on Discharge including face to face service:  >40 minutes

## 2025-01-16 NOTE — DISCHARGE INSTRUCTIONS
The heart is a muscle that pumps oxygen-rich blood to all parts of the body. When you have heart failure, the heart is not able to pump as well as it should. Blood and fluid may back up into the lungs (congestive heart failure), and some parts of the body don't get enough oxygen-rich blood to work normally. These problems lead to the symptoms of heart failure. Heart failure can occur due to an injury to the heart or from natural processes.  You can control symptoms of heart failure with some lifestyle changes and by following your doctor's advice.    Activity  Ask your healthcare provider about an exercise program. You can benefit from simple activities such as walking or gardening. Exercising most days of the week can make you feel better. Don't be discouraged if your progress is slow at first. Rest as needed. Stop activity if you develop symptoms such as chest pain, lightheadedness, or significant shortness of breath. Find activities that you enjoy, such as brisk walking, dancing, swimming, or gardening. These will help you stay active and strengthen your heart.    Diet  Follow a heart healthy diet. And make sure to limit the salt (sodium) in your diet. Salt causes your body to hold water. This makes your heart work harder as there is more fluid for the heart to pump. Limit your salt to a MAXIMUM of 2 grams of sodium per day by doing the following:    Limit canned, dried, packaged, and fast foods.    Don't add salt to your food.    Season foods with herbs instead of salt.    Watch how much liquids you drink. Do not drink more than ~1.6 liters of fluid a day (this includes sodas, coffee, juices etc.).  Drinking too much can make heart failure worse.     Limit the amount of alcohol you drink. It may harm your heart. Women should have no more than 1 drink a day and men should have no more than 2.    When you eat out, request that your meals have no added salt.    Tobacco  If you smoke, it's very important to quit.  Smoking increases your chances of having a heart attack by harming the blood vessels that provide oxygen to your heart. This makes heart failure worse. Quitting smoking is the number one thing you can do to improve your health. Enroll in a stop-smoking program to improve your chances of success. Talk with your healthcare provider about medicines or nicotine replacement therapy to help you quit smoking. Ask your healthcare provider about smoking cessation support groups.    Medicine  Take your medicines exactly as prescribed. Learn the names and purpose of each of your medicines. Keep an accurate medicine list and current dosages with you at all times. Don't skip doses. If you miss a dose of your medicine, take it as soon as you remember. If you miss a dose and it's almost time for your next dose, just wait and take your next dose at the normal time. Don't take a double dose. If you are unsure, call your doctor's office. Make sure not to mix up your medicines or forget what you've taken the same day.    Weight monitoring  Weigh yourself every day. A sudden weight gain can mean your heart failure is getting worse. Weigh yourself at the same time of day and in the same kind of clothes. Ideally, weigh yourself first thing in the morning after you empty your bladder, but before you eat breakfast. Your healthcare provider will show you how to track your weight. He or she will also discuss with you when you should call if you have a sudden, unexpected increase in your weight.    In general, your healthcare provider may ask you to report if your weight goes up by more than 2 pounds in 1 day,  5 pounds in 1 week, or whatever weight gain you were told by your doctor. This is a sign that you are retaining more fluid than you should be. Clues to weight gain include checking your ankles for swelling, or noticing you are short of breath when you lie down.    Follow-up care  Make a follow-up appointment as directed. Depending on  the type and severity of heart failure you have, you may need follow-up as early as 7 days from hospital discharge. Keep appointments for checkups and lab tests that are needed to check your medicines and condition.    Recognize that your health and even survival depend on your following medical recommendations.    Symptoms  Heart failure can cause a variety of symptoms, including:    Shortness of breath    Trouble breathing at night, especially when you lie down    Swelling in the legs and feet or in the belly (abdomen)    Becoming easily fatigued    Irregular or rapid heartbeat    Weakness or lightheadedness    Swelling of the neck veins    It is important to know what to do if symptoms get worse or if you develop signs of worsening heart failure.         When to call your healthcare provider  Call your healthcare provider right away if you have any of these signs of worsening heart failure:    Sudden weight gain (more than 2 pounds in 1 day or 5 pounds in 1 week, or whatever weight gain you were told to report by your doctor)    Trouble breathing not related to being active    New or increased swelling of your legs or ankles    Swelling or pain in your abdomen    Breathing trouble at night (waking up short of breath, needing more pillows to breathe)    Frequent coughing that doesn't go away    Feeling much more tired than usual    Call 911  Call 911 right away if you have:    Severe shortness of breath, such that you can't catch your breath even while resting    Severe chest pain that does not resolve with rest or nitroglycerin    Pink, foamy mucus with cough and shortness of breath    A continuous rapid or irregular heartbeat    Passing out or fainting    Stroke symptoms such as sudden numbness or weakness on one side of your face, arm, or leg or sudden confusion, trouble speaking or vision changes

## 2025-01-16 NOTE — PLAN OF CARE
Goal Outcome Evaluation:  Plan of Care Reviewed With: patient           Outcome Evaluation: pt alert and oriented x4. pt on RA, no s/s of distress.  wound care completed on shift per order, pictures updated. d/c education completed. will continue with pt plan of care.  Pt being d/c via ambulance.

## 2025-01-16 NOTE — PLAN OF CARE
Goal Outcome Evaluation:  Plan of Care Reviewed With: patient           Outcome Evaluation: pt being d/c via ambulance.

## 2025-01-17 NOTE — OUTREACH NOTE
Prep Survey      Flowsheet Row Responses   [facility] facility patient discharged from? [facility]   Is LACE score < 7 ? No   Eligibility Readm Mgmt   Discharge diagnosis CHF exacerbation   Does the patient have one of the following disease processes/diagnoses(primary or secondary)? CHF   Does the patient have Home health ordered? No   Is there a DME ordered? No   Prep survey completed? Yes            KAPIL ORDONEZ - Registered Nurse

## 2025-01-18 LAB
BACTERIA SPEC AEROBE CULT: NORMAL
BACTERIA SPEC AEROBE CULT: NORMAL

## 2025-01-21 ENCOUNTER — APPOINTMENT (OUTPATIENT)
Dept: GENERAL RADIOLOGY | Facility: HOSPITAL | Age: 65
End: 2025-01-21
Payer: MEDICARE

## 2025-01-21 ENCOUNTER — HOSPITAL ENCOUNTER (OUTPATIENT)
Facility: HOSPITAL | Age: 65
Discharge: NURSING, REHAB OR LONG-TERM CARE | End: 2025-01-30
Attending: EMERGENCY MEDICINE | Admitting: STUDENT IN AN ORGANIZED HEALTH CARE EDUCATION/TRAINING PROGRAM
Payer: MEDICARE

## 2025-01-21 DIAGNOSIS — Z74.09 IMPAIRED MOBILITY AND ADLS: Primary | ICD-10-CM

## 2025-01-21 DIAGNOSIS — M16.11 OSTEOARTHRITIS OF RIGHT HIP, UNSPECIFIED OSTEOARTHRITIS TYPE: ICD-10-CM

## 2025-01-21 DIAGNOSIS — E66.9 OBESITY, UNSPECIFIED CLASS, UNSPECIFIED OBESITY TYPE, UNSPECIFIED WHETHER SERIOUS COMORBIDITY PRESENT: ICD-10-CM

## 2025-01-21 DIAGNOSIS — T14.8XXA MULTIPLE WOUNDS OF SKIN: ICD-10-CM

## 2025-01-21 DIAGNOSIS — R26.2 DIFFICULTY IN WALKING: ICD-10-CM

## 2025-01-21 DIAGNOSIS — Z78.9 IMPAIRED MOBILITY AND ADLS: Primary | ICD-10-CM

## 2025-01-21 DIAGNOSIS — R26.2 DIFFICULTY WALKING: ICD-10-CM

## 2025-01-21 LAB
ACANTHOCYTES BLD QL SMEAR: NORMAL
ALBUMIN SERPL-MCNC: 3.1 G/DL (ref 3.5–5.2)
ALBUMIN/GLOB SERPL: 0.8 G/DL
ALP SERPL-CCNC: 225 U/L (ref 39–117)
ALT SERPL W P-5'-P-CCNC: 22 U/L (ref 1–41)
ANION GAP SERPL CALCULATED.3IONS-SCNC: 14.2 MMOL/L (ref 5–15)
AST SERPL-CCNC: 42 U/L (ref 1–40)
BACTERIA UR QL AUTO: ABNORMAL /HPF
BASOPHILS # BLD AUTO: 0.08 10*3/MM3 (ref 0–0.2)
BASOPHILS NFR BLD AUTO: 1 % (ref 0–1.5)
BILIRUB SERPL-MCNC: 1.4 MG/DL (ref 0–1.2)
BILIRUB UR QL STRIP: ABNORMAL
BUN SERPL-MCNC: 6 MG/DL (ref 8–23)
BUN/CREAT SERPL: 10.2 (ref 7–25)
CALCIUM SPEC-SCNC: 8.4 MG/DL (ref 8.6–10.5)
CHLORIDE SERPL-SCNC: 94 MMOL/L (ref 98–107)
CK SERPL-CCNC: 85 U/L (ref 20–200)
CLARITY UR: CLEAR
CO2 SERPL-SCNC: 26.8 MMOL/L (ref 22–29)
COD CRY URNS QL: ABNORMAL /HPF
COLOR UR: ABNORMAL
CREAT SERPL-MCNC: 0.59 MG/DL (ref 0.76–1.27)
DEPRECATED RDW RBC AUTO: 62.3 FL (ref 37–54)
DIGOXIN SERPL-MCNC: 0.32 NG/ML (ref 0.6–1.2)
EGFRCR SERPLBLD CKD-EPI 2021: 107 ML/MIN/1.73
ELLIPTOCYTES BLD QL SMEAR: NORMAL
EOSINOPHIL # BLD AUTO: 0.06 10*3/MM3 (ref 0–0.4)
EOSINOPHIL NFR BLD AUTO: 0.7 % (ref 0.3–6.2)
ERYTHROCYTE [DISTWIDTH] IN BLOOD BY AUTOMATED COUNT: 25.6 % (ref 12.3–15.4)
GLOBULIN UR ELPH-MCNC: 3.9 GM/DL
GLUCOSE SERPL-MCNC: 177 MG/DL (ref 65–99)
GLUCOSE UR STRIP-MCNC: ABNORMAL MG/DL
HCT VFR BLD AUTO: 44.1 % (ref 37.5–51)
HELMET CELLS: NORMAL
HGB BLD-MCNC: 13 G/DL (ref 13–17.7)
HGB UR QL STRIP.AUTO: NEGATIVE
HOLD SPECIMEN: NORMAL
HYALINE CASTS UR QL AUTO: ABNORMAL /LPF
HYPOCHROMIA BLD QL: NORMAL
IMM GRANULOCYTES # BLD AUTO: 0.03 10*3/MM3 (ref 0–0.05)
IMM GRANULOCYTES NFR BLD AUTO: 0.4 % (ref 0–0.5)
KETONES UR QL STRIP: ABNORMAL
LEUKOCYTE ESTERASE UR QL STRIP.AUTO: ABNORMAL
LYMPHOCYTES # BLD AUTO: 1.54 10*3/MM3 (ref 0.7–3.1)
LYMPHOCYTES NFR BLD AUTO: 18.6 % (ref 19.6–45.3)
MCH RBC QN AUTO: 21 PG (ref 26.6–33)
MCHC RBC AUTO-ENTMCNC: 29.5 G/DL (ref 31.5–35.7)
MCV RBC AUTO: 71.4 FL (ref 79–97)
MONOCYTES # BLD AUTO: 0.6 10*3/MM3 (ref 0.1–0.9)
MONOCYTES NFR BLD AUTO: 7.3 % (ref 5–12)
NEUTROPHILS NFR BLD AUTO: 5.95 10*3/MM3 (ref 1.7–7)
NEUTROPHILS NFR BLD AUTO: 72 % (ref 42.7–76)
NITRITE UR QL STRIP: NEGATIVE
NRBC BLD AUTO-RTO: 0 /100 WBC (ref 0–0.2)
PH UR STRIP.AUTO: 5.5 [PH] (ref 5–8)
PLATELET # BLD AUTO: 221 10*3/MM3 (ref 140–450)
PMV BLD AUTO: 9.9 FL (ref 6–12)
POTASSIUM SERPL-SCNC: 3.2 MMOL/L (ref 3.5–5.2)
PROT SERPL-MCNC: 7 G/DL (ref 6–8.5)
PROT UR QL STRIP: NEGATIVE
RBC # BLD AUTO: 6.18 10*6/MM3 (ref 4.14–5.8)
RBC # UR STRIP: ABNORMAL /HPF
REF LAB TEST METHOD: ABNORMAL
SMALL PLATELETS BLD QL SMEAR: ADEQUATE
SODIUM SERPL-SCNC: 135 MMOL/L (ref 136–145)
SP GR UR STRIP: 1.02 (ref 1–1.03)
SQUAMOUS #/AREA URNS HPF: ABNORMAL /HPF
UROBILINOGEN UR QL STRIP: ABNORMAL
WBC # UR STRIP: ABNORMAL /HPF
WBC MORPH BLD: NORMAL
WBC NRBC COR # BLD AUTO: 8.26 10*3/MM3 (ref 3.4–10.8)
WHOLE BLOOD HOLD COAG: NORMAL
YEAST URNS QL MICRO: ABNORMAL /HPF

## 2025-01-21 PROCEDURE — 85025 COMPLETE CBC W/AUTO DIFF WBC: CPT

## 2025-01-21 PROCEDURE — 80162 ASSAY OF DIGOXIN TOTAL: CPT

## 2025-01-21 PROCEDURE — 82550 ASSAY OF CK (CPK): CPT

## 2025-01-21 PROCEDURE — 99285 EMERGENCY DEPT VISIT HI MDM: CPT

## 2025-01-21 PROCEDURE — 36415 COLL VENOUS BLD VENIPUNCTURE: CPT

## 2025-01-21 PROCEDURE — 85007 BL SMEAR W/DIFF WBC COUNT: CPT

## 2025-01-21 PROCEDURE — 81001 URINALYSIS AUTO W/SCOPE: CPT

## 2025-01-21 PROCEDURE — 80053 COMPREHEN METABOLIC PANEL: CPT

## 2025-01-21 PROCEDURE — 71045 X-RAY EXAM CHEST 1 VIEW: CPT

## 2025-01-21 PROCEDURE — 87086 URINE CULTURE/COLONY COUNT: CPT

## 2025-01-21 RX ORDER — NICOTINE POLACRILEX 4 MG
15 LOZENGE BUCCAL
Status: CANCELLED | OUTPATIENT
Start: 2025-01-21

## 2025-01-21 RX ORDER — IBUPROFEN 600 MG/1
1 TABLET ORAL
Status: CANCELLED | OUTPATIENT
Start: 2025-01-21

## 2025-01-21 RX ORDER — BUMETANIDE 1 MG/1
1 TABLET ORAL DAILY
Status: DISCONTINUED | OUTPATIENT
Start: 2025-01-21 | End: 2025-01-22

## 2025-01-21 RX ORDER — INSULIN LISPRO 100 [IU]/ML
1-200 INJECTION, SOLUTION INTRAVENOUS; SUBCUTANEOUS AS NEEDED
Status: DISCONTINUED | OUTPATIENT
Start: 2025-01-21 | End: 2025-01-30 | Stop reason: HOSPADM

## 2025-01-21 RX ORDER — DEXTROSE MONOHYDRATE 25 G/50ML
25 INJECTION, SOLUTION INTRAVENOUS
Status: CANCELLED | OUTPATIENT
Start: 2025-01-21

## 2025-01-21 RX ORDER — BUPROPION HYDROCHLORIDE 150 MG/1
150 TABLET, EXTENDED RELEASE ORAL EVERY 12 HOURS SCHEDULED
Status: DISCONTINUED | OUTPATIENT
Start: 2025-01-21 | End: 2025-01-22

## 2025-01-21 RX ORDER — DEXTROSE MONOHYDRATE 25 G/50ML
10-50 INJECTION, SOLUTION INTRAVENOUS
Status: DISCONTINUED | OUTPATIENT
Start: 2025-01-21 | End: 2025-01-30 | Stop reason: HOSPADM

## 2025-01-21 RX ORDER — METOPROLOL SUCCINATE 25 MG/1
25 TABLET, EXTENDED RELEASE ORAL
Status: DISCONTINUED | OUTPATIENT
Start: 2025-01-21 | End: 2025-01-22

## 2025-01-21 RX ORDER — INSULIN LISPRO 100 [IU]/ML
1-200 INJECTION, SOLUTION INTRAVENOUS; SUBCUTANEOUS
Status: DISCONTINUED | OUTPATIENT
Start: 2025-01-21 | End: 2025-01-22

## 2025-01-21 RX ORDER — NICOTINE POLACRILEX 4 MG
15 LOZENGE BUCCAL
Status: DISCONTINUED | OUTPATIENT
Start: 2025-01-21 | End: 2025-01-22 | Stop reason: SDUPTHER

## 2025-01-21 RX ORDER — IBUPROFEN 600 MG/1
1 TABLET ORAL
Status: DISCONTINUED | OUTPATIENT
Start: 2025-01-21 | End: 2025-01-30 | Stop reason: HOSPADM

## 2025-01-21 RX ORDER — DIGOXIN 125 MCG
125 TABLET ORAL
Status: DISCONTINUED | OUTPATIENT
Start: 2025-01-21 | End: 2025-01-22

## 2025-01-21 RX ORDER — POTASSIUM CHLORIDE 750 MG/1
40 CAPSULE, EXTENDED RELEASE ORAL ONCE
Status: COMPLETED | OUTPATIENT
Start: 2025-01-21 | End: 2025-01-22

## 2025-01-21 NOTE — LETTER
[facility]  [address]  [place] [address]  [phone]        January 27, 2025      Patient: Jose Shaikh  YOB: 1960  Date of Visit: 1/21/2025      I have attached PT/OT notes         KIMBERLY Flaherty

## 2025-01-21 NOTE — LETTER
[facility]  [address]  [place] [address]  [phone]        January 28, 2025      Patient: Jose Shaikh  YOB: 1960  Date of Visit: 1/21/2025      Good morning, I have attached updated MD progress note and wound care orders. P-ease contact me at [phone] for any additional questions.     Thank you.         KIMBERLY Flaherty

## 2025-01-21 NOTE — ED NOTES
Patient arrived via [facility] with c.o FTT.  Reportedly patient has been laying in his own feces/urine for several days.  Pt lives home alone. Reports he is bed bound and comes to the ED to be cleaned up.

## 2025-01-21 NOTE — ED PROVIDER NOTES
Time: 3:45 PM EST  Date of encounter:  1/21/2025  Independent Historian/Clinical History and Information was obtained by:   Patient    History is limited by: N/A    Chief Complaint: failure to thrive      History of Present Illness:  Patient is a 64 y.o. year old male who presents to the emergency department for evaluation of unable to get out of bed and failure to thrive.  Patient reports has been in his bed at home for the last 3 days.  Patient was found via EMS in feces and urine.  Currently resides at home alone.  Significant prior medical history of uncontrolled diabetes with right lower extremity metatarsal amputation, bilateral lower extremity chronic wounds, hyperlipidemia, morbid obesity.  Reports that he has been taking some of his meds that he had close to on but some he believes he has not missed.  Reports he has not been checking his glucose.    Patient Care Team  Primary Care Provider: Provider, No Known    Past Medical History:     Allergies   Allergen Reactions    Adhesive Tape Rash     Past Medical History:   Diagnosis Date    Absence of toe of right foot     Acute osteomyelitis of left calcaneus  08/18/2021    Anxiety and depression     Arthritis     Cancer     Chronic pain     STATES HIS PAIN IS 10/10 AAT    Claustrophobia     Corns and callus     Diabetic ulcer of left heel associated with type 2 DM 08/18/2021    Diabetic ulcer of left heel associated with type 2 DM 07/06/2021    Diabetic ulcer of right midfoot associated with type 2 DM 08/18/2021    Difficulty walking     Essential hypertension 08/31/2021    Hammertoe     Hyperlipidemia LDL goal <100 08/31/2021    Ingrown toenail     Obesity     Paroxysmal atrial fibrillation 08/31/2021    Polyneuropathy     Pressure ulcer, stage 1     Tinea unguium     Type 2 diabetes mellitus with polyneuropathy      Past Surgical History:   Procedure Laterality Date    CYST REMOVAL      center of back; benign    EYE SURGERY      INCISION AND DRAINAGE ABSCESS       back    INCISION AND DRAINAGE LEG Right 12/10/2021    Procedure: INCISION AND DRAINAGE LOWER EXTREMITY;  Surgeon: Ash Leyva DPM;  Location: [facility] MAIN OR;  Service: Podiatry;  Laterality: Right;    OTHER SURGICAL HISTORY      Surgical clips left foot    TOE SURGERY Right     Removal of 5th toe    TRANS METATARSAL AMPUTATION Right 12/02/2021    Procedure: AMPUTATION TRANS METATARSAL;  Surgeon: Ash Leyva DPM;  Location: [facility] MAIN OR;  Service: Podiatry;  Laterality: Right;    VASCULAR SURGERY      WRIST SURGERY Left     repair of injury     Family History   Problem Relation Age of Onset    Hearing loss Mother     Depression Mother     Arthritis Mother     Heart disease Mother     Heart disease Father     Cancer Father         Unspecified    Cancer Sister     Depression Sister     Depression Sister     Hearing loss Brother     Heart disease Brother     Diabetes Paternal Grandmother     Learning disabilities Nephew     Drug abuse Nephew     COPD Nephew     Alcohol abuse Nephew     Suicide Attempts Nephew        Home Medications:  Prior to Admission medications    Medication Sig Start Date End Date Taking? Authorizing Provider   acetaminophen (TYLENOL) 650 MG 8 hr tablet Take 1 tablet by mouth Every 8 (Eight) Hours As Needed for Mild Pain.    Provider, MD Laura   apixaban (ELIQUIS) 5 MG tablet tablet Take 1 tablet by mouth Every 12 (Twelve) Hours for 30 days. 1/16/25 2/15/25  John Emerson MD   bumetanide (BUMEX) 1 MG tablet Take 1 tablet by mouth Daily. 1/17/25   John Emerson MD   buPROPion XL (Wellbutrin XL) 150 MG 24 hr tablet Take 1 tablet by mouth Daily for 30 days. 1/16/25 2/15/25  John Emerson MD   digoxin (LANOXIN) 125 MCG tablet Take 1 tablet by mouth Daily for 30 days. 1/16/25 2/15/25  John Emerson MD   ferrous sulfate 325 (65 FE) MG tablet Take 1 tablet by mouth Daily With Breakfast for 90 days. 1/17/25 4/17/25  John Emerson MD   insulin aspart (NovoLOG  FlexPen) 100 UNIT/ML solution pen-injector sc pen Inject subq 3 times a day with meals. Use sliding scale. Max daily dose 45u -199- 3u  200-249- 5u 250-299- 8u 300-349- 10u 350-400- 12u >400- 14units & Call Provider 25   John Emerson MD   Insulin Glargine (BASAGLAR KWIKPEN) 100 UNIT/ML injection pen Inject 10 Units under the skin into the appropriate area as directed Daily for 60 days. 25  John Emerson MD   Lidocaine 4 % Place 3 patches on the skin as directed by provider Daily for 30 days. Remove & Discard patch within 12 hours or as directed by MD 25  John Emerson MD   magnesium oxide (MAG-OX) 400 tablet tablet Take 1 tablet by mouth Daily for 60 days. 1/17/25 3/18/25  John Emerson MD   metoprolol succinate XL (TOPROL-XL) 25 MG 24 hr tablet Take 1 tablet by mouth Daily for 30 days. 1/16/25 2/15/25  John Emerson MD   potassium chloride (MICRO-K) 10 MEQ CR capsule Take 2 capsules by mouth Daily for 60 days. Take with your bumex medicine 1/16/25 3/17/25  John Emerson MD   sennosides-docusate (senna-docusate sodium) 8.6-50 MG per tablet Take 1 tablet by mouth 2 (Two) Times a Day As Needed for Constipation. 25   John Emerson MD   sertraline (ZOLOFT) 50 MG tablet Take 1 tablet by mouth Every Night for 30 days. 1/16/25 2/15/25  John Emerson MD   simvastatin (ZOCOR) 20 MG tablet Take 1 tablet by mouth Every Night for 30 days. 25  John Emerson MD        Social History:   Social History     Tobacco Use    Smoking status: Former     Current packs/day: 0.00     Types: Cigarettes     Quit date: 1990     Years since quittin.4    Smokeless tobacco: Never    Tobacco comments:     quit at age 30   Vaping Use    Vaping status: Never Used   Substance Use Topics    Alcohol use: Not Currently     Comment: rare    Drug use: Not Currently     Types: Marijuana     Comment: occasionally, EVERY 3-4 MONTH         Review of Systems:  Review of Systems  "  Constitutional:  Negative for chills and fever.   HENT:  Negative for congestion, ear pain and sore throat.    Eyes:  Negative for pain.   Respiratory:  Negative for cough, chest tightness and shortness of breath.    Cardiovascular:  Positive for leg swelling. Negative for chest pain.   Gastrointestinal:  Negative for abdominal pain, diarrhea, nausea and vomiting.   Genitourinary:  Negative for flank pain and hematuria.   Musculoskeletal:  Positive for arthralgias. Negative for joint swelling.   Skin:  Positive for rash and wound. Negative for pallor.   Neurological:  Negative for seizures and headaches.   All other systems reviewed and are negative.       Physical Exam:  /73 (BP Location: Left arm, Patient Position: Lying)   Pulse 57   Temp 97.7 °F (36.5 °C) (Oral)   Resp 22   Ht 188 cm (74\")   Wt 133 kg (293 lb 3.4 oz)   SpO2 100%   BMI 37.65 kg/m²     Physical Exam  Vitals and nursing note reviewed.   Constitutional:       General: He is not in acute distress.     Appearance: Normal appearance. He is obese. He is not ill-appearing or toxic-appearing.   HENT:      Head: Normocephalic and atraumatic.      Mouth/Throat:      Mouth: Mucous membranes are moist.   Eyes:      General: No scleral icterus.     Extraocular Movements: Extraocular movements intact.      Conjunctiva/sclera: Conjunctivae normal.   Cardiovascular:      Rate and Rhythm: Normal rate and regular rhythm.      Pulses: Normal pulses.      Heart sounds: Normal heart sounds.   Pulmonary:      Effort: Pulmonary effort is normal. No respiratory distress.      Breath sounds: Normal breath sounds.   Abdominal:      General: Abdomen is flat. There is no distension.      Palpations: Abdomen is soft.      Tenderness: There is no abdominal tenderness.   Musculoskeletal:         General: Normal range of motion.      Cervical back: Normal range of motion and neck supple.      Right lower leg: Edema present.      Left lower leg: Edema present. "   Skin:     General: Skin is warm and dry.      Coloration: Skin is not cyanotic.      Findings: Bruising and rash (rash and open lesions to BUE that appear to be excoriated from where patient has scratched and picked) present.   Neurological:      Mental Status: He is alert and oriented to person, place, and time. Mental status is at baseline.      Sensory: No sensory deficit.   Psychiatric:         Attention and Perception: Attention and perception normal.         Mood and Affect: Mood normal.         Behavior: Behavior normal.         Thought Content: Thought content normal.         Judgment: Judgment normal.                    Medical Decision Making:      Comorbidities that affect care:    A-fib, arthritis, difficulty ambulating, hypertension, claustrophobia, hyperlipidemia, Diabetes, Obesity    External Notes reviewed:    Hospital Discharge Summary: Previous hospital discharge summary on 1/16/2025 reviewed.      The following orders were placed and all results were independently analyzed by me:  Orders Placed This Encounter   Procedures    Urine Culture - Urine,    Clostridioides difficile Toxin - Stool, Per Rectum    Clostridioides difficile Toxin, PCR - Stool, Per Rectum    Clostridioides difficile toxin Ag, Reflex - Stool,    XR Chest 1 View    Comprehensive Metabolic Panel    CK    Urinalysis With Culture If Indicated - Urine, Clean Catch    Digoxin Level    CBC Auto Differential    Scan Slide    Urinalysis, Microscopic Only - Urine, Clean Catch    Basic Metabolic Panel    Magnesium    Phosphorus    Calcium    Hepatic Function Panel    Diet: Diabetic; Consistent Carbohydrate; Fluid Consistency: Thin (IDDSI 0)    Initiate Glucommander™ SQ    RN to Order STAT Glucose (Lab Performed) for POC Glucose <10 or >600    Vital Signs    Intake & Output    Weigh Patient    Oral Care    Saline Lock & Maintain IV Access    Inpatient Hospitalist Consult    IP General Consult (Use specialty-specific consult if known)     IP General Consult (Use specialty-specific consult if known)    Inpatient Nutrition Consult    Patient is on Glucommander    PT Consult: Eval & Treat Functional Mobility Below Baseline    POC Glucose PRN    POC Glucose 4x Daily Before Meals & at Bedtime    POC Glucose Once    POC Glucose Once    POC Glucose 4x Daily Before Meals & at Bedtime    Wound Ostomy Eval & Treat    Insert Peripheral IV    Outpatient In A Bed    CBC & Differential    Extra Tubes    Gold Top - SST    Light Blue Top    CBC & Differential       Medications Given in the Emergency Department:  Medications   magnesium oxide (MAG-OX) tablet 400 mg (400 mg Oral Given 1/22/25 1010)   insulin lispro (humaLOG) injection 1-200 Units (has no administration in time range)   dextrose (D50W) (25 g/50 mL) IV injection 10-50 mL (has no administration in time range)   glucagon (GLUCAGEN) injection 1 mg (has no administration in time range)   sodium chloride 0.9 % flush 10 mL (has no administration in time range)   sodium chloride 0.9 % flush 10 mL (has no administration in time range)   sodium chloride 0.9 % infusion 40 mL (has no administration in time range)   sennosides-docusate (PERICOLACE) 8.6-50 MG per tablet 2 tablet (has no administration in time range)     And   polyethylene glycol (MIRALAX) packet 17 g (has no administration in time range)     And   bisacodyl (DULCOLAX) EC tablet 5 mg (has no administration in time range)     And   bisacodyl (DULCOLAX) suppository 10 mg (has no administration in time range)   apixaban (ELIQUIS) tablet 5 mg (has no administration in time range)   bumetanide (BUMEX) tablet 1 mg (has no administration in time range)   buPROPion XL (WELLBUTRIN XL) 24 hr tablet 150 mg (has no administration in time range)   metoprolol succinate XL (TOPROL-XL) 24 hr tablet 25 mg (has no administration in time range)   ferrous sulfate tablet 325 mg (has no administration in time range)   sertraline (ZOLOFT) tablet 50 mg (has no  administration in time range)   atorvastatin (LIPITOR) tablet 10 mg (has no administration in time range)   digoxin (LANOXIN) tablet 125 mcg (has no administration in time range)   insulin glargine (LANTUS, SEMGLEE) injection 10 Units (has no administration in time range)   dextrose (GLUTOSE) oral gel 15 g (has no administration in time range)   dextrose (D50W) (25 g/50 mL) IV injection 25 g (has no administration in time range)   Insulin Lispro (humaLOG) injection 2-9 Units (has no administration in time range)   apixaban (ELIQUIS) tablet 5 mg (5 mg Oral Given 1/22/25 0016)   potassium chloride (MICRO-K/KLOR-CON) CR capsule (40 mEq Oral Given 1/22/25 0015)        ED Course:    ED Course as of 01/22/25 1615   Tue Jan 21, 2025   1720 X-ray reviewed.  No acute findings.  CMP reviewed.  Evidence of hyperglycemia of 177 without evidence of anion gap or bicarb abnormalities.  Low suspicion for DKA HHS.  Evidence of mild electrolyte derangements including hypokalemia of 3.2 chloride 94. [CB]   1844 Spoke with Dr. Crisostomo hospitalist.  Reports that patient has longstanding history of extended hospital inpatient admissions with ongoing difficulties with LTC placement secondary to patient request.  I discussed with Dr. Finney possibility of disposition from the ED tomorrow morning with the LCSW as he will need LTC 2/2 inability to complete ADLs. [CB]   1949 [facility] reports that patient still within window of LTC [CB]   2321 CBC reviewed.  No acute findings. [CB]   2321 Digoxin level reviewed.  Evidence of subtherapeutic levels consistent with patient's history of inability to take p.o. medications at home secondary to inability to ambulate [CB]   2321 Urinalysis reviewed.  Evidence of mild dehydration present without evidence of acute UTI.  IV medications deferred at this time as patient appears hypervolemic with bilateral lower extremity edema.  [CB]   Wed Jan 22, 2025   0714 Report received from off going  VALENTÍN GUAN  [MS]   0930 ER CSW Jaquan is faxing requested information to facility in [place] that accepted pt last week to see if he can still be transferred there [MS]   1231 Pt continues to be (assessment with jaquan) [MS]   1323 Hospital administration ... [MS]   1323 Inpatient physical therapy at bedside at this time [MS]   1345 Pt. Has been accepted by hospitalist Dr. Lam [MS]      ED Course User Index  [CB] Wild Sarabia PA-C  [MS] Leah Carrasco APRN       Labs:    Lab Results (last 24 hours)       Procedure Component Value Units Date/Time    Urinalysis With Culture If Indicated - Urine, Clean Catch [[id number]]  (Abnormal) Collected: 01/21/25 1703    Specimen: Urine, Clean Catch Updated: 01/21/25 1721     Color, UA Dark Yellow     Appearance, UA Clear     pH, UA 5.5     Specific Gravity, UA 1.024     Glucose,  mg/dL (Trace)     Ketones, UA 15 mg/dL (1+)     Bilirubin, UA Small (1+)     Blood, UA Negative     Protein, UA Negative     Leuk Esterase, UA Small (1+)     Nitrite, UA Negative     Urobilinogen, UA 1.0 E.U./dL    Narrative:      In absence of clinical symptoms, the presence of pyuria, bacteria, and/or nitrites on the urinalysis result does not correlate with infection.    Urinalysis, Microscopic Only - Urine, Clean Catch [[id number]]  (Abnormal) Collected: 01/21/25 1703    Specimen: Urine, Clean Catch Updated: 01/21/25 1830     RBC, UA 0-2 /HPF      WBC, UA 6-10 /HPF      Bacteria, UA Trace /HPF      Squamous Epithelial Cells, UA 0-2 /HPF      Yeast, UA Small/1+ Budding Yeast /HPF      Hyaline Casts, UA 0-2 /LPF      Calcium Oxalate Crystals, UA Small/1+ /HPF      Methodology Manual Light Microscopy    Urine Culture - Urine, Urine, Clean Catch [[id number]] Collected: 01/21/25 1703    Specimen: Urine, Clean Catch Updated: 01/22/25 1141     Urine Culture 25,000 CFU/mL Normal Urogenital Berenice    Narrative:      Colonization of the urinary tract without infection is common. Treatment is  discouraged unless the patient is symptomatic, pregnant, or undergoing an invasive urologic procedure.    CBC & Differential [[id number]]  (Abnormal) Collected: 01/21/25 1707    Specimen: Blood Updated: 01/21/25 1753    Narrative:      The following orders were created for panel order CBC & Differential.  Procedure                               Abnormality         Status                     ---------                               -----------         ------                     CBC Auto Differential[[id number]]        Abnormal            Final result               Scan Slide[[id number]]                                       Final result                 Please view results for these tests on the individual orders.    Comprehensive Metabolic Panel [[id number]]  (Abnormal) Collected: 01/21/25 1707    Specimen: Blood Updated: 01/21/25 1730     Glucose 177 mg/dL      BUN 6 mg/dL      Creatinine 0.59 mg/dL      Sodium 135 mmol/L      Potassium 3.2 mmol/L      Chloride 94 mmol/L      CO2 26.8 mmol/L      Calcium 8.4 mg/dL      Total Protein 7.0 g/dL      Albumin 3.1 g/dL      ALT (SGPT) 22 U/L      AST (SGOT) 42 U/L      Alkaline Phosphatase 225 U/L      Total Bilirubin 1.4 mg/dL      Globulin 3.9 gm/dL      A/G Ratio 0.8 g/dL      BUN/Creatinine Ratio 10.2     Anion Gap 14.2 mmol/L      eGFR 107.0 mL/min/1.73     Narrative:      GFR Categories in Chronic Kidney Disease (CKD)      GFR Category          GFR (mL/min/1.73)    Interpretation  G1                     90 or greater         Normal or high (1)  G2                      60-89                Mild decrease (1)  G3a                   45-59                Mild to moderate decrease  G3b                   30-44                Moderate to severe decrease  G4                    15-29                Severe decrease  G5                    14 or less           Kidney failure          (1)In the absence of evidence of kidney disease, neither GFR category G1 or G2 fulfill the criteria  for CKD.    eGFR calculation 2021 CKD-EPI creatinine equation, which does not include race as a factor    CK [[id number]]  (Normal) Collected: 01/21/25 1707    Specimen: Blood Updated: 01/21/25 1730     Creatine Kinase 85 U/L     Digoxin Level [[id number]]  (Abnormal) Collected: 01/21/25 1707    Specimen: Blood Updated: 01/21/25 1730     Digoxin 0.32 ng/mL     CBC Auto Differential [[id number]]  (Abnormal) Collected: 01/21/25 1707    Specimen: Blood Updated: 01/21/25 1753     WBC 8.26 10*3/mm3      RBC 6.18 10*6/mm3      Hemoglobin 13.0 g/dL      Hematocrit 44.1 %      MCV 71.4 fL      MCH 21.0 pg      MCHC 29.5 g/dL      RDW 25.6 %      RDW-SD 62.3 fl      MPV 9.9 fL      Platelets 221 10*3/mm3      Neutrophil % 72.0 %      Lymphocyte % 18.6 %      Monocyte % 7.3 %      Eosinophil % 0.7 %      Basophil % 1.0 %      Immature Grans % 0.4 %      Neutrophils, Absolute 5.95 10*3/mm3      Lymphocytes, Absolute 1.54 10*3/mm3      Monocytes, Absolute 0.60 10*3/mm3      Eosinophils, Absolute 0.06 10*3/mm3      Basophils, Absolute 0.08 10*3/mm3      Immature Grans, Absolute 0.03 10*3/mm3      nRBC 0.0 /100 WBC     Scan Slide [[id number]] Collected: 01/21/25 1707    Specimen: Blood Updated: 01/21/25 1753     Acanthocytes Slight/1+     Elliptocytes Mod/2+     Helmet Cells Slight/1+     Hypochromia Slight/1+     WBC Morphology Normal     Platelet Estimate Adequate    POC Glucose Once [[id number]]  (Abnormal) Collected: 01/22/25 0019    Specimen: Blood Updated: 01/22/25 0021     Glucose 206 mg/dL      Comment: Serial Number: [id number]:  [id number]       POC Glucose Once [[id number]]  (Abnormal) Collected: 01/22/25 0904    Specimen: Blood Updated: 01/22/25 0906     Glucose 175 mg/dL      Comment: Serial Number: [id number]:  [id number]       POC Glucose 4x Daily Before Meals & at Bedtime [[id number]]  (Abnormal) Collected: 01/22/25 1212    Specimen: Blood Updated: 01/22/25 1214     Glucose 192 mg/dL      Comment: Serial  Number: [id number]:  [id number]       Clostridioides difficile Toxin - Stool, Per Rectum [[id number]] Collected: 01/22/25 1517    Specimen: Stool from Per Rectum Updated: 01/22/25 1521    Narrative:      The following orders were created for panel order Clostridioides difficile Toxin - Stool, Per Rectum.  Procedure                               Abnormality         Status                     ---------                               -----------         ------                     Clostridioides difficile...[[id number]]                      In process                   Please view results for these tests on the individual orders.    Clostridioides difficile Toxin, PCR - Stool, Per Rectum [[id number]] Collected: 01/22/25 1517    Specimen: Stool from Per Rectum Updated: 01/22/25 1521    Clostridioides difficile toxin Ag, Reflex - Stool, Per Rectum [[id number]] Collected: 01/22/25 1517    Specimen: Stool from Per Rectum Updated: 01/22/25 1615             Imaging:    No Radiology Exams Resulted Within Past 24 Hours      Differential Diagnosis and Discussion:    Edema: Based on the patient's history, signs, and symptoms, the differential diagnosis includes but is not limited to heart failure, kidney disease, liver disease, venous insufficiency, lymphedema, pregnancy, medications, allergic reactions.    PROCEDURES:    Labs were collected in the emergency department and all labs were reviewed and interpreted by me.    No orders to display       Procedures    MDM  Patient presents today with 3 days of inability to ambulate and inability to complete ADLs.  Patient presents via EMS.  Workup today was completed secondary to ongoing presentation of bilateral lower extremity edema with chronic wounds.  Patient voices no acute pain in abdomen chest or lower extremities.  Patient's workup today revealed evidence of mild metabolic disturbances including mild electrolyte abnormalities.    A consult was placed with the hospitalist   Andressa secondary to patient's inability to complete ADLs and future need for LTC.  Consult was also placed with FRANCISCO Coronel for future LTC placement.  Hospitalist consult denied for observation as patient has longstanding history of difficulty completing disposition with patient having extensive long-term in-hospital stay secondary to complications and change in request.  Dr. Crisostomo hospitalist recommended observation in the ED tonight with acute follow-up with Delfina Coronel LCSW.  Spoke with Delfina Coronel LCSW who is aware of patient's ED observation status and reports that she will follow-up with LTC placement with location in [place] that previously accepted the patient.      Repeat bedside interview and exam with patient reveals a patient sitting comfortably at bedside watching television and who had recently completed a full meal.  Denies any acute pain with vital signs remaining stable during ED course.  Patient voices understanding and agreement to LTC facility placement and plan at this time.                Patient Care Considerations:    SEPSIS was considered but is NOT present in the emergency department as SIRS criteria is not present.      Consultants/Shared Management Plan:    Multiple parties have been involved in patient's care.  I have consulted with the ED social workers multiple times of hospital placement.  Additionally wound care nursing staff have been consulted as well as inpatient physical therapy  Consultant: I have discussed the case with Carole who states she will admit the pt.     Social Determinants of Health:     was consulted and LTC placement pending      Disposition and Care Coordination:  Patient admitted to the hospital.  Hospital admission and  will continue to work on placement at an assisted living facility.          Final diagnoses:   Impaired mobility and ADLs   Multiple wounds of skin   Obesity, unspecified class, unspecified obesity  type, unspecified whether serious comorbidity present        ED Disposition       ED Disposition   Decision to Admit    Condition   --    Comment   --               This medical record created using voice recognition software.             Leah Carrasco, APRN  01/22/25 5572

## 2025-01-21 NOTE — SIGNIFICANT NOTE
01/21/25 1711   Plan   Final Note DANYEL notified by provider that pt was now interested in going to nursing home in [place]. DANYEL contacted [facility] this date in an attempt to speak with admissions, Vy. Per staff, Vy has left for the day and will not return until 9am on 1/22/25. SW inquired about speaking with anyone regarding admissions and they advised that intake staff are gone for the date.

## 2025-01-21 NOTE — LETTER
[facility]  [address]  [place] [address]  [phone]        January 23, 2025      Patient: Jose Shaikh  YOB: 1960  Date of Visit: 1/21/2025      I have attached updated OT note  Please call me at [phone] for any additional information.       KIMBERLY Flaherty

## 2025-01-22 ENCOUNTER — READMISSION MANAGEMENT (OUTPATIENT)
Dept: CALL CENTER | Facility: HOSPITAL | Age: 65
End: 2025-01-22
Payer: MEDICARE

## 2025-01-22 PROBLEM — Z78.9 UNABLE TO CARE FOR SELF: Status: ACTIVE | Noted: 2025-01-22

## 2025-01-22 LAB
027 TOXIN: ABNORMAL
BACTERIA SPEC AEROBE CULT: NORMAL
C DIFF GDH + TOXINS A+B STL QL IA.RAPID: NEGATIVE
C DIFF TOX GENS STL QL NAA+PROBE: POSITIVE
GLUCOSE BLDC GLUCOMTR-MCNC: 175 MG/DL (ref 70–99)
GLUCOSE BLDC GLUCOMTR-MCNC: 192 MG/DL (ref 70–99)
GLUCOSE BLDC GLUCOMTR-MCNC: 206 MG/DL (ref 70–99)
GLUCOSE BLDC GLUCOMTR-MCNC: 222 MG/DL (ref 70–99)
GLUCOSE BLDC GLUCOMTR-MCNC: 239 MG/DL (ref 70–99)

## 2025-01-22 PROCEDURE — 87493 C DIFF AMPLIFIED PROBE: CPT | Performed by: STUDENT IN AN ORGANIZED HEALTH CARE EDUCATION/TRAINING PROGRAM

## 2025-01-22 PROCEDURE — A9270 NON-COVERED ITEM OR SERVICE: HCPCS | Performed by: PHYSICIAN ASSISTANT

## 2025-01-22 PROCEDURE — 82948 REAGENT STRIP/BLOOD GLUCOSE: CPT

## 2025-01-22 PROCEDURE — 87449 NOS EACH ORGANISM AG IA: CPT | Performed by: STUDENT IN AN ORGANIZED HEALTH CARE EDUCATION/TRAINING PROGRAM

## 2025-01-22 PROCEDURE — A9270 NON-COVERED ITEM OR SERVICE: HCPCS | Performed by: STUDENT IN AN ORGANIZED HEALTH CARE EDUCATION/TRAINING PROGRAM

## 2025-01-22 PROCEDURE — 63710000001 BACLOFEN 10 MG TABLET: Performed by: STUDENT IN AN ORGANIZED HEALTH CARE EDUCATION/TRAINING PROGRAM

## 2025-01-22 PROCEDURE — 97161 PT EVAL LOW COMPLEX 20 MIN: CPT

## 2025-01-22 PROCEDURE — 63710000001 INSULIN LISPRO (HUMAN) PER 5 UNITS: Performed by: STUDENT IN AN ORGANIZED HEALTH CARE EDUCATION/TRAINING PROGRAM

## 2025-01-22 PROCEDURE — 99213 OFFICE O/P EST LOW 20 MIN: CPT | Performed by: STUDENT IN AN ORGANIZED HEALTH CARE EDUCATION/TRAINING PROGRAM

## 2025-01-22 PROCEDURE — 63710000001 SERTRALINE 50 MG TABLET: Performed by: STUDENT IN AN ORGANIZED HEALTH CARE EDUCATION/TRAINING PROGRAM

## 2025-01-22 PROCEDURE — 63710000001 APIXABAN 5 MG TABLET: Performed by: STUDENT IN AN ORGANIZED HEALTH CARE EDUCATION/TRAINING PROGRAM

## 2025-01-22 PROCEDURE — 63710000001 INSULIN GLARGINE PER 5 UNITS: Performed by: STUDENT IN AN ORGANIZED HEALTH CARE EDUCATION/TRAINING PROGRAM

## 2025-01-22 PROCEDURE — 63710000001 ACETAMINOPHEN EXTRA STRENGTH 500 MG TABLET: Performed by: PHYSICIAN ASSISTANT

## 2025-01-22 RX ORDER — AMOXICILLIN 250 MG
2 CAPSULE ORAL 2 TIMES DAILY PRN
Status: DISCONTINUED | OUTPATIENT
Start: 2025-01-22 | End: 2025-01-24

## 2025-01-22 RX ORDER — FERROUS SULFATE 325(65) MG
325 TABLET ORAL
Status: DISCONTINUED | OUTPATIENT
Start: 2025-01-23 | End: 2025-01-30 | Stop reason: HOSPADM

## 2025-01-22 RX ORDER — BUPROPION HYDROCHLORIDE 150 MG/1
150 TABLET ORAL DAILY
Status: DISCONTINUED | OUTPATIENT
Start: 2025-01-23 | End: 2025-01-30 | Stop reason: HOSPADM

## 2025-01-22 RX ORDER — BISACODYL 5 MG/1
5 TABLET, DELAYED RELEASE ORAL DAILY PRN
Status: DISCONTINUED | OUTPATIENT
Start: 2025-01-22 | End: 2025-01-24

## 2025-01-22 RX ORDER — OXYCODONE AND ACETAMINOPHEN 5; 325 MG/1; MG/1
1 TABLET ORAL EVERY 8 HOURS PRN
COMMUNITY
End: 2025-01-30 | Stop reason: HOSPADM

## 2025-01-22 RX ORDER — SODIUM CHLORIDE 0.9 % (FLUSH) 0.9 %
10 SYRINGE (ML) INJECTION EVERY 12 HOURS SCHEDULED
Status: DISCONTINUED | OUTPATIENT
Start: 2025-01-22 | End: 2025-01-30 | Stop reason: HOSPADM

## 2025-01-22 RX ORDER — DEXTROSE MONOHYDRATE 25 G/50ML
25 INJECTION, SOLUTION INTRAVENOUS
Status: DISCONTINUED | OUTPATIENT
Start: 2025-01-22 | End: 2025-01-30 | Stop reason: HOSPADM

## 2025-01-22 RX ORDER — DIGOXIN 125 MCG
125 TABLET ORAL
Status: DISCONTINUED | OUTPATIENT
Start: 2025-01-23 | End: 2025-01-30 | Stop reason: HOSPADM

## 2025-01-22 RX ORDER — ATORVASTATIN CALCIUM 10 MG/1
10 TABLET, FILM COATED ORAL DAILY
Status: DISCONTINUED | OUTPATIENT
Start: 2025-01-23 | End: 2025-01-23

## 2025-01-22 RX ORDER — BACLOFEN 10 MG/1
10 TABLET ORAL 3 TIMES DAILY PRN
Status: DISCONTINUED | OUTPATIENT
Start: 2025-01-22 | End: 2025-01-30 | Stop reason: HOSPADM

## 2025-01-22 RX ORDER — SODIUM CHLORIDE 9 MG/ML
40 INJECTION, SOLUTION INTRAVENOUS AS NEEDED
Status: DISCONTINUED | OUTPATIENT
Start: 2025-01-22 | End: 2025-01-30 | Stop reason: HOSPADM

## 2025-01-22 RX ORDER — BACLOFEN 10 MG/1
10 TABLET ORAL 3 TIMES DAILY
Status: DISCONTINUED | OUTPATIENT
Start: 2025-01-22 | End: 2025-01-22

## 2025-01-22 RX ORDER — BISACODYL 10 MG
10 SUPPOSITORY, RECTAL RECTAL DAILY PRN
Status: DISCONTINUED | OUTPATIENT
Start: 2025-01-22 | End: 2025-01-24

## 2025-01-22 RX ORDER — ACETAMINOPHEN 500 MG
1000 TABLET ORAL EVERY 6 HOURS PRN
Status: DISCONTINUED | OUTPATIENT
Start: 2025-01-22 | End: 2025-01-23

## 2025-01-22 RX ORDER — METOPROLOL SUCCINATE 25 MG/1
25 TABLET, EXTENDED RELEASE ORAL
Status: DISCONTINUED | OUTPATIENT
Start: 2025-01-23 | End: 2025-01-30 | Stop reason: HOSPADM

## 2025-01-22 RX ORDER — NICOTINE POLACRILEX 4 MG
15 LOZENGE BUCCAL
Status: DISCONTINUED | OUTPATIENT
Start: 2025-01-22 | End: 2025-01-30 | Stop reason: HOSPADM

## 2025-01-22 RX ORDER — BUMETANIDE 1 MG/1
1 TABLET ORAL DAILY
Status: DISCONTINUED | OUTPATIENT
Start: 2025-01-23 | End: 2025-01-29

## 2025-01-22 RX ORDER — SODIUM CHLORIDE 0.9 % (FLUSH) 0.9 %
10 SYRINGE (ML) INJECTION AS NEEDED
Status: DISCONTINUED | OUTPATIENT
Start: 2025-01-22 | End: 2025-01-30 | Stop reason: HOSPADM

## 2025-01-22 RX ORDER — POLYETHYLENE GLYCOL 3350 17 G/17G
17 POWDER, FOR SOLUTION ORAL DAILY PRN
Status: DISCONTINUED | OUTPATIENT
Start: 2025-01-22 | End: 2025-01-24

## 2025-01-22 RX ORDER — INSULIN LISPRO 100 [IU]/ML
2-9 INJECTION, SOLUTION INTRAVENOUS; SUBCUTANEOUS
Status: DISCONTINUED | OUTPATIENT
Start: 2025-01-22 | End: 2025-01-30 | Stop reason: HOSPADM

## 2025-01-22 RX ADMIN — BUPROPION HYDROCHLORIDE 150 MG: 150 TABLET, FILM COATED, EXTENDED RELEASE ORAL at 10:10

## 2025-01-22 RX ADMIN — INSULIN GLARGINE 10 UNITS: 100 INJECTION, SOLUTION SUBCUTANEOUS at 18:04

## 2025-01-22 RX ADMIN — ACETAMINOPHEN 1000 MG: 500 TABLET ORAL at 21:02

## 2025-01-22 RX ADMIN — Medication 10 ML: at 21:02

## 2025-01-22 RX ADMIN — APIXABAN 5 MG: 5 TABLET, FILM COATED ORAL at 21:02

## 2025-01-22 RX ADMIN — INSULIN LISPRO 4 UNITS: 100 INJECTION, SOLUTION INTRAVENOUS; SUBCUTANEOUS at 18:05

## 2025-01-22 RX ADMIN — BACLOFEN 10 MG: 10 TABLET ORAL at 18:05

## 2025-01-22 RX ADMIN — INSULIN LISPRO 4 UNITS: 100 INJECTION, SOLUTION INTRAVENOUS; SUBCUTANEOUS at 21:02

## 2025-01-22 RX ADMIN — DIGOXIN 125 MCG: 125 TABLET ORAL at 00:15

## 2025-01-22 RX ADMIN — POTASSIUM CHLORIDE 40 MEQ: 750 CAPSULE, EXTENDED RELEASE ORAL at 00:15

## 2025-01-22 RX ADMIN — Medication 10 ML: at 18:05

## 2025-01-22 RX ADMIN — Medication 400 MG: at 10:10

## 2025-01-22 RX ADMIN — SERTRALINE HYDROCHLORIDE 50 MG: 50 TABLET ORAL at 21:02

## 2025-01-22 RX ADMIN — BUMETANIDE 1 MG: 1 TABLET ORAL at 00:15

## 2025-01-22 RX ADMIN — BUPROPION HYDROCHLORIDE 150 MG: 150 TABLET, FILM COATED, EXTENDED RELEASE ORAL at 00:15

## 2025-01-22 RX ADMIN — APIXABAN 5 MG: 5 TABLET, FILM COATED ORAL at 00:16

## 2025-01-22 RX ADMIN — METOPROLOL SUCCINATE 25 MG: 25 TABLET, FILM COATED, EXTENDED RELEASE ORAL at 00:16

## 2025-01-22 NOTE — PLAN OF CARE
Goal Outcome Evaluation:  Plan of Care Reviewed With: patient           Outcome Evaluation: Patient is not appropriate for skilled physical therapy services as he has been dependent with ADLs and bedbound for several years.  Recommend LTC placement as pt cannot care for himself and states he can not afford a hired caregiver for his basic ADLs.  Should OOB activity be necessary this admission recommend using a bariatric Cyndy lift for his and the staffs safety.  PT will sign off at this time.    Anticipated Discharge Disposition (PT): extended care facility

## 2025-01-22 NOTE — PLAN OF CARE
Goal Outcome Evaluation:  Plan of Care Reviewed With: patient        Progress: no change  Outcome Evaluation: Pt is alert and oriented. Transfered to 3NT from ED. Wound care completed per wound care nurse. Continue plan of care.

## 2025-01-22 NOTE — H&P
[facility]  Date: 2025   Patient Name: Jose Shaikh  : 1960  MRN: [id number]  Primary Care Physician:  Provider, No Known  Date of admission: 2025    Subjective   Subjective     Chief Complaint: unable to take care of self    HPI:    Jose Shaikh is a 64 y.o. male With history of chronic diastolic congestive heart failure, paroxysmal A-fib on Eliquis, type 2 diabetes, morbid obesity, debility with wheelchair dependence, osteoarthritis of hip, chronic venous stasis, deconditioning, hemorrhoids and anasarca who presented to ER after not being able to take care of himself at home.  Patient reported that he had been laying in his own feces/urine for last several days.  He lives alone at home and came to the ER for cleanup.  His potassium was low when he presented to ER which was repleted.  Vital stable.  ER  was involved as patient is not interested in going to nursing home in [place].   contacted [facility] nursing and rehab; patient agreeable to start the referral.  Admitted for pending discharge to nursing home.  No other active issues.  Patient was laying in bed during my evaluation and was getting clean.  No active issues.  Wound care on board.    Personal History     Past Medical History:  Past Medical History:   Diagnosis Date    Absence of toe of right foot     Acute osteomyelitis of left calcaneus  2021    Anxiety and depression     Arthritis     Cancer     Chronic pain     STATES HIS PAIN IS 10/10 AAT    Claustrophobia     Corns and callus     Diabetic ulcer of left heel associated with type 2 DM 2021    Diabetic ulcer of left heel associated with type 2 DM 2021    Diabetic ulcer of right midfoot associated with type 2 DM 2021    Difficulty walking     Essential hypertension 2021    Hammertoe     Hyperlipidemia LDL goal <100 2021    Ingrown toenail     Obesity     Paroxysmal atrial  fibrillation 2021    Polyneuropathy     Pressure ulcer, stage 1     Tinea unguium     Type 2 diabetes mellitus with polyneuropathy        Past Surgical History:  Past Surgical History:   Procedure Laterality Date    CYST REMOVAL      center of back; benign    EYE SURGERY      INCISION AND DRAINAGE ABSCESS      back    INCISION AND DRAINAGE LEG Right 12/10/2021    Procedure: INCISION AND DRAINAGE LOWER EXTREMITY;  Surgeon: Ash Leyva DPM;  Location: [facility] MAIN OR;  Service: Podiatry;  Laterality: Right;    OTHER SURGICAL HISTORY      Surgical clips left foot    TOE SURGERY Right     Removal of 5th toe    TRANS METATARSAL AMPUTATION Right 2021    Procedure: AMPUTATION TRANS METATARSAL;  Surgeon: Ash Leyva DPM;  Location: [facility] MAIN OR;  Service: Podiatry;  Laterality: Right;    VASCULAR SURGERY      WRIST SURGERY Left     repair of injury       Family History:       Social History:   Social History     Socioeconomic History    Marital status:    Tobacco Use    Smoking status: Former     Current packs/day: 0.00     Types: Cigarettes     Quit date: 1990     Years since quittin.4    Smokeless tobacco: Never    Tobacco comments:     quit at age 30   Vaping Use    Vaping status: Never Used   Substance and Sexual Activity    Alcohol use: Not Currently     Comment: rare    Drug use: Not Currently     Types: Marijuana     Comment: occasionally, EVERY 3-4 MONTH    Sexual activity: Defer       Home Medications:  BASAGLAR KWIKPEN, Lidocaine, acetaminophen, apixaban, buPROPion XL, bumetanide, digoxin, ferrous sulfate, insulin aspart, magnesium oxide, metoprolol succinate XL, potassium chloride, sennosides-docusate, sertraline, and simvastatin    Allergies:  Allergies   Allergen Reactions    Adhesive Tape Rash       Review of Systems   All systems were reviewed and negative except for: Unable to take care of himself    Objective   Objective     Vitals:   Temp:  [97.6 °F  (36.4 °C)-98 °F (36.7 °C)] 97.7 °F (36.5 °C)  Heart Rate:  [] 57  Resp:  [16-22] 22  BP: (100-124)/(55-97) 119/73    Physical Exam    Constitutional: Awake, alert, no acute distress   Respiratory: Clear to auscultation bilaterally, nonlabored respirations    Cardiovascular: RRR, no murmurs, rubs, or gallops, palpable pedal pulses bilaterally   Gastrointestinal: Positive bowel sounds, soft, nontender, nondistended   Psychiatric: Appropriate affect, cooperative   Neurologic: Oriented x 3, speech clear      Result Review    Result Review:  I have personally reviewed the results from the time of this admission to 1/22/2025 16:52 EST and agree with these findings:  []  Laboratory  []  Microbiology  []  Radiology  []  EKG/Telemetry   []  Cardiology/Vascular   []  Pathology  []  Old records  []  Other:      Assessment & Plan   Assessment / Plan     Assessment/Plan:   Unable to take care of himself at home  History of chronic diastolic congestive heart failure  paroxysmal A-fib on Eliquis  Type 2 diabetes  Morbid obesity  Debility with wheelchair dependence  Osteoarthritis of hip  Chronic venous stasis  Deconditioning  Hemorrhoids   Anasarca    -Patient is being admitted to outpatient bed.  -Presented after not being able to take care of himself and had been laying in feces/urine for past 3 days.  -Patient was cleaned and  working on his disposition.  -Referral sent to [facility] nursing and rehab at [place].  -Pending disposition.  -Home medication reconciled and restarted.  -Patient has a history of chronic loose stool, he had multiple episodes since he was admitted for which nursing staff send to C. difficile.  Has history of C. difficile colonization.  -No leukocytosis and renal function stable.  Less likely C. difficile infection.  -C. difficile PCR along with toxin pending.  -On as needed baclofen for muscle spasm.  -Continue rest of current management.  -Lab work in AM.  -Patient to be  discharged home if he refuses rehab placement.    VTE Prophylaxis:  Pharmacologic VTE prophylaxis orders are present.        CODE STATUS:    Code Status (Patient has no pulse and is not breathing): CPR (Attempt to Resuscitate)  Medical Interventions (Patient has pulse or is breathing): Full Support      Admission Status:  I believe this patient meets outpatient bed status.    Electronically signed by Janis Lam MD, 01/22/25, 1:40 PM EST.

## 2025-01-22 NOTE — THERAPY EVALUATION
Acute Care - Physical Therapy Initial Evaluation  [facility]     Patient Name: Jose Shaikh  : 1960  MRN: [id number]  Today's Date: 2025     Admit date: 2025     Referring Physician: Janis Lam MD     Surgery Date:* No surgery found *             Visit Dx:     ICD-10-CM ICD-9-CM   1. Impaired mobility and ADLs  Z74.09 V49.89    Z78.9    2. Difficulty in walking  R26.2 719.7     Patient Active Problem List   Diagnosis    Diabetic ulcer of left heel associated with type 2 DM    Acute osteomyelitis of left calcaneus     Diabetic ulcer of left heel associated with type 2 DM    Diabetic ulcer of right midfoot associated with type 2 DM    Paroxysmal atrial fibrillation    Essential hypertension    Hyperlipidemia LDL goal <100    Cellulitis and abscess of foot    High alkaline phosphatase level    Osteomyelitis    Onychomycosis    Onychocryptosis    Foot pain, bilateral    Osteomyelitis of foot, right, acute    Cellulitis of right foot    Type 2 diabetes mellitus, with long-term current use of insulin    Class 3 severe obesity due to excess calories with serious comorbidity and body mass index (BMI) of 45.0 to 49.9 in adult    Anxiety disorder, unspecified    Claustrophobia    Dependence on wheelchair    Depression, unspecified    Long term (current) use of anticoagulants    Long term (current) use of oral hypoglycemic drugs    Wound of foot    Ulcer of right foot    Orthostatic hypotension    Other chronic osteomyelitis, right ankle and foot    Personal history of nicotine dependence    Thrombocytopenia, unspecified    Unspecified open wound, right foot, initial encounter    Diabetic foot infection    Subacute osteomyelitis of right foot    Right foot pain    Sepsis    Onychomycosis    Foot pain, left    COVID-19 virus detected    Absence of toe of right foot    Corns and callosity    Disability of walking    Fracture    Limb swelling    Polyneuropathy    Pressure ulcer, stage 1    Shortness of  breath    Generalized weakness    Debility    Onychomycosis    Noncompliance of patient with dietary regimen    Morbid obesity    COVID-19 virus infection    COVID-19    CHF exacerbation    Unable to care for self     Past Medical History:   Diagnosis Date    Absence of toe of right foot     Acute osteomyelitis of left calcaneus  08/18/2021    Anxiety and depression     Arthritis     Cancer     Chronic pain     STATES HIS PAIN IS 10/10 AAT    Claustrophobia     Corns and callus     Diabetic ulcer of left heel associated with type 2 DM 08/18/2021    Diabetic ulcer of left heel associated with type 2 DM 07/06/2021    Diabetic ulcer of right midfoot associated with type 2 DM 08/18/2021    Difficulty walking     Essential hypertension 08/31/2021    Hammertoe     Hyperlipidemia LDL goal <100 08/31/2021    Ingrown toenail     Obesity     Paroxysmal atrial fibrillation 08/31/2021    Polyneuropathy     Pressure ulcer, stage 1     Tinea unguium     Type 2 diabetes mellitus with polyneuropathy      Past Surgical History:   Procedure Laterality Date    CYST REMOVAL      center of back; benign    EYE SURGERY      INCISION AND DRAINAGE ABSCESS      back    INCISION AND DRAINAGE LEG Right 12/10/2021    Procedure: INCISION AND DRAINAGE LOWER EXTREMITY;  Surgeon: Ash Leyva DPM;  Location: [facility];  Service: Podiatry;  Laterality: Right;    OTHER SURGICAL HISTORY      Surgical clips left foot    TOE SURGERY Right     Removal of 5th toe    TRANS METATARSAL AMPUTATION Right 12/02/2021    Procedure: AMPUTATION TRANS METATARSAL;  Surgeon: Ash Leyva DPM;  Location: [facility];  Service: Podiatry;  Laterality: Right;    VASCULAR SURGERY      WRIST SURGERY Left     repair of injury     PT Assessment (Last 12 Hours)       PT Evaluation and Treatment       Row Name 01/22/25 1300          Physical Therapy Time and Intention    Subjective Information no complaints  -MOE     Document Type evaluation  -MOE      Mode of Treatment individual therapy;physical therapy  -MOE     Patient Effort good  -MOE       Row Name 01/22/25 1300          General Information    Patient Observations alert;cooperative;agree to therapy  -MOE     Prior Level of Function dependent:;all household mobility  Pt is bedbound at baseline.  He only transfers OOB with assistance of EMS.  -MOE     Equipment Currently Used at Home hospital bed  Pt has had a paid caregiver but reports she has not been with him for the last 2 weeks.  -MOE     Existing Precautions/Restrictions fall  -MOE     Barriers to Rehab previous functional deficit  -MOE       Row Name 01/22/25 1300          Living Environment    Current Living Arrangements home  -MOE     People in Home alone  -MOE       Row Name 01/22/25 1300          Range of Motion (ROM)    Range of Motion bilateral lower extremities  BLE painful to all ROM with significant hip and knee contractures. Pt with bilateral knee ROM limited from about 10-30° and hip external rotation contractures also noted bilaterally.  -MOE       Row Name 01/22/25 1300          Strength (Manual Muscle Testing)    Strength (Manual Muscle Testing) bilateral lower extremities  RLE 3/5; LLE 2-/5  -MOE       Row Name 01/22/25 1300          Bed Mobility    Bed Mobility bed mobility (all) activities;supine-sit  -MOE     All Activities, [place] (Bed Mobility) dependent (less than 25% patient effort)  -MOE     Supine-Sit [place] (Bed Mobility) dependent (less than 25% patient effort)  -MOE       Row Name 01/22/25 1300          Transfers    Comment, (Transfers) Pt is dependent and bedbound at baseline.  He is not safe for OOB transfers.  -MOE       Row Name 01/22/25 1300          Safety Issues/Impairments Affecting Functional Mobility    Impairments Affecting Function (Mobility) balance;pain;range of motion (ROM);strength  -MOE       Row Name 01/22/25 1300          Plan of Care Review    Plan of Care Reviewed With patient  -MOE     Outcome Evaluation  Patient is not appropriate for skilled physical therapy services as he has been dependent with ADLs and bedbound for several years.  Recommend LTC placement as pt cannot care for himself and states he can not afford a hired caregiver for his basic ADLs.  Should OOB activity be necessary this admission recommend using a bariatric Cyndy lift for his and the staffs safety.  PT will sign off at this time.  -MOE       Row Name 01/22/25 1300          Therapy Assessment/Plan (PT)    Criteria for Skilled Interventions Met (PT) does not meet criteria for skilled intervention  -MOE     Therapy Frequency (PT) evaluation only  -MOE       Row Name 01/22/25 1300          PT Evaluation Complexity    History, PT Evaluation Complexity no personal factors and/or comorbidities  -MOE     Examination of Body Systems (PT Eval Complexity) total of 4 or more elements  -MOE     Clinical Presentation (PT Evaluation Complexity) stable  -MOE     Clinical Decision Making (PT Evaluation Complexity) low complexity  -MOE     Overall Complexity (PT Evaluation Complexity) low complexity  -MOE               User Key  (r) = Recorded By, (t) = Taken By, (c) = Cosigned By      Initials Name Provider Type    Merlin De La O, PT Physical Therapist                    Physical Therapy Education        No education to display                  PT Recommendation and Plan  Anticipated Discharge Disposition (PT): extended care facility  Therapy Frequency (PT): evaluation only  Plan of Care Reviewed With: patient  Outcome Evaluation: Patient is not appropriate for skilled physical therapy services as he has been dependent with ADLs and bedbound for several years.  Recommend LTC placement as pt cannot care for himself and states he can not afford a hired caregiver for his basic ADLs.  Should OOB activity be necessary this admission recommend using a bariatric Cyndy lift for his and the staffs safety.  PT will sign off at this time.   Outcome Measures       Row Name  01/22/25 1300             How much help from another person do you currently need...    Turning from your back to your side while in flat bed without using bedrails? 1  -OME      Moving from lying on back to sitting on the side of a flat bed without bedrails? 1  -MOE      Moving to and from a bed to a chair (including a wheelchair)? 1  -MOE      Standing up from a chair using your arms (e.g., wheelchair, bedside chair)? 1  -MOE      Climbing 3-5 steps with a railing? 1  -MOE      To walk in hospital room? 1  -MOE      AM-PAC 6 Clicks Score (PT) 6  -MOE         Functional Assessment    Outcome Measure Options AM-PAC 6 Clicks Basic Mobility (PT)  -MOE                User Key  (r) = Recorded By, (t) = Taken By, (c) = Cosigned By      Initials Name Provider Type    Merlin De La O, PT Physical Therapist                     Time Calculation:    PT Charges       Row Name 01/22/25 1340             Time Calculation    PT Received On 01/22/25  -MOE         Untimed Charges    PT Eval/Re-eval Minutes 15  -MOE         Total Minutes    Untimed Charges Total Minutes 15  -MOE       Total Minutes 15  -MOE                User Key  (r) = Recorded By, (t) = Taken By, (c) = Cosigned By      Initials Name Provider Type    Merlin De La O, PT Physical Therapist                      PT G-Codes  Outcome Measure Options: AM-PAC 6 Clicks Basic Mobility (PT)  AM-PAC 6 Clicks Score (PT): 6    Merlin Rao, PT  1/22/2025

## 2025-01-22 NOTE — OUTREACH NOTE
Medical Week 1 Survey      Flowsheet Row Responses   [facility] facility patient discharged from? [facility]   Does the patient have one of the following disease processes/diagnoses(primary or secondary)? CHF   Revoke Readmitted            PRIETO ARIAS - Registered Nurse

## 2025-01-22 NOTE — SIGNIFICANT NOTE
Wound Eval / Progress Noted    [facility]     Patient Name: Jose Shaikh  : 1960  MRN: [id number]  Today's Date: 2025                 Admit Date: 2025    Visit Dx:    ICD-10-CM ICD-9-CM   1. Impaired mobility and ADLs  Z74.09 V49.89    Z78.9    2. Difficulty in walking  R26.2 719.7         Unable to care for self        Past Medical History:   Diagnosis Date    Absence of toe of right foot     Acute osteomyelitis of left calcaneus  2021    Anxiety and depression     Arthritis     Cancer     Chronic pain     STATES HIS PAIN IS 10/10 AAT    Claustrophobia     Corns and callus     Diabetic ulcer of left heel associated with type 2 DM 2021    Diabetic ulcer of left heel associated with type 2 DM 2021    Diabetic ulcer of right midfoot associated with type 2 DM 2021    Difficulty walking     Essential hypertension 2021    Hammertoe     Hyperlipidemia LDL goal <100 2021    Ingrown toenail     Obesity     Paroxysmal atrial fibrillation 2021    Polyneuropathy     Pressure ulcer, stage 1     Tinea unguium     Type 2 diabetes mellitus with polyneuropathy         Past Surgical History:   Procedure Laterality Date    CYST REMOVAL      center of back; benign    EYE SURGERY      INCISION AND DRAINAGE ABSCESS      back    INCISION AND DRAINAGE LEG Right 12/10/2021    Procedure: INCISION AND DRAINAGE LOWER EXTREMITY;  Surgeon: Ash Leyva DPM;  Location: [facility];  Service: Podiatry;  Laterality: Right;    OTHER SURGICAL HISTORY      Surgical clips left foot    TOE SURGERY Right     Removal of 5th toe    TRANS METATARSAL AMPUTATION Right 2021    Procedure: AMPUTATION TRANS METATARSAL;  Surgeon: Ash Leyva DPM;  Location: [facility] OR;  Service: Podiatry;  Laterality: Right;    VASCULAR SURGERY      WRIST SURGERY Left     repair of injury       Wound Check / Follow-up:  wound nurse consult from ER staff , patient now in 3021 on 3NT.  Alert and agreeable to skin assessment. Patient lives at home alone, reports poor access to food and hygiene needs. Has had 3 loose brown liquid stools since arriving to floor and all during wound evaluation.     Incontinence care provided. PCA to give full bath with soap and water once patient is settled. Provider rounded during wound eval as well.       Left dorsal toes -- redness noted, non blanchable appear trauma unable to rule out pressure   Suggest: hygiene and blue top moisture barrier , daily       Scrotum -- redness present, scarring noted.   Suggesting: hygiene and blue top moisture barrier , TID and prn incontinence         Left upper back, old scar noted patient does confirm prior surgery to this site years ago, scant serosanguineous drainage, 0.4cmlx0.3cmWx0.2cmD red moist bloody base.. no odor.   Suggesting: NS, silver hydrofiber, silicone border , daily       Right lower back - blanchable redness present - blanchable at this time.   Suggesting: hygiene, repositioning, blue top moisture barrier BID       Buttocks : outer buttocks area does have scattered serous fluid filled areas. Cleft and perianal WDL at this time, but patient is having frequent loose stools - primary RN has collected specimen to rule out c-diff.   Suggesting: blue top barrier, repositioning. May need to advance to orange top barrier if areas open but continue blue top to intact skin around them should opening occur. -- TID and prn incontinence       Right foot/stump site : red moist open area with bone palpable under thin layer of tissue. Bleeds with any care provided. Slight undermining at edge 0.2cm circumferentially   1.4cmLx1.2cmWx0.4cmD  . Very thick dry scaly skin present to plantar aspect and ankle   Heel red but appears intact soft and blanchable at this time   Suggesting: NS, silver hydrofiber, silicone border for now -- after full bath done, and moisturizer applied   would suggest silver hydrofiber.   Followed by:   kerlix, and light ace to BLE. Daily           Dressing in place on arrival dated 1/16 :  scattered pink healed area and scattered scabs continued to RLE, chronic discoloration noted.     Suggesting: hygiene, moisturizer, kerlix, light ace daily           Right knee : trauma area : dressing dated 1/16 was adhered , soaked with NS and allowed to soak for over 10mins. Removed while soaking with additional NS, partial crusting present, rest is pink moist and open.  2.3cmLx4.5cmWx0.3cmD   Suggesting: NS, silver hydrofiber, silicone border daily           LUE : scattered scabs and crusted trauma areas noted, palmar hand and fingers also noted with scabs.   Suggesting: hygiene and moisturizer to improve elasticity - BID -- if area open - skin tear protocol suggested with oil emulsion(Adaptic) and silicone border every other day or wrap with kerlix and change daily           RUE:  Right hand and lower arm with scattered crusted areas/scabs, different stages of healing. Bruising also present.   Suggesting: hygiene and moisturizer to improve elasticity - BID -- if area open - skin tear protocol suggested with oil emulsion(Adaptic) and silicone border every other day or wrap with kerlix and change daily     Linear redness noted along right upper thigh but skin intact   Groin folds red with dryness noted skin intact   Suggesting : blue top moisture barrier to all areas TID and prn incontinence     Impression: scattered trauma/scabs to BUE and BLE. Buttocks with serous blistering to outer buttocks area, upper back with open area along old incisional scar, open wound with palpable bone to remaining right foot/stump site     Short term goals:  hygiene, nutrition, repositioning, skin care/moisturizer, wound care toward healing. Emotional support.     Ange Jimenes RN    1/22/2025    16:04 EST         16:34 -- ER MSW updated that assessment / notes were completed

## 2025-01-23 PROBLEM — E43 SEVERE PROTEIN-CALORIE MALNUTRITION: Status: ACTIVE | Noted: 2025-01-23

## 2025-01-23 LAB
ALBUMIN SERPL-MCNC: 2.6 G/DL (ref 3.5–5.2)
ALP SERPL-CCNC: 215 U/L (ref 39–117)
ALT SERPL W P-5'-P-CCNC: 19 U/L (ref 1–41)
ANION GAP SERPL CALCULATED.3IONS-SCNC: 8.6 MMOL/L (ref 5–15)
AST SERPL-CCNC: 43 U/L (ref 1–40)
BASOPHILS # BLD AUTO: 0.06 10*3/MM3 (ref 0–0.2)
BASOPHILS NFR BLD AUTO: 1.1 % (ref 0–1.5)
BILIRUB CONJ SERPL-MCNC: 0.4 MG/DL (ref 0–0.3)
BILIRUB INDIRECT SERPL-MCNC: 0.6 MG/DL
BILIRUB SERPL-MCNC: 1 MG/DL (ref 0–1.2)
BUN SERPL-MCNC: 11 MG/DL (ref 8–23)
BUN/CREAT SERPL: 22 (ref 7–25)
CALCIUM SPEC-SCNC: 8 MG/DL (ref 8.6–10.5)
CHLORIDE SERPL-SCNC: 96 MMOL/L (ref 98–107)
CO2 SERPL-SCNC: 24.4 MMOL/L (ref 22–29)
CREAT SERPL-MCNC: 0.5 MG/DL (ref 0.76–1.27)
DEPRECATED RDW RBC AUTO: 61.1 FL (ref 37–54)
EGFRCR SERPLBLD CKD-EPI 2021: 112.5 ML/MIN/1.73
EOSINOPHIL # BLD AUTO: 0.08 10*3/MM3 (ref 0–0.4)
EOSINOPHIL NFR BLD AUTO: 1.5 % (ref 0.3–6.2)
ERYTHROCYTE [DISTWIDTH] IN BLOOD BY AUTOMATED COUNT: 25.3 % (ref 12.3–15.4)
GLUCOSE BLDC GLUCOMTR-MCNC: 168 MG/DL (ref 70–99)
GLUCOSE BLDC GLUCOMTR-MCNC: 219 MG/DL (ref 70–99)
GLUCOSE BLDC GLUCOMTR-MCNC: 248 MG/DL (ref 70–99)
GLUCOSE BLDC GLUCOMTR-MCNC: 263 MG/DL (ref 70–99)
GLUCOSE SERPL-MCNC: 212 MG/DL (ref 65–99)
HCT VFR BLD AUTO: 37.2 % (ref 37.5–51)
HGB BLD-MCNC: 11.3 G/DL (ref 13–17.7)
IMM GRANULOCYTES # BLD AUTO: 0.01 10*3/MM3 (ref 0–0.05)
IMM GRANULOCYTES NFR BLD AUTO: 0.2 % (ref 0–0.5)
LYMPHOCYTES # BLD AUTO: 1.28 10*3/MM3 (ref 0.7–3.1)
LYMPHOCYTES NFR BLD AUTO: 23.5 % (ref 19.6–45.3)
MAGNESIUM SERPL-MCNC: 1.6 MG/DL (ref 1.6–2.4)
MCH RBC QN AUTO: 21.2 PG (ref 26.6–33)
MCHC RBC AUTO-ENTMCNC: 30.4 G/DL (ref 31.5–35.7)
MCV RBC AUTO: 69.7 FL (ref 79–97)
MONOCYTES # BLD AUTO: 0.69 10*3/MM3 (ref 0.1–0.9)
MONOCYTES NFR BLD AUTO: 12.7 % (ref 5–12)
NEUTROPHILS NFR BLD AUTO: 3.33 10*3/MM3 (ref 1.7–7)
NEUTROPHILS NFR BLD AUTO: 61 % (ref 42.7–76)
NRBC BLD AUTO-RTO: 0 /100 WBC (ref 0–0.2)
PHOSPHATE SERPL-MCNC: 2.6 MG/DL (ref 2.5–4.5)
PLATELET # BLD AUTO: 157 10*3/MM3 (ref 140–450)
PMV BLD AUTO: ABNORMAL FL
POTASSIUM SERPL-SCNC: 3.4 MMOL/L (ref 3.5–5.2)
PROT SERPL-MCNC: 5.7 G/DL (ref 6–8.5)
RBC # BLD AUTO: 5.34 10*6/MM3 (ref 4.14–5.8)
SODIUM SERPL-SCNC: 129 MMOL/L (ref 136–145)
WBC NRBC COR # BLD AUTO: 5.45 10*3/MM3 (ref 3.4–10.8)

## 2025-01-23 PROCEDURE — A9270 NON-COVERED ITEM OR SERVICE: HCPCS | Performed by: STUDENT IN AN ORGANIZED HEALTH CARE EDUCATION/TRAINING PROGRAM

## 2025-01-23 PROCEDURE — 63710000001 POTASSIUM CHLORIDE 10 MEQ CAPSULE CONTROLLED-RELEASE: Performed by: STUDENT IN AN ORGANIZED HEALTH CARE EDUCATION/TRAINING PROGRAM

## 2025-01-23 PROCEDURE — 63710000001 ATORVASTATIN 10 MG TABLET: Performed by: STUDENT IN AN ORGANIZED HEALTH CARE EDUCATION/TRAINING PROGRAM

## 2025-01-23 PROCEDURE — 97165 OT EVAL LOW COMPLEX 30 MIN: CPT

## 2025-01-23 PROCEDURE — 83735 ASSAY OF MAGNESIUM: CPT | Performed by: STUDENT IN AN ORGANIZED HEALTH CARE EDUCATION/TRAINING PROGRAM

## 2025-01-23 PROCEDURE — A9270 NON-COVERED ITEM OR SERVICE: HCPCS

## 2025-01-23 PROCEDURE — 99213 OFFICE O/P EST LOW 20 MIN: CPT | Performed by: STUDENT IN AN ORGANIZED HEALTH CARE EDUCATION/TRAINING PROGRAM

## 2025-01-23 PROCEDURE — 80048 BASIC METABOLIC PNL TOTAL CA: CPT | Performed by: STUDENT IN AN ORGANIZED HEALTH CARE EDUCATION/TRAINING PROGRAM

## 2025-01-23 PROCEDURE — 63710000001 BACLOFEN 10 MG TABLET: Performed by: STUDENT IN AN ORGANIZED HEALTH CARE EDUCATION/TRAINING PROGRAM

## 2025-01-23 PROCEDURE — A9270 NON-COVERED ITEM OR SERVICE: HCPCS | Performed by: PHYSICIAN ASSISTANT

## 2025-01-23 PROCEDURE — 82948 REAGENT STRIP/BLOOD GLUCOSE: CPT

## 2025-01-23 PROCEDURE — 63710000001 SERTRALINE 50 MG TABLET: Performed by: STUDENT IN AN ORGANIZED HEALTH CARE EDUCATION/TRAINING PROGRAM

## 2025-01-23 PROCEDURE — 63710000001 BUPROPION XL 150 MG TABLET SUSTAINED-RELEASE 24 HOUR: Performed by: STUDENT IN AN ORGANIZED HEALTH CARE EDUCATION/TRAINING PROGRAM

## 2025-01-23 PROCEDURE — 63710000001 FERROUS SULFATE 325 (65 FE) MG TABLET: Performed by: STUDENT IN AN ORGANIZED HEALTH CARE EDUCATION/TRAINING PROGRAM

## 2025-01-23 PROCEDURE — 63710000001 INSULIN GLARGINE PER 5 UNITS: Performed by: STUDENT IN AN ORGANIZED HEALTH CARE EDUCATION/TRAINING PROGRAM

## 2025-01-23 PROCEDURE — 63710000001 APIXABAN 5 MG TABLET: Performed by: STUDENT IN AN ORGANIZED HEALTH CARE EDUCATION/TRAINING PROGRAM

## 2025-01-23 PROCEDURE — 82948 REAGENT STRIP/BLOOD GLUCOSE: CPT | Performed by: STUDENT IN AN ORGANIZED HEALTH CARE EDUCATION/TRAINING PROGRAM

## 2025-01-23 PROCEDURE — 85025 COMPLETE CBC W/AUTO DIFF WBC: CPT | Performed by: STUDENT IN AN ORGANIZED HEALTH CARE EDUCATION/TRAINING PROGRAM

## 2025-01-23 PROCEDURE — 84100 ASSAY OF PHOSPHORUS: CPT | Performed by: STUDENT IN AN ORGANIZED HEALTH CARE EDUCATION/TRAINING PROGRAM

## 2025-01-23 PROCEDURE — 63710000001 ACETAMINOPHEN EXTRA STRENGTH 500 MG TABLET: Performed by: PHYSICIAN ASSISTANT

## 2025-01-23 PROCEDURE — 97602 WOUND(S) CARE NON-SELECTIVE: CPT

## 2025-01-23 PROCEDURE — 63710000001 MAGNESIUM OXIDE -MG SUPPLEMENT 400 (240 MG) MG TABLET

## 2025-01-23 PROCEDURE — 80076 HEPATIC FUNCTION PANEL: CPT | Performed by: STUDENT IN AN ORGANIZED HEALTH CARE EDUCATION/TRAINING PROGRAM

## 2025-01-23 PROCEDURE — 63710000001 INSULIN LISPRO (HUMAN) PER 5 UNITS: Performed by: STUDENT IN AN ORGANIZED HEALTH CARE EDUCATION/TRAINING PROGRAM

## 2025-01-23 PROCEDURE — 36415 COLL VENOUS BLD VENIPUNCTURE: CPT | Performed by: STUDENT IN AN ORGANIZED HEALTH CARE EDUCATION/TRAINING PROGRAM

## 2025-01-23 PROCEDURE — 63710000001 HYDROCODONE-ACETAMINOPHEN 5-325 MG TABLET: Performed by: STUDENT IN AN ORGANIZED HEALTH CARE EDUCATION/TRAINING PROGRAM

## 2025-01-23 RX ORDER — POTASSIUM CHLORIDE 750 MG/1
40 CAPSULE, EXTENDED RELEASE ORAL ONCE
Status: COMPLETED | OUTPATIENT
Start: 2025-01-23 | End: 2025-01-23

## 2025-01-23 RX ORDER — HYDROCODONE BITARTRATE AND ACETAMINOPHEN 5; 325 MG/1; MG/1
1 TABLET ORAL EVERY 8 HOURS PRN
Status: DISCONTINUED | OUTPATIENT
Start: 2025-01-23 | End: 2025-01-25

## 2025-01-23 RX ORDER — ATORVASTATIN CALCIUM 10 MG/1
10 TABLET, FILM COATED ORAL NIGHTLY
Status: DISCONTINUED | OUTPATIENT
Start: 2025-01-23 | End: 2025-01-30 | Stop reason: HOSPADM

## 2025-01-23 RX ADMIN — FERROUS SULFATE TAB 325 MG (65 MG ELEMENTAL FE) 325 MG: 325 (65 FE) TAB at 09:29

## 2025-01-23 RX ADMIN — APIXABAN 5 MG: 5 TABLET, FILM COATED ORAL at 21:56

## 2025-01-23 RX ADMIN — POTASSIUM CHLORIDE 40 MEQ: 750 CAPSULE, EXTENDED RELEASE ORAL at 09:27

## 2025-01-23 RX ADMIN — APIXABAN 5 MG: 5 TABLET, FILM COATED ORAL at 09:28

## 2025-01-23 RX ADMIN — BACLOFEN 10 MG: 10 TABLET ORAL at 01:17

## 2025-01-23 RX ADMIN — Medication 10 ML: at 09:30

## 2025-01-23 RX ADMIN — HYDROCODONE BITARTRATE AND ACETAMINOPHEN 1 TABLET: 5; 325 TABLET ORAL at 09:28

## 2025-01-23 RX ADMIN — INSULIN LISPRO 4 UNITS: 100 INJECTION, SOLUTION INTRAVENOUS; SUBCUTANEOUS at 17:58

## 2025-01-23 RX ADMIN — SERTRALINE HYDROCHLORIDE 50 MG: 50 TABLET ORAL at 21:56

## 2025-01-23 RX ADMIN — BACLOFEN 10 MG: 10 TABLET ORAL at 10:46

## 2025-01-23 RX ADMIN — INSULIN LISPRO 4 UNITS: 100 INJECTION, SOLUTION INTRAVENOUS; SUBCUTANEOUS at 13:13

## 2025-01-23 RX ADMIN — Medication 400 MG: at 09:29

## 2025-01-23 RX ADMIN — Medication 10 ML: at 21:56

## 2025-01-23 RX ADMIN — INSULIN LISPRO 2 UNITS: 100 INJECTION, SOLUTION INTRAVENOUS; SUBCUTANEOUS at 09:28

## 2025-01-23 RX ADMIN — ATORVASTATIN CALCIUM 10 MG: 10 TABLET, FILM COATED ORAL at 21:56

## 2025-01-23 RX ADMIN — BACLOFEN 10 MG: 10 TABLET ORAL at 21:56

## 2025-01-23 RX ADMIN — BUPROPION HYDROCHLORIDE 150 MG: 150 TABLET, EXTENDED RELEASE ORAL at 09:29

## 2025-01-23 RX ADMIN — ACETAMINOPHEN 1000 MG: 500 TABLET ORAL at 06:34

## 2025-01-23 RX ADMIN — INSULIN LISPRO 6 UNITS: 100 INJECTION, SOLUTION INTRAVENOUS; SUBCUTANEOUS at 21:56

## 2025-01-23 RX ADMIN — HYDROCODONE BITARTRATE AND ACETAMINOPHEN 1 TABLET: 5; 325 TABLET ORAL at 17:58

## 2025-01-23 RX ADMIN — INSULIN GLARGINE 10 UNITS: 100 INJECTION, SOLUTION SUBCUTANEOUS at 09:29

## 2025-01-23 NOTE — PLAN OF CARE
Goal Outcome Evaluation:  Plan of Care Reviewed With: patient        Progress: no change  Outcome Evaluation: Pt is alert and oriented. Given PRN baclofen and norco as ordered for pain. Maintained iso for contact spore. Wound care completed as ordered. Continue plan of care.   Pt refused to be turned most of day, educated on turning and repositioning weight to prevent pressure injuries. Repositioned pt in bed but was refusing pillow under sides.

## 2025-01-23 NOTE — CONSULTS
Nutrition Services    Patient Name: Jose Shaikh  YOB: 1960  MRN: [id number]  Admission date: 1/21/2025      CLINICAL NUTRITION ASSESSMENT      Reason for Assessment  Identified at Risk by Screening Criteria      H&P:  Past Medical History:   Diagnosis Date    Absence of toe of right foot     Acute osteomyelitis of left calcaneus  08/18/2021    Anxiety and depression     Arthritis     Cancer     Chronic pain     STATES HIS PAIN IS 10/10 AAT    Claustrophobia     Corns and callus     Diabetic ulcer of left heel associated with type 2 DM 08/18/2021    Diabetic ulcer of left heel associated with type 2 DM 07/06/2021    Diabetic ulcer of right midfoot associated with type 2 DM 08/18/2021    Difficulty walking     Essential hypertension 08/31/2021    Hammertoe     Hyperlipidemia LDL goal <100 08/31/2021    Ingrown toenail     Obesity     Paroxysmal atrial fibrillation 08/31/2021    Polyneuropathy     Pressure ulcer, stage 1     Tinea unguium     Type 2 diabetes mellitus with polyneuropathy         Current Problems:   Active Hospital Problems    Diagnosis     **Unable to care for self         Nutrition/Diet History         Narrative   Pt known to writer from previous admission. S/p recent discharge, he was bed bound. Did not eat any meals x 2-3 days. Per recall, pt has been eating +/- 1200 kcal/day. Intentional wt loss, to be eligible for hip surgery per MD recs. Goal wt ~285#. He has been avoiding carbs from bread, potatoes, pasta. Intake primarily simple carbs (sweets- cheesecake, pudding, cake, chips/tortilla chips) with minimal protein intake. Discussed need for carbs/protein to maintain lean muscle mass, wound healing. C/o difficulty chewing r/t cracked Rt wisdom tooth. Will choose items he can tolerate; refused alternate texture. Food preferences updated. Agreeable to chocolate ONS, yogurt TID for kcal/protein intake.   Significant wt loss per EMR; he has been on Lasix for diuresis c/w low calorie  "diet. RN will reweigh pt, as able.  Skin- scattered trauma/scabs to BUE and BLE. Buttocks with serous blistering to outer buttocks area, upper back with open area along old incisional scar, open wound with palpable bone to remaining right foot/stump site (wound RN note, 1/22).  No GI complaints.   RD to follow per protocol. Continue diet as ordered, yogurt TID and ONS BID.     Anthropometrics        Current Height, Weight Height: 188 cm (74\")  Weight: 133 kg (293 lb 3.4 oz)   Current BMI Body mass index is 37.65 kg/m².   BMI Classification Obese Class II   % %, IBW 86.3 kg   Adjusted Body Weight (ABW)    Weight Hx  Wt Readings from Last 11 Encounters:   01/22/25 1513 133 kg (293 lb 3.4 oz)   01/22/25 0036 83.5 kg (184 lb)   01/14/25 0548 (!) 184 kg (405 lb 3.2 oz)   01/13/25 0431 (!) 186 kg (410 lb 9.6 oz)   01/10/25 0340 (!) 198 kg (435 lb 10.1 oz)   01/09/25 0305 (!) 198 kg (436 lb 8.2 oz)   01/08/25 0502 (!) 197 kg (434 lb 8.4 oz)   01/07/25 0704 (!) 195 kg (430 lb 1.6 oz)   12/19/24 0202 (!) 187 kg (411 lb 13.1 oz)   12/18/24 1455 (!) 195 kg (430 lb 12.5 oz)   12/15/24 1912 (!) 199 kg (438 lb 11.5 oz)   12/15/24 1846 (!) 199 kg (439 lb 6 oz)   12/12/24 0730 (!) 175 kg (384 lb 14.8 oz)   12/06/24 2226 (!) 177 kg (389 lb 12.4 oz)   11/30/24 1436 (!) 174 kg (383 lb 9.6 oz)   11/25/24 1600 (!) 166 kg (365 lb 15.4 oz)   11/19/24 1534 (!) 167 kg (369 lb)   11/08/24 1527 (!) 168 kg (369 lb 11.4 oz)   10/26/24 0615 (!) 167 kg (368 lb 2.7 oz)   10/26/24 0609 (!) 155 kg (341 lb 11.4 oz)   10/09/24 0950 (!) 155 kg (341 lb 7.9 oz)   10/02/24 0805 (!) 150 kg (330 lb 0.5 oz)          Wt Change Observation Wt down 112# in 1 week; question accuracy. UBW of 410-435#     Estimated/Assessed Needs  Estimated Needs based on: Ideal Body Weight 86 kg       Energy Requirements 25-35 kcal/kg   EST Needs (kcal/day) 3409-1106 kcal       Protein Requirements 1.5 g/kg -1.7 g/kg   EST Daily Needs (g/day) 130-145 g       Fluid " Requirements 20-25 ml/kg    Estimated Needs (mL/day) 0640-7678 mL     Labs/Medications         Pertinent Labs Reviewed. CHF, hyponatremia. DM, hyperglycemia   Results from last 7 days   Lab Units 01/23/25  0605 01/21/25  1707   SODIUM mmol/L 129* 135*   POTASSIUM mmol/L 3.4* 3.2*   CHLORIDE mmol/L 96* 94*   CO2 mmol/L 24.4 26.8   BUN mg/dL 11 6*   CREATININE mg/dL 0.50* 0.59*   CALCIUM mg/dL 8.0* 8.4*   BILIRUBIN mg/dL 1.0 1.4*   ALK PHOS U/L 215* 225*   ALT (SGPT) U/L 19 22   AST (SGOT) U/L 43* 42*   GLUCOSE mg/dL 212* 177*     Results from last 7 days   Lab Units 01/23/25  0605   MAGNESIUM mg/dL 1.6   PHOSPHORUS mg/dL 2.6   HEMOGLOBIN g/dL 11.3*   HEMATOCRIT % 37.2*     COVID19   Date Value Ref Range Status   01/13/2025 Not Detected Not Detected - Ref. Range Final     Lab Results   Component Value Date    HGBA1C 4.40 (L) 08/28/2024         Pertinent Medications Reviewed.     Malnutrition Severity Assessment      Patient meets criteria for : Severe Malnutrition  Malnutrition Type (Last 8 Hours)       Malnutrition Severity Assessment       Row Name 01/23/25 1447       Malnutrition Severity Assessment    Malnutrition Type Acute Disease or Injury - Related Malnutrition      Row Name 01/23/25 1447       Insufficient Energy Intake     Insufficient Energy Intake Findings Severe    Insufficient Energy Intake  < or equal to 50% of est. energy requirement for > or equal to 5d)      Row Name 01/23/25 1447       Muscle Loss    Loss of Muscle Mass Findings Moderate    Synagogue Region Moderate - slight depression    Acromion Bone Region Moderate - acromion may slightly protrude      Row Name 01/23/25 1447       Fat Loss    Subcutaneous Fat Loss Findings Moderate    Orbital Region  Moderate -  somewhat hollowness, slightly dark circles    Upper Arm Region Moderate - some fat tissue, not ample      Row Name 01/23/25 1447       Criteria Met (Must meet criteria for severity in at least 2 of these categories: M Wasting, Fat Loss,  Fluid, Secondary Signs, Wt. Status, Intake)    Patient meets criteria for  Severe Malnutrition                     Nutrition Diagnosis         Nutrition Dx Problem 1 Severe malnutrition related to Inability to consume sufficient energy as evidenced by inadequate energy intake., decreased appetite., body composition changes., patient report., impaired skin integrity., and edema masking potential dry wt loss     Nutrition Intervention           Current Nutrition Orders & Evaluation of Intake       Current PO Diet Diet: Diabetic; Consistent Carbohydrate; Fluid Consistency: Thin (IDDSI 0)   Supplement Orders Placed This Encounter      Patient is on Glucommander      Dietary Nutrition Supplements Boost Glucose Control (Glucerna Shake); chocolate           Nutrition Intervention/Prescription        Continue carb controlled diet.  Suggest MVM, Vit C- for wound healing.  Boost GC BID.        Medical Nutrition Therapy/Nutrition Education          Learner     Readiness Patient  Acceptance     Method     Response Explanation  Verbalizes understanding     Monitor/Evaluation        Monitor Per protocol, PO intake, Supplement intake, Pertinent labs, Skin status     Nutrition Discharge Plan         Recommend to continue oral nutrition supplements on discharge.      Electronically signed by:  Aileen Polanco RD  01/23/25 14:53 EST

## 2025-01-23 NOTE — PLAN OF CARE
Goal Outcome Evaluation:  Plan of Care Reviewed With: patient        Progress: no change  Outcome Evaluation: OT evaluation completed with further occupational therapy services recommended when he is discharged to a skilled nursing facility.  The patient is a good rehab candidate and has potential to be able to transfer and walk again with continued therapy services within a rehab facility.    Anticipated Discharge Disposition (OT): sub acute care setting

## 2025-01-23 NOTE — THERAPY EVALUATION
Patient Name: Jose Shaikh  : 1960    MRN: [id number]                              Today's Date: 2025       Admit Date: 2025    Visit Dx:     ICD-10-CM ICD-9-CM   1. Impaired mobility and ADLs  Z74.09 V49.89    Z78.9    2. Difficulty in walking  R26.2 719.7   3. Multiple wounds of skin  T14.8XXA 959.8   4. Obesity, unspecified class, unspecified obesity type, unspecified whether serious comorbidity present  E66.9 278.00     Patient Active Problem List   Diagnosis    Diabetic ulcer of left heel associated with type 2 DM    Acute osteomyelitis of left calcaneus     Diabetic ulcer of left heel associated with type 2 DM    Diabetic ulcer of right midfoot associated with type 2 DM    Paroxysmal atrial fibrillation    Essential hypertension    Hyperlipidemia LDL goal <100    Cellulitis and abscess of foot    High alkaline phosphatase level    Osteomyelitis    Onychomycosis    Onychocryptosis    Foot pain, bilateral    Osteomyelitis of foot, right, acute    Cellulitis of right foot    Type 2 diabetes mellitus, with long-term current use of insulin    Class 3 severe obesity due to excess calories with serious comorbidity and body mass index (BMI) of 45.0 to 49.9 in adult    Anxiety disorder, unspecified    Claustrophobia    Dependence on wheelchair    Depression, unspecified    Long term (current) use of anticoagulants    Long term (current) use of oral hypoglycemic drugs    Wound of foot    Ulcer of right foot    Orthostatic hypotension    Other chronic osteomyelitis, right ankle and foot    Personal history of nicotine dependence    Thrombocytopenia, unspecified    Unspecified open wound, right foot, initial encounter    Diabetic foot infection    Subacute osteomyelitis of right foot    Right foot pain    Sepsis    Onychomycosis    Foot pain, left    COVID-19 virus detected    Absence of toe of right foot    Corns and callosity    Disability of walking    Fracture    Limb swelling    Polyneuropathy     Pressure ulcer, stage 1    Shortness of breath    Generalized weakness    Debility    Onychomycosis    Noncompliance of patient with dietary regimen    Morbid obesity    COVID-19 virus infection    COVID-19    CHF exacerbation    Unable to care for self     Past Medical History:   Diagnosis Date    Absence of toe of right foot     Acute osteomyelitis of left calcaneus  08/18/2021    Anxiety and depression     Arthritis     Cancer     Chronic pain     STATES HIS PAIN IS 10/10 AAT    Claustrophobia     Corns and callus     Diabetic ulcer of left heel associated with type 2 DM 08/18/2021    Diabetic ulcer of left heel associated with type 2 DM 07/06/2021    Diabetic ulcer of right midfoot associated with type 2 DM 08/18/2021    Difficulty walking     Essential hypertension 08/31/2021    Hammertoe     Hyperlipidemia LDL goal <100 08/31/2021    Ingrown toenail     Obesity     Paroxysmal atrial fibrillation 08/31/2021    Polyneuropathy     Pressure ulcer, stage 1     Tinea unguium     Type 2 diabetes mellitus with polyneuropathy      Past Surgical History:   Procedure Laterality Date    CYST REMOVAL      center of back; benign    EYE SURGERY      INCISION AND DRAINAGE ABSCESS      back    INCISION AND DRAINAGE LEG Right 12/10/2021    Procedure: INCISION AND DRAINAGE LOWER EXTREMITY;  Surgeon: Ash Leyva DPM;  Location: [facility] OR;  Service: Podiatry;  Laterality: Right;    OTHER SURGICAL HISTORY      Surgical clips left foot    TOE SURGERY Right     Removal of 5th toe    TRANS METATARSAL AMPUTATION Right 12/02/2021    Procedure: AMPUTATION TRANS METATARSAL;  Surgeon: Ash Leyva DPM;  Location: [facility] OR;  Service: Podiatry;  Laterality: Right;    VASCULAR SURGERY      WRIST SURGERY Left     repair of injury      General Information       Row Name 01/23/25 1118          OT Time and Intention    Document Type evaluation  -PG     Mode of Treatment individual therapy;occupational therapy   -       Row Name 01/23/25 1118          General Information    Prior Level of Function max assist:;ADL's;dependent:  -PG     Barriers to Rehab none identified  -       Row Name 01/23/25 1118          Occupational Profile    Reason for Services/Referral (Occupational Profile) Patient is a 64-year-old male admitted to the hospital for C. difficile.  Patient is being evaluated by Occupational Therapy due to recent decline in ADL function.  No previous OT services previous  -       Row Name 01/23/25 1118          Living Environment    People in Home alone  -       Row Name 01/23/25 1118          Cognition    Orientation Status (Cognition) oriented x 4  -       Row Name 01/23/25 1118          Safety Issues/Impairments Affecting Functional Mobility    Impairments Affecting Function (Mobility) balance;pain;range of motion (ROM);strength;endurance/activity tolerance  -               User Key  (r) = Recorded By, (t) = Taken By, (c) = Cosigned By      Initials Name Provider Type     Kodak Matos, OT Occupational Therapist                     Mobility/ADL's       Row Name 01/23/25 1119          Bed Mobility    Bed Mobility bed mobility (all) activities  -     All Activities, [place] (Bed Mobility) dependent (less than 25% patient effort);maximum assist (25% patient effort)  -       Row Name 01/23/25 1119          Activities of Daily Living    BADL Assessment/Intervention bathing;upper body dressing;lower body dressing;grooming;toileting  -       Row Name 01/23/25 1119          Bathing Assessment/Intervention    [place] Level (Bathing) bathing skills;moderate assist (50% patient effort)  -       Row Name 01/23/25 1119          Upper Body Dressing Assessment/Training    [place] Level (Upper Body Dressing) upper body dressing skills;minimum assist (75% patient effort)  -       Row Name 01/23/25 1119          Lower Body Dressing Assessment/Training    [place] Level (Lower Body Dressing)  lower body dressing skills;dependent (less than 25% patient effort)  -PG       Row Name 01/23/25 1119          Grooming Assessment/Training    [place] Level (Grooming) grooming skills;set up  -PG       Row Name 01/23/25 1119          Toileting Assessment/Training    [place] Level (Toileting) toileting skills;dependent (less than 25% patient effort)  -PG               User Key  (r) = Recorded By, (t) = Taken By, (c) = Cosigned By      Initials Name Provider Type    PG Kodak Matos OT Occupational Therapist                   Obj/Interventions       Row Name 01/23/25 1120          Sensory Assessment (Somatosensory)    Sensory Assessment (Somatosensory) sensation intact  -PG       Row Name 01/23/25 1120          Vision Assessment/Intervention    Visual Impairment/Limitations WFL  -PG       Row Name 01/23/25 1120          Range of Motion Comprehensive    General Range of Motion no range of motion deficits identified  -PG       Row Name 01/23/25 1120          Strength Comprehensive (MMT)    General Manual Muscle Testing (MMT) Assessment no strength deficits identified  -PG       Row Name 01/23/25 1120          Motor Skills    Motor Skills coordination;functional endurance  -PG     Coordination WFL  -PG     Functional Endurance fair  -PG               User Key  (r) = Recorded By, (t) = Taken By, (c) = Cosigned By      Initials Name Provider Type    PG Kodak Matos OT Occupational Therapist                   Goals/Plan    No documentation.                  Clinical Impression       [facility] Name 01/23/25 1121          Pain Assessment    Pretreatment Pain Rating 0/10 - no pain  -PG     Posttreatment Pain Rating 0/10 - no pain  -PG       [facility] Name 01/23/25 1121          Plan of Care Review    Plan of Care Reviewed With patient  -PG     Progress no change  -PG     Outcome Evaluation OT evaluation completed with further occupational therapy services recommended when he is discharged to a skilled nursing facility.  The  patient is a good rehab candidate and has potential to be able to transfer and walk again with continued therapy services within a rehab facility.  -PG       Row Name 01/23/25 1121          Therapy Assessment/Plan (OT)    Patient/Family Therapy Goal Statement (OT) I want to walk again  -PG     Criteria for Skilled Therapeutic Interventions Met (OT) no;does not meet criteria for skilled intervention  -PG     Therapy Frequency (OT) evaluation only  -PG       Row Name 01/23/25 1121          Therapy Plan Review/Discharge Plan (OT)    Anticipated Discharge Disposition (OT) sub acute care setting  -PG               User Key  (r) = Recorded By, (t) = Taken By, (c) = Cosigned By      Initials Name Provider Type    PG Kodak Matos, OT Occupational Therapist                   Outcome Measures       Row Name 01/23/25 1123          How much help from another is currently needed...    Putting on and taking off regular lower body clothing? 1  -PG     Bathing (including washing, rinsing, and drying) 2  -PG     Toileting (which includes using toilet bed pan or urinal) 1  -PG     Putting on and taking off regular upper body clothing 3  -PG     Taking care of personal grooming (such as brushing teeth) 4  -PG     Eating meals 4  -PG     AM-PAC 6 Clicks Score (OT) 15  -PG       Row Name 01/23/25 1123          Functional Assessment    Outcome Measure Options AM-PAC 6 Clicks Daily Activity (OT);Optimal Instrument  -PG       Row Name 01/23/25 1123          Optimal Instrument    Optimal Instrument Optimal - 3  -PG     Bending/Stooping 5  -PG     Standing 5  -PG     Reaching 1  -PG     From the list, choose the 3 activities you would most like to be able to do without any difficulty Bending/stooping;Standing;Reaching  -PG     Total Score Optimal - 3 11  -PG               User Key  (r) = Recorded By, (t) = Taken By, (c) = Cosigned By      Initials Name Provider Type    PG Kodak Matos, OT Occupational Therapist                     Occupational Therapy Education        No education to display                  OT Recommendation and Plan  Therapy Frequency (OT): evaluation only  Plan of Care Review  Plan of Care Reviewed With: patient  Progress: no change  Outcome Evaluation: OT evaluation completed with further occupational therapy services recommended when he is discharged to a skilled nursing facility.  The patient is a good rehab candidate and has potential to be able to transfer and walk again with continued therapy services within a rehab facility.     Time Calculation:   Evaluation Complexity (OT)  Review Occupational Profile/Medical/Therapy History Complexity: brief/low complexity  Assessment, Occupational Performance/Identification of Deficit Complexity: 1-3 performance deficits  Clinical Decision Making Complexity (OT): problem focused assessment/low complexity  Overall Complexity of Evaluation (OT): low complexity     Time Calculation- OT       Row Name 01/23/25 1124             Time Calculation- OT    OT Received On 01/23/25  -PG         Untimed Charges    OT Eval/Re-eval Minutes 30  -PG         Total Minutes    Untimed Charges Total Minutes 30  -PG       Total Minutes 30  -PG                User Key  (r) = Recorded By, (t) = Taken By, (c) = Cosigned By      Initials Name Provider Type    PG Kodak Matos OT Occupational Therapist                  Therapy Charges for Today       Code Description Service Date Service Provider Modifiers Qty    [id number] HC OT EVAL LOW COMPLEXITY 2 1/23/2025 Kodak Matos OT GO 1                 Kodak Matos OT  1/23/2025

## 2025-01-23 NOTE — PROGRESS NOTES
[facility]   Hospitalist Progress Note  Date: 2025  Patient Name: Jose Shaikh  : 1960  MRN: [id number]  Date of admission: 2025  Room/Bed: 3021/1      Subjective   Subjective     Chief Complaint: unable to take care of self     Summary:Jose Shaikh is a 64 y.o. male  with history of chronic diastolic congestive heart failure, paroxysmal A-fib on Eliquis, type 2 diabetes, morbid obesity, debility with wheelchair dependence, osteoarthritis of hip, chronic venous stasis, deconditioning, hemorrhoids and anasarca who presented to ER after not being able to take care of himself at home.  Patient reported that he had been laying in his own feces/urine for last several days.  He lives alone at home and came to the ER for cleanup.  His potassium was low when he presented to ER which was repleted.  Vital stable.  ER  was involved as patient is not interested in going to nursing home in [place].   contacted [facility] nursing and rehab; patient agreeable to start the referral.  Admitted for pending discharge to nursing home.  No other active issues.  Patient was laying in bed during my evaluation and was getting clean.  No active issues.  Wound care on board. Pending disposition.    Interval Followup: no acute events overnight. Stated on as needed [place] for his chronic hip pain. No other active complaints. C diff negative; only showed colonization.    Review of Systems    All systems reviewed and negative except for what is outlined above.      Objective   Objective     Vitals:   Temp:  [97.7 °F (36.5 °C)-98 °F (36.7 °C)] 97.9 °F (36.6 °C)  Heart Rate:  [57-92] 87  Resp:  [16-22] 20  BP: ()/(59-97) 104/64    Physical Exam   General: Awake, alert, NAD  Cardiovascular: RRR, no murmurs   Pulmonary: CTA bilaterally; no wheezes; no conversational dyspnea  Gastrointestinal: S/ND/NT, +BS  Neuro: alert, awake, oriented x 3; speech clear; no tremor      Result Review     Result Review:  I have personally reviewed these results:  [x]  Laboratory      Lab 01/23/25  0605 01/21/25  1707   WBC 5.45 8.26   HEMOGLOBIN 11.3* 13.0   HEMATOCRIT 37.2* 44.1   PLATELETS 157 221   NEUTROS ABS 3.33 5.95   IMMATURE GRANS (ABS) 0.01 0.03   LYMPHS ABS 1.28 1.54   MONOS ABS 0.69 0.60   EOS ABS 0.08 0.06   MCV 69.7* 71.4*         Lab 01/23/25  0605 01/21/25  1707   SODIUM 129* 135*   POTASSIUM 3.4* 3.2*   CHLORIDE 96* 94*   CO2 24.4 26.8   ANION GAP 8.6 14.2   BUN 11 6*   CREATININE 0.50* 0.59*   EGFR 112.5 107.0   GLUCOSE 212* 177*   CALCIUM 8.0* 8.4*   MAGNESIUM 1.6  --    PHOSPHORUS 2.6  --          Lab 01/23/25  0605 01/21/25  1707   TOTAL PROTEIN 5.7* 7.0   ALBUMIN 2.6* 3.1*   GLOBULIN  --  3.9   ALT (SGPT) 19 22   AST (SGOT) 43* 42*   BILIRUBIN 1.0 1.4*   INDIRECT BILIRUBIN 0.6  --    BILIRUBIN DIRECT 0.4*  --    ALK PHOS 215* 225*                     Brief Urine Lab Results  (Last result in the past 365 days)        Color   Clarity   Blood   Leuk Est   Nitrite   Protein   CREAT   Urine HCG        01/21/25 1703 Dark Yellow   Clear   Negative   Small (1+)   Negative   Negative                 [x]  Microbiology   Microbiology Results (last 10 days)       Procedure Component Value - Date/Time    Clostridioides difficile Toxin - Stool, Per Rectum [[id number]]  (Abnormal) Collected: 01/22/25 1517    Lab Status: Final result Specimen: Stool from Per Rectum Updated: 01/22/25 1621    Narrative:      The following orders were created for panel order Clostridioides difficile Toxin - Stool, Per Rectum.  Procedure                               Abnormality         Status                     ---------                               -----------         ------                     Clostridioides difficile...[[id number]]  Abnormal            Final result                 Please view results for these tests on the individual orders.    Clostridioides difficile Toxin, PCR - Stool, Per Rectum [[id number]]  (Abnormal)  Collected: 01/22/25 1517    Lab Status: Final result Specimen: Stool from Per Rectum Updated: 01/22/25 1621     Toxigenic C. difficile by PCR Positive     027 Toxin Presumptive Negative    Narrative:      DNA from a toxigenic strain of C.difficile has been detected. Antigen testing for the presence of free C.difficile toxin is currently in progress, to help determine the clinical significance of this PCR result.     Clostridioides difficile toxin Ag, Reflex - Stool, Per Rectum [[id number]]  (Normal) Collected: 01/22/25 1517    Lab Status: Final result Specimen: Stool from Per Rectum Updated: 01/22/25 1645     C.diff Toxin Ag Negative    Narrative:      DNA from a toxigenic strain of C.difficile was detected, although the free toxin itself was not detected. These findings are consistent with C.difficile colonization and may not reflect actual C.difficile infection. Clinical correlation needed.    Urine Culture - Urine, Urine, Clean Catch [[id number]] Collected: 01/21/25 1703    Lab Status: Final result Specimen: Urine, Clean Catch Updated: 01/22/25 1141     Urine Culture 25,000 CFU/mL Normal Urogenital Berenice    Narrative:      Colonization of the urinary tract without infection is common. Treatment is discouraged unless the patient is symptomatic, pregnant, or undergoing an invasive urologic procedure.          [x]  Radiology  XR Chest 1 View    Result Date: 1/21/2025  Impression: An acute pulmonary process is not apparent. Electronically Signed: Jorge Urban MD  1/21/2025 4:22 PM EST  Workstation ID: [id number]   []  EKG/Telemetry   []  Cardiology/Vascular   []  Pathology  []  Old records  []  Other:    Assessment & Plan   Assessment / Plan     Assessment:  Unable to take care of himself at home  History of chronic diastolic congestive heart failure  paroxysmal A-fib on Eliquis  Type 2 diabetes  Morbid obesity  Debility with wheelchair dependence  Osteoarthritis of hip  Chronic venous stasis  Deconditioning  Hemorrhoids    Anasarca    Plan:  Patient is being managed in outpatient bed.  Presented after not being able to take care of himself and had been laying in feces/urine for past 3 days.  Patient was cleaned and  working on his disposition.  Referral sent to [facility] nursing and rehab at [place].  Pending disposition.  Home medication reconciled and restarted.  Patient has a history of chronic loose stool, he had multiple episodes since he was admitted for which nursing staff send to C. difficile.  Has history of C. difficile colonization.  Found to have C diff colonization; negative for toxin.  No leukocytosis and renal function stable.   On as needed baclofen for muscle spasm.  Started on PRN Norco for pain.  Continue rest of current management.  Lab work in AM.  Patient to be discharged home if he refuses rehab placement.  Pending disposition.     Discussed with RN.    VTE Prophylaxis:  Pharmacologic VTE prophylaxis orders are present.        CODE STATUS:   Code Status (Patient has no pulse and is not breathing): CPR (Attempt to Resuscitate)  Medical Interventions (Patient has pulse or is breathing): Full Support      Electronically signed by Janis Lam MD, 01/23/25, 8:21 AM EST.

## 2025-01-23 NOTE — PLAN OF CARE
Goal Outcome Evaluation:  Plan of Care Reviewed With: patient        Progress: no change     Unchanged from previous assessment. PRN Tylenol x 2 this shift, PRN baclofen x 1. Pt refused K pad. [facility]

## 2025-01-24 LAB
ANION GAP SERPL CALCULATED.3IONS-SCNC: 8.1 MMOL/L (ref 5–15)
ANISOCYTOSIS BLD QL: NORMAL
BASOPHILS # BLD AUTO: 0.08 10*3/MM3 (ref 0–0.2)
BASOPHILS NFR BLD AUTO: 1.7 % (ref 0–1.5)
BUN SERPL-MCNC: 9 MG/DL (ref 8–23)
BUN/CREAT SERPL: 25 (ref 7–25)
CALCIUM SPEC-SCNC: 7.9 MG/DL (ref 8.6–10.5)
CHLORIDE SERPL-SCNC: 98 MMOL/L (ref 98–107)
CO2 SERPL-SCNC: 24.9 MMOL/L (ref 22–29)
CREAT SERPL-MCNC: 0.36 MG/DL (ref 0.76–1.27)
DEPRECATED RDW RBC AUTO: 60.5 FL (ref 37–54)
EGFRCR SERPLBLD CKD-EPI 2021: 124.2 ML/MIN/1.73
EOSINOPHIL # BLD AUTO: 0.14 10*3/MM3 (ref 0–0.4)
EOSINOPHIL NFR BLD AUTO: 2.9 % (ref 0.3–6.2)
ERYTHROCYTE [DISTWIDTH] IN BLOOD BY AUTOMATED COUNT: 24.6 % (ref 12.3–15.4)
GLUCOSE BLDC GLUCOMTR-MCNC: 141 MG/DL (ref 70–99)
GLUCOSE BLDC GLUCOMTR-MCNC: 198 MG/DL (ref 70–99)
GLUCOSE BLDC GLUCOMTR-MCNC: 213 MG/DL (ref 70–99)
GLUCOSE BLDC GLUCOMTR-MCNC: 243 MG/DL (ref 70–99)
GLUCOSE SERPL-MCNC: 136 MG/DL (ref 65–99)
HCT VFR BLD AUTO: 35.6 % (ref 37.5–51)
HGB BLD-MCNC: 10.7 G/DL (ref 13–17.7)
IMM GRANULOCYTES # BLD AUTO: 0.02 10*3/MM3 (ref 0–0.05)
IMM GRANULOCYTES NFR BLD AUTO: 0.4 % (ref 0–0.5)
LYMPHOCYTES # BLD AUTO: 1.22 10*3/MM3 (ref 0.7–3.1)
LYMPHOCYTES NFR BLD AUTO: 25.7 % (ref 19.6–45.3)
MAGNESIUM SERPL-MCNC: 1.7 MG/DL (ref 1.6–2.4)
MCH RBC QN AUTO: 21.1 PG (ref 26.6–33)
MCHC RBC AUTO-ENTMCNC: 30.1 G/DL (ref 31.5–35.7)
MCV RBC AUTO: 70.4 FL (ref 79–97)
MICROCYTES BLD QL: NORMAL
MONOCYTES # BLD AUTO: 0.57 10*3/MM3 (ref 0.1–0.9)
MONOCYTES NFR BLD AUTO: 12 % (ref 5–12)
NEUTROPHILS NFR BLD AUTO: 2.72 10*3/MM3 (ref 1.7–7)
NEUTROPHILS NFR BLD AUTO: 57.3 % (ref 42.7–76)
NRBC BLD AUTO-RTO: 0 /100 WBC (ref 0–0.2)
PHOSPHATE SERPL-MCNC: 2.8 MG/DL (ref 2.5–4.5)
PLAT MORPH BLD: NORMAL
PLATELET # BLD AUTO: 157 10*3/MM3 (ref 140–450)
PMV BLD AUTO: ABNORMAL FL
POTASSIUM SERPL-SCNC: 3.7 MMOL/L (ref 3.5–5.2)
RBC # BLD AUTO: 5.06 10*6/MM3 (ref 4.14–5.8)
SODIUM SERPL-SCNC: 131 MMOL/L (ref 136–145)
WBC MORPH BLD: NORMAL
WBC NRBC COR # BLD AUTO: 4.75 10*3/MM3 (ref 3.4–10.8)

## 2025-01-24 PROCEDURE — A9270 NON-COVERED ITEM OR SERVICE: HCPCS | Performed by: STUDENT IN AN ORGANIZED HEALTH CARE EDUCATION/TRAINING PROGRAM

## 2025-01-24 PROCEDURE — 63710000001 FERROUS SULFATE 325 (65 FE) MG TABLET: Performed by: STUDENT IN AN ORGANIZED HEALTH CARE EDUCATION/TRAINING PROGRAM

## 2025-01-24 PROCEDURE — 99213 OFFICE O/P EST LOW 20 MIN: CPT | Performed by: STUDENT IN AN ORGANIZED HEALTH CARE EDUCATION/TRAINING PROGRAM

## 2025-01-24 PROCEDURE — 63710000001 DIGOXIN 125 MCG TABLET: Performed by: STUDENT IN AN ORGANIZED HEALTH CARE EDUCATION/TRAINING PROGRAM

## 2025-01-24 PROCEDURE — 97602 WOUND(S) CARE NON-SELECTIVE: CPT

## 2025-01-24 PROCEDURE — 63710000001 APIXABAN 5 MG TABLET: Performed by: STUDENT IN AN ORGANIZED HEALTH CARE EDUCATION/TRAINING PROGRAM

## 2025-01-24 PROCEDURE — 63710000001 ALUMINUM-MAGNESIUM HYDROXIDE-SIMETHICONE 400-400-40 MG/5ML SUSPENSION: Performed by: PHYSICIAN ASSISTANT

## 2025-01-24 PROCEDURE — A9270 NON-COVERED ITEM OR SERVICE: HCPCS | Performed by: PHYSICIAN ASSISTANT

## 2025-01-24 PROCEDURE — 83735 ASSAY OF MAGNESIUM: CPT | Performed by: STUDENT IN AN ORGANIZED HEALTH CARE EDUCATION/TRAINING PROGRAM

## 2025-01-24 PROCEDURE — 63710000001 BUPROPION XL 150 MG TABLET SUSTAINED-RELEASE 24 HOUR: Performed by: STUDENT IN AN ORGANIZED HEALTH CARE EDUCATION/TRAINING PROGRAM

## 2025-01-24 PROCEDURE — 63710000001 FIRST MOUTHWASH (MAGIC MOUTHWASH) SUSPENSION 237 ML BOTTLE: Performed by: STUDENT IN AN ORGANIZED HEALTH CARE EDUCATION/TRAINING PROGRAM

## 2025-01-24 PROCEDURE — 63710000001 ATORVASTATIN 10 MG TABLET: Performed by: STUDENT IN AN ORGANIZED HEALTH CARE EDUCATION/TRAINING PROGRAM

## 2025-01-24 PROCEDURE — 80048 BASIC METABOLIC PNL TOTAL CA: CPT | Performed by: STUDENT IN AN ORGANIZED HEALTH CARE EDUCATION/TRAINING PROGRAM

## 2025-01-24 PROCEDURE — 82948 REAGENT STRIP/BLOOD GLUCOSE: CPT

## 2025-01-24 PROCEDURE — 63710000001 MAGNESIUM OXIDE -MG SUPPLEMENT 400 (240 MG) MG TABLET

## 2025-01-24 PROCEDURE — 84100 ASSAY OF PHOSPHORUS: CPT | Performed by: STUDENT IN AN ORGANIZED HEALTH CARE EDUCATION/TRAINING PROGRAM

## 2025-01-24 PROCEDURE — 85025 COMPLETE CBC W/AUTO DIFF WBC: CPT | Performed by: STUDENT IN AN ORGANIZED HEALTH CARE EDUCATION/TRAINING PROGRAM

## 2025-01-24 PROCEDURE — 85007 BL SMEAR W/DIFF WBC COUNT: CPT | Performed by: STUDENT IN AN ORGANIZED HEALTH CARE EDUCATION/TRAINING PROGRAM

## 2025-01-24 PROCEDURE — 63710000001 BUMETANIDE 1 MG TABLET: Performed by: STUDENT IN AN ORGANIZED HEALTH CARE EDUCATION/TRAINING PROGRAM

## 2025-01-24 PROCEDURE — 63710000001 ONDANSETRON ODT 4 MG TABLET DISPERSIBLE: Performed by: PHYSICIAN ASSISTANT

## 2025-01-24 PROCEDURE — 63710000001 HYDROCODONE-ACETAMINOPHEN 5-325 MG TABLET: Performed by: STUDENT IN AN ORGANIZED HEALTH CARE EDUCATION/TRAINING PROGRAM

## 2025-01-24 PROCEDURE — 63710000001 SERTRALINE 50 MG TABLET: Performed by: STUDENT IN AN ORGANIZED HEALTH CARE EDUCATION/TRAINING PROGRAM

## 2025-01-24 PROCEDURE — 63710000001 INSULIN GLARGINE PER 5 UNITS: Performed by: STUDENT IN AN ORGANIZED HEALTH CARE EDUCATION/TRAINING PROGRAM

## 2025-01-24 PROCEDURE — A9270 NON-COVERED ITEM OR SERVICE: HCPCS

## 2025-01-24 PROCEDURE — 63710000001 BACLOFEN 10 MG TABLET: Performed by: STUDENT IN AN ORGANIZED HEALTH CARE EDUCATION/TRAINING PROGRAM

## 2025-01-24 PROCEDURE — 63710000001 INSULIN LISPRO (HUMAN) PER 5 UNITS: Performed by: STUDENT IN AN ORGANIZED HEALTH CARE EDUCATION/TRAINING PROGRAM

## 2025-01-24 RX ORDER — ALUMINA, MAGNESIA, AND SIMETHICONE 2400; 2400; 240 MG/30ML; MG/30ML; MG/30ML
15 SUSPENSION ORAL EVERY 6 HOURS PRN
Status: DISCONTINUED | OUTPATIENT
Start: 2025-01-24 | End: 2025-01-30 | Stop reason: HOSPADM

## 2025-01-24 RX ORDER — ONDANSETRON 4 MG/1
4 TABLET, ORALLY DISINTEGRATING ORAL EVERY 6 HOURS PRN
Status: DISCONTINUED | OUTPATIENT
Start: 2025-01-24 | End: 2025-01-30 | Stop reason: HOSPADM

## 2025-01-24 RX ORDER — DIPHENHYDRAMINE HYDROCHLORIDE AND LIDOCAINE HYDROCHLORIDE AND ALUMINUM HYDROXIDE AND MAGNESIUM HYDRO
10 KIT EVERY 6 HOURS
Status: DISCONTINUED | OUTPATIENT
Start: 2025-01-24 | End: 2025-01-27

## 2025-01-24 RX ADMIN — HYDROCODONE BITARTRATE AND ACETAMINOPHEN 1 TABLET: 5; 325 TABLET ORAL at 22:37

## 2025-01-24 RX ADMIN — INSULIN LISPRO 2 UNITS: 100 INJECTION, SOLUTION INTRAVENOUS; SUBCUTANEOUS at 12:38

## 2025-01-24 RX ADMIN — HYDROCODONE BITARTRATE AND ACETAMINOPHEN 1 TABLET: 5; 325 TABLET ORAL at 10:42

## 2025-01-24 RX ADMIN — HYDROCODONE BITARTRATE AND ACETAMINOPHEN 1 TABLET: 5; 325 TABLET ORAL at 02:08

## 2025-01-24 RX ADMIN — BUMETANIDE 1 MG: 1 TABLET ORAL at 08:16

## 2025-01-24 RX ADMIN — BACLOFEN 10 MG: 10 TABLET ORAL at 17:16

## 2025-01-24 RX ADMIN — BUPROPION HYDROCHLORIDE 150 MG: 150 TABLET, EXTENDED RELEASE ORAL at 08:16

## 2025-01-24 RX ADMIN — INSULIN GLARGINE 10 UNITS: 100 INJECTION, SOLUTION SUBCUTANEOUS at 08:16

## 2025-01-24 RX ADMIN — DIPHENHYDRAMINE HYDROCHLORIDE AND LIDOCAINE HYDROCHLORIDE AND ALUMINUM HYDROXIDE AND MAGNESIUM HYDRO 10 ML: KIT at 17:16

## 2025-01-24 RX ADMIN — ALUMINUM HYDROXIDE, MAGNESIUM HYDROXIDE, AND DIMETHICONE 15 ML: 400; 400; 40 SUSPENSION ORAL at 21:55

## 2025-01-24 RX ADMIN — ATORVASTATIN CALCIUM 10 MG: 10 TABLET, FILM COATED ORAL at 20:10

## 2025-01-24 RX ADMIN — Medication 10 ML: at 20:13

## 2025-01-24 RX ADMIN — APIXABAN 5 MG: 5 TABLET, FILM COATED ORAL at 08:16

## 2025-01-24 RX ADMIN — INSULIN LISPRO 4 UNITS: 100 INJECTION, SOLUTION INTRAVENOUS; SUBCUTANEOUS at 20:10

## 2025-01-24 RX ADMIN — DIPHENHYDRAMINE HYDROCHLORIDE AND LIDOCAINE HYDROCHLORIDE AND ALUMINUM HYDROXIDE AND MAGNESIUM HYDRO 10 ML: KIT at 11:55

## 2025-01-24 RX ADMIN — ONDANSETRON 4 MG: 4 TABLET, ORALLY DISINTEGRATING ORAL at 23:13

## 2025-01-24 RX ADMIN — INSULIN LISPRO 4 UNITS: 100 INJECTION, SOLUTION INTRAVENOUS; SUBCUTANEOUS at 18:02

## 2025-01-24 RX ADMIN — APIXABAN 5 MG: 5 TABLET, FILM COATED ORAL at 20:10

## 2025-01-24 RX ADMIN — FERROUS SULFATE TAB 325 MG (65 MG ELEMENTAL FE) 325 MG: 325 (65 FE) TAB at 08:16

## 2025-01-24 RX ADMIN — DIGOXIN 125 MCG: 125 TABLET ORAL at 11:55

## 2025-01-24 RX ADMIN — Medication 10 ML: at 08:16

## 2025-01-24 RX ADMIN — Medication 400 MG: at 08:16

## 2025-01-24 RX ADMIN — SERTRALINE HYDROCHLORIDE 50 MG: 50 TABLET ORAL at 20:10

## 2025-01-24 RX ADMIN — BACLOFEN 10 MG: 10 TABLET ORAL at 08:16

## 2025-01-24 NOTE — PLAN OF CARE
Goal Outcome Evaluation:              Outcome Evaluation: AAOx4, VSS. Patient seems to have accepted having to go to LTC in [place].

## 2025-01-24 NOTE — PLAN OF CARE
Problem: Adult Inpatient Plan of Care  Goal: Plan of Care Review  Outcome: Progressing  Flowsheets (Taken 1/24/2025 1654)  Progress: no change  Outcome Evaluation: Patient is alert and oriented x4. Patient has had complaints of pain, see EMAR. Patient has no complaints at this time.  Plan of Care Reviewed With: patient  Goal: Patient-Specific Goal (Individualized)  Outcome: Progressing  Goal: Absence of Hospital-Acquired Illness or Injury  Outcome: Progressing  Intervention: Identify and Manage Fall Risk  Recent Flowsheet Documentation  Taken 1/24/2025 1239 by Mae Francisco, RN  Safety Promotion/Fall Prevention:   assistive device/personal items within reach   clutter free environment maintained   fall prevention program maintained   lighting adjusted   nonskid shoes/slippers when out of bed   room organization consistent   safety round/check completed  Taken 1/24/2025 1156 by Mae Francisco, RN  Safety Promotion/Fall Prevention:   assistive device/personal items within reach   clutter free environment maintained   fall prevention program maintained   lighting adjusted   nonskid shoes/slippers when out of bed   room organization consistent   safety round/check completed  Taken 1/24/2025 1042 by Mae Francisco, RN  Safety Promotion/Fall Prevention:   assistive device/personal items within reach   clutter free environment maintained   fall prevention program maintained   lighting adjusted   nonskid shoes/slippers when out of bed   room organization consistent   safety round/check completed  Taken 1/24/2025 1015 by Mae Francisco, RN  Safety Promotion/Fall Prevention:   assistive device/personal items within reach   clutter free environment maintained   fall prevention program maintained   lighting adjusted   nonskid shoes/slippers when out of bed   room organization consistent   safety round/check completed  Taken 1/24/2025 0816 by Mae Francisco, RN  Safety Promotion/Fall Prevention:   assistive  device/personal items within reach   clutter free environment maintained   fall prevention program maintained   lighting adjusted   nonskid shoes/slippers when out of bed   room organization consistent   safety round/check completed  Taken 1/24/2025 0713 by Mae Francisco RN  Safety Promotion/Fall Prevention:   assistive device/personal items within reach   clutter free environment maintained   fall prevention program maintained   lighting adjusted   nonskid shoes/slippers when out of bed   room organization consistent   safety round/check completed  Intervention: Prevent Infection  Recent Flowsheet Documentation  Taken 1/24/2025 1239 by Mae Francisco RN  Infection Prevention:   cohorting utilized   environmental surveillance performed   equipment surfaces disinfected   hand hygiene promoted   personal protective equipment utilized   rest/sleep promoted   single patient room provided  Taken 1/24/2025 1156 by Mae Francisco RN  Infection Prevention:   cohorting utilized   environmental surveillance performed   equipment surfaces disinfected   hand hygiene promoted   personal protective equipment utilized   rest/sleep promoted   single patient room provided  Taken 1/24/2025 1042 by Mae Francisco RN  Infection Prevention:   cohorting utilized   environmental surveillance performed   equipment surfaces disinfected   hand hygiene promoted   personal protective equipment utilized   rest/sleep promoted   single patient room provided  Taken 1/24/2025 1015 by Mae Francisco RN  Infection Prevention:   cohorting utilized   environmental surveillance performed   equipment surfaces disinfected   hand hygiene promoted   personal protective equipment utilized   rest/sleep promoted   single patient room provided  Taken 1/24/2025 0816 by Mae Francisco RN  Infection Prevention:   cohorting utilized   environmental surveillance performed   equipment surfaces disinfected   hand hygiene promoted   personal protective  equipment utilized   rest/sleep promoted   single patient room provided  Taken 1/24/2025 0713 by Mae Francisco RN  Infection Prevention:   cohorting utilized   environmental surveillance performed   equipment surfaces disinfected   hand hygiene promoted   personal protective equipment utilized   rest/sleep promoted   single patient room provided  Goal: Optimal Comfort and Wellbeing  Outcome: Progressing  Intervention: Monitor Pain and Promote Comfort  Recent Flowsheet Documentation  Taken 1/24/2025 1042 by Mae Francisco RN  Pain Management Interventions:   care clustered   pain management plan reviewed with patient/caregiver   pain medication given   quiet environment facilitated  Taken 1/24/2025 0816 by Mae Francisco RN  Pain Management Interventions:   care clustered   pain management plan reviewed with patient/caregiver   pain medication given   quiet environment facilitated  Intervention: Provide Person-Centered Care  Recent Flowsheet Documentation  Taken 1/24/2025 0816 by Mae Francisco RN  Trust Relationship/Rapport:   care explained   choices provided   emotional support provided   empathic listening provided   questions encouraged   questions answered   reassurance provided   thoughts/feelings acknowledged  Goal: Readiness for Transition of Care  Outcome: Progressing     Problem: Fall Injury Risk  Goal: Absence of Fall and Fall-Related Injury  Outcome: Progressing  Intervention: Identify and Manage Contributors  Recent Flowsheet Documentation  Taken 1/24/2025 0816 by Mae Francisco RN  Medication Review/Management:   medications reviewed   high-risk medications identified  Intervention: Promote Injury-Free Environment  Recent Flowsheet Documentation  Taken 1/24/2025 1239 by Mae Francisco RN  Safety Promotion/Fall Prevention:   assistive device/personal items within reach   clutter free environment maintained   fall prevention program maintained   lighting adjusted   nonskid shoes/slippers  when out of bed   room organization consistent   safety round/check completed  Taken 1/24/2025 1156 by Mae Francisco, RN  Safety Promotion/Fall Prevention:   assistive device/personal items within reach   clutter free environment maintained   fall prevention program maintained   lighting adjusted   nonskid shoes/slippers when out of bed   room organization consistent   safety round/check completed  Taken 1/24/2025 1042 by Mae Francisco, RN  Safety Promotion/Fall Prevention:   assistive device/personal items within reach   clutter free environment maintained   fall prevention program maintained   lighting adjusted   nonskid shoes/slippers when out of bed   room organization consistent   safety round/check completed  Taken 1/24/2025 1015 by Mae Francisco, RN  Safety Promotion/Fall Prevention:   assistive device/personal items within reach   clutter free environment maintained   fall prevention program maintained   lighting adjusted   nonskid shoes/slippers when out of bed   room organization consistent   safety round/check completed  Taken 1/24/2025 0816 by Mae Francisco, RN  Safety Promotion/Fall Prevention:   assistive device/personal items within reach   clutter free environment maintained   fall prevention program maintained   lighting adjusted   nonskid shoes/slippers when out of bed   room organization consistent   safety round/check completed  Taken 1/24/2025 0713 by Mae Francisco, RN  Safety Promotion/Fall Prevention:   assistive device/personal items within reach   clutter free environment maintained   fall prevention program maintained   lighting adjusted   nonskid shoes/slippers when out of bed   room organization consistent   safety round/check completed     Problem: Wound  Goal: Optimal Coping  Outcome: Progressing  Goal: Optimal Functional Ability  Outcome: Progressing  Goal: Absence of Infection Signs and Symptoms  Outcome: Progressing  Goal: Improved Oral Intake  Outcome: Progressing  Goal:  Optimal Pain Control and Function  Outcome: Progressing  Intervention: Prevent or Manage Pain  Recent Flowsheet Documentation  Taken 1/24/2025 1042 by Mae Francisco RN  Pain Management Interventions:   care clustered   pain management plan reviewed with patient/caregiver   pain medication given   quiet environment facilitated  Taken 1/24/2025 0816 by Mae Francisco RN  Pain Management Interventions:   care clustered   pain management plan reviewed with patient/caregiver   pain medication given   quiet environment facilitated  Goal: Skin Health and Integrity  Outcome: Progressing  Goal: Optimal Wound Healing  Outcome: Progressing     Problem: Skin Injury Risk Increased  Goal: Skin Health and Integrity  Outcome: Progressing     Problem: Pain Acute  Goal: Optimal Pain Control and Function  Outcome: Progressing  Intervention: Develop Pain Management Plan  Recent Flowsheet Documentation  Taken 1/24/2025 1042 by Mae Francisco RN  Pain Management Interventions:   care clustered   pain management plan reviewed with patient/caregiver   pain medication given   quiet environment facilitated  Taken 1/24/2025 0816 by Mae Francisco RN  Pain Management Interventions:   care clustered   pain management plan reviewed with patient/caregiver   pain medication given   quiet environment facilitated  Intervention: Prevent or Manage Pain  Recent Flowsheet Documentation  Taken 1/24/2025 0816 by Mae Francisco RN  Medication Review/Management:   medications reviewed   high-risk medications identified   Goal Outcome Evaluation:  Plan of Care Reviewed With: patient        Progress: no change  Outcome Evaluation: Patient is alert and oriented x4. Patient has had complaints of pain, see EMAR. Patient has no complaints at this time. Pt refused dressing change to shoulder. BLE wrapped with kerlix and ACE wrap this shift.

## 2025-01-24 NOTE — PROGRESS NOTES
[facility]   Hospitalist Progress Note  Date: 2025  Patient Name: Jose Shaikh  : 1960  MRN: [id number]  Date of admission: 2025  Room/Bed: 3021/1      Subjective   Subjective     Chief Complaint: unable to take care of self     Summary:Jose Shaikh is a 64 y.o. male  with history of chronic diastolic congestive heart failure, paroxysmal A-fib on Eliquis, type 2 diabetes, morbid obesity, debility with wheelchair dependence, osteoarthritis of hip, chronic venous stasis, deconditioning, hemorrhoids and anasarca who presented to ER after not being able to take care of himself at home.  Patient reported that he had been laying in his own feces/urine for last several days.  He lives alone at home and came to the ER for cleanup.  His potassium was low when he presented to ER which was repleted.  Vital stable.  ER  was involved as patient is not interested in going to nursing home in [place].   contacted [facility] nursing and rehab; patient agreeable to start the referral.  Admitted for pending discharge to nursing home.  No other active issues.  Patient was laying in bed during my evaluation and was getting clean.  No active issues.  Wound care on board. Pending disposition.    Interval Followup: No acute events overnight. Pain better with as needed Norco for his chronic hip pain. No other active complaints. C diff negative; only showed colonization.    Review of Systems    All systems reviewed and negative except for what is outlined above.      Objective   Objective     Vitals:   Temp:  [97.3 °F (36.3 °C)-98.2 °F (36.8 °C)] 97.8 °F (36.6 °C)  Heart Rate:  [] 90  Resp:  [20-22] 20  BP: (110-130)/(40-68) 128/67    Physical Exam   General: Awake, alert, NAD  Cardiovascular: RRR, no murmurs   Pulmonary: CTA bilaterally; no wheezes; no conversational dyspnea  Gastrointestinal: S/ND/NT, +BS  Neuro: alert, awake, oriented x 3; speech clear; no tremor      Result  Review    Result Review:  I have personally reviewed these results:  [x]  Laboratory      Lab 01/24/25  0550 01/23/25  0605 01/21/25  1707   WBC 4.75 5.45 8.26   HEMOGLOBIN 10.7* 11.3* 13.0   HEMATOCRIT 35.6* 37.2* 44.1   PLATELETS 157 157 221   NEUTROS ABS 2.72 3.33 5.95   IMMATURE GRANS (ABS) 0.02 0.01 0.03   LYMPHS ABS 1.22 1.28 1.54   MONOS ABS 0.57 0.69 0.60   EOS ABS 0.14 0.08 0.06   MCV 70.4* 69.7* 71.4*         Lab 01/24/25  0550 01/23/25  0605 01/21/25  1707   SODIUM 131* 129* 135*   POTASSIUM 3.7 3.4* 3.2*   CHLORIDE 98 96* 94*   CO2 24.9 24.4 26.8   ANION GAP 8.1 8.6 14.2   BUN 9 11 6*   CREATININE 0.36* 0.50* 0.59*   EGFR 124.2 112.5 107.0   GLUCOSE 136* 212* 177*   CALCIUM 7.9* 8.0* 8.4*   MAGNESIUM 1.7 1.6  --    PHOSPHORUS 2.8 2.6  --          Lab 01/23/25  0605 01/21/25  1707   TOTAL PROTEIN 5.7* 7.0   ALBUMIN 2.6* 3.1*   GLOBULIN  --  3.9   ALT (SGPT) 19 22   AST (SGOT) 43* 42*   BILIRUBIN 1.0 1.4*   INDIRECT BILIRUBIN 0.6  --    BILIRUBIN DIRECT 0.4*  --    ALK PHOS 215* 225*                     Brief Urine Lab Results  (Last result in the past 365 days)        Color   Clarity   Blood   Leuk Est   Nitrite   Protein   CREAT   Urine HCG        01/21/25 1703 Dark Yellow   Clear   Negative   Small (1+)   Negative   Negative                 [x]  Microbiology   Microbiology Results (last 10 days)       Procedure Component Value - Date/Time    Clostridioides difficile Toxin - Stool, Per Rectum [[id number]]  (Abnormal) Collected: 01/22/25 1517    Lab Status: Final result Specimen: Stool from Per Rectum Updated: 01/22/25 1621    Narrative:      The following orders were created for panel order Clostridioides difficile Toxin - Stool, Per Rectum.  Procedure                               Abnormality         Status                     ---------                               -----------         ------                     Clostridioides difficile...[[id number]]  Abnormal            Final result                  Please view results for these tests on the individual orders.    Clostridioides difficile Toxin, PCR - Stool, Per Rectum [[id number]]  (Abnormal) Collected: 01/22/25 1517    Lab Status: Final result Specimen: Stool from Per Rectum Updated: 01/22/25 1621     Toxigenic C. difficile by PCR Positive     027 Toxin Presumptive Negative    Narrative:      DNA from a toxigenic strain of C.difficile has been detected. Antigen testing for the presence of free C.difficile toxin is currently in progress, to help determine the clinical significance of this PCR result.     Clostridioides difficile toxin Ag, Reflex - Stool, Per Rectum [[id number]]  (Normal) Collected: 01/22/25 1517    Lab Status: Final result Specimen: Stool from Per Rectum Updated: 01/22/25 1645     C.diff Toxin Ag Negative    Narrative:      DNA from a toxigenic strain of C.difficile was detected, although the free toxin itself was not detected. These findings are consistent with C.difficile colonization and may not reflect actual C.difficile infection. Clinical correlation needed.    Urine Culture - Urine, Urine, Clean Catch [[id number]] Collected: 01/21/25 1703    Lab Status: Final result Specimen: Urine, Clean Catch Updated: 01/22/25 1141     Urine Culture 25,000 CFU/mL Normal Urogenital Berenice    Narrative:      Colonization of the urinary tract without infection is common. Treatment is discouraged unless the patient is symptomatic, pregnant, or undergoing an invasive urologic procedure.          [x]  Radiology  XR Chest 1 View    Result Date: 1/21/2025  Impression: An acute pulmonary process is not apparent. Electronically Signed: Jorge Urban MD  1/21/2025 4:22 PM EST  Workstation ID: [id number]   []  EKG/Telemetry   []  Cardiology/Vascular   []  Pathology  []  Old records  []  Other:    Assessment & Plan   Assessment / Plan     Assessment:  Unable to take care of himself at home  History of chronic diastolic congestive heart failure  paroxysmal A-fib on  Eliquis  Type 2 diabetes  Morbid obesity  Debility with wheelchair dependence  Osteoarthritis of hip  Chronic venous stasis  Deconditioning  Hemorrhoids   Anasarca    Plan:  Patient is being managed in outpatient bed.  Presented after not being able to take care of himself and had been laying in feces/urine for past 3 days.  Patient was cleaned and  working on his disposition.  Referral sent to [facility] and rehab at [place].  Pending disposition.  Home medication reconciled and restarted.    Found to have C diff colonization; negative for toxin. Has history of C. difficile colonization.  No leukocytosis and renal function stable.   On as needed baclofen for muscle spasm.  Started on PRN Norco for pain.  Continue rest of current management.  Lab work in AM.  Patient to be discharged home if he refuses rehab placement.  Pending disposition.     Discussed with RN.    VTE Prophylaxis:  Pharmacologic VTE prophylaxis orders are present.        CODE STATUS:   Code Status (Patient has no pulse and is not breathing): CPR (Attempt to Resuscitate)  Medical Interventions (Patient has pulse or is breathing): Full Support      Electronically signed by Janis Lam MD, 01/24/25, 8:21 AM EST.

## 2025-01-25 LAB
ANION GAP SERPL CALCULATED.3IONS-SCNC: 8.4 MMOL/L (ref 5–15)
BASOPHILS # BLD AUTO: 0.05 10*3/MM3 (ref 0–0.2)
BASOPHILS NFR BLD AUTO: 1.2 % (ref 0–1.5)
BUN SERPL-MCNC: 10 MG/DL (ref 8–23)
BUN/CREAT SERPL: 22.7 (ref 7–25)
CALCIUM SPEC-SCNC: 8 MG/DL (ref 8.6–10.5)
CHLORIDE SERPL-SCNC: 98 MMOL/L (ref 98–107)
CO2 SERPL-SCNC: 25.6 MMOL/L (ref 22–29)
CREAT SERPL-MCNC: 0.44 MG/DL (ref 0.76–1.27)
DEPRECATED RDW RBC AUTO: 60.2 FL (ref 37–54)
EGFRCR SERPLBLD CKD-EPI 2021: 116.9 ML/MIN/1.73
EOSINOPHIL # BLD AUTO: 0.1 10*3/MM3 (ref 0–0.4)
EOSINOPHIL NFR BLD AUTO: 2.3 % (ref 0.3–6.2)
ERYTHROCYTE [DISTWIDTH] IN BLOOD BY AUTOMATED COUNT: 24.6 % (ref 12.3–15.4)
GLUCOSE BLDC GLUCOMTR-MCNC: 183 MG/DL (ref 70–99)
GLUCOSE BLDC GLUCOMTR-MCNC: 190 MG/DL (ref 70–99)
GLUCOSE BLDC GLUCOMTR-MCNC: 244 MG/DL (ref 70–99)
GLUCOSE BLDC GLUCOMTR-MCNC: 262 MG/DL (ref 70–99)
GLUCOSE SERPL-MCNC: 224 MG/DL (ref 65–99)
HCT VFR BLD AUTO: 33.8 % (ref 37.5–51)
HGB BLD-MCNC: 10.2 G/DL (ref 13–17.7)
IMM GRANULOCYTES # BLD AUTO: 0.02 10*3/MM3 (ref 0–0.05)
IMM GRANULOCYTES NFR BLD AUTO: 0.5 % (ref 0–0.5)
LYMPHOCYTES # BLD AUTO: 1.01 10*3/MM3 (ref 0.7–3.1)
LYMPHOCYTES NFR BLD AUTO: 23.5 % (ref 19.6–45.3)
MAGNESIUM SERPL-MCNC: 1.8 MG/DL (ref 1.6–2.4)
MCH RBC QN AUTO: 21.2 PG (ref 26.6–33)
MCHC RBC AUTO-ENTMCNC: 30.2 G/DL (ref 31.5–35.7)
MCV RBC AUTO: 70.1 FL (ref 79–97)
MONOCYTES # BLD AUTO: 0.51 10*3/MM3 (ref 0.1–0.9)
MONOCYTES NFR BLD AUTO: 11.9 % (ref 5–12)
NEUTROPHILS NFR BLD AUTO: 2.6 10*3/MM3 (ref 1.7–7)
NEUTROPHILS NFR BLD AUTO: 60.6 % (ref 42.7–76)
NRBC BLD AUTO-RTO: 0 /100 WBC (ref 0–0.2)
PHOSPHATE SERPL-MCNC: 2.1 MG/DL (ref 2.5–4.5)
PLATELET # BLD AUTO: 125 10*3/MM3 (ref 140–450)
PMV BLD AUTO: ABNORMAL FL
POTASSIUM SERPL-SCNC: 3.6 MMOL/L (ref 3.5–5.2)
RBC # BLD AUTO: 4.82 10*6/MM3 (ref 4.14–5.8)
SODIUM SERPL-SCNC: 132 MMOL/L (ref 136–145)
WBC NRBC COR # BLD AUTO: 4.29 10*3/MM3 (ref 3.4–10.8)

## 2025-01-25 PROCEDURE — 63710000001 ATORVASTATIN 10 MG TABLET: Performed by: STUDENT IN AN ORGANIZED HEALTH CARE EDUCATION/TRAINING PROGRAM

## 2025-01-25 PROCEDURE — A9270 NON-COVERED ITEM OR SERVICE: HCPCS | Performed by: STUDENT IN AN ORGANIZED HEALTH CARE EDUCATION/TRAINING PROGRAM

## 2025-01-25 PROCEDURE — 63710000001 SERTRALINE 50 MG TABLET: Performed by: STUDENT IN AN ORGANIZED HEALTH CARE EDUCATION/TRAINING PROGRAM

## 2025-01-25 PROCEDURE — 96366 THER/PROPH/DIAG IV INF ADDON: CPT

## 2025-01-25 PROCEDURE — 63710000001 FERROUS SULFATE 325 (65 FE) MG TABLET: Performed by: STUDENT IN AN ORGANIZED HEALTH CARE EDUCATION/TRAINING PROGRAM

## 2025-01-25 PROCEDURE — 84100 ASSAY OF PHOSPHORUS: CPT | Performed by: STUDENT IN AN ORGANIZED HEALTH CARE EDUCATION/TRAINING PROGRAM

## 2025-01-25 PROCEDURE — A9270 NON-COVERED ITEM OR SERVICE: HCPCS

## 2025-01-25 PROCEDURE — 80048 BASIC METABOLIC PNL TOTAL CA: CPT | Performed by: STUDENT IN AN ORGANIZED HEALTH CARE EDUCATION/TRAINING PROGRAM

## 2025-01-25 PROCEDURE — 63710000001 HYDROCODONE-ACETAMINOPHEN 5-325 MG TABLET: Performed by: STUDENT IN AN ORGANIZED HEALTH CARE EDUCATION/TRAINING PROGRAM

## 2025-01-25 PROCEDURE — 97602 WOUND(S) CARE NON-SELECTIVE: CPT

## 2025-01-25 PROCEDURE — 82948 REAGENT STRIP/BLOOD GLUCOSE: CPT

## 2025-01-25 PROCEDURE — 83735 ASSAY OF MAGNESIUM: CPT | Performed by: STUDENT IN AN ORGANIZED HEALTH CARE EDUCATION/TRAINING PROGRAM

## 2025-01-25 PROCEDURE — 63710000001 INSULIN GLARGINE PER 5 UNITS: Performed by: STUDENT IN AN ORGANIZED HEALTH CARE EDUCATION/TRAINING PROGRAM

## 2025-01-25 PROCEDURE — 63710000001 APIXABAN 5 MG TABLET: Performed by: STUDENT IN AN ORGANIZED HEALTH CARE EDUCATION/TRAINING PROGRAM

## 2025-01-25 PROCEDURE — 63710000001 MAGNESIUM OXIDE -MG SUPPLEMENT 400 (240 MG) MG TABLET

## 2025-01-25 PROCEDURE — 63710000001 DIGOXIN 125 MCG TABLET: Performed by: STUDENT IN AN ORGANIZED HEALTH CARE EDUCATION/TRAINING PROGRAM

## 2025-01-25 PROCEDURE — 36415 COLL VENOUS BLD VENIPUNCTURE: CPT | Performed by: STUDENT IN AN ORGANIZED HEALTH CARE EDUCATION/TRAINING PROGRAM

## 2025-01-25 PROCEDURE — 82948 REAGENT STRIP/BLOOD GLUCOSE: CPT | Performed by: STUDENT IN AN ORGANIZED HEALTH CARE EDUCATION/TRAINING PROGRAM

## 2025-01-25 PROCEDURE — 63710000001 INSULIN LISPRO (HUMAN) PER 5 UNITS: Performed by: STUDENT IN AN ORGANIZED HEALTH CARE EDUCATION/TRAINING PROGRAM

## 2025-01-25 PROCEDURE — 99213 OFFICE O/P EST LOW 20 MIN: CPT | Performed by: STUDENT IN AN ORGANIZED HEALTH CARE EDUCATION/TRAINING PROGRAM

## 2025-01-25 PROCEDURE — 85025 COMPLETE CBC W/AUTO DIFF WBC: CPT | Performed by: STUDENT IN AN ORGANIZED HEALTH CARE EDUCATION/TRAINING PROGRAM

## 2025-01-25 PROCEDURE — 96365 THER/PROPH/DIAG IV INF INIT: CPT

## 2025-01-25 PROCEDURE — 63710000001: Performed by: STUDENT IN AN ORGANIZED HEALTH CARE EDUCATION/TRAINING PROGRAM

## 2025-01-25 PROCEDURE — 63710000001 BUMETANIDE 1 MG TABLET: Performed by: STUDENT IN AN ORGANIZED HEALTH CARE EDUCATION/TRAINING PROGRAM

## 2025-01-25 PROCEDURE — 25010000003 DEXTROSE 5 % SOLUTION: Performed by: STUDENT IN AN ORGANIZED HEALTH CARE EDUCATION/TRAINING PROGRAM

## 2025-01-25 PROCEDURE — 63710000001 BUPROPION XL 150 MG TABLET SUSTAINED-RELEASE 24 HOUR: Performed by: STUDENT IN AN ORGANIZED HEALTH CARE EDUCATION/TRAINING PROGRAM

## 2025-01-25 PROCEDURE — 63710000001 BACLOFEN 10 MG TABLET: Performed by: STUDENT IN AN ORGANIZED HEALTH CARE EDUCATION/TRAINING PROGRAM

## 2025-01-25 RX ORDER — HYDROCODONE BITARTRATE AND ACETAMINOPHEN 5; 325 MG/1; MG/1
1 TABLET ORAL EVERY 6 HOURS PRN
Status: DISCONTINUED | OUTPATIENT
Start: 2025-01-25 | End: 2025-01-30 | Stop reason: HOSPADM

## 2025-01-25 RX ADMIN — FERROUS SULFATE TAB 325 MG (65 MG ELEMENTAL FE) 325 MG: 325 (65 FE) TAB at 07:57

## 2025-01-25 RX ADMIN — APIXABAN 5 MG: 5 TABLET, FILM COATED ORAL at 20:50

## 2025-01-25 RX ADMIN — INSULIN LISPRO 2 UNITS: 100 INJECTION, SOLUTION INTRAVENOUS; SUBCUTANEOUS at 08:49

## 2025-01-25 RX ADMIN — HYDROCODONE BITARTRATE AND ACETAMINOPHEN 1 TABLET: 5; 325 TABLET ORAL at 14:38

## 2025-01-25 RX ADMIN — SODIUM PHOSPHATE, MONOBASIC, MONOHYDRATE AND SODIUM PHOSPHATE, DIBASIC, ANHYDROUS 15 MMOL: 142; 276 INJECTION, SOLUTION INTRAVENOUS at 12:18

## 2025-01-25 RX ADMIN — INSULIN LISPRO 2 UNITS: 100 INJECTION, SOLUTION INTRAVENOUS; SUBCUTANEOUS at 18:04

## 2025-01-25 RX ADMIN — ATORVASTATIN CALCIUM 10 MG: 10 TABLET, FILM COATED ORAL at 20:50

## 2025-01-25 RX ADMIN — BUPROPION HYDROCHLORIDE 150 MG: 150 TABLET, EXTENDED RELEASE ORAL at 07:57

## 2025-01-25 RX ADMIN — SODIUM PHOSPHATE, MONOBASIC, MONOHYDRATE AND SODIUM PHOSPHATE, DIBASIC, ANHYDROUS 15 MMOL: 142; 276 INJECTION, SOLUTION INTRAVENOUS at 08:49

## 2025-01-25 RX ADMIN — HYDROCODONE BITARTRATE AND ACETAMINOPHEN 1 TABLET: 5; 325 TABLET ORAL at 07:57

## 2025-01-25 RX ADMIN — APIXABAN 5 MG: 5 TABLET, FILM COATED ORAL at 07:57

## 2025-01-25 RX ADMIN — BACLOFEN 10 MG: 10 TABLET ORAL at 20:50

## 2025-01-25 RX ADMIN — DIPHENHYDRAMINE HYDROCHLORIDE AND LIDOCAINE HYDROCHLORIDE AND ALUMINUM HYDROXIDE AND MAGNESIUM HYDRO 10 ML: KIT at 22:11

## 2025-01-25 RX ADMIN — INSULIN LISPRO 6 UNITS: 100 INJECTION, SOLUTION INTRAVENOUS; SUBCUTANEOUS at 20:49

## 2025-01-25 RX ADMIN — BUMETANIDE 1 MG: 1 TABLET ORAL at 07:57

## 2025-01-25 RX ADMIN — Medication 10 ML: at 20:50

## 2025-01-25 RX ADMIN — Medication 400 MG: at 07:57

## 2025-01-25 RX ADMIN — Medication 10 ML: at 08:49

## 2025-01-25 RX ADMIN — BENZOCAINE 1 APPLICATION: 200 GEL DENTAL; ORAL; PERIODONTAL at 18:05

## 2025-01-25 RX ADMIN — INSULIN LISPRO 4 UNITS: 100 INJECTION, SOLUTION INTRAVENOUS; SUBCUTANEOUS at 12:43

## 2025-01-25 RX ADMIN — SERTRALINE HYDROCHLORIDE 50 MG: 50 TABLET ORAL at 20:50

## 2025-01-25 RX ADMIN — DIGOXIN 125 MCG: 125 TABLET ORAL at 12:19

## 2025-01-25 RX ADMIN — BACLOFEN 10 MG: 10 TABLET ORAL at 12:18

## 2025-01-25 RX ADMIN — HYDROCODONE BITARTRATE AND ACETAMINOPHEN 1 TABLET: 5; 325 TABLET ORAL at 20:50

## 2025-01-25 RX ADMIN — BACLOFEN 10 MG: 10 TABLET ORAL at 03:01

## 2025-01-25 RX ADMIN — INSULIN GLARGINE 10 UNITS: 100 INJECTION, SOLUTION SUBCUTANEOUS at 07:58

## 2025-01-25 NOTE — PROGRESS NOTES
[facility]   Hospitalist Progress Note  Date: 2025  Patient Name: Jose Shaikh  : 1960  MRN: [id number]  Date of admission: 2025  Room/Bed: 3021/1      Subjective   Subjective     Chief Complaint: unable to take care of self     Summary:Jose Shaikh is a 64 y.o. male  with history of chronic diastolic congestive heart failure, paroxysmal A-fib on Eliquis, type 2 diabetes, morbid obesity, debility with wheelchair dependence, osteoarthritis of hip, chronic venous stasis, deconditioning, hemorrhoids and anasarca who presented to ER after not being able to take care of himself at home.  Patient reported that he had been laying in his own feces/urine for last several days.  He lives alone at home and came to the ER for cleanup.  His potassium was low when he presented to ER which was repleted.  Vital stable.  ER  was involved as patient is not interested in going to nursing home in [place].   contacted [facility] nursing and rehab; patient agreeable to start the referral.  Admitted for pending discharge to nursing home.  No other active issues.  Patient was laying in bed during my evaluation and was getting clean.  No active issues.  Wound care on board. Pending disposition.    Interval Followup: No acute events overnight.  As needed Norco frequency increased to every 6 hours.  Magic mouthwash is helping with gum pain.  Also started on Orajel as needed for mucositis.  No other active complaints.     Review of Systems    All systems reviewed and negative except for what is outlined above.      Objective   Objective     Vitals:   Temp:  [97.9 °F (36.6 °C)-98.6 °F (37 °C)] 98.1 °F (36.7 °C)  Heart Rate:  [87-94] 93  Resp:  [18] 18  BP: (113-134)/(60-83) 134/74    Physical Exam   General: Awake, alert, NAD  Cardiovascular: RRR, no murmurs   Pulmonary: CTA bilaterally; no wheezes; no conversational dyspnea  Gastrointestinal: S/ND/NT, +BS  Neuro: alert, awake, oriented x  3; speech clear; no tremor      Result Review    Result Review:  I have personally reviewed these results:  [x]  Laboratory      Lab 01/25/25  0613 01/24/25  0550 01/23/25  0605   WBC 4.29 4.75 5.45   HEMOGLOBIN 10.2* 10.7* 11.3*   HEMATOCRIT 33.8* 35.6* 37.2*   PLATELETS 125* 157 157   NEUTROS ABS 2.60 2.72 3.33   IMMATURE GRANS (ABS) 0.02 0.02 0.01   LYMPHS ABS 1.01 1.22 1.28   MONOS ABS 0.51 0.57 0.69   EOS ABS 0.10 0.14 0.08   MCV 70.1* 70.4* 69.7*         Lab 01/25/25  0613 01/24/25  0550 01/23/25  0605   SODIUM 132* 131* 129*   POTASSIUM 3.6 3.7 3.4*   CHLORIDE 98 98 96*   CO2 25.6 24.9 24.4   ANION GAP 8.4 8.1 8.6   BUN 10 9 11   CREATININE 0.44* 0.36* 0.50*   EGFR 116.9 124.2 112.5   GLUCOSE 224* 136* 212*   CALCIUM 8.0* 7.9* 8.0*   MAGNESIUM 1.8 1.7 1.6   PHOSPHORUS 2.1* 2.8 2.6         Lab 01/23/25  0605 01/21/25  1707   TOTAL PROTEIN 5.7* 7.0   ALBUMIN 2.6* 3.1*   GLOBULIN  --  3.9   ALT (SGPT) 19 22   AST (SGOT) 43* 42*   BILIRUBIN 1.0 1.4*   INDIRECT BILIRUBIN 0.6  --    BILIRUBIN DIRECT 0.4*  --    ALK PHOS 215* 225*                     Brief Urine Lab Results  (Last result in the past 365 days)        Color   Clarity   Blood   Leuk Est   Nitrite   Protein   CREAT   Urine HCG        01/21/25 1703 Dark Yellow   Clear   Negative   Small (1+)   Negative   Negative                 [x]  Microbiology   Microbiology Results (last 10 days)       Procedure Component Value - Date/Time    Clostridioides difficile Toxin - Stool, Per Rectum [[id number]]  (Abnormal) Collected: 01/22/25 1517    Lab Status: Final result Specimen: Stool from Per Rectum Updated: 01/22/25 1621    Narrative:      The following orders were created for panel order Clostridioides difficile Toxin - Stool, Per Rectum.  Procedure                               Abnormality         Status                     ---------                               -----------         ------                     Clostridioides difficile...[[id number]]  Abnormal             Final result                 Please view results for these tests on the individual orders.    Clostridioides difficile Toxin, PCR - Stool, Per Rectum [[id number]]  (Abnormal) Collected: 01/22/25 1517    Lab Status: Final result Specimen: Stool from Per Rectum Updated: 01/22/25 1621     Toxigenic C. difficile by PCR Positive     027 Toxin Presumptive Negative    Narrative:      DNA from a toxigenic strain of C.difficile has been detected. Antigen testing for the presence of free C.difficile toxin is currently in progress, to help determine the clinical significance of this PCR result.     Clostridioides difficile toxin Ag, Reflex - Stool, Per Rectum [[id number]]  (Normal) Collected: 01/22/25 1517    Lab Status: Final result Specimen: Stool from Per Rectum Updated: 01/22/25 1645     C.diff Toxin Ag Negative    Narrative:      DNA from a toxigenic strain of C.difficile was detected, although the free toxin itself was not detected. These findings are consistent with C.difficile colonization and may not reflect actual C.difficile infection. Clinical correlation needed.    Urine Culture - Urine, Urine, Clean Catch [[id number]] Collected: 01/21/25 1703    Lab Status: Final result Specimen: Urine, Clean Catch Updated: 01/22/25 1141     Urine Culture 25,000 CFU/mL Normal Urogenital Berenice    Narrative:      Colonization of the urinary tract without infection is common. Treatment is discouraged unless the patient is symptomatic, pregnant, or undergoing an invasive urologic procedure.          [x]  Radiology  XR Chest 1 View    Result Date: 1/21/2025  Impression: An acute pulmonary process is not apparent. Electronically Signed: Jorge Urban MD  1/21/2025 4:22 PM EST  Workstation ID: [id number]   []  EKG/Telemetry   []  Cardiology/Vascular   []  Pathology  []  Old records  []  Other:    Assessment & Plan   Assessment / Plan     Assessment:  Unable to take care of himself at home  History of chronic diastolic congestive heart  failure  paroxysmal A-fib on Eliquis  Type 2 diabetes  Morbid obesity  Debility with wheelchair dependence  Osteoarthritis of hip  Chronic venous stasis  Deconditioning  Hemorrhoids   Anasarca    Plan:  Patient is being managed in outpatient bed.  Presented after not being able to take care of himself and had been laying in feces/urine for past 3 days.  Patient was cleaned and  working on his disposition.  Referral sent to [facility] nursing and rehab at [place].  Pending disposition.  Home medication reconciled and restarted.    Found to have C diff colonization; negative for toxin. Has history of C. difficile colonization.  No leukocytosis and renal function stable.   On as needed baclofen for muscle spasm.  On PRN Norco for pain every 6 hours.  Magic mouthwash and Orajel as needed for mouth pain.  No signs of infection to my evaluation.  Continue rest of current management.  Lab work in AM.  Patient to be discharged home if he refuses rehab placement.  Pending disposition.     Discussed with RN.    VTE Prophylaxis:  Pharmacologic VTE prophylaxis orders are present.        CODE STATUS:   Code Status (Patient has no pulse and is not breathing): CPR (Attempt to Resuscitate)  Medical Interventions (Patient has pulse or is breathing): Full Support      Electronically signed by Janis Lam MD, 01/25/25, 8:21 AM EST.

## 2025-01-25 NOTE — PLAN OF CARE
Goal Outcome Evaluation:  Plan of Care Reviewed With: patient            Pt had 2 loose BM during shift. VSS.Blood sugar monitored.  PRN pain medication given per MAR. Patient refused Q2 turn. Maalox given for c/o indigestion, per pt intervention effective. No acute events to report overnight. Continue plan of care.

## 2025-01-25 NOTE — PLAN OF CARE
Problem: Adult Inpatient Plan of Care  Goal: Plan of Care Review  Outcome: Progressing  Flowsheets (Taken 1/25/2025 1540)  Progress: no change  Outcome Evaluation: Patient is alert and oriented x4. Patient has had complaints of pain, see EMAR. Patient has no complaints at this time.  Plan of Care Reviewed With: patient  Goal: Patient-Specific Goal (Individualized)  Outcome: Progressing  Goal: Absence of Hospital-Acquired Illness or Injury  Outcome: Progressing  Intervention: Identify and Manage Fall Risk  Recent Flowsheet Documentation  Taken 1/25/2025 1535 by Mae Francisco, RN  Safety Promotion/Fall Prevention:   assistive device/personal items within reach   clutter free environment maintained   fall prevention program maintained   lighting adjusted   nonskid shoes/slippers when out of bed   room organization consistent   safety round/check completed  Taken 1/25/2025 1438 by Mae Francisco, RN  Safety Promotion/Fall Prevention:   assistive device/personal items within reach   clutter free environment maintained   fall prevention program maintained   lighting adjusted   nonskid shoes/slippers when out of bed   room organization consistent   safety round/check completed  Taken 1/25/2025 1244 by Mae Francisco, RN  Safety Promotion/Fall Prevention:   assistive device/personal items within reach   clutter free environment maintained   fall prevention program maintained   lighting adjusted   nonskid shoes/slippers when out of bed   room organization consistent   safety round/check completed  Taken 1/25/2025 1219 by Mae Francisco, RN  Safety Promotion/Fall Prevention:   assistive device/personal items within reach   clutter free environment maintained   fall prevention program maintained   lighting adjusted   nonskid shoes/slippers when out of bed   room organization consistent   safety round/check completed  Taken 1/25/2025 0850 by Mae Francisco, RN  Safety Promotion/Fall Prevention:   assistive  device/personal items within reach   clutter free environment maintained   fall prevention program maintained   lighting adjusted   nonskid shoes/slippers when out of bed   room organization consistent   safety round/check completed  Taken 1/25/2025 0757 by Mae Francisco RN  Safety Promotion/Fall Prevention:   assistive device/personal items within reach   clutter free environment maintained   fall prevention program maintained   lighting adjusted   nonskid shoes/slippers when out of bed   room organization consistent   safety round/check completed  Taken 1/25/2025 0725 by Mae Francisco RN  Safety Promotion/Fall Prevention:   assistive device/personal items within reach   clutter free environment maintained   fall prevention program maintained   lighting adjusted   nonskid shoes/slippers when out of bed   room organization consistent   safety round/check completed  Intervention: Prevent Infection  Recent Flowsheet Documentation  Taken 1/25/2025 1535 by Mae Francisco RN  Infection Prevention:   cohorting utilized   environmental surveillance performed   equipment surfaces disinfected   hand hygiene promoted   personal protective equipment utilized   rest/sleep promoted   single patient room provided  Taken 1/25/2025 1438 by Mae Francisco RN  Infection Prevention:   cohorting utilized   environmental surveillance performed   equipment surfaces disinfected   hand hygiene promoted   personal protective equipment utilized   rest/sleep promoted   single patient room provided  Taken 1/25/2025 1244 by Mae Francisco RN  Infection Prevention:   cohorting utilized   environmental surveillance performed   equipment surfaces disinfected   hand hygiene promoted   personal protective equipment utilized   rest/sleep promoted   single patient room provided  Taken 1/25/2025 1219 by Mae Francisco RN  Infection Prevention:   cohorting utilized   environmental surveillance performed   equipment surfaces disinfected    hand hygiene promoted   personal protective equipment utilized   rest/sleep promoted   single patient room provided  Taken 1/25/2025 0850 by Mae Francisco RN  Infection Prevention:   cohorting utilized   environmental surveillance performed   equipment surfaces disinfected   hand hygiene promoted   personal protective equipment utilized   rest/sleep promoted   single patient room provided  Taken 1/25/2025 0757 by Mae Francisco RN  Infection Prevention:   cohorting utilized   environmental surveillance performed   equipment surfaces disinfected   hand hygiene promoted   personal protective equipment utilized   rest/sleep promoted   single patient room provided  Taken 1/25/2025 0725 by Mae Francisco RN  Infection Prevention:   cohorting utilized   environmental surveillance performed   equipment surfaces disinfected   hand hygiene promoted   personal protective equipment utilized   rest/sleep promoted   single patient room provided  Goal: Optimal Comfort and Wellbeing  Outcome: Progressing  Intervention: Monitor Pain and Promote Comfort  Recent Flowsheet Documentation  Taken 1/25/2025 1438 by Mae Francisco RN  Pain Management Interventions:   care clustered   pain management plan reviewed with patient/caregiver   pain medication given   quiet environment facilitated  Taken 1/25/2025 0757 by Mae Francisco RN  Pain Management Interventions:   care clustered   pain management plan reviewed with patient/caregiver   pain medication given   quiet environment facilitated  Intervention: Provide Person-Centered Care  Recent Flowsheet Documentation  Taken 1/25/2025 0757 by Mae Francisco RN  Trust Relationship/Rapport:   care explained   choices provided   emotional support provided   empathic listening provided   questions answered   questions encouraged   thoughts/feelings acknowledged   reassurance provided  Goal: Readiness for Transition of Care  Outcome: Progressing     Problem: Fall Injury Risk  Goal:  Absence of Fall and Fall-Related Injury  Outcome: Progressing  Intervention: Identify and Manage Contributors  Recent Flowsheet Documentation  Taken 1/25/2025 0757 by Mae Francisco, RN  Medication Review/Management:   medications reviewed   high-risk medications identified  Intervention: Promote Injury-Free Environment  Recent Flowsheet Documentation  Taken 1/25/2025 1535 by Mae Francisco, RN  Safety Promotion/Fall Prevention:   assistive device/personal items within reach   clutter free environment maintained   fall prevention program maintained   lighting adjusted   nonskid shoes/slippers when out of bed   room organization consistent   safety round/check completed  Taken 1/25/2025 1438 by Mae Francisco, RN  Safety Promotion/Fall Prevention:   assistive device/personal items within reach   clutter free environment maintained   fall prevention program maintained   lighting adjusted   nonskid shoes/slippers when out of bed   room organization consistent   safety round/check completed  Taken 1/25/2025 1244 by Mae Francisco, RN  Safety Promotion/Fall Prevention:   assistive device/personal items within reach   clutter free environment maintained   fall prevention program maintained   lighting adjusted   nonskid shoes/slippers when out of bed   room organization consistent   safety round/check completed  Taken 1/25/2025 1219 by Mae Francisco, RN  Safety Promotion/Fall Prevention:   assistive device/personal items within reach   clutter free environment maintained   fall prevention program maintained   lighting adjusted   nonskid shoes/slippers when out of bed   room organization consistent   safety round/check completed  Taken 1/25/2025 0850 by Mae Francisco, RN  Safety Promotion/Fall Prevention:   assistive device/personal items within reach   clutter free environment maintained   fall prevention program maintained   lighting adjusted   nonskid shoes/slippers when out of bed   room organization  consistent   safety round/check completed  Taken 1/25/2025 0757 by Mae Francisco, RN  Safety Promotion/Fall Prevention:   assistive device/personal items within reach   clutter free environment maintained   fall prevention program maintained   lighting adjusted   nonskid shoes/slippers when out of bed   room organization consistent   safety round/check completed  Taken 1/25/2025 0725 by Mae Francisco, RN  Safety Promotion/Fall Prevention:   assistive device/personal items within reach   clutter free environment maintained   fall prevention program maintained   lighting adjusted   nonskid shoes/slippers when out of bed   room organization consistent   safety round/check completed     Problem: Wound  Goal: Optimal Coping  Outcome: Progressing  Goal: Optimal Functional Ability  Outcome: Progressing  Goal: Absence of Infection Signs and Symptoms  Outcome: Progressing  Goal: Improved Oral Intake  Outcome: Progressing  Goal: Optimal Pain Control and Function  Outcome: Progressing  Intervention: Prevent or Manage Pain  Recent Flowsheet Documentation  Taken 1/25/2025 1438 by Mae Francisco, RN  Pain Management Interventions:   care clustered   pain management plan reviewed with patient/caregiver   pain medication given   quiet environment facilitated  Taken 1/25/2025 0757 by Mae Francisco, RN  Pain Management Interventions:   care clustered   pain management plan reviewed with patient/caregiver   pain medication given   quiet environment facilitated  Goal: Skin Health and Integrity  Outcome: Progressing  Goal: Optimal Wound Healing  Outcome: Progressing     Problem: Skin Injury Risk Increased  Goal: Skin Health and Integrity  Outcome: Progressing     Problem: Pain Acute  Goal: Optimal Pain Control and Function  Outcome: Progressing  Intervention: Develop Pain Management Plan  Recent Flowsheet Documentation  Taken 1/25/2025 1438 by Mae Francisco, RN  Pain Management Interventions:   care clustered   pain management  plan reviewed with patient/caregiver   pain medication given   quiet environment facilitated  Taken 1/25/2025 0757 by Mae Francisco, RN  Pain Management Interventions:   care clustered   pain management plan reviewed with patient/caregiver   pain medication given   quiet environment facilitated  Intervention: Prevent or Manage Pain  Recent Flowsheet Documentation  Taken 1/25/2025 0757 by Mae Francisco, RN  Medication Review/Management:   medications reviewed   high-risk medications identified   Goal Outcome Evaluation:  Plan of Care Reviewed With: patient        Progress: no change  Outcome Evaluation: Patient is alert and oriented x4. Patient has had complaints of pain, see EMAR. Patient has no complaints at this time.Pt refused dressing change to shoulder. BLE wrapped with kerlix and ACE wrap this shift.

## 2025-01-26 LAB
ANION GAP SERPL CALCULATED.3IONS-SCNC: 7.5 MMOL/L (ref 5–15)
ANISOCYTOSIS BLD QL: NORMAL
BASOPHILS # BLD AUTO: 0.04 10*3/MM3 (ref 0–0.2)
BASOPHILS NFR BLD AUTO: 1 % (ref 0–1.5)
BUN SERPL-MCNC: 10 MG/DL (ref 8–23)
BUN/CREAT SERPL: 23.3 (ref 7–25)
CALCIUM SPEC-SCNC: 8 MG/DL (ref 8.6–10.5)
CHLORIDE SERPL-SCNC: 98 MMOL/L (ref 98–107)
CO2 SERPL-SCNC: 26.5 MMOL/L (ref 22–29)
CREAT SERPL-MCNC: 0.43 MG/DL (ref 0.76–1.27)
DEPRECATED RDW RBC AUTO: 60.5 FL (ref 37–54)
EGFRCR SERPLBLD CKD-EPI 2021: 117.7 ML/MIN/1.73
ELLIPTOCYTES BLD QL SMEAR: NORMAL
EOSINOPHIL # BLD AUTO: 0.11 10*3/MM3 (ref 0–0.4)
EOSINOPHIL NFR BLD AUTO: 2.8 % (ref 0.3–6.2)
ERYTHROCYTE [DISTWIDTH] IN BLOOD BY AUTOMATED COUNT: 24.2 % (ref 12.3–15.4)
GLUCOSE BLDC GLUCOMTR-MCNC: 152 MG/DL (ref 70–99)
GLUCOSE BLDC GLUCOMTR-MCNC: 192 MG/DL (ref 70–99)
GLUCOSE BLDC GLUCOMTR-MCNC: 222 MG/DL (ref 70–99)
GLUCOSE BLDC GLUCOMTR-MCNC: 275 MG/DL (ref 70–99)
GLUCOSE SERPL-MCNC: 172 MG/DL (ref 65–99)
HCT VFR BLD AUTO: 34.3 % (ref 37.5–51)
HGB BLD-MCNC: 10.2 G/DL (ref 13–17.7)
IMM GRANULOCYTES # BLD AUTO: 0.01 10*3/MM3 (ref 0–0.05)
IMM GRANULOCYTES NFR BLD AUTO: 0.3 % (ref 0–0.5)
LYMPHOCYTES # BLD AUTO: 1.04 10*3/MM3 (ref 0.7–3.1)
LYMPHOCYTES NFR BLD AUTO: 26.5 % (ref 19.6–45.3)
MAGNESIUM SERPL-MCNC: 1.8 MG/DL (ref 1.6–2.4)
MCH RBC QN AUTO: 21.1 PG (ref 26.6–33)
MCHC RBC AUTO-ENTMCNC: 29.7 G/DL (ref 31.5–35.7)
MCV RBC AUTO: 71 FL (ref 79–97)
MONOCYTES # BLD AUTO: 0.49 10*3/MM3 (ref 0.1–0.9)
MONOCYTES NFR BLD AUTO: 12.5 % (ref 5–12)
NEUTROPHILS NFR BLD AUTO: 2.24 10*3/MM3 (ref 1.7–7)
NEUTROPHILS NFR BLD AUTO: 56.9 % (ref 42.7–76)
NRBC BLD AUTO-RTO: 0 /100 WBC (ref 0–0.2)
PHOSPHATE SERPL-MCNC: 2.5 MG/DL (ref 2.5–4.5)
PLATELET # BLD AUTO: 118 10*3/MM3 (ref 140–450)
PMV BLD AUTO: ABNORMAL FL
POTASSIUM SERPL-SCNC: 3.5 MMOL/L (ref 3.5–5.2)
RBC # BLD AUTO: 4.83 10*6/MM3 (ref 4.14–5.8)
SMALL PLATELETS BLD QL SMEAR: ADEQUATE
SODIUM SERPL-SCNC: 132 MMOL/L (ref 136–145)
WBC MORPH BLD: NORMAL
WBC NRBC COR # BLD AUTO: 3.93 10*3/MM3 (ref 3.4–10.8)

## 2025-01-26 PROCEDURE — 63710000001 APIXABAN 5 MG TABLET: Performed by: STUDENT IN AN ORGANIZED HEALTH CARE EDUCATION/TRAINING PROGRAM

## 2025-01-26 PROCEDURE — A9270 NON-COVERED ITEM OR SERVICE: HCPCS | Performed by: STUDENT IN AN ORGANIZED HEALTH CARE EDUCATION/TRAINING PROGRAM

## 2025-01-26 PROCEDURE — 63710000001: Performed by: STUDENT IN AN ORGANIZED HEALTH CARE EDUCATION/TRAINING PROGRAM

## 2025-01-26 PROCEDURE — 63710000001 BACLOFEN 10 MG TABLET: Performed by: STUDENT IN AN ORGANIZED HEALTH CARE EDUCATION/TRAINING PROGRAM

## 2025-01-26 PROCEDURE — 63710000001 FERROUS SULFATE 325 (65 FE) MG TABLET: Performed by: STUDENT IN AN ORGANIZED HEALTH CARE EDUCATION/TRAINING PROGRAM

## 2025-01-26 PROCEDURE — 63710000001 BUPROPION XL 150 MG TABLET SUSTAINED-RELEASE 24 HOUR: Performed by: STUDENT IN AN ORGANIZED HEALTH CARE EDUCATION/TRAINING PROGRAM

## 2025-01-26 PROCEDURE — 63710000001 BUMETANIDE 1 MG TABLET: Performed by: STUDENT IN AN ORGANIZED HEALTH CARE EDUCATION/TRAINING PROGRAM

## 2025-01-26 PROCEDURE — 63710000001 SERTRALINE 50 MG TABLET: Performed by: STUDENT IN AN ORGANIZED HEALTH CARE EDUCATION/TRAINING PROGRAM

## 2025-01-26 PROCEDURE — 82948 REAGENT STRIP/BLOOD GLUCOSE: CPT

## 2025-01-26 PROCEDURE — A9270 NON-COVERED ITEM OR SERVICE: HCPCS

## 2025-01-26 PROCEDURE — 80048 BASIC METABOLIC PNL TOTAL CA: CPT | Performed by: STUDENT IN AN ORGANIZED HEALTH CARE EDUCATION/TRAINING PROGRAM

## 2025-01-26 PROCEDURE — 63710000001 DIGOXIN 125 MCG TABLET: Performed by: STUDENT IN AN ORGANIZED HEALTH CARE EDUCATION/TRAINING PROGRAM

## 2025-01-26 PROCEDURE — 85007 BL SMEAR W/DIFF WBC COUNT: CPT | Performed by: STUDENT IN AN ORGANIZED HEALTH CARE EDUCATION/TRAINING PROGRAM

## 2025-01-26 PROCEDURE — 97602 WOUND(S) CARE NON-SELECTIVE: CPT

## 2025-01-26 PROCEDURE — 99213 OFFICE O/P EST LOW 20 MIN: CPT | Performed by: STUDENT IN AN ORGANIZED HEALTH CARE EDUCATION/TRAINING PROGRAM

## 2025-01-26 PROCEDURE — 63710000001 MAGNESIUM OXIDE -MG SUPPLEMENT 400 (240 MG) MG TABLET

## 2025-01-26 PROCEDURE — 63710000001 LOPERAMIDE 2 MG CAPSULE: Performed by: STUDENT IN AN ORGANIZED HEALTH CARE EDUCATION/TRAINING PROGRAM

## 2025-01-26 PROCEDURE — 84100 ASSAY OF PHOSPHORUS: CPT | Performed by: STUDENT IN AN ORGANIZED HEALTH CARE EDUCATION/TRAINING PROGRAM

## 2025-01-26 PROCEDURE — 63710000001 HYDROCODONE-ACETAMINOPHEN 5-325 MG TABLET: Performed by: STUDENT IN AN ORGANIZED HEALTH CARE EDUCATION/TRAINING PROGRAM

## 2025-01-26 PROCEDURE — 85025 COMPLETE CBC W/AUTO DIFF WBC: CPT | Performed by: STUDENT IN AN ORGANIZED HEALTH CARE EDUCATION/TRAINING PROGRAM

## 2025-01-26 PROCEDURE — 83735 ASSAY OF MAGNESIUM: CPT | Performed by: STUDENT IN AN ORGANIZED HEALTH CARE EDUCATION/TRAINING PROGRAM

## 2025-01-26 PROCEDURE — 63710000001 INSULIN GLARGINE PER 5 UNITS: Performed by: STUDENT IN AN ORGANIZED HEALTH CARE EDUCATION/TRAINING PROGRAM

## 2025-01-26 PROCEDURE — 82948 REAGENT STRIP/BLOOD GLUCOSE: CPT | Performed by: STUDENT IN AN ORGANIZED HEALTH CARE EDUCATION/TRAINING PROGRAM

## 2025-01-26 PROCEDURE — 63710000001 ATORVASTATIN 10 MG TABLET: Performed by: STUDENT IN AN ORGANIZED HEALTH CARE EDUCATION/TRAINING PROGRAM

## 2025-01-26 PROCEDURE — 63710000001 INSULIN LISPRO (HUMAN) PER 5 UNITS: Performed by: STUDENT IN AN ORGANIZED HEALTH CARE EDUCATION/TRAINING PROGRAM

## 2025-01-26 RX ORDER — LOPERAMIDE HYDROCHLORIDE 2 MG/1
4 CAPSULE ORAL 4 TIMES DAILY PRN
Status: DISCONTINUED | OUTPATIENT
Start: 2025-01-26 | End: 2025-01-30 | Stop reason: HOSPADM

## 2025-01-26 RX ADMIN — FERROUS SULFATE TAB 325 MG (65 MG ELEMENTAL FE) 325 MG: 325 (65 FE) TAB at 09:09

## 2025-01-26 RX ADMIN — APIXABAN 5 MG: 5 TABLET, FILM COATED ORAL at 09:09

## 2025-01-26 RX ADMIN — APIXABAN 5 MG: 5 TABLET, FILM COATED ORAL at 21:51

## 2025-01-26 RX ADMIN — DIGOXIN 125 MCG: 125 TABLET ORAL at 12:31

## 2025-01-26 RX ADMIN — Medication 400 MG: at 09:09

## 2025-01-26 RX ADMIN — INSULIN LISPRO 2 UNITS: 100 INJECTION, SOLUTION INTRAVENOUS; SUBCUTANEOUS at 09:09

## 2025-01-26 RX ADMIN — INSULIN LISPRO 4 UNITS: 100 INJECTION, SOLUTION INTRAVENOUS; SUBCUTANEOUS at 12:31

## 2025-01-26 RX ADMIN — INSULIN LISPRO 2 UNITS: 100 INJECTION, SOLUTION INTRAVENOUS; SUBCUTANEOUS at 17:33

## 2025-01-26 RX ADMIN — HYDROCODONE BITARTRATE AND ACETAMINOPHEN 1 TABLET: 5; 325 TABLET ORAL at 10:07

## 2025-01-26 RX ADMIN — BACLOFEN 10 MG: 10 TABLET ORAL at 10:07

## 2025-01-26 RX ADMIN — INSULIN GLARGINE 10 UNITS: 100 INJECTION, SOLUTION SUBCUTANEOUS at 09:09

## 2025-01-26 RX ADMIN — DIPHENHYDRAMINE HYDROCHLORIDE AND LIDOCAINE HYDROCHLORIDE AND ALUMINUM HYDROXIDE AND MAGNESIUM HYDRO 10 ML: KIT at 21:52

## 2025-01-26 RX ADMIN — BENZOCAINE 1 APPLICATION: 200 GEL DENTAL; ORAL; PERIODONTAL at 07:47

## 2025-01-26 RX ADMIN — ATORVASTATIN CALCIUM 10 MG: 10 TABLET, FILM COATED ORAL at 21:51

## 2025-01-26 RX ADMIN — Medication 10 ML: at 21:52

## 2025-01-26 RX ADMIN — INSULIN LISPRO 6 UNITS: 100 INJECTION, SOLUTION INTRAVENOUS; SUBCUTANEOUS at 21:51

## 2025-01-26 RX ADMIN — SERTRALINE HYDROCHLORIDE 50 MG: 50 TABLET ORAL at 21:51

## 2025-01-26 RX ADMIN — LOPERAMIDE HYDROCHLORIDE 4 MG: 2 CAPSULE ORAL at 17:33

## 2025-01-26 RX ADMIN — HYDROCODONE BITARTRATE AND ACETAMINOPHEN 1 TABLET: 5; 325 TABLET ORAL at 17:33

## 2025-01-26 RX ADMIN — BACLOFEN 10 MG: 10 TABLET ORAL at 21:52

## 2025-01-26 RX ADMIN — Medication 10 ML: at 09:10

## 2025-01-26 RX ADMIN — HYDROCODONE BITARTRATE AND ACETAMINOPHEN 1 TABLET: 5; 325 TABLET ORAL at 04:09

## 2025-01-26 RX ADMIN — BUMETANIDE 1 MG: 1 TABLET ORAL at 09:09

## 2025-01-26 RX ADMIN — BUPROPION HYDROCHLORIDE 150 MG: 150 TABLET, EXTENDED RELEASE ORAL at 09:09

## 2025-01-26 NOTE — PLAN OF CARE
Goal Outcome Evaluation:  Plan of Care Reviewed With: patient        Progress: no change     PT alert and oriented x 4. Contiues with loose stools. vss

## 2025-01-26 NOTE — PROGRESS NOTES
[facility]   Hospitalist Progress Note  Date: 2025  Patient Name: Jose Shaikh  : 1960  MRN: [id number]  Date of admission: 2025  Room/Bed: 3021/1      Subjective   Subjective     Chief Complaint: unable to take care of self     Summary:Jose Shaikh is a 64 y.o. male  with history of chronic diastolic congestive heart failure, paroxysmal A-fib on Eliquis, type 2 diabetes, morbid obesity, debility with wheelchair dependence, osteoarthritis of hip, chronic venous stasis, deconditioning, hemorrhoids and anasarca who presented to ER after not being able to take care of himself at home.  Patient reported that he had been laying in his own feces/urine for last several days.  He lives alone at home and came to the ER for cleanup.  His potassium was low when he presented to ER which was repleted.  Vital stable.  ER  was involved as patient is not interested in going to nursing home in [place].   contacted [facility] nursing and rehab; patient agreeable to start the referral.  Admitted for pending discharge to nursing home.  No other active issues.  Patient was laying in bed during my evaluation and was getting clean.  No active issues.  Wound care on board. Pending disposition.    Interval Followup: No acute events overnight.  As needed Norco frequency increased to every 6 hours.  On Orajel as needed for mucositis.  No other active complaints.     Review of Systems    All systems reviewed and negative except for what is outlined above.      Objective   Objective     Vitals:   Temp:  [97.7 °F (36.5 °C)-98.4 °F (36.9 °C)] 97.7 °F (36.5 °C)  Heart Rate:  [85-93] 88  Resp:  [18-20] 18  BP: (116-139)/(60-99) 131/72    Physical Exam   General: Awake, alert, NAD  Cardiovascular: RRR, no murmurs   Pulmonary: CTA bilaterally; no wheezes; no conversational dyspnea  Gastrointestinal: S/ND/NT, +BS  Neuro: alert, awake, oriented x 3; speech clear; no tremor      Result Review     Result Review:  I have personally reviewed these results:  [x]  Laboratory      Lab 01/26/25  0536 01/25/25  0613 01/24/25  0550   WBC 3.93 4.29 4.75   HEMOGLOBIN 10.2* 10.2* 10.7*   HEMATOCRIT 34.3* 33.8* 35.6*   PLATELETS 118* 125* 157   NEUTROS ABS 2.24 2.60 2.72   IMMATURE GRANS (ABS) 0.01 0.02 0.02   LYMPHS ABS 1.04 1.01 1.22   MONOS ABS 0.49 0.51 0.57   EOS ABS 0.11 0.10 0.14   MCV 71.0* 70.1* 70.4*         Lab 01/26/25  0536 01/25/25  0613 01/24/25  0550   SODIUM 132* 132* 131*   POTASSIUM 3.5 3.6 3.7   CHLORIDE 98 98 98   CO2 26.5 25.6 24.9   ANION GAP 7.5 8.4 8.1   BUN 10 10 9   CREATININE 0.43* 0.44* 0.36*   EGFR 117.7 116.9 124.2   GLUCOSE 172* 224* 136*   CALCIUM 8.0* 8.0* 7.9*   MAGNESIUM 1.8 1.8 1.7   PHOSPHORUS 2.5 2.1* 2.8         Lab 01/23/25  0605 01/21/25  1707   TOTAL PROTEIN 5.7* 7.0   ALBUMIN 2.6* 3.1*   GLOBULIN  --  3.9   ALT (SGPT) 19 22   AST (SGOT) 43* 42*   BILIRUBIN 1.0 1.4*   INDIRECT BILIRUBIN 0.6  --    BILIRUBIN DIRECT 0.4*  --    ALK PHOS 215* 225*                     Brief Urine Lab Results  (Last result in the past 365 days)        Color   Clarity   Blood   Leuk Est   Nitrite   Protein   CREAT   Urine HCG        01/21/25 1703 Dark Yellow   Clear   Negative   Small (1+)   Negative   Negative                 [x]  Microbiology   Microbiology Results (last 10 days)       Procedure Component Value - Date/Time    Clostridioides difficile Toxin - Stool, Per Rectum [[id number]]  (Abnormal) Collected: 01/22/25 1517    Lab Status: Final result Specimen: Stool from Per Rectum Updated: 01/22/25 1621    Narrative:      The following orders were created for panel order Clostridioides difficile Toxin - Stool, Per Rectum.  Procedure                               Abnormality         Status                     ---------                               -----------         ------                     Clostridioides difficile...[[id number]]  Abnormal            Final result                 Please view  results for these tests on the individual orders.    Clostridioides difficile Toxin, PCR - Stool, Per Rectum [[id number]]  (Abnormal) Collected: 01/22/25 1517    Lab Status: Final result Specimen: Stool from Per Rectum Updated: 01/22/25 1621     Toxigenic C. difficile by PCR Positive     027 Toxin Presumptive Negative    Narrative:      DNA from a toxigenic strain of C.difficile has been detected. Antigen testing for the presence of free C.difficile toxin is currently in progress, to help determine the clinical significance of this PCR result.     Clostridioides difficile toxin Ag, Reflex - Stool, Per Rectum [[id number]]  (Normal) Collected: 01/22/25 1517    Lab Status: Final result Specimen: Stool from Per Rectum Updated: 01/22/25 1645     C.diff Toxin Ag Negative    Narrative:      DNA from a toxigenic strain of C.difficile was detected, although the free toxin itself was not detected. These findings are consistent with C.difficile colonization and may not reflect actual C.difficile infection. Clinical correlation needed.    Urine Culture - Urine, Urine, Clean Catch [[id number]] Collected: 01/21/25 1703    Lab Status: Final result Specimen: Urine, Clean Catch Updated: 01/22/25 1141     Urine Culture 25,000 CFU/mL Normal Urogenital Berenice    Narrative:      Colonization of the urinary tract without infection is common. Treatment is discouraged unless the patient is symptomatic, pregnant, or undergoing an invasive urologic procedure.          [x]  Radiology  XR Chest 1 View    Result Date: 1/21/2025  Impression: An acute pulmonary process is not apparent. Electronically Signed: Jorge Urban MD  1/21/2025 4:22 PM EST  Workstation ID: [id number]   []  EKG/Telemetry   []  Cardiology/Vascular   []  Pathology  []  Old records  []  Other:    Assessment & Plan   Assessment / Plan     Assessment:  Unable to take care of himself at home  History of chronic diastolic congestive heart failure  paroxysmal A-fib on Eliquis  Type 2  diabetes  Morbid obesity  Debility with wheelchair dependence  Osteoarthritis of hip  Chronic venous stasis  Deconditioning  Hemorrhoids   Anasarca    Plan:  Patient is being managed in outpatient bed.  Presented after not being able to take care of himself and had been laying in feces/urine for past 3 days.  Patient was cleaned and  working on his disposition.  Referral sent to [facility] nursing and rehab at [place].  Pending disposition.  Home medication reconciled and restarted.    Found to have C diff colonization; negative for toxin. Has history of C. difficile colonization.  No leukocytosis and renal function stable.   Started on as needed Imodium for diarrhea.  Seems chronic.  On as needed baclofen for muscle spasm.  On PRN Norco for pain every 6 hours.  Magic mouthwash and Orajel as needed for mouth pain.  No signs of infection to my evaluation.  Continue rest of current management.  Lab work in AM.  Patient to be discharged home if he refuses rehab placement.  Pending disposition.     Discussed with RN.    VTE Prophylaxis:  Pharmacologic VTE prophylaxis orders are present.        CODE STATUS:   Code Status (Patient has no pulse and is not breathing): CPR (Attempt to Resuscitate)  Medical Interventions (Patient has pulse or is breathing): Full Support      Electronically signed by Janis Lam MD, 01/26/25, 8:21 AM EST.

## 2025-01-26 NOTE — PLAN OF CARE
Problem: Adult Inpatient Plan of Care  Goal: Plan of Care Review  Outcome: Progressing  Flowsheets (Taken 1/26/2025 1601)  Progress: no change  Outcome Evaluation: Patient is alert and oriented x4. Patient has had complaints of pain, see EMAR. Patient has no complaints at this time.  Plan of Care Reviewed With: patient  Goal: Patient-Specific Goal (Individualized)  Outcome: Progressing  Goal: Absence of Hospital-Acquired Illness or Injury  Outcome: Progressing  Intervention: Identify and Manage Fall Risk  Recent Flowsheet Documentation  Taken 1/26/2025 1430 by Mae Francisco, RN  Safety Promotion/Fall Prevention:   assistive device/personal items within reach   clutter free environment maintained   fall prevention program maintained   lighting adjusted   nonskid shoes/slippers when out of bed   room organization consistent   safety round/check completed  Taken 1/26/2025 1231 by Mae Francisco, RN  Safety Promotion/Fall Prevention:   assistive device/personal items within reach   clutter free environment maintained   fall prevention program maintained   lighting adjusted   nonskid shoes/slippers when out of bed   room organization consistent   safety round/check completed  Taken 1/26/2025 1007 by Mae Francisco, RN  Safety Promotion/Fall Prevention:   assistive device/personal items within reach   clutter free environment maintained   fall prevention program maintained   lighting adjusted   nonskid shoes/slippers when out of bed   room organization consistent   safety round/check completed  Taken 1/26/2025 0910 by Mae Francisco, RN  Safety Promotion/Fall Prevention:   assistive device/personal items within reach   clutter free environment maintained   fall prevention program maintained   lighting adjusted   nonskid shoes/slippers when out of bed   room organization consistent   safety round/check completed  Taken 1/26/2025 0747 by Mae Francisco, RN  Safety Promotion/Fall Prevention:   assistive  device/personal items within reach   clutter free environment maintained   fall prevention program maintained   lighting adjusted   nonskid shoes/slippers when out of bed   room organization consistent   safety round/check completed  Taken 1/26/2025 0714 by Mae Francisco RN  Safety Promotion/Fall Prevention:   assistive device/personal items within reach   clutter free environment maintained   fall prevention program maintained   lighting adjusted   nonskid shoes/slippers when out of bed   room organization consistent   safety round/check completed  Intervention: Prevent Infection  Recent Flowsheet Documentation  Taken 1/26/2025 1430 by Mae Francisco RN  Infection Prevention:   cohorting utilized   environmental surveillance performed   equipment surfaces disinfected   hand hygiene promoted   personal protective equipment utilized   rest/sleep promoted   single patient room provided  Taken 1/26/2025 1231 by Mae Francisco RN  Infection Prevention:   cohorting utilized   environmental surveillance performed   equipment surfaces disinfected   hand hygiene promoted   personal protective equipment utilized   rest/sleep promoted   single patient room provided  Taken 1/26/2025 1007 by Mae Francisco RN  Infection Prevention:   cohorting utilized   environmental surveillance performed   equipment surfaces disinfected   hand hygiene promoted   personal protective equipment utilized   rest/sleep promoted   single patient room provided  Taken 1/26/2025 0910 by Mae Francisco RN  Infection Prevention:   cohorting utilized   environmental surveillance performed   equipment surfaces disinfected   hand hygiene promoted   personal protective equipment utilized   rest/sleep promoted   single patient room provided  Taken 1/26/2025 0747 by Mae Francisco RN  Infection Prevention:   cohorting utilized   environmental surveillance performed   equipment surfaces disinfected   hand hygiene promoted   personal protective  equipment utilized   rest/sleep promoted   single patient room provided  Taken 1/26/2025 0714 by Mae Francisco, RN  Infection Prevention:   cohorting utilized   environmental surveillance performed   equipment surfaces disinfected   hand hygiene promoted   personal protective equipment utilized   rest/sleep promoted   single patient room provided  Goal: Optimal Comfort and Wellbeing  Outcome: Progressing  Intervention: Monitor Pain and Promote Comfort  Recent Flowsheet Documentation  Taken 1/26/2025 1007 by Mae Francisco, RN  Pain Management Interventions:   care clustered   pain management plan reviewed with patient/caregiver   pain medication given   quiet environment facilitated  Intervention: Provide Person-Centered Care  Recent Flowsheet Documentation  Taken 1/26/2025 0747 by Mae Francisco, RN  Trust Relationship/Rapport:   care explained   choices provided   empathic listening provided   emotional support provided   questions answered   questions encouraged   reassurance provided   thoughts/feelings acknowledged  Goal: Readiness for Transition of Care  Outcome: Progressing     Problem: Fall Injury Risk  Goal: Absence of Fall and Fall-Related Injury  Outcome: Progressing  Intervention: Identify and Manage Contributors  Recent Flowsheet Documentation  Taken 1/26/2025 0747 by Mae Francisco RN  Medication Review/Management:   medications reviewed   high-risk medications identified  Intervention: Promote Injury-Free Environment  Recent Flowsheet Documentation  Taken 1/26/2025 1430 by Mae Francisco, RN  Safety Promotion/Fall Prevention:   assistive device/personal items within reach   clutter free environment maintained   fall prevention program maintained   lighting adjusted   nonskid shoes/slippers when out of bed   room organization consistent   safety round/check completed  Taken 1/26/2025 1231 by Mae Francisco, RN  Safety Promotion/Fall Prevention:   assistive device/personal items within reach    clutter free environment maintained   fall prevention program maintained   lighting adjusted   nonskid shoes/slippers when out of bed   room organization consistent   safety round/check completed  Taken 1/26/2025 1007 by Mae Francisco RN  Safety Promotion/Fall Prevention:   assistive device/personal items within reach   clutter free environment maintained   fall prevention program maintained   lighting adjusted   nonskid shoes/slippers when out of bed   room organization consistent   safety round/check completed  Taken 1/26/2025 0910 by Mae Francisco, RN  Safety Promotion/Fall Prevention:   assistive device/personal items within reach   clutter free environment maintained   fall prevention program maintained   lighting adjusted   nonskid shoes/slippers when out of bed   room organization consistent   safety round/check completed  Taken 1/26/2025 0747 by Mae Francisco RN  Safety Promotion/Fall Prevention:   assistive device/personal items within reach   clutter free environment maintained   fall prevention program maintained   lighting adjusted   nonskid shoes/slippers when out of bed   room organization consistent   safety round/check completed  Taken 1/26/2025 0714 by Mae Francisco, RN  Safety Promotion/Fall Prevention:   assistive device/personal items within reach   clutter free environment maintained   fall prevention program maintained   lighting adjusted   nonskid shoes/slippers when out of bed   room organization consistent   safety round/check completed     Problem: Wound  Goal: Optimal Coping  Outcome: Progressing  Goal: Optimal Functional Ability  Outcome: Progressing  Goal: Absence of Infection Signs and Symptoms  Outcome: Progressing  Goal: Improved Oral Intake  Outcome: Progressing  Goal: Optimal Pain Control and Function  Outcome: Progressing  Intervention: Prevent or Manage Pain  Recent Flowsheet Documentation  Taken 1/26/2025 1007 by Mae Francisco RN  Pain Management Interventions:    care clustered   pain management plan reviewed with patient/caregiver   pain medication given   quiet environment facilitated  Goal: Skin Health and Integrity  Outcome: Progressing  Goal: Optimal Wound Healing  Outcome: Progressing     Problem: Skin Injury Risk Increased  Goal: Skin Health and Integrity  Outcome: Progressing     Problem: Pain Acute  Goal: Optimal Pain Control and Function  Outcome: Progressing  Intervention: Develop Pain Management Plan  Recent Flowsheet Documentation  Taken 1/26/2025 1007 by Mae Francisco, RN  Pain Management Interventions:   care clustered   pain management plan reviewed with patient/caregiver   pain medication given   quiet environment facilitated  Intervention: Prevent or Manage Pain  Recent Flowsheet Documentation  Taken 1/26/2025 0747 by Mae Francisco, RN  Medication Review/Management:   medications reviewed   high-risk medications identified   Goal Outcome Evaluation:  Plan of Care Reviewed With: patient        Progress: no change  Outcome Evaluation: Patient is alert and oriented x4. Patient has had complaints of pain, see EMAR. Patient has no complaints at this time.Pt refused dressing change to shoulder. BLE wrapped with kerlix and ACE wrap this shift.

## 2025-01-27 LAB
ANION GAP SERPL CALCULATED.3IONS-SCNC: 7.4 MMOL/L (ref 5–15)
BASOPHILS # BLD AUTO: 0.06 10*3/MM3 (ref 0–0.2)
BASOPHILS NFR BLD AUTO: 1.4 % (ref 0–1.5)
BUN SERPL-MCNC: 10 MG/DL (ref 8–23)
BUN/CREAT SERPL: 25.6 (ref 7–25)
CALCIUM SPEC-SCNC: 8.1 MG/DL (ref 8.6–10.5)
CHLORIDE SERPL-SCNC: 99 MMOL/L (ref 98–107)
CO2 SERPL-SCNC: 25.6 MMOL/L (ref 22–29)
CREAT SERPL-MCNC: 0.39 MG/DL (ref 0.76–1.27)
DEPRECATED RDW RBC AUTO: 60.6 FL (ref 37–54)
EGFRCR SERPLBLD CKD-EPI 2021: 121.3 ML/MIN/1.73
ELLIPTOCYTES BLD QL SMEAR: NORMAL
EOSINOPHIL # BLD AUTO: 0.14 10*3/MM3 (ref 0–0.4)
EOSINOPHIL NFR BLD AUTO: 3.3 % (ref 0.3–6.2)
ERYTHROCYTE [DISTWIDTH] IN BLOOD BY AUTOMATED COUNT: 24.1 % (ref 12.3–15.4)
GLUCOSE BLDC GLUCOMTR-MCNC: 143 MG/DL (ref 70–99)
GLUCOSE BLDC GLUCOMTR-MCNC: 200 MG/DL (ref 70–99)
GLUCOSE BLDC GLUCOMTR-MCNC: 200 MG/DL (ref 70–99)
GLUCOSE BLDC GLUCOMTR-MCNC: 218 MG/DL (ref 70–99)
GLUCOSE SERPL-MCNC: 165 MG/DL (ref 65–99)
HCT VFR BLD AUTO: 35.1 % (ref 37.5–51)
HELMET CELLS: NORMAL
HGB BLD-MCNC: 10.4 G/DL (ref 13–17.7)
IMM GRANULOCYTES # BLD AUTO: 0.01 10*3/MM3 (ref 0–0.05)
IMM GRANULOCYTES NFR BLD AUTO: 0.2 % (ref 0–0.5)
LYMPHOCYTES # BLD AUTO: 1.15 10*3/MM3 (ref 0.7–3.1)
LYMPHOCYTES NFR BLD AUTO: 26.7 % (ref 19.6–45.3)
MAGNESIUM SERPL-MCNC: 1.8 MG/DL (ref 1.6–2.4)
MCH RBC QN AUTO: 21.2 PG (ref 26.6–33)
MCHC RBC AUTO-ENTMCNC: 29.6 G/DL (ref 31.5–35.7)
MCV RBC AUTO: 71.5 FL (ref 79–97)
MONOCYTES # BLD AUTO: 0.5 10*3/MM3 (ref 0.1–0.9)
MONOCYTES NFR BLD AUTO: 11.6 % (ref 5–12)
NEUTROPHILS NFR BLD AUTO: 2.44 10*3/MM3 (ref 1.7–7)
NEUTROPHILS NFR BLD AUTO: 56.8 % (ref 42.7–76)
NRBC BLD AUTO-RTO: 0 /100 WBC (ref 0–0.2)
PHOSPHATE SERPL-MCNC: 2.5 MG/DL (ref 2.5–4.5)
PLATELET # BLD AUTO: 116 10*3/MM3 (ref 140–450)
PMV BLD AUTO: ABNORMAL FL
POTASSIUM SERPL-SCNC: 3.4 MMOL/L (ref 3.5–5.2)
RBC # BLD AUTO: 4.91 10*6/MM3 (ref 4.14–5.8)
SMALL PLATELETS BLD QL SMEAR: NORMAL
SODIUM SERPL-SCNC: 132 MMOL/L (ref 136–145)
TARGETS BLD QL SMEAR: NORMAL
WBC MORPH BLD: NORMAL
WBC NRBC COR # BLD AUTO: 4.3 10*3/MM3 (ref 3.4–10.8)

## 2025-01-27 PROCEDURE — 63710000001 LOPERAMIDE 2 MG CAPSULE: Performed by: STUDENT IN AN ORGANIZED HEALTH CARE EDUCATION/TRAINING PROGRAM

## 2025-01-27 PROCEDURE — A9270 NON-COVERED ITEM OR SERVICE: HCPCS | Performed by: STUDENT IN AN ORGANIZED HEALTH CARE EDUCATION/TRAINING PROGRAM

## 2025-01-27 PROCEDURE — 84100 ASSAY OF PHOSPHORUS: CPT | Performed by: STUDENT IN AN ORGANIZED HEALTH CARE EDUCATION/TRAINING PROGRAM

## 2025-01-27 PROCEDURE — 82948 REAGENT STRIP/BLOOD GLUCOSE: CPT

## 2025-01-27 PROCEDURE — 63710000001 MAGNESIUM OXIDE -MG SUPPLEMENT 400 (240 MG) MG TABLET

## 2025-01-27 PROCEDURE — 80048 BASIC METABOLIC PNL TOTAL CA: CPT | Performed by: STUDENT IN AN ORGANIZED HEALTH CARE EDUCATION/TRAINING PROGRAM

## 2025-01-27 PROCEDURE — A9270 NON-COVERED ITEM OR SERVICE: HCPCS

## 2025-01-27 PROCEDURE — 63710000001 BACLOFEN 10 MG TABLET: Performed by: STUDENT IN AN ORGANIZED HEALTH CARE EDUCATION/TRAINING PROGRAM

## 2025-01-27 PROCEDURE — 99213 OFFICE O/P EST LOW 20 MIN: CPT | Performed by: STUDENT IN AN ORGANIZED HEALTH CARE EDUCATION/TRAINING PROGRAM

## 2025-01-27 PROCEDURE — 85007 BL SMEAR W/DIFF WBC COUNT: CPT | Performed by: STUDENT IN AN ORGANIZED HEALTH CARE EDUCATION/TRAINING PROGRAM

## 2025-01-27 PROCEDURE — 36415 COLL VENOUS BLD VENIPUNCTURE: CPT | Performed by: STUDENT IN AN ORGANIZED HEALTH CARE EDUCATION/TRAINING PROGRAM

## 2025-01-27 PROCEDURE — 63710000001 POTASSIUM CHLORIDE 10 MEQ CAPSULE CONTROLLED-RELEASE: Performed by: STUDENT IN AN ORGANIZED HEALTH CARE EDUCATION/TRAINING PROGRAM

## 2025-01-27 PROCEDURE — 63710000001 APIXABAN 5 MG TABLET: Performed by: STUDENT IN AN ORGANIZED HEALTH CARE EDUCATION/TRAINING PROGRAM

## 2025-01-27 PROCEDURE — 63710000001 BUMETANIDE 1 MG TABLET: Performed by: STUDENT IN AN ORGANIZED HEALTH CARE EDUCATION/TRAINING PROGRAM

## 2025-01-27 PROCEDURE — 97165 OT EVAL LOW COMPLEX 30 MIN: CPT

## 2025-01-27 PROCEDURE — 63710000001 BUPROPION XL 150 MG TABLET SUSTAINED-RELEASE 24 HOUR: Performed by: STUDENT IN AN ORGANIZED HEALTH CARE EDUCATION/TRAINING PROGRAM

## 2025-01-27 PROCEDURE — 63710000001 FERROUS SULFATE 325 (65 FE) MG TABLET: Performed by: STUDENT IN AN ORGANIZED HEALTH CARE EDUCATION/TRAINING PROGRAM

## 2025-01-27 PROCEDURE — 63710000001 ATORVASTATIN 10 MG TABLET: Performed by: STUDENT IN AN ORGANIZED HEALTH CARE EDUCATION/TRAINING PROGRAM

## 2025-01-27 PROCEDURE — 63710000001 SERTRALINE 50 MG TABLET: Performed by: STUDENT IN AN ORGANIZED HEALTH CARE EDUCATION/TRAINING PROGRAM

## 2025-01-27 PROCEDURE — 63710000001 HYDROCODONE-ACETAMINOPHEN 5-325 MG TABLET: Performed by: STUDENT IN AN ORGANIZED HEALTH CARE EDUCATION/TRAINING PROGRAM

## 2025-01-27 PROCEDURE — 97161 PT EVAL LOW COMPLEX 20 MIN: CPT

## 2025-01-27 PROCEDURE — 83735 ASSAY OF MAGNESIUM: CPT | Performed by: STUDENT IN AN ORGANIZED HEALTH CARE EDUCATION/TRAINING PROGRAM

## 2025-01-27 PROCEDURE — 85025 COMPLETE CBC W/AUTO DIFF WBC: CPT | Performed by: STUDENT IN AN ORGANIZED HEALTH CARE EDUCATION/TRAINING PROGRAM

## 2025-01-27 PROCEDURE — 63710000001 INSULIN LISPRO (HUMAN) PER 5 UNITS: Performed by: STUDENT IN AN ORGANIZED HEALTH CARE EDUCATION/TRAINING PROGRAM

## 2025-01-27 PROCEDURE — 63710000001 INSULIN GLARGINE PER 5 UNITS: Performed by: STUDENT IN AN ORGANIZED HEALTH CARE EDUCATION/TRAINING PROGRAM

## 2025-01-27 PROCEDURE — 97602 WOUND(S) CARE NON-SELECTIVE: CPT

## 2025-01-27 PROCEDURE — 63710000001 DIGOXIN 125 MCG TABLET: Performed by: STUDENT IN AN ORGANIZED HEALTH CARE EDUCATION/TRAINING PROGRAM

## 2025-01-27 RX ORDER — POTASSIUM CHLORIDE 750 MG/1
40 CAPSULE, EXTENDED RELEASE ORAL ONCE
Status: COMPLETED | OUTPATIENT
Start: 2025-01-27 | End: 2025-01-27

## 2025-01-27 RX ADMIN — Medication 10 ML: at 08:02

## 2025-01-27 RX ADMIN — INSULIN GLARGINE 10 UNITS: 100 INJECTION, SOLUTION SUBCUTANEOUS at 07:56

## 2025-01-27 RX ADMIN — BACLOFEN 10 MG: 10 TABLET ORAL at 21:25

## 2025-01-27 RX ADMIN — SERTRALINE HYDROCHLORIDE 50 MG: 50 TABLET ORAL at 21:25

## 2025-01-27 RX ADMIN — HYDROCODONE BITARTRATE AND ACETAMINOPHEN 1 TABLET: 5; 325 TABLET ORAL at 07:51

## 2025-01-27 RX ADMIN — BACLOFEN 10 MG: 10 TABLET ORAL at 07:51

## 2025-01-27 RX ADMIN — HYDROCODONE BITARTRATE AND ACETAMINOPHEN 1 TABLET: 5; 325 TABLET ORAL at 15:20

## 2025-01-27 RX ADMIN — LOPERAMIDE HYDROCHLORIDE 4 MG: 2 CAPSULE ORAL at 07:50

## 2025-01-27 RX ADMIN — LOPERAMIDE HYDROCHLORIDE 4 MG: 2 CAPSULE ORAL at 01:39

## 2025-01-27 RX ADMIN — POTASSIUM CHLORIDE 40 MEQ: 750 CAPSULE, EXTENDED RELEASE ORAL at 10:14

## 2025-01-27 RX ADMIN — APIXABAN 5 MG: 5 TABLET, FILM COATED ORAL at 21:25

## 2025-01-27 RX ADMIN — INSULIN LISPRO 4 UNITS: 100 INJECTION, SOLUTION INTRAVENOUS; SUBCUTANEOUS at 21:25

## 2025-01-27 RX ADMIN — INSULIN LISPRO 4 UNITS: 100 INJECTION, SOLUTION INTRAVENOUS; SUBCUTANEOUS at 18:23

## 2025-01-27 RX ADMIN — APIXABAN 5 MG: 5 TABLET, FILM COATED ORAL at 07:51

## 2025-01-27 RX ADMIN — Medication 10 ML: at 21:25

## 2025-01-27 RX ADMIN — INSULIN LISPRO 4 UNITS: 100 INJECTION, SOLUTION INTRAVENOUS; SUBCUTANEOUS at 13:04

## 2025-01-27 RX ADMIN — HYDROCODONE BITARTRATE AND ACETAMINOPHEN 1 TABLET: 5; 325 TABLET ORAL at 21:25

## 2025-01-27 RX ADMIN — HYDROCODONE BITARTRATE AND ACETAMINOPHEN 1 TABLET: 5; 325 TABLET ORAL at 01:36

## 2025-01-27 RX ADMIN — ATORVASTATIN CALCIUM 10 MG: 10 TABLET, FILM COATED ORAL at 21:25

## 2025-01-27 RX ADMIN — BENZOCAINE: 200 GEL DENTAL; ORAL; PERIODONTAL at 07:56

## 2025-01-27 RX ADMIN — Medication 400 MG: at 07:50

## 2025-01-27 RX ADMIN — FERROUS SULFATE TAB 325 MG (65 MG ELEMENTAL FE) 325 MG: 325 (65 FE) TAB at 07:51

## 2025-01-27 RX ADMIN — DIGOXIN 125 MCG: 125 TABLET ORAL at 13:04

## 2025-01-27 RX ADMIN — BUMETANIDE 1 MG: 1 TABLET ORAL at 07:51

## 2025-01-27 RX ADMIN — BUPROPION HYDROCHLORIDE 150 MG: 150 TABLET, EXTENDED RELEASE ORAL at 07:51

## 2025-01-27 NOTE — PLAN OF CARE
Goal Outcome Evaluation:  Plan of Care Reviewed With: patient        Progress: no change  Outcome Evaluation: Patient presents with balance, strength, endurance limitations that impede his/her ability to perform ADLS. The skills of a therapist are necessary to maximize independence with ADLs.    Anticipated Discharge Disposition (OT): sub acute care setting

## 2025-01-27 NOTE — CONSULTS
"Nutrition Services    Patient Name: Jose Shaikh  YOB: 1960  MRN: [id number]  Admission date: 1/21/2025      CLINICAL NUTRITION ASSESSMENT      Reason for Assessment  Follow Up     H&P:  Past Medical History:   Diagnosis Date    Absence of toe of right foot     Acute osteomyelitis of left calcaneus  08/18/2021    Anxiety and depression     Arthritis     Cancer     Chronic pain     STATES HIS PAIN IS 10/10 AAT    Claustrophobia     Corns and callus     Diabetic ulcer of left heel associated with type 2 DM 08/18/2021    Diabetic ulcer of left heel associated with type 2 DM 07/06/2021    Diabetic ulcer of right midfoot associated with type 2 DM 08/18/2021    Difficulty walking     Essential hypertension 08/31/2021    Hammertoe     Hyperlipidemia LDL goal <100 08/31/2021    Ingrown toenail     Obesity     Paroxysmal atrial fibrillation 08/31/2021    Polyneuropathy     Pressure ulcer, stage 1     Tinea unguium     Type 2 diabetes mellitus with polyneuropathy         Current Problems:   Active Hospital Problems    Diagnosis     **Unable to care for self     Severe protein-calorie malnutrition         Nutrition/Diet History         Narrative   Appetite fair. Intake variable but improving. Tolerates some of ONS; will continue.  Skin- scattered trauma/scabs to BUE and BLE. Buttocks with serous blistering to outer buttocks area, upper back with open area along old incisional scar, open wound with palpable bone to remaining right foot/stump site (wound RN note, 1/22).  +diarrhea, on Imodium.  RD to follow per protocol. Continue diet as ordered, yogurt TID and ONS BID.     Anthropometrics        Current Height, Weight Height: 188 cm (74\")  Weight: 133 kg (293 lb 3.4 oz)   Current BMI Body mass index is 37.65 kg/m².   BMI Classification Obese Class II   % %, IBW 86.3 kg   Adjusted Body Weight (ABW)    Weight Hx  Wt Readings from Last 11 Encounters:   01/22/25 1513 133 kg (293 lb 3.4 oz)   01/22/25 0036 " 83.5 kg (184 lb)   01/14/25 0548 (!) 184 kg (405 lb 3.2 oz)   01/13/25 0431 (!) 186 kg (410 lb 9.6 oz)   01/10/25 0340 (!) 198 kg (435 lb 10.1 oz)   01/09/25 0305 (!) 198 kg (436 lb 8.2 oz)   01/08/25 0502 (!) 197 kg (434 lb 8.4 oz)   01/07/25 0704 (!) 195 kg (430 lb 1.6 oz)   12/19/24 0202 (!) 187 kg (411 lb 13.1 oz)   12/18/24 1455 (!) 195 kg (430 lb 12.5 oz)   12/15/24 1912 (!) 199 kg (438 lb 11.5 oz)   12/15/24 1846 (!) 199 kg (439 lb 6 oz)   12/12/24 0730 (!) 175 kg (384 lb 14.8 oz)   12/06/24 2226 (!) 177 kg (389 lb 12.4 oz)   11/30/24 1436 (!) 174 kg (383 lb 9.6 oz)   11/25/24 1600 (!) 166 kg (365 lb 15.4 oz)   11/19/24 1534 (!) 167 kg (369 lb)   11/08/24 1527 (!) 168 kg (369 lb 11.4 oz)   10/26/24 0615 (!) 167 kg (368 lb 2.7 oz)   10/26/24 0609 (!) 155 kg (341 lb 11.4 oz)   10/09/24 0950 (!) 155 kg (341 lb 7.9 oz)   10/02/24 0805 (!) 150 kg (330 lb 0.5 oz)          Wt Change Observation Wt down 112# in 1 week; question accuracy. UBW of 410-435#     Estimated/Assessed Needs  Estimated Needs based on: Ideal Body Weight 86 kg       Energy Requirements 25-35 kcal/kg   EST Needs (kcal/day) 0860-0810 kcal       Protein Requirements 1.5 g/kg -1.7 g/kg   EST Daily Needs (g/day) 130-145 g       Fluid Requirements 20-25 ml/kg    Estimated Needs (mL/day) 7759-7216 mL     Labs/Medications         Pertinent Labs Reviewed. CHF, hyponatremia. DM, hyperglycemia   Results from last 7 days   Lab Units 01/27/25  0508 01/26/25  0536 01/25/25  0613 01/24/25  0550 01/23/25  0605 01/21/25  1707   SODIUM mmol/L 132* 132* 132*   < > 129* 135*   POTASSIUM mmol/L 3.4* 3.5 3.6   < > 3.4* 3.2*   CHLORIDE mmol/L 99 98 98   < > 96* 94*   CO2 mmol/L 25.6 26.5 25.6   < > 24.4 26.8   BUN mg/dL 10 10 10   < > 11 6*   CREATININE mg/dL 0.39* 0.43* 0.44*   < > 0.50* 0.59*   CALCIUM mg/dL 8.1* 8.0* 8.0*   < > 8.0* 8.4*   BILIRUBIN mg/dL  --   --   --   --  1.0 1.4*   ALK PHOS U/L  --   --   --   --  215* 225*   ALT (SGPT) U/L  --   --   --   --   19 22   AST (SGOT) U/L  --   --   --   --  43* 42*   GLUCOSE mg/dL 165* 172* 224*   < > 212* 177*    < > = values in this interval not displayed.     Results from last 7 days   Lab Units 01/27/25  0508 01/26/25  0536 01/25/25  0613   MAGNESIUM mg/dL 1.8 1.8 1.8   PHOSPHORUS mg/dL 2.5 2.5 2.1*   HEMOGLOBIN g/dL 10.4* 10.2* 10.2*   HEMATOCRIT % 35.1* 34.3* 33.8*     COVID19   Date Value Ref Range Status   01/13/2025 Not Detected Not Detected - Ref. Range Final     Lab Results   Component Value Date    HGBA1C 4.40 (L) 08/28/2024         Pertinent Medications Reviewed.     Malnutrition Severity Assessment      Patient meets criteria for : Severe Malnutrition         Nutrition Diagnosis         Nutrition Dx Problem 1 Severe malnutrition related to Inability to consume sufficient energy as evidenced by inadequate energy intake., decreased appetite., body composition changes., patient report., impaired skin integrity., and edema masking potential dry wt loss     Nutrition Intervention           Current Nutrition Orders & Evaluation of Intake       Current PO Diet Diet: Diabetic, High Protein; Consistent Carbohydrate; Fluid Consistency: Thin (IDDSI 0)   Supplement Orders Placed This Encounter      Patient is on Glucommander      Dietary Nutrition Supplements Boost Glucose Control (Glucerna Shake); chocolate           Nutrition Intervention/Prescription        Continue carb controlled diet.  Suggest MVM, Vit C- for wound healing.  Boost GC BID.        Medical Nutrition Therapy/Nutrition Education          Learner     Readiness Patient  Acceptance     Method     Response Explanation  Verbalizes understanding     Monitor/Evaluation        Monitor Per protocol, PO intake, Supplement intake, Pertinent labs, Skin status     Nutrition Discharge Plan         Recommend to continue oral nutrition supplements on discharge.      Electronically signed by:  Aileen Polanco RD  01/27/25 08:37 EST

## 2025-01-27 NOTE — PLAN OF CARE
Goal Outcome Evaluation:              Outcome Evaluation: AAOx4, VSS, no significant changes. Imodium started, diarrhea still consistent.

## 2025-01-27 NOTE — THERAPY EVALUATION
Patient Name: Jose Shaikh  : 1960    MRN: [id number]                              Today's Date: 2025       Admit Date: 2025    Visit Dx:     ICD-10-CM ICD-9-CM   1. Impaired mobility and ADLs  Z74.09 V49.89    Z78.9    2. Difficulty in walking  R26.2 719.7   3. Multiple wounds of skin  T14.8XXA 959.8   4. Obesity, unspecified class, unspecified obesity type, unspecified whether serious comorbidity present  E66.9 278.00   5. Difficulty walking  R26.2 719.7     Patient Active Problem List   Diagnosis    Diabetic ulcer of left heel associated with type 2 DM    Acute osteomyelitis of left calcaneus     Diabetic ulcer of left heel associated with type 2 DM    Diabetic ulcer of right midfoot associated with type 2 DM    Paroxysmal atrial fibrillation    Essential hypertension    Hyperlipidemia LDL goal <100    Cellulitis and abscess of foot    High alkaline phosphatase level    Osteomyelitis    Onychomycosis    Onychocryptosis    Foot pain, bilateral    Osteomyelitis of foot, right, acute    Cellulitis of right foot    Type 2 diabetes mellitus, with long-term current use of insulin    Class 3 severe obesity due to excess calories with serious comorbidity and body mass index (BMI) of 45.0 to 49.9 in adult    Anxiety disorder, unspecified    Claustrophobia    Dependence on wheelchair    Depression, unspecified    Long term (current) use of anticoagulants    Long term (current) use of oral hypoglycemic drugs    Wound of foot    Ulcer of right foot    Orthostatic hypotension    Other chronic osteomyelitis, right ankle and foot    Personal history of nicotine dependence    Thrombocytopenia, unspecified    Unspecified open wound, right foot, initial encounter    Diabetic foot infection    Subacute osteomyelitis of right foot    Right foot pain    Sepsis    Onychomycosis    Foot pain, left    COVID-19 virus detected    Absence of toe of right foot    Corns and callosity    Disability of walking    Fracture     Limb swelling    Polyneuropathy    Pressure ulcer, stage 1    Shortness of breath    Generalized weakness    Debility    Onychomycosis    Noncompliance of patient with dietary regimen    Morbid obesity    COVID-19 virus infection    COVID-19    CHF exacerbation    Unable to care for self    Severe protein-calorie malnutrition     Past Medical History:   Diagnosis Date    Absence of toe of right foot     Acute osteomyelitis of left calcaneus  08/18/2021    Anxiety and depression     Arthritis     Cancer     Chronic pain     STATES HIS PAIN IS 10/10 AAT    Claustrophobia     Corns and callus     Diabetic ulcer of left heel associated with type 2 DM 08/18/2021    Diabetic ulcer of left heel associated with type 2 DM 07/06/2021    Diabetic ulcer of right midfoot associated with type 2 DM 08/18/2021    Difficulty walking     Essential hypertension 08/31/2021    Hammertoe     Hyperlipidemia LDL goal <100 08/31/2021    Ingrown toenail     Obesity     Paroxysmal atrial fibrillation 08/31/2021    Polyneuropathy     Pressure ulcer, stage 1     Tinea unguium     Type 2 diabetes mellitus with polyneuropathy      Past Surgical History:   Procedure Laterality Date    CYST REMOVAL      center of back; benign    EYE SURGERY      INCISION AND DRAINAGE ABSCESS      back    INCISION AND DRAINAGE LEG Right 12/10/2021    Procedure: INCISION AND DRAINAGE LOWER EXTREMITY;  Surgeon: Ash Leyva DPM;  Location: [facility] OR;  Service: Podiatry;  Laterality: Right;    OTHER SURGICAL HISTORY      Surgical clips left foot    TOE SURGERY Right     Removal of 5th toe    TRANS METATARSAL AMPUTATION Right 12/02/2021    Procedure: AMPUTATION TRANS METATARSAL;  Surgeon: Ash Leyva DPM;  Location: [facility] OR;  Service: Podiatry;  Laterality: Right;    VASCULAR SURGERY      WRIST SURGERY Left     repair of injury      General Information       Row Name 01/27/25 1356          OT Time and Intention    Document Type  re-evaluation  -     Mode of Treatment individual therapy;occupational therapy  -     Patient Effort adequate  -       Row Name 01/27/25 [facility]          General Information    Patient Profile Reviewed yes  -     Prior Level of Function max assist:;ADL's;transfer  -     Existing Precautions/Restrictions fall  -     Barriers to Rehab none identified  -       Row Name 01/27/25 [facility]          Occupational Profile    Reason for Services/Referral (Occupational Profile) Pt. is a 64year old male admitted for the above diagnosis. Pt. referred to OT services to assess independence with ADLs and adl transfers/fx'l mobility. No previous OT services for current condition.  -       Row Name 01/27/25 [facility]          Living Environment    People in Home alone  -       Row Name 01/27/25 [facility]          Cognition    Orientation Status (Cognition) oriented x 4  -       Row Name 01/27/25 [facility]          Safety Issues/Impairments Affecting Functional Mobility    Impairments Affecting Function (Mobility) balance;pain;range of motion (ROM);strength;endurance/activity tolerance  -               User Key  (r) = Recorded By, (t) = Taken By, (c) = Cosigned By      Initials Name Provider Type    AC Uyen Zazueta OT Occupational Therapist                     Mobility/ADL's       Row Name 01/27/25 [facility]          Bed Mobility    Bed Mobility bed mobility (all) activities  -     All Activities, [place] (Bed Mobility) dependent (less than 25% patient effort);maximum assist (25% patient effort)  -     Comment, (Bed Mobility) patient unable to tolerate BLE movement for sitting on EOB.  Patient requesting pain meds and nursing notified.  -       Row Name 01/27/25 [facility]          Transfers    Comment, (Transfers) not tested due to assist level for bed mobility  -       Row Name 01/27/25 [facility]          Activities of Daily Living    BADL Assessment/Intervention --  Patient is set up for self-feeding, set up for grooming,  max/dependent for bathing/dressing, max/dependent for toileting  -AC               User Key  (r) = Recorded By, (t) = Taken By, (c) = Cosigned By      Initials Name Provider Type    Uyen Nguyen OT Occupational Therapist                   Obj/Interventions       Row Name 01/27/25 1358          Sensory Assessment (Somatosensory)    Sensory Assessment (Somatosensory) UE sensation intact  -       Row Name 01/27/25 1358          Vision Assessment/Intervention    Visual Impairment/Limitations WFL  -AC       Row Name 01/27/25 1358          Range of Motion Comprehensive    General Range of Motion bilateral upper extremity ROM WFL  -AC       Row Name 01/27/25 1358          Strength Comprehensive (MMT)    Comment, General Manual Muscle Testing (MMT) Assessment BUE 4/5  -       Row Name 01/27/25 1358          Motor Skills    Motor Skills coordination;functional endurance  -AC     Coordination WFL  -     Functional Endurance poor  -       Row Name 01/27/25 1358          Balance    Comment, Balance patient unable to tolerate this date.  -AC               User Key  (r) = Recorded By, (t) = Taken By, (c) = Cosigned By      Initials Name Provider Type    yUen Nguyen OT Occupational Therapist                   Goals/Plan       Row Name 01/27/25 1402          Bed Mobility Goal 1 (OT)    Activity/Assistive Device (Bed Mobility Goal 1, OT) bed mobility activities, all  -AC     [place] Level/Cues Needed (Bed Mobility Goal 1, OT) minimum assist (75% or more patient effort)  -AC     Time Frame (Bed Mobility Goal 1, OT) long term goal (LTG);10 days  -       Row Name 01/27/25 1402          Transfer Goal 1 (OT)    Activity/Assistive Device (Transfer Goal 1, OT) transfers, all  -AC     [place] Level/Cues Needed (Transfer Goal 1, OT) minimum assist (75% or more patient effort)  -AC     Time Frame (Transfer Goal 1, OT) long term goal (LTG);10 days  -       Row Name 01/27/25 1402          Bathing Goal 1 (OT)     Activity/Device (Bathing Goal 1, OT) bathing skills, all  -AC     [place] Level/Cues Needed (Bathing Goal 1, OT) minimum assist (75% or more patient effort)  -AC     Time Frame (Bathing Goal 1, OT) long term goal (LTG);10 days  -AC       Row Name 01/27/25 1402          Dressing Goal 1 (OT)    Activity/Device (Dressing Goal 1, OT) dressing skills, all  -AC     [place]/Cues Needed (Dressing Goal 1, OT) minimum assist (75% or more patient effort)  -AC     Time Frame (Dressing Goal 1, OT) long term goal (LTG);10 days  -AC       Row Name 01/27/25 1402          Toileting Goal 1 (OT)    Activity/Device (Toileting Goal 1, OT) toileting skills, all  -AC     [place] Level/Cues Needed (Toileting Goal 1, OT) minimum assist (75% or more patient effort)  -AC     Time Frame (Toileting Goal 1, OT) long term goal (LTG);10 days  -AC       Row Name 01/27/25 1402          Strength Goal 1 (OT)    Strength Goal 1 (OT) patient will improve BUE strenght to 5/5 for adls.  -AC     Time Frame (Strength Goal 1, OT) long term goal (LTG);10 days  -AC       Row Name 01/27/25 1402          Problem Specific Goal 1 (OT)    Problem Specific Goal 1 (OT) Patient will demosntrate fair activity tolerance for adls.  -AC     Time Frame (Problem Specific Goal 1, OT) long term goal (LTG);10 days  -AC       Row Name 01/27/25 1402          Therapy Assessment/Plan (OT)    Planned Therapy Interventions (OT) activity tolerance training;occupation/activity based interventions;functional balance retraining;ROM/therapeutic exercise;transfer/mobility retraining;patient/caregiver education/training;BADL retraining  -AC               User Key  (r) = Recorded By, (t) = Taken By, (c) = Cosigned By      Initials Name Provider Type    Uyen Nguyen, OT Occupational Therapist                   Clinical Impression       Row Name 01/27/25 1400          Pain Assessment    Pretreatment Pain Rating 2/10  -AC     Posttreatment Pain Rating 2/10  -AC     Pain  Location extremity  -AC     Pain Side/Orientation left;lower  -AC     Pre/Posttreatment Pain Comment nursing notified  -       Row Name 01/27/25 1400          Plan of Care Review    Plan of Care Reviewed With patient  -     Progress no change  -     Outcome Evaluation Patient presents with balance, strength, endurance limitations that impede his/her ability to perform ADLS. The skills of a therapist are necessary to maximize independence with ADLs.  -       Row Name 01/27/25 1400          Therapy Assessment/Plan (OT)    Patient/Family Therapy Goal Statement (OT) none stated.  -AC     Rehab Potential (OT) fair  -     Criteria for Skilled Therapeutic Interventions Met (OT) yes;meets criteria;skilled treatment is necessary  -     Therapy Frequency (OT) 5 times/wk  -       Row Name 01/27/25 1400          Therapy Plan Review/Discharge Plan (OT)    Equipment Needs Upon Discharge (OT) walker, rolling  -AC     Anticipated Discharge Disposition (OT) sub acute care setting  -       Row Name 01/27/25 1400          Positioning and Restraints    Pre-Treatment Position in bed  -AC     Post Treatment Position bed  -AC     In Bed fowlers;encouraged to call for assist;call light within reach;exit alarm on  -AC               User Key  (r) = Recorded By, (t) = Taken By, (c) = Cosigned By      Initials Name Provider Type    AC Uyen Zazueta, OT Occupational Therapist                   Outcome Measures       Row Name 01/27/25 1403          How much help from another is currently needed...    Putting on and taking off regular lower body clothing? 1  -AC     Bathing (including washing, rinsing, and drying) 2  -AC     Toileting (which includes using toilet bed pan or urinal) 1  -AC     Putting on and taking off regular upper body clothing 2  -AC     Taking care of personal grooming (such as brushing teeth) 4  -AC     Eating meals 4  -AC     AM-PAC 6 Clicks Score (OT) 14  -       Row Name 01/27/25 1200 01/27/25 0802        How much help from another person do you currently need...    Turning from your back to your side while in flat bed without using bedrails? 2  -DP 2  -DD    Moving from lying on back to sitting on the side of a flat bed without bedrails? 1  -DP 1  -DD    Moving to and from a bed to a chair (including a wheelchair)? 1  -DP 1  -DD    Standing up from a chair using your arms (e.g., wheelchair, bedside chair)? 1  -DP 1  -DD    Climbing 3-5 steps with a railing? 1  -DP 1  -DD    To walk in hospital room? 1  -DP 1  -DD    AM-PAC 6 Clicks Score (PT) 7  -DP 7  -DD    Highest Level of Mobility Goal 2 --> Bed activities/dependent transfer  -DP 2 --> Bed activities/dependent transfer  -DD      Row Name 01/27/25 1403 01/27/25 1200       Functional Assessment    Outcome Measure Options AM-PAC 6 Clicks Daily Activity (OT);Optimal Instrument  -AC AM-PAC 6 Clicks Basic Mobility (PT)  -DP      Row Name 01/27/25 1403          Optimal Instrument    Optimal Instrument Optimal - 3  -AC     Bending/Stooping 5  -AC     Standing 5  -AC     Reaching 1  -AC               User Key  (r) = Recorded By, (t) = Taken By, (c) = Cosigned By      Initials Name Provider Type    Uyen Nguyen, OT Occupational Therapist    Deedee Dove, PT Physical Therapist    DD Mae Francisco, RN Registered Nurse                    Occupational Therapy Education       Title: PT OT SLP Therapies (Done)       Topic: Occupational Therapy (Done)       Point: ADL training (Done)       Description:   Instruct learner(s) on proper safety adaptation and remediation techniques during self care or transfers.   Instruct in proper use of assistive devices.                  Learning Progress Summary            Patient Acceptance, E, VU by  at 1/27/2025 1404                      Point: Home exercise program (Done)       Description:   Instruct learner(s) on appropriate technique for monitoring, assisting and/or progressing therapeutic exercises/activities.                   Learning Progress Summary            Patient Acceptance, E, VU by  at 1/27/2025 1404                      Point: Precautions (Done)       Description:   Instruct learner(s) on prescribed precautions during self-care and functional transfers.                  Learning Progress Summary            Patient Acceptance, E, VU by  at 1/27/2025 1404                      Point: Body mechanics (Done)       Description:   Instruct learner(s) on proper positioning and spine alignment during self-care, functional mobility activities and/or exercises.                  Learning Progress Summary            Patient Acceptance, E, VU by  at 1/27/2025 1404                                      User Key       Initials Effective Dates Name Provider Type Discipline     06/16/21 -  Uyen Zazueta OT Occupational Therapist OT                  OT Recommendation and Plan  Planned Therapy Interventions (OT): activity tolerance training, occupation/activity based interventions, functional balance retraining, ROM/therapeutic exercise, transfer/mobility retraining, patient/caregiver education/training, BADL retraining  Therapy Frequency (OT): 5 times/wk  Plan of Care Review  Plan of Care Reviewed With: patient  Progress: no change  Outcome Evaluation: Patient presents with balance, strength, endurance limitations that impede his/her ability to perform ADLS. The skills of a therapist are necessary to maximize independence with ADLs.     Time Calculation:   Evaluation Complexity (OT)  Review Occupational Profile/Medical/Therapy History Complexity: brief/low complexity  Assessment, Occupational Performance/Identification of Deficit Complexity: 1-3 performance deficits  Clinical Decision Making Complexity (OT): problem focused assessment/low complexity  Overall Complexity of Evaluation (OT): low complexity     Time Calculation- OT       Row Name 01/27/25 1405             Time Calculation- OT    OT Received On 01/27/25  -      OT Goal  Re-Cert Due Date 02/05/25  -AC         Untimed Charges    OT Eval/Re-eval Minutes 26  -AC         Total Minutes    Untimed Charges Total Minutes 26  -AC       Total Minutes 26  -AC                User Key  (r) = Recorded By, (t) = Taken By, (c) = Cosigned By      Initials Name Provider Type    Uyen Nguyen OT Occupational Therapist                  Therapy Charges for Today       Code Description Service Date Service Provider Modifiers Qty    92114695774 HC OT EVAL LOW COMPLEXITY 2 1/27/2025 Uyen Zazueta OT GO 1                 Uyen Zazueta OT  1/27/2025

## 2025-01-27 NOTE — PROGRESS NOTES
[facility]   Hospitalist Progress Note  Date: 2025  Patient Name: Jose Shaikh  : 1960  MRN: [id number]  Date of admission: 2025  Room/Bed: 3021/1      Subjective   Subjective     Chief Complaint: unable to take care of self     Summary:Jose Shaikh is a 64 y.o. male  with history of chronic diastolic congestive heart failure, paroxysmal A-fib on Eliquis, type 2 diabetes, morbid obesity, debility with wheelchair dependence, osteoarthritis of hip, chronic venous stasis, deconditioning, hemorrhoids and anasarca who presented to ER after not being able to take care of himself at home.  Patient reported that he had been laying in his own feces/urine for last several days.  He lives alone at home and came to the ER for cleanup.  His potassium was low when he presented to ER which was repleted.  Vital stable.  ER  was involved as patient is not interested in going to nursing home in [place].   contacted [facility] nursing and rehab; patient agreeable to start the referral.  Admitted for pending discharge to nursing home.  No other active issues.  Patient was laying in bed during my evaluation and was getting clean.  No active issues.  Wound care on board. Pending disposition.    Interval Followup: No acute events overnight. On Orajel as needed for mucositis.  No other active complaints.     Review of Systems    All systems reviewed and negative except for what is outlined above.      Objective   Objective     Vitals:   Temp:  [97.3 °F (36.3 °C)-98.3 °F (36.8 °C)] 98.1 °F (36.7 °C)  Heart Rate:  [87-93] 91  Resp:  [16-18] 18  BP: (106-132)/(63-87) 125/77    Physical Exam   General: Awake, alert, NAD  Cardiovascular: RRR, no murmurs   Pulmonary: CTA bilaterally; no wheezes; no conversational dyspnea  Gastrointestinal: S/ND/NT, +BS  Neuro: alert, awake, oriented x 3; speech clear; no tremor      Result Review    Result Review:  I have personally reviewed these  results:  [x]  Laboratory      Lab 01/27/25  0508 01/26/25  0536 01/25/25  0613   WBC 4.30 3.93 4.29   HEMOGLOBIN 10.4* 10.2* 10.2*   HEMATOCRIT 35.1* 34.3* 33.8*   PLATELETS 116* 118* 125*   NEUTROS ABS 2.44 2.24 2.60   IMMATURE GRANS (ABS) 0.01 0.01 0.02   LYMPHS ABS 1.15 1.04 1.01   MONOS ABS 0.50 0.49 0.51   EOS ABS 0.14 0.11 0.10   MCV 71.5* 71.0* 70.1*         Lab 01/27/25  0508 01/26/25  0536 01/25/25  0613   SODIUM 132* 132* 132*   POTASSIUM 3.4* 3.5 3.6   CHLORIDE 99 98 98   CO2 25.6 26.5 25.6   ANION GAP 7.4 7.5 8.4   BUN 10 10 10   CREATININE 0.39* 0.43* 0.44*   EGFR 121.3 117.7 116.9   GLUCOSE 165* 172* 224*   CALCIUM 8.1* 8.0* 8.0*   MAGNESIUM 1.8 1.8 1.8   PHOSPHORUS 2.5 2.5 2.1*         Lab 01/23/25  0605 01/21/25  1707   TOTAL PROTEIN 5.7* 7.0   ALBUMIN 2.6* 3.1*   GLOBULIN  --  3.9   ALT (SGPT) 19 22   AST (SGOT) 43* 42*   BILIRUBIN 1.0 1.4*   INDIRECT BILIRUBIN 0.6  --    BILIRUBIN DIRECT 0.4*  --    ALK PHOS 215* 225*                     Brief Urine Lab Results  (Last result in the past 365 days)        Color   Clarity   Blood   Leuk Est   Nitrite   Protein   CREAT   Urine HCG        01/21/25 1703 Dark Yellow   Clear   Negative   Small (1+)   Negative   Negative                 [x]  Microbiology   Microbiology Results (last 10 days)       Procedure Component Value - Date/Time    Clostridioides difficile Toxin - Stool, Per Rectum [[id number]]  (Abnormal) Collected: 01/22/25 1517    Lab Status: Final result Specimen: Stool from Per Rectum Updated: 01/22/25 1621    Narrative:      The following orders were created for panel order Clostridioides difficile Toxin - Stool, Per Rectum.  Procedure                               Abnormality         Status                     ---------                               -----------         ------                     Clostridioides difficile...[[id number]]  Abnormal            Final result                 Please view results for these tests on the individual orders.     Clostridioides difficile Toxin, PCR - Stool, Per Rectum [[id number]]  (Abnormal) Collected: 01/22/25 1517    Lab Status: Final result Specimen: Stool from Per Rectum Updated: 01/22/25 1621     Toxigenic C. difficile by PCR Positive     027 Toxin Presumptive Negative    Narrative:      DNA from a toxigenic strain of C.difficile has been detected. Antigen testing for the presence of free C.difficile toxin is currently in progress, to help determine the clinical significance of this PCR result.     Clostridioides difficile toxin Ag, Reflex - Stool, Per Rectum [[id number]]  (Normal) Collected: 01/22/25 1517    Lab Status: Final result Specimen: Stool from Per Rectum Updated: 01/22/25 1645     C.diff Toxin Ag Negative    Narrative:      DNA from a toxigenic strain of C.difficile was detected, although the free toxin itself was not detected. These findings are consistent with C.difficile colonization and may not reflect actual C.difficile infection. Clinical correlation needed.    Urine Culture - Urine, Urine, Clean Catch [[id number]] Collected: 01/21/25 1703    Lab Status: Final result Specimen: Urine, Clean Catch Updated: 01/22/25 1141     Urine Culture 25,000 CFU/mL Normal Urogenital Berenice    Narrative:      Colonization of the urinary tract without infection is common. Treatment is discouraged unless the patient is symptomatic, pregnant, or undergoing an invasive urologic procedure.          [x]  Radiology  XR Chest 1 View    Result Date: 1/21/2025  Impression: An acute pulmonary process is not apparent. Electronically Signed: Jorge Urban MD  1/21/2025 4:22 PM EST  Workstation ID: [id number]   []  EKG/Telemetry   []  Cardiology/Vascular   []  Pathology  []  Old records  []  Other:    Assessment & Plan   Assessment / Plan     Assessment:  Unable to take care of himself at home  History of chronic diastolic congestive heart failure  paroxysmal A-fib on Eliquis  Type 2 diabetes  Morbid obesity  Debility with wheelchair  dependence  Osteoarthritis of hip  Chronic venous stasis  Deconditioning  Hemorrhoids   Anasarca    Plan:  Patient is being managed in outpatient bed.  Presented after not being able to take care of himself and had been laying in feces/urine for past 3 days.  Patient was cleaned and  working on his disposition.  Referral sent to [facility] nursing and rehab at [place].  Pending disposition.  Home medication reconciled and restarted.    Found to have C diff colonization; negative for toxin. Has history of C. difficile colonization.  No leukocytosis and renal function stable.   Started on as needed Imodium for diarrhea.  Seems chronic.  On as needed baclofen for muscle spasm.  On PRN Norco for pain every 6 hours.  On Orajel as needed for mouth pain.  No signs of infection to my evaluation.  Continue rest of current management.  Lab work in AM.  Patient to be discharged home if he refuses rehab placement.  Pending disposition.     Discussed with RN.    VTE Prophylaxis:  Pharmacologic VTE prophylaxis orders are present.        CODE STATUS:   Code Status (Patient has no pulse and is not breathing): CPR (Attempt to Resuscitate)  Medical Interventions (Patient has pulse or is breathing): Full Support      Electronically signed by Janis Lam MD, 01/27/25, 8:21 AM EST.

## 2025-01-27 NOTE — PLAN OF CARE
Goal Outcome Evaluation:  Plan of Care Reviewed With: patient           Outcome Evaluation: Pt presents with strength, ROM, bed mobility and balance impairments. He Pt will benefit from skilled PT servies to improve strength, bed mobility, balance, transfers and WC moblity, to maximize independence with functional mobility for optimal transition into new home environment.    Anticipated Discharge Disposition (PT): sub acute care setting

## 2025-01-27 NOTE — THERAPY EVALUATION
Acute Care - Physical Therapy Initial Evaluation   [facility]     Patient Name: Jose Shaikh  : 1960  MRN: [id number]  Today's Date: 2025      Visit Dx:     ICD-10-CM ICD-9-CM   1. Impaired mobility and ADLs  Z74.09 V49.89    Z78.9    2. Difficulty in walking  R26.2 719.7   3. Multiple wounds of skin  T14.8XXA 959.8   4. Obesity, unspecified class, unspecified obesity type, unspecified whether serious comorbidity present  E66.9 278.00   5. Difficulty walking  R26.2 719.7     Patient Active Problem List   Diagnosis    Diabetic ulcer of left heel associated with type 2 DM    Acute osteomyelitis of left calcaneus     Diabetic ulcer of left heel associated with type 2 DM    Diabetic ulcer of right midfoot associated with type 2 DM    Paroxysmal atrial fibrillation    Essential hypertension    Hyperlipidemia LDL goal <100    Cellulitis and abscess of foot    High alkaline phosphatase level    Osteomyelitis    Onychomycosis    Onychocryptosis    Foot pain, bilateral    Osteomyelitis of foot, right, acute    Cellulitis of right foot    Type 2 diabetes mellitus, with long-term current use of insulin    Class 3 severe obesity due to excess calories with serious comorbidity and body mass index (BMI) of 45.0 to 49.9 in adult    Anxiety disorder, unspecified    Claustrophobia    Dependence on wheelchair    Depression, unspecified    Long term (current) use of anticoagulants    Long term (current) use of oral hypoglycemic drugs    Wound of foot    Ulcer of right foot    Orthostatic hypotension    Other chronic osteomyelitis, right ankle and foot    Personal history of nicotine dependence    Thrombocytopenia, unspecified    Unspecified open wound, right foot, initial encounter    Diabetic foot infection    Subacute osteomyelitis of right foot    Right foot pain    Sepsis    Onychomycosis    Foot pain, left    COVID-19 virus detected    Absence of toe of right foot    Corns and callosity    Disability of walking     Fracture    Limb swelling    Polyneuropathy    Pressure ulcer, stage 1    Shortness of breath    Generalized weakness    Debility    Onychomycosis    Noncompliance of patient with dietary regimen    Morbid obesity    COVID-19 virus infection    COVID-19    CHF exacerbation    Unable to care for self    Severe protein-calorie malnutrition     Past Medical History:   Diagnosis Date    Absence of toe of right foot     Acute osteomyelitis of left calcaneus  08/18/2021    Anxiety and depression     Arthritis     Cancer     Chronic pain     STATES HIS PAIN IS 10/10 AAT    Claustrophobia     Corns and callus     Diabetic ulcer of left heel associated with type 2 DM 08/18/2021    Diabetic ulcer of left heel associated with type 2 DM 07/06/2021    Diabetic ulcer of right midfoot associated with type 2 DM 08/18/2021    Difficulty walking     Essential hypertension 08/31/2021    Hammertoe     Hyperlipidemia LDL goal <100 08/31/2021    Ingrown toenail     Obesity     Paroxysmal atrial fibrillation 08/31/2021    Polyneuropathy     Pressure ulcer, stage 1     Tinea unguium     Type 2 diabetes mellitus with polyneuropathy      Past Surgical History:   Procedure Laterality Date    CYST REMOVAL      center of back; benign    EYE SURGERY      INCISION AND DRAINAGE ABSCESS      back    INCISION AND DRAINAGE LEG Right 12/10/2021    Procedure: INCISION AND DRAINAGE LOWER EXTREMITY;  Surgeon: Ash Leyva DPM;  Location: [facility];  Service: Podiatry;  Laterality: Right;    OTHER SURGICAL HISTORY      Surgical clips left foot    TOE SURGERY Right     Removal of 5th toe    TRANS METATARSAL AMPUTATION Right 12/02/2021    Procedure: AMPUTATION TRANS METATARSAL;  Surgeon: Ash Leyva DPM;  Location: [facility] OR;  Service: Podiatry;  Laterality: Right;    VASCULAR SURGERY      WRIST SURGERY Left     repair of injury     PT Assessment (Last 12 Hours)       PT Evaluation and Treatment       Row Name 01/27/25 1100           Physical Therapy Time and Intention    Subjective Information complains of;pain  R hip  -DP     Document Type evaluation  -DP     Mode of Treatment individual therapy;physical therapy  -DP     Patient Effort good  -DP       Row Name 01/27/25 1100          General Information    Patient Observations alert;cooperative;agree to therapy  -DP     General Observations of Patient Pt lived at home and had caregiver who assisted him with ADLs nd transfers.  -DP     Prior Level of Function max assist:;transfer;bed mobility;ADL's  -DP     Equipment Currently Used at Home hospital bed  -DP     Existing Precautions/Restrictions fall  -DP       Row Name 01/27/25 1100          Living Environment    Home Accessibility wheelchair accessible  -DP     People in Home alone  -DP     Primary Care Provided by homecare agency  -DP       Row Name 01/27/25 1100          Cognition    Orientation Status (Cognition) oriented x 4  -DP       Row Name 01/27/25 1100          Range of Motion (ROM)    Range of Motion bilateral lower extremities  -DP       Row Name 01/27/25 1100          Range of Motion Comprehensive    Comment, General Range of Motion RLE:hip- flexion limited to50 degrees- due to pain, swelling adn body habitus,RLE knee- 0 to 40 degrees, RLE ankle:NT- transmetarsal amputation. LLE hip: contracted in external rotation. LLE knee: 10-30 degrees, LLE ankle: DF limited to neutral  -DP       Row Name 01/27/25 1100          Strength (Manual Muscle Testing)    Strength (Manual Muscle Testing) bilateral lower extremities  RLE: 3/5, LLE: 2-/5  -DP       Row Name 01/27/25 1100          Bed Mobility    Bed Mobility supine-sit;rolling left;rolling right  -DP     Rolling Left [place] (Bed Mobility) moderate assist (50% patient effort)  Pt assisted by puling on the side rails  -DP     Rolling Right [place] (Bed Mobility) maximum assist (25% patient effort)  -DP     Supine-Sit [place] (Bed Mobility) maximum assist (25%  patient effort)  pt was not able to come all the way but he was able to assist by pulling on the trapeze bar partially  -DP       Row Name 01/27/25 1100          Transfers    Comment, (Transfers) not tested- deferred for safety  -DP       Row Name 01/27/25 1100          Gait/Stairs (Locomotion)    Comment, (Gait/Stairs) Pt is non ambulatory at baseline  -DP       Row Name 01/27/25 1100          Safety Issues/Impairments Affecting Functional Mobility    Impairments Affecting Function (Mobility) balance;pain;range of motion (ROM);strength;endurance/activity tolerance  -DP       Row Name             Wound 01/22/25 1603 Bilateral gluteal blisters    Wound - Properties Group Placement Date: 01/22/25  -MB Placement Time: 1603  -MB Side: Bilateral  -MB Location: gluteal  -MB Primary Wound Type: Other  -MB, serous blisters  Type: blisters  -MB    Retired Wound - Properties Group Placement Date: 01/22/25  -MB Placement Time: 1603  -MB Side: Bilateral  -MB Location: gluteal  -MB Primary Wound Type: Other  -MB, serous blisters  Type: blisters  -MB    Retired Wound - Properties Group Placement Date: 01/22/25  -MB Placement Time: 1603  -MB Side: Bilateral  -MB Location: gluteal  -MB Primary Wound Type: Other  -MB, serous blisters  Type: blisters  -MB    Retired Wound - Properties Group Date first assessed: 01/22/25  -MB Time first assessed: 1603  -MB Side: Bilateral  -MB Location: gluteal  -MB Primary Wound Type: Other  -MB, serous blisters  Type: blisters  -MB      Row Name             Wound 12/19/24 0259 Right anterior foot    Wound - Properties Group Placement Date: 12/19/24  -KH Placement Time: 0259 -KH Side: Right  -KH Orientation: anterior  -KH Location: foot  -KH    Retired Wound - Properties Group Placement Date: 12/19/24  -KH Placement Time: 0259 -KH Side: Right  -KH Orientation: anterior  -KH Location: foot  -KH    Retired Wound - Properties Group Placement Date: 12/19/24  -KH Placement Time: 0259 -KH Side: Right   -KH Orientation: anterior  -KH Location: foot  -KH    Retired Wound - Properties Group Date first assessed: 12/19/24  -KH Time first assessed: 0259  - Side: Right  - Location: foot  -KH      Row Name 01/27/25 1100          Plan of Care Review    Plan of Care Reviewed With patient  -DP     Outcome Evaluation Pt presents with strength, ROM, bed mobility and balance impairments. He Pt will benefit from skilled PT servies to improve strength, bed mobility, balance, transfers and WC moblity, to maximize independence with functional mobility for optimal transition into new home environment.  -DP       Row Name 01/27/25 1100          Therapy Assessment/Plan (PT)    Criteria for Skilled Interventions Met (PT) yes;meets criteria  -DP     Therapy Frequency (PT) daily  -DP     Predicted Duration of Therapy Intervention (PT) 10 days  -DP     Problem List (PT) problems related to;balance;mobility;range of motion (ROM);strength  -DP     Activity Limitations Related to Problem List (PT) unable to transfer safely  -DP       Row Name 01/27/25 1100          PT Evaluation Complexity    History, PT Evaluation Complexity 1-2 personal factors and/or comorbidities  -DP     Examination of Body Systems (PT Eval Complexity) total of 4 or more elements  -DP     Clinical Presentation (PT Evaluation Complexity) stable  -DP     Clinical Decision Making (PT Evaluation Complexity) low complexity  -DP     Overall Complexity (PT Evaluation Complexity) low complexity  -DP       Row Name 01/27/25 1100          Physical Therapy Goals    Bed Mobility Goal Selection (PT) bed mobility, PT goal 1  -DP     Strength Goal Selection (PT) strength, PT goal 1  -DP       Row Name 01/27/25 1100          Bed Mobility Goal 1 (PT)    Activity/Assistive Device (Bed Mobility Goal 1, PT) sit to supine/supine to sit  -DP     [place] Level/Cues Needed (Bed Mobility Goal 1, PT) moderate assist (50-74% patient effort)  -DP     Time Frame (Bed Mobility Goal 1, PT)  10 days  -DP       Row Name 01/27/25 1100          Strength Goal 1 (PT)    Strength Goal 1 (PT) Pt will demonstrate improvement with LLE strength to 3/5  -DP     Time Frame (Strength Goal 1, PT) 10 days  -DP               User Key  (r) = Recorded By, (t) = Taken By, (c) = Cosigned By      Initials Name Provider Type    Ange Larsen, RN Registered Nurse    Deedee Dove, PT Physical Therapist    Justice Cruz RN Registered Nurse                    Physical Therapy Education        No education to display                  PT Recommendation and Plan  Anticipated Discharge Disposition (PT): sub acute care setting  Planned Therapy Interventions (PT): bed mobility training, balance training, ROM (range of motion), strengthening, transfer training, wheelchair management/propulsion training  Therapy Frequency (PT): daily  Plan of Care Reviewed With: patient  Outcome Evaluation: Pt presents with strength, ROM, bed mobility and balance impairments. He Pt will benefit from skilled PT servies to improve strength, bed mobility, balance, transfers and WC moblity, to maximize independence with functional mobility for optimal transition into new home environment.   Outcome Measures       Row Name 01/27/25 1200             How much help from another person do you currently need...    Turning from your back to your side while in flat bed without using bedrails? 2  -DP      Moving from lying on back to sitting on the side of a flat bed without bedrails? 1  -DP      Moving to and from a bed to a chair (including a wheelchair)? 1  -DP      Standing up from a chair using your arms (e.g., wheelchair, bedside chair)? 1  -DP      Climbing 3-5 steps with a railing? 1  -DP      To walk in hospital room? 1  -DP      AM-PAC 6 Clicks Score (PT) 7  -DP         Functional Assessment    Outcome Measure Options AM-PAC 6 Clicks Basic Mobility (PT)  -DP                User Key  (r) = Recorded By, (t) = Taken By, (c) = Cosigned By       Initials Name Provider Type    Deedee Dove, PT Physical Therapist                     Time Calculation:    PT Charges       Row Name 01/27/25 1216             Time Calculation    PT Received On 01/27/25  -DP         Untimed Charges    PT Eval/Re-eval Minutes 40  -DP         Total Minutes    Untimed Charges Total Minutes 40  -DP       Total Minutes 40  -DP                User Key  (r) = Recorded By, (t) = Taken By, (c) = Cosigned By      Initials Name Provider Type    Deedee Dove, PT Physical Therapist                      PT G-Codes  Outcome Measure Options: AM-PAC 6 Clicks Basic Mobility (PT)  AM-PAC 6 Clicks Score (PT): 7  AM-PAC 6 Clicks Score (OT): 15    Deedee Landers PT  1/27/2025

## 2025-01-27 NOTE — PLAN OF CARE
Problem: Adult Inpatient Plan of Care  Goal: Plan of Care Review  Outcome: Progressing  Flowsheets (Taken 1/27/2025 1556)  Progress: no change  Outcome Evaluation: Patient is alert and oriented x4. Patient has had complaints of pain, see EMAR. BLE wrapped with kerlix and ACE wrap this shift. Patient has no complaints at this time.  Plan of Care Reviewed With: patient  Goal: Patient-Specific Goal (Individualized)  Outcome: Progressing  Goal: Absence of Hospital-Acquired Illness or Injury  Outcome: Progressing  Intervention: Identify and Manage Fall Risk  Recent Flowsheet Documentation  Taken 1/27/2025 1520 by Mae Francisco, RN  Safety Promotion/Fall Prevention:   assistive device/personal items within reach   clutter free environment maintained   fall prevention program maintained   lighting adjusted   nonskid shoes/slippers when out of bed   room organization consistent   safety round/check completed  Taken 1/27/2025 1304 by Mae Francisco, RN  Safety Promotion/Fall Prevention:   assistive device/personal items within reach   clutter free environment maintained   fall prevention program maintained   lighting adjusted   nonskid shoes/slippers when out of bed   room organization consistent   safety round/check completed  Taken 1/27/2025 1200 by Mae Francisco, RN  Safety Promotion/Fall Prevention:   assistive device/personal items within reach   clutter free environment maintained   fall prevention program maintained   lighting adjusted   nonskid shoes/slippers when out of bed   room organization consistent   safety round/check completed  Taken 1/27/2025 1005 by Mae Francisco, RN  Safety Promotion/Fall Prevention:   assistive device/personal items within reach   clutter free environment maintained   fall prevention program maintained   lighting adjusted   nonskid shoes/slippers when out of bed   room organization consistent   safety round/check completed  Taken 1/27/2025 0802 by Mae Francisco, RN  Safety  Promotion/Fall Prevention:   assistive device/personal items within reach   clutter free environment maintained   fall prevention program maintained   lighting adjusted   nonskid shoes/slippers when out of bed   room organization consistent   safety round/check completed  Taken 1/27/2025 0715 by Mae Francisco RN  Safety Promotion/Fall Prevention:   assistive device/personal items within reach   clutter free environment maintained   fall prevention program maintained   lighting adjusted   nonskid shoes/slippers when out of bed   room organization consistent   safety round/check completed  Intervention: Prevent Infection  Recent Flowsheet Documentation  Taken 1/27/2025 1520 by Mae Francisco RN  Infection Prevention:   cohorting utilized   environmental surveillance performed   equipment surfaces disinfected   hand hygiene promoted   personal protective equipment utilized   rest/sleep promoted   single patient room provided  Taken 1/27/2025 1304 by Mae Francisco RN  Infection Prevention:   cohorting utilized   environmental surveillance performed   equipment surfaces disinfected   hand hygiene promoted   personal protective equipment utilized   rest/sleep promoted   single patient room provided  Taken 1/27/2025 1200 by Mae Francisco RN  Infection Prevention:   cohorting utilized   environmental surveillance performed   equipment surfaces disinfected   hand hygiene promoted   personal protective equipment utilized   rest/sleep promoted   single patient room provided  Taken 1/27/2025 1005 by Mae Francisco RN  Infection Prevention:   cohorting utilized   environmental surveillance performed   equipment surfaces disinfected   hand hygiene promoted   personal protective equipment utilized   rest/sleep promoted   single patient room provided  Taken 1/27/2025 0802 by Mae Francisco RN  Infection Prevention:   cohorting utilized   environmental surveillance performed   equipment surfaces disinfected   hand  hygiene promoted   personal protective equipment utilized   rest/sleep promoted   single patient room provided  Taken 1/27/2025 0715 by Mae Francisco RN  Infection Prevention:   cohorting utilized   environmental surveillance performed   equipment surfaces disinfected   hand hygiene promoted   personal protective equipment utilized   rest/sleep promoted   single patient room provided  Goal: Optimal Comfort and Wellbeing  Outcome: Progressing  Intervention: Monitor Pain and Promote Comfort  Recent Flowsheet Documentation  Taken 1/27/2025 1520 by Mae Francisco RN  Pain Management Interventions:   care clustered   pain management plan reviewed with patient/caregiver   quiet environment facilitated   pain medication given  Taken 1/27/2025 0802 by Mae Francisco RN  Pain Management Interventions:   care clustered   pain management plan reviewed with patient/caregiver   pain medication given   quiet environment facilitated  Intervention: Provide Person-Centered Care  Recent Flowsheet Documentation  Taken 1/27/2025 0802 by Mae Francisco RN  Trust Relationship/Rapport:   care explained   choices provided   emotional support provided   empathic listening provided   questions answered   questions encouraged   reassurance provided   thoughts/feelings acknowledged  Goal: Readiness for Transition of Care  Outcome: Progressing     Problem: Fall Injury Risk  Goal: Absence of Fall and Fall-Related Injury  Outcome: Progressing  Intervention: Identify and Manage Contributors  Recent Flowsheet Documentation  Taken 1/27/2025 0802 by Mae Francisco RN  Medication Review/Management:   medications reviewed   high-risk medications identified  Intervention: Promote Injury-Free Environment  Recent Flowsheet Documentation  Taken 1/27/2025 1520 by Mae Francisco RN  Safety Promotion/Fall Prevention:   assistive device/personal items within reach   clutter free environment maintained   fall prevention program maintained    lighting adjusted   nonskid shoes/slippers when out of bed   room organization consistent   safety round/check completed  Taken 1/27/2025 1304 by Mae Francisco, RN  Safety Promotion/Fall Prevention:   assistive device/personal items within reach   clutter free environment maintained   fall prevention program maintained   lighting adjusted   nonskid shoes/slippers when out of bed   room organization consistent   safety round/check completed  Taken 1/27/2025 1200 by Mae Francisco, RN  Safety Promotion/Fall Prevention:   assistive device/personal items within reach   clutter free environment maintained   fall prevention program maintained   lighting adjusted   nonskid shoes/slippers when out of bed   room organization consistent   safety round/check completed  Taken 1/27/2025 1005 by Mae Francisco, RN  Safety Promotion/Fall Prevention:   assistive device/personal items within reach   clutter free environment maintained   fall prevention program maintained   lighting adjusted   nonskid shoes/slippers when out of bed   room organization consistent   safety round/check completed  Taken 1/27/2025 0802 by Mae Francisco, RN  Safety Promotion/Fall Prevention:   assistive device/personal items within reach   clutter free environment maintained   fall prevention program maintained   lighting adjusted   nonskid shoes/slippers when out of bed   room organization consistent   safety round/check completed  Taken 1/27/2025 0715 by Mae Francisco, RN  Safety Promotion/Fall Prevention:   assistive device/personal items within reach   clutter free environment maintained   fall prevention program maintained   lighting adjusted   nonskid shoes/slippers when out of bed   room organization consistent   safety round/check completed     Problem: Wound  Goal: Optimal Coping  Outcome: Progressing  Goal: Optimal Functional Ability  Outcome: Progressing  Goal: Absence of Infection Signs and Symptoms  Outcome: Progressing  Goal: Improved  Oral Intake  Outcome: Progressing  Goal: Optimal Pain Control and Function  Outcome: Progressing  Intervention: Prevent or Manage Pain  Recent Flowsheet Documentation  Taken 1/27/2025 1520 by Mae Francisco RN  Pain Management Interventions:   care clustered   pain management plan reviewed with patient/caregiver   quiet environment facilitated   pain medication given  Taken 1/27/2025 0802 by Mae Francisco RN  Pain Management Interventions:   care clustered   pain management plan reviewed with patient/caregiver   pain medication given   quiet environment facilitated  Goal: Skin Health and Integrity  Outcome: Progressing  Goal: Optimal Wound Healing  Outcome: Progressing     Problem: Skin Injury Risk Increased  Goal: Skin Health and Integrity  Outcome: Progressing     Problem: Pain Acute  Goal: Optimal Pain Control and Function  Outcome: Progressing  Intervention: Develop Pain Management Plan  Recent Flowsheet Documentation  Taken 1/27/2025 1520 by Mae Francisco RN  Pain Management Interventions:   care clustered   pain management plan reviewed with patient/caregiver   quiet environment facilitated   pain medication given  Taken 1/27/2025 0802 by Mae Francisco RN  Pain Management Interventions:   care clustered   pain management plan reviewed with patient/caregiver   pain medication given   quiet environment facilitated  Intervention: Prevent or Manage Pain  Recent Flowsheet Documentation  Taken 1/27/2025 0802 by Mae Francisco RN  Medication Review/Management:   medications reviewed   high-risk medications identified   Goal Outcome Evaluation:  Plan of Care Reviewed With: patient        Progress: no change  Outcome Evaluation: Patient is alert and oriented x4. Patient has had complaints of pain, see EMAR. BLE wrapped with kerlix and ACE wrap this shift. Patient has no complaints at this time. Patient refused dressing change on shoulder.

## 2025-01-28 LAB
ANION GAP SERPL CALCULATED.3IONS-SCNC: 9.2 MMOL/L (ref 5–15)
BASOPHILS # BLD AUTO: 0.06 10*3/MM3 (ref 0–0.2)
BASOPHILS NFR BLD AUTO: 1.2 % (ref 0–1.5)
BUN SERPL-MCNC: 10 MG/DL (ref 8–23)
BUN/CREAT SERPL: 25.6 (ref 7–25)
CALCIUM SPEC-SCNC: 8.1 MG/DL (ref 8.6–10.5)
CHLORIDE SERPL-SCNC: 99 MMOL/L (ref 98–107)
CO2 SERPL-SCNC: 23.8 MMOL/L (ref 22–29)
CREAT SERPL-MCNC: 0.39 MG/DL (ref 0.76–1.27)
DEPRECATED RDW RBC AUTO: 61.4 FL (ref 37–54)
EGFRCR SERPLBLD CKD-EPI 2021: 121.3 ML/MIN/1.73
EOSINOPHIL # BLD AUTO: 0.12 10*3/MM3 (ref 0–0.4)
EOSINOPHIL NFR BLD AUTO: 2.5 % (ref 0.3–6.2)
ERYTHROCYTE [DISTWIDTH] IN BLOOD BY AUTOMATED COUNT: 24.5 % (ref 12.3–15.4)
GLUCOSE BLDC GLUCOMTR-MCNC: 147 MG/DL (ref 70–99)
GLUCOSE BLDC GLUCOMTR-MCNC: 148 MG/DL (ref 70–99)
GLUCOSE BLDC GLUCOMTR-MCNC: 171 MG/DL (ref 70–99)
GLUCOSE BLDC GLUCOMTR-MCNC: 174 MG/DL (ref 70–99)
GLUCOSE SERPL-MCNC: 143 MG/DL (ref 65–99)
HCT VFR BLD AUTO: 35.8 % (ref 37.5–51)
HGB BLD-MCNC: 10.5 G/DL (ref 13–17.7)
IMM GRANULOCYTES # BLD AUTO: 0.01 10*3/MM3 (ref 0–0.05)
IMM GRANULOCYTES NFR BLD AUTO: 0.2 % (ref 0–0.5)
LYMPHOCYTES # BLD AUTO: 0.93 10*3/MM3 (ref 0.7–3.1)
LYMPHOCYTES NFR BLD AUTO: 19.3 % (ref 19.6–45.3)
MAGNESIUM SERPL-MCNC: 1.8 MG/DL (ref 1.6–2.4)
MCH RBC QN AUTO: 21 PG (ref 26.6–33)
MCHC RBC AUTO-ENTMCNC: 29.3 G/DL (ref 31.5–35.7)
MCV RBC AUTO: 71.6 FL (ref 79–97)
MONOCYTES # BLD AUTO: 0.54 10*3/MM3 (ref 0.1–0.9)
MONOCYTES NFR BLD AUTO: 11.2 % (ref 5–12)
NEUTROPHILS NFR BLD AUTO: 3.16 10*3/MM3 (ref 1.7–7)
NEUTROPHILS NFR BLD AUTO: 65.6 % (ref 42.7–76)
NRBC BLD AUTO-RTO: 0 /100 WBC (ref 0–0.2)
PHOSPHATE SERPL-MCNC: 2.5 MG/DL (ref 2.5–4.5)
PLATELET # BLD AUTO: 118 10*3/MM3 (ref 140–450)
PMV BLD AUTO: ABNORMAL FL
POTASSIUM SERPL-SCNC: 3.6 MMOL/L (ref 3.5–5.2)
RBC # BLD AUTO: 5 10*6/MM3 (ref 4.14–5.8)
SODIUM SERPL-SCNC: 132 MMOL/L (ref 136–145)
WBC NRBC COR # BLD AUTO: 4.82 10*3/MM3 (ref 3.4–10.8)

## 2025-01-28 PROCEDURE — A9270 NON-COVERED ITEM OR SERVICE: HCPCS | Performed by: STUDENT IN AN ORGANIZED HEALTH CARE EDUCATION/TRAINING PROGRAM

## 2025-01-28 PROCEDURE — 63710000001 SERTRALINE 50 MG TABLET: Performed by: STUDENT IN AN ORGANIZED HEALTH CARE EDUCATION/TRAINING PROGRAM

## 2025-01-28 PROCEDURE — 82948 REAGENT STRIP/BLOOD GLUCOSE: CPT

## 2025-01-28 PROCEDURE — 63710000001 INSULIN GLARGINE PER 5 UNITS: Performed by: STUDENT IN AN ORGANIZED HEALTH CARE EDUCATION/TRAINING PROGRAM

## 2025-01-28 PROCEDURE — 63710000001 FERROUS SULFATE 325 (65 FE) MG TABLET: Performed by: STUDENT IN AN ORGANIZED HEALTH CARE EDUCATION/TRAINING PROGRAM

## 2025-01-28 PROCEDURE — 84100 ASSAY OF PHOSPHORUS: CPT | Performed by: STUDENT IN AN ORGANIZED HEALTH CARE EDUCATION/TRAINING PROGRAM

## 2025-01-28 PROCEDURE — 63710000001 APIXABAN 5 MG TABLET: Performed by: STUDENT IN AN ORGANIZED HEALTH CARE EDUCATION/TRAINING PROGRAM

## 2025-01-28 PROCEDURE — 36415 COLL VENOUS BLD VENIPUNCTURE: CPT | Performed by: STUDENT IN AN ORGANIZED HEALTH CARE EDUCATION/TRAINING PROGRAM

## 2025-01-28 PROCEDURE — 63710000001 DIGOXIN 125 MCG TABLET: Performed by: STUDENT IN AN ORGANIZED HEALTH CARE EDUCATION/TRAINING PROGRAM

## 2025-01-28 PROCEDURE — 63710000001 HYDROCODONE-ACETAMINOPHEN 5-325 MG TABLET: Performed by: STUDENT IN AN ORGANIZED HEALTH CARE EDUCATION/TRAINING PROGRAM

## 2025-01-28 PROCEDURE — 99213 OFFICE O/P EST LOW 20 MIN: CPT | Performed by: INTERNAL MEDICINE

## 2025-01-28 PROCEDURE — 85025 COMPLETE CBC W/AUTO DIFF WBC: CPT | Performed by: STUDENT IN AN ORGANIZED HEALTH CARE EDUCATION/TRAINING PROGRAM

## 2025-01-28 PROCEDURE — 63710000001 INSULIN LISPRO (HUMAN) PER 5 UNITS: Performed by: STUDENT IN AN ORGANIZED HEALTH CARE EDUCATION/TRAINING PROGRAM

## 2025-01-28 PROCEDURE — 63710000001 BUMETANIDE 1 MG TABLET: Performed by: STUDENT IN AN ORGANIZED HEALTH CARE EDUCATION/TRAINING PROGRAM

## 2025-01-28 PROCEDURE — 97602 WOUND(S) CARE NON-SELECTIVE: CPT

## 2025-01-28 PROCEDURE — 80048 BASIC METABOLIC PNL TOTAL CA: CPT | Performed by: STUDENT IN AN ORGANIZED HEALTH CARE EDUCATION/TRAINING PROGRAM

## 2025-01-28 PROCEDURE — 83735 ASSAY OF MAGNESIUM: CPT | Performed by: STUDENT IN AN ORGANIZED HEALTH CARE EDUCATION/TRAINING PROGRAM

## 2025-01-28 PROCEDURE — 82948 REAGENT STRIP/BLOOD GLUCOSE: CPT | Performed by: STUDENT IN AN ORGANIZED HEALTH CARE EDUCATION/TRAINING PROGRAM

## 2025-01-28 PROCEDURE — 63710000001 BUPROPION XL 150 MG TABLET SUSTAINED-RELEASE 24 HOUR: Performed by: STUDENT IN AN ORGANIZED HEALTH CARE EDUCATION/TRAINING PROGRAM

## 2025-01-28 PROCEDURE — 63710000001 ATORVASTATIN 10 MG TABLET: Performed by: STUDENT IN AN ORGANIZED HEALTH CARE EDUCATION/TRAINING PROGRAM

## 2025-01-28 RX ORDER — CHOLESTYRAMINE 4 G/9G
1 POWDER, FOR SUSPENSION ORAL 2 TIMES DAILY
Status: DISCONTINUED | OUTPATIENT
Start: 2025-01-28 | End: 2025-01-30 | Stop reason: HOSPADM

## 2025-01-28 RX ADMIN — INSULIN LISPRO 2 UNITS: 100 INJECTION, SOLUTION INTRAVENOUS; SUBCUTANEOUS at 21:05

## 2025-01-28 RX ADMIN — HYDROCODONE BITARTRATE AND ACETAMINOPHEN 1 TABLET: 5; 325 TABLET ORAL at 03:26

## 2025-01-28 RX ADMIN — BUPROPION HYDROCHLORIDE 150 MG: 150 TABLET, EXTENDED RELEASE ORAL at 09:33

## 2025-01-28 RX ADMIN — INSULIN LISPRO 2 UNITS: 100 INJECTION, SOLUTION INTRAVENOUS; SUBCUTANEOUS at 12:53

## 2025-01-28 RX ADMIN — HYDROCODONE BITARTRATE AND ACETAMINOPHEN 1 TABLET: 5; 325 TABLET ORAL at 09:33

## 2025-01-28 RX ADMIN — INSULIN GLARGINE 10 UNITS: 100 INJECTION, SOLUTION SUBCUTANEOUS at 09:33

## 2025-01-28 RX ADMIN — APIXABAN 5 MG: 5 TABLET, FILM COATED ORAL at 21:05

## 2025-01-28 RX ADMIN — BENZOCAINE 1 APPLICATION: 200 GEL DENTAL; ORAL; PERIODONTAL at 21:52

## 2025-01-28 RX ADMIN — ATORVASTATIN CALCIUM 10 MG: 10 TABLET, FILM COATED ORAL at 21:05

## 2025-01-28 RX ADMIN — HYDROCODONE BITARTRATE AND ACETAMINOPHEN 1 TABLET: 5; 325 TABLET ORAL at 19:54

## 2025-01-28 RX ADMIN — APIXABAN 5 MG: 5 TABLET, FILM COATED ORAL at 09:33

## 2025-01-28 RX ADMIN — BUMETANIDE 1 MG: 1 TABLET ORAL at 09:33

## 2025-01-28 RX ADMIN — SERTRALINE HYDROCHLORIDE 50 MG: 50 TABLET ORAL at 21:05

## 2025-01-28 RX ADMIN — FERROUS SULFATE TAB 325 MG (65 MG ELEMENTAL FE) 325 MG: 325 (65 FE) TAB at 09:33

## 2025-01-28 RX ADMIN — Medication 10 ML: at 09:34

## 2025-01-28 RX ADMIN — Medication 10 ML: at 21:06

## 2025-01-28 RX ADMIN — DIGOXIN 125 MCG: 125 TABLET ORAL at 12:53

## 2025-01-28 NOTE — PLAN OF CARE
Goal Outcome Evaluation:      Patient is AXOX4. Patient has c/o hip pain and is rec hydrocodone and baclofen for pain with reports of pain of 3/10 after administration. Patient is anticipating DC today to either facility in [place] or to home. Patient has had multiple incontinent episodes this shift, stool was formed. Patient refuses turns and refuses some skin care at times due to c/o pain. Educated patient and still refused care at times (turns). No signs of distress noted this shift.

## 2025-01-28 NOTE — PROGRESS NOTES
[facility]   Hospitalist Progress Note  Date: 2025  Patient Name: Jose Shaikh  : 1960  MRN: [id number]  Date of admission: 2025  Room/Bed: 3021/1      Subjective   Subjective     Chief Complaint: unable to take care of self     Summary:Jose Shaikh is a 64 y.o. male  with history of chronic diastolic congestive heart failure, paroxysmal A-fib on Eliquis, type 2 diabetes, morbid obesity, debility with wheelchair dependence, osteoarthritis of hip, chronic venous stasis, deconditioning, hemorrhoids and anasarca who presented to ER after not being able to take care of himself at home.  Patient reported that he had been laying in his own feces/urine for last several days.  He lives alone at home and came to the ER for cleanup.  His potassium was low when he presented to ER which was repleted.  Vital stable.  ER  was involved as patient is not interested in going to nursing home in [place].   contacted [facility] nursing and rehab; patient agreeable to start the referral.  Admitted for pending discharge to nursing home.  No other active issues.  Patient was laying in bed during my evaluation and was getting clean.  No active issues.  Wound care on board. Pending disposition.    Interval Followup: still has some diarrhea, stable.      Objective   Objective     Vitals:   Temp:  [98 °F (36.7 °C)-98.5 °F (36.9 °C)] 98.3 °F (36.8 °C)  Heart Rate:  [88-96] 93  Resp:  [20] 20  BP: (114-143)/(63-95) 143/70    Physical Exam   General: Awake, alert, no distress  Cardiovascular: RRR, no murmurs   Pulmonary: CTA bilaterally; no wheezes; no conversational dyspnea  Gastrointestinal: S/ND/NT, +BS  Neuro: alert, awake, oriented x 3; speech clear; no tremor      Result Review    Result Review:  I have personally reviewed these results:  [x]  Laboratory      Lab 25  0521 25  0508 25  0536   WBC 4.82 4.30 3.93   HEMOGLOBIN 10.5* 10.4* 10.2*   HEMATOCRIT 35.8* 35.1*  34.3*   PLATELETS 118* 116* 118*   NEUTROS ABS 3.16 2.44 2.24   IMMATURE GRANS (ABS) 0.01 0.01 0.01   LYMPHS ABS 0.93 1.15 1.04   MONOS ABS 0.54 0.50 0.49   EOS ABS 0.12 0.14 0.11   MCV 71.6* 71.5* 71.0*         Lab 01/28/25  0521 01/27/25  0508 01/26/25  0536   SODIUM 132* 132* 132*   POTASSIUM 3.6 3.4* 3.5   CHLORIDE 99 99 98   CO2 23.8 25.6 26.5   ANION GAP 9.2 7.4 7.5   BUN 10 10 10   CREATININE 0.39* 0.39* 0.43*   EGFR 121.3 121.3 117.7   GLUCOSE 143* 165* 172*   CALCIUM 8.1* 8.1* 8.0*   MAGNESIUM 1.8 1.8 1.8   PHOSPHORUS 2.5 2.5 2.5         Lab 01/23/25  0605 01/21/25  1707   TOTAL PROTEIN 5.7* 7.0   ALBUMIN 2.6* 3.1*   GLOBULIN  --  3.9   ALT (SGPT) 19 22   AST (SGOT) 43* 42*   BILIRUBIN 1.0 1.4*   INDIRECT BILIRUBIN 0.6  --    BILIRUBIN DIRECT 0.4*  --    ALK PHOS 215* 225*                     Brief Urine Lab Results  (Last result in the past 365 days)        Color   Clarity   Blood   Leuk Est   Nitrite   Protein   CREAT   Urine HCG        01/21/25 1703 Dark Yellow   Clear   Negative   Small (1+)   Negative   Negative                 [x]  Microbiology   Microbiology Results (last 10 days)       Procedure Component Value - Date/Time    Clostridioides difficile Toxin - Stool, Per Rectum [[id number]]  (Abnormal) Collected: 01/22/25 1517    Lab Status: Final result Specimen: Stool from Per Rectum Updated: 01/22/25 1621    Narrative:      The following orders were created for panel order Clostridioides difficile Toxin - Stool, Per Rectum.  Procedure                               Abnormality         Status                     ---------                               -----------         ------                     Clostridioides difficile...[[id number]]  Abnormal            Final result                 Please view results for these tests on the individual orders.    Clostridioides difficile Toxin, PCR - Stool, Per Rectum [[id number]]  (Abnormal) Collected: 01/22/25 1517    Lab Status: Final result Specimen: Stool from  Per Rectum Updated: 01/22/25 1621     Toxigenic C. difficile by PCR Positive     027 Toxin Presumptive Negative    Narrative:      DNA from a toxigenic strain of C.difficile has been detected. Antigen testing for the presence of free C.difficile toxin is currently in progress, to help determine the clinical significance of this PCR result.     Clostridioides difficile toxin Ag, Reflex - Stool, Per Rectum [[id number]]  (Normal) Collected: 01/22/25 1517    Lab Status: Final result Specimen: Stool from Per Rectum Updated: 01/22/25 1645     C.diff Toxin Ag Negative    Narrative:      DNA from a toxigenic strain of C.difficile was detected, although the free toxin itself was not detected. These findings are consistent with C.difficile colonization and may not reflect actual C.difficile infection. Clinical correlation needed.    Urine Culture - Urine, Urine, Clean Catch [[id number]] Collected: 01/21/25 1703    Lab Status: Final result Specimen: Urine, Clean Catch Updated: 01/22/25 1141     Urine Culture 25,000 CFU/mL Normal Urogenital Berenice    Narrative:      Colonization of the urinary tract without infection is common. Treatment is discouraged unless the patient is symptomatic, pregnant, or undergoing an invasive urologic procedure.          [x]  Radiology  XR Chest 1 View    Result Date: 1/21/2025  Impression: An acute pulmonary process is not apparent. Electronically Signed: Jorge Urban MD  1/21/2025 4:22 PM EST  Workstation ID: [id number]   []  EKG/Telemetry   []  Cardiology/Vascular   []  Pathology  []  Old records  []  Other:    Assessment & Plan   Assessment / Plan     Assessment:  Unable to take care of himself at home  History of chronic diastolic congestive heart failure  paroxysmal A-fib on Eliquis  Type 2 diabetes  Morbid obesity  Debility with wheelchair dependence  Osteoarthritis of hip  Chronic venous stasis  Deconditioning  Hemorrhoids   Anasarca    Plan:  Patient is being managed in outpatient  bed.  Presented after not being able to take care of himself and had been laying in feces/urine for past 3 days.  Patient was cleaned and  working on his disposition.  Referral sent to [facility] and rehab at [place].  Pending disposition.  Home medication reconciled and restarted.    Found to have C diff colonization; negative for toxin. Has history of C. difficile colonization.  No leukocytosis and renal function stable.   Started on as needed Imodium for diarrhea.  Seems chronic.  On as needed baclofen for muscle spasm.  On PRN Norco for pain every 6 hours.  On Orajel as needed for mouth pain.  No signs of infection to my evaluation.  Continue rest of current management.  Add Questran  Awaiting precert for rehab.       Discussed with CM    VTE Prophylaxis:  Pharmacologic VTE prophylaxis orders are present.        CODE STATUS:   Code Status (Patient has no pulse and is not breathing): CPR (Attempt to Resuscitate)  Medical Interventions (Patient has pulse or is breathing): Full Support      Electronically signed by Jose Maya MD, 01/28/25, 12:57 PM EST.

## 2025-01-28 NOTE — PLAN OF CARE
Problem: Adult Inpatient Plan of Care  Goal: Plan of Care Review  Outcome: Progressing  Flowsheets (Taken 1/28/2025 1837)  Progress: no change  Outcome Evaluation: VSS. BLOOD SUGARS HAVE BEEN STABLE. GAVE PAIN MEDICATION AS ORDERED. CHANGED WOUND CARE AS ORDERED. NO NEW CHANGES AT THIS TIME.  Plan of Care Reviewed With: patient  Goal: Patient-Specific Goal (Individualized)  Outcome: Progressing  Goal: Absence of Hospital-Acquired Illness or Injury  Outcome: Progressing  Intervention: Identify and Manage Fall Risk  Recent Flowsheet Documentation  Taken 1/28/2025 1720 by Francisco Javier Hall RN  Safety Promotion/Fall Prevention:   fall prevention program maintained   nonskid shoes/slippers when out of bed   room organization consistent   safety round/check completed   assistive device/personal items within reach   lighting adjusted  Taken 1/28/2025 1545 by Francisco Javier Hall RN  Safety Promotion/Fall Prevention:   fall prevention program maintained   nonskid shoes/slippers when out of bed   room organization consistent   safety round/check completed   assistive device/personal items within reach   lighting adjusted  Taken 1/28/2025 1326 by Francisco Javier Hall RN  Safety Promotion/Fall Prevention:   fall prevention program maintained   nonskid shoes/slippers when out of bed   room organization consistent   safety round/check completed   assistive device/personal items within reach   lighting adjusted  Taken 1/28/2025 1130 by Francisco Javier Hall RN  Safety Promotion/Fall Prevention:   fall prevention program maintained   nonskid shoes/slippers when out of bed   room organization consistent   safety round/check completed   assistive device/personal items within reach   lighting adjusted  Taken 1/28/2025 0930 by Francisco Javier Hall RN  Safety Promotion/Fall Prevention: safety round/check completed  Taken 1/28/2025 0715 by Francisco Javier Hall RN  Safety Promotion/Fall Prevention:   fall prevention program maintained   nonskid shoes/slippers when out  of bed   room organization consistent   safety round/check completed   assistive device/personal items within reach   lighting adjusted  Goal: Optimal Comfort and Wellbeing  Outcome: Progressing  Intervention: Monitor Pain and Promote Comfort  Recent Flowsheet Documentation  Taken 1/28/2025 0930 by Francisco Javier Hall RN  Pain Management Interventions:   pillow support provided   pain medication given   pain management plan reviewed with patient/caregiver  Goal: Readiness for Transition of Care  Outcome: Progressing     Problem: Fall Injury Risk  Goal: Absence of Fall and Fall-Related Injury  Outcome: Progressing  Intervention: Promote Injury-Free Environment  Recent Flowsheet Documentation  Taken 1/28/2025 1720 by Francisco Javier Hall RN  Safety Promotion/Fall Prevention:   fall prevention program maintained   nonskid shoes/slippers when out of bed   room organization consistent   safety round/check completed   assistive device/personal items within reach   lighting adjusted  Taken 1/28/2025 1545 by Francisco Javier Hall RN  Safety Promotion/Fall Prevention:   fall prevention program maintained   nonskid shoes/slippers when out of bed   room organization consistent   safety round/check completed   assistive device/personal items within reach   lighting adjusted  Taken 1/28/2025 1326 by Francisco Javier Hall RN  Safety Promotion/Fall Prevention:   fall prevention program maintained   nonskid shoes/slippers when out of bed   room organization consistent   safety round/check completed   assistive device/personal items within reach   lighting adjusted  Taken 1/28/2025 1130 by Francisco Javier Hall RN  Safety Promotion/Fall Prevention:   fall prevention program maintained   nonskid shoes/slippers when out of bed   room organization consistent   safety round/check completed   assistive device/personal items within reach   lighting adjusted  Taken 1/28/2025 0930 by Francisco Javier Hall RN  Safety Promotion/Fall Prevention: safety round/check completed  Taken  1/28/2025 0715 by Francisco Javier Hall RN  Safety Promotion/Fall Prevention:   fall prevention program maintained   nonskid shoes/slippers when out of bed   room organization consistent   safety round/check completed   assistive device/personal items within reach   lighting adjusted     Problem: Wound  Goal: Optimal Coping  Outcome: Progressing  Goal: Optimal Functional Ability  Outcome: Progressing  Goal: Absence of Infection Signs and Symptoms  Outcome: Progressing  Goal: Improved Oral Intake  Outcome: Progressing  Goal: Optimal Pain Control and Function  Outcome: Progressing  Intervention: Prevent or Manage Pain  Recent Flowsheet Documentation  Taken 1/28/2025 0930 by Francisco Javier Hall RN  Pain Management Interventions:   pillow support provided   pain medication given   pain management plan reviewed with patient/caregiver  Goal: Skin Health and Integrity  Outcome: Progressing  Goal: Optimal Wound Healing  Outcome: Progressing     Problem: Skin Injury Risk Increased  Goal: Skin Health and Integrity  Outcome: Progressing     Problem: Pain Acute  Goal: Optimal Pain Control and Function  Outcome: Progressing  Intervention: Develop Pain Management Plan  Recent Flowsheet Documentation  Taken 1/28/2025 0930 by Francisco Javier Hall RN  Pain Management Interventions:   pillow support provided   pain medication given   pain management plan reviewed with patient/caregiver     Problem: Hospitalized Older Adult  Goal: Optimal Coping  Outcome: Progressing  Goal: Optimal Functional Ability  Outcome: Progressing  Goal: Adequate Sleep/Rest  Outcome: Progressing  Goal: Effective Urinary Elimination  Outcome: Progressing   Goal Outcome Evaluation:  Plan of Care Reviewed With: patient        Progress: no change  Outcome Evaluation: VSS. BLOOD SUGARS HAVE BEEN STABLE. GAVE PAIN MEDICATION AS ORDERED. CHANGED WOUND CARE AS ORDERED. NO NEW CHANGES AT THIS TIME.

## 2025-01-29 LAB
ANION GAP SERPL CALCULATED.3IONS-SCNC: 9.6 MMOL/L (ref 5–15)
BASOPHILS # BLD AUTO: 0.07 10*3/MM3 (ref 0–0.2)
BASOPHILS NFR BLD AUTO: 1.6 % (ref 0–1.5)
BUN SERPL-MCNC: 7 MG/DL (ref 8–23)
BUN/CREAT SERPL: 19.4 (ref 7–25)
CALCIUM SPEC-SCNC: 8.2 MG/DL (ref 8.6–10.5)
CHLORIDE SERPL-SCNC: 98 MMOL/L (ref 98–107)
CO2 SERPL-SCNC: 25.4 MMOL/L (ref 22–29)
CREAT SERPL-MCNC: 0.36 MG/DL (ref 0.76–1.27)
DEPRECATED RDW RBC AUTO: 61.5 FL (ref 37–54)
EGFRCR SERPLBLD CKD-EPI 2021: 124.2 ML/MIN/1.73
EOSINOPHIL # BLD AUTO: 0.11 10*3/MM3 (ref 0–0.4)
EOSINOPHIL NFR BLD AUTO: 2.4 % (ref 0.3–6.2)
ERYTHROCYTE [DISTWIDTH] IN BLOOD BY AUTOMATED COUNT: 24.4 % (ref 12.3–15.4)
GLUCOSE BLDC GLUCOMTR-MCNC: 148 MG/DL (ref 70–99)
GLUCOSE BLDC GLUCOMTR-MCNC: 209 MG/DL (ref 70–99)
GLUCOSE BLDC GLUCOMTR-MCNC: 209 MG/DL (ref 70–99)
GLUCOSE BLDC GLUCOMTR-MCNC: 246 MG/DL (ref 70–99)
GLUCOSE SERPL-MCNC: 129 MG/DL (ref 65–99)
HCT VFR BLD AUTO: 33.6 % (ref 37.5–51)
HGB BLD-MCNC: 10.1 G/DL (ref 13–17.7)
IMM GRANULOCYTES # BLD AUTO: 0.01 10*3/MM3 (ref 0–0.05)
IMM GRANULOCYTES NFR BLD AUTO: 0.2 % (ref 0–0.5)
LYMPHOCYTES # BLD AUTO: 1.08 10*3/MM3 (ref 0.7–3.1)
LYMPHOCYTES NFR BLD AUTO: 24.1 % (ref 19.6–45.3)
MAGNESIUM SERPL-MCNC: 1.9 MG/DL (ref 1.6–2.4)
MCH RBC QN AUTO: 21.4 PG (ref 26.6–33)
MCHC RBC AUTO-ENTMCNC: 30.1 G/DL (ref 31.5–35.7)
MCV RBC AUTO: 71.3 FL (ref 79–97)
MONOCYTES # BLD AUTO: 0.54 10*3/MM3 (ref 0.1–0.9)
MONOCYTES NFR BLD AUTO: 12 % (ref 5–12)
NEUTROPHILS NFR BLD AUTO: 2.68 10*3/MM3 (ref 1.7–7)
NEUTROPHILS NFR BLD AUTO: 59.7 % (ref 42.7–76)
NRBC BLD AUTO-RTO: 0 /100 WBC (ref 0–0.2)
PHOSPHATE SERPL-MCNC: 3 MG/DL (ref 2.5–4.5)
PLATELET # BLD AUTO: 115 10*3/MM3 (ref 140–450)
PMV BLD AUTO: ABNORMAL FL
POTASSIUM SERPL-SCNC: 3.4 MMOL/L (ref 3.5–5.2)
RBC # BLD AUTO: 4.71 10*6/MM3 (ref 4.14–5.8)
SODIUM SERPL-SCNC: 133 MMOL/L (ref 136–145)
WBC NRBC COR # BLD AUTO: 4.49 10*3/MM3 (ref 3.4–10.8)

## 2025-01-29 PROCEDURE — 80048 BASIC METABOLIC PNL TOTAL CA: CPT | Performed by: STUDENT IN AN ORGANIZED HEALTH CARE EDUCATION/TRAINING PROGRAM

## 2025-01-29 PROCEDURE — 63710000001 FERROUS SULFATE 325 (65 FE) MG TABLET: Performed by: STUDENT IN AN ORGANIZED HEALTH CARE EDUCATION/TRAINING PROGRAM

## 2025-01-29 PROCEDURE — 63710000001 INSULIN LISPRO (HUMAN) PER 5 UNITS: Performed by: STUDENT IN AN ORGANIZED HEALTH CARE EDUCATION/TRAINING PROGRAM

## 2025-01-29 PROCEDURE — 97602 WOUND(S) CARE NON-SELECTIVE: CPT

## 2025-01-29 PROCEDURE — A9270 NON-COVERED ITEM OR SERVICE: HCPCS | Performed by: STUDENT IN AN ORGANIZED HEALTH CARE EDUCATION/TRAINING PROGRAM

## 2025-01-29 PROCEDURE — A9270 NON-COVERED ITEM OR SERVICE: HCPCS | Performed by: PHYSICIAN ASSISTANT

## 2025-01-29 PROCEDURE — 63710000001 ATORVASTATIN 10 MG TABLET: Performed by: STUDENT IN AN ORGANIZED HEALTH CARE EDUCATION/TRAINING PROGRAM

## 2025-01-29 PROCEDURE — 82948 REAGENT STRIP/BLOOD GLUCOSE: CPT

## 2025-01-29 PROCEDURE — 99213 OFFICE O/P EST LOW 20 MIN: CPT | Performed by: INTERNAL MEDICINE

## 2025-01-29 PROCEDURE — 63710000001 SERTRALINE 50 MG TABLET: Performed by: STUDENT IN AN ORGANIZED HEALTH CARE EDUCATION/TRAINING PROGRAM

## 2025-01-29 PROCEDURE — 63710000001 INSULIN GLARGINE PER 5 UNITS: Performed by: STUDENT IN AN ORGANIZED HEALTH CARE EDUCATION/TRAINING PROGRAM

## 2025-01-29 PROCEDURE — 63710000001 APIXABAN 5 MG TABLET: Performed by: STUDENT IN AN ORGANIZED HEALTH CARE EDUCATION/TRAINING PROGRAM

## 2025-01-29 PROCEDURE — 85025 COMPLETE CBC W/AUTO DIFF WBC: CPT | Performed by: STUDENT IN AN ORGANIZED HEALTH CARE EDUCATION/TRAINING PROGRAM

## 2025-01-29 PROCEDURE — 83735 ASSAY OF MAGNESIUM: CPT | Performed by: STUDENT IN AN ORGANIZED HEALTH CARE EDUCATION/TRAINING PROGRAM

## 2025-01-29 PROCEDURE — 36415 COLL VENOUS BLD VENIPUNCTURE: CPT | Performed by: STUDENT IN AN ORGANIZED HEALTH CARE EDUCATION/TRAINING PROGRAM

## 2025-01-29 PROCEDURE — 82948 REAGENT STRIP/BLOOD GLUCOSE: CPT | Performed by: STUDENT IN AN ORGANIZED HEALTH CARE EDUCATION/TRAINING PROGRAM

## 2025-01-29 PROCEDURE — A9270 NON-COVERED ITEM OR SERVICE: HCPCS | Performed by: INTERNAL MEDICINE

## 2025-01-29 PROCEDURE — 63710000001 BACLOFEN 10 MG TABLET: Performed by: STUDENT IN AN ORGANIZED HEALTH CARE EDUCATION/TRAINING PROGRAM

## 2025-01-29 PROCEDURE — 63710000001 DIGOXIN 125 MCG TABLET: Performed by: STUDENT IN AN ORGANIZED HEALTH CARE EDUCATION/TRAINING PROGRAM

## 2025-01-29 PROCEDURE — 63710000001 CHOLESTYRAMINE 4 G PACK: Performed by: INTERNAL MEDICINE

## 2025-01-29 PROCEDURE — 63710000001 ONDANSETRON ODT 4 MG TABLET DISPERSIBLE: Performed by: PHYSICIAN ASSISTANT

## 2025-01-29 PROCEDURE — 63710000001 BUPROPION XL 150 MG TABLET SUSTAINED-RELEASE 24 HOUR: Performed by: STUDENT IN AN ORGANIZED HEALTH CARE EDUCATION/TRAINING PROGRAM

## 2025-01-29 PROCEDURE — 63710000001 LOPERAMIDE 2 MG CAPSULE: Performed by: STUDENT IN AN ORGANIZED HEALTH CARE EDUCATION/TRAINING PROGRAM

## 2025-01-29 PROCEDURE — 63710000001 POTASSIUM CHLORIDE 10 MEQ CAPSULE CONTROLLED-RELEASE: Performed by: INTERNAL MEDICINE

## 2025-01-29 PROCEDURE — 63710000001 NYSTATIN 100000 UNIT/GM POWDER 15 G BOTTLE: Performed by: PHYSICIAN ASSISTANT

## 2025-01-29 PROCEDURE — 63710000001 HYDROCODONE-ACETAMINOPHEN 5-325 MG TABLET: Performed by: STUDENT IN AN ORGANIZED HEALTH CARE EDUCATION/TRAINING PROGRAM

## 2025-01-29 PROCEDURE — 63710000001 BUMETANIDE 1 MG TABLET: Performed by: STUDENT IN AN ORGANIZED HEALTH CARE EDUCATION/TRAINING PROGRAM

## 2025-01-29 PROCEDURE — 84100 ASSAY OF PHOSPHORUS: CPT | Performed by: STUDENT IN AN ORGANIZED HEALTH CARE EDUCATION/TRAINING PROGRAM

## 2025-01-29 RX ORDER — BUMETANIDE 0.25 MG/ML
1 INJECTION, SOLUTION INTRAMUSCULAR; INTRAVENOUS DAILY
Status: DISCONTINUED | OUTPATIENT
Start: 2025-01-30 | End: 2025-01-30 | Stop reason: HOSPADM

## 2025-01-29 RX ORDER — NYSTATIN 100000 [USP'U]/G
POWDER TOPICAL EVERY 12 HOURS SCHEDULED
Status: DISCONTINUED | OUTPATIENT
Start: 2025-01-29 | End: 2025-01-30 | Stop reason: HOSPADM

## 2025-01-29 RX ORDER — POTASSIUM CHLORIDE 750 MG/1
30 CAPSULE, EXTENDED RELEASE ORAL ONCE
Status: COMPLETED | OUTPATIENT
Start: 2025-01-29 | End: 2025-01-29

## 2025-01-29 RX ADMIN — INSULIN LISPRO 4 UNITS: 100 INJECTION, SOLUTION INTRAVENOUS; SUBCUTANEOUS at 20:31

## 2025-01-29 RX ADMIN — BACLOFEN 10 MG: 10 TABLET ORAL at 04:31

## 2025-01-29 RX ADMIN — POTASSIUM CHLORIDE 30 MEQ: 750 CAPSULE, EXTENDED RELEASE ORAL at 12:31

## 2025-01-29 RX ADMIN — INSULIN GLARGINE 10 UNITS: 100 INJECTION, SOLUTION SUBCUTANEOUS at 10:26

## 2025-01-29 RX ADMIN — BUPROPION HYDROCHLORIDE 150 MG: 150 TABLET, EXTENDED RELEASE ORAL at 10:27

## 2025-01-29 RX ADMIN — INSULIN LISPRO 4 UNITS: 100 INJECTION, SOLUTION INTRAVENOUS; SUBCUTANEOUS at 12:33

## 2025-01-29 RX ADMIN — DIGOXIN 125 MCG: 125 TABLET ORAL at 12:31

## 2025-01-29 RX ADMIN — APIXABAN 5 MG: 5 TABLET, FILM COATED ORAL at 20:31

## 2025-01-29 RX ADMIN — FERROUS SULFATE TAB 325 MG (65 MG ELEMENTAL FE) 325 MG: 325 (65 FE) TAB at 10:27

## 2025-01-29 RX ADMIN — BUMETANIDE 1 MG: 1 TABLET ORAL at 10:27

## 2025-01-29 RX ADMIN — ATORVASTATIN CALCIUM 10 MG: 10 TABLET, FILM COATED ORAL at 20:32

## 2025-01-29 RX ADMIN — LOPERAMIDE HYDROCHLORIDE 4 MG: 2 CAPSULE ORAL at 00:30

## 2025-01-29 RX ADMIN — INSULIN LISPRO 4 UNITS: 100 INJECTION, SOLUTION INTRAVENOUS; SUBCUTANEOUS at 10:26

## 2025-01-29 RX ADMIN — SERTRALINE HYDROCHLORIDE 50 MG: 50 TABLET ORAL at 20:31

## 2025-01-29 RX ADMIN — Medication 10 ML: at 10:37

## 2025-01-29 RX ADMIN — Medication 10 ML: at 20:32

## 2025-01-29 RX ADMIN — BENZOCAINE: 200 GEL DENTAL; ORAL; PERIODONTAL at 15:06

## 2025-01-29 RX ADMIN — LOPERAMIDE HYDROCHLORIDE 4 MG: 2 CAPSULE ORAL at 06:47

## 2025-01-29 RX ADMIN — NYSTATIN: 100000 POWDER TOPICAL at 22:40

## 2025-01-29 RX ADMIN — BENZOCAINE 1 APPLICATION: 200 GEL DENTAL; ORAL; PERIODONTAL at 21:55

## 2025-01-29 RX ADMIN — HYDROCODONE BITARTRATE AND ACETAMINOPHEN 1 TABLET: 5; 325 TABLET ORAL at 10:27

## 2025-01-29 RX ADMIN — BACLOFEN 10 MG: 10 TABLET ORAL at 15:06

## 2025-01-29 RX ADMIN — HYDROCODONE BITARTRATE AND ACETAMINOPHEN 1 TABLET: 5; 325 TABLET ORAL at 21:54

## 2025-01-29 RX ADMIN — CHOLESTYRAMINE 1 PACKET: 4 POWDER, FOR SUSPENSION ORAL at 20:32

## 2025-01-29 RX ADMIN — ONDANSETRON 4 MG: 4 TABLET, ORALLY DISINTEGRATING ORAL at 00:30

## 2025-01-29 RX ADMIN — APIXABAN 5 MG: 5 TABLET, FILM COATED ORAL at 10:26

## 2025-01-29 NOTE — PLAN OF CARE
Problem: Adult Inpatient Plan of Care  Goal: Plan of Care Review  Outcome: Progressing  Flowsheets (Taken 1/29/2025 0508)  Plan of Care Reviewed With: patient  Goal: Patient-Specific Goal (Individualized)  Outcome: Progressing  Goal: Absence of Hospital-Acquired Illness or Injury  Outcome: Progressing  Goal: Optimal Comfort and Wellbeing  Outcome: Progressing  Intervention: Provide Person-Centered Care  Recent Flowsheet Documentation  Taken 1/28/2025 2004 by Tonya Joyce RN  Trust Relationship/Rapport:   care explained   choices provided   emotional support provided   empathic listening provided   questions encouraged   reassurance provided   questions answered   thoughts/feelings acknowledged  Goal: Readiness for Transition of Care  Outcome: Progressing     Problem: Fall Injury Risk  Goal: Absence of Fall and Fall-Related Injury  Outcome: Progressing     Problem: Wound  Goal: Optimal Coping  Outcome: Progressing  Intervention: Support Patient and Family Response  Recent Flowsheet Documentation  Taken 1/28/2025 2004 by Tonya Joyce RN  Family/Support System Care:   support provided   self-care encouraged  Goal: Optimal Functional Ability  Outcome: Progressing  Intervention: Optimize Functional Ability  Recent Flowsheet Documentation  Taken 1/28/2025 2004 by Tonya Joyce RN  Activity Management: bedrest  Activity Assistance Provided: assistance, 2 people  Goal: Absence of Infection Signs and Symptoms  Outcome: Progressing  Goal: Improved Oral Intake  Outcome: Progressing  Goal: Optimal Pain Control and Function  Outcome: Progressing  Goal: Skin Health and Integrity  Outcome: Progressing  Intervention: Optimize Skin Protection  Recent Flowsheet Documentation  Taken 1/28/2025 2004 by Tonya Joyce RN  Activity Management: bedrest  Goal: Optimal Wound Healing  Outcome: Progressing     Problem: Skin Injury Risk Increased  Goal: Skin Health and Integrity  Outcome:  Progressing  Intervention: Optimize Skin Protection  Recent Flowsheet Documentation  Taken 1/28/2025 2004 by Tonya Joyce RN  Activity Management: bedrest     Problem: Pain Acute  Goal: Optimal Pain Control and Function  Outcome: Progressing  Intervention: Optimize Psychosocial Wellbeing  Recent Flowsheet Documentation  Taken 1/28/2025 2004 by Tonya Joyce RN  Diversional Activities: television     Problem: Hospitalized Older Adult  Goal: Optimal Coping  Outcome: Progressing  Intervention: Promote Psychosocial Wellbeing  Recent Flowsheet Documentation  Taken 1/28/2025 2004 by Tonya Joyce RN  Family/Support System Care:   support provided   self-care encouraged  Goal: Optimal Functional Ability  Outcome: Progressing  Intervention: Promote Functional Ability  Recent Flowsheet Documentation  Taken 1/28/2025 2004 by Tonya Joyce RN  Activity Management: bedrest  Activity Assistance Provided: assistance, 2 people  Goal: Adequate Sleep/Rest  Outcome: Progressing  Goal: Effective Urinary Elimination  Outcome: Progressing   Goal Outcome Evaluation:  Plan of Care Reviewed With: patient              Pt diarrhea continues. Imodium given per order .VSS, Blood sugar monitored. Prn  pain medication given for c/o 8/10  back pain. Continue plan of care

## 2025-01-29 NOTE — PROGRESS NOTES
[facility]   Hospitalist Progress Note  Date: 2025  Patient Name: Jose Shaikh  : 1960  MRN: [id number]  Date of admission: 2025  Room/Bed: 3021/1      Subjective   Subjective     Chief Complaint: unable to take care of self     Summary:Jose Shaikh is a 64 y.o. male  with history of chronic diastolic congestive heart failure, paroxysmal A-fib on Eliquis, type 2 diabetes, morbid obesity, debility with wheelchair dependence, osteoarthritis of hip, chronic venous stasis, deconditioning, hemorrhoids and anasarca who presented to ER after not being able to take care of himself at home.  Patient reported that he had been laying in his own feces/urine for last several days.  He lives alone at home and came to the ER for cleanup.  His potassium was low when he presented to ER which was repleted.  Vital stable.  ER  was involved as patient is not interested in going to nursing home in [place].   contacted [facility] nursing and rehab; patient agreeable to start the referral.  Admitted for pending discharge to nursing home.  No other active issues.  Patient was laying in bed during my evaluation and was getting clean.  No active issues.  Wound care on board. Pending disposition.    Interval Followup: still w/ diarrhea      Objective   Objective     Vitals:   Temp:  [97.9 °F (36.6 °C)-98.7 °F (37.1 °C)] 98.2 °F (36.8 °C)  Heart Rate:  [81-98] 98  Resp:  [18-20] 20  BP: (127-143)/(64-72) 137/71    Physical Exam   General: Awake, alert, eating  Cardiovascular: RRR, no murmurs   Pulmonary: CTA bilaterally; no wheezes; no conversational dyspnea  Gastrointestinal: S/ND/NT, +BS  Neuro: alert, awake, oriented x 3; speech clear; no tremor      Result Review    Result Review:  I have personally reviewed these results:  [x]  Laboratory      Lab 25  0522 25  0521 25  0508   WBC 4.49 4.82 4.30   HEMOGLOBIN 10.1* 10.5* 10.4*   HEMATOCRIT 33.6* 35.8* 35.1*   PLATELETS  115* 118* 116*   NEUTROS ABS 2.68 3.16 2.44   IMMATURE GRANS (ABS) 0.01 0.01 0.01   LYMPHS ABS 1.08 0.93 1.15   MONOS ABS 0.54 0.54 0.50   EOS ABS 0.11 0.12 0.14   MCV 71.3* 71.6* 71.5*         Lab 01/29/25  0522 01/28/25  0521 01/27/25  0508   SODIUM 133* 132* 132*   POTASSIUM 3.4* 3.6 3.4*   CHLORIDE 98 99 99   CO2 25.4 23.8 25.6   ANION GAP 9.6 9.2 7.4   BUN 7* 10 10   CREATININE 0.36* 0.39* 0.39*   EGFR 124.2 121.3 121.3   GLUCOSE 129* 143* 165*   CALCIUM 8.2* 8.1* 8.1*   MAGNESIUM 1.9 1.8 1.8   PHOSPHORUS 3.0 2.5 2.5         Lab 01/23/25  0605   TOTAL PROTEIN 5.7*   ALBUMIN 2.6*   ALT (SGPT) 19   AST (SGOT) 43*   BILIRUBIN 1.0   INDIRECT BILIRUBIN 0.6   BILIRUBIN DIRECT 0.4*   ALK PHOS 215*                     Brief Urine Lab Results  (Last result in the past 365 days)        Color   Clarity   Blood   Leuk Est   Nitrite   Protein   CREAT   Urine HCG        01/21/25 1703 Dark Yellow   Clear   Negative   Small (1+)   Negative   Negative                 [x]  Microbiology   Microbiology Results (last 10 days)       Procedure Component Value - Date/Time    Clostridioides difficile Toxin - Stool, Per Rectum [[id number]]  (Abnormal) Collected: 01/22/25 1517    Lab Status: Final result Specimen: Stool from Per Rectum Updated: 01/22/25 1621    Narrative:      The following orders were created for panel order Clostridioides difficile Toxin - Stool, Per Rectum.  Procedure                               Abnormality         Status                     ---------                               -----------         ------                     Clostridioides difficile...[[id number]]  Abnormal            Final result                 Please view results for these tests on the individual orders.    Clostridioides difficile Toxin, PCR - Stool, Per Rectum [[id number]]  (Abnormal) Collected: 01/22/25 1517    Lab Status: Final result Specimen: Stool from Per Rectum Updated: 01/22/25 1621     Toxigenic C. difficile by PCR Positive     027  Toxin Presumptive Negative    Narrative:      DNA from a toxigenic strain of C.difficile has been detected. Antigen testing for the presence of free C.difficile toxin is currently in progress, to help determine the clinical significance of this PCR result.     Clostridioides difficile toxin Ag, Reflex - Stool, Per Rectum [[id number]]  (Normal) Collected: 01/22/25 1517    Lab Status: Final result Specimen: Stool from Per Rectum Updated: 01/22/25 1645     C.diff Toxin Ag Negative    Narrative:      DNA from a toxigenic strain of C.difficile was detected, although the free toxin itself was not detected. These findings are consistent with C.difficile colonization and may not reflect actual C.difficile infection. Clinical correlation needed.    Urine Culture - Urine, Urine, Clean Catch [[id number]] Collected: 01/21/25 1703    Lab Status: Final result Specimen: Urine, Clean Catch Updated: 01/22/25 1141     Urine Culture 25,000 CFU/mL Normal Urogenital Berenice    Narrative:      Colonization of the urinary tract without infection is common. Treatment is discouraged unless the patient is symptomatic, pregnant, or undergoing an invasive urologic procedure.          [x]  Radiology  XR Chest 1 View    Result Date: 1/21/2025  Impression: An acute pulmonary process is not apparent. Electronically Signed: Joreg Urban MD  1/21/2025 4:22 PM EST  Workstation ID: [id number]   []  EKG/Telemetry   []  Cardiology/Vascular   []  Pathology  []  Old records  []  Other:    Assessment & Plan   Assessment / Plan     Assessment:  Unable to take care of himself at home  History of chronic diastolic congestive heart failure  paroxysmal A-fib on Eliquis  Type 2 diabetes  Morbid obesity  Debility with wheelchair dependence  Osteoarthritis of hip  Chronic venous stasis  Deconditioning  Hemorrhoids   Anasarca    Plan:  Patient is being managed in outpatient bed.  Presented after not being able to take care of himself and had been laying in feces/urine  for past 3 days.  Patient was cleaned and  working on his disposition.  Referral sent to [facility] and rehab at [place].  Pending disposition.  Home medication reconciled and restarted.    Found to have C diff colonization; negative for toxin. Has history of C. difficile colonization.  No leukocytosis and renal function stable.   Started on as needed Imodium for diarrhea.  Seems chronic.  On as needed baclofen for muscle spasm.  On PRN Norco for pain every 6 hours.  On Orajel as needed for mouth pain.  No signs of infection to my evaluation.  Continue rest of current management.  Added Questran  Awaiting precert for rehab.       Discussed with CM    VTE Prophylaxis:  Pharmacologic VTE prophylaxis orders are present.        CODE STATUS:   Code Status (Patient has no pulse and is not breathing): CPR (Attempt to Resuscitate)  Medical Interventions (Patient has pulse or is breathing): Full Support      Electronically signed by Jose Maya MD, 01/29/25 10:02 EST

## 2025-01-29 NOTE — PLAN OF CARE
Goal Outcome Evaluation:  Plan of Care Reviewed With: patient        Progress: no change  Vitals WNL. BS have been stable. Pt has been resting well. Has continued to have liquid bowel movements throughout the shift. Wound care done. Did refuse for me to rewrap his legs. Pt did complain of pain this shift. NORCO given. No other complaints this shift. Call light within reach. Will continue plan of care.

## 2025-01-30 VITALS
TEMPERATURE: 97.9 F | RESPIRATION RATE: 16 BRPM | BODY MASS INDEX: 37.63 KG/M2 | HEART RATE: 91 BPM | DIASTOLIC BLOOD PRESSURE: 70 MMHG | SYSTOLIC BLOOD PRESSURE: 124 MMHG | WEIGHT: 293.21 LBS | HEIGHT: 74 IN | OXYGEN SATURATION: 91 %

## 2025-01-30 LAB
CALCIUM SPEC-SCNC: 7.8 MG/DL (ref 8.6–10.5)
GLUCOSE BLDC GLUCOMTR-MCNC: 167 MG/DL (ref 70–99)
GLUCOSE BLDC GLUCOMTR-MCNC: 198 MG/DL (ref 70–99)
WHOLE BLOOD HOLD SPECIMEN: NORMAL

## 2025-01-30 PROCEDURE — 63710000001 INSULIN GLARGINE PER 5 UNITS: Performed by: STUDENT IN AN ORGANIZED HEALTH CARE EDUCATION/TRAINING PROGRAM

## 2025-01-30 PROCEDURE — A9270 NON-COVERED ITEM OR SERVICE: HCPCS | Performed by: STUDENT IN AN ORGANIZED HEALTH CARE EDUCATION/TRAINING PROGRAM

## 2025-01-30 PROCEDURE — 63710000001 OXYCODONE 5 MG TABLET: Performed by: INTERNAL MEDICINE

## 2025-01-30 PROCEDURE — 63710000001 APIXABAN 5 MG TABLET: Performed by: STUDENT IN AN ORGANIZED HEALTH CARE EDUCATION/TRAINING PROGRAM

## 2025-01-30 PROCEDURE — 99239 HOSP IP/OBS DSCHRG MGMT >30: CPT | Performed by: INTERNAL MEDICINE

## 2025-01-30 PROCEDURE — 82948 REAGENT STRIP/BLOOD GLUCOSE: CPT | Performed by: STUDENT IN AN ORGANIZED HEALTH CARE EDUCATION/TRAINING PROGRAM

## 2025-01-30 PROCEDURE — 63710000001 FERROUS SULFATE 325 (65 FE) MG TABLET: Performed by: STUDENT IN AN ORGANIZED HEALTH CARE EDUCATION/TRAINING PROGRAM

## 2025-01-30 PROCEDURE — 63710000001 HYDROCODONE-ACETAMINOPHEN 5-325 MG TABLET: Performed by: STUDENT IN AN ORGANIZED HEALTH CARE EDUCATION/TRAINING PROGRAM

## 2025-01-30 PROCEDURE — 82310 ASSAY OF CALCIUM: CPT | Performed by: STUDENT IN AN ORGANIZED HEALTH CARE EDUCATION/TRAINING PROGRAM

## 2025-01-30 PROCEDURE — 96375 TX/PRO/DX INJ NEW DRUG ADDON: CPT

## 2025-01-30 PROCEDURE — 63710000001 DIGOXIN 125 MCG TABLET: Performed by: STUDENT IN AN ORGANIZED HEALTH CARE EDUCATION/TRAINING PROGRAM

## 2025-01-30 PROCEDURE — A9270 NON-COVERED ITEM OR SERVICE: HCPCS | Performed by: INTERNAL MEDICINE

## 2025-01-30 PROCEDURE — 63710000001 CHOLESTYRAMINE 4 G PACK: Performed by: INTERNAL MEDICINE

## 2025-01-30 PROCEDURE — 36415 COLL VENOUS BLD VENIPUNCTURE: CPT | Performed by: STUDENT IN AN ORGANIZED HEALTH CARE EDUCATION/TRAINING PROGRAM

## 2025-01-30 PROCEDURE — 63710000001 BUPROPION XL 150 MG TABLET SUSTAINED-RELEASE 24 HOUR: Performed by: STUDENT IN AN ORGANIZED HEALTH CARE EDUCATION/TRAINING PROGRAM

## 2025-01-30 PROCEDURE — 25010000002 BUMETANIDE PER 0.5 MG: Performed by: INTERNAL MEDICINE

## 2025-01-30 PROCEDURE — 63710000001 INSULIN LISPRO (HUMAN) PER 5 UNITS: Performed by: STUDENT IN AN ORGANIZED HEALTH CARE EDUCATION/TRAINING PROGRAM

## 2025-01-30 PROCEDURE — 63710000001 BACLOFEN 10 MG TABLET: Performed by: STUDENT IN AN ORGANIZED HEALTH CARE EDUCATION/TRAINING PROGRAM

## 2025-01-30 PROCEDURE — 63710000001 LOPERAMIDE 2 MG CAPSULE: Performed by: STUDENT IN AN ORGANIZED HEALTH CARE EDUCATION/TRAINING PROGRAM

## 2025-01-30 RX ORDER — CHOLESTYRAMINE 4 G/9G
1 POWDER, FOR SUSPENSION ORAL 2 TIMES DAILY
Start: 2025-01-30

## 2025-01-30 RX ORDER — NICOTINE POLACRILEX 4 MG
15 LOZENGE BUCCAL
Start: 2025-01-30

## 2025-01-30 RX ORDER — OXYCODONE HYDROCHLORIDE 5 MG/1
5 TABLET ORAL ONCE
Status: COMPLETED | OUTPATIENT
Start: 2025-01-30 | End: 2025-01-30

## 2025-01-30 RX ORDER — NYSTATIN 100000 [USP'U]/G
POWDER TOPICAL EVERY 12 HOURS SCHEDULED
Start: 2025-01-30

## 2025-01-30 RX ORDER — ALUMINA, MAGNESIA, AND SIMETHICONE 2400; 2400; 240 MG/30ML; MG/30ML; MG/30ML
15 SUSPENSION ORAL EVERY 6 HOURS PRN
Start: 2025-01-30

## 2025-01-30 RX ORDER — BACLOFEN 10 MG/1
10 TABLET ORAL 3 TIMES DAILY PRN
Start: 2025-01-30

## 2025-01-30 RX ORDER — HYDROCODONE BITARTRATE AND ACETAMINOPHEN 5; 325 MG/1; MG/1
1 TABLET ORAL EVERY 6 HOURS PRN
Start: 2025-01-30

## 2025-01-30 RX ORDER — LOPERAMIDE HYDROCHLORIDE 2 MG/1
4 CAPSULE ORAL 4 TIMES DAILY PRN
Start: 2025-01-30

## 2025-01-30 RX ADMIN — FERROUS SULFATE TAB 325 MG (65 MG ELEMENTAL FE) 325 MG: 325 (65 FE) TAB at 08:22

## 2025-01-30 RX ADMIN — BACLOFEN 10 MG: 10 TABLET ORAL at 07:41

## 2025-01-30 RX ADMIN — Medication 10 ML: at 08:23

## 2025-01-30 RX ADMIN — INSULIN LISPRO 2 UNITS: 100 INJECTION, SOLUTION INTRAVENOUS; SUBCUTANEOUS at 08:22

## 2025-01-30 RX ADMIN — BENZOCAINE: 200 GEL DENTAL; ORAL; PERIODONTAL at 07:41

## 2025-01-30 RX ADMIN — OXYCODONE HYDROCHLORIDE 5 MG: 5 TABLET ORAL at 12:22

## 2025-01-30 RX ADMIN — NYSTATIN: 100000 POWDER TOPICAL at 08:23

## 2025-01-30 RX ADMIN — APIXABAN 5 MG: 5 TABLET, FILM COATED ORAL at 08:21

## 2025-01-30 RX ADMIN — DIGOXIN 125 MCG: 125 TABLET ORAL at 12:11

## 2025-01-30 RX ADMIN — HYDROCODONE BITARTRATE AND ACETAMINOPHEN 1 TABLET: 5; 325 TABLET ORAL at 10:43

## 2025-01-30 RX ADMIN — LOPERAMIDE HYDROCHLORIDE 4 MG: 2 CAPSULE ORAL at 00:04

## 2025-01-30 RX ADMIN — INSULIN LISPRO 2 UNITS: 100 INJECTION, SOLUTION INTRAVENOUS; SUBCUTANEOUS at 12:11

## 2025-01-30 RX ADMIN — BUMETANIDE 1 MG: 0.25 INJECTION INTRAMUSCULAR; INTRAVENOUS at 08:21

## 2025-01-30 RX ADMIN — CHOLESTYRAMINE 1 PACKET: 4 POWDER, FOR SUSPENSION ORAL at 08:21

## 2025-01-30 RX ADMIN — HYDROCODONE BITARTRATE AND ACETAMINOPHEN 1 TABLET: 5; 325 TABLET ORAL at 04:23

## 2025-01-30 RX ADMIN — INSULIN GLARGINE 10 UNITS: 100 INJECTION, SOLUTION SUBCUTANEOUS at 08:22

## 2025-01-30 RX ADMIN — BUPROPION HYDROCHLORIDE 150 MG: 150 TABLET, EXTENDED RELEASE ORAL at 08:21

## 2025-01-30 NOTE — PLAN OF CARE
Goal Outcome Evaluation:  Plan of Care Reviewed With: patient           Outcome Evaluation: VSS. Prn pain medication given for c/o  8/10 hip pain. Pt still have diarrhea, imoduim given. Nystation power ordered for abd folds and groin. No acuteevents to report, Call light within reach. Continue plan of care.

## 2025-01-30 NOTE — PROGRESS NOTES
"Nutrition Services    Patient Name: Jose Shaikh  YOB: 1960  MRN: [id number]  Admission date: 1/21/2025      CLINICAL NUTRITION ASSESSMENT      Reason for Assessment  Follow Up     H&P:  Past Medical History:   Diagnosis Date    Absence of toe of right foot     Acute osteomyelitis of left calcaneus  08/18/2021    Anxiety and depression     Arthritis     Cancer     Chronic pain     STATES HIS PAIN IS 10/10 AAT    Claustrophobia     Corns and callus     Diabetic ulcer of left heel associated with type 2 DM 08/18/2021    Diabetic ulcer of left heel associated with type 2 DM 07/06/2021    Diabetic ulcer of right midfoot associated with type 2 DM 08/18/2021    Difficulty walking     Essential hypertension 08/31/2021    Hammertoe     Hyperlipidemia LDL goal <100 08/31/2021    Ingrown toenail     Obesity     Paroxysmal atrial fibrillation 08/31/2021    Polyneuropathy     Pressure ulcer, stage 1     Tinea unguium     Type 2 diabetes mellitus with polyneuropathy         Current Problems:   Active Hospital Problems    Diagnosis     **Unable to care for self     Severe protein-calorie malnutrition         Nutrition/Diet History         Narrative   Appetite fair. Intake >%. 1/29 intake- ~2130 kcal, ~115 g protein (per menu orders). Not tolerating ONS, possible cause of diarrhea; will d/c. No additional needs.  Skin- scattered trauma/scabs to BUE and BLE. Buttocks with serous blistering to outer buttocks area, upper back with open area along old incisional scar, open wound with palpable bone to remaining right foot/stump site (wound RN note, 1/22).  +diarrhea, on Questran.  RD to follow per protocol. Continue diet as ordered, yogurt TID.     Anthropometrics        Current Height, Weight Height: 188 cm (74\")  Weight: 133 kg (293 lb 3.4 oz)   Current BMI Body mass index is 37.65 kg/m².   BMI Classification Obese Class II   % %, IBW 86.3 kg   Adjusted Body Weight (ABW)    Weight Hx  Wt Readings from " Last 11 Encounters:   01/22/25 1513 133 kg (293 lb 3.4 oz)   01/22/25 0036 83.5 kg (184 lb)   01/14/25 0548 (!) 184 kg (405 lb 3.2 oz)   01/13/25 0431 (!) 186 kg (410 lb 9.6 oz)   01/10/25 0340 (!) 198 kg (435 lb 10.1 oz)   01/09/25 0305 (!) 198 kg (436 lb 8.2 oz)   01/08/25 0502 (!) 197 kg (434 lb 8.4 oz)   01/07/25 0704 (!) 195 kg (430 lb 1.6 oz)   12/19/24 0202 (!) 187 kg (411 lb 13.1 oz)   12/18/24 1455 (!) 195 kg (430 lb 12.5 oz)   12/15/24 1912 (!) 199 kg (438 lb 11.5 oz)   12/15/24 1846 (!) 199 kg (439 lb 6 oz)   12/12/24 0730 (!) 175 kg (384 lb 14.8 oz)   12/06/24 2226 (!) 177 kg (389 lb 12.4 oz)   11/30/24 1436 (!) 174 kg (383 lb 9.6 oz)   11/25/24 1600 (!) 166 kg (365 lb 15.4 oz)   11/19/24 1534 (!) 167 kg (369 lb)   11/08/24 1527 (!) 168 kg (369 lb 11.4 oz)   10/26/24 0615 (!) 167 kg (368 lb 2.7 oz)   10/26/24 0609 (!) 155 kg (341 lb 11.4 oz)   10/09/24 0950 (!) 155 kg (341 lb 7.9 oz)   10/02/24 0805 (!) 150 kg (330 lb 0.5 oz)          Wt Change Observation Wt down 112# in 1 week; question accuracy. UBW of 410-435#     Estimated/Assessed Needs  Estimated Needs based on: Ideal Body Weight 86 kg       Energy Requirements 25-35 kcal/kg   EST Needs (kcal/day) 7920-4656 kcal       Protein Requirements 1.5 g/kg -1.7 g/kg   EST Daily Needs (g/day) 130-145 g       Fluid Requirements 20-25 ml/kg    Estimated Needs (mL/day) 8378-6941 mL     Labs/Medications         Pertinent Labs Reviewed. CHF, hyponatremia. DM, hyperglycemia   Results from last 7 days   Lab Units 01/30/25  0609 01/29/25  0522 01/28/25  0521 01/27/25  0508   SODIUM mmol/L  --  133* 132* 132*   POTASSIUM mmol/L  --  3.4* 3.6 3.4*   CHLORIDE mmol/L  --  98 99 99   CO2 mmol/L  --  25.4 23.8 25.6   BUN mg/dL  --  7* 10 10   CREATININE mg/dL  --  0.36* 0.39* 0.39*   CALCIUM mg/dL 7.8* 8.2* 8.1* 8.1*   GLUCOSE mg/dL  --  129* 143* 165*     Results from last 7 days   Lab Units 01/29/25  0522 01/28/25  0521 01/27/25  0508   MAGNESIUM mg/dL 1.9 1.8 1.8    PHOSPHORUS mg/dL 3.0 2.5 2.5   HEMOGLOBIN g/dL 10.1* 10.5* 10.4*   HEMATOCRIT % 33.6* 35.8* 35.1*     COVID19   Date Value Ref Range Status   01/13/2025 Not Detected Not Detected - Ref. Range Final     Lab Results   Component Value Date    HGBA1C 4.40 (L) 08/28/2024         Pertinent Medications Reviewed.     Malnutrition Severity Assessment      Patient meets criteria for : Severe Malnutrition         Nutrition Diagnosis         Nutrition Dx Problem 1 Severe malnutrition related to Inability to consume sufficient energy as evidenced by inadequate energy intake., decreased appetite., body composition changes., patient report., impaired skin integrity., and edema masking potential dry wt loss     Nutrition Intervention           Current Nutrition Orders & Evaluation of Intake       Current PO Diet Diet: Diabetic, High Protein; Consistent Carbohydrate; Fluid Consistency: Thin (IDDSI 0)   Supplement Orders Placed This Encounter      Patient is on Glucommander      Dietary Nutrition Supplements Boost Glucose Control (Glucerna Shake); chocolate           Nutrition Intervention/Prescription        Continue High protein, carb controlled diet.  Suggest MVM, Vit C- for wound healing.        Medical Nutrition Therapy/Nutrition Education          Learner     Readiness Patient  Acceptance     Method     Response Explanation  Verbalizes understanding     Monitor/Evaluation        Monitor Per protocol, PO intake, Supplement intake, Pertinent labs, Skin status     Nutrition Discharge Plan         Recommend to continue oral nutrition supplements on discharge.      Electronically signed by:  Aileen Polanco RD  01/30/25 08:35 EST

## 2025-01-30 NOTE — DISCHARGE SUMMARY
[facility]         HOSPITALIST  DISCHARGE SUMMARY    Patient Name: Jose Shaikh  : 1960  MRN: [id number]    Date of Admission: 2025  Date of Discharge:  2025  Primary Care Physician: Provider, No Known  Admitting: Medicine  Consultants: None    Final Diagnoses:  Severe right hip osteoarthritis  Debility with wheelchair dependence  Inability to care for self  History of chronic diastolic congestive heart failure  paroxysmal A-fib on Eliquis  Type 2 diabetes  Morbid obesity  Chronic venous stasis  Deconditioning  Hemorrhoids   Anasarca  C. Diff colonization       Hospital Course     Hospital Course:  Jose Shaikh is a 64 y.o. male with inability to care for himself largely due to severe hip osteoarthritis, morbid obesity, debility requiring wheelchair. He had a prolonged hospital stay recently but was only able to stay home for about 5 days before coming back to ED. He is unable to care for himself at home. He has had issues with significant lower extremity edema, chronic venous stasis, deconditioning, anasarca, diarrhea. He was been accepted to a facility in [place]. He has previously had his hip evaluated by orthopedic surgery but was too overweight to consider replacement at that time.     DISCHARGE Follow Up Recommendations for labs and diagnostics: PT/OT, dietitian, healthy weight loss with goal of potential hip repair in the future if possible      Day of Discharge     Vital Signs:  Temp:  [97.3 °F (36.3 °C)-98.2 °F (36.8 °C)] 97.9 °F (36.6 °C)  Heart Rate:  [87-96] 91  Resp:  [16-20] 16  BP: (120-126)/(57-80) 124/70  Physical Exam: no distress, nad s1s2 abd s/nd/nt      Discharge Details        Discharge Medications        New Medications        Instructions Start Date   aluminum-magnesium hydroxide-simethicone 400-400-40 MG/5ML suspension  Commonly known as: MAALOX MAX   15 mL, Oral, Every 6 Hours PRN      baclofen 10 MG tablet  Commonly known as: LIORESAL   10 mg, Oral, 3  Times Daily PRN      cholestyramine 4 g packet  Commonly known as: QUESTRAN   1 packet, Oral, 2 Times Daily      dextrose 40 % gel  Commonly known as: GLUTOSE   15 g, Oral, Every 15 Minutes PRN      HYDROcodone-acetaminophen 5-325 MG per tablet  Commonly known as: Norco   1 tablet, Oral, Every 6 Hours PRN      loperamide 2 MG capsule  Commonly known as: IMODIUM   4 mg, Oral, 4 Times Daily PRN      nystatin 556993 UNIT/GM powder  Commonly known as: MYCOSTATIN   Topical, Every 12 Hours Scheduled             Continue These Medications        Instructions Start Date   BASAGLAR KWIKPEN 100 UNIT/ML injection pen   10 Units, Subcutaneous, Daily      bumetanide 1 MG tablet  Commonly known as: BUMEX   1 mg, Oral, Daily      buPROPion  MG 24 hr tablet  Commonly known as: Wellbutrin XL   150 mg, Oral, Daily      digoxin 125 MCG tablet  Commonly known as: LANOXIN   125 mcg, Oral, Daily Digoxin      Eliquis 5 MG tablet tablet  Generic drug: apixaban   5 mg, Oral, Every 12 Hours Scheduled      ferrous sulfate 325 (65 FE) MG tablet   325 mg, Oral, Daily With Breakfast      Lidocaine 4 %   3 patches, Transdermal, Every 24 Hours Scheduled, Remove & Discard patch within 12 hours or as directed by MD Zamora FlexPen 100 UNIT/ML solution pen-injector sc pen  Generic drug: insulin aspart   Inject subq 3 times a day with meals. Use sliding scale. Max daily dose 45u -199- 3u  200-249- 5u 250-299- 8u 300-349- 10u 350-400- 12u >400- 14units & Call Provider      potassium chloride 10 MEQ CR capsule  Commonly known as: MICRO-K   20 mEq, Oral, Daily, Take with your bumex medicine      sertraline 50 MG tablet  Commonly known as: ZOLOFT   50 mg, Oral, Nightly      simvastatin 20 MG tablet  Commonly known as: ZOCOR   20 mg, Oral, Nightly             Stop These Medications      acetaminophen 650 MG 8 hr tablet  Commonly known as: TYLENOL     amoxicillin-clavulanate 875-125 MG per tablet  Commonly known as: AUGMENTIN     magnesium  oxide 400 tablet tablet  Commonly known as: MAG-OX     metoprolol succinate XL 25 MG 24 hr tablet  Commonly known as: TOPROL-XL     oxyCODONE-acetaminophen 5-325 MG per tablet  Commonly known as: PERCOCET     sennosides-docusate 8.6-50 MG per tablet  Commonly known as: PERICOLACE              Allergies   Allergen Reactions    Adhesive Tape Rash       Discharge Disposition:  Skilled Nursing Facility (DC - External)    Diet:  Hospital:  Diet Order   Procedures    Diet: Diabetic, High Protein; Consistent Carbohydrate; Fluid Consistency: Thin (IDDSI 0)       Discharge Activity:       CODE STATUS:  Code Status and Medical Interventions: CPR (Attempt to Resuscitate); Full Support   Ordered at: 01/22/25 1643     Code Status (Patient has no pulse and is not breathing):    CPR (Attempt to Resuscitate)     Medical Interventions (Patient has pulse or is breathing):    Full Support         No future appointments.        Pertinent  and/or Most Recent Results     PROCEDURES:   none    LAB RESULTS:      Lab 01/29/25  0522 01/28/25  0521 01/27/25  0508 01/26/25  0536 01/25/25  0613   WBC 4.49 4.82 4.30 3.93 4.29   HEMOGLOBIN 10.1* 10.5* 10.4* 10.2* 10.2*   HEMATOCRIT 33.6* 35.8* 35.1* 34.3* 33.8*   PLATELETS 115* 118* 116* 118* 125*   NEUTROS ABS 2.68 3.16 2.44 2.24 2.60   IMMATURE GRANS (ABS) 0.01 0.01 0.01 0.01 0.02   LYMPHS ABS 1.08 0.93 1.15 1.04 1.01   MONOS ABS 0.54 0.54 0.50 0.49 0.51   EOS ABS 0.11 0.12 0.14 0.11 0.10   MCV 71.3* 71.6* 71.5* 71.0* 70.1*         Lab 01/30/25  0609 01/29/25  0522 01/28/25  0521 01/27/25  0508 01/26/25  0536 01/25/25  0613   SODIUM  --  133* 132* 132* 132* 132*   POTASSIUM  --  3.4* 3.6 3.4* 3.5 3.6   CHLORIDE  --  98 99 99 98 98   CO2  --  25.4 23.8 25.6 26.5 25.6   ANION GAP  --  9.6 9.2 7.4 7.5 8.4   BUN  --  7* 10 10 10 10   CREATININE  --  0.36* 0.39* 0.39* 0.43* 0.44*   EGFR  --  124.2 121.3 121.3 117.7 116.9   GLUCOSE  --  129* 143* 165* 172* 224*   CALCIUM 7.8* 8.2* 8.1* 8.1* 8.0* 8.0*    MAGNESIUM  --  1.9 1.8 1.8 1.8 1.8   PHOSPHORUS  --  3.0 2.5 2.5 2.5 2.1*                         Brief Urine Lab Results  (Last result in the past 365 days)        Color   Clarity   Blood   Leuk Est   Nitrite   Protein   CREAT   Urine HCG        01/21/25 1703 Dark Yellow   Clear   Negative   Small (1+)   Negative   Negative                 Microbiology Results (last 10 days)       Procedure Component Value - Date/Time    Clostridioides difficile Toxin - Stool, Per Rectum [[id number]]  (Abnormal) Collected: 01/22/25 1517    Lab Status: Final result Specimen: Stool from Per Rectum Updated: 01/22/25 1621    Narrative:      The following orders were created for panel order Clostridioides difficile Toxin - Stool, Per Rectum.  Procedure                               Abnormality         Status                     ---------                               -----------         ------                     Clostridioides difficile...[[id number]]  Abnormal            Final result                 Please view results for these tests on the individual orders.    Clostridioides difficile Toxin, PCR - Stool, Per Rectum [[id number]]  (Abnormal) Collected: 01/22/25 1517    Lab Status: Final result Specimen: Stool from Per Rectum Updated: 01/22/25 1621     Toxigenic C. difficile by PCR Positive     027 Toxin Presumptive Negative    Narrative:      DNA from a toxigenic strain of C.difficile has been detected. Antigen testing for the presence of free C.difficile toxin is currently in progress, to help determine the clinical significance of this PCR result.     Clostridioides difficile toxin Ag, Reflex - Stool, Per Rectum [[id number]]  (Normal) Collected: 01/22/25 1517    Lab Status: Final result Specimen: Stool from Per Rectum Updated: 01/22/25 1645     C.diff Toxin Ag Negative    Narrative:      DNA from a toxigenic strain of C.difficile was detected, although the free toxin itself was not detected. These findings are consistent with  C.difficile colonization and may not reflect actual C.difficile infection. Clinical correlation needed.    Urine Culture - Urine, Urine, Clean Catch [[id number]] Collected: 01/21/25 1703    Lab Status: Final result Specimen: Urine, Clean Catch Updated: 01/22/25 1141     Urine Culture 25,000 CFU/mL Normal Urogenital Berenice    Narrative:      Colonization of the urinary tract without infection is common. Treatment is discouraged unless the patient is symptomatic, pregnant, or undergoing an invasive urologic procedure.            No radiology results for the last 7 days     Results for orders placed during the hospital encounter of 12/18/24    Duplex Venous Upper Extremity - Right CV-READ    Interpretation Summary    Normal right upper extremity venous duplex scan.      Results for orders placed during the hospital encounter of 12/18/24    Duplex Venous Upper Extremity - Right CV-READ    Interpretation Summary    Normal right upper extremity venous duplex scan.      Results for orders placed during the hospital encounter of 10/14/24    Adult Transthoracic Echo Complete W/ Cont if Necessary Per Protocol    Interpretation Summary    Left ventricular systolic function is normal. Left ventricular ejection fraction appears to be 61 - 65%.    Left ventricular wall thickness is consistent with borderline concentric hypertrophy.    Left ventricular diastolic function is consistent with (grade I) impaired relaxation.    There are no hemodynamically significant valvular abnormalities.    This is a technically difficult study.  Contrast used for better endocardial definition.      Labs Pending at Discharge:        Time spent on Discharge including face to face service:  ~45 minutes    Electronically signed by Jose Maya MD, 01/30/25, 12:14 PM EST.

## 2025-01-31 NOTE — SIGNIFICANT NOTE
Wound Eval / Progress Noted    KEO Dominguez     Patient Name: Jose Shaikh  : 1960  MRN: [id number]  Today's Date: 2025                 Admit Date: 2025    Visit Dx:    ICD-10-CM ICD-9-CM   1. Impaired mobility and ADLs  Z74.09 V49.89    Z78.9    2. Difficulty in walking  R26.2 719.7   3. Multiple wounds of skin  T14.8XXA 959.8   4. Obesity, unspecified class, unspecified obesity type, unspecified whether serious comorbidity present  E66.9 278.00   5. Difficulty walking  R26.2 719.7   6. Osteoarthritis of right hip, unspecified osteoarthritis type  M16.11 715.95         Unable to care for self    Severe protein-calorie malnutrition        Past Medical History:   Diagnosis Date    Absence of toe of right foot     Acute osteomyelitis of left calcaneus  2021    Anxiety and depression     Arthritis     Cancer     Chronic pain     STATES HIS PAIN IS 10/10 AAT    Claustrophobia     Corns and callus     Diabetic ulcer of left heel associated with type 2 DM 2021    Diabetic ulcer of left heel associated with type 2 DM 2021    Diabetic ulcer of right midfoot associated with type 2 DM 2021    Difficulty walking     Essential hypertension 2021    Hammertoe     Hyperlipidemia LDL goal <100 2021    Ingrown toenail     Obesity     Paroxysmal atrial fibrillation 2021    Polyneuropathy     Pressure ulcer, stage 1     Tinea unguium     Type 2 diabetes mellitus with polyneuropathy         Past Surgical History:   Procedure Laterality Date    CYST REMOVAL      center of back; benign    EYE SURGERY      INCISION AND DRAINAGE ABSCESS      back    INCISION AND DRAINAGE LEG Right 12/10/2021    Procedure: INCISION AND DRAINAGE LOWER EXTREMITY;  Surgeon: Ash Leyva DPM;  Location: [facility] MAIN OR;  Service: Podiatry;  Laterality: Right;    OTHER SURGICAL HISTORY      Surgical clips left foot    TOE SURGERY Right     Removal of 5th toe    TRANS METATARSAL AMPUTATION  Right 12/02/2021    Procedure: AMPUTATION TRANS METATARSAL;  Surgeon: Ash Leyva DPM;  Location: [facility] MAIN OR;  Service: Podiatry;  Laterality: Right;    VASCULAR SURGERY      WRIST SURGERY Left     repair of injury         Physical Assessment:  Wound 12/19/24 0259 Right anterior foot (Active)   Wound Image   01/30/25 1234   Dressing Appearance dry;intact 01/30/25 1234   Closure None 01/30/25 1234   Base moist;red 01/30/25 1234   Red (%), Wound Tissue Color 100 01/30/25 1234   Periwound dry 01/30/25 1234   Periwound Temperature warm 01/30/25 1234   Periwound Skin Turgor soft 01/30/25 1234   Edges open 01/30/25 1234   Wound Length (cm) 1.4 cm 01/30/25 1234   Wound Width (cm) 1.2 cm 01/30/25 1234   Wound Depth (cm) 0.2 cm 01/30/25 1234   Wound Surface Area (cm^2) 1.68 cm^2 01/30/25 1234   Wound Volume (cm^3) 0.336 cm^3 01/30/25 1234   Drainage Characteristics/Odor serosanguineous 01/30/25 1234   Drainage Amount scant 01/30/25 1234   Care, Wound cleansed with;sterile normal saline 01/30/25 1234   Dressing Care dressing removed;dressing applied;silver impregnated;hydrofiber;silicone border foam 01/30/25 1234   Periwound Care absorptive dressing applied 01/30/25 1234       Wound Left proximal arm (Active)   Wound Image   01/30/25 1234   Dressing Appearance open to air 01/30/25 1234   Closure None 01/30/25 1234   Base dry;scab 01/30/25 1234   Periwound dry;ecchymotic 01/30/25 1234   Periwound Temperature warm 01/30/25 1234   Periwound Skin Turgor soft 01/30/25 1234   Edges rolled/closed 01/30/25 1234   Drainage Amount none 01/30/25 1234   Care, Wound cleansed with;sterile normal saline 01/30/25 1234   Dressing Care open to air 01/30/25 1234   Periwound Care dry periwound area maintained 01/30/25 1234       Wound 01/30/25 1234 Right lower arm unspecified (Active)   Wound Image   01/30/25 1234   Dressing Appearance open to air 01/30/25 1234   Closure None 01/30/25 1234   Base dry;scab 01/30/25 1234    Periwound dry;ecchymotic;redness 01/30/25 1234   Periwound Temperature warm 01/30/25 1234   Periwound Skin Turgor soft 01/30/25 1234   Edges rolled/closed 01/30/25 1234   Drainage Amount none 01/30/25 1234   Care, Wound cleansed with;sterile normal saline 01/30/25 1234   Dressing Care open to air 01/30/25 1234   Periwound Care dry periwound area maintained 01/30/25 1234       Wound 01/30/25 1234 Right anterior knee unspecified (Active)   Wound Image   01/30/25 1234   Dressing Appearance dry;intact 01/30/25 1234   Closure None 01/30/25 1234   Base moist;red 01/30/25 1234   Red (%), Wound Tissue Color 100 01/30/25 1234   Periwound dry;pink 01/30/25 1234   Periwound Temperature warm 01/30/25 1234   Periwound Skin Turgor soft 01/30/25 1234   Edges open 01/30/25 1234   Drainage Characteristics/Odor serosanguineous 01/30/25 1234   Drainage Amount scant 01/30/25 1234   Care, Wound cleansed with;sterile normal saline 01/30/25 1234   Dressing Care dressing removed;open to air 01/30/25 1234   Periwound Care dry periwound area maintained 01/30/25 1234    Right Aspect of Back    Wound Check / Follow-up:  Patient seen today for wound follow-up. Patient is awake, alert, and oriented. Primary RN reports patient will be discharging to [facility] around 1300. Primary RN present at bedside for a portion of the assessment.Patient was noted to have been incontinent of stool. Incontinence care and linen change performed with assistance per primary RN.    Traumatic injury to right anterior foot. Wound base presents with moist red tissue. Periwound tissue is dry with pink coloration. Cleansed with normal saline and gauze, blotted dry. Recommending to continue daily dressing changes with silver impregnated hydrofiber and silicone border dressing securement.    Bilateral arms present with scattered areas of crusted brown/tan tissue. Periwound tissue is dry with redness/ecchymosis to right arm and ecchymosis to left  arm. Cleansed with normal saline and gauze, blotted dry. Recommending to continue current skin care orders.    Right aspect of back presents with an area of thin, dry tan tissue. A section of the dry tissue was dislodged while cleansing and no wound base was noted beneath. Cleansed with normal saline and gauze, blotted dry. Recommending quality skin care and hygiene with application of moisturizer twice a day.    Traumatic injury to right knee. There is a 0.1cm x 0.1cm area of moist red tissue. Periwound tissue is dry with pink scar tissue. Patient declined to have a dressing replaced at this time. Recommending quality skin care and hygiene with application of moisturizer twice a day.    Blanchable redness noted to posterior right ankle. Periwound tissue is dry with no discoloration. Recommending quality skin care and hygiene with application of blue top moisture barrier four times a day. Recommending pressure offloading at all times.    MASD with a fungal presentation noted to groin and bilateral upper thighs. Tissue is intact with redness and a fungal presentation. No discoloration noted to surrounding tissue. Cleansed with foam soap and water, blotted dry. Recommending to continue quality skin care and hygiene with a light dusting of nystatin powder being applied twice a day.    Recommending to continue quality skin care and hygiene to gluteal aspects.    Impression: Traumatic injury to right anterior foot and right knee, scattered crusted brown/tan tissue to bilateral arms, blanchable redness to right posterior ankle, MASD with a fungal presentation to groin and bilateral upper thighs, dry tan tissue to right aspect of back    Short term goals:  Regain skin integrity, skin protection, moisture prevention, pressure reduction, quality skin care and hygiene, topical treatment, daily dressing change    Greer North RN    1/30/2025    19:08 EST
